# Patient Record
Sex: FEMALE | Race: WHITE | URBAN - METROPOLITAN AREA
[De-identification: names, ages, dates, MRNs, and addresses within clinical notes are randomized per-mention and may not be internally consistent; named-entity substitution may affect disease eponyms.]

---

## 2021-03-18 ENCOUNTER — INPATIENT (INPATIENT)
Facility: HOSPITAL | Age: 56
LOS: 14 days | Discharge: NURSING, REHAB OR LONG-TERM CARE | End: 2021-04-02
Attending: HOSPITALIST | Admitting: HOSPITALIST
Payer: COMMERCIAL

## 2021-03-18 VITALS
DIASTOLIC BLOOD PRESSURE: 50 MMHG | TEMPERATURE: 99 F | SYSTOLIC BLOOD PRESSURE: 100 MMHG | HEART RATE: 101 BPM | WEIGHT: 104.94 LBS | RESPIRATION RATE: 20 BRPM | HEIGHT: 58 IN | OXYGEN SATURATION: 99 %

## 2021-03-18 PROCEDURE — 99285 EMERGENCY DEPT VISIT HI MDM: CPT

## 2021-03-18 PROCEDURE — 99053 MED SERV 10PM-8AM 24 HR FAC: CPT

## 2021-03-18 NOTE — ED ADULT TRIAGE NOTE - CHIEF COMPLAINT QUOTE
BENNETT with complaints of hypotension, sent form nursing home. Pt non-verbal, currently on IV ABT BENNETT with complaints of hypotension, sent form nursing home. Pt non-verbal, currently on IV ABT for decubiti

## 2021-03-19 DIAGNOSIS — Z98.890 OTHER SPECIFIED POSTPROCEDURAL STATES: Chronic | ICD-10-CM

## 2021-03-19 LAB
ALBUMIN SERPL ELPH-MCNC: 2.1 G/DL — LOW (ref 3.5–5.2)
ALBUMIN SERPL ELPH-MCNC: 2.2 G/DL — LOW (ref 3.5–5.2)
ALP SERPL-CCNC: 79 U/L — SIGNIFICANT CHANGE UP (ref 30–115)
ALP SERPL-CCNC: 83 U/L — SIGNIFICANT CHANGE UP (ref 30–115)
ALT FLD-CCNC: 80 U/L — HIGH (ref 0–41)
ALT FLD-CCNC: 96 U/L — HIGH (ref 0–41)
ANION GAP SERPL CALC-SCNC: 7 MMOL/L — SIGNIFICANT CHANGE UP (ref 7–14)
ANION GAP SERPL CALC-SCNC: 9 MMOL/L — SIGNIFICANT CHANGE UP (ref 7–14)
AST SERPL-CCNC: 44 U/L — HIGH (ref 0–41)
AST SERPL-CCNC: 53 U/L — HIGH (ref 0–41)
BASOPHILS # BLD AUTO: 0.05 K/UL — SIGNIFICANT CHANGE UP (ref 0–0.2)
BASOPHILS # BLD AUTO: 0.05 K/UL — SIGNIFICANT CHANGE UP (ref 0–0.2)
BASOPHILS NFR BLD AUTO: 0.6 % — SIGNIFICANT CHANGE UP (ref 0–1)
BASOPHILS NFR BLD AUTO: 0.7 % — SIGNIFICANT CHANGE UP (ref 0–1)
BILIRUB SERPL-MCNC: <0.2 MG/DL — SIGNIFICANT CHANGE UP (ref 0.2–1.2)
BILIRUB SERPL-MCNC: <0.2 MG/DL — SIGNIFICANT CHANGE UP (ref 0.2–1.2)
BLD GP AB SCN SERPL QL: SIGNIFICANT CHANGE UP
BLD GP AB SCN SERPL QL: SIGNIFICANT CHANGE UP
BUN SERPL-MCNC: 15 MG/DL — SIGNIFICANT CHANGE UP (ref 10–20)
BUN SERPL-MCNC: 22 MG/DL — HIGH (ref 10–20)
CALCIUM SERPL-MCNC: 7.9 MG/DL — LOW (ref 8.5–10.1)
CALCIUM SERPL-MCNC: 8.2 MG/DL — LOW (ref 8.5–10.1)
CHLORIDE SERPL-SCNC: 101 MMOL/L — SIGNIFICANT CHANGE UP (ref 98–110)
CHLORIDE SERPL-SCNC: 102 MMOL/L — SIGNIFICANT CHANGE UP (ref 98–110)
CO2 SERPL-SCNC: 30 MMOL/L — SIGNIFICANT CHANGE UP (ref 17–32)
CO2 SERPL-SCNC: 32 MMOL/L — SIGNIFICANT CHANGE UP (ref 17–32)
CREAT SERPL-MCNC: 0.6 MG/DL — LOW (ref 0.7–1.5)
CREAT SERPL-MCNC: 0.7 MG/DL — SIGNIFICANT CHANGE UP (ref 0.7–1.5)
EOSINOPHIL # BLD AUTO: 0.4 K/UL — SIGNIFICANT CHANGE UP (ref 0–0.7)
EOSINOPHIL # BLD AUTO: 0.42 K/UL — SIGNIFICANT CHANGE UP (ref 0–0.7)
EOSINOPHIL NFR BLD AUTO: 4.8 % — SIGNIFICANT CHANGE UP (ref 0–8)
EOSINOPHIL NFR BLD AUTO: 5.8 % — SIGNIFICANT CHANGE UP (ref 0–8)
GLUCOSE SERPL-MCNC: 109 MG/DL — HIGH (ref 70–99)
GLUCOSE SERPL-MCNC: 92 MG/DL — SIGNIFICANT CHANGE UP (ref 70–99)
HCT VFR BLD CALC: 31 % — LOW (ref 37–47)
HCT VFR BLD CALC: 32.6 % — LOW (ref 37–47)
HGB BLD-MCNC: 10.2 G/DL — LOW (ref 12–16)
HGB BLD-MCNC: 9.7 G/DL — LOW (ref 12–16)
IMM GRANULOCYTES NFR BLD AUTO: 0.4 % — HIGH (ref 0.1–0.3)
IMM GRANULOCYTES NFR BLD AUTO: 0.5 % — HIGH (ref 0.1–0.3)
LACTATE SERPL-SCNC: 1.5 MMOL/L — SIGNIFICANT CHANGE UP (ref 0.7–2)
LYMPHOCYTES # BLD AUTO: 1.3 K/UL — SIGNIFICANT CHANGE UP (ref 1.2–3.4)
LYMPHOCYTES # BLD AUTO: 1.43 K/UL — SIGNIFICANT CHANGE UP (ref 1.2–3.4)
LYMPHOCYTES # BLD AUTO: 17.1 % — LOW (ref 20.5–51.1)
LYMPHOCYTES # BLD AUTO: 17.9 % — LOW (ref 20.5–51.1)
MAGNESIUM SERPL-MCNC: 2.2 MG/DL — SIGNIFICANT CHANGE UP (ref 1.8–2.4)
MCHC RBC-ENTMCNC: 27.1 PG — SIGNIFICANT CHANGE UP (ref 27–31)
MCHC RBC-ENTMCNC: 27.3 PG — SIGNIFICANT CHANGE UP (ref 27–31)
MCHC RBC-ENTMCNC: 31.3 G/DL — LOW (ref 32–37)
MCHC RBC-ENTMCNC: 31.3 G/DL — LOW (ref 32–37)
MCV RBC AUTO: 86.6 FL — SIGNIFICANT CHANGE UP (ref 81–99)
MCV RBC AUTO: 87.4 FL — SIGNIFICANT CHANGE UP (ref 81–99)
MONOCYTES # BLD AUTO: 0.43 K/UL — SIGNIFICANT CHANGE UP (ref 0.1–0.6)
MONOCYTES # BLD AUTO: 0.49 K/UL — SIGNIFICANT CHANGE UP (ref 0.1–0.6)
MONOCYTES NFR BLD AUTO: 5.8 % — SIGNIFICANT CHANGE UP (ref 1.7–9.3)
MONOCYTES NFR BLD AUTO: 5.9 % — SIGNIFICANT CHANGE UP (ref 1.7–9.3)
NEUTROPHILS # BLD AUTO: 5.04 K/UL — SIGNIFICANT CHANGE UP (ref 1.4–6.5)
NEUTROPHILS # BLD AUTO: 5.97 K/UL — SIGNIFICANT CHANGE UP (ref 1.4–6.5)
NEUTROPHILS NFR BLD AUTO: 69.3 % — SIGNIFICANT CHANGE UP (ref 42.2–75.2)
NEUTROPHILS NFR BLD AUTO: 71.2 % — SIGNIFICANT CHANGE UP (ref 42.2–75.2)
NRBC # BLD: 0 /100 WBCS — SIGNIFICANT CHANGE UP (ref 0–0)
NRBC # BLD: 0 /100 WBCS — SIGNIFICANT CHANGE UP (ref 0–0)
PLATELET # BLD AUTO: 452 K/UL — HIGH (ref 130–400)
PLATELET # BLD AUTO: 464 K/UL — HIGH (ref 130–400)
POTASSIUM SERPL-MCNC: 4.4 MMOL/L — SIGNIFICANT CHANGE UP (ref 3.5–5)
POTASSIUM SERPL-MCNC: 4.6 MMOL/L — SIGNIFICANT CHANGE UP (ref 3.5–5)
POTASSIUM SERPL-SCNC: 4.4 MMOL/L — SIGNIFICANT CHANGE UP (ref 3.5–5)
POTASSIUM SERPL-SCNC: 4.6 MMOL/L — SIGNIFICANT CHANGE UP (ref 3.5–5)
PROT SERPL-MCNC: 5.1 G/DL — LOW (ref 6–8)
PROT SERPL-MCNC: 5.1 G/DL — LOW (ref 6–8)
RBC # BLD: 3.58 M/UL — LOW (ref 4.2–5.4)
RBC # BLD: 3.73 M/UL — LOW (ref 4.2–5.4)
RBC # FLD: 15 % — HIGH (ref 11.5–14.5)
RBC # FLD: 15.1 % — HIGH (ref 11.5–14.5)
SARS-COV-2 RNA SPEC QL NAA+PROBE: SIGNIFICANT CHANGE UP
SODIUM SERPL-SCNC: 140 MMOL/L — SIGNIFICANT CHANGE UP (ref 135–146)
SODIUM SERPL-SCNC: 141 MMOL/L — SIGNIFICANT CHANGE UP (ref 135–146)
WBC # BLD: 7.27 K/UL — SIGNIFICANT CHANGE UP (ref 4.8–10.8)
WBC # BLD: 8.38 K/UL — SIGNIFICANT CHANGE UP (ref 4.8–10.8)
WBC # FLD AUTO: 7.27 K/UL — SIGNIFICANT CHANGE UP (ref 4.8–10.8)
WBC # FLD AUTO: 8.38 K/UL — SIGNIFICANT CHANGE UP (ref 4.8–10.8)

## 2021-03-19 PROCEDURE — 76705 ECHO EXAM OF ABDOMEN: CPT | Mod: 26

## 2021-03-19 PROCEDURE — 93010 ELECTROCARDIOGRAM REPORT: CPT

## 2021-03-19 PROCEDURE — 99233 SBSQ HOSP IP/OBS HIGH 50: CPT

## 2021-03-19 PROCEDURE — 99252 IP/OBS CONSLTJ NEW/EST SF 35: CPT

## 2021-03-19 PROCEDURE — 71045 X-RAY EXAM CHEST 1 VIEW: CPT | Mod: 26

## 2021-03-19 PROCEDURE — 73620 X-RAY EXAM OF FOOT: CPT | Mod: 26,RT

## 2021-03-19 RX ORDER — COLLAGENASE CLOSTRIDIUM HIST. 250 UNIT/G
1 OINTMENT (GRAM) TOPICAL
Refills: 0 | Status: DISCONTINUED | OUTPATIENT
Start: 2021-03-19 | End: 2021-03-26

## 2021-03-19 RX ORDER — CHOLECALCIFEROL (VITAMIN D3) 125 MCG
2000 CAPSULE ORAL DAILY
Refills: 0 | Status: DISCONTINUED | OUTPATIENT
Start: 2021-03-19 | End: 2021-03-26

## 2021-03-19 RX ORDER — SODIUM HYPOCHLORITE 0.125 %
1 SOLUTION, NON-ORAL MISCELLANEOUS
Refills: 0 | Status: DISCONTINUED | OUTPATIENT
Start: 2021-03-19 | End: 2021-03-26

## 2021-03-19 RX ORDER — ACETAMINOPHEN 500 MG
975 TABLET ORAL ONCE
Refills: 0 | Status: COMPLETED | OUTPATIENT
Start: 2021-03-19 | End: 2021-03-19

## 2021-03-19 RX ORDER — CHLORHEXIDINE GLUCONATE 213 G/1000ML
1 SOLUTION TOPICAL
Refills: 0 | Status: DISCONTINUED | OUTPATIENT
Start: 2021-03-19 | End: 2021-03-26

## 2021-03-19 RX ORDER — SODIUM CHLORIDE 9 MG/ML
1500 INJECTION, SOLUTION INTRAVENOUS ONCE
Refills: 0 | Status: COMPLETED | OUTPATIENT
Start: 2021-03-19 | End: 2021-03-19

## 2021-03-19 RX ORDER — ENOXAPARIN SODIUM 100 MG/ML
40 INJECTION SUBCUTANEOUS DAILY
Refills: 0 | Status: DISCONTINUED | OUTPATIENT
Start: 2021-03-19 | End: 2021-03-26

## 2021-03-19 RX ORDER — METRONIDAZOLE 500 MG
TABLET ORAL
Refills: 0 | Status: DISCONTINUED | OUTPATIENT
Start: 2021-03-19 | End: 2021-03-20

## 2021-03-19 RX ORDER — MEROPENEM 1 G/30ML
500 INJECTION INTRAVENOUS ONCE
Refills: 0 | Status: COMPLETED | OUTPATIENT
Start: 2021-03-19 | End: 2021-03-19

## 2021-03-19 RX ORDER — LANOLIN ALCOHOL/MO/W.PET/CERES
5 CREAM (GRAM) TOPICAL AT BEDTIME
Refills: 0 | Status: DISCONTINUED | OUTPATIENT
Start: 2021-03-19 | End: 2021-03-26

## 2021-03-19 RX ORDER — METRONIDAZOLE 500 MG
500 TABLET ORAL ONCE
Refills: 0 | Status: COMPLETED | OUTPATIENT
Start: 2021-03-19 | End: 2021-03-19

## 2021-03-19 RX ORDER — RALOXIFENE HYDROCHLORIDE 60 MG/1
60 TABLET, COATED ORAL DAILY
Refills: 0 | Status: DISCONTINUED | OUTPATIENT
Start: 2021-03-19 | End: 2021-03-26

## 2021-03-19 RX ORDER — GABAPENTIN 400 MG/1
600 CAPSULE ORAL DAILY
Refills: 0 | Status: DISCONTINUED | OUTPATIENT
Start: 2021-03-19 | End: 2021-03-26

## 2021-03-19 RX ORDER — VANCOMYCIN HCL 1 G
1000 VIAL (EA) INTRAVENOUS EVERY 12 HOURS
Refills: 0 | Status: DISCONTINUED | OUTPATIENT
Start: 2021-03-19 | End: 2021-03-20

## 2021-03-19 RX ORDER — METRONIDAZOLE 500 MG
500 TABLET ORAL EVERY 8 HOURS
Refills: 0 | Status: DISCONTINUED | OUTPATIENT
Start: 2021-03-19 | End: 2021-03-20

## 2021-03-19 RX ORDER — SODIUM CHLORIDE 9 MG/ML
1000 INJECTION, SOLUTION INTRAVENOUS ONCE
Refills: 0 | Status: COMPLETED | OUTPATIENT
Start: 2021-03-19 | End: 2021-03-19

## 2021-03-19 RX ORDER — MEROPENEM 1 G/30ML
1000 INJECTION INTRAVENOUS EVERY 24 HOURS
Refills: 0 | Status: COMPLETED | OUTPATIENT
Start: 2021-03-19 | End: 2021-03-25

## 2021-03-19 RX ADMIN — MEROPENEM 100 MILLIGRAM(S): 1 INJECTION INTRAVENOUS at 02:29

## 2021-03-19 RX ADMIN — Medication 250 MILLIGRAM(S): at 17:05

## 2021-03-19 RX ADMIN — Medication 100 MILLIGRAM(S): at 12:28

## 2021-03-19 RX ADMIN — RALOXIFENE HYDROCHLORIDE 60 MILLIGRAM(S): 60 TABLET, COATED ORAL at 12:30

## 2021-03-19 RX ADMIN — ENOXAPARIN SODIUM 40 MILLIGRAM(S): 100 INJECTION SUBCUTANEOUS at 12:29

## 2021-03-19 RX ADMIN — SODIUM CHLORIDE 1500 MILLILITER(S): 9 INJECTION, SOLUTION INTRAVENOUS at 02:26

## 2021-03-19 RX ADMIN — Medication 5 MILLIGRAM(S): at 23:55

## 2021-03-19 RX ADMIN — Medication 2000 UNIT(S): at 12:28

## 2021-03-19 RX ADMIN — MEROPENEM 100 MILLIGRAM(S): 1 INJECTION INTRAVENOUS at 16:01

## 2021-03-19 RX ADMIN — Medication 975 MILLIGRAM(S): at 02:31

## 2021-03-19 RX ADMIN — SODIUM CHLORIDE 3000 MILLILITER(S): 9 INJECTION, SOLUTION INTRAVENOUS at 15:19

## 2021-03-19 RX ADMIN — Medication 100 MILLIGRAM(S): at 23:55

## 2021-03-19 RX ADMIN — GABAPENTIN 600 MILLIGRAM(S): 400 CAPSULE ORAL at 12:29

## 2021-03-19 NOTE — CONSULT NOTE ADULT - ASSESSMENT
54 yo female hx of Down syndrome, osteoporosis, neuropathy, mild anemia, and recent decubitus ulcer to right hip s/p debridement on 3/2 at NH sent from [facility] for hypotension (66/44) and fever (unknown temperature). As per NH paper, patient was taking Vanco and danna via PICC supposed to finish Vanc and Danna on 3/22.   BURN consult requested for evaluation lof R hip and left foot wounds.       1. Right hip full thickness pressure wound  - please continue dressing twice daily: was wound with soap and water, apply santyl and hydrogel, pack with Dakins moist gauze, then cover with ABD and tape  - IV abx as per ID recommendations  - pain management  - off loading/position change  - may need debridement  - will fallow    2. Left heel partial thickness wound:  - not infected, no debridement needed  - continue wound care twice daily: wash wound with soap and water, apply xeroform, and wrap with kelrix     54 yo female hx of Down syndrome, osteoporosis, neuropathy, mild anemia, and recent decubitus ulcer to right hip s/p debridement on 3/2 at NH sent from [facility] for hypotension (66/44) and fever (unknown temperature). As per NH paper, patient was taking Vanco and danna via PICC supposed to finish Vanc and Danna on 3/22.   BURN consult requested for evaluation lof R hip and left foot wounds.       Rec:   1. Right hip full thickness pressure wound- Stage 3   - please continue dressing twice daily: was wound with soap and water, apply santyl and hydrogel, pack with Dakins moist gauze, then cover with ABD and tape  - IV abx as per ID recommendations  - pain management  - off loading/position change  - will discuss  debridement  - will fallow    2. Left heel partial thickness wound- Stage 2  - not infected, no debridement needed  - continue wound care twice daily: wash wound with soap and water, apply xeroform, and wrap with kelrix

## 2021-03-19 NOTE — SWALLOW BEDSIDE ASSESSMENT ADULT - SLP PERTINENT HISTORY OF CURRENT PROBLEM
4 yo female hx of Down syndrome, osteoporosis, neuropathy, mild anemia, and recent decubitus ulcer to right hip s/p debridement on 3/2 at NH sent from [facility] for hypotension (66/44) and fever. Patient baseline non-verbal and unable to give history. CXR 3/19/21 (right opacity, left retrocardiac opacity). 54 y/o F presented from [facility] for hypotension and fevers. CXR 3/19/21 (right opacity, left retrocardiac opacity). Down syndrome, osteoporosis, neuropathy, mild anemia, and recent decubitus ulcer to right hip s/p debridement on 3/2 at NH.

## 2021-03-19 NOTE — ED ADULT NURSE NOTE - NSIMPLEMENTINTERV_GEN_ALL_ED
Implemented All Universal Safety Interventions:  [place] to call system. Call bell, personal items and telephone within reach. Instruct patient to call for assistance. Room bathroom lighting operational. Non-slip footwear when patient is off stretcher. Physically safe environment: no spills, clutter or unnecessary equipment. Stretcher in lowest position, wheels locked, appropriate side rails in place. Implemented All Fall Risk Interventions:  [place] to call system. Call bell, personal items and telephone within reach. Instruct patient to call for assistance. Room bathroom lighting operational. Non-slip footwear when patient is off stretcher. Physically safe environment: no spills, clutter or unnecessary equipment. Stretcher in lowest position, wheels locked, appropriate side rails in place. Provide visual cue, wrist band, yellow gown, etc. Monitor gait and stability. Monitor for mental status changes and reorient to person, place, and time. Review medications for side effects contributing to fall risk. Reinforce activity limits and safety measures with patient and family.

## 2021-03-19 NOTE — ED PROVIDER NOTE - OBJECTIVE STATEMENT
54 yo female hx of Down syndrome/ recent decubitus ulcer to right hip s/p debridement on 3/2 from an outside hospital sent from NH for hypotension (66/44) and fever. as per NH paper, patient was taking Vanco for decubitus ulcer and recently developed more wounds to right foot and left foot/heel. patient baseline non-verbal and unable to give history.

## 2021-03-19 NOTE — H&P ADULT - NSHPPOADEEPVENOUSTHROMB_GEN_A_CORE
Called patient, he states the hyddroxyzine 25mg makes him extremely tired to were he can not function, she states he just wants to sleep and he is completely zoned out.  He is asking for a different dose or a different medication.  I let him know I will talk to Dr. Nguyen and call him back.  Per Dr. Nguyen; patient can try alprazolam 0.25mg TID PRN.   Notified patient and called in prescription to [facility] in [place].      no

## 2021-03-19 NOTE — SWALLOW BEDSIDE ASSESSMENT ADULT - COMMENTS
Pt received awake on NC LPM. +Nonverbal, +clear voice, +baseline cough of mild congestion and dry quality Desaturation to 79% SPO2. Baseline cough present throughout trials. Spoke w/ RN at [facility] ([place]), who reported that patient's baseline diet is puree w/ nectar thick liquids.

## 2021-03-19 NOTE — SWALLOW BEDSIDE ASSESSMENT ADULT - NS SPL SWALLOW CLINIC TRIAL FT
+toleration without overt s/s of penetration/aspiration. Spontaneous swallow post oral intake. Baseline cough present throughout trials. +toleration without overt s/s of penetration/aspiration. Spontaneous swallow post oral intake. Baseline cough present throughout trials, unchanged.

## 2021-03-19 NOTE — ED ADULT NURSE NOTE - CHIEF COMPLAINT QUOTE
BENNETT with complaints of hypotension, sent form nursing home. Pt non-verbal, currently on IV ABT for decubiti

## 2021-03-19 NOTE — CONSULT NOTE ADULT - ASSESSMENT
ASSESSMENT  56 yo female hx of Down syndrome, osteoporosis, neuropathy, mild anemia, and recent decubitus ulcer to right hip s/p debridement on 3/2 at NH sent from [facility] for hypotension (66/44) and fever (unknown temperature)    IMPRESSION  #Fevers/Hypotension  - s/p right hip debridement 3/2  - planned for Vancomycin + Meropenem via PICC line    #Decubitus ulcer  #Down Syndrome  #Abx allergy: NKDA    Creatinine, Serum: 0.7 mg/dL (03.19.21 @ 02:30)  Weight (kg): 47.6 (18 Mar 2021 23:50)    RECOMMENDATIONS  - continue vancomycin 1g q 12 hours -- please check trough prior to next dose, goal 10-15  - continue meropenem 1g q 8 hours  - follow-up fungitell  - will follow burn for debridement  - please check Nursing home for culture history from debridement 3/2  - follow-up blood cultures  - if with clinical decompensation, can add caspofungin 70 mg x 1    Please call or message on Microsoft Teams if with any questions.  Spectra [phone]

## 2021-03-19 NOTE — H&P ADULT - NSHPLABSRESULTS_GEN_ALL_CORE
LABS/RADIOLOGY RESULTS:                          10.2   7.27  )-----------( 452      ( 19 Mar 2021 02:30 )             32.6   03-19    141  |  102  |  22<H>  ----------------------------<  109<H>  4.6   |  30  |  0.7    Ca    7.9<L>      19 Mar 2021 02:30    TPro  5.1<L>  /  Alb  2.2<L>  /  TBili  <0.2  /  DBili  x   /  AST  53<H>  /  ALT  96<H>  /  AlkPhos  83  03-19  Blood Cultures

## 2021-03-19 NOTE — CONSULT NOTE ADULT - SUBJECTIVE AND OBJECTIVE BOX
TEA ECHAVARRIA  55y, Female  Allergy: No Known Allergies      CHIEF COMPLAINT: Hypotension and fever (19 Mar 2021 20:29)      LOS      HPI:  54 yo female hx of Down syndrome, osteoporosis, neuropathy, mild anemia, and recent decubitus ulcer to right hip s/p debridement on 3/2 at NH sent from [facility] for hypotension (66/44) and fever (unknown temperature). As per NH paper, patient was taking Vanco and danan for decubitus ulcer and recently developed more wounds to right foot and left foot/heel. Patient baseline non-verbal and unable to give history. Called NH and was told that patient had a PICC on R and was supposed to finish Vanc and Danna on 3/22. Last Vanc trough was 16.7 on 3/17.      In the ED T 98.6  /50 RR 20 O2% 99 on 3L NC. 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 Labs were significant for WBC 7.27, Hb 10.2 AST/ALT 53/96. EKG normal. Covid negative. Chest xray shows R base opacity concerning for infection. R foot xray shows fracture of the tuft of the R distal phalanx, diffuse swelling along the dorsum of the foot. Vanc and danna was given. (19 Mar 2021 09:40)      INFECTIOUS DISEASE HISTORY:  History as above.   PICC line removed -- noted to have pus when removing PICC line     PAST MEDICAL & SURGICAL HISTORY:  Neuropathy    Mild anemia    Osteoporosis    Down syndrome    S/P debridement  of R hip on 3/2/21        FAMILY HISTORY  No pertinent family history in first degree relatives        SOCIAL HISTORY  Social History:  Unable to assess (19 Mar 2021 09:40)        ROS  unable to obtain history secondary to patient's mental status     VITALS:  T(F): 97.7, Max: 99.7 (03-19-21 @ 01:23)  HR: 79  BP: 148/68  RR: 18Vital Signs Last 24 Hrs  T(C): 36.5 (19 Mar 2021 20:27), Max: 37.6 (19 Mar 2021 01:23)  T(F): 97.7 (19 Mar 2021 20:27), Max: 99.7 (19 Mar 2021 01:23)  HR: 79 (19 Mar 2021 20:27) (76 - 101)  BP: 148/68 (19 Mar 2021 20:27) (78/44 - 148/68)  BP(mean): 67 (19 Mar 2021 16:30) (67 - 67)  RR: 18 (19 Mar 2021 20:27) (16 - 20)  SpO2: 98% (19 Mar 2021 20:28) (97% - 99%)    PHYSICAL EXAM:  Gen:chronically ill appearing   HEENT: Normocephalic, atraumatic  Neck: supple, no lymphadenopathy  CV: Regular rate & regular rhythm  Lungs: decreased BS at bases, no fremitus  Abdomen: Soft, BS present  Ext: Warm, well perfused  Neuro: non focal, awake  Skin: left heel with open ulcer -- Sacral Ulcer with necrotic, black eschar   Lines: no phlebitis    TESTS & MEASUREMENTS:                        9.7    8.38  )-----------( 464      ( 19 Mar 2021 11:24 )             31.0     03-19    140  |  101  |  15  ----------------------------<  92  4.4   |  32  |  0.6<L>    Ca    8.2<L>      19 Mar 2021 11:24  Mg     2.2     03-19    TPro  5.1<L>  /  Alb  2.1<L>  /  TBili  <0.2  /  DBili  x   /  AST  44<H>  /  ALT  80<H>  /  AlkPhos  79  03-19    eGFR if Non African American: 103 mL/min/1.73M2 (03-19-21 @ 11:24)  eGFR if : 120 mL/min/1.73M2 (03-19-21 @ 11:24)  eGFR if Non African American: 98 mL/min/1.73M2 (03-19-21 @ 02:30)  eGFR if African American: 114 mL/min/1.73M2 (03-19-21 @ 02:30)    LIVER FUNCTIONS - ( 19 Mar 2021 11:24 )  Alb: 2.1 g/dL / Pro: 5.1 g/dL / ALK PHOS: 79 U/L / ALT: 80 U/L / AST: 44 U/L / GGT: x                 Lactate, Blood: 1.5 mmol/L (03-19-21 @ 02:30)      INFECTIOUS DISEASES TESTING  COVID-19 PCR: NotDetec (03-19-21 @ 02:30)      RADIOLOGY & ADDITIONAL TESTS:  I have personally reviewed the last Chest xray  CXR  Xray Chest 1 View- PORTABLE-Urgent:   EXAM:  XR CHEST PORTABLE URGENT 1V            PROCEDURE DATE:  03/19/2021            INTERPRETATION:  Clinical History / Reason for exam: Foot wound    Comparison : Chest radiograph None.    Technique/Positioning: Frontal, portable.    Findings:    Support devices: None.    Cardiac/mediastinum/hilum: Unremarkable.    Lung parenchyma/Pleura: There is a right base opacity and a left retrocardiac opacity    Skeleton/soft tissues: Degenerative change    Impression:    Right base opacity and a left retrocardiac opacity. Findings may be infectious in nature. Clinical correlation is recommended. Follow to resolution is necessary to exclude underlying process              TOÑITO MORENO MD; Attending Radiologist  This document has been electronically signed. Mar 19 2021  6:00AM (03-19-21 @ 01:53)      CT      CARDIOLOGY TESTING  12 Lead ECG:   Ventricular Rate 94 BPM    Atrial Rate 94 BPM    P-R Interval 140 ms    QRS Duration 60 ms    Q-T Interval 348 ms    QTC Calculation(Bazett) 435 ms    P Axis 51 degrees    R Axis 46 degrees    T Axis 54 degrees    Diagnosis Line Normal sinus rhythm  Normal ECG    Confirmed by ROSA RENEE MD ([id number]) on 3/19/2021 6:38:52 AM (03-19-21 @ 02:22)      MEDICATIONS  chlorhexidine 4% Liquid 1 Topical <User Schedule>  cholecalciferol 2000 Oral daily  collagenase Ointment 1 Topical two times a day  Dakins Solution - 1/2 Strength 1 Topical two times a day  enoxaparin Injectable 40 SubCutaneous daily  gabapentin 600 Oral daily  melatonin 5 Oral at bedtime  meropenem  IVPB 1000 IV Intermittent every 24 hours  metroNIDAZOLE  IVPB     metroNIDAZOLE  IVPB 500 IV Intermittent every 8 hours  raloxifene 60 Oral daily  vancomycin  IVPB 1000 IV Intermittent every 12 hours      Weight  Weight (kg): 47.6 (03-18-21 @ 23:50)    ANTIBIOTICS:  meropenem  IVPB 1000 milliGRAM(s) IV Intermittent every 24 hours  metroNIDAZOLE  IVPB      metroNIDAZOLE  IVPB 500 milliGRAM(s) IV Intermittent every 8 hours  vancomycin  IVPB 1000 milliGRAM(s) IV Intermittent every 12 hours      ALLERGIES:  No Known Allergies

## 2021-03-19 NOTE — H&P ADULT - NSHPPHYSICALEXAM_GEN_ALL_CORE
GENERAL: NAD, lying in bed comfortably  HEAD:  Atraumatic, Normocephalic  CHEST/LUNG: Clear to auscultation bilaterally; No rales, rhonchi, wheezing, or rubs. Unlabored respirations  HEART: Regular rate and rhythm; No murmurs, rubs, or gallops  ABDOMEN: Bowel sounds present; Soft, Nontender, Nondistended. No hepatomegally  EXTREMITIES:   No clubbing or cyanosis. 1+ pedal edema bilaterally  NERVOUS SYSTEM:  Non verbal  MSK: Unable to assess  SKIN: Gauze on R hip, Ulcers on bilateral feet

## 2021-03-19 NOTE — ED PROVIDER NOTE - PROGRESS NOTE DETAILS
Pt s/o to me by Dr. Amaya to follow up labs, reassess and dispo. Pt s/o to Dr. Amaya to follow up labs, reassess and dispo.

## 2021-03-19 NOTE — ED PROVIDER NOTE - ATTENDING CONTRIBUTION TO CARE
56 yo female with PMH Down's syndrome sent from NH for low BP and sespis due to multiple infected decubiti. Pt on merrem and vancomycin, however new wounds developed. Pt without any vomiting, difficulty breathing. Pt unable to give any history.    VS noted, agree with exam as above.  A/P: Decubitus ulcers; Cellulitis- IVF continue broad spectrum abx, admit for continued treatment

## 2021-03-19 NOTE — PATIENT PROFILE ADULT - VISION (WITH CORRECTIVE LENSES IF THE PATIENT USUALLY WEARS THEM):
unable to respond, non verbal unable to respond, non verbal/Normal vision: sees adequately in most situations; can see medication labels, newsprint

## 2021-03-19 NOTE — H&P ADULT - HISTORY OF PRESENT ILLNESS
54 yo female hx of Down syndrome/ recent decubitus ulcer to right hip s/p debridement on 3/2 from an outside hospital sent from NH for hypotension (66/44) and fever. As per NH paper, patient was taking Vanco for decubitus ulcer and recently developed more wounds to right foot and left foot/heel. Patient baseline non-verbal and unable to give history.    In the ED T 98.6  /50 RR 20 O2% 99 on 3L NC. Was found to have 4 ulcer: Stage IV on R hip (6x6x2), DTI on R foot (5x5x0), and two DTI on L foot (4x4x0 & 4x5x0). EKG normal. Vanc and danna was given. 54 yo female hx of Down syndrome, osteoporosis, neuropathy, mild anemia, and recent decubitus ulcer to right hip s/p debridement on 3/2 at NH sent from [facility] for hypotension (66/44) and fever (unknown temperature). As per NH paper, patient was taking Vanco and danna for decubitus ulcer and recently developed more wounds to right foot and left foot/heel. Patient baseline non-verbal and unable to give history. Called NH and was told that patient had a PICC on R and was supposed to finish Vanc and Danna on 3/22. Last Vanc trough was 16.7 on 3/17.      In the ED T 98.6  /50 RR 20 O2% 99 on 3L NC. 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 Labs were significant for WBC 7.27, Hb 10.2 AST/ALT 53/96. EKG normal. Covid negative. Chest xray shows R base opacity concerning for infection. R foot xray shows fracture of the tuft of the R distal phalanx, diffuse swelling along the dorsum of the foot. Vanc and danna was given.

## 2021-03-19 NOTE — SWALLOW BEDSIDE ASSESSMENT ADULT - SLP GENERAL OBSERVATIONS
Pt received awake on NC LPM. +Nonverbal, +clear voice, +baseline cough of mild congestion and dry quality Pt received awake on 3L o2 NC, +Nonverbal, +clear vocal quality during spontaneous vocalizations, +baseline upper airway cough.

## 2021-03-19 NOTE — ED PROVIDER NOTE - PHYSICAL EXAMINATION
CONSTITUTIONAL: + chronic ill female; in no apparent distress.   CARDIOVASCULAR: Normal S1, S2; no murmurs, rubs, or gallops.   RESPIRATORY: Normal chest excursion with respiration; breath sounds clear and equal bilaterally; no wheezes, rhonchi, or rales.  GI/: Normal bowel sounds; non-distended; non-tender; no palpable organomegaly.   MS: contracted b/l hips and knee. + pitting edema to left foot. no crepitus.   SKIN: 6 cm stage 4 decubitus ulcer with discharge and surrounding erythema and necrotic/eschar tissue within wound to right hip. deep tissue injury (+ softness and fluid filled blister, about 4-5 cm) with intact skin to the sole of right fore foot. + 2 more blister to lateral/medial left heel and left forefoot (toes).   NEURO/PSYCH: A and non-verbal; move upper extremities. CONSTITUTIONAL: + chronic ill female; in no apparent distress.   CARDIOVASCULAR: RRR + 3/6 holosystolic murmur throughout chest.   RESPIRATORY: Normal chest excursion with respiration; breath sounds clear and equal bilaterally; no wheezes, rhonchi, or rales.  GI/: Normal bowel sounds; non-distended; non-tender; no palpable organomegaly.   MS: contracted b/l hips and knee. + pitting edema to left foot. no crepitus.   SKIN: 6 cm stage 4 decubitus ulcer with discharge and surrounding erythema and necrotic/eschar tissue within wound to right hip. deep tissue injury (+ softness and fluid filled blister, about 4-5 cm) with intact skin to the sole of right fore foot. + 2 more blister to lateral/medial left heel and left forefoot (toes).   NEURO/PSYCH: A and non-verbal; move upper extremities.

## 2021-03-19 NOTE — CONSULT NOTE ADULT - SUBJECTIVE AND OBJECTIVE BOX
55y  Female  HPI:  56 yo female hx of Down syndrome, osteoporosis, neuropathy, mild anemia, and recent decubitus ulcer to right hip s/p debridement on 3/2 at NH sent from [facility] for hypotension (66/44) and fever (unknown temperature). As per NH paper, patient was taking Vanco and danna for decubitus ulcer and recently developed more wounds to right foot and left foot/heel. Patient baseline non-verbal and unable to give history. Called NH and was told that patient had a PICC on R and was supposed to finish Vanc and Danna on 3/22. Last Vanc trough was 16.7 on 3/17.      BURN consult requested for evaluation lof R hip and left foot wounds.     Allergies  No Known Allergies      PAST MEDICAL & SURGICAL HISTORY:  Neuropathy  Mild anemia  Osteoporosis  Down syndrome  S/P debridementof R hip on 3/2/21    Vital Signs Last 24 Hrs  T(C): 36.5 (19 Mar 2021 20:27), Max: 37.6 (19 Mar 2021 01:23)  T(F): 97.7 (19 Mar 2021 20:27), Max: 99.7 (19 Mar 2021 01:23)  HR: 79 (19 Mar 2021 20:27) (76 - 101)  BP: 148/68 (19 Mar 2021 20:27) (78/44 - 148/68)  BP(mean): 67 (19 Mar 2021 16:30) (67 - 67)  RR: 18 (19 Mar 2021 20:27) (16 - 20)  SpO2: 98% (19 Mar 2021 20:28) (97% - 99%)    LABS:  CBC Full  -  ( 19 Mar 2021 11:24 )  WBC Count : 8.38 K/uL  RBC Count : 3.58 M/uL  Hemoglobin : 9.7 g/dL  Hematocrit : 31.0 %  Platelet Count - Automated : 464 K/uL  Mean Cell Volume : 86.6 fL  Mean Cell Hemoglobin : 27.1 pg  Mean Cell Hemoglobin Concentration : 31.3 g/dL  Auto Neutrophil # : 5.97 K/uL  Auto Lymphocyte # : 1.43 K/uL  Auto Monocyte # : 0.49 K/uL  Auto Eosinophil # : 0.40 K/uL  Auto Neutrophil % : 71.2 %  Auto Lymphocyte % : 17.1 %  Auto Monocyte % : 5.8 %  Auto Eosinophil % : 4.8 %  Auto Basophil % : 0.6 %        PHYSICAL EXAM:  GENERAL: pt seen in ED, awake, non verbal, not in acute distress  Wounds:  Right hip full thickness wound about 5cm x 7cm and 2cm deep, with area of yellow devitalized and black necrotic tissue, serosanguineous discharge, no acute bleeding, no pus draining.   Left inner heel with 4cm x4cm partial thickness ulcer, pink/red, no edema, no erythema, no bleeding, no infection.      55y  Female  HPI:  54 yo female hx of Down syndrome, osteoporosis, neuropathy, mild anemia, and recent decubitus ulcer to right hip s/p debridement on 3/2 at NH sent from [facility] for hypotension (66/44) and fever (unknown temperature). As per NH paper, patient was taking Vanco and danna for decubitus ulcer and recently developed more wounds to right foot and left foot/heel. Patient baseline non-verbal and unable to give history. Called NH and was told that patient had a PICC on R and was supposed to finish Vanc and Danna on 3/22. Last Vanc trough was 16.7 on 3/17.      BURN consult requested for evaluation of R hip and left foot wounds.     Allergies  No Known Allergies      PAST MEDICAL & SURGICAL HISTORY:  Neuropathy  Mild anemia  Osteoporosis  Down syndrome  S/P debridementof R hip on 3/2/21    Vital Signs Last 24 Hrs  T(C): 36.5 (19 Mar 2021 20:27), Max: 37.6 (19 Mar 2021 01:23)  T(F): 97.7 (19 Mar 2021 20:27), Max: 99.7 (19 Mar 2021 01:23)  HR: 79 (19 Mar 2021 20:27) (76 - 101)  BP: 148/68 (19 Mar 2021 20:27) (78/44 - 148/68)  BP(mean): 67 (19 Mar 2021 16:30) (67 - 67)  RR: 18 (19 Mar 2021 20:27) (16 - 20)  SpO2: 98% (19 Mar 2021 20:28) (97% - 99%)    LABS:  CBC Full  -  ( 19 Mar 2021 11:24 )  WBC Count : 8.38 K/uL  RBC Count : 3.58 M/uL  Hemoglobin : 9.7 g/dL  Hematocrit : 31.0 %  Platelet Count - Automated : 464 K/uL    PHYSICAL EXAM:  GENERAL: pt seen in ED, awake, non verbal, not in acute distress  Wounds:  Right hip full thickness wound about 5cm x 7cm and 2cm deep, with  lateral pink granulation and medial devitalized and black necrotic tissue, serosanguineous discharge, no acute bleeding, no pus draining.   Left medial  heel with 4cm x4cm ruptured blister revealing pink ulcer ; devitalized skin   pink/red, no edema, no erythema, no bleeding, no infection.   small wounds - dessicated  ulcers bilateral great toes       dressings changed

## 2021-03-19 NOTE — H&P ADULT - ASSESSMENT
54 yo female hx of Down syndrome, osteoporosis, neuropathy, mild anemia, and recent decubitus ulcer to right hip s/p debridement on 3/2 at NH sent from [facility] for hypotension (66/44) and fever.    # Sepsis  # Multiple ulcers with R hip   Source: PNA vs ulcer vs UTI vs PICC line  BP at home 66/44, febrile at home, HR in  positive for SIRS  Chest xray shows R base opacity concerning for infection  On danna and vanc from 3/15 to be completed 3/22  Last vanc trough 16.7 on 3/17  WBC 7.27  Eschar stage IV on R hip  COVID negative  Saturating well on 3L NC  - continue with home vanc and danna for now  - Follow up vanc trough  - Add flagyl  - Burn consulted for possible debridement  - ID consulted  - Follow up esr, crp, procal  - Follow up UA and Bcx  - wean O2 as tolerated    # Osteoporosis  - continue with raloxifene  - continue with Vit D    # Neuropathy  - continue with gabapentin    # Mild anemia  Hb 10.2  As per NH Hb 10.7 on 3/15  - monitor CBC  - Keep active T&S  - Follow up iron studies     # Transaminitis  AST/ALT 53/96  - Follow up RUQ US    Diet: Dysphagia 1 until S&S  Activity: AAT  DVT Prophylaxis: Lovenox  GI Prophylaxis: Not indicated  CHG Order Yes  Code Status: Full  Disposition: From [facility]

## 2021-03-19 NOTE — ED ADULT NURSE REASSESSMENT NOTE - NS ED NURSE REASSESS COMMENT FT1
Pt cleaned and repositioned. Pressure ulcers cleaned and new dressing applied. Pt admitted to medicine, awaiting for clean bed upstairs. Nursing care continues.

## 2021-03-19 NOTE — CHART NOTE - NSCHARTNOTEFT_GEN_A_CORE
Patient examined, appears comfortable. BP on the low 90s and 80s systolic. 500cc of NS bolus given and then 1L of LR bolus given. PICC line removed, some pus seen along with the removal of the PICC line.    Plan:  - PICC line removed  - Will continue with antibiotic coverage  - Likely needs debridement for source control  - Will order Fungitell and cultures for AM  - Will sign out for any signs of hemodynamic compromise

## 2021-03-19 NOTE — ED ADULT NURSE NOTE - PAIN RATING/NUMBER SCALE (0-10): ACTIVITY
0 Interpolation Flap Text: A decision was made to reconstruct the defect utilizing an interpolation axial flap and a staged reconstruction.  A telfa template was made of the defect.  This telfa template was then used to outline the interpolation flap.  The donor area for the pedicle flap was then injected with anesthesia.  The flap was excised through the skin and subcutaneous tissue down to the layer of the underlying musculature.  The interpolation flap was carefully excised within this deep plane to maintain its blood supply.  The edges of the donor site were undermined.   The donor site was closed in a primary fashion.  The pedicle was then rotated into position and sutured.  Once the tube was sutured into place, adequate blood supply was confirmed with blanching and refill.  The pedicle was then wrapped with xeroform gauze and dressed appropriately with a telfa and gauze bandage to ensure continued blood supply and protect the attached pedicle.

## 2021-03-19 NOTE — SWALLOW BEDSIDE ASSESSMENT ADULT - SWALLOW EVAL: DIAGNOSIS
+toleration of puree and nectar-thick liquids without overt s/s of penetration/aspiration. +baseline cough of unchanged quality throughout trials. Desaturation to 79% SPO2 with honey-thick liquids. +toleration of puree, honey and nectar-thick liquids without overt s/s of penetration/aspiration. +baseline cough, unchanged quality throughout trials. Gagging noted post po trials across all consistencies, possibly behavioral.

## 2021-03-19 NOTE — ED ADULT NURSE NOTE - HIV OFFER
Previously Declined (within the last year) Unable to answer due to medical condition/unresponsive/etc...

## 2021-03-20 LAB
ALBUMIN SERPL ELPH-MCNC: 2.1 G/DL — LOW (ref 3.5–5.2)
ALP SERPL-CCNC: 71 U/L — SIGNIFICANT CHANGE UP (ref 30–115)
ALT FLD-CCNC: 59 U/L — HIGH (ref 0–41)
ANION GAP SERPL CALC-SCNC: 6 MMOL/L — LOW (ref 7–14)
AST SERPL-CCNC: 30 U/L — SIGNIFICANT CHANGE UP (ref 0–41)
BASOPHILS # BLD AUTO: 0.06 K/UL — SIGNIFICANT CHANGE UP (ref 0–0.2)
BASOPHILS NFR BLD AUTO: 1 % — SIGNIFICANT CHANGE UP (ref 0–1)
BILIRUB SERPL-MCNC: <0.2 MG/DL — SIGNIFICANT CHANGE UP (ref 0.2–1.2)
BUN SERPL-MCNC: 10 MG/DL — SIGNIFICANT CHANGE UP (ref 10–20)
CALCIUM SERPL-MCNC: 7.9 MG/DL — LOW (ref 8.5–10.1)
CHLORIDE SERPL-SCNC: 98 MMOL/L — SIGNIFICANT CHANGE UP (ref 98–110)
CO2 SERPL-SCNC: 32 MMOL/L — SIGNIFICANT CHANGE UP (ref 17–32)
CREAT SERPL-MCNC: 0.5 MG/DL — LOW (ref 0.7–1.5)
EOSINOPHIL # BLD AUTO: 0.37 K/UL — SIGNIFICANT CHANGE UP (ref 0–0.7)
EOSINOPHIL NFR BLD AUTO: 5.9 % — SIGNIFICANT CHANGE UP (ref 0–8)
GLUCOSE SERPL-MCNC: 98 MG/DL — SIGNIFICANT CHANGE UP (ref 70–99)
HCT VFR BLD CALC: 29.5 % — LOW (ref 37–47)
HGB BLD-MCNC: 9.3 G/DL — LOW (ref 12–16)
IMM GRANULOCYTES NFR BLD AUTO: 0.5 % — HIGH (ref 0.1–0.3)
LYMPHOCYTES # BLD AUTO: 1.39 K/UL — SIGNIFICANT CHANGE UP (ref 1.2–3.4)
LYMPHOCYTES # BLD AUTO: 22.2 % — SIGNIFICANT CHANGE UP (ref 20.5–51.1)
MAGNESIUM SERPL-MCNC: 2 MG/DL — SIGNIFICANT CHANGE UP (ref 1.8–2.4)
MCHC RBC-ENTMCNC: 26.9 PG — LOW (ref 27–31)
MCHC RBC-ENTMCNC: 31.5 G/DL — LOW (ref 32–37)
MCV RBC AUTO: 85.3 FL — SIGNIFICANT CHANGE UP (ref 81–99)
MONOCYTES # BLD AUTO: 0.4 K/UL — SIGNIFICANT CHANGE UP (ref 0.1–0.6)
MONOCYTES NFR BLD AUTO: 6.4 % — SIGNIFICANT CHANGE UP (ref 1.7–9.3)
NEUTROPHILS # BLD AUTO: 4.02 K/UL — SIGNIFICANT CHANGE UP (ref 1.4–6.5)
NEUTROPHILS NFR BLD AUTO: 64 % — SIGNIFICANT CHANGE UP (ref 42.2–75.2)
NRBC # BLD: 0 /100 WBCS — SIGNIFICANT CHANGE UP (ref 0–0)
PLATELET # BLD AUTO: 483 K/UL — HIGH (ref 130–400)
POTASSIUM SERPL-MCNC: 3.9 MMOL/L — SIGNIFICANT CHANGE UP (ref 3.5–5)
POTASSIUM SERPL-SCNC: 3.9 MMOL/L — SIGNIFICANT CHANGE UP (ref 3.5–5)
PROT SERPL-MCNC: 4.9 G/DL — LOW (ref 6–8)
RBC # BLD: 3.46 M/UL — LOW (ref 4.2–5.4)
RBC # FLD: 15.1 % — HIGH (ref 11.5–14.5)
SODIUM SERPL-SCNC: 136 MMOL/L — SIGNIFICANT CHANGE UP (ref 135–146)
VANCOMYCIN TROUGH SERPL-MCNC: 7.3 UG/ML — SIGNIFICANT CHANGE UP (ref 5–10)
WBC # BLD: 6.27 K/UL — SIGNIFICANT CHANGE UP (ref 4.8–10.8)
WBC # FLD AUTO: 6.27 K/UL — SIGNIFICANT CHANGE UP (ref 4.8–10.8)

## 2021-03-20 PROCEDURE — 99232 SBSQ HOSP IP/OBS MODERATE 35: CPT

## 2021-03-20 RX ORDER — SODIUM CHLORIDE 9 MG/ML
500 INJECTION INTRAMUSCULAR; INTRAVENOUS; SUBCUTANEOUS ONCE
Refills: 0 | Status: COMPLETED | OUTPATIENT
Start: 2021-03-20 | End: 2021-03-20

## 2021-03-20 RX ORDER — VANCOMYCIN HCL 1 G
1250 VIAL (EA) INTRAVENOUS EVERY 12 HOURS
Refills: 0 | Status: DISCONTINUED | OUTPATIENT
Start: 2021-03-21 | End: 2021-03-22

## 2021-03-20 RX ORDER — VANCOMYCIN HCL 1 G
VIAL (EA) INTRAVENOUS
Refills: 0 | Status: DISCONTINUED | OUTPATIENT
Start: 2021-03-20 | End: 2021-03-22

## 2021-03-20 RX ORDER — MIDODRINE HYDROCHLORIDE 2.5 MG/1
5 TABLET ORAL ONCE
Refills: 0 | Status: COMPLETED | OUTPATIENT
Start: 2021-03-20 | End: 2021-03-20

## 2021-03-20 RX ORDER — SODIUM CHLORIDE 9 MG/ML
1000 INJECTION INTRAMUSCULAR; INTRAVENOUS; SUBCUTANEOUS ONCE
Refills: 0 | Status: COMPLETED | OUTPATIENT
Start: 2021-03-20 | End: 2021-03-20

## 2021-03-20 RX ORDER — VANCOMYCIN HCL 1 G
1250 VIAL (EA) INTRAVENOUS ONCE
Refills: 0 | Status: COMPLETED | OUTPATIENT
Start: 2021-03-20 | End: 2021-03-20

## 2021-03-20 RX ADMIN — ENOXAPARIN SODIUM 40 MILLIGRAM(S): 100 INJECTION SUBCUTANEOUS at 11:58

## 2021-03-20 RX ADMIN — SODIUM CHLORIDE 1000 MILLILITER(S): 9 INJECTION INTRAMUSCULAR; INTRAVENOUS; SUBCUTANEOUS at 21:38

## 2021-03-20 RX ADMIN — MEROPENEM 100 MILLIGRAM(S): 1 INJECTION INTRAVENOUS at 16:34

## 2021-03-20 RX ADMIN — Medication 1 APPLICATION(S): at 05:44

## 2021-03-20 RX ADMIN — SODIUM CHLORIDE 1000 MILLILITER(S): 9 INJECTION INTRAMUSCULAR; INTRAVENOUS; SUBCUTANEOUS at 16:16

## 2021-03-20 RX ADMIN — RALOXIFENE HYDROCHLORIDE 60 MILLIGRAM(S): 60 TABLET, COATED ORAL at 14:47

## 2021-03-20 RX ADMIN — MIDODRINE HYDROCHLORIDE 5 MILLIGRAM(S): 2.5 TABLET ORAL at 21:38

## 2021-03-20 RX ADMIN — CHLORHEXIDINE GLUCONATE 1 APPLICATION(S): 213 SOLUTION TOPICAL at 05:44

## 2021-03-20 RX ADMIN — Medication 250 MILLIGRAM(S): at 05:44

## 2021-03-20 RX ADMIN — Medication 1 APPLICATION(S): at 05:45

## 2021-03-20 RX ADMIN — Medication 2000 UNIT(S): at 11:58

## 2021-03-20 RX ADMIN — Medication 1 APPLICATION(S): at 17:01

## 2021-03-20 RX ADMIN — Medication 166.67 MILLIGRAM(S): at 14:47

## 2021-03-20 RX ADMIN — Medication 5 MILLIGRAM(S): at 21:02

## 2021-03-20 RX ADMIN — GABAPENTIN 600 MILLIGRAM(S): 400 CAPSULE ORAL at 11:58

## 2021-03-20 RX ADMIN — Medication 100 MILLIGRAM(S): at 05:44

## 2021-03-20 NOTE — PROGRESS NOTE ADULT - SUBJECTIVE AND OBJECTIVE BOX
Patient is a 55y old  Female who presents with a chief complaint of Hypotension and fever (19 Mar 2021 20:55)      HPI:   54 yo f w/PMH of Down syndrome, osteoporosis, neuropathy, mild anemia, and recent decubitus ulcer to right hip s/p debridement on 3/2 at NH sent from [facility] for hypotension (66/44) and fever (unknown temperature). As per NH paper, patient was taking Vanco and danna for decubitus ulcer and recently developed more wounds to right foot and left foot/heel. Patient baseline non-verbal and unable to give history. Patient had a PICC on R and was scheduled to finish Vanc and Danna on 3/22. Last Vanc trough was 16.7 on 3/17.      OVERNIGHT EVENTS: Patient seen and examined at bedside. No acute overnight event. Patient does not appear to be in any acute distress, lying comfortably on bed.       REVIEW OF SYSTEMS:  Patient nonverbal Could not assess.    FAMILY HISTORY:  No pertinent family history in first degree relatives      T(C): 35.9 (03-20-21 @ 07:53), Max: 37.3 (03-19-21 @ 14:45)  HR: 77 (03-20-21 @ 07:53) (76 - 95)  BP: 91/52 (03-20-21 @ 07:53) (78/44 - 148/68)  RR: 18 (03-20-21 @ 07:53) (18 - 18)  SpO2: 98% (03-20-21 @ 00:03) (98% - 99%)  Wt(kg): --Vital Signs Last 24 Hrs  T(C): 35.9 (20 Mar 2021 07:53), Max: 37.3 (19 Mar 2021 14:45)  T(F): 96.7 (20 Mar 2021 07:53), Max: 99.1 (19 Mar 2021 14:45)  HR: 77 (20 Mar 2021 07:53) (76 - 95)  BP: 91/52 (20 Mar 2021 07:53) (78/44 - 148/68)  BP(mean): 67 (19 Mar 2021 16:30) (67 - 67)  RR: 18 (20 Mar 2021 07:53) (18 - 18)  SpO2: 98% (20 Mar 2021 00:03) (98% - 99%)    PHYSICAL EXAM:  GENERAL: NAD, nonverbal, not in acute distress, lying comfortably on bed  HEAD:  Atraumatic, Normocephalic  CHEST/LUNG: Clear to percussion bilaterally; No rales, rhonchi, wheezing, or rubs  HEART: RRR; No urs, rubs, or gallops  ABDOMEN: Soft, Nontender, Nondistended; Bowel sounds present  EXTREMITIES:   No clubbing, cyanosis, or edema  SKIN: Right hip full thickness wound about 5cm x 7cm and 2cm deep, with  lateral pink granulation and medial devitalized and black necrotic tissue, serosanguineous discharge, no acute bleeding, no pus draining.   Left medial  heel with 4cm x4cm ruptured blister revealing pink ulcer ; devitalized skin   pink/red, no edema, no erythema, no bleeding, no infection.   small wounds - dessicated  ulcers bilateral great toes     Consultant(s) Notes Reviewed:  [x ] YES  [ ] NO  Care Discussed with Consultants/Other Providers [ x] YES  [ ] NO      LABS:                          9.3    6.27  )-----------( 483      ( 20 Mar 2021 06:05 )             29.5     03-20    136  |  98  |  10  ----------------------------<  98  3.9   |  32  |  0.5<L>    Ca    7.9<L>      20 Mar 2021 06:05  Mg     2.0     03-20    TPro  4.9<L>  /  Alb  2.1<L>  /  TBili  <0.2  /  DBili  x   /  AST  30  /  ALT  59<H>  /  AlkPhos  71  03-20      RADIOLOGY & ADDITIONAL TESTS:      < from: Xray Chest 1 View- PORTABLE-Urgent (Xray Chest 1 View- PORTABLE-Urgent .) (03.19.21 @ 01:53) >  Impression:    Right base opacity and a left retrocardiac opacity. Findings may be infectious in nature. Clinical correlation is recommended. Follow to resolution is necessary to exclude underlying process    < end of copied text >      < from: Xray Foot AP + Lateral, Right (03.19.21 @ 02:59) >    Impression    Likely fracture of the tuft of the distal second phalanx.    Diffuse soft tissue swelling, especially along the dorsum of the foot      < end of copied text >        Imaging Personally Reviewed:  [ ] YES  [ ] NO  chlorhexidine 4% Liquid 1 Application(s) Topical <User Schedule>  cholecalciferol 2000 Unit(s) Oral daily  collagenase Ointment 1 Application(s) Topical two times a day  Dakins Solution - 1/2 Strength 1 Application(s) Topical two times a day  enoxaparin Injectable 40 milliGRAM(s) SubCutaneous daily  gabapentin 600 milliGRAM(s) Oral daily  melatonin 5 milliGRAM(s) Oral at bedtime  meropenem  IVPB 1000 milliGRAM(s) IV Intermittent every 24 hours  raloxifene 60 milliGRAM(s) Oral daily  vancomycin  IVPB 1000 milliGRAM(s) IV Intermittent every 12 hours    Assessment/Plan:    54 yo female hx of Down syndrome, osteoporosis, neuropathy, mild anemia, and recent decubitus ulcer to right hip s/p debridement on 3/2 at NH sent from [facility] for hypotension (66/44) and fever.    # Sepsis w/ Multiple ulcers R hip  PNA vs ulcer vs UTI vs PICC line  BP at home 66/44, febrile at home, HR in  positive for SIRS  Chest xray shows R base opacity concerning for infection  On danna and vanc from 3/15 to be completed 3/22  Last vanc trough 16.7 on 3/17  WBC 7.27  Eschar stage IV on R hip  COVID negative  Saturating well on 3L NC  - continue with home vanc and danna for now  - Follow up vanc trough  - Add flagyl  - Burn consulted for possible debridement  - ID consulted  - Follow up esr, crp, procal  - Follow up UA and Bcx  - wean O2 as tolerated    # Osteoporosis  - continue with raloxifene  - continue with Vit D    # Neuropathy  - continue with gabapentin    # Mild anemia  Hb 10.2  As per NH Hb 10.7 on 3/15  - monitor CBC  - Keep active T&S  - Follow up iron studies     # Transaminitis  AST/ALT 53/96  - Follow up RUQ US    Diet: Dysphagia 1 until S&S  Activity: AAT  DVT Prophylaxis: Lovenox  GI Prophylaxis: Not indicated  CHG Order Yes  Code Status: Full  Disposition: From [facility]       Patient is a 55y old  Female who presents with a chief complaint of Hypotension and fever (19 Mar 2021 20:55)      HPI:   54 yo f w/PMH of Down syndrome, osteoporosis, neuropathy, mild anemia, and recent decubitus ulcer to right hip s/p debridement on 3/2 at NH sent from [facility] for hypotension (66/44) and fever (unknown temperature). As per NH paper, patient was taking Vanco and danna for decubitus ulcer and recently developed more wounds to right foot and left foot/heel. Patient baseline non-verbal and unable to give history. Patient had a PICC on R and was scheduled to finish Vanc and Danna on 3/22. Last Vanc trough was 16.7 on 3/17.      OVERNIGHT EVENTS: Patient seen and examined at bedside. No acute overnight event. Patient does not appear to be in any acute distress, lying comfortably on bed.       REVIEW OF SYSTEMS:  Patient nonverbal Could not assess.    FAMILY HISTORY:  No pertinent family history in first degree relatives      T(C): 35.9 (03-20-21 @ 07:53), Max: 37.3 (03-19-21 @ 14:45)  HR: 77 (03-20-21 @ 07:53) (76 - 95)  BP: 91/52 (03-20-21 @ 07:53) (78/44 - 148/68)  RR: 18 (03-20-21 @ 07:53) (18 - 18)  SpO2: 98% (03-20-21 @ 00:03) (98% - 99%)  Wt(kg): --Vital Signs Last 24 Hrs  T(C): 35.9 (20 Mar 2021 07:53), Max: 37.3 (19 Mar 2021 14:45)  T(F): 96.7 (20 Mar 2021 07:53), Max: 99.1 (19 Mar 2021 14:45)  HR: 77 (20 Mar 2021 07:53) (76 - 95)  BP: 91/52 (20 Mar 2021 07:53) (78/44 - 148/68)  BP(mean): 67 (19 Mar 2021 16:30) (67 - 67)  RR: 18 (20 Mar 2021 07:53) (18 - 18)  SpO2: 98% (20 Mar 2021 00:03) (98% - 99%)    PHYSICAL EXAM:  GENERAL: NAD, nonverbal, not in acute distress, lying comfortably on bed  HEAD:  Atraumatic, Normocephalic  CHEST/LUNG: Clear to percussion bilaterally; No rales, rhonchi, wheezing, or rubs  HEART: RRR; No urs, rubs, or gallops  ABDOMEN: Soft, Nontender, Nondistended; Bowel sounds present  EXTREMITIES:   No clubbing, cyanosis, or edema  SKIN: Right hip full thickness wound about 5cm x 7cm and 2cm deep, with  lateral pink granulation and medial devitalized and black necrotic tissue, serosanguineous discharge, no acute bleeding, no pus draining.   Left medial  heel with 4cm x4cm ruptured blister revealing pink ulcer ; devitalized skin   pink/red, no edema, no erythema, no bleeding, no infection.   small wounds - dessicated  ulcers bilateral great toes     Consultant(s) Notes Reviewed:  [x ] YES  [ ] NO  Care Discussed with Consultants/Other Providers [ x] YES  [ ] NO      LABS:                          9.3    6.27  )-----------( 483      ( 20 Mar 2021 06:05 )             29.5     03-20    136  |  98  |  10  ----------------------------<  98  3.9   |  32  |  0.5<L>    Ca    7.9<L>      20 Mar 2021 06:05  Mg     2.0     03-20    TPro  4.9<L>  /  Alb  2.1<L>  /  TBili  <0.2  /  DBili  x   /  AST  30  /  ALT  59<H>  /  AlkPhos  71  03-20      RADIOLOGY & ADDITIONAL TESTS:      < from: Xray Chest 1 View- PORTABLE-Urgent (Xray Chest 1 View- PORTABLE-Urgent .) (03.19.21 @ 01:53) >  Impression:    Right base opacity and a left retrocardiac opacity. Findings may be infectious in nature. Clinical correlation is recommended. Follow to resolution is necessary to exclude underlying process    < end of copied text >      < from: Xray Foot AP + Lateral, Right (03.19.21 @ 02:59) >    Impression    Likely fracture of the tuft of the distal second phalanx.    Diffuse soft tissue swelling, especially along the dorsum of the foot      < end of copied text >        Imaging Personally Reviewed:  [ ] YES  [ ] NO  chlorhexidine 4% Liquid 1 Application(s) Topical <User Schedule>  cholecalciferol 2000 Unit(s) Oral daily  collagenase Ointment 1 Application(s) Topical two times a day  Dakins Solution - 1/2 Strength 1 Application(s) Topical two times a day  enoxaparin Injectable 40 milliGRAM(s) SubCutaneous daily  gabapentin 600 milliGRAM(s) Oral daily  melatonin 5 milliGRAM(s) Oral at bedtime  meropenem  IVPB 1000 milliGRAM(s) IV Intermittent every 24 hours  raloxifene 60 milliGRAM(s) Oral daily  vancomycin  IVPB 1000 milliGRAM(s) IV Intermittent every 12 hours    Assessment/Plan:    54 yo female hx of Down syndrome, osteoporosis, neuropathy, mild anemia, and recent decubitus ulcer to right hip s/p debridement on 3/2 at NH sent from [facility] for hypotension (66/44) and fever.    # Septic on admission w/ Multiple ulcers R hip  PNA vs ulcer vs UTI vs PICC line  -Chest xray shows R base opacity concerning for infection  -On danna and vanc from 3/15 to be completed 3/22  -Last vanc trough 16.7 on 3/17  -WBC 6.27  -Stage 3  on R hip  - Burn on board    - ID on board  - c/w wound care as per Burn  - c/w meropenem  IVPB 1000 milliGRAM(s) IV Intermittent every 24 hours  - c/w vancomycin  IVPB 1000 milliGRAM(s) IV Intermittent every 12 hours  - F/u vanc trough  - F/u  esr, crp, procal  - F/u UA and Bcx    # Osteoporosis  - continue with raloxifene  - continue with Vit D    # Neuropathy  - continue with gabapentin    # Mild anemia  Hb 10.2  As per NH Hb 10.7 on 3/15  - monitor CBC  - Keep active T&S  - Follow up iron studies     # Transaminitis  AST/ALT 53/96  - Follow up RUQ US    Diet: Dysphagia 1 until S&S  Activity: AAT  DVT ppx: Lovenox  GI ppx: Not indicated  Code Status: Full  Disposition: From [facility]       Patient is a 55y old  Female who presents with a chief complaint of Hypotension and fever (19 Mar 2021 20:55)      HPI:   56 yo f w/PMH of Down syndrome, osteoporosis, neuropathy, mild anemia, and recent decubitus ulcer to right hip s/p debridement on 3/2 at NH sent from [facility] for hypotension (66/44) and fever (unknown temperature). As per NH paper, patient was taking Vanco and danna for decubitus ulcer and recently developed more wounds to right foot and left foot/heel. Patient baseline non-verbal and unable to give history. Patient had a PICC on R and was scheduled to finish Vanc and Danna on 3/22. Last Vanc trough was 16.7 on 3/17.      OVERNIGHT EVENTS: Patient seen and examined at bedside. No acute overnight event. Patient does not appear to be in any acute distress, lying comfortably on bed.       REVIEW OF SYSTEMS:  Patient nonverbal Could not assess.    FAMILY HISTORY:  No pertinent family history in first degree relatives      T(C): 35.9 (03-20-21 @ 07:53), Max: 37.3 (03-19-21 @ 14:45)  HR: 77 (03-20-21 @ 07:53) (76 - 95)  BP: 91/52 (03-20-21 @ 07:53) (78/44 - 148/68)  RR: 18 (03-20-21 @ 07:53) (18 - 18)  SpO2: 98% (03-20-21 @ 00:03) (98% - 99%)  Wt(kg): --Vital Signs Last 24 Hrs  T(C): 35.9 (20 Mar 2021 07:53), Max: 37.3 (19 Mar 2021 14:45)  T(F): 96.7 (20 Mar 2021 07:53), Max: 99.1 (19 Mar 2021 14:45)  HR: 77 (20 Mar 2021 07:53) (76 - 95)  BP: 91/52 (20 Mar 2021 07:53) (78/44 - 148/68)  BP(mean): 67 (19 Mar 2021 16:30) (67 - 67)  RR: 18 (20 Mar 2021 07:53) (18 - 18)  SpO2: 98% (20 Mar 2021 00:03) (98% - 99%)    PHYSICAL EXAM:  GENERAL: NAD, nonverbal, not in acute distress, lying comfortably on bed  HEAD:  Atraumatic, Normocephalic  CHEST/LUNG: Clear to percussion bilaterally; No rales, rhonchi, wheezing, or rubs  HEART: RRR; No urs, rubs, or gallops  ABDOMEN: Soft, Nontender, Nondistended; Bowel sounds present  EXTREMITIES:   No clubbing, cyanosis, or edema  SKIN: Right hip full thickness wound about 5cm x 7cm and 2cm deep, with  lateral pink granulation and medial devitalized and black necrotic tissue, serosanguineous discharge, no acute bleeding, no pus draining.   Left medial  heel with 4cm x4cm ruptured blister revealing pink ulcer ; devitalized skin   pink/red, no edema, no erythema, no bleeding, no infection.   small wounds - dessicated  ulcers bilateral great toes     Consultant(s) Notes Reviewed:  [x ] YES  [ ] NO  Care Discussed with Consultants/Other Providers [ x] YES  [ ] NO      LABS:                          9.3    6.27  )-----------( 483      ( 20 Mar 2021 06:05 )             29.5     03-20    136  |  98  |  10  ----------------------------<  98  3.9   |  32  |  0.5<L>    Ca    7.9<L>      20 Mar 2021 06:05  Mg     2.0     03-20    TPro  4.9<L>  /  Alb  2.1<L>  /  TBili  <0.2  /  DBili  x   /  AST  30  /  ALT  59<H>  /  AlkPhos  71  03-20      RADIOLOGY & ADDITIONAL TESTS:      < from: Xray Chest 1 View- PORTABLE-Urgent (Xray Chest 1 View- PORTABLE-Urgent .) (03.19.21 @ 01:53) >  Impression:    Right base opacity and a left retrocardiac opacity. Findings may be infectious in nature. Clinical correlation is recommended. Follow to resolution is necessary to exclude underlying process    < end of copied text >      < from: Xray Foot AP + Lateral, Right (03.19.21 @ 02:59) >    Impression    Likely fracture of the tuft of the distal second phalanx.    Diffuse soft tissue swelling, especially along the dorsum of the foot      < end of copied text >        Imaging Personally Reviewed:  [ ] YES  [ ] NO  chlorhexidine 4% Liquid 1 Application(s) Topical <User Schedule>  cholecalciferol 2000 Unit(s) Oral daily  collagenase Ointment 1 Application(s) Topical two times a day  Dakins Solution - 1/2 Strength 1 Application(s) Topical two times a day  enoxaparin Injectable 40 milliGRAM(s) SubCutaneous daily  gabapentin 600 milliGRAM(s) Oral daily  melatonin 5 milliGRAM(s) Oral at bedtime  meropenem  IVPB 1000 milliGRAM(s) IV Intermittent every 24 hours  raloxifene 60 milliGRAM(s) Oral daily  vancomycin  IVPB 1000 milliGRAM(s) IV Intermittent every 12 hours    Assessment/Plan:    56 yo female hx of Down syndrome, osteoporosis, neuropathy, mild anemia, and recent decubitus ulcer to right hip s/p debridement on 3/2 at NH sent from [facility] for hypotension (66/44) and fever.    # Septic on admission w/ Multiple ulcers R hip  PNA vs ulcer vs UTI vs PICC line  -Chest xray shows R base opacity concerning for infection  -On danna and vanc from 3/15 to be completed 3/22  -Last vanc trough 16.7 on 3/17  -WBC 6.27  -Stage 3 DU  on R hip  - Burn on board    - ID on board  - c/w wound care as per Burn  - c/w meropenem  IVPB 1000 milliGRAM(s) IV Intermittent every 24 hours  - c/w vancomycin  IVPB 1000 milliGRAM(s) IV Intermittent every 12 hours  - F/u vanc trough  - F/u  esr, crp, procal  - F/u UA and Bcx    # Osteoporosis  - continue with raloxifene  - continue with Vit D    # Neuropathy  - continue with gabapentin    # Mild anemia  Hb 10.2  As per NH Hb 10.7 on 3/15  - monitor CBC  - Keep active T&S  - Follow up iron studies     # Transaminitis  AST/ALT 53/96  - Follow up RUQ US    Diet: Dysphagia 1 until S&S  Activity: AAT  DVT ppx: Lovenox  GI ppx: Not indicated  Code Status: Full  Disposition: From [facility]       Patient is a 55y old  Female who presents with a chief complaint of Hypotension and fever (19 Mar 2021 20:55)      HPI:   54 yo f w/PMH of Down syndrome, osteoporosis, neuropathy, mild anemia, and recent decubitus ulcer to right hip s/p debridement on 3/2 at NH sent from [facility] for hypotension (66/44) and fever (unknown temperature). As per NH paper, patient was taking Vanco and danna for decubitus ulcer and recently developed more wounds to right foot and left foot/heel. Patient baseline non-verbal and unable to give history. Patient had a PICC on R and was scheduled to finish Vanc and Danna on 3/22. Last Vanc trough was 16.7 on 3/17.      OVERNIGHT EVENTS: Patient seen and examined at bedside. No acute overnight event. Patient does not appear to be in any acute distress, lying comfortably on bed.       REVIEW OF SYSTEMS:  Patient nonverbal Could not assess.    FAMILY HISTORY:  No pertinent family history in first degree relatives      T(C): 35.9 (03-20-21 @ 07:53), Max: 37.3 (03-19-21 @ 14:45)  HR: 77 (03-20-21 @ 07:53) (76 - 95)  BP: 91/52 (03-20-21 @ 07:53) (78/44 - 148/68)  RR: 18 (03-20-21 @ 07:53) (18 - 18)  SpO2: 98% (03-20-21 @ 00:03) (98% - 99%)  Wt(kg): --Vital Signs Last 24 Hrs  T(C): 35.9 (20 Mar 2021 07:53), Max: 37.3 (19 Mar 2021 14:45)  T(F): 96.7 (20 Mar 2021 07:53), Max: 99.1 (19 Mar 2021 14:45)  HR: 77 (20 Mar 2021 07:53) (76 - 95)  BP: 91/52 (20 Mar 2021 07:53) (78/44 - 148/68)  BP(mean): 67 (19 Mar 2021 16:30) (67 - 67)  RR: 18 (20 Mar 2021 07:53) (18 - 18)  SpO2: 98% (20 Mar 2021 00:03) (98% - 99%)    PHYSICAL EXAM:  GENERAL: NAD, nonverbal, not in acute distress, lying comfortably on bed  HEAD:  Atraumatic, Normocephalic  CHEST/LUNG: Clear to percussion bilaterally; No rales, rhonchi, wheezing, or rubs  HEART: RRR; No urs, rubs, or gallops  ABDOMEN: Soft, Nontender, Nondistended; Bowel sounds present  EXTREMITIES:   No clubbing, cyanosis, or edema  SKIN: Right hip full thickness wound about 5cm x 7cm and 2cm deep, with  lateral pink granulation and medial devitalized and black necrotic tissue, serosanguineous discharge, no acute bleeding, no pus draining.   Left medial  heel with 4cm x4cm ruptured blister revealing pink ulcer ; devitalized skin   pink/red, no edema, no erythema, no bleeding, no infection.   small wounds - dessicated  ulcers bilateral great toes     Consultant(s) Notes Reviewed:  [x ] YES  [ ] NO  Care Discussed with Consultants/Other Providers [ x] YES  [ ] NO      LABS:                          9.3    6.27  )-----------( 483      ( 20 Mar 2021 06:05 )             29.5     03-20    136  |  98  |  10  ----------------------------<  98  3.9   |  32  |  0.5<L>    Ca    7.9<L>      20 Mar 2021 06:05  Mg     2.0     03-20    TPro  4.9<L>  /  Alb  2.1<L>  /  TBili  <0.2  /  DBili  x   /  AST  30  /  ALT  59<H>  /  AlkPhos  71  03-20      RADIOLOGY & ADDITIONAL TESTS:      < from: Xray Chest 1 View- PORTABLE-Urgent (Xray Chest 1 View- PORTABLE-Urgent .) (03.19.21 @ 01:53) >  Impression:    Right base opacity and a left retrocardiac opacity. Findings may be infectious in nature. Clinical correlation is recommended. Follow to resolution is necessary to exclude underlying process    < end of copied text >      < from: Xray Foot AP + Lateral, Right (03.19.21 @ 02:59) >    Impression    Likely fracture of the tuft of the distal second phalanx.    Diffuse soft tissue swelling, especially along the dorsum of the foot      < end of copied text >        Imaging Personally Reviewed:  [ ] YES  [ ] NO  chlorhexidine 4% Liquid 1 Application(s) Topical <User Schedule>  cholecalciferol 2000 Unit(s) Oral daily  collagenase Ointment 1 Application(s) Topical two times a day  Dakins Solution - 1/2 Strength 1 Application(s) Topical two times a day  enoxaparin Injectable 40 milliGRAM(s) SubCutaneous daily  gabapentin 600 milliGRAM(s) Oral daily  melatonin 5 milliGRAM(s) Oral at bedtime  meropenem  IVPB 1000 milliGRAM(s) IV Intermittent every 24 hours  raloxifene 60 milliGRAM(s) Oral daily  vancomycin  IVPB 1000 milliGRAM(s) IV Intermittent every 12 hours    Assessment/Plan:    54 yo female hx of Down syndrome, osteoporosis, neuropathy, mild anemia, and recent decubitus ulcer to right hip s/p debridement on 3/2 at NH sent from [facility] for hypotension (66/44) and fever.    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  - Burn on board    - ID on board  - c/w wound care as per Burn  - c/w meropenem  IVPB 1000 milliGRAM(s) IV Intermittent every 24 hours  -vanc trough 7.3; needs to be 10-15 as per ID   - increase vancomycin  IVPB 1250 milliGRAM(s) IV Intermittent every 12 hours  - F/u  esr, crp, procal  - F/u UA and Bcx  - F/u fungitell     # Osteoporosis  - continue with raloxifene  - continue with Vit D    # Neuropathy  - continue with gabapentin    # Mild anemia  Hb 10.2  As per NH Hb 10.7 on 3/15  - monitor CBC  - Keep active T&S  - Follow up iron studies     # Transaminitis  AST/ALT 53/96  - Follow up RUQ US    Diet: Dysphagia 1 until S&S  Activity: AAT  DVT ppx: Lovenox  GI ppx: Not indicated  Code Status: Full  [facility]       Patient is a 55y old  Female who presents with a chief complaint of Hypotension and fever (19 Mar 2021 20:55)      HPI:   56 yo f w/PMH of Down syndrome, osteoporosis, neuropathy, mild anemia, and recent decubitus ulcer to right hip s/p debridement on 3/2 at NH sent from [facility] for hypotension (66/44) and fever (unknown temperature). As per NH paper, patient was taking Vanco and danna for decubitus ulcer and recently developed more wounds to right foot and left foot/heel. Patient baseline non-verbal and unable to give history. Patient had a PICC on R and was scheduled to finish Vanc and Danna on 3/22. Last Vanc trough was 16.7 on 3/17.      OVERNIGHT EVENTS: Patient seen and examined at bedside. No acute overnight event. Patient does not appear to be in any acute distress, lying comfortably on bed.       REVIEW OF SYSTEMS:  Patient nonverbal. Could not assess.    FAMILY HISTORY:  No pertinent family history       T(C): 35.9 (03-20-21 @ 07:53), Max: 37.3 (03-19-21 @ 14:45)  HR: 77 (03-20-21 @ 07:53) (76 - 95)  BP: 91/52 (03-20-21 @ 07:53) (78/44 - 148/68)  RR: 18 (03-20-21 @ 07:53) (18 - 18)  SpO2: 98% (03-20-21 @ 00:03) (98% - 99%)  Wt(kg): --Vital Signs Last 24 Hrs  T(C): 35.9 (20 Mar 2021 07:53), Max: 37.3 (19 Mar 2021 14:45)  T(F): 96.7 (20 Mar 2021 07:53), Max: 99.1 (19 Mar 2021 14:45)  HR: 77 (20 Mar 2021 07:53) (76 - 95)  BP: 91/52 (20 Mar 2021 07:53) (78/44 - 148/68)  BP(mean): 67 (19 Mar 2021 16:30) (67 - 67)  RR: 18 (20 Mar 2021 07:53) (18 - 18)  SpO2: 98% (20 Mar 2021 00:03) (98% - 99%)    PHYSICAL EXAM:  GENERAL: NAD, nonverbal, not in acute distress, lying comfortably on bed  HEAD:  Atraumatic, Normocephalic  CHEST/LUNG: Clear to percussion bilaterally; No rales, rhonchi, wheezing, or rubs  HEART: RRR; No urs, rubs, or gallops  ABDOMEN: Soft, Nontender, Nondistended; Bowel sounds present  EXTREMITIES:   No clubbing, cyanosis, or edema  SKIN: Right hip full thickness wound about 5cm x 7cm and 2cm deep, with  lateral pink granulation and medial devitalized and black necrotic tissue, serosanguineous discharge, no acute bleeding, no pus draining.   Left medial  heel with 4cm x4cm ruptured blister revealing pink ulcer ; devitalized skin   pink/red, no edema, no erythema, no bleeding, no infection.   small wounds - dessicated  ulcers bilateral great toes     Consultant(s) Notes Reviewed:  [x ] YES  [ ] NO  Care Discussed with Consultants/Other Providers [ x] YES  [ ] NO      LABS:                          9.3    6.27  )-----------( 483      ( 20 Mar 2021 06:05 )             29.5     03-20    136  |  98  |  10  ----------------------------<  98  3.9   |  32  |  0.5<L>    Ca    7.9<L>      20 Mar 2021 06:05  Mg     2.0     03-20    TPro  4.9<L>  /  Alb  2.1<L>  /  TBili  <0.2  /  DBili  x   /  AST  30  /  ALT  59<H>  /  AlkPhos  71  03-20      RADIOLOGY & ADDITIONAL TESTS:      < from: Xray Chest 1 View- PORTABLE-Urgent (Xray Chest 1 View- PORTABLE-Urgent .) (03.19.21 @ 01:53) >  Impression:    Right base opacity and a left retrocardiac opacity. Findings may be infectious in nature. Clinical correlation is recommended. Follow to resolution is necessary to exclude underlying process    < end of copied text >      < from: Xray Foot AP + Lateral, Right (03.19.21 @ 02:59) >    Impression    Likely fracture of the tuft of the distal second phalanx.    Diffuse soft tissue swelling, especially along the dorsum of the foot      < end of copied text >        Imaging Personally Reviewed:  [ ] YES  [ ] NO  chlorhexidine 4% Liquid 1 Application(s) Topical <User Schedule>  cholecalciferol 2000 Unit(s) Oral daily  collagenase Ointment 1 Application(s) Topical two times a day  Dakins Solution - 1/2 Strength 1 Application(s) Topical two times a day  enoxaparin Injectable 40 milliGRAM(s) SubCutaneous daily  gabapentin 600 milliGRAM(s) Oral daily  melatonin 5 milliGRAM(s) Oral at bedtime  meropenem  IVPB 1000 milliGRAM(s) IV Intermittent every 24 hours  raloxifene 60 milliGRAM(s) Oral daily  vancomycin  IVPB 1000 milliGRAM(s) IV Intermittent every 12 hours    Assessment/Plan:    56 yo female hx of Down syndrome, osteoporosis, neuropathy, mild anemia, and recent decubitus ulcer to right hip s/p debridement on 3/2 at NH sent from [facility] for hypotension (66/44) and fever.    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  - Burn on board    - ID on board  - c/w wound care as per Burn  - c/w meropenem  IVPB 1000 milliGRAM(s) IV Intermittent every 24 hours  -vanc trough 7.3; needs to be 10-15 as per ID   - increase vancomycin  IVPB 1250 milliGRAM(s) IV Intermittent every 12 hours  - F/u  esr, crp, procal  - F/u UA and Bcx  - F/u fungitell     # Osteoporosis  - continue with raloxifene  - continue with Vit D    # Neuropathy  - continue with gabapentin    # Mild anemia  Hb 10.2  As per NH Hb 10.7 on 3/15  - monitor CBC  - Keep active T&S  - Follow up iron studies     # Transaminitis  AST/ALT 53/96  - Follow up RUQ US    Diet: Dysphagia 1 until S&S  Activity: AAT  DVT ppx: Lovenox  GI ppx: Not indicated  Code Status: Full  Disposition: From [facility]

## 2021-03-20 NOTE — PROGRESS NOTE ADULT - SUBJECTIVE AND OBJECTIVE BOX
Chart Summary:  "54 yo f w/PMH of Down syndrome, osteoporosis, neuropathy, mild anemia, and recent decubitus ulcer to right hip s/p debridement on 3/2 at NH sent from [facility] for hypotension (66/44) and fever (unknown temperature). As per NH paper, patient was taking Vanco and danna for decubitus ulcer and recently developed more wounds to right foot and left foot/heel. Patient baseline non-verbal and unable to give history. Patient had a PICC on R and was scheduled to finish Vanc and Danna on 3/22. Last Vanc trough was 16.7 on 3/17. "    OVERNIGHT EVENTS:  No overnight events noted.  Pt is awake, non-verbal which is her baseline.  Looks comfortable.      PAST MEDICAL & SURGICAL HISTORY:  Neuropathy    Mild anemia    Osteoporosis    Down syndrome    S/P debridement  of R hip on 3/2/21    VITALS:  T(F): 96.7 (03-20-21 @ 07:53), Max: 99.1 (03-19-21 @ 14:45)  HR: 77 (03-20-21 @ 07:53)  BP: 91/52 (03-20-21 @ 07:53) (78/44 - 148/68)  RR: 18 (03-20-21 @ 07:53)  SpO2: 98% (03-20-21 @ 00:03)    + Facial features c/w Down syndrome  Chest CTA anteriorly.  Cor Reg S1S2.  Abd soft and NT.  Dressing noted R hip and also R heel and foot.                  9.3    6.27  )-----------( 483      ( 20 Mar 2021 06:05 )             29.5       03-20    136  |  98  |  10  ----------------------------<  98  3.9   |  32  |  0.5<L>    Ca    7.9<L>      20 Mar 2021 06:05  Mg     2.0     03-20    TPro  4.9<L>  /  Alb  2.1<L>  /  TBili  <0.2  /  DBili  x   /  AST  30  /  ALT  59<H>  /  AlkPhos  71  03-20    LIVER FUNCTIONS - ( 20 Mar 2021 06:05 )  Alb: 2.1 g/dL / Pro: 4.9 g/dL / ALK PHOS: 71 U/L / ALT: 59 U/L / AST: 30 U/L / GGT: x           Culture - Other (collected 03-19-21 @ 02:30)  Source: .Other right hip wound  Preliminary Report (03-20-21 @ 11:12):    Culture yields growth of greater than 3 colony types of    bacteria,  which may indicate contamination and normal daniele    Call client services within 7 days if further workup is clinically    indicated.    MEDICATIONS:  MEDICATIONS  (STANDING):  chlorhexidine 4% Liquid 1 Application(s) Topical <User Schedule>  cholecalciferol 2000 Unit(s) Oral daily  collagenase Ointment 1 Application(s) Topical two times a day  Dakins Solution - 1/2 Strength 1 Application(s) Topical two times a day  enoxaparin Injectable 40 milliGRAM(s) SubCutaneous daily  gabapentin 600 milliGRAM(s) Oral daily  melatonin 5 milliGRAM(s) Oral at bedtime  meropenem  IVPB 1000 milliGRAM(s) IV Intermittent every 24 hours  raloxifene 60 milliGRAM(s) Oral daily  vancomycin  IVPB      vancomycin  IVPB 1250 milliGRAM(s) IV Intermittent once

## 2021-03-20 NOTE — PROGRESS NOTE ADULT - ASSESSMENT
56 yo f w/PMH of Down syndrome, osteoporosis, neuropathy, mild anemia, and recent decubitus ulcer to right hip s/p debridement on 3/2 at NH sent from [facility] for hypotension (66/44) and fever (unknown temperature).     Infected DU R hip and new DU left foot and heel (not infected)-  f/up burn service and ID recommendations.      Continue local wound care.  Nutrition to f/up.  DU / PU prevention protocol per hosp's policy.    Continue IV Vanco and Meropen for now as pt's hemodynamic improved, no fever and no leukocytosis.    Re-culture if spike fever and per ID, to consider adding anti-fungal agent.    DVT prophylaxis.

## 2021-03-21 LAB
ALBUMIN SERPL ELPH-MCNC: 2.1 G/DL — LOW (ref 3.5–5.2)
ALP SERPL-CCNC: 66 U/L — SIGNIFICANT CHANGE UP (ref 30–115)
ALT FLD-CCNC: 37 U/L — SIGNIFICANT CHANGE UP (ref 0–41)
ANION GAP SERPL CALC-SCNC: 7 MMOL/L — SIGNIFICANT CHANGE UP (ref 7–14)
AST SERPL-CCNC: 20 U/L — SIGNIFICANT CHANGE UP (ref 0–41)
BILIRUB SERPL-MCNC: <0.2 MG/DL — SIGNIFICANT CHANGE UP (ref 0.2–1.2)
BUN SERPL-MCNC: 7 MG/DL — LOW (ref 10–20)
CALCIUM SERPL-MCNC: 7.7 MG/DL — LOW (ref 8.5–10.1)
CHLORIDE SERPL-SCNC: 102 MMOL/L — SIGNIFICANT CHANGE UP (ref 98–110)
CO2 SERPL-SCNC: 31 MMOL/L — SIGNIFICANT CHANGE UP (ref 17–32)
CREAT SERPL-MCNC: 0.5 MG/DL — LOW (ref 0.7–1.5)
GLUCOSE SERPL-MCNC: 133 MG/DL — HIGH (ref 70–99)
HCT VFR BLD CALC: 30 % — LOW (ref 37–47)
HGB BLD-MCNC: 9.4 G/DL — LOW (ref 12–16)
MCHC RBC-ENTMCNC: 26.7 PG — LOW (ref 27–31)
MCHC RBC-ENTMCNC: 31.3 G/DL — LOW (ref 32–37)
MCV RBC AUTO: 85.2 FL — SIGNIFICANT CHANGE UP (ref 81–99)
NRBC # BLD: 0 /100 WBCS — SIGNIFICANT CHANGE UP (ref 0–0)
PLATELET # BLD AUTO: 588 K/UL — HIGH (ref 130–400)
POTASSIUM SERPL-MCNC: 4.3 MMOL/L — SIGNIFICANT CHANGE UP (ref 3.5–5)
POTASSIUM SERPL-SCNC: 4.3 MMOL/L — SIGNIFICANT CHANGE UP (ref 3.5–5)
PROT SERPL-MCNC: 4.6 G/DL — LOW (ref 6–8)
RBC # BLD: 3.52 M/UL — LOW (ref 4.2–5.4)
RBC # FLD: 15.2 % — HIGH (ref 11.5–14.5)
SODIUM SERPL-SCNC: 140 MMOL/L — SIGNIFICANT CHANGE UP (ref 135–146)
WBC # BLD: 6.19 K/UL — SIGNIFICANT CHANGE UP (ref 4.8–10.8)
WBC # FLD AUTO: 6.19 K/UL — SIGNIFICANT CHANGE UP (ref 4.8–10.8)

## 2021-03-21 PROCEDURE — 99232 SBSQ HOSP IP/OBS MODERATE 35: CPT

## 2021-03-21 RX ORDER — MIDODRINE HYDROCHLORIDE 2.5 MG/1
10 TABLET ORAL THREE TIMES A DAY
Refills: 0 | Status: DISCONTINUED | OUTPATIENT
Start: 2021-03-21 | End: 2021-03-21

## 2021-03-21 RX ORDER — SODIUM CHLORIDE 9 MG/ML
500 INJECTION INTRAMUSCULAR; INTRAVENOUS; SUBCUTANEOUS ONCE
Refills: 0 | Status: COMPLETED | OUTPATIENT
Start: 2021-03-21 | End: 2021-03-21

## 2021-03-21 RX ORDER — MIDODRINE HYDROCHLORIDE 2.5 MG/1
10 TABLET ORAL THREE TIMES A DAY
Refills: 0 | Status: DISCONTINUED | OUTPATIENT
Start: 2021-03-21 | End: 2021-03-26

## 2021-03-21 RX ADMIN — MIDODRINE HYDROCHLORIDE 10 MILLIGRAM(S): 2.5 TABLET ORAL at 12:33

## 2021-03-21 RX ADMIN — Medication 166.67 MILLIGRAM(S): at 17:28

## 2021-03-21 RX ADMIN — MIDODRINE HYDROCHLORIDE 10 MILLIGRAM(S): 2.5 TABLET ORAL at 17:28

## 2021-03-21 RX ADMIN — Medication 2000 UNIT(S): at 12:29

## 2021-03-21 RX ADMIN — MEROPENEM 100 MILLIGRAM(S): 1 INJECTION INTRAVENOUS at 17:28

## 2021-03-21 RX ADMIN — SODIUM CHLORIDE 500 MILLILITER(S): 9 INJECTION INTRAMUSCULAR; INTRAVENOUS; SUBCUTANEOUS at 08:45

## 2021-03-21 RX ADMIN — Medication 1 APPLICATION(S): at 17:28

## 2021-03-21 RX ADMIN — Medication 1 APPLICATION(S): at 05:00

## 2021-03-21 RX ADMIN — Medication 166.67 MILLIGRAM(S): at 05:00

## 2021-03-21 RX ADMIN — RALOXIFENE HYDROCHLORIDE 60 MILLIGRAM(S): 60 TABLET, COATED ORAL at 12:29

## 2021-03-21 RX ADMIN — CHLORHEXIDINE GLUCONATE 1 APPLICATION(S): 213 SOLUTION TOPICAL at 05:00

## 2021-03-21 RX ADMIN — Medication 5 MILLIGRAM(S): at 21:02

## 2021-03-21 RX ADMIN — GABAPENTIN 600 MILLIGRAM(S): 400 CAPSULE ORAL at 12:29

## 2021-03-21 RX ADMIN — ENOXAPARIN SODIUM 40 MILLIGRAM(S): 100 INJECTION SUBCUTANEOUS at 12:30

## 2021-03-21 NOTE — PROGRESS NOTE ADULT - SUBJECTIVE AND OBJECTIVE BOX
Chart Summary:  "54 yo f w/PMH of Down syndrome, osteoporosis, neuropathy, mild anemia, and recent decubitus ulcer to right hip s/p debridement on 3/2 at NH sent from [facility] for hypotension (66/44) and fever (unknown temperature). As per NH paper, patient was taking Vanco and danna for decubitus ulcer and recently developed more wounds to right foot and left foot/heel. Patient baseline non-verbal and unable to give history. Patient had a PICC on R and was scheduled to finish Vanc and Danna on 3/22. Last Vanc trough was 16.7 on 3/17. "    Since hospitalization, consulted by ID and Burn for infected ulcer R hip.  To continue IV vanco and Meropenem as recommended.        OVERNIGHT EVENTS:  No overnight events noted.  But was noted to be hypotensive this AM.  s/p IVF bolus but SBP remained low.  Started on po Midodrine with repeat vitals to be closely monitored.    PAST MEDICAL & SURGICAL HISTORY:  Neuropathy  Mild anemia  Osteoporosis  Down syndrome  S/P debridement  of R hip on 3/2/21      VITALS:  T(F): 96.1 (03-21-21 @ 07:46), Max: 96.7 (03-21-21 @ 00:00)  HR: 76 (03-21-21 @ 12:11)  BP: 76/43 (03-21-21 @ 12:11) (76/43 - 97/62)  RR: 18 (03-21-21 @ 07:46)    Awake.  Chest CTA b/l.  Cor S1S2, not tachy.  Abd soft and NT.  R hip, R and L foot - dressings in place.    Per staff evaluation of skin:  R hip full thickness ulcer with necrotic / escar   R forefoot blister   L heel and forefoot ruptured blister noted      TESTS & MEASUREMENTS:      03-20-21 @ 07:01  -  03-21-21 @ 07:00  --------------------------------------------------------  IN: 1120 mL / OUT: 0 mL / NET: 1120 mL    03-21-21 @ 07:01  -  03-21-21 @ 13:55  --------------------------------------------------------  IN: 320 mL / OUT: 0 mL / NET: 320 mL    Vanco trough 7.3 (3/20)                        9.3    6.27  )-----------( 483      ( 20 Mar 2021 06:05 )             29.5       03-20    136  |  98  |  10  ----------------------------<  98  3.9   |  32  |  0.5<L>    Ca    7.9<L>      20 Mar 2021 06:05  Mg     2.0     03-20    TPro  4.9<L>  /  Alb  2.1<L>  /  TBili  <0.2  /  DBili  x   /  AST  30  /  ALT  59<H>  /  AlkPhos  71  03-20    LIVER FUNCTIONS - ( 20 Mar 2021 06:05 )  Alb: 2.1 g/dL / Pro: 4.9 g/dL / ALK PHOS: 71 U/L / ALT: 59 U/L / AST: 30 U/L / GGT: x                 Culture - Other (collected 03-19-21 @ 02:30)  Source: .Other right hip wound  Preliminary Report (03-20-21 @ 11:12):    Culture yields growth of greater than 3 colony types of    bacteria,  which may indicate contamination and normal daniele    Call client services within 7 days if further workup is clinically    indicated.    Culture - Blood (collected 03-19-21 @ 02:30)  Source: .Blood Blood  Preliminary Report (03-20-21 @ 14:01):    No growth to date.    Culture - Blood (collected 03-19-21 @ 02:30)  Source: .Blood  Preliminary Report (03-20-21 @ 14:01):    No growth to date.    RADIOLOGY & ADDITIONAL TESTS:    MEDICATIONS:  MEDICATIONS  (STANDING):  chlorhexidine 4% Liquid 1 Application(s) Topical <User Schedule>  cholecalciferol 2000 Unit(s) Oral daily  collagenase Ointment 1 Application(s) Topical two times a day  Dakins Solution - 1/2 Strength 1 Application(s) Topical two times a day  enoxaparin Injectable 40 milliGRAM(s) SubCutaneous daily  gabapentin 600 milliGRAM(s) Oral daily  melatonin 5 milliGRAM(s) Oral at bedtime  meropenem  IVPB 1000 milliGRAM(s) IV Intermittent every 24 hours  midodrine. 10 milliGRAM(s) Oral three times a day  raloxifene 60 milliGRAM(s) Oral daily  vancomycin  IVPB      vancomycin  IVPB 1250 milliGRAM(s) IV Intermittent every 12 hours

## 2021-03-21 NOTE — PROGRESS NOTE ADULT - ASSESSMENT
Infected DU R hip and new DU left foot and heel (not infected)-  hypotensive this AM.  S/p IVF bolus.  Repeat labs today.  Agree with adding Midodrine 10 mg po TID.  Vanco trough level is low, dose of Vanco increased to 1.25 gm q 12 H.    Per ID, may add Caspofungin 70 mg x 1 if unstable.  Bl CS negative.  Fungitell result pending.       Infected DU R hip and new DU left foot and heel (not infected)-  hypotensive this AM.  S/p IVF bolus.  Repeat labs today.  Agree with adding Midodrine 10 mg po TID.  Vanco trough level is low, dose of Vanco increased to 1.25 gm q 12 H.    Per ID, may add Caspofungin 70 mg x 1 if unstable.  Bl CS negative.  Fungitell result pending.    Addendum:  bp improved to 105/55 - to continue close monitoring.

## 2021-03-21 NOTE — DIETITIAN INITIAL EVALUATION ADULT. - ADD RECOMMEND
Continue current diet order as tolerated. Add prosourc gelatin plus BID to optimize protein intake. If not contraindicated, order Multivitamin 500mg q24hrs and vit C 500mg q24hrs x 14 days

## 2021-03-21 NOTE — DIETITIAN INITIAL EVALUATION ADULT. - OTHER CALCULATIONS
wt used: 47.6kg CBW kcal: 1164-1261kcal (MSJ x 1.2-1.3AF) protein: 47-57g (1-1.2g/kg CBW) fluids: 1ml/kcal or per LIP

## 2021-03-21 NOTE — DIETITIAN INITIAL EVALUATION ADULT. - OTHER INFO
54 yo F with PMH down syndrome, mild anemia, and recent decubitus ulcer to right hip s/p debridement on 3/2 at NH sent from [facility] for hypotension and fever. Admitted for infected DU R hip and new DU left foot and heel. s/s eval (3/19) recommended dysphagia 1 (puree) and nectar-thick liquids. (Was on this diet in NH)    Nutrition hx obtained from emergency contact (private care RN) as pt confused with down syndrome. Reports pt has been in NH since 3/9, typically lives in foster home with a foster mother. Reports pt has good appetite, eats mostly soft food. Denies use of oral nutrition supplements, takes vit D. Confirms NKFA. No Voodoo or cultural food preferences.     In house appetite and po intake are variable, % per RN flow sheets. LBM 3/19, no c/o N+V, constipation or diarrhea.     -110lbs per home care RN. Unable to comment if pt lost wt in the past 2 weeks since being in NH.    Ht:   58"  Wt:   105lbs  IBW: 95lbs  +/-10%    BMI: 21.9  kg/m2     Skin: stage 3 R hip pressure injury, Left heel partial thickness wound- Stage 2 per BURN

## 2021-03-21 NOTE — DIETITIAN INITIAL EVALUATION ADULT. - REASON INDICATOR FOR ASSESSMENT
stage 2 or greater pressure injury consult  For the future, contact pt's foster mother Ana Massey @ [phone]

## 2021-03-21 NOTE — DIETITIAN INITIAL EVALUATION ADULT. - NAME AND PHONE
Nutrition Interventions: meals and snacks, medical food supplements, vitamin/mineral supplementation RD to monitor diet order, energy intake, body composition, NFPF

## 2021-03-22 LAB
ALBUMIN SERPL ELPH-MCNC: 2 G/DL — LOW (ref 3.5–5.2)
ALP SERPL-CCNC: 62 U/L — SIGNIFICANT CHANGE UP (ref 30–115)
ALT FLD-CCNC: 31 U/L — SIGNIFICANT CHANGE UP (ref 0–41)
ANION GAP SERPL CALC-SCNC: 7 MMOL/L — SIGNIFICANT CHANGE UP (ref 7–14)
AST SERPL-CCNC: 17 U/L — SIGNIFICANT CHANGE UP (ref 0–41)
BASOPHILS # BLD AUTO: 0.07 K/UL — SIGNIFICANT CHANGE UP (ref 0–0.2)
BASOPHILS NFR BLD AUTO: 1.1 % — HIGH (ref 0–1)
BILIRUB SERPL-MCNC: <0.2 MG/DL — SIGNIFICANT CHANGE UP (ref 0.2–1.2)
BUN SERPL-MCNC: 7 MG/DL — LOW (ref 10–20)
CALCIUM SERPL-MCNC: 7.9 MG/DL — LOW (ref 8.5–10.1)
CHLORIDE SERPL-SCNC: 101 MMOL/L — SIGNIFICANT CHANGE UP (ref 98–110)
CO2 SERPL-SCNC: 32 MMOL/L — SIGNIFICANT CHANGE UP (ref 17–32)
CREAT SERPL-MCNC: 0.5 MG/DL — LOW (ref 0.7–1.5)
EOSINOPHIL # BLD AUTO: 0.25 K/UL — SIGNIFICANT CHANGE UP (ref 0–0.7)
EOSINOPHIL NFR BLD AUTO: 3.9 % — SIGNIFICANT CHANGE UP (ref 0–8)
GLUCOSE SERPL-MCNC: 141 MG/DL — HIGH (ref 70–99)
HCT VFR BLD CALC: 28.4 % — LOW (ref 37–47)
HGB BLD-MCNC: 8.7 G/DL — LOW (ref 12–16)
IMM GRANULOCYTES NFR BLD AUTO: 0.3 % — SIGNIFICANT CHANGE UP (ref 0.1–0.3)
LACTATE SERPL-SCNC: 2.2 MMOL/L — HIGH (ref 0.7–2)
LYMPHOCYTES # BLD AUTO: 1.15 K/UL — LOW (ref 1.2–3.4)
LYMPHOCYTES # BLD AUTO: 18 % — LOW (ref 20.5–51.1)
MAGNESIUM SERPL-MCNC: 2.1 MG/DL — SIGNIFICANT CHANGE UP (ref 1.8–2.4)
MCHC RBC-ENTMCNC: 26.8 PG — LOW (ref 27–31)
MCHC RBC-ENTMCNC: 30.6 G/DL — LOW (ref 32–37)
MCV RBC AUTO: 87.4 FL — SIGNIFICANT CHANGE UP (ref 81–99)
MONOCYTES # BLD AUTO: 0.48 K/UL — SIGNIFICANT CHANGE UP (ref 0.1–0.6)
MONOCYTES NFR BLD AUTO: 7.5 % — SIGNIFICANT CHANGE UP (ref 1.7–9.3)
MRSA PCR RESULT.: NEGATIVE — SIGNIFICANT CHANGE UP
NEUTROPHILS # BLD AUTO: 4.41 K/UL — SIGNIFICANT CHANGE UP (ref 1.4–6.5)
NEUTROPHILS NFR BLD AUTO: 69.2 % — SIGNIFICANT CHANGE UP (ref 42.2–75.2)
NRBC # BLD: 0 /100 WBCS — SIGNIFICANT CHANGE UP (ref 0–0)
PLATELET # BLD AUTO: 555 K/UL — HIGH (ref 130–400)
POTASSIUM SERPL-MCNC: 4.3 MMOL/L — SIGNIFICANT CHANGE UP (ref 3.5–5)
POTASSIUM SERPL-SCNC: 4.3 MMOL/L — SIGNIFICANT CHANGE UP (ref 3.5–5)
PROT SERPL-MCNC: 4.6 G/DL — LOW (ref 6–8)
RBC # BLD: 3.25 M/UL — LOW (ref 4.2–5.4)
RBC # FLD: 15.3 % — HIGH (ref 11.5–14.5)
SODIUM SERPL-SCNC: 140 MMOL/L — SIGNIFICANT CHANGE UP (ref 135–146)
VANCOMYCIN TROUGH SERPL-MCNC: 25.5 UG/ML — HIGH (ref 5–10)
WBC # BLD: 6.38 K/UL — SIGNIFICANT CHANGE UP (ref 4.8–10.8)
WBC # FLD AUTO: 6.38 K/UL — SIGNIFICANT CHANGE UP (ref 4.8–10.8)

## 2021-03-22 PROCEDURE — 99232 SBSQ HOSP IP/OBS MODERATE 35: CPT

## 2021-03-22 RX ORDER — VANCOMYCIN HCL 1 G
750 VIAL (EA) INTRAVENOUS EVERY 12 HOURS
Refills: 0 | Status: DISCONTINUED | OUTPATIENT
Start: 2021-03-22 | End: 2021-03-25

## 2021-03-22 RX ORDER — LIDOCAINE HYDROCHLORIDE AND EPINEPHRINE 10; 10 MG/ML; UG/ML
40 INJECTION, SOLUTION INFILTRATION; PERINEURAL ONCE
Refills: 0 | Status: DISCONTINUED | OUTPATIENT
Start: 2021-03-22 | End: 2021-03-26

## 2021-03-22 RX ADMIN — Medication 1 APPLICATION(S): at 05:41

## 2021-03-22 RX ADMIN — MEROPENEM 100 MILLIGRAM(S): 1 INJECTION INTRAVENOUS at 16:50

## 2021-03-22 RX ADMIN — Medication 1 APPLICATION(S): at 17:00

## 2021-03-22 RX ADMIN — Medication 5 MILLIGRAM(S): at 21:14

## 2021-03-22 RX ADMIN — MIDODRINE HYDROCHLORIDE 10 MILLIGRAM(S): 2.5 TABLET ORAL at 11:04

## 2021-03-22 RX ADMIN — MIDODRINE HYDROCHLORIDE 10 MILLIGRAM(S): 2.5 TABLET ORAL at 16:51

## 2021-03-22 RX ADMIN — Medication 250 MILLIGRAM(S): at 21:40

## 2021-03-22 RX ADMIN — Medication 166.67 MILLIGRAM(S): at 05:40

## 2021-03-22 RX ADMIN — CHLORHEXIDINE GLUCONATE 1 APPLICATION(S): 213 SOLUTION TOPICAL at 05:50

## 2021-03-22 RX ADMIN — ENOXAPARIN SODIUM 40 MILLIGRAM(S): 100 INJECTION SUBCUTANEOUS at 11:04

## 2021-03-22 RX ADMIN — Medication 1 APPLICATION(S): at 17:01

## 2021-03-22 RX ADMIN — GABAPENTIN 600 MILLIGRAM(S): 400 CAPSULE ORAL at 11:04

## 2021-03-22 RX ADMIN — Medication 1 APPLICATION(S): at 05:40

## 2021-03-22 RX ADMIN — MIDODRINE HYDROCHLORIDE 10 MILLIGRAM(S): 2.5 TABLET ORAL at 05:39

## 2021-03-22 RX ADMIN — Medication 2000 UNIT(S): at 11:04

## 2021-03-22 RX ADMIN — RALOXIFENE HYDROCHLORIDE 60 MILLIGRAM(S): 60 TABLET, COATED ORAL at 11:04

## 2021-03-22 NOTE — PROGRESS NOTE ADULT - SUBJECTIVE AND OBJECTIVE BOX
Chart Summary:  "54 yo f w/PMH of Down syndrome, osteoporosis, neuropathy, mild anemia, and recent decubitus ulcer to right hip s/p debridement on 3/2 at NH sent from [facility] for hypotension (66/44) and fever (unknown temperature). As per NH paper, patient was taking Vanco and danna for decubitus ulcer and recently developed more wounds to right foot and left foot/heel. Patient baseline non-verbal and unable to give history. Patient had a PICC on R and was scheduled to finish Vanc and Danna on 3/22. Last Vanc trough was 16.7 on 3/17. "    Since hospitalization, episodes of hypotension noted but clinically asymptomatic.  No fever though temp tends to be on lower end.  Not tachy.  Mentation is at baseline.  Pt does not appear to be in distress.  consulted by ID and Burn for infected ulcer R hip.  To continue IV vanco and Meropenem as recommended.      Vital Signs Last 24 Hrs  T(C): 36 (22 Mar 2021 08:00), Max: 36 (22 Mar 2021 08:00)  T(F): 96.8 (22 Mar 2021 08:00), Max: 96.8 (22 Mar 2021 08:00)  HR: 69 (22 Mar 2021 09:00) (59 - 87)  BP: 80/45 (22 Mar 2021 09:00) (80/45 - 105/55)  RR: 16 (22 Mar 2021 08:00) (16 - 18)    Chest CTA b/l.  Cor Reg S1S2.  Abd + BS, soft and NT.  R hip DU and L heel DU noted.                          8.7    6.38  )-----------( 555      ( 22 Mar 2021 07:15 )             28.4   03-22    140  |  101  |  7<L>  ----------------------------<  141<H>  4.3   |  32  |  0.5<L>    Ca    7.9<L>      22 Mar 2021 07:15  Mg     2.1     03-22    TPro  4.6<L>  /  Alb  2.0<L>  /  TBili  <0.2  /  DBili  x   /  AST  17  /  ALT  31  /  AlkPhos  62  03-22    Per Sub-I, wound cs from NH reported growth of predominately GNR in wound.

## 2021-03-22 NOTE — SWALLOW BEDSIDE ASSESSMENT ADULT - COMMENTS
Spoke w/ Janae SLP at [facility], who reported no known history of instrumental swallow assessment. Pt was at [facility] for short term rehab, was initially on ground w/ nectar thick liquids at baseline, however was then downgraded to puree 2' change clinical status.

## 2021-03-22 NOTE — SWALLOW BEDSIDE ASSESSMENT ADULT - ASR SWALLOW ASPIRATION MONITOR
change of breathing pattern/gurgly voice/pneumonia/throat clearing/upper respiratory infection
change of breathing pattern/gurgly voice/pneumonia/throat clearing/upper respiratory infection

## 2021-03-22 NOTE — SWALLOW BEDSIDE ASSESSMENT ADULT - SWALLOW EVAL: DIAGNOSIS
mild - mod oral dysphagia for puree and nectar thick liquids, multiple swallows noted; no overt s/s penetration/aspiration.

## 2021-03-22 NOTE — PROGRESS NOTE ADULT - ASSESSMENT
Infected DU R hip and new DU left foot and heel (not infected)-  Still with hypotensive episodes.  ID and burn service to f/up.  On Midodrine - to continue and observe.  Repeat labs - stable.  Will f/up LA level.  To Continue IV Meropenem and Vanco per ID.  Dose of Vanco was increased yesterday - to repeat trough level.    Per ID, no need to add Caspofungin 70 mg x 1.  Bl CS negative.  Fungitell result still pending.

## 2021-03-22 NOTE — PHYSICAL THERAPY INITIAL EVALUATION ADULT - SPECIFY REASON(S)
Unable to see pt as there is no activity order. Called resident #[id number] several times no answer. Will f/u as appropriate.

## 2021-03-22 NOTE — SWALLOW BEDSIDE ASSESSMENT ADULT - DIET PRIOR TO ADMI
pureiesha w/ nectar thick liquids, per RN at [facility]
pureiesha w/ nectar thick liquids, per RN at [facility]

## 2021-03-22 NOTE — SWALLOW BEDSIDE ASSESSMENT ADULT - SLP PERTINENT HISTORY OF CURRENT PROBLEM
56 y/o F presented from [facility] for hypotension and fevers. CXR 3/19/21 (right opacity, left retrocardiac opacity). Down syndrome, osteoporosis, neuropathy, mild anemia, and recent decubitus ulcer to right hip s/p debridement on 3/2 at NH. pt continues w/ hypotension, asymptomatic.

## 2021-03-22 NOTE — PROGRESS NOTE ADULT - SUBJECTIVE AND OBJECTIVE BOX
Patient is a 55y old  Female who presents with a chief complaint of Hypotension and fever (19 Mar 2021 20:55)      HPI:   56 yo f w/PMH of Down syndrome, osteoporosis, neuropathy, mild anemia, and recent decubitus ulcer to right hip s/p debridement on 3/2 at NH sent from [facility] for hypotension (66/44) and fever (unknown temperature). As per NH paper, patient was taking Vanco and danna for decubitus ulcer and recently developed more wounds to right foot and left foot/heel. Patient baseline non-verbal and unable to give history. Patient had a PICC on R and was scheduled to finish Vanc and Danna on 3/22. Last Vanc trough was 16.7 on 3/17.      OVERNIGHT EVENTS: Patient seen and examined at bedside. No acute overnight event. Patient does not appear to be in any acute distress, lying comfortably on bed.       REVIEW OF SYSTEMS:  Patient nonverbal. Could not assess.    FAMILY HISTORY:  No pertinent family history       Vital Signs Last 24 Hrs  T(C): 36 (22 Mar 2021 08:00), Max: 36 (22 Mar 2021 08:00)  T(F): 96.8 (22 Mar 2021 08:00), Max: 96.8 (22 Mar 2021 08:00)  HR: 69 (22 Mar 2021 09:00) (59 - 87)  BP: 80/45 (22 Mar 2021 09:00) (76/43 - 105/55)  RR: 16 (22 Mar 2021 08:00) (16 - 18)  SpO2: --      PHYSICAL EXAM:  GENERAL: NAD, nonverbal, not in acute distress, lying comfortably on bed  HEAD:  Atraumatic, Normocephalic  CHEST/LUNG: Clear to percussion bilaterally; No rales, rhonchi, wheezing, or rubs  HEART: RRR; No murmurs, rubs, or gallops  ABDOMEN: Soft, Nontender, Nondistended; Bowel sounds present  EXTREMITIES:   No clubbing, cyanosis, or edema  SKIN: Right hip full thickness wound about 5cm x 7cm and 2cm deep, with  lateral pink granulation and medial devitalized and black necrotic tissue, serosanguineous discharge, no acute bleeding, no pus draining.   Left medial  heel with 4cm x4cm ruptured blister revealing pink ulcer ; devitalized skin   pink/red, no edema, no erythema, no bleeding, no infection.   small wounds - dessicated  ulcers bilateral great toes     Consultant(s) Notes Reviewed:  [x ] YES  [ ] NO  Care Discussed with Consultants/Other Providers [ x] YES  [ ] NO        LABS:               8.7    6.38  )-----------( 555      ( 22 Mar 2021 07:15 )             28.4     03-22    140  |  101  |  7<L>  ----------------------------<  141<H>  4.3   |  32  |  0.5<L>    Ca    7.9<L>      22 Mar 2021 07:15  Mg     2.1     03-22    TPro  4.6<L>  /  Alb  2.0<L>  /  TBili  <0.2  /  DBili  x   /  AST  17  /  ALT  31  /  AlkPhos  62  03-22          RADIOLOGY & ADDITIONAL TESTS:      < from: Xray Chest 1 View- PORTABLE-Urgent (Xray Chest 1 View- PORTABLE-Urgent .) (03.19.21 @ 01:53) >  Impression:    Right base opacity and a left retrocardiac opacity. Findings may be infectious in nature. Clinical correlation is recommended. Follow to resolution is necessary to exclude underlying process    < end of copied text >      < from: Xray Foot AP + Lateral, Right (03.19.21 @ 02:59) >    Impression    Likely fracture of the tuft of the distal second phalanx.    Diffuse soft tissue swelling, especially along the dorsum of the foot      < end of copied text >        Imaging Personally Reviewed:  [ ] YES  [ ] NO  chlorhexidine 4% Liquid 1 Application(s) Topical <User Schedule>  cholecalciferol 2000 Unit(s) Oral daily  collagenase Ointment 1 Application(s) Topical two times a day  Dakins Solution - 1/2 Strength 1 Application(s) Topical two times a day  enoxaparin Injectable 40 milliGRAM(s) SubCutaneous daily  gabapentin 600 milliGRAM(s) Oral daily  melatonin 5 milliGRAM(s) Oral at bedtime  meropenem  IVPB 1000 milliGRAM(s) IV Intermittent every 24 hours  raloxifene 60 milliGRAM(s) Oral daily  vancomycin  IVPB 1000 milliGRAM(s) IV Intermittent every 12 hours    Assessment/Plan:    56 yo female hx of Down syndrome, osteoporosis, neuropathy, mild anemia, and recent decubitus ulcer to right hip s/p debridement on 3/2 at NH sent from [facility] for hypotension (66/44) and fever.    # Fevers/Hypotension- Multiple ulcers R hip  PNA vs ulcer vs UTI vs PICC line  -Chest xray shows R base opacity concerning for infection  -was on danna and vanc from 3/15 to be completed 3/22 via PICC line  -Last vanc trough 16.7 on 3/17  -PICC line removed  -WBC 6.38  -Stage 3 DU  on R hip  - Burn on board - possible bedside debridement, pending Burn recommendation today    - ID on board  - c/w wound care as per Burn  - c/w meropenem  IVPB 1000 milliGRAM(s) IV Intermittent every 24 hours  - rpt vanc trough before evening vanc dose; needs to be 10-15 as per ID   - c/w  IVPB 1250 milliGRAM(s) IV Intermittent every 12 hours  - Caspofungin 70 mg x 1 if unstable; not needed at this time   - F/u  esr, crp, procal  - F/u UA and Bcx  - F/u fungitell   - monitor BP, current 80/45   - c/w Midodrine 10 mg po TID.    # Anemia  -Hb downtrending 8.7  -As per NH Hb 10.7 on 3/15  -monitor CBC  -Keep active T&S  -F/u iron studies     # Osteoporosis  - continue with raloxifene  - continue with Vit D    # Neuropathy  - continue with gabapentin    # Transaminitis  AST/ALT 53/96  - Follow up RUQ US    Diet: Dysphagia 1 until S&S  Activity: AAT  DVT ppx: Lovenox  GI ppx: Not indicated  Code Status: Full  Disposition: From [facility]     Patient is a 55y old  Female who presents with a chief complaint of Hypotension and fever (19 Mar 2021 20:55)      HPI:   56 yo f w/PMH of Down syndrome, osteoporosis, neuropathy, mild anemia, and recent decubitus ulcer to right hip s/p debridement on 3/2 at NH sent from [facility] for hypotension (66/44) and fever (unknown temperature). As per NH paper, patient was taking Vanco and danna for decubitus ulcer and recently developed more wounds to right foot and left foot/heel. Patient baseline non-verbal and unable to give history. Patient had a PICC on R and was scheduled to finish Vanc and Danna on 3/22. Last Vanc trough was 16.7 on 3/17.      OVERNIGHT EVENTS: Hospital day 3. Patient seen and examined at bedside. No acute overnight event. Patient does not appear to be in any acute distress, lying comfortably on bed.       REVIEW OF SYSTEMS:  Patient nonverbal. Could not assess.    FAMILY HISTORY:  No pertinent family history       Vital Signs Last 24 Hrs  T(C): 36 (22 Mar 2021 08:00), Max: 36 (22 Mar 2021 08:00)  T(F): 96.8 (22 Mar 2021 08:00), Max: 96.8 (22 Mar 2021 08:00)  HR: 69 (22 Mar 2021 09:00) (59 - 87)  BP: 80/45 (22 Mar 2021 09:00) (76/43 - 105/55)  RR: 16 (22 Mar 2021 08:00) (16 - 18)  SpO2: --      PHYSICAL EXAM:  GENERAL: NAD, nonverbal, not in acute distress, lying comfortably on bed  HEAD:  Atraumatic, Normocephalic  CHEST/LUNG: Clear to percussion bilaterally; No rales, rhonchi, wheezing, or rubs  HEART: RRR; No murmurs, rubs, or gallops  ABDOMEN: Soft, Nontender, Nondistended; Bowel sounds present  EXTREMITIES:   No clubbing, cyanosis, or edema  SKIN: Right hip full thickness wound about 5cm x 7cm and 2cm deep, with  lateral pink granulation and medial devitalized and black necrotic tissue, serosanguineous discharge, no acute bleeding, no pus draining.   Left medial  heel with 4cm x4cm ruptured blister revealing pink ulcer ; devitalized skin   pink/red, no edema, no erythema, no bleeding, no infection.   small wounds - dessicated  ulcers bilateral great toes     Consultant(s) Notes Reviewed:  [x ] YES  [ ] NO  Care Discussed with Consultants/Other Providers [ x] YES  [ ] NO        LABS:               8.7    6.38  )-----------( 555      ( 22 Mar 2021 07:15 )             28.4     03-22    140  |  101  |  7<L>  ----------------------------<  141<H>  4.3   |  32  |  0.5<L>    Ca    7.9<L>      22 Mar 2021 07:15  Mg     2.1     03-22    TPro  4.6<L>  /  Alb  2.0<L>  /  TBili  <0.2  /  DBili  x   /  AST  17  /  ALT  31  /  AlkPhos  62  03-22          RADIOLOGY & ADDITIONAL TESTS:      < from: Xray Chest 1 View- PORTABLE-Urgent (Xray Chest 1 View- PORTABLE-Urgent .) (03.19.21 @ 01:53) >  Impression:    Right base opacity and a left retrocardiac opacity. Findings may be infectious in nature. Clinical correlation is recommended. Follow to resolution is necessary to exclude underlying process    < end of copied text >      < from: Xray Foot AP + Lateral, Right (03.19.21 @ 02:59) >    Impression    Likely fracture of the tuft of the distal second phalanx.    Diffuse soft tissue swelling, especially along the dorsum of the foot      < end of copied text >        Imaging Personally Reviewed:  [ ] YES  [ ] NO  chlorhexidine 4% Liquid 1 Application(s) Topical <User Schedule>  cholecalciferol 2000 Unit(s) Oral daily  collagenase Ointment 1 Application(s) Topical two times a day  Dakins Solution - 1/2 Strength 1 Application(s) Topical two times a day  enoxaparin Injectable 40 milliGRAM(s) SubCutaneous daily  gabapentin 600 milliGRAM(s) Oral daily  melatonin 5 milliGRAM(s) Oral at bedtime  meropenem  IVPB 1000 milliGRAM(s) IV Intermittent every 24 hours  raloxifene 60 milliGRAM(s) Oral daily  vancomycin  IVPB 1000 milliGRAM(s) IV Intermittent every 12 hours    Assessment/Plan:    56 yo female hx of Down syndrome, osteoporosis, neuropathy, mild anemia, and recent decubitus ulcer to right hip s/p debridement on 3/2 at NH sent from [facility] for hypotension (66/44) and fever.    # Fevers/Hypotension- Multiple ulcers R hip  PNA vs ulcer vs UTI vs PICC line  -Chest xray shows R base opacity concerning for infection  -was on danna and vanc from 3/15 to be completed 3/22 via PICC line  -Last vanc trough 16.7 on 3/17  -PICC line removed  -WBC 6.38  -Stage 3 DU  on R hip  - Burn on board - possible bedside debridement, pending Burn recommendation today    - ID on board  - c/w wound care as per Burn  - c/w meropenem  IVPB 1000 milliGRAM(s) IV Intermittent every 24 hours  - rpt vanc trough before evening vanc dose; needs to be 10-15 as per ID   - c/w  IVPB 1250 milliGRAM(s) IV Intermittent every 12 hours  - Caspofungin 70 mg x 1 if unstable;   - F/u  esr, crp, procal  -F/u wound culture  - F/u UA and Bcx  - F/u fungitell   - monitor BP, current 80/45   - c/w Midodrine 10 mg po TID.    # Anemia  -Hb downtrending 8.7  -As per NH Hb 10.7 on 3/15  -monitor CBC  -Keep active T&S  -F/u iron studies     # Osteoporosis  - continue with raloxifene  - continue with Vit D    # Neuropathy  - continue with gabapentin    # Transaminitis  AST/ALT 53/96  - Follow up RUQ US    Diet: Dysphagia 1 until S&S  Activity: AAT  DVT ppx: Lovenox  GI ppx: Not indicated  Code Status: Full  Disposition: From [facility]     Patient is a 55y old  Female who presents with a chief complaint of Hypotension and fever (19 Mar 2021 20:55)      HPI:   56 yo f w/PMH of Down syndrome, osteoporosis, neuropathy, mild anemia, and recent decubitus ulcer to right hip s/p debridement on 3/2 at NH sent from [facility] for hypotension (66/44) and fever (unknown temperature). As per NH paper, patient was taking Vanco and danna for decubitus ulcer and recently developed more wounds to right foot and left foot/heel. Patient baseline non-verbal and unable to give history. Patient had a PICC on R and was scheduled to finish Vanc and Danna on 3/22. Last Vanc trough was 16.7 on 3/17.      OVERNIGHT EVENTS: Hospital day 3. Patient seen and examined at bedside. No acute overnight event. Patient does not appear to be in any acute distress, lying comfortably on bed.       REVIEW OF SYSTEMS:  Patient nonverbal. Could not assess.    FAMILY HISTORY:  No pertinent family history       Vital Signs Last 24 Hrs  T(C): 36 (22 Mar 2021 08:00), Max: 36 (22 Mar 2021 08:00)  T(F): 96.8 (22 Mar 2021 08:00), Max: 96.8 (22 Mar 2021 08:00)  HR: 69 (22 Mar 2021 09:00) (59 - 87)  BP: 80/45 (22 Mar 2021 09:00) (76/43 - 105/55)  RR: 16 (22 Mar 2021 08:00) (16 - 18)  SpO2: --      PHYSICAL EXAM:  GENERAL: NAD, nonverbal, not in acute distress, lying comfortably on bed  HEAD:  Atraumatic, Normocephalic  CHEST/LUNG: Clear to percussion bilaterally; No rales, rhonchi, wheezing, or rubs  HEART: RRR; No murmurs, rubs, or gallops  ABDOMEN: Soft, Nontender, Nondistended; Bowel sounds present  EXTREMITIES:   No clubbing, cyanosis, or edema  SKIN: Right hip full thickness wound about 5cm x 7cm and 2cm deep, with  lateral pink granulation and medial devitalized and black necrotic tissue, serosanguineous discharge, no acute bleeding, no pus draining.   Left medial  heel with 4cm x4cm ruptured blister revealing pink ulcer ; devitalized skin   pink/red, no edema, no erythema, no bleeding, no infection.   small wounds - dessicated  ulcers bilateral great toes     Consultant(s) Notes Reviewed:  [x ] YES  [ ] NO  Care Discussed with Consultants/Other Providers [ x] YES  [ ] NO        LABS:               8.7    6.38  )-----------( 555      ( 22 Mar 2021 07:15 )             28.4     03-22    140  |  101  |  7<L>  ----------------------------<  141<H>  4.3   |  32  |  0.5<L>    Ca    7.9<L>      22 Mar 2021 07:15  Mg     2.1     03-22    TPro  4.6<L>  /  Alb  2.0<L>  /  TBili  <0.2  /  DBili  x   /  AST  17  /  ALT  31  /  AlkPhos  62  03-22          RADIOLOGY & ADDITIONAL TESTS:      < from: Xray Chest 1 View- PORTABLE-Urgent (Xray Chest 1 View- PORTABLE-Urgent .) (03.19.21 @ 01:53) >  Impression:    Right base opacity and a left retrocardiac opacity. Findings may be infectious in nature. Clinical correlation is recommended. Follow to resolution is necessary to exclude underlying process    < end of copied text >      < from: Xray Foot AP + Lateral, Right (03.19.21 @ 02:59) >    Impression    Likely fracture of the tuft of the distal second phalanx.    Diffuse soft tissue swelling, especially along the dorsum of the foot      < end of copied text >        Imaging Personally Reviewed:  [ ] YES  [ ] NO  chlorhexidine 4% Liquid 1 Application(s) Topical <User Schedule>  cholecalciferol 2000 Unit(s) Oral daily  collagenase Ointment 1 Application(s) Topical two times a day  Dakins Solution - 1/2 Strength 1 Application(s) Topical two times a day  enoxaparin Injectable 40 milliGRAM(s) SubCutaneous daily  gabapentin 600 milliGRAM(s) Oral daily  melatonin 5 milliGRAM(s) Oral at bedtime  meropenem  IVPB 1000 milliGRAM(s) IV Intermittent every 24 hours  raloxifene 60 milliGRAM(s) Oral daily  vancomycin  IVPB 1000 milliGRAM(s) IV Intermittent every 12 hours    Assessment/Plan:    56 yo female hx of Down syndrome, osteoporosis, neuropathy, mild anemia, and recent decubitus ulcer to right hip s/p debridement on 3/2 at NH sent from [facility] for hypotension (66/44) and fever.    # Fevers/Hypotension- Multiple ulcers R hip  PNA vs ulcer vs UTI vs PICC line  -Chest xray shows R base opacity concerning for infection  -was on danna and vanc from 3/15 to be completed 3/22 via PICC line  -Last vanc trough 16.7 on 3/17  -PICC line removed  -WBC 6.38  -Stage 3 DU  on R hip  - Burn on board - bedside debridement; tentatively scheduled today, pending consent.  - f/u ID   - c/w wound care as per Burn  - c/w meropenem  IVPB 1000 milliGRAM(s) IV Intermittent every 24 hours  - rpt vanc trough before evening vanc dose; needs to be 10-15 as per ID   - c/w  IVPB 1250 milliGRAM(s) IV Intermittent every 12 hours  - Caspofungin 70 mg x 1 if unstable;   - F/u  esr, crp, procal  -F/u wound culture  - F/u UA and Bcx  - F/u fungitell   - monitor BP, current 80/45   - c/w Midodrine 10 mg po TID.    # Anemia  -Hb downtrending 8.7  -As per NH Hb 10.7 on 3/15  -monitor CBC  -Keep active T&S  -F/u iron studies     # Osteoporosis  - continue with raloxifene  - continue with Vit D    # Neuropathy  - continue with gabapentin    # Transaminitis  AST/ALT 53/96  - Follow up RUQ US    Diet: Dysphagia 1 until S&S  Activity: AAT  DVT ppx: Lovenox  GI ppx: Not indicated  Code Status: Full  [facility]     Patient is a 55y old  Female who presents with a chief complaint of Hypotension and fever (19 Mar 2021 20:55)      HPI:   56 yo f w/PMH of Down syndrome, osteoporosis, neuropathy, mild anemia, and recent decubitus ulcer to right hip s/p debridement on 3/2 at NH sent from [facility] for hypotension (66/44) and fever (unknown temperature). As per NH paper, patient was taking Vanco and danna for decubitus ulcer and recently developed more wounds to right foot and left foot/heel. Patient baseline non-verbal and unable to give history. Patient had a PICC on R and was scheduled to finish Vanc and Danna on 3/22. Last Vanc trough was 16.7 on 3/17.      OVERNIGHT EVENTS: Hospital day 3. Patient seen and examined at bedside. No acute overnight event. Patient does not appear to be in any acute distress, lying comfortably on bed.       REVIEW OF SYSTEMS:  Patient nonverbal. Could not assess.    FAMILY HISTORY:  No pertinent family history       Vital Signs Last 24 Hrs  T(C): 36 (22 Mar 2021 08:00), Max: 36 (22 Mar 2021 08:00)  T(F): 96.8 (22 Mar 2021 08:00), Max: 96.8 (22 Mar 2021 08:00)  HR: 69 (22 Mar 2021 09:00) (59 - 87)  BP: 80/45 (22 Mar 2021 09:00) (76/43 - 105/55)  RR: 16 (22 Mar 2021 08:00) (16 - 18)  SpO2: --      PHYSICAL EXAM:  GENERAL: NAD, nonverbal, not in acute distress, lying comfortably on bed  HEAD:  Atraumatic, Normocephalic  CHEST/LUNG: Clear to percussion bilaterally; No rales, rhonchi, wheezing, or rubs  HEART: RRR; No murmurs, rubs, or gallops  ABDOMEN: Soft, Nontender, Nondistended; Bowel sounds present  EXTREMITIES:   No clubbing, cyanosis, or edema  SKIN: Right hip full thickness wound about 5cm x 7cm and 2cm deep, with  lateral pink granulation and medial devitalized and black necrotic tissue, serosanguineous discharge, no acute bleeding, no pus draining.   Left medial  heel with 4cm x4cm ruptured blister revealing pink ulcer ; devitalized skin   pink/red, no edema, no erythema, no bleeding, no infection.   small wounds - dessicated  ulcers bilateral great toes     Consultant(s) Notes Reviewed:  [x ] YES  [ ] NO  Care Discussed with Consultants/Other Providers [ x] YES  [ ] NO        LABS:               8.7    6.38  )-----------( 555      ( 22 Mar 2021 07:15 )             28.4     03-22    140  |  101  |  7<L>  ----------------------------<  141<H>  4.3   |  32  |  0.5<L>    Ca    7.9<L>      22 Mar 2021 07:15  Mg     2.1     03-22    TPro  4.6<L>  /  Alb  2.0<L>  /  TBili  <0.2  /  DBili  x   /  AST  17  /  ALT  31  /  AlkPhos  62  03-22          RADIOLOGY & ADDITIONAL TESTS:      < from: Xray Chest 1 View- PORTABLE-Urgent (Xray Chest 1 View- PORTABLE-Urgent .) (03.19.21 @ 01:53) >  Impression:    Right base opacity and a left retrocardiac opacity. Findings may be infectious in nature. Clinical correlation is recommended. Follow to resolution is necessary to exclude underlying process    < end of copied text >      < from: Xray Foot AP + Lateral, Right (03.19.21 @ 02:59) >    Impression    Likely fracture of the tuft of the distal second phalanx.    Diffuse soft tissue swelling, especially along the dorsum of the foot      < end of copied text >        Imaging Personally Reviewed:  [ ] YES  [ ] NO  chlorhexidine 4% Liquid 1 Application(s) Topical <User Schedule>  cholecalciferol 2000 Unit(s) Oral daily  collagenase Ointment 1 Application(s) Topical two times a day  Dakins Solution - 1/2 Strength 1 Application(s) Topical two times a day  enoxaparin Injectable 40 milliGRAM(s) SubCutaneous daily  gabapentin 600 milliGRAM(s) Oral daily  melatonin 5 milliGRAM(s) Oral at bedtime  meropenem  IVPB 1000 milliGRAM(s) IV Intermittent every 24 hours  raloxifene 60 milliGRAM(s) Oral daily  vancomycin  IVPB 1000 milliGRAM(s) IV Intermittent every 12 hours    Assessment/Plan:    56 yo female hx of Down syndrome, osteoporosis, neuropathy, mild anemia, and recent decubitus ulcer to right hip s/p debridement on 3/2 at NH sent from [facility] for hypotension (66/44) and fever.    # Fevers/Hypotension- Multiple ulcers R hip  PNA vs ulcer vs UTI vs PICC line  -Chest xray shows R base opacity concerning for infection  -was on danna and vanc from 3/15 to be completed 3/22 via PICC line  -Last vanc trough 16.7 on 3/17  -PICC line removed  -WBC 6.38  -Stage 3 DU  on R hip  - Burn on board - bedside debridement; tentatively scheduled today, pending consent.  - c/w wound care as per Burn  - c/w meropenem  IVPB 1000 milliGRAM(s) IV Intermittent every 24 hours  - rpt vanc trough before evening vanc dose; needs to be 10-15 as per ID   - c/w  IVPB 1250 milliGRAM(s) IV Intermittent every 12 hours   - F/u  esr, crp, procal  -F/u wound culture  - F/u UA and Bcx  - F/u fungitell   - f/u ID- rpt lactate, trend temp; Caspofungin 70 mg x 1 if unstable   - monitor BP, current 80/45   - c/w Midodrine 10 mg po TID.    # Anemia  -Hb downtrending 8.7  -As per NH Hb 10.7 on 3/15  -monitor CBC  -Keep active T&S  -F/u iron studies     # Osteoporosis  - continue with raloxifene  - continue with Vit D    # Neuropathy  - continue with gabapentin    # Transaminitis  AST/ALT 53/96  - Follow up RUQ US    Diet: Dysphagia 1 until S&S  Activity: AAT  DVT ppx: Lovenox  GI ppx: Not indicated  Code Status: Full  Disposition: From [facility]

## 2021-03-22 NOTE — SWALLOW BEDSIDE ASSESSMENT ADULT - SLP GENERAL OBSERVATIONS
Pt received asleep, arousable to verbal/tactile stim, able to sustain arousal for PO trials. +2L o2 NC, no verbal responses. Intermittently following commands.

## 2021-03-23 LAB — FUNGITELL: <31 PG/ML — SIGNIFICANT CHANGE UP

## 2021-03-23 PROCEDURE — 99232 SBSQ HOSP IP/OBS MODERATE 35: CPT

## 2021-03-23 PROCEDURE — 99222 1ST HOSP IP/OBS MODERATE 55: CPT

## 2021-03-23 PROCEDURE — 11043 DBRDMT MUSC&/FSCA 1ST 20/<: CPT

## 2021-03-23 PROCEDURE — 11046 DBRDMT MUSC&/FSCA EA ADDL: CPT

## 2021-03-23 RX ORDER — MORPHINE SULFATE 50 MG/1
4 CAPSULE, EXTENDED RELEASE ORAL ONCE
Refills: 0 | Status: DISCONTINUED | OUTPATIENT
Start: 2021-03-23 | End: 2021-03-23

## 2021-03-23 RX ORDER — SODIUM CHLORIDE 9 MG/ML
500 INJECTION INTRAMUSCULAR; INTRAVENOUS; SUBCUTANEOUS ONCE
Refills: 0 | Status: COMPLETED | OUTPATIENT
Start: 2021-03-23 | End: 2021-03-23

## 2021-03-23 RX ADMIN — SODIUM CHLORIDE 2000 MILLILITER(S): 9 INJECTION INTRAMUSCULAR; INTRAVENOUS; SUBCUTANEOUS at 20:34

## 2021-03-23 RX ADMIN — MIDODRINE HYDROCHLORIDE 10 MILLIGRAM(S): 2.5 TABLET ORAL at 17:02

## 2021-03-23 RX ADMIN — MORPHINE SULFATE 4 MILLIGRAM(S): 50 CAPSULE, EXTENDED RELEASE ORAL at 14:26

## 2021-03-23 RX ADMIN — GABAPENTIN 600 MILLIGRAM(S): 400 CAPSULE ORAL at 11:17

## 2021-03-23 RX ADMIN — Medication 1 APPLICATION(S): at 17:02

## 2021-03-23 RX ADMIN — MIDODRINE HYDROCHLORIDE 10 MILLIGRAM(S): 2.5 TABLET ORAL at 11:17

## 2021-03-23 RX ADMIN — Medication 1 APPLICATION(S): at 17:01

## 2021-03-23 RX ADMIN — MIDODRINE HYDROCHLORIDE 10 MILLIGRAM(S): 2.5 TABLET ORAL at 05:06

## 2021-03-23 RX ADMIN — MEROPENEM 100 MILLIGRAM(S): 1 INJECTION INTRAVENOUS at 16:24

## 2021-03-23 RX ADMIN — Medication 1 APPLICATION(S): at 05:06

## 2021-03-23 RX ADMIN — Medication 2000 UNIT(S): at 11:17

## 2021-03-23 RX ADMIN — RALOXIFENE HYDROCHLORIDE 60 MILLIGRAM(S): 60 TABLET, COATED ORAL at 11:17

## 2021-03-23 RX ADMIN — ENOXAPARIN SODIUM 40 MILLIGRAM(S): 100 INJECTION SUBCUTANEOUS at 11:17

## 2021-03-23 RX ADMIN — Medication 250 MILLIGRAM(S): at 05:06

## 2021-03-23 RX ADMIN — Medication 5 MILLIGRAM(S): at 21:03

## 2021-03-23 RX ADMIN — CHLORHEXIDINE GLUCONATE 1 APPLICATION(S): 213 SOLUTION TOPICAL at 06:57

## 2021-03-23 RX ADMIN — MORPHINE SULFATE 4 MILLIGRAM(S): 50 CAPSULE, EXTENDED RELEASE ORAL at 15:04

## 2021-03-23 NOTE — PHYSICAL THERAPY INITIAL EVALUATION ADULT - PERTINENT HX OF CURRENT PROBLEM, REHAB EVAL
56 yo female hx of Down syndrome, osteoporosis, neuropathy, mild anemia, and recent decubitus ulcer to right hip s/p debridement on 3/2 at NH sent from [facility] for hypotension (66/44) and fever. As per NH paper, patient was taking Vanco and danna for decubitus ulcer and recently developed more wounds to right foot and left foot/heel.

## 2021-03-23 NOTE — CONSULT NOTE ADULT - SUBJECTIVE AND OBJECTIVE BOX
PODIATRY CONSULT   TEA ECHAVARRIA is a 55y Female Patient is a 55y old  Female who presents with a chief complaint of Hypotension and fever (23 Mar 2021 11:20)    HPI:  54 yo female hx of Down syndrome, osteoporosis, neuropathy, mild anemia, and recent decubitus ulcer to right hip s/p debridement on 3/2 at NH sent from [facility] for hypotension (66/44) and fever (unknown temperature). As per NH paper, patient was taking Vanco and danna for decubitus ulcer and recently developed more wounds to right foot and left foot/heel. Patient baseline non-verbal and unable to give history. Called NH and was told that patient had a PICC on R and was supposed to finish Vanc and Danna on 3/22. Last Vanc trough was 16.7 on 3/17.      In the ED T 98.6  /50 RR 20 O2% 99 on 3L NC. 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 Labs were significant for WBC 7.27, Hb 10.2 AST/ALT 53/96. EKG normal. Covid negative. Chest xray shows R base opacity concerning for infection. R foot xray shows fracture of the tuft of the R distal phalanx, diffuse swelling along the dorsum of the foot. Vanc and danna was given. (19 Mar 2021 09:40)      DECUBITUS ULCERS;RIGHT FOOT ULCER    Neuropathy    Mild anemia    Osteoporosis    Down syndrome        PMH: DECUBITUS ULCERS;RIGHT FOOT ULCER    Neuropathy    Mild anemia    Osteoporosis    Down syndrome      PSH: S/P debridement    No significant past surgical history      Medication meropenem  IVPB 1000 milliGRAM(s) IV Intermittent every 24 hours  vancomycin  IVPB 750 milliGRAM(s) IV Intermittent every 12 hours    Allergy: No Known Allergies        Labs:                        8.7    6.38  )-----------( 555      ( 22 Mar 2021 07:15 )             28.4       03-22    140  |  101  |  7<L>  ----------------------------<  141<H>  4.3   |  32  |  0.5<L>    Ca    7.9<L>      22 Mar 2021 07:15  Mg     2.1     03-22    TPro  4.6<L>  /  Alb  2.0<L>  /  TBili  <0.2  /  DBili  x   /  AST  17  /  ALT  31  /  AlkPhos  62  03-22            ROS:  [ ]A ten point review of system was otherwise negative except as noted    Physical Exam - Right Lower Extremity Focused:   Derm:   Serous blister noted on the plantar aspect of the foot.   -Mild serous Drainage noted upon expression  Vascular:   DP and PT pulses are palpable with a 2/4  Cap re-fill time < than 3 sec to the digits. Skin temperature is warm to cool from proximal to distal.   Neuro:  - Protective sensation mildly  intact  MSK:   Manual Muscle strength +5/5 in all muscle compartments. B/L LE contracted    < from: Xray Foot AP + Lateral, Right (03.19.21 @ 02:59) >  EXAM:  XR FOOT 2 VIEWS RT            PROCEDURE DATE:  03/19/2021            INTERPRETATION:  Clinical History / Reason for exam: Status post wound    2 views of the right foot are provided for review. No prior studies are available for comparison.    There is diffuse soft tissue swelling involving the right foot, especially along the dorsum of the foot. There is likely a fracture of the tuft of the distal second phalanx. There are degenerative changes. There is hallux valgus deformity of the first digit.    Impression    Likely fracture of the tuft of the distal second phalanx.    Diffuse soft tissue swelling, especially along the dorsum of the foot                TOÑITO MORENO MD; Attending Radiologist  This document has been electronically signed. Mar 19 2021  5:52AM    < end of copied text >          Assessment:   Serous Blister, R. foot    Plan:  Chart reviewed and Patient evaluated  Discussed diagnosis and treatment with patient  Blister lanced with a #15 blade;serous drainage noted upon expression  Applied xeroform / DSD / Kerlix / Light Ace Bandage wrap  Cont. w/ LWC: As stated above,  Wound Care Orders: As stated above, Q24h  Offloading to bilateral LE w/ Pillows  X-rays reviewed : Stated above  Pt. Can WBAT w/  DARCO shoe for ambulation  Podiatry will follow while in house.  Discussed plan w/ Attending, Dr. Everett    Spectra: [phone]

## 2021-03-23 NOTE — PHYSICAL THERAPY INITIAL EVALUATION ADULT - TRANSFER TRAINING, PT EVAL
The patient will improve transfers to using  Min A rolling walker by discharge  to decrease burden of care.

## 2021-03-23 NOTE — PHYSICAL THERAPY INITIAL EVALUATION ADULT - GAIT TRAINING, PT EVAL
The patient will improve gait using  rolling walker with  Min A for 20 feet by discharge to decrease burden of care.

## 2021-03-23 NOTE — PROGRESS NOTE ADULT - SUBJECTIVE AND OBJECTIVE BOX
Chart Summary:  "56 yo f w/PMH of Down syndrome, osteoporosis, neuropathy, mild anemia, and recent decubitus ulcer to right hip s/p debridement on 3/2 at NH sent from [facility] for hypotension (66/44) and fever (unknown temperature). As per NH paper, patient was taking Vanco and danna for decubitus ulcer and recently developed more wounds to right foot and left foot/heel. Patient baseline non-verbal and unable to give history. Patient had a PICC on R and was scheduled to finish Vanc and Danna on 3/22. Last Vanc trough was 16.7 on 3/17. "    BP better today.  Pt alert, very awake and does not appear to be in distress.    Vital Signs Last 24 Hrs  T(C): 36 (23 Mar 2021 09:03), Max: 36 (23 Mar 2021 09:03)  T(F): 96.8 (23 Mar 2021 09:03), Max: 96.8 (23 Mar 2021 09:03)  HR: 88 (23 Mar 2021 09:03) (65 - 88)  BP: 115/54 (23 Mar 2021 09:03) (82/47 - 115/54)  RR: 18 (23 Mar 2021 09:03) (18 - 18)  SpO2: 98% (23 Mar 2021 09:03) (96% - 98%)    Chest CTA b/l.  Cor Reg S1S2.  Abd soft and NT.  S/p debridement of DUs per surgery today.

## 2021-03-23 NOTE — PHYSICAL THERAPY INITIAL EVALUATION ADULT - STRENGTHENING, PT EVAL
The patient will improve muscle strength to 1/2 grade to improve mobility.
0 = understands/communicates without difficulty

## 2021-03-23 NOTE — PROGRESS NOTE ADULT - ASSESSMENT
Stage 3 DU on R hip, unstagable right foot blister, stage 2 left foot ulcer, stage 3 left foot blister   - Burn on board - bedside debridement; consent received, debridement scheduled today 3/23/21  - WCx: >3 colony types, perdominantly gram negative rods  - c/w wound care as per Burn  - c/w meropenem  IVPB 1000 milliGRAM(s) IV Intermittent every 24 hours  - rpt vanc trough 25.5; needs to be 10-15 as per ID   -  vacomycin IVPB 750 milliGRAM(s) IV Intermittent every 12 hours    - Podiatry on board- rt. foot blister lanced and drained  -- F/u esr, crp, procal  - F/u UA and Bcx  - F/u fungitell   - ID- lactate 2.2, trend temp; Caspofungin 70 mg x 1 if unstable   - monitor BP, current 115/54  - c/w Midodrine 10 mg po TID  -encourage fluid intake PO

## 2021-03-23 NOTE — PHYSICAL THERAPY INITIAL EVALUATION ADULT - GENERAL OBSERVATIONS, REHAB EVAL
PT IE completed. [id number]. With PT Nurys, Patient encountered semifowler in bed, in NAD, +IV lock, O2 2L nc, +wound dressing on R hip, L foot, +blisters on whole plantar aspect of forefoot (no dresssing). Spoke to MD Babak toussaint MD requested for podiatry consult. REcommending NWB R LE until podiatry consult. Pt is non verbal, unable to follow commands but shows facial grimacing when in pain.

## 2021-03-23 NOTE — PROCEDURE NOTE - NSICDXPROCEDURE_GEN_ALL_CORE_FT
PROCEDURES:  Irrigation and debridement of right hip 23-Mar-2021 14:51:17  Vania Moses   PROCEDURES:  Excisional debridement of ulcer of left foot 23-Mar-2021 17:15:48 4 x 3 cm including dermis Long, Gabby  Irrigation and debridement of right hip 23-Mar-2021 14:51:17 stage 4 pressure ulcer 7x 5 cm Vania Moses

## 2021-03-23 NOTE — PROCEDURE NOTE - ADDITIONAL PROCEDURE DETAILS
right hip wound 7 x 5 cm lateral pink granulation tissue - medial aspect black necrotic fat and deeper brown fat necrosis with liquefaction , sl foul odor ; no gross purulence    left medial heel - open wound with blistered devitalized skin and yellow eschar

## 2021-03-23 NOTE — PROGRESS NOTE ADULT - SUBJECTIVE AND OBJECTIVE BOX
Patient is a 55y old  Female who presents with a chief complaint of Hypotension and fever (19 Mar 2021 20:55)      OVERNIGHT EVENTS: Hospital day 4. Patient seen and examined at bedside. No acute overnight event. Patient does not appear to be in any acute distress, lying comfortably on bed.       REVIEW OF SYSTEMS:  Patient nonverbal. Could not assess.    FAMILY HISTORY:  No pertinent family history       Vital Signs Last 24 Hrs  T(C): 36 (23 Mar 2021 09:03), Max: 36 (23 Mar 2021 09:03)  T(F): 96.8 (23 Mar 2021 09:03), Max: 96.8 (23 Mar 2021 09:03)  HR: 88 (23 Mar 2021 09:03) (65 - 88)  BP: 115/54 (23 Mar 2021 09:03) (82/47 - 115/54)  BP(mean): --  RR: 18 (23 Mar 2021 09:03) (18 - 18)  SpO2: 98% (23 Mar 2021 09:03) (96% - 98%)      PHYSICAL EXAM:  GENERAL: NAD, nonverbal, not in acute distress, lying comfortably on bed  HEAD:  Atraumatic, Normocephalic  CHEST/LUNG: Clear to percussion bilaterally; No rales, rhonchi, wheezing, or rubs  HEART: RRR; No murmurs, rubs, or gallops  ABDOMEN: Soft, Nontender, Nondistended; Bowel sounds present  EXTREMITIES:   No clubbing, cyanosis, or edema  SKIN: Right hip full thickness wound about 5cm x 7cm and 2cm deep, with  lateral pink granulation and medial devitalized and black necrotic tissue, serosanguineous discharge, no acute bleeding, no pus draining.   Left medial heel with 4cm x4cm ruptured blister revealing pink ulcer ; devitalized skin   pink/red, no edema, no erythema, no bleeding, no infection.   small wounds - desiccated  ulcers bilateral great toes     Consultant(s) Notes Reviewed:  [x ] YES  [ ] NO  Care Discussed with Consultants/Other Providers [ x] YES  [ ] NO    LABS:                        8.7    6.38  )-----------( 555      ( 22 Mar 2021 07:15 )             28.4     03-22    140  |  101  |  7<L>  ----------------------------<  141<H>  4.3   |  32  |  0.5<L>    Ca    7.9<L>      22 Mar 2021 07:15  Mg     2.1         TPro  4.6<L>  /  Alb  2.0<L>  /  TBili  <0.2  /  DBili  x   /  AST  17  /  ALT  31  /  AlkPhos  62                RADIOLOGY & ADDITIONAL TESTS:      < from: Xray Chest 1 View- PORTABLE-Urgent (Xray Chest 1 View- PORTABLE-Urgent .) (21 @ 01:53) >  Impression:    Right base opacity and a left retrocardiac opacity. Findings may be infectious in nature. Clinical correlation is recommended. Follow to resolution is necessary to exclude underlying process    < end of copied text >      < from: Xray Foot AP + Lateral, Right (21 @ 02:59) >    Impression    Likely fracture of the tuft of the distal second phalanx.    Diffuse soft tissue swelling, especially along the dorsum of the foot      < end of copied text >        Imaging Personally Reviewed:  [ ] YES  [ ] NO  chlorhexidine 4% Liquid 1 Application(s) Topical <User Schedule>  cholecalciferol 2000 Unit(s) Oral daily  collagenase Ointment 1 Application(s) Topical two times a day  Dakins Solution - 1/2 Strength 1 Application(s) Topical two times a day  enoxaparin Injectable 40 milliGRAM(s) SubCutaneous daily  gabapentin 600 milliGRAM(s) Oral daily  melatonin 5 milliGRAM(s) Oral at bedtime  meropenem  IVPB 1000 milliGRAM(s) IV Intermittent every 24 hours  raloxifene 60 milliGRAM(s) Oral daily  vancomycin  IVPB 1000 milliGRAM(s) IV Intermittent every 12 hours    Assessment/Plan:    54 yo female hx of Down syndrome, osteoporosis, neuropathy, mild anemia, and recent decubitus ulcer to right hip s/p debridement on 3/2 at NH sent from [facility] for hypotension (66/44) and fever.    # Fevers/Hypotension- Multiple ulcers R hip  PNA vs ulcer vs UTI vs PICC line  -Chest xray shows R base opacity concerning for infection  -was on danna and vanc from 3/15 to be completed 3/22 via PICC line  -Last vanc trough 16.7 on 3/17  -PICC line removed  -WBC 6.38  -Stage 3 DU on R hip, unstagable right foot blister, stage 2 left foot ulcer, stage 3 left foot blister   - Burn on board - bedside debridement; consent received, debridement scheduled today 3/23/21  - WCx: >3 colony types, perdominantly gram negative rods  - c/w wound care as per Burn  - c/w meropenem  IVPB 1000 milliGRAM(s) IV Intermittent every 24 hours  - rpt vanc trough 25.5; needs to be 10-15 as per ID   -  vacomycin IVPB 750 milliGRAM(s) IV Intermittent every 12 hours    - f/u Podiatry consult- rt. foot blister    - F/u esr, crp, procal  - F/u UA and Bcx  - F/u fungitell   - ID- lactate 2.2, trend temp; Caspofungin 70 mg x 1 if unstable   - monitor BP, current 115/54  - c/w Midodrine 10 mg po TID  -encourage fluid intake PO    # Anemia  -Hb downtrending 8.7  -As per NH Hb 10.7 on 3/15  -monitor CBC  -Keep active T&S  -F/u iron studies     # Osteoporosis  - continue with raloxifene  - continue with Vit D    # Neuropathy  - continue with gabapentin    # Transaminitis  AST/ALT 53/96  - Follow up RUQ US    Diet: Dysphagia 1 until S&S  Activity: AAT  DVT ppx: Lovenox  GI ppx: Not indicated  Code Status: Full  Disposition: From [facility]         Patient is a 55y old  Female who presents with a chief complaint of Hypotension and fever (19 Mar 2021 20:55)      OVERNIGHT EVENTS: Hospital day 4. Patient seen and examined at bedside. No acute overnight event. Patient does not appear to be in any acute distress, lying comfortably on bed.       REVIEW OF SYSTEMS:  Patient nonverbal. Could not assess.    FAMILY HISTORY:  No pertinent family history       Vital Signs Last 24 Hrs  T(C): 36 (23 Mar 2021 09:03), Max: 36 (23 Mar 2021 09:03)  T(F): 96.8 (23 Mar 2021 09:03), Max: 96.8 (23 Mar 2021 09:03)  HR: 88 (23 Mar 2021 09:03) (65 - 88)  BP: 115/54 (23 Mar 2021 09:03) (82/47 - 115/54)  BP(mean): --  RR: 18 (23 Mar 2021 09:03) (18 - 18)  SpO2: 98% (23 Mar 2021 09:03) (96% - 98%)      PHYSICAL EXAM:  GENERAL: NAD, nonverbal, not in acute distress, lying comfortably on bed  HEAD:  Atraumatic, Normocephalic  CHEST/LUNG: Clear to percussion bilaterally; No rales, rhonchi, wheezing, or rubs  HEART: RRR; No murmurs, rubs, or gallops  ABDOMEN: Soft, Nontender, Nondistended; Bowel sounds present  EXTREMITIES:   No clubbing, cyanosis, or edema  SKIN: Right hip full thickness wound about 5cm x 7cm and 2cm deep, with  lateral pink granulation and medial devitalized and black necrotic tissue, serosanguineous discharge, no acute bleeding, no pus draining.   Left medial heel with 4cm x4cm ruptured blister revealing pink ulcer ; devitalized skin   pink/red, no edema, no erythema, no bleeding, no infection.   small wounds - desiccated  ulcers bilateral great toes     Consultant(s) Notes Reviewed:  [x ] YES  [ ] NO  Care Discussed with Consultants/Other Providers [ x] YES  [ ] NO    LABS:                        8.7    6.38  )-----------( 555      ( 22 Mar 2021 07:15 )             28.4     03-22    140  |  101  |  7<L>  ----------------------------<  141<H>  4.3   |  32  |  0.5<L>    Ca    7.9<L>      22 Mar 2021 07:15  Mg     2.1         TPro  4.6<L>  /  Alb  2.0<L>  /  TBili  <0.2  /  DBili  x   /  AST  17  /  ALT  31  /  AlkPhos  62                RADIOLOGY & ADDITIONAL TESTS:      < from: Xray Chest 1 View- PORTABLE-Urgent (Xray Chest 1 View- PORTABLE-Urgent .) (21 @ 01:53) >  Impression:    Right base opacity and a left retrocardiac opacity. Findings may be infectious in nature. Clinical correlation is recommended. Follow to resolution is necessary to exclude underlying process    < end of copied text >      < from: Xray Foot AP + Lateral, Right (21 @ 02:59) >    Impression    Likely fracture of the tuft of the distal second phalanx.    Diffuse soft tissue swelling, especially along the dorsum of the foot      < end of copied text >        Imaging Personally Reviewed:  [ ] YES  [ ] NO  chlorhexidine 4% Liquid 1 Application(s) Topical <User Schedule>  cholecalciferol 2000 Unit(s) Oral daily  collagenase Ointment 1 Application(s) Topical two times a day  Dakins Solution - 1/2 Strength 1 Application(s) Topical two times a day  enoxaparin Injectable 40 milliGRAM(s) SubCutaneous daily  gabapentin 600 milliGRAM(s) Oral daily  melatonin 5 milliGRAM(s) Oral at bedtime  meropenem  IVPB 1000 milliGRAM(s) IV Intermittent every 24 hours  raloxifene 60 milliGRAM(s) Oral daily  vancomycin  IVPB 1000 milliGRAM(s) IV Intermittent every 12 hours    Assessment/Plan:    54 yo female hx of Down syndrome, osteoporosis, neuropathy, mild anemia, and recent decubitus ulcer to right hip s/p debridement on 3/2 at NH sent from [facility] for hypotension (66/44) and fever.    # Fevers/Hypotension- Multiple ulcers R hip  PNA vs ulcer vs UTI vs PICC line  -Chest xray shows R base opacity concerning for infection  -was on danna and vanc from 3/15 to be completed 3/22 via PICC line  -Last vanc trough 16.7 on 3/17  -PICC line removed  -WBC 6.38  -Stage 3 DU on R hip, unstagable right foot blister, stage 2 left foot ulcer, stage 3 left foot blister   - Burn on board - bedside debridement; consent received, debridement scheduled today 3/23/21  - WCx: >3 colony types, perdominantly gram negative rods  - c/w wound care as per Burn  - c/w meropenem  IVPB 1000 milliGRAM(s) IV Intermittent every 24 hours  - rpt vanc trough 25.5; needs to be 10-15 as per ID   -  vacomycin IVPB 750 milliGRAM(s) IV Intermittent every 12 hours    - Podiatry on board- rt. foot blister lanced and drained  - c/w wound care as per podiatry   - F/u esr, crp, procal  - F/u UA and Bcx  - F/u fungitell   - ID- lactate 2.2, trend temp; Caspofungin 70 mg x 1 if unstable   - monitor BP, current 115/54  - c/w Midodrine 10 mg po TID  -encourage fluid intake PO    # Anemia  -Hb downtrending 8.7  -As per NH Hb 10.7 on 3/15  -monitor CBC  -Keep active T&S  -F/u iron studies     # Osteoporosis  - continue with raloxifene  - continue with Vit D    # Neuropathy  - continue with gabapentin    # Transaminitis  AST/ALT 53/96  - Follow up RUQ US    Diet: Dysphagia 1 until S&S  Activity: WBAT w/ DARCO shoe   DVT ppx: Lovenox  GI ppx: Not indicated  Code Status: Full  Disposition: From [facility]

## 2021-03-24 LAB
-  AMIKACIN: SIGNIFICANT CHANGE UP
-  AZTREONAM: SIGNIFICANT CHANGE UP
-  CEFEPIME: SIGNIFICANT CHANGE UP
-  CEFTAZIDIME: SIGNIFICANT CHANGE UP
-  CIPROFLOXACIN: SIGNIFICANT CHANGE UP
-  GENTAMICIN: SIGNIFICANT CHANGE UP
-  IMIPENEM: SIGNIFICANT CHANGE UP
-  LEVOFLOXACIN: SIGNIFICANT CHANGE UP
-  MEROPENEM: SIGNIFICANT CHANGE UP
-  PIPERACILLIN/TAZOBACTAM: SIGNIFICANT CHANGE UP
-  TOBRAMYCIN: SIGNIFICANT CHANGE UP
ALBUMIN SERPL ELPH-MCNC: 2.1 G/DL — LOW (ref 3.5–5.2)
ALP SERPL-CCNC: 76 U/L — SIGNIFICANT CHANGE UP (ref 30–115)
ALT FLD-CCNC: 26 U/L — SIGNIFICANT CHANGE UP (ref 0–41)
ANION GAP SERPL CALC-SCNC: 9 MMOL/L — SIGNIFICANT CHANGE UP (ref 7–14)
AST SERPL-CCNC: 23 U/L — SIGNIFICANT CHANGE UP (ref 0–41)
BASOPHILS # BLD AUTO: 0.06 K/UL — SIGNIFICANT CHANGE UP (ref 0–0.2)
BASOPHILS NFR BLD AUTO: 1.1 % — HIGH (ref 0–1)
BILIRUB SERPL-MCNC: <0.2 MG/DL — SIGNIFICANT CHANGE UP (ref 0.2–1.2)
BUN SERPL-MCNC: 8 MG/DL — LOW (ref 10–20)
CALCIUM SERPL-MCNC: 8 MG/DL — LOW (ref 8.5–10.1)
CHLORIDE SERPL-SCNC: 101 MMOL/L — SIGNIFICANT CHANGE UP (ref 98–110)
CO2 SERPL-SCNC: 30 MMOL/L — SIGNIFICANT CHANGE UP (ref 17–32)
CREAT SERPL-MCNC: 0.5 MG/DL — LOW (ref 0.7–1.5)
CULTURE RESULTS: SIGNIFICANT CHANGE UP
EOSINOPHIL # BLD AUTO: 0.15 K/UL — SIGNIFICANT CHANGE UP (ref 0–0.7)
EOSINOPHIL NFR BLD AUTO: 2.7 % — SIGNIFICANT CHANGE UP (ref 0–8)
GLUCOSE SERPL-MCNC: 100 MG/DL — HIGH (ref 70–99)
HCT VFR BLD CALC: 31.8 % — LOW (ref 37–47)
HGB BLD-MCNC: 9.8 G/DL — LOW (ref 12–16)
IMM GRANULOCYTES NFR BLD AUTO: 0.4 % — HIGH (ref 0.1–0.3)
LYMPHOCYTES # BLD AUTO: 1.21 K/UL — SIGNIFICANT CHANGE UP (ref 1.2–3.4)
LYMPHOCYTES # BLD AUTO: 22.1 % — SIGNIFICANT CHANGE UP (ref 20.5–51.1)
MAGNESIUM SERPL-MCNC: 2.2 MG/DL — SIGNIFICANT CHANGE UP (ref 1.8–2.4)
MCHC RBC-ENTMCNC: 26.8 PG — LOW (ref 27–31)
MCHC RBC-ENTMCNC: 30.8 G/DL — LOW (ref 32–37)
MCV RBC AUTO: 86.9 FL — SIGNIFICANT CHANGE UP (ref 81–99)
METHOD TYPE: SIGNIFICANT CHANGE UP
MONOCYTES # BLD AUTO: 0.5 K/UL — SIGNIFICANT CHANGE UP (ref 0.1–0.6)
MONOCYTES NFR BLD AUTO: 9.1 % — SIGNIFICANT CHANGE UP (ref 1.7–9.3)
NEUTROPHILS # BLD AUTO: 3.53 K/UL — SIGNIFICANT CHANGE UP (ref 1.4–6.5)
NEUTROPHILS NFR BLD AUTO: 64.6 % — SIGNIFICANT CHANGE UP (ref 42.2–75.2)
NRBC # BLD: 0 /100 WBCS — SIGNIFICANT CHANGE UP (ref 0–0)
ORGANISM # SPEC MICROSCOPIC CNT: SIGNIFICANT CHANGE UP
ORGANISM # SPEC MICROSCOPIC CNT: SIGNIFICANT CHANGE UP
PLATELET # BLD AUTO: 601 K/UL — HIGH (ref 130–400)
POTASSIUM SERPL-MCNC: 4.4 MMOL/L — SIGNIFICANT CHANGE UP (ref 3.5–5)
POTASSIUM SERPL-SCNC: 4.4 MMOL/L — SIGNIFICANT CHANGE UP (ref 3.5–5)
PROT SERPL-MCNC: 4.9 G/DL — LOW (ref 6–8)
RBC # BLD: 3.66 M/UL — LOW (ref 4.2–5.4)
RBC # FLD: 15.7 % — HIGH (ref 11.5–14.5)
SODIUM SERPL-SCNC: 140 MMOL/L — SIGNIFICANT CHANGE UP (ref 135–146)
SPECIMEN SOURCE: SIGNIFICANT CHANGE UP
VANCOMYCIN TROUGH SERPL-MCNC: 13 UG/ML — HIGH (ref 5–10)
WBC # BLD: 5.47 K/UL — SIGNIFICANT CHANGE UP (ref 4.8–10.8)
WBC # FLD AUTO: 5.47 K/UL — SIGNIFICANT CHANGE UP (ref 4.8–10.8)

## 2021-03-24 PROCEDURE — 99231 SBSQ HOSP IP/OBS SF/LOW 25: CPT

## 2021-03-24 PROCEDURE — 99232 SBSQ HOSP IP/OBS MODERATE 35: CPT

## 2021-03-24 RX ORDER — ASCORBIC ACID 60 MG
500 TABLET,CHEWABLE ORAL DAILY
Refills: 0 | Status: DISCONTINUED | OUTPATIENT
Start: 2021-03-24 | End: 2021-03-26

## 2021-03-24 RX ORDER — MIDODRINE HYDROCHLORIDE 2.5 MG/1
5 TABLET ORAL ONCE
Refills: 0 | Status: COMPLETED | OUTPATIENT
Start: 2021-03-24 | End: 2021-03-24

## 2021-03-24 RX ADMIN — MEROPENEM 100 MILLIGRAM(S): 1 INJECTION INTRAVENOUS at 16:47

## 2021-03-24 RX ADMIN — Medication 2000 UNIT(S): at 12:32

## 2021-03-24 RX ADMIN — MIDODRINE HYDROCHLORIDE 10 MILLIGRAM(S): 2.5 TABLET ORAL at 12:32

## 2021-03-24 RX ADMIN — ENOXAPARIN SODIUM 40 MILLIGRAM(S): 100 INJECTION SUBCUTANEOUS at 12:32

## 2021-03-24 RX ADMIN — MIDODRINE HYDROCHLORIDE 5 MILLIGRAM(S): 2.5 TABLET ORAL at 00:53

## 2021-03-24 RX ADMIN — Medication 250 MILLIGRAM(S): at 05:06

## 2021-03-24 RX ADMIN — Medication 250 MILLIGRAM(S): at 19:00

## 2021-03-24 RX ADMIN — RALOXIFENE HYDROCHLORIDE 60 MILLIGRAM(S): 60 TABLET, COATED ORAL at 12:32

## 2021-03-24 RX ADMIN — Medication 5 MILLIGRAM(S): at 21:01

## 2021-03-24 RX ADMIN — GABAPENTIN 600 MILLIGRAM(S): 400 CAPSULE ORAL at 12:32

## 2021-03-24 RX ADMIN — MIDODRINE HYDROCHLORIDE 10 MILLIGRAM(S): 2.5 TABLET ORAL at 17:41

## 2021-03-24 RX ADMIN — CHLORHEXIDINE GLUCONATE 1 APPLICATION(S): 213 SOLUTION TOPICAL at 05:06

## 2021-03-24 RX ADMIN — MIDODRINE HYDROCHLORIDE 10 MILLIGRAM(S): 2.5 TABLET ORAL at 05:06

## 2021-03-24 NOTE — PROGRESS NOTE ADULT - SUBJECTIVE AND OBJECTIVE BOX
Chart Summary:  "54 yo f w/PMH of Down syndrome, osteoporosis, neuropathy, mild anemia, and recent decubitus ulcer to right hip s/p debridement on 3/2 at NH sent from [facility] for hypotension (66/44) and fever (unknown temperature). As per NH paper, patient was taking Vanco and danna for decubitus ulcer and recently developed more wounds to right foot and left foot/heel. Patient baseline non-verbal and unable to give history. Patient had a PICC on R and was scheduled to finish Vanc and Danna on 3/22. Last Vanc trough was 16.7 on 3/17. "    Pt alert, very awake and does not appear to be in distress.  Not in pain.    Vital Signs Last 24 Hrs  T(C): 36.6 (24 Mar 2021 07:40), Max: 37.2 (23 Mar 2021 15:44)  T(F): 97.9 (24 Mar 2021 07:40), Max: 99 (23 Mar 2021 15:44)  HR: 65 (24 Mar 2021 07:40) (65 - 83)  BP: 98/46 (24 Mar 2021 07:40) (74/37 - 112/49)  RR: 18 (24 Mar 2021 07:40) (18 - 19)      Chest CTA b/l.  Cor Reg S1S2.  Abd soft and NT.  S/p debridement of DUs per surgery yesterday.  Wound examination by burn service: "~[id number] wound to right hip.   Wound bed mostly pink and moist with ~[id number] area of adherent yellow necrosis at center of wound.   No purulent drainage or foul odor noted."                                     9.8    5.47  )-----------( 601      ( 24 Mar 2021 08:08 )             31.8    03-24    140  |  101  |  8<L>  ----------------------------<  100<H>  4.4   |  30  |  0.5<L>    Ca    8.0<L>      24 Mar 2021 08:08  Mg     2.2     03-24    TPro  4.9<L>  /  Alb  2.1<L>  /  TBili  <0.2  /  DBili  x   /  AST  23  /  ALT  26  /  AlkPhos  76  03-24

## 2021-03-24 NOTE — CHART NOTE - NSCHARTNOTEFT_GEN_A_CORE
Registered Dietitian Follow-Up     Patient Profile Reviewed                           Yes [x]   No []     Nutrition History Previously Obtained        Yes [x]  No []       Pertinent Subjective Information: Pt total feed, fair po. Eating >50% of her meals. Previous RD recommendations not implemented. Will continue to recommend to optimize po intake and optimize wound healing.      Pertinent Medical Interventions:  Fevers/Hypotension- Multiple ulcers R hip PNA vs ulcer vs UTI vs PICC line  PICC line removed   s/p debridement 3/23      Diet order: dysphagia 1 purred, honey      Anthropometrics:  - Ht. 58"  - Wt.  105lbs (3/18); dosing   - %wt change  - BMI 21.9 -- based on dosing   - IBW 95lbs     Pertinent Lab Data: (3/24) H/H 9.8/31.8, BUN 7, Cr 0.5,      Pertinent Meds: lovenox, abx, vit D     Physical Findings:  - Appearance: non verbal. No edema noted per RN flow sheets  - GI function: LBM 3/22  - Tubes: N/A  - Oral/Mouth cavity: s/s bedside eval (3/22) recommended dysphagia 1 (puree) w/ nectar thick liquids  - Skin: stage 3 R hip pressure injury, Left heel partial thickness wound- Stage 2 per BURN     Nutrition Requirements  Weight Used: 47.6kg CBW -- continued from initial RD assessment (3/21)      kcal: 1164-1261kcal (MSJ x 1.2-1.3AF)   protein: 47-57g (1-1.2g/kg CBW)   fluids: 1ml/kcal or per LIP     Nutrient Intake: >50%, pt total feed         [x] Previous Nutrition Diagnosis: Increased Nutrient Needs            [x] Ongoing          [] Resolved       Nutrition Intervention: meals and snacks, medical food supplements, vitamin/mineral supplementation      Goal/Expected Outcome: Pt to meet greater than 75% of estimated needs within 3 days      Indicator/Monitoring: energy intake, body composition, NFPF    Recommendations:  -Continue current diet order as tolerated. Defer diet consistency to s/s and medical team  -Add prosourc gelatin plus BID to optimize protein intake.   -If not contraindicated, order Multivitamin 500mg q24hrs and vit C 500mg q24hrs x 14   Discussed with sub-intern [phone]

## 2021-03-24 NOTE — PROGRESS NOTE ADULT - SUBJECTIVE AND OBJECTIVE BOX
Patient is a 55y old  Female who presents with a chief complaint of Hypotension and fever (19 Mar 2021 20:55)      OVERNIGHT EVENTS: Hospital day 5. Patient seen and examined at bedside. No acute overnight event. Patient does not appear to be in any acute distress, lying comfortably on bed.       REVIEW OF SYSTEMS:  Patient nonverbal. Could not assess.    FAMILY HISTORY:  No pertinent family history       Vital Signs Last 24 Hrs  T(C): 36.1 (24 Mar 2021 00:23), Max: 37.2 (23 Mar 2021 15:44)  T(F): 96.9 (24 Mar 2021 00:23), Max: 99 (23 Mar 2021 15:44)  HR: 67 (24 Mar 2021 04:51) (65 - 88)  BP: 112/49 (24 Mar 2021 04:51) (74/37 - 115/54)  BP(mean): --  RR: 19 (24 Mar 2021 00:23) (18 - 19)  SpO2: 98% (23 Mar 2021 09:03) (98% - 98%)      PHYSICAL EXAM:  GENERAL: NAD, nonverbal, not in acute distress, lying comfortably on bed  HEAD:  Atraumatic, Normocephalic  CHEST/LUNG: Clear to percussion bilaterally; No rales, rhonchi, wheezing, or rubs  HEART: RRR; No murmurs, rubs, or gallops  ABDOMEN: Soft, Nontender, Nondistended; Bowel sounds present  EXTREMITIES:  No clubbing, cyanosis, or edema  SKIN: Right hip full thickness wound about 5cm x 7cm and 2cm deep, with  lateral pink granulation and medial devitalized and black necrotic tissue, serosanguineous discharge, no acute bleeding, no pus draining.   Left medial heel with 4cm x4cm ruptured blister revealing pink ulcer ; devitalized skin   pink/red, no edema, no erythema, no bleeding, no infection.   small wounds - desiccated  ulcers bilateral great toes     Consultant(s) Notes Reviewed:  [x ] YES  [ ] NO  Care Discussed with Consultants/Other Providers [ x] YES  [ ] NO        LABS:                  RADIOLOGY & ADDITIONAL TESTS:      < from: Xray Chest 1 View- PORTABLE-Urgent (Xray Chest 1 View- PORTABLE-Urgent .) (21 @ 01:53) >  Impression:    Right base opacity and a left retrocardiac opacity. Findings may be infectious in nature. Clinical correlation is recommended. Follow to resolution is necessary to exclude underlying process    < end of copied text >      < from: Xray Foot AP + Lateral, Right (21 @ 02:59) >    Impression    Likely fracture of the tuft of the distal second phalanx.    Diffuse soft tissue swelling, especially along the dorsum of the foot      < end of copied text >        Imaging Personally Reviewed:  [ ] YES  [ ] NO  chlorhexidine 4% Liquid 1 Application(s) Topical <User Schedule>  cholecalciferol 2000 Unit(s) Oral daily  collagenase Ointment 1 Application(s) Topical two times a day  Dakins Solution - 1/2 Strength 1 Application(s) Topical two times a day  enoxaparin Injectable 40 milliGRAM(s) SubCutaneous daily  gabapentin 600 milliGRAM(s) Oral daily  melatonin 5 milliGRAM(s) Oral at bedtime  meropenem  IVPB 1000 milliGRAM(s) IV Intermittent every 24 hours  raloxifene 60 milliGRAM(s) Oral daily  vancomycin  IVPB 1000 milliGRAM(s) IV Intermittent every 12 hours    Assessment/Plan:    54 yo female hx of Down syndrome, osteoporosis, neuropathy, mild anemia, and recent decubitus ulcer to right hip s/p debridement on 3/2 at NH sent from [facility] for hypotension (66/44) and fever.    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 foot blister lanced and drained  - c/w wound care as per podiatry   - F/u esr, crp, procal  - F/u UA and Bcx  - fungitell negative  - ID- lactate 2.2, trend temp; Caspofungin 70 mg x 1 if unstable   - monitor BP, current 112/49  - c/w Midodrine 10 mg po TID  -encourage fluid intake PO    # Anemia  -Hb downtrending   -As per NH Hb 10.7 on 3/15  -monitor CBC  -Keep active T&S  -F/u iron studies     # Osteoporosis  - continue with raloxifene  - continue with Vit D    # Neuropathy  - continue with gabapentin    # Transaminitis (resolved)  AST/ALT       Diet: Dysphagia 1 until S&S  Activity: WBAT w/ DARCO shoe   DVT ppx: Lovenox  GI ppx: Not indicated  Code Status: Full  Disposition: From [facility]         Patient is a 55y old  Female who presents with a chief complaint of Hypotension and fever (19 Mar 2021 20:55)      OVERNIGHT EVENTS: Hospital day 5. Patient seen and examined at bedside. No acute overnight event. Patient does not appear to be in any acute distress, lying comfortably on bed.       REVIEW OF SYSTEMS:  Patient nonverbal. Could not assess.    FAMILY HISTORY:  No pertinent family history       Vital Signs Last 24 Hrs  T(C): 36.1 (24 Mar 2021 00:23), Max: 37.2 (23 Mar 2021 15:44)  T(F): 96.9 (24 Mar 2021 00:23), Max: 99 (23 Mar 2021 15:44)  HR: 67 (24 Mar 2021 04:51) (65 - 88)  BP: 112/49 (24 Mar 2021 04:51) (74/37 - 115/54)  BP(mean): --  RR: 19 (24 Mar 2021 00:23) (18 - 19)  SpO2: 98% (23 Mar 2021 09:03) (98% - 98%)      PHYSICAL EXAM:  GENERAL: NAD, nonverbal, not in acute distress, lying comfortably on bed  HEAD:  Atraumatic, Normocephalic  CHEST/LUNG: Clear to percussion bilaterally; No rales, rhonchi, wheezing, or rubs  HEART: RRR; No murmurs, rubs, or gallops  ABDOMEN: Soft, Nontender, Nondistended; Bowel sounds present  EXTREMITIES:  No clubbing, cyanosis, or edema  SKIN: Right hip full thickness wound about 5cm x 7cm and 2cm deep, with  lateral pink granulation and medial devitalized and black necrotic tissue, serosanguineous discharge, no acute bleeding, no pus draining.   Left medial heel with 4cm x4cm ruptured blister revealing pink ulcer ; devitalized skin   pink/red, no edema, no erythema, no bleeding, no infection.   small wounds - desiccated  ulcers bilateral great toes     Consultant(s) Notes Reviewed:  [x ] YES  [ ] NO  Care Discussed with Consultants/Other Providers [ x] YES  [ ] NO      LABS:                          9.8    5.47  )-----------( 601      ( 24 Mar 2021 08:08 )             31.8           RADIOLOGY & ADDITIONAL TESTS:      < from: Xray Chest 1 View- PORTABLE-Urgent (Xray Chest 1 View- PORTABLE-Urgent .) (03.19.21 @ 01:53) >  Impression:    Right base opacity and a left retrocardiac opacity. Findings may be infectious in nature. Clinical correlation is recommended. Follow to resolution is necessary to exclude underlying process    < end of copied text >      < from: Xray Foot AP + Lateral, Right (03.19.21 @ 02:59) >    Impression    Likely fracture of the tuft of the distal second phalanx.    Diffuse soft tissue swelling, especially along the dorsum of the foot      < end of copied text >        Imaging Personally Reviewed:  [ ] YES  [ ] NO  chlorhexidine 4% Liquid 1 Application(s) Topical <User Schedule>  cholecalciferol 2000 Unit(s) Oral daily  collagenase Ointment 1 Application(s) Topical two times a day  Dakins Solution - 1/2 Strength 1 Application(s) Topical two times a day  enoxaparin Injectable 40 milliGRAM(s) SubCutaneous daily  gabapentin 600 milliGRAM(s) Oral daily  melatonin 5 milliGRAM(s) Oral at bedtime  meropenem  IVPB 1000 milliGRAM(s) IV Intermittent every 24 hours  raloxifene 60 milliGRAM(s) Oral daily  vancomycin  IVPB 1000 milliGRAM(s) IV Intermittent every 12 hours    Assessment/Plan:    54 yo female hx of Down syndrome, osteoporosis, neuropathy, mild anemia, and recent decubitus ulcer to right hip s/p debridement on 3/2 at NH sent from [facility] for hypotension (66/44) and fever.    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 foot blister lanced and drained  - c/w wound care as per podiatry   - F/u esr, crp, procal  - F/u UA and Bcx  - fungitell negative  - ID- lactate 2.2, trend temp; Caspofungin 70 mg x 1 if unstable   - monitor BP, current 112/49  - c/w Midodrine 10 mg po TID  -encourage fluid intake PO    # Anemia  -Hb uptrending 9.8  -As per NH Hb 10.7 on 3/15  -monitor CBC  -Keep active T&S  -F/u iron studies     # Osteoporosis  - continue with raloxifene  - continue with Vit D    # Neuropathy  - continue with gabapentin    # Transaminitis (resolved)  AST/ALT 17/31      Diet: Dysphagia 1 until S&S  Activity: WBAT w/ DARCO shoe   DVT ppx: Lovenox  GI ppx: Not indicated  Code Status: Full  Disposition: From [facility]         Patient is a 55y old  Female who presents with a chief complaint of Hypotension and fever (19 Mar 2021 20:55)      OVERNIGHT EVENTS: Hospital day 5. Patient seen and examined at bedside. No acute overnight event. Patient does not appear to be in any acute distress, lying comfortably on bed.       REVIEW OF SYSTEMS:  Patient nonverbal. Could not assess.    FAMILY HISTORY:  No pertinent family history       Vital Signs Last 24 Hrs  T(C): 36.1 (24 Mar 2021 00:23), Max: 37.2 (23 Mar 2021 15:44)  T(F): 96.9 (24 Mar 2021 00:23), Max: 99 (23 Mar 2021 15:44)  HR: 67 (24 Mar 2021 04:51) (65 - 88)  BP: 112/49 (24 Mar 2021 04:51) (74/37 - 115/54)  BP(mean): --  RR: 19 (24 Mar 2021 00:23) (18 - 19)  SpO2: 98% (23 Mar 2021 09:03) (98% - 98%)      PHYSICAL EXAM:  GENERAL: NAD, nonverbal, not in acute distress, lying comfortably on bed  HEAD:  Atraumatic, Normocephalic  CHEST/LUNG: Clear to percussion bilaterally; No rales, rhonchi, wheezing, or rubs  HEART: RRR; No murmurs, rubs, or gallops  ABDOMEN: Soft, Nontender, Nondistended; Bowel sounds present  EXTREMITIES:  No clubbing, cyanosis, or edema  SKIN: Right hip full thickness wound about 5cm x 7cm and 2cm deep, with  lateral pink granulation and medial devitalized and black necrotic tissue, serosanguineous discharge, no acute bleeding, no pus draining.   Left medial heel with 4cm x4cm ruptured blister revealing pink ulcer ; devitalized skin   pink/red, no edema, no erythema, no bleeding, no infection.   small wounds - desiccated  ulcers bilateral great toes     Consultant(s) Notes Reviewed:  [x ] YES  [ ] NO  Care Discussed with Consultants/Other Providers [ x] YES  [ ] NO        LABS:                          9.8    5.47  )-----------( 601      ( 24 Mar 2021 08:08 )             31.8     03-24    140  |  101  |  8<L>  ----------------------------<  100<H>  4.4   |  30  |  0.5<L>    Ca    8.0<L>      24 Mar 2021 08:08  Mg     2.2     03-24    TPro  4.9<L>  /  Alb  2.1<L>  /  TBili  <0.2  /  DBili  x   /  AST  23  /  ALT  26  /  AlkPhos  76  03-24                RADIOLOGY & ADDITIONAL TESTS:      < from: Xray Chest 1 View- PORTABLE-Urgent (Xray Chest 1 View- PORTABLE-Urgent .) (03.19.21 @ 01:53) >  Impression:    Right base opacity and a left retrocardiac opacity. Findings may be infectious in nature. Clinical correlation is recommended. Follow to resolution is necessary to exclude underlying process    < end of copied text >      < from: Xray Foot AP + Lateral, Right (03.19.21 @ 02:59) >    Impression    Likely fracture of the tuft of the distal second phalanx.    Diffuse soft tissue swelling, especially along the dorsum of the foot      < end of copied text >        Imaging Personally Reviewed:  [ ] YES  [ ] NO    MEDICATIONS  (STANDING):  chlorhexidine 4% Liquid 1 Application(s) Topical <User Schedule>  cholecalciferol 2000 Unit(s) Oral daily  collagenase Ointment 1 Application(s) Topical two times a day  Dakins Solution - 1/2 Strength 1 Application(s) Topical two times a day  enoxaparin Injectable 40 milliGRAM(s) SubCutaneous daily  gabapentin 600 milliGRAM(s) Oral daily  lidocaine 1%/epinephrine 1:100,000 Inj 40 milliLiter(s) Local Injection once  melatonin 5 milliGRAM(s) Oral at bedtime  meropenem  IVPB 1000 milliGRAM(s) IV Intermittent every 24 hours  midodrine. 10 milliGRAM(s) Oral three times a day  raloxifene 60 milliGRAM(s) Oral daily  vancomycin  IVPB 750 milliGRAM(s) IV Intermittent every 12 hours      Assessment/Plan:    54 yo female hx of Down syndrome, osteoporosis, neuropathy, mild anemia, and recent decubitus ulcer to right hip s/p debridement on 3/2 at NH sent from [facility] for hypotension (66/44) and fever.    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 foot blister lanced and drained  - c/w wound care as per podiatry   - F/u esr, crp, procal  - F/u UA and Bcx  - fungitell negative  - ID- lactate 2.2, trend temp; Caspofungin 70 mg x 1 if unstable   - monitor BP, current 112/49  - c/w Midodrine 10 mg po TID  -encourage fluid intake PO    # Anemia  -Hb uptrending 9.8  -As per NH Hb 10.7 on 3/15  -monitor CBC  -Keep active T&S  -F/u iron studies     # Osteoporosis  - continue with raloxifene  - continue with Vit D    # Neuropathy  - continue with gabapentin    # Transaminitis (resolved)  AST/ALT 17/31      Diet: Dysphagia 1 until S&S  Activity: WBAT w/ DAR[employer] shoe   DVT ppx: Lovenox  GI ppx: Not indicated  Code Status: Full  Disposition: From [facility]         Patient is a 55y old  Female who presents with a chief complaint of Hypotension and fever (19 Mar 2021 20:55)      OVERNIGHT EVENTS: Hospital day 5. Patient seen and examined at bedside. No acute overnight event. Patient does not appear to be in any acute distress, lying comfortably on bed.       REVIEW OF SYSTEMS:  Patient nonverbal. Could not assess.    FAMILY HISTORY:  No pertinent family history       Vital Signs Last 24 Hrs  T(C): 36.1 (24 Mar 2021 00:23), Max: 37.2 (23 Mar 2021 15:44)  T(F): 96.9 (24 Mar 2021 00:23), Max: 99 (23 Mar 2021 15:44)  HR: 67 (24 Mar 2021 04:51) (65 - 88)  BP: 112/49 (24 Mar 2021 04:51) (74/37 - 115/54)  BP(mean): --  RR: 19 (24 Mar 2021 00:23) (18 - 19)  SpO2: 98% (23 Mar 2021 09:03) (98% - 98%)      PHYSICAL EXAM:  GENERAL: NAD, nonverbal, not in acute distress, lying comfortably on bed  HEAD:  Atraumatic, Normocephalic  CHEST/LUNG: Clear to percussion bilaterally; No rales, rhonchi, wheezing, or rubs  HEART: RRR; No murmurs, rubs, or gallops  ABDOMEN: Soft, Nontender, Nondistended; Bowel sounds present  EXTREMITIES:  No clubbing, cyanosis, or edema  SKIN: Right hip full thickness wound about 5cm x 7cm and 2cm deep, with  lateral pink granulation and medial devitalized and black necrotic tissue, serosanguineous discharge, no acute bleeding, no pus draining.   Left medial heel with 4cm x4cm ruptured blister revealing pink ulcer ; devitalized skin   pink/red, no edema, no erythema, no bleeding, no infection.   small wounds - desiccated  ulcers bilateral great toes     Consultant(s) Notes Reviewed:  [x ] YES  [ ] NO  Care Discussed with Consultants/Other Providers [ x] YES  [ ] NO        LABS:                          9.8    5.47  )-----------( 601      ( 24 Mar 2021 08:08 )             31.8     03-24    140  |  101  |  8<L>  ----------------------------<  100<H>  4.4   |  30  |  0.5<L>    Ca    8.0<L>      24 Mar 2021 08:08  Mg     2.2     03-24    TPro  4.9<L>  /  Alb  2.1<L>  /  TBili  <0.2  /  DBili  x   /  AST  23  /  ALT  26  /  AlkPhos  76  03-24                RADIOLOGY & ADDITIONAL TESTS:      < from: Xray Chest 1 View- PORTABLE-Urgent (Xray Chest 1 View- PORTABLE-Urgent .) (03.19.21 @ 01:53) >  Impression:    Right base opacity and a left retrocardiac opacity. Findings may be infectious in nature. Clinical correlation is recommended. Follow to resolution is necessary to exclude underlying process    < end of copied text >      < from: Xray Foot AP + Lateral, Right (03.19.21 @ 02:59) >    Impression    Likely fracture of the tuft of the distal second phalanx.    Diffuse soft tissue swelling, especially along the dorsum of the foot      < end of copied text >        Imaging Personally Reviewed:  [ ] YES  [ ] NO    MEDICATIONS  (STANDING):  chlorhexidine 4% Liquid 1 Application(s) Topical <User Schedule>  cholecalciferol 2000 Unit(s) Oral daily  collagenase Ointment 1 Application(s) Topical two times a day  Dakins Solution - 1/2 Strength 1 Application(s) Topical two times a day  enoxaparin Injectable 40 milliGRAM(s) SubCutaneous daily  gabapentin 600 milliGRAM(s) Oral daily  lidocaine 1%/epinephrine 1:100,000 Inj 40 milliLiter(s) Local Injection once  melatonin 5 milliGRAM(s) Oral at bedtime  meropenem  IVPB 1000 milliGRAM(s) IV Intermittent every 24 hours  midodrine. 10 milliGRAM(s) Oral three times a day  raloxifene 60 milliGRAM(s) Oral daily  vancomycin  IVPB 750 milliGRAM(s) IV Intermittent every 12 hours      Assessment/Plan:    54 yo female hx of Down syndrome, osteoporosis, neuropathy, mild anemia, and recent decubitus ulcer to right hip s/p debridement on 3/2 at NH sent from [facility] for hypotension (66/44) and fever.    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 foot blister lanced and drained  - c/w wound care as per podiatry   - F/u esr, crp, procal  - F/u UA and Bcx  - fungitell negative  - ID- lactate 2.2, trend temp; Caspofungin 70 mg x 1 if unstable   - monitor BP, current 112/49  - c/w Midodrine 10 mg po TID  -encourage fluid intake PO    # Anemia  -Hb uptrending 9.8  -As per NH Hb 10.7 on 3/15  -monitor CBC  -Keep active T&S  -F/u iron studies     # Osteoporosis  - continue with raloxifene  - continue with Vit D    # Neuropathy  - continue with gabapentin    # Transaminitis (resolved)  AST/ALT 17/31      Diet: Dysphagia 1 until S&S  Activity: WBAT w/ [employer] shoe   DVT ppx: Lovenox  GI ppx: Not indicated  Code Status: Full  Disposition: From [facility]

## 2021-03-24 NOTE — PROGRESS NOTE ADULT - SUBJECTIVE AND OBJECTIVE BOX
Patient is a 55y old  Female who presents with a chief complaint of Hypotension and fever. pt is POD #1 s/p debridement of R hip. pt evaluated at bedside.     INTERVAL HPI/OVERNIGHT EVENTS:  ICU Vital Signs Last 24 Hrs  T(C): 36.6 (24 Mar 2021 07:40), Max: 37.2 (23 Mar 2021 15:44)  T(F): 97.9 (24 Mar 2021 07:40), Max: 99 (23 Mar 2021 15:44)  HR: 65 (24 Mar 2021 07:40) (65 - 83)  BP: 98/46 (24 Mar 2021 07:40) (74/37 - 112/49)  RR: 18 (24 Mar 2021 07:40) (18 - 19)    MEDICATIONS  (STANDING):  chlorhexidine 4% Liquid 1 Application(s) Topical <User Schedule>  cholecalciferol 2000 Unit(s) Oral daily  collagenase Ointment 1 Application(s) Topical two times a day  Dakins Solution - 1/2 Strength 1 Application(s) Topical two times a day  enoxaparin Injectable 40 milliGRAM(s) SubCutaneous daily  gabapentin 600 milliGRAM(s) Oral daily  lidocaine 1%/epinephrine 1:100,000 Inj 40 milliLiter(s) Local Injection once  melatonin 5 milliGRAM(s) Oral at bedtime  meropenem  IVPB 1000 milliGRAM(s) IV Intermittent every 24 hours  midodrine. 10 milliGRAM(s) Oral three times a day  raloxifene 60 milliGRAM(s) Oral daily  vancomycin  IVPB 750 milliGRAM(s) IV Intermittent every 12 hours      PHYSICAL EXAM:  GENERAL: well built, well nourished  HEAD:  Atraumatic, Normocephalic  Wound: ~[id number] wound to right hip. Wound bed mostly pink and moist with ~[id number] area of adherent yellow necrosis at center of wound. no purulent drainage or foul odor noted.

## 2021-03-24 NOTE — PROGRESS NOTE ADULT - ASSESSMENT
56 y/o female who presents with R hip ulcer   -POD #1 s/p debridement of right hip   - Plan for OR Friday for more debridement   - LWC: santyl/ Dakins WTD.

## 2021-03-24 NOTE — PROGRESS NOTE ADULT - ASSESSMENT
Unstagable DU on R hip, unstagable right foot blister, stage 2 left foot ulcer, stage 3 left foot blister   appreciate Burn f/up - scheduled for further debridement in OR this Friday.     - c/w meropenem  IVPB 1000 milliGRAM(s) IV Intermittent every 24 hours  - rpt vanc trough 25.5; needs to be 10-15 as per ID   -  vacomycin IVPB 750 milliGRAM(s) IV Intermittent every 12 hours    - Podiatry on board- rt. foot blister lanced and drained  - F/u fungitell     Nutrition  Recommendations:  "-Continue current diet order as tolerated. Defer diet consistency to s/s and medical team  -Add prosourc gelatin plus BID to optimize protein intake.   -If not contraindicated, order Multivitamin 500mg q24hrs and vit C 500mg q24hrs x 14"    Unstagable DU on R hip, unstagable right foot blister, stage 2 left foot ulcer, stage 3 left foot blister   Appreciate Burn f/up - scheduled for further debridement in OR this Friday.     - c/w meropenem  IVPB 1000 milliGRAM(s) IV Intermittent every 24 hours  - rpt vanc trough 25.5; needs to be 10-15 as per ID   -  vacomycin IVPB 750 milliGRAM(s) IV Intermittent every 12 hours    - Podiatry on board- rt. foot blister lanced and drained  - F/u fungitell     Nutrition  Recommendations:  "-Continue current diet order as tolerated. Defer diet consistency to s/s and medical team  -Add prosourc gelatin plus BID to optimize protein intake.   -If not contraindicated, order Multivitamin 500mg q24hrs and vit C 500mg q24hrs x 14"

## 2021-03-25 LAB
ALBUMIN SERPL ELPH-MCNC: 2.3 G/DL — LOW (ref 3.5–5.2)
ALP SERPL-CCNC: 78 U/L — SIGNIFICANT CHANGE UP (ref 30–115)
ALT FLD-CCNC: 26 U/L — SIGNIFICANT CHANGE UP (ref 0–41)
ANION GAP SERPL CALC-SCNC: 11 MMOL/L — SIGNIFICANT CHANGE UP (ref 7–14)
AST SERPL-CCNC: 25 U/L — SIGNIFICANT CHANGE UP (ref 0–41)
BASOPHILS # BLD AUTO: 0.08 K/UL — SIGNIFICANT CHANGE UP (ref 0–0.2)
BASOPHILS NFR BLD AUTO: 1.2 % — HIGH (ref 0–1)
BILIRUB SERPL-MCNC: <0.2 MG/DL — SIGNIFICANT CHANGE UP (ref 0.2–1.2)
BLD GP AB SCN SERPL QL: SIGNIFICANT CHANGE UP
BLD GP AB SCN SERPL QL: SIGNIFICANT CHANGE UP
BUN SERPL-MCNC: 8 MG/DL — LOW (ref 10–20)
CALCIUM SERPL-MCNC: 8.3 MG/DL — LOW (ref 8.5–10.1)
CHLORIDE SERPL-SCNC: 101 MMOL/L — SIGNIFICANT CHANGE UP (ref 98–110)
CO2 SERPL-SCNC: 27 MMOL/L — SIGNIFICANT CHANGE UP (ref 17–32)
CREAT SERPL-MCNC: 0.7 MG/DL — SIGNIFICANT CHANGE UP (ref 0.7–1.5)
CULTURE RESULTS: SIGNIFICANT CHANGE UP
EOSINOPHIL # BLD AUTO: 0.13 K/UL — SIGNIFICANT CHANGE UP (ref 0–0.7)
EOSINOPHIL NFR BLD AUTO: 2 % — SIGNIFICANT CHANGE UP (ref 0–8)
GLUCOSE SERPL-MCNC: 118 MG/DL — HIGH (ref 70–99)
HCT VFR BLD CALC: 34 % — LOW (ref 37–47)
HGB BLD-MCNC: 10.7 G/DL — LOW (ref 12–16)
IMM GRANULOCYTES NFR BLD AUTO: 0.3 % — SIGNIFICANT CHANGE UP (ref 0.1–0.3)
LYMPHOCYTES # BLD AUTO: 1.32 K/UL — SIGNIFICANT CHANGE UP (ref 1.2–3.4)
LYMPHOCYTES # BLD AUTO: 20.4 % — LOW (ref 20.5–51.1)
MAGNESIUM SERPL-MCNC: 2.2 MG/DL — SIGNIFICANT CHANGE UP (ref 1.8–2.4)
MCHC RBC-ENTMCNC: 27.2 PG — SIGNIFICANT CHANGE UP (ref 27–31)
MCHC RBC-ENTMCNC: 31.5 G/DL — LOW (ref 32–37)
MCV RBC AUTO: 86.5 FL — SIGNIFICANT CHANGE UP (ref 81–99)
MONOCYTES # BLD AUTO: 0.5 K/UL — SIGNIFICANT CHANGE UP (ref 0.1–0.6)
MONOCYTES NFR BLD AUTO: 7.7 % — SIGNIFICANT CHANGE UP (ref 1.7–9.3)
NEUTROPHILS # BLD AUTO: 4.42 K/UL — SIGNIFICANT CHANGE UP (ref 1.4–6.5)
NEUTROPHILS NFR BLD AUTO: 68.4 % — SIGNIFICANT CHANGE UP (ref 42.2–75.2)
NRBC # BLD: 0 /100 WBCS — SIGNIFICANT CHANGE UP (ref 0–0)
PLATELET # BLD AUTO: 553 K/UL — HIGH (ref 130–400)
POTASSIUM SERPL-MCNC: 4.3 MMOL/L — SIGNIFICANT CHANGE UP (ref 3.5–5)
POTASSIUM SERPL-SCNC: 4.3 MMOL/L — SIGNIFICANT CHANGE UP (ref 3.5–5)
PROT SERPL-MCNC: 5.6 G/DL — LOW (ref 6–8)
RBC # BLD: 3.93 M/UL — LOW (ref 4.2–5.4)
RBC # FLD: 15.9 % — HIGH (ref 11.5–14.5)
SODIUM SERPL-SCNC: 139 MMOL/L — SIGNIFICANT CHANGE UP (ref 135–146)
SPECIMEN SOURCE: SIGNIFICANT CHANGE UP
WBC # BLD: 6.47 K/UL — SIGNIFICANT CHANGE UP (ref 4.8–10.8)
WBC # FLD AUTO: 6.47 K/UL — SIGNIFICANT CHANGE UP (ref 4.8–10.8)

## 2021-03-25 PROCEDURE — 99232 SBSQ HOSP IP/OBS MODERATE 35: CPT

## 2021-03-25 RX ORDER — CEFEPIME 1 G/1
1000 INJECTION, POWDER, FOR SOLUTION INTRAMUSCULAR; INTRAVENOUS ONCE
Refills: 0 | Status: COMPLETED | OUTPATIENT
Start: 2021-03-25 | End: 2021-03-25

## 2021-03-25 RX ORDER — ACETAMINOPHEN 500 MG
650 TABLET ORAL ONCE
Refills: 0 | Status: COMPLETED | OUTPATIENT
Start: 2021-03-25 | End: 2021-03-25

## 2021-03-25 RX ORDER — NYSTATIN CREAM 100000 [USP'U]/G
1 CREAM TOPICAL
Refills: 0 | Status: DISCONTINUED | OUTPATIENT
Start: 2021-03-25 | End: 2021-03-26

## 2021-03-25 RX ORDER — METRONIDAZOLE 500 MG
500 TABLET ORAL EVERY 8 HOURS
Refills: 0 | Status: DISCONTINUED | OUTPATIENT
Start: 2021-03-25 | End: 2021-03-26

## 2021-03-25 RX ORDER — CEFEPIME 1 G/1
INJECTION, POWDER, FOR SOLUTION INTRAMUSCULAR; INTRAVENOUS
Refills: 0 | Status: DISCONTINUED | OUTPATIENT
Start: 2021-03-25 | End: 2021-03-26

## 2021-03-25 RX ORDER — CEFEPIME 1 G/1
1000 INJECTION, POWDER, FOR SOLUTION INTRAMUSCULAR; INTRAVENOUS EVERY 8 HOURS
Refills: 0 | Status: DISCONTINUED | OUTPATIENT
Start: 2021-03-25 | End: 2021-03-26

## 2021-03-25 RX ADMIN — MIDODRINE HYDROCHLORIDE 10 MILLIGRAM(S): 2.5 TABLET ORAL at 17:16

## 2021-03-25 RX ADMIN — CEFEPIME 100 MILLIGRAM(S): 1 INJECTION, POWDER, FOR SOLUTION INTRAMUSCULAR; INTRAVENOUS at 21:43

## 2021-03-25 RX ADMIN — CHLORHEXIDINE GLUCONATE 1 APPLICATION(S): 213 SOLUTION TOPICAL at 05:19

## 2021-03-25 RX ADMIN — NYSTATIN CREAM 1 APPLICATION(S): 100000 CREAM TOPICAL at 17:15

## 2021-03-25 RX ADMIN — MEROPENEM 100 MILLIGRAM(S): 1 INJECTION INTRAVENOUS at 16:11

## 2021-03-25 RX ADMIN — Medication 500 MILLIGRAM(S): at 21:44

## 2021-03-25 RX ADMIN — Medication 1 APPLICATION(S): at 05:12

## 2021-03-25 RX ADMIN — ENOXAPARIN SODIUM 40 MILLIGRAM(S): 100 INJECTION SUBCUTANEOUS at 11:50

## 2021-03-25 RX ADMIN — Medication 650 MILLIGRAM(S): at 16:50

## 2021-03-25 RX ADMIN — GABAPENTIN 600 MILLIGRAM(S): 400 CAPSULE ORAL at 11:50

## 2021-03-25 RX ADMIN — MIDODRINE HYDROCHLORIDE 10 MILLIGRAM(S): 2.5 TABLET ORAL at 05:12

## 2021-03-25 RX ADMIN — Medication 1 APPLICATION(S): at 17:15

## 2021-03-25 RX ADMIN — NYSTATIN CREAM 1 APPLICATION(S): 100000 CREAM TOPICAL at 05:12

## 2021-03-25 RX ADMIN — Medication 2000 UNIT(S): at 11:50

## 2021-03-25 RX ADMIN — Medication 5 MILLIGRAM(S): at 21:45

## 2021-03-25 RX ADMIN — CEFEPIME 100 MILLIGRAM(S): 1 INJECTION, POWDER, FOR SOLUTION INTRAMUSCULAR; INTRAVENOUS at 17:28

## 2021-03-25 RX ADMIN — MIDODRINE HYDROCHLORIDE 10 MILLIGRAM(S): 2.5 TABLET ORAL at 11:50

## 2021-03-25 RX ADMIN — RALOXIFENE HYDROCHLORIDE 60 MILLIGRAM(S): 60 TABLET, COATED ORAL at 11:50

## 2021-03-25 RX ADMIN — Medication 500 MILLIGRAM(S): at 11:50

## 2021-03-25 RX ADMIN — Medication 1 TABLET(S): at 11:50

## 2021-03-25 RX ADMIN — Medication 250 MILLIGRAM(S): at 05:11

## 2021-03-25 NOTE — PROGRESS NOTE ADULT - SUBJECTIVE AND OBJECTIVE BOX
JERICHO TEA  55y, Female  Allergy: No Known Allergies      LOS  6d    CHIEF COMPLAINT: Hypotension and fever (25 Mar 2021 08:38)      INTERVAL EVENTS/HPI  - No acute events overnight  - T(F): , Max: 98.1 (03-24-21 @ 16:00)  - Denies any worsening symptoms  - Tolerating medication  - WBC Count: 6.47 (03-25-21 @ 11:37)  WBC Count: 5.47 (03-24-21 @ 08:08)     - Creatinine, Serum: 0.7 (03-25-21 @ 11:37)  Creatinine, Serum: 0.5 (03-24-21 @ 08:08)       ROS  unable to obtain history secondary to patient's mental status    VITALS:  T(F): 96.9, Max: 98.1 (03-24-21 @ 16:00)  HR: 97  BP: 113/81  RR: 18Vital Signs Last 24 Hrs  T(C): 36.1 (25 Mar 2021 07:45), Max: 36.7 (24 Mar 2021 16:00)  T(F): 96.9 (25 Mar 2021 07:45), Max: 98.1 (24 Mar 2021 16:00)  HR: 97 (25 Mar 2021 07:45) (72 - 97)  BP: 113/81 (25 Mar 2021 07:45) (90/52 - 113/81)  BP(mean): --  RR: 18 (25 Mar 2021 07:45) (18 - 18)  SpO2: 98% (25 Mar 2021 07:45) (97% - 98%)    PHYSICAL EXAM:  Gen: NAD, resting in bed  HEENT: Normocephalic, atraumatic  Neck: supple, no lymphadenopathy  CV: Regular rate & regular rhythm  Lungs: decreased BS at bases, no fremitus  Abdomen: Soft, BS present  Ext: Warm, well perfused  Neuro: non focal, awake  Skin: right hip wpund dressed  Lines: no phlebitis    FH: Non-contributory  Social Hx: Non-contributory    TESTS & MEASUREMENTS:                        10.7   6.47  )-----------( 553      ( 25 Mar 2021 11:37 )             34.0     03-25    139  |  101  |  8<L>  ----------------------------<  118<H>  4.3   |  27  |  0.7    Ca    8.3<L>      25 Mar 2021 11:37  Mg     2.2     03-25    TPro  5.6<L>  /  Alb  2.3<L>  /  TBili  <0.2  /  DBili  x   /  AST  25  /  ALT  26  /  AlkPhos  78  03-25    eGFR if Non African American: 98 mL/min/1.73M2 (03-25-21 @ 11:37)  eGFR if : 114 mL/min/1.73M2 (03-25-21 @ 11:37)    LIVER FUNCTIONS - ( 25 Mar 2021 11:37 )  Alb: 2.3 g/dL / Pro: 5.6 g/dL / ALK PHOS: 78 U/L / ALT: 26 U/L / AST: 25 U/L / GGT: x               Culture - Blood (collected 03-20-21 @ 06:05)  Source: .Blood None  Preliminary Report (03-21-21 @ 18:01):    No growth to date.    Culture - Other (collected 03-19-21 @ 02:30)  Source: .Other right hip wound  Final Report (03-24-21 @ 16:56):    Culture yields growth of greater than 3 colony types of    bacteria,  which may indicate contamination and normal daniele    Call client services within 7 days if further workup is clinically    indicated. Culture includes    Moderate Pseudomonas aeruginosa (Carbapenem Resistant)  Organism: Pseudomonas aeruginosa (Carbapenem Resistant) (03-24-21 @ 16:56)  Organism: Pseudomonas aeruginosa (Carbapenem Resistant) (03-24-21 @ 16:56)      -  Amikacin: S <=16      -  Aztreonam: S <=4      -  Cefepime: S <=2      -  Ceftazidime: S <=1      -  Ciprofloxacin: S <=0.25      -  Gentamicin: S <=2      -  Imipenem: R >8      -  Levofloxacin: S <=0.5      -  Meropenem: R 8      -  Piperacillin/Tazobactam: S <=8      -  Tobramycin: S <=2      Method Type: ZANE    Culture - Blood (collected 03-19-21 @ 02:30)  Source: .Blood Blood  Final Report (03-24-21 @ 14:00):    No Growth Final    Culture - Blood (collected 03-19-21 @ 02:30)  Source: .Blood Blood  Final Report (03-24-21 @ 14:00):    No Growth Final        Lactate, Blood: 2.2 mmol/L (03-22-21 @ 17:35)      INFECTIOUS DISEASES TESTING  Fungitell: <31 (03-20-21 @ 06:05)  MRSA PCR Result.: Negative (03-19-21 @ 14:00)  COVID-19 PCR: NotDetec (03-19-21 @ 02:30)      INFLAMMATORY MARKERS      RADIOLOGY & ADDITIONAL TESTS:  I have personally reviewed the last available Chest xray  CXR      CT      CARDIOLOGY TESTING  12 Lead ECG:   Ventricular Rate 94 BPM    Atrial Rate 94 BPM    P-R Interval 140 ms    QRS Duration 60 ms    Q-T Interval 348 ms    QTC Calculation(Bazett) 435 ms    P Axis 51 degrees    R Axis 46 degrees    T Axis 54 degrees    Diagnosis Line Normal sinus rhythm  Normal ECG    Confirmed by ROSA RENEE MD ([id number]) on 3/19/2021 6:38:52 AM (03-19-21 @ 02:22)      MEDICATIONS  ascorbic acid 500 Oral daily  chlorhexidine 4% Liquid 1 Topical <User Schedule>  cholecalciferol 2000 Oral daily  collagenase Ointment 1 Topical two times a day  Dakins Solution - 1/2 Strength 1 Topical two times a day  enoxaparin Injectable 40 SubCutaneous daily  gabapentin 600 Oral daily  lidocaine 1%/epinephrine 1:100,000 Inj 40 Local Injection once  melatonin 5 Oral at bedtime  meropenem  IVPB 1000 IV Intermittent every 24 hours  midodrine. 10 Oral three times a day  multivitamin 1 Oral daily  nystatin Cream 1 Topical two times a day  raloxifene 60 Oral daily      WEIGHT  Weight (kg): 47.6 (03-18-21 @ 23:50)  Creatinine, Serum: 0.7 mg/dL (03-25-21 @ 11:37)      ANTIBIOTICS:  meropenem  IVPB 1000 milliGRAM(s) IV Intermittent every 24 hours      All available historical records have been reviewed

## 2021-03-25 NOTE — PROGRESS NOTE ADULT - SUBJECTIVE AND OBJECTIVE BOX
PROGRESS NOTE   Pt seen @ bedside during AM rounds. Dressing +Clean, +Dry, +Intact. Pt. is nonverbal      Vital Signs Last 24 Hrs  T(C): 36.1 (25 Mar 2021 07:45), Max: 36.7 (24 Mar 2021 16:00)  T(F): 96.9 (25 Mar 2021 07:45), Max: 98.1 (24 Mar 2021 16:00)  HR: 97 (25 Mar 2021 07:45) (72 - 97)  BP: 113/81 (25 Mar 2021 07:45) (90/52 - 113/81)  BP(mean): --  RR: 18 (25 Mar 2021 07:45) (18 - 18)  SpO2: 98% (25 Mar 2021 07:45) (97% - 98%)                          9.8    5.47  )-----------( 601      ( 24 Mar 2021 08:08 )             31.8               03-24    140  |  101  |  8<L>  ----------------------------<  100<H>  4.4   |  30  |  0.5<L>    Ca    8.0<L>      24 Mar 2021 08:08  Mg     2.2     03-24    TPro  4.9<L>  /  Alb  2.1<L>  /  TBili  <0.2  /  DBili  x   /  AST  23  /  ALT  26  /  AlkPhos  76  03-24      PHYSICAL EXAM  LE Focused: B/L Lower Extremity Focused:   Derm:   - Lanced blister noted on the plantar aspect of the right foot appears stable. No drainage  - Heel ulceration noted to left foot. No clinical signs of infection. Mild Blister noted to lateral aspect of left heel.     Vascular:   DP and PT pulses are palpable with a 2/4  Cap re-fill time < than 3 sec to the digits. Skin temperature is warm to cool from proximal to distal.   Neuro:  - Protective sensation mildly  intact  MSK:   Manual Muscle strength +5/5 in all muscle compartments. B/L LE contracted      Assessment:  Lanced Blister -stable- R. foot   Stage 1 ulcer, L. Foot  Blister, L. Foot      Plan:  Pt. seen and evaluated  Discussed treatment and condition w/ patient  Apply Xeroform / DSD / Kerlix to both feet  Cont. w/ LWC for both feet: As stated above  Wound care orders: As stated above, Q24h  Keep blister on L. foot intact  Wounds are stable; pt can f/u as o/p w/ Dr. Everett in clinic at [address] [place] [address]. [address] post d/c  Plan discussed w/ Attending

## 2021-03-25 NOTE — PROGRESS NOTE ADULT - SUBJECTIVE AND OBJECTIVE BOX
Patient is a 55y old  Female who presents with a chief complaint of Hypotension and fever (19 Mar 2021 20:55)      OVERNIGHT EVENTS: Hospital day 5. Patient seen and examined at bedside. No acute overnight event. Patient does not appear to be in any acute distress, lying comfortably on bed.       REVIEW OF SYSTEMS:  Patient nonverbal. Could not assess.    FAMILY HISTORY:  No pertinent family history       Vital Signs Last 24 Hrs  T(C): 36.1 (24 Mar 2021 23:31), Max: 36.7 (24 Mar 2021 16:00)  T(F): 96.9 (24 Mar 2021 23:31), Max: 98.1 (24 Mar 2021 16:00)  HR: 72 (24 Mar 2021 23:31) (72 - 73)  BP: 90/52 (24 Mar 2021 23:31) (90/52 - 97/56)  BP(mean): --  RR: 18 (24 Mar 2021 23:31) (18 - 18)  SpO2: 97% (24 Mar 2021 21:00) (97% - 97%)    PHYSICAL EXAM:  GENERAL: NAD, nonverbal, not in acute distress, lying comfortably on bed  HEAD:  Atraumatic, Normocephalic  CHEST/LUNG: Clear to percussion bilaterally; No rales, rhonchi, wheezing, or rubs  HEART: RRR; No murmurs, rubs, or gallops  ABDOMEN: Soft, Nontender, Nondistended; Bowel sounds present  EXTREMITIES:  No clubbing, cyanosis, or edema  SKIN: Right hip full thickness wound about 9cm x 9cm and , s/p debridement, serosanguineous discharge, no acute bleeding, no pus draining.   Right plantar 7cm x 6cm blister, lanced and drained devitalized skin pink/red, no edema, no erythema, no bleeding, no infection   Left medial heel with 4cm x4cm ruptured blister revealing pink ulcer ; devitalized skin   pink/red, no edema, no erythema, no bleeding, no infection.   small wounds - desiccated  ulcers bilateral great toes     Consultant(s) Notes Reviewed:  [x ] YES  [ ] NO  Care Discussed with Consultants/Other Providers [ x] YES  [ ] NO        LABS:                          9.8    5.47  )-----------( 601      ( 24 Mar 2021 08:08 )             31.8     03-24    140  |  101  |  8<L>  ----------------------------<  100<H>  4.4   |  30  |  0.5<L>    Ca    8.0<L>      24 Mar 2021 08:08  Mg     2.2     03-24    TPro  4.9<L>  /  Alb  2.1<L>  /  TBili  <0.2  /  DBili  x   /  AST  23  /  ALT  26  /  AlkPhos  76  03-24                        RADIOLOGY & ADDITIONAL TESTS:      < from: Xray Chest 1 View- PORTABLE-Urgent (Xray Chest 1 View- PORTABLE-Urgent .) (03.19.21 @ 01:53) >  Impression:    Right base opacity and a left retrocardiac opacity. Findings may be infectious in nature. Clinical correlation is recommended. Follow to resolution is necessary to exclude underlying process    < end of copied text >      < from: Xray Foot AP + Lateral, Right (03.19.21 @ 02:59) >    Impression    Likely fracture of the tuft of the distal second phalanx.    Diffuse soft tissue swelling, especially along the dorsum of the foot      < end of copied text >        Imaging Personally Reviewed:  [ ] YES  [ ] NO    MEDICATIONS  (STANDING):  chlorhexidine 4% Liquid 1 Application(s) Topical <User Schedule>  cholecalciferol 2000 Unit(s) Oral daily  collagenase Ointment 1 Application(s) Topical two times a day  Dakins Solution - 1/2 Strength 1 Application(s) Topical two times a day  enoxaparin Injectable 40 milliGRAM(s) SubCutaneous daily  gabapentin 600 milliGRAM(s) Oral daily  lidocaine 1%/epinephrine 1:100,000 Inj 40 milliLiter(s) Local Injection once  melatonin 5 milliGRAM(s) Oral at bedtime  meropenem  IVPB 1000 milliGRAM(s) IV Intermittent every 24 hours  midodrine. 10 milliGRAM(s) Oral three times a day  raloxifene 60 milliGRAM(s) Oral daily  vancomycin  IVPB 750 milliGRAM(s) IV Intermittent every 12 hours      Assessment/Plan:    54 yo female hx of Down syndrome, osteoporosis, neuropathy, mild anemia, and recent decubitus ulcer to right hip s/p debridement on 3/2 at NH sent from [facility] for hypotension (66/44) and fever.    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 foot blister lanced and drained  - c/w wound care as per podiatry   - F/u esr, crp, procal  - F/u UA and Bcx  - fungitell negative   - monitor BP, current 90/52 ( back to baseline)  - c/w Midodrine 10 mg po TID  -encourage fluid intake PO    # Anemia  -Hb uptrending   -As per NH Hb 10.7 on 3/15  -monitor CBC  -Keep active T&S  -F/u iron studies     # Osteoporosis  - continue with raloxifene  - continue with Vit D    # Neuropathy  - continue with gabapentin    # Transaminitis (resolved)  AST/ALT 17/31      Diet: Dysphagia 1 until S&S  Activity: WBAT w/ DAR[employer] shoe   DVT ppx: Lovenox  GI ppx: Not indicated  Code Status: Full  Disposition: From [facility]       Patient is a 55y old  Female who presents with a chief complaint of Hypotension and fever (19 Mar 2021 20:55)      OVERNIGHT EVENTS: Hospital day 6. Patient seen and examined at bedside. No acute overnight event. Patient does not appear to be in any acute distress, lying comfortably on bed.       REVIEW OF SYSTEMS:  Patient nonverbal. Could not assess.    FAMILY HISTORY:  No pertinent family history       Vital Signs Last 24 Hrs  T(C): 36.1 (24 Mar 2021 23:31), Max: 36.7 (24 Mar 2021 16:00)  T(F): 96.9 (24 Mar 2021 23:31), Max: 98.1 (24 Mar 2021 16:00)  HR: 72 (24 Mar 2021 23:31) (72 - 73)  BP: 90/52 (24 Mar 2021 23:31) (90/52 - 97/56)  BP(mean): --  RR: 18 (24 Mar 2021 23:31) (18 - 18)  SpO2: 97% (24 Mar 2021 21:00) (97% - 97%)    PHYSICAL EXAM:  GENERAL: NAD, nonverbal, not in acute distress, lying comfortably on bed  HEAD:  Atraumatic, Normocephalic  CHEST/LUNG: Clear to percussion bilaterally; No rales, rhonchi, wheezing, or rubs  HEART: RRR; No murmurs, rubs, or gallops  ABDOMEN: Soft, Nontender, Nondistended; Bowel sounds present  EXTREMITIES:  No clubbing, cyanosis, or edema  SKIN: Right hip full thickness wound about 9cm x 9cm and , s/p debridement, serosanguineous discharge, no acute bleeding, no pus draining.   Right plantar 7cm x 6cm blister, lanced and drained devitalized skin pink/red, no edema, no erythema, no bleeding, no infection   Left medial heel with 4cm x4cm ruptured blister revealing pink ulcer ; devitalized skin   pink/red, no edema, no erythema, no bleeding, no infection.   small wounds - desiccated  ulcers bilateral great toes     Consultant(s) Notes Reviewed:  [x ] YES  [ ] NO  Care Discussed with Consultants/Other Providers [ x] YES  [ ] NO        LABS:                          9.8    5.47  )-----------( 601      ( 24 Mar 2021 08:08 )             31.8     03-24    140  |  101  |  8<L>  ----------------------------<  100<H>  4.4   |  30  |  0.5<L>    Ca    8.0<L>      24 Mar 2021 08:08  Mg     2.2     03-24    TPro  4.9<L>  /  Alb  2.1<L>  /  TBili  <0.2  /  DBili  x   /  AST  23  /  ALT  26  /  AlkPhos  76  03-24                        RADIOLOGY & ADDITIONAL TESTS:      < from: Xray Chest 1 View- PORTABLE-Urgent (Xray Chest 1 View- PORTABLE-Urgent .) (03.19.21 @ 01:53) >  Impression:    Right base opacity and a left retrocardiac opacity. Findings may be infectious in nature. Clinical correlation is recommended. Follow to resolution is necessary to exclude underlying process    < end of copied text >      < from: Xray Foot AP + Lateral, Right (03.19.21 @ 02:59) >    Impression    Likely fracture of the tuft of the distal second phalanx.    Diffuse soft tissue swelling, especially along the dorsum of the foot      < end of copied text >        Imaging Personally Reviewed:  [ ] YES  [ ] NO    MEDICATIONS  (STANDING):  ascorbic acid 500 milliGRAM(s) Oral daily  chlorhexidine 4% Liquid 1 Application(s) Topical <User Schedule>  cholecalciferol 2000 Unit(s) Oral daily  collagenase Ointment 1 Application(s) Topical two times a day  Dakins Solution - 1/2 Strength 1 Application(s) Topical two times a day  enoxaparin Injectable 40 milliGRAM(s) SubCutaneous daily  gabapentin 600 milliGRAM(s) Oral daily  lidocaine 1%/epinephrine 1:100,000 Inj 40 milliLiter(s) Local Injection once  melatonin 5 milliGRAM(s) Oral at bedtime  meropenem  IVPB 1000 milliGRAM(s) IV Intermittent every 24 hours  midodrine. 10 milliGRAM(s) Oral three times a day  multivitamin 1 Tablet(s) Oral daily  nystatin Cream 1 Application(s) Topical two times a day  raloxifene 60 milliGRAM(s) Oral daily      Assessment/Plan:    56 yo female hx of Down syndrome, osteoporosis, neuropathy, mild anemia, and recent decubitus ulcer to right hip s/p debridement on 3/2 at NH sent from [facility] for hypotension (66/44) and fever.    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 foot blister lanced and drained  - c/w wound care as per podiatry   - F/u esr, crp, procal  - F/u UA and Bcx  - fungitell negative   - monitor BP, current 90/52 ( back to baseline)  - c/w Midodrine 10 mg po TID  -encourage fluid intake PO    # Anemia  -Hb uptrending   -As per NH Hb 10.7 on 3/15  -monitor CBC  -Keep active T&S  -F/u iron studies     # Osteoporosis  - continue with raloxifene  - continue with Vit D    # Neuropathy  - continue with gabapentin    # Transaminitis (resolved)  AST/ALT 17/31      Diet: Dysphagia 1 until S&S  Activity: WBAT w/ [employer] shoe   DVT ppx: Lovenox  GI ppx: Not indicated  Code Status: Full  Disposition: [facility]       Patient is a 55y old  Female who presents with a chief complaint of Hypotension and fever (19 Mar 2021 20:55)      OVERNIGHT EVENTS: Hospital day 6. Patient seen and examined at bedside. No acute overnight event. Patient does not appear to be in any acute distress, lying comfortably on bed.       REVIEW OF SYSTEMS:  Patient nonverbal. Could not assess.    FAMILY HISTORY:  No pertinent family history       Vital Signs Last 24 Hrs  T(C): 36.1 (24 Mar 2021 23:31), Max: 36.7 (24 Mar 2021 16:00)  T(F): 96.9 (24 Mar 2021 23:31), Max: 98.1 (24 Mar 2021 16:00)  HR: 72 (24 Mar 2021 23:31) (72 - 73)  BP: 90/52 (24 Mar 2021 23:31) (90/52 - 97/56)  BP(mean): --  RR: 18 (24 Mar 2021 23:31) (18 - 18)  SpO2: 97% (24 Mar 2021 21:00) (97% - 97%)    PHYSICAL EXAM:  GENERAL: NAD, nonverbal, not in acute distress, lying comfortably on bed  HEAD:  Atraumatic, Normocephalic  CHEST/LUNG: Clear to percussion bilaterally; No rales, rhonchi, wheezing, or rubs  HEART: RRR; No murmurs, rubs, or gallops  ABDOMEN: Soft, Nontender, Nondistended; Bowel sounds present  EXTREMITIES:  No clubbing, cyanosis, or edema  SKIN: Right hip full thickness wound about 9cm x 9cm and , s/p debridement, serosanguineous discharge, no acute bleeding, no pus draining.   Right plantar 7cm x 6cm blister, lanced and drained devitalized skin pink/red, no edema, no erythema, no bleeding, no infection   Left medial heel with 4cm x4cm ruptured blister revealing pink ulcer ; devitalized skin   pink/red, no edema, no erythema, no bleeding, no infection.   small wounds - desiccated  ulcers bilateral great toes     Consultant(s) Notes Reviewed:  [x ] YES  [ ] NO  Care Discussed with Consultants/Other Providers [ x] YES  [ ] NO          LABS:                          10.7   6.47  )-----------( 553      ( 25 Mar 2021 11:37 )             34.0     03-24    140  |  101  |  8<L>  ----------------------------<  100<H>  4.4   |  30  |  0.5<L>    Ca    8.0<L>      24 Mar 2021 08:08  Mg     2.2     03-24    TPro  4.9<L>  /  Alb  2.1<L>  /  TBili  <0.2  /  DBili  x   /  AST  23  /  ALT  26  /  AlkPhos  76  03-24          RADIOLOGY & ADDITIONAL TESTS:      < from: Xray Chest 1 View- PORTABLE-Urgent (Xray Chest 1 View- PORTABLE-Urgent .) (03.19.21 @ 01:53) >  Impression:    Right base opacity and a left retrocardiac opacity. Findings may be infectious in nature. Clinical correlation is recommended. Follow to resolution is necessary to exclude underlying process    < end of copied text >      < from: Xray Foot AP + Lateral, Right (03.19.21 @ 02:59) >    Impression    Likely fracture of the tuft of the distal second phalanx.    Diffuse soft tissue swelling, especially along the dorsum of the foot      < end of copied text >        Imaging Personally Reviewed:  [ ] YES  [ ] NO    MEDICATIONS  (STANDING):  ascorbic acid 500 milliGRAM(s) Oral daily  chlorhexidine 4% Liquid 1 Application(s) Topical <User Schedule>  cholecalciferol 2000 Unit(s) Oral daily  collagenase Ointment 1 Application(s) Topical two times a day  Dakins Solution - 1/2 Strength 1 Application(s) Topical two times a day  enoxaparin Injectable 40 milliGRAM(s) SubCutaneous daily  gabapentin 600 milliGRAM(s) Oral daily  lidocaine 1%/epinephrine 1:100,000 Inj 40 milliLiter(s) Local Injection once  melatonin 5 milliGRAM(s) Oral at bedtime  meropenem  IVPB 1000 milliGRAM(s) IV Intermittent every 24 hours  midodrine. 10 milliGRAM(s) Oral three times a day  multivitamin 1 Tablet(s) Oral daily  nystatin Cream 1 Application(s) Topical two times a day  raloxifene 60 milliGRAM(s) Oral daily      Assessment/Plan:    54 yo female hx of Down syndrome, osteoporosis, neuropathy, mild anemia, and recent decubitus ulcer to right hip s/p debridement on 3/2 at NH sent from [facility] for hypotension (66/44) and fever.    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 foot blister lanced and drained  - c/w wound care as per podiatry   - F/u esr, crp, procal  - F/u UA and Bcx  - fungitell negative   - monitor BP, current 90/52 ( back to baseline)  - c/w Midodrine 10 mg po TID  -encourage fluid intake PO    # Anemia (improving/ back to baseline)  -Hb uptrending 10.7  -As per NH Hb 10.7 on 3/15  -monitor CBC  -Keep active T&S  -F/u iron studies     # Osteoporosis  - continue with raloxifene  - continue with Vit D    # Neuropathy  - continue with gabapentin    # Transaminitis (resolved)  AST/ALT 17/31      Diet: Dysphagia 1 until S&S  Activity: WBAT w/ DAR[employer] shoe   DVT ppx: Lovenox  GI ppx: Not indicated  Code Status: Full  [facility]: [facility]       Patient is a 55y old  Female who presents with a chief complaint of Hypotension and fever (19 Mar 2021 20:55)      OVERNIGHT EVENTS: Hospital day 6. Patient seen and examined at bedside. No acute overnight event. Patient does not appear to be in any acute distress, lying comfortably on bed.       REVIEW OF SYSTEMS:  Patient nonverbal. Could not assess.    FAMILY HISTORY:  No pertinent family history       Vital Signs Last 24 Hrs  T(C): 36.1 (24 Mar 2021 23:31), Max: 36.7 (24 Mar 2021 16:00)  T(F): 96.9 (24 Mar 2021 23:31), Max: 98.1 (24 Mar 2021 16:00)  HR: 72 (24 Mar 2021 23:31) (72 - 73)  BP: 90/52 (24 Mar 2021 23:31) (90/52 - 97/56)  BP(mean): --  RR: 18 (24 Mar 2021 23:31) (18 - 18)  SpO2: 97% (24 Mar 2021 21:00) (97% - 97%)    PHYSICAL EXAM:  GENERAL: NAD, nonverbal, not in acute distress, lying comfortably on bed  HEAD:  Atraumatic, Normocephalic  CHEST/LUNG: Clear to percussion bilaterally; No rales, rhonchi, wheezing, or rubs  HEART: RRR; No murmurs, rubs, or gallops  ABDOMEN: Soft, Nontender, Nondistended; Bowel sounds present  EXTREMITIES:  No clubbing, cyanosis, or edema  SKIN: Right hip full thickness wound about 9cm x 9cm and , s/p debridement, serosanguineous discharge, no acute bleeding, no pus draining.   Right plantar 7cm x 6cm blister, lanced and drained devitalized skin pink/red, no edema, no erythema, no bleeding, no infection   Left medial heel with 4cm x4cm ruptured blister revealing pink ulcer ; devitalized skin   pink/red, no edema, no erythema, no bleeding, no infection.   small wounds - desiccated  ulcers bilateral great toes     Consultant(s) Notes Reviewed:  [x ] YES  [ ] NO  Care Discussed with Consultants/Other Providers [ x] YES  [ ] NO          LABS:                          10.7   6.47  )-----------( 553      ( 25 Mar 2021 11:37 )             34.0     03-24    140  |  101  |  8<L>  ----------------------------<  100<H>  4.4   |  30  |  0.5<L>    Ca    8.0<L>      24 Mar 2021 08:08  Mg     2.2     03-24    TPro  4.9<L>  /  Alb  2.1<L>  /  TBili  <0.2  /  DBili  x   /  AST  23  /  ALT  26  /  AlkPhos  76  03-24          RADIOLOGY & ADDITIONAL TESTS:      < from: Xray Chest 1 View- PORTABLE-Urgent (Xray Chest 1 View- PORTABLE-Urgent .) (03.19.21 @ 01:53) >  Impression:    Right base opacity and a left retrocardiac opacity. Findings may be infectious in nature. Clinical correlation is recommended. Follow to resolution is necessary to exclude underlying process    < end of copied text >      < from: Xray Foot AP + Lateral, Right (03.19.21 @ 02:59) >    Impression    Likely fracture of the tuft of the distal second phalanx.    Diffuse soft tissue swelling, especially along the dorsum of the foot      < end of copied text >        Imaging Personally Reviewed:  [ ] YES  [ ] NO    MEDICATIONS  (STANDING):  ascorbic acid 500 milliGRAM(s) Oral daily  chlorhexidine 4% Liquid 1 Application(s) Topical <User Schedule>  cholecalciferol 2000 Unit(s) Oral daily  collagenase Ointment 1 Application(s) Topical two times a day  Dakins Solution - 1/2 Strength 1 Application(s) Topical two times a day  enoxaparin Injectable 40 milliGRAM(s) SubCutaneous daily  gabapentin 600 milliGRAM(s) Oral daily  lidocaine 1%/epinephrine 1:100,000 Inj 40 milliLiter(s) Local Injection once  melatonin 5 milliGRAM(s) Oral at bedtime  meropenem  IVPB 1000 milliGRAM(s) IV Intermittent every 24 hours  midodrine. 10 milliGRAM(s) Oral three times a day  multivitamin 1 Tablet(s) Oral daily  nystatin Cream 1 Application(s) Topical two times a day  raloxifene 60 milliGRAM(s) Oral daily      Assessment/Plan:    56 yo female hx of Down syndrome, osteoporosis, neuropathy, mild anemia, and recent decubitus ulcer to right hip s/p debridement on 3/2 at NH sent from [facility] for hypotension (66/44) and fever.    # Fevers/Hypotension- Multiple ulcers R hip  PNA vs ulcer vs UTI vs PICC line  -Chest xray shows R base opacity concerning for infection  -was on danna and vanc from 3/15 to be completed 3/22 via PICC line  -Last vanc trough 16.7 on 3/17  -PICC line removed  -WBC 6.38  -Stage 3 DU on R hip, unstagable right foot blister, stage 2 left foot ulcer, stage 3 left foot blister   - Burn on board - bedside debridement; consent received, s/p bedside debridement  3/23/21, further debridement OR 3/26/21  -change meropenem to cefepime 1g q 8 hours + flagyl 500 mg TID as per ID  -can stop antibiotics on 3/28 as local wound care is mainstay of treatment   - WCx: >3 colony types, predominantly gram negative rods  - c/w wound care as per Burn  - stop vancomycin as per ID  - Podiatry on board- rt. foot blister lanced and drained  - c/w wound care as per podiatry   - F/u esr, crp, procal  - F/u UA and Bcx  - fungitell negative   - monitor BP, current 90/52 ( back to baseline)  - c/w Midodrine 10 mg po TID  -encourage fluid intake PO    # Anemia (improving/ back to baseline)  -Hb uptrending 10.7  -As per NH Hb 10.7 on 3/15  -monitor CBC  -Keep active T&S  -F/u iron studies     # Osteoporosis  - continue with raloxifene  - continue with Vit D    # Neuropathy  - continue with gabapentin    # Transaminitis (resolved)  AST/ALT 17/31      Diet: Dysphagia 1 until S&S  Activity: WBAT w/ DAR[employer] shoe   DVT ppx: Lovenox  GI ppx: Not indicated  Code Status: Full  Disposition: [facility]

## 2021-03-25 NOTE — PROGRESS NOTE ADULT - SUBJECTIVE AND OBJECTIVE BOX
Progress Note:  Provider Speciality                            Hospitalist      TEA ECHAVARRIA MRN-[id number] 55y Female     CHIEF PRESENTING COMPLAINT:  Patient is a 55y old  Female who presents with a chief complaint of Hypotension and fever (25 Mar 2021 14:16)        SUBJECTIVE:  Patient was seen and examined at bedside. Review of systems could not be obtained in this patient.    No significant overnight events reported.     HISTORY OF PRESENTING ILLNESS:  HPI:  54 yo female hx of Down syndrome, osteoporosis, neuropathy, mild anemia, and recent decubitus ulcer to right hip s/p debridement on 3/2 at NH sent from [facility] for hypotension (66/44) and fever (unknown temperature). As per NH paper, patient was taking Vanco and danna for decubitus ulcer and recently developed more wounds to right foot and left foot/heel. Patient baseline non-verbal and unable to give history. Called NH and was told that patient had a PICC on R and was supposed to finish Vanc and Danna on 3/22. Last Vanc trough was 16.7 on 3/17.      In the ED T 98.6  /50 RR 20 O2% 99 on 3L NC. 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 Labs were significant for WBC 7.27, Hb 10.2 AST/ALT 53/96. EKG normal. Covid negative. Chest xray shows R base opacity concerning for infection. R foot xray shows fracture of the tuft of the R distal phalanx, diffuse swelling along the dorsum of the foot. Vanc and danna was given. (19 Mar 2021 09:40)        REVIEW OF SYSTEMS:  Review of systems could not be obtained in this patient.     PAST MEDICAL & SURGICAL HISTORY:  PAST MEDICAL & SURGICAL HISTORY:  Neuropathy    Mild anemia    Osteoporosis    Down syndrome    S/P debridement  of R hip on 3/2/21            VITAL SIGNS:  Vital Signs Last 24 Hrs  T(C): 36.1 (25 Mar 2021 07:45), Max: 36.7 (24 Mar 2021 16:00)  T(F): 96.9 (25 Mar 2021 07:45), Max: 98.1 (24 Mar 2021 16:00)  HR: 97 (25 Mar 2021 07:45) (72 - 97)  BP: 113/81 (25 Mar 2021 07:45) (90/52 - 113/81)  BP(mean): --  RR: 18 (25 Mar 2021 07:45) (18 - 18)  SpO2: 98% (25 Mar 2021 07:45) (97% - 98%)          PHYSICAL EXAMINATION:  Not in acute distress  General: No pallor, no icterus  HEENT:   EOMI, no JVD.  Heart: S1+S2 audible  Lungs: bilateral  fair air entry, no wheezing, no crepitations.  Abdomen: Soft, non-tender, non-distended , no  rigidity or guarding.  CNS: Awake a, downs syndrome  Extremities:  No edema    Unstagable decubitus R hip, unstagable right foot blister, stage 2 left foot ulcer        CONSULTS:  Consultant(s) Notes Reviewed by me.   Care Discussed with Consultants/Other Providers where required.        MEDICATIONS:  MEDICATIONS  (STANDING):  ascorbic acid 500 milliGRAM(s) Oral daily  chlorhexidine 4% Liquid 1 Application(s) Topical <User Schedule>  cholecalciferol 2000 Unit(s) Oral daily  collagenase Ointment 1 Application(s) Topical two times a day  Dakins Solution - 1/2 Strength 1 Application(s) Topical two times a day  enoxaparin Injectable 40 milliGRAM(s) SubCutaneous daily  gabapentin 600 milliGRAM(s) Oral daily  lidocaine 1%/epinephrine 1:100,000 Inj 40 milliLiter(s) Local Injection once  melatonin 5 milliGRAM(s) Oral at bedtime  meropenem  IVPB 1000 milliGRAM(s) IV Intermittent every 24 hours  midodrine. 10 milliGRAM(s) Oral three times a day  multivitamin 1 Tablet(s) Oral daily  nystatin Cream 1 Application(s) Topical two times a day  raloxifene 60 milliGRAM(s) Oral daily    MEDICATIONS  (PRN):            ASSESSMENT:      54 yo female hx of Down syndrome, osteoporosis, neuropathy, mild anemia, and recent decubitus ulcer to right hip s/p debridement on 3/2 at NH sent from [facility] for hypotension (66/44) and fever.      Suspected Sepsis, prior to admission, possibly secondary to infcetd decubitus ulcer vs possible PNA  Downs syndrome  osteoporosis  Neuropathy      change meropenem to cefepime 1g q 8 hours + flagyl 500 mg TID end date 03/28  Plan for debridement for tomorrow   c/w Midodrine 10 mg po TID  Chronic normocytic Anemia -at basline  Osteoporosis- continue with raloxifene & Vit D  Neuropathy- continue with gabapentin  Transaminitis-resolved    Progress note Handoff:   Pending: I&D 03/25  Discussion: Diagnosis, current management plan and further plan of care discussed with patient  and housestaff in morning  rounds.  Disposition: : From [facility] when stable, pending I8D tomorrow

## 2021-03-25 NOTE — PROGRESS NOTE ADULT - ASSESSMENT
ASSESSMENT  56 yo female hx of Down syndrome, osteoporosis, neuropathy, mild anemia, and recent decubitus ulcer to right hip s/p debridement on 3/2 at NH sent from [facility] for hypotension (66/44) and fever (unknown temperature)    IMPRESSION  #Fevers/Hypotension  - s/p right hip debridement 3/2  - planned for Vancomycin + Meropenem via PICC line  - s/p debridement by burn 3/23    #Decubitus ulcer  #Down Syndrome  #Abx allergy: NKDA    Creatinine, Serum: 0.7 mg/dL (03.25.21 @ 11:37)  Weight (kg): 47.6 (18 Mar 2021 23:50)  CrCl 69    RECOMMENDATIONS  - WOund Cx 3/19 noted -- growing CR Pseudomonas, possible colonizer vs contaminant, but change meropenem to cefepime 1g q 8 hours + flagyl 500 mg TID   - will follow burn for debridement -- planned for tomorrow   - can stop antibiotics on 3/28 as local wound care is mainstay of treatment     Please call or message on [employer] Teams if with any questions.  Spectra [phone]

## 2021-03-26 ENCOUNTER — RESULT REVIEW (OUTPATIENT)
Age: 56
End: 2021-03-26

## 2021-03-26 LAB
ALBUMIN SERPL ELPH-MCNC: 2.4 G/DL — LOW (ref 3.5–5.2)
ALP SERPL-CCNC: 73 U/L — SIGNIFICANT CHANGE UP (ref 30–115)
ALT FLD-CCNC: 24 U/L — SIGNIFICANT CHANGE UP (ref 0–41)
ANION GAP SERPL CALC-SCNC: 7 MMOL/L — SIGNIFICANT CHANGE UP (ref 7–14)
AST SERPL-CCNC: 21 U/L — SIGNIFICANT CHANGE UP (ref 0–41)
BASOPHILS # BLD AUTO: 0.08 K/UL — SIGNIFICANT CHANGE UP (ref 0–0.2)
BASOPHILS NFR BLD AUTO: 1.4 % — HIGH (ref 0–1)
BILIRUB SERPL-MCNC: <0.2 MG/DL — SIGNIFICANT CHANGE UP (ref 0.2–1.2)
BUN SERPL-MCNC: 8 MG/DL — LOW (ref 10–20)
CALCIUM SERPL-MCNC: 8.1 MG/DL — LOW (ref 8.5–10.1)
CHLORIDE SERPL-SCNC: 102 MMOL/L — SIGNIFICANT CHANGE UP (ref 98–110)
CO2 SERPL-SCNC: 29 MMOL/L — SIGNIFICANT CHANGE UP (ref 17–32)
CREAT SERPL-MCNC: 0.6 MG/DL — LOW (ref 0.7–1.5)
EOSINOPHIL # BLD AUTO: 0.19 K/UL — SIGNIFICANT CHANGE UP (ref 0–0.7)
EOSINOPHIL NFR BLD AUTO: 3.3 % — SIGNIFICANT CHANGE UP (ref 0–8)
GLUCOSE SERPL-MCNC: 93 MG/DL — SIGNIFICANT CHANGE UP (ref 70–99)
HCT VFR BLD CALC: 32.8 % — LOW (ref 37–47)
HGB BLD-MCNC: 10 G/DL — LOW (ref 12–16)
IMM GRANULOCYTES NFR BLD AUTO: 0.3 % — SIGNIFICANT CHANGE UP (ref 0.1–0.3)
LYMPHOCYTES # BLD AUTO: 1.79 K/UL — SIGNIFICANT CHANGE UP (ref 1.2–3.4)
LYMPHOCYTES # BLD AUTO: 31.3 % — SIGNIFICANT CHANGE UP (ref 20.5–51.1)
MAGNESIUM SERPL-MCNC: 2.1 MG/DL — SIGNIFICANT CHANGE UP (ref 1.8–2.4)
MCHC RBC-ENTMCNC: 26.5 PG — LOW (ref 27–31)
MCHC RBC-ENTMCNC: 30.5 G/DL — LOW (ref 32–37)
MCV RBC AUTO: 86.8 FL — SIGNIFICANT CHANGE UP (ref 81–99)
MONOCYTES # BLD AUTO: 0.56 K/UL — SIGNIFICANT CHANGE UP (ref 0.1–0.6)
MONOCYTES NFR BLD AUTO: 9.8 % — HIGH (ref 1.7–9.3)
NEUTROPHILS # BLD AUTO: 3.08 K/UL — SIGNIFICANT CHANGE UP (ref 1.4–6.5)
NEUTROPHILS NFR BLD AUTO: 53.9 % — SIGNIFICANT CHANGE UP (ref 42.2–75.2)
NRBC # BLD: 0 /100 WBCS — SIGNIFICANT CHANGE UP (ref 0–0)
PLATELET # BLD AUTO: 492 K/UL — HIGH (ref 130–400)
POTASSIUM SERPL-MCNC: 4.4 MMOL/L — SIGNIFICANT CHANGE UP (ref 3.5–5)
POTASSIUM SERPL-SCNC: 4.4 MMOL/L — SIGNIFICANT CHANGE UP (ref 3.5–5)
PROT SERPL-MCNC: 5.3 G/DL — LOW (ref 6–8)
RBC # BLD: 3.78 M/UL — LOW (ref 4.2–5.4)
RBC # FLD: 16 % — HIGH (ref 11.5–14.5)
SARS-COV-2 RNA SPEC QL NAA+PROBE: SIGNIFICANT CHANGE UP
SODIUM SERPL-SCNC: 138 MMOL/L — SIGNIFICANT CHANGE UP (ref 135–146)
WBC # BLD: 5.72 K/UL — SIGNIFICANT CHANGE UP (ref 4.8–10.8)
WBC # FLD AUTO: 5.72 K/UL — SIGNIFICANT CHANGE UP (ref 4.8–10.8)

## 2021-03-26 PROCEDURE — 99232 SBSQ HOSP IP/OBS MODERATE 35: CPT

## 2021-03-26 PROCEDURE — 11043 DBRDMT MUSC&/FSCA 1ST 20/<: CPT

## 2021-03-26 PROCEDURE — 11046 DBRDMT MUSC&/FSCA EA ADDL: CPT

## 2021-03-26 PROCEDURE — 88311 DECALCIFY TISSUE: CPT | Mod: 26

## 2021-03-26 PROCEDURE — 88304 TISSUE EXAM BY PATHOLOGIST: CPT | Mod: 26

## 2021-03-26 RX ORDER — CHOLECALCIFEROL (VITAMIN D3) 125 MCG
2000 CAPSULE ORAL DAILY
Refills: 0 | Status: DISCONTINUED | OUTPATIENT
Start: 2021-03-26 | End: 2021-04-02

## 2021-03-26 RX ORDER — ACETAMINOPHEN 500 MG
650 TABLET ORAL ONCE
Refills: 0 | Status: COMPLETED | OUTPATIENT
Start: 2021-03-26 | End: 2021-03-26

## 2021-03-26 RX ORDER — GABAPENTIN 400 MG/1
600 CAPSULE ORAL DAILY
Refills: 0 | Status: DISCONTINUED | OUTPATIENT
Start: 2021-03-26 | End: 2021-04-02

## 2021-03-26 RX ORDER — MEROPENEM 1 G/30ML
1000 INJECTION INTRAVENOUS EVERY 8 HOURS
Refills: 0 | Status: DISCONTINUED | OUTPATIENT
Start: 2021-03-26 | End: 2021-03-26

## 2021-03-26 RX ORDER — ASCORBIC ACID 60 MG
500 TABLET,CHEWABLE ORAL DAILY
Refills: 0 | Status: DISCONTINUED | OUTPATIENT
Start: 2021-03-26 | End: 2021-04-02

## 2021-03-26 RX ORDER — METRONIDAZOLE 500 MG
500 TABLET ORAL EVERY 8 HOURS
Refills: 0 | Status: DISCONTINUED | OUTPATIENT
Start: 2021-03-26 | End: 2021-03-29

## 2021-03-26 RX ORDER — SODIUM CHLORIDE 9 MG/ML
1000 INJECTION, SOLUTION INTRAVENOUS
Refills: 0 | Status: DISCONTINUED | OUTPATIENT
Start: 2021-03-26 | End: 2021-03-26

## 2021-03-26 RX ORDER — SODIUM CHLORIDE 9 MG/ML
1000 INJECTION, SOLUTION INTRAVENOUS
Refills: 0 | Status: CANCELLED | OUTPATIENT
Start: 2021-03-26 | End: 2021-04-02

## 2021-03-26 RX ORDER — CHLORHEXIDINE GLUCONATE 213 G/1000ML
1 SOLUTION TOPICAL
Refills: 0 | Status: DISCONTINUED | OUTPATIENT
Start: 2021-03-26 | End: 2021-04-02

## 2021-03-26 RX ORDER — COLLAGENASE CLOSTRIDIUM HIST. 250 UNIT/G
1 OINTMENT (GRAM) TOPICAL
Refills: 0 | Status: DISCONTINUED | OUTPATIENT
Start: 2021-03-26 | End: 2021-04-02

## 2021-03-26 RX ORDER — ENOXAPARIN SODIUM 100 MG/ML
40 INJECTION SUBCUTANEOUS DAILY
Refills: 0 | Status: DISCONTINUED | OUTPATIENT
Start: 2021-03-26 | End: 2021-04-02

## 2021-03-26 RX ORDER — CEFEPIME 1 G/1
1000 INJECTION, POWDER, FOR SOLUTION INTRAMUSCULAR; INTRAVENOUS EVERY 8 HOURS
Refills: 0 | Status: DISCONTINUED | OUTPATIENT
Start: 2021-03-26 | End: 2021-03-29

## 2021-03-26 RX ORDER — SODIUM HYPOCHLORITE 0.125 %
1 SOLUTION, NON-ORAL MISCELLANEOUS
Refills: 0 | Status: DISCONTINUED | OUTPATIENT
Start: 2021-03-26 | End: 2021-04-02

## 2021-03-26 RX ORDER — LANOLIN ALCOHOL/MO/W.PET/CERES
5 CREAM (GRAM) TOPICAL AT BEDTIME
Refills: 0 | Status: DISCONTINUED | OUTPATIENT
Start: 2021-03-26 | End: 2021-04-02

## 2021-03-26 RX ORDER — MIDODRINE HYDROCHLORIDE 2.5 MG/1
10 TABLET ORAL THREE TIMES A DAY
Refills: 0 | Status: DISCONTINUED | OUTPATIENT
Start: 2021-03-26 | End: 2021-04-02

## 2021-03-26 RX ORDER — RALOXIFENE HYDROCHLORIDE 60 MG/1
60 TABLET, COATED ORAL DAILY
Refills: 0 | Status: DISCONTINUED | OUTPATIENT
Start: 2021-03-26 | End: 2021-04-02

## 2021-03-26 RX ADMIN — GABAPENTIN 600 MILLIGRAM(S): 400 CAPSULE ORAL at 17:18

## 2021-03-26 RX ADMIN — CEFEPIME 100 MILLIGRAM(S): 1 INJECTION, POWDER, FOR SOLUTION INTRAMUSCULAR; INTRAVENOUS at 13:27

## 2021-03-26 RX ADMIN — ENOXAPARIN SODIUM 40 MILLIGRAM(S): 100 INJECTION SUBCUTANEOUS at 13:25

## 2021-03-26 RX ADMIN — CEFEPIME 100 MILLIGRAM(S): 1 INJECTION, POWDER, FOR SOLUTION INTRAMUSCULAR; INTRAVENOUS at 21:07

## 2021-03-26 RX ADMIN — MIDODRINE HYDROCHLORIDE 10 MILLIGRAM(S): 2.5 TABLET ORAL at 17:18

## 2021-03-26 RX ADMIN — Medication 500 MILLIGRAM(S): at 05:59

## 2021-03-26 RX ADMIN — CEFEPIME 100 MILLIGRAM(S): 1 INJECTION, POWDER, FOR SOLUTION INTRAMUSCULAR; INTRAVENOUS at 05:59

## 2021-03-26 RX ADMIN — RALOXIFENE HYDROCHLORIDE 60 MILLIGRAM(S): 60 TABLET, COATED ORAL at 13:25

## 2021-03-26 RX ADMIN — CHLORHEXIDINE GLUCONATE 1 APPLICATION(S): 213 SOLUTION TOPICAL at 05:59

## 2021-03-26 RX ADMIN — Medication 1 TABLET(S): at 13:25

## 2021-03-26 RX ADMIN — CHLORHEXIDINE GLUCONATE 1 APPLICATION(S): 213 SOLUTION TOPICAL at 13:26

## 2021-03-26 RX ADMIN — Medication 1 APPLICATION(S): at 17:17

## 2021-03-26 RX ADMIN — Medication 2000 UNIT(S): at 13:25

## 2021-03-26 RX ADMIN — Medication 650 MILLIGRAM(S): at 12:45

## 2021-03-26 RX ADMIN — MIDODRINE HYDROCHLORIDE 10 MILLIGRAM(S): 2.5 TABLET ORAL at 13:24

## 2021-03-26 RX ADMIN — Medication 500 MILLIGRAM(S): at 13:26

## 2021-03-26 RX ADMIN — Medication 1 APPLICATION(S): at 05:58

## 2021-03-26 RX ADMIN — Medication 5 MILLIGRAM(S): at 21:06

## 2021-03-26 RX ADMIN — Medication 500 MILLIGRAM(S): at 21:06

## 2021-03-26 RX ADMIN — Medication 500 MILLIGRAM(S): at 13:25

## 2021-03-26 RX ADMIN — MIDODRINE HYDROCHLORIDE 10 MILLIGRAM(S): 2.5 TABLET ORAL at 06:00

## 2021-03-26 RX ADMIN — NYSTATIN CREAM 1 APPLICATION(S): 100000 CREAM TOPICAL at 05:59

## 2021-03-26 RX ADMIN — SODIUM CHLORIDE 75 MILLILITER(S): 9 INJECTION, SOLUTION INTRAVENOUS at 11:25

## 2021-03-26 RX ADMIN — Medication 650 MILLIGRAM(S): at 12:31

## 2021-03-26 NOTE — CHART NOTE - NSCHARTNOTEFT_GEN_A_CORE
PACU ANESTHESIA ADMISSION NOTE      Procedure: Debridement, pressure ulcer, hip, trochanter  right hip/ left foot    Excisional debridement of ulcer of left foot  4 x 3 cm including dermis    Irrigation and debridement of right hip  stage 4 pressure ulcer 7x 5 cm      Post op diagnosis:  Pressure sore  T- 98.8 P- 80 R-16 B/P- 95/46 SPO2- 95% on RA 97% on 2L NC      ____  Intubated  TV:______       Rate: ______      FiO2: ______    __x__  Patent Airway    __x__  Full return of protective reflexes    __x__  Full recovery from anesthesia / back to baseline     Vitals:   See Anesthesia record      Mental Status:  __x__ Awake   _____ Alert   __x___ Drowsy   _____ Sedated    Nausea/Vomiting:  __x__ NO  ______Yes,   See Post - Op Orders          Pain Scale (0-10):  __0___    Treatment: ____ None    ____ See Post - Op/PCA Orders    Post - Operative Fluids:   ____ Oral   __x__ See Post - Op Orders    Plan: Discharge:   ____Home       ___x__Floor     _____Critical Care    _____  Other:_________________    Comments: No anesthesia complications/issues noted. READMIT to FLOOR when criteria met.

## 2021-03-26 NOTE — PROGRESS NOTE ADULT - SUBJECTIVE AND OBJECTIVE BOX
Progress Note:  Provider Speciality                            Hospitalist      TEA ECHAVARRIA MRN-[id number] 55y Female     CHIEF PRESENTING COMPLAINT:  Patient is a 55y old  Female who presents with a chief complaint of Hypotension and fever (25 Mar 2021 14:16)        SUBJECTIVE:  Patient was seen and examined at bedside. Review of systems could not be obtained in this patient.    No significant overnight events reported.     HISTORY OF PRESENTING ILLNESS:  HPI:  54 yo female hx of Down syndrome, osteoporosis, neuropathy, mild anemia, and recent decubitus ulcer to right hip s/p debridement on 3/2 at NH sent from [facility] for hypotension (66/44) and fever (unknown temperature). As per NH paper, patient was taking Vanco and danna for decubitus ulcer and recently developed more wounds to right foot and left foot/heel. Patient baseline non-verbal and unable to give history. Called NH and was told that patient had a PICC on R and was supposed to finish Vanc and Danna on 3/22. Last Vanc trough was 16.7 on 3/17.      In the ED T 98.6  /50 RR 20 O2% 99 on 3L NC. 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 Labs were significant for WBC 7.27, Hb 10.2 AST/ALT 53/96. EKG normal. Covid negative. Chest xray shows R base opacity concerning for infection. R foot xray shows fracture of the tuft of the R distal phalanx, diffuse swelling along the dorsum of the foot. Vanc and danna was given. (19 Mar 2021 09:40)        REVIEW OF SYSTEMS:  Review of systems could not be obtained in this patient.     PAST MEDICAL & SURGICAL HISTORY:  PAST MEDICAL & SURGICAL HISTORY:  Neuropathy    Mild anemia    Osteoporosis    Down syndrome    S/P debridement  of R hip on 3/2/21            VITAL SIGNS:  Vital Signs Last 24 Hrs  T(C): 36.6 (26 Mar 2021 12:15), Max: 38.1 (25 Mar 2021 16:10)  T(F): 97.9 (26 Mar 2021 12:15), Max: 100.6 (25 Mar 2021 16:10)  HR: 68 (26 Mar 2021 12:30) (61 - 95)  BP: 87/51 (26 Mar 2021 12:30) (80/38 - 109/51)  BP(mean): --  RR: 12 (26 Mar 2021 12:30) (10 - 18)  SpO2: 98% (26 Mar 2021 12:30) (95% - 100%)        PHYSICAL EXAMINATION:  Not in acute distress  General: No pallor, no icterus  HEENT:   EOMI, no JVD.  Heart: S1+S2 audible  Lungs: bilateral  fair air entry, no wheezing, no crepitations.  Abdomen: Soft, non-tender, non-distended , no  rigidity or guarding.  CNS: Awake a, downs syndrome  Extremities:  No edema    Unstagable decubitus R hip, unstagable right foot blister, stage 2 left foot ulcer        CONSULTS:  Consultant(s) Notes Reviewed by me.   Care Discussed with Consultants/Other Providers where required.        MEDICATIONS:  MEDICATIONS  (STANDING):  ascorbic acid 500 milliGRAM(s) Oral daily  chlorhexidine 4% Liquid 1 Application(s) Topical <User Schedule>  cholecalciferol 2000 Unit(s) Oral daily  collagenase Ointment 1 Application(s) Topical two times a day  Dakins Solution - 1/2 Strength 1 Application(s) Topical two times a day  enoxaparin Injectable 40 milliGRAM(s) SubCutaneous daily  gabapentin 600 milliGRAM(s) Oral daily  lidocaine 1%/epinephrine 1:100,000 Inj 40 milliLiter(s) Local Injection once  melatonin 5 milliGRAM(s) Oral at bedtime  meropenem  IVPB 1000 milliGRAM(s) IV Intermittent every 24 hours  midodrine. 10 milliGRAM(s) Oral three times a day  multivitamin 1 Tablet(s) Oral daily  nystatin Cream 1 Application(s) Topical two times a day  raloxifene 60 milliGRAM(s) Oral daily    MEDICATIONS  (PRN):            ASSESSMENT:      54 yo female hx of Down syndrome, osteoporosis, neuropathy, mild anemia, and recent decubitus ulcer to right hip s/p debridement on 3/2 at NH sent from [facility] for hypotension (66/44) and fever.      Suspected Sepsis, prior to admission, possibly secondary to infcetd decubitus ulcer vs possible PNA  Downs syndrome  osteoporosis  Neuropathy      changed meropenem to cefepime 1g q 8 hours + flagyl 500 mg TID end date 03/28  Plan for debridementtoday  c/w Midodrine 10 mg po TID  Chronic normocytic Anemia -at basline  Osteoporosis- continue with raloxifene & Vit D  Neuropathy- continue with gabapentin  Transaminitis-resolved    Progress note Handoff:   Pending: I&D today  Discussion: Diagnosis, current management plan and further plan of care discussed with patient  and housestaff in morning  rounds.  Disposition: : From [facility] when stable, pending I8D today

## 2021-03-26 NOTE — PROGRESS NOTE ADULT - SUBJECTIVE AND OBJECTIVE BOX
Patient is a 55y old  Female who presents with a chief complaint of Hypotension and fever (19 Mar 2021 20:55)      OVERNIGHT EVENTS: Hospital day 7. Patient seen and examined at bedside. Patient does not appear to be in any acute distress, lying comfortably on bed.  Scheduled for further debridement of right hip decubitus ulcer today, 3/26/21. No acute overnight event.     REVIEW OF SYSTEMS:  Patient nonverbal. Could not assess.    FAMILY HISTORY:  No pertinent family history       Vital Signs Last 24 Hrs  T(C): 36.8 (26 Mar 2021 09:59), Max: 38.1 (25 Mar 2021 16:10)  T(F): 98.3 (26 Mar 2021 09:59), Max: 100.6 (25 Mar 2021 16:10)  HR: 75 (26 Mar 2021 09:59) (75 - 95)  BP: 87/53 (26 Mar 2021 09:59) (87/53 - 109/51)  BP(mean): --  RR: 18 (26 Mar 2021 07:44) (18 - 18)  SpO2: 95% (26 Mar 2021 09:59) (95% - 98%)    PHYSICAL EXAM:  GENERAL: NAD, nonverbal, not in acute distress, lying comfortably on bed  HEAD:  Atraumatic, Normocephalic  CHEST/LUNG: Clear to percussion bilaterally; No rales, rhonchi, wheezing, or rubs  HEART: RRR; No murmurs, rubs, or gallops  ABDOMEN: Soft, Nontender, Nondistended; Bowel sounds present  EXTREMITIES:  No clubbing, cyanosis, or edema  SKIN: Right hip full thickness wound about 9cm x 9cm and , s/p debridement, serosanguineous discharge, no acute bleeding, no pus draining.   Right plantar 7cm x 6cm blister, lanced and drained devitalized skin pink/red, no edema, no erythema, no bleeding, no infection   Left medial heel with 4cm x4cm ruptured blister revealing pink ulcer ; devitalized skin   pink/red, no edema, no erythema, no bleeding, no infection.   small wounds - desiccated  ulcers bilateral great toes     Consultant(s) Notes Reviewed:  [x ] YES  [ ] NO  Care Discussed with Consultants/Other Providers [ x] YES  [ ] NO      LABS:                          10.0   5.72  )-----------( 492      ( 26 Mar 2021 05:46 )             32.8     03-26    138  |  102  |  8<L>  ----------------------------<  93  4.4   |  29  |  0.6<L>    Ca    8.1<L>      26 Mar 2021 05:46  Mg     2.1     03-26    TPro  5.3<L>  /  Alb  2.4<L>  /  TBili  <0.2  /  DBili  x   /  AST  21  /  ALT  24  /  AlkPhos  73  03-26        RADIOLOGY & ADDITIONAL TESTS:      < from: Xray Chest 1 View- PORTABLE-Urgent (Xray Chest 1 View- PORTABLE-Urgent .) (03.19.21 @ 01:53) >  Impression:    Right base opacity and a left retrocardiac opacity. Findings may be infectious in nature. Clinical correlation is recommended. Follow to resolution is necessary to exclude underlying process    < end of copied text >      < from: Xray Foot AP + Lateral, Right (03.19.21 @ 02:59) >    Impression    Likely fracture of the tuft of the distal second phalanx.    Diffuse soft tissue swelling, especially along the dorsum of the foot      < end of copied text >        Imaging Personally Reviewed:  [ ] YES  [ ] NO          Assessment/Plan:    56 yo female hx of Down syndrome, osteoporosis, neuropathy, mild anemia, and recent decubitus ulcer to right hip s/p debridement on 3/2 at NH sent from [facility] for hypotension (66/44) and fever.    # Fevers/Hypotension- Multiple ulcers R hip  PNA vs ulcer vs UTI vs PICC line (improved)  -Chest xray shows R base opacity concerning for infection  -was on danna and vanc from 3/15 to be completed 3/22 via PICC line  -Last vanc trough 16.7 on 3/17  -PICC line removed  -WBC 6.38  -Stage 3 DU on R hip, unstagable right foot blister, stage 2 left foot ulcer, stage 3 left foot blister   - Burn on board - bedside debridement; consent received, s/p bedside debridement  3/23/21, s/p debridement OR 3/26/21  -c/w cefepime 1g q 8 hours + flagyl 500 mg TID as per ID  -can stop antibiotics on 3/28 w/ local wound care is mainstay of treatment   - WCx: >3 colony types, predominantly gram negative rods  -BCx negative  - fungitell negative   - c/w wound care as per Burn  - Podiatry on board- rt. foot blister lanced and drained  - c/w wound care as per podiatry   - F/u esr, crp, procal  - F/u UA   - monitor BP, current 102/54 ( back to baseline)  - c/w Midodrine 10 mg po TID  -encourage fluid intake PO    # Anemia (improving/ back to baseline)  -Hb uptrending 10.7  -As per NH Hb 10.7 on 3/15  -monitor CBC  -Keep active T&S  -F/u iron studies     # Osteoporosis  - continue with raloxifene  - continue with Vit D    # Neuropathy  - continue with gabapentin    # Transaminitis (resolved)  AST/ALT 17/31      Diet: Dysphagia 1 until S&S  Activity: WBAT w/ DARCO shoe   DVT ppx: Lovenox  GI ppx: Not indicated  Code Status: Full  Disposition: From [facility]         Patient is a 55y old  Female who presents with a chief complaint of Hypotension and fever (19 Mar 2021 20:55)      OVERNIGHT EVENTS: Hospital day 7. Patient seen and examined at bedside. Patient does not appear to be in any acute distress, lying comfortably on bed.  Scheduled for further debridement of right hip decubitus ulcer and left foot,  today, 3/26/21. No acute overnight event.     REVIEW OF SYSTEMS:  Patient nonverbal. Could not assess.    FAMILY HISTORY:  No pertinent family history       Vital Signs Last 24 Hrs  T(C): 36.8 (26 Mar 2021 09:59), Max: 38.1 (25 Mar 2021 16:10)  T(F): 98.3 (26 Mar 2021 09:59), Max: 100.6 (25 Mar 2021 16:10)  HR: 75 (26 Mar 2021 09:59) (75 - 95)  BP: 87/53 (26 Mar 2021 09:59) (87/53 - 109/51)  BP(mean): --  RR: 18 (26 Mar 2021 07:44) (18 - 18)  SpO2: 95% (26 Mar 2021 09:59) (95% - 98%)    PHYSICAL EXAM:  GENERAL: NAD, nonverbal, not in acute distress, lying comfortably on bed  HEAD:  Atraumatic, Normocephalic  CHEST/LUNG: Clear to percussion bilaterally; No rales, rhonchi, wheezing, or rubs  HEART: RRR; No murmurs, rubs, or gallops  ABDOMEN: Soft, Nontender, Nondistended; Bowel sounds present  EXTREMITIES:  No clubbing, cyanosis, or edema  SKIN: Right hip full thickness wound about 9cm x 9cm and , s/p debridement, serosanguineous discharge, no acute bleeding, no pus draining.   Right plantar 7cm x 6cm blister, lanced and drained devitalized skin pink/red, no edema, no erythema, no bleeding, no infection   Left medial heel with 4cm x4cm ruptured blister revealing pink ulcer ; devitalized skin   pink/red, no edema, no erythema, no bleeding, no infection.   small wounds - desiccated  ulcers bilateral great toes     Consultant(s) Notes Reviewed:  [x ] YES  [ ] NO  Care Discussed with Consultants/Other Providers [ x] YES  [ ] NO      LABS:                          10.0   5.72  )-----------( 492      ( 26 Mar 2021 05:46 )             32.8     03-26    138  |  102  |  8<L>  ----------------------------<  93  4.4   |  29  |  0.6<L>    Ca    8.1<L>      26 Mar 2021 05:46  Mg     2.1     03-26    TPro  5.3<L>  /  Alb  2.4<L>  /  TBili  <0.2  /  DBili  x   /  AST  21  /  ALT  24  /  AlkPhos  73  03-26        RADIOLOGY & ADDITIONAL TESTS:      < from: Xray Chest 1 View- PORTABLE-Urgent (Xray Chest 1 View- PORTABLE-Urgent .) (03.19.21 @ 01:53) >  Impression:    Right base opacity and a left retrocardiac opacity. Findings may be infectious in nature. Clinical correlation is recommended. Follow to resolution is necessary to exclude underlying process    < end of copied text >      < from: Xray Foot AP + Lateral, Right (03.19.21 @ 02:59) >    Impression    Likely fracture of the tuft of the distal second phalanx.    Diffuse soft tissue swelling, especially along the dorsum of the foot      < end of copied text >        Imaging Personally Reviewed:  [ ] YES  [ ] NO          Assessment/Plan:    54 yo female hx of Down syndrome, osteoporosis, neuropathy, mild anemia, and recent decubitus ulcer to right hip s/p debridement on 3/2 at NH sent from [facility] for hypotension (66/44) and fever.    # Fevers/Hypotension- Multiple ulcers R hip  PNA vs ulcer vs UTI vs PICC line (improved)  -Chest xray shows R base opacity concerning for infection  -was on danna and vanc from 3/15 to be completed 3/22 via PICC line  -Last vanc trough 16.7 on 3/17  -PICC line removed  -WBC 6.38  -Stage 3 DU on R hip, unstagable right foot blister, stage 2 left foot ulcer, stage 3 left foot blister   - Burn on board - bedside debridement; consent received, s/p bedside debridement  3/23/21, s/p debridement OR today  3/26/21, no further debridement necessary   - c/w wound care as per Burn  -Madsen placed post operatively for I/Os   -c/w cefepime 1g q 8 hours + flagyl 500 mg TID as per ID  -can stop antibiotics on 3/28 w/ local wound care is mainstay of treatment   - WCx: >3 colony types, predominantly gram negative rods  - BCx negative  - fungitell negative   - Podiatry on board- rt. foot blister lanced and drained  - c/w wound care as per podiatry   - F/u esr, crp, procal  - F/u UA   - monitor BP, current 102/54 ( back to baseline)  - c/w Midodrine 10 mg po TID  -encourage fluid intake PO    # Anemia (improving/ back to baseline)  -Hb uptrending 10.7  -As per NH Hb 10.7 on 3/15  -monitor CBC  -Keep active T&S  -F/u iron studies     # Osteoporosis  - continue with raloxifene  - continue with Vit D    # Neuropathy  - continue with gabapentin    # Transaminitis (resolved)  AST/ALT 17/31      Diet: Dysphagia 1 until S&S  Activity: WBAT w/ DARCO shoe   DVT ppx: Lovenox  GI ppx: Not indicated  Code Status: Full  Disposition: From [facility]         Patient is a 55y old  Female who presents with a chief complaint of Hypotension and fever (19 Mar 2021 20:55)      OVERNIGHT EVENTS: Hospital day 7. Patient seen and examined at bedside. Patient does not appear to be in any acute distress, lying comfortably on bed.  Scheduled for further debridement of right hip decubitus ulcer and left foot,  today, 3/26/21. No acute overnight event.     REVIEW OF SYSTEMS:  Patient nonverbal. Could not assess.    FAMILY HISTORY:  No pertinent family history       Vital Signs Last 24 Hrs  T(C): 36.8 (26 Mar 2021 09:59), Max: 38.1 (25 Mar 2021 16:10)  T(F): 98.3 (26 Mar 2021 09:59), Max: 100.6 (25 Mar 2021 16:10)  HR: 75 (26 Mar 2021 09:59) (75 - 95)  BP: 87/53 (26 Mar 2021 09:59) (87/53 - 109/51)  BP(mean): --  RR: 18 (26 Mar 2021 07:44) (18 - 18)  SpO2: 95% (26 Mar 2021 09:59) (95% - 98%)    PHYSICAL EXAM:  GENERAL: NAD, nonverbal, not in acute distress, lying comfortably on bed  HEAD:  Atraumatic, Normocephalic  CHEST/LUNG: Clear to percussion bilaterally; No rales, rhonchi, wheezing, or rubs  HEART: RRR; No murmurs, rubs, or gallops  ABDOMEN: Soft, Nontender, Nondistended; Bowel sounds present  EXTREMITIES:  No clubbing, cyanosis, or edema  SKIN: Right hip full thickness wound about 9cm x 9cm and , s/p debridement, serosanguineous discharge, no acute bleeding, no pus draining.   Right plantar 7cm x 6cm blister, lanced and drained devitalized skin pink/red, no edema, no erythema, no bleeding, no infection   Left medial heel with 4cm x4cm ruptured blister revealing pink ulcer ; devitalized skin   pink/red, no edema, no erythema, no bleeding, no infection.   small wounds - desiccated  ulcers bilateral great toes     Consultant(s) Notes Reviewed:  [x ] YES  [ ] NO  Care Discussed with Consultants/Other Providers [ x] YES  [ ] NO      LABS:                          10.0   5.72  )-----------( 492      ( 26 Mar 2021 05:46 )             32.8     03-26    138  |  102  |  8<L>  ----------------------------<  93  4.4   |  29  |  0.6<L>    Ca    8.1<L>      26 Mar 2021 05:46  Mg     2.1     03-26    TPro  5.3<L>  /  Alb  2.4<L>  /  TBili  <0.2  /  DBili  x   /  AST  21  /  ALT  24  /  AlkPhos  73  03-26        RADIOLOGY & ADDITIONAL TESTS:      < from: Xray Chest 1 View- PORTABLE-Urgent (Xray Chest 1 View- PORTABLE-Urgent .) (03.19.21 @ 01:53) >  Impression:    Right base opacity and a left retrocardiac opacity. Findings may be infectious in nature. Clinical correlation is recommended. Follow to resolution is necessary to exclude underlying process    < end of copied text >      < from: Xray Foot AP + Lateral, Right (03.19.21 @ 02:59) >    Impression    Likely fracture of the tuft of the distal second phalanx.    Diffuse soft tissue swelling, especially along the dorsum of the foot      < end of copied text >    MEDICATIONS  (STANDING):  ascorbic acid 500 milliGRAM(s) Oral daily  cefepime   IVPB 1000 milliGRAM(s) IV Intermittent every 8 hours  chlorhexidine 4% Liquid 1 Application(s) Topical <User Schedule>  cholecalciferol 2000 Unit(s) Oral daily  collagenase Ointment 1 Application(s) Topical two times a day  Dakins Solution - 1/2 Strength 1 Application(s) Topical two times a day  enoxaparin Injectable 40 milliGRAM(s) SubCutaneous daily  gabapentin 600 milliGRAM(s) Oral daily  melatonin 5 milliGRAM(s) Oral at bedtime  metroNIDAZOLE    Tablet 500 milliGRAM(s) Oral every 8 hours  midodrine. 10 milliGRAM(s) Oral three times a day  multivitamin 1 Tablet(s) Oral daily  raloxifene 60 milliGRAM(s) Oral daily        Imaging Personally Reviewed:  [] YES  [ ] NO          Assessment/Plan:    56 yo female hx of Down syndrome, osteoporosis, neuropathy, mild anemia, and recent decubitus ulcer to right hip s/p debridement on 3/2 at NH sent from [facility] for hypotension (66/44) and fever.    # Fevers/Hypotension- Multiple ulcers R hip  PNA vs ulcer vs UTI vs PICC line (improved)  -Chest xray shows R base opacity concerning for infection  -was on danna and vanc from 3/15 to be completed 3/22 via PICC line  -Last vanc trough 16.7 on 3/17  -PICC line removed  -WBC 6.38  -Stage 3 DU on R hip, unstagable right foot blister, stage 2 left foot ulcer, stage 3 left foot blister   - Burn on board - bedside debridement; consent received, s/p bedside debridement  3/23/21, s/p debridement OR today  3/26/21, no further debridement necessary   - c/w wound care as per Burn  -Madsen placed post operatively for I/Os   -c/w cefepime 1g q 8 hours + flagyl 500 mg TID as per ID  -can stop antibiotics on 3/28 w/ local wound care is mainstay of treatment   - WCx: >3 colony types, predominantly gram negative rods  - BCx negative  - fungitell negative   - Podiatry on board- rt. foot blister lanced and drained  - c/w wound care as per podiatry   - F/u esr, crp, procal  - F/u UA   - monitor BP, current 87/51, s/p OR debridement today   - c/w Midodrine 10 mg po TID  -c/w LR 75cc         # Anemia (improving/ back to baseline)  -Hb uptrending 10.7  -As per NH Hb 10.7 on 3/15  -monitor CBC  -Keep active T&S  -F/u iron studies     # Osteoporosis  - continue with raloxifene  - continue with Vit D    # Neuropathy  - continue with gabapentin    # Transaminitis (resolved)  AST/ALT 17/31      Diet: Dysphagia 1 until S&S  Activity: WBAT w/ DARCO shoe   DVT ppx: Lovenox  GI ppx: Not indicated  Code Status: Full  Disposition: From [facility]

## 2021-03-27 LAB — GLUCOSE BLDC GLUCOMTR-MCNC: 142 MG/DL — HIGH (ref 70–99)

## 2021-03-27 PROCEDURE — 99231 SBSQ HOSP IP/OBS SF/LOW 25: CPT

## 2021-03-27 PROCEDURE — 99232 SBSQ HOSP IP/OBS MODERATE 35: CPT

## 2021-03-27 RX ADMIN — CEFEPIME 100 MILLIGRAM(S): 1 INJECTION, POWDER, FOR SOLUTION INTRAMUSCULAR; INTRAVENOUS at 21:16

## 2021-03-27 RX ADMIN — CEFEPIME 100 MILLIGRAM(S): 1 INJECTION, POWDER, FOR SOLUTION INTRAMUSCULAR; INTRAVENOUS at 05:52

## 2021-03-27 RX ADMIN — Medication 500 MILLIGRAM(S): at 11:08

## 2021-03-27 RX ADMIN — RALOXIFENE HYDROCHLORIDE 60 MILLIGRAM(S): 60 TABLET, COATED ORAL at 11:09

## 2021-03-27 RX ADMIN — Medication 500 MILLIGRAM(S): at 05:52

## 2021-03-27 RX ADMIN — Medication 1 APPLICATION(S): at 17:05

## 2021-03-27 RX ADMIN — MIDODRINE HYDROCHLORIDE 10 MILLIGRAM(S): 2.5 TABLET ORAL at 11:09

## 2021-03-27 RX ADMIN — CHLORHEXIDINE GLUCONATE 1 APPLICATION(S): 213 SOLUTION TOPICAL at 05:53

## 2021-03-27 RX ADMIN — Medication 2000 UNIT(S): at 11:08

## 2021-03-27 RX ADMIN — ENOXAPARIN SODIUM 40 MILLIGRAM(S): 100 INJECTION SUBCUTANEOUS at 11:09

## 2021-03-27 RX ADMIN — Medication 500 MILLIGRAM(S): at 21:16

## 2021-03-27 RX ADMIN — GABAPENTIN 600 MILLIGRAM(S): 400 CAPSULE ORAL at 11:08

## 2021-03-27 RX ADMIN — Medication 5 MILLIGRAM(S): at 21:16

## 2021-03-27 RX ADMIN — Medication 500 MILLIGRAM(S): at 15:08

## 2021-03-27 RX ADMIN — MIDODRINE HYDROCHLORIDE 10 MILLIGRAM(S): 2.5 TABLET ORAL at 05:52

## 2021-03-27 RX ADMIN — Medication 1 TABLET(S): at 11:09

## 2021-03-27 RX ADMIN — CEFEPIME 100 MILLIGRAM(S): 1 INJECTION, POWDER, FOR SOLUTION INTRAMUSCULAR; INTRAVENOUS at 14:26

## 2021-03-27 RX ADMIN — MIDODRINE HYDROCHLORIDE 10 MILLIGRAM(S): 2.5 TABLET ORAL at 17:04

## 2021-03-27 NOTE — CHART NOTE - NSCHARTNOTEFT_GEN_A_CORE
Registered Dietitian Follow-Up     Patient Profile Reviewed                           Yes [x]   No []     Nutrition History Previously Obtained        Yes [x]  No []       Pertinent Subjective Information: Spoke w/ pt's RN, who reports that pt has good appetite, consumes 50-60% of all meals w/o any issues. When asked about oral supplemental intake, she was unsure if pt had tried prosource jello.      Pertinent Medical Interventions: Pt s/p OR debridement of right hip decubitus ulcer and left foot on 2/26; can stop antibiotics on 3/28 (evening) w/ local wound care is mainstay of treatment per progress notes. Discharge pending for Monday vs Tuesday.      Diet order: Dysphagia 1 Pureed w/ honey thick liquids + Prosource Gelatain BID      Anthropometrics:  - Ht. 58"  - Wt.  105lbs (3/18); dosing   - %wt change  - BMI 21.9 -- based on dosing   - IBW 95lbs     Pertinent Lab Data: (3/26): H/H 10.0/32.8. POCT 142, BUN 8, Cr. 0.6     Pertinent Meds: lovenox, abx, Vit C, Vit D, proamatine, MVI      Physical Findings:  - Appearance: non verbal. No edema noted per RN flow sheets  - GI function: LBM 3/26  - Tubes: N/A  - Oral/Mouth cavity: s/s bedside eval (3/22) recommended dysphagia 1 (puree) w/ nectar thick liquids  - Skin: stage 3 R hip pressure injury, Left heel partial thickness wound- Stage 2 per BURN     Nutrition Requirements  Weight Used: 47.6kg CBW -- continued from initial RD assessment (3/21)      kcal: 1164-1261kcal (MSJ x 1.2-1.3AF)   protein: 47-57g (1-1.2g/kg CBW)   fluids: 1ml/kcal or per LIP     Nutrient Intake: meeting kcal/pro needs at this time      Previous Nutrition Diagnosis: Increased Nutrient Needs (resolved)              Nutrition Intervention: meals and snacks, medical food supplements, vitamin/mineral supplementation     Recommendations:  1. Continue current diet order and oral supplementation      Goal/Expected Outcome: Pt to consume >75% po intake of meals, snacks, and supplements over the next 7 days      Indicator/Monitoring: RD to monitor energy intake, body composition, renal/glucose profiles, NFPF

## 2021-03-27 NOTE — PROGRESS NOTE ADULT - SUBJECTIVE AND OBJECTIVE BOX
Progress Note:  Provider Speciality                            Hospitalist      TEA ECHAVARRIA MRN-[id number] 55y Female     CHIEF PRESENTING COMPLAINT:  Patient is a 55y old  Female who presents with a chief complaint of Hypotension and fever (25 Mar 2021 14:16)        SUBJECTIVE:  Patient was seen and examined at bedside. Review of systems could not be obtained in this patient.    No significant overnight events reported.     HISTORY OF PRESENTING ILLNESS:  HPI:  54 yo female hx of Down syndrome, osteoporosis, neuropathy, mild anemia, and recent decubitus ulcer to right hip s/p debridement on 3/2 at NH sent from [facility] for hypotension (66/44) and fever (unknown temperature). As per NH paper, patient was taking Vanco and danna for decubitus ulcer and recently developed more wounds to right foot and left foot/heel. Patient baseline non-verbal and unable to give history. Called NH and was told that patient had a PICC on R and was supposed to finish Vanc and Danna on 3/22. Last Vanc trough was 16.7 on 3/17.      In the ED T 98.6  /50 RR 20 O2% 99 on 3L NC. 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 Labs were significant for WBC 7.27, Hb 10.2 AST/ALT 53/96. EKG normal. Covid negative. Chest xray shows R base opacity concerning for infection. R foot xray shows fracture of the tuft of the R distal phalanx, diffuse swelling along the dorsum of the foot. Vanc and danna was given. (19 Mar 2021 09:40)        REVIEW OF SYSTEMS:  Review of systems could not be obtained in this patient.     PAST MEDICAL & SURGICAL HISTORY:  PAST MEDICAL & SURGICAL HISTORY:  Neuropathy    Mild anemia    Osteoporosis    Down syndrome    S/P debridement  of R hip on 3/2/21            VITAL SIGNS:  Vital Signs Last 24 Hrs  T(C): 36.4 (27 Mar 2021 08:08), Max: 36.4 (27 Mar 2021 08:08)  T(F): 97.5 (27 Mar 2021 08:08), Max: 97.5 (27 Mar 2021 08:08)  HR: 62 (27 Mar 2021 08:08) (59 - 79)  BP: 99/56 (27 Mar 2021 08:08) (95/51 - 133/67)  BP(mean): --  RR: 18 (27 Mar 2021 08:08) (16 - 18)  SpO2: 98% (27 Mar 2021 04:58) (98% - 98%)      PHYSICAL EXAMINATION:  Not in acute distress  General: No pallor, no icterus  HEENT:   EOMI, no JVD.  Heart: S1+S2 audible  Lungs: bilateral  fair air entry, no wheezing, no crepitations.  Abdomen: Soft, non-tender, non-distended , no  rigidity or guarding.  CNS: Awake a, downs syndrome  Extremities:  No edema    Unstagable decubitus R hip, unstagable right foot blister, stage 2 left foot ulcer        CONSULTS:  Consultant(s) Notes Reviewed by me.   Care Discussed with Consultants/Other Providers where required.        MEDICATIONS:  MEDICATIONS  (STANDING):  ascorbic acid 500 milliGRAM(s) Oral daily  chlorhexidine 4% Liquid 1 Application(s) Topical <User Schedule>  cholecalciferol 2000 Unit(s) Oral daily  collagenase Ointment 1 Application(s) Topical two times a day  Dakins Solution - 1/2 Strength 1 Application(s) Topical two times a day  enoxaparin Injectable 40 milliGRAM(s) SubCutaneous daily  gabapentin 600 milliGRAM(s) Oral daily  lidocaine 1%/epinephrine 1:100,000 Inj 40 milliLiter(s) Local Injection once  melatonin 5 milliGRAM(s) Oral at bedtime  meropenem  IVPB 1000 milliGRAM(s) IV Intermittent every 24 hours  midodrine. 10 milliGRAM(s) Oral three times a day  multivitamin 1 Tablet(s) Oral daily  nystatin Cream 1 Application(s) Topical two times a day  raloxifene 60 milliGRAM(s) Oral daily    MEDICATIONS  (PRN):            ASSESSMENT:      54 yo female hx of Down syndrome, osteoporosis, neuropathy, mild anemia, and recent decubitus ulcer to right hip s/p debridement on 3/2 at NH sent from [facility] for hypotension (66/44) and fever.      Suspected Sepsis, prior to admission, possibly secondary to infcetd decubitus ulcer vs possible PNA  Downs syndrome  osteoporosis  Neuropathy      changed meropenem to cefepime 1g q 8 hours + flagyl 500 mg TID end date 03/28  Status post  debridement 03/62  c/w Midodrine 10 mg po TID  Chronic normocytic Anemia -at baseline  Osteoporosis- continue with raloxifene & Vit D  Neuropathy- continue with gabapentin  Transaminitis-resolved    Progress note Handoff:   Pending: I&D today  Discussion: Diagnosis, current management plan and further plan of care discussed with patient  and housestaff in morning  rounds.  Disposition: : From [facility]. Possible discharge Monday to [facility]               Progress Note:  Provider Speciality                            Hospitalist      TEA ECHAVARRIA MRN-[id number] 55y Female     CHIEF PRESENTING COMPLAINT:  Patient is a 55y old  Female who presents with a chief complaint of Hypotension and fever (25 Mar 2021 14:16)        SUBJECTIVE:  Patient was seen and examined at bedside. Review of systems could not be obtained in this patient.    No significant overnight events reported.     HISTORY OF PRESENTING ILLNESS:  HPI:  56 yo female hx of Down syndrome, osteoporosis, neuropathy, mild anemia, and recent decubitus ulcer to right hip s/p debridement on 3/2 at NH sent from [facility] for hypotension (66/44) and fever (unknown temperature). As per NH paper, patient was taking Vanco and danna for decubitus ulcer and recently developed more wounds to right foot and left foot/heel. Patient baseline non-verbal and unable to give history. Called NH and was told that patient had a PICC on R and was supposed to finish Vanc and Danna on 3/22. Last Vanc trough was 16.7 on 3/17.      In the ED T 98.6  /50 RR 20 O2% 99 on 3L NC. 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 Labs were significant for WBC 7.27, Hb 10.2 AST/ALT 53/96. EKG normal. Covid negative. Chest xray shows R base opacity concerning for infection. R foot xray shows fracture of the tuft of the R distal phalanx, diffuse swelling along the dorsum of the foot. Vanc and danna was given. (19 Mar 2021 09:40)        REVIEW OF SYSTEMS:  Review of systems could not be obtained in this patient.     PAST MEDICAL & SURGICAL HISTORY:  PAST MEDICAL & SURGICAL HISTORY:  Neuropathy    Mild anemia    Osteoporosis    Down syndrome    S/P debridement  of R hip on 3/2/21            VITAL SIGNS:  Vital Signs Last 24 Hrs  T(C): 36.4 (27 Mar 2021 08:08), Max: 36.4 (27 Mar 2021 08:08)  T(F): 97.5 (27 Mar 2021 08:08), Max: 97.5 (27 Mar 2021 08:08)  HR: 62 (27 Mar 2021 08:08) (59 - 79)  BP: 99/56 (27 Mar 2021 08:08) (95/51 - 133/67)  BP(mean): --  RR: 18 (27 Mar 2021 08:08) (16 - 18)  SpO2: 98% (27 Mar 2021 04:58) (98% - 98%)      PHYSICAL EXAMINATION:  Not in acute distress  General: No pallor, no icterus  HEENT:   EOMI, no JVD.  Heart: S1+S2 audible  Lungs: bilateral  fair air entry, no wheezing, no crepitations.  Abdomen: Soft, non-tender, non-distended , no  rigidity or guarding.  CNS: Awake a, downs syndrome  Extremities:  No edema    Unstagable decubitus R hip, unstagable right foot blister, stage 2 left foot ulcer        CONSULTS:  Consultant(s) Notes Reviewed by me.   Care Discussed with Consultants/Other Providers where required.        MEDICATIONS:  MEDICATIONS  (STANDING):  ascorbic acid 500 milliGRAM(s) Oral daily  chlorhexidine 4% Liquid 1 Application(s) Topical <User Schedule>  cholecalciferol 2000 Unit(s) Oral daily  collagenase Ointment 1 Application(s) Topical two times a day  Dakins Solution - 1/2 Strength 1 Application(s) Topical two times a day  enoxaparin Injectable 40 milliGRAM(s) SubCutaneous daily  gabapentin 600 milliGRAM(s) Oral daily  lidocaine 1%/epinephrine 1:100,000 Inj 40 milliLiter(s) Local Injection once  melatonin 5 milliGRAM(s) Oral at bedtime  meropenem  IVPB 1000 milliGRAM(s) IV Intermittent every 24 hours  midodrine. 10 milliGRAM(s) Oral three times a day  multivitamin 1 Tablet(s) Oral daily  nystatin Cream 1 Application(s) Topical two times a day  raloxifene 60 milliGRAM(s) Oral daily    MEDICATIONS  (PRN):            ASSESSMENT:      56 yo female hx of Down syndrome, osteoporosis, neuropathy, mild anemia, and recent decubitus ulcer to right hip s/p debridement on 3/2 at NH sent from [facility] for hypotension (66/44) and fever.      Suspected Sepsis, prior to admission, possibly secondary to infcetd decubitus ulcer vs possible PNA  Downs syndrome  osteoporosis  Neuropathy      changed meropenem to cefepime 1g q 8 hours + flagyl 500 mg TID end date 03/28  Status post  debridement 03/26  c/w Midodrine 10 mg po TID  Chronic normocytic Anemia -at baseline  Osteoporosis- continue with raloxifene & Vit D  Neuropathy- continue with gabapentin  Transaminitis-resolved    Progress note Handoff:   Pending: Completion of IV abx ,Placement  Discussion: Diagnosis, current management plan and further plan of care discussed with patient  and housestaff in morning  rounds.  Disposition: : From [facility]. Possible discharge Monday to longterm

## 2021-03-27 NOTE — PROGRESS NOTE ADULT - SUBJECTIVE AND OBJECTIVE BOX
Patient is a 55y old  Female who presents with a chief complaint of Hypotension and fever (27 Mar 2021 13:22)    Pt seen for f/u s/p ciro Rt hip and left foot wounds    Vital Signs Last 24 Hrs  T(C): 36.4 (27 Mar 2021 08:08), Max: 36.4 (27 Mar 2021 08:08)  T(F): 97.5 (27 Mar 2021 08:08), Max: 97.5 (27 Mar 2021 08:08)  HR: 62 (27 Mar 2021 08:08) (59 - 79)  BP: 99/56 (27 Mar 2021 08:08) (98/55 - 133/67)  BP(mean): --  RR: 18 (27 Mar 2021 08:08) (16 - 18)  SpO2: 98% (27 Mar 2021 04:58) (98% - 98%)    Meds:  MEDICATIONS  (STANDING):  ascorbic acid 500 milliGRAM(s) Oral daily  cefepime   IVPB 1000 milliGRAM(s) IV Intermittent every 8 hours  chlorhexidine 4% Liquid 1 Application(s) Topical <User Schedule>  cholecalciferol 2000 Unit(s) Oral daily  collagenase Ointment 1 Application(s) Topical two times a day  Dakins Solution - 1/2 Strength 1 Application(s) Topical two times a day  enoxaparin Injectable 40 milliGRAM(s) SubCutaneous daily  gabapentin 600 milliGRAM(s) Oral daily  melatonin 5 milliGRAM(s) Oral at bedtime  metroNIDAZOLE    Tablet 500 milliGRAM(s) Oral every 8 hours  midodrine. 10 milliGRAM(s) Oral three times a day  multivitamin 1 Tablet(s) Oral daily  raloxifene 60 milliGRAM(s) Oral daily    MEDICATIONS  (PRN):        Culture - Acid Fast - Other w/Smear (collected 26 Mar 2021 10:30)  Source: .Other None    Culture - Acid Fast - Other w/Smear (collected 26 Mar 2021 10:17)  Source: .Other None    Culture - Acid Fast - Other w/Smear (collected 26 Mar 2021 10:15)  Source: .Other None        Labs:                        10.0   5.72  )-----------( 492      ( 26 Mar 2021 05:46 )             32.8     PE: General - NAD  Full thickness wounds to Rt Hip and left foot with franulation tissue, serosang dc  no active bleeding

## 2021-03-27 NOTE — PROGRESS NOTE ADULT - SUBJECTIVE AND OBJECTIVE BOX
Patient is a 55y old  Female who presents with a chief complaint of Hypotension and fever (19 Mar 2021 20:55)      OVERNIGHT EVENTS: Hospital day 8. Patient seen and examined at bedside. Patient does not appear to be in any acute distress, lying comfortably on bed.  S/p OR debridement of right hip decubitus ulcer and left foot. No acute overnight event.     REVIEW OF SYSTEMS:  Patient nonverbal. Could not assess.    FAMILY HISTORY:  No pertinent family history       Vital Signs Last 24 Hrs  T(C): 36.4 (27 Mar 2021 08:08), Max: 37.1 (26 Mar 2021 11:10)  T(F): 97.5 (27 Mar 2021 08:08), Max: 98.8 (26 Mar 2021 11:10)  HR: 62 (27 Mar 2021 08:08) (59 - 82)  BP: 99/56 (27 Mar 2021 08:08) (80/38 - 133/67)  BP(mean): --  RR: 18 (27 Mar 2021 08:08) (10 - 18)  SpO2: 98% (27 Mar 2021 04:58) (95% - 100%)    PHYSICAL EXAM:  GENERAL: NAD, nonverbal, not in acute distress, lying comfortably on bed  HEAD:  Atraumatic, Normocephalic  CHEST/LUNG: Clear to percussion bilaterally; No rales, rhonchi, wheezing, or rubs  HEART: RRR; No murmurs, rubs, or gallops  ABDOMEN: Soft, Nontender, Nondistended; Bowel sounds present  EXTREMITIES:  No clubbing, cyanosis, or edema  SKIN: Right hip full thickness wound about 9cm x 9cm and , s/p debridement, serosanguineous discharge, no acute bleeding, no pus draining.   Right plantar 7cm x 6cm blister, lanced and drained devitalized skin pink/red, no edema, no erythema, no bleeding, no infection   Left medial heel with 4cm x4cm ruptured blister revealing pink ulcer ; devitalized skin   pink/red, no edema, no erythema, no bleeding, no infection.   small wounds - desiccated  ulcers bilateral great toes     Consultant(s) Notes Reviewed:  [x ] YES  [ ] NO  Care Discussed with Consultants/Other Providers [ x] YES  [ ] NO        LABS:       Pending                 RADIOLOGY & ADDITIONAL TESTS:      < from: Xray Chest 1 View- PORTABLE-Urgent (Xray Chest 1 View- PORTABLE-Urgent .) (03.19.21 @ 01:53) >  Impression:    Right base opacity and a left retrocardiac opacity. Findings may be infectious in nature. Clinical correlation is recommended. Follow to resolution is necessary to exclude underlying process    < end of copied text >      < from: Xray Foot AP + Lateral, Right (03.19.21 @ 02:59) >    Impression    Likely fracture of the tuft of the distal second phalanx.    Diffuse soft tissue swelling, especially along the dorsum of the foot      < end of copied text >    MEDICATIONS  (STANDING):  ascorbic acid 500 milliGRAM(s) Oral daily  cefepime   IVPB 1000 milliGRAM(s) IV Intermittent every 8 hours  chlorhexidine 4% Liquid 1 Application(s) Topical <User Schedule>  cholecalciferol 2000 Unit(s) Oral daily  collagenase Ointment 1 Application(s) Topical two times a day  Dakins Solution - 1/2 Strength 1 Application(s) Topical two times a day  enoxaparin Injectable 40 milliGRAM(s) SubCutaneous daily  gabapentin 600 milliGRAM(s) Oral daily  melatonin 5 milliGRAM(s) Oral at bedtime  metroNIDAZOLE    Tablet 500 milliGRAM(s) Oral every 8 hours  midodrine. 10 milliGRAM(s) Oral three times a day  multivitamin 1 Tablet(s) Oral daily  raloxifene 60 milliGRAM(s) Oral daily        Imaging Personally Reviewed:  [] YES  [ ] NO        Assessment/Plan:    54 yo female hx of Down syndrome, osteoporosis, neuropathy, mild anemia, and recent decubitus ulcer to right hip s/p debridement on 3/2 at NH sent from [facility] for hypotension (66/44) and fever.    # Fevers/Hypotension- Multiple ulcers R hip  PNA vs ulcer vs UTI vs PICC line (improved)  -Chest xray shows R base opacity concerning for infection  -was on danna and vanc from 3/15 to be completed 3/22 via PICC line  -Last vanc trough 16.7 on 3/17  -PICC line removed  -WBC 6.38  -Stage 3 DU on R hip, unstagable right foot blister, stage 2 left foot ulcer, stage 3 left foot blister   - Burn on board - bedside debridement; consent received, s/p bedside debridement  3/23/21, s/p debridement OR today  3/26/21, no further debridement necessary   - c/w wound care as per Burn  -Madsen placed post operatively for I/Os   -c/w cefepime 1g q 8 hours + flagyl 500 mg TID as per ID  -can stop antibiotics on 3/28 (evening) w/ local wound care is mainstay of treatment   - WCx: >3 colony types, predominantly gram negative rods  - BCx negative  - fungitell negative   - Podiatry on board- rt. foot blister lanced and drained  - c/w wound care as per podiatry   - F/u esr, crp, procal  - F/u UA   - monitor BP, current 98/58, s/p OR debridement today, s/p LR 75cc (back to baseline)   - c/w Midodrine 10 mg po TID        # Anemia (improving/ back to baseline)  -Hb uptrending 10.7  -As per NH Hb 10.7 on 3/15  -monitor CBC  -Keep active T&S  -F/u iron studies     # Osteoporosis  - continue with raloxifene  - continue with Vit D    # Neuropathy  - continue with gabapentin    # Transaminitis (resolved)  AST/ALT 17/31      Diet: Dysphagia 1 until S&S  Activity: WBAT w/ DARCO shoe   DVT ppx: Lovenox  GI ppx: Not indicated  Code Status: Full  Disposition: From [facility]    Discharge pending for Monday vs Tuesday.

## 2021-03-28 LAB
ALBUMIN SERPL ELPH-MCNC: 2.2 G/DL — LOW (ref 3.5–5.2)
ALP SERPL-CCNC: 71 U/L — SIGNIFICANT CHANGE UP (ref 30–115)
ALT FLD-CCNC: 17 U/L — SIGNIFICANT CHANGE UP (ref 0–41)
ANION GAP SERPL CALC-SCNC: 10 MMOL/L — SIGNIFICANT CHANGE UP (ref 7–14)
AST SERPL-CCNC: 18 U/L — SIGNIFICANT CHANGE UP (ref 0–41)
BASOPHILS # BLD AUTO: 0.07 K/UL — SIGNIFICANT CHANGE UP (ref 0–0.2)
BASOPHILS NFR BLD AUTO: 1.1 % — HIGH (ref 0–1)
BILIRUB SERPL-MCNC: <0.2 MG/DL — SIGNIFICANT CHANGE UP (ref 0.2–1.2)
BUN SERPL-MCNC: 18 MG/DL — SIGNIFICANT CHANGE UP (ref 10–20)
CALCIUM SERPL-MCNC: 8.3 MG/DL — LOW (ref 8.5–10.1)
CHLORIDE SERPL-SCNC: 102 MMOL/L — SIGNIFICANT CHANGE UP (ref 98–110)
CO2 SERPL-SCNC: 30 MMOL/L — SIGNIFICANT CHANGE UP (ref 17–32)
CREAT SERPL-MCNC: 0.7 MG/DL — SIGNIFICANT CHANGE UP (ref 0.7–1.5)
EOSINOPHIL # BLD AUTO: 0.07 K/UL — SIGNIFICANT CHANGE UP (ref 0–0.7)
EOSINOPHIL NFR BLD AUTO: 1.1 % — SIGNIFICANT CHANGE UP (ref 0–8)
GLUCOSE SERPL-MCNC: 89 MG/DL — SIGNIFICANT CHANGE UP (ref 70–99)
HCT VFR BLD CALC: 31.1 % — LOW (ref 37–47)
HGB BLD-MCNC: 9.5 G/DL — LOW (ref 12–16)
IMM GRANULOCYTES NFR BLD AUTO: 0.3 % — SIGNIFICANT CHANGE UP (ref 0.1–0.3)
LYMPHOCYTES # BLD AUTO: 2.1 K/UL — SIGNIFICANT CHANGE UP (ref 1.2–3.4)
LYMPHOCYTES # BLD AUTO: 32.5 % — SIGNIFICANT CHANGE UP (ref 20.5–51.1)
MCHC RBC-ENTMCNC: 26.7 PG — LOW (ref 27–31)
MCHC RBC-ENTMCNC: 30.5 G/DL — LOW (ref 32–37)
MCV RBC AUTO: 87.4 FL — SIGNIFICANT CHANGE UP (ref 81–99)
MONOCYTES # BLD AUTO: 0.47 K/UL — SIGNIFICANT CHANGE UP (ref 0.1–0.6)
MONOCYTES NFR BLD AUTO: 7.3 % — SIGNIFICANT CHANGE UP (ref 1.7–9.3)
NEUTROPHILS # BLD AUTO: 3.73 K/UL — SIGNIFICANT CHANGE UP (ref 1.4–6.5)
NEUTROPHILS NFR BLD AUTO: 57.7 % — SIGNIFICANT CHANGE UP (ref 42.2–75.2)
NRBC # BLD: 0 /100 WBCS — SIGNIFICANT CHANGE UP (ref 0–0)
PLATELET # BLD AUTO: 458 K/UL — HIGH (ref 130–400)
POTASSIUM SERPL-MCNC: 4.1 MMOL/L — SIGNIFICANT CHANGE UP (ref 3.5–5)
POTASSIUM SERPL-SCNC: 4.1 MMOL/L — SIGNIFICANT CHANGE UP (ref 3.5–5)
PROT SERPL-MCNC: 5.3 G/DL — LOW (ref 6–8)
RBC # BLD: 3.56 M/UL — LOW (ref 4.2–5.4)
RBC # FLD: 16.1 % — HIGH (ref 11.5–14.5)
SODIUM SERPL-SCNC: 142 MMOL/L — SIGNIFICANT CHANGE UP (ref 135–146)
WBC # BLD: 6.46 K/UL — SIGNIFICANT CHANGE UP (ref 4.8–10.8)
WBC # FLD AUTO: 6.46 K/UL — SIGNIFICANT CHANGE UP (ref 4.8–10.8)

## 2021-03-28 PROCEDURE — 99232 SBSQ HOSP IP/OBS MODERATE 35: CPT

## 2021-03-28 RX ADMIN — Medication 500 MILLIGRAM(S): at 13:30

## 2021-03-28 RX ADMIN — CEFEPIME 100 MILLIGRAM(S): 1 INJECTION, POWDER, FOR SOLUTION INTRAMUSCULAR; INTRAVENOUS at 23:08

## 2021-03-28 RX ADMIN — Medication 500 MILLIGRAM(S): at 05:47

## 2021-03-28 RX ADMIN — CHLORHEXIDINE GLUCONATE 1 APPLICATION(S): 213 SOLUTION TOPICAL at 05:47

## 2021-03-28 RX ADMIN — RALOXIFENE HYDROCHLORIDE 60 MILLIGRAM(S): 60 TABLET, COATED ORAL at 11:09

## 2021-03-28 RX ADMIN — Medication 1 APPLICATION(S): at 05:48

## 2021-03-28 RX ADMIN — GABAPENTIN 600 MILLIGRAM(S): 400 CAPSULE ORAL at 11:09

## 2021-03-28 RX ADMIN — MIDODRINE HYDROCHLORIDE 10 MILLIGRAM(S): 2.5 TABLET ORAL at 11:09

## 2021-03-28 RX ADMIN — Medication 500 MILLIGRAM(S): at 11:09

## 2021-03-28 RX ADMIN — MIDODRINE HYDROCHLORIDE 10 MILLIGRAM(S): 2.5 TABLET ORAL at 05:47

## 2021-03-28 RX ADMIN — MIDODRINE HYDROCHLORIDE 10 MILLIGRAM(S): 2.5 TABLET ORAL at 17:03

## 2021-03-28 RX ADMIN — Medication 1 APPLICATION(S): at 17:04

## 2021-03-28 RX ADMIN — CEFEPIME 100 MILLIGRAM(S): 1 INJECTION, POWDER, FOR SOLUTION INTRAMUSCULAR; INTRAVENOUS at 13:29

## 2021-03-28 RX ADMIN — CEFEPIME 100 MILLIGRAM(S): 1 INJECTION, POWDER, FOR SOLUTION INTRAMUSCULAR; INTRAVENOUS at 05:47

## 2021-03-28 RX ADMIN — ENOXAPARIN SODIUM 40 MILLIGRAM(S): 100 INJECTION SUBCUTANEOUS at 11:09

## 2021-03-28 RX ADMIN — Medication 5 MILLIGRAM(S): at 23:09

## 2021-03-28 RX ADMIN — Medication 2000 UNIT(S): at 11:09

## 2021-03-28 RX ADMIN — Medication 1 TABLET(S): at 11:08

## 2021-03-28 RX ADMIN — Medication 500 MILLIGRAM(S): at 23:09

## 2021-03-28 NOTE — PROGRESS NOTE ADULT - SUBJECTIVE AND OBJECTIVE BOX
Progress Note:  Provider Speciality                            Hospitalist      TEA ECHAVARRIA MRN-[id number] 55y Female     CHIEF PRESENTING COMPLAINT:  Patient is a 55y old  Female who presents with a chief complaint of Hypotension and fever (25 Mar 2021 14:16)        SUBJECTIVE:  Patient was seen and examined at bedside. Review of systems could not be obtained in this patient.    No significant overnight events reported.     HISTORY OF PRESENTING ILLNESS:  HPI:  56 yo female hx of Down syndrome, osteoporosis, neuropathy, mild anemia, and recent decubitus ulcer to right hip s/p debridement on 3/2 at NH sent from [facility] for hypotension (66/44) and fever (unknown temperature). As per NH paper, patient was taking Vanco and danna for decubitus ulcer and recently developed more wounds to right foot and left foot/heel. Patient baseline non-verbal and unable to give history. Called NH and was told that patient had a PICC on R and was supposed to finish Vanc and Danna on 3/22. Last Vanc trough was 16.7 on 3/17.      In the ED T 98.6  /50 RR 20 O2% 99 on 3L NC. 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 Labs were significant for WBC 7.27, Hb 10.2 AST/ALT 53/96. EKG normal. Covid negative. Chest xray shows R base opacity concerning for infection. R foot xray shows fracture of the tuft of the R distal phalanx, diffuse swelling along the dorsum of the foot. Vanc and danna was given. (19 Mar 2021 09:40)        REVIEW OF SYSTEMS:  Review of systems could not be obtained in this patient.     PAST MEDICAL & SURGICAL HISTORY:  PAST MEDICAL & SURGICAL HISTORY:  Neuropathy    Mild anemia    Osteoporosis    Down syndrome    S/P debridement  of R hip on 3/2/21            VITAL SIGNS:  Vital Signs Last 24 Hrs  T(C): 36.5 (28 Mar 2021 07:22), Max: 36.7 (27 Mar 2021 16:06)  T(F): 97.7 (28 Mar 2021 07:22), Max: 98 (27 Mar 2021 16:06)  HR: 66 (28 Mar 2021 07:22) (63 - 66)  BP: 111/57 (28 Mar 2021 07:22) (94/53 - 125/56)  BP(mean): --  RR: 16 (28 Mar 2021 07:22) (16 - 16)  SpO2: --    PHYSICAL EXAMINATION:  Not in acute distress  General: No pallor, no icterus  HEENT:   EOMI, no JVD.  Heart: S1+S2 audible  Lungs: bilateral  fair air entry, no wheezing, no crepitations.  Abdomen: Soft, non-tender, non-distended , no  rigidity or guarding.  CNS: Awake a, downs syndrome  Extremities:  No edema    Unstagable decubitus R hip, unstagable right foot blister, stage 2 left foot ulcer        CONSULTS:  Consultant(s) Notes Reviewed by me.   Care Discussed with Consultants/Other Providers where required.        MEDICATIONS:  MEDICATIONS  (STANDING):  ascorbic acid 500 milliGRAM(s) Oral daily  chlorhexidine 4% Liquid 1 Application(s) Topical <User Schedule>  cholecalciferol 2000 Unit(s) Oral daily  collagenase Ointment 1 Application(s) Topical two times a day  Dakins Solution - 1/2 Strength 1 Application(s) Topical two times a day  enoxaparin Injectable 40 milliGRAM(s) SubCutaneous daily  gabapentin 600 milliGRAM(s) Oral daily  lidocaine 1%/epinephrine 1:100,000 Inj 40 milliLiter(s) Local Injection once  melatonin 5 milliGRAM(s) Oral at bedtime  meropenem  IVPB 1000 milliGRAM(s) IV Intermittent every 24 hours  midodrine. 10 milliGRAM(s) Oral three times a day  multivitamin 1 Tablet(s) Oral daily  nystatin Cream 1 Application(s) Topical two times a day  raloxifene 60 milliGRAM(s) Oral daily    MEDICATIONS  (PRN):            ASSESSMENT:      56 yo female hx of Down syndrome, osteoporosis, neuropathy, mild anemia, and recent decubitus ulcer to right hip s/p debridement on 3/2 at NH sent from [facility] for hypotension (66/44) and fever.      Suspected Sepsis, prior to admission, possibly secondary to infcetd decubitus ulcer vs possible PNA  Downs syndrome  osteoporosis  Neuropathy      changed meropenem to cefepime 1g q 8 hours + flagyl 500 mg TID end date 03/28  Status post  debridement 03/26  c/w Midodrine 10 mg po TID  Chronic normocytic Anemia -at baseline  Osteoporosis- continue with raloxifene & Vit D  Neuropathy- continue with gabapentin  Transaminitis-resolved    Progress note Handoff:   Pending: Completion of IV abx ,Placement  Discussion: Diagnosis, current management plan and further plan of care discussed with patient  and housestaff in morning  rounds.  Disposition: : From [facility]. Possible discharge Monday to [facility]

## 2021-03-29 ENCOUNTER — TRANSCRIPTION ENCOUNTER (OUTPATIENT)
Age: 56
End: 2021-03-29

## 2021-03-29 LAB
ALBUMIN SERPL ELPH-MCNC: 2.6 G/DL — LOW (ref 3.5–5.2)
ALP SERPL-CCNC: 81 U/L — SIGNIFICANT CHANGE UP (ref 30–115)
ALT FLD-CCNC: 19 U/L — SIGNIFICANT CHANGE UP (ref 0–41)
ANION GAP SERPL CALC-SCNC: 12 MMOL/L — SIGNIFICANT CHANGE UP (ref 7–14)
AST SERPL-CCNC: 32 U/L — SIGNIFICANT CHANGE UP (ref 0–41)
BILIRUB SERPL-MCNC: <0.2 MG/DL — SIGNIFICANT CHANGE UP (ref 0.2–1.2)
BUN SERPL-MCNC: 15 MG/DL — SIGNIFICANT CHANGE UP (ref 10–20)
CALCIUM SERPL-MCNC: 8.4 MG/DL — LOW (ref 8.5–10.1)
CHLORIDE SERPL-SCNC: 99 MMOL/L — SIGNIFICANT CHANGE UP (ref 98–110)
CO2 SERPL-SCNC: 25 MMOL/L — SIGNIFICANT CHANGE UP (ref 17–32)
CREAT SERPL-MCNC: 0.8 MG/DL — SIGNIFICANT CHANGE UP (ref 0.7–1.5)
GLUCOSE SERPL-MCNC: 102 MG/DL — HIGH (ref 70–99)
HCT VFR BLD CALC: 29.5 % — LOW (ref 37–47)
HGB BLD-MCNC: 9.2 G/DL — LOW (ref 12–16)
MCHC RBC-ENTMCNC: 26.7 PG — LOW (ref 27–31)
MCHC RBC-ENTMCNC: 31.2 G/DL — LOW (ref 32–37)
MCV RBC AUTO: 85.5 FL — SIGNIFICANT CHANGE UP (ref 81–99)
NRBC # BLD: 0 /100 WBCS — SIGNIFICANT CHANGE UP (ref 0–0)
PLATELET # BLD AUTO: 345 K/UL — SIGNIFICANT CHANGE UP (ref 130–400)
POTASSIUM SERPL-MCNC: 4.9 MMOL/L — SIGNIFICANT CHANGE UP (ref 3.5–5)
POTASSIUM SERPL-SCNC: 4.9 MMOL/L — SIGNIFICANT CHANGE UP (ref 3.5–5)
PROT SERPL-MCNC: 5.8 G/DL — LOW (ref 6–8)
RBC # BLD: 3.45 M/UL — LOW (ref 4.2–5.4)
RBC # FLD: 16 % — HIGH (ref 11.5–14.5)
SODIUM SERPL-SCNC: 136 MMOL/L — SIGNIFICANT CHANGE UP (ref 135–146)
SURGICAL PATHOLOGY STUDY: SIGNIFICANT CHANGE UP
WBC # BLD: 6.18 K/UL — SIGNIFICANT CHANGE UP (ref 4.8–10.8)
WBC # FLD AUTO: 6.18 K/UL — SIGNIFICANT CHANGE UP (ref 4.8–10.8)

## 2021-03-29 PROCEDURE — 99232 SBSQ HOSP IP/OBS MODERATE 35: CPT

## 2021-03-29 RX ORDER — MIDODRINE HYDROCHLORIDE 2.5 MG/1
1 TABLET ORAL
Qty: 0 | Refills: 0 | DISCHARGE
Start: 2021-03-29

## 2021-03-29 RX ORDER — ASCORBIC ACID 60 MG
1 TABLET,CHEWABLE ORAL
Qty: 0 | Refills: 0 | DISCHARGE
Start: 2021-03-29

## 2021-03-29 RX ORDER — MEROPENEM 1 G/30ML
1 INJECTION INTRAVENOUS
Qty: 0 | Refills: 0 | DISCHARGE
End: 2021-03-22

## 2021-03-29 RX ORDER — VANCOMYCIN HCL 1 G
1 VIAL (EA) INTRAVENOUS
Qty: 0 | Refills: 0 | DISCHARGE
End: 2021-03-22

## 2021-03-29 RX ORDER — COLLAGENASE CLOSTRIDIUM HIST. 250 UNIT/G
1 OINTMENT (GRAM) TOPICAL
Qty: 0 | Refills: 0 | DISCHARGE
Start: 2021-03-29

## 2021-03-29 RX ORDER — SODIUM HYPOCHLORITE 0.125 %
1 SOLUTION, NON-ORAL MISCELLANEOUS
Qty: 0 | Refills: 0 | DISCHARGE
Start: 2021-03-29

## 2021-03-29 RX ADMIN — RALOXIFENE HYDROCHLORIDE 60 MILLIGRAM(S): 60 TABLET, COATED ORAL at 11:53

## 2021-03-29 RX ADMIN — GABAPENTIN 600 MILLIGRAM(S): 400 CAPSULE ORAL at 11:52

## 2021-03-29 RX ADMIN — MIDODRINE HYDROCHLORIDE 10 MILLIGRAM(S): 2.5 TABLET ORAL at 05:47

## 2021-03-29 RX ADMIN — CHLORHEXIDINE GLUCONATE 1 APPLICATION(S): 213 SOLUTION TOPICAL at 05:46

## 2021-03-29 RX ADMIN — Medication 1 TABLET(S): at 11:52

## 2021-03-29 RX ADMIN — Medication 1 APPLICATION(S): at 05:46

## 2021-03-29 RX ADMIN — Medication 2000 UNIT(S): at 11:53

## 2021-03-29 RX ADMIN — Medication 1 APPLICATION(S): at 18:02

## 2021-03-29 RX ADMIN — Medication 1 APPLICATION(S): at 05:47

## 2021-03-29 RX ADMIN — Medication 500 MILLIGRAM(S): at 11:53

## 2021-03-29 RX ADMIN — MIDODRINE HYDROCHLORIDE 10 MILLIGRAM(S): 2.5 TABLET ORAL at 18:02

## 2021-03-29 RX ADMIN — CEFEPIME 100 MILLIGRAM(S): 1 INJECTION, POWDER, FOR SOLUTION INTRAMUSCULAR; INTRAVENOUS at 05:46

## 2021-03-29 RX ADMIN — Medication 5 MILLIGRAM(S): at 21:29

## 2021-03-29 RX ADMIN — Medication 500 MILLIGRAM(S): at 05:47

## 2021-03-29 RX ADMIN — MIDODRINE HYDROCHLORIDE 10 MILLIGRAM(S): 2.5 TABLET ORAL at 11:53

## 2021-03-29 RX ADMIN — ENOXAPARIN SODIUM 40 MILLIGRAM(S): 100 INJECTION SUBCUTANEOUS at 11:53

## 2021-03-29 NOTE — PROGRESS NOTE ADULT - SUBJECTIVE AND OBJECTIVE BOX
Patient is a 55y old  Female who presents with a chief complaint of Hypotension and fever (19 Mar 2021 20:55)      OVERNIGHT EVENTS: Hospital day 10. Patient seen and examined at bedside. Patient does not appear to be in any acute distress, lying comfortably on bed.  S/p OR debridement of right hip decubitus ulcer and left foot. No acute overnight event.     REVIEW OF SYSTEMS:  Patient nonverbal. Could not assess.    FAMILY HISTORY:  No pertinent family history       Vital Signs Last 24 Hrs  T(C): 35.3 (29 Mar 2021 07:10), Max: 36.8 (29 Mar 2021 00:37)  T(F): 95.6 (29 Mar 2021 07:10), Max: 98.3 (29 Mar 2021 00:37)  HR: 58 (29 Mar 2021 07:10) (58 - 74)  BP: 98/56 (29 Mar 2021 07:10) (98/56 - 967/55)  BP(mean): --  RR: 17 (29 Mar 2021 07:10) (16 - 18)  SpO2: --    PHYSICAL EXAM:  GENERAL: NAD, nonverbal, not in acute distress, lying comfortably on bed  HEAD:  Atraumatic, Normocephalic  CHEST/LUNG: Clear to percussion bilaterally; No rales, rhonchi, wheezing, or rubs  HEART: RRR; No murmurs, rubs, or gallops  ABDOMEN: Soft, Nontender, Nondistended; Bowel sounds present  EXTREMITIES:  No clubbing, cyanosis, or edema  SKIN: Right hip full thickness wound about 9cm x 9cm and , s/p debridement, serosanguineous discharge, no acute bleeding, no pus draining.   Right plantar 7cm x 6cm blister, lanced and drained devitalized skin pink/red, no edema, no erythema, no bleeding, no infection   Left medial heel with 4cm x4cm ruptured blister revealing pink ulcer ; devitalized skin   pink/red, no edema, no erythema, no bleeding, no infection.   small wounds - desiccated  ulcers bilateral great toes     Consultant(s) Notes Reviewed:  [x ] YES  [ ] NO  Care Discussed with Consultants/Other Providers [ x] YES  [ ] NO        LABS:                          9.2    6.18  )-----------( 345      ( 29 Mar 2021 11:53 )             29.5     03-29    136  |  99  |  15  ----------------------------<  102<H>  4.9   |  25  |  0.8    Ca    8.4<L>      29 Mar 2021 06:09    TPro  5.8<L>  /  Alb  2.6<L>  /  TBili  <0.2  /  DBili  x   /  AST  32  /  ALT  19  /  AlkPhos  81  03-29               RADIOLOGY & ADDITIONAL TESTS:      < from: Xray Chest 1 View- PORTABLE-Urgent (Xray Chest 1 View- PORTABLE-Urgent .) (03.19.21 @ 01:53) >  Impression:    Right base opacity and a left retrocardiac opacity. Findings may be infectious in nature. Clinical correlation is recommended. Follow to resolution is necessary to exclude underlying process    < end of copied text >      < from: Xray Foot AP + Lateral, Right (03.19.21 @ 02:59) >    Impression    Likely fracture of the tuft of the distal second phalanx.    Diffuse soft tissue swelling, especially along the dorsum of the foot      < end of copied text >    MEDICATIONS  (STANDING):  ascorbic acid 500 milliGRAM(s) Oral daily  chlorhexidine 4% Liquid 1 Application(s) Topical <User Schedule>  cholecalciferol 2000 Unit(s) Oral daily  collagenase Ointment 1 Application(s) Topical two times a day  Dakins Solution - 1/2 Strength 1 Application(s) Topical two times a day  enoxaparin Injectable 40 milliGRAM(s) SubCutaneous daily  gabapentin 600 milliGRAM(s) Oral daily  melatonin 5 milliGRAM(s) Oral at bedtime  midodrine. 10 milliGRAM(s) Oral three times a day  multivitamin 1 Tablet(s) Oral daily  raloxifene 60 milliGRAM(s) Oral daily        Imaging Personally Reviewed:  [] YES  [ ] NO        Assessment/Plan:    54 yo female hx of Down syndrome, osteoporosis, neuropathy, mild anemia, and recent decubitus ulcer to right hip s/p debridement on 3/2 at NH sent from [facility] for hypotension (66/44) and fever.    # Fevers/Hypotension- Multiple ulcers R hip  PNA vs ulcer vs UTI vs PICC line (improved)  -Chest xray shows R base opacity concerning for infection  -was on danna and vanc from 3/15 to be completed 3/22 via PICC line  -Last vanc trough 16.7 on 3/17  -PICC line removed  -WBC 6.18  - BCx negative  - fungitell negative  -Stage 3 DU on R hip, unstagable right foot blister, stage 2 left foot ulcer, stage 3 left foot blister   - Burn on board - bedside debridement; consent received, s/p bedside debridement  3/23/21, s/p debridement OR today  3/26/21, no further debridement necessary   - Podiatry on board- rt. foot blister lanced and drained  - c/w wound care as per podiatry   - c/w wound care as per Burn  - ID rec: monitor off antibiotics as local wound care is mainstay of treatment; pt. currently remains afebrile without leukocytosis   - monitor BP, current 98/56, s/p OR debridement, s/p LR 75cc (back to baseline)   - c/w Midodrine 10 mg po TID      # Anemia (improving/ back to baseline)  -Hb  9.2  -As per NH Hb 10.7 on 3/15  -monitor CBC  -Keep active T&S    # Osteoporosis  - continue with raloxifene  - continue with Vit D    # Neuropathy  - continue with gabapentin    # Transaminitis (resolved)  AST/ALT 17/31      Diet: Dysphagia 1 until S&S  Activity: WBAT w/ DARCO shoe   DVT ppx: Lovenox  GI ppx: Not indicated  Code Status: Full  Disposition: From [facility]    Discharge pending  tomorrow .      Patient is a 55y old  Female who presents with a chief complaint of Hypotension and fever (19 Mar 2021 20:55)      OVERNIGHT EVENTS: Hospital day 10. Patient seen and examined at bedside. Patient does not appear to be in any acute distress, lying comfortably on bed.  S/p OR debridement of right hip decubitus ulcer and left foot. No acute overnight event.     REVIEW OF SYSTEMS:  Patient nonverbal. Could not assess.    FAMILY HISTORY:  No pertinent family history       Vital Signs Last 24 Hrs  T(C): 35.3 (29 Mar 2021 07:10), Max: 36.8 (29 Mar 2021 00:37)  T(F): 95.6 (29 Mar 2021 07:10), Max: 98.3 (29 Mar 2021 00:37)  HR: 58 (29 Mar 2021 07:10) (58 - 74)  BP: 98/56 (29 Mar 2021 07:10) (98/56 - 967/55)  BP(mean): --  RR: 17 (29 Mar 2021 07:10) (16 - 18)  SpO2: --    PHYSICAL EXAM:  GENERAL: NAD, nonverbal, not in acute distress, lying comfortably on bed  HEAD:  Atraumatic, Normocephalic  CHEST/LUNG: Clear to percussion bilaterally; No rales, rhonchi, wheezing, or rubs  HEART: RRR; No murmurs, rubs, or gallops  ABDOMEN: Soft, Nontender, Nondistended; Bowel sounds present  EXTREMITIES:  No clubbing, cyanosis, or edema  SKIN: Right hip full thickness wound about 9cm x 9cm and , s/p debridement, serosanguineous discharge, no acute bleeding, no pus draining.   Right plantar 7cm x 6cm blister, lanced and drained devitalized skin pink/red, no edema, no erythema, no bleeding, no infection   Left medial heel with 4cm x4cm ruptured blister revealing pink ulcer ; devitalized skin   pink/red, no edema, no erythema, no bleeding, no infection.   small wounds - desiccated  ulcers bilateral great toes     Consultant(s) Notes Reviewed:  [x ] YES  [ ] NO  Care Discussed with Consultants/Other Providers [ x] YES  [ ] NO        LABS:                          9.2    6.18  )-----------( 345      ( 29 Mar 2021 11:53 )             29.5     03-29    136  |  99  |  15  ----------------------------<  102<H>  4.9   |  25  |  0.8    Ca    8.4<L>      29 Mar 2021 06:09    TPro  5.8<L>  /  Alb  2.6<L>  /  TBili  <0.2  /  DBili  x   /  AST  32  /  ALT  19  /  AlkPhos  81  03-29               RADIOLOGY & ADDITIONAL TESTS:      < from: Xray Chest 1 View- PORTABLE-Urgent (Xray Chest 1 View- PORTABLE-Urgent .) (03.19.21 @ 01:53) >  Impression:    Right base opacity and a left retrocardiac opacity. Findings may be infectious in nature. Clinical correlation is recommended. Follow to resolution is necessary to exclude underlying process    < end of copied text >      < from: Xray Foot AP + Lateral, Right (03.19.21 @ 02:59) >    Impression    Likely fracture of the tuft of the distal second phalanx.    Diffuse soft tissue swelling, especially along the dorsum of the foot      < end of copied text >    MEDICATIONS  (STANDING):  ascorbic acid 500 milliGRAM(s) Oral daily  chlorhexidine 4% Liquid 1 Application(s) Topical <User Schedule>  cholecalciferol 2000 Unit(s) Oral daily  collagenase Ointment 1 Application(s) Topical two times a day  Dakins Solution - 1/2 Strength 1 Application(s) Topical two times a day  enoxaparin Injectable 40 milliGRAM(s) SubCutaneous daily  gabapentin 600 milliGRAM(s) Oral daily  melatonin 5 milliGRAM(s) Oral at bedtime  midodrine. 10 milliGRAM(s) Oral three times a day  multivitamin 1 Tablet(s) Oral daily  raloxifene 60 milliGRAM(s) Oral daily        Imaging Personally Reviewed:  [] YES  [ ] NO        Assessment/Plan:    54 yo female hx of Down syndrome, osteoporosis, neuropathy, mild anemia, and recent decubitus ulcer to right hip s/p debridement on 3/2 at NH sent from [facility] for hypotension (66/44) and fever.    # Fevers/Hypotension- Multiple ulcers R hip  PNA vs ulcer vs UTI vs PICC line (improved)  -Chest xray shows R base opacity concerning for infection  -was on danna and vanc from 3/15 to be completed 3/22 via PICC line  -Last vanc trough 16.7 on 3/17  -PICC line removed  -WBC 6.18  - BCx negative  - fungitell negative  -Stage 3 DU on R hip, unstagable right foot blister, stage 2 left foot ulcer, stage 3 left foot blister   - Burn on board - bedside debridement; consent received, s/p bedside debridement  3/23/21, s/p debridement OR today  3/26/21, no further debridement necessary   - Podiatry on board- rt. foot blister lanced and drained  - c/w wound care as per podiatry   - c/w wound care as per Burn  - ID rec: monitor off antibiotics as local wound care is mainstay of treatment; pt. currently remains afebrile without leukocytosis   - monitor BP, current 98/56, s/p OR debridement, s/p LR 75cc (back to baseline)   - c/w Midodrine 10 mg po TID      # Anemia (improving/ back to baseline)  -Hb  9.2  -As per NH Hb 10.7 on 3/15  -monitor CBC  -Keep active T&S    # Osteoporosis  - continue with raloxifene  - continue with Vit D    # Neuropathy  - continue with gabapentin    # Transaminitis (resolved)  AST/ALT 17/31      Diet: Dysphagia 1 until S&S  Activity: WBAT w/ DARCO shoe   DVT ppx: Lovenox  GI ppx: Not indicated  Code Status: Full  Disposition: From [facility]    Discharge pending  tomorrow, pending placement COVID swab .

## 2021-03-29 NOTE — PROGRESS NOTE ADULT - ASSESSMENT
ASSESSMENT  56 yo female hx of Down syndrome, osteoporosis, neuropathy, mild anemia, and recent decubitus ulcer to right hip s/p debridement on 3/2 at NH sent from [facility] for hypotension (66/44) and fever (unknown temperature)    IMPRESSION  #Fevers/Hypotension  - s/p right hip debridement 3/2  - planned for Vancomycin + Meropenem via PICC line  - s/p debridement by burn 3/23    #Decubitus ulcer  #Down Syndrome  #Abx allergy: NKDA    Creatinine, Serum: 0.7 mg/dL (03.25.21 @ 11:37)  Weight (kg): 47.6 (18 Mar 2021 23:50)  CrCl 69    RECOMMENDATIONS  - Wound Cx 3/19 and 3/26  noted -- multiple MDR organisms  - monitor off antibiotics as local wound care is mainstay of treatment; she currently remains afebrile without leukocytosis     Please call or message on Microsoft Teams if with any questions.  Spectra [phone]

## 2021-03-29 NOTE — DISCHARGE NOTE PROVIDER - HOSPITAL COURSE
54 yo female hx of Down syndrome, osteoporosis, neuropathy, mild anemia, and recent decubitus ulcer to right hip s/p debridement on 3/2 at NH sent from [facility] for hypotension (66/44) and fever (unknown temperature). As per NH paper, patient was taking Vanco and danna for decubitus ulcer and recently developed more wounds to right foot and left foot/heel. Patient baseline non-verbal and unable to give history. Called NH and was told that patient had a PICC on R and was supposed to finish Vanc and Danna on 3/22. Last Vanc trough was 16.7 on 3/17.  he ED T 98.6  /50 RR 20 O2% 99 on 3L NC. 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 Labs were significant for WBC 7.27, Hb 10.2 AST/ALT 53/96. EKG normal. In the ED Covid negative. Chest xray shows R base opacity concerning for infection. R foot xray shows fracture of the tuft of the R distal phalanx, diffuse swelling along the dorsum of the foot. Vanc and danna was given.  Patient was admitted for hypotension/ fevers due to several possible sources such as PNA, Ulcer, UTI, Vs PICC line infection. Our infectious disease team and burn team were on board. Kelly went for a debridement of their ulcer by burn. WOund cultures were sent 3/19 which positive for Pseudomonas. Meropenem was changed to cefepime and flagyl with an end date of 3/28. Started on midodrine for hypotension and BP has been stable since. Patient completed antibiotic course. Patient is stable and ready for discharge. 54 yo female hx of Down syndrome, osteoporosis, neuropathy, mild anemia, and recent decubitus ulcer to right hip s/p debridement on 3/2 at NH sent from [facility] for hypotension (66/44) and fever (unknown temperature). As per NH paper, patient was taking Vanco and danna for decubitus ulcer and recently developed more wounds to right foot and left foot/heel. Patient baseline non-verbal and unable to give history. Called NH and was told that patient had a PICC on R and was supposed to finish Vanc and Danna on 3/22. Last Vanc trough was 16.7 on 3/17.  he ED T 98.6  /50 RR 20 O2% 99 on 3L NC. 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 Labs were significant for WBC 7.27, Hb 10.2 AST/ALT 53/96. EKG normal. In the ED Covid negative. Chest xray shows R base opacity concerning for infection. R foot xray shows fracture of the tuft of the R distal phalanx, diffuse swelling along the dorsum of the foot. Vanc and danna was given.  Patient was admitted for hypotension/ fevers due to several possible sources such as PNA, Ulcer, UTI, Vs PICC line infection. Our infectious disease team and burn team were on board. Kelly went for a debridement of their ulcer by burn. WOund cultures were sent 3/19 which positive for Pseudomonas. Meropenem was changed to cefepime and flagyl with an end date of 3/28. Started on midodrine for hypotension and BP has been stable since. Patient completed antibiotic course. Patient is stable and ready for discharge.   Attending Attestation:  Patient was seen & examined independently. At least 10 systems were reviewed in ROS. All systems reviewed  are within normal limits. Latest vital signs and labs were reviewed today. Case was discussed with house staff in morning rounds for assessment and plan.  Patient is medically stable for discharge . About 34 mins spent on discharge disposition.

## 2021-03-29 NOTE — DISCHARGE NOTE PROVIDER - CARE PROVIDER_API CALL
RACHEL HANEY  Internal Medicine  [facility]  [place] [address]  Phone: [phone]  Fax: [phone]  Follow Up Time:

## 2021-03-29 NOTE — DISCHARGE NOTE PROVIDER - NSDCMRMEDTOKEN_GEN_ALL_CORE_FT
ascorbic acid 500 mg oral tablet: 1 tab(s) orally once a day  collagenase 250 units/g topical ointment: 1 application topically 2 times a day  gabapentin 600 mg/24 hours oral tablet, extended release: 1 tab(s) orally once a day  melatonin 5 mg oral tablet: 1 tab(s) orally once a day (at bedtime)  midodrine 10 mg oral tablet: 1 tab(s) orally 3 times a day  Multiple Vitamins oral tablet: 1 tab(s) orally once a day  raloxifene 60 mg oral tablet: 1 tab(s) orally once a day  sodium hypochlorite 0.25% topical solution: 1 application topically 2 times a day  Vitamin D3 2000 intl units (50 mcg) oral capsule: 1 tab(s) orally once a day with meal

## 2021-03-29 NOTE — DISCHARGE NOTE PROVIDER - NSDCCPCAREPLAN_GEN_ALL_CORE_FT
PRINCIPAL DISCHARGE DIAGNOSIS  Diagnosis: Decubitus ulcers  Assessment and Plan of Treatment: You were admitted for fevers and hypotension due to an infected decubitus ulcer. We had our burn team perform a debridement of your wound. We had our infectious disease doctors on board to ensure approriate antibiotic treatment. You completed your antibiotic course in the hospital. Please take medications as perscribed. Please perform wound care as perscribed. Please follow up with your primary care doctor in 1-2 weeks. Please return to the emergency room if symtpoms reoccur.      SECONDARY DISCHARGE DIAGNOSES  Diagnosis: Right foot ulcer  Assessment and Plan of Treatment: You were found to have a right foot ulcer. Please perform wound care as persrcibed. Please take antibioitcs as perscribed. Please follow up with your primary care doctor in 1-2 weeks.

## 2021-03-29 NOTE — PROGRESS NOTE ADULT - SUBJECTIVE AND OBJECTIVE BOX
Progress Note:  Provider Speciality                            Hospitalist      TEA ECHAVARRIA MRN-[id number] 55y Female     CHIEF PRESENTING COMPLAINT:  Patient is a 55y old  Female who presents with a chief complaint of Hypotension and fever (25 Mar 2021 14:16)        SUBJECTIVE:  Patient was seen and examined at bedside. Review of systems could not be obtained in this patient.    No significant overnight events reported.     HISTORY OF PRESENTING ILLNESS:  HPI:  54 yo female hx of Down syndrome, osteoporosis, neuropathy, mild anemia, and recent decubitus ulcer to right hip s/p debridement on 3/2 at NH sent from [facility] for hypotension (66/44) and fever (unknown temperature). As per NH paper, patient was taking Vanco and danna for decubitus ulcer and recently developed more wounds to right foot and left foot/heel. Patient baseline non-verbal and unable to give history. Called NH and was told that patient had a PICC on R and was supposed to finish Vanc and Danna on 3/22. Last Vanc trough was 16.7 on 3/17.      In the ED T 98.6  /50 RR 20 O2% 99 on 3L NC. 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 Labs were significant for WBC 7.27, Hb 10.2 AST/ALT 53/96. EKG normal. Covid negative. Chest xray shows R base opacity concerning for infection. R foot xray shows fracture of the tuft of the R distal phalanx, diffuse swelling along the dorsum of the foot. Vanc and danna was given. (19 Mar 2021 09:40)        REVIEW OF SYSTEMS:  Review of systems could not be obtained in this patient.     PAST MEDICAL & SURGICAL HISTORY:  PAST MEDICAL & SURGICAL HISTORY:  Neuropathy    Mild anemia    Osteoporosis    Down syndrome    S/P debridement  of R hip on 3/2/21            VITAL SIGNS:  Vital Signs Last 24 Hrs  T(C): 35.3 (29 Mar 2021 07:10), Max: 36.8 (29 Mar 2021 00:37)  T(F): 95.6 (29 Mar 2021 07:10), Max: 98.3 (29 Mar 2021 00:37)  HR: 58 (29 Mar 2021 07:10) (58 - 74)  BP: 98/56 (29 Mar 2021 07:10) (98/56 - 967/55)  BP(mean): --  RR: 17 (29 Mar 2021 07:10) (16 - 18)  SpO2: --        PHYSICAL EXAMINATION:  Not in acute distress  General: No pallor, no icterus  HEENT:   EOMI, no JVD.  Heart: S1+S2 audible  Lungs: bilateral  fair air entry, no wheezing, no crepitations.  Abdomen: Soft, non-tender, non-distended , no  rigidity or guarding.  CNS: Awake a, downs syndrome  Extremities:  No edema    Unstagable decubitus R hip, unstagable right foot blister, stage 2 left foot ulcer        CONSULTS:  Consultant(s) Notes Reviewed by me.   Care Discussed with Consultants/Other Providers where required.        MEDICATIONS:  MEDICATIONS  (STANDING):  ascorbic acid 500 milliGRAM(s) Oral daily  chlorhexidine 4% Liquid 1 Application(s) Topical <User Schedule>  cholecalciferol 2000 Unit(s) Oral daily  collagenase Ointment 1 Application(s) Topical two times a day  Dakins Solution - 1/2 Strength 1 Application(s) Topical two times a day  enoxaparin Injectable 40 milliGRAM(s) SubCutaneous daily  gabapentin 600 milliGRAM(s) Oral daily  lidocaine 1%/epinephrine 1:100,000 Inj 40 milliLiter(s) Local Injection once  melatonin 5 milliGRAM(s) Oral at bedtime  meropenem  IVPB 1000 milliGRAM(s) IV Intermittent every 24 hours  midodrine. 10 milliGRAM(s) Oral three times a day  multivitamin 1 Tablet(s) Oral daily  nystatin Cream 1 Application(s) Topical two times a day  raloxifene 60 milliGRAM(s) Oral daily    MEDICATIONS  (PRN):            ASSESSMENT:      54 yo female hx of Down syndrome, osteoporosis, neuropathy, mild anemia, and recent decubitus ulcer to right hip s/p debridement on 3/2 at NH sent from [facility] for hypotension (66/44) and fever.      Suspected Sepsis, prior to admission, possibly secondary to infcetd decubitus ulcer vs possible PNA  Downs syndrome  osteoporosis  Neuropathy      Completed abx on 03/28. Watch off abx  Status post  debridement 03/26  c/w Midodrine 10 mg po TID  Chronic normocytic Anemia -at baseline  Osteoporosis- continue with raloxifene & Vit D  Neuropathy- continue with gabapentin  Transaminitis-resolved    Progress note Handoff:   Pending: Completion of IV abx ,Placement  Discussion: Diagnosis, current management plan and further plan of care discussed with patient  and housestaff in morning  rounds.  Disposition: : Stable for discharge. Possible discharge Monday/Tuesday to group home

## 2021-03-29 NOTE — PROGRESS NOTE ADULT - SUBJECTIVE AND OBJECTIVE BOX
TEA ECHAVARRIA  55y, Female  Allergy: No Known Allergies      LOS  10d    CHIEF COMPLAINT: Hypotension and fever (29 Mar 2021 09:44)      INTERVAL EVENTS/HPI  - No acute events overnight  - T(F): , Max: 98.3 (03-29-21 @ 00:37)  - Tolerating medication  - WBC Count: 6.46 (03-28-21 @ 09:19)     - Creatinine, Serum: 0.8 (03-29-21 @ 06:09)  Creatinine, Serum: 0.7 (03-28-21 @ 09:19)       ROS  unable to obtain history secondary to patient's mental status    VITALS:  T(F): 95.6, Max: 98.3 (03-29-21 @ 00:37)  HR: 58  BP: 98/56  RR: 17Vital Signs Last 24 Hrs  T(C): 35.3 (29 Mar 2021 07:10), Max: 36.8 (29 Mar 2021 00:37)  T(F): 95.6 (29 Mar 2021 07:10), Max: 98.3 (29 Mar 2021 00:37)  HR: 58 (29 Mar 2021 07:10) (58 - 74)  BP: 98/56 (29 Mar 2021 07:10) (98/56 - 967/55)  BP(mean): --  RR: 17 (29 Mar 2021 07:10) (16 - 18)  SpO2: --    PHYSICAL EXAM:  Gen: NAD, resting in bed  HEENT: Normocephalic, atraumatic  Neck: supple, no lymphadenopathy  CV: Regular rate & regular rhythm  Lungs: decreased BS at bases, no fremitus  Abdomen: Soft, BS present  Ext: Warm, well perfused  Neuro: non focal, awake  Skin: no rash, no erythema; right hip with full thickness wound, no surrounding erythema; left foot without surrounding erythema   Lines: no phlebitis    FH: Non-contributory  Social Hx: Non-contributory    TESTS & MEASUREMENTS:                        9.5    6.46  )-----------( 458      ( 28 Mar 2021 09:19 )             31.1     03-29    136  |  99  |  15  ----------------------------<  102<H>  4.9   |  25  |  0.8    Ca    8.4<L>      29 Mar 2021 06:09    TPro  5.8<L>  /  Alb  2.6<L>  /  TBili  <0.2  /  DBili  x   /  AST  32  /  ALT  19  /  AlkPhos  81  03-29    eGFR if Non African American: 84 mL/min/1.73M2 (03-29-21 @ 06:09)  eGFR if African American: 97 mL/min/1.73M2 (03-29-21 @ 06:09)    LIVER FUNCTIONS - ( 29 Mar 2021 06:09 )  Alb: 2.6 g/dL / Pro: 5.8 g/dL / ALK PHOS: 81 U/L / ALT: 19 U/L / AST: 32 U/L / GGT: x               Culture - Acid Fast - Other w/Smear (collected 03-26-21 @ 10:30)  Source: .Other None    Culture - Surgical Swab (collected 03-26-21 @ 10:30)  Source: .Surgical Swab None  Gram Stain (03-27-21 @ 20:11):    Not routinely performed on swabs.  Final Report (03-28-21 @ 20:09):    Moderate Klebsiella pneumoniae (Carbapenem Resistant)    Few Enterococcus faecium (vancomycin resistant)    See previous culture [id number]    Culture - Acid Fast - Other w/Smear (collected 03-26-21 @ 10:17)  Source: .Other None    Culture - Surgical Swab (collected 03-26-21 @ 10:17)  Source: .Surgical Swab None  Gram Stain (03-27-21 @ 20:22):    Not routinely performed on swabs.  Preliminary Report (03-28-21 @ 19:27):    Moderate Acinetobacter baumannii/nosocom group (Carbapenem Resistant)    Few Proteus mirabilis    Moderate Enterococcus faecium (vancomycin resistant)  Organism: Acinetobacter baumannii/nosocom group (Carbapenem Resistant)  Acinetobacter baumannii/nosocom group (Carbapenem Resistant)  Proteus mirabilis  Enterococcus faecium (vancomycin resistant) (03-28-21 @ 19:25)  Organism: Acinetobacter baumannii/nosocom group (Carbapenem Resistant) (03-28-21 @ 19:25)      -  Imipenem: R      -  Piperacillin/Tazobactam: R      Method Type: KB  Organism: Acinetobacter baumannii/nosocom group (Carbapenem Resistant) (03-28-21 @ 19:25)      -  Amikacin: S <=16      -  Ampicillin/Sulbactam: R >16/8      -  Cefepime: R >16      -  Ceftazidime: S 4      -  Ciprofloxacin: R >2      -  Gentamicin: R >8      -  Levofloxacin: R >4      -  Meropenem: R >8      -  Tobramycin: S 4      -  Trimethoprim/Sulfamethoxazole: R >2/38      Method Type: ZANE  Organism: Enterococcus faecium (vancomycin resistant) (03-28-21 @ 19:25)      -  Ampicillin: R >8 Predicts results to ampicillin/sulbactam, amoxacillin-clavulanate and  piperacillin-tazobactam.      -  Daptomycin: SDD 4 The breakpoint for SDD (sensitive dose dependent)is based on a dosage regimen of 8-12 mg/kg administered every 24 h in adults and is intended for serious infections due to E. faecium. Consultation with an infectious diseases specialist is recommended.      -  Levofloxacin: R >4      -  Linezolid: S 1      -  Tetra/Doxy: R >8      -  Vancomycin: R >16      Method Type: ZANE  Organism: Proteus mirabilis (03-28-21 @ 19:25)      -  Amikacin: S <=16      -  Amoxicillin/Clavulanic Acid: S <=8/4      -  Ampicillin: S <=8 These ampicillin results predict results for amoxicillin      -  Ampicillin/Sulbactam: S <=4/2 Enterobacter, Citrobacter, and Serratia may develop resistance during prolonged therapy (3-4 days)      -  Aztreonam: S <=4      -  Cefazolin: I 4 Enterobacter, Citrobacter, and Serratia may develop resistance during prolonged therapy (3-4 days)      -  Cefepime: S <=2      -  Cefoxitin: S <=8      -  Ceftriaxone: S <=1 Enterobacter, Citrobacter, and Serratia may develop resistance during prolonged therapy      -  Ciprofloxacin: S <=0.25      -  Ertapenem: S <=0.5      -  Gentamicin: S <=2      -  Levofloxacin: S <=0.5      -  Meropenem: S <=1      -  Piperacillin/Tazobactam: S <=8      -  Tobramycin: S <=2      -  Trimethoprim/Sulfamethoxazole: S <=0.5/9.5      Method Type: ZANE    Culture - Acid Fast - Other w/Smear (collected 03-26-21 @ 10:15)  Source: .Other None    Culture - Surgical Swab (collected 03-26-21 @ 10:15)  Source: .Surgical Swab None  Gram Stain (03-27-21 @ 20:06):    Not routinely performed on swabs.  Final Report (03-28-21 @ 20:46):    After additional incubation time, Culture yields >4 types of aerobic    and/or anaerobic bacteria    Call client services within 7 days if further workup is clinically    indicated. Culture includes    Moderate Klebsiella pneumoniae (Carbapenem Resistant)    Few Acinetobacter baumannii/nosocom group (Carbapenem Resistant)    Few Enterococcus faecium (vancomycin resistant)    Rare Escherichia coli "Susceptibilities not performed"    Moderate Proteus mirabilis "Susceptibilities not performed"    Rare Staphylococcus epidermidis "Susceptibilities not performed"  Organism: Klepne MDRO  Acinetobacter baumannii/nosocom group (Carbapenem Resistant)  Acinetobacter baumannii/nosocom group (Carbapenem Resistant)  Enterococcus faecium (vancomycin resistant) (03-28-21 @ 20:46)  Organism: Enterococcus faecium (vancomycin resistant) (03-28-21 @ 20:46)      -  Ampicillin: R >8 Predicts results to ampicillin/sulbactam, amoxacillin-clavulanate and  piperacillin-tazobactam.      -  Daptomycin: SDD 4 The breakpoint for SDD (sensitive dose dependent)is based on a dosage regimen of 8-12 mg/kg administered every 24 h in adults and is intended for serious infections due to E. faecium. Consultation with an infectious diseases specialist is recommended.      -  Levofloxacin: R >4      -  Linezolid: S 2      -  Tetra/Doxy: R >8      -  Vancomycin: R >16      Method Type: ZANE  Organism: Acinetobacter baumannii/nosocom group (Carbapenem Resistant) (03-28-21 @ 20:46)      -  Imipenem: R      -  Piperacillin/Tazobactam: R      Method Type: KB  Organism: Acinetobacter baumannii/nosocom group (Carbapenem Resistant) (03-28-21 @ 20:46)      -  Amikacin: S <=16      -  Ampicillin/Sulbactam: I 16/8      -  Cefepime: R >16      -  Ceftazidime: S 4      -  Ciprofloxacin: R >2      -  Gentamicin: R >8      -  Levofloxacin: R >4      -  Meropenem: R >8      -  Tobramycin: I 8      -  Trimethoprim/Sulfamethoxazole: R >2/38      Method Type: ZANE  Organism: Klepne MDRO (03-28-21 @ 20:46)      -  Amikacin: R >32      -  Amoxicillin/Clavulanic Acid: R >16/8      -  Ampicillin: R >16 These ampicillin results predict results for amoxicillin      -  Ampicillin/Sulbactam: R >16/8 Enterobacter, Citrobacter, and Serratia may develop resistance during prolonged therapy (3-4 days)      -  Aztreonam: R >16      -  Cefazolin: R >16 Enterobacter, Citrobacter, and Serratia may develop resistance during prolonged therapy (3-4 days)      -  Cefepime: R >16      -  Cefoxitin: R >16      -  Ceftazidime/Avibactam: S <=4      -  Ceftolozane/tazobactam: R >8      -  Ceftriaxone: R >32 Enterobacter, Citrobacter, and Serratia may develop resistance during prolonged therapy      -  Ciprofloxacin: R >2      -  Ertapenem: R >1      -  Gentamicin: R >8      -  Imipenem: R 4      -  Levofloxacin: R >4      -  Meropenem: R 8      -  Piperacillin/Tazobactam: R >64      -  Tobramycin: R >8      -  Trimethoprim/Sulfamethoxazole: R >2/38      Method Type: ZANE    Culture - Blood (collected 03-20-21 @ 06:05)  Source: .Blood None  Final Report (03-25-21 @ 18:00):    No Growth Final    Culture - Other (collected 03-19-21 @ 02:30)  Source: .Other right hip wound  Final Report (03-24-21 @ 16:56):    Culture yields growth of greater than 3 colony types of    bacteria,  which may indicate contamination and normal daniele    Call client services within 7 days if further workup is clinically    indicated. Culture includes    Moderate Pseudomonas aeruginosa (Carbapenem Resistant)  Organism: Pseudomonas aeruginosa (Carbapenem Resistant) (03-24-21 @ 16:56)  Organism: Pseudomonas aeruginosa (Carbapenem Resistant) (03-24-21 @ 16:56)      -  Amikacin: S <=16      -  Aztreonam: S <=4      -  Cefepime: S <=2      -  Ceftazidime: S <=1      -  Ciprofloxacin: S <=0.25      -  Gentamicin: S <=2      -  Imipenem: R >8      -  Levofloxacin: S <=0.5      -  Meropenem: R 8      -  Piperacillin/Tazobactam: S <=8      -  Tobramycin: S <=2      Method Type: ZANE    Culture - Blood (collected 03-19-21 @ 02:30)  Source: .Blood Blood  Final Report (03-24-21 @ 14:00):    No Growth Final    Culture - Blood (collected 03-19-21 @ 02:30)  Source: .Blood Blood  Final Report (03-24-21 @ 14:00):    No Growth Final            INFECTIOUS DISEASES TESTING  COVID-19 PCR: NotDetec (03-25-21 @ 11:20)  Fungitell: <31 (03-20-21 @ 06:05)  MRSA PCR Result.: Negative (03-19-21 @ 14:00)  COVID-19 PCR: NotDetec (03-19-21 @ 02:30)      INFLAMMATORY MARKERS      RADIOLOGY & ADDITIONAL TESTS:  I have personally reviewed the last available Chest xray  CXR      CT      CARDIOLOGY TESTING  12 Lead ECG:   Ventricular Rate 94 BPM    Atrial Rate 94 BPM    P-R Interval 140 ms    QRS Duration 60 ms    Q-T Interval 348 ms    QTC Calculation(Bazett) 435 ms    P Axis 51 degrees    R Axis 46 degrees    T Axis 54 degrees    Diagnosis Line Normal sinus rhythm  Normal ECG    Confirmed by ROSA RENEE MD ([id number]) on 3/19/2021 6:38:52 AM (03-19-21 @ 02:22)      MEDICATIONS  ascorbic acid 500 Oral daily  chlorhexidine 4% Liquid 1 Topical <User Schedule>  cholecalciferol 2000 Oral daily  collagenase Ointment 1 Topical two times a day  Dakins Solution - 1/2 Strength 1 Topical two times a day  enoxaparin Injectable 40 SubCutaneous daily  gabapentin 600 Oral daily  melatonin 5 Oral at bedtime  midodrine. 10 Oral three times a day  multivitamin 1 Oral daily  raloxifene 60 Oral daily      WEIGHT  Weight (kg): 47.6 (03-26-21 @ 09:38)  Creatinine, Serum: 0.8 mg/dL (03-29-21 @ 06:09)      ANTIBIOTICS:      All available historical records have been reviewed

## 2021-03-30 LAB
ALBUMIN SERPL ELPH-MCNC: 2.6 G/DL — LOW (ref 3.5–5.2)
ALP SERPL-CCNC: 81 U/L — SIGNIFICANT CHANGE UP (ref 30–115)
ALT FLD-CCNC: 18 U/L — SIGNIFICANT CHANGE UP (ref 0–41)
ANION GAP SERPL CALC-SCNC: 14 MMOL/L — SIGNIFICANT CHANGE UP (ref 7–14)
AST SERPL-CCNC: 26 U/L — SIGNIFICANT CHANGE UP (ref 0–41)
BASOPHILS # BLD AUTO: 0.09 K/UL — SIGNIFICANT CHANGE UP (ref 0–0.2)
BASOPHILS NFR BLD AUTO: 1.2 % — HIGH (ref 0–1)
BILIRUB SERPL-MCNC: <0.2 MG/DL — SIGNIFICANT CHANGE UP (ref 0.2–1.2)
BUN SERPL-MCNC: 11 MG/DL — SIGNIFICANT CHANGE UP (ref 10–20)
CALCIUM SERPL-MCNC: 8.6 MG/DL — SIGNIFICANT CHANGE UP (ref 8.5–10.1)
CHLORIDE SERPL-SCNC: 100 MMOL/L — SIGNIFICANT CHANGE UP (ref 98–110)
CO2 SERPL-SCNC: 23 MMOL/L — SIGNIFICANT CHANGE UP (ref 17–32)
CREAT SERPL-MCNC: 0.6 MG/DL — LOW (ref 0.7–1.5)
EOSINOPHIL # BLD AUTO: 0.13 K/UL — SIGNIFICANT CHANGE UP (ref 0–0.7)
EOSINOPHIL NFR BLD AUTO: 1.7 % — SIGNIFICANT CHANGE UP (ref 0–8)
GLUCOSE SERPL-MCNC: 104 MG/DL — HIGH (ref 70–99)
HCT VFR BLD CALC: 37.1 % — SIGNIFICANT CHANGE UP (ref 37–47)
HGB BLD-MCNC: 11.5 G/DL — LOW (ref 12–16)
IMM GRANULOCYTES NFR BLD AUTO: 0.3 % — SIGNIFICANT CHANGE UP (ref 0.1–0.3)
LYMPHOCYTES # BLD AUTO: 2.01 K/UL — SIGNIFICANT CHANGE UP (ref 1.2–3.4)
LYMPHOCYTES # BLD AUTO: 26.4 % — SIGNIFICANT CHANGE UP (ref 20.5–51.1)
MAGNESIUM SERPL-MCNC: 2.1 MG/DL — SIGNIFICANT CHANGE UP (ref 1.8–2.4)
MCHC RBC-ENTMCNC: 26.9 PG — LOW (ref 27–31)
MCHC RBC-ENTMCNC: 31 G/DL — LOW (ref 32–37)
MCV RBC AUTO: 86.9 FL — SIGNIFICANT CHANGE UP (ref 81–99)
MONOCYTES # BLD AUTO: 0.79 K/UL — HIGH (ref 0.1–0.6)
MONOCYTES NFR BLD AUTO: 10.4 % — HIGH (ref 1.7–9.3)
NEUTROPHILS # BLD AUTO: 4.57 K/UL — SIGNIFICANT CHANGE UP (ref 1.4–6.5)
NEUTROPHILS NFR BLD AUTO: 60 % — SIGNIFICANT CHANGE UP (ref 42.2–75.2)
NRBC # BLD: 0 /100 WBCS — SIGNIFICANT CHANGE UP (ref 0–0)
PLATELET # BLD AUTO: 299 K/UL — SIGNIFICANT CHANGE UP (ref 130–400)
POTASSIUM SERPL-MCNC: 5 MMOL/L — SIGNIFICANT CHANGE UP (ref 3.5–5)
POTASSIUM SERPL-SCNC: 5 MMOL/L — SIGNIFICANT CHANGE UP (ref 3.5–5)
PROT SERPL-MCNC: 5.7 G/DL — LOW (ref 6–8)
RBC # BLD: 4.27 M/UL — SIGNIFICANT CHANGE UP (ref 4.2–5.4)
RBC # FLD: 16.6 % — HIGH (ref 11.5–14.5)
SARS-COV-2 RNA SPEC QL NAA+PROBE: SIGNIFICANT CHANGE UP
SODIUM SERPL-SCNC: 137 MMOL/L — SIGNIFICANT CHANGE UP (ref 135–146)
WBC # BLD: 7.61 K/UL — SIGNIFICANT CHANGE UP (ref 4.8–10.8)
WBC # FLD AUTO: 7.61 K/UL — SIGNIFICANT CHANGE UP (ref 4.8–10.8)

## 2021-03-30 PROCEDURE — 99232 SBSQ HOSP IP/OBS MODERATE 35: CPT

## 2021-03-30 PROCEDURE — 99231 SBSQ HOSP IP/OBS SF/LOW 25: CPT

## 2021-03-30 RX ORDER — HYDROMORPHONE HYDROCHLORIDE 2 MG/ML
0.25 INJECTION INTRAMUSCULAR; INTRAVENOUS; SUBCUTANEOUS ONCE
Refills: 0 | Status: DISCONTINUED | OUTPATIENT
Start: 2021-03-30 | End: 2021-03-30

## 2021-03-30 RX ADMIN — Medication 1 APPLICATION(S): at 17:08

## 2021-03-30 RX ADMIN — Medication 1 APPLICATION(S): at 05:41

## 2021-03-30 RX ADMIN — MIDODRINE HYDROCHLORIDE 10 MILLIGRAM(S): 2.5 TABLET ORAL at 11:20

## 2021-03-30 RX ADMIN — HYDROMORPHONE HYDROCHLORIDE 0.25 MILLIGRAM(S): 2 INJECTION INTRAMUSCULAR; INTRAVENOUS; SUBCUTANEOUS at 09:14

## 2021-03-30 RX ADMIN — Medication 5 MILLIGRAM(S): at 22:13

## 2021-03-30 RX ADMIN — RALOXIFENE HYDROCHLORIDE 60 MILLIGRAM(S): 60 TABLET, COATED ORAL at 11:20

## 2021-03-30 RX ADMIN — CHLORHEXIDINE GLUCONATE 1 APPLICATION(S): 213 SOLUTION TOPICAL at 05:40

## 2021-03-30 RX ADMIN — HYDROMORPHONE HYDROCHLORIDE 0.25 MILLIGRAM(S): 2 INJECTION INTRAMUSCULAR; INTRAVENOUS; SUBCUTANEOUS at 09:15

## 2021-03-30 RX ADMIN — GABAPENTIN 600 MILLIGRAM(S): 400 CAPSULE ORAL at 11:20

## 2021-03-30 RX ADMIN — MIDODRINE HYDROCHLORIDE 10 MILLIGRAM(S): 2.5 TABLET ORAL at 17:08

## 2021-03-30 RX ADMIN — ENOXAPARIN SODIUM 40 MILLIGRAM(S): 100 INJECTION SUBCUTANEOUS at 11:20

## 2021-03-30 RX ADMIN — Medication 2000 UNIT(S): at 11:20

## 2021-03-30 RX ADMIN — MIDODRINE HYDROCHLORIDE 10 MILLIGRAM(S): 2.5 TABLET ORAL at 05:42

## 2021-03-30 RX ADMIN — Medication 500 MILLIGRAM(S): at 11:20

## 2021-03-30 RX ADMIN — Medication 1 TABLET(S): at 11:20

## 2021-03-30 NOTE — BRIEF OPERATIVE NOTE - NSICDXBRIEFPREOP_GEN_ALL_CORE_FT
PRE-OP DIAGNOSIS:  Pressure sore 26-Mar-2021 11:13:05  Jose Linton  
PRE-OP DIAGNOSIS:  Pressure sore 26-Mar-2021 11:13:05  Jose Linton

## 2021-03-30 NOTE — PROGRESS NOTE ADULT - ATTENDING COMMENTS
Agree with note    Blister should not be removed     follow up in clinic once discharged
dressing change--> viable tissue feet and hip pressor sore--> nonviable tissue foot debrided--. > local wound care
right hip pressure with residual necrotic tissue post debridement --> local wound care, further debridement this week

## 2021-03-30 NOTE — BRIEF OPERATIVE NOTE - NSICDXBRIEFPROCEDURE_GEN_ALL_CORE_FT
PROCEDURES:  Debridement, pressure ulcer, hip, trochanter 26-Mar-2021 11:12:43 right hip/ left foot Jose Linton  Selective debridement of wound 30-Mar-2021 13:10:48 bedside right plantar foot Jose Linton  
PROCEDURES:  Debridement, pressure ulcer, hip, trochanter 26-Mar-2021 11:12:43 right hip/ left foot Jose Linton

## 2021-03-30 NOTE — PROGRESS NOTE ADULT - SUBJECTIVE AND OBJECTIVE BOX
Progress Note:  Provider Speciality                            Hospitalist      TEA ECHAVARRIA MRN-[id number] 55y Female     CHIEF PRESENTING COMPLAINT:  Patient is a 55y old  Female who presents with a chief complaint of Hypotension and fever (25 Mar 2021 14:16)        SUBJECTIVE:  Patient was seen and examined at bedside. Review of systems could not be obtained in this patient.    No significant overnight events reported.     HISTORY OF PRESENTING ILLNESS:  HPI:  54 yo female hx of Down syndrome, osteoporosis, neuropathy, mild anemia, and recent decubitus ulcer to right hip s/p debridement on 3/2 at NH sent from [facility] for hypotension (66/44) and fever (unknown temperature). As per NH paper, patient was taking Vanco and danna for decubitus ulcer and recently developed more wounds to right foot and left foot/heel. Patient baseline non-verbal and unable to give history. Called NH and was told that patient had a PICC on R and was supposed to finish Vanc and Danna on 3/22. Last Vanc trough was 16.7 on 3/17.      In the ED T 98.6  /50 RR 20 O2% 99 on 3L NC. 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 Labs were significant for WBC 7.27, Hb 10.2 AST/ALT 53/96. EKG normal. Covid negative. Chest xray shows R base opacity concerning for infection. R foot xray shows fracture of the tuft of the R distal phalanx, diffuse swelling along the dorsum of the foot. Vanc and danna was given. (19 Mar 2021 09:40)        REVIEW OF SYSTEMS:  Review of systems could not be obtained in this patient.     PAST MEDICAL & SURGICAL HISTORY:  PAST MEDICAL & SURGICAL HISTORY:  Neuropathy    Mild anemia    Osteoporosis    Down syndrome    S/P debridement  of R hip on 3/2/21            VITAL SIGNS:  Vital Signs Last 24 Hrs  T(C): 36.8 (30 Mar 2021 07:56), Max: 36.8 (30 Mar 2021 07:56)  T(F): 98.2 (30 Mar 2021 07:56), Max: 98.2 (30 Mar 2021 07:56)  HR: 75 (30 Mar 2021 07:56) (64 - 78)  BP: 97/56 (30 Mar 2021 07:56) (89/50 - 103/56)  BP(mean): --  RR: 18 (30 Mar 2021 07:56) (18 - 18)  SpO2: --      PHYSICAL EXAMINATION:  Not in acute distress  General: No pallor, no icterus  HEENT:   EOMI, no JVD.  Heart: S1+S2 audible  Lungs: bilateral  fair air entry, no wheezing, no crepitations.  Abdomen: Soft, non-tender, non-distended , no  rigidity or guarding.  CNS: Awake a, downs syndrome  Extremities:  No edema    Unstagable decubitus R hip, unstagable right foot blister, stage 2 left foot ulcer        CONSULTS:  Consultant(s) Notes Reviewed by me.   Care Discussed with Consultants/Other Providers where required.        MEDICATIONS:  MEDICATIONS  (STANDING):  ascorbic acid 500 milliGRAM(s) Oral daily  chlorhexidine 4% Liquid 1 Application(s) Topical <User Schedule>  cholecalciferol 2000 Unit(s) Oral daily  collagenase Ointment 1 Application(s) Topical two times a day  Dakins Solution - 1/2 Strength 1 Application(s) Topical two times a day  enoxaparin Injectable 40 milliGRAM(s) SubCutaneous daily  gabapentin 600 milliGRAM(s) Oral daily  lidocaine 1%/epinephrine 1:100,000 Inj 40 milliLiter(s) Local Injection once  melatonin 5 milliGRAM(s) Oral at bedtime  meropenem  IVPB 1000 milliGRAM(s) IV Intermittent every 24 hours  midodrine. 10 milliGRAM(s) Oral three times a day  multivitamin 1 Tablet(s) Oral daily  nystatin Cream 1 Application(s) Topical two times a day  raloxifene 60 milliGRAM(s) Oral daily    MEDICATIONS  (PRN):            ASSESSMENT:      54 yo female hx of Down syndrome, osteoporosis, neuropathy, mild anemia, and recent decubitus ulcer to right hip s/p debridement on 3/2 at NH sent from [facility] for hypotension (66/44) and fever.      Suspected Sepsis, prior to admission, possibly secondary to infcetd decubitus ulcer vs possible PNA  Downs syndrome  osteoporosis  Neuropathy      Completed abx on 03/28. Watch off abx  Status post  debridement 03/26 & today  c/w Midodrine 10 mg po TID  Chronic normocytic Anemia -at baseline  Osteoporosis- continue with raloxifene & Vit D  Neuropathy- continue with gabapentin  Transaminitis-resolved    Progress note Handoff:   Pending: Completion of IV abx ,Placement  Discussion: Diagnosis, current management plan and further plan of care discussed with patient  and housestaff in morning  rounds.  Disposition: : Stable for discharge to [facility], pending Auth

## 2021-03-30 NOTE — BRIEF OPERATIVE NOTE - NSICDXBRIEFPOSTOP_GEN_ALL_CORE_FT
POST-OP DIAGNOSIS:  Pressure sore 26-Mar-2021 11:13:17  Jose Linton  
POST-OP DIAGNOSIS:  Pressure sore 26-Mar-2021 11:13:17  Jsoe Linton

## 2021-03-30 NOTE — PROGRESS NOTE ADULT - SUBJECTIVE AND OBJECTIVE BOX
Patient is a 55y old  Female who presented hypotension and fever. Burn team saw pt at the bedside for wound evaluation. POD# 4 s/p debridement of right hip and left foot.       INTERVAL HPI/OVERNIGHT EVENTS:  ICU Vital Signs Last 24 Hrs  T(C): 36.5 (30 Mar 2021 17:07), Max: 36.8 (30 Mar 2021 07:56)  T(F): 97.7 (30 Mar 2021 17:07), Max: 98.2 (30 Mar 2021 07:56)  HR: 85 (30 Mar 2021 17:07) (64 - 85)  BP: 104/54 (30 Mar 2021 17:07) (89/50 - 104/54)  RR: 18 (30 Mar 2021 17:07) (18 - 18)              LABS:                        11.5   7.61  )-----------( 299      ( 30 Mar 2021 08:10 )             37.1         PE:    Full thickness wounds of right hip clean, pink, with serous discharge and granulation tissue. Left foot clean with yellow exudates, pink tissue and serous discharge present as well. No signs of infection, no bleeding.

## 2021-03-30 NOTE — PROGRESS NOTE ADULT - ASSESSMENT
Pt is a 54 yo M with full thickness wounds of right hip and left foot  -Continue current wound care: santyl/dakins to hip wound, gentamicin/xeroform/kerlix to left foot  -Frequent turning, positioning, off loading  -Prompt attention to incontinence

## 2021-03-30 NOTE — PROGRESS NOTE ADULT - SUBJECTIVE AND OBJECTIVE BOX
Patient is a 55y old  Female who presents with a chief complaint of Hypotension and fever (19 Mar 2021 20:55)      OVERNIGHT EVENTS: Hospital day 11. Patient seen and examined at bedside. Patient does not appear to be in any acute distress, lying comfortably on bed. Patient is medically optimized. No acute overnight event.     REVIEW OF SYSTEMS:  Patient nonverbal. Could not assess.    FAMILY HISTORY:  No pertinent family history       Vital Signs Last 24 Hrs  T(C): 36.8 (30 Mar 2021 07:56), Max: 36.9 (29 Mar 2021 15:22)  T(F): 98.2 (30 Mar 2021 07:56), Max: 98.4 (29 Mar 2021 15:22)  HR: 75 (30 Mar 2021 07:56) (64 - 78)  BP: 97/56 (30 Mar 2021 07:56) (89/50 - 109/55)  BP(mean): --  RR: 18 (30 Mar 2021 07:56) (16 - 18)  SpO2: --    PHYSICAL EXAM:  GENERAL: NAD, nonverbal, not in acute distress, lying comfortably on bed  HEAD:  Atraumatic, Normocephalic  CHEST/LUNG: Clear to percussion bilaterally; No rales, rhonchi, wheezing, or rubs  HEART: RRR; No murmurs, rubs, or gallops  ABDOMEN: Soft, Nontender, Nondistended; Bowel sounds present  EXTREMITIES:  No clubbing, cyanosis, or edema  SKIN: Right hip full thickness wound about 9cm x 9cm and , s/p debridement, serosanguineous discharge, no acute bleeding, no pus draining.   Right plantar 7cm x 6cm blister, lanced and drained devitalized skin pink/red, no edema, no erythema, no bleeding, no infection   Left medial heel with 4cm x4cm ruptured blister revealing pink ulcer ; devitalized skin   pink/red, no edema, no erythema, no bleeding, no infection.   small wounds - desiccated  ulcers bilateral great toes     Consultant(s) Notes Reviewed:  [x ] YES  [ ] NO  Care Discussed with Consultants/Other Providers [ x] YES  [ ] NO        LABS:                          11.5   7.61  )-----------( 299      ( 30 Mar 2021 08:10 )             37.1     03-29    136  |  99  |  15  ----------------------------<  102<H>  4.9   |  25  |  0.8    Ca    8.4<L>      29 Mar 2021 06:09    TPro  5.8<L>  /  Alb  2.6<L>  /  TBili  <0.2  /  DBili  x   /  AST  32  /  ALT  19  /  AlkPhos  81  03-29             RADIOLOGY & ADDITIONAL TESTS:      < from: Xray Chest 1 View- PORTABLE-Urgent (Xray Chest 1 View- PORTABLE-Urgent .) (03.19.21 @ 01:53) >  Impression:    Right base opacity and a left retrocardiac opacity. Findings may be infectious in nature. Clinical correlation is recommended. Follow to resolution is necessary to exclude underlying process    < end of copied text >      < from: Xray Foot AP + Lateral, Right (03.19.21 @ 02:59) >    Impression    Likely fracture of the tuft of the distal second phalanx.    Diffuse soft tissue swelling, especially along the dorsum of the foot      < end of copied text >    MEDICATIONS  (STANDING):  ascorbic acid 500 milliGRAM(s) Oral daily  chlorhexidine 4% Liquid 1 Application(s) Topical <User Schedule>  cholecalciferol 2000 Unit(s) Oral daily  collagenase Ointment 1 Application(s) Topical two times a day  Dakins Solution - 1/2 Strength 1 Application(s) Topical two times a day  enoxaparin Injectable 40 milliGRAM(s) SubCutaneous daily  gabapentin 600 milliGRAM(s) Oral daily  melatonin 5 milliGRAM(s) Oral at bedtime  midodrine. 10 milliGRAM(s) Oral three times a day  multivitamin 1 Tablet(s) Oral daily  raloxifene 60 milliGRAM(s) Oral daily        Imaging Personally Reviewed:  [] YES  [ ] NO        Assessment/Plan:    56 yo female hx of Down syndrome, osteoporosis, neuropathy, mild anemia, and recent decubitus ulcer to right hip s/p debridement on 3/2 at NH sent from [facility] for hypotension (66/44) and fever.    # Fevers/Hypotension- Multiple ulcers R hip  PNA vs ulcer vs UTI vs PICC line (improved)  -Chest xray shows R base opacity concerning for infection  -was on danna and vanc from 3/15 to be completed 3/22 via PICC line  -Last vanc trough 16.7 on 3/17  -PICC line removed  -WBC 6.18  - BCx negative  - fungitell negative  -Stage 3 DU on R hip, unstagable right foot blister, stage 2 left foot ulcer, stage 3 left foot blister   - Burn on board - bedside debridement; consent received, s/p bedside debridement  3/23/21, s/p debridement OR today  3/26/21, no further debridement necessary   - Podiatry on board- rt. foot blister lanced and drained  - c/w wound care as per podiatry   - c/w wound care as per Burn  - ID rec: monitor off antibiotics as local wound care is mainstay of treatment; pt. currently remains afebrile without leukocytosis   - monitor BP, current 98/56, s/p OR debridement, s/p LR 75cc (back to baseline)   - c/w Midodrine 10 mg po TID      # Anemia (improved/ back to baseline)  -Hb  11.5  -As per NH Hb 10.7 on 3/15    # Osteoporosis  - continue with raloxifene  - continue with Vit D    # Neuropathy  - continue with gabapentin    # Transaminitis (resolved)  AST/ALT 17/31      Diet: Dysphagia 1 until S&S  Activity: WBAT w/ DARCO shoe   DVT ppx: Lovenox  GI ppx: Not indicated  Code Status: Full  Disposition: From [facility]    Discharge pending today, pending placement; COVID swab negative .

## 2021-03-31 PROCEDURE — 99232 SBSQ HOSP IP/OBS MODERATE 35: CPT

## 2021-03-31 RX ADMIN — Medication 2000 UNIT(S): at 11:16

## 2021-03-31 RX ADMIN — Medication 1 APPLICATION(S): at 17:25

## 2021-03-31 RX ADMIN — Medication 1 APPLICATION(S): at 08:00

## 2021-03-31 RX ADMIN — Medication 1 APPLICATION(S): at 17:26

## 2021-03-31 RX ADMIN — RALOXIFENE HYDROCHLORIDE 60 MILLIGRAM(S): 60 TABLET, COATED ORAL at 11:17

## 2021-03-31 RX ADMIN — Medication 1 TABLET(S): at 11:16

## 2021-03-31 RX ADMIN — GABAPENTIN 600 MILLIGRAM(S): 400 CAPSULE ORAL at 11:17

## 2021-03-31 RX ADMIN — Medication 5 MILLIGRAM(S): at 21:46

## 2021-03-31 RX ADMIN — MIDODRINE HYDROCHLORIDE 10 MILLIGRAM(S): 2.5 TABLET ORAL at 17:25

## 2021-03-31 RX ADMIN — MIDODRINE HYDROCHLORIDE 10 MILLIGRAM(S): 2.5 TABLET ORAL at 11:17

## 2021-03-31 RX ADMIN — MIDODRINE HYDROCHLORIDE 10 MILLIGRAM(S): 2.5 TABLET ORAL at 06:23

## 2021-03-31 RX ADMIN — Medication 500 MILLIGRAM(S): at 11:16

## 2021-03-31 RX ADMIN — ENOXAPARIN SODIUM 40 MILLIGRAM(S): 100 INJECTION SUBCUTANEOUS at 11:17

## 2021-03-31 RX ADMIN — CHLORHEXIDINE GLUCONATE 1 APPLICATION(S): 213 SOLUTION TOPICAL at 06:23

## 2021-03-31 NOTE — PROGRESS NOTE ADULT - ASSESSMENT
54 yo female hx of Down syndrome, osteoporosis, neuropathy, mild anemia, and recent decubitus ulcer to right hip s/p debridement on 3/2 at NH sent from [facility] for hypotension (66/44) and fever.    # Suspected sepsis POA likely due to infected decubitus ulcers - sepssi resolved  - s/p debridement at bedside 3/30 and in OR 3/26 by burn   - Wound Cx from 3/26 multiple MDRO: Proteus mirabilis, Acinetobacter baumannii/nosocom group (Carbapenem Resistant), Enterococcus faecium (vancomycin resistant)   - pt was receiving Vancomycin and Meropenem  - as per ID no need for ABx, cont wound care  - pt remains afebrile, no leucocytosis   - frequent turns, offloading of pressure points    # Hypotension - cont Midodrine 10 mg TID, monitor BP    # Chronic normocytic Anemia - Hb at baseline  # Down syndrome  # Osteoporosis- cont Raloxifene and Vit D  # Neuropathy - cont Gabapentin    Pt is clinically stable for discharge to SNF, awaiting auth.

## 2021-03-31 NOTE — PROGRESS NOTE ADULT - TIME BILLING
coordination of care, direct pt care
Discussed with primary team
Discussed with primary team
Wound eval and treat
dressing change , wound eval and treat
wound eval and treat

## 2021-03-31 NOTE — PROGRESS NOTE ADULT - SUBJECTIVE AND OBJECTIVE BOX
TEA ECHAVARRIA    Patient is a 55y old  Female who presents with a chief complaint of Hypotension and fever (31 Mar 2021 11:38)    INTERVAL HPI/OVERNIGHT EVENTS: no events overnight reported. Pt remains afebrile, hemodynamically stable.     ROS: pt is nonverbal, unable to obtain ROS    PHYSICAL EXAM:  T(C): 36.7, Max: 36.7 (03-31-21 @ 08:00)  HR: 80 (72 - 85)  BP: 110/60 (104/54 - 110/60)  RR: 18 (18 - 18)  SpO2: 96% (96% - 96%)    GENERAL: NAD, Down syndrome features, laying mostly on left side  PULMONARY/CHEST: No rales, rhonchi, or wheezing  CARDIOVASC: Regular rate and rhythm;   GI/ABDOMEN: Soft, Nontender, Nondistended; Bowel sounds present  EXTREMITIES: contracted, no leg edema   SKIN: right hip wound, left foot wound, no discharge  NERVOUS SYSTEM: awake, does not follow commands, traces with eyes, opens eyes on voice     LABS:                        11.5   7.61  )-----------( 299      ( 30 Mar 2021 08:10 )             37.1     03-30    137  |  100  |  11  ----------------------------<  104<H>  5.0   |  23  |  0.6<L>    Ca    8.6      30 Mar 2021 08:10  Mg     2.1     03-30    TPro  5.7<L>  /  Alb  2.6<L>  /  TBili  <0.2  /  DBili  x   /  AST  26  /  ALT  18  /  AlkPhos  81  03-30        MEDICATIONS  (STANDING):  ascorbic acid 500 milliGRAM(s) Oral daily  chlorhexidine 4% Liquid 1 Application(s) Topical <User Schedule>  cholecalciferol 2000 Unit(s) Oral daily  collagenase Ointment 1 Application(s) Topical two times a day  Dakins Solution - 1/2 Strength 1 Application(s) Topical two times a day  enoxaparin Injectable 40 milliGRAM(s) SubCutaneous daily  gabapentin 600 milliGRAM(s) Oral daily  melatonin 5 milliGRAM(s) Oral at bedtime  midodrine. 10 milliGRAM(s) Oral three times a day  multivitamin 1 Tablet(s) Oral daily  raloxifene 60 milliGRAM(s) Oral daily    MEDICATIONS  (PRN):

## 2021-03-31 NOTE — PROGRESS NOTE ADULT - SUBJECTIVE AND OBJECTIVE BOX
Patient is a 55y old  Female who presents with a chief complaint of Hypotension and fever (19 Mar 2021 20:55)      OVERNIGHT EVENTS: Hospital day 12. Patient seen and examined at bedside. Patient does not appear to be in any acute distress, lying comfortably on bed. Patient is medically optimized. No acute overnight event.     REVIEW OF SYSTEMS:  Patient nonverbal. Could not assess.    FAMILY HISTORY:  No pertinent family history       Vital Signs Last 24 Hrs  T(C): 36.7 (31 Mar 2021 08:00), Max: 36.7 (31 Mar 2021 08:00)  T(F): 98 (31 Mar 2021 08:00), Max: 98 (31 Mar 2021 08:00)  HR: 80 (31 Mar 2021 08:00) (72 - 85)  BP: 110/60 (31 Mar 2021 08:00) (104/54 - 110/60)  BP(mean): --  RR: 18 (31 Mar 2021 08:00) (18 - 18)  SpO2: 96% (31 Mar 2021 08:00) (96% - 96%)    PHYSICAL EXAM:  GENERAL: NAD, nonverbal, not in acute distress, lying comfortably on bed  HEAD:  Atraumatic, Normocephalic  CHEST/LUNG: Clear to percussion bilaterally; No rales, rhonchi, wheezing, or rubs  HEART: RRR; No murmurs, rubs, or gallops  ABDOMEN: Soft, Nontender, Nondistended; Bowel sounds present  EXTREMITIES:  No clubbing, cyanosis, or edema  SKIN: Right hip full thickness wound about 9cm x 9cm and , s/p debridement, serosanguineous discharge, no acute bleeding, no pus draining.   Right plantar 7cm x 6cm blister, lanced and drained devitalized skin pink/red, no edema, no erythema, no bleeding, no infection   Left medial heel with 4cm x4cm ruptured blister revealing pink ulcer ; devitalized skin   pink/red, no edema, no erythema, no bleeding, no infection.   small wounds - desiccated  ulcers bilateral great toes     Consultant(s) Notes Reviewed:  [x ] YES  [ ] NO  Care Discussed with Consultants/Other Providers [ x] YES  [ ] NO        LABS:                          11.5   7.61  )-----------( 299      ( 30 Mar 2021 08:10 )             37.1     03-29    136  |  99  |  15  ----------------------------<  102<H>  4.9   |  25  |  0.8    Ca    8.4<L>      29 Mar 2021 06:09    TPro  5.8<L>  /  Alb  2.6<L>  /  TBili  <0.2  /  DBili  x   /  AST  32  /  ALT  19  /  AlkPhos  81  03-29             RADIOLOGY & ADDITIONAL TESTS:      < from: Xray Chest 1 View- PORTABLE-Urgent (Xray Chest 1 View- PORTABLE-Urgent .) (03.19.21 @ 01:53) >  Impression:    Right base opacity and a left retrocardiac opacity. Findings may be infectious in nature. Clinical correlation is recommended. Follow to resolution is necessary to exclude underlying process    < end of copied text >      < from: Xray Foot AP + Lateral, Right (03.19.21 @ 02:59) >    Impression    Likely fracture of the tuft of the distal second phalanx.    Diffuse soft tissue swelling, especially along the dorsum of the foot      < end of copied text >    MEDICATIONS  (STANDING):  ascorbic acid 500 milliGRAM(s) Oral daily  chlorhexidine 4% Liquid 1 Application(s) Topical <User Schedule>  cholecalciferol 2000 Unit(s) Oral daily  collagenase Ointment 1 Application(s) Topical two times a day  Dakins Solution - 1/2 Strength 1 Application(s) Topical two times a day  enoxaparin Injectable 40 milliGRAM(s) SubCutaneous daily  gabapentin 600 milliGRAM(s) Oral daily  melatonin 5 milliGRAM(s) Oral at bedtime  midodrine. 10 milliGRAM(s) Oral three times a day  multivitamin 1 Tablet(s) Oral daily  raloxifene 60 milliGRAM(s) Oral daily      Imaging Personally Reviewed:  [] YES  [ ] NO        Assessment/Plan:    56 yo female hx of Down syndrome, osteoporosis, neuropathy, mild anemia, and recent decubitus ulcer to right hip s/p debridement on 3/2 at NH sent from [facility] for hypotension (66/44) and fever.    # Fevers/Hypotension- Multiple ulcers R hip  PNA vs ulcer vs UTI vs PICC line (resolved)  -Chest xray shows R base opacity concerning for infection  -was on danna and vanc from 3/15 to be completed 3/22 via PICC line  -Last vanc trough 16.7 on 3/17  -PICC line removed  -WBC 6.18  - BCx negative  - fungitell negative  -Stage 3 DU on R hip, unstagable right foot blister, stage 2 left foot ulcer, stage 3 left foot blister   - Burn on board - bedside debridement; consent received, s/p bedside debridement  3/23/21, s/p debridement OR today  3/26/21, no further debridement necessary   - Podiatry on board- rt. foot blister lanced and drained  - c/w wound care as per podiatry   - c/w wound care as per Burn  - ID rec: monitor off antibiotics as local wound care is mainstay of treatment; pt. currently remains afebrile without leukocytosis   - /60, (back to baseline)   - c/w Midodrine 10 mg po TID      # Anemia (back to baseline)  -Hb  11.5  -As per NH Hb 10.7 on 3/15    # Osteoporosis  - continue with raloxifene  - continue with Vit D    # Neuropathy  - continue with gabapentin    # Transaminitis (resolved)  AST/ALT 17/31      Diet: Dysphagia 1 until S&S  Activity: WBAT w/ DARCO shoe   DVT ppx: Lovenox  GI ppx: Not indicated  Code Status: Full  Disposition: From [facility]    Discharge pending today, pending authorization; COVID swab negative .

## 2021-03-31 NOTE — PROVIDER CONTACT NOTE (MEDICATION) - ACTION/TREATMENT ORDERED:
MD Palafox notified and is aware. MD Palafox stated "Do not do dressing change until pharmacy has Dakins 0.25 in stock"

## 2021-04-01 LAB — SARS-COV-2 RNA SPEC QL NAA+PROBE: SIGNIFICANT CHANGE UP

## 2021-04-01 PROCEDURE — 99232 SBSQ HOSP IP/OBS MODERATE 35: CPT

## 2021-04-01 RX ADMIN — Medication 2000 UNIT(S): at 11:01

## 2021-04-01 RX ADMIN — Medication 1 APPLICATION(S): at 17:04

## 2021-04-01 RX ADMIN — RALOXIFENE HYDROCHLORIDE 60 MILLIGRAM(S): 60 TABLET, COATED ORAL at 11:01

## 2021-04-01 RX ADMIN — MIDODRINE HYDROCHLORIDE 10 MILLIGRAM(S): 2.5 TABLET ORAL at 05:31

## 2021-04-01 RX ADMIN — CHLORHEXIDINE GLUCONATE 1 APPLICATION(S): 213 SOLUTION TOPICAL at 05:30

## 2021-04-01 RX ADMIN — MIDODRINE HYDROCHLORIDE 10 MILLIGRAM(S): 2.5 TABLET ORAL at 17:04

## 2021-04-01 RX ADMIN — Medication 1 APPLICATION(S): at 05:30

## 2021-04-01 RX ADMIN — Medication 1 APPLICATION(S): at 17:00

## 2021-04-01 RX ADMIN — Medication 1 APPLICATION(S): at 05:31

## 2021-04-01 RX ADMIN — ENOXAPARIN SODIUM 40 MILLIGRAM(S): 100 INJECTION SUBCUTANEOUS at 11:01

## 2021-04-01 RX ADMIN — MIDODRINE HYDROCHLORIDE 10 MILLIGRAM(S): 2.5 TABLET ORAL at 11:01

## 2021-04-01 RX ADMIN — Medication 500 MILLIGRAM(S): at 11:01

## 2021-04-01 RX ADMIN — Medication 1 TABLET(S): at 11:01

## 2021-04-01 RX ADMIN — GABAPENTIN 600 MILLIGRAM(S): 400 CAPSULE ORAL at 11:01

## 2021-04-01 RX ADMIN — Medication 5 MILLIGRAM(S): at 21:10

## 2021-04-01 NOTE — PROVIDER CONTACT NOTE (OTHER) - SITUATION
Pt. assessed. BP is 85/50, HR 74. 500 ml bolus of NS ordered and given. BP rechecked= 76/43, HR 76. MD Garcia aware. MD orders 10 mg midodrine for pt. Will give and reassess.
PT BP taken at 4 am, 133/67. Retaken now 98/55. Pt due for 10 mg midrodrine with morning medications/
Pt has debridement 3/26. Is it OK for RN to do wound care or does burn team want to do initial dressing? Also, Dressing order for right hip does not state to use Santyl, however santyl is ordered.
PT /56 HR 72
Pt /65 HR 84. Pt due for midodrine.
Pt dressing orders contraindicating. Should dressing for R hip have Santyl?
pt BP 89/50 HR 64
ptd BP 91/54 P: 60.
Upon changing pt dressing, R hip dressing noted to have xeroform. Are current orders incorrect? Clarifying again, is it OK for RN to do dressing change?

## 2021-04-01 NOTE — PROVIDER CONTACT NOTE (OTHER) - NAME OF MD/NP/PA/DO NOTIFIED:
MD Bull
MD CARROLL [id number]
Burn Gail
Burn Gail [phone]
MD Blul
MD Christiansen
Provider Gail Burn [phone]
MD Bull
MD Abraham

## 2021-04-01 NOTE — PROVIDER CONTACT NOTE (OTHER) - ACTION/TREATMENT ORDERED:
As per MD, give midodrine as ordered.
As per MD Abraham, give midrodrine with morning medications.
As per MD, no further intervention needed. Will continue to monitor.
As per MD, repeat BP in 30 minutes
As per Provider, use santyl with dressing to Right hip.
As per provider, cleanse wound, pack with xeroform, cover with abd pad and tape. Joelle Wolff per provider, it is ok for RN to change dressing now.
MD Christiansen made aware, came to assess pt. Will continue to monitor.
As per Provider Gail, RN can do first dressing changes. Additionally, provider states she will clarify order.

## 2021-04-01 NOTE — PROGRESS NOTE ADULT - SUBJECTIVE AND OBJECTIVE BOX
Patient is a 55y old  Female who presents with a chief complaint of Hypotension and fever (19 Mar 2021 20:55)      OVERNIGHT EVENTS: Hospital day 13. Patient seen and examined at bedside. Patient does not appear to be in any acute distress, lying comfortably on bed. Patient is medically optimized. No acute overnight event.     REVIEW OF SYSTEMS:  Patient nonverbal. Could not assess.    FAMILY HISTORY:  No pertinent family history       Vital Signs Last 24 Hrs  T(C): 35.9 (01 Apr 2021 07:27), Max: 36.8 (31 Mar 2021 16:00)  T(F): 96.6 (01 Apr 2021 07:27), Max: 98.3 (31 Mar 2021 16:00)  HR: 80 (01 Apr 2021 07:27) (80 - 93)  BP: 100/58 (01 Apr 2021 07:27) (100/58 - 123/72)  BP(mean): --  RR: 18 (01 Apr 2021 07:27) (18 - 18)  SpO2: 96% (31 Mar 2021 16:00) (96% - 96%)    PHYSICAL EXAM:  GENERAL: NAD, nonverbal, not in acute distress, lying comfortably on bed  HEAD:  Atraumatic, Normocephalic  CHEST/LUNG: Clear to percussion bilaterally; No rales, rhonchi, wheezing, or rubs  HEART: RRR; No murmurs, rubs, or gallops  ABDOMEN: Soft, Nontender, Nondistended; Bowel sounds present  EXTREMITIES:  No clubbing, cyanosis, or edema  SKIN: Right hip full thickness wound about 9cm x 9cm and , s/p debridement, serosanguineous discharge, no acute bleeding, no pus draining.   Right plantar 7cm x 6cm blister, lanced and drained devitalized skin pink/red, no edema, no erythema, no bleeding, no infection   Left medial heel with 4cm x4cm ruptured blister revealing pink ulcer ; devitalized skin   pink/red, no edema, no erythema, no bleeding, no infection.   small wounds - desiccated  ulcers bilateral great toes     Consultant(s) Notes Reviewed:  [x ] YES  [ ] NO  Care Discussed with Consultants/Other Providers [ x] YES  [ ] NO         LABS:                          11.2   6.58  )-----------( 327      ( 01 Apr 2021 08:03 )             35.6     04-01    141  |  101  |  7<L>  ----------------------------<  118<H>  4.7   |  30  |  0.7    Ca    8.6      01 Apr 2021 08:03    TPro  6.1  /  Alb  2.8<L>  /  TBili  <0.2  /  DBili  x   /  AST  18  /  ALT  14  /  AlkPhos  93  04-01           RADIOLOGY & ADDITIONAL TESTS:      < from: Xray Chest 1 View- PORTABLE-Urgent (Xray Chest 1 View- PORTABLE-Urgent .) (03.19.21 @ 01:53) >  Impression:    Right base opacity and a left retrocardiac opacity. Findings may be infectious in nature. Clinical correlation is recommended. Follow to resolution is necessary to exclude underlying process    < end of copied text >      < from: Xray Foot AP + Lateral, Right (03.19.21 @ 02:59) >    Impression    Likely fracture of the tuft of the distal second phalanx.    Diffuse soft tissue swelling, especially along the dorsum of the foot      < end of copied text >    MEDICATIONS  (STANDING):  ascorbic acid 500 milliGRAM(s) Oral daily  chlorhexidine 4% Liquid 1 Application(s) Topical <User Schedule>  cholecalciferol 2000 Unit(s) Oral daily  collagenase Ointment 1 Application(s) Topical two times a day  Dakins Solution - 1/2 Strength 1 Application(s) Topical two times a day  enoxaparin Injectable 40 milliGRAM(s) SubCutaneous daily  gabapentin 600 milliGRAM(s) Oral daily  melatonin 5 milliGRAM(s) Oral at bedtime  midodrine. 10 milliGRAM(s) Oral three times a day  multivitamin 1 Tablet(s) Oral daily  raloxifene 60 milliGRAM(s) Oral daily        Imaging Personally Reviewed:  [] YES  [ ] NO        Assessment/Plan:    54 yo female hx of Down syndrome, osteoporosis, neuropathy, mild anemia, and recent decubitus ulcer to right hip s/p debridement on 3/2 at NH sent from [facility] for hypotension (66/44) and fever.    # Fevers/Hypotension- Multiple ulcers R hip  PNA vs ulcer vs UTI vs PICC line (resolved)  -Chest xray shows R base opacity concerning for infection  -was on danna and vanc from 3/15 to be completed 3/22 via PICC line  -Last vanc trough 16.7 on 3/17  -PICC line removed  -WBC 6.18  - BCx negative  - fungitell negative  -Stage 3 DU on R hip, unstagable right foot blister, stage 2 left foot ulcer, stage 3 left foot blister   - Burn - s/p bedside debridement  3/23/21, s/p debridement OR 3/26/21, no further debridement necessary   - Podiatry - rt. foot blister lanced and drained  - c/w wound care as per podiatry   - c/w wound care as per Burn  - ID rec: monitor off antibiotics as local wound care is mainstay of treatment; pt. currently remains afebrile without leukocytosis   - /60, (back to baseline)   - c/w Midodrine 10 mg po TID      # Anemia (back to baseline)  -Hb  11.2  -As per NH Hb 10.7 on 3/15    # Osteoporosis  - continue with raloxifene  - continue with Vit D    # Neuropathy  - continue with gabapentin    # Transaminitis (resolved)  AST/ALT 17/31      Diet: Dysphagia 1 until S&S  Activity: WBAT w/ DARCO shoe   DVT ppx: Lovenox  GI ppx: Not indicated  Code Status: Full  Disposition: From [facility]    Discharge pending today, pending authorization; COVID pneding results .

## 2021-04-01 NOTE — PROGRESS NOTE ADULT - SUBJECTIVE AND OBJECTIVE BOX
TEA ECHAVARRIA    Patient is a 55y old  Female who presents with a chief complaint of Hypotension and fever (01 Apr 2021 08:48)    INTERVAL HPI/OVERNIGHT EVENTS: no events overnight.     ROS: unable to obtain, pt is non verbal    PHYSICAL EXAM:  T(C): 36.8, Max: 36.8 (04-01-21 @ 15:36)  HR: 91 (80 - 91)  BP: 106/50 (100/58 - 107/65)  RR: 18 (18 - 18)  SpO2: --    GENERAL: NAD, Down syndrome features, laying mostly on left side  PULMONARY/CHEST: No rales, rhonchi, or wheezing  CARDIOVASC: Regular rate and rhythm;   GI/ABDOMEN: Soft, Nontender, Nondistended; Bowel sounds present  EXTREMITIES: contracted, no leg edema   SKIN: right hip wound, left foot wound, no discharge  NERVOUS SYSTEM: awake, does not follow commands, traces with eyes, opens eyes on voice       LABS:                        11.2   6.58  )-----------( 327      ( 01 Apr 2021 08:03 )             35.6     04-01    141  |  101  |  7<L>  ----------------------------<  118<H>  4.7   |  30  |  0.7    Ca    8.6      01 Apr 2021 08:03    TPro  6.1  /  Alb  2.8<L>  /  TBili  <0.2  /  DBili  x   /  AST  18  /  ALT  14  /  AlkPhos  93  04-01        MEDICATIONS  (STANDING):  ascorbic acid 500 milliGRAM(s) Oral daily  chlorhexidine 4% Liquid 1 Application(s) Topical <User Schedule>  cholecalciferol 2000 Unit(s) Oral daily  collagenase Ointment 1 Application(s) Topical two times a day  Dakins Solution - 1/2 Strength 1 Application(s) Topical two times a day  enoxaparin Injectable 40 milliGRAM(s) SubCutaneous daily  gabapentin 600 milliGRAM(s) Oral daily  melatonin 5 milliGRAM(s) Oral at bedtime  midodrine. 10 milliGRAM(s) Oral three times a day  multivitamin 1 Tablet(s) Oral daily  raloxifene 60 milliGRAM(s) Oral daily    MEDICATIONS  (PRN):

## 2021-04-01 NOTE — PROVIDER CONTACT NOTE (OTHER) - DATE AND TIME:
27-Mar-2021 03:12
27-Mar-2021 04:50
27-Mar-2021 04:59
29-Mar-2021 23:52
01-Apr-2021 04:36
21-Mar-2021 01:09
27-Mar-2021 06:52
30-Mar-2021 00:34

## 2021-04-01 NOTE — PROGRESS NOTE ADULT - ASSESSMENT
54 yo female hx of Down syndrome, osteoporosis, neuropathy, mild anemia, and recent decubitus ulcer to right hip s/p debridement on 3/2 at NH sent from [facility] for hypotension (66/44) and fever.    # Suspected sepsis POA likely due to infected decubitus ulcers - sepssi resolved  - s/p debridement at bedside 3/30 and in OR 3/26 by burn   - Wound Cx from 3/26 multiple MDRO: Proteus mirabilis, Acinetobacter baumannii/nosocom group (Carbapenem Resistant), Enterococcus faecium (vancomycin resistant)   - pt was receiving Vancomycin and Meropenem  - as per ID no need for ABx, cont wound care  - pt remains afebrile, no leucocytosis   - frequent turns, offloading of pressure points    # Hypotension - cont Midodrine 10 mg TID, monitor BP    # Chronic normocytic Anemia - Hb at baseline  # Down syndrome  # Osteoporosis - cont Raloxifene and Vit D  # Neuropathy - cont Gabapentin    Pt is clinically stable for discharge to SNF, awaiting auth.

## 2021-04-02 ENCOUNTER — TRANSCRIPTION ENCOUNTER (OUTPATIENT)
Age: 56
End: 2021-04-02

## 2021-04-02 VITALS
HEART RATE: 113 BPM | DIASTOLIC BLOOD PRESSURE: 54 MMHG | SYSTOLIC BLOOD PRESSURE: 130 MMHG | TEMPERATURE: 97 F | RESPIRATION RATE: 18 BRPM

## 2021-04-02 PROCEDURE — 99239 HOSP IP/OBS DSCHRG MGMT >30: CPT

## 2021-04-02 RX ADMIN — Medication 1 TABLET(S): at 11:09

## 2021-04-02 RX ADMIN — Medication 500 MILLIGRAM(S): at 11:09

## 2021-04-02 RX ADMIN — MIDODRINE HYDROCHLORIDE 10 MILLIGRAM(S): 2.5 TABLET ORAL at 05:48

## 2021-04-02 RX ADMIN — GABAPENTIN 600 MILLIGRAM(S): 400 CAPSULE ORAL at 11:09

## 2021-04-02 RX ADMIN — Medication 1 APPLICATION(S): at 05:48

## 2021-04-02 RX ADMIN — ENOXAPARIN SODIUM 40 MILLIGRAM(S): 100 INJECTION SUBCUTANEOUS at 11:09

## 2021-04-02 RX ADMIN — Medication 2000 UNIT(S): at 11:09

## 2021-04-02 RX ADMIN — MIDODRINE HYDROCHLORIDE 10 MILLIGRAM(S): 2.5 TABLET ORAL at 11:09

## 2021-04-02 RX ADMIN — CHLORHEXIDINE GLUCONATE 1 APPLICATION(S): 213 SOLUTION TOPICAL at 05:48

## 2021-04-02 RX ADMIN — RALOXIFENE HYDROCHLORIDE 60 MILLIGRAM(S): 60 TABLET, COATED ORAL at 11:09

## 2021-04-02 NOTE — PROGRESS NOTE ADULT - ASSESSMENT
54 yo female hx of Down syndrome, osteoporosis, neuropathy, mild anemia, and recent decubitus ulcer to right hip s/p debridement on 3/2 at NH sent from [facility] for hypotension (66/44) and fever.    # Sepsis POA likely due to infected decubitus ulcers - sepsis resolved  - s/p debridement at bedside 3/30 and in OR 3/26 by burn   - Wound Cx from 3/26 multiple MDRO: Proteus mirabilis, Acinetobacter baumannii/nosocom group (Carbapenem Resistant), Enterococcus faecium (vancomycin resistant)   - pt was receiving Vancomycin and Meropenem  - as per ID no need for ABx, cont wound care  - pt remains afebrile, no leucocytosis   - frequent turns, offloading of pressure points    # Hypotension - cont Midodrine 10 mg TID, monitor BP  # Chronic normocytic Anemia - Hb at baseline  # Down syndrome  # Osteoporosis - cont Raloxifene and Vit D  # Neuropathy - cont Gabapentin    Pt is clinically stable for discharge to SNF.

## 2021-04-02 NOTE — DISCHARGE NOTE NURSING/CASE MANAGEMENT/SOCIAL WORK - PATIENT PORTAL LINK FT
You can access the FollowMyHealth Patient Portal offered by [facility] by registering at the following website: [email or web address]. By joining [facility]’s FollowMyHealth portal, you will also be able to view your health information using other applications (apps) compatible with our system.

## 2021-04-02 NOTE — PROGRESS NOTE ADULT - SUBJECTIVE AND OBJECTIVE BOX
JERICHO TEA    Patient is a 55y old  Female who presents with a chief complaint of Hypotension and fever (02 Apr 2021 09:41)    INTERVAL HPI/OVERNIGHT EVENTS: no events overnight    ROS: unable to obtain, pt is nonverbal    PHYSICAL EXAM:  T(C): 35.9, Max: 36.8 (04-02-21 @ 07:19)  HR: 113 (83 - 113)  BP: 130/54 (101/56 - 130/54)  RR: 18 (18 - 18)  SpO2: --    GENERAL: NAD, Down syndrome features, laying mostly on left side  PULMONARY/CHEST: No rales, rhonchi, or wheezing  CARDIOVASC: Regular rate and rhythm;   GI/ABDOMEN: Soft, Nontender, Nondistended; Bowel sounds present  EXTREMITIES: contracted, no leg edema   SKIN: right hip wound, left foot wound, no discharge  NERVOUS SYSTEM: awake, does not follow commands, traces with eyes, opens eyes on voice       LABS: no new labs                         MEDICATIONS  (STANDING):  ascorbic acid 500 milliGRAM(s) Oral daily  chlorhexidine 4% Liquid 1 Application(s) Topical <User Schedule>  cholecalciferol 2000 Unit(s) Oral daily  collagenase Ointment 1 Application(s) Topical two times a day  Dakins Solution - 1/2 Strength 1 Application(s) Topical two times a day  enoxaparin Injectable 40 milliGRAM(s) SubCutaneous daily  gabapentin 600 milliGRAM(s) Oral daily  melatonin 5 milliGRAM(s) Oral at bedtime  midodrine. 10 milliGRAM(s) Oral three times a day  multivitamin 1 Tablet(s) Oral daily  raloxifene 60 milliGRAM(s) Oral daily    MEDICATIONS  (PRN):

## 2021-04-02 NOTE — PROGRESS NOTE ADULT - PROVIDER SPECIALTY LIST ADULT
Hospitalist
Infectious Disease
Internal Medicine
Internal Medicine
Hospitalist
Infectious Disease
Internal Medicine
Burn
Hospitalist
Internal Medicine
Internal Medicine
Podiatry
Hospitalist

## 2021-04-02 NOTE — PROGRESS NOTE ADULT - SUBJECTIVE AND OBJECTIVE BOX
Patient is a 55y old  Female who presents with a chief complaint of Hypotension and fever (19 Mar 2021 20:55)      OVERNIGHT EVENTS: Hospital day 14. Patient seen and examined at bedside. Patient does not appear to be in any acute distress, lying comfortably on bed. Patient is medically optimized. No acute overnight event.     REVIEW OF SYSTEMS:  Patient nonverbal. Could not assess.    FAMILY HISTORY:  No pertinent family history     Vital Signs Last 24 Hrs  T(C): 36.8 (02 Apr 2021 07:19), Max: 36.8 (01 Apr 2021 15:36)  T(F): 98.3 (02 Apr 2021 07:19), Max: 98.3 (02 Apr 2021 07:19)  HR: 83 (02 Apr 2021 07:19) (83 - 98)  BP: 110/57 (02 Apr 2021 07:19) (101/56 - 110/57)  BP(mean): --  RR: 18 (02 Apr 2021 07:19) (18 - 18)  SpO2: --    PHYSICAL EXAM:  GENERAL: NAD, nonverbal, not in acute distress, lying comfortably on bed  HEAD:  Atraumatic, Normocephalic  CHEST/LUNG: Clear to percussion bilaterally; No rales, rhonchi, wheezing, or rubs  HEART: RRR; No murmurs, rubs, or gallops  ABDOMEN: Soft, Nontender, Nondistended; Bowel sounds present  EXTREMITIES:  No clubbing, cyanosis, or edema  SKIN: Right hip full thickness wound about 9cm x 9cm and , s/p debridement, serosanguineous discharge, no acute bleeding, no pus draining.   Right plantar 7cm x 6cm blister, lanced and drained devitalized skin pink/red, no edema, no erythema, no bleeding, no infection   Left medial heel with 4cm x4cm ruptured blister revealing pink ulcer ; devitalized skin   pink/red, no edema, no erythema, no bleeding, no infection.   small wounds - desiccated  ulcers bilateral great toes     Consultant(s) Notes Reviewed:  [x ] YES  [ ] NO  Care Discussed with Consultants/Other Providers [ x] YES  [ ] NO         LABS:                          11.2   6.58  )-----------( 327      ( 01 Apr 2021 08:03 )             35.6     04-01    141  |  101  |  7<L>  ----------------------------<  118<H>  4.7   |  30  |  0.7    Ca    8.6      01 Apr 2021 08:03    TPro  6.1  /  Alb  2.8<L>  /  TBili  <0.2  /  DBili  x   /  AST  18  /  ALT  14  /  AlkPhos  93  04-01           RADIOLOGY & ADDITIONAL TESTS:      < from: Xray Chest 1 View- PORTABLE-Urgent (Xray Chest 1 View- PORTABLE-Urgent .) (03.19.21 @ 01:53) >  Impression:    Right base opacity and a left retrocardiac opacity. Findings may be infectious in nature. Clinical correlation is recommended. Follow to resolution is necessary to exclude underlying process    < end of copied text >      < from: Xray Foot AP + Lateral, Right (03.19.21 @ 02:59) >    Impression    Likely fracture of the tuft of the distal second phalanx.    Diffuse soft tissue swelling, especially along the dorsum of the foot      < end of copied text >    MEDICATIONS  (STANDING):  ascorbic acid 500 milliGRAM(s) Oral daily  chlorhexidine 4% Liquid 1 Application(s) Topical <User Schedule>  cholecalciferol 2000 Unit(s) Oral daily  collagenase Ointment 1 Application(s) Topical two times a day  Dakins Solution - 1/2 Strength 1 Application(s) Topical two times a day  enoxaparin Injectable 40 milliGRAM(s) SubCutaneous daily  gabapentin 600 milliGRAM(s) Oral daily  melatonin 5 milliGRAM(s) Oral at bedtime  midodrine. 10 milliGRAM(s) Oral three times a day  multivitamin 1 Tablet(s) Oral daily  raloxifene 60 milliGRAM(s) Oral daily        Imaging Personally Reviewed:  [] YES  [ ] NO        Assessment/Plan:    54 yo female hx of Down syndrome, osteoporosis, neuropathy, mild anemia, and recent decubitus ulcer to right hip s/p debridement on 3/2 at NH sent from [facility] for hypotension (66/44) and fever.    # Fevers/Hypotension likely 2/2 infected decubitus ulcer (sepsis resolved)  -was on danna and vanc from 3/15 to be completed 3/22 via PICC line PICC line removed during admission  - s/p debridement at bedside 3/23 and in OR 3/26 by burn   - Wound Cx from 3/26 multiple MDRO: Proteus mirabilis, Acinetobacter baumannii/nosocom group (Carbapenem Resistant), Enterococcus faecium (vancomycin resistant)   - pt was receiving Vancomycin and Meropenem switched to cefepime and metronidazole  - ID rec d/c ABx 3/28  - c/w wound care as per burn and podiatry  - pt remains afebrile, no leucocytosis   - c/w frequent turns, offloading of pressure points      # Chronic normocytic Anemia (back to baseline)    # Osteoporosis  - continue with raloxifene  - continue with Vit D    # Neuropathy  - continue with gabapentin    # Transaminitis (resolved)        Diet: Dysphagia 1 until S&S  Activity: WBAT w/ DARCO shoe   DVT ppx: Lovenox  GI ppx: Not indicated  Code Status: Full  Disposition: From [facility]    Discharge pending today, pending authorization; COVID negative .

## 2021-04-02 NOTE — PROGRESS NOTE ADULT - REASON FOR ADMISSION
Hypotension and fever

## 2021-04-06 DIAGNOSIS — F03.90 UNSPECIFIED DEMENTIA WITHOUT BEHAVIORAL DISTURBANCE: ICD-10-CM

## 2021-04-06 DIAGNOSIS — M81.0 AGE-RELATED OSTEOPOROSIS WITHOUT CURRENT PATHOLOGICAL FRACTURE: ICD-10-CM

## 2021-04-06 DIAGNOSIS — J18.9 PNEUMONIA, UNSPECIFIED ORGANISM: ICD-10-CM

## 2021-04-06 DIAGNOSIS — R74.01 ELEVATION OF LEVELS OF LIVER TRANSAMINASE LEVELS: ICD-10-CM

## 2021-04-06 DIAGNOSIS — D64.9 ANEMIA, UNSPECIFIED: ICD-10-CM
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
## 2021-04-06 DIAGNOSIS — B96.5 PSEUDOMONAS (AERUGINOSA) (MALLEI) (PSEUDOMALLEI) AS THE CAUSE OF DISEASES CLASSIFIED ELSEWHERE: ICD-10-CM

## 2021-04-06 DIAGNOSIS — I95.9 HYPOTENSION, UNSPECIFIED: ICD-10-CM

## 2021-04-06 DIAGNOSIS — L89.214 PRESSURE ULCER OF RIGHT HIP, STAGE 4: ICD-10-CM

## 2021-04-06 DIAGNOSIS — Q90.9 DOWN SYNDROME, UNSPECIFIED: ICD-10-CM

## 2021-04-06 DIAGNOSIS — A41.9 SEPSIS, UNSPECIFIED ORGANISM: ICD-10-CM

## 2021-04-06 DIAGNOSIS — L89.622 PRESSURE ULCER OF LEFT HEEL, STAGE 2: ICD-10-CM

## 2021-04-06 DIAGNOSIS — L89.610 PRESSURE ULCER OF RIGHT HEEL, UNSTAGEABLE: ICD-10-CM

## 2021-04-06 DIAGNOSIS — G62.9 POLYNEUROPATHY, UNSPECIFIED: ICD-10-CM

## 2021-04-06 DIAGNOSIS — S92.531A DISPLACED FRACTURE OF DISTAL PHALANX OF RIGHT LESSER TOE(S), INITIAL ENCOUNTER FOR CLOSED FRACTURE: ICD-10-CM
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
## 2021-08-26 PROBLEM — Z00.00 ENCOUNTER FOR PREVENTIVE HEALTH EXAMINATION: Status: ACTIVE | Noted: 2021-08-26

## 2021-08-26 PROBLEM — M81.0 AGE-RELATED OSTEOPOROSIS WITHOUT CURRENT PATHOLOGICAL FRACTURE: Chronic | Status: ACTIVE | Noted: 2021-03-19

## 2021-08-26 PROBLEM — G62.9 POLYNEUROPATHY, UNSPECIFIED: Chronic | Status: ACTIVE | Noted: 2021-03-19

## 2021-08-26 PROBLEM — D64.9 ANEMIA, UNSPECIFIED: Chronic | Status: ACTIVE | Noted: 2021-03-19

## 2021-08-26 PROBLEM — Q90.9 DOWN SYNDROME, UNSPECIFIED: Chronic | Status: ACTIVE | Noted: 2021-03-19

## 2021-09-07 ENCOUNTER — APPOINTMENT (OUTPATIENT)
Dept: PODIATRY | Facility: CLINIC | Age: 56
End: 2021-09-07
Payer: COMMERCIAL

## 2021-09-07 VITALS — HEART RATE: 59 BPM | TEMPERATURE: 97.4 F

## 2021-09-07 PROCEDURE — 99212 OFFICE O/P EST SF 10 MIN: CPT | Mod: 25

## 2021-09-07 PROCEDURE — 11721 DEBRIDE NAIL 6 OR MORE: CPT

## 2021-09-07 PROCEDURE — 99072 ADDL SUPL MATRL&STAF TM PHE: CPT

## 2021-09-08 NOTE — PHYSICAL EXAM
[Ankle Swelling (On Exam)] : not present [Varicose Veins Of Lower Extremities] : not present [2+] : left foot dorsalis pedis 2+ [No Joint Swelling] : no joint swelling [] : normal strength/tone [Normal Foot/Ankle] : Both lower extremities were exposed and visualized. Standing exam demonstrates normal foot posture and alignment. Hindfoot exam shows no hindfoot valgus or varus [FreeTextEntry1] : right foot wound  [Sensation] : the sensory exam was normal to light touch and pinprick [No Focal Deficits] : no focal deficits [Deep Tendon Reflexes (DTR)] : deep tendon reflexes were 2+ and symmetric [Motor Exam] : the motor exam was normal [Oriented To Time, Place, And Person] : oriented to person, place, and time [Impaired Insight] : insight and judgment were intact [Affect] : the affect was normal

## 2021-09-08 NOTE — PROCEDURE
[FreeTextEntry1] : Patient examined, history and chart reviewed\par right foot wound \par nails debrided\par follow up in 1 month

## 2021-09-08 NOTE — HISTORY OF PRESENT ILLNESS
[FreeTextEntry1] : Patient is no verbal wheelchair bound with right foot wound \par \par PAtient has elongated thickened nails that cause ulceration

## 2021-09-17 ENCOUNTER — OUTPATIENT (OUTPATIENT)
Dept: OUTPATIENT SERVICES | Facility: HOSPITAL | Age: 56
LOS: 1 days | Discharge: HOME | End: 2021-09-17

## 2021-09-17 DIAGNOSIS — Z98.890 OTHER SPECIFIED POSTPROCEDURAL STATES: Chronic | ICD-10-CM

## 2021-09-17 DIAGNOSIS — F73 PROFOUND INTELLECTUAL DISABILITIES: ICD-10-CM

## 2021-10-05 ENCOUNTER — APPOINTMENT (OUTPATIENT)
Dept: PODIATRY | Facility: CLINIC | Age: 56
End: 2021-10-05
Payer: COMMERCIAL

## 2021-10-05 VITALS — TEMPERATURE: 97.3 F

## 2021-10-05 PROCEDURE — 11721 DEBRIDE NAIL 6 OR MORE: CPT

## 2021-10-05 PROCEDURE — 99072 ADDL SUPL MATRL&STAF TM PHE: CPT

## 2021-12-14 NOTE — ED ADULT TRIAGE NOTE - NS ED NURSE DIRECT TO ROOM YN
input(s): COLORU, PHUR, LABCAST, WBCUA, RBCUA, MUCUS, TRICHOMONAS, YEAST, BACTERIA, CLARITYU, SPECGRAV, LEUKOCYTESUR, UROBILINOGEN, BILIRUBINUR, BLOODU in the last 72 hours. Invalid input(s): NITRATE, GLUCOSEUKETONESUAMORPHOUS    Additional Lab/culture results:    Physical Exam: Patient extubated yesterday has excellent diuresis catheter in place bladder decompressed    Interval Imaging Findings:    Impression:    Patient Active Problem List   Diagnosis    Intestinal adhesions    Obesity (BMI 30-39. 9)    Urinary incontinence    Constipation    Ventral incisional hernia    Smoker    Stricture of sigmoid colon (HCC)       Plan: Maintain indwelling Rose monitor renal function in catheter associated UTIs    Agata Akers MD  7:12 AM 12/14/2021 No

## 2021-12-21 ENCOUNTER — OUTPATIENT (OUTPATIENT)
Dept: OUTPATIENT SERVICES | Facility: HOSPITAL | Age: 56
LOS: 1 days | Discharge: HOME | End: 2021-12-21

## 2021-12-21 DIAGNOSIS — F73 PROFOUND INTELLECTUAL DISABILITIES: ICD-10-CM

## 2021-12-21 DIAGNOSIS — Z98.890 OTHER SPECIFIED POSTPROCEDURAL STATES: Chronic | ICD-10-CM

## 2022-01-14 NOTE — ED ADULT NURSE NOTE - CAS TRG GENERAL AIRWAY, MLM
Patient called in stating she is having a lot of pain in her knees and her back for several weeks  She called yesterday for this and when I called her back to discuss it with her she had no idea what I was talking about  She said she does not recall that conversation  She said she was on Oxycodone and Morphine in the past and it was taken away from her and she needs something to replace it  I did make her aware that we will not be restarting her on narcotics  She said she needs something to replace it  She said the Tylenol, Voltaren and Lyrica are not working  Is there something besides narcotics that you will prescribe? Patent

## 2022-01-25 ENCOUNTER — APPOINTMENT (OUTPATIENT)
Dept: PODIATRY | Facility: CLINIC | Age: 57
End: 2022-01-25

## 2022-03-24 ENCOUNTER — INPATIENT (INPATIENT)
Facility: HOSPITAL | Age: 57
LOS: 17 days | Discharge: HOME | End: 2022-04-11
Attending: INTERNAL MEDICINE | Admitting: INTERNAL MEDICINE
Payer: COMMERCIAL

## 2022-03-24 VITALS
OXYGEN SATURATION: 91 % | RESPIRATION RATE: 18 BRPM | SYSTOLIC BLOOD PRESSURE: 104 MMHG | HEIGHT: 58 IN | DIASTOLIC BLOOD PRESSURE: 68 MMHG | HEART RATE: 115 BPM | TEMPERATURE: 101 F | WEIGHT: 154.32 LBS

## 2022-03-24 DIAGNOSIS — Z98.890 OTHER SPECIFIED POSTPROCEDURAL STATES: Chronic | ICD-10-CM

## 2022-03-24 LAB
ALBUMIN SERPL ELPH-MCNC: 3.6 G/DL — SIGNIFICANT CHANGE UP (ref 3.5–5.2)
ALP SERPL-CCNC: 124 U/L — HIGH (ref 30–115)
ALT FLD-CCNC: 18 U/L — SIGNIFICANT CHANGE UP (ref 0–41)
ANION GAP SERPL CALC-SCNC: 13 MMOL/L — SIGNIFICANT CHANGE UP (ref 7–14)
APPEARANCE UR: CLEAR — SIGNIFICANT CHANGE UP
APTT BLD: 22.3 SEC — CRITICAL LOW (ref 27–39.2)
AST SERPL-CCNC: 22 U/L — SIGNIFICANT CHANGE UP (ref 0–41)
BACTERIA # UR AUTO: NEGATIVE — SIGNIFICANT CHANGE UP
BASOPHILS # BLD AUTO: 0.1 K/UL — SIGNIFICANT CHANGE UP (ref 0–0.2)
BASOPHILS NFR BLD AUTO: 0.9 % — SIGNIFICANT CHANGE UP (ref 0–1)
BILIRUB SERPL-MCNC: 0.3 MG/DL — SIGNIFICANT CHANGE UP (ref 0.2–1.2)
BILIRUB UR-MCNC: NEGATIVE — SIGNIFICANT CHANGE UP
BUN SERPL-MCNC: 20 MG/DL — SIGNIFICANT CHANGE UP (ref 10–20)
CALCIUM SERPL-MCNC: 9.3 MG/DL — SIGNIFICANT CHANGE UP (ref 8.5–10.1)
CHLORIDE SERPL-SCNC: 106 MMOL/L — SIGNIFICANT CHANGE UP (ref 98–110)
CO2 SERPL-SCNC: 27 MMOL/L — SIGNIFICANT CHANGE UP (ref 17–32)
COLOR SPEC: SIGNIFICANT CHANGE UP
CREAT SERPL-MCNC: 0.9 MG/DL — SIGNIFICANT CHANGE UP (ref 0.7–1.5)
DIFF PNL FLD: ABNORMAL
EGFR: 76 ML/MIN/1.73M2 — SIGNIFICANT CHANGE UP
EOSINOPHIL # BLD AUTO: 0.09 K/UL — SIGNIFICANT CHANGE UP (ref 0–0.7)
EOSINOPHIL NFR BLD AUTO: 0.8 % — SIGNIFICANT CHANGE UP (ref 0–8)
EPI CELLS # UR: 0 /HPF — SIGNIFICANT CHANGE UP (ref 0–5)
FLUAV AG NPH QL: SIGNIFICANT CHANGE UP
FLUBV AG NPH QL: SIGNIFICANT CHANGE UP
GIANT PLATELETS BLD QL SMEAR: PRESENT — SIGNIFICANT CHANGE UP
GLUCOSE SERPL-MCNC: 132 MG/DL — HIGH (ref 70–99)
GLUCOSE UR QL: NEGATIVE — SIGNIFICANT CHANGE UP
HCG SERPL QL: NEGATIVE — SIGNIFICANT CHANGE UP
HCT VFR BLD CALC: 43.1 % — SIGNIFICANT CHANGE UP (ref 37–47)
HGB BLD-MCNC: 14.6 G/DL — SIGNIFICANT CHANGE UP (ref 12–16)
HYALINE CASTS # UR AUTO: 2 /LPF — SIGNIFICANT CHANGE UP (ref 0–7)
INR BLD: 1.1 RATIO — SIGNIFICANT CHANGE UP (ref 0.65–1.3)
KETONES UR-MCNC: NEGATIVE — SIGNIFICANT CHANGE UP
LACTATE SERPL-SCNC: 1.8 MMOL/L — SIGNIFICANT CHANGE UP (ref 0.7–2)
LACTATE SERPL-SCNC: 2.3 MMOL/L — HIGH (ref 0.7–2)
LEUKOCYTE ESTERASE UR-ACNC: ABNORMAL
LYMPHOCYTES # BLD AUTO: 0.46 K/UL — LOW (ref 1.2–3.4)
LYMPHOCYTES # BLD AUTO: 4.3 % — LOW (ref 20.5–51.1)
MANUAL SMEAR VERIFICATION: SIGNIFICANT CHANGE UP
MCHC RBC-ENTMCNC: 32.7 PG — HIGH (ref 27–31)
MCHC RBC-ENTMCNC: 33.9 G/DL — SIGNIFICANT CHANGE UP (ref 32–37)
MCV RBC AUTO: 96.6 FL — SIGNIFICANT CHANGE UP (ref 81–99)
MONOCYTES # BLD AUTO: 0.46 K/UL — SIGNIFICANT CHANGE UP (ref 0.1–0.6)
MONOCYTES NFR BLD AUTO: 4.3 % — SIGNIFICANT CHANGE UP (ref 1.7–9.3)
NEUTROPHILS # BLD AUTO: 9.44 K/UL — HIGH (ref 1.4–6.5)
NEUTROPHILS NFR BLD AUTO: 87.1 % — HIGH (ref 42.2–75.2)
NEUTS BAND # BLD: 1.7 % — SIGNIFICANT CHANGE UP (ref 0–6)
NITRITE UR-MCNC: NEGATIVE — SIGNIFICANT CHANGE UP
PH UR: 7 — SIGNIFICANT CHANGE UP (ref 5–8)
PLAT MORPH BLD: NORMAL — SIGNIFICANT CHANGE UP
PLATELET # BLD AUTO: 212 K/UL — SIGNIFICANT CHANGE UP (ref 130–400)
POTASSIUM SERPL-MCNC: 4.4 MMOL/L — SIGNIFICANT CHANGE UP (ref 3.5–5)
POTASSIUM SERPL-SCNC: 4.4 MMOL/L — SIGNIFICANT CHANGE UP (ref 3.5–5)
PROT SERPL-MCNC: 6.3 G/DL — SIGNIFICANT CHANGE UP (ref 6–8)
PROT UR-MCNC: NEGATIVE — SIGNIFICANT CHANGE UP
PROTHROM AB SERPL-ACNC: 12.6 SEC — SIGNIFICANT CHANGE UP (ref 9.95–12.87)
RBC # BLD: 4.46 M/UL — SIGNIFICANT CHANGE UP (ref 4.2–5.4)
RBC # FLD: 14.6 % — HIGH (ref 11.5–14.5)
RBC BLD AUTO: NORMAL — SIGNIFICANT CHANGE UP
RBC CASTS # UR COMP ASSIST: 2 /HPF — SIGNIFICANT CHANGE UP (ref 0–4)
RSV RNA NPH QL NAA+NON-PROBE: SIGNIFICANT CHANGE UP
SARS-COV-2 RNA SPEC QL NAA+PROBE: SIGNIFICANT CHANGE UP
SODIUM SERPL-SCNC: 146 MMOL/L — SIGNIFICANT CHANGE UP (ref 135–146)
SP GR SPEC: 1.01 — SIGNIFICANT CHANGE UP (ref 1.01–1.03)
UROBILINOGEN FLD QL: SIGNIFICANT CHANGE UP
VARIANT LYMPHS # BLD: 0.9 % — SIGNIFICANT CHANGE UP (ref 0–5)
WBC # BLD: 10.63 K/UL — SIGNIFICANT CHANGE UP (ref 4.8–10.8)
WBC # FLD AUTO: 10.63 K/UL — SIGNIFICANT CHANGE UP (ref 4.8–10.8)
WBC UR QL: 126 /HPF — HIGH (ref 0–5)

## 2022-03-24 PROCEDURE — 71045 X-RAY EXAM CHEST 1 VIEW: CPT | Mod: 26

## 2022-03-24 PROCEDURE — 93010 ELECTROCARDIOGRAM REPORT: CPT

## 2022-03-24 PROCEDURE — 99291 CRITICAL CARE FIRST HOUR: CPT | Mod: 25

## 2022-03-24 PROCEDURE — 71045 X-RAY EXAM CHEST 1 VIEW: CPT | Mod: 26,77

## 2022-03-24 PROCEDURE — 36556 INSERT NON-TUNNEL CV CATH: CPT

## 2022-03-24 PROCEDURE — 74177 CT ABD & PELVIS W/CONTRAST: CPT | Mod: 26,MG

## 2022-03-24 PROCEDURE — G1004: CPT

## 2022-03-24 RX ORDER — RALOXIFENE HYDROCHLORIDE 60 MG/1
60 TABLET, COATED ORAL DAILY
Refills: 0 | Status: DISCONTINUED | OUTPATIENT
Start: 2022-03-24 | End: 2022-04-01

## 2022-03-24 RX ORDER — GABAPENTIN 400 MG/1
300 CAPSULE ORAL EVERY 12 HOURS
Refills: 0 | Status: DISCONTINUED | OUTPATIENT
Start: 2022-03-24 | End: 2022-04-01

## 2022-03-24 RX ORDER — SODIUM CHLORIDE 9 MG/ML
2500 INJECTION, SOLUTION INTRAVENOUS ONCE
Refills: 0 | Status: COMPLETED | OUTPATIENT
Start: 2022-03-24 | End: 2022-03-24

## 2022-03-24 RX ORDER — GABAPENTIN 400 MG/1
1 CAPSULE ORAL
Qty: 0 | Refills: 0 | DISCHARGE

## 2022-03-24 RX ORDER — SODIUM CHLORIDE 9 MG/ML
500 INJECTION, SOLUTION INTRAVENOUS ONCE
Refills: 0 | Status: COMPLETED | OUTPATIENT
Start: 2022-03-24 | End: 2022-03-24

## 2022-03-24 RX ORDER — CEFEPIME 1 G/1
2000 INJECTION, POWDER, FOR SOLUTION INTRAMUSCULAR; INTRAVENOUS EVERY 8 HOURS
Refills: 0 | Status: DISCONTINUED | OUTPATIENT
Start: 2022-03-24 | End: 2022-03-31

## 2022-03-24 RX ORDER — ACETAMINOPHEN 500 MG
975 TABLET ORAL ONCE
Refills: 0 | Status: COMPLETED | OUTPATIENT
Start: 2022-03-24 | End: 2022-03-24

## 2022-03-24 RX ORDER — COLLAGENASE CLOSTRIDIUM HIST. 250 UNIT/G
1 OINTMENT (GRAM) TOPICAL
Refills: 0 | Status: DISCONTINUED | OUTPATIENT
Start: 2022-03-24 | End: 2022-04-01

## 2022-03-24 RX ORDER — NOREPINEPHRINE BITARTRATE/D5W 8 MG/250ML
0.05 PLASTIC BAG, INJECTION (ML) INTRAVENOUS
Qty: 8 | Refills: 0 | Status: DISCONTINUED | OUTPATIENT
Start: 2022-03-24 | End: 2022-03-30

## 2022-03-24 RX ORDER — AZITHROMYCIN 500 MG/1
500 TABLET, FILM COATED ORAL ONCE
Refills: 0 | Status: COMPLETED | OUTPATIENT
Start: 2022-03-24 | End: 2022-03-24

## 2022-03-24 RX ORDER — METRONIDAZOLE 500 MG
500 TABLET ORAL EVERY 8 HOURS
Refills: 0 | Status: DISCONTINUED | OUTPATIENT
Start: 2022-03-24 | End: 2022-03-28

## 2022-03-24 RX ORDER — CHLORHEXIDINE GLUCONATE 213 G/1000ML
1 SOLUTION TOPICAL
Refills: 0 | Status: DISCONTINUED | OUTPATIENT
Start: 2022-03-24 | End: 2022-04-01

## 2022-03-24 RX ORDER — VANCOMYCIN HCL 1 G
1000 VIAL (EA) INTRAVENOUS EVERY 12 HOURS
Refills: 0 | Status: DISCONTINUED | OUTPATIENT
Start: 2022-03-24 | End: 2022-03-31

## 2022-03-24 RX ORDER — ENOXAPARIN SODIUM 100 MG/ML
40 INJECTION SUBCUTANEOUS EVERY 24 HOURS
Refills: 0 | Status: DISCONTINUED | OUTPATIENT
Start: 2022-03-24 | End: 2022-04-01

## 2022-03-24 RX ORDER — MIDODRINE HYDROCHLORIDE 2.5 MG/1
10 TABLET ORAL THREE TIMES A DAY
Refills: 0 | Status: DISCONTINUED | OUTPATIENT
Start: 2022-03-24 | End: 2022-03-27

## 2022-03-24 RX ORDER — LANOLIN ALCOHOL/MO/W.PET/CERES
1 CREAM (GRAM) TOPICAL
Qty: 0 | Refills: 0 | DISCHARGE

## 2022-03-24 RX ORDER — CEFTRIAXONE 500 MG/1
1000 INJECTION, POWDER, FOR SOLUTION INTRAMUSCULAR; INTRAVENOUS ONCE
Refills: 0 | Status: COMPLETED | OUTPATIENT
Start: 2022-03-24 | End: 2022-03-24

## 2022-03-24 RX ORDER — LANOLIN ALCOHOL/MO/W.PET/CERES
5 CREAM (GRAM) TOPICAL AT BEDTIME
Refills: 0 | Status: DISCONTINUED | OUTPATIENT
Start: 2022-03-24 | End: 2022-04-01

## 2022-03-24 RX ADMIN — CEFEPIME 100 MILLIGRAM(S): 1 INJECTION, POWDER, FOR SOLUTION INTRAMUSCULAR; INTRAVENOUS at 23:13

## 2022-03-24 RX ADMIN — Medication 6.56 MICROGRAM(S)/KG/MIN: at 20:02

## 2022-03-24 RX ADMIN — AZITHROMYCIN 255 MILLIGRAM(S): 500 TABLET, FILM COATED ORAL at 17:26

## 2022-03-24 RX ADMIN — SODIUM CHLORIDE 2500 MILLILITER(S): 9 INJECTION, SOLUTION INTRAVENOUS at 13:02

## 2022-03-24 RX ADMIN — SODIUM CHLORIDE 1000 MILLILITER(S): 9 INJECTION, SOLUTION INTRAVENOUS at 23:00

## 2022-03-24 RX ADMIN — Medication 100 MILLIGRAM(S): at 23:25

## 2022-03-24 RX ADMIN — CEFTRIAXONE 100 MILLIGRAM(S): 500 INJECTION, POWDER, FOR SOLUTION INTRAMUSCULAR; INTRAVENOUS at 13:01

## 2022-03-24 RX ADMIN — Medication 975 MILLIGRAM(S): at 13:02

## 2022-03-24 NOTE — H&P ADULT - ATTENDING COMMENTS
Events noted, sepsis present on admssion, aspiration pneumonia, cystitis, down syndrome, IV ABX, swab for MRSA, FUP, speen and swallow, taper pressors, pocus, MICU, echo, GOC

## 2022-03-24 NOTE — PROCEDURAL SAFETY CHECKLIST WITH OR WITHOUT SEDATION - NSPRESURGSED_GEN_ALL_CORE
n/a Complex Repair And Transposition Flap Text: The defect edges were debeveled with a #15 scalpel blade.  The primary defect was closed partially with a complex linear closure.  Given the location of the remaining defect, shape of the defect and the proximity to free margins a transposition flap was deemed most appropriate for complete closure of the defect.  Using a sterile surgical marker, an appropriate advancement flap was drawn incorporating the defect and placing the expected incisions within the relaxed skin tension lines where possible.    The area thus outlined was incised deep to adipose tissue with a #15 scalpel blade.  The skin margins were undermined to an appropriate distance in all directions utilizing iris scissors.

## 2022-03-24 NOTE — H&P ADULT - NSHPLABSRESULTS_GEN_ALL_CORE
LABS:  cret                        14.6   10.63 )-----------( 212      ( 24 Mar 2022 12:36 )             43.1     03-24    146  |  106  |  20  ----------------------------<  132<H>  4.4   |  27  |  0.9    Ca    9.3      24 Mar 2022 12:36    TPro  6.3  /  Alb  3.6  /  TBili  0.3  /  DBili  x   /  AST  22  /  ALT  18  /  AlkPhos  124<H>  03-24    PT/INR - ( 24 Mar 2022 12:36 )   PT: 12.60 sec;   INR: 1.10 ratio         PTT - ( 24 Mar 2022 12:36 )  PTT:22.3 sec        < from: CT Abdomen and Pelvis w/ IV Cont (03.24.22 @ 17:00) >    IMPRESSION:    Bilateral interstitial pulmonary thickening, right greater than right.   They may reflect chronic aspirationpneumonitis in the appropriate   clinical context.    There appears to be bladder wall thickening. Rule out cystitis    Degenerative changes of the spine.    Hepatic steatosis.    < end of copied text >

## 2022-03-24 NOTE — ED ADULT NURSE REASSESSMENT NOTE - NS ED NURSE REASSESS COMMENT FT1
pt moved from main ED to Critical care area, found hypotensive and tachy placed  on levo in main ED , awaiting central line placement

## 2022-03-24 NOTE — H&P ADULT - ASSESSMENT
IMPRESSION:  Septic shock on pressors  Acute hypoxic respiratory failure  Aspiration pneumonia/pneumonitis  Acute cystitis  Intellectual disability  H/o osteoporosis    PLAN:    CNS: Monitor off sedation    HEENT: Oral care    PULMONARY:  HOB @ 45 degrees. Titrate O2 to keep saturation >91%    CARDIOVASCULAR: Continue levophed. Bolus 500cc LR. Obtain Echo    GI: NPO until swallow eval    RENAL:  Follow up lytes.  Correct as needed    INFECTIOUS DISEASE: Vancomycin, cefepime, flagyl. Obtain procalcitonin and sputum cultures. Obtain strep and legionella antigen. Follow up blood and urine cultures    HEMATOLOGICAL:  DVT prophylaxis: Lovenox    ENDOCRINE: No history of DM. Not on thyroid medication    MUSCULOSKELETAL: bed rest         IMPRESSION:  Septic shock on pressors  Acute hypoxic respiratory failure  Aspiration pneumonia/pneumonitis  Acute cystitis  Aortic stenosis?  Intellectual disability  H/o osteoporosis    PLAN:    CNS: Monitor off sedation    HEENT: Oral care    PULMONARY:  HOB @ 45 degrees. Titrate O2 to keep saturation >91%    CARDIOVASCULAR: Continue levophed. Bolus 500cc LR. Obtain Echo    GI: NPO until swallow eval    RENAL:  Follow up lytes.  Correct as needed    INFECTIOUS DISEASE: Vancomycin, cefepime, flagyl. Obtain procalcitonin and sputum cultures. Obtain strep and legionella antigen. Follow up blood and urine cultures    HEMATOLOGICAL:  DVT prophylaxis: Lovenox    ENDOCRINE: No history of DM. Not on thyroid medication    MUSCULOSKELETAL: bed rest         IMPRESSION:    Septic shock on pressors POA  Acute hypoxic respiratory failure  Aspiration pneumonia/pneumonitis  Acute cystitis  Aortic stenosis?  Intellectual disability  H/o osteoporosis    PLAN:    CNS: Monitor off sedation    HEENT: Oral care    PULMONARY:  HOB @ 45 degrees. Titrate O2 to keep saturation 88 TO 94%    CARDIOVASCULAR: Continue levophed.  Obtain Echo    GI: NPO until swallow eval    RENAL:  Follow up lytes.  Correct as needed    INFECTIOUS DISEASE: Vancomycin, cefepime, flagyl. Obtain procalcitonin and sputum cultures. Obtain strep and legionella antigen. Follow up blood and urine cultures  HEMATOLOGICAL:  DVT prophylaxis: Lovenox    ENDOCRINE: No history of DM. Not on thyroid medication    MUSCULOSKELETAL: bed rest    MICU

## 2022-03-24 NOTE — H&P ADULT - NSHPPHYSICALEXAM_GEN_ALL_CORE
Vital Signs Last 24 Hrs  T(C): 37.6 (24 Mar 2022 15:31), Max: 38.3 (24 Mar 2022 12:06)  T(F): 99.7 (24 Mar 2022 15:31), Max: 101 (24 Mar 2022 12:06)  HR: 92 (24 Mar 2022 20:12) (92 - 115)  BP: 128/58 (24 Mar 2022 20:12) (71/36 - 128/58)  RR: 20 (24 Mar 2022 20:12) (18 - 20)  SpO2: 100% (24 Mar 2022 20:12) (91% - 100%)        GENERAL: NAD, lying in bed comfortably  HEAD: Atraumatic, Normocephalic  EYES: EOMI, PERRLA, conjunctiva and sclera clear  ENT: Dry mucous membranes  NECK: Supple, No JVD  CHEST/LUNG: Clear to auscultation bilaterally; No rales, rhonchi, wheezing, or rubs. Unlabored respirations  HEART: Regular rate and rhythm; No murmurs, rubs, or gallops  ABDOMEN: Bowel sounds present; Nontender  EXTREMITIES: Sluggish capillary refill. No clubbing, cyanosis, or edema  NERVOUS SYSTEM: Nonverbal, does not follow commands Vital Signs Last 24 Hrs  T(C): 37.6 (24 Mar 2022 15:31), Max: 38.3 (24 Mar 2022 12:06)  T(F): 99.7 (24 Mar 2022 15:31), Max: 101 (24 Mar 2022 12:06)  HR: 92 (24 Mar 2022 20:12) (92 - 115)  BP: 128/58 (24 Mar 2022 20:12) (71/36 - 128/58)  RR: 20 (24 Mar 2022 20:12) (18 - 20)  SpO2: 100% (24 Mar 2022 20:12) (91% - 100%)        GENERAL: NAD, lying in bed comfortably  HEAD: Atraumatic, Normocephalic  EYES: EOMI, PERRLA, conjunctiva and sclera clear  ENT: Dry mucous membranes  NECK: Supple, No JVD  CHEST/LUNG: Clear to auscultation bilaterally; No rales, rhonchi, wheezing, or rubs. Unlabored respirations  HEART: Regular rate and rhythm; Systolic murmur heard best over aortic area  ABDOMEN: Bowel sounds present; Nontender  EXTREMITIES: Sluggish capillary refill. No clubbing, cyanosis, or edema  NERVOUS SYSTEM: Nonverbal, does not follow commands

## 2022-03-24 NOTE — PATIENT PROFILE ADULT - FALL HARM RISK - HARM RISK INTERVENTIONS

## 2022-03-24 NOTE — ED PROVIDER NOTE - CARE PLAN
Principal Discharge DX:	Septic shock  Secondary Diagnosis:	Pneumonitis  Secondary Diagnosis:	Acute UTI   1

## 2022-03-24 NOTE — ED ADULT NURSE NOTE - OBJECTIVE STATEMENT
pt is a 57 yo female biba from [facility] for nausea and vomiting with hypotension and hypoxia on RA per EMS. pt tachy, hypoxic, at 83% on RA; placed on 3 l nc sat 96%. febrile. pt I base line nonverbal contracted. noted pressure wound to hip and ankle. clean dressings intact.

## 2022-03-24 NOTE — ED PROVIDER NOTE - CLINICAL SUMMARY MEDICAL DECISION MAKING FREE TEXT BOX
.    57 y/o F pmh down syndrome, non-verbal, hypothyroidism, osteoporosis sent from [facility] vomiting x1d. + fever and hypoxia noted today. Aid notes pt is less active than usual but close to baseline.    PE: tired, ill appearing: NAD; HEAD: NCAT; ENT: Dry MM; NECK: supple; CARDIO: Reg tachycardia; PULM: No resp distress; ABD: s/nt; MSK: extrem non ttp; SKIN: + healing ulcer to R hip, clean edges no discharge, + ulcer to R plantar foot no discharge, clean edges; NEURO: moves all extrem. .    55 y/o F pmh down syndrome, non-verbal, hypothyroidism, osteoporosis sent from [facility] vomiting x1d. + fever and hypoxia noted today. Aid notes pt is less active than usual but close to baseline.    PE: tired, ill appearing: NAD; HEAD: NCAT; ENT: Dry MM; NECK: supple; CARDIO: Reg tachycardia; PULM: No resp distress; ABD: s/nt; MSK: extrem non ttp; SKIN: + healing ulcer to R hip, clean edges no discharge, + ulcer to R plantar foot no discharge, clean edges; NEURO: moves all extrem.    UA +. CT abd c/w cystitis; possible b/l aspirationpneumonitis. + elevated lactate. Pt given broad spect abx and about 2.5L IVF. At time of admission, pt became hypotensive, SBP 70's despite fluid resus. Central line placed with plan to start pressor. Pt's mental status remained unchanged.     Case discussed w/ pulm crit care. Pt admitted to ICU for septic shock, UTI for cont pressor support, IVF, IV abx, specialist consult, cardiac and intensive monitoring     .

## 2022-03-24 NOTE — ED PROVIDER NOTE - PROGRESS NOTE DETAILS
on reassessment pt found to be hypotensive despite appropriate fluid resuscitation, pt approve dto ICU by Dr. Shahid, Dr. Serrano updated with pts change in status, central line being place, pressors ordered  discussed case with ICU resident

## 2022-03-24 NOTE — CHART NOTE - NSCHARTNOTEFT_GEN_A_CORE
well known to me pt, sent to er from group home for fever, chills, shaking  covid neg, wbc nl, cxr- b/lat infiltrates  lactate elevated  check ua  will follow

## 2022-03-24 NOTE — H&P ADULT - HISTORY OF PRESENT ILLNESS
55y/o female with a pmhx of down syndrome, non-verbal, hypothyroidism, osteoporosis coming from [facility] here for eval multiple episodes of vomiting that began today. Pt was also found to be hypoxic and febrile. History was obtained from aid at bedside. This morning patient "vomited a lot", NBNB, then was found to be hypotensive and hypoxic to 86% on RA. Patient had been acting her usual self until then with no indication of distress. At baseline she sits in wheelchair, is nonverbal, and does not communicate in any way.    ED course: T 101, , BP min 71/36, 94% on 4L. UA positive. Initial lactate 2.2. CT showed bilateral interstitial pulmonary thickening, which may reflect aspiration pneumonitis, and bladder wall thickening.  She received azithromycin, ceftriaxone and 2.5L LR. Lactate improved to 1.8 but BP did not, so she was started on levophed.      Family: (foster mother): Ana Sterling, [phone]  Consumer advisory board: Amy Rich, [phone]

## 2022-03-24 NOTE — ED PROVIDER NOTE - OBJECTIVE STATEMENT
Pt is a 55y/o female with a pmhx of down syndrome, non-verbal, hypothyroidism, osteoporosis coming from [facility] here for eval multiple episodes of vomiting that began today. Pt was also found to be hypoxic and febrile. Pt unable to provide hx given pre-existing hx

## 2022-03-25 LAB
ALBUMIN SERPL ELPH-MCNC: 3.2 G/DL — LOW (ref 3.5–5.2)
ALP SERPL-CCNC: 106 U/L — SIGNIFICANT CHANGE UP (ref 30–115)
ALT FLD-CCNC: 14 U/L — SIGNIFICANT CHANGE UP (ref 0–41)
ANION GAP SERPL CALC-SCNC: 11 MMOL/L — SIGNIFICANT CHANGE UP (ref 7–14)
AST SERPL-CCNC: 22 U/L — SIGNIFICANT CHANGE UP (ref 0–41)
BASOPHILS # BLD AUTO: 0.05 K/UL — SIGNIFICANT CHANGE UP (ref 0–0.2)
BASOPHILS NFR BLD AUTO: 0.2 % — SIGNIFICANT CHANGE UP (ref 0–1)
BILIRUB SERPL-MCNC: 0.5 MG/DL — SIGNIFICANT CHANGE UP (ref 0.2–1.2)
BUN SERPL-MCNC: 10 MG/DL — SIGNIFICANT CHANGE UP (ref 10–20)
CALCIUM SERPL-MCNC: 8.3 MG/DL — LOW (ref 8.5–10.1)
CHLORIDE SERPL-SCNC: 104 MMOL/L — SIGNIFICANT CHANGE UP (ref 98–110)
CO2 SERPL-SCNC: 26 MMOL/L — SIGNIFICANT CHANGE UP (ref 17–32)
CREAT SERPL-MCNC: 0.7 MG/DL — SIGNIFICANT CHANGE UP (ref 0.7–1.5)
EGFR: 102 ML/MIN/1.73M2 — SIGNIFICANT CHANGE UP
EOSINOPHIL # BLD AUTO: 0.04 K/UL — SIGNIFICANT CHANGE UP (ref 0–0.7)
EOSINOPHIL NFR BLD AUTO: 0.2 % — SIGNIFICANT CHANGE UP (ref 0–8)
GLUCOSE SERPL-MCNC: 115 MG/DL — HIGH (ref 70–99)
HCT VFR BLD CALC: 38.2 % — SIGNIFICANT CHANGE UP (ref 37–47)
HGB BLD-MCNC: 12.5 G/DL — SIGNIFICANT CHANGE UP (ref 12–16)
IMM GRANULOCYTES NFR BLD AUTO: 0.6 % — HIGH (ref 0.1–0.3)
LEGIONELLA AG UR QL: NEGATIVE — SIGNIFICANT CHANGE UP
LYMPHOCYTES # BLD AUTO: 1.8 K/UL — SIGNIFICANT CHANGE UP (ref 1.2–3.4)
LYMPHOCYTES # BLD AUTO: 9 % — LOW (ref 20.5–51.1)
MCHC RBC-ENTMCNC: 31.8 PG — HIGH (ref 27–31)
MCHC RBC-ENTMCNC: 32.7 G/DL — SIGNIFICANT CHANGE UP (ref 32–37)
MCV RBC AUTO: 97.2 FL — SIGNIFICANT CHANGE UP (ref 81–99)
MONOCYTES # BLD AUTO: 0.69 K/UL — HIGH (ref 0.1–0.6)
MONOCYTES NFR BLD AUTO: 3.4 % — SIGNIFICANT CHANGE UP (ref 1.7–9.3)
MRSA PCR RESULT.: NEGATIVE — SIGNIFICANT CHANGE UP
MRSA PCR RESULT.: POSITIVE
NEUTROPHILS # BLD AUTO: 17.4 K/UL — HIGH (ref 1.4–6.5)
NEUTROPHILS NFR BLD AUTO: 86.6 % — HIGH (ref 42.2–75.2)
NRBC # BLD: 0 /100 WBCS — SIGNIFICANT CHANGE UP (ref 0–0)
PLATELET # BLD AUTO: 195 K/UL — SIGNIFICANT CHANGE UP (ref 130–400)
POTASSIUM SERPL-MCNC: 3.5 MMOL/L — SIGNIFICANT CHANGE UP (ref 3.5–5)
POTASSIUM SERPL-SCNC: 3.5 MMOL/L — SIGNIFICANT CHANGE UP (ref 3.5–5)
PROT SERPL-MCNC: 5.7 G/DL — LOW (ref 6–8)
RBC # BLD: 3.93 M/UL — LOW (ref 4.2–5.4)
RBC # FLD: 14.5 % — SIGNIFICANT CHANGE UP (ref 11.5–14.5)
SODIUM SERPL-SCNC: 141 MMOL/L — SIGNIFICANT CHANGE UP (ref 135–146)
WBC # BLD: 20.1 K/UL — HIGH (ref 4.8–10.8)
WBC # FLD AUTO: 20.1 K/UL — HIGH (ref 4.8–10.8)

## 2022-03-25 PROCEDURE — 71045 X-RAY EXAM CHEST 1 VIEW: CPT | Mod: 26

## 2022-03-25 PROCEDURE — 99223 1ST HOSP IP/OBS HIGH 75: CPT

## 2022-03-25 PROCEDURE — 93306 TTE W/DOPPLER COMPLETE: CPT | Mod: 26

## 2022-03-25 RX ORDER — SODIUM CHLORIDE 9 MG/ML
500 INJECTION, SOLUTION INTRAVENOUS ONCE
Refills: 0 | Status: COMPLETED | OUTPATIENT
Start: 2022-03-25 | End: 2022-03-25

## 2022-03-25 RX ORDER — PANTOPRAZOLE SODIUM 20 MG/1
40 TABLET, DELAYED RELEASE ORAL DAILY
Refills: 0 | Status: DISCONTINUED | OUTPATIENT
Start: 2022-03-25 | End: 2022-03-26

## 2022-03-25 RX ADMIN — MIDODRINE HYDROCHLORIDE 10 MILLIGRAM(S): 2.5 TABLET ORAL at 13:43

## 2022-03-25 RX ADMIN — GABAPENTIN 300 MILLIGRAM(S): 400 CAPSULE ORAL at 18:30

## 2022-03-25 RX ADMIN — Medication 1 APPLICATION(S): at 18:30

## 2022-03-25 RX ADMIN — Medication 100 MILLIGRAM(S): at 17:34

## 2022-03-25 RX ADMIN — RALOXIFENE HYDROCHLORIDE 60 MILLIGRAM(S): 60 TABLET, COATED ORAL at 13:43

## 2022-03-25 RX ADMIN — SODIUM CHLORIDE 1000 MILLILITER(S): 9 INJECTION, SOLUTION INTRAVENOUS at 08:31

## 2022-03-25 RX ADMIN — Medication 1 APPLICATION(S): at 05:03

## 2022-03-25 RX ADMIN — Medication 250 MILLIGRAM(S): at 06:40

## 2022-03-25 RX ADMIN — Medication 100 MILLIGRAM(S): at 05:03

## 2022-03-25 RX ADMIN — Medication 1 TABLET(S): at 13:43

## 2022-03-25 RX ADMIN — CHLORHEXIDINE GLUCONATE 1 APPLICATION(S): 213 SOLUTION TOPICAL at 05:02

## 2022-03-25 RX ADMIN — CEFEPIME 100 MILLIGRAM(S): 1 INJECTION, POWDER, FOR SOLUTION INTRAMUSCULAR; INTRAVENOUS at 22:01

## 2022-03-25 RX ADMIN — ENOXAPARIN SODIUM 40 MILLIGRAM(S): 100 INJECTION SUBCUTANEOUS at 00:08

## 2022-03-25 RX ADMIN — Medication 100 MILLIGRAM(S): at 22:00

## 2022-03-25 RX ADMIN — Medication 250 MILLIGRAM(S): at 18:30

## 2022-03-25 RX ADMIN — PANTOPRAZOLE SODIUM 40 MILLIGRAM(S): 20 TABLET, DELAYED RELEASE ORAL at 13:43

## 2022-03-25 RX ADMIN — Medication 5 MILLIGRAM(S): at 22:00

## 2022-03-25 RX ADMIN — MIDODRINE HYDROCHLORIDE 10 MILLIGRAM(S): 2.5 TABLET ORAL at 18:30

## 2022-03-25 RX ADMIN — CEFEPIME 100 MILLIGRAM(S): 1 INJECTION, POWDER, FOR SOLUTION INTRAMUSCULAR; INTRAVENOUS at 17:33

## 2022-03-25 RX ADMIN — CEFEPIME 100 MILLIGRAM(S): 1 INJECTION, POWDER, FOR SOLUTION INTRAMUSCULAR; INTRAVENOUS at 06:07

## 2022-03-25 NOTE — DIETITIAN INITIAL EVALUATION ADULT. - ENTER TO (CAL/KG)
Last office visit  12/19/2016  Next Appointment  None scheduled  Last Refill    estradiol (ESTRACE) 1 MG tablet 90 tablet 1 7/19/2016       Sig: TAKE ONE TABLET BY MOUTH ONE TIME DAILY        25

## 2022-03-25 NOTE — PROGRESS NOTE ADULT - SUBJECTIVE AND OBJECTIVE BOX
TEA ECHAVARRIA 56y Female  MRN#: [id number]   CODE STATUS:________    Hospital Day: 1d    Pt is currently admitted with the primary diagnosis of septic shock 2/2 possible aspiration PNA    SUBJECTIVE  Hospital Course:   55y/o female with a pmhx of down syndrome, non-verbal, hypothyroidism, osteoporosis coming from [facility] here for eval multiple episodes of vomiting that began today. Pt was also found to be hypoxic and febrile. History was obtained from aid at bedside. This morning patient "vomited a lot", NBNB, then was found to be hypotensive and hypoxic to 86% on RA. Patient had been acting her usual self until then with no indication of distress. At baseline she sits in wheelchair, is nonverbal, and does not communicate in any way.    ED course: T 101, , BP min 71/36, 94% on 4L. UA positive. Initial lactate 2.2. CT showed bilateral interstitial pulmonary thickening, which may reflect aspiration pneumonitis, and bladder wall thickening.  She received azithromycin, ceftriaxone and 2.5L LR. Lactate improved to 1.8 but BP did not, so she was started on levophed.      Overnight events: No overnight events   ----------------------------------------------------------  OBJECTIVE  PAST MEDICAL & SURGICAL HISTORY  Down syndrome    Osteoporosis    Mild anemia    Neuropathy    S/P debridement  of R hip on 3/2/21                                              -----------------------------------------------------------  ALLERGIES:  No Known Allergies                                            ------------------------------------------------------------    HOME MEDICATIONS  Home Medications:  ascorbic acid 500 mg oral tablet: 1 tab(s) orally once a day (29 Mar 2021 09:58)  collagenase 250 units/g topical ointment: 1 application topically 2 times a day (29 Mar 2021 09:58)  Ferrex-150 oral capsule: 1 cap(s) orally once a day (24 Mar 2022 20:45)  gabapentin 300 mg oral capsule: 1 cap(s) orally 2 times a day (24 Mar 2022 20:43)  melatonin 5 mg oral tablet: 1 tab(s) orally once a day (at bedtime) (19 Mar 2021 10:22)  midodrine 10 mg oral tablet: 1 tab(s) orally 3 times a day (29 Mar 2021 09:58)  Multiple Vitamins oral tablet: 1 tab(s) orally once a day (29 Mar 2021 09:58)  raloxifene 60 mg oral tablet: 1 tab(s) orally once a day (19 Mar 2021 10:22)  sodium hypochlorite 0.25% topical solution: 1 application topically 2 times a day (29 Mar 2021 09:58)  tiZANidine 2 mg oral tablet: 1 tab(s) orally 2 times a day (24 Mar 2022 20:42)  Vitamin D3 2000 intl units (50 mcg) oral capsule: 1 tab(s) orally once a day with meal (19 Mar 2021 10:22)                           MEDICATIONS:  STANDING MEDICATIONS  cefepime   IVPB 2000 milliGRAM(s) IV Intermittent every 8 hours  chlorhexidine 4% Liquid 1 Application(s) Topical <User Schedule>  collagenase Ointment 1 Application(s) Topical two times a day  enoxaparin Injectable 40 milliGRAM(s) SubCutaneous every 24 hours  gabapentin Solution 300 milliGRAM(s) Oral every 12 hours  melatonin 5 milliGRAM(s) Oral at bedtime  metroNIDAZOLE  IVPB 500 milliGRAM(s) IV Intermittent every 8 hours  midodrine. 10 milliGRAM(s) Oral three times a day  multivitamin 1 Tablet(s) Oral daily  norepinephrine Infusion 0.05 MICROgram(s)/kG/Min IV Continuous <Continuous>  pantoprazole  Injectable 40 milliGRAM(s) IV Push daily  raloxifene 60 milliGRAM(s) Oral daily  vancomycin  IVPB 1000 milliGRAM(s) IV Intermittent every 12 hours    PRN MEDICATIONS                                            ------------------------------------------------------------  VITAL SIGNS: Last 24 Hours  T(C): 37.9 (25 Mar 2022 04:00), Max: 38.3 (24 Mar 2022 12:06)  T(F): 100.2 (25 Mar 2022 04:00), Max: 101 (24 Mar 2022 12:06)  HR: 80 (25 Mar 2022 07:) (74 - 115)  BP: 96/68 (25 Mar 2022 07:) (71/36 - 164/71)  BP(mean): 84 (25 Mar 2022 07:) (55 - 112)  RR: 18 (25 Mar 2022 07:) (6 - 63)  SpO2: 96% (25 Mar 2022 07:) (88% - 100%)      22 @ 07:01  -  22 @ 07:00  --------------------------------------------------------  IN: 1123.7 mL / OUT:  mL / NET: -901.3 mL    22 @ 07:01  -  22 @ 08:37  --------------------------------------------------------  IN: 0 mL / OUT: 300 mL / NET: -300 mL                                             --------------------------------------------------------------  LABS:                        12.5   20.10 )-----------( 195      ( 25 Mar 2022 04:20 )             38.2         141  |  104  |  10  ----------------------------<  115<H>  3.5   |  26  |  0.7    Ca    8.3<L>      25 Mar 2022 04:20    TPro  5.7<L>  /  Alb  3.2<L>  /  TBili  0.5  /  DBili  x   /  AST  22  /  ALT  14  /  AlkPhos  106      PT/INR - ( 24 Mar 2022 12:36 )   PT: 12.60 sec;   INR: 1.10 ratio         PTT - ( 24 Mar 2022 12:36 )  PTT:22.3 sec  Urinalysis Basic - ( 24 Mar 2022 12:36 )    Color: Light Yellow / Appearance: Clear / S.014 / pH: x  Gluc: x / Ketone: Negative  / Bili: Negative / Urobili: <2 mg/dL   Blood: x / Protein: Negative / Nitrite: Negative   Leuk Esterase: Large / RBC: 2 /HPF /  /HPF   Sq Epi: x / Non Sq Epi: 0 /HPF / Bacteria: Negative        Lactate, Blood: 1.8 mmol/L (22 @ 15:33)  Lactate, Blood: 2.3 mmol/L *H* (22 @ 12:36)                                                      -------------------------------------------------------------  RADIOLOGY:                                            --------------------------------------------------------------    PHYSICAL EXAM:  General:   HEENT:  LUNGS:  HEART:  ABDOMEN:  EXT:  NEURO:  SKIN:                                           --------------------------------------------------------------    ASSESSMENT & PLAN    Past medical history and hospital course     #Septic shock on pressors POA  #Acute hypoxic respiratory failure  #Aspiration pneumonia/pneumonitis  #Acute cystitis  - Continue Vancomycin, Cefepmime, Flgyl  - Send MRSA, if (-) can stop Vanco  - F/u BCx and Urinary Cx  - F/u procal, sputum culutre, Strep/Legionella Ag  - Taper Pressors as tolerated  - Bed side speech/swallow assesment        #Systolic Murmur 2/2 possible AS  - Obtain ECHO                                                                                                        ----------------------------------------------------  # DVT prophylaxis     # GI prophylaxis     # Diet     # Activity Score (AM-PAC)    # Code status     # Disposition                                                                              --------------------------------------------------------    # Handoff      TEA ECHAVARRIA 56y Female  MRN#: [id number]   CODE STATUS:________    Hospital Day: 1d    Pt is currently admitted with the primary diagnosis of septic shock 2/2 possible aspiration PNA    SUBJECTIVE  Hospital Course:   55y/o female with a pmhx of down syndrome, non-verbal, hypothyroidism, osteoporosis coming from [facility] here for eval multiple episodes of vomiting that began today. Pt was also found to be hypoxic and febrile. History was obtained from aid at bedside. This morning patient "vomited a lot", NBNB, then was found to be hypotensive and hypoxic to 86% on RA. Patient had been acting her usual self until then with no indication of distress. At baseline she sits in wheelchair, is nonverbal, and does not communicate in any way.    ED course: T 101, , BP min 71/36, 94% on 4L. UA positive. Initial lactate 2.2. CT showed bilateral interstitial pulmonary thickening, which may reflect aspiration pneumonitis, and bladder wall thickening.  She received azithromycin, ceftriaxone and 2.5L LR. Lactate improved to 1.8 but BP did not, so she was started on levophed.      Overnight events: No overnight events   ----------------------------------------------------------  OBJECTIVE  PAST MEDICAL & SURGICAL HISTORY  Down syndrome    Osteoporosis    Mild anemia    Neuropathy    S/P debridement  of R hip on 3/2/21                                              -----------------------------------------------------------  ALLERGIES:  No Known Allergies                                            ------------------------------------------------------------    HOME MEDICATIONS  Home Medications:  ascorbic acid 500 mg oral tablet: 1 tab(s) orally once a day (29 Mar 2021 09:58)  collagenase 250 units/g topical ointment: 1 application topically 2 times a day (29 Mar 2021 09:58)  Ferrex-150 oral capsule: 1 cap(s) orally once a day (24 Mar 2022 20:45)  gabapentin 300 mg oral capsule: 1 cap(s) orally 2 times a day (24 Mar 2022 20:43)  melatonin 5 mg oral tablet: 1 tab(s) orally once a day (at bedtime) (19 Mar 2021 10:22)  midodrine 10 mg oral tablet: 1 tab(s) orally 3 times a day (29 Mar 2021 09:58)  Multiple Vitamins oral tablet: 1 tab(s) orally once a day (29 Mar 2021 09:58)  raloxifene 60 mg oral tablet: 1 tab(s) orally once a day (19 Mar 2021 10:22)  sodium hypochlorite 0.25% topical solution: 1 application topically 2 times a day (29 Mar 2021 09:58)  tiZANidine 2 mg oral tablet: 1 tab(s) orally 2 times a day (24 Mar 2022 20:42)  Vitamin D3 2000 intl units (50 mcg) oral capsule: 1 tab(s) orally once a day with meal (19 Mar 2021 10:22)                           MEDICATIONS:  STANDING MEDICATIONS  cefepime   IVPB 2000 milliGRAM(s) IV Intermittent every 8 hours  chlorhexidine 4% Liquid 1 Application(s) Topical <User Schedule>  collagenase Ointment 1 Application(s) Topical two times a day  enoxaparin Injectable 40 milliGRAM(s) SubCutaneous every 24 hours  gabapentin Solution 300 milliGRAM(s) Oral every 12 hours  melatonin 5 milliGRAM(s) Oral at bedtime  metroNIDAZOLE  IVPB 500 milliGRAM(s) IV Intermittent every 8 hours  midodrine. 10 milliGRAM(s) Oral three times a day  multivitamin 1 Tablet(s) Oral daily  norepinephrine Infusion 0.05 MICROgram(s)/kG/Min IV Continuous <Continuous>  pantoprazole  Injectable 40 milliGRAM(s) IV Push daily  raloxifene 60 milliGRAM(s) Oral daily  vancomycin  IVPB 1000 milliGRAM(s) IV Intermittent every 12 hours    PRN MEDICATIONS                                            ------------------------------------------------------------  VITAL SIGNS: Last 24 Hours  T(C): 37.9 (25 Mar 2022 04:00), Max: 38.3 (24 Mar 2022 12:06)  T(F): 100.2 (25 Mar 2022 04:00), Max: 101 (24 Mar 2022 12:06)  HR: 80 (25 Mar 2022 07:) (74 - 115)  BP: 96/68 (25 Mar 2022 07:) (71/36 - 164/71)  BP(mean): 84 (25 Mar 2022 07:) (55 - 112)  RR: 18 (25 Mar 2022 07:) (6 - 63)  SpO2: 96% (25 Mar 2022 07:) (88% - 100%)      22 @ 07:01  -  22 @ 07:00  --------------------------------------------------------  IN: 1123.7 mL / OUT:  mL / NET: -901.3 mL    22 @ 07:01  -  22 @ 08:37  --------------------------------------------------------  IN: 0 mL / OUT: 300 mL / NET: -300 mL                                             --------------------------------------------------------------  LABS:                        12.5   20.10 )-----------( 195      ( 25 Mar 2022 04:20 )             38.2         141  |  104  |  10  ----------------------------<  115<H>  3.5   |  26  |  0.7    Ca    8.3<L>      25 Mar 2022 04:20    TPro  5.7<L>  /  Alb  3.2<L>  /  TBili  0.5  /  DBili  x   /  AST  22  /  ALT  14  /  AlkPhos  106      PT/INR - ( 24 Mar 2022 12:36 )   PT: 12.60 sec;   INR: 1.10 ratio         PTT - ( 24 Mar 2022 12:36 )  PTT:22.3 sec  Urinalysis Basic - ( 24 Mar 2022 12:36 )    Color: Light Yellow / Appearance: Clear / S.014 / pH: x  Gluc: x / Ketone: Negative  / Bili: Negative / Urobili: <2 mg/dL   Blood: x / Protein: Negative / Nitrite: Negative   Leuk Esterase: Large / RBC: 2 /HPF /  /HPF   Sq Epi: x / Non Sq Epi: 0 /HPF / Bacteria: Negative        Lactate, Blood: 1.8 mmol/L (22 @ 15:33)  Lactate, Blood: 2.3 mmol/L *H* (22 @ 12:36)                                                      -------------------------------------------------------------  RADIOLOGY:  CT Abd/Pelvis w/ IV contrast ():   Bilateral interstitial pulmonary thickening, right greater than right.   They may reflect chronic aspirationpneumonitis in the appropriate   clinical context.  There appears to be bladder wall thickening. Rule out cystitis  Degenerative changes of the spine.  Hepatic steatosis.                                            --------------------------------------------------------------    PHYSICAL EXAM:  GENERAL: NAD, lying in bed comfortably  HEAD: Atraumatic, Normocephalic  EYES: EOMI, PERRLA, conjunctiva and sclera clear  ENT: Dry mucous membranes  NECK: Supple, No JVD  CHEST/LUNG: Clear to auscultation bilaterally; No rales, rhonchi, wheezing, or rubs. Unlabored respirations  HEART: Regular rate and rhythm; Systolic murmur heard best over aortic area  ABDOMEN: Bowel sounds present; Nontender  EXTREMITIES: Sluggish capillary refill. No clubbing, cyanosis, or edema  NERVOUS SYSTEM: Nonverbal, does not follow commands                                           --------------------------------------------------------------    ASSESSMENT & PLAN    55y/o female with a pmhx of down syndrome, non-verbal, hypothyroidism, osteoporosis presents after multiple episodes of vomiting admitted for septic shock.    #Septic shock on pressors POA  #Acute hypoxic respiratory failure  #Aspiration pneumonia/pneumonitis  #Acute cystitis  - Continue Vancomycin, Cefepime Flagyl  - Send MRSA, if (-) can stop Vanco  - F/u BCx and Urinary Cx  - F/u procal, sputum culture Strep/Legionella Ag  - Taper Pressors as tolerated  - Bed side speech/swallow assessment by nurse, if not complaint send consult      #Systolic Murmur 2/2 possible AS  - Obtain ECHO                                                                                  ----------------------------------------------------  # DVT prophylaxis: On lovenox    # GI prophylaxis     # Diet     # Activity Score (AM-PAC)    # Code status     # Disposition                                                                              --------------------------------------------------------    # Handoff      TEA ECHAVARRIA 56y Female  MRN#: [id number]   CODE STATUS:________    Hospital Day: 1d    Pt is currently admitted with the primary diagnosis of septic shock 2/2 possible aspiration PNA    SUBJECTIVE  Hospital Course:   57y/o female with a pmhx of down syndrome, non-verbal, hypothyroidism, osteoporosis coming from [facility] here for eval multiple episodes of vomiting that began today. Pt was also found to be hypoxic and febrile. History was obtained from aid at bedside. This morning patient "vomited a lot", NBNB, then was found to be hypotensive and hypoxic to 86% on RA. Patient had been acting her usual self until then with no indication of distress. At baseline she sits in wheelchair, is nonverbal, and does not communicate in any way.    ED course: T 101, , BP min 71/36, 94% on 4L. UA positive. Initial lactate 2.2. CT showed bilateral interstitial pulmonary thickening, which may reflect aspiration pneumonitis, and bladder wall thickening.  She received azithromycin, ceftriaxone and 2.5L LR. Lactate improved to 1.8 but BP did not, so she was started on levophed.      Overnight events: No overnight events   ----------------------------------------------------------  OBJECTIVE  PAST MEDICAL & SURGICAL HISTORY  Down syndrome    Osteoporosis    Mild anemia    Neuropathy    S/P debridement  of R hip on 3/2/21                                              -----------------------------------------------------------  ALLERGIES:  No Known Allergies                                            ------------------------------------------------------------    HOME MEDICATIONS  Home Medications:  ascorbic acid 500 mg oral tablet: 1 tab(s) orally once a day (29 Mar 2021 09:58)  collagenase 250 units/g topical ointment: 1 application topically 2 times a day (29 Mar 2021 09:58)  Ferrex-150 oral capsule: 1 cap(s) orally once a day (24 Mar 2022 20:45)  gabapentin 300 mg oral capsule: 1 cap(s) orally 2 times a day (24 Mar 2022 20:43)  melatonin 5 mg oral tablet: 1 tab(s) orally once a day (at bedtime) (19 Mar 2021 10:22)  midodrine 10 mg oral tablet: 1 tab(s) orally 3 times a day (29 Mar 2021 09:58)  Multiple Vitamins oral tablet: 1 tab(s) orally once a day (29 Mar 2021 09:58)  raloxifene 60 mg oral tablet: 1 tab(s) orally once a day (19 Mar 2021 10:22)  sodium hypochlorite 0.25% topical solution: 1 application topically 2 times a day (29 Mar 2021 09:58)  tiZANidine 2 mg oral tablet: 1 tab(s) orally 2 times a day (24 Mar 2022 20:42)  Vitamin D3 2000 intl units (50 mcg) oral capsule: 1 tab(s) orally once a day with meal (19 Mar 2021 10:22)                           MEDICATIONS:  STANDING MEDICATIONS  cefepime   IVPB 2000 milliGRAM(s) IV Intermittent every 8 hours  chlorhexidine 4% Liquid 1 Application(s) Topical <User Schedule>  collagenase Ointment 1 Application(s) Topical two times a day  enoxaparin Injectable 40 milliGRAM(s) SubCutaneous every 24 hours  gabapentin Solution 300 milliGRAM(s) Oral every 12 hours  melatonin 5 milliGRAM(s) Oral at bedtime  metroNIDAZOLE  IVPB 500 milliGRAM(s) IV Intermittent every 8 hours  midodrine. 10 milliGRAM(s) Oral three times a day  multivitamin 1 Tablet(s) Oral daily  norepinephrine Infusion 0.05 MICROgram(s)/kG/Min IV Continuous <Continuous>  pantoprazole  Injectable 40 milliGRAM(s) IV Push daily  raloxifene 60 milliGRAM(s) Oral daily  vancomycin  IVPB 1000 milliGRAM(s) IV Intermittent every 12 hours    PRN MEDICATIONS                                            ------------------------------------------------------------  VITAL SIGNS: Last 24 Hours  T(C): 37.9 (25 Mar 2022 04:00), Max: 38.3 (24 Mar 2022 12:06)  T(F): 100.2 (25 Mar 2022 04:00), Max: 101 (24 Mar 2022 12:06)  HR: 80 (25 Mar 2022 07:) (74 - 115)  BP: 96/68 (25 Mar 2022 07:) (71/36 - 164/71)  BP(mean): 84 (25 Mar 2022 07:) (55 - 112)  RR: 18 (25 Mar 2022 07:) (6 - 63)  SpO2: 96% (25 Mar 2022 07:) (88% - 100%)      22 @ 07:01  -  22 @ 07:00  --------------------------------------------------------  IN: 1123.7 mL / OUT:  mL / NET: -901.3 mL    22 @ 07:01  -  22 @ 08:37  --------------------------------------------------------  IN: 0 mL / OUT: 300 mL / NET: -300 mL                                             --------------------------------------------------------------  LABS:                        12.5   20.10 )-----------( 195      ( 25 Mar 2022 04:20 )             38.2         141  |  104  |  10  ----------------------------<  115<H>  3.5   |  26  |  0.7    Ca    8.3<L>      25 Mar 2022 04:20    TPro  5.7<L>  /  Alb  3.2<L>  /  TBili  0.5  /  DBili  x   /  AST  22  /  ALT  14  /  AlkPhos  106      PT/INR - ( 24 Mar 2022 12:36 )   PT: 12.60 sec;   INR: 1.10 ratio         PTT - ( 24 Mar 2022 12:36 )  PTT:22.3 sec  Urinalysis Basic - ( 24 Mar 2022 12:36 )    Color: Light Yellow / Appearance: Clear / S.014 / pH: x  Gluc: x / Ketone: Negative  / Bili: Negative / Urobili: <2 mg/dL   Blood: x / Protein: Negative / Nitrite: Negative   Leuk Esterase: Large / RBC: 2 /HPF /  /HPF   Sq Epi: x / Non Sq Epi: 0 /HPF / Bacteria: Negative        Lactate, Blood: 1.8 mmol/L (22 @ 15:33)  Lactate, Blood: 2.3 mmol/L *H* (22 @ 12:36)                                                      -------------------------------------------------------------  RADIOLOGY:  CT Abd/Pelvis w/ IV contrast ():   Bilateral interstitial pulmonary thickening, right greater than right.   They may reflect chronic aspirationpneumonitis in the appropriate   clinical context.  There appears to be bladder wall thickening. Rule out cystitis  Degenerative changes of the spine.  Hepatic steatosis.                                            --------------------------------------------------------------    PHYSICAL EXAM:  GENERAL: NAD, lying in bed comfortably  HEAD: Atraumatic, Normocephalic  EYES: EOMI, PERRLA, conjunctiva and sclera clear  ENT: Dry mucous membranes  NECK: Supple, No JVD  CHEST/LUNG: Clear to auscultation bilaterally; No rales, rhonchi, wheezing, or rubs. Unlabored respirations  HEART: Regular rate and rhythm; Systolic murmur heard best over aortic area  ABDOMEN: Bowel sounds present; Nontender  EXTREMITIES: Sluggish capillary refill. No clubbing, cyanosis, or edema  NERVOUS SYSTEM: Nonverbal, does not follow commands                                           --------------------------------------------------------------    ASSESSMENT & PLAN    57y/o female with a pmhx of down syndrome, non-verbal, hypothyroidism, osteoporosis presents after multiple episodes of vomiting admitted for septic shock.    #Septic shock on pressors POA  #Acute hypoxic respiratory failure  #Aspiration pneumonia/pneumonitis  #Acute cystitis  - Continue Vancomycin, Cefepime Flagyl  - Send MRSA, if (-) can stop Vanco  - F/u BCx and Urinary Cx  - F/u procal, sputum culture Strep/Legionella Ag  - Taper Pressors as tolerated  - Bed side speech/swallow assessment by nurse, if not complaint send consult      #Systolic Murmur 2/2 possible AS  - Obtain ECHO    #Osteoporosis  -Continue Raloxifene    #Neuropathy  - Continue Gabapentin    #Downs Syndrome  - Non verbal, Wheelchair bound                                                                                ----------------------------------------------------  # DVT prophylaxis: On lovenox    # GI prophylaxis     # Diet     # Activity Score (AM-PAC)    # Code status     # Disposition                                                                              --------------------------------------------------------    # Handoff      TEA ECHAVARRIA 56y Female  MRN#: [id number]   CODE STATUS: Full code    Hospital Day: 1d    Pt is currently admitted with the primary diagnosis of septic shock 2/2 possible aspiration PNA    SUBJECTIVE  Hospital Course:   57y/o female with a pmhx of down syndrome, non-verbal, hypothyroidism, osteoporosis coming from [facility] here for eval multiple episodes of vomiting that began today. Pt was also found to be hypoxic and febrile. History was obtained from aid at bedside. This morning patient "vomited a lot", NBNB, then was found to be hypotensive and hypoxic to 86% on RA. Patient had been acting her usual self until then with no indication of distress. At baseline she sits in wheelchair, is nonverbal, and does not communicate in any way.    ED course: T 101, , BP min 71/36, 94% on 4L. UA positive. Initial lactate 2.2. CT showed bilateral interstitial pulmonary thickening, which may reflect aspiration pneumonitis, and bladder wall thickening.  She received azithromycin, ceftriaxone and 2.5L LR. Lactate improved to 1.8 but BP did not, so she was started on levophed.      Overnight events: No overnight events   ----------------------------------------------------------  OBJECTIVE  PAST MEDICAL & SURGICAL HISTORY  Down syndrome    Osteoporosis    Mild anemia    Neuropathy    S/P debridement  of R hip on 3/2/21                                              -----------------------------------------------------------  ALLERGIES:  No Known Allergies                                            ------------------------------------------------------------    HOME MEDICATIONS  Home Medications:  ascorbic acid 500 mg oral tablet: 1 tab(s) orally once a day (29 Mar 2021 09:58)  collagenase 250 units/g topical ointment: 1 application topically 2 times a day (29 Mar 2021 09:58)  Ferrex-150 oral capsule: 1 cap(s) orally once a day (24 Mar 2022 20:45)  gabapentin 300 mg oral capsule: 1 cap(s) orally 2 times a day (24 Mar 2022 20:43)  melatonin 5 mg oral tablet: 1 tab(s) orally once a day (at bedtime) (19 Mar 2021 10:22)  midodrine 10 mg oral tablet: 1 tab(s) orally 3 times a day (29 Mar 2021 09:58)  Multiple Vitamins oral tablet: 1 tab(s) orally once a day (29 Mar 2021 09:58)  raloxifene 60 mg oral tablet: 1 tab(s) orally once a day (19 Mar 2021 10:22)  sodium hypochlorite 0.25% topical solution: 1 application topically 2 times a day (29 Mar 2021 09:58)  tiZANidine 2 mg oral tablet: 1 tab(s) orally 2 times a day (24 Mar 2022 20:42)  Vitamin D3 2000 intl units (50 mcg) oral capsule: 1 tab(s) orally once a day with meal (19 Mar 2021 10:22)                           MEDICATIONS:  STANDING MEDICATIONS  cefepime   IVPB 2000 milliGRAM(s) IV Intermittent every 8 hours  chlorhexidine 4% Liquid 1 Application(s) Topical <User Schedule>  collagenase Ointment 1 Application(s) Topical two times a day  enoxaparin Injectable 40 milliGRAM(s) SubCutaneous every 24 hours  gabapentin Solution 300 milliGRAM(s) Oral every 12 hours  melatonin 5 milliGRAM(s) Oral at bedtime  metroNIDAZOLE  IVPB 500 milliGRAM(s) IV Intermittent every 8 hours  midodrine. 10 milliGRAM(s) Oral three times a day  multivitamin 1 Tablet(s) Oral daily  norepinephrine Infusion 0.05 MICROgram(s)/kG/Min IV Continuous <Continuous>  pantoprazole  Injectable 40 milliGRAM(s) IV Push daily  raloxifene 60 milliGRAM(s) Oral daily  vancomycin  IVPB 1000 milliGRAM(s) IV Intermittent every 12 hours    PRN MEDICATIONS                                            ------------------------------------------------------------  VITAL SIGNS: Last 24 Hours  T(C): 37.9 (25 Mar 2022 04:00), Max: 38.3 (24 Mar 2022 12:06)  T(F): 100.2 (25 Mar 2022 04:00), Max: 101 (24 Mar 2022 12:06)  HR: 80 (25 Mar 2022 07:00) (74 - 115)  BP: 96/68 (25 Mar 2022 07:00) (71/36 - 164/71)  BP(mean): 84 (25 Mar 2022 07:) (55 - 112)  RR: 18 (25 Mar 2022 07:) (6 - 63)  SpO2: 96% (25 Mar 2022 07:) (88% - 100%)      22 @ 07:01  -  22 @ 07:00  --------------------------------------------------------  IN: 1123.7 mL / OUT:  mL / NET: -901.3 mL    22 @ 07:01  -  22 @ 08:37  --------------------------------------------------------  IN: 0 mL / OUT: 300 mL / NET: -300 mL                                             --------------------------------------------------------------  LABS:                        12.5   20.10 )-----------( 195      ( 25 Mar 2022 04:20 )             38.2     03    141  |  104  |  10  ----------------------------<  115<H>  3.5   |  26  |  0.7    Ca    8.3<L>      25 Mar 2022 04:20    TPro  5.7<L>  /  Alb  3.2<L>  /  TBili  0.5  /  DBili  x   /  AST  22  /  ALT  14  /  AlkPhos  106      PT/INR - ( 24 Mar 2022 12:36 )   PT: 12.60 sec;   INR: 1.10 ratio         PTT - ( 24 Mar 2022 12:36 )  PTT:22.3 sec  Urinalysis Basic - ( 24 Mar 2022 12:36 )    Color: Light Yellow / Appearance: Clear / S.014 / pH: x  Gluc: x / Ketone: Negative  / Bili: Negative / Urobili: <2 mg/dL   Blood: x / Protein: Negative / Nitrite: Negative   Leuk Esterase: Large / RBC: 2 /HPF /  /HPF   Sq Epi: x / Non Sq Epi: 0 /HPF / Bacteria: Negative        Lactate, Blood: 1.8 mmol/L (22 @ 15:33)  Lactate, Blood: 2.3 mmol/L *H* (22 @ 12:36)                                                      -------------------------------------------------------------  RADIOLOGY:  CT Abd/Pelvis w/ IV contrast ():   Bilateral interstitial pulmonary thickening, right greater than right.   They may reflect chronic aspiration pneumonitis in the appropriate   clinical context.  There appears to be bladder wall thickening. Rule out cystitis  Degenerative changes of the spine.  Hepatic steatosis.                                            --------------------------------------------------------------    PHYSICAL EXAM:  GENERAL: NAD, lying in bed comfortably  HEAD: Atraumatic, Normocephalic  EYES: EOMI, PERRLA, conjunctiva and sclera clear  ENT: Dry mucous membranes  NECK: Supple, No JVD  CHEST/LUNG: Clear to auscultation bilaterally; No rales, rhonchi, wheezing, or rubs. Unlabored respirations  HEART: Regular rate and rhythm; Systolic murmur heard best over aortic area  ABDOMEN: Bowel sounds present; Nontender  EXTREMITIES: Sluggish capillary refill. No clubbing, cyanosis, or edema  NERVOUS SYSTEM: Nonverbal, does not follow commands                                           --------------------------------------------------------------    ASSESSMENT & PLAN    57y/o female with a pmhx of down syndrome, non-verbal, hypothyroidism, osteoporosis presents after multiple episodes of vomiting admitted for septic shock.    #Septic shock on pressors POA  #Acute hypoxic respiratory failure  #Aspiration pneumonia/pneumonitis  #Acute cystitis  - Continue Vancomycin, Cefepime, Flagyl  - Send MRSA, if (-) can stop Vanco  - F/u BCx and Urinary Cx  - F/u procal, sputum culture Strep/Legionella Ag  - Taper Pressors as tolerated  - Bedside speech/swallow assessment by nurse, if patient fails will do speech and swallow consult    #Systolic Murmur 2/2 possible AS  - Obtain ECHO    #Osteoporosis  -Continue Raloxifene    #Neuropathy  - Continue Gabapentin    #Downs Syndrome  - Non verbal, Wheelchair bound                                                                                ----------------------------------------------------  # DVT prophylaxis: On lovenox  # GI prophylaxis: Protonix   # Diet: NPO for now   # Code status: Full code   # Disposition: MICU, likely downgrade tomorrow

## 2022-03-25 NOTE — DIETITIAN INITIAL EVALUATION ADULT. - ORAL INTAKE PTA/DIET HISTORY
Pt non-verbal. RD was able to reach pt's previous nurse via [phone], who cared for pt in the past when pt was ambulatory. She states pt used to eat pureed food. No food allergy/intolerance. No dietary restrictions. JARVIS unable to reach pt's current nurse via [phone] to obtain updated info.   Per chart from previous conversation with homecare RN on 3/21/2021, reported -110lbs.

## 2022-03-25 NOTE — DIETITIAN INITIAL EVALUATION ADULT. - OTHER INFO
Patient is a 57y/o female with a pmhx of down syndrome, non-verbal, hypothyroidism, osteoporosis presents after multiple episodes of vomiting admitted for septic shock.    ICU Vital Signs Last 24 Hrs  T(C): 37.7 (25 Mar 2022 08:00), Max: 38.1 (24 Mar 2022 21:00)  T(F): 99.9 (25 Mar 2022 08:00), Max: 100.5 (24 Mar 2022 21:00)  HR: 76 (25 Mar 2022 10:00) (74 - 114)  BP: 97/60 (25 Mar 2022 10:00) (71/36 - 164/71)  BP(mean): 84 (25 Mar 2022 10:00) (55 - 112)  ABP: --  ABP(mean): --  RR: 22 (25 Mar 2022 10:00) (6 - 63)  SpO2: 97% (25 Mar 2022 10:00) (88% - 100%)    I&O's Detail  24 Mar 2022 07:01  -  25 Mar 2022 07:00  --------------------------------------------------------  IN:    IV PiggyBack: 550 mL    Lactated Ringers Bolus: 500 mL    Norepinephrine: 73.7 mL  Total IN: 1123.7 mL    OUT:    Indwelling Catheter - Urethral (mL): 2025 mL  Total OUT: 2025 mL    Total NET: -901.3 mL     S+S 3/25  · Recommended Consistencies	Puree diet, NO liquids  · Recommended Diagnostics	VFSS/MBS; Consider MBS assessment if s/s aspiration at bedside do not resolve  · Recommended Feeding/Eating Techniques	allow for swallow between intakes; maintain upright posture during/after eating for 30 mins; position upright (90 degrees)

## 2022-03-25 NOTE — ADVANCED PRACTICE NURSE CONSULT - ASSESSMENT
Patient is a 55y/o female with a pmhx of down syndrome, non-verbal, hypothyroidism, osteoporosis coming from [facility] here for eval multiple episodes of vomiting that began today. Pt was also found to be hypoxic and febrile. History was obtained from aid at bedside. This morning patient "vomited a lot", NBNB, then was found to be hypotensive and hypoxic to 86% on RA. Patient had been acting her usual self until then with no indication of distress. At baseline she sits in wheelchair, is nonverbal, and does not communicate in any way.ED course: T 101, , BP min 71/36, 94% on 4L. UA positive. Initial lactate 2.2. CT showed bilateral interstitial pulmonary thickening, which may reflect aspiration pneumonitis, and bladder wall thickening.  She received azithromycin, ceftriaxone and 2.5L LR. Lactate improved to 1.8 but BP did not, so she was started on levophed.  Family: (foster mother): Ana Watersar, [phone]  Consumer advisory board: Amy Rich, [phone]      PAST MEDICAL & SURGICAL HISTORY:  Down syndrome  Osteoporosis  Mild anemia  Neuropathy  S/P debridement  of R hip on 3/2/21    Assessment:  Patient received in bed, awake but confused. Primary rn present t bed side at time of assessment                     Skin assessed-   Patient frail and   contracted     Wound #1  Location:  R posterior big toe  Type: Full thickness ulceration   Size: 3x2x0.1  Tissue Description : Pink with maceration noted  on the outer border     Wound Exudate : None    Wound Edge:  Intact   Periwound Condition : Macerated      Other Etiology:  [ ] Aterial  [ ] Venous   [ ] Surgical Incision  [x ] Other:  R hip healed  full thickness wound  tunneled  with scare tissue and epithelium.                   L heel blanchable redness

## 2022-03-25 NOTE — DIETITIAN INITIAL EVALUATION ADULT. - PHYSCIAL ASSESSMENT
Cognition: alert, nonverbal  Skin: WOCN 3/25: Full thickness ulceration to R posterior big toe; R hip healed full thickness wound  tunneled with scar tissue and epithelium; L heel blanchable redness   GI: abdomen no distension noted; last BM 24-Mar-2022

## 2022-03-25 NOTE — DIETITIAN INITIAL EVALUATION ADULT. - PERTINENT MEDS FT
:  cefepime   IVPB 2000 milliGRAM(s) IV Intermittent every 8 hours  melatonin 5 milliGRAM(s) Oral at bedtime  multivitamin 1 Tablet(s) Oral daily  norepinephrine Infusion 0.05 MICROgram(s)/kG/Min (6.56 mL/Hr) IV Continuous <Continuous>  pantoprazole  Injectable 40 milliGRAM(s) IV Push daily  vancomycin  IVPB 1000 milliGRAM(s) IV Intermittent every 12 hours

## 2022-03-25 NOTE — SWALLOW BEDSIDE ASSESSMENT ADULT - ADDITIONAL RECOMMENDATIONS
SLP to f/u to determine safest and least restrictive diet. Consider MBS if overt s/s aspiration do not resolve at bedside.

## 2022-03-25 NOTE — PROGRESS NOTE ADULT - SUBJECTIVE AND OBJECTIVE BOX
TEA ECHAVARRIA  56y  Female  above events noted  in icu, sitting up, comfortable  bp stable on pressors  tolerates antibiotics  sputum sent for mrsa    Patient is a 56y old  Female who presents with a chief complaint of Aspiration pneumonia (25 Mar 2022 08:36)    HPI:  57y/o female with a pmhx of down syndrome, non-verbal, hypothyroidism, osteoporosis coming from [facility] here for eval multiple episodes of vomiting that began today. Pt was also found to be hypoxic and febrile. History was obtained from aid at bedside. This morning patient "vomited a lot", NBNB, then was found to be hypotensive and hypoxic to 86% on RA. Patient had been acting her usual self until then with no indication of distress. At baseline she sits in wheelchair, is nonverbal, and does not communicate in any way.    ED course: T 101, , BP min 71/36, 94% on 4L. UA positive. Initial lactate 2.2. CT showed bilateral interstitial pulmonary thickening, which may reflect aspiration pneumonitis, and bladder wall thickening.  She received azithromycin, ceftriaxone and 2.5L LR. Lactate improved to 1.8 but BP did not, so she was started on levophed.      Family: (foster mother): Ana Katz, [phone]  Consumer advisory board: Amy Rich, [phone]     (24 Mar 2022 20:31)      PAST MEDICAL & SURGICAL HISTORY:  Down syndrome    Osteoporosis    Mild anemia    Neuropathy    S/P debridement  of R hip on 3/2/21      FAMILY HISTORY:  No pertinent family history in first degree relatives      Social:    Home Medications:  ascorbic acid 500 mg oral tablet: 1 tab(s) orally once a day (29 Mar 2021 09:58)  collagenase 250 units/g topical ointment: 1 application topically 2 times a day (29 Mar 2021 09:58)  Ferrex-150 oral capsule: 1 cap(s) orally once a day (24 Mar 2022 20:45)  gabapentin 300 mg oral capsule: 1 cap(s) orally 2 times a day (24 Mar 2022 20:43)  melatonin 5 mg oral tablet: 1 tab(s) orally once a day (at bedtime) (19 Mar 2021 10:22)  midodrine 10 mg oral tablet: 1 tab(s) orally 3 times a day (29 Mar 2021 09:58)  Multiple Vitamins oral tablet: 1 tab(s) orally once a day (29 Mar 2021 09:58)  raloxifene 60 mg oral tablet: 1 tab(s) orally once a day (19 Mar 2021 10:22)  sodium hypochlorite 0.25% topical solution: 1 application topically 2 times a day (29 Mar 2021 09:58)  tiZANidine 2 mg oral tablet: 1 tab(s) orally 2 times a day (24 Mar 2022 20:42)  Vitamin D3 2000 intl units (50 mcg) oral capsule: 1 tab(s) orally once a day with meal (19 Mar 2021 10:22)      MEDICATIONS  (STANDING):  cefepime   IVPB 2000 milliGRAM(s) IV Intermittent every 8 hours  chlorhexidine 4% Liquid 1 Application(s) Topical <User Schedule>  collagenase Ointment 1 Application(s) Topical two times a day  enoxaparin Injectable 40 milliGRAM(s) SubCutaneous every 24 hours  gabapentin Solution 300 milliGRAM(s) Oral every 12 hours  melatonin 5 milliGRAM(s) Oral at bedtime  metroNIDAZOLE  IVPB 500 milliGRAM(s) IV Intermittent every 8 hours  midodrine. 10 milliGRAM(s) Oral three times a day  multivitamin 1 Tablet(s) Oral daily  norepinephrine Infusion 0.05 MICROgram(s)/kG/Min (6.56 mL/Hr) IV Continuous <Continuous>  pantoprazole  Injectable 40 milliGRAM(s) IV Push daily  raloxifene 60 milliGRAM(s) Oral daily  vancomycin  IVPB 1000 milliGRAM(s) IV Intermittent every 12 hours    MEDICATIONS  (PRN):      No Known Allergies      INTERVAL HPI/OVERNIGHT EVENTS:        REVIEW OF SYSTEMS:  CONSTITUTIONAL: No fever, weight loss, or fatigue  EYES: No eye pain, visual disturbances, or discharge  ENMT:  No difficulty hearing, tinnitus, vertigo; No sinus or throat pain  NECK: No pain or stiffness  BREASTS: No pain, masses, or nipple discharge  RESPIRATORY: No cough, wheezing, chills or hemoptysis; No shortness of breath  CARDIOVASCULAR: No chest pain, palpitations, dizziness, or leg swelling  GASTROINTESTINAL: No abdominal or epigastric pain. No nausea, vomiting, or hematemesis; No diarrhea or constipation. No melena or hematochezia.  GENITOURINARY: No dysuria, frequency, hematuria, or incontinence  NEUROLOGICAL: No headaches, memory loss, loss of strength, numbness, or tremors  SKIN: No itching, burning, rashes, or lesions   LYMPH NODES: No enlarged glands  ENDOCRINE: No heat or cold intolerance; No hair loss  MUSCULOSKELETAL: No joint pain or swelling; No muscle, back, or extremity pain  PSYCHIATRIC: No depression, anxiety, mood swings, or difficulty sleeping  HEME/LYMPH: No easy bruising, or bleeding gums  ALLERY AND IMMUNOLOGIC: No hives or eczema    T(C): 37.9 (22 @ 04:00), Max: 38.3 (22 @ 12:06)  HR: 80 (22 @ 07:00) (74 - 115)  BP: 96/68 (22 @ 07:00) ( - 164)  RR: 18 (22 @ 07:00) (6 - 63)  SpO2: 96% (22 @ 07:00) (88% - 100%)  Wt(kg): --Vital Signs Last 24 Hrs  T(C): 37.9 (25 Mar 2022 04:00), Max: 38.3 (24 Mar 2022 12:06)  T(F): 100.2 (25 Mar 2022 04:00), Max: 101 (24 Mar 2022 12:06)  HR: 80 (25 Mar 2022 07:00) (74 - 115)  BP: 96/68 (25 Mar 2022 07:00) (7136 - 164/)  BP(mean): 84 (25 Mar 2022 07:00) (55 - 112)  RR: 18 (25 Mar 2022 07:00) (6 - 63)  SpO2: 96% (25 Mar 2022 07:00) (88% - 100%)    PHYSICAL EXAM:  GENERAL: NAD, well-groomed, well-developed  HEAD:  Atraumatic, Normocephalic  EYES: EOMI, PERRLA, conjunctiva and sclera clear  ENMT: No tonsillar erythema, exudates, or enlargement; Moist mucous membranes, Good dentition, No lesions  NECK: Supple, No JVD, Normal thyroid  NERVOUS SYSTEM:  Alert & Oriented X3, Good concentration; Motor Strength 5/5 B/L upper and lower extremities; DTRs 2+ intact and symmetric  CHEST/LUNG: scattered creps  HEART: Regular rate and rhythm; No murmurs, rubs, or gallops  ABDOMEN: Soft, Nontender, Nondistended; Bowel sounds present  EXTREMITIES:  2+ Peripheral Pulses, No clubbing, cyanosis, or edema  LYMPH: No lymphadenopathy noted  SKIN: decubital ulcer- needs local care    Consultant(s) Notes Reviewed:  [x ] YES  [ ] NO  Care Discussed with Consultants/Other Providers [ x] YES  [ ] NO    LABS:                        12.5   20.10 )-----------( 195      ( 25 Mar 2022 04:20 )             38.2         141  |  104  |  10  ----------------------------<  115<H>  3.5   |  26  |  0.7    Ca    8.3<L>      25 Mar 2022 04:20    TPro  5.7<L>  /  Alb  3.2<L>  /  TBili  0.5  /  DBili  x   /  AST  22  /  ALT  14  /  AlkPhos  106        Urinalysis Basic - ( 24 Mar 2022 12:36 )    Color: Light Yellow / Appearance: Clear / S.014 / pH: x  Gluc: x / Ketone: Negative  / Bili: Negative / Urobili: <2 mg/dL   Blood: x / Protein: Negative / Nitrite: Negative   Leuk Esterase: Large / RBC: 2 /HPF /  /HPF   Sq Epi: x / Non Sq Epi: 0 /HPF / Bacteria: Negative      RADIOLOGY & ADDITIONAL TESTS:    Imaging Personally Reviewed:  [ ] YES  [ ] NO    HEALTH ISSUES - PROBLEM Dx:  uti/cystitis, aspiration pneumonitis  sepsis- improving  wean off pressors   cont antibiotics  add k  will follow       TEA ECHAVARRIA  56y  Female  above events noted  in icu, sitting up, comfortable  bp stable on pressors  tolerates antibiotics  sputum sent for mrsa    Patient is a 56y old  Female who presents with a chief complaint of Aspiration pneumonia (25 Mar 2022 08:36)    HPI:  55y/o female with a pmhx of down syndrome, non-verbal, hypothyroidism, osteoporosis coming from [facility] here for eval multiple episodes of vomiting that began today. Pt was also found to be hypoxic and febrile. History was obtained from aid at bedside. This morning patient "vomited a lot", NBNB, then was found to be hypotensive and hypoxic to 86% on RA. Patient had been acting her usual self until then with no indication of distress. At baseline she sits in wheelchair, is nonverbal, and does not communicate in any way.    ED course: T 101, , BP min 71/36, 94% on 4L. UA positive. Initial lactate 2.2. CT showed bilateral interstitial pulmonary thickening, which may reflect aspiration pneumonitis, and bladder wall thickening.  She received azithromycin, ceftriaxone and 2.5L LR. Lactate improved to 1.8 but BP did not, so she was started on levophed.      Family: (foster mother): Ana Katz, [phone]  Consumer advisory board: Amy Rich, [phone]     (24 Mar 2022 20:31)      PAST MEDICAL & SURGICAL HISTORY:  Down syndrome    Osteoporosis    Mild anemia    Neuropathy    S/P debridement  of R hip on 3/2/21      FAMILY HISTORY:  No pertinent family history in first degree relatives      Social:    Home Medications:  ascorbic acid 500 mg oral tablet: 1 tab(s) orally once a day (29 Mar 2021 09:58)  collagenase 250 units/g topical ointment: 1 application topically 2 times a day (29 Mar 2021 09:58)  Ferrex-150 oral capsule: 1 cap(s) orally once a day (24 Mar 2022 20:45)  gabapentin 300 mg oral capsule: 1 cap(s) orally 2 times a day (24 Mar 2022 20:43)  melatonin 5 mg oral tablet: 1 tab(s) orally once a day (at bedtime) (19 Mar 2021 10:22)  midodrine 10 mg oral tablet: 1 tab(s) orally 3 times a day (29 Mar 2021 09:58)  Multiple Vitamins oral tablet: 1 tab(s) orally once a day (29 Mar 2021 09:58)  raloxifene 60 mg oral tablet: 1 tab(s) orally once a day (19 Mar 2021 10:22)  sodium hypochlorite 0.25% topical solution: 1 application topically 2 times a day (29 Mar 2021 09:58)  tiZANidine 2 mg oral tablet: 1 tab(s) orally 2 times a day (24 Mar 2022 20:42)  Vitamin D3 2000 intl units (50 mcg) oral capsule: 1 tab(s) orally once a day with meal (19 Mar 2021 10:22)      MEDICATIONS  (STANDING):  cefepime   IVPB 2000 milliGRAM(s) IV Intermittent every 8 hours  chlorhexidine 4% Liquid 1 Application(s) Topical <User Schedule>  collagenase Ointment 1 Application(s) Topical two times a day  enoxaparin Injectable 40 milliGRAM(s) SubCutaneous every 24 hours  gabapentin Solution 300 milliGRAM(s) Oral every 12 hours  melatonin 5 milliGRAM(s) Oral at bedtime  metroNIDAZOLE  IVPB 500 milliGRAM(s) IV Intermittent every 8 hours  midodrine. 10 milliGRAM(s) Oral three times a day  multivitamin 1 Tablet(s) Oral daily  norepinephrine Infusion 0.05 MICROgram(s)/kG/Min (6.56 mL/Hr) IV Continuous <Continuous>  pantoprazole  Injectable 40 milliGRAM(s) IV Push daily  raloxifene 60 milliGRAM(s) Oral daily  vancomycin  IVPB 1000 milliGRAM(s) IV Intermittent every 12 hours    MEDICATIONS  (PRN):      No Known Allergies      INTERVAL HPI/OVERNIGHT EVENTS:        REVIEW OF SYSTEMS:  CONSTITUTIONAL: No fever, weight loss, or fatigue  EYES: No eye pain, visual disturbances, or discharge  ENMT:  No difficulty hearing, tinnitus, vertigo; No sinus or throat pain  NECK: No pain or stiffness  BREASTS: No pain, masses, or nipple discharge  RESPIRATORY: No cough, wheezing, chills or hemoptysis; No shortness of breath  CARDIOVASCULAR: No chest pain, palpitations, dizziness, or leg swelling  GASTROINTESTINAL: No abdominal or epigastric pain. No nausea, vomiting, or hematemesis; No diarrhea or constipation. No melena or hematochezia.  GENITOURINARY: No dysuria, frequency, hematuria, or incontinence  NEUROLOGICAL: No headaches, memory loss, loss of strength, numbness, or tremors  SKIN: No itching, burning, rashes, or lesions   LYMPH NODES: No enlarged glands  ENDOCRINE: No heat or cold intolerance; No hair loss  MUSCULOSKELETAL: No joint pain or swelling; No muscle, back, or extremity pain  PSYCHIATRIC: No depression, anxiety, mood swings, or difficulty sleeping  HEME/LYMPH: No easy bruising, or bleeding gums  ALLERY AND IMMUNOLOGIC: No hives or eczema    T(C): 37.9 (22 @ 04:00), Max: 38.3 (22 @ 12:06)  HR: 80 (22 @ 07:00) (74 - 115)  BP: 96/68 (22 @ 07:00) ( - 164)  RR: 18 (22 @ 07:00) (6 - 63)  SpO2: 96% (22 @ 07:00) (88% - 100%)  Wt(kg): --Vital Signs Last 24 Hrs  T(C): 37.9 (25 Mar 2022 04:00), Max: 38.3 (24 Mar 2022 12:06)  T(F): 100.2 (25 Mar 2022 04:00), Max: 101 (24 Mar 2022 12:06)  HR: 80 (25 Mar 2022 07:00) (74 - 115)  BP: 96/68 (25 Mar 2022 07:00) (7136 - 164/)  BP(mean): 84 (25 Mar 2022 07:00) (55 - 112)  RR: 18 (25 Mar 2022 07:00) (6 - 63)  SpO2: 96% (25 Mar 2022 07:00) (88% - 100%)    PHYSICAL EXAM:  GENERAL: NAD, well-groomed, well-developed  HEAD:  Atraumatic, Normocephalic  EYES: EOMI, PERRLA, conjunctiva and sclera clear  ENMT: No tonsillar erythema, exudates, or enlargement; Moist mucous membranes, Good dentition, No lesions  NECK: Supple, No JVD, Normal thyroid  NERVOUS SYSTEM:  Alert & Oriented X3, Good concentration; Motor Strength 5/5 B/L upper and lower extremities; DTRs 2+ intact and symmetric  CHEST/LUNG: scattered creps  HEART: Regular rate and rhythm loud harsh systolic murmur, rubs, or gallops  ABDOMEN: Soft, Nontender, Nondistended; Bowel sounds present  EXTREMITIES:  2+ Peripheral Pulses, No clubbing, cyanosis, or edema  LYMPH: No lymphadenopathy noted  SKIN: decubital ulcer- needs local care    Consultant(s) Notes Reviewed:  [x ] YES  [ ] NO  Care Discussed with Consultants/Other Providers [ x] YES  [ ] NO    LABS:                        12.5   20.10 )-----------( 195      ( 25 Mar 2022 04:20 )             38.2         141  |  104  |  10  ----------------------------<  115<H>  3.5   |  26  |  0.7    Ca    8.3<L>      25 Mar 2022 04:20    TPro  5.7<L>  /  Alb  3.2<L>  /  TBili  0.5  /  DBili  x   /  AST  22  /  ALT  14  /  AlkPhos  106        Urinalysis Basic - ( 24 Mar 2022 12:36 )    Color: Light Yellow / Appearance: Clear / S.014 / pH: x  Gluc: x / Ketone: Negative  / Bili: Negative / Urobili: <2 mg/dL   Blood: x / Protein: Negative / Nitrite: Negative   Leuk Esterase: Large / RBC: 2 /HPF /  /HPF   Sq Epi: x / Non Sq Epi: 0 /HPF / Bacteria: Negative      RADIOLOGY & ADDITIONAL TESTS:    Imaging Personally Reviewed:  [ ] YES  [ ] NO    HEALTH ISSUES - PROBLEM Dx:  uti/cystitis, aspiration pneumonitis  sepsis- improving  wean off pressors   cont antibiotics  add k  will follow

## 2022-03-25 NOTE — DIETITIAN INITIAL EVALUATION ADULT. - PERTINENT LABORATORY DATA
:                        12.5   20.10 )-----------( 195      ( 25 Mar 2022 04:20 )             38.2     03-25    141  |  104  |  10  ----------------------------<  115<H>  3.5   |  26  |  0.7    Ca    8.3<L>      25 Mar 2022 04:20    TPro  5.7<L>  /  Alb  3.2<L>  /  TBili  0.5  /  DBili  x   /  AST  22  /  ALT  14  /  AlkPhos  106  03-25

## 2022-03-25 NOTE — SWALLOW BEDSIDE ASSESSMENT ADULT - COMMENTS
ED course: T 101, , BP min 71/36, 94% on 4L. UA positive. Initial lactate 2.2. CT showed bilateral interstitial pulmonary thickening, which may reflect aspiration pneumonitis, and bladder wall thickening.    Pt is known to SLP services and was seen for bedside assessment 3/2021. Found to have mild - mod oral dysphagia for puree and nectar thick liquids, multiple swallows noted; no overt s/s penetration/aspiration. Recommended puree diet and mildly think liquids.

## 2022-03-25 NOTE — ADVANCED PRACTICE NURSE CONSULT - RECOMMEDATIONS
Plan: Clean R big toe wound with saline, pat dry then apply triad with Allevyn foam dressing   Pressure  injury  preventive  measures  skin care   Asses wound and inform primary provider of any changes   Case discussed with primary Rn/ Md   Wound/ ostomy specialist  to f/u as needed     Offloading: [x ] Frequent position changes [ ] Devices/Equipment  Cleansing: [x ] Saline [ ] Soap/Water [ ] Other: ______  Topicals: [ x] Barrier Cream [ ] Antimicrobial [ ] Enzymatic Wound Debridement  Dressings: [ ] Dry, sterile [ ] Foam [ ] Absorbant Pads [ ] Collagenase

## 2022-03-26 LAB
ALBUMIN SERPL ELPH-MCNC: 3.2 G/DL — LOW (ref 3.5–5.2)
ALP SERPL-CCNC: 113 U/L — SIGNIFICANT CHANGE UP (ref 30–115)
ALT FLD-CCNC: 12 U/L — SIGNIFICANT CHANGE UP (ref 0–41)
ANION GAP SERPL CALC-SCNC: 12 MMOL/L — SIGNIFICANT CHANGE UP (ref 7–14)
AST SERPL-CCNC: 17 U/L — SIGNIFICANT CHANGE UP (ref 0–41)
BASOPHILS # BLD AUTO: 0.04 K/UL — SIGNIFICANT CHANGE UP (ref 0–0.2)
BASOPHILS NFR BLD AUTO: 0.3 % — SIGNIFICANT CHANGE UP (ref 0–1)
BILIRUB SERPL-MCNC: 0.4 MG/DL — SIGNIFICANT CHANGE UP (ref 0.2–1.2)
BUN SERPL-MCNC: 8 MG/DL — LOW (ref 10–20)
CALCIUM SERPL-MCNC: 8.3 MG/DL — LOW (ref 8.5–10.1)
CHLORIDE SERPL-SCNC: 101 MMOL/L — SIGNIFICANT CHANGE UP (ref 98–110)
CO2 SERPL-SCNC: 26 MMOL/L — SIGNIFICANT CHANGE UP (ref 17–32)
CREAT SERPL-MCNC: 0.7 MG/DL — SIGNIFICANT CHANGE UP (ref 0.7–1.5)
CULTURE RESULTS: SIGNIFICANT CHANGE UP
EGFR: 102 ML/MIN/1.73M2 — SIGNIFICANT CHANGE UP
EOSINOPHIL # BLD AUTO: 0.23 K/UL — SIGNIFICANT CHANGE UP (ref 0–0.7)
EOSINOPHIL NFR BLD AUTO: 1.6 % — SIGNIFICANT CHANGE UP (ref 0–8)
GLUCOSE SERPL-MCNC: 124 MG/DL — HIGH (ref 70–99)
HCT VFR BLD CALC: 38.1 % — SIGNIFICANT CHANGE UP (ref 37–47)
HGB BLD-MCNC: 13 G/DL — SIGNIFICANT CHANGE UP (ref 12–16)
IMM GRANULOCYTES NFR BLD AUTO: 0.3 % — SIGNIFICANT CHANGE UP (ref 0.1–0.3)
LYMPHOCYTES # BLD AUTO: 1.24 K/UL — SIGNIFICANT CHANGE UP (ref 1.2–3.4)
LYMPHOCYTES # BLD AUTO: 8.6 % — LOW (ref 20.5–51.1)
MAGNESIUM SERPL-MCNC: 1.8 MG/DL — SIGNIFICANT CHANGE UP (ref 1.8–2.4)
MCHC RBC-ENTMCNC: 33.1 PG — HIGH (ref 27–31)
MCHC RBC-ENTMCNC: 34.1 G/DL — SIGNIFICANT CHANGE UP (ref 32–37)
MCV RBC AUTO: 96.9 FL — SIGNIFICANT CHANGE UP (ref 81–99)
MONOCYTES # BLD AUTO: 0.55 K/UL — SIGNIFICANT CHANGE UP (ref 0.1–0.6)
MONOCYTES NFR BLD AUTO: 3.8 % — SIGNIFICANT CHANGE UP (ref 1.7–9.3)
NEUTROPHILS # BLD AUTO: 12.29 K/UL — HIGH (ref 1.4–6.5)
NEUTROPHILS NFR BLD AUTO: 85.4 % — HIGH (ref 42.2–75.2)
NRBC # BLD: 0 /100 WBCS — SIGNIFICANT CHANGE UP (ref 0–0)
PLATELET # BLD AUTO: 201 K/UL — SIGNIFICANT CHANGE UP (ref 130–400)
POTASSIUM SERPL-MCNC: 3.4 MMOL/L — LOW (ref 3.5–5)
POTASSIUM SERPL-SCNC: 3.4 MMOL/L — LOW (ref 3.5–5)
PROT SERPL-MCNC: 5.9 G/DL — LOW (ref 6–8)
RBC # BLD: 3.93 M/UL — LOW (ref 4.2–5.4)
RBC # FLD: 14.2 % — SIGNIFICANT CHANGE UP (ref 11.5–14.5)
S PNEUM AG UR QL: NEGATIVE — SIGNIFICANT CHANGE UP
SODIUM SERPL-SCNC: 139 MMOL/L — SIGNIFICANT CHANGE UP (ref 135–146)
SPECIMEN SOURCE: SIGNIFICANT CHANGE UP
T4 AB SER-ACNC: 8.5 UG/DL — SIGNIFICANT CHANGE UP (ref 4.6–12)
TSH SERPL-MCNC: 4.7 UIU/ML — HIGH (ref 0.27–4.2)
VANCOMYCIN TROUGH SERPL-MCNC: 15.4 UG/ML — HIGH (ref 5–10)
WBC # BLD: 14.4 K/UL — HIGH (ref 4.8–10.8)
WBC # FLD AUTO: 14.4 K/UL — HIGH (ref 4.8–10.8)

## 2022-03-26 PROCEDURE — 99291 CRITICAL CARE FIRST HOUR: CPT

## 2022-03-26 PROCEDURE — 71045 X-RAY EXAM CHEST 1 VIEW: CPT | Mod: 26

## 2022-03-26 RX ORDER — MUPIROCIN 20 MG/G
1 OINTMENT TOPICAL
Refills: 0 | Status: DISCONTINUED | OUTPATIENT
Start: 2022-03-26 | End: 2022-04-01

## 2022-03-26 RX ORDER — POTASSIUM CHLORIDE 20 MEQ
20 PACKET (EA) ORAL ONCE
Refills: 0 | Status: COMPLETED | OUTPATIENT
Start: 2022-03-26 | End: 2022-03-26

## 2022-03-26 RX ORDER — SODIUM CHLORIDE 9 MG/ML
500 INJECTION INTRAMUSCULAR; INTRAVENOUS; SUBCUTANEOUS ONCE
Refills: 0 | Status: COMPLETED | OUTPATIENT
Start: 2022-03-26 | End: 2022-03-26

## 2022-03-26 RX ORDER — SODIUM CHLORIDE 9 MG/ML
1000 INJECTION INTRAMUSCULAR; INTRAVENOUS; SUBCUTANEOUS
Refills: 0 | Status: DISCONTINUED | OUTPATIENT
Start: 2022-03-26 | End: 2022-03-27

## 2022-03-26 RX ORDER — PANTOPRAZOLE SODIUM 20 MG/1
40 TABLET, DELAYED RELEASE ORAL DAILY
Refills: 0 | Status: DISCONTINUED | OUTPATIENT
Start: 2022-03-26 | End: 2022-04-01

## 2022-03-26 RX ORDER — HYDROCORTISONE 20 MG
50 TABLET ORAL EVERY 8 HOURS
Refills: 0 | Status: DISCONTINUED | OUTPATIENT
Start: 2022-03-26 | End: 2022-04-01

## 2022-03-26 RX ADMIN — MUPIROCIN 1 APPLICATION(S): 20 OINTMENT TOPICAL at 06:07

## 2022-03-26 RX ADMIN — MIDODRINE HYDROCHLORIDE 10 MILLIGRAM(S): 2.5 TABLET ORAL at 17:06

## 2022-03-26 RX ADMIN — RALOXIFENE HYDROCHLORIDE 60 MILLIGRAM(S): 60 TABLET, COATED ORAL at 12:28

## 2022-03-26 RX ADMIN — GABAPENTIN 300 MILLIGRAM(S): 400 CAPSULE ORAL at 17:06

## 2022-03-26 RX ADMIN — Medication 50 MILLIEQUIVALENT(S): at 10:04

## 2022-03-26 RX ADMIN — MUPIROCIN 1 APPLICATION(S): 20 OINTMENT TOPICAL at 17:07

## 2022-03-26 RX ADMIN — Medication 1 APPLICATION(S): at 17:07

## 2022-03-26 RX ADMIN — Medication 250 MILLIGRAM(S): at 07:46

## 2022-03-26 RX ADMIN — Medication 1 TABLET(S): at 12:28

## 2022-03-26 RX ADMIN — CEFEPIME 100 MILLIGRAM(S): 1 INJECTION, POWDER, FOR SOLUTION INTRAMUSCULAR; INTRAVENOUS at 07:45

## 2022-03-26 RX ADMIN — Medication 100 MILLIGRAM(S): at 08:16

## 2022-03-26 RX ADMIN — Medication 100 MILLIGRAM(S): at 13:10

## 2022-03-26 RX ADMIN — ENOXAPARIN SODIUM 40 MILLIGRAM(S): 100 INJECTION SUBCUTANEOUS at 00:00

## 2022-03-26 RX ADMIN — Medication 1 APPLICATION(S): at 06:07

## 2022-03-26 RX ADMIN — GABAPENTIN 300 MILLIGRAM(S): 400 CAPSULE ORAL at 07:45

## 2022-03-26 RX ADMIN — CEFEPIME 100 MILLIGRAM(S): 1 INJECTION, POWDER, FOR SOLUTION INTRAMUSCULAR; INTRAVENOUS at 21:04

## 2022-03-26 RX ADMIN — Medication 250 MILLIGRAM(S): at 17:05

## 2022-03-26 RX ADMIN — SODIUM CHLORIDE 500 MILLILITER(S): 9 INJECTION INTRAMUSCULAR; INTRAVENOUS; SUBCUTANEOUS at 09:31

## 2022-03-26 RX ADMIN — CHLORHEXIDINE GLUCONATE 1 APPLICATION(S): 213 SOLUTION TOPICAL at 06:07

## 2022-03-26 RX ADMIN — CEFEPIME 100 MILLIGRAM(S): 1 INJECTION, POWDER, FOR SOLUTION INTRAMUSCULAR; INTRAVENOUS at 14:42

## 2022-03-26 RX ADMIN — MIDODRINE HYDROCHLORIDE 10 MILLIGRAM(S): 2.5 TABLET ORAL at 07:45

## 2022-03-26 RX ADMIN — SODIUM CHLORIDE 125 MILLILITER(S): 9 INJECTION INTRAMUSCULAR; INTRAVENOUS; SUBCUTANEOUS at 09:32

## 2022-03-26 RX ADMIN — Medication 50 MILLIGRAM(S): at 14:00

## 2022-03-26 RX ADMIN — Medication 5 MILLIGRAM(S): at 21:05

## 2022-03-26 RX ADMIN — ENOXAPARIN SODIUM 40 MILLIGRAM(S): 100 INJECTION SUBCUTANEOUS at 23:08

## 2022-03-26 RX ADMIN — PANTOPRAZOLE SODIUM 40 MILLIGRAM(S): 20 TABLET, DELAYED RELEASE ORAL at 12:28

## 2022-03-26 RX ADMIN — MIDODRINE HYDROCHLORIDE 10 MILLIGRAM(S): 2.5 TABLET ORAL at 12:28

## 2022-03-26 RX ADMIN — Medication 50 MILLIGRAM(S): at 21:05

## 2022-03-26 RX ADMIN — Medication 100 MILLIGRAM(S): at 21:04

## 2022-03-26 NOTE — PROGRESS NOTE ADULT - ASSESSMENT
ASSESSMENT & PLAN    57y/o female with a pmhx of down syndrome, non-verbal, hypothyroidism, osteoporosis presents after multiple episodes of vomiting admitted for septic shock.    #Septic shock on pressors POA  #Acute hypoxic respiratory failure  #Aspiration pneumonia/pneumonitis  #Acute cystitis  - Continue Vancomycin, Cefepime, Flagyl  - MRSA positive  - F/u BCx and Urinary Cx. urine negative, no growth to date in blood  - F/u procal, sputum culture. Strep/Legionella Ag both negative  - Taper Pressors as tolerated, patient on midodrine 10mg TID at home  - patient on pureed diet with no liquids    #Systolic Murmur 2/2 possible AS  - TTE EF 65%    #Osteoporosis  -Continue Raloxifene    #Neuropathy  - Continue Gabapentin    #Downs Syndrome  - Non verbal, Wheelchair bound    # ? history of hypothyroidism  - not on synthroid at home, F/U TSH, T4                                                                              ----------------------------------------------------  # DVT prophylaxis: On lovenox  # GI prophylaxis: Protonix   # Diet: pureed, no liquids  # Code status: Full code   # Disposition: MICU, likely downgrade tomorrow

## 2022-03-26 NOTE — PROGRESS NOTE ADULT - SUBJECTIVE AND OBJECTIVE BOX
TEA ECHAVARRIA 56y Female  MRN#: [id number]   CODE STATUS: Full code    Hospital Day: 2d    Pt is currently admitted with the primary diagnosis of septic shock 2/2 possible aspiration PNA    SUBJECTIVE  Hospital Course:   57y/o female with a pmhx of down syndrome, non-verbal, hypothyroidism, osteoporosis coming from [facility] here for eval multiple episodes of vomiting that began today. Pt was also found to be hypoxic and febrile. History was obtained from aid at bedside. This morning patient "vomited a lot", NBNB, then was found to be hypotensive and hypoxic to 86% on RA. Patient had been acting her usual self until then with no indication of distress. At baseline she sits in wheelchair, is nonverbal, and does not communicate in any way.    ED course: T 101, , BP min 71/36, 94% on 4L. UA positive. Initial lactate 2.2. CT showed bilateral interstitial pulmonary thickening, which may reflect aspiration pneumonitis, and bladder wall thickening.  She received azithromycin, ceftriaxone and 2.5L LR. Lactate improved to 1.8 but BP did not, so she was started on levophed.    3/26: Patient continues to require levophed, will give fluids today as she is positive 2 L since admission, possible increase dose of midodrine if unable to wean off pressors, patient has low blood pressure at baseline per Dr. Serrano    Overnight events: No overnight events   ----------------------------------------------------------  OBJECTIVE  PAST MEDICAL & SURGICAL HISTORY  Down syndrome    Osteoporosis    Mild anemia    Neuropathy    S/P debridement  of R hip on 3/2/21                                              -----------------------------------------------------------  ALLERGIES:  No Known Allergies                                            ------------------------------------------------------------    HOME MEDICATIONS  Home Medications:  ascorbic acid 500 mg oral tablet: 1 tab(s) orally once a day (29 Mar 2021 09:58)  collagenase 250 units/g topical ointment: 1 application topically 2 times a day (29 Mar 2021 09:58)  Ferrex-150 oral capsule: 1 cap(s) orally once a day (24 Mar 2022 20:45)  gabapentin 300 mg oral capsule: 1 cap(s) orally 2 times a day (24 Mar 2022 20:43)  melatonin 5 mg oral tablet: 1 tab(s) orally once a day (at bedtime) (19 Mar 2021 10:22)  midodrine 10 mg oral tablet: 1 tab(s) orally 3 times a day (29 Mar 2021 09:58)  Multiple Vitamins oral tablet: 1 tab(s) orally once a day (29 Mar 2021 09:58)  raloxifene 60 mg oral tablet: 1 tab(s) orally once a day (19 Mar 2021 10:22)  sodium hypochlorite 0.25% topical solution: 1 application topically 2 times a day (29 Mar 2021 09:58)  tiZANidine 2 mg oral tablet: 1 tab(s) orally 2 times a day (24 Mar 2022 20:42)  Vitamin D3 2000 intl units (50 mcg) oral capsule: 1 tab(s) orally once a day with meal (19 Mar 2021 10:22)                           MEDICATIONS:  STANDING MEDICATIONS  cefepime   IVPB 2000 milliGRAM(s) IV Intermittent every 8 hours  chlorhexidine 4% Liquid 1 Application(s) Topical <User Schedule>  collagenase Ointment 1 Application(s) Topical two times a day  enoxaparin Injectable 40 milliGRAM(s) SubCutaneous every 24 hours  gabapentin Solution 300 milliGRAM(s) Oral every 12 hours  melatonin 5 milliGRAM(s) Oral at bedtime  metroNIDAZOLE  IVPB 500 milliGRAM(s) IV Intermittent every 8 hours  midodrine. 10 milliGRAM(s) Oral three times a day  multivitamin 1 Tablet(s) Oral daily  norepinephrine Infusion 0.05 MICROgram(s)/kG/Min IV Continuous <Continuous>  pantoprazole  Injectable 40 milliGRAM(s) IV Push daily  raloxifene 60 milliGRAM(s) Oral daily  vancomycin  IVPB 1000 milliGRAM(s) IV Intermittent every 12 hours    PRN MEDICATIONS                                            ------------------------------------------------------------  VITAL SIGNS: Last 24 Hours  ICU Vital Signs Last 24 Hrs  T(C): 37.2 (26 Mar 2022 08:00), Max: 37.7 (25 Mar 2022 16:00)  T(F): 99 (26 Mar 2022 08:00), Max: 99.9 (25 Mar 2022 16:00)  HR: 74 (26 Mar 2022 10:30) (54 - 112)  BP: 90/51 (26 Mar 2022 10:15) (75/49 - 127/80)  BP(mean): 64 (26 Mar 2022 10:15) (52 - 102)  ABP: --  ABP(mean): --  RR: 12 (26 Mar 2022 10:30) (11 - 31)  SpO2: 96% (26 Mar 2022 10:30) (92% - 99%)                                             --------------------------------------------------------------  LABS:                            13.0   14.40 )-----------( 201      ( 26 Mar 2022 06:15 )             38.1     03-26    139  |  101  |  8<L>  ----------------------------<  124<H>  3.4<L>   |  26  |  0.7    Ca    8.3<L>      26 Mar 2022 06:15  Mg     1.8     03-26    TPro  5.9<L>  /  Alb  3.2<L>  /  TBili  0.4  /  DBili  x   /  AST  17  /  ALT  12  /  AlkPhos  113  03-26    PT/INR - ( 24 Mar 2022 12:36 )   PT: 12.60 sec;   INR: 1.10 ratio         PTT - ( 24 Mar 2022 12:36 )  PTT:22.3 sec                                                -------------------------------------------------------------  RADIOLOGY:  CT Abd/Pelvis w/ IV contrast (03/24):   Bilateral interstitial pulmonary thickening, right greater than right.   They may reflect chronic aspiration pneumonitis in the appropriate   clinical context.  There appears to be bladder wall thickening. Rule out cystitis  Degenerative changes of the spine.  Hepatic steatosis.                                            --------------------------------------------------------------    PHYSICAL EXAM:  GENERAL: NAD, lying in bed comfortably  HEAD: Atraumatic, Normocephalic  EYES: EOMI, PERRLA, conjunctiva and sclera clear  ENT: Dry mucous membranes  NECK: Supple, No JVD  CHEST/LUNG: Clear to auscultation bilaterally; No rales, rhonchi, wheezing, or rubs. Unlabored respirations  HEART: Regular rate and rhythm; Systolic murmur heard best over aortic area  ABDOMEN: Bowel sounds present; Nontender  EXTREMITIES: Sluggish capillary refill. No clubbing, cyanosis, or edema  NERVOUS SYSTEM: Nonverbal, does not follow commands                                           --------------------------------------------------------------    ASSESSMENT & PLAN    57y/o female with a pmhx of down syndrome, non-verbal, hypothyroidism, osteoporosis presents after multiple episodes of vomiting admitted for septic shock.    #Septic shock on pressors POA  #Acute hypoxic respiratory failure  #Aspiration pneumonia/pneumonitis  #Acute cystitis  - Continue Vancomycin, Cefepime, Flagyl  - MRSA positive  - F/u BCx and Urinary Cx. urine negative, no growth to date in blood  - F/u procal, sputum culture. Strep/Legionella Ag both negative  - Taper Pressors as tolerated, patient on midodrine 10mg TID at home  - patient on pureed diet with no liquids    #Systolic Murmur 2/2 possible AS  - TTE EF 65%    #Osteoporosis  -Continue Raloxifene    #Neuropathy  - Continue Gabapentin    #Downs Syndrome  - Non verbal, Wheelchair bound    # ? history of hypothyroidism  - not on synthroid at home, F/U TSH, T4                                                                              ----------------------------------------------------  # DVT prophylaxis: On lovenox  # GI prophylaxis: Protonix   # Diet: pureed, no liquids  # Code status: Full code   # Disposition: MICU, likely downgrade tomorrow

## 2022-03-26 NOTE — PROGRESS NOTE ADULT - SUBJECTIVE AND OBJECTIVE BOX
CTU Attending Progress Daily Note     26 Mar 2022 08:35  POD# -   He has history of Down syndrome    Osteoporosis    Mild anemia    Neuropathy      Interval event for past 24 hr:  TEA ECHAVARRIA  56y had no event.   Current Complains:  TEA ECHAVARRIA has no new complains  HPI:  57y/o female with a pmhx of down syndrome, non-verbal, hypothyroidism, osteoporosis coming from [facility] here for eval multiple episodes of vomiting that began today. Pt was also found to be hypoxic and febrile. History was obtained from aid at bedside. This morning patient "vomited a lot", NBNB, then was found to be hypotensive and hypoxic to 86% on RA. Patient had been acting her usual self until then with no indication of distress. At baseline she sits in wheelchair, is nonverbal, and does not communicate in any way.    ED course: T 101, , BP min 71/36, 94% on 4L. UA positive. Initial lactate 2.2. CT showed bilateral interstitial pulmonary thickening, which may reflect aspiration pneumonitis, and bladder wall thickening.  She received azithromycin, ceftriaxone and 2.5L LR. Lactate improved to 1.8 but BP did not, so she was started on levophed.      Family: (foster mother): Ana Katz, [phone]  Consumer advisory board: Amy Rich, [phone]     (24 Mar 2022 20:31)    OBJECTIVE:  ICU Vital Signs Last 24 Hrs  T(C): 37.2 (26 Mar 2022 08:00), Max: 37.7 (25 Mar 2022 16:00)  T(F): 99 (26 Mar 2022 08:00), Max: 99.9 (25 Mar 2022 16:00)  HR: 66 (26 Mar 2022 08:00) (64 - 112)  BP: 116/67 (26 Mar 2022 08:00) (75/49 - 127/80)  BP(mean): 94 (26 Mar 2022 08:00) (56 - 102)  ABP: --  ABP(mean): --  RR: 17 (26 Mar 2022 08:00) (11 - 31)  SpO2: 97% (26 Mar 2022 08:25) (92% - 99%)    I&O's Summary    25 Mar 2022 07:01  -  26 Mar 2022 07:00  --------------------------------------------------------  IN: 1131 mL / OUT: 2950 mL / NET: -1819 mL    26 Mar 2022 07:01  -  26 Mar 2022 08:35  --------------------------------------------------------  IN: 103.5 mL / OUT: 175 mL / NET: -71.5 mL      I&O's Detail    25 Mar 2022 07:  -  26 Mar 2022 07:00  --------------------------------------------------------  IN:    IV PiggyBack: 550 mL    Lactated Ringers Bolus: 500 mL    Norepinephrine: 81 mL  Total IN: 1131 mL    OUT:    Indwelling Catheter - Urethral (mL): 2950 mL  Total OUT: 2950 mL    Total NET: -1819 mL      26 Mar 2022 07:  -  26 Mar 2022 08:35  --------------------------------------------------------  IN:    IV PiggyBack: 100 mL    Norepinephrine: 3.5 mL  Total IN: 103.5 mL    OUT:    Indwelling Catheter - Urethral (mL): 175 mL  Total OUT: 175 mL    Total NET: -71.5 mL        Adult Advanced Hemodynamics Last 24 Hrs  CVP(mm Hg): --  CVP(cm H2O): --  CO: --  CI: --  PA: --  PA(mean): --  PCWP: --  SVR: --  SVRI: --  PVR: --  PVRI: --    CAPILLARY BLOOD GLUCOSE        LABS:                          13.0   14.40 )-----------( 201      ( 26 Mar 2022 06:15 )             38.1         139  |  101  |  8<L>  ----------------------------<  124<H>  3.4<L>   |  26  |  0.7    Ca    8.3<L>      26 Mar 2022 06:15  Mg     1.8         TPro  5.9<L>  /  Alb  3.2<L>  /  TBili  0.4  /  DBili  x   /  AST  17  /  ALT  12  /  AlkPhos  113      PT/INR - ( 24 Mar 2022 12:36 )   PT: 12.60 sec;   INR: 1.10 ratio         PTT - ( 24 Mar 2022 12:36 )  PTT:22.3 sec  Urinalysis Basic - ( 24 Mar 2022 12:36 )    Color: Light Yellow / Appearance: Clear / S.014 / pH: x  Gluc: x / Ketone: Negative  / Bili: Negative / Urobili: <2 mg/dL   Blood: x / Protein: Negative / Nitrite: Negative   Leuk Esterase: Large / RBC: 2 /HPF /  /HPF   Sq Epi: x / Non Sq Epi: 0 /HPF / Bacteria: Negative        Culture - Urine (collected 24 Mar 2022 12:36)  Source: Clean Catch Clean Catch (Midstream)  Final Report (26 Mar 2022 00:37):    <10,000 CFU/mL Normal Urogenital Mili    Culture - Blood (collected 24 Mar 2022 12:36)  Source: .Blood Blood-Peripheral  Preliminary Report (25 Mar 2022 22:01):    No growth to date.    Culture - Blood (collected 24 Mar 2022 12:36)  Source: .Blood Blood-Peripheral  Preliminary Report (25 Mar 2022 22:01):    No growth to date.      Home Medications:  ascorbic acid 500 mg oral tablet: 1 tab(s) orally once a day (29 Mar 2021 09:58)  collagenase 250 units/g topical ointment: 1 application topically 2 times a day (29 Mar 2021 09:58)  Ferrex-150 oral capsule: 1 cap(s) orally once a day (24 Mar 2022 20:45)  gabapentin 300 mg oral capsule: 1 cap(s) orally 2 times a day (24 Mar 2022 20:43)  melatonin 5 mg oral tablet: 1 tab(s) orally once a day (at bedtime) (19 Mar 2021 10:22)  midodrine 10 mg oral tablet: 1 tab(s) orally 3 times a day (29 Mar 2021 09:58)  Multiple Vitamins oral tablet: 1 tab(s) orally once a day (29 Mar 2021 09:58)  raloxifene 60 mg oral tablet: 1 tab(s) orally once a day (19 Mar 2021 10:22)  sodium hypochlorite 0.25% topical solution: 1 application topically 2 times a day (29 Mar 2021 09:58)  tiZANidine 2 mg oral tablet: 1 tab(s) orally 2 times a day (24 Mar 2022 20:42)  Vitamin D3 2000 intl units (50 mcg) oral capsule: 1 tab(s) orally once a day with meal (19 Mar 2021 10:22)    HOSPITAL MEDICATIONS:  MEDICATIONS  (STANDING):  cefepime   IVPB 2000 milliGRAM(s) IV Intermittent every 8 hours  chlorhexidine 4% Liquid 1 Application(s) Topical <User Schedule>  collagenase Ointment 1 Application(s) Topical two times a day  enoxaparin Injectable 40 milliGRAM(s) SubCutaneous every 24 hours  gabapentin Solution 300 milliGRAM(s) Oral every 12 hours  melatonin 5 milliGRAM(s) Oral at bedtime  metroNIDAZOLE  IVPB 500 milliGRAM(s) IV Intermittent every 8 hours  midodrine. 10 milliGRAM(s) Oral three times a day  multivitamin 1 Tablet(s) Oral daily  mupirocin 2% Ointment 1 Application(s) Topical two times a day  norepinephrine Infusion 0.05 MICROgram(s)/kG/Min (6.56 mL/Hr) IV Continuous <Continuous>  pantoprazole  Injectable 40 milliGRAM(s) IV Push daily  potassium chloride  20 mEq/100 mL IVPB 20 milliEquivalent(s) IV Intermittent once  raloxifene 60 milliGRAM(s) Oral daily  vancomycin  IVPB 1000 milliGRAM(s) IV Intermittent every 12 hours    MEDICATIONS  (PRN):      REVIEW OF SYSTEMS:  CONSTITUTIONAL: [X] all negative; [ ] weakness, [ ] fevers, [ ] chills  EYES/ENT: [X] all negative; [ ] visual changes, [ ] vertigo, [ ] throat pain   NECK: [X] all negative; [ ] pain, [ ] stiffness  RESPIRATORY: [] all negative, [ ] cough, [ ] wheezing, [ ] hemoptysis, [ ] shortness of breath  CARDIOVASCULAR: [] all negative; [ ] chest pain, [ ] palpitations, [ ] orthopnea  GASTROINTESTINAL: [X] all negative; [ ]abdominal pain, [ ] nausea, [ ] vomiting, [ ] hematemesis, [ ] diarrhea, [ ] constipation, [ ] melena, [ ] hematochezia.  GENITOURINARY: [X] all negative; [ ] dysuria, [ ] frequency, [ ] hematuria  NEUROLOGICAL: [X] all negative; [ ] numbness, [ ] weakness  SKIN: [X] all negative; [ ] itching, [ ] burning, [ ] rashes, [ ] lesions   All other review of systems is negative unless indicated above.    [  ] Unable to assess ROS because     PHYSICAL EXAM:          CONSTITUTIONAL: Well-developed; well-nourished; in no acute distress.   	SKIN: warm, dry  	HEAD: Normocephalic; atraumatic.  	EYES: PERRL, EOM, no conj injection, sclera clear  	ENT: No nasal discharge; airway clear.  	NECK: Supple; non tender.  No midline ttp ctls  	CARD: S1, S2 normal; no murmurs, gallops, or rubs. Regular rate and rhythm. 2+ RPs and DPs bilat, no carotid bruits, no pedal   edema, no calf pain b/l  	RESP: CTA  bilat good air movement No wheezes, rales or rhonchi.  	ABD: Soft, not tender, not distended, no CVA ttp no rebound or guarding, bowel sounds present  	EXT: Normal ROM.  No clubbing, cyanosis or edema.   	  	NEURO: Alert, awake, motor 5/5 R, 5/5 L        RADIOLOGY:  xray    I spent 45 minutes of critical care time ; more than 50% of visit was spent counseling and/or examining patient, reviewing vitals, labs, medications, imaging and discussing with the team goals of care to prevent life-threatening in this patient who is at high risk for deterioration or death due to:   ICU Attending Progress Daily Note     26 Mar 2022 08:35    more awake still on pressors and midodrin  He has history of Down syndrome    Osteoporosis    Mild anemia    Neuropathy      Interval event for past 24 hr:  TEA ECHAVARRIA  56y had no event.   Current Complains:  TEA ECHAVARRIA has no new complains  HPI:  55y/o female with a pmhx of down syndrome, non-verbal, hypothyroidism, osteoporosis coming from [facility] here for eval multiple episodes of vomiting that began today. Pt was also found to be hypoxic and febrile. History was obtained from aid at bedside. This morning patient "vomited a lot", NBNB, then was found to be hypotensive and hypoxic to 86% on RA. Patient had been acting her usual self until then with no indication of distress. At baseline she sits in wheelchair, is nonverbal, and does not communicate in any way.    ED course: T 101, , BP min 71/36, 94% on 4L. UA positive. Initial lactate 2.2. CT showed bilateral interstitial pulmonary thickening, which may reflect aspiration pneumonitis, and bladder wall thickening.  She received azithromycin, ceftriaxone and 2.5L LR. Lactate improved to 1.8 but BP did not, so she was started on levophed.      Family: (foster mother): Ana Katz, [phone]  Consumer advisory board: Amy Rich, [phone]     (24 Mar 2022 20:31)    OBJECTIVE:  ICU Vital Signs Last 24 Hrs  T(C): 37.2 (26 Mar 2022 08:00), Max: 37.7 (25 Mar 2022 16:00)  T(F): 99 (26 Mar 2022 08:00), Max: 99.9 (25 Mar 2022 16:00)  HR: 66 (26 Mar 2022 08:00) (64 - 112)  BP: 116/67 (26 Mar 2022 08:00) (75/49 - 127/80)  BP(mean): 94 (26 Mar 2022 08:00) (56 - 102)  ABP: --  ABP(mean): --  RR: 17 (26 Mar 2022 08:00) (11 - 31)  SpO2: 97% (26 Mar 2022 08:25) (92% - 99%)    I&O's Summary    25 Mar 2022 07:01  -  26 Mar 2022 07:00  --------------------------------------------------------  IN: 1131 mL / OUT: 2950 mL / NET: -1819 mL    26 Mar 2022 07:  -  26 Mar 2022 08:35  --------------------------------------------------------  IN: 103.5 mL / OUT: 175 mL / NET: -71.5 mL      I&O's Detail    25 Mar 2022 07:  -  26 Mar 2022 07:00  --------------------------------------------------------  IN:    IV PiggyBack: 550 mL    Lactated Ringers Bolus: 500 mL    Norepinephrine: 81 mL  Total IN: 1131 mL    OUT:    Indwelling Catheter - Urethral (mL): 2950 mL  Total OUT: 2950 mL    Total NET: -1819 mL      26 Mar 2022 07:  -  26 Mar 2022 08:35  --------------------------------------------------------  IN:    IV PiggyBack: 100 mL    Norepinephrine: 3.5 mL  Total IN: 103.5 mL    OUT:    Indwelling Catheter - Urethral (mL): 175 mL  Total OUT: 175 mL    Total NET: -71.5 mL            CAPILLARY BLOOD GLUCOSE        LABS:                          13.0   14.40 )-----------( 201      ( 26 Mar 2022 06:15 )             38.1         139  |  101  |  8<L>  ----------------------------<  124<H>  3.4<L>   |  26  |  0.7    Ca    8.3<L>      26 Mar 2022 06:15  Mg     1.8         TPro  5.9<L>  /  Alb  3.2<L>  /  TBili  0.4  /  DBili  x   /  AST  17  /  ALT  12  /  AlkPhos  113      PT/INR - ( 24 Mar 2022 12:36 )   PT: 12.60 sec;   INR: 1.10 ratio         PTT - ( 24 Mar 2022 12:36 )  PTT:22.3 sec  Urinalysis Basic - ( 24 Mar 2022 12:36 )    Color: Light Yellow / Appearance: Clear / S.014 / pH: x  Gluc: x / Ketone: Negative  / Bili: Negative / Urobili: <2 mg/dL   Blood: x / Protein: Negative / Nitrite: Negative   Leuk Esterase: Large / RBC: 2 /HPF /  /HPF   Sq Epi: x / Non Sq Epi: 0 /HPF / Bacteria: Negative        Culture - Urine (collected 24 Mar 2022 12:36)  Source: Clean Catch Clean Catch (Midstream)  Final Report (26 Mar 2022 00:37):    <10,000 CFU/mL Normal Urogenital Mili    Culture - Blood (collected 24 Mar 2022 12:36)  Source: .Blood Blood-Peripheral  Preliminary Report (25 Mar 2022 22:01):    No growth to date.    Culture - Blood (collected 24 Mar 2022 12:36)  Source: .Blood Blood-Peripheral  Preliminary Report (25 Mar 2022 22:01):    No growth to date.      Home Medications:  ascorbic acid 500 mg oral tablet: 1 tab(s) orally once a day (29 Mar 2021 09:58)  collagenase 250 units/g topical ointment: 1 application topically 2 times a day (29 Mar 2021 09:58)  Ferrex-150 oral capsule: 1 cap(s) orally once a day (24 Mar 2022 20:45)  gabapentin 300 mg oral capsule: 1 cap(s) orally 2 times a day (24 Mar 2022 20:43)  melatonin 5 mg oral tablet: 1 tab(s) orally once a day (at bedtime) (19 Mar 2021 10:22)  midodrine 10 mg oral tablet: 1 tab(s) orally 3 times a day (29 Mar 2021 09:58)  Multiple Vitamins oral tablet: 1 tab(s) orally once a day (29 Mar 2021 09:58)  raloxifene 60 mg oral tablet: 1 tab(s) orally once a day (19 Mar 2021 10:22)  sodium hypochlorite 0.25% topical solution: 1 application topically 2 times a day (29 Mar 2021 09:58)  tiZANidine 2 mg oral tablet: 1 tab(s) orally 2 times a day (24 Mar 2022 20:42)  Vitamin D3 2000 intl units (50 mcg) oral capsule: 1 tab(s) orally once a day with meal (19 Mar 2021 10:22)    HOSPITAL MEDICATIONS:  MEDICATIONS  (STANDING):  cefepime   IVPB 2000 milliGRAM(s) IV Intermittent every 8 hours  chlorhexidine 4% Liquid 1 Application(s) Topical <User Schedule>  collagenase Ointment 1 Application(s) Topical two times a day  enoxaparin Injectable 40 milliGRAM(s) SubCutaneous every 24 hours  gabapentin Solution 300 milliGRAM(s) Oral every 12 hours  melatonin 5 milliGRAM(s) Oral at bedtime  metroNIDAZOLE  IVPB 500 milliGRAM(s) IV Intermittent every 8 hours  midodrine. 10 milliGRAM(s) Oral three times a day  multivitamin 1 Tablet(s) Oral daily  mupirocin 2% Ointment 1 Application(s) Topical two times a day  norepinephrine Infusion 0.05 MICROgram(s)/kG/Min (6.56 mL/Hr) IV Continuous <Continuous>  pantoprazole  Injectable 40 milliGRAM(s) IV Push daily  potassium chloride  20 mEq/100 mL IVPB 20 milliEquivalent(s) IV Intermittent once  raloxifene 60 milliGRAM(s) Oral daily  vancomycin  IVPB 1000 milliGRAM(s) IV Intermittent every 12 hours    MEDICATIONS  (PRN):      REVIEW OF SYSTEMS:  CONSTITUTIONAL: [X] all negative; [ ] weakness, [ ] fevers, [ ] chills  EYES/ENT: [X] all negative; [ ] visual changes, [ ] vertigo, [ ] throat pain   NECK: [X] all negative; [ ] pain, [ ] stiffness  RESPIRATORY: [] all negative, [ ] cough, [ ] wheezing, [ ] hemoptysis, [ ] shortness of breath  CARDIOVASCULAR: [] all negative; [ ] chest pain, [ ] palpitations, [ ] orthopnea  GASTROINTESTINAL: [X] all negative; [ ]abdominal pain, [ ] nausea, [ ] vomiting, [ ] hematemesis, [ ] diarrhea, [ ] constipation, [ ] melena, [ ] hematochezia.  GENITOURINARY: [X] all negative; [ ] dysuria, [ ] frequency, [ ] hematuria  NEUROLOGICAL: [X] all negative; [ ] numbness, [ ] weakness  SKIN: [X] all negative; [ ] itching, [ ] burning, [ ] rashes, [ ] lesions   All other review of systems is negative unless indicated above.    [  ] Unable to assess ROS because     PHYSICAL EXAM:          CONSTITUTIONAL: Well-developed; well-nourished; in no acute distress.   	SKIN: warm, dry  	HEAD: Normocephalic; atraumatic.  	EYES: PERRL, EOM, no conj injection, sclera clear  	ENT: No nasal discharge; airway clear.  	NECK: Supple; non tender.  No midline ttp ctls  	CARD: S1, S2 normal; no murmurs, gallops, or rubs. Regular rate and rhythm. 2+ RPs and DPs bilat, no carotid bruits, no pedal   edema, no calf pain b/l  	RESP: CTA  bilat good air movement No wheezes, rales or rhonchi.  	ABD: Soft, not tender, not distended, no CVA ttp no rebound or guarding, bowel sounds present  	EXT: Normal ROM.  No clubbing, cyanosis or edema.   	  	NEURO: Alert, awake, motor 5/5 R, 5/5 L        RADIOLOGY:  xray read by herminia jerome   I spent 45 minutes of critical care time ; more than 50% of visit was spent counseling and/or examining patient, reviewing vitals, labs, medications, imaging and discussing with the team goals of care to prevent life-threatening in this patient who is at high risk for deterioration or death due to:

## 2022-03-26 NOTE — PROGRESS NOTE ADULT - SUBJECTIVE AND OBJECTIVE BOX
JERICHO TEA  56y  Female    t max 99.9  vitals better  mrsa+  INTERVAL EVENTS:    T(C): 37.5 (22 @ 04:00), Max: 37.7 (22 @ 08:00)  HR: 68 (22 @ 07:00) (64 - 112)  BP: 99/53 (22 @ 07:00) (75/49 - 127/80)  RR: 17 (22 @ 07:00) (11 - 31)  SpO2: 97% (22 @ 07:00) (92% - 99%)  Wt(kg): --Vital Signs Last 24 Hrs  T(C): 37.5 (26 Mar 2022 04:00), Max: 37.7 (25 Mar 2022 08:00)  T(F): 99.5 (26 Mar 2022 04:00), Max: 99.9 (25 Mar 2022 08:00)  HR: 68 (26 Mar 2022 07:00) (64 - 112)  BP: 99/53 (26 Mar 2022 07:00) (75/49 - 127/80)  BP(mean): 76 (26 Mar 2022 07:00) (56 - 102)  RR: 17 (26 Mar 2022 07:00) (11 - )  SpO2: 97% (26 Mar 2022 07:00) (92% - 99%)    PHYSICAL EXAM:  GENERAL: resting comfortably  NECK: Supple, No JVD, Normal thyroid  CHEST/LUNG: scattered creps  HEART: S1, S2, Regular rate and rhythm;   ABDOMEN: Soft, Nontender, Nondistended; Bowel sounds present  EXTREMITIES: skin lesions- chronic  SKIN: chronic lesions    LABS:                          13.0   14.40 )-----------( 201      ( 26 Mar 2022 06:15 )             38.1         139  |  101  |  8<L>  ----------------------------<  124<H>  3.4<L>   |  26  |  0.7    Ca    8.3<L>      26 Mar 2022 06:15  Mg     1.8         TPro  5.9<L>  /  Alb  3.2<L>  /  TBili  0.4  /  DBili  x   /  AST  17  /  ALT  12  /  AlkPhos  113  03-26    PT/INR - ( 24 Mar 2022 12:36 )   PT: 12.60 sec;   INR: 1.10 ratio         PTT - ( 24 Mar 2022 12:36 )  PTT:22.3 sec  Urinalysis Basic - ( 24 Mar 2022 12:36 )    Color: Light Yellow / Appearance: Clear / S.014 / pH: x  Gluc: x / Ketone: Negative  / Bili: Negative / Urobili: <2 mg/dL   Blood: x / Protein: Negative / Nitrite: Negative   Leuk Esterase: Large / RBC: 2 /HPF /  /HPF   Sq Epi: x / Non Sq Epi: 0 /HPF / Bacteria: Negative        Culture - Urine (collected 24 Mar 2022 12:36)  Source: Clean Catch Clean Catch (Midstream)  Final Report (26 Mar 2022 00:37):    <10,000 CFU/mL Normal Urogenital Mili    Culture - Blood (collected 24 Mar 2022 12:36)  Source: .Blood Blood-Peripheral  Preliminary Report (25 Mar 2022 22:01):    No growth to date.    Culture - Blood (collected 24 Mar 2022 12:36)  Source: .Blood Blood-Peripheral  Preliminary Report (25 Mar 2022 22:01):    No growth to date.          cefepime   IVPB 2000 milliGRAM(s) IV Intermittent every 8 hours  chlorhexidine 4% Liquid 1 Application(s) Topical <User Schedule>  collagenase Ointment 1 Application(s) Topical two times a day  enoxaparin Injectable 40 milliGRAM(s) SubCutaneous every 24 hours  gabapentin Solution 300 milliGRAM(s) Oral every 12 hours  melatonin 5 milliGRAM(s) Oral at bedtime  metroNIDAZOLE  IVPB 500 milliGRAM(s) IV Intermittent every 8 hours  midodrine. 10 milliGRAM(s) Oral three times a day  multivitamin 1 Tablet(s) Oral daily  mupirocin 2% Ointment 1 Application(s) Topical two times a day  norepinephrine Infusion 0.05 MICROgram(s)/kG/Min IV Continuous <Continuous>  pantoprazole  Injectable 40 milliGRAM(s) IV Push daily  raloxifene 60 milliGRAM(s) Oral daily  vancomycin  IVPB 1000 milliGRAM(s) IV Intermittent every 12 hours      RADIOLOGY & ADDITIONAL TESTS:    case discussed with the resident  Available consult notes reviewed    Assessment and plan:     MRSA+  urine culture negative  clinically improving  diligent skin care  cont antibiotics   management as per icu team

## 2022-03-26 NOTE — PHARMACOTHERAPY INTERVENTION NOTE - COMMENTS
-recommended vanc level prior to 4th dose (3/26 1800)
recommended changing pantoprazole 40mg suspension daily
Per progress note pt has hypothyroidism but not listed as an active issue on profile.  No meds for hypothyroidism on profile nor med rec.  Spoke with MD to clarify.

## 2022-03-27 LAB
ALBUMIN SERPL ELPH-MCNC: 2.9 G/DL — LOW (ref 3.5–5.2)
ALP SERPL-CCNC: 106 U/L — SIGNIFICANT CHANGE UP (ref 30–115)
ALT FLD-CCNC: 11 U/L — SIGNIFICANT CHANGE UP (ref 0–41)
ANION GAP SERPL CALC-SCNC: 10 MMOL/L — SIGNIFICANT CHANGE UP (ref 7–14)
AST SERPL-CCNC: 13 U/L — SIGNIFICANT CHANGE UP (ref 0–41)
BASOPHILS # BLD AUTO: 0.05 K/UL — SIGNIFICANT CHANGE UP (ref 0–0.2)
BASOPHILS NFR BLD AUTO: 0.4 % — SIGNIFICANT CHANGE UP (ref 0–1)
BILIRUB SERPL-MCNC: 0.3 MG/DL — SIGNIFICANT CHANGE UP (ref 0.2–1.2)
BUN SERPL-MCNC: 7 MG/DL — LOW (ref 10–20)
CALCIUM SERPL-MCNC: 8 MG/DL — LOW (ref 8.5–10.1)
CHLORIDE SERPL-SCNC: 104 MMOL/L — SIGNIFICANT CHANGE UP (ref 98–110)
CO2 SERPL-SCNC: 25 MMOL/L — SIGNIFICANT CHANGE UP (ref 17–32)
CREAT SERPL-MCNC: 0.6 MG/DL — LOW (ref 0.7–1.5)
EGFR: 106 ML/MIN/1.73M2 — SIGNIFICANT CHANGE UP
EOSINOPHIL # BLD AUTO: 0.01 K/UL — SIGNIFICANT CHANGE UP (ref 0–0.7)
EOSINOPHIL NFR BLD AUTO: 0.1 % — SIGNIFICANT CHANGE UP (ref 0–8)
GLUCOSE SERPL-MCNC: 186 MG/DL — HIGH (ref 70–99)
HCT VFR BLD CALC: 40 % — SIGNIFICANT CHANGE UP (ref 37–47)
HGB BLD-MCNC: 13.3 G/DL — SIGNIFICANT CHANGE UP (ref 12–16)
IMM GRANULOCYTES NFR BLD AUTO: 0.5 % — HIGH (ref 0.1–0.3)
LYMPHOCYTES # BLD AUTO: 0.98 K/UL — LOW (ref 1.2–3.4)
LYMPHOCYTES # BLD AUTO: 7.3 % — LOW (ref 20.5–51.1)
MAGNESIUM SERPL-MCNC: 1.9 MG/DL — SIGNIFICANT CHANGE UP (ref 1.8–2.4)
MCHC RBC-ENTMCNC: 32.2 PG — HIGH (ref 27–31)
MCHC RBC-ENTMCNC: 33.3 G/DL — SIGNIFICANT CHANGE UP (ref 32–37)
MCV RBC AUTO: 96.9 FL — SIGNIFICANT CHANGE UP (ref 81–99)
MONOCYTES # BLD AUTO: 0.25 K/UL — SIGNIFICANT CHANGE UP (ref 0.1–0.6)
MONOCYTES NFR BLD AUTO: 1.9 % — SIGNIFICANT CHANGE UP (ref 1.7–9.3)
NEUTROPHILS # BLD AUTO: 12.06 K/UL — HIGH (ref 1.4–6.5)
NEUTROPHILS NFR BLD AUTO: 89.8 % — HIGH (ref 42.2–75.2)
NRBC # BLD: 0 /100 WBCS — SIGNIFICANT CHANGE UP (ref 0–0)
PLATELET # BLD AUTO: 243 K/UL — SIGNIFICANT CHANGE UP (ref 130–400)
POTASSIUM SERPL-MCNC: 3.6 MMOL/L — SIGNIFICANT CHANGE UP (ref 3.5–5)
POTASSIUM SERPL-SCNC: 3.6 MMOL/L — SIGNIFICANT CHANGE UP (ref 3.5–5)
PROT SERPL-MCNC: 5.8 G/DL — LOW (ref 6–8)
RBC # BLD: 4.13 M/UL — LOW (ref 4.2–5.4)
RBC # FLD: 13.8 % — SIGNIFICANT CHANGE UP (ref 11.5–14.5)
SODIUM SERPL-SCNC: 139 MMOL/L — SIGNIFICANT CHANGE UP (ref 135–146)
WBC # BLD: 13.42 K/UL — HIGH (ref 4.8–10.8)
WBC # FLD AUTO: 13.42 K/UL — HIGH (ref 4.8–10.8)

## 2022-03-27 PROCEDURE — 71045 X-RAY EXAM CHEST 1 VIEW: CPT | Mod: 26

## 2022-03-27 PROCEDURE — 99291 CRITICAL CARE FIRST HOUR: CPT

## 2022-03-27 RX ORDER — SODIUM CHLORIDE 9 MG/ML
250 INJECTION INTRAMUSCULAR; INTRAVENOUS; SUBCUTANEOUS ONCE
Refills: 0 | Status: COMPLETED | OUTPATIENT
Start: 2022-03-27 | End: 2022-03-27

## 2022-03-27 RX ORDER — POTASSIUM CHLORIDE 20 MEQ
20 PACKET (EA) ORAL ONCE
Refills: 0 | Status: COMPLETED | OUTPATIENT
Start: 2022-03-27 | End: 2022-03-27

## 2022-03-27 RX ORDER — MIDODRINE HYDROCHLORIDE 2.5 MG/1
10 TABLET ORAL EVERY 8 HOURS
Refills: 0 | Status: DISCONTINUED | OUTPATIENT
Start: 2022-03-27 | End: 2022-04-01

## 2022-03-27 RX ORDER — SODIUM CHLORIDE 9 MG/ML
1000 INJECTION INTRAMUSCULAR; INTRAVENOUS; SUBCUTANEOUS
Refills: 0 | Status: DISCONTINUED | OUTPATIENT
Start: 2022-03-27 | End: 2022-03-29

## 2022-03-27 RX ORDER — SODIUM CHLORIDE 9 MG/ML
500 INJECTION INTRAMUSCULAR; INTRAVENOUS; SUBCUTANEOUS ONCE
Refills: 0 | Status: COMPLETED | OUTPATIENT
Start: 2022-03-27 | End: 2022-03-27

## 2022-03-27 RX ADMIN — SODIUM CHLORIDE 125 MILLILITER(S): 9 INJECTION INTRAMUSCULAR; INTRAVENOUS; SUBCUTANEOUS at 10:00

## 2022-03-27 RX ADMIN — Medication 50 MILLIGRAM(S): at 14:59

## 2022-03-27 RX ADMIN — Medication 250 MILLIGRAM(S): at 05:04

## 2022-03-27 RX ADMIN — MIDODRINE HYDROCHLORIDE 10 MILLIGRAM(S): 2.5 TABLET ORAL at 14:59

## 2022-03-27 RX ADMIN — Medication 100 MILLIGRAM(S): at 05:04

## 2022-03-27 RX ADMIN — SODIUM CHLORIDE 125 MILLILITER(S): 9 INJECTION INTRAMUSCULAR; INTRAVENOUS; SUBCUTANEOUS at 17:25

## 2022-03-27 RX ADMIN — Medication 250 MILLIGRAM(S): at 17:26

## 2022-03-27 RX ADMIN — Medication 5 MILLIGRAM(S): at 21:25

## 2022-03-27 RX ADMIN — Medication 1 APPLICATION(S): at 05:18

## 2022-03-27 RX ADMIN — MUPIROCIN 1 APPLICATION(S): 20 OINTMENT TOPICAL at 05:18

## 2022-03-27 RX ADMIN — GABAPENTIN 300 MILLIGRAM(S): 400 CAPSULE ORAL at 17:28

## 2022-03-27 RX ADMIN — Medication 50 MILLIEQUIVALENT(S): at 11:23

## 2022-03-27 RX ADMIN — CEFEPIME 100 MILLIGRAM(S): 1 INJECTION, POWDER, FOR SOLUTION INTRAMUSCULAR; INTRAVENOUS at 05:04

## 2022-03-27 RX ADMIN — CEFEPIME 100 MILLIGRAM(S): 1 INJECTION, POWDER, FOR SOLUTION INTRAMUSCULAR; INTRAVENOUS at 14:55

## 2022-03-27 RX ADMIN — Medication 50 MILLIGRAM(S): at 05:03

## 2022-03-27 RX ADMIN — Medication 50 MILLIGRAM(S): at 21:26

## 2022-03-27 RX ADMIN — Medication 100 MILLIGRAM(S): at 13:12

## 2022-03-27 RX ADMIN — SODIUM CHLORIDE 500 MILLILITER(S): 9 INJECTION INTRAMUSCULAR; INTRAVENOUS; SUBCUTANEOUS at 03:14

## 2022-03-27 RX ADMIN — MIDODRINE HYDROCHLORIDE 10 MILLIGRAM(S): 2.5 TABLET ORAL at 21:25

## 2022-03-27 RX ADMIN — MIDODRINE HYDROCHLORIDE 10 MILLIGRAM(S): 2.5 TABLET ORAL at 05:03

## 2022-03-27 RX ADMIN — RALOXIFENE HYDROCHLORIDE 60 MILLIGRAM(S): 60 TABLET, COATED ORAL at 11:59

## 2022-03-27 RX ADMIN — CEFEPIME 100 MILLIGRAM(S): 1 INJECTION, POWDER, FOR SOLUTION INTRAMUSCULAR; INTRAVENOUS at 21:25

## 2022-03-27 RX ADMIN — Medication 100 MILLIGRAM(S): at 21:26

## 2022-03-27 RX ADMIN — Medication 1 TABLET(S): at 11:59

## 2022-03-27 RX ADMIN — SODIUM CHLORIDE 500 MILLILITER(S): 9 INJECTION INTRAMUSCULAR; INTRAVENOUS; SUBCUTANEOUS at 08:55

## 2022-03-27 RX ADMIN — CHLORHEXIDINE GLUCONATE 1 APPLICATION(S): 213 SOLUTION TOPICAL at 05:18

## 2022-03-27 RX ADMIN — MUPIROCIN 1 APPLICATION(S): 20 OINTMENT TOPICAL at 17:26

## 2022-03-27 RX ADMIN — Medication 1 APPLICATION(S): at 17:27

## 2022-03-27 RX ADMIN — GABAPENTIN 300 MILLIGRAM(S): 400 CAPSULE ORAL at 05:03

## 2022-03-27 RX ADMIN — PANTOPRAZOLE SODIUM 40 MILLIGRAM(S): 20 TABLET, DELAYED RELEASE ORAL at 11:59

## 2022-03-27 NOTE — PROGRESS NOTE ADULT - SUBJECTIVE AND OBJECTIVE BOX
HPI:  55y/o female with a pmhx of down syndrome, non-verbal, hypothyroidism, osteoporosis coming from [facility] here for eval multiple episodes of vomiting that began today. Pt was also found to be hypoxic and febrile. History was obtained from aid at bedside. This morning patient "vomited a lot", NBNB, then was found to be hypotensive and hypoxic to 86% on RA. Patient had been acting her usual self until then with no indication of distress. At baseline she sits in wheelchair, is nonverbal, and does not communicate in any way.    ED course: T 101, , BP min 71/36, 94% on 4L. UA positive. Initial lactate 2.2. CT showed bilateral interstitial pulmonary thickening, which may reflect aspiration pneumonitis, and bladder wall thickening.  She received azithromycin, ceftriaxone and 2.5L LR. Lactate improved to 1.8 but BP did not, so she was started on levophed.      Family: (foster mother): Ana Watersar, [phone]  Consumer advisory board: Amy Rich, [phone]     (24 Mar 2022 20:31)      INTERVAL HISTORY: No acute events overnight. On low dose levo 0.08 overnight.     PAST MEDICAL & SURGICAL HISTORY  Down syndrome    Osteoporosis    Mild anemia    Neuropathy    S/P debridement  of R hip on 3/2/21        ALLERGIES:  No Known Allergies      MEDICATIONS:  MEDICATIONS  (STANDING):  cefepime   IVPB 2000 milliGRAM(s) IV Intermittent every 8 hours  chlorhexidine 4% Liquid 1 Application(s) Topical <User Schedule>  collagenase Ointment 1 Application(s) Topical two times a day  enoxaparin Injectable 40 milliGRAM(s) SubCutaneous every 24 hours  gabapentin Solution 300 milliGRAM(s) Oral every 12 hours  hydrocortisone sodium succinate Injectable 50 milliGRAM(s) IV Push every 8 hours  melatonin 5 milliGRAM(s) Oral at bedtime  metroNIDAZOLE  IVPB 500 milliGRAM(s) IV Intermittent every 8 hours  midodrine. 10 milliGRAM(s) Oral every 8 hours  multivitamin 1 Tablet(s) Oral daily  mupirocin 2% Ointment 1 Application(s) Topical two times a day  norepinephrine Infusion 0.05 MICROgram(s)/kG/Min (6.56 mL/Hr) IV Continuous <Continuous>  pantoprazole   Suspension 40 milliGRAM(s) Oral daily  raloxifene 60 milliGRAM(s) Oral daily  sodium chloride 0.9%. 1000 milliLiter(s) (125 mL/Hr) IV Continuous <Continuous>  vancomycin  IVPB 1000 milliGRAM(s) IV Intermittent every 12 hours    MEDICATIONS  (PRN):      HOME MEDICATIONS:  Home Medications:  ascorbic acid 500 mg oral tablet: 1 tab(s) orally once a day (29 Mar 2021 09:58)  collagenase 250 units/g topical ointment: 1 application topically 2 times a day (29 Mar 2021 09:58)  Ferrex-150 oral capsule: 1 cap(s) orally once a day (24 Mar 2022 20:45)  gabapentin 300 mg oral capsule: 1 cap(s) orally 2 times a day (24 Mar 2022 20:43)  melatonin 5 mg oral tablet: 1 tab(s) orally once a day (at bedtime) (19 Mar 2021 10:22)  midodrine 10 mg oral tablet: 1 tab(s) orally 3 times a day (29 Mar 2021 09:58)  Multiple Vitamins oral tablet: 1 tab(s) orally once a day (29 Mar 2021 09:58)  raloxifene 60 mg oral tablet: 1 tab(s) orally once a day (19 Mar 2021 10:22)  sodium hypochlorite 0.25% topical solution: 1 application topically 2 times a day (29 Mar 2021 09:58)  tiZANidine 2 mg oral tablet: 1 tab(s) orally 2 times a day (24 Mar 2022 20:42)  Vitamin D3 2000 intl units (50 mcg) oral capsule: 1 tab(s) orally once a day with meal (19 Mar 2021 10:22)        OBJECTIVE:  ICU Vital Signs Last 24 Hrs  T(C): 36.4 (27 Mar 2022 12:00), Max: 37.2 (26 Mar 2022 16:00)  T(F): 97.5 (27 Mar 2022 12:00), Max: 98.9 (26 Mar 2022 16:00)  HR: 72 (27 Mar 2022 13:00) (52 - 112)  BP: 123/68 (27 Mar 2022 13:00) (80/43 - 153/82)  BP(mean): 82 (27 Mar 2022 13:00) (56 - 141)  ABP: --  ABP(mean): --  RR: 13 (27 Mar 2022 13:00) (10 - 29)  SpO2: 98% (27 Mar 2022 13:00) (92% - 100%)    26 Mar 2022 07:01  -  27 Mar 2022 07:00  --------------------------------------------------------  IN: 3732.8 mL / OUT: 2225 mL / NET: 1507.8 mL    27 Mar 2022 07:01  -  27 Mar 2022 13:47  --------------------------------------------------------  IN: 1114 mL / OUT: 545 mL / NET: 569 mL      Daily     Daily Weight in k.6 (27 Mar 2022 03:00)    PHYSICAL EXAM:  NEURO: patient is awake , alert  GEN: Not in acute distress  NECK: no thyroid enlargement, no JVD  LUNGS: bl coarse   CARDIOVASCULAR: S1/S2 present, RRR  ABD: Soft, non-tender, non-distended  EXT: No NANCY    LABS:                        13.3   13.42 )-----------( 243      ( 27 Mar 2022 04:30 )             40.0         139  |  104  |  7<L>  ----------------------------<  186<H>  3.6   |  25  |  0.6<L>    Ca    8.0<L>      27 Mar 2022 04:30  Mg     1.9         TPro  5.8<L>  /  Alb  2.9<L>  /  TBili  0.3  /  DBili  x   /  AST  13  /  ALT  11  /  AlkPhos  106          RADIOLOGY:  -CXR: 3/27 LLQ opacity visualized

## 2022-03-27 NOTE — PROGRESS NOTE ADULT - ASSESSMENT
55y/o female with a pmhx of down syndrome, non-verbal, hypothyroidism, osteoporosis presents after multiple episodes of vomiting admitted for septic shock.    #Septic shock on pressors POA  #Acute hypoxic respiratory failure  #Aspiration pneumonia/pneumonitis  #Acute cystitis  - Continue Vancomycin, Cefepime, Flagyl  - MRSA positive  - F/u BCx and Urinary Cx. urine negative, no growth to date in blood  - F/u procal, sputum culture. Strep/Legionella Ag both negative  - Taper Pressors as tolerated, patient on midodrine 10mg TID at home; continuos ivf   - patient on pureed diet with no liquids    #Systolic Murmur 2/2 possible AS  - TTE EF 65%    #Osteoporosis  -Continue Raloxifene    #Neuropathy  - Continue Gabapentin    #Downs Syndrome  - Non verbal, Wheelchair bound    # ? history of hypothyroidism  - not on synthroid at home, F/U TSH, T4                                                                              ----------------------------------------------------  # DVT prophylaxis: On lovenox  # GI prophylaxis: Protonix   # Diet: pureed, no liquids  # Code status: Full code   # Disposition: MICU

## 2022-03-27 NOTE — PROGRESS NOTE ADULT - ASSESSMENT
ASSESSMENT & PLAN    55y/o female with a pmhx of down syndrome, non-verbal, hypothyroidism, osteoporosis presents after multiple episodes of vomiting admitted for septic shock.    #Septic shock on pressors POA  #Acute hypoxic respiratory failure  #Aspiration pneumonia/pneumonitis  #Acute cystitis  - Continue Vancomycin, Cefepime, Flagyl  - MRSA positive  - F/u BCx and Urinary Cx. urine negative, no growth to date in blood  - F/u procal, sputum culture. Strep/Legionella Ag both negative  - Taper Pressors as tolerated, patient on midodrine 10mg TID at home  - patient on pureed diet with no liquids    #Systolic Murmur 2/2 possible AS  - TTE EF 65%    #Osteoporosis  -Continue Raloxifene    #Neuropathy  - Continue Gabapentin    #Downs Syndrome  - Non verbal, Wheelchair bound    # ? history of hypothyroidism  - not on synthroid at home, F/U TSH, T4                                                                              ----------------------------------------------------  # DVT prophylaxis: On lovenox  # GI prophylaxis: Protonix   # Diet: pureed, no liquids  # Code status: Full code   # Disposition: MICU, likely downgrade tomorrow     fluid 125ml/H

## 2022-03-27 NOTE — PROGRESS NOTE ADULT - SUBJECTIVE AND OBJECTIVE BOX
TEA ECHAVARRIA  56y  Female  afebrile  bp was lower again w decreased urine output-meds adjusted  alert. comfortable  no resp distress     INTERVAL EVENTS:    T(C): 36.6 (03-27-22 @ 08:00), Max: 37.2 (03-26-22 @ 16:00)  HR: 52 (03-27-22 @ 08:00) (52 - 112)  BP: 111/59 (03-27-22 @ 08:00) (57/43 - 142/69)  RR: 13 (03-27-22 @ 08:00) (9 - 29)  SpO2: 97% (03-27-22 @ 08:00) (92% - 99%)  Wt(kg): --Vital Signs Last 24 Hrs  T(C): 36.6 (27 Mar 2022 08:00), Max: 37.2 (26 Mar 2022 16:00)  T(F): 97.9 (27 Mar 2022 08:00), Max: 98.9 (26 Mar 2022 16:00)  HR: 52 (27 Mar 2022 08:00) (52 - 112)  BP: 111/59 (27 Mar 2022 08:00) (57/43 - 142/69)  BP(mean): 87 (27 Mar 2022 08:00) (48 - 103)  RR: 13 (27 Mar 2022 08:00) (9 - 29)  SpO2: 97% (27 Mar 2022 08:00) (92% - 99%)    PHYSICAL EXAM:  GENERAL: resting comfortably  NECK: Supple, No JVD, Normal thyroid  CHEST/LUNG: scattered creps  HEART: S1, S2, Regular rate and rhythm;   ABDOMEN: Soft, Nontender, Nondistended; Bowel sounds present  EXTREMITIES: No clubbing, cyanosis, or edema  SKIN: rt toe lesion- offload, local care    LABS:                          13.3   13.42 )-----------( 243      ( 27 Mar 2022 04:30 )             40.0     03-27    139  |  104  |  7<L>  ----------------------------<  186<H>  3.6   |  25  |  0.6<L>    Ca    8.0<L>      27 Mar 2022 04:30  Mg     1.9     03-27    TPro  5.8<L>  /  Alb  2.9<L>  /  TBili  0.3  /  DBili  x   /  AST  13  /  ALT  11  /  AlkPhos  106  03-27          Culture - Urine (collected 24 Mar 2022 12:36)  Source: Clean Catch Clean Catch (Midstream)  Final Report (26 Mar 2022 00:37):    <10,000 CFU/mL Normal Urogenital Mili    Culture - Blood (collected 24 Mar 2022 12:36)  Source: .Blood Blood-Peripheral  Preliminary Report (25 Mar 2022 22:01):    No growth to date.    Culture - Blood (collected 24 Mar 2022 12:36)  Source: .Blood Blood-Peripheral  Preliminary Report (25 Mar 2022 22:01):    No growth to date.          cefepime   IVPB 2000 milliGRAM(s) IV Intermittent every 8 hours  chlorhexidine 4% Liquid 1 Application(s) Topical <User Schedule>  collagenase Ointment 1 Application(s) Topical two times a day  enoxaparin Injectable 40 milliGRAM(s) SubCutaneous every 24 hours  gabapentin Solution 300 milliGRAM(s) Oral every 12 hours  hydrocortisone sodium succinate Injectable 50 milliGRAM(s) IV Push every 8 hours  melatonin 5 milliGRAM(s) Oral at bedtime  metroNIDAZOLE  IVPB 500 milliGRAM(s) IV Intermittent every 8 hours  midodrine. 10 milliGRAM(s) Oral every 8 hours  multivitamin 1 Tablet(s) Oral daily  mupirocin 2% Ointment 1 Application(s) Topical two times a day  norepinephrine Infusion 0.05 MICROgram(s)/kG/Min IV Continuous <Continuous>  pantoprazole   Suspension 40 milliGRAM(s) Oral daily  potassium chloride  20 mEq/100 mL IVPB 20 milliEquivalent(s) IV Intermittent once  raloxifene 60 milliGRAM(s) Oral daily  sodium chloride 0.9%. 1000 milliLiter(s) IV Continuous <Continuous>  vancomycin  IVPB 1000 milliGRAM(s) IV Intermittent every 12 hours      RADIOLOGY & ADDITIONAL TESTS:    case discussed with the resident  Available consult notes reviewed    Assessment and plan:       cont antibiotics, fluids, icu care  monitor bp  skin care  dvt prophylaxis  monitor urine output, renal function

## 2022-03-27 NOTE — PROGRESS NOTE ADULT - SUBJECTIVE AND OBJECTIVE BOX
CTU Attending Progress Daily Note     27 Mar 2022 08:38  POD# -   He has history of Down syndrome    Osteoporosis    Mild anemia    Neuropathy      Interval event for past 24 hr:  TEA ECHAVARRIA  56y had no event.   Current Complains:  TEA ECHAVARRIA has no new complains  HPI:  55y/o female with a pmhx of down syndrome, non-verbal, hypothyroidism, osteoporosis coming from [facility] here for eval multiple episodes of vomiting that began today. Pt was also found to be hypoxic and febrile. History was obtained from aid at bedside. This morning patient "vomited a lot", NBNB, then was found to be hypotensive and hypoxic to 86% on RA. Patient had been acting her usual self until then with no indication of distress. At baseline she sits in wheelchair, is nonverbal, and does not communicate in any way.    ED course: T 101, , BP min 71/36, 94% on 4L. UA positive. Initial lactate 2.2. CT showed bilateral interstitial pulmonary thickening, which may reflect aspiration pneumonitis, and bladder wall thickening.  She received azithromycin, ceftriaxone and 2.5L LR. Lactate improved to 1.8 but BP did not, so she was started on levophed.      Family: (foster mother): Ana Katz, [phone]  Consumer advisory board: Amy Rich, [phone]     (24 Mar 2022 20:31)    OBJECTIVE:  ICU Vital Signs Last 24 Hrs  T(C): 36.6 (27 Mar 2022 08:00), Max: 37.2 (26 Mar 2022 16:00)  T(F): 97.9 (27 Mar 2022 08:00), Max: 98.9 (26 Mar 2022 16:00)  HR: 52 (27 Mar 2022 08:00) (52 - 112)  BP: 111/59 (27 Mar 2022 08:00) (57/43 - 142/69)  BP(mean): 87 (27 Mar 2022 08:00) (48 - 103)  ABP: --  ABP(mean): --  RR: 13 (27 Mar 2022 08:00) (9 - 29)  SpO2: 97% (27 Mar 2022 08:00) (92% - 99%)    I&O's Summary    26 Mar 2022 07:01  -  27 Mar 2022 07:00  --------------------------------------------------------  IN: 3732.8 mL / OUT: 2225 mL / NET: 1507.8 mL    27 Mar 2022 07:01  -  27 Mar 2022 08:38  --------------------------------------------------------  IN: 7.1 mL / OUT: 175 mL / NET: -167.9 mL      I&O's Detail    26 Mar 2022 07:01  -  27 Mar 2022 07:00  --------------------------------------------------------  IN:    IV PiggyBack: 1200 mL    Norepinephrine: 157.8 mL    sodium chloride 0.9%: 1625 mL    Sodium Chloride 0.9% Bolus: 250 mL    Sodium Chloride 0.9% Bolus: 500 mL  Total IN: 3732.8 mL    OUT:    Indwelling Catheter - Urethral (mL): 2225 mL  Total OUT: 2225 mL    Total NET: 1507.8 mL      27 Mar 2022 07:01  -  27 Mar 2022 08:38  --------------------------------------------------------  IN:    Norepinephrine: 7.1 mL  Total IN: 7.1 mL    OUT:    Indwelling Catheter - Urethral (mL): 175 mL  Total OUT: 175 mL    Total NET: -167.9 mL        Adult Advanced Hemodynamics Last 24 Hrs  CVP(mm Hg): --  CVP(cm H2O): --  CO: --  CI: --  PA: --  PA(mean): --  PCWP: --  SVR: --  SVRI: --  PVR: --  PVRI: --    CAPILLARY BLOOD GLUCOSE        LABS:                          13.3   13.42 )-----------( 243      ( 27 Mar 2022 04:30 )             40.0     03-27    139  |  104  |  7<L>  ----------------------------<  186<H>  3.6   |  25  |  0.6<L>    Ca    8.0<L>      27 Mar 2022 04:30  Mg     1.9     03-27    TPro  5.8<L>  /  Alb  2.9<L>  /  TBili  0.3  /  DBili  x   /  AST  13  /  ALT  11  /  AlkPhos  106  03-27          Culture - Urine (collected 24 Mar 2022 12:36)  Source: Clean Catch Clean Catch (Midstream)  Final Report (26 Mar 2022 00:37):    <10,000 CFU/mL Normal Urogenital Mili    Culture - Blood (collected 24 Mar 2022 12:36)  Source: .Blood Blood-Peripheral  Preliminary Report (25 Mar 2022 22:01):    No growth to date.    Culture - Blood (collected 24 Mar 2022 12:36)  Source: .Blood Blood-Peripheral  Preliminary Report (25 Mar 2022 22:01):    No growth to date.      Home Medications:  ascorbic acid 500 mg oral tablet: 1 tab(s) orally once a day (29 Mar 2021 09:58)  collagenase 250 units/g topical ointment: 1 application topically 2 times a day (29 Mar 2021 09:58)  Ferrex-150 oral capsule: 1 cap(s) orally once a day (24 Mar 2022 20:45)  gabapentin 300 mg oral capsule: 1 cap(s) orally 2 times a day (24 Mar 2022 20:43)  melatonin 5 mg oral tablet: 1 tab(s) orally once a day (at bedtime) (19 Mar 2021 10:22)  midodrine 10 mg oral tablet: 1 tab(s) orally 3 times a day (29 Mar 2021 09:58)  Multiple Vitamins oral tablet: 1 tab(s) orally once a day (29 Mar 2021 09:58)  raloxifene 60 mg oral tablet: 1 tab(s) orally once a day (19 Mar 2021 10:22)  sodium hypochlorite 0.25% topical solution: 1 application topically 2 times a day (29 Mar 2021 09:58)  tiZANidine 2 mg oral tablet: 1 tab(s) orally 2 times a day (24 Mar 2022 20:42)  Vitamin D3 2000 intl units (50 mcg) oral capsule: 1 tab(s) orally once a day with meal (19 Mar 2021 10:22)    HOSPITAL MEDICATIONS:  MEDICATIONS  (STANDING):  cefepime   IVPB 2000 milliGRAM(s) IV Intermittent every 8 hours  chlorhexidine 4% Liquid 1 Application(s) Topical <User Schedule>  collagenase Ointment 1 Application(s) Topical two times a day  enoxaparin Injectable 40 milliGRAM(s) SubCutaneous every 24 hours  gabapentin Solution 300 milliGRAM(s) Oral every 12 hours  hydrocortisone sodium succinate Injectable 50 milliGRAM(s) IV Push every 8 hours  melatonin 5 milliGRAM(s) Oral at bedtime  metroNIDAZOLE  IVPB 500 milliGRAM(s) IV Intermittent every 8 hours  midodrine. 10 milliGRAM(s) Oral three times a day  multivitamin 1 Tablet(s) Oral daily  mupirocin 2% Ointment 1 Application(s) Topical two times a day  norepinephrine Infusion 0.05 MICROgram(s)/kG/Min (6.56 mL/Hr) IV Continuous <Continuous>  pantoprazole   Suspension 40 milliGRAM(s) Oral daily  raloxifene 60 milliGRAM(s) Oral daily  sodium chloride 0.9%. 1000 milliLiter(s) (125 mL/Hr) IV Continuous <Continuous>  vancomycin  IVPB 1000 milliGRAM(s) IV Intermittent every 12 hours    MEDICATIONS  (PRN):      REVIEW OF SYSTEMS:  CONSTITUTIONAL: [X] all negative; [ ] weakness, [ ] fevers, [ ] chills  EYES/ENT: [X] all negative; [ ] visual changes, [ ] vertigo, [ ] throat pain   NECK: [X] all negative; [ ] pain, [ ] stiffness  RESPIRATORY: [] all negative, [ ] cough, [ ] wheezing, [ ] hemoptysis, [ ] shortness of breath  CARDIOVASCULAR: [] all negative; [ ] chest pain, [ ] palpitations, [ ] orthopnea  GASTROINTESTINAL: [X] all negative; [ ]abdominal pain, [ ] nausea, [ ] vomiting, [ ] hematemesis, [ ] diarrhea, [ ] constipation, [ ] melena, [ ] hematochezia.  GENITOURINARY: [X] all negative; [ ] dysuria, [ ] frequency, [ ] hematuria  NEUROLOGICAL: [X] all negative; [ ] numbness, [ ] weakness  SKIN: [X] all negative; [ ] itching, [ ] burning, [ ] rashes, [ ] lesions   All other review of systems is negative unless indicated above.    [  ] Unable to assess ROS because     PHYSICAL EXAM:          CONSTITUTIONAL: Well-developed; well-nourished; in no acute distress.   	SKIN: warm, dry  	HEAD: Normocephalic; atraumatic.  	EYES: PERRL, EOM, no conj injection, sclera clear  	ENT: No nasal discharge; airway clear.  	NECK: Supple; non tender.  No midline ttp ctls  	CARD: S1, S2 normal; no murmurs, gallops, or rubs. Regular rate and rhythm. 2+ RPs and DPs bilat, no carotid bruits, no pedal   edema, no calf pain b/l  	RESP: CTA  bilat good air movement No wheezes, rales or rhonchi.  	ABD: Soft, not tender, not distended, no CVA ttp no rebound or guarding, bowel sounds present  	EXT: Normal ROM.  No clubbing, cyanosis or edema.   	  	NEURO: Alert, awake, motor 5/5 R, 5/5 L        RADIOLOGY:  xray    I spent 45 minutes of critical care time ; more than 50% of visit was spent counseling and/or examining patient, reviewing vitals, labs, medications, imaging and discussing with the team goals of care to prevent life-threatening in this patient who is at high risk for deterioration or death due to:   ICU Attending Progress Daily Note     27 Mar 2022 08:38  still on pressors  He has history of Down syndrome    Osteoporosis    Mild anemia    Neuropathy      Interval event for past 24 hr:  TEA ECHAVARRIA  56y had no event.   Current Complains:  TEA ECHAVARRIA has no new complains  HPI:  55y/o female with a pmhx of down syndrome, non-verbal, hypothyroidism, osteoporosis coming from [facility] here for eval multiple episodes of vomiting that began today. Pt was also found to be hypoxic and febrile. History was obtained from aid at bedside. This morning patient "vomited a lot", NBNB, then was found to be hypotensive and hypoxic to 86% on RA. Patient had been acting her usual self until then with no indication of distress. At baseline she sits in wheelchair, is nonverbal, and does not communicate in any way.    ED course: T 101, , BP min 71/36, 94% on 4L. UA positive. Initial lactate 2.2. CT showed bilateral interstitial pulmonary thickening, which may reflect aspiration pneumonitis, and bladder wall thickening.  She received azithromycin, ceftriaxone and 2.5L LR. Lactate improved to 1.8 but BP did not, so she was started on levophed.      Family: (foster mother): Ana Katz, [phone]  Consumer advisory board: Amy Rich, [phone]     (24 Mar 2022 20:31)    OBJECTIVE:  ICU Vital Signs Last 24 Hrs  T(C): 36.6 (27 Mar 2022 08:00), Max: 37.2 (26 Mar 2022 16:00)  T(F): 97.9 (27 Mar 2022 08:00), Max: 98.9 (26 Mar 2022 16:00)  HR: 52 (27 Mar 2022 08:00) (52 - 112)  BP: 111/59 (27 Mar 2022 08:00) (57/43 - 142/69)  BP(mean): 87 (27 Mar 2022 08:00) (48 - 103)  ABP: --  ABP(mean): --  RR: 13 (27 Mar 2022 08:00) (9 - 29)  SpO2: 97% (27 Mar 2022 08:00) (92% - 99%)    I&O's Summary    26 Mar 2022 07:01  -  27 Mar 2022 07:00  --------------------------------------------------------  IN: 3732.8 mL / OUT: 2225 mL / NET: 1507.8 mL    27 Mar 2022 07:01  -  27 Mar 2022 08:38  --------------------------------------------------------  IN: 7.1 mL / OUT: 175 mL / NET: -167.9 mL      I&O's Detail    26 Mar 2022 07:01  -  27 Mar 2022 07:00  --------------------------------------------------------  IN:    IV PiggyBack: 1200 mL    Norepinephrine: 157.8 mL    sodium chloride 0.9%: 1625 mL    Sodium Chloride 0.9% Bolus: 250 mL    Sodium Chloride 0.9% Bolus: 500 mL  Total IN: 3732.8 mL    OUT:    Indwelling Catheter - Urethral (mL): 2225 mL  Total OUT: 2225 mL    Total NET: 1507.8 mL      27 Mar 2022 07:01  -  27 Mar 2022 08:38  --------------------------------------------------------  IN:    Norepinephrine: 7.1 mL  Total IN: 7.1 mL    OUT:    Indwelling Catheter - Urethral (mL): 175 mL  Total OUT: 175 mL    Total NET: -167.9 mL        Adult Advanced Hemodynamics Last 24 Hrs  CVP(mm Hg): --  CVP(cm H2O): --  CO: --  CI: --  PA: --  PA(mean): --  PCWP: --  SVR: --  SVRI: --  PVR: --  PVRI: --    CAPILLARY BLOOD GLUCOSE        LABS:                          13.3   13.42 )-----------( 243      ( 27 Mar 2022 04:30 )             40.0     03-27    139  |  104  |  7<L>  ----------------------------<  186<H>  3.6   |  25  |  0.6<L>    Ca    8.0<L>      27 Mar 2022 04:30  Mg     1.9     03-27    TPro  5.8<L>  /  Alb  2.9<L>  /  TBili  0.3  /  DBili  x   /  AST  13  /  ALT  11  /  AlkPhos  106  03-27          Culture - Urine (collected 24 Mar 2022 12:36)  Source: Clean Catch Clean Catch (Midstream)  Final Report (26 Mar 2022 00:37):    <10,000 CFU/mL Normal Urogenital Mili    Culture - Blood (collected 24 Mar 2022 12:36)  Source: .Blood Blood-Peripheral  Preliminary Report (25 Mar 2022 22:01):    No growth to date.    Culture - Blood (collected 24 Mar 2022 12:36)  Source: .Blood Blood-Peripheral  Preliminary Report (25 Mar 2022 22:01):    No growth to date.      Home Medications:  ascorbic acid 500 mg oral tablet: 1 tab(s) orally once a day (29 Mar 2021 09:58)  collagenase 250 units/g topical ointment: 1 application topically 2 times a day (29 Mar 2021 09:58)  Ferrex-150 oral capsule: 1 cap(s) orally once a day (24 Mar 2022 20:45)  gabapentin 300 mg oral capsule: 1 cap(s) orally 2 times a day (24 Mar 2022 20:43)  melatonin 5 mg oral tablet: 1 tab(s) orally once a day (at bedtime) (19 Mar 2021 10:22)  midodrine 10 mg oral tablet: 1 tab(s) orally 3 times a day (29 Mar 2021 09:58)  Multiple Vitamins oral tablet: 1 tab(s) orally once a day (29 Mar 2021 09:58)  raloxifene 60 mg oral tablet: 1 tab(s) orally once a day (19 Mar 2021 10:22)  sodium hypochlorite 0.25% topical solution: 1 application topically 2 times a day (29 Mar 2021 09:58)  tiZANidine 2 mg oral tablet: 1 tab(s) orally 2 times a day (24 Mar 2022 20:42)  Vitamin D3 2000 intl units (50 mcg) oral capsule: 1 tab(s) orally once a day with meal (19 Mar 2021 10:22)    HOSPITAL MEDICATIONS:  MEDICATIONS  (STANDING):  cefepime   IVPB 2000 milliGRAM(s) IV Intermittent every 8 hours  chlorhexidine 4% Liquid 1 Application(s) Topical <User Schedule>  collagenase Ointment 1 Application(s) Topical two times a day  enoxaparin Injectable 40 milliGRAM(s) SubCutaneous every 24 hours  gabapentin Solution 300 milliGRAM(s) Oral every 12 hours  hydrocortisone sodium succinate Injectable 50 milliGRAM(s) IV Push every 8 hours  melatonin 5 milliGRAM(s) Oral at bedtime  metroNIDAZOLE  IVPB 500 milliGRAM(s) IV Intermittent every 8 hours  midodrine. 10 milliGRAM(s) Oral three times a day  multivitamin 1 Tablet(s) Oral daily  mupirocin 2% Ointment 1 Application(s) Topical two times a day  norepinephrine Infusion 0.05 MICROgram(s)/kG/Min (6.56 mL/Hr) IV Continuous <Continuous>  pantoprazole   Suspension 40 milliGRAM(s) Oral daily  raloxifene 60 milliGRAM(s) Oral daily  sodium chloride 0.9%. 1000 milliLiter(s) (125 mL/Hr) IV Continuous <Continuous>  vancomycin  IVPB 1000 milliGRAM(s) IV Intermittent every 12 hours    MEDICATIONS  (PRN):      REVIEW OF SYSTEMS:  CONSTITUTIONAL: [X] all negative; [ ] weakness, [ ] fevers, [ ] chills  EYES/ENT: [X] all negative; [ ] visual changes, [ ] vertigo, [ ] throat pain   NECK: [X] all negative; [ ] pain, [ ] stiffness  RESPIRATORY: [] all negative, [ ] cough, [ ] wheezing, [ ] hemoptysis, [ ] shortness of breath  CARDIOVASCULAR: [] all negative; [ ] chest pain, [ ] palpitations, [ ] orthopnea  GASTROINTESTINAL: [X] all negative; [ ]abdominal pain, [ ] nausea, [ ] vomiting, [ ] hematemesis, [ ] diarrhea, [ ] constipation, [ ] melena, [ ] hematochezia.  GENITOURINARY: [X] all negative; [ ] dysuria, [ ] frequency, [ ] hematuria  NEUROLOGICAL: [X] all negative; [ ] numbness, [ ] weakness  SKIN: [X] all negative; [ ] itching, [ ] burning, [ ] rashes, [ ] lesions   All other review of systems is negative unless indicated above.    [  ] Unable to assess ROS because     PHYSICAL EXAM:          CONSTITUTIONAL: Well-developed; well-nourished; in no acute distress.   	SKIN: warm, dry  	HEAD: Normocephalic; atraumatic.  	EYES: PERRL, EOM, no conj injection, sclera clear  	ENT: No nasal discharge; airway clear.  	NECK: Supple; non tender.  No midline ttp ctls  	CARD: S1, S2 normal; no murmurs, gallops, or rubs. Regular rate and rhythm. 2+ RPs and DPs bilat, no carotid bruits, no pedal   edema, no calf pain b/l  	RESP: CTA  bilat good air movement No wheezes, rales or rhonchi.  	ABD: Soft, not tender, not distended, no CVA ttp no rebound or guarding, bowel sounds present  	EXT: Normal ROM.  No clubbing, cyanosis or edema.   	  	NEURO: Alert, awake, motor 5/5 R, 5/5 L        RADIOLOGY:  xray  < from: Xray Chest 1 View- PORTABLE-Routine (Xray Chest 1 View- PORTABLE-Routine in AM.) (03.27.22 @ 05:16) >  Impression:    Low lung volume.    Left perihilar opacity.    Right IJ line.    --- End of Report ---    < end of copied text >    I spent 45 minutes of critical care time ; more than 50% of visit was spent counseling and/or examining patient, reviewing vitals, labs, medications, imaging and discussing with the team goals of care to prevent life-threatening in this patient who is at high risk for deterioration or death due to:

## 2022-03-28 LAB
ALBUMIN SERPL ELPH-MCNC: 2.6 G/DL — LOW (ref 3.5–5.2)
ALP SERPL-CCNC: 93 U/L — SIGNIFICANT CHANGE UP (ref 30–115)
ALT FLD-CCNC: 9 U/L — SIGNIFICANT CHANGE UP (ref 0–41)
ANION GAP SERPL CALC-SCNC: 11 MMOL/L — SIGNIFICANT CHANGE UP (ref 7–14)
ANION GAP SERPL CALC-SCNC: 16 MMOL/L — HIGH (ref 7–14)
AST SERPL-CCNC: 11 U/L — SIGNIFICANT CHANGE UP (ref 0–41)
BASOPHILS # BLD AUTO: 0.05 K/UL — SIGNIFICANT CHANGE UP (ref 0–0.2)
BASOPHILS NFR BLD AUTO: 0.6 % — SIGNIFICANT CHANGE UP (ref 0–1)
BILIRUB SERPL-MCNC: <0.2 MG/DL — SIGNIFICANT CHANGE UP (ref 0.2–1.2)
BUN SERPL-MCNC: 10 MG/DL — SIGNIFICANT CHANGE UP (ref 10–20)
BUN SERPL-MCNC: 6 MG/DL — LOW (ref 10–20)
CALCIUM SERPL-MCNC: 7.8 MG/DL — LOW (ref 8.5–10.1)
CALCIUM SERPL-MCNC: 8 MG/DL — LOW (ref 8.5–10.1)
CHLORIDE SERPL-SCNC: 105 MMOL/L — SIGNIFICANT CHANGE UP (ref 98–110)
CHLORIDE SERPL-SCNC: 106 MMOL/L — SIGNIFICANT CHANGE UP (ref 98–110)
CO2 SERPL-SCNC: 24 MMOL/L — SIGNIFICANT CHANGE UP (ref 17–32)
CO2 SERPL-SCNC: 26 MMOL/L — SIGNIFICANT CHANGE UP (ref 17–32)
CREAT SERPL-MCNC: 0.6 MG/DL — LOW (ref 0.7–1.5)
CREAT SERPL-MCNC: 0.7 MG/DL — SIGNIFICANT CHANGE UP (ref 0.7–1.5)
D DIMER BLD IA.RAPID-MCNC: 176 NG/ML DDU — SIGNIFICANT CHANGE UP (ref 0–230)
EGFR: 102 ML/MIN/1.73M2 — SIGNIFICANT CHANGE UP
EGFR: 106 ML/MIN/1.73M2 — SIGNIFICANT CHANGE UP
EOSINOPHIL # BLD AUTO: 0 K/UL — SIGNIFICANT CHANGE UP (ref 0–0.7)
EOSINOPHIL NFR BLD AUTO: 0 % — SIGNIFICANT CHANGE UP (ref 0–8)
GLUCOSE SERPL-MCNC: 163 MG/DL — HIGH (ref 70–99)
GLUCOSE SERPL-MCNC: 172 MG/DL — HIGH (ref 70–99)
HCT VFR BLD CALC: 37.8 % — SIGNIFICANT CHANGE UP (ref 37–47)
HGB BLD-MCNC: 12.7 G/DL — SIGNIFICANT CHANGE UP (ref 12–16)
IMM GRANULOCYTES NFR BLD AUTO: 0.4 % — HIGH (ref 0.1–0.3)
LYMPHOCYTES # BLD AUTO: 1.23 K/UL — SIGNIFICANT CHANGE UP (ref 1.2–3.4)
LYMPHOCYTES # BLD AUTO: 14.7 % — LOW (ref 20.5–51.1)
MAGNESIUM SERPL-MCNC: 1.8 MG/DL — SIGNIFICANT CHANGE UP (ref 1.8–2.4)
MCHC RBC-ENTMCNC: 32.5 PG — HIGH (ref 27–31)
MCHC RBC-ENTMCNC: 33.6 G/DL — SIGNIFICANT CHANGE UP (ref 32–37)
MCV RBC AUTO: 96.7 FL — SIGNIFICANT CHANGE UP (ref 81–99)
MONOCYTES # BLD AUTO: 0.21 K/UL — SIGNIFICANT CHANGE UP (ref 0.1–0.6)
MONOCYTES NFR BLD AUTO: 2.5 % — SIGNIFICANT CHANGE UP (ref 1.7–9.3)
NEUTROPHILS # BLD AUTO: 6.85 K/UL — HIGH (ref 1.4–6.5)
NEUTROPHILS NFR BLD AUTO: 81.8 % — HIGH (ref 42.2–75.2)
NRBC # BLD: 0 /100 WBCS — SIGNIFICANT CHANGE UP (ref 0–0)
PLATELET # BLD AUTO: 263 K/UL — SIGNIFICANT CHANGE UP (ref 130–400)
POTASSIUM SERPL-MCNC: 3.3 MMOL/L — LOW (ref 3.5–5)
POTASSIUM SERPL-MCNC: 3.5 MMOL/L — SIGNIFICANT CHANGE UP (ref 3.5–5)
POTASSIUM SERPL-SCNC: 3.3 MMOL/L — LOW (ref 3.5–5)
POTASSIUM SERPL-SCNC: 3.5 MMOL/L — SIGNIFICANT CHANGE UP (ref 3.5–5)
PROT SERPL-MCNC: 5.3 G/DL — LOW (ref 6–8)
RBC # BLD: 3.91 M/UL — LOW (ref 4.2–5.4)
RBC # FLD: 13.9 % — SIGNIFICANT CHANGE UP (ref 11.5–14.5)
SODIUM SERPL-SCNC: 141 MMOL/L — SIGNIFICANT CHANGE UP (ref 135–146)
SODIUM SERPL-SCNC: 147 MMOL/L — HIGH (ref 135–146)
WBC # BLD: 8.37 K/UL — SIGNIFICANT CHANGE UP (ref 4.8–10.8)
WBC # FLD AUTO: 8.37 K/UL — SIGNIFICANT CHANGE UP (ref 4.8–10.8)

## 2022-03-28 PROCEDURE — 99222 1ST HOSP IP/OBS MODERATE 55: CPT

## 2022-03-28 PROCEDURE — 93010 ELECTROCARDIOGRAM REPORT: CPT

## 2022-03-28 PROCEDURE — 99233 SBSQ HOSP IP/OBS HIGH 50: CPT

## 2022-03-28 PROCEDURE — 71045 X-RAY EXAM CHEST 1 VIEW: CPT | Mod: 26

## 2022-03-28 PROCEDURE — 73630 X-RAY EXAM OF FOOT: CPT | Mod: 26,RT

## 2022-03-28 RX ORDER — POTASSIUM CHLORIDE 20 MEQ
20 PACKET (EA) ORAL
Refills: 0 | Status: COMPLETED | OUTPATIENT
Start: 2022-03-28 | End: 2022-03-28

## 2022-03-28 RX ADMIN — RALOXIFENE HYDROCHLORIDE 60 MILLIGRAM(S): 60 TABLET, COATED ORAL at 13:07

## 2022-03-28 RX ADMIN — CEFEPIME 100 MILLIGRAM(S): 1 INJECTION, POWDER, FOR SOLUTION INTRAMUSCULAR; INTRAVENOUS at 21:55

## 2022-03-28 RX ADMIN — MIDODRINE HYDROCHLORIDE 10 MILLIGRAM(S): 2.5 TABLET ORAL at 21:55

## 2022-03-28 RX ADMIN — CEFEPIME 100 MILLIGRAM(S): 1 INJECTION, POWDER, FOR SOLUTION INTRAMUSCULAR; INTRAVENOUS at 05:11

## 2022-03-28 RX ADMIN — Medication 50 MILLIEQUIVALENT(S): at 11:26

## 2022-03-28 RX ADMIN — GABAPENTIN 300 MILLIGRAM(S): 400 CAPSULE ORAL at 05:12

## 2022-03-28 RX ADMIN — Medication 250 MILLIGRAM(S): at 05:15

## 2022-03-28 RX ADMIN — MIDODRINE HYDROCHLORIDE 10 MILLIGRAM(S): 2.5 TABLET ORAL at 05:12

## 2022-03-28 RX ADMIN — Medication 50 MILLIGRAM(S): at 13:07

## 2022-03-28 RX ADMIN — Medication 50 MILLIGRAM(S): at 05:12

## 2022-03-28 RX ADMIN — Medication 5 MILLIGRAM(S): at 21:55

## 2022-03-28 RX ADMIN — PANTOPRAZOLE SODIUM 40 MILLIGRAM(S): 20 TABLET, DELAYED RELEASE ORAL at 13:10

## 2022-03-28 RX ADMIN — ENOXAPARIN SODIUM 40 MILLIGRAM(S): 100 INJECTION SUBCUTANEOUS at 00:16

## 2022-03-28 RX ADMIN — Medication 1 TABLET(S): at 13:08

## 2022-03-28 RX ADMIN — Medication 50 MILLIGRAM(S): at 21:56

## 2022-03-28 RX ADMIN — Medication 250 MILLIGRAM(S): at 18:41

## 2022-03-28 RX ADMIN — MIDODRINE HYDROCHLORIDE 10 MILLIGRAM(S): 2.5 TABLET ORAL at 13:07

## 2022-03-28 RX ADMIN — Medication 1 APPLICATION(S): at 06:00

## 2022-03-28 RX ADMIN — CEFEPIME 100 MILLIGRAM(S): 1 INJECTION, POWDER, FOR SOLUTION INTRAMUSCULAR; INTRAVENOUS at 13:09

## 2022-03-28 RX ADMIN — MUPIROCIN 1 APPLICATION(S): 20 OINTMENT TOPICAL at 05:14

## 2022-03-28 RX ADMIN — GABAPENTIN 300 MILLIGRAM(S): 400 CAPSULE ORAL at 18:41

## 2022-03-28 RX ADMIN — Medication 100 MILLIGRAM(S): at 05:11

## 2022-03-28 RX ADMIN — Medication 1 APPLICATION(S): at 18:41

## 2022-03-28 RX ADMIN — MUPIROCIN 1 APPLICATION(S): 20 OINTMENT TOPICAL at 18:41

## 2022-03-28 RX ADMIN — Medication 50 MILLIEQUIVALENT(S): at 06:50

## 2022-03-28 RX ADMIN — CHLORHEXIDINE GLUCONATE 1 APPLICATION(S): 213 SOLUTION TOPICAL at 05:12

## 2022-03-28 NOTE — PROGRESS NOTE ADULT - ASSESSMENT
IMPRESSION:    Septic shock  CAP improving.  MRSA positive   HO Down syndrome  HO low BP on Midodrine at home     PLAN:    CNS:  Continue present management    HEENT: Oral care    PULMONARY:  HOB @ 45 degrees.  Aspiration precautions     CARDIOVASCULAR:  Wean Pressors.  DC IVF.  ECHO noted.  EKG.  Cards eval     GI: GI prophylaxis.  Feeding per speech.  Bowel regimen     RENAL:  Follow up lytes.  Correct as needed    INFECTIOUS DISEASE: Follow up cultures.  Finish ABX course.  FLAVIO Flagyl     HEMATOLOGICAL:  DVT prophylaxis.  Dimer     ENDOCRINE:  Follow up FS.  Insulin protocol if needed.    MUSCULOSKELETAL:  Podiatry eval    Downgrade to floor pm     FLAVIO Madsen

## 2022-03-28 NOTE — CONSULT NOTE ADULT - ATTENDING COMMENTS
agree with above note   will continue to follow   no surgical intervention at this point agree with above note   will continue to follow   no surgical intervention at this point will wait for x-ray report  If OM patient would benefit from debridement of ulceration and resection of Infected bone to control infection  Ultimately may require amputation of right foot Great toe and first Metatarsal

## 2022-03-28 NOTE — CHART NOTE - NSCHARTNOTEFT_GEN_A_CORE
Transfer From ICU to Telemetry     Hospital Course:   57y/o female with a pmhx of down syndrome, non-verbal, hypothyroidism, osteoporosis coming from [facility] here for eval multiple episodes of vomiting that began today. Pt was also found to be hypoxic and febrile. History was obtained from aid at bedside. This morning patient "vomited a lot", NBNB, then was found to be hypotensive and hypoxic to 86% on RA. Patient had been acting her usual self until then with no indication of distress. At baseline she sits in wheelchair, is nonverbal, and does not communicate in any way.    ED course: T 101, , BP min 71/36, 94% on 4L. UA positive. Initial lactate 2.2. CT showed bilateral interstitial pulmonary thickening, which may reflect aspiration pneumonitis, and bladder wall thickening.  She received azithromycin, ceftriaxone and 2.5L LR. Lactate improved to 1.8 but BP did not, so she was started on levophed.    MICU Course: Pts clinical status has been improving since her admission into the ICU. Pt was admitted for Septic Shock 2/2 possible aspiration PNA requiring pressors. Pt was previously started on Cefepime, Vancomycin, and Metronidazole. Metronidazole was D/c on 03/28. Pt has been weaning off pressors, currently on Norepinephrine, and NS. Pt was found to be MRSA+. BCx and Urine Cx have shown NGT. Strep/Legionella Ag Negative. Pt has had some episodes of Bradycardia, Cardio Consult was placed. Echo was also done while in ICU, Showing an EF of 65%. Podiatry consult placed due to worsening Lower Extremity Ulcer. Today, 03/28, Will discontinue NS and wean off pressors. F/u D-Dimer, feeding per speech, Cardio consult, Podiatry consult.     A&P:  57y/o female with a pmhx of down syndrome, non-verbal, hypothyroidism, osteoporosis presents after multiple episodes of vomiting admitted for septic shock.    #Septic shock on pressors POA  #Acute hypoxic respiratory failure  #Aspiration pneumonia/pneumonitis  #Acute cystitis  - Continue Vancomycin, Cefepime, Flagyl  - MRSA positive  - F/u BCx and Urinary Cx. urine negative, no growth to date in blood  - F/u procal, sputum culture. Strep/Legionella Ag both negative  - Taper Pressors as tolerated, patient on midodrine 10mg TID at home; continuos ivf   - patient on pureed diet with no liquids    #Systolic Murmur 2/2 possible AS  - TTE EF 65%    #Osteoporosis  -Continue Raloxifene    #Neuropathy  - Continue Gabapentin    #Downs Syndrome  - Non verbal, Wheelchair bound    # ? history of hypothyroidism  - not on synthroid at home, F/U TSH, T4 Transfer From ICU to Telemetry     Hospital Course:   57y/o female with a pmhx of down syndrome, non-verbal, hypothyroidism, osteoporosis coming from [facility] here for eval multiple episodes of vomiting that began today. Pt was also found to be hypoxic and febrile. History was obtained from aid at bedside. This morning patient "vomited a lot", NBNB, then was found to be hypotensive and hypoxic to 86% on RA. Patient had been acting her usual self until then with no indication of distress. At baseline she sits in wheelchair, is nonverbal, and does not communicate in any way.    ED course: T 101, , BP min 71/36, 94% on 4L. UA positive. Initial lactate 2.2. CT showed bilateral interstitial pulmonary thickening, which may reflect aspiration pneumonitis, and bladder wall thickening.  She received azithromycin, ceftriaxone and 2.5L LR. Lactate improved to 1.8 but BP did not, so she was started on levophed.    MICU Course: Pts clinical status has been improving since her admission into the ICU. Pt was admitted for Septic Shock 2/2 possible aspiration PNA requiring pressors. Pt was previously started on Cefepime, Vancomycin, and Metronidazole. Metronidazole was D/c on 03/28. Pt has been weaning off pressors, currently on Norepinephrine, and NS. Pt was found to be MRSA+. BCx and Urine Cx have shown NGT. Strep/Legionella Ag Negative. Pt has had some episodes of Bradycardia, Cardio Consult was placed. Echo was also done while in ICU, Showing an EF of 65%. Podiatry consult placed due to worsening Lower Extremity Ulcer. Today, 03/28, Will discontinue NS and wean off pressors. F/u D-Dimer, feeding per speech, Cardio consult, Podiatry consult.     A&P:  57y/o female with a pmhx of down syndrome, non-verbal, hypothyroidism, osteoporosis presents after multiple episodes of vomiting admitted for septic shock.    #Septic shock on pressors POA  #Acute hypoxic respiratory failure  #Aspiration pneumonia/pneumonitis  #Acute cystitis  - Continue Vancomycin, Cefepime, Flagyl  - MRSA positive  - F/u BCx and Urinary Cx. urine negative, no growth to date in blood  - F/u procal, sputum culture. Strep/Legionella Ag both negative  - Taper Pressors as tolerated, patient on midodrine 10mg TID at home  - patient on pureed diet with no liquids  -03/28- Will wean off pressors and fluids, obtain D-Dimer  -03/28- D/c Flagyl, Send Podiatry Consult      #Systolic Murmur 2/2 possible AS  - TTE EF 65%    #Osteoporosis  -Continue Raloxifene    #Neuropathy  - Continue Gabapentin    #Downs Syndrome  - Non verbal, Wheelchair bound    # ? history of hypothyroidism  - not on synthroid at home, F/U TSH, T4 Transfer From ICU to Telemetry     Hospital Course:   57y/o female with a pmhx of down syndrome, non-verbal, hypothyroidism, osteoporosis coming from [facility] here for eval multiple episodes of vomiting that began today. Pt was also found to be hypoxic and febrile. History was obtained from aid at bedside. This morning patient "vomited a lot", NBNB, then was found to be hypotensive and hypoxic to 86% on RA. Patient had been acting her usual self until then with no indication of distress. At baseline she sits in wheelchair, is nonverbal, and does not communicate in any way.    ED course: T 101, , BP min 71/36, 94% on 4L. UA positive. Initial lactate 2.2. CT showed bilateral interstitial pulmonary thickening, which may reflect aspiration pneumonitis, and bladder wall thickening.  She received azithromycin, ceftriaxone and 2.5L LR. Lactate improved to 1.8 but BP did not, so she was started on levophed.    MICU Course: Pts clinical status has been improving since her admission into the ICU. Pt was admitted for Septic Shock 2/2 possible aspiration PNA requiring pressors. Pt was previously started on Cefepime, Vancomycin, and Metronidazole. Metronidazole was D/c on 03/28. Pt has been weaning off pressors, currently on Norepinephrine, and NS. Pt was found to be MRSA+. BCx and Urine Cx have shown NGT. Strep/Legionella Ag Negative. Pt has had some episodes of Bradycardia, Cardio Consult was placed. Echo was also done while in ICU, Showing an EF of 65%. Podiatry consult placed due to worsening Lower Extremity Ulcer. Today, 03/28, Will discontinue NS and wean off pressors. F/u D-Dimer, feeding per speech, Cardio consult, Podiatry consult.     A&P:  57y/o female with a pmhx of down syndrome, non-verbal, hypothyroidism, osteoporosis presents after multiple episodes of vomiting admitted for septic shock.    #Septic shock on pressors POA  #Acute hypoxic respiratory failure  #Aspiration pneumonia/pneumonitis  #Acute cystitis  - Continue Vancomycin, Cefepime, Flagyl  - MRSA positive  - F/u BCx and Urinary Cx. urine negative, no growth to date in blood  - F/u procal, sputum culture. Strep/Legionella Ag both negative  - Taper Pressors as tolerated, patient on midodrine 10mg TID at home  - patient on pureed diet with no liquids  -03/28- Will wean off pressors and fluids, obtain D-Dimer  -03/28- D/c Flagyl, Send Podiatry Consult      #Systolic Murmur 2/2 possible AS  - TTE EF 65%    #Osteoporosis  -Continue Raloxifene    #Neuropathy  - Continue Gabapentin    #Downs Syndrome  - Non verbal, Wheelchair bound    # ? history of hypothyroidism  - not on synthroid at home, F/U TSH, T4    For Follow Up:  D-Dimer  Speech Consult  Cardio consult  Podiatry consult  Wean off pressors

## 2022-03-28 NOTE — CONSULT NOTE ADULT - SUBJECTIVE AND OBJECTIVE BOX
HPI:  57y/o female with a pmhx of down syndrome, non-verbal, hypothyroidism, osteoporosis coming from [facility] here for eval multiple episodes of vomiting that began today. Pt was also found to be hypoxic and febrile. History was obtained from aid at bedside. This morning patient "vomited a lot", NBNB, then was found to be hypotensive and hypoxic to 86% on RA. Patient had been acting her usual self until then with no indication of distress. At baseline she sits in wheelchair, is nonverbal, and does not communicate in any way.    ED course: T 101, , BP min 71/36, 94% on 4L. UA positive. Initial lactate 2.2. CT showed bilateral interstitial pulmonary thickening, which may reflect aspiration pneumonitis, and bladder wall thickening.  She received azithromycin, ceftriaxone and 2.5L LR. Lactate improved to 1.8 but BP did not, so she was started on levophed.      Family: (foster mother): Ana Watersar, [phone]  Consumer advisory board: Amy Rich, [phone]     (24 Mar 2022 20:31)      Cardiology consulted for bradycardia. Telemetry reviewed. Patient is non-verbal    PAST MEDICAL & SURGICAL HISTORY  Down syndrome    Osteoporosis    Mild anemia    Neuropathy    S/P debridement  of R hip on 3/2/21        FAMILY HISTORY:  FAMILY HISTORY:  No pertinent family history in first degree relatives        SOCIAL HISTORY:  []smoker  []Alcohol  []Drug    ALLERGIES:  No Known Allergies      MEDICATIONS:  MEDICATIONS  (STANDING):  cefepime   IVPB 2000 milliGRAM(s) IV Intermittent every 8 hours  chlorhexidine 4% Liquid 1 Application(s) Topical <User Schedule>  collagenase Ointment 1 Application(s) Topical two times a day  enoxaparin Injectable 40 milliGRAM(s) SubCutaneous every 24 hours  gabapentin Solution 300 milliGRAM(s) Oral every 12 hours  hydrocortisone sodium succinate Injectable 50 milliGRAM(s) IV Push every 8 hours  melatonin 5 milliGRAM(s) Oral at bedtime  midodrine. 10 milliGRAM(s) Oral every 8 hours  multivitamin 1 Tablet(s) Oral daily  mupirocin 2% Ointment 1 Application(s) Topical two times a day  norepinephrine Infusion 0.05 MICROgram(s)/kG/Min (6.56 mL/Hr) IV Continuous <Continuous>  pantoprazole   Suspension 40 milliGRAM(s) Oral daily  potassium chloride  20 mEq/100 mL IVPB 20 milliEquivalent(s) IV Intermittent every 2 hours  raloxifene 60 milliGRAM(s) Oral daily  sodium chloride 0.9%. 1000 milliLiter(s) (30 mL/Hr) IV Continuous <Continuous>  vancomycin  IVPB 1000 milliGRAM(s) IV Intermittent every 12 hours    MEDICATIONS  (PRN):      HOME MEDICATIONS:  Home Medications:  ascorbic acid 500 mg oral tablet: 1 tab(s) orally once a day (29 Mar 2021 09:58)  collagenase 250 units/g topical ointment: 1 application topically 2 times a day (29 Mar 2021 09:58)  Ferrex-150 oral capsule: 1 cap(s) orally once a day (24 Mar 2022 20:45)  gabapentin 300 mg oral capsule: 1 cap(s) orally 2 times a day (24 Mar 2022 20:43)  melatonin 5 mg oral tablet: 1 tab(s) orally once a day (at bedtime) (19 Mar 2021 10:22)  midodrine 10 mg oral tablet: 1 tab(s) orally 3 times a day (29 Mar 2021 09:58)  Multiple Vitamins oral tablet: 1 tab(s) orally once a day (29 Mar 2021 09:58)  raloxifene 60 mg oral tablet: 1 tab(s) orally once a day (19 Mar 2021 10:22)  sodium hypochlorite 0.25% topical solution: 1 application topically 2 times a day (29 Mar 2021 09:58)  tiZANidine 2 mg oral tablet: 1 tab(s) orally 2 times a day (24 Mar 2022 20:42)  Vitamin D3 2000 intl units (50 mcg) oral capsule: 1 tab(s) orally once a day with meal (19 Mar 2021 10:22)      VITALS:   T(F): 98.9 (03-28 @ 08:00), Max: 99.9 (03-25 @ 08:00)  HR: 52 (03-28 @ 08:00) (36 - 112)  BP: 105/53 (03-28 @ 08:00) (57/43 - 153/82)  BP(mean): 73 (03-28 @ 08:00) (48 - 141)  RR: 13 (03-28 @ 08:00) (8 - 40)  SpO2: 96% (03-28 @ 08:00) (92% - 100%)    I&O's Summary    27 Mar 2022 07:01  -  28 Mar 2022 07:00  --------------------------------------------------------  IN: 3627.2 mL / OUT: 2495 mL / NET: 1132.2 mL        REVIEW OF SYSTEMS:  CONSTITUTIONAL: No weakness, fevers or chills  EYES: No visual changes  ENT: No vertigo or throat pain   NECK: No pain or stiffness  RESPIRATORY: No cough, wheezing, hemoptysis; No shortness of breath  CARDIOVASCULAR: No chest pain or palpitations  GASTROINTESTINAL: vomiting as described in HPI  GENITOURINARY: No dysuria, frequency or hematuria  NEUROLOGICAL: No numbness or weakness  SKIN: No itching, no rashes  MSK: No pain    PHYSICAL EXAM:  NEURO: patient is awake  GEN: Not in acute distress  NECK: no thyroid enlargement, no JVD  LUNGS: Clear to auscultation bilaterally   CARDIOVASCULAR: S1/S2 present, RRR , systolic murmur  ABD: Soft, non-tender, non-distended  EXT: No NANCY  SKIN: Intact    LABS:                        12.7   8.37  )-----------( 263      ( 28 Mar 2022 05:09 )             37.8     03-28    147<H>  |  105  |  6<L>  ----------------------------<  163<H>  3.3<L>   |  26  |  0.6<L>    Ca    8.0<L>      28 Mar 2022 05:09  Mg     1.8     03-28    TPro  5.3<L>  /  Alb  2.6<L>  /  TBili  <0.2  /  DBili  x   /  AST  11  /  ALT  9   /  AlkPhos  93  03-28              Troponin trend:            RADIOLOGY:  -CXR:  < from: Xray Chest 1 View- PORTABLE-Routine (Xray Chest 1 View- PORTABLE-Routine in AM.) (03.28.22 @ 06:19) >  Impression:    Limited study due to patient rotation.    Persistent left-sided opacities.    Previously seen right IJ line, now appears over midline, presumably   related to patient rotation. Attention on follow-up.    < end of copied text >    -TTE:    < from: TTE Echo Complete w/o Contrast w/ Doppler (03.25.22 @ 09:54) >  Summary:   1. Normal global left ventricular systolic function.   2. LV Ejection Fraction by Clemens's Method with a biplane EF of 65 %.   3. Normal left ventricular internal cavity size.   4. Normal left atrial size.   5. Normal right atrial size.   6. Noevidence of mitral valve regurgitation.   7. Mild tricuspid regurgitation.   8. LA volume Index is 17.6 ml/m² ml/m2.   9. Peak transaortic gradient equals 6.2 mmHg, mean transaortic gradient   equals 3.2 mmHg, the calculated aortic valve area equals 2.09 cm² by the   continuity equation consistent with mild aortic stenosis.    < end of copied text >  -CCTA:  -STRESS TEST:  -CATHETERIZATION:    ECG: NSR    TELEMETRY EVENTS: sinus bradycardia

## 2022-03-28 NOTE — PROGRESS NOTE ADULT - SUBJECTIVE AND OBJECTIVE BOX
Patient is a 56y old  Female who presents with a chief complaint of Aspiration pneumonia (27 Mar 2022 13:46)        Over Night Events:  on Low dose levophed.  On 2 liters O2.          ROS:     All ROS are negative except HPI         PHYSICAL EXAM    ICU Vital Signs Last 24 Hrs  T(C): 37.2 (28 Mar 2022 08:00), Max: 37.2 (28 Mar 2022 08:00)  T(F): 98.9 (28 Mar 2022 08:00), Max: 98.9 (28 Mar 2022 08:00)  HR: 52 (28 Mar 2022 08:00) (36 - 106)  BP: 105/53 (28 Mar 2022 08:00) (75/64 - 153/82)  BP(mean): 73 (28 Mar 2022 08:00) (60 - 141)  ABP: --  ABP(mean): --  RR: 13 (28 Mar 2022 08:00) (8 - 32)  SpO2: 96% (28 Mar 2022 08:00) (92% - 100%)      CONSTITUTIONAL:  Ill appearing in  NAD    ENT:   Airway patent,   Mouth with normal mucosa.   No thrush    EYES:   Pupils equal,   Round and reactive to light.    CARDIAC:   Normal rate,   Regular rhythm.    No edema      Vascular:  Normal systolic impulse  No Carotid bruits    RESPIRATORY:   No wheezing  Bilateral BS  Normal chest expansion  Not tachypneic,  No use of accessory muscles    GASTROINTESTINAL:  Abdomen soft,   Non-tender,   No guarding,   + BS    MUSCULOSKELETAL:   Range of motion is  limited,  No clubbing, cyanosis    NEUROLOGICAL:   Awake   Does not follow commands     SKIN:   Skin normal color for race,   Warm and dry  No evidence of rash.    PSYCHIATRIC:   Normal mood and affect.   No apparent risk to self or others.    HEMATOLOGICAL:  No cervical  lymphadenopathy.  no inguinal lymphadenopathy      03-27-22 @ 07:01  -  03-28-22 @ 07:00  --------------------------------------------------------  IN:    IV PiggyBack: 400 mL    Norepinephrine: 102.2 mL    sodium chloride 0.9%: 2625 mL    Sodium Chloride 0.9% Bolus: 500 mL  Total IN: 3627.2 mL    OUT:    Indwelling Catheter - Urethral (mL): 2495 mL  Total OUT: 2495 mL    Total NET: 1132.2 mL          LABS:                            12.7   8.37  )-----------( 263      ( 28 Mar 2022 05:09 )             37.8                                               03-28    147<H>  |  105  |  6<L>  ----------------------------<  163<H>  3.3<L>   |  26  |  0.6<L>    Ca    8.0<L>      28 Mar 2022 05:09  Mg     1.8     03-28    TPro  5.3<L>  /  Alb  2.6<L>  /  TBili  <0.2  /  DBili  x   /  AST  11  /  ALT  9   /  AlkPhos  93  03-28                                                                                           LIVER FUNCTIONS - ( 28 Mar 2022 05:09 )  Alb: 2.6 g/dL / Pro: 5.3 g/dL / ALK PHOS: 93 U/L / ALT: 9 U/L / AST: 11 U/L / GGT: x                                                                                                                                       MEDICATIONS  (STANDING):  cefepime   IVPB 2000 milliGRAM(s) IV Intermittent every 8 hours  chlorhexidine 4% Liquid 1 Application(s) Topical <User Schedule>  collagenase Ointment 1 Application(s) Topical two times a day  enoxaparin Injectable 40 milliGRAM(s) SubCutaneous every 24 hours  gabapentin Solution 300 milliGRAM(s) Oral every 12 hours  hydrocortisone sodium succinate Injectable 50 milliGRAM(s) IV Push every 8 hours  melatonin 5 milliGRAM(s) Oral at bedtime  metroNIDAZOLE  IVPB 500 milliGRAM(s) IV Intermittent every 8 hours  midodrine. 10 milliGRAM(s) Oral every 8 hours  multivitamin 1 Tablet(s) Oral daily  mupirocin 2% Ointment 1 Application(s) Topical two times a day  norepinephrine Infusion 0.05 MICROgram(s)/kG/Min (6.56 mL/Hr) IV Continuous <Continuous>  pantoprazole   Suspension 40 milliGRAM(s) Oral daily  potassium chloride  20 mEq/100 mL IVPB 20 milliEquivalent(s) IV Intermittent every 2 hours  raloxifene 60 milliGRAM(s) Oral daily  sodium chloride 0.9%. 1000 milliLiter(s) (125 mL/Hr) IV Continuous <Continuous>  vancomycin  IVPB 1000 milliGRAM(s) IV Intermittent every 12 hours    MEDICATIONS  (PRN):      New X-rays reviewed:                                                                                  ECHO    CXR interpreted by me:  No infiltrates

## 2022-03-28 NOTE — CONSULT NOTE ADULT - SUBJECTIVE AND OBJECTIVE BOX
Podiatry Consult Note    Subjective:  TEA ECHAVARRIA is a pleasant well-nourished, well developed 56y Female in no acute distress, alert awake, and oriented to person, place and time.   Patient is a 56y old  Female who presents with a chief complaint of Aspiration pneumonia (28 Mar 2022 15:45). Pt seen & evaluated at bedside. Pt is non-verbal and contracted.     HPI:  57y/o female with a pmhx of down syndrome, non-verbal, hypothyroidism, osteoporosis coming from [facility] here for eval multiple episodes of vomiting that began today. Pt was also found to be hypoxic and febrile. History was obtained from aid at bedside. This morning patient "vomited a lot", NBNB, then was found to be hypotensive and hypoxic to 86% on RA. Patient had been acting her usual self until then with no indication of distress. At baseline she sits in wheelchair, is nonverbal, and does not communicate in any way.    ED course: T 101, , BP min 71/36, 94% on 4L. UA positive. Initial lactate 2.2. CT showed bilateral interstitial pulmonary thickening, which may reflect aspiration pneumonitis, and bladder wall thickening.  She received azithromycin, ceftriaxone and 2.5L LR. Lactate improved to 1.8 but BP did not, so she was started on levophed.      Family: (foster mother): Ana Katz, [phone]  Consumer advisory board: Amy Rich, [phone]     (24 Mar 2022 20:31)      Past Medical History and Surgical History  PAST MEDICAL & SURGICAL HISTORY:  Down syndrome    Osteoporosis    Mild anemia    Neuropathy    S/P debridement  of R hip on 3/2/21    Objective:  Vital Signs Last 24 Hrs  T(C): 36.7 (28 Mar 2022 12:00), Max: 37.2 (28 Mar 2022 08:00)  T(F): 98.1 (28 Mar 2022 12:00), Max: 98.9 (28 Mar 2022 08:00)  HR: 64 (28 Mar 2022 15:00) (36 - 106)  BP: 117/62 (28 Mar 2022 13:00) (79/51 - 137/62)  BP(mean): 81 (28 Mar 2022 13:00) (59 - 125)  RR: 11 (28 Mar 2022 15:00) (8 - 32)  SpO2: 87% (28 Mar 2022 13:00) (87% - 100%)                        12.7   8.37  )-----------( 263      ( 28 Mar 2022 05:09 )             37.8                 03-28    147<H>  |  105  |  6<L>  ----------------------------<  163<H>  3.3<L>   |  26  |  0.6<L>    Ca    8.0<L>      28 Mar 2022 05:09  Mg     1.8     03-28    TPro  5.3<L>  /  Alb  2.6<L>  /  TBili  <0.2  /  DBili  x   /  AST  11  /  ALT  9   /  AlkPhos  93  03-28        Physical Exam - Lower Extremity Focused:   #Right Lower Extremity  Derm:   Open Right Plantar Pressure Ulcer @ 1st Submetatarsal;  Wound Base Fibrogranular (50% Fibrous, 50% Granular); Probes to Bone w/ Minimal Serosanguinous Drainage;  Toes are warm, capillary refill is instant to the toes;  Mild Erythema and Skin Desquamation to Periwound;     Vascular: DP and PT Pulses Palpable; Foot is Warm to Warm to the touch   Neuro: Not assessable 2/2 Mental Status  MSK: Not assessable 2/2 Mental Status     Assessment:  Right Forefoot Plantar Ulcer (1st Submetatarsal head) - Probes to Bone    Plan:  Chart reviewed and Patient evaluated. All Questions and Concerns Addressed and Answered  Discussed diagnosis and treatment with patient  Wound Flushed w/ NS; Wound Dressed w/ Xeroform / DSD / Kerlix   Local Wound Care; As Stated Above   Wound Culture Obtained; Sent to Pathology; Pending Results   XR Imaging Right Foot; Pending Results  Recommend; Lower Extremity Arterial Duplex B/L; ESR/CRP;  Discussed Plan w/ Attending    Podiatry        Podiatry Consult Note    Subjective:  TEA ECHAVARRIA is a pleasant well-nourished, well developed 56y Female in no acute distress, alert awake, and not oriented to person, place and time.   Patient is a 56y old  Female who presents with a chief complaint of Aspiration pneumonia (28 Mar 2022 15:45). Pt seen & evaluated at bedside. Pt is non-verbal and contracted.     HPI:  55y/o female with a pmhx of down syndrome, non-verbal, hypothyroidism, osteoporosis coming from [facility] here for eval multiple episodes of vomiting that began today. Pt was also found to be hypoxic and febrile. History was obtained from aid at bedside. This morning patient "vomited a lot", NBNB, then was found to be hypotensive and hypoxic to 86% on RA. Patient had been acting her usual self until then with no indication of distress. At baseline she sits in wheelchair, is nonverbal, and does not communicate in any way.    ED course: T 101, , BP min 71/36, 94% on 4L. UA positive. Initial lactate 2.2. CT showed bilateral interstitial pulmonary thickening, which may reflect aspiration pneumonitis, and bladder wall thickening.  She received azithromycin, ceftriaxone and 2.5L LR. Lactate improved to 1.8 but BP did not, so she was started on levophed.      Family: (foster mother): Ana Katz, [phone]  Consumer advisory board: Amy Rich, [phone]     (24 Mar 2022 20:31)      Past Medical History and Surgical History  PAST MEDICAL & SURGICAL HISTORY:  Down syndrome    Osteoporosis    Mild anemia    Neuropathy    S/P debridement  of R hip on 3/2/21    Objective:  Vital Signs Last 24 Hrs  T(C): 36.7 (28 Mar 2022 12:00), Max: 37.2 (28 Mar 2022 08:00)  T(F): 98.1 (28 Mar 2022 12:00), Max: 98.9 (28 Mar 2022 08:00)  HR: 64 (28 Mar 2022 15:00) (36 - 106)  BP: 117/62 (28 Mar 2022 13:00) (79/51 - 137/62)  BP(mean): 81 (28 Mar 2022 13:00) (59 - 125)  RR: 11 (28 Mar 2022 15:00) (8 - 32)  SpO2: 87% (28 Mar 2022 13:00) (87% - 100%)                        12.7   8.37  )-----------( 263      ( 28 Mar 2022 05:09 )             37.8                 03-28    147<H>  |  105  |  6<L>  ----------------------------<  163<H>  3.3<L>   |  26  |  0.6<L>    Ca    8.0<L>      28 Mar 2022 05:09  Mg     1.8     03-28    TPro  5.3<L>  /  Alb  2.6<L>  /  TBili  <0.2  /  DBili  x   /  AST  11  /  ALT  9   /  AlkPhos  93  03-28        Physical Exam - Lower Extremity Focused:   #Right Lower Extremity  Derm:   Open Right Plantar Pressure Ulcer @ 1st Submetatarsal;  Wound Base Fibrogranular (50% Fibrous, 50% Granular); Probes to Bone w/ Minimal Serosanguinous Drainage;  Toes are warm, capillary refill is instant to the toes;  Mild Erythema and Skin Desquamation to Periwound;     Vascular: DP and PT Pulses Palpable; Foot is Warm to Warm to the touch   Neuro: Not assessable 2/2 Mental Status  MSK: Not assessable 2/2 Mental Status     Assessment:  Right Forefoot Plantar Ulcer (1st Submetatarsal head) - Probes to Bone    Plan:  Chart reviewed and Patient evaluated. All Questions and Concerns Addressed and Answered  Discussed diagnosis and treatment with patient  Wound Flushed w/ NS; Wound Dressed w/ Xeroform / DSD / Kerlix   Local Wound Care; As Stated Above   Wound Culture Obtained; Sent to Pathology; Pending Results   XR Imaging Right Foot; Pending Results  Recommend; Lower Extremity Arterial Duplex B/L; ESR/CRP;  Discussed Plan w/ Attending    Podiatry

## 2022-03-28 NOTE — CONSULT NOTE ADULT - ASSESSMENT
56 y.o female patient with PMH as above admitted to MICU for treatment of septic shock; cardiology consulted for bradycardia    # Bradycardia  - Sinus bradycardia, lowest HR in high 30s; occurred at night while patient is asleep  - At the time of my exam, HR in 40s while asleep and increased to the 60s when patient woke-up  - TSH 4.7, T4 8.5  - Continue telemetry and re-assess after resolution of sepsis  - Monitor electrolytes and correct as needed  - INGE 56 y.o female patient with PMH as above admitted to MICU for treatment of septic shock; cardiology consulted for bradycardia    # Bradycardia  - Sinus bradycardia, lowest HR in high 30s; occurred at night while patient is asleep  - At the time of my exam, HR in 40s while asleep and increased to the 60s when patient woke-up  - TSH 4.7, T4 8.5  - Continue telemetry and re-assess after resolution of sepsis  - Monitor electrolytes and correct as needed  - MCOT    # Septic shock  - Continue care as per pulm/CC team

## 2022-03-29 LAB
ALBUMIN SERPL ELPH-MCNC: 2.8 G/DL — LOW (ref 3.5–5.2)
ALP SERPL-CCNC: 84 U/L — SIGNIFICANT CHANGE UP (ref 30–115)
ALT FLD-CCNC: 9 U/L — SIGNIFICANT CHANGE UP (ref 0–41)
ANION GAP SERPL CALC-SCNC: 11 MMOL/L — SIGNIFICANT CHANGE UP (ref 7–14)
AST SERPL-CCNC: 13 U/L — SIGNIFICANT CHANGE UP (ref 0–41)
BASOPHILS # BLD AUTO: 0.06 K/UL — SIGNIFICANT CHANGE UP (ref 0–0.2)
BASOPHILS NFR BLD AUTO: 0.8 % — SIGNIFICANT CHANGE UP (ref 0–1)
BILIRUB SERPL-MCNC: <0.2 MG/DL — SIGNIFICANT CHANGE UP (ref 0.2–1.2)
BUN SERPL-MCNC: 9 MG/DL — LOW (ref 10–20)
CALCIUM SERPL-MCNC: 8.3 MG/DL — LOW (ref 8.5–10.1)
CHLORIDE SERPL-SCNC: 104 MMOL/L — SIGNIFICANT CHANGE UP (ref 98–110)
CO2 SERPL-SCNC: 27 MMOL/L — SIGNIFICANT CHANGE UP (ref 17–32)
CREAT SERPL-MCNC: 0.7 MG/DL — SIGNIFICANT CHANGE UP (ref 0.7–1.5)
CULTURE RESULTS: SIGNIFICANT CHANGE UP
CULTURE RESULTS: SIGNIFICANT CHANGE UP
D DIMER BLD IA.RAPID-MCNC: 270 NG/ML DDU — HIGH (ref 0–230)
EGFR: 102 ML/MIN/1.73M2 — SIGNIFICANT CHANGE UP
EOSINOPHIL # BLD AUTO: 0.16 K/UL — SIGNIFICANT CHANGE UP (ref 0–0.7)
EOSINOPHIL NFR BLD AUTO: 2.1 % — SIGNIFICANT CHANGE UP (ref 0–8)
ERYTHROCYTE [SEDIMENTATION RATE] IN BLOOD: 40 MM/HR — HIGH (ref 0–20)
GLUCOSE SERPL-MCNC: 146 MG/DL — HIGH (ref 70–99)
HCT VFR BLD CALC: 40.6 % — SIGNIFICANT CHANGE UP (ref 37–47)
HGB BLD-MCNC: 13.9 G/DL — SIGNIFICANT CHANGE UP (ref 12–16)
IMM GRANULOCYTES NFR BLD AUTO: 0.4 % — HIGH (ref 0.1–0.3)
LYMPHOCYTES # BLD AUTO: 1.31 K/UL — SIGNIFICANT CHANGE UP (ref 1.2–3.4)
LYMPHOCYTES # BLD AUTO: 17.1 % — LOW (ref 20.5–51.1)
MAGNESIUM SERPL-MCNC: 1.8 MG/DL — SIGNIFICANT CHANGE UP (ref 1.8–2.4)
MCHC RBC-ENTMCNC: 32.5 PG — HIGH (ref 27–31)
MCHC RBC-ENTMCNC: 34.2 G/DL — SIGNIFICANT CHANGE UP (ref 32–37)
MCV RBC AUTO: 94.9 FL — SIGNIFICANT CHANGE UP (ref 81–99)
MONOCYTES # BLD AUTO: 0.3 K/UL — SIGNIFICANT CHANGE UP (ref 0.1–0.6)
MONOCYTES NFR BLD AUTO: 3.9 % — SIGNIFICANT CHANGE UP (ref 1.7–9.3)
NEUTROPHILS # BLD AUTO: 5.78 K/UL — SIGNIFICANT CHANGE UP (ref 1.4–6.5)
NEUTROPHILS NFR BLD AUTO: 75.7 % — HIGH (ref 42.2–75.2)
NRBC # BLD: 0 /100 WBCS — SIGNIFICANT CHANGE UP (ref 0–0)
PLATELET # BLD AUTO: 237 K/UL — SIGNIFICANT CHANGE UP (ref 130–400)
POTASSIUM SERPL-MCNC: 3.8 MMOL/L — SIGNIFICANT CHANGE UP (ref 3.5–5)
POTASSIUM SERPL-SCNC: 3.8 MMOL/L — SIGNIFICANT CHANGE UP (ref 3.5–5)
PROT SERPL-MCNC: 5.4 G/DL — LOW (ref 6–8)
RBC # BLD: 4.28 M/UL — SIGNIFICANT CHANGE UP (ref 4.2–5.4)
RBC # FLD: 14 % — SIGNIFICANT CHANGE UP (ref 11.5–14.5)
SODIUM SERPL-SCNC: 142 MMOL/L — SIGNIFICANT CHANGE UP (ref 135–146)
SPECIMEN SOURCE: SIGNIFICANT CHANGE UP
SPECIMEN SOURCE: SIGNIFICANT CHANGE UP
WBC # BLD: 7.64 K/UL — SIGNIFICANT CHANGE UP (ref 4.8–10.8)
WBC # FLD AUTO: 7.64 K/UL — SIGNIFICANT CHANGE UP (ref 4.8–10.8)

## 2022-03-29 PROCEDURE — 99233 SBSQ HOSP IP/OBS HIGH 50: CPT

## 2022-03-29 PROCEDURE — 99232 SBSQ HOSP IP/OBS MODERATE 35: CPT

## 2022-03-29 PROCEDURE — 93925 LOWER EXTREMITY STUDY: CPT | Mod: 26

## 2022-03-29 RX ADMIN — MUPIROCIN 1 APPLICATION(S): 20 OINTMENT TOPICAL at 06:05

## 2022-03-29 RX ADMIN — Medication 50 MILLIGRAM(S): at 05:00

## 2022-03-29 RX ADMIN — CEFEPIME 100 MILLIGRAM(S): 1 INJECTION, POWDER, FOR SOLUTION INTRAMUSCULAR; INTRAVENOUS at 21:01

## 2022-03-29 RX ADMIN — GABAPENTIN 300 MILLIGRAM(S): 400 CAPSULE ORAL at 06:04

## 2022-03-29 RX ADMIN — Medication 250 MILLIGRAM(S): at 17:26

## 2022-03-29 RX ADMIN — MIDODRINE HYDROCHLORIDE 10 MILLIGRAM(S): 2.5 TABLET ORAL at 21:00

## 2022-03-29 RX ADMIN — MIDODRINE HYDROCHLORIDE 10 MILLIGRAM(S): 2.5 TABLET ORAL at 06:04

## 2022-03-29 RX ADMIN — Medication 1 APPLICATION(S): at 17:26

## 2022-03-29 RX ADMIN — Medication 250 MILLIGRAM(S): at 06:04

## 2022-03-29 RX ADMIN — Medication 1 APPLICATION(S): at 06:04

## 2022-03-29 RX ADMIN — ENOXAPARIN SODIUM 40 MILLIGRAM(S): 100 INJECTION SUBCUTANEOUS at 00:53

## 2022-03-29 RX ADMIN — MIDODRINE HYDROCHLORIDE 10 MILLIGRAM(S): 2.5 TABLET ORAL at 14:25

## 2022-03-29 RX ADMIN — Medication 1 TABLET(S): at 12:32

## 2022-03-29 RX ADMIN — Medication 50 MILLIGRAM(S): at 14:25

## 2022-03-29 RX ADMIN — RALOXIFENE HYDROCHLORIDE 60 MILLIGRAM(S): 60 TABLET, COATED ORAL at 12:33

## 2022-03-29 RX ADMIN — GABAPENTIN 300 MILLIGRAM(S): 400 CAPSULE ORAL at 17:26

## 2022-03-29 RX ADMIN — Medication 5 MILLIGRAM(S): at 21:00

## 2022-03-29 RX ADMIN — CEFEPIME 100 MILLIGRAM(S): 1 INJECTION, POWDER, FOR SOLUTION INTRAMUSCULAR; INTRAVENOUS at 14:02

## 2022-03-29 RX ADMIN — CHLORHEXIDINE GLUCONATE 1 APPLICATION(S): 213 SOLUTION TOPICAL at 05:00

## 2022-03-29 RX ADMIN — Medication 50 MILLIGRAM(S): at 21:00

## 2022-03-29 RX ADMIN — PANTOPRAZOLE SODIUM 40 MILLIGRAM(S): 20 TABLET, DELAYED RELEASE ORAL at 12:31

## 2022-03-29 RX ADMIN — CEFEPIME 100 MILLIGRAM(S): 1 INJECTION, POWDER, FOR SOLUTION INTRAMUSCULAR; INTRAVENOUS at 05:00

## 2022-03-29 RX ADMIN — MUPIROCIN 1 APPLICATION(S): 20 OINTMENT TOPICAL at 17:26

## 2022-03-29 NOTE — CHART NOTE - NSCHARTNOTEFT_GEN_A_CORE
Registered Dietitian Follow-Up     Patient Profile Reviewed                           Yes [X]   No []     Nutrition History Previously Obtained        Yes [X]  No []       Pertinent Subjective Information: Seen at bedside during breakfast time. Observed accepting well with solids and liquids fed by CNA.      Pertinent Medical Interventions: Patient is a 57y/o female with a pmhx of down syndrome, non-verbal, hypothyroidism, osteoporosis presents after multiple episodes of vomiting admitted for septic shock. Now OFF PRESSORS. On ABX to complete course. OFF FLUIDS. For downgrade to floor.     Diet order:   Diet, Pureed:   Mildly Thick Liquids (MILDTHICKLIQS) (22 @ 10:52) [Active]    Anthropometrics:  · Height for BMI (FEET)	5 Feet  · Height for BMI (INCHES)	0 Inch(s)  · Height for BMI (CENTIMETERS)	152.4 Centimeter(s)  · Weight for BMI (lbs)	104.1 lb  Weight (kg): 47.2 (22 @ 21:00)  IBW: 47.6 kg  Daily Weight in k.4 (-29), Weight in k.6 (-27), Weight in k.2 (-26), Weight in k.2 (-25)  Weight Change: insignificant over last 4 days    MEDICATIONS  (STANDING):  cefepime   IVPB 2000 milliGRAM(s) IV Intermittent every 8 hours  chlorhexidine 4% Liquid 1 Application(s) Topical <User Schedule>  collagenase Ointment 1 Application(s) Topical two times a day  enoxaparin Injectable 40 milliGRAM(s) SubCutaneous every 24 hours  gabapentin Solution 300 milliGRAM(s) Oral every 12 hours  hydrocortisone sodium succinate Injectable 50 milliGRAM(s) IV Push every 8 hours  melatonin 5 milliGRAM(s) Oral at bedtime  midodrine. 10 milliGRAM(s) Oral every 8 hours  multivitamin 1 Tablet(s) Oral daily  mupirocin 2% Ointment 1 Application(s) Topical two times a day  norepinephrine Infusion 0.05 MICROgram(s)/kG/Min (6.56 mL/Hr) IV Continuous <Continuous>  pantoprazole   Suspension 40 milliGRAM(s) Oral daily  raloxifene 60 milliGRAM(s) Oral daily  vancomycin  IVPB 1000 milliGRAM(s) IV Intermittent every 12 hours    Pertinent Labs:                         13.9   7.64  )-----------( 237      ( 29 Mar 2022 05:30 )             40.6      @ 05:30: Na 142, BUN 9<L>, Cr 0.7, <H>, K+ 3.8, Phos --, Mg 1.8, Alk Phos 84, ALT/SGPT 9, AST/SGOT 13, HbA1c --   @ 20:00: Na 141, BUN 10, Cr 0.7, <H>, K+ 3.5, Phos --, Mg --, Alk Phos --, ALT/SGPT --, AST/SGOT --, HbA1c --    Physical Findings:  - Appearance: alert, nonverbal  - GI function: abdomen no distension noted; last BM  - Tubes: no feeding tubes  - Oral/Mouth cavity: S+S 3/28: +toleration of puree, moderately thick and mildly thick liquids w/o overt s/s of penetration/aspiration -> puree diet w/ mildly thick liquids  - Skin:  WOCN 3/25: Full thickness ulceration to R posterior big toe; R hip healed full thickness wound tunneled with scar tissue and epithelium; L heel blanchable redness      Nutrition Requirements:  Weight Used: CBW 46kg     Estimated Energy Needs    Continue []  Adjust [X] now stable  Adjusted Energy Recommendations:  0255-0558 kcal/day MSJ*1.2-1.3        Estimated Protein Needs    Continue []  Adjust [X] updated feeding wt  Adjusted Protein Recommendations:  55-64 gm/day 1.2-1.4g/kg wound healing        Estimated Fluid Needs        Continue []  Adjust [X] updated feeding wt  Adjusted Fluid Recommendations:  1920 mL/day 30ml/kg     Nutrient Intake: % most trays per flowsheets     [] Previous Nutrition Diagnosis: Increased Nutrient Needs (protein-kcal)            [X] Ongoing          [] Resolved    Nutrition Intervention      Goal/Expected Outcome: pt to continue to meet % est needs in the remaining of stay     Indicator/Monitoring: diet order, kcal intake, body composition, NFPE, labs  (lytes, BG, renal indices)    Recommendation:  Cont with current diet order  Weekly weights    RD spectra [id number]. Registered Dietitian Follow-Up     Patient Profile Reviewed                           Yes [X]   No []     Nutrition History Previously Obtained        Yes [X]  No []       Pertinent Subjective Information: Seen at bedside during breakfast time. Observed accepting well with solids and liquids fed by CNA.      Pertinent Medical Interventions: Patient is a 57y/o female with a pmhx of down syndrome, non-verbal, hypothyroidism, osteoporosis presents after multiple episodes of vomiting admitted for septic shock. Now OFF PRESSORS. On ABX to complete course. OFF FLUIDS. For downgrade to floor.     Diet order:   Diet, Pureed:   Mildly Thick Liquids (MILDTHICKLIQS) (22 @ 10:52) [Active]    Anthropometrics:  · Height for BMI (FEET)	5 Feet  · Height for BMI (INCHES)	0 Inch(s)  · Height for BMI (CENTIMETERS)	152.4 Centimeter(s)  · Weight for BMI (lbs)	104.1 lb  Weight (kg): 47.2 (22 @ 21:00)  IBW: 47.6 kg  Daily Weight in k.4 (-29), Weight in k.6 (-27), Weight in k.2 (-26), Weight in k.2 (-25)  Weight Change: insignificant over last 4 days    MEDICATIONS  (STANDING):  cefepime   IVPB 2000 milliGRAM(s) IV Intermittent every 8 hours  chlorhexidine 4% Liquid 1 Application(s) Topical <User Schedule>  collagenase Ointment 1 Application(s) Topical two times a day  enoxaparin Injectable 40 milliGRAM(s) SubCutaneous every 24 hours  gabapentin Solution 300 milliGRAM(s) Oral every 12 hours  hydrocortisone sodium succinate Injectable 50 milliGRAM(s) IV Push every 8 hours  melatonin 5 milliGRAM(s) Oral at bedtime  midodrine. 10 milliGRAM(s) Oral every 8 hours  multivitamin 1 Tablet(s) Oral daily  mupirocin 2% Ointment 1 Application(s) Topical two times a day  norepinephrine Infusion 0.05 MICROgram(s)/kG/Min (6.56 mL/Hr) IV Continuous <Continuous>  pantoprazole   Suspension 40 milliGRAM(s) Oral daily  raloxifene 60 milliGRAM(s) Oral daily  vancomycin  IVPB 1000 milliGRAM(s) IV Intermittent every 12 hours    Pertinent Labs:                         13.9   7.64  )-----------( 237      ( 29 Mar 2022 05:30 )             40.6      @ 05:30: Na 142, BUN 9<L>, Cr 0.7, <H>, K+ 3.8, Phos --, Mg 1.8, Alk Phos 84, ALT/SGPT 9, AST/SGOT 13, HbA1c --   @ 20:00: Na 141, BUN 10, Cr 0.7, <H>, K+ 3.5, Phos --, Mg --, Alk Phos --, ALT/SGPT --, AST/SGOT --, HbA1c --    Physical Findings:  - Appearance: alert, nonverbal  - GI function: abdomen no distension noted; last BM  - Tubes: no feeding tubes  - Oral/Mouth cavity: S+S 3/28: +toleration of puree, moderately thick and mildly thick liquids w/o overt s/s of penetration/aspiration -> puree diet w/ mildly thick liquids  - Skin:  WOCN 3/25: Full thickness ulceration to R posterior big toe; R hip healed full thickness wound tunneled with scar tissue and epithelium; L heel blanchable redness      Nutrition Requirements:  Weight Used: CBW 46kg     Estimated Energy Needs    Continue []  Adjust [X] now stable  Adjusted Energy Recommendations:  7106-5477 kcal/day MSJ*1.2-1.3        Estimated Protein Needs    Continue []  Adjust [X] updated feeding wt  Adjusted Protein Recommendations:  55-64 gm/day 1.2-1.4g/kg wound healing        Estimated Fluid Needs        Continue []  Adjust [X] updated feeding wt  Adjusted Fluid Recommendations:  1920 mL/day 30ml/kg     Nutrient Intake: % most trays per flowsheets     [] Previous Nutrition Diagnosis: Increased Nutrient Needs (protein-kcal)            [X] Ongoing          [] Resolved    Nutrition Intervention      Goal/Expected Outcome: pt to continue to meet % est needs in the remaining of stay     Indicator/Monitoring: diet order, kcal intake, body composition, NFPE, labs  (lytes, BG, renal indices)    Recommendation:  Cont with current diet order  Weekly weights    Patient assessed at moderate nutrition risk.   RD spectra [id number].

## 2022-03-29 NOTE — PROGRESS NOTE ADULT - ASSESSMENT
57y/o female with a pmhx of down syndrome, non-verbal, hypothyroidism, osteoporosis presents after multiple episodes of vomiting admitted for septic shock.    #Septic shock on pressors POA (resolved)  #Acute hypoxic respiratory failure  #Aspiration pneumonia/pneumonitis  #Acute cystitis  - MRSA positive  - F/u BCx and Urinary Cx. urine negative, no growth to date in blood  - F/u procal, sputum culture. Strep/Legionella Ag both negative  - Taper Pressors as tolerated, patient on midodrine 10mg TID at home  - patient on pureed diet with no liquids  Continue Vancomycin, Cefepime (7 day course)    #Systolic Murmur 2/2 possible AS  - TTE EF 65%    #Osteoporosis  -Continue Raloxifene    #Neuropathy  - Continue Gabapentin    #Downs Syndrome  - Non verbal, Wheelchair bound    # ? history of hypothyroidism  - not on synthroid at home  - TSH 4.1    For Follow Up:  Cardio consult  Podiatry consult  DOWNGRADE TO FLOORS 55y/o female with a pmhx of down syndrome, non-verbal, hypothyroidism, osteoporosis presents after multiple episodes of vomiting admitted for septic shock.    #Septic shock on pressors POA (resolved)  #Acute hypoxic respiratory failure  #Aspiration pneumonia/pneumonitis  #Acute cystitis  - MRSA positive  - F/u BCx and Urinary Cx. urine negative, no growth to date in blood  - F/u sputum culture. Strep/Legionella Ag both negative  - pt off pressors, BP runs low, on midodrine 10mg q8 at home  Continue Vancomycin, Cefepime (7 day course) on day 5p    #R heel ulcer, worsening  -podiatry consult  -f/u esr/crp, doppler arterial LE bilateral, xrays    #bradycardia  #Systolic Murmur 2/2 possible AS  - TTE EF 65%  -episodes of kvng, sinus rhythm  -cardio consult, f/u recs  -pt to be off tele per ICU attending    #Osteoporosis  -Continue Raloxifene    #Neuropathy  - Continue Gabapentin    #Downs Syndrome  - Non verbal, Wheelchair bound    # ? history of hypothyroidism  - not on synthroid at home  - TSH 4.1    dvt ppx  gi ppx  diet: pureed with mildly thick liquids  activity: bed bound    For Follow Up:  Cardio consult  Podiatry consult  DOWNGRADE TO FLOORS (does not need TELE per attending)

## 2022-03-29 NOTE — PROGRESS NOTE ADULT - SUBJECTIVE AND OBJECTIVE BOX
TEA ECHAVARRIA  56y  Female  afebrile, comfortable  bp better off pressors  central line d/c-ed  bradycardia resolved  cardio noted  podiatry appreciated  needs surgery- need to discuss w CAB    INTERVAL EVENTS:    T(C): 37.2 (03-29-22 @ 12:00), Max: 37.2 (03-29-22 @ 12:00)  HR: 74 (03-29-22 @ 12:00) (50 - 122)  BP: 113/59 (03-29-22 @ 12:00) (86/51 - 143/68)  RR: 21 (03-29-22 @ 12:00) (9 - 24)  SpO2: 96% (03-29-22 @ 12:00) (93% - 99%)  Wt(kg): --Vital Signs Last 24 Hrs  T(C): 37.2 (29 Mar 2022 12:00), Max: 37.2 (29 Mar 2022 12:00)  T(F): 98.9 (29 Mar 2022 12:00), Max: 98.9 (29 Mar 2022 12:00)  HR: 74 (29 Mar 2022 12:00) (50 - 122)  BP: 113/59 (29 Mar 2022 12:00) (86/51 - 143/68)  BP(mean): 87 (29 Mar 2022 12:00) (57 - 98)  RR: 21 (29 Mar 2022 12:00) (9 - 24)  SpO2: 96% (29 Mar 2022 12:00) (93% - 99%)    PHYSICAL EXAM:  GENERAL: resting comfortably  NECK: Supple, No JVD, Normal thyroid  CHEST/LUNG: Clear; No crackles or wheezing  HEART: S1, S2, Regular rate and rhythm;   ABDOMEN: Soft, Nontender, Nondistended; Bowel sounds present  EXTREMITIES: rt foot ulcer  SKIN: No rashes or lesions    LABS:                          13.9   7.64  )-----------( 237      ( 29 Mar 2022 05:30 )             40.6     03-29    142  |  104  |  9<L>  ----------------------------<  146<H>  3.8   |  27  |  0.7    Ca    8.3<L>      29 Mar 2022 05:30  Mg     1.8     03-29    TPro  5.4<L>  /  Alb  2.8<L>  /  TBili  <0.2  /  DBili  x   /  AST  13  /  ALT  9   /  AlkPhos  84  03-29              cefepime   IVPB 2000 milliGRAM(s) IV Intermittent every 8 hours  chlorhexidine 4% Liquid 1 Application(s) Topical <User Schedule>  collagenase Ointment 1 Application(s) Topical two times a day  enoxaparin Injectable 40 milliGRAM(s) SubCutaneous every 24 hours  gabapentin Solution 300 milliGRAM(s) Oral every 12 hours  hydrocortisone sodium succinate Injectable 50 milliGRAM(s) IV Push every 8 hours  melatonin 5 milliGRAM(s) Oral at bedtime  midodrine. 10 milliGRAM(s) Oral every 8 hours  multivitamin 1 Tablet(s) Oral daily  mupirocin 2% Ointment 1 Application(s) Topical two times a day  norepinephrine Infusion 0.05 MICROgram(s)/kG/Min IV Continuous <Continuous>  pantoprazole   Suspension 40 milliGRAM(s) Oral daily  raloxifene 60 milliGRAM(s) Oral daily  vancomycin  IVPB 1000 milliGRAM(s) IV Intermittent every 12 hours      RADIOLOGY & ADDITIONAL TESTS:    case discussed with the resident  Available consult notes reviewed    Assessment and plan:     may transfer out of icu  needs ID eval for antibiotics  CAB for consent  will be very hard to manage post- op due to her preferred leg positioning  arterial doppler  cardio to clear for surgery             TEA ECHAVARRIA  56y  Female  afebrile, comfortable  bp better off pressors  central line d/c-ed  bradycardia resolved  cardio noted  podiatry appreciated  needs surgery- need to discuss w CAB    INTERVAL EVENTS:    T(C): 37.2 (03-29-22 @ 12:00), Max: 37.2 (03-29-22 @ 12:00)  HR: 74 (03-29-22 @ 12:00) (50 - 122)  BP: 113/59 (03-29-22 @ 12:00) (86/51 - 143/68)  RR: 21 (03-29-22 @ 12:00) (9 - 24)  SpO2: 96% (03-29-22 @ 12:00) (93% - 99%)  Wt(kg): --Vital Signs Last 24 Hrs  T(C): 37.2 (29 Mar 2022 12:00), Max: 37.2 (29 Mar 2022 12:00)  T(F): 98.9 (29 Mar 2022 12:00), Max: 98.9 (29 Mar 2022 12:00)  HR: 74 (29 Mar 2022 12:00) (50 - 122)  BP: 113/59 (29 Mar 2022 12:00) (86/51 - 143/68)  BP(mean): 87 (29 Mar 2022 12:00) (57 - 98)  RR: 21 (29 Mar 2022 12:00) (9 - 24)  SpO2: 96% (29 Mar 2022 12:00) (93% - 99%)    PHYSICAL EXAM:  GENERAL: resting comfortably  NECK: Supple, No JVD, Normal thyroid  CHEST/LUNG: Clear; No crackles or wheezing  HEART: S1, S2, Regular rate and rhythm;   ABDOMEN: Soft, Nontender, Nondistended; Bowel sounds present  EXTREMITIES: rt foot ulcer  SKIN: No rashes or lesions    LABS:                          13.9   7.64  )-----------( 237      ( 29 Mar 2022 05:30 )             40.6     03-29    142  |  104  |  9<L>  ----------------------------<  146<H>  3.8   |  27  |  0.7    Ca    8.3<L>      29 Mar 2022 05:30  Mg     1.8     03-29    TPro  5.4<L>  /  Alb  2.8<L>  /  TBili  <0.2  /  DBili  x   /  AST  13  /  ALT  9   /  AlkPhos  84  03-29              cefepime   IVPB 2000 milliGRAM(s) IV Intermittent every 8 hours  chlorhexidine 4% Liquid 1 Application(s) Topical <User Schedule>  collagenase Ointment 1 Application(s) Topical two times a day  enoxaparin Injectable 40 milliGRAM(s) SubCutaneous every 24 hours  gabapentin Solution 300 milliGRAM(s) Oral every 12 hours  hydrocortisone sodium succinate Injectable 50 milliGRAM(s) IV Push every 8 hours  melatonin 5 milliGRAM(s) Oral at bedtime  midodrine. 10 milliGRAM(s) Oral every 8 hours  multivitamin 1 Tablet(s) Oral daily  mupirocin 2% Ointment 1 Application(s) Topical two times a day  norepinephrine Infusion 0.05 MICROgram(s)/kG/Min IV Continuous <Continuous>  pantoprazole   Suspension 40 milliGRAM(s) Oral daily  raloxifene 60 milliGRAM(s) Oral daily  vancomycin  IVPB 1000 milliGRAM(s) IV Intermittent every 12 hours      RADIOLOGY & ADDITIONAL TESTS:    case discussed with the resident  Available consult notes reviewed    Assessment and plan:     may transfer out of icu  needs ID eval for antibiotics  CAB for consent  will be very hard to manage post- op due to her preferred leg positioning  arterial doppler- good flow  cardio to clear for surgery

## 2022-03-29 NOTE — PROGRESS NOTE ADULT - SUBJECTIVE AND OBJECTIVE BOX
Podiatry Progress Note    Subjective:   TEA ECHAVARRIA is a 56y old  Female who presents with a chief complaint of Aspiration pneumonia (29 Mar 2022 10:43)  Reviewed FXR-R; podiatry sx schedule updated;     PAST MEDICAL & SURGICAL HISTORY:  Down syndrome  Osteoporosis  Mild anemia  Neuropathy  S/P debridement  of R hip on 3/2/21    Objective:  Vital Signs Last 24 Hrs  T(C): 36.2 (29 Mar 2022 04:00), Max: 36.7 (28 Mar 2022 12:00)  T(F): 97.1 (29 Mar 2022 04:00), Max: 98.1 (28 Mar 2022 12:00)  HR: 70 (29 Mar 2022 07:00) (48 - 122)  BP: 143/68 (29 Mar 2022 07:00) (82/50 - 143/68)  BP(mean): 93 (29 Mar 2022 07:00) (51 - 98)  RR: 19 (29 Mar 2022 07:00) (9 - 30)  SpO2: 99% (29 Mar 2022 07:00) (87% - 99%)                        13.9   7.64  )-----------( 237      ( 29 Mar 2022 05:30 )             40.6                 03-29    142  |  104  |  9<L>  ----------------------------<  146<H>  3.8   |  27  |  0.7    Ca    8.3<L>      29 Mar 2022 05:30  Mg     1.8     03-29    TPro  5.4<L>  /  Alb  2.8<L>  /  TBili  <0.2  /  DBili  x   /  AST  13  /  ALT  9   /  AlkPhos  84  03-29  ---------------------------------------------------------------------  ACC: [id number] EXAM: XR FOOT COMP MIN 3 VIEWS RT    PROCEDURE DATE: 03/28/2022    INTERPRETATION: CLINICAL HISTORY: Right foot ulcer.    COMPARISON: 3/19/2021.    TECHNIQUE: 3 views of the right foot.    FINDINGS:    Subluxation/hyperextension of the first MTP joint, new from 3/19/2021. Apparent erosions/irregularity at the base of the first proximal phalanx with ulcer in this region. Constellation of findings most consistent with septic arthritis/osteomyelitis.    Degenerative change of the hindfoot and midfoot with associated subchondral cysts/erosions possibly due to neuropathic arthropathy. Shortened fourth metatarsal.    IMPRESSION:    Findings consistent with septic arthritis/osteomyelitis at the first MTP joint as above.    --- End of Report ---    MARK FUNEZ MD; Attending Radiologist  This document has been electronically signed. Mar 29 2022 8:57AM  -------------------------------------------------------------------------------  Assessment:  Right Forefoot Plantar Ulcer (1st Submetatarsal head) - Probes to Bone    Plan:  Chart reviewed and Patient evaluated. All Questions and Concerns Addressed and Answered  Discussed diagnosis and treatment with patient  Wound Flushed w/ NS; Wound Dressed w/ Xeroform / DSD / Kerlix; q24  Local Wound Care; As Stated Above   FXR-R reviewed; see above report;   A-Duplex Pending result; will follow up; if any abnormality was found, request vascular consult;   Request ID consult; FXR-R shows septic arthritis/osteomyelitis at the first MTP join of right foot; much appreciated for abx regiment recommendation;   Sx scheduled Thurs 3/31 @ 1:00PM; excisional debridement of soft tissue and bone with possible 1st metatarsal head resection R foot;  Request cardio clearance;  Request pre-lab optimization and OR stratification prior to sx Thurs 3/31;   NPO @ [place] Wed 3/30;   Weight bearing status; WBAT BL feet;   Discussed plan w/ Dr. Everett;     Podiatry

## 2022-03-29 NOTE — PROGRESS NOTE ADULT - SUBJECTIVE AND OBJECTIVE BOX
Patient is a 56y old  Female who presents with a chief complaint of Aspiration pneumonia (28 Mar 2022 15:45)        Over Night Events:  No events overnight.  Off pressors.          ROS:     All ROS are negative except HPI         PHYSICAL EXAM    ICU Vital Signs Last 24 Hrs  T(C): 36.2 (29 Mar 2022 04:00), Max: 36.7 (28 Mar 2022 12:00)  T(F): 97.1 (29 Mar 2022 04:00), Max: 98.1 (28 Mar 2022 12:00)  HR: 70 (29 Mar 2022 07:00) (44 - 122)  BP: 143/68 (29 Mar 2022 07:00) (82/50 - 143/68)  BP(mean): 93 (29 Mar 2022 07:00) (51 - 98)  ABP: --  ABP(mean): --  RR: 19 (29 Mar 2022 07:00) (9 - 30)  SpO2: 99% (29 Mar 2022 07:00) (87% - 99%)      CONSTITUTIONAL:  Ill appearing in NAD    ENT:   Airway patent,   Mouth with normal mucosa.   No thrush    EYES:   Pupils equal,   Round and reactive to light.    CARDIAC:   Normal rate,   Regular rhythm.     edema      Vascular:  Normal systolic impulse  No Carotid bruits    RESPIRATORY:   No wheezing  Bilateral BS  Normal chest expansion  Not tachypneic,  No use of accessory muscles    GASTROINTESTINAL:  Abdomen soft,   Non-tender,   No guarding,   + BS    MUSCULOSKELETAL:   Range of motion is  limited,  No clubbing, cyanosis    NEUROLOGICAL:   Alert  Does not follow commands .    SKIN:   Skin normal color for race,     PSYCHIATRIC:   No apparent risk to self or others.    HEMATOLOGICAL:  No cervical  lymphadenopathy.  no inguinal lymphadenopathy      03-28-22 @ 07:01  -  03-29-22 @ 07:00  --------------------------------------------------------  IN:    IV PiggyBack: 850 mL    Norepinephrine: 10 mL    sodium chloride 0.9%: 660 mL  Total IN: 1520 mL    OUT:    Indwelling Catheter - Urethral (mL): 2000 mL  Total OUT: 2000 mL    Total NET: -480 mL          LABS:                            13.9   7.64  )-----------( 237      ( 29 Mar 2022 05:30 )             40.6                                               03-29    142  |  104  |  9<L>  ----------------------------<  146<H>  3.8   |  27  |  0.7    Ca    8.3<L>      29 Mar 2022 05:30  Mg     1.8     03-29    TPro  5.4<L>  /  Alb  2.8<L>  /  TBili  <0.2  /  DBili  x   /  AST  13  /  ALT  9   /  AlkPhos  84  03-29                                                                                           LIVER FUNCTIONS - ( 29 Mar 2022 05:30 )  Alb: 2.8 g/dL / Pro: 5.4 g/dL / ALK PHOS: 84 U/L / ALT: 9 U/L / AST: 13 U/L / GGT: x                                                                                                                                       MEDICATIONS  (STANDING):  cefepime   IVPB 2000 milliGRAM(s) IV Intermittent every 8 hours  chlorhexidine 4% Liquid 1 Application(s) Topical <User Schedule>  collagenase Ointment 1 Application(s) Topical two times a day  enoxaparin Injectable 40 milliGRAM(s) SubCutaneous every 24 hours  gabapentin Solution 300 milliGRAM(s) Oral every 12 hours  hydrocortisone sodium succinate Injectable 50 milliGRAM(s) IV Push every 8 hours  melatonin 5 milliGRAM(s) Oral at bedtime  midodrine. 10 milliGRAM(s) Oral every 8 hours  multivitamin 1 Tablet(s) Oral daily  mupirocin 2% Ointment 1 Application(s) Topical two times a day  norepinephrine Infusion 0.05 MICROgram(s)/kG/Min (6.56 mL/Hr) IV Continuous <Continuous>  pantoprazole   Suspension 40 milliGRAM(s) Oral daily  raloxifene 60 milliGRAM(s) Oral daily  sodium chloride 0.9%. 1000 milliLiter(s) (30 mL/Hr) IV Continuous <Continuous>  vancomycin  IVPB 1000 milliGRAM(s) IV Intermittent every 12 hours    MEDICATIONS  (PRN):      New X-rays reviewed:                                                                                  ECHO    CXR interpreted by me:

## 2022-03-29 NOTE — PROGRESS NOTE ADULT - ASSESSMENT
IMPRESSION:    Septic shock  CAP improving.  MRSA positive   HO Down syndrome  HO low BP on Midodrine at home     PLAN:    CNS:  Continue present management    HEENT: Oral care    PULMONARY:  HOB @ 45 degrees.  Aspiration precautions     CARDIOVASCULAR: Avoid overload      GI: GI prophylaxis.  Feeding per speech.  Bowel regimen     RENAL:  Follow up lytes.  Correct as needed    INFECTIOUS DISEASE: Follow up cultures.  Finish ABX course 2 more days.     HEMATOLOGICAL:  DVT prophylaxis.  Dimer noted.      ENDOCRINE:  Follow up FS.  TSH noted.      MUSCULOSKELETAL:  Podiatry eval appreciated.  Arterial duplex    Downgrade to floor    FLAVIO Madsen

## 2022-03-29 NOTE — PROGRESS NOTE ADULT - SUBJECTIVE AND OBJECTIVE BOX
Patient is a 56y old  Female who presents with a chief complaint of Aspiration pneumonia (29 Mar 2022 08:48)      INTERVAL HPI/OVERNIGHT EVENTS:   No overnight events. OFF PRESSORS. on ABX to complete course. OFF FLUIDS. Tolerating pureed mildly thick liquids.       ICU Vital Signs Last 24 Hrs  T(C): 36.2 (29 Mar 2022 04:00), Max: 36.7 (28 Mar 2022 12:00)  T(F): 97.1 (29 Mar 2022 04:00), Max: 98.1 (28 Mar 2022 12:00)  HR: 70 (29 Mar 2022 07:00) (48 - 122)  BP: 143/68 (29 Mar 2022 07:00) (82/50 - 143/68)  BP(mean): 93 (29 Mar 2022 07:00) (51 - 98)  ABP: --  ABP(mean): --  RR: 19 (29 Mar 2022 07:00) (9 - 30)  SpO2: 99% (29 Mar 2022 07:00) (87% - 99%)    I&O's Summary    28 Mar 2022 07:01  -  29 Mar 2022 07:00  --------------------------------------------------------  IN: 1520 mL / OUT: 2000 mL / NET: -480 mL          LABS:                        13.9   7.64  )-----------( 237      ( 29 Mar 2022 05:30 )             40.6     03-29    142  |  104  |  9<L>  ----------------------------<  146<H>  3.8   |  27  |  0.7    Ca    8.3<L>      29 Mar 2022 05:30  Mg     1.8     03-29    TPro  5.4<L>  /  Alb  2.8<L>  /  TBili  <0.2  /  DBili  x   /  AST  13  /  ALT  9   /  AlkPhos  84  03-29        CAPILLARY BLOOD GLUCOSE            RADIOLOGY & ADDITIONAL TESTS:    Consultant(s) Notes Reviewed:  [x ] YES  [ ] NO    MEDICATIONS  (STANDING):  cefepime   IVPB 2000 milliGRAM(s) IV Intermittent every 8 hours  chlorhexidine 4% Liquid 1 Application(s) Topical <User Schedule>  collagenase Ointment 1 Application(s) Topical two times a day  enoxaparin Injectable 40 milliGRAM(s) SubCutaneous every 24 hours  gabapentin Solution 300 milliGRAM(s) Oral every 12 hours  hydrocortisone sodium succinate Injectable 50 milliGRAM(s) IV Push every 8 hours  melatonin 5 milliGRAM(s) Oral at bedtime  midodrine. 10 milliGRAM(s) Oral every 8 hours  multivitamin 1 Tablet(s) Oral daily  mupirocin 2% Ointment 1 Application(s) Topical two times a day  norepinephrine Infusion 0.05 MICROgram(s)/kG/Min (6.56 mL/Hr) IV Continuous <Continuous>  pantoprazole   Suspension 40 milliGRAM(s) Oral daily  raloxifene 60 milliGRAM(s) Oral daily  vancomycin  IVPB 1000 milliGRAM(s) IV Intermittent every 12 hours    MEDICATIONS  (PRN):      PHYSICAL EXAM:  GENERAL: sleeping, does not follow commands  HEAD:  Atraumatic, Normocephalic, dysmorphic  EYES: EOMI, PERRLA, conjunctiva and sclera clear  NECK: Supple, No JVD, Normal thyroid, no enlarged nodes  NERVOUS SYSTEM:  Alert & Awake.   CHEST/LUNG: B/L good air entry; No rales, rhonchi, or wheezing  HEART: S1S2 normal, no S3, Regular rate and rhythm; No murmurs  ABDOMEN: Soft, Nontender, Nondistended; Bowel sounds present  EXTREMITIES:  2+ Peripheral Pulses, No clubbing, cyanosis, or edema  LYMPH: No lymphadenopathy noted  SKIN: No rashes or lesions    Care Discussed with Consultants/Other Providers [ x] YES  [ ] NO

## 2022-03-30 LAB
-  AMPICILLIN/SULBACTAM: SIGNIFICANT CHANGE UP
-  AMPICILLIN/SULBACTAM: SIGNIFICANT CHANGE UP
-  CEFAZOLIN: SIGNIFICANT CHANGE UP
-  CEFAZOLIN: SIGNIFICANT CHANGE UP
-  CLINDAMYCIN: SIGNIFICANT CHANGE UP
-  CLINDAMYCIN: SIGNIFICANT CHANGE UP
-  ERYTHROMYCIN: SIGNIFICANT CHANGE UP
-  ERYTHROMYCIN: SIGNIFICANT CHANGE UP
-  GENTAMICIN: SIGNIFICANT CHANGE UP
-  GENTAMICIN: SIGNIFICANT CHANGE UP
-  OXACILLIN: SIGNIFICANT CHANGE UP
-  OXACILLIN: SIGNIFICANT CHANGE UP
-  PENICILLIN: SIGNIFICANT CHANGE UP
-  PENICILLIN: SIGNIFICANT CHANGE UP
-  RIFAMPIN: SIGNIFICANT CHANGE UP
-  RIFAMPIN: SIGNIFICANT CHANGE UP
-  TETRACYCLINE: SIGNIFICANT CHANGE UP
-  TETRACYCLINE: SIGNIFICANT CHANGE UP
-  TRIMETHOPRIM/SULFAMETHOXAZOLE: SIGNIFICANT CHANGE UP
-  TRIMETHOPRIM/SULFAMETHOXAZOLE: SIGNIFICANT CHANGE UP
-  VANCOMYCIN: SIGNIFICANT CHANGE UP
-  VANCOMYCIN: SIGNIFICANT CHANGE UP
ALBUMIN SERPL ELPH-MCNC: 3.1 G/DL — LOW (ref 3.5–5.2)
ALP SERPL-CCNC: 88 U/L — SIGNIFICANT CHANGE UP (ref 30–115)
ALT FLD-CCNC: 13 U/L — SIGNIFICANT CHANGE UP (ref 0–41)
ANION GAP SERPL CALC-SCNC: 10 MMOL/L — SIGNIFICANT CHANGE UP (ref 7–14)
APTT BLD: 27.4 SEC — SIGNIFICANT CHANGE UP (ref 27–39.2)
AST SERPL-CCNC: 18 U/L — SIGNIFICANT CHANGE UP (ref 0–41)
BASOPHILS # BLD AUTO: 0.03 K/UL — SIGNIFICANT CHANGE UP (ref 0–0.2)
BASOPHILS NFR BLD AUTO: 0.4 % — SIGNIFICANT CHANGE UP (ref 0–1)
BILIRUB SERPL-MCNC: <0.2 MG/DL — SIGNIFICANT CHANGE UP (ref 0.2–1.2)
BLD GP AB SCN SERPL QL: SIGNIFICANT CHANGE UP
BUN SERPL-MCNC: 10 MG/DL — SIGNIFICANT CHANGE UP (ref 10–20)
CALCIUM SERPL-MCNC: 8.6 MG/DL — SIGNIFICANT CHANGE UP (ref 8.5–10.1)
CHLORIDE SERPL-SCNC: 104 MMOL/L — SIGNIFICANT CHANGE UP (ref 98–110)
CO2 SERPL-SCNC: 30 MMOL/L — SIGNIFICANT CHANGE UP (ref 17–32)
CREAT SERPL-MCNC: 0.7 MG/DL — SIGNIFICANT CHANGE UP (ref 0.7–1.5)
CRP SERPL-MCNC: 16 MG/L — HIGH
EGFR: 102 ML/MIN/1.73M2 — SIGNIFICANT CHANGE UP
EOSINOPHIL # BLD AUTO: 0.02 K/UL — SIGNIFICANT CHANGE UP (ref 0–0.7)
EOSINOPHIL NFR BLD AUTO: 0.3 % — SIGNIFICANT CHANGE UP (ref 0–8)
GLUCOSE SERPL-MCNC: 164 MG/DL — HIGH (ref 70–99)
HCT VFR BLD CALC: 43 % — SIGNIFICANT CHANGE UP (ref 37–47)
HGB BLD-MCNC: 14.7 G/DL — SIGNIFICANT CHANGE UP (ref 12–16)
IMM GRANULOCYTES NFR BLD AUTO: 0.1 % — SIGNIFICANT CHANGE UP (ref 0.1–0.3)
INR BLD: 1.07 RATIO — SIGNIFICANT CHANGE UP (ref 0.65–1.3)
INR BLD: 1.07 RATIO — SIGNIFICANT CHANGE UP (ref 0.65–1.3)
LYMPHOCYTES # BLD AUTO: 1.27 K/UL — SIGNIFICANT CHANGE UP (ref 1.2–3.4)
LYMPHOCYTES # BLD AUTO: 18.2 % — LOW (ref 20.5–51.1)
MAGNESIUM SERPL-MCNC: 2.1 MG/DL — SIGNIFICANT CHANGE UP (ref 1.8–2.4)
MCHC RBC-ENTMCNC: 32.6 PG — HIGH (ref 27–31)
MCHC RBC-ENTMCNC: 34.2 G/DL — SIGNIFICANT CHANGE UP (ref 32–37)
MCV RBC AUTO: 95.3 FL — SIGNIFICANT CHANGE UP (ref 81–99)
METHOD TYPE: SIGNIFICANT CHANGE UP
METHOD TYPE: SIGNIFICANT CHANGE UP
MONOCYTES # BLD AUTO: 0.25 K/UL — SIGNIFICANT CHANGE UP (ref 0.1–0.6)
MONOCYTES NFR BLD AUTO: 3.6 % — SIGNIFICANT CHANGE UP (ref 1.7–9.3)
NEUTROPHILS # BLD AUTO: 5.38 K/UL — SIGNIFICANT CHANGE UP (ref 1.4–6.5)
NEUTROPHILS NFR BLD AUTO: 77.4 % — HIGH (ref 42.2–75.2)
NRBC # BLD: 0 /100 WBCS — SIGNIFICANT CHANGE UP (ref 0–0)
PLATELET # BLD AUTO: 273 K/UL — SIGNIFICANT CHANGE UP (ref 130–400)
POTASSIUM SERPL-MCNC: 4.3 MMOL/L — SIGNIFICANT CHANGE UP (ref 3.5–5)
POTASSIUM SERPL-SCNC: 4.3 MMOL/L — SIGNIFICANT CHANGE UP (ref 3.5–5)
PROT SERPL-MCNC: 6 G/DL — SIGNIFICANT CHANGE UP (ref 6–8)
PROTHROM AB SERPL-ACNC: 12.3 SEC — SIGNIFICANT CHANGE UP (ref 9.95–12.87)
PROTHROM AB SERPL-ACNC: 12.3 SEC — SIGNIFICANT CHANGE UP (ref 9.95–12.87)
RBC # BLD: 4.51 M/UL — SIGNIFICANT CHANGE UP (ref 4.2–5.4)
RBC # FLD: 13.9 % — SIGNIFICANT CHANGE UP (ref 11.5–14.5)
SODIUM SERPL-SCNC: 144 MMOL/L — SIGNIFICANT CHANGE UP (ref 135–146)
WBC # BLD: 6.96 K/UL — SIGNIFICANT CHANGE UP (ref 4.8–10.8)
WBC # FLD AUTO: 6.96 K/UL — SIGNIFICANT CHANGE UP (ref 4.8–10.8)

## 2022-03-30 PROCEDURE — 99232 SBSQ HOSP IP/OBS MODERATE 35: CPT

## 2022-03-30 PROCEDURE — 99233 SBSQ HOSP IP/OBS HIGH 50: CPT

## 2022-03-30 RX ADMIN — CEFEPIME 100 MILLIGRAM(S): 1 INJECTION, POWDER, FOR SOLUTION INTRAMUSCULAR; INTRAVENOUS at 13:00

## 2022-03-30 RX ADMIN — Medication 250 MILLIGRAM(S): at 17:38

## 2022-03-30 RX ADMIN — PANTOPRAZOLE SODIUM 40 MILLIGRAM(S): 20 TABLET, DELAYED RELEASE ORAL at 12:54

## 2022-03-30 RX ADMIN — Medication 1 APPLICATION(S): at 17:38

## 2022-03-30 RX ADMIN — CHLORHEXIDINE GLUCONATE 1 APPLICATION(S): 213 SOLUTION TOPICAL at 05:12

## 2022-03-30 RX ADMIN — CEFEPIME 100 MILLIGRAM(S): 1 INJECTION, POWDER, FOR SOLUTION INTRAMUSCULAR; INTRAVENOUS at 05:12

## 2022-03-30 RX ADMIN — CEFEPIME 100 MILLIGRAM(S): 1 INJECTION, POWDER, FOR SOLUTION INTRAMUSCULAR; INTRAVENOUS at 21:24

## 2022-03-30 RX ADMIN — MUPIROCIN 1 APPLICATION(S): 20 OINTMENT TOPICAL at 18:54

## 2022-03-30 RX ADMIN — RALOXIFENE HYDROCHLORIDE 60 MILLIGRAM(S): 60 TABLET, COATED ORAL at 12:53

## 2022-03-30 RX ADMIN — MIDODRINE HYDROCHLORIDE 10 MILLIGRAM(S): 2.5 TABLET ORAL at 13:00

## 2022-03-30 RX ADMIN — Medication 50 MILLIGRAM(S): at 05:10

## 2022-03-30 RX ADMIN — Medication 1 APPLICATION(S): at 06:50

## 2022-03-30 RX ADMIN — GABAPENTIN 300 MILLIGRAM(S): 400 CAPSULE ORAL at 17:39

## 2022-03-30 RX ADMIN — Medication 1 TABLET(S): at 12:54

## 2022-03-30 RX ADMIN — ENOXAPARIN SODIUM 40 MILLIGRAM(S): 100 INJECTION SUBCUTANEOUS at 23:24

## 2022-03-30 RX ADMIN — MIDODRINE HYDROCHLORIDE 10 MILLIGRAM(S): 2.5 TABLET ORAL at 21:23

## 2022-03-30 RX ADMIN — Medication 50 MILLIGRAM(S): at 13:01

## 2022-03-30 RX ADMIN — Medication 5 MILLIGRAM(S): at 21:23

## 2022-03-30 RX ADMIN — MIDODRINE HYDROCHLORIDE 10 MILLIGRAM(S): 2.5 TABLET ORAL at 05:10

## 2022-03-30 RX ADMIN — MUPIROCIN 1 APPLICATION(S): 20 OINTMENT TOPICAL at 05:13

## 2022-03-30 RX ADMIN — Medication 50 MILLIGRAM(S): at 21:23

## 2022-03-30 RX ADMIN — GABAPENTIN 300 MILLIGRAM(S): 400 CAPSULE ORAL at 07:01

## 2022-03-30 RX ADMIN — Medication 250 MILLIGRAM(S): at 05:12

## 2022-03-30 RX ADMIN — ENOXAPARIN SODIUM 40 MILLIGRAM(S): 100 INJECTION SUBCUTANEOUS at 00:57

## 2022-03-30 NOTE — PROGRESS NOTE ADULT - ASSESSMENT
* SUMMARY:    * Patient-based characteristics (Functional capacity)  Patient is able to achieve more than 4 MET (walk 4 blocks, climb 2 flights of stairs, etc...)          Y [] / N [X]    High-risk patient features:  - Recent (<30 days) or active MI          Y [] / N [X]  - Unstable or severe angina          Y [] / N [X]  - Decompensated heart failure, or worsening or new-onset heart failure          Y [] / N [X]  - Severe valvular disease          Y [] / N [X]  - Significant arrhythmia (Tachy- or Bradyarrhythmia)          Y [X] / N []    * Surgery/Procedure-based characteristics (Type of surgery)  - Low-risk procedure (outpatient procedure, elective, endoscopy, etc...)          Y [X] / N []  - Elevated or Moderate-risk procedure (Inpatient)          Y [] / N []  - High-risk procedure (urgent/emergent procedure, Intrathoracic, vascular, etc...)          Y [] / N []    * Revised Cardiac Risk Index (RCRI)  1- History of ischemic heart disease          Y [] / N [X]  2- History of congestive heart failure          Y [] / N [X]  3- History of stroke/TIA          Y [] / N [X]  4- History of insulin-dependent diabetes          Y [] / N [X]  5- Chronic kidney disease (Cr >2mg/dL)          Y [] / N [X]  6- Undergoing suprainguinal vascular, intraperitoneal, or intrathoracic surgery          Y [] / N [X]    Class I risk (Zero factors) --> 3.9% (30-day risk of death, MI, or cardiac arrest)    * IMPRESSION & RECOMMENDATIONS:  Moderate -risk patient for a Moderate -risk surgery/procedure  No further cardiac work-up is needed at the moment. There are no current cardiac contraindications to prevent from proceeding with the scheduled surgery/procedure.    - This consult serves only as a adama-operative cardiac risk stratification and evaluation to predict 30-days cardiac complications risk and mortality. The decision to proceed with the surgery/procedure is made by the performing physician and the patient -

## 2022-03-30 NOTE — PROGRESS NOTE ADULT - SUBJECTIVE AND OBJECTIVE BOX
TEA ECHAVARRIA  56y  Female    afebrile  heart rate and bp stable  no acute issues  INTERVAL EVENTS:    T(C): 36.4 (03-30-22 @ 04:00), Max: 37.2 (03-29-22 @ 12:00)  HR: 64 (03-30-22 @ 08:00) (56 - 96)  BP: 141/66 (03-30-22 @ 08:00) (91/45 - 141/66)  RR: 16 (03-30-22 @ 08:00) (11 - 25)  SpO2: 98% (03-30-22 @ 08:00) (92% - 99%)  Wt(kg): --Vital Signs Last 24 Hrs  T(C): 36.4 (30 Mar 2022 04:00), Max: 37.2 (29 Mar 2022 12:00)  T(F): 97.6 (30 Mar 2022 04:00), Max: 99 (29 Mar 2022 17:40)  HR: 64 (30 Mar 2022 08:00) (56 - 96)  BP: 141/66 (30 Mar 2022 08:00) (91/45 - 141/66)  BP(mean): 91 (30 Mar 2022 08:00) (64 - 98)  RR: 16 (30 Mar 2022 08:00) (11 - 25)  SpO2: 98% (30 Mar 2022 08:00) (92% - 99%)    PHYSICAL EXAM:  GENERAL: resting comfortably  NECK: Supple, No JVD, Normal thyroid  CHEST/LUNG: Clear; No crackles or wheezing  HEART: S1, S2, Regular rate and rhythm;   ABDOMEN: Soft, Nontender, Nondistended; Bowel sounds present  EXTREMITIES: chronic changes  SKIN: rt foot ulcer    LABS:                          14.7   6.96  )-----------( 273      ( 30 Mar 2022 05:00 )             43.0     03-30    144  |  104  |  10  ----------------------------<  164<H>  4.3   |  30  |  0.7    Ca    8.6      30 Mar 2022 05:00  Mg     2.1     03-30    TPro  6.0  /  Alb  3.1<L>  /  TBili  <0.2  /  DBili  x   /  AST  18  /  ALT  13  /  AlkPhos  88  03-30          Culture - Abscess with Gram Stain (collected 28 Mar 2022 17:23)  Source: .Abscess R foot wound  Preliminary Report (29 Mar 2022 20:11):    Rare Staphylococcus aureus    Few Staphylococcus lugdunensis          cefepime   IVPB 2000 milliGRAM(s) IV Intermittent every 8 hours  chlorhexidine 4% Liquid 1 Application(s) Topical <User Schedule>  collagenase Ointment 1 Application(s) Topical two times a day  enoxaparin Injectable 40 milliGRAM(s) SubCutaneous every 24 hours  gabapentin Solution 300 milliGRAM(s) Oral every 12 hours  hydrocortisone sodium succinate Injectable 50 milliGRAM(s) IV Push every 8 hours  melatonin 5 milliGRAM(s) Oral at bedtime  midodrine. 10 milliGRAM(s) Oral every 8 hours  multivitamin 1 Tablet(s) Oral daily  mupirocin 2% Ointment 1 Application(s) Topical two times a day  pantoprazole   Suspension 40 milliGRAM(s) Oral daily  raloxifene 60 milliGRAM(s) Oral daily  vancomycin  IVPB 1000 milliGRAM(s) IV Intermittent every 12 hours      RADIOLOGY & ADDITIONAL TESTS:    case discussed with the resident  Available consult notes reviewed    Assessment and plan:   discussed w podiatry Dr Everett and CAB rep Leander Rich  consent will be provided by [facility]- [phone]  cardio to comment on surgical risk  get pt, inr  npo after mn  may transfer out of icu

## 2022-03-30 NOTE — PROGRESS NOTE ADULT - SUBJECTIVE AND OBJECTIVE BOX
Patient is a 56y old  Female who presents with a chief complaint of Aspiration pneumonia (30 Mar 2022 09:09)      INTERVAL HPI/OVERNIGHT EVENTS:   No overnight events   Afebrile, hemodynamically stable off pressors off o2 no gtt stable for downgrade    ICU Vital Signs Last 24 Hrs  T(C): 36.4 (30 Mar 2022 12:00), Max: 37.2 (29 Mar 2022 17:40)  T(F): 97.5 (30 Mar 2022 12:00), Max: 99 (29 Mar 2022 17:40)  HR: 77 (30 Mar 2022 12:00) (52 - 84)  BP: 93/51 (30 Mar 2022 12:00) (91/45 - 141/66)  BP(mean): 75 (30 Mar 2022 10:00) (64 - 98)  ABP: --  ABP(mean): --  RR: 16 (30 Mar 2022 12:00) (10 - 25)  SpO2: 98% (30 Mar 2022 12:00) (92% - 99%)    I&O's Summary    29 Mar 2022 07:01  -  30 Mar 2022 07:00  --------------------------------------------------------  IN: 1080 mL / OUT: 1600 mL / NET: -520 mL    30 Mar 2022 07:01  -  30 Mar 2022 14:54  --------------------------------------------------------  IN: 240 mL / OUT: 500 mL / NET: -260 mL          LABS:                        14.7   6.96  )-----------( 273      ( 30 Mar 2022 05:00 )             43.0     03-30    144  |  104  |  10  ----------------------------<  164<H>  4.3   |  30  |  0.7    Ca    8.6      30 Mar 2022 05:00  Mg     2.1     03-30    TPro  6.0  /  Alb  3.1<L>  /  TBili  <0.2  /  DBili  x   /  AST  18  /  ALT  13  /  AlkPhos  88  03-30        CAPILLARY BLOOD GLUCOSE            RADIOLOGY & ADDITIONAL TESTS:    Consultant(s) Notes Reviewed:  [x ] YES  [ ] NO    MEDICATIONS  (STANDING):  cefepime   IVPB 2000 milliGRAM(s) IV Intermittent every 8 hours  chlorhexidine 4% Liquid 1 Application(s) Topical <User Schedule>  collagenase Ointment 1 Application(s) Topical two times a day  enoxaparin Injectable 40 milliGRAM(s) SubCutaneous every 24 hours  gabapentin Solution 300 milliGRAM(s) Oral every 12 hours  hydrocortisone sodium succinate Injectable 50 milliGRAM(s) IV Push every 8 hours  melatonin 5 milliGRAM(s) Oral at bedtime  midodrine. 10 milliGRAM(s) Oral every 8 hours  multivitamin 1 Tablet(s) Oral daily  mupirocin 2% Ointment 1 Application(s) Topical two times a day  pantoprazole   Suspension 40 milliGRAM(s) Oral daily  raloxifene 60 milliGRAM(s) Oral daily  vancomycin  IVPB 1000 milliGRAM(s) IV Intermittent every 12 hours    MEDICATIONS  (PRN):      PHYSICAL EXAM:  GENERAL: nad, nonverbal  HEAD:  Atraumatic, Normocephalic  EYES: EOMI, PERRLA, conjunctiva and sclera clear  NECK: Supple, No JVD, Normal thyroid, no enlarged nodes  NERVOUS SYSTEM:  Alert & Awake. follows simple commands  CHEST/LUNG: B/L good air entry; slight wheeze  HEART: S1S2 normal, no S3, Regular rate and rhythm; No murmurs  ABDOMEN: Soft, Nontender, Nondistended; Bowel sounds present  EXTREMITIES:  no edema  LYMPH: No lymphadenopathy noted  SKIN: mult ulcers, R LE, L LE

## 2022-03-30 NOTE — PROGRESS NOTE ADULT - SUBJECTIVE AND OBJECTIVE BOX
HPI:  55y/o female with a pmhx of down syndrome, non-verbal, hypothyroidism, osteoporosis coming from [facility] here for eval multiple episodes of vomiting that began today. Pt was also found to be hypoxic and febrile. History was obtained from aid at bedside. This morning patient "vomited a lot", NBNB, then was found to be hypotensive and hypoxic to 86% on RA. Patient had been acting her usual self until then with no indication of distress. At baseline she sits in wheelchair, is nonverbal, and does not communicate in any way.    ED course: T 101, , BP min 71/36, 94% on 4L. UA positive. Initial lactate 2.2. CT showed bilateral interstitial pulmonary thickening, which may reflect aspiration pneumonitis, and bladder wall thickening.  She received azithromycin, ceftriaxone and 2.5L LR. Lactate improved to 1.8 but BP did not, so she was started on levophed.      Family: (foster mother): Ana Watersar, [phone]  Consumer advisory board: Amy Rich, [phone]    SUBJECTIVE:  Pt seen and examined at bedside. Pt is non-verbal. Asked to see pt for pre-op clearance for debridement    PAST MEDICAL & SURGICAL HISTORY  Down syndrome    Osteoporosis    Mild anemia    Neuropathy    S/P debridement  of R hip on 3/2/21        ALLERGIES:  No Known Allergies      MEDICATIONS:  MEDICATIONS  (STANDING):  cefepime   IVPB 2000 milliGRAM(s) IV Intermittent every 8 hours  chlorhexidine 4% Liquid 1 Application(s) Topical <User Schedule>  collagenase Ointment 1 Application(s) Topical two times a day  enoxaparin Injectable 40 milliGRAM(s) SubCutaneous every 24 hours  gabapentin Solution 300 milliGRAM(s) Oral every 12 hours  hydrocortisone sodium succinate Injectable 50 milliGRAM(s) IV Push every 8 hours  melatonin 5 milliGRAM(s) Oral at bedtime  midodrine. 10 milliGRAM(s) Oral every 8 hours  multivitamin 1 Tablet(s) Oral daily  mupirocin 2% Ointment 1 Application(s) Topical two times a day  pantoprazole   Suspension 40 milliGRAM(s) Oral daily  raloxifene 60 milliGRAM(s) Oral daily  vancomycin  IVPB 1000 milliGRAM(s) IV Intermittent every 12 hours    MEDICATIONS  (PRN):      HOME MEDICATIONS:  Home Medications:  ascorbic acid 500 mg oral tablet: 1 tab(s) orally once a day (29 Mar 2021 09:58)  collagenase 250 units/g topical ointment: 1 application topically 2 times a day (29 Mar 2021 09:58)  Ferrex-150 oral capsule: 1 cap(s) orally once a day (24 Mar 2022 20:45)  gabapentin 300 mg oral capsule: 1 cap(s) orally 2 times a day (24 Mar 2022 20:43)  melatonin 5 mg oral tablet: 1 tab(s) orally once a day (at bedtime) (19 Mar 2021 10:22)  midodrine 10 mg oral tablet: 1 tab(s) orally 3 times a day (29 Mar 2021 09:58)  Multiple Vitamins oral tablet: 1 tab(s) orally once a day (29 Mar 2021 09:58)  raloxifene 60 mg oral tablet: 1 tab(s) orally once a day (19 Mar 2021 10:22)  sodium hypochlorite 0.25% topical solution: 1 application topically 2 times a day (29 Mar 2021 09:58)  tiZANidine 2 mg oral tablet: 1 tab(s) orally 2 times a day (24 Mar 2022 20:42)  Vitamin D3 2000 intl units (50 mcg) oral capsule: 1 tab(s) orally once a day with meal (19 Mar 2021 10:22)      VITALS:   T(F): 97.5 (03-30 @ 12:00), Max: 99 (03-29 @ 17:40)  HR: 77 (03-30 @ 12:00) (36 - 122)  BP: 93/51 (03-30 @ 12:00) (75/64 - 153/82)  BP(mean): 75 (03-30 @ 10:00) (51 - 141)  RR: 16 (03-30 @ 12:00) (8 - 32)  SpO2: 98% (03-30 @ 12:00) (87% - 100%)    I&O's Summary    29 Mar 2022 07:01  -  30 Mar 2022 07:00  --------------------------------------------------------  IN: 1080 mL / OUT: 1600 mL / NET: -520 mL    30 Mar 2022 07:01  -  30 Mar 2022 16:16  --------------------------------------------------------  IN: 240 mL / OUT: 500 mL / NET: -260 mL          PHYSICAL EXAM:  NEURO: patient is awake  GEN: Not in acute distress  NECK: no thyroid enlargement, no JVD  LUNGS: Clear to auscultation bilaterally   CARDIOVASCULAR: S1/S2 present, RRR , systolic murmur  ABD: Soft, non-tender, non-distended  EXT: No NANCY    LABS:                        14.7   6.96  )-----------( 273      ( 30 Mar 2022 05:00 )             43.0     03-30    144  |  104  |  10  ----------------------------<  164<H>  4.3   |  30  |  0.7    Ca    8.6      30 Mar 2022 05:00  Mg     2.1     03-30    TPro  6.0  /  Alb  3.1<L>  /  TBili  <0.2  /  DBili  x   /  AST  18  /  ALT  13  /  AlkPhos  88  03-30              Troponin trend:            RADIOLOGY:  -CXR:  < from: Xray Chest 1 View- PORTABLE-Routine (Xray Chest 1 View- PORTABLE-Routine in AM.) (03.28.22 @ 06:19) >  Limited study due to patient rotation.    Persistent left-sided opacities.    Previously seen right IJ line, now appears over midline, presumably   related to patient rotation. Attention on follow-up.    < end of copied text >    -TTE:  < from: TTE Echo Complete w/o Contrast w/ Doppler (03.25.22 @ 09:54) >   1. Normal global left ventricular systolic function.   2. LV Ejection Fraction by Clemens's Method with a biplane EF of 65 %.   3. Normal left ventricular internal cavity size.   4. Normal left atrial size.   5. Normal right atrial size.   6. Noevidence of mitral valve regurgitation.   7. Mild tricuspid regurgitation.   8. LA volume Index is 17.6 ml/m² ml/m2.   9. Peak transaortic gradient equals 6.2 mmHg, mean transaortic gradient   equals 3.2 mmHg, the calculated aortic valve area equals 2.09 cm² by the   continuity equation consistent with mild aortic stenosis.    < end of copied text >    -CCTA:  -STRESS TEST:  -CATHETERIZATION:    ECG:  sinus tachycardia     TELEMETRY EVENTS:

## 2022-03-30 NOTE — CHART NOTE - NSCHARTNOTEFT_GEN_A_CORE
Podiatry;    Podiatry sx schedule changed due to necessity of sending paperwork for sx approval;  Left voicemail to case management; much appreciated if pt's Labs and right foot xray report can be send to fax number [phone], ideally later today or early tomorrow for sx approval;   Sx was initially scheduled Thurs 3/31 @ 1:00PM; excisional debridement of soft tissue and bone with possible 1st metatarsal head resection of right foot;   Sx rescheduled to Fri 4/1 @ 1:00PM; excisional debridement of soft tissue and bone with possible 1st metatarsal head resection of right foot;   Will f/u w/ Cardio clearance;  Request pre-lab optimization and OR stratification prior to sx on Fri 4/1;   NPO @ MN Thurs 3/31;   Please discontinue NPO MN Wed 3/30;     Podiatry [phone]

## 2022-03-30 NOTE — PROGRESS NOTE ADULT - SUBJECTIVE AND OBJECTIVE BOX
Podiatry Progress Note    Subjective:   TEA ECHAVARRIA is a pleasant well-nourished, well developed 56y Female in no acute distress, alert awake, and not oriented to person, place and time.  Patient is a 56y old  Female who presents with a chief complaint of Aspiration pneumonia (29 Mar 2022 16:30). Pt non verbal at bedside. No new complaints per nursing.      PAST MEDICAL & SURGICAL HISTORY:  Down syndrome    Osteoporosis    Mild anemia    Neuropathy    S/P debridement  of R hip on 3/2/21         Objective:  Vital Signs Last 24 Hrs  T(C): 36.4 (30 Mar 2022 04:00), Max: 37.2 (29 Mar 2022 12:00)  T(F): 97.6 (30 Mar 2022 04:00), Max: 99 (29 Mar 2022 17:40)  HR: 84 (30 Mar 2022 04:00) (50 - 96)  BP: 119/62 (30 Mar 2022 04:00) (91/45 - 127/70)  BP(mean): 85 (30 Mar 2022 04:00) (64 - 98)  RR: 18 (30 Mar 2022 04:00) (10 - 25)  SpO2: 96% (30 Mar 2022 04:00) (92% - 97%)                        14.7   6.96  )-----------( 273      ( 30 Mar 2022 05:00 )             43.0                 03-30    144  |  104  |  10  ----------------------------<  164<H>  4.3   |  30  |  0.7    Ca    8.6      30 Mar 2022 05:00  Mg     2.1     03-30    TPro  6.0  /  Alb  3.1<L>  /  TBili  <0.2  /  DBili  x   /  AST  18  /  ALT  13  /  AlkPhos  88  03-30    --------------      ACC: [id number] EXAM: DUPLEX LOW ARTERIES COMP BILAT  PROCEDURE DATE: 03/29/2022  INTERPRETATION: Clinical History / Reason for exam: 56 years old female for evaluation of arterial circulation of the lower extremities.    Right:  Patent common femoral, SFA, popliteal, anterior tibial, posterior tibial arteries.    Left:  Patent common femoral, SFA, popliteal, posterior tibial, anterior tibial arteries.    Impression:  Normal arterial flow in the visualized arteries of bilateral lower extremities.  Bilateral peroneal arteries were not visualized.  Difficult study due to patient positioning.    --- End of Report ---    LIZABETH CONNELL MD; Attending Vascular Surgeon  This document has been electronically signed. Mar 29 2022 2:17PM    -------------    Physical Exam - Lower Extremity Focused:   #Right Lower Extremity  Derm:   Open Right Plantar Pressure Ulcer @ 1st Submetatarsal;  Wound Base Fibrogranular (50% Fibrous, 50% Granular); Probes to Bone w/ Minimal Serosanguinous Drainage;  Toes are warm, capillary refill is instant to the toes;  Mild Erythema and Skin Desquamation to Periwound;     Vascular: DP and PT Pulses Palpable; Foot is Warm to Warm to the touch   Neuro: Not assessable 2/2 Mental Status  MSK: Not assessable 2/2 Mental Status     --------------------------------------------------------------------------  Assessment:  Right Forefoot Plantar Ulcer (1st Submetatarsal head) - Probes to Bone    Plan:  Chart reviewed and Patient evaluated. All Questions and Concerns Addressed and Answered  Discussed diagnosis and treatment with patient  Wound Flushed w/ NS; Wound Dressed w/ Xeroform / DSD / Kerlix; q24  Local Wound Care; As Stated Above   A-Duplex B/L; See Report Above;  Request ID consult; FXR-R shows septic arthritis/osteomyelitis at the first MTP join of right foot; much appreciated for abx regiment recommendation;   Sx scheduled Thurs 3/31 @ 1:00PM; excisional debridement of soft tissue and bone with possible 1st metatarsal head resection R foot;  Request cardio clearance;  Request pre-lab optimization and OR stratification prior to sx Thurs 3/31;   NPO @ MN Wed 3/30;   Weight bearing status; WBAT BL feet;   Discussed plan w/ Dr. Everett;     Podiatry [phone]

## 2022-03-30 NOTE — PROGRESS NOTE ADULT - ASSESSMENT
57y/o female with a pmhx of down syndrome, non-verbal, hypothyroidism, osteoporosis presents after multiple episodes of vomiting admitted for septic shock.    #Septic shock on pressors POA (resolved)  #Acute hypoxic respiratory failure  #Aspiration pneumonia/pneumonitis  #Acute cystitis  - MRSA positive  - F/u BCx and Urinary Cx. urine negative, no growth to date in blood  - F/u sputum culture. Strep/Legionella Ag both negative  - pt off pressors, BP runs low, on midodrine 10mg q8 at home  Continue Vancomycin, Cefepime (7 day course) on day 6    #R heel ulcer, worsening  -podiatry consult  -f/u esr/crp, doppler arterial LE bilateral, xrays    #bradycardia  #Systolic Murmur 2/2 possible AS  - TTE EF 65%  -episodes of kvng, sinus rhythm  -cardio consult, f/u recs  -pt ok to be off tele per ICU attending    #Osteoporosis  -Continue Raloxifene    #Neuropathy  - Continue Gabapentin    #Downs Syndrome  - Non verbal, Wheelchair bound    # ? history of hypothyroidism  - not on synthroid at home  - TSH 4.1    dvt ppx  gi ppx  diet: pureed with mildly thick liquids  activity: bed bound    For Follow Up:  Cardio consult/ cardio clearance  Podiatry consult  debridement tomorrow

## 2022-03-30 NOTE — PROGRESS NOTE ADULT - ASSESSMENT
IMPRESSION:    Septic shock resolved   CAP improving.  MRSA positive   HO Down syndrome  HO low BP on Midodrine at home   Possible OM    PLAN:    CNS:  Continue present management    HEENT: Oral care    PULMONARY:  HOB @ 45 degrees.  Aspiration precautions     CARDIOVASCULAR: Avoid overload      GI: GI prophylaxis.  Feeding per speech.  Bowel regimen     RENAL:  Follow up lytes.  Correct as needed    INFECTIOUS DISEASE: Follow up cultures.  Finish ABX course. Might need prolonged ABX for OM     HEMATOLOGICAL:  DVT prophylaxis.     ENDOCRINE:  Follow up FS.  TSH noted.      MUSCULOSKELETAL:  Podiatry following.  Arterial duplex noted     Downgrade to floor

## 2022-03-30 NOTE — PROGRESS NOTE ADULT - SUBJECTIVE AND OBJECTIVE BOX
Patient is a 56y old  Female who presents with a chief complaint of Aspiration pneumonia (30 Mar 2022 07:17)        Over Night Events:  Off pressors.  On RA.          ROS:     All ROS are negative except HPI         PHYSICAL EXAM    ICU Vital Signs Last 24 Hrs  T(C): 36.4 (30 Mar 2022 04:00), Max: 37.2 (29 Mar 2022 12:00)  T(F): 97.6 (30 Mar 2022 04:00), Max: 99 (29 Mar 2022 17:40)  HR: 62 (30 Mar 2022 07:00) (56 - 96)  BP: 129/60 (30 Mar 2022 06:00) (91/45 - 129/60)  BP(mean): 85 (30 Mar 2022 06:00) (64 - 98)  ABP: --  ABP(mean): --  RR: 24 (30 Mar 2022 07:00) (10 - 25)  SpO2: 98% (30 Mar 2022 07:00) (92% - 99%)      CONSTITUTIONAL:  In NAD    ENT:   Airway patent,   Mouth with normal mucosa.   No thrush    EYES:   Pupils equal,   Round and reactive to light.    CARDIAC:   Normal rate,   Regular rhythm.    No edema      Vascular:  Normal systolic impulse  No Carotid bruits    RESPIRATORY:   No wheezing  Bilateral BS  Normal chest expansion  Not tachypneic,  No use of accessory muscles    GASTROINTESTINAL:  Abdomen soft,   Non-tender,   No guarding,   + BS    MUSCULOSKELETAL:   Range of motion is limited,  No clubbing, cyanosis    NEUROLOGICAL:   Alert   Does not following commands       SKIN:   Skin normal color for race,   No evidence of rash.    PSYCHIATRIC:   No apparent risk to self or others.    HEMATOLOGICAL:  No cervical  lymphadenopathy.  no inguinal lymphadenopathy      03-29-22 @ 07:01  -  03-30-22 @ 07:00  --------------------------------------------------------  IN:    IV PiggyBack: 500 mL    Oral Fluid: 580 mL  Total IN: 1080 mL    OUT:    Indwelling Catheter - Urethral (mL): 500 mL    Voided (mL): 1100 mL  Total OUT: 1600 mL    Total NET: -520 mL          LABS:                            14.7   6.96  )-----------( 273      ( 30 Mar 2022 05:00 )             43.0                                               03-30    144  |  104  |  10  ----------------------------<  164<H>  4.3   |  30  |  0.7    Ca    8.6      30 Mar 2022 05:00  Mg     2.1     03-30    TPro  6.0  /  Alb  3.1<L>  /  TBili  <0.2  /  DBili  x   /  AST  18  /  ALT  13  /  AlkPhos  88  03-30                                                                                           LIVER FUNCTIONS - ( 30 Mar 2022 05:00 )  Alb: 3.1 g/dL / Pro: 6.0 g/dL / ALK PHOS: 88 U/L / ALT: 13 U/L / AST: 18 U/L / GGT: x                                                  Culture - Abscess with Gram Stain (collected 28 Mar 2022 17:23)  Source: .Abscess R foot wound  Preliminary Report (29 Mar 2022 20:11):    Rare Staphylococcus aureus    Few Staphylococcus lugdunensis                                                                                           MEDICATIONS  (STANDING):  cefepime   IVPB 2000 milliGRAM(s) IV Intermittent every 8 hours  chlorhexidine 4% Liquid 1 Application(s) Topical <User Schedule>  collagenase Ointment 1 Application(s) Topical two times a day  enoxaparin Injectable 40 milliGRAM(s) SubCutaneous every 24 hours  gabapentin Solution 300 milliGRAM(s) Oral every 12 hours  hydrocortisone sodium succinate Injectable 50 milliGRAM(s) IV Push every 8 hours  melatonin 5 milliGRAM(s) Oral at bedtime  midodrine. 10 milliGRAM(s) Oral every 8 hours  multivitamin 1 Tablet(s) Oral daily  mupirocin 2% Ointment 1 Application(s) Topical two times a day  pantoprazole   Suspension 40 milliGRAM(s) Oral daily  raloxifene 60 milliGRAM(s) Oral daily  vancomycin  IVPB 1000 milliGRAM(s) IV Intermittent every 12 hours    MEDICATIONS  (PRN):      New X-rays reviewed:                                                                                  ECHO    CXR interpreted by me:

## 2022-03-31 LAB
ALBUMIN SERPL ELPH-MCNC: 2.9 G/DL — LOW (ref 3.5–5.2)
ALBUMIN SERPL ELPH-MCNC: 3 G/DL — LOW (ref 3.5–5.2)
ALP SERPL-CCNC: 87 U/L — SIGNIFICANT CHANGE UP (ref 30–115)
ALP SERPL-CCNC: 98 U/L — SIGNIFICANT CHANGE UP (ref 30–115)
ALT FLD-CCNC: 18 U/L — SIGNIFICANT CHANGE UP (ref 0–41)
ALT FLD-CCNC: 30 U/L — SIGNIFICANT CHANGE UP (ref 0–41)
ANION GAP SERPL CALC-SCNC: 11 MMOL/L — SIGNIFICANT CHANGE UP (ref 7–14)
ANION GAP SERPL CALC-SCNC: 11 MMOL/L — SIGNIFICANT CHANGE UP (ref 7–14)
APTT BLD: 28.1 SEC — SIGNIFICANT CHANGE UP (ref 27–39.2)
AST SERPL-CCNC: 21 U/L — SIGNIFICANT CHANGE UP (ref 0–41)
AST SERPL-CCNC: 27 U/L — SIGNIFICANT CHANGE UP (ref 0–41)
BASOPHILS # BLD AUTO: 0.08 K/UL — SIGNIFICANT CHANGE UP (ref 0–0.2)
BASOPHILS NFR BLD AUTO: 1.1 % — HIGH (ref 0–1)
BILIRUB SERPL-MCNC: <0.2 MG/DL — SIGNIFICANT CHANGE UP (ref 0.2–1.2)
BILIRUB SERPL-MCNC: <0.2 MG/DL — SIGNIFICANT CHANGE UP (ref 0.2–1.2)
BLD GP AB SCN SERPL QL: SIGNIFICANT CHANGE UP
BUN SERPL-MCNC: 10 MG/DL — SIGNIFICANT CHANGE UP (ref 10–20)
BUN SERPL-MCNC: 11 MG/DL — SIGNIFICANT CHANGE UP (ref 10–20)
CALCIUM SERPL-MCNC: 8.3 MG/DL — LOW (ref 8.5–10.1)
CALCIUM SERPL-MCNC: 8.3 MG/DL — LOW (ref 8.5–10.1)
CHLORIDE SERPL-SCNC: 102 MMOL/L — SIGNIFICANT CHANGE UP (ref 98–110)
CHLORIDE SERPL-SCNC: 104 MMOL/L — SIGNIFICANT CHANGE UP (ref 98–110)
CO2 SERPL-SCNC: 29 MMOL/L — SIGNIFICANT CHANGE UP (ref 17–32)
CO2 SERPL-SCNC: 30 MMOL/L — SIGNIFICANT CHANGE UP (ref 17–32)
CREAT SERPL-MCNC: 0.6 MG/DL — LOW (ref 0.7–1.5)
CREAT SERPL-MCNC: 0.7 MG/DL — SIGNIFICANT CHANGE UP (ref 0.7–1.5)
EGFR: 102 ML/MIN/1.73M2 — SIGNIFICANT CHANGE UP
EGFR: 106 ML/MIN/1.73M2 — SIGNIFICANT CHANGE UP
EOSINOPHIL # BLD AUTO: 0.18 K/UL — SIGNIFICANT CHANGE UP (ref 0–0.7)
EOSINOPHIL NFR BLD AUTO: 2.5 % — SIGNIFICANT CHANGE UP (ref 0–8)
GLUCOSE SERPL-MCNC: 104 MG/DL — HIGH (ref 70–99)
GLUCOSE SERPL-MCNC: 208 MG/DL — HIGH (ref 70–99)
HCT VFR BLD CALC: 39.8 % — SIGNIFICANT CHANGE UP (ref 37–47)
HCT VFR BLD CALC: 41.8 % — SIGNIFICANT CHANGE UP (ref 37–47)
HGB BLD-MCNC: 13.2 G/DL — SIGNIFICANT CHANGE UP (ref 12–16)
HGB BLD-MCNC: 14.4 G/DL — SIGNIFICANT CHANGE UP (ref 12–16)
IMM GRANULOCYTES NFR BLD AUTO: 0.3 % — SIGNIFICANT CHANGE UP (ref 0.1–0.3)
INR BLD: 1 RATIO — SIGNIFICANT CHANGE UP (ref 0.65–1.3)
INR BLD: 1.03 RATIO — SIGNIFICANT CHANGE UP (ref 0.65–1.3)
LYMPHOCYTES # BLD AUTO: 2.39 K/UL — SIGNIFICANT CHANGE UP (ref 1.2–3.4)
LYMPHOCYTES # BLD AUTO: 33.1 % — SIGNIFICANT CHANGE UP (ref 20.5–51.1)
MAGNESIUM SERPL-MCNC: 2.2 MG/DL — SIGNIFICANT CHANGE UP (ref 1.8–2.4)
MAGNESIUM SERPL-MCNC: 2.2 MG/DL — SIGNIFICANT CHANGE UP (ref 1.8–2.4)
MCHC RBC-ENTMCNC: 31.4 PG — HIGH (ref 27–31)
MCHC RBC-ENTMCNC: 32.5 PG — HIGH (ref 27–31)
MCHC RBC-ENTMCNC: 33.2 G/DL — SIGNIFICANT CHANGE UP (ref 32–37)
MCHC RBC-ENTMCNC: 34.4 G/DL — SIGNIFICANT CHANGE UP (ref 32–37)
MCV RBC AUTO: 94.4 FL — SIGNIFICANT CHANGE UP (ref 81–99)
MCV RBC AUTO: 94.5 FL — SIGNIFICANT CHANGE UP (ref 81–99)
MONOCYTES # BLD AUTO: 0.6 K/UL — SIGNIFICANT CHANGE UP (ref 0.1–0.6)
MONOCYTES NFR BLD AUTO: 8.3 % — SIGNIFICANT CHANGE UP (ref 1.7–9.3)
NEUTROPHILS # BLD AUTO: 3.96 K/UL — SIGNIFICANT CHANGE UP (ref 1.4–6.5)
NEUTROPHILS NFR BLD AUTO: 54.7 % — SIGNIFICANT CHANGE UP (ref 42.2–75.2)
NRBC # BLD: 0 /100 WBCS — SIGNIFICANT CHANGE UP (ref 0–0)
NRBC # BLD: 0 /100 WBCS — SIGNIFICANT CHANGE UP (ref 0–0)
PLATELET # BLD AUTO: 276 K/UL — SIGNIFICANT CHANGE UP (ref 130–400)
PLATELET # BLD AUTO: 285 K/UL — SIGNIFICANT CHANGE UP (ref 130–400)
POTASSIUM SERPL-MCNC: 3.3 MMOL/L — LOW (ref 3.5–5)
POTASSIUM SERPL-MCNC: 3.8 MMOL/L — SIGNIFICANT CHANGE UP (ref 3.5–5)
POTASSIUM SERPL-SCNC: 3.3 MMOL/L — LOW (ref 3.5–5)
POTASSIUM SERPL-SCNC: 3.8 MMOL/L — SIGNIFICANT CHANGE UP (ref 3.5–5)
PROT SERPL-MCNC: 5.8 G/DL — LOW (ref 6–8)
PROT SERPL-MCNC: 5.8 G/DL — LOW (ref 6–8)
PROTHROM AB SERPL-ACNC: 11.5 SEC — SIGNIFICANT CHANGE UP (ref 9.95–12.87)
PROTHROM AB SERPL-ACNC: 11.8 SEC — SIGNIFICANT CHANGE UP (ref 9.95–12.87)
RBC # BLD: 4.21 M/UL — SIGNIFICANT CHANGE UP (ref 4.2–5.4)
RBC # BLD: 4.43 M/UL — SIGNIFICANT CHANGE UP (ref 4.2–5.4)
RBC # FLD: 13.7 % — SIGNIFICANT CHANGE UP (ref 11.5–14.5)
RBC # FLD: 14.1 % — SIGNIFICANT CHANGE UP (ref 11.5–14.5)
SODIUM SERPL-SCNC: 143 MMOL/L — SIGNIFICANT CHANGE UP (ref 135–146)
SODIUM SERPL-SCNC: 144 MMOL/L — SIGNIFICANT CHANGE UP (ref 135–146)
WBC # BLD: 6.87 K/UL — SIGNIFICANT CHANGE UP (ref 4.8–10.8)
WBC # BLD: 7.23 K/UL — SIGNIFICANT CHANGE UP (ref 4.8–10.8)
WBC # FLD AUTO: 6.87 K/UL — SIGNIFICANT CHANGE UP (ref 4.8–10.8)
WBC # FLD AUTO: 7.23 K/UL — SIGNIFICANT CHANGE UP (ref 4.8–10.8)

## 2022-03-31 PROCEDURE — 99232 SBSQ HOSP IP/OBS MODERATE 35: CPT

## 2022-03-31 RX ORDER — CEFAZOLIN SODIUM 1 G
2000 VIAL (EA) INJECTION EVERY 8 HOURS
Refills: 0 | Status: DISCONTINUED | OUTPATIENT
Start: 2022-03-31 | End: 2022-04-01

## 2022-03-31 RX ORDER — POTASSIUM CHLORIDE 20 MEQ
20 PACKET (EA) ORAL ONCE
Refills: 0 | Status: COMPLETED | OUTPATIENT
Start: 2022-03-31 | End: 2022-03-31

## 2022-03-31 RX ADMIN — MIDODRINE HYDROCHLORIDE 10 MILLIGRAM(S): 2.5 TABLET ORAL at 15:02

## 2022-03-31 RX ADMIN — CEFEPIME 100 MILLIGRAM(S): 1 INJECTION, POWDER, FOR SOLUTION INTRAMUSCULAR; INTRAVENOUS at 05:28

## 2022-03-31 RX ADMIN — Medication 5 MILLIGRAM(S): at 21:05

## 2022-03-31 RX ADMIN — Medication 50 MILLIGRAM(S): at 21:03

## 2022-03-31 RX ADMIN — Medication 1 APPLICATION(S): at 05:29

## 2022-03-31 RX ADMIN — PANTOPRAZOLE SODIUM 40 MILLIGRAM(S): 20 TABLET, DELAYED RELEASE ORAL at 12:39

## 2022-03-31 RX ADMIN — CHLORHEXIDINE GLUCONATE 1 APPLICATION(S): 213 SOLUTION TOPICAL at 05:30

## 2022-03-31 RX ADMIN — Medication 50 MILLIGRAM(S): at 15:51

## 2022-03-31 RX ADMIN — MIDODRINE HYDROCHLORIDE 10 MILLIGRAM(S): 2.5 TABLET ORAL at 21:05

## 2022-03-31 RX ADMIN — Medication 50 MILLIGRAM(S): at 05:30

## 2022-03-31 RX ADMIN — Medication 1 TABLET(S): at 12:39

## 2022-03-31 RX ADMIN — MUPIROCIN 1 APPLICATION(S): 20 OINTMENT TOPICAL at 05:30

## 2022-03-31 RX ADMIN — RALOXIFENE HYDROCHLORIDE 60 MILLIGRAM(S): 60 TABLET, COATED ORAL at 12:39

## 2022-03-31 RX ADMIN — GABAPENTIN 300 MILLIGRAM(S): 400 CAPSULE ORAL at 17:54

## 2022-03-31 RX ADMIN — Medication 100 MILLIGRAM(S): at 21:05

## 2022-03-31 RX ADMIN — GABAPENTIN 300 MILLIGRAM(S): 400 CAPSULE ORAL at 05:29

## 2022-03-31 RX ADMIN — Medication 1 APPLICATION(S): at 17:56

## 2022-03-31 RX ADMIN — Medication 250 MILLIGRAM(S): at 05:36

## 2022-03-31 RX ADMIN — Medication 20 MILLIEQUIVALENT(S): at 17:52

## 2022-03-31 RX ADMIN — MUPIROCIN 1 APPLICATION(S): 20 OINTMENT TOPICAL at 18:47

## 2022-03-31 RX ADMIN — MIDODRINE HYDROCHLORIDE 10 MILLIGRAM(S): 2.5 TABLET ORAL at 05:29

## 2022-03-31 NOTE — PROGRESS NOTE ADULT - SUBJECTIVE AND OBJECTIVE BOX
Podiatry Progress Note    Subjective:   TEA ECHAVARRIA is a well-nourished, 56y Female in no acute distress, alert awake, and not oriented to person, place and time.  Patient is a 56y old  Female who presents with a chief complaint of Aspiration pneumonia (30 Mar 2022 16:12). Pt seen & evaluated at bedside. PT is nonverbal at bedside.       PAST MEDICAL & SURGICAL HISTORY:  Down syndrome    Osteoporosis    Mild anemia    Neuropathy    S/P debridement  of R hip on 3/2/21         Objective:  Vital Signs Last 24 Hrs  T(C): 36.5 (31 Mar 2022 04:35), Max: 36.7 (30 Mar 2022 20:59)  T(F): 97.7 (31 Mar 2022 04:35), Max: 98 (30 Mar 2022 20:59)  HR: 71 (31 Mar 2022 04:35) (52 - 78)  BP: 95/50 (31 Mar 2022 04:35) (93/51 - 151/76)  BP(mean): 75 (30 Mar 2022 10:00) (75 - 91)  RR: 17 (31 Mar 2022 04:35) (10 - 18)  SpO2: 95% (30 Mar 2022 20:59) (94% - 98%)                        14.7   6.96  )-----------( 273      ( 30 Mar 2022 05:00 )             43.0                 03-30    144  |  104  |  10  ----------------------------<  164<H>  4.3   |  30  |  0.7    Ca    8.6      30 Mar 2022 05:00  Mg     2.1     03-30    TPro  6.0  /  Alb  3.1<L>  /  TBili  <0.2  /  DBili  x   /  AST  18  /  ALT  13  /  AlkPhos  88  03-30      ------------------------------    Physical Exam - Lower Extremity Focused:   #Right Lower Extremity  Derm:   Open Right Plantar Pressure Ulcer @ 1st Submetatarsal;  Wound Base Fibrogranular (50% Fibrous, 50% Granular); Probes to Bone w/ Minimal Serosanguinous Drainage;  Toes are warm, capillary refill is instant to the toes;  Mild Erythema and Skin Desquamation to Periwound;     Vascular: DP and PT Pulses Palpable; Foot is Warm to Warm to the touch   Neuro: Not assessable 2/2 Mental Status  MSK: Not assessable 2/2 Mental Status       Assessment:  Right Forefoot Plantar Ulcer (1st Submetatarsal head) - Probes to Bone    Plan:  Chart reviewed and Patient evaluated.   Wound Flushed w/ NS; Wound Dressed w/ Xeroform / DSD / Kerlix; q24  Local Wound Care; As Stated Above   A-Duplex B/L (3/29/22);   -Normal arterial flow in the visualized arteries of bilateral lower extremities.  -Bilateral peroneal arteries were not visualized.  -Difficult study due to patient positioning.  ID consult active; FXR-R shows septic arthritis/osteomyelitis at the first MTP join of right foot; will f/u recs;  Sx scheduled Thurs 3/31 @ 1:00PM; excisional debridement of soft tissue and bone with possible 1st metatarsal head resection R foot;  Cardio Clearance appreciated; Moderate risk for MACE;  Request pre-lab optimization and OR stratification prior to sx Thurs 3/31;   NPO @ MN Wed 3/30;   Weight bearing status; WBAT BL feet;   Discussed plan w/ Dr. Everett;     Podiatry      Podiatry Progress Note    Subjective:   TEA ECHAVARRIA is a well-nourished, 56y Female in no acute distress, alert awake, and not oriented to person, place and time.  Patient is a 56y old  Female who presents with a chief complaint of Aspiration pneumonia (30 Mar 2022 16:12). Pt seen & evaluated at bedside. PT is nonverbal at bedside.       PAST MEDICAL & SURGICAL HISTORY:  Down syndrome    Osteoporosis    Mild anemia    Neuropathy    S/P debridement  of R hip on 3/2/21         Objective:  Vital Signs Last 24 Hrs  T(C): 36.5 (31 Mar 2022 04:35), Max: 36.7 (30 Mar 2022 20:59)  T(F): 97.7 (31 Mar 2022 04:35), Max: 98 (30 Mar 2022 20:59)  HR: 71 (31 Mar 2022 04:35) (52 - 78)  BP: 95/50 (31 Mar 2022 04:35) (93/51 - 151/76)  BP(mean): 75 (30 Mar 2022 10:00) (75 - 91)  RR: 17 (31 Mar 2022 04:35) (10 - 18)  SpO2: 95% (30 Mar 2022 20:59) (94% - 98%)                        14.7   6.96  )-----------( 273      ( 30 Mar 2022 05:00 )             43.0                 03-30    144  |  104  |  10  ----------------------------<  164<H>  4.3   |  30  |  0.7    Ca    8.6      30 Mar 2022 05:00  Mg     2.1     03-30    TPro  6.0  /  Alb  3.1<L>  /  TBili  <0.2  /  DBili  x   /  AST  18  /  ALT  13  /  AlkPhos  88  03-30      ------------------------------    Physical Exam - Lower Extremity Focused:   #Right Lower Extremity  Derm:   Open Right Plantar Pressure Ulcer @ 1st Submetatarsal;  Wound Base Fibrogranular (50% Fibrous, 50% Granular); Probes to Bone w/ Minimal Serosanguinous Drainage;  Toes are warm, capillary refill is instant to the toes;  Mild Erythema and Skin Desquamation to Periwound;     Vascular: DP and PT Pulses Palpable; Foot is Warm to Warm to the touch   Neuro: Not assessable 2/2 Mental Status  MSK: Contracted; Further examination not assessable 2/2 Mental Status       Assessment:  Right Forefoot Plantar Ulcer (1st Submetatarsal head) - Probes to Bone    Plan:  Chart reviewed and Patient evaluated.   Wound Flushed w/ NS; Wound Dressed w/ Xeroform / DSD / Kerlix; q24  Local Wound Care; As Stated Above   A-Duplex B/L (3/29/22);   -Normal arterial flow in the visualized arteries of bilateral lower extremities.  -Bilateral peroneal arteries were not visualized.  -Difficult study due to patient positioning.  ID consult active; FXR-R shows septic arthritis/osteomyelitis at the first MTP join of right foot; will f/u recs;  Sx scheduled Thurs 3/31 @ 1:00PM; excisional debridement of soft tissue and bone with possible 1st metatarsal head resection R foot;  Cardio Clearance appreciated; Moderate risk for MACE;  Request pre-lab optimization and OR stratification prior to sx Thurs 3/31;   NPO @ MN Wed 3/30;   Weight bearing status; WBAT BL feet;   Discussed plan w/ Dr. Everett;     Podiatry      Podiatry Progress Note    Subjective:   TEA ECHAVARRIA is a well-nourished, 56y Female in no acute distress, alert awake, and not oriented to person, place and time.  Patient is a 56y old  Female who presents with a chief complaint of Aspiration pneumonia (30 Mar 2022 16:12). Pt seen & evaluated at bedside. PT is nonverbal at bedside.       PAST MEDICAL & SURGICAL HISTORY:  Down syndrome    Osteoporosis    Mild anemia    Neuropathy    S/P debridement  of R hip on 3/2/21         Objective:  Vital Signs Last 24 Hrs  T(C): 36.5 (31 Mar 2022 04:35), Max: 36.7 (30 Mar 2022 20:59)  T(F): 97.7 (31 Mar 2022 04:35), Max: 98 (30 Mar 2022 20:59)  HR: 71 (31 Mar 2022 04:35) (52 - 78)  BP: 95/50 (31 Mar 2022 04:35) (93/51 - 151/76)  BP(mean): 75 (30 Mar 2022 10:00) (75 - 91)  RR: 17 (31 Mar 2022 04:35) (10 - 18)  SpO2: 95% (30 Mar 2022 20:59) (94% - 98%)                        14.7   6.96  )-----------( 273      ( 30 Mar 2022 05:00 )             43.0                 03-30    144  |  104  |  10  ----------------------------<  164<H>  4.3   |  30  |  0.7    Ca    8.6      30 Mar 2022 05:00  Mg     2.1     03-30    TPro  6.0  /  Alb  3.1<L>  /  TBili  <0.2  /  DBili  x   /  AST  18  /  ALT  13  /  AlkPhos  88  03-30      ------------------------------    Physical Exam - Lower Extremity Focused:   #Right Lower Extremity  Derm:   Open Right Plantar Pressure Ulcer @ 1st Submetatarsal;  Wound Base Fibrogranular (50% Fibrous, 50% Granular); Probes to Bone w/ Minimal Serosanguinous Drainage;  Toes are warm, capillary refill is instant to the toes;  Mild Erythema and Skin Desquamation to Periwound;     Vascular: DP and PT Pulses Palpable; Foot is Warm to Warm to the touch   Neuro: Not assessable 2/2 Mental Status  MSK: Contracted; Further examination not assessable 2/2 Mental Status       Assessment:  Right Forefoot Plantar Ulcer (1st Submetatarsal head) - Probes to Bone    Plan:  Chart reviewed and Patient evaluated.   Wound Flushed w/ NS; Wound Dressed w/ Xeroform / DSD / Kerlix; q24  Local Wound Care; As Stated Above   A-Duplex B/L (3/29/22);   -Normal arterial flow in the visualized arteries of bilateral lower extremities.  -Bilateral peroneal arteries were not visualized.  -Difficult study due to patient positioning.  ID consult active; FXR-R shows septic arthritis/osteomyelitis at the first MTP join of right foot; will f/u recs;  Sx scheduled Fri 4/1 @ 1:00PM; excisional debridement of soft tissue and bone with possible 1st metatarsal head resection R foot;  Cardio Clearance appreciated; Moderate risk for MACE;  Request pre-lab optimization and OR stratification prior to sx Fri 4/1;   NPO @ MN Th 3/31;   Weight bearing status; WBAT BL feet;   Discussed plan w/ Dr. Everett;     Podiatry

## 2022-03-31 NOTE — CONSULT NOTE ADULT - SUBJECTIVE AND OBJECTIVE BOX
TEA ECHAVARRIA  56y, Female  Allergy: No Known Allergies      All historical available data reviewed.    HPI:  57y/o female with a pmhx of down syndrome, non-verbal, hypothyroidism, osteoporosis coming from [facility] here for eval multiple episodes of vomiting that began today. Pt was also found to be hypoxic and febrile. History was obtained from aid at bedside. This morning patient "vomited a lot", NBNB, then was found to be hypotensive and hypoxic to 86% on RA. Patient had been acting her usual self until then with no indication of distress. At baseline she sits in wheelchair, is nonverbal, and does not communicate in any way.    ED course: T 101, , BP min 71/36, 94% on 4L. UA positive. Initial lactate 2.2. CT showed bilateral interstitial pulmonary thickening, which may reflect aspiration pneumonitis, and bladder wall thickening.  She received azithromycin, ceftriaxone and 2.5L LR. Lactate improved to 1.8 but BP did not, so she was started on levophed.      Family: (foster mother): Ana Katz, [phone]  Consumer advisory board: Amy Rich, [phone]     (24 Mar 2022 20:31)    FAMILY HISTORY:  No pertinent family history in first degree relatives      PAST MEDICAL & SURGICAL HISTORY:  Down syndrome    Osteoporosis    Mild anemia    Neuropathy    S/P debridement  of R hip on 3/2/21          VITALS:  T(F): 98.4, Max: 98.4 (03-31-22 @ 13:26)  HR: 85  BP: 100/60  RR: 16Vital Signs Last 24 Hrs  T(C): 36.9 (31 Mar 2022 13:26), Max: 36.9 (31 Mar 2022 13:26)  T(F): 98.4 (31 Mar 2022 13:26), Max: 98.4 (31 Mar 2022 13:26)  HR: 85 (31 Mar 2022 13:26) (71 - 85)  BP: 100/60 (31 Mar 2022 13:26) (95/50 - 151/76)  BP(mean): --  RR: 16 (31 Mar 2022 13:26) (16 - 18)  SpO2: 95% (30 Mar 2022 20:59) (95% - 95%)    TESTS & MEASUREMENTS:                        13.2   7.23  )-----------( 276      ( 31 Mar 2022 04:30 )             39.8     03-31    143  |  102  |  10  ----------------------------<  104<H>  3.3<L>   |  30  |  0.6<L>    Ca    8.3<L>      31 Mar 2022 04:30  Mg     2.2     03-31    TPro  5.8<L>  /  Alb  2.9<L>  /  TBili  <0.2  /  DBili  x   /  AST  21  /  ALT  18  /  AlkPhos  87  03-31    LIVER FUNCTIONS - ( 31 Mar 2022 04:30 )  Alb: 2.9 g/dL / Pro: 5.8 g/dL / ALK PHOS: 87 U/L / ALT: 18 U/L / AST: 21 U/L / GGT: x             Culture - Abscess with Gram Stain (collected 03-28-22 @ 17:23)  Source: .Abscess R foot wound  Preliminary Report (03-29-22 @ 20:11):    Rare Staphylococcus aureus    Few Staphylococcus lugdunensis  Organism: Staphylococcus aureus  Staphylococcus lugdunensis (03-30-22 @ 21:38)  Organism: Staphylococcus lugdunensis (03-30-22 @ 21:38)      -  Ampicillin/Sulbactam: R <=8/4      -  Cefazolin: R <=4      -  Clindamycin: S <=0.25      -  Erythromycin: R >4      -  Gentamicin: S <=1 Should not be used as monotherapy      -  Oxacillin: R >2      -  Penicillin: R >8      -  Rifampin: S <=1 Should not be used as monotherapy      -  Tetra/Doxy: R >8      -  Trimethoprim/Sulfamethoxazole: R >2/38      -  Vancomycin: S 1      Method Type: ZANE  Organism: Staphylococcus aureus (03-30-22 @ 21:37)      -  Ampicillin/Sulbactam: S <=8/4      -  Cefazolin: S <=4      -  Clindamycin: R <=0.25 This isolate is presumed to be clindamycin resistant based on detection of inducible resistance. Clindamycin may still be effective in some patients.      -  Erythromycin: R >4      -  Gentamicin: R >8 Should not be used as monotherapy      -  Oxacillin: S <=0.25      -  Penicillin: R 8      -  Rifampin: S <=1 Should not be used as monotherapy      -  Tetra/Doxy: S <=1      -  Trimethoprim/Sulfamethoxazole: S <=0.5/9.5      -  Vancomycin: S 1      Method Type: ZANE            RADIOLOGY & ADDITIONAL TESTS:  Personal review of radiological diagnostics performed  Echo and EKG results noted when applicable.     MEDICATIONS:  cefepime   IVPB 2000 milliGRAM(s) IV Intermittent every 8 hours  chlorhexidine 4% Liquid 1 Application(s) Topical <User Schedule>  collagenase Ointment 1 Application(s) Topical two times a day  enoxaparin Injectable 40 milliGRAM(s) SubCutaneous every 24 hours  gabapentin Solution 300 milliGRAM(s) Oral every 12 hours  hydrocortisone sodium succinate Injectable 50 milliGRAM(s) IV Push every 8 hours  melatonin 5 milliGRAM(s) Oral at bedtime  midodrine. 10 milliGRAM(s) Oral every 8 hours  multivitamin 1 Tablet(s) Oral daily  mupirocin 2% Ointment 1 Application(s) Topical two times a day  pantoprazole   Suspension 40 milliGRAM(s) Oral daily  potassium chloride   Powder 20 milliEquivalent(s) Oral once  raloxifene 60 milliGRAM(s) Oral daily  vancomycin  IVPB 1000 milliGRAM(s) IV Intermittent every 12 hours      ANTIBIOTICS:  cefepime   IVPB 2000 milliGRAM(s) IV Intermittent every 8 hours  vancomycin  IVPB 1000 milliGRAM(s) IV Intermittent every 12 hours

## 2022-03-31 NOTE — CHART NOTE - NSCHARTNOTEFT_GEN_A_CORE
Podiatry;    Podiatry scheduled for sx Fri 4/1; excisional debridement of soft tissue and bone with possible 1st metatarsal head resection R foot;   COVID status pending result; much appreciated to renew COVID status prior to sx tomorrow;    Podiatry [phone];

## 2022-03-31 NOTE — PROGRESS NOTE ADULT - SUBJECTIVE AND OBJECTIVE BOX
TEA ECHAVARRIA 56y Female  MRN#: [id number]   CODE STATUS: full       SUBJECTIVE  Patient is a 56y old Female who presents with a chief complaint of Aspiration pneumonia (31 Mar 2022 07:34)  Currently admitted to medicine with the primary diagnosis of Septic shock    Today is hospital day 7d, and this morning she is resting in bed.   No overnight events.       OBJECTIVE  PAST MEDICAL & SURGICAL HISTORY  Down syndrome    Osteoporosis    Mild anemia    Neuropathy    S/P debridement  of R hip on 3/2/21      ALLERGIES:  No Known Allergies    MEDICATIONS:  STANDING MEDICATIONS  cefepime   IVPB 2000 milliGRAM(s) IV Intermittent every 8 hours  chlorhexidine 4% Liquid 1 Application(s) Topical <User Schedule>  collagenase Ointment 1 Application(s) Topical two times a day  enoxaparin Injectable 40 milliGRAM(s) SubCutaneous every 24 hours  gabapentin Solution 300 milliGRAM(s) Oral every 12 hours  hydrocortisone sodium succinate Injectable 50 milliGRAM(s) IV Push every 8 hours  melatonin 5 milliGRAM(s) Oral at bedtime  midodrine. 10 milliGRAM(s) Oral every 8 hours  multivitamin 1 Tablet(s) Oral daily  mupirocin 2% Ointment 1 Application(s) Topical two times a day  pantoprazole   Suspension 40 milliGRAM(s) Oral daily  raloxifene 60 milliGRAM(s) Oral daily  vancomycin  IVPB 1000 milliGRAM(s) IV Intermittent every 12 hours    PRN MEDICATIONS      VITAL SIGNS: Last 24 Hours  T(C): 36.5 (31 Mar 2022 04:35), Max: 36.7 (30 Mar 2022 20:59)  T(F): 97.7 (31 Mar 2022 04:35), Max: 98 (30 Mar 2022 20:59)  HR: 71 (31 Mar 2022 04:35) (71 - 78)  BP: 95/50 (31 Mar 2022 04:35) (93/51 - 151/76)  BP(mean): --  RR: 17 (31 Mar 2022 04:35) (16 - 18)  SpO2: 95% (30 Mar 2022 20:59) (95% - 98%)    LABS:                        13.2   7.23  )-----------( 276      ( 31 Mar 2022 04:30 )             39.8     03-31    143  |  102  |  10  ----------------------------<  104<H>  3.3<L>   |  30  |  0.6<L>    Ca    8.3<L>      31 Mar 2022 04:30  Mg     2.2     03-31    TPro  5.8<L>  /  Alb  2.9<L>  /  TBili  <0.2  /  DBili  x   /  AST  21  /  ALT  18  /  AlkPhos  87  03-31    PT/INR - ( 30 Mar 2022 20:00 )   PT: 12.30 sec;   INR: 1.07 ratio    PTT - ( 30 Mar 2022 16:00 )  PTT:27.4 sec    Culture - Abscess with Gram Stain (collected 28 Mar 2022 17:23)  Source: .Abscess R foot wound  Preliminary Report (29 Mar 2022 20:11):    Rare Staphylococcus aureus    Few Staphylococcus lugdunensis  Organism: Staphylococcus aureus  Staphylococcus lugdunensis (30 Mar 2022 21:38)  Organism: Staphylococcus lugdunensis (30 Mar 2022 21:38)  Organism: Staphylococcus aureus (30 Mar 2022 21:37)        RADIOLOGY:  < from: VA Duplex Lower Extrem Arterial, Bilat (03.29.22 @ 11:48) >  Impression:  Normal arterial flow in the visualized arteries of bilateral lower   extremities.  Bilateral peroneal arteries were not visualized.  Difficult study due to patient positioning.  < end of copied text >    < from: Xray Foot AP + Lateral + Oblique, Right (03.28.22 @ 17:05) >  IMPRESSION:  Findings consistent with septic arthritis/osteomyelitis at the first MTP   joint as above.  --- End of Report ---  < end of copied text >      < from: Xray Chest 1 View- PORTABLE-Routine (Xray Chest 1 View- PORTABLE-Routine in AM.) (03.28.22 @ 06:19) >  Impression:  Limited study due to patient rotation.  Persistent left-sided opacities.  Previously seen right IJ line, now appears over midline, presumably   related to patient rotation. Attention on follow-up.  --- End of Report ---  < end of copied text >      PHYSICAL EXAM:  GENERAL: NAD, well-developed, non verbal   HEENT:  Atraumatic, Normocephalic. conjunctiva and sclera clear, No JVD  PULMONARY: decreased breath sounds bilaterally; No wheeze  CARDIOVASCULAR: Regular rate and rhythm; No murmurs, rubs, or gallops  GASTROINTESTINAL: Soft, Nontender, Nondistended; Bowel sounds present  MUSCULOSKELETAL: LE contracted, No clubbing, cyanosis, or edema  NEUROLOGY: non-focal  SKIN: right foot wrapped in Kerlix         ASSESSMENT & PLAN  57 y/o female with a pmhx of down syndrome, non-verbal, hypothyroidism, osteoporosis presents after multiple episodes of vomiting admitted for septic shock.    #Septic shock s/p pressors POA (resolved)  #Acute hypoxic respiratory failure 2/2 Aspiration pneumonia/pneumonitis  #Acute cystitis  - MRSA positive  - CXR: Persistent left-sided opacities.  - F/u BCx and Urinary Cx. urine negative, no growth to date in blood  - F/u sputum culture. Strep/Legionella Ag both negative  - cont midodrine 10mg q8  - s/p 7 days Vancomycin and Cefepime     #R heel ulcer, worsening  - Right foot xray: Findings consistent with septic arthritis/osteomyelitis at the first MTP joint  - Arterial duplex: Normal arterial flow in the visualized arteries of bilateral lower extremities. Bilateral peroneal arteries were not visualized.  - ESR 40, CRP 16   - podiatry recs: debridement tomorrow   - pt cleared by cardio for surgery     #Sinus Bradycardia   - TTE EF 65%. mild TR, mild AS   - TSH 4.1, T4 8.4   -Avoid sinus/AV dayanara blocking agents .    #Osteoporosis-Continue Raloxifene  #Neuropathy- Continue Gabapentin  #Downs Syndrome- Non verbal, Wheelchair bound    dvt ppx: lovenox   gi ppx: PPI   diet: pureed with mildly thick liquids, NPO MN   activity: bed bound

## 2022-03-31 NOTE — CONSULT NOTE ADULT - ASSESSMENT
55y/o female with a pmhx of down syndrome, non-verbal, hypothyroidism, osteoporosis coming from [facility] here for eval multiple episodes of vomiting that began today. Pt was also found to be hypoxic and febrile. History was obtained from aid at bedside. This morning patient "vomited a lot", NBNB, then was found to be hypotensive and hypoxic to 86% on RA. Patient had been acting her usual self until then with no indication of distress. At baseline she sits in wheelchair, is nonverbal, and does not communicate in any way.    ED course: T 101, , BP min 71/36, 94% on 4L. UA positive. Initial lactate 2.2.   CT showed bilateral interstitial pulmonary thickening, which may reflect aspiration pneumonitis, and bladder wall thickening.      IMPRESSION;  Resolved sepsis secondary to possible LLL bacterial PNA secondary to aspiration   R foot with septic arthritis 1st met head area  3/28 R foot : ZACHARY, S lugdunensis  legeionella NG  Blood & Urine cxs NGTD 3/24    RECOMMENDATIONS;  D/c vanco/cefepime  Ancef 2 gm iv q8h  Most important therapeutic intervention would be off loading of the right foot off the bed at all times  On discharge change to po Augmentin 875 mg q12h for 4 more weeks  Please do not hesitate to recall ID if any questions arise either through [place] or through [employer]

## 2022-03-31 NOTE — CDI QUERY NOTE - NSCDIOTHERTXTBX_GEN_ALL_CORE_HH
CLINICAL INDICATORS	  3/30 Progress Note Adult-Internal Medicine Attending: Reason for admission-aspiration pneumonia … EXTREMITIES: chronic changes    3/31 Progress Note Adult-Internal Medicine Resident: MUSCULOSKELETAL: LE contracted … 57 y/o female with a pmhx of down syndrome, non-verbal, hypothyroidism, osteoporosis presents after multiple episodes of vomiting admitted for septic shock … Non verbal, Wheelchair bound … activity: bed bound    3/31 Nursing Functional Assessment: Basic Mobility-Total assistance; Daily Activity-Total Assistance    3/31 Nursing [place] Assessment: Activity-bedfast, Mobility-very limited    Based on your professional judgment and the clinical indicators, please clarify if the documentation of bedbound can be further specified as:     • Functional quadriplegia  • Complete immobility due to severe physical disability  • Complete immobility due to severe frailty  • Other (please specify):  • Clinically unable to further specify bedbound    Thank you,  Kaela Scott RN [facility]  [phone]

## 2022-03-31 NOTE — PROGRESS NOTE ADULT - SUBJECTIVE AND OBJECTIVE BOX
JERICHO TEA  56y  Female  afebrile  bp low at times, but mostly acceptable  no bradycardia  cardio- cleared for surgery, moderate risk  ID appreciated  pt has functional quadriplegia, present on admission  admitted w sepsis sec to aspiration pna, also has a chronic ulcer on rt foot that deteriorated and has septic arthritis- going for surgery  had bradycardia- resolved    INTERVAL EVENTS:    T(C): 36.9 (03-31-22 @ 13:26), Max: 36.9 (03-31-22 @ 13:26)  HR: 85 (03-31-22 @ 13:26) (71 - 85)  BP: 100/60 (03-31-22 @ 13:26) (95/50 - 151/76)  RR: 16 (03-31-22 @ 13:26) (16 - 18)  SpO2: 95% (03-30-22 @ 20:59) (95% - 95%)  Wt(kg): --Vital Signs Last 24 Hrs  T(C): 36.9 (31 Mar 2022 13:26), Max: 36.9 (31 Mar 2022 13:26)  T(F): 98.4 (31 Mar 2022 13:26), Max: 98.4 (31 Mar 2022 13:26)  HR: 85 (31 Mar 2022 13:26) (71 - 85)  BP: 100/60 (31 Mar 2022 13:26) (95/50 - 151/76)  BP(mean): --  RR: 16 (31 Mar 2022 13:26) (16 - 18)  SpO2: 95% (30 Mar 2022 20:59) (95% - 95%)    PHYSICAL EXAM:  GENERAL: resting comfortably  NECK: Supple, No JVD, Normal thyroid  CHEST/LUNG: Clear; No crackles or wheezing  HEART: S1, S2, Regular rate and rhythm;   ABDOMEN: Soft, Nontender, Nondistended; Bowel sounds present  EXTREMITIES: chronic changes, rt foot ulcer  SKIN: No rashes or lesions    LABS:                          13.2   7.23  )-----------( 276      ( 31 Mar 2022 04:30 )             39.8     03-31    143  |  102  |  10  ----------------------------<  104<H>  3.3<L>   |  30  |  0.6<L>    Ca    8.3<L>      31 Mar 2022 04:30  Mg     2.2     03-31    TPro  5.8<L>  /  Alb  2.9<L>  /  TBili  <0.2  /  DBili  x   /  AST  21  /  ALT  18  /  AlkPhos  87  03-31    PT/INR - ( 30 Mar 2022 20:00 )   PT: 12.30 sec;   INR: 1.07 ratio         PTT - ( 30 Mar 2022 16:00 )  PTT:27.4 sec      Culture - Abscess with Gram Stain (collected 28 Mar 2022 17:23)  Source: .Abscess R foot wound  Preliminary Report (29 Mar 2022 20:11):    Rare Staphylococcus aureus    Few Staphylococcus lugdunensis  Organism: Staphylococcus aureus  Staphylococcus lugdunensis (30 Mar 2022 21:38)  Organism: Staphylococcus lugdunensis (30 Mar 2022 21:38)  Organism: Staphylococcus aureus (30 Mar 2022 21:37)          ceFAZolin   IVPB 2000 milliGRAM(s) IV Intermittent every 8 hours  chlorhexidine 4% Liquid 1 Application(s) Topical <User Schedule>  collagenase Ointment 1 Application(s) Topical two times a day  enoxaparin Injectable 40 milliGRAM(s) SubCutaneous every 24 hours  gabapentin Solution 300 milliGRAM(s) Oral every 12 hours  hydrocortisone sodium succinate Injectable 50 milliGRAM(s) IV Push every 8 hours  melatonin 5 milliGRAM(s) Oral at bedtime  midodrine. 10 milliGRAM(s) Oral every 8 hours  multivitamin 1 Tablet(s) Oral daily  mupirocin 2% Ointment 1 Application(s) Topical two times a day  pantoprazole   Suspension 40 milliGRAM(s) Oral daily  potassium chloride   Powder 20 milliEquivalent(s) Oral once  raloxifene 60 milliGRAM(s) Oral daily      RADIOLOGY & ADDITIONAL TESTS:    case discussed with the resident  Available consult notes reviewed    Assessment and plan:     ID appreciated- follow w ab choice  surgery tomorrow  eating well  low k, - replace  low albumin- nutritional assesment elisabeth after surgery to promoteo healing  has fair circulation  dvt prophylaxis

## 2022-04-01 ENCOUNTER — RESULT REVIEW (OUTPATIENT)
Age: 57
End: 2022-04-01

## 2022-04-01 LAB
ALBUMIN SERPL ELPH-MCNC: 3 G/DL — LOW (ref 3.5–5.2)
ALP SERPL-CCNC: 96 U/L — SIGNIFICANT CHANGE UP (ref 30–115)
ALT FLD-CCNC: 25 U/L — SIGNIFICANT CHANGE UP (ref 0–41)
ANION GAP SERPL CALC-SCNC: 8 MMOL/L — SIGNIFICANT CHANGE UP (ref 7–14)
AST SERPL-CCNC: 22 U/L — SIGNIFICANT CHANGE UP (ref 0–41)
BASOPHILS # BLD AUTO: 0.06 K/UL — SIGNIFICANT CHANGE UP (ref 0–0.2)
BASOPHILS NFR BLD AUTO: 0.9 % — SIGNIFICANT CHANGE UP (ref 0–1)
BILIRUB SERPL-MCNC: <0.2 MG/DL — SIGNIFICANT CHANGE UP (ref 0.2–1.2)
BUN SERPL-MCNC: 13 MG/DL — SIGNIFICANT CHANGE UP (ref 10–20)
CALCIUM SERPL-MCNC: 8.5 MG/DL — SIGNIFICANT CHANGE UP (ref 8.5–10.1)
CHLORIDE SERPL-SCNC: 103 MMOL/L — SIGNIFICANT CHANGE UP (ref 98–110)
CO2 SERPL-SCNC: 33 MMOL/L — HIGH (ref 17–32)
CREAT SERPL-MCNC: 0.8 MG/DL — SIGNIFICANT CHANGE UP (ref 0.7–1.5)
EGFR: 87 ML/MIN/1.73M2 — SIGNIFICANT CHANGE UP
EOSINOPHIL # BLD AUTO: 0.11 K/UL — SIGNIFICANT CHANGE UP (ref 0–0.7)
EOSINOPHIL NFR BLD AUTO: 1.7 % — SIGNIFICANT CHANGE UP (ref 0–8)
GLUCOSE SERPL-MCNC: 103 MG/DL — HIGH (ref 70–99)
HCG SERPL QL: NEGATIVE — SIGNIFICANT CHANGE UP
HCT VFR BLD CALC: 43.7 % — SIGNIFICANT CHANGE UP (ref 37–47)
HGB BLD-MCNC: 14.6 G/DL — SIGNIFICANT CHANGE UP (ref 12–16)
IMM GRANULOCYTES NFR BLD AUTO: 0.5 % — HIGH (ref 0.1–0.3)
LYMPHOCYTES # BLD AUTO: 2.12 K/UL — SIGNIFICANT CHANGE UP (ref 1.2–3.4)
LYMPHOCYTES # BLD AUTO: 31.9 % — SIGNIFICANT CHANGE UP (ref 20.5–51.1)
MAGNESIUM SERPL-MCNC: 2.4 MG/DL — SIGNIFICANT CHANGE UP (ref 1.8–2.4)
MCHC RBC-ENTMCNC: 31.9 PG — HIGH (ref 27–31)
MCHC RBC-ENTMCNC: 33.4 G/DL — SIGNIFICANT CHANGE UP (ref 32–37)
MCV RBC AUTO: 95.4 FL — SIGNIFICANT CHANGE UP (ref 81–99)
MONOCYTES # BLD AUTO: 0.58 K/UL — SIGNIFICANT CHANGE UP (ref 0.1–0.6)
MONOCYTES NFR BLD AUTO: 8.7 % — SIGNIFICANT CHANGE UP (ref 1.7–9.3)
NEUTROPHILS # BLD AUTO: 3.74 K/UL — SIGNIFICANT CHANGE UP (ref 1.4–6.5)
NEUTROPHILS NFR BLD AUTO: 56.3 % — SIGNIFICANT CHANGE UP (ref 42.2–75.2)
NRBC # BLD: 0 /100 WBCS — SIGNIFICANT CHANGE UP (ref 0–0)
PLATELET # BLD AUTO: 308 K/UL — SIGNIFICANT CHANGE UP (ref 130–400)
POTASSIUM SERPL-MCNC: 3.5 MMOL/L — SIGNIFICANT CHANGE UP (ref 3.5–5)
POTASSIUM SERPL-SCNC: 3.5 MMOL/L — SIGNIFICANT CHANGE UP (ref 3.5–5)
PROT SERPL-MCNC: 5.9 G/DL — LOW (ref 6–8)
RBC # BLD: 4.58 M/UL — SIGNIFICANT CHANGE UP (ref 4.2–5.4)
RBC # FLD: 13.9 % — SIGNIFICANT CHANGE UP (ref 11.5–14.5)
SARS-COV-2 RNA SPEC QL NAA+PROBE: SIGNIFICANT CHANGE UP
SODIUM SERPL-SCNC: 144 MMOL/L — SIGNIFICANT CHANGE UP (ref 135–146)
WBC # BLD: 6.64 K/UL — SIGNIFICANT CHANGE UP (ref 4.8–10.8)
WBC # FLD AUTO: 6.64 K/UL — SIGNIFICANT CHANGE UP (ref 4.8–10.8)

## 2022-04-01 PROCEDURE — 88304 TISSUE EXAM BY PATHOLOGIST: CPT | Mod: 26

## 2022-04-01 PROCEDURE — 28122 PARTIAL REMOVAL OF FOOT BONE: CPT | Mod: 58,59

## 2022-04-01 PROCEDURE — 88311 DECALCIFY TISSUE: CPT | Mod: 26

## 2022-04-01 PROCEDURE — 73630 X-RAY EXAM OF FOOT: CPT | Mod: 26,RT

## 2022-04-01 PROCEDURE — 20240 BONE BIOPSY OPEN SUPERFICIAL: CPT | Mod: 58,59

## 2022-04-01 PROCEDURE — 97597 DBRDMT OPN WND 1ST 20 CM/<: CPT | Mod: 58,59

## 2022-04-01 RX ORDER — COLLAGENASE CLOSTRIDIUM HIST. 250 UNIT/G
1 OINTMENT (GRAM) TOPICAL
Refills: 0 | Status: DISCONTINUED | OUTPATIENT
Start: 2022-04-01 | End: 2022-04-08

## 2022-04-01 RX ORDER — HYDROCORTISONE 20 MG
50 TABLET ORAL EVERY 8 HOURS
Refills: 0 | Status: DISCONTINUED | OUTPATIENT
Start: 2022-04-01 | End: 2022-04-04

## 2022-04-01 RX ORDER — MUPIROCIN 20 MG/G
1 OINTMENT TOPICAL
Refills: 0 | Status: DISCONTINUED | OUTPATIENT
Start: 2022-04-01 | End: 2022-04-08

## 2022-04-01 RX ORDER — SODIUM CHLORIDE 9 MG/ML
1000 INJECTION, SOLUTION INTRAVENOUS
Refills: 0 | Status: DISCONTINUED | OUTPATIENT
Start: 2022-04-01 | End: 2022-04-02

## 2022-04-01 RX ORDER — GABAPENTIN 400 MG/1
300 CAPSULE ORAL EVERY 12 HOURS
Refills: 0 | Status: DISCONTINUED | OUTPATIENT
Start: 2022-04-01 | End: 2022-04-08

## 2022-04-01 RX ORDER — CEFAZOLIN SODIUM 1 G
2000 VIAL (EA) INJECTION EVERY 8 HOURS
Refills: 0 | Status: DISCONTINUED | OUTPATIENT
Start: 2022-04-01 | End: 2022-04-08

## 2022-04-01 RX ORDER — OXYCODONE AND ACETAMINOPHEN 5; 325 MG/1; MG/1
1 TABLET ORAL ONCE
Refills: 0 | Status: DISCONTINUED | OUTPATIENT
Start: 2022-04-01 | End: 2022-04-01

## 2022-04-01 RX ORDER — SODIUM CHLORIDE 9 MG/ML
1000 INJECTION, SOLUTION INTRAVENOUS
Refills: 0 | Status: DISCONTINUED | OUTPATIENT
Start: 2022-04-01 | End: 2022-04-01

## 2022-04-01 RX ORDER — LANOLIN ALCOHOL/MO/W.PET/CERES
5 CREAM (GRAM) TOPICAL AT BEDTIME
Refills: 0 | Status: DISCONTINUED | OUTPATIENT
Start: 2022-04-01 | End: 2022-04-08

## 2022-04-01 RX ORDER — MIDODRINE HYDROCHLORIDE 2.5 MG/1
10 TABLET ORAL EVERY 8 HOURS
Refills: 0 | Status: DISCONTINUED | OUTPATIENT
Start: 2022-04-01 | End: 2022-04-08

## 2022-04-01 RX ORDER — PANTOPRAZOLE SODIUM 20 MG/1
40 TABLET, DELAYED RELEASE ORAL DAILY
Refills: 0 | Status: DISCONTINUED | OUTPATIENT
Start: 2022-04-01 | End: 2022-04-08

## 2022-04-01 RX ORDER — ENOXAPARIN SODIUM 100 MG/ML
40 INJECTION SUBCUTANEOUS EVERY 24 HOURS
Refills: 0 | Status: DISCONTINUED | OUTPATIENT
Start: 2022-04-01 | End: 2022-04-08

## 2022-04-01 RX ORDER — RALOXIFENE HYDROCHLORIDE 60 MG/1
60 TABLET, COATED ORAL DAILY
Refills: 0 | Status: DISCONTINUED | OUTPATIENT
Start: 2022-04-01 | End: 2022-04-08

## 2022-04-01 RX ADMIN — Medication 1 APPLICATION(S): at 05:35

## 2022-04-01 RX ADMIN — Medication 50 MILLIGRAM(S): at 21:36

## 2022-04-01 RX ADMIN — Medication 5 MILLIGRAM(S): at 21:36

## 2022-04-01 RX ADMIN — Medication 1 TABLET(S): at 12:07

## 2022-04-01 RX ADMIN — MIDODRINE HYDROCHLORIDE 10 MILLIGRAM(S): 2.5 TABLET ORAL at 05:34

## 2022-04-01 RX ADMIN — Medication 50 MILLIGRAM(S): at 05:34

## 2022-04-01 RX ADMIN — CHLORHEXIDINE GLUCONATE 1 APPLICATION(S): 213 SOLUTION TOPICAL at 05:34

## 2022-04-01 RX ADMIN — Medication 100 MILLIGRAM(S): at 05:33

## 2022-04-01 RX ADMIN — GABAPENTIN 300 MILLIGRAM(S): 400 CAPSULE ORAL at 05:34

## 2022-04-01 RX ADMIN — Medication 1 APPLICATION(S): at 20:09

## 2022-04-01 RX ADMIN — RALOXIFENE HYDROCHLORIDE 60 MILLIGRAM(S): 60 TABLET, COATED ORAL at 12:07

## 2022-04-01 RX ADMIN — MUPIROCIN 1 APPLICATION(S): 20 OINTMENT TOPICAL at 05:35

## 2022-04-01 RX ADMIN — MIDODRINE HYDROCHLORIDE 10 MILLIGRAM(S): 2.5 TABLET ORAL at 21:36

## 2022-04-01 RX ADMIN — Medication 100 MILLIGRAM(S): at 21:45

## 2022-04-01 RX ADMIN — MUPIROCIN 1 APPLICATION(S): 20 OINTMENT TOPICAL at 21:36

## 2022-04-01 RX ADMIN — PANTOPRAZOLE SODIUM 40 MILLIGRAM(S): 20 TABLET, DELAYED RELEASE ORAL at 12:07

## 2022-04-01 RX ADMIN — GABAPENTIN 300 MILLIGRAM(S): 400 CAPSULE ORAL at 21:35

## 2022-04-01 NOTE — CHART NOTE - NSCHARTNOTEFT_GEN_A_CORE
Podiatry;    Pt s/p excisional debridement of soft tissue and bone with 1st metatarsal head resection, R foot;  In OR, Clinical findings need further sx intervention, possible hallux amputation of right foot is not ruled out;  Dr. Everett will discuss w/ health proxy for possible hallux amputation;    Sx scheduled Tues 4/5; excisional debridement of soft tissue and bone with possible hallux amputation of right foot;  Request pre-lab optimization and OR stratification prior to sx Tues 4/5;  NPO @ MN Mon 4/4;     Podiatry [phone];

## 2022-04-01 NOTE — PROGRESS NOTE ADULT - SUBJECTIVE AND OBJECTIVE BOX
JERICHO TEA  56y  Female  afebrile, comfortable  going for surgery today    INTERVAL EVENTS:    T(C): 36.8 (04-01-22 @ 04:33), Max: 36.9 (03-31-22 @ 13:26)  HR: 75 (04-01-22 @ 04:33) (64 - 85)  BP: 115/63 (04-01-22 @ 04:33) (87/52 - 115/63)  RR: 16 (04-01-22 @ 04:33) (16 - 17)  SpO2: 94% (04-01-22 @ 04:33) (94% - 94%)  Wt(kg): --Vital Signs Last 24 Hrs  T(C): 36.8 (01 Apr 2022 04:33), Max: 36.9 (31 Mar 2022 13:26)  T(F): 98.2 (01 Apr 2022 04:33), Max: 98.4 (31 Mar 2022 13:26)  HR: 75 (01 Apr 2022 04:33) (64 - 85)  BP: 115/63 (01 Apr 2022 04:33) (87/52 - 115/63)  BP(mean): --  RR: 16 (01 Apr 2022 04:33) (16 - 17)  SpO2: 94% (01 Apr 2022 04:33) (94% - 94%)    PHYSICAL EXAM:  GENERAL: resting comfortably  NECK: Supple, No JVD, Normal thyroid  CHEST/LUNG: Clear; No crackles or wheezing  HEART: S1, S2, Regular rate and rhythm;   ABDOMEN: Soft, Nontender, Nondistended; Bowel sounds present  EXTREMITIES:chronic changes  SKIN: No rashes or lesions except foot and hip    LABS:                          14.6   6.64  )-----------( 308      ( 01 Apr 2022 07:20 )             43.7     04-01    144  |  103  |  13  ----------------------------<  103<H>  3.5   |  33<H>  |  0.8    Ca    8.5      01 Apr 2022 07:20  Mg     2.4     04-01    TPro  5.9<L>  /  Alb  3.0<L>  /  TBili  <0.2  /  DBili  x   /  AST  22  /  ALT  25  /  AlkPhos  96  04-01    PT/INR - ( 31 Mar 2022 20:00 )   PT: 11.80 sec;   INR: 1.03 ratio         PTT - ( 31 Mar 2022 16:00 )  PTT:28.1 sec          ceFAZolin   IVPB 2000 milliGRAM(s) IV Intermittent every 8 hours  chlorhexidine 4% Liquid 1 Application(s) Topical <User Schedule>  collagenase Ointment 1 Application(s) Topical two times a day  enoxaparin Injectable 40 milliGRAM(s) SubCutaneous every 24 hours  gabapentin Solution 300 milliGRAM(s) Oral every 12 hours  hydrocortisone sodium succinate Injectable 50 milliGRAM(s) IV Push every 8 hours  melatonin 5 milliGRAM(s) Oral at bedtime  midodrine. 10 milliGRAM(s) Oral every 8 hours  multivitamin 1 Tablet(s) Oral daily  mupirocin 2% Ointment 1 Application(s) Topical two times a day  pantoprazole   Suspension 40 milliGRAM(s) Oral daily  raloxifene 60 milliGRAM(s) Oral daily      RADIOLOGY & ADDITIONAL TESTS:    case discussed with the resident  Available consult notes reviewed    Assessment and plan:     cleared for surgery  nutritional consult

## 2022-04-01 NOTE — PROGRESS NOTE ADULT - SUBJECTIVE AND OBJECTIVE BOX
TEA ECHAVARRIA 56y Female  MRN#: [id number]   CODE STATUS: Full       SUBJECTIVE  Patient is a 56y old Female who presents with a chief complaint of Aspiration pneumonia (01 Apr 2022 08:38)  Currently admitted to medicine with the primary diagnosis of Septic shock    Today is hospital day 8d, and this morning she is resting comfortably in bed.   No overnight events.         OBJECTIVE  PAST MEDICAL & SURGICAL HISTORY  Down syndrome    Osteoporosis    Mild anemia    Neuropathy    S/P debridement  of R hip on 3/2/21      ALLERGIES:  No Known Allergies    MEDICATIONS:  STANDING MEDICATIONS  ceFAZolin   IVPB 2000 milliGRAM(s) IV Intermittent every 8 hours  chlorhexidine 4% Liquid 1 Application(s) Topical <User Schedule>  collagenase Ointment 1 Application(s) Topical two times a day  enoxaparin Injectable 40 milliGRAM(s) SubCutaneous every 24 hours  gabapentin Solution 300 milliGRAM(s) Oral every 12 hours  hydrocortisone sodium succinate Injectable 50 milliGRAM(s) IV Push every 8 hours  melatonin 5 milliGRAM(s) Oral at bedtime  midodrine. 10 milliGRAM(s) Oral every 8 hours  multivitamin 1 Tablet(s) Oral daily  mupirocin 2% Ointment 1 Application(s) Topical two times a day  pantoprazole   Suspension 40 milliGRAM(s) Oral daily  raloxifene 60 milliGRAM(s) Oral daily    PRN MEDICATIONS      VITAL SIGNS: Last 24 Hours  T(C): 36.7 (01 Apr 2022 09:35), Max: 36.9 (31 Mar 2022 13:26)  T(F): 98.1 (01 Apr 2022 09:35), Max: 98.4 (31 Mar 2022 13:26)  HR: 68 (01 Apr 2022 09:35) (64 - 85)  BP: 117/63 (01 Apr 2022 09:35) (87/52 - 117/63)  BP(mean): --  RR: 17 (01 Apr 2022 09:35) (16 - 17)  SpO2: 93% (01 Apr 2022 09:35) (93% - 94%)    LABS:                        14.6   6.64  )-----------( 308      ( 01 Apr 2022 07:20 )             43.7     04-01    144  |  103  |  13  ----------------------------<  103<H>  3.5   |  33<H>  |  0.8    Ca    8.5      01 Apr 2022 07:20  Mg     2.4     04-01    TPro  5.9<L>  /  Alb  3.0<L>  /  TBili  <0.2  /  DBili  x   /  AST  22  /  ALT  25  /  AlkPhos  96  04-01    PT/INR - ( 31 Mar 2022 20:00 )   PT: 11.80 sec;   INR: 1.03 ratio         PTT - ( 31 Mar 2022 16:00 )  PTT:28.1 sec      PHYSICAL EXAM:  GENERAL: NAD, well-developed, non verbal   HEENT:  Atraumatic, Normocephalic. conjunctiva and sclera clear, No JVD  PULMONARY: decreased breath sounds bilaterally; No wheeze  CARDIOVASCULAR: Regular rate and rhythm; No murmurs, rubs, or gallops  GASTROINTESTINAL: Soft, Nontender, Nondistended; Bowel sounds present  MUSCULOSKELETAL: LE contracted, No clubbing, cyanosis, or edema  NEUROLOGY: non-focal  SKIN: right foot wrapped in Kerlix         ASSESSMENT & PLAN  57 y/o female with a pmhx of down syndrome, non-verbal, hypothyroidism, osteoporosis presents after multiple episodes of vomiting admitted for septic shock.    #Septic shock s/p pressors POA (resolved)  #Acute hypoxic respiratory failure 2/2 Aspiration pneumonia/pneumonitis  #Acute cystitis  - MRSA positive  - CXR: Persistent left-sided opacities.  - F/u BCx and Urinary Cx. urine negative, no growth to date in blood  - F/u sputum culture. Strep/Legionella Ag both negative  - cont midodrine 10mg q8  - s/p 7 days Vancomycin and Cefepime     #Septic arthritis of Right Foot 1st MTP joint   - Right foot xray: Findings consistent with septic arthritis/osteomyelitis at the first MTP joint  - Arterial duplex: Normal arterial flow in the visualized arteries of bilateral lower extremities. Bilateral peroneal arteries were not visualized.  - ESR 40, CRP 16   - 3/28 R foot : ZACHARY, S lugdunensfranc  - podiatry recs: debridement today   - pt cleared by cardio for surgery   - cont with ancef 2g IV q 8hrs  - On discharge change to po Augmentin 875 mg q12h for 4 more weeks as per ID recs     #Sinus Bradycardia   - TTE EF 65%. mild TR, mild AS   - TSH 4.1, T4 8.4   - Avoid sinus/AV dayanara blocking agents .    #Osteoporosis-Continue Raloxifene  #Neuropathy- Continue Gabapentin  #Downs Syndrome- Non verbal, Wheelchair bound  #Functional quadriplegia - wheelchair/ bed bound     dvt ppx: lovenox   gi ppx: PPI   diet: pureed with mildly thick liquids, NPO for surgery   activity: bedrest   full code

## 2022-04-02 LAB
ALBUMIN SERPL ELPH-MCNC: 3 G/DL — LOW (ref 3.5–5.2)
ALP SERPL-CCNC: 104 U/L — SIGNIFICANT CHANGE UP (ref 30–115)
ALT FLD-CCNC: 21 U/L — SIGNIFICANT CHANGE UP (ref 0–41)
ANION GAP SERPL CALC-SCNC: 14 MMOL/L — SIGNIFICANT CHANGE UP (ref 7–14)
AST SERPL-CCNC: 26 U/L — SIGNIFICANT CHANGE UP (ref 0–41)
BASOPHILS # BLD AUTO: 0.02 K/UL — SIGNIFICANT CHANGE UP (ref 0–0.2)
BASOPHILS NFR BLD AUTO: 0.3 % — SIGNIFICANT CHANGE UP (ref 0–1)
BILIRUB SERPL-MCNC: 0.2 MG/DL — SIGNIFICANT CHANGE UP (ref 0.2–1.2)
BUN SERPL-MCNC: 9 MG/DL — LOW (ref 10–20)
CALCIUM SERPL-MCNC: 8.3 MG/DL — LOW (ref 8.5–10.1)
CHLORIDE SERPL-SCNC: 107 MMOL/L — SIGNIFICANT CHANGE UP (ref 98–110)
CO2 SERPL-SCNC: 24 MMOL/L — SIGNIFICANT CHANGE UP (ref 17–32)
CREAT SERPL-MCNC: 0.6 MG/DL — LOW (ref 0.7–1.5)
CULTURE RESULTS: SIGNIFICANT CHANGE UP
EGFR: 106 ML/MIN/1.73M2 — SIGNIFICANT CHANGE UP
EOSINOPHIL # BLD AUTO: 0.02 K/UL — SIGNIFICANT CHANGE UP (ref 0–0.7)
EOSINOPHIL NFR BLD AUTO: 0.3 % — SIGNIFICANT CHANGE UP (ref 0–8)
GLUCOSE SERPL-MCNC: 112 MG/DL — HIGH (ref 70–99)
GRAM STN FLD: SIGNIFICANT CHANGE UP
HCT VFR BLD CALC: 42.5 % — SIGNIFICANT CHANGE UP (ref 37–47)
HGB BLD-MCNC: 14.6 G/DL — SIGNIFICANT CHANGE UP (ref 12–16)
IMM GRANULOCYTES NFR BLD AUTO: 0.3 % — SIGNIFICANT CHANGE UP (ref 0.1–0.3)
LYMPHOCYTES # BLD AUTO: 1.29 K/UL — SIGNIFICANT CHANGE UP (ref 1.2–3.4)
LYMPHOCYTES # BLD AUTO: 21.5 % — SIGNIFICANT CHANGE UP (ref 20.5–51.1)
MAGNESIUM SERPL-MCNC: 2.1 MG/DL — SIGNIFICANT CHANGE UP (ref 1.8–2.4)
MCHC RBC-ENTMCNC: 32.5 PG — HIGH (ref 27–31)
MCHC RBC-ENTMCNC: 34.4 G/DL — SIGNIFICANT CHANGE UP (ref 32–37)
MCV RBC AUTO: 94.7 FL — SIGNIFICANT CHANGE UP (ref 81–99)
MONOCYTES # BLD AUTO: 0.27 K/UL — SIGNIFICANT CHANGE UP (ref 0.1–0.6)
MONOCYTES NFR BLD AUTO: 4.5 % — SIGNIFICANT CHANGE UP (ref 1.7–9.3)
NEUTROPHILS # BLD AUTO: 4.39 K/UL — SIGNIFICANT CHANGE UP (ref 1.4–6.5)
NEUTROPHILS NFR BLD AUTO: 73.1 % — SIGNIFICANT CHANGE UP (ref 42.2–75.2)
NRBC # BLD: 0 /100 WBCS — SIGNIFICANT CHANGE UP (ref 0–0)
ORGANISM # SPEC MICROSCOPIC CNT: SIGNIFICANT CHANGE UP
PLATELET # BLD AUTO: 316 K/UL — SIGNIFICANT CHANGE UP (ref 130–400)
POTASSIUM SERPL-MCNC: 4.4 MMOL/L — SIGNIFICANT CHANGE UP (ref 3.5–5)
POTASSIUM SERPL-SCNC: 4.4 MMOL/L — SIGNIFICANT CHANGE UP (ref 3.5–5)
PROT SERPL-MCNC: 5.8 G/DL — LOW (ref 6–8)
RBC # BLD: 4.49 M/UL — SIGNIFICANT CHANGE UP (ref 4.2–5.4)
RBC # FLD: 13.9 % — SIGNIFICANT CHANGE UP (ref 11.5–14.5)
SODIUM SERPL-SCNC: 145 MMOL/L — SIGNIFICANT CHANGE UP (ref 135–146)
SPECIMEN SOURCE: SIGNIFICANT CHANGE UP
SPECIMEN SOURCE: SIGNIFICANT CHANGE UP
WBC # BLD: 6.01 K/UL — SIGNIFICANT CHANGE UP (ref 4.8–10.8)
WBC # FLD AUTO: 6.01 K/UL — SIGNIFICANT CHANGE UP (ref 4.8–10.8)

## 2022-04-02 RX ADMIN — Medication 100 MILLIGRAM(S): at 06:34

## 2022-04-02 RX ADMIN — MUPIROCIN 1 APPLICATION(S): 20 OINTMENT TOPICAL at 18:31

## 2022-04-02 RX ADMIN — GABAPENTIN 300 MILLIGRAM(S): 400 CAPSULE ORAL at 18:31

## 2022-04-02 RX ADMIN — GABAPENTIN 300 MILLIGRAM(S): 400 CAPSULE ORAL at 06:38

## 2022-04-02 RX ADMIN — ENOXAPARIN SODIUM 40 MILLIGRAM(S): 100 INJECTION SUBCUTANEOUS at 12:39

## 2022-04-02 RX ADMIN — RALOXIFENE HYDROCHLORIDE 60 MILLIGRAM(S): 60 TABLET, COATED ORAL at 12:40

## 2022-04-02 RX ADMIN — Medication 50 MILLIGRAM(S): at 14:04

## 2022-04-02 RX ADMIN — Medication 1 APPLICATION(S): at 06:38

## 2022-04-02 RX ADMIN — MUPIROCIN 1 APPLICATION(S): 20 OINTMENT TOPICAL at 06:35

## 2022-04-02 RX ADMIN — Medication 5 MILLIGRAM(S): at 21:58

## 2022-04-02 RX ADMIN — Medication 1 APPLICATION(S): at 18:08

## 2022-04-02 RX ADMIN — Medication 1 TABLET(S): at 12:40

## 2022-04-02 RX ADMIN — MIDODRINE HYDROCHLORIDE 10 MILLIGRAM(S): 2.5 TABLET ORAL at 21:59

## 2022-04-02 RX ADMIN — MIDODRINE HYDROCHLORIDE 10 MILLIGRAM(S): 2.5 TABLET ORAL at 06:35

## 2022-04-02 RX ADMIN — PANTOPRAZOLE SODIUM 40 MILLIGRAM(S): 20 TABLET, DELAYED RELEASE ORAL at 12:38

## 2022-04-02 RX ADMIN — Medication 50 MILLIGRAM(S): at 06:35

## 2022-04-02 RX ADMIN — MIDODRINE HYDROCHLORIDE 10 MILLIGRAM(S): 2.5 TABLET ORAL at 14:05

## 2022-04-02 RX ADMIN — Medication 100 MILLIGRAM(S): at 21:58

## 2022-04-02 RX ADMIN — Medication 100 MILLIGRAM(S): at 14:12

## 2022-04-02 RX ADMIN — Medication 50 MILLIGRAM(S): at 21:59

## 2022-04-02 NOTE — PROGRESS NOTE ADULT - SUBJECTIVE AND OBJECTIVE BOX
TEA ECHAVARRIA  56y  Female  tolerated surgery, but needs more extensive excisional debridement  afebrile  bp was low- now better  labs stable  eating well    INTERVAL EVENTS:    T(C): 36.1 (04-02-22 @ 05:48), Max: 36.9 (04-01-22 @ 17:50)  HR: 76 (04-02-22 @ 05:48) (58 - 76)  BP: 95/55 (04-02-22 @ 14:33) (80/42 - 120/76)  RR: 18 (04-02-22 @ 05:48) (12 - 19)  SpO2: 97% (04-01-22 @ 19:00) (96% - 99%)  Wt(kg): --Vital Signs Last 24 Hrs  T(C): 36.1 (02 Apr 2022 05:48), Max: 36.9 (01 Apr 2022 17:50)  T(F): 96.9 (02 Apr 2022 05:48), Max: 98.5 (01 Apr 2022 17:50)  HR: 76 (02 Apr 2022 05:48) (58 - 76)  BP: 95/55 (02 Apr 2022 14:33) (80/42 - 120/76)  BP(mean): --  RR: 18 (02 Apr 2022 05:48) (12 - 19)  SpO2: 97% (01 Apr 2022 19:00) (96% - 99%)    PHYSICAL EXAM:  GENERAL: resting comfortably  NECK: Supple, No JVD, Normal thyroid  CHEST/LUNG: Clear; No crackles or wheezing  HEART: S1, S2, Regular rate and rhythm;   ABDOMEN: Soft, Nontender, Nondistended; Bowel sounds present  EXTREMITIES: rt foot wrapped  SKIN: rt hip- healing    LABS:                          14.6   6.01  )-----------( 316      ( 02 Apr 2022 04:30 )             42.5     04-02    145  |  107  |  9<L>  ----------------------------<  112<H>  4.4   |  24  |  0.6<L>    Ca    8.3<L>      02 Apr 2022 04:30  Mg     2.1     04-02    TPro  5.8<L>  /  Alb  3.0<L>  /  TBili  0.2  /  DBili  x   /  AST  26  /  ALT  21  /  AlkPhos  104  04-02    PT/INR - ( 31 Mar 2022 20:00 )   PT: 11.80 sec;   INR: 1.03 ratio               Culture - Tissue with Gram Stain (collected 01 Apr 2022 17:23)  Source: .Tissue None  Gram Stain (02 Apr 2022 11:38):    No polymorphonuclear leukocytes seen per low power field    No organisms seen seen per oil power field          ceFAZolin   IVPB 2000 milliGRAM(s) IV Intermittent every 8 hours  collagenase Ointment 1 Application(s) Topical two times a day  enoxaparin Injectable 40 milliGRAM(s) SubCutaneous every 24 hours  gabapentin Solution 300 milliGRAM(s) Oral every 12 hours  hydrocortisone sodium succinate Injectable 50 milliGRAM(s) IV Push every 8 hours  melatonin 5 milliGRAM(s) Oral at bedtime  midodrine. 10 milliGRAM(s) Oral every 8 hours  multivitamin 1 Tablet(s) Oral daily  mupirocin 2% Ointment 1 Application(s) Topical two times a day  pantoprazole   Suspension 40 milliGRAM(s) Oral daily  raloxifene 60 milliGRAM(s) Oral daily      RADIOLOGY & ADDITIONAL TESTS:    case discussed with the resident  Available consult notes reviewed    Assessment and plan:     podiatry f/u appreciated  tolerates antibiotics  in view of need of prolonged antibiotic use would start acidophilus

## 2022-04-02 NOTE — PROGRESS NOTE ADULT - SUBJECTIVE AND OBJECTIVE BOX
Podiatry Progress Note    Subjective:  TEA ECHAVARRIA is a  56y Female.   Seen bedside.   Patient is a 56y old  Female who presents with a chief complaint of Aspiration pneumonia (02 Apr 2022 10:23)      Past Medical History and Surgical History  PAST MEDICAL & SURGICAL HISTORY:  Down syndrome    Osteoporosis    Mild anemia    Neuropathy    S/P debridement  of R hip on 3/2/21         Objective:  Vital Signs Last 24 Hrs  T(C): 36.1 (02 Apr 2022 05:48), Max: 36.9 (01 Apr 2022 17:50)  T(F): 96.9 (02 Apr 2022 05:48), Max: 98.5 (01 Apr 2022 17:50)  HR: 76 (02 Apr 2022 05:48) (58 - 76)  BP: 120/76 (02 Apr 2022 05:48) (80/42 - 120/76)  BP(mean): --  RR: 18 (02 Apr 2022 05:48) (12 - 19)  SpO2: 97% (01 Apr 2022 19:00) (96% - 99%)                        14.6   6.01  )-----------( x        ( 02 Apr 2022 04:30 )             42.5                 04-02    145  |  107  |  9<L>  ----------------------------<  112<H>  4.4   |  24  |  0.6<L>    Ca    8.3<L>      02 Apr 2022 04:30  Mg     2.1     04-02    TPro  5.8<L>  /  Alb  3.0<L>  /  TBili  0.2  /  DBili  x   /  AST  26  /  ALT  21  /  AlkPhos  104  04-02      Culture - Tissue with Gram Stain (collected 04-01-22 @ 17:23)  Source: .Tissue None  Gram Stain (04-02-22 @ 11:38):    No polymorphonuclear leukocytes seen per low power field    No organisms seen seen per oil power field         ------------------------------    Physical Exam - Lower Extremity Focused:   #Right Lower Extremity  Derm:   (Right foot 1st metatarsal head resection DOS 4/1/22)    Vascular: DP and PT Pulses Palpable; Foot is Warm to Warm to the touch   Neuro: Not assessable 2/2 Mental Status  MSK: Contracted; Further examination not assessable 2/2 Mental Status       Assessment:  Right Forefoot Plantar Ulcer (1st Submetatarsal head) - Probes to Bone    Plan:  Chart reviewed and Patient evaluated.   Wound Flushed w/ NS; Wound Dressed w/ Xeroform / DSD / Kerlix; q24  Local Wound Care; As Stated Above   A-Duplex B/L (3/29/22);   -Normal arterial flow in the visualized arteries of bilateral lower extremities.  -Bilateral peroneal arteries were not visualized.  -Difficult study due to patient positioning.  ID consult active; FXR-R shows septic arthritis/osteomyelitis at the first MTP join of right foot; will f/u recs;  Sx scheduled Fri 4/5 @ 4:30PM; excisional debridement of soft tissue and bone    R foot;  Cardio Clearance appreciated; Moderate risk for MACE;  NPO @ MN Monday 4/4  Please order T&S and COAG studies 4/4.   Please optimize pt lab values prior to OR.   Weight bearing status; WBAT BL feet;   Discussed plan w/ Dr. Everett;     Podiatry      Podiatry Progress Note    Subjective:  TEA ECHAVARRIA is a  56y Female.   Seen bedside.   Patient is a 56y old  Female who presents with a chief complaint of Aspiration pneumonia (02 Apr 2022 10:23)      Past Medical History and Surgical History  PAST MEDICAL & SURGICAL HISTORY:  Down syndrome    Osteoporosis    Mild anemia    Neuropathy    S/P debridement  of R hip on 3/2/21         Objective:  Vital Signs Last 24 Hrs  T(C): 36.1 (02 Apr 2022 05:48), Max: 36.9 (01 Apr 2022 17:50)  T(F): 96.9 (02 Apr 2022 05:48), Max: 98.5 (01 Apr 2022 17:50)  HR: 76 (02 Apr 2022 05:48) (58 - 76)  BP: 120/76 (02 Apr 2022 05:48) (80/42 - 120/76)  BP(mean): --  RR: 18 (02 Apr 2022 05:48) (12 - 19)  SpO2: 97% (01 Apr 2022 19:00) (96% - 99%)                        14.6   6.01  )-----------( x        ( 02 Apr 2022 04:30 )             42.5                 04-02    145  |  107  |  9<L>  ----------------------------<  112<H>  4.4   |  24  |  0.6<L>    Ca    8.3<L>      02 Apr 2022 04:30  Mg     2.1     04-02    TPro  5.8<L>  /  Alb  3.0<L>  /  TBili  0.2  /  DBili  x   /  AST  26  /  ALT  21  /  AlkPhos  104  04-02      Culture - Tissue with Gram Stain (collected 04-01-22 @ 17:23)  Source: .Tissue None  Gram Stain (04-02-22 @ 11:38):    No polymorphonuclear leukocytes seen per low power field    No organisms seen seen per oil power field         ------------------------------    Physical Exam - Lower Extremity Focused:   #Right Lower Extremity  Derm:   (Right foot 1st metatarsal head resection DOS 4/1/22)    Vascular: DP and PT Pulses Palpable; Foot is Warm to Warm to the touch   Neuro: Not assessable 2/2 Mental Status  MSK: Contracted; Further examination not assessable 2/2 Mental Status       Assessment:  Right Forefoot Plantar Ulcer (1st Submetatarsal head) - Probes to Bone    Plan:  Chart reviewed and Patient evaluated.   Wound Flushed w/ NS; Wound Dressed w/ Xeroform / DSD / Kerlix; q24  Local Wound Care; As Stated Above   A-Duplex B/L (3/29/22);   -Normal arterial flow in the visualized arteries of bilateral lower extremities.  -Bilateral peroneal arteries were not visualized.  -Difficult study due to patient positioning.  ID consult active; FXR-R shows septic arthritis/osteomyelitis at the first MTP join of right foot; will f/u recs;  Sx scheduled  Tuesday 4/5   excisional debridement of soft tissue and bone  w/ possible hallux amputation   R foot;  Cardio Clearance appreciated; Moderate risk for MACE;  NPO @ [facility] Monday 4/4  Please order T&S and COAG studies 4/4.   Please optimize pt lab values prior to OR.   Weight bearing status; WBAT BL feet;   Discussed plan w/ Dr. Everett;     Podiatry

## 2022-04-02 NOTE — PROGRESS NOTE ADULT - SUBJECTIVE AND OBJECTIVE BOX
TEA ECHAVARRIA 56y Female  MRN#: [id number]   CODE STATUS: full       SUBJECTIVE  Patient is a 56y old Female who presents with a chief complaint of Aspiration pneumonia (01 Apr 2022 09:42)  Currently admitted to medicine with the primary diagnosis of Septic shock    Today is hospital day 9d, and this morning she is resting comfortably in bed. Patient had debridement yesterday by podiatry. Plan for possible amputation on Tuesday.   No overnight events.         OBJECTIVE  PAST MEDICAL & SURGICAL HISTORY  Down syndrome    Osteoporosis    Mild anemia    Neuropathy    S/P debridement  of R hip on 3/2/21      ALLERGIES:  No Known Allergies    MEDICATIONS:  STANDING MEDICATIONS  ceFAZolin   IVPB 2000 milliGRAM(s) IV Intermittent every 8 hours  collagenase Ointment 1 Application(s) Topical two times a day  enoxaparin Injectable 40 milliGRAM(s) SubCutaneous every 24 hours  gabapentin Solution 300 milliGRAM(s) Oral every 12 hours  hydrocortisone sodium succinate Injectable 50 milliGRAM(s) IV Push every 8 hours  melatonin 5 milliGRAM(s) Oral at bedtime  midodrine. 10 milliGRAM(s) Oral every 8 hours  multivitamin 1 Tablet(s) Oral daily  mupirocin 2% Ointment 1 Application(s) Topical two times a day  pantoprazole   Suspension 40 milliGRAM(s) Oral daily  raloxifene 60 milliGRAM(s) Oral daily    PRN MEDICATIONS      VITAL SIGNS: Last 24 Hours  T(C): 36.1 (02 Apr 2022 05:48), Max: 36.9 (01 Apr 2022 17:50)  T(F): 96.9 (02 Apr 2022 05:48), Max: 98.5 (01 Apr 2022 17:50)  HR: 76 (02 Apr 2022 05:48) (58 - 79)  BP: 120/76 (02 Apr 2022 05:48) (80/42 - 120/76)  BP(mean): --  RR: 18 (02 Apr 2022 05:48) (12 - 19)  SpO2: 97% (01 Apr 2022 19:00) (96% - 99%)    LABS:                        14.6   6.01  )-----------( x        ( 02 Apr 2022 04:30 )             42.5     04-02    145  |  107  |  9<L>  ----------------------------<  112<H>  4.4   |  24  |  0.6<L>    Ca    8.3<L>      02 Apr 2022 04:30  Mg     2.1     04-02    TPro  5.8<L>  /  Alb  3.0<L>  /  TBili  0.2  /  DBili  x   /  AST  26  /  ALT  21  /  AlkPhos  104  04-02    PT/INR - ( 31 Mar 2022 20:00 )   PT: 11.80 sec;   INR: 1.03 ratio         PTT - ( 31 Mar 2022 16:00 )  PTT:28.1 sec      PHYSICAL EXAM:  GENERAL: NAD, well-developed, non verbal   HEENT:  Atraumatic, Normocephalic. conjunctiva and sclera clear, No JVD  PULMONARY: decreased breath sounds bilaterally; No wheeze  CARDIOVASCULAR: Regular rate and rhythm; No murmurs, rubs, or gallops  GASTROINTESTINAL: Soft, Nontender, Nondistended; Bowel sounds present  MUSCULOSKELETAL: LE contracted, No clubbing, cyanosis, or edema  NEUROLOGY: non-focal  SKIN: right foot wrapped in Kerlix       ASSESSMENT & PLAN  57 y/o female with a pmhx of down syndrome, non-verbal, hypothyroidism, osteoporosis presents after multiple episodes of vomiting admitted for septic shock.    #Septic shock s/p pressors POA (resolved)  #Acute hypoxic respiratory failure 2/2 Aspiration pneumonia/pneumonitis  #Acute cystitis  - MRSA positive  - CXR: Persistent left-sided opacities.  - F/u BCx and Urinary Cx. urine negative, no growth to date in blood  - F/u sputum culture. Strep/Legionella Ag both negative  - cont midodrine 10mg q8  - s/p 7 days Vancomycin and Cefepime     #Septic arthritis of Right Foot 1st MTP joint   - Right foot xray: Findings consistent with septic arthritis/osteomyelitis at the first MTP joint  - Arterial duplex: Normal arterial flow in the visualized arteries of bilateral lower extremities. Bilateral peroneal arteries were not visualized.  - ESR 40, CRP 16   - 3/28 R foot : ZACHARY, S lugdunensis  - podiatry following: s/p debridement 4/1, plan for possible amputation of 1st hallux Tuesday 4/5   - pt cleared by cardio for surgery   - cont with ancef 2g IV q 8hrs as per ID   - On discharge change to po Augmentin 875 mg q12h for 4 more weeks as per ID recs     #Sinus Bradycardia   - TTE EF 65%. mild TR, mild AS   - TSH 4.1, T4 8.4   - Avoid sinus/AV dayanara blocking agents .    #Osteoporosis-Continue Raloxifene  #Neuropathy- Continue Gabapentin  #Downs Syndrome- Non verbal, Wheelchair bound  #Functional quadriplegia - wheelchair/ bed bound     dvt ppx: lovenox   gi ppx: PPI   diet: pureed with mildly thick liquids  activity: bedrest   full code     Dispo: possible surgery tues 4/5 with podiatry

## 2022-04-03 LAB
ALBUMIN SERPL ELPH-MCNC: 2.9 G/DL — LOW (ref 3.5–5.2)
ALP SERPL-CCNC: 97 U/L — SIGNIFICANT CHANGE UP (ref 30–115)
ALT FLD-CCNC: 13 U/L — SIGNIFICANT CHANGE UP (ref 0–41)
ANION GAP SERPL CALC-SCNC: 10 MMOL/L — SIGNIFICANT CHANGE UP (ref 7–14)
AST SERPL-CCNC: 14 U/L — SIGNIFICANT CHANGE UP (ref 0–41)
BASOPHILS # BLD AUTO: 0.04 K/UL — SIGNIFICANT CHANGE UP (ref 0–0.2)
BASOPHILS NFR BLD AUTO: 0.7 % — SIGNIFICANT CHANGE UP (ref 0–1)
BILIRUB SERPL-MCNC: <0.2 MG/DL — SIGNIFICANT CHANGE UP (ref 0.2–1.2)
BUN SERPL-MCNC: 10 MG/DL — SIGNIFICANT CHANGE UP (ref 10–20)
CALCIUM SERPL-MCNC: 8.3 MG/DL — LOW (ref 8.5–10.1)
CHLORIDE SERPL-SCNC: 105 MMOL/L — SIGNIFICANT CHANGE UP (ref 98–110)
CO2 SERPL-SCNC: 29 MMOL/L — SIGNIFICANT CHANGE UP (ref 17–32)
CREAT SERPL-MCNC: 0.6 MG/DL — LOW (ref 0.7–1.5)
EGFR: 106 ML/MIN/1.73M2 — SIGNIFICANT CHANGE UP
EOSINOPHIL # BLD AUTO: 0 K/UL — SIGNIFICANT CHANGE UP (ref 0–0.7)
EOSINOPHIL NFR BLD AUTO: 0 % — SIGNIFICANT CHANGE UP (ref 0–8)
GLUCOSE SERPL-MCNC: 140 MG/DL — HIGH (ref 70–99)
HCT VFR BLD CALC: 40.4 % — SIGNIFICANT CHANGE UP (ref 37–47)
HGB BLD-MCNC: 13.6 G/DL — SIGNIFICANT CHANGE UP (ref 12–16)
IMM GRANULOCYTES NFR BLD AUTO: 0.2 % — SIGNIFICANT CHANGE UP (ref 0.1–0.3)
LYMPHOCYTES # BLD AUTO: 1.45 K/UL — SIGNIFICANT CHANGE UP (ref 1.2–3.4)
LYMPHOCYTES # BLD AUTO: 25.6 % — SIGNIFICANT CHANGE UP (ref 20.5–51.1)
MAGNESIUM SERPL-MCNC: 2.3 MG/DL — SIGNIFICANT CHANGE UP (ref 1.8–2.4)
MCHC RBC-ENTMCNC: 32 PG — HIGH (ref 27–31)
MCHC RBC-ENTMCNC: 33.7 G/DL — SIGNIFICANT CHANGE UP (ref 32–37)
MCV RBC AUTO: 95.1 FL — SIGNIFICANT CHANGE UP (ref 81–99)
MONOCYTES # BLD AUTO: 0.26 K/UL — SIGNIFICANT CHANGE UP (ref 0.1–0.6)
MONOCYTES NFR BLD AUTO: 4.6 % — SIGNIFICANT CHANGE UP (ref 1.7–9.3)
NEUTROPHILS # BLD AUTO: 3.91 K/UL — SIGNIFICANT CHANGE UP (ref 1.4–6.5)
NEUTROPHILS NFR BLD AUTO: 68.9 % — SIGNIFICANT CHANGE UP (ref 42.2–75.2)
NRBC # BLD: 0 /100 WBCS — SIGNIFICANT CHANGE UP (ref 0–0)
PLATELET # BLD AUTO: 333 K/UL — SIGNIFICANT CHANGE UP (ref 130–400)
POTASSIUM SERPL-MCNC: 3.6 MMOL/L — SIGNIFICANT CHANGE UP (ref 3.5–5)
POTASSIUM SERPL-SCNC: 3.6 MMOL/L — SIGNIFICANT CHANGE UP (ref 3.5–5)
PROT SERPL-MCNC: 5.8 G/DL — LOW (ref 6–8)
RBC # BLD: 4.25 M/UL — SIGNIFICANT CHANGE UP (ref 4.2–5.4)
RBC # FLD: 14 % — SIGNIFICANT CHANGE UP (ref 11.5–14.5)
SODIUM SERPL-SCNC: 144 MMOL/L — SIGNIFICANT CHANGE UP (ref 135–146)
WBC # BLD: 5.67 K/UL — SIGNIFICANT CHANGE UP (ref 4.8–10.8)
WBC # FLD AUTO: 5.67 K/UL — SIGNIFICANT CHANGE UP (ref 4.8–10.8)

## 2022-04-03 RX ADMIN — Medication 1 APPLICATION(S): at 06:04

## 2022-04-03 RX ADMIN — MIDODRINE HYDROCHLORIDE 10 MILLIGRAM(S): 2.5 TABLET ORAL at 06:05

## 2022-04-03 RX ADMIN — PANTOPRAZOLE SODIUM 40 MILLIGRAM(S): 20 TABLET, DELAYED RELEASE ORAL at 11:48

## 2022-04-03 RX ADMIN — MUPIROCIN 1 APPLICATION(S): 20 OINTMENT TOPICAL at 06:06

## 2022-04-03 RX ADMIN — GABAPENTIN 300 MILLIGRAM(S): 400 CAPSULE ORAL at 17:21

## 2022-04-03 RX ADMIN — RALOXIFENE HYDROCHLORIDE 60 MILLIGRAM(S): 60 TABLET, COATED ORAL at 11:48

## 2022-04-03 RX ADMIN — MIDODRINE HYDROCHLORIDE 10 MILLIGRAM(S): 2.5 TABLET ORAL at 21:54

## 2022-04-03 RX ADMIN — Medication 100 MILLIGRAM(S): at 06:03

## 2022-04-03 RX ADMIN — Medication 50 MILLIGRAM(S): at 13:02

## 2022-04-03 RX ADMIN — Medication 50 MILLIGRAM(S): at 06:05

## 2022-04-03 RX ADMIN — Medication 100 MILLIGRAM(S): at 21:54

## 2022-04-03 RX ADMIN — Medication 1 TABLET(S): at 11:48

## 2022-04-03 RX ADMIN — Medication 50 MILLIGRAM(S): at 21:55

## 2022-04-03 RX ADMIN — Medication 100 MILLIGRAM(S): at 13:02

## 2022-04-03 RX ADMIN — ENOXAPARIN SODIUM 40 MILLIGRAM(S): 100 INJECTION SUBCUTANEOUS at 11:48

## 2022-04-03 RX ADMIN — GABAPENTIN 300 MILLIGRAM(S): 400 CAPSULE ORAL at 06:05

## 2022-04-03 RX ADMIN — MUPIROCIN 1 APPLICATION(S): 20 OINTMENT TOPICAL at 17:21

## 2022-04-03 RX ADMIN — Medication 5 MILLIGRAM(S): at 21:54

## 2022-04-03 RX ADMIN — MIDODRINE HYDROCHLORIDE 10 MILLIGRAM(S): 2.5 TABLET ORAL at 13:02

## 2022-04-03 NOTE — PROGRESS NOTE ADULT - SUBJECTIVE AND OBJECTIVE BOX
TEA ECHAVARRIA  56y  Female    afebrile  comfortable  bp runs low- her usual  eating well  no diarrhea  INTERVAL EVENTS:    T(C): 36 (04-03-22 @ 04:49), Max: 36.7 (04-02-22 @ 20:21)  HR: 67 (04-03-22 @ 08:00) (59 - 70)  BP: 98/54 (04-03-22 @ 04:49) (95/55 - 115/69)  RR: 17 (04-03-22 @ 08:00) (17 - 18)  SpO2: 92% (04-03-22 @ 08:00) (92% - 92%)  Wt(kg): --Vital Signs Last 24 Hrs  T(C): 36 (03 Apr 2022 04:49), Max: 36.7 (02 Apr 2022 20:21)  T(F): 96.8 (03 Apr 2022 04:49), Max: 98 (02 Apr 2022 20:21)  HR: 67 (03 Apr 2022 08:00) (59 - 70)  BP: 98/54 (03 Apr 2022 04:49) (95/55 - 115/69)  BP(mean): 71 (03 Apr 2022 04:49) (71 - 71)  RR: 17 (03 Apr 2022 08:00) (17 - 18)  SpO2: 92% (03 Apr 2022 08:00) (92% - 92%)    PHYSICAL EXAM:  GENERAL: resting comfortably  NECK: Supple, No JVD, Normal thyroid  CHEST/LUNG: Clear; No crackles or wheezing  HEART: S1, S2, Regular rate and rhythm;   ABDOMEN: Soft, Nontender, Nondistended; Bowel sounds present  EXTREMITIES: rt foot lesions  SKIN: chronic lesions    LABS:                          13.6   5.67  )-----------( 333      ( 03 Apr 2022 07:30 )             40.4     04-03    144  |  105  |  10  ----------------------------<  140<H>  3.6   |  29  |  0.6<L>    Ca    8.3<L>      03 Apr 2022 07:30  Mg     2.3     04-03    TPro  5.8<L>  /  Alb  2.9<L>  /  TBili  <0.2  /  DBili  x   /  AST  14  /  ALT  13  /  AlkPhos  97  04-03          Culture - Tissue with Gram Stain (collected 01 Apr 2022 17:23)  Source: .Tissue None  Gram Stain (02 Apr 2022 11:38):    No polymorphonuclear leukocytes seen per low power field    No organisms seen seen per oil power field          ceFAZolin   IVPB 2000 milliGRAM(s) IV Intermittent every 8 hours  collagenase Ointment 1 Application(s) Topical two times a day  enoxaparin Injectable 40 milliGRAM(s) SubCutaneous every 24 hours  gabapentin Solution 300 milliGRAM(s) Oral every 12 hours  hydrocortisone sodium succinate Injectable 50 milliGRAM(s) IV Push every 8 hours  melatonin 5 milliGRAM(s) Oral at bedtime  midodrine. 10 milliGRAM(s) Oral every 8 hours  multivitamin 1 Tablet(s) Oral daily  mupirocin 2% Ointment 1 Application(s) Topical two times a day  pantoprazole   Suspension 40 milliGRAM(s) Oral daily  raloxifene 60 milliGRAM(s) Oral daily      RADIOLOGY & ADDITIONAL TESTS:    case discussed with the resident  Available consult notes reviewed    Assessment and plan:     needs more surgery- will discuss w cab for consent  medically stable for further surgical care  cont antibiotics  probiotics

## 2022-04-04 LAB
-  AMPICILLIN/SULBACTAM: SIGNIFICANT CHANGE UP
-  CEFAZOLIN: SIGNIFICANT CHANGE UP
-  CLINDAMYCIN: SIGNIFICANT CHANGE UP
-  ERYTHROMYCIN: SIGNIFICANT CHANGE UP
-  GENTAMICIN: SIGNIFICANT CHANGE UP
-  OXACILLIN: SIGNIFICANT CHANGE UP
-  RIFAMPIN: SIGNIFICANT CHANGE UP
-  TETRACYCLINE: SIGNIFICANT CHANGE UP
-  TRIMETHOPRIM/SULFAMETHOXAZOLE: SIGNIFICANT CHANGE UP
-  VANCOMYCIN: SIGNIFICANT CHANGE UP
ALBUMIN SERPL ELPH-MCNC: 3.1 G/DL — LOW (ref 3.5–5.2)
ALBUMIN SERPL ELPH-MCNC: 3.3 G/DL — LOW (ref 3.5–5.2)
ALP SERPL-CCNC: 111 U/L — SIGNIFICANT CHANGE UP (ref 30–115)
ALP SERPL-CCNC: 99 U/L — SIGNIFICANT CHANGE UP (ref 30–115)
ALT FLD-CCNC: 12 U/L — SIGNIFICANT CHANGE UP (ref 0–41)
ALT FLD-CCNC: 16 U/L — SIGNIFICANT CHANGE UP (ref 0–41)
ANION GAP SERPL CALC-SCNC: 12 MMOL/L — SIGNIFICANT CHANGE UP (ref 7–14)
ANION GAP SERPL CALC-SCNC: 15 MMOL/L — HIGH (ref 7–14)
APTT BLD: 28.8 SEC — SIGNIFICANT CHANGE UP (ref 27–39.2)
AST SERPL-CCNC: 15 U/L — SIGNIFICANT CHANGE UP (ref 0–41)
AST SERPL-CCNC: 18 U/L — SIGNIFICANT CHANGE UP (ref 0–41)
BASOPHILS # BLD AUTO: 0.04 K/UL — SIGNIFICANT CHANGE UP (ref 0–0.2)
BASOPHILS NFR BLD AUTO: 0.6 % — SIGNIFICANT CHANGE UP (ref 0–1)
BILIRUB SERPL-MCNC: <0.2 MG/DL — SIGNIFICANT CHANGE UP (ref 0.2–1.2)
BILIRUB SERPL-MCNC: <0.2 MG/DL — SIGNIFICANT CHANGE UP (ref 0.2–1.2)
BUN SERPL-MCNC: 10 MG/DL — SIGNIFICANT CHANGE UP (ref 10–20)
BUN SERPL-MCNC: 8 MG/DL — LOW (ref 10–20)
CALCIUM SERPL-MCNC: 8.3 MG/DL — LOW (ref 8.5–10.1)
CALCIUM SERPL-MCNC: 8.4 MG/DL — LOW (ref 8.5–10.1)
CHLORIDE SERPL-SCNC: 102 MMOL/L — SIGNIFICANT CHANGE UP (ref 98–110)
CHLORIDE SERPL-SCNC: 107 MMOL/L — SIGNIFICANT CHANGE UP (ref 98–110)
CO2 SERPL-SCNC: 26 MMOL/L — SIGNIFICANT CHANGE UP (ref 17–32)
CO2 SERPL-SCNC: 27 MMOL/L — SIGNIFICANT CHANGE UP (ref 17–32)
CREAT SERPL-MCNC: 0.6 MG/DL — LOW (ref 0.7–1.5)
CREAT SERPL-MCNC: 0.8 MG/DL — SIGNIFICANT CHANGE UP (ref 0.7–1.5)
EGFR: 106 ML/MIN/1.73M2 — SIGNIFICANT CHANGE UP
EGFR: 87 ML/MIN/1.73M2 — SIGNIFICANT CHANGE UP
EOSINOPHIL # BLD AUTO: 0.01 K/UL — SIGNIFICANT CHANGE UP (ref 0–0.7)
EOSINOPHIL NFR BLD AUTO: 0.2 % — SIGNIFICANT CHANGE UP (ref 0–8)
GLUCOSE SERPL-MCNC: 151 MG/DL — HIGH (ref 70–99)
GLUCOSE SERPL-MCNC: 216 MG/DL — HIGH (ref 70–99)
HCT VFR BLD CALC: 39.7 % — SIGNIFICANT CHANGE UP (ref 37–47)
HCT VFR BLD CALC: 41.2 % — SIGNIFICANT CHANGE UP (ref 37–47)
HGB BLD-MCNC: 13.7 G/DL — SIGNIFICANT CHANGE UP (ref 12–16)
HGB BLD-MCNC: 14.3 G/DL — SIGNIFICANT CHANGE UP (ref 12–16)
IMM GRANULOCYTES NFR BLD AUTO: 0.2 % — SIGNIFICANT CHANGE UP (ref 0.1–0.3)
INR BLD: 1.01 RATIO — SIGNIFICANT CHANGE UP (ref 0.65–1.3)
LYMPHOCYTES # BLD AUTO: 1.86 K/UL — SIGNIFICANT CHANGE UP (ref 1.2–3.4)
LYMPHOCYTES # BLD AUTO: 29.9 % — SIGNIFICANT CHANGE UP (ref 20.5–51.1)
MAGNESIUM SERPL-MCNC: 2.1 MG/DL — SIGNIFICANT CHANGE UP (ref 1.8–2.4)
MAGNESIUM SERPL-MCNC: 2.2 MG/DL — SIGNIFICANT CHANGE UP (ref 1.8–2.4)
MCHC RBC-ENTMCNC: 32.6 PG — HIGH (ref 27–31)
MCHC RBC-ENTMCNC: 32.7 PG — HIGH (ref 27–31)
MCHC RBC-ENTMCNC: 34.5 G/DL — SIGNIFICANT CHANGE UP (ref 32–37)
MCHC RBC-ENTMCNC: 34.7 G/DL — SIGNIFICANT CHANGE UP (ref 32–37)
MCV RBC AUTO: 93.8 FL — SIGNIFICANT CHANGE UP (ref 81–99)
MCV RBC AUTO: 94.7 FL — SIGNIFICANT CHANGE UP (ref 81–99)
METHOD TYPE: SIGNIFICANT CHANGE UP
MONOCYTES # BLD AUTO: 0.28 K/UL — SIGNIFICANT CHANGE UP (ref 0.1–0.6)
MONOCYTES NFR BLD AUTO: 4.5 % — SIGNIFICANT CHANGE UP (ref 1.7–9.3)
NEUTROPHILS # BLD AUTO: 4.02 K/UL — SIGNIFICANT CHANGE UP (ref 1.4–6.5)
NEUTROPHILS NFR BLD AUTO: 64.6 % — SIGNIFICANT CHANGE UP (ref 42.2–75.2)
NRBC # BLD: 0 /100 WBCS — SIGNIFICANT CHANGE UP (ref 0–0)
NRBC # BLD: 0 /100 WBCS — SIGNIFICANT CHANGE UP (ref 0–0)
PLATELET # BLD AUTO: 316 K/UL — SIGNIFICANT CHANGE UP (ref 130–400)
PLATELET # BLD AUTO: 348 K/UL — SIGNIFICANT CHANGE UP (ref 130–400)
POTASSIUM SERPL-MCNC: 3.4 MMOL/L — LOW (ref 3.5–5)
POTASSIUM SERPL-MCNC: 3.6 MMOL/L — SIGNIFICANT CHANGE UP (ref 3.5–5)
POTASSIUM SERPL-SCNC: 3.4 MMOL/L — LOW (ref 3.5–5)
POTASSIUM SERPL-SCNC: 3.6 MMOL/L — SIGNIFICANT CHANGE UP (ref 3.5–5)
PROT SERPL-MCNC: 5.9 G/DL — LOW (ref 6–8)
PROT SERPL-MCNC: 6 G/DL — SIGNIFICANT CHANGE UP (ref 6–8)
PROTHROM AB SERPL-ACNC: 11.6 SEC — SIGNIFICANT CHANGE UP (ref 9.95–12.87)
RBC # BLD: 4.19 M/UL — LOW (ref 4.2–5.4)
RBC # BLD: 4.39 M/UL — SIGNIFICANT CHANGE UP (ref 4.2–5.4)
RBC # FLD: 13.9 % — SIGNIFICANT CHANGE UP (ref 11.5–14.5)
RBC # FLD: 14.2 % — SIGNIFICANT CHANGE UP (ref 11.5–14.5)
SODIUM SERPL-SCNC: 143 MMOL/L — SIGNIFICANT CHANGE UP (ref 135–146)
SODIUM SERPL-SCNC: 146 MMOL/L — SIGNIFICANT CHANGE UP (ref 135–146)
WBC # BLD: 6.22 K/UL — SIGNIFICANT CHANGE UP (ref 4.8–10.8)
WBC # BLD: 9.8 K/UL — SIGNIFICANT CHANGE UP (ref 4.8–10.8)
WBC # FLD AUTO: 6.22 K/UL — SIGNIFICANT CHANGE UP (ref 4.8–10.8)
WBC # FLD AUTO: 9.8 K/UL — SIGNIFICANT CHANGE UP (ref 4.8–10.8)

## 2022-04-04 RX ORDER — POTASSIUM CHLORIDE 20 MEQ
40 PACKET (EA) ORAL ONCE
Refills: 0 | Status: COMPLETED | OUTPATIENT
Start: 2022-04-04 | End: 2022-04-04

## 2022-04-04 RX ORDER — HYDROCORTISONE 20 MG
50 TABLET ORAL EVERY 12 HOURS
Refills: 0 | Status: DISCONTINUED | OUTPATIENT
Start: 2022-04-05 | End: 2022-04-07

## 2022-04-04 RX ORDER — SODIUM CHLORIDE 9 MG/ML
1000 INJECTION, SOLUTION INTRAVENOUS
Refills: 0 | Status: DISCONTINUED | OUTPATIENT
Start: 2022-04-05 | End: 2022-04-06

## 2022-04-04 RX ADMIN — Medication 100 MILLIGRAM(S): at 14:41

## 2022-04-04 RX ADMIN — MIDODRINE HYDROCHLORIDE 10 MILLIGRAM(S): 2.5 TABLET ORAL at 14:41

## 2022-04-04 RX ADMIN — MUPIROCIN 1 APPLICATION(S): 20 OINTMENT TOPICAL at 18:35

## 2022-04-04 RX ADMIN — PANTOPRAZOLE SODIUM 40 MILLIGRAM(S): 20 TABLET, DELAYED RELEASE ORAL at 12:34

## 2022-04-04 RX ADMIN — Medication 1 APPLICATION(S): at 06:24

## 2022-04-04 RX ADMIN — ENOXAPARIN SODIUM 40 MILLIGRAM(S): 100 INJECTION SUBCUTANEOUS at 12:34

## 2022-04-04 RX ADMIN — Medication 50 MILLIGRAM(S): at 06:25

## 2022-04-04 RX ADMIN — MUPIROCIN 1 APPLICATION(S): 20 OINTMENT TOPICAL at 06:24

## 2022-04-04 RX ADMIN — MIDODRINE HYDROCHLORIDE 10 MILLIGRAM(S): 2.5 TABLET ORAL at 21:43

## 2022-04-04 RX ADMIN — Medication 5 MILLIGRAM(S): at 21:42

## 2022-04-04 RX ADMIN — Medication 1 TABLET(S): at 12:34

## 2022-04-04 RX ADMIN — RALOXIFENE HYDROCHLORIDE 60 MILLIGRAM(S): 60 TABLET, COATED ORAL at 12:34

## 2022-04-04 RX ADMIN — Medication 40 MILLIEQUIVALENT(S): at 12:33

## 2022-04-04 RX ADMIN — GABAPENTIN 300 MILLIGRAM(S): 400 CAPSULE ORAL at 18:36

## 2022-04-04 RX ADMIN — Medication 1 APPLICATION(S): at 18:36

## 2022-04-04 RX ADMIN — GABAPENTIN 300 MILLIGRAM(S): 400 CAPSULE ORAL at 06:24

## 2022-04-04 RX ADMIN — Medication 50 MILLIGRAM(S): at 14:41

## 2022-04-04 RX ADMIN — MIDODRINE HYDROCHLORIDE 10 MILLIGRAM(S): 2.5 TABLET ORAL at 06:25

## 2022-04-04 RX ADMIN — Medication 100 MILLIGRAM(S): at 21:41

## 2022-04-04 RX ADMIN — Medication 100 MILLIGRAM(S): at 06:25

## 2022-04-04 NOTE — PROGRESS NOTE ADULT - SUBJECTIVE AND OBJECTIVE BOX
TEA ECHAVARRIA  56y  Female    afebrile  eating well  no acute problems  bp stable  INTERVAL EVENTS:    T(C): 36.6 (04-04-22 @ 04:57), Max: 37 (04-03-22 @ 20:18)  HR: 71 (04-04-22 @ 04:57) (71 - 89)  BP: 120/75 (04-04-22 @ 04:57) (104/65 - 120/75)  RR: 18 (04-04-22 @ 04:57) (18 - 20)  SpO2: --  Wt(kg): --Vital Signs Last 24 Hrs  T(C): 36.6 (04 Apr 2022 04:57), Max: 37 (03 Apr 2022 20:18)  T(F): 97.8 (04 Apr 2022 04:57), Max: 98.6 (03 Apr 2022 20:18)  HR: 71 (04 Apr 2022 04:57) (71 - 89)  BP: 120/75 (04 Apr 2022 04:57) (104/65 - 120/75)  BP(mean): 71 (04 Apr 2022 04:57) (71 - 78)  RR: 18 (04 Apr 2022 04:57) (18 - 20)  SpO2: --    PHYSICAL EXAM:  GENERAL: resting comfortably  NECK: Supple, No JVD, Normal thyroid  CHEST/LUNG: Clear; No crackles or wheezing  HEART: S1, S2, Regular rate and rhythm;   ABDOMEN: Soft, Nontender, Nondistended; Bowel sounds present  EXTREMITIES: No clubbing, cyanosis, or edema  SKIN: chronic lesions    LABS:                          13.7   6.22  )-----------( 316      ( 04 Apr 2022 07:28 )             39.7     04-04    146  |  107  |  8<L>  ----------------------------<  151<H>  3.4<L>   |  27  |  0.6<L>    Ca    8.3<L>      04 Apr 2022 07:28  Mg     2.1     04-04    TPro  6.0  /  Alb  3.3<L>  /  TBili  <0.2  /  DBili  x   /  AST  15  /  ALT  12  /  AlkPhos  99  04-04          Culture - Tissue with Gram Stain (collected 01 Apr 2022 17:23)  Source: .Tissue None  Gram Stain (02 Apr 2022 11:38):    No polymorphonuclear leukocytes seen per low power field    No organisms seen seen per oil power field  Preliminary Report (03 Apr 2022 14:10):    Moderate Staphylococcus lugdunensis          ceFAZolin   IVPB 2000 milliGRAM(s) IV Intermittent every 8 hours  collagenase Ointment 1 Application(s) Topical two times a day  enoxaparin Injectable 40 milliGRAM(s) SubCutaneous every 24 hours  gabapentin Solution 300 milliGRAM(s) Oral every 12 hours  hydrocortisone sodium succinate Injectable 50 milliGRAM(s) IV Push every 8 hours  melatonin 5 milliGRAM(s) Oral at bedtime  midodrine. 10 milliGRAM(s) Oral every 8 hours  multivitamin 1 Tablet(s) Oral daily  mupirocin 2% Ointment 1 Application(s) Topical two times a day  pantoprazole   Suspension 40 milliGRAM(s) Oral daily  raloxifene 60 milliGRAM(s) Oral daily      RADIOLOGY & ADDITIONAL TESTS:    case discussed with the resident  Available consult notes reviewed    Assessment and plan:     needs repeat  excisional debridement  will discuss w CAB- get consent [phone]

## 2022-04-04 NOTE — PROGRESS NOTE ADULT - SUBJECTIVE AND OBJECTIVE BOX
************************************************  Rodolfo Hutson MD (PGY-1)  Spectra: [phone]  ************************************************    SUBJECTIVE / OVERNIGHT EVENTS  Unable to interview due to patient's current mental status. No acute overnight events reported by clinical staff this AM. No reported fevers, chills, respiratory distress, vomiting, or diarrhea.     MEDICATIONS  ceFAZolin   IVPB 2000 milliGRAM(s) IV Intermittent every 8 hours  collagenase Ointment 1 Application(s) Topical two times a day  enoxaparin Injectable 40 milliGRAM(s) SubCutaneous every 24 hours  gabapentin Solution 300 milliGRAM(s) Oral every 12 hours  melatonin 5 milliGRAM(s) Oral at bedtime  midodrine. 10 milliGRAM(s) Oral every 8 hours  multivitamin 1 Tablet(s) Oral daily  mupirocin 2% Ointment 1 Application(s) Topical two times a day  pantoprazole   Suspension 40 milliGRAM(s) Oral daily  raloxifene 60 milliGRAM(s) Oral daily    VITALS /  EXAM    T(C): 36.3 (04-04-22 @ 14:59), Max: 37 (04-03-22 @ 20:18)  HR: 71 (04-04-22 @ 14:59) (71 - 80)  BP: 110/56 (04-04-22 @ 14:59) (109/60 - 120/75)  RR: 18 (04-04-22 @ 14:59) (18 - 18)  SpO2: 93% (04-04-22 @ 14:59) (93% - 93%)    GENERAL: NAD, non-verbal  CHEST/LUNG: Clear to auscultation bilaterally; No wheezes, rales or rhonchi  HEART: Regular rate and rhythm; No murmurs, rubs, or gallops  ABDOMEN: Soft, Nontender, Nondistended; Bowel sounds present, no masses.  EXTREMITIES:  2+ Peripheral Pulses, No clubbing, cyanosis, or edema    I's & O's     04-03-22 @ 07:01  -  04-04-22 @ 07:00  --------------------------------------------------------  IN:    IV PiggyBack: 50 mL  Total IN: 50 mL    OUT:  Total OUT: 0 mL    Total NET: 50 mL    LABS             13.7   6.22  )-----------( 316      ( 04-04-22 @ 07:28 )             39.7     146  |  107  |  8   -------------------------<  151   04-04-22 @ 07:28  3.4  |  27  |  0.6    Ca      8.3     04-04-22 @ 07:28  Mg     2.1     04-04-22 @ 07:28    TPro  6.0  /  Alb  3.3  /  TBili  <0.2  /  DBili  x   /  AST  15  /  ALT  12  /  AlkPhos  99  /  GGT  x     04-04-22 @ 07:28    MICRO / IMAGING / CARDIOLOGY    Culture - Tissue with Gram Stain (collected 04-01-22 @ 17:23)  Source: .Tissue None  Gram Stain:    No polymorphonuclear leukocytes seen per low power field    No organisms seen seen per oil power field  Preliminary Report:    Moderate Staphylococcus lugdunensis    Rare Staphylococcus aureus Susceptibility to follow.    Few Enterococcus faecalis Susceptibility to follow.  Organism: Staphylococcus lugdunensis  Organism: Staphylococcus lugdunensis      -  Ampicillin/Sulbactam: S <=8/4      -  Cefazolin: S <=4      -  Clindamycin: S <=0.25      -  Erythromycin: S <=0.25      -  Gentamicin: S <=1 Should not be used as monotherapy      -  Oxacillin: S 0.5      -  Rifampin: S <=1 Should not be used as monotherapy      -  Tetra/Doxy: S <=1      -  Trimethoprim/Sulfamethoxazole: S <=0.5/9.5      -  Vancomycin: S 1      Method Type: ZANE

## 2022-04-04 NOTE — PROGRESS NOTE ADULT - ASSESSMENT
57 y/o female with a pmhx of down syndrome, non-verbal, hypothyroidism, osteoporosis presents after multiple episodes of vomiting admitted for septic shock.    #Septic shock s/p pressors POA (resolved)  #Acute hypoxic respiratory failure 2/2 Aspiration pneumonia/pneumonitis  #Acute cystitis  - MRSA positive  - CXR: Persistent left-sided opacities.  - F/u BCx and Urinary Cx. urine negative, no growth to date in blood  - F/u sputum culture. Strep/Legionella Ag both negative  - cont midodrine 10mg q8  - s/p 7 days Vancomycin and Cefepime     #Septic arthritis of Right Foot 1st MTP joint   - Right foot xray: Findings consistent with septic arthritis/osteomyelitis at the first MTP joint  - Arterial duplex: Normal arterial flow in the visualized arteries of bilateral lower extremities. Bilateral peroneal arteries were not visualized.  - ESR 40, CRP 16   - 3/28 R foot : ZACHARY, S lugdunensis  - podiatry following: s/p debridement 4/1, plan for possible amputation of 1st hallux Tuesday 4/5   - pt cleared by cardio for surgery   - cont with ancef 2g IV q 8hrs as per ID   - On discharge change to po Augmentin 875 mg q12h for 4 more weeks as per ID recs     #Sinus Bradycardia   - TTE EF 65%. mild TR, mild AS   - TSH 4.1, T4 8.4   - Avoid sinus/AV dayanara blocking agents .    #Osteoporosis-Continue Raloxifene  #Neuropathy- Continue Gabapentin  #Downs Syndrome- Non verbal, Wheelchair bound  #Functional quadriplegia - wheelchair/ bed bound     DVT PPX: Lovenox 40mg SQ QD  GI PPX: pantoprazole 40mg PO QD  DIET: Pureed w/ Mildly Thick Liquids; NPO after MN  ACTIVITY: WBAT  CODE STATUS: Full Code  DISPOSITION: From Home    PENDING:     dvt ppx: lovenox   gi ppx: PPI   diet: pureed with mildly thick liquids  activity: bedrest   full code     Dispo: possible surgery tues 4/5 with podiatry  57 y/o female with a pmhx of down syndrome, non-verbal, hypothyroidism, osteoporosis presents after multiple episodes of vomiting admitted for septic shock.    #Right Forefoot Plantar Ulcer, 1st MTP Joint  #Septic Arthritis vs Osteomyelitis  On 3/28, pt noted to have right forefoot ulcer. XR R Foot demonstrated findings consistent with septic arthritis/osteomyelitis at the first MTP joint. Wound Cx 3/28 demonstrated ZACHARY (S lugdunensis). Arterial duplex demonstrated normal arterial flow in b/l LE. ESR 40 and CRP 16. S/P excisional debridement on 4/1 w/ Podiatry Team.  - podiatry following: s/p debridement 4/1, plan for possible amputation of 1st hallux Tuesday 4/5   - c/w cefazolin 2g IV Q8H    - Transition to Augmentin 875mg PO BID for 4 weeks on discharge  - OR tomorrow (4/5) for repeat debridement and possible 1st hallux amputation    #Septic shock s/p pressors POA (resolved)  #Acute hypoxic respiratory failure 2/2 Aspiration pneumonia/pneumonitis  #Acute cystitis  - MRSA positive  - CXR: Persistent left-sided opacities.  - F/u BCx and Urinary Cx. urine negative, no growth to date in blood  - F/u sputum culture. Strep/Legionella Ag both negative  - cont midodrine 10mg q8  - s/p 7 days Vancomycin and Cefepime     #Osteoporosis  - c/w raloxifene 60mg PO QD    #Neuropathy  - c/w gabapentin 400mg PO BID    #Down Syndrome  #Functional quadriplegia  Non-verbal at baseline. Wheelchair/bed bound.    DVT PPX: Lovenox 40mg SQ QD  GI PPX: pantoprazole 40mg PO QD  DIET: Pureed w/ Mildly Thick Liquids; NPO after MN  ACTIVITY: WBAT  CODE STATUS: Full Code  DISPOSITION: From Group Home    PENDING: OR w/ Podiatry tomorrow

## 2022-04-04 NOTE — PROGRESS NOTE ADULT - SUBJECTIVE AND OBJECTIVE BOX
Podiatry Progress Note    Subjective:   TEA ECHAVARRIA is a pleasant well-nourished, well developed 56y Female in no acute distress, alert awake, and not oriented to person, place and time.  Patient is a 56y old  Female who presents with a chief complaint of Aspiration pneumonia (04 Apr 2022 08:53). Pt seen & evaluated at bedside. Sitter bedside. No new pedal complaints per sitter.       PAST MEDICAL & SURGICAL HISTORY:  Down syndrome    Osteoporosis    Mild anemia    Neuropathy    S/P debridement  of R hip on 3/2/21         Objective:  Vital Signs Last 24 Hrs  T(C): 36.6 (04 Apr 2022 04:57), Max: 37 (03 Apr 2022 20:18)  T(F): 97.8 (04 Apr 2022 04:57), Max: 98.6 (03 Apr 2022 20:18)  HR: 71 (04 Apr 2022 04:57) (71 - 89)  BP: 120/75 (04 Apr 2022 04:57) (104/65 - 120/75)  BP(mean): 71 (04 Apr 2022 04:57) (71 - 78)  RR: 18 (04 Apr 2022 04:57) (18 - 20)  SpO2: --                        13.7   6.22  )-----------( 316      ( 04 Apr 2022 07:28 )             39.7                 04-04    146  |  107  |  8<L>  ----------------------------<  151<H>  3.4<L>   |  27  |  0.6<L>    Ca    8.3<L>      04 Apr 2022 07:28  Mg     2.1     04-04    TPro  6.0  /  Alb  3.3<L>  /  TBili  <0.2  /  DBili  x   /  AST  15  /  ALT  12  /  AlkPhos  99  04-04            Physical Exam - Lower Extremity Focused:   #Right Lower Extremity  Derm:   Right 1st MPJ wound; (+) PTB; Dried sanguinous exudate to periwound; No active drainage/bleeding; No malodor;     Vascular: DP and PT Pulses Palpable; Foot is Warm to Warm to the touch   Neuro: Not assessable 2/2 Mental Status  MSK: Contracted; Further examination not assessable 2/2 Mental Status     Assessment:  Right Forefoot Plantar Ulcer (1st Submetatarsal head) - Probes to Bone  s/p Right foot 1st metatarsal head resection (4/1/22)    Plan:  Chart reviewed and Patient evaluated.   Wound Flushed w/ NS; Wound Dressed w/ Xeroform / DSD / Kerlix; q24  Local Wound Care; As Stated Above   A-Duplex B/L (3/29/22);   -Normal arterial flow in the visualized arteries of bilateral lower extremities.  -Bilateral peroneal arteries were not visualized.  -Difficult study due to patient positioning.  ID abx recs appreciated;   Sx scheduled Tuesday 4/5; Excisional debridement of soft tissue and bone  w/ possible 1st metatarsal head resection, R foot;  Plan for Excisional debridement of soft tissue and bone w/hallux amputation, R foot;  Attending to speak with Health Care Proxy; Amy Rich; [phone], for consent;  Cardio Clearance appreciated; Moderate risk for MACE;  NPO @ [place] Monday 4/4;  Please order T&S and COAG studies 4/4;  Please optimize pt lab values prior to OR;   Weight bearing status; WBAT BL feet;   Discussed plan w/ Dr. Everett;     Podiatry        Podiatry Progress Note    Subjective:   TEA ECHAVARRIA is a pleasant well-nourished, well developed 56y Female in no acute distress, alert awake, and not oriented to person, place and time.  Patient is a 56y old  Female who presents with a chief complaint of Aspiration pneumonia (04 Apr 2022 08:53). Pt seen & evaluated at bedside. Sitter bedside. No new pedal complaints per sitter.       PAST MEDICAL & SURGICAL HISTORY:  Down syndrome    Osteoporosis    Mild anemia    Neuropathy    S/P debridement  of R hip on 3/2/21         Objective:  Vital Signs Last 24 Hrs  T(C): 36.6 (04 Apr 2022 04:57), Max: 37 (03 Apr 2022 20:18)  T(F): 97.8 (04 Apr 2022 04:57), Max: 98.6 (03 Apr 2022 20:18)  HR: 71 (04 Apr 2022 04:57) (71 - 89)  BP: 120/75 (04 Apr 2022 04:57) (104/65 - 120/75)  BP(mean): 71 (04 Apr 2022 04:57) (71 - 78)  RR: 18 (04 Apr 2022 04:57) (18 - 20)  SpO2: --                        13.7   6.22  )-----------( 316      ( 04 Apr 2022 07:28 )             39.7                 04-04    146  |  107  |  8<L>  ----------------------------<  151<H>  3.4<L>   |  27  |  0.6<L>    Ca    8.3<L>      04 Apr 2022 07:28  Mg     2.1     04-04    TPro  6.0  /  Alb  3.3<L>  /  TBili  <0.2  /  DBili  x   /  AST  15  /  ALT  12  /  AlkPhos  99  04-04            Physical Exam - Lower Extremity Focused:   #Right Lower Extremity  Derm:   Right 1st MPJ wound; (+) PTB; Dried sanguinous exudate to periwound; No active drainage/bleeding; No malodor;     Vascular: DP and PT Pulses Palpable; Foot is Warm to Warm to the touch   Neuro: Not assessable 2/2 Mental Status  MSK: Contracted; Further examination not assessable 2/2 Mental Status     Assessment:  Right Forefoot Plantar Ulcer (1st Submetatarsal head) - Probes to Bone  s/p Right foot 1st metatarsal head resection (4/1/22)    Plan:  Chart reviewed and Patient evaluated.   Wound Flushed w/ NS; Wound Dressed w/ Xeroform / DSD / Kerlix; q24  Local Wound Care; As Stated Above   A-Duplex B/L (3/29/22);   -Normal arterial flow in the visualized arteries of bilateral lower extremities.  -Bilateral peroneal arteries were not visualized.  -Difficult study due to patient positioning.  ID abx recs appreciated;   Need for Right Hallux amputation; Nonfunctional digit; osteomyelitis/septic arthritis at 1st MPTJ, with chronic overlying ulcer at 1st MPTJ.   Sx scheduled Tuesday 4/5; Excisional debridement of soft tissue and bone  w/ possible 1st metatarsal head resection, R foot;  Plan for Excisional debridement of soft tissue and bone w/HALLUX AMPUTATION, R foot;  Cardio Clearance appreciated; Moderate risk for MACE;  NPO @ [place] Monday 4/4;  Please order T&S and COAG studies 4/4;  Please optimize pt lab values prior to OR;   Weight bearing status; WBAT BL feet;   Discussed plan w/ Dr. Everett;     Podiatry        Podiatry Progress Note    Subjective:   TEA ECHAVARRIA is a pleasant well-nourished, well developed 56y Female in no acute distress, alert awake, and not oriented to person, place and time.  Patient is a 56y old  Female who presents with a chief complaint of Aspiration pneumonia (04 Apr 2022 08:53). Pt seen & evaluated at bedside. Sitter bedside. No new pedal complaints per sitter.       PAST MEDICAL & SURGICAL HISTORY:  Down syndrome    Osteoporosis    Mild anemia    Neuropathy    S/P debridement  of R hip on 3/2/21         Objective:  Vital Signs Last 24 Hrs  T(C): 36.6 (04 Apr 2022 04:57), Max: 37 (03 Apr 2022 20:18)  T(F): 97.8 (04 Apr 2022 04:57), Max: 98.6 (03 Apr 2022 20:18)  HR: 71 (04 Apr 2022 04:57) (71 - 89)  BP: 120/75 (04 Apr 2022 04:57) (104/65 - 120/75)  BP(mean): 71 (04 Apr 2022 04:57) (71 - 78)  RR: 18 (04 Apr 2022 04:57) (18 - 20)  SpO2: --                        13.7   6.22  )-----------( 316      ( 04 Apr 2022 07:28 )             39.7                 04-04    146  |  107  |  8<L>  ----------------------------<  151<H>  3.4<L>   |  27  |  0.6<L>    Ca    8.3<L>      04 Apr 2022 07:28  Mg     2.1     04-04    TPro  6.0  /  Alb  3.3<L>  /  TBili  <0.2  /  DBili  x   /  AST  15  /  ALT  12  /  AlkPhos  99  04-04            Physical Exam - Lower Extremity Focused:   #Right Lower Extremity  Derm:   Right 1st MPJ wound; (+) PTB; Dried sanguinous exudate to periwound; No active drainage/bleeding; No malodor;     Vascular: DP and PT Pulses Palpable; Foot is Warm to Warm to the touch   Neuro: Not assessable 2/2 Mental Status  MSK: Contracted; Further examination not assessable 2/2 Mental Status     Assessment:  Right Forefoot Plantar Ulcer (1st Submetatarsal head) - Probes to Bone  s/p Right foot 1st metatarsal head resection (4/1/22)    Plan:  Chart reviewed and Patient evaluated.   Wound Flushed w/ NS; Wound Dressed w/ Xeroform / DSD / Kerlix; q24  Local Wound Care; As Stated Above   A-Duplex B/L (3/29/22);   -Normal arterial flow in the visualized arteries of bilateral lower extremities.  -Bilateral peroneal arteries were not visualized.  -Difficult study due to patient positioning.  ID abx recs appreciated;   Need for Right Hallux amputation; Nonfunctional digit; osteomyelitis/septic arthritis at 1st MPTJ, with chronic nonhealing ulcer overlying 1st MPTJ.   Sx scheduled Tuesday 4/5; Excisional debridement of soft tissue and bone  w/ possible 1st metatarsal head resection, R foot;  Need to reschedule sx as Excisional debridement of soft tissue and bone w/HALLUX AMPUTATION, R foot;  Will attempt to call Health Care Proxy for Consent; [phone]  Cardio Clearance appreciated; Moderate risk for MACE;  NPO @ [place] Monday 4/4;  Please order T&S and COAG studies 4/4;  Please optimize pt lab values prior to OR;   Weight bearing status; WBAT BL feet;   Discussed plan w/ Dr. Everett;     Podiatry

## 2022-04-05 LAB
-  AMPICILLIN/SULBACTAM: SIGNIFICANT CHANGE UP
-  AMPICILLIN: SIGNIFICANT CHANGE UP
-  CEFAZOLIN: SIGNIFICANT CHANGE UP
-  CLINDAMYCIN: SIGNIFICANT CHANGE UP
-  ERYTHROMYCIN: SIGNIFICANT CHANGE UP
-  GENTAMICIN: SIGNIFICANT CHANGE UP
-  OXACILLIN: SIGNIFICANT CHANGE UP
-  PENICILLIN: SIGNIFICANT CHANGE UP
-  RIFAMPIN: SIGNIFICANT CHANGE UP
-  TETRACYCLINE: SIGNIFICANT CHANGE UP
-  TETRACYCLINE: SIGNIFICANT CHANGE UP
-  TRIMETHOPRIM/SULFAMETHOXAZOLE: SIGNIFICANT CHANGE UP
-  VANCOMYCIN: SIGNIFICANT CHANGE UP
-  VANCOMYCIN: SIGNIFICANT CHANGE UP
BLD GP AB SCN SERPL QL: SIGNIFICANT CHANGE UP
HCG UR QL: NEGATIVE — SIGNIFICANT CHANGE UP
METHOD TYPE: SIGNIFICANT CHANGE UP
METHOD TYPE: SIGNIFICANT CHANGE UP

## 2022-04-05 PROCEDURE — 99231 SBSQ HOSP IP/OBS SF/LOW 25: CPT | Mod: GC

## 2022-04-05 RX ADMIN — SODIUM CHLORIDE 75 MILLILITER(S): 9 INJECTION, SOLUTION INTRAVENOUS at 20:58

## 2022-04-05 RX ADMIN — Medication 5 MILLIGRAM(S): at 22:42

## 2022-04-05 RX ADMIN — MUPIROCIN 1 APPLICATION(S): 20 OINTMENT TOPICAL at 05:39

## 2022-04-05 RX ADMIN — Medication 100 MILLIGRAM(S): at 05:38

## 2022-04-05 RX ADMIN — SODIUM CHLORIDE 75 MILLILITER(S): 9 INJECTION, SOLUTION INTRAVENOUS at 05:40

## 2022-04-05 RX ADMIN — GABAPENTIN 300 MILLIGRAM(S): 400 CAPSULE ORAL at 17:59

## 2022-04-05 RX ADMIN — SODIUM CHLORIDE 75 MILLILITER(S): 9 INJECTION, SOLUTION INTRAVENOUS at 00:15

## 2022-04-05 RX ADMIN — Medication 100 MILLIGRAM(S): at 22:41

## 2022-04-05 RX ADMIN — GABAPENTIN 300 MILLIGRAM(S): 400 CAPSULE ORAL at 05:39

## 2022-04-05 RX ADMIN — Medication 1 TABLET(S): at 11:37

## 2022-04-05 RX ADMIN — MIDODRINE HYDROCHLORIDE 10 MILLIGRAM(S): 2.5 TABLET ORAL at 05:39

## 2022-04-05 RX ADMIN — Medication 50 MILLIGRAM(S): at 17:59

## 2022-04-05 RX ADMIN — MUPIROCIN 1 APPLICATION(S): 20 OINTMENT TOPICAL at 17:59

## 2022-04-05 RX ADMIN — Medication 1 APPLICATION(S): at 05:40

## 2022-04-05 RX ADMIN — PANTOPRAZOLE SODIUM 40 MILLIGRAM(S): 20 TABLET, DELAYED RELEASE ORAL at 11:38

## 2022-04-05 RX ADMIN — MIDODRINE HYDROCHLORIDE 10 MILLIGRAM(S): 2.5 TABLET ORAL at 16:27

## 2022-04-05 RX ADMIN — MIDODRINE HYDROCHLORIDE 10 MILLIGRAM(S): 2.5 TABLET ORAL at 22:42

## 2022-04-05 RX ADMIN — Medication 50 MILLIGRAM(S): at 05:38

## 2022-04-05 RX ADMIN — RALOXIFENE HYDROCHLORIDE 60 MILLIGRAM(S): 60 TABLET, COATED ORAL at 11:38

## 2022-04-05 RX ADMIN — Medication 100 MILLIGRAM(S): at 16:27

## 2022-04-05 RX ADMIN — SODIUM CHLORIDE 75 MILLILITER(S): 9 INJECTION, SOLUTION INTRAVENOUS at 22:40

## 2022-04-05 NOTE — PROGRESS NOTE ADULT - SUBJECTIVE AND OBJECTIVE BOX
Podiatry Progress Note    Subjective:   TEA ECHAVARRIA is a 56y old  Female who presents with a chief complaint of Aspiration pneumonia (05 Apr 2022 07:15). **Sx plan updated.      PAST MEDICAL & SURGICAL HISTORY:  Down syndrome    Osteoporosis    Mild anemia    Neuropathy    S/P debridement  of R hip on 3/2/21         Objective:  Vital Signs Last 24 Hrs  T(C): 36.3 (05 Apr 2022 06:08), Max: 36.3 (04 Apr 2022 14:59)  T(F): 97.4 (05 Apr 2022 06:08), Max: 97.4 (05 Apr 2022 06:08)  HR: 66 (05 Apr 2022 06:08) (66 - 71)  BP: 131/82 (05 Apr 2022 06:08) (110/56 - 131/82)  BP(mean): 77 (04 Apr 2022 14:59) (77 - 77)  RR: 18 (05 Apr 2022 06:08) (18 - 18)  SpO2: 93% (04 Apr 2022 14:59) (93% - 93%)                        14.3   9.80  )-----------( 348      ( 04 Apr 2022 20:00 )             41.2                 04-04    143  |  102  |  10  ----------------------------<  216<H>  3.6   |  26  |  0.8    Ca    8.4<L>      04 Apr 2022 20:00  Mg     2.2     04-04    TPro  5.9<L>  /  Alb  3.1<L>  /  TBili  <0.2  /  DBili  x   /  AST  18  /  ALT  16  /  AlkPhos  111  04-04      Assessment:  Right Forefoot Plantar Ulcer (1st Submetatarsal head) - Probes to Bone  s/p Right foot 1st metatarsal head resection (4/1/22)    Plan:  Chart reviewed and Patient evaluated.   Wound Flushed w/ NS; Wound Dressed w/ Xeroform / DSD / Kerlix; q24  Local Wound Care; As Stated Above   A-Duplex B/L (3/29/22);   -Normal arterial flow in the visualized arteries of bilateral lower extremities.  -Bilateral peroneal arteries were not visualized.  -Difficult study due to patient positioning.  ID abx recs appreciated;   Need for Right Hallux amputation; Nonfunctional digit; osteomyelitis/septic arthritis at 1st MPTJ, with chronic nonhealing ulcer overlying 1st MPTJ.   Sx scheduled Tuesday 4/5; Excisional debridement of soft tissue and bone  w/ possible 1st metatarsal head resection, R foot;  Need to reschedule sx as Excisional debridement of soft tissue and bone w/HALLUX AMPUTATION, R foot;  ****Spoke with La Nena, Health Care Proxy regarding need to amend sx Procedure scheduled for today, Tues 4/5.   -Will Fax EMR Record to [phone]; HCP will submit to Board for approval     Cardio Clearance appreciated; Moderate risk for MACE;  NPO @ [place] Monday 4/4;  Please order T&S and COAG studies 4/4;  Please optimize pt lab values prior to OR;   Weight bearing status; WBAT BL feet;   Discussed plan w/ Dr. Everett;     Podiatry

## 2022-04-05 NOTE — PROGRESS NOTE ADULT - SUBJECTIVE AND OBJECTIVE BOX
TEA ECHAVARRIA  56y  Female  npo for surgery   cab available for consent  bp stable  on weaning schedule w steroids- monitor bp, may need to be on midodrin if low    INTERVAL EVENTS:    T(C): 36.3 (04-05-22 @ 06:08), Max: 36.3 (04-04-22 @ 14:59)  HR: 66 (04-05-22 @ 06:08) (66 - 71)  BP: 131/82 (04-05-22 @ 06:08) (110/56 - 131/82)  RR: 18 (04-05-22 @ 06:08) (18 - 18)  SpO2: 93% (04-04-22 @ 14:59) (93% - 93%)  Wt(kg): --Vital Signs Last 24 Hrs  T(C): 36.3 (05 Apr 2022 06:08), Max: 36.3 (04 Apr 2022 14:59)  T(F): 97.4 (05 Apr 2022 06:08), Max: 97.4 (05 Apr 2022 06:08)  HR: 66 (05 Apr 2022 06:08) (66 - 71)  BP: 131/82 (05 Apr 2022 06:08) (110/56 - 131/82)  BP(mean): 77 (04 Apr 2022 14:59) (77 - 77)  RR: 18 (05 Apr 2022 06:08) (18 - 18)  SpO2: 93% (04 Apr 2022 14:59) (93% - 93%)    PHYSICAL EXAM:  GENERAL: resting comfortably  NECK: Supple, No JVD, Normal thyroid  CHEST/LUNG: Clear; No crackles or wheezing  HEART: S1, S2, Regular rate and rhythm;   ABDOMEN: Soft, Nontender, Nondistended; Bowel sounds present  EXTREMITIES: No clubbing, cyanosis, or edema  SKIN: chronic lesions    LABS:                          14.3   9.80  )-----------( 348      ( 04 Apr 2022 20:00 )             41.2     04-04    143  |  102  |  10  ----------------------------<  216<H>  3.6   |  26  |  0.8    Ca    8.4<L>      04 Apr 2022 20:00  Mg     2.2     04-04    TPro  5.9<L>  /  Alb  3.1<L>  /  TBili  <0.2  /  DBili  x   /  AST  18  /  ALT  16  /  AlkPhos  111  04-04    PT/INR - ( 04 Apr 2022 20:00 )   PT: 11.60 sec;   INR: 1.01 ratio         PTT - ( 04 Apr 2022 20:00 )  PTT:28.8 sec          ceFAZolin   IVPB 2000 milliGRAM(s) IV Intermittent every 8 hours  collagenase Ointment 1 Application(s) Topical two times a day  enoxaparin Injectable 40 milliGRAM(s) SubCutaneous every 24 hours  gabapentin Solution 300 milliGRAM(s) Oral every 12 hours  hydrocortisone sodium succinate Injectable 50 milliGRAM(s) IV Push every 12 hours  lactated ringers. 1000 milliLiter(s) IV Continuous <Continuous>  melatonin 5 milliGRAM(s) Oral at bedtime  midodrine. 10 milliGRAM(s) Oral every 8 hours  multivitamin 1 Tablet(s) Oral daily  mupirocin 2% Ointment 1 Application(s) Topical two times a day  pantoprazole   Suspension 40 milliGRAM(s) Oral daily  raloxifene 60 milliGRAM(s) Oral daily      RADIOLOGY & ADDITIONAL TESTS:    case discussed with the resident  Available consult notes reviewed    Assessment and plan:     medically optimized for surgical debridement  need plan of further care- ?? when could be discharged on oral augmentin  4 wks as recommended by ID

## 2022-04-05 NOTE — PROGRESS NOTE ADULT - SUBJECTIVE AND OBJECTIVE BOX
************************************************  Rodolfo Hutson MD (PGY-1)  Spectra: [phone]  ************************************************    SUBJECTIVE / OVERNIGHT EVENTS  Unable to interview due to patient's current mental status. No acute overnight events reported by clinical staff this AM. No reported fevers, chills, respiratory distress, vomiting, or diarrhea.     MEDICATIONS  ceFAZolin   IVPB 2000 milliGRAM(s) IV Intermittent every 8 hours  collagenase Ointment 1 Application(s) Topical two times a day  enoxaparin Injectable 40 milliGRAM(s) SubCutaneous every 24 hours  gabapentin Solution 300 milliGRAM(s) Oral every 12 hours  hydrocortisone sodium succinate Injectable 50 milliGRAM(s) IV Push every 12 hours  lactated ringers. 1000 milliLiter(s) IV Continuous <Continuous>  melatonin 5 milliGRAM(s) Oral at bedtime  midodrine. 10 milliGRAM(s) Oral every 8 hours  multivitamin 1 Tablet(s) Oral daily  mupirocin 2% Ointment 1 Application(s) Topical two times a day  pantoprazole   Suspension 40 milliGRAM(s) Oral daily  raloxifene 60 milliGRAM(s) Oral daily      VITALS /  EXAM    T(C): 36.4 (04-05-22 @ 14:33), Max: 36.4 (04-05-22 @ 14:33)  HR: 58 (04-05-22 @ 14:33) (58 - 66)  BP: 107/62 (04-05-22 @ 14:33) (107/62 - 131/82)  RR: 18 (04-05-22 @ 14:33) (18 - 18)  SpO2: --    GENERAL: NAD, non-verbal  CHEST/LUNG: Clear to auscultation bilaterally; No wheezes, rales or rhonchi  HEART: Regular rate and rhythm; No murmurs, rubs, or gallops  ABDOMEN: Soft, Nontender, Nondistended; Bowel sounds present, no masses.  EXTREMITIES:  2+ Peripheral Pulses, No clubbing, cyanosis, or edema    LABS             14.3   9.80  )-----------( 348      ( 04-04-22 @ 20:00 )             41.2     143  |  102  |  10  -------------------------<  216   04-04-22 @ 20:00  3.6  |  26  |  0.8    Ca      8.4     04-04-22 @ 20:00  Mg     2.2     04-04-22 @ 20:00    TPro  5.9  /  Alb  3.1  /  TBili  <0.2  /  DBili  x   /  AST  18  /  ALT  16  /  AlkPhos  111  /  GGT  x     04-04-22 @ 20:00    PT/INR - ( 04-04-22 @ 20:00 )   PT: 11.60 sec;   INR: 1.01 ratio  PTT - ( 04-04-22 @ 20:00 )  PTT:28.8 sec    MICRO / IMAGING / CARDIOLOGY    Culture - Tissue with Gram Stain (collected 04-01-22 @ 17:23)  Source: .Tissue None  Gram Stain:    No polymorphonuclear leukocytes seen per low power field    No organisms seen seen per oil power field  Preliminary Report:    Moderate Staphylococcus lugdunensis    Rare Staphylococcus aureus    Few Enterococcus faecalis  Organism: Staphylococcus lugdunensis  Staphylococcus aureus  Enterococcus faecalis  Organism: Enterococcus faecalis      -  Ampicillin: S <=2 Predicts results to ampicillin/sulbactam, amoxacillin-clavulanate and  piperacillin-tazobactam.      -  Tetra/Doxy: S <=1      -  Vancomycin: S 4      Method Type: ZANE  Organism: Staphylococcus aureus      -  Ampicillin/Sulbactam: S <=8/4      -  Cefazolin: S <=4      -  Clindamycin: R <=0.25 This isolate is presumed to be clindamycin resistant based on detection of inducible resistance. Clindamycin may still be effective in some patients.      -  Erythromycin: R >4      -  Gentamicin: R >8 Should not be used as monotherapy      -  Oxacillin: S <=0.25      -  Penicillin: R 4      -  Rifampin: S <=1 Should not be used as monotherapy      -  Tetra/Doxy: S <=1      -  Trimethoprim/Sulfamethoxazole: S <=0.5/9.5      -  Vancomycin: S 2      Method Type: ZANE  Organism: Staphylococcus lugdunensis      -  Ampicillin/Sulbactam: S <=8/4      -  Cefazolin: S <=4      -  Clindamycin: S <=0.25      -  Erythromycin: S <=0.25      -  Gentamicin: S <=1 Should not be used as monotherapy      -  Oxacillin: S 0.5      -  Rifampin: S <=1 Should not be used as monotherapy      -  Tetra/Doxy: S <=1      -  Trimethoprim/Sulfamethoxazole: S <=0.5/9.5      -  Vancomycin: S 1      Method Type: ZANE

## 2022-04-05 NOTE — CHART NOTE - NSCHARTNOTEFT_GEN_A_CORE
Registered Dietitian Follow-Up     Patient Profile Reviewed                           Yes [X]   No []     Nutrition History Previously Obtained        Yes [X]  No []       Pertinent Subjective Information: Per nurse, pt has a good appetite; consuming % of meals provided. Denies nausea, vomiting, constipation, or diarrhea.     Pertinent Medical Interventions:  #Right Forefoot Plantar Ulcer, 1st MTP Joint  #Septic Arthritis vs Osteomyelitis  #Septic shock s/p pressors POA (resolved)  #Acute hypoxic respiratory failure 2/2 Aspiration pneumonia/pneumonitis  #Acute cystitis  #Osteoporosis  #Neuropathy  #Down Syndrome  #Functional quadriplegia    Diet order:   Diet, NPO after Midnight:      NPO Start Date: 2022,   NPO Start Time: 23:59  Except Medications (22 @ 11:39) [Active]  Diet, Pureed:   Mildly Thick Liquids (MILDTHICKLIQS) (22 @ 18:10) [Active]    Anthropometrics:  · Height for BMI (FEET)	5 Feet  · Height for BMI (INCHES)	0 Inch(s)  · Height for BMI (CENTIMETERS)	152.4 Centimeter(s)  · Weight for BMI (lbs)	104.1 lb  Weight (kg): 47.2 (22 @ 21:00)  IBW: 47.6 kg  Daily Weight in k.2 () , 46.4 (-), Weight in k.6 (-), Weight in k.2 (-26), Weight in k.2 (-25)  Wt fluctuations likely d/t fluid shifts; pt has generalized edema 1+ per flowsheet.    MEDICATIONS  (STANDING):  MEDICATIONS  (STANDING):  ceFAZolin   IVPB 2000 milliGRAM(s) IV Intermittent every 8 hours  collagenase Ointment 1 Application(s) Topical two times a day  enoxaparin Injectable 40 milliGRAM(s) SubCutaneous every 24 hours  gabapentin Solution 300 milliGRAM(s) Oral every 12 hours  hydrocortisone sodium succinate Injectable 50 milliGRAM(s) IV Push every 12 hours  lactated ringers. 1000 milliLiter(s) (75 mL/Hr) IV Continuous <Continuous>  melatonin 5 milliGRAM(s) Oral at bedtime  midodrine. 10 milliGRAM(s) Oral every 8 hours  multivitamin 1 Tablet(s) Oral daily  mupirocin 2% Ointment 1 Application(s) Topical two times a day  pantoprazole   Suspension 40 milliGRAM(s) Oral daily  raloxifene 60 milliGRAM(s) Oral daily    Pertinent Labs:    @ 20:00:  Glu 216  Ca 8.4     @ 07:28:  RBC 4.19  K+ 3.4  BUN 8  Cr 0.6  Cr 151  Ca 8.3      Physical Findings:  - Appearance: generalized edema 1+ per flowsheet  - GI function: abdomen no distension noted; last BM  - Tubes: no feeding tubes  - Cognitive: alert, unable to speak  - Oral/Mouth cavity: Per SLP note 3/29: continue w/ pureed diet + thin liquids; pt is on mildly thick liquids as of ??  - Skin:  WOCN 3/25: Full thickness ulceration to R posterior big toe; R hip healed full thickness wound tunneled with scar tissue and epithelium; L heel blanchable redness      Nutrition Requirements:  Weight Used: Using ABW 46 KG per previous RD       Estimated Energy Needs    Continue [x]  Adjust []   Adjusted Energy Recommendations:  0156-4772 kcal/day MSJ*1.2-1.3        Estimated Protein Needs    Continue [x]  Adjust []  Adjusted Protein Recommendations:  55-64 gm/day 1.2-1.4g/kg wound healing        Estimated Fluid Needs        Continue [x]  Adjust []  Adjusted Fluid Recommendations:  1920 mL/day 30ml/kg       Nutrient Intake: Consuming % of meals; meeting >75% of estimated energy needs.       [] Previous Nutrition Diagnosis: Increased Nutrient Needs (protein-kcal)            [X] Ongoing          [] Resolved     Goal/Expected Outcome: Continue to meet >75% of estimated needs.    Indicator/Monitoring: Diet order, PO intake, weights, labs, NFPF, body composition, tolerance to medical food supplements.    Recommendation:  1. Cont with current diet order  2. Please take weekly weights  3. Patient assessed at moderate nutrition risk; will f/u in 4-6 days.

## 2022-04-05 NOTE — PROGRESS NOTE ADULT - ASSESSMENT
55 y/o female with a pmhx of down syndrome, non-verbal, hypothyroidism, osteoporosis presents after multiple episodes of vomiting admitted for septic shock.    #Right Forefoot Plantar Ulcer, 1st MTP Joint  #Septic Arthritis vs Osteomyelitis  On 3/28, pt noted to have right forefoot ulcer. XR R Foot demonstrated findings consistent with septic arthritis/osteomyelitis at the first MTP joint. Wound Cx 3/28 demonstrated ZACHARY (S lugdunensis). Arterial duplex demonstrated normal arterial flow in b/l LE. ESR 40 and CRP 16. S/P excisional debridement on 4/1 w/ Podiatry Team.  - c/w cefazolin 2g IV Q8H  (3/31-present)  - Transition to Augmentin 875mg PO BID for 4 weeks on discharge  - OR today (4/5) for repeat debridement and possible 1st hallux amputation    #Septic shock s/p pressors POA (resolved)  #Acute hypoxic respiratory failure 2/2 Aspiration pneumonia/pneumonitis  #Acute cystitis  - MRSA positive  - CXR: Persistent left-sided opacities.  - F/u BCx and Urinary Cx. urine negative, no growth to date in blood  - F/u sputum culture. Strep/Legionella Ag both negative  - cont midodrine 10mg q8  - s/p 7 days Vancomycin and Cefepime   - c/w hydrocortisone 50mg IV Q12H (Q8H 3/26-4/3; Q12H 4/4 - present); started in setting of refractory septic shock, now being titrated down    #Osteoporosis  - c/w raloxifene 60mg PO QD    #Neuropathy  - c/w gabapentin 400mg PO BID    #Down Syndrome  #Functional quadriplegia  Non-verbal at baseline. Wheelchair/bed bound. Followed by [facility].    DVT PPX: Lovenox 40mg SQ QD  GI PPX: pantoprazole 40mg PO QD  DIET: Pureed w/ Mildly Thick Liquids; NPO sinceMN  ACTIVITY: WBAT  CODE STATUS: Full Code  DISPOSITION: From detention (SIDDSO/OPWDD)    PENDING: OR w/ Podiatry today

## 2022-04-06 LAB
ALBUMIN SERPL ELPH-MCNC: 3.2 G/DL — LOW (ref 3.5–5.2)
ALP SERPL-CCNC: 96 U/L — SIGNIFICANT CHANGE UP (ref 30–115)
ALT FLD-CCNC: 11 U/L — SIGNIFICANT CHANGE UP (ref 0–41)
ANION GAP SERPL CALC-SCNC: 15 MMOL/L — HIGH (ref 7–14)
AST SERPL-CCNC: 21 U/L — SIGNIFICANT CHANGE UP (ref 0–41)
BASOPHILS # BLD AUTO: 0.03 K/UL — SIGNIFICANT CHANGE UP (ref 0–0.2)
BASOPHILS NFR BLD AUTO: 0.4 % — SIGNIFICANT CHANGE UP (ref 0–1)
BILIRUB SERPL-MCNC: 0.2 MG/DL — SIGNIFICANT CHANGE UP (ref 0.2–1.2)
BUN SERPL-MCNC: 12 MG/DL — SIGNIFICANT CHANGE UP (ref 10–20)
CALCIUM SERPL-MCNC: 8.4 MG/DL — LOW (ref 8.5–10.1)
CHLORIDE SERPL-SCNC: 99 MMOL/L — SIGNIFICANT CHANGE UP (ref 98–110)
CO2 SERPL-SCNC: 27 MMOL/L — SIGNIFICANT CHANGE UP (ref 17–32)
CREAT SERPL-MCNC: 0.7 MG/DL — SIGNIFICANT CHANGE UP (ref 0.7–1.5)
EGFR: 102 ML/MIN/1.73M2 — SIGNIFICANT CHANGE UP
EOSINOPHIL # BLD AUTO: 0.01 K/UL — SIGNIFICANT CHANGE UP (ref 0–0.7)
EOSINOPHIL NFR BLD AUTO: 0.1 % — SIGNIFICANT CHANGE UP (ref 0–8)
GLUCOSE SERPL-MCNC: 115 MG/DL — HIGH (ref 70–99)
HCT VFR BLD CALC: 41.4 % — SIGNIFICANT CHANGE UP (ref 37–47)
HGB BLD-MCNC: 14.2 G/DL — SIGNIFICANT CHANGE UP (ref 12–16)
IMM GRANULOCYTES NFR BLD AUTO: 0.3 % — SIGNIFICANT CHANGE UP (ref 0.1–0.3)
LYMPHOCYTES # BLD AUTO: 2.68 K/UL — SIGNIFICANT CHANGE UP (ref 1.2–3.4)
LYMPHOCYTES # BLD AUTO: 36.5 % — SIGNIFICANT CHANGE UP (ref 20.5–51.1)
MCHC RBC-ENTMCNC: 32.1 PG — HIGH (ref 27–31)
MCHC RBC-ENTMCNC: 34.3 G/DL — SIGNIFICANT CHANGE UP (ref 32–37)
MCV RBC AUTO: 93.5 FL — SIGNIFICANT CHANGE UP (ref 81–99)
MONOCYTES # BLD AUTO: 0.5 K/UL — SIGNIFICANT CHANGE UP (ref 0.1–0.6)
MONOCYTES NFR BLD AUTO: 6.8 % — SIGNIFICANT CHANGE UP (ref 1.7–9.3)
NEUTROPHILS # BLD AUTO: 4.1 K/UL — SIGNIFICANT CHANGE UP (ref 1.4–6.5)
NEUTROPHILS NFR BLD AUTO: 55.9 % — SIGNIFICANT CHANGE UP (ref 42.2–75.2)
NRBC # BLD: 0 /100 WBCS — SIGNIFICANT CHANGE UP (ref 0–0)
PLATELET # BLD AUTO: 361 K/UL — SIGNIFICANT CHANGE UP (ref 130–400)
POTASSIUM SERPL-MCNC: 3.2 MMOL/L — LOW (ref 3.5–5)
POTASSIUM SERPL-SCNC: 3.2 MMOL/L — LOW (ref 3.5–5)
PROT SERPL-MCNC: 5.9 G/DL — LOW (ref 6–8)
RBC # BLD: 4.43 M/UL — SIGNIFICANT CHANGE UP (ref 4.2–5.4)
RBC # FLD: 13.7 % — SIGNIFICANT CHANGE UP (ref 11.5–14.5)
SARS-COV-2 RNA SPEC QL NAA+PROBE: SIGNIFICANT CHANGE UP
SODIUM SERPL-SCNC: 141 MMOL/L — SIGNIFICANT CHANGE UP (ref 135–146)
WBC # BLD: 7.34 K/UL — SIGNIFICANT CHANGE UP (ref 4.8–10.8)
WBC # FLD AUTO: 7.34 K/UL — SIGNIFICANT CHANGE UP (ref 4.8–10.8)

## 2022-04-06 PROCEDURE — 99232 SBSQ HOSP IP/OBS MODERATE 35: CPT | Mod: GC

## 2022-04-06 RX ORDER — POTASSIUM CHLORIDE 20 MEQ
40 PACKET (EA) ORAL EVERY 4 HOURS
Refills: 0 | Status: COMPLETED | OUTPATIENT
Start: 2022-04-06 | End: 2022-04-06

## 2022-04-06 RX ADMIN — RALOXIFENE HYDROCHLORIDE 60 MILLIGRAM(S): 60 TABLET, COATED ORAL at 11:25

## 2022-04-06 RX ADMIN — Medication 1 APPLICATION(S): at 17:42

## 2022-04-06 RX ADMIN — MIDODRINE HYDROCHLORIDE 10 MILLIGRAM(S): 2.5 TABLET ORAL at 22:24

## 2022-04-06 RX ADMIN — Medication 50 MILLIGRAM(S): at 17:23

## 2022-04-06 RX ADMIN — MIDODRINE HYDROCHLORIDE 10 MILLIGRAM(S): 2.5 TABLET ORAL at 13:21

## 2022-04-06 RX ADMIN — Medication 40 MILLIEQUIVALENT(S): at 09:28

## 2022-04-06 RX ADMIN — ENOXAPARIN SODIUM 40 MILLIGRAM(S): 100 INJECTION SUBCUTANEOUS at 12:14

## 2022-04-06 RX ADMIN — Medication 100 MILLIGRAM(S): at 13:25

## 2022-04-06 RX ADMIN — GABAPENTIN 300 MILLIGRAM(S): 400 CAPSULE ORAL at 05:23

## 2022-04-06 RX ADMIN — Medication 100 MILLIGRAM(S): at 22:23

## 2022-04-06 RX ADMIN — MUPIROCIN 1 APPLICATION(S): 20 OINTMENT TOPICAL at 17:24

## 2022-04-06 RX ADMIN — MUPIROCIN 1 APPLICATION(S): 20 OINTMENT TOPICAL at 05:24

## 2022-04-06 RX ADMIN — GABAPENTIN 300 MILLIGRAM(S): 400 CAPSULE ORAL at 17:43

## 2022-04-06 RX ADMIN — Medication 50 MILLIGRAM(S): at 06:21

## 2022-04-06 RX ADMIN — Medication 40 MILLIEQUIVALENT(S): at 11:21

## 2022-04-06 RX ADMIN — Medication 100 MILLIGRAM(S): at 06:20

## 2022-04-06 RX ADMIN — PANTOPRAZOLE SODIUM 40 MILLIGRAM(S): 20 TABLET, DELAYED RELEASE ORAL at 11:25

## 2022-04-06 RX ADMIN — Medication 5 MILLIGRAM(S): at 22:23

## 2022-04-06 RX ADMIN — Medication 1 TABLET(S): at 11:23

## 2022-04-06 RX ADMIN — MIDODRINE HYDROCHLORIDE 10 MILLIGRAM(S): 2.5 TABLET ORAL at 05:24

## 2022-04-06 NOTE — SWALLOW BEDSIDE ASSESSMENT ADULT - NS SPL SWALLOW CLINIC TRIAL FT
+toleration of puree, moderately thick and mildly thick liquids w/o overt s/s of penetration/aspiration
Overt s/s aspiration across liquid trials. Immediate strong cough with the following trials: thin liquid trial, mildly thick via tsp 2/4 trials, mildly thick via cup sip 1/2 trials, and mildly thick via cup sip.   + toleration observed without overt symptoms of penetration/aspiration with Puree.
+observed toleration of lunch tray w/o overt s/s of penetration/aspiration
+toleration

## 2022-04-06 NOTE — PROGRESS NOTE ADULT - SUBJECTIVE AND OBJECTIVE BOX
************************************************  Rodolfo Hutson MD (PGY-1)  Spectra: [phone]  ************************************************    SUBJECTIVE / OVERNIGHT EVENTS  Pt scheduled for OR yesterday w/ Podiatry, but case cancelled and rescheduled for Friday 4/8. Patient does not report fevers, chills, CP, SOB, or n/v/d    MEDICATIONS  ceFAZolin   IVPB 2000 milliGRAM(s) IV Intermittent every 8 hours  collagenase Ointment 1 Application(s) Topical two times a day  enoxaparin Injectable 40 milliGRAM(s) SubCutaneous every 24 hours  gabapentin Solution 300 milliGRAM(s) Oral every 12 hours  hydrocortisone sodium succinate Injectable 50 milliGRAM(s) IV Push every 12 hours  lactated ringers. 1000 milliLiter(s) IV Continuous <Continuous>  melatonin 5 milliGRAM(s) Oral at bedtime  midodrine. 10 milliGRAM(s) Oral every 8 hours  multivitamin 1 Tablet(s) Oral daily  mupirocin 2% Ointment 1 Application(s) Topical two times a day  pantoprazole   Suspension 40 milliGRAM(s) Oral daily  raloxifene 60 milliGRAM(s) Oral daily      VITALS /  EXAM    T(C): 36.2 (04-06-22 @ 05:54), Max: 36.6 (04-05-22 @ 20:36)  HR: 52 (04-06-22 @ 05:54) (52 - 58)  BP: 110/60 (04-06-22 @ 05:54) (107/62 - 121/78)  RR: 18 (04-06-22 @ 05:54) (18 - 18)  SpO2: 98% (04-05-22 @ 20:36) (98% - 98%)    GENERAL: NAD, non-verbal  CHEST/LUNG: Clear to auscultation bilaterally; No wheezes, rales or rhonchi  HEART: Regular rate and rhythm; No murmurs, rubs, or gallops  ABDOMEN: Soft, Nontender, Nondistended; Bowel sounds present, no masses.  EXTREMITIES:  2+ Peripheral Pulses, No clubbing, cyanosis, or edema    LABS             14.2   7.34  )-----------( 361      ( 04-06-22 @ 06:45 )             41.4     143  |  102  |  10  -------------------------<  216   04-04-22 @ 20:00  3.6  |  26  |  0.8    Ca      8.4     04-04-22 @ 20:00  Mg     2.2     04-04-22 @ 20:00    TPro  5.9  /  Alb  3.1  /  TBili  <0.2  /  DBili  x   /  AST  18  /  ALT  16  /  AlkPhos  111  /  GGT  x     04-04-22 @ 20:00    PT/INR - ( 04-04-22 @ 20:00 )   PT: 11.60 sec;   INR: 1.01 ratio  PTT - ( 04-04-22 @ 20:00 )  PTT:28.8 sec    MICRO / IMAGING / CARDIOLOGY    Culture - Tissue with Gram Stain (collected 04-01-22 @ 17:23)  Source: .Tissue None  Gram Stain:    No polymorphonuclear leukocytes seen per low power field    No organisms seen seen per oil power field  Preliminary Report:    Moderate Staphylococcus lugdunensis    Rare Staphylococcus aureus    Few Enterococcus faecalis  Organism: Staphylococcus lugdunensis  Staphylococcus aureus  Enterococcus faecalis  Organism: Enterococcus faecalis      -  Ampicillin: S <=2 Predicts results to ampicillin/sulbactam, amoxacillin-clavulanate and  piperacillin-tazobactam.      -  Tetra/Doxy: S <=1      -  Vancomycin: S 4      Method Type: ZANE  Organism: Staphylococcus aureus      -  Ampicillin/Sulbactam: S <=8/4      -  Cefazolin: S <=4      -  Clindamycin: R <=0.25 This isolate is presumed to be clindamycin resistant based on detection of inducible resistance. Clindamycin may still be effective in some patients.      -  Erythromycin: R >4      -  Gentamicin: R >8 Should not be used as monotherapy      -  Oxacillin: S <=0.25      -  Penicillin: R 4      -  Rifampin: S <=1 Should not be used as monotherapy      -  Tetra/Doxy: S <=1      -  Trimethoprim/Sulfamethoxazole: S <=0.5/9.5      -  Vancomycin: S 2      Method Type: ZANE  Organism: Staphylococcus lugdunensis      -  Ampicillin/Sulbactam: S <=8/4      -  Cefazolin: S <=4      -  Clindamycin: S <=0.25      -  Erythromycin: S <=0.25      -  Gentamicin: S <=1 Should not be used as monotherapy      -  Oxacillin: S 0.5      -  Rifampin: S <=1 Should not be used as monotherapy      -  Tetra/Doxy: S <=1      -  Trimethoprim/Sulfamethoxazole: S <=0.5/9.5      -  Vancomycin: S 1      Method Type: ZANE

## 2022-04-06 NOTE — PROGRESS NOTE ADULT - SUBJECTIVE AND OBJECTIVE BOX
Podiatry Progress Note    Subjective:   TEA ECHAVARRIA is a pleasant well-nourished, well developed 56y Female in no acute distress, is alert, awake, and not oriented to person, place and time.  Patient is a 56y old  Female who presents with a chief complaint of Aspiration pneumonia (06 Apr 2022 08:09). Pt seen & evaluated at bedside. Sitter not at bedside.      PAST MEDICAL & SURGICAL HISTORY:  Down syndrome    Osteoporosis    Mild anemia    Neuropathy    S/P debridement  of R hip on 3/2/21         Objective:  Vital Signs Last 24 Hrs  T(C): 36.2 (06 Apr 2022 05:54), Max: 36.6 (05 Apr 2022 20:36)  T(F): 97.1 (06 Apr 2022 05:54), Max: 97.8 (05 Apr 2022 20:36)  HR: 52 (06 Apr 2022 05:54) (52 - 58)  BP: 110/60 (06 Apr 2022 05:54) (107/62 - 121/78)  BP(mean): 79 (05 Apr 2022 14:33) (79 - 79)  RR: 18 (06 Apr 2022 05:54) (18 - 18)  SpO2: 98% (05 Apr 2022 20:36) (98% - 98%)                        14.2   7.34  )-----------( 361      ( 06 Apr 2022 06:45 )             41.4                 04-06    141  |  99  |  12  ----------------------------<  115<H>  3.2<L>   |  27  |  0.7    Ca    8.4<L>      06 Apr 2022 06:45  Mg     2.2     04-04    TPro  5.9<L>  /  Alb  3.2<L>  /  TBili  0.2  /  DBili  x   /  AST  21  /  ALT  11  /  AlkPhos  96  04-06        Physical Exam - Lower Extremity Focused:   #Right Lower Extremity  Derm:   Right 1st MPJ wound; (+) PTB; Wound Base Granular; Dried sanguinous exudate to periwound; No active drainage/bleeding; No malodor;     Vascular: DP and PT Pulses Palpable; Foot is Warm to Warm to the touch   Neuro: Not assessable 2/2 Mental Status  MSK: Contracted; Further examination not assessable 2/2 Mental Status     Assessment:  Right Forefoot Plantar Ulcer (1st Submetatarsal head) - Probes to Bone  s/p Right foot 1st metatarsal head resection (4/1/22)    Plan:  Chart reviewed and Patient evaluated.   Wound Flushed w/ NS; Wound Dressed w/ Xeroform / DSD / Kerlix; q24  Local Wound Care; As Stated Above   A-Duplex B/L (3/29/22);   -Normal arterial flow in the visualized arteries of bilateral lower extremities.  -Bilateral peroneal arteries were not visualized.  -Difficult study due to patient positioning.  ID abx recs appreciated;   Need for Right Hallux amputation; Nonfunctional digit; osteomyelitis/septic arthritis at 1st MPTJ, with chronic nonhealing ulcer overlying 1st MPTJ.   Sx Tues 4/5 cancelled;  Will Reschedule to Fri 4/8/22 @ TBD; Will update when time/date finalized.  Excisional debridement of soft tissue and bone of the right foot w/hallux amputation, Right Foot  Please make pt NPO MN 4/7  Please order T&S and COAG 4/7  Please optimize pt lab values prior to OR.   Will contact Health Care Proxy for Consent; [phone]  Cardio Clearance appreciated; Moderate risk for MACE;  Weight bearing status; WBAT BL feet;   Discussed plan w/ Dr. Everett;     Podiatry

## 2022-04-06 NOTE — SWALLOW BEDSIDE ASSESSMENT ADULT - SPECIFY REASON(S)
dysphagia f/u
dysphagia f/u
dysphagia re-eval
dysphagia evaluation to determine safest and least restrictive diet

## 2022-04-06 NOTE — SWALLOW BEDSIDE ASSESSMENT ADULT - PHARYNGEAL PHASE
Within functional limits
Within functional limits
suspected decreased hyopharyngeal elevation via palpation/Decreased laryngeal elevation/Cough post oral intake/Throat clear post oral intake/Multiple swallows
Within functional limits

## 2022-04-06 NOTE — PROGRESS NOTE ADULT - ASSESSMENT
56F w/ h/o down syndrome, non-verbal, hypothyroidism, osteoporosis presents after multiple episodes of vomiting admitted for septic shock.    #Right Forefoot Plantar Ulcer, 1st MTP Joint  #Septic Arthritis vs Osteomyelitis  On 3/28, pt noted to have right forefoot ulcer. XR R Foot demonstrated findings consistent with septic arthritis/osteomyelitis at the first MTP joint. Wound Cx 3/28 demonstrated ZACHARY (S lugdunensis). Arterial duplex demonstrated normal arterial flow in b/l LE. ESR 40 and CRP 16. S/P excisional debridement on 4/1 w/ Podiatry Team.  - c/w cefazolin 2g IV Q8H  (3/31-present)  - Transition to Augmentin 875mg PO BID for 4 weeks on discharge  - OR today (4/8) for repeat debridement and possible 1st hallux amputation    #Septic shock s/p pressors POA (resolved)  #Acute hypoxic respiratory failure 2/2 Aspiration pneumonia/pneumonitis  #Acute cystitis  - MRSA positive  - CXR: Persistent left-sided opacities.  - F/u BCx and Urinary Cx. urine negative, no growth to date in blood  - F/u sputum culture. Strep/Legionella Ag both negative  - cont midodrine 10mg q8  - s/p 7 days Vancomycin and Cefepime   - c/w hydrocortisone 50mg IV Q12H (Q8H 3/26-4/3; Q12H 4/4 - present); started in setting of refractory septic shock, now being titrated down    #Osteoporosis  - c/w raloxifene 60mg PO QD    #Neuropathy  - c/w gabapentin 400mg PO BID    #Down Syndrome  #Functional quadriplegia  Non-verbal at baseline. Wheelchair/bed bound. Followed by [facility] [employer] [facility].    DVT PPX: Lovenox 40mg SQ QD  GI PPX: pantoprazole 40mg PO QD  DIET: Pureed w/ Mildly Thick Liquids  ACTIVITY: WBAT  CODE STATUS: Full Code  [facility] ([facility]    PENDING: OR w/ Podiatry on Friday (4/8)

## 2022-04-06 NOTE — PROGRESS NOTE ADULT - ATTENDING COMMENTS
Agree with above note   Septic arthritis of 1st MPJ   After debridement 1st toe right foot would benefit from amputation   Local wound care for now   will discuss with Proxy new plan
Agree with above note   will continue to follow up while in patient
Agree with note  patient pending surgery pending approval  DFU with OM   Discussed with    will follow with definitive plan for sx management
agree with above note   patient will benefit from amputation at this point   will discuss with proxy and obtain consent
Moderate risk for MACE  No further cardiac work-up is needed at the moment. There are no current cardiac contraindications to prevent from proceeding with the scheduled surgery/procedure.
agree with note  patient requires surgical intervention  Discussed with    awaiting approval

## 2022-04-06 NOTE — SWALLOW BEDSIDE ASSESSMENT ADULT - SWALLOW EVAL: CURRENT DIET
puree diet w/ mildly thick liquids
NPO pending speech/swallow eval
puree diet w/ thin liquids
puree only, no liquids

## 2022-04-06 NOTE — SWALLOW BEDSIDE ASSESSMENT ADULT - SWALLOW EVAL: RECOMMENDED FEEDING/EATING TECHNIQUES
oral hygiene/position upright (90 degrees)
allow for swallow between intakes/maintain upright posture during/after eating for 30 mins/position upright (90 degrees)
oral hygiene/position upright (90 degrees)
oral hygiene/position upright (90 degrees)

## 2022-04-06 NOTE — SWALLOW BEDSIDE ASSESSMENT ADULT - CONSISTENCIES ADMINISTERED
4oz/mildly thick/pureed
8oz/moderately thick/mildly thick/pureed
ice chip x1, 1/2 tsp thin x1, tsp mildly thick x4, cup sip mildly thick x2, straw sip mildly thick x1. puree via tsp 3oz/thin liquid/mildly thick/pureed
breakfast tray/mildly thick/pureed

## 2022-04-06 NOTE — SWALLOW BEDSIDE ASSESSMENT ADULT - SLP GENERAL OBSERVATIONS
Pt awake, nonverbal at baseline. Does not communicate wants/needs, does not follow commands. Occasional moaning. 98% spO2 via 2L NC. Low grade fever 99.9 this AM.
Pt received in bed asleep, woke to verbal stim, being fed by PCA
Pt received in bed being fed by RN/GH aide, Reported toleration of current po diet
Pt received in bed asleep, woke to verbal stim, reportedly tolerating puree diet, no liquids

## 2022-04-06 NOTE — SWALLOW BEDSIDE ASSESSMENT ADULT - SWALLOW EVAL: RECOMMENDED DIET
puree diet w/ mildly thick liquids
continue puree diet w/ thin liquids
puree diet w/ mildly thick liquids
Puree diet, NO liquids

## 2022-04-06 NOTE — SWALLOW BEDSIDE ASSESSMENT ADULT - SWALLOW EVAL: DIAGNOSIS
+observed toleration of lunch tray w/o overt s/s of penetration/aspiration
mild - mod oral dysphagia with s/s of pharyngeal dysphagia at bedside
+toleration of current diet of puree and mildly thick liquids w/o overt s/s of penetration/aspiration
+toleration of puree, moderately thick and mildly thick liquids w/o overt s/s of penetration/aspiration

## 2022-04-06 NOTE — SWALLOW BEDSIDE ASSESSMENT ADULT - ORAL PHASE
Delayed oral transit time
Decreased anterior-posterior movement of the bolus
Within functional limits/Delayed oral transit time
Within functional limits

## 2022-04-06 NOTE — SWALLOW BEDSIDE ASSESSMENT ADULT - ASR SWALLOW RECOMMEND DIAG
will plan for VFSS next week, pt planned for OR Friday w/ podiatry/VFSS/MBS
once pt medically optimized and transferred out of ICU will plan for VFSS prior to d/c
will plan for instrumental swallow study prior to d/c
Consider MBS assessment if s/s aspiration at bedside do not resolve/VFSS/MBS

## 2022-04-06 NOTE — PROGRESS NOTE ADULT - SUBJECTIVE AND OBJECTIVE BOX
Podiatry Progress Note    Subjective:   TEA ECHAVARRIA is a well-nourished, well developed 56y Female in no acute distress, alert awake, and not oriented to person, place and time.  Patient is a 56y old  Female who presents with a chief complaint of Aspiration pneumonia (06 Apr 2022 09:33). *Sx plan updated.*      PAST MEDICAL & SURGICAL HISTORY:  Down syndrome    Osteoporosis    Mild anemia    Neuropathy    S/P debridement  of R hip on 3/2/21         Objective:  Vital Signs Last 24 Hrs  T(C): 36.2 (06 Apr 2022 05:54), Max: 36.6 (05 Apr 2022 20:36)  T(F): 97.1 (06 Apr 2022 05:54), Max: 97.8 (05 Apr 2022 20:36)  HR: 52 (06 Apr 2022 05:54) (52 - 58)  BP: 110/60 (06 Apr 2022 05:54) (107/62 - 121/78)  BP(mean): 79 (05 Apr 2022 14:33) (79 - 79)  RR: 18 (06 Apr 2022 05:54) (18 - 18)  SpO2: 98% (05 Apr 2022 20:36) (98% - 98%)                        14.2   7.34  )-----------( 361      ( 06 Apr 2022 06:45 )             41.4                 04-06    141  |  99  |  12  ----------------------------<  115<H>  3.2<L>   |  27  |  0.7    Ca    8.4<L>      06 Apr; she  2022 06:45  Mg     2.2     04-04    TPro  5.9<L>  /  Alb  3.2<L>  /  TBili  0.2  /  DBili  x   /  AST  21  /  ALT  11  /  AlkPhos  96  04-06        Assessment:  Right Forefoot Plantar Ulcer (1st Submetatarsal head) - Probes to Bone  s/p Right foot 1st metatarsal head resection (4/1/22)    Plan:  Chart reviewed;  Need for Right Hallux amputation; Nonfunctional digit; osteomyelitis/septic arthritis at 1st MPTJ, with chronic nonhealing ulcer overlying 1st MPTJ.   Dr. Everett (Surgeon) & Dr. Lorenzana (witness) spoke with Health Care Proxy La Nena Mcfarland and obtained Consent; [phone], Cell: [phone]; Ms. Mcfarland spoke w/Board yesterday and they approved sx;  Sx Scheduled Fri 4/8/22 @ TBD; Will update when time finalized; Excisional debridement of soft tissue and bone of the right foot w/hallux amputation, Right Foot  Please make pt NPO MN 4/7  Please order T&S and COAG 4/7  Please optimize pt lab values prior to OR.   Cardio Clearance appreciated; Moderate risk for MACE;  Weight bearing status; WBAT BL feet;   Discussed plan w/ Dr. Everett;       Podiatry

## 2022-04-06 NOTE — SWALLOW BEDSIDE ASSESSMENT ADULT - SLP PERTINENT HISTORY OF CURRENT PROBLEM
57y/o female with a pmhx of down syndrome, non-verbal, hypothyroidism, osteoporosis coming from [facility] here for eval multiple episodes of vomiting that began today. Pt was also found to be hypoxic and febrile. History was obtained from aid at bedside. This morning patient "vomited a lot", NBNB, then was found to be hypotensive and hypoxic to 86% on RA. Patient had been acting her usual self until then with no indication of distress. At baseline she sits in wheelchair, is nonverbal, and does not communicate in any way.
55y/o female with a pmhx of down syndrome, non-verbal, hypothyroidism, osteoporosis coming from [facility] here for eval multiple episodes of vomiting that began today. Pt was also found to be hypoxic and febrile. History was obtained from aid at bedside. This morning patient "vomited a lot", NBNB, then was found to be hypotensive and hypoxic to 86% on RA. Patient had been acting her usual self until then with no indication of distress. At baseline she sits in wheelchair, is nonverbal, and does not communicate in any way.
55y/o female with a pmhx of down syndrome, non-verbal, hypothyroidism, osteoporosis coming from [facility] here for eval multiple episodes of vomiting that began today. Pt was also found to be hypoxic and febrile. History was obtained from aid at bedside. This morning patient "vomited a lot", NBNB, then was found to be hypotensive and hypoxic to 86% on RA. Patient had been acting her usual self until then with no indication of distress. At baseline she sits in wheelchair, is nonverbal, and does not communicate in any way.
55y/o female with a pmhx of down syndrome, non-verbal, hypothyroidism, osteoporosis coming from [facility] here for eval multiple episodes of vomiting that began today. Pt was also found to be hypoxic and febrile. History was obtained from aid at bedside. This morning patient "vomited a lot", NBNB, then was found to be hypotensive and hypoxic to 86% on RA. Patient had been acting her usual self until then with no indication of distress. At baseline she sits in wheelchair, is nonverbal, and does not communicate in any way.

## 2022-04-06 NOTE — CHART NOTE - NSCHARTNOTEFT_GEN_A_CORE
Event     Sx Cancelled Rescheduled for Friday, 4/8     Excisional debridement of soft tissue and bone of the right foot with possible hallux amputation.     Please make pt NPO MN 4/7  Please order T&S and COAG 4/7  Please optimize pt lab values prior to OR.       [phone]  Podiatry.

## 2022-04-06 NOTE — PROGRESS NOTE ADULT - SUBJECTIVE AND OBJECTIVE BOX
TEA ECHAVARRIA  56y  Female  surgery was rescheduled  stable vitals  low k- replace    INTERVAL EVENTS:    T(C): 36.2 (04-06-22 @ 05:54), Max: 36.6 (04-05-22 @ 20:36)  HR: 52 (04-06-22 @ 05:54) (52 - 58)  BP: 110/60 (04-06-22 @ 05:54) (107/62 - 121/78)  RR: 18 (04-06-22 @ 05:54) (18 - 18)  SpO2: 98% (04-05-22 @ 20:36) (98% - 98%)  Wt(kg): --Vital Signs Last 24 Hrs  T(C): 36.2 (06 Apr 2022 05:54), Max: 36.6 (05 Apr 2022 20:36)  T(F): 97.1 (06 Apr 2022 05:54), Max: 97.8 (05 Apr 2022 20:36)  HR: 52 (06 Apr 2022 05:54) (52 - 58)  BP: 110/60 (06 Apr 2022 05:54) (107/62 - 121/78)  BP(mean): 79 (05 Apr 2022 14:33) (79 - 79)  RR: 18 (06 Apr 2022 05:54) (18 - 18)  SpO2: 98% (05 Apr 2022 20:36) (98% - 98%)    PHYSICAL EXAM:  GENERAL: resting comfortably  NECK: Supple, No JVD, Normal thyroid  CHEST/LUNG: Clear; No crackles or wheezing  HEART: S1, S2, Regular rate and rhythm;   ABDOMEN: Soft, Nontender, Nondistended; Bowel sounds present  EXTREMITIES: No clubbing, cyanosis, or edema  SKIN: chronic lesions  LABS:                          14.2   7.34  )-----------( 361      ( 06 Apr 2022 06:45 )             41.4     04-06    141  |  99  |  12  ----------------------------<  115<H>  3.2<L>   |  27  |  0.7    Ca    8.4<L>      06 Apr 2022 06:45  Mg     2.2     04-04    TPro  5.9<L>  /  Alb  3.2<L>  /  TBili  0.2  /  DBili  x   /  AST  21  /  ALT  11  /  AlkPhos  96  04-06    PT/INR - ( 04 Apr 2022 20:00 )   PT: 11.60 sec;   INR: 1.01 ratio         PTT - ( 04 Apr 2022 20:00 )  PTT:28.8 sec          ceFAZolin   IVPB 2000 milliGRAM(s) IV Intermittent every 8 hours  collagenase Ointment 1 Application(s) Topical two times a day  enoxaparin Injectable 40 milliGRAM(s) SubCutaneous every 24 hours  gabapentin Solution 300 milliGRAM(s) Oral every 12 hours  hydrocortisone sodium succinate Injectable 50 milliGRAM(s) IV Push every 12 hours  melatonin 5 milliGRAM(s) Oral at bedtime  midodrine. 10 milliGRAM(s) Oral every 8 hours  multivitamin 1 Tablet(s) Oral daily  mupirocin 2% Ointment 1 Application(s) Topical two times a day  pantoprazole   Suspension 40 milliGRAM(s) Oral daily  potassium chloride   Powder 40 milliEquivalent(s) Oral every 4 hours  raloxifene 60 milliGRAM(s) Oral daily      RADIOLOGY & ADDITIONAL TESTS:    case discussed with the resident  Available consult notes reviewed    Assessment and plan:       continue antibiotics  continue weaning steroids  medically stable for surgery w moderate cv risk  replace k  i am on vacation from 4/7-4/12- hospitalist team following

## 2022-04-07 LAB
ALBUMIN SERPL ELPH-MCNC: 3.6 G/DL — SIGNIFICANT CHANGE UP (ref 3.5–5.2)
ALP SERPL-CCNC: 110 U/L — SIGNIFICANT CHANGE UP (ref 30–115)
ALT FLD-CCNC: 10 U/L — SIGNIFICANT CHANGE UP (ref 0–41)
ANION GAP SERPL CALC-SCNC: 9 MMOL/L — SIGNIFICANT CHANGE UP (ref 7–14)
APTT BLD: 28.5 SEC — SIGNIFICANT CHANGE UP (ref 27–39.2)
AST SERPL-CCNC: 17 U/L — SIGNIFICANT CHANGE UP (ref 0–41)
BASOPHILS # BLD AUTO: 0.04 K/UL — SIGNIFICANT CHANGE UP (ref 0–0.2)
BASOPHILS NFR BLD AUTO: 0.5 % — SIGNIFICANT CHANGE UP (ref 0–1)
BILIRUB SERPL-MCNC: <0.2 MG/DL — SIGNIFICANT CHANGE UP (ref 0.2–1.2)
BLD GP AB SCN SERPL QL: SIGNIFICANT CHANGE UP
BUN SERPL-MCNC: 15 MG/DL — SIGNIFICANT CHANGE UP (ref 10–20)
CALCIUM SERPL-MCNC: 8.9 MG/DL — SIGNIFICANT CHANGE UP (ref 8.5–10.1)
CHLORIDE SERPL-SCNC: 101 MMOL/L — SIGNIFICANT CHANGE UP (ref 98–110)
CO2 SERPL-SCNC: 29 MMOL/L — SIGNIFICANT CHANGE UP (ref 17–32)
CREAT SERPL-MCNC: 0.7 MG/DL — SIGNIFICANT CHANGE UP (ref 0.7–1.5)
CULTURE RESULTS: SIGNIFICANT CHANGE UP
EGFR: 102 ML/MIN/1.73M2 — SIGNIFICANT CHANGE UP
EOSINOPHIL # BLD AUTO: 0.06 K/UL — SIGNIFICANT CHANGE UP (ref 0–0.7)
EOSINOPHIL NFR BLD AUTO: 0.8 % — SIGNIFICANT CHANGE UP (ref 0–8)
GLUCOSE SERPL-MCNC: 113 MG/DL — HIGH (ref 70–99)
HCG SERPL QL: NEGATIVE — SIGNIFICANT CHANGE UP
HCT VFR BLD CALC: 44.2 % — SIGNIFICANT CHANGE UP (ref 37–47)
HGB BLD-MCNC: 14.9 G/DL — SIGNIFICANT CHANGE UP (ref 12–16)
IMM GRANULOCYTES NFR BLD AUTO: 0.4 % — HIGH (ref 0.1–0.3)
INR BLD: 0.99 RATIO — SIGNIFICANT CHANGE UP (ref 0.65–1.3)
LYMPHOCYTES # BLD AUTO: 2.11 K/UL — SIGNIFICANT CHANGE UP (ref 1.2–3.4)
LYMPHOCYTES # BLD AUTO: 28.9 % — SIGNIFICANT CHANGE UP (ref 20.5–51.1)
MAGNESIUM SERPL-MCNC: 2.3 MG/DL — SIGNIFICANT CHANGE UP (ref 1.8–2.4)
MCHC RBC-ENTMCNC: 32.1 PG — HIGH (ref 27–31)
MCHC RBC-ENTMCNC: 33.7 G/DL — SIGNIFICANT CHANGE UP (ref 32–37)
MCV RBC AUTO: 95.3 FL — SIGNIFICANT CHANGE UP (ref 81–99)
MONOCYTES # BLD AUTO: 0.45 K/UL — SIGNIFICANT CHANGE UP (ref 0.1–0.6)
MONOCYTES NFR BLD AUTO: 6.2 % — SIGNIFICANT CHANGE UP (ref 1.7–9.3)
NEUTROPHILS # BLD AUTO: 4.62 K/UL — SIGNIFICANT CHANGE UP (ref 1.4–6.5)
NEUTROPHILS NFR BLD AUTO: 63.2 % — SIGNIFICANT CHANGE UP (ref 42.2–75.2)
NRBC # BLD: 0 /100 WBCS — SIGNIFICANT CHANGE UP (ref 0–0)
ORGANISM # SPEC MICROSCOPIC CNT: SIGNIFICANT CHANGE UP
PLATELET # BLD AUTO: 351 K/UL — SIGNIFICANT CHANGE UP (ref 130–400)
POTASSIUM SERPL-MCNC: 4.3 MMOL/L — SIGNIFICANT CHANGE UP (ref 3.5–5)
POTASSIUM SERPL-SCNC: 4.3 MMOL/L — SIGNIFICANT CHANGE UP (ref 3.5–5)
PROT SERPL-MCNC: 6.3 G/DL — SIGNIFICANT CHANGE UP (ref 6–8)
PROTHROM AB SERPL-ACNC: 11.4 SEC — SIGNIFICANT CHANGE UP (ref 9.95–12.87)
RBC # BLD: 4.64 M/UL — SIGNIFICANT CHANGE UP (ref 4.2–5.4)
RBC # FLD: 14.5 % — SIGNIFICANT CHANGE UP (ref 11.5–14.5)
SODIUM SERPL-SCNC: 139 MMOL/L — SIGNIFICANT CHANGE UP (ref 135–146)
SPECIMEN SOURCE: SIGNIFICANT CHANGE UP
WBC # BLD: 7.31 K/UL — SIGNIFICANT CHANGE UP (ref 4.8–10.8)
WBC # FLD AUTO: 7.31 K/UL — SIGNIFICANT CHANGE UP (ref 4.8–10.8)

## 2022-04-07 PROCEDURE — 99233 SBSQ HOSP IP/OBS HIGH 50: CPT

## 2022-04-07 RX ORDER — HYDROCORTISONE 20 MG
50 TABLET ORAL EVERY 24 HOURS
Refills: 0 | Status: DISCONTINUED | OUTPATIENT
Start: 2022-04-08 | End: 2022-04-08

## 2022-04-07 RX ORDER — SODIUM CHLORIDE 9 MG/ML
1000 INJECTION, SOLUTION INTRAVENOUS
Refills: 0 | Status: DISCONTINUED | OUTPATIENT
Start: 2022-04-08 | End: 2022-04-08

## 2022-04-07 RX ADMIN — Medication 1 TABLET(S): at 11:44

## 2022-04-07 RX ADMIN — Medication 5 MILLIGRAM(S): at 21:23

## 2022-04-07 RX ADMIN — MIDODRINE HYDROCHLORIDE 10 MILLIGRAM(S): 2.5 TABLET ORAL at 21:23

## 2022-04-07 RX ADMIN — RALOXIFENE HYDROCHLORIDE 60 MILLIGRAM(S): 60 TABLET, COATED ORAL at 11:44

## 2022-04-07 RX ADMIN — MUPIROCIN 1 APPLICATION(S): 20 OINTMENT TOPICAL at 17:33

## 2022-04-07 RX ADMIN — MIDODRINE HYDROCHLORIDE 10 MILLIGRAM(S): 2.5 TABLET ORAL at 13:19

## 2022-04-07 RX ADMIN — GABAPENTIN 300 MILLIGRAM(S): 400 CAPSULE ORAL at 17:33

## 2022-04-07 RX ADMIN — Medication 100 MILLIGRAM(S): at 13:19

## 2022-04-07 RX ADMIN — Medication 100 MILLIGRAM(S): at 05:21

## 2022-04-07 RX ADMIN — PANTOPRAZOLE SODIUM 40 MILLIGRAM(S): 20 TABLET, DELAYED RELEASE ORAL at 11:44

## 2022-04-07 RX ADMIN — ENOXAPARIN SODIUM 40 MILLIGRAM(S): 100 INJECTION SUBCUTANEOUS at 11:44

## 2022-04-07 RX ADMIN — Medication 1 APPLICATION(S): at 07:23

## 2022-04-07 RX ADMIN — MUPIROCIN 1 APPLICATION(S): 20 OINTMENT TOPICAL at 05:21

## 2022-04-07 RX ADMIN — Medication 100 MILLIGRAM(S): at 21:23

## 2022-04-07 RX ADMIN — MIDODRINE HYDROCHLORIDE 10 MILLIGRAM(S): 2.5 TABLET ORAL at 05:21

## 2022-04-07 RX ADMIN — Medication 1 APPLICATION(S): at 17:32

## 2022-04-07 RX ADMIN — Medication 50 MILLIGRAM(S): at 05:22

## 2022-04-07 RX ADMIN — GABAPENTIN 300 MILLIGRAM(S): 400 CAPSULE ORAL at 07:24

## 2022-04-07 NOTE — PROGRESS NOTE ADULT - SUBJECTIVE AND OBJECTIVE BOX
************************************************  Rodolfo Hutson MD (PGY-1)  Spectra: [phone]  ************************************************    SUBJECTIVE / OVERNIGHT EVENTS  Unable to interview due to patient's current mental status. No acute overnight events reported by clinical staff this AM. Scheduled for OR right hallux debridement w/ possible amputation tomorrow w/ podiatry. No reported fevers, chills, respiratory distress, vomiting, or diarrhea.    MEDICATIONS  ceFAZolin   IVPB 2000 milliGRAM(s) IV Intermittent every 8 hours  collagenase Ointment 1 Application(s) Topical two times a day  enoxaparin Injectable 40 milliGRAM(s) SubCutaneous every 24 hours  gabapentin Solution 300 milliGRAM(s) Oral every 12 hours  hydrocortisone sodium succinate Injectable 50 milliGRAM(s) IV Push every 12 hours  melatonin 5 milliGRAM(s) Oral at bedtime  midodrine. 10 milliGRAM(s) Oral every 8 hours  multivitamin 1 Tablet(s) Oral daily  mupirocin 2% Ointment 1 Application(s) Topical two times a day  pantoprazole   Suspension 40 milliGRAM(s) Oral daily  raloxifene 60 milliGRAM(s) Oral daily    VITALS /  EXAM    T(C): 36.4 (04-07-22 @ 05:17), Max: 36.5 (04-06-22 @ 13:09)  HR: 50 (04-07-22 @ 05:17) (50 - 69)  BP: 128/58 (04-07-22 @ 05:17) (84/63 - 128/58)  RR: 18 (04-07-22 @ 05:17) (18 - 18)  SpO2: 95% (04-07-22 @ 05:17) (95% - 96%)    GENERAL: NAD, non-verbal  CHEST/LUNG: Clear to auscultation bilaterally; No wheezes, rales or rhonchi  HEART: Regular rate and rhythm; No murmurs, rubs, or gallops  ABDOMEN: Soft, Nontender, Nondistended; Bowel sounds present, no masses.  EXTREMITIES:  2+ Peripheral Pulses, No clubbing, cyanosis, or edema    LABS             14.9   7.31  )-----------( 351      ( 04-07-22 @ 08:00 )             44.2     139  |  101  |  15  -------------------------<  113   04-07-22 @ 08:00  4.3  |  29  |  0.7    Ca      8.9     04-07-22 @ 08:00  Mg     2.3     04-07-22 @ 08:00    TPro  6.3  /  Alb  3.6  /  TBili  <0.2  /  DBili  x   /  AST  17  /  ALT  10  /  AlkPhos  110  /  GGT  x     04-07-22 @ 08:00    PT/INR - ( 04-07-22 @ 08:00 )   PT: 11.40 sec;   INR: 0.99 ratio  PTT - ( 04-07-22 @ 08:00 )  PTT:28.5 sec

## 2022-04-07 NOTE — PROGRESS NOTE ADULT - ASSESSMENT
56F w/ h/o down syndrome, non-verbal, hypothyroidism, osteoporosis presents after multiple episodes of vomiting admitted for septic shock.    #Right Forefoot Plantar Ulcer, 1st MTP Joint  #Septic Arthritis vs Osteomyelitis  On 3/28, pt noted to have right forefoot ulcer. XR R Foot demonstrated findings consistent with septic arthritis/osteomyelitis at the first MTP joint. Wound Cx 3/28 demonstrated ZACHARY (S lugdunensis). Arterial duplex demonstrated normal arterial flow in b/l LE. ESR 40 and CRP 16. S/P excisional debridement on 4/1 w/ Podiatry Team.  - c/w cefazolin 2g IV Q8H  (3/31-present)  - Transition to Augmentin 875mg PO BID for 4 weeks on discharge  - OR tomorrow (4/8) for repeat debridement and possible 1st hallux amputation    #Septic shock s/p pressors POA (resolved)  #Acute hypoxic respiratory failure 2/2 Aspiration pneumonia/pneumonitis  #Acute cystitis  - MRSA positive  - CXR: Persistent left-sided opacities.  - F/u BCx and Urinary Cx. urine negative, no growth to date in blood  - F/u sputum culture. Strep/Legionella Ag both negative  - cont midodrine 10mg q8  - s/p 7 days Vancomycin and Cefepime   - decrease hydrocortisone 50mg IV from Q12H to Q24H (Q8H 3/26-4/3; Q12H 4/4 - 4/6; Q24H); started in setting of refractory septic shock, now being titrated down    #Osteoporosis  - c/w raloxifene 60mg PO QD    #Neuropathy  - c/w gabapentin 400mg PO BID    #Down Syndrome  #Functional quadriplegia  Non-verbal at baseline. Wheelchair/bed bound. Followed by Community Advisory Board.    DVT PPX: Lovenox 40mg SQ QD  GI PPX: pantoprazole 40mg PO QD  DIET: Pureed w/ Mildly Thick Liquids; NPO after MN  ACTIVITY: WBAT  CODE STATUS: Full Code  DISPOSITION: From [facility] ([facility]O/OPWDD)    PENDING: OR w/ Podiatry tomorrow (4/8)

## 2022-04-07 NOTE — PROGRESS NOTE ADULT - SUBJECTIVE AND OBJECTIVE BOX
Patient is a 56y old  Female who presents with a chief complaint of Aspiration pneumonia (07 Apr 2022 10:05)    INTERVAL HPI/OVERNIGHT EVENTS: Patient was examined and seen at bedside. This morning pt is resting comfortably in bed and staff report no new issues or overnight events.   ROS: unable to access due to baseline mental status    InitialHPI:  55y/o female with a pmhx of down syndrome, non-verbal, hypothyroidism, osteoporosis coming from [facility] here for eval multiple episodes of vomiting that began today. Pt was also found to be hypoxic and febrile. History was obtained from aid at bedside. This morning patient "vomited a lot", NBNB, then was found to be hypotensive and hypoxic to 86% on RA. Patient had been acting her usual self until then with no indication of distress. At baseline she sits in wheelchair, is nonverbal, and does not communicate in any way.    ED course: T 101, , BP min 71/36, 94% on 4L. UA positive. Initial lactate 2.2. CT showed bilateral interstitial pulmonary thickening, which may reflect aspiration pneumonitis, and bladder wall thickening.  She received azithromycin, ceftriaxone and 2.5L LR. Lactate improved to 1.8 but BP did not, so she was started on levophed.      Family: (foster mother): Ana Katz, [phone]  Consumer advisory board: Amy Rich, [phone]     (24 Mar 2022 20:31)    PAST MEDICAL & SURGICAL HISTORY:  Down syndrome    Osteoporosis    Mild anemia    Neuropathy    S/P debridement  of R hip on 3/2/21        General: NAD, alert not following commands  HEENT:  EOMI, no LAD  CV: S1 S2  Resp: decreased breath sounds at bases  GI: NT/ND/S +BS  MS: no clubbing/cyanosis/edema, + pulses b/l;  RLE c/d/i dsg  Neuro: unable to access    MEDICATIONS  (STANDING):  ceFAZolin   IVPB 2000 milliGRAM(s) IV Intermittent every 8 hours  collagenase Ointment 1 Application(s) Topical two times a day  enoxaparin Injectable 40 milliGRAM(s) SubCutaneous every 24 hours  gabapentin Solution 300 milliGRAM(s) Oral every 12 hours  melatonin 5 milliGRAM(s) Oral at bedtime  midodrine. 10 milliGRAM(s) Oral every 8 hours  multivitamin 1 Tablet(s) Oral daily  mupirocin 2% Ointment 1 Application(s) Topical two times a day  pantoprazole   Suspension 40 milliGRAM(s) Oral daily  raloxifene 60 milliGRAM(s) Oral daily    MEDICATIONS  (PRN):    Vital Signs Last 24 Hrs  T(C): 36.3 (07 Apr 2022 13:17), Max: 36.4 (07 Apr 2022 05:17)  T(F): 97.4 (07 Apr 2022 13:17), Max: 97.6 (07 Apr 2022 05:17)  HR: 61 (07 Apr 2022 13:17) (50 - 64)  BP: 86/48 (07 Apr 2022 13:17) (86/48 - 128/58)  BP(mean): 61 (07 Apr 2022 13:17) (61 - 76)  RR: 18 (07 Apr 2022 13:17) (18 - 18)  SpO2: 95% (07 Apr 2022 14:20) (88% - 96%)  CAPILLARY BLOOD GLUCOSE                              14.9   7.31  )-----------( 351      ( 07 Apr 2022 08:00 )             44.2     04-07    139  |  101  |  15  ----------------------------<  113<H>  4.3   |  29  |  0.7    Ca    8.9      07 Apr 2022 08:00  Mg     2.3     04-07    TPro  6.3  /  Alb  3.6  /  TBili  <0.2  /  DBili  x   /  AST  17  /  ALT  10  /  AlkPhos  110  04-07    LIVER FUNCTIONS - ( 07 Apr 2022 08:00 )  Alb: 3.6 g/dL / Pro: 6.3 g/dL / ALK PHOS: 110 U/L / ALT: 10 U/L / AST: 17 U/L / GGT: x               PT/INR - ( 07 Apr 2022 08:00 )   PT: 11.40 sec;   INR: 0.99 ratio         PTT - ( 07 Apr 2022 08:00 )  PTT:28.5 sec            Chart, Consultant(s) Notes Reviewed:  [x ] YES  [ ] NO  Care Discussed with Consultants/Other Providers/ Housestaff [ x] YES  [ ] NO  Radiology, labs, old available records personally reviewed.

## 2022-04-07 NOTE — PROGRESS NOTE ADULT - ASSESSMENT
56F w/ h/o down syndrome, non-verbal, hypothyroidism, osteoporosis presents after multiple episodes of vomiting admitted for septic shock.    #Right Forefoot Plantar Ulcer, 1st MTP Joint  #Septic Arthritis vs Osteomyelitis  On 3/28, pt noted to have right forefoot ulcer. XR R Foot demonstrated findings consistent with septic arthritis/osteomyelitis at the first MTP joint. Wound Cx 3/28 demonstrated ZACHARY (S lugdunensis). Arterial duplex demonstrated normal arterial flow in b/l LE. ESR 40 and CRP 16. S/P excisional debridement on 4/1 w/ Podiatry Team.  - c/w cefazolin 2g IV Q8H  (3/31-present)  - Transition to Augmentin 875mg PO BID for 4 weeks on discharge  - OR tomorrow (4/8) for repeat debridement and possible 1st hallux amputation    #Septic shock s/p pressors POA (resolved)  #Acute hypoxic respiratory failure 2/2 Aspiration pneumonia/pneumonitis  #Acute cystitis  - MRSA positive  - CXR: Persistent left-sided opacities.  - F/u BCx and Urinary Cx. urine negative, no growth to date in blood  - F/u sputum culture. Strep/Legionella Ag both negative  - cont midodrine 10mg q8  - s/p 7 days Vancomycin and Cefepime   - c/w hydrocortisone 50mg IV Q12H (Q8H 3/26-4/3; Q12H 4/4 - 4/6; Q24H); started in setting of refractory septic shock, now being titrated down    #Osteoporosis  - c/w raloxifene 60mg PO QD    #Neuropathy  - c/w gabapentin 400mg PO BID    #Down Syndrome  #Functional quadriplegia  Non-verbal at baseline. Wheelchair/bed bound. Followed by [employer].    DVT PPX: Lovenox 40mg SQ QD  GI PPX: pantoprazole 40mg PO QD  DIET: Pureed w/ Mildly Thick Liquids; NPO after MN  ACTIVITY: WBAT  CODE STATUS: Full Code  DISPOSITION: From [facility] ([facility])    PENDING: OR w/ Podiatry tomorrow (4/8)   56F w/ h/o down syndrome, non-verbal, hypothyroidism, osteoporosis presents after multiple episodes of vomiting admitted for septic shock.    #Right Forefoot Plantar Ulcer, 1st MTP Joint  #Septic Arthritis vs Osteomyelitis  On 3/28, pt noted to have right forefoot ulcer. XR R Foot demonstrated findings consistent with septic arthritis/osteomyelitis at the first MTP joint. Wound Cx 3/28 demonstrated ZACHARY (S lugdunensis). Arterial duplex demonstrated normal arterial flow in b/l LE. ESR 40 and CRP 16. S/P excisional debridement on 4/1 w/ Podiatry Team.  - c/w cefazolin 2g IV Q8H  (3/31-present)  - Transition to Augmentin 875mg PO BID for 4 weeks on discharge  - OR tomorrow (4/8) for repeat debridement and possible 1st hallux amputation    #Septic shock s/p pressors POA (resolved)  #Acute hypoxic respiratory failure 2/2 Aspiration pneumonia/pneumonitis  #Acute cystitis  - MRSA positive  - CXR: Persistent left-sided opacities.  - F/u BCx and Urinary Cx. urine negative, no growth to date in blood  - F/u sputum culture. Strep/Legionella Ag both negative  - cont midodrine 10mg q8  - s/p 7 days Vancomycin and Cefepime   - decrease hydrocortisone 50mg IV from Q12H to Q24H (Q8H 3/26-4/3; Q12H 4/4 - 4/6; Q24H); started in setting of refractory septic shock, now being titrated down    #Osteoporosis  - c/w raloxifene 60mg PO QD    #Neuropathy  - c/w gabapentin 400mg PO BID    #Down Syndrome  #Functional quadriplegia  Non-verbal at baseline. Wheelchair/bed bound. Followed by [employer].    DVT PPX: Lovenox 40mg SQ QD  GI PPX: pantoprazole 40mg PO QD  DIET: Pureed w/ Mildly Thick Liquids; NPO after MN  ACTIVITY: WBAT  CODE STATUS: Full Code  DISPOSITION: From senior living ([facility])    PENDING: OR w/ Podiatry tomorrow (4/8)

## 2022-04-08 ENCOUNTER — RESULT REVIEW (OUTPATIENT)
Age: 57
End: 2022-04-08

## 2022-04-08 ENCOUNTER — TRANSCRIPTION ENCOUNTER (OUTPATIENT)
Age: 57
End: 2022-04-08

## 2022-04-08 PROCEDURE — 28810 AMPUTATION TOE & METATARSAL: CPT | Mod: 59,58

## 2022-04-08 PROCEDURE — 20240 BONE BIOPSY OPEN SUPERFICIAL: CPT | Mod: 59,58

## 2022-04-08 PROCEDURE — 88304 TISSUE EXAM BY PATHOLOGIST: CPT | Mod: 26

## 2022-04-08 PROCEDURE — 88311 DECALCIFY TISSUE: CPT | Mod: 26

## 2022-04-08 PROCEDURE — 14350 FILLETED FINGER/TOE FLAP: CPT | Mod: 59,58

## 2022-04-08 PROCEDURE — 99233 SBSQ HOSP IP/OBS HIGH 50: CPT

## 2022-04-08 PROCEDURE — 73630 X-RAY EXAM OF FOOT: CPT | Mod: 26,RT

## 2022-04-08 RX ORDER — SODIUM CHLORIDE 9 MG/ML
1000 INJECTION, SOLUTION INTRAVENOUS
Refills: 0 | Status: DISCONTINUED | OUTPATIENT
Start: 2022-04-08 | End: 2022-04-08

## 2022-04-08 RX ORDER — RALOXIFENE HYDROCHLORIDE 60 MG/1
60 TABLET, COATED ORAL DAILY
Refills: 0 | Status: DISCONTINUED | OUTPATIENT
Start: 2022-04-08 | End: 2022-04-11

## 2022-04-08 RX ORDER — ENOXAPARIN SODIUM 100 MG/ML
40 INJECTION SUBCUTANEOUS EVERY 24 HOURS
Refills: 0 | Status: DISCONTINUED | OUTPATIENT
Start: 2022-04-08 | End: 2022-04-11

## 2022-04-08 RX ORDER — ACETAMINOPHEN 500 MG
650 TABLET ORAL EVERY 6 HOURS
Refills: 0 | Status: DISCONTINUED | OUTPATIENT
Start: 2022-04-08 | End: 2022-04-11

## 2022-04-08 RX ORDER — HYDROCORTISONE 20 MG
50 TABLET ORAL EVERY 24 HOURS
Refills: 0 | Status: DISCONTINUED | OUTPATIENT
Start: 2022-04-08 | End: 2022-04-08

## 2022-04-08 RX ORDER — HYDROCORTISONE 20 MG
50 TABLET ORAL ONCE
Refills: 0 | Status: COMPLETED | OUTPATIENT
Start: 2022-04-09 | End: 2022-04-09

## 2022-04-08 RX ORDER — MUPIROCIN 20 MG/G
1 OINTMENT TOPICAL
Refills: 0 | Status: DISCONTINUED | OUTPATIENT
Start: 2022-04-08 | End: 2022-04-11

## 2022-04-08 RX ORDER — PANTOPRAZOLE SODIUM 20 MG/1
40 TABLET, DELAYED RELEASE ORAL DAILY
Refills: 0 | Status: DISCONTINUED | OUTPATIENT
Start: 2022-04-08 | End: 2022-04-11

## 2022-04-08 RX ORDER — SODIUM CHLORIDE 9 MG/ML
1000 INJECTION, SOLUTION INTRAVENOUS
Refills: 0 | Status: DISCONTINUED | OUTPATIENT
Start: 2022-04-08 | End: 2022-04-10

## 2022-04-08 RX ORDER — SODIUM CHLORIDE 9 MG/ML
500 INJECTION, SOLUTION INTRAVENOUS
Refills: 0 | Status: DISCONTINUED | OUTPATIENT
Start: 2022-04-08 | End: 2022-04-08

## 2022-04-08 RX ORDER — COLLAGENASE CLOSTRIDIUM HIST. 250 UNIT/G
1 OINTMENT (GRAM) TOPICAL
Refills: 0 | Status: DISCONTINUED | OUTPATIENT
Start: 2022-04-08 | End: 2022-04-11

## 2022-04-08 RX ORDER — CEFAZOLIN SODIUM 1 G
2000 VIAL (EA) INJECTION EVERY 8 HOURS
Refills: 0 | Status: DISCONTINUED | OUTPATIENT
Start: 2022-04-08 | End: 2022-04-11

## 2022-04-08 RX ORDER — LANOLIN ALCOHOL/MO/W.PET/CERES
5 CREAM (GRAM) TOPICAL AT BEDTIME
Refills: 0 | Status: DISCONTINUED | OUTPATIENT
Start: 2022-04-08 | End: 2022-04-11

## 2022-04-08 RX ORDER — GABAPENTIN 400 MG/1
300 CAPSULE ORAL EVERY 12 HOURS
Refills: 0 | Status: DISCONTINUED | OUTPATIENT
Start: 2022-04-08 | End: 2022-04-11

## 2022-04-08 RX ORDER — MIDODRINE HYDROCHLORIDE 2.5 MG/1
10 TABLET ORAL EVERY 8 HOURS
Refills: 0 | Status: DISCONTINUED | OUTPATIENT
Start: 2022-04-08 | End: 2022-04-11

## 2022-04-08 RX ADMIN — Medication 100 MILLIGRAM(S): at 05:37

## 2022-04-08 RX ADMIN — SODIUM CHLORIDE 75 MILLILITER(S): 9 INJECTION, SOLUTION INTRAVENOUS at 00:38

## 2022-04-08 RX ADMIN — ENOXAPARIN SODIUM 40 MILLIGRAM(S): 100 INJECTION SUBCUTANEOUS at 13:54

## 2022-04-08 RX ADMIN — Medication 1 TABLET(S): at 13:54

## 2022-04-08 RX ADMIN — GABAPENTIN 300 MILLIGRAM(S): 400 CAPSULE ORAL at 17:34

## 2022-04-08 RX ADMIN — MIDODRINE HYDROCHLORIDE 10 MILLIGRAM(S): 2.5 TABLET ORAL at 21:45

## 2022-04-08 RX ADMIN — Medication 650 MILLIGRAM(S): at 15:13

## 2022-04-08 RX ADMIN — MUPIROCIN 1 APPLICATION(S): 20 OINTMENT TOPICAL at 05:37

## 2022-04-08 RX ADMIN — Medication 100 MILLIGRAM(S): at 13:56

## 2022-04-08 RX ADMIN — Medication 50 MILLIGRAM(S): at 05:36

## 2022-04-08 RX ADMIN — RALOXIFENE HYDROCHLORIDE 60 MILLIGRAM(S): 60 TABLET, COATED ORAL at 13:54

## 2022-04-08 RX ADMIN — PANTOPRAZOLE SODIUM 40 MILLIGRAM(S): 20 TABLET, DELAYED RELEASE ORAL at 13:54

## 2022-04-08 RX ADMIN — Medication 100 MILLIGRAM(S): at 21:45

## 2022-04-08 RX ADMIN — MIDODRINE HYDROCHLORIDE 10 MILLIGRAM(S): 2.5 TABLET ORAL at 05:38

## 2022-04-08 RX ADMIN — SODIUM CHLORIDE 75 MILLILITER(S): 9 INJECTION, SOLUTION INTRAVENOUS at 15:04

## 2022-04-08 RX ADMIN — GABAPENTIN 300 MILLIGRAM(S): 400 CAPSULE ORAL at 05:37

## 2022-04-08 RX ADMIN — MIDODRINE HYDROCHLORIDE 10 MILLIGRAM(S): 2.5 TABLET ORAL at 13:54

## 2022-04-08 RX ADMIN — Medication 5 MILLIGRAM(S): at 21:45

## 2022-04-08 RX ADMIN — MUPIROCIN 1 APPLICATION(S): 20 OINTMENT TOPICAL at 17:34

## 2022-04-08 NOTE — BRIEF OPERATIVE NOTE - COMMENTS
Likely need for hallux amputation; not enough skin for closure. Will attempt to contact [facility] [employer]
Fully closed R foot wound; will eval Sat 4/9;

## 2022-04-08 NOTE — CHART NOTE - NSCHARTNOTESELECT_GEN_ALL_CORE
Event Note
Nutrition/Event Note
Event Note
Follow-up/Event Note
Transfer Note
anesthesia pacu admission note

## 2022-04-08 NOTE — CHART NOTE - NSCHARTNOTEFT_GEN_A_CORE
PACU ANESTHESIA ADMISSION NOTE      Procedure: Excisional debridement of ulcer of right foot  R foot septic arthritis and osteomyelitis      Post op diagnosis:  Foot osteomyelitis        ____  Intubated  TV:______       Rate: ______      FiO2: ______    __x__  Patent Airway    __x__  Full return of protective reflexes    __x__  Full recovery from anesthesia / back to baseline     Vitals:   T:    98.0       R:  18                BP:   129/64               Sat:   95                P: 90      Mental Status:  __x__ Awake   __x___ Alert   _____ Drowsy   _____ Sedated    Nausea/Vomiting:  __x__ NO  ______Yes,   See Post - Op Orders          Pain Scale (0-10):  __0___    Treatment: ____ None    ___x_ See Post - Op/PCA Orders    Post - Operative Fluids:   ____ Oral   __x__ See Post - Op Orders    Plan: Discharge:   ____Home       __x___Floor     _____Critical Care    _____  Other:_________________    Comments:  pt tolerated procedure well, pt transferred to PACU and report was given to PACU RN, vital signs are stable and pt shows no signs of distress. no anesthesia complications, transfer to floor when criteria met.

## 2022-04-08 NOTE — BRIEF OPERATIVE NOTE - NSICDXBRIEFPREOP_GEN_ALL_CORE_FT
PRE-OP DIAGNOSIS:  Foot osteomyelitis 01-Apr-2022 17:56:35 R foot septic arthritis and osteomyelitis Tamara Lorenzana  
PRE-OP DIAGNOSIS:  Foot osteomyelitis 01-Apr-2022 17:56:35 Right Tamara Lorenzana

## 2022-04-08 NOTE — PROGRESS NOTE ADULT - ASSESSMENT
56F w/ h/o down syndrome, non-verbal, hypothyroidism, osteoporosis presents after multiple episodes of vomiting admitted for septic shock.    #Right Forefoot Plantar Ulcer, 1st MTP Joint  #Septic Arthritis vs Osteomyelitis  On 3/28, pt noted to have right forefoot ulcer. XR R Foot demonstrated findings consistent with septic arthritis/osteomyelitis at the first MTP joint. Wound Cx 3/28 demonstrated ZACHARY (S lugdunensis). Arterial duplex demonstrated normal arterial flow in b/l LE. ESR 40 and CRP 16. S/P excisional debridement on 4/1 w/ Podiatry Team.  - c/w cefazolin 2g IV Q8H  (3/31-present)  - Transition to Augmentin 875mg PO BID for 4 weeks on discharge  - OR today (4/8) for repeat debridement and possible 1st hallux amputation    #Septic shock s/p pressors POA (resolved)  #Acute hypoxic respiratory failure 2/2 Aspiration pneumonia/pneumonitis  #Acute cystitis  - MRSA positive  - CXR: Persistent left-sided opacities.  - F/u BCx and Urinary Cx. urine negative, no growth to date in blood  - F/u sputum culture. Strep/Legionella Ag both negative  - cont midodrine 10mg q8  - s/p 7 days Vancomycin and Cefepime   - c/w hydrocortisone 50mg IV Q24H (Q8H 3/26-4/3; Q12H 4/4-4/6; Q24H 4/7 - ); started in setting of refractory septic shock, now being titrated down    #Osteoporosis  - c/w raloxifene 60mg PO QD    #Neuropathy  - c/w gabapentin 400mg PO BID    #Down Syndrome  #Functional quadriplegia  Non-verbal at baseline. Wheelchair/bed bound. Followed by [employer].    DVT PPX: Lovenox 40mg SQ QD  GI PPX: pantoprazole 40mg PO QD  DIET: Pureed w/ Mildly Thick Liquids; NPO since MN  ACTIVITY: WBAT  CODE STATUS: Full Code  [facility])    PENDING: OR w/ Podiatry today (4/8)

## 2022-04-08 NOTE — BRIEF OPERATIVE NOTE - SPECIMENS
Surgical bone culture of 1st metatarsal R foot, Sx deep wound culture, Sx soft tissue and bone pathology
Bone Path #1 (proximal 1st Metatarsal); Bone Path #2 (Anomalous yellow bony specimen)

## 2022-04-08 NOTE — BRIEF OPERATIVE NOTE - NSICDXBRIEFPROCEDURE_GEN_ALL_CORE_FT
PROCEDURES:  Excisional debridement of ulcer of right foot 01-Apr-2022 17:56:04  Tamara Lorenzana  
PROCEDURES:  Excisional debridement of ulcer of right foot 01-Apr-2022 17:56:04 R foot septic arthritis and osteomyelitis Tamara Lorenzana

## 2022-04-08 NOTE — PROGRESS NOTE ADULT - SUBJECTIVE AND OBJECTIVE BOX
************************************************  Rodolfo Hutson MD (PGY-1)  Spectra: [phone]  ************************************************    SUBJECTIVE / OVERNIGHT EVENTS  Unable to interview due to patient's current mental status. No acute overnight events reported by clinical staff this AM. No reported fevers, chills, respiratory distress, vomiting, or diarrhea.     MEDICATIONS  ceFAZolin   IVPB 2000 milliGRAM(s) IV Intermittent every 8 hours  collagenase Ointment 1 Application(s) Topical two times a day  enoxaparin Injectable 40 milliGRAM(s) SubCutaneous every 24 hours  gabapentin Solution 300 milliGRAM(s) Oral every 12 hours  hydrocortisone sodium succinate Injectable 50 milliGRAM(s) IV Push every 24 hours  melatonin 5 milliGRAM(s) Oral at bedtime  midodrine. 10 milliGRAM(s) Oral every 8 hours  multivitamin 1 Tablet(s) Oral daily  mupirocin 2% Ointment 1 Application(s) Topical two times a day  pantoprazole   Suspension 40 milliGRAM(s) Oral daily  raloxifene 60 milliGRAM(s) Oral daily    VITALS /  EXAM    T(C): 36.8 (04-08-22 @ 10:06), Max: 36.8 (04-08-22 @ 08:40)  HR: 58 (04-08-22 @ 10:06) (58 - 62)  BP: 111/65 (04-08-22 @ 10:06) (86/48 - 114/59)  RR: 18 (04-08-22 @ 05:31) (18 - 18)  SpO2: 94% (04-08-22 @ 10:06) (88% - 95%)    GENERAL: NAD, non-verbal  CHEST/LUNG: Clear to auscultation bilaterally; No wheezes, rales or rhonchi  HEART: Regular rate and rhythm; No murmurs, rubs, or gallops  ABDOMEN: Soft, Nontender, Nondistended; Bowel sounds present, no masses.  EXTREMITIES:  2+ Peripheral Pulses    I's & O's     04-07-22 @ 07:01  -  04-08-22 @ 07:00  --------------------------------------------------------  IN:    Lactated Ringers: 450 mL  Total IN: 450 mL    OUT:  Total OUT: 0 mL    Total NET: 450 mL    LABS             14.9   7.31  )-----------( 351      ( 04-07-22 @ 08:00 )             44.2     139  |  101  |  15  -------------------------<  113   04-07-22 @ 08:00  4.3  |  29  |  0.7    Ca      8.9     04-07-22 @ 08:00  Mg     2.3     04-07-22 @ 08:00    TPro  6.3  /  Alb  3.6  /  TBili  <0.2  /  DBili  x   /  AST  17  /  ALT  10  /  AlkPhos  110  /  GGT  x     04-07-22 @ 08:00    PT/INR - ( 04-07-22 @ 08:00 )   PT: 11.40 sec;   INR: 0.99 ratio  PTT - ( 04-07-22 @ 08:00 )  PTT:28.5 sec

## 2022-04-08 NOTE — PROGRESS NOTE ADULT - SUBJECTIVE AND OBJECTIVE BOX
Patient is a 56y old  Female who presents with a chief complaint of Aspiration pneumonia (07 Apr 2022 10:05)    INTERVAL HPI/OVERNIGHT EVENTS: Patient was examined and seen at bedside. This morning pt is resting comfortably in bed and staff report no new issues or overnight events.   ROS: unable to access due to baseline mental status    InitialHPI:  57y/o female with a pmhx of down syndrome, non-verbal, hypothyroidism, osteoporosis coming from [facility] here for eval multiple episodes of vomiting that began today. Pt was also found to be hypoxic and febrile. History was obtained from aid at bedside. This morning patient "vomited a lot", NBNB, then was found to be hypotensive and hypoxic to 86% on RA. Patient had been acting her usual self until then with no indication of distress. At baseline she sits in wheelchair, is nonverbal, and does not communicate in any way.    ED course: T 101, , BP min 71/36, 94% on 4L. UA positive. Initial lactate 2.2. CT showed bilateral interstitial pulmonary thickening, which may reflect aspiration pneumonitis, and bladder wall thickening.  She received azithromycin, ceftriaxone and 2.5L LR. Lactate improved to 1.8 but BP did not, so she was started on levophed.      Family: (foster mother): Ana Katz, [phone]  Consumer advisory board: Amy Rich, [phone]     (24 Mar 2022 20:31)    PAST MEDICAL & SURGICAL HISTORY:  Down syndrome    Osteoporosis    Mild anemia    Neuropathy    S/P debridement  of R hip on 3/2/21        General: NAD, alert not following commands  HEENT:  EOMI, no LAD  CV: S1 S2  Resp: decreased breath sounds at bases  GI: NT/ND/S +BS  MS: no clubbing/cyanosis/edema, + pulses b/l;  RLE c/d/i dsg  Neuro: unable to access            MEDICATIONS  (STANDING):  ceFAZolin   IVPB 2000 milliGRAM(s) IV Intermittent every 8 hours  collagenase Ointment 1 Application(s) Topical two times a day  enoxaparin Injectable 40 milliGRAM(s) SubCutaneous every 24 hours  gabapentin Solution 300 milliGRAM(s) Oral every 12 hours  lactated ringers. 1000 milliLiter(s) (75 mL/Hr) IV Continuous <Continuous>  melatonin 5 milliGRAM(s) Oral at bedtime  midodrine. 10 milliGRAM(s) Oral every 8 hours  multivitamin 1 Tablet(s) Oral daily  mupirocin 2% Ointment 1 Application(s) Topical two times a day  pantoprazole   Suspension 40 milliGRAM(s) Oral daily  raloxifene 60 milliGRAM(s) Oral daily    MEDICATIONS  (PRN):  acetaminophen     Tablet .. 650 milliGRAM(s) Oral every 6 hours PRN Temp greater or equal to 38C (100.4F), Moderate Pain (4 - 6), Severe Pain (7 - 10)    Home Medications:  ascorbic acid 500 mg oral tablet: 1 tab(s) orally once a day (29 Mar 2021 09:58)  collagenase 250 units/g topical ointment: 1 application topically 2 times a day (29 Mar 2021 09:58)  Ferrex-150 oral capsule: 1 cap(s) orally once a day (24 Mar 2022 20:45)  gabapentin 300 mg oral capsule: 1 cap(s) orally 2 times a day (24 Mar 2022 20:43)  melatonin 5 mg oral tablet: 1 tab(s) orally once a day (at bedtime) (19 Mar 2021 10:22)  midodrine 10 mg oral tablet: 1 tab(s) orally 3 times a day (29 Mar 2021 09:58)  Multiple Vitamins oral tablet: 1 tab(s) orally once a day (29 Mar 2021 09:58)  raloxifene 60 mg oral tablet: 1 tab(s) orally once a day (19 Mar 2021 10:22)  sodium hypochlorite 0.25% topical solution: 1 application topically 2 times a day (29 Mar 2021 09:58)  tiZANidine 2 mg oral tablet: 1 tab(s) orally 2 times a day (24 Mar 2022 20:42)  Vitamin D3 2000 intl units (50 mcg) oral capsule: 1 tab(s) orally once a day with meal (19 Mar 2021 10:22)    Vital Signs Last 24 Hrs  T(C): 35.9 (08 Apr 2022 14:39), Max: 36.8 (08 Apr 2022 08:40)  T(F): 96.7 (08 Apr 2022 14:39), Max: 98.3 (08 Apr 2022 08:40)  HR: 67 (08 Apr 2022 14:39) (58 - 100)  BP: 109/66 (08 Apr 2022 14:39) (92/51 - 120/74)  BP(mean): 81 (08 Apr 2022 14:39) (66 - 81)  RR: 18 (08 Apr 2022 14:39) (18 - 20)  SpO2: 98% (08 Apr 2022 14:39) (90% - 98%)  CAPILLARY BLOOD GLUCOSE        LABS:                        14.9   7.31  )-----------( 351      ( 07 Apr 2022 08:00 )             44.2     04-07    139  |  101  |  15  ----------------------------<  113<H>  4.3   |  29  |  0.7    Ca    8.9      07 Apr 2022 08:00  Mg     2.3     04-07    TPro  6.3  /  Alb  3.6  /  TBili  <0.2  /  DBili  x   /  AST  17  /  ALT  10  /  AlkPhos  110  04-07    LIVER FUNCTIONS - ( 07 Apr 2022 08:00 )  Alb: 3.6 g/dL / Pro: 6.3 g/dL / ALK PHOS: 110 U/L / ALT: 10 U/L / AST: 17 U/L / GGT: x               PT/INR - ( 07 Apr 2022 08:00 )   PT: 11.40 sec;   INR: 0.99 ratio         PTT - ( 07 Apr 2022 08:00 )  PTT:28.5 sec            Consultant Notes Reviewed:  [x ] YES  [ ] NO  Care Discussed with Consultants/Other Providers/ Housestaff [ x] YES  [ ] NO  Radiology, labs, new studies personally reviewed.

## 2022-04-08 NOTE — BRIEF OPERATIVE NOTE - NSICDXBRIEFPOSTOP_GEN_ALL_CORE_FT
POST-OP DIAGNOSIS:  Foot osteomyelitis 01-Apr-2022 17:56:55 R foot septic arthritis and osteomyelitis Tamara Lorenzana  
POST-OP DIAGNOSIS:  Foot osteomyelitis 01-Apr-2022 17:56:55 Right Tamara Lorenzana

## 2022-04-08 NOTE — PROGRESS NOTE ADULT - ASSESSMENT
56F w/ h/o down syndrome, non-verbal, hypothyroidism, osteoporosis presents after multiple episodes of vomiting admitted for septic shock.    #Right Forefoot Plantar Ulcer, 1st MTP Joint  #Septic Arthritis vs Osteomyelitis  On 3/28, pt noted to have right forefoot ulcer. XR R Foot demonstrated findings consistent with septic arthritis/osteomyelitis at the first MTP joint. Wound Cx 3/28 demonstrated ZACHARY (S lugdunensis). Arterial duplex demonstrated normal arterial flow in b/l LE. ESR 40 and CRP 16. S/P excisional debridement on 4/1 w/ Podiatry Team.  - c/w cefazolin 2g IV Q8H  (3/31-present)  - Transition to Augmentin 875mg PO BID for 4 weeks on discharge  - s/p repeat debridement on 4/8. F/u podiatry, Cx    #Septic shock s/p pressors POA (resolved)  #Acute hypoxic respiratory failure 2/2 Aspiration pneumonia/pneumonitis  #Acute cystitis  - MRSA positive  - CXR: Persistent left-sided opacities.  - F/u BCx and Urinary Cx. urine negative, no growth to date in blood  - F/u sputum culture. Strep/Legionella Ag both negative  - cont midodrine 10mg q8  - s/p 7 days Vancomycin and Cefepime   - decrease hydrocortisone 50mg IV from Q12H to Q24H (Q8H 3/26-4/3; Q12H 4/4 - 4/6; Q24H); started in setting of refractory septic shock, now being titrated down    #Osteoporosis  - c/w raloxifene 60mg PO QD    #Neuropathy  - c/w gabapentin 400mg PO BID    #Down Syndrome  #Functional quadriplegia  Non-verbal at baseline. Wheelchair/bed bound. Followed by [employer].    DVT PPX: Lovenox 40mg SQ QD  GI PPX: pantoprazole 40mg PO QD  DIET: Pureed w/ Mildly Thick Liquids; NPO after MN  ACTIVITY: WBAT  CODE STATUS: Full Code  DISPOSITION: From care home ([facility])    #Progress Note Handoff  Pending: Consults____Clinical improvement and stability__x___Tests__Cx  Pt/Family discussion: Staff formed and agree with the current plan  Disposition: Group home    My note supersedes the residents note should a discrepancy arise.    Chart and notes personally reviewed.  Care Discussed with Consultants/Other Providers/ Housestaff [ x] YES [ ] NO   Radiology, labs, old records personally reviewed.    discussed w/ housestaff, nursing, case management, group home aide

## 2022-04-08 NOTE — PRE-OP CHECKLIST - SELECT TESTS ORDERED
BMP/CBC/CMP/Urinalysis/EKG/CXR/COVID-19
Results in MD note
CATY
negative 04/07/2022/HCG/Results in MD note/COVID-19

## 2022-04-08 NOTE — PRE-ANESTHESIA EVALUATION ADULT - NSANTHOSAYNRD_GEN_A_CORE
No. BETINA screening performed.  STOP BANG Legend: 0-2 = LOW Risk; 3-4 = INTERMEDIATE Risk; 5-8 = HIGH Risk
No. BETINA screening performed.  STOP BANG Legend: 0-2 = LOW Risk; 3-4 = INTERMEDIATE Risk; 5-8 = HIGH Risk

## 2022-04-09 LAB
GRAM STN FLD: SIGNIFICANT CHANGE UP
SPECIMEN SOURCE: SIGNIFICANT CHANGE UP
SURGICAL PATHOLOGY STUDY: SIGNIFICANT CHANGE UP

## 2022-04-09 PROCEDURE — 99233 SBSQ HOSP IP/OBS HIGH 50: CPT

## 2022-04-09 RX ADMIN — GABAPENTIN 300 MILLIGRAM(S): 400 CAPSULE ORAL at 05:37

## 2022-04-09 RX ADMIN — ENOXAPARIN SODIUM 40 MILLIGRAM(S): 100 INJECTION SUBCUTANEOUS at 13:14

## 2022-04-09 RX ADMIN — Medication 100 MILLIGRAM(S): at 21:51

## 2022-04-09 RX ADMIN — RALOXIFENE HYDROCHLORIDE 60 MILLIGRAM(S): 60 TABLET, COATED ORAL at 13:13

## 2022-04-09 RX ADMIN — Medication 650 MILLIGRAM(S): at 22:05

## 2022-04-09 RX ADMIN — MUPIROCIN 1 APPLICATION(S): 20 OINTMENT TOPICAL at 18:13

## 2022-04-09 RX ADMIN — MIDODRINE HYDROCHLORIDE 10 MILLIGRAM(S): 2.5 TABLET ORAL at 21:52

## 2022-04-09 RX ADMIN — PANTOPRAZOLE SODIUM 40 MILLIGRAM(S): 20 TABLET, DELAYED RELEASE ORAL at 13:12

## 2022-04-09 RX ADMIN — MUPIROCIN 1 APPLICATION(S): 20 OINTMENT TOPICAL at 05:38

## 2022-04-09 RX ADMIN — Medication 1 APPLICATION(S): at 18:09

## 2022-04-09 RX ADMIN — MIDODRINE HYDROCHLORIDE 10 MILLIGRAM(S): 2.5 TABLET ORAL at 13:15

## 2022-04-09 RX ADMIN — Medication 50 MILLIGRAM(S): at 13:13

## 2022-04-09 RX ADMIN — Medication 1 TABLET(S): at 13:13

## 2022-04-09 RX ADMIN — Medication 5 MILLIGRAM(S): at 21:52

## 2022-04-09 RX ADMIN — GABAPENTIN 300 MILLIGRAM(S): 400 CAPSULE ORAL at 18:11

## 2022-04-09 RX ADMIN — MIDODRINE HYDROCHLORIDE 10 MILLIGRAM(S): 2.5 TABLET ORAL at 05:37

## 2022-04-09 RX ADMIN — Medication 650 MILLIGRAM(S): at 21:51

## 2022-04-09 RX ADMIN — Medication 100 MILLIGRAM(S): at 13:28

## 2022-04-09 RX ADMIN — Medication 100 MILLIGRAM(S): at 05:38

## 2022-04-09 NOTE — PROGRESS NOTE ADULT - ASSESSMENT
56F w/ h/o down syndrome, non-verbal, hypothyroidism, osteoporosis presents after multiple episodes of vomiting admitted for septic shock.    #Right Forefoot Plantar Ulcer, 1st MTP Joint  #Septic Arthritis vs Osteomyelitis  On 3/28, pt noted to have right forefoot ulcer. XR R Foot demonstrated findings consistent with septic arthritis/osteomyelitis at the first MTP joint. Wound Cx 3/28 demonstrated ZACHARY (S lugdunensis). Arterial duplex demonstrated normal arterial flow in b/l LE. ESR 40 and CRP 16. S/P excisional debridement on 4/1 w/ Podiatry Team. S/P 1st hallux and partial 1st MT amputation on 4/8.  - c/w cefazolin 2g IV Q8H  (3/31-present)  - Transition to Augmentin 875mg PO BID for 4 weeks on discharge  - f/u Podiatry for new wound care recs    #Septic shock s/p pressors POA (resolved)  #Acute hypoxic respiratory failure 2/2 Aspiration pneumonia/pneumonitis  #Acute cystitis  - MRSA positive  - CXR: Persistent left-sided opacities.  - F/u BCx and Urinary Cx. urine negative, no growth to date in blood  - F/u sputum culture. Strep/Legionella Ag both negative  - cont midodrine 10mg q8  - s/p 7 days Vancomycin and Cefepime   - d/c hydrocortisone 50mg IV Q24H after today's dose (Q8H 3/26-4/3; Q12H 4/4-4/6; Q24H 4/7-4/9); started in setting of refractory septic shock, now successfully titrated off    #Osteoporosis  - c/w raloxifene 60mg PO QD    #Neuropathy  - c/w gabapentin 400mg PO BID    #Down Syndrome  #Functional quadriplegia  Non-verbal at baseline. Wheelchair/bed bound. Followed by [employer].    DVT PPX: Lovenox 40mg SQ QD  GI PPX: pantoprazole 40mg PO QD  DIET: Pureed w/ Mildly Thick Liquids; NPO since MN  ACTIVITY: WBAT  CODE STATUS: Full Code  DISPOSITION: From residential ([facility])    PENDING: f/u podiatry; anticipate for Monday   56F w/ h/o down syndrome, non-verbal, hypothyroidism, osteoporosis presents after multiple episodes of vomiting admitted for septic shock.    #Right Forefoot Plantar Ulcer, 1st MTP Joint  #Septic Arthritis vs Osteomyelitis  On 3/28, pt noted to have right forefoot ulcer. XR R Foot demonstrated findings consistent with septic arthritis/osteomyelitis at the first MTP joint. Wound Cx 3/28 demonstrated ZACHARY (S lugdunensis). Arterial duplex demonstrated normal arterial flow in b/l LE. ESR 40 and CRP 16. S/P excisional debridement on 4/1 w/ Podiatry Team. S/P 1st hallux and partial 1st MT amputation on 4/8.  - c/w cefazolin 2g IV Q8H  (3/31-present)  - Transition to Augmentin 875mg PO BID for 4 weeks on discharge  - f/u ID to see if further abx necessary given amputation  - f/u Podiatry for new wound care recs    #Septic shock s/p pressors POA (resolved)  #Acute hypoxic respiratory failure 2/2 Aspiration pneumonia/pneumonitis  #Acute cystitis  - MRSA positive  - CXR: Persistent left-sided opacities.  - F/u BCx and Urinary Cx. urine negative, no growth to date in blood  - F/u sputum culture. Strep/Legionella Ag both negative  - cont midodrine 10mg q8  - s/p 7 days Vancomycin and Cefepime   - d/c hydrocortisone 50mg IV Q24H after today's dose (Q8H 3/26-4/3; Q12H 4/4-4/6; Q24H 4/7-4/9); started in setting of refractory septic shock, now successfully titrated off    #Osteoporosis  - c/w raloxifene 60mg PO QD    #Neuropathy  - c/w gabapentin 400mg PO BID    #Down Syndrome  #Functional quadriplegia  Non-verbal at baseline. Wheelchair/bed bound. Followed by [employer].    DVT PPX: Lovenox 40mg SQ QD  GI PPX: pantoprazole 40mg PO QD  DIET: Pureed w/ Mildly Thick Liquids; NPO since MN  ACTIVITY: WBAT  CODE STATUS: Full Code  DISPOSITION: From longterm ([facility])    PENDING: f/u podiatry; f/u ID; anticipate for d/c on Monday (discussed hospital course w/ JENIFER Cervantes from group home; pt okay to return to group home)   56F w/ h/o down syndrome, non-verbal, hypothyroidism, osteoporosis presents after multiple episodes of vomiting admitted for septic shock.    #Right Forefoot Plantar Ulcer, 1st MTP Joint  #Septic Arthritis vs Osteomyelitis  On 3/28, pt noted to have right forefoot ulcer. XR R Foot demonstrated findings consistent with septic arthritis/osteomyelitis at the first MTP joint. Wound Cx 3/28 demonstrated ZACHARY (S lugdunensis). Arterial duplex demonstrated normal arterial flow in b/l LE. ESR 40 and CRP 16. S/P excisional debridement on 4/1 w/ Podiatry Team. S/P 1st hallux and partial 1st MT amputation on 4/8.  - c/w cefazolin 2g IV Q8H  (3/31-present)  - Transition to Augmentin 875mg PO BID for 4 weeks on discharge  - f/u ID to see if further abx necessary given amputation  - f/u Podiatry for new wound care recs    #Hypotension  Managed at home w/ midodrine 10mg TID  - c/w midodrine 10mg PO TID    #Septic Shock, resolved  #Aspiration Pneumonia, resolved  #Acute Hypoxic Respiratory Failure, resolved  Initially p/w hypoxia s/p vomitting. Sepsis present on admission (T 101F, , Lactate 2.2). Admission CXR demosntrated b/l interstitial prominence. MRSA PCR positive. BCx, UCx, Sputum Cx show NGTD. Strep/Legionella Ur Ag neg. S/P vancomycin and cefepime x7 days. Required pressors on admission, but titrated off since 3/28. Started on stress dose steroids in setting of refractory shock, but has since been titrated off.    #Osteoporosis  - c/w raloxifene 60mg PO QD    #Neuropathy  - c/w gabapentin 400mg PO BID    #Down Syndrome  #Functional quadriplegia  Non-verbal at baseline. Wheelchair/bed bound. Followed by [employer].    DVT PPX: Lovenox 40mg SQ QD  GI PPX: pantoprazole 40mg PO QD  DIET: Pureed w/ Mildly Thick Liquids; NPO since MN  ACTIVITY: WBAT  CODE STATUS: Full Code  DISPOSITION: From long-term ([facility])    PENDING: f/u podiatry; f/u ID; anticipate for d/c on Monday (discussed hospital course w/ JENIFER Cervantes from group home; pt okay to return to group home)

## 2022-04-09 NOTE — PROGRESS NOTE ADULT - ASSESSMENT
56F w/ h/o down syndrome, non-verbal, hypothyroidism, osteoporosis presents after multiple episodes of vomiting admitted for septic shock.    #Right Forefoot Plantar Ulcer, 1st MTP Joint  #Septic Arthritis vs Osteomyelitis  On 3/28, pt noted to have right forefoot ulcer. XR R Foot demonstrated findings consistent with septic arthritis/osteomyelitis at the first MTP joint. Wound Cx 3/28 demonstrated ZACHARY (S lugdunensis). Arterial duplex demonstrated normal arterial flow in b/l LE. ESR 40 and CRP 16. S/P excisional debridement on 4/1 w/ Podiatry Team.  - c/w cefazolin 2g IV Q8H  (3/31-present)  - Transition to Augmentin 875mg PO BID for 4 weeks on discharge  - s/p repeat debridement on 4/8. F/u podiatry, Cx  - PO ABx on d/c as per ID    #Septic shock s/p pressors POA (resolved)  #Acute hypoxic respiratory failure 2/2 Aspiration pneumonia/pneumonitis  #Acute cystitis  - MRSA positive  - CXR: Persistent left-sided opacities.  - F/u BCx and Urinary Cx. urine negative, no growth to date in blood  - F/u sputum culture. Strep/Legionella Ag both negative  - cont midodrine 10mg q8  - s/p 7 days Vancomycin and Cefepime   - decrease hydrocortisone 50mg IV from Q12H to Q24H (Q8H 3/26-4/3; Q12H 4/4 - 4/6; Q24H); started in setting of refractory septic shock, now being titrated down    #Osteoporosis  - c/w raloxifene 60mg PO QD    #Neuropathy  - c/w gabapentin 400mg PO BID    #Down Syndrome  #Functional quadriplegia  Non-verbal at baseline. Wheelchair/bed bound. Followed by [employer].    DVT PPX: Lovenox 40mg SQ QD  GI PPX: pantoprazole 40mg PO QD  DIET: Pureed w/ Mildly Thick Liquids; NPO after MN  ACTIVITY: WBAT  CODE STATUS: Full Code  DISPOSITION: From [facility] ([facility])    #Progress Note Handoff  Pending: Consults____Clinical improvement and stability__x___Tests__Cx; Podiatry and ID clearance  Pt/Family discussion: Staff formed and agree with the current plan  Disposition: Group home    My note supersedes the residents note should a discrepancy arise.    Chart and notes personally reviewed.  Care Discussed with Consultants/Other Providers/ Housestaff [ x] YES [ ] NO   Radiology, labs, old records personally reviewed.    discussed w/ housestaff, nursing, case management, group home aide

## 2022-04-09 NOTE — PROGRESS NOTE ADULT - SUBJECTIVE AND OBJECTIVE BOX
Patient is a 56y old  Female who presents with a chief complaint of Aspiration pneumonia (07 Apr 2022 10:05)    INTERVAL HPI/OVERNIGHT EVENTS: Patient was examined and seen at bedside. This morning pt is resting comfortably in bed and staff report no new issues or overnight events.   ROS: unable to access due to baseline mental status    InitialHPI:  57y/o female with a pmhx of down syndrome, non-verbal, hypothyroidism, osteoporosis coming from [facility] here for eval multiple episodes of vomiting that began today. Pt was also found to be hypoxic and febrile. History was obtained from aid at bedside. This morning patient "vomited a lot", NBNB, then was found to be hypotensive and hypoxic to 86% on RA. Patient had been acting her usual self until then with no indication of distress. At baseline she sits in wheelchair, is nonverbal, and does not communicate in any way.    ED course: T 101, , BP min 71/36, 94% on 4L. UA positive. Initial lactate 2.2. CT showed bilateral interstitial pulmonary thickening, which may reflect aspiration pneumonitis, and bladder wall thickening.  She received azithromycin, ceftriaxone and 2.5L LR. Lactate improved to 1.8 but BP did not, so she was started on levophed.      Family: (foster mother): Ana Katz, [phone]  Consumer advisory board: Amy Rich, [phone]     (24 Mar 2022 20:31)    PAST MEDICAL & SURGICAL HISTORY:  Down syndrome    Osteoporosis    Mild anemia    Neuropathy    S/P debridement  of R hip on 3/2/21        General: NAD, alert not following commands  HEENT:  EOMI, no LAD  CV: S1 S2  Resp: decreased breath sounds at bases  GI: NT/ND/S +BS  MS: no clubbing/cyanosis/edema, + pulses b/l;  RLE c/d/i dsg  Neuro: unable to access            MEDICATIONS  (STANDING):  ceFAZolin   IVPB 2000 milliGRAM(s) IV Intermittent every 8 hours  collagenase Ointment 1 Application(s) Topical two times a day  enoxaparin Injectable 40 milliGRAM(s) SubCutaneous every 24 hours  gabapentin Solution 300 milliGRAM(s) Oral every 12 hours  hydrocortisone sodium succinate Injectable 50 milliGRAM(s) IV Push once  lactated ringers. 1000 milliLiter(s) (75 mL/Hr) IV Continuous <Continuous>  melatonin 5 milliGRAM(s) Oral at bedtime  midodrine. 10 milliGRAM(s) Oral every 8 hours  multivitamin 1 Tablet(s) Oral daily  mupirocin 2% Ointment 1 Application(s) Topical two times a day  pantoprazole   Suspension 40 milliGRAM(s) Oral daily  raloxifene 60 milliGRAM(s) Oral daily    MEDICATIONS  (PRN):  acetaminophen     Tablet .. 650 milliGRAM(s) Oral every 6 hours PRN Temp greater or equal to 38C (100.4F), Moderate Pain (4 - 6), Severe Pain (7 - 10)    Home Medications:  ascorbic acid 500 mg oral tablet: 1 tab(s) orally once a day (29 Mar 2021 09:58)  collagenase 250 units/g topical ointment: 1 application topically 2 times a day (29 Mar 2021 09:58)  Ferrex-150 oral capsule: 1 cap(s) orally once a day (24 Mar 2022 20:45)  gabapentin 300 mg oral capsule: 1 cap(s) orally 2 times a day (24 Mar 2022 20:43)  melatonin 5 mg oral tablet: 1 tab(s) orally once a day (at bedtime) (19 Mar 2021 10:22)  midodrine 10 mg oral tablet: 1 tab(s) orally 3 times a day (29 Mar 2021 09:58)  Multiple Vitamins oral tablet: 1 tab(s) orally once a day (29 Mar 2021 09:58)  raloxifene 60 mg oral tablet: 1 tab(s) orally once a day (19 Mar 2021 10:22)  sodium hypochlorite 0.25% topical solution: 1 application topically 2 times a day (29 Mar 2021 09:58)  tiZANidine 2 mg oral tablet: 1 tab(s) orally 2 times a day (24 Mar 2022 20:42)  Vitamin D3 2000 intl units (50 mcg) oral capsule: 1 tab(s) orally once a day with meal (19 Mar 2021 10:22)    Vital Signs Last 24 Hrs  T(C): 36.1 (09 Apr 2022 05:01), Max: 36.7 (08 Apr 2022 21:11)  T(F): 96.9 (09 Apr 2022 05:01), Max: 98.1 (08 Apr 2022 21:11)  HR: 57 (09 Apr 2022 05:01) (57 - 67)  BP: 101/54 (09 Apr 2022 05:01) (101/53 - 109/66)  BP(mean): 81 (08 Apr 2022 14:39) (81 - 81)  RR: 18 (09 Apr 2022 05:01) (17 - 94)  SpO2: 97% (08 Apr 2022 20:53) (97% - 98%)  CAPILLARY BLOOD GLUCOSE        LABS:                            Culture - Tissue with Gram Stain (collected 08 Apr 2022 11:00)  Source: .Tissue None  Gram Stain (09 Apr 2022 04:58):    No polymorphonuclear leukocytes seen per low power field    No organisms seen per oil power field      Consultant Notes Reviewed:  [x ] YES  [ ] NO  Care Discussed with Consultants/Other Providers/ Housestaff [ x] YES  [ ] NO  Radiology, labs, new studies personally reviewed.

## 2022-04-09 NOTE — PROGRESS NOTE ADULT - SUBJECTIVE AND OBJECTIVE BOX
************************************************  Rodolfo Hutsno MD (PGY-1)  Spectra: [phone]  ************************************************    SUBJECTIVE / OVERNIGHT EVENTS  Underwent right first hallux and partial first MT amputation w/ Podiatry Team. Unable to interview due to patient's current mental status. No acute overnight events reported by clinical staff this AM. No reported fevers, chills, respiratory distress, vomiting, or diarrhea.     MEDICATIONS  ceFAZolin   IVPB 2000 milliGRAM(s) IV Intermittent every 8 hours  collagenase Ointment 1 Application(s) Topical two times a day  enoxaparin Injectable 40 milliGRAM(s) SubCutaneous every 24 hours  gabapentin Solution 300 milliGRAM(s) Oral every 12 hours  hydrocortisone sodium succinate Injectable 50 milliGRAM(s) IV Push once  lactated ringers. 1000 milliLiter(s) IV Continuous <Continuous>  melatonin 5 milliGRAM(s) Oral at bedtime  midodrine. 10 milliGRAM(s) Oral every 8 hours  multivitamin 1 Tablet(s) Oral daily  mupirocin 2% Ointment 1 Application(s) Topical two times a day  pantoprazole   Suspension 40 milliGRAM(s) Oral daily  raloxifene 60 milliGRAM(s) Oral daily    acetaminophen     Tablet .. 650 milliGRAM(s) Oral every 6 hours PRN Temp greater or equal to 38C (100.4F), Moderate Pain (4 - 6), Severe Pain (7 - 10)    VITALS /  EXAM    T(C): 36.1 (04-09-22 @ 05:01), Max: 36.8 (04-08-22 @ 08:40)  HR: 57 (04-09-22 @ 05:01) (57 - 100)  BP: 101/54 (04-09-22 @ 05:01) (101/53 - 120/74)  RR: 18 (04-09-22 @ 05:01) (17 - 94)  SpO2: 97% (04-08-22 @ 20:53) (94% - 98%)    GENERAL: NAD, well-developed  CHEST/LUNG: Clear to auscultation bilaterally; No wheezes, rales or rhonchi  HEART: Regular rate and rhythm; No murmurs, rubs, or gallops  ABDOMEN: Soft, Nontender, Nondistended; Bowel sounds present, no masses.  EXTREMITIES: Right medial foot wound dressing c/d/i; 2+ Peripheral Pulses    I's & O's     04-08-22 @ 07:01  -  04-09-22 @ 07:00  --------------------------------------------------------  IN:    IV PiggyBack: 100 mL    Lactated Ringers: 900 mL  Total IN: 1000 mL    OUT:  Total OUT: 0 mL    Total NET: 1000 mL        LABS             14.9   7.31  )-----------( 351      ( 04-07-22 @ 08:00 )             44.2     139  |  101  |  15  -------------------------<  113   04-07-22 @ 08:00  4.3  |  29  |  0.7    Ca      8.9     04-07-22 @ 08:00  Mg     2.3     04-07-22 @ 08:00    TPro  6.3  /  Alb  3.6  /  TBili  <0.2  /  DBili  x   /  AST  17  /  ALT  10  /  AlkPhos  110  /  GGT  x     04-07-22 @ 08:00    PT/INR - ( 04-07-22 @ 08:00 )   PT: 11.40 sec;   INR: 0.99 ratio  PTT - ( 04-07-22 @ 08:00 )  PTT:28.5 sec    MICRO / IMAGING / CARDIOLOGY    Culture - Tissue with Gram Stain (collected 04-08-22 @ 11:00)  Source: .Tissue None  Gram Stain:    No polymorphonuclear leukocytes seen per low power field    No organisms seen per oil power field    Xray Foot AP + Lateral + Oblique, Right (04.08.22 @ 13:25)  1. Post interval resection of the first toe and further resection of the first metatarsal shaft, preserving the sesamoids  2. Postsurgical changes in the soft tissues are noted  3. Diffuse degenerative changes of the hindfoot and midfoot joints consistent with neuropathic osteoarthropathy  4. Shortening of the fourth metatarsal is stable

## 2022-04-09 NOTE — PROGRESS NOTE ADULT - SUBJECTIVE AND OBJECTIVE BOX
PROGRESS NOTE   Pt seen @ bedside during AM rounds. S/p Hallux amp w/ skinflap closure, R. foot (DOS: 4/8/2022). Dressing +Clean, +Dry, +Intact.      Vital Signs Last 24 Hrs  T(C): 36.1 (09 Apr 2022 05:01), Max: 36.7 (08 Apr 2022 21:11)  T(F): 96.9 (09 Apr 2022 05:01), Max: 98.1 (08 Apr 2022 21:11)  HR: 57 (09 Apr 2022 05:01) (57 - 67)  BP: 101/54 (09 Apr 2022 05:01) (101/53 - 109/66)  BP(mean): 81 (08 Apr 2022 14:39) (81 - 81)  RR: 18 (09 Apr 2022 05:01) (17 - 94)  SpO2: 97% (08 Apr 2022 20:53) (97% - 98%)                        PHYSICAL EXAM  Right LE Focused:  DERM: Sutures appear well intact and coapted. No signs of wound dehiscence.  Mild erythema noted along the surgical site,     Assessment:  S/p Hallux amp w/ skinflap closure, R. foot (DOS: 4/8/2022)      Plan:  Pt. seen and evaluated  Discussed treatment and condition w/ patient  Surgical site cleansed w/ normal saline  Applied Xeroform / DSD / Kerlix  Cont. w/ LWC: As stated above  Wound care orders: As stated above, Q24h  F/u OR cultures  F/u w/ ID for Abx regimen upon d/c  Pt. gtg from podiatry standpoint;can f/u as o/p w/ Dr. Everett in clinic at [address] [place] [address]. 1 week post d/c  Discussed plan w/ Attending, Dr. Everett

## 2022-04-10 LAB
-  AMIKACIN: SIGNIFICANT CHANGE UP
-  AMOXICILLIN/CLAVULANIC ACID: SIGNIFICANT CHANGE UP
-  AMPICILLIN/SULBACTAM: SIGNIFICANT CHANGE UP
-  AMPICILLIN: SIGNIFICANT CHANGE UP
-  AZTREONAM: SIGNIFICANT CHANGE UP
-  CEFAZOLIN: SIGNIFICANT CHANGE UP
-  CEFEPIME: SIGNIFICANT CHANGE UP
-  CEFOXITIN: SIGNIFICANT CHANGE UP
-  CEFTRIAXONE: SIGNIFICANT CHANGE UP
-  CIPROFLOXACIN: SIGNIFICANT CHANGE UP
-  ERTAPENEM: SIGNIFICANT CHANGE UP
-  GENTAMICIN: SIGNIFICANT CHANGE UP
-  LEVOFLOXACIN: SIGNIFICANT CHANGE UP
-  MEROPENEM: SIGNIFICANT CHANGE UP
-  PIPERACILLIN/TAZOBACTAM: SIGNIFICANT CHANGE UP
-  TOBRAMYCIN: SIGNIFICANT CHANGE UP
-  TRIMETHOPRIM/SULFAMETHOXAZOLE: SIGNIFICANT CHANGE UP
ALBUMIN SERPL ELPH-MCNC: 3 G/DL — LOW (ref 3.5–5.2)
ALP SERPL-CCNC: 97 U/L — SIGNIFICANT CHANGE UP (ref 30–115)
ALT FLD-CCNC: <5 U/L — SIGNIFICANT CHANGE UP (ref 0–41)
ANION GAP SERPL CALC-SCNC: 11 MMOL/L — SIGNIFICANT CHANGE UP (ref 7–14)
AST SERPL-CCNC: 17 U/L — SIGNIFICANT CHANGE UP (ref 0–41)
BILIRUB SERPL-MCNC: <0.2 MG/DL — SIGNIFICANT CHANGE UP (ref 0.2–1.2)
BUN SERPL-MCNC: 6 MG/DL — LOW (ref 10–20)
CALCIUM SERPL-MCNC: 8 MG/DL — LOW (ref 8.5–10.1)
CHLORIDE SERPL-SCNC: 104 MMOL/L — SIGNIFICANT CHANGE UP (ref 98–110)
CO2 SERPL-SCNC: 28 MMOL/L — SIGNIFICANT CHANGE UP (ref 17–32)
CREAT SERPL-MCNC: 0.7 MG/DL — SIGNIFICANT CHANGE UP (ref 0.7–1.5)
EGFR: 102 ML/MIN/1.73M2 — SIGNIFICANT CHANGE UP
GLUCOSE SERPL-MCNC: 127 MG/DL — HIGH (ref 70–99)
HCT VFR BLD CALC: 38 % — SIGNIFICANT CHANGE UP (ref 37–47)
HGB BLD-MCNC: 13.1 G/DL — SIGNIFICANT CHANGE UP (ref 12–16)
MCHC RBC-ENTMCNC: 32.9 PG — HIGH (ref 27–31)
MCHC RBC-ENTMCNC: 34.5 G/DL — SIGNIFICANT CHANGE UP (ref 32–37)
MCV RBC AUTO: 95.5 FL — SIGNIFICANT CHANGE UP (ref 81–99)
METHOD TYPE: SIGNIFICANT CHANGE UP
NRBC # BLD: 0 /100 WBCS — SIGNIFICANT CHANGE UP (ref 0–0)
PLATELET # BLD AUTO: 229 K/UL — SIGNIFICANT CHANGE UP (ref 130–400)
POTASSIUM SERPL-MCNC: 3.7 MMOL/L — SIGNIFICANT CHANGE UP (ref 3.5–5)
POTASSIUM SERPL-SCNC: 3.7 MMOL/L — SIGNIFICANT CHANGE UP (ref 3.5–5)
PROT SERPL-MCNC: 5.3 G/DL — LOW (ref 6–8)
RBC # BLD: 3.98 M/UL — LOW (ref 4.2–5.4)
RBC # FLD: 14.6 % — HIGH (ref 11.5–14.5)
SODIUM SERPL-SCNC: 143 MMOL/L — SIGNIFICANT CHANGE UP (ref 135–146)
WBC # BLD: 8.29 K/UL — SIGNIFICANT CHANGE UP (ref 4.8–10.8)
WBC # FLD AUTO: 8.29 K/UL — SIGNIFICANT CHANGE UP (ref 4.8–10.8)

## 2022-04-10 PROCEDURE — 99233 SBSQ HOSP IP/OBS HIGH 50: CPT

## 2022-04-10 RX ORDER — SODIUM CHLORIDE 9 MG/ML
1000 INJECTION, SOLUTION INTRAVENOUS
Refills: 0 | Status: DISCONTINUED | OUTPATIENT
Start: 2022-04-10 | End: 2022-04-11

## 2022-04-10 RX ADMIN — Medication 100 MILLIGRAM(S): at 05:33

## 2022-04-10 RX ADMIN — Medication 100 MILLIGRAM(S): at 21:37

## 2022-04-10 RX ADMIN — PANTOPRAZOLE SODIUM 40 MILLIGRAM(S): 20 TABLET, DELAYED RELEASE ORAL at 11:45

## 2022-04-10 RX ADMIN — Medication 1 TABLET(S): at 11:45

## 2022-04-10 RX ADMIN — Medication 100 MILLIGRAM(S): at 13:49

## 2022-04-10 RX ADMIN — MUPIROCIN 1 APPLICATION(S): 20 OINTMENT TOPICAL at 05:33

## 2022-04-10 RX ADMIN — MIDODRINE HYDROCHLORIDE 10 MILLIGRAM(S): 2.5 TABLET ORAL at 13:48

## 2022-04-10 RX ADMIN — GABAPENTIN 300 MILLIGRAM(S): 400 CAPSULE ORAL at 06:02

## 2022-04-10 RX ADMIN — ENOXAPARIN SODIUM 40 MILLIGRAM(S): 100 INJECTION SUBCUTANEOUS at 13:49

## 2022-04-10 RX ADMIN — Medication 5 MILLIGRAM(S): at 21:37

## 2022-04-10 RX ADMIN — RALOXIFENE HYDROCHLORIDE 60 MILLIGRAM(S): 60 TABLET, COATED ORAL at 11:45

## 2022-04-10 RX ADMIN — SODIUM CHLORIDE 50 MILLILITER(S): 9 INJECTION, SOLUTION INTRAVENOUS at 15:42

## 2022-04-10 RX ADMIN — Medication 1 APPLICATION(S): at 05:32

## 2022-04-10 RX ADMIN — MIDODRINE HYDROCHLORIDE 10 MILLIGRAM(S): 2.5 TABLET ORAL at 21:36

## 2022-04-10 RX ADMIN — MUPIROCIN 1 APPLICATION(S): 20 OINTMENT TOPICAL at 18:22

## 2022-04-10 RX ADMIN — MIDODRINE HYDROCHLORIDE 10 MILLIGRAM(S): 2.5 TABLET ORAL at 05:33

## 2022-04-10 RX ADMIN — GABAPENTIN 300 MILLIGRAM(S): 400 CAPSULE ORAL at 18:22

## 2022-04-10 NOTE — PROGRESS NOTE ADULT - ASSESSMENT
56F w/ h/o down syndrome, non-verbal, hypothyroidism, osteoporosis presents after multiple episodes of vomiting admitted for septic shock.    #Right Forefoot Plantar Ulcer, 1st MTP Joint  #Septic Arthritis vs Osteomyelitis  On 3/28, pt noted to have right forefoot ulcer. XR R Foot demonstrated findings consistent with septic arthritis/osteomyelitis at the first MTP joint. Wound Cx 3/28 demonstrated ZACHARY (S lugdunensis). Arterial duplex demonstrated normal arterial flow in b/l LE. ESR 40 and CRP 16. S/P excisional debridement on 4/1 w/ Podiatry Team.  - c/w cefazolin 2g IV Q8H  (3/31-present)  - Transition to Augmentin 875mg PO BID for 4 weeks on discharge  - s/p repeat debridement on 4/8. F/u podiatry, Cx  - PO ABx on d/c as per ID    #Septic shock s/p pressors POA (resolved)  #Acute hypoxic respiratory failure 2/2 Aspiration pneumonia/pneumonitis  #Acute cystitis  - MRSA positive  - CXR: Persistent left-sided opacities.  - F/u BCx and Urinary Cx. urine negative, no growth to date in blood  - F/u sputum culture. Strep/Legionella Ag both negative  - cont midodrine 10mg q8  - s/p 7 days Vancomycin and Cefepime   - decrease hydrocortisone 50mg IV from Q12H to Q24H (Q8H 3/26-4/3; Q12H 4/4 - 4/6; Q24H); started in setting of refractory septic shock, now being titrated down  - taper off IVFs if BP tolerates    #Osteoporosis  - c/w raloxifene 60mg PO QD    #Neuropathy  - c/w gabapentin 400mg PO BID    #Down Syndrome  #Functional quadriplegia  Non-verbal at baseline. Wheelchair/bed bound. Followed by [employer].    DVT PPX: Lovenox 40mg SQ QD  GI PPX: pantoprazole 40mg PO QD  DIET: Pureed w/ Mildly Thick Liquids; NPO after MN  ACTIVITY: WBAT  CODE STATUS: Full Code  DISPOSITION: From retirement ([facility]DD)    #Progress Note Handoff  Pending: Consults____Clinical improvement and stability__x___Tests__Cx; Podiatry and ID clearance. BP stability  Pt/Family discussion: Staff informed and agree with the current plan  Disposition: Group home    My note supersedes the residents note should a discrepancy arise.    Chart and notes personally reviewed.  Care Discussed with Consultants/Other Providers/ Housestaff [ x] YES [ ] NO   Radiology, labs, old records personally reviewed.    discussed w/ housestaff, nursing, case management, group home aide

## 2022-04-10 NOTE — PROGRESS NOTE ADULT - SUBJECTIVE AND OBJECTIVE BOX
Patient is a 56y old  Female who presents with a chief complaint of Aspiration pneumonia (07 Apr 2022 10:05)    INTERVAL HPI/OVERNIGHT EVENTS: Patient was examined and seen at bedside. This morning pt is resting comfortably in bed and staff report no new issues or overnight events.   ROS: unable to access due to baseline mental status    InitialHPI:  57y/o female with a pmhx of down syndrome, non-verbal, hypothyroidism, osteoporosis coming from [facility] here for eval multiple episodes of vomiting that began today. Pt was also found to be hypoxic and febrile. History was obtained from aid at bedside. This morning patient "vomited a lot", NBNB, then was found to be hypotensive and hypoxic to 86% on RA. Patient had been acting her usual self until then with no indication of distress. At baseline she sits in wheelchair, is nonverbal, and does not communicate in any way.    ED course: T 101, , BP min 71/36, 94% on 4L. UA positive. Initial lactate 2.2. CT showed bilateral interstitial pulmonary thickening, which may reflect aspiration pneumonitis, and bladder wall thickening.  She received azithromycin, ceftriaxone and 2.5L LR. Lactate improved to 1.8 but BP did not, so she was started on levophed.      Family: (foster mother): Ana Katz, [phone]  Consumer advisory board: Amy Rich, [phone]     (24 Mar 2022 20:31)    PAST MEDICAL & SURGICAL HISTORY:  Down syndrome    Osteoporosis    Mild anemia    Neuropathy    S/P debridement  of R hip on 3/2/21        General: NAD, alert not following commands  HEENT:  EOMI, no LAD  CV: S1 S2  Resp: decreased breath sounds at bases  GI: NT/ND/S +BS  MS: no clubbing/cyanosis/edema, + pulses b/l;  RLE c/d/i dsg  Neuro: unable to access            MEDICATIONS  (STANDING):  ceFAZolin   IVPB 2000 milliGRAM(s) IV Intermittent every 8 hours  collagenase Ointment 1 Application(s) Topical two times a day  enoxaparin Injectable 40 milliGRAM(s) SubCutaneous every 24 hours  gabapentin Solution 300 milliGRAM(s) Oral every 12 hours  lactated ringers. 1000 milliLiter(s) (50 mL/Hr) IV Continuous <Continuous>  melatonin 5 milliGRAM(s) Oral at bedtime  midodrine. 10 milliGRAM(s) Oral every 8 hours  multivitamin 1 Tablet(s) Oral daily  mupirocin 2% Ointment 1 Application(s) Topical two times a day  pantoprazole   Suspension 40 milliGRAM(s) Oral daily  raloxifene 60 milliGRAM(s) Oral daily    MEDICATIONS  (PRN):  acetaminophen     Tablet .. 650 milliGRAM(s) Oral every 6 hours PRN Temp greater or equal to 38C (100.4F), Moderate Pain (4 - 6), Severe Pain (7 - 10)    Home Medications:  ascorbic acid 500 mg oral tablet: 1 tab(s) orally once a day (29 Mar 2021 09:58)  collagenase 250 units/g topical ointment: 1 application topically 2 times a day (29 Mar 2021 09:58)  Ferrex-150 oral capsule: 1 cap(s) orally once a day (24 Mar 2022 20:45)  gabapentin 300 mg oral capsule: 1 cap(s) orally 2 times a day (24 Mar 2022 20:43)  melatonin 5 mg oral tablet: 1 tab(s) orally once a day (at bedtime) (19 Mar 2021 10:22)  midodrine 10 mg oral tablet: 1 tab(s) orally 3 times a day (29 Mar 2021 09:58)  Multiple Vitamins oral tablet: 1 tab(s) orally once a day (29 Mar 2021 09:58)  raloxifene 60 mg oral tablet: 1 tab(s) orally once a day (19 Mar 2021 10:22)  sodium hypochlorite 0.25% topical solution: 1 application topically 2 times a day (29 Mar 2021 09:58)  tiZANidine 2 mg oral tablet: 1 tab(s) orally 2 times a day (24 Mar 2022 20:42)  Vitamin D3 2000 intl units (50 mcg) oral capsule: 1 tab(s) orally once a day with meal (19 Mar 2021 10:22)    Vital Signs Last 24 Hrs  T(C): 36.8 (10 Apr 2022 13:19), Max: 36.8 (10 Apr 2022 13:19)  T(F): 98.3 (10 Apr 2022 13:19), Max: 98.3 (10 Apr 2022 13:19)  HR: 56 (10 Apr 2022 13:19) (56 - 94)  BP: 108/54 (10 Apr 2022 13:19) (103/54 - 117/62)  BP(mean): 76 (10 Apr 2022 13:19) (76 - 80)  RR: 18 (10 Apr 2022 13:19) (18 - 18)  SpO2: 98% (10 Apr 2022 13:19) (98% - 98%)  CAPILLARY BLOOD GLUCOSE        LABS:                            Culture - Tissue with Gram Stain (collected 08 Apr 2022 11:00)  Source: .Tissue None  Gram Stain (09 Apr 2022 04:58):    No polymorphonuclear leukocytes seen per low power field    No organisms seen per oil power field  Preliminary Report (10 Apr 2022 10:23):    No growth to date.    Culture - Surgical Swab (collected 08 Apr 2022 11:00)  Source: .Surgical Swab None  Preliminary Report (09 Apr 2022 23:13):    Few Proteus mirabilis    Few Enterococcus faecalis      Consultant Notes Reviewed:  [x ] YES  [ ] NO  Care Discussed with Consultants/Other Providers/ Housestaff [ x] YES  [ ] NO  Radiology, labs, new studies personally reviewed.

## 2022-04-11 ENCOUNTER — TRANSCRIPTION ENCOUNTER (OUTPATIENT)
Age: 57
End: 2022-04-11

## 2022-04-11 VITALS
HEART RATE: 63 BPM | OXYGEN SATURATION: 98 % | RESPIRATION RATE: 18 BRPM | TEMPERATURE: 98 F | DIASTOLIC BLOOD PRESSURE: 66 MMHG | SYSTOLIC BLOOD PRESSURE: 112 MMHG

## 2022-04-11 LAB
-  AMPICILLIN/SULBACTAM: SIGNIFICANT CHANGE UP
-  AMPICILLIN: SIGNIFICANT CHANGE UP
-  CEFAZOLIN: SIGNIFICANT CHANGE UP
-  CLINDAMYCIN: SIGNIFICANT CHANGE UP
-  ERYTHROMYCIN: SIGNIFICANT CHANGE UP
-  GENTAMICIN: SIGNIFICANT CHANGE UP
-  OXACILLIN: SIGNIFICANT CHANGE UP
-  PENICILLIN: SIGNIFICANT CHANGE UP
-  RIFAMPIN: SIGNIFICANT CHANGE UP
-  TETRACYCLINE: SIGNIFICANT CHANGE UP
-  TETRACYCLINE: SIGNIFICANT CHANGE UP
-  TRIMETHOPRIM/SULFAMETHOXAZOLE: SIGNIFICANT CHANGE UP
-  VANCOMYCIN: SIGNIFICANT CHANGE UP
-  VANCOMYCIN: SIGNIFICANT CHANGE UP
METHOD TYPE: SIGNIFICANT CHANGE UP
METHOD TYPE: SIGNIFICANT CHANGE UP

## 2022-04-11 PROCEDURE — 99239 HOSP IP/OBS DSCHRG MGMT >30: CPT

## 2022-04-11 PROCEDURE — 74230 X-RAY XM SWLNG FUNCJ C+: CPT | Mod: 26

## 2022-04-11 RX ORDER — TIZANIDINE 4 MG/1
1 TABLET ORAL
Qty: 0 | Refills: 0 | DISCHARGE

## 2022-04-11 RX ADMIN — GABAPENTIN 300 MILLIGRAM(S): 400 CAPSULE ORAL at 19:21

## 2022-04-11 RX ADMIN — MUPIROCIN 1 APPLICATION(S): 20 OINTMENT TOPICAL at 05:18

## 2022-04-11 RX ADMIN — Medication 100 MILLIGRAM(S): at 05:19

## 2022-04-11 RX ADMIN — RALOXIFENE HYDROCHLORIDE 60 MILLIGRAM(S): 60 TABLET, COATED ORAL at 14:37

## 2022-04-11 RX ADMIN — Medication 1 TABLET(S): at 14:36

## 2022-04-11 RX ADMIN — MIDODRINE HYDROCHLORIDE 10 MILLIGRAM(S): 2.5 TABLET ORAL at 14:38

## 2022-04-11 RX ADMIN — MIDODRINE HYDROCHLORIDE 10 MILLIGRAM(S): 2.5 TABLET ORAL at 21:22

## 2022-04-11 RX ADMIN — Medication 100 MILLIGRAM(S): at 14:54

## 2022-04-11 RX ADMIN — Medication 100 MILLIGRAM(S): at 21:46

## 2022-04-11 RX ADMIN — Medication 5 MILLIGRAM(S): at 21:22

## 2022-04-11 RX ADMIN — PANTOPRAZOLE SODIUM 40 MILLIGRAM(S): 20 TABLET, DELAYED RELEASE ORAL at 14:37

## 2022-04-11 RX ADMIN — ENOXAPARIN SODIUM 40 MILLIGRAM(S): 100 INJECTION SUBCUTANEOUS at 14:38

## 2022-04-11 RX ADMIN — GABAPENTIN 300 MILLIGRAM(S): 400 CAPSULE ORAL at 05:19

## 2022-04-11 RX ADMIN — MIDODRINE HYDROCHLORIDE 10 MILLIGRAM(S): 2.5 TABLET ORAL at 05:18

## 2022-04-11 RX ADMIN — MUPIROCIN 1 APPLICATION(S): 20 OINTMENT TOPICAL at 18:27

## 2022-04-11 NOTE — DISCHARGE NOTE NURSING/CASE MANAGEMENT/SOCIAL WORK - PATIENT PORTAL LINK FT
You can access the FollowMyHealth Patient Portal offered by [facility] by registering at the following website: [email or web address]. By joining [facility]’s FollowMyHealth portal, you will also be able to view your health information using other applications (apps) compatible with our system.

## 2022-04-11 NOTE — PROGRESS NOTE ADULT - REASON FOR ADMISSION
Aspiration pneumonia

## 2022-04-11 NOTE — PROGRESS NOTE ADULT - TIME BILLING
time spent on review of labs, imaging studies, old records, obtaining history, personally examining patient, counselling and communicating with patient/ family, entering orders for medications/tests/etc, discussions with other health care providers, documentation in electronic health records, independent interpretation of labs, imaging/procedure results and care coordination.

## 2022-04-11 NOTE — DISCHARGE NOTE PROVIDER - PROVIDER TOKENS
PROVIDER:[TOKEN:[[id number]],FOLLOWUP:[1 week]] PROVIDER:[TOKEN:[[id number]],FOLLOWUP:[1 week]],PROVIDER:[TOKEN:[[id number]],FOLLOWUP:[1 week]],PROVIDER:[TOKEN:[[id number]],FOLLOWUP:[1-3 days]]

## 2022-04-11 NOTE — DISCHARGE NOTE PROVIDER - HOSPITAL COURSE
HPI:  57y/o female with a pmhx of down syndrome, non-verbal, hypothyroidism, osteoporosis coming from [facility] here for eval multiple episodes of vomiting that began today. Pt was also found to be hypoxic and febrile. History was obtained from aid at bedside. This morning patient "vomited a lot", NBNB, then was found to be hypotensive and hypoxic to 86% on RA. Patient had been acting her usual self until then with no indication of distress. At baseline she sits in wheelchair, is nonverbal, and does not communicate in any way.    ED course: T 101, , BP min 71/36, 94% on 4L. UA positive. Initial lactate 2.2. CT showed bilateral interstitial pulmonary thickening, which may reflect aspiration pneumonitis, and bladder wall thickening.  She received azithromycin, ceftriaxone and 2.5L LR. Lactate improved to 1.8 but BP did not, so she was started on levophed.      Family: (foster mother): Ana Watersar, [phone]; Consumer advisory board: Amy Rich, [phone]    Ms. Linton was managed in the hospitals for 18 days for Right Forefoot Plantar Ulcer, 1st MTP Joint. On 3/28, pt noted to have right forefoot ulcer. XR R Foot demonstrated findings consistent with septic arthritis/osteomyelitis at the first MTP joint. Wound Cx 3/28 demonstrated ZACHARY (S lugdunensis). Arterial duplex demonstrated normal arterial flow in b/l LE. ESR 40 and CRP 16. S/P excisional debridement on 4/1 w/ Podiatry Team. She was covered w/ Cefazolin IV. She relieved hallux and 1st MT partial amputation on 4/7 and she will be discharged w/ Augmentin Po back to the group home services. She also presented in Septic shock s/p pressors on admission 2/2 Acute hypoxic respiratory failure 2/2 Aspiration pneumonia/pneumonitis and Acute cystitis. MRSA was pos and CXR w/ LL opacities. BCx, UCx, strep/legionella however, NGTD. Her respiratory failure was tx w/ short course of VAnc /cefepime, IVF and steroid. All other comorbidities were addresses and managed appropriately.     Ms. Linton is a functional Quad, remains Non-verbal at baseline and Wheelchair/bed bound. She hemodynamically stable, afebrile and ready to be discharged back to the group home. All discussions and arrangements were made w/ the group home.

## 2022-04-11 NOTE — DISCHARGE NOTE NURSING/CASE MANAGEMENT/SOCIAL WORK - NSDCPEFALRISK_GEN_ALL_CORE
For information on Fall & Injury Prevention, visit: [email or web address] OR  [email or web address] OR  https://www.cdc.gov/steadi/patient.html

## 2022-04-11 NOTE — PROGRESS NOTE ADULT - NS ATTEST RISK PROBLEM GEN_ALL_CORE FT
asp-n PNA, resolving sepsis
asp-n PNA, resolving sepsis, hypotension
asp-n PNA, resolving sepsis
asp-n PNA, resolving sepsis, hypotension
asp-n PNA, resolving sepsis

## 2022-04-11 NOTE — SWALLOW VFSS/MBS ASSESSMENT ADULT - SLP PERTINENT HISTORY OF CURRENT PROBLEM
57y/o female with a pmhx of down syndrome, non-verbal, hypothyroidism, osteoporosis coming from [facility] here for eval multiple episodes of vomiting that began today. Pt was also found to be hypoxic and febrile. History was obtained from aid at bedside. This morning patient "vomited a lot", NBNB, then was found to be hypotensive and hypoxic to 86% on RA. Patient had been acting her usual self until then with no indication of distress. At baseline she sits in wheelchair, is nonverbal, and does not communicate in any way.

## 2022-04-11 NOTE — DISCHARGE NOTE PROVIDER - NSDCFUADDINST_GEN_ALL_CORE_FT
Wound care: Weight Bearing as tolerated however, Wheelchair/Bed bound. Surgical site can be cleansed with soap and water at home. Apply Xeroform / Dry sterile dressing / wrap with Kerlix Every 24 hours

## 2022-04-11 NOTE — PROGRESS NOTE ADULT - SUBJECTIVE AND OBJECTIVE BOX
Patient is a 56y old  Female who presents with a chief complaint of Aspiration pneumonia (07 Apr 2022 10:05)    INTERVAL HPI/OVERNIGHT EVENTS: Patient was examined and seen at bedside. This morning pt is resting comfortably in bed and staff report no new issues or overnight events.   ROS: unable to access due to baseline mental status    InitialHPI:  57y/o female with a pmhx of down syndrome, non-verbal, hypothyroidism, osteoporosis coming from [facility] here for eval multiple episodes of vomiting that began today. Pt was also found to be hypoxic and febrile. History was obtained from aid at bedside. This morning patient "vomited a lot", NBNB, then was found to be hypotensive and hypoxic to 86% on RA. Patient had been acting her usual self until then with no indication of distress. At baseline she sits in wheelchair, is nonverbal, and does not communicate in any way.    ED course: T 101, , BP min 71/36, 94% on 4L. UA positive. Initial lactate 2.2. CT showed bilateral interstitial pulmonary thickening, which may reflect aspiration pneumonitis, and bladder wall thickening.  She received azithromycin, ceftriaxone and 2.5L LR. Lactate improved to 1.8 but BP did not, so she was started on levophed.      Family: (foster mother): Ana Katz, [phone]  Consumer advisory board: Amy Rich, [phone]     (24 Mar 2022 20:31)    PAST MEDICAL & SURGICAL HISTORY:  Down syndrome    Osteoporosis    Mild anemia    Neuropathy    S/P debridement  of R hip on 3/2/21        General: NAD, alert not following commands  HEENT:  EOMI, no LAD  CV: S1 S2  Resp: decreased breath sounds at bases  GI: NT/ND/S +BS  MS: no clubbing/cyanosis/edema, + pulses b/l;  RLE c/d/i dsg  Neuro: unable to access            MEDICATIONS  (STANDING):  ceFAZolin   IVPB 2000 milliGRAM(s) IV Intermittent every 8 hours  collagenase Ointment 1 Application(s) Topical two times a day  enoxaparin Injectable 40 milliGRAM(s) SubCutaneous every 24 hours  gabapentin Solution 300 milliGRAM(s) Oral every 12 hours  lactated ringers. 1000 milliLiter(s) (50 mL/Hr) IV Continuous <Continuous>  melatonin 5 milliGRAM(s) Oral at bedtime  midodrine. 10 milliGRAM(s) Oral every 8 hours  multivitamin 1 Tablet(s) Oral daily  mupirocin 2% Ointment 1 Application(s) Topical two times a day  pantoprazole   Suspension 40 milliGRAM(s) Oral daily  raloxifene 60 milliGRAM(s) Oral daily    MEDICATIONS  (PRN):  acetaminophen     Tablet .. 650 milliGRAM(s) Oral every 6 hours PRN Temp greater or equal to 38C (100.4F), Moderate Pain (4 - 6), Severe Pain (7 - 10)    Home Medications:  ascorbic acid 500 mg oral tablet: 1 tab(s) orally once a day (11 Apr 2022 10:59)  collagenase 250 units/g topical ointment: 1 application topically 2 times a day (11 Apr 2022 10:59)  Ferrex-150 oral capsule: 1 cap(s) orally once a day (11 Apr 2022 10:59)  gabapentin 300 mg oral capsule: 1 cap(s) orally 2 times a day (11 Apr 2022 10:59)  melatonin 5 mg oral tablet: 1 tab(s) orally once a day (at bedtime) (19 Mar 2021 10:22)  midodrine 10 mg oral tablet: 1 tab(s) orally 3 times a day (29 Mar 2021 09:58)  Multiple Vitamins oral tablet: 1 tab(s) orally once a day (29 Mar 2021 09:58)  raloxifene 60 mg oral tablet: 1 tab(s) orally once a day (19 Mar 2021 10:22)  sodium hypochlorite 0.25% topical solution: 1 application topically 2 times a day (29 Mar 2021 09:58)  tiZANidine 2 mg oral tablet: 1 tab(s) orally 2 times a day, As Needed (11 Apr 2022 12:36)  Vitamin D3 2000 intl units (50 mcg) oral capsule: 1 tab(s) orally once a day with meal (19 Mar 2021 10:22)    Vital Signs Last 24 Hrs  T(C): 35.8 (11 Apr 2022 04:52), Max: 36.2 (10 Apr 2022 20:54)  T(F): 96.5 (11 Apr 2022 04:52), Max: 97.2 (10 Apr 2022 20:54)  HR: 73 (11 Apr 2022 04:52) (65 - 73)  BP: 114/70 (11 Apr 2022 04:52) (114/70 - 116/68)  BP(mean): 88 (11 Apr 2022 04:52) (85 - 88)  RR: 18 (10 Apr 2022 20:54) (18 - 18)  SpO2: --  CAPILLARY BLOOD GLUCOSE        LABS:                        13.1   8.29  )-----------( 229      ( 10 Apr 2022 18:11 )             38.0     04-10    143  |  104  |  6<L>  ----------------------------<  127<H>  3.7   |  28  |  0.7    Ca    8.0<L>      10 Apr 2022 18:11    TPro  5.3<L>  /  Alb  3.0<L>  /  TBili  <0.2  /  DBili  x   /  AST  17  /  ALT  <5  /  AlkPhos  97  04-10    LIVER FUNCTIONS - ( 10 Apr 2022 18:11 )  Alb: 3.0 g/dL / Pro: 5.3 g/dL / ALK PHOS: 97 U/L / ALT: <5 U/L / AST: 17 U/L / GGT: x                           Consultant Notes Reviewed:  [x ] YES  [ ] NO  Care Discussed with Consultants/Other Providers/ Housestaff [ x] YES  [ ] NO  Radiology, labs, new studies personally reviewed.

## 2022-04-11 NOTE — DISCHARGE NOTE PROVIDER - NSDCMRMEDTOKEN_GEN_ALL_CORE_FT
amoxicillin-clavulanate 875 mg-125 mg oral tablet: 1 tab(s) orally 2 times a day   ascorbic acid 500 mg oral tablet: 1 tab(s) orally once a day  collagenase 250 units/g topical ointment: 1 application topically 2 times a day  Ferrex-150 oral capsule: 1 cap(s) orally once a day  gabapentin 300 mg oral capsule: 1 cap(s) orally 2 times a day  melatonin 5 mg oral tablet: 1 tab(s) orally once a day (at bedtime)  midodrine 10 mg oral tablet: 1 tab(s) orally 3 times a day  Multiple Vitamins oral tablet: 1 tab(s) orally once a day  raloxifene 60 mg oral tablet: 1 tab(s) orally once a day  sodium hypochlorite 0.25% topical solution: 1 application topically 2 times a day  tiZANidine 2 mg oral tablet: 1 tab(s) orally 2 times a day  Vitamin D3 2000 intl units (50 mcg) oral capsule: 1 tab(s) orally once a day with meal   amoxicillin-clavulanate 875 mg-125 mg oral tablet: 1 tab(s) orally 2 times a day   ascorbic acid 500 mg oral tablet: 1 tab(s) orally once a day  collagenase 250 units/g topical ointment: 1 application topically 2 times a day  Ferrex-150 oral capsule: 1 cap(s) orally once a day  gabapentin 300 mg oral capsule: 1 cap(s) orally 2 times a day  melatonin 5 mg oral tablet: 1 tab(s) orally once a day (at bedtime)  midodrine 10 mg oral tablet: 1 tab(s) orally 3 times a day  Multiple Vitamins oral tablet: 1 tab(s) orally once a day  raloxifene 60 mg oral tablet: 1 tab(s) orally once a day  sodium hypochlorite 0.25% topical solution: 1 application topically 2 times a day  tiZANidine 2 mg oral tablet: 1 tab(s) orally 2 times a day, As Needed  Vitamin D3 2000 intl units (50 mcg) oral capsule: 1 tab(s) orally once a day with meal

## 2022-04-11 NOTE — DISCHARGE NOTE PROVIDER - CARE PROVIDER_API CALL
Kingsley Everett (DPM)  Podiatric Medicine and Surgery  [address], [address], [address]  [place] [address]  Phone: [phone]  Fax: [phone]  Follow Up Time: 1 week   Kingsley Everett (DPM)  Podiatric Medicine and Surgery  [address] [facility] [address] [facility], [address], [address]  [place] [address]  Phone: [phone]  Fax: [phone]  Follow Up Time: 1 week    Rahul Gambino)  Infectious Disease; Internal Medicine  [address] [place] [address]  Phone: [phone]  Fax: [phone]  Follow Up Time: 1 week    Maggie Crow)  Medicine  [address] [place] [address]  Phone: [phone]  Fax: [phone]  Follow Up Time: 1-3 days

## 2022-04-11 NOTE — DISCHARGE NOTE PROVIDER - NSDCCPCAREPLAN_GEN_ALL_CORE_FT
PRINCIPAL DISCHARGE DIAGNOSIS  Diagnosis: Septic shock  Assessment and Plan of Treatment: You had a sudden blood stream infection process that was asffecting your organ system causing them to fail. You recieved a course of antibiotics to rid your body of the infection. You will continue this antibiotics for a few more weeks until they are completed. Please adhere to this schedule and improtant therapy.   Please alert your group home care provider or return to the ER should your symptoms worsens with difficulty breathing, feverish, weakness, nausea and vomiting.      SECONDARY DISCHARGE DIAGNOSES  Diagnosis: Pneumonitis  Assessment and Plan of Treatment: You had a lung reaction thats most likely due to your poor swallow reflex. You recieved a course of antibiotics and steriod which help to resolved this concern. You were evaluated by the swallow  who recommended further studies to evealuate your ability to swallow.    Diagnosis: Acute UTI  Assessment and Plan of Treatment: You had a urinary tract infection that was resolved.    Diagnosis: Osteomyelitis of toe  Assessment and Plan of Treatment: XR of the R foot obsereved infectious osteomyelitis. You recieved 2 debridement as well as antibiotics. You also recieved R foot big toe and joint partial amputation.    Diagnosis: Septic arthritis of IP joint of toe, right  Assessment and Plan of Treatment: XR of the R foot obsereved infectious osteomyelitis. You recieved 2 debridement as well as antibiotics. You also recieved R foot big toe and joint partial amputation.     PRINCIPAL DISCHARGE DIAGNOSIS  Diagnosis: Septic shock  Assessment and Plan of Treatment: You had an infection process that was asffecting your organ system causing them to fail. You recieved a course of antibiotics to rid your body of the infection. You will continue this antibiotics for a few more weeks until they are completed (for 4wks). Please adhere to this schedule and important therapy. Aspriration precautions.  Please alert your group home care provider or return to the ER should your symptoms worsens with difficulty breathing, feverish, weakness, nausea and vomiting.      SECONDARY DISCHARGE DIAGNOSES  Diagnosis: Pneumonitis  Assessment and Plan of Treatment: You had a lung reaction thats most likely due to your poor swallow reflex. You recieved a course of antibiotics and steriod which help to resolved this concern. You were evaluated by the swallow  who recommended further studies to evealuate your ability to swallow.    Diagnosis: Osteomyelitis of toe  Assessment and Plan of Treatment: XR of the R foot obsereved infectious osteomyelitis. You recieved 2 debridement as well as antibiotics. You also recieved R foot big toe and joint partial amputation. Please finish the antibiotic course and follow up with infectious disease doctors and foot doctors. Offloading. Apply Xeroform / DSD / Kerlix      Diagnosis: Septic arthritis of IP joint of toe, right  Assessment and Plan of Treatment: XR of the R foot obsereved infectious osteomyelitis. You recieved 2 debridement as well as antibiotics. You also recieved R foot big toe and joint partial amputation.

## 2022-04-11 NOTE — DISCHARGE NOTE PROVIDER - NSDCFUADDAPPT_GEN_ALL_CORE_FT
Please follow up with your Group Home Care provider for post hospital management and continuity of care.

## 2022-04-11 NOTE — PROGRESS NOTE ADULT - ASSESSMENT
56F w/ h/o down syndrome, non-verbal, hypothyroidism, osteoporosis presents after multiple episodes of vomiting admitted for septic shock.    #Right Forefoot Plantar Ulcer, 1st MTP Joint  #Septic Arthritis vs Osteomyelitis  On 3/28, pt noted to have right forefoot ulcer. XR R Foot demonstrated findings consistent with septic arthritis/osteomyelitis at the first MTP joint. Wound Cx 3/28 demonstrated ZACHARY (S lugdunensis). Arterial duplex demonstrated normal arterial flow in b/l LE. ESR 40 and CRP 16. S/P excisional debridement on 4/1 w/ Podiatry Team.  - c/w cefazolin 2g IV Q8H  (3/31-present)  - Transition to Augmentin 875mg PO BID for 4 weeks on discharge  - s/p repeat debridement on 4/8. Cleared by podiatry and ID  - PO ABx on d/c as per ID    #Septic shock s/p pressors POA (resolved)  #Acute hypoxic respiratory failure 2/2 Aspiration pneumonia/pneumonitis  #Acute cystitis  - MRSA positive  - CXR: Persistent left-sided opacities.  - F/u BCx and Urinary Cx. urine negative, no growth to date in blood  - F/u sputum culture. Strep/Legionella Ag both negative  - cont midodrine 10mg q8  - s/p 7 days Vancomycin and Cefepime   - decrease hydrocortisone 50mg IV from Q12H to Q24H (Q8H 3/26-4/3; Q12H 4/4 - 4/6; Q24H); started in setting of refractory septic shock, now being titrated down  - off IVFs and hydrocortisone    #Osteoporosis  - c/w raloxifene 60mg PO QD    #Neuropathy  - c/w gabapentin 400mg PO BID    #Down Syndrome  #Functional quadriplegia  Non-verbal at baseline. Wheelchair/bed bound. Followed by [employer].    DVT PPX: Lovenox 40mg SQ QD  GI PPX: pantoprazole 40mg PO QD  DIET: Pureed w/ Mildly Thick Liquids; NPO after MN  ACTIVITY: WBAT  CODE STATUS: Full Code  DISPOSITION: From [facility] ([facility])    High risk pt.    Disposition: Group home    My note supersedes the residents note should a discrepancy arise.    Chart and notes personally reviewed.  Care Discussed with Consultants/Other Providers/ Housestaff [ x] YES [ ] NO   Radiology, labs, old records personally reviewed.    discussed w/ housestaff, nursing, case management

## 2022-04-11 NOTE — DISCHARGE NOTE PROVIDER - NSDCCPTREATMENT_GEN_ALL_CORE_FT
PRINCIPAL PROCEDURE  Procedure: Debridement, bone, foot  Findings and Treatment: Your right foot was cleaned up to removed dead and infectious tissues.      SECONDARY PROCEDURE  Procedure: Amputation, toe, partial  Findings and Treatment: part of the bone in your right big toe was removed to prevent any future infection as it was dead and infectious.     PRINCIPAL PROCEDURE  Procedure: Debridement, bone, foot  Findings and Treatment: Your right foot was cleaned up to removed dead and infectious tissues.      SECONDARY PROCEDURE  Procedure: Amputation, toe, partial  Findings and Treatment: part of the bone in your right big toe was removed to prevent any future infection as it was dead and infectious.    Procedure: Modified barium swallow  Findings and Treatment: Competed. your diet was modified to Pureed diet w/ thin liquids

## 2022-04-11 NOTE — PROGRESS NOTE ADULT - PROVIDER SPECIALTY LIST ADULT
Cardiology
Internal Medicine
Podiatry
Critical Care
Internal Medicine
Podiatry
Critical Care
Internal Medicine
Podiatry
Podiatry
Internal Medicine

## 2022-04-12 LAB
-  AMPICILLIN/SULBACTAM: SIGNIFICANT CHANGE UP
-  CEFAZOLIN: SIGNIFICANT CHANGE UP
-  CLINDAMYCIN: SIGNIFICANT CHANGE UP
-  ERYTHROMYCIN: SIGNIFICANT CHANGE UP
-  GENTAMICIN: SIGNIFICANT CHANGE UP
-  OXACILLIN: SIGNIFICANT CHANGE UP
-  PENICILLIN: SIGNIFICANT CHANGE UP
-  RIFAMPIN: SIGNIFICANT CHANGE UP
-  TETRACYCLINE: SIGNIFICANT CHANGE UP
-  TRIMETHOPRIM/SULFAMETHOXAZOLE: SIGNIFICANT CHANGE UP
-  VANCOMYCIN: SIGNIFICANT CHANGE UP
METHOD TYPE: SIGNIFICANT CHANGE UP

## 2022-04-13 LAB
-  AMPICILLIN/SULBACTAM: SIGNIFICANT CHANGE UP
-  CEFAZOLIN: SIGNIFICANT CHANGE UP
-  CLINDAMYCIN: SIGNIFICANT CHANGE UP
-  ERYTHROMYCIN: SIGNIFICANT CHANGE UP
-  GENTAMICIN: SIGNIFICANT CHANGE UP
-  OXACILLIN: SIGNIFICANT CHANGE UP
-  PENICILLIN: SIGNIFICANT CHANGE UP
-  RIFAMPIN: SIGNIFICANT CHANGE UP
-  TETRACYCLINE: SIGNIFICANT CHANGE UP
-  TRIMETHOPRIM/SULFAMETHOXAZOLE: SIGNIFICANT CHANGE UP
-  VANCOMYCIN: SIGNIFICANT CHANGE UP
CULTURE RESULTS: SIGNIFICANT CHANGE UP
METHOD TYPE: SIGNIFICANT CHANGE UP
ORGANISM # SPEC MICROSCOPIC CNT: SIGNIFICANT CHANGE UP
SPECIMEN SOURCE: SIGNIFICANT CHANGE UP
SURGICAL PATHOLOGY STUDY: SIGNIFICANT CHANGE UP

## 2022-04-14 ENCOUNTER — OUTPATIENT (OUTPATIENT)
Dept: OUTPATIENT SERVICES | Facility: HOSPITAL | Age: 57
LOS: 1 days | Discharge: HOME | End: 2022-04-14

## 2022-04-14 ENCOUNTER — APPOINTMENT (OUTPATIENT)
Dept: PODIATRY | Facility: CLINIC | Age: 57
End: 2022-04-14
Payer: COMMERCIAL

## 2022-04-14 DIAGNOSIS — Q90.9 DOWN SYNDROME, UNSPECIFIED: ICD-10-CM

## 2022-04-14 DIAGNOSIS — Z98.890 OTHER SPECIFIED POSTPROCEDURAL STATES: Chronic | ICD-10-CM

## 2022-04-14 DIAGNOSIS — M86.9 OSTEOMYELITIS, UNSPECIFIED: ICD-10-CM

## 2022-04-14 DIAGNOSIS — Z74.01 BED CONFINEMENT STATUS: ICD-10-CM

## 2022-04-14 DIAGNOSIS — A41.9 SEPSIS, UNSPECIFIED ORGANISM: ICD-10-CM

## 2022-04-14 DIAGNOSIS — R00.1 BRADYCARDIA, UNSPECIFIED: ICD-10-CM

## 2022-04-14 DIAGNOSIS — Z99.3 DEPENDENCE ON WHEELCHAIR: ICD-10-CM

## 2022-04-14 DIAGNOSIS — M81.0 AGE-RELATED OSTEOPOROSIS WITHOUT CURRENT PATHOLOGICAL FRACTURE: ICD-10-CM

## 2022-04-14 DIAGNOSIS — F79 UNSPECIFIED INTELLECTUAL DISABILITIES: ICD-10-CM

## 2022-04-14 DIAGNOSIS — R65.21 SEVERE SEPSIS WITH SEPTIC SHOCK: ICD-10-CM

## 2022-04-14 DIAGNOSIS — M00.071 STAPHYLOCOCCAL ARTHRITIS, RIGHT ANKLE AND FOOT: ICD-10-CM

## 2022-04-14 DIAGNOSIS — E87.6 HYPOKALEMIA: ICD-10-CM

## 2022-04-14 DIAGNOSIS — Z79.810 LONG TERM (CURRENT) USE OF SELECTIVE ESTROGEN RECEPTOR MODULATORS (SERMS): ICD-10-CM

## 2022-04-14 DIAGNOSIS — B95.62 METHICILLIN RESISTANT STAPHYLOCOCCUS AUREUS INFECTION AS THE CAUSE OF DISEASES CLASSIFIED ELSEWHERE: ICD-10-CM

## 2022-04-14 DIAGNOSIS — E88.09 OTHER DISORDERS OF PLASMA-PROTEIN METABOLISM, NOT ELSEWHERE CLASSIFIED: ICD-10-CM

## 2022-04-14 DIAGNOSIS — K76.0 FATTY (CHANGE OF) LIVER, NOT ELSEWHERE CLASSIFIED: ICD-10-CM

## 2022-04-14 DIAGNOSIS — B95.61 METHICILLIN SUSCEPTIBLE STAPHYLOCOCCUS AUREUS INFECTION AS THE CAUSE OF DISEASES CLASSIFIED ELSEWHERE: ICD-10-CM

## 2022-04-14 DIAGNOSIS — G62.9 POLYNEUROPATHY, UNSPECIFIED: ICD-10-CM

## 2022-04-14 DIAGNOSIS — J96.01 ACUTE RESPIRATORY FAILURE WITH HYPOXIA: ICD-10-CM

## 2022-04-14 DIAGNOSIS — I08.2 RHEUMATIC DISORDERS OF BOTH AORTIC AND TRICUSPID VALVES: ICD-10-CM

## 2022-04-14 DIAGNOSIS — N30.00 ACUTE CYSTITIS WITHOUT HEMATURIA: ICD-10-CM

## 2022-04-14 DIAGNOSIS — R11.10 VOMITING, UNSPECIFIED: ICD-10-CM

## 2022-04-14 DIAGNOSIS — J18.9 PNEUMONIA, UNSPECIFIED ORGANISM: ICD-10-CM

## 2022-04-14 DIAGNOSIS — D64.9 ANEMIA, UNSPECIFIED: ICD-10-CM

## 2022-04-14 DIAGNOSIS — J69.0 PNEUMONITIS DUE TO INHALATION OF FOOD AND VOMIT: ICD-10-CM

## 2022-04-14 DIAGNOSIS — R53.2 FUNCTIONAL QUADRIPLEGIA: ICD-10-CM

## 2022-04-14 DIAGNOSIS — L89.894 PRESSURE ULCER OF OTHER SITE, STAGE 4: ICD-10-CM

## 2022-04-14 LAB
CULTURE RESULTS: SIGNIFICANT CHANGE UP
ORGANISM # SPEC MICROSCOPIC CNT: SIGNIFICANT CHANGE UP
SPECIMEN SOURCE: SIGNIFICANT CHANGE UP

## 2022-04-14 PROCEDURE — 99213 OFFICE O/P EST LOW 20 MIN: CPT

## 2022-04-22 NOTE — PHYSICAL EXAM
[General Appearance - Alert] : alert [General Appearance - In No Acute Distress] : in no acute distress [General Appearance - Well Nourished] : well nourished [General Appearance - Well Developed] : well developed [Sensation] : the sensory exam was normal to light touch and pinprick [No Focal Deficits] : no focal deficits [de-identified] : SHAN mclean amputated [FreeTextEntry1] : R foot hallux amputation sx wound site sutures nicely intact without ischemic changes and no sign of infection; healing nicely as expected

## 2022-04-22 NOTE — ASSESSMENT
[FreeTextEntry1] : Assessment: s/p R hallux amputation post op f/u, sx was done a week ago\par Plan\par Pt seen and eval; \par R hallux sx site healing as expected; Irrigated and dressed w/ xeroform / DSD / Kerlix / ACE; q24\par Will eval in 2 weeks, plan for removing sutures; \par F/u in 2 weeks

## 2022-04-22 NOTE — END OF VISIT
[] : Resident [Resident] : Resident [FreeTextEntry3] : agree with above note.  \par Was present and instructing resident during visit. \par All Procedure performed under direct supervision.  \par TANK Everett DPM\par  [FreeTextEntry2] : agree with above note.  \par Was present and instructing resident during visit. \par All Procedure performed under direct supervision.  \par TANK Everett DPM\par

## 2022-04-22 NOTE — HISTORY OF PRESENT ILLNESS
[FreeTextEntry1] : CC: "I had sx a week ago on my R foot"\par HPI:\par Mrs. Linton had R hallux amputation sx a week ago w/ skinflap; today, first f/u for postop care, came in w/ caregiver; eval R foot;

## 2022-04-26 DIAGNOSIS — Z94.5 SKIN TRANSPLANT STATUS: ICD-10-CM

## 2022-04-26 DIAGNOSIS — S98.139A COMPLETE TRAUMATIC AMPUTATION OF ONE UNSPECIFIED LESSER TOE, INITIAL ENCOUNTER: ICD-10-CM

## 2022-04-29 ENCOUNTER — APPOINTMENT (OUTPATIENT)
Dept: PODIATRY | Facility: CLINIC | Age: 57
End: 2022-04-29
Payer: COMMERCIAL

## 2022-04-29 ENCOUNTER — OUTPATIENT (OUTPATIENT)
Dept: OUTPATIENT SERVICES | Facility: HOSPITAL | Age: 57
LOS: 1 days | Discharge: HOME | End: 2022-04-29

## 2022-04-29 DIAGNOSIS — R26.2 DIFFICULTY IN WALKING, NOT ELSEWHERE CLASSIFIED: ICD-10-CM

## 2022-04-29 DIAGNOSIS — M79.671 PAIN IN RIGHT FOOT: ICD-10-CM

## 2022-04-29 DIAGNOSIS — S91.301A UNSPECIFIED OPEN WOUND, RIGHT FOOT, INITIAL ENCOUNTER: ICD-10-CM

## 2022-04-29 DIAGNOSIS — Z98.890 OTHER SPECIFIED POSTPROCEDURAL STATES: Chronic | ICD-10-CM

## 2022-04-29 PROCEDURE — 99213 OFFICE O/P EST LOW 20 MIN: CPT

## 2022-04-29 NOTE — HISTORY OF PRESENT ILLNESS
[FreeTextEntry1] : CC: s/p R hallux amputation post op eval\par HPI:\par Mrs. Linton is a 56 yr old female pt who presents w/ s/p R hallux amputation 3 weeks ago; eval R foot

## 2022-04-29 NOTE — ASSESSMENT
[FreeTextEntry1] : Assessment: s/p R hallux amputation 3 weeks ago\par Plan: \par Pt seen and eval\par No signs of infection \par Removed R hallux amputation sx suture; stable wound\par F/u in 2 weeks

## 2022-04-29 NOTE — PHYSICAL EXAM
[General Appearance - Alert] : alert [General Appearance - In No Acute Distress] : in no acute distress [General Appearance - Well Nourished] : well nourished [General Appearance - Well Developed] : well developed [Sensation] : the sensory exam was normal to light touch and pinprick [No Focal Deficits] : no focal deficits [de-identified] : s/p  R hallux amputation [FreeTextEntry1] : R foot sx site healed nicely; stable wound, sutures intact

## 2022-05-12 ENCOUNTER — APPOINTMENT (OUTPATIENT)
Dept: PODIATRY | Facility: CLINIC | Age: 57
End: 2022-05-12

## 2022-05-22 NOTE — PROVIDER CONTACT NOTE (MEDICATION) - DATE AND TIME:
31-Mar-2021 04:42 I personally performed the service described in the documentation recorded by the scribe in my presence, and it accurately and completely records my words and actions.

## 2022-12-07 NOTE — PROVIDER CONTACT NOTE (OTHER) - REASON
Hypotension
PT /56 HR 72
PT wound care
Pt /65 HR 84
pt BP
Pt wound care orders
pt BP 89/50 HR 64
Pt dressing orders
moderate

## 2023-05-25 ENCOUNTER — OUTPATIENT (OUTPATIENT)
Dept: OUTPATIENT SERVICES | Facility: HOSPITAL | Age: 58
LOS: 1 days | End: 2023-05-25
Payer: COMMERCIAL

## 2023-05-25 DIAGNOSIS — Z12.31 ENCOUNTER FOR SCREENING MAMMOGRAM FOR MALIGNANT NEOPLASM OF BREAST: ICD-10-CM

## 2023-05-25 DIAGNOSIS — R92.2 INCONCLUSIVE MAMMOGRAM: ICD-10-CM

## 2023-05-25 DIAGNOSIS — Z98.890 OTHER SPECIFIED POSTPROCEDURAL STATES: Chronic | ICD-10-CM

## 2023-05-25 PROCEDURE — 76641 ULTRASOUND BREAST COMPLETE: CPT | Mod: 50

## 2023-05-25 PROCEDURE — 76641 ULTRASOUND BREAST COMPLETE: CPT | Mod: 26,50

## 2023-05-26 DIAGNOSIS — Z12.31 ENCOUNTER FOR SCREENING MAMMOGRAM FOR MALIGNANT NEOPLASM OF BREAST: ICD-10-CM

## 2023-05-26 DIAGNOSIS — R92.2 INCONCLUSIVE MAMMOGRAM: ICD-10-CM

## 2023-06-21 ENCOUNTER — APPOINTMENT (OUTPATIENT)
Dept: GASTROENTEROLOGY | Facility: CLINIC | Age: 58
End: 2023-06-21
Payer: COMMERCIAL

## 2023-06-21 ENCOUNTER — OUTPATIENT (OUTPATIENT)
Dept: OUTPATIENT SERVICES | Facility: HOSPITAL | Age: 58
LOS: 1 days | End: 2023-06-21
Payer: COMMERCIAL

## 2023-06-21 VITALS
WEIGHT: 118 LBS | HEIGHT: 55 IN | TEMPERATURE: 97 F | SYSTOLIC BLOOD PRESSURE: 93 MMHG | HEART RATE: 65 BPM | DIASTOLIC BLOOD PRESSURE: 64 MMHG | BODY MASS INDEX: 27.31 KG/M2

## 2023-06-21 DIAGNOSIS — Z00.00 ENCOUNTER FOR GENERAL ADULT MEDICAL EXAMINATION WITHOUT ABNORMAL FINDINGS: ICD-10-CM

## 2023-06-21 DIAGNOSIS — Z12.11 ENCOUNTER FOR SCREENING FOR MALIGNANT NEOPLASM OF COLON: ICD-10-CM

## 2023-06-21 DIAGNOSIS — Z98.890 OTHER SPECIFIED POSTPROCEDURAL STATES: Chronic | ICD-10-CM

## 2023-06-21 PROCEDURE — 99203 OFFICE O/P NEW LOW 30 MIN: CPT | Mod: GC

## 2023-06-21 PROCEDURE — 99203 OFFICE O/P NEW LOW 30 MIN: CPT

## 2023-06-21 NOTE — HISTORY OF PRESENT ILLNESS
[FreeTextEntry1] : 56y/o female with a pmhx of down syndrome, non-verbal, hypothyroidism, and osteoporosis. Here today for initial evaluation. Referred by her PCP for colorectal ca screening. Aid with pt. unsure of FH. Denies any blood thinners. \par \par Denies any weight loss, melena, diarrhea, constipation, vomiting.

## 2023-06-21 NOTE — ASSESSMENT
[FreeTextEntry1] : 56y/o female with a pmhx of down syndrome, non-verbal, hypothyroidism, and osteoporosis. Here today for initial evaluation. \par \par #Screening for colorectal cancer \par - Unsure of family history \par - No red flag symptoms \par - CBC 10/22 showed hgb 13.1, no anemia \par - not on blood thinners \par - Will do Colonoscopy\par - Bowel prep sent  \par - F/u after colonoscopy \par

## 2023-06-28 DIAGNOSIS — Z12.11 ENCOUNTER FOR SCREENING FOR MALIGNANT NEOPLASM OF COLON: ICD-10-CM

## 2023-08-03 ENCOUNTER — INPATIENT (INPATIENT)
Facility: HOSPITAL | Age: 58
LOS: 7 days | Discharge: HOME | DRG: 871 | End: 2023-08-11
Attending: INTERNAL MEDICINE | Admitting: INTERNAL MEDICINE
Payer: COMMERCIAL

## 2023-08-03 DIAGNOSIS — Z98.890 OTHER SPECIFIED POSTPROCEDURAL STATES: Chronic | ICD-10-CM

## 2023-08-03 PROCEDURE — 99291 CRITICAL CARE FIRST HOUR: CPT

## 2023-08-03 PROCEDURE — 99053 MED SERV 10PM-8AM 24 HR FAC: CPT

## 2023-08-04 VITALS
SYSTOLIC BLOOD PRESSURE: 114 MMHG | OXYGEN SATURATION: 70 % | RESPIRATION RATE: 17 BRPM | TEMPERATURE: 98 F | WEIGHT: 110.01 LBS | HEART RATE: 137 BPM | DIASTOLIC BLOOD PRESSURE: 78 MMHG

## 2023-08-04 DIAGNOSIS — A41.9 SEPSIS, UNSPECIFIED ORGANISM: ICD-10-CM

## 2023-08-04 LAB
ALBUMIN SERPL ELPH-MCNC: 3.6 G/DL — SIGNIFICANT CHANGE UP (ref 3.5–5.2)
ALP SERPL-CCNC: 90 U/L — SIGNIFICANT CHANGE UP (ref 30–115)
ALT FLD-CCNC: 19 U/L — SIGNIFICANT CHANGE UP (ref 0–41)
ANION GAP SERPL CALC-SCNC: 13 MMOL/L — SIGNIFICANT CHANGE UP (ref 7–14)
APPEARANCE UR: ABNORMAL
APTT BLD: 26.3 SEC — LOW (ref 27–39.2)
AST SERPL-CCNC: 53 U/L — HIGH (ref 0–41)
BASOPHILS # BLD AUTO: 0.07 K/UL — SIGNIFICANT CHANGE UP (ref 0–0.2)
BASOPHILS NFR BLD AUTO: 0.4 % — SIGNIFICANT CHANGE UP (ref 0–1)
BILIRUB SERPL-MCNC: <0.2 MG/DL — SIGNIFICANT CHANGE UP (ref 0.2–1.2)
BILIRUB UR-MCNC: NEGATIVE — SIGNIFICANT CHANGE UP
BUN SERPL-MCNC: 24 MG/DL — HIGH (ref 10–20)
CALCIUM SERPL-MCNC: 9.2 MG/DL — SIGNIFICANT CHANGE UP (ref 8.4–10.5)
CHLORIDE SERPL-SCNC: 100 MMOL/L — SIGNIFICANT CHANGE UP (ref 98–110)
CO2 SERPL-SCNC: 28 MMOL/L — SIGNIFICANT CHANGE UP (ref 17–32)
COLOR SPEC: YELLOW — SIGNIFICANT CHANGE UP
CREAT SERPL-MCNC: 1.1 MG/DL — SIGNIFICANT CHANGE UP (ref 0.7–1.5)
DIFF PNL FLD: ABNORMAL
EGFR: 59 ML/MIN/1.73M2 — LOW
EOSINOPHIL # BLD AUTO: 0.02 K/UL — SIGNIFICANT CHANGE UP (ref 0–0.7)
EOSINOPHIL NFR BLD AUTO: 0.1 % — SIGNIFICANT CHANGE UP (ref 0–8)
GAS PNL BLDV: SIGNIFICANT CHANGE UP
GLUCOSE SERPL-MCNC: 176 MG/DL — HIGH (ref 70–99)
GLUCOSE UR QL: NEGATIVE MG/DL — SIGNIFICANT CHANGE UP
HCT VFR BLD CALC: 37.7 % — SIGNIFICANT CHANGE UP (ref 37–47)
HGB BLD-MCNC: 11.6 G/DL — LOW (ref 12–16)
IMM GRANULOCYTES NFR BLD AUTO: 0.4 % — HIGH (ref 0.1–0.3)
INR BLD: 1.02 RATIO — SIGNIFICANT CHANGE UP (ref 0.65–1.3)
KETONES UR-MCNC: ABNORMAL MG/DL
LACTATE SERPL-SCNC: 1.1 MMOL/L — SIGNIFICANT CHANGE UP (ref 0.7–2)
LACTATE SERPL-SCNC: 2.7 MMOL/L — HIGH (ref 0.7–2)
LACTATE SERPL-SCNC: 2.9 MMOL/L — HIGH (ref 0.7–2)
LEUKOCYTE ESTERASE UR-ACNC: ABNORMAL
LYMPHOCYTES # BLD AUTO: 1.17 K/UL — LOW (ref 1.2–3.4)
LYMPHOCYTES # BLD AUTO: 6.9 % — LOW (ref 20.5–51.1)
MCHC RBC-ENTMCNC: 25.6 PG — LOW (ref 27–31)
MCHC RBC-ENTMCNC: 30.8 G/DL — LOW (ref 32–37)
MCV RBC AUTO: 83.2 FL — SIGNIFICANT CHANGE UP (ref 81–99)
MONOCYTES # BLD AUTO: 0.46 K/UL — SIGNIFICANT CHANGE UP (ref 0.1–0.6)
MONOCYTES NFR BLD AUTO: 2.7 % — SIGNIFICANT CHANGE UP (ref 1.7–9.3)
MRSA PCR RESULT.: NEGATIVE — SIGNIFICANT CHANGE UP
NEUTROPHILS # BLD AUTO: 15.2 K/UL — HIGH (ref 1.4–6.5)
NEUTROPHILS NFR BLD AUTO: 89.5 % — HIGH (ref 42.2–75.2)
NITRITE UR-MCNC: NEGATIVE — SIGNIFICANT CHANGE UP
NRBC # BLD: 0 /100 WBCS — SIGNIFICANT CHANGE UP (ref 0–0)
PH UR: 6 — SIGNIFICANT CHANGE UP (ref 5–8)
PLATELET # BLD AUTO: 391 K/UL — SIGNIFICANT CHANGE UP (ref 130–400)
PMV BLD: 10.3 FL — SIGNIFICANT CHANGE UP (ref 7.4–10.4)
POTASSIUM SERPL-MCNC: 5.2 MMOL/L — HIGH (ref 3.5–5)
POTASSIUM SERPL-SCNC: 5.2 MMOL/L — HIGH (ref 3.5–5)
PROT SERPL-MCNC: 6.7 G/DL — SIGNIFICANT CHANGE UP (ref 6–8)
PROT UR-MCNC: 100 MG/DL
PROTHROM AB SERPL-ACNC: 11.6 SEC — SIGNIFICANT CHANGE UP (ref 9.95–12.87)
RAPID RVP RESULT: SIGNIFICANT CHANGE UP
RBC # BLD: 4.53 M/UL — SIGNIFICANT CHANGE UP (ref 4.2–5.4)
RBC # FLD: 15.4 % — HIGH (ref 11.5–14.5)
SARS-COV-2 RNA SPEC QL NAA+PROBE: SIGNIFICANT CHANGE UP
SODIUM SERPL-SCNC: 141 MMOL/L — SIGNIFICANT CHANGE UP (ref 135–146)
SP GR SPEC: 1.02 — SIGNIFICANT CHANGE UP (ref 1–1.03)
TROPONIN T SERPL-MCNC: <0.01 NG/ML — SIGNIFICANT CHANGE UP
TROPONIN T SERPL-MCNC: <0.01 NG/ML — SIGNIFICANT CHANGE UP
UROBILINOGEN FLD QL: 0.2 MG/DL — SIGNIFICANT CHANGE UP (ref 0.2–1)
WBC # BLD: 16.98 K/UL — HIGH (ref 4.8–10.8)
WBC # FLD AUTO: 16.98 K/UL — HIGH (ref 4.8–10.8)

## 2023-08-04 PROCEDURE — 86900 BLOOD TYPING SEROLOGIC ABO: CPT

## 2023-08-04 PROCEDURE — 84100 ASSAY OF PHOSPHORUS: CPT

## 2023-08-04 PROCEDURE — 36415 COLL VENOUS BLD VENIPUNCTURE: CPT

## 2023-08-04 PROCEDURE — 71260 CT THORAX DX C+: CPT | Mod: 26,MA

## 2023-08-04 PROCEDURE — 85379 FIBRIN DEGRADATION QUANT: CPT

## 2023-08-04 PROCEDURE — 85045 AUTOMATED RETICULOCYTE COUNT: CPT

## 2023-08-04 PROCEDURE — 74177 CT ABD & PELVIS W/CONTRAST: CPT | Mod: 26,MA

## 2023-08-04 PROCEDURE — 83550 IRON BINDING TEST: CPT

## 2023-08-04 PROCEDURE — 83605 ASSAY OF LACTIC ACID: CPT

## 2023-08-04 PROCEDURE — 87641 MR-STAPH DNA AMP PROBE: CPT

## 2023-08-04 PROCEDURE — 93306 TTE W/DOPPLER COMPLETE: CPT

## 2023-08-04 PROCEDURE — 83735 ASSAY OF MAGNESIUM: CPT

## 2023-08-04 PROCEDURE — 82728 ASSAY OF FERRITIN: CPT

## 2023-08-04 PROCEDURE — 99291 CRITICAL CARE FIRST HOUR: CPT

## 2023-08-04 PROCEDURE — 76770 US EXAM ABDO BACK WALL COMP: CPT | Mod: 26

## 2023-08-04 PROCEDURE — 92526 ORAL FUNCTION THERAPY: CPT | Mod: GN

## 2023-08-04 PROCEDURE — 84145 PROCALCITONIN (PCT): CPT

## 2023-08-04 PROCEDURE — 92610 EVALUATE SWALLOWING FUNCTION: CPT | Mod: GN

## 2023-08-04 PROCEDURE — 85027 COMPLETE CBC AUTOMATED: CPT

## 2023-08-04 PROCEDURE — 83540 ASSAY OF IRON: CPT

## 2023-08-04 PROCEDURE — 84484 ASSAY OF TROPONIN QUANT: CPT

## 2023-08-04 PROCEDURE — 80053 COMPREHEN METABOLIC PANEL: CPT

## 2023-08-04 PROCEDURE — C9113: CPT

## 2023-08-04 PROCEDURE — 85025 COMPLETE CBC W/AUTO DIFF WBC: CPT

## 2023-08-04 PROCEDURE — 71045 X-RAY EXAM CHEST 1 VIEW: CPT | Mod: 26

## 2023-08-04 PROCEDURE — 76770 US EXAM ABDO BACK WALL COMP: CPT

## 2023-08-04 PROCEDURE — 84466 ASSAY OF TRANSFERRIN: CPT

## 2023-08-04 PROCEDURE — 86901 BLOOD TYPING SEROLOGIC RH(D): CPT

## 2023-08-04 PROCEDURE — 94760 N-INVAS EAR/PLS OXIMETRY 1: CPT

## 2023-08-04 PROCEDURE — 93005 ELECTROCARDIOGRAM TRACING: CPT

## 2023-08-04 PROCEDURE — 99222 1ST HOSP IP/OBS MODERATE 55: CPT

## 2023-08-04 PROCEDURE — 87640 STAPH A DNA AMP PROBE: CPT

## 2023-08-04 PROCEDURE — 84443 ASSAY THYROID STIM HORMONE: CPT

## 2023-08-04 PROCEDURE — 86850 RBC ANTIBODY SCREEN: CPT

## 2023-08-04 RX ORDER — MIDODRINE HYDROCHLORIDE 2.5 MG/1
10 TABLET ORAL EVERY 8 HOURS
Refills: 0 | Status: DISCONTINUED | OUTPATIENT
Start: 2023-08-04 | End: 2023-08-11

## 2023-08-04 RX ORDER — ACETAMINOPHEN 500 MG
975 TABLET ORAL ONCE
Refills: 0 | Status: COMPLETED | OUTPATIENT
Start: 2023-08-04 | End: 2023-08-04

## 2023-08-04 RX ORDER — SODIUM CHLORIDE 9 MG/ML
1000 INJECTION, SOLUTION INTRAVENOUS ONCE
Refills: 0 | Status: COMPLETED | OUTPATIENT
Start: 2023-08-04 | End: 2023-08-04

## 2023-08-04 RX ORDER — PANTOPRAZOLE SODIUM 20 MG/1
40 TABLET, DELAYED RELEASE ORAL EVERY 12 HOURS
Refills: 0 | Status: DISCONTINUED | OUTPATIENT
Start: 2023-08-04 | End: 2023-08-07

## 2023-08-04 RX ORDER — GABAPENTIN 400 MG/1
600 CAPSULE ORAL DAILY
Refills: 0 | Status: DISCONTINUED | OUTPATIENT
Start: 2023-08-04 | End: 2023-08-11

## 2023-08-04 RX ORDER — SODIUM CHLORIDE 9 MG/ML
500 INJECTION, SOLUTION INTRAVENOUS ONCE
Refills: 0 | Status: COMPLETED | OUTPATIENT
Start: 2023-08-04 | End: 2023-08-04

## 2023-08-04 RX ORDER — CHOLECALCIFEROL (VITAMIN D3) 125 MCG
1 CAPSULE ORAL
Qty: 0 | Refills: 0 | DISCHARGE

## 2023-08-04 RX ORDER — AZTREONAM 2 G
VIAL (EA) INJECTION
Refills: 0 | Status: DISCONTINUED | OUTPATIENT
Start: 2023-08-04 | End: 2023-08-04

## 2023-08-04 RX ORDER — CEFEPIME 1 G/1
2000 INJECTION, POWDER, FOR SOLUTION INTRAMUSCULAR; INTRAVENOUS EVERY 8 HOURS
Refills: 0 | Status: COMPLETED | OUTPATIENT
Start: 2023-08-04 | End: 2023-08-04

## 2023-08-04 RX ORDER — VANCOMYCIN HCL 1 G
750 VIAL (EA) INTRAVENOUS ONCE
Refills: 0 | Status: COMPLETED | OUTPATIENT
Start: 2023-08-04 | End: 2023-08-04

## 2023-08-04 RX ORDER — RALOXIFENE HYDROCHLORIDE 60 MG/1
60 TABLET, COATED ORAL DAILY
Refills: 0 | Status: DISCONTINUED | OUTPATIENT
Start: 2023-08-04 | End: 2023-08-11

## 2023-08-04 RX ORDER — MIDODRINE HYDROCHLORIDE 2.5 MG/1
10 TABLET ORAL EVERY 8 HOURS
Refills: 0 | Status: DISCONTINUED | OUTPATIENT
Start: 2023-08-04 | End: 2023-08-04

## 2023-08-04 RX ORDER — NOREPINEPHRINE BITARTRATE/D5W 8 MG/250ML
0.05 PLASTIC BAG, INJECTION (ML) INTRAVENOUS
Qty: 8 | Refills: 0 | Status: DISCONTINUED | OUTPATIENT
Start: 2023-08-04 | End: 2023-08-05

## 2023-08-04 RX ORDER — FAMOTIDINE 10 MG/ML
20 INJECTION INTRAVENOUS ONCE
Refills: 0 | Status: COMPLETED | OUTPATIENT
Start: 2023-08-04 | End: 2023-08-04

## 2023-08-04 RX ORDER — GABAPENTIN 400 MG/1
1 CAPSULE ORAL
Qty: 0 | Refills: 0 | DISCHARGE

## 2023-08-04 RX ORDER — VANCOMYCIN HCL 1 G
750 VIAL (EA) INTRAVENOUS EVERY 12 HOURS
Refills: 0 | Status: DISCONTINUED | OUTPATIENT
Start: 2023-08-04 | End: 2023-08-04

## 2023-08-04 RX ORDER — CEFEPIME 1 G/1
INJECTION, POWDER, FOR SOLUTION INTRAMUSCULAR; INTRAVENOUS
Refills: 0 | Status: COMPLETED | OUTPATIENT
Start: 2023-08-04 | End: 2023-08-04

## 2023-08-04 RX ORDER — POLYETHYLENE GLYCOL 3350 17 G/17G
17 POWDER, FOR SOLUTION ORAL DAILY
Refills: 0 | Status: DISCONTINUED | OUTPATIENT
Start: 2023-08-04 | End: 2023-08-11

## 2023-08-04 RX ORDER — SODIUM CHLORIDE 9 MG/ML
250 INJECTION INTRAMUSCULAR; INTRAVENOUS; SUBCUTANEOUS ONCE
Refills: 0 | Status: COMPLETED | OUTPATIENT
Start: 2023-08-04 | End: 2023-08-04

## 2023-08-04 RX ORDER — CEFEPIME 1 G/1
2000 INJECTION, POWDER, FOR SOLUTION INTRAMUSCULAR; INTRAVENOUS ONCE
Refills: 0 | Status: COMPLETED | OUTPATIENT
Start: 2023-08-04 | End: 2023-08-04

## 2023-08-04 RX ORDER — SENNA PLUS 8.6 MG/1
2 TABLET ORAL AT BEDTIME
Refills: 0 | Status: DISCONTINUED | OUTPATIENT
Start: 2023-08-04 | End: 2023-08-11

## 2023-08-04 RX ORDER — SODIUM CHLORIDE 9 MG/ML
250 INJECTION, SOLUTION INTRAVENOUS ONCE
Refills: 0 | Status: COMPLETED | OUTPATIENT
Start: 2023-08-04 | End: 2023-08-04

## 2023-08-04 RX ORDER — IRON POLYSACCHARIDE COMPLEX 150 MG
1 CAPSULE ORAL
Qty: 0 | Refills: 0 | DISCHARGE

## 2023-08-04 RX ORDER — VANCOMYCIN HCL 1 G
VIAL (EA) INTRAVENOUS
Refills: 0 | Status: DISCONTINUED | OUTPATIENT
Start: 2023-08-04 | End: 2023-08-04

## 2023-08-04 RX ORDER — TIZANIDINE 4 MG/1
1 TABLET ORAL
Qty: 0 | Refills: 0 | DISCHARGE

## 2023-08-04 RX ORDER — LANOLIN ALCOHOL/MO/W.PET/CERES
3 CREAM (GRAM) TOPICAL AT BEDTIME
Refills: 0 | Status: DISCONTINUED | OUTPATIENT
Start: 2023-08-04 | End: 2023-08-11

## 2023-08-04 RX ADMIN — Medication 4.68 MICROGRAM(S)/KG/MIN: at 05:00

## 2023-08-04 RX ADMIN — Medication 1 TABLET(S): at 11:45

## 2023-08-04 RX ADMIN — Medication 3 MILLIGRAM(S): at 21:01

## 2023-08-04 RX ADMIN — Medication 975 MILLIGRAM(S): at 00:52

## 2023-08-04 RX ADMIN — SODIUM CHLORIDE 250 MILLILITER(S): 9 INJECTION, SOLUTION INTRAVENOUS at 15:11

## 2023-08-04 RX ADMIN — CEFEPIME 100 MILLIGRAM(S): 1 INJECTION, POWDER, FOR SOLUTION INTRAMUSCULAR; INTRAVENOUS at 01:00

## 2023-08-04 RX ADMIN — MIDODRINE HYDROCHLORIDE 10 MILLIGRAM(S): 2.5 TABLET ORAL at 21:01

## 2023-08-04 RX ADMIN — SENNA PLUS 2 TABLET(S): 8.6 TABLET ORAL at 21:01

## 2023-08-04 RX ADMIN — POLYETHYLENE GLYCOL 3350 17 GRAM(S): 17 POWDER, FOR SOLUTION ORAL at 11:45

## 2023-08-04 RX ADMIN — MIDODRINE HYDROCHLORIDE 10 MILLIGRAM(S): 2.5 TABLET ORAL at 11:44

## 2023-08-04 RX ADMIN — SODIUM CHLORIDE 1 MILLILITER(S): 9 INJECTION INTRAMUSCULAR; INTRAVENOUS; SUBCUTANEOUS at 23:15

## 2023-08-04 RX ADMIN — SODIUM CHLORIDE 1000 MILLILITER(S): 9 INJECTION, SOLUTION INTRAVENOUS at 01:30

## 2023-08-04 RX ADMIN — PANTOPRAZOLE SODIUM 40 MILLIGRAM(S): 20 TABLET, DELAYED RELEASE ORAL at 21:01

## 2023-08-04 RX ADMIN — FAMOTIDINE 20 MILLIGRAM(S): 10 INJECTION INTRAVENOUS at 04:19

## 2023-08-04 RX ADMIN — SODIUM CHLORIDE 500 MILLILITER(S): 9 INJECTION, SOLUTION INTRAVENOUS at 23:43

## 2023-08-04 RX ADMIN — PANTOPRAZOLE SODIUM 40 MILLIGRAM(S): 20 TABLET, DELAYED RELEASE ORAL at 10:27

## 2023-08-04 RX ADMIN — CEFEPIME 100 MILLIGRAM(S): 1 INJECTION, POWDER, FOR SOLUTION INTRAMUSCULAR; INTRAVENOUS at 14:06

## 2023-08-04 RX ADMIN — SODIUM CHLORIDE 1000 MILLILITER(S): 9 INJECTION, SOLUTION INTRAVENOUS at 11:18

## 2023-08-04 RX ADMIN — RALOXIFENE HYDROCHLORIDE 60 MILLIGRAM(S): 60 TABLET, COATED ORAL at 11:44

## 2023-08-04 RX ADMIN — GABAPENTIN 600 MILLIGRAM(S): 400 CAPSULE ORAL at 11:45

## 2023-08-04 RX ADMIN — Medication 250 MILLIGRAM(S): at 02:00

## 2023-08-04 RX ADMIN — SODIUM CHLORIDE 1000 MILLILITER(S): 9 INJECTION, SOLUTION INTRAVENOUS at 10:07

## 2023-08-04 NOTE — H&P ADULT - NSHPLABSRESULTS_GEN_ALL_CORE
.  LABS:                         11.6   16.98 )-----------( 391      ( 04 Aug 2023 01:05 )             37.7         141  |  100  |  24<H>  ----------------------------<  176<H>  5.2<H>   |  28  |  1.1    Ca    9.2      04 Aug 2023 01:05    TPro  6.7  /  Alb  3.6  /  TBili  <0.2  /  DBili  x   /  AST  53<H>  /  ALT  19  /  AlkPhos  90      PT/INR - ( 04 Aug 2023 01:05 )   PT: 11.60 sec;   INR: 1.02 ratio         PTT - ( 04 Aug 2023 01:05 )  PTT:26.3 sec  Urinalysis Basic - ( 04 Aug 2023 02:08 )    Color: Yellow / Appearance: Turbid / S.023 / pH: x  Gluc: x / Ketone: Trace mg/dL  / Bili: Negative / Urobili: 0.2 mg/dL   Blood: x / Protein: 100 mg/dL / Nitrite: Negative   Leuk Esterase: Large / RBC: 12 /HPF / WBC >998 /HPF   Sq Epi: x / Non Sq Epi: 4 /HPF / Bacteria: Many /HPF        Lactate, Blood: 2.7 mmol/L ( @ 04:50)  Lactate, Blood: 2.9 mmol/L ( @ 01:05)      RADIOLOGY, EKG & ADDITIONAL TESTS: Reviewed.

## 2023-08-04 NOTE — ED PROVIDER NOTE - ATTENDING CONTRIBUTION TO CARE
58-year-old female with past medical history of Down syndrome, hypothyroidism, seizure disorder, cerebral palsy, congenital pulmonary stenosis presents to the emergency department from her group home for 1 episode of coffee-ground emesis.  EMS and patient to be hypoxic in the 70s and they placed her on supplemental oxygen.  According to the group home staff patient is at her baseline.    Const: NAD  Eyes: PERRL, no conjunctival injection  HENT:  Neck supple without meningismus   CV: RRR, Warm, well-perfused extremities  RESP: CTA B/L, no tachypnea   GI: soft, non-tender, non-distended  Skin: Warm, dry. No rashes  Neuro: Alert moving all extremities       Patient found to have rectal temp of 102 we will do full septic work-up with labs antibiotics UA chest x-ray

## 2023-08-04 NOTE — PATIENT PROFILE ADULT - NSPROGENSOURCEINFO_GEN_A_NUR
Unable to obtain information. Patient is nonverbal at baseline and has history of Down Syndrome./lacks capacity and has no surrogate decision maker

## 2023-08-04 NOTE — ED PROVIDER NOTE - CLINICAL SUMMARY MEDICAL DECISION MAKING FREE TEXT BOX
Patient endorsed to me by Dr. Velasco pending imaging.   58-year-old female past medical history of Down syndrome, nonverbal at baseline, hypothyroidism, seizure disorder, cerebral palsy, congenital pulmonary stenosis brought in by EMS from [facility] for 1 episode of coffee-ground emesis and hypoxia just prior to arrival- noted to be hypoxic and hypotensive during ED stay. labs and imaging ordered and reviewed- leukocytosis, concern for sepsis. iv abx initiated, ct imaging ordered and reviewed concerning for multilobar pneumonia, cystitis. appropriate medications given, icu and gi consulted. admitted to ICU- stable at the time of admission.

## 2023-08-04 NOTE — ED ADULT TRIAGE NOTE - CHIEF COMPLAINT QUOTE
Pt was brought from group home for vomiting coffee ground Pt was brought from group home for coffee ground vomiting x 1, SPO2 in triage - 70%

## 2023-08-04 NOTE — H&P ADULT - HISTORY OF PRESENT ILLNESS
59yo F w/a PMH of Down syndrome, nonverbal at baseline, hypothyroidism, seizure disorder, cerebral palsy, congenital pulmonary stenosis brought in by EMS from [facility] for 1 episode of coffee-ground emesis and hypoxia just prior to arrival.  Staff from [facility] report patient has had only 1 episode of vomiting but has otherwise been well.  On EMS arrival patient was noted to be satting in the 70%s  on room air but improved to 96% on 2 L nasal cannula.  No other reported recent cough, vomiting, fever, chills, rash.    UA grossly positive in the ED. Hb found to be stable. Hypotensive and started on norepinephrine.    In the ED,   Vitals - BP 75533, , RR 17, T 97.8, satting 70% on RA,    Labs -   Imaging    Admitted to the ICU for UGIB and Septic Shock 2/2 UTI  59yo F w/a PMH of Down syndrome, nonverbal at baseline, hypothyroidism, cerebral palsy, congenital pulmonary stenosis brought in by EMS from [facility] for 1 episode of coffee-ground emesis and hypoxia just prior to arrival.  Staff from [facility] report patient has had only 1 episode of vomiting but has otherwise been well.  On EMS arrival patient was noted to be satting in the 70%s  on room air but improved to 96% on 2 L nasal cannula.  No other reported recent cough, vomiting, fever, chills, rash.    In the ED,   Vitals - BP 67763, , RR 17, T 97.8, satting 70% on RA,    Labs - WBC 16.98, Hgb 11.6, , Na 141, K 5.2 (Hemolyzed), BUN 24, Cr 1.1, Trop <.01, UA +  Imaging -  CT Chest and CTAP   - Bilateral bronchial wall thickening with lower lobe airway narrowing and bilateral patchy airspace opacities with more consolidative change in the left hilar and lower lobe region. Findings may be infectious/inflammatory. Correlate for multifocal pneumonia.  - Mildly enlarged mediastinal lymph nodes, nonspecific, possibly reactive. Follow-up posttreatment is recommended to ensure resolution of pulmonary findings in lymphadenopathy as the possibility of underlying neoplasm is not excluded.  - Moderate size hiatal hernia and wall thickening of the mid to lower esophagus. Findings may reflect esophagitis. Nonemergent direct visualization can be obtained for further evaluation.  - Urinary bladder appears mildly wall thickening. Correlate with urinalysis to exclude cystitis.  - Hepatic steatosis.    UA grossly positive in the ED. Hb found to be stable. Hypotensive and started on norepinephrine.    Admitted to the ICU for UGIB and Septic Shock 2/2 UTI

## 2023-08-04 NOTE — PATIENT PROFILE ADULT - FALL HARM RISK - HARM RISK INTERVENTIONS

## 2023-08-04 NOTE — ED PROVIDER NOTE - PHYSICAL EXAMINATION
CONSTITUTIONAL: NAD, shivering  SKIN: Warm, dry  HEAD: NCAT  EYES: Clear conjunctiva   ENT: MMM, dried dark emesis at corners of mouth  NECK: Supple  CARD: RRR, S1, S2; no M/R/G  RESP: diminished breath sounds bilaterally  ABD: Soft, nontender. No rebound, rigidity, or guarding  EXT: Pulses palpable distally  NEURO: Grossly intact. Awake, alert, moving all extremities, no facial asymmetry.

## 2023-08-04 NOTE — ED PROVIDER NOTE - PROGRESS NOTE DETAILS
BP noted to be 70s/40s after 2L IVF, started on levo  Spoke with GI fellow, who agrees to follow pt after admission Dr. Knight: sign out to Dr. Martinez   pt pending CT reads and admission

## 2023-08-04 NOTE — PATIENT PROFILE ADULT - NSPRESCRALCFREQ_GEN_A_NUR
unable to assess. unable to obtain information. unable to assess. unable to obtain information./Never

## 2023-08-04 NOTE — CONSULT NOTE ADULT - SUBJECTIVE AND OBJECTIVE BOX
Gastroenterology Consultation:    Patient is a 58y old  Female who presents with a chief complaint of Coffee ground emesis and hypoxia (04 Aug 2023 12:34)        Admitted on: 08-04-23      HPI: 59yo F w/a PMH of Down syndrome, nonverbal at baseline, hypothyroidism, cerebral palsy, congenital pulmonary stenosis brought in by EMS from [facility] for 1 episode of coffee-ground emesis and hypoxia just prior to arrival.  Staff from [facility] report patient has had only 1 episode of vomiting but has otherwise been well.  On EMS arrival patient was noted to be satting in the 70%s  on room air but improved to 96% on 2 L nasal cannula.  No other reported recent cough, vomiting, fever, chills, rash.    In the ED,   V[place] - BP 76227, , RR 17, T 97.8, satting 70% on RA,    Labs - WBC 16.98, Hgb 11.6, , Na 141, K 5.2 (Hemolyzed), BUN 24, Cr 1.1, Trop <.01, UA +  UA grossly positive in the ED. Hb found to be stable. Hypotensive and started on norepinephrine.    GI was called for evaluation.         Prior EGD: none on chart     Prior Colonoscopy: none on chart       PAST MEDICAL & SURGICAL HISTORY:  Down syndrome      Osteoporosis      Mild anemia      Neuropathy      S/P debridement  of R hip on 3/2/21            FAMILY HISTORY:  No pertinent family history in first degree relatives        Social History:  Tobacco: reported negative   Alcohol: reported negative   Drugs: reported negative     Home Medications:  gabapentin 600 mg oral tablet: 1 orally once a day (04 Aug 2023 10:55)  melatonin 5 mg oral tablet: 1 tab(s) orally once a day (at bedtime) (04 Aug 2023 11:13)  midodrine 10 mg oral tablet: 1 tab(s) orally 3 times a day (04 Aug 2023 11:13)  Multiple Vitamins oral tablet: 1 tab(s) orally once a day (04 Aug 2023 11:13)  raloxifene 60 mg oral tablet: 1 tab(s) orally once a day (04 Aug 2023 11:13)        MEDICATIONS  (STANDING):  cefepime   IVPB      cefepime   IVPB 2000 milliGRAM(s) IV Intermittent every 8 hours  gabapentin 600 milliGRAM(s) Oral daily  melatonin 3 milliGRAM(s) Oral at bedtime  midodrine 10 milliGRAM(s) Oral every 8 hours  multivitamin 1 Tablet(s) Oral daily  norepinephrine Infusion 0.05 MICROgram(s)/kG/Min (4.68 mL/Hr) IV Continuous <Continuous>  pantoprazole  Injectable 40 milliGRAM(s) IV Push every 12 hours  polyethylene glycol 3350 17 Gram(s) Oral daily  raloxifene 60 milliGRAM(s) Oral daily  senna 2 Tablet(s) Oral at bedtime    MEDICATIONS  (PRN):      Allergies  No Known Allergies      Review of Systems:  limited as pt non verbal           Physical Examination:  T(C): 36.7 (08-04-23 @ 12:00), Max: 38.9 (08-04-23 @ 00:50)  HR: 114 (08-04-23 @ 12:30) (60 - 137)  BP: 116/58 (08-04-23 @ 12:30) (75/36 - 126/64)  RR: 31 (08-04-23 @ 12:30) (17 - 32)  SpO2: 96% (08-04-23 @ 12:30) (70% - 99%)  Height (cm): 142.2 (08-04-23 @ 09:15)  Weight (kg): 50.9 (08-04-23 @ 09:15)    08-04-23 @ 07:01  -  08-04-23 @ 14:02  --------------------------------------------------------  IN: 1021.9 mL / OUT: 0 mL / NET: 1021.9 mL          GENERAL: non verbal   HEAD:  Atraumatic, Normocephalic  EYES: conjunctiva and sclera clear  NECK: Supple, no JVD or thyromegaly  CHEST/LUNG: Clear to auscultation bilaterally; No wheeze, rhonchi, or rales  HEART: Regular rate and rhythm; normal S1, S2, No murmurs.  ABDOMEN: Soft, nontender, nondistended; Bowel sounds present  NEUROLOGY: No asterixis or tremor.   SKIN: Intact, no jaundice        Data:                        11.6   16.98 )-----------( 391      ( 04 Aug 2023 01:05 )             37.7     Hgb Trend:  11.6  08-04-23 @ 01:05        08-04    141  |  100  |  24<H>  ----------------------------<  176<H>  5.2<H>   |  28  |  1.1    Ca    9.2      04 Aug 2023 01:05    TPro  6.7  /  Alb  3.6  /  TBili  <0.2  /  DBili  x   /  AST  53<H>  /  ALT  19  /  AlkPhos  90  08-04    Liver panel trend:  TBili <0.2   /   AST 53   /   ALT 19   /   AlkP 90   /   Tptn 6.7   /   Alb 3.6    /   DBili --      08-04      PT/INR - ( 04 Aug 2023 01:05 )   PT: 11.60 sec;   INR: 1.02 ratio         PTT - ( 04 Aug 2023 01:05 )  PTT:26.3 sec        Radiology:  CT Abdomen and Pelvis w/ IV Cont:   ACC: [id number] EXAM:  CT ABDOMEN AND PELVIS IC   ORDERED BY: ISAURA GARCIA     ACC: [id number] EXAM:  CT CHEST IC   ORDERED BY: ISAURA GARCIA     PROCEDURE DATE:  08/04/2023          INTERPRETATION:  CLINICAL STATEMENT: Fever    TECHNIQUE: Contiguous CT images were obtained of the chest, abdomen and   pelvis.    Intravenous Contrast:  Intravenous contrast administered.  95 cc Omnipaque 350 intravenous contrast was administered. 5 cc discarded.  Oral contrast: was not administered.      COMPARISON: CT abdomen pelvis dated 3/24/2022    FINDINGS:    CHEST:    LUNGS, PLEURA AND AIRWAYS: There are bilateral patchy airspace opacities   with more consolidative change in the left hilar and lower lobe region.   There appears to be mild airway narrowing of the bilateral lower lobe   bronchi as well as diffuse bronchial wall thickening. No pleural effusion   or pneumothorax    HEART AND VESSELS: Heart size appears within normal limits. No   pericardial effusion is present. Normal caliber thoracic aorta. Normal   caliber main pulmonary artery.    THORACIC INLET/MEDIASTINUM/LYMPH NODES: The visualized thyroid gland   appears unremarkable. There are multiple mildly enlarged mediastinal   lymph nodes, for example a left paratracheal lymph node measuring 1.5 x   1.0 cm (6/82), and AP window lymph node measuring 1.7 x 1.0 cm (6/80) and   additional 1.5 x 0.9 cm lymph node (6/99). There is a moderate size   hiatal hernia and wall thickening of the mid to lower esophagus.    ABDOMEN/PELVIS:    Artifact from patient's bilateral arms adjacent to the body degraded   evaluation.    LIVER: Hepatic steatosis.    SPLEEN: Unremarkable.    PANCREAS: Unremarkable.    GALLBLADDER AND BILIARY TREE: Cholelithiasis versus gallbladder wall   calcifications.    ADRENALS: Unremarkable.    KIDNEYS: Symmetric pattern of renal enhancement. No hydronephrosis.    LYMPH NODES: There are no enlarged abdominal or pelvic lymph nodes.    VASCULATURE: The abdominal aorta is normal in caliber.    BOWEL: No evidence for bowel obstruction or bowel wall thickening.   Unremarkable appendix.    PERITONEUM/RETROPERITONEUM/MESENTERY: There is no ascites or   pneumoperitoneum.    PELVIC VISCERA: Urinary bladder appears mildly wall thickened.    BONES AND SOFT TISSUES: Degenerative changes of the spine. Grade 2   anterolisthesis of L5 on S1.      IMPRESSION:    Bilateral bronchial wall thickening with lower lobe airway narrowing and   bilateral patchy airspace opacities with more consolidative change in the   left hilar and lower lobe region. Findings may be   infectious/inflammatory. Correlate for multifocal pneumonia.    Mildly enlarged mediastinal lymph nodes, nonspecific, possibly reactive.   Follow-up posttreatment is recommended to ensure resolution of pulmonary   findings in lymphadenopathy as the possibility of underlying neoplasm is   not excluded.    Moderate size hiatal hernia and wall thickening of the mid to lower   esophagus. Findings may reflect esophagitis. Nonemergent direct   visualization can be obtained for further evaluation.    Urinary bladder appears mildly wall thickening. Correlate with urinalysis   to exclude cystitis.    Hepatic steatosis.    --- End of Report ---            DEYA LIU MD; Attending Radiologist  This document has been electronically signed. Aug  4 2023  6:37AM (08-04-23 @ 03:49)

## 2023-08-04 NOTE — PROGRESS NOTE ADULT - SUBJECTIVE AND OBJECTIVE BOX
TEA ECHAVARRIA 58y Female  MRN#: [id number]   Hospital Day:     HPI:  57yo F w/a PMH of Down syndrome, nonverbal at baseline, hypothyroidism, cerebral palsy, congenital pulmonary stenosis brought in by EMS from ([place] [facility] for 1 episode of coffee-ground emesis and hypoxia just prior to arrival.  Staff from [facility] report patient has had only 1 episode of vomiting but has otherwise been well.  On EMS arrival patient was noted to be satting in the 70%s  on room air but improved to 96% on 2 L nasal cannula.  No other reported recent cough, vomiting, fever, chills, rash.  was admitted for foot abscess excision and severe repeated vomiting in , decubitis ulcer in   In the ED,   V[place] - BP 87474, , RR 17, T 97.8, satting 70% on RA,    Labs - WBC 16.98, Hgb 11.6, , Na 141, K 5.2 (Hemolyzed), BUN 24, Cr 1.1, Trop <.01, UA +  Imaging -  CT Chest and CTAP   - Bilateral bronchial wall thickening with lower lobe airway narrowing and bilateral patchy airspace opacities with more consolidative change in the left hilar and lower lobe region. Findings may be infectious/inflammatory. Correlate for multifocal pneumonia.  - Mildly enlarged mediastinal lymph nodes, nonspecific, possibly reactive. Follow-up posttreatment is recommended to ensure resolution of pulmonary findings in lymphadenopathy as the possibility of underlying neoplasm is not excluded.  - Moderate size hiatal hernia and wall thickening of the mid to lower esophagus. Findings may reflect esophagitis. Nonemergent direct visualization can be obtained for further evaluation.  - Urinary bladder appears mildly wall thickening. Correlate with urinalysis to exclude cystitis.  - Hepatic steatosis.    UA grossly positive in the ED. Hb found to be stable. Hypotensive and started on norepinephrine.    Admitted to the ICU for UGIB and Septic Shock 2/2 UTI  (04 Aug 2023 10:16)        OBJECTIVE  PAST MEDICAL & SURGICAL HISTORY  Down syndrome    Osteoporosis    Mild anemia    Neuropathy    S/P debridement  of R hip on 3/2/21      ALLERGIES:  No Known Allergies    MEDICATIONS:  STANDING MEDICATIONS  cefepime   IVPB      cefepime   IVPB 2000 milliGRAM(s) IV Intermittent every 8 hours  gabapentin 600 milliGRAM(s) Oral daily  melatonin 3 milliGRAM(s) Oral at bedtime  midodrine 10 milliGRAM(s) Oral every 8 hours  multivitamin 1 Tablet(s) Oral daily  norepinephrine Infusion 0.05 MICROgram(s)/kG/Min IV Continuous <Continuous>  pantoprazole  Injectable 40 milliGRAM(s) IV Push every 12 hours  polyethylene glycol 3350 17 Gram(s) Oral daily  raloxifene 60 milliGRAM(s) Oral daily  senna 2 Tablet(s) Oral at bedtime    PRN MEDICATIONS      VITAL SIGNS: Last 24 Hours  T(C): 36.7 (04 Aug 2023 12:00), Max: 38.9 (04 Aug 2023 00:50)  T(F): 98 (04 Aug 2023 12:00), Max: 102 (04 Aug 2023 00:50)  HR: 113 (04 Aug 2023 11:45) (60 - 137)  BP: 105/51 (04 Aug 2023 11:45) (75/36 - 126/64)  BP(mean): 73 (04 Aug 2023 11:45) (65 - 89)  RR: 27 (04 Aug 2023 11:45) (17 - 27)  SpO2: 95% (04 Aug 2023 11:45) (70% - 99%)    LABS:                        11.6   16.98 )-----------( 391      ( 04 Aug 2023 01:05 )             37.7     08-04    141  |  100  |  24<H>  ----------------------------<  176<H>  5.2<H>   |  28  |  1.1    Ca    9.2      04 Aug 2023 01:05    TPro  6.7  /  Alb  3.6  /  TBili  <0.2  /  DBili  x   /  AST  53<H>  /  ALT  19  /  AlkPhos  90  08-04    PT/INR - ( 04 Aug 2023 01:05 )   PT: 11.60 sec;   INR: 1.02 ratio         PTT - ( 04 Aug 2023 01:05 )  PTT:26.3 sec  Urinalysis Basic - ( 04 Aug 2023 02:08 )    Color: Yellow / Appearance: Turbid / S.023 / pH: x  Gluc: x / Ketone: Trace mg/dL  / Bili: Negative / Urobili: 0.2 mg/dL   Blood: x / Protein: 100 mg/dL / Nitrite: Negative   Leuk Esterase: Large / RBC: 12 /HPF / WBC >998 /HPF   Sq Epi: x / Non Sq Epi: 4 /HPF / Bacteria: Many /HPF        Lactate, Blood: 2.7 mmol/L *H* (23 @ 04:50)  Lactate, Blood: 2.9 mmol/L *H* (23 @ 01:05)  Troponin T, Serum: <0.01 ng/mL (23 @ 01:05)      CARDIAC MARKERS ( 04 Aug 2023 01:05 )  x     / <0.01 ng/mL / x     / x     / x              PHYSICAL EXAM:  GENERAL: Down syndrome features, aphasic.  HEAD:  Atraumatic, hypocephalic.  EYES: conjunctiva and sclera clear  CHEST/LUNG: GBAE.  HEART: regular rate and rhythm; S1/S2 with sysoltic ejection murmur heard at base P2 non radiating  ABDOMEN: Soft, Nontender, Nondistended  EXTREMITIES: No edema. scar of excised abscess/ osteomyelitis  PSYCH: AAOx3.  NEUROLOGY: non-focal; moves all extremities

## 2023-08-04 NOTE — SWALLOW BEDSIDE ASSESSMENT ADULT - SLP GENERAL OBSERVATIONS
Pt received in bed awake alert in no apparent pain +GH aide at bedside +room air Pt received in bed awake alert in no apparent pain +GH aide at bedside +room air +non-verbal

## 2023-08-04 NOTE — SWALLOW BEDSIDE ASSESSMENT ADULT - COMMENTS
Pt known to SLP dept. from 4/2022 w/ MBSS recs for puree, thin liquids. Pt known to SLP dept. from 4/2022 w/ MBSS recs for puree, thin liquids. No laryngeal penetration/aspiration during study. Pt known to SLP dept. from 4/2022 w/ VFSS recs for puree, thin liquids. No laryngeal penetration/aspiration during study.

## 2023-08-04 NOTE — CONSULT NOTE ADULT - ASSESSMENT
57yo F w/a PMH of Down syndrome, nonverbal at baseline, hypothyroidism, cerebral palsy, congenital pulmonary stenosis brought in by EMS from retirement for 1 episode of coffee-ground emesis and hypoxia just prior to arrival. GI is called for evaluation.    # Reported coffee ground emesis:  - patient was on levo on admission, currently hemodynamically stable   - no vomiting in the hospital   - brown stool today   - H/H stable   - CT scan A/P IV cont: - Moderate size hiatal hernia and wall thickening of the mid to lower esophagus. Findings may reflect esophagitis.     recommendations:  - start clear liquid diet  - PPI BID  - monitor H/H with active type and screen   - Avoid NSAIDs    # Hepatic steatosis:  - elevated AST 53 on admission but hemolyzed sample  - can repeat LFTs  - follow up outpt  59yo F w/a PMH of Down syndrome, nonverbal at baseline, hypothyroidism, cerebral palsy, congenital pulmonary stenosis brought in by EMS from long-term for 1 episode of coffee-ground emesis and hypoxia just prior to arrival. GI is called for evaluation.    # Reported coffee ground emesis: DDx stress gastritis vs others:  - patient was on levo on admission, currently hemodynamically stable   - no vomiting in the hospital   - brown stool today   - H/H stable   - CT scan A/P IV cont: - Moderate size hiatal hernia and wall thickening of the mid to lower esophagus. Findings may reflect esophagitis.     recommendations:  - start clear liquid diet and advance slowly as tolerated   - PPI BID IV while on pressors, can switch to PO once off pressors  - monitor H/H with active type and screen   - Avoid NSAIDs  - will benefit from EGD that can be done as outpatient  - Follow up with our [facility] located at [address] [place] [address]. Phone Number: [phone]    - if any signs of GI bleeding or further drop of Hb recall GI     # Hepatic steatosis:  - elevated AST 53 on admission but hemolyzed sample  - can repeat LFTs  - follow up outpt    recall PRN

## 2023-08-04 NOTE — CONSULT NOTE ADULT - ASSESSMENT
IMPRESSION:    Septic shock  Sepsis POA  UTI  Upper GI bleed  HO Down syndrome, nonverbal at baseline  HO Hypothyroidism  HO seizure disorder, cerebral palsy  HO congenital pulmonary stenosis    PLAN:    CNS: Avoid sedatives. Resume home antiepiletics and obtain serum levels.    HEENT: Oral care    PULMONARY: HOB @ 45 degrees. Aspiration precautions    CARDIOVASCULAR: Goal directed fluid resuscitation. Wean pressors. Target MAP 60-65. Avoid volume overload. Cardiac enzymes x2. Echo.     GI: GI prophylaxis. NPO. Bowel regimen. Protonix IV BID.    RENAL: Follow up renal function and lytes. Correct as needed.    INFECTIOUS DISEASE: Monitor vital signs. Follow up cultures. MRSA nares. Procalcitonin.    HEMATOLOGICAL: SCDs for DVT prophylaxis. Monitor CBC and Coags. Transfuse to keep Hb >7. D-dimer    ENDOCRINE: Follow up fingersticks. Insulin protocol if needed. TSH level.    MUSCULOSKELETAL: Bedrest    DISPO: Patient requires MICU monitoring at this time  IMPRESSION:    Septic shock  Sepsis POA  UTI  Upper GI bleed, [place] 2  HO Down syndrome, nonverbal at baseline  HO Hypothyroidism  HO seizure disorder, not on AEDs  HO cerebral palsy  HO congenital pulmonary stenosis    PLAN:    CNS: Avoid sedatives.     HEENT: Oral care    PULMONARY: HOB @ 45 degrees. Aspiration precautions    CARDIOVASCULAR: Goal directed fluid resuscitation. Wean pressors. Target MAP 60-65. Avoid volume overload. Cardiac enzymes x2. Echo.     GI: GI prophylaxis. NPO. Bowel regimen. Protonix IV BID.    RENAL: Follow up renal function and lytes. Correct as needed.    INFECTIOUS DISEASE: Monitor vital signs. Follow up cultures. MRSA nares. Procalcitonin. Cefepime.    HEMATOLOGICAL: SCDs for DVT prophylaxis. Monitor CBC and Coags. Transfuse to keep Hb >7. D-dimer    ENDOCRINE: Follow up fingersticks. Insulin protocol if needed. TSH level.    MUSCULOSKELETAL: Bedrest    DISPO: Patient requires MICU monitoring at this time  IMPRESSION:    Septic shock  Sepsis POA  UTI  Possible aspiration   Suspected UGIB  HO Down syndrome, nonverbal at baseline  HO Hypothyroidism  HO seizure disorder, not on AEDs  HO cerebral palsy  HO congenital pulmonary stenosis    PLAN:    CNS: Avoid sedatives.     HEENT: Oral care.  SPeech and swallow     PULMONARY: HOB @ 45 degrees. Aspiration precautions.  Wean O2 as tolerated.      CARDIOVASCULAR: Goal directed fluid resuscitation. Wean pressors. Target MAP 60-65. Avoid volume overload. Cardiac enzymes x2. Echo.     GI: GI prophylaxis. NPO. Bowel regimen. Protonix IV BID.  GI evaluation     RENAL: Follow up renal function and lytes. Correct as needed.    INFECTIOUS DISEASE: Monitor vital signs. Follow up cultures. MRSA nares. Procalcitonin. Cefepime for now     HEMATOLOGICAL: SCDs for DVT prophylaxis. Monitor CBC and Coags. Transfuse to keep Hb >7. D-dimer    ENDOCRINE: Follow up fingersticks. Insulin protocol if needed. TSH level.    MUSCULOSKELETAL: Bedrest    DISPO: Patient requires MICU monitoring at this time

## 2023-08-04 NOTE — ED PROVIDER NOTE - NSCAREINITIATED _GEN_ER
November 27, 2019    Aurelia Worrell  [address] [place] [address]       [facility]                                                                                                                                                       [address] [facility].  [facility] [address]  Phone: [phone]  Fax: [phone] Dear Mrs. Worrell:    Dr Lau okay'd a refill today and noticed you are past  Due for your annual visit. Last seen august 2018.    Please schedule an annual appt to see him, don't be surprised  If next opening is january/february.    Sincerely,      Dr Brady Lau staff/lb      Over, Alisha(Resident)

## 2023-08-04 NOTE — H&P ADULT - VTE RISK ASSESSMENT
Patient is calling about her abscess. She was at the ER today. Notes from ER and from her PCP are on the chart from  3/25/20. Patient wants to be admitted into the clinic. Writer told her that this is an outpatient facility. She states that she wants to speak with the care team and have recommendation on where to go.     Please call patient a few times as her video phone does not always get calls coming in.     Please advise.   <<--- Click to launch

## 2023-08-04 NOTE — PROGRESS NOTE ADULT - ASSESSMENT
58 years old female referred for disability center with hematemsis and desaturation., known Down syn, hypothyroid, asphasic, recurrent infections/abscesses, osteoporosis, PS.     #Hematemsis (CTAP showed hiatal hernia, reactive LNs)  - Pantoprazole IV 40mg bid   -GI consulted    #Hypoxia (mostly due to aspiration pneumonia)  -nasal cannula, well sat-ed    #Infection (mostly due to aspiration pneumonia)  -Cefepime 2gm/8hrs  - Urine and blood cultures, MRSA and RVP sent and pending    #    #Down syndrome  #Congential P stenosis    #Osteoporosis  -Raloxifene     #Peripheral neuropathy  -Gapapentin    #Hypothyroidism  -Follow TSH 58 years old female referred for disability center with hematemsis and desaturation., known Down syn, hypothyroid, asphasic, recurrent infections/abscesses, osteoporosis, PS.     #Hematemsis (CTAP showed hiatal hernia, reactive LNs)  - Pantoprazole IV 40mg bid   -GI consulted    #Hypoxia (mostly due to aspiration pneumonia)  -nasal cannula, well sat-ed    #Infection (mostly due to UTI)  -Cefepime 2gm/8hrs  - Urine and blood cultures, MRSA and RVP sent and pending    #Down syndrome  #Congential P stenosis    #Osteoporosis  -Raloxifene     #Peripheral neuropathy  -Gapapentin    #Hypothyroidism  -Follow TSH

## 2023-08-04 NOTE — ED PROVIDER NOTE - OBJECTIVE STATEMENT
58-year-old female past medical history of Down syndrome, nonverbal at baseline, hypothyroidism, seizure disorder, cerebral palsy, congenital pulmonary stenosis brought in by EMS from [facility] for 1 episode of coffee-ground emesis and hypoxia just prior to arrival.  Staff from [facility] report patient has had only 1 episode of vomiting but has otherwise been well.  On EMS arrival patient was noted to be satting in the 70%s  on room air but improved to 96% on 2 L nasal cannula.  No other reported recent cough, vomiting, fever, chills, rash

## 2023-08-04 NOTE — H&P ADULT - ASSESSMENT
IMPRESSION:    Septic shock 2/2 UTI  Sepsis POA  Aspiration?   Suspected UGIB  HO Down syndrome, nonverbal at baseline  HO Hypothyroidism  HO cerebral palsy  HO congenital pulmonary stenosis    PLAN:    CNS: Avoid depressants.    HEENT: Oral care.  Speech eval     PULMONARY: HOB @ 45 degrees. Aspiration precautions.  Wean O2 as tolerated.      CARDIOVASCULAR: Goal directed fluid resuscitation. Off Levophed now. Midodrine 10q8. Target MAP 60-65. Avoid volume overload. Cardiac enzymes x2. Echo.     GI: GI prophylaxis. NPO until speech. Bowel regimen. Protonix IV BID.  GI evaluation     RENAL: Follow up renal function and lytes. Correct as needed.    INFECTIOUS DISEASE: Monitor vital signs. Follow up cultures. MRSA nares. Procalcitonin. Cefepime for now     HEMATOLOGICAL: SCDs for DVT prophylaxis. Monitor CBC and Coags. Transfuse to keep Hb >7. D-dimer    ENDOCRINE: Follow up fingersticks. Insulin protocol if needed. TSH level.    MUSCULOSKELETAL: Bedrest    MICU  IMPRESSION:    Septic shock 2/2 UTI  Sepsis POA  Aspiration?   Suspected UGIB  HO Down syndrome, nonverbal at baseline  HO Hypothyroidism  HO cerebral palsy  HO congenital pulmonary stenosis    PLAN:    CNS: Avoid depressants.    HEENT: Oral care.  Speech eval     PULMONARY: HOB @ 45 degrees. Aspiration precautions.  Wean O2 as tolerated.      CARDIOVASCULAR: Goal directed fluid resuscitation. Off Levophed now. Midodrine 10q8. Target MAP 60-65. Avoid volume overload. Cardiac enzymes x2. Echo.     GI: GI prophylaxis. NPO until speech. Bowel regimen. Protonix IV BID.  GI evaluation     RENAL: Follow up renal function and lytes. Correct as needed. Renal US     INFECTIOUS DISEASE: Monitor vital signs. Follow up cultures. MRSA nares. Procalcitonin. Cefepime for now     HEMATOLOGICAL: SCDs for DVT prophylaxis. Monitor CBC and Coags. Transfuse to keep Hb >7. D-dimer    ENDOCRINE: Follow up fingersticks. Insulin protocol if needed. TSH level.    MUSCULOSKELETAL: Bedrest    MICU

## 2023-08-04 NOTE — CONSULT NOTE ADULT - SUBJECTIVE AND OBJECTIVE BOX
Patient is a 58y old  Female who presents with a chief complaint of     HPI:      PAST MEDICAL & SURGICAL HISTORY:  Down syndrome      Osteoporosis      Mild anemia      Neuropathy      S/P debridement  of R hip on 3/2/21          Occupational hx:    Social hx:    FAMILY HISTORY:  No pertinent family history in first degree relatives    :  No known cardiovascular family history    ROS:  See HPI     Allergies    No Known Allergies    Intolerances          PHYSICAL EXAM    ICU Vital Signs Last 24 Hrs  T(C): 36.6 (04 Aug 2023 05:23), Max: 38.9 (04 Aug 2023 00:50)  T(F): 97.9 (04 Aug 2023 05:23), Max: 102 (04 Aug 2023 00:50)  HR: 60 (04 Aug 2023 07:08) (60 - 137)  BP: 87/43 (04 Aug 2023 07:08) (75/36 - 118/58)  BP(mean): 84 (04 Aug 2023 04:38) (84 - 84)  ABP: --  ABP(mean): --  RR: 18 (04 Aug 2023 04:38) (17 - 19)  SpO2: 98% (04 Aug 2023 04:38) (70% - 99%)    O2 Parameters below as of 04 Aug 2023 04:38  Patient On (Oxygen Delivery Method): nasal cannula  O2 Flow (L/min): 3      CONSTITUTIONAL:  Ill-appearing. Well nourished. In NAD    ENT:   Airway patent,     EYES:   Clear bilaterally,   pupils equal,   round and reactive to light.    CARDIAC:   Normal rate,   regular rhythm.      RESPIRATORY:   No wheezing  Normal chest expansion  Not tachypneic,  No use of accessory muscles    GASTROINTESTINAL:  Abdomen soft,   non-tender,   no guarding,   + BS    MUSCULOSKELETAL:   range of motion is not limited,  no clubbing, cyanosis    NEUROLOGICAL:   Non-verbal  Not following simple commands        LABS:                          11.6   16.98 )-----------( 391      ( 04 Aug 2023 01:05 )             37.7                                               08-04    141  |  100  |  24<H>  ----------------------------<  176<H>  5.2<H>   |  28  |  1.1    Ca    9.2      04 Aug 2023 01:05    TPro  6.7  /  Alb  3.6  /  TBili  <0.2  /  DBili  x   /  AST  53<H>  /  ALT  19  /  AlkPhos  90  08-04      PT/INR - ( 04 Aug 2023 01:05 )   PT: 11.60 sec;   INR: 1.02 ratio         PTT - ( 04 Aug 2023 01:05 )  PTT:26.3 sec                                       Urinalysis Basic - ( 04 Aug 2023 02:08 )    Color: Yellow / Appearance: Turbid / S.023 / pH: x  Gluc: x / Ketone: Trace mg/dL  / Bili: Negative / Urobili: 0.2 mg/dL   Blood: x / Protein: 100 mg/dL / Nitrite: Negative   Leuk Esterase: Large / RBC: 12 /HPF / WBC >998 /HPF   Sq Epi: x / Non Sq Epi: 4 /HPF / Bacteria: Many /HPF        CARDIAC MARKERS ( 04 Aug 2023 01:05 )  x     / <0.01 ng/mL / x     / x     / x                                                LIVER FUNCTIONS - ( 04 Aug 2023 01:05 )  Alb: 3.6 g/dL / Pro: 6.7 g/dL / ALK PHOS: 90 U/L / ALT: 19 U/L / AST: 53 U/L / GGT: x                                              CXR reviewed by myself: mild patchy infiltrates which appear chronic on 2023 when compared to 3/28/2022    MEDICATIONS  (STANDING):  cefepime   IVPB      cefepime   IVPB 2000 milliGRAM(s) IV Intermittent every 8 hours  norepinephrine Infusion 0.05 MICROgram(s)/kG/Min (4.68 mL/Hr) IV Continuous <Continuous>  vancomycin  IVPB 750 milliGRAM(s) IV Intermittent every 12 hours  vancomycin  IVPB        MEDICATIONS  (PRN):         Patient is a 58y old  Female who presents with a chief complaint of coffee-ground emesis.    HPI:  Patient is a 59yo female with PMH of Down syndrome, nonverbal at baseline, hypothyroidism, seizure disorder, cerebral palsy, congenital pulmonary stenosis brought in by EMS from [facility] for 1 episode of coffee-ground emesis and hypoxia just prior to arrival.  Staff from [facility] report patient has had only 1 episode of vomiting but has otherwise been well.  On EMS arrival patient was noted to be satting in the 70%s  on room air but improved to 96% on 2 L nasal cannula.  No other reported recent cough, vomiting, fever, chills, rash.    UA grossly positive in the ED. Hb found to be stable. Hypotensive and started on norepinephrine.      PAST MEDICAL & SURGICAL HISTORY:  Down syndrome  Osteoporosis  Mild anemia  Neuropathy      S/P debridement  of R hip on 3/2/21          Occupational hx:    Social hx: Unable to obtain    FAMILY HISTORY:  No pertinent family history in first degree relatives    :  No known cardiovascular family history    ROS:  See HPI     Allergies    No Known Allergies    Intolerances          PHYSICAL EXAM    ICU Vital Signs Last 24 Hrs  T(C): 36.6 (04 Aug 2023 05:23), Max: 38.9 (04 Aug 2023 00:50)  T(F): 97.9 (04 Aug 2023 05:23), Max: 102 (04 Aug 2023 00:50)  HR: 60 (04 Aug 2023 07:08) (60 - 137)  BP: 87/43 (04 Aug 2023 07:08) (75/36 - 118/58)  BP(mean): 84 (04 Aug 2023 04:38) (84 - 84)  RR: 18 (04 Aug 2023 04:38) (17 - 19)  SpO2: 98% (04 Aug 2023 04:38) (70% - 99%)    O2 Parameters below as of 04 Aug 2023 04:38  Patient On (Oxygen Delivery Method): nasal cannula  O2 Flow (L/min): 3      CONSTITUTIONAL:  Ill-appearing. Well nourished. In NAD    ENT:   Airway patent    EYES:   Clear bilaterally,   pupils equal,   round and reactive to light.    CARDIAC:   Tachycardic,   regular rhythm.      RESPIRATORY:   No wheezing  Normal chest expansion  Not tachypneic,  No use of accessory muscles    GASTROINTESTINAL:  Abdomen soft,   non-tender,   no guarding,   + BS    MUSCULOSKELETAL:   range of motion is not limited,  no clubbing, cyanosis    NEUROLOGICAL:   Non-verbal  Not following simple commands        LABS:                          11.6   16.98 )-----------( 391      ( 04 Aug 2023 01:05 )             37.7                                               08-    141  |  100  |  24<H>  ----------------------------<  176<H>  5.2<H>   |  28  |  1.1    Ca    9.2      04 Aug 2023 01:05    TPro  6.7  /  Alb  3.6  /  TBili  <0.2  /  DBili  x   /  AST  53<H>  /  ALT  19  /  AlkPhos  90  08-04      PT/INR - ( 04 Aug 2023 01:05 )   PT: 11.60 sec;   INR: 1.02 ratio         PTT - ( 04 Aug 2023 01:05 )  PTT:26.3 sec                                       Urinalysis Basic - ( 04 Aug 2023 02:08 )    Color: Yellow / Appearance: Turbid / S.023 / pH: x  Gluc: x / Ketone: Trace mg/dL  / Bili: Negative / Urobili: 0.2 mg/dL   Blood: x / Protein: 100 mg/dL / Nitrite: Negative   Leuk Esterase: Large / RBC: 12 /HPF / WBC >998 /HPF   Sq Epi: x / Non Sq Epi: 4 /HPF / Bacteria: Many /HPF        CARDIAC MARKERS ( 04 Aug 2023 01:05 )  x     / <0.01 ng/mL / x     / x     / x                                                LIVER FUNCTIONS - ( 04 Aug 2023 01:05 )  Alb: 3.6 g/dL / Pro: 6.7 g/dL / ALK PHOS: 90 U/L / ALT: 19 U/L / AST: 53 U/L / GGT: x                                              CXR reviewed by myself: mild patchy infiltrates which appear chronic on 2023 when compared to 3/28/2022    MEDICATIONS  (STANDING):  cefepime   IVPB      cefepime   IVPB 2000 milliGRAM(s) IV Intermittent every 8 hours  norepinephrine Infusion 0.05 MICROgram(s)/kG/Min (4.68 mL/Hr) IV Continuous <Continuous>  vancomycin  IVPB 750 milliGRAM(s) IV Intermittent every 12 hours  vancomycin  IVPB        MEDICATIONS  (PRN):         Patient is a 58y old  Female who presents with a chief complaint of coffee-ground emesis.    HPI:  Patient is a 57yo female with PMH of Down syndrome, nonverbal at baseline, hypothyroidism, seizure disorder, cerebral palsy, congenital pulmonary stenosis brought in by EMS from [facility] for 1 episode of coffee-ground emesis and hypoxia just prior to arrival.  Staff from [facility] report patient has had only 1 episode of vomiting but has otherwise been well.  On EMS arrival patient was noted to be satting in the 70%s  on room air but improved to 96% on 2 L nasal cannula.  No other reported recent cough, vomiting, fever, chills, rash.    UA grossly positive in the ED. Hb found to be stable. Hypotensive and started on norepinephrine.      PAST MEDICAL & SURGICAL HISTORY:  Down syndrome  Osteoporosis  Mild anemia  Neuropathy      S/P debridement  of R hip on 3/2/21          Occupational hx:    Social hx: Unable to obtain    FAMILY HISTORY:  No pertinent family history in first degree relatives    :  No known cardiovascular family history    ROS:  See HPI     Allergies    No Known Allergies    Intolerances          PHYSICAL EXAM    ICU Vital Signs Last 24 Hrs  T(C): 36.6 (04 Aug 2023 05:23), Max: 38.9 (04 Aug 2023 00:50)  T(F): 97.9 (04 Aug 2023 05:23), Max: 102 (04 Aug 2023 00:50)  HR: 60 (04 Aug 2023 07:08) (60 - 137)  BP: 87/43 (04 Aug 2023 07:08) (75/36 - 118/58)  BP(mean): 84 (04 Aug 2023 04:38) (84 - 84)  RR: 18 (04 Aug 2023 04:38) (17 - 19)  SpO2: 98% (04 Aug 2023 04:38) (70% - 99%)    O2 Parameters below as of 04 Aug 2023 04:38  Patient On (Oxygen Delivery Method): nasal cannula  O2 Flow (L/min): 3      CONSTITUTIONAL:  Ill-appearing. Well nourished. In NAD    ENT:   Airway patent    EYES:   Clear bilaterally,   pupils equal,   round and reactive to light.    CARDIAC:   Tachycardic,   regular rhythm.      RESPIRATORY:   No wheezing  Normal chest expansion  Not tachypneic,  No use of accessory muscles    GASTROINTESTINAL:  Abdomen soft,   non-tender,   no guarding,   + BS    MUSCULOSKELETAL:   Contracted  no clubbing, cyanosis    NEUROLOGICAL:   Non-verbal  Not following simple commands        LABS:                          11.6   16.98 )-----------( 391      ( 04 Aug 2023 01:05 )             37.7                                               08-04    141  |  100  |  24<H>  ----------------------------<  176<H>  5.2<H>   |  28  |  1.1    Ca    9.2      04 Aug 2023 01:05    TPro  6.7  /  Alb  3.6  /  TBili  <0.2  /  DBili  x   /  AST  53<H>  /  ALT  19  /  AlkPhos  90  08-04      PT/INR - ( 04 Aug 2023 01:05 )   PT: 11.60 sec;   INR: 1.02 ratio         PTT - ( 04 Aug 2023 01:05 )  PTT:26.3 sec                                       Urinalysis Basic - ( 04 Aug 2023 02:08 )    Color: Yellow / Appearance: Turbid / S.023 / pH: x  Gluc: x / Ketone: Trace mg/dL  / Bili: Negative / Urobili: 0.2 mg/dL   Blood: x / Protein: 100 mg/dL / Nitrite: Negative   Leuk Esterase: Large / RBC: 12 /HPF / WBC >998 /HPF   Sq Epi: x / Non Sq Epi: 4 /HPF / Bacteria: Many /HPF        CARDIAC MARKERS ( 04 Aug 2023 01:05 )  x     / <0.01 ng/mL / x     / x     / x                                                LIVER FUNCTIONS - ( 04 Aug 2023 01:05 )  Alb: 3.6 g/dL / Pro: 6.7 g/dL / ALK PHOS: 90 U/L / ALT: 19 U/L / AST: 53 U/L / GGT: x                                              CXR reviewed by myself: mild patchy infiltrates which appear chronic on 2023 when compared to 3/28/2022    MEDICATIONS  (STANDING):  cefepime   IVPB      cefepime   IVPB 2000 milliGRAM(s) IV Intermittent every 8 hours  norepinephrine Infusion 0.05 MICROgram(s)/kG/Min (4.68 mL/Hr) IV Continuous <Continuous>  vancomycin  IVPB 750 milliGRAM(s) IV Intermittent every 12 hours  vancomycin  IVPB        MEDICATIONS  (PRN):

## 2023-08-04 NOTE — H&P ADULT - NSHPPHYSICALEXAM_GEN_ALL_CORE
PHYSICAL EXAM:  GENERAL: NAD, no signs of respiratory distress  HEAD:  Atraumatic, Normocephalic  EYES:   NECK: Supple, No JVD  CHEST/LUNG: Clear to auscultation bilaterally; No wheeze; No crackles; No accessory muscles used  HEART: Regular rate and rhythm; No murmurs;   ABDOMEN: Soft, Nontender, Nondistended; Bowel sounds present; No guarding  EXTREMITIES:  2+ Peripheral Pulses, No cyanosis or edema  PSYCH: Non verbal   NEUROLOGY: non-focal  SKIN: No rashes or lesions

## 2023-08-05 LAB
ALBUMIN SERPL ELPH-MCNC: 2.7 G/DL — LOW (ref 3.5–5.2)
ALP SERPL-CCNC: 70 U/L — SIGNIFICANT CHANGE UP (ref 30–115)
ALT FLD-CCNC: 11 U/L — SIGNIFICANT CHANGE UP (ref 0–41)
ANION GAP SERPL CALC-SCNC: 7 MMOL/L — SIGNIFICANT CHANGE UP (ref 7–14)
AST SERPL-CCNC: 17 U/L — SIGNIFICANT CHANGE UP (ref 0–41)
BASOPHILS # BLD AUTO: 0.04 K/UL — SIGNIFICANT CHANGE UP (ref 0–0.2)
BASOPHILS NFR BLD AUTO: 0.3 % — SIGNIFICANT CHANGE UP (ref 0–1)
BILIRUB SERPL-MCNC: 0.2 MG/DL — SIGNIFICANT CHANGE UP (ref 0.2–1.2)
BLD GP AB SCN SERPL QL: SIGNIFICANT CHANGE UP
BUN SERPL-MCNC: 9 MG/DL — LOW (ref 10–20)
CALCIUM SERPL-MCNC: 8.3 MG/DL — LOW (ref 8.4–10.4)
CHLORIDE SERPL-SCNC: 107 MMOL/L — SIGNIFICANT CHANGE UP (ref 98–110)
CO2 SERPL-SCNC: 29 MMOL/L — SIGNIFICANT CHANGE UP (ref 17–32)
CREAT SERPL-MCNC: 0.8 MG/DL — SIGNIFICANT CHANGE UP (ref 0.7–1.5)
D DIMER BLD IA.RAPID-MCNC: 325 NG/ML DDU — HIGH
EGFR: 86 ML/MIN/1.73M2 — SIGNIFICANT CHANGE UP
EOSINOPHIL # BLD AUTO: 0.19 K/UL — SIGNIFICANT CHANGE UP (ref 0–0.7)
EOSINOPHIL NFR BLD AUTO: 1.6 % — SIGNIFICANT CHANGE UP (ref 0–8)
GLUCOSE SERPL-MCNC: 101 MG/DL — HIGH (ref 70–99)
HCT VFR BLD CALC: 25.9 % — LOW (ref 37–47)
HCT VFR BLD CALC: 28.7 % — LOW (ref 37–47)
HGB BLD-MCNC: 8 G/DL — LOW (ref 12–16)
HGB BLD-MCNC: 8.8 G/DL — LOW (ref 12–16)
IMM GRANULOCYTES NFR BLD AUTO: 0.3 % — SIGNIFICANT CHANGE UP (ref 0.1–0.3)
LACTATE SERPL-SCNC: 1.1 MMOL/L — SIGNIFICANT CHANGE UP (ref 0.7–2)
LYMPHOCYTES # BLD AUTO: 1.23 K/UL — SIGNIFICANT CHANGE UP (ref 1.2–3.4)
LYMPHOCYTES # BLD AUTO: 10.2 % — LOW (ref 20.5–51.1)
MAGNESIUM SERPL-MCNC: 1.7 MG/DL — LOW (ref 1.8–2.4)
MCHC RBC-ENTMCNC: 25.6 PG — LOW (ref 27–31)
MCHC RBC-ENTMCNC: 26 PG — LOW (ref 27–31)
MCHC RBC-ENTMCNC: 30.7 G/DL — LOW (ref 32–37)
MCHC RBC-ENTMCNC: 30.9 G/DL — LOW (ref 32–37)
MCV RBC AUTO: 82.7 FL — SIGNIFICANT CHANGE UP (ref 81–99)
MCV RBC AUTO: 84.9 FL — SIGNIFICANT CHANGE UP (ref 81–99)
MONOCYTES # BLD AUTO: 0.39 K/UL — SIGNIFICANT CHANGE UP (ref 0.1–0.6)
MONOCYTES NFR BLD AUTO: 3.2 % — SIGNIFICANT CHANGE UP (ref 1.7–9.3)
NEUTROPHILS # BLD AUTO: 10.2 K/UL — HIGH (ref 1.4–6.5)
NEUTROPHILS NFR BLD AUTO: 84.4 % — HIGH (ref 42.2–75.2)
NRBC # BLD: 0 /100 WBCS — SIGNIFICANT CHANGE UP (ref 0–0)
NRBC # BLD: 0 /100 WBCS — SIGNIFICANT CHANGE UP (ref 0–0)
PLATELET # BLD AUTO: 269 K/UL — SIGNIFICANT CHANGE UP (ref 130–400)
PLATELET # BLD AUTO: 269 K/UL — SIGNIFICANT CHANGE UP (ref 130–400)
PMV BLD: 10 FL — SIGNIFICANT CHANGE UP (ref 7.4–10.4)
PMV BLD: 9.8 FL — SIGNIFICANT CHANGE UP (ref 7.4–10.4)
POTASSIUM SERPL-MCNC: 3.8 MMOL/L — SIGNIFICANT CHANGE UP (ref 3.5–5)
POTASSIUM SERPL-SCNC: 3.8 MMOL/L — SIGNIFICANT CHANGE UP (ref 3.5–5)
PROCALCITONIN SERPL-MCNC: 7.82 NG/ML — HIGH (ref 0.02–0.1)
PROT SERPL-MCNC: 5.3 G/DL — LOW (ref 6–8)
RBC # BLD: 3.13 M/UL — LOW (ref 4.2–5.4)
RBC # BLD: 3.38 M/UL — LOW (ref 4.2–5.4)
RBC # FLD: 15.6 % — HIGH (ref 11.5–14.5)
RBC # FLD: 15.6 % — HIGH (ref 11.5–14.5)
SODIUM SERPL-SCNC: 143 MMOL/L — SIGNIFICANT CHANGE UP (ref 135–146)
TSH SERPL-MCNC: 1.63 UIU/ML — SIGNIFICANT CHANGE UP (ref 0.27–4.2)
WBC # BLD: 12.09 K/UL — HIGH (ref 4.8–10.8)
WBC # BLD: 12.1 K/UL — HIGH (ref 4.8–10.8)
WBC # FLD AUTO: 12.09 K/UL — HIGH (ref 4.8–10.8)
WBC # FLD AUTO: 12.1 K/UL — HIGH (ref 4.8–10.8)

## 2023-08-05 PROCEDURE — 99223 1ST HOSP IP/OBS HIGH 75: CPT

## 2023-08-05 RX ORDER — AZITHROMYCIN 500 MG/1
500 TABLET, FILM COATED ORAL ONCE
Refills: 0 | Status: COMPLETED | OUTPATIENT
Start: 2023-08-05 | End: 2023-08-05

## 2023-08-05 RX ORDER — CEFTRIAXONE 500 MG/1
1000 INJECTION, POWDER, FOR SOLUTION INTRAMUSCULAR; INTRAVENOUS EVERY 24 HOURS
Refills: 0 | Status: DISCONTINUED | OUTPATIENT
Start: 2023-08-05 | End: 2023-08-08

## 2023-08-05 RX ORDER — CHLORHEXIDINE GLUCONATE 213 G/1000ML
1 SOLUTION TOPICAL DAILY
Refills: 0 | Status: DISCONTINUED | OUTPATIENT
Start: 2023-08-05 | End: 2023-08-11

## 2023-08-05 RX ORDER — AZITHROMYCIN 500 MG/1
500 TABLET, FILM COATED ORAL EVERY 24 HOURS
Refills: 0 | Status: DISCONTINUED | OUTPATIENT
Start: 2023-08-06 | End: 2023-08-09

## 2023-08-05 RX ORDER — AZITHROMYCIN 500 MG/1
TABLET, FILM COATED ORAL
Refills: 0 | Status: DISCONTINUED | OUTPATIENT
Start: 2023-08-05 | End: 2023-08-09

## 2023-08-05 RX ADMIN — CHLORHEXIDINE GLUCONATE 1 APPLICATION(S): 213 SOLUTION TOPICAL at 11:16

## 2023-08-05 RX ADMIN — POLYETHYLENE GLYCOL 3350 17 GRAM(S): 17 POWDER, FOR SOLUTION ORAL at 11:16

## 2023-08-05 RX ADMIN — RALOXIFENE HYDROCHLORIDE 60 MILLIGRAM(S): 60 TABLET, COATED ORAL at 11:16

## 2023-08-05 RX ADMIN — MIDODRINE HYDROCHLORIDE 10 MILLIGRAM(S): 2.5 TABLET ORAL at 21:07

## 2023-08-05 RX ADMIN — Medication 3 MILLIGRAM(S): at 21:08

## 2023-08-05 RX ADMIN — GABAPENTIN 600 MILLIGRAM(S): 400 CAPSULE ORAL at 11:15

## 2023-08-05 RX ADMIN — MIDODRINE HYDROCHLORIDE 10 MILLIGRAM(S): 2.5 TABLET ORAL at 05:13

## 2023-08-05 RX ADMIN — MIDODRINE HYDROCHLORIDE 10 MILLIGRAM(S): 2.5 TABLET ORAL at 14:21

## 2023-08-05 RX ADMIN — CEFTRIAXONE 100 MILLIGRAM(S): 500 INJECTION, POWDER, FOR SOLUTION INTRAMUSCULAR; INTRAVENOUS at 09:53

## 2023-08-05 RX ADMIN — PANTOPRAZOLE SODIUM 40 MILLIGRAM(S): 20 TABLET, DELAYED RELEASE ORAL at 17:03

## 2023-08-05 RX ADMIN — Medication 1 TABLET(S): at 11:16

## 2023-08-05 RX ADMIN — AZITHROMYCIN 255 MILLIGRAM(S): 500 TABLET, FILM COATED ORAL at 11:15

## 2023-08-05 RX ADMIN — PANTOPRAZOLE SODIUM 40 MILLIGRAM(S): 20 TABLET, DELAYED RELEASE ORAL at 05:13

## 2023-08-05 RX ADMIN — SENNA PLUS 2 TABLET(S): 8.6 TABLET ORAL at 21:08

## 2023-08-05 NOTE — SWALLOW BEDSIDE ASSESSMENT ADULT - COMMENTS
Pt known to SLP dept. from 4/2022 w/ MBSS recs for puree, thin liquids. No laryngeal penetration/aspiration during study. Pt known to SLP dept. from 4/2022 w/ VFSS recs for puree, thin liquids. No laryngeal penetration/aspiration during study.

## 2023-08-05 NOTE — CHART NOTE - NSCHARTNOTEFT_GEN_A_CORE
MICU Transfer Note    Transfer from: MICU  Transfer to:  Medical floor      HPI:  57yo F w/a PMH of Down syndrome, nonverbal at baseline, hypothyroidism, cerebral palsy, congenital pulmonary stenosis brought in by EMS from [facility] for 1 episode of coffee-ground emesis and hypoxia just prior to arrival.  Staff from [facility] report patient has had only 1 episode of vomiting but has otherwise been well.  On EMS arrival patient was noted to be satting in the 70%s  on room air but improved to 96% on 2 L nasal cannula.  No other reported recent cough, vomiting, fever, chills, rash.    In the ED,   Vitals - BP 42929, , RR 17, T 97.8, satting 70% on RA,    Labs - WBC 16.98, Hgb 11.6, , Na 141, K 5.2 (Hemolyzed), BUN 24, Cr 1.1, Trop <.01, UA +  Imaging -  CT Chest and CTAP   - Bilateral bronchial wall thickening with lower lobe airway narrowing and bilateral patchy airspace opacities with more consolidative change in the left hilar and lower lobe region. Findings may be infectious/inflammatory. Correlate for multifocal pneumonia.  - Mildly enlarged mediastinal lymph nodes, nonspecific, possibly reactive. Follow-up posttreatment is recommended to ensure resolution of pulmonary findings in lymphadenopathy as the possibility of underlying neoplasm is not excluded.  - Moderate size hiatal hernia and wall thickening of the mid to lower esophagus. Findings may reflect esophagitis. Nonemergent direct visualization can be obtained for further evaluation.  - Urinary bladder appears mildly wall thickening. Correlate with urinalysis to exclude cystitis.  - Hepatic steatosis.    UA grossly positive in the ED. Hb found to be stable. Hypotensive and started on norepinephrine.    Admitted to the ICU for UGIB and Septic Shock 2/2 UTI     MICU COURSE:  In the ICU patient was closely monitored. Patient was seen by GI, where they recommended starting patient on clear liquid diet and advance slowly as tolerated, PPI BID IV while on pressors, can switch to PO once off pressors, monitoring  H/H with active type and screen , Avoid NSAIDs and performing an EGD outpatient. If any signs of GI bleeding or further drop of Hb recall GI. Patient is currently stable w/ no signs of active bleeding.      ASSESSMENT & PLAN:     CNS: Avoid sedatives.     HEENT: Oral care.  Speech and swallow eval apprec.     PULMONARY: HOB @ 45 degrees. Aspiration precautions.  Wean O2 as tolerated.      CARDIOVASCULAR: Goal directed fluid resuscitation. Weaned off pressors. Target MAP 60-65. Avoid volume overload. Cardiac enzymes x2 neg.  FU Echo.     GI: GI prophylaxis. Feeding per Sp&Sw. Bowel regimen.  Protonix IV BID.  No EGD pre GI.    RENAL: Follow up renal function and lytes. Correct as needed.    INFECTIOUS DISEASE: Monitor vital signs. Follow up cultures. MRSA nares. Procalcitonin elevated. Rocephin/Azithro.    HEMATOLOGICAL: SCDs for DVT prophylaxis. Monitor CBC and Coags. Transfuse to keep Hb >7. D-dimer.  Repeat CBC, if stable start chemical DVT PPx.    ENDOCRINE: Follow up fingersticks. Insulin protocol if needed. TSH level.    MUSCULOSKELETAL: Bedrest    For Follow-Up:  - F/U with speech and swallow to advance diet  - Continue antibiotics coarse - >. Rocephin/Azithro.  - F/U repeat CBC -> if stable start chemical DVT PPx also monitor H/H

## 2023-08-05 NOTE — PROGRESS NOTE ADULT - SUBJECTIVE AND OBJECTIVE BOX
Patient is a 58y old  Female who presents with a chief complaint of Coffee ground emesis and hypoxia (04 Aug 2023 14:01)        Over Night Events:        ROS:     All ROS are negative except HPI         PHYSICAL EXAM    ICU Vital Signs Last 24 Hrs  T(C): 37.1 (05 Aug 2023 04:00), Max: 37.7 (04 Aug 2023 09:15)  T(F): 98.8 (05 Aug 2023 04:00), Max: 99.8 (04 Aug 2023 09:15)  HR: 61 (05 Aug 2023 07:00) (61 - 115)  BP: 96/48 (05 Aug 2023 07:00) (75/38 - 147/65)  BP(mean): 69 (05 Aug 2023 07:00) (50 - 94)  ABP: --  ABP(mean): --  RR: 21 (05 Aug 2023 07:00) (10 - 38)  SpO2: 98% (05 Aug 2023 07:00) (93% - 100%)    O2 Parameters below as of 05 Aug 2023 07:00  Patient On (Oxygen Delivery Method): nasal cannula  O2 Flow (L/min): 2          CONSTITUTIONAL:  Well nourished.  NAD    ENT:   Airway patent,   Mouth with normal mucosa.   No thrush    EYES:   Pupils equal,   Round and reactive to light.    CARDIAC:   Normal rate,   Regular rhythm.    No edema      Vascular:  Normal systolic impulse  No Carotid bruits    RESPIRATORY:   No wheezing  Bilateral BS  Normal chest expansion  Not tachypneic,  No use of accessory muscles    GASTROINTESTINAL:  Abdomen soft,   Non-tender,   No guarding,   + BS    MUSCULOSKELETAL:   Range of motion is not limited,  No clubbing, cyanosis    NEUROLOGICAL:   Alert and oriented   No motor  deficits.    SKIN:   Skin normal color for race,   Warm and dry and intact.   No evidence of rash.    PSYCHIATRIC:   Normal mood and affect.   No apparent risk to self or others.    HEMATOLOGICAL:  No cervical  lymphadenopathy.  no inguinal lymphadenopathy      08-04-23 @ 07:01  -  08-05-23 @ 07:00  --------------------------------------------------------  IN:    IV PiggyBack: 50 mL    Lactated Ringers Bolus: 1750 mL    Norepinephrine: 26.6 mL    Sodium Chloride 0.9% Bolus: 250 mL  Total IN: 2076.6 mL    OUT:    Voided (mL): 1450 mL  Total OUT: 1450 mL    Total NET: 626.6 mL          LABS:                            8.0    12.09 )-----------( 269      ( 05 Aug 2023 04:27 )             25.9                                               08-05    143  |  107  |  9<L>  ----------------------------<  101<H>  3.8   |  29  |  0.8    Ca    8.3<L>      05 Aug 2023 04:27  Mg     1.7     08-05    TPro  5.3<L>  /  Alb  2.7<L>  /  TBili  0.2  /  DBili  x   /  AST  17  /  ALT  11  /  AlkPhos  70  08-05      PT/INR - ( 04 Aug 2023 01:05 )   PT: 11.60 sec;   INR: 1.02 ratio         PTT - ( 04 Aug 2023 01:05 )  PTT:26.3 sec                                       Urinalysis Basic - ( 05 Aug 2023 04:27 )    Color: x / Appearance: x / SG: x / pH: x  Gluc: 101 mg/dL / Ketone: x  / Bili: x / Urobili: x   Blood: x / Protein: x / Nitrite: x   Leuk Esterase: x / RBC: x / WBC x   Sq Epi: x / Non Sq Epi: x / Bacteria: x        CARDIAC MARKERS ( 04 Aug 2023 16:13 )  x     / <0.01 ng/mL / x     / x     / x      CARDIAC MARKERS ( 04 Aug 2023 01:05 )  x     / <0.01 ng/mL / x     / x     / x                                                LIVER FUNCTIONS - ( 05 Aug 2023 04:27 )  Alb: 2.7 g/dL / Pro: 5.3 g/dL / ALK PHOS: 70 U/L / ALT: 11 U/L / AST: 17 U/L / GGT: x                                                                                                                                       MEDICATIONS  (STANDING):  chlorhexidine 2% Cloths 1 Application(s) Topical daily  gabapentin 600 milliGRAM(s) Oral daily  melatonin 3 milliGRAM(s) Oral at bedtime  midodrine 10 milliGRAM(s) Oral every 8 hours  multivitamin 1 Tablet(s) Oral daily  norepinephrine Infusion 0.05 MICROgram(s)/kG/Min (4.68 mL/Hr) IV Continuous <Continuous>  pantoprazole  Injectable 40 milliGRAM(s) IV Push every 12 hours  polyethylene glycol 3350 17 Gram(s) Oral daily  raloxifene 60 milliGRAM(s) Oral daily  senna 2 Tablet(s) Oral at bedtime    MEDICATIONS  (PRN):      New X-rays reviewed:                                                                                  ECHO    CXR interpreted by me:       Patient is a 58y old  Female who presents with a chief complaint of Coffee ground emesis and hypoxia (04 Aug 2023 14:01)        Over Night Events:  Dec Hb.  No overt bleeding. Off pressors.         ROS:     All ROS are negative except HPI         PHYSICAL EXAM    ICU Vital Signs Last 24 Hrs  T(C): 37.1 (05 Aug 2023 04:00), Max: 37.7 (04 Aug 2023 09:15)  T(F): 98.8 (05 Aug 2023 04:00), Max: 99.8 (04 Aug 2023 09:15)  HR: 61 (05 Aug 2023 07:00) (61 - 115)  BP: 96/48 (05 Aug 2023 07:00) (75/38 - 147/65)  BP(mean): 69 (05 Aug 2023 07:00) (50 - 94)  ABP: --  ABP(mean): --  RR: 21 (05 Aug 2023 07:00) (10 - 38)  SpO2: 98% (05 Aug 2023 07:00) (93% - 100%)    O2 Parameters below as of 05 Aug 2023 07:00  Patient On (Oxygen Delivery Method): nasal cannula  O2 Flow (L/min): 2          CONSTITUTIONAL:  NAD    ENT:   Airway patent,     EYES:   Pupils equal,     CARDIAC:   Normal rate,   Regular rhythm.    No edema    RESPIRATORY:   No wheezing  Bilateral BS  Normal chest expansion  Not tachypneic,  No use of accessory muscles    GASTROINTESTINAL:  Abdomen soft,   Non-tender,   No guarding,   + BS    MUSCULOSKELETAL:   Range of motion is not limited,  No clubbing, cyanosis    NEUROLOGICAL:   No motor  deficits.    SKIN:   Skin normal color for race,   Warm and dry and intact.   No evidence of rash.      08-04-23 @ 07:01  -  08-05-23 @ 07:00  --------------------------------------------------------  IN:    IV PiggyBack: 50 mL    Lactated Ringers Bolus: 1750 mL    Norepinephrine: 26.6 mL    Sodium Chloride 0.9% Bolus: 250 mL  Total IN: 2076.6 mL    OUT:    Voided (mL): 1450 mL  Total OUT: 1450 mL    Total NET: 626.6 mL          LABS:                            8.0    12.09 )-----------( 269      ( 05 Aug 2023 04:27 )             25.9                                               08-05    143  |  107  |  9<L>  ----------------------------<  101<H>  3.8   |  29  |  0.8    Ca    8.3<L>      05 Aug 2023 04:27  Mg     1.7     08-05    TPro  5.3<L>  /  Alb  2.7<L>  /  TBili  0.2  /  DBili  x   /  AST  17  /  ALT  11  /  AlkPhos  70  08-05      PT/INR - ( 04 Aug 2023 01:05 )   PT: 11.60 sec;   INR: 1.02 ratio         PTT - ( 04 Aug 2023 01:05 )  PTT:26.3 sec                                       Urinalysis Basic - ( 05 Aug 2023 04:27 )    Color: x / Appearance: x / SG: x / pH: x  Gluc: 101 mg/dL / Ketone: x  / Bili: x / Urobili: x   Blood: x / Protein: x / Nitrite: x   Leuk Esterase: x / RBC: x / WBC x   Sq Epi: x / Non Sq Epi: x / Bacteria: x        CARDIAC MARKERS ( 04 Aug 2023 16:13 )  x     / <0.01 ng/mL / x     / x     / x      CARDIAC MARKERS ( 04 Aug 2023 01:05 )  x     / <0.01 ng/mL / x     / x     / x                                                LIVER FUNCTIONS - ( 05 Aug 2023 04:27 )  Alb: 2.7 g/dL / Pro: 5.3 g/dL / ALK PHOS: 70 U/L / ALT: 11 U/L / AST: 17 U/L / GGT: x                                                                                                                                       MEDICATIONS  (STANDING):  chlorhexidine 2% Cloths 1 Application(s) Topical daily  gabapentin 600 milliGRAM(s) Oral daily  melatonin 3 milliGRAM(s) Oral at bedtime  midodrine 10 milliGRAM(s) Oral every 8 hours  multivitamin 1 Tablet(s) Oral daily  norepinephrine Infusion 0.05 MICROgram(s)/kG/Min (4.68 mL/Hr) IV Continuous <Continuous>  pantoprazole  Injectable 40 milliGRAM(s) IV Push every 12 hours  polyethylene glycol 3350 17 Gram(s) Oral daily  raloxifene 60 milliGRAM(s) Oral daily  senna 2 Tablet(s) Oral at bedtime    MEDICATIONS  (PRN):      New X-rays reviewed:                                                                                  ECHO    CXR interpreted by me:

## 2023-08-05 NOTE — PROGRESS NOTE ADULT - ASSESSMENT
IMPRESSION:    Septic shock  Sepsis POA  UTI  Possible aspiration   Suspected UGIB  HO Down syndrome, nonverbal at baseline  HO Hypothyroidism  HO seizure disorder, not on AEDs  HO cerebral palsy  HO congenital pulmonary stenosis    PLAN:    CNS: Avoid sedatives.     HEENT: Oral care.  SPeech and swallow     PULMONARY: HOB @ 45 degrees. Aspiration precautions.  Wean O2 as tolerated.      CARDIOVASCULAR: Goal directed fluid resuscitation. Wean pressors. Target MAP 60-65. Avoid volume overload. Cardiac enzymes x2. Echo.     GI: GI prophylaxis. NPO. Bowel regimen. Protonix IV BID.  GI evaluation     RENAL: Follow up renal function and lytes. Correct as needed.    INFECTIOUS DISEASE: Monitor vital signs. Follow up cultures. MRSA nares. Procalcitonin. Cefepime for now     HEMATOLOGICAL: SCDs for DVT prophylaxis. Monitor CBC and Coags. Transfuse to keep Hb >7. D-dimer    ENDOCRINE: Follow up fingersticks. Insulin protocol if needed. TSH level.    MUSCULOSKELETAL: Bedrest    DISPO: Patient requires MICU monitoring at this time  IMPRESSION:    Septic shock  Sepsis POA  UTI  Possible aspiration   Multifocal PNA  Suspected UGIB  HO Down syndrome, nonverbal at baseline  HO Hypothyroidism  HO seizure disorder, not on AEDs  HO cerebral palsy  HO congenital pulmonary stenosis    PLAN:    CNS: Avoid sedatives.     HEENT: Oral care.  Speech and swallow eval apprec.     PULMONARY: HOB @ 45 degrees. Aspiration precautions.  Wean O2 as tolerated.      CARDIOVASCULAR: Goal directed fluid resuscitation. Weaned off pressors. Target MAP 60-65. Avoid volume overload. Cardiac enzymes x2 neg.  FU Echo.     GI: GI prophylaxis. Feeding per Sp&Sw. Bowel regimen.  Protonix IV BID.  No EGD pre GI.    RENAL: Follow up renal function and lytes. Correct as needed.    INFECTIOUS DISEASE: Monitor vital signs. Follow up cultures. MRSA nares. Procalcitonin elaveted. Rocephin/Azithro.    HEMATOLOGICAL: SCDs for DVT prophylaxis. Monitor CBC and Coags. Transfuse to keep Hb >7. D-dimer.  Repeat CBC, if stable start chemical DVT PPx.    ENDOCRINE: Follow up fingersticks. Insulin protocol if needed. TSH level.    MUSCULOSKELETAL: Bedrest    DGTF  IMPRESSION:    Septic shock  Sepsis POA  UTI  Possible aspiration   Multifocal PNA  Suspected UGIB  Mediastinal Lymphadenopathy, possible reactive  HO Down syndrome, nonverbal at baseline  HO Hypothyroidism  HO seizure disorder, not on AEDs  HO cerebral palsy  HO congenital pulmonary stenosis    PLAN:    CNS: Avoid sedatives.     HEENT: Oral care.  Speech and swallow eval noted.     PULMONARY: HOB @ 45 degrees. Aspiration precautions.  Wean O2 as tolerated.  Repeat CT chest in 3 months.    CARDIOVASCULAR: Goal directed fluid resuscitation. Weaned off pressors. Target MAP 60-65. Avoid volume overload. Cardiac enzymes x2 neg.  FU Echo.     GI: GI prophylaxis. Feeding per Sp&Sw. Bowel regimen.  Protonix IV BID.  No EGD pre GI.  Repeat CBC, recall GI if low.  Trend H&H.    RENAL: Follow up renal function and lytes. Correct as needed.    INFECTIOUS DISEASE: Monitor vital signs. Follow up cultures. MRSA nares. Procalcitonin elevated.  Rocephin/Azithro.    HEMATOLOGICAL: SCDs for DVT prophylaxis. Monitor CBC and Coags. Transfuse to keep Hb >7. D-dimer.  Repeat CBC.    ENDOCRINE: Follow up fingersticks. Insulin protocol if needed. TSH level.    MUSCULOSKELETAL: Bedrest    MICU monitoring

## 2023-08-06 LAB
ALBUMIN SERPL ELPH-MCNC: 3 G/DL — LOW (ref 3.5–5.2)
ALP SERPL-CCNC: 72 U/L — SIGNIFICANT CHANGE UP (ref 30–115)
ALT FLD-CCNC: 12 U/L — SIGNIFICANT CHANGE UP (ref 0–41)
ANION GAP SERPL CALC-SCNC: 8 MMOL/L — SIGNIFICANT CHANGE UP (ref 7–14)
AST SERPL-CCNC: 19 U/L — SIGNIFICANT CHANGE UP (ref 0–41)
BASOPHILS # BLD AUTO: 0.06 K/UL — SIGNIFICANT CHANGE UP (ref 0–0.2)
BASOPHILS NFR BLD AUTO: 0.8 % — SIGNIFICANT CHANGE UP (ref 0–1)
BILIRUB SERPL-MCNC: <0.2 MG/DL — SIGNIFICANT CHANGE UP (ref 0.2–1.2)
BUN SERPL-MCNC: 8 MG/DL — LOW (ref 10–20)
CALCIUM SERPL-MCNC: 8.2 MG/DL — LOW (ref 8.4–10.5)
CHLORIDE SERPL-SCNC: 105 MMOL/L — SIGNIFICANT CHANGE UP (ref 98–110)
CO2 SERPL-SCNC: 28 MMOL/L — SIGNIFICANT CHANGE UP (ref 17–32)
CREAT SERPL-MCNC: 0.8 MG/DL — SIGNIFICANT CHANGE UP (ref 0.7–1.5)
EGFR: 86 ML/MIN/1.73M2 — SIGNIFICANT CHANGE UP
EOSINOPHIL # BLD AUTO: 0.26 K/UL — SIGNIFICANT CHANGE UP (ref 0–0.7)
EOSINOPHIL NFR BLD AUTO: 3.5 % — SIGNIFICANT CHANGE UP (ref 0–8)
GLUCOSE SERPL-MCNC: 102 MG/DL — HIGH (ref 70–99)
HCT VFR BLD CALC: 27.9 % — LOW (ref 37–47)
HGB BLD-MCNC: 8.6 G/DL — LOW (ref 12–16)
IMM GRANULOCYTES NFR BLD AUTO: 0.4 % — HIGH (ref 0.1–0.3)
LYMPHOCYTES # BLD AUTO: 1.6 K/UL — SIGNIFICANT CHANGE UP (ref 1.2–3.4)
LYMPHOCYTES # BLD AUTO: 21.5 % — SIGNIFICANT CHANGE UP (ref 20.5–51.1)
MAGNESIUM SERPL-MCNC: 2 MG/DL — SIGNIFICANT CHANGE UP (ref 1.8–2.4)
MCHC RBC-ENTMCNC: 25.9 PG — LOW (ref 27–31)
MCHC RBC-ENTMCNC: 30.8 G/DL — LOW (ref 32–37)
MCV RBC AUTO: 84 FL — SIGNIFICANT CHANGE UP (ref 81–99)
MONOCYTES # BLD AUTO: 0.42 K/UL — SIGNIFICANT CHANGE UP (ref 0.1–0.6)
MONOCYTES NFR BLD AUTO: 5.6 % — SIGNIFICANT CHANGE UP (ref 1.7–9.3)
NEUTROPHILS # BLD AUTO: 5.07 K/UL — SIGNIFICANT CHANGE UP (ref 1.4–6.5)
NEUTROPHILS NFR BLD AUTO: 68.2 % — SIGNIFICANT CHANGE UP (ref 42.2–75.2)
NRBC # BLD: 0 /100 WBCS — SIGNIFICANT CHANGE UP (ref 0–0)
PHOSPHATE SERPL-MCNC: 2.9 MG/DL — SIGNIFICANT CHANGE UP (ref 2.1–4.9)
PLATELET # BLD AUTO: 297 K/UL — SIGNIFICANT CHANGE UP (ref 130–400)
PMV BLD: 9.9 FL — SIGNIFICANT CHANGE UP (ref 7.4–10.4)
POTASSIUM SERPL-MCNC: 4 MMOL/L — SIGNIFICANT CHANGE UP (ref 3.5–5)
POTASSIUM SERPL-SCNC: 4 MMOL/L — SIGNIFICANT CHANGE UP (ref 3.5–5)
PROT SERPL-MCNC: 5.4 G/DL — LOW (ref 6–8)
RBC # BLD: 3.32 M/UL — LOW (ref 4.2–5.4)
RBC # FLD: 15.5 % — HIGH (ref 11.5–14.5)
SODIUM SERPL-SCNC: 141 MMOL/L — SIGNIFICANT CHANGE UP (ref 135–146)
WBC # BLD: 7.44 K/UL — SIGNIFICANT CHANGE UP (ref 4.8–10.8)
WBC # FLD AUTO: 7.44 K/UL — SIGNIFICANT CHANGE UP (ref 4.8–10.8)

## 2023-08-06 PROCEDURE — 99233 SBSQ HOSP IP/OBS HIGH 50: CPT

## 2023-08-06 RX ORDER — SODIUM CHLORIDE 9 MG/ML
500 INJECTION, SOLUTION INTRAVENOUS ONCE
Refills: 0 | Status: COMPLETED | OUTPATIENT
Start: 2023-08-06 | End: 2023-08-06

## 2023-08-06 RX ADMIN — PANTOPRAZOLE SODIUM 40 MILLIGRAM(S): 20 TABLET, DELAYED RELEASE ORAL at 05:01

## 2023-08-06 RX ADMIN — MIDODRINE HYDROCHLORIDE 10 MILLIGRAM(S): 2.5 TABLET ORAL at 05:01

## 2023-08-06 RX ADMIN — SODIUM CHLORIDE 1000 MILLILITER(S): 9 INJECTION, SOLUTION INTRAVENOUS at 11:15

## 2023-08-06 RX ADMIN — CEFTRIAXONE 100 MILLIGRAM(S): 500 INJECTION, POWDER, FOR SOLUTION INTRAMUSCULAR; INTRAVENOUS at 08:22

## 2023-08-06 RX ADMIN — Medication 1 TABLET(S): at 11:15

## 2023-08-06 RX ADMIN — RALOXIFENE HYDROCHLORIDE 60 MILLIGRAM(S): 60 TABLET, COATED ORAL at 11:15

## 2023-08-06 RX ADMIN — AZITHROMYCIN 255 MILLIGRAM(S): 500 TABLET, FILM COATED ORAL at 08:21

## 2023-08-06 RX ADMIN — Medication 3 MILLIGRAM(S): at 22:59

## 2023-08-06 RX ADMIN — MIDODRINE HYDROCHLORIDE 10 MILLIGRAM(S): 2.5 TABLET ORAL at 22:59

## 2023-08-06 RX ADMIN — POLYETHYLENE GLYCOL 3350 17 GRAM(S): 17 POWDER, FOR SOLUTION ORAL at 11:15

## 2023-08-06 RX ADMIN — CHLORHEXIDINE GLUCONATE 1 APPLICATION(S): 213 SOLUTION TOPICAL at 11:14

## 2023-08-06 RX ADMIN — GABAPENTIN 600 MILLIGRAM(S): 400 CAPSULE ORAL at 11:15

## 2023-08-06 RX ADMIN — PANTOPRAZOLE SODIUM 40 MILLIGRAM(S): 20 TABLET, DELAYED RELEASE ORAL at 18:41

## 2023-08-06 RX ADMIN — SENNA PLUS 2 TABLET(S): 8.6 TABLET ORAL at 22:59

## 2023-08-06 RX ADMIN — MIDODRINE HYDROCHLORIDE 10 MILLIGRAM(S): 2.5 TABLET ORAL at 13:19

## 2023-08-06 NOTE — PROGRESS NOTE ADULT - ASSESSMENT
57yo F w/a PMH of Down syndrome, nonverbal at baseline, hypothyroidism, cerebral palsy, congenital pulmonary stenosis brought in by EMS from [facility] for 1 episode of coffee-ground emesis and hypoxia just prior to arrival.  Staff from [facility] report patient has had only 1 episode of vomiting but has otherwise been well.  On EMS arrival patient was noted to be satting in the 70%s  on room air but improved to 96% on 2 L nasal cannula.  No other reported recent cough, vomiting, fever, chills, rash.    In the ED,   Vitals - BP 24278, , RR 17, T 97.8, satting 70% on RA,    Labs - WBC 16.98, Hgb 11.6, , Na 141, K 5.2 (Hemolyzed), BUN 24, Cr 1.1, Trop <.01, UA +  Imaging -  CT Chest and CTAP   - Bilateral bronchial wall thickening with lower lobe airway narrowing and bilateral patchy airspace opacities with more consolidative change in the left hilar and lower lobe region. Findings may be infectious/inflammatory. Correlate for multifocal pneumonia.  - Mildly enlarged mediastinal lymph nodes, nonspecific, possibly reactive. Follow-up posttreatment is recommended to ensure resolution of pulmonary findings in lymphadenopathy as the possibility of underlying neoplasm is not excluded.  - Moderate size hiatal hernia and wall thickening of the mid to lower esophagus. Findings may reflect esophagitis. Nonemergent direct visualization can be obtained for further evaluation.  - Urinary bladder appears mildly wall thickening. Correlate with urinalysis to exclude cystitis.  - Hepatic steatosis.    UA grossly positive in the ED. Hb found to be stable. Hypotensive and started on norepinephrine.    Admitted to the ICU for UGIB and Septic Shock 2/2 UTI     MICU COURSE:  In the ICU patient was closely monitored. Patient was seen by GI, where they recommended starting patient on clear liquid diet and advance slowly as tolerated, PPI BID IV while on pressors, can switch to PO once off pressors, monitoring  H/H with active type and screen , Avoid NSAIDs and performing an EGD outpatient. If any signs of GI bleeding or further drop of Hb recall GI. Patient is currently stable w/ no signs of active bleeding.      ASSESSMENT & PLAN:     CNS: Avoid sedatives.     HEENT: Oral care.  Speech and swallow eval apprec.     PULMONARY: HOB @ 45 degrees. Aspiration precautions.  Wean O2 as tolerated.      CARDIOVASCULAR: Goal directed fluid resuscitation. Weaned off pressors. Target MAP 60-65. Avoid volume overload. Cardiac enzymes x2 neg.  FU Echo.     GI: GI prophylaxis. Feeding per Sp&Sw. Bowel regimen.  Protonix IV BID.  No EGD pre GI.    RENAL: Follow up renal function and lytes. Correct as needed.    INFECTIOUS DISEASE: Monitor vital signs. Follow up cultures. MRSA nares. Procalcitonin elevated. Rocephin/Azithro.    HEMATOLOGICAL: SCDs for DVT prophylaxis. Monitor CBC and Coags. Transfuse to keep Hb >7. D-dimer.  Repeat CBC, if stable start chemical DVT PPx.    ENDOCRINE: Follow up fingersticks. Insulin protocol if needed. TSH level.    MUSCULOSKELETAL: Bedrest

## 2023-08-06 NOTE — PROGRESS NOTE ADULT - SUBJECTIVE AND OBJECTIVE BOX
24H events:    Patient is a 58y old Female who presents with a chief complaint of Coffee ground emesis and hypoxia (05 Aug 2023 07:27)    Primary diagnosis of Sepsis due to urinary tract infection      Day 1:  Day 2:  Day 3:     Today is hospital day 2d. This morning patient was seen and examined at bedside, resting comfortably in bed.    No acute or major events overnight.    Code Status:    Family communication:  Contact date:  Name of person contacted:  Relationship to patient:  Communication details:  What matters most:    PAST MEDICAL & SURGICAL HISTORY  Down syndrome    Osteoporosis    Mild anemia    Neuropathy    S/P debridement  of R hip on 3/2/21      SOCIAL HISTORY:  Social History:      ALLERGIES:  No Known Allergies    MEDICATIONS:  STANDING MEDICATIONS  azithromycin  IVPB      azithromycin  IVPB 500 milliGRAM(s) IV Intermittent every 24 hours  cefTRIAXone   IVPB 1000 milliGRAM(s) IV Intermittent every 24 hours  chlorhexidine 2% Cloths 1 Application(s) Topical daily  gabapentin 600 milliGRAM(s) Oral daily  melatonin 3 milliGRAM(s) Oral at bedtime  midodrine 10 milliGRAM(s) Oral every 8 hours  multivitamin 1 Tablet(s) Oral daily  pantoprazole  Injectable 40 milliGRAM(s) IV Push every 12 hours  polyethylene glycol 3350 17 Gram(s) Oral daily  raloxifene 60 milliGRAM(s) Oral daily  senna 2 Tablet(s) Oral at bedtime    PRN MEDICATIONS    VITALS:   T(F): 98.1  HR: 97  BP: 100/57  RR: 22  SpO2: 97%    PHYSICAL EXAM:  GENERAL:   (  ) NAD, lying in bed comfortably     (  ) obtunded     (  ) lethargic     (  ) somnolent    HEAD:   (  ) Atraumatic     (  ) hematoma     (  ) laceration (specify location:       )     NECK:  (  ) Supple     (  ) neck stiffness     (  ) nuchal rigidity     (  )  no JVD     (  ) JVD present ( -- cm)    HEART:  Rate -->     (  ) normal rate     (  ) bradycardic     (  ) tachycardic  Rhythm -->     (  ) regular     (  ) regularly irregular     (  ) irregularly irregular  Murmurs -->     (  ) normal s1s2     (  ) systolic murmur     (  ) diastolic murmur     (  ) continuous murmur      (  ) S3 present     (  ) S4 present    LUNGS:   (  )Unlabored respirations     (  ) tachypnea  (  ) B/L air entry     (  ) decreased breath sounds in:  (location     )    (  ) no adventitious sound     (  ) crackles     (  ) wheezing      (  ) rhonchi      (specify location:       )  (  ) chest wall tenderness (specify location:       )    ABDOMEN:   (  ) Soft     (  ) tense   |   (  ) nondistended     (  ) distended   |   (  ) +BS     (  ) hypoactive bowel sounds     (  ) hyperactive bowel sounds  (  ) nontender     (  ) RUQ tenderness     (  ) RLQ tenderness     (  ) LLQ tenderness     (  ) epigastric tenderness     (  ) diffuse tenderness  (  ) Splenomegaly      (  ) Hepatomegaly      (  ) Jaundice     (  ) ecchymosis     EXTREMITIES:  (  ) Normal     (  ) Rash     (  ) ecchymosis     (  ) varicose veins      (  ) pitting edema     (  ) non-pitting edema   (  ) ulceration     (  ) gangrene:     (location:     )    NERVOUS SYSTEM:    (  ) A&Ox3     (  ) confused     (  ) lethargic  CN II-XII:     (  ) Intact     (  ) deficits found     (Specify:     )   Upper extremities:     (  ) no sensorimotor deficits     (  ) weakness     (  ) loss of proprioception/vibration     (  ) loss of touch/temperature (specify:    )  Lower extremities:     (  ) no sensorimotor deficits     (  ) weakness     (  ) loss of proprioception/vibration     (  ) loss of touch/temperature (specify:    )    SKIN:   (  ) No rashes or lesions     (  ) maculopapular rash     (  ) pustules     (  ) vesicles     (  ) ulcer     (  ) ecchymosis     (specify location:     )    AMPAC score:    (  ) Indwelling Madsen Catheter:   Date insterted:    Reason (  ) Critical illness     (  ) urinary retention    (  ) Accurate Ins/Outs Monitoring     (  ) CMO patient    (  ) Central Line:   Date inserted:  Location: (  ) Right IJ     (  ) Left IJ     (  ) Right Fem     (  ) Left Fem    (  ) SPC        (  ) pigtail       (  ) PEG tube       (  ) colostomy       (  ) jejunostomy  (  ) U-Dall    LABS:                        8.6    7.44  )-----------( 297      ( 06 Aug 2023 04:43 )             27.9     08-06    141  |  105  |  8<L>  ----------------------------<  102<H>  4.0   |  28  |  0.8    Ca    8.2<L>      06 Aug 2023 04:43  Phos  2.9     08-06  Mg     2.0     08-06    TPro  5.4<L>  /  Alb  3.0<L>  /  TBili  <0.2  /  DBili  x   /  AST  19  /  ALT  12  /  AlkPhos  72  08-06      Urinalysis Basic - ( 06 Aug 2023 04:43 )    Color: x / Appearance: x / SG: x / pH: x  Gluc: 102 mg/dL / Ketone: x  / Bili: x / Urobili: x   Blood: x / Protein: x / Nitrite: x   Leuk Esterase: x / RBC: x / WBC x   Sq Epi: x / Non Sq Epi: x / Bacteria: x            Culture - Urine (collected 04 Aug 2023 02:08)  Source: Clean Catch Clean Catch (Midstream)  Preliminary Report (06 Aug 2023 12:31):    >100,000 CFU/ml Escherichia coli    Culture - Blood (collected 04 Aug 2023 01:05)  Source: .Blood Blood-Peripheral  Preliminary Report (06 Aug 2023 09:01):    No growth at 48 Hours    Culture - Blood (collected 04 Aug 2023 01:05)  Source: .Blood Blood-Peripheral  Preliminary Report (06 Aug 2023 09:01):    No growth at 48 Hours      CARDIAC MARKERS ( 04 Aug 2023 16:13 )  x     / <0.01 ng/mL / x     / x     / x          RADIOLOGY:           24H events:    Patient is a 58y old Female who presents with a chief complaint of Coffee ground emesis and hypoxia (05 Aug 2023 07:27)  Primary diagnosis of Sepsis due to urinary tract infection  Today is hospital day 2d. This morning patient was seen and examined at bedside, resting comfortably in bed.        Code Status:    Family communication:  Contact date:  Name of person contacted:  Relationship to patient:  Communication details:  What matters most:    PAST MEDICAL & SURGICAL HISTORY  Down syndrome    Osteoporosis    Mild anemia    Neuropathy    S/P debridement  of R hip on 3/2/21      SOCIAL HISTORY:  Social History:      ALLERGIES:  No Known Allergies    MEDICATIONS:  STANDING MEDICATIONS  azithromycin  IVPB      azithromycin  IVPB 500 milliGRAM(s) IV Intermittent every 24 hours  cefTRIAXone   IVPB 1000 milliGRAM(s) IV Intermittent every 24 hours  chlorhexidine 2% Cloths 1 Application(s) Topical daily  gabapentin 600 milliGRAM(s) Oral daily  melatonin 3 milliGRAM(s) Oral at bedtime  midodrine 10 milliGRAM(s) Oral every 8 hours  multivitamin 1 Tablet(s) Oral daily  pantoprazole  Injectable 40 milliGRAM(s) IV Push every 12 hours  polyethylene glycol 3350 17 Gram(s) Oral daily  raloxifene 60 milliGRAM(s) Oral daily  senna 2 Tablet(s) Oral at bedtime    PRN MEDICATIONS    VITALS:   T(F): 98.1  HR: 97  BP: 100/57  RR: 22  SpO2: 97%    PHYSICAL EXAM:  GENERAL: NAD, no signs of respiratory distress  HEAD:  Atraumatic, Normocephalic  NECK: Supple, No JVD  CHEST/LUNG: Clear to auscultation bilaterally; No wheeze; No crackles; No accessory muscles used  HEART: Regular rate and rhythm; No murmurs;   ABDOMEN: Soft, Nontender, Nondistended; Bowel sounds present; No guarding  EXTREMITIES:  2+ Peripheral Pulses, No cyanosis or edema  PSYCH: Non verbal   NEUROLOGY: non-focal              AMPA score:    (  ) Indwelling Madsen Catheter:   Date insterted:    Reason (  ) Critical illness     (  ) urinary retention    (  ) Accurate Ins/Outs Monitoring     (  ) CMO patient    (  ) Central Line:   Date inserted:  Location: (  ) Right IJ     (  ) Left IJ     (  ) Right Fem     (  ) Left Fem    (  ) SPC        (  ) pigtail       (  ) PEG tube       (  ) colostomy       (  ) jejunostomy  (  ) U-Dall    LABS:                        8.6    7.44  )-----------( 297      ( 06 Aug 2023 04:43 )             27.9     08-06    141  |  105  |  8<L>  ----------------------------<  102<H>  4.0   |  28  |  0.8    Ca    8.2<L>      06 Aug 2023 04:43  Phos  2.9     08-06  Mg     2.0     08-06    TPro  5.4<L>  /  Alb  3.0<L>  /  TBili  <0.2  /  DBili  x   /  AST  19  /  ALT  12  /  AlkPhos  72  08-06      Urinalysis Basic - ( 06 Aug 2023 04:43 )    Color: x / Appearance: x / SG: x / pH: x  Gluc: 102 mg/dL / Ketone: x  / Bili: x / Urobili: x   Blood: x / Protein: x / Nitrite: x   Leuk Esterase: x / RBC: x / WBC x   Sq Epi: x / Non Sq Epi: x / Bacteria: x            Culture - Urine (collected 04 Aug 2023 02:08)  Source: Clean Catch Clean Catch (Midstream)  Preliminary Report (06 Aug 2023 12:31):    >100,000 CFU/ml Escherichia coli    Culture - Blood (collected 04 Aug 2023 01:05)  Source: .Blood Blood-Peripheral  Preliminary Report (06 Aug 2023 09:01):    No growth at 48 Hours    Culture - Blood (collected 04 Aug 2023 01:05)  Source: .Blood Blood-Peripheral  Preliminary Report (06 Aug 2023 09:01):    No growth at 48 Hours      CARDIAC MARKERS ( 04 Aug 2023 16:13 )  x     / <0.01 ng/mL / x     / x     / x          RADIOLOGY:

## 2023-08-07 LAB
-  AMIKACIN: SIGNIFICANT CHANGE UP
-  AMOXICILLIN/CLAVULANIC ACID: SIGNIFICANT CHANGE UP
-  AMPICILLIN/SULBACTAM: SIGNIFICANT CHANGE UP
-  AMPICILLIN: SIGNIFICANT CHANGE UP
-  AZTREONAM: SIGNIFICANT CHANGE UP
-  CEFAZOLIN: SIGNIFICANT CHANGE UP
-  CEFEPIME: SIGNIFICANT CHANGE UP
-  CEFTRIAXONE: SIGNIFICANT CHANGE UP
-  CEFUROXIME: SIGNIFICANT CHANGE UP
-  CIPROFLOXACIN: SIGNIFICANT CHANGE UP
-  ERTAPENEM: SIGNIFICANT CHANGE UP
-  GENTAMICIN: SIGNIFICANT CHANGE UP
-  IMIPENEM: SIGNIFICANT CHANGE UP
-  LEVOFLOXACIN: SIGNIFICANT CHANGE UP
-  MEROPENEM: SIGNIFICANT CHANGE UP
-  NITROFURANTOIN: SIGNIFICANT CHANGE UP
-  PIPERACILLIN/TAZOBACTAM: SIGNIFICANT CHANGE UP
-  TOBRAMYCIN: SIGNIFICANT CHANGE UP
-  TRIMETHOPRIM/SULFAMETHOXAZOLE: SIGNIFICANT CHANGE UP
ALBUMIN SERPL ELPH-MCNC: 3.2 G/DL — LOW (ref 3.5–5.2)
ALP SERPL-CCNC: 80 U/L — SIGNIFICANT CHANGE UP (ref 30–115)
ALT FLD-CCNC: 12 U/L — SIGNIFICANT CHANGE UP (ref 0–41)
ANION GAP SERPL CALC-SCNC: 10 MMOL/L — SIGNIFICANT CHANGE UP (ref 7–14)
AST SERPL-CCNC: 18 U/L — SIGNIFICANT CHANGE UP (ref 0–41)
BASOPHILS # BLD AUTO: 0.07 K/UL — SIGNIFICANT CHANGE UP (ref 0–0.2)
BASOPHILS NFR BLD AUTO: 1 % — SIGNIFICANT CHANGE UP (ref 0–1)
BILIRUB SERPL-MCNC: <0.2 MG/DL — SIGNIFICANT CHANGE UP (ref 0.2–1.2)
BUN SERPL-MCNC: 8 MG/DL — LOW (ref 10–20)
CALCIUM SERPL-MCNC: 8.6 MG/DL — SIGNIFICANT CHANGE UP (ref 8.4–10.5)
CHLORIDE SERPL-SCNC: 104 MMOL/L — SIGNIFICANT CHANGE UP (ref 98–110)
CO2 SERPL-SCNC: 28 MMOL/L — SIGNIFICANT CHANGE UP (ref 17–32)
CREAT SERPL-MCNC: 0.8 MG/DL — SIGNIFICANT CHANGE UP (ref 0.7–1.5)
CULTURE RESULTS: SIGNIFICANT CHANGE UP
EGFR: 86 ML/MIN/1.73M2 — SIGNIFICANT CHANGE UP
EOSINOPHIL # BLD AUTO: 0.25 K/UL — SIGNIFICANT CHANGE UP (ref 0–0.7)
EOSINOPHIL NFR BLD AUTO: 3.7 % — SIGNIFICANT CHANGE UP (ref 0–8)
GLUCOSE SERPL-MCNC: 99 MG/DL — SIGNIFICANT CHANGE UP (ref 70–99)
HCT VFR BLD CALC: 30.1 % — LOW (ref 37–47)
HGB BLD-MCNC: 9.3 G/DL — LOW (ref 12–16)
IMM GRANULOCYTES NFR BLD AUTO: 0.3 % — SIGNIFICANT CHANGE UP (ref 0.1–0.3)
LYMPHOCYTES # BLD AUTO: 1.59 K/UL — SIGNIFICANT CHANGE UP (ref 1.2–3.4)
LYMPHOCYTES # BLD AUTO: 23.7 % — SIGNIFICANT CHANGE UP (ref 20.5–51.1)
MAGNESIUM SERPL-MCNC: 2.1 MG/DL — SIGNIFICANT CHANGE UP (ref 1.8–2.4)
MCHC RBC-ENTMCNC: 25.5 PG — LOW (ref 27–31)
MCHC RBC-ENTMCNC: 30.9 G/DL — LOW (ref 32–37)
MCV RBC AUTO: 82.7 FL — SIGNIFICANT CHANGE UP (ref 81–99)
METHOD TYPE: SIGNIFICANT CHANGE UP
MONOCYTES # BLD AUTO: 0.43 K/UL — SIGNIFICANT CHANGE UP (ref 0.1–0.6)
MONOCYTES NFR BLD AUTO: 6.4 % — SIGNIFICANT CHANGE UP (ref 1.7–9.3)
NEUTROPHILS # BLD AUTO: 4.34 K/UL — SIGNIFICANT CHANGE UP (ref 1.4–6.5)
NEUTROPHILS NFR BLD AUTO: 64.9 % — SIGNIFICANT CHANGE UP (ref 42.2–75.2)
NRBC # BLD: 0 /100 WBCS — SIGNIFICANT CHANGE UP (ref 0–0)
ORGANISM # SPEC MICROSCOPIC CNT: SIGNIFICANT CHANGE UP
ORGANISM # SPEC MICROSCOPIC CNT: SIGNIFICANT CHANGE UP
PHOSPHATE SERPL-MCNC: 3.1 MG/DL — SIGNIFICANT CHANGE UP (ref 2.1–4.9)
PLATELET # BLD AUTO: 344 K/UL — SIGNIFICANT CHANGE UP (ref 130–400)
PMV BLD: 9.8 FL — SIGNIFICANT CHANGE UP (ref 7.4–10.4)
POTASSIUM SERPL-MCNC: 4.3 MMOL/L — SIGNIFICANT CHANGE UP (ref 3.5–5)
POTASSIUM SERPL-SCNC: 4.3 MMOL/L — SIGNIFICANT CHANGE UP (ref 3.5–5)
PROT SERPL-MCNC: 5.9 G/DL — LOW (ref 6–8)
RBC # BLD: 3.64 M/UL — LOW (ref 4.2–5.4)
RBC # FLD: 15.3 % — HIGH (ref 11.5–14.5)
SODIUM SERPL-SCNC: 142 MMOL/L — SIGNIFICANT CHANGE UP (ref 135–146)
SPECIMEN SOURCE: SIGNIFICANT CHANGE UP
WBC # BLD: 6.7 K/UL — SIGNIFICANT CHANGE UP (ref 4.8–10.8)
WBC # FLD AUTO: 6.7 K/UL — SIGNIFICANT CHANGE UP (ref 4.8–10.8)

## 2023-08-07 PROCEDURE — 93306 TTE W/DOPPLER COMPLETE: CPT | Mod: 26

## 2023-08-07 PROCEDURE — 99232 SBSQ HOSP IP/OBS MODERATE 35: CPT

## 2023-08-07 RX ORDER — PANTOPRAZOLE SODIUM 20 MG/1
40 TABLET, DELAYED RELEASE ORAL
Refills: 0 | Status: DISCONTINUED | OUTPATIENT
Start: 2023-08-07 | End: 2023-08-11

## 2023-08-07 RX ORDER — ENOXAPARIN SODIUM 100 MG/ML
40 INJECTION SUBCUTANEOUS EVERY 24 HOURS
Refills: 0 | Status: DISCONTINUED | OUTPATIENT
Start: 2023-08-07 | End: 2023-08-11

## 2023-08-07 RX ADMIN — Medication 3 MILLIGRAM(S): at 21:05

## 2023-08-07 RX ADMIN — POLYETHYLENE GLYCOL 3350 17 GRAM(S): 17 POWDER, FOR SOLUTION ORAL at 11:00

## 2023-08-07 RX ADMIN — RALOXIFENE HYDROCHLORIDE 60 MILLIGRAM(S): 60 TABLET, COATED ORAL at 11:00

## 2023-08-07 RX ADMIN — PANTOPRAZOLE SODIUM 40 MILLIGRAM(S): 20 TABLET, DELAYED RELEASE ORAL at 06:06

## 2023-08-07 RX ADMIN — CEFTRIAXONE 100 MILLIGRAM(S): 500 INJECTION, POWDER, FOR SOLUTION INTRAMUSCULAR; INTRAVENOUS at 11:01

## 2023-08-07 RX ADMIN — AZITHROMYCIN 255 MILLIGRAM(S): 500 TABLET, FILM COATED ORAL at 12:28

## 2023-08-07 RX ADMIN — MIDODRINE HYDROCHLORIDE 10 MILLIGRAM(S): 2.5 TABLET ORAL at 06:06

## 2023-08-07 RX ADMIN — CHLORHEXIDINE GLUCONATE 1 APPLICATION(S): 213 SOLUTION TOPICAL at 11:01

## 2023-08-07 RX ADMIN — MIDODRINE HYDROCHLORIDE 10 MILLIGRAM(S): 2.5 TABLET ORAL at 21:05

## 2023-08-07 RX ADMIN — PANTOPRAZOLE SODIUM 40 MILLIGRAM(S): 20 TABLET, DELAYED RELEASE ORAL at 17:27

## 2023-08-07 RX ADMIN — ENOXAPARIN SODIUM 40 MILLIGRAM(S): 100 INJECTION SUBCUTANEOUS at 17:27

## 2023-08-07 RX ADMIN — SENNA PLUS 2 TABLET(S): 8.6 TABLET ORAL at 21:05

## 2023-08-07 RX ADMIN — MIDODRINE HYDROCHLORIDE 10 MILLIGRAM(S): 2.5 TABLET ORAL at 14:21

## 2023-08-07 RX ADMIN — Medication 1 TABLET(S): at 11:00

## 2023-08-07 RX ADMIN — GABAPENTIN 600 MILLIGRAM(S): 400 CAPSULE ORAL at 11:00

## 2023-08-07 NOTE — SWALLOW BEDSIDE ASSESSMENT ADULT - SWALLOW EVAL: DIAGNOSIS
+toleration for puree w/o overt s/s aspiration/penetration; overt s/s aspiration/penetration across all liquid consistencies
+toleration for puree w/o overt s/s aspiration/penetration; +improved toleration for thin liquids w/o overt s/s aspiration/penetration
Pt presents w/ no overt clinical s/s of aspiration/penetration w/ puree and thin liquids.

## 2023-08-07 NOTE — PROGRESS NOTE ADULT - ASSESSMENT
59yo F w/a PMH of Down syndrome, nonverbal at baseline, hypothyroidism, cerebral palsy, congenital pulmonary stenosis brought in by EMS from [facility] for 1 episode of coffee-ground emesis and hypoxia just prior to arrival.  Staff from [facility] report patient has had only 1 episode of vomiting but has otherwise been well.  On EMS arrival patient was noted to be satting in the 70%s  on room air but improved to 96% on 2 L nasal cannula.  No other reported recent cough, vomiting, fever, chills, rash.    58 years old female referred for [facility] with hematemsis and desaturation., known Down syn, hypothyroid, asphasic, recurrent infections/abscesses, osteoporosis, PS.     #Hematemsis (CTAP showed hiatal hernia, reactive LNs)  Pantoprazole IV 40mg bid - switch to PO   GI consulted:   start clear liquid diet and advance slowly as tolerated   PPI BID IV while on pressors, can switch to PO once off pressors  monitor H/H with active type and screen   Avoid NSAIDs  will benefit from EGD that can be done as outpatient    #Hypoxia 2/2 multifocal pneumonia   MRSA and RVP negative  -nasal cannula, sat well   c/w Ceftriaxone and Azithromycin    #Urinary Tract infection  on Ceftriaxone   - Urine Cx = >100k E. Coli f/u sensitivities    and blood cultures - NGTD    #Down syndrome  #Congential P stenosis  TTE:  1. Suboptimal and very limited study.   2. Left ventricular ejection fraction, by visual estimation, is >55%.   3. Normal global left ventricular systolic function.   4. The left ventricular diastolic function could not be assessed in this   study.   5. Trivial pericardial effusion.      #Osteoporosis  -Raloxifene     #Peripheral neuropathy  -Gapapentin    #Hypothyroidism  TSH - 1.63    DVT PPx Lovenox

## 2023-08-07 NOTE — PROGRESS NOTE ADULT - SUBJECTIVE AND OBJECTIVE BOX
JERICHO TEA  58y, Female  Allergy: No Known Allergies    Hospital Day: 3d    Patient seen and examined earlier today. signs of bleeding.  resting comfortably.  No acute events overnight.      PMH/PSH:  PAST MEDICAL & SURGICAL HISTORY:  Down syndrome      Osteoporosis      Mild anemia      Neuropathy      S/P debridement  of R hip on 3/2/21          LAST 24-Hr EVENTS:    VITALS:  T(F): 98.9 (08-07-23 @ 13:55), Max: 98.9 (08-07-23 @ 13:55)  HR: 99 (08-07-23 @ 13:55)  BP: 111/60 (08-07-23 @ 13:55) (102/70 - 111/60)  RR: 18 (08-07-23 @ 13:55)  SpO2: --          TESTS & MEASUREMENTS:  Weight/BMI  50.9 (08-04-23 @ 09:15)  25.2 (08-04-23 @ 09:15)    08-05-23 @ 07:01  -  08-06-23 @ 07:00  --------------------------------------------------------  IN: 950 mL / OUT: 2250 mL / NET: -1300 mL    08-06-23 @ 07:01  -  08-07-23 @ 07:00  --------------------------------------------------------  IN: 800 mL / OUT: 0 mL / NET: 800 mL                            9.3    6.70  )-----------( 344      ( 07 Aug 2023 07:39 )             30.1       INR: 1.02 ratio (08-04-23 @ 01:05)    08-07    142  |  104  |  8<L>  ----------------------------<  99  4.3   |  28  |  0.8    Ca    8.6      07 Aug 2023 07:39  Phos  3.1     08-07  Mg     2.1     08-07    TPro  5.9<L>  /  Alb  3.2<L>  /  TBili  <0.2  /  DBili  x   /  AST  18  /  ALT  12  /  AlkPhos  80  08-07    LIVER FUNCTIONS - ( 07 Aug 2023 07:39 )  Alb: 3.2 g/dL / Pro: 5.9 g/dL / ALK PHOS: 80 U/L / ALT: 12 U/L / AST: 18 U/L / GGT: x                 Culture - Urine (collected 08-04-23 @ 02:08)  Source: Clean Catch Clean Catch (Midstream)  Preliminary Report (08-06-23 @ 12:31):    >100,000 CFU/ml Escherichia coli    Culture - Blood (collected 08-04-23 @ 01:05)  Source: .Blood Blood-Peripheral  Preliminary Report (08-07-23 @ 09:01):    No growth at 72 Hours    Culture - Blood (collected 08-04-23 @ 01:05)  Source: .Blood Blood-Peripheral  Preliminary Report (08-07-23 @ 09:01):    No growth at 72 Hours      Urinalysis Basic - ( 07 Aug 2023 07:39 )    Color: x / Appearance: x / SG: x / pH: x  Gluc: 99 mg/dL / Ketone: x  / Bili: x / Urobili: x   Blood: x / Protein: x / Nitrite: x   Leuk Esterase: x / RBC: x / WBC x   Sq Epi: x / Non Sq Epi: x / Bacteria: x      Procalcitonin, Serum: 7.82 ng/mL (08-04-23 @ 16:13)    D-Dimer Assay, Quantitative: 325 ng/mL DDU (08-05-23 @ 12:07)                      RADIOLOGY, ECG, & ADDITIONAL TESTS:      RECENT DIAGNOSTIC ORDERS:  Comprehensive Metabolic Panel: AM Sched. Collection: 08-Aug-2023 04:30 (08-07-23 @ 15:01)  Complete Blood Count + Automated Diff: AM Sched. Collection: 08-Aug-2023 04:30 (08-07-23 @ 15:01)      MEDICATIONS:  MEDICATIONS  (STANDING):  azithromycin  IVPB      azithromycin  IVPB 500 milliGRAM(s) IV Intermittent every 24 hours  cefTRIAXone   IVPB 1000 milliGRAM(s) IV Intermittent every 24 hours  chlorhexidine 2% Cloths 1 Application(s) Topical daily  gabapentin 600 milliGRAM(s) Oral daily  melatonin 3 milliGRAM(s) Oral at bedtime  midodrine 10 milliGRAM(s) Oral every 8 hours  multivitamin 1 Tablet(s) Oral daily  pantoprazole    Tablet 40 milliGRAM(s) Oral two times a day  polyethylene glycol 3350 17 Gram(s) Oral daily  raloxifene 60 milliGRAM(s) Oral daily  senna 2 Tablet(s) Oral at bedtime    MEDICATIONS  (PRN):      HOME MEDICATIONS:  gabapentin 600 mg oral tablet (08-04)  melatonin 5 mg oral tablet (08-04)  midodrine 10 mg oral tablet (08-04)  Multiple Vitamins oral tablet (08-04)  raloxifene 60 mg oral tablet (08-04)      PHYSICAL EXAM:  GENERAL: NAD  HEAD:  Atraumatic, Normocephalic  EYES: EOMI, conjunctiva and sclera clear  NECK: Supple  CHEST/LUNG: Non labored respirations  HEART: regular rate and rhythm, murmur+(functional?)  ABDOMEN: Soft, Nondistended   EXTREMITIES:  No clubbing, cyanosis, or edema  PSYCH: Nonverbal   SKIN: No rashes or lesions

## 2023-08-07 NOTE — SWALLOW BEDSIDE ASSESSMENT ADULT - SWALLOW EVAL: FEEDING ASSISTANCE
1:1 feed for assistance, supervision, pacing/frequent cues/help required
1:1 feed for assistance, supervision, pacing/frequent cues/help required
1:1 feed for assistance and supervision/frequent cues/help required

## 2023-08-07 NOTE — SWALLOW BEDSIDE ASSESSMENT ADULT - SLP PERTINENT HISTORY OF CURRENT PROBLEM
Pt is a 59 y/o F w/ PMHx: Down syndrome, nonverbal at baseline, hypothyroidism, cerebral palsy, congenital pulmonary stenosis BIBEMS from [facility] for 1 episode of coffee-ground emesis and hypoxia. Pt is being treated for hematemsis, hypoxia, infection (mostly d/t aspiration pna). Chest CT-> b/l bronchial wall thickening w/ lower lobe airway narrowing and b/l patchy airspace opacities w/ more consolidative change in the L hilar and lower lobe region. Findings may be infectious/inflammatory. Correlate for multifocal pna; Moderate size hiatal hernia and wall thickening of the mid to lower esophagus. Findings may reflect esophagitis.
Pt is a 59 y/o F w/ PMHx: Down syndrome, nonverbal at baseline, hypothyroidism, cerebral palsy, congenital pulmonary stenosis BIBEMS from [facility] for 1 episode of coffee-ground emesis and hypoxia. Pt is being treated for hematemsis, hypoxia, infection (mostly d/t aspiration pna). Chest CT-> b/l bronchial wall thickening w/ lower lobe airway narrowing and b/l patchy airspace opacities w/ more consolidative change in the L hilar and lower lobe region. Findings may be infectious/inflammatory. Correlate for multifocal pna; Moderate size hiatal hernia and wall thickening of the mid to lower esophagus. Findings may reflect esophagitis. Pt for downgrade to medical floor.
Pt is a 59 y/o F w/ PMHx: Down syndrome, nonverbal at baseline, hypothyroidism, cerebral palsy, congenital pulmonary stenosis BIBEMS from [facility] for 1 episode of coffee-ground emesis and hypoxia. Pt is being treated for hematemsis, hypoxia, infection (mostly d/t aspiration pna). Chest CT-> b/l bronchial wall thickening w/ lower lobe airway narrowing and b/l patchy airspace opacities w/ more consolidative change in the L hilar and lower lobe region. Findings may be infectious/inflammatory. Correlate for multifocal pna; Moderate size hiatal hernia and wall thickening of the mid to lower esophagus. Findings may reflect esophagitis. Pt for downgrade to medical floor.

## 2023-08-07 NOTE — SWALLOW BEDSIDE ASSESSMENT ADULT - COMMENTS
Pt known to SLP dept. from 4/2022 w/ [facility] recs for puree, thin liquids. No laryngeal penetration/aspiration during study.

## 2023-08-07 NOTE — SWALLOW BEDSIDE ASSESSMENT ADULT - DIET PRIOR TO ADMI
puree, thin liquids per [facility] aide at bedside

## 2023-08-08 LAB
ALBUMIN SERPL ELPH-MCNC: 3.1 G/DL — LOW (ref 3.5–5.2)
ALP SERPL-CCNC: 80 U/L — SIGNIFICANT CHANGE UP (ref 30–115)
ALT FLD-CCNC: 11 U/L — SIGNIFICANT CHANGE UP (ref 0–41)
ANION GAP SERPL CALC-SCNC: 10 MMOL/L — SIGNIFICANT CHANGE UP (ref 7–14)
AST SERPL-CCNC: 14 U/L — SIGNIFICANT CHANGE UP (ref 0–41)
BASOPHILS # BLD AUTO: 0.07 K/UL — SIGNIFICANT CHANGE UP (ref 0–0.2)
BASOPHILS NFR BLD AUTO: 1.1 % — HIGH (ref 0–1)
BILIRUB SERPL-MCNC: <0.2 MG/DL — SIGNIFICANT CHANGE UP (ref 0.2–1.2)
BUN SERPL-MCNC: 13 MG/DL — SIGNIFICANT CHANGE UP (ref 10–20)
CALCIUM SERPL-MCNC: 8.4 MG/DL — SIGNIFICANT CHANGE UP (ref 8.4–10.5)
CHLORIDE SERPL-SCNC: 101 MMOL/L — SIGNIFICANT CHANGE UP (ref 98–110)
CO2 SERPL-SCNC: 27 MMOL/L — SIGNIFICANT CHANGE UP (ref 17–32)
CREAT SERPL-MCNC: 0.8 MG/DL — SIGNIFICANT CHANGE UP (ref 0.7–1.5)
EGFR: 86 ML/MIN/1.73M2 — SIGNIFICANT CHANGE UP
EOSINOPHIL # BLD AUTO: 0.27 K/UL — SIGNIFICANT CHANGE UP (ref 0–0.7)
EOSINOPHIL NFR BLD AUTO: 4.3 % — SIGNIFICANT CHANGE UP (ref 0–8)
GLUCOSE SERPL-MCNC: 110 MG/DL — HIGH (ref 70–99)
HCT VFR BLD CALC: 29.5 % — LOW (ref 37–47)
HGB BLD-MCNC: 9.4 G/DL — LOW (ref 12–16)
IMM GRANULOCYTES NFR BLD AUTO: 0.5 % — HIGH (ref 0.1–0.3)
LYMPHOCYTES # BLD AUTO: 1.8 K/UL — SIGNIFICANT CHANGE UP (ref 1.2–3.4)
LYMPHOCYTES # BLD AUTO: 29 % — SIGNIFICANT CHANGE UP (ref 20.5–51.1)
MAGNESIUM SERPL-MCNC: 2.1 MG/DL — SIGNIFICANT CHANGE UP (ref 1.8–2.4)
MCHC RBC-ENTMCNC: 26 PG — LOW (ref 27–31)
MCHC RBC-ENTMCNC: 31.9 G/DL — LOW (ref 32–37)
MCV RBC AUTO: 81.5 FL — SIGNIFICANT CHANGE UP (ref 81–99)
MONOCYTES # BLD AUTO: 0.46 K/UL — SIGNIFICANT CHANGE UP (ref 0.1–0.6)
MONOCYTES NFR BLD AUTO: 7.4 % — SIGNIFICANT CHANGE UP (ref 1.7–9.3)
NEUTROPHILS # BLD AUTO: 3.58 K/UL — SIGNIFICANT CHANGE UP (ref 1.4–6.5)
NEUTROPHILS NFR BLD AUTO: 57.7 % — SIGNIFICANT CHANGE UP (ref 42.2–75.2)
NRBC # BLD: 0 /100 WBCS — SIGNIFICANT CHANGE UP (ref 0–0)
PHOSPHATE SERPL-MCNC: 3.6 MG/DL — SIGNIFICANT CHANGE UP (ref 2.1–4.9)
PLATELET # BLD AUTO: 352 K/UL — SIGNIFICANT CHANGE UP (ref 130–400)
PMV BLD: 9.7 FL — SIGNIFICANT CHANGE UP (ref 7.4–10.4)
POTASSIUM SERPL-MCNC: 4.2 MMOL/L — SIGNIFICANT CHANGE UP (ref 3.5–5)
POTASSIUM SERPL-SCNC: 4.2 MMOL/L — SIGNIFICANT CHANGE UP (ref 3.5–5)
PROT SERPL-MCNC: 5.9 G/DL — LOW (ref 6–8)
RBC # BLD: 3.62 M/UL — LOW (ref 4.2–5.4)
RBC # FLD: 15.4 % — HIGH (ref 11.5–14.5)
SODIUM SERPL-SCNC: 138 MMOL/L — SIGNIFICANT CHANGE UP (ref 135–146)
WBC # BLD: 6.21 K/UL — SIGNIFICANT CHANGE UP (ref 4.8–10.8)
WBC # FLD AUTO: 6.21 K/UL — SIGNIFICANT CHANGE UP (ref 4.8–10.8)

## 2023-08-08 PROCEDURE — 93010 ELECTROCARDIOGRAM REPORT: CPT

## 2023-08-08 PROCEDURE — 99232 SBSQ HOSP IP/OBS MODERATE 35: CPT

## 2023-08-08 RX ADMIN — AZITHROMYCIN 255 MILLIGRAM(S): 500 TABLET, FILM COATED ORAL at 09:31

## 2023-08-08 RX ADMIN — Medication 3 MILLIGRAM(S): at 21:56

## 2023-08-08 RX ADMIN — PANTOPRAZOLE SODIUM 40 MILLIGRAM(S): 20 TABLET, DELAYED RELEASE ORAL at 05:13

## 2023-08-08 RX ADMIN — ENOXAPARIN SODIUM 40 MILLIGRAM(S): 100 INJECTION SUBCUTANEOUS at 18:00

## 2023-08-08 RX ADMIN — SENNA PLUS 2 TABLET(S): 8.6 TABLET ORAL at 21:57

## 2023-08-08 RX ADMIN — MIDODRINE HYDROCHLORIDE 10 MILLIGRAM(S): 2.5 TABLET ORAL at 21:56

## 2023-08-08 RX ADMIN — MIDODRINE HYDROCHLORIDE 10 MILLIGRAM(S): 2.5 TABLET ORAL at 15:08

## 2023-08-08 RX ADMIN — POLYETHYLENE GLYCOL 3350 17 GRAM(S): 17 POWDER, FOR SOLUTION ORAL at 11:30

## 2023-08-08 RX ADMIN — RALOXIFENE HYDROCHLORIDE 60 MILLIGRAM(S): 60 TABLET, COATED ORAL at 11:29

## 2023-08-08 RX ADMIN — CHLORHEXIDINE GLUCONATE 1 APPLICATION(S): 213 SOLUTION TOPICAL at 11:22

## 2023-08-08 RX ADMIN — MIDODRINE HYDROCHLORIDE 10 MILLIGRAM(S): 2.5 TABLET ORAL at 05:14

## 2023-08-08 RX ADMIN — Medication 1 TABLET(S): at 11:30

## 2023-08-08 RX ADMIN — CEFTRIAXONE 100 MILLIGRAM(S): 500 INJECTION, POWDER, FOR SOLUTION INTRAMUSCULAR; INTRAVENOUS at 08:46

## 2023-08-08 RX ADMIN — GABAPENTIN 600 MILLIGRAM(S): 400 CAPSULE ORAL at 11:30

## 2023-08-08 NOTE — PROGRESS NOTE ADULT - SUBJECTIVE AND OBJECTIVE BOX
TEA ECHAVARRIA 58y Female  MRN#: [id number]   Hospital Day: 4d    SUBJECTIVE  57yo F w/a PMH of Down syndrome, nonverbal at baseline, hypothyroidism, cerebral palsy, congenital pulmonary stenosis brought in by EMS from [facility] for 1 episode of coffee-ground emesis and hypoxia just prior to arrival.  Staff from [facility] report patient has had only 1 episode of vomiting but has otherwise been well.  On EMS arrival patient was noted to be satting in the 70%s  on room air but improved to 96% on 2 L nasal cannula.  No other reported recent cough, vomiting, fever, chills, rash.    In the ED,   Vitals - BP 96173, , RR 17, T 97.8, satting 70% on RA,    Labs - WBC 16.98, Hgb 11.6, , Na 141, K 5.2 (Hemolyzed), BUN 24, Cr 1.1, Trop <.01, UA +  Imaging -  CT Chest and CTAP   - Bilateral bronchial wall thickening with lower lobe airway narrowing and bilateral patchy airspace opacities with more consolidative change in the left hilar and lower lobe region. Findings may be infectious/inflammatory. Correlate for multifocal pneumonia.  - Mildly enlarged mediastinal lymph nodes, nonspecific, possibly reactive. Follow-up posttreatment is recommended to ensure resolution of pulmonary findings in lymphadenopathy as the possibility of underlying neoplasm is not excluded.  - Moderate size hiatal hernia and wall thickening of the mid to lower esophagus. Findings may reflect esophagitis. Nonemergent direct visualization can be obtained for further evaluation.  - Urinary bladder appears mildly wall thickening. Correlate with urinalysis to exclude cystitis.  - Hepatic steatosis.    UA grossly positive in the ED. Hb found to be stable. Hypotensive and started on norepinephrine.    Admitted to the ICU for UGIB and Septic Shock 2/2 UTI     Patient is a 58y old Female who presents with a chief complaint of Coffee ground emesis and hypoxia (07 Aug 2023 15:11)  Currently admitted to medicine with the primary diagnosis of Sepsis due to urinary tract infection      INTERVAL HPI AND OVERNIGHT EVENTS:  Patient was examined and seen at bedside. This morning she is resting comfortably in bed and reports no issues or overnight events.    OBJECTIVE  PAST MEDICAL & SURGICAL HISTORY  Down syndrome    Osteoporosis    Mild anemia    Neuropathy    S/P debridement  of R hip on 3/2/21      ALLERGIES:  No Known Allergies    MEDICATIONS:  STANDING MEDICATIONS  azithromycin  IVPB      azithromycin  IVPB 500 milliGRAM(s) IV Intermittent every 24 hours  chlorhexidine 2% Cloths 1 Application(s) Topical daily  enoxaparin Injectable 40 milliGRAM(s) SubCutaneous every 24 hours  gabapentin 600 milliGRAM(s) Oral daily  melatonin 3 milliGRAM(s) Oral at bedtime  midodrine 10 milliGRAM(s) Oral every 8 hours  multivitamin 1 Tablet(s) Oral daily  pantoprazole    Tablet 40 milliGRAM(s) Oral two times a day  polyethylene glycol 3350 17 Gram(s) Oral daily  raloxifene 60 milliGRAM(s) Oral daily  senna 2 Tablet(s) Oral at bedtime    PRN MEDICATIONS      VITAL SIGNS: Last 24 Hours  T(C): 36.2 (08 Aug 2023 13:18), Max: 36.3 (07 Aug 2023 20:08)  T(F): 97.1 (08 Aug 2023 13:18), Max: 97.4 (07 Aug 2023 20:08)  HR: 108 (08 Aug 2023 13:18) (90 - 108)  BP: 104/62 (08 Aug 2023 13:18) (104/62 - 110/65)  BP(mean): --  RR: 18 (08 Aug 2023 13:18) (18 - 18)  SpO2: 99% (08 Aug 2023 05:22) (99% - 99%)    LABS:                        9.4    6.21  )-----------( 352      ( 08 Aug 2023 06:33 )             29.5     08-08    138  |  101  |  13  ----------------------------<  110<H>  4.2   |  27  |  0.8    Ca    8.4      08 Aug 2023 06:33  Phos  3.6     08-08  Mg     2.1     08-08    TPro  5.9<L>  /  Alb  3.1<L>  /  TBili  <0.2  /  DBili  x   /  AST  14  /  ALT  11  /  AlkPhos  80  08-08      Urinalysis Basic - ( 08 Aug 2023 06:33 )    Color: x / Appearance: x / SG: x / pH: x  Gluc: 110 mg/dL / Ketone: x  / Bili: x / Urobili: x   Blood: x / Protein: x / Nitrite: x   Leuk Esterase: x / RBC: x / WBC x   Sq Epi: x / Non Sq Epi: x / Bacteria: x                RADIOLOGY:      PHYSICAL EXAM:  CONSTITUTIONAL: No acute distress, AAOx3  HEAD: Atraumatic, normocephalic  EYES: EOM intact, PERRLA, conjunctiva and sclera clear  ENT: moist mucous membranes  PULMONARY: Clear to auscultation bilaterally  CARDIOVASCULAR: Regular rate and rhythm  GASTROINTESTINAL: Soft, non-tender, non-distended; bowel sounds present  MUSCULOSKELETAL: no edema  NEUROLOGY: non-focal  SKIN: warm and dry

## 2023-08-08 NOTE — PROGRESS NOTE ADULT - ASSESSMENT
58 [facility] with [facility]emsis and desaturation., known Down syn, hypothyroid, asphasic, recurrent infections/abscesses, osteoporosis, PS here for cofee ground emesis and hypoxia 2/2 pneumonia.    #Hematemsis (CTAP showed hiatal hernia, reactive LNs)  - Pantoprazole IV 40mg bid - switch to PO   - GI recs:  - start clear liquid diet and advance slowly as tolerated   - PPI BID IV while on pressors, can switch to PO once off pressors  - monitor H/H with active type and screen   - Avoid NSAIDs  - will benefit from EGD that can be done as outpatient    #Hypoxia 2/2 multifocal pneumonia   - MRSA and RVP negative  - nasal cannula, sat well   - c/w Ceftriaxone and Azithromycin    #Urinary Tract infection  - on Ceftriaxone   - Urine Cx = >100k E. Coli f/u sensitivities    and blood cultures - NGTD    #Down syndrome  #Congential P stenosis  TTE:  1. Suboptimal and very limited study.   2. Left ventricular ejection fraction, by visual estimation, is >55%.   3. Normal global left ventricular systolic function.   4. The left ventricular diastolic function could not be assessed in this   study.   5. Trivial pericardial effusion.    #Osteoporosis  - Raloxifene     #Peripheral neuropathy  - Gapapentin    #Hypothyroidism  - TSH - 1.63    Diet: pureed  DVT PPx Lovenox  Gi ppx:  Activity: IAT

## 2023-08-09 LAB
CULTURE RESULTS: SIGNIFICANT CHANGE UP
CULTURE RESULTS: SIGNIFICANT CHANGE UP
SPECIMEN SOURCE: SIGNIFICANT CHANGE UP
SPECIMEN SOURCE: SIGNIFICANT CHANGE UP

## 2023-08-09 PROCEDURE — 99232 SBSQ HOSP IP/OBS MODERATE 35: CPT

## 2023-08-09 RX ORDER — MEROPENEM 1 G/30ML
1000 INJECTION INTRAVENOUS ONCE
Refills: 0 | Status: COMPLETED | OUTPATIENT
Start: 2023-08-09 | End: 2023-08-09

## 2023-08-09 RX ORDER — MEROPENEM 1 G/30ML
1000 INJECTION INTRAVENOUS EVERY 8 HOURS
Refills: 0 | Status: DISCONTINUED | OUTPATIENT
Start: 2023-08-09 | End: 2023-08-09

## 2023-08-09 RX ORDER — NITROFURANTOIN MACROCRYSTAL 50 MG
100 CAPSULE ORAL
Refills: 0 | Status: DISCONTINUED | OUTPATIENT
Start: 2023-08-09 | End: 2023-08-09

## 2023-08-09 RX ORDER — MEROPENEM 1 G/30ML
1000 INJECTION INTRAVENOUS EVERY 8 HOURS
Refills: 0 | Status: DISCONTINUED | OUTPATIENT
Start: 2023-08-09 | End: 2023-08-11

## 2023-08-09 RX ORDER — MEROPENEM 1 G/30ML
INJECTION INTRAVENOUS
Refills: 0 | Status: DISCONTINUED | OUTPATIENT
Start: 2023-08-09 | End: 2023-08-11

## 2023-08-09 RX ADMIN — MEROPENEM 100 MILLIGRAM(S): 1 INJECTION INTRAVENOUS at 21:03

## 2023-08-09 RX ADMIN — Medication 1 TABLET(S): at 11:37

## 2023-08-09 RX ADMIN — POLYETHYLENE GLYCOL 3350 17 GRAM(S): 17 POWDER, FOR SOLUTION ORAL at 11:36

## 2023-08-09 RX ADMIN — ENOXAPARIN SODIUM 40 MILLIGRAM(S): 100 INJECTION SUBCUTANEOUS at 17:18

## 2023-08-09 RX ADMIN — MIDODRINE HYDROCHLORIDE 10 MILLIGRAM(S): 2.5 TABLET ORAL at 06:56

## 2023-08-09 RX ADMIN — CHLORHEXIDINE GLUCONATE 1 APPLICATION(S): 213 SOLUTION TOPICAL at 11:20

## 2023-08-09 RX ADMIN — PANTOPRAZOLE SODIUM 40 MILLIGRAM(S): 20 TABLET, DELAYED RELEASE ORAL at 17:18

## 2023-08-09 RX ADMIN — MIDODRINE HYDROCHLORIDE 10 MILLIGRAM(S): 2.5 TABLET ORAL at 13:44

## 2023-08-09 RX ADMIN — SENNA PLUS 2 TABLET(S): 8.6 TABLET ORAL at 21:01

## 2023-08-09 RX ADMIN — RALOXIFENE HYDROCHLORIDE 60 MILLIGRAM(S): 60 TABLET, COATED ORAL at 11:35

## 2023-08-09 RX ADMIN — MEROPENEM 100 MILLIGRAM(S): 1 INJECTION INTRAVENOUS at 16:13

## 2023-08-09 RX ADMIN — AZITHROMYCIN 255 MILLIGRAM(S): 500 TABLET, FILM COATED ORAL at 11:34

## 2023-08-09 RX ADMIN — MIDODRINE HYDROCHLORIDE 10 MILLIGRAM(S): 2.5 TABLET ORAL at 21:00

## 2023-08-09 RX ADMIN — GABAPENTIN 600 MILLIGRAM(S): 400 CAPSULE ORAL at 11:36

## 2023-08-09 RX ADMIN — Medication 3 MILLIGRAM(S): at 21:00

## 2023-08-09 NOTE — PROGRESS NOTE ADULT - SUBJECTIVE AND OBJECTIVE BOX
Patient is a 58y old  Female who presents with a chief complaint of Coffee ground emesis and hypoxia (09 Aug 2023 09:20)      Patient seen and examined at bedside.    ALLERGIES:  No Known Allergies    MEDICATIONS:  chlorhexidine 2% Cloths 1 Application(s) Topical daily  enoxaparin Injectable 40 milliGRAM(s) SubCutaneous every 24 hours  gabapentin 600 milliGRAM(s) Oral daily  melatonin 3 milliGRAM(s) Oral at bedtime  midodrine 10 milliGRAM(s) Oral every 8 hours  multivitamin 1 Tablet(s) Oral daily  pantoprazole    Tablet 40 milliGRAM(s) Oral two times a day  polyethylene glycol 3350 17 Gram(s) Oral daily  raloxifene 60 milliGRAM(s) Oral daily  senna 2 Tablet(s) Oral at bedtime    Vital Signs Last 24 Hrs  T(F): 97.9 (09 Aug 2023 12:11), Max: 98.6 (08 Aug 2023 21:03)  HR: 83 (09 Aug 2023 12:11) (77 - 112)  BP: 115/61 (09 Aug 2023 12:11) (107/63 - 115/61)  RR: 18 (09 Aug 2023 12:11) (18 - 20)  SpO2: 97% (09 Aug 2023 12:11) (97% - 97%)  I&O's Summary    08 Aug 2023 07:01  -  09 Aug 2023 07:00  --------------------------------------------------------  IN: 0 mL / OUT: 1075 mL / NET: -1075 mL        PHYSICAL EXAM:  General: NAD, Alert  ENT: MMM  Neck: Supple, No JVD  Lungs: Clear to auscultation bilaterally  Cardio: RRR, S1/S2, No murmurs  Abdomen: Soft, Nontender, Nondistended; Bowel sounds present  Extremities: No cyanosis, No edema, muscle atrophy    LABS:                        9.4    6.21  )-----------( 352      ( 08 Aug 2023 06:33 )             29.5     08-08    138  |  101  |  13  ----------------------------<  110  4.2   |  27  |  0.8    Ca    8.4      08 Aug 2023 06:33  Phos  3.6     08-08  Mg     2.1     08-08    TPro  5.9  /  Alb  3.1  /  TBili  <0.2  /  DBili  x   /  AST  14  /  ALT  11  /  AlkPhos  80  08-08                                Urinalysis Basic - ( 08 Aug 2023 06:33 )    Color: x / Appearance: x / SG: x / pH: x  Gluc: 110 mg/dL / Ketone: x  / Bili: x / Urobili: x   Blood: x / Protein: x / Nitrite: x   Leuk Esterase: x / RBC: x / WBC x   Sq Epi: x / Non Sq Epi: x / Bacteria: x        Culture - Urine (collected 04 Aug 2023 02:08)  Source: Clean Catch Clean Catch (Midstream)  Final Report (07 Aug 2023 18:10):    >100,000 CFU/ml Escherichia coli ESBL  Organism: Escherichia coli ESBL (07 Aug 2023 18:10)  Organism: Escherichia coli ESBL (07 Aug 2023 18:10)      Method Type: ZANE      -  Amikacin: S <=16      -  Amoxicillin/Clavulanic Acid: S <=8/4      -  Ampicillin: R >16 These ampicillin results predict results for amoxicillin      -  Ampicillin/Sulbactam: S <=4/2 Enterobacter, Klebsiella aerogenes, Citrobacter, and Serratia may develop resistance during prolonged therapy (3-4 days)      -  Aztreonam: R >16      -  Cefazolin: R >16 For uncomplicated UTI with K. pneumoniae, E. coli, or P. mirablis: ZANE <=16 is sensitive and ZANE >=32 is resistant. This also predicts results for oral agents cefaclor, cefdinir, cefpodoxime, cefprozil, cefuroxime axetil, cephalexin and locarbef for uncomplicated UTI. Note that some isolates may be susceptible to these agents while testing resistant to cefazolin.      -  Cefepime: R 4      -  Ceftriaxone: R >32 Enterobacter, Klebsiella aerogenes, Citrobacter, and Serratia may develop resistance during prolonged therapy      -  Cefuroxime: R >16      -  Ciprofloxacin: R >2      -  Ertapenem: S <=0.5      -  Gentamicin: S <=2      -  Imipenem: S <=1      -  Levofloxacin: R >4      -  Meropenem: S <=1      -  Nitrofurantoin: S <=32 Should not be used to treat pyelonephritis      -  Piperacillin/Tazobactam: S <=8      -  Tobramycin: S <=2      -  Trimethoprim/Sulfamethoxazole: R >2/38    Culture - Blood (collected 04 Aug 2023 01:05)  Source: .Blood Blood-Peripheral  Final Report (09 Aug 2023 09:01):    No growth at 5 days    Culture - Blood (collected 04 Aug 2023 01:05)  Source: .Blood Blood-Peripheral  Final Report (09 Aug 2023 09:01):    No growth at 5 days          RADIOLOGY & ADDITIONAL TESTS:    Care Discussed with Consultants/Other Providers:  Patient is a 58y old  Female who presents with a chief complaint of Coffee ground emesis and hypoxia (09 Aug 2023 09:20)      Patient seen and examined at bedside.    ALLERGIES:  No Known Allergies    MEDICATIONS:  chlorhexidine 2% Cloths 1 Application(s) Topical daily  enoxaparin Injectable 40 milliGRAM(s) SubCutaneous every 24 hours  gabapentin 600 milliGRAM(s) Oral daily  melatonin 3 milliGRAM(s) Oral at bedtime  midodrine 10 milliGRAM(s) Oral every 8 hours  multivitamin 1 Tablet(s) Oral daily  pantoprazole    Tablet 40 milliGRAM(s) Oral two times a day  polyethylene glycol 3350 17 Gram(s) Oral daily  raloxifene 60 milliGRAM(s) Oral daily  senna 2 Tablet(s) Oral at bedtime    Vital Signs Last 24 Hrs  T(F): 97.9 (09 Aug 2023 12:11), Max: 98.6 (08 Aug 2023 21:03)  HR: 83 (09 Aug 2023 12:11) (77 - 112)  BP: 115/61 (09 Aug 2023 12:11) (107/63 - 115/61)  RR: 18 (09 Aug 2023 12:11) (18 - 20)  SpO2: 97% (09 Aug 2023 12:11) (97% - 97%)  I&O's Summary    08 Aug 2023 07:01  -  09 Aug 2023 07:00  --------------------------------------------------------  IN: 0 mL / OUT: 1075 mL / NET: -1075 mL        PHYSICAL EXAM:  General: NAD, Alert  ENT: MMM  Neck: Supple, No JVD  Lungs: Clear to auscultation bilaterally  Cardio: RRR, S1/S2, 2/6 systolic murmur   Abdomen: Soft, Nontender, Nondistended; Bowel sounds present  Extremities: No cyanosis, No edema, muscle atrophy    LABS:                        9.4    6.21  )-----------( 352      ( 08 Aug 2023 06:33 )             29.5     08-08    138  |  101  |  13  ----------------------------<  110  4.2   |  27  |  0.8    Ca    8.4      08 Aug 2023 06:33  Phos  3.6     08-08  Mg     2.1     08-08    TPro  5.9  /  Alb  3.1  /  TBili  <0.2  /  DBili  x   /  AST  14  /  ALT  11  /  AlkPhos  80  08-08                                Urinalysis Basic - ( 08 Aug 2023 06:33 )    Color: x / Appearance: x / SG: x / pH: x  Gluc: 110 mg/dL / Ketone: x  / Bili: x / Urobili: x   Blood: x / Protein: x / Nitrite: x   Leuk Esterase: x / RBC: x / WBC x   Sq Epi: x / Non Sq Epi: x / Bacteria: x        Culture - Urine (collected 04 Aug 2023 02:08)  Source: Clean Catch Clean Catch (Midstream)  Final Report (07 Aug 2023 18:10):    >100,000 CFU/ml Escherichia coli ESBL  Organism: Escherichia coli ESBL (07 Aug 2023 18:10)  Organism: Escherichia coli ESBL (07 Aug 2023 18:10)      Method Type: ZANE      -  Amikacin: S <=16      -  Amoxicillin/Clavulanic Acid: S <=8/4      -  Ampicillin: R >16 These ampicillin results predict results for amoxicillin      -  Ampicillin/Sulbactam: S <=4/2 Enterobacter, Klebsiella aerogenes, Citrobacter, and Serratia may develop resistance during prolonged therapy (3-4 days)      -  Aztreonam: R >16      -  Cefazolin: R >16 For uncomplicated UTI with K. pneumoniae, E. coli, or P. mirablis: ZANE <=16 is sensitive and ZANE >=32 is resistant. This also predicts results for oral agents cefaclor, cefdinir, cefpodoxime, cefprozil, cefuroxime axetil, cephalexin and locarbef for uncomplicated UTI. Note that some isolates may be susceptible to these agents while testing resistant to cefazolin.      -  Cefepime: R 4      -  Ceftriaxone: R >32 Enterobacter, Klebsiella aerogenes, Citrobacter, and Serratia may develop resistance during prolonged therapy      -  Cefuroxime: R >16      -  Ciprofloxacin: R >2      -  Ertapenem: S <=0.5      -  Gentamicin: S <=2      -  Imipenem: S <=1      -  Levofloxacin: R >4      -  Meropenem: S <=1      -  Nitrofurantoin: S <=32 Should not be used to treat pyelonephritis      -  Piperacillin/Tazobactam: S <=8      -  Tobramycin: S <=2      -  Trimethoprim/Sulfamethoxazole: R >2/38    Culture - Blood (collected 04 Aug 2023 01:05)  Source: .Blood Blood-Peripheral  Final Report (09 Aug 2023 09:01):    No growth at 5 days    Culture - Blood (collected 04 Aug 2023 01:05)  Source: .Blood Blood-Peripheral  Final Report (09 Aug 2023 09:01):    No growth at 5 days          RADIOLOGY & ADDITIONAL TESTS:    Care Discussed with Consultants/Other Providers:

## 2023-08-09 NOTE — PROGRESS NOTE ADULT - ASSESSMENT
58 years old female referred for disability [facility] with hematemsis and desaturation., known Down syn, hypothyroid, asphasic, recurrent infections/abscesses, osteoporosis, PS here for cofee ground emesis and hypoxia 2/2 pneumonia.    #Hematemsis (CTAP showed hiatal hernia, reactive LNs)  - Pantoprazole IV 40mg bid - switch to PO   - GI recs:  - start clear liquid diet and advance slowly as tolerated   - PPI BID IV while on pressors, can switch to PO once off pressors  - monitor H/H with active type and screen   - Avoid NSAIDs  - will benefit from EGD that can be done as outpatient    #Hypoxia 2/2 multifocal pneumonia   - MRSA and RVP negative  - nasal cannula, sat well   - Procal 8/4 7.82  will trend   - s/p Ceftriaxone and Azithromycin--now on meropenum   - ID consult appreciated     #Urinary Tract infection - E. Coli ESBL  - was on  Ceftriaxone   - Urine Cx = >100k E. Coli f/u sensitivities   - 8/9 started danna 1g q8h on d/c macrobid 100mg bid to complete 7 days  - ID consult appreciated     #Down syndrome  #Congential P stenosis  TTE:  1. Suboptimal and very limited study.   2. Left ventricular ejection fraction, by visual estimation, is >55%.   3. Normal global left ventricular systolic function.   4. The left ventricular diastolic function could not be assessed in this   study.   5. Trivial pericardial effusion.    #Osteoporosis  - Raloxifene     #Peripheral neuropathy  - Gapapentin    #Hypothyroidism  - TSH - 1.63    Diet: pureed  DVT PPx Lovenox  Gi ppx:  Activity: IAT  Pending: trend h/h, procal

## 2023-08-09 NOTE — CONSULT NOTE ADULT - ATTENDING COMMENTS
I have personally seen and examined this patient.  I have fully participated in the care of this patient.  I have reviewed all pertinent clinical information, including history, physical exam, plan and note.  I have reviewed all pertinent clinical information and reviewed all relevant imaging and diagnostic studies personally.  My addendum includes the final recommendations and supersedes the resident's note.    Complicated hospital course, Severe sepsis on admission T>101 P>90 WBC 16--> 6 lactic acidosis, Shock requiring pressors  Initially had hypoxemic resp failure in the setting of coffee-ground emesis, CT with some opacities , could be aspiration PNA    procalcitonin 7/92 8/4 BCX NGTD     8/4 UCX ESBL ecoli S macrobid  < from: CT Abdomen and Pelvis w/ IV Cont (08.04.23 @ 03:49) >    Bilateral bronchial wall thickening with lower lobe airway narrowing and   bilateral patchy airspace opacities with more consolidative change in the   left hilar and lower lobe region. Findings may be   infectious/inflammatory. Correlate for multifocal pneumonia.    Mildly enlarged mediastinal lymph nodes, nonspecific, possibly reactive.   Follow-up posttreatment is recommended to ensure resolution of pulmonary   findings in lymphadenopathy as the possibility of underlying neoplasm is   not excluded.  Moderate size hiatal hernia and wall thickening of the mid to lower   esophagus. Findings may reflect esophagitis. Nonemergent direct   visualization can be obtained for further evaluation.  Urinary bladder appears mildly wall thickening. Correlate with urinalysis   to exclude cystitis.  Hepatic steatosis.    - Alicia 1g q8h IV, on d/c po macrobid 100mg BID to complete 7 days  - clinically improved on cefepime--> ceftriaxone
I edited the note.
IMPRESSION:    Septic shock  Sepsis POA  UTI  Possible aspiration   Suspected UGIB  HO Down syndrome, nonverbal at baseline  HO Hypothyroidism  HO seizure disorder, not on AEDs  HO cerebral palsy  HO congenital pulmonary stenosis    Plan as outlined above

## 2023-08-09 NOTE — CONSULT NOTE ADULT - SUBJECTIVE AND OBJECTIVE BOX
JERICHOTEA  58y, Female  Allergy: No Known Allergies    CHIEF COMPLAINT: Coffee ground emesis and hypoxia (08 Aug 2023 16:47)    HPI:  HPI:  59yo F w/a PMH of Down syndrome, nonverbal at baseline, hypothyroidism, cerebral palsy, congenital pulmonary stenosis brought in by EMS from [facility] for 1 episode of coffee-ground emesis and hypoxia just prior to arrival.  Staff from [facility] report patient has had only 1 episode of vomiting but has otherwise been well.  On EMS arrival patient was noted to be satting in the 70%s  on room air but improved to 96% on 2 L nasal cannula.  No other reported recent cough, vomiting, fever, chills, rash.    In the ED,   Vitals - BP 08678, , RR 17, T 97.8, satting 70% on RA,    Labs - WBC 16.98, Hgb 11.6, , Na 141, K 5.2 (Hemolyzed), BUN 24, Cr 1.1, Trop <.01, UA +  Imaging -  CT Chest and CTAP   - Bilateral bronchial wall thickening with lower lobe airway narrowing and bilateral patchy airspace opacities with more consolidative change in the left hilar and lower lobe region. Findings may be infectious/inflammatory. Correlate for multifocal pneumonia.  - Mildly enlarged mediastinal lymph nodes, nonspecific, possibly reactive. Follow-up posttreatment is recommended to ensure resolution of pulmonary findings in lymphadenopathy as the possibility of underlying neoplasm is not excluded.  - Moderate size hiatal hernia and wall thickening of the mid to lower esophagus. Findings may reflect esophagitis. Nonemergent direct visualization can be obtained for further evaluation.  - Urinary bladder appears mildly wall thickening. Correlate with urinalysis to exclude cystitis.  - Hepatic steatosis.    UA grossly positive in the ED. Hb found to be stable. Hypotensive and started on norepinephrine.    Admitted to the ICU for UGIB and Septic Shock 2/2 UTI  (04 Aug 2023 10:16)      Infectious Diseases History:  Old Micro Data/Cultures:     FAMILY HISTORY:  No pertinent family history in first degree relatives      PAST MEDICAL & SURGICAL HISTORY:  Down syndrome      Osteoporosis      Mild anemia      Neuropathy      S/P debridement  of R hip on 3/2/21          SOCIAL HISTORY  Social History:  Unable to assess (19 Mar 2021 09:40)      Recent Travel:  Other Exposures:     ROS  General: Denies rigors, nightsweats  HEENT: Denies headache, rhinorrhea, sore throat, eye pain  CV: Denies CP, palpitations  PULM: Denies wheezing, hemoptysis  GI: Denies hematemesis, hematochezia, melena  : Denies discharge, hematuria  MSK: Denies arthralgias, myalgias  SKIN: Denies rash, lesions  NEURO: Denies paresthesias, weakness  PSYCH: Denies depression, anxiety    VITALS:  T(F): 96.6, Max: 98.6 (08-08-23 @ 21:03)  HR: 77  BP: 107/63  RR: 20Vital Signs Last 24 Hrs  T(C): 35.9 (09 Aug 2023 05:56), Max: 37 (08 Aug 2023 21:03)  T(F): 96.6 (09 Aug 2023 05:56), Max: 98.6 (08 Aug 2023 21:03)  HR: 77 (09 Aug 2023 05:56) (77 - 112)  BP: 107/63 (09 Aug 2023 05:56) (104/62 - 110/61)  BP(mean): --  RR: 20 (09 Aug 2023 05:56) (18 - 20)  SpO2: --        PHYSICAL EXAM:  Gen: NAD, resting in bed  HEENT: Normocephalic, atraumatic  Neck: supple, no lymphadenopathy  CV: Regular rate & regular rhythm  Lungs: CTAB  Abdomen: Soft, BS present  Ext: Warm, well perfused  Neuro: non focal, awake  Skin: no rash, no lesions  Lines: no phlebitis    TESTS & MEASUREMENTS:                        9.4    6.21  )-----------( 352      ( 08 Aug 2023 06:33 )             29.5     08-08    138  |  101  |  13  ----------------------------<  110<H>  4.2   |  27  |  0.8    Ca    8.4      08 Aug 2023 06:33  Phos  3.6     08-08  Mg     2.1     08-08    TPro  5.9<L>  /  Alb  3.1<L>  /  TBili  <0.2  /  DBili  x   /  AST  14  /  ALT  11  /  AlkPhos  80  08-08      LIVER FUNCTIONS - ( 08 Aug 2023 06:33 )  Alb: 3.1 g/dL / Pro: 5.9 g/dL / ALK PHOS: 80 U/L / ALT: 11 U/L / AST: 14 U/L / GGT: x           Urinalysis Basic - ( 08 Aug 2023 06:33 )    Color: x / Appearance: x / SG: x / pH: x  Gluc: 110 mg/dL / Ketone: x  / Bili: x / Urobili: x   Blood: x / Protein: x / Nitrite: x   Leuk Esterase: x / RBC: x / WBC x   Sq Epi: x / Non Sq Epi: x / Bacteria: x        Culture - Urine (collected 08-04-23 @ 02:08)  Source: Clean Catch Clean Catch (Midstream)  Final Report (08-07-23 @ 18:10):    >100,000 CFU/ml Escherichia coli ESBL  Organism: Escherichia coli ESBL (08-07-23 @ 18:10)  Organism: Escherichia coli ESBL (08-07-23 @ 18:10)      Method Type: ZANE      -  Amikacin: S <=16      -  Amoxicillin/Clavulanic Acid: S <=8/4      -  Ampicillin: R >16 These ampicillin results predict results for amoxicillin      -  Ampicillin/Sulbactam: S <=4/2 Enterobacter, Klebsiella aerogenes, Citrobacter, and Serratia may develop resistance during prolonged therapy (3-4 days)      -  Aztreonam: R >16      -  Cefazolin: R >16 For uncomplicated UTI with K. pneumoniae, E. coli, or P. mirablis: ZANE <=16 is sensitive and ZANE >=32 is resistant. This also predicts results for oral agents cefaclor, cefdinir, cefpodoxime, cefprozil, cefuroxime axetil, cephalexin and locarbef for uncomplicated UTI. Note that some isolates may be susceptible to these agents while testing resistant to cefazolin.      -  Cefepime: R 4      -  Ceftriaxone: R >32 Enterobacter, Klebsiella aerogenes, Citrobacter, and Serratia may develop resistance during prolonged therapy      -  Cefuroxime: R >16      -  Ciprofloxacin: R >2      -  Ertapenem: S <=0.5      -  Gentamicin: S <=2      -  Imipenem: S <=1      -  Levofloxacin: R >4      -  Meropenem: S <=1      -  Nitrofurantoin: S <=32 Should not be used to treat pyelonephritis      -  Piperacillin/Tazobactam: S <=8      -  Tobramycin: S <=2      -  Trimethoprim/Sulfamethoxazole: R >2/38    Culture - Blood (collected 08-04-23 @ 01:05)  Source: .Blood Blood-Peripheral  Final Report (08-09-23 @ 09:01):    No growth at 5 days    Culture - Blood (collected 08-04-23 @ 01:05)  Source: .Blood Blood-Peripheral  Final Report (08-09-23 @ 09:01):    No growth at 5 days        Lactate, Blood: 1.1 mmol/L (08-05-23 @ 04:27)  Lactate, Blood: 1.1 mmol/L (08-04-23 @ 16:13)      INFECTIOUS DISEASES TESTING  MRSA PCR Result.: Negative (08-04-23 @ 14:52)      RADIOLOGY & ADDITIONAL TESTS:  I have personally reviewed the last available Chest xray  CXR      CT      CARDIOLOGY TESTING  12 Lead ECG:   Ventricular Rate 105 BPM    Atrial Rate 105 BPM    P-R Interval 132 ms    QRS Duration 64 ms    Q-T Interval 330 ms    QTC Calculation(Bazett) 436 ms    P Axis 46 degrees    R Axis 36 degrees    T Axis 34 degrees    Diagnosis Line Sinus tachycardia  Otherwise normal ECG    Confirmed by MARJAN MENDES MD ([id number]) on 8/9/2023 7:34:13 AM (08-08-23 @ 22:06)      All available historical records have been reviewed    MEDICATIONS  azithromycin  IVPB   azithromycin  IVPB 500  chlorhexidine 2% Cloths 1  enoxaparin Injectable 40  gabapentin 600  melatonin 3  midodrine 10  multivitamin 1  pantoprazole    Tablet 40  polyethylene glycol 3350 17  raloxifene 60  senna 2      ANTIBIOTICS:  azithromycin  IVPB      azithromycin  IVPB 500 milliGRAM(s) IV Intermittent every 24 hours      All available historical data has been reviewed     JERICHOTEA  58y, Female  Allergy: No Known Allergies    CHIEF COMPLAINT: Coffee ground emesis and hypoxia (08 Aug 2023 16:47)    HPI:  HPI:  59yo F w/a PMH of Down syndrome, nonverbal at baseline, hypothyroidism, cerebral palsy, congenital pulmonary stenosis brought in by EMS from [facility] for 1 episode of coffee-ground emesis and hypoxia just prior to arrival.  Staff from [facility] report patient has had only 1 episode of vomiting but has otherwise been well.  On EMS arrival patient was noted to be satting in the 70%s  on room air but improved to 96% on 2 L nasal cannula.  No other reported recent cough, vomiting, fever, chills, rash.    In the ED,   Vitals - BP 09359, , RR 17, T 97.8, satting 70% on RA,    Labs - WBC 16.98, Hgb 11.6, , Na 141, K 5.2 (Hemolyzed), BUN 24, Cr 1.1, Trop <.01, UA +  Imaging -  CT Chest and CTAP   - Bilateral bronchial wall thickening with lower lobe airway narrowing and bilateral patchy airspace opacities with more consolidative change in the left hilar and lower lobe region. Findings may be infectious/inflammatory. Correlate for multifocal pneumonia.  - Mildly enlarged mediastinal lymph nodes, nonspecific, possibly reactive. Follow-up posttreatment is recommended to ensure resolution of pulmonary findings in lymphadenopathy as the possibility of underlying neoplasm is not excluded.  - Moderate size hiatal hernia and wall thickening of the mid to lower esophagus. Findings may reflect esophagitis. Nonemergent direct visualization can be obtained for further evaluation.  - Urinary bladder appears mildly wall thickening. Correlate with urinalysis to exclude cystitis.  - Hepatic steatosis.    UA grossly positive in the ED. Hb found to be stable. Hypotensive and started on norepinephrine.    Admitted to the ICU for UGIB and Septic Shock 2/2 UTI  (04 Aug 2023 10:16)      Infectious Diseases History:  Old Micro Data/Cultures:     FAMILY HISTORY:  No pertinent family history in first degree relatives      PAST MEDICAL & SURGICAL HISTORY:  Down syndrome      Osteoporosis      Mild anemia      Neuropathy      S/P debridement  of R hip on 3/2/21          SOCIAL HISTORY  Social History:  Unable to assess (19 Mar 2021 09:40)      Recent Travel:  Other Exposures:     ROS  Unable to obtain due to pt's baseline mental status    VITALS:  T(F): 96.6, Max: 98.6 (08-08-23 @ 21:03)  HR: 77  BP: 107/63  RR: 20Vital Signs Last 24 Hrs  T(C): 35.9 (09 Aug 2023 05:56), Max: 37 (08 Aug 2023 21:03)  T(F): 96.6 (09 Aug 2023 05:56), Max: 98.6 (08 Aug 2023 21:03)  HR: 77 (09 Aug 2023 05:56) (77 - 112)  BP: 107/63 (09 Aug 2023 05:56) (104/62 - 110/61)  BP(mean): --  RR: 20 (09 Aug 2023 05:56) (18 - 20)  SpO2: --        PHYSICAL EXAM:  Gen: NAD, resting in bed  HEENT: Normocephalic, atraumatic  CV: RRR, decrescendo murmur in L 2nd ICS  Lungs: CTAB  Abdomen: Soft, NTND  Ext: Warm, well perfused  Neuro: nonverbal at baseline    TESTS & MEASUREMENTS:                        9.4    6.21  )-----------( 352      ( 08 Aug 2023 06:33 )             29.5     08-08    138  |  101  |  13  ----------------------------<  110<H>  4.2   |  27  |  0.8    Ca    8.4      08 Aug 2023 06:33  Phos  3.6     08-08  Mg     2.1     08-08    TPro  5.9<L>  /  Alb  3.1<L>  /  TBili  <0.2  /  DBili  x   /  AST  14  /  ALT  11  /  AlkPhos  80  08-08      LIVER FUNCTIONS - ( 08 Aug 2023 06:33 )  Alb: 3.1 g/dL / Pro: 5.9 g/dL / ALK PHOS: 80 U/L / ALT: 11 U/L / AST: 14 U/L / GGT: x           Urinalysis Basic - ( 08 Aug 2023 06:33 )    Color: x / Appearance: x / SG: x / pH: x  Gluc: 110 mg/dL / Ketone: x  / Bili: x / Urobili: x   Blood: x / Protein: x / Nitrite: x   Leuk Esterase: x / RBC: x / WBC x   Sq Epi: x / Non Sq Epi: x / Bacteria: x        Culture - Urine (collected 08-04-23 @ 02:08)  Source: Clean Catch Clean Catch (Midstream)  Final Report (08-07-23 @ 18:10):    >100,000 CFU/ml Escherichia coli ESBL  Organism: Escherichia coli ESBL (08-07-23 @ 18:10)  Organism: Escherichia coli ESBL (08-07-23 @ 18:10)      Method Type: ZANE      -  Amikacin: S <=16      -  Amoxicillin/Clavulanic Acid: S <=8/4      -  Ampicillin: R >16 These ampicillin results predict results for amoxicillin      -  Ampicillin/Sulbactam: S <=4/2 Enterobacter, Klebsiella aerogenes, Citrobacter, and Serratia may develop resistance during prolonged therapy (3-4 days)      -  Aztreonam: R >16      -  Cefazolin: R >16 For uncomplicated UTI with K. pneumoniae, E. coli, or P. mirablis: ZANE <=16 is sensitive and ZANE >=32 is resistant. This also predicts results for oral agents cefaclor, cefdinir, cefpodoxime, cefprozil, cefuroxime axetil, cephalexin and locarbef for uncomplicated UTI. Note that some isolates may be susceptible to these agents while testing resistant to cefazolin.      -  Cefepime: R 4      -  Ceftriaxone: R >32 Enterobacter, Klebsiella aerogenes, Citrobacter, and Serratia may develop resistance during prolonged therapy      -  Cefuroxime: R >16      -  Ciprofloxacin: R >2      -  Ertapenem: S <=0.5      -  Gentamicin: S <=2      -  Imipenem: S <=1      -  Levofloxacin: R >4      -  Meropenem: S <=1      -  Nitrofurantoin: S <=32 Should not be used to treat pyelonephritis      -  Piperacillin/Tazobactam: S <=8      -  Tobramycin: S <=2      -  Trimethoprim/Sulfamethoxazole: R >2/38    Culture - Blood (collected 08-04-23 @ 01:05)  Source: .Blood Blood-Peripheral  Final Report (08-09-23 @ 09:01):    No growth at 5 days    Culture - Blood (collected 08-04-23 @ 01:05)  Source: .Blood Blood-Peripheral  Final Report (08-09-23 @ 09:01):    No growth at 5 days        Lactate, Blood: 1.1 mmol/L (08-05-23 @ 04:27)  Lactate, Blood: 1.1 mmol/L (08-04-23 @ 16:13)      INFECTIOUS DISEASES TESTING  MRSA PCR Result.: Negative (08-04-23 @ 14:52)      RADIOLOGY & ADDITIONAL TESTS:  I have personally reviewed the last available Chest xray  CXR      CT      CARDIOLOGY TESTING  12 Lead ECG:   Ventricular Rate 105 BPM    Atrial Rate 105 BPM    P-R Interval 132 ms    QRS Duration 64 ms    Q-T Interval 330 ms    QTC Calculation(Bazett) 436 ms    P Axis 46 degrees    R Axis 36 degrees    T Axis 34 degrees    Diagnosis Line Sinus tachycardia  Otherwise normal ECG    Confirmed by MARJAN MENDES MD ([id number]) on 8/9/2023 7:34:13 AM (08-08-23 @ 22:06)      All available historical records have been reviewed    MEDICATIONS  azithromycin  IVPB   azithromycin  IVPB 500  chlorhexidine 2% Cloths 1  enoxaparin Injectable 40  gabapentin 600  melatonin 3  midodrine 10  multivitamin 1  pantoprazole    Tablet 40  polyethylene glycol 3350 17  raloxifene 60  senna 2      ANTIBIOTICS:  azithromycin  IVPB      azithromycin  IVPB 500 milliGRAM(s) IV Intermittent every 24 hours      All available historical data has been reviewed

## 2023-08-09 NOTE — CONSULT NOTE ADULT - TIME BILLING
Coordination of care
I have personally seen and examined this patient.  I have reviewed all pertinent clinical information and reviewed all relevant imaging and diagnostic studies personally.   If possible, I counseled the patient about diagnostic testing and treatment plan.   I discussed my recommendations with the primary team.

## 2023-08-09 NOTE — CONSULT NOTE ADULT - ASSESSMENT
ASSESSMENT  59yo F w/a PMH of Down syndrome, nonverbal at baseline, hypothyroidism, cerebral palsy, congenital pulmonary stenosis BIBEMS from group home for 1 episode of coffee-ground emesis and hypoxia. Elevated WBC and lactate on arrival. UA found to be positive and CT chest revealed evidence of multifocal pneumonia. Pt was hypotensive in ED and admitted to ICU on levophed which has since been dc'd and pt was downgraded to RMF.     IMPRESSION  #Acute Bacterial Cystitis, ESBL E. coli   #?Multifocal Pneumonia    RECOMMENDATIONS  - UA+, UC grew ESBL E. coli (see results for sensitivities)  - CT CAP revealed evidence of MF PNA and cystitis  - s/p vancomycin, cefepime, aztreonam and rocephin  - Lactate trended down to 1.1  - WBC trending down to 6 today   - currently on azithromycin for suspected MF PNA  - ***    This is a pended note. All final recommendations to follow pending discussion with ID Attending. ASSESSMENT  59yo F w/a PMH of Down syndrome, nonverbal at baseline, hypothyroidism, cerebral palsy, congenital pulmonary stenosis BIBEMS from group home for 1 episode of coffee-ground emesis and hypoxia. Elevated WBC and lactate on arrival. UA found to be positive and CT chest revealed evidence of multifocal pneumonia. Pt was hypotensive in ED and admitted to ICU on levophed which has since been dc'd and pt was downgraded to RMF.     IMPRESSION  #Acute Bacterial Cystitis, ESBL E. coli   #?Multifocal Pneumonia, unlikely w/o Sx of PNA    RECOMMENDATIONS  - UA+, UC grew ESBL E. coli (see results for sensitivities)  - CT CAP revealed evidence of MF PNA and cystitis  - s/p vancomycin, cefepime, aztreonam and rocephin  - Lactate trended down to 1.1  - WBC trending down to 6 today   - D/c azithromycin  - D/c meropenem  - Start Macrobid 100mg BID x 7 days    This is a pended note. All final recommendations to follow pending discussion with ID Attending. ASSESSMENT  59yo F w/a PMH of Down syndrome, nonverbal at baseline, hypothyroidism, cerebral palsy, congenital pulmonary stenosis BIBEMS from group home for 1 episode of coffee-ground emesis and hypoxia. Elevated WBC and lactate on arrival. UA found to be positive and CT chest revealed evidence of multifocal pneumonia. Pt was hypotensive in ED and admitted to ICU on levophed which has since been dc'd and pt was downgraded to RMF.     IMPRESSION  #Acute Bacterial Cystitis, ESBL E. coli   #?Multifocal Pneumonia, unlikely w/o Sx of PNA    RECOMMENDATIONS  - UA+, UC grew ESBL E. coli (see results for sensitivities)  - CT CAP revealed evidence of MF PNA and cystitis  - s/p vancomycin, cefepime, aztreonam and rocephin  - Lactate trended down to 1.1  - WBC trending down to 6 today   - D/c azithromycin  - Meropenem 1g Q8  - Macrobid 100mg BID x 7 days PO on discharge    This is a pended note. All final recommendations to follow pending discussion with ID Attending. ASSESSMENT  57yo F w/a PMH of Down syndrome, nonverbal at baseline, hypothyroidism, cerebral palsy, congenital pulmonary stenosis BIBEMS from group home for 1 episode of coffee-ground emesis and hypoxia. Elevated WBC and lactate on arrival. UA found to be positive and CT chest revealed evidence of multifocal pneumonia. Pt was hypotensive in ED and admitted to ICU on levophed which has since been dc'd and pt was downgraded to RMF.     IMPRESSION  #Acute Bacterial Cystitis, ESBL E. coli   #?Multifocal Pneumonia, unlikely w/o Sx of PNA    RECOMMENDATIONS  - UA+, UC grew ESBL E. coli (see results for sensitivities)  - CT CAP revealed evidence of MF PNA and cystitis  - s/p vancomycin, cefepime, aztreonam and rocephin  - Lactate trended down to 1.1  - WBC trending down to 6 today   - D/c azithromycin  - Meropenem 1g Q8  - Macrobid 100mg BID x to complete 7 days PO on discharge

## 2023-08-10 LAB
ALBUMIN SERPL ELPH-MCNC: 3.3 G/DL — LOW (ref 3.5–5.2)
ALP SERPL-CCNC: 86 U/L — SIGNIFICANT CHANGE UP (ref 30–115)
ALT FLD-CCNC: 13 U/L — SIGNIFICANT CHANGE UP (ref 0–41)
ANION GAP SERPL CALC-SCNC: 10 MMOL/L — SIGNIFICANT CHANGE UP (ref 7–14)
AST SERPL-CCNC: 16 U/L — SIGNIFICANT CHANGE UP (ref 0–41)
BASOPHILS # BLD AUTO: 0.08 K/UL — SIGNIFICANT CHANGE UP (ref 0–0.2)
BASOPHILS NFR BLD AUTO: 1.1 % — HIGH (ref 0–1)
BILIRUB SERPL-MCNC: <0.2 MG/DL — SIGNIFICANT CHANGE UP (ref 0.2–1.2)
BUN SERPL-MCNC: 16 MG/DL — SIGNIFICANT CHANGE UP (ref 10–20)
CALCIUM SERPL-MCNC: 8.4 MG/DL — SIGNIFICANT CHANGE UP (ref 8.4–10.5)
CHLORIDE SERPL-SCNC: 104 MMOL/L — SIGNIFICANT CHANGE UP (ref 98–110)
CO2 SERPL-SCNC: 26 MMOL/L — SIGNIFICANT CHANGE UP (ref 17–32)
CREAT SERPL-MCNC: 0.8 MG/DL — SIGNIFICANT CHANGE UP (ref 0.7–1.5)
EGFR: 86 ML/MIN/1.73M2 — SIGNIFICANT CHANGE UP
EOSINOPHIL # BLD AUTO: 0.22 K/UL — SIGNIFICANT CHANGE UP (ref 0–0.7)
EOSINOPHIL NFR BLD AUTO: 3.1 % — SIGNIFICANT CHANGE UP (ref 0–8)
GLUCOSE SERPL-MCNC: 107 MG/DL — HIGH (ref 70–99)
HCT VFR BLD CALC: 29.3 % — LOW (ref 37–47)
HGB BLD-MCNC: 9.3 G/DL — LOW (ref 12–16)
IMM GRANULOCYTES NFR BLD AUTO: 0.3 % — SIGNIFICANT CHANGE UP (ref 0.1–0.3)
IRON SATN MFR SERPL: 18 UG/DL — LOW (ref 35–150)
IRON SATN MFR SERPL: 4 % — LOW (ref 15–50)
LYMPHOCYTES # BLD AUTO: 1.97 K/UL — SIGNIFICANT CHANGE UP (ref 1.2–3.4)
LYMPHOCYTES # BLD AUTO: 27.5 % — SIGNIFICANT CHANGE UP (ref 20.5–51.1)
MAGNESIUM SERPL-MCNC: 2.2 MG/DL — SIGNIFICANT CHANGE UP (ref 1.8–2.4)
MCHC RBC-ENTMCNC: 25.5 PG — LOW (ref 27–31)
MCHC RBC-ENTMCNC: 31.7 G/DL — LOW (ref 32–37)
MCV RBC AUTO: 80.5 FL — LOW (ref 81–99)
MONOCYTES # BLD AUTO: 0.55 K/UL — SIGNIFICANT CHANGE UP (ref 0.1–0.6)
MONOCYTES NFR BLD AUTO: 7.7 % — SIGNIFICANT CHANGE UP (ref 1.7–9.3)
NEUTROPHILS # BLD AUTO: 4.33 K/UL — SIGNIFICANT CHANGE UP (ref 1.4–6.5)
NEUTROPHILS NFR BLD AUTO: 60.3 % — SIGNIFICANT CHANGE UP (ref 42.2–75.2)
NRBC # BLD: 0 /100 WBCS — SIGNIFICANT CHANGE UP (ref 0–0)
PLATELET # BLD AUTO: 400 K/UL — SIGNIFICANT CHANGE UP (ref 130–400)
PMV BLD: 9.7 FL — SIGNIFICANT CHANGE UP (ref 7.4–10.4)
POTASSIUM SERPL-MCNC: 4.3 MMOL/L — SIGNIFICANT CHANGE UP (ref 3.5–5)
POTASSIUM SERPL-SCNC: 4.3 MMOL/L — SIGNIFICANT CHANGE UP (ref 3.5–5)
PROT SERPL-MCNC: 6.3 G/DL — SIGNIFICANT CHANGE UP (ref 6–8)
RBC # BLD: 3.64 M/UL — LOW (ref 4.2–5.4)
RBC # BLD: 3.64 M/UL — LOW (ref 4.2–5.4)
RBC # FLD: 15.2 % — HIGH (ref 11.5–14.5)
RETICS #: 40.4 K/UL — SIGNIFICANT CHANGE UP (ref 25–125)
RETICS/RBC NFR: 1.1 % — SIGNIFICANT CHANGE UP (ref 0.5–1.5)
SODIUM SERPL-SCNC: 140 MMOL/L — SIGNIFICANT CHANGE UP (ref 135–146)
TIBC SERPL-MCNC: 404 UG/DL — SIGNIFICANT CHANGE UP (ref 220–430)
TRANSFERRIN SERPL-MCNC: 330 MG/DL — SIGNIFICANT CHANGE UP (ref 200–360)
UIBC SERPL-MCNC: 386 UG/DL — HIGH (ref 110–370)
WBC # BLD: 7.17 K/UL — SIGNIFICANT CHANGE UP (ref 4.8–10.8)
WBC # FLD AUTO: 7.17 K/UL — SIGNIFICANT CHANGE UP (ref 4.8–10.8)

## 2023-08-10 PROCEDURE — 99232 SBSQ HOSP IP/OBS MODERATE 35: CPT

## 2023-08-10 RX ADMIN — ENOXAPARIN SODIUM 40 MILLIGRAM(S): 100 INJECTION SUBCUTANEOUS at 17:03

## 2023-08-10 RX ADMIN — POLYETHYLENE GLYCOL 3350 17 GRAM(S): 17 POWDER, FOR SOLUTION ORAL at 11:40

## 2023-08-10 RX ADMIN — MIDODRINE HYDROCHLORIDE 10 MILLIGRAM(S): 2.5 TABLET ORAL at 21:28

## 2023-08-10 RX ADMIN — MEROPENEM 100 MILLIGRAM(S): 1 INJECTION INTRAVENOUS at 21:26

## 2023-08-10 RX ADMIN — Medication 3 MILLIGRAM(S): at 21:28

## 2023-08-10 RX ADMIN — CHLORHEXIDINE GLUCONATE 1 APPLICATION(S): 213 SOLUTION TOPICAL at 11:01

## 2023-08-10 RX ADMIN — MEROPENEM 100 MILLIGRAM(S): 1 INJECTION INTRAVENOUS at 13:29

## 2023-08-10 RX ADMIN — Medication 1 TABLET(S): at 11:22

## 2023-08-10 RX ADMIN — SENNA PLUS 2 TABLET(S): 8.6 TABLET ORAL at 21:28

## 2023-08-10 RX ADMIN — GABAPENTIN 600 MILLIGRAM(S): 400 CAPSULE ORAL at 11:22

## 2023-08-10 RX ADMIN — PANTOPRAZOLE SODIUM 40 MILLIGRAM(S): 20 TABLET, DELAYED RELEASE ORAL at 06:26

## 2023-08-10 RX ADMIN — MEROPENEM 100 MILLIGRAM(S): 1 INJECTION INTRAVENOUS at 06:25

## 2023-08-10 RX ADMIN — MIDODRINE HYDROCHLORIDE 10 MILLIGRAM(S): 2.5 TABLET ORAL at 06:26

## 2023-08-10 RX ADMIN — RALOXIFENE HYDROCHLORIDE 60 MILLIGRAM(S): 60 TABLET, COATED ORAL at 11:22

## 2023-08-10 RX ADMIN — MIDODRINE HYDROCHLORIDE 10 MILLIGRAM(S): 2.5 TABLET ORAL at 13:30

## 2023-08-10 NOTE — PROGRESS NOTE ADULT - ATTENDING COMMENTS
59yo F w/a PMH of Down syndrome, nonverbal at baseline, hypothyroidism, cerebral palsy, congenital pulmonary stenosis brought in by EMS from [facility] for 1 episode of coffee-ground emesis and hypoxia just prior to arrival.  Staff from [facility] report patient has had only 1 episode of vomiting but has otherwise been well.  On EMS arrival patient was noted to be satting in the 70%s  on room air but improved to 96% on 2 L nasal cannula.  No other reported recent cough, vomiting, fever, chills, rash.    58 years old female referred for [facility] with hematemsis and desaturation., known Down syn, hypothyroid, asphasic, recurrent infections/abscesses, osteoporosis, PS.     #Hematemsis (CTAP showed hiatal hernia, reactive LNs)  Pantoprazole IV 40mg bid - switch to PO   GI consulted:   start clear liquid diet and advance slowly as tolerated   monitor H/H with active type and screen - stable at 9.4  Avoid NSAIDs  will benefit from EGD that can be done as outpatient    #Hypoxia 2/2 multifocal pneumonia   MRSA and RVP negative  -nasal cannula, sat well   c/w Ceftriaxone and Azithromycin    #Urinary Tract infection  on Ceftriaxone   - Urine Cx = >100k E. Coli f/u sensitivities    and blood cultures - NGTD    #Down syndrome  #Congential P stenosis  TTE:  1. Suboptimal and very limited study.   2. Left ventricular ejection fraction, by visual estimation, is >55%.   3. Normal global left ventricular systolic function.   4. The left ventricular diastolic function could not be assessed in this   study.   5. Trivial pericardial effusion.      #Osteoporosis  -Raloxifene     #Peripheral neuropathy  -Gapapentin    #Hypothyroidism  TSH - 1.63    DVT PPx Lovenox    Progress Note Handoff:  Pending: dc planning
UTI  - on merrem  - no need for IVabx as per ID  - can go on oral    hematemesis- resolved  - outpt gi fu for egd
Attending Statement: I have personally performed a face to face diagnostic evaluation on this patient. The patient is suffering from:  Septic shock  Sepsis POA  UTI  Possible aspiration   Multifocal PNA  Suspected UGIB  HO Down syndrome,  I have made amendments to the documentation where necessary. I have personally seen and examined this patient.  I have fully participated in the care of this patient.  I have reviewed all pertinent clinical information, including history, physical exam, plan and note.

## 2023-08-10 NOTE — PROGRESS NOTE ADULT - REASON FOR ADMISSION
Coffee ground emesis and hypoxia

## 2023-08-10 NOTE — PROGRESS NOTE ADULT - SUBJECTIVE AND OBJECTIVE BOX
TEA ECHAVARRIA 58y Female  MRN#: [id number]   Hospital Day: 6d    SUBJECTIVE  57yo F w/a PMH of Down syndrome, nonverbal at baseline, hypothyroidism, cerebral palsy, congenital pulmonary stenosis brought in by EMS from [facility] for 1 episode of coffee-ground emesis and hypoxia just prior to arrival.  Staff from [facility] report patient has had only 1 episode of vomiting but has otherwise been well.  On EMS arrival patient was noted to be satting in the 70%s  on room air but improved to 96% on 2 L nasal cannula.  No other reported recent cough, vomiting, fever, chills, rash.    In the ED,   Vitals - BP 80923, , RR 17, T 97.8, satting 70% on RA,    Labs - WBC 16.98, Hgb 11.6, , Na 141, K 5.2 (Hemolyzed), BUN 24, Cr 1.1, Trop <.01, UA +  Imaging -  CT Chest and CTAP   - Bilateral bronchial wall thickening with lower lobe airway narrowing and bilateral patchy airspace opacities with more consolidative change in the left hilar and lower lobe region. Findings may be infectious/inflammatory. Correlate for multifocal pneumonia.  - Mildly enlarged mediastinal lymph nodes, nonspecific, possibly reactive. Follow-up posttreatment is recommended to ensure resolution of pulmonary findings in lymphadenopathy as the possibility of underlying neoplasm is not excluded.  - Moderate size hiatal hernia and wall thickening of the mid to lower esophagus. Findings may reflect esophagitis. Nonemergent direct visualization can be obtained for further evaluation.  - Urinary bladder appears mildly wall thickening. Correlate with urinalysis to exclude cystitis.  - Hepatic steatosis.    UA grossly positive in the ED. Hb found to be stable. Hypotensive and started on norepinephrine.    Admitted to the ICU for UGIB and Septic Shock 2/2 UTI     Patient is a 58y old Female who presents with a chief complaint of Coffee ground emesis and hypoxia (09 Aug 2023 13:21)  Currently admitted to medicine with the primary diagnosis of Sepsis due to urinary tract infection      INTERVAL HPI AND OVERNIGHT EVENTS:  Patient was examined and seen at bedside. This morning she is resting comfortably in bed and reports no issues or overnight events.    OBJECTIVE  PAST MEDICAL & SURGICAL HISTORY  Down syndrome    Osteoporosis    Mild anemia    Neuropathy    S/P debridement  of R hip on 3/2/21      ALLERGIES:  No Known Allergies    MEDICATIONS:  STANDING MEDICATIONS  chlorhexidine 2% Cloths 1 Application(s) Topical daily  enoxaparin Injectable 40 milliGRAM(s) SubCutaneous every 24 hours  gabapentin 600 milliGRAM(s) Oral daily  melatonin 3 milliGRAM(s) Oral at bedtime  meropenem  IVPB 1000 milliGRAM(s) IV Intermittent every 8 hours  meropenem  IVPB      midodrine 10 milliGRAM(s) Oral every 8 hours  multivitamin 1 Tablet(s) Oral daily  pantoprazole    Tablet 40 milliGRAM(s) Oral two times a day  polyethylene glycol 3350 17 Gram(s) Oral daily  raloxifene 60 milliGRAM(s) Oral daily  senna 2 Tablet(s) Oral at bedtime    PRN MEDICATIONS      VITAL SIGNS: Last 24 Hours  T(C): 36.9 (10 Aug 2023 11:54), Max: 36.9 (10 Aug 2023 11:54)  T(F): 98.5 (10 Aug 2023 11:54), Max: 98.5 (10 Aug 2023 11:54)  HR: 101 (10 Aug 2023 11:54) (84 - 101)  BP: 105/58 (10 Aug 2023 11:54) (100/54 - 105/62)  BP(mean): 73 (09 Aug 2023 19:45) (73 - 73)  RR: 18 (10 Aug 2023 11:54) (18 - 19)  SpO2: 98% (10 Aug 2023 05:00) (98% - 98%)    LABS:                        9.3    7.17  )-----------( 400      ( 10 Aug 2023 08:46 )             29.3     08-10    140  |  104  |  16  ----------------------------<  107<H>  4.3   |  26  |  0.8    Ca    8.4      10 Aug 2023 08:46  Mg     2.2     08-10    TPro  6.3  /  Alb  3.3<L>  /  TBili  <0.2  /  DBili  x   /  AST  16  /  ALT  13  /  AlkPhos  86  08-10      Urinalysis Basic - ( 10 Aug 2023 08:46 )    Color: x / Appearance: x / SG: x / pH: x  Gluc: 107 mg/dL / Ketone: x  / Bili: x / Urobili: x   Blood: x / Protein: x / Nitrite: x   Leuk Esterase: x / RBC: x / WBC x   Sq Epi: x / Non Sq Epi: x / Bacteria: x                RADIOLOGY:      PHYSICAL EXAM:  CONSTITUTIONAL: No acute distress  HEAD: Atraumatic, normocephalic  EYES: EOM intact, PERRLA, conjunctiva and sclera clear  ENT: moist mucous membranes  PULMONARY: Clear to auscultation bilaterally  CARDIOVASCULAR: Regular rate and rhythm  GASTROINTESTINAL: Soft, non-tender, non-distended; bowel sounds present  MUSCULOSKELETAL: no edema  NEUROLOGY: non-focal  SKIN: warm and dry

## 2023-08-10 NOTE — PROGRESS NOTE ADULT - ASSESSMENT
58 years old female referred for [facility] with hematemsis and desaturation., known Down syn, hypothyroid, asphasic, recurrent infections/abscesses, osteoporosis, PS here for coffee ground emesis and hypoxia 2/2 pneumonia.    #Hematemesis (CTAP showed hiatal hernia, reactive LNs)  - Pantoprazole IV 40mg bid - switch to PO   - GI recs:  - start clear liquid diet and advance slowly as tolerated   - PPI BID IV while on pressors, can switch to PO once off pressors  - monitor H/H with active type and screen   - Avoid NSAIDs  - will benefit from EGD that can be done as outpatient    #Hypoxia 2/2 multifocal pneumonia   - MRSA and RVP negative  - nasal cannula, sat well   - d/c Ceftriaxone and Azithromycin    #Urinary Tract infection  - on Ceftriaxone   - Urine Cx = >100k E. Coli sensitivity to meropenem   and blood cultures - NGTD  - UA+, UC grew ESBL E. coli (see results for sensitivities)  - CT CAP revealed evidence of MF PNA and cystitis  - s/p vancomycin, cefepime, aztreonam and rocephin  - Lactate trended down to 1.1  - WBC trending down to 6 today  - Meropenem 1g Q8  - Macrobid 100mg BID x to complete 7 days PO on discharge    #Down syndrome  #Congential P stenosis  TTE:  1. Suboptimal and very limited study.   2. Left ventricular ejection fraction, by visual estimation, is >55%.   3. Normal global left ventricular systolic function.   4. The left ventricular diastolic function could not be assessed in this   study.   5. Trivial pericardial effusion.    #Osteoporosis  - Raloxifene     #Peripheral neuropathy  - Gapapentin    #Hypothyroidism  - TSH - 1.63    Diet: pureed  DVT PPx Lovenox  Gi ppx:  Activity: IAT  Dispo: prep discharge

## 2023-08-11 ENCOUNTER — TRANSCRIPTION ENCOUNTER (OUTPATIENT)
Age: 58
End: 2023-08-11

## 2023-08-11 VITALS
SYSTOLIC BLOOD PRESSURE: 178 MMHG | HEART RATE: 64 BPM | DIASTOLIC BLOOD PRESSURE: 68 MMHG | WEIGHT: 164.02 LBS | TEMPERATURE: 98 F | RESPIRATION RATE: 18 BRPM

## 2023-08-11 LAB
FERRITIN SERPL-MCNC: 19 NG/ML — SIGNIFICANT CHANGE UP (ref 13–330)
PROCALCITONIN SERPL-MCNC: 0.63 NG/ML — HIGH (ref 0.02–0.1)

## 2023-08-11 PROCEDURE — 99239 HOSP IP/OBS DSCHRG MGMT >30: CPT

## 2023-08-11 RX ORDER — NITROFURANTOIN MACROCRYSTAL 50 MG
1 CAPSULE ORAL
Qty: 10 | Refills: 0
Start: 2023-08-11 | End: 2023-08-15

## 2023-08-11 RX ORDER — PANTOPRAZOLE SODIUM 20 MG/1
1 TABLET, DELAYED RELEASE ORAL
Qty: 30 | Refills: 0
Start: 2023-08-11 | End: 2023-09-09

## 2023-08-11 RX ADMIN — MIDODRINE HYDROCHLORIDE 10 MILLIGRAM(S): 2.5 TABLET ORAL at 05:12

## 2023-08-11 RX ADMIN — MIDODRINE HYDROCHLORIDE 10 MILLIGRAM(S): 2.5 TABLET ORAL at 13:38

## 2023-08-11 RX ADMIN — RALOXIFENE HYDROCHLORIDE 60 MILLIGRAM(S): 60 TABLET, COATED ORAL at 11:35

## 2023-08-11 RX ADMIN — PANTOPRAZOLE SODIUM 40 MILLIGRAM(S): 20 TABLET, DELAYED RELEASE ORAL at 05:12

## 2023-08-11 RX ADMIN — MEROPENEM 100 MILLIGRAM(S): 1 INJECTION INTRAVENOUS at 13:38

## 2023-08-11 RX ADMIN — POLYETHYLENE GLYCOL 3350 17 GRAM(S): 17 POWDER, FOR SOLUTION ORAL at 11:38

## 2023-08-11 RX ADMIN — GABAPENTIN 600 MILLIGRAM(S): 400 CAPSULE ORAL at 11:35

## 2023-08-11 RX ADMIN — Medication 1 TABLET(S): at 12:03

## 2023-08-11 RX ADMIN — MEROPENEM 100 MILLIGRAM(S): 1 INJECTION INTRAVENOUS at 05:11

## 2023-08-11 NOTE — DISCHARGE NOTE PROVIDER - NSDCFUSCHEDAPPT_GEN_ALL_CORE_FT
Behuria, Supreeti  [facility] PreAdmits  Scheduled Appointment: 10/17/2023    [facility] Physician [facility]  CARDIOLOGY  [place]   Scheduled Appointment: 10/17/2023

## 2023-08-11 NOTE — DISCHARGE NOTE PROVIDER - NSDCMRMEDTOKEN_GEN_ALL_CORE_FT
gabapentin 600 mg oral tablet: 1 orally once a day  melatonin 5 mg oral tablet: 1 tab(s) orally once a day (at bedtime)  midodrine 10 mg oral tablet: 1 tab(s) orally 3 times a day  Multiple Vitamins oral tablet: 1 tab(s) orally once a day  raloxifene 60 mg oral tablet: 1 tab(s) orally once a day   gabapentin 600 mg oral tablet: 1 orally once a day  Macrobid 100 mg oral capsule: 1 cap(s) orally 2 times a day  melatonin 5 mg oral tablet: 1 tab(s) orally once a day (at bedtime)  midodrine 10 mg oral tablet: 1 tab(s) orally 3 times a day  Multiple Vitamins oral tablet: 1 tab(s) orally once a day  raloxifene 60 mg oral tablet: 1 tab(s) orally once a day   gabapentin 600 mg oral tablet: 1 orally once a day  Macrobid 100 mg oral capsule: 1 cap(s) orally 2 times a day  melatonin 5 mg oral tablet: 1 tab(s) orally once a day (at bedtime)  midodrine 10 mg oral tablet: 1 tab(s) orally 3 times a day  Multiple Vitamins oral tablet: 1 tab(s) orally once a day  pantoprazole 40 mg oral delayed release tablet: 1 tab(s) orally once a day  raloxifene 60 mg oral tablet: 1 tab(s) orally once a day

## 2023-08-11 NOTE — DISCHARGE NOTE NURSING/CASE MANAGEMENT/SOCIAL WORK - PATIENT PORTAL LINK FT
You can access the FollowMyHealth Patient Portal offered by [facility] by registering at the following website: [email or web address]. By joining [facility]’s FollowMyHealth portal, you will also be able to view your health information using other applications (apps) compatible with our system.

## 2023-08-11 NOTE — DISCHARGE NOTE PROVIDER - PROVIDER TOKENS
PROVIDER:[TOKEN:[[id number]],FOLLOWUP:[2 weeks]],PROVIDER:[TOKEN:[[id number]],FOLLOWUP:[2 weeks]]

## 2023-08-11 NOTE — DISCHARGE NOTE NURSING/CASE MANAGEMENT/SOCIAL WORK - NSDCPEFALRISK_GEN_ALL_CORE
For information on Fall & Injury Prevention, visit: [email or web address] OR  [email or web address] OR  https://www.cdc.gov/steadi/patient.html

## 2023-08-11 NOTE — DISCHARGE NOTE PROVIDER - CARE PROVIDER_API CALL
Maggie Crow  Internal Medicine  [address] [place] [id number]  Phone: [phone]  Fax: [phone]  Follow Up Time: 2 weeks    Vince Rivera  Gastroenterology  [address] [place] [address]  Phone: [phone]  Fax: [phone]  Follow Up Time: 2 weeks

## 2023-08-11 NOTE — DISCHARGE NOTE PROVIDER - ATTENDING DISCHARGE PHYSICAL EXAMINATION:
T(F): , Max: 98.3 (08-11-23 @ 05:00)  HR: 108 (08-11-23 @ 13:08) (99 - 108)  BP: 118/71 (08-11-23 @ 13:08)  RR: 18 (08-11-23 @ 13:08)  SpO2: 97% (08-11-23 @ 05:05)  General: No apparent distress  Cardiovascular: S1, S2  Gastrointestinal: Soft, Non-tender, Non-distended  Respiratory: Good air entry bilaterally  Musculoskeletal: Moves all extremities  Lymphatic: No edema  Neurologic: baseline MR  Dermatologic: Skin dry                          9.3    7.17  )-----------( 400      ( 10 Aug 2023 08:46 )             29.3     08-10    140  |  104  |  16  ----------------------------<  107<H>  4.3   |  26  |  0.8    Ca    8.4      10 Aug 2023 08:46  Mg     2.2     08-10    TPro  6.3  /  Alb  3.3<L>  /  TBili  <0.2  /  DBili  x   /  AST  16  /  ALT  13  /  AlkPhos  86  08-10

## 2023-08-11 NOTE — DISCHARGE NOTE PROVIDER - CARE PROVIDERS DIRECT ADDRESSES
,DirectAddress_Unknown,[email or web address]

## 2023-08-11 NOTE — CHART NOTE - NSCHARTNOTEFT_GEN_A_CORE
Brief Nutrition Notes    Per H&P, pt is a 59 y/o female w/ PMHx of Down syndrome, nonverbal at baseline, hypothyroidism, cerebral palsy, congenital pulmonary stenosis brought in by EMS from [facility] for 1 episode of coffee-ground emesis and hypoxia just prior to arrival.     Dosing weight is 50.9 KG; BMI 25.2 (overweight range). Current diet order is pureed with thin liquids. Per staff, pt has good appetite; consuming >75% of meals provided in-house. Total feed required. Tolerating diet texture/consistency well. No nausea or vomiting reported. Stage I pressure injury to left/right buttocks and right heel -- no interventions warranted at this time as wounds are stage I and pt has good appetite. No edema noted. Last BM on 8/6 per flow sheet.     Pt is at low nutrition risk; will f/u in 7-10 days or prn.    RD to remain available: Audelia Centeno [phone] or TEAMS

## 2023-08-11 NOTE — DISCHARGE NOTE PROVIDER - NSDCCPCAREPLAN_GEN_ALL_CORE_FT
PRINCIPAL DISCHARGE DIAGNOSIS  Diagnosis: Sepsis due to urinary tract infection  Assessment and Plan of Treatment:   You were noted either on arrival or during this hospitalization to have a Urinary Tract Infection. You may have already been treated and completed the antibiotics, please refer to the list of medications present on your discharge paperwork. If you notice that there are antibiotics listed, these may be to treat your infection, be sure to complete taking the full course, whether you have symptoms or not, as prescribed.  While taking antibiotics, you may benefit from taking a probiotic such as florastore to help to try and prevent an infectious type of diarrhea known as C Diff. If you notice that you begin having severe watery diarrhea, more than 4-5 episodes a day, please see your Primary Care Doctor or come to the ER to have your stool tested for this infection.   It is not necessary to repeat a urine test to see if the infection is gone, it is assumed that after treatment it should have resolved. However, if you continue to have symptoms, please see your Primary Care doctor or return to the ER.

## 2023-08-11 NOTE — DISCHARGE NOTE PROVIDER - HOSPITAL COURSE
59yo F w/a PMH of Down syndrome, nonverbal at baseline, hypothyroidism, cerebral palsy, congenital pulmonary stenosis brought in by EMS from [facility] for 1 episode of coffee-ground emesis and hypoxia just prior to arrival.      Imaging -  CT Chest and CTAP   - Bilateral bronchial wall thickening with lower lobe airway narrowing and bilateral patchy airspace opacities with more consolidative change in the left hilar and lower lobe region. Findings may be infectious/inflammatory. Correlate for multifocal pneumonia.  - Mildly enlarged mediastinal lymph nodes, nonspecific, possibly reactive. Follow-up posttreatment is recommended to ensure resolution of pulmonary findings in lymphadenopathy as the possibility of underlying neoplasm is not excluded.  - Moderate size hiatal hernia and wall thickening of the mid to lower esophagus. Findings may reflect esophagitis. Nonemergent direct visualization can be obtained for further evaluation.  - Urinary bladder appears mildly wall thickening. Correlate with urinalysis to exclude cystitis.  - Hepatic steatosis.   57yo F w/a PMH of Down syndrome, nonverbal at baseline, hypothyroidism, cerebral palsy, congenital pulmonary stenosis brought in by EMS from assisted for 1 episode of coffee-ground emesis and hypoxia just prior to arrival.      Imaging -  CT Chest and CTAP showed multifocal pneumonia,  Moderate size hiatal hernia and hepatic steatosis    Pt was evaluated and treated for the following conditions:    #Hematemesis (CTAP showed hiatal hernia, reactive LNs)  -  will be dced on po protonix   - needs to fu w GI for EGD that can be done as outpatient    #Hypoxia 2/2 multifocal pneumonia   - MRSA and RVP negative  - sp IV Ceftriaxone, Azithromycin and meropenem  - willbe dced on 4d of macrobid     #Urinary Tract infection  - on Ceftriaxone   - Urine Cx = >100k E. Coli sensitivity to meropenem   and blood cultures - NGTD  - UA+, UC grew ESBL E. coli (see results for sensitivities)  - CT CAP revealed evidence of MF PNA and cystitis  - s/p vancomycin, cefepime, aztreonam and rocephin  - Lactate trended down to 1.1  - WBC trending down to 6 today  - Meropenem 1g Q8  - Macrobid 100mg BID x to complete 7 days PO on discharge    #Down syndrome  #Congential P stenosis  TTE:  1. Suboptimal and very limited study.   2. Left ventricular ejection fraction, by visual estimation, is >55%.   3. Normal global left ventricular systolic function.   4. The left ventricular diastolic function could not be assessed in this   study.   5. Trivial pericardial effusion.    #Osteoporosis  - Raloxifene     #Peripheral neuropathy  - Gapapentin    #Hypothyroidism  - TSH - 1.63     57yo F w/a PMH of Down syndrome, nonverbal at baseline, hypothyroidism, cerebral palsy, congenital pulmonary stenosis brought in by EMS from retirement for 1 episode of coffee-ground emesis and hypoxia just prior to arrival.      Imaging -  CT Chest and CTAP showed multifocal pneumonia,  Moderate size hiatal hernia and hepatic steatosis    Pt was evaluated and treated for the following conditions:    #Hematemesis (CTAP showed hiatal hernia, reactive LNs)  -  will be dced on po protonix   - needs to fu w GI for EGD that can be done as outpatient    #Hypoxia 2/2 multifocal pneumonia   - MRSA and RVP negative  - sp IV Ceftriaxone, Azithromycin     #Urinary Tract infection  - Urine Cx = >100k E. Coli sensitivity to meropenem   and blood cultures - NGTD  - sp Meropenem 1g Q8  - will be dced on Macrobid 100mg BID x to complete 7 days PO on discharge (5 more days)    #Down syndrome  #Congential P stenosis  TTE:  1. Suboptimal and very limited study.   2. Left ventricular ejection fraction, by visual estimation, is >55%.   3. Normal global left ventricular systolic function.   4. The left ventricular diastolic function could not be assessed in this   study.   5. Trivial pericardial effusion.    #Osteoporosis  - Raloxifene     #Peripheral neuropathy  - Gapapentin    #Hypothyroidism  - TSH - 1.63

## 2023-08-12 ENCOUNTER — INPATIENT (INPATIENT)
Facility: HOSPITAL | Age: 58
LOS: 5 days | Discharge: HOME | DRG: 871 | End: 2023-08-18
Attending: INTERNAL MEDICINE | Admitting: INTERNAL MEDICINE
Payer: COMMERCIAL

## 2023-08-12 VITALS — TEMPERATURE: 105 F

## 2023-08-12 DIAGNOSIS — A41.9 SEPSIS, UNSPECIFIED ORGANISM: ICD-10-CM

## 2023-08-12 DIAGNOSIS — Z98.890 OTHER SPECIFIED POSTPROCEDURAL STATES: Chronic | ICD-10-CM

## 2023-08-12 LAB
ALBUMIN SERPL ELPH-MCNC: 3.3 G/DL — LOW (ref 3.5–5.2)
ALBUMIN SERPL ELPH-MCNC: 3.4 G/DL — LOW (ref 3.5–5.2)
ALP SERPL-CCNC: 85 U/L — SIGNIFICANT CHANGE UP (ref 30–115)
ALP SERPL-CCNC: 88 U/L — SIGNIFICANT CHANGE UP (ref 30–115)
ALT FLD-CCNC: 17 U/L — SIGNIFICANT CHANGE UP (ref 0–41)
ALT FLD-CCNC: 19 U/L — SIGNIFICANT CHANGE UP (ref 0–41)
ANION GAP SERPL CALC-SCNC: 12 MMOL/L — SIGNIFICANT CHANGE UP (ref 7–14)
ANION GAP SERPL CALC-SCNC: 13 MMOL/L — SIGNIFICANT CHANGE UP (ref 7–14)
APPEARANCE UR: ABNORMAL
APTT BLD: 25.8 SEC — LOW (ref 27–39.2)
AST SERPL-CCNC: 27 U/L — SIGNIFICANT CHANGE UP (ref 0–41)
AST SERPL-CCNC: 33 U/L — SIGNIFICANT CHANGE UP (ref 0–41)
BACTERIA # UR AUTO: ABNORMAL /HPF
BASE EXCESS BLDV CALC-SCNC: 2.2 MMOL/L — SIGNIFICANT CHANGE UP (ref -2–3)
BASOPHILS # BLD AUTO: 0 K/UL — SIGNIFICANT CHANGE UP (ref 0–0.2)
BASOPHILS # BLD AUTO: 0.04 K/UL — SIGNIFICANT CHANGE UP (ref 0–0.2)
BASOPHILS # BLD AUTO: 0.07 K/UL — SIGNIFICANT CHANGE UP (ref 0–0.2)
BASOPHILS NFR BLD AUTO: 0 % — SIGNIFICANT CHANGE UP (ref 0–1)
BASOPHILS NFR BLD AUTO: 0.1 % — SIGNIFICANT CHANGE UP (ref 0–1)
BASOPHILS NFR BLD AUTO: 0.5 % — SIGNIFICANT CHANGE UP (ref 0–1)
BILIRUB SERPL-MCNC: 0.3 MG/DL — SIGNIFICANT CHANGE UP (ref 0.2–1.2)
BILIRUB SERPL-MCNC: <0.2 MG/DL — SIGNIFICANT CHANGE UP (ref 0.2–1.2)
BILIRUB UR-MCNC: NEGATIVE — SIGNIFICANT CHANGE UP
BUN SERPL-MCNC: 16 MG/DL — SIGNIFICANT CHANGE UP (ref 10–20)
BUN SERPL-MCNC: 19 MG/DL — SIGNIFICANT CHANGE UP (ref 10–20)
CA-I SERPL-SCNC: 1.13 MMOL/L — LOW (ref 1.15–1.33)
CALCIUM SERPL-MCNC: 8.5 MG/DL — SIGNIFICANT CHANGE UP (ref 8.4–10.4)
CALCIUM SERPL-MCNC: 8.9 MG/DL — SIGNIFICANT CHANGE UP (ref 8.4–10.5)
CAST: >63 /LPF — HIGH (ref 0–4)
CHLORIDE SERPL-SCNC: 100 MMOL/L — SIGNIFICANT CHANGE UP (ref 98–110)
CHLORIDE SERPL-SCNC: 102 MMOL/L — SIGNIFICANT CHANGE UP (ref 98–110)
CO2 SERPL-SCNC: 23 MMOL/L — SIGNIFICANT CHANGE UP (ref 17–32)
CO2 SERPL-SCNC: 25 MMOL/L — SIGNIFICANT CHANGE UP (ref 17–32)
COLOR SPEC: SIGNIFICANT CHANGE UP
CREAT SERPL-MCNC: 0.9 MG/DL — SIGNIFICANT CHANGE UP (ref 0.7–1.5)
CREAT SERPL-MCNC: 1 MG/DL — SIGNIFICANT CHANGE UP (ref 0.7–1.5)
DIFF PNL FLD: ABNORMAL
EGFR: 66 ML/MIN/1.73M2 — SIGNIFICANT CHANGE UP
EGFR: 75 ML/MIN/1.73M2 — SIGNIFICANT CHANGE UP
EOSINOPHIL # BLD AUTO: 0 K/UL — SIGNIFICANT CHANGE UP (ref 0–0.7)
EOSINOPHIL # BLD AUTO: 0 K/UL — SIGNIFICANT CHANGE UP (ref 0–0.7)
EOSINOPHIL # BLD AUTO: 0.07 K/UL — SIGNIFICANT CHANGE UP (ref 0–0.7)
EOSINOPHIL NFR BLD AUTO: 0 % — SIGNIFICANT CHANGE UP (ref 0–8)
EOSINOPHIL NFR BLD AUTO: 0 % — SIGNIFICANT CHANGE UP (ref 0–8)
EOSINOPHIL NFR BLD AUTO: 0.5 % — SIGNIFICANT CHANGE UP (ref 0–8)
GAS PNL BLDV: 134 MMOL/L — LOW (ref 136–145)
GAS PNL BLDV: SIGNIFICANT CHANGE UP
GAS PNL BLDV: SIGNIFICANT CHANGE UP
GIANT PLATELETS BLD QL SMEAR: PRESENT — SIGNIFICANT CHANGE UP
GLUCOSE SERPL-MCNC: 193 MG/DL — HIGH (ref 70–99)
GLUCOSE SERPL-MCNC: 226 MG/DL — HIGH (ref 70–99)
GLUCOSE UR QL: NEGATIVE MG/DL — SIGNIFICANT CHANGE UP
HCG SERPL QL: NEGATIVE — SIGNIFICANT CHANGE UP
HCO3 BLDV-SCNC: 26 MMOL/L — SIGNIFICANT CHANGE UP (ref 22–29)
HCT VFR BLD CALC: 26.7 % — LOW (ref 37–47)
HCT VFR BLD CALC: 29.2 % — LOW (ref 37–47)
HCT VFR BLD CALC: 31.6 % — LOW (ref 37–47)
HCT VFR BLDA CALC: 30 % — LOW (ref 39–51)
HGB BLD CALC-MCNC: 10.1 G/DL — LOW (ref 12.6–17.4)
HGB BLD-MCNC: 8.5 G/DL — LOW (ref 12–16)
HGB BLD-MCNC: 9.2 G/DL — LOW (ref 12–16)
HGB BLD-MCNC: 9.9 G/DL — LOW (ref 12–16)
HYALINE CASTS # UR AUTO: >63 /LPF — HIGH (ref 0–4)
IMM GRANULOCYTES NFR BLD AUTO: 0.4 % — HIGH (ref 0.1–0.3)
IMM GRANULOCYTES NFR BLD AUTO: 0.7 % — HIGH (ref 0.1–0.3)
INR BLD: 1.02 RATIO — SIGNIFICANT CHANGE UP (ref 0.65–1.3)
KETONES UR-MCNC: ABNORMAL MG/DL
LACTATE BLDV-MCNC: 2.9 MMOL/L — HIGH (ref 0.5–2)
LACTATE SERPL-SCNC: 1.4 MMOL/L — SIGNIFICANT CHANGE UP (ref 0.7–2)
LACTATE SERPL-SCNC: 2.8 MMOL/L — HIGH (ref 0.7–2)
LACTATE SERPL-SCNC: 4.4 MMOL/L — CRITICAL HIGH (ref 0.7–2)
LEUKOCYTE ESTERASE UR-ACNC: ABNORMAL
LYMPHOCYTES # BLD AUTO: 0 % — LOW (ref 20.5–51.1)
LYMPHOCYTES # BLD AUTO: 0 K/UL — LOW (ref 1.2–3.4)
LYMPHOCYTES # BLD AUTO: 1.04 K/UL — LOW (ref 1.2–3.4)
LYMPHOCYTES # BLD AUTO: 1.37 K/UL — SIGNIFICANT CHANGE UP (ref 1.2–3.4)
LYMPHOCYTES # BLD AUTO: 5.1 % — LOW (ref 20.5–51.1)
LYMPHOCYTES # BLD AUTO: 7.4 % — LOW (ref 20.5–51.1)
MAGNESIUM SERPL-MCNC: 2.1 MG/DL — SIGNIFICANT CHANGE UP (ref 1.8–2.4)
MANUAL SMEAR VERIFICATION: SIGNIFICANT CHANGE UP
MCHC RBC-ENTMCNC: 25.1 PG — LOW (ref 27–31)
MCHC RBC-ENTMCNC: 25.4 PG — LOW (ref 27–31)
MCHC RBC-ENTMCNC: 25.7 PG — LOW (ref 27–31)
MCHC RBC-ENTMCNC: 31.3 G/DL — LOW (ref 32–37)
MCHC RBC-ENTMCNC: 31.5 G/DL — LOW (ref 32–37)
MCHC RBC-ENTMCNC: 31.8 G/DL — LOW (ref 32–37)
MCV RBC AUTO: 79.9 FL — LOW (ref 81–99)
MCV RBC AUTO: 80 FL — LOW (ref 81–99)
MCV RBC AUTO: 81.6 FL — SIGNIFICANT CHANGE UP (ref 81–99)
MONOCYTES # BLD AUTO: 0.24 K/UL — SIGNIFICANT CHANGE UP (ref 0.1–0.6)
MONOCYTES # BLD AUTO: 0.31 K/UL — SIGNIFICANT CHANGE UP (ref 0.1–0.6)
MONOCYTES # BLD AUTO: 0.39 K/UL — SIGNIFICANT CHANGE UP (ref 0.1–0.6)
MONOCYTES NFR BLD AUTO: 0.9 % — LOW (ref 1.7–9.3)
MONOCYTES NFR BLD AUTO: 1.4 % — LOW (ref 1.7–9.3)
MONOCYTES NFR BLD AUTO: 1.7 % — SIGNIFICANT CHANGE UP (ref 1.7–9.3)
NEUTROPHILS # BLD AUTO: 12.64 K/UL — HIGH (ref 1.4–6.5)
NEUTROPHILS # BLD AUTO: 25.08 K/UL — HIGH (ref 1.4–6.5)
NEUTROPHILS # BLD AUTO: 34.35 K/UL — HIGH (ref 1.4–6.5)
NEUTROPHILS NFR BLD AUTO: 89.5 % — HIGH (ref 42.2–75.2)
NEUTROPHILS NFR BLD AUTO: 92.7 % — HIGH (ref 42.2–75.2)
NEUTROPHILS NFR BLD AUTO: 98.2 % — HIGH (ref 42.2–75.2)
NEUTS BAND # BLD: 0.9 % — SIGNIFICANT CHANGE UP (ref 0–6)
NITRITE UR-MCNC: NEGATIVE — SIGNIFICANT CHANGE UP
NRBC # BLD: 0 /100 WBCS — SIGNIFICANT CHANGE UP (ref 0–0)
NRBC # BLD: 0 /100 WBCS — SIGNIFICANT CHANGE UP (ref 0–0)
PCO2 BLDV: 39 MMHG — SIGNIFICANT CHANGE UP (ref 39–42)
PH BLDV: 7.44 — HIGH (ref 7.32–7.43)
PH UR: 5.5 — SIGNIFICANT CHANGE UP (ref 5–8)
PLAT MORPH BLD: NORMAL — SIGNIFICANT CHANGE UP
PLATELET # BLD AUTO: 348 K/UL — SIGNIFICANT CHANGE UP (ref 130–400)
PLATELET # BLD AUTO: 420 K/UL — HIGH (ref 130–400)
PLATELET # BLD AUTO: 454 K/UL — HIGH (ref 130–400)
PMV BLD: 9.6 FL — SIGNIFICANT CHANGE UP (ref 7.4–10.4)
PMV BLD: 9.6 FL — SIGNIFICANT CHANGE UP (ref 7.4–10.4)
PMV BLD: 9.8 FL — SIGNIFICANT CHANGE UP (ref 7.4–10.4)
PO2 BLDV: 51 MMHG — SIGNIFICANT CHANGE UP
POLYCHROMASIA BLD QL SMEAR: SLIGHT — SIGNIFICANT CHANGE UP
POTASSIUM BLDV-SCNC: 4.7 MMOL/L — SIGNIFICANT CHANGE UP (ref 3.5–5.1)
POTASSIUM SERPL-MCNC: 4.4 MMOL/L — SIGNIFICANT CHANGE UP (ref 3.5–5)
POTASSIUM SERPL-MCNC: 4.7 MMOL/L — SIGNIFICANT CHANGE UP (ref 3.5–5)
POTASSIUM SERPL-SCNC: 4.4 MMOL/L — SIGNIFICANT CHANGE UP (ref 3.5–5)
POTASSIUM SERPL-SCNC: 4.7 MMOL/L — SIGNIFICANT CHANGE UP (ref 3.5–5)
PROT SERPL-MCNC: 6.4 G/DL — SIGNIFICANT CHANGE UP (ref 6–8)
PROT SERPL-MCNC: 6.5 G/DL — SIGNIFICANT CHANGE UP (ref 6–8)
PROT UR-MCNC: 100 MG/DL
PROTHROM AB SERPL-ACNC: 11.6 SEC — SIGNIFICANT CHANGE UP (ref 9.95–12.87)
RAPID RVP RESULT: SIGNIFICANT CHANGE UP
RBC # BLD: 3.34 M/UL — LOW (ref 4.2–5.4)
RBC # BLD: 3.58 M/UL — LOW (ref 4.2–5.4)
RBC # BLD: 3.95 M/UL — LOW (ref 4.2–5.4)
RBC # FLD: 15.4 % — HIGH (ref 11.5–14.5)
RBC # FLD: 15.5 % — HIGH (ref 11.5–14.5)
RBC # FLD: 15.6 % — HIGH (ref 11.5–14.5)
RBC BLD AUTO: ABNORMAL
RBC CASTS # UR COMP ASSIST: 3 /HPF — SIGNIFICANT CHANGE UP (ref 0–4)
SAO2 % BLDV: 80.7 % — SIGNIFICANT CHANGE UP
SARS-COV-2 RNA SPEC QL NAA+PROBE: SIGNIFICANT CHANGE UP
SODIUM SERPL-SCNC: 137 MMOL/L — SIGNIFICANT CHANGE UP (ref 135–146)
SODIUM SERPL-SCNC: 138 MMOL/L — SIGNIFICANT CHANGE UP (ref 135–146)
SP GR SPEC: >1.03 — HIGH (ref 1–1.03)
SPHEROCYTES BLD QL SMEAR: SLIGHT — SIGNIFICANT CHANGE UP
SQUAMOUS # UR AUTO: 5 /HPF — SIGNIFICANT CHANGE UP (ref 0–5)
UROBILINOGEN FLD QL: 1 MG/DL — SIGNIFICANT CHANGE UP (ref 0.2–1)
WBC # BLD: 14.12 K/UL — HIGH (ref 4.8–10.8)
WBC # BLD: 27.08 K/UL — HIGH (ref 4.8–10.8)
WBC # BLD: 34.66 K/UL — HIGH (ref 4.8–10.8)
WBC # FLD AUTO: 14.12 K/UL — HIGH (ref 4.8–10.8)
WBC # FLD AUTO: 27.08 K/UL — HIGH (ref 4.8–10.8)
WBC # FLD AUTO: 34.66 K/UL — HIGH (ref 4.8–10.8)
WBC UR QL: >998 /HPF — HIGH (ref 0–5)

## 2023-08-12 PROCEDURE — 87641 MR-STAPH DNA AMP PROBE: CPT

## 2023-08-12 PROCEDURE — 71045 X-RAY EXAM CHEST 1 VIEW: CPT

## 2023-08-12 PROCEDURE — 80048 BASIC METABOLIC PNL TOTAL CA: CPT

## 2023-08-12 PROCEDURE — 71045 X-RAY EXAM CHEST 1 VIEW: CPT | Mod: 26

## 2023-08-12 PROCEDURE — 83605 ASSAY OF LACTIC ACID: CPT

## 2023-08-12 PROCEDURE — 83735 ASSAY OF MAGNESIUM: CPT

## 2023-08-12 PROCEDURE — 87640 STAPH A DNA AMP PROBE: CPT

## 2023-08-12 PROCEDURE — 99291 CRITICAL CARE FIRST HOUR: CPT

## 2023-08-12 PROCEDURE — 36415 COLL VENOUS BLD VENIPUNCTURE: CPT

## 2023-08-12 PROCEDURE — 93010 ELECTROCARDIOGRAM REPORT: CPT

## 2023-08-12 PROCEDURE — 84145 PROCALCITONIN (PCT): CPT

## 2023-08-12 PROCEDURE — 99053 MED SERV 10PM-8AM 24 HR FAC: CPT

## 2023-08-12 PROCEDURE — 71046 X-RAY EXAM CHEST 2 VIEWS: CPT

## 2023-08-12 PROCEDURE — 74177 CT ABD & PELVIS W/CONTRAST: CPT | Mod: 26,MA

## 2023-08-12 PROCEDURE — 74230 X-RAY XM SWLNG FUNCJ C+: CPT

## 2023-08-12 PROCEDURE — G0378: CPT

## 2023-08-12 PROCEDURE — 80053 COMPREHEN METABOLIC PANEL: CPT

## 2023-08-12 PROCEDURE — 85025 COMPLETE CBC W/AUTO DIFF WBC: CPT

## 2023-08-12 PROCEDURE — 92611 MOTION FLUOROSCOPY/SWALLOW: CPT | Mod: GN

## 2023-08-12 PROCEDURE — C9113: CPT

## 2023-08-12 PROCEDURE — 85027 COMPLETE CBC AUTOMATED: CPT

## 2023-08-12 PROCEDURE — 92526 ORAL FUNCTION THERAPY: CPT | Mod: GN

## 2023-08-12 PROCEDURE — 99223 1ST HOSP IP/OBS HIGH 75: CPT

## 2023-08-12 RX ORDER — PANTOPRAZOLE SODIUM 20 MG/1
40 TABLET, DELAYED RELEASE ORAL
Refills: 0 | Status: DISCONTINUED | OUTPATIENT
Start: 2023-08-12 | End: 2023-08-12

## 2023-08-12 RX ORDER — PANTOPRAZOLE SODIUM 20 MG/1
40 TABLET, DELAYED RELEASE ORAL DAILY
Refills: 0 | Status: DISCONTINUED | OUTPATIENT
Start: 2023-08-12 | End: 2023-08-18

## 2023-08-12 RX ORDER — SODIUM CHLORIDE 9 MG/ML
1750 INJECTION, SOLUTION INTRAVENOUS ONCE
Refills: 0 | Status: COMPLETED | OUTPATIENT
Start: 2023-08-12 | End: 2023-08-12

## 2023-08-12 RX ORDER — VANCOMYCIN HCL 1 G
1000 VIAL (EA) INTRAVENOUS EVERY 12 HOURS
Refills: 0 | Status: DISCONTINUED | OUTPATIENT
Start: 2023-08-12 | End: 2023-08-12

## 2023-08-12 RX ORDER — MEROPENEM 1 G/30ML
1000 INJECTION INTRAVENOUS EVERY 8 HOURS
Refills: 0 | Status: COMPLETED | OUTPATIENT
Start: 2023-08-12 | End: 2023-08-17

## 2023-08-12 RX ORDER — IPRATROPIUM/ALBUTEROL SULFATE 18-103MCG
3 AEROSOL WITH ADAPTER (GRAM) INHALATION
Refills: 0 | Status: COMPLETED | OUTPATIENT
Start: 2023-08-12 | End: 2023-08-12

## 2023-08-12 RX ORDER — RALOXIFENE HYDROCHLORIDE 60 MG/1
60 TABLET, COATED ORAL DAILY
Refills: 0 | Status: DISCONTINUED | OUTPATIENT
Start: 2023-08-12 | End: 2023-08-18

## 2023-08-12 RX ORDER — CEFEPIME 1 G/1
2000 INJECTION, POWDER, FOR SOLUTION INTRAMUSCULAR; INTRAVENOUS ONCE
Refills: 0 | Status: COMPLETED | OUTPATIENT
Start: 2023-08-12 | End: 2023-08-12

## 2023-08-12 RX ORDER — GABAPENTIN 400 MG/1
600 CAPSULE ORAL DAILY
Refills: 0 | Status: DISCONTINUED | OUTPATIENT
Start: 2023-08-12 | End: 2023-08-18

## 2023-08-12 RX ORDER — LANOLIN ALCOHOL/MO/W.PET/CERES
5 CREAM (GRAM) TOPICAL AT BEDTIME
Refills: 0 | Status: DISCONTINUED | OUTPATIENT
Start: 2023-08-12 | End: 2023-08-18

## 2023-08-12 RX ORDER — MIDODRINE HYDROCHLORIDE 2.5 MG/1
10 TABLET ORAL THREE TIMES A DAY
Refills: 0 | Status: DISCONTINUED | OUTPATIENT
Start: 2023-08-12 | End: 2023-08-12

## 2023-08-12 RX ORDER — NOREPINEPHRINE BITARTRATE/D5W 8 MG/250ML
0.05 PLASTIC BAG, INJECTION (ML) INTRAVENOUS
Qty: 8 | Refills: 0 | Status: DISCONTINUED | OUTPATIENT
Start: 2023-08-12 | End: 2023-08-15

## 2023-08-12 RX ORDER — ENOXAPARIN SODIUM 100 MG/ML
40 INJECTION SUBCUTANEOUS EVERY 24 HOURS
Refills: 0 | Status: DISCONTINUED | OUTPATIENT
Start: 2023-08-12 | End: 2023-08-18

## 2023-08-12 RX ORDER — MEROPENEM 1 G/30ML
1000 INJECTION INTRAVENOUS ONCE
Refills: 0 | Status: COMPLETED | OUTPATIENT
Start: 2023-08-12 | End: 2023-08-12

## 2023-08-12 RX ORDER — ACETAMINOPHEN 500 MG
650 TABLET ORAL EVERY 6 HOURS
Refills: 0 | Status: DISCONTINUED | OUTPATIENT
Start: 2023-08-12 | End: 2023-08-18

## 2023-08-12 RX ORDER — VANCOMYCIN HCL 1 G
500 VIAL (EA) INTRAVENOUS EVERY 12 HOURS
Refills: 0 | Status: DISCONTINUED | OUTPATIENT
Start: 2023-08-12 | End: 2023-08-13

## 2023-08-12 RX ADMIN — Medication 3 MILLILITER(S): at 02:06

## 2023-08-12 RX ADMIN — Medication 5.53 MICROGRAM(S)/KG/MIN: at 22:27

## 2023-08-12 RX ADMIN — PANTOPRAZOLE SODIUM 40 MILLIGRAM(S): 20 TABLET, DELAYED RELEASE ORAL at 12:01

## 2023-08-12 RX ADMIN — Medication 5.53 MICROGRAM(S)/KG/MIN: at 07:06

## 2023-08-12 RX ADMIN — Medication 125 MILLIGRAM(S): at 01:06

## 2023-08-12 RX ADMIN — SODIUM CHLORIDE 1750 MILLILITER(S): 9 INJECTION, SOLUTION INTRAVENOUS at 01:07

## 2023-08-12 RX ADMIN — Medication 100 MILLIGRAM(S): at 17:30

## 2023-08-12 RX ADMIN — ENOXAPARIN SODIUM 40 MILLIGRAM(S): 100 INJECTION SUBCUTANEOUS at 08:30

## 2023-08-12 RX ADMIN — MEROPENEM 100 MILLIGRAM(S): 1 INJECTION INTRAVENOUS at 02:05

## 2023-08-12 RX ADMIN — Medication 3 MILLILITER(S): at 01:20

## 2023-08-12 RX ADMIN — CEFEPIME 100 MILLIGRAM(S): 1 INJECTION, POWDER, FOR SOLUTION INTRAMUSCULAR; INTRAVENOUS at 01:07

## 2023-08-12 RX ADMIN — MEROPENEM 100 MILLIGRAM(S): 1 INJECTION INTRAVENOUS at 08:30

## 2023-08-12 RX ADMIN — Medication 3 MILLILITER(S): at 01:06

## 2023-08-12 RX ADMIN — MEROPENEM 100 MILLIGRAM(S): 1 INJECTION INTRAVENOUS at 21:24

## 2023-08-12 RX ADMIN — MEROPENEM 100 MILLIGRAM(S): 1 INJECTION INTRAVENOUS at 15:30

## 2023-08-12 RX ADMIN — Medication 250 MILLIGRAM(S): at 07:35

## 2023-08-12 NOTE — CONSULT NOTE ADULT - SUBJECTIVE AND OBJECTIVE BOX
TEA ECHAVARRIA  58y, Female  Allergy: No Known Allergies      CHIEF COMPLAINT:       LOS      HPI  HPI:  Ms. Echavarria is a 58 year old Female with a PMHx of Down syndrome, nonverbal at baseline, hypothyroidism, cerebral palsy, congenital pulmonary stenosis brought in by EMS for SOB. Pt was discharged yesterday for UTI/PNA sepsis w/ EBSL on meropenem. Pt brought from NH for worsening SOB. As per EMS pt was saturating low to the 80s. She was placed on on 6L NC and her saturation went up to 96%. History was obtained from aide.    Vital Signs Last 12 Hrs  T(F): 101.8 (08-12-23 @ 02:40), Max: 104.9 (08-12-23 @ 00:45)  HR: 85 (08-12-23 @ 02:40) (64 - 128)  BP: 107/52 (08-12-23 @ 02:40) (107/52 - 159/68)  RR: 18 (08-12-23 @ 02:40) (18 - 22)  SpO2: 95% (08-12-23 @ 02:40) (91% - 95%)    Labs in the ED were significant for WBC 14, Lactate 2.6, +UA    CT A/P in the ED showed Diffuse urinary bladder wall thickening, compatible with urinary bladder cystitis, infection. No hydronephrosis or renal calculus. Increased right lower lobe, decreased left lower lobe airspace opacities, compatible with pneumonia in the appropriate clinical setting. Pt admitted to SDU for Sepsis 2/2 PNA.   (12 Aug 2023 05:49)      INFECTIOUS DISEASE HISTORY:  ID consulted for recent UTI  Recent prolonged hospital course- she was admitted with Severe sepsis on admission T>101 P>90 WBC 16--> 6 lactic acidosis, Shock requiring pressors  Initially had hypoxemic resp failure in the setting of coffee-ground emesis, CT with some opacities      8/4 UCX ESBL ecoli S macrobid  procalcitonin 7/92  8/4 BCX NGTD     < from: CT Abdomen and Pelvis w/ IV Cont (08.04.23 @ 03:49) >  Bilateral bronchial wall thickening with lower lobe airway narrowing and bilateral patchy airspace opacities with more consolidative change in the   left hilar and lower lobe region. Findings may be infectious/inflammatory. Correlate for multifocal pneumonia.  Mildly enlarged mediastinal lymph nodes, nonspecific, possibly reactive.   Follow-up posttreatment is recommended to ensure resolution of pulmonary findings in lymphadenopathy as the possibility of underlying neoplasm is   not excluded.Moderate size hiatal hernia and wall thickening of the mid to lower esophagus. Findings may reflect esophagitis. Nonemergent direct   visualization can be obtained for further evaluation.Urinary bladder appears mildly wall thickening. Correlate with urinalysis   to exclude cystitis.  Hepatic steatosis.    At the time ID had seen the patient she clinically improved, was Afebrile, normal WBC. She was given a few doses of meropenem and D/C on PO macrobid. Now readmitted for respiratory failure.     Currently ordered for:  meropenem  IVPB 1000 milliGRAM(s) IV Intermittent every 8 hours  vancomycin  IVPB 1000 milliGRAM(s) IV Intermittent every 12 hours      PMH  PAST MEDICAL & SURGICAL HISTORY:  Down syndrome      Osteoporosis      Mild anemia      Neuropathy      S/P debridement  of R hip on 3/2/21          FAMILY HISTORY  No pertinent family history in first degree relatives        SOCIAL HISTORY  Social History:  Unable to assess (19 Mar 2021 09:40)        ROS  ***    VITALS:  T(F): 101.8, Max: 104.9 (08-12-23 @ 00:45)  HR: 85  BP: 114/59  RR: 18Vital Signs Last 24 Hrs  T(C): 38.8 (12 Aug 2023 02:40), Max: 40.5 (12 Aug 2023 00:45)  T(F): 101.8 (12 Aug 2023 02:40), Max: 104.9 (12 Aug 2023 00:45)  HR: 85 (12 Aug 2023 02:40) (64 - 128)  BP: 114/59 (12 Aug 2023 07:05) (81/57 - 159/68)  BP(mean): --  RR: 18 (12 Aug 2023 02:40) (18 - 22)  SpO2: 95% (12 Aug 2023 02:40) (91% - 95%)    Parameters below as of 12 Aug 2023 00:41  Patient On (Oxygen Delivery Method): mask, nonrebreather  O2 Flow (L/min): 10      PHYSICAL EXAM:  ***    TESTS & MEASUREMENTS:                        9.9    14.12 )-----------( 454      ( 12 Aug 2023 01:03 )             31.6     08-12    137  |  102  |  19  ----------------------------<  193<H>  4.7   |  23  |  1.0    Ca    8.5      12 Aug 2023 01:03    TPro  6.5  /  Alb  3.3<L>  /  TBili  <0.2  /  DBili  x   /  AST  33  /  ALT  17  /  AlkPhos  88  08-12      LIVER FUNCTIONS - ( 12 Aug 2023 01:03 )  Alb: 3.3 g/dL / Pro: 6.5 g/dL / ALK PHOS: 88 U/L / ALT: 17 U/L / AST: 33 U/L / GGT: x           Urinalysis Basic - ( 12 Aug 2023 02:54 )    Color: Dark Yellow / Appearance: Turbid / SG: >1.030 / pH: x  Gluc: x / Ketone: Trace mg/dL  / Bili: Negative / Urobili: 1.0 mg/dL   Blood: x / Protein: 100 mg/dL / Nitrite: Negative   Leuk Esterase: Large / RBC: 3 /HPF / WBC >998 /HPF   Sq Epi: x / Non Sq Epi: 5 /HPF / Bacteria: Many /HPF        Culture - Urine (collected 08-04-23 @ 02:08)  Source: Clean Catch Clean Catch (Midstream)  Final Report (08-07-23 @ 18:10):    >100,000 CFU/ml Escherichia coli ESBL  Organism: Escherichia coli ESBL (08-07-23 @ 18:10)  Organism: Escherichia coli ESBL (08-07-23 @ 18:10)      Method Type: ZANE      -  Amikacin: S <=16      -  Amoxicillin/Clavulanic Acid: S <=8/4      -  Ampicillin: R >16 These ampicillin results predict results for amoxicillin      -  Ampicillin/Sulbactam: S <=4/2 Enterobacter, Klebsiella aerogenes, Citrobacter, and Serratia may develop resistance during prolonged therapy (3-4 days)      -  Aztreonam: R >16      -  Cefazolin: R >16 For uncomplicated UTI with K. pneumoniae, E. coli, or P. mirablis: ZANE <=16 is sensitive and ZANE >=32 is resistant. This also predicts results for oral agents cefaclor, cefdinir, cefpodoxime, cefprozil, cefuroxime axetil, cephalexin and locarbef for uncomplicated UTI. Note that some isolates may be susceptible to these agents while testing resistant to cefazolin.      -  Cefepime: R 4      -  Ceftriaxone: R >32 Enterobacter, Klebsiella aerogenes, Citrobacter, and Serratia may develop resistance during prolonged therapy      -  Cefuroxime: R >16      -  Ciprofloxacin: R >2      -  Ertapenem: S <=0.5      -  Gentamicin: S <=2      -  Imipenem: S <=1      -  Levofloxacin: R >4      -  Meropenem: S <=1      -  Nitrofurantoin: S <=32 Should not be used to treat pyelonephritis      -  Piperacillin/Tazobactam: S <=8      -  Tobramycin: S <=2      -  Trimethoprim/Sulfamethoxazole: R >2/38    Culture - Blood (collected 08-04-23 @ 01:05)  Source: .Blood Blood-Peripheral  Final Report (08-09-23 @ 09:01):    No growth at 5 days    Culture - Blood (collected 08-04-23 @ 01:05)  Source: .Blood Blood-Peripheral  Final Report (08-09-23 @ 09:01):    No growth at 5 days        Lactate, Blood: 2.8 mmol/L (08-12-23 @ 02:19)  Blood Gas Venous - Lactate: 2.90 mmol/L (08-12-23 @ 00:56)      INFECTIOUS DISEASES TESTING  Procalcitonin, Serum: 0.63 ng/mL (08-10-23 @ 08:46)  Procalcitonin, Serum: 7.82 ng/mL (08-04-23 @ 16:13)  MRSA PCR Result.: Negative (08-04-23 @ 14:52)  Rapid RVP Result: NotDetec (08-04-23 @ 03:00)      INFLAMMATORY MARKERS      RADIOLOGY & ADDITIONAL TESTS:  I have personally reviewed the last Chest xray  CXR  Xray Chest 1 View- PORTABLE-Urgent:   ACC: [id number] EXAM:  XR CHEST PORTABLE URGENT 1V   ORDERED BY: SEVERINO MARTINEZ     PROCEDURE DATE:  08/12/2023          INTERPRETATION:  CLINICAL HISTORY / REASON FOR EXAM: Shortness of breath.    COMPARISON: Chest radiograph from August 4, 2023.    TECHNIQUE/POSITIONING: Low lung volumes. Single image, AP portable chest   radiograph.    FINDINGS:    SUPPORT DEVICES: None.    CARDIAC/MEDIASTINUM/HILUM: Unchanged cardiac silhouette.    LUNG PARENCHYMA/PLEURA: Left basilar opacity. No pneumothorax.    SKELETON/SOFT TISSUES: Unchanged.      IMPRESSION:    Left basilar opacity.    --- End of Report ---            SOTO MACEDO MD; Attending Radiologist  This document has been electronically signed. Aug 12 2023 10:23AM (08-12-23 @ 01:32)      CT  CT Abdomen and Pelvis w/ IV Cont:   ACC: [id number] EXAM:  CT ABDOMEN AND PELVIS IC   ORDERED BY: SEVERINO MARTINEZ     PROCEDURE DATE:  08/12/2023          INTERPRETATION:  CLINICAL STATEMENT: Renal stone.    TECHNIQUE: Contiguous axial CT images were obtained from the lower chest   to thepubic symphysis following administration of 95 cc Omnipaque 350   intravenous contrast.  Oral contrast was not administered.  Reformatted   images in the coronal and sagittal planes were acquired. 5 cc contrast   discarded    Comparison made with CT abdomen and pelvis 8/4/2023.    FINDINGS:    LOWER CHEST: Increased right lower lobe, decreased left lower lobe   airspace opacities, compatible with pneumonia in the appropriate clinical   setting.    HEPATOBILIARY: Cholelithiasis. Liver unremarkable. No biliary dilation.    SPLEEN: Unremarkable.    PANCREAS: Unremarkable.    ADRENAL GLANDS: Unremarkable.    KIDNEYS: No hydronephrosis or renal calculus.    ABDOMINOPELVIC NODES: Unremarkable.    PELVIC ORGANS: Diffuse urinary bladder wall thickening, compatible with   urinary bladder infection. Uterus atrophic.    PERITONEUM/MESENTERY/BOWEL: Moderate hiatal hernia. No bowel obstruction.    BONES/SOFT TISSUES: Degenerative change, lumbar spine. Unchanged   anterolisthesis of L5 and S1.      IMPRESSION:  Since 8/4/2023:    Diffuse urinary bladder wall thickening, compatible with urinary bladder   cystitis, infection.    No hydronephrosis or renal calculus.    Increased right lower lobe, decreased left lower lobe airspace opacities,   compatible with pneumonia in the appropriate clinical setting.    --- End of Report ---            SHYANN GREENE MD; Attending Radiologist  This document has been electronically signed. Aug 12 2023  3:49AM (08-12-23 @ 04:12)      CARDIOLOGY TESTING  12 Lead ECG:   Ventricular Rate 114 BPM    Atrial Rate 114 BPM    P-R Interval 116 ms    QRS Duration 64 ms    Q-T Interval 314 ms    QTC Calculation(Bazett) 432 ms    P Axis 36 degrees    R Axis 66 degrees    T Axis 42 degrees    Diagnosis Line Sinus tachycardia  Otherwise normal ECG    Confirmed by Justice Stockton ([id number]) on 8/12/2023 10:23:41 AM (08-12-23 @ 01:08)  12 Lead ECG:   Ventricular Rate 105 BPM    Atrial Rate 105 BPM    P-R Interval 132 ms    QRS Duration 64 ms    Q-T Interval 330 ms    QTC Calculation(Bazett) 436 ms    P Axis 46 degrees    R Axis 36 degrees    T Axis 34 degrees    Diagnosis Line Sinus tachycardia  Otherwise normal ECG    Confirmed by MARJAN MENDES MD ([id number]) on 8/9/2023 7:34:13 AM (08-08-23 @ 22:06)      MEDICATIONS  enoxaparin Injectable 40 SubCutaneous every 24 hours  gabapentin 600 Oral daily  melatonin 5 Oral at bedtime  meropenem  IVPB 1000 IV Intermittent every 8 hours  multivitamin 1 Oral daily  norepinephrine Infusion 0.05 IV Continuous <Continuous>  pantoprazole  Injectable 40 IV Push daily  raloxifene 60 Oral daily  vancomycin  IVPB 1000 IV Intermittent every 12 hours        ANTIBIOTICS:  meropenem  IVPB 1000 milliGRAM(s) IV Intermittent every 8 hours  vancomycin  IVPB 1000 milliGRAM(s) IV Intermittent every 12 hours      ALLERGIES:  No Known Allergies         TEA ECHAVARRIA  58y, Female  Allergy: No Known Allergies      CHIEF COMPLAINT:       LOS      HPI  HPI:  Ms. Echavarria is a 58 year old Female with a PMHx of Down syndrome, nonverbal at baseline, hypothyroidism, cerebral palsy, congenital pulmonary stenosis brought in by EMS for SOB. Pt was discharged yesterday for UTI/PNA sepsis w/ EBSL on meropenem. Pt brought from NH for worsening SOB. As per EMS pt was saturating low to the 80s. She was placed on on 6L NC and her saturation went up to 96%. History was obtained from aide.    Vital Signs Last 12 Hrs  T(F): 101.8 (08-12-23 @ 02:40), Max: 104.9 (08-12-23 @ 00:45)  HR: 85 (08-12-23 @ 02:40) (64 - 128)  BP: 107/52 (08-12-23 @ 02:40) (107/52 - 159/68)  RR: 18 (08-12-23 @ 02:40) (18 - 22)  SpO2: 95% (08-12-23 @ 02:40) (91% - 95%)    Labs in the ED were significant for WBC 14, Lactate 2.6, +UA    CT A/P in the ED showed Diffuse urinary bladder wall thickening, compatible with urinary bladder cystitis, infection. No hydronephrosis or renal calculus. Increased right lower lobe, decreased left lower lobe airspace opacities, compatible with pneumonia in the appropriate clinical setting. Pt admitted to SDU for Sepsis 2/2 PNA.   (12 Aug 2023 05:49)      INFECTIOUS DISEASE HISTORY:  ID consulted for recent UTI  Recent prolonged hospital course- she was admitted with Severe sepsis on admission T>101 P>90 WBC 16--> 6 lactic acidosis, Shock requiring pressors  Initially had hypoxemic resp failure in the setting of coffee-ground emesis, CT with some opacities      8/4 UCX ESBL ecoli S macrobid  procalcitonin 7/92  8/4 BCX NGTD     < from: CT Abdomen and Pelvis w/ IV Cont (08.04.23 @ 03:49) >  Bilateral bronchial wall thickening with lower lobe airway narrowing and bilateral patchy airspace opacities with more consolidative change in the   left hilar and lower lobe region. Findings may be infectious/inflammatory. Correlate for multifocal pneumonia.  Mildly enlarged mediastinal lymph nodes, nonspecific, possibly reactive.   Follow-up posttreatment is recommended to ensure resolution of pulmonary findings in lymphadenopathy as the possibility of underlying neoplasm is   not excluded.Moderate size hiatal hernia and wall thickening of the mid to lower esophagus. Findings may reflect esophagitis. Nonemergent direct   visualization can be obtained for further evaluation.Urinary bladder appears mildly wall thickening. Correlate with urinalysis   to exclude cystitis.  Hepatic steatosis.    At the time ID had seen the patient she clinically improved, was Afebrile, normal WBC. She was given a few doses of meropenem and D/C on PO macrobid. Now readmitted for respiratory failure.     cannot obtain further history from the patient secondary to altered mental status or sedation    Currently ordered for:  meropenem  IVPB 1000 milliGRAM(s) IV Intermittent every 8 hours  vancomycin  IVPB 1000 milliGRAM(s) IV Intermittent every 12 hours      PMH  PAST MEDICAL & SURGICAL HISTORY:  Down syndrome      Osteoporosis      Mild anemia      Neuropathy      S/P debridement  of R hip on 3/2/21          FAMILY HISTORY  No pertinent family history in first degree relatives        SOCIAL HISTORY  Social History:  Unable to assess (19 Mar 2021 09:40)        ROS  unable to obtain history secondary to patient's mental status and/or sedation     VITALS:  T(F): 101.8, Max: 104.9 (08-12-23 @ 00:45)  HR: 85  BP: 114/59  RR: 18Vital Signs Last 24 Hrs  T(C): 38.8 (12 Aug 2023 02:40), Max: 40.5 (12 Aug 2023 00:45)  T(F): 101.8 (12 Aug 2023 02:40), Max: 104.9 (12 Aug 2023 00:45)  HR: 85 (12 Aug 2023 02:40) (64 - 128)  BP: 114/59 (12 Aug 2023 07:05) (81/57 - 159/68)  BP(mean): --  RR: 18 (12 Aug 2023 02:40) (18 - 22)  SpO2: 95% (12 Aug 2023 02:40) (91% - 95%)    Parameters below as of 12 Aug 2023 00:41  Patient On (Oxygen Delivery Method): mask, nonrebreather  O2 Flow (L/min): 10      PHYSICAL EXAM:  Gen: chronically ill appearing , face mask , contracted  CV: RRR mumur  Lungs: Decreased BS at bases  Abd: Soft, no rebound/guarding   Neuro: does not follow commands  Skin: no rash   Lines clean, no phlebitis     TESTS & MEASUREMENTS:                        9.9    14.12 )-----------( 454      ( 12 Aug 2023 01:03 )             31.6     08-12    137  |  102  |  19  ----------------------------<  193<H>  4.7   |  23  |  1.0    Ca    8.5      12 Aug 2023 01:03    TPro  6.5  /  Alb  3.3<L>  /  TBili  <0.2  /  DBili  x   /  AST  33  /  ALT  17  /  AlkPhos  88  08-12      LIVER FUNCTIONS - ( 12 Aug 2023 01:03 )  Alb: 3.3 g/dL / Pro: 6.5 g/dL / ALK PHOS: 88 U/L / ALT: 17 U/L / AST: 33 U/L / GGT: x           Urinalysis Basic - ( 12 Aug 2023 02:54 )    Color: Dark Yellow / Appearance: Turbid / SG: >1.030 / pH: x  Gluc: x / Ketone: Trace mg/dL  / Bili: Negative / Urobili: 1.0 mg/dL   Blood: x / Protein: 100 mg/dL / Nitrite: Negative   Leuk Esterase: Large / RBC: 3 /HPF / WBC >998 /HPF   Sq Epi: x / Non Sq Epi: 5 /HPF / Bacteria: Many /HPF        Culture - Urine (collected 08-04-23 @ 02:08)  Source: Clean Catch Clean Catch (Midstream)  Final Report (08-07-23 @ 18:10):    >100,000 CFU/ml Escherichia coli ESBL  Organism: Escherichia coli ESBL (08-07-23 @ 18:10)  Organism: Escherichia coli ESBL (08-07-23 @ 18:10)      Method Type: ZANE      -  Amikacin: S <=16      -  Amoxicillin/Clavulanic Acid: S <=8/4      -  Ampicillin: R >16 These ampicillin results predict results for amoxicillin      -  Ampicillin/Sulbactam: S <=4/2 Enterobacter, Klebsiella aerogenes, Citrobacter, and Serratia may develop resistance during prolonged therapy (3-4 days)      -  Aztreonam: R >16      -  Cefazolin: R >16 For uncomplicated UTI with K. pneumoniae, E. coli, or P. mirablis: ZANE <=16 is sensitive and ZANE >=32 is resistant. This also predicts results for oral agents cefaclor, cefdinir, cefpodoxime, cefprozil, cefuroxime axetil, cephalexin and locarbef for uncomplicated UTI. Note that some isolates may be susceptible to these agents while testing resistant to cefazolin.      -  Cefepime: R 4      -  Ceftriaxone: R >32 Enterobacter, Klebsiella aerogenes, Citrobacter, and Serratia may develop resistance during prolonged therapy      -  Cefuroxime: R >16      -  Ciprofloxacin: R >2      -  Ertapenem: S <=0.5      -  Gentamicin: S <=2      -  Imipenem: S <=1      -  Levofloxacin: R >4      -  Meropenem: S <=1      -  Nitrofurantoin: S <=32 Should not be used to treat pyelonephritis      -  Piperacillin/Tazobactam: S <=8      -  Tobramycin: S <=2      -  Trimethoprim/Sulfamethoxazole: R >2/38    Culture - Blood (collected 08-04-23 @ 01:05)  Source: .Blood Blood-Peripheral  Final Report (08-09-23 @ 09:01):    No growth at 5 days    Culture - Blood (collected 08-04-23 @ 01:05)  Source: .Blood Blood-Peripheral  Final Report (08-09-23 @ 09:01):    No growth at 5 days        Lactate, Blood: 2.8 mmol/L (08-12-23 @ 02:19)  Blood Gas Venous - Lactate: 2.90 mmol/L (08-12-23 @ 00:56)      INFECTIOUS DISEASES TESTING  Procalcitonin, Serum: 0.63 ng/mL (08-10-23 @ 08:46)  Procalcitonin, Serum: 7.82 ng/mL (08-04-23 @ 16:13)  MRSA PCR Result.: Negative (08-04-23 @ 14:52)  Rapid RVP Result: NotDetec (08-04-23 @ 03:00)      INFLAMMATORY MARKERS      RADIOLOGY & ADDITIONAL TESTS:  I have personally reviewed the last Chest xray  CXR  Xray Chest 1 View- PORTABLE-Urgent:   ACC: [id number] EXAM:  XR CHEST PORTABLE URGENT 1V   ORDERED BY: SEVERINO MARTINEZ     PROCEDURE DATE:  08/12/2023          INTERPRETATION:  CLINICAL HISTORY / REASON FOR EXAM: Shortness of breath.    COMPARISON: Chest radiograph from August 4, 2023.    TECHNIQUE/POSITIONING: Low lung volumes. Single image, AP portable chest   radiograph.    FINDINGS:    SUPPORT DEVICES: None.    CARDIAC/MEDIASTINUM/HILUM: Unchanged cardiac silhouette.    LUNG PARENCHYMA/PLEURA: Left basilar opacity. No pneumothorax.    SKELETON/SOFT TISSUES: Unchanged.      IMPRESSION:    Left basilar opacity.    --- End of Report ---            SOTO MACEDO MD; Attending Radiologist  This document has been electronically signed. Aug 12 2023 10:23AM (08-12-23 @ 01:32)      CT  CT Abdomen and Pelvis w/ IV Cont:   ACC: [id number] EXAM:  CT ABDOMEN AND PELVIS IC   ORDERED BY: SEVERINO MARTINEZ     PROCEDURE DATE:  08/12/2023          INTERPRETATION:  CLINICAL STATEMENT: Renal stone.    TECHNIQUE: Contiguous axial CT images were obtained from the lower chest   to thepubic symphysis following administration of 95 cc Omnipaque 350   intravenous contrast.  Oral contrast was not administered.  Reformatted   images in the coronal and sagittal planes were acquired. 5 cc contrast   discarded    Comparison made with CT abdomen and pelvis 8/4/2023.    FINDINGS:    LOWER CHEST: Increased right lower lobe, decreased left lower lobe   airspace opacities, compatible with pneumonia in the appropriate clinical   setting.    HEPATOBILIARY: Cholelithiasis. Liver unremarkable. No biliary dilation.    SPLEEN: Unremarkable.    PANCREAS: Unremarkable.    ADRENAL GLANDS: Unremarkable.    KIDNEYS: No hydronephrosis or renal calculus.    ABDOMINOPELVIC NODES: Unremarkable.    PELVIC ORGANS: Diffuse urinary bladder wall thickening, compatible with   urinary bladder infection. Uterus atrophic.    PERITONEUM/MESENTERY/BOWEL: Moderate hiatal hernia. No bowel obstruction.    BONES/SOFT TISSUES: Degenerative change, lumbar spine. Unchanged   anterolisthesis of L5 and S1.      IMPRESSION:  Since 8/4/2023:    Diffuse urinary bladder wall thickening, compatible with urinary bladder   cystitis, infection.    No hydronephrosis or renal calculus.    Increased right lower lobe, decreased left lower lobe airspace opacities,   compatible with pneumonia in the appropriate clinical setting.    --- End of Report ---            SHYANN GREENE MD; Attending Radiologist  This document has been electronically signed. Aug 12 2023  3:49AM (08-12-23 @ 04:12)      CARDIOLOGY TESTING  12 Lead ECG:   Ventricular Rate 114 BPM    Atrial Rate 114 BPM    P-R Interval 116 ms    QRS Duration 64 ms    Q-T Interval 314 ms    QTC Calculation(Bazett) 432 ms    P Axis 36 degrees    R Axis 66 degrees    T Axis 42 degrees    Diagnosis Line Sinus tachycardia  Otherwise normal ECG    Confirmed by Justice Stockton ([id number]) on 8/12/2023 10:23:41 AM (08-12-23 @ 01:08)  12 Lead ECG:   Ventricular Rate 105 BPM    Atrial Rate 105 BPM    P-R Interval 132 ms    QRS Duration 64 ms    Q-T Interval 330 ms    QTC Calculation(Bazett) 436 ms    P Axis 46 degrees    R Axis 36 degrees    T Axis 34 degrees    Diagnosis Line Sinus tachycardia  Otherwise normal ECG    Confirmed by MARJAN MENDES MD ([id number]) on 8/9/2023 7:34:13 AM (08-08-23 @ 22:06)      MEDICATIONS  enoxaparin Injectable 40 SubCutaneous every 24 hours  gabapentin 600 Oral daily  melatonin 5 Oral at bedtime  meropenem  IVPB 1000 IV Intermittent every 8 hours  multivitamin 1 Oral daily  norepinephrine Infusion 0.05 IV Continuous <Continuous>  pantoprazole  Injectable 40 IV Push daily  raloxifene 60 Oral daily  vancomycin  IVPB 1000 IV Intermittent every 12 hours        ANTIBIOTICS:  meropenem  IVPB 1000 milliGRAM(s) IV Intermittent every 8 hours  vancomycin  IVPB 1000 milliGRAM(s) IV Intermittent every 12 hours      ALLERGIES:  No Known Allergies

## 2023-08-12 NOTE — H&P ADULT - ATTENDING COMMENTS
58 year old Female with a PMHx of Down syndrome, nonverbal at baseline, hypothyroidism, cerebral palsy, congenital pulmonary stenosis brought in by EMS for SOB. Pt was discharged one day prior to admission for UTI/PNA sepsis w/ EBSL on meropenem.    #Septic shock, POA  #Acute Cystitis  #Multifocal PNA  #Acute Hypoxic Respiratory Failure  CT A/P in the ED showed Diffuse urinary bladder wall thickening, compatible with urinary bladder cystitis, infection. No hydronephrosis or renal calculus. Increased right lower lobe, decreased left lower lobe airspace opacities, compatible with pneumonia in the appropriate clinical setting.   Currently on levophed 0.1mcg  on Ventimask 50% saturating 100%  Ucx 08/04: ESBL E.Coli  - IV antibiotics: c/w meropenem and vancomycin  - ID consult  - f/u blood Cx, UCx, RVP  - Wean off O2 as tolerated, use HFNC if needed  - Wean off pressors as tolerated    #Down syndrome  #Congential P stenosis  TTE:  1. Suboptimal and very limited study.   2. Left ventricular ejection fraction, by visual estimation, is >55%.   3. Normal global left ventricular systolic function.   4. The left ventricular diastolic function could not be assessed in this   study.    #Osteoporosis  - Raloxifene     #Peripheral Neuropathy  - Gabapentin    #Hypothyroidism  - TSH - 1.63    - DVT Prophylaxis: Lovenox    #Progress Note Handoff  Pending (specify): Cont IV antibiotics, ID f/u, wean off o2, wean off pressors  Family discussion: dw aid by bedside regarding tx for sepsis  Disposition: [facility]

## 2023-08-12 NOTE — ED PROVIDER NOTE - CARE PLAN
1 Principal Discharge DX:	Sepsis  Secondary Diagnosis:	SOB (shortness of breath)  Secondary Diagnosis:	Wheezing

## 2023-08-12 NOTE — CONSULT NOTE ADULT - ASSESSMENT
Impression:     UTI - sepsis   Possible aspiration pneumonia/pneumonitis  Acute hypoxemic respiratory failure  Down syndrome     Plan:       CNS: Non verbal at baseline. Avoid depressants.     HEENT: Oral care.    PULMONARY:  HOB @ 45 degrees. Keep spo2 more than 92%. VBG noted. No hypercapnia. May use HFNC if needed. No distress on exam.     CARDIOVASCULAR: Keep equal balance. Avoid overload. Normal EF on recent echo. Not on pressors.     GI: GI prophylaxis.  Feeding: speech and swallow eval. NPO for now.    RENAL:  Follow up lytes.  Correct as needed.     INFECTIOUS DISEASE: Follow up cultures: blood, urine, nasal MRSA. ID eval. On Meropenem and Vancomycin for now. Vanc trough level before the 4th dose.     HEMATOLOGICAL:  DVT prophylaxis: Lovenox daily.     ENDOCRINE:  Follow up FS.  Insulin protocol if needed.    MUSCULOSKELETAL: Limited status at baseline.     No obvious indications for ICU or SDU at the moment. Observe on medical floor. Recall PRN.              Impression:     UTI - sepsis   Possible aspiration pneumonia/pneumonitis  Acute hypoxemic respiratory failure  Down syndrome     Plan:       CNS: Non verbal at baseline. Avoid depressants.     HEENT: Oral care.    PULMONARY:  HOB @ 45 degrees. Keep spo2 more than 92%. VBG noted. No hypercapnia. May use HFNC if needed. No distress on exam.     CARDIOVASCULAR: Keep equal balance. Avoid overload. Normal EF on recent echo. Not on pressors. LR at 75cc/hr.     GI: GI prophylaxis.  Feeding: speech and swallow eval. NPO for now.    RENAL:  Follow up lytes.  Correct as needed.     INFECTIOUS DISEASE: Follow up cultures: blood, urine, nasal MRSA. ID eval. On Meropenem and Vancomycin for now. Vanc trough level before the 4th dose.     HEMATOLOGICAL:  DVT prophylaxis: Lovenox daily.     ENDOCRINE:  Follow up FS.  Insulin protocol if needed.    MUSCULOSKELETAL: Limited status at baseline.     Patient will be started on pressors Admit to SDU.

## 2023-08-12 NOTE — ED PROVIDER NOTE - CLINICAL SUMMARY MEDICAL DECISION MAKING FREE TEXT BOX
Patient presented for evaluation of difficulty breathing.  Required EKG, cardiac, neck, labs with cultures, imaging.  Was found to have persistent pneumonia and UTI.  Patient started on antibiotics and given IV fluids.  ICU consulted and patient to be admitted stepdown for further evaluation and management.

## 2023-08-12 NOTE — ED PROVIDER NOTE - OBJECTIVE STATEMENT
57yo F w/a PMH of Down syndrome, nonverbal at baseline, hypothyroidism, cerebral palsy, congenital pulmonary stenosis brought in by EMS for SOB. pt was discharged yesterday for UTI/PNA sepsis w/ EBSL on meropenem. pt brought from NH for worsening SOB. per EMS pt was satting low to the 80s. on 6L NC pt up to 96%. rest of history taken from aide who states that pt arrived to NH from hospital sob and dry; pt became tachypnaeic and ems was called.

## 2023-08-12 NOTE — PATIENT PROFILE ADULT - FUNCTIONAL ASSESSMENT - BASIC MOBILITY 2.
11/16/18 [address] [place]   Patient Information   Mental Status Alert   Primary Caregiver Other (Comment)   Accompanied by/Relationship Sig  Other   Support System Immediate family   Activities of Daily Living Prior to Admission   Functional Status Moderate assistance   Assistive Device Walker   Living Arrangement House   Ambulation Moderate assistance   Means of Transportation   Means of Transport to Appts: Family   DASH discussion completed  Discussed goals of making sure pt's needs are met upon discharge, pt's preferences are taken into account, pt understands her health condition, medications and symptoms to watch for after returning home and pt is aware of any follow up appointments recommended by hospital physician  CM spoke with the pt at the bedside  Pt lives with his /sign  Other and she is his care giver  He has a cane , walker, WC, rollator and Shower Chair in the home  Pt has had [employer] in the past but is no longer active with them, he is not sure he will need them back  Pt also has [employer] at home that comes 1x a month through the Insurance to make sure he has what he needs to stay out of the hospital   Pt has O2 and neb through [employer] [place] and they have Life Alert through [employer]  Pt does not drive, his family commutes him to and fro  Pt has a [facility]HA that they pay privately that comes in for a few hours daily  Pt PCP is Dr Tigre Jessica so [facility] may be an option to assist with decrease in frequent admits    Pt used the [employer] in [place], [place]  1 = Total assistance

## 2023-08-12 NOTE — H&P ADULT - VTE RISK ASSESSMENT
[Home] : at home, [unfilled] , at the time of the visit. [Medical Office: ([facility])___] : at the medical office located in  [Verbal consent obtained from patient] : the patient, [unfilled] <<--- Click to launch

## 2023-08-12 NOTE — ED ADULT NURSE NOTE - OBJECTIVE STATEMENT
Pt presents to [facility] from Pt presents to ED bibems from [facility], care givers stated that she is having trouble breathing. Pt was discharged from hospital today, upon assessment pt has fever and is wheezing.

## 2023-08-12 NOTE — PATIENT PROFILE ADULT - FALL HARM RISK - HARM RISK INTERVENTIONS

## 2023-08-12 NOTE — H&P ADULT - ASSESSMENT
Ms. Linton is a 58 year old Female with a PMHx of Down syndrome, nonverbal at baseline, hypothyroidism, cerebral palsy, congenital pulmonary stenosis brought in by EMS for SOB. Pt was discharged yesterday for UTI/PNA sepsis w/ EBSL on meropenem. Pt brought from NH for worsening SOB. As per EMS pt was saturating low to the 80s. She was placed on on 6L NC and her saturation went up to 96%.     #Sepsis 2/2 Pneumonia  #Acute Cystitis  - CT A/P in the ED showed Diffuse urinary bladder wall thickening, compatible with urinary bladder cystitis, infection. No hydronephrosis or renal calculus. Increased right lower lobe, decreased left lower lobe airspace opacities, compatible with pneumonia in the appropriate clinical setting. Pt admitted to SDU for Sepsis 2/2 PNA.  - WBC 14, Lactate 2.6; febrile  - RVP if indicated  - Legionella/strep urine antigen   - Nebs prn sob/wheezing  - Incentive spirometry  - NC O2 prn sob, hypoxia with goal >92%  - Cbc, cmp, coags, mg, phos   - Sputum culture/induction  - IV antibiotics: c/w Vancomycin and Cefepime  - ID consult  - f/u blood and sputum c/x  - Urine Cx = >ESBL E. Coli  - c/w meropenem    #Down syndrome  #Congential P stenosis  TTE:  1. Suboptimal and very limited study.   2. Left ventricular ejection fraction, by visual estimation, is >55%.   3. Normal global left ventricular systolic function.   4. The left ventricular diastolic function could not be assessed in this   study.    #Osteoporosis  - Raloxifene     #Peripheral Neuropathy  - Gabapentin    #Hypothyroidism  - TSH - 1.63    #Misc  - DVT Prophylaxis: Lovenox  - GI Prophylaxis: Pantoprazole  - Diet: NPO pending Sp/Sw eval  - Code Status: full  
36.5

## 2023-08-12 NOTE — ED PROVIDER NOTE - PHYSICAL EXAMINATION
CONSTITUTIONAL: nontoxic appearing, in no acute distress, chronically ill appearings  HEAD:  normocephalic, atraumatic  EYES:  no conjunctival injection, no eye discharge, tracking well  ENT: dry mm   NECK:  supple, no masses, no tender anterior/posterior cervical lymphadenopathy  CV:  regular rate and rhythm, cap refill < 2 seconds  RESP:  poor air movement;   ABD:  soft, nontender, nondistended, no masses, no organomegaly  LYMPH:  no significant lymphadenopathy  MSK/NEURO:  normal movement, normal tone  SKIN:  warm, dry, no rash

## 2023-08-12 NOTE — H&P ADULT - HISTORY OF PRESENT ILLNESS
Ms. Linton is a 58 year old Female with a PMHx of Down syndrome, nonverbal at baseline, hypothyroidism, cerebral palsy, congenital pulmonary stenosis brought in by EMS for SOB. Pt was discharged yesterday for UTI/PNA sepsis w/ EBSL on meropenem. Pt brought from NH for worsening SOB. As per EMS pt was saturating low to the 80s. She was placed on on 6L NC and her saturation went up to 96%. History was obtained from aide.    Vital Signs Last 12 Hrs  T(F): 101.8 (08-12-23 @ 02:40), Max: 104.9 (08-12-23 @ 00:45)  HR: 85 (08-12-23 @ 02:40) (64 - 128)  BP: 107/52 (08-12-23 @ 02:40) (107/52 - 159/68)  RR: 18 (08-12-23 @ 02:40) (18 - 22)  SpO2: 95% (08-12-23 @ 02:40) (91% - 95%)    Labs in the ED were significant for WBC 14, Lactate 2.6, +UA    CT A/P in the ED showed Diffuse urinary bladder wall thickening, compatible with urinary bladder cystitis, infection. No hydronephrosis or renal calculus. Increased right lower lobe, decreased left lower lobe airspace opacities, compatible with pneumonia in the appropriate clinical setting. Pt admitted to SDU for Sepsis 2/2 PNA.

## 2023-08-12 NOTE — SWALLOW BEDSIDE ASSESSMENT ADULT - SLP PERTINENT HISTORY OF CURRENT PROBLEM
Ms. Linton is a 58 year old Female with a PMHx of Down syndrome, nonverbal at baseline, hypothyroidism, cerebral palsy, congenital pulmonary stenosis brought in by EMS for SOB. Pt was discharged yesterday for UTI/PNA sepsis w/ EBSL on meropenem. Pt brought from NH for worsening SOB. As per EMS pt was saturating low to the 80s. She was placed on on 6L NC and her saturation went up to 96%. CXR shows L basilar opacity.

## 2023-08-12 NOTE — CONSULT NOTE ADULT - SUBJECTIVE AND OBJECTIVE BOX
Patient is a 58y old  Female who presents with a chief complaint of     HPI:  Ms. Linton is a 58 year old Female with a PMHx of Down syndrome, nonverbal at baseline, hypothyroidism, cerebral palsy, congenital pulmonary stenosis brought in by EMS for SOB. Pt was discharged yesterday for UTI/PNA sepsis w/ EBSL on meropenem. Pt brought from NH for worsening SOB. As per EMS pt was saturating low to the 80s. She was placed on on 6L NC and her saturation went up to 96%. History was obtained from aide.    Vital Signs Last 12 Hrs  T(F): 101.8 (08-12-23 @ 02:40), Max: 104.9 (08-12-23 @ 00:45)  HR: 85 (08-12-23 @ 02:40) (64 - 128)  BP: 107/52 (08-12-23 @ 02:40) (107/52 - 159/68)  RR: 18 (08-12-23 @ 02:40) (18 - 22)  SpO2: 95% (08-12-23 @ 02:40) (91% - 95%)    Labs in the ED were significant for WBC 14, Lactate 2.6, +UA    CT A/P in the ED showed Diffuse urinary bladder wall thickening, compatible with urinary bladder cystitis, infection. No hydronephrosis or renal calculus. Increased right lower lobe, decreased left lower lobe airspace opacities, compatible with pneumonia in the appropriate clinical setting. Pt admitted to SDU for Sepsis 2/2 PNA.   (12 Aug 2023 05:49)      PAST MEDICAL & SURGICAL HISTORY:  Down syndrome      Osteoporosis      Mild anemia      Neuropathy      S/P debridement  of R hip on 3/2/21            FAMILY HISTORY:  No pertinent family history in first degree relatives    :  No known cardiovacular family hisotry     ROS:  See HPI     Allergies    No Known Allergies    Intolerances          PHYSICAL EXAM    ICU Vital Signs Last 24 Hrs  T(C): 38.8 (12 Aug 2023 02:40), Max: 40.5 (12 Aug 2023 00:45)  T(F): 101.8 (12 Aug 2023 02:40), Max: 104.9 (12 Aug 2023 00:45)  HR: 85 (12 Aug 2023 02:40) (64 - 128)  BP: 107/52 (12 Aug 2023 02:40) (107/52 - 159/68)  BP(mean): --  ABP: --  ABP(mean): --  RR: 18 (12 Aug 2023 02:40) (18 - 22)  SpO2: 95% (12 Aug 2023 02:40) (91% - 95%)    O2 Parameters below as of 12 Aug 2023 00:41  Patient On (Oxygen Delivery Method): mask, nonrebreather  O2 Flow (L/min): 10          General: In NAD; MS at baseline ?    HEENT:  NUVIA              Lymphatic system: No cervical LN   Lungs: Bilateral BS; coarse  Cardiovascular: Regular  Gastrointestinal: Soft, Positive BS; NT   Musculoskeletal: No clubbing.  Moves all extremities.  Full range of motion   Skin: Warm.  Intact  Neurological: At baseline.          LABS:                          9.9    14.12 )-----------( 454      ( 12 Aug 2023 01:03 )             31.6                                               08-12    137  |  102  |  19  ----------------------------<  193<H>  4.7   |  23  |  1.0    Ca    8.5      12 Aug 2023 01:03  Mg     2.2     08-10    TPro  6.5  /  Alb  3.3<L>  /  TBili  <0.2  /  DBili  x   /  AST  33  /  ALT  17  /  AlkPhos  88  08-12      PT/INR - ( 12 Aug 2023 01:03 )   PT: 11.60 sec;   INR: 1.02 ratio         PTT - ( 12 Aug 2023 01:03 )  PTT:25.8 sec                                       Urinalysis Basic - ( 12 Aug 2023 02:54 )    Color: Dark Yellow / Appearance: Turbid / SG: >1.030 / pH: x  Gluc: x / Ketone: Trace mg/dL  / Bili: Negative / Urobili: 1.0 mg/dL   Blood: x / Protein: 100 mg/dL / Nitrite: Negative   Leuk Esterase: Large / RBC: 3 /HPF / WBC >998 /HPF   Sq Epi: x / Non Sq Epi: 5 /HPF / Bacteria: Many /HPF                                                  LIVER FUNCTIONS - ( 12 Aug 2023 01:03 )  Alb: 3.3 g/dL / Pro: 6.5 g/dL / ALK PHOS: 88 U/L / ALT: 17 U/L / AST: 33 U/L / GGT: x                                                                                                                                       X-Rays mild right infiltrates                                                                                     ECHO    MEDICATIONS  (STANDING):  enoxaparin Injectable 40 milliGRAM(s) SubCutaneous every 24 hours  gabapentin 600 milliGRAM(s) Oral daily  melatonin 5 milliGRAM(s) Oral at bedtime  meropenem  IVPB 1000 milliGRAM(s) IV Intermittent every 8 hours  midodrine. 10 milliGRAM(s) Oral three times a day  multivitamin 1 Tablet(s) Oral daily  pantoprazole    Tablet 40 milliGRAM(s) Oral before breakfast  raloxifene 60 milliGRAM(s) Oral daily  vancomycin  IVPB 1000 milliGRAM(s) IV Intermittent every 12 hours    MEDICATIONS  (PRN):  acetaminophen     Tablet .. 650 milliGRAM(s) Oral every 6 hours PRN Temp greater or equal to 38C (100.4F), Mild Pain (1 - 3)

## 2023-08-12 NOTE — ED PROVIDER NOTE - ATTENDING CONTRIBUTION TO CARE
I personally evaluated the patient. I reviewed the Resident’s or Physician Assistant’s note (as assigned above), and agree with the findings and plan except as documented in my note.        58-year-old female with past medical history of Down syndrome, cerebral palsy, nonverbal, congenital pulmonary stenosis, hypothyroidism, was brought in for evaluation of difficulty breathing, hypoxia and fever.  She was admitted for about a week for management of pneumonia and UTI and was discharged yesterday.  As per nursing staff upon arrival to the nursing home patient had difficulty breathing prompting her to be sent back to the emergency room.  ROS is unable to be obtained due to patient well being nonverbal.  VS reviewed and patient is febrile, tachycardic and hypoxic, non toxic appearing, NAD, Head NCAT, MMM, neck supple, normal ROM, normal s1s2, lungs with bibasilar crackles, increased work of breathing, abd s/nt/nd, no guarding or rebound, extremities wnl, AAO x 3, GCS 15, neuro grossly normal. No acute skin lesions. Plan is evaluation for sepsis, IV antibiotics, IVF and admit.

## 2023-08-12 NOTE — CONSULT NOTE ADULT - ASSESSMENT
ASSESSMENT  58 year old Female with a PMHx of Down syndrome, nonverbal at baseline, hypothyroidism, cerebral palsy, congenital pulmonary stenosis brought in by EMS for SOB. Pt was discharged yesterday for UTI/PNA sepsis w/ EBSL on meropenem. Pt brought from NH for worsening SOB. As per EMS pt was saturating low to the 80s. She was placed on on 6L NC and her saturation went up to 96%. History was obtained from aide.  Recent prolonged hospital course- she was admitted with Severe sepsis on admission T>101 P>90 WBC 16--> 6 lactic acidosis, Shock requiring pressorsInitially had hypoxemic resp failure in the setting of coffee-ground emesis, CT with some opacities      8/4 UCX ESBL ecoli S macrobid  procalcitonin 7/92 8/4 BCX NGTD   By the time ID was consulted in the hospital course, the patient she clinically improved (Despite being on cefepime--> ceftriaxone), was Afebrile, normal WBC. She was given a few doses of meropenem and D/C on PO macrobid. Now readmitted for respiratory failure.     IMPRESSION  #Severe sepsis on admission T>101 P>90 WBC > 12 lactic acidosis  CTAP Since 8/4/2023:  Diffuse urinary bladder wall thickening, compatible with urinary bladder cystitis, infection.  No hydronephrosis or renal calculus.  Increased right lower lobe, decreased left lower lobe airspace opacities, compatible with pneumonia in the appropriate clinical setting.    UA Blood: x / Protein: 100 mg/dL / Nitrite: Negative   Leuk Esterase: Large / RBC: 3 /HPF / WBC >998 /HPF   Sq Epi: x / Non Sq Epi: 5 /HPF / Bacteria: Many /HPF    8/4 UCX   >100,000 CFU/ml Escherichia coli ESBL  Amoxicillin/Clavulanic Acid: S <=8/4 Nitrofurantoin: S <=32 trimethoprim/Sulfamethoxazole: R >2/38    Procalcitonin, Serum: 0.63 ng/mL (08-10-23 @ 08:46)    Procalcitonin, Serum: 7.82 ng/mL (08-04-23 @ 16:13)  #CXR Left basilar opacity  #Abx allergy: No Known Allergies    Creatinine: 1.0 (08-12-23 @ 01:03)    Height (cm): 142.2 (08-12-23 @ 00:41)  Weight (kg): 59 (08-12-23 @ 00:48)    RECOMMENDATIONS  This is an incomplete consult note. All final recommendations to follow after interview and examination of the patient. Please follow recommendations noted below.    If any questions, please send a message or call on [employer] Teams  Please continue to update ID with any pertinent new laboratory or radiographic findings     ASSESSMENT  58 year old Female with a PMHx of Down syndrome, nonverbal at baseline, hypothyroidism, cerebral palsy, congenital pulmonary stenosis brought in by EMS for SOB. Pt was discharged yesterday for UTI/PNA sepsis w/ EBSL on meropenem. Pt brought from NH for worsening SOB. As per EMS pt was saturating low to the 80s. She was placed on on 6L NC and her saturation went up to 96%. History was obtained from aide.  Recent prolonged hospital course- she was admitted with Severe sepsis on admission T>101 P>90 WBC 16--> 6 lactic acidosis, Shock requiring pressorsInitially had hypoxemic resp failure in the setting of coffee-ground emesis, CT with some opacities      8/4 UCX ESBL ecoli S macrobid  procalcitonin 7/92 8/4 BCX NGTD   By the time ID was consulted in the hospital course, the patient she clinically improved (Despite being on cefepime--> ceftriaxone), was Afebrile, normal WBC. She was given a few doses of meropenem and D/C on PO macrobid. Now readmitted for respiratory failure.     IMPRESSION  #Severe sepsis on admission T>101 P>90 WBC > 12 lactic acidosis  Acute cystitis, possible aspiration/GNR PNA  CTAP Since 8/4/2023:  Diffuse urinary bladder wall thickening, compatible with urinary bladder cystitis, infection.  No hydronephrosis or renal calculus.  Increased right lower lobe, decreased left lower lobe airspace opacities, compatible with pneumonia in the appropriate clinical setting.    UA Blood: x / Protein: 100 mg/dL / Nitrite: Negative   Leuk Esterase: Large / RBC: 3 /HPF / WBC >998 /HPF   Sq Epi: x / Non Sq Epi: 5 /HPF / Bacteria: Many /HPF    8/4 UCX   >100,000 CFU/ml Escherichia coli ESBL  Amoxicillin/Clavulanic Acid: S <=8/4 Nitrofurantoin: S <=32 trimethoprim/Sulfamethoxazole: R >2/38    Procalcitonin, Serum: 0.63 ng/mL (08-10-23 @ 08:46)    Procalcitonin, Serum: 7.82 ng/mL (08-04-23 @ 16:13)  #CXR Left basilar opacity  #Abx allergy: No Known Allergies    Creatinine: 1.0 (08-12-23 @ 01:03)    Height (cm): 142.2 (08-12-23 @ 00:41)  Weight (kg): 59 (08-12-23 @ 00:48)    RECOMMENDATIONS  - BCX, UCX  - meropenem  IVPB 1000 milliGRAM(s) IV Intermittent every 8 hours  - Check MRSA nares  - If BCX & MRSA nares unremarkable , D/C VANCO  - Pt is 59 kg please decrease Vanco to 500mg q12h IV   - Poor prognosis, GOC    If any questions, please send a message or call on [employer] Teams  Please continue to update ID with any pertinent new laboratory or radiographic findings

## 2023-08-13 LAB
ANION GAP SERPL CALC-SCNC: 12 MMOL/L — SIGNIFICANT CHANGE UP (ref 7–14)
BUN SERPL-MCNC: 14 MG/DL — SIGNIFICANT CHANGE UP (ref 10–20)
CALCIUM SERPL-MCNC: 8.5 MG/DL — SIGNIFICANT CHANGE UP (ref 8.4–10.5)
CHLORIDE SERPL-SCNC: 100 MMOL/L — SIGNIFICANT CHANGE UP (ref 98–110)
CO2 SERPL-SCNC: 28 MMOL/L — SIGNIFICANT CHANGE UP (ref 17–32)
CREAT SERPL-MCNC: 0.7 MG/DL — SIGNIFICANT CHANGE UP (ref 0.7–1.5)
CULTURE RESULTS: NO GROWTH — SIGNIFICANT CHANGE UP
EGFR: 101 ML/MIN/1.73M2 — SIGNIFICANT CHANGE UP
GLUCOSE SERPL-MCNC: 136 MG/DL — HIGH (ref 70–99)
HCT VFR BLD CALC: 27 % — LOW (ref 37–47)
HGB BLD-MCNC: 8.4 G/DL — LOW (ref 12–16)
MAGNESIUM SERPL-MCNC: 2.1 MG/DL — SIGNIFICANT CHANGE UP (ref 1.8–2.4)
MCHC RBC-ENTMCNC: 24.7 PG — LOW (ref 27–31)
MCHC RBC-ENTMCNC: 31.1 G/DL — LOW (ref 32–37)
MCV RBC AUTO: 79.4 FL — LOW (ref 81–99)
NRBC # BLD: 0 /100 WBCS — SIGNIFICANT CHANGE UP (ref 0–0)
PLATELET # BLD AUTO: 394 K/UL — SIGNIFICANT CHANGE UP (ref 130–400)
PMV BLD: 9.6 FL — SIGNIFICANT CHANGE UP (ref 7.4–10.4)
POTASSIUM SERPL-MCNC: 3.5 MMOL/L — SIGNIFICANT CHANGE UP (ref 3.5–5)
POTASSIUM SERPL-SCNC: 3.5 MMOL/L — SIGNIFICANT CHANGE UP (ref 3.5–5)
PROCALCITONIN SERPL-MCNC: 9.78 NG/ML — HIGH (ref 0.02–0.1)
RBC # BLD: 3.4 M/UL — LOW (ref 4.2–5.4)
RBC # FLD: 15.7 % — HIGH (ref 11.5–14.5)
SODIUM SERPL-SCNC: 140 MMOL/L — SIGNIFICANT CHANGE UP (ref 135–146)
SPECIMEN SOURCE: SIGNIFICANT CHANGE UP
WBC # BLD: 23.58 K/UL — HIGH (ref 4.8–10.8)
WBC # FLD AUTO: 23.58 K/UL — HIGH (ref 4.8–10.8)

## 2023-08-13 PROCEDURE — 99233 SBSQ HOSP IP/OBS HIGH 50: CPT

## 2023-08-13 RX ORDER — MIDODRINE HYDROCHLORIDE 2.5 MG/1
5 TABLET ORAL EVERY 8 HOURS
Refills: 0 | Status: DISCONTINUED | OUTPATIENT
Start: 2023-08-13 | End: 2023-08-18

## 2023-08-13 RX ORDER — CHLORHEXIDINE GLUCONATE 213 G/1000ML
1 SOLUTION TOPICAL
Refills: 0 | Status: DISCONTINUED | OUTPATIENT
Start: 2023-08-13 | End: 2023-08-18

## 2023-08-13 RX ORDER — SODIUM CHLORIDE 9 MG/ML
1000 INJECTION, SOLUTION INTRAVENOUS
Refills: 0 | Status: DISCONTINUED | OUTPATIENT
Start: 2023-08-13 | End: 2023-08-14

## 2023-08-13 RX ADMIN — MIDODRINE HYDROCHLORIDE 5 MILLIGRAM(S): 2.5 TABLET ORAL at 22:01

## 2023-08-13 RX ADMIN — ENOXAPARIN SODIUM 40 MILLIGRAM(S): 100 INJECTION SUBCUTANEOUS at 12:55

## 2023-08-13 RX ADMIN — Medication 100 MILLIGRAM(S): at 06:30

## 2023-08-13 RX ADMIN — MEROPENEM 100 MILLIGRAM(S): 1 INJECTION INTRAVENOUS at 06:29

## 2023-08-13 RX ADMIN — MEROPENEM 100 MILLIGRAM(S): 1 INJECTION INTRAVENOUS at 22:01

## 2023-08-13 RX ADMIN — PANTOPRAZOLE SODIUM 40 MILLIGRAM(S): 20 TABLET, DELAYED RELEASE ORAL at 12:56

## 2023-08-13 RX ADMIN — GABAPENTIN 600 MILLIGRAM(S): 400 CAPSULE ORAL at 12:55

## 2023-08-13 RX ADMIN — Medication 1 TABLET(S): at 12:55

## 2023-08-13 RX ADMIN — MEROPENEM 100 MILLIGRAM(S): 1 INJECTION INTRAVENOUS at 14:51

## 2023-08-13 RX ADMIN — CHLORHEXIDINE GLUCONATE 1 APPLICATION(S): 213 SOLUTION TOPICAL at 18:51

## 2023-08-13 RX ADMIN — Medication 5 MILLIGRAM(S): at 22:01

## 2023-08-13 RX ADMIN — RALOXIFENE HYDROCHLORIDE 60 MILLIGRAM(S): 60 TABLET, COATED ORAL at 12:55

## 2023-08-13 NOTE — PROGRESS NOTE ADULT - ASSESSMENT
Impression:     UTI - sepsis   Possible aspiration pneumonia/pneumonitis  Acute hypoxemic respiratory failure  Down syndrome     Plan:       CNS: Non verbal at baseline. Avoid depressants.     HEENT: Oral care.    PULMONARY:  HOB @ 45 degrees. Keep spo2 more than 92%. VBG noted. No hypercapnia.     CARDIOVASCULAR: Keep equal balance. Avoid overload. Normal EF on recent echo.  LR at 75cc/hr. Midodrine 5mg TID. Wean off Levophed.     GI: GI prophylaxis.  Feeding: speech and swallow eval. NPO for now.    RENAL:  Follow up lytes.  Correct as needed.     INFECTIOUS DISEASE: Follow up cultures: blood, urine, nasal MRSA. ID eval. On Meropenem IV per ID.    HEMATOLOGICAL:  DVT prophylaxis: Lovenox daily. Keep hgb more than 7.     ENDOCRINE:  Follow up FS.  Insulin protocol if needed.    MUSCULOSKELETAL: Limited status at baseline.     If stable off pressors then can transfer to floor.

## 2023-08-13 NOTE — PROGRESS NOTE ADULT - SUBJECTIVE AND OBJECTIVE BOX
TEA ECHAVARRIA  58y Female    CHIEF COMPLAINT:    Patient is a 58y old  Female who presents with a chief complaint of SOB     INTERVAL HPI/OVERNIGHT EVENTS:    Patient seen and examined. No acute events overnight. Remains on low dose levophed     ROS: All other systems are negative.    Vital Signs:    T(F): 97 (23 @ 11:00), Max: 99.5 (23 @ 17:00)  HR: 86 (23 @ 11:00) (63 - 110)  BP: 137/58 (23 @ 11:00) (83/53 - 140/63)  RR: 18 (23 @ 11:00) (18 - 22)  SpO2: 95% (23 @ 11:00) (94% - 99%)    12 Aug 2023 07:01  -  13 Aug 2023 07:00  --------------------------------------------------------  IN: 616.5 mL / OUT: 1900 mL / NET: -1283.5 mL    Daily Weight in k.3 (13 Aug 2023 06:00)    PHYSICAL EXAM:    GENERAL:  NAD chronically ill appearing   SKIN: No rashes or lesions  HEENT: Atraumatic. Normocephalic.    NECK: Supple, No JVD.    PULMONARY: decreased breath sounds B/L. No wheezing.    CVS: Normal S1, S2. Rate and Rhythm are regular   ABDOMEN/GI: Soft, Nontender, Nondistended   MSK:  No clubbing or cyanosis   NEUROLOGIC: does not follow commands   PSYCH: lethargic, nonverbal     Consultant(s) Notes Reviewed:  [x ] YES  [ ] NO  Care Discussed with Consultants/Other Providers [ x] YES  [ ] NO    LABS:                        8.4    23.58 )-----------( 394      ( 13 Aug 2023 06:14 )             27.0     140  |  100  |  14  ----------------------------<  136<H>  3.5   |  28  |  0.7    Ca    8.5      13 Aug 2023 06:14  Mg     2.1     08-13    TPro  6.4  /  Alb  3.4<L>  /  TBili  0.3  /  DBili  x   /  AST  27  /  ALT  19  /  AlkPhos  85  08-12    PT/INR - ( 12 Aug 2023 01:03 )   PT: 11.60 sec;   INR: 1.02 ratio      PTT - ( 12 Aug 2023 01:03 )  PTT:25.8 sec    Culture - Urine (collected 12 Aug 2023 02:54)  Source: Clean Catch Clean Catch (Midstream)  Final Report (13 Aug 2023 06:44):    No growth    Culture - Blood (collected 12 Aug 2023 01:03)  Source: .Blood Blood-Peripheral  Preliminary Report (13 Aug 2023 09:02):    No growth at 24 hours    Culture - Blood (collected 12 Aug 2023 01:03)  Source: .Blood Blood-Peripheral  Preliminary Report (13 Aug 2023 09:02):    No growth at 24 hours    RADIOLOGY & ADDITIONAL TESTS:  Imaging or report Personally Reviewed:  [x] YES  [ ] NO  EKG reviewed: [x] YES  [ ] NO    Medications:  Standing  enoxaparin Injectable 40 milliGRAM(s) SubCutaneous every 24 hours  gabapentin 600 milliGRAM(s) Oral daily  lactated ringers. 1000 milliLiter(s) IV Continuous <Continuous>  melatonin 5 milliGRAM(s) Oral at bedtime  meropenem  IVPB 1000 milliGRAM(s) IV Intermittent every 8 hours  multivitamin 1 Tablet(s) Oral daily  norepinephrine Infusion 0.05 MICROgram(s)/kG/Min IV Continuous <Continuous>  pantoprazole  Injectable 40 milliGRAM(s) IV Push daily  raloxifene 60 milliGRAM(s) Oral daily  vancomycin  IVPB 500 milliGRAM(s) IV Intermittent every 12 hours    PRN Meds  acetaminophen     Tablet .. 650 milliGRAM(s) Oral every 6 hours PRN

## 2023-08-13 NOTE — PHARMACOTHERAPY INTERVENTION NOTE - COMMENTS
Consistent with ID note, recommended to discontinue vancomycin order since the recent nasal MRSA PCR was negative.    Tiffany Parikh, PharmD, BCIDP  Clinical Pharmacy Specialist, Infectious Diseases  Tele-Antimicrobial Stewardship Program (Tele-ASP)  Tele-ASP Phone: [phone]

## 2023-08-13 NOTE — PROGRESS NOTE ADULT - SUBJECTIVE AND OBJECTIVE BOX
JERICHO TEA  58y, Female  Allergy: No Known Allergies      LOS  1d    CHIEF COMPLAINT:     INTERVAL EVENTS/HPI  - No acute events overnight, Afebrile, Blood & Urine cxs NGTD   - T(F): , Max: 99.5 (08-12-23 @ 17:00)  - Tolerating medication  - WBC Count: 23.58 (08-13-23 @ 06:14)  WBC Count: 27.08 (08-12-23 @ 21:05)     - Creatinine: 0.7 (08-13-23 @ 06:14)  Creatinine: 0.9 (08-12-23 @ 11:25)       ROS  unable to obtain history secondary to patient's mental status and/or sedation     VITALS:  T(F): 97.3, Max: 99.5 (08-12-23 @ 17:00)  HR: 63  BP: 112/56  RR: 20Vital Signs Last 24 Hrs  T(C): 36.3 (13 Aug 2023 07:50), Max: 37.5 (12 Aug 2023 17:00)  T(F): 97.3 (13 Aug 2023 07:50), Max: 99.5 (12 Aug 2023 17:00)  HR: 63 (13 Aug 2023 07:50) (62 - 110)  BP: 112/56 (13 Aug 2023 07:50) (83/53 - 140/63)  BP(mean): 81 (13 Aug 2023 07:50) (58 - 91)  RR: 20 (13 Aug 2023 06:00) (15 - 22)  SpO2: 94% (13 Aug 2023 06:00) (94% - 99%)    Parameters below as of 12 Aug 2023 23:08  Patient On (Oxygen Delivery Method): nasal cannula        PHYSICAL EXAM:  ***    FH: Non-contributory  Social Hx: Non-contributory    TESTS & MEASUREMENTS:                        8.4    23.58 )-----------( 394      ( 13 Aug 2023 06:14 )             27.0     08-13    140  |  100  |  14  ----------------------------<  136<H>  3.5   |  28  |  0.7    Ca    8.5      13 Aug 2023 06:14  Mg     2.1     08-13    TPro  6.4  /  Alb  3.4<L>  /  TBili  0.3  /  DBili  x   /  AST  27  /  ALT  19  /  AlkPhos  85  08-12      LIVER FUNCTIONS - ( 12 Aug 2023 11:25 )  Alb: 3.4 g/dL / Pro: 6.4 g/dL / ALK PHOS: 85 U/L / ALT: 19 U/L / AST: 27 U/L / GGT: x           Urinalysis Basic - ( 13 Aug 2023 06:14 )    Color: x / Appearance: x / SG: x / pH: x  Gluc: 136 mg/dL / Ketone: x  / Bili: x / Urobili: x   Blood: x / Protein: x / Nitrite: x   Leuk Esterase: x / RBC: x / WBC x   Sq Epi: x / Non Sq Epi: x / Bacteria: x        Culture - Urine (collected 08-12-23 @ 02:54)  Source: Clean Catch Clean Catch (Midstream)  Final Report (08-13-23 @ 06:44):    No growth    Culture - Blood (collected 08-12-23 @ 01:03)  Source: .Blood Blood-Peripheral  Preliminary Report (08-13-23 @ 09:02):    No growth at 24 hours    Culture - Blood (collected 08-12-23 @ 01:03)  Source: .Blood Blood-Peripheral  Preliminary Report (08-13-23 @ 09:02):    No growth at 24 hours    Culture - Urine (collected 08-04-23 @ 02:08)  Source: Clean Catch Clean Catch (Midstream)  Final Report (08-07-23 @ 18:10):    >100,000 CFU/ml Escherichia coli ESBL  Organism: Escherichia coli ESBL (08-07-23 @ 18:10)  Organism: Escherichia coli ESBL (08-07-23 @ 18:10)      Method Type: ZANE      -  Amikacin: S <=16      -  Amoxicillin/Clavulanic Acid: S <=8/4      -  Ampicillin: R >16 These ampicillin results predict results for amoxicillin      -  Ampicillin/Sulbactam: S <=4/2 Enterobacter, Klebsiella aerogenes, Citrobacter, and Serratia may develop resistance during prolonged therapy (3-4 days)      -  Aztreonam: R >16      -  Cefazolin: R >16 For uncomplicated UTI with K. pneumoniae, E. coli, or P. mirablis: ZANE <=16 is sensitive and ZANE >=32 is resistant. This also predicts results for oral agents cefaclor, cefdinir, cefpodoxime, cefprozil, cefuroxime axetil, cephalexin and locarbef for uncomplicated UTI. Note that some isolates may be susceptible to these agents while testing resistant to cefazolin.      -  Cefepime: R 4      -  Ceftriaxone: R >32 Enterobacter, Klebsiella aerogenes, Citrobacter, and Serratia may develop resistance during prolonged therapy      -  Cefuroxime: R >16      -  Ciprofloxacin: R >2      -  Ertapenem: S <=0.5      -  Gentamicin: S <=2      -  Imipenem: S <=1      -  Levofloxacin: R >4      -  Meropenem: S <=1      -  Nitrofurantoin: S <=32 Should not be used to treat pyelonephritis      -  Piperacillin/Tazobactam: S <=8      -  Tobramycin: S <=2      -  Trimethoprim/Sulfamethoxazole: R >2/38    Culture - Blood (collected 08-04-23 @ 01:05)  Source: .Blood Blood-Peripheral  Final Report (08-09-23 @ 09:01):    No growth at 5 days    Culture - Blood (collected 08-04-23 @ 01:05)  Source: .Blood Blood-Peripheral  Final Report (08-09-23 @ 09:01):    No growth at 5 days        Lactate, Blood: 1.4 mmol/L (08-12-23 @ 21:05)  Lactate, Blood: 4.4 mmol/L (08-12-23 @ 11:25)  Lactate, Blood: 2.8 mmol/L (08-12-23 @ 02:19)  Blood Gas Venous - Lactate: 2.90 mmol/L (08-12-23 @ 00:56)      INFECTIOUS DISEASES TESTING  Procalcitonin, Serum: 9.78 (08-12-23 @ 11:25)  Rapid RVP Result: NotDetec (08-12-23 @ 01:33)  Procalcitonin, Serum: 0.63 (08-10-23 @ 08:46)  Procalcitonin, Serum: 7.82 (08-04-23 @ 16:13)  MRSA PCR Result.: Negative (08-04-23 @ 14:52)  Rapid RVP Result: NotDetec (08-04-23 @ 03:00)  strept    INFLAMMATORY MARKERS      RADIOLOGY & ADDITIONAL TESTS:  I have personally reviewed the last available Chest xray  CXR  Xray Chest 1 View- PORTABLE-Urgent:   ACC: [id number] EXAM:  XR CHEST PORTABLE URGENT 1V   ORDERED BY: SEVERINO MARTINEZ     PROCEDURE DATE:  08/12/2023          INTERPRETATION:  CLINICAL HISTORY / REASON FOR EXAM: Shortness of breath.    COMPARISON: Chest radiograph from August 4, 2023.    TECHNIQUE/POSITIONING: Low lung volumes. Single image, AP portable chest   radiograph.    FINDINGS:    SUPPORT DEVICES: None.    CARDIAC/MEDIASTINUM/HILUM: Unchanged cardiac silhouette.    LUNG PARENCHYMA/PLEURA: Left basilar opacity. No pneumothorax.    SKELETON/SOFT TISSUES: Unchanged.      IMPRESSION:    Left basilar opacity.    --- End of Report ---            SOTO MACEDO MD; Attending Radiologist  This document has been electronically signed. Aug 12 2023 10:23AM (08-12-23 @ 01:32)      CT  CT Abdomen and Pelvis w/ IV Cont:   ACC: [id number] EXAM:  CT ABDOMEN AND PELVIS IC   ORDERED BY: SEVERINO MARTINEZ     PROCEDURE DATE:  08/12/2023          INTERPRETATION:  CLINICAL STATEMENT: Renal stone.    TECHNIQUE: Contiguous axial CT images were obtained from the lower chest   to thepubic symphysis following administration of 95 cc Omnipaque 350   intravenous contrast.  Oral contrast was not administered.  Reformatted   images in the coronal and sagittal planes were acquired. 5 cc contrast   discarded    Comparison made with CT abdomen and pelvis 8/4/2023.    FINDINGS:    LOWER CHEST: Increased right lower lobe, decreased left lower lobe   airspace opacities, compatible with pneumonia in the appropriate clinical   setting.    HEPATOBILIARY: Cholelithiasis. Liver unremarkable. No biliary dilation.    SPLEEN: Unremarkable.    PANCREAS: Unremarkable.    ADRENAL GLANDS: Unremarkable.    KIDNEYS: No hydronephrosis or renal calculus.    ABDOMINOPELVIC NODES: Unremarkable.    PELVIC ORGANS: Diffuse urinary bladder wall thickening, compatible with   urinary bladder infection. Uterus atrophic.    PERITONEUM/MESENTERY/BOWEL: Moderate hiatal hernia. No bowel obstruction.    BONES/SOFT TISSUES: Degenerative change, lumbar spine. Unchanged   anterolisthesis of L5 and S1.      IMPRESSION:  Since 8/4/2023:    Diffuse urinary bladder wall thickening, compatible with urinary bladder   cystitis, infection.    No hydronephrosis or renal calculus.    Increased right lower lobe, decreased left lower lobe airspace opacities,   compatible with pneumonia in the appropriate clinical setting.    --- End of Report ---            SHYANN GREENE MD; Attending Radiologist  This document has been electronically signed. Aug 12 2023  3:49AM (08-12-23 @ 04:12)      CARDIOLOGY TESTING  12 Lead ECG:   Ventricular Rate 114 BPM    Atrial Rate 114 BPM    P-R Interval 116 ms    QRS Duration 64 ms    Q-T Interval 314 ms    QTC Calculation(Bazett) 432 ms    P Axis 36 degrees    R Axis 66 degrees    T Axis 42 degrees    Diagnosis Line Sinus tachycardia  Otherwise normal ECG    Confirmed by Justice Stockton ([id number]) on 8/12/2023 10:23:41 AM (08-12-23 @ 01:08)  12 Lead ECG:   Ventricular Rate 105 BPM    Atrial Rate 105 BPM    P-R Interval 132 ms    QRS Duration 64 ms    Q-T Interval 330 ms    QTC Calculation(Bazett) 436 ms    P Axis 46 degrees    R Axis 36 degrees    T Axis 34 degrees    Diagnosis Line Sinus tachycardia  Otherwise normal ECG    Confirmed by MARJAN MENDES MD ([id number]) on 8/9/2023 7:34:13 AM (08-08-23 @ 22:06)      MEDICATIONS  enoxaparin Injectable 40 SubCutaneous every 24 hours  gabapentin 600 Oral daily  lactated ringers. 1000 IV Continuous <Continuous>  melatonin 5 Oral at bedtime  meropenem  IVPB 1000 IV Intermittent every 8 hours  multivitamin 1 Oral daily  norepinephrine Infusion 0.05 IV Continuous <Continuous>  pantoprazole  Injectable 40 IV Push daily  raloxifene 60 Oral daily  vancomycin  IVPB 500 IV Intermittent every 12 hours      WEIGHT  Weight (kg): 52.3 (08-13-23 @ 05:16)  Creatinine: 0.7 mg/dL (08-13-23 @ 06:14)  Creatinine: 0.9 mg/dL (08-12-23 @ 11:25)      ANTIBIOTICS:  meropenem  IVPB 1000 milliGRAM(s) IV Intermittent every 8 hours  vancomycin  IVPB 500 milliGRAM(s) IV Intermittent every 12 hours      All available historical records have been reviewed       JERICHO TEA  58y, Female  Allergy: No Known Allergies      LOS  1d    CHIEF COMPLAINT:     INTERVAL EVENTS/HPI  - No acute events overnight, Afebrile, Blood & Urine cxs NGTD   - T(F): , Max: 99.5 (08-12-23 @ 17:00)  - Tolerating medication  - WBC Count: 23.58 (08-13-23 @ 06:14)  WBC Count: 27.08 (08-12-23 @ 21:05)     - Creatinine: 0.7 (08-13-23 @ 06:14)  Creatinine: 0.9 (08-12-23 @ 11:25)       ROS  unable to obtain history secondary to patient's mental status and/or sedation     VITALS:  T(F): 97.3, Max: 99.5 (08-12-23 @ 17:00)  HR: 63  BP: 112/56  RR: 20Vital Signs Last 24 Hrs  T(C): 36.3 (13 Aug 2023 07:50), Max: 37.5 (12 Aug 2023 17:00)  T(F): 97.3 (13 Aug 2023 07:50), Max: 99.5 (12 Aug 2023 17:00)  HR: 63 (13 Aug 2023 07:50) (62 - 110)  BP: 112/56 (13 Aug 2023 07:50) (83/53 - 140/63)  BP(mean): 81 (13 Aug 2023 07:50) (58 - 91)  RR: 20 (13 Aug 2023 06:00) (15 - 22)  SpO2: 94% (13 Aug 2023 06:00) (94% - 99%)    Parameters below as of 12 Aug 2023 23:08  Patient On (Oxygen Delivery Method): nasal cannula        PHYSICAL EXAM:  Gen: chronically ill appearing   HEENT: Normocephalic, atraumatic  Neck: supple, no lymphadenopathy  CV: Regular rate & regular rhythm  Lungs: decreased BS at bases, no fremitus  Abdomen: Soft, BS present  Ext: Warm, well perfused  Neuro: non focal, not following commands  Skin: no rash, no erythema  Lines: no phlebitis     FH: Non-contributory  Social Hx: Non-contributory    TESTS & MEASUREMENTS:                        8.4    23.58 )-----------( 394      ( 13 Aug 2023 06:14 )             27.0     08-13    140  |  100  |  14  ----------------------------<  136<H>  3.5   |  28  |  0.7    Ca    8.5      13 Aug 2023 06:14  Mg     2.1     08-13    TPro  6.4  /  Alb  3.4<L>  /  TBili  0.3  /  DBili  x   /  AST  27  /  ALT  19  /  AlkPhos  85  08-12      LIVER FUNCTIONS - ( 12 Aug 2023 11:25 )  Alb: 3.4 g/dL / Pro: 6.4 g/dL / ALK PHOS: 85 U/L / ALT: 19 U/L / AST: 27 U/L / GGT: x           Urinalysis Basic - ( 13 Aug 2023 06:14 )    Color: x / Appearance: x / SG: x / pH: x  Gluc: 136 mg/dL / Ketone: x  / Bili: x / Urobili: x   Blood: x / Protein: x / Nitrite: x   Leuk Esterase: x / RBC: x / WBC x   Sq Epi: x / Non Sq Epi: x / Bacteria: x        Culture - Urine (collected 08-12-23 @ 02:54)  Source: Clean Catch Clean Catch (Midstream)  Final Report (08-13-23 @ 06:44):    No growth    Culture - Blood (collected 08-12-23 @ 01:03)  Source: .Blood Blood-Peripheral  Preliminary Report (08-13-23 @ 09:02):    No growth at 24 hours    Culture - Blood (collected 08-12-23 @ 01:03)  Source: .Blood Blood-Peripheral  Preliminary Report (08-13-23 @ 09:02):    No growth at 24 hours    Culture - Urine (collected 08-04-23 @ 02:08)  Source: Clean Catch Clean Catch (Midstream)  Final Report (08-07-23 @ 18:10):    >100,000 CFU/ml Escherichia coli ESBL  Organism: Escherichia coli ESBL (08-07-23 @ 18:10)  Organism: Escherichia coli ESBL (08-07-23 @ 18:10)      Method Type: ZANE      -  Amikacin: S <=16      -  Amoxicillin/Clavulanic Acid: S <=8/4      -  Ampicillin: R >16 These ampicillin results predict results for amoxicillin      -  Ampicillin/Sulbactam: S <=4/2 Enterobacter, Klebsiella aerogenes, Citrobacter, and Serratia may develop resistance during prolonged therapy (3-4 days)      -  Aztreonam: R >16      -  Cefazolin: R >16 For uncomplicated UTI with K. pneumoniae, E. coli, or P. mirablis: ZANE <=16 is sensitive and ZANE >=32 is resistant. This also predicts results for oral agents cefaclor, cefdinir, cefpodoxime, cefprozil, cefuroxime axetil, cephalexin and locarbef for uncomplicated UTI. Note that some isolates may be susceptible to these agents while testing resistant to cefazolin.      -  Cefepime: R 4      -  Ceftriaxone: R >32 Enterobacter, Klebsiella aerogenes, Citrobacter, and Serratia may develop resistance during prolonged therapy      -  Cefuroxime: R >16      -  Ciprofloxacin: R >2      -  Ertapenem: S <=0.5      -  Gentamicin: S <=2      -  Imipenem: S <=1      -  Levofloxacin: R >4      -  Meropenem: S <=1      -  Nitrofurantoin: S <=32 Should not be used to treat pyelonephritis      -  Piperacillin/Tazobactam: S <=8      -  Tobramycin: S <=2      -  Trimethoprim/Sulfamethoxazole: R >2/38    Culture - Blood (collected 08-04-23 @ 01:05)  Source: .Blood Blood-Peripheral  Final Report (08-09-23 @ 09:01):    No growth at 5 days    Culture - Blood (collected 08-04-23 @ 01:05)  Source: .Blood Blood-Peripheral  Final Report (08-09-23 @ 09:01):    No growth at 5 days        Lactate, Blood: 1.4 mmol/L (08-12-23 @ 21:05)  Lactate, Blood: 4.4 mmol/L (08-12-23 @ 11:25)  Lactate, Blood: 2.8 mmol/L (08-12-23 @ 02:19)  Blood Gas Venous - Lactate: 2.90 mmol/L (08-12-23 @ 00:56)      INFECTIOUS DISEASES TESTING  Procalcitonin, Serum: 9.78 (08-12-23 @ 11:25)  Rapid RVP Result: NotDetec (08-12-23 @ 01:33)  Procalcitonin, Serum: 0.63 (08-10-23 @ 08:46)  Procalcitonin, Serum: 7.82 (08-04-23 @ 16:13)  MRSA PCR Result.: Negative (08-04-23 @ 14:52)  Rapid RVP Result: NotDetec (08-04-23 @ 03:00)  strept    INFLAMMATORY MARKERS      RADIOLOGY & ADDITIONAL TESTS:  I have personally reviewed the last available Chest xray  CXR  Xray Chest 1 View- PORTABLE-Urgent:   ACC: [id number] EXAM:  XR CHEST PORTABLE URGENT 1V   ORDERED BY: SEVERINO MARTINEZ     PROCEDURE DATE:  08/12/2023          INTERPRETATION:  CLINICAL HISTORY / REASON FOR EXAM: Shortness of breath.    COMPARISON: Chest radiograph from August 4, 2023.    TECHNIQUE/POSITIONING: Low lung volumes. Single image, AP portable chest   radiograph.    FINDINGS:    SUPPORT DEVICES: None.    CARDIAC/MEDIASTINUM/HILUM: Unchanged cardiac silhouette.    LUNG PARENCHYMA/PLEURA: Left basilar opacity. No pneumothorax.    SKELETON/SOFT TISSUES: Unchanged.      IMPRESSION:    Left basilar opacity.    --- End of Report ---            SOTO MACEDO MD; Attending Radiologist  This document has been electronically signed. Aug 12 2023 10:23AM (08-12-23 @ 01:32)      CT  CT Abdomen and Pelvis w/ IV Cont:   ACC: [id number] EXAM:  CT ABDOMEN AND PELVIS IC   ORDERED BY: SEVERINO MARTINEZ     PROCEDURE DATE:  08/12/2023          INTERPRETATION:  CLINICAL STATEMENT: Renal stone.    TECHNIQUE: Contiguous axial CT images were obtained from the lower chest   to thepubic symphysis following administration of 95 cc Omnipaque 350   intravenous contrast.  Oral contrast was not administered.  Reformatted   images in the coronal and sagittal planes were acquired. 5 cc contrast   discarded    Comparison made with CT abdomen and pelvis 8/4/2023.    FINDINGS:    LOWER CHEST: Increased right lower lobe, decreased left lower lobe   airspace opacities, compatible with pneumonia in the appropriate clinical   setting.    HEPATOBILIARY: Cholelithiasis. Liver unremarkable. No biliary dilation.    SPLEEN: Unremarkable.    PANCREAS: Unremarkable.    ADRENAL GLANDS: Unremarkable.    KIDNEYS: No hydronephrosis or renal calculus.    ABDOMINOPELVIC NODES: Unremarkable.    PELVIC ORGANS: Diffuse urinary bladder wall thickening, compatible with   urinary bladder infection. Uterus atrophic.    PERITONEUM/MESENTERY/BOWEL: Moderate hiatal hernia. No bowel obstruction.    BONES/SOFT TISSUES: Degenerative change, lumbar spine. Unchanged   anterolisthesis of L5 and S1.      IMPRESSION:  Since 8/4/2023:    Diffuse urinary bladder wall thickening, compatible with urinary bladder   cystitis, infection.    No hydronephrosis or renal calculus.    Increased right lower lobe, decreased left lower lobe airspace opacities,   compatible with pneumonia in the appropriate clinical setting.    --- End of Report ---            SHYANN GREENE MD; Attending Radiologist  This document has been electronically signed. Aug 12 2023  3:49AM (08-12-23 @ 04:12)      CARDIOLOGY TESTING  12 Lead ECG:   Ventricular Rate 114 BPM    Atrial Rate 114 BPM    P-R Interval 116 ms    QRS Duration 64 ms    Q-T Interval 314 ms    QTC Calculation(Bazett) 432 ms    P Axis 36 degrees    R Axis 66 degrees    T Axis 42 degrees    Diagnosis Line Sinus tachycardia  Otherwise normal ECG    Confirmed by Justice Stockton ([id number]) on 8/12/2023 10:23:41 AM (08-12-23 @ 01:08)  12 Lead ECG:   Ventricular Rate 105 BPM    Atrial Rate 105 BPM    P-R Interval 132 ms    QRS Duration 64 ms    Q-T Interval 330 ms    QTC Calculation(Bazett) 436 ms    P Axis 46 degrees    R Axis 36 degrees    T Axis 34 degrees    Diagnosis Line Sinus tachycardia  Otherwise normal ECG    Confirmed by MARJAN MENDES MD ([id number]) on 8/9/2023 7:34:13 AM (08-08-23 @ 22:06)      MEDICATIONS  enoxaparin Injectable 40 SubCutaneous every 24 hours  gabapentin 600 Oral daily  lactated ringers. 1000 IV Continuous <Continuous>  melatonin 5 Oral at bedtime  meropenem  IVPB 1000 IV Intermittent every 8 hours  multivitamin 1 Oral daily  norepinephrine Infusion 0.05 IV Continuous <Continuous>  pantoprazole  Injectable 40 IV Push daily  raloxifene 60 Oral daily  vancomycin  IVPB 500 IV Intermittent every 12 hours      WEIGHT  Weight (kg): 52.3 (08-13-23 @ 05:16)  Creatinine: 0.7 mg/dL (08-13-23 @ 06:14)  Creatinine: 0.9 mg/dL (08-12-23 @ 11:25)      ANTIBIOTICS:  meropenem  IVPB 1000 milliGRAM(s) IV Intermittent every 8 hours  vancomycin  IVPB 500 milliGRAM(s) IV Intermittent every 12 hours      All available historical records have been reviewed

## 2023-08-13 NOTE — PHARMACOTHERAPY INTERVENTION NOTE - COMMENTS
As per policy, discontinued the vancomycin trough order since vancomycin was discontinued.    Tiffany Parikh, PharmD, [facility]  Clinical Pharmacy Specialist, Infectious Diseases  Tele-Antimicrobial Stewardship Program (Tele-ASP)  Tele-ASP Phone: [phone]

## 2023-08-13 NOTE — PROGRESS NOTE ADULT - SUBJECTIVE AND OBJECTIVE BOX
Patient is a 58y old  Female who presents with a chief complaint of       Over Night Events:    Afebrile   On low dose Levophed         ROS:  See HPI    PHYSICAL EXAM    ICU Vital Signs Last 24 Hrs  T(C): 36.3 (13 Aug 2023 07:50), Max: 37.5 (12 Aug 2023 17:00)  T(F): 97.3 (13 Aug 2023 07:50), Max: 99.5 (12 Aug 2023 17:00)  HR: 63 (13 Aug 2023 07:50) (63 - 110)  BP: 112/56 (13 Aug 2023 07:50) (83/53 - 140/63)  BP(mean): 81 (13 Aug 2023 07:50) (58 - 91)  ABP: --  ABP(mean): --  RR: 20 (13 Aug 2023 06:00) (18 - 22)  SpO2: 94% (13 Aug 2023 06:00) (94% - 99%)    O2 Parameters below as of 12 Aug 2023 23:08  Patient On (Oxygen Delivery Method): nasal cannula            General: NAD   HEENT: NUVIA             Lymphatic system: No cervical LN   Lungs: Bilateral BS, coarse   Cardiovascular: Regular   Gastrointestinal: Soft, Positive BS  Extremities: No clubbing.  Moves extremities.  Full Range of motion. Contracted   Skin: Warm, intact  Neurological: No motor or sensory deficit; contracted, limited mobility       08-12-23 @ 07:01  -  08-13-23 @ 07:00  --------------------------------------------------------  IN:    IV PiggyBack: 200 mL    Norepinephrine: 16.5 mL    Oral Fluid: 400 mL  Total IN: 616.5 mL    OUT:    Voided (mL): 1900 mL  Total OUT: 1900 mL    Total NET: -1283.5 mL          LABS:                            8.4    23.58 )-----------( 394      ( 13 Aug 2023 06:14 )             27.0                                               08-13    140  |  100  |  14  ----------------------------<  136<H>  3.5   |  28  |  0.7    Ca    8.5      13 Aug 2023 06:14  Mg     2.1     08-13    TPro  6.4  /  Alb  3.4<L>  /  TBili  0.3  /  DBili  x   /  AST  27  /  ALT  19  /  AlkPhos  85  08-12      PT/INR - ( 12 Aug 2023 01:03 )   PT: 11.60 sec;   INR: 1.02 ratio         PTT - ( 12 Aug 2023 01:03 )  PTT:25.8 sec                                       Urinalysis Basic - ( 13 Aug 2023 06:14 )    Color: x / Appearance: x / SG: x / pH: x  Gluc: 136 mg/dL / Ketone: x  / Bili: x / Urobili: x   Blood: x / Protein: x / Nitrite: x   Leuk Esterase: x / RBC: x / WBC x   Sq Epi: x / Non Sq Epi: x / Bacteria: x                                                  LIVER FUNCTIONS - ( 12 Aug 2023 11:25 )  Alb: 3.4 g/dL / Pro: 6.4 g/dL / ALK PHOS: 85 U/L / ALT: 19 U/L / AST: 27 U/L / GGT: x                                                  Culture - Urine (collected 12 Aug 2023 02:54)  Source: Clean Catch Clean Catch (Midstream)  Final Report (13 Aug 2023 06:44):    No growth    Culture - Blood (collected 12 Aug 2023 01:03)  Source: .Blood Blood-Peripheral  Preliminary Report (13 Aug 2023 09:02):    No growth at 24 hours    Culture - Blood (collected 12 Aug 2023 01:03)  Source: .Blood Blood-Peripheral  Preliminary Report (13 Aug 2023 09:02):    No growth at 24 hours                                                                                           MEDICATIONS  (STANDING):  enoxaparin Injectable 40 milliGRAM(s) SubCutaneous every 24 hours  gabapentin 600 milliGRAM(s) Oral daily  lactated ringers. 1000 milliLiter(s) (75 mL/Hr) IV Continuous <Continuous>  melatonin 5 milliGRAM(s) Oral at bedtime  meropenem  IVPB 1000 milliGRAM(s) IV Intermittent every 8 hours  multivitamin 1 Tablet(s) Oral daily  norepinephrine Infusion 0.05 MICROgram(s)/kG/Min (5.53 mL/Hr) IV Continuous <Continuous>  pantoprazole  Injectable 40 milliGRAM(s) IV Push daily  raloxifene 60 milliGRAM(s) Oral daily  vancomycin  IVPB 500 milliGRAM(s) IV Intermittent every 12 hours    MEDICATIONS  (PRN):  acetaminophen     Tablet .. 650 milliGRAM(s) Oral every 6 hours PRN Temp greater or equal to 38C (100.4F), Mild Pain (1 - 3)

## 2023-08-13 NOTE — PROGRESS NOTE ADULT - ASSESSMENT
58 year old Female with a PMHx of Down syndrome, nonverbal at baseline, hypothyroidism, cerebral palsy, congenital pulmonary stenosis brought in by EMS for SOB. Pt was discharged one day prior to admission for UTI/PNA sepsis w/ EBSL on meropenem.    Septic shock, POA - now on levophed   Acute Cystitis  Multifocal PNA/Suspected Aspiration   Acute Hypoxic Respiratory Failure on venturi mask in the ED  Lactic Acidosis - resolved  CT A/P in the ED showed Diffuse urinary bladder wall thickening, compatible with urinary bladder cystitis, infection. No hydronephrosis or renal calculus. Increased right lower lobe, decreased left lower lobe airspace opacities, compatible with pneumonia in the appropriate clinical setting.   Currently on levophed 0.01mcg. DC and monitor. If hypotensive, trial of midodrine Q8H  - s/p venturi mask, now on NC   - Procal 10<--- 0.6 on discharge  Ucx 08/04: ESBL E.Coli, Blood cx 8/12 - NGTD, nasal MRSA neg   - dc vancomycin, c/w meropenem for now  - s/s eval     #Down syndrome  #Congential P stenosis  TTE:  1. Suboptimal and very limited study.   2. Left ventricular ejection fraction, by visual estimation, is >55%.   3. Normal global left ventricular systolic function.   4. The left ventricular diastolic function could not be assessed in this   study.    #Osteoporosis  - Raloxifene     #Peripheral Neuropathy  - Gabapentin    #Hypothyroidism  - TSH - 1.63    - DVT Prophylaxis: Lovenox    #Progress Note Handoff  Pending (specify): Cont IV antibiotics, ID f/u, wean off o2, wean off pressors  Family discussion: dw aid by bedside regarding tx for sepsis  Disposition: care home .   58 year old Female with a PMHx of Down syndrome, nonverbal at baseline, hypothyroidism, cerebral palsy, congenital pulmonary stenosis brought in by EMS for SOB. Pt was discharged one day prior to admission for UTI/PNA sepsis w/ EBSL on meropenem.    Septic shock, POA - now on levophed   Acute Cystitis  Multifocal PNA/Suspected Aspiration   Acute Hypoxic Respiratory Failure on venturi mask in the ED  Lactic Acidosis - resolved  CT A/P in the ED showed Diffuse urinary bladder wall thickening, compatible with urinary bladder cystitis, infection. No hydronephrosis or renal calculus. Increased right lower lobe, decreased left lower lobe airspace opacities, compatible with pneumonia in the appropriate clinical setting.   Currently on levophed 0.01mcg. DC and monitor. If hypotensive, trial of midodrine Q8H  - s/p venturi mask, now on NC   - Procal 10 on 8/12<--- 0.6 8/10  Ucx 08/04: ESBL E.Coli, Blood cx 8/12 - NGTD, nasal MRSA neg   - dc vancomycin, c/w meropenem for now, c/w gentle hydration LR 75cc/hr  - s/s eval   - repeat AM xray     Down syndrome  Congential P stenosis  TTE:  1. Suboptimal and very limited study.   2. Left ventricular ejection fraction, by visual estimation, is >55%.   3. Normal global left ventricular systolic function.   4. The left ventricular diastolic function could not be assessed in this   study.  - avoid fluid overload     Osteoporosis - Raloxifene     Peripheral Neuropathy - Gabapentin    - DVT Prophylaxis: Lovenox    OVerall prognosis is poor    Pending tapering pressors, cultures, clinical improvement

## 2023-08-13 NOTE — PROGRESS NOTE ADULT - ASSESSMENT
ASSESSMENT  58 year old Female with a PMHx of Down syndrome, nonverbal at baseline, hypothyroidism, cerebral palsy, congenital pulmonary stenosis brought in by EMS for SOB. Pt was discharged yesterday for UTI/PNA sepsis w/ EBSL on meropenem. Pt brought from NH for worsening SOB. As per EMS pt was saturating low to the 80s. She was placed on on 6L NC and her saturation went up to 96%. History was obtained from aide.  Recent prolonged hospital course- she was admitted with Severe sepsis on admission T>101 P>90 WBC 16--> 6 lactic acidosis, Shock requiring pressorsInitially had hypoxemic resp failure in the setting of coffee-ground emesis, CT with some opacities      8/4 UCX ESBL ecoli S macrobid  procalcitonin 7/92 8/4 BCX NGTD   By the time ID was consulted in the hospital course, the patient she clinically improved (Despite being on cefepime--> ceftriaxone), was Afebrile, normal WBC. She was given a few doses of meropenem and D/C on PO macrobid. Now readmitted for respiratory failure.     IMPRESSION  #Severe sepsis on admission T>101 P>90 WBC > 12 lactic acidosis    8/12 Blood & Urine cxs NGTD   Acute cystitis, possible aspiration/GNR PNA    Rapid RVP Result: NotDetec (08-12-23 @ 01:33)    MRSA PCR Result.: Negative (08-04-23 @ 14:52)  CTAP Since 8/4/2023:  Diffuse urinary bladder wall thickening, compatible with urinary bladder cystitis, infection.  No hydronephrosis or renal calculus.  Increased right lower lobe, decreased left lower lobe airspace opacities, compatible with pneumonia in the appropriate clinical setting.    UA Blood: x / Protein: 100 mg/dL / Nitrite: Negative   Leuk Esterase: Large / RBC: 3 /HPF / WBC >998 /HPF   Sq Epi: x / Non Sq Epi: 5 /HPF / Bacteria: Many /HPF    8/4 UCX   >100,000 CFU/ml Escherichia coli ESBL  Amoxicillin/Clavulanic Acid: S <=8/4 Nitrofurantoin: S <=32 trimethoprim/Sulfamethoxazole: R >2/38    Procalcitonin, Serum: 0.63 ng/mL (08-10-23 @ 08:46)    Procalcitonin, Serum: 7.82 ng/mL (08-04-23 @ 16:13)  #CXR Left basilar opacity  #Abx allergy: No Known Allergies    Creatinine: 1.0 (08-12-23 @ 01:03)    Height (cm): 142.2 (08-12-23 @ 00:41)  Weight (kg): 59 (08-12-23 @ 00:48)    RECOMMENDATIONS  - meropenem  IVPB 1000 milliGRAM(s) IV Intermittent every 8 hours empiric 7 days   - D/C Vanco, BCX NG, recent MRSA PCR negative   - Poor prognosis, GOC    If any questions, please send a message or call on [employer]  Please continue to update ID with any pertinent new laboratory or radiographic findings

## 2023-08-14 LAB
ALBUMIN SERPL ELPH-MCNC: 3.1 G/DL — LOW (ref 3.5–5.2)
ALP SERPL-CCNC: 72 U/L — SIGNIFICANT CHANGE UP (ref 30–115)
ALT FLD-CCNC: 20 U/L — SIGNIFICANT CHANGE UP (ref 0–41)
ANION GAP SERPL CALC-SCNC: 8 MMOL/L — SIGNIFICANT CHANGE UP (ref 7–14)
AST SERPL-CCNC: 24 U/L — SIGNIFICANT CHANGE UP (ref 0–41)
BILIRUB SERPL-MCNC: <0.2 MG/DL — SIGNIFICANT CHANGE UP (ref 0.2–1.2)
BUN SERPL-MCNC: 17 MG/DL — SIGNIFICANT CHANGE UP (ref 10–20)
CALCIUM SERPL-MCNC: 8.5 MG/DL — SIGNIFICANT CHANGE UP (ref 8.4–10.4)
CHLORIDE SERPL-SCNC: 101 MMOL/L — SIGNIFICANT CHANGE UP (ref 98–110)
CO2 SERPL-SCNC: 29 MMOL/L — SIGNIFICANT CHANGE UP (ref 17–32)
CREAT SERPL-MCNC: 0.8 MG/DL — SIGNIFICANT CHANGE UP (ref 0.7–1.5)
EGFR: 86 ML/MIN/1.73M2 — SIGNIFICANT CHANGE UP
GLUCOSE SERPL-MCNC: 100 MG/DL — HIGH (ref 70–99)
HCT VFR BLD CALC: 29 % — LOW (ref 37–47)
HGB BLD-MCNC: 9 G/DL — LOW (ref 12–16)
LACTATE SERPL-SCNC: 1.5 MMOL/L — SIGNIFICANT CHANGE UP (ref 0.7–2)
MAGNESIUM SERPL-MCNC: 2.1 MG/DL — SIGNIFICANT CHANGE UP (ref 1.8–2.4)
MCHC RBC-ENTMCNC: 24.5 PG — LOW (ref 27–31)
MCHC RBC-ENTMCNC: 31 G/DL — LOW (ref 32–37)
MCV RBC AUTO: 79 FL — LOW (ref 81–99)
MRSA PCR RESULT.: NEGATIVE — SIGNIFICANT CHANGE UP
NRBC # BLD: 0 /100 WBCS — SIGNIFICANT CHANGE UP (ref 0–0)
PLATELET # BLD AUTO: 421 K/UL — HIGH (ref 130–400)
PMV BLD: 9.9 FL — SIGNIFICANT CHANGE UP (ref 7.4–10.4)
POTASSIUM SERPL-MCNC: 3.8 MMOL/L — SIGNIFICANT CHANGE UP (ref 3.5–5)
POTASSIUM SERPL-SCNC: 3.8 MMOL/L — SIGNIFICANT CHANGE UP (ref 3.5–5)
PROT SERPL-MCNC: 6.1 G/DL — SIGNIFICANT CHANGE UP (ref 6–8)
RBC # BLD: 3.67 M/UL — LOW (ref 4.2–5.4)
RBC # FLD: 15.8 % — HIGH (ref 11.5–14.5)
SODIUM SERPL-SCNC: 138 MMOL/L — SIGNIFICANT CHANGE UP (ref 135–146)
WBC # BLD: 11.35 K/UL — HIGH (ref 4.8–10.8)
WBC # FLD AUTO: 11.35 K/UL — HIGH (ref 4.8–10.8)

## 2023-08-14 PROCEDURE — 99233 SBSQ HOSP IP/OBS HIGH 50: CPT

## 2023-08-14 PROCEDURE — 71045 X-RAY EXAM CHEST 1 VIEW: CPT | Mod: 26

## 2023-08-14 RX ADMIN — GABAPENTIN 600 MILLIGRAM(S): 400 CAPSULE ORAL at 12:47

## 2023-08-14 RX ADMIN — ENOXAPARIN SODIUM 40 MILLIGRAM(S): 100 INJECTION SUBCUTANEOUS at 09:00

## 2023-08-14 RX ADMIN — Medication 1 TABLET(S): at 12:48

## 2023-08-14 RX ADMIN — MEROPENEM 100 MILLIGRAM(S): 1 INJECTION INTRAVENOUS at 12:50

## 2023-08-14 RX ADMIN — MEROPENEM 100 MILLIGRAM(S): 1 INJECTION INTRAVENOUS at 21:36

## 2023-08-14 RX ADMIN — Medication 5 MILLIGRAM(S): at 21:35

## 2023-08-14 RX ADMIN — PANTOPRAZOLE SODIUM 40 MILLIGRAM(S): 20 TABLET, DELAYED RELEASE ORAL at 12:47

## 2023-08-14 RX ADMIN — MIDODRINE HYDROCHLORIDE 5 MILLIGRAM(S): 2.5 TABLET ORAL at 21:35

## 2023-08-14 RX ADMIN — MIDODRINE HYDROCHLORIDE 5 MILLIGRAM(S): 2.5 TABLET ORAL at 06:31

## 2023-08-14 RX ADMIN — MEROPENEM 100 MILLIGRAM(S): 1 INJECTION INTRAVENOUS at 06:32

## 2023-08-14 RX ADMIN — CHLORHEXIDINE GLUCONATE 1 APPLICATION(S): 213 SOLUTION TOPICAL at 06:32

## 2023-08-14 RX ADMIN — MIDODRINE HYDROCHLORIDE 5 MILLIGRAM(S): 2.5 TABLET ORAL at 12:48

## 2023-08-14 RX ADMIN — RALOXIFENE HYDROCHLORIDE 60 MILLIGRAM(S): 60 TABLET, COATED ORAL at 12:47

## 2023-08-14 NOTE — PROGRESS NOTE ADULT - ASSESSMENT
Impression:     UTI - sepsis POA  Possible aspiration pneumonia/pneumonitis  Acute hypoxemic respiratory failure  Down syndrome     Plan:       CNS: Non verbal at baseline. Avoid depressants.     HEENT: Oral care.    PULMONARY:  HOB @ 45 degrees. Keep spo2 92 to 96%, aspiration precaution    CARDIOVASCULAR: Keep equal balance. Avoid overload. Normal EF on recent echo.  LR at 75cc/hr. Midodrine 10 mg TID    GI: GI prophylaxis.  Feeding: speech and swallow eval.    RENAL:  Follow up lytes.  Correct as needed.     INFECTIOUS DISEASE: On Meropenem IV per ID.    HEMATOLOGICAL:  DVT prophylaxis: Lovenox daily. Keep hgb more than 7.     ENDOCRINE:  Follow up FS.  Insulin protocol if needed.    MUSCULOSKELETAL: Limited status at baseline.     floor if remains off pressors

## 2023-08-14 NOTE — PROGRESS NOTE ADULT - ASSESSMENT
58 year old Female with a PMHx of Down syndrome, nonverbal at baseline, hypothyroidism, cerebral palsy, congenital pulmonary stenosis brought in by EMS for SOB. Pt was discharged one day prior to admission for UTI/PNA sepsis w/ EBSL on meropenem.    Septic shock, POA - now on levophed   Acute Cystitis  Multifocal PNA/Suspected Aspiration   Acute Hypoxic Respiratory Failure on venturi mask in the ED  Lactic Acidosis - resolved  CT A/P in the ED showed Diffuse urinary bladder wall thickening, compatible with urinary bladder cystitis, infection. No hydronephrosis or renal calculus. Increased right lower lobe, decreased left lower lobe airspace opacities, compatible with pneumonia in the appropriate clinical setting.   Currently off levophed, on midodrine 5 q8h  - s/p venturi mask, now on room air   - Procal 10 on 8/12<--- 0.6 8/10  Ucx 08/04: ESBL E.Coli, Blood cx 8/12 - NGTD, nasal MRSA neg, Ucx 8/12 - NGTD   -  c/w meropenem for now, ID following   - tolerating PO diet, can dc IVF     Down syndrome  Congential P stenosis  TTE:  1. Suboptimal and very limited study.   2. Left ventricular ejection fraction, by visual estimation, is >55%.   3. Normal global left ventricular systolic function.   4. The left ventricular diastolic function could not be assessed in this   study.  - avoid fluid overload     Osteoporosis - Raloxifene     Peripheral Neuropathy - Gabapentin    - DVT Prophylaxis: Lovenox    OVerall prognosis is poor    Pending clinical improvement, ID fu, pending cultures

## 2023-08-14 NOTE — PROGRESS NOTE ADULT - SUBJECTIVE AND OBJECTIVE BOX
Over Night Events: events noted, off levophed on IVF, ID noted    PHYSICAL EXAM    ICU Vital Signs Last 24 Hrs  T(C): 36.6 (14 Aug 2023 04:45), Max: 36.7 (14 Aug 2023 00:42)  T(F): 97.9 (14 Aug 2023 04:45), Max: 98.1 (14 Aug 2023 00:42)  HR: 83 (14 Aug 2023 04:45) (63 - 86)  BP: 100/73 (14 Aug 2023 04:45) (97/57 - 137/58)  BP(mean): 82 (14 Aug 2023 04:45) (76 - 84)-  RR: 18 (14 Aug 2023 04:45) (18 - 18)  SpO2: 98% (14 Aug 2023 04:45) (95% - 99%)    O2 Parameters below as of 14 Aug 2023 04:45  Patient On (Oxygen Delivery Method): room air            General: ill looking  Lungs: Bilateral rhonchi  Cardiovascular: Regular   Abdomen: Soft, Positive BS  Extremities: No clubbing   Neurological: Non focal       08-13-23 @ 07:01  -  08-14-23 @ 07:00  --------------------------------------------------------  IN:  Total IN: 0 mL    OUT:    Voided (mL): 1000 mL  Total OUT: 1000 mL    Total NET: -1000 mL          LABS:                          9.0    11.35 )-----------( 421      ( 14 Aug 2023 05:22 )             29.0                                               08-14    138  |  101  |  17  ----------------------------<  100<H>  3.8   |  29  |  0.8    Ca    8.5      14 Aug 2023 05:22  Mg     2.1     08-14    TPro  6.1  /  Alb  3.1<L>  /  TBili  <0.2  /  DBili  x   /  AST  24  /  ALT  20  /  AlkPhos  72  08-14                                             Urinalysis Basic - ( 14 Aug 2023 05:22 )    Color: x / Appearance: x / SG: x / pH: x  Gluc: 100 mg/dL / Ketone: x  / Bili: x / Urobili: x   Blood: x / Protein: x / Nitrite: x   Leuk Esterase: x / RBC: x / WBC x   Sq Epi: x / Non Sq Epi: x / Bacteria: x                                                  LIVER FUNCTIONS - ( 14 Aug 2023 05:22 )  Alb: 3.1 g/dL / Pro: 6.1 g/dL / ALK PHOS: 72 U/L / ALT: 20 U/L / AST: 24 U/L / GGT: x                                                  Culture - Urine (collected 12 Aug 2023 02:54)  Source: Clean Catch Clean Catch (Midstream)  Final Report (13 Aug 2023 06:44):    No growth    Culture - Blood (collected 12 Aug 2023 01:03)  Source: .Blood Blood-Peripheral  Preliminary Report (13 Aug 2023 09:02):    No growth at 24 hours    Culture - Blood (collected 12 Aug 2023 01:03)  Source: .Blood Blood-Peripheral  Preliminary Report (13 Aug 2023 09:02):    No growth at 24 hours                                                                                           MEDICATIONS  (STANDING):  chlorhexidine 2% Cloths 1 Application(s) Topical <User Schedule>  enoxaparin Injectable 40 milliGRAM(s) SubCutaneous every 24 hours  gabapentin 600 milliGRAM(s) Oral daily  lactated ringers. 1000 milliLiter(s) (75 mL/Hr) IV Continuous <Continuous>  melatonin 5 milliGRAM(s) Oral at bedtime  meropenem  IVPB 1000 milliGRAM(s) IV Intermittent every 8 hours  midodrine 5 milliGRAM(s) Oral every 8 hours  multivitamin 1 Tablet(s) Oral daily  norepinephrine Infusion 0.05 MICROgram(s)/kG/Min (5.53 mL/Hr) IV Continuous <Continuous>  pantoprazole  Injectable 40 milliGRAM(s) IV Push daily  raloxifene 60 milliGRAM(s) Oral daily    MEDICATIONS  (PRN):  acetaminophen     Tablet .. 650 milliGRAM(s) Oral every 6 hours PRN Temp greater or equal to 38C (100.4F), Mild Pain (1 - 3)

## 2023-08-14 NOTE — CHART NOTE - NSCHARTNOTEFT_GEN_A_CORE
SDU Transfer Note    Transfer from: SDU    Transfer to: ( x ) Medicine        HPI:  Ms. Linton is a 58 year old Female with a PMHx of Down syndrome, nonverbal at baseline, hypothyroidism, cerebral palsy, congenital pulmonary stenosis brought in by EMS for SOB from NH. Pt was recently admitted requiring ICU care and discharged on  for UTI/PNA sepsis w/ EBSL on meropenem. As per EMS pt was saturating low to the 80s. In the ED pt was febrile, and mildly hypotensive. She was placed on on 6L NC and her saturation went up to 96%. Labs in the ED were significant for WBC 14, Lactate 2.6, +UA. CT A/P in the ED showed Diffuse urinary bladder wall thickening, compatible with urinary bladder cystitis, infection. No hydronephrosis or renal calculus. Increased right lower lobe, decreased left lower lobe airspace opacities, compatible with pneumonia in the appropriate clinical setting. The pt was started on vanc and meropenem and admitted to SDU for Sepsis 2/2 PNA.      CEU Course: Pt was hypotensive requiring levophed drip and gentle hydration. Pts previous urine cultures were positive for ESBL and blood cultures negative to date, vanc was discontinued. Pt was weaned off of levophed and given 5mg midodrine.          Follow-up:  -           Vital Signs Last 24 Hrs  T(C): 35.8 (14 Aug 2023 11:20), Max: 36.7 (14 Aug 2023 00:42)  T(F): 96.4 (14 Aug 2023 11:20), Max: 98.1 (14 Aug 2023 00:42)  HR: 65 (14 Aug 2023 11:44) (65 - 99)  BP: 104/52 (14 Aug 2023 11:44) (86/44 - 104/60)  BP(mean): 68 (14 Aug 2023 11:44) (68 - 82)  RR: 20 (14 Aug 2023 11:20) (18 - 20)  SpO2: 97% (14 Aug 2023 11:20) (95% - 99%)    Parameters below as of 14 Aug 2023 04:45  Patient On (Oxygen Delivery Method): room air        I&O's Summary    13 Aug 2023 07:01  -  14 Aug 2023 07:00  --------------------------------------------------------  IN: 0 mL / OUT: 1000 mL / NET: -1000 mL        Physical Exam:       LABS:                               9.0    11.35 )-----------( 421      ( 14 Aug 2023 05:22 )             29.0       08-14    138  |  101  |  17  ----------------------------<  100<H>  3.8   |  29  |  0.8    Ca    8.5      14 Aug 2023 05:22  Mg     2.1         TPro  6.1  /  Alb  3.1<L>  /  TBili  <0.2  /  DBili  x   /  AST  24  /  ALT  20  /  AlkPhos  72                  EC Lead ECG:   Ventricular Rate 114 BPM    Atrial Rate 114 BPM    P-R Interval 116 ms    QRS Duration 64 ms    Q-T Interval 314 ms    QTC Calculation(Bazett) 432 ms    P Axis 36 degrees    R Axis 66 degrees    T Axis 42 degrees    Diagnosis Line Sinus tachycardia  Otherwise normal ECG    Confirmed by Justice Stockton ([id number]) on 2023 10:23:41 AM (23 @ 01:08)    Telemetry:    Imaging:      ASSESSMENT & PLAN:   58 year old Female with a PMHx of Down syndrome, nonverbal at baseline, hypothyroidism, cerebral palsy, congenital pulmonary stenosis brought in by EMS for SOB. Pt was discharged one day prior to admission for UTI/PNA sepsis w/ EBSL on meropenem.    #Septic shock, POA  #Acute Cystitis  #Multifocal PNA  #Acute Hypoxic Respiratory Failure  CT A/P in the ED showed Diffuse urinary bladder wall thickening, compatible with urinary bladder cystitis, infection. No hydronephrosis or renal calculus. Increased right lower lobe, decreased left lower lobe airspace opacities, compatible with pneumonia in the appropriate clinical setting.   Currently on levophed 0.1mcg  on Ventimask 50% saturating 100%  Ucx : ESBL E.Coli  - IV antibiotics: c/w meropenem and vancomycin  - ID consult  - f/u blood Cx, UCx, RVP  - Wean off O2 as tolerated, use HFNC if needed  - Wean off pressors as tolerated    #Down syndrome  #Congential P stenosis  TTE:  1. Suboptimal and very limited study.   2. Left ventricular ejection fraction, by visual estimation, is >55%.   3. Normal global left ventricular systolic function.   4. The left ventricular diastolic function could not be assessed in this   study.    #Osteoporosis  - Raloxifene     #Peripheral Neuropathy  - Gabapentin    #Hypothyroidism  - TSH - 1.63    - DVT Prophylaxis: Lovenox    #Progress Note Handoff  Pending (specify): Cont IV antibiotics, ID f/u, wean off o2, wean off pressors  Family discussion: dw aid by bedside regarding tx for sepsis  Disposition: [facility] . SDU Transfer Note    Transfer from: SDU    Transfer to: ( x ) Medicine        HPI:  Ms. Linton is a 58 year old Female with a PMHx of Down syndrome, nonverbal at baseline, hypothyroidism, cerebral palsy, congenital pulmonary stenosis brought in by EMS for SOB from NH. Pt was recently admitted requiring ICU care and discharged on  for UTI/PNA sepsis w/ EBSL on meropenem. As per EMS pt was saturating low to the 80s. In the ED pt was febrile, and mildly hypotensive. She was placed on on 6L NC and her saturation went up to 96%. Labs in the ED were significant for WBC 14, Lactate 2.6, +UA. CT A/P in the ED showed Diffuse urinary bladder wall thickening, compatible with urinary bladder cystitis, infection. No hydronephrosis or renal calculus. Increased right lower lobe, decreased left lower lobe airspace opacities, compatible with pneumonia in the appropriate clinical setting. The pt was started on vanc and meropenem and admitted to SDU for Sepsis 2/2 PNA.      CEU Course: Pt was hypotensive requiring levophed drip and gentle hydration. Pts previous urine cultures were positive for ESBL and blood cultures negative to date, vanc was discontinued. Pt was weaned off of levophed and given 5mg midodrine. Pt was tolerated PO diet well and IVF were discontinued. Pt is stable for downgrade to floor.      Follow-up:  - continue abx, ID following  - final bcx results          Vital Signs Last 24 Hrs  T(C): 35.8 (14 Aug 2023 11:20), Max: 36.7 (14 Aug 2023 00:42)  T(F): 96.4 (14 Aug 2023 11:20), Max: 98.1 (14 Aug 2023 00:42)  HR: 65 (14 Aug 2023 11:44) (65 - 99)  BP: 104/52 (14 Aug 2023 11:44) (86/44 - 104/60)  BP(mean): 68 (14 Aug 2023 11:44) (68 - 82)  RR: 20 (14 Aug 2023 11:20) (18 - 20)  SpO2: 97% (14 Aug 2023 11:20) (95% - 99%)    Parameters below as of 14 Aug 2023 04:45  Patient On (Oxygen Delivery Method): room air        I&O's Summary    13 Aug 2023 07:01  -  14 Aug 2023 07:00  --------------------------------------------------------  IN: 0 mL / OUT: 1000 mL / NET: -1000 mL        Physical Exam:       LABS:                               9.0    11.35 )-----------( 421      ( 14 Aug 2023 05:22 )             29.0       08-14    138  |  101  |  17  ----------------------------<  100<H>  3.8   |  29  |  0.8    Ca    8.5      14 Aug 2023 05:22  Mg     2.1         TPro  6.1  /  Alb  3.1<L>  /  TBili  <0.2  /  DBili  x   /  AST  24  /  ALT  20  /  AlkPhos  72  -                EC Lead ECG:   Ventricular Rate 114 BPM    Atrial Rate 114 BPM    P-R Interval 116 ms    QRS Duration 64 ms    Q-T Interval 314 ms    QTC Calculation(Bazett) 432 ms    P Axis 36 degrees    R Axis 66 degrees    T Axis 42 degrees    Diagnosis Line Sinus tachycardia  Otherwise normal ECG    Confirmed by Justice Stockton ([id number]) on 2023 10:23:41 AM (23 @ 01:08)    Telemetry:    Imaging:      ASSESSMENT & PLAN:   58 year old Female with a PMHx of Down syndrome, nonverbal at baseline, hypothyroidism, cerebral palsy, congenital pulmonary stenosis brought in by EMS for SOB. Pt was discharged one day prior to admission for UTI/PNA sepsis w/ EBSL on meropenem.     #Septic shock, POA  #Acute Cystitis  #Multifocal PNA suspicious for aspiration PNA  #Acute Hypoxic Respiratory Failure  #Lactic acidosis-resolved  - CT A/P in the ED showed Diffuse urinary bladder wall thickening, compatible with urinary bladder cystitis, infection. No hydronephrosis or renal calculus. Increased right lower lobe, decreased left lower lobe airspace opacities, compatible with pneumonia in the appropriate clinical setting.   - weaned off pressors  - on RA satting >90%  - Ucx : + ESBL  - this admission bcx NGTD, ucx NGTD, RVP neg-c/w meropenem  - ID following      #Down syndrome  #Congential P stenosis  TTE:  1. Suboptimal and very limited study.   2. Left ventricular ejection fraction, by visual estimation, is >55%.   3. Normal global left ventricular systolic function.   4. The left ventricular diastolic function could not be assessed in this study.    #Osteoporosis - Raloxifene     #Peripheral Neuropathy  - Gabapentin    #Hypothyroidism - TSH - 1.63      #DVT ppx: Lovenox  #GI pp: protonix  #Diet: Pureed thick liquids  #Dispo: Floor SDU Transfer Note    Transfer from: SDU    Transfer to: ( x ) Medicine        HPI:  Ms. Linton is a 58 year old Female with a PMHx of Down syndrome, nonverbal at baseline, hypothyroidism, cerebral palsy, congenital pulmonary stenosis brought in by EMS for SOB from NH. Pt was recently admitted requiring ICU care and discharged on  for UTI/PNA sepsis w/ EBSL on meropenem. As per EMS pt was saturating low to the 80s. In the ED pt was febrile, and mildly hypotensive. She was placed on on 6L NC and her saturation went up to 96%. Labs in the ED were significant for WBC 14, Lactate 2.6, +UA. CT A/P in the ED showed Diffuse urinary bladder wall thickening, compatible with urinary bladder cystitis, infection. No hydronephrosis or renal calculus. Increased right lower lobe, decreased left lower lobe airspace opacities, compatible with pneumonia in the appropriate clinical setting. The pt was started on vanc and meropenem and admitted to SDU for Sepsis 2/2 PNA.      CEU Course: Pt was hypotensive requiring levophed drip and gentle hydration. Pts previous urine cultures were positive for ESBL and blood cultures negative to date, vanc was discontinued. Pt was weaned off of levophed and given 5mg midodrine. Pt was tolerated PO diet well and IVF were discontinued. Pt is stable for downgrade to floor.      Follow-up:  - continue abx, ID following  - final bcx results          Vital Signs Last 24 Hrs  T(C): 35.8 (14 Aug 2023 11:20), Max: 36.7 (14 Aug 2023 00:42)  T(F): 96.4 (14 Aug 2023 11:20), Max: 98.1 (14 Aug 2023 00:42)  HR: 65 (14 Aug 2023 11:44) (65 - 99)  BP: 104/52 (14 Aug 2023 11:44) (86/44 - 104/60)  BP(mean): 68 (14 Aug 2023 11:44) (68 - 82)  RR: 20 (14 Aug 2023 11:20) (18 - 20)  SpO2: 97% (14 Aug 2023 11:20) (95% - 99%)    Parameters below as of 14 Aug 2023 04:45  Patient On (Oxygen Delivery Method): room air        I&O's Summary    13 Aug 2023 07:01  -  14 Aug 2023 07:00  --------------------------------------------------------  IN: 0 mL / OUT: 1000 mL / NET: -1000 mL        Physical Exam:   General: NAD, lying in bed  Head:  Atraumatic, Normocephalic  Eyes: conjunctiva and sclera clear  ENT: Moist mucous membranes  Pulm: Clear to auscultation bilaterally; No rales, rhonchi, wheezing, or rubs. Unlabored respirations, pt is on RA  CVS: Regular rate and rhythm; No murmurs, rubs, or gallops  Abdomen: Soft, nontender, nondistended  Extremities:  No clubbing, cyanosis, or edema  Nervous System:  A&Ox3        LABS:                               9.0    11.35 )-----------( 421      ( 14 Aug 2023 05:22 )             29.0       08-    138  |  101  |  17  ----------------------------<  100<H>  3.8   |  29  |  0.8    Ca    8.5      14 Aug 2023 05:22  Mg     2.1         TPro  6.1  /  Alb  3.1<L>  /  TBili  <0.2  /  DBili  x   /  AST  24  /  ALT  20  /  AlkPhos  72  08-14                EC Lead ECG:   Ventricular Rate 114 BPM    Atrial Rate 114 BPM    P-R Interval 116 ms    QRS Duration 64 ms    Q-T Interval 314 ms    QTC Calculation(Bazett) 432 ms    P Axis 36 degrees    R Axis 66 degrees    T Axis 42 degrees    Diagnosis Line Sinus tachycardia  Otherwise normal ECG    Confirmed by Justice Stockton ([id number]) on 2023 10:23:41 AM (23 @ 01:08)    Telemetry:    Imaging:      ASSESSMENT & PLAN:   58 year old Female with a PMHx of Down syndrome, nonverbal at baseline, hypothyroidism, cerebral palsy, congenital pulmonary stenosis brought in by EMS for SOB. Pt was discharged one day prior to admission for UTI/PNA sepsis w/ EBSL on meropenem.     #Septic shock, POA  #Acute Cystitis  #Multifocal PNA suspicious for aspiration PNA  #Acute Hypoxic Respiratory Failure  #Lactic acidosis-resolved  - CT A/P in the ED showed Diffuse urinary bladder wall thickening, compatible with urinary bladder cystitis, infection. No hydronephrosis or renal calculus. Increased right lower lobe, decreased left lower lobe airspace opacities, compatible with pneumonia in the appropriate clinical setting.   - weaned off pressors  - on RA satting >90%  - Ucx : + ESBL  - this admission bcx NGTD, ucx NGTD, RVP neg-c/w meropenem  - ID following      #Down syndrome  #Congential P stenosis  TTE:  1. Suboptimal and very limited study.   2. Left ventricular ejection fraction, by visual estimation, is >55%.   3. Normal global left ventricular systolic function.   4. The left ventricular diastolic function could not be assessed in this study.    #Osteoporosis - Raloxifene     #Peripheral Neuropathy  - Gabapentin    #Hypothyroidism - TSH - 1.63      #DVT ppx: Lovenox  #GI pp: protonix  #Diet: Pureed thick liquids  #Dispo: Floor

## 2023-08-14 NOTE — PROGRESS NOTE ADULT - SUBJECTIVE AND OBJECTIVE BOX
TEA ECHAVARRIA  58y Female    CHIEF COMPLAINT:    Patient is a 58y old  Female who presents with a chief complaint of sepsis (14 Aug 2023 07:43)    INTERVAL HPI/OVERNIGHT EVENTS:    Patient seen and examined. No acute events overnight. off levophed this AM, afebrile     ROS: All other systems are negative.    Vital Signs:    T(F): 96.4 (23 @ 11:20), Max: 98.1 (23 @ 00:42)  HR: 65 (23 @ 11:44) (65 - 99)  BP: 104/52 (23 @ 11:44) (86/44 - 104/60)  RR: 20 (23 @ 11:20) (18 - 20)  SpO2: 97% (23 @ 11:20) (95% - 99%)    13 Aug 2023 07:01  -  14 Aug 2023 07:00  --------------------------------------------------------  IN: 0 mL / OUT: 1000 mL / NET: -1000 mL    Daily Weight in k (14 Aug 2023 00:42)    PHYSICAL EXAM:    GENERAL:  NAD  SKIN: No rashes or lesions  HEENT: Atraumatic. Normocephalic.    NECK: Supple, No JVD.    PULMONARY: decreased breath sounds  B/L. No wheezing  CVS: Normal S1, S2. Rate and Rhythm are regular  ABDOMEN/GI: Soft, Nontender, Nondistended  MSK:  No clubbing or cyanosis  NEUROLOGIC: does not follow commands  PSYCH: Awake nonverbal     Consultant(s) Notes Reviewed:  [x ] YES  [ ] NO  Care Discussed with Consultants/Other Providers [ x] YES  [ ] NO    LABS:                        9.0    11.35 )-----------( 421      ( 14 Aug 2023 05:22 )             29.0     138  |  101  |  17  ----------------------------<  100<H>  3.8   |  29  |  0.8    Ca    8.5      14 Aug 2023 05:22  Mg     2.1     08-14    TPro  6.1  /  Alb  3.1<L>  /  TBili  <0.2  /  DBili  x   /  AST  24  /  ALT  20  /  AlkPhos  72      Culture - Urine (collected 12 Aug 2023 02:54)  Source: Clean Catch Clean Catch (Midstream)  Final Report (13 Aug 2023 06:44):    No growth    Culture - Blood (collected 12 Aug 2023 01:03)  Source: .Blood Blood-Peripheral  Preliminary Report (14 Aug 2023 09:01):    No growth at 48 Hours    Culture - Blood (collected 12 Aug 2023 01:03)  Source: .Blood Blood-Peripheral  Preliminary Report (14 Aug 2023 09:01):    No growth at 48 Hours    RADIOLOGY & ADDITIONAL TESTS:  Imaging or report Personally Reviewed:  [x] YES  [ ] NO  EKG reviewed: [x] YES  [ ] NO    Medications:  Standing  chlorhexidine 2% Cloths 1 Application(s) Topical <User Schedule>  enoxaparin Injectable 40 milliGRAM(s) SubCutaneous every 24 hours  gabapentin 600 milliGRAM(s) Oral daily  melatonin 5 milliGRAM(s) Oral at bedtime  meropenem  IVPB 1000 milliGRAM(s) IV Intermittent every 8 hours  midodrine 5 milliGRAM(s) Oral every 8 hours  multivitamin 1 Tablet(s) Oral daily  norepinephrine Infusion 0.05 MICROgram(s)/kG/Min IV Continuous <Continuous>  pantoprazole  Injectable 40 milliGRAM(s) IV Push daily  raloxifene 60 milliGRAM(s) Oral daily    PRN Meds  acetaminophen     Tablet .. 650 milliGRAM(s) Oral every 6 hours PRN

## 2023-08-15 LAB
ANION GAP SERPL CALC-SCNC: 8 MMOL/L — SIGNIFICANT CHANGE UP (ref 7–14)
BASOPHILS # BLD AUTO: 0.05 K/UL — SIGNIFICANT CHANGE UP (ref 0–0.2)
BASOPHILS NFR BLD AUTO: 0.8 % — SIGNIFICANT CHANGE UP (ref 0–1)
BUN SERPL-MCNC: 15 MG/DL — SIGNIFICANT CHANGE UP (ref 10–20)
CALCIUM SERPL-MCNC: 8.4 MG/DL — SIGNIFICANT CHANGE UP (ref 8.4–10.4)
CHLORIDE SERPL-SCNC: 104 MMOL/L — SIGNIFICANT CHANGE UP (ref 98–110)
CO2 SERPL-SCNC: 28 MMOL/L — SIGNIFICANT CHANGE UP (ref 17–32)
CREAT SERPL-MCNC: 0.7 MG/DL — SIGNIFICANT CHANGE UP (ref 0.7–1.5)
EGFR: 101 ML/MIN/1.73M2 — SIGNIFICANT CHANGE UP
EOSINOPHIL # BLD AUTO: 0.22 K/UL — SIGNIFICANT CHANGE UP (ref 0–0.7)
EOSINOPHIL NFR BLD AUTO: 3.4 % — SIGNIFICANT CHANGE UP (ref 0–8)
GLUCOSE SERPL-MCNC: 128 MG/DL — HIGH (ref 70–99)
HCT VFR BLD CALC: 28.3 % — LOW (ref 37–47)
HGB BLD-MCNC: 9 G/DL — LOW (ref 12–16)
IMM GRANULOCYTES NFR BLD AUTO: 0.3 % — SIGNIFICANT CHANGE UP (ref 0.1–0.3)
LYMPHOCYTES # BLD AUTO: 1.48 K/UL — SIGNIFICANT CHANGE UP (ref 1.2–3.4)
LYMPHOCYTES # BLD AUTO: 23 % — SIGNIFICANT CHANGE UP (ref 20.5–51.1)
MANUAL DIF COMMENT BLD-IMP: SIGNIFICANT CHANGE UP
MCHC RBC-ENTMCNC: 25.1 PG — LOW (ref 27–31)
MCHC RBC-ENTMCNC: 31.8 G/DL — LOW (ref 32–37)
MCV RBC AUTO: 79.1 FL — LOW (ref 81–99)
MONOCYTES # BLD AUTO: 0.38 K/UL — SIGNIFICANT CHANGE UP (ref 0.1–0.6)
MONOCYTES NFR BLD AUTO: 5.9 % — SIGNIFICANT CHANGE UP (ref 1.7–9.3)
NEUTROPHILS # BLD AUTO: 4.28 K/UL — SIGNIFICANT CHANGE UP (ref 1.4–6.5)
NEUTROPHILS NFR BLD AUTO: 66.6 % — SIGNIFICANT CHANGE UP (ref 42.2–75.2)
NRBC # BLD: 0 /100 WBCS — SIGNIFICANT CHANGE UP (ref 0–0)
PLATELET # BLD AUTO: 428 K/UL — HIGH (ref 130–400)
PMV BLD: 9.7 FL — SIGNIFICANT CHANGE UP (ref 7.4–10.4)
POTASSIUM SERPL-MCNC: 4.1 MMOL/L — SIGNIFICANT CHANGE UP (ref 3.5–5)
POTASSIUM SERPL-SCNC: 4.1 MMOL/L — SIGNIFICANT CHANGE UP (ref 3.5–5)
RBC # BLD: 3.58 M/UL — LOW (ref 4.2–5.4)
RBC # FLD: 15.9 % — HIGH (ref 11.5–14.5)
SODIUM SERPL-SCNC: 140 MMOL/L — SIGNIFICANT CHANGE UP (ref 135–146)
WBC # BLD: 6.43 K/UL — SIGNIFICANT CHANGE UP (ref 4.8–10.8)
WBC # FLD AUTO: 6.43 K/UL — SIGNIFICANT CHANGE UP (ref 4.8–10.8)

## 2023-08-15 PROCEDURE — 99232 SBSQ HOSP IP/OBS MODERATE 35: CPT

## 2023-08-15 RX ADMIN — MEROPENEM 100 MILLIGRAM(S): 1 INJECTION INTRAVENOUS at 05:07

## 2023-08-15 RX ADMIN — Medication 5 MILLIGRAM(S): at 21:47

## 2023-08-15 RX ADMIN — RALOXIFENE HYDROCHLORIDE 60 MILLIGRAM(S): 60 TABLET, COATED ORAL at 12:37

## 2023-08-15 RX ADMIN — ENOXAPARIN SODIUM 40 MILLIGRAM(S): 100 INJECTION SUBCUTANEOUS at 09:30

## 2023-08-15 RX ADMIN — MIDODRINE HYDROCHLORIDE 5 MILLIGRAM(S): 2.5 TABLET ORAL at 05:07

## 2023-08-15 RX ADMIN — MEROPENEM 100 MILLIGRAM(S): 1 INJECTION INTRAVENOUS at 21:40

## 2023-08-15 RX ADMIN — PANTOPRAZOLE SODIUM 40 MILLIGRAM(S): 20 TABLET, DELAYED RELEASE ORAL at 12:37

## 2023-08-15 RX ADMIN — MIDODRINE HYDROCHLORIDE 5 MILLIGRAM(S): 2.5 TABLET ORAL at 13:43

## 2023-08-15 RX ADMIN — CHLORHEXIDINE GLUCONATE 1 APPLICATION(S): 213 SOLUTION TOPICAL at 05:09

## 2023-08-15 RX ADMIN — GABAPENTIN 600 MILLIGRAM(S): 400 CAPSULE ORAL at 12:37

## 2023-08-15 RX ADMIN — MEROPENEM 100 MILLIGRAM(S): 1 INJECTION INTRAVENOUS at 13:43

## 2023-08-15 RX ADMIN — Medication 1 TABLET(S): at 12:37

## 2023-08-15 RX ADMIN — MIDODRINE HYDROCHLORIDE 5 MILLIGRAM(S): 2.5 TABLET ORAL at 21:47

## 2023-08-15 NOTE — PROGRESS NOTE ADULT - ASSESSMENT
ASSESSMENT  58 year old Female with a PMHx of Down syndrome, nonverbal at baseline, hypothyroidism, cerebral palsy, congenital pulmonary stenosis brought in by EMS for SOB. Pt was discharged yesterday for UTI/PNA sepsis w/ EBSL on meropenem. Pt brought from NH for worsening SOB. As per EMS pt was saturating low to the 80s. She was placed on on 6L NC and her saturation went up to 96%. History was obtained from aide.  Recent prolonged hospital course- she was admitted with Severe sepsis on admission T>101 P>90 WBC 16--> 6 lactic acidosis, Shock requiring pressorsInitially had hypoxemic resp failure in the setting of coffee-ground emesis, CT with some opacities      8/4 UCX ESBL ecoli S macrobid  procalcitonin 7/92 8/4 BCX NGTD   By the time ID was consulted in the hospital course, the patient she clinically improved (Despite being on cefepime--> ceftriaxone), was Afebrile, normal WBC. She was given a few doses of meropenem and D/C on PO macrobid. Now readmitted for respiratory failure.     IMPRESSION  #Severe sepsis on admission T>101 P>90 WBC > 12 lactic acidosis    8/12 Blood & Urine cxs NGTD   Acute cystitis, possible aspiration/GNR PNA    Rapid RVP Result: NotDetec (08-12-23 @ 01:33)    MRSA PCR Result.: Negative (08-04-23 @ 14:52)  CTAP Since 8/4/2023:  Diffuse urinary bladder wall thickening, compatible with urinary bladder cystitis, infection.  No hydronephrosis or renal calculus.  Increased right lower lobe, decreased left lower lobe airspace opacities, compatible with pneumonia in the appropriate clinical setting.    UA Blood: x / Protein: 100 mg/dL / Nitrite: Negative   Leuk Esterase: Large / RBC: 3 /HPF / WBC >998 /HPF   Sq Epi: x / Non Sq Epi: 5 /HPF / Bacteria: Many /HPF    8/4 UCX   >100,000 CFU/ml Escherichia coli ESBL  Amoxicillin/Clavulanic Acid: S <=8/4 Nitrofurantoin: S <=32 trimethoprim/Sulfamethoxazole: R >2/38    Procalcitonin, Serum: 0.63 ng/mL (08-10-23 @ 08:46)    Procalcitonin, Serum: 7.82 ng/mL (08-04-23 @ 16:13)  #CXR Left basilar opacity  #Abx allergy: No Known Allergies    Creatinine: 1.0 (08-12-23 @ 01:03)    Height (cm): 142.2 (08-12-23 @ 00:41)  Weight (kg): 59 (08-12-23 @ 00:48)    RECOMMENDATIONS  - meropenem  IVPB 1000 milliGRAM(s) IV Intermittent every 8 hours empiric 7 days end 8/18, no alternatives (if d/c prior midline x iv ertapenem 1g q24h )  - Poor prognosis, GOC    If any questions, please send a message or call on [employer] Teams  Please continue to update ID with any pertinent new laboratory or radiographic findings

## 2023-08-15 NOTE — PROGRESS NOTE ADULT - ASSESSMENT
58 year old Female with a PMHx of Down syndrome, nonverbal at baseline, hypothyroidism, cerebral palsy, congenital pulmonary stenosis brought in by EMS for SOB. Pt was discharged one day prior to admission for UTI/PNA sepsis w/ EBSL on meropenem.    Septic shock, POA -resolved, off levophed   Acute Cystitis  Multifocal PNA/Suspected Aspiration   Acute Hypoxic Respiratory Failure on venturi mask in the ED  Lactic Acidosis - resolved  CT A/P in the ED showed Diffuse urinary bladder wall thickening, compatible with urinary bladder cystitis, infection. No hydronephrosis or renal calculus. Increased right lower lobe, decreased left lower lobe airspace opacities, compatible with pneumonia in the appropriate clinical setting.   off Levophed  If hypotensive, trial of midodrine Q8H  - s/p venturi mask, now on room air   - Procal 10 on 8/12<--- 0.6 8/10  Ucx 08/04: ESBL E.Coli, Blood cx 8/12 - NGTD, nasal MRSA neg   - c/w meropenem for now, fu id   - off IVF, avoid fluid overload  - tolerating PO diet per s/s  - repeat AM xray     Down syndrome  Congential P stenosis  TTE:  1. Suboptimal and very limited study.   2. Left ventricular ejection fraction, by visual estimation, is >55%.   3. Normal global left ventricular systolic function.   4. The left ventricular diastolic function could not be assessed in this   study.  - avoid fluid overload     Osteoporosis - Raloxifene     Peripheral Neuropathy - Gabapentin    - DVT Prophylaxis: Lovenox    OVerall prognosis is poor    Pending: labs, IV abx, ID fu   discussed with aid at bedside

## 2023-08-15 NOTE — PROGRESS NOTE ADULT - SUBJECTIVE AND OBJECTIVE BOX
JERICHOTEA  58y, Female  Allergy: No Known Allergies      LOS  3d    CHIEF COMPLAINT: sepsis (14 Aug 2023 07:43)      INTERVAL EVENTS/HPI  - T(F): , Max: 97.3 (08-15-23 @ 07:21)  - WBC Count: 11.35 (08-14-23 @ 05:22)  WBC Count: 23.58 (08-13-23 @ 06:14)     - Creatinine: 0.8 (08-14-23 @ 05:22)     -   -   -     ROS  cannot obtain secondary to patient's sedation and/or mental status    VITALS:  T(F): 97.3, Max: 97.3 (08-15-23 @ 07:21)  HR: 99  BP: 100/55  RR: 18Vital Signs Last 24 Hrs  T(C): 36.3 (15 Aug 2023 07:21), Max: 36.3 (15 Aug 2023 07:21)  T(F): 97.3 (15 Aug 2023 07:21), Max: 97.3 (15 Aug 2023 07:21)  HR: 99 (15 Aug 2023 07:21) (74 - 99)  BP: 100/55 (15 Aug 2023 07:21) (86/52 - 107/60)  BP(mean): 79 (15 Aug 2023 00:00) (72 - 79)  RR: 18 (15 Aug 2023 07:21) (18 - 19)  SpO2: 93% (15 Aug 2023 07:21) (93% - 96%)    Parameters below as of 15 Aug 2023 07:21  Patient On (Oxygen Delivery Method): room air        PHYSICAL EXAM:  Gen: chronically ill appearing   HEENT: Normocephalic, atraumatic  Neck: supple, no lymphadenopathy  CV: Regular rate & regular rhythm  Lungs: decreased BS at bases, no fremitus  Abdomen: Soft, BS present  Ext: Warm, well perfused  Neuro: non focal, not following commands  Skin: no rash, no erythema  Lines: no phlebitis   FH: Non-contributory  Social Hx: Non-contributory    TESTS & MEASUREMENTS:                        9.0    11.35 )-----------( 421      ( 14 Aug 2023 05:22 )             29.0     08-14    138  |  101  |  17  ----------------------------<  100<H>  3.8   |  29  |  0.8    Ca    8.5      14 Aug 2023 05:22  Mg     2.1     08-14    TPro  6.1  /  Alb  3.1<L>  /  TBili  <0.2  /  DBili  x   /  AST  24  /  ALT  20  /  AlkPhos  72  08-14      LIVER FUNCTIONS - ( 14 Aug 2023 05:22 )  Alb: 3.1 g/dL / Pro: 6.1 g/dL / ALK PHOS: 72 U/L / ALT: 20 U/L / AST: 24 U/L / GGT: x           Urinalysis Basic - ( 14 Aug 2023 05:22 )    Color: x / Appearance: x / SG: x / pH: x  Gluc: 100 mg/dL / Ketone: x  / Bili: x / Urobili: x   Blood: x / Protein: x / Nitrite: x   Leuk Esterase: x / RBC: x / WBC x   Sq Epi: x / Non Sq Epi: x / Bacteria: x        Culture - Urine (collected 08-12-23 @ 02:54)  Source: Clean Catch Clean Catch (Midstream)  Final Report (08-13-23 @ 06:44):    No growth    Culture - Blood (collected 08-12-23 @ 01:03)  Source: .Blood Blood-Peripheral  Preliminary Report (08-15-23 @ 09:01):    No growth at 72 Hours    Culture - Blood (collected 08-12-23 @ 01:03)  Source: .Blood Blood-Peripheral  Preliminary Report (08-15-23 @ 09:02):    No growth at 72 Hours    Culture - Urine (collected 08-04-23 @ 02:08)  Source: Clean Catch Clean Catch (Midstream)  Final Report (08-07-23 @ 18:10):    >100,000 CFU/ml Escherichia coli ESBL  Organism: Escherichia coli ESBL (08-07-23 @ 18:10)  Organism: Escherichia coli ESBL (08-07-23 @ 18:10)      Method Type: ZANE      -  Amikacin: S <=16      -  Amoxicillin/Clavulanic Acid: S <=8/4      -  Ampicillin: R >16 These ampicillin results predict results for amoxicillin      -  Ampicillin/Sulbactam: S <=4/2 Enterobacter, Klebsiella aerogenes, Citrobacter, and Serratia may develop resistance during prolonged therapy (3-4 days)      -  Aztreonam: R >16      -  Cefazolin: R >16 For uncomplicated UTI with K. pneumoniae, E. coli, or P. mirablis: ZANE <=16 is sensitive and ZANE >=32 is resistant. This also predicts results for oral agents cefaclor, cefdinir, cefpodoxime, cefprozil, cefuroxime axetil, cephalexin and locarbef for uncomplicated UTI. Note that some isolates may be susceptible to these agents while testing resistant to cefazolin.      -  Cefepime: R 4      -  Ceftriaxone: R >32 Enterobacter, Klebsiella aerogenes, Citrobacter, and Serratia may develop resistance during prolonged therapy      -  Cefuroxime: R >16      -  Ciprofloxacin: R >2      -  Ertapenem: S <=0.5      -  Gentamicin: S <=2      -  Imipenem: S <=1      -  Levofloxacin: R >4      -  Meropenem: S <=1      -  Nitrofurantoin: S <=32 Should not be used to treat pyelonephritis      -  Piperacillin/Tazobactam: S <=8      -  Tobramycin: S <=2      -  Trimethoprim/Sulfamethoxazole: R >2/38    Culture - Blood (collected 08-04-23 @ 01:05)  Source: .Blood Blood-Peripheral  Final Report (08-09-23 @ 09:01):    No growth at 5 days    Culture - Blood (collected 08-04-23 @ 01:05)  Source: .Blood Blood-Peripheral  Final Report (08-09-23 @ 09:01):    No growth at 5 days        Lactate, Blood: 1.5 mmol/L (08-14-23 @ 05:22)  Lactate, Blood: 1.4 mmol/L (08-12-23 @ 21:05)  Lactate, Blood: 4.4 mmol/L (08-12-23 @ 11:25)  Lactate, Blood: 2.8 mmol/L (08-12-23 @ 02:19)  Blood Gas Venous - Lactate: 2.90 mmol/L (08-12-23 @ 00:56)      INFECTIOUS DISEASES TESTING  Procalcitonin, Serum: 9.78 (08-12-23 @ 11:25)  MRSA PCR Result.: Negative (08-12-23 @ 06:10)  Rapid RVP Result: NotDetec (08-12-23 @ 01:33)  Procalcitonin, Serum: 0.63 (08-10-23 @ 08:46)  Procalcitonin, Serum: 7.82 (08-04-23 @ 16:13)  MRSA PCR Result.: Negative (08-04-23 @ 14:52)  Rapid RVP Result: NotDetec (08-04-23 @ 03:00)      INFLAMMATORY MARKERS      RADIOLOGY & ADDITIONAL TESTS:  I have personally reviewed the last available Chest xray  CXR  Xray Chest 1 View- PORTABLE-Urgent:   ACC: [id number] EXAM:  XR CHEST PORTABLE URGENT 1V   ORDERED BY: CEZAR WHITE     PROCEDURE DATE:  08/14/2023          INTERPRETATION:  Clinical History / Reason for exam: Infection    Comparison : Chest radiograph 2 days prior.    Technique/Positioning: AP frontal. Low lung volumes. Telemetry leads   obscuring right costophrenic angle.    Findings:    Support devices: Telemetry leads.    Cardiac/mediastinum/hilum: Unchanged.    Lung parenchyma/Pleura: Left opacity/effusion. No pneumothorax.    Skeleton/soft tissues: Unchanged.    Impression:    Left opacity/effusion.    --- End of Report ---          NADJA GUERRA MD; Resident Radiologist  This document has been electronically signed.  FAYE JOHNSON MD; Attending Interventional Radiologist  This document has been electronically signed. Aug 14 2023  3:20PM (08-14-23 @ 08:56)      CT      CARDIOLOGY TESTING  12 Lead ECG:   Ventricular Rate 114 BPM    Atrial Rate 114 BPM    P-R Interval 116 ms    QRS Duration 64 ms    Q-T Interval 314 ms    QTC Calculation(Bazett) 432 ms    P Axis 36 degrees    R Axis 66 degrees    T Axis 42 degrees    Diagnosis Line Sinus tachycardia  Otherwise normal ECG    Confirmed by Justice Stockton ([id number]) on 8/12/2023 10:23:41 AM (08-12-23 @ 01:08)  12 Lead ECG:   Ventricular Rate 105 BPM    Atrial Rate 105 BPM    P-R Interval 132 ms    QRS Duration 64 ms    Q-T Interval 330 ms    QTC Calculation(Bazett) 436 ms    P Axis 46 degrees    R Axis 36 degrees    T Axis 34 degrees    Diagnosis Line Sinus tachycardia  Otherwise normal ECG    Confirmed by MARJAN MENDES MD ([id number]) on 8/9/2023 7:34:13 AM (08-08-23 @ 22:06)      MEDICATIONS  chlorhexidine 2% Cloths 1  enoxaparin Injectable 40  gabapentin 600  melatonin 5  meropenem  IVPB 1000  midodrine 5  multivitamin 1  pantoprazole  Injectable 40  raloxifene 60      WEIGHT  Weight (kg): 52.1 (08-15-23 @ 00:00)      ANTIBIOTICS:  meropenem  IVPB 1000 milliGRAM(s) IV Intermittent every 8 hours      All available historical records have been reviewed

## 2023-08-15 NOTE — PROGRESS NOTE ADULT - SUBJECTIVE AND OBJECTIVE BOX
TEA ECHAVARRIA  58y Female    CHIEF COMPLAINT:    Patient is a 58y old  Female who presents with a chief complaint of sepsis (14 Aug 2023 07:43)    INTERVAL HPI/OVERNIGHT EVENTS:    Patient seen and examined. No acute events overnight. comfortable on room air     ROS: All other systems are negative.    Vital Signs:    T(F): 97.3 (08-15-23 @ 07:21), Max: 97.3 (08-15-23 @ 07:21)  HR: 99 (08-15-23 @ 07:21) (65 - 99)  BP: 100/55 (08-15-23 @ 07:21) (86/44 - 107/60)  RR: 18 (08-15-23 @ 07:21) (18 - 20)  SpO2: 93% (08-15-23 @ 07:21) (93% - 97%)    14 Aug 2023 07:01  -  15 Aug 2023 07:00  --------------------------------------------------------  IN: 0 mL / OUT: 900 mL / NET: -900 mL    PHYSICAL EXAM:    GENERAL:  NAD  SKIN: No rashes or lesions  HEENT: Atraumatic. Normocephalic.  .  NECK: Supple, No JVD.    PULMONARY: decreased breath sounds B/L. No wheezing.   CVS: Normal S1, S2. Rate and Rhythm are regular.   ABDOMEN/GI: Soft, Nontender, Nondistended   MSK:  No clubbing or cyanosis. Contracted LE  NEUROLOGIC: does not follow commands   PSYCH: Awake and nonverbal     Consultant(s) Notes Reviewed:  [x ] YES  [ ] NO  Care Discussed with Consultants/Other Providers [ x] YES  [ ] NO    LABS:                        9.0    11.35 )-----------( 421      ( 14 Aug 2023 05:22 )             29.0     138  |  101  |  17  ----------------------------<  100<H>  3.8   |  29  |  0.8    Ca    8.5      14 Aug 2023 05:22  Mg     2.1     08-14    TPro  6.1  /  Alb  3.1<L>  /  TBili  <0.2  /  DBili  x   /  AST  24  /  ALT  20  /  AlkPhos  72  08-14    RADIOLOGY & ADDITIONAL TESTS  Imaging or report Personally Reviewed:  [x] YES  [ ] NO  EKG reviewed: [x] YES  [ ] NO    Medications:  Standing  chlorhexidine 2% Cloths 1 Application(s) Topical <User Schedule>  enoxaparin Injectable 40 milliGRAM(s) SubCutaneous every 24 hours  gabapentin 600 milliGRAM(s) Oral daily  melatonin 5 milliGRAM(s) Oral at bedtime  meropenem  IVPB 1000 milliGRAM(s) IV Intermittent every 8 hours  midodrine 5 milliGRAM(s) Oral every 8 hours  multivitamin 1 Tablet(s) Oral daily  pantoprazole  Injectable 40 milliGRAM(s) IV Push daily  raloxifene 60 milliGRAM(s) Oral daily    PRN Meds  acetaminophen     Tablet .. 650 milliGRAM(s) Oral every 6 hours PRN

## 2023-08-15 NOTE — PROGRESS NOTE ADULT - ASSESSMENT
58 year old Female with a PMHx of Down syndrome (nonverbal at baseline) hypothyroidism, cerebral palsy, congenital pulmonary stenosis brought in by EMS for SOB. Pt was discharged one day prior to admission for UTI/PNA sepsis w/ EBSL on meropenem.     #Sepsis POA with septic shock, now off pressors  #Acute cystitis  #Acute hypoxic respiratory failure 2/2 multifocal PNA   #Lactic acidosis, resolved  -CT A/P showed urinary bladder wall thickening, compatible with urinary bladder cystitis. No hydronephrosis or renal calculus. Increased right lower lobe, decreased eft lower lobe airspace opacities, compatible with pneumonia in the appropriate clinical setting.   -UCx 8/4 (last admission) with ESBL E. Coli  -UCx 8/12 (this admission) NGTD  -BCx NGTD   -ID following - meropenem IVPB 1000mg q8h for 7 days end 8/18. If d/c prior midline x iv ertapenem 1g q24h   -s/p pressors, BPs are soft - if SBP consistently <90, midodrine 5 q8   -f/u am CXR     #Down syndrome  #Congenital pulmonary stenosis  -echo (8/7):    1. Suboptimal and very limited study.   2. Left ventricular ejection fraction, by visual estimation, is >55%.   3. Normal global left ventricular systolic function.   4. The left ventricular diastolic function could not be assessed in this study.    #Osteoporosis  -c/w raloxifene     #Peripheral Neuropathy   -c/w gabapentin    #Hypothyroidism  -TSH 1.63    #DVT ppx: Lovenox  #GI pp: protonix  #Diet: Pureed thick liquids    pending: IV abx, f/u CXR  58 year old Female with a PMHx of Down syndrome (nonverbal at baseline) hypothyroidism, cerebral palsy, congenital pulmonary stenosis brought in by EMS for SOB. Pt was discharged one day prior to admission for UTI/PNA sepsis w/ EBSL on meropenem.     #Sepsis POA with septic shock, now off pressors  #Acute cystitis  #Acute hypoxic respiratory failure 2/2 multifocal PNA   #Lactic acidosis, resolved  -CT A/P showed urinary bladder wall thickening, compatible with urinary bladder cystitis. No hydronephrosis or renal calculus. Increased right lower lobe, decreased eft lower lobe airspace opacities, compatible with pneumonia in the appropriate clinical setting.   -UCx 8/4 (last admission) with ESBL E. Coli  -UCx 8/12 (this admission) NGTD  -BCx NGTD   -ID following - meropenem IVPB 1000mg q8h for 7 days end 8/18. If d/c prior midline x iv ertapenem 1g q24h   -s/p pressors, now on midodrine 5 q8   -f/u am CXR     #Down syndrome  #Congenital pulmonary stenosis  -echo (8/7):    1. Suboptimal and very limited study.   2. Left ventricular ejection fraction, by visual estimation, is >55%.   3. Normal global left ventricular systolic function.   4. The left ventricular diastolic function could not be assessed in this study.    #Osteoporosis  -c/w raloxifene     #Peripheral Neuropathy   -c/w gabapentin    #Hypothyroidism  -TSH 1.63    #DVT ppx: Lovenox  #GI pp: protonix  #Diet: Pureed thick liquids    pending: IV abx, f/u CXR

## 2023-08-15 NOTE — PROGRESS NOTE ADULT - SUBJECTIVE AND OBJECTIVE BOX
24H events:    Patient is a 58y old Female who presents with a chief complaint of sepsis (14 Aug 2023 07:43)    Primary diagnosis of Sepsis      Day 1:  Day 2:  Day 3:     Today is hospital day 3d. This morning patient was seen and examined at bedside, resting comfortably in bed.  Pt is nonverbal at baseline, aide at bedside.   No acute or major events overnight.    Code Status:    Family communication:  Contact date:  Name of person contacted:  Relationship to patient:  Communication details:  What matters most:    PAST MEDICAL & SURGICAL HISTORY  Down syndrome    Osteoporosis    Mild anemia    Neuropathy    S/P debridement  of R hip on 3/2/21      SOCIAL HISTORY:  Social History:      ALLERGIES:  No Known Allergies    MEDICATIONS:  STANDING MEDICATIONS  chlorhexidine 2% Cloths 1 Application(s) Topical <User Schedule>  enoxaparin Injectable 40 milliGRAM(s) SubCutaneous every 24 hours  gabapentin 600 milliGRAM(s) Oral daily  melatonin 5 milliGRAM(s) Oral at bedtime  meropenem  IVPB 1000 milliGRAM(s) IV Intermittent every 8 hours  midodrine 5 milliGRAM(s) Oral every 8 hours  multivitamin 1 Tablet(s) Oral daily  pantoprazole  Injectable 40 milliGRAM(s) IV Push daily  raloxifene 60 milliGRAM(s) Oral daily    PRN MEDICATIONS  acetaminophen     Tablet .. 650 milliGRAM(s) Oral every 6 hours PRN    VITALS:   T(F): 97.3  HR: 99  BP: 100/55  RR: 18  SpO2: 93%    PHYSICAL EXAM:  GENERAL:   (x  ) NAD, lying in bed comfortably     (  ) obtunded     (  ) lethargic     (  ) somnolent    HEAD:   ( x ) Atraumatic     (  ) hematoma     (  ) laceration (specify location:       )     NECK:  (x  ) Supple     (  ) neck stiffness     (  ) nuchal rigidity     (  )  no JVD     (  ) JVD present ( -- cm)    HEART:  Rate -->     ( x ) normal rate     (  ) bradycardic     (  ) tachycardic  Rhythm -->     (x  ) regular     (  ) regularly irregular     (  ) irregularly irregular  Murmurs -->     ( x ) normal s1s2     (x  ) systolic murmur     (  ) diastolic murmur     (  ) continuous murmur      (  ) S3 present     (  ) S4 present    LUNGS:   ( x )Unlabored respirations     (  ) tachypnea  (  ) B/L air entry     ( x ) decreased breath sounds in throughout  (location     )    (x  ) no adventitious sound     (  ) crackles     (  ) wheezing      (  ) rhonchi      (specify location:       )  (  ) chest wall tenderness (specify location:       )    ABDOMEN:   (x  ) Soft     (  ) tense   |   ( x ) nondistended     (  ) distended   |   (  ) +BS     (  ) hypoactive bowel sounds     (  ) hyperactive bowel sounds  (x  ) nontender     (  ) RUQ tenderness     (  ) RLQ tenderness     (  ) LLQ tenderness     (  ) epigastric tenderness     (  ) diffuse tenderness  (  ) Splenomegaly      (  ) Hepatomegaly      (  ) Jaundice     (  ) ecchymosis     EXTREMITIES:  ( x ) Normal     (  ) Rash     (  ) ecchymosis     (  ) varicose veins      (  ) pitting edema     (  ) non-pitting edema   (  ) ulceration     (  ) gangrene:     (location:     )    NERVOUS SYSTEM:  Pt nonverbal at baseline, unable to follow commands, cannot assess orientation  (  ) A&Ox3     (  ) confused     (  ) lethargic  CN II-XII:     (  ) Intact     (  ) deficits found     (Specify:     )   Upper extremities:     (  ) no sensorimotor deficits     (  ) weakness     (  ) loss of proprioception/vibration     (  ) loss of touch/temperature (specify:    )  Lower extremities:     (  ) no sensorimotor deficits     (  ) weakness     (  ) loss of proprioception/vibration     (  ) loss of touch/temperature (specify:    )    SKIN:   (x  ) No rashes or lesions     (  ) maculopapular rash     (  ) pustules     (  ) vesicles     (  ) ulcer     (  ) ecchymosis     (specify location:     )    AMPAC score:    (  ) Indwelling Madsen Catheter:   Date insterted:    Reason (  ) Critical illness     (  ) urinary retention    (  ) Accurate Ins/Outs Monitoring     (  ) CMO patient    (  ) Central Line:   Date inserted:  Location: (  ) Right IJ     (  ) Left IJ     (  ) Right Fem     (  ) Left Fem    (  ) SPC        (  ) pigtail       (  ) PEG tube       (  ) colostomy       (  ) jejunostomy  (  ) U-Dall    LABS:                        9.0    6.43  )-----------( 428      ( 15 Aug 2023 12:15 )             28.3     08-15    140  |  104  |  15  ----------------------------<  128<H>  4.1   |  28  |  0.7    Ca    8.4      15 Aug 2023 12:15  Mg     2.1     08-14    TPro  6.1  /  Alb  3.1<L>  /  TBili  <0.2  /  DBili  x   /  AST  24  /  ALT  20  /  AlkPhos  72  08-14      Urinalysis Basic - ( 15 Aug 2023 12:15 )    Color: x / Appearance: x / SG: x / pH: x  Gluc: 128 mg/dL / Ketone: x  / Bili: x / Urobili: x   Blood: x / Protein: x / Nitrite: x   Leuk Esterase: x / RBC: x / WBC x   Sq Epi: x / Non Sq Epi: x / Bacteria: x                RADIOLOGY:

## 2023-08-16 DIAGNOSIS — Q90.9 DOWN SYNDROME, UNSPECIFIED: ICD-10-CM

## 2023-08-16 DIAGNOSIS — R65.21 SEVERE SEPSIS WITH SEPTIC SHOCK: ICD-10-CM

## 2023-08-16 DIAGNOSIS — E87.20 ACIDOSIS, UNSPECIFIED: ICD-10-CM

## 2023-08-16 DIAGNOSIS — K92.0 HEMATEMESIS: ICD-10-CM

## 2023-08-16 DIAGNOSIS — G80.9 CEREBRAL PALSY, UNSPECIFIED: ICD-10-CM

## 2023-08-16 DIAGNOSIS — J69.0 PNEUMONITIS DUE TO INHALATION OF FOOD AND VOMIT: ICD-10-CM

## 2023-08-16 DIAGNOSIS — G62.9 POLYNEUROPATHY, UNSPECIFIED: ICD-10-CM

## 2023-08-16 DIAGNOSIS — J18.9 PNEUMONIA, UNSPECIFIED ORGANISM: ICD-10-CM

## 2023-08-16 DIAGNOSIS — B96.20 UNSPECIFIED ESCHERICHIA COLI [E. COLI] AS THE CAUSE OF DISEASES CLASSIFIED ELSEWHERE: ICD-10-CM

## 2023-08-16 DIAGNOSIS — K76.0 FATTY (CHANGE OF) LIVER, NOT ELSEWHERE CLASSIFIED: ICD-10-CM

## 2023-08-16 DIAGNOSIS — A41.9 SEPSIS, UNSPECIFIED ORGANISM: ICD-10-CM

## 2023-08-16 DIAGNOSIS — E03.9 HYPOTHYROIDISM, UNSPECIFIED: ICD-10-CM

## 2023-08-16 DIAGNOSIS — Q22.1 CONGENITAL PULMONARY VALVE STENOSIS: ICD-10-CM

## 2023-08-16 DIAGNOSIS — N30.00 ACUTE CYSTITIS WITHOUT HEMATURIA: ICD-10-CM

## 2023-08-16 DIAGNOSIS — M81.0 AGE-RELATED OSTEOPOROSIS WITHOUT CURRENT PATHOLOGICAL FRACTURE: ICD-10-CM

## 2023-08-16 LAB
ANION GAP SERPL CALC-SCNC: 8 MMOL/L — SIGNIFICANT CHANGE UP (ref 7–14)
BASOPHILS # BLD AUTO: 0.05 K/UL — SIGNIFICANT CHANGE UP (ref 0–0.2)
BASOPHILS NFR BLD AUTO: 0.9 % — SIGNIFICANT CHANGE UP (ref 0–1)
BUN SERPL-MCNC: 16 MG/DL — SIGNIFICANT CHANGE UP (ref 10–20)
CALCIUM SERPL-MCNC: 8.6 MG/DL — SIGNIFICANT CHANGE UP (ref 8.4–10.4)
CHLORIDE SERPL-SCNC: 103 MMOL/L — SIGNIFICANT CHANGE UP (ref 98–110)
CO2 SERPL-SCNC: 29 MMOL/L — SIGNIFICANT CHANGE UP (ref 17–32)
CREAT SERPL-MCNC: 0.8 MG/DL — SIGNIFICANT CHANGE UP (ref 0.7–1.5)
EGFR: 86 ML/MIN/1.73M2 — SIGNIFICANT CHANGE UP
EOSINOPHIL # BLD AUTO: 0.24 K/UL — SIGNIFICANT CHANGE UP (ref 0–0.7)
EOSINOPHIL NFR BLD AUTO: 4.5 % — SIGNIFICANT CHANGE UP (ref 0–8)
GLUCOSE SERPL-MCNC: 97 MG/DL — SIGNIFICANT CHANGE UP (ref 70–99)
HCT VFR BLD CALC: 29.6 % — LOW (ref 37–47)
HGB BLD-MCNC: 9.1 G/DL — LOW (ref 12–16)
IMM GRANULOCYTES NFR BLD AUTO: 0.2 % — SIGNIFICANT CHANGE UP (ref 0.1–0.3)
LYMPHOCYTES # BLD AUTO: 1.61 K/UL — SIGNIFICANT CHANGE UP (ref 1.2–3.4)
LYMPHOCYTES # BLD AUTO: 30 % — SIGNIFICANT CHANGE UP (ref 20.5–51.1)
MAGNESIUM SERPL-MCNC: 2.3 MG/DL — SIGNIFICANT CHANGE UP (ref 1.8–2.4)
MCHC RBC-ENTMCNC: 24.3 PG — LOW (ref 27–31)
MCHC RBC-ENTMCNC: 30.7 G/DL — LOW (ref 32–37)
MCV RBC AUTO: 79.1 FL — LOW (ref 81–99)
MONOCYTES # BLD AUTO: 0.37 K/UL — SIGNIFICANT CHANGE UP (ref 0.1–0.6)
MONOCYTES NFR BLD AUTO: 6.9 % — SIGNIFICANT CHANGE UP (ref 1.7–9.3)
NEUTROPHILS # BLD AUTO: 3.08 K/UL — SIGNIFICANT CHANGE UP (ref 1.4–6.5)
NEUTROPHILS NFR BLD AUTO: 57.5 % — SIGNIFICANT CHANGE UP (ref 42.2–75.2)
NRBC # BLD: 0 /100 WBCS — SIGNIFICANT CHANGE UP (ref 0–0)
PLATELET # BLD AUTO: 451 K/UL — HIGH (ref 130–400)
PMV BLD: 9.6 FL — SIGNIFICANT CHANGE UP (ref 7.4–10.4)
POTASSIUM SERPL-MCNC: 4.5 MMOL/L — SIGNIFICANT CHANGE UP (ref 3.5–5)
POTASSIUM SERPL-SCNC: 4.5 MMOL/L — SIGNIFICANT CHANGE UP (ref 3.5–5)
RBC # BLD: 3.74 M/UL — LOW (ref 4.2–5.4)
RBC # FLD: 15.9 % — HIGH (ref 11.5–14.5)
SODIUM SERPL-SCNC: 140 MMOL/L — SIGNIFICANT CHANGE UP (ref 135–146)
WBC # BLD: 5.36 K/UL — SIGNIFICANT CHANGE UP (ref 4.8–10.8)
WBC # FLD AUTO: 5.36 K/UL — SIGNIFICANT CHANGE UP (ref 4.8–10.8)

## 2023-08-16 PROCEDURE — 71046 X-RAY EXAM CHEST 2 VIEWS: CPT | Mod: 26

## 2023-08-16 PROCEDURE — 99232 SBSQ HOSP IP/OBS MODERATE 35: CPT

## 2023-08-16 RX ADMIN — RALOXIFENE HYDROCHLORIDE 60 MILLIGRAM(S): 60 TABLET, COATED ORAL at 11:26

## 2023-08-16 RX ADMIN — MIDODRINE HYDROCHLORIDE 5 MILLIGRAM(S): 2.5 TABLET ORAL at 15:01

## 2023-08-16 RX ADMIN — CHLORHEXIDINE GLUCONATE 1 APPLICATION(S): 213 SOLUTION TOPICAL at 06:25

## 2023-08-16 RX ADMIN — MEROPENEM 100 MILLIGRAM(S): 1 INJECTION INTRAVENOUS at 15:01

## 2023-08-16 RX ADMIN — MEROPENEM 100 MILLIGRAM(S): 1 INJECTION INTRAVENOUS at 06:09

## 2023-08-16 RX ADMIN — Medication 1 TABLET(S): at 11:27

## 2023-08-16 RX ADMIN — Medication 5 MILLIGRAM(S): at 21:30

## 2023-08-16 RX ADMIN — MIDODRINE HYDROCHLORIDE 5 MILLIGRAM(S): 2.5 TABLET ORAL at 06:09

## 2023-08-16 RX ADMIN — MEROPENEM 100 MILLIGRAM(S): 1 INJECTION INTRAVENOUS at 21:30

## 2023-08-16 RX ADMIN — PANTOPRAZOLE SODIUM 40 MILLIGRAM(S): 20 TABLET, DELAYED RELEASE ORAL at 11:27

## 2023-08-16 RX ADMIN — GABAPENTIN 600 MILLIGRAM(S): 400 CAPSULE ORAL at 11:26

## 2023-08-16 RX ADMIN — ENOXAPARIN SODIUM 40 MILLIGRAM(S): 100 INJECTION SUBCUTANEOUS at 08:02

## 2023-08-16 RX ADMIN — MIDODRINE HYDROCHLORIDE 5 MILLIGRAM(S): 2.5 TABLET ORAL at 21:30

## 2023-08-16 NOTE — PROGRESS NOTE ADULT - ASSESSMENT
58 year old Female with a PMHx of Down syndrome (nonverbal at baseline) hypothyroidism, cerebral palsy, congenital pulmonary stenosis brought in by EMS for SOB. Pt was discharged one day prior to admission for UTI/PNA sepsis w/ EBSL treated with meropenem.     #Sepsis POA with septic shock, now off pressors  #Acute cystitis  #Acute hypoxic respiratory failure 2/2 multifocal PNA   #Lactic acidosis, resolved  -CT A/P showed urinary bladder wall thickening, compatible with urinary bladder cystitis. No hydronephrosis or renal calculus. Increased right lower lobe, decreased eft lower lobe airspace opacities, compatible with pneumonia in the appropriate clinical setting.   -8/4 (last admission) UCx with ESBL E. Coli, BCx negative  -8/12 (this admission) UCx and BCx NTD   -ID following - meropenem IVPB 1000mg q8h for 7 days end 8/18. If d/c prior midline x iv ertapenem 1g q24h   -s/p pressors, now on midodrine 5 q8   -repeat CXR: Left opacity/effusion.    #Down syndrome  #Congenital pulmonary stenosis  -echo (8/7):    1. Suboptimal and very limited study.   2. Left ventricular ejection fraction, by visual estimation, is >55%.   3. Normal global left ventricular systolic function.   4. The left ventricular diastolic function could not be assessed in this study.    #Osteoporosis  -c/w raloxifene     #Peripheral Neuropathy   -c/w gabapentin    #Hypothyroidism  -TSH 1.63    #DVT ppx: Lovenox  #GI pp: protonix  #Diet: Pureed thick liquids    pending: IV abx till 8/18

## 2023-08-16 NOTE — SWALLOW BEDSIDE ASSESSMENT ADULT - NS ASR SWALLOW FINDINGS DISCUS
Rosie Monson at bedside, DANIEL Lepe, Dr. Fofana via teams/Physician/Nursing/Care Coordinator
Physician/Nursing/Patient

## 2023-08-16 NOTE — PROGRESS NOTE ADULT - SUBJECTIVE AND OBJECTIVE BOX
24H events:    Patient is a 58y old Female who presents with a chief complaint of sepsis (14 Aug 2023 07:43)    Primary diagnosis of Sepsis      Day 1:  Day 2:  Day 3:     Today is hospital day 4d. This morning patient was seen and examined at bedside, resting comfortably in bed.  Pt is nonverbal at baseline.   No acute or major events overnight.    Code Status:    Family communication:  Contact date:  Name of person contacted:  Relationship to patient:  Communication details:  What matters most:    PAST MEDICAL & SURGICAL HISTORY  Down syndrome    Osteoporosis    Mild anemia    Neuropathy    S/P debridement  of R hip on 3/2/21      SOCIAL HISTORY:  Social History:      ALLERGIES:  No Known Allergies    MEDICATIONS:  STANDING MEDICATIONS  chlorhexidine 2% Cloths 1 Application(s) Topical <User Schedule>  enoxaparin Injectable 40 milliGRAM(s) SubCutaneous every 24 hours  gabapentin 600 milliGRAM(s) Oral daily  melatonin 5 milliGRAM(s) Oral at bedtime  meropenem  IVPB 1000 milliGRAM(s) IV Intermittent every 8 hours  midodrine 5 milliGRAM(s) Oral every 8 hours  multivitamin 1 Tablet(s) Oral daily  pantoprazole  Injectable 40 milliGRAM(s) IV Push daily  raloxifene 60 milliGRAM(s) Oral daily    PRN MEDICATIONS  acetaminophen     Tablet .. 650 milliGRAM(s) Oral every 6 hours PRN    VITALS:   T(F): 98  HR: 75  BP: 107/55  RR: 18  SpO2: 94%    PHYSICAL EXAM:  GENERAL:   ( x ) NAD, lying in bed comfortably     (  ) obtunded     (  ) lethargic     (  ) somnolent    HEAD:   ( x ) Atraumatic     (  ) hematoma     (  ) laceration (specify location:       )     NECK:  ( x ) Supple     (  ) neck stiffness     (  ) nuchal rigidity     (  )  no JVD     (  ) JVD present ( -- cm)    HEART:  Rate -->     ( x ) normal rate     (  ) bradycardic     (  ) tachycardic  Rhythm -->     ( x ) regular     (  ) regularly irregular     (  ) irregularly irregular  Murmurs -->     (  ) normal s1s2     (x  ) systolic murmur     (  ) diastolic murmur     (  ) continuous murmur      (  ) S3 present     (  ) S4 present    LUNGS:   (x  )Unlabored respirations     (  ) tachypnea  ( x ) B/L air entry     (  ) decreased breath sounds in:  (location     )    (x  ) no adventitious sound     (  ) crackles     (  ) wheezing      (  ) rhonchi      (specify location:       )  (  ) chest wall tenderness (specify location:       )    ABDOMEN:   ( x ) Soft     (  ) tense   |   ( x ) nondistended     (  ) distended   |   (  ) +BS     (  ) hypoactive bowel sounds     (  ) hyperactive bowel sounds  ( x ) nontender     (  ) RUQ tenderness     (  ) RLQ tenderness     (  ) LLQ tenderness     (  ) epigastric tenderness     (  ) diffuse tenderness  (  ) Splenomegaly      (  ) Hepatomegaly      (  ) Jaundice     (  ) ecchymosis     EXTREMITIES: contracted LE   ( x ) Normal     (  ) Rash     (  ) ecchymosis     (  ) varicose veins      (  ) pitting edema     (  ) non-pitting edema   (  ) ulceration     (  ) gangrene:     (location:     )    NERVOUS SYSTEM:  pt nonverbal at baseline, does not follow commmands  (  ) A&Ox3     (  ) confused     (  ) lethargic  CN II-XII:     ( ) Intact     (  ) deficits found     (Specify:     )   Upper extremities:     ( ) no sensorimotor deficits     (  ) weakness     (  ) loss of proprioception/vibration     (  ) loss of touch/temperature (specify:    )  Lower extremities:     (  ) no sensorimotor deficits     (  ) weakness     (  ) loss of proprioception/vibration     (  ) loss of touch/temperature (specify:    )    SKIN:   (  x) No rashes or lesions     (  ) maculopapular rash     (  ) pustules     (  ) vesicles     (  ) ulcer     (  ) ecchymosis     (specify location:     )    AMPAC score:    (  ) Indwelling Madsen Catheter:   Date insterted:    Reason (  ) Critical illness     (  ) urinary retention    (  ) Accurate Ins/Outs Monitoring     (  ) CMO patient    (  ) Central Line:   Date inserted:  Location: (  ) Right IJ     (  ) Left IJ     (  ) Right Fem     (  ) Left Fem    (  ) SPC        (  ) pigtail       (  ) PEG tube       (  ) colostomy       (  ) jejunostomy  (  ) U-Dall    LABS:                        9.1    5.36  )-----------( 451      ( 16 Aug 2023 08:09 )             29.6     08-16    140  |  103  |  16  ----------------------------<  97  4.5   |  29  |  0.8    Ca    8.6      16 Aug 2023 08:09  Mg     2.3     08-16        Urinalysis Basic - ( 16 Aug 2023 08:09 )    Color: x / Appearance: x / SG: x / pH: x  Gluc: 97 mg/dL / Ketone: x  / Bili: x / Urobili: x   Blood: x / Protein: x / Nitrite: x   Leuk Esterase: x / RBC: x / WBC x   Sq Epi: x / Non Sq Epi: x / Bacteria: x                RADIOLOGY:

## 2023-08-16 NOTE — ADVANCED PRACTICE NURSE CONSULT - ASSESSMENT
History of Present Illness:   Ms. Linton is a 58 year old Female with a PMHx of Down syndrome, nonverbal at baseline, hypothyroidism, cerebral palsy, congenital pulmonary stenosis brought in by EMS for SOB. Pt was discharged yesterday for UTI/PNA sepsis w/ EBSL on meropenem. Pt brought from NH for worsening SOB. As per EMS pt was saturating low to the 80s. She was placed on on 6L NC and her saturation went up to 96%. History was obtained from aide.     Allergies and Intolerances:        Allergies:  	No Known Allergies:     Home Medications:   * Patient Currently Takes Medications as of 11-Aug-2023 14:14 documented in Structured Notes  · 	pantoprazole 40 mg oral delayed release tablet: 1 tab(s) orally once a day  · 	Macrobid 100 mg oral capsule: 1 cap(s) orally 2 times a day  · 	Multiple Vitamins oral tablet: 1 tab(s) orally once a day  · 	midodrine 10 mg oral tablet: 1 tab(s) orally 3 times a day  · 	melatonin 5 mg oral tablet: 1 tab(s) orally once a day (at bedtime)  · 	gabapentin 600 mg oral tablet: 1 orally once a day  · 	raloxifene 60 mg oral tablet: 1 tab(s) orally once a day    Patient History:    Past Medical, Past Surgical, and Family History:  PAST MEDICAL HISTORY:  Down syndrome   Mild anemia   Neuropathy   Osteoporosis.   PAST SURGICAL HISTORY:  S/P debridement of R hip on 3/2/21.   FAMILY HISTORY:  No pertinent family history in first degree relatives. No pertinent family history of: irritable bowel syndrome.    Patient received lying in bed. Awake and alert. Limited mobility. Contracted. Incontinent of urine and stool. High risk for pressure injury.    Type of Wound: Multiple Deep Tissue Injuries  Location: Bilateral feet  Tunneling /Undermining: No  Wound bed: Blood filled blisters  Wound edges: Intact  Periwound: Intact  Wound exudate: None  Wound odor: No  Induration, erythema, warmth: No  Wound pain: No    Fissure noted to gluteal cleft. Pink. No exudate, no odor.

## 2023-08-16 NOTE — SWALLOW BEDSIDE ASSESSMENT ADULT - SWALLOW EVAL: DIAGNOSIS
Toleration of puree w/o overt s/s of penetration/aspiration. Overt s/s of penetration/aspiration for mildly thick and thin liquids. Throat clearing.
+immediate cough/throat-clear with thin liquids; improved tolerance of mildly thick via tsp, puree solids w/ o overt s/s of penetration/aspiration

## 2023-08-16 NOTE — SWALLOW BEDSIDE ASSESSMENT ADULT - COMMENTS
Pt known to SLP dept. from 4/2022 w/ VFSS recs for puree, thin liquids. No laryngeal penetration/aspiration during study.  Last SLP visit on 8/7/23: where patient tolerated a puree diet and thin liquids w/o overt s/s of penetration/aspiration
Pt known to [facility]LP dept. from 4/2022 w/ VFSS recs for puree, thin liquids. No laryngeal penetration/aspiration during study.  Last SLP visit on 8/7/23: where patient tolerated a puree diet and thin liquids w/o overt s/s of penetration/aspiration

## 2023-08-16 NOTE — PROGRESS NOTE ADULT - SUBJECTIVE AND OBJECTIVE BOX
SUBJECTIVE:    Patient is a 58y old Female who presents with a chief complaint of sepsis (14 Aug 2023 07:43)    Currently admitted to medicine with the primary diagnosis of Sepsis       Today is hospital day 4d.     PAST MEDICAL & SURGICAL HISTORY  Down syndrome    Osteoporosis    Mild anemia    Neuropathy    S/P debridement  of R hip on 3/2/21      ALLERGIES:  No Known Allergies    MEDICATIONS:  STANDING MEDICATIONS  chlorhexidine 2% Cloths 1 Application(s) Topical <User Schedule>  enoxaparin Injectable 40 milliGRAM(s) SubCutaneous every 24 hours  gabapentin 600 milliGRAM(s) Oral daily  melatonin 5 milliGRAM(s) Oral at bedtime  meropenem  IVPB 1000 milliGRAM(s) IV Intermittent every 8 hours  midodrine 5 milliGRAM(s) Oral every 8 hours  multivitamin 1 Tablet(s) Oral daily  pantoprazole  Injectable 40 milliGRAM(s) IV Push daily  raloxifene 60 milliGRAM(s) Oral daily    PRN MEDICATIONS  acetaminophen     Tablet .. 650 milliGRAM(s) Oral every 6 hours PRN    VITALS:   T(F): 96.6  HR: 106  BP: 141/58  RR: 18  SpO2: 94%    LABS:                        9.1    5.36  )-----------( 451      ( 16 Aug 2023 08:09 )             29.6     08-16    140  |  103  |  16  ----------------------------<  97  4.5   |  29  |  0.8    Ca    8.6      16 Aug 2023 08:09  Mg     2.3     08-16        Urinalysis Basic - ( 16 Aug 2023 08:09 )    Color: x / Appearance: x / SG: x / pH: x  Gluc: 97 mg/dL / Ketone: x  / Bili: x / Urobili: x   Blood: x / Protein: x / Nitrite: x   Leuk Esterase: x / RBC: x / WBC x   Sq Epi: x / Non Sq Epi: x / Bacteria: x                RADIOLOGY:    PHYSICAL EXAM:  GEN: No acute distress  LUNGS: Clear to auscultation bilaterally   HEART: S1/S2 present. RRR.   ABD/ GI: Soft, non-tender, non-distended. Bowel sounds present  EXT: contracted with wounds  NEURO: Awake and alert

## 2023-08-16 NOTE — ADVANCED PRACTICE NURSE CONSULT - RECOMMEDATIONS
Cleanse wounds to bilateral feet with soap and water  Pat dry, apply Cavilon, cover with Allevyn twice a day, prn for soiling    Cleanse fissure with soap and water  Pat dry, apply moisture barrier cream, leave open to air, twice a day, prn for soiling    Maintain pressure injury prevention.   Keep skin clean.   Maintain incontinence care.   Monitor wound for changes and notify provider   Case discussed with primary RN

## 2023-08-16 NOTE — PROGRESS NOTE ADULT - ASSESSMENT
58 year old Female with a PMHx of Down syndrome, nonverbal at baseline, hypothyroidism, cerebral palsy, congenital pulmonary stenosis brought in by EMS for SOB. Pt was discharged one day prior to admission for UTI/PNA sepsis w/ EBSL on meropenem.    Septic shock, POA -resolved, off levophed   Acute Cystitis on meropenem till 8/18 as per ID.  Multifocal PNA/Suspected Aspiration   Acute Hypoxic Respiratory Failure on venturi mask in the ED  Lactic Acidosis - resolved  CT A/P in the ED showed Diffuse urinary bladder wall thickening, compatible with urinary bladder cystitis, infection. No hydronephrosis or renal calculus. Increased right lower lobe, decreased left lower lobe airspace opacities, compatible with pneumonia in the appropriate clinical setting.   off Levophed  If hypotensive, trial of midodrine Q8H  - s/p venturi mask, now on room air   - Procal 10 on 8/12<--- 0.6 8/10  Ucx 08/04: ESBL E.Coli, Blood cx 8/12 - NGTD, nasal MRSA neg   - c/w meropenem for now, fu id   -- tolerating PO diet per s/s  -   Down syndrome  Congential P stenosis  TTE:  1. Suboptimal and very limited study.   2. Left ventricular ejection fraction, by visual estimation, is >55%.   3. Normal global left ventricular systolic function.   4. The left ventricular diastolic function could not be assessed in this   study.  - avoid fluid overload     Osteoporosis - Raloxifene     Peripheral Neuropathy - Gabapentin    - DVT Prophylaxis: Lovenox    OVerall prognosis is poor    Pending: labs, IV abx,  barium swallow in AM  discussed with aid at bedside

## 2023-08-17 LAB
ANION GAP SERPL CALC-SCNC: 8 MMOL/L — SIGNIFICANT CHANGE UP (ref 7–14)
BASOPHILS # BLD AUTO: 0.08 K/UL — SIGNIFICANT CHANGE UP (ref 0–0.2)
BASOPHILS NFR BLD AUTO: 1 % — SIGNIFICANT CHANGE UP (ref 0–1)
BUN SERPL-MCNC: 18 MG/DL — SIGNIFICANT CHANGE UP (ref 10–20)
CALCIUM SERPL-MCNC: 8.6 MG/DL — SIGNIFICANT CHANGE UP (ref 8.4–10.5)
CHLORIDE SERPL-SCNC: 101 MMOL/L — SIGNIFICANT CHANGE UP (ref 98–110)
CO2 SERPL-SCNC: 27 MMOL/L — SIGNIFICANT CHANGE UP (ref 17–32)
CREAT SERPL-MCNC: 0.7 MG/DL — SIGNIFICANT CHANGE UP (ref 0.7–1.5)
CULTURE RESULTS: SIGNIFICANT CHANGE UP
CULTURE RESULTS: SIGNIFICANT CHANGE UP
EGFR: 101 ML/MIN/1.73M2 — SIGNIFICANT CHANGE UP
EOSINOPHIL # BLD AUTO: 0.29 K/UL — SIGNIFICANT CHANGE UP (ref 0–0.7)
EOSINOPHIL NFR BLD AUTO: 3.7 % — SIGNIFICANT CHANGE UP (ref 0–8)
GLUCOSE SERPL-MCNC: 106 MG/DL — HIGH (ref 70–99)
HCT VFR BLD CALC: 31.4 % — LOW (ref 37–47)
HGB BLD-MCNC: 9.7 G/DL — LOW (ref 12–16)
IMM GRANULOCYTES NFR BLD AUTO: 0.4 % — HIGH (ref 0.1–0.3)
LYMPHOCYTES # BLD AUTO: 1.79 K/UL — SIGNIFICANT CHANGE UP (ref 1.2–3.4)
LYMPHOCYTES # BLD AUTO: 23 % — SIGNIFICANT CHANGE UP (ref 20.5–51.1)
MAGNESIUM SERPL-MCNC: 2.4 MG/DL — SIGNIFICANT CHANGE UP (ref 1.8–2.4)
MCHC RBC-ENTMCNC: 24.7 PG — LOW (ref 27–31)
MCHC RBC-ENTMCNC: 30.9 G/DL — LOW (ref 32–37)
MCV RBC AUTO: 79.9 FL — LOW (ref 81–99)
MONOCYTES # BLD AUTO: 0.54 K/UL — SIGNIFICANT CHANGE UP (ref 0.1–0.6)
MONOCYTES NFR BLD AUTO: 6.9 % — SIGNIFICANT CHANGE UP (ref 1.7–9.3)
NEUTROPHILS # BLD AUTO: 5.06 K/UL — SIGNIFICANT CHANGE UP (ref 1.4–6.5)
NEUTROPHILS NFR BLD AUTO: 65 % — SIGNIFICANT CHANGE UP (ref 42.2–75.2)
NRBC # BLD: 0 /100 WBCS — SIGNIFICANT CHANGE UP (ref 0–0)
PLATELET # BLD AUTO: 493 K/UL — HIGH (ref 130–400)
PMV BLD: 9.6 FL — SIGNIFICANT CHANGE UP (ref 7.4–10.4)
POTASSIUM SERPL-MCNC: 4.5 MMOL/L — SIGNIFICANT CHANGE UP (ref 3.5–5)
POTASSIUM SERPL-SCNC: 4.5 MMOL/L — SIGNIFICANT CHANGE UP (ref 3.5–5)
RBC # BLD: 3.93 M/UL — LOW (ref 4.2–5.4)
RBC # FLD: 15.8 % — HIGH (ref 11.5–14.5)
SODIUM SERPL-SCNC: 136 MMOL/L — SIGNIFICANT CHANGE UP (ref 135–146)
SPECIMEN SOURCE: SIGNIFICANT CHANGE UP
SPECIMEN SOURCE: SIGNIFICANT CHANGE UP
WBC # BLD: 7.79 K/UL — SIGNIFICANT CHANGE UP (ref 4.8–10.8)
WBC # FLD AUTO: 7.79 K/UL — SIGNIFICANT CHANGE UP (ref 4.8–10.8)

## 2023-08-17 PROCEDURE — 74230 X-RAY XM SWLNG FUNCJ C+: CPT | Mod: 26

## 2023-08-17 PROCEDURE — 99232 SBSQ HOSP IP/OBS MODERATE 35: CPT

## 2023-08-17 RX ORDER — MIDODRINE HYDROCHLORIDE 2.5 MG/1
1 TABLET ORAL
Qty: 30 | Refills: 0
Start: 2023-08-17 | End: 2023-08-26

## 2023-08-17 RX ORDER — ERTAPENEM SODIUM 1 G/1
1000 INJECTION, POWDER, LYOPHILIZED, FOR SOLUTION INTRAMUSCULAR; INTRAVENOUS ONCE
Refills: 0 | Status: COMPLETED | OUTPATIENT
Start: 2023-08-18 | End: 2023-08-18

## 2023-08-17 RX ADMIN — MIDODRINE HYDROCHLORIDE 5 MILLIGRAM(S): 2.5 TABLET ORAL at 20:59

## 2023-08-17 RX ADMIN — ENOXAPARIN SODIUM 40 MILLIGRAM(S): 100 INJECTION SUBCUTANEOUS at 11:54

## 2023-08-17 RX ADMIN — MEROPENEM 100 MILLIGRAM(S): 1 INJECTION INTRAVENOUS at 05:18

## 2023-08-17 RX ADMIN — PANTOPRAZOLE SODIUM 40 MILLIGRAM(S): 20 TABLET, DELAYED RELEASE ORAL at 11:53

## 2023-08-17 RX ADMIN — Medication 1 TABLET(S): at 11:53

## 2023-08-17 RX ADMIN — MIDODRINE HYDROCHLORIDE 5 MILLIGRAM(S): 2.5 TABLET ORAL at 14:29

## 2023-08-17 RX ADMIN — MEROPENEM 100 MILLIGRAM(S): 1 INJECTION INTRAVENOUS at 20:58

## 2023-08-17 RX ADMIN — MEROPENEM 100 MILLIGRAM(S): 1 INJECTION INTRAVENOUS at 14:16

## 2023-08-17 RX ADMIN — GABAPENTIN 600 MILLIGRAM(S): 400 CAPSULE ORAL at 11:53

## 2023-08-17 RX ADMIN — Medication 5 MILLIGRAM(S): at 21:02

## 2023-08-17 RX ADMIN — CHLORHEXIDINE GLUCONATE 1 APPLICATION(S): 213 SOLUTION TOPICAL at 05:18

## 2023-08-17 RX ADMIN — MIDODRINE HYDROCHLORIDE 5 MILLIGRAM(S): 2.5 TABLET ORAL at 05:18

## 2023-08-17 RX ADMIN — Medication 650 MILLIGRAM(S): at 21:03

## 2023-08-17 RX ADMIN — RALOXIFENE HYDROCHLORIDE 60 MILLIGRAM(S): 60 TABLET, COATED ORAL at 11:53

## 2023-08-17 NOTE — SWALLOW VFSS/MBS ASSESSMENT ADULT - ROSENBEK'S PENETRATION ASPIRATION SCALE
(2) w/ thin, (1) w/ mildly thick and puree/(2) contrast enters airway, remains above the vocal cords, no residue remains (penetration)

## 2023-08-17 NOTE — PROGRESS NOTE ADULT - ASSESSMENT
58 year old Female with a PMHx of Down syndrome, nonverbal at baseline, hypothyroidism, cerebral palsy, congenital pulmonary stenosis brought in by EMS for SOB. Pt was discharged one day prior to admission for UTI/PNA sepsis w/ EBSL on meropenem.    Septic shock, POA -resolved, off levophed   Acute Cystitis on meropenem till 8/18 as per ID.  Multifocal PNA/Suspected Aspiration   Acute Hypoxic Respiratory Failure on venturi mask in the ED  Lactic Acidosis - resolved  CT A/P in the ED showed Diffuse urinary bladder wall thickening, compatible with urinary bladder cystitis, infection. No hydronephrosis or renal calculus. Increased right lower lobe, decreased left lower lobe airspace opacities, compatible with pneumonia in the appropriate clinical setting.   off Levophed  If hypotensive, trial of midodrine Q8H  - s/p venturi mask, now on room air   - Procal 10 on 8/12<--- 0.6 8/10  Ucx 08/04: ESBL E.Coli, Blood cx 8/12 - NGTD, nasal MRSA neg   - c/w meropenem for now, fu id   -- tolerating PO diet per s/s  -   Down syndrome  Congential P stenosis  TTE:  1. Suboptimal and very limited study.   2. Left ventricular ejection fraction, by visual estimation, is >55%.   3. Normal global left ventricular systolic function.   4. The left ventricular diastolic function could not be assessed in this   study.  - avoid fluid overload     Osteoporosis - Raloxifene     Peripheral Neuropathy - Gabapentin    - DVT Prophylaxis: Lovenox    OVerall prognosis is poor     58 year old Female with a PMHx of Down syndrome, nonverbal at baseline, hypothyroidism, cerebral palsy, congenital pulmonary stenosis brought in by EMS for SOB. Pt was discharged one day prior to admission for UTI/PNA sepsis w/ EBSL on meropenem.    Septic shock, POA -resolved, off levophed   Acute Cystitis on meropenem till 8/18 as per ID.  Multifocal PNA/Suspected Aspiration   Acute Hypoxic Respiratory Failure on venturi mask in the ED  Lactic Acidosis - resolved  CT A/P in the ED showed Diffuse urinary bladder wall thickening, compatible with urinary bladder cystitis, infection. No hydronephrosis or renal calculus. Increased right lower lobe, decreased left lower lobe airspace opacities, compatible with pneumonia in the appropriate clinical setting.   off Levophed  If hypotensive, trial of midodrine Q8H  - s/p venturi mask, now on room air   - Procal 10 on 8/12<--- 0.6 8/10  Ucx 08/04: ESBL E.Coli, Blood cx 8/12 - NGTD, nasal MRSA neg   -  change meropenem to ertapenem  -- tolerating PO diet per s/s  -   Down syndrome  Congential P stenosis  TTE:  1. Suboptimal and very limited study.   2. Left ventricular ejection fraction, by visual estimation, is >55%.   3. Normal global left ventricular systolic function.   4. The left ventricular diastolic function could not be assessed in this   study.  - avoid fluid overload     Osteoporosis - Raloxifene     Peripheral Neuropathy - Gabapentin    - DVT Prophylaxis: Lovenox    OVerall prognosis is poor-- DC plan AM

## 2023-08-17 NOTE — PROGRESS NOTE ADULT - ASSESSMENT
58 year old Female with a PMHx of Down syndrome (nonverbal at baseline) hypothyroidism, cerebral palsy, congenital pulmonary stenosis brought in by EMS for SOB. Pt was discharged one day prior to admission for UTI/PNA sepsis w/ EBSL treated with meropenem.     #Sepsis POA with septic shock, now off pressors  #Acute cystitis  #Acute hypoxic respiratory failure 2/2 multifocal PNA   #Lactic acidosis, resolved  -CT A/P showed urinary bladder wall thickening, compatible with urinary bladder cystitis. No hydronephrosis or renal calculus. Increased right lower lobe, decreased eft lower lobe airspace opacities, compatible with pneumonia in the appropriate clinical setting.   -8/4 (last admission) UCx with ESBL E. Coli, BCx negative  -8/12 (this admission) UCx and BCx NTD   -ID following - meropenem IVPB 1000mg q8h for 7 days end 8/18. If d/c prior midline x iv ertapenem 1g q24h   -s/p pressors, now on midodrine 5 q8   -CXR 8/14 - left opacity/effusion  -repeat CXR  8/16 - No focal consolidation, pleural effusion, or pneumothorax.  -barium swallow today per S+S     #Down syndrome  #Congenital pulmonary stenosis  -echo (8/7):    1. Suboptimal and very limited study.   2. Left ventricular ejection fraction, by visual estimation, is >55%.   3. Normal global left ventricular systolic function.   4. The left ventricular diastolic function could not be assessed in this study.    #Osteoporosis  -c/w raloxifene     #Peripheral Neuropathy   -c/w gabapentin    #Hypothyroidism  -TSH 1.63    #DVT ppx: Lovenox  #GI pp: protonix  #Diet: Pureed thick liquids    pending: IV abx till 8/18, barium swallow today

## 2023-08-17 NOTE — PROGRESS NOTE ADULT - SUBJECTIVE AND OBJECTIVE BOX
24H events:    Patient is a 58y old Female who presents with a chief complaint of sepsis (14 Aug 2023 07:43)    Primary diagnosis of Sepsis      Day 1:  Day 2:  Day 3:     Today is hospital day 5d. This morning patient was seen and examined at bedside, resting comfortably in bed. Pt is nonverbal at baseline.   No acute or major events overnight.    Code Status:    Family communication:  Contact date:  Name of person contacted:  Relationship to patient:  Communication details:  What matters most:    PAST MEDICAL & SURGICAL HISTORY  Down syndrome    Osteoporosis    Mild anemia    Neuropathy    S/P debridement  of R hip on 3/2/21      SOCIAL HISTORY:  Social History:      ALLERGIES:  No Known Allergies    MEDICATIONS:  STANDING MEDICATIONS  chlorhexidine 2% Cloths 1 Application(s) Topical <User Schedule>  enoxaparin Injectable 40 milliGRAM(s) SubCutaneous every 24 hours  gabapentin 600 milliGRAM(s) Oral daily  melatonin 5 milliGRAM(s) Oral at bedtime  meropenem  IVPB 1000 milliGRAM(s) IV Intermittent every 8 hours  midodrine 5 milliGRAM(s) Oral every 8 hours  multivitamin 1 Tablet(s) Oral daily  pantoprazole  Injectable 40 milliGRAM(s) IV Push daily  raloxifene 60 milliGRAM(s) Oral daily    PRN MEDICATIONS  acetaminophen     Tablet .. 650 milliGRAM(s) Oral every 6 hours PRN    VITALS:   T(F): 97.5  HR: 71  BP: 107/55  RR: 18  SpO2: 94%    PHYSICAL EXAM:  GENERAL:   (x  ) NAD, lying in bed comfortably     (  ) obtunded     (  ) lethargic     (  ) somnolent    HEAD:   (x  ) Atraumatic     (  ) hematoma     (  ) laceration (specify location:       )     NECK:  ( x ) Supple     (  ) neck stiffness     (  ) nuchal rigidity     (  )  no JVD     (  ) JVD present ( -- cm)    HEART:  Rate -->     ( x ) normal rate     (  ) bradycardic     (  ) tachycardic  Rhythm -->     ( x ) regular     (  ) regularly irregular     (  ) irregularly irregular  Murmurs -->     (  ) normal s1s2     (x  ) systolic murmur     (  ) diastolic murmur     (  ) continuous murmur      (  ) S3 present     (  ) S4 present    LUNGS:   (x  )Unlabored respirations     (  ) tachypnea  ( x ) B/L air entry     (  ) decreased breath sounds in:  (location     )    (x  ) no adventitious sound     (  ) crackles     (  ) wheezing      (  ) rhonchi      (specify location:       )  (  ) chest wall tenderness (specify location:       )    ABDOMEN:   ( x ) Soft     (  ) tense   |   (x  ) nondistended     (  ) distended   |   (  ) +BS     (  ) hypoactive bowel sounds     (  ) hyperactive bowel sounds  (x  ) nontender     (  ) RUQ tenderness     (  ) RLQ tenderness     (  ) LLQ tenderness     (  ) epigastric tenderness     (  ) diffuse tenderness  (  ) Splenomegaly      (  ) Hepatomegaly      (  ) Jaundice     (  ) ecchymosis     EXTREMITIES: contracted LE   ( x ) Normal     (  ) Rash     (  ) ecchymosis     (  ) varicose veins      (  ) pitting edema     (  ) non-pitting edema   (  ) ulceration     (  ) gangrene:     (location:     )    NERVOUS SYSTEM:  pt nonverbal at baseline, does not follow commands.   (  ) A&Ox3     (  ) confused     (  ) lethargic  CN II-XII:     (  ) Intact     (  ) deficits found     (Specify:     )   Upper extremities:     (  ) no sensorimotor deficits     (  ) weakness     (  ) loss of proprioception/vibration     (  ) loss of touch/temperature (specify:    )  Lower extremities:     (  ) no sensorimotor deficits     (  ) weakness     (  ) loss of proprioception/vibration     (  ) loss of touch/temperature (specify:    )    SKIN: blisters on b/l feet  (  ) No rashes or lesions     (  ) maculopapular rash     (  ) pustules     (  ) vesicles     (  ) ulcer     (  ) ecchymosis     (specify location:     )    AMPAC score:    (  ) Indwelling Madsen Catheter:   Date insterted:    Reason (  ) Critical illness     (  ) urinary retention    (  ) Accurate Ins/Outs Monitoring     (  ) CMO patient    (  ) Central Line:   Date inserted:  Location: (  ) Right IJ     (  ) Left IJ     (  ) Right Fem     (  ) Left Fem    (  ) SPC        (  ) pigtail       (  ) PEG tube       (  ) colostomy       (  ) jejunostomy  (  ) U-Dall    LABS:                        9.7    7.79  )-----------( 493      ( 17 Aug 2023 07:18 )             31.4     08-17    136  |  101  |  18  ----------------------------<  106<H>  4.5   |  27  |  0.7    Ca    8.6      17 Aug 2023 07:18  Mg     2.4     08-17        Urinalysis Basic - ( 17 Aug 2023 07:18 )    Color: x / Appearance: x / SG: x / pH: x  Gluc: 106 mg/dL / Ketone: x  / Bili: x / Urobili: x   Blood: x / Protein: x / Nitrite: x   Leuk Esterase: x / RBC: x / WBC x   Sq Epi: x / Non Sq Epi: x / Bacteria: x                RADIOLOGY:

## 2023-08-17 NOTE — PROGRESS NOTE ADULT - SUBJECTIVE AND OBJECTIVE BOX
SUBJECTIVE:    Patient is a 58y old Female who presents with a chief complaint of sepsis (14 Aug 2023 07:43)    Currently admitted to medicine with the primary diagnosis of Sepsis       Today is hospital day 5d.     PAST MEDICAL & SURGICAL HISTORY  Down syndrome    Osteoporosis    Mild anemia    Neuropathy    S/P debridement  of R hip on 3/2/21      ALLERGIES:  No Known Allergies    MEDICATIONS:  STANDING MEDICATIONS  chlorhexidine 2% Cloths 1 Application(s) Topical <User Schedule>  enoxaparin Injectable 40 milliGRAM(s) SubCutaneous every 24 hours  gabapentin 600 milliGRAM(s) Oral daily  melatonin 5 milliGRAM(s) Oral at bedtime  meropenem  IVPB 1000 milliGRAM(s) IV Intermittent every 8 hours  midodrine 5 milliGRAM(s) Oral every 8 hours  multivitamin 1 Tablet(s) Oral daily  pantoprazole  Injectable 40 milliGRAM(s) IV Push daily  raloxifene 60 milliGRAM(s) Oral daily    PRN MEDICATIONS  acetaminophen     Tablet .. 650 milliGRAM(s) Oral every 6 hours PRN    VITALS:   T(F): 98.3  HR: 95  BP: 111/59  RR: 18  SpO2: 94%    LABS:                        9.7    7.79  )-----------( 493      ( 17 Aug 2023 07:18 )             31.4     08-17    136  |  101  |  18  ----------------------------<  106<H>  4.5   |  27  |  0.7    Ca    8.6      17 Aug 2023 07:18  Mg     2.4     08-17        Urinalysis Basic - ( 17 Aug 2023 07:18 )    Color: x / Appearance: x / SG: x / pH: x  Gluc: 106 mg/dL / Ketone: x  / Bili: x / Urobili: x   Blood: x / Protein: x / Nitrite: x   Leuk Esterase: x / RBC: x / WBC x   Sq Epi: x / Non Sq Epi: x / Bacteria: x                RADIOLOGY:    PHYSICAL EXAM:  GEN: No acute distress  LUNGS: Clear to auscultation bilaterally   HEART: S1/S2 present. RRR.   ABD/ GI: Soft, non-tender, non-distended. Bowel sounds present  EXT: NC/NC/NE/2+PP/COHEN  NEURO: AAOX3

## 2023-08-18 ENCOUNTER — TRANSCRIPTION ENCOUNTER (OUTPATIENT)
Age: 58
End: 2023-08-18

## 2023-08-18 VITALS
TEMPERATURE: 97 F | SYSTOLIC BLOOD PRESSURE: 103 MMHG | OXYGEN SATURATION: 96 % | DIASTOLIC BLOOD PRESSURE: 57 MMHG | RESPIRATION RATE: 18 BRPM | HEART RATE: 104 BPM

## 2023-08-18 PROCEDURE — 99239 HOSP IP/OBS DSCHRG MGMT >30: CPT

## 2023-08-18 RX ADMIN — ENOXAPARIN SODIUM 40 MILLIGRAM(S): 100 INJECTION SUBCUTANEOUS at 07:47

## 2023-08-18 RX ADMIN — PANTOPRAZOLE SODIUM 40 MILLIGRAM(S): 20 TABLET, DELAYED RELEASE ORAL at 14:21

## 2023-08-18 RX ADMIN — CHLORHEXIDINE GLUCONATE 1 APPLICATION(S): 213 SOLUTION TOPICAL at 05:15

## 2023-08-18 RX ADMIN — Medication 1 TABLET(S): at 14:21

## 2023-08-18 RX ADMIN — GABAPENTIN 600 MILLIGRAM(S): 400 CAPSULE ORAL at 14:21

## 2023-08-18 RX ADMIN — MIDODRINE HYDROCHLORIDE 5 MILLIGRAM(S): 2.5 TABLET ORAL at 05:12

## 2023-08-18 RX ADMIN — RALOXIFENE HYDROCHLORIDE 60 MILLIGRAM(S): 60 TABLET, COATED ORAL at 14:22

## 2023-08-18 RX ADMIN — ERTAPENEM SODIUM 120 MILLIGRAM(S): 1 INJECTION, POWDER, LYOPHILIZED, FOR SOLUTION INTRAMUSCULAR; INTRAVENOUS at 07:47

## 2023-08-18 RX ADMIN — MIDODRINE HYDROCHLORIDE 5 MILLIGRAM(S): 2.5 TABLET ORAL at 14:21

## 2023-08-18 NOTE — DISCHARGE NOTE PROVIDER - NSDCCPCAREPLAN_GEN_ALL_CORE_FT
PRINCIPAL DISCHARGE DIAGNOSIS  Diagnosis: Sepsis  Assessment and Plan of Treatment: Sepsis is a serious condition that occurs when the body overreacts to an infection. It is also called systemic inflammatory response syndrome (SIRS) with infection. An infection is usually caused by bacteria that attack the body. The body's defense system normally fights off infection within the affected body part. With sepsis, the body overreacts and causes symptoms to occur throughout the body. This leads to uncontrolled and widespread inflammation and clotting in small blood vessels. Blood flow to different body parts decreases and may lead to organ failure. Sepsis requires immediate treatment.  You were treated in the hospital with IV antibiotics, and your symptoms resolved.   Please seek immediate medical attention if you develop fever >100.4 F, feel dizzy, have nausea or vomiting, coughing blood, have sudden trouble breathing or chest pain, severe weakness, or your skin or lips are turning blue.        SECONDARY DISCHARGE DIAGNOSES  Diagnosis: SOB (shortness of breath)  Assessment and Plan of Treatment:       Diagnosis: Wheezing  Assessment and Plan of Treatment:

## 2023-08-18 NOTE — DISCHARGE NOTE PROVIDER - CARE PROVIDER_API CALL
Maggie Crow  Internal Medicine  [address] [place] [id number]  Phone: [phone]  Fax: [phone]  Follow Up Time: 1 week    Vince Rivera  Gastroenterology  [address] [place] [id number]  Phone: [phone]  Fax: [phone]  Follow Up Time: 2 weeks

## 2023-08-18 NOTE — PROGRESS NOTE ADULT - SUBJECTIVE AND OBJECTIVE BOX
SUBJECTIVE:    Patient is a 58y old Female who presents with a chief complaint of sepsis (14 Aug 2023 07:43)    Currently admitted to medicine with the primary diagnosis of Sepsis       Today is hospital day 6d.     PAST MEDICAL & SURGICAL HISTORY  Down syndrome    Osteoporosis    Mild anemia    Neuropathy    S/P debridement  of R hip on 3/2/21      ALLERGIES:  No Known Allergies    MEDICATIONS:  STANDING MEDICATIONS  chlorhexidine 2% Cloths 1 Application(s) Topical <User Schedule>  enoxaparin Injectable 40 milliGRAM(s) SubCutaneous every 24 hours  gabapentin 600 milliGRAM(s) Oral daily  melatonin 5 milliGRAM(s) Oral at bedtime  midodrine 5 milliGRAM(s) Oral every 8 hours  multivitamin 1 Tablet(s) Oral daily  pantoprazole  Injectable 40 milliGRAM(s) IV Push daily  raloxifene 60 milliGRAM(s) Oral daily    PRN MEDICATIONS  acetaminophen     Tablet .. 650 milliGRAM(s) Oral every 6 hours PRN    VITALS:   T(F): 97  HR: 74  BP: 105/61  RR: 18  SpO2: 96%    LABS:                        9.7    7.79  )-----------( 493      ( 17 Aug 2023 07:18 )             31.4     08-17    136  |  101  |  18  ----------------------------<  106<H>  4.5   |  27  |  0.7    Ca    8.6      17 Aug 2023 07:18  Mg     2.4     08-17        Urinalysis Basic - ( 17 Aug 2023 07:18 )    Color: x / Appearance: x / SG: x / pH: x  Gluc: 106 mg/dL / Ketone: x  / Bili: x / Urobili: x   Blood: x / Protein: x / Nitrite: x   Leuk Esterase: x / RBC: x / WBC x   Sq Epi: x / Non Sq Epi: x / Bacteria: x                RADIOLOGY:    PHYSICAL EXAM:  GEN: No acute distress  LUNGS: Clear to auscultation bilaterally   HEART: S1/S2 present. RRR.   ABD/ GI: Soft, non-tender, non-distended. Bowel sounds present  EXT: contracted ext with ulcers  NEURO: Awake and alert

## 2023-08-18 NOTE — PROGRESS NOTE ADULT - ASSESSMENT
58 year old Female with a PMHx of Down syndrome, nonverbal at baseline, hypothyroidism, cerebral palsy, congenital pulmonary stenosis brought in by EMS for SOB. Pt was discharged one day prior to admission for UTI/PNA sepsis w/ EBSL on meropenem.    Septic shock, POA -resolved, off levophed   Acute Cystitis on meropenem till 8/18 as per ID.  Multifocal PNA/Suspected Aspiration -- resolved  Acute Hypoxic Respiratory Failure on venturi mask in the ED now on RA  Lactic Acidosis - resolved  CT A/P in the ED showed Diffuse urinary bladder wall thickening, compatible with urinary bladder cystitis, infection. No hydronephrosis or renal calculus. Increased right lower lobe, decreased left lower lobe airspace opacities, compatible with pneumonia in the appropriate clinical setting.   off Levophed  If hypotensive, trial of midodrine Q8H  - Procal 10 on 8/12<--- 0.6 8/10  Ucx 08/04: ESBL E.Coli, Blood cx 8/12 - NGTD, nasal MRSA neg   -  change meropenem to ertapenem  -- tolerating PO diet per s/s  -   Down syndrome  Congential P stenosis  TTE:  1. Suboptimal and very limited study.   2. Left ventricular ejection fraction, by visual estimation, is >55%.   3. Normal global left ventricular systolic function.   4. The left ventricular diastolic function could not be assessed in this   study.  - avoid fluid overload     Osteoporosis - Raloxifene     Peripheral Neuropathy - Gabapentin    - DVT Prophylaxis: Lovenox  DC to group home today--spent more than 30mins   58 year old Female with a PMHx of Down syndrome, nonverbal at baseline, hypothyroidism, cerebral palsy, congenital pulmonary stenosis brought in by EMS for SOB. Pt was discharged one day prior to admission for UTI/PNA sepsis w/ EBSL on meropenem.    Septic shock, POA -resolved, off levophed   Acute Cystitis on meropenem till 8/18 as per ID.  Multifocal PNA/Suspected Aspiration -- resolved  Acute Hypoxic Respiratory Failure on venturi mask in the ED now on RA  Lactic Acidosis - resolved  CT A/P in the ED showed Diffuse urinary bladder wall thickening, compatible with urinary bladder cystitis, infection. No hydronephrosis or renal calculus. Increased right lower lobe, decreased left lower lobe airspace opacities, compatible with pneumonia in the appropriate clinical setting.   off Levophed  If hypotensive, trial of midodrine Q8H  - Procal 10 on 8/12<--- 0.6 8/10  Ucx 08/04: ESBL E.Coli, Blood cx 8/12 - NGTD, nasal MRSA neg   -  change meropenem to ertapenem-- completed a total of 7 days.  -- tolerating PO diet per s/s  -   Down syndrome  Congential P stenosis  TTE:  1. Suboptimal and very limited study.   2. Left ventricular ejection fraction, by visual estimation, is >55%.   3. Normal global left ventricular systolic function.   4. The left ventricular diastolic function could not be assessed in this   study.  - avoid fluid overload     Osteoporosis - Raloxifene     Peripheral Neuropathy - Gabapentin    - DVT Prophylaxis: Lovenox  DC to group home today--spent more than 30mins

## 2023-08-18 NOTE — DISCHARGE NOTE PROVIDER - CARE PROVIDERS DIRECT ADDRESSES
,DirectAddress_Unknown,[email or web address]

## 2023-08-18 NOTE — DISCHARGE NOTE PROVIDER - HOSPITAL COURSE
Ms. Linton is a 58 year old female with a PMHx of Down syndrome, nonverbal at baseline, hypothyroidism, cerebral palsy, congenital pulmonary stenosis brought in by EMS for SOB from NH. Pt was recently admitted (8/4-8/11) requiring ICU care for UTI/PNA sepsis w/ EBSL on meropenem. During that admission UCx As per EMS pt was saturating low to the 80s. In the ED pt was febrile, and mildly hypotensive. She was placed on on 6L NC and her saturation went up to 96%. Labs in the ED were significant for WBC 14, Lactate 2.6, +UA. CT A/P in the ED showed Diffuse urinary bladder wall thickening, compatible with urinary bladder cystitis, infection. No hydronephrosis or renal calculus. Increased right lower lobe, decreased left lower lobe airspace opacities, compatible with pneumonia in the appropriate clinical setting. The pt was started on vancomycin and meropenem and admitted to SDU for Sepsis 2/2 PNA.    CEU Course: Pt was hypotensive requiring levophed drip and gentle hydration. Pt's previous urine cultures were positive for ESBL and blood cultures negative to date, vanc was discontinued. Pt was weaned off of levophed and given 5mg midodrine. Pt was tolerated PO diet well and IVF were discontinued. Pt is stable for downgrade to floor.     Ms. Linton is a 58 year old female with a PMHx of Down syndrome, nonverbal at baseline, hypothyroidism, cerebral palsy, congenital pulmonary stenosis brought in by EMS for SOB from NH. Pt was recently admitted (8/4-8/11) requiring ICU care for UTI/PNA sepsis w/ EBSL on meropenem. As per EMS pt was saturating low to the 80s. In the ED pt was febrile, and mildly hypotensive. She was placed on on 6L NC and her saturation went up to 96%. Labs in the ED were significant for WBC 14, Lactate 2.6, +UA. CT A/P in the ED showed Diffuse urinary bladder wall thickening, compatible with urinary bladder cystitis, infection. No hydronephrosis or renal calculus. Increased right lower lobe, decreased left lower lobe airspace opacities, compatible with pneumonia in the appropriate clinical setting. The pt was started on vancomycin and meropenem and admitted to SDU for Sepsis 2/2 PNA.    CEU Course: Pt was hypotensive requiring levophed drip and gentle hydration. Pt's previous urine cultures were positive for ESBL; this admission UCx and BCx negative. ID was consulted, vancomycin was discontinued, meropenem continued. Pt was weaned off of levophed and given 5mg midodrine. Pt tolerated PO diet well and IVF were discontinued. Pt stable for downgrade to floor.    On the floor, pt remained hemodynamically stable and afebrile. UCx and BCx negative to date. CXR 8/14 showed left opacity/effusion, repeat CXR 8/16 showed no opacity/effusion. Patient continued to receive meropenem until day of discharge and will not require antibiotics after discharge. Patient was seen by wound care team for blood-filled blisters on bilateral feet and fissure to gluteal cleft. B/l feet wounds with intact edges, no tunneling/undermining, no exudate, odor, erythema, induration, warmth, or pain; recommended cleansing wounds with soap and water, pat dry, apply Cavilon, cover with Allevyn twice a day, PRN for soiling. Gluteal fissure  with no exudate, no odor; recommended cleansing with soap and water, pat dry, apply moisture barrier cream, leave open to air, twice a day, PRN for soiling. Patient was seen by speech and swallow team, was placed on pureed diet with mildly thick liquids for concerns of possible aspiration. A modified barium swallow study was performed, did not show aspiration but showed mild pharyngeal dysphagia for thin liquids, functional pharyngeal swallow for thin, mildly thick and pureed; recommended to put patient on pureed diet with thin liquids with 1:1 observation while eating and aspiration precautions.     Patient is medically stable for discharge to group home.        Ms. Linton is a 58 year old female with a PMHx of Down syndrome, nonverbal at baseline, hypothyroidism, cerebral palsy, congenital pulmonary stenosis brought in by EMS for SOB from NH. Pt was recently admitted (8/4-8/11) requiring ICU care for UTI/PNA sepsis w/ EBSL on meropenem. As per EMS pt was saturating low to the 80s. In the ED pt was febrile, and mildly hypotensive. She was placed on on 6L NC and her saturation went up to 96%. Labs in the ED were significant for WBC 14, Lactate 2.6, +UA. CT A/P in the ED showed Diffuse urinary bladder wall thickening, compatible with urinary bladder cystitis, infection. No hydronephrosis or renal calculus. Increased right lower lobe, decreased left lower lobe airspace opacities, compatible with pneumonia in the appropriate clinical setting. The pt was started on vancomycin and meropenem and admitted to SDU for Sepsis 2/2 PNA.    CEU Course: Pt was hypotensive requiring levophed drip and gentle hydration. Pt's previous urine cultures were positive for ESBL; this admission UCx and BCx negative. ID was consulted, vancomycin was discontinued, meropenem continued. Pt was weaned off of levophed and given 5mg midodrine. Pt tolerated PO diet well and IVF were discontinued. Pt stable for downgrade to floor.    On the floor, pt remained hemodynamically stable and afebrile. UCx and BCx negative to date. CXR 8/14 showed left opacity/effusion, repeat CXR 8/16 showed no opacity/effusion. Patient continued to receive meropenem, switched to ertapenem on day of discharge, and will not require antibiotics after discharge. Patient was seen by wound care team for blood-filled blisters on bilateral feet and fissure to gluteal cleft. B/l feet wounds with intact edges, no tunneling/undermining, no exudate, odor, erythema, induration, warmth, or pain; recommended cleansing wounds with soap and water, pat dry, apply Cavilon, cover with Allevyn twice a day, PRN for soiling. Gluteal fissure  with no exudate, no odor; recommended cleansing with soap and water, pat dry, apply moisture barrier cream, leave open to air, twice a day, PRN for soiling. Patient was seen by speech and swallow team, was placed on pureed diet with mildly thick liquids for concerns of possible aspiration. A modified barium swallow study was performed, did not show aspiration but showed mild pharyngeal dysphagia for thin liquids, functional pharyngeal swallow for thin, mildly thick and pureed; recommended to put patient on pureed diet with thin liquids with 1:1 observation while eating and aspiration precautions.     Patient is medically stable for discharge to group home.

## 2023-08-18 NOTE — DISCHARGE NOTE NURSING/CASE MANAGEMENT/SOCIAL WORK - PATIENT PORTAL LINK FT
You can access the FollowMyHealth Patient Portal offered by [facility] by registering at the following website: [email or web address]. By joining [employer]’s FollowMyHealth portal, you will also be able to view your health information using other applications (apps) compatible with our system.

## 2023-08-18 NOTE — DISCHARGE NOTE PROVIDER - NSDCFUADDINST_GEN_ALL_CORE_FT
Please follow up with your PMD in 1-2 weeks.   Please follow up with GI for an outpatient EGD for CT A/P findings of hiatal hernia, reactive LNs from last admission.     Wound care instructions:  B/l feet wounds: recommended cleansing wounds with soap and water, pat dry, apply Cavilon, cover with Allevyn twice a day, PRN for soiling.   Gluteal fissure: recommended cleansing with soap and water, pat dry, apply moisture barrier cream, leave open to air, twice a day, PRN for soiling.

## 2023-08-18 NOTE — DISCHARGE NOTE PROVIDER - PROVIDER TOKENS
PROVIDER:[TOKEN:[[id number]],FOLLOWUP:[1 week]],PROVIDER:[TOKEN:[[id number]],FOLLOWUP:[2 weeks]]

## 2023-08-18 NOTE — DISCHARGE NOTE PROVIDER - NSDCMRMEDTOKEN_GEN_ALL_CORE_FT
gabapentin 600 mg oral tablet: 1 orally once a day  melatonin 5 mg oral tablet: 1 tab(s) orally once a day (at bedtime)  Multiple Vitamins oral tablet: 1 tab(s) orally once a day  pantoprazole 40 mg oral delayed release tablet: 1 tab(s) orally once a day  raloxifene 60 mg oral tablet: 1 tab(s) orally once a day

## 2023-08-18 NOTE — DISCHARGE NOTE PROVIDER - NSDCFUSCHEDAPPT_GEN_ALL_CORE_FT
Behuria, Supreeti  [facility] PreAdmits  Scheduled Appointment: 10/17/2023    [facility] Physician [facility]  CARDIOLOGY  [place]   Scheduled Appointment: 10/17/2023

## 2023-08-18 NOTE — PROGRESS NOTE ADULT - PROVIDER SPECIALTY LIST ADULT
Infectious Disease
Internal Medicine
Hospitalist
Hospitalist
Internal Medicine
Critical Care
Critical Care
Hospitalist
Infectious Disease

## 2023-08-24 DIAGNOSIS — A41.9 SEPSIS, UNSPECIFIED ORGANISM: ICD-10-CM

## 2023-08-24 DIAGNOSIS — J96.01 ACUTE RESPIRATORY FAILURE WITH HYPOXIA: ICD-10-CM

## 2023-08-24 DIAGNOSIS — R65.21 SEVERE SEPSIS WITH SEPTIC SHOCK: ICD-10-CM

## 2023-08-24 DIAGNOSIS — G62.9 POLYNEUROPATHY, UNSPECIFIED: ICD-10-CM

## 2023-08-24 DIAGNOSIS — M81.0 AGE-RELATED OSTEOPOROSIS WITHOUT CURRENT PATHOLOGICAL FRACTURE: ICD-10-CM

## 2023-08-24 DIAGNOSIS — N30.00 ACUTE CYSTITIS WITHOUT HEMATURIA: ICD-10-CM

## 2023-08-24 DIAGNOSIS — Q22.1 CONGENITAL PULMONARY VALVE STENOSIS: ICD-10-CM

## 2023-08-24 DIAGNOSIS — J18.9 PNEUMONIA, UNSPECIFIED ORGANISM: ICD-10-CM

## 2023-08-24 DIAGNOSIS — Q90.9 DOWN SYNDROME, UNSPECIFIED: ICD-10-CM

## 2023-08-24 DIAGNOSIS — E87.20 ACIDOSIS, UNSPECIFIED: ICD-10-CM

## 2023-08-24 DIAGNOSIS — R13.13 DYSPHAGIA, PHARYNGEAL PHASE: ICD-10-CM

## 2023-08-24 DIAGNOSIS — Z79.01 LONG TERM (CURRENT) USE OF ANTICOAGULANTS: ICD-10-CM

## 2023-09-29 ENCOUNTER — INPATIENT (INPATIENT)
Facility: HOSPITAL | Age: 58
LOS: 13 days | Discharge: NURSING, REHAB OR LONG-TERM CARE | DRG: 870 | End: 2023-10-13
Attending: INTERNAL MEDICINE | Admitting: STUDENT IN AN ORGANIZED HEALTH CARE EDUCATION/TRAINING PROGRAM
Payer: COMMERCIAL

## 2023-09-29 VITALS — RESPIRATION RATE: 14 BRPM

## 2023-09-29 DIAGNOSIS — Z98.890 OTHER SPECIFIED POSTPROCEDURAL STATES: Chronic | ICD-10-CM

## 2023-09-29 DIAGNOSIS — K92.2 GASTROINTESTINAL HEMORRHAGE, UNSPECIFIED: ICD-10-CM

## 2023-09-29 LAB
ALBUMIN SERPL ELPH-MCNC: 3 G/DL — LOW (ref 3.5–5.2)
ALP SERPL-CCNC: 78 U/L — SIGNIFICANT CHANGE UP (ref 30–115)
ALT FLD-CCNC: 17 U/L — SIGNIFICANT CHANGE UP (ref 0–41)
ANION GAP SERPL CALC-SCNC: 13 MMOL/L — SIGNIFICANT CHANGE UP (ref 7–14)
ANION GAP SERPL CALC-SCNC: 15 MMOL/L — HIGH (ref 7–14)
ANION GAP SERPL CALC-SCNC: 17 MMOL/L — HIGH (ref 7–14)
APTT BLD: 27.6 SEC — SIGNIFICANT CHANGE UP (ref 27–39.2)
AST SERPL-CCNC: 67 U/L — HIGH (ref 0–41)
BASE EXCESS BLDA CALC-SCNC: -4.2 MMOL/L — LOW (ref -2–3)
BASE EXCESS BLDV CALC-SCNC: -4.4 MMOL/L — LOW (ref -2–3)
BASOPHILS # BLD AUTO: 0.05 K/UL — SIGNIFICANT CHANGE UP (ref 0–0.2)
BASOPHILS # BLD AUTO: 0.05 K/UL — SIGNIFICANT CHANGE UP (ref 0–0.2)
BASOPHILS NFR BLD AUTO: 0.3 % — SIGNIFICANT CHANGE UP (ref 0–1)
BASOPHILS NFR BLD AUTO: 0.4 % — SIGNIFICANT CHANGE UP (ref 0–1)
BILIRUB SERPL-MCNC: 0.4 MG/DL — SIGNIFICANT CHANGE UP (ref 0.2–1.2)
BLD GP AB SCN SERPL QL: SIGNIFICANT CHANGE UP
BUN SERPL-MCNC: 22 MG/DL — HIGH (ref 10–20)
BUN SERPL-MCNC: 22 MG/DL — HIGH (ref 10–20)
BUN SERPL-MCNC: 23 MG/DL — HIGH (ref 10–20)
CA-I SERPL-SCNC: 1.25 MMOL/L — SIGNIFICANT CHANGE UP (ref 1.15–1.33)
CALCIUM SERPL-MCNC: 8.2 MG/DL — LOW (ref 8.4–10.4)
CALCIUM SERPL-MCNC: 8.4 MG/DL — SIGNIFICANT CHANGE UP (ref 8.4–10.5)
CALCIUM SERPL-MCNC: 8.9 MG/DL — SIGNIFICANT CHANGE UP (ref 8.4–10.4)
CHLORIDE SERPL-SCNC: 103 MMOL/L — SIGNIFICANT CHANGE UP (ref 98–110)
CHLORIDE SERPL-SCNC: 104 MMOL/L — SIGNIFICANT CHANGE UP (ref 98–110)
CHLORIDE SERPL-SCNC: 108 MMOL/L — SIGNIFICANT CHANGE UP (ref 98–110)
CO2 SERPL-SCNC: 17 MMOL/L — SIGNIFICANT CHANGE UP (ref 17–32)
CO2 SERPL-SCNC: 18 MMOL/L — SIGNIFICANT CHANGE UP (ref 17–32)
CO2 SERPL-SCNC: 20 MMOL/L — SIGNIFICANT CHANGE UP (ref 17–32)
CREAT SERPL-MCNC: 1.4 MG/DL — SIGNIFICANT CHANGE UP (ref 0.7–1.5)
CREAT SERPL-MCNC: 1.4 MG/DL — SIGNIFICANT CHANGE UP (ref 0.7–1.5)
CREAT SERPL-MCNC: 1.6 MG/DL — HIGH (ref 0.7–1.5)
EGFR: 37 ML/MIN/1.73M2 — LOW
EGFR: 44 ML/MIN/1.73M2 — LOW
EGFR: 44 ML/MIN/1.73M2 — LOW
EOSINOPHIL # BLD AUTO: 0 K/UL — SIGNIFICANT CHANGE UP (ref 0–0.7)
EOSINOPHIL # BLD AUTO: 0.01 K/UL — SIGNIFICANT CHANGE UP (ref 0–0.7)
EOSINOPHIL NFR BLD AUTO: 0 % — SIGNIFICANT CHANGE UP (ref 0–8)
EOSINOPHIL NFR BLD AUTO: 0.1 % — SIGNIFICANT CHANGE UP (ref 0–8)
GAS PNL BLDA: SIGNIFICANT CHANGE UP
GAS PNL BLDV: 141 MMOL/L — SIGNIFICANT CHANGE UP (ref 136–145)
GAS PNL BLDV: SIGNIFICANT CHANGE UP
GLUCOSE SERPL-MCNC: 210 MG/DL — HIGH (ref 70–99)
GLUCOSE SERPL-MCNC: 213 MG/DL — HIGH (ref 70–99)
GLUCOSE SERPL-MCNC: 285 MG/DL — HIGH (ref 70–99)
HCO3 BLDA-SCNC: 20 MMOL/L — LOW (ref 21–28)
HCO3 BLDV-SCNC: 25 MMOL/L — SIGNIFICANT CHANGE UP (ref 22–29)
HCT VFR BLD CALC: 34.4 % — LOW (ref 37–47)
HCT VFR BLD CALC: 36.9 % — LOW (ref 37–47)
HCT VFR BLD CALC: 40.1 % — SIGNIFICANT CHANGE UP (ref 37–47)
HCT VFR BLD CALC: 48.2 % — HIGH (ref 37–47)
HCT VFR BLDA CALC: 30 % — LOW (ref 39–51)
HGB BLD CALC-MCNC: 10.1 G/DL — LOW (ref 12.6–17.4)
HGB BLD-MCNC: 11.8 G/DL — LOW (ref 12–16)
HGB BLD-MCNC: 12.5 G/DL — SIGNIFICANT CHANGE UP (ref 12–16)
HGB BLD-MCNC: 14.7 G/DL — SIGNIFICANT CHANGE UP (ref 12–16)
HGB BLD-MCNC: 9.9 G/DL — LOW (ref 12–16)
HOROWITZ INDEX BLDA+IHG-RTO: 50 — SIGNIFICANT CHANGE UP
IMM GRANULOCYTES NFR BLD AUTO: 0.4 % — HIGH (ref 0.1–0.3)
IMM GRANULOCYTES NFR BLD AUTO: 0.7 % — HIGH (ref 0.1–0.3)
INR BLD: 1.18 RATIO — SIGNIFICANT CHANGE UP (ref 0.65–1.3)
LACTATE BLDV-MCNC: 6.7 MMOL/L — CRITICAL HIGH (ref 0.5–2)
LACTATE SERPL-SCNC: 5.3 MMOL/L — CRITICAL HIGH (ref 0.7–2)
LYMPHOCYTES # BLD AUTO: 1.17 K/UL — LOW (ref 1.2–3.4)
LYMPHOCYTES # BLD AUTO: 1.63 K/UL — SIGNIFICANT CHANGE UP (ref 1.2–3.4)
LYMPHOCYTES # BLD AUTO: 10.3 % — LOW (ref 20.5–51.1)
LYMPHOCYTES # BLD AUTO: 10.7 % — LOW (ref 20.5–51.1)
MAGNESIUM SERPL-MCNC: 2.3 MG/DL — SIGNIFICANT CHANGE UP (ref 1.8–2.4)
MCHC RBC-ENTMCNC: 20.8 PG — LOW (ref 27–31)
MCHC RBC-ENTMCNC: 23 PG — LOW (ref 27–31)
MCHC RBC-ENTMCNC: 23.2 PG — LOW (ref 27–31)
MCHC RBC-ENTMCNC: 23.2 PG — LOW (ref 27–31)
MCHC RBC-ENTMCNC: 28.8 G/DL — LOW (ref 32–37)
MCHC RBC-ENTMCNC: 30.5 G/DL — LOW (ref 32–37)
MCHC RBC-ENTMCNC: 31.2 G/DL — LOW (ref 32–37)
MCHC RBC-ENTMCNC: 32 G/DL — SIGNIFICANT CHANGE UP (ref 32–37)
MCV RBC AUTO: 72.4 FL — LOW (ref 81–99)
MCV RBC AUTO: 72.6 FL — LOW (ref 81–99)
MCV RBC AUTO: 73.8 FL — LOW (ref 81–99)
MCV RBC AUTO: 76 FL — LOW (ref 81–99)
MONOCYTES # BLD AUTO: 0.37 K/UL — SIGNIFICANT CHANGE UP (ref 0.1–0.6)
MONOCYTES # BLD AUTO: 0.76 K/UL — HIGH (ref 0.1–0.6)
MONOCYTES NFR BLD AUTO: 3.2 % — SIGNIFICANT CHANGE UP (ref 1.7–9.3)
MONOCYTES NFR BLD AUTO: 5 % — SIGNIFICANT CHANGE UP (ref 1.7–9.3)
MRSA PCR RESULT.: NEGATIVE — SIGNIFICANT CHANGE UP
NEUTROPHILS # BLD AUTO: 12.66 K/UL — HIGH (ref 1.4–6.5)
NEUTROPHILS # BLD AUTO: 9.76 K/UL — HIGH (ref 1.4–6.5)
NEUTROPHILS NFR BLD AUTO: 83.2 % — HIGH (ref 42.2–75.2)
NEUTROPHILS NFR BLD AUTO: 85.7 % — HIGH (ref 42.2–75.2)
NRBC # BLD: 0 /100 WBCS — SIGNIFICANT CHANGE UP (ref 0–0)
NT-PROBNP SERPL-SCNC: 758 PG/ML — HIGH (ref 0–300)
PCO2 BLDA: 34 MMHG — SIGNIFICANT CHANGE UP (ref 25–48)
PCO2 BLDV: 66 MMHG — HIGH (ref 39–42)
PH BLDA: 7.38 — SIGNIFICANT CHANGE UP (ref 7.35–7.45)
PH BLDV: 7.18 — LOW (ref 7.32–7.43)
PHOSPHATE SERPL-MCNC: 5.4 MG/DL — HIGH (ref 2.1–4.9)
PLATELET # BLD AUTO: 244 K/UL — SIGNIFICANT CHANGE UP (ref 130–400)
PLATELET # BLD AUTO: 285 K/UL — SIGNIFICANT CHANGE UP (ref 130–400)
PLATELET # BLD AUTO: 300 K/UL — SIGNIFICANT CHANGE UP (ref 130–400)
PLATELET # BLD AUTO: 436 K/UL — HIGH (ref 130–400)
PMV BLD: 10 FL — SIGNIFICANT CHANGE UP (ref 7.4–10.4)
PMV BLD: 10.1 FL — SIGNIFICANT CHANGE UP (ref 7.4–10.4)
PMV BLD: 9.5 FL — SIGNIFICANT CHANGE UP (ref 7.4–10.4)
PMV BLD: 9.6 FL — SIGNIFICANT CHANGE UP (ref 7.4–10.4)
PO2 BLDA: 95 MMHG — SIGNIFICANT CHANGE UP (ref 83–108)
PO2 BLDV: 25 MMHG — SIGNIFICANT CHANGE UP
POTASSIUM BLDV-SCNC: 4.9 MMOL/L — SIGNIFICANT CHANGE UP (ref 3.5–5.1)
POTASSIUM SERPL-MCNC: 4.1 MMOL/L — SIGNIFICANT CHANGE UP (ref 3.5–5)
POTASSIUM SERPL-MCNC: 4.4 MMOL/L — SIGNIFICANT CHANGE UP (ref 3.5–5)
POTASSIUM SERPL-MCNC: 5.7 MMOL/L — HIGH (ref 3.5–5)
POTASSIUM SERPL-SCNC: 4.1 MMOL/L — SIGNIFICANT CHANGE UP (ref 3.5–5)
POTASSIUM SERPL-SCNC: 4.4 MMOL/L — SIGNIFICANT CHANGE UP (ref 3.5–5)
POTASSIUM SERPL-SCNC: 5.7 MMOL/L — HIGH (ref 3.5–5)
PROT SERPL-MCNC: 6.6 G/DL — SIGNIFICANT CHANGE UP (ref 6–8)
PROTHROM AB SERPL-ACNC: 13.5 SEC — HIGH (ref 9.95–12.87)
RBC # BLD: 4.75 M/UL — SIGNIFICANT CHANGE UP (ref 4.2–5.4)
RBC # BLD: 5.08 M/UL — SIGNIFICANT CHANGE UP (ref 4.2–5.4)
RBC # BLD: 5.43 M/UL — HIGH (ref 4.2–5.4)
RBC # BLD: 6.34 M/UL — HIGH (ref 4.2–5.4)
RBC # FLD: 16.9 % — HIGH (ref 11.5–14.5)
RBC # FLD: 17.3 % — HIGH (ref 11.5–14.5)
RBC # FLD: 18.4 % — HIGH (ref 11.5–14.5)
RBC # FLD: 18.5 % — HIGH (ref 11.5–14.5)
SAO2 % BLDA: 98.7 % — HIGH (ref 94–98)
SAO2 % BLDV: 21.2 % — SIGNIFICANT CHANGE UP
SODIUM SERPL-SCNC: 136 MMOL/L — SIGNIFICANT CHANGE UP (ref 135–146)
SODIUM SERPL-SCNC: 138 MMOL/L — SIGNIFICANT CHANGE UP (ref 135–146)
SODIUM SERPL-SCNC: 141 MMOL/L — SIGNIFICANT CHANGE UP (ref 135–146)
TROPONIN T SERPL-MCNC: <0.01 NG/ML — SIGNIFICANT CHANGE UP
WBC # BLD: 11.39 K/UL — HIGH (ref 4.8–10.8)
WBC # BLD: 14.55 K/UL — HIGH (ref 4.8–10.8)
WBC # BLD: 15.21 K/UL — HIGH (ref 4.8–10.8)
WBC # BLD: 20.01 K/UL — HIGH (ref 4.8–10.8)
WBC # FLD AUTO: 11.39 K/UL — HIGH (ref 4.8–10.8)
WBC # FLD AUTO: 14.55 K/UL — HIGH (ref 4.8–10.8)
WBC # FLD AUTO: 15.21 K/UL — HIGH (ref 4.8–10.8)
WBC # FLD AUTO: 20.01 K/UL — HIGH (ref 4.8–10.8)

## 2023-09-29 PROCEDURE — 84132 ASSAY OF SERUM POTASSIUM: CPT

## 2023-09-29 PROCEDURE — C9113: CPT

## 2023-09-29 PROCEDURE — 85014 HEMATOCRIT: CPT

## 2023-09-29 PROCEDURE — 99223 1ST HOSP IP/OBS HIGH 75: CPT

## 2023-09-29 PROCEDURE — 86901 BLOOD TYPING SEROLOGIC RH(D): CPT

## 2023-09-29 PROCEDURE — 74177 CT ABD & PELVIS W/CONTRAST: CPT

## 2023-09-29 PROCEDURE — 74018 RADEX ABDOMEN 1 VIEW: CPT

## 2023-09-29 PROCEDURE — 92612 ENDOSCOPY SWALLOW (FEES) VID: CPT | Mod: GN

## 2023-09-29 PROCEDURE — 93005 ELECTROCARDIOGRAM TRACING: CPT

## 2023-09-29 PROCEDURE — 71045 X-RAY EXAM CHEST 1 VIEW: CPT

## 2023-09-29 PROCEDURE — 87640 STAPH A DNA AMP PROBE: CPT

## 2023-09-29 PROCEDURE — 93010 ELECTROCARDIOGRAM REPORT: CPT

## 2023-09-29 PROCEDURE — 94003 VENT MGMT INPAT SUBQ DAY: CPT

## 2023-09-29 PROCEDURE — 84145 PROCALCITONIN (PCT): CPT

## 2023-09-29 PROCEDURE — 36556 INSERT NON-TUNNEL CV CATH: CPT | Mod: LT

## 2023-09-29 PROCEDURE — 86900 BLOOD TYPING SEROLOGIC ABO: CPT

## 2023-09-29 PROCEDURE — 84295 ASSAY OF SERUM SODIUM: CPT

## 2023-09-29 PROCEDURE — 87449 NOS EACH ORGANISM AG IA: CPT

## 2023-09-29 PROCEDURE — 87070 CULTURE OTHR SPECIMN AEROBIC: CPT

## 2023-09-29 PROCEDURE — 99254 IP/OBS CNSLTJ NEW/EST MOD 60: CPT

## 2023-09-29 PROCEDURE — 99053 MED SERV 10PM-8AM 24 HR FAC: CPT

## 2023-09-29 PROCEDURE — 80048 BASIC METABOLIC PNL TOTAL CA: CPT

## 2023-09-29 PROCEDURE — 71045 X-RAY EXAM CHEST 1 VIEW: CPT | Mod: 26

## 2023-09-29 PROCEDURE — 36415 COLL VENOUS BLD VENIPUNCTURE: CPT

## 2023-09-29 PROCEDURE — 93971 EXTREMITY STUDY: CPT | Mod: LT

## 2023-09-29 PROCEDURE — 87641 MR-STAPH DNA AMP PROBE: CPT

## 2023-09-29 PROCEDURE — 87635 SARS-COV-2 COVID-19 AMP PRB: CPT

## 2023-09-29 PROCEDURE — 99285 EMERGENCY DEPT VISIT HI MDM: CPT | Mod: 25

## 2023-09-29 PROCEDURE — 83605 ASSAY OF LACTIC ACID: CPT

## 2023-09-29 PROCEDURE — 85027 COMPLETE CBC AUTOMATED: CPT

## 2023-09-29 PROCEDURE — 84100 ASSAY OF PHOSPHORUS: CPT

## 2023-09-29 PROCEDURE — 71250 CT THORAX DX C-: CPT

## 2023-09-29 PROCEDURE — 85025 COMPLETE CBC W/AUTO DIFF WBC: CPT

## 2023-09-29 PROCEDURE — 94760 N-INVAS EAR/PLS OXIMETRY 1: CPT

## 2023-09-29 PROCEDURE — 80053 COMPREHEN METABOLIC PANEL: CPT

## 2023-09-29 PROCEDURE — 99291 CRITICAL CARE FIRST HOUR: CPT | Mod: GC

## 2023-09-29 PROCEDURE — 83735 ASSAY OF MAGNESIUM: CPT

## 2023-09-29 PROCEDURE — 85018 HEMOGLOBIN: CPT

## 2023-09-29 PROCEDURE — 74177 CT ABD & PELVIS W/CONTRAST: CPT | Mod: 26

## 2023-09-29 PROCEDURE — 84484 ASSAY OF TROPONIN QUANT: CPT

## 2023-09-29 PROCEDURE — 92610 EVALUATE SWALLOWING FUNCTION: CPT | Mod: GN

## 2023-09-29 PROCEDURE — 94660 CPAP INITIATION&MGMT: CPT

## 2023-09-29 PROCEDURE — 71045 X-RAY EXAM CHEST 1 VIEW: CPT | Mod: 26,77

## 2023-09-29 PROCEDURE — 87899 AGENT NOS ASSAY W/OPTIC: CPT

## 2023-09-29 PROCEDURE — 92526 ORAL FUNCTION THERAPY: CPT | Mod: GN

## 2023-09-29 PROCEDURE — 82803 BLOOD GASES ANY COMBINATION: CPT

## 2023-09-29 PROCEDURE — 93306 TTE W/DOPPLER COMPLETE: CPT

## 2023-09-29 PROCEDURE — 86850 RBC ANTIBODY SCREEN: CPT

## 2023-09-29 PROCEDURE — 82330 ASSAY OF CALCIUM: CPT

## 2023-09-29 RX ORDER — CHLORHEXIDINE GLUCONATE 213 G/1000ML
1 SOLUTION TOPICAL
Refills: 0 | Status: DISCONTINUED | OUTPATIENT
Start: 2023-09-29 | End: 2023-10-13

## 2023-09-29 RX ORDER — FENTANYL CITRATE 50 UG/ML
0.5 INJECTION INTRAVENOUS
Qty: 2500 | Refills: 0 | Status: DISCONTINUED | OUTPATIENT
Start: 2023-09-29 | End: 2023-09-30

## 2023-09-29 RX ORDER — CHLORHEXIDINE GLUCONATE 213 G/1000ML
15 SOLUTION TOPICAL EVERY 12 HOURS
Refills: 0 | Status: DISCONTINUED | OUTPATIENT
Start: 2023-09-29 | End: 2023-10-04

## 2023-09-29 RX ORDER — AZITHROMYCIN 500 MG/1
500 TABLET, FILM COATED ORAL ONCE
Refills: 0 | Status: COMPLETED | OUTPATIENT
Start: 2023-09-29 | End: 2023-09-29

## 2023-09-29 RX ORDER — PROPOFOL 10 MG/ML
10 INJECTION, EMULSION INTRAVENOUS
Qty: 1000 | Refills: 0 | Status: DISCONTINUED | OUTPATIENT
Start: 2023-09-29 | End: 2023-10-01

## 2023-09-29 RX ORDER — SODIUM CHLORIDE 9 MG/ML
1000 INJECTION, SOLUTION INTRAVENOUS
Refills: 0 | Status: DISCONTINUED | OUTPATIENT
Start: 2023-09-29 | End: 2023-10-03

## 2023-09-29 RX ORDER — PANTOPRAZOLE SODIUM 20 MG/1
8 TABLET, DELAYED RELEASE ORAL
Qty: 80 | Refills: 0 | Status: DISCONTINUED | OUTPATIENT
Start: 2023-09-29 | End: 2023-09-29

## 2023-09-29 RX ORDER — MIDAZOLAM HYDROCHLORIDE 1 MG/ML
0.02 INJECTION, SOLUTION INTRAMUSCULAR; INTRAVENOUS
Qty: 100 | Refills: 0 | Status: DISCONTINUED | OUTPATIENT
Start: 2023-09-29 | End: 2023-09-29

## 2023-09-29 RX ORDER — NOREPINEPHRINE BITARTRATE/D5W 8 MG/250ML
0.05 PLASTIC BAG, INJECTION (ML) INTRAVENOUS
Qty: 16 | Refills: 0 | Status: DISCONTINUED | OUTPATIENT
Start: 2023-09-29 | End: 2023-09-30

## 2023-09-29 RX ORDER — VASOPRESSIN 20 [USP'U]/ML
0.04 INJECTION INTRAVENOUS
Qty: 40 | Refills: 0 | Status: DISCONTINUED | OUTPATIENT
Start: 2023-09-29 | End: 2023-10-01

## 2023-09-29 RX ORDER — NOREPINEPHRINE BITARTRATE/D5W 8 MG/250ML
0.05 PLASTIC BAG, INJECTION (ML) INTRAVENOUS
Qty: 8 | Refills: 0 | Status: DISCONTINUED | OUTPATIENT
Start: 2023-09-29 | End: 2023-09-29

## 2023-09-29 RX ORDER — MEROPENEM 1 G/30ML
1000 INJECTION INTRAVENOUS EVERY 12 HOURS
Refills: 0 | Status: DISCONTINUED | OUTPATIENT
Start: 2023-09-30 | End: 2023-09-30

## 2023-09-29 RX ORDER — PANTOPRAZOLE SODIUM 20 MG/1
80 TABLET, DELAYED RELEASE ORAL ONCE
Refills: 0 | Status: COMPLETED | OUTPATIENT
Start: 2023-09-29 | End: 2023-09-29

## 2023-09-29 RX ORDER — ACETAMINOPHEN 500 MG
1000 TABLET ORAL ONCE
Refills: 0 | Status: COMPLETED | OUTPATIENT
Start: 2023-09-29 | End: 2023-09-29

## 2023-09-29 RX ORDER — SODIUM CHLORIDE 9 MG/ML
1000 INJECTION, SOLUTION INTRAVENOUS ONCE
Refills: 0 | Status: COMPLETED | OUTPATIENT
Start: 2023-09-29 | End: 2023-09-29

## 2023-09-29 RX ORDER — FENTANYL CITRATE 50 UG/ML
0.5 INJECTION INTRAVENOUS
Qty: 2500 | Refills: 0 | Status: DISCONTINUED | OUTPATIENT
Start: 2023-09-29 | End: 2023-09-29

## 2023-09-29 RX ORDER — SODIUM CHLORIDE 9 MG/ML
250 INJECTION, SOLUTION INTRAVENOUS
Refills: 0 | Status: DISCONTINUED | OUTPATIENT
Start: 2023-09-29 | End: 2023-09-29

## 2023-09-29 RX ORDER — GABAPENTIN 400 MG/1
600 CAPSULE ORAL DAILY
Refills: 0 | Status: DISCONTINUED | OUTPATIENT
Start: 2023-09-29 | End: 2023-10-06

## 2023-09-29 RX ORDER — MEROPENEM 1 G/30ML
1000 INJECTION INTRAVENOUS ONCE
Refills: 0 | Status: COMPLETED | OUTPATIENT
Start: 2023-09-29 | End: 2023-09-29

## 2023-09-29 RX ORDER — PANTOPRAZOLE SODIUM 20 MG/1
8 TABLET, DELAYED RELEASE ORAL
Qty: 80 | Refills: 0 | Status: DISCONTINUED | OUTPATIENT
Start: 2023-09-29 | End: 2023-09-30

## 2023-09-29 RX ADMIN — PANTOPRAZOLE SODIUM 10 MG/HR: 20 TABLET, DELAYED RELEASE ORAL at 07:40

## 2023-09-29 RX ADMIN — Medication 400 MILLIGRAM(S): at 17:00

## 2023-09-29 RX ADMIN — CHLORHEXIDINE GLUCONATE 1 APPLICATION(S): 213 SOLUTION TOPICAL at 17:01

## 2023-09-29 RX ADMIN — MEROPENEM 100 MILLIGRAM(S): 1 INJECTION INTRAVENOUS at 17:00

## 2023-09-29 RX ADMIN — Medication 1000 MILLIGRAM(S): at 17:42

## 2023-09-29 RX ADMIN — AZITHROMYCIN 255 MILLIGRAM(S): 500 TABLET, FILM COATED ORAL at 10:52

## 2023-09-29 RX ADMIN — PANTOPRAZOLE SODIUM 10 MG/HR: 20 TABLET, DELAYED RELEASE ORAL at 17:00

## 2023-09-29 RX ADMIN — PANTOPRAZOLE SODIUM 10 MG/HR: 20 TABLET, DELAYED RELEASE ORAL at 10:04

## 2023-09-29 RX ADMIN — PROPOFOL 3.6 MICROGRAM(S)/KG/MIN: 10 INJECTION, EMULSION INTRAVENOUS at 09:20

## 2023-09-29 RX ADMIN — FENTANYL CITRATE 3 MICROGRAM(S)/KG/HR: 50 INJECTION INTRAVENOUS at 07:39

## 2023-09-29 RX ADMIN — SODIUM CHLORIDE 1000 MILLILITER(S): 9 INJECTION, SOLUTION INTRAVENOUS at 10:53

## 2023-09-29 RX ADMIN — PANTOPRAZOLE SODIUM 80 MILLIGRAM(S): 20 TABLET, DELAYED RELEASE ORAL at 07:40

## 2023-09-29 RX ADMIN — CHLORHEXIDINE GLUCONATE 15 MILLILITER(S): 213 SOLUTION TOPICAL at 17:01

## 2023-09-29 RX ADMIN — Medication 5.63 MICROGRAM(S)/KG/MIN: at 10:52

## 2023-09-29 RX ADMIN — MIDAZOLAM HYDROCHLORIDE 1.2 MG/KG/HR: 1 INJECTION, SOLUTION INTRAMUSCULAR; INTRAVENOUS at 07:39

## 2023-09-29 RX ADMIN — FENTANYL CITRATE 3 MICROGRAM(S)/KG/HR: 50 INJECTION INTRAVENOUS at 10:52

## 2023-09-29 RX ADMIN — VASOPRESSIN 3 UNIT(S)/MIN: 20 INJECTION INTRAVENOUS at 11:15

## 2023-09-29 RX ADMIN — Medication 100 MILLIGRAM(S): at 10:05

## 2023-09-29 NOTE — ED ADULT NURSE NOTE - OBJECTIVE STATEMENT
BIBEMS from group home intubated, pt found unresponsive, hypotensive, vomiting coffee ground emesis.

## 2023-09-29 NOTE — CONSULT NOTE ADULT - SUBJECTIVE AND OBJECTIVE BOX
Patient is a 58y old  Female who presents with a chief complaint of     HPI: 58-year-old female with PMH CP, Down syndrome, seizure disorder, osteoporosis, anemia brought in by EMS from group home for coffee-ground emesis since this morning per group home staff, hypoxic and altered on EMS arrival so intubated in field.  On presentation in the ED patient clothing covered in coffee-ground emesis, with coffee-ground emesis in ET tube, and coming out of nose and mouth.      PAST MEDICAL & SURGICAL HISTORY:  Down syndrome      Osteoporosis      Mild anemia      Neuropathy      S/P debridement  of R hip on 3/2/21          SOCIAL HX:   Smoking  no    FAMILY HISTORY:  :  No known cardiovacular family hisotry     Review Of Systems:     All ROS are negative except per HPI       Allergies    No Known Allergies    Intolerances          PHYSICAL EXAM    ICU Vital Signs Last 24 Hrs  T(C): 37.8 (29 Sep 2023 10:00), Max: 37.8 (29 Sep 2023 09:01)  T(F): 100 (29 Sep 2023 10:00), Max: 100 (29 Sep 2023 09:01)  HR: 90 (29 Sep 2023 10:00) (90 - 128)  BP: 137/66 (29 Sep 2023 10:00) (69/37 - 137/66)  BP(mean): 95 (29 Sep 2023 10:00) (42 - 95)  RR: 14 (29 Sep 2023 10:00) (14 - 14)  SpO2: 100% (29 Sep 2023 10:00) (100% - 100%)    O2 Parameters below as of 29 Sep 2023 10:00  Patient On (Oxygen Delivery Method): ventilator            CONSTITUTIONAL:  NAD    ENT:   ET tube, OG tube    EYES:   pupils equal,   round and reactive to light.    CARDIAC:   Tachy   Regular rhythm.    Heart sounds S1, S2.   No edema    RESPIRATORY:   Double triggering  No wheezing   Normal chest expansion  No use of accessory muscles    GASTROINTESTINAL:  Abdomen soft   Non-tender,   No guarding,   + BS    MUSCULOSKELETAL:   No clubbing, cyanosis    NEUROLOGICAL:   Sedated    SKIN:   Warm and dry    PSYCHIATRIC:   No apparent risk to self or others.                LABS:                          9.9    11.39 )-----------( 436      ( 29 Sep 2023 07:15 )             34.4                                               09-29    138  |  104  |  22<H>  ----------------------------<  210<H>  5.7<H>   |  17  |  1.6<H>    Ca    8.9      29 Sep 2023 07:15  Phos  5.4     09-29  Mg     2.3     09-29    TPro  6.6  /  Alb  3.0<L>  /  TBili  0.4  /  DBili  x   /  AST  67<H>  /  ALT  17  /  AlkPhos  78  09-29      PT/INR - ( 29 Sep 2023 07:15 )   PT: 13.50 sec;   INR: 1.18 ratio         PTT - ( 29 Sep 2023 07:15 )  PTT:27.6 sec                                       Urinalysis Basic - ( 29 Sep 2023 07:15 )    Color: x / Appearance: x / SG: x / pH: x  Gluc: 210 mg/dL / Ketone: x  / Bili: x / Urobili: x   Blood: x / Protein: x / Nitrite: x   Leuk Esterase: x / RBC: x / WBC x   Sq Epi: x / Non Sq Epi: x / Bacteria: x                                                  LIVER FUNCTIONS - ( 29 Sep 2023 07:15 )  Alb: 3.0 g/dL / Pro: 6.6 g/dL / ALK PHOS: 78 U/L / ALT: 17 U/L / AST: 67 U/L / GGT: x                                                                                               Mode: AC/ CMV (Assist Control/ Continuous Mandatory Ventilation)  RR (machine): 14  TV (machine): 450  FiO2: 50  PEEP: 8  ITime: 1  MAP: 6  PIP: 21                                      ABG - ( 29 Sep 2023 09:06 )  pH, Arterial: 7.19  pH, Blood: x     /  pCO2: 55    /  pO2: 188   / HCO3: 21    / Base Excess: -7.5  /  SaO2: 100.0       X-Rays reviewed:                                                                                    ECHO    CXR interpreted by me:    MEDICATIONS  (STANDING):  chlorhexidine 0.12% Liquid 15 milliLiter(s) Oral Mucosa every 12 hours  fentaNYL   Infusion. 0.5 MICROgram(s)/kG/Hr (3 mL/Hr) IV Continuous <Continuous>  meropenem  IVPB 1000 milliGRAM(s) IV Intermittent once  norepinephrine Infusion 0.05 MICROgram(s)/kG/Min (5.63 mL/Hr) IV Continuous <Continuous>  pantoprazole Infusion 8 mG/Hr (10 mL/Hr) IV Continuous <Continuous>  propofol Infusion 10 MICROgram(s)/kG/Min (3.6 mL/Hr) IV Continuous <Continuous>  vasopressin Infusion 0.02 Unit(s)/Min (3 mL/Hr) IV Continuous <Continuous>    MEDICATIONS  (PRN):

## 2023-09-29 NOTE — ED PROVIDER NOTE - PHYSICAL EXAMINATION
CONSTITUTIONAL: Awake, moving extremities, and biting on ET tube. Coffee ground emesis on clothing and stretcher  SKIN: Warm  HEAD: Normocephalic; atraumatic  EYES: EOMI, normal sclera and conjunctiva   ENT: Coffee ground emesis coming from b/l nares and mouth. ET tube with coffee ground emesis inside  CARD:  Regular rate and rhythm. Normal S1, S2. 2+ peripheral pulses. Normal capillary refill.  RESP: Intubated and connected to vent. B/l rhonchi.   ABD: Soft, nontender, nondistended.  EXT: Right leg contracted. Moving extremities spontaneously.   NEURO: Awake

## 2023-09-29 NOTE — CONSULT NOTE ADULT - SUBJECTIVE AND OBJECTIVE BOX
Gastroenterology Consultation:    Patient is a 58y old  Female who presents with a chief complaint of coffee ground emesis      Admitted on: 09-29-23      HPI: 58 female with PMH of cerebral palsy, non verbal, seizure disorder, down syndrome, impulsive control disorder,, multiple pressure ulcers, orthostatic hypotension (on midodrine), constipation, congenital pulmonary stenosis,        Prior EGD:    Prior Colonoscopy:      PAST MEDICAL & SURGICAL HISTORY:  Down syndrome      Osteoporosis      Mild anemia      Neuropathy      S/P debridement  of R hip on 3/2/21      FAMILY HISTORY:      Social History:  Tobacco:  Alcohol:  Drugs:    Home Medications:  gabapentin 600 mg oral tablet: 1 orally once a day (04 Aug 2023 10:55)  melatonin 5 mg oral tablet: 1 tab(s) orally once a day (at bedtime) (04 Aug 2023 11:13)  Multiple Vitamins oral tablet: 1 tab(s) orally once a day (04 Aug 2023 11:13)  raloxifene 60 mg oral tablet: 1 tab(s) orally once a day (04 Aug 2023 11:13)        MEDICATIONS  (STANDING):  clindamycin IVPB 600 milliGRAM(s) IV Intermittent every 8 hours  fentaNYL   Infusion. 0.5 MICROgram(s)/kG/Hr (3 mL/Hr) IV Continuous <Continuous>  midazolam Infusion 0.02 mG/kG/Hr (1.2 mL/Hr) IV Continuous <Continuous>  pantoprazole Infusion 8 mG/Hr (10 mL/Hr) IV Continuous <Continuous>  propofol Infusion 10 MICROgram(s)/kG/Min (3.6 mL/Hr) IV Continuous <Continuous>    MEDICATIONS  (PRN):      Allergies  No Known Allergies      Review of Systems:   Constitutional:  No Fever, No Chills  ENT/Mouth:  No Hearing Changes,  No Difficulty Swallowing  Eyes:  No Eye Pain, No Vision Changes  Cardiovascular:  No Chest Pain, No Palpitations  Respiratory:  No Cough, No Dyspnea  Gastrointestinal:  As described in HPI          Physical Examination:  T(C): 35.6 (09-29-23 @ 09:01), Max: 35.6 (09-29-23 @ 09:01)  HR: 112 (09-29-23 @ 09:01) (112 - 112)  BP: 115/51 (09-29-23 @ 09:01) (115/51 - 115/51)  RR: --  SpO2: --  Height (cm): 142.2 (09-29-23 @ 08:54)  Weight (kg): 60 (09-29-23 @ 08:54)        GENERAL: AAOx3, no acute distress.  HEAD:  Atraumatic, Normocephalic  EYES: conjunctiva and sclera clear  CHEST/LUNG: Clear to auscultation bilaterally; No wheeze, rhonchi, or rales  HEART: Regular rate and rhythm; normal S1, S2, No murmurs.  ABDOMEN: Soft, nontender, nondistended; Bowel sounds present  SKIN: Intact, no jaundice        Data:                        9.9    11.39 )-----------( 436      ( 29 Sep 2023 07:15 )             34.4     Hgb Trend:  9.9  09-29-23 @ 07:15      09-29    138  |  104  |  22<H>  ----------------------------<  210<H>  5.7<H>   |  17  |  1.6<H>    Ca    8.9      29 Sep 2023 07:15  Phos  5.4     09-29  Mg     2.3     09-29    TPro  6.6  /  Alb  3.0<L>  /  TBili  0.4  /  DBili  x   /  AST  67<H>  /  ALT  17  /  AlkPhos  78  09-29    Liver panel trend:  TBili 0.4   /   AST 67   /   ALT 17   /   AlkP 78   /   Tptn 6.6   /   Alb 3.0    /   DBili --      09-29      PT/INR - ( 29 Sep 2023 07:15 )   PT: 13.50 sec;   INR: 1.18 ratio         PTT - ( 29 Sep 2023 07:15 )  PTT:27.6 sec        Radiology:       Gastroenterology Consultation:    Patient is a 58y old  Female who presents with a chief complaint of coffee ground emesis      Admitted on: 09-29-23      HPI: 58 female with PMH of cerebral palsy, non verbal, seizure disorder, down syndrome, impulsive control disorder,, multiple pressure ulcers, orthostatic hypotension (on midodrine), constipation, congenital pulmonary stenosis brought by EMS as she was found hypoxic , unresponsive , dark emesis on the field s/p intubation. In ER, patient was hypotensive, was started on pressors , s/p 2u prbc. GI consulted for dark emesis.        Prior EGD: no documentation    Prior Colonoscopy: no documentation      PAST MEDICAL & SURGICAL HISTORY:  Down syndrome      Osteoporosis      Mild anemia      Neuropathy      S/P debridement  of R hip on 3/2/21      FAMILY HISTORY: unknown      Social History: unknown  Tobacco:  Alcohol:  Drugs:    Home Medications:  gabapentin 600 mg oral tablet: 1 orally once a day (04 Aug 2023 10:55)  melatonin 5 mg oral tablet: 1 tab(s) orally once a day (at bedtime) (04 Aug 2023 11:13)  Multiple Vitamins oral tablet: 1 tab(s) orally once a day (04 Aug 2023 11:13)  raloxifene 60 mg oral tablet: 1 tab(s) orally once a day (04 Aug 2023 11:13)        MEDICATIONS  (STANDING):  clindamycin IVPB 600 milliGRAM(s) IV Intermittent every 8 hours  fentaNYL   Infusion. 0.5 MICROgram(s)/kG/Hr (3 mL/Hr) IV Continuous <Continuous>  midazolam Infusion 0.02 mG/kG/Hr (1.2 mL/Hr) IV Continuous <Continuous>  pantoprazole Infusion 8 mG/Hr (10 mL/Hr) IV Continuous <Continuous>  propofol Infusion 10 MICROgram(s)/kG/Min (3.6 mL/Hr) IV Continuous <Continuous>    MEDICATIONS  (PRN):      Allergies  No Known Allergies      Review of Systems:   Constitutional:  No Fever, No Chills  ENT/Mouth:  No Hearing Changes,  No Difficulty Swallowing  Eyes:  No Eye Pain, No Vision Changes  Cardiovascular:  No Chest Pain, No Palpitations  Respiratory:  No Cough, No Dyspnea  Gastrointestinal:  As described in HPI          Physical Examination:  T(C): 35.6 (09-29-23 @ 09:01), Max: 35.6 (09-29-23 @ 09:01)  HR: 112 (09-29-23 @ 09:01) (112 - 112)  BP: 115/51 (09-29-23 @ 09:01) (115/51 - 115/51)  Height (cm): 142.2 (09-29-23 @ 08:54)  Weight (kg): 60 (09-29-23 @ 08:54)        GENERAL: AAOx0, vented and sedated, no acute distress.  HEAD:  Atraumatic, Normocephalic  EYES: conjunctiva and sclera clear  CHEST/LUNG: Clear to auscultation bilaterally; No wheeze, rhonchi, or rales  HEART: Regular rate and rhythm; normal S1, S2, No murmurs.  ABDOMEN: Soft, nontender, nondistended; Bowel sounds present  SKIN:  no jaundice        Data:                        9.9    11.39 )-----------( 436      ( 29 Sep 2023 07:15 )             34.4     Hgb Trend:  9.9  09-29-23 @ 07:15      09-29    138  |  104  |  22<H>  ----------------------------<  210<H>  5.7<H>   |  17  |  1.6<H>    Ca    8.9      29 Sep 2023 07:15  Phos  5.4     09-29  Mg     2.3     09-29    TPro  6.6  /  Alb  3.0<L>  /  TBili  0.4  /  DBili  x   /  AST  67<H>  /  ALT  17  /  AlkPhos  78  09-29    Liver panel trend:  TBili 0.4   /   AST 67   /   ALT 17   /   AlkP 78   /   Tptn 6.6   /   Alb 3.0    /   DBili --      09-29      PT/INR - ( 29 Sep 2023 07:15 )   PT: 13.50 sec;   INR: 1.18 ratio         PTT - ( 29 Sep 2023 07:15 )  PTT:27.6 sec        Radiology:       Gastroenterology Consultation:    Patient is a 58y old  Female who presents with a chief complaint of coffee ground emesis      Admitted on: 09-29-23      HPI: 58 female with PMH of cerebral palsy, non verbal, seizure disorder, down syndrome, impulsive control disorder,, multiple pressure ulcers, orthostatic hypotension (on midodrine), constipation, congenital pulmonary stenosis brought by EMS as she was found hypoxic , unresponsive , dark emesis on the field s/p intubation. In ER, patient was hypotensive, was started on pressors , s/p 2u prbc. GI consulted for dark emesis.        Prior EGD: no documentation    Prior Colonoscopy: no documentation      PAST MEDICAL & SURGICAL HISTORY:  Down syndrome      Osteoporosis      Mild anemia      Neuropathy      S/P debridement  of R hip on 3/2/21      FAMILY HISTORY: unknown      Social History: unknown  Tobacco:  Alcohol:  Drugs:    Home Medications:  gabapentin 600 mg oral tablet: 1 orally once a day (04 Aug 2023 10:55)  melatonin 5 mg oral tablet: 1 tab(s) orally once a day (at bedtime) (04 Aug 2023 11:13)  Multiple Vitamins oral tablet: 1 tab(s) orally once a day (04 Aug 2023 11:13)  raloxifene 60 mg oral tablet: 1 tab(s) orally once a day (04 Aug 2023 11:13)        MEDICATIONS  (STANDING):  clindamycin IVPB 600 milliGRAM(s) IV Intermittent every 8 hours  fentaNYL   Infusion. 0.5 MICROgram(s)/kG/Hr (3 mL/Hr) IV Continuous <Continuous>  midazolam Infusion 0.02 mG/kG/Hr (1.2 mL/Hr) IV Continuous <Continuous>  pantoprazole Infusion 8 mG/Hr (10 mL/Hr) IV Continuous <Continuous>  propofol Infusion 10 MICROgram(s)/kG/Min (3.6 mL/Hr) IV Continuous <Continuous>    MEDICATIONS  (PRN):      Allergies  No Known Allergies      Review of Systems:   Constitutional:  No Fever, No Chills  ENT/Mouth:  No Hearing Changes,  No Difficulty Swallowing  Eyes:  No Eye Pain, No Vision Changes  Cardiovascular:  No Chest Pain, No Palpitations  Respiratory:  No Cough, No Dyspnea  Gastrointestinal:  As described in HPI          Physical Examination:  T(C): 35.6 (09-29-23 @ 09:01), Max: 35.6 (09-29-23 @ 09:01)  HR: 112 (09-29-23 @ 09:01) (112 - 112)  BP: 115/51 (09-29-23 @ 09:01) (115/51 - 115/51)  Height (cm): 142.2 (09-29-23 @ 08:54)  Weight (kg): 60 (09-29-23 @ 08:54)        GENERAL: AAOx0, vented and sedated, no acute distress.  HEAD:  Atraumatic, Normocephalic  EYES: conjunctiva and sclera clear  CHEST/LUNG: decreased breath sounds  HEART: Regular rate and rhythm; normal S1, S2, No murmurs.  ABDOMEN: Soft, nontender, nondistended; Bowel sounds present  SKIN:  no jaundice        Data:                        9.9    11.39 )-----------( 436      ( 29 Sep 2023 07:15 )             34.4     Hgb Trend:  9.9  09-29-23 @ 07:15      09-29    138  |  104  |  22<H>  ----------------------------<  210<H>  5.7<H>   |  17  |  1.6<H>    Ca    8.9      29 Sep 2023 07:15  Phos  5.4     09-29  Mg     2.3     09-29    TPro  6.6  /  Alb  3.0<L>  /  TBili  0.4  /  DBili  x   /  AST  67<H>  /  ALT  17  /  AlkPhos  78  09-29    Liver panel trend:  TBili 0.4   /   AST 67   /   ALT 17   /   AlkP 78   /   Tptn 6.6   /   Alb 3.0    /   DBili --      09-29      PT/INR - ( 29 Sep 2023 07:15 )   PT: 13.50 sec;   INR: 1.18 ratio         PTT - ( 29 Sep 2023 07:15 )  PTT:27.6 sec        Radiology:  < from: Xray Chest 1 View- PORTABLE-Urgent (Xray Chest 1 View- PORTABLE-Urgent .) (09.29.23 @ 10:46) >    Orogastric tube is coiled within the distal esophagus and stomach and   repositioning is advised.    Xray Chest 1 View-PORTABLE IMMEDIATE (Xray Chest 1 View-PORTABLE IMMEDIATE .) (09.29.23 @ 08:06) >  Bilateral lower lung zone opacities may be secondary to aspiration as   well as pneumonia.    Feeding tube as above.    < end of copied text >       Gastroenterology Consultation:    Patient is a 58y old  Female who presents with a chief complaint of coffee ground emesis      Admitted on: 09-29-23      HPI: 58 female with PMH of cerebral palsy, non verbal, seizure disorder, down syndrome, impulsive control disorder,, multiple pressure ulcers, orthostatic hypotension (on midodrine), constipation, congenital pulmonary stenosis brought by EMS as she was found hypoxic , unresponsive , dark emesis on the field s/p intubation. In ER, patient was hypotensive, was started on pressors , s/p 2u prbc. GI consulted for dark emesis.        Prior EGD: no documentation    Prior Colonoscopy: no documentation      PAST MEDICAL & SURGICAL HISTORY:  Down syndrome      Osteoporosis      Mild anemia      Neuropathy      S/P debridement  of R hip on 3/2/21      FAMILY HISTORY: unknown      Social History: unknown  Tobacco:  Alcohol:  Drugs:    Home Medications:  gabapentin 600 mg oral tablet: 1 orally once a day (04 Aug 2023 10:55)  melatonin 5 mg oral tablet: 1 tab(s) orally once a day (at bedtime) (04 Aug 2023 11:13)  Multiple Vitamins oral tablet: 1 tab(s) orally once a day (04 Aug 2023 11:13)  raloxifene 60 mg oral tablet: 1 tab(s) orally once a day (04 Aug 2023 11:13)        MEDICATIONS  (STANDING):  clindamycin IVPB 600 milliGRAM(s) IV Intermittent every 8 hours  fentaNYL   Infusion. 0.5 MICROgram(s)/kG/Hr (3 mL/Hr) IV Continuous <Continuous>  midazolam Infusion 0.02 mG/kG/Hr (1.2 mL/Hr) IV Continuous <Continuous>  pantoprazole Infusion 8 mG/Hr (10 mL/Hr) IV Continuous <Continuous>  propofol Infusion 10 MICROgram(s)/kG/Min (3.6 mL/Hr) IV Continuous <Continuous>    MEDICATIONS  (PRN):      Allergies  No Known Allergies      Review of Systems:   Not able to obtain        Physical Examination:  T(C): 35.6 (09-29-23 @ 09:01), Max: 35.6 (09-29-23 @ 09:01)  HR: 112 (09-29-23 @ 09:01) (112 - 112)  BP: 115/51 (09-29-23 @ 09:01) (115/51 - 115/51)  Height (cm): 142.2 (09-29-23 @ 08:54)  Weight (kg): 60 (09-29-23 @ 08:54)        GENERAL: AAOx0, vented and sedated, no acute distress.  HEAD:  Atraumatic, Normocephalic  EYES: conjunctiva and sclera clear  CHEST/LUNG: decreased breath sounds  HEART: Regular rate and rhythm; normal S1, S2, No murmurs.  ABDOMEN: Soft, no tenderness elicited, nondistended; Bowel sounds present  SKIN:  no jaundice        Data:                        9.9    11.39 )-----------( 436      ( 29 Sep 2023 07:15 )             34.4     Hgb Trend:  9.9  09-29-23 @ 07:15      09-29    138  |  104  |  22<H>  ----------------------------<  210<H>  5.7<H>   |  17  |  1.6<H>    Ca    8.9      29 Sep 2023 07:15  Phos  5.4     09-29  Mg     2.3     09-29    TPro  6.6  /  Alb  3.0<L>  /  TBili  0.4  /  DBili  x   /  AST  67<H>  /  ALT  17  /  AlkPhos  78  09-29    Liver panel trend:  TBili 0.4   /   AST 67   /   ALT 17   /   AlkP 78   /   Tptn 6.6   /   Alb 3.0    /   DBili --      09-29      PT/INR - ( 29 Sep 2023 07:15 )   PT: 13.50 sec;   INR: 1.18 ratio         PTT - ( 29 Sep 2023 07:15 )  PTT:27.6 sec        Radiology:  < from: Xray Chest 1 View- PORTABLE-Urgent (Xray Chest 1 View- PORTABLE-Urgent .) (09.29.23 @ 10:46) >    Orogastric tube is coiled within the distal esophagus and stomach and   repositioning is advised.    Xray Chest 1 View-PORTABLE IMMEDIATE (Xray Chest 1 View-PORTABLE IMMEDIATE .) (09.29.23 @ 08:06) >  Bilateral lower lung zone opacities may be secondary to aspiration as   well as pneumonia.    Feeding tube as above.    < end of copied text >

## 2023-09-29 NOTE — ED PROVIDER NOTE - CLINICAL SUMMARY MEDICAL DECISION MAKING FREE TEXT BOX
58-year-old woman, history of Down syndrome, cerebral palsy, severe developmental delay, nonverbal at baseline, seizure disorder, lives in a group home, brought in by EMS due to coffee-ground emesis this morning.  EMS found patient hypoxic and hypotensive, and intubated in the field.  On arrival, patient's clothing noted to be covered in emesis with emesis noted in the ET tube and mouth.  ET tube was confirmed by direct visualization.  OG tube placed to suction, about 500 cc coffee-ground, retrieved.  Patient was transfused 2 units uncrossed blood due to hypotension and started on pressors.  Central line placed.  Chest x-ray showed G-tube controlled and patient's hiatal hernia.  Lactate ~6, initial hemoglobin ~9, unclear if hypotension is due to sepsis or bleeding, treated presumptively with fluids and clindamycin as chest x-ray also revealed likely aspiration.  MICU consulted and accepted patient for admission.

## 2023-09-29 NOTE — H&P ADULT - NSHPPHYSICALEXAM_GEN_ALL_CORE
General: Sedated, mechanically ventilated  CV: RRR  Chest: Decreased breath sounds bibasilarly  Abdomen: Soft, no evidence of peritonitis  Extremities: no edema, cyanosis or clubbing  Sacrum: Stage II sacral ulcer

## 2023-09-29 NOTE — CONSULT NOTE ADULT - ASSESSMENT
58F with PMH of Down syndrome, cerebral palsy, severe intellectual disability, nonverbal at baseline, hypothyroidism, congenital pulmonary stenosis, chronic pressure ulcers, orthostatic hypotension, constipation, presents to the ED for coffee ground emesis and AMS. Patient was recently admitted to the hospital for management of pneumonia and resultant hypoxic respiratory failure.  Patient was noted of having hemoptysis when she was admitted previously, a CT of the abdomen showed esophagitis for which GI recommended OP upper endoscopy. On the day of presentation, the patient was less responsive with noted hematemesis When asking the group home personnel, patient was not exhibiting any signs of respiratory distress, abdominal pain, cough, shortness of breath, fevers/ sweating, or any changes in bowel habits. Patient given 2u pRBCs in the ED and intubated for airway protection.    Patient admitted to MICU for presumptive upper GI bleed. Surgery consulted for CT findings of duodenal perforation.    PLAN:  #Contained D3 Perforation   - No acute surgical intervention at this time   - OGT to LCWS   - continue ppi gtt   - Continue IV abx, consider fluconazole for fungal coverage   - Monitor Hgb, transfuse > 7 as needed   - Strict I/O   - F/u GI recs   - Surgery following    Discussed plan with attending surgeon on call, Dr. Covarrubias  SPECTRA [id number]

## 2023-09-29 NOTE — ED ADULT NURSE NOTE - NSFALLRISKINTERV_ED_ALL_ED
Assistance OOB with selected safe patient handling equipment if applicable/Communicate fall risk and risk factors to all staff, patient, and family/Monitor for mental status changes and reorient to person, place, and time, as needed/Move patient closer to nursing station/within visual sight of ED staff/Provide visual cue: yellow wristband, yellow gown, etc/Reinforce activity limits and safety measures with patient and family/Toileting schedule using arm’s reach rule for commode and bathroom/Use of alarms - bed, stretcher, chair and/or video monitoring/Call bell, personal items and telephone in reach/Instruct patient to call for assistance before getting out of bed/chair/stretcher/Non-slip footwear applied when patient is off stretcher/[place] to call system/Physically safe environment - no spills, clutter or unnecessary equipment/Purposeful Proactive Rounding/Room/bathroom lighting operational, light cord in reach

## 2023-09-29 NOTE — CONSULT NOTE ADULT - ASSESSMENT
58 female with PMH of cerebral palsy, non verbal, seizure disorder, down syndrome, impulsive control disorder,, multiple pressure ulcers, orthostatic hypotension (on midodrine), constipation, congenital pulmonary stenosis brought by EMS as she was found hypoxic , unresponsive , dark emesis on the field s/p intubation. In ER, patient was hypotensive, was started on pressors , s/p 2u prbc. GI consulted for dark emesis.    #Dark emesis with hypotension on pressors   #AHRF s/p intubation  - Hb on admission: 9.9 s/p 2u prbc in ER (last Hb in 8/23: 9.3)  - WBC: 11k, Lactate: 6.7>5.7   - INR: 1.18, BUN/Cr: 22/1.6  - not on AC  - on PPI drip  - NG tube to suction showing dark fluid with blood tinge  - CXR: aspiration pna?    PLAN:  - Keep NPO  - recommend CTAP with IV conc per MICU and sepsis work up  - Maintain active Type and screen  - Trend H&H BID  - Please place x2 18G IVs  - Please start IV fluids (SBP >90)   - c/w  pantoprazole 40 IV BID  - Please correct electrolytes (Target Na 135-145, Mg 1.7-2.2, K 3.5-5)  - Please correct INR to <1.5  - Please target Hb  >8  - Please avoid any NSAIDs  - If any unstable bleed, please call GI stat     58 female with PMH of cerebral palsy, non verbal, seizure disorder, down syndrome, impulsive control disorder,, multiple pressure ulcers, orthostatic hypotension (on midodrine), constipation, congenital pulmonary stenosis brought by EMS as she was found hypoxic , unresponsive , dark emesis on the field s/p intubation. In ER, patient was hypotensive, was started on pressors , s/p 2u prbc. GI consulted for dark emesis.    #Dark emesis with hypotension on pressors   #AHRF s/p intubation  - Hb on admission: 9.9 s/p 2u prbc in ER (last Hb in 8/23: 9.3)  - WBC: 11k, Lactate: 6.7>5.7   - INR: 1.18, BUN/Cr: 22/1.6  - not on AC  - on PPI drip  - NG tube to suction showing dark fluid with blood tinge  - CXR: aspiration pna?    PLAN:  - Keep NPO  - recommend CTAP with IV conc per MICU and sepsis work up  - Maintain active Type and screen  - Trend H&H BID  - Please place x2 18G IVs  - Please start IV fluids (SBP >90)   - c/w  protonix  - Please correct electrolytes (Target Na 135-145, Mg 1.7-2.2, K 3.5-5)  - Please correct INR to <1.5  - Please target Hb  >8  - Please avoid any NSAIDs  - If any unstable bleed, please call GI stat     58 female with PMH of cerebral palsy, non verbal, seizure disorder, down syndrome, impulsive control disorder,, multiple pressure ulcers, orthostatic hypotension (on midodrine), constipation, congenital pulmonary stenosis brought by EMS as she was found hypoxic , unresponsive , dark emesis on the field s/p intubation. In ER, patient was hypotensive, was started on pressors , s/p 2u prbc. GI consulted for dark emesis.    #Dark emesis with hypotension on pressors   #AHRF s/p intubation  - Hb on admission: 9.9 s/p 2u prbc in ER (last Hb in 8/23: 9.3)  - WBC: 11k, Lactate: 6.7>5.7   - INR: 1.18, BUN/Cr: 22/1.6  - not on AC  - on PPI drip  - NG tube to suction showing dark fluid with blood tinge  - CXR: aspiration pna?, OG tube coiled in distal esophagus, repositioning recommended  - AN: brown stool  - initially on 1.2 levo now on 0.07    PLAN:  - Keep NPO  - recommend CTAP with IV conc per MICU and sepsis work up, EKG, Troponin workup  - Maintain active Type and screen  - Trend H&H BID  - Please place x2 18G IVs  - Please start IV fluids (SBP >90)   - c/w  protonix  - Please correct electrolytes (Target Na 135-145, Mg 1.7-2.2, K 3.5-5)  - Please correct INR to <1.5  - Please target Hb  >8  - Please avoid any NSAIDs  - If any unstable bleed, please call GI stat

## 2023-09-29 NOTE — H&P ADULT - NSHPLABSRESULTS_GEN_ALL_CORE
LABS:                          9.9    11.39 )-----------( 436      ( 29 Sep 2023 07:15 )             34.4     09-29    141  |  108  |  23<H>  ----------------------------<  213<H>  4.4   |  20  |  1.4    Ca    8.4      29 Sep 2023 11:45  Phos  5.4     09-29  Mg     2.3     09-29    TPro  6.6  /  Alb  3.0<L>  /  TBili  0.4  /  DBili  x   /  AST  67<H>  /  ALT  17  /  AlkPhos  78  09-29    LIVER FUNCTIONS - ( 29 Sep 2023 07:15 )  Alb: 3.0 g/dL / Pro: 6.6 g/dL / ALK PHOS: 78 U/L / ALT: 17 U/L / AST: 67 U/L / GGT: x           PT/INR - ( 29 Sep 2023 07:15 )   PT: 13.50 sec;   INR: 1.18 ratio         PTT - ( 29 Sep 2023 07:15 )  PTT:27.6 sec  Urinalysis Basic - ( 29 Sep 2023 11:45 )    Color: x / Appearance: x / SG: x / pH: x  Gluc: 213 mg/dL / Ketone: x  / Bili: x / Urobili: x   Blood: x / Protein: x / Nitrite: x   Leuk Esterase: x / RBC: x / WBC x   Sq Epi: x / Non Sq Epi: x / Bacteria: x

## 2023-09-29 NOTE — ED PROVIDER NOTE - OBJECTIVE STATEMENT
58-year-old female with PMH CP, Down syndrome, seizure disorder, osteoporosis, anemia brought in by EMS from group [facility] for coffee-ground emesis since this morning per group [facility] staff, hypoxic and altered on EMS arrival so intubated in field.  On presentation in the ED patient clothing covered in coffee-ground emesis, with coffee-ground emesis in ET tube, and coming out of nose and mouth.

## 2023-09-29 NOTE — PATIENT PROFILE ADULT - FALL HARM RISK - PATIENT NEEDS ASSISTANCE
Patient seen in clinic today.
Patient's daughter called and rescheduled her for 12/30.
Standing/Walking/Toileting

## 2023-09-29 NOTE — H&P ADULT - HISTORY OF PRESENT ILLNESS
58 year old female with a PMHx of Down syndrome, nonverbal at baseline, hypothyroidism, cerebral palsy, congenital pulmonary stenosis presents to the ED for hemoptysis.    Patient was recently admitted to the hospital for management of pneumonia and resultant hypoxic respiratory failure.  Patient was noted of having hemoptysis when she was admitted previously, a CT of the abdomen showed esophagitis for which GI recommended OP upper endoscopy.    On the day of presentation, the patient was less responsive with noted hemoptysis.   When asking the group home personnel, patient was not exhibiting any signs of respiratory distress, abdominal pain, cough, shortness of breath, fevers/ sweating, or any changes in bowel habits.    In the ED, the patient was HD stable, afebrile.  Labs workup revealed Hb= 9.9, WBC= 11k, plt= 432, creatinine= 1.6.  pH= 7.19/ CO2= 55/ O2= 188/ 21.    Patient received 2 PRBCs, got intubated for airway protection where blood was noted in the trachea.  A CT of the abdomen showed foci of air in the lumen of the stomach, consistent with duodenal perforation.  The CT showed as well bibasilar infiltrates, consistent with pneumonia.    Surgery were consulted, as well as GI.  Patient was started on Protonix drip, and got admitted for management. 58 year old female with a PMHx of Down syndrome, nonverbal at baseline, hypothyroidism, cerebral palsy, congenital pulmonary stenosis presents to the ED for hemoptysis.    Patient was recently admitted to the hospital for management of pneumonia and resultant hypoxic respiratory failure.  Patient was noted of having hematemesis when she was admitted previously, a CT of the abdomen showed esophagitis for which GI recommended OP upper endoscopy.    On the day of presentation, the patient was less responsive with noted hemoptysis.   When asking the group home personnel, patient was not exhibiting any signs of respiratory distress, abdominal pain, cough, shortness of breath, fevers/ sweating, or any changes in bowel habits.    In the ED, the patient was HD stable, afebrile.  Labs workup revealed Hb= 9.9, WBC= 11k, plt= 432, creatinine= 1.6.  pH= 7.19/ CO2= 55/ O2= 188/ 21.    Patient received 2 PRBCs, got intubated for airway protection where blood was noted in the trachea.  A CT of the abdomen showed foci of air in the lumen of the stomach, consistent with duodenal perforation.  The CT showed as well bibasilar infiltrates, consistent with pneumonia.    Surgery were consulted, as well as GI.  Patient was started on Protonix drip, and got admitted for management.

## 2023-09-29 NOTE — CONSULT NOTE ADULT - ASSESSMENT
IMPRESSION:    Hypovolemic and septic shock  Acute upper GI bleed s/p 2 units pRBC  Severe community acquired pneumonia  Likely aspiration  NELA likely prerenal  H/o CP  H/o seizures      PLAN:    CNS: Sedate to ventilator synchrony. She is double triggering; add fentanyl to the propofol.    HEENT: Oral and ET tube care    PULMONARY: Xray shows b/l opacities. Set TV to 6 cc/kg. Keep PEEP 8 and FiO2 50%. Once she is adequately sedated increase RR to 20. Repeat ABG in 1 hour. HOB @ 45 degrees. Aspiration precautions     CARDIOVASCULAR: Goal directed fluid resuscitation. She is s/p 1 L. Bolus 500cc more. Wean levophed, goal MAP >65. Add vasopressin. Trend trops and lactate. Obtain echo.    GI: Keep NPO. Protonix drip. GI eval appreciated. Obtain CT angiogram of AP.    RENAL: LR at 50. Send UA, urine Cr, urine Na. Follow up lytes. Correct as needed    INFECTIOUS DISEASE: F/u blood cultures, sputum cultures (if able to obtain), expanded RVP, strep pneumo antigen, legionella antigen, MRSA nares, procalcitonin. Ceftriaxone and azithromycin.    HEMATOLOGICAL: Trend cbc q8h. 2 18' IV's. Transfuse if Hgb<7. Maintain active type and screen.  DVT prophylaxis: Seq.    ENDOCRINE:  Follow up FS.  Insulin protocol if needed.    MUSCULOSKELETAL: Bedrest    DISPO: ICU

## 2023-09-29 NOTE — PATIENT PROFILE ADULT - FALL HARM RISK - HARM RISK INTERVENTIONS
Assistance with ambulation/Assistance OOB with selected safe patient handling equipment/Communicate Risk of Fall with Harm to all staff/Discuss with provider need for PT consult/Monitor gait and stability/Reinforce activity limits and safety measures with patient and family/Tailored Fall Risk Interventions/Visual Cue: Yellow wristband and red socks/Bed in lowest position, wheels locked, appropriate side rails in place/Call bell, personal items and telephone in reach/Instruct patient to call for assistance before getting out of bed or chair/Non-slip footwear when patient is out of bed/[place] to call system/Physically safe environment - no spills, clutter or unnecessary equipment/Purposeful Proactive Rounding/Room/bathroom lighting operational, light cord in reach

## 2023-09-29 NOTE — ED PROVIDER NOTE - PROGRESS NOTE DETAILS
Delayed note due to patient care ICU consulted. GI consulted via teams, multiple calls made Delayed note due to patient care: GI at bedside, pt currently not stable for scope, will follow. Recommends sepsis workup Delayed note due to patient care: On patient arrival, patient intubated but with coffee-ground emesis inside ET tube.  Initial vital signs WNL.  ET tube placement confirmed by visualization and SpO2 >90%.  Sedation started with fentanyl and Versed.  OG tube placed with suctioning of coffee-ground emesis.  CXR showing OG tube to superior, repositioned.  After initial resuscitation patient's blood pressure dropped to ~60/40, 2 units uncrossed blood started during central line placement.  Levophed started via central line.

## 2023-09-29 NOTE — CONSULT NOTE ADULT - SUBJECTIVE AND OBJECTIVE BOX
GENERAL SURGERY CONSULT NOTE    Patient: TEA ECHAVARRIA , 58y ([birth year])Female   MRN: [id number]  Location: [address]  Visit: 09-29-23 Inpatient  Date: 09-29-23 @ 14:29    HPI:  58F with PMH of Down syndrome, cerebral palsy, severe intellectual disability, nonverbal at baseline, hypothyroidism, congenital pulmonary stenosis, chronic pressure ulcers, orthostatic hypotension, constipation, presents to the ED for coffee ground emesis and AMS. Patient was recently admitted to the hospital for management of pneumonia and resultant hypoxic respiratory failure.  Patient was noted of having hemoptysis when she was admitted previously, a CT of the abdomen showed esophagitis for which GI recommended OP upper endoscopy. On the day of presentation, the patient was less responsive with noted hematemesis When asking the group home personnel, patient was not exhibiting any signs of respiratory distress, abdominal pain, cough, shortness of breath, fevers/ sweating, or any changes in bowel habits. Patient given 2u pRBCs in the ED and intubated for airway protection.    Patient admitted to MICU for presumptive upper GI bleed. Surgery consulted for CT findings of duodenal perforation.    PAST MEDICAL & SURGICAL HISTORY:  Down syndrome  Osteoporosis  Mild anemia  Neuropathy  S/P debridement  of R hip on 3/2/21    Home Medications:  gabapentin 600 mg oral tablet: 1 orally once a day (29 Sep 2023 11:28)  melatonin 5 mg oral tablet: 1 tab(s) orally once a day (at bedtime) (29 Sep 2023 11:28)  Multiple Vitamins oral tablet: 1 tab(s) orally once a day (29 Sep 2023 11:28)  raloxifene 60 mg oral tablet: 1 tab(s) orally once a day (29 Sep 2023 11:28)    VITALS:  T(F): 99.7 (09-29-23 @ 12:00), Max: 100 (09-29-23 @ 09:01)  HR: 64 (09-29-23 @ 13:30) (63 - 128)  BP: 135/63 (09-29-23 @ 13:30) (69/37 - 142/89)  RR: 17 (09-29-23 @ 13:30) (14 - 27)  SpO2: 98% (09-29-23 @ 13:30) (95% - 100%)    PHYSICAL EXAM:  General: Sedated  HEENT: NCAT, NUVIA, EOMI, Trachea ML, Neck supple, ETT in place  Cardiac: RRR  Respiratory: CTAB, on ventilator  Abdomen: Soft, non-distended  Vascular: Pulses 2+ throughout, extremities well perfused    MEDICATIONS  (STANDING):  chlorhexidine 0.12% Liquid 15 milliLiter(s) Oral Mucosa every 12 hours  fentaNYL   Infusion. 0.5 MICROgram(s)/kG/Hr (3 mL/Hr) IV Continuous <Continuous>  gabapentin 600 milliGRAM(s) Oral daily  lactated ringers. 1000 milliLiter(s) (70 mL/Hr) IV Continuous <Continuous>  meropenem  IVPB 1000 milliGRAM(s) IV Intermittent once  norepinephrine Infusion 0.05 MICROgram(s)/kG/Min (5.63 mL/Hr) IV Continuous <Continuous>  pantoprazole Infusion 8 mG/Hr (10 mL/Hr) IV Continuous <Continuous>  propofol Infusion 10 MICROgram(s)/kG/Min (3.6 mL/Hr) IV Continuous <Continuous>  vasopressin Infusion 0.04 Unit(s)/Min (6 mL/Hr) IV Continuous <Continuous>    MEDICATIONS  (PRN):    LAB/STUDIES:                        9.9    11.39 )-----------( 436      ( 29 Sep 2023 07:15 )             34.4     09-29    141  |  108  |  23<H>  ----------------------------<  213<H>  4.4   |  20  |  1.4    Ca    8.4      29 Sep 2023 11:45  Phos  5.4     09-29  Mg     2.3     09-29    TPro  6.6  /  Alb  3.0<L>  /  TBili  0.4  /  DBili  x   /  AST  67<H>  /  ALT  17  /  AlkPhos  78  09-29    PT/INR - ( 29 Sep 2023 07:15 )   PT: 13.50 sec;   INR: 1.18 ratio        PTT - ( 29 Sep 2023 07:15 )  PTT:27.6 sec  LIVER FUNCTIONS - ( 29 Sep 2023 07:15 )  Alb: 3.0 g/dL / Pro: 6.6 g/dL / ALK PHOS: 78 U/L / ALT: 17 U/L / AST: 67 U/L / GGT: x           Urinalysis Basic - ( 29 Sep 2023 11:45 )    Color: x / Appearance: x / SG: x / pH: x  Gluc: 213 mg/dL / Ketone: x  / Bili: x / Urobili: x   Blood: x / Protein: x / Nitrite: x   Leuk Esterase: x / RBC: x / WBC x   Sq Epi: x / Non Sq Epi: x / Bacteria: x    ABG - ( 29 Sep 2023 14:15 )  pH, Arterial: 7.38  pH, Blood: x     /  pCO2: 34    /  pO2: 95    / HCO3: 20    / Base Excess: -4.2  /  SaO2: 98.7        IMAGING:  < from: CT Abdomen and Pelvis w/ IV Cont (09.29.23 @ 12:44) >  IMPRESSION:    Since August 12, 2023;    1. New foci of extraluminal air along posterior wall of third portion   duodenum, most consistent with perforated ulcer. No evidence for   intraluminal contrast or active gastrointestinal bleeding.    2.Diffuse circumferential wall thickening of the colon and rectum,   nonspecific. Correlate for proctocolitis.    3. Bilateral lower lobe and right middle lobe multifocal pneumonia.    < end of copied text >

## 2023-09-29 NOTE — CONSULT NOTE ADULT - ATTENDING COMMENTS
Ms. Linton was seen and examined today in the medical intensive care unit.  The patient was found unresponsive and covered in blood and was intubated prior to arrival at the hospital.  Patient appears to have had a GI bleed, with bloody contents aspirated from the stomach by NG tube.  Patient was also transiently having severe hypoxemia due to dyssynchrony with the ventilator which has now improved with improved sedation.  By my read of the CT abdomen/pelvis there is no active extravasation of contrast.  Upon alert from radiology, there is a duodenal perforation likely the etiology of her shock.  Emergency consult has been called to surgery, recommend further surgical care per them.  Patient has septic shock secondary to this perforation for which she is on vasopressin and Levophed and is being covered by broad-spectrum antibiotics with meropenem.  Continue all of these as support for possible surgical care moving forward.  I agree with the fellow note, with the exceptions listed in my attestation above.  The remainder of impression and plan per fellow note.
patient seen. intuabted and sedated. on levo and vaso- weaning. abdomen soft. ct shows pna R lung and a couple of dots of air behind third portion of duodenum. possible retroperitoneal duodenal injury. no peritonitis. no current indication for surgery. continue npo, iv fluids. abx.
59yo female h/o Downs syndrome presenting with hypoxia, hypotension and dark emesis requiring intubation. No melena. CT abd/pelvis revealing duodenal perforation. Abd soft, nondistended. Recommend npo, iv ppi, antibiotics and surgery consult.

## 2023-09-29 NOTE — H&P ADULT - ASSESSMENT
IMPRESSION:    Hypovolemic and septic shock  Acute upper GI bleed s/p 2 units pRBC  Pneumonia, in the setting of recent hospitalization  Likely aspiration  NELA likely prerenal  H/o CP  H/o seizures      PLAN:    CNS: Currently on propofol and fentanyl, keep sedated for now with a RASS between 0 and -2    HEENT: Oral and ET tube care    PULMONARY: Xray shows b/l opacities.TV to 6 cc/kg.  Increased rate to 18, ABG showed resolution of the respiratory acidosis, PEEP= 8, wean down FiO2 for a saturation above 92%, on meropenem currently    CARDIOVASCULAR: Goal directed fluid resuscitation. She is s/p 1 L, currently kept on LR at 70 cc/h, Added vasopressin.     GI: Keep NPO. Protonix drip,  CTA showed foci of air in the stomach, consistent with duodenal perforation, surgery and GI on board, recs appreciated    RENAL: LR at 70 cc/h.  Follow up lytes. Correct as needed    INFECTIOUS DISEASE: F/u blood cultures, sputum cultures (if able to obtain), expanded RVP, strep pneumo antigen, legionella antigen, MRSA nares, procalcitonin. On meropenem currently    HEMATOLOGICAL: Trend cbc q8h. 2 18' IV's. Transfuse if Hgb<7. Maintain active type and screen.  DVT prophylaxis: Seq.    ENDOCRINE:  Follow up FS.  Insulin protocol if needed.    MUSCULOSKELETAL: Bedrest    DISPO: ICU

## 2023-09-30 LAB
ALBUMIN SERPL ELPH-MCNC: 2.8 G/DL — LOW (ref 3.5–5.2)
ALP SERPL-CCNC: 80 U/L — SIGNIFICANT CHANGE UP (ref 30–115)
ALT FLD-CCNC: 49 U/L — HIGH (ref 0–41)
ANION GAP SERPL CALC-SCNC: 10 MMOL/L — SIGNIFICANT CHANGE UP (ref 7–14)
AST SERPL-CCNC: 78 U/L — HIGH (ref 0–41)
BASE EXCESS BLDA CALC-SCNC: 0.3 MMOL/L — SIGNIFICANT CHANGE UP (ref -2–3)
BASOPHILS # BLD AUTO: 0.07 K/UL — SIGNIFICANT CHANGE UP (ref 0–0.2)
BASOPHILS NFR BLD AUTO: 0.4 % — SIGNIFICANT CHANGE UP (ref 0–1)
BILIRUB SERPL-MCNC: 0.8 MG/DL — SIGNIFICANT CHANGE UP (ref 0.2–1.2)
BUN SERPL-MCNC: 20 MG/DL — SIGNIFICANT CHANGE UP (ref 10–20)
CALCIUM SERPL-MCNC: 8.2 MG/DL — LOW (ref 8.4–10.5)
CHLORIDE SERPL-SCNC: 106 MMOL/L — SIGNIFICANT CHANGE UP (ref 98–110)
CO2 SERPL-SCNC: 22 MMOL/L — SIGNIFICANT CHANGE UP (ref 17–32)
CREAT SERPL-MCNC: 1.1 MG/DL — SIGNIFICANT CHANGE UP (ref 0.7–1.5)
EGFR: 59 ML/MIN/1.73M2 — LOW
EOSINOPHIL # BLD AUTO: 0.03 K/UL — SIGNIFICANT CHANGE UP (ref 0–0.7)
EOSINOPHIL NFR BLD AUTO: 0.2 % — SIGNIFICANT CHANGE UP (ref 0–8)
GLUCOSE SERPL-MCNC: 195 MG/DL — HIGH (ref 70–99)
GRAM STN FLD: SIGNIFICANT CHANGE UP
HCO3 BLDA-SCNC: 23 MMOL/L — SIGNIFICANT CHANGE UP (ref 21–28)
HCT VFR BLD CALC: 38.2 % — SIGNIFICANT CHANGE UP (ref 37–47)
HGB BLD-MCNC: 12 G/DL — SIGNIFICANT CHANGE UP (ref 12–16)
IMM GRANULOCYTES NFR BLD AUTO: 0.5 % — HIGH (ref 0.1–0.3)
LACTATE SERPL-SCNC: 3.2 MMOL/L — HIGH (ref 0.7–2)
LYMPHOCYTES # BLD AUTO: 1.44 K/UL — SIGNIFICANT CHANGE UP (ref 1.2–3.4)
LYMPHOCYTES # BLD AUTO: 9 % — LOW (ref 20.5–51.1)
MAGNESIUM SERPL-MCNC: 1.8 MG/DL — SIGNIFICANT CHANGE UP (ref 1.8–2.4)
MCHC RBC-ENTMCNC: 22.9 PG — LOW (ref 27–31)
MCHC RBC-ENTMCNC: 31.4 G/DL — LOW (ref 32–37)
MCV RBC AUTO: 73 FL — LOW (ref 81–99)
MONOCYTES # BLD AUTO: 0.67 K/UL — HIGH (ref 0.1–0.6)
MONOCYTES NFR BLD AUTO: 4.2 % — SIGNIFICANT CHANGE UP (ref 1.7–9.3)
NEUTROPHILS # BLD AUTO: 13.63 K/UL — HIGH (ref 1.4–6.5)
NEUTROPHILS NFR BLD AUTO: 85.7 % — HIGH (ref 42.2–75.2)
NRBC # BLD: 0 /100 WBCS — SIGNIFICANT CHANGE UP (ref 0–0)
PCO2 BLDA: 32 MMHG — SIGNIFICANT CHANGE UP (ref 25–48)
PH BLDA: 7.47 — HIGH (ref 7.35–7.45)
PLATELET # BLD AUTO: 268 K/UL — SIGNIFICANT CHANGE UP (ref 130–400)
PMV BLD: 9.7 FL — SIGNIFICANT CHANGE UP (ref 7.4–10.4)
PO2 BLDA: 126 MMHG — HIGH (ref 83–108)
POTASSIUM SERPL-MCNC: 4.6 MMOL/L — SIGNIFICANT CHANGE UP (ref 3.5–5)
POTASSIUM SERPL-SCNC: 4.6 MMOL/L — SIGNIFICANT CHANGE UP (ref 3.5–5)
PROT SERPL-MCNC: 5.9 G/DL — LOW (ref 6–8)
RBC # BLD: 5.23 M/UL — SIGNIFICANT CHANGE UP (ref 4.2–5.4)
RBC # FLD: 17.1 % — HIGH (ref 11.5–14.5)
SAO2 % BLDA: 99.5 % — HIGH (ref 94–98)
SODIUM SERPL-SCNC: 138 MMOL/L — SIGNIFICANT CHANGE UP (ref 135–146)
TROPONIN T SERPL-MCNC: 0.34 NG/ML — CRITICAL HIGH
WBC # BLD: 15.92 K/UL — HIGH (ref 4.8–10.8)
WBC # FLD AUTO: 15.92 K/UL — HIGH (ref 4.8–10.8)

## 2023-09-30 PROCEDURE — 93306 TTE W/DOPPLER COMPLETE: CPT | Mod: 26

## 2023-09-30 PROCEDURE — 93010 ELECTROCARDIOGRAM REPORT: CPT

## 2023-09-30 PROCEDURE — 99291 CRITICAL CARE FIRST HOUR: CPT

## 2023-09-30 PROCEDURE — 71045 X-RAY EXAM CHEST 1 VIEW: CPT | Mod: 26

## 2023-09-30 PROCEDURE — 99232 SBSQ HOSP IP/OBS MODERATE 35: CPT

## 2023-09-30 RX ORDER — SODIUM CHLORIDE 9 MG/ML
250 INJECTION INTRAMUSCULAR; INTRAVENOUS; SUBCUTANEOUS ONCE
Refills: 0 | Status: COMPLETED | OUTPATIENT
Start: 2023-09-30 | End: 2023-09-30

## 2023-09-30 RX ORDER — ACETAMINOPHEN 500 MG
650 TABLET ORAL ONCE
Refills: 0 | Status: COMPLETED | OUTPATIENT
Start: 2023-09-30 | End: 2023-09-30

## 2023-09-30 RX ORDER — PIPERACILLIN AND TAZOBACTAM 4; .5 G/20ML; G/20ML
3.38 INJECTION, POWDER, LYOPHILIZED, FOR SOLUTION INTRAVENOUS EVERY 6 HOURS
Refills: 0 | Status: DISCONTINUED | OUTPATIENT
Start: 2023-09-30 | End: 2023-10-01

## 2023-09-30 RX ORDER — FENTANYL CITRATE 50 UG/ML
0.5 INJECTION INTRAVENOUS
Qty: 2500 | Refills: 0 | Status: DISCONTINUED | OUTPATIENT
Start: 2023-09-30 | End: 2023-10-02

## 2023-09-30 RX ORDER — PANTOPRAZOLE SODIUM 20 MG/1
40 TABLET, DELAYED RELEASE ORAL
Refills: 0 | Status: DISCONTINUED | OUTPATIENT
Start: 2023-09-30 | End: 2023-10-13

## 2023-09-30 RX ORDER — NOREPINEPHRINE BITARTRATE/D5W 8 MG/250ML
0.05 PLASTIC BAG, INJECTION (ML) INTRAVENOUS
Qty: 8 | Refills: 0 | Status: DISCONTINUED | OUTPATIENT
Start: 2023-09-30 | End: 2023-10-07

## 2023-09-30 RX ORDER — AZITHROMYCIN 500 MG/1
500 TABLET, FILM COATED ORAL EVERY 24 HOURS
Refills: 0 | Status: DISCONTINUED | OUTPATIENT
Start: 2023-09-30 | End: 2023-09-30

## 2023-09-30 RX ADMIN — Medication 650 MILLIGRAM(S): at 05:16

## 2023-09-30 RX ADMIN — Medication 650 MILLIGRAM(S): at 05:46

## 2023-09-30 RX ADMIN — MEROPENEM 100 MILLIGRAM(S): 1 INJECTION INTRAVENOUS at 05:08

## 2023-09-30 RX ADMIN — SODIUM CHLORIDE 1000 MILLILITER(S): 9 INJECTION INTRAMUSCULAR; INTRAVENOUS; SUBCUTANEOUS at 12:06

## 2023-09-30 RX ADMIN — CHLORHEXIDINE GLUCONATE 15 MILLILITER(S): 213 SOLUTION TOPICAL at 17:16

## 2023-09-30 RX ADMIN — PIPERACILLIN AND TAZOBACTAM 25 GRAM(S): 4; .5 INJECTION, POWDER, LYOPHILIZED, FOR SOLUTION INTRAVENOUS at 12:06

## 2023-09-30 RX ADMIN — CHLORHEXIDINE GLUCONATE 1 APPLICATION(S): 213 SOLUTION TOPICAL at 05:13

## 2023-09-30 RX ADMIN — PIPERACILLIN AND TAZOBACTAM 25 GRAM(S): 4; .5 INJECTION, POWDER, LYOPHILIZED, FOR SOLUTION INTRAVENOUS at 17:16

## 2023-09-30 RX ADMIN — PANTOPRAZOLE SODIUM 40 MILLIGRAM(S): 20 TABLET, DELAYED RELEASE ORAL at 17:16

## 2023-09-30 RX ADMIN — CHLORHEXIDINE GLUCONATE 15 MILLILITER(S): 213 SOLUTION TOPICAL at 05:12

## 2023-09-30 NOTE — DIETITIAN INITIAL EVALUATION ADULT - OTHER INFO
Pertinent Medical Information: Per H&P, patient is a 59 y/o female with PMHx of Down syndrome, nonverbal at baseline, hypothyroidism, cerebral palsy, congenital pulmonary stenosis presents to the ED for hemoptysis. Patient was recently admitted to the hospital for management of pneumonia and resultant hypoxic respiratory failure. Patient was noted of having hematemesis when she was admitted previously, a CT of the abdomen showed esophagitis for which GI recommended OP upper endoscopy. On the day of presentation, the patient was less responsive with noted hemoptysis.     Weight hx: UBW unknown. Current dosing weight is 55.3 KG. Previous admission weight was    Pertinent Medical Information: Per H&P, patient is a 59 y/o female with PMHx of Down syndrome, nonverbal at baseline, hypothyroidism, cerebral palsy, congenital pulmonary stenosis presents to the ED for hemoptysis. Patient was recently admitted to the hospital for management of pneumonia and resultant hypoxic respiratory failure. Patient was noted of having hematemesis when she was admitted previously, a CT of the abdomen showed esophagitis for which GI recommended OP upper endoscopy. On the day of presentation, the patient was less responsive with noted hemoptysis.     Weight hx: UBW unknown. Current dosing weight is 55.3 KG. Previous admission weight was 52.1 KG on 8/15/23; 5.7% unintentional weight loss in over 2 weeks compared to current dosing weight. Pt meets weight criteria for malnutrition at this time.

## 2023-09-30 NOTE — DIETITIAN INITIAL EVALUATION ADULT - NSFNSPHYEXAMSKINFT_GEN_A_CORE
Pressure Injury 1: Left:, buttock, Stage II  Pressure Injury 2: Right:, buttock, Stage II  Pressure Injury 3: Right:, foot 2nd digit toe, Unstageable  Pressure Injury 4: Right:, bunion, Unstageable  Pressure Injury 5: Right:, foot sole, Unstageable

## 2023-09-30 NOTE — PROGRESS NOTE ADULT - SUBJECTIVE AND OBJECTIVE BOX
GENERAL SURGERY PROGRESS NOTE     TEA ECHAVARRIA  [address] [place] [address] day :1d    POD:  Procedure:   Surgical Attending: Jean Pierre Jacinto  Overnight events: intubated and sedated on vent 50/8. lactate and WBC trending down, MRSA negative, troponin negative, HGB 11.8(12.5)    T(F): 99.9 (09-30-23 @ 00:00), Max: 100.2 (09-29-23 @ 16:00)  HR: 115 (09-30-23 @ 03:45) (45 - 128)  BP: 125/72 (09-30-23 @ 03:45) (69/37 - 142/89)  ABP: 83/76 (09-29-23 @ 15:30) (82/75 - 154/80)  ABP(mean): 79 (09-29-23 @ 15:30) (79 - 110)  RR: 21 (09-30-23 @ 03:45) (14 - 27)  SpO2: 78% (09-30-23 @ 03:45) (78% - 100%)    IN'S / OUT's:    09-29-23 @ 07:01  -  09-30-23 @ 04:17  --------------------------------------------------------  IN:    FentaNYL: 24 mL    IV PiggyBack: 400 mL    Lactated Ringers: 560 mL    Norepinephrine: 409.5 mL    Pantoprazole: 30 mL    Pantoprazole: 50 mL    Propofol: 86.4 mL    Vasopressin: 42 mL  Total IN: 1601.9 mL    OUT:    Voided (mL): 450 mL  Total OUT: 450 mL    Total NET: 1151.9 mL          PHYSICAL EXAM:  GENERAL: NAD   CHEST/LUNG: Clear to auscultation bilaterally. vent 50/8  HEART: Regular rate and rhythm  ABDOMEN: Soft, Nontender, Nondistended;   EXTREMITIES:  No clubbing, cyanosis, or edema      LABS  Labs:  CAPILLARY BLOOD GLUCOSE                              11.8   15.21 )-----------( 244      ( 29 Sep 2023 23:30 )             36.9       Auto Neutrophil %: 83.2 % (09-29-23 @ 23:30)  Auto Immature Granulocyte %: 0.7 % (09-29-23 @ 23:30)  Auto Neutrophil %: 85.7 % (09-29-23 @ 07:15)  Auto Immature Granulocyte %: 0.4 % (09-29-23 @ 07:15)    09-29    136  |  103  |  22<H>  ----------------------------<  285<H>  4.1   |  18  |  1.4      Calcium: 8.2 mg/dL (09-29-23 @ 20:02)      LFTs:             6.6  | 0.4  | 67       ------------------[78      ( 29 Sep 2023 07:15 )  3.0  | x    | 17          Lipase:x      Amylase:x         Lactate, Blood: 5.3 mmol/L (09-29-23 @ 20:42)  Blood Gas Arterial, Lactate: 4.20 mmol/L (09-29-23 @ 14:15)  Blood Gas Arterial, Lactate: 5.70 mmol/L (09-29-23 @ 09:06)  Blood Gas Venous - Lactate: 6.70 mmol/L (09-29-23 @ 07:27)    ABG - ( 29 Sep 2023 14:15 )  pH: 7.38  /  pCO2: 34    /  pO2: 95    / HCO3: 20    / Base Excess: -4.2  /  SaO2: 98.7            ABG - ( 29 Sep 2023 09:06 )  pH: 7.19  /  pCO2: 55    /  pO2: 188   / HCO3: 21    / Base Excess: -7.5  /  SaO2: 100.0             Coags:     13.50  ----< 1.18    ( 29 Sep 2023 07:15 )     27.6        CARDIAC MARKERS ( 29 Sep 2023 16:00 )  x     / <0.01 ng/mL / x     / x     / x              Urinalysis Basic - ( 29 Sep 2023 20:02 )    Color: x / Appearance: x / SG: x / pH: x  Gluc: 285 mg/dL / Ketone: x  / Bili: x / Urobili: x   Blood: x / Protein: x / Nitrite: x   Leuk Esterase: x / RBC: x / WBC x   Sq Epi: x / Non Sq Epi: x / Bacteria: x            RADIOLOGY & ADDITIONAL TESTS:      A/P:  TEA ECHAVARRIA is a 58F with PMH of Down syndrome, cerebral palsy, severe intellectual disability, nonverbal at baseline, hypothyroidism, congenital pulmonary stenosis, chronic pressure ulcers, orthostatic hypotension, constipation, presents to the ED for coffee ground emesis and AMS. Patient was recently admitted to the hospital for management of pneumonia and resultant hypoxic respiratory failure. Patient admitted to MICU for presumptive upper GI bleed. Surgery consulted for CT findings of duodenal perforation.    PLAN:  #Contained D3 Perforation  -per GI- recommend CTAP with IV conc per MICU and sepsis work up, EKG, Troponin workup  - Maintain active Type and screen  - Monitor Hgb, transfuse > 7 as needed  - Trend H&H BID  - intubated and sedated- vent 50/8    - No acute surgical intervention at this time   - Monitor OG tube outputs-   - continue ppi gtt   - Continue IV abx,- on meropenam    - Strict I/O   - Surgery following    Disposition:     Above plan discussed with Attending Surgeon Dr. Covarrubias  , patient, patient family, and Primary team      TAP (Trauma, Acute care, Pediatrics) Spectra [phone]

## 2023-09-30 NOTE — CONSULT NOTE ADULT - ASSESSMENT
58 year old female with a PMHx of Down syndrome, nonverbal at baseline, hypothyroidism, cerebral palsy, congenital pulmonary stenosis presents to the ED for hemoptysis.    ID is consulted for PNA  Recently admitted for UTI with ESBL E. coli and asp PNA, completed 7 days of meropenem  Febrile to 100.4 on admission, with leukocytosis  Lactic acidosis  NELA  BCx x 2 pending  UCx pending  CT showed new foci of extraluminal air along posterior wall of third portion   duodenum, most consistent with perforated ulcer; Bilateral lower lobe and right middle lobe multifocal pneumonia.    Antibiotics:  Meropenem 9/29 - 9/30  Azithromycin 9/29  Clindamycin 9/29      IMPRESSION:  Multifocal pneumonia, likely aspiration  Duodenal perforation  Transaminitis, hepatocellular  Lactic acidosis    RECOMMENDATIONS:      * THIS IS AN INCOMPLETE NOTE. FINAL RECOMMENDATION IS PENDING *   58 year old female with a PMHx of Down syndrome, nonverbal at baseline, hypothyroidism, cerebral palsy, congenital pulmonary stenosis presents to the ED for hemoptysis.    ID is consulted for PNA  Recently admitted for UTI with ESBL E. coli and asp PNA, completed 7 days of meropenem  Febrile to 100.4 on admission, with leukocytosis  Lactic acidosis  NELA  BCx x 2 pending  UCx pending  CT showed new foci of extraluminal air along posterior wall of third portion   duodenum, most consistent with perforated ulcer; Bilateral lower lobe and right middle lobe multifocal pneumonia.    Antibiotics:  Meropenem 9/29 - 9/30  Azithromycin 9/29  Clindamycin 9/29      IMPRESSION:  Multifocal pneumonia, likely aspiration  Duodenal perforation  Transaminitis, hepatocellular  Lactic acidosis  Leukocytosis    RECOMMENDATIONS:  - D/C Zosyn, start IV meropenem 1gm q12hrs (CrCl ~49)  - Reasonable to keep IV Levaquin for severe pneumonia, decrease to 750mg q48hrs for now  - Sputum Cx  - Follow up BCx  - Surgery follow up  - Trend WBC, fever curve, transaminases, creatinine daily  - Will continue to follow      Renee Ritchie D.O.  Attending Physician  Division of Infectious Diseases  [facility]  Please contact me via [employer] Teams

## 2023-09-30 NOTE — DIETITIAN INITIAL EVALUATION ADULT - OTHER CALCULATIONS
Using ABW: ENERGY: PSE 1218.17 (Ve 6.59, Tmax 37.2 C) vs. 5566-0161 kcal/day (20-25 kcal/kg); PROTEIN: 72-88 g/day (1.3-1.6 g/kg); FLUID: 1 mL/kcal -- with consideration for age, BMI, critical illness, multiple PIs, Propofol: 10.8 mL/hr - 285 kcal additional from fat

## 2023-09-30 NOTE — PROGRESS NOTE ADULT - SUBJECTIVE AND OBJECTIVE BOX
Gastroenterology progress note:     Patient is a 58y old  Female who presents with a chief complaint of Upper GI bleed (30 Sep 2023 10:58)       Admitted on: 09-29-23    We are following the patient for: coffee ground emesis       Interval History: minimal output today, still on levo 0.3, protonix drip, no BM yet per RN, on fentaly drip    No acute events overnight.         PAST MEDICAL & SURGICAL HISTORY:  Down syndrome      Osteoporosis      Mild anemia      Neuropathy      S/P debridement  of R hip on 3/2/21          MEDICATIONS  (STANDING):  chlorhexidine 0.12% Liquid 15 milliLiter(s) Oral Mucosa every 12 hours  chlorhexidine 2% Cloths 1 Application(s) Topical <User Schedule>  fentaNYL   Infusion. 0.5 MICROgram(s)/kG/Hr (2.77 mL/Hr) IV Continuous <Continuous>  gabapentin 600 milliGRAM(s) Oral daily  lactated ringers. 1000 milliLiter(s) (70 mL/Hr) IV Continuous <Continuous>  levoFLOXacin IVPB 750 milliGRAM(s) IV Intermittent every 24 hours  norepinephrine Infusion 0.05 MICROgram(s)/kG/Min (2.59 mL/Hr) IV Continuous <Continuous>  pantoprazole  Injectable 40 milliGRAM(s) IV Push two times a day  piperacillin/tazobactam IVPB.. 3.375 Gram(s) IV Intermittent every 6 hours  propofol Infusion 10 MICROgram(s)/kG/Min (3.6 mL/Hr) IV Continuous <Continuous>  sodium chloride 0.9% Bolus 250 milliLiter(s) IV Bolus once  vasopressin Infusion 0.04 Unit(s)/Min (6 mL/Hr) IV Continuous <Continuous>    MEDICATIONS  (PRN):      Allergies  No Known Allergies      Review of Systems:   Cardiovascular:  No Chest Pain, No Palpitations  Respiratory:  No Cough, No Dyspnea  Gastrointestinal:  As described in HPI  Skin:  No Skin Lesions, No Jaundice  Neuro:  No Syncope, No Dizziness    Physical Examination:  T(C): 37.2 (09-30-23 @ 08:00), Max: 38 (09-30-23 @ 04:00)  HR: 74 (09-30-23 @ 11:15) (45 - 145)  BP: 111/56 (09-30-23 @ 11:15) (89/42 - 142/89)  RR: 18 (09-30-23 @ 11:15) (14 - 27)  SpO2: 97% (09-30-23 @ 11:15) (95% - 100%)  Weight (kg): 55.3 (09-29-23 @ 12:00)    09-29-23 @ 07:01  -  09-30-23 @ 07:00  --------------------------------------------------------  IN: 2774.9 mL / OUT: 845 mL / NET: 1929.9 mL    09-30-23 @ 07:01  -  09-30-23 @ 11:23  --------------------------------------------------------  IN: 614.4 mL / OUT: 95 mL / NET: 519.4 mL        GENERAL: AAOx0, no acute distress., awake to painful stimuli  HEAD:  Atraumatic, Normocephalic  EYES: conjunctiva and sclera clear  NECK: Supple, no JVD or thyromegaly  CHEST/LUNG: Clear to auscultation bilaterally; No wheeze, rhonchi, or rales  HEART: Regular rate and rhythm; normal S1, S2, No murmurs.  ABDOMEN: Soft, nontender, nondistended; Bowel sounds present  NEUROLOGY: No asterixis or tremor.   SKIN: Intact, no jaundice     Data:                        12.0   15.92 )-----------( 268      ( 30 Sep 2023 04:40 )             38.2     Hgb trend:  12.0  09-30-23 @ 04:40  11.8  09-29-23 @ 23:30  12.5  09-29-23 @ 20:02  14.7  09-29-23 @ 16:00  9.9  09-29-23 @ 07:15        09-30    138  |  106  |  20  ----------------------------<  195<H>  4.6   |  22  |  1.1    Ca    8.2<L>      30 Sep 2023 04:40  Phos  5.4     09-29  Mg     1.8     09-30    TPro  5.9<L>  /  Alb  2.8<L>  /  TBili  0.8  /  DBili  x   /  AST  78<H>  /  ALT  49<H>  /  AlkPhos  80  09-30    Liver panel trend:  TBili 0.8   /   AST 78   /   ALT 49   /   AlkP 80   /   Tptn 5.9   /   Alb 2.8    /   DBili --      09-30  TBili 0.4   /   AST 67   /   ALT 17   /   AlkP 78   /   Tptn 6.6   /   Alb 3.0    /   DBili --      09-29      PT/INR - ( 29 Sep 2023 07:15 )   PT: 13.50 sec;   INR: 1.18 ratio         PTT - ( 29 Sep 2023 07:15 )  PTT:27.6 sec       Radiology:  CT Abdomen and Pelvis w/ IV Cont:   ACC: [id number] EXAM:  CT ABDOMEN AND PELVIS IC   ORDERED BY: CANDY PRAJAPATI     *** ADDENDUM # 1 ***    Dr. Booth from ICU was made aware of findings 9/29/23 at 1:08 pm    --- End of Report ---    *** END OF ADDENDUM # 1 ***      PROCEDURE DATE: 09/29/2023          INTERPRETATION:  CLINICAL STATEMENT: Upper gastrointestinal bleeding    TECHNIQUE: Contiguous axial CT images were obtained from the lower chest   to the pubic symphysis without and with intravenous contrast.  100 cc   administered of Omnipaque 350 (0 cc discarded).  Oral contrast was not   administered. Examination was performed utilizing gastrointestinal   bleeding protocol with arterial and delayed portal venous phase imaging   Reformatted images in the coronal and sagittal planes were acquired.    COMPARISON CT: August 12, 2023    OTHER STUDIES USED FOR CORRELATION: None.      FINDINGS:    LOWER CHEST: Bilateral lower lobe and right middle lobe airspace   consolidations with air bronchograms.    HEPATOBILIARY: Cholelithiasis with pericholecystic fluid/edema. No   biliary ductal dilatation. Nonspecific periportal edema. Patent portal   vein.    SPLEEN: Unremarkable.    PANCREAS: Unremarkable.    ADRENAL GLANDS: Unremarkable.    KIDNEYS: Symmetric renal enhancement without hydronephrosis bilaterally.   Foci of air within the right upper pole (series 6 image 289), new from   prior.    ABDOMINOPELVIC NODES: Unremarkable.    PELVIC ORGANS: Madsen catheter within urinary bladder. Air within urinary   bladder, nonspecific.    PERITONEUM/MESENTERY/BOWEL: Foci of extraluminal air along posterior wall   of third portion duodenum (series 6 image 305), new from prior. No bowel   obstruction. Diffuse circumferential wall thickening of the colon and   rectum, nonspecific. No intraluminal contrast. Feeding tube in stomach..    BONES/SOFT TISSUES: Degenerative changes of the spine. No acute osseous   abnormality..    OTHER: Left common femoral vein central venous catheter. Atherosclerosis   abdominal aorta and branches.      IMPRESSION:    Since August 12, 2023;    1. New foci of extraluminal air along posterior wall of third portion   duodenum, most consistent with perforated ulcer. No evidence for   intraluminal contrast or active gastrointestinal bleeding.    2.Diffuse circumferential wall thickening of the colon and rectum,   nonspecific. Correlate for proctocolitis.    3. Bilateral lower lobe and right middle lobe multifocal pneumonia.    --- End of Report ---    ***Please see the addendum at the top of this report. It may contain   additional important information or changes.****        ELLIOT LANDAU MD; Attending Radiologist  This document has been electronically signed. Sep 29 2023  1:01PM  1st Addendum: ELLIOT LANDAU MD; Attending Radiologist  Thefirst addendum was electronically signed on: Sep 29 2023  1:08PM. (09-29-23 @ 12:44)

## 2023-09-30 NOTE — DIETITIAN INITIAL EVALUATION ADULT - PERTINENT LABORATORY DATA
09-30    138  |  106  |  20  ----------------------------<  195<H>  4.6   |  22  |  1.1    Ca    8.2<L>      30 Sep 2023 04:40  Phos  5.4     09-29  Mg     1.8     09-30    TPro  5.9<L>  /  Alb  2.8<L>  /  TBili  0.8  /  DBili  x   /  AST  78<H>  /  ALT  49<H>  /  AlkPhos  80  09-30

## 2023-09-30 NOTE — PROGRESS NOTE ADULT - ASSESSMENT
58 year old female with a PMHx of Down syndrome, nonverbal at baseline, hypothyroidism, cerebral palsy, congenital pulmonary stenosis presents to the ED for hemoptysis.    Hypovolemic and septic shock  Acute upper GI bleed s/p 2 units pRBC  Pneumonia, in the setting of recent hospitalization  Likely aspiration  NELA likely prerenal  H/o CP  H/o seizures      PLAN:    CNS: Currently on propofol and fentanyl, keep sedated for now with a RASS between 0 and -2    HEENT: Oral and ET tube care    PULMONARY: Xray shows b/l opacities.TV to 6 cc/kg.  Increased rate to 18, ABG showed resolution of the respiratory acidosis, PEEP= 8, wean down FiO2 for a saturation above 92%, on meropenem currently    CARDIOVASCULAR: Goal directed fluid resuscitation. She is s/p 1 L, currently kept on LR at 70 cc/h, Added vasopressin.     GI: Keep NPO. Protonix drip,  CTA showed foci of air in the stomach, consistent with duodenal perforation, surgery and GI on board, recs appreciated    RENAL: LR at 70 cc/h.  Follow up lytes. Correct as needed    INFECTIOUS DISEASE: F/u blood cultures, sputum cultures (if able to obtain), expanded RVP, strep pneumo antigen, legionella antigen, MRSA nares, procalcitonin. On meropenem currently    HEMATOLOGICAL: Trend cbc q8h. 2 18' IV's. Transfuse if Hgb<7. Maintain active type and screen.  DVT prophylaxis: Seq.    ENDOCRINE:  Follow up FS.  Insulin protocol if needed.    - Activity: Bedrest       58 year old female with a PMHx of Down syndrome, nonverbal at baseline, hypothyroidism, cerebral palsy, congenital pulmonary stenosis presents to the ED for hemoptysis.    #Hypovolemic and septic shock  #Acute upper GI bleed s/p 2 units pRBC  #Possible perforation sp surgical evaluation  - CTA: foci of air in the stomach, consistent with duodenal perforation  - Hb 9.9, s/p 2u pRBC, Hb 14.7 > 12  - Surgery: no surgical intervention at this time  - wean levophed if possible  - LR @ 70  - Trend lactate  - Monitor CBC  - Echo f/u  - c/w protonix BID    #Pneumonia, in the setting of recent hospitalization  #Likely aspiration  #Acute hypoxemic respiratory failure   - CXR b/l opacities  - ABG respiratory acidosis  - MRSA negative  - f/u BCx  - wean down FiO2 for a saturation above 92%  - c/w propofol and fentanyl, keep sedated for now  - started Zosyn + Levaquin      #NELA likely prerenal, resolved  - Cr 1.6 > 1.2 > 1.1  - voiding trial      #NSTEMI likely type II  - EKG Sinus tachycardia, ST elevation, consider early repolarization  - Echo f/u  - Trend trop: 0.34      - DVT ppx: SCD  - Diet: NPO  - Activity: Bedrest

## 2023-09-30 NOTE — PROGRESS NOTE ADULT - SUBJECTIVE AND OBJECTIVE BOX
24H events:    Patient is a 58y old Female who presents with a chief complaint of Upper GI bleed (30 Sep 2023 11:23)    Primary diagnosis of GI bleed    Today is hospital day 1d. This morning patient was seen and examined at bedside, sedated and on vent.   No acute or major events overnight.    Code Status: Full    PAST MEDICAL & SURGICAL HISTORY  Down syndrome    Osteoporosis    Mild anemia    Neuropathy    S/P debridement  of R hip on 3/2/21    ALLERGIES:  No Known Allergies    MEDICATIONS:  STANDING MEDICATIONS  chlorhexidine 0.12% Liquid 15 milliLiter(s) Oral Mucosa every 12 hours  chlorhexidine 2% Cloths 1 Application(s) Topical <User Schedule>  fentaNYL   Infusion. 0.5 MICROgram(s)/kG/Hr IV Continuous <Continuous>  gabapentin 600 milliGRAM(s) Oral daily  lactated ringers. 1000 milliLiter(s) IV Continuous <Continuous>  levoFLOXacin IVPB 750 milliGRAM(s) IV Intermittent every 24 hours  norepinephrine Infusion 0.05 MICROgram(s)/kG/Min IV Continuous <Continuous>  pantoprazole  Injectable 40 milliGRAM(s) IV Push two times a day  piperacillin/tazobactam IVPB.. 3.375 Gram(s) IV Intermittent every 6 hours  propofol Infusion 10 MICROgram(s)/kG/Min IV Continuous <Continuous>  vasopressin Infusion 0.04 Unit(s)/Min IV Continuous <Continuous>    PRN MEDICATIONS    VITALS:   T(F): 98.9  HR: 58  BP: 108/55  RR: 16  SpO2: 99%    PHYSICAL EXAM:  GENERAL: sedated    HEAD:   ( x ) Atraumatic     (  ) hematoma     (  ) laceration (specify location:       )     NECK:  ( x ) Supple     (  ) neck stiffness     (  ) nuchal rigidity     (  )  no JVD     (  ) JVD present ( -- cm)    HEART:  Rate -->     ( x ) normal rate     (  ) bradycardic     (  ) tachycardic  Rhythm -->     ( x ) regular     (  ) regularly irregular     (  ) irregularly irregular  Murmurs -->     ( x ) normal s1s2     (  ) systolic murmur     (  ) diastolic murmur     (  ) continuous murmur      (  ) S3 present     (  ) S4 present    LUNGS: on vent  (  ) Unlabored respirations     (  ) tachypnea  ( x ) B/L air entry     (  ) decreased breath sounds in:  (location     )    (  ) no adventitious sound     (  ) crackles     (  ) wheezing      (  ) rhonchi      (specify location:       )  (  ) chest wall tenderness (specify location:       )    ABDOMEN:   ( x ) Soft     (  ) tense   |   ( x ) nondistended     (  ) distended   |   (  ) +BS     (  ) hypoactive bowel sounds     (  ) hyperactive bowel sounds  (  ) nontender     (  ) RUQ tenderness     (  ) RLQ tenderness     (  ) LLQ tenderness     (  ) epigastric tenderness     (  ) diffuse tenderness  (  ) Splenomegaly      (  ) Hepatomegaly      (  ) Jaundice     (  ) ecchymosis     NERVOUS SYSTEM: sedated        LABS:                        12.0   15.92 )-----------( 268      ( 30 Sep 2023 04:40 )             38.2     09-30    138  |  106  |  20  ----------------------------<  195<H>  4.6   |  22  |  1.1    Ca    8.2<L>      30 Sep 2023 04:40  Phos  5.4     09-29  Mg     1.8     09-30    TPro  5.9<L>  /  Alb  2.8<L>  /  TBili  0.8  /  DBili  x   /  AST  78<H>  /  ALT  49<H>  /  AlkPhos  80  09-30    PT/INR - ( 29 Sep 2023 07:15 )   PT: 13.50 sec;   INR: 1.18 ratio         PTT - ( 29 Sep 2023 07:15 )  PTT:27.6 sec  Urinalysis Basic - ( 30 Sep 2023 04:40 )    Color: x / Appearance: x / SG: x / pH: x  Gluc: 195 mg/dL / Ketone: x  / Bili: x / Urobili: x   Blood: x / Protein: x / Nitrite: x   Leuk Esterase: x / RBC: x / WBC x   Sq Epi: x / Non Sq Epi: x / Bacteria: x      ABG - ( 30 Sep 2023 12:53 )  pH, Arterial: 7.51  pH, Blood: x     /  pCO2: 32    /  pO2: 140   / HCO3: 26    / Base Excess: 3.0   /  SaO2: 99.1              Lactate, Blood: 3.2 mmol/L *H* (09-30-23 @ 04:40)  Troponin T, Serum: 0.34 ng/mL *HH* (09-30-23 @ 04:40)  Lactate, Blood: 5.3 mmol/L *HH* (09-29-23 @ 20:42)  Troponin T, Serum: <0.01 ng/mL (09-29-23 @ 16:00)      CARDIAC MARKERS ( 30 Sep 2023 04:40 )  x     / 0.34 ng/mL / x     / x     / x      CARDIAC MARKERS ( 29 Sep 2023 16:00 )  x     / <0.01 ng/mL / x     / x     / x

## 2023-09-30 NOTE — PROGRESS NOTE ADULT - ASSESSMENT
58 female with PMH of cerebral palsy, non verbal, seizure disorder, down syndrome, impulsive control disorder,, multiple pressure ulcers, orthostatic hypotension (on midodrine), constipation, congenital pulmonary stenosis brought by EMS as she was found hypoxic , unresponsive , dark emesis on the field s/p intubation. In ER, patient was hypotensive, was started on pressors , s/p 2u prbc. GI consulted for dark emesis, found to have duodenal perforation    #Dark emesis with hypotension on pressors - duodenal perforation  #AHRF with PNA s/p intubation  - Hb on admission: 9.9 s/p 2u prbc in ER (last Hb in 8/23: 9.3)>>12 (9/30)  - WBC: 11k, Lactate: 6.7>5.7   - INR: 1.18, BUN/Cr: 22/1.6  - not on AC  - on PPI drip  - NG tube to suction showing dark fluid with blood tinge, very minimal outpt today  - AN: brown stool  - initially on 1.2 levo now on 0.3 today  - CTAP with duodenal perforation, proctocolitis  - elevated tropnonins  - abdomen soft    PLAN:  - Keep NPO with NG tube suction, follow up with surgery   - Maintain active Type and screen  - c/w IV antibiotics  - c/w  protonix  - Please correct electrolytes (Target Na 135-145, Mg 1.7-2.2, K 3.5-5)  - Please correct INR to <1.5  - Please target Hb  >8  - Please avoid any NSAIDs  - If any unstable bleed, please call GI stat  - rest of the care per MICU, surgery team 58 female with PMH of cerebral palsy, non verbal, seizure disorder, down syndrome, impulsive control disorder,, multiple pressure ulcers, orthostatic hypotension (on midodrine), constipation, congenital pulmonary stenosis brought by EMS as she was found hypoxic , unresponsive , dark emesis on the field s/p intubation. In ER, patient was hypotensive, was started on pressors , s/p 2u prbc. GI consulted for dark emesis, found to have duodenal perforation    #Dark emesis with hypotension on pressors - duodenal perforation  #AHRF with PNA s/p intubation  - Hb on admission: 9.9 s/p 2u prbc in ER (last Hb in 8/23: 9.3)>>12 (9/30)  - WBC: 11k, Lactate: 6.7>5.7   - INR: 1.18, BUN/Cr: 22/1.6  - not on AC  - on PPI drip  - NG tube to suction showing dark fluid with blood tinge, very minimal outpt today  - AN: brown stool  - initially on 1.2 levo now on 0.3 today  - CTAP with duodenal perforation, proctocolitis  - elevated tropnonins  - abdomen soft    PLAN:  - can start feeds per primary team   - Maintain active Type and screen  - c/w IV antibiotics  - change to protonix IV BID  - Please correct electrolytes (Target Na 135-145, Mg 1.7-2.2, K 3.5-5)  - Please correct INR to <1.5  - Please target Hb  >8  - Please avoid any NSAIDs  - If any unstable bleed, please call GI stat  - rest of the care per MICU, surgery team  recall us PRN 58 female with PMH of cerebral palsy, non verbal, seizure disorder, down syndrome, impulsive control disorder,, multiple pressure ulcers, orthostatic hypotension (on midodrine), constipation, congenital pulmonary stenosis brought by EMS as she was found hypoxic , unresponsive , dark emesis on the field s/p intubation. In ER, patient was hypotensive, was started on pressors , s/p 2u prbc. GI consulted for dark emesis, found to have duodenal perforation    #Dark emesis with hypotension on pressors - duodenal perforation  #AHRF with PNA s/p intubation  - Hb on admission: 9.9 s/p 2u prbc in ER (last Hb in 8/23: 9.3)>>12 (9/30)  - WBC: 11k, Lactate: 6.7>5.7   - INR: 1.18, BUN/Cr: 22/1.6  - not on AC  - on PPI drip  - NG tube to suction showing dark fluid with blood tinge, very minimal outpt today  - AN: brown stool  - initially on 1.2 levo now on 0.3 today  - CTAP with duodenal perforation, proctocolitis  - elevated tropnonins  - abdomen soft    PLAN:  - NPO to suction per surgery  - Maintain active Type and screen  - c/w IV antibiotics  - change to protonix IV BID  - Please correct electrolytes (Target Na 135-145, Mg 1.7-2.2, K 3.5-5)  - Please correct INR to <1.5  - Please target Hb  >8  - Please avoid any NSAIDs  - If any unstable bleed, please call GI stat  - rest of the care per MICU, surgery team

## 2023-09-30 NOTE — DIETITIAN INITIAL EVALUATION ADULT - NAME AND PHONE
Audeila [phone] or TEAMS    Nutrition Interventions: TF regimen, coordination of care; Nutrition Monitoring: Diet order, weights, labs, NFPF, body composition, BM and tolerance to TF regimen

## 2023-09-30 NOTE — DIETITIAN INITIAL EVALUATION ADULT - ADD RECOMMEND
1. Recommend TF regimen: Peptamen AF (for blood glucose control), continuous  1. Recommend TF regimen if within GOC: Peptamen AF (for blood glucose control), continuous via NGT or OGT?  Total Volume: 960 mL  Start rate at 20 mL/hr; Goal rate at 40 mL/hr  Add no carb prosource once daily - provides 60 kcal 15g pro   TF regimen with no carb prosource provides 1437 kcal, 87.9g pro, 779.5 mL free water.   Additional water flushes: 50 mL q6hrs (total water with flushes: 979.5 mL) -- team to adjust prn  2. Supplement with Kendall 2x/DAILY to aid in wound healing -- provides 180 kcal, 5g pro total    Pt is at high nutrition risk; will f/u in 3-5 days or prn.

## 2023-09-30 NOTE — PROGRESS NOTE ADULT - ASSESSMENT
IMPRESSION:    Acute hypoxemic respiratory failure   Suspected upper GI bleed s/p 2 units pRBC  Possible perforation sp surgical evaluation   Severe community acquired pneumonia  Likely aspiration  NSTEMI likely type II   NELA likely prerenal improving   H/o CP  H/o seizures    PLAN:    CNS: SAT.  assess MS     HEENT: Oral and ET tube care    PULMONARY: HOB at 45 degrees.  Vent changes  .  FiO2 40.  DTA.  monitor PPL and DP     CARDIOVASCULAR: Cheetah and Wean levophed.  Continue gentle hydration.  ECHO.  Trend trops and lactate.  Obtain echo.    GI: Keep NPO. Protonix BID.  Surgery follow up.  GI follow up.  CT noted.      RENAL:  Voiding trial Bladder scans.  FU lytes.  Correct as needed     INFECTIOUS DISEASE:  Zosyn adn Levaquin for now.  Nasal MRSA negative.  Urine legionella and strep     HEMATOLOGICAL: DVT prophylaxis seq.  Monitor CBC.      ENDOCRINE:  Follow up FS.  Insulin protocol if needed.    MUSCULOSKELETAL: Bedrest.  off loading.      DC TLC     Prognosis guarded.      DISPO: ICU

## 2023-09-30 NOTE — PROGRESS NOTE ADULT - SUBJECTIVE AND OBJECTIVE BOX
Patient is a 58y old  Female who presents with a chief complaint of Upper GI bleed (30 Sep 2023 04:17)        Over Night Events:  Remains critically ill on MV.  Sedated.  on levophed.  No bleeding.          ROS:     All ROS are negative except HPI         PHYSICAL EXAM    ICU Vital Signs Last 24 Hrs  T(C): 37.2 (30 Sep 2023 08:00), Max: 38 (30 Sep 2023 04:00)  T(F): 99 (30 Sep 2023 08:00), Max: 100.4 (30 Sep 2023 04:00)  HR: 67 (30 Sep 2023 07:30) (45 - 145)  BP: 128/59 (30 Sep 2023 07:30) (89/42 - 142/89)  BP(mean): 84 (30 Sep 2023 07:30) (60 - 122)  ABP: 83/76 (29 Sep 2023 15:30) (82/75 - 154/80)  ABP(mean): 79 (29 Sep 2023 15:30) (79 - 110)  RR: 20 (30 Sep 2023 07:30) (14 - 27)  SpO2: 98% (30 Sep 2023 07:30) (95% - 100%)    O2 Parameters below as of 30 Sep 2023 08:00  Patient On (Oxygen Delivery Method): ventilator    O2 Concentration (%): 50        CONSTITUTIONAL:  In NAD    ENT:   Airway patent,   Mouth with normal mucosa.       EYES:   Pupils equal,   Round and reactive to light.    CARDIAC:   Normal rate,   Regular rhythm.        RESPIRATORY:   No wheezing  Bilateral BS  Normal chest expansion  Not tachypneic,  No use of accessory muscles    GASTROINTESTINAL:  Abdomen soft,   Non-tender,   No guarding,   + BS    MUSCULOSKELETAL:   Range of motion is not limited,  No clubbing, cyanosis    NEUROLOGICAL:   Sedated   No motor  deficits.    SKIN:   Skin normal color for race,    No evidence of rash.          09-29-23 @ 07:01  -  09-30-23 @ 07:00  --------------------------------------------------------  IN:    FentaNYL: 24 mL    FentaNYL: 10 mL    IV PiggyBack: 400 mL    Lactated Ringers: 1330 mL    Norepinephrine: 409.5 mL    Norepinephrine: 225 mL    Pantoprazole: 50 mL    Pantoprazole: 150 mL    Propofol: 86.4 mL    Vasopressin: 90 mL  Total IN: 2774.9 mL    OUT:    Voided (mL): 845 mL  Total OUT: 845 mL    Total NET: 1929.9 mL          LABS:                            12.0   15.92 )-----------( 268      ( 30 Sep 2023 04:40 )             38.2                                               09-30    138  |  106  |  20  ----------------------------<  195<H>  4.6   |  22  |  1.1    Ca    8.2<L>      30 Sep 2023 04:40  Phos  5.4     09-29  Mg     1.8     09-30    TPro  5.9<L>  /  Alb  2.8<L>  /  TBili  0.8  /  DBili  x   /  AST  78<H>  /  ALT  49<H>  /  AlkPhos  80  09-30      PT/INR - ( 29 Sep 2023 07:15 )   PT: 13.50 sec;   INR: 1.18 ratio         PTT - ( 29 Sep 2023 07:15 )  PTT:27.6 sec                                       Urinalysis Basic - ( 30 Sep 2023 04:40 )    Color: x / Appearance: x / SG: x / pH: x  Gluc: 195 mg/dL / Ketone: x  / Bili: x / Urobili: x   Blood: x / Protein: x / Nitrite: x   Leuk Esterase: x / RBC: x / WBC x   Sq Epi: x / Non Sq Epi: x / Bacteria: x        CARDIAC MARKERS ( 30 Sep 2023 04:40 )  x     / 0.34 ng/mL / x     / x     / x      CARDIAC MARKERS ( 29 Sep 2023 16:00 )  x     / <0.01 ng/mL / x     / x     / x                                                LIVER FUNCTIONS - ( 30 Sep 2023 04:40 )  Alb: 2.8 g/dL / Pro: 5.9 g/dL / ALK PHOS: 80 U/L / ALT: 49 U/L / AST: 78 U/L / GGT: x                                                                                               Mode: AC/ CMV (Assist Control/ Continuous Mandatory Ventilation)  RR (machine): 16  TV (machine): 450  FiO2: 50  PEEP: 8  ITime: 0.9  MAP: 13  PIP: 29                                      ABG - ( 30 Sep 2023 03:21 )  pH, Arterial: 7.47  pH, Blood: x     /  pCO2: 32    /  pO2: 126   / HCO3: 23    / Base Excess: 0.3   /  SaO2: 99.5                 MEDICATIONS  (STANDING):  chlorhexidine 0.12% Liquid 15 milliLiter(s) Oral Mucosa every 12 hours  chlorhexidine 2% Cloths 1 Application(s) Topical <User Schedule>  fentaNYL   Infusion. 0.5 MICROgram(s)/kG/Hr (2.77 mL/Hr) IV Continuous <Continuous>  gabapentin 600 milliGRAM(s) Oral daily  lactated ringers. 1000 milliLiter(s) (70 mL/Hr) IV Continuous <Continuous>  meropenem  IVPB 1000 milliGRAM(s) IV Intermittent every 12 hours  norepinephrine Infusion 0.05 MICROgram(s)/kG/Min (2.59 mL/Hr) IV Continuous <Continuous>  pantoprazole Infusion 8 mG/Hr (10 mL/Hr) IV Continuous <Continuous>  propofol Infusion 10 MICROgram(s)/kG/Min (3.6 mL/Hr) IV Continuous <Continuous>  vasopressin Infusion 0.04 Unit(s)/Min (6 mL/Hr) IV Continuous <Continuous>    MEDICATIONS  (PRN):      New X-rays reviewed:                                                                                  ECHO

## 2023-09-30 NOTE — DIETITIAN INITIAL EVALUATION ADULT - PERTINENT MEDS FT
MEDICATIONS  (STANDING):  chlorhexidine 0.12% Liquid 15 milliLiter(s) Oral Mucosa every 12 hours  chlorhexidine 2% Cloths 1 Application(s) Topical <User Schedule>  fentaNYL   Infusion. 0.5 MICROgram(s)/kG/Hr (2.77 mL/Hr) IV Continuous <Continuous>  gabapentin 600 milliGRAM(s) Oral daily  lactated ringers. 1000 milliLiter(s) (70 mL/Hr) IV Continuous <Continuous>  levoFLOXacin IVPB 750 milliGRAM(s) IV Intermittent every 24 hours  norepinephrine Infusion 0.05 MICROgram(s)/kG/Min (2.59 mL/Hr) IV Continuous <Continuous>  pantoprazole  Injectable 40 milliGRAM(s) IV Push two times a day  piperacillin/tazobactam IVPB.. 3.375 Gram(s) IV Intermittent every 6 hours  propofol Infusion 10 MICROgram(s)/kG/Min (3.6 mL/Hr) IV Continuous <Continuous>  vasopressin Infusion 0.04 Unit(s)/Min (6 mL/Hr) IV Continuous <Continuous>    MEDICATIONS  (PRN):

## 2023-09-30 NOTE — CONSULT NOTE ADULT - SUBJECTIVE AND OBJECTIVE BOX
INFECTIOUS DISEASE CONSULT NOTE    Patient is a 58y old  Female who presents with a chief complaint of Gastrointestinal hemorrhage     (30 Sep 2023 09:08)    HPI:  58 year old female with a PMHx of Down syndrome, nonverbal at baseline, hypothyroidism, cerebral palsy, congenital pulmonary stenosis presents to the ED for hemoptysis.    Patient was recently admitted to the hospital for management of pneumonia and resultant hypoxic respiratory failure.  Patient was noted of having hematemesis when she was admitted previously, a CT of the abdomen showed esophagitis for which GI recommended OP upper endoscopy.    On the day of presentation, the patient was less responsive with noted hemoptysis.   When asking the group home personnel, patient was not exhibiting any signs of respiratory distress, abdominal pain, cough, shortness of breath, fevers/ sweating, or any changes in bowel habits.    In the ED, the patient was HD stable, afebrile.  Labs workup revealed Hb= 9.9, WBC= 11k, plt= 432, creatinine= 1.6.  pH= 7.19/ CO2= 55/ O2= 188/ 21.    Patient received 2 PRBCs, got intubated for airway protection where blood was noted in the trachea.  A CT of the abdomen showed foci of air in the lumen of the stomach, consistent with duodenal perforation.  The CT showed as well bibasilar infiltrates, consistent with pneumonia.    Surgery were consulted, as well as GI.  Patient was started on Protonix drip, and got admitted for management. (29 Sep 2023 13:51)         Prior hospital charts reviewed [Yes]  Primary team notes reviewed [Yes]  Other consultant notes reviewed [Yes]    REVIEW OF SYSTEMS:  CONSTITUTIONAL: No fever or chills  HEAD: No lesion on scalp  EYES: No visual disturbance  ENT: No sore throat  RESPIRATORY: No cough, no shortness of breath  CARDIOVASCULAR: No chest pain or palpitations  GASTROINTESTINAL: No abdominal or epigastric pain  GENITOURINARY: No dysuria  NEUROLOGICAL: No headache/dizziness  MUSCULOSKELETAL: No joint pain, erythema, or swelling; no back pain  SKIN: No itching, rashes  All other ROS negative except noted above    PAST MEDICAL & SURGICAL HISTORY:  Down syndrome      Osteoporosis      Mild anemia      Neuropathy      S/P debridement  of R hip on 3/2/21          SOCIAL HISTORY:  - Born in [place], migrated [place] in 19XX  - Currently working as / Retired  - Lives with _____; no pets  - No recent travel  - Denies tobacco use  - Denies alcohol use  - Denies illicit drug use  - Currently sexually active, has one male/female sexual partner    FAMILY HISTORY:      Allergies:  No Known Allergies      ANTIMICROBIALS:  levoFLOXacin IVPB 750 every 24 hours  piperacillin/tazobactam IVPB.. 3.375 every 6 hours      ANTIMICROBIALS (past 90 days):  MEDICATIONS  (STANDING):    azithromycin  IVPB   255 mL/Hr IV Intermittent (09-29-23 @ 10:52)    clindamycin IVPB   100 mL/Hr IV Intermittent (09-29-23 @ 10:05)    meropenem  IVPB   100 mL/Hr IV Intermittent (09-29-23 @ 17:00)    meropenem  IVPB   100 mL/Hr IV Intermittent (09-30-23 @ 05:08)        OTHER MEDS:   MEDICATIONS  (STANDING):  fentaNYL   Infusion. 0.5 <Continuous>  gabapentin 600 daily  norepinephrine Infusion 0.05 <Continuous>  pantoprazole  Injectable 40 two times a day  propofol Infusion 10 <Continuous>  vasopressin Infusion 0.04 <Continuous>      VITALS:  Vital Signs Last 24 Hrs  T(F): 99 (09-30-23 @ 08:00), Max: 100.4 (09-30-23 @ 04:00)    Vital Signs Last 24 Hrs  HR: 54 (09-30-23 @ 10:00) (45 - 145)  BP: 138/66 (09-30-23 @ 10:00) (89/42 - 142/89)  RR: 16 (09-30-23 @ 10:00)  SpO2: 99% (09-30-23 @ 10:00) (95% - 100%)  Wt(kg): --    EXAM:  GENERAL: NAD, lying in bed  HEAD: No head lesions  EYES: Conjunctiva pink and cornea white  EAR, NOSE, MOUTH, THROAT: Normal external ears and nose, no discharges; moist mucous membranes  NECK: Supple, nontender to palpation; no JVD  RESPIRATORY: Clear to auscultation bilaterally  CARDIOVASCULAR: S1 S2  GASTROINTESTINAL: Soft, nontender, nondistended; normoactive bowel sounds  EXTREMITIES: No clubbing, cyanosis, or petal edema  NERVOUS SYSTEM: Alert and oriented to person, time, place and situation, speech clear. No focal deficits   MUSCULOSKELETAL: No joint erythema, swelling or pain  SKIN: No rashes or lesions, no superficial thrombophlebitis  PSYCH: Normal affect    Labs:                        12.0   15.92 )-----------( 268      ( 30 Sep 2023 04:40 )             38.2     09-30    138  |  106  |  20  ----------------------------<  195<H>  4.6   |  22  |  1.1    Ca    8.2<L>      30 Sep 2023 04:40  Phos  5.4     09-29  Mg     1.8     09-30    TPro  5.9<L>  /  Alb  2.8<L>  /  TBili  0.8  /  DBili  x   /  AST  78<H>  /  ALT  49<H>  /  AlkPhos  80  09-30      WBC Trend:  WBC Count: 15.92 (09-30-23 @ 04:40)  WBC Count: 15.21 (09-29-23 @ 23:30)  WBC Count: 20.01 (09-29-23 @ 20:02)  WBC Count: 14.55 (09-29-23 @ 16:00)      Auto Neutrophil #: 13.63 K/uL (09-30-23 @ 04:40)  Auto Neutrophil #: 12.66 K/uL (09-29-23 @ 23:30)  Auto Neutrophil #: 9.76 K/uL (09-29-23 @ 07:15)  Auto Neutrophil #: 5.06 K/uL (08-17-23 @ 07:18)  Auto Neutrophil #: 3.08 K/uL (08-16-23 @ 08:09)      Creatine Trend:  Creatinine: 1.1 (09-30)  Creatinine: 1.4 (09-29)  Creatinine: 1.4 (09-29)  Creatinine: 1.6 (09-29)      Liver Biochemical Testing Trend:  Alanine Aminotransferase (ALT/SGPT): 49 *H* (09-30)  Alanine Aminotransferase (ALT/SGPT): 17 (09-29)  Alanine Aminotransferase (ALT/SGPT): 20 (08-14)  Alanine Aminotransferase (ALT/SGPT): 19 (08-12)  Alanine Aminotransferase (ALT/SGPT): 17 (08-12)  Aspartate Aminotransferase (AST/SGOT): 78 (09-30-23 @ 04:40)  Aspartate Aminotransferase (AST/SGOT): 67 (09-29-23 @ 07:15)  Aspartate Aminotransferase (AST/SGOT): 24 (08-14-23 @ 05:22)  Aspartate Aminotransferase (AST/SGOT): 27 (08-12-23 @ 11:25)  Aspartate Aminotransferase (AST/SGOT): 33 (08-12-23 @ 01:03)  Bilirubin Total: 0.8 (09-30)  Bilirubin Total: 0.4 (09-29)  Bilirubin Total: <0.2 (08-14)  Bilirubin Total: 0.3 (08-12)  Bilirubin Total: <0.2 (08-12)    Trend LDH      Auto Eosinophil %: 0.2 % (09-30-23 @ 04:40)  Auto Eosinophil %: 0.1 % (09-29-23 @ 23:30)  Auto Eosinophil %: 0.0 % (09-29-23 @ 07:15)      Urinalysis Basic - ( 30 Sep 2023 04:40 )    Color: x / Appearance: x / SG: x / pH: x  Gluc: 195 mg/dL / Ketone: x  / Bili: x / Urobili: x   Blood: x / Protein: x / Nitrite: x   Leuk Esterase: x / RBC: x / WBC x   Sq Epi: x / Non Sq Epi: x / Bacteria: x        MICROBIOLOGY:    MRSA PCR Result.: Negative (09-29-23 @ 16:50)  MRSA PCR Result.: Negative (08-12-23 @ 06:10)  MRSA PCR Result.: Negative (08-04-23 @ 14:52)      Lactate, Blood: 3.2 (09-30 @ 04:40)  Blood Gas Arterial, Lactate: 2.40 (09-30 @ 03:21)  Lactate, Blood: 5.3 (09-29 @ 20:42)  Blood Gas Arterial, Lactate: 4.20 (09-29 @ 14:15)        RADIOLOGY:  imaging below personally reviewed    < from: CT Abdomen and Pelvis w/ IV Cont (09.29.23 @ 12:44) >  FINDINGS:    LOWER CHEST: Bilateral lower lobe and right middle lobe airspace   consolidations with air bronchograms.    HEPATOBILIARY: Cholelithiasis with pericholecystic fluid/edema. No   biliary ductal dilatation. Nonspecific periportal edema. Patent portal   vein.    SPLEEN: Unremarkable.    PANCREAS: Unremarkable.    ADRENAL GLANDS: Unremarkable.    KIDNEYS: Symmetric renal enhancement without hydronephrosis bilaterally.   Foci of air within the right upper pole (series 6 image 289), new from   prior.    ABDOMINOPELVIC NODES: Unremarkable.    PELVIC ORGANS: Madsen catheter within urinary bladder. Air within urinary   bladder, nonspecific.    PERITONEUM/MESENTERY/BOWEL: Foci of extraluminal air along posterior wall   of third portion duodenum (series 6 image 305), new from prior. No bowel   obstruction. Diffuse circumferential wall thickening of the colon and   rectum, nonspecific. No intraluminal contrast. Feeding tube in stomach..    BONES/SOFT TISSUES: Degenerative changes of the spine. No acute osseous   abnormality..    OTHER: Left common femoral vein central venous catheter. Atherosclerosis   abdominal aorta and branches.      IMPRESSION:    Since August 12, 2023;    1. New foci of extraluminal air along posterior wall of third portion   duodenum, most consistent with perforated ulcer. No evidence for   intraluminal contrast or active gastrointestinal bleeding.    2.Diffuse circumferential wall thickening of the colon and rectum,   nonspecific. Correlate for proctocolitis.    3. Bilateral lower lobe and right middle lobe multifocal pneumonia.    --- End of Report ---    < end of copied text > INFECTIOUS DISEASE CONSULT NOTE    Patient is a 58y old  Female who presents with a chief complaint of Gastrointestinal hemorrhage     (30 Sep 2023 09:08)    HPI:  58 year old female with a PMHx of Down syndrome, nonverbal at baseline, hypothyroidism, cerebral palsy, congenital pulmonary stenosis presents to the ED for hemoptysis.    Patient was recently admitted to the hospital for management of pneumonia and resultant hypoxic respiratory failure.  Patient was noted of having hematemesis when she was admitted previously, a CT of the abdomen showed esophagitis for which GI recommended OP upper endoscopy.    On the day of presentation, the patient was less responsive with noted hemoptysis.   When asking the group home personnel, patient was not exhibiting any signs of respiratory distress, abdominal pain, cough, shortness of breath, fevers/ sweating, or any changes in bowel habits.    In the ED, the patient was HD stable, afebrile.  Labs workup revealed Hb= 9.9, WBC= 11k, plt= 432, creatinine= 1.6.  pH= 7.19/ CO2= 55/ O2= 188/ 21.    Patient received 2 PRBCs, got intubated for airway protection where blood was noted in the trachea.  A CT of the abdomen showed foci of air in the lumen of the stomach, consistent with duodenal perforation.  The CT showed as well bibasilar infiltrates, consistent with pneumonia.    Surgery were consulted, as well as GI.  Patient was started on Protonix drip, and got admitted for management. (29 Sep 2023 13:51)         Prior hospital charts reviewed [Yes]  Primary team notes reviewed [Yes]  Other consultant notes reviewed [Yes]    REVIEW OF SYSTEMS:  Unable to obtain as patient is intubated    PAST MEDICAL & SURGICAL HISTORY:  Down syndrome      Osteoporosis      Mild anemia      Neuropathy      S/P debridement  of R hip on 3/2/21      SOCIAL HISTORY:  Unable to obtain as patient is intubated    FAMILY HISTORY:  Unable to obtain as patient is intubated    Allergies:  No Known Allergies      ANTIMICROBIALS:  levoFLOXacin IVPB 750 every 24 hours  piperacillin/tazobactam IVPB.. 3.375 every 6 hours      ANTIMICROBIALS (past 90 days):  MEDICATIONS  (STANDING):    azithromycin  IVPB   255 mL/Hr IV Intermittent (09-29-23 @ 10:52)    clindamycin IVPB   100 mL/Hr IV Intermittent (09-29-23 @ 10:05)    meropenem  IVPB   100 mL/Hr IV Intermittent (09-29-23 @ 17:00)    meropenem  IVPB   100 mL/Hr IV Intermittent (09-30-23 @ 05:08)        OTHER MEDS:   MEDICATIONS  (STANDING):  fentaNYL   Infusion. 0.5 <Continuous>  gabapentin 600 daily  norepinephrine Infusion 0.05 <Continuous>  pantoprazole  Injectable 40 two times a day  propofol Infusion 10 <Continuous>  vasopressin Infusion 0.04 <Continuous>      VITALS:  Vital Signs Last 24 Hrs  T(F): 99 (09-30-23 @ 08:00), Max: 100.4 (09-30-23 @ 04:00)    Vital Signs Last 24 Hrs  HR: 54 (09-30-23 @ 10:00) (45 - 145)  BP: 138/66 (09-30-23 @ 10:00) (89/42 - 142/89)  RR: 16 (09-30-23 @ 10:00)  SpO2: 99% (09-30-23 @ 10:00) (95% - 100%)  Wt(kg): --    EXAM:  GENERAL: intubated and sedated  HEAD: No head lesions  EYES: Conjunctiva pink and cornea white  EAR, NOSE, MOUTH, THROAT: Normal external ears and nose, no discharges; moist mucous membranes: + ET tube  NECK: Supple, nontender to palpation; no JVD  RESPIRATORY: + bilateral air entry  CARDIOVASCULAR: S1 S2  GASTROINTESTINAL: Soft, nontender, nondistended; normoactive bowel sounds  EXTREMITIES: No clubbing, cyanosis, or petal edema  NERVOUS SYSTEM:Sedated  MUSCULOSKELETAL: No joint erythema, swelling  SKIN: No rashes or lesions, no superficial thrombophlebitis  PSYCH: Sedated    Labs:                        12.0   15.92 )-----------( 268      ( 30 Sep 2023 04:40 )             38.2     09-30    138  |  106  |  20  ----------------------------<  195<H>  4.6   |  22  |  1.1    Ca    8.2<L>      30 Sep 2023 04:40  Phos  5.4     09-29  Mg     1.8     09-30    TPro  5.9<L>  /  Alb  2.8<L>  /  TBili  0.8  /  DBili  x   /  AST  78<H>  /  ALT  49<H>  /  AlkPhos  80  09-30      WBC Trend:  WBC Count: 15.92 (09-30-23 @ 04:40)  WBC Count: 15.21 (09-29-23 @ 23:30)  WBC Count: 20.01 (09-29-23 @ 20:02)  WBC Count: 14.55 (09-29-23 @ 16:00)      Auto Neutrophil #: 13.63 K/uL (09-30-23 @ 04:40)  Auto Neutrophil #: 12.66 K/uL (09-29-23 @ 23:30)  Auto Neutrophil #: 9.76 K/uL (09-29-23 @ 07:15)  Auto Neutrophil #: 5.06 K/uL (08-17-23 @ 07:18)  Auto Neutrophil #: 3.08 K/uL (08-16-23 @ 08:09)      Creatine Trend:  Creatinine: 1.1 (09-30)  Creatinine: 1.4 (09-29)  Creatinine: 1.4 (09-29)  Creatinine: 1.6 (09-29)      Liver Biochemical Testing Trend:  Alanine Aminotransferase (ALT/SGPT): 49 *H* (09-30)  Alanine Aminotransferase (ALT/SGPT): 17 (09-29)  Alanine Aminotransferase (ALT/SGPT): 20 (08-14)  Alanine Aminotransferase (ALT/SGPT): 19 (08-12)  Alanine Aminotransferase (ALT/SGPT): 17 (08-12)  Aspartate Aminotransferase (AST/SGOT): 78 (09-30-23 @ 04:40)  Aspartate Aminotransferase (AST/SGOT): 67 (09-29-23 @ 07:15)  Aspartate Aminotransferase (AST/SGOT): 24 (08-14-23 @ 05:22)  Aspartate Aminotransferase (AST/SGOT): 27 (08-12-23 @ 11:25)  Aspartate Aminotransferase (AST/SGOT): 33 (08-12-23 @ 01:03)  Bilirubin Total: 0.8 (09-30)  Bilirubin Total: 0.4 (09-29)  Bilirubin Total: <0.2 (08-14)  Bilirubin Total: 0.3 (08-12)  Bilirubin Total: <0.2 (08-12)    Trend LDH      Auto Eosinophil %: 0.2 % (09-30-23 @ 04:40)  Auto Eosinophil %: 0.1 % (09-29-23 @ 23:30)  Auto Eosinophil %: 0.0 % (09-29-23 @ 07:15)      Urinalysis Basic - ( 30 Sep 2023 04:40 )    Color: x / Appearance: x / SG: x / pH: x  Gluc: 195 mg/dL / Ketone: x  / Bili: x / Urobili: x   Blood: x / Protein: x / Nitrite: x   Leuk Esterase: x / RBC: x / WBC x   Sq Epi: x / Non Sq Epi: x / Bacteria: x        MICROBIOLOGY:    MRSA PCR Result.: Negative (09-29-23 @ 16:50)  MRSA PCR Result.: Negative (08-12-23 @ 06:10)  MRSA PCR Result.: Negative (08-04-23 @ 14:52)      Lactate, Blood: 3.2 (09-30 @ 04:40)  Blood Gas Arterial, Lactate: 2.40 (09-30 @ 03:21)  Lactate, Blood: 5.3 (09-29 @ 20:42)  Blood Gas Arterial, Lactate: 4.20 (09-29 @ 14:15)        RADIOLOGY:  imaging below personally reviewed    < from: CT Abdomen and Pelvis w/ IV Cont (09.29.23 @ 12:44) >  FINDINGS:    LOWER CHEST: Bilateral lower lobe and right middle lobe airspace   consolidations with air bronchograms.    HEPATOBILIARY: Cholelithiasis with pericholecystic fluid/edema. No   biliary ductal dilatation. Nonspecific periportal edema. Patent portal   vein.    SPLEEN: Unremarkable.    PANCREAS: Unremarkable.    ADRENAL GLANDS: Unremarkable.    KIDNEYS: Symmetric renal enhancement without hydronephrosis bilaterally.   Foci of air within the right upper pole (series 6 image 289), new from   prior.    ABDOMINOPELVIC NODES: Unremarkable.    PELVIC ORGANS: Madsen catheter within urinary bladder. Air within urinary   bladder, nonspecific.    PERITONEUM/MESENTERY/BOWEL: Foci of extraluminal air along posterior wall   of third portion duodenum (series 6 image 305), new from prior. No bowel   obstruction. Diffuse circumferential wall thickening of the colon and   rectum, nonspecific. No intraluminal contrast. Feeding tube in stomach..    BONES/SOFT TISSUES: Degenerative changes of the spine. No acute osseous   abnormality..    OTHER: Left common femoral vein central venous catheter. Atherosclerosis   abdominal aorta and branches.      IMPRESSION:    Since August 12, 2023;    1. New foci of extraluminal air along posterior wall of third portion   duodenum, most consistent with perforated ulcer. No evidence for   intraluminal contrast or active gastrointestinal bleeding.    2.Diffuse circumferential wall thickening of the colon and rectum,   nonspecific. Correlate for proctocolitis.    3. Bilateral lower lobe and right middle lobe multifocal pneumonia.    --- End of Report ---    < end of copied text >

## 2023-10-01 LAB
-  BLOOD PCR PANEL: SIGNIFICANT CHANGE UP
ALBUMIN SERPL ELPH-MCNC: 2.2 G/DL — LOW (ref 3.5–5.2)
ALP SERPL-CCNC: 73 U/L — SIGNIFICANT CHANGE UP (ref 30–115)
ALT FLD-CCNC: 29 U/L — SIGNIFICANT CHANGE UP (ref 0–41)
ANION GAP SERPL CALC-SCNC: 9 MMOL/L — SIGNIFICANT CHANGE UP (ref 7–14)
AST SERPL-CCNC: 31 U/L — SIGNIFICANT CHANGE UP (ref 0–41)
BASE EXCESS BLDA CALC-SCNC: 5.5 MMOL/L — HIGH (ref -2–3)
BASOPHILS # BLD AUTO: 0.06 K/UL — SIGNIFICANT CHANGE UP (ref 0–0.2)
BASOPHILS NFR BLD AUTO: 0.4 % — SIGNIFICANT CHANGE UP (ref 0–1)
BILIRUB SERPL-MCNC: 0.6 MG/DL — SIGNIFICANT CHANGE UP (ref 0.2–1.2)
BUN SERPL-MCNC: 11 MG/DL — SIGNIFICANT CHANGE UP (ref 10–20)
CALCIUM SERPL-MCNC: 8 MG/DL — LOW (ref 8.4–10.4)
CHLORIDE SERPL-SCNC: 103 MMOL/L — SIGNIFICANT CHANGE UP (ref 98–110)
CO2 SERPL-SCNC: 25 MMOL/L — SIGNIFICANT CHANGE UP (ref 17–32)
CREAT SERPL-MCNC: 0.8 MG/DL — SIGNIFICANT CHANGE UP (ref 0.7–1.5)
CULTURE RESULTS: SIGNIFICANT CHANGE UP
EGFR: 86 ML/MIN/1.73M2 — SIGNIFICANT CHANGE UP
EOSINOPHIL # BLD AUTO: 0.09 K/UL — SIGNIFICANT CHANGE UP (ref 0–0.7)
EOSINOPHIL NFR BLD AUTO: 0.7 % — SIGNIFICANT CHANGE UP (ref 0–8)
GAS PNL BLDA: SIGNIFICANT CHANGE UP
GLUCOSE SERPL-MCNC: 148 MG/DL — HIGH (ref 70–99)
GRAM STN FLD: SIGNIFICANT CHANGE UP
HCO3 BLDA-SCNC: 29 MMOL/L — HIGH (ref 21–28)
HCT VFR BLD CALC: 31.4 % — LOW (ref 37–47)
HGB BLD-MCNC: 10.2 G/DL — LOW (ref 12–16)
HOROWITZ INDEX BLDA+IHG-RTO: 40 — SIGNIFICANT CHANGE UP
IMM GRANULOCYTES NFR BLD AUTO: 0.4 % — HIGH (ref 0.1–0.3)
LYMPHOCYTES # BLD AUTO: 0.96 K/UL — LOW (ref 1.2–3.4)
LYMPHOCYTES # BLD AUTO: 7.1 % — LOW (ref 20.5–51.1)
MAGNESIUM SERPL-MCNC: 1.6 MG/DL — LOW (ref 1.8–2.4)
MCHC RBC-ENTMCNC: 23.4 PG — LOW (ref 27–31)
MCHC RBC-ENTMCNC: 32.5 G/DL — SIGNIFICANT CHANGE UP (ref 32–37)
MCV RBC AUTO: 72 FL — LOW (ref 81–99)
METHOD TYPE: SIGNIFICANT CHANGE UP
MONOCYTES # BLD AUTO: 0.35 K/UL — SIGNIFICANT CHANGE UP (ref 0.1–0.6)
MONOCYTES NFR BLD AUTO: 2.6 % — SIGNIFICANT CHANGE UP (ref 1.7–9.3)
NEUTROPHILS # BLD AUTO: 12.03 K/UL — HIGH (ref 1.4–6.5)
NEUTROPHILS NFR BLD AUTO: 88.8 % — HIGH (ref 42.2–75.2)
NRBC # BLD: 0 /100 WBCS — SIGNIFICANT CHANGE UP (ref 0–0)
PCO2 BLDA: 37 MMHG — SIGNIFICANT CHANGE UP (ref 25–48)
PH BLDA: 7.5 — HIGH (ref 7.35–7.45)
PLATELET # BLD AUTO: 171 K/UL — SIGNIFICANT CHANGE UP (ref 130–400)
PMV BLD: 10.4 FL — SIGNIFICANT CHANGE UP (ref 7.4–10.4)
PO2 BLDA: 116 MMHG — HIGH (ref 83–108)
POTASSIUM SERPL-MCNC: 3.4 MMOL/L — LOW (ref 3.5–5)
POTASSIUM SERPL-SCNC: 3.4 MMOL/L — LOW (ref 3.5–5)
PROT SERPL-MCNC: 5 G/DL — LOW (ref 6–8)
RBC # BLD: 4.36 M/UL — SIGNIFICANT CHANGE UP (ref 4.2–5.4)
RBC # FLD: 17.9 % — HIGH (ref 11.5–14.5)
S PNEUM AG UR QL: NEGATIVE — SIGNIFICANT CHANGE UP
SAO2 % BLDA: 99.5 % — HIGH (ref 94–98)
SODIUM SERPL-SCNC: 137 MMOL/L — SIGNIFICANT CHANGE UP (ref 135–146)
SPECIMEN SOURCE: SIGNIFICANT CHANGE UP
TROPONIN T SERPL-MCNC: 0.11 NG/ML — CRITICAL HIGH
WBC # BLD: 13.55 K/UL — HIGH (ref 4.8–10.8)
WBC # FLD AUTO: 13.55 K/UL — HIGH (ref 4.8–10.8)

## 2023-10-01 PROCEDURE — 99291 CRITICAL CARE FIRST HOUR: CPT

## 2023-10-01 PROCEDURE — 99232 SBSQ HOSP IP/OBS MODERATE 35: CPT | Mod: 24,25

## 2023-10-01 PROCEDURE — 71045 X-RAY EXAM CHEST 1 VIEW: CPT | Mod: 26

## 2023-10-01 PROCEDURE — 74177 CT ABD & PELVIS W/CONTRAST: CPT | Mod: 26

## 2023-10-01 PROCEDURE — 71250 CT THORAX DX C-: CPT | Mod: 26

## 2023-10-01 PROCEDURE — 93971 EXTREMITY STUDY: CPT | Mod: 26,LT

## 2023-10-01 RX ORDER — ACETAMINOPHEN 500 MG
650 TABLET ORAL EVERY 6 HOURS
Refills: 0 | Status: DISCONTINUED | OUTPATIENT
Start: 2023-10-01 | End: 2023-10-06

## 2023-10-01 RX ORDER — IOHEXOL 300 MG/ML
30 INJECTION, SOLUTION INTRAVENOUS ONCE
Refills: 0 | Status: DISCONTINUED | OUTPATIENT
Start: 2023-10-01 | End: 2023-10-01

## 2023-10-01 RX ORDER — DIATRIZOATE MEGLUMINE 180 MG/ML
30 INJECTION, SOLUTION INTRAVESICAL ONCE
Refills: 0 | Status: DISCONTINUED | OUTPATIENT
Start: 2023-10-01 | End: 2023-10-01

## 2023-10-01 RX ORDER — IOHEXOL 300 MG/ML
30 INJECTION, SOLUTION INTRAVENOUS ONCE
Refills: 0 | Status: COMPLETED | OUTPATIENT
Start: 2023-10-01 | End: 2023-10-01

## 2023-10-01 RX ORDER — POTASSIUM CHLORIDE 20 MEQ
40 PACKET (EA) ORAL ONCE
Refills: 0 | Status: COMPLETED | OUTPATIENT
Start: 2023-10-01 | End: 2023-10-01

## 2023-10-01 RX ORDER — MAGNESIUM SULFATE 500 MG/ML
2 VIAL (ML) INJECTION
Refills: 0 | Status: COMPLETED | OUTPATIENT
Start: 2023-10-01 | End: 2023-10-01

## 2023-10-01 RX ORDER — MEROPENEM 1 G/30ML
1000 INJECTION INTRAVENOUS EVERY 12 HOURS
Refills: 0 | Status: DISCONTINUED | OUTPATIENT
Start: 2023-10-01 | End: 2023-10-03

## 2023-10-01 RX ADMIN — IOHEXOL 30 MILLILITER(S): 300 INJECTION, SOLUTION INTRAVENOUS at 18:06

## 2023-10-01 RX ADMIN — Medication 650 MILLIGRAM(S): at 01:00

## 2023-10-01 RX ADMIN — CHLORHEXIDINE GLUCONATE 15 MILLILITER(S): 213 SOLUTION TOPICAL at 17:49

## 2023-10-01 RX ADMIN — Medication 25 GRAM(S): at 10:50

## 2023-10-01 RX ADMIN — MEROPENEM 100 MILLIGRAM(S): 1 INJECTION INTRAVENOUS at 17:49

## 2023-10-01 RX ADMIN — PANTOPRAZOLE SODIUM 40 MILLIGRAM(S): 20 TABLET, DELAYED RELEASE ORAL at 17:52

## 2023-10-01 RX ADMIN — PIPERACILLIN AND TAZOBACTAM 25 GRAM(S): 4; .5 INJECTION, POWDER, LYOPHILIZED, FOR SOLUTION INTRAVENOUS at 00:01

## 2023-10-01 RX ADMIN — PIPERACILLIN AND TAZOBACTAM 25 GRAM(S): 4; .5 INJECTION, POWDER, LYOPHILIZED, FOR SOLUTION INTRAVENOUS at 05:36

## 2023-10-01 RX ADMIN — CHLORHEXIDINE GLUCONATE 1 APPLICATION(S): 213 SOLUTION TOPICAL at 05:35

## 2023-10-01 RX ADMIN — Medication 650 MILLIGRAM(S): at 00:31

## 2023-10-01 RX ADMIN — PANTOPRAZOLE SODIUM 40 MILLIGRAM(S): 20 TABLET, DELAYED RELEASE ORAL at 05:35

## 2023-10-01 RX ADMIN — Medication 40 MILLIEQUIVALENT(S): at 06:20

## 2023-10-01 RX ADMIN — CHLORHEXIDINE GLUCONATE 15 MILLILITER(S): 213 SOLUTION TOPICAL at 05:35

## 2023-10-01 RX ADMIN — Medication 25 GRAM(S): at 12:17

## 2023-10-01 NOTE — PROGRESS NOTE ADULT - SUBJECTIVE AND OBJECTIVE BOX
GENERAL SURGERY PROGRESS NOTE     TEA ECHAVARRIA  58y  Female  Hospital day :2d    OVERNIGHT EVENTS: No acute events overnight. Patient remains intubated, on pressors. OGT w/ bilious output.     T(F): 100.7 (10-01-23 @ 00:00), Max: 100.7 (10-01-23 @ 00:00)  HR: 84 (10-01-23 @ 00:15) (45 - 145)  BP: 100/58 (10-01-23 @ 00:15) (85/51 - 141/82)  ABP: --  ABP(mean): --  RR: 16 (10-01-23 @ 00:15) (14 - 36)  SpO2: 95% (10-01-23 @ 00:15) (94% - 100%)    DIET/FLUIDS: lactated ringers. 1000 milliLiter(s) IV Continuous <Continuous>    NG:                                                                                DRAINS:     BM:     EMESIS:     URINE:    Indwelling Urethral Catheter:     Connect To:  Straight Drainage/Gravity    Indication:  Urine Output Monitoring in Critically Ill (09-30-23 @ 01:16)    GI proph:  pantoprazole  Injectable 40 milliGRAM(s) IV Push two times a day    AC/ proph:   ABx: levoFLOXacin IVPB 750 milliGRAM(s) IV Intermittent every 24 hours  piperacillin/tazobactam IVPB.. 3.375 Gram(s) IV Intermittent every 6 hours      PHYSICAL EXAM:  GENERAL: intubated, sedated.   CHEST/LUNG: Equal chest rise bilaterally  HEART: Regular rate   ABDOMEN: Soft,  Nondistended;   EXTREMITIES:  No clubbing, cyanosis,       LABS  Labs:  CAPILLARY BLOOD GLUCOSE                              12.0   15.92 )-----------( 268      ( 30 Sep 2023 04:40 )             38.2       Auto Neutrophil %: 85.7 % (09-30-23 @ 04:40)  Auto Immature Granulocyte %: 0.5 % (09-30-23 @ 04:40)    09-30    138  |  106  |  20  ----------------------------<  195<H>  4.6   |  22  |  1.1      Calcium: 8.2 mg/dL (09-30-23 @ 04:40)      LFTs:             5.9  | 0.8  | 78       ------------------[80      ( 30 Sep 2023 04:40 )  2.8  | x    | 49          Lipase:x      Amylase:x         Blood Gas Arterial, Lactate: 2.00 mmol/L (09-30-23 @ 12:53)  Lactate, Blood: 3.2 mmol/L (09-30-23 @ 04:40)  Blood Gas Arterial, Lactate: 2.40 mmol/L (09-30-23 @ 03:21)  Lactate, Blood: 5.3 mmol/L (09-29-23 @ 20:42)  Blood Gas Arterial, Lactate: 4.20 mmol/L (09-29-23 @ 14:15)  Blood Gas Arterial, Lactate: 5.70 mmol/L (09-29-23 @ 09:06)  Blood Gas Venous - Lactate: 6.70 mmol/L (09-29-23 @ 07:27)    ABG - ( 30 Sep 2023 12:53 )  pH: 7.51  /  pCO2: 32    /  pO2: 140   / HCO3: 26    / Base Excess: 3.0   /  SaO2: 99.1            ABG - ( 30 Sep 2023 03:21 )  pH: 7.47  /  pCO2: 32    /  pO2: 126   / HCO3: 23    / Base Excess: 0.3   /  SaO2: 99.5            ABG - ( 29 Sep 2023 14:15 )  pH: 7.38  /  pCO2: 34    /  pO2: 95    / HCO3: 20    / Base Excess: -4.2  /  SaO2: 98.7              Coags:     13.50  ----< 1.18    ( 29 Sep 2023 07:15 )     27.6        CARDIAC MARKERS ( 30 Sep 2023 04:40 )  x     / 0.34 ng/mL / x     / x     / x      CARDIAC MARKERS ( 29 Sep 2023 16:00 )  x     / <0.01 ng/mL / x     / x     / x              Urinalysis Basic - ( 30 Sep 2023 04:40 )    Color: x / Appearance: x / SG: x / pH: x  Gluc: 195 mg/dL / Ketone: x  / Bili: x / Urobili: x   Blood: x / Protein: x / Nitrite: x   Leuk Esterase: x / RBC: x / WBC x   Sq Epi: x / Non Sq Epi: x / Bacteria: x        Culture - Blood (collected 29 Sep 2023 09:47)  Source: .Blood Blood-Peripheral  Preliminary Report (30 Sep 2023 17:02):    No growth at 24 hours    Culture - Blood (collected 29 Sep 2023 09:47)  Source: .Blood Blood-Peripheral  Gram Stain (30 Sep 2023 23:27):    Growth in aerobic bottle: Gram Positive Rods and Gram positive cocci in    pairs  Preliminary Report (30 Sep 2023 23:27):    Growth in aerobic bottle: Gram Positive Rods and Gram positive cocci in    pairs    Direct identification is available within approximately 3-5    hours either by Blood Panel Multiplexed PCR or Direct    MALDI-TOF. Details: [email or web address]          RADIOLOGY & ADDITIONAL TESTS:  < from: Xray Chest 1 View- PORTABLE-Routine (Xray Chest 1 View- PORTABLE-Routine in AM.) (09.30.23 @ 04:37) >    Bilateral opacities unchanged. No pleural effusion or air leak    < end of copied text >

## 2023-10-01 NOTE — PROGRESS NOTE ADULT - ASSESSMENT
IMPRESSION:    Acute hypoxemic respiratory failure   Suspected upper GI bleed s/p 2 units pRBC  Possible perforation sp surgical evaluation   Severe community acquired pneumonia  polymicrobial bacteremia   Likely aspiration  NSTEMI likely type II   NELA likely prerenal improving   H/o CP  H/o seizures    PLAN:    CNS: SAT.  Assess MS     HEENT: Oral and ET tube care    PULMONARY: HOB at 45 degrees.  Vent changes  .  FiO2 40.  FU DTA.  monitor PPL and DP.  SBT     CARDIOVASCULAR: Cheetah negative.  Wean levophed.  Continue gentle hydration.  ECHO noted.      GI: Keep NPO. Protonix BID.  Surgery follow up.  GI follow up.     RENAL:  Voiding trial Bladder scans.  FU lytes.  Correct as needed.  replace K and Mg    INFECTIOUS DISEASE:  Meropenem and Levaquin for now.  Nasal MRSA negative.  Urine legionella and strep     HEMATOLOGICAL: DVT prophylaxis seq.  Monitor CBC.      ENDOCRINE:  Follow up FS.  Insulin protocol if needed.    MUSCULOSKELETAL: Bedrest.  Off loading.      Prognosis guarded.      DISPO: ICU

## 2023-10-01 NOTE — PROGRESS NOTE ADULT - SUBJECTIVE AND OBJECTIVE BOX
Patient is a 58y old  Female who presents with a chief complaint of Upper GI bleed (01 Oct 2023 00:32)        Over Night Events:  remains critically ill on MV.  Sedated.  on Levophed.          ROS:     All ROS are negative except HPI         PHYSICAL EXAM    ICU Vital Signs Last 24 Hrs  T(C): 37.6 (01 Oct 2023 04:00), Max: 38.2 (01 Oct 2023 00:00)  T(F): 99.7 (01 Oct 2023 04:00), Max: 100.7 (01 Oct 2023 00:00)  HR: 66 (01 Oct 2023 07:30) (51 - 123)  BP: 93/59 (01 Oct 2023 07:30) (85/51 - 138/66)  BP(mean): 71 (01 Oct 2023 07:30) (62 - 95)  ABP: --  ABP(mean): --  RR: 16 (01 Oct 2023 07:30) (14 - 36)  SpO2: 99% (01 Oct 2023 07:30) (94% - 100%)    O2 Parameters below as of 01 Oct 2023 08:00  Patient On (Oxygen Delivery Method): ventilator    O2 Concentration (%): 40        CONSTITUTIONAL:  In NAD    ENT:   Airway patent,   Mouth with normal mucosa.   ET     EYES:   Pupils equal,   Round and reactive to light.    CARDIAC:   Normal rate,   Regular rhythm.    No edema    RESPIRATORY:   No wheezing  Bilateral BS  Normal chest expansion  Not tachypneic,  No use of accessory muscles    GASTROINTESTINAL:  Abdomen soft,   Non-tender,   No guarding,   + BS    MUSCULOSKELETAL:   Range of motion is not limited,  No clubbing, cyanosis    NEUROLOGICAL:   Sedated  No motor  deficits.    SKIN:   Skin normal color for race,   No evidence of rash.          09-30-23 @ 07:01  -  10-01-23 @ 07:00  --------------------------------------------------------  IN:    FentaNYL: 77.7 mL    IV PiggyBack: 550 mL    Lactated Ringers: 1680 mL    Norepinephrine: 156.4 mL    Norepinephrine: 112.2 mL    Pantoprazole: 10 mL    Sodium Chloride 0.9% Bolus: 250 mL  Total IN: 2836.3 mL    OUT:    Indwelling Catheter - Urethral (mL): 1315 mL    Other (mL): 0 mL    Propofol: 0 mL    Vasopressin: 0 mL  Total OUT: 1315 mL    Total NET: 1521.3 mL          LABS:                            10.2   13.55 )-----------( 171      ( 01 Oct 2023 04:43 )             31.4                                               10-01    137  |  103  |  11  ----------------------------<  148<H>  3.4<L>   |  25  |  0.8    Ca    8.0<L>      01 Oct 2023 04:43  Mg     1.6     10-01    TPro  5.0<L>  /  Alb  2.2<L>  /  TBili  0.6  /  DBili  x   /  AST  31  /  ALT  29  /  AlkPhos  73  10-01                                             Urinalysis Basic - ( 01 Oct 2023 04:43 )    Color: x / Appearance: x / SG: x / pH: x  Gluc: 148 mg/dL / Ketone: x  / Bili: x / Urobili: x   Blood: x / Protein: x / Nitrite: x   Leuk Esterase: x / RBC: x / WBC x   Sq Epi: x / Non Sq Epi: x / Bacteria: x        CARDIAC MARKERS ( 01 Oct 2023 04:43 )  x     / 0.11 ng/mL / x     / x     / x      CARDIAC MARKERS ( 30 Sep 2023 04:40 )  x     / 0.34 ng/mL / x     / x     / x      CARDIAC MARKERS ( 29 Sep 2023 16:00 )  x     / <0.01 ng/mL / x     / x     / x                                                LIVER FUNCTIONS - ( 01 Oct 2023 04:43 )  Alb: 2.2 g/dL / Pro: 5.0 g/dL / ALK PHOS: 73 U/L / ALT: 29 U/L / AST: 31 U/L / GGT: x                                                  Culture - Urine (collected 29 Sep 2023 10:10)  Source: Clean Catch Clean Catch (Midstream)  Final Report (01 Oct 2023 01:13):    <10,000 CFU/mL Normal Urogenital Mili    Culture - Blood (collected 29 Sep 2023 09:47)  Source: .Blood Blood-Peripheral  Preliminary Report (30 Sep 2023 17:02):    No growth at 24 hours    Culture - Blood (collected 29 Sep 2023 09:47)  Source: .Blood Blood-Peripheral  Gram Stain (30 Sep 2023 23:27):    Growth in aerobic bottle: Gram Positive Rods and Gram positive cocci in    pairs  Preliminary Report (30 Sep 2023 23:27):    Growth in aerobic bottle: Gram Positive Rods and Gram positive cocci in    pairs    Direct identification is available within approximately 3-5    hours either by Blood Panel Multiplexed PCR or Direct    MALDI-TOF. Details: [email or web address]  Organism: Blood Culture PCR (01 Oct 2023 01:07)  Organism: Blood Culture PCR (01 Oct 2023 01:07)                                                   Mode: AC/ CMV (Assist Control/ Continuous Mandatory Ventilation)  RR (machine): 16  TV (machine): 400  FiO2: 40  PEEP: 8  ITime: 0.9  MAP: 14  PIP: 34                                      ABG - ( 01 Oct 2023 03:48 )  pH, Arterial: 7.50  pH, Blood: x     /  pCO2: 37    /  pO2: 116   / HCO3: 29    / Base Excess: 5.5   /  SaO2: 99.5   lac 1.5               MEDICATIONS  (STANDING):  chlorhexidine 0.12% Liquid 15 milliLiter(s) Oral Mucosa every 12 hours  chlorhexidine 2% Cloths 1 Application(s) Topical <User Schedule>  fentaNYL   Infusion. 0.5 MICROgram(s)/kG/Hr (2.77 mL/Hr) IV Continuous <Continuous>  gabapentin 600 milliGRAM(s) Oral daily  lactated ringers. 1000 milliLiter(s) (70 mL/Hr) IV Continuous <Continuous>  levoFLOXacin IVPB 750 milliGRAM(s) IV Intermittent every 24 hours  magnesium sulfate  IVPB 2 Gram(s) IV Intermittent every 2 hours  norepinephrine Infusion 0.05 MICROgram(s)/kG/Min (5.18 mL/Hr) IV Continuous <Continuous>  pantoprazole  Injectable 40 milliGRAM(s) IV Push two times a day  piperacillin/tazobactam IVPB.. 3.375 Gram(s) IV Intermittent every 6 hours    MEDICATIONS  (PRN):  acetaminophen   Oral Liquid .. 650 milliGRAM(s) Enteral Tube every 6 hours PRN Temp greater or equal to 38C (100.4F)      New X-rays reviewed:                                                                                  ECHO    CXR interpreted by me:

## 2023-10-01 NOTE — PROGRESS NOTE ADULT - ASSESSMENT
{\rtf1\dsuauj74799\ansi\ecdmggh9711\ftnbj\uc1\deff0  {\fonttbl{\f0 \fnil Segoe UI;}{\f1 \fnil \fcharset0 Segoe UI;}{\f2 \fnil Times New Justice;}}  {\colortbl ;\hau767\sxofi294\fzch360 ;\red0\green0\blue0 ;\red0\green0\dcph070 ;\red0\green0\blue0 ;}  {\stylesheet{\f0\fs20 Normal;}{\cs1 Default Paragraph Font;}{\cs2\f0\fs16 Line Number;}{\cs3\f2\fs24\ul\cf3 Hyperlink;}}  {\*\revtbl{Unknown;}}  \zfqozt40848\naqgbb78582\bpxqt9246\ekstu7302\taxoe0033\crvlr5207\swclrhu145\svnttae822\nogrowautofit\fbpgrn904\formshade\nofeaturethrottle1\dntblnsbdb\fet4\aendnotes\aftnnrlc\pgbrdrhead\pgbrdrfoot  \sectd\yggrak75727\xtwkjm51089\guttersxn0\mwtcvncm6510\hxwedegj4051\jsnbmceh2626\kolikzki7848\jhoojrn756\jijvdel560\sbkpage\pgncont\pgndec  \plain\plain\f0\fs24\ql\plain\f0\fs24\plain\f1\fs16\ltaq7837\hich\f1\dbch\f1\loch\f1\cf2\fs16 58 year old female with a PMHx of Down syndrome, nonverbal at baseline, hypothyroidism, cerebral palsy, congenital pulmonary stenosis presents to the ED for   hemoptysis.\par  \par  #Hypovolemic and septic shock\par  #Acute upper GI bleed s/p 2 units pRBC\par  #Possible perforation sp surgical evaluation\par  - CTA: foci of air in the stomach, consistent with duodenal perforation\par  - Hb 9.9, s/p 2u pRBC, Hb 14.7 > 12\par  - Surgery: no surgical intervention at this time\par  - wean levophed if possible\par  - LR @ 70\par  - Trend lactate\par  - Monitor CBC\par  - Echo f/u\par  - c/w protonix BID\par  \par  #Pneumonia, in the setting of recent hospitalization\par  #Likely aspiration\par  #Acute hypoxemic respiratory failure \par  - CXR b/l opacities\par  - ABG respiratory acidosis\par  - MRSA negative\par  - f/u BCx\par  - wean down FiO2 for a saturation above 92%\par  - c/w propofol and fentanyl, keep sedated for now\par  - started Zosyn + Levaquin\par  \par  \par  #NELA likely prerenal, resolved\par  - Cr 1.6 > 1.2 > 1.1\par  - voiding trial\par  \par  \par  #NSTEMI likely type II\par  - EKG Sinus tachycardia, ST elevation, consider early repolarization\par  - Echo f/u\par  - Trend trop: 0.34\par  \par  \par  - DVT ppx: SCD\par  - Diet: NPO\par  - Activity: Bedrest\par  \par  \par  \par  \par  \plain\f1\fs16\efqd5389\hich\f1\dbch\f1\loch\f1\cf2\fs16\strike\plain\f1\fs16\babc9492\hich\f1\dbch\f1\loch\f1\cf2\fs16\plain\f0\fs20\qqjq4297\hich\f0\dbch\f0\loch\f0\fs20\par  }

## 2023-10-01 NOTE — PROGRESS NOTE ADULT - ASSESSMENT
TEA ECHAVARRIA is a 58F with PMH of Down syndrome, cerebral palsy, severe intellectual disability, nonverbal at baseline, hypothyroidism, congenital pulmonary stenosis, chronic pressure ulcers, orthostatic hypotension, constipation, presents to the ED for coffee ground emesis and AMS. Patient was recently admitted to the hospital for management of pneumonia and resultant hypoxic respiratory failure. Patient admitted to MICU for presumptive upper GI bleed. Surgery consulted for CT findings of duodenal perforation.    PLAN:  #Contained D3 Perforation  -per GI- recommend CTAP with IV conc per MICU and sepsis work up, EKG, Troponin workup  - Maintain active Type and screen  - Monitor Hgb, transfuse > 7 as needed  - Trend H&H BID  - intubated and sedated- vent 50/8    - No acute surgical intervention at this time   - Monitor OG tube outputs-   - continue ppi gtt   - Continue IV abx,- on meropenam    - Strict I/O   - Surgery following

## 2023-10-02 DIAGNOSIS — K63.1 PERFORATION OF INTESTINE (NONTRAUMATIC): ICD-10-CM

## 2023-10-02 DIAGNOSIS — F79 UNSPECIFIED INTELLECTUAL DISABILITIES: ICD-10-CM

## 2023-10-02 DIAGNOSIS — Z51.5 ENCOUNTER FOR PALLIATIVE CARE: ICD-10-CM

## 2023-10-02 LAB
ALBUMIN SERPL ELPH-MCNC: 2.6 G/DL — LOW (ref 3.5–5.2)
ALP SERPL-CCNC: 107 U/L — SIGNIFICANT CHANGE UP (ref 30–115)
ALT FLD-CCNC: 23 U/L — SIGNIFICANT CHANGE UP (ref 0–41)
ANION GAP SERPL CALC-SCNC: 9 MMOL/L — SIGNIFICANT CHANGE UP (ref 7–14)
AST SERPL-CCNC: 24 U/L — SIGNIFICANT CHANGE UP (ref 0–41)
BASE EXCESS BLDA CALC-SCNC: 4 MMOL/L — HIGH (ref -2–3)
BASE EXCESS BLDA CALC-SCNC: 6.6 MMOL/L — HIGH (ref -2–3)
BASOPHILS # BLD AUTO: 0.04 K/UL — SIGNIFICANT CHANGE UP (ref 0–0.2)
BASOPHILS # BLD AUTO: 0.06 K/UL — SIGNIFICANT CHANGE UP (ref 0–0.2)
BASOPHILS NFR BLD AUTO: 0.4 % — SIGNIFICANT CHANGE UP (ref 0–1)
BASOPHILS NFR BLD AUTO: 0.5 % — SIGNIFICANT CHANGE UP (ref 0–1)
BILIRUB SERPL-MCNC: 0.4 MG/DL — SIGNIFICANT CHANGE UP (ref 0.2–1.2)
BLD GP AB SCN SERPL QL: SIGNIFICANT CHANGE UP
BUN SERPL-MCNC: 8 MG/DL — LOW (ref 10–20)
CALCIUM SERPL-MCNC: 7.8 MG/DL — LOW (ref 8.4–10.5)
CHLORIDE SERPL-SCNC: 102 MMOL/L — SIGNIFICANT CHANGE UP (ref 98–110)
CO2 SERPL-SCNC: 25 MMOL/L — SIGNIFICANT CHANGE UP (ref 17–32)
CREAT SERPL-MCNC: 0.6 MG/DL — LOW (ref 0.7–1.5)
EGFR: 105 ML/MIN/1.73M2 — SIGNIFICANT CHANGE UP
EOSINOPHIL # BLD AUTO: 0.24 K/UL — SIGNIFICANT CHANGE UP (ref 0–0.7)
EOSINOPHIL # BLD AUTO: 0.29 K/UL — SIGNIFICANT CHANGE UP (ref 0–0.7)
EOSINOPHIL NFR BLD AUTO: 2 % — SIGNIFICANT CHANGE UP (ref 0–8)
EOSINOPHIL NFR BLD AUTO: 2.7 % — SIGNIFICANT CHANGE UP (ref 0–8)
GLUCOSE SERPL-MCNC: 73 MG/DL — SIGNIFICANT CHANGE UP (ref 70–99)
HCO3 BLDA-SCNC: 29 MMOL/L — HIGH (ref 21–28)
HCO3 BLDA-SCNC: 29 MMOL/L — HIGH (ref 21–28)
HCT VFR BLD CALC: 29.7 % — LOW (ref 37–47)
HCT VFR BLD CALC: 31.8 % — LOW (ref 37–47)
HGB BLD-MCNC: 10.1 G/DL — LOW (ref 12–16)
HGB BLD-MCNC: 9.5 G/DL — LOW (ref 12–16)
HOROWITZ INDEX BLDA+IHG-RTO: 40 — SIGNIFICANT CHANGE UP
IMM GRANULOCYTES NFR BLD AUTO: 0.5 % — HIGH (ref 0.1–0.3)
IMM GRANULOCYTES NFR BLD AUTO: 0.5 % — HIGH (ref 0.1–0.3)
LEGIONELLA AG UR QL: NEGATIVE — SIGNIFICANT CHANGE UP
LYMPHOCYTES # BLD AUTO: 0.97 K/UL — LOW (ref 1.2–3.4)
LYMPHOCYTES # BLD AUTO: 1.12 K/UL — LOW (ref 1.2–3.4)
LYMPHOCYTES # BLD AUTO: 8.9 % — LOW (ref 20.5–51.1)
LYMPHOCYTES # BLD AUTO: 9.3 % — LOW (ref 20.5–51.1)
MAGNESIUM SERPL-MCNC: 2.1 MG/DL — SIGNIFICANT CHANGE UP (ref 1.8–2.4)
MCHC RBC-ENTMCNC: 22.6 PG — LOW (ref 27–31)
MCHC RBC-ENTMCNC: 23.2 PG — LOW (ref 27–31)
MCHC RBC-ENTMCNC: 31.8 G/DL — LOW (ref 32–37)
MCHC RBC-ENTMCNC: 32 G/DL — SIGNIFICANT CHANGE UP (ref 32–37)
MCV RBC AUTO: 71.1 FL — LOW (ref 81–99)
MCV RBC AUTO: 72.4 FL — LOW (ref 81–99)
MONOCYTES # BLD AUTO: 0.41 K/UL — SIGNIFICANT CHANGE UP (ref 0.1–0.6)
MONOCYTES # BLD AUTO: 0.45 K/UL — SIGNIFICANT CHANGE UP (ref 0.1–0.6)
MONOCYTES NFR BLD AUTO: 3.4 % — SIGNIFICANT CHANGE UP (ref 1.7–9.3)
MONOCYTES NFR BLD AUTO: 4.1 % — SIGNIFICANT CHANGE UP (ref 1.7–9.3)
NEUTROPHILS # BLD AUTO: 10.12 K/UL — HIGH (ref 1.4–6.5)
NEUTROPHILS # BLD AUTO: 9.12 K/UL — HIGH (ref 1.4–6.5)
NEUTROPHILS NFR BLD AUTO: 83.4 % — HIGH (ref 42.2–75.2)
NEUTROPHILS NFR BLD AUTO: 84.3 % — HIGH (ref 42.2–75.2)
NRBC # BLD: 0 /100 WBCS — SIGNIFICANT CHANGE UP (ref 0–0)
NRBC # BLD: 0 /100 WBCS — SIGNIFICANT CHANGE UP (ref 0–0)
PCO2 BLDA: 34 MMHG — SIGNIFICANT CHANGE UP (ref 25–48)
PCO2 BLDA: 45 MMHG — SIGNIFICANT CHANGE UP (ref 25–48)
PH BLDA: 7.42 — SIGNIFICANT CHANGE UP (ref 7.35–7.45)
PH BLDA: 7.54 — HIGH (ref 7.35–7.45)
PLATELET # BLD AUTO: 169 K/UL — SIGNIFICANT CHANGE UP (ref 130–400)
PLATELET # BLD AUTO: 193 K/UL — SIGNIFICANT CHANGE UP (ref 130–400)
PMV BLD: 10.8 FL — HIGH (ref 7.4–10.4)
PMV BLD: 9.8 FL — SIGNIFICANT CHANGE UP (ref 7.4–10.4)
PO2 BLDA: 106 MMHG — SIGNIFICANT CHANGE UP (ref 83–108)
PO2 BLDA: 89 MMHG — SIGNIFICANT CHANGE UP (ref 83–108)
POTASSIUM SERPL-MCNC: 3.8 MMOL/L — SIGNIFICANT CHANGE UP (ref 3.5–5)
POTASSIUM SERPL-SCNC: 3.8 MMOL/L — SIGNIFICANT CHANGE UP (ref 3.5–5)
PROT SERPL-MCNC: 5 G/DL — LOW (ref 6–8)
RBC # BLD: 4.1 M/UL — LOW (ref 4.2–5.4)
RBC # BLD: 4.47 M/UL — SIGNIFICANT CHANGE UP (ref 4.2–5.4)
RBC # FLD: 18.6 % — HIGH (ref 11.5–14.5)
RBC # FLD: 18.6 % — HIGH (ref 11.5–14.5)
SAO2 % BLDA: 97.6 % — SIGNIFICANT CHANGE UP (ref 94–98)
SAO2 % BLDA: 99 % — HIGH (ref 94–98)
SODIUM SERPL-SCNC: 136 MMOL/L — SIGNIFICANT CHANGE UP (ref 135–146)
WBC # BLD: 10.92 K/UL — HIGH (ref 4.8–10.8)
WBC # BLD: 12.01 K/UL — HIGH (ref 4.8–10.8)
WBC # FLD AUTO: 10.92 K/UL — HIGH (ref 4.8–10.8)
WBC # FLD AUTO: 12.01 K/UL — HIGH (ref 4.8–10.8)

## 2023-10-02 PROCEDURE — 99232 SBSQ HOSP IP/OBS MODERATE 35: CPT

## 2023-10-02 PROCEDURE — 97597 DBRDMT OPN WND 1ST 20 CM/<: CPT

## 2023-10-02 PROCEDURE — 99291 CRITICAL CARE FIRST HOUR: CPT

## 2023-10-02 PROCEDURE — 71045 X-RAY EXAM CHEST 1 VIEW: CPT | Mod: 26

## 2023-10-02 PROCEDURE — 99233 SBSQ HOSP IP/OBS HIGH 50: CPT

## 2023-10-02 PROCEDURE — 99223 1ST HOSP IP/OBS HIGH 75: CPT

## 2023-10-02 RX ORDER — SODIUM CHLORIDE 9 MG/ML
250 INJECTION INTRAMUSCULAR; INTRAVENOUS; SUBCUTANEOUS ONCE
Refills: 0 | Status: DISCONTINUED | OUTPATIENT
Start: 2023-10-02 | End: 2023-10-02

## 2023-10-02 RX ORDER — SODIUM CHLORIDE 9 MG/ML
250 INJECTION, SOLUTION INTRAVENOUS ONCE
Refills: 0 | Status: COMPLETED | OUTPATIENT
Start: 2023-10-02 | End: 2023-10-02

## 2023-10-02 RX ORDER — SODIUM CHLORIDE 9 MG/ML
500 INJECTION, SOLUTION INTRAVENOUS ONCE
Refills: 0 | Status: COMPLETED | OUTPATIENT
Start: 2023-10-02 | End: 2023-10-02

## 2023-10-02 RX ORDER — FENTANYL CITRATE 50 UG/ML
2 INJECTION INTRAVENOUS
Qty: 2500 | Refills: 0 | Status: DISCONTINUED | OUTPATIENT
Start: 2023-10-02 | End: 2023-10-04

## 2023-10-02 RX ORDER — FENTANYL CITRATE 50 UG/ML
0.5 INJECTION INTRAVENOUS
Qty: 2500 | Refills: 0 | Status: DISCONTINUED | OUTPATIENT
Start: 2023-10-02 | End: 2023-10-02

## 2023-10-02 RX ADMIN — SODIUM CHLORIDE 1000 MILLILITER(S): 9 INJECTION, SOLUTION INTRAVENOUS at 11:00

## 2023-10-02 RX ADMIN — PANTOPRAZOLE SODIUM 40 MILLIGRAM(S): 20 TABLET, DELAYED RELEASE ORAL at 05:50

## 2023-10-02 RX ADMIN — GABAPENTIN 600 MILLIGRAM(S): 400 CAPSULE ORAL at 12:47

## 2023-10-02 RX ADMIN — MEROPENEM 100 MILLIGRAM(S): 1 INJECTION INTRAVENOUS at 05:58

## 2023-10-02 RX ADMIN — PANTOPRAZOLE SODIUM 40 MILLIGRAM(S): 20 TABLET, DELAYED RELEASE ORAL at 17:37

## 2023-10-02 RX ADMIN — SODIUM CHLORIDE 1000 MILLILITER(S): 9 INJECTION, SOLUTION INTRAVENOUS at 15:00

## 2023-10-02 RX ADMIN — CHLORHEXIDINE GLUCONATE 1 APPLICATION(S): 213 SOLUTION TOPICAL at 05:57

## 2023-10-02 RX ADMIN — MEROPENEM 100 MILLIGRAM(S): 1 INJECTION INTRAVENOUS at 17:37

## 2023-10-02 RX ADMIN — SODIUM CHLORIDE 1000 MILLILITER(S): 9 INJECTION, SOLUTION INTRAVENOUS at 14:39

## 2023-10-02 RX ADMIN — SODIUM CHLORIDE 500 MILLILITER(S): 9 INJECTION, SOLUTION INTRAVENOUS at 12:47

## 2023-10-02 RX ADMIN — CHLORHEXIDINE GLUCONATE 15 MILLILITER(S): 213 SOLUTION TOPICAL at 17:37

## 2023-10-02 RX ADMIN — CHLORHEXIDINE GLUCONATE 15 MILLILITER(S): 213 SOLUTION TOPICAL at 05:50

## 2023-10-02 NOTE — CONSULT NOTE ADULT - PROBLEM SELECTOR RECOMMENDATION 2
-history of Down syndrome and cerebral palsy with severe intellectual disability  -Patient lives in a group home  -Reached out to [facility] about who possible decision makers may be.  Awaiting return phone call.   -Any decisions about withdrawing care must go through [facility].

## 2023-10-02 NOTE — CONSULT NOTE ADULT - NS ATTEST RISK PROBLEM GEN_ALL_CORE FT
1. Number and complexity of problems addressed for this patient:    1.1 Moderate (At least 1)  [ ] 1 or more chronic illnesses with exacerbation, progression, or side effects of treatment  [ ] 2 or more stable chronic illnesses  [ ] 1 undiagnosed new problem with uncertain prognosis  [ ] 1 acute illness with systemic symptoms  [ ] 1 acute complicated injury  1.2 High (At least 1)   [ ] 1 or more chronic illnesses with severe exacerbation, progression, or side effects of treatment  [x ] 1 acute or chronic illnesses or injuries that may pose a threat to life or bodily function    2. Amount and/or Complexity of Data that was Reviewed and Analyzed for this case:       Moderate (1 out of 3)       High (2 out of 3)  2.1. (Any combination of 3 of the following)   [ ] Prior External notes were reviewed  [ ] Each test result was reviewed (see "LABS" and "RADIOLOGY & ADDITIONAL STUDIES" above)  [ ] The following tests were ordered and/or reviewed (Only count 1 point for ordering or reviewing a unique test):  	[ ]CBC  	[ ] Chemistry   	[ ] Imaging   	[ ] Other:   [ ] Assessment requiring an independent historian   		Name of historian and relationship:   2.2  [x ] Personally review and interpretation of  image or testing   2.3  [ x] Discussion of management or test interpretation with external physician/other qualified health care professional\appropriate source (not separately reported)    3. Risk of Complications and/or Morbidity or Mortality of for this Patient’s Management:  3.1 Moderate risk of morbidity from additional diagnostic testing or treatment (At least 1):   [ ] Prescription drug management   [ ] Decision regarding minor surgery, treatment, or procedure with identified patient or procedure risk factors  [ ] Decision regarding elective major surgery, treatment, or procedure without identified patient or procedure risk factors   [ ] Diagnosis or treatment significantly limited by social determinants of health   [ ] Other:   3.2 High risk of morbidity from additional diagnostic testing or treatment (At least 1):   [ ] Drug therapy requiring intensive monitoring for toxicity   [ ] Decision regarding elective major surgery, treatment, or procedure with identified patient or procedure risk factors   [ ] Decision regarding emergency major surgery, treatment, or procedure   [ ] Decision regarding hospitalization or escalation of hospital-level of care  [ ] Decision not to resuscitate, not to intubate, or to de-escalate care because of poor prognosis   [ ] Decision to proceed or not with artificial nutrition   [ ] Parenteral controlled substance  [ ] Other:

## 2023-10-02 NOTE — CONSULT NOTE ADULT - ASSESSMENT
Ass/ Rec:  Left hand edema    Wound care - Please wash wound with soap and water, apply bacitracin, cover xeroform, wrap with cling twice a day.   No Surgical debridement needed at this time  Elevation   neurovascular checks     Remainder of care per primary team

## 2023-10-02 NOTE — CONSULT NOTE ADULT - SUBJECTIVE AND OBJECTIVE BOX
CC:   hemoptysis    HPI:  58 year old female with a PMHx of Down syndrome, nonverbal at baseline, hypothyroidism, cerebral palsy, congenital pulmonary stenosis presents to the ED for hemoptysis.    Patient was recently admitted to the hospital for management of pneumonia and resultant hypoxic respiratory failure.  Patient was noted of having hematemesis when she was admitted previously, a CT of the abdomen showed esophagitis for which GI recommended OP upper endoscopy.    On the day of presentation, the patient was less responsive with noted hemoptysis.   When asking the group home personnel, patient was not exhibiting any signs of respiratory distress, abdominal pain, cough, shortness of breath, fevers/ sweating, or any changes in bowel habits.    In the ED, the patient was HD stable, afebrile.  Labs workup revealed Hb= 9.9, WBC= 11k, plt= 432, creatinine= 1.6.  pH= 7.19/ CO2= 55/ O2= 188/ 21.    Patient received 2 PRBCs, got intubated for airway protection where blood was noted in the trachea.  A CT of the abdomen showed foci of air in the lumen of the stomach, consistent with duodenal perforation.  The CT showed as well bibasilar infiltrates, consistent with pneumonia.    Surgery were consulted, as well as GI.  Patient was started on Protonix drip, and got admitted for management. (29 Sep 2023 13:51)    PERTINENT PM/SXH:   Down syndrome    Osteoporosis    Mild anemia    Neuropathy      No significant past surgical history    S/P debridement      FAMILY HISTORY:    Unable to determine from patient was intubated    ITEMS NOT CHECKED ARE NOT PRESENT    SOCIAL HISTORY:   Significant other/partner[ ]  Children[ ]  Congregational/Spirituality:  Substance hx:  [ ]   Tobacco hx:  [ ]   Alcohol hx: [ ]   Living Situation: [ ]Home  [ ]Long term care  [ ]Rehab [x ]Other - group home  Home Services: [ ] HHA [ ] Desi RN [ ] Hospice  Occupation:  Home Opioid hx:  [ ] Y [ ] N [x ] I-Stop Reference No:  Reference #: [id number] - no meds listed     ADVANCE DIRECTIVES:     [x ] Full Code [ ] DNR  MOLST  [ ]  Living Will  [ ]   DECISION MAKER(s):  [ ] Health Care Proxy(s)  [ ] Surrogate(s)  [ ] Guardian           Name(s): Phone Number(s):      BASELINE (I)ADL(s) (prior to admission):    [place]: [ ]Total  [ ] Moderate [ ]Dependent  Palliative Performance Status Version 2:         %    [email or web address]    Allergies    No Known Allergies    Intolerances    MEDICATIONS  (STANDING):  chlorhexidine 0.12% Liquid 15 milliLiter(s) Oral Mucosa every 12 hours  chlorhexidine 2% Cloths 1 Application(s) Topical <User Schedule>  fentaNYL   Infusion. 0.501 MICROgram(s)/kG/Hr (2.77 mL/Hr) IV Continuous <Continuous>  gabapentin 600 milliGRAM(s) Oral daily  lactated ringers Bolus 500 milliLiter(s) IV Bolus once  lactated ringers. 1000 milliLiter(s) (50 mL/Hr) IV Continuous <Continuous>  levoFLOXacin IVPB 750 milliGRAM(s) IV Intermittent every 48 hours  meropenem  IVPB 1000 milliGRAM(s) IV Intermittent every 12 hours  norepinephrine Infusion 0.05 MICROgram(s)/kG/Min (5.18 mL/Hr) IV Continuous <Continuous>  pantoprazole  Injectable 40 milliGRAM(s) IV Push two times a day    MEDICATIONS  (PRN):  acetaminophen   Oral Liquid .. 650 milliGRAM(s) Enteral Tube every 6 hours PRN Temp greater or equal to 38C (100.4F)    PRESENT SYMPTOMS: [x ]Unable to obtain due to poor mentation   Source if other than patient:  [ ]Family   [ ]Team     Pain: [ ]yes [ ]no  QOL impact -   Location -                    Aggravating factors -  Quality -  Radiation -  Timing-  Severity (0-10 scale):  Minimal acceptable level (0-10 scale):     CPOT:  0  https://www.[facility].org/getattachment/epj93p06-9q3l-6f5p-1k0y-7945k2027g7p/Critical-Care-Pain-Observation-Tool-(CPOT)    PAIN AD Score:   http://geriatrictoolkit.[employer].edu/cog/painad.pdf (press ctrl +  left click to view)    Dyspnea:                           [ ]None[ ]Mild [ ]Moderate [ ]Severe     Respiratory Distress Observation Scale (RDOS):   A score of 0 to 2 signifies little or no respiratory distress, 3 signifies mild distress, scores 4 to 6 indicate moderate distress, and scores greater than 7 signify severe distress  [email or web address]/[email or web address]/[email or web address]-[email or web address]-kyexgyge-ndycpoefpxi-[email or web address].pdf    Anxiety:                             [ ]None[ ]Mild [ ]Moderate [ ]Severe   Fatigue:                             [ ]None[ ]Mild [ ]Moderate [ ]Severe   Nausea:                             [ ]None[ ]Mild [ ]Moderate [ ]Severe   Loss of appetite:              [ ]None[ ]Mild [ ]Moderate [ ]Severe   Constipation:                    [ ]None[ ]Mild [ ]Moderate [ ]Severe    Other Symptoms:  [ ]All other review of systems negative     Palliative Performance Status Version 2:         10%    [email or web address]    PHYSICAL EXAM:  Vital Signs Last 24 Hrs  T(C): 35.9 (02 Oct 2023 08:00), Max: 37.6 (02 Oct 2023 04:00)  T(F): 96.6 (02 Oct 2023 08:00), Max: 99.6 (02 Oct 2023 04:00)  HR: 55 (02 Oct 2023 14:40) (45 - 131)  BP: 99/58 (02 Oct 2023 13:30) (84/51 - 169/75)  BP(mean): 75 (02 Oct 2023 13:30) (64 - 108)  RR: 16 (02 Oct 2023 13:30) (13 - 31)  SpO2: 100% (02 Oct 2023 14:40) (70% - 100%)    Parameters below as of 02 Oct 2023 12:00  Patient On (Oxygen Delivery Method): ventilator    O2 Concentration (%): 40 I&O's Summary    01 Oct 2023 07:01  -  02 Oct 2023 07:00  --------------------------------------------------------  IN: 1811.6 mL / OUT: 1915 mL / NET: -103.4 mL    02 Oct 2023 07:01  -  02 Oct 2023 15:16  --------------------------------------------------------  IN: 1393.5 mL / OUT: 300 mL / NET: 1093.5 mL        GENERAL:  [ ] No acute distress [ ]Lethargic  [ ]Unarousable  [ ]Verbal  [ ]Non-Verbal [ ]Cachexia    BEHAVIORAL/PSYCH:  [ ]Alert and Oriented x  [ ] Anxiety [ ] Delirium [ ] Agitation [ ] Calm   EYES: [ ] No scleral icterus [ ] Scleral icterus [ ] Closed  ENMT:  [ ]Dry mouth  [ ]No external oral lesions [ ] No external ear or nose lesions  CARDIOVASCULAR:  [ ]Regular [ ]Irregular [ ]Tachy [ ]Not Tachy  [ ]Raheem [ ] Edema [ ] No edema  PULMONARY:  [ ]Tachypnea  [ ]Audible excessive secretions [ ] No labored breathing [ ] labored breathing  GASTROINTESTINAL: [ ]Soft  [ ]Distended  [ ]Not distended [ ]Non tender [ ]Tender  MUSCULOSKELETAL: [ ]No clubbing [ ] clubbing  [ ] No cyanosis [ ] cyanosis  NEUROLOGIC: [ ]No focal deficits  [ ]Follows commands  [ ]Does not follow commands  [ ]Cognitive impairment  [ ]Dysphagia  [ ]Dysarthria  [ ]Paresis   SKIN: [ ] Jaundiced [ ] Non-jaundiced [ ]Rash [ ]No Rash [ ] Warm [ ] Dry  MISC/LINES: [ ] ET tube [ ] Trach [ ]NGT/OGT [ ]PEG [ ]Madsen    LABS: reviewed by me                        9.5    10.92 )-----------( 193      ( 02 Oct 2023 04:38 )             29.7   10-02    136  |  102  |  8<L>  ----------------------------<  73  3.8   |  25  |  0.6<L>    Ca    7.8<L>      02 Oct 2023 04:38  Mg     2.1     10-02    TPro  5.0<L>  /  Alb  2.6<L>  /  TBili  0.4  /  DBili  x   /  AST  24  /  ALT  23  /  AlkPhos  107  10-02      Urinalysis Basic - ( 02 Oct 2023 04:38 )    Color: x / Appearance: x / SG: x / pH: x  Gluc: 73 mg/dL / Ketone: x  / Bili: x / Urobili: x   Blood: x / Protein: x / Nitrite: x   Leuk Esterase: x / RBC: x / WBC x   Sq Epi: x / Non Sq Epi: x / Bacteria: x      RADIOLOGY & ADDITIONAL STUDIES: reviewed by me    EKG: reviewed by me      PROTEIN CALORIE MALNUTRITION PRESENT: [ ]mild [ ]moderate [ ]severe [ ]underweight [ ]morbid obesity  https://www.andeal.org/vault/2440/web/files/ONC/Table_Clinical%20Characteristics%20to%20Document%20Malnutrition-White%20JV%20et%20al%202012.pdf    Height (cm): 142.2 (08-12-23 @ 00:41), 142.2 (08-04-23 @ 09:15)  Weight (kg): 52.1 (08-15-23 @ 00:00), 74.4 (08-11-23 @ 21:15)  BMI (kg/m2): 25.8 (08-15-23 @ 00:00), 36.8 (08-12-23 @ 00:41), 36.8 (08-11-23 @ 21:15)  [ ]PPSV2 < or = to 30% [ ]significant weight loss  [ ]poor nutritional intake  [ ]anasarca      [ ]Artificial Nutrition      Palliative Care Spiritual/Emotional Screening Tool Question  Severity (0-4):                    OR                    [ x] Unable to determine. Will assess at later time if appropriate.  Score of 2 or greater indicates recommendation of Chaplaincy and/or SW referral  Chaplaincy Referral: [ ] Yes [ ] Refused [ ] Following     Caregiver [place]:  [ ] Yes [ ] No    OR    [x ] Unable to determine. Will assess at later time if appropriate.  Social Work Referral [ ]  Patient and Family Centered Care Referral [ ]    Anticipatory Grief Present: [ ] Yes [ ] No    OR     [ x] Unable to determine. Will assess at later time if appropriate.  Social Work Referral [ ]  Patient and Family Centered Care Referral [ ]    Patient discussed with primary medical team MD  Palliative care education provided to patient and/or family   CC:   hemoptysis    HPI:  58 year old female with a PMHx of Down syndrome, nonverbal at baseline, hypothyroidism, cerebral palsy, congenital pulmonary stenosis presents to the ED for hemoptysis.    Patient was recently admitted to the hospital for management of pneumonia and resultant hypoxic respiratory failure.  Patient was noted of having hematemesis when she was admitted previously, a CT of the abdomen showed esophagitis for which GI recommended OP upper endoscopy.    On the day of presentation, the patient was less responsive with noted hemoptysis.   When asking the group home personnel, patient was not exhibiting any signs of respiratory distress, abdominal pain, cough, shortness of breath, fevers/ sweating, or any changes in bowel habits.    In the ED, the patient was HD stable, afebrile.  Labs workup revealed Hb= 9.9, WBC= 11k, plt= 432, creatinine= 1.6.  pH= 7.19/ CO2= 55/ O2= 188/ 21.    Patient received 2 PRBCs, got intubated for airway protection where blood was noted in the trachea.  A CT of the abdomen showed foci of air in the lumen of the stomach, consistent with duodenal perforation.  The CT showed as well bibasilar infiltrates, consistent with pneumonia.    Surgery were consulted, as well as GI.  Patient was started on Protonix drip, and got admitted for management. (29 Sep 2023 13:51)    PERTINENT PM/SXH:   Down syndrome    Osteoporosis    Mild anemia    Neuropathy      No significant past surgical history    S/P debridement      FAMILY HISTORY:    Unable to determine from patient was intubated    ITEMS NOT CHECKED ARE NOT PRESENT    SOCIAL HISTORY:   Significant other/partner[ ]  Children[ ]  Temple/Spirituality:  Substance hx:  [ ]   Tobacco hx:  [ ]   Alcohol hx: [ ]   Living Situation: [ ]Home  [ ]Long term care  [ ]Rehab [x ]Other - group home  Home Services: [ ] HHA [ ] Desi RN [ ] Hospice  Occupation:  Home Opioid hx:  [ ] Y [ ] N [x ] I-Stop Reference No:  Reference #: [id number] - no meds listed     ADVANCE DIRECTIVES:     [x ] Full Code [ ] DNR  MOLST  [ ]  Living Will  [ ]   DECISION MAKER(s):  [ ] Health Care Proxy(s)  [ ] Surrogate(s)  [ ] Guardian           Name(s): Phone Number(s):      BASELINE (I)ADL(s) (prior to admission):    [place]: [ ]Total  [ ] Moderate [ ]Dependent  Palliative Performance Status Version 2:         %    [email or web address]_[email or web address]    Allergies    No Known Allergies    Intolerances    MEDICATIONS  (STANDING):  chlorhexidine 0.12% Liquid 15 milliLiter(s) Oral Mucosa every 12 hours  chlorhexidine 2% Cloths 1 Application(s) Topical <User Schedule>  fentaNYL   Infusion. 0.501 MICROgram(s)/kG/Hr (2.77 mL/Hr) IV Continuous <Continuous>  gabapentin 600 milliGRAM(s) Oral daily  lactated ringers Bolus 500 milliLiter(s) IV Bolus once  lactated ringers. 1000 milliLiter(s) (50 mL/Hr) IV Continuous <Continuous>  levoFLOXacin IVPB 750 milliGRAM(s) IV Intermittent every 48 hours  meropenem  IVPB 1000 milliGRAM(s) IV Intermittent every 12 hours  norepinephrine Infusion 0.05 MICROgram(s)/kG/Min (5.18 mL/Hr) IV Continuous <Continuous>  pantoprazole  Injectable 40 milliGRAM(s) IV Push two times a day    MEDICATIONS  (PRN):  acetaminophen   Oral Liquid .. 650 milliGRAM(s) Enteral Tube every 6 hours PRN Temp greater or equal to 38C (100.4F)    PRESENT SYMPTOMS: [x ]Unable to obtain due to poor mentation   Source if other than patient:  [ ]Family   [ ]Team     Pain: [ ]yes [ ]no  QOL impact -   Location -                    Aggravating factors -  Quality -  Radiation -  Timing-  Severity (0-10 scale):  Minimal acceptable level (0-10 scale):     CPOT:  0  https://www.[facility].org/getattachment/hdh03m82-0i1k-6o0j-[email or web address]/Critical-Care-Pain-Observation-Tool-(CPOT)    PAIN AD Score:   http://geriatrictoolkit.[employer].edu/cog/painad.pdf (press ctrl +  left click to view)    Dyspnea:                           [ ]None[ ]Mild [ ]Moderate [ ]Severe     Respiratory Distress Observation Scale (RDOS):   A score of 0 to 2 signifies little or no respiratory distress, 3 signifies mild distress, scores 4 to 6 indicate moderate distress, and scores greater than 7 signify severe distress  [email or web address]/sites/default/[email or web address]-agphvyqdtaq-[email or web address].pdf    Anxiety:                             [ ]None[ ]Mild [ ]Moderate [ ]Severe   Fatigue:                             [ ]None[ ]Mild [ ]Moderate [ ]Severe   Nausea:                             [ ]None[ ]Mild [ ]Moderate [ ]Severe   Loss of appetite:              [ ]None[ ]Mild [ ]Moderate [ ]Severe   Constipation:                    [ ]None[ ]Mild [ ]Moderate [ ]Severe    Other Symptoms:  [ ]All other review of systems negative     Palliative Performance Status Version 2:         10%    [email or web address]_performance_[email or web address]    PHYSICAL EXAM:  Vital Signs Last 24 Hrs  T(C): 35.9 (02 Oct 2023 08:00), Max: 37.6 (02 Oct 2023 04:00)  T(F): 96.6 (02 Oct 2023 08:00), Max: 99.6 (02 Oct 2023 04:00)  HR: 55 (02 Oct 2023 14:40) (45 - 131)  BP: 99/58 (02 Oct 2023 13:30) (84/51 - 169/75)  BP(mean): 75 (02 Oct 2023 13:30) (64 - 108)  RR: 16 (02 Oct 2023 13:30) (13 - 31)  SpO2: 100% (02 Oct 2023 14:40) (70% - 100%)    Parameters below as of 02 Oct 2023 12:00  Patient On (Oxygen Delivery Method): ventilator    O2 Concentration (%): 40 I&O's Summary    01 Oct 2023 07:01  -  02 Oct 2023 07:00  --------------------------------------------------------  IN: 1811.6 mL / OUT: 1915 mL / NET: -103.4 mL    02 Oct 2023 07:01  -  02 Oct 2023 15:16  --------------------------------------------------------  IN: 1393.5 mL / OUT: 300 mL / NET: 1093.5 mL        GENERAL:  [ x] No acute distress [ ]Lethargic  [ ]Unarousable  [ ]Verbal  [x ]Non-Verbal [ ]Cachexia    BEHAVIORAL/PSYCH:  [ ]Alert and Oriented x  [ ] Anxiety [ ] Delirium [ ] Agitation [ x] Calm   EYES: [ ] No scleral icterus [ ] Scleral icterus [x ] Closed  ENMT:  [ ]Dry mouth  [ x]No external oral lesions [ ] No external ear or nose lesions  CARDIOVASCULAR:  [ ]Regular [ ]Irregular [x ]Tachy [ ]Not Tachy  [ ]Raheem [ ] Edema [ ] No edema  PULMONARY:  [ ]Tachypnea  [ ]Audible excessive secretions [x] No labored breathing [ ] labored breathing  GASTROINTESTINAL: [ ]Soft  [ ]Distended  [ ]Not distended [ ]Non tender [ ]Tender  MUSCULOSKELETAL: [ ]No clubbing [ ] clubbing  [x ] No cyanosis [ ] cyanosis  NEUROLOGIC: [ ]No focal deficits  [ ]Follows commands  [x ]Does not follow commands  [ ]Cognitive impairment  [ ]Dysphagia  [ ]Dysarthria  [ ]Paresis   SKIN: [ ] Jaundiced [ ] Non-jaundiced [ ]Rash [x]No Rash [ ] Warm [ ] Dry  MISC/LINES: [ x] ET tube [ ] Trach [ ]NGT/OGT [ ]PEG [ ]Madsen    LABS: reviewed by me                        9.5    10.92 )-----------( 193      ( 02 Oct 2023 04:38 )             29.7   10-02    136  |  102  |  8<L>  ----------------------------<  73  3.8   |  25  |  0.6<L>    Ca    7.8<L>      02 Oct 2023 04:38  Mg     2.1     10-02    TPro  5.0<L>  /  Alb  2.6<L>  /  TBili  0.4  /  DBili  x   /  AST  24  /  ALT  23  /  AlkPhos  107  10-02      Urinalysis Basic - ( 02 Oct 2023 04:38 )    Color: x / Appearance: x / SG: x / pH: x  Gluc: 73 mg/dL / Ketone: x  / Bili: x / Urobili: x   Blood: x / Protein: x / Nitrite: x   Leuk Esterase: x / RBC: x / WBC x   Sq Epi: x / Non Sq Epi: x / Bacteria: x      RADIOLOGY & ADDITIONAL STUDIES: reviewed by me    < from: CT Abdomen and Pelvis w/ Oral Cont and w/ IV Cont (10.01.23 @ 20:39) >  IMPRESSION:    1.  Extensive patchy bibasilar opacities/consolidations redemonstrated.   Bilateral pleural effusions partially imaged.  2.  Previous seen foci of retroperitoneal gas no longer identified.  3.  New cecal wall thickening, compatible with cecitis.  4.  Small ascites layering in the pelvis.  5.  Thickening/infiltrative changes of the soft tissues of the right   lateral thigh. Correlate with exam.    < end of copied text >      EKG: reviewed by me    < from: 12 Lead ECG (09.29.23 @ 10:17) >  Ventricular Rate 103 BPM    Atrial Rate 103 BPM    P-R Interval 128 ms    QRS Duration 66 ms    Q-T Interval 322 ms    QTC Calculation(Bazett) 421 ms    P Axis 69 degrees    R Axis 71 degrees    T Axis 73 degrees    Diagnosis Line Sinus tachycardia  ST elevation, consider early repolarization  Borderline ECG    < end of copied text >      PROTEIN CALORIE MALNUTRITION PRESENT: [ ]mild [ ]moderate [ ]severe [ ]underweight [ ]morbid obesity  https://www.[employer].org/vault/2440/web/files/ONC/Table_Clinical%20Characteristics%20to%20Document%20Malnutrition-White%20JV%20et%20al%481793.pdf    Height (cm): 142.2 (08-12-23 @ 00:41), 142.2 (08-04-23 @ 09:15)  Weight (kg): 52.1 (08-15-23 @ 00:00), 74.4 (08-11-23 @ 21:15)  BMI (kg/m2): 25.8 (08-15-23 @ 00:00), 36.8 (08-12-23 @ 00:41), 36.8 (08-11-23 @ 21:15)  [ ]PPSV2 < or = to 30% [ ]significant weight loss  [ ]poor nutritional intake  [ ]anasarca      [ ]Artificial Nutrition      Palliative Care Spiritual/Emotional Screening Tool Question  Severity (0-4):                    OR                    [ x] Unable to determine. Will assess at later time if appropriate.  Score of 2 or greater indicates recommendation of Chaplaincy and/or SW referral  Chaplaincy Referral: [ ] Yes [ ] Refused [ ] Following     Caregiver [place]:  [ ] Yes [ ] No    OR    [x ] Unable to determine. Will assess at later time if appropriate.  Social Work Referral [ ]  Patient and Family Centered Care Referral [ ]    Anticipatory Grief Present: [ ] Yes [ ] No    OR     [ x] Unable to determine. Will assess at later time if appropriate.  Social Work Referral [ ]  Patient and Family Centered Care Referral [ ]    Patient discussed with primary medical team MD  Palliative care education provided to patient and/or family

## 2023-10-02 NOTE — CONSULT NOTE ADULT - CONSULT REQUESTED DATE/TIME
29-Sep-2023 14:29
29-Sep-2023 09:30
29-Sep-2023 11:29
02-Oct-2023 15:15
02-Oct-2023 17:29
30-Sep-2023 10:59

## 2023-10-02 NOTE — PROGRESS NOTE ADULT - SUBJECTIVE AND OBJECTIVE BOX
Over Night Events:  events noted, remain critically ill, still intubated, ventilated, Lr 50, fentanyl, levophed 0.05    PHYSICAL EXAM    ICU Vital Signs Last 24 Hrs  T(C): 37.6 (02 Oct 2023 04:00), Max: 37.6 (01 Oct 2023 08:00)  T(F): 99.6 (02 Oct 2023 04:00), Max: 99.6 (01 Oct 2023 08:00)  HR: 58 (02 Oct 2023 07:28) (57 - 117)  BP: 101/58 (02 Oct 2023 07:00) (84/51 - 147/96)  BP(mean): 74 (02 Oct 2023 07:00) (61 - 111)  RR: 16 (02 Oct 2023 07:00) (13 - 26)  SpO2: 95% (02 Oct 2023 07:28) (70% - 100%)    O2 Parameters below as of 02 Oct 2023 00:00  Patient On (Oxygen Delivery Method): ventilator    O2 Concentration (%): 40        General: ill looking  ETT  Lungs: Bilateral rhonchi  Cardiovascular: HARPREET 2.6  Abdomen: Soft, Positive BS  Extremities: No clubbing   Neurological: Non focal   Contracted  blisters hand      10-01-23 @ 07:01  -  10-02-23 @ 07:00  --------------------------------------------------------  IN:    FentaNYL: 206.3 mL    IV PiggyBack: 200 mL    Lactated Ringers: 1220 mL    Norepinephrine: 185.3 mL  Total IN: 1811.6 mL    OUT:    Indwelling Catheter - Urethral (mL): 40 mL    Intermittent Catheterization - Urethral (mL): 700 mL    Voided (mL): 1175 mL  Total OUT: 1915 mL    Total NET: -103.4 mL          LABS:                          9.5    10.92 )-----------( 193      ( 02 Oct 2023 04:38 )             29.7                                               10-02    136  |  102  |  8<L>  ----------------------------<  73  3.8   |  25  |  0.6<L>    Ca    7.8<L>      02 Oct 2023 04:38  Mg     2.1     10-02    TPro  5.0<L>  /  Alb  2.6<L>  /  TBili  0.4  /  DBili  x   /  AST  24  /  ALT  23  /  AlkPhos  107  10-02                                             Urinalysis Basic - ( 02 Oct 2023 04:38 )    Color: x / Appearance: x / SG: x / pH: x  Gluc: 73 mg/dL / Ketone: x  / Bili: x / Urobili: x   Blood: x / Protein: x / Nitrite: x   Leuk Esterase: x / RBC: x / WBC x   Sq Epi: x / Non Sq Epi: x / Bacteria: x        CARDIAC MARKERS ( 01 Oct 2023 04:43 )  x     / 0.11 ng/mL / x     / x     / x                                                LIVER FUNCTIONS - ( 02 Oct 2023 04:38 )  Alb: 2.6 g/dL / Pro: 5.0 g/dL / ALK PHOS: 107 U/L / ALT: 23 U/L / AST: 24 U/L / GGT: x                                                  Culture - Urine (collected 29 Sep 2023 10:10)  Source: Clean Catch Clean Catch (Midstream)  Final Report (01 Oct 2023 01:13):    <10,000 CFU/mL Normal Urogenital Mili    Culture - Blood (collected 29 Sep 2023 09:47)  Source: .Blood Blood-Peripheral  Preliminary Report (01 Oct 2023 17:01):    No growth at 48 Hours    Culture - Blood (collected 29 Sep 2023 09:47)  Source: .Blood Blood-Peripheral  Gram Stain (01 Oct 2023 09:15):    Growth in aerobic bottle: Gram Positive Rods and Gram positive cocci in    pairs    Growth in anaerobic bottle: Gram positive cocci in pairs  Preliminary Report (01 Oct 2023 17:50):    Growth in aerobic bottle: Corynebacterium amycolatum "Susceptibilities    not performed"    Growth in aerobic bottle: Gram positive cocci in pairs    Growth in anaerobic bottle: Gram positive cocci in pairs    Direct identification is available within approximately 3-5    hours either by Blood Panel Multiplexed PCR or Direct    MALDI-TOF. Details: [email or web address]  Organism: Blood Culture PCR (01 Oct 2023 01:07)  Organism: Blood Culture PCR (01 Oct 2023 01:07)                                                   Mode: AC/ CMV (Assist Control/ Continuous Mandatory Ventilation)  RR (machine): 16  TV (machine): 350  FiO2: 40  PEEP: 8  ITime: 0.9  MAP: 12  PIP: 24                                      ABG - ( 02 Oct 2023 03:02 )  pH, Arterial: 7.54  pH, Blood: x     /  pCO2: 34    /  pO2: 106   / HCO3: 29    / Base Excess: 6.6   /  SaO2: 99.0                MEDICATIONS  (STANDING):  chlorhexidine 0.12% Liquid 15 milliLiter(s) Oral Mucosa every 12 hours  chlorhexidine 2% Cloths 1 Application(s) Topical <User Schedule>  fentaNYL   Infusion. 0.5 MICROgram(s)/kG/Hr (2.77 mL/Hr) IV Continuous <Continuous>  gabapentin 600 milliGRAM(s) Oral daily  lactated ringers. 1000 milliLiter(s) (50 mL/Hr) IV Continuous <Continuous>  levoFLOXacin IVPB 750 milliGRAM(s) IV Intermittent every 48 hours  meropenem  IVPB 1000 milliGRAM(s) IV Intermittent every 12 hours  norepinephrine Infusion 0.05 MICROgram(s)/kG/Min (5.18 mL/Hr) IV Continuous <Continuous>  pantoprazole  Injectable 40 milliGRAM(s) IV Push two times a day    MEDICATIONS  (PRN):  acetaminophen   Oral Liquid .. 650 milliGRAM(s) Enteral Tube every 6 hours PRN Temp greater or equal to 38C (100.4F)        CXR reviewed

## 2023-10-02 NOTE — PROGRESS NOTE ADULT - SUBJECTIVE AND OBJECTIVE BOX
Gastroenterology progress note:     Patient is a 58y old  Female who presents with a chief complaint of Upper GI bleed (02 Oct 2023 07:44)       Admitted on: 09-29-23    We are following the patient for:  duodenal perforation       Interval History: patient still NPO, surgery recommending CTAP Oral contrast    No acute events overnight.   - Diet - NPO      PAST MEDICAL & SURGICAL HISTORY:  Down syndrome      Osteoporosis      Mild anemia      Neuropathy      S/P debridement  of R hip on 3/2/21          MEDICATIONS  (STANDING):  chlorhexidine 0.12% Liquid 15 milliLiter(s) Oral Mucosa every 12 hours  chlorhexidine 2% Cloths 1 Application(s) Topical <User Schedule>  fentaNYL   Infusion. 0.501 MICROgram(s)/kG/Hr (2.77 mL/Hr) IV Continuous <Continuous>  gabapentin 600 milliGRAM(s) Oral daily  lactated ringers. 1000 milliLiter(s) (50 mL/Hr) IV Continuous <Continuous>  levoFLOXacin IVPB 750 milliGRAM(s) IV Intermittent every 48 hours  meropenem  IVPB 1000 milliGRAM(s) IV Intermittent every 12 hours  norepinephrine Infusion 0.05 MICROgram(s)/kG/Min (5.18 mL/Hr) IV Continuous <Continuous>  pantoprazole  Injectable 40 milliGRAM(s) IV Push two times a day    MEDICATIONS  (PRN):  acetaminophen   Oral Liquid .. 650 milliGRAM(s) Enteral Tube every 6 hours PRN Temp greater or equal to 38C (100.4F)      Allergies  No Known Allergies      Review of Systems:   Cardiovascular:  No Chest Pain, No Palpitations  Respiratory:  No Cough, No Dyspnea  Gastrointestinal:  As described in HPI  Skin:  No Skin Lesions, No Jaundice  Neuro:  No Syncope, No Dizziness    Physical Examination:  T(C): 37.6 (10-02-23 @ 04:00), Max: 37.6 (10-02-23 @ 04:00)  HR: 58 (10-02-23 @ 07:49) (57 - 117)  BP: 101/58 (10-02-23 @ 07:00) (84/51 - 147/96)  RR: 16 (10-02-23 @ 07:00) (13 - 26)  SpO2: 97% (10-02-23 @ 07:49) (70% - 100%)      10-01-23 @ 07:01  -  10-02-23 @ 07:00  --------------------------------------------------------  IN: 1811.6 mL / OUT: 1915 mL / NET: -103.4 mL        GENERAL:, no acute distress, vented and sedated  HEAD:  Atraumatic, Normocephalic  EYES: conjunctiva and sclera clear  NECK: Supple, no JVD or thyromegaly  CHEST/LUNG: Clear to auscultation bilaterally; No wheeze, rhonchi, or rales  HEART: Regular rate and rhythm; normal S1, S2, No murmurs.  ABDOMEN: Soft, nontender, nondistended; Bowel sounds present  NEUROLOGY: No asterixis or tremor.   SKIN: no jaundice     Data:                        9.5    10.92 )-----------( 193      ( 02 Oct 2023 04:38 )             29.7     Hgb trend:  9.5  10-02-23 @ 04:38  10.1  10-02-23 @ 00:10  10.2  10-01-23 @ 04:43  12.0  09-30-23 @ 04:40  11.8  09-29-23 @ 23:30  12.5  09-29-23 @ 20:02  14.7  09-29-23 @ 16:00        10-02    136  |  102  |  8<L>  ----------------------------<  73  3.8   |  25  |  0.6<L>    Ca    7.8<L>      02 Oct 2023 04:38  Mg     2.1     10-02    TPro  5.0<L>  /  Alb  2.6<L>  /  TBili  0.4  /  DBili  x   /  AST  24  /  ALT  23  /  AlkPhos  107  10-02    Liver panel trend:  TBili 0.4   /   AST 24   /   ALT 23   /   AlkP 107   /   Tptn 5.0   /   Alb 2.6    /   DBili --      10-02  TBili 0.6   /   AST 31   /   ALT 29   /   AlkP 73   /   Tptn 5.0   /   Alb 2.2    /   DBili --      10-01  TBili 0.8   /   AST 78   /   ALT 49   /   AlkP 80   /   Tptn 5.9   /   Alb 2.8    /   DBili --      09-30  TBili 0.4   /   AST 67   /   ALT 17   /   AlkP 78   /   Tptn 6.6   /   Alb 3.0    /   DBili --      09-29          Culture - Urine (collected 29 Sep 2023 10:10)  Source: Clean Catch Clean Catch (Midstream)  Final Report (01 Oct 2023 01:13):    <10,000 CFU/mL Normal Urogenital Mili    Culture - Blood (collected 29 Sep 2023 09:47)  Source: .Blood Blood-Peripheral  Preliminary Report (01 Oct 2023 17:01):    No growth at 48 Hours    Culture - Blood (collected 29 Sep 2023 09:47)  Source: .Blood Blood-Peripheral  Gram Stain (01 Oct 2023 09:15):    Growth in aerobic bottle: Gram Positive Rods and Gram positive cocci in    pairs    Growth in anaerobic bottle: Gram positive cocci in pairs  Preliminary Report (01 Oct 2023 17:50):    Growth in aerobic bottle: Corynebacterium amycolatum "Susceptibilities    not performed"    Growth in aerobic bottle: Gram positive cocci in pairs    Growth in anaerobic bottle: Gram positive cocci in pairs    Direct identification is available within approximately 3-5    hours either by Blood Panel Multiplexed PCR or Direct    MALDI-TOF. Details: [email or web address]  Organism: Blood Culture PCR (01 Oct 2023 01:07)  Organism: Blood Culture PCR (01 Oct 2023 01:07)

## 2023-10-02 NOTE — CONSULT NOTE ADULT - PROBLEM SELECTOR RECOMMENDATION 9
Contained D3 perforation.  Intubated and sedated  -continue PPI infusion  -continue meropenem  -monitor OG outputs  -monitor CBC, transfuse as needed  -vent management per ICU team

## 2023-10-02 NOTE — PROGRESS NOTE ADULT - SUBJECTIVE AND OBJECTIVE BOX
Patient is a 58y old  Female who presents with a chief complaint of Upper GI bleed (02 Oct 2023 08:33)    Today is day 3 of hospitalization.  Over Night Events: remain critically ill, still intubated, ventilated, Lr 50, fentanyl, levophed 0.05    HPI:  58 year old female with a PMHx of Down syndrome, nonverbal at baseline, hypothyroidism, cerebral palsy, congenital pulmonary stenosis presents to the ED for hemoptysis.    Patient was recently admitted to the hospital for management of pneumonia and resultant hypoxic respiratory failure.  Patient was noted of having hematemesis when she was admitted previously, a CT of the abdomen showed esophagitis for which GI recommended OP upper endoscopy.    On the day of presentation, the patient was less responsive with noted hemoptysis.   When asking the group home personnel, patient was not exhibiting any signs of respiratory distress, abdominal pain, cough, shortness of breath, fevers/ sweating, or any changes in bowel habits.    In the ED, the patient was HD stable, afebrile.  Labs workup revealed Hb= 9.9, WBC= 11k, plt= 432, creatinine= 1.6.  pH= 7.19/ CO2= 55/ O2= 188/ 21.    Patient received 2 PRBCs, got intubated for airway protection where blood was noted in the trachea.  A CT of the abdomen showed foci of air in the lumen of the stomach, consistent with duodenal perforation.  The CT showed as well bibasilar infiltrates, consistent with pneumonia.    Surgery were consulted, as well as GI.  Patient was started on Protonix drip, and got admitted for management. (29 Sep 2023 13:51)      PAST MEDICAL & SURGICAL HISTORY:  Down syndrome  Osteoporosis  Mild anemia  Neuropathy  S/P debridement  of R hip on 3/2/21      SOCIAL HX: none    FAMILY HISTORY:  .  No cardiovascular or pulmonary family history     REVIEW OF SYSTEMS:    All ROS are negative exept per HPI       Allergies    No Known Allergies    Intolerances          PHYSICAL EXAM  Vital Signs Last 24 Hrs  T(C): 35.9 (02 Oct 2023 08:00), Max: 37.6 (02 Oct 2023 04:00)  T(F): 96.6 (02 Oct 2023 08:00), Max: 99.6 (02 Oct 2023 04:00)  HR: 95 (02 Oct 2023 08:00) (57 - 117)  BP: 102/60 (02 Oct 2023 08:00) (84/51 - 147/96)  BP(mean): 76 (02 Oct 2023 08:00) (61 - 111)  RR: 16 (02 Oct 2023 08:00) (13 - 26)  SpO2: 96% (02 Oct 2023 08:00) (70% - 98%)    Parameters below as of 02 Oct 2023 08:00  Patient On (Oxygen Delivery Method): ventilator  O2 Flow (L/min): 40      CONSTITUTIONAL:  Ill appearing    ENT:   Airway patent,   intubated    EYES:   Clear bilaterally,     CARDIAC:   Normal rate,   regular rhythm.    no edema      RESPIRATORY:   Bilateral rhonchi    GASTROINTESTINAL:  Abdomen soft, non-tender,   No guarding,   Positive BS    MUSCULOSKELETAL:   Range of motion is not limited,  No clubbing, cyanosis    NEUROLOGICAL:   contracted      SKIN:   Skin normal color for race,   blisters on her hands      LABS:                          9.5    10.92 )-----------( 193      ( 02 Oct 2023 04:38 )             29.7                                               10-02    136  |  102  |  8<L>  ----------------------------<  73  3.8   |  25  |  0.6<L>    Ca    7.8<L>      02 Oct 2023 04:38  Mg     2.1     10-02    TPro  5.0<L>  /  Alb  2.6<L>  /  TBili  0.4  /  DBili  x   /  AST  24  /  ALT  23  /  AlkPhos  107  10-02                                             Urinalysis Basic - ( 02 Oct 2023 04:38 )    Color: x / Appearance: x / SG: x / pH: x  Gluc: 73 mg/dL / Ketone: x  / Bili: x / Urobili: x   Blood: x / Protein: x / Nitrite: x   Leuk Esterase: x / RBC: x / WBC x   Sq Epi: x / Non Sq Epi: x / Bacteria: x        CARDIAC MARKERS ( 01 Oct 2023 04:43 )  x     / 0.11 ng/mL / x     / x     / x                                                LIVER FUNCTIONS - ( 02 Oct 2023 04:38 )  Alb: 2.6 g/dL / Pro: 5.0 g/dL / ALK PHOS: 107 U/L / ALT: 23 U/L / AST: 24 U/L / GGT: x                                                  Culture - Urine (collected 29 Sep 2023 10:10)  Source: Clean Catch Clean Catch (Midstream)  Final Report (01 Oct 2023 01:13):    <10,000 CFU/mL Normal Urogenital Mili                                                ABG - ( 02 Oct 2023 03:02 )  pH, Arterial: 7.54  pH, Blood: x     /  pCO2: 34    /  pO2: 106   / HCO3: 29    / Base Excess: 6.6   /  SaO2: 99.0                MEDICATIONS  (STANDING):  chlorhexidine 0.12% Liquid 15 milliLiter(s) Oral Mucosa every 12 hours  chlorhexidine 2% Cloths 1 Application(s) Topical <User Schedule>  fentaNYL   Infusion. 0.501 MICROgram(s)/kG/Hr (2.77 mL/Hr) IV Continuous <Continuous>  gabapentin 600 milliGRAM(s) Oral daily  lactated ringers. 1000 milliLiter(s) (50 mL/Hr) IV Continuous <Continuous>  levoFLOXacin IVPB 750 milliGRAM(s) IV Intermittent every 48 hours  meropenem  IVPB 1000 milliGRAM(s) IV Intermittent every 12 hours  norepinephrine Infusion 0.05 MICROgram(s)/kG/Min (5.18 mL/Hr) IV Continuous <Continuous>  pantoprazole  Injectable 40 milliGRAM(s) IV Push two times a day  silver sulfADIAZINE 1% Cream 1 Application(s) Topical daily    MEDICATIONS  (PRN):  acetaminophen   Oral Liquid .. 650 milliGRAM(s) Enteral Tube every 6 hours PRN Temp greater or equal to 38C (100.4F)    Radiology    ACC: [id number] EXAM:  CT CHEST   ORDERED BY: CONCHA NOLASCO     PROCEDURE DATE:  10/01/2023      INTERPRETATION:  Reason for Exam: Aspiration.    Technique: CT of the chest was performed from the thoracic inlet to the   level of the adrenal glandswithout contrast injection. Coronal and   sagittal images have been submitted.    Comparison CT: August 4, 2023.    Findings:    Lungs, Pleura, and Airways: Endotracheal tube in place. New small   bilateral pleural effusions and increased patchy bilateral consolidated   opacities, predominantly involving the bilateral lower lobes and to a   lesser extent involving bilateral upper lobes and right middle lobe,   suspicious for pneumonia, possibly related to prior aspiration event.   Central tracheobronchial tree is grossly patent. No pneumothorax.    Mediastinum/Lymph Nodes:Enteric tube is noted within the esophagus, with   abundant contrast material noted within the mid to lower esophagus. No   enlarged thoracic lymph nodes.    Heart/Great Vessels: Heart size is within normal limits. Thoracic aorta   and pulmonary arteries are normal caliber. Trace pericardial fluid.    Bones and soft tissues: No acute osseous abnormality.    IMPRESSION:    1. Since August 4, 2023, increased patchy bilateral consolidated   opacities, predominantly involving bilateral lower lobes and to a lesser   extent involving bilateral upper and right middle lobes, suspicious for   pneumonia, possibly related to prior aspiration event; abundant contrast   material is noted within the mid to lower esophagus, likely reflecting   reflux subsequent to prior contrast administration.  2. New small bilateral pleural effusions.      Please see separately dictated report of concurrently performed   abdominopelvic CT forreport of intra-abdominal findings.    --- End of Report ---      STEVIE IRAHETA MD; Attending Radiologist  This document has been electronically signed. Oct  2 2023  8:50AM      ACC: [id number] EXAM:  DUPLEX EXT VEINS UPPER LT   ORDERED BY: GREGORY GUTIERRES     PROCEDURE DATE:  10/01/2023          INTERPRETATION:  CLINICAL INFORMATION: 58 years old female with left arm   swelling    TECHNIQUE: Duplex sonography of the LEFT UPPER extremity veins with color   and spectral Doppler, with and without compression.    FINDINGS:    The left internal jugular, subclavian, axillary and brachial veins are   patent and compressible where applicable.  Basilic vein appears thrombosed.  The cephalic vein (superficial vein) is patent and without thrombus.      IMPRESSION:  No evidence of left upper extremity deep venous thrombosis.    Left basilic vein appears thrombosed.    --- End of Report ---      LIZABETH CONNELL MD; Attending Vascular Surgeon  This document has been electronically signed. Oct  1 2023  8:03PM

## 2023-10-02 NOTE — PROGRESS NOTE ADULT - ASSESSMENT
58 female with PMH of cerebral palsy, non verbal, seizure disorder, down syndrome, impulsive control disorder,, multiple pressure ulcers, orthostatic hypotension (on midodrine), constipation, congenital pulmonary stenosis brought by EMS as she was found hypoxic , unresponsive , dark emesis on the field s/p intubation. In ER, patient was hypotensive, was started on pressors , s/p 2u prbc. GI consulted for dark emesis, found to have duodenal perforation    #Dark emesis with hypotension on pressors - duodenal perforation  #AHRF with PNA s/p intubation  - Hb on admission: 9.9 s/p 2u prbc in ER (last Hb in 8/23: 9.3)>>12 (9/30)> 9.5 (10/2)  - WBC: 11k, Lactate: 6.7>5.7   - INR: 1.18, BUN/Cr: 22/1.6  - not on AC  - on PPI drip  - NG tube to suction showing dark fluid with blood tinge, very minimal outpt today  - AN: brown stool  - initially on 1.2 levo now on 0.3 today  - CTAP with duodenal perforation, proctocolitis  - elevated tropnonins  - abdomen soft  - 10/2 still on pressors, Iv abx    PLAN:  - NPO to suction per surgery  - CTAP oral contrast , feeding per surgery  - Maintain active Type and screen  - no endoscopic intervention at this time  - c/w IV antibiotics  - c/w protonix IV  - Please correct electrolytes (Target Na 135-145, Mg 1.7-2.2, K 3.5-5)  - Please correct INR to <1.5  - Please target Hb  >8  - Please avoid any NSAIDs  - If any unstable bleed, please call GI stat  - rest of the care per MICU, surgery team 58 female with PMH of cerebral palsy, non verbal, seizure disorder, down syndrome, impulsive control disorder,, multiple pressure ulcers, orthostatic hypotension (on midodrine), constipation, congenital pulmonary stenosis brought by EMS as she was found hypoxic , unresponsive , dark emesis on the field s/p intubation. In ER, patient was hypotensive, was started on pressors , s/p 2u prbc. GI consulted for dark emesis, found to have duodenal perforation    #Dark emesis with hypotension on pressors - duodenal perforation  #AHRF with PNA s/p intubation  - Hb on admission: 9.9 s/p 2u prbc in ER (last Hb in 8/23: 9.3)>>12 (9/30)> 9.5 (10/2)  - WBC: 11k, Lactate: 6.7>5.7   - INR: 1.18, BUN/Cr: 22/1.6  - not on AC  - on PPI drip  - NG tube to suction showing dark fluid with blood tinge, very minimal outpt today  - AN: brown stool  - initially on 1.2 levo now on 0.3 today  - CTAP with duodenal perforation, proctocolitis  - elevated tropnonins  - abdomen soft  - 10/2 still on pressors, Iv abx  - CTAP with oral contrast: no free air extra luminal, cecitis    PLAN:  - NPO to suction per surgery  - Maintain active Type and screen  - no endoscopic intervention at this time  - recommend bowel regimen  - c/w IV antibiotics  - c/w protonix IV  - Please correct electrolytes (Target Na 135-145, Mg 1.7-2.2, K 3.5-5)  - Please correct INR to <1.5  - Please target Hb  >8  - Please avoid any NSAIDs  - If any unstable bleed, please call GI stat  - rest of the care per MICU, surgery team 58 female with PMH of cerebral palsy, non verbal, seizure disorder, down syndrome, impulsive control disorder,, multiple pressure ulcers, orthostatic hypotension (on midodrine), constipation, congenital pulmonary stenosis brought by EMS as she was found hypoxic , unresponsive , dark emesis on the field s/p intubation. In ER, patient was hypotensive, was started on pressors , s/p 2u prbc. GI consulted for dark emesis, found to have duodenal perforation    #Dark emesis with hypotension on pressors - duodenal perforation  #AHRF with PNA s/p intubation  - Hb on admission: 9.9 s/p 2u prbc in ER (last Hb in 8/23: 9.3)>>12 (9/30)> 9.5 (10/2)  - WBC: 11k, Lactate: 6.7>5.7   - INR: 1.18, BUN/Cr: 22/1.6  - not on AC  - on PPI drip  - NG tube to suction showing dark fluid with blood tinge, very minimal outpt today  - AN: brown stool  - initially on 1.2 levo now on 0.3 today  - CTAP with duodenal perforation, proctocolitis  - elevated troponin  - abdomen soft  - 10/2 still on pressors, Iv abx  - CTAP with oral contrast: no free air extra luminal, cecitis  - no BM so far, clinically patient does not have any picture of cecitis, physical exam benign    PLAN:  - NPO to suction per surgery  - Maintain active Type and screen  - no endoscopic intervention at this time  - recommend bowel regimen  - c/w IV antibiotics  - c/w protonix PO BID 40mg atleast for 8 weeks  - Please correct electrolytes (Target Na 135-145, Mg 1.7-2.2, K 3.5-5)  - Please correct INR to <1.5  - Please target Hb  >8  - Please avoid any NSAIDs  - If any unstable bleed, please call GI stat  - rest of the care per MICU, surgery team  - recommend outpatient follow up [facility]

## 2023-10-02 NOTE — CONSULT NOTE ADULT - SUBJECTIVE AND OBJECTIVE BOX
HPI:  58 year old female with a PMHx of Down syndrome, nonverbal at baseline, hypothyroidism, cerebral palsy, congenital pulmonary stenosis presents to the ED for hemoptysis.    Patient was recently admitted to the hospital for management of pneumonia and resultant hypoxic respiratory failure.  Patient was noted of having hematemesis when she was admitted previously, a CT of the abdomen showed esophagitis for which GI recommended OP upper endoscopy.    On the day of presentation, the patient was less responsive with noted hemoptysis.   When asking the group home personnel, patient was not exhibiting any signs of respiratory distress, abdominal pain, cough, shortness of breath, fevers/ sweating, or any changes in bowel habits.    In the ED, the patient was HD stable, afebrile.  Labs workup revealed Hb= 9.9, WBC= 11k, plt= 432, creatinine= 1.6.  pH= 7.19/ CO2= 55/ O2= 188/ 21.    Patient received 2 PRBCs, got intubated for airway protection where blood was noted in the trachea.  A CT of the abdomen showed foci of air in the lumen of the stomach, consistent with duodenal perforation.  The CT showed as well bibasilar infiltrates, consistent with pneumonia.    Surgery were consulted, as well as GI.  Patient was started on Protonix drip, and got admitted for management. (29 Sep 2023 13:51)    BURN consulted for IV extravasion of Left hand, now swelling, warm, with blisters    Allergies    No Known Allergies    Intolerances      PAST MEDICAL & SURGICAL HISTORY:  Down syndrome      Osteoporosis      Mild anemia      Neuropathy      S/P debridement  of R hip on 3/2/21                          9.5    10.92 )-----------( 193      ( 02 Oct 2023 04:38 )             29.7     ICU Vital Signs Last 24 Hrs  T(C): 35.9 (02 Oct 2023 08:00), Max: 37.6 (02 Oct 2023 04:00)  T(F): 96.6 (02 Oct 2023 08:00), Max: 99.6 (02 Oct 2023 04:00)  HR: 55 (02 Oct 2023 14:40) (45 - 131)  BP: 99/58 (02 Oct 2023 13:30) (84/51 - 169/75)  BP(mean): 75 (02 Oct 2023 13:30) (64 - 108)  RR: 16 (02 Oct 2023 13:30) (13 - 31)    SpO2: 100% (02 Oct 2023 14:40) (91% - 100%)    O2 Parameters below as of 02 Oct 2023 12:00  Patient On (Oxygen Delivery Method): ventilator    O2 Concentration (%): 40        PHYSICAL EXAM:  GENERAL: NAD,  CHEST/LUNG: Intubated on vent   HEART: In no acute cardiopulmomary distress   EXTREMITIES:  2+ Peripheral Pulses, No clubbing, cyanosis, or edema  SKIN: Left hand swelling with few blisters noted to dorsum and 4th digit which were excised revealing pink and moist wound base. +serous drainage. No purulent drainage, malodor, or active bleeding evident. palpable radial pulse.     Dressing applied. Patient tolerated well.          HPI:  58 year old female with a PMHx of Down syndrome, nonverbal at baseline, hypothyroidism, cerebral palsy, congenital pulmonary stenosis presents to the ED for hemoptysis.    Patient was recently admitted to the hospital for management of pneumonia and resultant hypoxic respiratory failure.  Patient was noted of having hematemesis when she was admitted previously, a CT of the abdomen showed esophagitis for which GI recommended OP upper endoscopy.    On the day of presentation, the patient was less responsive with noted hemoptysis.   When asking the group home personnel, patient was not exhibiting any signs of respiratory distress, abdominal pain, cough, shortness of breath, fevers/ sweating, or any changes in bowel habits.    In the ED, the patient was HD stable, afebrile.  Labs workup revealed Hb= 9.9, WBC= 11k, plt= 432, creatinine= 1.6.  pH= 7.19/ CO2= 55/ O2= 188/ 21.    Patient received 2 PRBCs, got intubated for airway protection where blood was noted in the trachea.  A CT of the abdomen showed foci of air in the lumen of the stomach, consistent with duodenal perforation.  The CT showed as well bibasilar infiltrates, consistent with pneumonia.    Surgery were consulted, as well as GI.  Patient was started on Protonix drip, and got admitted for management. (29 Sep 2023 13:51)    BURN consulted for IV extravasion of Left hand, now swelling, warm, with blisters    Allergies    No Known Allergies    Intolerances      PAST MEDICAL & SURGICAL HISTORY:  Down syndrome      Osteoporosis      Mild anemia      Neuropathy      S/P debridement  of R hip on 3/2/21                          9.5    10.92 )-----------( 193      ( 02 Oct 2023 04:38 )             29.7     ICU Vital Signs Last 24 Hrs  T(C): 35.9 (02 Oct 2023 08:00), Max: 37.6 (02 Oct 2023 04:00)  T(F): 96.6 (02 Oct 2023 08:00), Max: 99.6 (02 Oct 2023 04:00)  HR: 55 (02 Oct 2023 14:40) (45 - 131)  BP: 99/58 (02 Oct 2023 13:30) (84/51 - 169/75)  BP(mean): 75 (02 Oct 2023 13:30) (64 - 108)  RR: 16 (02 Oct 2023 13:30) (13 - 31)    SpO2: 100% (02 Oct 2023 14:40) (91% - 100%)    O2 Parameters below as of 02 Oct 2023 12:00  Patient On (Oxygen Delivery Method): ventilator    O2 Concentration (%): 40        PHYSICAL EXAM:  GENERAL: NAD,  CHEST/LUNG: Intubated on vent   HEART: In no acute cardiopulmomary distress   SKIN: Left hand swelling with few blisters noted to dorsum and 4th digit which were excised revealing pink and moist wound base. +serous drainage. No purulent drainage, malodor, or active bleeding evident. palpable radial pulse.     Dressing applied. Patient tolerated well.

## 2023-10-02 NOTE — CONSULT NOTE ADULT - ASSESSMENT
58yFemale with history of of cerebral palsy, down syndrome, severe intellectual disability, nonverbal at baseline, pulmonary stenosis presents with coffee ground emesis. Hospital course complicated by evidence of contained D3 perforation. Palliative care consulted for GOC.    Patient has history of developmental delay and lives in a group home.  Reached out to [facility] about who possible decision makers may be.  Awaiting return phone call. Any decisions about withdrawing care must go through [facility].  Will follow      MEDD (morphine equivalent daily dose):    Education about palliative care provided to patient/family.  See Recs below.    Please call [phone] with questions or concerns 24/7.   We will continue to follow.

## 2023-10-02 NOTE — PROGRESS NOTE ADULT - ASSESSMENT
58F with PMHx of Down syndrome, cerebral palsy, severe intellectual disability, nonverbal at baseline, hypothyroidism, congenital pulmonary stenosis, chronic pressure ulcers, orthostatic hypotension, constipation, who presented to the ED on 9/28 for coffee ground emesis and AMS. Patient was recently admitted to the hospital for management of pneumonia and resultant hypoxic respiratory failure. Patient admitted to MICU for presumptive upper GI bleed. Surgery consulted for CT findings of duodenal perforation.     #Contained D3 Perforation   - No acute surgical intervention indicated at this time   - Recommend repeating CT A/P with oral/IV contrast prior to TF    - Monitor Hgb, transfuse > 7 as needed   - F/u blood cx results    - Keep OGT to LCWS – monitor outputs    - Continue PPI gtt   - Continue IV Abx - on Meropenem & Levaquin    - Strict I/O   - Surgery following        [id number] 58F with PMHx of Down syndrome, cerebral palsy, severe intellectual disability, nonverbal at baseline, hypothyroidism, congenital pulmonary stenosis, chronic pressure ulcers, orthostatic hypotension, constipation, who presented to the ED on 9/28 for coffee ground emesis and AMS. Patient was recently admitted to the hospital for management of pneumonia and resultant hypoxic respiratory failure. Patient admitted to MICU for presumptive upper GI bleed. Surgery consulted for CT findings of duodenal perforation.     #Contained D3 Perforation - seems to have resolved on follow up CT scan   - No acute surgical intervention indicated   - Monitor Hgb, transfuse > 7 as needed   - F/u blood cx results    - Keep OGT to LCWS – monitor outputs    - Continue PPI gtt   - Continue IV Abx - on Meropenem & Levaquin    - Strict I/O   - Surgery following     [id number]      Addendum:   Repeat CT A/P with oral and IV contrast obtained. Personally reviewed w/ Dr. Germain and Dr. Covarrubias. No contrast extravasation or air outside of GI tract.   Surgical team will sign off. OK to feed patient as tolerated. No restrictions.

## 2023-10-02 NOTE — ADVANCED PRACTICE NURSE CONSULT - ASSESSMENT
Reason for Admission: Upper GI bleed  History of Present Illness:   58 year old female with a PMHx of Down syndrome, nonverbal at baseline, hypothyroidism, cerebral palsy, congenital pulmonary stenosis presents to the ED for hemoptysis.    Patient was recently admitted to the hospital for management of pneumonia and resultant hypoxic respiratory failure.  Patient was noted of having hematemesis when she was admitted previously, a CT of the abdomen showed esophagitis for which GI recommended OP upper endoscopy.    On the day of presentation, the patient was less responsive with noted hemoptysis.   When asking the group home personnel, patient was not exhibiting any signs of respiratory distress, abdominal pain, cough, shortness of breath, fevers/ sweating, or any changes in bowel habits.      A CT of the abdomen showed foci of air in the lumen of the stomach, consistent with duodenal perforation.  The CT showed as well bibasilar infiltrates, consistent with pneumonia.    Surgery were consulted, as well as GI.  Patient was started on Protonix drip, and got admitted for management.       Review of Systems:  Unable to obtain due to: Dementia      Allergies and Intolerances:        Allergies:  	No Known Allergies:     Home Medications:   * Patient Currently Takes Medications as of 29-Sep-2023 11:28 documented in Structured Notes  · 	pantoprazole 40 mg oral delayed release tablet: Last Dose Taken:  , 1 tab(s) orally once a day  · 	Multiple Vitamins oral tablet: Last Dose Taken:  , 1 tab(s) orally once a day  · 	melatonin 5 mg oral tablet: Last Dose Taken:  , 1 tab(s) orally once a day (at bedtime)  · 	gabapentin 600 mg oral tablet: Last Dose Taken:  , 1 orally once a day  · 	raloxifene 60 mg oral tablet: Last Dose Taken:  , 1 tab(s) orally once a day    Patient received in bed on ventilator, limited mobility, incontinent of bowel and bladder, high risk for pressure injury development or progression. B/L heels intact at time of assessment, patient does have vascular changes to toes.    Wound #1   Type & Location: Left hand serum filled blisters noted  Size: multiple  Tissue Description: blisters filled with fluid  Wound Exudate : none  Wound Edge: intact  Periwound Condition: intact    Wound #2  Type & Location: Right plantar foot Deep Tissue Injury with Progression (two ulcerations noted)  Tissue Description: purple with areas of eschar  Wound Exudate : scant brown  Wound Edge: irrgeular  Periwound Condition: intact      Wound #3  Type & Location: Bilateral Buttock Moisture Associated Skin Damage  Tissue Description: multiple areas of pink denuded skin  Wound Exudate : scant, serous  Wound Edge: irregular  Periwound Condition: Macerated

## 2023-10-02 NOTE — PROGRESS NOTE ADULT - SUBJECTIVE AND OBJECTIVE BOX
GENERAL SURGERY PROGRESS NOTE    Patient: TEA ECHAVARRIA , 58y ([birth year])Female   MRN: [id number]  Location: [address]  Visit: 09-29-23 Inpatient  Date: 10-02-23 @ 00:09    Hospital Day #: 4d    Post-Op Day #: -      PROCEDURE/DX/INJURIES:    - Contained D3 perforation      INTERVAL HX:    No acute events overnight. Patient remains intubated, on pressors. OGT w/ bilious output.      PAST MEDICAL & SURGICAL HISTORY:   ·	Down syndrome   ·	Cerebral Palsy    ·	Epilepsy   ·	Congenital pulmonary stenosis   ·	Chronic pressure ulcers   ·	Osteoporosis   ·	Mild anemia   ·	Neuropathy   ·	S/P debridement of R hip on 3/2/21       VITALS:   T(F): 98.9 (10-01-23 @ 20:30), Max: 99.7 (10-01-23 @ 01:00)  HR: 64 (10-01-23 @ 23:30)  BP: 111/58 (10-01-23 @ 23:30)  RR: 16 (10-01-23 @ 23:30)  SpO2: 95% (10-01-23 @ 23:30)  Mode: AC/ CMV (Assist Control/ Continuous Mandatory Ventilation), RR (machine): 16, TV (machine): 350, FiO2: 40, PEEP: 8, ITime: 0.9, MAP: 12, PIP: 23    DIET:   Diet, NPO:   Except Medications (10-01-23 @ 00:31) [Active]      FLUIDS: lactated ringers.: Solution, 1000 milliLiter(s) infuse at 50 mL/Hr (09-29-23 @ 11:34)      I & O's:  09-30-23 @ 07:01  -  10-01-23 @ 07:00  --------------------------------------------------------  IN:    FentaNYL: 77.7 mL    IV PiggyBack: 550 mL    Lactated Ringers: 1680 mL    Norepinephrine: 156.4 mL    Norepinephrine: 112.2 mL    Pantoprazole: 10 mL    Sodium Chloride 0.9% Bolus: 250 mL  Total IN: 2836.3 mL    OUT:    Indwelling Catheter - Urethral (mL): 1315 mL    Other (mL): 0 mL    Propofol: 0 mL    Vasopressin: 0 mL  Total OUT: 1315 mL    Total NET: 1521.3 mL      10-01-23 @ 07:01  -  10-02-23 @ 00:09  --------------------------------------------------------  IN:    FentaNYL: 128.6 mL    IV PiggyBack: 100 mL    Lactated Ringers: 870 mL    Norepinephrine: 148.9 mL  Total IN: 1247.5 mL    OUT:    Indwelling Catheter - Urethral (mL): 40 mL    Voided (mL): 800 mL  Total OUT: 840 mL    Total NET: 407.5 mL      PHYSICAL EXAM:  General: sedated   HEENT: EOMI, sclera non-icteric.  Heart: RRR   Lungs: intubated   Abdomen:  Soft, nontender, nondistended.   MSK/Extremities: Warm & well-perfused.   Skin: Warm, dry. No jaundice.     MEDICATIONS  (STANDING):  chlorhexidine 0.12% Liquid 15 milliLiter(s) Oral Mucosa every 12 hours  chlorhexidine 2% Cloths 1 Application(s) Topical <User Schedule>  fentaNYL   Infusion. 0.5 MICROgram(s)/kG/Hr (2.77 mL/Hr) IV Continuous <Continuous>  gabapentin 600 milliGRAM(s) Oral daily  lactated ringers. 1000 milliLiter(s) (50 mL/Hr) IV Continuous <Continuous>  levoFLOXacin IVPB 750 milliGRAM(s) IV Intermittent every 48 hours  meropenem  IVPB 1000 milliGRAM(s) IV Intermittent every 12 hours  norepinephrine Infusion 0.05 MICROgram(s)/kG/Min (5.18 mL/Hr) IV Continuous <Continuous>  pantoprazole  Injectable 40 milliGRAM(s) IV Push two times a day    MEDICATIONS  (PRN):  acetaminophen   Oral Liquid .. 650 milliGRAM(s) Enteral Tube every 6 hours PRN Temp greater or equal to 38C (100.4F)      GI PROPHYLAXIS: pantoprazole  Injectable 40 milliGRAM(s) IV Push two times a day    ANTIBIOTICS:  levoFLOXacin IVPB 750 milliGRAM(s) + meropenem  IVPB 1000 milliGRAM(s)      LAB/STUDIES:                        10.2   13.55 )-----------( 171      ( 01 Oct 2023 04:43 )             31.4       Auto Neutrophil %: 88.8 % (10-01-23 @ 04:43)  Auto Immature Granulocyte %: 0.4 % (10-01-23 @ 04:43)    10-01    137  |  103  |  11  ----------------------------<  148<H>  3.4<L>   |  25  |  0.8      Calcium: 8.0 mg/dL (10-01-23 @ 04:43)  Magnesium: 1.6 mg/dL  (10-01-23 @ 04:43)      LFTs:             5.0  | 0.6  | 31       ------------------[73      ( 01 Oct 2023 04:43 )  2.2  | x    | 29          Lipase:x      Amylase:x         Blood Gas Arterial, Lactate: 1.30 mmol/L (10-01-23 @ 09:18)  Blood Gas Arterial, Lactate: 1.50 mmol/L (10-01-23 @ 03:48)  Blood Gas Arterial, Lactate: 2.00 mmol/L (09-30-23 @ 12:53)  Lactate, Blood: 3.2 mmol/L (09-30-23 @ 04:40)  Blood Gas Arterial, Lactate: 2.40 mmol/L (09-30-23 @ 03:21)  Lactate, Blood: 5.3 mmol/L (09-29-23 @ 20:42)  Blood Gas Arterial, Lactate: 4.20 mmol/L (09-29-23 @ 14:15)  Blood Gas Arterial, Lactate: 5.70 mmol/L (09-29-23 @ 09:06)  Blood Gas Venous - Lactate: 6.70 mmol/L (09-29-23 @ 07:27)    ABG - ( 01 Oct 2023 09:18 )  pH: 7.47  /  pCO2: 41    /  pO2: 112   / HCO3: 30    / Base Excess: 5.6   /  SaO2: 99.6      ABG - ( 01 Oct 2023 03:48 )  pH: 7.50  /  pCO2: 37    /  pO2: 116   / HCO3: 29    / Base Excess: 5.5   /  SaO2: 99.5      ABG - ( 30 Sep 2023 12:53 )  pH: 7.51  /  pCO2: 32    /  pO2: 140   / HCO3: 26    / Base Excess: 3.0   /  SaO2: 99.1        CARDIAC MARKERS ( 01 Oct 2023 04:43 )  x     / 0.11 ng/mL / x     / x     / x      CARDIAC MARKERS ( 30 Sep 2023 04:40 )  x     / 0.34 ng/mL / x     / x     / x              Urinalysis Basic - ( 01 Oct 2023 04:43 )    Color: x / Appearance: x / SG: x / pH: x  Gluc: 148 mg/dL / Ketone: x  / Bili: x / Urobili: x   Blood: x / Protein: x / Nitrite: x   Leuk Esterase: x / RBC: x / WBC x   Sq Epi: x / Non Sq Epi: x / Bacteria: x        Culture - Urine (collected 29 Sep 2023 10:10)  Source: Clean Catch Clean Catch (Midstream)  Final Report (01 Oct 2023 01:13):    <10,000 CFU/mL Normal Urogenital Mili    Culture - Blood (collected 29 Sep 2023 09:47)  Source: .Blood Blood-Peripheral  Preliminary Report (01 Oct 2023 17:01):    No growth at 48 Hours    Culture - Blood (collected 29 Sep 2023 09:47)  Source: .Blood Blood-Peripheral  Gram Stain (01 Oct 2023 09:15):    Growth in aerobic bottle: Gram Positive Rods and Gram positive cocci in    pairs    Growth in anaerobic bottle: Gram positive cocci in pairs  Preliminary Report (01 Oct 2023 17:50):    Growth in aerobic bottle: Corynebacterium amycolatum "Susceptibilities    not performed"    Growth in aerobic bottle: Gram positive cocci in pairs    Growth in anaerobic bottle: Gram positive cocci in pairs    Direct identification is available within approximately 3-5    hours either by Blood Panel Multiplexed PCR or Direct    MALDI-TOF. Details: [email or web address]      IMAGING:  #VA Duplex Upper Ext Vein Scan, Left (10.01.23 @ 13:46)   IMPRESSION:  No evidence of left upper extremity deep venous thrombosis.    Left basilic vein appears thrombosed.    --- End of Report ---        ACCESS/ DEVICES:  [X] Peripheral IV  [ ] Central Venous Line	[ ] R	[ ] L	[ ] IJ	[ ] Fem	[ ] SC	Placed:   [ ] Arterial Line		[ ] R	[ ] L	[ ] Fem	[ ] Rad	[ ] Ax	Placed:   [ ] PICC:					[ ] Mediport  [X] Urinary Catheter,  Date Placed:   [ ] Chest tube: [ ] Right, [ ] Left  [ ] SAUL/Masoud Drains  [X] OGT

## 2023-10-02 NOTE — PROGRESS NOTE ADULT - ASSESSMENT
TEA ECHAVARRIA is a 58F with PMH of Down syndrome, cerebral palsy, severe intellectual disability, nonverbal at baseline, hypothyroidism, congenital pulmonary stenosis, chronic pressure ulcers, orthostatic hypotension, constipation, presents to the ED for coffee ground emesis and AMS. Patient was recently admitted to the hospital for management of pneumonia and resultant hypoxic respiratory failure. Patient admitted to MICU for presumptive upper GI bleed. Surgery consulted for CT findings of duodenal perforation.    PLAN:  #Contained D3 Perforation  -per GI- recommend CTAP with IV conc per MICU and sepsis work up, EKG, Troponin workup  - Maintain active Type and screen  - Monitor Hgb, transfuse > 7 as needed  - Trend H&H BID  - intubated and sedated- vent 50/8    - No acute surgical intervention at this time   - Monitor OG tube outputs-   - continue ppi gtt   - Continue IV abx,- on meropenam    - Strict I/O   - Surgery following    #Handoff:  - [place]  -Palliative  -Burn

## 2023-10-02 NOTE — PROGRESS NOTE ADULT - ASSESSMENT
58 year old female with a PMHx of Down syndrome, nonverbal at baseline, hypothyroidism, cerebral palsy, congenital pulmonary stenosis presents to the ED for hemoptysis.    #Hypovolemic and septic shock  #Acute upper GI bleed s/p 2 units pRBC  #Possible perforation sp surgical evaluation  - CTA: foci of air in the stomach, consistent with duodenal perforation  - Hb 9.9, s/p 2u pRBC, Hb 14.7 > 12 -> 10.1-> 9.5  - Surgery: no surgical intervention at this time  - F/u CTAP w/contrast, performed waiting to be read  - wean levophed if possible  - LR @ 70  - Lactate trend 2.4 -> 2.00 -> 1.5, 1.3 -> 1.0   - Monitor CBC  - Echo results 9/30 EF 55 to 60 %, Normal left systolic function Diastolic function could not be assessed.   - c/w protonix BID    #Pneumonia, in the setting of recent hospitalization  #Likely aspiration  #Acute hypoxemic respiratory failure   - CXR b/l opacities  - Possible bronchoscopy 10/3  - ABG respiratory acidosis resolved  - MRSA negative  - 1 out of 2 BCx growing Corynebacterium amycolatum preliminarily   - wean down FiO2 for a saturation above 92%  - c/w propofol and fentanyl, keep sedated for now  - started Zosyn + Levaquin      #NELA likely prerenal, resolved  - Cr 1.6 > 1.2 > 1.1  - voiding trial      #NSTEMI likely type II  - EKG Sinus tachycardia, ST elevation, consider early repolarization  - Echo results 9/30 EF 55 to 60 %, Normal left systolic function Diastolic function could not be assessed.   - Trend trop: 0.34 -> 0.11     #Blisters on hands  - Burn team consulted      - DVT ppx: SCD  - Diet: NPO  - Activity: Bedrest

## 2023-10-02 NOTE — ADVANCED PRACTICE NURSE CONSULT - RECOMMEDATIONS
Wound and Skin care recommendations:   Cleanse wound #1 with soap and water, pat dry  then apply moisturizing lotion and leave open to air daily.  Cleanse Wound #2 with soap and water, pat dry and apply medi-honey, cover with Allevyn twice daily.  Cleanse Wound #3 with soap and water, pat dry and apply moisture barrier cream twice daily and as needed for soiling   Skin and incontinence care.  Pressure Injury Prevention.  Assess wound and inform primary provider of any changes.   Case discussed with primary RN.  Wound/ ostomy specialist to f/u as needed.   Place Podiatry consult for right plantar foot for further recomemndations.  Other recommendations per Primary team.

## 2023-10-02 NOTE — PROGRESS NOTE ADULT - ASSESSMENT
· Assessment	  58 year old female with a PMHx of Down syndrome, nonverbal at baseline, hypothyroidism, cerebral palsy, congenital pulmonary stenosis presents to the ED for hemoptysis.    ID is consulted for PNA  Recently admitted for UTI with ESBL E. coli and asp PNA, completed 7 days of meropenem  Febrile to 100.4 on admission, with leukocytosis  Lactic acidosis  NELA  9/29 BCx corynebacterium, Peptoniphilus > not true pathogens  9/29 UCx NG  CT showed new foci of extraluminal air along posterior wall of third portion   duodenum, most consistent with perforated ulcer; Bilateral lower lobe and right middle lobe multifocal pneumonia.  10/2 CT bibasilar opacities. Retroperitoneal gas resolved    Antibiotics:  Meropenem 9/29 - 9/30  Azithromycin 9/29  Clindamycin 9/29      IMPRESSION:  Multifocal pneumonia, likely aspiration  Duodenal perforation  Transaminitis, hepatocellular  Lactic acidosis  Leukocytosis    RECOMMENDATIONS:  - IV meropenem 1gm q12hrs (CrCl ~49)  - IV Levaquin 750mg q48h  - Sputum Cx  - Surgery follow up  - Trend WBC, fever curve, transaminases, creatinine daily  - Will continue to follow

## 2023-10-02 NOTE — PROGRESS NOTE ADULT - SUBJECTIVE AND OBJECTIVE BOX
24H events:    Patient is a 58y old Female who presents with a chief complaint of Upper GI bleed (02 Oct 2023 08:33)    Primary diagnosis of GI bleed      Today is 3d of hospitalization. This morning patient was seen and examined at bedside, resting comfortably in bed.    No events over night.    Code Status:    Family communication:  Contact date:  Name of person contacted:  Relationship to patient:  Communication details:  What matters most:    PAST MEDICAL & SURGICAL HISTORY  Down syndrome    Osteoporosis    Mild anemia    Neuropathy    S/P debridement  of R hip on 3/2/21      SOCIAL HISTORY:  Social History:      ALLERGIES:  No Known Allergies    MEDICATIONS:  STANDING MEDICATIONS  chlorhexidine 0.12% Liquid 15 milliLiter(s) Oral Mucosa every 12 hours  chlorhexidine 2% Cloths 1 Application(s) Topical <User Schedule>  fentaNYL   Infusion. 0.501 MICROgram(s)/kG/Hr IV Continuous <Continuous>  gabapentin 600 milliGRAM(s) Oral daily  lactated ringers. 1000 milliLiter(s) IV Continuous <Continuous>  levoFLOXacin IVPB 750 milliGRAM(s) IV Intermittent every 48 hours  meropenem  IVPB 1000 milliGRAM(s) IV Intermittent every 12 hours  norepinephrine Infusion 0.05 MICROgram(s)/kG/Min IV Continuous <Continuous>  pantoprazole  Injectable 40 milliGRAM(s) IV Push two times a day  silver sulfADIAZINE 1% Cream 1 Application(s) Topical daily    PRN MEDICATIONS  acetaminophen   Oral Liquid .. 650 milliGRAM(s) Enteral Tube every 6 hours PRN    VITALS:   T(F): 96.6  HR: 95  BP: 102/60  RR: 16  SpO2: 96%    PHYSICAL EXAM:  GENERAL:   ( x) NAD, lying in bed comfortably     (  ) obtunded     (  ) lethargic     (  ) somnolent    HEAD:   ( x) Atraumatic     (  ) hematoma     (  ) laceration (specify location:       )     NECK:  (x) Supple     (  ) neck stiffness     (  ) nuchal rigidity     (  )  no JVD     (  ) JVD present ( -- cm)    HEART:  Rate -->     (x) normal rate     (  ) bradycardic     (  ) tachycardic  Rhythm -->     (x) regular     (  ) regularly irregular     (  ) irregularly irregular  Murmurs -->     (x) normal s1s2     (  ) systolic murmur     (  ) diastolic murmur     (  ) continuous murmur      (  ) S3 present     (  ) S4 present    LUNGS:    * intubated  ( )Unlabored respirations     (  ) tachypnea  ( x) B/L air entry     (  ) decreased breath sounds in:  (location     )    ( x) no adventitious sound     (  ) crackles     (  ) wheezing      (  ) rhonchi      (specify location:       )  (  ) chest wall tenderness (specify location:       )    ABDOMEN:   ( x) Soft     (  ) tense   |   (  ) nondistended     (x  )mildly  distended   |   (  ) +BS     (  ) hypoactive bowel sounds     (  ) hyperactive bowel sounds  ( x) nontender     (  ) RUQ tenderness     (  ) RLQ tenderness     (  ) LLQ tenderness     (  ) epigastric tenderness     (  ) diffuse tenderness  (  ) Splenomegaly      (  ) Hepatomegaly      (  ) Jaundice     (  ) ecchymosis     EXTREMITIES:  ( ) Normal     (  ) Rash     (  ) ecchymosis     (  ) varicose veins      (  ) pitting edema     (  ) non-pitting edema   (  ) ulceration     (  ) gangrene:     (location:     ) contracted    NERVOUS SYSTEM:    *sedated, non verbal. Opens eyes when awakened.  ( ) A&Ox3     (  ) confused     (  ) lethargic  CN II-XII:     (  ) Intact     (  ) deficits found     (Specify:     )   Upper extremities:     (  ) no sensorimotor deficits     (  ) weakness     (  ) loss of proprioception/vibration     (  ) loss of touch/temperature (specify:    )  Lower extremities:     (  ) no sensorimotor deficits     (  ) weakness     (  ) loss of proprioception/vibration     (  ) loss of touch/temperature (specify:    )    SKIN:   (  ) No rashes or lesions     (  ) maculopapular rash     (  ) pustules     (  ) vesicles     (  ) ulcer     (  ) ecchymosis     (specify location:     )  * L hand blistering     AMPAC score :    (  ) Indwelling Madsen Catheter:   Date inserted:    Reason (  ) Critical illness     (  ) urinary retention    (  ) Accurate Ins/Outs Monitoring     (  ) CMO patient    (  ) Central Line:   Date inserted:  Location: (  ) Right IJ     (  ) Left IJ     (  ) Right Fem     (  ) Left Fem    (  ) SPC        (  ) pigtail       (  ) PEG tube       (  ) colostomy       (  ) jejunostomy  (  ) U-Dall    LABS:                        9.5    10.92 )-----------( 193      ( 02 Oct 2023 04:38 )             29.7     10-02    136  |  102  |  8<L>  ----------------------------<  73  3.8   |  25  |  0.6<L>    Ca    7.8<L>      02 Oct 2023 04:38  Mg     2.1     10-02    TPro  5.0<L>  /  Alb  2.6<L>  /  TBili  0.4  /  DBili  x   /  AST  24  /  ALT  23  /  AlkPhos  107  10-02      Urinalysis Basic - ( 02 Oct 2023 04:38 )    Color: x / Appearance: x / SG: x / pH: x  Gluc: 73 mg/dL / Ketone: x  / Bili: x / Urobili: x   Blood: x / Protein: x / Nitrite: x   Leuk Esterase: x / RBC: x / WBC x   Sq Epi: x / Non Sq Epi: x / Bacteria: x      ABG - ( 02 Oct 2023 03:02 )  pH, Arterial: 7.54  pH, Blood: x     /  pCO2: 34    /  pO2: 106   / HCO3: 29    / Base Excess: 6.6   /  SaO2: 99.0                  Culture - Urine (collected 29 Sep 2023 10:10)  Source: Clean Catch Clean Catch (Midstream)  Final Report (01 Oct 2023 01:13):    <10,000 CFU/mL Normal Urogenital Mili      CARDIAC MARKERS ( 01 Oct 2023 04:43 )  x     / 0.11 ng/mL / x     / x     / x          RADIOLOGY:    < from: CT Chest No Cont (10.01.23 @ 20:17) >  INTERPRETATION:  Reason for Exam: Aspiration.    Technique: CT of the chest was performed from the thoracic inlet to the   level of the adrenal glandswithout contrast injection. Coronal and   sagittal images have been submitted.    Comparison CT: August 4, 2023.    Findings:    Lungs, Pleura, and Airways: Endotracheal tube in place. New small   bilateral pleural effusions and increased patchy bilateral consolidated   opacities, predominantly involving the bilateral lower lobes and to a   lesser extent involving bilateral upper lobes and right middle lobe,   suspicious for pneumonia, possibly related to prior aspiration event.   Central tracheobronchial tree is grossly patent. No pneumothorax.    Mediastinum/Lymph Nodes:Enteric tube is noted within the esophagus, with   abundant contrast material noted within the mid to lower esophagus. No   enlarged thoracic lymph nodes.    Heart/Great Vessels: Heart size is within normal limits. Thoracic aorta   and pulmonary arteries are normal caliber. Trace pericardial fluid.    Bones and soft tissues: No acute osseous abnormality.    IMPRESSION:    1. Since August 4, 2023, increased patchy bilateral consolidated   opacities, predominantly involving bilateral lower lobes and to a lesser   extent involving bilateral upper and right middle lobes, suspicious for   pneumonia, possibly related to prior aspiration event; abundant contrast   material is noted within the mid to lower esophagus, likely reflecting   reflux subsequent to prior contrast administration.  2. New small bilateral pleural effusions.      Please see separately dictated report of concurrently performed   abdominopelvic CT forreport of intra-abdominal findings.    --- End of Report ---            STEVIE IRAHETA MD; Attending Radiologist  This document has been electronically signed. Oct  2 2023  8:50AM    < end of copied text >  < from: [facility] Duplex Upper Ext Vein Scan, Left (10.01.23 @ 13:46) >  ACC: [id number] EXAM:  DUPLEX EXT VEINS UPPER LT   ORDERED BY: GREGORY GUTIERRES     PROCEDURE DATE:  10/01/2023          INTERPRETATION:  CLINICAL INFORMATION: 58 years old female with left arm   swelling    TECHNIQUE: Duplex sonography of the LEFT UPPER extremity veins with color   and spectral Doppler, with and without compression.    FINDINGS:    The left internal jugular, subclavian, axillary and brachial veins are   patent and compressible where applicable.  Basilic vein appears thrombosed.  The cephalic vein (superficial vein) is patent and without thrombus.      IMPRESSION:  No evidence of left upper extremity deep venous thrombosis.    Left basilic vein appears thrombosed.    --- End of Report ---            LIZABETH CONNELL MD; Attending Vascular Surgeon  This document has been electronically signed. Oct  1 2023  8:03PM    < end of copied text >

## 2023-10-02 NOTE — PROGRESS NOTE ADULT - ASSESSMENT
IMPRESSION:    Acute hypoxemic respiratory failure  on 40%  Suspected upper GI bleed s/p 2 units pRBC  Possible perforation sp surgical evaluation   aspiration pneumonia  polymicrobial bacteremia   NSTEMI likely type II   NELA likely prerenal resolved  H/o CP  H/o seizures    PLAN:    CNS: SAT.  Assess MS     HEENT: Oral and ET tube care    PULMONARY: HOB at 45 degrees.  Vent changes  .  FiO2 40.  FU DTA.  monitor PPL and DP.  bronch     CARDIOVASCULAR: Wean levophed.  Continue gentle hydration.  ECHO noted.      GI: Keep NPO. Protonix BID.  Surgery follow up.  GI follow up.     RENAL:  Voiding trial Bladder scans.  FU lytes.  Correct as needed.      INFECTIOUS DISEASE:  abs per ID    HEMATOLOGICAL: DVT prophylaxis seq.  Monitor CBC.      ENDOCRINE:  Follow up FS.  Insulin protocol if needed.    MUSCULOSKELETAL: Bedrest.  Off loading.      Prognosis guarded.      DISPO: ICU    multiple ulcers

## 2023-10-02 NOTE — CHART NOTE - NSCHARTNOTEFT_GEN_A_CORE
Brief surgery note     CT A/P with oral and IV contrast reviewed in table rounds with Dr. Covarrubias. No contrast extravasation, no air outside of GI tract.   OK to feed patient.   Surgery team will sign off. No plans for any surgical intervention.   please call with any questions or concerns at [phone]

## 2023-10-03 DIAGNOSIS — J96.01 ACUTE RESPIRATORY FAILURE WITH HYPOXIA: ICD-10-CM

## 2023-10-03 LAB
ALBUMIN SERPL ELPH-MCNC: 2 G/DL — LOW (ref 3.5–5.2)
ALP SERPL-CCNC: 122 U/L — HIGH (ref 30–115)
ALT FLD-CCNC: 22 U/L — SIGNIFICANT CHANGE UP (ref 0–41)
ANION GAP SERPL CALC-SCNC: 9 MMOL/L — SIGNIFICANT CHANGE UP (ref 7–14)
AST SERPL-CCNC: 26 U/L — SIGNIFICANT CHANGE UP (ref 0–41)
BASE EXCESS BLDA CALC-SCNC: 4 MMOL/L — HIGH (ref -2–3)
BASOPHILS # BLD AUTO: 0.03 K/UL — SIGNIFICANT CHANGE UP (ref 0–0.2)
BASOPHILS NFR BLD AUTO: 0.5 % — SIGNIFICANT CHANGE UP (ref 0–1)
BILIRUB SERPL-MCNC: 0.2 MG/DL — SIGNIFICANT CHANGE UP (ref 0.2–1.2)
BUN SERPL-MCNC: 7 MG/DL — LOW (ref 10–20)
CALCIUM SERPL-MCNC: 7.5 MG/DL — LOW (ref 8.4–10.4)
CHLORIDE SERPL-SCNC: 102 MMOL/L — SIGNIFICANT CHANGE UP (ref 98–110)
CO2 SERPL-SCNC: 26 MMOL/L — SIGNIFICANT CHANGE UP (ref 17–32)
CREAT SERPL-MCNC: 0.5 MG/DL — LOW (ref 0.7–1.5)
EGFR: 109 ML/MIN/1.73M2 — SIGNIFICANT CHANGE UP
EOSINOPHIL # BLD AUTO: 0.22 K/UL — SIGNIFICANT CHANGE UP (ref 0–0.7)
EOSINOPHIL NFR BLD AUTO: 3.9 % — SIGNIFICANT CHANGE UP (ref 0–8)
GLUCOSE SERPL-MCNC: 83 MG/DL — SIGNIFICANT CHANGE UP (ref 70–99)
GRAM STN FLD: SIGNIFICANT CHANGE UP
HCO3 BLDA-SCNC: 30 MMOL/L — HIGH (ref 21–28)
HCT VFR BLD CALC: 29.4 % — LOW (ref 37–47)
HGB BLD-MCNC: 9.2 G/DL — LOW (ref 12–16)
IMM GRANULOCYTES NFR BLD AUTO: 0.7 % — HIGH (ref 0.1–0.3)
LYMPHOCYTES # BLD AUTO: 0.98 K/UL — LOW (ref 1.2–3.4)
LYMPHOCYTES # BLD AUTO: 17.6 % — LOW (ref 20.5–51.1)
MAGNESIUM SERPL-MCNC: 1.8 MG/DL — SIGNIFICANT CHANGE UP (ref 1.8–2.4)
MCHC RBC-ENTMCNC: 23.2 PG — LOW (ref 27–31)
MCHC RBC-ENTMCNC: 31.3 G/DL — LOW (ref 32–37)
MCV RBC AUTO: 74.2 FL — LOW (ref 81–99)
MONOCYTES # BLD AUTO: 0.42 K/UL — SIGNIFICANT CHANGE UP (ref 0.1–0.6)
MONOCYTES NFR BLD AUTO: 7.5 % — SIGNIFICANT CHANGE UP (ref 1.7–9.3)
NEUTROPHILS # BLD AUTO: 3.89 K/UL — SIGNIFICANT CHANGE UP (ref 1.4–6.5)
NEUTROPHILS NFR BLD AUTO: 69.8 % — SIGNIFICANT CHANGE UP (ref 42.2–75.2)
NRBC # BLD: 0 /100 WBCS — SIGNIFICANT CHANGE UP (ref 0–0)
PCO2 BLDA: 48 MMHG — SIGNIFICANT CHANGE UP (ref 25–48)
PH BLDA: 7.4 — SIGNIFICANT CHANGE UP (ref 7.35–7.45)
PLATELET # BLD AUTO: 176 K/UL — SIGNIFICANT CHANGE UP (ref 130–400)
PMV BLD: 10.4 FL — SIGNIFICANT CHANGE UP (ref 7.4–10.4)
PO2 BLDA: 86 MMHG — SIGNIFICANT CHANGE UP (ref 83–108)
POTASSIUM SERPL-MCNC: 3.5 MMOL/L — SIGNIFICANT CHANGE UP (ref 3.5–5)
POTASSIUM SERPL-SCNC: 3.5 MMOL/L — SIGNIFICANT CHANGE UP (ref 3.5–5)
PROT SERPL-MCNC: 4.5 G/DL — LOW (ref 6–8)
RBC # BLD: 3.96 M/UL — LOW (ref 4.2–5.4)
RBC # FLD: 19.3 % — HIGH (ref 11.5–14.5)
SAO2 % BLDA: 98.2 % — HIGH (ref 94–98)
SODIUM SERPL-SCNC: 137 MMOL/L — SIGNIFICANT CHANGE UP (ref 135–146)
SPECIMEN SOURCE: SIGNIFICANT CHANGE UP
WBC # BLD: 5.58 K/UL — SIGNIFICANT CHANGE UP (ref 4.8–10.8)
WBC # FLD AUTO: 5.58 K/UL — SIGNIFICANT CHANGE UP (ref 4.8–10.8)

## 2023-10-03 PROCEDURE — 99291 CRITICAL CARE FIRST HOUR: CPT

## 2023-10-03 PROCEDURE — 99233 SBSQ HOSP IP/OBS HIGH 50: CPT

## 2023-10-03 PROCEDURE — 71045 X-RAY EXAM CHEST 1 VIEW: CPT | Mod: 26

## 2023-10-03 RX ORDER — MEROPENEM 1 G/30ML
1000 INJECTION INTRAVENOUS EVERY 8 HOURS
Refills: 0 | Status: COMPLETED | OUTPATIENT
Start: 2023-10-03 | End: 2023-10-10

## 2023-10-03 RX ORDER — POLYETHYLENE GLYCOL 3350 17 G/17G
17 POWDER, FOR SOLUTION ORAL
Refills: 0 | Status: DISCONTINUED | OUTPATIENT
Start: 2023-10-03 | End: 2023-10-06

## 2023-10-03 RX ORDER — HEPARIN SODIUM 5000 [USP'U]/ML
5000 INJECTION INTRAVENOUS; SUBCUTANEOUS EVERY 12 HOURS
Refills: 0 | Status: DISCONTINUED | OUTPATIENT
Start: 2023-10-03 | End: 2023-10-09

## 2023-10-03 RX ADMIN — HEPARIN SODIUM 5000 UNIT(S): 5000 INJECTION INTRAVENOUS; SUBCUTANEOUS at 17:37

## 2023-10-03 RX ADMIN — PANTOPRAZOLE SODIUM 40 MILLIGRAM(S): 20 TABLET, DELAYED RELEASE ORAL at 05:27

## 2023-10-03 RX ADMIN — CHLORHEXIDINE GLUCONATE 15 MILLILITER(S): 213 SOLUTION TOPICAL at 05:28

## 2023-10-03 RX ADMIN — PANTOPRAZOLE SODIUM 40 MILLIGRAM(S): 20 TABLET, DELAYED RELEASE ORAL at 17:37

## 2023-10-03 RX ADMIN — MEROPENEM 100 MILLIGRAM(S): 1 INJECTION INTRAVENOUS at 21:01

## 2023-10-03 RX ADMIN — GABAPENTIN 600 MILLIGRAM(S): 400 CAPSULE ORAL at 11:39

## 2023-10-03 RX ADMIN — MEROPENEM 100 MILLIGRAM(S): 1 INJECTION INTRAVENOUS at 13:40

## 2023-10-03 RX ADMIN — CHLORHEXIDINE GLUCONATE 1 APPLICATION(S): 213 SOLUTION TOPICAL at 05:28

## 2023-10-03 RX ADMIN — CHLORHEXIDINE GLUCONATE 15 MILLILITER(S): 213 SOLUTION TOPICAL at 17:37

## 2023-10-03 RX ADMIN — POLYETHYLENE GLYCOL 3350 17 GRAM(S): 17 POWDER, FOR SOLUTION ORAL at 17:37

## 2023-10-03 RX ADMIN — MEROPENEM 100 MILLIGRAM(S): 1 INJECTION INTRAVENOUS at 05:27

## 2023-10-03 NOTE — PROGRESS NOTE ADULT - SUBJECTIVE AND OBJECTIVE BOX
24H events:    Patient is a 58y old Female who presents with a chief complaint of Upper GI bleed (03 Oct 2023 06:04)    Primary diagnosis of GI bleed      Day 1:      Today is 4d of hospitalization. This morning patient was seen and examined at bedside, resting comfortably in bed.    No acute or major events overnight. Patient denies fevers, chills, headache, chest pain, dyspnea, cough, nausea, vomiting, abdominal pain or BM changes.     Code Status:    Family communication:  Contact date:  Name of person contacted:  Relationship to patient:  Communication details:  What matters most:    PAST MEDICAL & SURGICAL HISTORY  Down syndrome    Osteoporosis    Mild anemia    Neuropathy    S/P debridement  of R hip on 3/2/21      SOCIAL HISTORY:  Social History:      ALLERGIES:  No Known Allergies    MEDICATIONS:  STANDING MEDICATIONS  chlorhexidine 0.12% Liquid 15 milliLiter(s) Oral Mucosa every 12 hours  chlorhexidine 2% Cloths 1 Application(s) Topical <User Schedule>  fentaNYL   Infusion 2 MICROgram(s)/kG/Hr IV Continuous <Continuous>  gabapentin 600 milliGRAM(s) Oral daily  heparin   Injectable 5000 Unit(s) SubCutaneous every 12 hours  levoFLOXacin IVPB 750 milliGRAM(s) IV Intermittent every 48 hours  meropenem  IVPB 1000 milliGRAM(s) IV Intermittent every 12 hours  norepinephrine Infusion 0.05 MICROgram(s)/kG/Min IV Continuous <Continuous>  pantoprazole  Injectable 40 milliGRAM(s) IV Push two times a day    PRN MEDICATIONS  acetaminophen   Oral Liquid .. 650 milliGRAM(s) Enteral Tube every 6 hours PRN    VITALS:   T(F): 98.9  HR: 76  BP: 109/71  RR: 32  SpO2: 95%    PHYSICAL EXAM:  GENERAL:   ( x) NAD, lying in bed comfortably     (  ) obtunded     (  ) lethargic     (  ) somnolent    HEAD:   ( x) Atraumatic     (  ) hematoma     (  ) laceration (specify location:       )     NECK:  (x) Supple     (  ) neck stiffness     (  ) nuchal rigidity     (  )  no JVD     (  ) JVD present ( -- cm)    HEART:  Rate -->     (x) normal rate     (  ) bradycardic     (  ) tachycardic  Rhythm -->     (x) regular     (  ) regularly irregular     (  ) irregularly irregular  Murmurs -->     (x) normal s1s2     (  ) systolic murmur     (  ) diastolic murmur     (  ) continuous murmur      (  ) S3 present     (  ) S4 present    LUNGS:   ( x)Unlabored respirations     (  ) tachypnea  ( x) B/L air entry     (  ) decreased breath sounds in:  (location     )    ( x) no adventitious sound     (  ) crackles     (  ) wheezing      (  ) rhonchi      (specify location:       )  (  ) chest wall tenderness (specify location:       )    ABDOMEN:   ( x) Soft     (  ) tense   |   (X  ) nondistended     (  ) distended   |   ( X ) +BS     (  ) hypoactive bowel sounds     (  ) hyperactive bowel sounds  ( x) nontender     (  ) RUQ tenderness     (  ) RLQ tenderness     (  ) LLQ tenderness     (  ) epigastric tenderness     (  ) diffuse tenderness  (  ) Splenomegaly      (  ) Hepatomegaly      (  ) Jaundice     (  ) ecchymosis     EXTREMITIES:  ( x) Normal     (  ) Rash     (  ) ecchymosis     (  ) varicose veins      (  ) pitting edema     (  ) non-pitting edema   (  ) ulceration     (  ) gangrene:     (location:     )    NERVOUS SYSTEM:    ( x) A&Ox3     (  ) confused     (  ) lethargic  CN II-XII:     (  ) Intact     (  ) deficits found     (Specify:     )   Upper extremities:     (  ) no sensorimotor deficits     (  ) weakness     (  ) loss of proprioception/vibration     (  ) loss of touch/temperature (specify:    )  Lower extremities:     (  ) no sensorimotor deficits     (  ) weakness     (  ) loss of proprioception/vibration     (  ) loss of touch/temperature (specify:    )    SKIN:   ( X ) No rashes or lesions     (  ) maculopapular rash     (  ) pustules     (  ) vesicles     (  ) ulcer     (  ) ecchymosis     (specify location:     )    AMPAC score :    (  ) Indwelling Madsen Catheter:   Date inserted:    Reason (  ) Critical illness     (  ) urinary retention    (  ) Accurate Ins/Outs Monitoring     (  ) CMO patient    (  ) Central Line:   Date inserted:  Location: (  ) Right IJ     (  ) Left IJ     (  ) Right Fem     (  ) Left Fem    (  ) SPC        (  ) pigtail       (  ) PEG tube       (  ) colostomy       (  ) jejunostomy  (  ) U-Dall    LABS:                        9.2    5.58  )-----------( 176      ( 03 Oct 2023 04:16 )             29.4     10-03    137  |  102  |  7<L>  ----------------------------<  83  3.5   |  26  |  0.5<L>    Ca    7.5<L>      03 Oct 2023 04:16  Mg     1.8     10-03    TPro  4.5<L>  /  Alb  2.0<L>  /  TBili  0.2  /  DBili  x   /  AST  26  /  ALT  22  /  AlkPhos  122<H>  10-03      Urinalysis Basic - ( 03 Oct 2023 04:16 )    Color: x / Appearance: x / SG: x / pH: x  Gluc: 83 mg/dL / Ketone: x  / Bili: x / Urobili: x   Blood: x / Protein: x / Nitrite: x   Leuk Esterase: x / RBC: x / WBC x   Sq Epi: x / Non Sq Epi: x / Bacteria: x      ABG - ( 03 Oct 2023 02:49 )  pH, Arterial: 7.40  pH, Blood: x     /  pCO2: 48    /  pO2: 86    / HCO3: 30    / Base Excess: 4.0   /  SaO2: 98.2                  Culture - Sputum (collected 02 Oct 2023 11:10)  Source: Trach Asp Tracheal Aspirate  Gram Stain (03 Oct 2023 07:33):    Moderate polymorphonuclear leukocytes per low power field    Rare Squamous epithelial cells per low power field    Few Gram positive cocci in pairs seen per oil power field    Rare Gram Positive Rods seen per oil power field    Rare Yeast like cells seen per oil power field          RADIOLOGY:                 24H events:    Patient is a 58y old Female who presents with a chief complaint of Upper GI bleed (03 Oct 2023 06:04)    Primary diagnosis of GI bleed      Today is 4d of hospitalization. This morning patient was seen and examined at bedside. Intubated. On Fentanyl and Levophed drip. Straight Cath 2x overnight and no BM. No acute or major events overnight.    Code Status:     Family communication:  Contact date:  Name of person contacted:  Relationship to patient:  Communication details:  What matters most:    PAST MEDICAL & SURGICAL HISTORY  Down syndrome  Osteoporosis  Mild anemia  Neuropathy  S/P debridement  of R hip on 3/2/21      SOCIAL HISTORY:  Social History: unable to obtain      ALLERGIES:  No Known Allergies    MEDICATIONS:  STANDING MEDICATIONS  chlorhexidine 0.12% Liquid 15 milliLiter(s) Oral Mucosa every 12 hours  chlorhexidine 2% Cloths 1 Application(s) Topical <User Schedule>  fentaNYL   Infusion 2 MICROgram(s)/kG/Hr IV Continuous <Continuous>  gabapentin 600 milliGRAM(s) Oral daily  heparin   Injectable 5000 Unit(s) SubCutaneous every 12 hours  levoFLOXacin IVPB 750 milliGRAM(s) IV Intermittent every 48 hours  meropenem  IVPB 1000 milliGRAM(s) IV Intermittent every 12 hours  norepinephrine Infusion 0.05 MICROgram(s)/kG/Min IV Continuous <Continuous>  pantoprazole  Injectable 40 milliGRAM(s) IV Push two times a day    PRN MEDICATIONS  acetaminophen   Oral Liquid .. 650 milliGRAM(s) Enteral Tube every 6 hours PRN    VITALS:   T(F): 98.9  HR: 76  BP: 109/71  RR: 32  SpO2: 95%    PHYSICAL EXAM:  GENERAL:   ( x) NAD, lying in bed comfortably     (  ) obtunded     (  ) lethargic     (  ) somnolent    HEAD:   ( x) Atraumatic     (  ) hematoma     (  ) laceration (specify location:       )     NECK:  (x) Supple     (  ) neck stiffness     (  ) nuchal rigidity     (  )  no JVD     (  ) JVD present ( -- cm)    HEART:  Rate -->     (x) normal rate     (  ) bradycardic     (  ) tachycardic  Rhythm -->     (x) regular     (  ) regularly irregular     (  ) irregularly irregular  Murmurs -->     (x) normal s1s2     (  ) systolic murmur     (  ) diastolic murmur     (  ) continuous murmur      (  ) S3 present     (  ) S4 present    LUNGS:   ( x)Unlabored respirations     (  ) tachypnea  ( x) B/L air entry     (  ) decreased breath sounds in:  (location     )    ( ) no adventitious sound     (  ) crackles     (  ) wheezing      ( x ) rhonchi      (specify location: bilaterally      )  (  ) chest wall tenderness (specify location:       )    ABDOMEN:   ( x) Soft     (  ) tense   |   (X  ) nondistended     (  ) distended   |   ( X ) +BS     (  ) hypoactive bowel sounds     (  ) hyperactive bowel sounds  ( x) nontender     (  ) RUQ tenderness     (  ) RLQ tenderness     (  ) LLQ tenderness     (  ) epigastric tenderness     (  ) diffuse tenderness  (  ) Splenomegaly      (  ) Hepatomegaly      (  ) Jaundice     (  ) ecchymosis     EXTREMITIES:  ( ) Normal     (  ) Rash     (  ) ecchymosis     (  ) varicose veins      (  ) pitting edema     ( x ) non-pitting edema   (  ) ulceration     (  ) gangrene:     (location: ;left hand wound      )    NERVOUS SYSTEM:  sedated  ( ) A&Ox3     (  ) confused     (  ) lethargic  CN II-XII:     (  ) Intact     (  ) deficits found     (Specify:     )   Upper extremities:     (  ) no sensorimotor deficits     (  ) weakness     (  ) loss of proprioception/vibration     (  ) loss of touch/temperature (specify:    )  Lower extremities:     (  ) no sensorimotor deficits     (  ) weakness     (  ) loss of proprioception/vibration     (  ) loss of touch/temperature (specify:    )    SKIN:   (  ) No rashes or lesions     (  ) maculopapular rash     (  ) pustules     (  ) vesicles     (  ) ulcer     (  ) ecchymosis     (specify location: left hand wound, right plantar wound    )    AMPAC score :    (  ) Indwelling Madsen Catheter:   Date inserted:    Reason (  ) Critical illness     (  ) urinary retention    (  ) Accurate Ins/Outs Monitoring     (  ) CMO patient    (  ) Central Line:   Date inserted:  Location: (  ) Right IJ     (  ) Left IJ     (  ) Right Fem     (  ) Left Fem    (  ) SPC        (  ) pigtail       (  ) PEG tube       (  ) colostomy       (  ) jejunostomy  (  ) U-Dall    LABS:                        9.2    5.58  )-----------( 176      ( 03 Oct 2023 04:16 )             29.4     10-03    137  |  102  |  7<L>  ----------------------------<  83  3.5   |  26  |  0.5<L>    Ca    7.5<L>      03 Oct 2023 04:16  Mg     1.8     10-03    TPro  4.5<L>  /  Alb  2.0<L>  /  TBili  0.2  /  DBili  x   /  AST  26  /  ALT  22  /  AlkPhos  122<H>  10-03      Urinalysis Basic - ( 03 Oct 2023 04:16 )    Color: x / Appearance: x / SG: x / pH: x  Gluc: 83 mg/dL / Ketone: x  / Bili: x / Urobili: x   Blood: x / Protein: x / Nitrite: x   Leuk Esterase: x / RBC: x / WBC x   Sq Epi: x / Non Sq Epi: x / Bacteria: x      ABG - ( 03 Oct 2023 02:49 )  pH, Arterial: 7.40  pH, Blood: x     /  pCO2: 48    /  pO2: 86    / HCO3: 30    / Base Excess: 4.0   /  SaO2: 98.2          Culture - Sputum (collected 02 Oct 2023 11:10)  Source: Trach Asp Tracheal Aspirate  Gram Stain (03 Oct 2023 07:33):    Moderate polymorphonuclear leukocytes per low power field    Rare Squamous epithelial cells per low power field    Few Gram positive cocci in pairs seen per oil power field    Rare Gram Positive Rods seen per oil power field    Rare Yeast like cells seen per oil power field          RADIOLOGY:        ACC: [id number] EXAM:  CT ABDOMEN AND PELVIS OC IC   ORDERED BY: CONCHA NOLASCO     PROCEDURE DATE:  10/01/2023          INTERPRETATION:  CLINICAL HISTORY: History of GI bleeding. Question of   duodenal perforation..    TECHNIQUE: Contiguous axial CTimages were obtained from the lower chest   to the pubic symphysis following administration of 95 cc Omnipaque 350   intravenous contrast. Oral contrast was administered. Reformatted images   in the coronal and sagittal planes were acquired.    COMPARISON: CT dated 9/29/2023.      FINDINGS:    Study limited by streak artifact secondary to patient arm positioning.    LOWER CHEST: Extensive patchy bibasilar opacities/consolidations   redemonstrated. Bilateral pleural effusions partially imaged.    HEPATOBILIARY: Unchanged.    SPLEEN: Unchanged    PANCREAS: Unchanged    ADRENAL GLANDS: Unchanged    KIDNEYS: No hydronephrosis.    ABDOMINOPELVIC NODES: Unchanged    PELVIC ORGANS: Interval removal of Madsen catheter.    PERITONEUM/MESENTERY/BOWEL: Previous seen foci of retroperitoneal gas no   longer identified. New cecal wall thickening, compatible with cecitis.   Small ascites layering in the pelvis. No evidence of bowel obstruction.   Feeding tube tip in stomach.    BONES/SOFT TISSUES: Thickening/infiltrative changes of the soft tissues   of the right lateral thigh (4/70). Unchanged osseous structures.      IMPRESSION:    1.  Extensive patchy bibasilar opacities/consolidations redemonstrated.   Bilateral pleural effusions partially imaged.  2.  Previous seen foci of retroperitoneal gas no longer identified.  3.  New cecal wall thickening, compatible with cecitis.  4.  Small ascites layering in the pelvis.  5.  Thickening/infiltrative changes of the soft tissues of the right   lateral thigh. Correlate with exam.    --- End of Report ---    < from: CT Chest No Cont (10.01.23 @ 20:17) >  INTERPRETATION:  Reason for Exam: Aspiration.    Technique: CT of the chest was performed from the thoracic inlet to the   level of the adrenal glandswithout contrast injection. Coronal and   sagittal images have been submitted.    Comparison CT: August 4, 2023.    Findings:    Lungs, Pleura, and Airways: Endotracheal tube in place. New small   bilateral pleural effusions and increased patchy bilateral consolidated   opacities, predominantly involving the bilateral lower lobes and to a   lesser extent involving bilateral upper lobes and right middle lobe,   suspicious for pneumonia, possibly related to prior aspiration event.   Central tracheobronchial tree is grossly patent. No pneumothorax.    Mediastinum/Lymph Nodes:Enteric tube is noted within the esophagus, with   abundant contrast material noted within the mid to lower esophagus. No   enlarged thoracic lymph nodes.    Heart/Great Vessels: Heart size is within normal limits. Thoracic aorta   and pulmonary arteries are normal caliber. Trace pericardial fluid.    Bones and soft tissues: No acute osseous abnormality.    IMPRESSION:    1. Since August 4, 2023, increased patchy bilateral consolidated   opacities, predominantly involving bilateral lower lobes and to a lesser   extent involving bilateral upper and right middle lobes, suspicious for   pneumonia, possibly related to prior aspiration event; abundant contrast   material is noted within the mid to lower esophagus, likely reflecting   reflux subsequent to prior contrast administration.  2. New small bilateral pleural effusions.      Please see separately dictated report of concurrently performed   abdominopelvic CT forreport of intra-abdominal findings.    --- End of Report ---            STEVIE IRAHETA MD; Attending Radiologist  This document has been electronically signed. Oct  2 2023  8:50AM    < end of copied text >  < from: [facility] Duplex Upper Ext Vein Scan, Left (10.01.23 @ 13:46) >  ACC: [id number] EXAM:  DUPLEX EXT VEINS UPPER LT   ORDERED BY: GREGORY GUTIERRES     PROCEDURE DATE:  10/01/2023          INTERPRETATION:  CLINICAL INFORMATION: 58 years old female with left arm   swelling    TECHNIQUE: Duplex sonography of the LEFT UPPER extremity veins with color   and spectral Doppler, with and without compression.    FINDINGS:    The left internal jugular, subclavian, axillary and brachial veins are   patent and compressible where applicable.  Basilic vein appears thrombosed.  The cephalic vein (superficial vein) is patent and without thrombus.      IMPRESSION:  No evidence of left upper extremity deep venous thrombosis.    Left basilic vein appears thrombosed.    --- End of Report ---            LIZABETH CONNELL MD; Attending Vascular Surgeon  This document has been electronically signed. Oct  1 2023  8:03PM    < end of copied text >

## 2023-10-03 NOTE — PROGRESS NOTE ADULT - ASSESSMENT
IMPRESSION:    Acute hypoxemic respiratory failure  on 40%  Suspected upper GI bleed s/p 2 units pRBC  Possible duodenal perforation sp surgical evaluation   aspiration pneumonia  polymicrobial bacteremia   NSTEMI likely type II   NELA likely prerenal resolved  H/o CP  H/o seizures    PLAN:    CNS: SAT.  Assess MS     HEENT: Oral and ET tube care    PULMONARY: HOB at 45 degrees.  Vent changes  .  FiO2 40.  FU DTA.  monitor PPL and DP.  bronch today    CARDIOVASCULAR: Wean levophed.   ECHO noted.      GI: feeding Protonix BID.  Surgery follow up.  GI follow up.     RENAL:  Voiding trial Bladder scans.  FU lytes.  Correct as needed.      INFECTIOUS DISEASE:  abs per ID    HEMATOLOGICAL: DVT prophylaxis seq.  Monitor CBC.      ENDOCRINE:  Follow up FS.  Insulin protocol if needed.    MUSCULOSKELETAL: Bedrest.  Off loading.      Prognosis guarded.      DISPO: ICU    multiple ulcers

## 2023-10-03 NOTE — PROGRESS NOTE ADULT - ASSESSMENT
58 female with PMH of cerebral palsy, non verbal, seizure disorder, down syndrome, impulsive control disorder,, multiple pressure ulcers, orthostatic hypotension (on midodrine), constipation, congenital pulmonary stenosis brought by EMS as she was found hypoxic , unresponsive , dark emesis on the field s/p intubation. In ER, patient was hypotensive, was started on pressors , s/p 2u prbc. GI consulted for dark emesis, found to have duodenal perforation    #Dark emesis with hypotension on pressors - duodenal perforation  #AHRF with PNA s/p intubation  - Hb on admission: 9.9 s/p 2u prbc in ER (last Hb in 8/23: 9.3)>>12 (9/30)> 9.5 (10/2)  - WBC: 11k, Lactate: 6.7>5.7   - INR: 1.18, BUN/Cr: 22/1.6  - not on AC  - AN: brown stool  - initially on 1.2 levo now on 0.3   - CTAP with duodenal perforation, proctocolitis  - elevated troponin  - abdomen soft  - 10/2 still on pressors, Iv abx  - CTAP with oral contrast: no free air extra luminal, cecitis  - clinically patient does not have any picture of cecitis, physical exam benign, last BM 10/1/23 per RN    PLAN:  - recommend Miralax BID with senna to have atleast 1-2 BMs per day  - Maintain active Type and screen  - no endoscopic intervention at this time  - c/w IV antibiotics  - c/w protonix PO BID 40mg atleast for 8 weeks and then once daily  - Please correct electrolytes (Target Na 135-145, Mg 1.7-2.2, K 3.5-5)  - Please correct INR to <1.5  - Please target Hb  >8  - Please avoid any NSAIDs, opioids  - If any unstable bleed, please call GI stat  - rest of the care per MICU, surgery team  - Follow up with our [facility] located at [address] [place] [address]. Phone Number: [phone]      recall us PRN

## 2023-10-03 NOTE — PROGRESS NOTE ADULT - SUBJECTIVE AND OBJECTIVE BOX
JERICHOTEA  58y, Female    All available historical data reviewed    OVERNIGHT EVENTS:  no fevers  Fio2 40%  on levo  responds to tactile stimuli  BMs    ROS:  unable to obtain history secondary to patient's mental status and/or sedation     VITALS:  T(F): 98.1, Max: 99.6 (10-03-23 @ 04:00)  HR: 59  BP: 92/46  RR: 16Vital Signs Last 24 Hrs  T(C): 36.7 (03 Oct 2023 12:00), Max: 37.6 (03 Oct 2023 04:00)  T(F): 98.1 (03 Oct 2023 12:00), Max: 99.6 (03 Oct 2023 04:00)  HR: 59 (03 Oct 2023 13:58) (54 - 86)  BP: 92/46 (03 Oct 2023 13:00) (78/39 - 123/59)  BP(mean): 67 (03 Oct 2023 13:00) (53 - 85)  RR: 16 (03 Oct 2023 13:00) (15 - 40)  SpO2: 92% (03 Oct 2023 13:58) (92% - 100%)    Parameters below as of 03 Oct 2023 12:00  Patient On (Oxygen Delivery Method): ventilator    O2 Concentration (%): 40    TESTS & MEASUREMENTS:                        9.2    5.58  )-----------( 176      ( 03 Oct 2023 04:16 )             29.4     10-03    137  |  102  |  7<L>  ----------------------------<  83  3.5   |  26  |  0.5<L>    Ca    7.5<L>      03 Oct 2023 04:16  Mg     1.8     10-03    TPro  4.5<L>  /  Alb  2.0<L>  /  TBili  0.2  /  DBili  x   /  AST  26  /  ALT  22  /  AlkPhos  122<H>  10-03    LIVER FUNCTIONS - ( 03 Oct 2023 04:16 )  Alb: 2.0 g/dL / Pro: 4.5 g/dL / ALK PHOS: 122 U/L / ALT: 22 U/L / AST: 26 U/L / GGT: x             Culture - Sputum (collected 10-02-23 @ 11:10)  Source: Trach Asp Tracheal Aspirate  Gram Stain (10-03-23 @ 07:33):    Moderate polymorphonuclear leukocytes per low power field    Rare Squamous epithelial cells per low power field    Few Gram positive cocci in pairs seen per oil power field    Rare Gram Positive Rods seen per oil power field    Rare Yeast like cells seen per oil power field    Culture - Urine (collected 09-29-23 @ 10:10)  Source: Clean Catch Clean Catch (Midstream)  Final Report (10-01-23 @ 01:13):    <10,000 CFU/mL Normal Urogenital Mili    Culture - Blood (collected 09-29-23 @ 09:47)  Source: .Blood Blood-Peripheral  Preliminary Report (10-02-23 @ 17:01):    No growth at 72 Hours    Culture - Blood (collected 09-29-23 @ 09:47)  Source: .Blood Blood-Peripheral  Gram Stain (10-01-23 @ 09:15):    Growth in aerobic bottle: Gram Positive Rods and Gram positive cocci in    pairs    Growth in anaerobic bottle: Gram positive cocci in pairs  Preliminary Report (10-02-23 @ 11:37):    Growth in aerobic bottle: Corynebacterium amycolatum "Susceptibilities    not performed"    Growth in anaerobic bottle: Peptoniphilus harei group "Susceptibilities    not performed"    Growth in aerobic bottle: Gram positive cocci in pairs    Direct identification is available within approximately 3-5    hours either by Blood Panel Multiplexed PCR or Direct    MALDI-TOF. Details: [email or web address]  Organism: Blood Culture PCR (10-01-23 @ 01:07)  Organism: Blood Culture PCR (10-01-23 @ 01:07)      Method Type: PCR      -  Blood PCR Panel: NEG      Urinalysis Basic - ( 03 Oct 2023 04:16 )    Color: x / Appearance: x / SG: x / pH: x  Gluc: 83 mg/dL / Ketone: x  / Bili: x / Urobili: x   Blood: x / Protein: x / Nitrite: x   Leuk Esterase: x / RBC: x / WBC x   Sq Epi: x / Non Sq Epi: x / Bacteria: x          RADIOLOGY & ADDITIONAL TESTS:  Personal review of radiological diagnostics performed  Echo and EKG results noted when applicable.     MEDICATIONS:  acetaminophen   Oral Liquid .. 650 milliGRAM(s) Enteral Tube every 6 hours PRN  chlorhexidine 0.12% Liquid 15 milliLiter(s) Oral Mucosa every 12 hours  chlorhexidine 2% Cloths 1 Application(s) Topical <User Schedule>  fentaNYL   Infusion 2 MICROgram(s)/kG/Hr IV Continuous <Continuous>  gabapentin 600 milliGRAM(s) Oral daily  heparin   Injectable 5000 Unit(s) SubCutaneous every 12 hours  levoFLOXacin IVPB 750 milliGRAM(s) IV Intermittent every 24 hours  meropenem  IVPB 1000 milliGRAM(s) IV Intermittent every 8 hours  norepinephrine Infusion 0.05 MICROgram(s)/kG/Min IV Continuous <Continuous>  pantoprazole  Injectable 40 milliGRAM(s) IV Push two times a day  polyethylene glycol 3350 17 Gram(s) Oral two times a day      ANTIBIOTICS:  levoFLOXacin IVPB 750 milliGRAM(s) IV Intermittent every 24 hours  meropenem  IVPB 1000 milliGRAM(s) IV Intermittent every 8 hours

## 2023-10-03 NOTE — PROGRESS NOTE ADULT - SUBJECTIVE AND OBJECTIVE BOX
Gastroenterology progress note:     Patient is a 58y old  Female who presents with a chief complaint of Upper GI bleed (03 Oct 2023 14:11)       Admitted on: 09-29-23    We are following the patient for: perforated duodenal ulcer       Interval History: CTAP noted, patient on OG feeds now    No acute events overnight.   - Diet - OG feeds  - last BM - 10/1/23      PAST MEDICAL & SURGICAL HISTORY:  Down syndrome      Osteoporosis      Mild anemia      Neuropathy      S/P debridement  of R hip on 3/2/21          MEDICATIONS  (STANDING):  chlorhexidine 0.12% Liquid 15 milliLiter(s) Oral Mucosa every 12 hours  chlorhexidine 2% Cloths 1 Application(s) Topical <User Schedule>  fentaNYL   Infusion 2 MICROgram(s)/kG/Hr (11.1 mL/Hr) IV Continuous <Continuous>  gabapentin 600 milliGRAM(s) Oral daily  heparin   Injectable 5000 Unit(s) SubCutaneous every 12 hours  levoFLOXacin IVPB 750 milliGRAM(s) IV Intermittent every 24 hours  meropenem  IVPB 1000 milliGRAM(s) IV Intermittent every 8 hours  norepinephrine Infusion 0.05 MICROgram(s)/kG/Min (5.18 mL/Hr) IV Continuous <Continuous>  pantoprazole  Injectable 40 milliGRAM(s) IV Push two times a day  polyethylene glycol 3350 17 Gram(s) Oral two times a day    MEDICATIONS  (PRN):  acetaminophen   Oral Liquid .. 650 milliGRAM(s) Enteral Tube every 6 hours PRN Temp greater or equal to 38C (100.4F)      Allergies  No Known Allergies      Review of Systems:   Cardiovascular:  No Chest Pain, No Palpitations  Respiratory:  No Cough, No Dyspnea  Gastrointestinal:  As described in HPI  Skin:  No Skin Lesions, No Jaundice  Neuro:  No Syncope, No Dizziness    Physical Examination:  T(C): 36.7 (10-03-23 @ 12:00), Max: 37.6 (10-03-23 @ 04:00)  HR: 59 (10-03-23 @ 13:58) (54 - 86)  BP: 92/46 (10-03-23 @ 13:00) (78/39 - 123/59)  RR: 16 (10-03-23 @ 13:00) (15 - 40)  SpO2: 92% (10-03-23 @ 13:58) (92% - 100%)      10-02-23 @ 07:01  -  10-03-23 @ 07:00  --------------------------------------------------------  IN: 3279.2 mL / OUT: 1550 mL / NET: 1729.2 mL    10-03-23 @ 07:01  -  10-03-23 @ 14:18  --------------------------------------------------------  IN: 415.1 mL / OUT: 0 mL / NET: 415.1 mL        GENERAL: AAOx0, no acute distress, sedated and vented  HEAD:  Atraumatic, Normocephalic  EYES: conjunctiva and sclera clear  NECK: Supple, no JVD or thyromegaly  CHEST/LUNG: Clear to auscultation bilaterally; No wheeze, rhonchi, or rales  HEART: Regular rate and rhythm; normal S1, S2, No murmurs.  ABDOMEN: Soft, nontender, nondistended; Bowel sounds present  NEUROLOGY: No asterixis or tremor.   SKIN: Intact, no jaundice     Data:                        9.2    5.58  )-----------( 176      ( 03 Oct 2023 04:16 )             29.4     Hgb trend:  9.2  10-03-23 @ 04:16  9.5  10-02-23 @ 04:38  10.1  10-02-23 @ 00:10  10.2  10-01-23 @ 04:43        10-03    137  |  102  |  7<L>  ----------------------------<  83  3.5   |  26  |  0.5<L>    Ca    7.5<L>      03 Oct 2023 04:16  Mg     1.8     10-03    TPro  4.5<L>  /  Alb  2.0<L>  /  TBili  0.2  /  DBili  x   /  AST  26  /  ALT  22  /  AlkPhos  122<H>  10-03    Liver panel trend:  TBili 0.2   /   AST 26   /   ALT 22   /   AlkP 122   /   Tptn 4.5   /   Alb 2.0    /   DBili --      10-03  TBili 0.4   /   AST 24   /   ALT 23   /   AlkP 107   /   Tptn 5.0   /   Alb 2.6    /   DBili --      10-02  TBili 0.6   /   AST 31   /   ALT 29   /   AlkP 73   /   Tptn 5.0   /   Alb 2.2    /   DBili --      10-01  TBili 0.8   /   AST 78   /   ALT 49   /   AlkP 80   /   Tptn 5.9   /   Alb 2.8    /   DBili --      09-30  TBili 0.4   /   AST 67   /   ALT 17   /   AlkP 78   /   Tptn 6.6   /   Alb 3.0    /   DBili --      09-29          Culture - Sputum (collected 02 Oct 2023 11:10)  Source: Trach Asp Tracheal Aspirate  Gram Stain (03 Oct 2023 07:33):    Moderate polymorphonuclear leukocytes per low power field    Rare Squamous epithelial cells per low power field    Few Gram positive cocci in pairs seen per oil power field    Rare Gram Positive Rods seen per oil power field    Rare Yeast like cells seen per oil power field         Radiology:    < from: CT Abdomen and Pelvis w/ Oral Cont and w/ IV Cont (10.01.23 @ 20:39) >  1.  Extensive patchy bibasilar opacities/consolidations redemonstrated.   Bilateral pleural effusions partially imaged.  2.  Previous seen foci of retroperitoneal gas no longer identified.  3.  New cecal wall thickening, compatible with cecitis.  4.  Small ascites layering in the pelvis.  5.  Thickening/infiltrative changes of the soft tissues of the right   lateral thigh. Correlate with exam.

## 2023-10-03 NOTE — PROGRESS NOTE ADULT - ASSESSMENT
ASSESSMENT & PLAN    TEA ECHAVARRIA is a 58F with PMH of Down syndrome, cerebral palsy, severe intellectual disability, nonverbal at baseline, hypothyroidism, congenital pulmonary stenosis, chronic pressure ulcers, orthostatic hypotension, constipation, presents to the ED for coffee ground emesis and AMS. Patient was recently admitted to the hospital for management of pneumonia and resultant hypoxic respiratory failure. Patient admitted to MICU for presumptive upper GI bleed. Surgery consulted for CT findings of duodenal perforation.    #Hypovolemic and septic shock  #Acute upper GI bleed s/p 2 units pRBC  #Possible perforation sp surgical evaluation  - Hb 9.9, s/p 2u pRBC, Hb 14.7 > 12 -> 10.1-> 9.5 -> 9.2  - Surgery: no plans for any surgical intervention. Pt is tolerated feeds  - CTAP w/ oral and IV contrast: No contrast extravasation, Previous seen foci of retroperitoneal gas no longer identified.  - wean levophed if possible  - Lactate trend 2.4 -> 2.00 -> 1.5, 1.3 -> 1.0   - CBC downtrending to 5.5 k   - c/w protonix BID    #Pneumonia, in the setting of recent hospitalization  #Likely aspiration  #Acute hypoxemic respiratory failure   - CXR b/l opacities  - Possible bronchoscopy 10/3  - SBT today 10/3 after bronchoscopy   - ID recs appreciated   - On IV meropenem and IV Levaquin, F/u with ID about the dosing of ABx  - F/u Sputum Cx  - MRSA negative  - wean down FiO2 for a saturation above 92%  - c/w fentanyl, keep sedated for now    #NELA likely prerenal, resolved  - Cr 1.6 > 1.2 > 1.1  - voiding trial  - possible barlow placement today 10/3    #NSTEMI likely type II  - EKG Sinus tachycardia, ST elevation, consider early repolarization  - Echo results 9/30 EF 55 to 60 %, Normal left systolic function Diastolic function could not be assessed.   - Trend trop: 0.34 -> 0.11     #Left hand Edema   - Burn team wound care recs appreciated   - Wound care: Please wash wound with soap and water, apply bacitracin, cover xeroform, wrap with cling twice a day.         - DVT ppx: Heparin  - Diet: NPO with tube feed 10 mL per hour  - Activity: Bedrest      #Handoff:       ASSESSMENT & PLAN    TEA ECHAVARRIA is a 58F with PMH of Down syndrome, cerebral palsy, severe intellectual disability, nonverbal at baseline, hypothyroidism, congenital pulmonary stenosis, chronic pressure ulcers, orthostatic hypotension, constipation, presents to the ED for coffee ground emesis and AMS. Patient was recently admitted to the hospital for management of pneumonia and resultant hypoxic respiratory failure. Patient admitted to MICU for presumptive upper GI bleed. Surgery consulted for CT findings of duodenal perforation.    #Hypovolemic and septic shock  #Acute upper GI bleed s/p 2 units pRBC  #Possible perforation sp surgical evaluation  - Hb 9.9, s/p 2u pRBC, Hb 14.7 > 12 -> 10.1-> 9.5 -> 9.2  - Surgery: no plans for any surgical intervention. Pt is tolerated feeds  - CTAP w/ oral and IV contrast: No contrast extravasation, Previous seen foci of retroperitoneal gas no longer identified.  - wean levophed if possible  - Lactate trend 2.4 -> 2.00 -> 1.5, 1.3 -> 1.0   - CBC downtrending to 5.5 k   - c/w protonix BID    #Pneumonia, in the setting of recent hospitalization  #Likely aspiration  #Acute hypoxemic respiratory failure   - CXR b/l opacities  - Possible bronchoscopy 10/3  - SBT today 10/3 after bronchoscopy   - ID recs appreciated   - On IV meropenem and IV Levaquin, F/u with ID about the dosing of ABx  - F/u Sputum Cx  - MRSA negative  - wean down FiO2 for a saturation above 92%  - c/w fentanyl, keep sedated for now    #NELA likely prerenal, resolved  - Cr 1.6 > 1.2 > 1.1  - voiding trial  - possible barlow placement today 10/3    #NSTEMI likely type II  - EKG Sinus tachycardia, ST elevation, consider early repolarization  - Echo results 9/30 EF 55 to 60 %, Normal left systolic function Diastolic function could not be assessed.   - Trend trop: 0.34 -> 0.11     #Left hand Edema   - Burn team wound care recs appreciated   - Wound care: Please wash wound with soap and water, apply bacitracin, cover xeroform, wrap with cling twice a day.         - DVT ppx: Heparin  - Diet: NPO with tube feed 10 mL per hour  - Activity: Bedrest      #Handoff:    - Increased feeds  -Bronchoscopy today

## 2023-10-03 NOTE — PROGRESS NOTE ADULT - ASSESSMENT
· Assessment	  58 year old female with a PMHx of Down syndrome, nonverbal at baseline, hypothyroidism, cerebral palsy, congenital pulmonary stenosis presents to the ED for hemoptysis.    ID is consulted for PNA  Recently admitted for UTI with ESBL E. coli and asp PNA, completed 7 days of meropenem  Febrile to 100.4 on admission, with leukocytosis  Lactic acidosis  NELA  9/29 BCx corynebacterium, Peptoniphilus > not true pathogens  9/29 UCx NG  CT showed new foci of extraluminal air along posterior wall of third portion   duodenum, most consistent with perforated ulcer; Bilateral lower lobe and right middle lobe multifocal pneumonia.  10/2 CT bibasilar opacities. Retroperitoneal gas resolved    Antibiotics:  Meropenem 9/29 - 9/30  Azithromycin 9/29  Clindamycin 9/29      IMPRESSION:  Multifocal pneumonia, likely aspiration with CXR with worsening opacity RLL. No change in Fio2  10/2 ET : mixed daniele  Nares ORSA NG  Legionella NG    Duodenal perforation  Transaminitis, hepatocellular  Lactic acidosis  Leukocytosis    RECOMMENDATIONS:  -no change in ABx  -f/u ET cultures  - IV meropenem 1gm q12hrs (CrCl ~49)  - IV Levaquin 750mg q48h

## 2023-10-03 NOTE — PROGRESS NOTE ADULT - ASSESSMENT
58yFemale with history of of cerebral palsy, down syndrome, severe intellectual disability, nonverbal at baseline, pulmonary stenosis presents with coffee ground emesis. Hospital course complicated by evidence of contained D3 perforation. Palliative care consulted for GOC.    Patient has history of developmental delay and lives in a group home.  Reached out to [facility] about who possible decision makers may be.  Awaiting return phone call. Any decisions about withdrawing or de-escalating care must go through [facility].  Will follow      MEDD (morphine equivalent daily dose):    Education about palliative care provided to patient/family.  See Recs below.    Please call [phone] with questions or concerns 24/7.   We will continue to follow.

## 2023-10-03 NOTE — CHART NOTE - NSCHARTNOTEFT_GEN_A_CORE
Registered Dietitian Follow-Up     Patient Profile Reviewed                           Yes [x]   No []     Nutrition History Previously Obtained        Yes []  No [x]       Pertinent Subjective Information: Limited to chart review at this time as pt intubated and unable to provide hx.     Pertinent Medical Interventions: Pt continues to be managed for Acute hypoxemic respiratory failure on 40%, Suspected upper GI bleed s/p 2 units pRBC, Possible duodenal perforation sp surgical evaluation, aspiration pneumonia, polymicrobial bacteremia, NSTEMI likely type II. No contrast extravasation or air outside of GI tract on CT A/P -> can feed pt per Surgery 10/2.      Diet order: Diet, NPO with Tube Feed:   Tube Feeding Modality: Nasogastric  Jevity 1.2 Jvuentino  Total Volume for 24 Hours (mL): 480  Continuous  Starting Tube Feed Rate {mL per Hour}: 20  Until Goal Tube Feed Rate (mL per Hour): 20  Tube Feed Duration (in Hours): 24  Tube Feed Start Time: 12:00 (10-03-23 @ 11:39) [Active]    Anthropometrics:  Height (cm): 145 (10-02-23 @ 12:00)  Weight (kg): 55.3 (10-02-23 @ 12:00)  BMI (kg/m2): 26.3 (10-02-23 @ 12:00)  IBW: 34.1 kg  UBW: 49.9 kg from 23 per EMR    Daily Weight in k.6 (10-03), Weight in k.7 (10-02), Weight in k.2 (10-01), Weight in k.2 ()  Weight Change:  trending up likely reflective of fluid retention     MEDICATIONS  (STANDING):  chlorhexidine 0.12% Liquid 15 milliLiter(s) Oral Mucosa every 12 hours  chlorhexidine 2% Cloths 1 Application(s) Topical <User Schedule>  fentaNYL   Infusion 2 MICROgram(s)/kG/Hr (11.1 mL/Hr) IV Continuous <Continuous>  gabapentin 600 milliGRAM(s) Oral daily  heparin   Injectable 5000 Unit(s) SubCutaneous every 12 hours  levoFLOXacin IVPB 750 milliGRAM(s) IV Intermittent every 24 hours  meropenem  IVPB 1000 milliGRAM(s) IV Intermittent every 8 hours  norepinephrine Infusion 0.05 MICROgram(s)/kG/Min (5.18 mL/Hr) IV Continuous <Continuous>  pantoprazole  Injectable 40 milliGRAM(s) IV Push two times a day  polyethylene glycol 3350 17 Gram(s) Oral two times a day    MEDICATIONS  (PRN):  acetaminophen   Oral Liquid .. 650 milliGRAM(s) Enteral Tube every 6 hours PRN Temp greater or equal to 38C (100.4F)    Pertinent Labs:                         9.2    5.58  )-----------( 176      ( 03 Oct 2023 04:16 )             29.4   10-03 @ 04:16: Na 137, BUN 7<L>, Cr 0.5<L>, BG 83, K+ 3.5, Phos --, Mg 1.8, Alk Phos 122<H>, ALT/SGPT 22, AST/SGOT 26, HbA1c --    Physical Findings:  - Appearance: sedated  - GI function: abdomen WDL; last bowel movement 01-Oct-2023  - Tubes: OGT  - Oral/Mouth cavity: NPO  - Edema: 2+ left hand; right hand edema  - Skin: WOCN 10/2: Left hand serum filled blisters, Right plantar foot Deep Tissue Injury with Progression (two ulcerations noted), Bilateral Buttock Moisture Associated Skin Damage     Nutrition Requirements:  Weight Used: dosing weight 55.3 kg     Estimated Energy Needs    Continue []  Adjust [x]  Energy Recommendations: 1333 kcal/day - PSE Ve 4.3 Tm 37.6    Estimated Protein Needs    Continue [x]  Adjust []  Protein Recommendations: 72-88 g/day (1.3-1.6 g/kg)     Estimated Fluid Needs        Continue [x]  Adjust []  Fluid Recommendations: 2222-2369 mL/day - 25-30ml/kg     Nutrient Intake: current TF provides 576kcal, 26g protein, 389ml free water     [x] Previous Nutrition Diagnosis: Inadequate Energy Intake            [x] Ongoing          [] Resolved    Nutrition Education: N/A     Goal/Expected Outcome: Pt to meet >90% & <105% estimated needs via EN in 3-5 days     Indicator/Monitoring: Monitor diet order, kcal intake, body composition, NFPE, labs  (lytes, BG, renal indices)    Recommendation:  When pt can receive full feeds, switch to intermittent 18hr feeds, start at 40ml/hr (given pt tolerating 20ml/hr) and advance to goal 60ml/hr. Add No Carb Prosource (1pkg = 15gms Protein) 1pkt once a day. -- will provide 1356kcal, 74g protein, 875ml free water.    Patient assessed at high nutrition risk; RD to follow in 3-5 days.   RD spectra [phone], also available on Teams.

## 2023-10-03 NOTE — PROGRESS NOTE ADULT - SUBJECTIVE AND OBJECTIVE BOX
HPI:  58 year old female with a PMHx of Down syndrome, nonverbal at baseline, hypothyroidism, cerebral palsy, congenital pulmonary stenosis presents to the ED for hemoptysis.    Interval history  -Patient seen at bedside  -She remains intubated     ADVANCE DIRECTIVES:     [x ] Full Code [ ] DNR  MOLST  [ ]  Living Will  [ ]   DECISION MAKER(s):  [ ] Health Care Proxy(s)  [ ] Surrogate(s)  [ ] Guardian           Name(s): Phone Number(s):      BASELINE (I)ADL(s) (prior to admission):    [place]: [ ]Total  [ ] Moderate [ ]Dependent  Palliative Performance Status Version 2:         %    [email or web address]_performance_[email or web address]    Allergies    No Known Allergies    Intolerances    MEDICATIONS  (STANDING):  chlorhexidine 0.12% Liquid 15 milliLiter(s) Oral Mucosa every 12 hours  chlorhexidine 2% Cloths 1 Application(s) Topical <User Schedule>  fentaNYL   Infusion 2 MICROgram(s)/kG/Hr (11.1 mL/Hr) IV Continuous <Continuous>  gabapentin 600 milliGRAM(s) Oral daily  heparin   Injectable 5000 Unit(s) SubCutaneous every 12 hours  levoFLOXacin IVPB 750 milliGRAM(s) IV Intermittent every 24 hours  meropenem  IVPB 1000 milliGRAM(s) IV Intermittent every 8 hours  norepinephrine Infusion 0.05 MICROgram(s)/kG/Min (5.18 mL/Hr) IV Continuous <Continuous>  pantoprazole  Injectable 40 milliGRAM(s) IV Push two times a day  polyethylene glycol 3350 17 Gram(s) Oral two times a day    MEDICATIONS  (PRN):  acetaminophen   Oral Liquid .. 650 milliGRAM(s) Enteral Tube every 6 hours PRN Temp greater or equal to 38C (100.4F)      PRESENT SYMPTOMS: [x ]Unable to obtain due to poor mentation   Source if other than patient:  [ ]Family   [ ]Team     Pain: [ ]yes [ ]no  QOL impact -   Location -                    Aggravating factors -  Quality -  Radiation -  Timing-  Severity (0-10 scale):  Minimal acceptable level (0-10 scale):     CPOT:  0  https://www.[email or web address]/getattachment/[email or web address]/Critical-Care-Pain-Observation-Tool-(CPOT)    PAIN AD Score:   [email or web address]/painad.pdf (press ctrl +  left click to view)    Dyspnea:                           [ ]None[ ]Mild [ ]Moderate [ ]Severe     Respiratory Distress Observation Scale (RDOS):   A score of 0 to 2 signifies little or no respiratory distress, 3 signifies mild distress, scores 4 to 6 indicate moderate distress, and scores greater than 7 signify severe distress  [email or web address]/sites/default/files/[email or web address]-vlvajmpukqf-egjoxyyc-lnfuqeccsqp-kuonj.pdf    Anxiety:                             [ ]None[ ]Mild [ ]Moderate [ ]Severe   Fatigue:                             [ ]None[ ]Mild [ ]Moderate [ ]Severe   Nausea:                             [ ]None[ ]Mild [ ]Moderate [ ]Severe   Loss of appetite:              [ ]None[ ]Mild [ ]Moderate [ ]Severe   Constipation:                    [ ]None[ ]Mild [ ]Moderate [ ]Severe    Other Symptoms:  [ ]All other review of systems negative     Palliative Performance Status Version 2:         10%    [email or web address]://[email or web address]/[email or web address]_performance_scale_[email or web address]    PHYSICAL EXAM:  Vital Signs Last 24 Hrs  T(C): 36.1 (03 Oct 2023 16:00), Max: 37.6 (03 Oct 2023 04:00)  T(F): 96.9 (03 Oct 2023 16:00), Max: 99.6 (03 Oct 2023 04:00)  HR: 55 (03 Oct 2023 16:00) (54 - 88)  BP: 102/50 (03 Oct 2023 16:00) (78/39 - 123/59)  BP(mean): 72 (03 Oct 2023 16:00) (53 - 85)  RR: 17 (03 Oct 2023 16:00) (15 - 40)  SpO2: 94% (03 Oct 2023 16:00) (92% - 100%)    Parameters below as of 03 Oct 2023 16:00  Patient On (Oxygen Delivery Method): ventilator    O2 Concentration (%): 40    GENERAL:  [ x] No acute distress [ ]Lethargic  [ x]Unarousable  [ ]Verbal  [x ]Non-Verbal [ ]Cachexia    BEHAVIORAL/PSYCH:  [ ]Alert and Oriented x  [ ] Anxiety [ ] Delirium [ ] Agitation [ x] Calm   EYES: [ ] No scleral icterus [ ] Scleral icterus [x ] Closed  ENMT:  [ ]Dry mouth  [ x]No external oral lesions [ ] No external ear or nose lesions  CARDIOVASCULAR:  [ ]Regular [ ]Irregular [x ]Tachy [ ]Not Tachy  [ ]Raheem [ ] Edema [ ] No edema  PULMONARY:  [ ]Tachypnea  [ ]Audible excessive secretions [x] No labored breathing [ ] labored breathing  GASTROINTESTINAL: [ ]Soft  [ ]Distended  [ ]Not distended [ ]Non tender [ ]Tender  MUSCULOSKELETAL: [ ]No clubbing [ ] clubbing  [x ] No cyanosis [ ] cyanosis  NEUROLOGIC: [ ]No focal deficits  [ ]Follows commands  [x ]Does not follow commands  [ ]Cognitive impairment  [ ]Dysphagia  [ ]Dysarthria  [ ]Paresis   SKIN: [ ] Jaundiced [ ] Non-jaundiced [ ]Rash [x]No Rash [ ] Warm [ ] Dry  MISC/LINES: [ x] ET tube [ ] Trach [ ]NGT/OGT [ ]PEG [ ]Madsen    LABS: reviewed by me                          9.2    5.58  )-----------( 176      ( 03 Oct 2023 04:16 )             29.4       10-03    137  |  102  |  7<L>  ----------------------------<  83  3.5   |  26  |  0.5<L>    Ca    7.5<L>      03 Oct 2023 04:16  Mg     1.8     10-03    TPro  4.5<L>  /  Alb  2.0<L>  /  TBili  0.2  /  DBili  x   /  AST  26  /  ALT  22  /  AlkPhos  122<H>  10-03          ABG - ( 03 Oct 2023 02:49 )  pH, Arterial: 7.40  pH, Blood: x     /  pCO2: 48    /  pO2: 86    / HCO3: 30    / Base Excess: 4.0   /  SaO2: 98.2                Urinalysis Basic - ( 03 Oct 2023 04:16 )    Color: x / Appearance: x / SG: x / pH: x  Gluc: 83 mg/dL / Ketone: x  / Bili: x / Urobili: x   Blood: x / Protein: x / Nitrite: x   Leuk Esterase: x / RBC: x / WBC x   Sq Epi: x / Non Sq Epi: x / Bacteria: x                  CAPILLARY BLOOD GLUCOSE                  RADIOLOGY & ADDITIONAL STUDIES: reviewed by me    < from: Xray Chest 1 View- PORTABLE-Routine (10.03.23 @ 06:16) >  Impression:    Mild increase in bilateral opacities.    < end of copied text >    EKG: reviewed by me    < from: 12 Lead ECG (09.29.23 @ 10:17) >  Ventricular Rate 103 BPM    Atrial Rate 103 BPM    P-R Interval 128 ms    QRS Duration 66 ms    Q-T Interval 322 ms    QTC Calculation(Bazett) 421 ms    P Axis 69 degrees    R Axis 71 degrees    T Axis 73 degrees    Diagnosis Line Sinus tachycardia  ST elevation, consider early repolarization  Borderline ECG    < end of copied text >        PROTEIN CALORIE MALNUTRITION PRESENT: [ ]mild [ ]moderate [ ]severe [ ]underweight [ ]morbid obesity  https://www.[facility].org/vault/2440/web/files/ONC/Table_Clinical%20Characteristics%20to%20Document%20Malnutrition-White%20JV%20et%20al%518068.pdf    Height (cm): 142.2 (08-12-23 @ 00:41), 142.2 (08-04-23 @ 09:15)  Weight (kg): 52.1 (08-15-23 @ 00:00), 74.4 (08-11-23 @ 21:15)  BMI (kg/m2): 25.8 (08-15-23 @ 00:00), 36.8 (08-12-23 @ 00:41), 36.8 (08-11-23 @ 21:15)  [ ]PPSV2 < or = to 30% [ ]significant weight loss  [ ]poor nutritional intake  [ ]anasarca      [ ]Artificial Nutrition      Palliative Care Spiritual/Emotional Screening Tool Question  Severity (0-4):                    OR                    [ x] Unable to determine. Will assess at later time if appropriate.  Score of 2 or greater indicates recommendation of Chaplaincy and/or SW referral  Chaplaincy Referral: [ ] Yes [ ] Refused [ ] Following     Caregiver Ronald:  [ ] Yes [ ] No    OR    [x ] Unable to determine. Will assess at later time if appropriate.  Social Work Referral [ ]  Patient and Family Centered Care Referral [ ]    Anticipatory Grief Present: [ ] Yes [ ] No    OR     [ x] Unable to determine. Will assess at later time if appropriate.  Social Work Referral [ ]  Patient and Family Centered Care Referral [ ]    Patient discussed with primary medical team MD  Palliative care education provided to patient and/or family

## 2023-10-03 NOTE — PROGRESS NOTE ADULT - ATTENDING COMMENTS
no new changes. weaning off pressors. abdomen soft. will re image.
ACS Attending  Note Attestation    Patient is examined and evaluated at the bedside with the residents/PAs. Treatment plan discussed with the team, nurses, and consulting physicians and consulting teams. Medications, radiological studies and all other relevant studies reviewed.     TEA ECHAVARRIA Patient is a 58y old  Female who presents with a chief complaint of Upper GI bleed, ? retroperitoneal duodenal perforation      Vital Signs Last 24 Hrs  T(C): 37.2 (01 Oct 2023 20:30), Max: 37.6 (01 Oct 2023 01:00)  T(F): 98.9 (01 Oct 2023 20:30), Max: 99.7 (01 Oct 2023 01:00)  HR: 64 (01 Oct 2023 23:30) (60 - 117)  BP: 111/58 (01 Oct 2023 23:30) (84/51 - 147/96)  BP(mean): 79 (01 Oct 2023 23:30) (61 - 111)  RR: 16 (01 Oct 2023 23:30) (13 - 26)  SpO2: 95% (01 Oct 2023 23:30) (70% - 100%)    Parameters below as of 01 Oct 2023 20:30  Patient On (Oxygen Delivery Method): ventilator    O2 Concentration (%): 40                        10.2   13.55 )-----------( 171      ( 01 Oct 2023 04:43 )             31.4     10-01    137  |  103  |  11  ----------------------------<  148<H>  3.4<L>   |  25  |  0.8    Ca    8.0<L>      01 Oct 2023 04:43  Mg     1.6     10-01    TPro  5.0<L>  /  Alb  2.2<L>  /  TBili  0.6  /  DBili  x   /  AST  31  /  ALT  29  /  AlkPhos  73  10-01      Diagnosis: sepsis, GI bleed    Plan:	  - supportive care  - GI/DVT prophylaxis  - pain management  - CT scan with PO contrast to evaluate perforation of duodenum   - follow up consults  - repeat studies as needed  - replace electrolytes  - case management evaluation     Chela Germain MD, FACS  Trauma/ACS/Surgical Critical Care Attending
I edited the note
I edited the note
pressors coming down. abdomen soft. repeat CT shown no air outside the duodenum (that was present on the previous scan). no new recs. will sign off.

## 2023-10-03 NOTE — PROGRESS NOTE ADULT - SUBJECTIVE AND OBJECTIVE BOX
Over Night Events: events noted, reman critically ill, still intubated, ventilated, ID/ palliative/ sx noted    PHYSICAL EXAM    ICU Vital Signs Last 24 Hrs  T(C): 37.6 (03 Oct 2023 04:00), Max: 37.6 (03 Oct 2023 04:00)  T(F): 99.6 (03 Oct 2023 04:00), Max: 99.6 (03 Oct 2023 04:00)  HR: 65 (03 Oct 2023 04:15) (45 - 131)  BP: 117/58 (03 Oct 2023 04:15) (78/39 - 169/75)  BP(mean): 83 (03 Oct 2023 04:15) (53 - 108)  RR: 16 (03 Oct 2023 04:15) (15 - 40)  SpO2: 94% (03 Oct 2023 04:15) (91% - 100%)    O2 Parameters below as of 03 Oct 2023 04:00  Patient On (Oxygen Delivery Method): ventilator    O2 Concentration (%): 40        General: ill looking  ETT  Lungs: Bilateral rhonchi  Cardiovascular: Regular   Abdomen: Soft, Positive BS  Extremities: No clubbing   not following commands      10-01-23 @ 07:01  -  10-02-23 @ 07:00  --------------------------------------------------------  IN:    FentaNYL: 206.3 mL    IV PiggyBack: 200 mL    Lactated Ringers: 1220 mL    Norepinephrine: 185.3 mL  Total IN: 1811.6 mL    OUT:    Indwelling Catheter - Urethral (mL): 40 mL    Intermittent Catheterization - Urethral (mL): 700 mL    Voided (mL): 1175 mL  Total OUT: 1915 mL    Total NET: -103.4 mL      10-02-23 @ 07:01  -  10-03-23 @ 06:05  --------------------------------------------------------  IN:    FentaNYL: 99.9 mL    FentaNYL: 111 mL    IV PiggyBack: 100 mL    Jevity 1.2: 120 mL    Lactated Ringers: 1000 mL    Lactated Ringers Bolus: 1500 mL    Norepinephrine: 71.5 mL  Total IN: 3002.4 mL    OUT:    Intermittent Catheterization - Urethral (mL): 850 mL  Total OUT: 850 mL    Total NET: 2152.4 mL          LABS:                          9.2    5.58  )-----------( 176      ( 03 Oct 2023 04:16 )             29.4                                               10-02    136  |  102  |  8<L>  ----------------------------<  73  3.8   |  25  |  0.6<L>    Ca    7.8<L>      02 Oct 2023 04:38  Mg     2.1     10-02    TPro  5.0<L>  /  Alb  2.6<L>  /  TBili  0.4  /  DBili  x   /  AST  24  /  ALT  23  /  AlkPhos  107  10-02                                             Urinalysis Basic - ( 02 Oct 2023 04:38 )    Color: x / Appearance: x / SG: x / pH: x  Gluc: 73 mg/dL / Ketone: x  / Bili: x / Urobili: x   Blood: x / Protein: x / Nitrite: x   Leuk Esterase: x / RBC: x / WBC x   Sq Epi: x / Non Sq Epi: x / Bacteria: x                                                  LIVER FUNCTIONS - ( 02 Oct 2023 04:38 )  Alb: 2.6 g/dL / Pro: 5.0 g/dL / ALK PHOS: 107 U/L / ALT: 23 U/L / AST: 24 U/L / GGT: x                                                                                               Mode: AC/ CMV (Assist Control/ Continuous Mandatory Ventilation)  RR (machine): 16  TV (machine): 300  FiO2: 40  PEEP: 8  ITime: 0.9  MAP: 12  PIP: 25                                      ABG - ( 03 Oct 2023 02:49 )  pH, Arterial: 7.40  pH, Blood: x     /  pCO2: 48    /  pO2: 86    / HCO3: 30    / Base Excess: 4.0   /  SaO2: 98.2                MEDICATIONS  (STANDING):  chlorhexidine 0.12% Liquid 15 milliLiter(s) Oral Mucosa every 12 hours  chlorhexidine 2% Cloths 1 Application(s) Topical <User Schedule>  fentaNYL   Infusion 2 MICROgram(s)/kG/Hr (11.1 mL/Hr) IV Continuous <Continuous>  gabapentin 600 milliGRAM(s) Oral daily  lactated ringers. 1000 milliLiter(s) (50 mL/Hr) IV Continuous <Continuous>  levoFLOXacin IVPB 750 milliGRAM(s) IV Intermittent every 48 hours  meropenem  IVPB 1000 milliGRAM(s) IV Intermittent every 12 hours  norepinephrine Infusion 0.05 MICROgram(s)/kG/Min (5.18 mL/Hr) IV Continuous <Continuous>  pantoprazole  Injectable 40 milliGRAM(s) IV Push two times a day    MEDICATIONS  (PRN):  acetaminophen   Oral Liquid .. 650 milliGRAM(s) Enteral Tube every 6 hours PRN Temp greater or equal to 38C (100.4F)      cxr noted     Over Night Events: events noted, reman critically ill, still intubated, ventilated, ID/ palliative/ sx noted, fentanyl, levophed 0.05, CT AP    PHYSICAL EXAM    ICU Vital Signs Last 24 Hrs  T(C): 37.6 (03 Oct 2023 04:00), Max: 37.6 (03 Oct 2023 04:00)  T(F): 99.6 (03 Oct 2023 04:00), Max: 99.6 (03 Oct 2023 04:00)  HR: 65 (03 Oct 2023 04:15) (45 - 131)  BP: 117/58 (03 Oct 2023 04:15) (78/39 - 169/75)  BP(mean): 83 (03 Oct 2023 04:15) (53 - 108)  RR: 16 (03 Oct 2023 04:15) (15 - 40)  SpO2: 94% (03 Oct 2023 04:15) (91% - 100%)    O2 Parameters below as of 03 Oct 2023 04:00  Patient On (Oxygen Delivery Method): ventilator    O2 Concentration (%): 40        General: ill looking  ETT  Lungs: Bilateral rhonchi  Cardiovascular: Regular   Abdomen: Soft, Positive BS  Extremities: No clubbing   not following commands      10-01-23 @ 07:01  -  10-02-23 @ 07:00  --------------------------------------------------------  IN:    FentaNYL: 206.3 mL    IV PiggyBack: 200 mL    Lactated Ringers: 1220 mL    Norepinephrine: 185.3 mL  Total IN: 1811.6 mL    OUT:    Indwelling Catheter - Urethral (mL): 40 mL    Intermittent Catheterization - Urethral (mL): 700 mL    Voided (mL): 1175 mL  Total OUT: 1915 mL    Total NET: -103.4 mL      10-02-23 @ 07:01  -  10-03-23 @ 06:05  --------------------------------------------------------  IN:    FentaNYL: 99.9 mL    FentaNYL: 111 mL    IV PiggyBack: 100 mL    Jevity 1.2: 120 mL    Lactated Ringers: 1000 mL    Lactated Ringers Bolus: 1500 mL    Norepinephrine: 71.5 mL  Total IN: 3002.4 mL    OUT:    Intermittent Catheterization - Urethral (mL): 850 mL  Total OUT: 850 mL    Total NET: 2152.4 mL          LABS:                          9.2    5.58  )-----------( 176      ( 03 Oct 2023 04:16 )             29.4                                               10-02    136  |  102  |  8<L>  ----------------------------<  73  3.8   |  25  |  0.6<L>    Ca    7.8<L>      02 Oct 2023 04:38  Mg     2.1     10-02    TPro  5.0<L>  /  Alb  2.6<L>  /  TBili  0.4  /  DBili  x   /  AST  24  /  ALT  23  /  AlkPhos  107  10-02                                             Urinalysis Basic - ( 02 Oct 2023 04:38 )    Color: x / Appearance: x / SG: x / pH: x  Gluc: 73 mg/dL / Ketone: x  / Bili: x / Urobili: x   Blood: x / Protein: x / Nitrite: x   Leuk Esterase: x / RBC: x / WBC x   Sq Epi: x / Non Sq Epi: x / Bacteria: x                                                  LIVER FUNCTIONS - ( 02 Oct 2023 04:38 )  Alb: 2.6 g/dL / Pro: 5.0 g/dL / ALK PHOS: 107 U/L / ALT: 23 U/L / AST: 24 U/L / GGT: x                                                                                               Mode: AC/ CMV (Assist Control/ Continuous Mandatory Ventilation)  RR (machine): 16  TV (machine): 300  FiO2: 40  PEEP: 8  ITime: 0.9  MAP: 12  PIP: 25                                      ABG - ( 03 Oct 2023 02:49 )  pH, Arterial: 7.40  pH, Blood: x     /  pCO2: 48    /  pO2: 86    / HCO3: 30    / Base Excess: 4.0   /  SaO2: 98.2                MEDICATIONS  (STANDING):  chlorhexidine 0.12% Liquid 15 milliLiter(s) Oral Mucosa every 12 hours  chlorhexidine 2% Cloths 1 Application(s) Topical <User Schedule>  fentaNYL   Infusion 2 MICROgram(s)/kG/Hr (11.1 mL/Hr) IV Continuous <Continuous>  gabapentin 600 milliGRAM(s) Oral daily  lactated ringers. 1000 milliLiter(s) (50 mL/Hr) IV Continuous <Continuous>  levoFLOXacin IVPB 750 milliGRAM(s) IV Intermittent every 48 hours  meropenem  IVPB 1000 milliGRAM(s) IV Intermittent every 12 hours  norepinephrine Infusion 0.05 MICROgram(s)/kG/Min (5.18 mL/Hr) IV Continuous <Continuous>  pantoprazole  Injectable 40 milliGRAM(s) IV Push two times a day    MEDICATIONS  (PRN):  acetaminophen   Oral Liquid .. 650 milliGRAM(s) Enteral Tube every 6 hours PRN Temp greater or equal to 38C (100.4F)      cxr noted

## 2023-10-04 LAB
ALBUMIN SERPL ELPH-MCNC: 2.3 G/DL — LOW (ref 3.5–5.2)
ALP SERPL-CCNC: 144 U/L — HIGH (ref 30–115)
ALT FLD-CCNC: 27 U/L — SIGNIFICANT CHANGE UP (ref 0–41)
ANION GAP SERPL CALC-SCNC: 7 MMOL/L — SIGNIFICANT CHANGE UP (ref 7–14)
AST SERPL-CCNC: 25 U/L — SIGNIFICANT CHANGE UP (ref 0–41)
BASE EXCESS BLDA CALC-SCNC: 10.1 MMOL/L — HIGH (ref -2–3)
BASE EXCESS BLDA CALC-SCNC: 8 MMOL/L — HIGH (ref -2–3)
BASE EXCESS BLDA CALC-SCNC: 8.8 MMOL/L — HIGH (ref -2–3)
BASE EXCESS BLDA CALC-SCNC: 9.4 MMOL/L — HIGH (ref -2–3)
BASOPHILS # BLD AUTO: 0.04 K/UL — SIGNIFICANT CHANGE UP (ref 0–0.2)
BASOPHILS NFR BLD AUTO: 0.6 % — SIGNIFICANT CHANGE UP (ref 0–1)
BILIRUB SERPL-MCNC: 0.2 MG/DL — SIGNIFICANT CHANGE UP (ref 0.2–1.2)
BUN SERPL-MCNC: 6 MG/DL — LOW (ref 10–20)
CALCIUM SERPL-MCNC: 7.6 MG/DL — LOW (ref 8.4–10.4)
CHLORIDE SERPL-SCNC: 99 MMOL/L — SIGNIFICANT CHANGE UP (ref 98–110)
CO2 SERPL-SCNC: 30 MMOL/L — SIGNIFICANT CHANGE UP (ref 17–32)
CREAT SERPL-MCNC: 0.6 MG/DL — LOW (ref 0.7–1.5)
CULTURE RESULTS: SIGNIFICANT CHANGE UP
CULTURE RESULTS: SIGNIFICANT CHANGE UP
EGFR: 105 ML/MIN/1.73M2 — SIGNIFICANT CHANGE UP
EOSINOPHIL # BLD AUTO: 0.16 K/UL — SIGNIFICANT CHANGE UP (ref 0–0.7)
EOSINOPHIL NFR BLD AUTO: 2.3 % — SIGNIFICANT CHANGE UP (ref 0–8)
GLUCOSE SERPL-MCNC: 174 MG/DL — HIGH (ref 70–99)
GRAM STN FLD: SIGNIFICANT CHANGE UP
HCO3 BLDA-SCNC: 32 MMOL/L — HIGH (ref 21–28)
HCO3 BLDA-SCNC: 34 MMOL/L — HIGH (ref 21–28)
HCO3 BLDA-SCNC: 35 MMOL/L — HIGH (ref 21–28)
HCO3 BLDA-SCNC: 36 MMOL/L — HIGH (ref 21–28)
HCT VFR BLD CALC: 31.5 % — LOW (ref 37–47)
HGB BLD-MCNC: 10 G/DL — LOW (ref 12–16)
HOROWITZ INDEX BLDA+IHG-RTO: 40 — SIGNIFICANT CHANGE UP
HOROWITZ INDEX BLDA+IHG-RTO: 50 — SIGNIFICANT CHANGE UP
IMM GRANULOCYTES NFR BLD AUTO: 0.7 % — HIGH (ref 0.1–0.3)
LYMPHOCYTES # BLD AUTO: 1 K/UL — LOW (ref 1.2–3.4)
LYMPHOCYTES # BLD AUTO: 14.1 % — LOW (ref 20.5–51.1)
MAGNESIUM SERPL-MCNC: 1.9 MG/DL — SIGNIFICANT CHANGE UP (ref 1.8–2.4)
MCHC RBC-ENTMCNC: 23.3 PG — LOW (ref 27–31)
MCHC RBC-ENTMCNC: 31.7 G/DL — LOW (ref 32–37)
MCV RBC AUTO: 73.4 FL — LOW (ref 81–99)
MONOCYTES # BLD AUTO: 0.62 K/UL — HIGH (ref 0.1–0.6)
MONOCYTES NFR BLD AUTO: 8.8 % — SIGNIFICANT CHANGE UP (ref 1.7–9.3)
NEUTROPHILS # BLD AUTO: 5.2 K/UL — SIGNIFICANT CHANGE UP (ref 1.4–6.5)
NEUTROPHILS NFR BLD AUTO: 73.5 % — SIGNIFICANT CHANGE UP (ref 42.2–75.2)
NRBC # BLD: 0 /100 WBCS — SIGNIFICANT CHANGE UP (ref 0–0)
PCO2 BLDA: 41 MMHG — SIGNIFICANT CHANGE UP (ref 25–48)
PCO2 BLDA: 48 MMHG — SIGNIFICANT CHANGE UP (ref 25–48)
PCO2 BLDA: 49 MMHG — HIGH (ref 25–48)
PCO2 BLDA: 50 MMHG — HIGH (ref 25–48)
PH BLDA: 7.46 — HIGH (ref 7.35–7.45)
PH BLDA: 7.5 — HIGH (ref 7.35–7.45)
PLATELET # BLD AUTO: 207 K/UL — SIGNIFICANT CHANGE UP (ref 130–400)
PMV BLD: 9.8 FL — SIGNIFICANT CHANGE UP (ref 7.4–10.4)
PO2 BLDA: 103 MMHG — SIGNIFICANT CHANGE UP (ref 83–108)
PO2 BLDA: 114 MMHG — HIGH (ref 83–108)
PO2 BLDA: 86 MMHG — SIGNIFICANT CHANGE UP (ref 83–108)
PO2 BLDA: 87 MMHG — SIGNIFICANT CHANGE UP (ref 83–108)
POTASSIUM SERPL-MCNC: 3.6 MMOL/L — SIGNIFICANT CHANGE UP (ref 3.5–5)
POTASSIUM SERPL-SCNC: 3.6 MMOL/L — SIGNIFICANT CHANGE UP (ref 3.5–5)
PROT SERPL-MCNC: 5 G/DL — LOW (ref 6–8)
RBC # BLD: 4.29 M/UL — SIGNIFICANT CHANGE UP (ref 4.2–5.4)
RBC # FLD: 20.3 % — HIGH (ref 11.5–14.5)
SAO2 % BLDA: 97.5 % — SIGNIFICANT CHANGE UP (ref 94–98)
SAO2 % BLDA: 97.9 % — SIGNIFICANT CHANGE UP (ref 94–98)
SAO2 % BLDA: 99 % — HIGH (ref 94–98)
SAO2 % BLDA: 99.3 % — HIGH (ref 94–98)
SODIUM SERPL-SCNC: 136 MMOL/L — SIGNIFICANT CHANGE UP (ref 135–146)
SPECIMEN SOURCE: SIGNIFICANT CHANGE UP
WBC # BLD: 7.07 K/UL — SIGNIFICANT CHANGE UP (ref 4.8–10.8)
WBC # FLD AUTO: 7.07 K/UL — SIGNIFICANT CHANGE UP (ref 4.8–10.8)

## 2023-10-04 PROCEDURE — 31624 DX BRONCHOSCOPE/LAVAGE: CPT

## 2023-10-04 PROCEDURE — 71045 X-RAY EXAM CHEST 1 VIEW: CPT | Mod: 26

## 2023-10-04 PROCEDURE — 99233 SBSQ HOSP IP/OBS HIGH 50: CPT

## 2023-10-04 PROCEDURE — 74018 RADEX ABDOMEN 1 VIEW: CPT | Mod: 26

## 2023-10-04 PROCEDURE — 99291 CRITICAL CARE FIRST HOUR: CPT | Mod: 25

## 2023-10-04 RX ORDER — SENNA PLUS 8.6 MG/1
2 TABLET ORAL AT BEDTIME
Refills: 0 | Status: DISCONTINUED | OUTPATIENT
Start: 2023-10-04 | End: 2023-10-06

## 2023-10-04 RX ORDER — MIDODRINE HYDROCHLORIDE 2.5 MG/1
10 TABLET ORAL EVERY 8 HOURS
Refills: 0 | Status: DISCONTINUED | OUTPATIENT
Start: 2023-10-04 | End: 2023-10-06

## 2023-10-04 RX ORDER — POTASSIUM CHLORIDE 20 MEQ
20 PACKET (EA) ORAL ONCE
Refills: 0 | Status: COMPLETED | OUTPATIENT
Start: 2023-10-04 | End: 2023-10-04

## 2023-10-04 RX ADMIN — MEROPENEM 100 MILLIGRAM(S): 1 INJECTION INTRAVENOUS at 05:21

## 2023-10-04 RX ADMIN — HEPARIN SODIUM 5000 UNIT(S): 5000 INJECTION INTRAVENOUS; SUBCUTANEOUS at 18:02

## 2023-10-04 RX ADMIN — PANTOPRAZOLE SODIUM 40 MILLIGRAM(S): 20 TABLET, DELAYED RELEASE ORAL at 05:21

## 2023-10-04 RX ADMIN — HEPARIN SODIUM 5000 UNIT(S): 5000 INJECTION INTRAVENOUS; SUBCUTANEOUS at 05:20

## 2023-10-04 RX ADMIN — POLYETHYLENE GLYCOL 3350 17 GRAM(S): 17 POWDER, FOR SOLUTION ORAL at 05:21

## 2023-10-04 RX ADMIN — MEROPENEM 100 MILLIGRAM(S): 1 INJECTION INTRAVENOUS at 14:49

## 2023-10-04 RX ADMIN — Medication 20 MILLIEQUIVALENT(S): at 12:21

## 2023-10-04 RX ADMIN — MEROPENEM 100 MILLIGRAM(S): 1 INJECTION INTRAVENOUS at 22:00

## 2023-10-04 RX ADMIN — GABAPENTIN 600 MILLIGRAM(S): 400 CAPSULE ORAL at 12:20

## 2023-10-04 RX ADMIN — PANTOPRAZOLE SODIUM 40 MILLIGRAM(S): 20 TABLET, DELAYED RELEASE ORAL at 18:02

## 2023-10-04 RX ADMIN — CHLORHEXIDINE GLUCONATE 1 APPLICATION(S): 213 SOLUTION TOPICAL at 05:20

## 2023-10-04 RX ADMIN — CHLORHEXIDINE GLUCONATE 15 MILLILITER(S): 213 SOLUTION TOPICAL at 05:21

## 2023-10-04 NOTE — PROGRESS NOTE ADULT - SUBJECTIVE AND OBJECTIVE BOX
24H events:    Patient is a 58y old Female who presents with a chief complaint of Upper GI bleed (03 Oct 2023 06:04)    Primary diagnosis of GI bleed      Today is 5d of hospitalization. This morning patient was seen and examined at bedside. Intubated. On Fentanyl and weaning Levophed drip. Madsen output  cc/hr no BM. No acute or major events overnight.    Code Status: Full Code    Family communication:  Contact date:  Name of person contacted:  Relationship to patient:  Communication details:  What matters most:    PAST MEDICAL & SURGICAL HISTORY  Down syndrome  Osteoporosis  Mild anemia  Neuropathy  S/P debridement  of R hip on 3/2/21      SOCIAL HISTORY:  Social History: unable to obtain      ALLERGIES:  No Known Allergies    MEDICATIONS:  STANDING MEDICATIONS  chlorhexidine 0.12% Liquid 15 milliLiter(s) Oral Mucosa every 12 hours  chlorhexidine 2% Cloths 1 Application(s) Topical <User Schedule>  fentaNYL   Infusion 2 MICROgram(s)/kG/Hr IV Continuous <Continuous>  gabapentin 600 milliGRAM(s) Oral daily  heparin   Injectable 5000 Unit(s) SubCutaneous every 12 hours  levoFLOXacin IVPB 750 milliGRAM(s) IV Intermittent every 48 hours  meropenem  IVPB 1000 milliGRAM(s) IV Intermittent every 12 hours  norepinephrine Infusion 0.05 MICROgram(s)/kG/Min IV Continuous <Continuous>  pantoprazole  Injectable 40 milliGRAM(s) IV Push two times a day    PRN MEDICATIONS  acetaminophen   Oral Liquid .. 650 milliGRAM(s) Enteral Tube every 6 hours PRN    VITALS:   T(F): 98.9  HR: 76  BP: 109/71  RR: 32  SpO2: 95%    PHYSICAL EXAM:  GENERAL:   ( x) NAD, lying in bed comfortably     (  ) obtunded     (  ) lethargic     (  ) somnolent    HEAD:   ( x) Atraumatic     (  ) hematoma     (  ) laceration (specify location:       )     NECK:  (x) Supple     (  ) neck stiffness     (  ) nuchal rigidity     (  )  no JVD     (  ) JVD present ( -- cm)    HEART:  Rate -->     (x) normal rate     (  ) bradycardic     (  ) tachycardic  Rhythm -->     (x) regular     (  ) regularly irregular     (  ) irregularly irregular  Murmurs -->     (x) normal s1s2     (  ) systolic murmur     (  ) diastolic murmur     (  ) continuous murmur      (  ) S3 present     (  ) S4 present    LUNGS:   ( x)Unlabored respirations     (  ) tachypnea  ( x) B/L air entry     (  ) decreased breath sounds in:  (location     )    ( ) no adventitious sound     (  ) crackles     (  ) wheezing      ( x ) rhonchi      (specify location: bilaterally      )  (  ) chest wall tenderness (specify location:       )    ABDOMEN:   ( x) Soft     (  ) tense   |   (X  ) nondistended     (  ) distended   |   ( X ) +BS     (  ) hypoactive bowel sounds     (  ) hyperactive bowel sounds  ( x) nontender     (  ) RUQ tenderness     (  ) RLQ tenderness     (  ) LLQ tenderness     (  ) epigastric tenderness     (  ) diffuse tenderness  (  ) Splenomegaly      (  ) Hepatomegaly      (  ) Jaundice     (  ) ecchymosis     EXTREMITIES:  ( ) Normal     (  ) Rash     (  ) ecchymosis     (  ) varicose veins      (  ) pitting edema     ( x ) non-pitting edema   (  ) ulceration     (  ) gangrene:     (location: ;left hand wound      )    NERVOUS SYSTEM:  sedated  ( ) A&Ox3     (  ) confused     (  ) lethargic  CN II-XII:     (  ) Intact     (  ) deficits found     (Specify:     )   Upper extremities:     (  ) no sensorimotor deficits     (  ) weakness     (  ) loss of proprioception/vibration     (  ) loss of touch/temperature (specify:    )  Lower extremities:     (  ) no sensorimotor deficits     (  ) weakness     (  ) loss of proprioception/vibration     (  ) loss of touch/temperature (specify:    )    SKIN:   (  ) No rashes or lesions     (  ) maculopapular rash     (  ) pustules     (  ) vesicles     (  ) ulcer     (  ) ecchymosis     (specify location: left hand wound, right plantar wound    )    AMPAC score :    (  ) Indwelling Madsen Catheter:   Date inserted:    Reason (  ) Critical illness     (  ) urinary retention    (  ) Accurate Ins/Outs Monitoring     (  ) CMO patient    (  ) Central Line:   Date inserted:  Location: (  ) Right IJ     (  ) Left IJ     (  ) Right Fem     (  ) Left Fem    (  ) SPC        (  ) pigtail       (  ) PEG tube       (  ) colostomy       (  ) jejunostomy  (  ) U-Dall    LABS:                        9.2    5.58  )-----------( 176      ( 03 Oct 2023 04:16 )             29.4     10-03    137  |  102  |  7<L>  ----------------------------<  83  3.5   |  26  |  0.5<L>    Ca    7.5<L>      03 Oct 2023 04:16  Mg     1.8     10-03    TPro  4.5<L>  /  Alb  2.0<L>  /  TBili  0.2  /  DBili  x   /  AST  26  /  ALT  22  /  AlkPhos  122<H>  10-03      Urinalysis Basic - ( 03 Oct 2023 04:16 )    Color: x / Appearance: x / SG: x / pH: x  Gluc: 83 mg/dL / Ketone: x  / Bili: x / Urobili: x   Blood: x / Protein: x / Nitrite: x   Leuk Esterase: x / RBC: x / WBC x   Sq Epi: x / Non Sq Epi: x / Bacteria: x      ABG - ( 03 Oct 2023 02:49 )  pH, Arterial: 7.40  pH, Blood: x     /  pCO2: 48    /  pO2: 86    / HCO3: 30    / Base Excess: 4.0   /  SaO2: 98.2          Culture - Sputum (collected 02 Oct 2023 11:10)  Source: Trach Asp Tracheal Aspirate  Gram Stain (03 Oct 2023 07:33):    Moderate polymorphonuclear leukocytes per low power field    Rare Squamous epithelial cells per low power field    Few Gram positive cocci in pairs seen per oil power field    Rare Gram Positive Rods seen per oil power field    Rare Yeast like cells seen per oil power field      RADIOLOGY:      ACC: [id number] EXAM:  CT ABDOMEN AND PELVIS OC IC   ORDERED BY: CONCHA NOLASCO     PROCEDURE DATE:  10/01/2023          INTERPRETATION:  CLINICAL HISTORY: History of GI bleeding. Question of   duodenal perforation..    TECHNIQUE: Contiguous axial CTimages were obtained from the lower chest   to the pubic symphysis following administration of 95 cc Omnipaque 350   intravenous contrast. Oral contrast was administered. Reformatted images   in the coronal and sagittal planes were acquired.    COMPARISON: CT dated 9/29/2023.      FINDINGS:    Study limited by streak artifact secondary to patient arm positioning.    LOWER CHEST: Extensive patchy bibasilar opacities/consolidations   redemonstrated. Bilateral pleural effusions partially imaged.    HEPATOBILIARY: Unchanged.    SPLEEN: Unchanged    PANCREAS: Unchanged    ADRENAL GLANDS: Unchanged    KIDNEYS: No hydronephrosis.    ABDOMINOPELVIC NODES: Unchanged    PELVIC ORGANS: Interval removal of Madsen catheter.    PERITONEUM/MESENTERY/BOWEL: Previous seen foci of retroperitoneal gas no   longer identified. New cecal wall thickening, compatible with cecitis.   Small ascites layering in the pelvis. No evidence of bowel obstruction.   Feeding tube tip in stomach.    BONES/SOFT TISSUES: Thickening/infiltrative changes of the soft tissues   of the right lateral thigh (4/70). Unchanged osseous structures.      IMPRESSION:    1.  Extensive patchy bibasilar opacities/consolidations redemonstrated.   Bilateral pleural effusions partially imaged.  2.  Previous seen foci of retroperitoneal gas no longer identified.  3.  New cecal wall thickening, compatible with cecitis.  4.  Small ascites layering in the pelvis.  5.  Thickening/infiltrative changes of the soft tissues of the right   lateral thigh. Correlate with exam.    --- End of Report ---    < from: CT Chest No Cont (10.01.23 @ 20:17) >  INTERPRETATION:  Reason for Exam: Aspiration.    Technique: CT of the chest was performed from the thoracic inlet to the   level of the adrenal glandswithout contrast injection. Coronal and   sagittal images have been submitted.    Comparison CT: August 4, 2023.    Findings:    Lungs, Pleura, and Airways: Endotracheal tube in place. New small   bilateral pleural effusions and increased patchy bilateral consolidated   opacities, predominantly involving the bilateral lower lobes and to a   lesser extent involving bilateral upper lobes and right middle lobe,   suspicious for pneumonia, possibly related to prior aspiration event.   Central tracheobronchial tree is grossly patent. No pneumothorax.    Mediastinum/Lymph Nodes:Enteric tube is noted within the esophagus, with   abundant contrast material noted within the mid to lower esophagus. No   enlarged thoracic lymph nodes.    Heart/Great Vessels: Heart size is within normal limits. Thoracic aorta   and pulmonary arteries are normal caliber. Trace pericardial fluid.    Bones and soft tissues: No acute osseous abnormality.    IMPRESSION:    1. Since August 4, 2023, increased patchy bilateral consolidated   opacities, predominantly involving bilateral lower lobes and to a lesser   extent involving bilateral upper and right middle lobes, suspicious for   pneumonia, possibly related to prior aspiration event; abundant contrast   material is noted within the mid to lower esophagus, likely reflecting   reflux subsequent to prior contrast administration.  2. New small bilateral pleural effusions.      Please see separately dictated report of concurrently performed   abdominopelvic CT forreport of intra-abdominal findings.    --- End of Report ---            STEVIE IRAHETA MD; Attending Radiologist  This document has been electronically signed. Oct  2 2023  8:50AM    < end of copied text >  < from: [facility] Duplex Upper Ext Vein Scan, Left (10.01.23 @ 13:46) >  ACC: [id number] EXAM:  DUPLEX EXT VEINS UPPER LT   ORDERED BY: GREGORY GUTIERRES     PROCEDURE DATE:  10/01/2023          INTERPRETATION:  CLINICAL INFORMATION: 58 years old female with left arm   swelling    TECHNIQUE: Duplex sonography of the LEFT UPPER extremity veins with color   and spectral Doppler, with and without compression.    FINDINGS:    The left internal jugular, subclavian, axillary and brachial veins are   patent and compressible where applicable.  Basilic vein appears thrombosed.  The cephalic vein (superficial vein) is patent and without thrombus.      IMPRESSION:  No evidence of left upper extremity deep venous thrombosis.    Left basilic vein appears thrombosed.    --- End of Report ---            LIZABETH CONNELL MD; Attending Vascular Surgeon  This document has been electronically signed. Oct  1 2023  8:03PM    < end of copied text >

## 2023-10-04 NOTE — PROGRESS NOTE ADULT - ASSESSMENT
58yFemale with history of of cerebral palsy, down syndrome, severe intellectual disability, nonverbal at baseline, pulmonary stenosis presents with coffee ground emesis. Hospital course complicated by evidence of contained D3 perforation. Palliative care consulted for GOC.    Patient has history of developmental delay and lives in a group home.  Patient has a NOK listed in the chart, but primary team spoke with her, and she has no contact with the patient anymore.  All decisions regarding care goes through CAB at this time.     MEDD (morphine equivalent daily dose):    Education about palliative care provided to patient/family.  See Recs below.    Please call [phone] with questions or concerns 24/7.   We will continue to follow.

## 2023-10-04 NOTE — PROCEDURAL SAFETY CHECKLIST WITH OR WITHOUT SEDATION - NSINSTRUMENTCOUNTSD_GEN_ALL_CORE
done
vomited  x 1 yellow content after breakfast, no further vomiting, appears comfortable, pain free, abdomen nontender  during  palpation
done

## 2023-10-04 NOTE — PROGRESS NOTE ADULT - ASSESSMENT
IMPRESSION:    Acute hypoxemic respiratory failure  on 40%  Suspected upper GI bleed s/p 2 units pRBC  Possible duodenal perforation sp surgical/ GI eval no internention  aspiration pneumonia  polymicrobial bacteremia   NSTEMI likely type II   NELA likely prerenal resolved  H/o CP  H/o seizures    PLAN:    CNS: SAT.  Assess MS     HEENT: Oral and ET tube care    PULMONARY: HOB at 45 degrees.  Vent changes  .  FiO2 40.  FU DTA.  monitor PPL and DP.  bronch today, SBT    CARDIOVASCULAR: Wean levophed.   ECHO noted.      GI: feeding Protonix BID.  Surgery follow up.  GI follow up.     RENAL:  Voiding trial Bladder scans.  FU lytes.  Correct as needed.      INFECTIOUS DISEASE:  abs per ID    HEMATOLOGICAL: DVT prophylaxis seq.  Monitor CBC.      ENDOCRINE:  Follow up FS.  Insulin protocol if needed.    MUSCULOSKELETAL: Bedrest.  Off loading.      Prognosis guarded.      DISPO: ICU    multiple skin ulcers  palliative care fup IMPRESSION:    Acute hypoxemic respiratory failure  on 40%  Suspected upper GI bleed s/p 2 units pRBC  Possible duodenal perforation sp surgical/ GI eval no intervention  aspiration pneumonia  polymicrobial bacteremia   NSTEMI likely type II   NELA likely prerenal resolved  H/o CP  H/o seizures    PLAN:    CNS: SAT.  Assess MS     HEENT: Oral and ET tube care    PULMONARY: HOB at 45 degrees.  Vent changes  .  FiO2 40.  FU DTA.  monitor PPL and DP.  bronch today, SBT    CARDIOVASCULAR: Wean levophed.   ECHO noted.      GI: feeding Protonix BID.  Surgery follow up.  GI follow up.     RENAL:  Voiding trial Bladder scans.  FU lytes.  Correct as needed.      INFECTIOUS DISEASE:  abs per ID    HEMATOLOGICAL: DVT prophylaxis seq.  Monitor CBC.      ENDOCRINE:  Follow up FS.  Insulin protocol if needed.    MUSCULOSKELETAL: Bedrest.  Off loading.      Prognosis guarded.      DISPO: ICU  barlow for retention  multiple skin ulcers  palliative care fup

## 2023-10-04 NOTE — CHART NOTE - NSCHARTNOTEFT_GEN_A_CORE
called and left  for [facility] [phone] called and left  for CAB Rep Amy Rich [phone]. Per CAB amy santiago not in office today.

## 2023-10-04 NOTE — PROCEDURE NOTE - NSBRONCHPROCDETAILS_GEN_A_CORE_FT
PREOPERATIVE DIAGNOSIS: PNA    POSTOPERATIVE DIAGNOSES: PNA      PROCEDURE PERFORMED:  Bronchoscopy, and washing.    CONSENT: After explanining the risk and benefit the phone consent was obtained from Consumer advisory board  Amy and witnessed by resident Dr Booth    The patient had been previously intubated and was on mechanical ventilatory support. He was on sedation, which was continued and adjusted during the procedure. His FiO2 was increased to 100% during the procedure. The  fiberoptic bronchoscope was introduced through an endotracheal tube adaptor and the tip of the endotracheal tube was noted to be in good position above the jackie.   The Right tracheobronchial tree was inspected closely to the level of the subsegmental bronchi. All bronchi are patent with no endobronchial lesions and no mucosal lesions noted.   The Left tracheobronchial tree was also patent and intact with the mucosa normal   The procedure was completed and all samples were submitted for appropriate studies  After adequate clearing of secretions was accomplished, the bronchoscope was removed from the patient and the procedure was terminated.   The patient tolerated the procedure well and there were no complications.

## 2023-10-04 NOTE — PROGRESS NOTE ADULT - SUBJECTIVE AND OBJECTIVE BOX
HPI:  58 year old female with a PMHx of Down syndrome, nonverbal at baseline, hypothyroidism, cerebral palsy, congenital pulmonary stenosis presents to the ED for hemoptysis.    Interval history  -Patient seen at bedside  -She remains intubated and sedated     ADVANCE DIRECTIVES:     [x ] Full Code [ ] DNR  MOLST  [ ]  Living Will  [ ]   DECISION MAKER(s):  [ ] Health Care Proxy(s)  [x ] Surrogate(s)  [x ] Guardian           Name(s): Phone Number(s):  [facility]      BASELINE (I)ADL(s) (prior to admission):    [place]: [ ]Total  [ ] Moderate [ ]Dependent  Palliative Performance Status Version 2:         %    http://npcrc.org/files/news/palliative_performance_scale_ppsv2.pdf    Allergies    No Known Allergies    Intolerances    MEDICATIONS  (STANDING):  chlorhexidine 2% Cloths 1 Application(s) Topical <User Schedule>  fentaNYL   Infusion 2 MICROgram(s)/kG/Hr (11.1 mL/Hr) IV Continuous <Continuous>  gabapentin 600 milliGRAM(s) Oral daily  heparin   Injectable 5000 Unit(s) SubCutaneous every 12 hours  levoFLOXacin IVPB 750 milliGRAM(s) IV Intermittent every 24 hours  meropenem  IVPB 1000 milliGRAM(s) IV Intermittent every 8 hours  midodrine 10 milliGRAM(s) Oral every 8 hours  norepinephrine Infusion 0.05 MICROgram(s)/kG/Min (5.18 mL/Hr) IV Continuous <Continuous>  pantoprazole  Injectable 40 milliGRAM(s) IV Push two times a day  polyethylene glycol 3350 17 Gram(s) Oral two times a day  senna 2 Tablet(s) Oral at bedtime    MEDICATIONS  (PRN):  acetaminophen   Oral Liquid .. 650 milliGRAM(s) Enteral Tube every 6 hours PRN Temp greater or equal to 38C (100.4F)        PRESENT SYMPTOMS: [x ]Unable to obtain due to poor mentation   Source if other than patient:  [ ]Family   [ ]Team     Pain: [ ]yes [ ]no  QOL impact -   Location -                    Aggravating factors -  Quality -  Radiation -  Timing-  Severity (0-10 scale):  Minimal acceptable level (0-10 scale):     CPOT:  0  https://[email or web address]/[email or web address]/Critical-Care-Pain-Observation-Tool-(CPOT)    PAIN AD Score:   [email or web address]/painad.pdf (press ctrl +  left click to view)    Dyspnea:                           [ ]None[ ]Mild [ ]Moderate [ ]Severe     Respiratory Distress Observation Scale (RDOS):   A score of 0 to 2 signifies little or no respiratory distress, 3 signifies mild distress, scores 4 to 6 indicate moderate distress, and scores greater than 7 signify severe distress  [email or web address]/sites/default/files/[email or web address]-epsbhgqorod-byakdato-zqjihvyyymt-imczs.pdf    Anxiety:                             [ ]None[ ]Mild [ ]Moderate [ ]Severe   Fatigue:                             [ ]None[ ]Mild [ ]Moderate [ ]Severe   Nausea:                             [ ]None[ ]Mild [ ]Moderate [ ]Severe   Loss of appetite:              [ ]None[ ]Mild [ ]Moderate [ ]Severe   Constipation:                    [ ]None[ ]Mild [ ]Moderate [ ]Severe    Other Symptoms:  [ ]All other review of systems negative     Palliative Performance Status Version 2:         10%    http://[email or web address]/files/news/palliative_performance_scale_[email or web address].pdf    PHYSICAL EXAM:  Vital Signs Last 24 Hrs  T(C): 36.7 (04 Oct 2023 12:00), Max: 37.4 (04 Oct 2023 00:00)  T(F): 98 (04 Oct 2023 12:00), Max: 99.3 (04 Oct 2023 00:00)  HR: 102 (04 Oct 2023 13:30) (50 - 102)  BP: 111/58 (04 Oct 2023 12:15) (79/41 - 128/61)  BP(mean): 79 (04 Oct 2023 12:15) (57 - 89)  RR: 12 (04 Oct 2023 12:15) (12 - 25)  SpO2: 98% (04 Oct 2023 13:30) (91% - 98%)    Parameters below as of 04 Oct 2023 12:00  Patient On (Oxygen Delivery Method): ventilator    O2 Concentration (%): 40    GENERAL:  [ x] No acute distress [ ]Lethargic  [ x]Unarousable  [ ]Verbal  [x ]Non-Verbal [ ]Cachexia    BEHAVIORAL/PSYCH:  [ ]Alert and Oriented x  [ ] Anxiety [ ] Delirium [ ] Agitation [ x] Calm   EYES: [ ] No scleral icterus [ ] Scleral icterus [x ] Closed  ENMT:  [ ]Dry mouth  [ x]No external oral lesions [ ] No external ear or nose lesions  CARDIOVASCULAR:  [ ]Regular [ ]Irregular [x ]Tachy [ ]Not Tachy  [ ]Raheem [ ] Edema [ ] No edema  PULMONARY:  [ ]Tachypnea  [ ]Audible excessive secretions [x] No labored breathing [ ] labored breathing  GASTROINTESTINAL: [ ]Soft  [ ]Distended  [ ]Not distended [ ]Non tender [ ]Tender  MUSCULOSKELETAL: [ ]No clubbing [ ] clubbing  [x ] No cyanosis [ ] cyanosis  NEUROLOGIC: [ ]No focal deficits  [ ]Follows commands  [x ]Does not follow commands  [ ]Cognitive impairment  [ ]Dysphagia  [ ]Dysarthria  [ ]Paresis   SKIN: [ ] Jaundiced [ ] Non-jaundiced [ ]Rash [x]No Rash [ ] Warm [ ] Dry  MISC/LINES: [ x] ET tube [ ] Trach [ ]NGT/OGT [ ]PEG [ ]Madsen    LABS: reviewed by me                          10.0   7.07  )-----------( 207      ( 04 Oct 2023 04:41 )             31.5       10-04    136  |  99  |  6<L>  ----------------------------<  174<H>  3.6   |  30  |  0.6<L>    Ca    7.6<L>      04 Oct 2023 04:41  Mg     1.9     10-04    TPro  5.0<L>  /  Alb  2.3<L>  /  TBili  0.2  /  DBili  x   /  AST  25  /  ALT  27  /  AlkPhos  144<H>  10-04          ABG - ( 04 Oct 2023 12:35 )  pH, Arterial: 7.46  pH, Blood: x     /  pCO2: 50    /  pO2: 86    / HCO3: 36    / Base Excess: 10.1  /  SaO2: 97.9                Urinalysis Basic - ( 04 Oct 2023 04:41 )    Color: x / Appearance: x / SG: x / pH: x  Gluc: 174 mg/dL / Ketone: x  / Bili: x / Urobili: x   Blood: x / Protein: x / Nitrite: x   Leuk Esterase: x / RBC: x / WBC x   Sq Epi: x / Non Sq Epi: x / Bacteria: x                  CAPILLARY BLOOD GLUCOSE                  RADIOLOGY & ADDITIONAL STUDIES: reviewed by me    < from: Xray Chest 1 View- PORTABLE-Routine (10.04.23 @ 06:06) >  Impression:    Bilateral opacities and effusions similar to prior    < end of copied text >      EKG: reviewed by me    < from: 12 Lead ECG (09.29.23 @ 10:17) >  Ventricular Rate 103 BPM    Atrial Rate 103 BPM    P-R Interval 128 ms    QRS Duration 66 ms    Q-T Interval 322 ms    QTC Calculation(Bazett) 421 ms    P Axis 69 degrees    R Axis 71 degrees    T Axis 73 degrees    Diagnosis Line Sinus tachycardia  ST elevation, consider early repolarization  Borderline ECG    < end of copied text >      PROTEIN CALORIE MALNUTRITION PRESENT: [ ]mild [ ]moderate [ ]severe [ ]underweight [ ]morbid obesity  https://www.andeal.org/vault/2440/web/files/ONC/Table_Clinical%20Characteristics%20to%20Document%20Malnutrition-White%20JV%20et%20al%318796.pdf    Height (cm): 142.2 (08-12-23 @ 00:41), 142.2 (08-04-23 @ 09:15)  Weight (kg): 52.1 (08-15-23 @ 00:00), 74.4 (08-11-23 @ 21:15)  BMI (kg/m2): 25.8 (08-15-23 @ 00:00), 36.8 (08-12-23 @ 00:41), 36.8 (08-11-23 @ 21:15)  [ ]PPSV2 < or = to 30% [ ]significant weight loss  [ ]poor nutritional intake  [ ]anasarca      [ ]Artificial Nutrition      Palliative Care Spiritual/Emotional Screening Tool Question  Severity (0-4):                    OR                    [ x] Unable to determine. Will assess at later time if appropriate.  Score of 2 or greater indicates recommendation of Chaplaincy and/or SW referral  Chaplaincy Referral: [ ] Yes [ ] Refused [ ] Following     Caregiver [place]:  [ ] Yes [ ] No    OR    [x ] Unable to determine. Will assess at later time if appropriate.  Social Work Referral [ ]  Patient and Family Centered Care Referral [ ]    Anticipatory Grief Present: [ ] Yes [ ] No    OR     [ x] Unable to determine. Will assess at later time if appropriate.  Social Work Referral [ ]  Patient and Family Centered Care Referral [ ]    Patient discussed with primary medical team MD  Palliative care education provided to patient and/or family

## 2023-10-04 NOTE — PROGRESS NOTE ADULT - ASSESSMENT
ASSESSMENT & PLAN    TEA ECHAVARRIA is a 58F with PMH of Down syndrome, cerebral palsy, severe intellectual disability, nonverbal at baseline, hypothyroidism, congenital pulmonary stenosis, chronic pressure ulcers, orthostatic hypotension, constipation, presents to the ED for coffee ground emesis and AMS. Patient was recently admitted to the hospital for management of pneumonia and resultant hypoxic respiratory failure. Patient admitted to MICU for presumptive upper GI bleed. Surgery consulted for CT findings of duodenal perforation.    #Hypovolemic and septic shock  #Acute upper GI bleed s/p 2 units pRBC  #Possible perforation sp surgical evaluation  - Hb 9.9, s/p 2u pRBC, Hb 14.7 > 12 -> 10.1-> 9.5 -> 9.2  - Surgery: no plans for any surgical intervention. Pt is tolerated feeds  - CTAP w/ oral and IV contrast: No contrast extravasation, Previous seen foci of retroperitoneal gas no longer identified.  - Started Minodrine 10 mg q8 to try and wean levophed if possible  - Lactate trend 2.4 -> 2.00 -> 1.5, 1.3 -> 1.0   - CBC downtrending to 5.5 k   - c/w protonix BID for at least 8 weeks and then once daily   - Miralax BID with senna to have atleast 1-2 BMs per day    #Pneumonia, in the setting of recent hospitalization  #Likely aspiration  #Acute hypoxemic respiratory failure   - CXR b/l opacities  - Bronchoscopy today 10/4  - SBT after bronchoscopy   - ID recs appreciated   - On IV meropenem 1 g q12 and IV Levaquin 750 mg q48h  - F/u Sputum Cx, NG on 10/2  - MRSA negative  - wean down FiO2 for a saturation above 92%  - c/w fentanyl, keep sedated for now    #NELA likely prerenal, resolved  - Cr 1.6 > 1.2 > 1.1  - voiding trial  - Madsen placement today 10/3, output 60 -100 cc/hr    #NSTEMI likely type II  - EKG Sinus tachycardia, ST elevation, consider early repolarization  - Echo results 9/30 EF 55 to 60 %, Normal left systolic function Diastolic function could not be assessed.   - Trend trop: 0.34 -> 0.11     #Left hand Edema   - Burn team wound care recs appreciated   - Wound care: Please wash wound with soap and water, apply bacitracin, cover xeroform, wrap with cling twice a day.     - DVT ppx: Heparin  - Diet: NPO with tube feed 20 mL per hour  - Activity: Bedrest      #Handoff:  -Bronchoscopy today  - Monitor for BM   - KUB results

## 2023-10-04 NOTE — PROGRESS NOTE ADULT - SUBJECTIVE AND OBJECTIVE BOX
Over Night Events: events noted, remain critically ill, still intubated, ventilated, ID/ GI/ palliative noted, low grade fever    PHYSICAL EXAM    ICU Vital Signs Last 24 Hrs  T(C): 37.3 (04 Oct 2023 04:00), Max: 37.4 (04 Oct 2023 00:00)  T(F): 99.2 (04 Oct 2023 04:00), Max: 99.3 (04 Oct 2023 00:00)  HR: 54 (04 Oct 2023 05:00) (51 - 88)  BP: 108/55 (04 Oct 2023 05:00) (79/41 - 128/61)  BP(mean): 79 (04 Oct 2023 05:00) (57 - 89)  RR: 16 (04 Oct 2023 05:00) (15 - 32)  SpO2: 95% (04 Oct 2023 05:00) (91% - 98%)    O2 Parameters below as of 04 Oct 2023 05:00  Patient On (Oxygen Delivery Method): ventilator    O2 Concentration (%): 40        General: ill looking  ETT  Lungs: Bilateral Rhonchi  Cardiovascular: HARPREET 2.6  Abdomen: Soft, Positive BS  Extremities: No clubbing   Contracted       10-02-23 @ 07:01  -  10-03-23 @ 07:00  --------------------------------------------------------  IN:    FentaNYL: 99.9 mL    FentaNYL: 144.3 mL    IV PiggyBack: 150 mL    Jevity 1.2: 150 mL    Lactated Ringers: 1150 mL    Lactated Ringers Bolus: 1500 mL    Norepinephrine: 85 mL  Total IN: 3279.2 mL    OUT:    Intermittent Catheterization - Urethral (mL): 1550 mL  Total OUT: 1550 mL    Total NET: 1729.2 mL      10-03-23 @ 07:01  -  10-04-23 @ 06:13  --------------------------------------------------------  IN:    FentaNYL: 255.3 mL    IV PiggyBack: 250 mL    Jevity 1.2: 420 mL    Norepinephrine: 143.2 mL  Total IN: 1068.5 mL    OUT:    Indwelling Catheter - Urethral (mL): 1725 mL  Total OUT: 1725 mL    Total NET: -656.5 mL          LABS:                          10.0   7.07  )-----------( 207      ( 04 Oct 2023 04:41 )             31.5                                               10-04    136  |  99  |  6<L>  ----------------------------<  174<H>  3.6   |  30  |  0.6<L>    Ca    7.6<L>      04 Oct 2023 04:41  Mg     1.9     10-04    TPro  5.0<L>  /  Alb  2.3<L>  /  TBili  0.2  /  DBili  x   /  AST  25  /  ALT  27  /  AlkPhos  144<H>  10-04                                             Urinalysis Basic - ( 04 Oct 2023 04:41 )    Color: x / Appearance: x / SG: x / pH: x  Gluc: 174 mg/dL / Ketone: x  / Bili: x / Urobili: x   Blood: x / Protein: x / Nitrite: x   Leuk Esterase: x / RBC: x / WBC x   Sq Epi: x / Non Sq Epi: x / Bacteria: x                                                  LIVER FUNCTIONS - ( 04 Oct 2023 04:41 )  Alb: 2.3 g/dL / Pro: 5.0 g/dL / ALK PHOS: 144 U/L / ALT: 27 U/L / AST: 25 U/L / GGT: x                                                  Culture - Sputum (collected 02 Oct 2023 11:10)  Source: Trach Asp Tracheal Aspirate  Gram Stain (03 Oct 2023 07:33):    Moderate polymorphonuclear leukocytes per low power field    Rare Squamous epithelial cells per low power field    Few Gram positive cocci in pairs seen per oil power field    Rare Gram Positive Rods seen per oil power field    Rare Yeast like cells seen per oil power field  Preliminary Report (03 Oct 2023 18:44):    No growth to date.                                                   Mode: AC/ CMV (Assist Control/ Continuous Mandatory Ventilation)  RR (machine): 16  TV (machine): 300  FiO2: 40  PEEP: 8  ITime: 0.9  MAP: 12  PIP: 24                                      ABG - ( 04 Oct 2023 03:33 )  pH, Arterial: 7.50  pH, Blood: x     /  pCO2: 41    /  pO2: 103   / HCO3: 32    / Base Excess: 8.0   /  SaO2: 99.3                MEDICATIONS  (STANDING):  chlorhexidine 0.12% Liquid 15 milliLiter(s) Oral Mucosa every 12 hours  chlorhexidine 2% Cloths 1 Application(s) Topical <User Schedule>  fentaNYL   Infusion 2 MICROgram(s)/kG/Hr (11.1 mL/Hr) IV Continuous <Continuous>  gabapentin 600 milliGRAM(s) Oral daily  heparin   Injectable 5000 Unit(s) SubCutaneous every 12 hours  levoFLOXacin IVPB 750 milliGRAM(s) IV Intermittent every 24 hours  meropenem  IVPB 1000 milliGRAM(s) IV Intermittent every 8 hours  norepinephrine Infusion 0.05 MICROgram(s)/kG/Min (5.18 mL/Hr) IV Continuous <Continuous>  pantoprazole  Injectable 40 milliGRAM(s) IV Push two times a day  polyethylene glycol 3350 17 Gram(s) Oral two times a day    MEDICATIONS  (PRN):  acetaminophen   Oral Liquid .. 650 milliGRAM(s) Enteral Tube every 6 hours PRN Temp greater or equal to 38C (100.4F)      cxr noted   Over Night Events: events noted, remain critically ill, still intubated, ventilated, ID/ GI/ palliative noted, low grade fever, on levophed 0.09, fentanyl    PHYSICAL EXAM    ICU Vital Signs Last 24 Hrs  T(C): 37.3 (04 Oct 2023 04:00), Max: 37.4 (04 Oct 2023 00:00)  T(F): 99.2 (04 Oct 2023 04:00), Max: 99.3 (04 Oct 2023 00:00)  HR: 54 (04 Oct 2023 05:00) (51 - 88)  BP: 108/55 (04 Oct 2023 05:00) (79/41 - 128/61)  BP(mean): 79 (04 Oct 2023 05:00) (57 - 89)  RR: 16 (04 Oct 2023 05:00) (15 - 32)  SpO2: 95% (04 Oct 2023 05:00) (91% - 98%)    O2 Parameters below as of 04 Oct 2023 05:00  Patient On (Oxygen Delivery Method): ventilator    O2 Concentration (%): 40        General: ill looking  ETT  Lungs: Bilateral Rhonchi  Cardiovascular: HARPREET 2.6  Abdomen: Soft, Positive BS  Extremities: No clubbing   Contracted       10-02-23 @ 07:01  -  10-03-23 @ 07:00  --------------------------------------------------------  IN:    FentaNYL: 99.9 mL    FentaNYL: 144.3 mL    IV PiggyBack: 150 mL    Jevity 1.2: 150 mL    Lactated Ringers: 1150 mL    Lactated Ringers Bolus: 1500 mL    Norepinephrine: 85 mL  Total IN: 3279.2 mL    OUT:    Intermittent Catheterization - Urethral (mL): 1550 mL  Total OUT: 1550 mL    Total NET: 1729.2 mL      10-03-23 @ 07:01  -  10-04-23 @ 06:13  --------------------------------------------------------  IN:    FentaNYL: 255.3 mL    IV PiggyBack: 250 mL    Jevity 1.2: 420 mL    Norepinephrine: 143.2 mL  Total IN: 1068.5 mL    OUT:    Indwelling Catheter - Urethral (mL): 1725 mL  Total OUT: 1725 mL    Total NET: -656.5 mL          LABS:                          10.0   7.07  )-----------( 207      ( 04 Oct 2023 04:41 )             31.5                                               10-04    136  |  99  |  6<L>  ----------------------------<  174<H>  3.6   |  30  |  0.6<L>    Ca    7.6<L>      04 Oct 2023 04:41  Mg     1.9     10-04    TPro  5.0<L>  /  Alb  2.3<L>  /  TBili  0.2  /  DBili  x   /  AST  25  /  ALT  27  /  AlkPhos  144<H>  10-04                                             Urinalysis Basic - ( 04 Oct 2023 04:41 )    Color: x / Appearance: x / SG: x / pH: x  Gluc: 174 mg/dL / Ketone: x  / Bili: x / Urobili: x   Blood: x / Protein: x / Nitrite: x   Leuk Esterase: x / RBC: x / WBC x   Sq Epi: x / Non Sq Epi: x / Bacteria: x                                                  LIVER FUNCTIONS - ( 04 Oct 2023 04:41 )  Alb: 2.3 g/dL / Pro: 5.0 g/dL / ALK PHOS: 144 U/L / ALT: 27 U/L / AST: 25 U/L / GGT: x                                                  Culture - Sputum (collected 02 Oct 2023 11:10)  Source: Trach Asp Tracheal Aspirate  Gram Stain (03 Oct 2023 07:33):    Moderate polymorphonuclear leukocytes per low power field    Rare Squamous epithelial cells per low power field    Few Gram positive cocci in pairs seen per oil power field    Rare Gram Positive Rods seen per oil power field    Rare Yeast like cells seen per oil power field  Preliminary Report (03 Oct 2023 18:44):    No growth to date.                                                   Mode: AC/ CMV (Assist Control/ Continuous Mandatory Ventilation)  RR (machine): 16  TV (machine): 300  FiO2: 40  PEEP: 8  ITime: 0.9  MAP: 12  PIP: 24                                      ABG - ( 04 Oct 2023 03:33 )  pH, Arterial: 7.50  pH, Blood: x     /  pCO2: 41    /  pO2: 103   / HCO3: 32    / Base Excess: 8.0   /  SaO2: 99.3                MEDICATIONS  (STANDING):  chlorhexidine 0.12% Liquid 15 milliLiter(s) Oral Mucosa every 12 hours  chlorhexidine 2% Cloths 1 Application(s) Topical <User Schedule>  fentaNYL   Infusion 2 MICROgram(s)/kG/Hr (11.1 mL/Hr) IV Continuous <Continuous>  gabapentin 600 milliGRAM(s) Oral daily  heparin   Injectable 5000 Unit(s) SubCutaneous every 12 hours  levoFLOXacin IVPB 750 milliGRAM(s) IV Intermittent every 24 hours  meropenem  IVPB 1000 milliGRAM(s) IV Intermittent every 8 hours  norepinephrine Infusion 0.05 MICROgram(s)/kG/Min (5.18 mL/Hr) IV Continuous <Continuous>  pantoprazole  Injectable 40 milliGRAM(s) IV Push two times a day  polyethylene glycol 3350 17 Gram(s) Oral two times a day    MEDICATIONS  (PRN):  acetaminophen   Oral Liquid .. 650 milliGRAM(s) Enteral Tube every 6 hours PRN Temp greater or equal to 38C (100.4F)      cxr noted

## 2023-10-05 LAB
ALBUMIN SERPL ELPH-MCNC: 2.8 G/DL — LOW (ref 3.5–5.2)
ALP SERPL-CCNC: 126 U/L — HIGH (ref 30–115)
ALT FLD-CCNC: 27 U/L — SIGNIFICANT CHANGE UP (ref 0–41)
ANION GAP SERPL CALC-SCNC: 12 MMOL/L — SIGNIFICANT CHANGE UP (ref 7–14)
AST SERPL-CCNC: 32 U/L — SIGNIFICANT CHANGE UP (ref 0–41)
BILIRUB SERPL-MCNC: 0.4 MG/DL — SIGNIFICANT CHANGE UP (ref 0.2–1.2)
BUN SERPL-MCNC: 5 MG/DL — LOW (ref 10–20)
CALCIUM SERPL-MCNC: 8.2 MG/DL — LOW (ref 8.4–10.5)
CHLORIDE SERPL-SCNC: 98 MMOL/L — SIGNIFICANT CHANGE UP (ref 98–110)
CO2 SERPL-SCNC: 27 MMOL/L — SIGNIFICANT CHANGE UP (ref 17–32)
CREAT SERPL-MCNC: 0.6 MG/DL — LOW (ref 0.7–1.5)
CULTURE RESULTS: SIGNIFICANT CHANGE UP
EGFR: 105 ML/MIN/1.73M2 — SIGNIFICANT CHANGE UP
GLUCOSE SERPL-MCNC: 84 MG/DL — SIGNIFICANT CHANGE UP (ref 70–99)
HCT VFR BLD CALC: 34.3 % — LOW (ref 37–47)
HGB BLD-MCNC: 10.8 G/DL — LOW (ref 12–16)
MAGNESIUM SERPL-MCNC: 2.1 MG/DL — SIGNIFICANT CHANGE UP (ref 1.8–2.4)
MCHC RBC-ENTMCNC: 23.2 PG — LOW (ref 27–31)
MCHC RBC-ENTMCNC: 31.5 G/DL — LOW (ref 32–37)
MCV RBC AUTO: 73.6 FL — LOW (ref 81–99)
NRBC # BLD: 0 /100 WBCS — SIGNIFICANT CHANGE UP (ref 0–0)
ORGANISM # SPEC MICROSCOPIC CNT: SIGNIFICANT CHANGE UP
ORGANISM # SPEC MICROSCOPIC CNT: SIGNIFICANT CHANGE UP
PLATELET # BLD AUTO: 240 K/UL — SIGNIFICANT CHANGE UP (ref 130–400)
PMV BLD: 10.3 FL — SIGNIFICANT CHANGE UP (ref 7.4–10.4)
POTASSIUM SERPL-MCNC: 4.1 MMOL/L — SIGNIFICANT CHANGE UP (ref 3.5–5)
POTASSIUM SERPL-SCNC: 4.1 MMOL/L — SIGNIFICANT CHANGE UP (ref 3.5–5)
PROT SERPL-MCNC: 5.4 G/DL — LOW (ref 6–8)
RBC # BLD: 4.66 M/UL — SIGNIFICANT CHANGE UP (ref 4.2–5.4)
RBC # FLD: 21.4 % — HIGH (ref 11.5–14.5)
SODIUM SERPL-SCNC: 137 MMOL/L — SIGNIFICANT CHANGE UP (ref 135–146)
SPECIMEN SOURCE: SIGNIFICANT CHANGE UP
WBC # BLD: 6.02 K/UL — SIGNIFICANT CHANGE UP (ref 4.8–10.8)
WBC # FLD AUTO: 6.02 K/UL — SIGNIFICANT CHANGE UP (ref 4.8–10.8)

## 2023-10-05 PROCEDURE — 99291 CRITICAL CARE FIRST HOUR: CPT

## 2023-10-05 PROCEDURE — 99233 SBSQ HOSP IP/OBS HIGH 50: CPT

## 2023-10-05 PROCEDURE — 71045 X-RAY EXAM CHEST 1 VIEW: CPT | Mod: 26

## 2023-10-05 RX ADMIN — MIDODRINE HYDROCHLORIDE 10 MILLIGRAM(S): 2.5 TABLET ORAL at 13:07

## 2023-10-05 RX ADMIN — PANTOPRAZOLE SODIUM 40 MILLIGRAM(S): 20 TABLET, DELAYED RELEASE ORAL at 18:11

## 2023-10-05 RX ADMIN — CHLORHEXIDINE GLUCONATE 1 APPLICATION(S): 213 SOLUTION TOPICAL at 05:17

## 2023-10-05 RX ADMIN — HEPARIN SODIUM 5000 UNIT(S): 5000 INJECTION INTRAVENOUS; SUBCUTANEOUS at 18:11

## 2023-10-05 RX ADMIN — MEROPENEM 100 MILLIGRAM(S): 1 INJECTION INTRAVENOUS at 21:27

## 2023-10-05 RX ADMIN — MEROPENEM 100 MILLIGRAM(S): 1 INJECTION INTRAVENOUS at 05:16

## 2023-10-05 RX ADMIN — HEPARIN SODIUM 5000 UNIT(S): 5000 INJECTION INTRAVENOUS; SUBCUTANEOUS at 05:16

## 2023-10-05 RX ADMIN — MIDODRINE HYDROCHLORIDE 10 MILLIGRAM(S): 2.5 TABLET ORAL at 21:48

## 2023-10-05 RX ADMIN — PANTOPRAZOLE SODIUM 40 MILLIGRAM(S): 20 TABLET, DELAYED RELEASE ORAL at 05:16

## 2023-10-05 RX ADMIN — SENNA PLUS 2 TABLET(S): 8.6 TABLET ORAL at 21:48

## 2023-10-05 RX ADMIN — MEROPENEM 100 MILLIGRAM(S): 1 INJECTION INTRAVENOUS at 13:06

## 2023-10-05 NOTE — SWALLOW BEDSIDE ASSESSMENT ADULT - SLP GENERAL OBSERVATIONS
Pt received sitting upright at 90 degrees in bed, on HFNC 50L/50%. Nonverbal and does not follow commands at baseline.

## 2023-10-05 NOTE — PROGRESS NOTE ADULT - ASSESSMENT
IMPRESSION:    Acute hypoxemic respiratory failure  on 40%  Suspected upper GI bleed s/p 2 units pRBC  Possible duodenal perforation sp surgical/ GI eval no intervention  aspiration pneumonia  polymicrobial bacteremia   NSTEMI likely type II   NELA likely prerenal resolved  H/o CP  H/o seizures    PLAN:    CNS: Avoid CNS depressant    HEENT: Oral care    PULMONARY: HOB at 45 degrees.  aspiration precaution, keep SaO2 92 TO 96%    CARDIOVASCULAR: Wean levophed.   ECHO noted.      GI: feeding Protonix BID.  speech eval    RENAL:  Voiding trial Bladder scans.  FU lytes.  Correct as needed.      INFECTIOUS DISEASE:  abx per ID    HEMATOLOGICAL: DVT prophylaxis seq.  Monitor CBC.      ENDOCRINE:  Follow up FS.  Insulin protocol if needed.    MUSCULOSKELETAL: Bedrest.  Off loading.      Prognosis guarded.      DISPO: ICU  barlow for retention  multiple skin ulcers  palliative care fup

## 2023-10-05 NOTE — PROGRESS NOTE ADULT - SUBJECTIVE AND OBJECTIVE BOX
Over Night Events: events noted, remain critically ill, sp bronch low grade fever    PHYSICAL EXAM    ICU Vital Signs Last 24 Hrs  T(C): 37.3 (05 Oct 2023 04:00), Max: 37.3 (04 Oct 2023 20:00)  T(F): 99.2 (05 Oct 2023 04:00), Max: 99.2 (04 Oct 2023 20:00)  HR: 111 (05 Oct 2023 06:00) (50 - 111)  BP: 89/57 (05 Oct 2023 06:00) (84/45 - 152/64)  BP(mean): 69 (05 Oct 2023 06:00) (60 - 98)  RR: 25 (05 Oct 2023 06:00) (12 - 36)  SpO2: 97% (05 Oct 2023 06:00) (84% - 100%)    O2 Parameters below as of 05 Oct 2023 05:04  Patient On (Oxygen Delivery Method): nasal cannula, high flow  O2 Flow (L/min): 50  O2 Concentration (%): 50        General: ill looking  Lungs: Bilateral rhonchi  Cardiovascular: Regular   Abdomen: Soft, Positive BS  Extremities: No clubbing   contracted, not following commands      10-03-23 @ 07:01  -  10-04-23 @ 07:00  --------------------------------------------------------  IN:    Enteral Tube Flush: 30 mL    FentaNYL: 266.4 mL    IV PiggyBack: 300 mL    Jevity 1.2: 440 mL    Norepinephrine: 160.8 mL  Total IN: 1197.2 mL    OUT:    Indwelling Catheter - Urethral (mL): 1825 mL  Total OUT: 1825 mL    Total NET: -627.8 mL      10-04-23 @ 07:01  -  10-05-23 @ 06:29  --------------------------------------------------------  IN:    FentaNYL: 22.2 mL    IV PiggyBack: 150 mL    Jevity 1.2: 20 mL    Norepinephrine: 61.1 mL  Total IN: 253.3 mL    OUT:    Indwelling Catheter - Urethral (mL): 1690 mL  Total OUT: 1690 mL    Total NET: -1436.7 mL          LABS:                          10.0   7.07  )-----------( 207      ( 04 Oct 2023 04:41 )             31.5                                               10-04    136  |  99  |  6<L>  ----------------------------<  174<H>  3.6   |  30  |  0.6<L>    Ca    7.6<L>      04 Oct 2023 04:41  Mg     1.9     10-04    TPro  5.0<L>  /  Alb  2.3<L>  /  TBili  0.2  /  DBili  x   /  AST  25  /  ALT  27  /  AlkPhos  144<H>  10-04                                             Urinalysis Basic - ( 04 Oct 2023 04:41 )    Color: x / Appearance: x / SG: x / pH: x  Gluc: 174 mg/dL / Ketone: x  / Bili: x / Urobili: x   Blood: x / Protein: x / Nitrite: x   Leuk Esterase: x / RBC: x / WBC x   Sq Epi: x / Non Sq Epi: x / Bacteria: x                                                  LIVER FUNCTIONS - ( 04 Oct 2023 04:41 )  Alb: 2.3 g/dL / Pro: 5.0 g/dL / ALK PHOS: 144 U/L / ALT: 27 U/L / AST: 25 U/L / GGT: x                                                  Culture - Sputum (collected 04 Oct 2023 12:00)  Source: Trach Asp Tracheal Aspirate  Gram Stain (04 Oct 2023 22:58):    Few polymorphonuclear leukocytes per low power field    Rare Squamous epithelial cells per low power field    No organisms seen per oil power field    Culture - Sputum (collected 02 Oct 2023 11:10)  Source: Trach Asp Tracheal Aspirate  Gram Stain (03 Oct 2023 07:33):    Moderate polymorphonuclear leukocytes per low power field    Rare Squamous epithelial cells per low power field    Few Gram positive cocci in pairs seen per oil power field    Rare Gram Positive Rods seen per oil power field    Rare Yeast like cells seen per oil power field  Final Report (04 Oct 2023 17:12):    No growth at 48 hours                                                   Mode: AC/ CMV (Assist Control/ Continuous Mandatory Ventilation)  RR (machine): 14  TV (machine): 300  FiO2: 40  PEEP: 8  ITime: 0.9                                      ABG - ( 04 Oct 2023 13:52 )  pH, Arterial: 7.46  pH, Blood: x     /  pCO2: 49    /  pO2: 114   / HCO3: 35    / Base Excess: 9.4   /  SaO2: 99.0                MEDICATIONS  (STANDING):  chlorhexidine 2% Cloths 1 Application(s) Topical <User Schedule>  gabapentin 600 milliGRAM(s) Oral daily  heparin   Injectable 5000 Unit(s) SubCutaneous every 12 hours  levoFLOXacin IVPB 750 milliGRAM(s) IV Intermittent every 24 hours  meropenem  IVPB 1000 milliGRAM(s) IV Intermittent every 8 hours  midodrine 10 milliGRAM(s) Oral every 8 hours  norepinephrine Infusion 0.05 MICROgram(s)/kG/Min (5.18 mL/Hr) IV Continuous <Continuous>  pantoprazole  Injectable 40 milliGRAM(s) IV Push two times a day  polyethylene glycol 3350 17 Gram(s) Oral two times a day  senna 2 Tablet(s) Oral at bedtime    MEDICATIONS  (PRN):  acetaminophen   Oral Liquid .. 650 milliGRAM(s) Enteral Tube every 6 hours PRN Temp greater or equal to 38C (100.4F)    CXR P   Over Night Events: events noted, remain critically ill, sp bronch low grade fever    PHYSICAL EXAM    ICU Vital Signs Last 24 Hrs  T(C): 37.3 (05 Oct 2023 04:00), Max: 37.3 (04 Oct 2023 20:00)  T(F): 99.2 (05 Oct 2023 04:00), Max: 99.2 (04 Oct 2023 20:00)  HR: 111 (05 Oct 2023 06:00) (50 - 111)  BP: 89/57 (05 Oct 2023 06:00) (84/45 - 152/64)  BP(mean): 69 (05 Oct 2023 06:00) (60 - 98)  RR: 25 (05 Oct 2023 06:00) (12 - 36)  SpO2: 97% (05 Oct 2023 06:00) (84% - 100%)    O2 Parameters below as of 05 Oct 2023 05:04  Patient On (Oxygen Delivery Method): nasal cannula, high flow  O2 Flow (L/min): 50  O2 Concentration (%): 50        General: ill looking  Lungs: Bilateral rhonchi  Cardiovascular: Regular   Abdomen: Soft, Positive BS  Extremities: No clubbing   contracted, not following commands      10-03-23 @ 07:01  -  10-04-23 @ 07:00  --------------------------------------------------------  IN:    Enteral Tube Flush: 30 mL    FentaNYL: 266.4 mL    IV PiggyBack: 300 mL    Jevity 1.2: 440 mL    Norepinephrine: 160.8 mL  Total IN: 1197.2 mL    OUT:    Indwelling Catheter - Urethral (mL): 1825 mL  Total OUT: 1825 mL    Total NET: -627.8 mL      10-04-23 @ 07:01  -  10-05-23 @ 06:29  --------------------------------------------------------  IN:    FentaNYL: 22.2 mL    IV PiggyBack: 150 mL    Jevity 1.2: 20 mL    Norepinephrine: 61.1 mL  Total IN: 253.3 mL    OUT:    Indwelling Catheter - Urethral (mL): 1690 mL  Total OUT: 1690 mL    Total NET: -1436.7 mL          LABS:                          10.0   7.07  )-----------( 207      ( 04 Oct 2023 04:41 )             31.5                                               10-04    136  |  99  |  6<L>  ----------------------------<  174<H>  3.6   |  30  |  0.6<L>    Ca    7.6<L>      04 Oct 2023 04:41  Mg     1.9     10-04    TPro  5.0<L>  /  Alb  2.3<L>  /  TBili  0.2  /  DBili  x   /  AST  25  /  ALT  27  /  AlkPhos  144<H>  10-04                                             Urinalysis Basic - ( 04 Oct 2023 04:41 )    Color: x / Appearance: x / SG: x / pH: x  Gluc: 174 mg/dL / Ketone: x  / Bili: x / Urobili: x   Blood: x / Protein: x / Nitrite: x   Leuk Esterase: x / RBC: x / WBC x   Sq Epi: x / Non Sq Epi: x / Bacteria: x                                                  LIVER FUNCTIONS - ( 04 Oct 2023 04:41 )  Alb: 2.3 g/dL / Pro: 5.0 g/dL / ALK PHOS: 144 U/L / ALT: 27 U/L / AST: 25 U/L / GGT: x                                                  Culture - Sputum (collected 04 Oct 2023 12:00)  Source: Trach Asp Tracheal Aspirate  Gram Stain (04 Oct 2023 22:58):    Few polymorphonuclear leukocytes per low power field    Rare Squamous epithelial cells per low power field    No organisms seen per oil power field    Culture - Sputum (collected 02 Oct 2023 11:10)  Source: Trach Asp Tracheal Aspirate  Gram Stain (03 Oct 2023 07:33):    Moderate polymorphonuclear leukocytes per low power field    Rare Squamous epithelial cells per low power field    Few Gram positive cocci in pairs seen per oil power field    Rare Gram Positive Rods seen per oil power field    Rare Yeast like cells seen per oil power field  Final Report (04 Oct 2023 17:12):    No growth at 48 hours                                                   Mode: AC/ CMV (Assist Control/ Continuous Mandatory Ventilation)  RR (machine): 14  TV (machine): 300  FiO2: 40  PEEP: 8  ITime: 0.9                                      ABG - ( 04 Oct 2023 13:52 )  pH, Arterial: 7.46  pH, Blood: x     /  pCO2: 49    /  pO2: 114   / HCO3: 35    / Base Excess: 9.4   /  SaO2: 99.0                MEDICATIONS  (STANDING):  chlorhexidine 2% Cloths 1 Application(s) Topical <User Schedule>  gabapentin 600 milliGRAM(s) Oral daily  heparin   Injectable 5000 Unit(s) SubCutaneous every 12 hours  levoFLOXacin IVPB 750 milliGRAM(s) IV Intermittent every 24 hours  meropenem  IVPB 1000 milliGRAM(s) IV Intermittent every 8 hours  midodrine 10 milliGRAM(s) Oral every 8 hours  norepinephrine Infusion 0.05 MICROgram(s)/kG/Min (5.18 mL/Hr) IV Continuous <Continuous>  pantoprazole  Injectable 40 milliGRAM(s) IV Push two times a day  polyethylene glycol 3350 17 Gram(s) Oral two times a day  senna 2 Tablet(s) Oral at bedtime    MEDICATIONS  (PRN):  acetaminophen   Oral Liquid .. 650 milliGRAM(s) Enteral Tube every 6 hours PRN Temp greater or equal to 38C (100.4F)    CXR noted

## 2023-10-05 NOTE — PROGRESS NOTE ADULT - SUBJECTIVE AND OBJECTIVE BOX
HPI:  58 year old female with a PMHx of Down syndrome, nonverbal at baseline, hypothyroidism, cerebral palsy, congenital pulmonary stenosis presents to the ED for hemoptysis.    Interval history  -Patient seen at bedside  -She is now extubated and does not participate in exam     ADVANCE DIRECTIVES:     [x ] Full Code [ ] DNR  MOLST  [ ]  Living Will  [ ]   DECISION MAKER(s):  [ ] Health Care Proxy(s)  [x ] Surrogate(s)  [x ] Guardian           Name(s): Phone Number(s):  [facility]      BASELINE (I)ADL(s) (prior to admission):    Johnson: [ ]Total  [ ] Moderate [ ]Dependent  Palliative Performance Status Version 2:         %    http://npcrc.org/files/news/palliative_performance_scale_ppsv2.pdf    Allergies    No Known Allergies    Intolerances    MEDICATIONS  (STANDING):  chlorhexidine 2% Cloths 1 Application(s) Topical <User Schedule>  gabapentin 600 milliGRAM(s) Oral daily  heparin   Injectable 5000 Unit(s) SubCutaneous every 12 hours  levoFLOXacin IVPB 750 milliGRAM(s) IV Intermittent every 24 hours  meropenem  IVPB 1000 milliGRAM(s) IV Intermittent every 8 hours  midodrine 10 milliGRAM(s) Oral every 8 hours  norepinephrine Infusion 0.05 MICROgram(s)/kG/Min (5.18 mL/Hr) IV Continuous <Continuous>  pantoprazole  Injectable 40 milliGRAM(s) IV Push two times a day  polyethylene glycol 3350 17 Gram(s) Oral two times a day  senna 2 Tablet(s) Oral at bedtime    MEDICATIONS  (PRN):  acetaminophen   Oral Liquid .. 650 milliGRAM(s) Enteral Tube every 6 hours PRN Temp greater or equal to 38C (100.4F)          PRESENT SYMPTOMS: [x ]Unable to obtain due to poor mentation   Source if other than patient:  [ ]Family   [ ]Team     Pain: [ ]yes [ ]no  QOL impact -   Location -                    Aggravating factors -  Quality -  Radiation -  Timing-  Severity (0-10 scale):  Minimal acceptable level (0-10 scale):     CPOT:  0  https://www.sccm.org/getattachment/avq35s08-8r6f-6c7s-1y3n-6940q4314g2v/Critical-Care-Pain-Observation-Tool-(CPOT)    PAIN AD Score:   [email or web address] (press ctrl +  left click to view)    Dyspnea:                           [ ]None[ ]Mild [ ]Moderate [ ]Severe     Respiratory Distress Observation Scale (RDOS): 0  A score of 0 to 2 signifies little or no respiratory distress, 3 signifies mild distress, scores 4 to 6 indicate moderate distress, and scores greater than 7 signify severe distress  [email or web address]-gsovlbcujih-[email or web address]-[email or web address]    Anxiety:                             [ ]None[ ]Mild [ ]Moderate [ ]Severe   Fatigue:                             [ ]None[ ]Mild [ ]Moderate [ ]Severe   Nausea:                             [ ]None[ ]Mild [ ]Moderate [ ]Severe   Loss of appetite:              [ ]None[ ]Mild [ ]Moderate [ ]Severe   Constipation:                    [ ]None[ ]Mild [ ]Moderate [ ]Severe    Other Symptoms:  [ ]All other review of systems negative     Palliative Performance Status Version 2:         10%    [email or web address]/news/palliative_performance_scale_[email or web address].pdf    PHYSICAL EXAM:  Vital Signs Last 24 Hrs  T(C): 37.7 (05 Oct 2023 12:00), Max: 37.7 (05 Oct 2023 12:00)  T(F): 99.8 (05 Oct 2023 12:00), Max: 99.8 (05 Oct 2023 12:00)  HR: 104 (05 Oct 2023 12:00) (76 - 111)  BP: 101/66 (05 Oct 2023 12:00) (84/45 - 152/64)  BP(mean): 74 (05 Oct 2023 12:00) (60 - 98)  RR: 26 (05 Oct 2023 12:00) (13 - 36)  SpO2: 98% (05 Oct 2023 12:00) (84% - 100%)    Parameters below as of 05 Oct 2023 12:00  Patient On (Oxygen Delivery Method): nasal cannula, high flow  O2 Flow (L/min): 50  O2 Concentration (%): 50    GENERAL:  [ x] No acute distress [ ]Lethargic  [ ]Unarousable  [ ]Verbal  [x ]Non-Verbal [ ]Cachexia    BEHAVIORAL/PSYCH:  [ ]Alert and Oriented x  [ ] Anxiety [ ] Delirium [ ] Agitation [ x] Calm   EYES: [ ] No scleral icterus [ ] Scleral icterus [x ] Closed  ENMT:  [ ]Dry mouth  [ x]No external oral lesions [ ] No external ear or nose lesions  CARDIOVASCULAR:  [ ]Regular [ ]Irregular [x ]Tachy [ ]Not Tachy  [ ]Raheem [ ] Edema [ ] No edema  PULMONARY:  [ ]Tachypnea  [ ]Audible excessive secretions [x] No labored breathing [ ] labored breathing  GASTROINTESTINAL: [ ]Soft  [ ]Distended  [ ]Not distended [ ]Non tender [ ]Tender  MUSCULOSKELETAL: [ ]No clubbing [ ] clubbing  [x ] No cyanosis [ ] cyanosis  NEUROLOGIC: [ ]No focal deficits  [ ]Follows commands  [x ]Does not follow commands  [ ]Cognitive impairment  [ ]Dysphagia  [ ]Dysarthria  [ ]Paresis   SKIN: [ ] Jaundiced [ ] Non-jaundiced [ ]Rash [x]No Rash [ ] Warm [ ] Dry  MISC/LINES: [ x] ET tube [ ] Trach [ ]NGT/OGT [ ]PEG [ ]Madsen    LABS: reviewed by me                          10.8   6.02  )-----------( 240      ( 05 Oct 2023 09:02 )             34.3       10-05    137  |  98  |  5<L>  ----------------------------<  84  4.1   |  27  |  0.6<L>    Ca    8.2<L>      05 Oct 2023 09:02  Mg     2.1     10-05    TPro  5.4<L>  /  Alb  2.8<L>  /  TBili  0.4  /  DBili  x   /  AST  32  /  ALT  27  /  AlkPhos  126<H>  10-05          ABG - ( 04 Oct 2023 13:52 )  pH, Arterial: 7.46  pH, Blood: x     /  pCO2: 49    /  pO2: 114   / HCO3: 35    / Base Excess: 9.4   /  SaO2: 99.0                Urinalysis Basic - ( 05 Oct 2023 09:02 )    Color: x / Appearance: x / SG: x / pH: x  Gluc: 84 mg/dL / Ketone: x  / Bili: x / Urobili: x   Blood: x / Protein: x / Nitrite: x   Leuk Esterase: x / RBC: x / WBC x   Sq Epi: x / Non Sq Epi: x / Bacteria: x                  CAPILLARY BLOOD GLUCOSE                  RADIOLOGY & ADDITIONAL STUDIES: reviewed by me    < from: Xray Chest 1 View-PORTABLE IMMEDIATE (Xray Chest 1 View-PORTABLE IMMEDIATE .) (10.05.23 @ 07:48) >  FINDINGS/  IMPRESSION:    Interval removal. Lines and tubes.    Decreased bilateral effusions. Bilateral parenchymal opacities, overall   similar to prior exam. Short-term follow-up recommended.    Stable cardiomediastinal silhouette.    Unchanged osseous structures.    < end of copied text >        EKG: reviewed by me    < from: 12 Lead ECG (09.30.23 @ 06:50) >    Ventricular Rate 57 BPM    Atrial Rate 57 BPM    P-R Interval 120 ms    QRS Duration 64 ms    Q-T Interval 452 ms    QTC Calculation(Bazett) 439 ms    P Axis 3 degrees    R Axis 77 degrees    T Axis 69 degrees    Diagnosis Line Sinus bradycardia  Baseline artifact  Otherwise normal ECG    < end of copied text >        PROTEIN CALORIE MALNUTRITION PRESENT: [ ]mild [ ]moderate [ ]severe [ ]underweight [ ]morbid obesity  https://www.andeal.org/vault/2440/web/files/ONC/Table_Clinical%20Characteristics%20to%20Document%20Malnutrition-White%20JV%20et%20al%998437.pdf    Height (cm): 142.2 (08-12-23 @ 00:41), 142.2 (08-04-23 @ 09:15)  Weight (kg): 52.1 (08-15-23 @ 00:00), 74.4 (08-11-23 @ 21:15)  BMI (kg/m2): 25.8 (08-15-23 @ 00:00), 36.8 (08-12-23 @ 00:41), 36.8 (08-11-23 @ 21:15)  [ ]PPSV2 < or = to 30% [ ]significant weight loss  [ ]poor nutritional intake  [ ]anasarca      [ ]Artificial Nutrition      Palliative Care Spiritual/Emotional Screening Tool Question  Severity (0-4):                    OR                    [ x] Unable to determine. Will assess at later time if appropriate.  Score of 2 or greater indicates recommendation of Chaplaincy and/or SW referral  Chaplaincy Referral: [ ] Yes [ ] Refused [ ] Following     Caregiver [place]:  [ ] Yes [ ] No    OR    [x ] Unable to determine. Will assess at later time if appropriate.  Social Work Referral [ ]  Patient and Family Centered Care Referral [ ]    Anticipatory Grief Present: [ ] Yes [ ] No    OR     [ x] Unable to determine. Will assess at later time if appropriate.  Social Work Referral [ ]  Patient and Family Centered Care Referral [ ]    Patient discussed with primary medical team MD  Palliative care education provided to patient and/or family

## 2023-10-05 NOTE — SWALLOW FEES ASSESSMENT ADULT - SLP PERTINENT HISTORY OF CURRENT PROBLEM
58F with PMH of Down syndrome, cerebral palsy, severe intellectual disability, nonverbal at baseline, hypothyroidism, congenital pulmonary stenosis, chronic pressure ulcers, orthostatic hypotension, constipation, presents to the ED for coffee ground emesis and AMS. Patient was recently admitted to the hospital for management of pneumonia and resultant hypoxic respiratory failure. Patient admitted to MICU for presumptive upper GI bleed. Surgery consulted for CT [facility].

## 2023-10-05 NOTE — SWALLOW FEES ASSESSMENT ADULT - LARYNGEAL PENETRATION DURING SWALLOW - SILENT
to underside of epiglottic and occasionally to interarytenoid space. Some of this residue cleared with secondary swallows/Trace

## 2023-10-05 NOTE — SWALLOW BEDSIDE ASSESSMENT ADULT - SLP PERTINENT HISTORY OF CURRENT PROBLEM
58F with PMH of Down syndrome, cerebral palsy, severe intellectual disability, nonverbal at baseline, hypothyroidism, congenital pulmonary stenosis, chronic pressure ulcers, orthostatic hypotension, constipation, presents to the ED for coffee ground emesis and AMS. Patient was recently admitted to the hospital for management of pneumonia and resultant hypoxic respiratory failure. Patient admitted to MICU for presumptive upper GI bleed. Surgery consulted for CT findings [facility].

## 2023-10-05 NOTE — SWALLOW FEES ASSESSMENT ADULT - PHARYNGEAL PHASE COMMENTS
Mild pharyngeal dysphagia for mildly thick, moderately thick, and pureed solids with penetration w/o aspiration.

## 2023-10-05 NOTE — SWALLOW FEES ASSESSMENT ADULT - CONSISTENCIES ADMINISTERED
thins not attempted 2/2 respiratory status and anterior loss with thins at the bedside/moderately thick/mildly thick/pureed

## 2023-10-05 NOTE — SWALLOW BEDSIDE ASSESSMENT ADULT - COMMENTS
Pt is well known to SLP services with [facility] in August with recs for Puree/thins. + toleration observed without overt symptoms of penetration/aspiration

## 2023-10-05 NOTE — SWALLOW FEES ASSESSMENT ADULT - DIAGNOSTIC IMPRESSIONS
Moderate oral dysphagia with mild pharyngeal dysphagia for mildly thick, moderately thick, and pureed solids.

## 2023-10-05 NOTE — SWALLOW FEES ASSESSMENT ADULT - ADDITIONAL RECOMMENDATIONS
SLP services to consider upgrade at bedside as respiratory status improves. (recent MBS last month with recs for puree/thins).

## 2023-10-05 NOTE — PROGRESS NOTE ADULT - SUBJECTIVE AND OBJECTIVE BOX
24H events:    Patient is a 58y old Female who presents with a chief complaint of Upper GI bleed (03 Oct 2023 06:04)    Primary diagnosis of GI bleed    Today is 6d of hospitalization. This morning patient was seen and examined at bedside. Extubated. Off pressors. Large BM over night. Madsen patent. No acute or major events overnight.    Code Status: Full Code    Family communication:  Contact date:  Name of person contacted:  Relationship to patient:  Communication details:  What matters most:    PAST MEDICAL & SURGICAL HISTORY  Down syndrome  Osteoporosis  Mild anemia  Neuropathy  S/P debridement  of R hip on 3/2/21      SOCIAL HISTORY:  Social History: unable to obtain      ALLERGIES:  No Known Allergies    ROS: All negative except as listed above.    VITAL SIGNS:  ICU Vital Signs Last 24 Hrs  T(C): 36.8 (05 Oct 2023 08:00), Max: 37.3 (04 Oct 2023 20:00)  T(F): 98.3 (05 Oct 2023 08:00), Max: 99.2 (04 Oct 2023 20:00)  HR: 76 (05 Oct 2023 09:00) (50 - 111)  BP: 96/55 (05 Oct 2023 09:00) (84/45 - 152/64)  BP(mean): 72 (05 Oct 2023 09:00) (60 - 98)  ABP: --  ABP(mean): --  RR: 17 (05 Oct 2023 09:00) (12 - 36)  SpO2: 96% (05 Oct 2023 09:00) (84% - 100%)    O2 Parameters below as of 05 Oct 2023 09:00  Patient On (Oxygen Delivery Method): nasal cannula, high flow  O2 Flow (L/min): 50  O2 Concentration (%): 50      10-04 @ 07:01  -  10-05 @ 07:00  --------------------------------------------------------  IN: 253.3 mL / OUT: 1790 mL / NET: -1536.7 mL    10-05 @ 07:01  -  10-05 @ 10:03  --------------------------------------------------------  IN: 0 mL / OUT: 80 mL / NET: -80 mL    PHYSICAL EXAM:    GENERAL: NAD, lying in bed comfortably  HEAD:  Atraumatic, normocephalic  EYES: EOMI, PERRLA, conjunctiva and sclera clear  NECK: Supple, trachea midline, no JVD  HEART: Regular rate and rhythm, no murmurs, rubs, or gallops  LUNGS: Unlabored respirations.  Clear to auscultation bilaterally, no crackles, wheezing, or rhonchi  ABDOMEN: Soft, nontender, nondistended, +BS  EXTREMITIES: 2+ peripheral pulses bilaterally, cap refill<2 secs. No clubbing, cyanosis, or edema  NERVOUS SYSTEM:  A&Ox3, following commands, moving all extremities, no focal deficits   SKIN: No rashes or lesions    MEDICATIONS:  MEDICATIONS  (STANDING):  chlorhexidine 2% Cloths 1 Application(s) Topical <User Schedule>  gabapentin 600 milliGRAM(s) Oral daily  heparin   Injectable 5000 Unit(s) SubCutaneous every 12 hours  levoFLOXacin IVPB 750 milliGRAM(s) IV Intermittent every 24 hours  meropenem  IVPB 1000 milliGRAM(s) IV Intermittent every 8 hours  midodrine 10 milliGRAM(s) Oral every 8 hours  norepinephrine Infusion 0.05 MICROgram(s)/kG/Min (5.18 mL/Hr) IV Continuous <Continuous>  pantoprazole  Injectable 40 milliGRAM(s) IV Push two times a day  polyethylene glycol 3350 17 Gram(s) Oral two times a day  senna 2 Tablet(s) Oral at bedtime    MEDICATIONS  (PRN):  acetaminophen   Oral Liquid .. 650 milliGRAM(s) Enteral Tube every 6 hours PRN Temp greater or equal to 38C (100.4F)      ALLERGIES:  Allergies    No Known Allergies    Intolerances        LABS:                        10.8   6.02  )-----------( 240      ( 05 Oct 2023 09:02 )             34.3     10-05    137  |  98  |  5<L>  ----------------------------<  84  4.1   |  27  |  0.6<L>    Ca    8.2<L>      05 Oct 2023 09:02  Mg     2.1     10-05    TPro  5.4<L>  /  Alb  2.8<L>  /  TBili  0.4  /  DBili  x   /  AST  32  /  ALT  27  /  AlkPhos  126<H>  10-05      Urinalysis Basic - ( 05 Oct 2023 09:02 )    Color: x / Appearance: x / SG: x / pH: x  Gluc: 84 mg/dL / Ketone: x  / Bili: x / Urobili: x   Blood: x / Protein: x / Nitrite: x   Leuk Esterase: x / RBC: x / WBC x   Sq Epi: x / Non Sq Epi: x / Bacteria: x      ABG:  pH, Arterial: 7.46 (10-04-23 @ 13:52)  pCO2, Arterial: 49 mmHg (10-04-23 @ 13:52)  pO2, Arterial: 114 mmHg (10-04-23 @ 13:52)  pH, Arterial: 7.46 (10-04-23 @ 12:35)  pCO2, Arterial: 50 mmHg (10-04-23 @ 12:35)  pO2, Arterial: 86 mmHg (10-04-23 @ 12:35)  pH, Arterial: 7.46 (10-04-23 @ 10:04)  pCO2, Arterial: 48 mmHg (10-04-23 @ 10:04)  pO2, Arterial: 87 mmHg (10-04-23 @ 10:04)      Micro:    Culture - Blood (collected 09-29-23 @ 09:47)  Source: .Blood Blood-Peripheral  Final Report (10-04-23 @ 17:00):    No growth at 5 days    Culture - Blood (collected 09-29-23 @ 09:47)  Source: .Blood Blood-Peripheral  Gram Stain (10-01-23 @ 09:15):    Growth in aerobic bottle: Gram Positive Rods and Gram positive cocci in    pairs    Growth in anaerobic bottle: Gram positive cocci in pairs  Preliminary Report (10-02-23 @ 11:37):    Growth in aerobic bottle: Corynebacterium amycolatum "Susceptibilities    not performed"    Growth in anaerobic bottle: Peptoniphilus harei group "Susceptibilities    not performed"    Growth in aerobic bottle: Gram positive cocci in pairs    Direct identification is available within approximately 3-5    hours either by Blood Panel Multiplexed PCR or Direct    MALDI-TOF. Details: [email or web address]  Organism: Blood Culture PCR (10-01-23 @ 01:07)  Organism: Blood Culture PCR (10-01-23 @ 01:07)      Method Type: PCR      -  Blood PCR Panel: NEG        Culture - Sputum (collected 10-04-23 @ 12:00)  Source: Trach Asp Tracheal Aspirate  Gram Stain (10-04-23 @ 22:58):    Few polymorphonuclear leukocytes per low power field    Rare Squamous epithelial cells per low power field    No organisms seen per oil power field    Culture - Sputum (collected 10-02-23 @ 11:10)  Source: Trach Asp Tracheal Aspirate  Gram Stain (10-03-23 @ 07:33):    Moderate polymorphonuclear leukocytes per low power field    Rare Squamous epithelial cells per low power field    Few Gram positive cocci in pairs seen per oil power field    Rare Gram Positive Rods seen per oil power field    Rare Yeast like cells seen per oil power field  Final Report (10-04-23 @ 17:12):    No growth at 48 hours        RADIOLOGY & ADDITIONAL TESTS: Reviewed.     24H events:    Patient is a 58y old Female who presents with a chief complaint of Upper GI bleed (03 Oct 2023 06:04)    Primary diagnosis of GI bleed    Today is 6d of hospitalization. This morning patient was seen and examined at bedside. Extubated. Off pressors. Large BM over night. Madsen patent. No acute or major events overnight.    Code Status: Full Code    Family communication:  Contact date:  Name of person contacted:  Relationship to patient:  Communication details:  What matters most:    PAST MEDICAL & SURGICAL HISTORY  Down syndrome  Osteoporosis  Mild anemia  Neuropathy  S/P debridement  of R hip on 3/2/21      SOCIAL HISTORY:  Social History: unable to obtain      ALLERGIES:  No Known Allergies    ROS: All negative except as listed above.    VITAL SIGNS:  ICU Vital Signs Last 24 Hrs  T(C): 36.8 (05 Oct 2023 08:00), Max: 37.3 (04 Oct 2023 20:00)  T(F): 98.3 (05 Oct 2023 08:00), Max: 99.2 (04 Oct 2023 20:00)  HR: 76 (05 Oct 2023 09:00) (50 - 111)  BP: 96/55 (05 Oct 2023 09:00) (84/45 - 152/64)  BP(mean): 72 (05 Oct 2023 09:00) (60 - 98)  ABP: --  ABP(mean): --  RR: 17 (05 Oct 2023 09:00) (12 - 36)  SpO2: 96% (05 Oct 2023 09:00) (84% - 100%)    O2 Parameters below as of 05 Oct 2023 09:00  Patient On (Oxygen Delivery Method): nasal cannula, high flow  O2 Flow (L/min): 50  O2 Concentration (%): 50      10-04 @ 07:01  -  10-05 @ 07:00  --------------------------------------------------------  IN: 253.3 mL / OUT: 1790 mL / NET: -1536.7 mL    10-05 @ 07:01  -  10-05 @ 10:03  --------------------------------------------------------  IN: 0 mL / OUT: 80 mL / NET: -80 mL    PHYSICAL EXAM:    GENERAL: NAD, lying in bed comfortably  HEAD:  Atraumatic, normocephalic  EYES: EOMI, PERRLA, conjunctiva and sclera clear  NECK: Supple, trachea midline, no JVD  HEART: Regular rate and rhythm, no murmurs, rubs, or gallops  LUNGS: Unlabored respirations.  Clear to auscultation bilaterally, no crackles, wheezing, or rhonchi  ABDOMEN: Soft, nontender, nondistended, +BS  EXTREMITIES: 2+ peripheral pulses bilaterally, cap refill<2 secs. No clubbing, cyanosis, or edema  NERVOUS SYSTEM:  A&Ox3, following commands, moving all extremities, no focal deficits   SKIN: No rashes or lesions    MEDICATIONS:  MEDICATIONS  (STANDING):  chlorhexidine 2% Cloths 1 Application(s) Topical <User Schedule>  gabapentin 600 milliGRAM(s) Oral daily  heparin   Injectable 5000 Unit(s) SubCutaneous every 12 hours  levoFLOXacin IVPB 750 milliGRAM(s) IV Intermittent every 24 hours  meropenem  IVPB 1000 milliGRAM(s) IV Intermittent every 8 hours  midodrine 10 milliGRAM(s) Oral every 8 hours  norepinephrine Infusion 0.05 MICROgram(s)/kG/Min (5.18 mL/Hr) IV Continuous <Continuous>  pantoprazole  Injectable 40 milliGRAM(s) IV Push two times a day  polyethylene glycol 3350 17 Gram(s) Oral two times a day  senna 2 Tablet(s) Oral at bedtime    MEDICATIONS  (PRN):  acetaminophen   Oral Liquid .. 650 milliGRAM(s) Enteral Tube every 6 hours PRN Temp greater or equal to 38C (100.4F)      ALLERGIES:  Allergies    No Known Allergies    Intolerances        LABS:                        10.8   6.02  )-----------( 240      ( 05 Oct 2023 09:02 )             34.3     10-05    137  |  98  |  5<L>  ----------------------------<  84  4.1   |  27  |  0.6<L>    Ca    8.2<L>      05 Oct 2023 09:02  Mg     2.1     10-05    TPro  5.4<L>  /  Alb  2.8<L>  /  TBili  0.4  /  DBili  x   /  AST  32  /  ALT  27  /  AlkPhos  126<H>  10-05      Urinalysis Basic - ( 05 Oct 2023 09:02 )    Color: x / Appearance: x / SG: x / pH: x  Gluc: 84 mg/dL / Ketone: x  / Bili: x / Urobili: x   Blood: x / Protein: x / Nitrite: x   Leuk Esterase: x / RBC: x / WBC x   Sq Epi: x / Non Sq Epi: x / Bacteria: x      ABG:  pH, Arterial: 7.46 (10-04-23 @ 13:52)  pCO2, Arterial: 49 mmHg (10-04-23 @ 13:52)  pO2, Arterial: 114 mmHg (10-04-23 @ 13:52)  pH, Arterial: 7.46 (10-04-23 @ 12:35)  pCO2, Arterial: 50 mmHg (10-04-23 @ 12:35)  pO2, Arterial: 86 mmHg (10-04-23 @ 12:35)  pH, Arterial: 7.46 (10-04-23 @ 10:04)  pCO2, Arterial: 48 mmHg (10-04-23 @ 10:04)  pO2, Arterial: 87 mmHg (10-04-23 @ 10:04)      Micro:    Culture - Blood (collected 09-29-23 @ 09:47)  Source: .Blood Blood-Peripheral  Final Report (10-04-23 @ 17:00):    No growth at 5 days    Culture - Blood (collected 09-29-23 @ 09:47)  Source: .Blood Blood-Peripheral  Gram Stain (10-01-23 @ 09:15):    Growth in aerobic bottle: Gram Positive Rods and Gram positive cocci in    pairs    Growth in anaerobic bottle: Gram positive cocci in pairs  Preliminary Report (10-02-23 @ 11:37):    Growth in aerobic bottle: Corynebacterium amycolatum "Susceptibilities    not performed"    Growth in anaerobic bottle: Peptoniphilus harei group "Susceptibilities    not performed"    Growth in aerobic bottle: Gram positive cocci in pairs    Direct identification is available within approximately 3-5    hours either by Blood Panel Multiplexed PCR or Direct    MALDI-TOF. Details: [email or web address]  Organism: Blood Culture PCR (10-01-23 @ 01:07)  Organism: Blood Culture PCR (10-01-23 @ 01:07)      Method Type: PCR      -  Blood PCR Panel: NEG        Culture - Sputum (collected 10-04-23 @ 12:00)  Source: Trach Asp Tracheal Aspirate  Gram Stain (10-04-23 @ 22:58):    Few polymorphonuclear leukocytes per low power field    Rare Squamous epithelial cells per low power field    No organisms seen per oil power field    Culture - Sputum (collected 10-02-23 @ 11:10)  Source: Trach Asp Tracheal Aspirate  Gram Stain (10-03-23 @ 07:33):    Moderate polymorphonuclear leukocytes per low power field    Rare Squamous epithelial cells per low power field    Few Gram positive cocci in pairs seen per oil power field    Rare Gram Positive Rods seen per oil power field    Rare Yeast like cells seen per oil power field  Final Report (10-04-23 @ 17:12):    No growth at 48 hours        RADIOLOGY & ADDITIONAL TESTS: Reviewed.    < from: Xray Chest 1 View-PORTABLE IMMEDIATE (Xray Chest 1 View-PORTABLE IMMEDIATE .) (10.05.23 @ 07:48) >  PROCEDURE DATE:  10/05/2023          INTERPRETATION:  STUDY INDICATION: sob    TECHNIQUE:  Portable frontal view of the chest obtained.    COMPARISON: 10/4/2023    FINDINGS/  IMPRESSION:    Interval removal. Lines and tubes.    Decreased bilateral effusions. Bilateral parenchymal opacities, overall   similar to prior exam. Short-term follow-up recommended.    Stable cardiomediastinal silhouette.    Unchanged osseous structures.    --- End of Report ---      < end of copied text >

## 2023-10-05 NOTE — PROGRESS NOTE ADULT - ASSESSMENT
58yFemale with history of of cerebral palsy, down syndrome, severe intellectual disability, nonverbal at baseline, pulmonary stenosis presents with coffee ground emesis. Hospital course complicated by evidence of contained D3 perforation. Palliative care consulted for GOC.    Patient now extubated on HFNC. She is unable to participate in exam.    Patient has history of developmental delay and lives in a group home.  Patient has a NOK listed in the chart, but primary team spoke with her, and she has no contact with the patient anymore.  All decisions regarding care goes through CAB at this time.     MEDD (morphine equivalent daily dose):    Education about palliative care provided to patient/family.  See Recs below.    Please call [phone] with questions or concerns 24/7.   We will continue to follow.

## 2023-10-05 NOTE — PROGRESS NOTE ADULT - ASSESSMENT
TEA ECHAVARRIA is a 58F with PMH of Down syndrome, cerebral palsy, severe intellectual disability, nonverbal at baseline, hypothyroidism, congenital pulmonary stenosis, chronic pressure ulcers, orthostatic hypotension, constipation, presents to the ED for coffee ground emesis and AMS. Patient was recently admitted to the hospital for management of pneumonia and resultant hypoxic respiratory failure. Patient admitted to MICU for presumptive upper GI bleed. Surgery consulted for CT findings of duodenal perforation.    #Hypovolemic and septic shock  #Acute upper GI bleed s/p 2 units pRBC  #Possible perforation sp surgical evaluation  - Hb 9.9, s/p 2u pRBC, Hb 14.7 > 12 -> 10.1-> 9.5 -> 9.2 ---> 10.8, stable   - Surgery: no plans for any surgical intervention. Pt is tolerated feeds  - CTAP w/ oral and IV contrast: No contrast extravasation, Previous seen foci of retroperitoneal gas no longer identified.  - Started Minodrine 10 mg q8 10/4 and off levophed overnight   - Lactate trend 2.4 -> 2.00 -> 1.5, 1.3 -> 1.0   - CBC downtrending to 5.5 k   - c/w protonix BID for at least 8 weeks and then once daily   - Miralax BID with senna to have atleast 1-2 BMs per day    #Pneumonia, in the setting of recent hospitalization  #Likely aspiration  #Acute hypoxemic respiratory failure   - CXR b/l opacities  - Bronchoscopy today 10/4  - Passed SBT, extubated  - Off pressors since 2 am   - ID recs appreciated   - On IV meropenem 1 g q12 and IV Levaquin 750 mg q48h  - F/u Sputum Cx, NG on 10/2  - MRSA negative  - wean down FiO2 for a saturation above 92%    #NELA likely prerenal, resolved  - Cr 1.6 > 1.2 > 1.1  - voiding trial  - Madsen still patent, placed 10/3,     #NSTEMI likely type II  - EKG Sinus tachycardia, ST elevation, consider early repolarization  - Echo results 9/30 EF 55 to 60 %, Normal left systolic function Diastolic function could not be assessed.   - Trend trop: 0.34 -> 0.11     #Left hand Edema   - Burn team wound care recs appreciated   - Wound care: Please wash wound with soap and water, apply bacitracin, cover xeroform, wrap with cling twice a day.     - DVT ppx: Heparin  - Diet: NPO  - Activity: Bedrest      #Handoff:  - NPO till pass Speech and Swallow Eval

## 2023-10-06 LAB
ALBUMIN SERPL ELPH-MCNC: 2.8 G/DL — LOW (ref 3.5–5.2)
ALP SERPL-CCNC: 101 U/L — SIGNIFICANT CHANGE UP (ref 30–115)
ALT FLD-CCNC: 24 U/L — SIGNIFICANT CHANGE UP (ref 0–41)
ANION GAP SERPL CALC-SCNC: 8 MMOL/L — SIGNIFICANT CHANGE UP (ref 7–14)
AST SERPL-CCNC: 23 U/L — SIGNIFICANT CHANGE UP (ref 0–41)
BASOPHILS # BLD AUTO: 0.06 K/UL — SIGNIFICANT CHANGE UP (ref 0–0.2)
BASOPHILS NFR BLD AUTO: 1.3 % — HIGH (ref 0–1)
BILIRUB SERPL-MCNC: 0.3 MG/DL — SIGNIFICANT CHANGE UP (ref 0.2–1.2)
BUN SERPL-MCNC: 7 MG/DL — LOW (ref 10–20)
CALCIUM SERPL-MCNC: 8.5 MG/DL — SIGNIFICANT CHANGE UP (ref 8.4–10.5)
CHLORIDE SERPL-SCNC: 101 MMOL/L — SIGNIFICANT CHANGE UP (ref 98–110)
CO2 SERPL-SCNC: 30 MMOL/L — SIGNIFICANT CHANGE UP (ref 17–32)
CREAT SERPL-MCNC: 0.6 MG/DL — LOW (ref 0.7–1.5)
CULTURE RESULTS: NO GROWTH — SIGNIFICANT CHANGE UP
EGFR: 105 ML/MIN/1.73M2 — SIGNIFICANT CHANGE UP
EOSINOPHIL # BLD AUTO: 0.25 K/UL — SIGNIFICANT CHANGE UP (ref 0–0.7)
EOSINOPHIL NFR BLD AUTO: 5.5 % — SIGNIFICANT CHANGE UP (ref 0–8)
GLUCOSE SERPL-MCNC: 109 MG/DL — HIGH (ref 70–99)
HCT VFR BLD CALC: 32.5 % — LOW (ref 37–47)
HGB BLD-MCNC: 10.1 G/DL — LOW (ref 12–16)
IMM GRANULOCYTES NFR BLD AUTO: 0.7 % — HIGH (ref 0.1–0.3)
LYMPHOCYTES # BLD AUTO: 0.96 K/UL — LOW (ref 1.2–3.4)
LYMPHOCYTES # BLD AUTO: 21.2 % — SIGNIFICANT CHANGE UP (ref 20.5–51.1)
MAGNESIUM SERPL-MCNC: 2.1 MG/DL — SIGNIFICANT CHANGE UP (ref 1.8–2.4)
MCHC RBC-ENTMCNC: 23.2 PG — LOW (ref 27–31)
MCHC RBC-ENTMCNC: 31.1 G/DL — LOW (ref 32–37)
MCV RBC AUTO: 74.5 FL — LOW (ref 81–99)
MONOCYTES # BLD AUTO: 0.59 K/UL — SIGNIFICANT CHANGE UP (ref 0.1–0.6)
MONOCYTES NFR BLD AUTO: 13 % — HIGH (ref 1.7–9.3)
NEUTROPHILS # BLD AUTO: 2.64 K/UL — SIGNIFICANT CHANGE UP (ref 1.4–6.5)
NEUTROPHILS NFR BLD AUTO: 58.3 % — SIGNIFICANT CHANGE UP (ref 42.2–75.2)
NRBC # BLD: 0 /100 WBCS — SIGNIFICANT CHANGE UP (ref 0–0)
PHOSPHATE SERPL-MCNC: 3.1 MG/DL — SIGNIFICANT CHANGE UP (ref 2.1–4.9)
PLATELET # BLD AUTO: 282 K/UL — SIGNIFICANT CHANGE UP (ref 130–400)
PMV BLD: 10 FL — SIGNIFICANT CHANGE UP (ref 7.4–10.4)
POTASSIUM SERPL-MCNC: 4 MMOL/L — SIGNIFICANT CHANGE UP (ref 3.5–5)
POTASSIUM SERPL-SCNC: 4 MMOL/L — SIGNIFICANT CHANGE UP (ref 3.5–5)
PROT SERPL-MCNC: 5.4 G/DL — LOW (ref 6–8)
RBC # BLD: 4.36 M/UL — SIGNIFICANT CHANGE UP (ref 4.2–5.4)
RBC # FLD: 21.3 % — HIGH (ref 11.5–14.5)
SODIUM SERPL-SCNC: 139 MMOL/L — SIGNIFICANT CHANGE UP (ref 135–146)
SPECIMEN SOURCE: SIGNIFICANT CHANGE UP
WBC # BLD: 4.53 K/UL — LOW (ref 4.8–10.8)
WBC # FLD AUTO: 4.53 K/UL — LOW (ref 4.8–10.8)

## 2023-10-06 PROCEDURE — 71045 X-RAY EXAM CHEST 1 VIEW: CPT | Mod: 26

## 2023-10-06 PROCEDURE — 99291 CRITICAL CARE FIRST HOUR: CPT

## 2023-10-06 RX ORDER — MIDODRINE HYDROCHLORIDE 2.5 MG/1
10 TABLET ORAL EVERY 8 HOURS
Refills: 0 | Status: DISCONTINUED | OUTPATIENT
Start: 2023-10-06 | End: 2023-10-13

## 2023-10-06 RX ORDER — BACITRACIN ZINC 500 UNIT/G
1 OINTMENT IN PACKET (EA) TOPICAL
Refills: 0 | Status: DISCONTINUED | OUTPATIENT
Start: 2023-10-06 | End: 2023-10-13

## 2023-10-06 RX ORDER — POLYETHYLENE GLYCOL 3350 17 G/17G
17 POWDER, FOR SOLUTION ORAL
Refills: 0 | Status: DISCONTINUED | OUTPATIENT
Start: 2023-10-06 | End: 2023-10-13

## 2023-10-06 RX ORDER — SENNA PLUS 8.6 MG/1
2 TABLET ORAL AT BEDTIME
Refills: 0 | Status: DISCONTINUED | OUTPATIENT
Start: 2023-10-06 | End: 2023-10-13

## 2023-10-06 RX ORDER — GABAPENTIN 400 MG/1
600 CAPSULE ORAL DAILY
Refills: 0 | Status: DISCONTINUED | OUTPATIENT
Start: 2023-10-06 | End: 2023-10-13

## 2023-10-06 RX ORDER — ACETAMINOPHEN 500 MG
650 TABLET ORAL EVERY 6 HOURS
Refills: 0 | Status: DISCONTINUED | OUTPATIENT
Start: 2023-10-06 | End: 2023-10-13

## 2023-10-06 RX ADMIN — PANTOPRAZOLE SODIUM 40 MILLIGRAM(S): 20 TABLET, DELAYED RELEASE ORAL at 05:12

## 2023-10-06 RX ADMIN — PANTOPRAZOLE SODIUM 40 MILLIGRAM(S): 20 TABLET, DELAYED RELEASE ORAL at 18:12

## 2023-10-06 RX ADMIN — MIDODRINE HYDROCHLORIDE 10 MILLIGRAM(S): 2.5 TABLET ORAL at 05:11

## 2023-10-06 RX ADMIN — Medication 650 MILLIGRAM(S): at 23:17

## 2023-10-06 RX ADMIN — CHLORHEXIDINE GLUCONATE 1 APPLICATION(S): 213 SOLUTION TOPICAL at 07:04

## 2023-10-06 RX ADMIN — MIDODRINE HYDROCHLORIDE 10 MILLIGRAM(S): 2.5 TABLET ORAL at 22:27

## 2023-10-06 RX ADMIN — GABAPENTIN 600 MILLIGRAM(S): 400 CAPSULE ORAL at 13:28

## 2023-10-06 RX ADMIN — MEROPENEM 100 MILLIGRAM(S): 1 INJECTION INTRAVENOUS at 05:11

## 2023-10-06 RX ADMIN — Medication 650 MILLIGRAM(S): at 22:47

## 2023-10-06 RX ADMIN — MEROPENEM 100 MILLIGRAM(S): 1 INJECTION INTRAVENOUS at 22:27

## 2023-10-06 RX ADMIN — SENNA PLUS 2 TABLET(S): 8.6 TABLET ORAL at 22:27

## 2023-10-06 RX ADMIN — Medication 1 APPLICATION(S): at 18:11

## 2023-10-06 RX ADMIN — HEPARIN SODIUM 5000 UNIT(S): 5000 INJECTION INTRAVENOUS; SUBCUTANEOUS at 18:11

## 2023-10-06 RX ADMIN — POLYETHYLENE GLYCOL 3350 17 GRAM(S): 17 POWDER, FOR SOLUTION ORAL at 05:11

## 2023-10-06 RX ADMIN — HEPARIN SODIUM 5000 UNIT(S): 5000 INJECTION INTRAVENOUS; SUBCUTANEOUS at 05:11

## 2023-10-06 RX ADMIN — MIDODRINE HYDROCHLORIDE 10 MILLIGRAM(S): 2.5 TABLET ORAL at 13:28

## 2023-10-06 RX ADMIN — MEROPENEM 100 MILLIGRAM(S): 1 INJECTION INTRAVENOUS at 13:30

## 2023-10-06 NOTE — SWALLOW BEDSIDE ASSESSMENT ADULT - SLP PERTINENT HISTORY OF CURRENT PROBLEM
58F with PMH of Down syndrome, cerebral palsy, severe intellectual disability, nonverbal at baseline, hypothyroidism, congenital pulmonary stenosis, chronic pressure ulcers, orthostatic hypotension, constipation, presents to the ED for coffee ground emesis and AMS. Patient was recently admitted to the hospital for management of pneumonia and resultant hypoxic respiratory failure. Patient admitted to MICU for presumptive upper GI bleed. Surgery consulted for CT findings [facility]ation[facility] Bronchoscopy.

## 2023-10-06 NOTE — PROGRESS NOTE ADULT - SUBJECTIVE AND OBJECTIVE BOX
JERICHOTEA  58y, Female    All available historical data reviewed    OVERNIGHT EVENTS:    s/p extubation  NAD  HF  no pressors  has BMs  ROS:  unable to obtain history secondary to patient's mental status     VITALS:  T(F): 97.6, Max: 99.7 (10-06-23 @ 00:00)  HR: 125  BP: 128/65  RR: 29Vital Signs Last 24 Hrs  T(C): 36.4 (06 Oct 2023 12:00), Max: 37.6 (05 Oct 2023 20:00)  T(F): 97.6 (06 Oct 2023 12:00), Max: 99.7 (06 Oct 2023 00:00)  HR: 125 (06 Oct 2023 15:00) (62 - 125)  BP: 128/65 (06 Oct 2023 15:00) (93/53 - 135/81)  BP(mean): 90 (06 Oct 2023 15:00) (69 - 96)  RR: 29 (06 Oct 2023 15:00) (15 - 29)  SpO2: 97% (06 Oct 2023 15:00) (89% - 98%)    Parameters below as of 06 Oct 2023 15:00  Patient On (Oxygen Delivery Method): nasal cannula, high flow  O2 Flow (L/min): 50  O2 Concentration (%): 50    TESTS & MEASUREMENTS:                        10.1   4.53  )-----------( 282      ( 06 Oct 2023 04:15 )             32.5     10-06    139  |  101  |  7<L>  ----------------------------<  109<H>  4.0   |  30  |  0.6<L>    Ca    8.5      06 Oct 2023 04:15  Phos  3.1     10-06  Mg     2.1     10-06    TPro  5.4<L>  /  Alb  2.8<L>  /  TBili  0.3  /  DBili  x   /  AST  23  /  ALT  24  /  AlkPhos  101  10-06    LIVER FUNCTIONS - ( 06 Oct 2023 04:15 )  Alb: 2.8 g/dL / Pro: 5.4 g/dL / ALK PHOS: 101 U/L / ALT: 24 U/L / AST: 23 U/L / GGT: x             Culture - Sputum (collected 10-04-23 @ 12:00)  Source: Trach Asp Tracheal Aspirate  Gram Stain (10-04-23 @ 22:58):    Few polymorphonuclear leukocytes per low power field    Rare Squamous epithelial cells per low power field    No organisms seen per oil power field  Final Report (10-06-23 @ 10:31):    No growth    Culture - Sputum (collected 10-02-23 @ 11:10)  Source: Trach Asp Tracheal Aspirate  Gram Stain (10-03-23 @ 07:33):    Moderate polymorphonuclear leukocytes per low power field    Rare Squamous epithelial cells per low power field    Few Gram positive cocci in pairs seen per oil power field    Rare Gram Positive Rods seen per oil power field    Rare Yeast like cells seen per oil power field  Final Report (10-04-23 @ 17:12):    No growth at 48 hours      Urinalysis Basic - ( 06 Oct 2023 04:15 )    Color: x / Appearance: x / SG: x / pH: x  Gluc: 109 mg/dL / Ketone: x  / Bili: x / Urobili: x   Blood: x / Protein: x / Nitrite: x   Leuk Esterase: x / RBC: x / WBC x   Sq Epi: x / Non Sq Epi: x / Bacteria: x          RADIOLOGY & ADDITIONAL TESTS:  Personal review of radiological diagnostics performed  Echo and EKG results noted when applicable.     MEDICATIONS:  acetaminophen   Oral Liquid .. 650 milliGRAM(s) Enteral Tube every 6 hours PRN  bacitracin   Ointment 1 Application(s) Topical two times a day  chlorhexidine 2% Cloths 1 Application(s) Topical <User Schedule>  gabapentin 600 milliGRAM(s) Oral daily  heparin   Injectable 5000 Unit(s) SubCutaneous every 12 hours  levoFLOXacin IVPB 750 milliGRAM(s) IV Intermittent every 24 hours  meropenem  IVPB 1000 milliGRAM(s) IV Intermittent every 8 hours  midodrine 10 milliGRAM(s) Oral every 8 hours  norepinephrine Infusion 0.05 MICROgram(s)/kG/Min IV Continuous <Continuous>  pantoprazole  Injectable 40 milliGRAM(s) IV Push two times a day  polyethylene glycol 3350 17 Gram(s) Oral two times a day  senna 2 Tablet(s) Oral at bedtime      ANTIBIOTICS:  levoFLOXacin IVPB 750 milliGRAM(s) IV Intermittent every 24 hours  meropenem  IVPB 1000 milliGRAM(s) IV Intermittent every 8 hours

## 2023-10-06 NOTE — PROGRESS NOTE ADULT - SUBJECTIVE AND OBJECTIVE BOX
24H events:    Patient is a 58y old Female who presents with a chief complaint of Upper GI bleed (06 Oct 2023 06:07)    Primary diagnosis of GI bleed      Today is 7d of hospitalization. This morning patient was seen and examined at bedside, resting comfortably in bed.  Noted tachycardia over night, EKG performed showing sinus tachycardia.     Code Status:    Family communication:  Contact date:  Name of person contacted:  Relationship to patient:  Communication details:  What matters most:    PAST MEDICAL & SURGICAL HISTORY  Down syndrome    Osteoporosis    Mild anemia    Neuropathy    S/P debridement  of R hip on 3/2/21      SOCIAL HISTORY:  Social History:      ALLERGIES:  No Known Allergies    MEDICATIONS:  STANDING MEDICATIONS  bacitracin   Ointment 1 Application(s) Topical two times a day  chlorhexidine 2% Cloths 1 Application(s) Topical <User Schedule>  gabapentin 600 milliGRAM(s) Oral daily  heparin   Injectable 5000 Unit(s) SubCutaneous every 12 hours  levoFLOXacin IVPB 750 milliGRAM(s) IV Intermittent every 24 hours  meropenem  IVPB 1000 milliGRAM(s) IV Intermittent every 8 hours  midodrine 10 milliGRAM(s) Oral every 8 hours  norepinephrine Infusion 0.05 MICROgram(s)/kG/Min IV Continuous <Continuous>  pantoprazole  Injectable 40 milliGRAM(s) IV Push two times a day  polyethylene glycol 3350 17 Gram(s) Oral two times a day  senna 2 Tablet(s) Oral at bedtime    PRN MEDICATIONS  acetaminophen   Oral Liquid .. 650 milliGRAM(s) Enteral Tube every 6 hours PRN    VITALS:   T(F): 98  HR: 67  BP: 101/56  RR: 15  SpO2: 98%    PHYSICAL EXAM:  GENERAL:   ( x) NAD, lying in bed comfortably     (  ) obtunded     (  ) lethargic     (  ) somnolent    HEAD:   ( x) Atraumatic     (  ) hematoma     (  ) laceration (specify location:       )     NECK:  (x) Supple     (  ) neck stiffness     (  ) nuchal rigidity     (  )  no JVD     (  ) JVD present ( -- cm)    HEART:  Rate -->     (x) normal rate     (  ) bradycardic     (  ) tachycardic  Rhythm -->     (x) regular     (  ) regularly irregular     (  ) irregularly irregular  Murmurs -->     (x) normal s1s2     (  ) systolic murmur     (  ) diastolic murmur     (  ) continuous murmur      (  ) S3 present     (  ) S4 present    LUNGS:   ( x)Unlabored respirations     (  ) tachypnea  ( x) B/L air entry     (  ) decreased breath sounds in:  (location     )    ( x) no adventitious sound     (  ) crackles     (  ) wheezing      (  ) rhonchi      (specify location:       )  (  ) chest wall tenderness (specify location:       )    ABDOMEN:   ( x) Soft     (  ) tense   |   (X  ) nondistended     (  ) distended   |   ( X ) +BS     (  ) hypoactive bowel sounds     (  ) hyperactive bowel sounds  ( x) nontender     (  ) RUQ tenderness     (  ) RLQ tenderness     (  ) LLQ tenderness     (  ) epigastric tenderness     (  ) diffuse tenderness  (  ) Splenomegaly      (  ) Hepatomegaly      (  ) Jaundice     (  ) ecchymosis     EXTREMITIES:  ( x) Normal     (  ) Rash     (  ) ecchymosis     (  ) varicose veins      (  ) pitting edema     (  ) non-pitting edema   (  ) ulceration     (  ) gangrene:     (location:     )    NERVOUS SYSTEM:    ( )         A&Ox3     (  ) confused     (  ) lethargic   * at baseline   CN II-XII:     (  ) Intact     (  ) deficits found     (Specify:     )   Upper extremities:     (  ) no sensorimotor deficits     (  ) weakness     (  ) loss of proprioception/vibration     (  ) loss of touch/temperature (specify:    )  Lower extremities:     (  ) no sensorimotor deficits     (  ) weakness     (  ) loss of proprioception/vibration     (  ) loss of touch/temperature (specify:    )    SKIN:   ( X ) No rashes or lesions     (  ) maculopapular rash     (  ) pustules     (  ) vesicles     (  ) ulcer     (  ) ecchymosis     (specify location:     )    AMPAC score :    (  ) Indwelling Madsen Catheter:   Date inserted:    Reason (  ) Critical illness     (  ) urinary retention    (  ) Accurate Ins/Outs Monitoring     (  ) CMO patient    (  ) Central Line:   Date inserted:  Location: (  ) Right IJ     (  ) Left IJ     (  ) Right Fem     (  ) Left Fem    (  ) SPC        (  ) pigtail       (  ) PEG tube       (  ) colostomy       (  ) jejunostomy  (  ) U-Dall    LABS:                        10.1   4.53  )-----------( 282      ( 06 Oct 2023 04:15 )             32.5     10-06    139  |  101  |  7<L>  ----------------------------<  109<H>  4.0   |  30  |  0.6<L>    Ca    8.5      06 Oct 2023 04:15  Phos  3.1     10-06  Mg     2.1     10-06    TPro  5.4<L>  /  Alb  2.8<L>  /  TBili  0.3  /  DBili  x   /  AST  23  /  ALT  24  /  AlkPhos  101  10-06      Urinalysis Basic - ( 06 Oct 2023 04:15 )    Color: x / Appearance: x / SG: x / pH: x  Gluc: 109 mg/dL / Ketone: x  / Bili: x / Urobili: x   Blood: x / Protein: x / Nitrite: x   Leuk Esterase: x / RBC: x / WBC x   Sq Epi: x / Non Sq Epi: x / Bacteria: x      ABG - ( 04 Oct 2023 13:52 )  pH, Arterial: 7.46  pH, Blood: x     /  pCO2: 49    /  pO2: 114   / HCO3: 35    / Base Excess: 9.4   /  SaO2: 99.0                  Culture - Sputum (collected 04 Oct 2023 12:00)  Source: Trach Asp Tracheal Aspirate  Gram Stain (04 Oct 2023 22:58):    Few polymorphonuclear leukocytes per low power field    Rare Squamous epithelial cells per low power field    No organisms seen per oil power field  Preliminary Report (05 Oct 2023 17:49):    No growth                  RADIOLOGY:    < from: Xray Chest 1 View-PORTABLE IMMEDIATE (Xray Chest 1 View-PORTABLE IMMEDIATE .) (10.05.23 @ 07:48) >  ACC: [id number] EXAM:  XR CHEST PORTABLE IMMED 1V   ORDERED BY: JACOB MEJIA     PROCEDURE DATE:  10/05/2023          INTERPRETATION:  STUDY INDICATION: sob    TECHNIQUE:  Portable frontal view of the chest obtained.    COMPARISON: 10/4/2023    FINDINGS/  IMPRESSION:    Interval removal. Lines and tubes.    Decreased bilateral effusions. Bilateral parenchymal opacities, overall   similar to prior exam. Short-term follow-up recommended.    Stable cardiomediastinal silhouette.    Unchanged osseous structures.    --- End of Report ---            LYNN GUZMAN MD; Attending Radiologist  This document has been electronically signed. Oct  5 2023 10:15AM    < end of copied text >

## 2023-10-06 NOTE — PROGRESS NOTE ADULT - ASSESSMENT
TEA ECHAVARRIA is a 58F with PMH of Down syndrome, cerebral palsy, severe intellectual disability, nonverbal at baseline, hypothyroidism, congenital pulmonary stenosis, chronic pressure ulcers, orthostatic hypotension, constipation, presents to the ED for coffee ground emesis and AMS. Patient was recently admitted to the hospital for management of pneumonia and resultant hypoxic respiratory failure. Patient admitted to MICU for presumptive upper GI bleed. Surgery consulted for CT findings of duodenal perforation.    #Hypovolemic and septic shock  #Acute upper GI bleed s/p 2 units pRBC  #Possible perforation sp surgical evaluation  - Hb 9.9, s/p 2u pRBC, Hb 14.7 > 12 -> 10.1-> 9.5 -> 9.2 ---> 10.8, stable   - Surgery: no plans for any surgical intervention. Pt is tolerated feeds  - CTAP w/ oral and IV contrast: No contrast extravasation, Previous seen foci of retroperitoneal gas no longer identified.  - Started Minodrine 10 mg q8 10/4 and off levophed overnight   - Lactate trend 2.4 -> 2.00 -> 1.5, 1.3 -> 1.0   - CBC downtrending to 5.5 k   - c/w protonix BID for at least 8 weeks and then once daily   - Miralax BID with senna to have atleast 1-2 BMs per day    #Pneumonia, in the setting of recent hospitalization  #Likely aspiration  #Acute hypoxemic respiratory failure   - CXR b/l opacities  - Bronchoscopy today 10/4  - Passed SBT, extubated  - Off pressors since 2 am   - ID recs appreciated   - On IV meropenem 1 g q12 and IV Levaquin 750 mg q48h  - F/u Sputum Cx, NG on 10/2  - MRSA negative  - wean down FiO2 for a saturation above 92%    #NELA likely prerenal, resolved  - Cr 1.6 > 1.2 > 1.1  - voiding trial  - Madsen still patent, placed 10/3,     #NSTEMI likely type II  - EKG Sinus tachycardia, ST elevation, consider early repolarization  - Echo results 9/30 EF 55 to 60 %, Normal left systolic function Diastolic function could not be assessed.   - Trend trop: 0.34 -> 0.11     #Left hand Edema   - Continue with burn rec    - DVT ppx: Heparin  - Diet: NPO  - Activity: Bedrest      #Handoff:    - Soft feeds   - tachycardia resolved  - NPO till pass Speech and Swallow Eval

## 2023-10-06 NOTE — PROGRESS NOTE ADULT - SUBJECTIVE AND OBJECTIVE BOX
Over Night Events: events noted, remain critically ill, on HHFNC 50%, afebrile speech noted    PHYSICAL EXAM    ICU Vital Signs Last 24 Hrs  T(C): 36.8 (06 Oct 2023 04:00), Max: 37.7 (05 Oct 2023 12:00)  T(F): 98.3 (06 Oct 2023 04:00), Max: 99.8 (05 Oct 2023 12:00)  HR: 66 (06 Oct 2023 05:00) (65 - 125)  BP: 105/53 (06 Oct 2023 05:00) (93/53 - 132/63)  BP(mean): 77 (06 Oct 2023 05:00) (69 - 89)  RR: 15 (06 Oct 2023 05:00) (14 - 35)  SpO2: 95% (06 Oct 2023 05:00) (92% - 98%)    O2 Parameters below as of 06 Oct 2023 05:00  Patient On (Oxygen Delivery Method): nasal cannula, high flow  O2 Flow (L/min): 50  O2 Concentration (%): 50        General: ill looking  Lungs: Bilateral rhonchi  Cardiovascular: Regular   Abdomen: Soft, Positive BS  Extremities: No clubbing   Neurological: Non focal       10-04-23 @ 07:01  -  10-05-23 @ 07:00  --------------------------------------------------------  IN:    FentaNYL: 22.2 mL    IV PiggyBack: 150 mL    Jevity 1.2: 20 mL    Norepinephrine: 61.1 mL  Total IN: 253.3 mL    OUT:    Indwelling Catheter - Urethral (mL): 1790 mL  Total OUT: 1790 mL    Total NET: -1536.7 mL      10-05-23 @ 07:01  -  10-06-23 @ 06:07  --------------------------------------------------------  IN:    IV PiggyBack: 200 mL  Total IN: 200 mL    OUT:    Indwelling Catheter - Urethral (mL): 1640 mL    Norepinephrine: 0 mL  Total OUT: 1640 mL    Total NET: -1440 mL          LABS:                          10.1   4.53  )-----------( 282      ( 06 Oct 2023 04:15 )             32.5                                               10-05    137  |  98  |  5<L>  ----------------------------<  84  4.1   |  27  |  0.6<L>    Ca    8.2<L>      05 Oct 2023 09:02  Mg     2.1     10-05    TPro  5.4<L>  /  Alb  2.8<L>  /  TBili  0.4  /  DBili  x   /  AST  32  /  ALT  27  /  AlkPhos  126<H>  10-05                                             Urinalysis Basic - ( 05 Oct 2023 09:02 )    Color: x / Appearance: x / SG: x / pH: x  Gluc: 84 mg/dL / Ketone: x  / Bili: x / Urobili: x   Blood: x / Protein: x / Nitrite: x   Leuk Esterase: x / RBC: x / WBC x   Sq Epi: x / Non Sq Epi: x / Bacteria: x                                                  LIVER FUNCTIONS - ( 05 Oct 2023 09:02 )  Alb: 2.8 g/dL / Pro: 5.4 g/dL / ALK PHOS: 126 U/L / ALT: 27 U/L / AST: 32 U/L / GGT: x                                                  Culture - Sputum (collected 04 Oct 2023 12:00)  Source: Trach Asp Tracheal Aspirate  Gram Stain (04 Oct 2023 22:58):    Few polymorphonuclear leukocytes per low power field    Rare Squamous epithelial cells per low power field    No organisms seen per oil power field  Preliminary Report (05 Oct 2023 17:49):    No growth                                                                                       ABG - ( 04 Oct 2023 13:52 )  pH, Arterial: 7.46  pH, Blood: x     /  pCO2: 49    /  pO2: 114   / HCO3: 35    / Base Excess: 9.4   /  SaO2: 99.0                MEDICATIONS  (STANDING):  chlorhexidine 2% Cloths 1 Application(s) Topical <User Schedule>  gabapentin 600 milliGRAM(s) Oral daily  heparin   Injectable 5000 Unit(s) SubCutaneous every 12 hours  levoFLOXacin IVPB 750 milliGRAM(s) IV Intermittent every 24 hours  meropenem  IVPB 1000 milliGRAM(s) IV Intermittent every 8 hours  midodrine 10 milliGRAM(s) Oral every 8 hours  norepinephrine Infusion 0.05 MICROgram(s)/kG/Min (5.18 mL/Hr) IV Continuous <Continuous>  pantoprazole  Injectable 40 milliGRAM(s) IV Push two times a day  polyethylene glycol 3350 17 Gram(s) Oral two times a day  senna 2 Tablet(s) Oral at bedtime    MEDICATIONS  (PRN):  acetaminophen   Oral Liquid .. 650 milliGRAM(s) Enteral Tube every 6 hours PRN Temp greater or equal to 38C (100.4F)      cxr reviewed

## 2023-10-06 NOTE — SWALLOW BEDSIDE ASSESSMENT ADULT - NS SPL SWALLOW CLINIC TRIAL FT
+toleration of puree and mildly thick liquids w/o overt s/s of penetration/aspiration
concern for pharyngeal impairment

## 2023-10-06 NOTE — PROGRESS NOTE ADULT - ASSESSMENT
· Assessment	  58 year old female with a PMHx of Down syndrome, nonverbal at baseline, hypothyroidism, cerebral palsy, congenital pulmonary stenosis presents to the ED for hemoptysis.    ID is consulted for PNA  Recently admitted for UTI with ESBL E. coli and asp PNA, completed 7 days of meropenem  Febrile to 100.4 on admission, with leukocytosis  Lactic acidosis  NELA  9/29 BCx corynebacterium, Peptoniphilus > not true pathogens  9/29 UCx NG  CT showed new foci of extraluminal air along posterior wall of third portion   duodenum, most consistent with perforated ulcer; Bilateral lower lobe and right middle lobe multifocal pneumonia.  10/2 CT bibasilar opacities. Retroperitoneal gas resolved    Antibiotics:  Meropenem 9/29 - 9/30  Azithromycin 9/29  Clindamycin 9/29      IMPRESSION:  Multifocal pneumonia, likely aspiration with CXR with RLL bacterial PNA  10/4 Sputum NG  10/2 ET : mixed daniele  Nares ORSA NG  Legionella NG  WBC 4.5  CR improved    Duodenal perforation  Transaminitis, hepatocellular      RECOMMENDATIONS:  -no change in ABx  -f/u ET cultures  - Change IV meropenem 1gm q8hrs for total 8 days    -Off loading to prevent pressure sores and preventive measures to avoid aspiration   Please do not hesitate to recall ID if any questions arise either through [employer] [email or web address] or through [employer]

## 2023-10-06 NOTE — CHART NOTE - NSCHARTNOTEFT_GEN_A_CORE
Registered Dietitian Follow-Up     Patient Profile Reviewed                           Yes [x]   No []     Nutrition History Previously Obtained        Yes []  No [x]       Pertinent Subjective Information: observed 75% tray intake this AM.      Pertinent Medical Interventions:  Pt continues to be managed for Acute hypoxemic respiratory failure on 40%, Suspected upper GI bleed s/p 2 units pRBC, Possible duodenal perforation sp surgical evaluation, aspiration pneumonia, polymicrobial bacteremia, NSTEMI likely type II.     Diet order: Diet, Pureed:   Mildly Thick Liquids (MILDTHICKLIQS) (10-05-23 @ 15:32) [Active]    Anthropometrics:  Height (cm): 145 (10-02-23 @ 12:00)  Weight (kg): 55.3 (10-02-23 @ 12:00)  BMI (kg/m2): 26.3 (10-02-23 @ 12:00)  IBW: 34.1 kg  UBW: 49.9 kg from 23 per EMR    Daily Weight in k.4 (10-06), Weight in k.7 (10-05), Weight in k.2 (10-04), Weight in k.6 (10-03), Weight in k.7 (10-02), Weight in k.2 (10-01), Weight in k.2 ()  Weight Change: trending up likely reflective of fluid retention     MEDICATIONS  (STANDING):  bacitracin   Ointment 1 Application(s) Topical two times a day  chlorhexidine 2% Cloths 1 Application(s) Topical <User Schedule>  gabapentin 600 milliGRAM(s) Oral daily  heparin   Injectable 5000 Unit(s) SubCutaneous every 12 hours  levoFLOXacin IVPB 750 milliGRAM(s) IV Intermittent every 24 hours  meropenem  IVPB 1000 milliGRAM(s) IV Intermittent every 8 hours  midodrine 10 milliGRAM(s) Oral every 8 hours  norepinephrine Infusion 0.05 MICROgram(s)/kG/Min (5.18 mL/Hr) IV Continuous <Continuous>  pantoprazole  Injectable 40 milliGRAM(s) IV Push two times a day  polyethylene glycol 3350 17 Gram(s) Oral two times a day  senna 2 Tablet(s) Oral at bedtime    MEDICATIONS  (PRN):  acetaminophen   Oral Liquid .. 650 milliGRAM(s) Enteral Tube every 6 hours PRN Temp greater or equal to 38C (100.4F)    Pertinent Labs: 10- @ 04:15: Na 139, BUN 7<L>, Cr 0.6<L>, <H>, K+ 4.0, Phos 3.1, Mg 2.1, Alk Phos 101, ALT/SGPT 24, AST/SGOT 23, HbA1c --    Finger Sticks:    Physical Findings:  - Appearance:  - GI function:  - Tubes:  - Oral/Mouth cavity:  - Skin:  - Edema: WOCN 10/: Left hand serum filled blisters, Right plantar foot Deep Tissue Injury with Progression (two ulcerations noted), Bilateral Buttock Moisture Associated Skin Damage     Nutrition Requirements:  Weight Used: dosing weight 55.3 kg     Estimated Energy Needs    Continue []  Adjust [x]  Energy Recommendations: kcal/day -    Estimated Protein Needs    Continue [x]  Adjust []  Protein Recommendations: 72-88 g/day (1.3-1.6 g/kg)     Estimated Fluid Needs        Continue [x]  Adjust []  Fluid Recommendations: 9643-6223 mL/day - 25-30ml/kg     Nutrient Intake:    [x] Previous Nutrition Diagnosis: Inadequate Energy Intake            [x] Ongoing          [] Resolved     Nutrition Education: N/A     Goal/Expected Outcome: Pt to meet >90% & <105% estimated needs via EN in 3-5 days     Indicator/Monitoring: Monitor diet order, kcal intake, body composition, NFPE, labs  (lytes, BG, renal indices)    Recommendation: Registered Dietitian Follow-Up     Patient Profile Reviewed                           Yes [x]   No []     Nutrition History Previously Obtained        Yes []  No [x]       Pertinent Subjective Information: observed 75% tray intake this AM.      Pertinent Medical Interventions:  Pt continues to be managed for Acute hypoxemic respiratory failure on 40%, Suspected upper GI bleed s/p 2 units pRBC, Possible duodenal perforation sp surgical evaluation, aspiration pneumonia, polymicrobial bacteremia, NSTEMI likely type II.     Diet order: Diet, Pureed:   Mildly Thick Liquids (MILDTHICKLIQS) (10-05-23 @ 15:32) [Active]    Anthropometrics:  Height (cm): 145 (10-02-23 @ 12:00)  Weight (kg): 55.3 (10-02-23 @ 12:00)  BMI (kg/m2): 26.3 (10-02-23 @ 12:00)  IBW: 34.1 kg  UBW: 49.9 kg from 23 per EMR    Daily Weight in k.4 (10-06), Weight in k.7 (10-05), Weight in k.2 (10-04), Weight in k.6 (10-03), Weight in k.7 (10-02), Weight in k.2 (10-01), Weight in k.2 ()  Weight Change: trending up likely reflective of fluid retention     MEDICATIONS  (STANDING):  bacitracin   Ointment 1 Application(s) Topical two times a day  chlorhexidine 2% Cloths 1 Application(s) Topical <User Schedule>  gabapentin 600 milliGRAM(s) Oral daily  heparin   Injectable 5000 Unit(s) SubCutaneous every 12 hours  levoFLOXacin IVPB 750 milliGRAM(s) IV Intermittent every 24 hours  meropenem  IVPB 1000 milliGRAM(s) IV Intermittent every 8 hours  midodrine 10 milliGRAM(s) Oral every 8 hours  norepinephrine Infusion 0.05 MICROgram(s)/kG/Min (5.18 mL/Hr) IV Continuous <Continuous>  pantoprazole  Injectable 40 milliGRAM(s) IV Push two times a day  polyethylene glycol 3350 17 Gram(s) Oral two times a day  senna 2 Tablet(s) Oral at bedtime    MEDICATIONS  (PRN):  acetaminophen   Oral Liquid .. 650 milliGRAM(s) Enteral Tube every 6 hours PRN Temp greater or equal to 38C (100.4F)    Pertinent Labs:   10-06 @ 04:15: Na 139, BUN 7<L>, Cr 0.6<L>, <H>, K+ 4.0, Phos 3.1, Mg 2.1, Alk Phos 101, ALT/SGPT 24, AST/SGOT 23, HbA1c --    Finger Sticks:    Physical Findings:  - Appearance: alert, non-verbal at baseline  - GI function: abdomen WDL; last bowel movement 06-Oct-2023  - Tubes: no feeding tube  - Oral/Mouth cavity: S+S 10/5 FEES: Puree/mildly thick  - Skin: WOCN 10/2: Left hand serum filled blisters, Right plantar foot Deep Tissue Injury with Progression (two ulcerations noted), Bilateral Buttock Moisture Associated Skin Damage  - Edema: 1+ edema to left hand; right hand     Nutrition Requirements:  Weight Used: dosing weight 55.3 kg     Estimated Energy Needs    Continue []  Adjust [x]  Energy Recommendations: 0371-7602 kcal/day - MSJ 1013*SF 1.2-1.3    Estimated Protein Needs    Continue [x]  Adjust []  Protein Recommendations: 72-88 g/day (1.3-1.6 g/kg)     Estimated Fluid Needs        Continue [x]  Adjust []  Fluid Recommendations: 8030-6106 mL/day - 25-30ml/kg     Nutrient Intake: Current tray intake meeting >50-75% estimated needs    [x] Previous Nutrition Diagnosis: Inadequate Energy Intake            [x] Ongoing          [] Resolved     Nutrition Education: N/A     Goal/Expected Outcome: Pt to meet >90% & <105% estimated needs via EN in 3-5 days     Indicator/Monitoring: Monitor diet order, kcal intake, body composition, NFPE, labs  (lytes, BG, renal indices)    Recommendation:  Cont with regular diet, consistency/texture per SLP  If tray intake <75%, can add Magic cup (290kcal, 9g protein each)     Patient assessed at moderate nutrition risk; RD to follow in 5-7 days.   RD spectra [phone], also available on Teams.

## 2023-10-06 NOTE — PROGRESS NOTE ADULT - ASSESSMENT
IMPRESSION:    Acute hypoxemic respiratory failure  on HHFNC  Suspected upper GI bleed s/p 2 units pRBC  Possible duodenal perforation sp surgical/ GI eval no intervention  aspiration pneumonia  polymicrobial bacteremia   NSTEMI likely type II   NELA likely prerenal resolved  H/o CP  H/o seizures    PLAN:    CNS: Avoid CNS depressant    HEENT: Oral care    PULMONARY: HOB at 45 degrees.  aspiration precaution, keep SaO2 92 TO 96%, on HHFNC    CARDIOVASCULAR: ECHO noted.  Midodrine    GI: feeding Protonix BID.  speech eval    RENAL:  Voiding trial Bladder scans.  FU lytes.  Correct as needed.      INFECTIOUS DISEASE:  abx per ID    HEMATOLOGICAL: DVT prophylaxis seq.  Monitor CBC.      ENDOCRINE:  Follow up FS.  Insulin protocol if needed.    MUSCULOSKELETAL: Bedrest.  Off loading.      Prognosis guarded.      barlow for retention  multiple skin ulcers  palliative care fup

## 2023-10-07 LAB
ALBUMIN SERPL ELPH-MCNC: 2.8 G/DL — LOW (ref 3.5–5.2)
ALP SERPL-CCNC: 88 U/L — SIGNIFICANT CHANGE UP (ref 30–115)
ALT FLD-CCNC: 20 U/L — SIGNIFICANT CHANGE UP (ref 0–41)
ANION GAP SERPL CALC-SCNC: 10 MMOL/L — SIGNIFICANT CHANGE UP (ref 7–14)
AST SERPL-CCNC: 21 U/L — SIGNIFICANT CHANGE UP (ref 0–41)
BASOPHILS # BLD AUTO: 0.08 K/UL — SIGNIFICANT CHANGE UP (ref 0–0.2)
BASOPHILS NFR BLD AUTO: 2 % — HIGH (ref 0–1)
BILIRUB SERPL-MCNC: 0.3 MG/DL — SIGNIFICANT CHANGE UP (ref 0.2–1.2)
BUN SERPL-MCNC: 8 MG/DL — LOW (ref 10–20)
CALCIUM SERPL-MCNC: 8.2 MG/DL — LOW (ref 8.4–10.5)
CHLORIDE SERPL-SCNC: 102 MMOL/L — SIGNIFICANT CHANGE UP (ref 98–110)
CO2 SERPL-SCNC: 28 MMOL/L — SIGNIFICANT CHANGE UP (ref 17–32)
CREAT SERPL-MCNC: 0.7 MG/DL — SIGNIFICANT CHANGE UP (ref 0.7–1.5)
EGFR: 101 ML/MIN/1.73M2 — SIGNIFICANT CHANGE UP
EOSINOPHIL # BLD AUTO: 0.25 K/UL — SIGNIFICANT CHANGE UP (ref 0–0.7)
EOSINOPHIL NFR BLD AUTO: 6.2 % — SIGNIFICANT CHANGE UP (ref 0–8)
GLUCOSE SERPL-MCNC: 105 MG/DL — HIGH (ref 70–99)
HCT VFR BLD CALC: 33.5 % — LOW (ref 37–47)
HGB BLD-MCNC: 10.3 G/DL — LOW (ref 12–16)
IMM GRANULOCYTES NFR BLD AUTO: 0.5 % — HIGH (ref 0.1–0.3)
LYMPHOCYTES # BLD AUTO: 1.02 K/UL — LOW (ref 1.2–3.4)
LYMPHOCYTES # BLD AUTO: 25.2 % — SIGNIFICANT CHANGE UP (ref 20.5–51.1)
MAGNESIUM SERPL-MCNC: 2.2 MG/DL — SIGNIFICANT CHANGE UP (ref 1.8–2.4)
MCHC RBC-ENTMCNC: 22.7 PG — LOW (ref 27–31)
MCHC RBC-ENTMCNC: 30.7 G/DL — LOW (ref 32–37)
MCV RBC AUTO: 74 FL — LOW (ref 81–99)
MONOCYTES # BLD AUTO: 0.49 K/UL — SIGNIFICANT CHANGE UP (ref 0.1–0.6)
MONOCYTES NFR BLD AUTO: 12.1 % — HIGH (ref 1.7–9.3)
NEUTROPHILS # BLD AUTO: 2.19 K/UL — SIGNIFICANT CHANGE UP (ref 1.4–6.5)
NEUTROPHILS NFR BLD AUTO: 54 % — SIGNIFICANT CHANGE UP (ref 42.2–75.2)
NRBC # BLD: 0 /100 WBCS — SIGNIFICANT CHANGE UP (ref 0–0)
PHOSPHATE SERPL-MCNC: 3.3 MG/DL — SIGNIFICANT CHANGE UP (ref 2.1–4.9)
PLATELET # BLD AUTO: 341 K/UL — SIGNIFICANT CHANGE UP (ref 130–400)
PMV BLD: 10.2 FL — SIGNIFICANT CHANGE UP (ref 7.4–10.4)
POTASSIUM SERPL-MCNC: 3.9 MMOL/L — SIGNIFICANT CHANGE UP (ref 3.5–5)
POTASSIUM SERPL-SCNC: 3.9 MMOL/L — SIGNIFICANT CHANGE UP (ref 3.5–5)
PROT SERPL-MCNC: 5.6 G/DL — LOW (ref 6–8)
RBC # BLD: 4.53 M/UL — SIGNIFICANT CHANGE UP (ref 4.2–5.4)
RBC # FLD: 22.1 % — HIGH (ref 11.5–14.5)
SODIUM SERPL-SCNC: 140 MMOL/L — SIGNIFICANT CHANGE UP (ref 135–146)
WBC # BLD: 4.05 K/UL — LOW (ref 4.8–10.8)
WBC # FLD AUTO: 4.05 K/UL — LOW (ref 4.8–10.8)

## 2023-10-07 PROCEDURE — 99291 CRITICAL CARE FIRST HOUR: CPT

## 2023-10-07 RX ADMIN — MEROPENEM 100 MILLIGRAM(S): 1 INJECTION INTRAVENOUS at 21:15

## 2023-10-07 RX ADMIN — POLYETHYLENE GLYCOL 3350 17 GRAM(S): 17 POWDER, FOR SOLUTION ORAL at 05:44

## 2023-10-07 RX ADMIN — MIDODRINE HYDROCHLORIDE 10 MILLIGRAM(S): 2.5 TABLET ORAL at 05:44

## 2023-10-07 RX ADMIN — MIDODRINE HYDROCHLORIDE 10 MILLIGRAM(S): 2.5 TABLET ORAL at 14:08

## 2023-10-07 RX ADMIN — MEROPENEM 100 MILLIGRAM(S): 1 INJECTION INTRAVENOUS at 14:08

## 2023-10-07 RX ADMIN — SENNA PLUS 2 TABLET(S): 8.6 TABLET ORAL at 21:15

## 2023-10-07 RX ADMIN — Medication 1 APPLICATION(S): at 05:44

## 2023-10-07 RX ADMIN — GABAPENTIN 600 MILLIGRAM(S): 400 CAPSULE ORAL at 11:57

## 2023-10-07 RX ADMIN — MIDODRINE HYDROCHLORIDE 10 MILLIGRAM(S): 2.5 TABLET ORAL at 21:15

## 2023-10-07 RX ADMIN — PANTOPRAZOLE SODIUM 40 MILLIGRAM(S): 20 TABLET, DELAYED RELEASE ORAL at 05:45

## 2023-10-07 RX ADMIN — PANTOPRAZOLE SODIUM 40 MILLIGRAM(S): 20 TABLET, DELAYED RELEASE ORAL at 17:20

## 2023-10-07 RX ADMIN — HEPARIN SODIUM 5000 UNIT(S): 5000 INJECTION INTRAVENOUS; SUBCUTANEOUS at 17:20

## 2023-10-07 RX ADMIN — Medication 1 APPLICATION(S): at 17:35

## 2023-10-07 RX ADMIN — MEROPENEM 100 MILLIGRAM(S): 1 INJECTION INTRAVENOUS at 05:44

## 2023-10-07 RX ADMIN — CHLORHEXIDINE GLUCONATE 1 APPLICATION(S): 213 SOLUTION TOPICAL at 06:00

## 2023-10-07 RX ADMIN — HEPARIN SODIUM 5000 UNIT(S): 5000 INJECTION INTRAVENOUS; SUBCUTANEOUS at 05:44

## 2023-10-07 NOTE — PROGRESS NOTE ADULT - SUBJECTIVE AND OBJECTIVE BOX
24H events:    Patient is a 58y old Female who presents with a chief complaint of Upper GI bleed (06 Oct 2023 15:14)    Primary diagnosis of GI bleed      Today is hospital day 8d. This morning patient was seen and examined at bedside, resting comfortably in bed.    No acute or major events overnight.      PAST MEDICAL & SURGICAL HISTORY  Down syndrome    Osteoporosis    Mild anemia    Neuropathy    S/P debridement  of R hip on 3/2/21      SOCIAL HISTORY:  Social History:      ALLERGIES:  No Known Allergies    MEDICATIONS:  STANDING MEDICATIONS  bacitracin   Ointment 1 Application(s) Topical two times a day  chlorhexidine 2% Cloths 1 Application(s) Topical <User Schedule>  gabapentin 600 milliGRAM(s) Oral daily  heparin   Injectable 5000 Unit(s) SubCutaneous every 12 hours  levoFLOXacin IVPB 750 milliGRAM(s) IV Intermittent every 24 hours  meropenem  IVPB 1000 milliGRAM(s) IV Intermittent every 8 hours  midodrine 10 milliGRAM(s) Oral every 8 hours  norepinephrine Infusion 0.05 MICROgram(s)/kG/Min IV Continuous <Continuous>  pantoprazole  Injectable 40 milliGRAM(s) IV Push two times a day  polyethylene glycol 3350 17 Gram(s) Oral two times a day  senna 2 Tablet(s) Oral at bedtime    PRN MEDICATIONS  acetaminophen     Tablet .. 650 milliGRAM(s) Oral every 6 hours PRN    VITALS:   T(F): 99.4  HR: 68  BP: 104/54  RR: 13  SpO2: 98%    PHYSICAL EXAM:  GENERAL:   ( x ) NAD, lying in bed comfortably     (  ) obtunded     (  ) lethargic     (  ) somnolent    HEAD:   ( x ) Atraumatic     (  ) hematoma     (  ) laceration (specify location:       )     NECK:  ( x ) Supple     (  ) neck stiffness     (  ) nuchal rigidity     (  )  no JVD     (  ) JVD present ( -- cm)    HEART:  Rate -->     ( x ) normal rate     (  ) bradycardic     (  ) tachycardic  Rhythm -->     ( x ) regular     (  ) regularly irregular     (  ) irregularly irregular  Murmurs -->     (x  ) normal s1s2     (  ) systolic murmur     (  ) diastolic murmur     (  ) continuous murmur      (  ) S3 present     (  ) S4 present    LUNGS:   (x )Unlabored respirations     (  ) tachypnea  (x ) B/L air entry     (  ) decreased breath sounds in:  (location     )    (x  ) no adventitious sound     (  ) crackles     (  ) wheezing      (  ) rhonchi      (specify location:       )  (  ) chest wall tenderness (specify location:       )    ABDOMEN:   (x  ) Soft     (  ) tense   |   ( x ) nondistended     (  ) distended   |   (x  ) +BS     (  ) hypoactive bowel sounds     (  ) hyperactive bowel sounds  ( x) nontender     (  ) RUQ tenderness     (  ) RLQ tenderness     (  ) LLQ tenderness     (  ) epigastric tenderness     (  ) diffuse tenderness  (  ) Splenomegaly      (  ) Hepatomegaly      (  ) Jaundice     (  ) ecchymosis     EXTREMITIES:  (x  ) Normal     (  ) Rash     (  ) ecchymosis     (  ) varicose veins      (  ) pitting edema     (  ) non-pitting edema   (  ) ulceration     (  ) gangrene:     (location:     )    NERVOUS SYSTEM:    Patient has down syndrome further  neuro assessment could not be done   SKIN:   ( x ) No rashes or lesions     (  ) maculopapular rash     (  ) pustules     (  ) vesicles     (  ) ulcer     (  ) ecchymosis     (specify location:     )      LABS:                        10.1   4.53  )-----------( 282      ( 06 Oct 2023 04:15 )             32.5     10-06    139  |  101  |  7<L>  ----------------------------<  109<H>  4.0   |  30  |  0.6<L>    Ca    8.5      06 Oct 2023 04:15  Phos  3.1     10-06  Mg     2.1     10-06    TPro  5.4<L>  /  Alb  2.8<L>  /  TBili  0.3  /  DBili  x   /  AST  23  /  ALT  24  /  AlkPhos  101  10-06      Urinalysis Basic - ( 06 Oct 2023 04:15 )    Color: x / Appearance: x / SG: x / pH: x  Gluc: 109 mg/dL / Ketone: x  / Bili: x / Urobili: x   Blood: x / Protein: x / Nitrite: x   Leuk Esterase: x / RBC: x / WBC x   Sq Epi: x / Non Sq Epi: x / Bacteria: x            Culture - Sputum (collected 04 Oct 2023 12:00)  Source: Trach Asp Tracheal Aspirate  Gram Stain (04 Oct 2023 22:58):    Few polymorphonuclear leukocytes per low power field    Rare Squamous epithelial cells per low power field    No organisms seen per oil power field  Final Report (06 Oct 2023 10:31):    No growth          RADIOLOGY:  chest xray :    Stable bilateral opacities.

## 2023-10-07 NOTE — PROGRESS NOTE ADULT - ASSESSMENT
IMPRESSION:    Acute hypoxemic respiratory failure  on HHFNC  Suspected upper GI bleed s/p 2 units pRBC  Possible duodenal perforation sp surgical/ GI eval no intervention  aspiration pneumonia  polymicrobial bacteremia   NSTEMI likely type II   NELA likely prerenal resolved  H/o CP  H/o seizures    PLAN:    CNS: Avoid CNS depressant    HEENT: Oral care    PULMONARY: HOB at 45 degrees.  aspiration precaution, keep SaO2 92 TO 96%, on HHFNC    CARDIOVASCULAR: ECHO noted.  Midodrine    GI: feeding Protonix BID.  feeding per speech    RENAL:    FU lytes.  Correct as needed.      INFECTIOUS DISEASE:  abx per ID, procal, RVP    HEMATOLOGICAL: DVT prophylaxis seq.  Monitor CBC.      ENDOCRINE:  Follow up FS.  Insulin protocol if needed.    MUSCULOSKELETAL: Bedrest.  Off loading.      Prognosis guarded.      barlow for retention  multiple skin ulcers  palliative care fup

## 2023-10-07 NOTE — PHARMACOTHERAPY INTERVENTION NOTE - COMMENTS
SCr 0.5-recommended changing meropenem 1g IV q8h & levofloxacin 750mg IV daily
recommended adding bowel regimen, pt on fentanyl drip
-K 3.6-d/w team, KLor 20meq og [id number]  -on fentanyl drip, recommended adding senna 2 tabs og hs, pt on Miralax q12h no BM
pt off levophed 10/4-d/c levophed from med profile

## 2023-10-07 NOTE — PROGRESS NOTE ADULT - SUBJECTIVE AND OBJECTIVE BOX
Over Night Events: events noted, remain critically ill, on HHFNC 50/50, low grade fever    PHYSICAL EXAM    ICU Vital Signs Last 24 Hrs  T(C): 37.4 (07 Oct 2023 04:00), Max: 38 (06 Oct 2023 20:00)  T(F): 99.3 (07 Oct 2023 04:00), Max: 100.4 (06 Oct 2023 20:00)  HR: 61 (07 Oct 2023 07:00) (56 - 129)  BP: 89/45 (07 Oct 2023 07:00) (89/45 - 135/81)  BP(mean): 59 (07 Oct 2023 07:00) (59 - 91)  RR: 17 (07 Oct 2023 07:00) (8 - 36)  SpO2: 95% (07 Oct 2023 07:00) (92% - 99%)    O2 Parameters below as of 07 Oct 2023 07:00  Patient On (Oxygen Delivery Method): nasal cannula, high flow  O2 Flow (L/min): 50  O2 Concentration (%): 50        General: ill looking  Lungs: dec bs both bases  Cardiovascular: Regular   Abdomen: Soft, Positive BS  Extremities: No clubbing   not following commands      10-06-23 @ 07:01  -  10-07-23 @ 07:00  --------------------------------------------------------  IN:    IV PiggyBack: 100 mL    Oral Fluid: 410 mL  Total IN: 510 mL    OUT:    Indwelling Catheter - Urethral (mL): 465 mL    Voided (mL): 700 mL  Total OUT: 1165 mL    Total NET: -655 mL          LABS:                          10.3   4.05  )-----------( 341      ( 07 Oct 2023 04:33 )             33.5                                               10-07    140  |  102  |  8<L>  ----------------------------<  105<H>  3.9   |  28  |  0.7    Ca    8.2<L>      07 Oct 2023 04:33  Phos  3.3     10-07  Mg     2.2     10-07    TPro  5.6<L>  /  Alb  2.8<L>  /  TBili  0.3  /  DBili  x   /  AST  21  /  ALT  20  /  AlkPhos  88  10-07                                             Urinalysis Basic - ( 07 Oct 2023 04:33 )    Color: x / Appearance: x / SG: x / pH: x  Gluc: 105 mg/dL / Ketone: x  / Bili: x / Urobili: x   Blood: x / Protein: x / Nitrite: x   Leuk Esterase: x / RBC: x / WBC x   Sq Epi: x / Non Sq Epi: x / Bacteria: x                                                  LIVER FUNCTIONS - ( 07 Oct 2023 04:33 )  Alb: 2.8 g/dL / Pro: 5.6 g/dL / ALK PHOS: 88 U/L / ALT: 20 U/L / AST: 21 U/L / GGT: x                                                  Culture - Sputum (collected 04 Oct 2023 12:00)  Source: Trach Asp Tracheal Aspirate  Gram Stain (04 Oct 2023 22:58):    Few polymorphonuclear leukocytes per low power field    Rare Squamous epithelial cells per low power field    No organisms seen per oil power field  Final Report (06 Oct 2023 10:31):    No growth                                                                                           MEDICATIONS  (STANDING):  bacitracin   Ointment 1 Application(s) Topical two times a day  chlorhexidine 2% Cloths 1 Application(s) Topical <User Schedule>  gabapentin 600 milliGRAM(s) Oral daily  heparin   Injectable 5000 Unit(s) SubCutaneous every 12 hours  levoFLOXacin IVPB 750 milliGRAM(s) IV Intermittent every 24 hours  meropenem  IVPB 1000 milliGRAM(s) IV Intermittent every 8 hours  midodrine 10 milliGRAM(s) Oral every 8 hours  norepinephrine Infusion 0.05 MICROgram(s)/kG/Min (5.18 mL/Hr) IV Continuous <Continuous>  pantoprazole  Injectable 40 milliGRAM(s) IV Push two times a day  polyethylene glycol 3350 17 Gram(s) Oral two times a day  senna 2 Tablet(s) Oral at bedtime    MEDICATIONS  (PRN):  acetaminophen     Tablet .. 650 milliGRAM(s) Oral every 6 hours PRN Temp greater or equal to 38C (100.4F), Mild Pain (1 - 3)    cxr noted

## 2023-10-07 NOTE — PHARMACOTHERAPY INTERVENTION NOTE - INTERVENTION TYPE RECOOMEND
Therapy Recommended - Additional therapy
Therapy Recommended - Med Rec related
Therapy Recommended - Additional therapy

## 2023-10-07 NOTE — PROGRESS NOTE ADULT - ASSESSMENT
TEA ECHAVARRIA is a 58F with PMH of Down syndrome, cerebral palsy, severe intellectual disability, nonverbal at baseline, hypothyroidism, congenital pulmonary stenosis, chronic pressure ulcers, orthostatic hypotension, constipation, presents to the ED for coffee ground emesis and AMS. Patient was recently admitted to the hospital for management of pneumonia and resultant hypoxic respiratory failure. Patient admitted to MICU for presumptive upper GI bleed. Surgery consulted for CT findings of duodenal perforation.    #Hypovolemic and septic shock  #Acute upper GI bleed s/p 2 units pRBC  #Possible perforation sp surgical evaluation  - Hb 9.9, s/p 2u pRBC, Hb 14.7 > 12 -> 10.1-> 9.5 -> 9.2 ---> 10.8, stable   - Surgery: no plans for any surgical intervention. Pt is tolerated feeds  - CTAP w/ oral and IV contrast: No contrast extravasation, Previous seen foci of retroperitoneal gas no longer identified.  - Started Minodrine 10 mg q8 10/4 and off levophed overnight   - Lactate trend 2.4 -> 2.00 -> 1.5, 1.3 -> 1.0   - c/w protonix BID for at least 8 weeks and then once daily   - Miralax BID with senna to have atleast 1-2 BMs per day    #Pneumonia, in the setting of recent hospitalization  #Likely aspiration  #Acute hypoxemic respiratory failure   - CXR b/l opacities  - Bronchoscopy today 10/4  - Passed SBT, extubated  - Off pressors since 2 am   - ID recs appreciated   - On IV meropenem 1 g q12 and IV Levaquin 750 mg q48h  - F/u Sputum Cx, NG on 10/2  - MRSA negative  - wean down FiO2 for a saturation above 92%    #NELA likely prerenal, resolved  - Cr 1.6 > 1.2 > 1.1  - voiding trial  - Madsen still patent, placed 10/3,     #NSTEMI likely type II  - EKG Sinus tachycardia, ST elevation, consider early repolarization  - Echo results 9/30 EF 55 to 60 %, Normal left systolic function Diastolic function could not be assessed.   - Trend trop: 0.34 -> 0.11     #Left hand Edema   - Continue with burn rec    - DVT ppx: Heparin  - Diet: NPO  - Activity: Bedrest      #Handoff:    - Soft feeds   - tachycardia resolved  - NPO till pass Speech and Swallow Eval

## 2023-10-08 LAB
ALBUMIN SERPL ELPH-MCNC: 2.5 G/DL — LOW (ref 3.5–5.2)
ALP SERPL-CCNC: 77 U/L — SIGNIFICANT CHANGE UP (ref 30–115)
ALT FLD-CCNC: 20 U/L — SIGNIFICANT CHANGE UP (ref 0–41)
ANION GAP SERPL CALC-SCNC: 9 MMOL/L — SIGNIFICANT CHANGE UP (ref 7–14)
AST SERPL-CCNC: 23 U/L — SIGNIFICANT CHANGE UP (ref 0–41)
BASOPHILS # BLD AUTO: 0.1 K/UL — SIGNIFICANT CHANGE UP (ref 0–0.2)
BASOPHILS NFR BLD AUTO: 2 % — HIGH (ref 0–1)
BILIRUB SERPL-MCNC: 0.2 MG/DL — SIGNIFICANT CHANGE UP (ref 0.2–1.2)
BUN SERPL-MCNC: 9 MG/DL — LOW (ref 10–20)
CALCIUM SERPL-MCNC: 8.4 MG/DL — SIGNIFICANT CHANGE UP (ref 8.4–10.4)
CHLORIDE SERPL-SCNC: 103 MMOL/L — SIGNIFICANT CHANGE UP (ref 98–110)
CO2 SERPL-SCNC: 28 MMOL/L — SIGNIFICANT CHANGE UP (ref 17–32)
CREAT SERPL-MCNC: 0.6 MG/DL — LOW (ref 0.7–1.5)
EGFR: 105 ML/MIN/1.73M2 — SIGNIFICANT CHANGE UP
EOSINOPHIL # BLD AUTO: 0.29 K/UL — SIGNIFICANT CHANGE UP (ref 0–0.7)
EOSINOPHIL NFR BLD AUTO: 5.9 % — SIGNIFICANT CHANGE UP (ref 0–8)
GLUCOSE SERPL-MCNC: 92 MG/DL — SIGNIFICANT CHANGE UP (ref 70–99)
HCT VFR BLD CALC: 35.7 % — LOW (ref 37–47)
HGB BLD-MCNC: 11 G/DL — LOW (ref 12–16)
IMM GRANULOCYTES NFR BLD AUTO: 0.2 % — SIGNIFICANT CHANGE UP (ref 0.1–0.3)
LYMPHOCYTES # BLD AUTO: 1.34 K/UL — SIGNIFICANT CHANGE UP (ref 1.2–3.4)
LYMPHOCYTES # BLD AUTO: 27.1 % — SIGNIFICANT CHANGE UP (ref 20.5–51.1)
MAGNESIUM SERPL-MCNC: 2.2 MG/DL — SIGNIFICANT CHANGE UP (ref 1.8–2.4)
MCHC RBC-ENTMCNC: 23.6 PG — LOW (ref 27–31)
MCHC RBC-ENTMCNC: 30.8 G/DL — LOW (ref 32–37)
MCV RBC AUTO: 76.4 FL — LOW (ref 81–99)
MONOCYTES # BLD AUTO: 0.63 K/UL — HIGH (ref 0.1–0.6)
MONOCYTES NFR BLD AUTO: 12.8 % — HIGH (ref 1.7–9.3)
NEUTROPHILS # BLD AUTO: 2.57 K/UL — SIGNIFICANT CHANGE UP (ref 1.4–6.5)
NEUTROPHILS NFR BLD AUTO: 52 % — SIGNIFICANT CHANGE UP (ref 42.2–75.2)
NRBC # BLD: 0 /100 WBCS — SIGNIFICANT CHANGE UP (ref 0–0)
PHOSPHATE SERPL-MCNC: 3.4 MG/DL — SIGNIFICANT CHANGE UP (ref 2.1–4.9)
PLATELET # BLD AUTO: 358 K/UL — SIGNIFICANT CHANGE UP (ref 130–400)
PMV BLD: 10 FL — SIGNIFICANT CHANGE UP (ref 7.4–10.4)
POTASSIUM SERPL-MCNC: 4.2 MMOL/L — SIGNIFICANT CHANGE UP (ref 3.5–5)
POTASSIUM SERPL-SCNC: 4.2 MMOL/L — SIGNIFICANT CHANGE UP (ref 3.5–5)
PROCALCITONIN SERPL-MCNC: 0.4 NG/ML — HIGH (ref 0.02–0.1)
PROT SERPL-MCNC: 5.6 G/DL — LOW (ref 6–8)
RBC # BLD: 4.67 M/UL — SIGNIFICANT CHANGE UP (ref 4.2–5.4)
RBC # FLD: 22.7 % — HIGH (ref 11.5–14.5)
SODIUM SERPL-SCNC: 140 MMOL/L — SIGNIFICANT CHANGE UP (ref 135–146)
WBC # BLD: 4.94 K/UL — SIGNIFICANT CHANGE UP (ref 4.8–10.8)
WBC # FLD AUTO: 4.94 K/UL — SIGNIFICANT CHANGE UP (ref 4.8–10.8)

## 2023-10-08 PROCEDURE — 99291 CRITICAL CARE FIRST HOUR: CPT

## 2023-10-08 PROCEDURE — 71045 X-RAY EXAM CHEST 1 VIEW: CPT | Mod: 26

## 2023-10-08 RX ORDER — MIDODRINE HYDROCHLORIDE 2.5 MG/1
10 TABLET ORAL ONCE
Refills: 0 | Status: COMPLETED | OUTPATIENT
Start: 2023-10-08 | End: 2023-10-08

## 2023-10-08 RX ORDER — SODIUM CHLORIDE 9 MG/ML
250 INJECTION INTRAMUSCULAR; INTRAVENOUS; SUBCUTANEOUS ONCE
Refills: 0 | Status: COMPLETED | OUTPATIENT
Start: 2023-10-08 | End: 2023-10-08

## 2023-10-08 RX ORDER — MIDODRINE HYDROCHLORIDE 2.5 MG/1
10 TABLET ORAL EVERY 8 HOURS
Refills: 0 | Status: DISCONTINUED | OUTPATIENT
Start: 2023-10-08 | End: 2023-10-08

## 2023-10-08 RX ORDER — SODIUM CHLORIDE 9 MG/ML
500 INJECTION, SOLUTION INTRAVENOUS
Refills: 0 | Status: DISCONTINUED | OUTPATIENT
Start: 2023-10-08 | End: 2023-10-11

## 2023-10-08 RX ADMIN — POLYETHYLENE GLYCOL 3350 17 GRAM(S): 17 POWDER, FOR SOLUTION ORAL at 05:35

## 2023-10-08 RX ADMIN — SENNA PLUS 2 TABLET(S): 8.6 TABLET ORAL at 21:11

## 2023-10-08 RX ADMIN — POLYETHYLENE GLYCOL 3350 17 GRAM(S): 17 POWDER, FOR SOLUTION ORAL at 17:04

## 2023-10-08 RX ADMIN — HEPARIN SODIUM 5000 UNIT(S): 5000 INJECTION INTRAVENOUS; SUBCUTANEOUS at 17:04

## 2023-10-08 RX ADMIN — MIDODRINE HYDROCHLORIDE 10 MILLIGRAM(S): 2.5 TABLET ORAL at 06:48

## 2023-10-08 RX ADMIN — Medication 1 APPLICATION(S): at 05:19

## 2023-10-08 RX ADMIN — CHLORHEXIDINE GLUCONATE 1 APPLICATION(S): 213 SOLUTION TOPICAL at 05:35

## 2023-10-08 RX ADMIN — HEPARIN SODIUM 5000 UNIT(S): 5000 INJECTION INTRAVENOUS; SUBCUTANEOUS at 05:35

## 2023-10-08 RX ADMIN — MIDODRINE HYDROCHLORIDE 10 MILLIGRAM(S): 2.5 TABLET ORAL at 14:00

## 2023-10-08 RX ADMIN — SODIUM CHLORIDE 125 MILLILITER(S): 9 INJECTION INTRAMUSCULAR; INTRAVENOUS; SUBCUTANEOUS at 04:15

## 2023-10-08 RX ADMIN — PANTOPRAZOLE SODIUM 40 MILLIGRAM(S): 20 TABLET, DELAYED RELEASE ORAL at 17:04

## 2023-10-08 RX ADMIN — PANTOPRAZOLE SODIUM 40 MILLIGRAM(S): 20 TABLET, DELAYED RELEASE ORAL at 05:36

## 2023-10-08 RX ADMIN — MEROPENEM 100 MILLIGRAM(S): 1 INJECTION INTRAVENOUS at 21:11

## 2023-10-08 RX ADMIN — GABAPENTIN 600 MILLIGRAM(S): 400 CAPSULE ORAL at 14:37

## 2023-10-08 RX ADMIN — MEROPENEM 100 MILLIGRAM(S): 1 INJECTION INTRAVENOUS at 14:00

## 2023-10-08 RX ADMIN — MIDODRINE HYDROCHLORIDE 10 MILLIGRAM(S): 2.5 TABLET ORAL at 21:11

## 2023-10-08 RX ADMIN — MIDODRINE HYDROCHLORIDE 10 MILLIGRAM(S): 2.5 TABLET ORAL at 04:16

## 2023-10-08 RX ADMIN — MEROPENEM 100 MILLIGRAM(S): 1 INJECTION INTRAVENOUS at 05:35

## 2023-10-08 RX ADMIN — SODIUM CHLORIDE 500 MILLILITER(S): 9 INJECTION, SOLUTION INTRAVENOUS at 10:45

## 2023-10-08 RX ADMIN — Medication 1 APPLICATION(S): at 17:04

## 2023-10-08 NOTE — PROGRESS NOTE ADULT - ASSESSMENT
TEA ECHAVARRIA is a 58F with PMH of Down syndrome, cerebral palsy, severe intellectual disability, nonverbal at baseline, hypothyroidism, congenital pulmonary stenosis, chronic pressure ulcers, orthostatic hypotension, constipation, presents to the ED for coffee ground emesis and AMS. Patient was recently admitted to the hospital for management of pneumonia and resultant hypoxic respiratory failure. Patient admitted to MICU for presumptive upper GI bleed. Surgery consulted for CT findings of duodenal perforation.    #Hypovolemic and septic shock  #Acute upper GI bleed s/p 2 units pRBC  #Possible perforation sp surgical evaluation  - Hb 9.9, s/p 2u pRBC, Hb 14.7 > 12 -> 10.1-> 9.5 -> 9.2 ---> 10.8, stable   - Surgery: no plans for any surgical intervention. Pt is tolerated feeds  - CTAP w/ oral and IV contrast: No contrast extravasation, Previous seen foci of retroperitoneal gas no longer identified.  - Started Minodrine 10 mg q8 10/4 and off levophed overnight   - Lactate trend 2.4 -> 2.00 -> 1.5, 1.3 -> 1.0   - CBC downtrending to 5.5 k   - c/w protonix BID for at least 8 weeks and then once daily   - Miralax BID with senna to have atleast 1-2 BMs per day    #Pneumonia, in the setting of recent hospitalization  #Likely aspiration  #Acute hypoxemic respiratory failure   - CXR b/l opacities  - Bronchoscopy today 10/4  - Passed SBT, extubated  - Off pressors since 2 am   - ID recs appreciated   - On IV meropenem 1 g q12 and IV Levaquin 750 mg q48h  - F/u Sputum Cx, NG on 10/2  - MRSA negative  - wean down FiO2 for a saturation above 92%    #NELA likely prerenal, resolved  - Cr 1.6 > 1.2 > 1.1  - voiding trial  - Madsen still patent, placed 10/3,     #NSTEMI likely type II  - EKG Sinus tachycardia, ST elevation, consider early repolarization  - Echo results 9/30 EF 55 to 60 %, Normal left systolic function Diastolic function could not be assessed.   - Trend trop: 0.34 -> 0.11     #Left hand Edema   - Continue with burn rec  - Improving 10/8    - DVT ppx: Heparin  - Diet: Soft mildly thickened  - Activity: Bedrest      #Handoff:    - Soft feeds   - tachycardia resolved

## 2023-10-08 NOTE — PROGRESS NOTE ADULT - ASSESSMENT
IMPRESSION:    Acute hypoxemic respiratory failure  on HHFNC sp extubation slowly improving  Suspected upper GI bleed s/p 2 units pRBC  Possible duodenal perforation sp surgical/ GI eval no intervention  aspiration pneumonia  polymicrobial bacteremia   NSTEMI likely type II   NELA likely prerenal resolved  H/o CP  H/o seizures    PLAN:    CNS: Avoid CNS depressant    HEENT: Oral care    PULMONARY: HOB at 45 degrees.  aspiration precaution, keep SaO2 92 TO 96%, on HHFNC    CARDIOVASCULAR: ECHO noted.  Midodrine    GI: feeding Protonix BID.  feeding per speech    RENAL:    FU lytes.  Correct as needed.      INFECTIOUS DISEASE:  abx per ID, procal, RVP    HEMATOLOGICAL: DVT prophylaxis seq.  Monitor CBC.      ENDOCRINE:  Follow up FS.  Insulin protocol if needed.    MUSCULOSKELETAL: Bedrest.  Off loading.      Prognosis guarded.      dispo SDU  multiple skin ulcers  palliative care fup

## 2023-10-08 NOTE — PROVIDER CONTACT NOTE (CHANGE IN STATUS NOTIFICATION) - SITUATION
Dr. Ambrosio made aware of hypotension, patient's BP 78/51. NICOM fluid challenge ordered and performed, patient is fluid responsive (17.7%), Additional 250 cc NS bolus ordered and given.

## 2023-10-08 NOTE — PROGRESS NOTE ADULT - SUBJECTIVE AND OBJECTIVE BOX
Over Night Events: events noted, remain criitically ill, ? hypotensive sp IVF, no fever  PHYSICAL EXAM    ICU Vital Signs Last 24 Hrs  T(C): 36.4 (08 Oct 2023 04:00), Max: 37 (07 Oct 2023 12:00)  T(F): 97.5 (08 Oct 2023 04:00), Max: 98.6 (07 Oct 2023 12:00)  HR: 64 (08 Oct 2023 07:00) (49 - 124)  BP: 88/52 (08 Oct 2023 07:00) (71/42 - 111/59)  BP(mean): 65 (08 Oct 2023 07:00) (51 - 77)  RR: 19 (08 Oct 2023 07:00) (11 - 39)  SpO2: 98% (08 Oct 2023 07:00) (81% - 100%)    O2 Parameters below as of 08 Oct 2023 06:00  Patient On (Oxygen Delivery Method): nasal cannula, high flow  O2 Flow (L/min): 40  O2 Concentration (%): 40        General: ill looking  Lungs: Bilateral rhonci  Cardiovascular: Regular   Abdomen: Soft, Positive BS  Extremities: No clubbing   S  Neurological: Non focal       10-07-23 @ 07:01  -  10-08-23 @ 07:00  --------------------------------------------------------  IN:    IV PiggyBack: 50 mL    Sodium Chloride 0.9% Bolus: 500 mL  Total IN: 550 mL    OUT:    Voided (mL): 650 mL  Total OUT: 650 mL    Total NET: -100 mL          LABS:                          11.0   4.94  )-----------( 358      ( 08 Oct 2023 04:37 )             35.7                                               10-07    140  |  102  |  8<L>  ----------------------------<  105<H>  3.9   |  28  |  0.7    Ca    8.2<L>      07 Oct 2023 04:33  Phos  3.3     10-07  Mg     2.2     10-07    TPro  5.6<L>  /  Alb  2.8<L>  /  TBili  0.3  /  DBili  x   /  AST  21  /  ALT  20  /  AlkPhos  88  10-07                                             Urinalysis Basic - ( 07 Oct 2023 04:33 )    Color: x / Appearance: x / SG: x / pH: x  Gluc: 105 mg/dL / Ketone: x  / Bili: x / Urobili: x   Blood: x / Protein: x / Nitrite: x   Leuk Esterase: x / RBC: x / WBC x   Sq Epi: x / Non Sq Epi: x / Bacteria: x                                                  LIVER FUNCTIONS - ( 07 Oct 2023 04:33 )  Alb: 2.8 g/dL / Pro: 5.6 g/dL / ALK PHOS: 88 U/L / ALT: 20 U/L / AST: 21 U/L / GGT: x                                                                                                                                       MEDICATIONS  (STANDING):  bacitracin   Ointment 1 Application(s) Topical two times a day  chlorhexidine 2% Cloths 1 Application(s) Topical <User Schedule>  gabapentin 600 milliGRAM(s) Oral daily  heparin   Injectable 5000 Unit(s) SubCutaneous every 12 hours  meropenem  IVPB 1000 milliGRAM(s) IV Intermittent every 8 hours  midodrine 10 milliGRAM(s) Oral every 8 hours  pantoprazole  Injectable 40 milliGRAM(s) IV Push two times a day  polyethylene glycol 3350 17 Gram(s) Oral two times a day  senna 2 Tablet(s) Oral at bedtime    MEDICATIONS  (PRN):  acetaminophen     Tablet .. 650 milliGRAM(s) Oral every 6 hours PRN Temp greater or equal to 38C (100.4F), Mild Pain (1 - 3)    CXR noted

## 2023-10-08 NOTE — PROGRESS NOTE ADULT - SUBJECTIVE AND OBJECTIVE BOX
24H events:    Patient is a 58y old Female who presents with a chief complaint of Upper GI bleed (07 Oct 2023 07:48)    Primary diagnosis of GI bleed      Today is 9d of hospitalization. This morning patient was seen and examined at bedside, resting comfortably in bed.    No acute or major events overnight. Pt. more awake, tolerating PO.     Code Status:    Family communication:  Contact date:  Name of person contacted:  Relationship to patient:  Communication details:  What matters most:    PAST MEDICAL & SURGICAL HISTORY  Down syndrome    Osteoporosis    Mild anemia    Neuropathy    S/P debridement  of R hip on 3/2/21      SOCIAL HISTORY:  Social History:      ALLERGIES:  No Known Allergies    MEDICATIONS:  STANDING MEDICATIONS  bacitracin   Ointment 1 Application(s) Topical two times a day  chlorhexidine 2% Cloths 1 Application(s) Topical <User Schedule>  gabapentin 600 milliGRAM(s) Oral daily  heparin   Injectable 5000 Unit(s) SubCutaneous every 12 hours  meropenem  IVPB 1000 milliGRAM(s) IV Intermittent every 8 hours  midodrine 10 milliGRAM(s) Oral every 8 hours  pantoprazole  Injectable 40 milliGRAM(s) IV Push two times a day  polyethylene glycol 3350 17 Gram(s) Oral two times a day  senna 2 Tablet(s) Oral at bedtime    PRN MEDICATIONS  acetaminophen     Tablet .. 650 milliGRAM(s) Oral every 6 hours PRN    VITALS:   T(F): 96.8  HR: 71  BP: 106/46  RR: 28  SpO2: 94%    PHYSICAL EXAM:  GENERAL:   ( x) NAD, lying in bed comfortably     (  ) obtunded     (  ) lethargic     (  ) somnolent    HEAD:   ( x) Atraumatic     (  ) hematoma     (  ) laceration (specify location:       )     NECK:  (x) Supple     (  ) neck stiffness     (  ) nuchal rigidity     (  )  no JVD     (  ) JVD present ( -- cm)    HEART:  Rate -->     (x) normal rate     (  ) bradycardic     (  ) tachycardic  Rhythm -->     (x) regular     (  ) regularly irregular     (  ) irregularly irregular  Murmurs -->     (x) normal s1s2     (  ) systolic murmur     (  ) diastolic murmur     (  ) continuous murmur      (  ) S3 present     (  ) S4 present    LUNGS:   ( x)Unlabored respirations     (  ) tachypnea  ( x) B/L air entry     (  ) decreased breath sounds in:  (location     )    ( x) no adventitious sound     (  ) crackles     (  ) wheezing      (  ) rhonchi      (specify location:       )  (  ) chest wall tenderness (specify location:       )    ABDOMEN:   ( x) Soft     (  ) tense   |   (X  ) nondistended     (  ) distended   |   ( X ) +BS     (  ) hypoactive bowel sounds     (  ) hyperactive bowel sounds  ( x) nontender     (  ) RUQ tenderness     (  ) RLQ tenderness     (  ) LLQ tenderness     (  ) epigastric tenderness     (  ) diffuse tenderness  (  ) Splenomegaly      (  ) Hepatomegaly      (  ) Jaundice     (  ) ecchymosis     EXTREMITIES:  ( x) Normal     (  ) Rash     (  ) ecchymosis     (  ) varicose veins      (  ) pitting edema     (  ) non-pitting edema   (  ) ulceration     (  ) gangrene:     (location:     )    NERVOUS SYSTEM:    ( x) A&Ox3     (  ) confused     (  ) lethargic  CN II-XII:     (  ) Intact     (  ) deficits found     (Specify:     )   Upper extremities:     (  ) no sensorimotor deficits     (  ) weakness     (  ) loss of proprioception/vibration     (  ) loss of touch/temperature (specify:    )  Lower extremities:     (  ) no sensorimotor deficits     (  ) weakness     (  ) loss of proprioception/vibration     (  ) loss of touch/temperature (specify:    )    SKIN:   ( X ) No rashes or lesions     (  ) maculopapular rash     (  ) pustules     (  ) vesicles     (  ) ulcer     (  ) ecchymosis     (specify location:     )    AMPAC score :    (  ) Indwelling Madsen Catheter:   Date inserted:    Reason (  ) Critical illness     (  ) urinary retention    (  ) Accurate Ins/Outs Monitoring     (  ) CMO patient    (  ) Central Line:   Date inserted:  Location: (  ) Right IJ     (  ) Left IJ     (  ) Right Fem     (  ) Left Fem    (  ) SPC        (  ) pigtail       (  ) PEG tube       (  ) colostomy       (  ) jejunostomy  (  ) U-Dall    LABS:                        10.3   4.05  )-----------( 341      ( 07 Oct 2023 04:33 )             33.5     10-07    140  |  102  |  8<L>  ----------------------------<  105<H>  3.9   |  28  |  0.7    Ca    8.2<L>      07 Oct 2023 04:33  Phos  3.3     10-07  Mg     2.2     10-07    TPro  5.6<L>  /  Alb  2.8<L>  /  TBili  0.3  /  DBili  x   /  AST  21  /  ALT  20  /  AlkPhos  88  10-07      Urinalysis Basic - ( 07 Oct 2023 04:33 )    Color: x / Appearance: x / SG: x / pH: x  Gluc: 105 mg/dL / Ketone: x  / Bili: x / Urobili: x   Blood: x / Protein: x / Nitrite: x   Leuk Esterase: x / RBC: x / WBC x   Sq Epi: x / Non Sq Epi: x / Bacteria: x                    RADIOLOGY:                 24H events:    Patient is a 58y old Female who presents with a chief complaint of Upper GI bleed (07 Oct 2023 07:48)    Primary diagnosis of GI bleed      Today is 9d of hospitalization. This morning patient was seen and examined at bedside, resting comfortably in bed.    No acute or major events overnight. Pt. more awake, tolerating PO.     Code Status:    Family communication:  Contact date:  Name of person contacted:  Relationship to patient:  Communication details:  What matters most:    PAST MEDICAL & SURGICAL HISTORY  Down syndrome    Osteoporosis    Mild anemia    Neuropathy    S/P debridement  of R hip on 3/2/21      SOCIAL HISTORY:  Social History:      ALLERGIES:  No Known Allergies    MEDICATIONS:  STANDING MEDICATIONS  bacitracin   Ointment 1 Application(s) Topical two times a day  chlorhexidine 2% Cloths 1 Application(s) Topical <User Schedule>  gabapentin 600 milliGRAM(s) Oral daily  heparin   Injectable 5000 Unit(s) SubCutaneous every 12 hours  meropenem  IVPB 1000 milliGRAM(s) IV Intermittent every 8 hours  midodrine 10 milliGRAM(s) Oral every 8 hours  pantoprazole  Injectable 40 milliGRAM(s) IV Push two times a day  polyethylene glycol 3350 17 Gram(s) Oral two times a day  senna 2 Tablet(s) Oral at bedtime    PRN MEDICATIONS  acetaminophen     Tablet .. 650 milliGRAM(s) Oral every 6 hours PRN    VITALS:   T(F): 96.8  HR: 71  BP: 106/46  RR: 28  SpO2: 94%    PHYSICAL EXAM:  GENERAL:   ( x) NAD, lying in bed comfortably     (  ) obtunded     (  ) lethargic     (  ) somnolent    HEAD:   ( x) Atraumatic     (  ) hematoma     (  ) laceration (specify location:       )     NECK:  (x) Supple     (  ) neck stiffness     (  ) nuchal rigidity     (  )  no JVD     (  ) JVD present ( -- cm)    HEART:  Rate -->     (x) normal rate     (  ) bradycardic     (  ) tachycardic  Rhythm -->     (x) regular     (  ) regularly irregular     (  ) irregularly irregular  Murmurs -->     (x) normal s1s2     (  ) systolic murmur     (  ) diastolic murmur     (  ) continuous murmur      (  ) S3 present     (  ) S4 present    LUNGS:   ( x)Unlabored respirations     (  ) tachypnea  ( x) B/L air entry     (  ) decreased breath sounds in:  (location     )    ( x) no adventitious sound     (  ) crackles     (  ) wheezing      (  ) rhonchi      (specify location:       )  (  ) chest wall tenderness (specify location:       )    ABDOMEN:   ( x) Soft     (  ) tense   |   (X  ) nondistended     (  ) distended   |   (  ) +BS     (  ) hypoactive bowel sounds     (  ) hyperactive bowel sounds  ( x) nontender     (  ) RUQ tenderness     (  ) RLQ tenderness     (  ) LLQ tenderness     (  ) epigastric tenderness     (  ) diffuse tenderness  (  ) Splenomegaly      (  ) Hepatomegaly      (  ) Jaundice     (  ) ecchymosis     EXTREMITIES:  ( x) Normal     (  ) Rash     (  ) ecchymosis     (  ) varicose veins      (  ) pitting edema     (  ) non-pitting edema   (  ) ulceration     (  ) gangrene:     (location:     )    NERVOUS SYSTEM:    ( ) A&Ox3     (  ) confused     (  ) lethargic    * at baseline   CN II-XII:     (  ) Intact     (  ) deficits found     (Specify:     )   Upper extremities:     (  ) no sensorimotor deficits     (  ) weakness     (  ) loss of proprioception/vibration     (  ) loss of touch/temperature (specify:    )  Lower extremities:     (  ) no sensorimotor deficits     (  ) weakness     (  ) loss of proprioception/vibration     (  ) loss of touch/temperature (specify:    )    SKIN:   ( X ) No rashes or lesions     (  ) maculopapular rash     (  ) pustules     (  ) vesicles     (  ) ulcer     (  ) ecchymosis     (specify location:     )    AMPAC score :    (  ) Indwelling Madsen Catheter:   Date inserted:    Reason (  ) Critical illness     (  ) urinary retention    (  ) Accurate Ins/Outs Monitoring     (  ) CMO patient    (  ) Central Line:   Date inserted:  Location: (  ) Right IJ     (  ) Left IJ     (  ) Right Fem     (  ) Left Fem    (  ) SPC        (  ) pigtail       (  ) PEG tube       (  ) colostomy       (  ) jejunostomy  (  ) U-Dall    LABS:                        10.3   4.05  )-----------( 341      ( 07 Oct 2023 04:33 )             33.5     10-07    140  |  102  |  8<L>  ----------------------------<  105<H>  3.9   |  28  |  0.7    Ca    8.2<L>      07 Oct 2023 04:33  Phos  3.3     10-07  Mg     2.2     10-07    TPro  5.6<L>  /  Alb  2.8<L>  /  TBili  0.3  /  DBili  x   /  AST  21  /  ALT  20  /  AlkPhos  88  10-07      Urinalysis Basic - ( 07 Oct 2023 04:33 )    Color: x / Appearance: x / SG: x / pH: x  Gluc: 105 mg/dL / Ketone: x  / Bili: x / Urobili: x   Blood: x / Protein: x / Nitrite: x   Leuk Esterase: x / RBC: x / WBC x   Sq Epi: x / Non Sq Epi: x / Bacteria: x                    RADIOLOGY:

## 2023-10-09 LAB
ALBUMIN SERPL ELPH-MCNC: 3.1 G/DL — LOW (ref 3.5–5.2)
ALP SERPL-CCNC: 101 U/L — SIGNIFICANT CHANGE UP (ref 30–115)
ALT FLD-CCNC: 34 U/L — SIGNIFICANT CHANGE UP (ref 0–41)
ANION GAP SERPL CALC-SCNC: 9 MMOL/L — SIGNIFICANT CHANGE UP (ref 7–14)
AST SERPL-CCNC: 65 U/L — HIGH (ref 0–41)
BASOPHILS # BLD AUTO: 0.07 K/UL — SIGNIFICANT CHANGE UP (ref 0–0.2)
BASOPHILS NFR BLD AUTO: 1.6 % — HIGH (ref 0–1)
BILIRUB SERPL-MCNC: 0.2 MG/DL — SIGNIFICANT CHANGE UP (ref 0.2–1.2)
BUN SERPL-MCNC: 10 MG/DL — SIGNIFICANT CHANGE UP (ref 10–20)
CALCIUM SERPL-MCNC: 8.5 MG/DL — SIGNIFICANT CHANGE UP (ref 8.4–10.5)
CHLORIDE SERPL-SCNC: 100 MMOL/L — SIGNIFICANT CHANGE UP (ref 98–110)
CO2 SERPL-SCNC: 26 MMOL/L — SIGNIFICANT CHANGE UP (ref 17–32)
CREAT SERPL-MCNC: 0.8 MG/DL — SIGNIFICANT CHANGE UP (ref 0.7–1.5)
EGFR: 86 ML/MIN/1.73M2 — SIGNIFICANT CHANGE UP
EOSINOPHIL # BLD AUTO: 0.17 K/UL — SIGNIFICANT CHANGE UP (ref 0–0.7)
EOSINOPHIL NFR BLD AUTO: 3.8 % — SIGNIFICANT CHANGE UP (ref 0–8)
GLUCOSE SERPL-MCNC: 118 MG/DL — HIGH (ref 70–99)
HCT VFR BLD CALC: 40 % — SIGNIFICANT CHANGE UP (ref 37–47)
HGB BLD-MCNC: 12 G/DL — SIGNIFICANT CHANGE UP (ref 12–16)
IMM GRANULOCYTES NFR BLD AUTO: 0.5 % — HIGH (ref 0.1–0.3)
LYMPHOCYTES # BLD AUTO: 1.19 K/UL — LOW (ref 1.2–3.4)
LYMPHOCYTES # BLD AUTO: 26.8 % — SIGNIFICANT CHANGE UP (ref 20.5–51.1)
MAGNESIUM SERPL-MCNC: 2.3 MG/DL — SIGNIFICANT CHANGE UP (ref 1.8–2.4)
MCHC RBC-ENTMCNC: 22.6 PG — LOW (ref 27–31)
MCHC RBC-ENTMCNC: 30 G/DL — LOW (ref 32–37)
MCV RBC AUTO: 75.2 FL — LOW (ref 81–99)
MONOCYTES # BLD AUTO: 0.54 K/UL — SIGNIFICANT CHANGE UP (ref 0.1–0.6)
MONOCYTES NFR BLD AUTO: 12.2 % — HIGH (ref 1.7–9.3)
NEUTROPHILS # BLD AUTO: 2.45 K/UL — SIGNIFICANT CHANGE UP (ref 1.4–6.5)
NEUTROPHILS NFR BLD AUTO: 55.1 % — SIGNIFICANT CHANGE UP (ref 42.2–75.2)
NRBC # BLD: 0 /100 WBCS — SIGNIFICANT CHANGE UP (ref 0–0)
PLATELET # BLD AUTO: 360 K/UL — SIGNIFICANT CHANGE UP (ref 130–400)
PMV BLD: 9.6 FL — SIGNIFICANT CHANGE UP (ref 7.4–10.4)
POTASSIUM SERPL-MCNC: 5.5 MMOL/L — HIGH (ref 3.5–5)
POTASSIUM SERPL-SCNC: 5.5 MMOL/L — HIGH (ref 3.5–5)
PROT SERPL-MCNC: 6.8 G/DL — SIGNIFICANT CHANGE UP (ref 6–8)
RBC # BLD: 5.32 M/UL — SIGNIFICANT CHANGE UP (ref 4.2–5.4)
RBC # FLD: 23.5 % — HIGH (ref 11.5–14.5)
SODIUM SERPL-SCNC: 135 MMOL/L — SIGNIFICANT CHANGE UP (ref 135–146)
WBC # BLD: 4.44 K/UL — LOW (ref 4.8–10.8)
WBC # FLD AUTO: 4.44 K/UL — LOW (ref 4.8–10.8)

## 2023-10-09 PROCEDURE — 71045 X-RAY EXAM CHEST 1 VIEW: CPT | Mod: 26

## 2023-10-09 PROCEDURE — 99233 SBSQ HOSP IP/OBS HIGH 50: CPT

## 2023-10-09 RX ORDER — ENOXAPARIN SODIUM 100 MG/ML
40 INJECTION SUBCUTANEOUS EVERY 24 HOURS
Refills: 0 | Status: DISCONTINUED | OUTPATIENT
Start: 2023-10-09 | End: 2023-10-13

## 2023-10-09 RX ADMIN — POLYETHYLENE GLYCOL 3350 17 GRAM(S): 17 POWDER, FOR SOLUTION ORAL at 05:09

## 2023-10-09 RX ADMIN — PANTOPRAZOLE SODIUM 40 MILLIGRAM(S): 20 TABLET, DELAYED RELEASE ORAL at 05:09

## 2023-10-09 RX ADMIN — Medication 1 APPLICATION(S): at 17:11

## 2023-10-09 RX ADMIN — SENNA PLUS 2 TABLET(S): 8.6 TABLET ORAL at 22:00

## 2023-10-09 RX ADMIN — CHLORHEXIDINE GLUCONATE 1 APPLICATION(S): 213 SOLUTION TOPICAL at 05:13

## 2023-10-09 RX ADMIN — MEROPENEM 100 MILLIGRAM(S): 1 INJECTION INTRAVENOUS at 05:10

## 2023-10-09 RX ADMIN — HEPARIN SODIUM 5000 UNIT(S): 5000 INJECTION INTRAVENOUS; SUBCUTANEOUS at 05:09

## 2023-10-09 RX ADMIN — PANTOPRAZOLE SODIUM 40 MILLIGRAM(S): 20 TABLET, DELAYED RELEASE ORAL at 17:08

## 2023-10-09 RX ADMIN — MIDODRINE HYDROCHLORIDE 10 MILLIGRAM(S): 2.5 TABLET ORAL at 13:33

## 2023-10-09 RX ADMIN — ENOXAPARIN SODIUM 40 MILLIGRAM(S): 100 INJECTION SUBCUTANEOUS at 13:31

## 2023-10-09 RX ADMIN — MEROPENEM 100 MILLIGRAM(S): 1 INJECTION INTRAVENOUS at 13:30

## 2023-10-09 RX ADMIN — Medication 1 APPLICATION(S): at 05:10

## 2023-10-09 RX ADMIN — GABAPENTIN 600 MILLIGRAM(S): 400 CAPSULE ORAL at 11:38

## 2023-10-09 RX ADMIN — POLYETHYLENE GLYCOL 3350 17 GRAM(S): 17 POWDER, FOR SOLUTION ORAL at 17:09

## 2023-10-09 RX ADMIN — MEROPENEM 100 MILLIGRAM(S): 1 INJECTION INTRAVENOUS at 21:59

## 2023-10-09 RX ADMIN — MIDODRINE HYDROCHLORIDE 10 MILLIGRAM(S): 2.5 TABLET ORAL at 22:00

## 2023-10-09 RX ADMIN — MIDODRINE HYDROCHLORIDE 10 MILLIGRAM(S): 2.5 TABLET ORAL at 05:10

## 2023-10-09 NOTE — PROGRESS NOTE ADULT - ASSESSMENT
58F with PMH of Down syndrome, cerebral palsy, severe intellectual disability, nonverbal at baseline, hypothyroidism, congenital pulmonary stenosis, chronic pressure ulcers, orthostatic hypotension, constipation, presents to the ED for coffee ground emesis and AMS. Patient was recently admitted to the hospital for management of pneumonia and resultant hypoxic respiratory failure. Patient admitted to MICU for presumptive upper GI bleed. Surgery consulted for CT findings of duodenal perforation. No acute intervention was done and patient was downgraded to SDU     Hypovolemic and septic shock - resolved, off levophed  Suspected Acute upper GI bleed s/p 2 units pRBC  Possible duodenal perforation sp surgical evaluation  - CT A/P 9/29 - 1. New foci of extraluminal air along posterior wall of third portion duodenum, most consistent with perforated ulcer. 2. Diffuse circumferential wall thickening of the colon and rectum, nonspecific. Correlate for proctocolitis.3. Bilateral lower lobe and right middle lobe multifocal pneumonia.  - repeat CTAP w/ oral and IV contrast: No contrast extravasation, Previous seen foci of retroperitoneal gas no longer identified.  - s/p 2u PRBC since admission, Hb stable  - off levophed, on midodrine  - Surgery: no plans for any surgical intervention. Pt is tolerated feeds  - s/p GI eval: no intervention  - on PPI q12H, bowel regimen.   - CBC and active T&S    Acute hypoxemic respiratory failure s/p intubation, likely due to multifocal PNA/Possible Aspiration   - extubated 10/4, now on 2L NC   - infectious work up unremarkable  - trach aspirate cx x2- no growth  - on meropenem for total of 8 days per ID (last dose tomorrow)  - continue puree w/ mildly thick liquids diet per s/s    NELA likely prerenal, resolved  - Cr 1.6 > 1.2 > 1.1 > 0.6   - barlow removed, monitor I&Os    NSTEMI likely type II due to shock   - Echo results 9/30 EF 55 to 60 %, Normal left systolic function Diastolic function could not be assessed.   - Trend trop: 0.34 -> 0.11   - c/w telemetry monitoring     Overall prognosis is poor, continue monitoring in SDU, possible downgrade tomorrow

## 2023-10-09 NOTE — PROGRESS NOTE ADULT - ASSESSMENT
IMPRESSION:    Acute hypoxemic respiratory failure  on HHFNC sp extubation slowly improving on NC  Suspected upper GI bleed s/p 2 units pRBC  Possible duodenal perforation sp surgical/ GI eval no intervention  aspiration pneumonia  polymicrobial bacteremia   NSTEMI likely type II   NELA likely prerenal resolved  H/o CP  H/o seizures    PLAN:    CNS: Avoid CNS depressant    HEENT: Oral care    PULMONARY: HOB at 45 degrees.  aspiration precaution, keep SaO2 92 TO 96%, on NC    CARDIOVASCULAR: ECHO noted.  Midodrine    GI: feeding Protonix BID.  feeding per speech    RENAL:    FU lytes.  Correct as needed.      INFECTIOUS DISEASE:  abx per ID,    HEMATOLOGICAL: DVT prophylaxis seq.  Monitor CBC.      ENDOCRINE:  Follow up FS.  Insulin protocol if needed.    MUSCULOSKELETAL: Bedrest.  Off loading.      Prognosis guarded.      dispo SDU  multiple skin ulcers  palliative care fup

## 2023-10-09 NOTE — PROGRESS NOTE ADULT - SUBJECTIVE AND OBJECTIVE BOX
TEA ECHAVARRIA  58y Female    CHIEF COMPLAINT:    Patient is a 58y old  Female who presents with a chief complaint of Upper GI bleed (09 Oct 2023 07:03)    INTERVAL HPI/OVERNIGHT EVENTS:    Patient seen and examined. No acute events overnight. Downgraded from MICU     ROS: All other systems are negative.    Vital Signs:    T(F): 97.4 (10-09-23 @ 08:00), Max: 99.4 (10-08-23 @ 20:00)  HR: 78 (10-09-23 @ 08:00) (61 - 124)  BP: 97/54 (10-09-23 @ 08:00) (82/61 - 144/56)  RR: 17 (10-09-23 @ 08:00) (17 - 34)  SpO2: 98% (10-09-23 @ 08:00) (90% - 98%)    08 Oct 2023 07:01  -  09 Oct 2023 07:00  --------------------------------------------------------  IN: 550 mL / OUT: 2000 mL / NET: -1450 mL    09 Oct 2023 07:01  -  09 Oct 2023 10:12  --------------------------------------------------------  IN: 240 mL / OUT: 0 mL / NET: 240 mL    PHYSICAL EXAM:    GENERAL:  NAD  SKIN: No rashes or lesions  HEENT: Atraumatic. Normocephalic.    NECK: Supple, No JVD.   PULMONARY: decreased breath sounds B/L. No wheezing.   CVS: Normal S1, S2. Rate and Rhythm are regular.   ABDOMEN/GI: Soft, Nontender, mildly distended;   MSK:  No clubbing or cyanosis   NEUROLOGIC: moves all extremities   PSYCH: Awake, nonverbal     Consultant(s) Notes Reviewed:  [x ] YES  [ ] NO  Care Discussed with Consultants/Other Providers [ x] YES  [ ] NO    LABS:                        11.0   4.94  )-----------( 358      ( 08 Oct 2023 04:37 )             35.7     140  |  103  |  9<L>  ----------------------------<  92  4.2   |  28  |  0.6<L>    Ca    8.4      08 Oct 2023 04:37  Phos  3.4     10-08  Mg     2.2     10-08    TPro  5.6<L>  /  Alb  2.5<L>  /  TBili  0.2  /  DBili  x   /  AST  23  /  ALT  20  /  AlkPhos  77  10-08    RADIOLOGY & ADDITIONAL TESTS  Imaging or report Personally Reviewed:  [x] YES  [ ] NO  EKG reviewed: [x] YES  [ ] NO    Medications:  Standing  bacitracin   Ointment 1 Application(s) Topical two times a day  chlorhexidine 2% Cloths 1 Application(s) Topical <User Schedule>  gabapentin 600 milliGRAM(s) Oral daily  heparin   Injectable 5000 Unit(s) SubCutaneous every 12 hours  lactated ringers. 500 milliLiter(s) IV Continuous <Continuous>  meropenem  IVPB 1000 milliGRAM(s) IV Intermittent every 8 hours  midodrine 10 milliGRAM(s) Oral every 8 hours  pantoprazole  Injectable 40 milliGRAM(s) IV Push two times a day  polyethylene glycol 3350 17 Gram(s) Oral two times a day  senna 2 Tablet(s) Oral at bedtime    PRN Meds  acetaminophen     Tablet .. 650 milliGRAM(s) Oral every 6 hours PRN

## 2023-10-09 NOTE — PROGRESS NOTE ADULT - SUBJECTIVE AND OBJECTIVE BOX
24H events:    Patient is a 58y old Female who presents with a chief complaint of Upper GI bleed (09 Oct 2023 10:09)    Primary diagnosis of GI bleed      Today is hospital day 10d. This morning patient was seen and examined at bedside, did not allow me to examine her.    No acute or major events overnight.      PAST MEDICAL & SURGICAL HISTORY  Down syndrome    Osteoporosis    Mild anemia    Neuropathy    S/P debridement  of R hip on 3/2/21      SOCIAL HISTORY:  Social History:      ALLERGIES:  No Known Allergies    MEDICATIONS:  STANDING MEDICATIONS  bacitracin   Ointment 1 Application(s) Topical two times a day  chlorhexidine 2% Cloths 1 Application(s) Topical <User Schedule>  gabapentin 600 milliGRAM(s) Oral daily  heparin   Injectable 5000 Unit(s) SubCutaneous every 12 hours  lactated ringers. 500 milliLiter(s) IV Continuous <Continuous>  meropenem  IVPB 1000 milliGRAM(s) IV Intermittent every 8 hours  midodrine 10 milliGRAM(s) Oral every 8 hours  pantoprazole  Injectable 40 milliGRAM(s) IV Push two times a day  polyethylene glycol 3350 17 Gram(s) Oral two times a day  senna 2 Tablet(s) Oral at bedtime    PRN MEDICATIONS  acetaminophen     Tablet .. 650 milliGRAM(s) Oral every 6 hours PRN    VITALS:   T(F): 97.4  HR: 78  BP: 97/54  RR: 17  SpO2: 98%    PHYSICAL EXAM:  GENERAL:   ( X ) NAD, lying in bed comfortably     (  ) obtunded     (  ) lethargic     (  ) somnolent    HEAD:   ( X ) Atraumatic     (  ) hematoma     (  ) laceration (specify location:       )     NECK:  (  ) Supple     (  ) neck stiffness     (  ) nuchal rigidity     (  )  no JVD     (  ) JVD present ( -- cm)    HEART:  Rate -->     ( X ) normal rate     (  ) bradycardic     (  ) tachycardic  Rhythm -->     ( X ) regular     (  ) regularly irregular     (  ) irregularly irregular  Murmurs -->     ( X ) normal s1s2     (  ) systolic murmur     (  ) diastolic murmur     (  ) continuous murmur      (  ) S3 present     (  ) S4 present    LUNGS:   ( X )Unlabored respirations     (  ) tachypnea  ( X ) B/L air entry     (  ) decreased breath sounds in:  (location     )    ( X ) no adventitious sound     (  ) crackles     (  ) wheezing      (  ) rhonchi      (specify location:       )  (  ) chest wall tenderness (specify location:       )    ABDOMEN:   ( X ) Soft     (  ) tense   |   (  ) nondistended     (  ) distended   |   (  ) +BS     (  ) hypoactive bowel sounds     (  ) hyperactive bowel sounds  ( X ) nontender     (  ) RUQ tenderness     (  ) RLQ tenderness     (  ) LLQ tenderness     (  ) epigastric tenderness     (  ) diffuse tenderness  (  ) Splenomegaly      (  ) Hepatomegaly      (  ) Jaundice     (  ) ecchymosis     EXTREMITIES:  (  ) Normal     (  ) Rash     (  ) ecchymosis     (  ) varicose veins      (  ) pitting edema     (  ) non-pitting edema   (  ) ulceration     (  ) gangrene:     (location:     )    NERVOUS SYSTEM:    (  ) A&Ox3     ( X ) confused     (  ) lethargic  CN II-XII:     (  ) Intact     (  ) deficits found     (Specify:     )   Upper extremities:     (  ) no sensorimotor deficits     (  ) weakness     (  ) loss of proprioception/vibration     (  ) loss of touch/temperature (specify:    )  Lower extremities:     (  ) no sensorimotor deficits     (  ) weakness     (  ) loss of proprioception/vibration     (  ) loss of touch/temperature (specify:    )    SKIN:   (  ) No rashes or lesions     (  ) maculopapular rash     (  ) pustules     (  ) vesicles     (  ) ulcer     (  ) ecchymosis     (specify location:     )    (  ) Indwelling Madsen Catheter:   Date insterted:    Reason (  ) Critical illness     (  ) urinary retention    (  ) Accurate Ins/Outs Monitoring     (  ) CMO patient    (  ) Central Line:   Date inserted:  Location: (  ) Right IJ     (  ) Left IJ     (  ) Right Fem     (  ) Left Fem    (  ) SPC        (  ) pigtail       (  ) PEG tube       (  ) colostomy       (  ) jejunostomy  (  ) U-Dall    LABS:                        11.0   4.94  )-----------( 358      ( 08 Oct 2023 04:37 )             35.7     10-08    140  |  103  |  9<L>  ----------------------------<  92  4.2   |  28  |  0.6<L>    Ca    8.4      08 Oct 2023 04:37  Phos  3.4     10-08  Mg     2.2     10-08    TPro  5.6<L>  /  Alb  2.5<L>  /  TBili  0.2  /  DBili  x   /  AST  23  /  ALT  20  /  AlkPhos  77  10-08      Urinalysis Basic - ( 08 Oct 2023 04:37 )    Color: x / Appearance: x / SG: x / pH: x  Gluc: 92 mg/dL / Ketone: x  / Bili: x / Urobili: x   Blood: x / Protein: x / Nitrite: x   Leuk Esterase: x / RBC: x / WBC x   Sq Epi: x / Non Sq Epi: x / Bacteria: x

## 2023-10-09 NOTE — PROGRESS NOTE ADULT - ASSESSMENT
[id number] with PMH of Down syndrome, cerebral palsy, severe intellectual disability, nonverbal at baseline, hypothyroidism, congenital pulmonary stenosis, chronic pressure ulcers, orthostatic hypotension, constipation, presents to the ED for coffee ground emesis and AMS. Patient was recently admitted to the hospital for management of pneumonia and resultant hypoxic respiratory failure. Patient admitted to MICU for presumptive upper GI bleed. Surgery consulted for CT findings of duodenal perforation. No acute intervention was done and patient was downgraded to SDU     Hypovolemic and septic shock - resolved, off levophed  Suspected Acute upper GI bleed s/p 2 units pRBC  Possible duodenal perforation sp surgical evaluation  - CT A/P 9/29 - 1. New foci of extraluminal air along posterior wall of third portion duodenum, most consistent with perforated ulcer. 2. Diffuse circumferential wall thickening of the colon and rectum, nonspecific. Correlate for proctocolitis.3. Bilateral lower lobe and right middle lobe multifocal pneumonia.  - repeat CTAP w/ oral and IV contrast: No contrast extravasation, Previous seen foci of retroperitoneal gas no longer identified.  - s/p 2u PRBC since admission, Hb stable  - off levophed, on midodrine  - Surgery: no plans for any surgical intervention. Pt is tolerated feeds  - s/p GI eval: no intervention  - on PPI q12H, bowel regimen.   - CBC and active T&S    Acute hypoxemic respiratory failure s/p intubation, likely due to multifocal PNA/Possible Aspiration   - extubated 10/4, now on 2L NC   - infectious work up unremarkable  - trach aspirate cx x2- no growth  - on meropenem for total of 8 days per ID   - continue puree w/ mildly thick liquids diet per s/s    NELA likely prerenal, resolved  - Cr 1.6 > 1.2 > 1.1  - barlow removed, monitor I&Os    NSTEMI likely type II due to shock   - Echo results 9/30 EF 55 to 60 %, Normal left systolic function Diastolic function could not be assessed.   - Trend trop: 0.34 -> 0.11   - c/w telemetry monitoring     Overall prognosis is poor, continue monitoring in SDU

## 2023-10-09 NOTE — PROGRESS NOTE ADULT - SUBJECTIVE AND OBJECTIVE BOX
Over Night Events: events noted, on NC, transferred from MICU, afebrile    PHYSICAL EXAM    ICU Vital Signs Last 24 Hrs  T(C): 36.2 (09 Oct 2023 04:00), Max: 37.4 (08 Oct 2023 20:00)  T(F): 97.1 (09 Oct 2023 04:00), Max: 99.4 (08 Oct 2023 20:00)  HR: 80 (09 Oct 2023 04:00) (61 - 124)  BP: 110/55 (09 Oct 2023 04:00) (82/61 - 144/56)  BP(mean): 90 (08 Oct 2023 22:00) (57 - 96)  RR: 18 (09 Oct 2023 04:00) (17 - 34)  SpO2: 93% (09 Oct 2023 04:00) (90% - 97%)    O2 Parameters below as of 08 Oct 2023 22:30  Patient On (Oxygen Delivery Method): nasal cannula  O2 Flow (L/min): 2          General: ill looking  Lungs: dec bs both bases  Cardiovascular: HARPREET 2.6   Abdomen: Soft, Positive BS  Extremities: No clubbing   contracted      10-08-23 @ 07:01  -  10-09-23 @ 07:00  --------------------------------------------------------  IN:    IV PiggyBack: 50 mL    Lactated Ringers: 500 mL  Total IN: 550 mL    OUT:    Voided (mL): 2000 mL  Total OUT: 2000 mL    Total NET: -1450 mL          LABS:                          11.0   4.94  )-----------( 358      ( 08 Oct 2023 04:37 )             35.7                                               10-08    140  |  103  |  9<L>  ----------------------------<  92  4.2   |  28  |  0.6<L>    Ca    8.4      08 Oct 2023 04:37  Phos  3.4     10-08  Mg     2.2     10-08    TPro  5.6<L>  /  Alb  2.5<L>  /  TBili  0.2  /  DBili  x   /  AST  23  /  ALT  20  /  AlkPhos  77  10-08                                             Urinalysis Basic - ( 08 Oct 2023 04:37 )    Color: x / Appearance: x / SG: x / pH: x  Gluc: 92 mg/dL / Ketone: x  / Bili: x / Urobili: x   Blood: x / Protein: x / Nitrite: x   Leuk Esterase: x / RBC: x / WBC x   Sq Epi: x / Non Sq Epi: x / Bacteria: x                                                  LIVER FUNCTIONS - ( 08 Oct 2023 04:37 )  Alb: 2.5 g/dL / Pro: 5.6 g/dL / ALK PHOS: 77 U/L / ALT: 20 U/L / AST: 23 U/L / GGT: x                                                                                                                                       MEDICATIONS  (STANDING):  bacitracin   Ointment 1 Application(s) Topical two times a day  chlorhexidine 2% Cloths 1 Application(s) Topical <User Schedule>  gabapentin 600 milliGRAM(s) Oral daily  heparin   Injectable 5000 Unit(s) SubCutaneous every 12 hours  lactated ringers. 500 milliLiter(s) (500 mL/Hr) IV Continuous <Continuous>  meropenem  IVPB 1000 milliGRAM(s) IV Intermittent every 8 hours  midodrine 10 milliGRAM(s) Oral every 8 hours  pantoprazole  Injectable 40 milliGRAM(s) IV Push two times a day  polyethylene glycol 3350 17 Gram(s) Oral two times a day  senna 2 Tablet(s) Oral at bedtime    MEDICATIONS  (PRN):  acetaminophen     Tablet .. 650 milliGRAM(s) Oral every 6 hours PRN Temp greater or equal to 38C (100.4F), Mild Pain (1 - 3)

## 2023-10-10 LAB
ALBUMIN SERPL ELPH-MCNC: 3 G/DL — LOW (ref 3.5–5.2)
ALP SERPL-CCNC: 101 U/L — SIGNIFICANT CHANGE UP (ref 30–115)
ALT FLD-CCNC: 34 U/L — SIGNIFICANT CHANGE UP (ref 0–41)
ANION GAP SERPL CALC-SCNC: 11 MMOL/L — SIGNIFICANT CHANGE UP (ref 7–14)
AST SERPL-CCNC: 34 U/L — SIGNIFICANT CHANGE UP (ref 0–41)
BASOPHILS # BLD AUTO: 0.09 K/UL — SIGNIFICANT CHANGE UP (ref 0–0.2)
BASOPHILS NFR BLD AUTO: 1.8 % — HIGH (ref 0–1)
BILIRUB SERPL-MCNC: 0.2 MG/DL — SIGNIFICANT CHANGE UP (ref 0.2–1.2)
BUN SERPL-MCNC: 12 MG/DL — SIGNIFICANT CHANGE UP (ref 10–20)
CALCIUM SERPL-MCNC: 9 MG/DL — SIGNIFICANT CHANGE UP (ref 8.4–10.4)
CHLORIDE SERPL-SCNC: 101 MMOL/L — SIGNIFICANT CHANGE UP (ref 98–110)
CO2 SERPL-SCNC: 29 MMOL/L — SIGNIFICANT CHANGE UP (ref 17–32)
CREAT SERPL-MCNC: 0.8 MG/DL — SIGNIFICANT CHANGE UP (ref 0.7–1.5)
EGFR: 86 ML/MIN/1.73M2 — SIGNIFICANT CHANGE UP
EOSINOPHIL # BLD AUTO: 0.23 K/UL — SIGNIFICANT CHANGE UP (ref 0–0.7)
EOSINOPHIL NFR BLD AUTO: 4.7 % — SIGNIFICANT CHANGE UP (ref 0–8)
GLUCOSE SERPL-MCNC: 92 MG/DL — SIGNIFICANT CHANGE UP (ref 70–99)
HCT VFR BLD CALC: 39.2 % — SIGNIFICANT CHANGE UP (ref 37–47)
HGB BLD-MCNC: 11.9 G/DL — LOW (ref 12–16)
IMM GRANULOCYTES NFR BLD AUTO: 0.2 % — SIGNIFICANT CHANGE UP (ref 0.1–0.3)
LYMPHOCYTES # BLD AUTO: 1.81 K/UL — SIGNIFICANT CHANGE UP (ref 1.2–3.4)
LYMPHOCYTES # BLD AUTO: 36.8 % — SIGNIFICANT CHANGE UP (ref 20.5–51.1)
MAGNESIUM SERPL-MCNC: 2.4 MG/DL — SIGNIFICANT CHANGE UP (ref 1.8–2.4)
MCHC RBC-ENTMCNC: 22.8 PG — LOW (ref 27–31)
MCHC RBC-ENTMCNC: 30.4 G/DL — LOW (ref 32–37)
MCV RBC AUTO: 75.1 FL — LOW (ref 81–99)
MONOCYTES # BLD AUTO: 0.76 K/UL — HIGH (ref 0.1–0.6)
MONOCYTES NFR BLD AUTO: 15.4 % — HIGH (ref 1.7–9.3)
NEUTROPHILS # BLD AUTO: 2.02 K/UL — SIGNIFICANT CHANGE UP (ref 1.4–6.5)
NEUTROPHILS NFR BLD AUTO: 41.1 % — LOW (ref 42.2–75.2)
NRBC # BLD: 0 /100 WBCS — SIGNIFICANT CHANGE UP (ref 0–0)
PLATELET # BLD AUTO: 399 K/UL — SIGNIFICANT CHANGE UP (ref 130–400)
PMV BLD: 9.4 FL — SIGNIFICANT CHANGE UP (ref 7.4–10.4)
POTASSIUM SERPL-MCNC: 4.9 MMOL/L — SIGNIFICANT CHANGE UP (ref 3.5–5)
POTASSIUM SERPL-SCNC: 4.9 MMOL/L — SIGNIFICANT CHANGE UP (ref 3.5–5)
PROT SERPL-MCNC: 6.7 G/DL — SIGNIFICANT CHANGE UP (ref 6–8)
RBC # BLD: 5.22 M/UL — SIGNIFICANT CHANGE UP (ref 4.2–5.4)
RBC # FLD: 23.9 % — HIGH (ref 11.5–14.5)
SODIUM SERPL-SCNC: 141 MMOL/L — SIGNIFICANT CHANGE UP (ref 135–146)
WBC # BLD: 4.92 K/UL — SIGNIFICANT CHANGE UP (ref 4.8–10.8)
WBC # FLD AUTO: 4.92 K/UL — SIGNIFICANT CHANGE UP (ref 4.8–10.8)

## 2023-10-10 PROCEDURE — 99233 SBSQ HOSP IP/OBS HIGH 50: CPT

## 2023-10-10 PROCEDURE — 99232 SBSQ HOSP IP/OBS MODERATE 35: CPT

## 2023-10-10 RX ADMIN — MIDODRINE HYDROCHLORIDE 10 MILLIGRAM(S): 2.5 TABLET ORAL at 13:32

## 2023-10-10 RX ADMIN — POLYETHYLENE GLYCOL 3350 17 GRAM(S): 17 POWDER, FOR SOLUTION ORAL at 17:20

## 2023-10-10 RX ADMIN — Medication 1 APPLICATION(S): at 05:28

## 2023-10-10 RX ADMIN — GABAPENTIN 600 MILLIGRAM(S): 400 CAPSULE ORAL at 11:31

## 2023-10-10 RX ADMIN — MIDODRINE HYDROCHLORIDE 10 MILLIGRAM(S): 2.5 TABLET ORAL at 05:26

## 2023-10-10 RX ADMIN — Medication 1 APPLICATION(S): at 17:21

## 2023-10-10 RX ADMIN — PANTOPRAZOLE SODIUM 40 MILLIGRAM(S): 20 TABLET, DELAYED RELEASE ORAL at 17:20

## 2023-10-10 RX ADMIN — MEROPENEM 100 MILLIGRAM(S): 1 INJECTION INTRAVENOUS at 05:28

## 2023-10-10 RX ADMIN — POLYETHYLENE GLYCOL 3350 17 GRAM(S): 17 POWDER, FOR SOLUTION ORAL at 05:27

## 2023-10-10 RX ADMIN — SENNA PLUS 2 TABLET(S): 8.6 TABLET ORAL at 20:25

## 2023-10-10 RX ADMIN — CHLORHEXIDINE GLUCONATE 1 APPLICATION(S): 213 SOLUTION TOPICAL at 05:26

## 2023-10-10 RX ADMIN — ENOXAPARIN SODIUM 40 MILLIGRAM(S): 100 INJECTION SUBCUTANEOUS at 12:45

## 2023-10-10 RX ADMIN — PANTOPRAZOLE SODIUM 40 MILLIGRAM(S): 20 TABLET, DELAYED RELEASE ORAL at 05:27

## 2023-10-10 RX ADMIN — MIDODRINE HYDROCHLORIDE 10 MILLIGRAM(S): 2.5 TABLET ORAL at 20:25

## 2023-10-10 NOTE — SWALLOW BEDSIDE ASSESSMENT ADULT - SLP GENERAL OBSERVATIONS
pt received in bed awake alert w/o c/o pain. +2L NC +staff report pt tolerating current diet pt received in bed awake alert in no apparent pain. +2L NC +staff report pt tolerating current diet

## 2023-10-10 NOTE — CHART NOTE - NSCHARTNOTEFT_GEN_A_CORE
58 year old female with a PMHx of Down syndrome, nonverbal at baseline, hypothyroidism, cerebral palsy, congenital pulmonary stenosis presents to the ED for hemoptysis.    Patient was recently admitted to the hospital for management of pneumonia and resultant hypoxic respiratory failure.  Patient was noted of having hematemesis when she was admitted previously, a CT of the abdomen showed esophagitis for which GI recommended OP upper endoscopy.    On the day of presentation, the patient was less responsive with noted hemoptysis.   When asking the group home personnel, patient was not exhibiting any signs of respiratory distress, abdominal pain, cough, shortness of breath, fevers/ sweating, or any changes in bowel habits.    In the ED, the patient was HD stable, afebrile.  Labs workup revealed Hb= 9.9, WBC= 11k, plt= 432, creatinine= 1.6.  pH= 7.19/ CO2= 55/ O2= 188/ 21.    Patient received 2 PRBCs, got intubated for airway protection where blood was noted in the trachea.  A CT of the abdomen showed foci of air in the lumen of the stomach, consistent with duodenal perforation.  The CT showed as well bibasilar infiltrates, consistent with pneumonia.    Surgery were consulted, as well as GI.  Patient was started on Protonix drip, and got admitted for management.    Today is hospital day 11d. This morning patient was seen and examined at bedside, resting comfortably in bed.    No acute or major events overnight.      PAST MEDICAL & SURGICAL HISTORY  Down syndrome    Osteoporosis    Mild anemia    Neuropathy    S/P debridement  of R hip on 3/2/21      SOCIAL HISTORY:  Social History:      ALLERGIES:  No Known Allergies    MEDICATIONS:  STANDING MEDICATIONS  bacitracin   Ointment 1 Application(s) Topical two times a day  chlorhexidine 2% Cloths 1 Application(s) Topical <User Schedule>  enoxaparin Injectable 40 milliGRAM(s) SubCutaneous every 24 hours  gabapentin 600 milliGRAM(s) Oral daily  lactated ringers. 500 milliLiter(s) IV Continuous <Continuous>  midodrine 10 milliGRAM(s) Oral every 8 hours  pantoprazole  Injectable 40 milliGRAM(s) IV Push two times a day  polyethylene glycol 3350 17 Gram(s) Oral two times a day  senna 2 Tablet(s) Oral at bedtime    PRN MEDICATIONS  acetaminophen     Tablet .. 650 milliGRAM(s) Oral every 6 hours PRN    VITALS:   T(F): 97.4  HR: 82  BP: 123/61  RR: 20  SpO2: 95%    PHYSICAL EXAM:  GENERAL:   ( X ) NAD, lying in bed comfortably     (  ) obtunded     (  ) lethargic     (  ) somnolent    HEAD:   ( X ) Atraumatic     (  ) hematoma     (  ) laceration (specify location:       )     NECK:  ( X ) Supple     (  ) neck stiffness     (  ) nuchal rigidity     (  )  no JVD     (  ) JVD present ( -- cm)    HEART:  Rate -->     ( X ) normal rate     (  ) bradycardic     (  ) tachycardic  Rhythm -->     ( X ) regular     (  ) regularly irregular     (  ) irregularly irregular  Murmurs -->     ( X ) normal s1s2     (  ) systolic murmur     (  ) diastolic murmur     (  ) continuous murmur      (  ) S3 present     (  ) S4 present    LUNGS:   ( X )Unlabored respirations     (  ) tachypnea  ( X ) B/L air entry     (  ) decreased breath sounds in:  (location     )    ( X ) no adventitious sound     (  ) crackles     (  ) wheezing      (  ) rhonchi      (specify location:       )  (  ) chest wall tenderness (specify location:       )    ABDOMEN:   ( X ) Soft     (  ) tense   |   (  ) nondistended     (  ) distended   |   (  ) +BS     (  ) hypoactive bowel sounds     (  ) hyperactive bowel sounds  ( X ) nontender     (  ) RUQ tenderness     (  ) RLQ tenderness     (  ) LLQ tenderness     (  ) epigastric tenderness     (  ) diffuse tenderness  (  ) Splenomegaly      (  ) Hepatomegaly      (  ) Jaundice     (  ) ecchymosis     EXTREMITIES:  ( X ) Normal     (  ) Rash     (  ) ecchymosis     (  ) varicose veins      (  ) pitting edema     (  ) non-pitting edema   (  ) ulceration     (  ) gangrene:     (location:     )    NERVOUS SYSTEM:  Unable to assess, patient non-compliant  (  ) A&Ox3     (  ) confused     (  ) lethargic  CN II-XII:     (  ) Intact     (  ) deficits found     (Specify:     )   Upper extremities:     (  ) no sensorimotor deficits     (  ) weakness     (  ) loss of proprioception/vibration     (  ) loss of touch/temperature (specify:    )  Lower extremities:     (  ) no sensorimotor deficits     (  ) weakness     (  ) loss of proprioception/vibration     (  ) loss of touch/temperature (specify:    )    SKIN:   ( X ) No rashes or lesions     (  ) maculopapular rash     (  ) pustules     (  ) vesicles     (  ) ulcer     (  ) ecchymosis     (specify location:     )      (  ) Indwelling Barlow Catheter:   Date insterted:    Reason (  ) Critical illness     (  ) urinary retention    (  ) Accurate Ins/Outs Monitoring     (  ) CMO patient    (  ) Central Line:   Date inserted:  Location: (  ) Right IJ     (  ) Left IJ     (  ) Right Fem     (  ) Left Fem    (  ) SPC        (  ) pigtail       (  ) PEG tube       (  ) colostomy       (  ) jejunostomy  (  ) U-Dall    LABS:                        11.9   4.92  )-----------( 399      ( 10 Oct 2023 04:50 )             39.2     10-10    141  |  101  |  12  ----------------------------<  92  4.9   |  29  |  0.8    Ca    9.0      10 Oct 2023 04:50  Mg     2.4     10-10    TPro  6.7  /  Alb  3.0<L>  /  TBili  0.2  /  DBili  x   /  AST  34  /  ALT  34  /  AlkPhos  101  10-10      Urinalysis Basic - ( 10 Oct 2023 04:50 )    Color: x / Appearance: x / SG: x / pH: x  Gluc: 92 mg/dL / Ketone: x  / Bili: x / Urobili: x   Blood: x / Protein: x / Nitrite: x   Leuk Esterase: x / RBC: x / WBC x   Sq Epi: x / Non Sq Epi: x / Bacteria: x      Assessment and Plan:  Hypovolemic and septic shock - resolved, off levophed  Suspected Acute upper GI bleed s/p 2 units pRBC  Possible duodenal perforation sp surgical evaluation  - CT A/P 9/29 - 1. New foci of extraluminal air along posterior wall of third portion duodenum, most consistent with perforated ulcer. 2. Diffuse circumferential wall thickening of the colon and rectum, nonspecific. Correlate for proctocolitis.3. Bilateral lower lobe and right middle lobe multifocal pneumonia.  - repeat CTAP w/ oral and IV contrast: No contrast extravasation, Previous seen foci of retroperitoneal gas no longer identified.  - s/p 2u PRBC since admission, Hb stable  - off levophed, on midodrine  - Surgery: no plans for any surgical intervention. Pt is tolerated feeds  - s/p GI eval: no intervention  - on PPI q12H, bowel regimen.   - CBC and active T&S    Acute hypoxemic respiratory failure s/p intubation, likely due to multifocal PNA/Possible Aspiration   - extubated 10/4, now on 2L NC   - infectious work up unremarkable  - trach aspirate cx x2- no growth  - on meropenem for total of 8 days per ID (last dose tomorrow)  - continue puree w/ mildly thick liquids diet per s/s    NELA likely prerenal, resolved  - Cr 1.6 > 1.2 > 1.1 > 0.6   - barlow removed, monitor I&Os    NSTEMI likely type II due to shock   - Echo results 9/30 EF 55 to 60 %, Normal left systolic function Diastolic function could not be assessed.   - Trend trop: 0.34 -> 0.11   - c/w telemetry monitoring     Overall prognosis is poor, continue monitoring in SDU, possible downgrade tomorrow 58 year old female with a PMHx of Down syndrome, nonverbal at baseline, hypothyroidism, cerebral palsy, congenital pulmonary stenosis presents to the ED for hemoptysis.    Patient was recently admitted to the hospital for management of pneumonia and resultant hypoxic respiratory failure.  Patient was noted of having hematemesis when she was admitted previously, a CT of the abdomen showed esophagitis for which GI recommended OP upper endoscopy.    On the day of presentation, the patient was less responsive with noted hemoptysis.   When asking the group home personnel, patient was not exhibiting any signs of respiratory distress, abdominal pain, cough, shortness of breath, fevers/ sweating, or any changes in bowel habits.    In the ED, the patient was HD stable, afebrile.  Labs workup revealed Hb= 9.9, WBC= 11k, plt= 432, creatinine= 1.6.  pH= 7.19/ CO2= 55/ O2= 188/ 21.    Patient received 2 PRBCs, got intubated for airway protection where blood was noted in the trachea.  A CT of the abdomen showed foci of air in the lumen of the stomach, consistent with duodenal perforation.  The CT showed as well bibasilar infiltrates, consistent with pneumonia.    Surgery were consulted, as well as GI.  Patient was started on Protonix drip, and got admitted for management.    Today is hospital day 11d. This morning patient was seen and examined at bedside, resting comfortably in bed.    No acute or major events overnight.      PAST MEDICAL & SURGICAL HISTORY  Down syndrome    Osteoporosis    Mild anemia    Neuropathy    S/P debridement  of R hip on 3/2/21      SOCIAL HISTORY:  Social History:      ALLERGIES:  No Known Allergies    MEDICATIONS:  STANDING MEDICATIONS  bacitracin   Ointment 1 Application(s) Topical two times a day  chlorhexidine 2% Cloths 1 Application(s) Topical <User Schedule>  enoxaparin Injectable 40 milliGRAM(s) SubCutaneous every 24 hours  gabapentin 600 milliGRAM(s) Oral daily  lactated ringers. 500 milliLiter(s) IV Continuous <Continuous>  midodrine 10 milliGRAM(s) Oral every 8 hours  pantoprazole  Injectable 40 milliGRAM(s) IV Push two times a day  polyethylene glycol 3350 17 Gram(s) Oral two times a day  senna 2 Tablet(s) Oral at bedtime    PRN MEDICATIONS  acetaminophen     Tablet .. 650 milliGRAM(s) Oral every 6 hours PRN    VITALS:   T(F): 97.4  HR: 82  BP: 123/61  RR: 20  SpO2: 95%    PHYSICAL EXAM:  GENERAL:   ( X ) NAD, lying in bed comfortably     (  ) obtunded     (  ) lethargic     (  ) somnolent    HEAD:   ( X ) Atraumatic     (  ) hematoma     (  ) laceration (specify location:       )     NECK:  ( X ) Supple     (  ) neck stiffness     (  ) nuchal rigidity     (  )  no JVD     (  ) JVD present ( -- cm)    HEART:  Rate -->     ( X ) normal rate     (  ) bradycardic     (  ) tachycardic  Rhythm -->     ( X ) regular     (  ) regularly irregular     (  ) irregularly irregular  Murmurs -->     ( X ) normal s1s2     (  ) systolic murmur     (  ) diastolic murmur     (  ) continuous murmur      (  ) S3 present     (  ) S4 present    LUNGS:   ( X )Unlabored respirations     (  ) tachypnea  ( X ) B/L air entry     (  ) decreased breath sounds in:  (location     )    ( X ) no adventitious sound     (  ) crackles     (  ) wheezing      (  ) rhonchi      (specify location:       )  (  ) chest wall tenderness (specify location:       )    ABDOMEN:   ( X ) Soft     (  ) tense   |   (  ) nondistended     (  ) distended   |   (  ) +BS     (  ) hypoactive bowel sounds     (  ) hyperactive bowel sounds  ( X ) nontender     (  ) RUQ tenderness     (  ) RLQ tenderness     (  ) LLQ tenderness     (  ) epigastric tenderness     (  ) diffuse tenderness  (  ) Splenomegaly      (  ) Hepatomegaly      (  ) Jaundice     (  ) ecchymosis     EXTREMITIES:  ( X ) Normal     (  ) Rash     (  ) ecchymosis     (  ) varicose veins      (  ) pitting edema     (  ) non-pitting edema   (  ) ulceration     (  ) gangrene:     (location:     )    NERVOUS SYSTEM:  Unable to assess, patient non-compliant  (  ) A&Ox3     (  ) confused     (  ) lethargic  CN II-XII:     (  ) Intact     (  ) deficits found     (Specify:     )   Upper extremities:     (  ) no sensorimotor deficits     (  ) weakness     (  ) loss of proprioception/vibration     (  ) loss of touch/temperature (specify:    )  Lower extremities:     (  ) no sensorimotor deficits     (  ) weakness     (  ) loss of proprioception/vibration     (  ) loss of touch/temperature (specify:    )    SKIN:   ( X ) No rashes or lesions     (  ) maculopapular rash     (  ) pustules     (  ) vesicles     (  ) ulcer     (  ) ecchymosis     (specify location:     )      (  ) Indwelling Barlow Catheter:   Date insterted:    Reason (  ) Critical illness     (  ) urinary retention    (  ) Accurate Ins/Outs Monitoring     (  ) CMO patient    (  ) Central Line:   Date inserted:  Location: (  ) Right IJ     (  ) Left IJ     (  ) Right Fem     (  ) Left Fem    (  ) SPC        (  ) pigtail       (  ) PEG tube       (  ) colostomy       (  ) jejunostomy  (  ) U-Dall    LABS:                        11.9   4.92  )-----------( 399      ( 10 Oct 2023 04:50 )             39.2     10-10    141  |  101  |  12  ----------------------------<  92  4.9   |  29  |  0.8    Ca    9.0      10 Oct 2023 04:50  Mg     2.4     10-10    TPro  6.7  /  Alb  3.0<L>  /  TBili  0.2  /  DBili  x   /  AST  34  /  ALT  34  /  AlkPhos  101  10-10      Urinalysis Basic - ( 10 Oct 2023 04:50 )    Color: x / Appearance: x / SG: x / pH: x  Gluc: 92 mg/dL / Ketone: x  / Bili: x / Urobili: x   Blood: x / Protein: x / Nitrite: x   Leuk Esterase: x / RBC: x / WBC x   Sq Epi: x / Non Sq Epi: x / Bacteria: x      Assessment and Plan:  Hypovolemic and septic shock - resolved, off levophed  Suspected Acute upper GI bleed s/p 2 units pRBC  Possible duodenal perforation sp surgical evaluation  - CT A/P 9/29 - 1. New foci of extraluminal air along posterior wall of third portion duodenum, most consistent with perforated ulcer. 2. Diffuse circumferential wall thickening of the colon and rectum, nonspecific. Correlate for proctocolitis.3. Bilateral lower lobe and right middle lobe multifocal pneumonia.  - repeat CTAP w/ oral and IV contrast: No contrast extravasation, Previous seen foci of retroperitoneal gas no longer identified.  - s/p 2u PRBC since admission, Hb stable  - off levophed, on midodrine  - Surgery: no plans for any surgical intervention. Pt is tolerated feeds  - s/p GI eval: no intervention  - on PPI q12H, bowel regimen.   - CBC and active T&S    Acute hypoxemic respiratory failure s/p intubation, likely due to multifocal PNA/Possible Aspiration   - extubated 10/4, now off O2   - infectious work up unremarkable  - trach aspirate cx x2- no growth  - on meropenem for total of 8 days per ID (last dose tomorrow)  - continue puree w/ mildly thick liquids diet per s/s    NELA likely prerenal, resolved  - Cr 1.6 > 1.2 > 1.1 > 0.6   - barlow removed, monitor I&Os    NSTEMI likely type II due to shock   - Echo results 9/30 EF 55 to 60 %, Normal left systolic function Diastolic function could not be assessed.   - Trend trop: 0.34 -> 0.11     Dispo: Reg floor 58 year old female with a PMHx of Down syndrome, nonverbal at baseline, hypothyroidism, cerebral palsy, congenital pulmonary stenosis presents to the ED for hemoptysis.    Patient was recently admitted to the hospital for management of pneumonia and resultant hypoxic respiratory failure.  Patient was noted of having hematemesis when she was admitted previously, a CT of the abdomen showed esophagitis for which GI recommended OP upper endoscopy.    On the day of presentation, the patient was less responsive with noted hemoptysis.   When asking the group home personnel, patient was not exhibiting any signs of respiratory distress, abdominal pain, cough, shortness of breath, fevers/ sweating, or any changes in bowel habits.    In the ED, the patient was HD stable, afebrile.  Labs workup revealed Hb= 9.9, WBC= 11k, plt= 432, creatinine= 1.6.  pH= 7.19/ CO2= 55/ O2= 188/ 21.    Patient received 2 PRBCs, got intubated for airway protection where blood was noted in the trachea.  A CT of the abdomen showed foci of air in the lumen of the stomach, consistent with duodenal perforation.  The CT showed as well bibasilar infiltrates, consistent with pneumonia.    Surgery were consulted, as well as GI.  Patient was started on Protonix drip, and got admitted for management.    Today is hospital day 11d. This morning patient was seen and examined at bedside, resting comfortably in bed.    No acute or major events overnight.      PAST MEDICAL & SURGICAL HISTORY  Down syndrome    Osteoporosis    Mild anemia    Neuropathy    S/P debridement  of R hip on 3/2/21      SOCIAL HISTORY:  Social History:      ALLERGIES:  No Known Allergies    MEDICATIONS:  STANDING MEDICATIONS  bacitracin   Ointment 1 Application(s) Topical two times a day  chlorhexidine 2% Cloths 1 Application(s) Topical <User Schedule>  enoxaparin Injectable 40 milliGRAM(s) SubCutaneous every 24 hours  gabapentin 600 milliGRAM(s) Oral daily  lactated ringers. 500 milliLiter(s) IV Continuous <Continuous>  midodrine 10 milliGRAM(s) Oral every 8 hours  pantoprazole  Injectable 40 milliGRAM(s) IV Push two times a day  polyethylene glycol 3350 17 Gram(s) Oral two times a day  senna 2 Tablet(s) Oral at bedtime    PRN MEDICATIONS  acetaminophen     Tablet .. 650 milliGRAM(s) Oral every 6 hours PRN    VITALS:   T(F): 97.4  HR: 82  BP: 123/61  RR: 20  SpO2: 95%    PHYSICAL EXAM:  GENERAL:   ( X ) NAD, lying in bed comfortably     (  ) obtunded     (  ) lethargic     (  ) somnolent    HEAD:   ( X ) Atraumatic     (  ) hematoma     (  ) laceration (specify location:       )     NECK:  ( X ) Supple     (  ) neck stiffness     (  ) nuchal rigidity     (  )  no JVD     (  ) JVD present ( -- cm)    HEART:  Rate -->     ( X ) normal rate     (  ) bradycardic     (  ) tachycardic  Rhythm -->     ( X ) regular     (  ) regularly irregular     (  ) irregularly irregular  Murmurs -->     ( X ) normal s1s2     (  ) systolic murmur     (  ) diastolic murmur     (  ) continuous murmur      (  ) S3 present     (  ) S4 present    LUNGS:   ( X )Unlabored respirations     (  ) tachypnea  ( X ) B/L air entry     (  ) decreased breath sounds in:  (location     )    ( X ) no adventitious sound     (  ) crackles     (  ) wheezing      (  ) rhonchi      (specify location:       )  (  ) chest wall tenderness (specify location:       )    ABDOMEN:   ( X ) Soft     (  ) tense   |   (  ) nondistended     (  ) distended   |   (  ) +BS     (  ) hypoactive bowel sounds     (  ) hyperactive bowel sounds  ( X ) nontender     (  ) RUQ tenderness     (  ) RLQ tenderness     (  ) LLQ tenderness     (  ) epigastric tenderness     (  ) diffuse tenderness  (  ) Splenomegaly      (  ) Hepatomegaly      (  ) Jaundice     (  ) ecchymosis     EXTREMITIES:  ( X ) Normal     (  ) Rash     (  ) ecchymosis     (  ) varicose veins      (  ) pitting edema     (  ) non-pitting edema   (  ) ulceration     (  ) gangrene:     (location:     )    NERVOUS SYSTEM:  Unable to assess, patient non-compliant  (  ) A&Ox3     (  ) confused     (  ) lethargic  CN II-XII:     (  ) Intact     (  ) deficits found     (Specify:     )   Upper extremities:     (  ) no sensorimotor deficits     (  ) weakness     (  ) loss of proprioception/vibration     (  ) loss of touch/temperature (specify:    )  Lower extremities:     (  ) no sensorimotor deficits     (  ) weakness     (  ) loss of proprioception/vibration     (  ) loss of touch/temperature (specify:    )    SKIN:   ( X ) No rashes or lesions     (  ) maculopapular rash     (  ) pustules     (  ) vesicles     (  ) ulcer     (  ) ecchymosis     (specify location:     )      (  ) Indwelling Barlow Catheter:   Date insterted:    Reason (  ) Critical illness     (  ) urinary retention    (  ) Accurate Ins/Outs Monitoring     (  ) CMO patient    (  ) Central Line:   Date inserted:  Location: (  ) Right IJ     (  ) Left IJ     (  ) Right Fem     (  ) Left Fem    (  ) SPC        (  ) pigtail       (  ) PEG tube       (  ) colostomy       (  ) jejunostomy  (  ) U-Dall    LABS:                        11.9   4.92  )-----------( 399      ( 10 Oct 2023 04:50 )             39.2     10-10    141  |  101  |  12  ----------------------------<  92  4.9   |  29  |  0.8    Ca    9.0      10 Oct 2023 04:50  Mg     2.4     10-10    TPro  6.7  /  Alb  3.0<L>  /  TBili  0.2  /  DBili  x   /  AST  34  /  ALT  34  /  AlkPhos  101  10-10      Urinalysis Basic - ( 10 Oct 2023 04:50 )    Color: x / Appearance: x / SG: x / pH: x  Gluc: 92 mg/dL / Ketone: x  / Bili: x / Urobili: x   Blood: x / Protein: x / Nitrite: x   Leuk Esterase: x / RBC: x / WBC x   Sq Epi: x / Non Sq Epi: x / Bacteria: x      Assessment and Plan:  Hypovolemic and septic shock - resolved, off levophed  Suspected Acute upper GI bleed s/p 2 units pRBC  Possible duodenal perforation sp surgical evaluation  - CT A/P 9/29 - 1. New foci of extraluminal air along posterior wall of third portion duodenum, most consistent with perforated ulcer. 2. Diffuse circumferential wall thickening of the colon and rectum, nonspecific. Correlate for proctocolitis.3. Bilateral lower lobe and right middle lobe multifocal pneumonia.  - repeat CTAP w/ oral and IV contrast: No contrast extravasation, Previous seen foci of retroperitoneal gas no longer identified.  - s/p 2u PRBC since admission, Hb stable  - off levophed, on midodrine  - Surgery: no plans for any surgical intervention. Pt is tolerated feeds  - s/p GI eval: no intervention  - on PPI q12H, bowel regimen.   - CBC and active T&S    Acute hypoxemic respiratory failure s/p intubation, likely due to multifocal PNA/Possible Aspiration   - extubated 10/4, now off O2   - infectious work up unremarkable  - trach aspirate cx x2- no growth  - on meropenem for total of 8 days per ID (course ended)  - continue puree w/ mildly thick liquids diet per s/s    NELA likely prerenal, resolved  - Cr 1.6 > 1.2 > 1.1 > 0.6   - barlow removed, monitor I&Os    NSTEMI likely type II due to shock   - Echo results 9/30 EF 55 to 60 %, Normal left systolic function Diastolic function could not be assessed.   - Trend trop: 0.34 -> 0.11     Dispo: Reg floor 58 year old female with a PMHx of Down syndrome, nonverbal at baseline, hypothyroidism, cerebral palsy, congenital pulmonary stenosis presents to the ED for hemoptysis.    Patient was recently admitted to the hospital for management of pneumonia and resultant hypoxic respiratory failure.  Patient was noted of having hematemesis when she was admitted previously, a CT of the abdomen showed esophagitis for which GI recommended OP upper endoscopy.    On the day of presentation, the patient was less responsive with noted hemoptysis.   When asking the group home personnel, patient was not exhibiting any signs of respiratory distress, abdominal pain, cough, shortness of breath, fevers/ sweating, or any changes in bowel habits.    In the ED, the patient was HD stable, afebrile.  Labs workup revealed Hb= 9.9, WBC= 11k, plt= 432, creatinine= 1.6.  pH= 7.19/ CO2= 55/ O2= 188/ 21.    Patient received 2 PRBCs, got intubated for airway protection where blood was noted in the trachea.  A CT of the abdomen showed foci of air in the lumen of the stomach, consistent with duodenal perforation.  The CT showed as well bibasilar infiltrates, consistent with pneumonia.    Surgery were consulted, as well as GI.  Patient was started on Protonix drip, and got admitted for management.    Today is hospital day 11d. This morning patient was seen and examined at bedside, resting comfortably in bed.    No acute or major events overnight.      PAST MEDICAL & SURGICAL HISTORY  Down syndrome    Osteoporosis    Mild anemia    Neuropathy    S/P debridement  of R hip on 3/2/21      SOCIAL HISTORY:  Social History:      ALLERGIES:  No Known Allergies    MEDICATIONS:  STANDING MEDICATIONS  bacitracin   Ointment 1 Application(s) Topical two times a day  chlorhexidine 2% Cloths 1 Application(s) Topical <User Schedule>  enoxaparin Injectable 40 milliGRAM(s) SubCutaneous every 24 hours  gabapentin 600 milliGRAM(s) Oral daily  lactated ringers. 500 milliLiter(s) IV Continuous <Continuous>  midodrine 10 milliGRAM(s) Oral every 8 hours  pantoprazole  Injectable 40 milliGRAM(s) IV Push two times a day  polyethylene glycol 3350 17 Gram(s) Oral two times a day  senna 2 Tablet(s) Oral at bedtime    PRN MEDICATIONS  acetaminophen     Tablet .. 650 milliGRAM(s) Oral every 6 hours PRN    VITALS:   T(F): 97.4  HR: 82  BP: 123/61  RR: 20  SpO2: 95%    PHYSICAL EXAM:  GENERAL:   ( X ) NAD, lying in bed comfortably     (  ) obtunded     (  ) lethargic     (  ) somnolent    HEAD:   ( X ) Atraumatic     (  ) hematoma     (  ) laceration (specify location:       )     NECK:  ( X ) Supple     (  ) neck stiffness     (  ) nuchal rigidity     (  )  no JVD     (  ) JVD present ( -- cm)    HEART:  Rate -->     ( X ) normal rate     (  ) bradycardic     (  ) tachycardic  Rhythm -->     ( X ) regular     (  ) regularly irregular     (  ) irregularly irregular  Murmurs -->     ( X ) normal s1s2     (  ) systolic murmur     (  ) diastolic murmur     (  ) continuous murmur      (  ) S3 present     (  ) S4 present    LUNGS:   ( X )Unlabored respirations     (  ) tachypnea  ( X ) B/L air entry     (  ) decreased breath sounds in:  (location     )    ( X ) no adventitious sound     (  ) crackles     (  ) wheezing      (  ) rhonchi      (specify location:       )  (  ) chest wall tenderness (specify location:       )    ABDOMEN:   ( X ) Soft     (  ) tense   |   (  ) nondistended     (  ) distended   |   (  ) +BS     (  ) hypoactive bowel sounds     (  ) hyperactive bowel sounds  ( X ) nontender     (  ) RUQ tenderness     (  ) RLQ tenderness     (  ) LLQ tenderness     (  ) epigastric tenderness     (  ) diffuse tenderness  (  ) Splenomegaly      (  ) Hepatomegaly      (  ) Jaundice     (  ) ecchymosis     EXTREMITIES:  ( X ) Normal     (  ) Rash     (  ) ecchymosis     (  ) varicose veins      (  ) pitting edema     (  ) non-pitting edema   (  ) ulceration     (  ) gangrene:     (location:     )    NERVOUS SYSTEM:  Unable to assess, patient non-compliant  (  ) A&Ox3     (  ) confused     (  ) lethargic  CN II-XII:     (  ) Intact     (  ) deficits found     (Specify:     )   Upper extremities:     (  ) no sensorimotor deficits     (  ) weakness     (  ) loss of proprioception/vibration     (  ) loss of touch/temperature (specify:    )  Lower extremities:     (  ) no sensorimotor deficits     (  ) weakness     (  ) loss of proprioception/vibration     (  ) loss of touch/temperature (specify:    )    SKIN:   ( X ) No rashes or lesions     (  ) maculopapular rash     (  ) pustules     (  ) vesicles     (  ) ulcer     (  ) ecchymosis     (specify location:     )      (  ) Indwelling Barlow Catheter:   Date insterted:    Reason (  ) Critical illness     (  ) urinary retention    (  ) Accurate Ins/Outs Monitoring     (  ) CMO patient    (  ) Central Line:   Date inserted:  Location: (  ) Right IJ     (  ) Left IJ     (  ) Right Fem     (  ) Left Fem    (  ) SPC        (  ) pigtail       (  ) PEG tube       (  ) colostomy       (  ) jejunostomy  (  ) U-Dall    LABS:                        11.9   4.92  )-----------( 399      ( 10 Oct 2023 04:50 )             39.2     10-10    141  |  101  |  12  ----------------------------<  92  4.9   |  29  |  0.8    Ca    9.0      10 Oct 2023 04:50  Mg     2.4     10-10    TPro  6.7  /  Alb  3.0<L>  /  TBili  0.2  /  DBili  x   /  AST  34  /  ALT  34  /  AlkPhos  101  10-10      Urinalysis Basic - ( 10 Oct 2023 04:50 )    Color: x / Appearance: x / SG: x / pH: x  Gluc: 92 mg/dL / Ketone: x  / Bili: x / Urobili: x   Blood: x / Protein: x / Nitrite: x   Leuk Esterase: x / RBC: x / WBC x   Sq Epi: x / Non Sq Epi: x / Bacteria: x      Assessment and Plan:  Hypovolemic and septic shock - resolved, off levophed  Suspected Acute upper GI bleed s/p 2 units pRBC  Possible duodenal perforation sp surgical evaluation  - CT A/P 9/29 - 1. New foci of extraluminal air along posterior wall of third portion duodenum, most consistent with perforated ulcer. 2. Diffuse circumferential wall thickening of the colon and rectum, nonspecific. Correlate for proctocolitis.3. Bilateral lower lobe and right middle lobe multifocal pneumonia.  - repeat CTAP w/ oral and IV contrast: No contrast extravasation, Previous seen foci of retroperitoneal gas no longer identified.  - s/p 2u PRBC since admission, Hb stable  - off levophed, on midodrine  - Surgery: no plans for any surgical intervention. Pt is tolerated feeds  - s/p GI eval: no intervention  - on PPI q12H, bowel regimen.   - CBC and active T&S    Acute hypoxemic respiratory failure s/p intubation, likely due to multifocal PNA/Possible Aspiration   - extubated 10/4, now on 2L NC    - infectious work up unremarkable  - trach aspirate cx x2- no growth  - on meropenem for total of 8 days per ID (course ended)  - continue puree w/ mildly thick liquids diet per s/s    NELA likely prerenal, resolved  - Cr 1.6 > 1.2 > 1.1 > 0.6   - barlow removed, monitor I&Os    NSTEMI likely type II due to shock   - Echo results 9/30 EF 55 to 60 %, Normal left systolic function Diastolic function could not be assessed.   - Trend trop: 0.34 -> 0.11     Dispo: Reg floor

## 2023-10-10 NOTE — PROGRESS NOTE ADULT - PROBLEM SELECTOR PLAN 1
Contained D3 perforation.  Intubated and sedated  -continue PPI infusion  -continue meropenem  -monitor OG outputs  -monitor CBC, transfuse as needed  -vent management per ICU team.
Contained D3 perforation.  now extubated  -continue IV PPI  -continue IV antibiotics  -pressors management per ICU team  -monitor CBC, transfuse as needed
Contained D3 perforation.  Intubated and sedated  -continue IV PPI  -continue meropenem  -monitor OG outputs  -monitor CBC, transfuse as needed  -vent management per ICU team - possible medical extubation over next 24 hours
Contained D3 perforation.  now extubated  -continue IV PPI  -s/p pressors and IV antibiotics  -monitor CBC, transfuse as needed

## 2023-10-10 NOTE — PROGRESS NOTE ADULT - ASSESSMENT
IMPRESSION:    Acute hypoxemic respiratory failure  on HHFNC sp extubation slowly improving on NC  Suspected upper GI bleed s/p 2 units pRBC  Possible duodenal perforation sp surgical/ GI eval no intervention  aspiration pneumonia  polymicrobial bacteremia   NSTEMI likely type II   NELA likely prerenal resolved  H/o CP  H/o seizures    PLAN:    CNS: Avoid CNS depressant    HEENT: Oral care    PULMONARY: HOB at 45 degrees.  aspiration precaution, keep SaO2 92 TO 96%, on NC    CARDIOVASCULAR: ECHO noted.  Midodrine    GI: feeding Protonix BID.  feeding per speech    RENAL:    FU lytes.  Correct as needed.      INFECTIOUS DISEASE:  abx per ID,    HEMATOLOGICAL: DVT prophylaxis seq.  Monitor CBC.      ENDOCRINE:  Follow up FS.  Insulin protocol if needed.    MUSCULOSKELETAL: Bedrest.  Off loading.      Prognosis guarded.      floor  multiple skin ulcers  palliative care fup

## 2023-10-10 NOTE — PROGRESS NOTE ADULT - SUBJECTIVE AND OBJECTIVE BOX
Over Night Events: events noted, on NC, BP noted    PHYSICAL EXAM    ICU Vital Signs Last 24 Hrs  T(C): 36.2 (10 Oct 2023 04:00), Max: 37 (09 Oct 2023 20:00)  T(F): 97.1 (10 Oct 2023 04:00), Max: 98.6 (09 Oct 2023 20:00)  HR: 70 (10 Oct 2023 04:00) (61 - 91)  BP: 82/63 (10 Oct 2023 04:00) (82/63 - 118/72)  BP(mean): 68 (10 Oct 2023 04:00) (68 - 73)  RR: 20 (10 Oct 2023 04:00) (17 - 20)  SpO2: 95% (10 Oct 2023 04:00) (91% - 98%)    O2 Parameters below as of 09 Oct 2023 20:00  Patient On (Oxygen Delivery Method): room air            General: ill looking  Lungs: dec bs both bases  Cardiovascular: HARPREET 2.6  Abdomen: Soft, Positive BS  Contracted      10-09-23 @ 07:01  -  10-10-23 @ 07:00  --------------------------------------------------------  IN:    IV PiggyBack: 50 mL    Oral Fluid: 480 mL  Total IN: 530 mL    OUT:    Voided (mL): 1250 mL  Total OUT: 1250 mL    Total NET: -720 mL          LABS:                          11.9   4.92  )-----------( 399      ( 10 Oct 2023 04:50 )             39.2                                               10-10    141  |  101  |  12  ----------------------------<  92  4.9   |  29  |  0.8    Ca    9.0      10 Oct 2023 04:50  Mg     2.4     10-10    TPro  6.7  /  Alb  3.0<L>  /  TBili  0.2  /  DBili  x   /  AST  34  /  ALT  34  /  AlkPhos  101  10-10                                             Urinalysis Basic - ( 10 Oct 2023 04:50 )    Color: x / Appearance: x / SG: x / pH: x  Gluc: 92 mg/dL / Ketone: x  / Bili: x / Urobili: x   Blood: x / Protein: x / Nitrite: x   Leuk Esterase: x / RBC: x / WBC x   Sq Epi: x / Non Sq Epi: x / Bacteria: x                                                  LIVER FUNCTIONS - ( 10 Oct 2023 04:50 )  Alb: 3.0 g/dL / Pro: 6.7 g/dL / ALK PHOS: 101 U/L / ALT: 34 U/L / AST: 34 U/L / GGT: x                                                                                                                                       MEDICATIONS  (STANDING):  bacitracin   Ointment 1 Application(s) Topical two times a day  chlorhexidine 2% Cloths 1 Application(s) Topical <User Schedule>  enoxaparin Injectable 40 milliGRAM(s) SubCutaneous every 24 hours  gabapentin 600 milliGRAM(s) Oral daily  lactated ringers. 500 milliLiter(s) (500 mL/Hr) IV Continuous <Continuous>  midodrine 10 milliGRAM(s) Oral every 8 hours  pantoprazole  Injectable 40 milliGRAM(s) IV Push two times a day  polyethylene glycol 3350 17 Gram(s) Oral two times a day  senna 2 Tablet(s) Oral at bedtime    MEDICATIONS  (PRN):  acetaminophen     Tablet .. 650 milliGRAM(s) Oral every 6 hours PRN Temp greater or equal to 38C (100.4F), Mild Pain (1 - 3)

## 2023-10-10 NOTE — PROGRESS NOTE ADULT - NS ATTEST RISK PROBLEM GEN_ALL_CORE FT
1. Number and complexity of problems addressed for this patient:    1.1 Moderate (At least 1)  [ ] 1 or more chronic illnesses with exacerbation, progression, or side effects of treatment  [ ] 2 or more stable chronic illnesses  [ ] 1 undiagnosed new problem with uncertain prognosis  [ ] 1 acute illness with systemic symptoms  [ ] 1 acute complicated injury  1.2 High (At least 1)   [ ] 1 or more chronic illnesses with severe exacerbation, progression, or side effects of treatment  [x ] 1 acute or chronic illnesses or injuries that may pose a threat to life or bodily function    2. Amount and/or Complexity of Data that was Reviewed and Analyzed for this case:       Moderate (1 out of 3)       High (2 out of 3)  2.1. (Any combination of 3 of the following)   [ ] Prior External notes were reviewed  [ ] Each test result was reviewed (see "LABS" and "RADIOLOGY & ADDITIONAL STUDIES" above)  [ ] The following tests were ordered and/or reviewed (Only count 1 point for ordering or reviewing a unique test):  	[ ]CBC  	[ ] Chemistry   	[ ] Imaging   	[ ] Other:   [ ] Assessment requiring an independent historian   		Name of historian and relationship:   2.2  [x ] Personally review and interpretation of  image or testing   2.3  [ x] Discussion of management or test interpretation with external physician/other qualified health care professional\appropriate source (not separately reported)    3. Risk of Complications and/or Morbidity or Mortality of for this Patient’s Management:  3.1 Moderate risk of morbidity from additional diagnostic testing or treatment (At least 1):   [ ] Prescription drug management   [ ] Decision regarding minor surgery, treatment, or procedure with identified patient or procedure risk factors  [ ] Decision regarding elective major surgery, treatment, or procedure without identified patient or procedure risk factors   [ ] Diagnosis or treatment significantly limited by social determinants of health   [ ] Other:   3.2 High risk of morbidity from additional diagnostic testing or treatment (At least 1):   [ ] Drug therapy requiring intensive monitoring for toxicity   [ ] Decision regarding elective major surgery, treatment, or procedure with identified patient or procedure risk factors   [ ] Decision regarding emergency major surgery, treatment, or procedure   [ ] Decision regarding hospitalization or escalation of hospital-level of care  [ ] Decision not to resuscitate, not to intubate, or to de-escalate care because of poor prognosis   [ ] Decision to proceed or not with artificial nutrition   [ ] Parenteral controlled substance  [ ] Other:
1. Number and complexity of problems addressed for this patient:    1.1 Moderate (At least 1)  [ ] 1 or more chronic illnesses with exacerbation, progression, or side effects of treatment  [x] 2 or more stable chronic illnesses  [ ] 1 undiagnosed new problem with uncertain prognosis  [ ] 1 acute illness with systemic symptoms  [ ] 1 acute complicated injury  1.2 High (At least 1)   [ ] 1 or more chronic illnesses with severe exacerbation, progression, or side effects of treatment  [ ] 1 acute or chronic illnesses or injuries that may pose a threat to life or bodily function    2. Amount and/or Complexity of Data that was Reviewed and Analyzed for this case:       Moderate (1 out of 3)       High (2 out of 3)  2.1. (Any combination of 3 of the following)   [ ] Prior External notes were reviewed  [ ] Each test result was reviewed (see "LABS" and "RADIOLOGY & ADDITIONAL STUDIES" above)  [ ] The following tests were ordered and/or reviewed (Only count 1 point for ordering or reviewing a unique test):  	[ ]CBC  	[ ] Chemistry   	[ ] Imaging   	[ ] Other:   [ ] Assessment requiring an independent historian   		Name of historian and relationship:   2.2  [x ] Personally review and interpretation of  image or testing   2.3  [ x] Discussion of management or test interpretation with external physician/other qualified health care professional\appropriate source (not separately reported)    3. Risk of Complications and/or Morbidity or Mortality of for this Patient’s Management:  3.1 Moderate risk of morbidity from additional diagnostic testing or treatment (At least 1):   [ ] Prescription drug management   [ ] Decision regarding minor surgery, treatment, or procedure with identified patient or procedure risk factors  [ ] Decision regarding elective major surgery, treatment, or procedure without identified patient or procedure risk factors   [ ] Diagnosis or treatment significantly limited by social determinants of health   [ ] Other:   3.2 High risk of morbidity from additional diagnostic testing or treatment (At least 1):   [ ] Drug therapy requiring intensive monitoring for toxicity   [ ] Decision regarding elective major surgery, treatment, or procedure with identified patient or procedure risk factors   [ ] Decision regarding emergency major surgery, treatment, or procedure   [ ] Decision regarding hospitalization or escalation of hospital-level of care  [ ] Decision not to resuscitate, not to intubate, or to de-escalate care because of poor prognosis   [ ] Decision to proceed or not with artificial nutrition   [ ] Parenteral controlled substance  [ ] Other:
1. Number and complexity of problems addressed for this patient:    1.1 Moderate (At least 1)  [ ] 1 or more chronic illnesses with exacerbation, progression, or side effects of treatment  [ ] 2 or more stable chronic illnesses  [ ] 1 undiagnosed new problem with uncertain prognosis  [ ] 1 acute illness with systemic symptoms  [ ] 1 acute complicated injury  1.2 High (At least 1)   [ ] 1 or more chronic illnesses with severe exacerbation, progression, or side effects of treatment  [x ] 1 acute or chronic illnesses or injuries that may pose a threat to life or bodily function    2. Amount and/or Complexity of Data that was Reviewed and Analyzed for this case:       Moderate (1 out of 3)       High (2 out of 3)  2.1. (Any combination of 3 of the following)   [ ] Prior External notes were reviewed  [ ] Each test result was reviewed (see "LABS" and "RADIOLOGY & ADDITIONAL STUDIES" above)  [ ] The following tests were ordered and/or reviewed (Only count 1 point for ordering or reviewing a unique test):  	[ ]CBC  	[ ] Chemistry   	[ ] Imaging   	[ ] Other:   [ ] Assessment requiring an independent historian   		Name of historian and relationship:   2.2  [x ] Personally review and interpretation of  image or testing   2.3  [ x] Discussion of management or test interpretation with external physician/other qualified health care professional\appropriate source (not separately reported)    3. Risk of Complications and/or Morbidity or Mortality of for this Patient’s Management:  3.1 Moderate risk of morbidity from additional diagnostic testing or treatment (At least 1):   [ ] Prescription drug management   [ ] Decision regarding minor surgery, treatment, or procedure with identified patient or procedure risk factors  [ ] Decision regarding elective major surgery, treatment, or procedure without identified patient or procedure risk factors   [ ] Diagnosis or treatment significantly limited by social determinants of health   [ ] Other:   3.2 High risk of morbidity from additional diagnostic testing or treatment (At least 1):   [ ] Drug therapy requiring intensive monitoring for toxicity   [ ] Decision regarding elective major surgery, treatment, or procedure with identified patient or procedure risk factors   [ ] Decision regarding emergency major surgery, treatment, or procedure   [ ] Decision regarding hospitalization or escalation of hospital-level of care  [ ] Decision not to resuscitate, not to intubate, or to de-escalate care because of poor prognosis   [ ] Decision to proceed or not with artificial nutrition   [ ] Parenteral controlled substance  [ ] Other:
1. Number and complexity of problems addressed for this patient:    1.1 Moderate (At least 1)  [ ] 1 or more chronic illnesses with exacerbation, progression, or side effects of treatment  [ ] 2 or more stable chronic illnesses  [ ] 1 undiagnosed new problem with uncertain prognosis  [ ] 1 acute illness with systemic symptoms  [ ] 1 acute complicated injury  1.2 High (At least 1)   [ ] 1 or more chronic illnesses with severe exacerbation, progression, or side effects of treatment  [x ] 1 acute or chronic illnesses or injuries that may pose a threat to life or bodily function    2. Amount and/or Complexity of Data that was Reviewed and Analyzed for this case:       Moderate (1 out of 3)       High (2 out of 3)  2.1. (Any combination of 3 of the following)   [ ] Prior External notes were reviewed  [ ] Each test result was reviewed (see "LABS" and "RADIOLOGY & ADDITIONAL STUDIES" above)  [ ] The following tests were ordered and/or reviewed (Only count 1 point for ordering or reviewing a unique test):  	[ ]CBC  	[ ] Chemistry   	[ ] Imaging   	[ ] Other:   [ ] Assessment requiring an independent historian   		Name of historian and relationship:   2.2  [x ] Personally review and interpretation of  image or testing   2.3  [ x] Discussion of management or test interpretation with external physician/other qualified health care professional\appropriate source (not separately reported)    3. Risk of Complications and/or Morbidity or Mortality of for this Patient’s Management:  3.1 Moderate risk of morbidity from additional diagnostic testing or treatment (At least 1):   [ ] Prescription drug management   [ ] Decision regarding minor surgery, treatment, or procedure with identified patient or procedure risk factors  [ ] Decision regarding elective major surgery, treatment, or procedure without identified patient or procedure risk factors   [ ] Diagnosis or treatment significantly limited by social determinants of health   [ ] Other:   3.2 High risk of morbidity from additional diagnostic testing or treatment (At least 1):   [ ] Drug therapy requiring intensive monitoring for toxicity   [ ] Decision regarding elective major surgery, treatment, or procedure with identified patient or procedure risk factors   [ ] Decision regarding emergency major surgery, treatment, or procedure   [ ] Decision regarding hospitalization or escalation of hospital-level of care  [ ] Decision not to resuscitate, not to intubate, or to de-escalate care because of poor prognosis   [ ] Decision to proceed or not with artificial nutrition   [ ] Parenteral controlled substance  [ ] Other:

## 2023-10-10 NOTE — PROGRESS NOTE ADULT - ASSESSMENT
58yFemale with history of of cerebral palsy, down syndrome, severe intellectual disability, nonverbal at baseline, pulmonary stenosis presents with coffee ground emesis. Hospital course complicated by evidence of contained D3 perforation. Palliative care consulted for GOC.    Patient now on nasal cannula, being downgraded to floor.    Patient has history of developmental delay and lives in a group home.  Patient has a NOK listed in the chart, but primary team spoke with her, and she has no contact with the patient anymore.  All decisions regarding care goes through CAB at this time.     MEDD (morphine equivalent daily dose):    Education about palliative care provided to patient/family.  See Recs below.    Please call [phone] with questions or concerns 24/7.   We will sign off as patient is improving. Call back as needed.

## 2023-10-10 NOTE — PROGRESS NOTE ADULT - ASSESSMENT
KA58F with PMH of Down syndrome, cerebral palsy, severe intellectual disability, nonverbal at baseline, hypothyroidism, congenital pulmonary stenosis, chronic pressure ulcers, orthostatic hypotension, constipation, presents to the ED for coffee ground emesis and AMS. Patient was recently admitted to the hospital for management of pneumonia and resultant hypoxic respiratory failure. Patient admitted to MICU for presumptive upper GI bleed. Surgery consulted for CT findings of duodenal perforation. No acute intervention was done and patient was downgraded to SDU     Hypovolemic and septic shock - resolved, off levophed  Suspected Acute upper GI bleed s/p 2 units pRBC  Possible duodenal perforation sp surgical evaluation  - CT A/P 9/29 - 1. New foci of extraluminal air along posterior wall of third portion duodenum, most consistent with perforated ulcer. 2. Diffuse circumferential wall thickening of the colon and rectum, nonspecific. Correlate for proctocolitis.3. Bilateral lower lobe and right middle lobe multifocal pneumonia.  - repeat CTAP w/ oral and IV contrast: No contrast extravasation, Previous seen foci of retroperitoneal gas no longer identified.  - s/p 2u PRBC since admission, Hb stable  - off levophed, on midodrine  - Surgery: no plans for any surgical intervention. Pt is tolerated feeds  - s/p GI eval: no intervention  - on PPI q12H, bowel regimen. Monitor BM   - CBC and active T&S    Acute hypoxemic respiratory failure s/p intubation, likely due to multifocal PNA/Possible Aspiration   - extubated 10/4, now on room air, intermittently requires NC   - infectious work up unremarkable  - trach aspirate cx x2- no growth  - on meropenem for total of 8 days per ID   - continue puree w/ mildly thick liquids diet per s/s    NELA likely prerenal, resolved  - Cr 1.6 > 1.2 > 1.1  - barlow removed, monitor I&Os    NSTEMI likely type II due to shock   - Echo results 9/30 EF 55 to 60 %, Normal left systolic function Diastolic function could not be assessed.   - Trend trop: 0.34 -> 0.11   - c/w telemetry monitoring     Overall prognosis is poor    Pending: taper down supplemental 02, complete course of abx, monitor PO intake and BM     KA58F with PMH of Down syndrome, cerebral palsy, severe intellectual disability, nonverbal at baseline, hypothyroidism, congenital pulmonary stenosis, chronic pressure ulcers, orthostatic hypotension, constipation, presents to the ED for coffee ground emesis and AMS. Patient was recently admitted to the hospital for management of pneumonia and resultant hypoxic respiratory failure. Patient admitted to MICU for presumptive upper GI bleed. Surgery consulted for CT findings of duodenal perforation. No acute intervention was done and patient was downgraded to SDU     Hypovolemic and septic shock - resolved, off levophed  Suspected Acute upper GI bleed s/p 2 units pRBC  Possible duodenal perforation sp surgical evaluation  - CT A/P 9/29 - 1. New foci of extraluminal air along posterior wall of third portion duodenum, most consistent with perforated ulcer. 2. Diffuse circumferential wall thickening of the colon and rectum, nonspecific. Correlate for proctocolitis.3. Bilateral lower lobe and right middle lobe multifocal pneumonia.  - repeat CTAP w/ oral and IV contrast: No contrast extravasation, Previous seen foci of retroperitoneal gas no longer identified.  - s/p 2u PRBC since admission, Hb stable  - off levophed, on midodrine  - Surgery: no plans for any surgical intervention. Pt is tolerated feeds  - s/p GI eval: no intervention  - on PPI q12H, bowel regimen. Monitor BM   - CBC and active T&S    Acute hypoxemic respiratory failure s/p intubation, likely due to multifocal PNA/Possible Aspiration   - extubated 10/4, now on room air, intermittently requires NC   - infectious work up unremarkable  - trach aspirate cx x2- no growth  - on meropenem for total of 8 days per ID   - continue puree w/ mildly thick liquids diet per s/s    NELA likely prerenal, resolved  - Cr 1.6 > 1.2 > 1.1  - barlow removed, monitor I&Os    NSTEMI likely type II due to shock   - Echo results 9/30 EF 55 to 60 %, Normal left systolic function Diastolic function could not be assessed.   - Trend trop: 0.34 -> 0.11     Overall prognosis is poor    Pending: taper down supplemental 02, complete course of abx, monitor PO intake and BM

## 2023-10-10 NOTE — CHART NOTE - NSCHARTNOTESELECT_GEN_ALL_CORE
Follow-up/Nutrition Services
Event Note
Follow-up/Nutrition Services
Palliative Care - Social Work/Event Note
Transfer Note

## 2023-10-10 NOTE — PROGRESS NOTE ADULT - PROBLEM SELECTOR PLAN 2
-Patient remains intubated in setting of duodenal perf  -vent management and weaning per primary ICU team
-Patient remains intubated in setting of duodenal perf  -now extubated, requiring HFNC
-Patient remains intubated in setting of duodenal perf  -vent management and weaning per primary ICU team
-now extubated, requiring O2 via NC  -O2 management per primary team

## 2023-10-10 NOTE — PROGRESS NOTE ADULT - PROBLEM SELECTOR PLAN 4
Received fax from pharmacy requesting refill on pts medication(s). Pt was last seen in office on 3/24/2022  and has a follow up scheduled for Visit date not found. Will send request to  Winifred Patino  for patient.      Requested Prescriptions     Pending Prescriptions Disp Refills    venlafaxine (EFFEXOR XR) 150 MG extended release capsule [Pharmacy Med Name: venlafaxine  mg capsule,extended release 24 hr] 30 capsule 3     Sig: TAKE ONE CAPSULE BY MOUTH DAILY
-Full code  -ongoing medical management  -will follow.
-Full code  -ongoing medical management  -will sign off

## 2023-10-10 NOTE — PROGRESS NOTE ADULT - SUBJECTIVE AND OBJECTIVE BOX
HPI:  58 year old female with a PMHx of Down syndrome, nonverbal at baseline, hypothyroidism, cerebral palsy, congenital pulmonary stenosis presents to the ED for hemoptysis.    Interval history  -Patient seen at bedside  -She is now extubated and does not participate in exam     ADVANCE DIRECTIVES:     [x ] Full Code [ ] DNR  MOLST  [ ]  Living Will  [ ]   DECISION MAKER(s):  [ ] Health Care Proxy(s)  [x ] Surrogate(s)  [x ] Guardian           Name(s): Phone Number(s):  [facility]      BASELINE (I)ADL(s) (prior to admission):    [place]: [ ]Total  [ ] Moderate [ ]Dependent  Palliative Performance Status Version 2:         %    [email or web address]    Allergies    No Known Allergies    Intolerances    MEDICATIONS  (STANDING):  bacitracin   Ointment 1 Application(s) Topical two times a day  chlorhexidine 2% Cloths 1 Application(s) Topical <User Schedule>  enoxaparin Injectable 40 milliGRAM(s) SubCutaneous every 24 hours  gabapentin 600 milliGRAM(s) Oral daily  lactated ringers. 500 milliLiter(s) (500 mL/Hr) IV Continuous <Continuous>  midodrine 10 milliGRAM(s) Oral every 8 hours  pantoprazole  Injectable 40 milliGRAM(s) IV Push two times a day  polyethylene glycol 3350 17 Gram(s) Oral two times a day  senna 2 Tablet(s) Oral at bedtime    MEDICATIONS  (PRN):  acetaminophen     Tablet .. 650 milliGRAM(s) Oral every 6 hours PRN Temp greater or equal to 38C (100.4F), Mild Pain (1 - 3)        PRESENT SYMPTOMS: [x ]Unable to obtain due to poor mentation   Source if other than patient:  [ ]Family   [ ]Team     Pain: [ ]yes [ ]no  QOL impact -   Location -                    Aggravating factors -  Quality -  Radiation -  Timing-  Severity (0-10 scale):  Minimal acceptable level (0-10 scale):     CPOT:  0  https://www.[facility].org/getattachment/[email or web address]-7191o5940j8q/Critical-Care-Pain-Observation-Tool-(CPOT)    PAIN AD Score:   [email or web address]/painad.pdf (press ctrl +  left click to view)    Dyspnea:                           [ ]None[ ]Mild [ ]Moderate [ ]Severe     Respiratory Distress Observation Scale (RDOS): 0  A score of 0 to 2 signifies little or no respiratory distress, 3 signifies mild distress, scores 4 to 6 indicate moderate distress, and scores greater than 7 signify severe distress  [email or web address]-ejoxokoulsn-udwfgipn-[email or web address]    Anxiety:                             [ ]None[ ]Mild [ ]Moderate [ ]Severe   Fatigue:                             [ ]None[ ]Mild [ ]Moderate [ ]Severe   Nausea:                             [ ]None[ ]Mild [ ]Moderate [ ]Severe   Loss of appetite:              [ ]None[ ]Mild [ ]Moderate [ ]Severe   Constipation:                    [ ]None[ ]Mild [ ]Moderate [ ]Severe    Other Symptoms:  [ ]All other review of systems negative     Palliative Performance Status Version 2:         20%    [email or web address]_[email or web address]    PHYSICAL EXAM:  Vital Signs Last 24 Hrs  T(C): 36.2 (10 Oct 2023 11:30), Max: 37 (09 Oct 2023 20:00)  T(F): 97.2 (10 Oct 2023 11:30), Max: 98.6 (09 Oct 2023 20:00)  HR: 118 (10 Oct 2023 11:30) (61 - 118)  BP: 142/64 (10 Oct 2023 11:30) (82/63 - 142/64)  BP(mean): 92 (10 Oct 2023 11:30) (68 - 92)  RR: 20 (10 Oct 2023 11:30) (18 - 20)  SpO2: 96% (10 Oct 2023 11:30) (91% - 98%)    Parameters below as of 09 Oct 2023 20:00  Patient On (Oxygen Delivery Method): room air        GENERAL:  [ x] No acute distress [ ]Lethargic  [ ]Unarousable  [ ]Verbal  [x ]Non-Verbal [ ]Cachexia    BEHAVIORAL/PSYCH:  [ ]Alert and Oriented x  [ ] Anxiety [ ] Delirium [ ] Agitation [ x] Calm   EYES: [ ] No scleral icterus [ ] Scleral icterus [x ] Closed  ENMT:  [ ]Dry mouth  [ x]No external oral lesions [ ] No external ear or nose lesions  CARDIOVASCULAR:  [ ]Regular [ ]Irregular [x ]Tachy [ ]Not Tachy  [ ]Raheem [ ] Edema [ ] No edema  PULMONARY:  [ ]Tachypnea  [ ]Audible excessive secretions [x] No labored breathing [ ] labored breathing  GASTROINTESTINAL: [ ]Soft  [ ]Distended  [ ]Not distended [ ]Non tender [ ]Tender  MUSCULOSKELETAL: [ ]No clubbing [ ] clubbing  [x ] No cyanosis [ ] cyanosis  NEUROLOGIC: [ ]No focal deficits  [ ]Follows commands  [x ]Does not follow commands  [ ]Cognitive impairment  [ ]Dysphagia  [ ]Dysarthria  [ ]Paresis   SKIN: [ ] Jaundiced [ ] Non-jaundiced [ ]Rash [x]No Rash [ ] Warm [ ] Dry  MISC/LINES: [ x] ET tube [ ] Trach [ ]NGT/OGT [ ]PEG [ ]Madsen    LABS: reviewed by me                          11.9   4.92  )-----------( 399      ( 10 Oct 2023 04:50 )             39.2       10-10    141  |  101  |  12  ----------------------------<  92  4.9   |  29  |  0.8    Ca    9.0      10 Oct 2023 04:50  Mg     2.4     10-10    TPro  6.7  /  Alb  3.0<L>  /  TBili  0.2  /  DBili  x   /  AST  34  /  ALT  34  /  AlkPhos  101  10-10              Urinalysis Basic - ( 10 Oct 2023 04:50 )    Color: x / Appearance: x / SG: x / pH: x  Gluc: 92 mg/dL / Ketone: x  / Bili: x / Urobili: x   Blood: x / Protein: x / Nitrite: x   Leuk Esterase: x / RBC: x / WBC x   Sq Epi: x / Non Sq Epi: x / Bacteria: x                  CAPILLARY BLOOD GLUCOSE                  RADIOLOGY & ADDITIONAL STUDIES: reviewed by me    < from: Xray Chest 1 View- PORTABLE-Routine (Xray Chest 1 View- PORTABLE-Routine in AM.) (10.09.23 @ 05:52) >  Impression:    Bilateral opacities without significant change.      < end of copied text >          EKG: reviewed by me    < from: 12 Lead ECG (09.30.23 @ 06:50) >  Ventricular Rate 57 BPM    Atrial Rate 57 BPM    P-R Interval 120 ms    QRS Duration 64 ms    Q-T Interval 452 ms    QTC Calculation(Bazett) 439 ms    P Axis 3 degrees    R Axis 77 degrees    T Axis 69 degrees    Diagnosis Line Sinus bradycardia  Baseline artifact  Otherwise normal ECG    < end of copied text >          PROTEIN CALORIE MALNUTRITION PRESENT: [ ]mild [ ]moderate [ ]severe [ ]underweight [ ]morbid obesity  https://www.andeal.org/vault/2440/web/files/ONC/Table_Clinical%20Characteristics%20to%20Document%20Malnutrition-White%20JV%20et%20al%202012.pdf    Height (cm): 142.2 (08-12-23 @ 00:41), 142.2 (08-04-23 @ 09:15)  Weight (kg): 52.1 (08-15-23 @ 00:00), 74.4 (08-11-23 @ 21:15)  BMI (kg/m2): 25.8 (08-15-23 @ 00:00), 36.8 (08-12-23 @ 00:41), 36.8 (08-11-23 @ 21:15)  [ ]PPSV2 < or = to 30% [ ]significant weight loss  [ ]poor nutritional intake  [ ]anasarca      [ ]Artificial Nutrition      Palliative Care Spiritual/Emotional Screening Tool Question  Severity (0-4):                    OR                    [ x] Unable to determine. Will assess at later time if appropriate.  Score of 2 or greater indicates recommendation of Chaplaincy and/or SW referral  Chaplaincy Referral: [ ] Yes [ ] Refused [ ] Following     Caregiver [place]:  [ ] Yes [ ] No    OR    [x ] Unable to determine. Will assess at later time if appropriate.  Social Work Referral [ ]  Patient and Family Centered Care Referral [ ]    Anticipatory Grief Present: [ ] Yes [ ] No    OR     [ x] Unable to determine. Will assess at later time if appropriate.  Social Work Referral [ ]  Patient and Family Centered Care Referral [ ]    Patient discussed with primary medical team MD  Palliative care education provided to patient and/or family

## 2023-10-10 NOTE — PROGRESS NOTE ADULT - PROBLEM SELECTOR PLAN 3
-history of Down syndrome and cerebral palsy with severe intellectual disability  -Patient lives in a group home  -CAB makes decisions for patient at this time  -Any decisions about withdrawing care must go through OPWDD/MHLS/CAB
-history of Down syndrome and cerebral palsy with severe intellectual disability  -Patient lives in a group home  -Reached out to CAB about who possible decision makers may be.  Awaiting return phone call.   -Any decisions about withdrawing care must go through OPWDD/MHLS/CAB
-history of Down syndrome and cerebral palsy with severe intellectual disability  -Patient lives in a group home  -CAB makes decisions for patient at this time  -Any decisions about withdrawing care must go through OPWDD/MHLS/CAB
-history of Down syndrome and cerebral palsy with severe intellectual disability  -Patient lives in a group home  -CAB makes decisions for patient at this time  -Any decisions about withdrawing care must go through OPWDD/MHLS/CAB

## 2023-10-10 NOTE — SWALLOW BEDSIDE ASSESSMENT ADULT - SLP PERTINENT HISTORY OF CURRENT PROBLEM
Pt is a 58 year old female with a PMHx of Down syndrome, nonverbal at baseline, hypothyroidism, cerebral palsy, congenital pulmonary stenosis presents to the ED for hemoptysis. Pt recently admitted to the hospital for management of pneumonia and resultant hypoxic respiratory failure. Pt received 2 PRBCs, intubated for airway protection where blood was noted in the trachea. CT abdomen showed possible duodenal perforation (no intervention as per surgery) as well as bibasilar infiltrates, c/w PNA. Pt is being treated for hypovolemic and septic shock - resolved. Pt s/p extubation 10/4. Pt well known to SLP dept from current and previous admissions, FEES 10/5 while pt on HFNC, recs for puree, mildly thick liquids. Pt also w/ recent VFSS last month w/ recs for puree, thin liquids. Pt is a 58 year old female with a PMHx of Down syndrome, nonverbal at baseline, hypothyroidism, cerebral palsy, congenital pulmonary stenosis presents to the ED for hemoptysis. Pt recently admitted to the hospital for management of pneumonia and resultant hypoxic respiratory failure. Pt received 2 PRBCs, intubated for airway protection where blood was noted in the trachea. CT abdomen showed possible duodenal perforation (no intervention as per surgery) as well as bibasilar infiltrates, c/w PNA. Pt is being treated for hypovolemic and septic shock - resolved. Pt s/p extubation 10/4. Pt well known to SLP dept from current and previous admissions, FEES 10/5 while pt on HFNC, recs for puree, mildly thick liquids. Pt also w/ recent VFSS in August w/ recs for puree, thin liquids.

## 2023-10-10 NOTE — PROGRESS NOTE ADULT - SUBJECTIVE AND OBJECTIVE BOX
TEA ECHAVARRIA  58y Female    CHIEF COMPLAINT:    Patient is a 58y old  Female who presents with a chief complaint of Upper GI bleed (10 Oct 2023 07:20)    INTERVAL HPI/OVERNIGHT EVENTS:    Patient seen and examined. No acute events overnight. OVerall unchanged, on room air     ROS: All other systems are negative.    Vital Signs:    T(F): 97.2 (10-10-23 @ 11:30), Max: 98.6 (10-09-23 @ 20:00)  HR: 118 (10-10-23 @ 11:30) (61 - 118)  BP: 142/64 (10-10-23 @ 11:30) (82/63 - 142/64)  RR: 20 (10-10-23 @ 11:30) (18 - 20)  SpO2: 96% (10-10-23 @ 11:30) (91% - 98%)    09 Oct 2023 07:01  -  10 Oct 2023 07:00  --------------------------------------------------------  IN: 530 mL / OUT: 1250 mL / NET: -720 mL    Daily Weight in k.2 (10 Oct 2023 00:00)    PHYSICAL EXAM:    GENERAL:  NAD  SKIN: No rashes or lesions  HEENT: Atraumatic. Normocephalic.    NECK: Supple, No JVD.    PULMONARY: CTA B/L. No wheezing. No rales  CVS: Normal S1, S2. Rate and Rhythm are regular.   ABDOMEN/GI: Soft, Nontender, mildly distended; BS present  MSK:  No clubbing or cyanosis   NEUROLOGIC:  moves all extremities   PSYCH: Awake and nonverbal     Consultant(s) Notes Reviewed:  [x ] YES  [ ] NO  Care Discussed with Consultants/Other Providers [ x] YES  [ ] NO    LABS:                        11.9   4.92  )-----------( 399      ( 10 Oct 2023 04:50 )             39.2     141  |  101  |  12  ----------------------------<  92  4.9   |  29  |  0.8    Ca    9.0      10 Oct 2023 04:50  Mg     2.4     10-10    TPro  6.7  /  Alb  3.0<L>  /  TBili  0.2  /  DBili  x   /  AST  34  /  ALT  34  /  AlkPhos  101  10-1    RADIOLOGY & ADDITIONAL TESTS:  Imaging or report Personally Reviewed:  [x] YES  [ ] NO  EKG reviewed: [x] YES  [ ] NO    Medications:  Standing  bacitracin   Ointment 1 Application(s) Topical two times a day  chlorhexidine 2% Cloths 1 Application(s) Topical <User Schedule>  enoxaparin Injectable 40 milliGRAM(s) SubCutaneous every 24 hours  gabapentin 600 milliGRAM(s) Oral daily  lactated ringers. 500 milliLiter(s) IV Continuous <Continuous>  midodrine 10 milliGRAM(s) Oral every 8 hours  pantoprazole  Injectable 40 milliGRAM(s) IV Push two times a day  polyethylene glycol 3350 17 Gram(s) Oral two times a day  senna 2 Tablet(s) Oral at bedtime    PRN Meds  acetaminophen     Tablet .. 650 milliGRAM(s) Oral every 6 hours PRN

## 2023-10-10 NOTE — PROGRESS NOTE ADULT - TIME BILLING
Coordination of care
Patient seen at bedside time spent evaluating and treating the patient's acute illness and medical conditions as well as time spent reviewing labs, radiology, discussing with patient and/or patient's family, and discussing the case on multidisciplinary rounds.
Coordination of care

## 2023-10-11 LAB
ANION GAP SERPL CALC-SCNC: 9 MMOL/L — SIGNIFICANT CHANGE UP (ref 7–14)
BASOPHILS # BLD AUTO: 0.1 K/UL — SIGNIFICANT CHANGE UP (ref 0–0.2)
BASOPHILS NFR BLD AUTO: 2.2 % — HIGH (ref 0–1)
BUN SERPL-MCNC: 14 MG/DL — SIGNIFICANT CHANGE UP (ref 10–20)
CALCIUM SERPL-MCNC: 8.9 MG/DL — SIGNIFICANT CHANGE UP (ref 8.4–10.4)
CHLORIDE SERPL-SCNC: 101 MMOL/L — SIGNIFICANT CHANGE UP (ref 98–110)
CO2 SERPL-SCNC: 28 MMOL/L — SIGNIFICANT CHANGE UP (ref 17–32)
CREAT SERPL-MCNC: 0.8 MG/DL — SIGNIFICANT CHANGE UP (ref 0.7–1.5)
EGFR: 86 ML/MIN/1.73M2 — SIGNIFICANT CHANGE UP
EOSINOPHIL # BLD AUTO: 0.2 K/UL — SIGNIFICANT CHANGE UP (ref 0–0.7)
EOSINOPHIL NFR BLD AUTO: 4.5 % — SIGNIFICANT CHANGE UP (ref 0–8)
GLUCOSE SERPL-MCNC: 94 MG/DL — SIGNIFICANT CHANGE UP (ref 70–99)
HCT VFR BLD CALC: 37 % — SIGNIFICANT CHANGE UP (ref 37–47)
HGB BLD-MCNC: 11.4 G/DL — LOW (ref 12–16)
IMM GRANULOCYTES NFR BLD AUTO: 0.2 % — SIGNIFICANT CHANGE UP (ref 0.1–0.3)
LYMPHOCYTES # BLD AUTO: 1.5 K/UL — SIGNIFICANT CHANGE UP (ref 1.2–3.4)
LYMPHOCYTES # BLD AUTO: 33.4 % — SIGNIFICANT CHANGE UP (ref 20.5–51.1)
MAGNESIUM SERPL-MCNC: 2.3 MG/DL — SIGNIFICANT CHANGE UP (ref 1.8–2.4)
MCHC RBC-ENTMCNC: 22.7 PG — LOW (ref 27–31)
MCHC RBC-ENTMCNC: 30.8 G/DL — LOW (ref 32–37)
MCV RBC AUTO: 73.6 FL — LOW (ref 81–99)
MONOCYTES # BLD AUTO: 0.68 K/UL — HIGH (ref 0.1–0.6)
MONOCYTES NFR BLD AUTO: 15.1 % — HIGH (ref 1.7–9.3)
NEUTROPHILS # BLD AUTO: 2 K/UL — SIGNIFICANT CHANGE UP (ref 1.4–6.5)
NEUTROPHILS NFR BLD AUTO: 44.6 % — SIGNIFICANT CHANGE UP (ref 42.2–75.2)
NRBC # BLD: 0 /100 WBCS — SIGNIFICANT CHANGE UP (ref 0–0)
PLATELET # BLD AUTO: 423 K/UL — HIGH (ref 130–400)
PMV BLD: 10 FL — SIGNIFICANT CHANGE UP (ref 7.4–10.4)
POTASSIUM SERPL-MCNC: 4.7 MMOL/L — SIGNIFICANT CHANGE UP (ref 3.5–5)
POTASSIUM SERPL-SCNC: 4.7 MMOL/L — SIGNIFICANT CHANGE UP (ref 3.5–5)
RBC # BLD: 5.03 M/UL — SIGNIFICANT CHANGE UP (ref 4.2–5.4)
RBC # FLD: 24.1 % — HIGH (ref 11.5–14.5)
SODIUM SERPL-SCNC: 138 MMOL/L — SIGNIFICANT CHANGE UP (ref 135–146)
WBC # BLD: 4.49 K/UL — LOW (ref 4.8–10.8)
WBC # FLD AUTO: 4.49 K/UL — LOW (ref 4.8–10.8)

## 2023-10-11 PROCEDURE — 99232 SBSQ HOSP IP/OBS MODERATE 35: CPT

## 2023-10-11 RX ORDER — SODIUM CHLORIDE 9 MG/ML
1000 INJECTION, SOLUTION INTRAVENOUS
Refills: 0 | Status: DISCONTINUED | OUTPATIENT
Start: 2023-10-11 | End: 2023-10-13

## 2023-10-11 RX ADMIN — MIDODRINE HYDROCHLORIDE 10 MILLIGRAM(S): 2.5 TABLET ORAL at 13:20

## 2023-10-11 RX ADMIN — PANTOPRAZOLE SODIUM 40 MILLIGRAM(S): 20 TABLET, DELAYED RELEASE ORAL at 17:59

## 2023-10-11 RX ADMIN — MIDODRINE HYDROCHLORIDE 10 MILLIGRAM(S): 2.5 TABLET ORAL at 22:27

## 2023-10-11 RX ADMIN — ENOXAPARIN SODIUM 40 MILLIGRAM(S): 100 INJECTION SUBCUTANEOUS at 13:20

## 2023-10-11 RX ADMIN — POLYETHYLENE GLYCOL 3350 17 GRAM(S): 17 POWDER, FOR SOLUTION ORAL at 05:03

## 2023-10-11 RX ADMIN — Medication 1 APPLICATION(S): at 05:03

## 2023-10-11 RX ADMIN — GABAPENTIN 600 MILLIGRAM(S): 400 CAPSULE ORAL at 13:19

## 2023-10-11 RX ADMIN — Medication 1 APPLICATION(S): at 17:58

## 2023-10-11 RX ADMIN — CHLORHEXIDINE GLUCONATE 1 APPLICATION(S): 213 SOLUTION TOPICAL at 05:08

## 2023-10-11 RX ADMIN — SODIUM CHLORIDE 75 MILLILITER(S): 9 INJECTION, SOLUTION INTRAVENOUS at 09:40

## 2023-10-11 RX ADMIN — MIDODRINE HYDROCHLORIDE 10 MILLIGRAM(S): 2.5 TABLET ORAL at 05:02

## 2023-10-11 RX ADMIN — PANTOPRAZOLE SODIUM 40 MILLIGRAM(S): 20 TABLET, DELAYED RELEASE ORAL at 05:02

## 2023-10-11 NOTE — SWALLOW BEDSIDE ASSESSMENT ADULT - SLP PERTINENT HISTORY OF CURRENT PROBLEM
Pt is a 58 year old female with a PMHx of Down syndrome, nonverbal at baseline, hypothyroidism, cerebral palsy, congenital pulmonary stenosis presents to the ED for hemoptysis. Pt recently admitted to the hospital for management of pneumonia and resultant hypoxic respiratory failure. Pt received 2 PRBCs, intubated for airway protection where blood was noted in the trachea. CT abdomen showed possible duodenal perforation (no intervention as per surgery) as well as bibasilar infiltrates, c/w PNA. Pt is being treated for hypovolemic and septic shock - resolved. Pt s/p extubation 10/4. Pt well known to SLP dept from current and previous admissions, FEES 10/5 while pt on HFNC, recs for puree, mildly thick liquids. Pt also w/ recent VFSS in August w/ recs for puree, thin liquids. Detail Level: Detailed

## 2023-10-11 NOTE — SWALLOW BEDSIDE ASSESSMENT ADULT - SWALLOW EVAL: RECOMMENDED FEEDING/EATING TECHNIQUES
oral hygiene/position upright (90 degrees)/small sips/bites
via cup/tsp/oral hygiene/position upright (90 degrees)/small sips/bites
oral hygiene/position upright (90 degrees)/small sips/bites
allow for swallow between intakes/crush medication (when feasible)/maintain upright posture during/after eating for 30 mins/no straws/oral hygiene/position upright (90 degrees)/small sips/bites

## 2023-10-11 NOTE — PROGRESS NOTE ADULT - ASSESSMENT
Clara Linton is 58 y.o female with PMH of Down syndrome, Cerebral palsy, severe intellectual disability, nonverbal at baseline, hypothyroidism, congenital pulmonary stenosis, chronic pressure ulcers, orthostatic hypotension, constipation who presented to ED for coffee ground emesis and AMS. Patient has recent admission for management of pneumonia and hypoxic respiratory failure. She was downgraded from MICU after GI and Surgery were consulted and recommended no acute intervention.     #Septic/Hypovolemic Shock  #Suspected Acute Upper GIB s/p 2 units pRBC  - CT A/P 9/29 - 1. New foci of extraluminal air along posterior wall of third portion duodenum, most consistent with perforated ulcer. 2. Diffuse circumferential wall thickening of the colon and rectum, nonspecific. Correlate for proctocolitis.3. Bilateral lower lobe and right middle lobe multifocal pneumonia.  - repeat CTAP w/ oral and IV contrast: No contrast extravasation, Previous seen foci of retroperitoneal gas no longer identified.  - s/p 2 units pRBC, Hb stable  - off levophed and midodrine  - Surgery consulted no plans for intervention  - GI: no intervention  - on PPI q12h, bowel regimen, monitor Bms  - CBC  - Keep active T&S    #Acute Hypoxemic RF s/p Intubation likely 2/2 Multifocal PNA  - intubated 10/__  - extubated 10/4, now on RA  - no growth in trach aspirate cultures x2  - completed 8 day course of meropenem  - c/w puree diet with thin liquids    #NELA (resolved) likely Prerenal  - Cr 1.6 --> 1.2 --> 1.1    #NSTEMI (Type II demand ischemia)  - Echo 9/30: LVEF 55-60%, normal systolic function, diastolic function could not be assessed                                           --------------------------------------------------------------    # DVT prophylaxis: None  # GI prophylaxis: PPI IV 40mg BID  # Diet: Pureed, thin liquids  # Activity:   # Code status: Full Code  # Disposition:    Pending:

## 2023-10-11 NOTE — PROGRESS NOTE ADULT - ASSESSMENT
58F with PMH of Down syndrome, cerebral palsy, severe intellectual disability, nonverbal at baseline, hypothyroidism, congenital pulmonary stenosis, chronic pressure ulcers, orthostatic hypotension, constipation, presents to the ED for coffee ground emesis and AMS. Patient was recently admitted to the hospital for management of pneumonia and resultant hypoxic respiratory failure. Patient admitted to MICU for presumptive upper GI bleed. Surgery consulted for CT findings of duodenal perforation. No acute intervention was done and patient was downgraded to SDU     Hypovolemic and septic shock - resolved, off levophed  Suspected Acute upper GI bleed s/p 2 units pRBC  Possible duodenal perforation sp surgical evaluation  - CT A/P 9/29 - 1. New foci of extraluminal air along posterior wall of third portion duodenum, most consistent with perforated ulcer. 2. Diffuse circumferential wall thickening of the colon and rectum, nonspecific. Correlate for proctocolitis.3. Bilateral lower lobe and right middle lobe multifocal pneumonia.  - repeat CTAP w/ oral and IV contrast: No contrast extravasation, Previous seen foci of retroperitoneal gas no longer identified.  - s/p 2u PRBC since admission, Hb stable  - off levophed, on midodrine  - Surgery: no plans for any surgical intervention. Pt tolerated feeds  - s/p GI eval: no intervention  - on PPI q12H, bowel regimen. Monitor BM   - CBC and active T&S    Acute hypoxemic respiratory failure s/p intubation, likely due to multifocal PNA/Possible Aspiration   - extubated 10/4, now on room air, intermittently requires NC   - infectious work up unremarkable  - trach aspirate cx x2- no growth  - completed meropenem for total of 8 days per ID   - continue puree w/ mildly thick liquids diet per s/s    NELA likely prerenal, resolved  - Cr 1.6 --> 1.2-- > 1.1  - barlow removed,    NSTEMI likely type II due to shock   - Echo results 9/30 EF 55 to 60 %, Normal left systolic function Diastolic function could not be assessed.   - Trend trop: 0.34 -> 0.11      Down's syndrome with cerebral palsy-- non verbal  Overall prognosis is poor    DC plan based on bowel movements and po intake in next 24- 48 hrs

## 2023-10-11 NOTE — SWALLOW BEDSIDE ASSESSMENT ADULT - ORAL PHASE
Within functional limits
Delayed oral transit time

## 2023-10-11 NOTE — SWALLOW BEDSIDE ASSESSMENT ADULT - SWALLOW EVAL: RECOMMENDED DIET
continue puree, thin liquids
continue puree w/ mildly thick liquids
Puree, NO liquids.
puree, thin liquids

## 2023-10-11 NOTE — SWALLOW BEDSIDE ASSESSMENT ADULT - SWALLOW EVAL: DIAGNOSIS
+toleration of puree and mildly thick liquids w/o overt s/s of penetration/aspiration
+toleration for thin liquids, puree consistency w/o overt s/s aspiration/penetration
+toleration for thin liquids, puree consistency w/o overt s/s aspiration/penetration
Increased risk for aspiration given respiratory status (currently on HFNC 50/50) and recent extubation (intubated ~6days). Overt s/s for thins and mildly thick liquids. + toleration observed without overt symptoms of penetration/aspiration for Puree.

## 2023-10-11 NOTE — PROGRESS NOTE ADULT - SUBJECTIVE AND OBJECTIVE BOX
SUBJECTIVE:    Patient is a 58y old Female who presents with a chief complaint of Upper GI bleed (11 Oct 2023 11:38)    Currently admitted to medicine with the primary diagnosis of GI bleed       Today is hospital day 12d.     PAST MEDICAL & SURGICAL HISTORY  Down syndrome    Osteoporosis    Mild anemia    Neuropathy    S/P debridement  of R hip on 3/2/21      ALLERGIES:  No Known Allergies    MEDICATIONS:  STANDING MEDICATIONS  bacitracin   Ointment 1 Application(s) Topical two times a day  chlorhexidine 2% Cloths 1 Application(s) Topical <User Schedule>  enoxaparin Injectable 40 milliGRAM(s) SubCutaneous every 24 hours  gabapentin 600 milliGRAM(s) Oral daily  lactated ringers. 1000 milliLiter(s) IV Continuous <Continuous>  midodrine 10 milliGRAM(s) Oral every 8 hours  pantoprazole  Injectable 40 milliGRAM(s) IV Push two times a day  polyethylene glycol 3350 17 Gram(s) Oral two times a day  senna 2 Tablet(s) Oral at bedtime    PRN MEDICATIONS  acetaminophen     Tablet .. 650 milliGRAM(s) Oral every 6 hours PRN    VITALS:   T(F): 96.7  HR: 92  BP: 111/58  RR: 18  SpO2: 98%    LABS:                        11.4   4.49  )-----------( 423      ( 11 Oct 2023 07:41 )             37.0     10-11    138  |  101  |  14  ----------------------------<  94  4.7   |  28  |  0.8    Ca    8.9      11 Oct 2023 07:41  Mg     2.3     10-11    TPro  6.7  /  Alb  3.0<L>  /  TBili  0.2  /  DBili  x   /  AST  34  /  ALT  34  /  AlkPhos  101  10-10      Urinalysis Basic - ( 11 Oct 2023 07:41 )    Color: x / Appearance: x / SG: x / pH: x  Gluc: 94 mg/dL / Ketone: x  / Bili: x / Urobili: x   Blood: x / Protein: x / Nitrite: x   Leuk Esterase: x / RBC: x / WBC x   Sq Epi: x / Non Sq Epi: x / Bacteria: x                RADIOLOGY:    PHYSICAL EXAM:  GEN: No acute distress  LUNGS: Clear to auscultation bilaterally   HEART: S1/S2 present. RRR.   ABD/ GI: Soft, non-tender, non-distended. Bowel sounds present  EXT: contracted  NEURO: Awake and alert

## 2023-10-11 NOTE — PROGRESS NOTE ADULT - SUBJECTIVE AND OBJECTIVE BOX
24H events:    Patient is a 58y old Female who presents with a chief complaint of Upper GI bleed (10 Oct 2023 11:57)    Primary diagnosis of GI bleed    Today is hospital day 12d. This morning patient was seen and examined at bedside, resting comfortably in bed.    No acute or major events overnight.   Unable to elicit patient's complaints as she is non-verbal at baseline.     Code Status:    Family communication:  Contact date:  Name of person contacted:  Relationship to patient:  Communication details:  What matters most:    PAST MEDICAL & SURGICAL HISTORY  Down syndrome    Osteoporosis    Mild anemia    Neuropathy    S/P debridement  of R hip on 3/2/21      SOCIAL HISTORY:  Social History:      ALLERGIES:  No Known Allergies    MEDICATIONS:  STANDING MEDICATIONS  bacitracin   Ointment 1 Application(s) Topical two times a day  chlorhexidine 2% Cloths 1 Application(s) Topical <User Schedule>  enoxaparin Injectable 40 milliGRAM(s) SubCutaneous every 24 hours  gabapentin 600 milliGRAM(s) Oral daily  lactated ringers. 1000 milliLiter(s) IV Continuous <Continuous>  midodrine 10 milliGRAM(s) Oral every 8 hours  pantoprazole  Injectable 40 milliGRAM(s) IV Push two times a day  polyethylene glycol 3350 17 Gram(s) Oral two times a day  senna 2 Tablet(s) Oral at bedtime    PRN MEDICATIONS  acetaminophen     Tablet .. 650 milliGRAM(s) Oral every 6 hours PRN    VITALS:   T(F): 96.7  HR: 72  BP: 98/55  RR: 20  SpO2: 95%    PHYSICAL EXAM:  GENERAL:   (x  ) NAD, lying in bed comfortably     (  ) obtunded     (  ) lethargic     (  ) somnolent    HEAD:   ( x ) Atraumatic     (  ) hematoma     (  ) laceration (specify location:       )     NECK:  ( x ) Supple     (  ) neck stiffness     (  ) nuchal rigidity     (  )  no JVD     (  ) JVD present ( -- cm)    HEART:  Rate -->     (  ) normal rate     (  ) bradycardic     (  ) tachycardic  Rhythm -->     ( x ) regular     (  ) regularly irregular     (  ) irregularly irregular  Murmurs -->     (  ) normal s1s2     (  ) systolic murmur     (  ) diastolic murmur     (  ) continuous murmur      (  ) S3 present     (  ) S4 present    LUNGS:   (  )Unlabored respirations     (  ) tachypnea  ( x ) B/L air entry     (  ) decreased breath sounds in:  (location     )    (  ) no adventitious sound     (  ) crackles     (  ) wheezing      (  ) rhonchi      (specify location:       )  (  ) chest wall tenderness (specify location:       )    ABDOMEN:   (x  ) Soft     (  ) tense   |   (x  ) nondistended     (  ) distended   |   (  ) +BS     (  ) hypoactive bowel sounds     (  ) hyperactive bowel sounds  (x  ) nontender     (  ) RUQ tenderness     (  ) RLQ tenderness     (  ) LLQ tenderness     (  ) epigastric tenderness     (  ) diffuse tenderness  (  ) Splenomegaly      (  ) Hepatomegaly      (  ) Jaundice     (  ) ecchymosis     EXTREMITIES:  (  ) Normal     (  ) Rash     (  ) ecchymosis     (  ) varicose veins      (  ) pitting edema     (  ) non-pitting edema   (  ) ulceration     (  ) gangrene:     (location:     )    NERVOUS SYSTEM:    (  ) A&Ox3     (  ) confused     (  ) lethargic  CN II-XII:     (  ) Intact     (  ) deficits found     (Specify:     )   Upper extremities:     (  ) no sensorimotor deficits     (  ) weakness     (  ) loss of proprioception/vibration     (  ) loss of touch/temperature (specify:    )  Lower extremities:     (  ) no sensorimotor deficits     (  ) weakness     (  ) loss of proprioception/vibration     (  ) loss of touch/temperature (specify:    )    SKIN:   (  ) No rashes or lesions     (  ) maculopapular rash     (  ) pustules     (  ) vesicles     (  ) ulcer     (  ) ecchymosis     (specify location:     )    AMPAC score:    (  ) Indwelling Madsen Catheter:   Date insterted:    Reason (  ) Critical illness     (  ) urinary retention    (  ) Accurate Ins/Outs Monitoring     (  ) CMO patient    (  ) Central Line:   Date inserted:  Location: (  ) Right IJ     (  ) Left IJ     (  ) Right Fem     (  ) Left Fem    (  ) SPC        (  ) pigtail       (  ) PEG tube       (  ) colostomy       (  ) jejunostomy  (  ) U-Dall    LABS:                        11.4   4.49  )-----------( 423      ( 11 Oct 2023 07:41 )             37.0     10-11    138  |  101  |  14  ----------------------------<  94  4.7   |  28  |  0.8    Ca    8.9      11 Oct 2023 07:41  Mg     2.3     10-11    TPro  6.7  /  Alb  3.0<L>  /  TBili  0.2  /  DBili  x   /  AST  34  /  ALT  34  /  AlkPhos  101  10-10      Urinalysis Basic - ( 11 Oct 2023 07:41 )    Color: x / Appearance: x / SG: x / pH: x  Gluc: 94 mg/dL / Ketone: x  / Bili: x / Urobili: x   Blood: x / Protein: x / Nitrite: x   Leuk Esterase: x / RBC: x / WBC x   Sq Epi: x / Non Sq Epi: x / Bacteria: x                RADIOLOGY:           24H events:    Patient is a 58y old Female who presents with a chief complaint of Upper GI bleed (10 Oct 2023 11:57)    Primary diagnosis of GI bleed    Today is hospital day 12d. This morning patient was seen and examined at bedside, resting comfortably in bed.    No acute or major events overnight.   Unable to elicit patient's complaints as she is non-verbal at baseline.     Code Status:    Family communication:  Contact date:  Name of person contacted:  Relationship to patient:  Communication details:  What matters most:    PAST MEDICAL & SURGICAL HISTORY  Down syndrome    Osteoporosis    Mild anemia    Neuropathy    S/P debridement  of R hip on 3/2/21      SOCIAL HISTORY:  Social History:      ALLERGIES:  No Known Allergies    MEDICATIONS:  STANDING MEDICATIONS  bacitracin   Ointment 1 Application(s) Topical two times a day  chlorhexidine 2% Cloths 1 Application(s) Topical <User Schedule>  enoxaparin Injectable 40 milliGRAM(s) SubCutaneous every 24 hours  gabapentin 600 milliGRAM(s) Oral daily  lactated ringers. 1000 milliLiter(s) IV Continuous <Continuous>  midodrine 10 milliGRAM(s) Oral every 8 hours  pantoprazole  Injectable 40 milliGRAM(s) IV Push two times a day  polyethylene glycol 3350 17 Gram(s) Oral two times a day  senna 2 Tablet(s) Oral at bedtime    PRN MEDICATIONS  acetaminophen     Tablet .. 650 milliGRAM(s) Oral every 6 hours PRN    VITALS:   T(F): 96.7  HR: 72  BP: 98/55  RR: 20  SpO2: 95%    PHYSICAL EXAM:  GENERAL:   (x  ) NAD, lying in bed comfortably     (  ) obtunded     (  ) lethargic     (  ) somnolent    HEAD:   ( x ) Atraumatic     (  ) hematoma     (  ) laceration (specify location:       )     NECK:  ( x ) Supple     (  ) neck stiffness     (  ) nuchal rigidity     (  )  no JVD     (  ) JVD present ( -- cm)    HEART:  Rate -->     (  ) normal rate     (  ) bradycardic     (  ) tachycardic  Rhythm -->     ( x ) regular     (  ) regularly irregular     (  ) irregularly irregular  Murmurs -->     (  ) normal s1s2     (  ) systolic murmur     (  ) diastolic murmur     (  ) continuous murmur      (  ) S3 present     (  ) S4 present    LUNGS:   (  )Unlabored respirations     (  ) tachypnea  ( x ) B/L air entry     (  ) decreased breath sounds in:  (location     )    (  ) no adventitious sound     (  ) crackles     (  ) wheezing      (  ) rhonchi      (specify location:       )  (  ) chest wall tenderness (specify location:       )    ABDOMEN:   (x  ) Soft     (  ) tense   |   (x  ) nondistended     (  ) distended   |   (  ) +BS     (  ) hypoactive bowel sounds     (  ) hyperactive bowel sounds  (x  ) nontender     (  ) RUQ tenderness     (  ) RLQ tenderness     (  ) LLQ tenderness     (  ) epigastric tenderness     (  ) diffuse tenderness  (  ) Splenomegaly      (  ) Hepatomegaly      (  ) Jaundice     (  ) ecchymosis     EXTREMITIES:  (  ) Normal     (  ) Rash     (  ) ecchymosis     (  ) varicose veins      (  ) pitting edema     (  ) non-pitting edema   (  ) ulceration     (  ) gangrene:     (location:     )    NERVOUS SYSTEM:    (  ) A&Ox3     (  ) confused     (  ) lethargic  CN II-XII:     (  ) Intact     (  ) deficits found     (Specify:     )   Upper extremities:     (  ) no sensorimotor deficits     (  ) weakness     (  ) loss of proprioception/vibration     (  ) loss of touch/temperature (specify:    )  Lower extremities:     (  ) no sensorimotor deficits     (  ) weakness     (  ) loss of proprioception/vibration     (  ) loss of touch/temperature (specify:    )    SKIN:   (  ) No rashes or lesions     (  ) maculopapular rash     (  ) pustules     (  ) vesicles     (  ) ulcer     (  ) ecchymosis     (specify location:     )    AMPAC score:    (  ) Indwelling Madsen Catheter:   Date insterted:    Reason (  ) Critical illness     (  ) urinary retention    (  ) Accurate Ins/Outs Monitoring     (  ) CMO patient    (  ) Central Line:   Date inserted:  Location: (  ) Right IJ     (  ) Left IJ     (  ) Right Fem     (  ) Left Fem    (  ) SPC        (  ) pigtail       (  ) PEG tube       (  ) colostomy       (  ) jejunostomy  (  ) U-Dall    LABS:                        11.4   4.49  )-----------( 423      ( 11 Oct 2023 07:41 )             37.0     10-11    138  |  101  |  14  ----------------------------<  94  4.7   |  28  |  0.8    Ca    8.9      11 Oct 2023 07:41  Mg     2.3     10-11    TPro  6.7  /  Alb  3.0<L>  /  TBili  0.2  /  DBili  x   /  AST  34  /  ALT  34  /  AlkPhos  101  10-10      Urinalysis Basic - ( 11 Oct 2023 07:41 )    Color: x / Appearance: x / SG: x / pH: x  Gluc: 94 mg/dL / Ketone: x  / Bili: x / Urobili: x   Blood: x / Protein: x / Nitrite: x   Leuk Esterase: x / RBC: x / WBC x   Sq Epi: x / Non Sq Epi: x / Bacteria: x                RADIOLOGY:    < from: Xray Chest 1 View- PORTABLE-Routine (Xray Chest 1 View- PORTABLE-Routine in AM.) (10.09.23 @ 05:52) >    Impression:    Bilateral opacities without significant change.      < end of copied text >  < from: Xray Kidney Ureter Bladder (10.04.23 @ 10:09) >    IMPRESSION:    No evidence of a small bowel obstruction.    < end of copied text >    < from: CT Abdomen and Pelvis w/ Oral Cont and w/ IV Cont (10.01.23 @ 20:39) >    IMPRESSION:    1.  Extensive patchy bibasilar opacities/consolidations redemonstrated.   Bilateral pleural effusions partially imaged.  2.  Previous seen foci of retroperitoneal gas no longer identified.  3.  New cecal wall thickening, compatible with cecitis.  4.  Small ascites layering in the pelvis.  5.  Thickening/infiltrative changes of the soft tissues of the right   lateral thigh. Correlate with exam.    --- End of Report ---    < end of copied text >      < from: CT Chest No Cont (10.01.23 @ 20:17) >  IMPRESSION:    1. Since August 4, 2023, increased patchy bilateral consolidated   opacities, predominantly involving bilateral lower lobes and to a lesser   extent involving bilateral upper and right middle lobes, suspicious for   pneumonia, possibly related to prior aspiration event; abundant contrast   material is noted within the mid to lower esophagus, likely reflecting   reflux subsequent to prior contrast administration.  2. New small bilateral pleural effusions.      Please see separately dictated report of concurrently performed   abdominopelvic CT forreport of intra-abdominal findings.    < end of copied text >

## 2023-10-11 NOTE — SWALLOW BEDSIDE ASSESSMENT ADULT - SWALLOW EVAL: CURRENT DIET
puree diet w/ mildly thick liquids
puree, mildly thick liquids
puree, thin liquids
NPO pending speech/swallow

## 2023-10-12 ENCOUNTER — TRANSCRIPTION ENCOUNTER (OUTPATIENT)
Age: 58
End: 2023-10-12

## 2023-10-12 LAB
ALBUMIN SERPL ELPH-MCNC: 3.1 G/DL — LOW (ref 3.5–5.2)
ALP SERPL-CCNC: 96 U/L — SIGNIFICANT CHANGE UP (ref 30–115)
ALT FLD-CCNC: 30 U/L — SIGNIFICANT CHANGE UP (ref 0–41)
ANION GAP SERPL CALC-SCNC: 12 MMOL/L — SIGNIFICANT CHANGE UP (ref 7–14)
AST SERPL-CCNC: 32 U/L — SIGNIFICANT CHANGE UP (ref 0–41)
BASOPHILS # BLD AUTO: 0.1 K/UL — SIGNIFICANT CHANGE UP (ref 0–0.2)
BASOPHILS NFR BLD AUTO: 2.1 % — HIGH (ref 0–1)
BILIRUB SERPL-MCNC: 0.3 MG/DL — SIGNIFICANT CHANGE UP (ref 0.2–1.2)
BUN SERPL-MCNC: 15 MG/DL — SIGNIFICANT CHANGE UP (ref 10–20)
CALCIUM SERPL-MCNC: 8.7 MG/DL — SIGNIFICANT CHANGE UP (ref 8.4–10.5)
CHLORIDE SERPL-SCNC: 102 MMOL/L — SIGNIFICANT CHANGE UP (ref 98–110)
CO2 SERPL-SCNC: 26 MMOL/L — SIGNIFICANT CHANGE UP (ref 17–32)
CREAT SERPL-MCNC: 0.8 MG/DL — SIGNIFICANT CHANGE UP (ref 0.7–1.5)
EGFR: 86 ML/MIN/1.73M2 — SIGNIFICANT CHANGE UP
EOSINOPHIL # BLD AUTO: 0.15 K/UL — SIGNIFICANT CHANGE UP (ref 0–0.7)
EOSINOPHIL NFR BLD AUTO: 3.1 % — SIGNIFICANT CHANGE UP (ref 0–8)
GLUCOSE SERPL-MCNC: 111 MG/DL — HIGH (ref 70–99)
HCT VFR BLD CALC: 38.3 % — SIGNIFICANT CHANGE UP (ref 37–47)
HGB BLD-MCNC: 11.8 G/DL — LOW (ref 12–16)
IMM GRANULOCYTES NFR BLD AUTO: 0.2 % — SIGNIFICANT CHANGE UP (ref 0.1–0.3)
LYMPHOCYTES # BLD AUTO: 1.47 K/UL — SIGNIFICANT CHANGE UP (ref 1.2–3.4)
LYMPHOCYTES # BLD AUTO: 30.4 % — SIGNIFICANT CHANGE UP (ref 20.5–51.1)
MCHC RBC-ENTMCNC: 23.2 PG — LOW (ref 27–31)
MCHC RBC-ENTMCNC: 30.8 G/DL — LOW (ref 32–37)
MCV RBC AUTO: 75.2 FL — LOW (ref 81–99)
MONOCYTES # BLD AUTO: 0.6 K/UL — SIGNIFICANT CHANGE UP (ref 0.1–0.6)
MONOCYTES NFR BLD AUTO: 12.4 % — HIGH (ref 1.7–9.3)
NEUTROPHILS # BLD AUTO: 2.5 K/UL — SIGNIFICANT CHANGE UP (ref 1.4–6.5)
NEUTROPHILS NFR BLD AUTO: 51.8 % — SIGNIFICANT CHANGE UP (ref 42.2–75.2)
NRBC # BLD: 0 /100 WBCS — SIGNIFICANT CHANGE UP (ref 0–0)
PLATELET # BLD AUTO: 419 K/UL — HIGH (ref 130–400)
PMV BLD: 9.6 FL — SIGNIFICANT CHANGE UP (ref 7.4–10.4)
POTASSIUM SERPL-MCNC: 4.5 MMOL/L — SIGNIFICANT CHANGE UP (ref 3.5–5)
POTASSIUM SERPL-SCNC: 4.5 MMOL/L — SIGNIFICANT CHANGE UP (ref 3.5–5)
PROT SERPL-MCNC: 6.4 G/DL — SIGNIFICANT CHANGE UP (ref 6–8)
RBC # BLD: 5.09 M/UL — SIGNIFICANT CHANGE UP (ref 4.2–5.4)
RBC # FLD: 24.5 % — HIGH (ref 11.5–14.5)
SARS-COV-2 RNA SPEC QL NAA+PROBE: SIGNIFICANT CHANGE UP
SODIUM SERPL-SCNC: 140 MMOL/L — SIGNIFICANT CHANGE UP (ref 135–146)
WBC # BLD: 4.83 K/UL — SIGNIFICANT CHANGE UP (ref 4.8–10.8)
WBC # FLD AUTO: 4.83 K/UL — SIGNIFICANT CHANGE UP (ref 4.8–10.8)

## 2023-10-12 PROCEDURE — 99239 HOSP IP/OBS DSCHRG MGMT >30: CPT

## 2023-10-12 RX ORDER — LANOLIN ALCOHOL/MO/W.PET/CERES
3 CREAM (GRAM) TOPICAL AT BEDTIME
Refills: 0 | Status: DISCONTINUED | OUTPATIENT
Start: 2023-10-12 | End: 2023-10-13

## 2023-10-12 RX ORDER — MIDODRINE HYDROCHLORIDE 2.5 MG/1
1 TABLET ORAL
Qty: 90 | Refills: 0
Start: 2023-10-12 | End: 2023-11-10

## 2023-10-12 RX ORDER — PANTOPRAZOLE SODIUM 20 MG/1
1 TABLET, DELAYED RELEASE ORAL
Qty: 120 | Refills: 0
Start: 2023-10-12 | End: 2023-12-10

## 2023-10-12 RX ORDER — PANTOPRAZOLE SODIUM 20 MG/1
1 TABLET, DELAYED RELEASE ORAL
Qty: 120 | Refills: 0 | DISCHARGE
Start: 2023-10-12 | End: 2023-12-10

## 2023-10-12 RX ADMIN — MIDODRINE HYDROCHLORIDE 10 MILLIGRAM(S): 2.5 TABLET ORAL at 05:42

## 2023-10-12 RX ADMIN — Medication 1 APPLICATION(S): at 20:03

## 2023-10-12 RX ADMIN — Medication 3 MILLIGRAM(S): at 23:40

## 2023-10-12 RX ADMIN — CHLORHEXIDINE GLUCONATE 1 APPLICATION(S): 213 SOLUTION TOPICAL at 05:42

## 2023-10-12 RX ADMIN — MIDODRINE HYDROCHLORIDE 10 MILLIGRAM(S): 2.5 TABLET ORAL at 22:35

## 2023-10-12 RX ADMIN — PANTOPRAZOLE SODIUM 40 MILLIGRAM(S): 20 TABLET, DELAYED RELEASE ORAL at 05:42

## 2023-10-12 RX ADMIN — Medication 1 APPLICATION(S): at 05:43

## 2023-10-12 RX ADMIN — SENNA PLUS 2 TABLET(S): 8.6 TABLET ORAL at 22:35

## 2023-10-12 NOTE — PROGRESS NOTE ADULT - SUBJECTIVE AND OBJECTIVE BOX
SUBJECTIVE:    Patient is a 58y old Female who presents with a chief complaint of Upper GI bleed (12 Oct 2023 10:45)    Currently admitted to medicine with the primary diagnosis of GI bleed       Today is hospital day 13d.     PAST MEDICAL & SURGICAL HISTORY  Down syndrome    Osteoporosis    Mild anemia    Neuropathy    S/P debridement  of R hip on 3/2/21      ALLERGIES:  No Known Allergies    MEDICATIONS:  STANDING MEDICATIONS  bacitracin   Ointment 1 Application(s) Topical two times a day  chlorhexidine 2% Cloths 1 Application(s) Topical <User Schedule>  enoxaparin Injectable 40 milliGRAM(s) SubCutaneous every 24 hours  gabapentin 600 milliGRAM(s) Oral daily  lactated ringers. 1000 milliLiter(s) IV Continuous <Continuous>  midodrine 10 milliGRAM(s) Oral every 8 hours  pantoprazole  Injectable 40 milliGRAM(s) IV Push two times a day  polyethylene glycol 3350 17 Gram(s) Oral two times a day  senna 2 Tablet(s) Oral at bedtime    PRN MEDICATIONS  acetaminophen     Tablet .. 650 milliGRAM(s) Oral every 6 hours PRN    VITALS:   T(F): 96.8  HR: 70  BP: 118/56  RR: 18  SpO2: 98%    LABS:                        11.8   4.83  )-----------( 419      ( 12 Oct 2023 06:32 )             38.3     10-12    140  |  102  |  15  ----------------------------<  111<H>  4.5   |  26  |  0.8    Ca    8.7      12 Oct 2023 06:32  Mg     2.3     10-11    TPro  6.4  /  Alb  3.1<L>  /  TBili  0.3  /  DBili  x   /  AST  32  /  ALT  30  /  AlkPhos  96  10-12      Urinalysis Basic - ( 12 Oct 2023 06:32 )    Color: x / Appearance: x / SG: x / pH: x  Gluc: 111 mg/dL / Ketone: x  / Bili: x / Urobili: x   Blood: x / Protein: x / Nitrite: x   Leuk Esterase: x / RBC: x / WBC x   Sq Epi: x / Non Sq Epi: x / Bacteria: x                RADIOLOGY:    PHYSICAL EXAM:  GEN: No acute distress  LUNGS: Clear to auscultation bilaterally   HEART: S1/S2 present. RRR.   ABD/ GI: Soft, non-tender, non-distended. Bowel sounds present  EXT: NC/NC/NE/2+PP/COHEN  NEURO: AAOX3     SUBJECTIVE:    Patient is a 58y old Female who presents with a chief complaint of Upper GI bleed (12 Oct 2023 10:45)    Currently admitted to medicine with the primary diagnosis of GI bleed       Today is hospital day 13d.     PAST MEDICAL & SURGICAL HISTORY  Down syndrome    Osteoporosis    Mild anemia    Neuropathy    S/P debridement  of R hip on 3/2/21      ALLERGIES:  No Known Allergies    MEDICATIONS:  STANDING MEDICATIONS  bacitracin   Ointment 1 Application(s) Topical two times a day  chlorhexidine 2% Cloths 1 Application(s) Topical <User Schedule>  enoxaparin Injectable 40 milliGRAM(s) SubCutaneous every 24 hours  gabapentin 600 milliGRAM(s) Oral daily  lactated ringers. 1000 milliLiter(s) IV Continuous <Continuous>  midodrine 10 milliGRAM(s) Oral every 8 hours  pantoprazole  Injectable 40 milliGRAM(s) IV Push two times a day  polyethylene glycol 3350 17 Gram(s) Oral two times a day  senna 2 Tablet(s) Oral at bedtime    PRN MEDICATIONS  acetaminophen     Tablet .. 650 milliGRAM(s) Oral every 6 hours PRN    VITALS:   T(F): 96.8  HR: 70  BP: 118/56  RR: 18  SpO2: 98%    LABS:                        11.8   4.83  )-----------( 419      ( 12 Oct 2023 06:32 )             38.3     10-12    140  |  102  |  15  ----------------------------<  111<H>  4.5   |  26  |  0.8    Ca    8.7      12 Oct 2023 06:32  Mg     2.3     10-11    TPro  6.4  /  Alb  3.1<L>  /  TBili  0.3  /  DBili  x   /  AST  32  /  ALT  30  /  AlkPhos  96  10-12      Urinalysis Basic - ( 12 Oct 2023 06:32 )    Color: x / Appearance: x / SG: x / pH: x  Gluc: 111 mg/dL / Ketone: x  / Bili: x / Urobili: x   Blood: x / Protein: x / Nitrite: x   Leuk Esterase: x / RBC: x / WBC x   Sq Epi: x / Non Sq Epi: x / Bacteria: x                RADIOLOGY:    PHYSICAL EXAM:  GEN: No acute distress  LUNGS: Clear to auscultation bilaterally   HEART: S1/S2 present. RRR.   ABD/ GI: Soft, non-tender, non-distended. Bowel sounds present  EXT: NC/NC/NE/2+PP/COHEN  NEURO: AAOX0-- nonverbal

## 2023-10-12 NOTE — PROGRESS NOTE ADULT - ASSESSMENT
58F with PMH of Down syndrome, cerebral palsy, severe intellectual disability, nonverbal at baseline, hypothyroidism, congenital pulmonary stenosis, chronic pressure ulcers, orthostatic hypotension, constipation, presents to the ED for coffee ground emesis and AMS. Patient was recently admitted to the hospital for management of pneumonia and resultant hypoxic respiratory failure. Patient admitted to MICU for presumptive upper GI bleed. Surgery consulted for CT findings of duodenal perforation. No acute intervention was done and patient was downgraded to SDU     Hypovolemic and septic shock - resolved, off levophed  Suspected Acute upper GI bleed s/p 2 units pRBC  Possible duodenal perforation sp surgical evaluation  - CT A/P 9/29 - 1. New foci of extraluminal air along posterior wall of third portion duodenum, most consistent with perforated ulcer. 2. Diffuse circumferential wall thickening of the colon and rectum, nonspecific. Correlate for proctocolitis.3. Bilateral lower lobe and right middle lobe multifocal pneumonia.  - repeat CTAP w/ oral and IV contrast: No contrast extravasation, Previous seen foci of retroperitoneal gas no longer identified.  - s/p 2u PRBC since admission, Hb stable  - off levophed, on midodrine  - Surgery: no plans for any surgical intervention. Pt tolerated feeds  - s/p GI eval: no intervention  -  had 3 bowel movements yesterday    Acute hypoxemic respiratory failure s/p intubation, likely due to multifocal PNA/Possible Aspiration   - extubated 10/4, now on room air, intermittently requires NC   - infectious work up unremarkable  - trach aspirate cx x2- no growth  - completed meropenem for total of 8 days per ID   - continue puree w/ mildly thick liquids diet per s/s    NELA likely prerenal, resolved  - Cr 1.6 --> 1.2-- > 1.1  - barlow removed,    NSTEMI likely type II due to shock   - Echo results 9/30 EF 55 to 60 %, Normal left systolic function Diastolic function could not be assessed.   - Trend trop: 0.34 -> 0.11      Down's syndrome with cerebral palsy-- non verbal  Overall prognosis is poor    DC plan today--spent more than 30mins.

## 2023-10-12 NOTE — DISCHARGE NOTE PROVIDER - PROVIDER TOKENS
PROVIDER:[TOKEN:[[id number]],FOLLOWUP:[2 weeks]],PROVIDER:[TOKEN:[[id number]],FOLLOWUP:[2 weeks]]

## 2023-10-12 NOTE — DISCHARGE NOTE PROVIDER - CARE PROVIDER_API CALL
Lindy Gonzales  Gastroenterology  [address] [place] [address]  Phone: [phone]  Fax: [phone]  Follow Up Time: 2 weeks    Maggie Crow  Internal Medicine  [address] [place] [address]  Phone: [phone]  Fax: [phone]  Follow Up Time: 2 weeks

## 2023-10-12 NOTE — DISCHARGE NOTE PROVIDER - NSDCCPCAREPLAN_GEN_ALL_CORE_FT
PRINCIPAL DISCHARGE DIAGNOSIS  Diagnosis: GI bleed  Assessment and Plan of Treatment: You were evaluated for a possible GI bleedand perforation. You received blood products for possible bleeding. Your hemoglobin, a marker of the blood, remained stable during hospitalation. Please follow up with Gastroentereology outpatient: [facility] located at [address] [place] [address]. Phone Number: [phone]      SECONDARY DISCHARGE DIAGNOSES  Diagnosis: Acute respiratory failure with hypoxia  Assessment and Plan of Treatment: Seek immediate medical attention if you experience chest pain, trouble breathing, blue lips or fingernails, fever of 100.4°F  (38°C) or higher, yellow, green, bloody, or smelly sputum, more than normal mucus production, vomiting or diarrhea.

## 2023-10-12 NOTE — DISCHARGE NOTE PROVIDER - NSDCFUSCHEDAPPT_GEN_ALL_CORE_FT
Behuria, Supreeti  [facility] PreAdmits  Scheduled Appointment: 10/17/2023    [facility] Physician [facility]  CARDIOLOGY  [place]   Scheduled Appointment: 10/17/2023

## 2023-10-12 NOTE — DISCHARGE NOTE PROVIDER - NSDCMRMEDTOKEN_GEN_ALL_CORE_FT
gabapentin 600 mg oral tablet: 1 orally once a day  melatonin 5 mg oral tablet: 1 tab(s) orally once a day (at bedtime)  Multiple Vitamins oral tablet: 1 tab(s) orally once a day  pantoprazole 40 mg oral delayed release tablet: 1 tab(s) orally once a day  raloxifene 60 mg oral tablet: 1 tab(s) orally once a day   gabapentin 600 mg oral tablet: 1 orally once a day  melatonin 5 mg oral tablet: 1 tab(s) orally once a day (at bedtime)  Multiple Vitamins oral tablet: 1 tab(s) orally once a day  Protonix 40 mg oral delayed release tablet: 1 tab(s) orally 2 times a day Protonix 40 mg two times daily for 2 months then once daily thereafter  raloxifene 60 mg oral tablet: 1 tab(s) orally once a day   gabapentin 600 mg oral tablet: 1 orally once a day  melatonin 5 mg oral tablet: 1 tab(s) orally once a day (at bedtime)  midodrine 5 mg oral tablet: 1 tab(s) orally every 8 hours  Multiple Vitamins oral tablet: 1 tab(s) orally once a day  Protonix 40 mg oral delayed release tablet: 1 tab(s) orally 2 times a day Protonix 40 mg two times daily for 2 months then once daily thereafter  raloxifene 60 mg oral tablet: 1 tab(s) orally once a day

## 2023-10-12 NOTE — DISCHARGE NOTE PROVIDER - CARE PROVIDERS DIRECT ADDRESSES
[email or web address]

## 2023-10-12 NOTE — DISCHARGE NOTE PROVIDER - HOSPITAL COURSE
Patient is a 58 year old female with a PMHx of Down syndrome, nonverbal at baseline, hypothyroidism, cerebral palsy, congenital pulmonary stenosis presents to the ED for hemoptysis. Patient was recently admitted to the hospital for management of pneumonia and resultant hypoxic respiratory failure. Patient was noted of having hematemesis when she was admitted previously, a CT of the abdomen showed esophagitis for which GI recommended OP upper endoscopy. On the day of presentation, the patient was less responsive with noted hemoptysis. Per [facility] personnel, patient was not exhibiting any signs of respiratory distress, abdominal pain, cough, shortness of breath, fevers/ sweating, or any changes in bowel habits.    In the ED, the patient was HD stable, afebrile. Labs workup revealed Hb= 9.9, WBC= 11k, plt= 432, creatinine= 1.6. pH= 7.19/ CO2= 55/ O2= 188/ 21. Patient received 2 PRBCs, got intubated for airway protection where blood was noted in the trachea. A CT of the abdomen showed foci of air in the lumen of the stomach, consistent with duodenal perforation. The CT showed as well bibasilar infiltrates, consistent with pneumonia. Patient was started on Protonix drip, and admitted to ICU for further management.    Patient with hypovolemic and septic shock requiring temporary pressor support, which resolved. Patient with acute hypoxemic respiratory failure likely due to multifocal pneumonia. Patient was successfully extubated and completed a course of antibiotics per ID, further infectious workup was unremarkable. Repeat CTAP with contrast showed no contrast extravasation and previous seen foci of retroperitoneal gas no longer identified. The patient was evaluated by surgery and GI, and no acute intervention recommended. Patient with stable hemoglobin. Hospital course complicated by NELA likely prerenal which resolved and NSTEMI likely type II due to shock (Echo 9/30 EF 55 to 60%, Normal left systolic function Diastolic function could not be assessed) with subsequent downtrending trops (0.34 --> 0.11). Patient's hospital course ICU --> Stepdown --> General medical floor. Patient remains hemodynamically stable on general medical, tolerating pureed feeds, and has bowel movements. Patient stable for discharge back to group home.          Patient is a 58 year old female with a PMHx of Down syndrome, nonverbal at baseline, hypothyroidism, cerebral palsy, congenital pulmonary stenosis presents to the ED for hemoptysis. Patient was recently admitted to the hospital for management of pneumonia and resultant hypoxic respiratory failure. Patient was noted of having hematemesis when she was admitted previously, a CT of the abdomen showed esophagitis for which GI recommended OP upper endoscopy. On the day of presentation, the patient was less responsive with noted hemoptysis. Per [facility] personnel, patient was not exhibiting any signs of respiratory distress, abdominal pain, cough, shortness of breath, fevers/ sweating, or any changes in bowel habits.    In the ED, the patient was HD stable, afebrile. Labs workup revealed Hb= 9.9, WBC= 11k, plt= 432, creatinine= 1.6. pH= 7.19/ CO2= 55/ O2= 188/ 21. Patient received 2 PRBCs, got intubated for airway protection where blood was noted in the trachea. A CT of the abdomen showed foci of air in the lumen of the stomach, consistent with duodenal perforation. The CT showed as well bibasilar infiltrates, consistent with pneumonia. Patient was started on Protonix drip, and admitted to ICU for further management.    Patient with hypovolemic and septic shock requiring temporary pressor support, which resolved. Patient with acute hypoxemic respiratory failure likely due to multifocal pneumonia. Patient was successfully extubated and completed a course of antibiotics per ID, further infectious workup was unremarkable. Repeat CTAP with contrast showed no contrast extravasation and previous seen foci of retroperitoneal gas no longer identified. The patient was evaluated by surgery and GI, and no acute intervention recommended. Patient with stable hemoglobin. Hospital course complicated by NELA likely prerenal which resolved and NSTEMI likely type II due to shock (Echo 9/30 EF 55 to 60%, Normal left systolic function Diastolic function could not be assessed) with subsequent downtrending trops (0.34 --> 0.11). Patient's hospital course ICU --> Stepdown --> General medical floor. Patient remains hemodynamically stable on general medical, tolerating pureed feeds, and has bowel movements.     Patient stable for discharge back to group home. Please follow up with GI outpatient. Please continue Protonix PO 40mg BID for 8 weeks, then PO 40mg qd thereafter.

## 2023-10-13 ENCOUNTER — TRANSCRIPTION ENCOUNTER (OUTPATIENT)
Age: 58
End: 2023-10-13

## 2023-10-13 VITALS
SYSTOLIC BLOOD PRESSURE: 118 MMHG | DIASTOLIC BLOOD PRESSURE: 59 MMHG | HEART RATE: 68 BPM | TEMPERATURE: 97 F | RESPIRATION RATE: 18 BRPM

## 2023-10-13 LAB
ALBUMIN SERPL ELPH-MCNC: 3.4 G/DL — LOW (ref 3.5–5.2)
ALP SERPL-CCNC: 98 U/L — SIGNIFICANT CHANGE UP (ref 30–115)
ALT FLD-CCNC: 33 U/L — SIGNIFICANT CHANGE UP (ref 0–41)
ANION GAP SERPL CALC-SCNC: 11 MMOL/L — SIGNIFICANT CHANGE UP (ref 7–14)
AST SERPL-CCNC: 31 U/L — SIGNIFICANT CHANGE UP (ref 0–41)
BASOPHILS # BLD AUTO: 0.1 K/UL — SIGNIFICANT CHANGE UP (ref 0–0.2)
BASOPHILS NFR BLD AUTO: 2.5 % — HIGH (ref 0–1)
BILIRUB SERPL-MCNC: 0.3 MG/DL — SIGNIFICANT CHANGE UP (ref 0.2–1.2)
BUN SERPL-MCNC: 13 MG/DL — SIGNIFICANT CHANGE UP (ref 10–20)
CALCIUM SERPL-MCNC: 8.9 MG/DL — SIGNIFICANT CHANGE UP (ref 8.4–10.5)
CHLORIDE SERPL-SCNC: 103 MMOL/L — SIGNIFICANT CHANGE UP (ref 98–110)
CO2 SERPL-SCNC: 28 MMOL/L — SIGNIFICANT CHANGE UP (ref 17–32)
CREAT SERPL-MCNC: 0.7 MG/DL — SIGNIFICANT CHANGE UP (ref 0.7–1.5)
EGFR: 101 ML/MIN/1.73M2 — SIGNIFICANT CHANGE UP
EOSINOPHIL # BLD AUTO: 0.15 K/UL — SIGNIFICANT CHANGE UP (ref 0–0.7)
EOSINOPHIL NFR BLD AUTO: 3.8 % — SIGNIFICANT CHANGE UP (ref 0–8)
GLUCOSE SERPL-MCNC: 105 MG/DL — HIGH (ref 70–99)
HCT VFR BLD CALC: 38 % — SIGNIFICANT CHANGE UP (ref 37–47)
HGB BLD-MCNC: 11.9 G/DL — LOW (ref 12–16)
IMM GRANULOCYTES NFR BLD AUTO: 0 % — LOW (ref 0.1–0.3)
LYMPHOCYTES # BLD AUTO: 1.69 K/UL — SIGNIFICANT CHANGE UP (ref 1.2–3.4)
LYMPHOCYTES # BLD AUTO: 42.5 % — SIGNIFICANT CHANGE UP (ref 20.5–51.1)
MCHC RBC-ENTMCNC: 23.7 PG — LOW (ref 27–31)
MCHC RBC-ENTMCNC: 31.3 G/DL — LOW (ref 32–37)
MCV RBC AUTO: 75.5 FL — LOW (ref 81–99)
MONOCYTES # BLD AUTO: 0.48 K/UL — SIGNIFICANT CHANGE UP (ref 0.1–0.6)
MONOCYTES NFR BLD AUTO: 12.1 % — HIGH (ref 1.7–9.3)
NEUTROPHILS # BLD AUTO: 1.56 K/UL — SIGNIFICANT CHANGE UP (ref 1.4–6.5)
NEUTROPHILS NFR BLD AUTO: 39.1 % — LOW (ref 42.2–75.2)
NRBC # BLD: 0 /100 WBCS — SIGNIFICANT CHANGE UP (ref 0–0)
PLATELET # BLD AUTO: 441 K/UL — HIGH (ref 130–400)
PMV BLD: 9.7 FL — SIGNIFICANT CHANGE UP (ref 7.4–10.4)
POTASSIUM SERPL-MCNC: 4.6 MMOL/L — SIGNIFICANT CHANGE UP (ref 3.5–5)
POTASSIUM SERPL-SCNC: 4.6 MMOL/L — SIGNIFICANT CHANGE UP (ref 3.5–5)
PROT SERPL-MCNC: 6.6 G/DL — SIGNIFICANT CHANGE UP (ref 6–8)
RBC # BLD: 5.03 M/UL — SIGNIFICANT CHANGE UP (ref 4.2–5.4)
RBC # FLD: 24.7 % — HIGH (ref 11.5–14.5)
SODIUM SERPL-SCNC: 142 MMOL/L — SIGNIFICANT CHANGE UP (ref 135–146)
WBC # BLD: 3.98 K/UL — LOW (ref 4.8–10.8)
WBC # FLD AUTO: 3.98 K/UL — LOW (ref 4.8–10.8)

## 2023-10-13 PROCEDURE — 99239 HOSP IP/OBS DSCHRG MGMT >30: CPT

## 2023-10-13 RX ORDER — PANTOPRAZOLE SODIUM 20 MG/1
40 TABLET, DELAYED RELEASE ORAL
Refills: 0 | Status: DISCONTINUED | OUTPATIENT
Start: 2023-10-13 | End: 2023-10-13

## 2023-10-13 RX ADMIN — CHLORHEXIDINE GLUCONATE 1 APPLICATION(S): 213 SOLUTION TOPICAL at 05:40

## 2023-10-13 RX ADMIN — Medication 1 APPLICATION(S): at 05:42

## 2023-10-13 RX ADMIN — PANTOPRAZOLE SODIUM 40 MILLIGRAM(S): 20 TABLET, DELAYED RELEASE ORAL at 05:41

## 2023-10-13 RX ADMIN — GABAPENTIN 600 MILLIGRAM(S): 400 CAPSULE ORAL at 12:46

## 2023-10-13 RX ADMIN — POLYETHYLENE GLYCOL 3350 17 GRAM(S): 17 POWDER, FOR SOLUTION ORAL at 05:40

## 2023-10-13 RX ADMIN — ENOXAPARIN SODIUM 40 MILLIGRAM(S): 100 INJECTION SUBCUTANEOUS at 12:46

## 2023-10-13 RX ADMIN — MIDODRINE HYDROCHLORIDE 10 MILLIGRAM(S): 2.5 TABLET ORAL at 05:40

## 2023-10-13 NOTE — PROGRESS NOTE ADULT - REASON FOR ADMISSION
Upper GI bleed

## 2023-10-13 NOTE — PROGRESS NOTE ADULT - SUBJECTIVE AND OBJECTIVE BOX
SUBJECTIVE:    Patient is a 58y old Female who presents with a chief complaint of Upper GI bleed (12 Oct 2023 12:36)    Currently admitted to medicine with the primary diagnosis of GI bleed       Today is hospital day 14d.     PAST MEDICAL & SURGICAL HISTORY  Down syndrome    Osteoporosis    Mild anemia    Neuropathy    S/P debridement  of R hip on 3/2/21      ALLERGIES:  No Known Allergies    MEDICATIONS:  STANDING MEDICATIONS  bacitracin   Ointment 1 Application(s) Topical two times a day  chlorhexidine 2% Cloths 1 Application(s) Topical <User Schedule>  enoxaparin Injectable 40 milliGRAM(s) SubCutaneous every 24 hours  gabapentin 600 milliGRAM(s) Oral daily  lactated ringers. 1000 milliLiter(s) IV Continuous <Continuous>  midodrine 10 milliGRAM(s) Oral every 8 hours  pantoprazole    Tablet 40 milliGRAM(s) Oral two times a day  polyethylene glycol 3350 17 Gram(s) Oral two times a day  senna 2 Tablet(s) Oral at bedtime    PRN MEDICATIONS  acetaminophen     Tablet .. 650 milliGRAM(s) Oral every 6 hours PRN  melatonin 3 milliGRAM(s) Oral at bedtime PRN    VITALS:   T(F): 96.8  HR: 68  BP: 118/59  RR: 18  SpO2: 95%    LABS:                        11.9   3.98  )-----------( 441      ( 13 Oct 2023 05:04 )             38.0     10-13    142  |  103  |  13  ----------------------------<  105<H>  4.6   |  28  |  0.7    Ca    8.9      13 Oct 2023 05:04    TPro  6.6  /  Alb  3.4<L>  /  TBili  0.3  /  DBili  x   /  AST  31  /  ALT  33  /  AlkPhos  98  10-13      Urinalysis Basic - ( 13 Oct 2023 05:04 )    Color: x / Appearance: x / SG: x / pH: x  Gluc: 105 mg/dL / Ketone: x  / Bili: x / Urobili: x   Blood: x / Protein: x / Nitrite: x   Leuk Esterase: x / RBC: x / WBC x   Sq Epi: x / Non Sq Epi: x / Bacteria: x                RADIOLOGY:    PHYSICAL EXAM:  GEN: No acute distress  LUNGS: Clear to auscultation bilaterally   HEART: S1/S2 present. RRR.   ABD/ GI: Soft, non-tender, non-distended. Bowel sounds present  EXT: contracted ext  NEURO: AAOX0-- nonverbal

## 2023-10-13 NOTE — DISCHARGE NOTE NURSING/CASE MANAGEMENT/SOCIAL WORK - NSDCPEFALRISK_GEN_ALL_CORE
For information on Fall & Injury Prevention, visit: [email or web address] OR  [email or web address] OR  https://www.cdc.gov/steadi/patient.html

## 2023-10-13 NOTE — DISCHARGE NOTE NURSING/CASE MANAGEMENT/SOCIAL WORK - PATIENT PORTAL LINK FT
You can access the FollowMyHealth Patient Portal offered by [facility] by registering at the following website: [email or web address]. By joining [employer]’s FollowMyHealth portal, you will also be able to view your health information using other applications (apps) compatible with our system.

## 2023-10-13 NOTE — PROGRESS NOTE ADULT - ASSESSMENT
58F with PMH of Down syndrome, cerebral palsy, severe intellectual disability, nonverbal at baseline, hypothyroidism, congenital pulmonary stenosis, chronic pressure ulcers, orthostatic hypotension, constipation, presents to the ED for coffee ground emesis and AMS. Patient was recently admitted to the hospital for management of pneumonia and resultant hypoxic respiratory failure. Patient admitted to MICU for presumptive upper GI bleed. Surgery consulted for CT findings of duodenal perforation. No acute intervention was done and patient was downgraded to SDU     Hypovolemic and septic shock - resolved, off levophed  Suspected Acute upper GI bleed s/p 2 units pRBC  Possible duodenal perforation sp surgical evaluation  - CT A/P 9/29 - 1. New foci of extraluminal air along posterior wall of third portion duodenum, most consistent with perforated ulcer. 2. Diffuse circumferential wall thickening of the colon and rectum, nonspecific. Correlate for proctocolitis.3. Bilateral lower lobe and right middle lobe multifocal pneumonia.  - repeat CTAP w/ oral and IV contrast: No contrast extravasation, Previous seen foci of retroperitoneal gas no longer identified.  - s/p 2u PRBC since admission, Hb stable  - off levophed, on midodrine  - Surgery: no plans for any surgical intervention. Pt tolerated feeds  - s/p GI eval: no intervention  -  had bowel movements    Acute hypoxemic respiratory failure s/p intubation, likely due to multifocal PNA/Possible Aspiration   - extubated 10/4, now on room air, intermittently requires NC   - infectious work up unremarkable  - trach aspirate cx x2- no growth  - completed meropenem for total of 8 days per ID   - continue puree w/ mildly thick liquids diet per s/s    NELA likely prerenal, resolved  - Cr 1.6 --> 1.2-- > 1.1  - barlow removed,    NSTEMI likely type II due to shock   - Echo results 9/30 EF 55 to 60 %, Normal left systolic function Diastolic function could not be assessed.   - Trend trop: 0.34 -> 0.11      Down's syndrome with cerebral palsy-- non verbal  Overall prognosis is poor    DC plan today--spent more than 30mins.

## 2023-10-13 NOTE — PROGRESS NOTE ADULT - PROVIDER SPECIALTY LIST ADULT
Critical Care
Internal Medicine
MICU
Pulmonology
Critical Care
Gastroenterology
Hospitalist
Hospitalist
MICU
Critical Care
Gastroenterology
Infectious Disease
Infectious Disease
Internal Medicine
MICU
Trauma Surgery
Critical Care
Gastroenterology
Hospitalist
Internal Medicine
MICU
MICU
Pulmonology
Surgery
Palliative Care
Surgery
Palliative Care
Infectious Disease
Palliative Care
Palliative Care

## 2023-10-14 ENCOUNTER — INPATIENT (INPATIENT)
Facility: HOSPITAL | Age: 58
LOS: 36 days | Discharge: HOME | DRG: 474 | End: 2023-11-20
Attending: STUDENT IN AN ORGANIZED HEALTH CARE EDUCATION/TRAINING PROGRAM | Admitting: INTERNAL MEDICINE
Payer: COMMERCIAL

## 2023-10-14 VITALS
SYSTOLIC BLOOD PRESSURE: 86 MMHG | TEMPERATURE: 98 F | HEIGHT: 57.09 IN | HEART RATE: 77 BPM | OXYGEN SATURATION: 98 % | WEIGHT: 110.01 LBS | RESPIRATION RATE: 24 BRPM | DIASTOLIC BLOOD PRESSURE: 55 MMHG

## 2023-10-14 DIAGNOSIS — R55 SYNCOPE AND COLLAPSE: ICD-10-CM

## 2023-10-14 DIAGNOSIS — Z98.890 OTHER SPECIFIED POSTPROCEDURAL STATES: Chronic | ICD-10-CM

## 2023-10-14 LAB
ALBUMIN SERPL ELPH-MCNC: 3.8 G/DL — SIGNIFICANT CHANGE UP (ref 3.5–5.2)
ALP SERPL-CCNC: 125 U/L — HIGH (ref 30–115)
ALT FLD-CCNC: 41 U/L — SIGNIFICANT CHANGE UP (ref 0–41)
ANION GAP SERPL CALC-SCNC: 13 MMOL/L — SIGNIFICANT CHANGE UP (ref 7–14)
AST SERPL-CCNC: 37 U/L — SIGNIFICANT CHANGE UP (ref 0–41)
BASE EXCESS BLDV CALC-SCNC: 7.9 MMOL/L — HIGH (ref -2–3)
BASOPHILS # BLD AUTO: 0.07 K/UL — SIGNIFICANT CHANGE UP (ref 0–0.2)
BASOPHILS NFR BLD AUTO: 0.8 % — SIGNIFICANT CHANGE UP (ref 0–1)
BILIRUB SERPL-MCNC: 0.2 MG/DL — SIGNIFICANT CHANGE UP (ref 0.2–1.2)
BUN SERPL-MCNC: 17 MG/DL — SIGNIFICANT CHANGE UP (ref 10–20)
CA-I SERPL-SCNC: 1.14 MMOL/L — LOW (ref 1.15–1.33)
CALCIUM SERPL-MCNC: 9.1 MG/DL — SIGNIFICANT CHANGE UP (ref 8.4–10.5)
CHLORIDE SERPL-SCNC: 101 MMOL/L — SIGNIFICANT CHANGE UP (ref 98–110)
CO2 SERPL-SCNC: 28 MMOL/L — SIGNIFICANT CHANGE UP (ref 17–32)
CREAT SERPL-MCNC: 1.1 MG/DL — SIGNIFICANT CHANGE UP (ref 0.7–1.5)
EGFR: 59 ML/MIN/1.73M2 — LOW
EOSINOPHIL # BLD AUTO: 0.05 K/UL — SIGNIFICANT CHANGE UP (ref 0–0.7)
EOSINOPHIL NFR BLD AUTO: 0.6 % — SIGNIFICANT CHANGE UP (ref 0–8)
GAS PNL BLDV: 138 MMOL/L — SIGNIFICANT CHANGE UP (ref 136–145)
GAS PNL BLDV: SIGNIFICANT CHANGE UP
GAS PNL BLDV: SIGNIFICANT CHANGE UP
GLUCOSE SERPL-MCNC: 126 MG/DL — HIGH (ref 70–99)
HCO3 BLDV-SCNC: 33 MMOL/L — HIGH (ref 22–29)
HCT VFR BLD CALC: 41.4 % — SIGNIFICANT CHANGE UP (ref 37–47)
HCT VFR BLDA CALC: 36 % — LOW (ref 39–51)
HGB BLD CALC-MCNC: 11.9 G/DL — LOW (ref 12.6–17.4)
HGB BLD-MCNC: 12.7 G/DL — SIGNIFICANT CHANGE UP (ref 12–16)
IMM GRANULOCYTES NFR BLD AUTO: 0.2 % — SIGNIFICANT CHANGE UP (ref 0.1–0.3)
LACTATE BLDV-MCNC: 1.9 MMOL/L — SIGNIFICANT CHANGE UP (ref 0.5–2)
LYMPHOCYTES # BLD AUTO: 1.19 K/UL — LOW (ref 1.2–3.4)
LYMPHOCYTES # BLD AUTO: 14.3 % — LOW (ref 20.5–51.1)
MAGNESIUM SERPL-MCNC: 2.3 MG/DL — SIGNIFICANT CHANGE UP (ref 1.8–2.4)
MCHC RBC-ENTMCNC: 23.3 PG — LOW (ref 27–31)
MCHC RBC-ENTMCNC: 30.7 G/DL — LOW (ref 32–37)
MCV RBC AUTO: 76.1 FL — LOW (ref 81–99)
MONOCYTES # BLD AUTO: 0.46 K/UL — SIGNIFICANT CHANGE UP (ref 0.1–0.6)
MONOCYTES NFR BLD AUTO: 5.5 % — SIGNIFICANT CHANGE UP (ref 1.7–9.3)
NEUTROPHILS # BLD AUTO: 6.54 K/UL — HIGH (ref 1.4–6.5)
NEUTROPHILS NFR BLD AUTO: 78.6 % — HIGH (ref 42.2–75.2)
NRBC # BLD: 0 /100 WBCS — SIGNIFICANT CHANGE UP (ref 0–0)
NT-PROBNP SERPL-SCNC: 722 PG/ML — HIGH (ref 0–300)
PCO2 BLDV: 49 MMHG — HIGH (ref 39–42)
PH BLDV: 7.44 — HIGH (ref 7.32–7.43)
PLATELET # BLD AUTO: 425 K/UL — HIGH (ref 130–400)
PMV BLD: 9.2 FL — SIGNIFICANT CHANGE UP (ref 7.4–10.4)
PO2 BLDV: 26 MMHG — SIGNIFICANT CHANGE UP
POTASSIUM BLDV-SCNC: 4 MMOL/L — SIGNIFICANT CHANGE UP (ref 3.5–5.1)
POTASSIUM SERPL-MCNC: 4.5 MMOL/L — SIGNIFICANT CHANGE UP (ref 3.5–5)
POTASSIUM SERPL-SCNC: 4.5 MMOL/L — SIGNIFICANT CHANGE UP (ref 3.5–5)
PROT SERPL-MCNC: 7.7 G/DL — SIGNIFICANT CHANGE UP (ref 6–8)
RBC # BLD: 5.44 M/UL — HIGH (ref 4.2–5.4)
RBC # FLD: 25.2 % — HIGH (ref 11.5–14.5)
SAO2 % BLDV: 31 % — SIGNIFICANT CHANGE UP
SODIUM SERPL-SCNC: 142 MMOL/L — SIGNIFICANT CHANGE UP (ref 135–146)
TROPONIN T SERPL-MCNC: 0.02 NG/ML — HIGH
WBC # BLD: 8.33 K/UL — SIGNIFICANT CHANGE UP (ref 4.8–10.8)
WBC # FLD AUTO: 8.33 K/UL — SIGNIFICANT CHANGE UP (ref 4.8–10.8)

## 2023-10-14 PROCEDURE — 85027 COMPLETE CBC AUTOMATED: CPT

## 2023-10-14 PROCEDURE — 94003 VENT MGMT INPAT SUBQ DAY: CPT

## 2023-10-14 PROCEDURE — 88311 DECALCIFY TISSUE: CPT

## 2023-10-14 PROCEDURE — 85018 HEMOGLOBIN: CPT

## 2023-10-14 PROCEDURE — 86850 RBC ANTIBODY SCREEN: CPT

## 2023-10-14 PROCEDURE — 83605 ASSAY OF LACTIC ACID: CPT

## 2023-10-14 PROCEDURE — 99221 1ST HOSP IP/OBS SF/LOW 40: CPT | Mod: GC

## 2023-10-14 PROCEDURE — 87086 URINE CULTURE/COLONY COUNT: CPT

## 2023-10-14 PROCEDURE — 82436 ASSAY OF URINE CHLORIDE: CPT

## 2023-10-14 PROCEDURE — 92612 ENDOSCOPY SWALLOW (FEES) VID: CPT | Mod: GN

## 2023-10-14 PROCEDURE — 92610 EVALUATE SWALLOWING FUNCTION: CPT | Mod: GN

## 2023-10-14 PROCEDURE — 84132 ASSAY OF SERUM POTASSIUM: CPT

## 2023-10-14 PROCEDURE — 87640 STAPH A DNA AMP PROBE: CPT

## 2023-10-14 PROCEDURE — 87116 MYCOBACTERIA CULTURE: CPT

## 2023-10-14 PROCEDURE — 83735 ASSAY OF MAGNESIUM: CPT

## 2023-10-14 PROCEDURE — 84100 ASSAY OF PHOSPHORUS: CPT

## 2023-10-14 PROCEDURE — 80177 DRUG SCRN QUAN LEVETIRACETAM: CPT

## 2023-10-14 PROCEDURE — 93010 ELECTROCARDIOGRAM REPORT: CPT | Mod: 77

## 2023-10-14 PROCEDURE — 84484 ASSAY OF TROPONIN QUANT: CPT

## 2023-10-14 PROCEDURE — 82962 GLUCOSE BLOOD TEST: CPT

## 2023-10-14 PROCEDURE — 84145 PROCALCITONIN (PCT): CPT

## 2023-10-14 PROCEDURE — 71275 CT ANGIOGRAPHY CHEST: CPT

## 2023-10-14 PROCEDURE — 36415 COLL VENOUS BLD VENIPUNCTURE: CPT

## 2023-10-14 PROCEDURE — 87186 SC STD MICRODIL/AGAR DIL: CPT

## 2023-10-14 PROCEDURE — 85730 THROMBOPLASTIN TIME PARTIAL: CPT

## 2023-10-14 PROCEDURE — 86901 BLOOD TYPING SEROLOGIC RH(D): CPT

## 2023-10-14 PROCEDURE — 87206 SMEAR FLUORESCENT/ACID STAI: CPT

## 2023-10-14 PROCEDURE — 80053 COMPREHEN METABOLIC PANEL: CPT

## 2023-10-14 PROCEDURE — 95819 EEG AWAKE AND ASLEEP: CPT

## 2023-10-14 PROCEDURE — 85379 FIBRIN DEGRADATION QUANT: CPT

## 2023-10-14 PROCEDURE — 97162 PT EVAL MOD COMPLEX 30 MIN: CPT | Mod: GP

## 2023-10-14 PROCEDURE — 85014 HEMATOCRIT: CPT

## 2023-10-14 PROCEDURE — 94760 N-INVAS EAR/PLS OXIMETRY 1: CPT

## 2023-10-14 PROCEDURE — 87070 CULTURE OTHR SPECIMN AEROBIC: CPT

## 2023-10-14 PROCEDURE — 99285 EMERGENCY DEPT VISIT HI MDM: CPT

## 2023-10-14 PROCEDURE — 95714 VEEG EA 12-26 HR UNMNTR: CPT

## 2023-10-14 PROCEDURE — 93010 ELECTROCARDIOGRAM REPORT: CPT

## 2023-10-14 PROCEDURE — 87449 NOS EACH ORGANISM AG IA: CPT

## 2023-10-14 PROCEDURE — 99254 IP/OBS CNSLTJ NEW/EST MOD 60: CPT | Mod: GC

## 2023-10-14 PROCEDURE — 87641 MR-STAPH DNA AMP PROBE: CPT

## 2023-10-14 PROCEDURE — 95711 VEEG 2-12 HR UNMONITORED: CPT

## 2023-10-14 PROCEDURE — 82550 ASSAY OF CK (CPK): CPT

## 2023-10-14 PROCEDURE — 85610 PROTHROMBIN TIME: CPT

## 2023-10-14 PROCEDURE — 86900 BLOOD TYPING SEROLOGIC ABO: CPT

## 2023-10-14 PROCEDURE — 84295 ASSAY OF SERUM SODIUM: CPT

## 2023-10-14 PROCEDURE — 88304 TISSUE EXAM BY PATHOLOGIST: CPT

## 2023-10-14 PROCEDURE — 93970 EXTREMITY STUDY: CPT

## 2023-10-14 PROCEDURE — 87040 BLOOD CULTURE FOR BACTERIA: CPT

## 2023-10-14 PROCEDURE — 93005 ELECTROCARDIOGRAM TRACING: CPT

## 2023-10-14 PROCEDURE — 86140 C-REACTIVE PROTEIN: CPT

## 2023-10-14 PROCEDURE — 85025 COMPLETE CBC W/AUTO DIFF WBC: CPT

## 2023-10-14 PROCEDURE — 73630 X-RAY EXAM OF FOOT: CPT | Mod: RT

## 2023-10-14 PROCEDURE — 82330 ASSAY OF CALCIUM: CPT

## 2023-10-14 PROCEDURE — 85652 RBC SED RATE AUTOMATED: CPT

## 2023-10-14 PROCEDURE — 71045 X-RAY EXAM CHEST 1 VIEW: CPT | Mod: 26

## 2023-10-14 PROCEDURE — 84443 ASSAY THYROID STIM HORMONE: CPT

## 2023-10-14 PROCEDURE — 94002 VENT MGMT INPAT INIT DAY: CPT

## 2023-10-14 PROCEDURE — 73718 MRI LOWER EXTREMITY W/O DYE: CPT | Mod: RT

## 2023-10-14 PROCEDURE — 80048 BASIC METABOLIC PNL TOTAL CA: CPT

## 2023-10-14 PROCEDURE — 95700 EEG CONT REC W/VID EEG TECH: CPT

## 2023-10-14 PROCEDURE — 80202 ASSAY OF VANCOMYCIN: CPT

## 2023-10-14 PROCEDURE — 70450 CT HEAD/BRAIN W/O DYE: CPT

## 2023-10-14 PROCEDURE — 81003 URINALYSIS AUTO W/O SCOPE: CPT

## 2023-10-14 PROCEDURE — 87077 CULTURE AEROBIC IDENTIFY: CPT

## 2023-10-14 PROCEDURE — 92526 ORAL FUNCTION THERAPY: CPT | Mod: GN

## 2023-10-14 PROCEDURE — 71045 X-RAY EXAM CHEST 1 VIEW: CPT

## 2023-10-14 PROCEDURE — 82803 BLOOD GASES ANY COMBINATION: CPT

## 2023-10-14 RX ORDER — VANCOMYCIN HCL 1 G
750 VIAL (EA) INTRAVENOUS EVERY 24 HOURS
Refills: 0 | Status: DISCONTINUED | OUTPATIENT
Start: 2023-10-14 | End: 2023-10-16

## 2023-10-14 RX ORDER — LANOLIN ALCOHOL/MO/W.PET/CERES
5 CREAM (GRAM) TOPICAL AT BEDTIME
Refills: 0 | Status: DISCONTINUED | OUTPATIENT
Start: 2023-10-14 | End: 2023-10-18

## 2023-10-14 RX ORDER — CEFEPIME 1 G/1
2000 INJECTION, POWDER, FOR SOLUTION INTRAMUSCULAR; INTRAVENOUS EVERY 12 HOURS
Refills: 0 | Status: DISCONTINUED | OUTPATIENT
Start: 2023-10-14 | End: 2023-10-16

## 2023-10-14 RX ORDER — MIDODRINE HYDROCHLORIDE 2.5 MG/1
5 TABLET ORAL EVERY 8 HOURS
Refills: 0 | Status: DISCONTINUED | OUTPATIENT
Start: 2023-10-14 | End: 2023-10-19

## 2023-10-14 RX ORDER — HEPARIN SODIUM 5000 [USP'U]/ML
5000 INJECTION INTRAVENOUS; SUBCUTANEOUS EVERY 12 HOURS
Refills: 0 | Status: DISCONTINUED | OUTPATIENT
Start: 2023-10-14 | End: 2023-10-17

## 2023-10-14 RX ORDER — RALOXIFENE HYDROCHLORIDE 60 MG/1
60 TABLET, COATED ORAL DAILY
Refills: 0 | Status: DISCONTINUED | OUTPATIENT
Start: 2023-10-14 | End: 2023-11-06

## 2023-10-14 RX ORDER — PANTOPRAZOLE SODIUM 20 MG/1
40 TABLET, DELAYED RELEASE ORAL
Refills: 0 | Status: DISCONTINUED | OUTPATIENT
Start: 2023-10-14 | End: 2023-10-18

## 2023-10-14 RX ORDER — SODIUM CHLORIDE 9 MG/ML
1000 INJECTION INTRAMUSCULAR; INTRAVENOUS; SUBCUTANEOUS ONCE
Refills: 0 | Status: COMPLETED | OUTPATIENT
Start: 2023-10-14 | End: 2023-10-14

## 2023-10-14 RX ORDER — GABAPENTIN 400 MG/1
600 CAPSULE ORAL DAILY
Refills: 0 | Status: DISCONTINUED | OUTPATIENT
Start: 2023-10-14 | End: 2023-11-10

## 2023-10-14 RX ADMIN — SODIUM CHLORIDE 1000 MILLILITER(S): 9 INJECTION INTRAMUSCULAR; INTRAVENOUS; SUBCUTANEOUS at 18:17

## 2023-10-14 RX ADMIN — MIDODRINE HYDROCHLORIDE 5 MILLIGRAM(S): 2.5 TABLET ORAL at 23:11

## 2023-10-14 RX ADMIN — Medication 250 MILLIGRAM(S): at 23:11

## 2023-10-14 RX ADMIN — Medication 5 MILLIGRAM(S): at 23:11

## 2023-10-14 NOTE — H&P ADULT - NSHPLABSRESULTS_GEN_ALL_CORE
LABS:                          12.7   8.33  )-----------( 425      ( 14 Oct 2023 17:18 )             41.4     10-14    142  |  101  |  17  ----------------------------<  126<H>  4.5   |  28  |  1.1    Ca    9.1      14 Oct 2023 17:18  Mg     2.3     10-14    TPro  7.7  /  Alb  3.8  /  TBili  0.2  /  DBili  x   /  AST  37  /  ALT  41  /  AlkPhos  125<H>  10-14    LIVER FUNCTIONS - ( 14 Oct 2023 17:18 )  Alb: 3.8 g/dL / Pro: 7.7 g/dL / ALK PHOS: 125 U/L / ALT: 41 U/L / AST: 37 U/L / GGT: x             Urinalysis Basic - ( 14 Oct 2023 17:18 )    Color: x / Appearance: x / SG: x / pH: x  Gluc: 126 mg/dL / Ketone: x  / Bili: x / Urobili: x   Blood: x / Protein: x / Nitrite: x   Leuk Esterase: x / RBC: x / WBC x   Sq Epi: x / Non Sq Epi: x / Bacteria: x

## 2023-10-14 NOTE — ED PROVIDER NOTE - OBJECTIVE STATEMENT
57 yo female with a pmh of CP, down's syndrome, seizures presents from nursing facility for syncope. pt nonverbal at baseline. no fevers, chill, cough, vomiting, diarrhea, difficulty breathing. 57 yo female with a pmh of CP, down's syndrome, seizures presents from nursing facility for syncope. pt nonverbal at baseline. spoke with facility who states pt became hypoxic and bradycardic for about 10-15 minutes. no fevers, chill, cough, vomiting, diarrhea, difficulty breathing.

## 2023-10-14 NOTE — H&P ADULT - HISTORY OF PRESENT ILLNESS
Patient is a 58 year old female with a PMHx of Down syndrome, nonverbal at baseline, hypothyroidism, cerebral palsy, congenital pulmonary stenosis, Seizures, presents to the ED from nursing facility for syncope associated with tonic-clonic movement witnessed by aide. Initial vitals at nursing home showed bradycardia and hypoxia for about 10-15 minutes. No fevers, chill, cough, vomiting, diarrhea, difficulty breathing.     Hospital course 9/29/2023 -> 10/13/2023 Patient was admitted  for hemoptysis and duodenal perforation requiring intubation and ICU admission for hypovolemic and septic shock requiring temporary pressor support, which resolved. Patient also had acute hypoxemic respiratory failure likely due to multifocal pneumonia. Patient was successfully extubated and completed a course of antibiotics per ID, further infectious workup was unremarkable. Repeat CTAP with contrast showed no contrast extravasation and previous seen foci of retroperitoneal gas no longer identified. The patient was evaluated by surgery and GI, and no acute intervention recommended. Patient with stable hemoglobin. Hospital course complicated by NELA likely prerenal which resolved and NSTEMI likely type II due to shock (Echo 9/30 EF 55 to 60%, Normal left systolic function Diastolic function could not be assessed) with subsequent downtrending trops (0.34 --> 0.11). Patient's hospital course ICU --> Stepdown --> General medical floor. Patient remains hemodynamically stable on general medical, tolerating pureed feeds, and has bowel movements. Patient was discharged on 10/13/2023       In ED:  - Vital signs: BP: 86/55, HR: 77, RR: 24, SpO2: 98% on 4L NC  - Labs showed troponin: 0,02 ( was 0.34 and then 0,11 2 weeks ago), Creatinine 1.1 ( 0.7 on 10/13)

## 2023-10-14 NOTE — H&P ADULT - NSHPPHYSICALEXAM_GEN_ALL_CORE
Gen: NAD  Head: NCAT  HEENT: PERRL, oral mucosa moist, normal conjunctiva, oropharynx clear without exudate or erythema  Lung: CTAB, no respiratory distress, no wheezing, rales, rhonchi  CV: normal s1/s2, rrr, Normal perfusion, pulses 2+ throughout  Abd: soft, NTND, no CVA tenderness  Genitourinary: no pelvic tenderness  MSK: No edema, no visible deformities  Neuro: Cerebral palsy   Skin: No rash Gen: NAD  Head: NCAT  HEENT: PERRL, oral mucosa moist, normal conjunctiva, oropharynx clear without exudate or erythema  Lung: CTAB, no respiratory distress, no wheezing, rales, rhonchi  CV: normal s1/s2, rrr, Normal perfusion, pulses 2+ throughout  Abd: soft, NTND, no CVA tenderness  Genitourinary: no pelvic tenderness  MSK: No edema, no visible deformities  Neuro: Cerebral palsy   Skin: No rash, right foot infected decubitus ulcer, Buttock stage II decubitus ulcer

## 2023-10-14 NOTE — H&P ADULT - ASSESSMENT
Patient is a   58 year old female with a PMHx of Down syndrome, nonverbal at baseline, hypothyroidism, cerebral palsy, congenital pulmonary stenosis, Seizures, presents to the ED from nursing facility for pre-syncope associated with tonic-clonic movement witnessed by aide. Initial vitals at nursing home showed bradycardia and hypoxia for about 10-15 minutes. No fevers, chill, cough, vomiting, diarrhea, difficulty breathing.      In ED:  - Vital signs: BP: 86/55, HR: 77, RR: 24, SpO2: 98% on 4L NC  - Labs showed troponin: 0,02 ( was 0.34 and then 0,11 2 weeks ago), Creatinine 1.1 ( 0.7 on 10/13)     # Pre-syncope   - Ddx (neuro mediated vs orthostatic vs cardiovascular)  - Vital signs: BP: 86/55, HR: 77, RR: 24, SpO2: 98% on 4L NC  - Pre-syncope associated with tonic-clonic movement witnessed by aide   - Initial vitals at nursing home showed bradycardia and hypoxia for about 10-15 minutes.  - Admit tele   - Check monitor for arrhythmic events  - Check orthostatic vs  - Check eeg, tte  - EPS eval on discharge for [facility] placement given episode of bradycardia at nursing home    Patient is a   58 year old female with a PMHx of Down syndrome, nonverbal at baseline, hypothyroidism, cerebral palsy, congenital pulmonary stenosis, Seizures, presents to the ED from nursing facility for pre-syncope associated with tonic-clonic movement witnessed by aide. Initial vitals at nursing home showed bradycardia and hypoxia for about 10-15 minutes. No fevers, chill, cough, vomiting, diarrhea, difficulty breathing.      In ED:  - Vital signs: BP: 86/55, HR: 77, RR: 24, SpO2: 98% on 4L NC  - Labs showed troponin: 0,02 ( was 0.34 and then 0,11 2 weeks ago), Creatinine 1.1 ( 0.7 on 10/13)     # Pre-syncope   - Ddx (neuro mediated vs orthostatic vs cardiovascular)  - Vital signs: BP: 86/55, HR: 77, RR: 24, SpO2: 98% on 4L NC  - Pre-syncope associated with tonic-clonic movement witnessed by aide   - Initial vitals at nursing home showed bradycardia and hypoxia for about 10-15 minutes.  - Admit tele   - Check monitor for arrhythmic events  - Check orthostatic vs  - Check CT head, eeg, tte  - EPS eval on discharge for [facility] placement given episode of bradycardia at nursing home    Patient is a 58 year old female with a PMHx of Down syndrome, nonverbal at baseline, hypothyroidism, cerebral palsy, congenital pulmonary stenosis, Seizures, presents to the ED from nursing facility for syncope associated with tonic-clonic movement witnessed by aide. Initial vitals at nursing home showed bradycardia and hypoxia for about 10-15 minutes. No fevers, chill, cough, vomiting, diarrhea, difficulty breathing.      In ED:  - Vital signs: BP: 86/55, HR: 77, RR: 24, SpO2: 98% on 4L NC  - Labs showed troponin: 0,02 ( was 0.34 and then 0,11 2 weeks ago), Creatinine 1.1 ( 0.7 on 10/13)     # Syncope   - Ddx (neuro mediated vs orthostatic vs cardiovascular)  - Vital signs: BP: 86/55 ( improved to 107/59 after IV fluids), HR: 77, RR: 24, SpO2: 98% on 4L NC ( later 95% on RA )   - Pre-syncope associated with tonic-clonic movement witnessed by aide   - Initial vitals at nursing home showed bradycardia and hypoxia for about 10-15 minutes.  - Admit tele   - Check monitor for arrhythmic events  - Check orthostatic vs  - Check CT head, eeg  - Consider EPS eval on discharge for [facility] placement given episode of bradycardia at nursing home     # Hx of Acute Hypoxemic RF s/p Intubation likely 2/2 Multifocal PNA on previous admission 9/30/2023   - S/p intubation -> extubated 10/4 - no growth in trach aspirate cultures x2 - completed 8 day course of meropenem   - X-ray today pending read   - Hypotension and episode of hypoxia and seizure will start Atbx for pneumonia   - Check MRSA and procalcitonin       - c/w puree diet with thin liquids     Patient is a 58 year old female with a PMHx of Down syndrome, nonverbal at baseline, hypothyroidism, cerebral palsy, congenital pulmonary stenosis, Seizures, presents to the ED from nursing facility for syncope associated with tonic-clonic movement witnessed by aide. Initial vitals at nursing home showed bradycardia and hypoxia for about 10-15 minutes. No fevers, chill, cough, vomiting, diarrhea, difficulty breathing.      # Syncope   # Hx of seizure   - Ddx (neuro mediated vs orthostatic vs cardiovascular)  - Vital signs: BP: 86/55 ( improved to 107/59 after IV fluids), HR: 77, RR: 24, SpO2: 98% on 4L NC ( later 95% on RA )   - Pre-syncope associated with tonic-clonic movement witnessed by aide   - Initial vitals at nursing home showed bradycardia and hypoxia for about 10-15 minutes.  - Admit tele   - Check monitor for arrhythmic events  - Check orthostatic vs  - Check CT head, eeg  - Consider EPS eval on discharge for [facility] placement given episode of bradycardia at nursing home   - Neuro consult     # Hx of Acute Hypoxemic RF s/p Intubation likely 2/2 Multifocal PNA on previous admission 9/30/2023   - S/p intubation -> extubated 10/4 - no growth in trach aspirate cultures x2 - completed 8 day course of meropenem   - X-ray today pending read   - Hypotension and episode of hypoxia and seizure will start Atbx for pneumonia   - Check MRSA and procalcitonin     # Hx of NSTEMI type II due to shock   - Echo results 9/30 EF 55 to 60 %, Normal left systolic function Diastolic function could not be assessed.   - Trend trop: 0.34 -> 0.11 -> 0.02 (Today)      # Down syndrome   # Cerebral palsy  #Congential P stenosis  - Nonverbal at baseline   - Echo results 9/30 EF 55 to 60 %, Normal left systolic function Diastolic function could not be assessed.     #Osteoporosis  - C/w Raloxifene     #Peripheral Neuropathy  - Gabapentin 600mg daily     #Hypothyroidism  - TSH - 1.63 (8/2023 )  - Check TSH     #Misc  - DVT ppx: Lovenox   - GI PPX: Protonix 40mg BID   - Diet: puree diet with thin liquids  - Activity/PT eval  - Code status: Full CODE   - Dispo: MED              Patient is a 58 year old female with a PMHx of Down syndrome, nonverbal at baseline, hypothyroidism, cerebral palsy, congenital pulmonary stenosis, Seizures, presents to the ED from nursing facility for syncope associated with tonic-clonic movement witnessed by aide. Initial vitals at nursing home showed bradycardia and hypoxia for about 10-15 minutes. No fevers, chill, cough, vomiting, diarrhea, difficulty breathing.      # Syncope   # Hx of seizure   - Ddx (neuro mediated vs orthostatic vs cardiovascular)  - Vital signs: BP: 86/55 ( improved to 107/59 after IV fluids), HR: 77, RR: 24, SpO2: 98% on 4L NC ( later 95% on RA )   - Pre-syncope associated with tonic-clonic movement witnessed by aide   - Initial vitals at nursing home showed bradycardia and hypoxia for about 10-15 minutes.  - Admit tele   - Check monitor for arrhythmic events  - Check orthostatic vs  - Check CT head, eeg  - Consider EPS eval on discharge for [facility] placement given episode of bradycardia at nursing home   - Neuro consult   - Seizure precaution     # Hx of Acute Hypoxemic RF s/p Intubation likely 2/2 Multifocal PNA on previous admission 9/30/2023   # right foot infected decubitus ulcer with suspected OM  # Hypotension   - S/p intubation -> extubated 10/4 - no growth in trach aspirate cultures x2 - completed 8 day course of meropenem   - BP: 86/55  - Can be caused by right foot infected decubitus ulcer with suspected OM vs. PNA   - No Sepsis or SIRS on admission   - CXR: b/l patchy opacity to my read which is unchanged from previous (wet read) --> pending official report.   - will cover with Vanc and Cefepime for now.   - check BCx, UA, UCx and procalcitonin and MRSA swab  - check ESR/CRP   - check X-ray of right foot   - on midodrine   - Podiatry consult   - ID consult.     # Buttock stage II decubitus ulcer   - wound care consult     # Hx of NSTEMI type II due to shock   - Echo results 9/30 EF 55 to 60 %, Normal left systolic function Diastolic function could not be assessed.   - Trend trop: 0.34 -> 0.11 -> 0.02 (Today)      # Down syndrome   # Cerebral palsy  #Congential P stenosis  - Nonverbal at baseline   - Echo results 9/30 EF 55 to 60 %, Normal left systolic function Diastolic function could not be assessed.     #Osteoporosis  - C/w Raloxifene     #Peripheral Neuropathy  - Gabapentin 600mg daily     #Hypothyroidism  - TSH - 1.63 (8/2023 )  - Check TSH     #Misc  - DVT ppx: Heparin  SQ   - GI PPX: Protonix 40mg BID   - Diet: puree diet with thin liquids  - Activity/PT eval  - Code status: Full CODE   - Dispo: MED

## 2023-10-14 NOTE — ED ADULT TRIAGE NOTE - CCCP TRG CHIEF CMPLNT
Chief Concern  Total Skin Exam    History of Present Illness  New Problem: lesion  Location: left pretibial  Symptoms: hyperkeratotic lesion  Duration: months  Treatment: not medically treated    Past Medical History  Reviewed in EPIC    Non-melanoma skin cancer: Basal cell carcinoma- left pretibial, 9-2020. Squamous cell carcinoma- left superior medial back, 9-2020  Melanoma: No    Social History  Occupation: Retired    Family History  Melanoma: No    Review of Systems  Negative of systems negative for any cardiac or hematological abnormality, which prevents a biopsy or procedure.    Physical Exam  Areas examined today include the right and left lower extremities, buttocks, abdomen, chest, back, right and left upper extremities, face, neck, and scalp. Appears to be alert and orientated x 3, no abnormalities detected, pleasant affect, and well-groomed. Oral and ocular mucosa are clear.   1. Well healed scar(s) from previously treated skin cancer(s). No sign of recurrence.  2. Large benign cobbled brown lesions, up to 2 cm in size, erythematous papules and benign appearing nevi scattered across the body.  3. Left pretibial- 1.2 cm, hyperkeratotic lesion, adjacent to an old scar.  4. Erythematous scaly papule on the left hand x 1, right hand x 2, right ear x 1.      Assessment/Plan  1. History of Basal cell carcinoma and Squamous cell carcinoma skin cancer.   2. Benign seborrheic keratoses, hemangiomas and nevi. Reassurance.  3. Shave biopsy obtained from the left pretibial- Basal cell carcinoma vs Squamous cell carcinoma.  0.25% Bupivacaine with epinephrine was injected into the area.  Sent to [facility] for pathology.  Wound care instructions explained and provided to patient.  Photograph of location in chart.  4. Liquid nitrogen was applied to a total of 4 actinic keratoses for 10-12 seconds to the lesion(s) and the expected blistering or scabbing reaction explained.  Return if lesion(s) fail to fully  resolve.     Sun block, sun protection, sun avoidance all discussed with patient.  Encouraged once a month self skin exam.   Notify us of any changes or concerns.    Follow-up   Follow-up in 6 months for a complete skin exam.    On 2/21/2023, ICadence MA scribed the services personally performed by Dr Scott.    The documentation recorded by the scribe accurately and completely reflects the service(s) I personally performed and the decisions made by me.          syncope

## 2023-10-14 NOTE — CONSULT NOTE ADULT - SUBJECTIVE AND OBJECTIVE BOX
Neurology Consult    Patient is a 58y old  F with PMHx of PMHx of possible seizures? (not on any AED), Down syndrome, nonverbal at baseline, hypothyroidism, cerebral palsy, congenital pulmonary stenosis who presents from nursing facility for syncope associated with shaking of upper trunk and UE which was witnessed by aide. Initial vitals at nursing home showed bradycardia and hypoxia for about 10-15 minutes. Aide denies urinary/bowel incontinence, tongue bitting. As per aide at the bedside, shaking episode lasted for few min and then she passed out. Initial vitals at nursing home showed bradycardia and hypoxia for about 10-15 minutes. In the ED, she was hypotension and there was concern about sepsis. She was started on broad spec Abx. Neurology was consulted for seizures. Upon chart review, seizure hx is only mentioned in one of paperwork from NH, no seizure hx during last admission or in the o/p notes.    PAST MEDICAL & SURGICAL HISTORY:  Down syndrome      Osteoporosis      Mild anemia      Neuropathy      S/P debridement  of R hip on 3/2/21          FAMILY HISTORY:      Social History: (-) x 3    Allergies    No Known Allergies    Intolerances        MEDICATIONS  (STANDING):  cefepime   IVPB 2000 milliGRAM(s) IV Intermittent every 12 hours  gabapentin 600 milliGRAM(s) Oral daily  heparin   Injectable 5000 Unit(s) SubCutaneous every 12 hours  melatonin 5 milliGRAM(s) Oral at bedtime  midodrine 5 milliGRAM(s) Oral every 8 hours  multivitamin 1 Tablet(s) Oral daily  pantoprazole    Tablet 40 milliGRAM(s) Oral two times a day  raloxifene 60 milliGRAM(s) Oral daily  vancomycin  IVPB 750 milliGRAM(s) IV Intermittent every 24 hours    MEDICATIONS  (PRN):          Vital Signs Last 24 Hrs  T(C): 36.6 (14 Oct 2023 22:00), Max: 36.8 (14 Oct 2023 16:19)  T(F): 97.9 (14 Oct 2023 22:00), Max: 98.2 (14 Oct 2023 16:19)  HR: 74 (14 Oct 2023 22:00) (74 - 94)  BP: 99/55 (14 Oct 2023 22:00) (86/55 - 107/59)  BP(mean): --  RR: 18 (14 Oct 2023 22:00) (18 - 24)  SpO2: 90% (14 Oct 2023 22:19) (90% - 98%)    Parameters below as of 14 Oct 2023 22:19  Patient On (Oxygen Delivery Method): room air        Examination:  Cognitive/Language:  The patient is not oriented to person, place, time and date.  Aphasic.    Eyes: intact  EOMI w/o nystagmus.  PERRL.  No ptosis.    Face:  Facial sensation unable to assess no facial asymmetry.    Ears/Nose/Throat:  Hearing grossly intact b/l.  Palate elevates midline.  Tongue and uvula midline.   Motor examination:   moving all extremities  Reflexes:   2+ b/l biceps, triceps, brachioradialis, patella.    Sensory examination:   withdrew from pain all EXT  Cerebellum:   couldn assess  Gait: deferred    Respiratory:  no audible wheezing or inspiratory stridor.  no use of accessory muscles.   Cardiac: pulse palpable, no audible bruits  Abdomen: supple, no guarding, no TTP    Labs:   CBC Full  -  ( 14 Oct 2023 17:18 )  WBC Count : 8.33 K/uL  RBC Count : 5.44 M/uL  Hemoglobin : 12.7 g/dL  Hematocrit : 41.4 %  Platelet Count - Automated : 425 K/uL  Mean Cell Volume : 76.1 fL  Mean Cell Hemoglobin : 23.3 pg  Mean Cell Hemoglobin Concentration : 30.7 g/dL  Auto Neutrophil # : 6.54 K/uL  Auto Lymphocyte # : 1.19 K/uL  Auto Monocyte # : 0.46 K/uL  Auto Eosinophil # : 0.05 K/uL  Auto Basophil # : 0.07 K/uL  Auto Neutrophil % : 78.6 %  Auto Lymphocyte % : 14.3 %  Auto Monocyte % : 5.5 %  Auto Eosinophil % : 0.6 %  Auto Basophil % : 0.8 %    10-14    142  |  101  |  17  ----------------------------<  126<H>  4.5   |  28  |  1.1    Ca    9.1      14 Oct 2023 17:18  Mg     2.3     10-14    TPro  7.7  /  Alb  3.8  /  TBili  0.2  /  DBili  x   /  AST  37  /  ALT  41  /  AlkPhos  125<H>  10-14    LIVER FUNCTIONS - ( 14 Oct 2023 17:18 )  Alb: 3.8 g/dL / Pro: 7.7 g/dL / ALK PHOS: 125 U/L / ALT: 41 U/L / AST: 37 U/L / GGT: x             Urinalysis Basic - ( 14 Oct 2023 17:18 )    Color: x / Appearance: x / SG: x / pH: x  Gluc: 126 mg/dL / Ketone: x  / Bili: x / Urobili: x   Blood: x / Protein: x / Nitrite: x   Leuk Esterase: x / RBC: x / WBC x   Sq Epi: x / Non Sq Epi: x / Bacteria: x          [facility]  [facility]     10-14-23 @ 23:28       Neurology Consult    Patient is a 58y old  F with PMHx of PMHx of possible seizures? (not on any AED), Down syndrome, nonverbal at baseline, hypothyroidism, cerebral palsy, congenital pulmonary stenosis who presents from nursing facility for syncope associated with shaking of upper trunk and UE which was witnessed by aide. Initial vitals at nursing home showed bradycardia and hypoxia for about 10-15 minutes. Aide denies urinary/bowel incontinence, tongue bitting. As per aide at the bedside, shaking episode lasted for few min and then she passed out. Initial vitals at nursing home showed bradycardia and hypoxia for about 10-15 minutes. In the ED, she was hypotension and there was concern about sepsis. She was started on broad spec Abx. Neurology was consulted for seizures. Upon chart review, seizure hx is only mentioned in one of paperwork from NH, no seizure hx during last admission or in the o/p notes.    PAST MEDICAL & SURGICAL HISTORY:  Down syndrome      Osteoporosis      Mild anemia      Neuropathy      S/P debridement  of R hip on 3/2/21          FAMILY HISTORY:      Social History: (-) x 3    Allergies    No Known Allergies    Intolerances        MEDICATIONS  (STANDING):  cefepime   IVPB 2000 milliGRAM(s) IV Intermittent every 12 hours  gabapentin 600 milliGRAM(s) Oral daily  heparin   Injectable 5000 Unit(s) SubCutaneous every 12 hours  melatonin 5 milliGRAM(s) Oral at bedtime  midodrine 5 milliGRAM(s) Oral every 8 hours  multivitamin 1 Tablet(s) Oral daily  pantoprazole    Tablet 40 milliGRAM(s) Oral two times a day  raloxifene 60 milliGRAM(s) Oral daily  vancomycin  IVPB 750 milliGRAM(s) IV Intermittent every 24 hours    MEDICATIONS  (PRN):          Vital Signs Last 24 Hrs  T(C): 36.6 (14 Oct 2023 22:00), Max: 36.8 (14 Oct 2023 16:19)  T(F): 97.9 (14 Oct 2023 22:00), Max: 98.2 (14 Oct 2023 16:19)  HR: 74 (14 Oct 2023 22:00) (74 - 94)  BP: 99/55 (14 Oct 2023 22:00) (86/55 - 107/59)  BP(mean): --  RR: 18 (14 Oct 2023 22:00) (18 - 24)  SpO2: 90% (14 Oct 2023 22:19) (90% - 98%)    Parameters below as of 14 Oct 2023 22:19  Patient On (Oxygen Delivery Method): room air        Examination:  Cognitive/Language:  The patient is not oriented to person, place, time and date.  Aphasic.    Eyes: intact  EOMI w/o nystagmus.  PERRL.  No ptosis.    Face:  Facial sensation unable to assess no facial asymmetry.    Ears/Nose/Throat:  Hearing grossly intact b/l.  Palate elevates midline.  Tongue and uvula midline.   Motor examination:   moving all extremities  Reflexes:   2+ b/l biceps, triceps, brachioradialis, patella.    Sensory examination:   withdrew from pain all EXT  Cerebellum:   couldn assess  Gait: deferred        Labs:   CBC Full  -  ( 14 Oct 2023 17:18 )  WBC Count : 8.33 K/uL  RBC Count : 5.44 M/uL  Hemoglobin : 12.7 g/dL  Hematocrit : 41.4 %  Platelet Count - Automated : 425 K/uL  Mean Cell Volume : 76.1 fL  Mean Cell Hemoglobin : 23.3 pg  Mean Cell Hemoglobin Concentration : 30.7 g/dL  Auto Neutrophil # : 6.54 K/uL  Auto Lymphocyte # : 1.19 K/uL  Auto Monocyte # : 0.46 K/uL  Auto Eosinophil # : 0.05 K/uL  Auto Basophil # : 0.07 K/uL  Auto Neutrophil % : 78.6 %  Auto Lymphocyte % : 14.3 %  Auto Monocyte % : 5.5 %  Auto Eosinophil % : 0.6 %  Auto Basophil % : 0.8 %    10-14    142  |  101  |  17  ----------------------------<  126<H>  4.5   |  28  |  1.1    Ca    9.1      14 Oct 2023 17:18  Mg     2.3     10-14    TPro  7.7  /  Alb  3.8  /  TBili  0.2  /  DBili  x   /  AST  37  /  ALT  41  /  AlkPhos  125<H>  10-14    LIVER FUNCTIONS - ( 14 Oct 2023 17:18 )  Alb: 3.8 g/dL / Pro: 7.7 g/dL / ALK PHOS: 125 U/L / ALT: 41 U/L / AST: 37 U/L / GGT: x             Urinalysis Basic - ( 14 Oct 2023 17:18 )    Color: x / Appearance: x / SG: x / pH: x  Gluc: 126 mg/dL / Ketone: x  / Bili: x / Urobili: x   Blood: x / Protein: x / Nitrite: x   Leuk Esterase: x / RBC: x / WBC x   Sq Epi: x / Non Sq Epi: x / Bacteria: x          [facility]     10-14-23 @ 23:28

## 2023-10-14 NOTE — ED ADULT TRIAGE NOTE - BEFAST SPEECH PHRASE
Seeing neurology - had   No recurrent symptoms recently- 2 years after the intiial injury had collapsed in bathroom at  [address] [place] [address]- due to  hypoglycemia Yes

## 2023-10-14 NOTE — H&P ADULT - ATTENDING COMMENTS
Patient seen and examined at bedside independently of the residents. I read the resident's note and agree with the plan with the additions and corrections as noted below. My note supersedes the resident's note.     REVIEW OF SYSTEMS:  Negative except in HPI.     PMH:  cerebral palsy, Down syndrome, Seizure and Hypothyroidism    FHx: Reviewed. No fhx of asthma/copd, No fhx of liver and pulmonary disease. No fhx of hematological disorder.     Physical Exam:  GEN: No acute distress. Awake, Alert, but non verbal.   Head: Atraumatic, Normocephalic.   Eye: PEERLA. No sclera icterus.   ENT: Normal oropharynx, no thyromegaly, no mass, no lymphadenopathy.   LUNGS: Clear to auscultation bilaterally. No wheeze/rales/crackles.   HEART: Normal. S1/S2 present. RRR. No murmur/gallops.   ABD: Soft, non-tender, non-distended. Bowel sounds present.   EXT: No pitting edema. No erythema. No tenderness. Right foot has 2 deep decubitus ulcers with purulent drainage at plantar surface.   Integumentary: No rash, No sore, No petechia. + stage II decubitus ulcer at buttock  NEURO: Moving extremities but extremities are contracted.     Vital Signs Last 24 Hrs  T(C): 36.6 (14 Oct 2023 22:00), Max: 36.8 (14 Oct 2023 16:19)  T(F): 97.9 (14 Oct 2023 22:00), Max: 98.2 (14 Oct 2023 16:19)  HR: 74 (14 Oct 2023 22:00) (74 - 94)  BP: 99/55 (14 Oct 2023 22:00) (86/55 - 107/59)  BP(mean): --  RR: 18 (14 Oct 2023 22:00) (18 - 24)  SpO2: 90% (14 Oct 2023 22:19) (90% - 98%)    Parameters below as of 14 Oct 2023 22:19  Patient On (Oxygen Delivery Method): room air      Please see the above notes for Labs and radiology.     Assessment and Plan:     59 yo F with hx of cerebral palsy, Down syndrome, Seizure and Hypothyroidism presents from facility for syncope with tonic-clonic movement.     Seizure like activity   - will get CTH   - check REEG  - check serum Mg (keep serum Mg > 2)  - seizure precaution.   - monitor on Telemetry.   - neuro evaluation.     Hypotension   - Ddx: right foot infected decubitus ulcer with suspected OM vs. PNA   - No fever/WBC.   - CXR: b/l patchy opacity to my read which is unchanged from previous (wet read) --> pending official report.   - will cover with Vanc and Cefepime for now.   - check BCx, UA, UCx and procalcitonin and MRSA swab  - check ESR/CRP   - check X-ray of right foot   - on midodrine   - Podiatry consult   - ID consult.     Buttock stage II decubitus ulcer   - wound care consult.     GERD - PPI   Hypothyroidism - c/w home med  Cerebral palsy/Down syndrome - at baseline.     DVT ppx: Heparin SC  GI ppx: PPI  Diet: soft diet   Activity: as tolerated.     Date seen by the attending: 10/14/2023  Total time spent: 75 minutes.

## 2023-10-14 NOTE — ED ADULT NURSE NOTE - NSFALLASSISTNEEDED_ED_ALL_ED
Pasquale Salgado Standing/Walking/Toileting/Moving from bed to stretcher

## 2023-10-14 NOTE — ED PROVIDER NOTE - NS ED ATTENDING STATEMENT MOD
This was a shared visit with the LILA. I reviewed and verified the documentation and independently performed the documented:

## 2023-10-14 NOTE — ED ADULT NURSE NOTE - NSFALLRISKINTERV_ED_ALL_ED
Assistance OOB with selected safe patient handling equipment if applicable/Assistance with ambulation/Communicate fall risk and risk factors to all staff, patient, and family/Monitor gait and stability/Provide visual cue: yellow wristband, yellow gown, etc/Reinforce activity limits and safety measures with patient and family/Use of alarms - bed, stretcher, chair and/or video monitoring/Call bell, personal items and telephone in reach/Instruct patient to call for assistance before getting out of bed/chair/stretcher/Non-slip footwear applied when patient is off stretcher/[place] to call system/Physically safe environment - no spills, clutter or unnecessary equipment/Purposeful Proactive Rounding/Room/bathroom lighting operational, light cord in reach

## 2023-10-14 NOTE — ED PROVIDER NOTE - ATTENDING APP SHARED VISIT CONTRIBUTION OF CARE
58-year-old female with a history of developmental delay, nonverbal, bedbound, cerebral palsy presents to the ED for tonic-clonic movements witnessed by aide.  History of seizures.  Initial vitals noted to have borderline hypotension.  Prior history of orthostatic hypotension.  Remainder the physical exam was unremarkable.  On my evaluation patient is resting comfortably not in acute distress.  Concern for seizures versus sepsis so we obtained labs along with blood cultures.  Given fluids.

## 2023-10-14 NOTE — CONSULT NOTE ADULT - ASSESSMENT
cefepime 58y old  F with PMHx of PMHx of possible seizures? (not on any AED), Down syndrome, nonverbal at baseline, hypothyroidism, cerebral palsy, congenital pulmonary stenosis who presented for upper body shaking and syncope episode. In the setting of bradycardia, severe hypotension and  lack of urinary/bowel incontinence or tongue bitting makes seizures unlikely. Would be more concern about hypotension leading to low perfusion in the brain which may cause shaking. However cant r/o seizure at this time. Upon chart review, seizure hx is only mentioned in one of paperwork from NH, no seizure hx during last admission or in the o/p notes.     Recommendations:  - Obtain REEG   - Will recommend switching Cefepime to other Abx if possible, as Cefepime can lower seizure threshold  - Ativan 2mg IV for GTCs > 2 min only  - Neurological assessment Q8hrs  - Seizure & Fall precautions  - Maintain Mg> 2 mmol/l  - Gen Neuro team will follow    Pending attending attestation.    Michael Stahl MD  PGY2, Neurology

## 2023-10-14 NOTE — ED PROVIDER NOTE - CLINICAL SUMMARY MEDICAL DECISION MAKING FREE TEXT BOX
58-year-old female presents to the ED from [facility] nursing facility for possible syncopal event with "shaking" activity.  Additionally patient was noted to be hypoxic and bradycardic at the facility.  Triage vitals noted to be 86/55 with respiratory rate of 24.  Previous vitals noted in patient's chart with intermittent hypotension as the patient has a history of orthostatic hypotension and is on midodrine.  Patient was afebrile and my physical exam noted to individual that was contracted and nonverbal.  Not in acute distress.  Repeat blood pressure 107/59 and rectal temp was afebrile.  Concern for seizure activity versus sepsis so we obtained labs which noted to have mildly elevated troponin which was noted on previous admissions as well.  Elevated BNP.  Chest x-ray unchanged from previous admission.  Opacities noted.  EKG unremarkable.  Patient admitted for syncopal versus seizure and evaluation for occult sepsis.

## 2023-10-14 NOTE — ED ADULT NURSE NOTE - NSFALLRSKUNASSIST_ED_ALL_ED
To ED with c/o right head pain radiating down right neck and right arm that started yesterday with blurriness to right eye that started today. Reports general fatigue that started today  denies f/c  Denies h/o headaches/migraines    
no

## 2023-10-14 NOTE — CONSULT NOTE ADULT - ATTENDING COMMENTS
Patient seen and examined and agree with above except as noted.  Patients history, notes, labs, imaging, vitals and meds reviewed personally.  History of seizure from NH documents other then gabapentin no other medications which maybe considered ASM.  Episode suspicious for cardiac event leading to drop in BP and HR  Unclear if epileptic seizure    Plan as above

## 2023-10-15 LAB
ALBUMIN SERPL ELPH-MCNC: 3.4 G/DL — LOW (ref 3.5–5.2)
ALP SERPL-CCNC: 107 U/L — SIGNIFICANT CHANGE UP (ref 30–115)
ALT FLD-CCNC: 36 U/L — SIGNIFICANT CHANGE UP (ref 0–41)
ANION GAP SERPL CALC-SCNC: 29 MMOL/L — HIGH (ref 7–14)
AST SERPL-CCNC: 37 U/L — SIGNIFICANT CHANGE UP (ref 0–41)
BASOPHILS # BLD AUTO: 0.06 K/UL — SIGNIFICANT CHANGE UP (ref 0–0.2)
BASOPHILS NFR BLD AUTO: 1 % — SIGNIFICANT CHANGE UP (ref 0–1)
BILIRUB SERPL-MCNC: 0.4 MG/DL — SIGNIFICANT CHANGE UP (ref 0.2–1.2)
BUN SERPL-MCNC: 10 MG/DL — SIGNIFICANT CHANGE UP (ref 10–20)
CALCIUM SERPL-MCNC: 8.5 MG/DL — SIGNIFICANT CHANGE UP (ref 8.4–10.5)
CHLORIDE SERPL-SCNC: 91 MMOL/L — LOW (ref 98–110)
CO2 SERPL-SCNC: 23 MMOL/L — SIGNIFICANT CHANGE UP (ref 17–32)
CREAT SERPL-MCNC: 0.8 MG/DL — SIGNIFICANT CHANGE UP (ref 0.7–1.5)
CRP SERPL-MCNC: 16.1 MG/L — HIGH
EGFR: 86 ML/MIN/1.73M2 — SIGNIFICANT CHANGE UP
EOSINOPHIL # BLD AUTO: 0.18 K/UL — SIGNIFICANT CHANGE UP (ref 0–0.7)
EOSINOPHIL NFR BLD AUTO: 3 % — SIGNIFICANT CHANGE UP (ref 0–8)
ERYTHROCYTE [SEDIMENTATION RATE] IN BLOOD: 67 MM/HR — HIGH (ref 0–20)
GLUCOSE SERPL-MCNC: 94 MG/DL — SIGNIFICANT CHANGE UP (ref 70–99)
HCT VFR BLD CALC: 39.1 % — SIGNIFICANT CHANGE UP (ref 37–47)
HGB BLD-MCNC: 12.1 G/DL — SIGNIFICANT CHANGE UP (ref 12–16)
IMM GRANULOCYTES NFR BLD AUTO: 0.3 % — SIGNIFICANT CHANGE UP (ref 0.1–0.3)
LYMPHOCYTES # BLD AUTO: 1.67 K/UL — SIGNIFICANT CHANGE UP (ref 1.2–3.4)
LYMPHOCYTES # BLD AUTO: 28 % — SIGNIFICANT CHANGE UP (ref 20.5–51.1)
MAGNESIUM SERPL-MCNC: 2.2 MG/DL — SIGNIFICANT CHANGE UP (ref 1.8–2.4)
MCHC RBC-ENTMCNC: 23.7 PG — LOW (ref 27–31)
MCHC RBC-ENTMCNC: 30.9 G/DL — LOW (ref 32–37)
MCV RBC AUTO: 76.7 FL — LOW (ref 81–99)
MONOCYTES # BLD AUTO: 0.56 K/UL — SIGNIFICANT CHANGE UP (ref 0.1–0.6)
MONOCYTES NFR BLD AUTO: 9.4 % — HIGH (ref 1.7–9.3)
NEUTROPHILS # BLD AUTO: 3.47 K/UL — SIGNIFICANT CHANGE UP (ref 1.4–6.5)
NEUTROPHILS NFR BLD AUTO: 58.3 % — SIGNIFICANT CHANGE UP (ref 42.2–75.2)
NRBC # BLD: 0 /100 WBCS — SIGNIFICANT CHANGE UP (ref 0–0)
PLATELET # BLD AUTO: 368 K/UL — SIGNIFICANT CHANGE UP (ref 130–400)
PMV BLD: 10 FL — SIGNIFICANT CHANGE UP (ref 7.4–10.4)
POTASSIUM SERPL-MCNC: 3.8 MMOL/L — SIGNIFICANT CHANGE UP (ref 3.5–5)
POTASSIUM SERPL-SCNC: 3.8 MMOL/L — SIGNIFICANT CHANGE UP (ref 3.5–5)
PROT SERPL-MCNC: 6.7 G/DL — SIGNIFICANT CHANGE UP (ref 6–8)
RBC # BLD: 5.1 M/UL — SIGNIFICANT CHANGE UP (ref 4.2–5.4)
RBC # FLD: 25.4 % — HIGH (ref 11.5–14.5)
SODIUM SERPL-SCNC: 143 MMOL/L — SIGNIFICANT CHANGE UP (ref 135–146)
WBC # BLD: 5.96 K/UL — SIGNIFICANT CHANGE UP (ref 4.8–10.8)
WBC # FLD AUTO: 5.96 K/UL — SIGNIFICANT CHANGE UP (ref 4.8–10.8)

## 2023-10-15 PROCEDURE — 70450 CT HEAD/BRAIN W/O DYE: CPT | Mod: 26

## 2023-10-15 PROCEDURE — 95816 EEG AWAKE AND DROWSY: CPT | Mod: 26

## 2023-10-15 PROCEDURE — 99232 SBSQ HOSP IP/OBS MODERATE 35: CPT

## 2023-10-15 PROCEDURE — 73630 X-RAY EXAM OF FOOT: CPT | Mod: 26,RT

## 2023-10-15 RX ADMIN — CEFEPIME 100 MILLIGRAM(S): 1 INJECTION, POWDER, FOR SOLUTION INTRAMUSCULAR; INTRAVENOUS at 05:37

## 2023-10-15 RX ADMIN — HEPARIN SODIUM 5000 UNIT(S): 5000 INJECTION INTRAVENOUS; SUBCUTANEOUS at 17:01

## 2023-10-15 RX ADMIN — PANTOPRAZOLE SODIUM 40 MILLIGRAM(S): 20 TABLET, DELAYED RELEASE ORAL at 05:37

## 2023-10-15 RX ADMIN — Medication 250 MILLIGRAM(S): at 22:27

## 2023-10-15 RX ADMIN — MIDODRINE HYDROCHLORIDE 5 MILLIGRAM(S): 2.5 TABLET ORAL at 05:37

## 2023-10-15 RX ADMIN — RALOXIFENE HYDROCHLORIDE 60 MILLIGRAM(S): 60 TABLET, COATED ORAL at 11:21

## 2023-10-15 RX ADMIN — Medication 5 MILLIGRAM(S): at 22:27

## 2023-10-15 RX ADMIN — Medication 1 TABLET(S): at 11:22

## 2023-10-15 RX ADMIN — MIDODRINE HYDROCHLORIDE 5 MILLIGRAM(S): 2.5 TABLET ORAL at 22:27

## 2023-10-15 RX ADMIN — GABAPENTIN 600 MILLIGRAM(S): 400 CAPSULE ORAL at 11:21

## 2023-10-15 RX ADMIN — CEFEPIME 100 MILLIGRAM(S): 1 INJECTION, POWDER, FOR SOLUTION INTRAMUSCULAR; INTRAVENOUS at 17:01

## 2023-10-15 RX ADMIN — PANTOPRAZOLE SODIUM 40 MILLIGRAM(S): 20 TABLET, DELAYED RELEASE ORAL at 17:01

## 2023-10-15 RX ADMIN — MIDODRINE HYDROCHLORIDE 5 MILLIGRAM(S): 2.5 TABLET ORAL at 14:09

## 2023-10-15 RX ADMIN — HEPARIN SODIUM 5000 UNIT(S): 5000 INJECTION INTRAVENOUS; SUBCUTANEOUS at 05:37

## 2023-10-15 NOTE — CONSULT NOTE ADULT - SUBJECTIVE AND OBJECTIVE BOX
Podiatry Consult Note    Subjective:  TEA ECHAVARRIA  Seen Bedside 58y Female  .   Patient is a 58y old  Female who presents with a chief complaint of Pre-syncope (14 Oct 2023 22:56)    HPI:  Patient is a 58 year old female with a PMHx of Down syndrome, nonverbal at baseline, hypothyroidism, cerebral palsy, congenital pulmonary stenosis, Seizures, presents to the ED from nursing facility for syncope associated with tonic-clonic movement witnessed by aide. Initial vitals at nursing home showed bradycardia and hypoxia for about 10-15 minutes. No fevers, chill, cough, vomiting, diarrhea, difficulty breathing.     Hospital course 9/29/2023 -> 10/13/2023 Patient was admitted  for hemoptysis and duodenal perforation requiring intubation and ICU admission for hypovolemic and septic shock requiring temporary pressor support, which resolved. Patient also had acute hypoxemic respiratory failure likely due to multifocal pneumonia. Patient was successfully extubated and completed a course of antibiotics per ID, further infectious workup was unremarkable. Repeat CTAP with contrast showed no contrast extravasation and previous seen foci of retroperitoneal gas no longer identified. The patient was evaluated by surgery and GI, and no acute intervention recommended. Patient with stable hemoglobin. Hospital course complicated by NELA likely prerenal which resolved and NSTEMI likely type II due to shock (Echo 9/30 EF 55 to 60%, Normal left systolic function Diastolic function could not be assessed) with subsequent downtrending trops (0.34 --> 0.11). Patient's hospital course ICU --> Stepdown --> General medical floor. Patient remains hemodynamically stable on general medical, tolerating pureed feeds, and has bowel movements. Patient was discharged on 10/13/2023       In ED:  - Vital signs: BP: 86/55, HR: 77, RR: 24, SpO2: 98% on 4L NC  - Labs showed troponin: 0,02 ( was 0.34 and then 0,11 2 weeks ago), Creatinine 1.1 ( 0.7 on 10/13)    (14 Oct 2023 20:33)      Past Medical History and Surgical History  PAST MEDICAL & SURGICAL HISTORY:  Down syndrome      Osteoporosis      Mild anemia      Neuropathy      S/P debridement  of R hip on 3/2/21           Review of Systems:  [X] Ten point review of systems is otherwise negative except as noted     Objective:  Vital Signs Last 24 Hrs  T(C): 35.9 (15 Oct 2023 05:00), Max: 36.8 (14 Oct 2023 16:19)  T(F): 96.7 (15 Oct 2023 05:00), Max: 98.2 (14 Oct 2023 16:19)  HR: 70 (15 Oct 2023 05:00) (70 - 94)  BP: 100/59 (15 Oct 2023 05:00) (86/55 - 107/59)  BP(mean): --  RR: 18 (15 Oct 2023 05:00) (18 - 24)  SpO2: 95% (15 Oct 2023 00:00) (90% - 98%)    Parameters below as of 15 Oct 2023 00:00  Patient On (Oxygen Delivery Method): room air                            12.1   5.96  )-----------( 368      ( 15 Oct 2023 07:28 )             39.1                 10-15    143  |  91<L>  |  10  ----------------------------<  94  3.8   |  23  |  0.8    Ca    8.5      15 Oct 2023 07:28  Mg     2.2     10-15    TPro  6.7  /  Alb  3.4<L>  /  TBili  0.4  /  DBili  x   /  AST  37  /  ALT  36  /  AlkPhos  107  10-15        Physical Exam - Lower Extremity Focused:   Derm:   Two plantar ulcerations of R foot; full thickness; both measuring approximately 2.9cm x 2.5 x 0.4cm; probes to joint capsule/soft tissue; with heavy serous drainage. mild periwound erythema. No fluctuance. No malodors.  Stable scabs of distal ends of 2nd and 3rd toes of R foot;     Vascular: DP and PT Pulses Diminished; Foot is Warm to Warm to the touch; Capillary Refill Time < 3 Seconds;    Neuro: Protective Sensation intact to light touch    MSK: Mild Pain On Palpation at Wound Site    s/p R 1st ray amputation     Assessment:  Right Foot Ulcerations;     Plan:  Chart reviewed and Patient evaluated. All Questions and Concerns Addressed and Answered  XR Imaging R Foot; Pending Results; R/o OM  Local Wound Care; Wound Flushed w/ NS; Wound Packed w/ xeroform / DSD / Kerlix   Continue with local wound care q24h;   Weight Bearing Status; WBAT  Possible Surgical Debridement; Pending XR Imaging; R/o OM; If no OM found patient stable per Podiatry standpoint;   Discussed Plan w/ attending Dr. Everett     Podiatry

## 2023-10-15 NOTE — CONSULT NOTE ADULT - ASSESSMENT
ASSESSMENT   58 year old female with a PMHx of Down syndrome, nonverbal at baseline, hypothyroidism, cerebral palsy, congenital pulmonary stenosis, Seizures, presents to the ED from nursing facility for syncope associated with tonic-clonic movement witnessed by aide. Initial vitals at nursing home showed bradycardia and hypoxia for about 10-15 minutes. N    IMPRESSION  #Seizure like activity   #Right planter foot ulcers:     #Buttock decubitus ulcer     #Down syndrome/Crebral Palsy   #Obesity BMI (kg/m2): 23.7, 26.3  #Abx allergy: No Known Allergies    RECOMMENDATIONS  This is a preliminary incomplete pended note, all final recommendations to follow after interview and examination of the patient.    Please call or message on [employer] Teams if with any questions.  Spectra [phone]       ASSESSMENT   58 year old female with a PMHx of Down syndrome, nonverbal at baseline, hypothyroidism, cerebral palsy, congenital pulmonary stenosis, Seizures, presents to the ED from nursing facility for syncope associated with tonic-clonic movement witnessed by aide. Initial vitals at nursing home showed bradycardia and hypoxia for about 10-15 minutes. N    IMPRESSION  #Seizure like activity   #Right planter foot ulcers:     #Buttock decubitus ulcer     #Recently Treated Multifocal PNA    #Down syndrome/Crebral Palsy   #Obesity BMI (kg/m2): 23.7, 26.3  #Abx allergy: No Known Allergies    RECOMMENDATIONS  This is a preliminary incomplete pended note, all final recommendations to follow after interview and examination of the patient.    Please call or message on [employer] Teams if with any questions.  Spectra [phone]       ASSESSMENT   58 year old female with a PMHx of Down syndrome, nonverbal at baseline, hypothyroidism, cerebral palsy, congenital pulmonary stenosis, Seizures, presents to the ED from nursing facility for syncope associated with tonic-clonic movement witnessed by aide. Initial vitals at nursing home showed bradycardia and hypoxia for about 10-15 minutes. N    IMPRESSION  #Seizure like activity   #Right planter foot ulcers - two full thickness ulcers - serous drainage with mild erythema     #Buttock decubitus ulcer     #Recently Treated Multifocal PNA    #Down syndrome/Crebral Palsy   #Obesity BMI (kg/m2): 23.7, 26.3  #Abx allergy: No Known Allergies    RECOMMENDATIONS  - stop vancomycin/cefepime -- narrow to cefazolin 1g q 8 hours  - follow-up Foot X ray -- if negative, would obtain MRI     Please call or message on [employer] Teams if with any questions.  Spectra [phone]

## 2023-10-15 NOTE — PROGRESS NOTE ADULT - SUBJECTIVE AND OBJECTIVE BOX
TEA ECHAVARRIA  58y  Female      Patient is a 58y old  Female who presents with a chief complaint of Pre-syncope (15 Oct 2023 11:17)      INTERVAL HPI/OVERNIGHT EVENTS:  She is non verbal, her aid at beside, she looks stable, comfortable.   Vital Signs Last 24 Hrs  T(C): 35.9 (15 Oct 2023 05:00), Max: 36.8 (14 Oct 2023 16:19)  T(F): 96.7 (15 Oct 2023 05:00), Max: 98.2 (14 Oct 2023 16:19)  HR: 70 (15 Oct 2023 05:00) (70 - 94)  BP: 100/59 (15 Oct 2023 05:00) (86/55 - 107/59)  BP(mean): --  RR: 18 (15 Oct 2023 05:00) (18 - 24)  SpO2: 95% (15 Oct 2023 00:00) (90% - 98%)    Parameters below as of 15 Oct 2023 00:00  Patient On (Oxygen Delivery Method): room air          10-14-23 @ 07:01  -  10-15-23 @ 07:00  --------------------------------------------------------  IN: 240 mL / OUT: 800 mL / NET: -560 mL    10-15-23 @ 07:01  -  10-15-23 @ 12:37  --------------------------------------------------------  IN: 210 mL / OUT: 0 mL / NET: 210 mL            Consultant(s) Notes Reviewed:  [x ] YES  [ ] NO          MEDICATIONS  (STANDING):  cefepime   IVPB 2000 milliGRAM(s) IV Intermittent every 12 hours  gabapentin 600 milliGRAM(s) Oral daily  heparin   Injectable 5000 Unit(s) SubCutaneous every 12 hours  melatonin 5 milliGRAM(s) Oral at bedtime  midodrine 5 milliGRAM(s) Oral every 8 hours  multivitamin 1 Tablet(s) Oral daily  pantoprazole    Tablet 40 milliGRAM(s) Oral two times a day  raloxifene 60 milliGRAM(s) Oral daily  vancomycin  IVPB 750 milliGRAM(s) IV Intermittent every 24 hours    MEDICATIONS  (PRN):      LABS                          12.1   5.96  )-----------( 368      ( 15 Oct 2023 07:28 )             39.1     10-15    143  |  91<L>  |  10  ----------------------------<  94  3.8   |  23  |  0.8    Ca    8.5      15 Oct 2023 07:28  Mg     2.2     10-15    TPro  6.7  /  Alb  3.4<L>  /  TBili  0.4  /  DBili  x   /  AST  37  /  ALT  36  /  AlkPhos  107  10-15      Urinalysis Basic - ( 15 Oct 2023 07:28 )    Color: x / Appearance: x / SG: x / pH: x  Gluc: 94 mg/dL / Ketone: x  / Bili: x / Urobili: x   Blood: x / Protein: x / Nitrite: x   Leuk Esterase: x / RBC: x / WBC x   Sq Epi: x / Non Sq Epi: x / Bacteria: x        Lactate Trend    CARDIAC MARKERS ( 14 Oct 2023 17:18 )  x     / 0.02 ng/mL / x     / x     / x          CAPILLARY BLOOD GLUCOSE            RADIOLOGY & ADDITIONAL TESTS:    Imaging Personally Reviewed:  [ ] YES  [ ] NO    HEALTH ISSUES - PROBLEM Dx:          PHYSICAL EXAM:  GENERAL: down syndrome feature, stable, awake.   HEAD:  Atraumatic, Normocephalic.  EYES: EOMI, PERRLA, conjunctiva and sclera clear.  NECK: Supple, No JVD.  CHEST/LUNG: Clear to auscultation bilaterally; No wheeze.  HEART: Regular rate and rhythm; S1 S2.   ABDOMEN: Soft, Nontender, Nondistended; Bowel sounds present.  EXTREMITIES:  right plantar aspect ulcer with mild purulent discharge, multiple pressure ulcers on the toes tips.   PSYCH:  non-verbal.  NEUROLOGY: lower extremities are contracted,   SKIN: decubitus ulcer stage II

## 2023-10-15 NOTE — CONSULT NOTE ADULT - SUBJECTIVE AND OBJECTIVE BOX
JERICHO TEA  58y, Female  Allergy: No Known Allergies      CHIEF COMPLAINT: Pre-syncope (15 Oct 2023 12:19)      LOS  1d    HPI:  Patient is a 58 year old female with a PMHx of Down syndrome, nonverbal at baseline, hypothyroidism, cerebral palsy, congenital pulmonary stenosis, Seizures, presents to the ED from nursing facility for syncope associated with tonic-clonic movement witnessed by aide. Initial vitals at nursing home showed bradycardia and hypoxia for about 10-15 minutes. No fevers, chill, cough, vomiting, diarrhea, difficulty breathing.     Hospital course 9/29/2023 -> 10/13/2023 Patient was admitted  for hemoptysis and duodenal perforation requiring intubation and ICU admission for hypovolemic and septic shock requiring temporary pressor support, which resolved. Patient also had acute hypoxemic respiratory failure likely due to multifocal pneumonia. Patient was successfully extubated and completed a course of antibiotics per ID, further infectious workup was unremarkable. Repeat CTAP with contrast showed no contrast extravasation and previous seen foci of retroperitoneal gas no longer identified. The patient was evaluated by surgery and GI, and no acute intervention recommended. Patient with stable hemoglobin. Hospital course complicated by NELA likely prerenal which resolved and NSTEMI likely type II due to shock (Echo 9/30 EF 55 to 60%, Normal left systolic function Diastolic function could not be assessed) with subsequent downtrending trops (0.34 --> 0.11). Patient's hospital course ICU --> Stepdown --> General medical floor. Patient remains hemodynamically stable on general medical, tolerating pureed feeds, and has bowel movements. Patient was discharged on 10/13/2023       In ED:  - Vital signs: BP: 86/55, HR: 77, RR: 24, SpO2: 98% on 4L NC  - Labs showed troponin: 0,02 ( was 0.34 and then 0,11 2 weeks ago), Creatinine 1.1 ( 0.7 on 10/13)    (14 Oct 2023 20:33)      INFECTIOUS DISEASE HISTORY:  History as above.  ID consulted for right foot ulcer  Hypotensive on admission -   Placed on 4L on admission now on room air.   WBC Count: 8.33 K/uL (10.14.23 @ 17:18)      PAST MEDICAL & SURGICAL HISTORY:  Down syndrome      Osteoporosis      Mild anemia      Neuropathy      S/P debridement  of R hip on 3/2/21          FAMILY HISTORY  No pertinent family history in first degree relatives        SOCIAL HISTORY  Social History:  Lives at nursing home (14 Oct 2023 20:33)        ROS  unable to obtain history secondary to patient's mental status and/or sedation      VITALS:  T(F): 96.7, Max: 98.2 (10-14-23 @ 16:19)  HR: 78  BP: 102/62  RR: 18Vital Signs Last 24 Hrs  T(C): 35.9 (15 Oct 2023 12:52), Max: 36.8 (14 Oct 2023 16:19)  T(F): 96.7 (15 Oct 2023 12:52), Max: 98.2 (14 Oct 2023 16:19)  HR: 78 (15 Oct 2023 12:52) (70 - 94)  BP: 102/62 (15 Oct 2023 12:52) (86/55 - 107/59)  BP(mean): --  RR: 18 (15 Oct 2023 12:52) (18 - 24)  SpO2: 97% (15 Oct 2023 12:52) (90% - 98%)    Parameters below as of 15 Oct 2023 12:52  Patient On (Oxygen Delivery Method): room air        PHYSICAL EXAM:  Gen: NAD, resting in bed  HEENT: Normocephalic, atraumatic  Neck: supple, no lymphadenopathy  CV: Regular rate & regular rhythm  Lungs: decreased BS at bases, no fremitus  Abdomen: Soft, BS present  Ext: Warm, well perfused  Neuro: non focal, awake  Skin: no rash, no erythema  Lines: no phlebitis    TESTS & MEASUREMENTS:                        12.1   5.96  )-----------( 368      ( 15 Oct 2023 07:28 )             39.1     10-15    143  |  91<L>  |  10  ----------------------------<  94  3.8   |  23  |  0.8    Ca    8.5      15 Oct 2023 07:28  Mg     2.2     10-15    TPro  6.7  /  Alb  3.4<L>  /  TBili  0.4  /  DBili  x   /  AST  37  /  ALT  36  /  AlkPhos  107  10-15      LIVER FUNCTIONS - ( 15 Oct 2023 07:28 )  Alb: 3.4 g/dL / Pro: 6.7 g/dL / ALK PHOS: 107 U/L / ALT: 36 U/L / AST: 37 U/L / GGT: x           Urinalysis Basic - ( 15 Oct 2023 07:28 )    Color: x / Appearance: x / SG: x / pH: x  Gluc: 94 mg/dL / Ketone: x  / Bili: x / Urobili: x   Blood: x / Protein: x / Nitrite: x   Leuk Esterase: x / RBC: x / WBC x   Sq Epi: x / Non Sq Epi: x / Bacteria: x        Culture - Sputum (collected 10-04-23 @ 12:00)  Source: Trach Asp Tracheal Aspirate  Gram Stain (10-04-23 @ 22:58):    Few polymorphonuclear leukocytes per low power field    Rare Squamous epithelial cells per low power field    No organisms seen per oil power field  Final Report (10-06-23 @ 10:31):    No growth    Culture - Sputum (collected 10-02-23 @ 11:10)  Source: Trach Asp Tracheal Aspirate  Gram Stain (10-03-23 @ 07:33):    Moderate polymorphonuclear leukocytes per low power field    Rare Squamous epithelial cells per low power field    Few Gram positive cocci in pairs seen per oil power field    Rare Gram Positive Rods seen per oil power field    Rare Yeast like cells seen per oil power field  Final Report (10-04-23 @ 17:12):    No growth at 48 hours    Culture - Urine (collected 09-29-23 @ 10:10)  Source: Clean Catch Clean Catch (Midstream)  Final Report (10-01-23 @ 01:13):    <10,000 CFU/mL Normal Urogenital Mili    Culture - Blood (collected 09-29-23 @ 09:47)  Source: .Blood Blood-Peripheral  Final Report (10-04-23 @ 17:00):    No growth at 5 days    Culture - Blood (collected 09-29-23 @ 09:47)  Source: .Blood Blood-Peripheral  Gram Stain (10-01-23 @ 09:15):    Growth in aerobic bottle: Gram Positive Rods and Gram positive cocci in    pairs    Growth in anaerobic bottle: Gram positive cocci in pairs  Final Report (10-05-23 @ 22:52):    Growth in aerobic bottle: Corynebacterium amycolatum "Susceptibilities    not performed"    Growth in anaerobic bottle: Peptoniphilus harei group "Susceptibilities    not performed"    Growth in aerobic bottle: Gram positive cocci in pairs    Organism seen in Gram stain is non-viable after prolonged    incubation and repeated subculture.    Direct identification is available within approximately 3-5    hours either by Blood Panel Multiplexed PCR or Direct    MALDI-TOF. Details: [email or web address]  Organism: Blood Culture PCR (10-05-23 @ 22:52)  Organism: Blood Culture PCR (10-05-23 @ 22:52)      Method Type: PCR      -  Blood PCR Panel: NEG    Culture - Urine (collected 08-12-23 @ 02:54)  Source: Clean Catch Clean Catch (Midstream)  Final Report (08-13-23 @ 06:44):    No growth    Culture - Blood (collected 08-12-23 @ 01:03)  Source: .Blood Blood-Peripheral  Final Report (08-17-23 @ 09:00):    No growth at 5 days    Culture - Blood (collected 08-12-23 @ 01:03)  Source: .Blood Blood-Peripheral  Final Report (08-17-23 @ 09:00):    No growth at 5 days    Culture - Urine (collected 08-04-23 @ 02:08)  Source: Clean Catch Clean Catch (Midstream)  Final Report (08-07-23 @ 18:10):    >100,000 CFU/ml Escherichia coli ESBL  Organism: Escherichia coli ESBL (08-07-23 @ 18:10)  Organism: Escherichia coli ESBL (08-07-23 @ 18:10)      Method Type: ZANE      -  Amikacin: S <=16      -  Amoxicillin/Clavulanic Acid: S <=8/4      -  Ampicillin: R >16 These ampicillin results predict results for amoxicillin      -  Ampicillin/Sulbactam: S <=4/2 Enterobacter, Klebsiella aerogenes, Citrobacter, and Serratia may develop resistance during prolonged therapy (3-4 days)      -  Aztreonam: R >16      -  Cefazolin: R >16 For uncomplicated UTI with K. pneumoniae, E. coli, or P. mirablis: ZANE <=16 is sensitive and ZANE >=32 is resistant. This also predicts results for oral agents cefaclor, cefdinir, cefpodoxime, cefprozil, cefuroxime axetil, cephalexin and locarbef for uncomplicated UTI. Note that some isolates may be susceptible to these agents while testing resistant to cefazolin.      -  Cefepime: R 4      -  Ceftriaxone: R >32 Enterobacter, Klebsiella aerogenes, Citrobacter, and Serratia may develop resistance during prolonged therapy      -  Cefuroxime: R >16      -  Ciprofloxacin: R >2      -  Ertapenem: S <=0.5      -  Gentamicin: S <=2      -  Imipenem: S <=1      -  Levofloxacin: R >4      -  Meropenem: S <=1      -  Nitrofurantoin: S <=32 Should not be used to treat pyelonephritis      -  Piperacillin/Tazobactam: S <=8      -  Tobramycin: S <=2      -  Trimethoprim/Sulfamethoxazole: R >2/38    Culture - Blood (collected 08-04-23 @ 01:05)  Source: .Blood Blood-Peripheral  Final Report (08-09-23 @ 09:01):    No growth at 5 days    Culture - Blood (collected 08-04-23 @ 01:05)  Source: .Blood Blood-Peripheral  Final Report (08-09-23 @ 09:01):    No growth at 5 days        Blood Gas Venous - Lactate: 1.90 mmol/L (10-14-23 @ 18:26)      INFECTIOUS DISEASES TESTING  COVID-19 PCR: NotDetec (10-12-23 @ 09:14)  Procalcitonin, Serum: 0.40 ng/mL (10-07-23 @ 10:54)  Legionella Antigen, Urine: Negative (09-30-23 @ 14:36)  MRSA PCR Result.: Negative (09-29-23 @ 16:50)  Procalcitonin, Serum: 9.78 ng/mL (08-12-23 @ 11:25)  MRSA PCR Result.: Negative (08-12-23 @ 06:10)  Procalcitonin, Serum: 0.63 ng/mL (08-10-23 @ 08:46)  Procalcitonin, Serum: 7.82 ng/mL (08-04-23 @ 16:13)  MRSA PCR Result.: Negative (08-04-23 @ 14:52)      RADIOLOGY & ADDITIONAL TESTS:  I have personally reviewed the last Chest xray  CXR  Xray Chest 1 View- PORTABLE-Urgent:   ACC: [id number] EXAM:  XR CHEST PORTABLE URGENT 1V   ORDERED BY: LESVIA MELÉNDEZ     PROCEDURE DATE:  10/14/2023          INTERPRETATION:  CLINICAL HISTORY: syncope.    COMPARISON: 10/9/2023.    TECHNIQUE: Portable frontal chest radiograph. Adequatepositioning.    FINDINGS:    Support devices: None.    Cardiac/mediastinum/hilum: Unremarkable.    Lung parenchyma/Pleura: Stable lower lobe interstitial opacities   bilaterally. No pleural effusion or pneumothorax.    Skeleton/soft tissues: Stable.      IMPRESSION:    Stable lower lobe interstitial opacities bilaterally. No pleural effusion   or pneumothorax.    --- End of Report ---            DONY POWERS MD; Attending Radiologist  This document has been electronically signed. Oct 15 2023  8:47AM (10-14-23 @ 19:17)      CT      CARDIOLOGY TESTING  12 Lead ECG:   Ventricular Rate 152 BPM    Atrial Rate 107 BPM    P-R Interval 98 ms    QRS Duration 68 ms    Q-T Interval 320 ms    QTC Calculation(Bazett) 508 ms    P Axis 21 degrees    R Axis 86 degrees    T Axis 29 degrees    Diagnosis Line Normal sinus rhythm  Cannot rule out Anterior infarct , age undetermined  Abnormal ECG  *** Poor data quality, interpretation may be adversely affected  Confirmed by Amol Lopez ([id number]) on 10/15/2023 2:06:47 PM (10-14-23 @ 16:42)      MEDICATIONS  cefepime   IVPB 2000 IV Intermittent every 12 hours  gabapentin 600 Oral daily  heparin   Injectable 5000 SubCutaneous every 12 hours  melatonin 5 Oral at bedtime  midodrine 5 Oral every 8 hours  multivitamin 1 Oral daily  pantoprazole    Tablet 40 Oral two times a day  raloxifene 60 Oral daily  vancomycin  IVPB 750 IV Intermittent every 24 hours      Weight  Weight (kg): 49.9 (10-14-23 @ 16:19)    ANTIBIOTICS:  cefepime   IVPB 2000 milliGRAM(s) IV Intermittent every 12 hours  vancomycin  IVPB 750 milliGRAM(s) IV Intermittent every 24 hours      ALLERGIES:  No Known Allergies

## 2023-10-15 NOTE — PATIENT PROFILE ADULT - IS PATIENT POST-MENOPAUSAL?
Pt intubated, information obtained from group home aid Pt intubated, information obtained from group home aid/yes

## 2023-10-15 NOTE — PATIENT PROFILE ADULT - FALL HARM RISK - HARM RISK INTERVENTIONS
Assistance with ambulation/Assistance OOB with selected safe patient handling equipment/Communicate Risk of Fall with Harm to all staff/Discuss with provider need for PT consult/Monitor gait and stability/Reinforce activity limits and safety measures with patient and family/Tailored Fall Risk Interventions/Visual Cue: Yellow wristband and red socks/Bed in lowest position, wheels locked, appropriate side rails in place/Call bell, personal items and telephone in reach/Instruct patient to call for assistance before getting out of bed or chair/Non-slip footwear when patient is out of bed/[place] to call system/Physically safe environment - no spills, clutter or unnecessary equipment/Purposeful Proactive Rounding/Room/bathroom lighting operational, light cord in reach

## 2023-10-15 NOTE — PROGRESS NOTE ADULT - ASSESSMENT
Patient is a 58 year old female with a PMHx of Down syndrome, nonverbal at baseline, hypothyroidism, cerebral palsy, congenital pulmonary stenosis, Seizures, presents to the ED from nursing facility for syncope associated with tonic-clonic movement witnessed by aide. Initial vitals at nursing home showed bradycardia and hypoxia for about 10-15 minutes. No fevers, chill, cough, vomiting, diarrhea, difficulty breathing.      Seizure Like activity:   Episode of Tonic clonic movement and syncope:   Patient was hypoxic and bradycardic after the episode, in the ED BP 86/55  Neuro exam: patient with contracted LE, moves upper extremities.   Head CT   EEG is negative for seizure activity.   Neurology follow up. Continue Gabapentin.   Seizure precaution     Right planter foot ulcers:   Patient with multiple decubitus ulcers on her right foot, less on left foot.   ESR 67, CRP 16, pending foot x-ray  Seen by podiatry, may need wound debridement.   ID consult, continue Vancomycin, may switch Cefepime to Zosyn (for seizure precaution).     Buttock decubitus ulcer   wound care consult     Recent NSTEMI type II due to shock   Echo results 9/30 EF 55 to 60 %, Normal left systolic function Diastolic function could not be assessed.   Trend trop: 0.34 -> 0.11 -> 0.02 (Today)      Down syndrome   Cerebral palsy  Congential Pulmonary stenosis  - Nonverbal at baseline   - Echo results 9/30 EF 55 to 60 %, Normal left systolic function Diastolic function could not be assessed.     Osteoporosis  Raloxifene     DVT ppx: Heparin  SQ   GI PPX: Protonix 40mg BID   Diet: puree diet with thin liquids  Code status: Full CODE   pending: ID consult, podiatry follow up, monitor for seizure.   Disposition: from group home.

## 2023-10-16 LAB
ALBUMIN SERPL ELPH-MCNC: 3.3 G/DL — LOW (ref 3.5–5.2)
ALP SERPL-CCNC: 105 U/L — SIGNIFICANT CHANGE UP (ref 30–115)
ALT FLD-CCNC: 36 U/L — SIGNIFICANT CHANGE UP (ref 0–41)
ANION GAP SERPL CALC-SCNC: 11 MMOL/L — SIGNIFICANT CHANGE UP (ref 7–14)
AST SERPL-CCNC: 32 U/L — SIGNIFICANT CHANGE UP (ref 0–41)
BASOPHILS # BLD AUTO: 0.06 K/UL — SIGNIFICANT CHANGE UP (ref 0–0.2)
BASOPHILS NFR BLD AUTO: 1.2 % — HIGH (ref 0–1)
BILIRUB SERPL-MCNC: 0.2 MG/DL — SIGNIFICANT CHANGE UP (ref 0.2–1.2)
BUN SERPL-MCNC: 10 MG/DL — SIGNIFICANT CHANGE UP (ref 10–20)
CALCIUM SERPL-MCNC: 8.6 MG/DL — SIGNIFICANT CHANGE UP (ref 8.4–10.5)
CHLORIDE SERPL-SCNC: 105 MMOL/L — SIGNIFICANT CHANGE UP (ref 98–110)
CO2 SERPL-SCNC: 26 MMOL/L — SIGNIFICANT CHANGE UP (ref 17–32)
CREAT SERPL-MCNC: 0.8 MG/DL — SIGNIFICANT CHANGE UP (ref 0.7–1.5)
EGFR: 86 ML/MIN/1.73M2 — SIGNIFICANT CHANGE UP
EOSINOPHIL # BLD AUTO: 0.21 K/UL — SIGNIFICANT CHANGE UP (ref 0–0.7)
EOSINOPHIL NFR BLD AUTO: 4.3 % — SIGNIFICANT CHANGE UP (ref 0–8)
GLUCOSE SERPL-MCNC: 89 MG/DL — SIGNIFICANT CHANGE UP (ref 70–99)
HCT VFR BLD CALC: 37.6 % — SIGNIFICANT CHANGE UP (ref 37–47)
HGB BLD-MCNC: 11.3 G/DL — LOW (ref 12–16)
IMM GRANULOCYTES NFR BLD AUTO: 0.2 % — SIGNIFICANT CHANGE UP (ref 0.1–0.3)
LYMPHOCYTES # BLD AUTO: 1.51 K/UL — SIGNIFICANT CHANGE UP (ref 1.2–3.4)
LYMPHOCYTES # BLD AUTO: 31 % — SIGNIFICANT CHANGE UP (ref 20.5–51.1)
MAGNESIUM SERPL-MCNC: 2.3 MG/DL — SIGNIFICANT CHANGE UP (ref 1.8–2.4)
MCHC RBC-ENTMCNC: 23 PG — LOW (ref 27–31)
MCHC RBC-ENTMCNC: 30.1 G/DL — LOW (ref 32–37)
MCV RBC AUTO: 76.4 FL — LOW (ref 81–99)
MONOCYTES # BLD AUTO: 0.41 K/UL — SIGNIFICANT CHANGE UP (ref 0.1–0.6)
MONOCYTES NFR BLD AUTO: 8.4 % — SIGNIFICANT CHANGE UP (ref 1.7–9.3)
NEUTROPHILS # BLD AUTO: 2.67 K/UL — SIGNIFICANT CHANGE UP (ref 1.4–6.5)
NEUTROPHILS NFR BLD AUTO: 54.9 % — SIGNIFICANT CHANGE UP (ref 42.2–75.2)
NRBC # BLD: 0 /100 WBCS — SIGNIFICANT CHANGE UP (ref 0–0)
PLATELET # BLD AUTO: 363 K/UL — SIGNIFICANT CHANGE UP (ref 130–400)
PMV BLD: 9.9 FL — SIGNIFICANT CHANGE UP (ref 7.4–10.4)
POTASSIUM SERPL-MCNC: 4.1 MMOL/L — SIGNIFICANT CHANGE UP (ref 3.5–5)
POTASSIUM SERPL-SCNC: 4.1 MMOL/L — SIGNIFICANT CHANGE UP (ref 3.5–5)
PROCALCITONIN SERPL-MCNC: 0.07 NG/ML — SIGNIFICANT CHANGE UP (ref 0.02–0.1)
PROT SERPL-MCNC: 6.5 G/DL — SIGNIFICANT CHANGE UP (ref 6–8)
RBC # BLD: 4.92 M/UL — SIGNIFICANT CHANGE UP (ref 4.2–5.4)
RBC # FLD: 25.5 % — HIGH (ref 11.5–14.5)
SODIUM SERPL-SCNC: 142 MMOL/L — SIGNIFICANT CHANGE UP (ref 135–146)
TSH SERPL-MCNC: 2.89 UIU/ML — SIGNIFICANT CHANGE UP (ref 0.27–4.2)
WBC # BLD: 4.87 K/UL — SIGNIFICANT CHANGE UP (ref 4.8–10.8)
WBC # FLD AUTO: 4.87 K/UL — SIGNIFICANT CHANGE UP (ref 4.8–10.8)

## 2023-10-16 PROCEDURE — 99232 SBSQ HOSP IP/OBS MODERATE 35: CPT

## 2023-10-16 PROCEDURE — 73718 MRI LOWER EXTREMITY W/O DYE: CPT | Mod: 26,RT

## 2023-10-16 RX ORDER — CEFAZOLIN SODIUM 1 G
1000 VIAL (EA) INJECTION EVERY 8 HOURS
Refills: 0 | Status: DISCONTINUED | OUTPATIENT
Start: 2023-10-16 | End: 2023-10-16

## 2023-10-16 RX ORDER — CEFAZOLIN SODIUM 1 G
2000 VIAL (EA) INJECTION EVERY 8 HOURS
Refills: 0 | Status: DISCONTINUED | OUTPATIENT
Start: 2023-10-16 | End: 2023-10-17

## 2023-10-16 RX ORDER — CHLORHEXIDINE GLUCONATE 213 G/1000ML
1 SOLUTION TOPICAL
Refills: 0 | Status: DISCONTINUED | OUTPATIENT
Start: 2023-10-16 | End: 2023-11-10

## 2023-10-16 RX ADMIN — HEPARIN SODIUM 5000 UNIT(S): 5000 INJECTION INTRAVENOUS; SUBCUTANEOUS at 06:23

## 2023-10-16 RX ADMIN — Medication 2 MILLIGRAM(S): at 21:03

## 2023-10-16 RX ADMIN — Medication 100 MILLIGRAM(S): at 14:18

## 2023-10-16 RX ADMIN — MIDODRINE HYDROCHLORIDE 5 MILLIGRAM(S): 2.5 TABLET ORAL at 14:18

## 2023-10-16 RX ADMIN — Medication 100 MILLIGRAM(S): at 22:19

## 2023-10-16 RX ADMIN — PANTOPRAZOLE SODIUM 40 MILLIGRAM(S): 20 TABLET, DELAYED RELEASE ORAL at 06:23

## 2023-10-16 RX ADMIN — MIDODRINE HYDROCHLORIDE 5 MILLIGRAM(S): 2.5 TABLET ORAL at 06:22

## 2023-10-16 RX ADMIN — PANTOPRAZOLE SODIUM 40 MILLIGRAM(S): 20 TABLET, DELAYED RELEASE ORAL at 18:11

## 2023-10-16 RX ADMIN — Medication 1 TABLET(S): at 12:31

## 2023-10-16 RX ADMIN — CHLORHEXIDINE GLUCONATE 1 APPLICATION(S): 213 SOLUTION TOPICAL at 14:19

## 2023-10-16 RX ADMIN — Medication 5 MILLIGRAM(S): at 22:19

## 2023-10-16 RX ADMIN — MIDODRINE HYDROCHLORIDE 5 MILLIGRAM(S): 2.5 TABLET ORAL at 22:19

## 2023-10-16 RX ADMIN — Medication 100 MILLIGRAM(S): at 06:22

## 2023-10-16 RX ADMIN — RALOXIFENE HYDROCHLORIDE 60 MILLIGRAM(S): 60 TABLET, COATED ORAL at 12:31

## 2023-10-16 RX ADMIN — HEPARIN SODIUM 5000 UNIT(S): 5000 INJECTION INTRAVENOUS; SUBCUTANEOUS at 18:13

## 2023-10-16 RX ADMIN — GABAPENTIN 600 MILLIGRAM(S): 400 CAPSULE ORAL at 12:31

## 2023-10-16 NOTE — CHART NOTE - NSCHARTNOTEFT_GEN_A_CORE
rEEG 10/15: Abnormal due to moderate generalized slowing    Recommendations:  - Management by primary team.  - Substitute cefazolin for other antibiotic if possible to reduce neurotoxicity since it reduces seizure threshold   - Continue gabapentin 600 mg QD  - If changes in neurological status or any further question, please contact back Neurology    Case discussed with Neurology attending Dr. Diop

## 2023-10-16 NOTE — PROGRESS NOTE ADULT - ASSESSMENT
58 year old female with a PMHx of Down syndrome, nonverbal at baseline, hypothyroidism, cerebral palsy, congenital pulmonary stenosis, Seizures, presents to the ED from nursing facility for syncope associated with tonic-clonic movement witnessed by aide. Initial vitals at nursing home showed bradycardia and hypoxia for about 10-15 minutes. No fevers, chill, cough, vomiting, diarrhea, difficulty breathing.      Seizure Like activity:   Episode of Tonic clonic movement and syncope:   Patient was hypoxic and bradycardic after the episode, in the ED BP 86/55  Neuro exam: patient with contracted LE, moves upper extremities.   Head CT   EEG is negative for seizure activity.   Neurology follow up. Continue Gabapentin.   Seizure precaution     Right planter foot ulcers:   Patient with multiple decubitus ulcers on her right foot, less on left foot.   ESR 67, CRP 16,   Right Foot Xray showed post transmetatarsal amputation of the first ray with periosteal reaction at the amputation stump, suspicious for recurrent osteomyelitis.  There is second toe ulcer with osseous erosion/destruction at the second distal phalangeal tuft, consistent with osteomyelitis. There is dorsal   Seen by podiatry, may need wound debridement.   ID recommended cefazolin,     Buttock decubitus ulcer   wound care consult     Recent NSTEMI type II due to shock   Echo results 9/30 EF 55 to 60 %, Normal left systolic function Diastolic function could not be assessed.   Trend trop: 0.34 -> 0.11 -> 0.02 (Today)      Down syndrome   Cerebral palsy  Congential Pulmonary stenosis  Nonverbal at baseline     Osteoporosis  Raloxifene     DVT ppx: Heparin  SQ   GI PPX: Protonix 40mg BID   Diet: puree diet with thin liquids  Code status: Full CODE   pending: ID consult, podiatry follow up, monitor for seizure.   Disposition: from group home.

## 2023-10-16 NOTE — PROGRESS NOTE ADULT - SUBJECTIVE AND OBJECTIVE BOX
Brief Interval History  Patient is a 58y old Female who presents with a chief complaint of Pre-syncope (15 Oct 2023 15:31)    TEA ECHAVARRIA is admitted with a primary diagnosis of Pre-syncope    Today is Hospital Day 2. Patient required 2L NC due to hypoxia to high 80s last night, however she is now saturating 97% on RA.    Admission HPI  HPI:  Patient is a 58 year old female with a PMHx of Down syndrome, nonverbal at baseline, hypothyroidism, cerebral palsy, congenital pulmonary stenosis, Seizures, presents to the ED from nursing facility for syncope associated with tonic-clonic movement witnessed by aide. Initial vitals at nursing home showed bradycardia and hypoxia for about 10-15 minutes. No fevers, chill, cough, vomiting, diarrhea, difficulty breathing.     Hospital course 9/29/2023 -> 10/13/2023 Patient was admitted  for hemoptysis and duodenal perforation requiring intubation and ICU admission for hypovolemic and septic shock requiring temporary pressor support, which resolved. Patient also had acute hypoxemic respiratory failure likely due to multifocal pneumonia. Patient was successfully extubated and completed a course of antibiotics per ID, further infectious workup was unremarkable. Repeat CTAP with contrast showed no contrast extravasation and previous seen foci of retroperitoneal gas no longer identified. The patient was evaluated by surgery and GI, and no acute intervention recommended. Patient with stable hemoglobin. Hospital course complicated by NELA likely prerenal which resolved and NSTEMI likely type II due to shock (Echo 9/30 EF 55 to 60%, Normal left systolic function Diastolic function could not be assessed) with subsequent downtrending trops (0.34 --> 0.11). Patient's hospital course ICU --> Stepdown --> General medical floor. Patient remains hemodynamically stable on general medical, tolerating pureed feeds, and has bowel movements. Patient was discharged on 10/13/2023       In ED:  - Vital signs: BP: 86/55, HR: 77, RR: 24, SpO2: 98% on 4L NC  - Labs showed troponin: 0,02 ( was 0.34 and then 0,11 2 weeks ago), Creatinine 1.1 ( 0.7 on 10/13)    (14 Oct 2023 20:33)      Code Status  Full Code    Past Medical and Surgical History  Down syndrome    Osteoporosis    Mild anemia    Neuropathy    S/P debridement  of R hip on 3/2/21      Social History  Social History:  Lives at nursing home (14 Oct 2023 20:33)      Allergies  No Known Allergies    Medications  Standing Medications  ceFAZolin   IVPB 1000 milliGRAM(s) IV Intermittent every 8 hours  chlorhexidine 2% Cloths 1 Application(s) Topical <User Schedule>  gabapentin 600 milliGRAM(s) Oral daily  heparin   Injectable 5000 Unit(s) SubCutaneous every 12 hours  melatonin 5 milliGRAM(s) Oral at bedtime  midodrine 5 milliGRAM(s) Oral every 8 hours  multivitamin 1 Tablet(s) Oral daily  pantoprazole    Tablet 40 milliGRAM(s) Oral two times a day  raloxifene 60 milliGRAM(s) Oral daily    PRN Medications    Vitals  T(F): 96.6  HR: 64  BP: 96/56  RR: 18  SpO2: 96%    I&O's    10-15-23 @ 07:01  -  10-16-23 @ 07:00  --------------------------------------------------------  IN: 740 mL / OUT: 950 mL / NET: -210 mL    10-16-23 @ 07:01  -  10-16-23 @ 11:37  --------------------------------------------------------  IN: 120 mL / OUT: 0 mL / NET: 120 mL        Review of Systems  Unable to obtain as patient is nonverbal    Physical Examination  General: NAD, nonverbal  Head: NCAT  Neck: Supple, no JVD  Cardiac: Regular rate and rhthym. No murmurs, gallops, or rubs  Pulmonary: CTAB  Abdominal: Soft, nontender, and nondistended with positive bowel sounds  Extremities: right plantar aspect ulcer with mild purulent discharge, multiple pressure ulcers on the toes tips.   Neurologic: Contracted b/l LE  Skin: No rashes or lesions    Tubes, Lines, and Drains    Labs                        11.3   4.87  )-----------( 363      ( 16 Oct 2023 05:45 )             37.6     10-16    142  |  105  |  10  ----------------------------<  89  4.1   |  26  |  0.8    Ca    8.6      16 Oct 2023 05:45  Mg     2.3     10-16    TPro  6.5  /  Alb  3.3<L>  /  TBili  0.2  /  DBili  x   /  AST  32  /  ALT  36  /  AlkPhos  105  10-16      Urinalysis Basic - ( 16 Oct 2023 05:45 )    Color: x / Appearance: x / SG: x / pH: x  Gluc: 89 mg/dL / Ketone: x  / Bili: x / Urobili: x   Blood: x / Protein: x / Nitrite: x   Leuk Esterase: x / RBC: x / WBC x   Sq Epi: x / Non Sq Epi: x / Bacteria: x            CARDIAC MARKERS ( 14 Oct 2023 17:18 )  x     / 0.02 ng/mL / x     / x     / x            Radiology  CXR  Xray Chest 1 View- PORTABLE-Urgent:   ACC: [id number] EXAM:  XR CHEST PORTABLE URGENT 1V   ORDERED BY: LESVIA MELÉNDEZ     PROCEDURE DATE:  10/14/2023          INTERPRETATION:  CLINICAL HISTORY: syncope.    COMPARISON: 10/9/2023.    TECHNIQUE: Portable frontal chest radiograph. Adequatepositioning.    FINDINGS:    Support devices: None.    Cardiac/mediastinum/hilum: Unremarkable.    Lung parenchyma/Pleura: Stable lower lobe interstitial opacities   bilaterally. No pleural effusion or pneumothorax.    Skeleton/soft tissues: Stable.      IMPRESSION:    Stable lower lobe interstitial opacities bilaterally. No pleural effusion   or pneumothorax.    --- End of Report ---            DONY POWERS MD; Attending Radiologist  This document has been electronically signed. Oct 15 2023  8:47AM (10-14-23 @ 19:17)      CT

## 2023-10-16 NOTE — PROGRESS NOTE ADULT - ASSESSMENT
Patient is a 58 year old female with a PMHx of Down syndrome, nonverbal at baseline, hypothyroidism, cerebral palsy, congenital pulmonary stenosis, Seizures, presents to the ED from nursing facility for syncope associated with tonic-clonic movement witnessed by aide. Initial vitals at nursing home showed bradycardia and hypoxia for about 10-15 minutes. No fevers, chill, cough, vomiting, diarrhea, difficulty breathing.      #Seizure Like Activity  #Episode of Tonic Clonic Movement and Syncope  - Patient was hypoxic and bradycardic after the episode, in the ED BP 86/55  - Head CT negative for acute pathology  - rEEG shows generalized slowing, no seizure activity  -Neurology follow up. Continue Gabapentin.   Seizure precaution     Right planter foot ulcers:   Patient with multiple decubitus ulcers on her right foot, less on left foot.   ESR 67, CRP 16, pending foot x-ray  Seen by podiatry, may need wound debridement.   ID consult, continue Vancomycin, may switch Cefepime to Zosyn (for seizure precaution).     Buttock decubitus ulcer   wound care consult     Recent NSTEMI type II due to shock   Echo results 9/30 EF 55 to 60 %, Normal left systolic function Diastolic function could not be assessed.   Trend trop: 0.34 -> 0.11 -> 0.02 (Today)      Down syndrome   Cerebral palsy  Congential Pulmonary stenosis  - Nonverbal at baseline   - Echo results 9/30 EF 55 to 60 %, Normal left systolic function Diastolic function could not be assessed.     Osteoporosis  Raloxifene     DVT ppx: Heparin  SQ   GI PPX: Protonix 40mg BID   Diet: puree diet with thin liquids  Code status: Full CODE   pending: ID consult, podiatry follow up, monitor for seizure.   Disposition: from group home.    Patient is a 58 year old female with a PMHx of Down syndrome, nonverbal at baseline, hypothyroidism, cerebral palsy, congenital pulmonary stenosis, Seizures, presents to the ED from nursing facility for syncope associated with tonic-clonic movement witnessed by aide. Initial vitals at nursing home showed bradycardia and hypoxia for about 10-15 minutes. No fevers, chill, cough, vomiting, diarrhea, difficulty breathing.      #Seizure Like Activity  #Episode of Tonic Clonic Movement and Syncope  - Patient was hypoxic and bradycardic after the episode, in the ED BP 86/55  - Head CT negative for acute pathology  - rEEG shows generalized slowing, no seizure activity  - Now off tele  - Gabapentin 600mg PO QD  - F/u neurology    #Right Plantar Foot Ulcers  - Multiple decubitus ulcers on R foot  - ESR 67, CRP 16  - XR of R foot performed, awaiting read  - Podiatry f/u for possible debridement  - Cefazolin 1g q8 per ID    #Decubitus Ulcer on Buttocks   - Wound care consult     #Recent Type II NSTEMI due to shock   - Echo 9/30: EF 55 to 60%, Normal left systolic function, Diastolic function could not be assessed.   - Troponin: 0.34 -> 0.11 -> 0.02 10/14  - Now off tele    #Down Syndrome   #Cerebral Palsy  #Congential Pulmonary stenosis  - Nonverbal at baseline   - Echo results 9/30 EF 55 to 60 %, Normal left systolic function Diastolic function could not be assessed.     #Osteoporosis  - C/w Raloxifene     #Misc  #DVT prophylaxis: Heparin  SQ   #GI prophylaxis: Protonix 40mg BID   #Diet: Puree with thin liquids  #Code Status: Full   #Disposition: Group home.   #Pending: Foot XR read, Podiatry f/u, Neuro f/u

## 2023-10-16 NOTE — PROGRESS NOTE ADULT - SUBJECTIVE AND OBJECTIVE BOX
TEA ECHAVARRIA  58y  Female      Patient is a 58y old  Female who presents with a chief complaint of Pre-syncope (16 Oct 2023 11:37)      INTERVAL HPI/OVERNIGHT EVENTS:  She is non-verbal, no fever, looks comfortable.   Vital Signs Last 24 Hrs  T(C): 36.4 (16 Oct 2023 13:50), Max: 36.8 (15 Oct 2023 21:10)  T(F): 97.6 (16 Oct 2023 13:50), Max: 98.3 (15 Oct 2023 21:10)  HR: 78 (16 Oct 2023 13:50) (64 - 98)  BP: 93/55 (16 Oct 2023 13:50) (93/55 - 126/55)  BP(mean): --  RR: 18 (16 Oct 2023 13:50) (18 - 18)  SpO2: 96% (16 Oct 2023 09:33) (90% - 97%)    Parameters below as of 16 Oct 2023 09:33  Patient On (Oxygen Delivery Method): room air          10-15-23 @ 07:01  -  10-16-23 @ 07:00  --------------------------------------------------------  IN: 740 mL / OUT: 950 mL / NET: -210 mL    10-16-23 @ 07:01  -  10-16-23 @ 15:47  --------------------------------------------------------  IN: 240 mL / OUT: 680 mL / NET: -440 mL            Consultant(s) Notes Reviewed:  [x ] YES  [ ] NO          MEDICATIONS  (STANDING):  ceFAZolin   IVPB 2000 milliGRAM(s) IV Intermittent every 8 hours  chlorhexidine 2% Cloths 1 Application(s) Topical <User Schedule>  gabapentin 600 milliGRAM(s) Oral daily  heparin   Injectable 5000 Unit(s) SubCutaneous every 12 hours  melatonin 5 milliGRAM(s) Oral at bedtime  midodrine 5 milliGRAM(s) Oral every 8 hours  multivitamin 1 Tablet(s) Oral daily  pantoprazole    Tablet 40 milliGRAM(s) Oral two times a day  raloxifene 60 milliGRAM(s) Oral daily    MEDICATIONS  (PRN):  LORazepam   Injectable 2 milliGRAM(s) IV Push once PRN for MRI      LABS                          11.3   4.87  )-----------( 363      ( 16 Oct 2023 05:45 )             37.6     10-16    142  |  105  |  10  ----------------------------<  89  4.1   |  26  |  0.8    Ca    8.6      16 Oct 2023 05:45  Mg     2.3     10-16    TPro  6.5  /  Alb  3.3<L>  /  TBili  0.2  /  DBili  x   /  AST  32  /  ALT  36  /  AlkPhos  105  10-16      Urinalysis Basic - ( 16 Oct 2023 05:45 )    Color: x / Appearance: x / SG: x / pH: x  Gluc: 89 mg/dL / Ketone: x  / Bili: x / Urobili: x   Blood: x / Protein: x / Nitrite: x   Leuk Esterase: x / RBC: x / WBC x   Sq Epi: x / Non Sq Epi: x / Bacteria: x        Lactate Trend    CARDIAC MARKERS ( 14 Oct 2023 17:18 )  x     / 0.02 ng/mL / x     / x     / x          CAPILLARY BLOOD GLUCOSE            RADIOLOGY & ADDITIONAL TESTS:    Imaging Personally Reviewed:  [ ] YES  [ ] NO    HEALTH ISSUES - PROBLEM Dx:          PHYSICAL EXAM:  GENERAL: down syndrome feature, stable, awake.   HEAD:  Atraumatic, Normocephalic.  EYES: EOMI, PERRLA, conjunctiva and sclera clear.  NECK: Supple, No JVD.  CHEST/LUNG: Clear to auscultation bilaterally; No wheeze.  HEART: Regular rate and rhythm; S1 S2.   ABDOMEN: Soft, Nontender, Nondistended; Bowel sounds present.  EXTREMITIES:  right plantar aspect ulcer with mild purulent discharge, multiple pressure ulcers on the toes tips.   PSYCH:  non-verbal.  NEUROLOGY: lower extremities are contracted,   SKIN: decubitus ulcer stage II

## 2023-10-16 NOTE — CHART NOTE - NSCHARTNOTEFT_GEN_A_CORE
Appreciate neurology note stating that cefazolin should be avoided due to potential neurotoxicity, however, per ID, there is no alternative for cefazolin at this time.

## 2023-10-17 ENCOUNTER — APPOINTMENT (OUTPATIENT)
Dept: CARDIOLOGY | Facility: CLINIC | Age: 58
End: 2023-10-17

## 2023-10-17 LAB
ALBUMIN SERPL ELPH-MCNC: 3 G/DL — LOW (ref 3.5–5.2)
ALBUMIN SERPL ELPH-MCNC: 3 G/DL — LOW (ref 3.5–5.2)
ALBUMIN SERPL ELPH-MCNC: 3.3 G/DL — LOW (ref 3.5–5.2)
ALBUMIN SERPL ELPH-MCNC: 3.3 G/DL — LOW (ref 3.5–5.2)
ALP SERPL-CCNC: 108 U/L — SIGNIFICANT CHANGE UP (ref 30–115)
ALP SERPL-CCNC: 108 U/L — SIGNIFICANT CHANGE UP (ref 30–115)
ALP SERPL-CCNC: 118 U/L — HIGH (ref 30–115)
ALP SERPL-CCNC: 118 U/L — HIGH (ref 30–115)
ALT FLD-CCNC: 28 U/L — SIGNIFICANT CHANGE UP (ref 0–41)
ALT FLD-CCNC: 28 U/L — SIGNIFICANT CHANGE UP (ref 0–41)
ALT FLD-CCNC: 29 U/L — SIGNIFICANT CHANGE UP (ref 0–41)
ALT FLD-CCNC: 29 U/L — SIGNIFICANT CHANGE UP (ref 0–41)
ANION GAP SERPL CALC-SCNC: 13 MMOL/L — SIGNIFICANT CHANGE UP (ref 7–14)
ANION GAP SERPL CALC-SCNC: 8 MMOL/L — SIGNIFICANT CHANGE UP (ref 7–14)
ANION GAP SERPL CALC-SCNC: 8 MMOL/L — SIGNIFICANT CHANGE UP (ref 7–14)
APPEARANCE UR: CLEAR — SIGNIFICANT CHANGE UP
APPEARANCE UR: CLEAR — SIGNIFICANT CHANGE UP
APTT BLD: 22.9 SEC — CRITICAL LOW (ref 27–39.2)
APTT BLD: 22.9 SEC — CRITICAL LOW (ref 27–39.2)
AST SERPL-CCNC: 29 U/L — SIGNIFICANT CHANGE UP (ref 0–41)
AST SERPL-CCNC: 29 U/L — SIGNIFICANT CHANGE UP (ref 0–41)
AST SERPL-CCNC: 31 U/L — SIGNIFICANT CHANGE UP (ref 0–41)
AST SERPL-CCNC: 31 U/L — SIGNIFICANT CHANGE UP (ref 0–41)
BASOPHILS # BLD AUTO: 0.08 K/UL — SIGNIFICANT CHANGE UP (ref 0–0.2)
BASOPHILS # BLD AUTO: 0.08 K/UL — SIGNIFICANT CHANGE UP (ref 0–0.2)
BASOPHILS # BLD AUTO: 0.09 K/UL — SIGNIFICANT CHANGE UP (ref 0–0.2)
BASOPHILS # BLD AUTO: 0.09 K/UL — SIGNIFICANT CHANGE UP (ref 0–0.2)
BASOPHILS NFR BLD AUTO: 0.4 % — SIGNIFICANT CHANGE UP (ref 0–1)
BILIRUB SERPL-MCNC: 0.6 MG/DL — SIGNIFICANT CHANGE UP (ref 0.2–1.2)
BILIRUB SERPL-MCNC: 0.6 MG/DL — SIGNIFICANT CHANGE UP (ref 0.2–1.2)
BILIRUB SERPL-MCNC: 0.7 MG/DL — SIGNIFICANT CHANGE UP (ref 0.2–1.2)
BILIRUB SERPL-MCNC: 0.7 MG/DL — SIGNIFICANT CHANGE UP (ref 0.2–1.2)
BILIRUB UR-MCNC: NEGATIVE — SIGNIFICANT CHANGE UP
BILIRUB UR-MCNC: NEGATIVE — SIGNIFICANT CHANGE UP
BUN SERPL-MCNC: 16 MG/DL — SIGNIFICANT CHANGE UP (ref 10–20)
BUN SERPL-MCNC: 16 MG/DL — SIGNIFICANT CHANGE UP (ref 10–20)
BUN SERPL-MCNC: 20 MG/DL — SIGNIFICANT CHANGE UP (ref 10–20)
BUN SERPL-MCNC: 20 MG/DL — SIGNIFICANT CHANGE UP (ref 10–20)
BUN SERPL-MCNC: 21 MG/DL — HIGH (ref 10–20)
BUN SERPL-MCNC: 21 MG/DL — HIGH (ref 10–20)
CALCIUM SERPL-MCNC: 8.6 MG/DL — SIGNIFICANT CHANGE UP (ref 8.4–10.4)
CALCIUM SERPL-MCNC: 8.6 MG/DL — SIGNIFICANT CHANGE UP (ref 8.4–10.4)
CALCIUM SERPL-MCNC: 8.7 MG/DL — SIGNIFICANT CHANGE UP (ref 8.4–10.5)
CALCIUM SERPL-MCNC: 8.7 MG/DL — SIGNIFICANT CHANGE UP (ref 8.4–10.5)
CALCIUM SERPL-MCNC: 8.8 MG/DL — SIGNIFICANT CHANGE UP (ref 8.4–10.5)
CALCIUM SERPL-MCNC: 8.8 MG/DL — SIGNIFICANT CHANGE UP (ref 8.4–10.5)
CHLORIDE SERPL-SCNC: 102 MMOL/L — SIGNIFICANT CHANGE UP (ref 98–110)
CHLORIDE SERPL-SCNC: 102 MMOL/L — SIGNIFICANT CHANGE UP (ref 98–110)
CHLORIDE SERPL-SCNC: 105 MMOL/L — SIGNIFICANT CHANGE UP (ref 98–110)
CHLORIDE SERPL-SCNC: 105 MMOL/L — SIGNIFICANT CHANGE UP (ref 98–110)
CHLORIDE SERPL-SCNC: 108 MMOL/L — SIGNIFICANT CHANGE UP (ref 98–110)
CHLORIDE SERPL-SCNC: 108 MMOL/L — SIGNIFICANT CHANGE UP (ref 98–110)
CK SERPL-CCNC: 31 U/L — SIGNIFICANT CHANGE UP (ref 0–225)
CK SERPL-CCNC: 31 U/L — SIGNIFICANT CHANGE UP (ref 0–225)
CO2 SERPL-SCNC: 22 MMOL/L — SIGNIFICANT CHANGE UP (ref 17–32)
CO2 SERPL-SCNC: 22 MMOL/L — SIGNIFICANT CHANGE UP (ref 17–32)
CO2 SERPL-SCNC: 24 MMOL/L — SIGNIFICANT CHANGE UP (ref 17–32)
CO2 SERPL-SCNC: 24 MMOL/L — SIGNIFICANT CHANGE UP (ref 17–32)
CO2 SERPL-SCNC: 26 MMOL/L — SIGNIFICANT CHANGE UP (ref 17–32)
CO2 SERPL-SCNC: 26 MMOL/L — SIGNIFICANT CHANGE UP (ref 17–32)
COLOR SPEC: YELLOW — SIGNIFICANT CHANGE UP
COLOR SPEC: YELLOW — SIGNIFICANT CHANGE UP
CREAT SERPL-MCNC: 0.8 MG/DL — SIGNIFICANT CHANGE UP (ref 0.7–1.5)
CREAT SERPL-MCNC: 0.8 MG/DL — SIGNIFICANT CHANGE UP (ref 0.7–1.5)
CREAT SERPL-MCNC: 0.9 MG/DL — SIGNIFICANT CHANGE UP (ref 0.7–1.5)
CREAT SERPL-MCNC: 0.9 MG/DL — SIGNIFICANT CHANGE UP (ref 0.7–1.5)
CREAT SERPL-MCNC: 1 MG/DL — SIGNIFICANT CHANGE UP (ref 0.7–1.5)
CREAT SERPL-MCNC: 1 MG/DL — SIGNIFICANT CHANGE UP (ref 0.7–1.5)
D DIMER BLD IA.RAPID-MCNC: 1572 NG/ML DDU — HIGH
D DIMER BLD IA.RAPID-MCNC: 1572 NG/ML DDU — HIGH
DIFF PNL FLD: NEGATIVE — SIGNIFICANT CHANGE UP
DIFF PNL FLD: NEGATIVE — SIGNIFICANT CHANGE UP
EGFR: 66 ML/MIN/1.73M2 — SIGNIFICANT CHANGE UP
EGFR: 66 ML/MIN/1.73M2 — SIGNIFICANT CHANGE UP
EGFR: 75 ML/MIN/1.73M2 — SIGNIFICANT CHANGE UP
EGFR: 75 ML/MIN/1.73M2 — SIGNIFICANT CHANGE UP
EGFR: 86 ML/MIN/1.73M2 — SIGNIFICANT CHANGE UP
EGFR: 86 ML/MIN/1.73M2 — SIGNIFICANT CHANGE UP
EOSINOPHIL # BLD AUTO: 0.1 K/UL — SIGNIFICANT CHANGE UP (ref 0–0.7)
EOSINOPHIL # BLD AUTO: 0.1 K/UL — SIGNIFICANT CHANGE UP (ref 0–0.7)
EOSINOPHIL # BLD AUTO: 0.19 K/UL — SIGNIFICANT CHANGE UP (ref 0–0.7)
EOSINOPHIL # BLD AUTO: 0.19 K/UL — SIGNIFICANT CHANGE UP (ref 0–0.7)
EOSINOPHIL NFR BLD AUTO: 0.4 % — SIGNIFICANT CHANGE UP (ref 0–8)
EOSINOPHIL NFR BLD AUTO: 0.4 % — SIGNIFICANT CHANGE UP (ref 0–8)
EOSINOPHIL NFR BLD AUTO: 0.9 % — SIGNIFICANT CHANGE UP (ref 0–8)
EOSINOPHIL NFR BLD AUTO: 0.9 % — SIGNIFICANT CHANGE UP (ref 0–8)
GAS PNL BLDA: SIGNIFICANT CHANGE UP
GLUCOSE SERPL-MCNC: 157 MG/DL — HIGH (ref 70–99)
GLUCOSE SERPL-MCNC: 157 MG/DL — HIGH (ref 70–99)
GLUCOSE SERPL-MCNC: 166 MG/DL — HIGH (ref 70–99)
GLUCOSE SERPL-MCNC: 166 MG/DL — HIGH (ref 70–99)
GLUCOSE SERPL-MCNC: 167 MG/DL — HIGH (ref 70–99)
GLUCOSE SERPL-MCNC: 167 MG/DL — HIGH (ref 70–99)
GLUCOSE UR QL: NEGATIVE MG/DL — SIGNIFICANT CHANGE UP
GLUCOSE UR QL: NEGATIVE MG/DL — SIGNIFICANT CHANGE UP
HCT VFR BLD CALC: 36.7 % — LOW (ref 37–47)
HCT VFR BLD CALC: 36.7 % — LOW (ref 37–47)
HCT VFR BLD CALC: 38.1 % — SIGNIFICANT CHANGE UP (ref 37–47)
HCT VFR BLD CALC: 38.1 % — SIGNIFICANT CHANGE UP (ref 37–47)
HGB BLD-MCNC: 11.3 G/DL — LOW (ref 12–16)
HGB BLD-MCNC: 11.3 G/DL — LOW (ref 12–16)
HGB BLD-MCNC: 11.6 G/DL — LOW (ref 12–16)
HGB BLD-MCNC: 11.6 G/DL — LOW (ref 12–16)
IMM GRANULOCYTES NFR BLD AUTO: 0.6 % — HIGH (ref 0.1–0.3)
IMM GRANULOCYTES NFR BLD AUTO: 0.6 % — HIGH (ref 0.1–0.3)
IMM GRANULOCYTES NFR BLD AUTO: 0.8 % — HIGH (ref 0.1–0.3)
IMM GRANULOCYTES NFR BLD AUTO: 0.8 % — HIGH (ref 0.1–0.3)
INR BLD: 1.09 RATIO — SIGNIFICANT CHANGE UP (ref 0.65–1.3)
INR BLD: 1.09 RATIO — SIGNIFICANT CHANGE UP (ref 0.65–1.3)
KETONES UR-MCNC: ABNORMAL MG/DL
KETONES UR-MCNC: ABNORMAL MG/DL
LEUKOCYTE ESTERASE UR-ACNC: NEGATIVE — SIGNIFICANT CHANGE UP
LEUKOCYTE ESTERASE UR-ACNC: NEGATIVE — SIGNIFICANT CHANGE UP
LYMPHOCYTES # BLD AUTO: 1.75 K/UL — SIGNIFICANT CHANGE UP (ref 1.2–3.4)
LYMPHOCYTES # BLD AUTO: 1.75 K/UL — SIGNIFICANT CHANGE UP (ref 1.2–3.4)
LYMPHOCYTES # BLD AUTO: 2.48 K/UL — SIGNIFICANT CHANGE UP (ref 1.2–3.4)
LYMPHOCYTES # BLD AUTO: 2.48 K/UL — SIGNIFICANT CHANGE UP (ref 1.2–3.4)
LYMPHOCYTES # BLD AUTO: 7.8 % — LOW (ref 20.5–51.1)
LYMPHOCYTES # BLD AUTO: 7.8 % — LOW (ref 20.5–51.1)
LYMPHOCYTES # BLD AUTO: 9.8 % — LOW (ref 20.5–51.1)
LYMPHOCYTES # BLD AUTO: 9.8 % — LOW (ref 20.5–51.1)
MAGNESIUM SERPL-MCNC: 1.8 MG/DL — SIGNIFICANT CHANGE UP (ref 1.8–2.4)
MAGNESIUM SERPL-MCNC: 1.8 MG/DL — SIGNIFICANT CHANGE UP (ref 1.8–2.4)
MAGNESIUM SERPL-MCNC: 2 MG/DL — SIGNIFICANT CHANGE UP (ref 1.8–2.4)
MAGNESIUM SERPL-MCNC: 2 MG/DL — SIGNIFICANT CHANGE UP (ref 1.8–2.4)
MCHC RBC-ENTMCNC: 23.5 PG — LOW (ref 27–31)
MCHC RBC-ENTMCNC: 23.5 PG — LOW (ref 27–31)
MCHC RBC-ENTMCNC: 23.6 PG — LOW (ref 27–31)
MCHC RBC-ENTMCNC: 23.6 PG — LOW (ref 27–31)
MCHC RBC-ENTMCNC: 30.4 G/DL — LOW (ref 32–37)
MCHC RBC-ENTMCNC: 30.4 G/DL — LOW (ref 32–37)
MCHC RBC-ENTMCNC: 30.8 G/DL — LOW (ref 32–37)
MCHC RBC-ENTMCNC: 30.8 G/DL — LOW (ref 32–37)
MCV RBC AUTO: 76.8 FL — LOW (ref 81–99)
MCV RBC AUTO: 76.8 FL — LOW (ref 81–99)
MCV RBC AUTO: 77.3 FL — LOW (ref 81–99)
MCV RBC AUTO: 77.3 FL — LOW (ref 81–99)
MONOCYTES # BLD AUTO: 0.63 K/UL — HIGH (ref 0.1–0.6)
MONOCYTES # BLD AUTO: 0.63 K/UL — HIGH (ref 0.1–0.6)
MONOCYTES # BLD AUTO: 0.8 K/UL — HIGH (ref 0.1–0.6)
MONOCYTES # BLD AUTO: 0.8 K/UL — HIGH (ref 0.1–0.6)
MONOCYTES NFR BLD AUTO: 2.8 % — SIGNIFICANT CHANGE UP (ref 1.7–9.3)
MONOCYTES NFR BLD AUTO: 2.8 % — SIGNIFICANT CHANGE UP (ref 1.7–9.3)
MONOCYTES NFR BLD AUTO: 3.2 % — SIGNIFICANT CHANGE UP (ref 1.7–9.3)
MONOCYTES NFR BLD AUTO: 3.2 % — SIGNIFICANT CHANGE UP (ref 1.7–9.3)
MRSA PCR RESULT.: NEGATIVE — SIGNIFICANT CHANGE UP
MRSA PCR RESULT.: NEGATIVE — SIGNIFICANT CHANGE UP
NEUTROPHILS # BLD AUTO: 19.52 K/UL — HIGH (ref 1.4–6.5)
NEUTROPHILS # BLD AUTO: 19.52 K/UL — HIGH (ref 1.4–6.5)
NEUTROPHILS # BLD AUTO: 21.61 K/UL — HIGH (ref 1.4–6.5)
NEUTROPHILS # BLD AUTO: 21.61 K/UL — HIGH (ref 1.4–6.5)
NEUTROPHILS NFR BLD AUTO: 85.4 % — HIGH (ref 42.2–75.2)
NEUTROPHILS NFR BLD AUTO: 85.4 % — HIGH (ref 42.2–75.2)
NEUTROPHILS NFR BLD AUTO: 87.5 % — HIGH (ref 42.2–75.2)
NEUTROPHILS NFR BLD AUTO: 87.5 % — HIGH (ref 42.2–75.2)
NITRITE UR-MCNC: NEGATIVE — SIGNIFICANT CHANGE UP
NITRITE UR-MCNC: NEGATIVE — SIGNIFICANT CHANGE UP
NRBC # BLD: 0 /100 WBCS — SIGNIFICANT CHANGE UP (ref 0–0)
PH UR: 6 — SIGNIFICANT CHANGE UP (ref 5–8)
PH UR: 6 — SIGNIFICANT CHANGE UP (ref 5–8)
PHOSPHATE SERPL-MCNC: 3.1 MG/DL — SIGNIFICANT CHANGE UP (ref 2.1–4.9)
PHOSPHATE SERPL-MCNC: 3.1 MG/DL — SIGNIFICANT CHANGE UP (ref 2.1–4.9)
PLATELET # BLD AUTO: 356 K/UL — SIGNIFICANT CHANGE UP (ref 130–400)
PLATELET # BLD AUTO: 356 K/UL — SIGNIFICANT CHANGE UP (ref 130–400)
PLATELET # BLD AUTO: 362 K/UL — SIGNIFICANT CHANGE UP (ref 130–400)
PLATELET # BLD AUTO: 362 K/UL — SIGNIFICANT CHANGE UP (ref 130–400)
PMV BLD: 10.2 FL — SIGNIFICANT CHANGE UP (ref 7.4–10.4)
PMV BLD: 10.2 FL — SIGNIFICANT CHANGE UP (ref 7.4–10.4)
PMV BLD: 9.9 FL — SIGNIFICANT CHANGE UP (ref 7.4–10.4)
PMV BLD: 9.9 FL — SIGNIFICANT CHANGE UP (ref 7.4–10.4)
POTASSIUM SERPL-MCNC: 3.8 MMOL/L — SIGNIFICANT CHANGE UP (ref 3.5–5)
POTASSIUM SERPL-MCNC: 3.8 MMOL/L — SIGNIFICANT CHANGE UP (ref 3.5–5)
POTASSIUM SERPL-MCNC: 4 MMOL/L — SIGNIFICANT CHANGE UP (ref 3.5–5)
POTASSIUM SERPL-MCNC: 4 MMOL/L — SIGNIFICANT CHANGE UP (ref 3.5–5)
POTASSIUM SERPL-MCNC: 4.4 MMOL/L — SIGNIFICANT CHANGE UP (ref 3.5–5)
POTASSIUM SERPL-MCNC: 4.4 MMOL/L — SIGNIFICANT CHANGE UP (ref 3.5–5)
POTASSIUM SERPL-SCNC: 3.8 MMOL/L — SIGNIFICANT CHANGE UP (ref 3.5–5)
POTASSIUM SERPL-SCNC: 3.8 MMOL/L — SIGNIFICANT CHANGE UP (ref 3.5–5)
POTASSIUM SERPL-SCNC: 4 MMOL/L — SIGNIFICANT CHANGE UP (ref 3.5–5)
POTASSIUM SERPL-SCNC: 4 MMOL/L — SIGNIFICANT CHANGE UP (ref 3.5–5)
POTASSIUM SERPL-SCNC: 4.4 MMOL/L — SIGNIFICANT CHANGE UP (ref 3.5–5)
POTASSIUM SERPL-SCNC: 4.4 MMOL/L — SIGNIFICANT CHANGE UP (ref 3.5–5)
PROT SERPL-MCNC: 6.5 G/DL — SIGNIFICANT CHANGE UP (ref 6–8)
PROT UR-MCNC: SIGNIFICANT CHANGE UP MG/DL
PROT UR-MCNC: SIGNIFICANT CHANGE UP MG/DL
PROTHROM AB SERPL-ACNC: 12.5 SEC — SIGNIFICANT CHANGE UP (ref 9.95–12.87)
PROTHROM AB SERPL-ACNC: 12.5 SEC — SIGNIFICANT CHANGE UP (ref 9.95–12.87)
RBC # BLD: 4.78 M/UL — SIGNIFICANT CHANGE UP (ref 4.2–5.4)
RBC # BLD: 4.78 M/UL — SIGNIFICANT CHANGE UP (ref 4.2–5.4)
RBC # BLD: 4.93 M/UL — SIGNIFICANT CHANGE UP (ref 4.2–5.4)
RBC # BLD: 4.93 M/UL — SIGNIFICANT CHANGE UP (ref 4.2–5.4)
RBC # FLD: 25.9 % — HIGH (ref 11.5–14.5)
RBC # FLD: 25.9 % — HIGH (ref 11.5–14.5)
RBC # FLD: 26 % — HIGH (ref 11.5–14.5)
RBC # FLD: 26 % — HIGH (ref 11.5–14.5)
SODIUM SERPL-SCNC: 137 MMOL/L — SIGNIFICANT CHANGE UP (ref 135–146)
SODIUM SERPL-SCNC: 137 MMOL/L — SIGNIFICANT CHANGE UP (ref 135–146)
SODIUM SERPL-SCNC: 142 MMOL/L — SIGNIFICANT CHANGE UP (ref 135–146)
SP GR SPEC: 1.03 — SIGNIFICANT CHANGE UP (ref 1–1.03)
SP GR SPEC: 1.03 — SIGNIFICANT CHANGE UP (ref 1–1.03)
TROPONIN T SERPL-MCNC: 0.01 NG/ML — SIGNIFICANT CHANGE UP
TROPONIN T SERPL-MCNC: 0.01 NG/ML — SIGNIFICANT CHANGE UP
UROBILINOGEN FLD QL: 0.2 MG/DL — SIGNIFICANT CHANGE UP (ref 0.2–1)
UROBILINOGEN FLD QL: 0.2 MG/DL — SIGNIFICANT CHANGE UP (ref 0.2–1)
WBC # BLD: 22.3 K/UL — HIGH (ref 4.8–10.8)
WBC # BLD: 22.3 K/UL — HIGH (ref 4.8–10.8)
WBC # BLD: 25.27 K/UL — HIGH (ref 4.8–10.8)
WBC # BLD: 25.27 K/UL — HIGH (ref 4.8–10.8)
WBC # FLD AUTO: 22.3 K/UL — HIGH (ref 4.8–10.8)
WBC # FLD AUTO: 22.3 K/UL — HIGH (ref 4.8–10.8)
WBC # FLD AUTO: 25.27 K/UL — HIGH (ref 4.8–10.8)
WBC # FLD AUTO: 25.27 K/UL — HIGH (ref 4.8–10.8)

## 2023-10-17 PROCEDURE — 93970 EXTREMITY STUDY: CPT | Mod: 26

## 2023-10-17 PROCEDURE — 71045 X-RAY EXAM CHEST 1 VIEW: CPT | Mod: 26,77

## 2023-10-17 PROCEDURE — 99291 CRITICAL CARE FIRST HOUR: CPT

## 2023-10-17 PROCEDURE — 71045 X-RAY EXAM CHEST 1 VIEW: CPT | Mod: 26

## 2023-10-17 PROCEDURE — 99233 SBSQ HOSP IP/OBS HIGH 50: CPT

## 2023-10-17 RX ORDER — PIPERACILLIN AND TAZOBACTAM 4; .5 G/20ML; G/20ML
3.38 INJECTION, POWDER, LYOPHILIZED, FOR SOLUTION INTRAVENOUS EVERY 8 HOURS
Refills: 0 | Status: DISCONTINUED | OUTPATIENT
Start: 2023-10-17 | End: 2023-10-17

## 2023-10-17 RX ORDER — VANCOMYCIN HCL 1 G
750 VIAL (EA) INTRAVENOUS ONCE
Refills: 0 | Status: COMPLETED | OUTPATIENT
Start: 2023-10-17 | End: 2023-10-17

## 2023-10-17 RX ORDER — ENOXAPARIN SODIUM 100 MG/ML
50 INJECTION SUBCUTANEOUS EVERY 12 HOURS
Refills: 0 | Status: DISCONTINUED | OUTPATIENT
Start: 2023-10-17 | End: 2023-10-17

## 2023-10-17 RX ORDER — PROPOFOL 10 MG/ML
5 INJECTION, EMULSION INTRAVENOUS ONCE
Refills: 0 | Status: COMPLETED | OUTPATIENT
Start: 2023-10-17 | End: 2023-10-17

## 2023-10-17 RX ORDER — DEXMEDETOMIDINE HYDROCHLORIDE IN 0.9% SODIUM CHLORIDE 4 UG/ML
0.5 INJECTION INTRAVENOUS
Qty: 200 | Refills: 0 | Status: DISCONTINUED | OUTPATIENT
Start: 2023-10-17 | End: 2023-10-21

## 2023-10-17 RX ORDER — MEROPENEM 1 G/30ML
1000 INJECTION INTRAVENOUS EVERY 8 HOURS
Refills: 0 | Status: DISCONTINUED | OUTPATIENT
Start: 2023-10-17 | End: 2023-10-17

## 2023-10-17 RX ORDER — SODIUM CHLORIDE 9 MG/ML
1000 INJECTION INTRAMUSCULAR; INTRAVENOUS; SUBCUTANEOUS ONCE
Refills: 0 | Status: COMPLETED | OUTPATIENT
Start: 2023-10-17 | End: 2023-10-17

## 2023-10-17 RX ORDER — SODIUM CHLORIDE 9 MG/ML
500 INJECTION, SOLUTION INTRAVENOUS ONCE
Refills: 0 | Status: COMPLETED | OUTPATIENT
Start: 2023-10-17 | End: 2023-10-17

## 2023-10-17 RX ORDER — FENTANYL CITRATE 50 UG/ML
0.5 INJECTION INTRAVENOUS
Qty: 2500 | Refills: 0 | Status: DISCONTINUED | OUTPATIENT
Start: 2023-10-17 | End: 2023-10-21

## 2023-10-17 RX ORDER — PIPERACILLIN AND TAZOBACTAM 4; .5 G/20ML; G/20ML
3.38 INJECTION, POWDER, LYOPHILIZED, FOR SOLUTION INTRAVENOUS ONCE
Refills: 0 | Status: COMPLETED | OUTPATIENT
Start: 2023-10-17 | End: 2023-10-17

## 2023-10-17 RX ORDER — PROPOFOL 10 MG/ML
40 INJECTION, EMULSION INTRAVENOUS ONCE
Refills: 0 | Status: DISCONTINUED | OUTPATIENT
Start: 2023-10-17 | End: 2023-10-17

## 2023-10-17 RX ORDER — MEROPENEM 1 G/30ML
1000 INJECTION INTRAVENOUS EVERY 8 HOURS
Refills: 0 | Status: DISCONTINUED | OUTPATIENT
Start: 2023-10-17 | End: 2023-10-24

## 2023-10-17 RX ORDER — ACETAMINOPHEN 500 MG
750 TABLET ORAL ONCE
Refills: 0 | Status: COMPLETED | OUTPATIENT
Start: 2023-10-17 | End: 2023-10-17

## 2023-10-17 RX ORDER — ENOXAPARIN SODIUM 100 MG/ML
50 INJECTION SUBCUTANEOUS EVERY 12 HOURS
Refills: 0 | Status: DISCONTINUED | OUTPATIENT
Start: 2023-10-17 | End: 2023-10-21

## 2023-10-17 RX ORDER — CEFEPIME 1 G/1
INJECTION, POWDER, FOR SOLUTION INTRAMUSCULAR; INTRAVENOUS
Refills: 0 | Status: DISCONTINUED | OUTPATIENT
Start: 2023-10-17 | End: 2023-10-17

## 2023-10-17 RX ORDER — PIPERACILLIN AND TAZOBACTAM 4; .5 G/20ML; G/20ML
3.38 INJECTION, POWDER, LYOPHILIZED, FOR SOLUTION INTRAVENOUS ONCE
Refills: 0 | Status: DISCONTINUED | OUTPATIENT
Start: 2023-10-17 | End: 2023-10-17

## 2023-10-17 RX ORDER — CHLORHEXIDINE GLUCONATE 213 G/1000ML
15 SOLUTION TOPICAL EVERY 12 HOURS
Refills: 0 | Status: DISCONTINUED | OUTPATIENT
Start: 2023-10-17 | End: 2023-10-21

## 2023-10-17 RX ORDER — NOREPINEPHRINE BITARTRATE/D5W 8 MG/250ML
0.05 PLASTIC BAG, INJECTION (ML) INTRAVENOUS
Qty: 8 | Refills: 0 | Status: DISCONTINUED | OUTPATIENT
Start: 2023-10-17 | End: 2023-10-21

## 2023-10-17 RX ORDER — CEFEPIME 1 G/1
2000 INJECTION, POWDER, FOR SOLUTION INTRAMUSCULAR; INTRAVENOUS ONCE
Refills: 0 | Status: COMPLETED | OUTPATIENT
Start: 2023-10-17 | End: 2023-10-17

## 2023-10-17 RX ORDER — SODIUM CHLORIDE 9 MG/ML
250 INJECTION, SOLUTION INTRAVENOUS ONCE
Refills: 0 | Status: COMPLETED | OUTPATIENT
Start: 2023-10-17 | End: 2023-10-17

## 2023-10-17 RX ORDER — CEFEPIME 1 G/1
2000 INJECTION, POWDER, FOR SOLUTION INTRAMUSCULAR; INTRAVENOUS EVERY 8 HOURS
Refills: 0 | Status: DISCONTINUED | OUTPATIENT
Start: 2023-10-17 | End: 2023-10-17

## 2023-10-17 RX ORDER — ACETAMINOPHEN 500 MG
650 TABLET ORAL ONCE
Refills: 0 | Status: COMPLETED | OUTPATIENT
Start: 2023-10-17 | End: 2023-10-17

## 2023-10-17 RX ADMIN — SODIUM CHLORIDE 500 MILLILITER(S): 9 INJECTION, SOLUTION INTRAVENOUS at 18:23

## 2023-10-17 RX ADMIN — Medication 100 MILLIGRAM(S): at 06:14

## 2023-10-17 RX ADMIN — CHLORHEXIDINE GLUCONATE 1 APPLICATION(S): 213 SOLUTION TOPICAL at 06:25

## 2023-10-17 RX ADMIN — Medication 250 MILLIGRAM(S): at 18:21

## 2023-10-17 RX ADMIN — Medication 300 MILLIGRAM(S): at 10:07

## 2023-10-17 RX ADMIN — MEROPENEM 100 MILLIGRAM(S): 1 INJECTION INTRAVENOUS at 18:21

## 2023-10-17 RX ADMIN — SODIUM CHLORIDE 500 MILLILITER(S): 9 INJECTION, SOLUTION INTRAVENOUS at 09:18

## 2023-10-17 RX ADMIN — Medication 1200 MILLIGRAM(S): at 06:14

## 2023-10-17 RX ADMIN — CEFEPIME 100 MILLIGRAM(S): 1 INJECTION, POWDER, FOR SOLUTION INTRAMUSCULAR; INTRAVENOUS at 09:18

## 2023-10-17 RX ADMIN — MIDODRINE HYDROCHLORIDE 5 MILLIGRAM(S): 2.5 TABLET ORAL at 22:30

## 2023-10-17 RX ADMIN — PIPERACILLIN AND TAZOBACTAM 200 GRAM(S): 4; .5 INJECTION, POWDER, LYOPHILIZED, FOR SOLUTION INTRAVENOUS at 10:27

## 2023-10-17 RX ADMIN — PANTOPRAZOLE SODIUM 40 MILLIGRAM(S): 20 TABLET, DELAYED RELEASE ORAL at 18:21

## 2023-10-17 RX ADMIN — Medication 650 MILLIGRAM(S): at 18:22

## 2023-10-17 RX ADMIN — FENTANYL CITRATE 2.5 MICROGRAM(S)/KG/HR: 50 INJECTION INTRAVENOUS at 22:29

## 2023-10-17 RX ADMIN — HEPARIN SODIUM 5000 UNIT(S): 5000 INJECTION INTRAVENOUS; SUBCUTANEOUS at 06:14

## 2023-10-17 RX ADMIN — Medication 1200 MILLIGRAM(S): at 22:30

## 2023-10-17 RX ADMIN — CHLORHEXIDINE GLUCONATE 15 MILLILITER(S): 213 SOLUTION TOPICAL at 18:22

## 2023-10-17 RX ADMIN — SODIUM CHLORIDE 4000 MILLILITER(S): 9 INJECTION INTRAMUSCULAR; INTRAVENOUS; SUBCUTANEOUS at 11:52

## 2023-10-17 RX ADMIN — MIDODRINE HYDROCHLORIDE 5 MILLIGRAM(S): 2.5 TABLET ORAL at 06:14

## 2023-10-17 RX ADMIN — Medication 5 MILLIGRAM(S): at 22:29

## 2023-10-17 RX ADMIN — PROPOFOL 5 MILLIGRAM(S): 10 INJECTION, EMULSION INTRAVENOUS at 12:11

## 2023-10-17 RX ADMIN — DEXMEDETOMIDINE HYDROCHLORIDE IN 0.9% SODIUM CHLORIDE 6.24 MICROGRAM(S)/KG/HR: 4 INJECTION INTRAVENOUS at 12:12

## 2023-10-17 RX ADMIN — Medication 650 MILLIGRAM(S): at 19:35

## 2023-10-17 RX ADMIN — DEXMEDETOMIDINE HYDROCHLORIDE IN 0.9% SODIUM CHLORIDE 6.24 MICROGRAM(S)/KG/HR: 4 INJECTION INTRAVENOUS at 22:29

## 2023-10-17 RX ADMIN — FENTANYL CITRATE 2.5 MICROGRAM(S)/KG/HR: 50 INJECTION INTRAVENOUS at 14:13

## 2023-10-17 RX ADMIN — PANTOPRAZOLE SODIUM 40 MILLIGRAM(S): 20 TABLET, DELAYED RELEASE ORAL at 06:14

## 2023-10-17 RX ADMIN — HEPARIN SODIUM 5000 UNIT(S): 5000 INJECTION INTRAVENOUS; SUBCUTANEOUS at 18:22

## 2023-10-17 RX ADMIN — SODIUM CHLORIDE 500 MILLILITER(S): 9 INJECTION, SOLUTION INTRAVENOUS at 10:22

## 2023-10-17 RX ADMIN — Medication 4.68 MICROGRAM(S)/KG/MIN: at 22:29

## 2023-10-17 RX ADMIN — Medication 4.68 MICROGRAM(S)/KG/MIN: at 11:52

## 2023-10-17 NOTE — PROVIDER CONTACT NOTE (CHANGE IN STATUS NOTIFICATION) - BACKGROUND
Patient admitted for Pneumonia. Risk of aspiration. Transferred to unit on 2L/NC O2. No sign or symptom of respiratory distress when transferred.

## 2023-10-17 NOTE — DIETITIAN INITIAL EVALUATION ADULT - OTHER CALCULATIONS
Energy: 1154 - 1442 kcal/day (MSJ x 1.2 - 1.5 SF)   Protein: 59 - 75 gm/day (1.2 - 1.3 gm/kg)   Fluid: 1497 - 1746 mL/day (30 - 35 mL/kg)   estimated needs with consideration for age, acuity of illness, multiple Pressure Injuries

## 2023-10-17 NOTE — CHART NOTE - NSCHARTNOTEFT_GEN_A_CORE
Notified by vascular tech that patient has DVT in left common femoral vein. Started full AC with Lovenox, 50mg bid. Notified by vascular tech that duplex showed DVT in left common femoral vein. Started full AC with Lovenox, 50mg bid. Notified by vascular tech that duplex showed DVT in left common femoral vein. Notified by vascular tech that duplex showed DVT in left common femoral vein. Patient with recent admission (9/29 - 10/13) for hemoptysis and duodenal perforation requiring intubation and ICU admission for hypovolemic and septic shock requiring temporary pressor support, no GI intervention done at that time. Patient on GI prophylaxis since then. Hemoglobin has been stable. BP stable on levo. Will start Lovenox 50mg bid. Notified by vascular tech that duplex showed DVT in left common femoral vein. Patient with recent admission (9/29 - 10/13) for hemoptysis and duodenal perforation requiring intubation and ICU admission for hypovolemic and septic shock requiring temporary pressor support, no GI intervention done at that time. Patient on GI prophylaxis since then. Hemoglobin has been stable. BP stable on levo 0.1. Will start Lovenox 50mg bid.

## 2023-10-17 NOTE — SWALLOW BEDSIDE ASSESSMENT ADULT - SLP PERTINENT HISTORY OF CURRENT PROBLEM
58 year old female with a PMHx of Down syndrome, nonverbal at baseline, hypothyroidism, cerebral palsy, congenital pulmonary stenosis, Seizures, presents to the ED from nursing facility for syncope associated with tonic-clonic movement witnessed by aide. Initial vitals at nursing home showed bradycardia and hypoxia for about 10-15 minutes. No fevers, chill, cough, vomiting, diarrhea, difficulty breathing.

## 2023-10-17 NOTE — DIETITIAN INITIAL EVALUATION ADULT - NSFNSPHYEXAMSKINFT_GEN_A_CORE
Pressure Injury 1: Right Plantar, Unstageable  L Foot - Fifth Toe  - DTI  L Foot - Lateral - DTI  L Foot - Medial - DTI  L Buttock - Stage 2

## 2023-10-17 NOTE — CONSULT NOTE ADULT - SUBJECTIVE AND OBJECTIVE BOX
Patient is a 58y old  Female who presents with a chief complaint of Syncope and collapse     (17 Oct 2023 10:25)      HPI:  Patient is a 58 year old female with a PMHx of Down syndrome, nonverbal at baseline, hypothyroidism, cerebral palsy, congenital pulmonary stenosis, Seizures, presents to the ED from nursing facility for syncope associated with tonic-clonic movement witnessed by aide. Initial vitals at nursing home showed bradycardia and hypoxia for about 10-15 minutes. No fevers, chill, cough, vomiting, diarrhea, difficulty breathing.     Hospital course 9/29/2023 -> 10/13/2023 Patient was admitted  for hemoptysis and duodenal perforation requiring intubation and ICU admission for hypovolemic and septic shock requiring temporary pressor support, which resolved. Patient also had acute hypoxemic respiratory failure likely due to multifocal pneumonia. Patient was successfully extubated and completed a course of antibiotics per ID, further infectious workup was unremarkable. Repeat CTAP with contrast showed no contrast extravasation and previous seen foci of retroperitoneal gas no longer identified. The patient was evaluated by surgery and GI, and no acute intervention recommended. Patient with stable hemoglobin. Hospital course complicated by NELA likely prerenal which resolved and NSTEMI likely type II due to shock (Echo 9/30 EF 55 to 60%, Normal left systolic function Diastolic function could not be assessed) with subsequent downtrending trops (0.34 --> 0.11). Patient's hospital course ICU --> Stepdown --> General medical floor. Patient remains hemodynamically stable on general medical, tolerating pureed feeds, and has bowel movements. Patient was discharged on 10/13/2023       In ED:  - Vital signs: BP: 86/55, HR: 77, RR: 24, SpO2: 98% on 4L NC  - Labs showed troponin: 0,02 ( was 0.34 and then 0,11 2 weeks ago), Creatinine 1.1 ( 0.7 on 10/13)    (14 Oct 2023 20:33)      PAST MEDICAL & SURGICAL HISTORY:  Down syndrome      Osteoporosis      Mild anemia      Neuropathy      S/P debridement  of R hip on 3/2/21          SOCIAL HX:   Smoking          UTO                ETOH                            Other    FAMILY HISTORY:  :  No known cardiovacular family hisotry     Review Of Systems:     All ROS are negative except per HPI       Allergies    No Known Allergies    Intolerances          PHYSICAL EXAM    ICU Vital Signs Last 24 Hrs  T(C): 38.5 (17 Oct 2023 09:09), Max: 38.5 (17 Oct 2023 09:09)  T(F): 101.3 (17 Oct 2023 09:09), Max: 101.3 (17 Oct 2023 09:09)  HR: 87 (17 Oct 2023 11:35) (72 - 104)  BP: 88/52 (17 Oct 2023 10:06) (86/43 - 110/55)  BP(mean): 65 (17 Oct 2023 10:06) (65 - 65)  ABP: --  ABP(mean): --  RR: 29 (17 Oct 2023 10:06) (18 - 32)  SpO2: 100% (17 Oct 2023 11:35) (92% - 100%)    O2 Parameters below as of 17 Oct 2023 10:06  Patient On (Oxygen Delivery Method): mask, nonrebreather  O2 Flow (L/min): 15          CONSTITUTIONAL:  Ill appearing in NAD    ENT:   Airway patent,   Mouth with normal mucosa.     CARDIAC:   Normal rate,   Regular rhythm.      RESPIRATORY:   No wheezing  Bilateral BS   Not tachypneic,  No use of accessory muscles    GASTROINTESTINAL:  Abdomen soft,   Non-tender,   No guarding,   + BS      NEUROLOGICAL:   Sedated     SKIN:   Skin normal color for race,   No evidence of rash.            10-16-23 @ 07:01  -  10-17-23 @ 07:00  --------------------------------------------------------  IN:    Oral Fluid: 358 mL  Total IN: 358 mL    OUT:    Voided (mL): 1080 mL  Total OUT: 1080 mL    Total NET: -722 mL          LABS:                          11.3   4.87  )-----------( 363      ( 16 Oct 2023 05:45 )             37.6                                               10-17    142  |  105  |  21<H>  ----------------------------<  167<H>  4.4   |  24  |  1.0    Ca    8.7      17 Oct 2023 07:10  Mg     2.3     10-16    TPro  6.5  /  Alb  3.3<L>  /  TBili  0.2  /  DBili  x   /  AST  32  /  ALT  36  /  AlkPhos  105  10-16                                             Urinalysis Basic - ( 17 Oct 2023 07:10 )    Color: x / Appearance: x / SG: x / pH: x  Gluc: 167 mg/dL / Ketone: x  / Bili: x / Urobili: x   Blood: x / Protein: x / Nitrite: x   Leuk Esterase: x / RBC: x / WBC x   Sq Epi: x / Non Sq Epi: x / Bacteria: x                                                  LIVER FUNCTIONS - ( 16 Oct 2023 05:45 )  Alb: 3.3 g/dL / Pro: 6.5 g/dL / ALK PHOS: 105 U/L / ALT: 36 U/L / AST: 32 U/L / GGT: x                                                                                               Mode: AC/ CMV (Assist Control/ Continuous Mandatory Ventilation)  RR (machine): 14  TV (machine): 300  FiO2: 50  PEEP: 8  ITime: 1  MAP: 13  PIP: 21                                                  X-Rays reviewed                                                                                     ECHO        MEDICATIONS  (STANDING):  chlorhexidine 2% Cloths 1 Application(s) Topical <User Schedule>  dexMEDEtomidine Infusion 0.5 MICROgram(s)/kG/Hr (6.24 mL/Hr) IV Continuous <Continuous>  fentaNYL   Infusion. 0.5 MICROgram(s)/kG/Hr (2.5 mL/Hr) IV Continuous <Continuous>  gabapentin 600 milliGRAM(s) Oral daily  guaiFENesin ER 1200 milliGRAM(s) Oral every 12 hours  heparin   Injectable 5000 Unit(s) SubCutaneous every 12 hours  melatonin 5 milliGRAM(s) Oral at bedtime  midodrine 5 milliGRAM(s) Oral every 8 hours  multivitamin 1 Tablet(s) Oral daily  norepinephrine Infusion 0.05 MICROgram(s)/kG/Min (4.68 mL/Hr) IV Continuous <Continuous>  pantoprazole    Tablet 40 milliGRAM(s) Oral two times a day  piperacillin/tazobactam IVPB.- 3.375 Gram(s) IV Intermittent once  piperacillin/tazobactam IVPB.. 3.375 Gram(s) IV Intermittent every 8 hours  raloxifene 60 milliGRAM(s) Oral daily    MEDICATIONS  (PRN):

## 2023-10-17 NOTE — CONSULT NOTE ADULT - ASSESSMENT
IMPRESSION:    Acute hypoxemic respiratory failure  Suspected upper GI bleed s/p 2 units pRBC  HO recent duodenal perforation   Aspiration pneumonia  HO polymicrobial bacteremia   H/o CP  H/o seizures    PLAN:    CNS: PRN sedation.  CTH and EEG.      HEENT: Oral care.  ET care     PULMONARY: HOB at 45 degrees.  Aspiration precaution.  ARDS network MV settings.  SaO2 92 TO 96%.  DTA.  Monitor PPL and DP     CARDIOVASCULAR: Goal directed fluid resuscitation.  CE.        GI: Protonix BID.  OG feeding.  Bowel regimen     RENAL:    FU lytes.  Correct as needed.      INFECTIOUS DISEASE:  Pan cultures.  Meropenem, Vanc, and Levaquin.  ID eval.  Deescalate after cultures.  Procal.  Fungitel.     HEMATOLOGICAL: DVT prophylaxis seq.  Monitor CBC.  Dimer and Duplex.      ENDOCRINE:  Follow up FS.  Insulin protocol if needed.    MUSCULOSKELETAL: Bedrest.  Off loading.  Wound care.      Prognosis guarded.      MICU

## 2023-10-17 NOTE — DIETITIAN INITIAL EVALUATION ADULT - POUNDS LOST/GAINED
Assessment:   S/P OPEN REDUCTION W/ INTERNAL FIXATION (ORIF) ELBOW- left olecranon and all associated reconstructive procedure - Left on 8/24/2022    Plan:   Xrays discussed  Patient can begin therapy to work on ROM at this time  He should continue to avoid heavy lifting with this arm  Elevate/ice prn edema  He was shown the 4 directions to work on motion today  Can shower, but no scrubbing/soaking  Follow Up:  5  week(s)    To Do Next Visit:  X-rays of the  left  elbow      CHIEF COMPLAINT:  Chief Complaint   Patient presents with    Left Elbow - Post-op         SUBJECTIVE:  Mike Solorzano is a 41 y o  male who presents for follow up after OPEN REDUCTION W/ INTERNAL FIXATION (ORIF) ELBOW- left olecranon and all associated reconstructive procedure - Left on 8/24/2022  Today patient states he does not have much pain, just that the elbow doesn't feel normal  He denies n/t  PHYSICAL EXAMINATION:  Vital signs: There were no vitals taken for this visit  General: well developed and well nourished, alert, oriented times 3, and appears comfortable  Psychiatric: Normal    MUSCULOSKELETAL EXAMINATION:  Incision: Clean, dry, intact   Fracture Blisters are healing well, no signs of infection  Range of Motion: As expected and Limited due to stiffness  Neurovascular status: Neuro intact, good cap refill  Done today: Suture tails clipped      STUDIES REVIEWED:  Images were reviewed in PACS by Dr Birtha Siemens and demonstrate: Well-aligned olecranon fracture s/p ORIF  No evidence of hardware loosening or malfunction         PROCEDURES PERFORMED:  Procedures  No Procedures performed today    Scribe Attestation    I,:  Norma Galeas PA-C am acting as a scribe while in the presence of the attending physician :       I,:  Jermaine Moore personally performed the services described in this documentation    as scribed in my presence :
5

## 2023-10-17 NOTE — CHART NOTE - NSCHARTNOTEFT_GEN_A_CORE
Patient is a 58 year old female with a PMHx of Down syndrome, nonverbal at baseline, hypothyroidism, cerebral palsy, congenital pulmonary stenosis, Seizures, presents to the ED from nursing facility for syncope associated with tonic-clonic movement witnessed by aide. Initial vitals at nursing home showed bradycardia and hypoxia for about 10-15 minutes. No fevers, chill, cough, vomiting, diarrhea, difficulty breathing.     Hospital course 9/29/2023 -> 10/13/2023 Patient was admitted  for hemoptysis and duodenal perforation requiring intubation and ICU admission for hypovolemic and septic shock requiring temporary pressor support, which resolved. Patient also had acute hypoxemic respiratory failure likely due to multifocal pneumonia. Patient was successfully extubated and completed a course of antibiotics per ID, further infectious workup was unremarkable. Repeat CTAP with contrast showed no contrast extravasation and previous seen foci of retroperitoneal gas no longer identified. The patient was evaluated by surgery and GI, and no acute intervention recommended. Patient with stable hemoglobin. Hospital course complicated by NELA likely prerenal which resolved and NSTEMI likely type II due to shock (Echo 9/30 EF 55 to 60%, Normal left systolic function Diastolic function could not be assessed) with subsequent downtrending trops (0.34 --> 0.11). Patient's hospital course ICU --> Stepdown --> General medical floor. Patient remains hemodynamically stable on general medical, tolerating pureed feeds, and has bowel movements. Patient was discharged on 10/13/2023     In ED:  - Vital signs: BP: 86/55, HR: 77, RR: 24, SpO2: 98% on 4L NC  - Labs showed troponin: 0,02 ( was 0.34 and then 0,11 2 weeks ago), Creatinine 1.1 ( 0.7 on 10/13)     10/17/23: Patient had possible aspiration episode this morning around 6am (per night resident), and was desaturating to low 80s, Cxr showed worsening lower lobe pna > transitioned to nonrebreather > maxed out on nonrebreather satting at 91%, BP 88/52, using accessory muscles > transitioned to high flow > no improvement in SpO2, BP 80s/50s > RR called > anesthesia intubated > peripheral IV access acquired and started levophed, and precedex > BP 90s/50s > gave propofol during central line placement > levophed transitioned to central line. Patient is stable and will be transferred to ICU. Patient is a 58 year old female with a PMHx of Down syndrome, nonverbal at baseline, hypothyroidism, cerebral palsy, congenital pulmonary stenosis, Seizures, presents to the ED from nursing facility for syncope associated with tonic-clonic movement witnessed by aide. Initial vitals at nursing home showed bradycardia and hypoxia for about 10-15 minutes. No fevers, chill, cough, vomiting, diarrhea, difficulty breathing.     Hospital course 9/29/2023 -> 10/13/2023 Patient was admitted  for hemoptysis and duodenal perforation requiring intubation and ICU admission for hypovolemic and septic shock requiring temporary pressor support, which resolved. Patient also had acute hypoxemic respiratory failure likely due to multifocal pneumonia. Patient was successfully extubated and completed a course of antibiotics per ID, further infectious workup was unremarkable. Repeat CTAP with contrast showed no contrast extravasation and previous seen foci of retroperitoneal gas no longer identified. The patient was evaluated by surgery and GI, and no acute intervention recommended. Patient with stable hemoglobin. Hospital course complicated by NELA likely prerenal which resolved and NSTEMI likely type II due to shock (Echo 9/30 EF 55 to 60%, Normal left systolic function Diastolic function could not be assessed) with subsequent downtrending trops (0.34 --> 0.11). Patient's hospital course ICU --> Stepdown --> General medical floor. Patient remains hemodynamically stable on general medical, tolerating pureed feeds, and has bowel movements. Patient was discharged on 10/13/2023     In ED:  - Vital signs: BP: 86/55, HR: 77, RR: 24, SpO2: 98% on 4L NC  - Labs showed troponin: 0,02 ( was 0.34 and then 0,11 2 weeks ago), Creatinine 1.1 ( 0.7 on 10/13)     10/17/23: Patient had possible aspiration episode this morning around 6am (per night resident), and was desaturating to low 80s, Cxr showed worsening lower lobe pna > transitioned to nonrebreather > maxed out on nonrebreather satting at 91%, BP 88/52, using accessory muscles > transitioned to high flow > no improvement in SpO2, BP 80s/50s > RR called > anesthesia intubated > peripheral IV access acquired and started levophed, and precedex > BP 90s/50s > gave propofol during central line placement > levophed transitioned to central line. Patient is stable and will be transferred to ICU.     Follow up CThead, EEG, trach aspirate, fungitel, procal, CBC, CMP, lactate, d-dimer, abg, duplex, repeat Cxr tomorrow AM.

## 2023-10-17 NOTE — CHART NOTE - NSCHARTNOTEFT_GEN_A_CORE
Patient scheduled for OR debridement 10/20 afternoon  Please have NPO midnight prior and all clearances completed

## 2023-10-17 NOTE — DIETITIAN INITIAL EVALUATION ADULT - PERTINENT LABORATORY DATA
10-17    142  |  105  |  21<H>  ----------------------------<  167<H>  4.4   |  24  |  1.0    Ca    8.7      17 Oct 2023 07:10  Mg     2.3     10-16    TPro  6.5  /  Alb  3.3<L>  /  TBili  0.2  /  DBili  x   /  AST  32  /  ALT  36  /  AlkPhos  105  10-16

## 2023-10-17 NOTE — PROGRESS NOTE ADULT - SUBJECTIVE AND OBJECTIVE BOX
TEA ECHAVARRIA  58y, Female  Allergy: No Known Allergies      LOS  3d    CHIEF COMPLAINT: Pre-syncope (17 Oct 2023 12:31)      INTERVAL EVENTS/HPI  - T(F): , Max: 101.3 (10-17-23 @ 09:09)  - rapid response called for hypotension/hypoxemia    - WBC Count: 25.27 (10-17-23 @ 12:35)  WBC Count: 4.87 (10-16-23 @ 05:45)     - Creatinine: 0.9 (10-17-23 @ 12:35)  Creatinine: 1.0 (10-17-23 @ 07:10)       ROS  unable to obtain history secondary to patient's mental status and/or sedation      VITALS:  T(F): 97.4, Max: 101.3 (10-17-23 @ 09:09)  HR: 76  BP: 132/55  RR: 21Vital Signs Last 24 Hrs  T(C): 36.3 (17 Oct 2023 13:22), Max: 38.5 (17 Oct 2023 09:09)  T(F): 97.4 (17 Oct 2023 13:22), Max: 101.3 (17 Oct 2023 09:09)  HR: 76 (17 Oct 2023 13:22) (72 - 104)  BP: 132/55 (17 Oct 2023 13:22) (86/43 - 132/55)  BP(mean): 65 (17 Oct 2023 10:06) (65 - 65)  RR: 21 (17 Oct 2023 13:22) (21 - 32)  SpO2: 91% (17 Oct 2023 13:22) (91% - 100%)    Parameters below as of 17 Oct 2023 10:06  Patient On (Oxygen Delivery Method): mask, nonrebreather  O2 Flow (L/min): 15      PHYSICAL EXAM:  Gen: NAD, resting in bed  HEENT: Normocephalic, atraumatic  Neck: supple, no lymphadenopathy  CV: Regular rate & regular rhythm  Lungs: decreased BS at bases, no fremitus  Abdomen: Soft, BS present  Ext: Warm, well perfused  Neuro: non focal, awake  Skin: no rash, no erythema  Lines: no phlebitis    FH: Non-contributory  Social Hx: Non-contributory    TESTS & MEASUREMENTS:                        11.6   25.27 )-----------( 362      ( 17 Oct 2023 12:35 )             38.1     10-17    137  |  102  |  20  ----------------------------<  157<H>  4.0   |  22  |  0.9    Ca    8.8      17 Oct 2023 12:35  Phos  3.1     10-17  Mg     1.8     10-17    TPro  6.5  /  Alb  3.3<L>  /  TBili  0.6  /  DBili  x   /  AST  29  /  ALT  28  /  AlkPhos  108  10-17      LIVER FUNCTIONS - ( 17 Oct 2023 12:35 )  Alb: 3.3 g/dL / Pro: 6.5 g/dL / ALK PHOS: 108 U/L / ALT: 28 U/L / AST: 29 U/L / GGT: x           Urinalysis Basic - ( 17 Oct 2023 12:35 )    Color: x / Appearance: x / SG: x / pH: x  Gluc: 157 mg/dL / Ketone: x  / Bili: x / Urobili: x   Blood: x / Protein: x / Nitrite: x   Leuk Esterase: x / RBC: x / WBC x   Sq Epi: x / Non Sq Epi: x / Bacteria: x        Culture - Sputum (collected 10-04-23 @ 12:00)  Source: Trach Asp Tracheal Aspirate  Gram Stain (10-04-23 @ 22:58):    Few polymorphonuclear leukocytes per low power field    Rare Squamous epithelial cells per low power field    No organisms seen per oil power field  Final Report (10-06-23 @ 10:31):    No growth    Culture - Sputum (collected 10-02-23 @ 11:10)  Source: Trach Asp Tracheal Aspirate  Gram Stain (10-03-23 @ 07:33):    Moderate polymorphonuclear leukocytes per low power field    Rare Squamous epithelial cells per low power field    Few Gram positive cocci in pairs seen per oil power field    Rare Gram Positive Rods seen per oil power field    Rare Yeast like cells seen per oil power field  Final Report (10-04-23 @ 17:12):    No growth at 48 hours    Culture - Urine (collected 09-29-23 @ 10:10)  Source: Clean Catch Clean Catch (Midstream)  Final Report (10-01-23 @ 01:13):    <10,000 CFU/mL Normal Urogenital Mili    Culture - Blood (collected 09-29-23 @ 09:47)  Source: .Blood Blood-Peripheral  Final Report (10-04-23 @ 17:00):    No growth at 5 days    Culture - Blood (collected 09-29-23 @ 09:47)  Source: .Blood Blood-Peripheral  Gram Stain (10-01-23 @ 09:15):    Growth in aerobic bottle: Gram Positive Rods and Gram positive cocci in    pairs    Growth in anaerobic bottle: Gram positive cocci in pairs  Final Report (10-05-23 @ 22:52):    Growth in aerobic bottle: Corynebacterium amycolatum "Susceptibilities    not performed"    Growth in anaerobic bottle: Peptoniphilus harei group "Susceptibilities    not performed"    Growth in aerobic bottle: Gram positive cocci in pairs    Organism seen in Gram stain is non-viable after prolonged    incubation and repeated subculture.    Direct identification is available within approximately 3-5    hours either by Blood Panel Multiplexed PCR or Direct    MALDI-TOF. Details: [email or web address]  Organism: Blood Culture PCR (10-05-23 @ 22:52)  Organism: Blood Culture PCR (10-05-23 @ 22:52)      Method Type: PCR      -  Blood PCR Panel: NEG        Blood Gas Venous - Lactate: 1.90 mmol/L (10-14-23 @ 18:26)      INFECTIOUS DISEASES TESTING  Procalcitonin, Serum: 0.07 (10-15-23 @ 07:28)  COVID-19 PCR: NotDetec (10-12-23 @ 09:14)  Procalcitonin, Serum: 0.40 (10-07-23 @ 10:54)  Legionella Antigen, Urine: Negative (09-30-23 @ 14:36)  MRSA PCR Result.: Negative (09-29-23 @ 16:50)  Procalcitonin, Serum: 9.78 (08-12-23 @ 11:25)  MRSA PCR Result.: Negative (08-12-23 @ 06:10)  Rapid RVP Result: NotDetec (08-12-23 @ 01:33)  Procalcitonin, Serum: 0.63 (08-10-23 @ 08:46)  Procalcitonin, Serum: 7.82 (08-04-23 @ 16:13)  MRSA PCR Result.: Negative (08-04-23 @ 14:52)  Rapid RVP Result: NotDetec (08-04-23 @ 03:00)      INFLAMMATORY MARKERS  Sedimentation Rate, Erythrocyte: 67 mm/Hr (10-15-23 @ 07:28)  C-Reactive Protein, Serum: 16.1 mg/L (10-15-23 @ 07:28)      RADIOLOGY & ADDITIONAL TESTS:  I have personally reviewed the last available Chest xray  CXR  Xray Chest 1 View- PORTABLE-Urgent:   ACC: [id number] EXAM:  XR CHEST PORTABLE URGENT 1V   ORDERED BY: MOY HAAS     PROCEDURE DATE:  10/17/2023          INTERPRETATION:  Clinical History / Reason for exam: Pneumonia    Comparison : Chest radiograph October 14, 2023.    Technique/Positioning: Frontal portable.    Findings:    Support devices: None.    Cardiac/mediastinum/hilum: Unremarkable.    Lung parenchyma/Pleura: Left lower lobe pneumonia    Skeleton/soft tissues: Unremarkable.    Impression:    Worsening pneumonia, left lower lung.        --- End of Report ---            FAYE JOHNSON MD; Attending Interventional Radiologist  This document has been electronically signed. Oct 17 2023  7:15AM (10-17-23 @ 06:10)      CT      CARDIOLOGY TESTING  12 Lead ECG:   Ventricular Rate 115 BPM    Atrial Rate 115 BPM    P-R Interval 130 ms    QRS Duration 68 ms    Q-T Interval 348 ms    QTC Calculation(Bazett) 481 ms    P Axis 43 degrees    R Axis 99 degrees    T Axis 22 degrees    Diagnosis Line Sinus tachycardia  Rightward axis  Otherwise normal ECG    Confirmed by Gordo Mckee ([id number]) on 10/16/2023 12:41:36 PM (10-14-23 @ 16:43)  12 Lead ECG:   Ventricular Rate 152 BPM    Atrial Rate 107 BPM    P-R Interval 98 ms    QRS Duration 68 ms    Q-T Interval 320 ms    QTC Calculation(Bazett) 508 ms    P Axis 21 degrees    R Axis 86 degrees    T Axis 29 degrees    Diagnosis Line Normal sinus rhythm  Cannot rule out Anterior infarct , age undetermined  Abnormal ECG  *** Poor data quality, interpretation may be adversely affected  Confirmed by Amol Lopez ([id number]) on 10/15/2023 2:06:47 PM (10-14-23 @ 16:42)      MEDICATIONS  chlorhexidine 2% Cloths 1 Topical <User Schedule>  dexMEDEtomidine Infusion 0.5 IV Continuous <Continuous>  fentaNYL   Infusion. 0.5 IV Continuous <Continuous>  gabapentin 600 Oral daily  guaiFENesin ER 1200 Oral every 12 hours  heparin   Injectable 5000 SubCutaneous every 12 hours  melatonin 5 Oral at bedtime  midodrine 5 Oral every 8 hours  multivitamin 1 Oral daily  norepinephrine Infusion 0.05 IV Continuous <Continuous>  pantoprazole    Tablet 40 Oral two times a day  piperacillin/tazobactam IVPB.- 3.375 IV Intermittent once  piperacillin/tazobactam IVPB.. 3.375 IV Intermittent every 8 hours  raloxifene 60 Oral daily      WEIGHT  Weight (kg): 49.9 (10-14-23 @ 16:19)  Creatinine: 0.9 mg/dL (10-17-23 @ 12:35)  Creatinine: 1.0 mg/dL (10-17-23 @ 07:10)      ANTIBIOTICS:  piperacillin/tazobactam IVPB.- 3.375 Gram(s) IV Intermittent once  piperacillin/tazobactam IVPB.. 3.375 Gram(s) IV Intermittent every 8 hours      All available historical records have been reviewed

## 2023-10-17 NOTE — PROVIDER CONTACT NOTE (CHANGE IN STATUS NOTIFICATION) - ACTION/TREATMENT ORDERED:
Place patient on 15L Non-rebreather. Place patient on continuous pulse oximetry. Perform ABG. Complete X-Ray. Administer medications as ordered. Follow up with MD and orders.

## 2023-10-17 NOTE — PROGRESS NOTE ADULT - ASSESSMENT
58 year old female with a PMHx of Down syndrome, nonverbal at baseline, hypothyroidism, cerebral palsy, congenital pulmonary stenosis, Seizures, presents to the ED from nursing facility for syncope associated with tonic-clonic movement witnessed by aide. Initial vitals at nursing home showed bradycardia and hypoxia for about 10-15 minutes. No fevers, chill, cough, vomiting, diarrhea, difficulty breathing.      10/17: Refer to Rapid Response note for further details. Pt with fever, hypoxia, and new LLL consolidation on XR. Pt obtunded with worsening hypoxia in the AM, intubated for airway protection, transferred to ICU.     Sepsis, not present on admission, 2/2 Aspiration Pneumonia   Acute Hypoxemic Respiratory Failure   - IVF bolus , started on broad spectrum abx, now on Meropenem   - intubated on floor and transferred to ICU   - management per critical care   - ID following closely     Seizure Like activity:   Episode of Tonic clonic movement and syncope:   Patient was hypoxic and bradycardic after the episode, in the ED BP 86/55  Neuro exam: patient with contracted LE, moves upper extremities.   Head CT   EEG is negative for seizure activity.   Neurology follow up. Continue Gabapentin.   Seizure precaution     Right planter foot ulcers:   Patient with multiple decubitus ulcers on her right foot, less on left foot.   ESR 67, CRP 16,   Right Foot Xray showed post transmetatarsal amputation of the first ray with periosteal reaction at the amputation stump, suspicious for recurrent osteomyelitis.  There is second toe ulcer with osseous erosion/destruction at the second distal phalangeal tuft, consistent with osteomyelitis. There is dorsal   Seen by podiatry, may need wound debridement.     Buttock decubitus ulcer   wound care consult     Recent NSTEMI type II due to shock   Echo results 9/30 EF 55 to 60 %, Normal left systolic function Diastolic function could not be assessed.   Trend trop: 0.34 -> 0.11 -> 0.02 (Today)      Down syndrome   Cerebral palsy  Congential Pulmonary stenosis  Nonverbal at baseline     Osteoporosis  Raloxifene     Critically ill, poor prognosis     Code status: Full CODE   pending:  upgraded to ICU, pt is critically ill, intubated on floor   ICU fellow called and updated Dr. Serrano,  medical director     Pt is a [facility]en of the [facility], critical decisions can be made 2 physician consent. Noncritical decisions will need approval from community advisory board.         Total time spent to complete patient's bedside assessment, review medical chart, discuss medical plan of care with covering medical team was more than 50 minutes  with >50% of time spent face to face with patient, discussion with patient/family and/or coordination of care      Nicole Osborn DO

## 2023-10-17 NOTE — CHART NOTE - NSCHARTNOTEFT_GEN_A_CORE
Patient is a 58 year old female with a PMHx of Down syndrome, nonverbal at baseline, hypothyroidism, cerebral palsy, congenital pulmonary stenosis, Seizures, presents to the ED from nursing facility for syncope associated with tonic-clonic movement witnessed by aide. Initial vitals at nursing home showed bradycardia and hypoxia for about 10-15 minutes. No fevers, chill, cough, vomiting, diarrhea, difficulty breathing.     Hospital course 9/29/2023 -> 10/13/2023 Patient was admitted  for hemoptysis and duodenal perforation requiring intubation and ICU admission for hypovolemic and septic shock requiring temporary pressor support, which resolved. Patient also had acute hypoxemic respiratory failure likely due to multifocal pneumonia. Patient was successfully extubated and completed a course of antibiotics per ID, further infectious workup was unremarkable. Repeat CTAP with contrast showed no contrast extravasation and previous seen foci of retroperitoneal gas no longer identified. The patient was evaluated by surgery and GI, and no acute intervention recommended. Patient with stable hemoglobin. Hospital course complicated by NELA likely prerenal which resolved and NSTEMI likely type II due to shock (Echo 9/30 EF 55 to 60%, Normal left systolic function Diastolic function could not be assessed) with subsequent downtrending trops (0.34 --> 0.11). Patient's hospital course ICU --> Stepdown --> General medical floor. Patient remains hemodynamically stable on general medical, tolerating pureed feeds, and has bowel movements. Patient was discharged on 10/13/2023     Arrive to ED 10/14/2023 from nursing facility for syncope associated with tonic-clonic movement witnessed by aide. Initial vitals at nursing home showed bradycardia and hypoxia for about 10-15 minutes. No fevers, chill, cough, vomiting, diarrhea, difficulty breathing.      - Vital signs: BP: 86/55, HR: 77, RR: 24, SpO2: 98% on 4L NC  - Labs showed troponin: 0,02 ( was 0.34 and then 0,11 2 weeks ago), Creatinine 1.1 ( 0.7 on 10/13)   - Head CT negative for acute pathology  - rEEG: generalized slowing, no seizure activity  - Echo results 9/30 EF 55 to 60 %, Normal left systolic function Diastolic function could not be assessed.   - Now off tele (recent NSTEMI)  - Neuro recs: continue Gabapentin 600mg PO QD F/u outpatient  - zosyn started (10/17) for LLL PNA and infected foot ulcer (podiatry and ID following)    10/17/23: Patient had possible aspiration episode this morning around 6am (per night resident), and was desaturating to low 80s, Cxr showed worsening lower lobe pna > transitioned to nonrebreather > maxed out on nonrebreather satting at 91%, BP 88/52, using accessory muscles > transitioned to high flow > no improvement in SpO2, BP 80s/50s > RR called > anesthesia intubated > peripheral IV access acquired and started levophed, and precedex > BP 90s/50s > gave propofol during central line placement > levophed transitioned to central line. Patient is stable and will be transferred to ICU. Patient is a 58 year old female with a PMHx of Down syndrome, nonverbal at baseline, hypothyroidism, cerebral palsy, congenital pulmonary stenosis, Seizures, presents to the ED from nursing facility for syncope associated with tonic-clonic movement witnessed by aide. Initial vitals at nursing home showed bradycardia and hypoxia for about 10-15 minutes. No fevers, chill, cough, vomiting, diarrhea, difficulty breathing.     Hospital course 9/29/2023 -> 10/13/2023 Patient was admitted  for hemoptysis and duodenal perforation requiring intubation and ICU admission for hypovolemic and septic shock requiring temporary pressor support, which resolved. Patient also had acute hypoxemic respiratory failure likely due to multifocal pneumonia. Patient was successfully extubated and completed a course of antibiotics per ID, further infectious workup was unremarkable. Repeat CTAP with contrast showed no contrast extravasation and previous seen foci of retroperitoneal gas no longer identified. The patient was evaluated by surgery and GI, and no acute intervention recommended. Patient with stable hemoglobin. Hospital course complicated by NELA likely prerenal which resolved and NSTEMI likely type II due to shock (Echo 9/30 EF 55 to 60%, Normal left systolic function Diastolic function could not be assessed) with subsequent downtrending trops (0.34 --> 0.11). Patient's hospital course ICU --> Stepdown --> General medical floor. Patient remains hemodynamically stable on general medical, tolerating pureed feeds, and has bowel movements. Patient was discharged on 10/13/2023     Arrive to ED 10/14/2023 from nursing facility for syncope associated with tonic-clonic movement witnessed by aide. Initial vitals at nursing home showed bradycardia and hypoxia for about 10-15 minutes. No fevers, chill, cough, vomiting, diarrhea, difficulty breathing.      - Vital signs: BP: 86/55, HR: 77, RR: 24, SpO2: 98% on 4L NC  - Labs showed troponin: 0,02 ( was 0.34 and then 0,11 2 weeks ago), Creatinine 1.1 ( 0.7 on 10/13)   - Head CT negative for acute pathology  - rEEG: generalized slowing, no seizure activity  - Echo results 9/30 EF 55 to 60 %, Normal left systolic function Diastolic function could not be assessed.   - Now off tele (recent NSTEMI)  - Neuro recs: continue Gabapentin 600mg PO QD F/u outpatient  - zosyn started (10/17) for LLL PNA and infected foot ulcer (podiatry and ID following)    10/17/23: Patient had possible aspiration episode this morning around 6am (per night resident), and was desaturating to low 80s, Cxr showed worsening lower lobe pna > transitioned to nonrebreather > maxed out on nonrebreather satting at 91%, BP 88/52, using accessory muscles > transitioned to high flow > no improvement in SpO2, BP 80s/50s > RR called > anesthesia intubated > peripheral IV access acquired and started levophed, and precedex > BP 90s/50s > gave propofol during central line placement > levophed transitioned to central line. Patient is stable and will be transferred to ICU.    *Patient is scheduled for debridement with podiatry on Friday, cardio consult needs to be placed thursday for risk stratification and NPO @midnight.

## 2023-10-17 NOTE — PROGRESS NOTE ADULT - SUBJECTIVE AND OBJECTIVE BOX
TEA ECHAVARRIA  58y, Female  Allergy: No Known Allergies    Hospital Day: 3d    Patient seen and examined earlier this morning. Pt hypoxic overnight started on NRB and then HFNC. Pt also febrile and hypotensive, concerning for sepsis/ shock. CXR with new LLL opacity c/f aspiration. Critical care consulted, and due to poor mentation and worsening hypoxia pt intubated for airway protection. Transferred to ICU.     PMH/PSH:  PAST MEDICAL & SURGICAL HISTORY:  Down syndrome      Osteoporosis      Mild anemia      Neuropathy      S/P debridement  of R hip on 3/2/21          LAST 24-Hr EVENTS:    VITALS:  T(F): 97.4 (10-17-23 @ 13:22), Max: 101.3 (10-17-23 @ 09:09)  HR: 76 (10-17-23 @ 13:22)  BP: 132/55 (10-17-23 @ 13:22) (86/43 - 132/55)  RR: 21 (10-17-23 @ 13:22)  SpO2: 91% (10-17-23 @ 13:22)  FiO2: 50      TESTS & MEASUREMENTS:  Weight (Kg):   BMI (kg/m2): 23.8 (10-17)    10-15-23 @ 07:01  -  10-16-23 @ 07:00  --------------------------------------------------------  IN: 740 mL / OUT: 950 mL / NET: -210 mL    10-16-23 @ 07:01  -  10-17-23 @ 07:00  --------------------------------------------------------  IN: 358 mL / OUT: 1080 mL / NET: -722 mL                            11.3   22.30 )-----------( 356      ( 17 Oct 2023 17:20 )             36.7     PT/INR - ( 17 Oct 2023 12:35 )   PT: 12.50 sec;   INR: 1.09 ratio         PTT - ( 17 Oct 2023 12:35 )  PTT:22.9 sec  10-17    137  |  102  |  20  ----------------------------<  157<H>  4.0   |  22  |  0.9    Ca    8.8      17 Oct 2023 12:35  Phos  3.1     10-17  Mg     1.8     10-17    TPro  6.5  /  Alb  3.3<L>  /  TBili  0.6  /  DBili  x   /  AST  29  /  ALT  28  /  AlkPhos  108  10-17    LIVER FUNCTIONS - ( 17 Oct 2023 12:35 )  Alb: 3.3 g/dL / Pro: 6.5 g/dL / ALK PHOS: 108 U/L / ALT: 28 U/L / AST: 29 U/L / GGT: x           CARDIAC MARKERS ( 17 Oct 2023 12:35 )  x     / 0.01 ng/mL / 31 U/L / x     / x            Urinalysis Basic - ( 17 Oct 2023 12:35 )    Color: x / Appearance: x / SG: x / pH: x  Gluc: 157 mg/dL / Ketone: x  / Bili: x / Urobili: x   Blood: x / Protein: x / Nitrite: x   Leuk Esterase: x / RBC: x / WBC x   Sq Epi: x / Non Sq Epi: x / Bacteria: x      Procalcitonin, Serum: 0.07 ng/mL (10-15-23 @ 07:28)    D-Dimer Assay, Quantitative: 1572 ng/mL DDU (10-17-23 @ 12:35)        COVID-19 PCR: NotDetec (10-12-23 @ 09:14)      RADIOLOGY, ECG, & ADDITIONAL TESTS:      RECENT DIAGNOSTIC ORDERS:  Xray Chest 1 View- PORTABLE-Urgent: Urgent   Indication: OG tube position  Transport: Portable,  w/ Monitor,  w/ Ventilator,  w/ IV Pole  Exam Completed (10-17-23 @ 16:37)  EEG:   Reson for this test: per crit attending  Transport: Portable,  w/ Monitor,  w/ Ventilator,  w/ IV Pole  Hemorrhage:No  Brain Death: No  MI:No  Sickle Cell:No   Stimulants:No  Anti-Convulsants:No   Anti-Depressants:No  Contr Substances:No (10-17-23 @ 14:05)  Lactate, Blood: STAT  Addl Info: Draw repeat Lactate, STAT (10-17-23 @ 13:39)  VA Duplex Lower Ext Vein Scan, Bilat: 13:26  Exam in Progress (10-17-23 @ 13:28)  Culture - Sputum: Routine  Specimen Source: Tracheal Aspirate  Addl Info: Deep trach aspirate (10-17-23 @ 13:26)  Procalcitonin, Serum: 16:00 (10-17-23 @ 13:26)  Fungitell: 16:00 (10-17-23 @ 13:26)  Xray Chest 1 View- PORTABLE-Routine: AM   Indication: f/u  Transport: Portable (10-17-23 @ 12:57)  Magnesium: AM Sched. Collection: 18-Oct-2023 04:30 (10-17-23 @ 12:57)  Comprehensive Metabolic Panel: AM Sched. Collection: 18-Oct-2023 04:30 (10-17-23 @ 12:57)  Complete Blood Count + Automated Diff: AM Sched. Collection: 18-Oct-2023 04:30 (10-17-23 @ 12:57)  Culture - Urine: Routine  Specimen Source: Clean Catch (Midstream)  Addl Info: If indwelling urinary catheter > 14 days, obtain an order to remove and replace prior to c (10-17-23 @ 12:56)  Urinalysis: Routine (10-17-23 @ 12:56)  Culture - Blood: STAT  Specimen Source: Blood (10-17-23 @ 12:49)  Culture - Blood: STAT  Specimen Source: Blood (10-17-23 @ 12:35)  Procalcitonin, Serum: STAT (10-17-23 @ 11:34)  D-Dimer Assay, Quantitative:  Start Date:17-Oct-2023. STAT (10-17-23 @ 11:29)  MRSA/MSSA PCR: Routine (10-17-23 @ 11:17)  Blood Gas Profile - Arterial: STAT (10-17-23 @ 11:17)  Magnesium: 16:00 (10-17-23 @ 10:43)  Comprehensive Metabolic Panel: 16:00 (10-17-23 @ 10:43)  Diet, NPO (10-17-23 @ 06:55)      MEDICATIONS:  MEDICATIONS  (STANDING):  acetaminophen     Tablet .. 650 milliGRAM(s) Oral once  chlorhexidine 0.12% Liquid 15 milliLiter(s) Oral Mucosa every 12 hours  chlorhexidine 2% Cloths 1 Application(s) Topical <User Schedule>  dexMEDEtomidine Infusion 0.5 MICROgram(s)/kG/Hr (6.24 mL/Hr) IV Continuous <Continuous>  fentaNYL   Infusion. 0.5 MICROgram(s)/kG/Hr (2.5 mL/Hr) IV Continuous <Continuous>  gabapentin 600 milliGRAM(s) Oral daily  guaiFENesin ER 1200 milliGRAM(s) Oral every 12 hours  heparin   Injectable 5000 Unit(s) SubCutaneous every 12 hours  lactated ringers Bolus 250 milliLiter(s) IV Bolus once  melatonin 5 milliGRAM(s) Oral at bedtime  meropenem  IVPB 1000 milliGRAM(s) IV Intermittent every 8 hours  midodrine 5 milliGRAM(s) Oral every 8 hours  multivitamin 1 Tablet(s) Oral daily  norepinephrine Infusion 0.05 MICROgram(s)/kG/Min (4.68 mL/Hr) IV Continuous <Continuous>  pantoprazole    Tablet 40 milliGRAM(s) Oral two times a day  raloxifene 60 milliGRAM(s) Oral daily  vancomycin  IVPB 750 milliGRAM(s) IV Intermittent once    MEDICATIONS  (PRN):      HOME MEDICATIONS:  gabapentin 600 mg oral tablet (10-14)  melatonin 5 mg oral tablet (10-14)  Multiple Vitamins oral tablet (10-14)  raloxifene 60 mg oral tablet (10-14)      PHYSICAL EXAM:  GENERAL: obtunded this am   HNENT: PERR  NECK: No LAD/swelling  CHEST/LUNG:  no wheezes or ronchi anteriorally, unable to listen posteriorly due to pt positioning   HEART: regular rate no murmurs   ABDOMEN: Soft, NT  EXTREMITIES:  Warm well perfused, no edema

## 2023-10-17 NOTE — DIETITIAN INITIAL EVALUATION ADULT - OTHER INFO
58 year old female with a PMHx of Down syndrome, nonverbal at baseline, hypothyroidism, cerebral palsy, congenital pulmonary stenosis, Seizures, presents to the ED from nursing facility for syncope associated with tonic-clonic movement witnessed by aide. Initial vitals at nursing home showed bradycardia and hypoxia for about 10-15 minutes. No fevers, chill, cough, vomiting, diarrhea, difficulty breathing.    Seizure like activity - episode of tonic clonic movement and syncope. Patient was hypoxic and bradycardic after the episode, in the ED BP 86/55; Right planter foot ulcers - Patient with multiple decubitus ulcers on her right foots, less on left foot; Buttock decubitus ulcer; Recent NSTEMI type II due to shock; Down Syndrome; Cerebral Palsy; Congenital Pulmonary stenosis; Nonverbal at baseline; Osteoporosis.    SLP swallow evaluation 10/17: unable to assess swallow function at this time as pt is on NRB. Known to SLP dept with FEES earlier this month while on HFNC. On RA pt was upgraded to puree/thins prior to d/c.  NPO with alternate means of nutrition/hydration - SLP to f/u as respiratory status improves

## 2023-10-17 NOTE — DIETITIAN INITIAL EVALUATION ADULT - PERTINENT MEDS FT
MEDICATIONS  (STANDING):  chlorhexidine 2% Cloths 1 Application(s) Topical <User Schedule>  gabapentin 600 milliGRAM(s) Oral daily  guaiFENesin ER 1200 milliGRAM(s) Oral every 12 hours  heparin   Injectable 5000 Unit(s) SubCutaneous every 12 hours  melatonin 5 milliGRAM(s) Oral at bedtime  midodrine 5 milliGRAM(s) Oral every 8 hours  multivitamin 1 Tablet(s) Oral daily  pantoprazole    Tablet 40 milliGRAM(s) Oral two times a day  piperacillin/tazobactam IVPB. 3.375 Gram(s) IV Intermittent once  piperacillin/tazobactam IVPB.- 3.375 Gram(s) IV Intermittent once  piperacillin/tazobactam IVPB.. 3.375 Gram(s) IV Intermittent every 8 hours  raloxifene 60 milliGRAM(s) Oral daily    MEDICATIONS  (PRN):

## 2023-10-17 NOTE — DIETITIAN INITIAL EVALUATION ADULT - ENTERAL
If planned to have NGT placed, would recommend the following EN regimen: Jevity 1.2 bolus feeds 300 mL q6hrs (4x) - 1200 mL total volume, 1440 kcal, 67 gm Protein, 972 mL + (100 mL Flushes pre/post feeds) = 1772 mL total water    *Would start feeds at 120 mL on day 1 and advance 120 mL each day as tolerated until goal rate of 300 mL is reached

## 2023-10-17 NOTE — PROVIDER CONTACT NOTE (CHANGE IN STATUS NOTIFICATION) - ASSESSMENT
Patient's vitals were /55, , RR 32, O2 saturation 80% on 2L/ NC. Labored breathing. Use of accessory muscles. Lethargic. Will wake with sternal rub.

## 2023-10-17 NOTE — DIETITIAN INITIAL EVALUATION ADULT - ORAL INTAKE PTA/DIET HISTORY
Patient noted to be disoriented. Nutrition hx obtained from patient's aide from group home present at bedside. Patient was on a puree diet with thin liquids. She was able to feed herself and ate very well. Patient's weight hx at the group home: From January 2023 to Now weight fluctuated from 115 - 119 lbs. During last hospitalization she was 123 lbs (August 2023). NKFA, no food intolerances

## 2023-10-17 NOTE — PROGRESS NOTE ADULT - ASSESSMENT
ASSESSMENT   58 year old female with a PMHx of Down syndrome, nonverbal at baseline, hypothyroidism, cerebral palsy, congenital pulmonary stenosis, Seizures, presents to the ED from nursing facility for syncope associated with tonic-clonic movement witnessed by aide. Initial vitals at nursing home showed bradycardia and hypoxia for about 10-15 minutes. N    IMPRESSION  #Seizure like activity   #Right planter foot ulcers - two full thickness ulcers - serous drainage with mild erythema with OM  - MR Foot No Cont, Right (10.16.23 @ 21:51): IMPRESSION: 1.  Limited exam. 2.  Osteomyelitis of the first metatarsal stump. 3.  Osteomyelitis of the second toe distal phalanx.    #Rapid Response 10/17   #HAP   - Xray Chest 1 View- PORTABLE-Urgent (Xray Chest 1 View- PORTABLE-Urgent .) (10.17.23 @ 06:10): Worsening pneumonia, left lower lung.    #Buttock decubitus ulcer     #Recently Treated Multifocal PNA    #Down syndrome/Crebral Palsy   #Obesity BMI (kg/m2): 23.7, 26.3  #Abx allergy: No Known Allergies    RECOMMENDATIONS  - ok to continue meropenem 1g q 8 hours given previous colonization in setting of shock   - vancomycin 15 mg/kg x 1   - follow-up blood cx    Please call or message on [employer] Teams if with any questions.  Spectra [phone]

## 2023-10-17 NOTE — AIRWAY PLACEMENT NOTE ADULT - AIRWAY COMMENTS:
Called for intubation.  Patient currently on 100% non-rebreather with O2 sat in the 80's.  BP 80s/40s.  Patient given Phenylephrine 200 mcg and then given Etomidate 8mg and Succinylcholine 40mg.  Laryngoscopy with Glidescope S3, atraumatic intubation with 7.5ETT.  +ETCO2 color change x 6 breaths, bilateral breath sounds and tube secured at 23cm lip line.  Patient remains stable and care as per the managed medical team.

## 2023-10-17 NOTE — DIETITIAN INITIAL EVALUATION ADULT - COLLABORATION WITH OTHER PROVIDERS
Interventions: coordination of care  Monitoring/Evaluation: diet order, SLP f/u, GOC, weight, labs, skin status, NFPE

## 2023-10-17 NOTE — DIETITIAN INITIAL EVALUATION ADULT - NS FNS REASON FOR WEIGHT CHANG
Weight hx per EMR:     55.3 kg - 121.66 lbs (10/2/23)  52.1 kg - 114.62 lbs (8/15/23)  74.4 kg - 163.68 lbs (8/11/23)

## 2023-10-18 LAB
ALBUMIN SERPL ELPH-MCNC: 3.1 G/DL — LOW (ref 3.5–5.2)
ALBUMIN SERPL ELPH-MCNC: 3.1 G/DL — LOW (ref 3.5–5.2)
ALP SERPL-CCNC: 110 U/L — SIGNIFICANT CHANGE UP (ref 30–115)
ALP SERPL-CCNC: 110 U/L — SIGNIFICANT CHANGE UP (ref 30–115)
ALT FLD-CCNC: 21 U/L — SIGNIFICANT CHANGE UP (ref 0–41)
ALT FLD-CCNC: 21 U/L — SIGNIFICANT CHANGE UP (ref 0–41)
ANION GAP SERPL CALC-SCNC: 9 MMOL/L — SIGNIFICANT CHANGE UP (ref 7–14)
ANION GAP SERPL CALC-SCNC: 9 MMOL/L — SIGNIFICANT CHANGE UP (ref 7–14)
AST SERPL-CCNC: 23 U/L — SIGNIFICANT CHANGE UP (ref 0–41)
AST SERPL-CCNC: 23 U/L — SIGNIFICANT CHANGE UP (ref 0–41)
BASOPHILS # BLD AUTO: 0.07 K/UL — SIGNIFICANT CHANGE UP (ref 0–0.2)
BASOPHILS # BLD AUTO: 0.07 K/UL — SIGNIFICANT CHANGE UP (ref 0–0.2)
BASOPHILS NFR BLD AUTO: 0.4 % — SIGNIFICANT CHANGE UP (ref 0–1)
BASOPHILS NFR BLD AUTO: 0.4 % — SIGNIFICANT CHANGE UP (ref 0–1)
BILIRUB SERPL-MCNC: 0.5 MG/DL — SIGNIFICANT CHANGE UP (ref 0.2–1.2)
BILIRUB SERPL-MCNC: 0.5 MG/DL — SIGNIFICANT CHANGE UP (ref 0.2–1.2)
BUN SERPL-MCNC: 12 MG/DL — SIGNIFICANT CHANGE UP (ref 10–20)
BUN SERPL-MCNC: 12 MG/DL — SIGNIFICANT CHANGE UP (ref 10–20)
CALCIUM SERPL-MCNC: 8.4 MG/DL — SIGNIFICANT CHANGE UP (ref 8.4–10.5)
CALCIUM SERPL-MCNC: 8.4 MG/DL — SIGNIFICANT CHANGE UP (ref 8.4–10.5)
CHLORIDE SERPL-SCNC: 103 MMOL/L — SIGNIFICANT CHANGE UP (ref 98–110)
CHLORIDE SERPL-SCNC: 103 MMOL/L — SIGNIFICANT CHANGE UP (ref 98–110)
CO2 SERPL-SCNC: 28 MMOL/L — SIGNIFICANT CHANGE UP (ref 17–32)
CO2 SERPL-SCNC: 28 MMOL/L — SIGNIFICANT CHANGE UP (ref 17–32)
CREAT SERPL-MCNC: 0.7 MG/DL — SIGNIFICANT CHANGE UP (ref 0.7–1.5)
CREAT SERPL-MCNC: 0.7 MG/DL — SIGNIFICANT CHANGE UP (ref 0.7–1.5)
EGFR: 101 ML/MIN/1.73M2 — SIGNIFICANT CHANGE UP
EGFR: 101 ML/MIN/1.73M2 — SIGNIFICANT CHANGE UP
EOSINOPHIL # BLD AUTO: 0.41 K/UL — SIGNIFICANT CHANGE UP (ref 0–0.7)
EOSINOPHIL # BLD AUTO: 0.41 K/UL — SIGNIFICANT CHANGE UP (ref 0–0.7)
EOSINOPHIL NFR BLD AUTO: 2.3 % — SIGNIFICANT CHANGE UP (ref 0–8)
EOSINOPHIL NFR BLD AUTO: 2.3 % — SIGNIFICANT CHANGE UP (ref 0–8)
GAS PNL BLDA: SIGNIFICANT CHANGE UP
GAS PNL BLDA: SIGNIFICANT CHANGE UP
GLUCOSE SERPL-MCNC: 145 MG/DL — HIGH (ref 70–99)
GLUCOSE SERPL-MCNC: 145 MG/DL — HIGH (ref 70–99)
GRAM STN FLD: SIGNIFICANT CHANGE UP
GRAM STN FLD: SIGNIFICANT CHANGE UP
HCT VFR BLD CALC: 33.9 % — LOW (ref 37–47)
HCT VFR BLD CALC: 33.9 % — LOW (ref 37–47)
HGB BLD-MCNC: 10.3 G/DL — LOW (ref 12–16)
HGB BLD-MCNC: 10.3 G/DL — LOW (ref 12–16)
IMM GRANULOCYTES NFR BLD AUTO: 0.4 % — HIGH (ref 0.1–0.3)
IMM GRANULOCYTES NFR BLD AUTO: 0.4 % — HIGH (ref 0.1–0.3)
LYMPHOCYTES # BLD AUTO: 1.28 K/UL — SIGNIFICANT CHANGE UP (ref 1.2–3.4)
LYMPHOCYTES # BLD AUTO: 1.28 K/UL — SIGNIFICANT CHANGE UP (ref 1.2–3.4)
LYMPHOCYTES # BLD AUTO: 7.1 % — LOW (ref 20.5–51.1)
LYMPHOCYTES # BLD AUTO: 7.1 % — LOW (ref 20.5–51.1)
MAGNESIUM SERPL-MCNC: 1.7 MG/DL — LOW (ref 1.8–2.4)
MAGNESIUM SERPL-MCNC: 1.7 MG/DL — LOW (ref 1.8–2.4)
MAGNESIUM SERPL-MCNC: 3 MG/DL — HIGH (ref 1.8–2.4)
MAGNESIUM SERPL-MCNC: 3 MG/DL — HIGH (ref 1.8–2.4)
MCHC RBC-ENTMCNC: 23.9 PG — LOW (ref 27–31)
MCHC RBC-ENTMCNC: 23.9 PG — LOW (ref 27–31)
MCHC RBC-ENTMCNC: 30.4 G/DL — LOW (ref 32–37)
MCHC RBC-ENTMCNC: 30.4 G/DL — LOW (ref 32–37)
MCV RBC AUTO: 78.7 FL — LOW (ref 81–99)
MCV RBC AUTO: 78.7 FL — LOW (ref 81–99)
MONOCYTES # BLD AUTO: 0.47 K/UL — SIGNIFICANT CHANGE UP (ref 0.1–0.6)
MONOCYTES # BLD AUTO: 0.47 K/UL — SIGNIFICANT CHANGE UP (ref 0.1–0.6)
MONOCYTES NFR BLD AUTO: 2.6 % — SIGNIFICANT CHANGE UP (ref 1.7–9.3)
MONOCYTES NFR BLD AUTO: 2.6 % — SIGNIFICANT CHANGE UP (ref 1.7–9.3)
NEUTROPHILS # BLD AUTO: 15.77 K/UL — HIGH (ref 1.4–6.5)
NEUTROPHILS # BLD AUTO: 15.77 K/UL — HIGH (ref 1.4–6.5)
NEUTROPHILS NFR BLD AUTO: 87.2 % — HIGH (ref 42.2–75.2)
NEUTROPHILS NFR BLD AUTO: 87.2 % — HIGH (ref 42.2–75.2)
NRBC # BLD: 0 /100 WBCS — SIGNIFICANT CHANGE UP (ref 0–0)
NRBC # BLD: 0 /100 WBCS — SIGNIFICANT CHANGE UP (ref 0–0)
PLATELET # BLD AUTO: 297 K/UL — SIGNIFICANT CHANGE UP (ref 130–400)
PLATELET # BLD AUTO: 297 K/UL — SIGNIFICANT CHANGE UP (ref 130–400)
PMV BLD: 9.9 FL — SIGNIFICANT CHANGE UP (ref 7.4–10.4)
PMV BLD: 9.9 FL — SIGNIFICANT CHANGE UP (ref 7.4–10.4)
POTASSIUM SERPL-MCNC: 3.9 MMOL/L — SIGNIFICANT CHANGE UP (ref 3.5–5)
POTASSIUM SERPL-MCNC: 3.9 MMOL/L — SIGNIFICANT CHANGE UP (ref 3.5–5)
POTASSIUM SERPL-SCNC: 3.9 MMOL/L — SIGNIFICANT CHANGE UP (ref 3.5–5)
POTASSIUM SERPL-SCNC: 3.9 MMOL/L — SIGNIFICANT CHANGE UP (ref 3.5–5)
PROCALCITONIN SERPL-MCNC: 4.18 NG/ML — HIGH (ref 0.02–0.1)
PROCALCITONIN SERPL-MCNC: 4.18 NG/ML — HIGH (ref 0.02–0.1)
PROCALCITONIN SERPL-MCNC: 4.36 NG/ML — HIGH (ref 0.02–0.1)
PROCALCITONIN SERPL-MCNC: 4.36 NG/ML — HIGH (ref 0.02–0.1)
PROT SERPL-MCNC: 5.8 G/DL — LOW (ref 6–8)
PROT SERPL-MCNC: 5.8 G/DL — LOW (ref 6–8)
RBC # BLD: 4.31 M/UL — SIGNIFICANT CHANGE UP (ref 4.2–5.4)
RBC # BLD: 4.31 M/UL — SIGNIFICANT CHANGE UP (ref 4.2–5.4)
RBC # FLD: 25.4 % — HIGH (ref 11.5–14.5)
RBC # FLD: 25.4 % — HIGH (ref 11.5–14.5)
SODIUM SERPL-SCNC: 140 MMOL/L — SIGNIFICANT CHANGE UP (ref 135–146)
SODIUM SERPL-SCNC: 140 MMOL/L — SIGNIFICANT CHANGE UP (ref 135–146)
SPECIMEN SOURCE: SIGNIFICANT CHANGE UP
SPECIMEN SOURCE: SIGNIFICANT CHANGE UP
WBC # BLD: 18.07 K/UL — HIGH (ref 4.8–10.8)
WBC # BLD: 18.07 K/UL — HIGH (ref 4.8–10.8)
WBC # FLD AUTO: 18.07 K/UL — HIGH (ref 4.8–10.8)
WBC # FLD AUTO: 18.07 K/UL — HIGH (ref 4.8–10.8)

## 2023-10-18 PROCEDURE — 99291 CRITICAL CARE FIRST HOUR: CPT

## 2023-10-18 PROCEDURE — 71045 X-RAY EXAM CHEST 1 VIEW: CPT | Mod: 26

## 2023-10-18 PROCEDURE — 99252 IP/OBS CONSLTJ NEW/EST SF 35: CPT

## 2023-10-18 PROCEDURE — 99232 SBSQ HOSP IP/OBS MODERATE 35: CPT

## 2023-10-18 PROCEDURE — 71275 CT ANGIOGRAPHY CHEST: CPT | Mod: 26

## 2023-10-18 PROCEDURE — 95816 EEG AWAKE AND DROWSY: CPT | Mod: 26

## 2023-10-18 RX ORDER — PANTOPRAZOLE SODIUM 20 MG/1
40 TABLET, DELAYED RELEASE ORAL
Refills: 0 | Status: DISCONTINUED | OUTPATIENT
Start: 2023-10-18 | End: 2023-10-31

## 2023-10-18 RX ORDER — POLYETHYLENE GLYCOL 3350 17 G/17G
17 POWDER, FOR SOLUTION ORAL DAILY
Refills: 0 | Status: DISCONTINUED | OUTPATIENT
Start: 2023-10-18 | End: 2023-11-10

## 2023-10-18 RX ORDER — MAGNESIUM SULFATE 500 MG/ML
2 VIAL (ML) INJECTION ONCE
Refills: 0 | Status: COMPLETED | OUTPATIENT
Start: 2023-10-18 | End: 2023-10-18

## 2023-10-18 RX ORDER — MAGNESIUM SULFATE 500 MG/ML
1 VIAL (ML) INJECTION ONCE
Refills: 0 | Status: DISCONTINUED | OUTPATIENT
Start: 2023-10-18 | End: 2023-10-18

## 2023-10-18 RX ORDER — SENNA PLUS 8.6 MG/1
2 TABLET ORAL AT BEDTIME
Refills: 0 | Status: DISCONTINUED | OUTPATIENT
Start: 2023-10-18 | End: 2023-10-20

## 2023-10-18 RX ADMIN — Medication 1 TABLET(S): at 13:32

## 2023-10-18 RX ADMIN — CHLORHEXIDINE GLUCONATE 1 APPLICATION(S): 213 SOLUTION TOPICAL at 05:41

## 2023-10-18 RX ADMIN — CHLORHEXIDINE GLUCONATE 15 MILLILITER(S): 213 SOLUTION TOPICAL at 05:40

## 2023-10-18 RX ADMIN — MIDODRINE HYDROCHLORIDE 5 MILLIGRAM(S): 2.5 TABLET ORAL at 21:21

## 2023-10-18 RX ADMIN — FENTANYL CITRATE 2.5 MICROGRAM(S)/KG/HR: 50 INJECTION INTRAVENOUS at 21:32

## 2023-10-18 RX ADMIN — FENTANYL CITRATE 2.5 MICROGRAM(S)/KG/HR: 50 INJECTION INTRAVENOUS at 19:17

## 2023-10-18 RX ADMIN — ENOXAPARIN SODIUM 50 MILLIGRAM(S): 100 INJECTION SUBCUTANEOUS at 05:40

## 2023-10-18 RX ADMIN — Medication 2 MILLIGRAM(S): at 02:48

## 2023-10-18 RX ADMIN — RALOXIFENE HYDROCHLORIDE 60 MILLIGRAM(S): 60 TABLET, COATED ORAL at 13:32

## 2023-10-18 RX ADMIN — SENNA PLUS 2 TABLET(S): 8.6 TABLET ORAL at 21:21

## 2023-10-18 RX ADMIN — PANTOPRAZOLE SODIUM 40 MILLIGRAM(S): 20 TABLET, DELAYED RELEASE ORAL at 17:35

## 2023-10-18 RX ADMIN — POLYETHYLENE GLYCOL 3350 17 GRAM(S): 17 POWDER, FOR SOLUTION ORAL at 13:34

## 2023-10-18 RX ADMIN — MEROPENEM 100 MILLIGRAM(S): 1 INJECTION INTRAVENOUS at 01:58

## 2023-10-18 RX ADMIN — Medication 25 GRAM(S): at 19:16

## 2023-10-18 RX ADMIN — MEROPENEM 100 MILLIGRAM(S): 1 INJECTION INTRAVENOUS at 11:02

## 2023-10-18 RX ADMIN — Medication 25 GRAM(S): at 06:40

## 2023-10-18 RX ADMIN — MIDODRINE HYDROCHLORIDE 5 MILLIGRAM(S): 2.5 TABLET ORAL at 13:32

## 2023-10-18 RX ADMIN — Medication 4.68 MICROGRAM(S)/KG/MIN: at 19:17

## 2023-10-18 RX ADMIN — MIDODRINE HYDROCHLORIDE 5 MILLIGRAM(S): 2.5 TABLET ORAL at 05:40

## 2023-10-18 RX ADMIN — MEROPENEM 100 MILLIGRAM(S): 1 INJECTION INTRAVENOUS at 17:34

## 2023-10-18 RX ADMIN — Medication 1200 MILLIGRAM(S): at 05:40

## 2023-10-18 RX ADMIN — ENOXAPARIN SODIUM 50 MILLIGRAM(S): 100 INJECTION SUBCUTANEOUS at 17:34

## 2023-10-18 RX ADMIN — GABAPENTIN 600 MILLIGRAM(S): 400 CAPSULE ORAL at 13:32

## 2023-10-18 RX ADMIN — CHLORHEXIDINE GLUCONATE 15 MILLILITER(S): 213 SOLUTION TOPICAL at 17:35

## 2023-10-18 NOTE — PROGRESS NOTE ADULT - SUBJECTIVE AND OBJECTIVE BOX
Over Night Events: events noted, remain critically ill, still intubated, ventilated, low grade fever, on pressors, LE doppler noted    PHYSICAL EXAM    ICU Vital Signs Last 24 Hrs  T(C): 36.9 (18 Oct 2023 04:00), Max: 38.5 (17 Oct 2023 09:09)  T(F): 98.4 (18 Oct 2023 04:00), Max: 101.3 (17 Oct 2023 09:09)  HR: 55 (18 Oct 2023 06:30) (51 - 107)  BP: 96/51 (18 Oct 2023 06:30) (83/55 - 141/65)  BP(mean): 71 (18 Oct 2023 06:30) (64 - 94)  RR: 14 (18 Oct 2023 06:30) (14 - 29)  SpO2: 97% (18 Oct 2023 06:30) (90% - 100%)    O2 Parameters below as of 17 Oct 2023 20:00      O2 Concentration (%): 60        General: ill looking  ETT  Lungs: Bilateral rhonchi  Cardiovascular: HARPREET 2/6  Abdomen: Soft, Positive BS  Extremities: No clubbing   Not following commands  foot ulcer      10-16-23 @ 07:01  -  10-17-23 @ 07:00  --------------------------------------------------------  IN:    Oral Fluid: 358 mL  Total IN: 358 mL    OUT:    Voided (mL): 1080 mL  Total OUT: 1080 mL    Total NET: -722 mL      10-17-23 @ 07:01  -  10-18-23 @ 06:56  --------------------------------------------------------  IN:    Dexmedetomidine: 50.4 mL    Enteral Tube Flush: 100 mL    FentaNYL: 101 mL    IV PiggyBack: 300 mL    Lactated Ringers Bolus: 750 mL    Norepinephrine: 175.7 mL  Total IN: 1477.1 mL    OUT:    Indwelling Catheter - Urethral (mL): 850 mL  Total OUT: 850 mL    Total NET: 627.1 mL          LABS:                          10.3   18.07 )-----------( 297      ( 18 Oct 2023 05:10 )             33.9                                               10-18    140  |  103  |  12  ----------------------------<  145<H>  3.9   |  28  |  0.7    Ca    8.4      18 Oct 2023 05:10  Phos  3.1     10-17  Mg     1.7     10-18    TPro  5.8<L>  /  Alb  3.1<L>  /  TBili  0.5  /  DBili  x   /  AST  23  /  ALT  21  /  AlkPhos  110  10-18      PT/INR - ( 17 Oct 2023 12:35 )   PT: 12.50 sec;   INR: 1.09 ratio         PTT - ( 17 Oct 2023 12:35 )  PTT:22.9 sec                                       Urinalysis Basic - ( 18 Oct 2023 05:10 )    Color: x / Appearance: x / SG: x / pH: x  Gluc: 145 mg/dL / Ketone: x  / Bili: x / Urobili: x   Blood: x / Protein: x / Nitrite: x   Leuk Esterase: x / RBC: x / WBC x   Sq Epi: x / Non Sq Epi: x / Bacteria: x        CARDIAC MARKERS ( 17 Oct 2023 12:35 )  x     / 0.01 ng/mL / 31 U/L / x     / x                                                LIVER FUNCTIONS - ( 18 Oct 2023 05:10 )  Alb: 3.1 g/dL / Pro: 5.8 g/dL / ALK PHOS: 110 U/L / ALT: 21 U/L / AST: 23 U/L / GGT: x                                                  Culture - Sputum (collected 17 Oct 2023 19:37)  Source: Trach Asp Tracheal Aspirate  Gram Stain (18 Oct 2023 02:39):    Numerous polymorphonuclear leukocytes per low power field    No Squamous epithelial cells per low power field    Moderate Yeast like cells seen per oil power field                                                   Mode: AC/ CMV (Assist Control/ Continuous Mandatory Ventilation)  RR (machine): 14  TV (machine): 300  FiO2: 60  PEEP: 8  ITime: 1  MAP: 11  PIP: 24                                      ABG - ( 18 Oct 2023 03:55 )  pH, Arterial: 7.38  pH, Blood: x     /  pCO2: 48    /  pO2: 90    / HCO3: 28    / Base Excess: 2.7   /  SaO2: 97.9                MEDICATIONS  (STANDING):  chlorhexidine 0.12% Liquid 15 milliLiter(s) Oral Mucosa every 12 hours  chlorhexidine 2% Cloths 1 Application(s) Topical <User Schedule>  dexMEDEtomidine Infusion 0.5 MICROgram(s)/kG/Hr (6.24 mL/Hr) IV Continuous <Continuous>  enoxaparin Injectable 50 milliGRAM(s) SubCutaneous every 12 hours  fentaNYL   Infusion. 0.5 MICROgram(s)/kG/Hr (2.5 mL/Hr) IV Continuous <Continuous>  gabapentin 600 milliGRAM(s) Oral daily  guaiFENesin ER 1200 milliGRAM(s) Oral every 12 hours  melatonin 5 milliGRAM(s) Oral at bedtime  meropenem  IVPB 1000 milliGRAM(s) IV Intermittent every 8 hours  midodrine 5 milliGRAM(s) Oral every 8 hours  multivitamin 1 Tablet(s) Oral daily  norepinephrine Infusion 0.05 MICROgram(s)/kG/Min (4.68 mL/Hr) IV Continuous <Continuous>  pantoprazole    Tablet 40 milliGRAM(s) Oral two times a day  raloxifene 60 milliGRAM(s) Oral daily     Over Night Events: events noted, remain critically ill, still intubated, ventilated, low grade fever, on pressors, LE doppler noted, levophed a.1, fentanyl and precedex    PHYSICAL EXAM    ICU Vital Signs Last 24 Hrs  T(C): 36.9 (18 Oct 2023 04:00), Max: 38.5 (17 Oct 2023 09:09)  T(F): 98.4 (18 Oct 2023 04:00), Max: 101.3 (17 Oct 2023 09:09)  HR: 55 (18 Oct 2023 06:30) (51 - 107)  BP: 96/51 (18 Oct 2023 06:30) (83/55 - 141/65)  BP(mean): 71 (18 Oct 2023 06:30) (64 - 94)  RR: 14 (18 Oct 2023 06:30) (14 - 29)  SpO2: 97% (18 Oct 2023 06:30) (90% - 100%)    O2 Parameters below as of 17 Oct 2023 20:00      O2 Concentration (%): 60        General: ill looking  ETT  Lungs: Bilateral rhonchi  Cardiovascular: HARPREET 2/6  Abdomen: Soft, Positive BS  Extremities: No clubbing   Not following commands  foot ulcer      10-16-23 @ 07:01  -  10-17-23 @ 07:00  --------------------------------------------------------  IN:    Oral Fluid: 358 mL  Total IN: 358 mL    OUT:    Voided (mL): 1080 mL  Total OUT: 1080 mL    Total NET: -722 mL      10-17-23 @ 07:01  -  10-18-23 @ 06:56  --------------------------------------------------------  IN:    Dexmedetomidine: 50.4 mL    Enteral Tube Flush: 100 mL    FentaNYL: 101 mL    IV PiggyBack: 300 mL    Lactated Ringers Bolus: 750 mL    Norepinephrine: 175.7 mL  Total IN: 1477.1 mL    OUT:    Indwelling Catheter - Urethral (mL): 850 mL  Total OUT: 850 mL    Total NET: 627.1 mL          LABS:                          10.3   18.07 )-----------( 297      ( 18 Oct 2023 05:10 )             33.9                                               10-18    140  |  103  |  12  ----------------------------<  145<H>  3.9   |  28  |  0.7    Ca    8.4      18 Oct 2023 05:10  Phos  3.1     10-17  Mg     1.7     10-18    TPro  5.8<L>  /  Alb  3.1<L>  /  TBili  0.5  /  DBili  x   /  AST  23  /  ALT  21  /  AlkPhos  110  10-18      PT/INR - ( 17 Oct 2023 12:35 )   PT: 12.50 sec;   INR: 1.09 ratio         PTT - ( 17 Oct 2023 12:35 )  PTT:22.9 sec                                       Urinalysis Basic - ( 18 Oct 2023 05:10 )    Color: x / Appearance: x / SG: x / pH: x  Gluc: 145 mg/dL / Ketone: x  / Bili: x / Urobili: x   Blood: x / Protein: x / Nitrite: x   Leuk Esterase: x / RBC: x / WBC x   Sq Epi: x / Non Sq Epi: x / Bacteria: x        CARDIAC MARKERS ( 17 Oct 2023 12:35 )  x     / 0.01 ng/mL / 31 U/L / x     / x                                                LIVER FUNCTIONS - ( 18 Oct 2023 05:10 )  Alb: 3.1 g/dL / Pro: 5.8 g/dL / ALK PHOS: 110 U/L / ALT: 21 U/L / AST: 23 U/L / GGT: x                                                  Culture - Sputum (collected 17 Oct 2023 19:37)  Source: Trach Asp Tracheal Aspirate  Gram Stain (18 Oct 2023 02:39):    Numerous polymorphonuclear leukocytes per low power field    No Squamous epithelial cells per low power field    Moderate Yeast like cells seen per oil power field                                                   Mode: AC/ CMV (Assist Control/ Continuous Mandatory Ventilation)  RR (machine): 14  TV (machine): 300  FiO2: 60  PEEP: 8  ITime: 1  MAP: 11  PIP: 24                                      ABG - ( 18 Oct 2023 03:55 )  pH, Arterial: 7.38  pH, Blood: x     /  pCO2: 48    /  pO2: 90    / HCO3: 28    / Base Excess: 2.7   /  SaO2: 97.9                MEDICATIONS  (STANDING):  chlorhexidine 0.12% Liquid 15 milliLiter(s) Oral Mucosa every 12 hours  chlorhexidine 2% Cloths 1 Application(s) Topical <User Schedule>  dexMEDEtomidine Infusion 0.5 MICROgram(s)/kG/Hr (6.24 mL/Hr) IV Continuous <Continuous>  enoxaparin Injectable 50 milliGRAM(s) SubCutaneous every 12 hours  fentaNYL   Infusion. 0.5 MICROgram(s)/kG/Hr (2.5 mL/Hr) IV Continuous <Continuous>  gabapentin 600 milliGRAM(s) Oral daily  guaiFENesin ER 1200 milliGRAM(s) Oral every 12 hours  melatonin 5 milliGRAM(s) Oral at bedtime  meropenem  IVPB 1000 milliGRAM(s) IV Intermittent every 8 hours  midodrine 5 milliGRAM(s) Oral every 8 hours  multivitamin 1 Tablet(s) Oral daily  norepinephrine Infusion 0.05 MICROgram(s)/kG/Min (4.68 mL/Hr) IV Continuous <Continuous>  pantoprazole    Tablet 40 milliGRAM(s) Oral two times a day  raloxifene 60 milliGRAM(s) Oral daily

## 2023-10-18 NOTE — PROGRESS NOTE ADULT - ASSESSMENT
This 58y old  F with PMHx of PMHx of possible seizures? (not on any AED), Down syndrome, nonverbal at baseline, hypothyroidism, cerebral palsy, congenital pulmonary stenosis who presented for upper body shaking and syncope episode. In the setting of bradycardia, severe hypotension and  lack of urinary/bowel incontinence or tongue bitting makes seizures unlikely. Would be more concern about hypotension leading to low perfusion in the brain which may cause shaking. She had another episode overnight    Recommendations:   - vEEG   - No ASM for now, will wait for EEG results   - Ativan 2mg IV for GTCs > 2 min only  - Neurological assessment Q8hrs  - Seizure & Fall precautions  - Maintain Mg> 2 mmol/l

## 2023-10-18 NOTE — PROGRESS NOTE ADULT - SUBJECTIVE AND OBJECTIVE BOX
Neurology Progress Note    Interval History:  The patient was seen and examined at the bedside. There was an episode of seizure like activity overnight for about 30 seconds, with up rolling of the eyes as per medical staff/primary team.       PAST MEDICAL & SURGICAL HISTORY:  Down syndrome    Osteoporosis    Mild anemia    Neuropathy    S/P debridement  of R hip on 3/2/21            Medications:  chlorhexidine 0.12% Liquid 15 milliLiter(s) Oral Mucosa every 12 hours  chlorhexidine 2% Cloths 1 Application(s) Topical <User Schedule>  dexMEDEtomidine Infusion 0.5 MICROgram(s)/kG/Hr IV Continuous <Continuous>  enoxaparin Injectable 50 milliGRAM(s) SubCutaneous every 12 hours  fentaNYL   Infusion. 0.5 MICROgram(s)/kG/Hr IV Continuous <Continuous>  gabapentin 600 milliGRAM(s) Oral daily  magnesium sulfate  IVPB 2 Gram(s) IV Intermittent once  meropenem  IVPB 1000 milliGRAM(s) IV Intermittent every 8 hours  midodrine 5 milliGRAM(s) Oral every 8 hours  multivitamin 1 Tablet(s) Oral daily  norepinephrine Infusion 0.05 MICROgram(s)/kG/Min IV Continuous <Continuous>  pantoprazole   Suspension 40 milliGRAM(s) Oral two times a day  polyethylene glycol 3350 17 Gram(s) Oral daily  raloxifene 60 milliGRAM(s) Oral daily  senna 2 Tablet(s) Oral at bedtime      Vital Signs Last 24 Hrs  T(C): 37.4 (18 Oct 2023 16:00), Max: 37.5 (18 Oct 2023 08:00)  T(F): 99.4 (18 Oct 2023 16:00), Max: 99.5 (18 Oct 2023 08:00)  HR: 66 (18 Oct 2023 18:00) (51 - 107)  BP: 105/53 (18 Oct 2023 18:00) (83/55 - 120/57)  BP(mean): 73 (18 Oct 2023 18:00) (63 - 82)  RR: 19 (18 Oct 2023 18:00) (14 - 20)  SpO2: 95% (18 Oct 2023 18:00) (95% - 99%)    Parameters below as of 17 Oct 2023 20:00      O2 Concentration (%): 60    Neurological Exam:   Mental status: intubated, on sedation. She is not able to follow any commands, (nonverbal at baseline).   Cranial nerves: Pupils equally round and reactive to light, no nystagmus, medial deviation of the Rt eye (baseline as per medical staff), face symmetric.   Motor: she move all her limbs for noxious stimuli. No abnormal movements. Contractures in all 4 limbs.   Sensation: react to noxious stimuli all 4 limbs   Reflexes: 1+ in bilateral UE/LE      Labs:  CBC Full  -  ( 18 Oct 2023 05:10 )  WBC Count : 18.07 K/uL  RBC Count : 4.31 M/uL  Hemoglobin : 10.3 g/dL  Hematocrit : 33.9 %  Platelet Count - Automated : 297 K/uL  Mean Cell Volume : 78.7 fL  Mean Cell Hemoglobin : 23.9 pg  Mean Cell Hemoglobin Concentration : 30.4 g/dL  Auto Neutrophil # : 15.77 K/uL  Auto Lymphocyte # : 1.28 K/uL  Auto Monocyte # : 0.47 K/uL  Auto Eosinophil # : 0.41 K/uL  Auto Basophil # : 0.07 K/uL  Auto Neutrophil % : 87.2 %  Auto Lymphocyte % : 7.1 %  Auto Monocyte % : 2.6 %  Auto Eosinophil % : 2.3 %  Auto Basophil % : 0.4 %    10-18    140  |  103  |  12  ----------------------------<  145<H>  3.9   |  28  |  0.7    Ca    8.4      18 Oct 2023 05:10  Phos  3.1     10-17  Mg     1.7     10-18    TPro  5.8<L>  /  Alb  3.1<L>  /  TBili  0.5  /  DBili  x   /  AST  23  /  ALT  21  /  AlkPhos  110  10-18    LIVER FUNCTIONS - ( 18 Oct 2023 05:10 )  Alb: 3.1 g/dL / Pro: 5.8 g/dL / ALK PHOS: 110 U/L / ALT: 21 U/L / AST: 23 U/L / GGT: x           PT/INR - ( 17 Oct 2023 12:35 )   PT: 12.50 sec;   INR: 1.09 ratio         PTT - ( 17 Oct 2023 12:35 )  PTT:22.9 sec  Urinalysis Basic - ( 18 Oct 2023 05:10 )    Color: x / Appearance: x / SG: x / pH: x  Gluc: 145 mg/dL / Ketone: x  / Bili: x / Urobili: x   Blood: x / Protein: x / Nitrite: x   Leuk Esterase: x / RBC: x / WBC x   Sq Epi: x / Non Sq Epi: x / Bacteria: x

## 2023-10-18 NOTE — PROGRESS NOTE ADULT - SUBJECTIVE AND OBJECTIVE BOX
24H events:    Patient is a 58y old Female who presents with a chief complaint of Pre-syncope (18 Oct 2023 06:55)    Primary diagnosis of Pre-syncope      Day 1:  Day 2:  Day 3:     Today is hospital day 4d. This morning patient was seen and examined at bedside, resting comfortably in bed.    Overnight - pt noted to have DVT in left common fem, therapeutic Lovenox started.  Patient had episode of questionable seizure activity, 30 sec of rhythmic body shaking, left arm jerking, abnormal eye movements, 2mg ativan given.     Code Status:    Family communication:  Contact date:  Name of person contacted:  Relationship to patient:  Communication details:  What matters most:    PAST MEDICAL & SURGICAL HISTORY  Down syndrome    Osteoporosis    Mild anemia    Neuropathy    S/P debridement  of R hip on 3/2/21      SOCIAL HISTORY:  Social History:  Lives at nursing home (14 Oct 2023 20:33)      ALLERGIES:  No Known Allergies    MEDICATIONS:  STANDING MEDICATIONS  chlorhexidine 0.12% Liquid 15 milliLiter(s) Oral Mucosa every 12 hours  chlorhexidine 2% Cloths 1 Application(s) Topical <User Schedule>  dexMEDEtomidine Infusion 0.5 MICROgram(s)/kG/Hr IV Continuous <Continuous>  enoxaparin Injectable 50 milliGRAM(s) SubCutaneous every 12 hours  fentaNYL   Infusion. 0.5 MICROgram(s)/kG/Hr IV Continuous <Continuous>  gabapentin 600 milliGRAM(s) Oral daily  guaiFENesin ER 1200 milliGRAM(s) Oral every 12 hours  melatonin 5 milliGRAM(s) Oral at bedtime  meropenem  IVPB 1000 milliGRAM(s) IV Intermittent every 8 hours  midodrine 5 milliGRAM(s) Oral every 8 hours  multivitamin 1 Tablet(s) Oral daily  norepinephrine Infusion 0.05 MICROgram(s)/kG/Min IV Continuous <Continuous>  pantoprazole    Tablet 40 milliGRAM(s) Oral two times a day  raloxifene 60 milliGRAM(s) Oral daily    PRN MEDICATIONS    VITALS:   T(F): 98.4  HR: 51  BP: 91/51  RR: 14  SpO2: 98%    PHYSICAL EXAM:  GENERAL: intubated and sedated  ( x ) NAD, lying in bed comfortably     (  ) obtunded     (  ) lethargic     (  ) somnolent    HEAD:   ( x ) Atraumatic     (  ) hematoma     (  ) laceration (specify location:       )     NECK:  ( x ) Supple     (  ) neck stiffness     (  ) nuchal rigidity     (  )  no JVD     (  ) JVD present ( -- cm)    HEART:  Rate -->     ( x ) normal rate     (  ) bradycardic     (  ) tachycardic  Rhythm -->     (  x) regular     (  ) regularly irregular     (  ) irregularly irregular  Murmurs -->     (  ) normal s1s2     (  ) systolic murmur     (  ) diastolic murmur     (  ) continuous murmur      (  ) S3 present     (  ) S4 present    LUNGS:   (x  )Unlabored respirations     (  ) tachypnea  ( x ) B/L air entry     (  ) decreased breath sounds in:  (location     )    (  ) no adventitious sound     (  ) crackles     (  ) wheezing      (  ) rhonchi      (specify location:       )  (  ) chest wall tenderness (specify location:       )    ABDOMEN:   (x  ) Soft     (  ) tense   |   (x  ) nondistended     (  ) distended   |   (  ) +BS     (  ) hypoactive bowel sounds     (  ) hyperactive bowel sounds  ( x ) nontender     (  ) RUQ tenderness     (  ) RLQ tenderness     (  ) LLQ tenderness     (  ) epigastric tenderness     (  ) diffuse tenderness  (  ) Splenomegaly      (  ) Hepatomegaly      (  ) Jaundice     (  ) ecchymosis     EXTREMITIES: contracted   (  ) Normal     (  ) Rash     (  ) ecchymosis     (  ) varicose veins      (  ) pitting edema     (  ) non-pitting edema   (  ) ulceration     (  ) gangrene:     (location:     )    NERVOUS SYSTEM:  intubated and sedated  (  ) A&Ox3     (  ) confused     (  ) lethargic  CN II-XII:     (  ) Intact     (  ) deficits found     (Specify:     )   Upper extremities:     (  ) no sensorimotor deficits     (  ) weakness     (  ) loss of proprioception/vibration     (  ) loss of touch/temperature (specify:    )  Lower extremities:     (  ) no sensorimotor deficits     (  ) weakness     (  ) loss of proprioception/vibration     (  ) loss of touch/temperature (specify:    )    SKIN:   (  ) No rashes or lesions     (  ) maculopapular rash     (  ) pustules     (  ) vesicles     (  ) ulcer     (  ) ecchymosis     (specify location:     )    AMPAC score:    ( x ) Indwelling Madsen Catheter:   Date insterted:  10/17  Reason (  ) Critical illness     ( x ) urinary retention    (  ) Accurate Ins/Outs Monitoring     (  ) CMO patient    ( x ) Central Line:   Date inserted: 10/17  Location: (x  ) Right IJ     (  ) Left IJ     (  ) Right Fem     (  ) Left Fem    (  ) SPC        (  ) pigtail       (  ) PEG tube       (  ) colostomy       (  ) jejunostomy  (  ) U-Dall    LABS:                        10.3   18.07 )-----------( 297      ( 18 Oct 2023 05:10 )             33.9     10-18    140  |  103  |  12  ----------------------------<  145<H>  3.9   |  28  |  0.7    Ca    8.4      18 Oct 2023 05:10  Phos  3.1     10-17  Mg     1.7     10-18    TPro  5.8<L>  /  Alb  3.1<L>  /  TBili  0.5  /  DBili  x   /  AST  23  /  ALT  21  /  AlkPhos  110  10-18    PT/INR - ( 17 Oct 2023 12:35 )   PT: 12.50 sec;   INR: 1.09 ratio         PTT - ( 17 Oct 2023 12:35 )  PTT:22.9 sec  Urinalysis Basic - ( 18 Oct 2023 05:10 )    Color: x / Appearance: x / SG: x / pH: x  Gluc: 145 mg/dL / Ketone: x  / Bili: x / Urobili: x   Blood: x / Protein: x / Nitrite: x   Leuk Esterase: x / RBC: x / WBC x   Sq Epi: x / Non Sq Epi: x / Bacteria: x      ABG - ( 18 Oct 2023 03:55 )  pH, Arterial: 7.38  pH, Blood: x     /  pCO2: 48    /  pO2: 90    / HCO3: 28    / Base Excess: 2.7   /  SaO2: 97.9              Creatine Kinase, Serum: 31 U/L (10-17-23 @ 12:35)  Troponin T, Serum: 0.01 ng/mL (10-17-23 @ 12:35)      Culture - Sputum (collected 17 Oct 2023 19:37)  Source: Trach Asp Tracheal Aspirate  Gram Stain (18 Oct 2023 02:39):    Numerous polymorphonuclear leukocytes per low power field    No Squamous epithelial cells per low power field    Moderate Yeast like cells seen per oil power field      CARDIAC MARKERS ( 17 Oct 2023 12:35 )  x     / 0.01 ng/mL / 31 U/L / x     / x          RADIOLOGY:

## 2023-10-18 NOTE — ADVANCED PRACTICE NURSE CONSULT - RECOMMEDATIONS
Cleanse bilateral buttocks with soap and water.   Pat dry apply triad and Allevyn twice a day and prn for soiling.     Cleanse wounds to left foot with soap and water  Pat dry, apply Cavilon, cover with Allevyn twice a day, prn for soiling  Recommend follow up with Podiatry for further recommendations    Maintain pressure injury prevention.   Keep skin clean.   Maintain incontinence care.   Monitor wound for changes and notify provider   Case discussed with primary RN Cleanse bilateral buttocks with soap and water.   Pat dry apply triad and Allevyn twice a day and prn for soiling.     Cleanse wounds to left foot with soap and water  Pat dry, apply Cavilon, cover with Allevyn twice a day, prn for soiling  Recommend follow up with Podiatry for further recommendations    Maintain pressure injury prevention.   Keep skin clean.   Maintain incontinence care.   Monitor wound for changes and notify provider   Case discussed with primary RN   Patient sen during rounds agreeable with above

## 2023-10-18 NOTE — PROGRESS NOTE ADULT - ASSESSMENT
IMPRESSION:    Acute hypoxemic respiratory failure sp intubation  DVT RO PE  HO recent duodenal perforation   Aspiration pneumonia  foot OM/ MRI noted  HO polymicrobial bacteremia   H/o CP  H/o seizures    PLAN:    CNS: PRN sedation.  CTH and EEG.      HEENT: Oral care.  ET care     PULMONARY: HOB at 45 degrees.  Aspiration precaution. dec FIO2 keep SaO2 92 TO 96%  SaO2 92 TO 96%.  DTA.  Monitor PPL and DP  CT angio    CARDIOVASCULAR: Goal directed fluid resuscitation.      GI: Protonix BID.  OG feeding.  Bowel regimen     RENAL:    FU lytes.  Correct as needed.      INFECTIOUS DISEASE:  ABX PER ID    HEMATOLOGICAL: DVT prophylaxis seq.  Monitor CBC.  Dimer and Duplex.      ENDOCRINE:  Follow up FS.  Insulin protocol if needed.    MUSCULOSKELETAL: Bedrest.  Off loading.  Wound care.      Prognosis guarded.      MICU    IMPRESSION:    Acute hypoxemic respiratory failure sp intubation  DVT RO PE  HO recent duodenal perforation   Aspiration pneumonia  foot OM/ MRI noted  HO polymicrobial bacteremia   H/o CP  H/o seizures    PLAN:    CNS: PRN sedation.  CTH - and EEG.  DC Precedex, Neuro fup    HEENT: Oral care.  ET care     PULMONARY: HOB at 45 degrees.  Aspiration precaution.  SaO2 92 TO 96%.  DTA.  Monitor PPL and DP  CT angio    CARDIOVASCULAR: Goal directed fluid resuscitation.  taper pressors    GI: Protonix BID.  OG feeding.  Bowel regimen     RENAL:    FU lytes.  Correct as needed.      INFECTIOUS DISEASE:  ABX PER ID    HEMATOLOGICAL: DVT prophylaxis seq.  Monitor CBC. lovenox therapeutic    ENDOCRINE:  Follow up FS.  Insulin protocol if needed.    MUSCULOSKELETAL: Bedrest.  Off loading.  Wound care.      Prognosis guarded.      [facility] 10/17

## 2023-10-18 NOTE — ADVANCED PRACTICE NURSE CONSULT - ASSESSMENT
History of Present Illness:   Patient is a 58 year old female with a PMHx of Down syndrome, nonverbal at baseline, hypothyroidism, cerebral palsy, congenital pulmonary stenosis, Seizures, presents to the ED from nursing facility for syncope associated with tonic-clonic movement witnessed by aide. Initial vitals at nursing home showed bradycardia and hypoxia for about 10-15 minutes. No fevers, chill, cough, vomiting, diarrhea, difficulty breathing.     Hospital course 9/29/2023 -> 10/13/2023 Patient was admitted  for hemoptysis and duodenal perforation requiring intubation and ICU admission for hypovolemic and septic shock requiring temporary pressor support, which resolved. Patient also had acute hypoxemic respiratory failure likely due to multifocal pneumonia. Patient was successfully extubated and completed a course of antibiotics per ID, further infectious workup was unremarkable. Repeat CTAP with contrast showed no contrast extravasation and previous seen foci of retroperitoneal gas no longer identified. The patient was evaluated by surgery and GI, and no acute intervention recommended. Patient with stable hemoglobin. Hospital course complicated by NELA likely prerenal which resolved and NSTEMI likely type II due to shock (Echo 9/30 EF 55 to 60%, Normal left systolic function Diastolic function could not be assessed) with subsequent downtrending trops (0.34 --> 0.11). Patient's hospital course ICU --> Stepdown --> General medical floor. Patient remains hemodynamically stable on general medical, tolerating pureed feeds, and has bowel movements. Patient was discharged on 10/13/2023     Allergies and Intolerances:        Allergies:  	No Known Allergies:     Home Medications:   * Patient Currently Takes Medications as of 14-Oct-2023 21:04 documented in Structured Notes  · 	midodrine 5 mg oral tablet: Last Dose Taken:  , 1 tab(s) orally every 8 hours  · 	Protonix 40 mg oral delayed release tablet: Last Dose Taken:  , 1 tab(s) orally 2 times a day Protonix 40 mg two times daily for 2 months then once daily thereafter  · 	Multiple Vitamins oral tablet: Last Dose Taken:  , 1 tab(s) orally once a day  · 	melatonin 5 mg oral tablet: Last Dose Taken:  , 1 tab(s) orally once a day (at bedtime)  · 	gabapentin 600 mg oral tablet: Last Dose Taken:  , 1 orally once a day  · 	raloxifene 60 mg oral tablet: Last Dose Taken:  , 1 tab(s) orally once a day    Patient History:    Past Medical, Past Surgical, and Family History:  PAST MEDICAL HISTORY:  Down syndrome   Mild anemia   Neuropathy   Osteoporosis.    PAST SURGICAL HISTORY:  S/P debridement of R hip on 3/2/21.     Social History:  · Substance use	No  · Social History (marital status, living situation, occupation, and sexual history)	Lives at nursing home    Referred to picture in chart. Intubated and sedated. Limited mobility. Madsen in place. On pressors. High risk for pressure injury.    Wound #1  Type of Wound: Friction and Moisture Associated Skin Damage  Location: Bilateral buttocks  Tunneling /Undermining: No  Wound bed: Pink denuded skin to left buttock. Hyperpigmented skin to right buttock  Wound edges: Macerated  Periwound: Hyperpigmented    Wound #2  Multiple deep tissue injuries to left foot. Blood filled blisters.     Podiatry following for wound to right foot.

## 2023-10-19 LAB
1,3 BETA GLUCAN SER QL: POSITIVE
1,3 BETA GLUCAN SER QL: POSITIVE
ALBUMIN SERPL ELPH-MCNC: 2.7 G/DL — LOW (ref 3.5–5.2)
ALBUMIN SERPL ELPH-MCNC: 2.7 G/DL — LOW (ref 3.5–5.2)
ALP SERPL-CCNC: 117 U/L — HIGH (ref 30–115)
ALP SERPL-CCNC: 117 U/L — HIGH (ref 30–115)
ALT FLD-CCNC: 15 U/L — SIGNIFICANT CHANGE UP (ref 0–41)
ALT FLD-CCNC: 15 U/L — SIGNIFICANT CHANGE UP (ref 0–41)
ANION GAP SERPL CALC-SCNC: 7 MMOL/L — SIGNIFICANT CHANGE UP (ref 7–14)
ANION GAP SERPL CALC-SCNC: 7 MMOL/L — SIGNIFICANT CHANGE UP (ref 7–14)
AST SERPL-CCNC: 19 U/L — SIGNIFICANT CHANGE UP (ref 0–41)
AST SERPL-CCNC: 19 U/L — SIGNIFICANT CHANGE UP (ref 0–41)
BASOPHILS # BLD AUTO: 0.06 K/UL — SIGNIFICANT CHANGE UP (ref 0–0.2)
BASOPHILS NFR BLD AUTO: 0.4 % — SIGNIFICANT CHANGE UP (ref 0–1)
BASOPHILS NFR BLD AUTO: 0.4 % — SIGNIFICANT CHANGE UP (ref 0–1)
BASOPHILS NFR BLD AUTO: 0.5 % — SIGNIFICANT CHANGE UP (ref 0–1)
BASOPHILS NFR BLD AUTO: 0.5 % — SIGNIFICANT CHANGE UP (ref 0–1)
BILIRUB SERPL-MCNC: 0.3 MG/DL — SIGNIFICANT CHANGE UP (ref 0.2–1.2)
BILIRUB SERPL-MCNC: 0.3 MG/DL — SIGNIFICANT CHANGE UP (ref 0.2–1.2)
BUN SERPL-MCNC: 6 MG/DL — LOW (ref 10–20)
BUN SERPL-MCNC: 6 MG/DL — LOW (ref 10–20)
CALCIUM SERPL-MCNC: 7.7 MG/DL — LOW (ref 8.4–10.4)
CALCIUM SERPL-MCNC: 7.7 MG/DL — LOW (ref 8.4–10.4)
CHLORIDE SERPL-SCNC: 97 MMOL/L — LOW (ref 98–110)
CHLORIDE SERPL-SCNC: 97 MMOL/L — LOW (ref 98–110)
CO2 SERPL-SCNC: 30 MMOL/L — SIGNIFICANT CHANGE UP (ref 17–32)
CO2 SERPL-SCNC: 30 MMOL/L — SIGNIFICANT CHANGE UP (ref 17–32)
CREAT SERPL-MCNC: 0.5 MG/DL — LOW (ref 0.7–1.5)
CREAT SERPL-MCNC: 0.5 MG/DL — LOW (ref 0.7–1.5)
CULTURE RESULTS: NO GROWTH — SIGNIFICANT CHANGE UP
CULTURE RESULTS: NO GROWTH — SIGNIFICANT CHANGE UP
CULTURE RESULTS: SIGNIFICANT CHANGE UP
CULTURE RESULTS: SIGNIFICANT CHANGE UP
EGFR: 109 ML/MIN/1.73M2 — SIGNIFICANT CHANGE UP
EGFR: 109 ML/MIN/1.73M2 — SIGNIFICANT CHANGE UP
EOSINOPHIL # BLD AUTO: 0.4 K/UL — SIGNIFICANT CHANGE UP (ref 0–0.7)
EOSINOPHIL # BLD AUTO: 0.4 K/UL — SIGNIFICANT CHANGE UP (ref 0–0.7)
EOSINOPHIL # BLD AUTO: 0.41 K/UL — SIGNIFICANT CHANGE UP (ref 0–0.7)
EOSINOPHIL # BLD AUTO: 0.41 K/UL — SIGNIFICANT CHANGE UP (ref 0–0.7)
EOSINOPHIL NFR BLD AUTO: 3 % — SIGNIFICANT CHANGE UP (ref 0–8)
EOSINOPHIL NFR BLD AUTO: 3 % — SIGNIFICANT CHANGE UP (ref 0–8)
EOSINOPHIL NFR BLD AUTO: 3.1 % — SIGNIFICANT CHANGE UP (ref 0–8)
EOSINOPHIL NFR BLD AUTO: 3.1 % — SIGNIFICANT CHANGE UP (ref 0–8)
FUNGITELL: >500 PG/ML
FUNGITELL: >500 PG/ML
GAS PNL BLDA: SIGNIFICANT CHANGE UP
GAS PNL BLDA: SIGNIFICANT CHANGE UP
GLUCOSE SERPL-MCNC: 128 MG/DL — HIGH (ref 70–99)
GLUCOSE SERPL-MCNC: 128 MG/DL — HIGH (ref 70–99)
HCT VFR BLD CALC: 31.6 % — LOW (ref 37–47)
HCT VFR BLD CALC: 31.6 % — LOW (ref 37–47)
HCT VFR BLD CALC: 31.7 % — LOW (ref 37–47)
HCT VFR BLD CALC: 31.7 % — LOW (ref 37–47)
HGB BLD-MCNC: 9.7 G/DL — LOW (ref 12–16)
HGB BLD-MCNC: 9.7 G/DL — LOW (ref 12–16)
HGB BLD-MCNC: 9.8 G/DL — LOW (ref 12–16)
HGB BLD-MCNC: 9.8 G/DL — LOW (ref 12–16)
IMM GRANULOCYTES NFR BLD AUTO: 0.6 % — HIGH (ref 0.1–0.3)
IMM GRANULOCYTES NFR BLD AUTO: 0.6 % — HIGH (ref 0.1–0.3)
IMM GRANULOCYTES NFR BLD AUTO: 0.7 % — HIGH (ref 0.1–0.3)
IMM GRANULOCYTES NFR BLD AUTO: 0.7 % — HIGH (ref 0.1–0.3)
LYMPHOCYTES # BLD AUTO: 1.06 K/UL — LOW (ref 1.2–3.4)
LYMPHOCYTES # BLD AUTO: 1.06 K/UL — LOW (ref 1.2–3.4)
LYMPHOCYTES # BLD AUTO: 1.07 K/UL — LOW (ref 1.2–3.4)
LYMPHOCYTES # BLD AUTO: 1.07 K/UL — LOW (ref 1.2–3.4)
LYMPHOCYTES # BLD AUTO: 7.9 % — LOW (ref 20.5–51.1)
LYMPHOCYTES # BLD AUTO: 7.9 % — LOW (ref 20.5–51.1)
LYMPHOCYTES # BLD AUTO: 8 % — LOW (ref 20.5–51.1)
LYMPHOCYTES # BLD AUTO: 8 % — LOW (ref 20.5–51.1)
MAGNESIUM SERPL-MCNC: 2.2 MG/DL — SIGNIFICANT CHANGE UP (ref 1.8–2.4)
MAGNESIUM SERPL-MCNC: 2.2 MG/DL — SIGNIFICANT CHANGE UP (ref 1.8–2.4)
MCHC RBC-ENTMCNC: 23.5 PG — LOW (ref 27–31)
MCHC RBC-ENTMCNC: 23.5 PG — LOW (ref 27–31)
MCHC RBC-ENTMCNC: 23.7 PG — LOW (ref 27–31)
MCHC RBC-ENTMCNC: 23.7 PG — LOW (ref 27–31)
MCHC RBC-ENTMCNC: 30.6 G/DL — LOW (ref 32–37)
MCHC RBC-ENTMCNC: 30.6 G/DL — LOW (ref 32–37)
MCHC RBC-ENTMCNC: 31 G/DL — LOW (ref 32–37)
MCHC RBC-ENTMCNC: 31 G/DL — LOW (ref 32–37)
MCV RBC AUTO: 76.3 FL — LOW (ref 81–99)
MCV RBC AUTO: 76.3 FL — LOW (ref 81–99)
MCV RBC AUTO: 76.9 FL — LOW (ref 81–99)
MCV RBC AUTO: 76.9 FL — LOW (ref 81–99)
MONOCYTES # BLD AUTO: 0.57 K/UL — SIGNIFICANT CHANGE UP (ref 0.1–0.6)
MONOCYTES # BLD AUTO: 0.57 K/UL — SIGNIFICANT CHANGE UP (ref 0.1–0.6)
MONOCYTES # BLD AUTO: 0.66 K/UL — HIGH (ref 0.1–0.6)
MONOCYTES # BLD AUTO: 0.66 K/UL — HIGH (ref 0.1–0.6)
MONOCYTES NFR BLD AUTO: 4.2 % — SIGNIFICANT CHANGE UP (ref 1.7–9.3)
MONOCYTES NFR BLD AUTO: 4.2 % — SIGNIFICANT CHANGE UP (ref 1.7–9.3)
MONOCYTES NFR BLD AUTO: 5 % — SIGNIFICANT CHANGE UP (ref 1.7–9.3)
MONOCYTES NFR BLD AUTO: 5 % — SIGNIFICANT CHANGE UP (ref 1.7–9.3)
NEUTROPHILS # BLD AUTO: 10.98 K/UL — HIGH (ref 1.4–6.5)
NEUTROPHILS # BLD AUTO: 10.98 K/UL — HIGH (ref 1.4–6.5)
NEUTROPHILS # BLD AUTO: 11.31 K/UL — HIGH (ref 1.4–6.5)
NEUTROPHILS # BLD AUTO: 11.31 K/UL — HIGH (ref 1.4–6.5)
NEUTROPHILS NFR BLD AUTO: 82.8 % — HIGH (ref 42.2–75.2)
NEUTROPHILS NFR BLD AUTO: 82.8 % — HIGH (ref 42.2–75.2)
NEUTROPHILS NFR BLD AUTO: 83.8 % — HIGH (ref 42.2–75.2)
NEUTROPHILS NFR BLD AUTO: 83.8 % — HIGH (ref 42.2–75.2)
NRBC # BLD: 0 /100 WBCS — SIGNIFICANT CHANGE UP (ref 0–0)
PLATELET # BLD AUTO: 261 K/UL — SIGNIFICANT CHANGE UP (ref 130–400)
PLATELET # BLD AUTO: 261 K/UL — SIGNIFICANT CHANGE UP (ref 130–400)
PLATELET # BLD AUTO: 263 K/UL — SIGNIFICANT CHANGE UP (ref 130–400)
PLATELET # BLD AUTO: 263 K/UL — SIGNIFICANT CHANGE UP (ref 130–400)
PMV BLD: 10 FL — SIGNIFICANT CHANGE UP (ref 7.4–10.4)
PMV BLD: 10 FL — SIGNIFICANT CHANGE UP (ref 7.4–10.4)
PMV BLD: 10.1 FL — SIGNIFICANT CHANGE UP (ref 7.4–10.4)
PMV BLD: 10.1 FL — SIGNIFICANT CHANGE UP (ref 7.4–10.4)
POTASSIUM SERPL-MCNC: 3.4 MMOL/L — LOW (ref 3.5–5)
POTASSIUM SERPL-MCNC: 3.4 MMOL/L — LOW (ref 3.5–5)
POTASSIUM SERPL-SCNC: 3.4 MMOL/L — LOW (ref 3.5–5)
POTASSIUM SERPL-SCNC: 3.4 MMOL/L — LOW (ref 3.5–5)
PROT SERPL-MCNC: 5.7 G/DL — LOW (ref 6–8)
PROT SERPL-MCNC: 5.7 G/DL — LOW (ref 6–8)
RBC # BLD: 4.12 M/UL — LOW (ref 4.2–5.4)
RBC # BLD: 4.12 M/UL — LOW (ref 4.2–5.4)
RBC # BLD: 4.14 M/UL — LOW (ref 4.2–5.4)
RBC # BLD: 4.14 M/UL — LOW (ref 4.2–5.4)
RBC # FLD: 25.5 % — HIGH (ref 11.5–14.5)
RBC # FLD: 25.5 % — HIGH (ref 11.5–14.5)
RBC # FLD: 25.8 % — HIGH (ref 11.5–14.5)
RBC # FLD: 25.8 % — HIGH (ref 11.5–14.5)
SODIUM SERPL-SCNC: 134 MMOL/L — LOW (ref 135–146)
SODIUM SERPL-SCNC: 134 MMOL/L — LOW (ref 135–146)
SPECIMEN SOURCE: SIGNIFICANT CHANGE UP
WBC # BLD: 13.25 K/UL — HIGH (ref 4.8–10.8)
WBC # BLD: 13.25 K/UL — HIGH (ref 4.8–10.8)
WBC # BLD: 13.51 K/UL — HIGH (ref 4.8–10.8)
WBC # BLD: 13.51 K/UL — HIGH (ref 4.8–10.8)
WBC # FLD AUTO: 13.25 K/UL — HIGH (ref 4.8–10.8)
WBC # FLD AUTO: 13.25 K/UL — HIGH (ref 4.8–10.8)
WBC # FLD AUTO: 13.51 K/UL — HIGH (ref 4.8–10.8)
WBC # FLD AUTO: 13.51 K/UL — HIGH (ref 4.8–10.8)

## 2023-10-19 PROCEDURE — 71045 X-RAY EXAM CHEST 1 VIEW: CPT | Mod: 26

## 2023-10-19 PROCEDURE — 99291 CRITICAL CARE FIRST HOUR: CPT

## 2023-10-19 RX ORDER — POTASSIUM CHLORIDE 20 MEQ
20 PACKET (EA) ORAL ONCE
Refills: 0 | Status: COMPLETED | OUTPATIENT
Start: 2023-10-19 | End: 2023-10-19

## 2023-10-19 RX ORDER — ACETAMINOPHEN 500 MG
650 TABLET ORAL EVERY 6 HOURS
Refills: 0 | Status: DISCONTINUED | OUTPATIENT
Start: 2023-10-19 | End: 2023-11-02

## 2023-10-19 RX ORDER — MIDODRINE HYDROCHLORIDE 2.5 MG/1
10 TABLET ORAL EVERY 8 HOURS
Refills: 0 | Status: DISCONTINUED | OUTPATIENT
Start: 2023-10-19 | End: 2023-10-31

## 2023-10-19 RX ORDER — LACTULOSE 10 G/15ML
20 SOLUTION ORAL EVERY 6 HOURS
Refills: 0 | Status: DISCONTINUED | OUTPATIENT
Start: 2023-10-19 | End: 2023-10-21

## 2023-10-19 RX ADMIN — MEROPENEM 100 MILLIGRAM(S): 1 INJECTION INTRAVENOUS at 02:39

## 2023-10-19 RX ADMIN — ENOXAPARIN SODIUM 50 MILLIGRAM(S): 100 INJECTION SUBCUTANEOUS at 05:19

## 2023-10-19 RX ADMIN — Medication 650 MILLIGRAM(S): at 23:00

## 2023-10-19 RX ADMIN — Medication 4.68 MICROGRAM(S)/KG/MIN: at 20:42

## 2023-10-19 RX ADMIN — POLYETHYLENE GLYCOL 3350 17 GRAM(S): 17 POWDER, FOR SOLUTION ORAL at 13:02

## 2023-10-19 RX ADMIN — MEROPENEM 100 MILLIGRAM(S): 1 INJECTION INTRAVENOUS at 17:20

## 2023-10-19 RX ADMIN — MEROPENEM 100 MILLIGRAM(S): 1 INJECTION INTRAVENOUS at 09:28

## 2023-10-19 RX ADMIN — ENOXAPARIN SODIUM 50 MILLIGRAM(S): 100 INJECTION SUBCUTANEOUS at 17:19

## 2023-10-19 RX ADMIN — MIDODRINE HYDROCHLORIDE 10 MILLIGRAM(S): 2.5 TABLET ORAL at 13:03

## 2023-10-19 RX ADMIN — Medication 50 MILLIEQUIVALENT(S): at 05:56

## 2023-10-19 RX ADMIN — PANTOPRAZOLE SODIUM 40 MILLIGRAM(S): 20 TABLET, DELAYED RELEASE ORAL at 17:18

## 2023-10-19 RX ADMIN — LACTULOSE 20 GRAM(S): 10 SOLUTION ORAL at 23:19

## 2023-10-19 RX ADMIN — CHLORHEXIDINE GLUCONATE 15 MILLILITER(S): 213 SOLUTION TOPICAL at 17:18

## 2023-10-19 RX ADMIN — RALOXIFENE HYDROCHLORIDE 60 MILLIGRAM(S): 60 TABLET, COATED ORAL at 13:02

## 2023-10-19 RX ADMIN — LACTULOSE 20 GRAM(S): 10 SOLUTION ORAL at 17:19

## 2023-10-19 RX ADMIN — FENTANYL CITRATE 2.5 MICROGRAM(S)/KG/HR: 50 INJECTION INTRAVENOUS at 20:42

## 2023-10-19 RX ADMIN — PANTOPRAZOLE SODIUM 40 MILLIGRAM(S): 20 TABLET, DELAYED RELEASE ORAL at 05:19

## 2023-10-19 RX ADMIN — GABAPENTIN 600 MILLIGRAM(S): 400 CAPSULE ORAL at 13:02

## 2023-10-19 RX ADMIN — Medication 1 TABLET(S): at 13:02

## 2023-10-19 RX ADMIN — MIDODRINE HYDROCHLORIDE 10 MILLIGRAM(S): 2.5 TABLET ORAL at 21:56

## 2023-10-19 RX ADMIN — Medication 650 MILLIGRAM(S): at 22:30

## 2023-10-19 RX ADMIN — CHLORHEXIDINE GLUCONATE 1 APPLICATION(S): 213 SOLUTION TOPICAL at 05:18

## 2023-10-19 RX ADMIN — MIDODRINE HYDROCHLORIDE 5 MILLIGRAM(S): 2.5 TABLET ORAL at 05:19

## 2023-10-19 RX ADMIN — CHLORHEXIDINE GLUCONATE 15 MILLILITER(S): 213 SOLUTION TOPICAL at 05:19

## 2023-10-19 RX ADMIN — LACTULOSE 20 GRAM(S): 10 SOLUTION ORAL at 13:02

## 2023-10-19 RX ADMIN — SENNA PLUS 2 TABLET(S): 8.6 TABLET ORAL at 21:56

## 2023-10-19 NOTE — PROGRESS NOTE ADULT - ASSESSMENT
Patient is a 58 year old female with a PMHx of Down syndrome, nonverbal at baseline, hypothyroidism, cerebral palsy, congenital pulmonary stenosis, seizures, presents to the ED from nursing facility for syncope associated with tonic-clonic movement witnessed by aide, vitals showed hypoxia and bradycardia for about 10-15 minutes, found to have multilobar pneumonia. Pt with recent admission to ICU for UGIB, found to have duodenal perforation, also found to have PNA, admitted to ICU for septic and hypovolemic shock requiring intubation and pressors.     #Acute hypoxemic respiratory failure s/p intubation  #Aspiration pneumonia   #DVT RO PE  -Pt being treated for multilobar pna this admission. Possible aspiration event on 10/17 AM, RR called for desats, no improvement on nonrebreather>HFNC>anesthesia intubated patient on floor. R IJ placed, patient started on levo and precedex.   -Pt currently on levo 0.12, fentanyl 1.5.   -taper pressors   -midodrine 10 q8  -CXR - Bilateral opacities and left worse than right lower lobe atelectasis.  -ID following, on meropenem   -Duplex 10/17 shows DVT in Left common fem - pt on Lovenox 50mg bid  -CT angio 10/18 - No CT evidence of pulmonary embolism. Bilateral pneumonia.  -Hb 10.3 > 9.8  -f/u cbc   -lactulose added to bowel regimen, monitor for BM (pt on fentanyl drip)    #H/o recent duodenal perforation  -pt on GI ppx  -Hb stable  -Pt on therapeutic lovenox for DVT  -monitor Hb     #Osteomyelitis, right foot   -Patient with multiple decubitus ulcers on her right foot, less on left foot.   -Xray right foot showed post transmetatarsal amputation of the first ray with periosteal reaction at the amputation stump, suspicious for recurrent osteomyelitis. There is second toe ulcer with osseous erosion/destruction at the second distal phalangeal tuft, consistent with osteomyelitis. There is dorsal   -MR right foot - 1.  Limited exam. 2.  Osteomyelitis of the first metatarsal stump. 3.  Osteomyelitis of the second toe distal phalanx.  -podiatry following, OR for debridement - was scheduled for Friday 10/20 - cancelled and will reassess for surgery next week.    #H/o seizures, not on AED  -On day of admission pt had tonic-clonic movements witnessed by aide  -neuro consulted - Episode suspicious for cardiac event leading to drop in BP and HR. Unclear if epileptic seizure.  -EEG showed generalized slowing, no seizure activity   -OVN 10/17 RN noted another episode, lasted around 30sec with whole body rhythmic shaking, left arm jerking, abnormal eye movements. 2mg ativan given.   -neuro following  -f/u vEEG     #HO polymicrobial bacteremia   #H/o CP    Misc:  Diet - npo, tube feeds   Activity - bedrest  DVT ppx - therapeutic lovenox     Lines - R IJ, barlow  Drips - fentanyl 1.5, levo 0.12

## 2023-10-19 NOTE — CHART NOTE - NSCHARTNOTEFT_GEN_A_CORE
Consent for vEEG obtained from La Nena Mcfarland, CAB from  ([phone]). Consent for vEEG obtained from Amy Dumont, CAB representative from  ([phone]).

## 2023-10-19 NOTE — PROGRESS NOTE ADULT - SUBJECTIVE AND OBJECTIVE BOX
Over Night Events: events noted, remain critically ill, still intubated, ventilated, ct angio noted, low grade fever, sp Neuro/ EEG  PHYSICAL EXAM    ICU Vital Signs Last 24 Hrs  T(C): 37.3 (19 Oct 2023 04:00), Max: 37.5 (18 Oct 2023 08:00)  T(F): 99.2 (19 Oct 2023 04:00), Max: 99.5 (18 Oct 2023 08:00)  HR: 64 (19 Oct 2023 05:30) (51 - 107)  BP: 91/55 (19 Oct 2023 05:30) (86/45 - 133/62)  BP(mean): 72 (19 Oct 2023 05:30) (63 - 89)  RR: 14 (19 Oct 2023 05:30) (14 - 25)  SpO2: 97% (19 Oct 2023 05:30) (93% - 100%)        General: ILL Looking  ETT  Lungs: Bilateral Rhonchi  Cardiovascular: Regular   Abdomen: Soft, Positive BS  Extremities: No clubbing   contacted  not following commands      10-17-23 @ 07:01  -  10-18-23 @ 07:00  --------------------------------------------------------  IN:    Dexmedetomidine: 53.4 mL    Enteral Tube Flush: 100 mL    FentaNYL: 107 mL    IV PiggyBack: 300 mL    Lactated Ringers Bolus: 750 mL    Norepinephrine: 187 mL  Total IN: 1497.4 mL    OUT:    Indwelling Catheter - Urethral (mL): 880 mL  Total OUT: 880 mL    Total NET: 617.4 mL      10-18-23 @ 07:01  -  10-19-23 @ 06:48  --------------------------------------------------------  IN:    FentaNYL: 147 mL    IV PiggyBack: 200 mL    Norepinephrine: 273.9 mL  Total IN: 620.9 mL    OUT:    Dexmedetomidine: 0 mL    Indwelling Catheter - Urethral (mL): 695 mL  Total OUT: 695 mL    Total NET: -74.1 mL          LABS:                          9.8    13.51 )-----------( 263      ( 19 Oct 2023 05:00 )             31.6                                               10-19    134<L>  |  97<L>  |  6<L>  ----------------------------<  128<H>  3.4<L>   |  30  |  0.5<L>    Ca    7.7<L>      19 Oct 2023 05:00  Phos  3.1     10-17  Mg     2.2     10-19    TPro  5.7<L>  /  Alb  2.7<L>  /  TBili  0.3  /  DBili  x   /  AST  19  /  ALT  15  /  AlkPhos  117<H>  10-19      PT/INR - ( 17 Oct 2023 12:35 )   PT: 12.50 sec;   INR: 1.09 ratio         PTT - ( 17 Oct 2023 12:35 )  PTT:22.9 sec                                       Urinalysis Basic - ( 19 Oct 2023 05:00 )    Color: x / Appearance: x / SG: x / pH: x  Gluc: 128 mg/dL / Ketone: x  / Bili: x / Urobili: x   Blood: x / Protein: x / Nitrite: x   Leuk Esterase: x / RBC: x / WBC x   Sq Epi: x / Non Sq Epi: x / Bacteria: x        CARDIAC MARKERS ( 17 Oct 2023 12:35 )  x     / 0.01 ng/mL / 31 U/L / x     / x                                                LIVER FUNCTIONS - ( 19 Oct 2023 05:00 )  Alb: 2.7 g/dL / Pro: 5.7 g/dL / ALK PHOS: 117 U/L / ALT: 15 U/L / AST: 19 U/L / GGT: x                                                  Culture - Sputum (collected 17 Oct 2023 19:37)  Source: Trach Asp Tracheal Aspirate  Gram Stain (18 Oct 2023 02:39):    Numerous polymorphonuclear leukocytes per low power field    No Squamous epithelial cells per low power field    Moderate Yeast like cells seen per oil power field  Preliminary Report (18 Oct 2023 20:31):    Normal Respiratory Mili present    Culture - Urine (collected 17 Oct 2023 19:37)  Source: Clean Catch Clean Catch (Midstream)  Final Report (19 Oct 2023 00:23):    No growth    Culture - Blood (collected 17 Oct 2023 12:35)  Source: .Blood Blood  Preliminary Report (18 Oct 2023 22:01):    No growth at 24 hours                                                   Mode: AC/ CMV (Assist Control/ Continuous Mandatory Ventilation)  RR (machine): 14  TV (machine): 300  FiO2: 60  PEEP: 8  ITime: 1  MAP: 11  PIP: 27                                      ABG - ( 19 Oct 2023 03:27 )  pH, Arterial: 7.44  pH, Blood: x     /  pCO2: 46    /  pO2: 103   / HCO3: 31    / Base Excess: 6.2   /  SaO2: 98.3                MEDICATIONS  (STANDING):  chlorhexidine 0.12% Liquid 15 milliLiter(s) Oral Mucosa every 12 hours  chlorhexidine 2% Cloths 1 Application(s) Topical <User Schedule>  dexMEDEtomidine Infusion 0.5 MICROgram(s)/kG/Hr (6.24 mL/Hr) IV Continuous <Continuous>  enoxaparin Injectable 50 milliGRAM(s) SubCutaneous every 12 hours  fentaNYL   Infusion. 0.5 MICROgram(s)/kG/Hr (2.5 mL/Hr) IV Continuous <Continuous>  gabapentin 600 milliGRAM(s) Oral daily  meropenem  IVPB 1000 milliGRAM(s) IV Intermittent every 8 hours  midodrine 5 milliGRAM(s) Oral every 8 hours  multivitamin 1 Tablet(s) Oral daily  norepinephrine Infusion 0.05 MICROgram(s)/kG/Min (4.68 mL/Hr) IV Continuous <Continuous>  pantoprazole   Suspension 40 milliGRAM(s) Oral two times a day  polyethylene glycol 3350 17 Gram(s) Oral daily  raloxifene 60 milliGRAM(s) Oral daily  senna 2 Tablet(s) Oral at bedtime    chest ct noted   Over Night Events: events noted, remain critically ill, still intubated, ventilated, ct angio noted, low grade fever, sp Neuro/ EEG. levophed 0.12, fentanyl    PHYSICAL EXAM    ICU Vital Signs Last 24 Hrs  T(C): 37.3 (19 Oct 2023 04:00), Max: 37.5 (18 Oct 2023 08:00)  T(F): 99.2 (19 Oct 2023 04:00), Max: 99.5 (18 Oct 2023 08:00)  HR: 64 (19 Oct 2023 05:30) (51 - 107)  BP: 91/55 (19 Oct 2023 05:30) (86/45 - 133/62)  BP(mean): 72 (19 Oct 2023 05:30) (63 - 89)  RR: 14 (19 Oct 2023 05:30) (14 - 25)  SpO2: 97% (19 Oct 2023 05:30) (93% - 100%)        General: ILL Looking  ETT  Lungs: Bilateral Rhonchi  Cardiovascular: Regular   Abdomen: Soft, Positive BS  Extremities: No clubbing   contacted  not following commands      10-17-23 @ 07:01  -  10-18-23 @ 07:00  --------------------------------------------------------  IN:    Dexmedetomidine: 53.4 mL    Enteral Tube Flush: 100 mL    FentaNYL: 107 mL    IV PiggyBack: 300 mL    Lactated Ringers Bolus: 750 mL    Norepinephrine: 187 mL  Total IN: 1497.4 mL    OUT:    Indwelling Catheter - Urethral (mL): 880 mL  Total OUT: 880 mL    Total NET: 617.4 mL      10-18-23 @ 07:01  -  10-19-23 @ 06:48  --------------------------------------------------------  IN:    FentaNYL: 147 mL    IV PiggyBack: 200 mL    Norepinephrine: 273.9 mL  Total IN: 620.9 mL    OUT:    Dexmedetomidine: 0 mL    Indwelling Catheter - Urethral (mL): 695 mL  Total OUT: 695 mL    Total NET: -74.1 mL          LABS:                          9.8    13.51 )-----------( 263      ( 19 Oct 2023 05:00 )             31.6                                               10-19    134<L>  |  97<L>  |  6<L>  ----------------------------<  128<H>  3.4<L>   |  30  |  0.5<L>    Ca    7.7<L>      19 Oct 2023 05:00  Phos  3.1     10-17  Mg     2.2     10-19    TPro  5.7<L>  /  Alb  2.7<L>  /  TBili  0.3  /  DBili  x   /  AST  19  /  ALT  15  /  AlkPhos  117<H>  10-19      PT/INR - ( 17 Oct 2023 12:35 )   PT: 12.50 sec;   INR: 1.09 ratio         PTT - ( 17 Oct 2023 12:35 )  PTT:22.9 sec                                       Urinalysis Basic - ( 19 Oct 2023 05:00 )    Color: x / Appearance: x / SG: x / pH: x  Gluc: 128 mg/dL / Ketone: x  / Bili: x / Urobili: x   Blood: x / Protein: x / Nitrite: x   Leuk Esterase: x / RBC: x / WBC x   Sq Epi: x / Non Sq Epi: x / Bacteria: x        CARDIAC MARKERS ( 17 Oct 2023 12:35 )  x     / 0.01 ng/mL / 31 U/L / x     / x                                                LIVER FUNCTIONS - ( 19 Oct 2023 05:00 )  Alb: 2.7 g/dL / Pro: 5.7 g/dL / ALK PHOS: 117 U/L / ALT: 15 U/L / AST: 19 U/L / GGT: x                                                  Culture - Sputum (collected 17 Oct 2023 19:37)  Source: Trach Asp Tracheal Aspirate  Gram Stain (18 Oct 2023 02:39):    Numerous polymorphonuclear leukocytes per low power field    No Squamous epithelial cells per low power field    Moderate Yeast like cells seen per oil power field  Preliminary Report (18 Oct 2023 20:31):    Normal Respiratory Mili present    Culture - Urine (collected 17 Oct 2023 19:37)  Source: Clean Catch Clean Catch (Midstream)  Final Report (19 Oct 2023 00:23):    No growth    Culture - Blood (collected 17 Oct 2023 12:35)  Source: .Blood Blood  Preliminary Report (18 Oct 2023 22:01):    No growth at 24 hours                                                   Mode: AC/ CMV (Assist Control/ Continuous Mandatory Ventilation)  RR (machine): 14  TV (machine): 300  FiO2: 60  PEEP: 8  ITime: 1  MAP: 11  PIP: 27                                      ABG - ( 19 Oct 2023 03:27 )  pH, Arterial: 7.44  pH, Blood: x     /  pCO2: 46    /  pO2: 103   / HCO3: 31    / Base Excess: 6.2   /  SaO2: 98.3                MEDICATIONS  (STANDING):  chlorhexidine 0.12% Liquid 15 milliLiter(s) Oral Mucosa every 12 hours  chlorhexidine 2% Cloths 1 Application(s) Topical <User Schedule>  dexMEDEtomidine Infusion 0.5 MICROgram(s)/kG/Hr (6.24 mL/Hr) IV Continuous <Continuous>  enoxaparin Injectable 50 milliGRAM(s) SubCutaneous every 12 hours  fentaNYL   Infusion. 0.5 MICROgram(s)/kG/Hr (2.5 mL/Hr) IV Continuous <Continuous>  gabapentin 600 milliGRAM(s) Oral daily  meropenem  IVPB 1000 milliGRAM(s) IV Intermittent every 8 hours  midodrine 5 milliGRAM(s) Oral every 8 hours  multivitamin 1 Tablet(s) Oral daily  norepinephrine Infusion 0.05 MICROgram(s)/kG/Min (4.68 mL/Hr) IV Continuous <Continuous>  pantoprazole   Suspension 40 milliGRAM(s) Oral two times a day  polyethylene glycol 3350 17 Gram(s) Oral daily  raloxifene 60 milliGRAM(s) Oral daily  senna 2 Tablet(s) Oral at bedtime    chest ct noted

## 2023-10-19 NOTE — PROGRESS NOTE ADULT - ASSESSMENT
IMPRESSION:    Acute hypoxemic respiratory failure sp intubation  Aspiration pneumonia  DVT   HO recent duodenal perforation   foot OM/ MRI noted  HO polymicrobial bacteremia   H/o CP  H/o seizures    PLAN:    CNS: PRN sedation. V EEG. Neuro fup    HEENT: Oral care.  ET care     PULMONARY: HOB at 45 degrees.  Aspiration precaution.  SaO2 92 TO 96%.  DTA.  Monitor PPL and DP  CT angio noted, dec FIO2 50%    CARDIOVASCULAR: Goal directed fluid resuscitation.  taper pressors    GI: Protonix BID.  OG feeding.  Bowel regimen     RENAL:    FU lytes.  Correct as needed.      INFECTIOUS DISEASE:  ABX PER ID    HEMATOLOGICAL: DVT prophylaxis seq.  Monitor CBC. lovenox therapeutic    ENDOCRINE:  Follow up FS.  Insulin protocol if needed.    MUSCULOSKELETAL: Bedrest.  Off loading.  Wound care.      Prognosis guarded.      [facility] 10/17

## 2023-10-19 NOTE — PROGRESS NOTE ADULT - SUBJECTIVE AND OBJECTIVE BOX
24H events:    Patient is a 58y old Female who presents with a chief complaint of Pre-syncope (19 Oct 2023 06:48)    Primary diagnosis of Pre-syncope      Day 1:  Day 2:  Day 3:     Today is hospital day 5d. This morning patient was seen and examined at bedside, resting comfortably in bed.    No acute or major events overnight.    Code Status:    Family communication:  Contact date:  Name of person contacted:  Relationship to patient:  Communication details:  What matters most:    PAST MEDICAL & SURGICAL HISTORY  Down syndrome    Osteoporosis    Mild anemia    Neuropathy    S/P debridement  of R hip on 3/2/21      SOCIAL HISTORY:  Social History:  Lives at nursing home (14 Oct 2023 20:33)      ALLERGIES:  No Known Allergies    MEDICATIONS:  STANDING MEDICATIONS  chlorhexidine 0.12% Liquid 15 milliLiter(s) Oral Mucosa every 12 hours  chlorhexidine 2% Cloths 1 Application(s) Topical <User Schedule>  dexMEDEtomidine Infusion 0.5 MICROgram(s)/kG/Hr IV Continuous <Continuous>  enoxaparin Injectable 50 milliGRAM(s) SubCutaneous every 12 hours  fentaNYL   Infusion. 0.5 MICROgram(s)/kG/Hr IV Continuous <Continuous>  gabapentin 600 milliGRAM(s) Oral daily  lactulose Syrup 20 Gram(s) Oral every 6 hours  meropenem  IVPB 1000 milliGRAM(s) IV Intermittent every 8 hours  midodrine 10 milliGRAM(s) Oral every 8 hours  multivitamin 1 Tablet(s) Oral daily  norepinephrine Infusion 0.05 MICROgram(s)/kG/Min IV Continuous <Continuous>  pantoprazole   Suspension 40 milliGRAM(s) Oral two times a day  polyethylene glycol 3350 17 Gram(s) Oral daily  raloxifene 60 milliGRAM(s) Oral daily  senna 2 Tablet(s) Oral at bedtime    PRN MEDICATIONS    VITALS:   T(F): 99.1  HR: 60  BP: 95/54  RR: 14  SpO2: 96%    PHYSICAL EXAM:  GENERAL: intubated and sedated  (  ) NAD, lying in bed comfortably     (  ) obtunded     (  ) lethargic     (  ) somnolent    HEAD:   ( x) Atraumatic     (  ) hematoma     (  ) laceration (specify location:       )     NECK:  ( x ) Supple     (  ) neck stiffness     (  ) nuchal rigidity     (  )  no JVD     (  ) JVD present ( -- cm)    HEART:  Rate -->     (x  ) normal rate     (  ) bradycardic     (  ) tachycardic  Rhythm -->     ( x ) regular     (  ) regularly irregular     (  ) irregularly irregular  Murmurs -->     (  ) normal s1s2     (  ) systolic murmur     (  ) diastolic murmur     (  ) continuous murmur      (  ) S3 present     (  ) S4 present    LUNGS:   ( x )Unlabored respirations     (  ) tachypnea  ( x) B/L air entry     (  ) decreased breath sounds in:  (location     )    (  ) no adventitious sound     (  ) crackles     (  ) wheezing      (  ) rhonchi      (specify location:       )  (  ) chest wall tenderness (specify location:       )    ABDOMEN:   (x  ) Soft     (  ) tense   |   (x  ) nondistended     (  ) distended   |   (  ) +BS     (  ) hypoactive bowel sounds     (  ) hyperactive bowel sounds  (  ) nontender     (  ) RUQ tenderness     (  ) RLQ tenderness     (  ) LLQ tenderness     (  ) epigastric tenderness     (  ) diffuse tenderness  (  ) Splenomegaly      (  ) Hepatomegaly      (  ) Jaundice     (  ) ecchymosis     EXTREMITIES: contracted   (  ) Normal     (  ) Rash     (  ) ecchymosis     (  ) varicose veins      (  ) pitting edema     (  ) non-pitting edema   (  ) ulceration     (  ) gangrene:     (location:     )    NERVOUS SYSTEM:  pt is sedated   (  ) A&Ox3     (  ) confused     (  ) lethargic  CN II-XII:     (  ) Intact     (  ) deficits found     (Specify:     )   Upper extremities:     (  ) no sensorimotor deficits     (  ) weakness     (  ) loss of proprioception/vibration     (  ) loss of touch/temperature (specify:    )  Lower extremities:     (  ) no sensorimotor deficits     (  ) weakness     (  ) loss of proprioception/vibration     (  ) loss of touch/temperature (specify:    )    SKIN:   (  ) No rashes or lesions     (  ) maculopapular rash     (  ) pustules     (  ) vesicles     (  ) ulcer     (  ) ecchymosis     (specify location:     )    AMPAC score:    ( x ) Indwelling Madsen Catheter:   Date insterted: 10/17   Reason (  ) Critical illness     ( x ) urinary retention    (  ) Accurate Ins/Outs Monitoring     (  ) CMO patient    ( x ) Central Line:   Date inserted: 10/17  Location: ( x ) Right IJ     (  ) Left IJ     (  ) Right Fem     (  ) Left Fem    (  ) SPC        (  ) pigtail       (  ) PEG tube       (  ) colostomy       (  ) jejunostomy  (  ) U-Dall    LABS:                        9.8    13.51 )-----------( 263      ( 19 Oct 2023 05:00 )             31.6     10-19    134<L>  |  97<L>  |  6<L>  ----------------------------<  128<H>  3.4<L>   |  30  |  0.5<L>    Ca    7.7<L>      19 Oct 2023 05:00  Phos  3.1     10-17  Mg     2.2     10-19    TPro  5.7<L>  /  Alb  2.7<L>  /  TBili  0.3  /  DBili  x   /  AST  19  /  ALT  15  /  AlkPhos  117<H>  10-19    PT/INR - ( 17 Oct 2023 12:35 )   PT: 12.50 sec;   INR: 1.09 ratio         PTT - ( 17 Oct 2023 12:35 )  PTT:22.9 sec  Urinalysis Basic - ( 19 Oct 2023 05:00 )    Color: x / Appearance: x / SG: x / pH: x  Gluc: 128 mg/dL / Ketone: x  / Bili: x / Urobili: x   Blood: x / Protein: x / Nitrite: x   Leuk Esterase: x / RBC: x / WBC x   Sq Epi: x / Non Sq Epi: x / Bacteria: x      ABG - ( 19 Oct 2023 03:27 )  pH, Arterial: 7.44  pH, Blood: x     /  pCO2: 46    /  pO2: 103   / HCO3: 31    / Base Excess: 6.2   /  SaO2: 98.3                  Culture - Sputum (collected 17 Oct 2023 19:37)  Source: Trach Asp Tracheal Aspirate  Gram Stain (18 Oct 2023 02:39):    Numerous polymorphonuclear leukocytes per low power field    No Squamous epithelial cells per low power field    Moderate Yeast like cells seen per oil power field  Preliminary Report (18 Oct 2023 20:31):    Normal Respiratory Mili present    Culture - Urine (collected 17 Oct 2023 19:37)  Source: Clean Catch Clean Catch (Midstream)  Final Report (19 Oct 2023 00:23):    No growth    Culture - Blood (collected 17 Oct 2023 12:35)  Source: .Blood Blood  Preliminary Report (18 Oct 2023 22:01):    No growth at 24 hours      CARDIAC MARKERS ( 17 Oct 2023 12:35 )  x     / 0.01 ng/mL / 31 U/L / x     / x          RADIOLOGY:

## 2023-10-19 NOTE — CHART NOTE - NSCHARTNOTEFT_GEN_A_CORE
Registered Dietitian Follow-Up     Patient Profile Reviewed                           Yes [x]   No []     Nutrition History Previously Obtained        Yes [x]  No []        Pertinent Medical Interventions: Pt desaturated on 10/17 possibly secondary to aspiration episode -> RRT called and intubated. Pt being managed for Acute hypoxemic respiratory failure sp intubation, Aspiration pneumonia, DVT and foot OM.      Diet order: Diet, NPO (10-17-23 @ 06:55) [Active]    Anthropometrics:  Height (cm): 144.8 (10-17-23 @ 10:25)  Weight (kg): 60.6 (10-17-23 @ 19:00)  BMI (kg/m2): 28.9 (10-17-23 @ 19:00)  IBW: 38.6 kg  UBW: 52.1 kg    Daily Weight in k.7 (10-19), Weight in k.5 (10-18), Weight in k.6 (10-17), Weight in k.4 (10-16), Weight in k.9 (10-15)  Weight Change: decreased likely reflective of improved fluid retention     MEDICATIONS  (STANDING):  chlorhexidine 0.12% Liquid 15 milliLiter(s) Oral Mucosa every 12 hours  chlorhexidine 2% Cloths 1 Application(s) Topical <User Schedule>  dexMEDEtomidine Infusion 0.5 MICROgram(s)/kG/Hr (6.24 mL/Hr) IV Continuous <Continuous>  enoxaparin Injectable 50 milliGRAM(s) SubCutaneous every 12 hours  fentaNYL   Infusion. 0.5 MICROgram(s)/kG/Hr (2.5 mL/Hr) IV Continuous <Continuous>  gabapentin 600 milliGRAM(s) Oral daily  lactulose Syrup 20 Gram(s) Oral every 6 hours  meropenem  IVPB 1000 milliGRAM(s) IV Intermittent every 8 hours  midodrine 10 milliGRAM(s) Oral every 8 hours  multivitamin 1 Tablet(s) Oral daily  norepinephrine Infusion 0.05 MICROgram(s)/kG/Min (4.68 mL/Hr) IV Continuous <Continuous>  pantoprazole   Suspension 40 milliGRAM(s) Oral two times a day  polyethylene glycol 3350 17 Gram(s) Oral daily  raloxifene 60 milliGRAM(s) Oral daily  senna 2 Tablet(s) Oral at bedtime    Pertinent Labs:                         9.7    13.25 )-----------( 261      ( 19 Oct 2023 11:05 )             31.7     10-19 @ 05:00: Na 134<L>, BUN 6<L>, Cr 0.5<L>, <H>, K+ 3.4<L>, Phos --, Mg 2.2, Alk Phos 117<H>, ALT/SGPT 15, AST/SGOT 19, HbA1c --  10- @ 21:53: Na --, BUN --, Cr --, BG --, K+ --, Phos --, Mg 3.0<H>, Alk Phos --, ALT/SGPT --, AST/SGOT --, HbA1c --    Physical Findings:  - Appearance: sedated  - GI function: abdomen WDL; last bowel movement 16-Oct-2023  - Tubes: OG tube  - Oral/Mouth cavity: NPO   - Skin: WOCN 10/18: Friction and Moisture Associated Skin Damage to Bilateral buttocks  - Edema: 2+ right hand edema     Nutrition Requirements:  Weight Used: lowest weight recorded in this admission 53.5 kg     Estimated Energy Needs    Continue []  Adjust [x]  Energy Recommendations: 1129 kcal/day - Ve 4 Tm 37.5    Estimated Protein Needs    Continue []  Adjust [x]  Protein Recommendations: 64-75 gm/day - 1.2-1.4g/kg     Estimated Fluid Needs        Continue []  Adjust [x]  Fluid Recommendations: 1605 mL/day - 30ml/kg     Nutrient Intake:     [x] Previous Nutrition Diagnosis: Inadequate Oral Intake            [x] Ongoing          [] Resolved     Nutrition Education: N/A     Goal/Expected Outcome: Pt to meet >90% & <105% estimated needs via EN in 3-5 days     Indicator/Monitoring: Monitor diet order, kcal intake, body composition, NFPE, labs  (lytes, BG, renal indices)    Recommendation:  Replete formula, 18hour feeds from 12noon to 6AM, start at 20ml/hr and advance by 20ml Q4H to goal 60ml/hr. -- goal will provide 1080ml total volume, 1080kcal, 69g protein, 907ml free water.     Patient assessed at high nutrition risk; RD to follow in 3-5 days.   RD spectra [phone], also available on Teams.

## 2023-10-20 LAB
ALBUMIN SERPL ELPH-MCNC: 2.9 G/DL — LOW (ref 3.5–5.2)
ALBUMIN SERPL ELPH-MCNC: 2.9 G/DL — LOW (ref 3.5–5.2)
ALP SERPL-CCNC: 138 U/L — HIGH (ref 30–115)
ALP SERPL-CCNC: 138 U/L — HIGH (ref 30–115)
ALT FLD-CCNC: 18 U/L — SIGNIFICANT CHANGE UP (ref 0–41)
ALT FLD-CCNC: 18 U/L — SIGNIFICANT CHANGE UP (ref 0–41)
ANION GAP SERPL CALC-SCNC: 7 MMOL/L — SIGNIFICANT CHANGE UP (ref 7–14)
ANION GAP SERPL CALC-SCNC: 7 MMOL/L — SIGNIFICANT CHANGE UP (ref 7–14)
AST SERPL-CCNC: 28 U/L — SIGNIFICANT CHANGE UP (ref 0–41)
AST SERPL-CCNC: 28 U/L — SIGNIFICANT CHANGE UP (ref 0–41)
BASOPHILS # BLD AUTO: 0.07 K/UL — SIGNIFICANT CHANGE UP (ref 0–0.2)
BASOPHILS # BLD AUTO: 0.07 K/UL — SIGNIFICANT CHANGE UP (ref 0–0.2)
BASOPHILS NFR BLD AUTO: 0.7 % — SIGNIFICANT CHANGE UP (ref 0–1)
BASOPHILS NFR BLD AUTO: 0.7 % — SIGNIFICANT CHANGE UP (ref 0–1)
BILIRUB SERPL-MCNC: 0.3 MG/DL — SIGNIFICANT CHANGE UP (ref 0.2–1.2)
BILIRUB SERPL-MCNC: 0.3 MG/DL — SIGNIFICANT CHANGE UP (ref 0.2–1.2)
BUN SERPL-MCNC: 8 MG/DL — LOW (ref 10–20)
BUN SERPL-MCNC: 8 MG/DL — LOW (ref 10–20)
CALCIUM SERPL-MCNC: 8.1 MG/DL — LOW (ref 8.4–10.5)
CALCIUM SERPL-MCNC: 8.1 MG/DL — LOW (ref 8.4–10.5)
CHLORIDE SERPL-SCNC: 99 MMOL/L — SIGNIFICANT CHANGE UP (ref 98–110)
CHLORIDE SERPL-SCNC: 99 MMOL/L — SIGNIFICANT CHANGE UP (ref 98–110)
CO2 SERPL-SCNC: 32 MMOL/L — SIGNIFICANT CHANGE UP (ref 17–32)
CO2 SERPL-SCNC: 32 MMOL/L — SIGNIFICANT CHANGE UP (ref 17–32)
CREAT SERPL-MCNC: 0.6 MG/DL — LOW (ref 0.7–1.5)
CREAT SERPL-MCNC: 0.6 MG/DL — LOW (ref 0.7–1.5)
EGFR: 105 ML/MIN/1.73M2 — SIGNIFICANT CHANGE UP
EGFR: 105 ML/MIN/1.73M2 — SIGNIFICANT CHANGE UP
EOSINOPHIL # BLD AUTO: 0.29 K/UL — SIGNIFICANT CHANGE UP (ref 0–0.7)
EOSINOPHIL # BLD AUTO: 0.29 K/UL — SIGNIFICANT CHANGE UP (ref 0–0.7)
EOSINOPHIL NFR BLD AUTO: 2.9 % — SIGNIFICANT CHANGE UP (ref 0–8)
EOSINOPHIL NFR BLD AUTO: 2.9 % — SIGNIFICANT CHANGE UP (ref 0–8)
GAS PNL BLDA: SIGNIFICANT CHANGE UP
GAS PNL BLDA: SIGNIFICANT CHANGE UP
GLUCOSE SERPL-MCNC: 157 MG/DL — HIGH (ref 70–99)
GLUCOSE SERPL-MCNC: 157 MG/DL — HIGH (ref 70–99)
HCT VFR BLD CALC: 32.9 % — LOW (ref 37–47)
HCT VFR BLD CALC: 32.9 % — LOW (ref 37–47)
HGB BLD-MCNC: 10.1 G/DL — LOW (ref 12–16)
HGB BLD-MCNC: 10.1 G/DL — LOW (ref 12–16)
IMM GRANULOCYTES NFR BLD AUTO: 0.8 % — HIGH (ref 0.1–0.3)
IMM GRANULOCYTES NFR BLD AUTO: 0.8 % — HIGH (ref 0.1–0.3)
LYMPHOCYTES # BLD AUTO: 1.19 K/UL — LOW (ref 1.2–3.4)
LYMPHOCYTES # BLD AUTO: 1.19 K/UL — LOW (ref 1.2–3.4)
LYMPHOCYTES # BLD AUTO: 11.9 % — LOW (ref 20.5–51.1)
LYMPHOCYTES # BLD AUTO: 11.9 % — LOW (ref 20.5–51.1)
MAGNESIUM SERPL-MCNC: 2.1 MG/DL — SIGNIFICANT CHANGE UP (ref 1.8–2.4)
MAGNESIUM SERPL-MCNC: 2.1 MG/DL — SIGNIFICANT CHANGE UP (ref 1.8–2.4)
MCHC RBC-ENTMCNC: 23.5 PG — LOW (ref 27–31)
MCHC RBC-ENTMCNC: 23.5 PG — LOW (ref 27–31)
MCHC RBC-ENTMCNC: 30.7 G/DL — LOW (ref 32–37)
MCHC RBC-ENTMCNC: 30.7 G/DL — LOW (ref 32–37)
MCV RBC AUTO: 76.7 FL — LOW (ref 81–99)
MCV RBC AUTO: 76.7 FL — LOW (ref 81–99)
MONOCYTES # BLD AUTO: 0.73 K/UL — HIGH (ref 0.1–0.6)
MONOCYTES # BLD AUTO: 0.73 K/UL — HIGH (ref 0.1–0.6)
MONOCYTES NFR BLD AUTO: 7.3 % — SIGNIFICANT CHANGE UP (ref 1.7–9.3)
MONOCYTES NFR BLD AUTO: 7.3 % — SIGNIFICANT CHANGE UP (ref 1.7–9.3)
NEUTROPHILS # BLD AUTO: 7.61 K/UL — HIGH (ref 1.4–6.5)
NEUTROPHILS # BLD AUTO: 7.61 K/UL — HIGH (ref 1.4–6.5)
NEUTROPHILS NFR BLD AUTO: 76.4 % — HIGH (ref 42.2–75.2)
NEUTROPHILS NFR BLD AUTO: 76.4 % — HIGH (ref 42.2–75.2)
NRBC # BLD: 0 /100 WBCS — SIGNIFICANT CHANGE UP (ref 0–0)
NRBC # BLD: 0 /100 WBCS — SIGNIFICANT CHANGE UP (ref 0–0)
PLATELET # BLD AUTO: 291 K/UL — SIGNIFICANT CHANGE UP (ref 130–400)
PLATELET # BLD AUTO: 291 K/UL — SIGNIFICANT CHANGE UP (ref 130–400)
PMV BLD: 10.7 FL — HIGH (ref 7.4–10.4)
PMV BLD: 10.7 FL — HIGH (ref 7.4–10.4)
POTASSIUM SERPL-MCNC: 3.7 MMOL/L — SIGNIFICANT CHANGE UP (ref 3.5–5)
POTASSIUM SERPL-MCNC: 3.7 MMOL/L — SIGNIFICANT CHANGE UP (ref 3.5–5)
POTASSIUM SERPL-SCNC: 3.7 MMOL/L — SIGNIFICANT CHANGE UP (ref 3.5–5)
POTASSIUM SERPL-SCNC: 3.7 MMOL/L — SIGNIFICANT CHANGE UP (ref 3.5–5)
PROT SERPL-MCNC: 6 G/DL — SIGNIFICANT CHANGE UP (ref 6–8)
PROT SERPL-MCNC: 6 G/DL — SIGNIFICANT CHANGE UP (ref 6–8)
RBC # BLD: 4.29 M/UL — SIGNIFICANT CHANGE UP (ref 4.2–5.4)
RBC # BLD: 4.29 M/UL — SIGNIFICANT CHANGE UP (ref 4.2–5.4)
RBC # FLD: 25.5 % — HIGH (ref 11.5–14.5)
RBC # FLD: 25.5 % — HIGH (ref 11.5–14.5)
SODIUM SERPL-SCNC: 138 MMOL/L — SIGNIFICANT CHANGE UP (ref 135–146)
SODIUM SERPL-SCNC: 138 MMOL/L — SIGNIFICANT CHANGE UP (ref 135–146)
WBC # BLD: 9.97 K/UL — SIGNIFICANT CHANGE UP (ref 4.8–10.8)
WBC # BLD: 9.97 K/UL — SIGNIFICANT CHANGE UP (ref 4.8–10.8)
WBC # FLD AUTO: 9.97 K/UL — SIGNIFICANT CHANGE UP (ref 4.8–10.8)
WBC # FLD AUTO: 9.97 K/UL — SIGNIFICANT CHANGE UP (ref 4.8–10.8)

## 2023-10-20 PROCEDURE — 95720 EEG PHY/QHP EA INCR W/VEEG: CPT

## 2023-10-20 PROCEDURE — 71045 X-RAY EXAM CHEST 1 VIEW: CPT | Mod: 26,77,76

## 2023-10-20 PROCEDURE — 71045 X-RAY EXAM CHEST 1 VIEW: CPT | Mod: 26

## 2023-10-20 PROCEDURE — 99232 SBSQ HOSP IP/OBS MODERATE 35: CPT

## 2023-10-20 PROCEDURE — 99291 CRITICAL CARE FIRST HOUR: CPT

## 2023-10-20 RX ORDER — HYDROCORTISONE 20 MG
50 TABLET ORAL EVERY 6 HOURS
Refills: 0 | Status: DISCONTINUED | OUTPATIENT
Start: 2023-10-20 | End: 2023-10-21

## 2023-10-20 RX ORDER — LEVETIRACETAM 250 MG/1
500 TABLET, FILM COATED ORAL
Refills: 0 | Status: DISCONTINUED | OUTPATIENT
Start: 2023-10-20 | End: 2023-10-24

## 2023-10-20 RX ORDER — CASPOFUNGIN ACETATE 7 MG/ML
INJECTION, POWDER, LYOPHILIZED, FOR SOLUTION INTRAVENOUS
Refills: 0 | Status: COMPLETED | OUTPATIENT
Start: 2023-10-20 | End: 2023-10-26

## 2023-10-20 RX ORDER — CASPOFUNGIN ACETATE 7 MG/ML
50 INJECTION, POWDER, LYOPHILIZED, FOR SOLUTION INTRAVENOUS EVERY 24 HOURS
Refills: 0 | Status: COMPLETED | OUTPATIENT
Start: 2023-10-21 | End: 2023-10-26

## 2023-10-20 RX ORDER — CASPOFUNGIN ACETATE 7 MG/ML
70 INJECTION, POWDER, LYOPHILIZED, FOR SOLUTION INTRAVENOUS ONCE
Refills: 0 | Status: COMPLETED | OUTPATIENT
Start: 2023-10-20 | End: 2023-10-20

## 2023-10-20 RX ADMIN — Medication 50 MILLIGRAM(S): at 17:51

## 2023-10-20 RX ADMIN — GABAPENTIN 600 MILLIGRAM(S): 400 CAPSULE ORAL at 11:34

## 2023-10-20 RX ADMIN — ENOXAPARIN SODIUM 50 MILLIGRAM(S): 100 INJECTION SUBCUTANEOUS at 17:51

## 2023-10-20 RX ADMIN — CHLORHEXIDINE GLUCONATE 15 MILLILITER(S): 213 SOLUTION TOPICAL at 05:48

## 2023-10-20 RX ADMIN — Medication 1 TABLET(S): at 11:35

## 2023-10-20 RX ADMIN — LACTULOSE 20 GRAM(S): 10 SOLUTION ORAL at 11:36

## 2023-10-20 RX ADMIN — CHLORHEXIDINE GLUCONATE 15 MILLILITER(S): 213 SOLUTION TOPICAL at 17:52

## 2023-10-20 RX ADMIN — LACTULOSE 20 GRAM(S): 10 SOLUTION ORAL at 17:51

## 2023-10-20 RX ADMIN — Medication 50 MILLIGRAM(S): at 09:00

## 2023-10-20 RX ADMIN — MIDODRINE HYDROCHLORIDE 10 MILLIGRAM(S): 2.5 TABLET ORAL at 14:07

## 2023-10-20 RX ADMIN — MEROPENEM 100 MILLIGRAM(S): 1 INJECTION INTRAVENOUS at 17:53

## 2023-10-20 RX ADMIN — MIDODRINE HYDROCHLORIDE 10 MILLIGRAM(S): 2.5 TABLET ORAL at 05:50

## 2023-10-20 RX ADMIN — MEROPENEM 100 MILLIGRAM(S): 1 INJECTION INTRAVENOUS at 10:48

## 2023-10-20 RX ADMIN — MEROPENEM 100 MILLIGRAM(S): 1 INJECTION INTRAVENOUS at 02:12

## 2023-10-20 RX ADMIN — CHLORHEXIDINE GLUCONATE 1 APPLICATION(S): 213 SOLUTION TOPICAL at 05:48

## 2023-10-20 RX ADMIN — Medication 4.68 MICROGRAM(S)/KG/MIN: at 21:48

## 2023-10-20 RX ADMIN — PANTOPRAZOLE SODIUM 40 MILLIGRAM(S): 20 TABLET, DELAYED RELEASE ORAL at 17:52

## 2023-10-20 RX ADMIN — LEVETIRACETAM 500 MILLIGRAM(S): 250 TABLET, FILM COATED ORAL at 18:49

## 2023-10-20 RX ADMIN — MIDODRINE HYDROCHLORIDE 10 MILLIGRAM(S): 2.5 TABLET ORAL at 21:47

## 2023-10-20 RX ADMIN — Medication 50 MILLIGRAM(S): at 13:02

## 2023-10-20 RX ADMIN — POLYETHYLENE GLYCOL 3350 17 GRAM(S): 17 POWDER, FOR SOLUTION ORAL at 11:36

## 2023-10-20 RX ADMIN — PANTOPRAZOLE SODIUM 40 MILLIGRAM(S): 20 TABLET, DELAYED RELEASE ORAL at 05:48

## 2023-10-20 RX ADMIN — ENOXAPARIN SODIUM 50 MILLIGRAM(S): 100 INJECTION SUBCUTANEOUS at 05:48

## 2023-10-20 RX ADMIN — CASPOFUNGIN ACETATE 260 MILLIGRAM(S): 7 INJECTION, POWDER, LYOPHILIZED, FOR SOLUTION INTRAVENOUS at 09:51

## 2023-10-20 RX ADMIN — RALOXIFENE HYDROCHLORIDE 60 MILLIGRAM(S): 60 TABLET, COATED ORAL at 11:35

## 2023-10-20 NOTE — PROGRESS NOTE ADULT - SUBJECTIVE AND OBJECTIVE BOX
JERICHO TEA  58y, Female  Allergy: No Known Allergies      LOS  6d    CHIEF COMPLAINT: Pre-syncope (20 Oct 2023 06:26)      INTERVAL EVENTS/HPI  - No acute events overnight  - T(F): , Max: 100 (10-19-23 @ 20:00)  - fungitell elevated - started on caspo  - WBC Count: 9.97 (10-20-23 @ 05:05)  WBC Count: 13.25 (10-19-23 @ 11:05)     - Creatinine: 0.6 (10-20-23 @ 05:05)  Creatinine: 0.5 (10-19-23 @ 05:00)       ROS  unable to obtain history secondary to patient's mental status and/or sedation    VITALS:  T(F): 98.4, Max: 100 (10-19-23 @ 20:00)  HR: 52  BP: 118/56  RR: 14Vital Signs Last 24 Hrs  T(C): 36.9 (20 Oct 2023 08:00), Max: 37.8 (19 Oct 2023 20:00)  T(F): 98.4 (20 Oct 2023 08:00), Max: 100 (19 Oct 2023 20:00)  HR: 52 (20 Oct 2023 08:00) (50 - 83)  BP: 118/56 (20 Oct 2023 08:00) (84/47 - 136/63)  BP(mean): 80 (20 Oct 2023 08:00) (62 - 93)  RR: 14 (20 Oct 2023 08:00) (14 - 24)  SpO2: 96% (20 Oct 2023 08:00) (92% - 99%)    Parameters below as of 20 Oct 2023 07:00  Patient On (Oxygen Delivery Method): ventilator    O2 Concentration (%): 45    PHYSICAL EXAM:  Gen: intubated  HEENT: Normocephalic, atraumatic  Neck: supple, no lymphadenopathy  CV: Regular rate & regular rhythm  Lungs: decreased BS at bases, no fremitus  Abdomen: Soft, BS present  Ext: Warm, well perfused  Neuro: non focal, awake  Skin: no rash, no erythema  Lines: no phlebitis    FH: Non-contributory  Social Hx: Non-contributory    TESTS & MEASUREMENTS:                        10.1   9.97  )-----------( 291      ( 20 Oct 2023 05:05 )             32.9     10-20    138  |  99  |  8<L>  ----------------------------<  157<H>  3.7   |  32  |  0.6<L>    Ca    8.1<L>      20 Oct 2023 05:05  Mg     2.1     10-20    TPro  6.0  /  Alb  2.9<L>  /  TBili  0.3  /  DBili  x   /  AST  28  /  ALT  18  /  AlkPhos  138<H>  10-20      LIVER FUNCTIONS - ( 20 Oct 2023 05:05 )  Alb: 2.9 g/dL / Pro: 6.0 g/dL / ALK PHOS: 138 U/L / ALT: 18 U/L / AST: 28 U/L / GGT: x           Urinalysis Basic - ( 20 Oct 2023 05:05 )    Color: x / Appearance: x / SG: x / pH: x  Gluc: 157 mg/dL / Ketone: x  / Bili: x / Urobili: x   Blood: x / Protein: x / Nitrite: x   Leuk Esterase: x / RBC: x / WBC x   Sq Epi: x / Non Sq Epi: x / Bacteria: x        Culture - Urine (collected 10-17-23 @ 19:37)  Source: Clean Catch Clean Catch (Midstream)  Final Report (10-19-23 @ 00:23):    No growth    Culture - Sputum (collected 10-17-23 @ 19:37)  Source: Trach Asp Tracheal Aspirate  Gram Stain (10-18-23 @ 02:39):    Numerous polymorphonuclear leukocytes per low power field    No Squamous epithelial cells per low power field    Moderate Yeast like cells seen per oil power field  Final Report (10-19-23 @ 21:17):    Normal Respiratory Mili present    Culture - Blood (collected 10-17-23 @ 12:35)  Source: .Blood Blood  Preliminary Report (10-19-23 @ 22:02):    No growth at 48 Hours    Culture - Sputum (collected 10-04-23 @ 12:00)  Source: Trach Asp Tracheal Aspirate  Gram Stain (10-04-23 @ 22:58):    Few polymorphonuclear leukocytes per low power field    Rare Squamous epithelial cells per low power field    No organisms seen per oil power field  Final Report (10-06-23 @ 10:31):    No growth    Culture - Sputum (collected 10-02-23 @ 11:10)  Source: Trach Asp Tracheal Aspirate  Gram Stain (10-03-23 @ 07:33):    Moderate polymorphonuclear leukocytes per low power field    Rare Squamous epithelial cells per low power field    Few Gram positive cocci in pairs seen per oil power field    Rare Gram Positive Rods seen per oil power field    Rare Yeast like cells seen per oil power field  Final Report (10-04-23 @ 17:12):    No growth at 48 hours    Culture - Urine (collected 09-29-23 @ 10:10)  Source: Clean Catch Clean Catch (Midstream)  Final Report (10-01-23 @ 01:13):    <10,000 CFU/mL Normal Urogenital Mili    Culture - Blood (collected 09-29-23 @ 09:47)  Source: .Blood Blood-Peripheral  Final Report (10-04-23 @ 17:00):    No growth at 5 days    Culture - Blood (collected 09-29-23 @ 09:47)  Source: .Blood Blood-Peripheral  Gram Stain (10-01-23 @ 09:15):    Growth in aerobic bottle: Gram Positive Rods and Gram positive cocci in    pairs    Growth in anaerobic bottle: Gram positive cocci in pairs  Final Report (10-05-23 @ 22:52):    Growth in aerobic bottle: Corynebacterium amycolatum "Susceptibilities    not performed"    Growth in anaerobic bottle: Peptoniphilus harei group "Susceptibilities    not performed"    Growth in aerobic bottle: Gram positive cocci in pairs    Organism seen in Gram stain is non-viable after prolonged    incubation and repeated subculture.    Direct identification is available within approximately 3-5    hours either by Blood Panel Multiplexed PCR or Direct    MALDI-TOF. Details: [email or web address]  Organism: Blood Culture PCR (10-05-23 @ 22:52)  Organism: Blood Culture PCR (10-05-23 @ 22:52)      Method Type: PCR      -  Blood PCR Panel: NEG            INFECTIOUS DISEASES TESTING  MRSA PCR Result.: Negative (10-17-23 @ 17:20)  Fungitell: >500 (10-17-23 @ 17:20)  Procalcitonin, Serum: 4.36 (10-17-23 @ 17:20)  Procalcitonin, Serum: 4.18 (10-17-23 @ 12:35)  Procalcitonin, Serum: 0.07 (10-15-23 @ 07:28)  COVID-19 PCR: NotDetec (10-12-23 @ 09:14)  Procalcitonin, Serum: 0.40 (10-07-23 @ 10:54)  Legionella Antigen, Urine: Negative (09-30-23 @ 14:36)  MRSA PCR Result.: Negative (09-29-23 @ 16:50)  Procalcitonin, Serum: 9.78 (08-12-23 @ 11:25)  MRSA PCR Result.: Negative (08-12-23 @ 06:10)  Rapid RVP Result: NotDetec (08-12-23 @ 01:33)  Procalcitonin, Serum: 0.63 (08-10-23 @ 08:46)  Procalcitonin, Serum: 7.82 (08-04-23 @ 16:13)  MRSA PCR Result.: Negative (08-04-23 @ 14:52)  Rapid RVP Result: NotDetec (08-04-23 @ 03:00)      INFLAMMATORY MARKERS  Sedimentation Rate, Erythrocyte: 67 mm/Hr (10-15-23 @ 07:28)  C-Reactive Protein, Serum: 16.1 mg/L (10-15-23 @ 07:28)      RADIOLOGY & ADDITIONAL TESTS:  I have personally reviewed the last available Chest xray  CXR  Xray Chest 1 View- PORTABLE-Urgent:   ACC: [id number] EXAM:  XR CHEST PORTABLE URGENT 1V   ORDERED BY: JANIS PLATT     PROCEDURE DATE:  10/17/2023          INTERPRETATION:  Clinical History / Reason for exam: Intubated    Comparison : Chest radiograph 10/17/2023.    Technique/Positioning: Frontal chest radiograph.    Findings/  impression:    Support devices: Endotracheal tube in satisfactory position. Feeding tube   tip overlying the left upper quadrant. Stable right-sided central venous   catheter.    Cardiac/mediastinum/hilum:Unchanged.    Lung parenchyma/Pleura: Bilateral opacities and left effusion without   significant change. No pneumothorax.    Skeleton/soft tissues: Unchanged        --- End of Report ---            CHANA GIL MD; Attending Radiologist  This document has been electronically signed. Oct 18 2023  9:25AM (10-17-23 @ 18:49)      CT  CT Angio Chest PE Protocol w/ IV Cont:   ACC: [id number] EXAM:  CT ANGIO CHEST PULM ART WAWIC   ORDERED BY:   RIK TARIQ     PROCEDURE DATE:  10/18/2023          INTERPRETATION:  Possible pulmonary embolism.    Technique:  Approximately 80 cc of Optiray 320 was administered   intravenously, followed by approximate 50 cc saline flush.    Multiple transaxial images of the thorax was obtained, utilizing   pulmonary embolism protocol.  20 cc of contrast material was discarded.    Coronal and sagittal reformatted images were performed. 3-D MIP imaging   was performed.    Comparison: CT thorax October 1, 2023.    Findings:    Pulmonary circulation:    No pulmonary emboli are identified  Tubes/lines:Patient is intubated. Enteric catheter extends below the   hemidiaphragm. Right neck central catheter.    Central airways/ Lung parenchyma/ pleura :Basilar opacifications are seen   with complete atelectasis of the left lower lung field. Groundglass   opacifications and nodularity at both apices, right greater than left.    Chest wall/axilla: unremarkable    Mediastinum/hilum:No lymphadenopathy.    Great vessels/cardiac structures:Heart size is normal.    Upper abdomen:Within normal limits.    Osseous structures:Within normal limits.      .    Impression:    No CT evidence of pulmonary embolism.    Bilateral pneumonia.    Support devices as described.    --- End of Report ---            FAYE JOHNSON MD; Attending Interventional Radiologist  This document has been electronically signed. Oct 18 2023 10:53AM (10-18-23 @ 10:39)      CARDIOLOGY TESTING  12 Lead ECG:   Ventricular Rate 115 BPM    Atrial Rate 115 BPM    P-R Interval 130 ms    QRS Duration 68 ms    Q-T Interval 348 ms    QTC Calculation(Bazett) 481 ms    P Axis 43 degrees    R Axis 99 degrees    T Axis 22 degrees    Diagnosis Line Sinus tachycardia  Rightward axis  Otherwise normal ECG    Confirmed by Gordo Mckee ([id number]) on 10/16/2023 12:41:36 PM (10-14-23 @ 16:43)  12 Lead ECG:   Ventricular Rate 152 BPM    Atrial Rate 107 BPM    P-R Interval 98 ms    QRS Duration 68 ms    Q-T Interval 320 ms    QTC Calculation(Bazett) 508 ms    P Axis 21 degrees    R Axis 86 degrees    T Axis 29 degrees    Diagnosis Line Normal sinus rhythm  Cannot rule out Anterior infarct , age undetermined  Abnormal ECG  *** Poor data quality, interpretation may be adversely affected  Confirmed by Amol Lopez ([id number]) on 10/15/2023 2:06:47 PM (10-14-23 @ 16:42)      MEDICATIONS  caspofungin IVPB     caspofungin IVPB 70 IV Intermittent once  chlorhexidine 0.12% Liquid 15 Oral Mucosa every 12 hours  chlorhexidine 2% Cloths 1 Topical <User Schedule>  dexMEDEtomidine Infusion 0.5 IV Continuous <Continuous>  enoxaparin Injectable 50 SubCutaneous every 12 hours  fentaNYL   Infusion. 0.5 IV Continuous <Continuous>  gabapentin 600 Oral daily  hydrocortisone sodium succinate Injectable 50 IV Push every 6 hours  lactulose Syrup 20 Oral every 6 hours  meropenem  IVPB 1000 IV Intermittent every 8 hours  midodrine 10 Oral every 8 hours  multivitamin 1 Oral daily  norepinephrine Infusion 0.05 IV Continuous <Continuous>  pantoprazole   Suspension 40 Oral two times a day  polyethylene glycol 3350 17 Oral daily  raloxifene 60 Oral daily      WEIGHT  Weight (kg): 60.6 (10-17-23 @ 19:00)  Creatinine: 0.6 mg/dL (10-20-23 @ 05:05)      ANTIBIOTICS:  caspofungin IVPB 70 milliGRAM(s) IV Intermittent once  caspofungin IVPB      meropenem  IVPB 1000 milliGRAM(s) IV Intermittent every 8 hours      All available historical records have been reviewed

## 2023-10-20 NOTE — PROGRESS NOTE ADULT - SUBJECTIVE AND OBJECTIVE BOX
Over Night Events: events noted, remain critically ill, still intubated, ventilated, low grade fever    PHYSICAL EXAM    ICU Vital Signs Last 24 Hrs  T(C): 37.8 (19 Oct 2023 20:00), Max: 37.8 (19 Oct 2023 20:00)  T(F): 100 (19 Oct 2023 20:00), Max: 100 (19 Oct 2023 20:00)  HR: 54 (20 Oct 2023 06:00) (50 - 83)  BP: 127/60 (20 Oct 2023 06:00) (84/47 - 127/60)  BP(mean): 86 (20 Oct 2023 06:00) (62 - 86)  RR: 14 (20 Oct 2023 06:00) (14 - 24)  SpO2: 98% (20 Oct 2023 06:00) (92% - 99%)    O2 Parameters below as of 20 Oct 2023 06:00  Patient On (Oxygen Delivery Method): ventilator    O2 Concentration (%): 45        General: ill looking  ETT  Lungs: dec bs both bases  Cardiovascular: HARPREET 2.6  Abdomen: Soft, Positive BS  Extremities: No clubbing   Contracted  not following commands      10-18-23 @ 07:01  -  10-19-23 @ 07:00  --------------------------------------------------------  IN:    FentaNYL: 165 mL    IV PiggyBack: 300 mL    Norepinephrine: 301.1 mL  Total IN: 766.1 mL    OUT:    Dexmedetomidine: 0 mL    Indwelling Catheter - Urethral (mL): 755 mL  Total OUT: 755 mL    Total NET: 11.1 mL      10-19-23 @ 07:01  -  10-20-23 @ 06:26  --------------------------------------------------------  IN:    Enteral Tube Flush: 200 mL    FentaNYL: 217.2 mL    IV PiggyBack: 150 mL    Miscellaneous Tube Feedin mL    Norepinephrine: 375.2 mL  Total IN: 1482.4 mL    OUT:    Indwelling Catheter - Urethral (mL): 674 mL    Rectal Tube (mL): 300 mL  Total OUT: 974 mL    Total NET: 508.4 mL          LABS:                          10.1   9.97  )-----------( 291      ( 20 Oct 2023 05:05 )             32.9                                               10-20    138  |  99  |  8<L>  ----------------------------<  157<H>  3.7   |  32  |  0.6<L>    Ca    8.1<L>      20 Oct 2023 05:05  Mg     2.1     10-20    TPro  6.0  /  Alb  2.9<L>  /  TBili  0.3  /  DBili  x   /  AST  28  /  ALT  18  /  AlkPhos  138<H>  10-20                                             Urinalysis Basic - ( 20 Oct 2023 05:05 )    Color: x / Appearance: x / SG: x / pH: x  Gluc: 157 mg/dL / Ketone: x  / Bili: x / Urobili: x   Blood: x / Protein: x / Nitrite: x   Leuk Esterase: x / RBC: x / WBC x   Sq Epi: x / Non Sq Epi: x / Bacteria: x                                                  LIVER FUNCTIONS - ( 20 Oct 2023 05:05 )  Alb: 2.9 g/dL / Pro: 6.0 g/dL / ALK PHOS: 138 U/L / ALT: 18 U/L / AST: 28 U/L / GGT: x                                                  Culture - Urine (collected 17 Oct 2023 19:37)  Source: Clean Catch Clean Catch (Midstream)  Final Report (19 Oct 2023 00:23):    No growth    Culture - Sputum (collected 17 Oct 2023 19:37)  Source: Trach Asp Tracheal Aspirate  Gram Stain (18 Oct 2023 02:39):    Numerous polymorphonuclear leukocytes per low power field    No Squamous epithelial cells per low power field    Moderate Yeast like cells seen per oil power field  Final Report (19 Oct 2023 21:17):    Normal Respiratory Mili present    Culture - Blood (collected 17 Oct 2023 12:35)  Source: .Blood Blood  Preliminary Report (19 Oct 2023 22:02):    No growth at 48 Hours                                                   Mode: AC/ CMV (Assist Control/ Continuous Mandatory Ventilation)  RR (machine): 14  TV (machine): 300  FiO2: 45  PEEP: 8  ITime: 1  MAP: 11  PIP: 22                                      ABG - ( 20 Oct 2023 03:22 )  pH, Arterial: 7.46  pH, Blood: x     /  pCO2: 48    /  pO2: 107   / HCO3: 34    / Base Excess: 9.1   /  SaO2: 98.8                MEDICATIONS  (STANDING):  chlorhexidine 0.12% Liquid 15 milliLiter(s) Oral Mucosa every 12 hours  chlorhexidine 2% Cloths 1 Application(s) Topical <User Schedule>  dexMEDEtomidine Infusion 0.5 MICROgram(s)/kG/Hr (6.24 mL/Hr) IV Continuous <Continuous>  enoxaparin Injectable 50 milliGRAM(s) SubCutaneous every 12 hours  fentaNYL   Infusion. 0.5 MICROgram(s)/kG/Hr (2.5 mL/Hr) IV Continuous <Continuous>  gabapentin 600 milliGRAM(s) Oral daily  lactulose Syrup 20 Gram(s) Oral every 6 hours  meropenem  IVPB 1000 milliGRAM(s) IV Intermittent every 8 hours  midodrine 10 milliGRAM(s) Oral every 8 hours  multivitamin 1 Tablet(s) Oral daily  norepinephrine Infusion 0.05 MICROgram(s)/kG/Min (4.68 mL/Hr) IV Continuous <Continuous>  pantoprazole   Suspension 40 milliGRAM(s) Oral two times a day  polyethylene glycol 3350 17 Gram(s) Oral daily  raloxifene 60 milliGRAM(s) Oral daily    MEDICATIONS  (PRN):  acetaminophen     Tablet .. 650 milliGRAM(s) Oral every 6 hours PRN Temp greater or equal to 38C (100.4F), Mild Pain (1 - 3)    cxr noted   Over Night Events: events noted, remain critically ill, still intubated, ventilated, low grade fever, on fentanyl, levophed 0.14, fungitell noted    PHYSICAL EXAM    ICU Vital Signs Last 24 Hrs  T(C): 37.8 (19 Oct 2023 20:00), Max: 37.8 (19 Oct 2023 20:00)  T(F): 100 (19 Oct 2023 20:00), Max: 100 (19 Oct 2023 20:00)  HR: 54 (20 Oct 2023 06:00) (50 - 83)  BP: 127/60 (20 Oct 2023 06:00) (84/47 - 127/60)  BP(mean): 86 (20 Oct 2023 06:00) (62 - 86)  RR: 14 (20 Oct 2023 06:00) (14 - 24)  SpO2: 98% (20 Oct 2023 06:00) (92% - 99%)    O2 Parameters below as of 20 Oct 2023 06:00  Patient On (Oxygen Delivery Method): ventilator    O2 Concentration (%): 45        General: ill looking  ETT  Lungs: dec bs both bases  Cardiovascular: HARPREET 2.6  Abdomen: Soft, Positive BS  Extremities: No clubbing   Contracted  not following commands      10-18-23 @ 07:01  -  10-19-23 @ 07:00  --------------------------------------------------------  IN:    FentaNYL: 165 mL    IV PiggyBack: 300 mL    Norepinephrine: 301.1 mL  Total IN: 766.1 mL    OUT:    Dexmedetomidine: 0 mL    Indwelling Catheter - Urethral (mL): 755 mL  Total OUT: 755 mL    Total NET: 11.1 mL      10-19-23 @ 07:01  -  10-20-23 @ 06:26  --------------------------------------------------------  IN:    Enteral Tube Flush: 200 mL    FentaNYL: 217.2 mL    IV PiggyBack: 150 mL    Miscellaneous Tube Feedin mL    Norepinephrine: 375.2 mL  Total IN: 1482.4 mL    OUT:    Indwelling Catheter - Urethral (mL): 674 mL    Rectal Tube (mL): 300 mL  Total OUT: 974 mL    Total NET: 508.4 mL          LABS:                          10.1   9.97  )-----------( 291      ( 20 Oct 2023 05:05 )             32.9                                               10-20    138  |  99  |  8<L>  ----------------------------<  157<H>  3.7   |  32  |  0.6<L>    Ca    8.1<L>      20 Oct 2023 05:05  Mg     2.1     10-20    TPro  6.0  /  Alb  2.9<L>  /  TBili  0.3  /  DBili  x   /  AST  28  /  ALT  18  /  AlkPhos  138<H>  10-20                                             Urinalysis Basic - ( 20 Oct 2023 05:05 )    Color: x / Appearance: x / SG: x / pH: x  Gluc: 157 mg/dL / Ketone: x  / Bili: x / Urobili: x   Blood: x / Protein: x / Nitrite: x   Leuk Esterase: x / RBC: x / WBC x   Sq Epi: x / Non Sq Epi: x / Bacteria: x                                                  LIVER FUNCTIONS - ( 20 Oct 2023 05:05 )  Alb: 2.9 g/dL / Pro: 6.0 g/dL / ALK PHOS: 138 U/L / ALT: 18 U/L / AST: 28 U/L / GGT: x                                                  Culture - Urine (collected 17 Oct 2023 19:37)  Source: Clean Catch Clean Catch (Midstream)  Final Report (19 Oct 2023 00:23):    No growth    Culture - Sputum (collected 17 Oct 2023 19:37)  Source: Trach Asp Tracheal Aspirate  Gram Stain (18 Oct 2023 02:39):    Numerous polymorphonuclear leukocytes per low power field    No Squamous epithelial cells per low power field    Moderate Yeast like cells seen per oil power field  Final Report (19 Oct 2023 21:17):    Normal Respiratory Mili present    Culture - Blood (collected 17 Oct 2023 12:35)  Source: .Blood Blood  Preliminary Report (19 Oct 2023 22:02):    No growth at 48 Hours                                                   Mode: AC/ CMV (Assist Control/ Continuous Mandatory Ventilation)  RR (machine): 14  TV (machine): 300  FiO2: 45  PEEP: 8  ITime: 1  MAP: 11  PIP: 22                                      ABG - ( 20 Oct 2023 03:22 )  pH, Arterial: 7.46  pH, Blood: x     /  pCO2: 48    /  pO2: 107   / HCO3: 34    / Base Excess: 9.1   /  SaO2: 98.8                MEDICATIONS  (STANDING):  chlorhexidine 0.12% Liquid 15 milliLiter(s) Oral Mucosa every 12 hours  chlorhexidine 2% Cloths 1 Application(s) Topical <User Schedule>  dexMEDEtomidine Infusion 0.5 MICROgram(s)/kG/Hr (6.24 mL/Hr) IV Continuous <Continuous>  enoxaparin Injectable 50 milliGRAM(s) SubCutaneous every 12 hours  fentaNYL   Infusion. 0.5 MICROgram(s)/kG/Hr (2.5 mL/Hr) IV Continuous <Continuous>  gabapentin 600 milliGRAM(s) Oral daily  lactulose Syrup 20 Gram(s) Oral every 6 hours  meropenem  IVPB 1000 milliGRAM(s) IV Intermittent every 8 hours  midodrine 10 milliGRAM(s) Oral every 8 hours  multivitamin 1 Tablet(s) Oral daily  norepinephrine Infusion 0.05 MICROgram(s)/kG/Min (4.68 mL/Hr) IV Continuous <Continuous>  pantoprazole   Suspension 40 milliGRAM(s) Oral two times a day  polyethylene glycol 3350 17 Gram(s) Oral daily  raloxifene 60 milliGRAM(s) Oral daily    MEDICATIONS  (PRN):  acetaminophen     Tablet .. 650 milliGRAM(s) Oral every 6 hours PRN Temp greater or equal to 38C (100.4F), Mild Pain (1 - 3)    cxr noted

## 2023-10-20 NOTE — PROGRESS NOTE ADULT - SUBJECTIVE AND OBJECTIVE BOX
24H events:    Patient is a 58y old Female who presents with a chief complaint of Pre-syncope (20 Oct 2023 08:23)    Primary diagnosis of Pre-syncope      Day 1:  Day 2:  Day 3:     Today is hospital day 6d. This morning patient was seen and examined at bedside, resting comfortably in bed.    OVN, patient had several large watery nonbloody BM, senna held.     Code Status:    Family communication:  Contact date:  Name of person contacted:  Relationship to patient:  Communication details:  What matters most:    PAST MEDICAL & SURGICAL HISTORY  Down syndrome    Osteoporosis    Mild anemia    Neuropathy    S/P debridement  of R hip on 3/2/21      SOCIAL HISTORY:  Social History:  Lives at nursing home (14 Oct 2023 20:33)      ALLERGIES:  No Known Allergies    MEDICATIONS:  STANDING MEDICATIONS  caspofungin IVPB      caspofungin IVPB 70 milliGRAM(s) IV Intermittent once  chlorhexidine 0.12% Liquid 15 milliLiter(s) Oral Mucosa every 12 hours  chlorhexidine 2% Cloths 1 Application(s) Topical <User Schedule>  dexMEDEtomidine Infusion 0.5 MICROgram(s)/kG/Hr IV Continuous <Continuous>  enoxaparin Injectable 50 milliGRAM(s) SubCutaneous every 12 hours  fentaNYL   Infusion. 0.5 MICROgram(s)/kG/Hr IV Continuous <Continuous>  gabapentin 600 milliGRAM(s) Oral daily  hydrocortisone sodium succinate Injectable 50 milliGRAM(s) IV Push every 6 hours  lactulose Syrup 20 Gram(s) Oral every 6 hours  meropenem  IVPB 1000 milliGRAM(s) IV Intermittent every 8 hours  midodrine 10 milliGRAM(s) Oral every 8 hours  multivitamin 1 Tablet(s) Oral daily  norepinephrine Infusion 0.05 MICROgram(s)/kG/Min IV Continuous <Continuous>  pantoprazole   Suspension 40 milliGRAM(s) Oral two times a day  polyethylene glycol 3350 17 Gram(s) Oral daily  raloxifene 60 milliGRAM(s) Oral daily    PRN MEDICATIONS  acetaminophen     Tablet .. 650 milliGRAM(s) Oral every 6 hours PRN    VITALS:   T(F): 98.4  HR: 52  BP: 118/56  RR: 14  SpO2: 96%    PHYSICAL EXAM:  GENERAL: intubated and sedated  (x  ) NAD, lying in bed comfortably     (  ) obtunded     (  ) lethargic     (  ) somnolent    HEAD:   ( x ) Atraumatic     (  ) hematoma     (  ) laceration (specify location:       )     NECK:  ( x ) Supple     (  ) neck stiffness     (  ) nuchal rigidity     (  )  no JVD     (  ) JVD present ( -- cm)    HEART:  Rate -->     (x  ) normal rate     (  ) bradycardic     (  ) tachycardic  Rhythm -->     (x  ) regular     (  ) regularly irregular     (  ) irregularly irregular  Murmurs -->     (  ) normal s1s2     (  ) systolic murmur     (  ) diastolic murmur     (  ) continuous murmur      (  ) S3 present     (  ) S4 present    LUNGS:   ( x )Unlabored respirations     (  ) tachypnea  ( x ) B/L air entry     (  ) decreased breath sounds in:  (location     )    (  ) no adventitious sound     (  ) crackles     (  ) wheezing      (  ) rhonchi      (specify location:       )  (  ) chest wall tenderness (specify location:       )    ABDOMEN:   ( x ) Soft     (  ) tense   |   ( x ) nondistended     (  ) distended   |   (  ) +BS     (  ) hypoactive bowel sounds     (  ) hyperactive bowel sounds  (x  ) nontender     (  ) RUQ tenderness     (  ) RLQ tenderness     (  ) LLQ tenderness     (  ) epigastric tenderness     (  ) diffuse tenderness  (  ) Splenomegaly      (  ) Hepatomegaly      (  ) Jaundice     (  ) ecchymosis     EXTREMITIES: contracted  (  ) Normal     (  ) Rash     (  ) ecchymosis     (  ) varicose veins      (  ) pitting edema     (  ) non-pitting edema   (  ) ulceration     (  ) gangrene:     (location:     )    NERVOUS SYSTEM:  sedated  (  ) A&Ox3     (  ) confused     (  ) lethargic  CN II-XII:     (  ) Intact     (  ) deficits found     (Specify:     )   Upper extremities:     (  ) no sensorimotor deficits     (  ) weakness     (  ) loss of proprioception/vibration     (  ) loss of touch/temperature (specify:    )  Lower extremities:     (  ) no sensorimotor deficits     (  ) weakness     (  ) loss of proprioception/vibration     (  ) loss of touch/temperature (specify:    )    SKIN:   (  ) No rashes or lesions     (  ) maculopapular rash     (  ) pustules     (  ) vesicles     (  ) ulcer     (  ) ecchymosis     (specify location:     )    AMPAC score:    (  ) Indwelling Madsen Catheter:   Date insterted:  teja 10/17, dc today   Reason (  ) Critical illness     (  ) urinary retention    (  ) Accurate Ins/Outs Monitoring     (  ) CMO patient    ( x ) Central Line:   Date inserted: 10/17  Location: ( x ) Right IJ     (  ) Left IJ     (  ) Right Fem     (  ) Left Fem    (  ) SPC        (  ) pigtail       (  ) PEG tube       (  ) colostomy       (  ) jejunostomy  (  ) U-Dall    LABS:                        10.1   9.97  )-----------( 291      ( 20 Oct 2023 05:05 )             32.9     10-20    138  |  99  |  8<L>  ----------------------------<  157<H>  3.7   |  32  |  0.6<L>    Ca    8.1<L>      20 Oct 2023 05:05  Mg     2.1     10-20    TPro  6.0  /  Alb  2.9<L>  /  TBili  0.3  /  DBili  x   /  AST  28  /  ALT  18  /  AlkPhos  138<H>  10-20      Urinalysis Basic - ( 20 Oct 2023 05:05 )    Color: x / Appearance: x / SG: x / pH: x  Gluc: 157 mg/dL / Ketone: x  / Bili: x / Urobili: x   Blood: x / Protein: x / Nitrite: x   Leuk Esterase: x / RBC: x / WBC x   Sq Epi: x / Non Sq Epi: x / Bacteria: x      ABG - ( 20 Oct 2023 03:22 )  pH, Arterial: 7.46  pH, Blood: x     /  pCO2: 48    /  pO2: 107   / HCO3: 34    / Base Excess: 9.1   /  SaO2: 98.8                  Culture - Urine (collected 17 Oct 2023 19:37)  Source: Clean Catch Clean Catch (Midstream)  Final Report (19 Oct 2023 00:23):    No growth    Culture - Sputum (collected 17 Oct 2023 19:37)  Source: Trach Asp Tracheal Aspirate  Gram Stain (18 Oct 2023 02:39):    Numerous polymorphonuclear leukocytes per low power field    No Squamous epithelial cells per low power field    Moderate Yeast like cells seen per oil power field  Final Report (19 Oct 2023 21:17):    Normal Respiratory Mili present    Culture - Blood (collected 17 Oct 2023 12:35)  Source: .Blood Blood  Preliminary Report (19 Oct 2023 22:02):    No growth at 48 Hours          RADIOLOGY:

## 2023-10-20 NOTE — PROGRESS NOTE ADULT - SUBJECTIVE AND OBJECTIVE BOX
Pt needs referral for PFT, lilo will mail to her    NEUROLOGY CONSULT PROGRESS NOTE    INTERVAL HISTORY: No significant changes in neurological status and exam. Now on vEEG.    REVIEW OF SYSTEMS:  As per HPI, otherwise negative for Constitutional, Eyes, Ears/Nose/Mouth/Throat, Neck, Cardiovascular, Respiratory, Gastrointestinal, Genitourinary, Skin, Endocrine, Musculoskeletal, Psychiatric, and Hematologic/Lymphatic.    MEDICATIONS:  acetaminophen     Tablet .. 650 milliGRAM(s) Oral every 6 hours PRN  caspofungin IVPB      chlorhexidine 0.12% Liquid 15 milliLiter(s) Oral Mucosa every 12 hours  chlorhexidine 2% Cloths 1 Application(s) Topical <User Schedule>  dexMEDEtomidine Infusion 0.5 MICROgram(s)/kG/Hr IV Continuous <Continuous>  enoxaparin Injectable 50 milliGRAM(s) SubCutaneous every 12 hours  fentaNYL   Infusion. 0.5 MICROgram(s)/kG/Hr IV Continuous <Continuous>  gabapentin 600 milliGRAM(s) Oral daily  hydrocortisone sodium succinate Injectable 50 milliGRAM(s) IV Push every 6 hours  lactulose Syrup 20 Gram(s) Oral every 6 hours  meropenem  IVPB 1000 milliGRAM(s) IV Intermittent every 8 hours  midodrine 10 milliGRAM(s) Oral every 8 hours  multivitamin 1 Tablet(s) Oral daily  norepinephrine Infusion 0.05 MICROgram(s)/kG/Min IV Continuous <Continuous>  pantoprazole   Suspension 40 milliGRAM(s) Oral two times a day  polyethylene glycol 3350 17 Gram(s) Oral daily  raloxifene 60 milliGRAM(s) Oral daily    VITAL SIGNS:  Vital Signs Last 24 Hrs  T(C): 36.9 (20 Oct 2023 08:00), Max: 37.8 (19 Oct 2023 20:00)  T(F): 98.4 (20 Oct 2023 08:00), Max: 100 (19 Oct 2023 20:00)  HR: 52 (20 Oct 2023 11:00) (50 - 76)  BP: 131/63 (20 Oct 2023 11:00) (84/47 - 136/63)  BP(mean): 91 (20 Oct 2023 11:00) (62 - 93)  RR: 14 (20 Oct 2023 11:00) (14 - 24)  SpO2: 97% (20 Oct 2023 11:00) (92% - 99%)    Parameters below as of 20 Oct 2023 07:00  Patient On (Oxygen Delivery Method): ventilator    O2 Concentration (%): 45    PHYSICAL EXAMINATION:  Mental status: intubated, awake. She is not able to follow any commands, (nonverbal at baseline).   Cranial nerves: Pupils equally round and reactive to light, no nystagmus, medial deviation of the Rt eye (baseline as per medical staff), face symmetric.   Motor: she move all her limbs for noxious stimuli. No abnormal movements. Contractures in all 4 limbs.   Sensation: react to noxious stimuli all 4 limbs   Reflexes: 1+ in bilateral UE/LE    LABS:                          10.1   9.97  )-----------( 291      ( 20 Oct 2023 05:05 )             32.9     10-20    138  |  99  |  8<L>  ----------------------------<  157<H>  3.7   |  32  |  0.6<L>    Ca    8.1<L>      20 Oct 2023 05:05  Mg     2.1     10-20    TPro  6.0  /  Alb  2.9<L>  /  TBili  0.3  /  DBili  x   /  AST  28  /  ALT  18  /  AlkPhos  138<H>  10-20      Urinalysis Basic - ( 20 Oct 2023 05:05 )    Color: x / Appearance: x / SG: x / pH: x  Gluc: 157 mg/dL / Ketone: x  / Bili: x / Urobili: x   Blood: x / Protein: x / Nitrite: x   Leuk Esterase: x / RBC: x / WBC x   Sq Epi: x / Non Sq Epi: x / Bacteria: x    vEEG: Background- continuous, symmetrical, less than optimally organized, reaching frequencies in the range of 5-6 Hz. The recording shows periods of wakefulness.    Focal and generalized slowin. moderate generalized slowing  2. mild bilateral R>L focal slowing    Interictal activity:  1. diffusely expressed triphasic waves  2. right posterior quadrant sharp waves that are probably epileptiform and sharp transients  3. small number of stimulus-dependent posterior periodic dischargees

## 2023-10-20 NOTE — PROGRESS NOTE ADULT - ASSESSMENT
58y old  F with PMHx of PMHx of possible seizures? (not on any AED), Down syndrome, nonverbal at baseline, hypothyroidism, cerebral palsy, congenital pulmonary stenosis who presented for upper body shaking and syncope episode. In the setting of bradycardia, severe hypotension and  lack of urinary/bowel incontinence or tongue bitting makes seizures unlikely. Would be more concern about hypotension leading to low perfusion in the brain which may cause shaking. However cant r/o seizure at this time. Upon chart review, seizure hx is only mentioned in one of paperwork from NH, no seizure hx during last admission or in the o/p notes.     Recommendations:  - Continue video EEG  - Start Keppra 500 mg BID  - Order trough levels of levetiracetam  - Will recommend switching Cefepime to other Abx if possible, as Cefepime can lower seizure threshold  - Ativan 2mg IV for GTCs > 2 min only  - Neurological assessment Q8hrs  - Seizure & Fall precautions  - Maintain Mg> 2 mmol/l  - Gen Neuro team will follow    Pending attending attestation.    Michael Stahl MD  PGY2, Neurology 58y old  F with PMHx of PMHx of possible seizures? (not on any AED), Down syndrome, nonverbal at baseline, hypothyroidism, cerebral palsy, congenital pulmonary stenosis who presented for upper body shaking and syncope episode. In the setting of bradycardia, severe hypotension and  lack of urinary/bowel incontinence or tongue bitting makes seizures unlikely. Would be more concern about hypotension leading to low perfusion in the brain which may cause shaking. However cant r/o seizure at this time. Upon chart review, seizure hx is only mentioned in one of paperwork from NH, no seizure hx during last admission or in the o/p notes.     VEEG showed   diffusely expressed triphasic waves  2. right posterior quadrant sharp waves that are probably epileptiform and sharp transients  3. small number of stimulus-dependent posterior periodic dischargees    Recommendations:  -Stop VEEG   - Start Keppra 500 mg BID  - Order trough levels of levetiracetam  - Will recommend switching Cefepime to other Abx if possible, as Cefepime can lower seizure threshold  - Ativan 2mg IV for GTCs > 2 min only  - Neurological assessment Q8hrs  - Seizure & Fall precautions  - Maintain Mg> 2 mmol/l

## 2023-10-20 NOTE — PROGRESS NOTE ADULT - ASSESSMENT
ASSESSMENT   58 year old female with a PMHx of Down syndrome, nonverbal at baseline, hypothyroidism, cerebral palsy, congenital pulmonary stenosis, Seizures, presents to the ED from nursing facility for syncope associated with tonic-clonic movement witnessed by aide. Initial vitals at nursing home showed bradycardia and hypoxia for about 10-15 minutes. N    IMPRESSION  #Seizure like activity   #Right planter foot ulcers - two full thickness ulcers - serous drainage with mild erythema with OM  - MR Foot No Cont, Right (10.16.23 @ 21:51): IMPRESSION: 1.  Limited exam. 2.  Osteomyelitis of the first metatarsal stump. 3.  Osteomyelitis of the second toe distal phalanx.    #Rapid Response 10/17   #HAP   - Xray Chest 1 View- PORTABLE-Urgent (Xray Chest 1 View- PORTABLE-Urgent .) (10.17.23 @ 06:10): Worsening pneumonia, left lower lung.  - trach Cx 10/17 NG     #Elevated Fungitell   Fungitell: >500 pg/mL (10.17.23 @ 17:20)    #Buttock decubitus ulcer     #Recently Treated Multifocal PNA    #Down syndrome/Crebral Palsy   #Obesity BMI (kg/m2): 23.7, 26.3  #Abx allergy: No Known Allergies    RECOMMENDATIONS  - meropenem 1g q 8 hours (start date 10/17)   - elevated fungitell non-specific -- on caspofungin 50 mg daily   - wean pressors as tolerated    Please call or message on [employer] Teams if with any questions.  Spectra [id number]

## 2023-10-20 NOTE — PROGRESS NOTE ADULT - ASSESSMENT
Patient is a 58 year old female with a PMHx of Down syndrome, nonverbal at baseline, hypothyroidism, cerebral palsy, congenital pulmonary stenosis, seizures, presents to the ED from nursing facility for syncope associated with tonic-clonic movement witnessed by aide, vitals showed hypoxia and bradycardia for about 10-15 minutes, found to have multilobar pneumonia. Pt with recent admission to ICU for UGIB, found to have duodenal perforation, also found to have PNA, admitted to ICU for septic and hypovolemic shock requiring intubation and pressors.     #Acute hypoxemic respiratory failure s/p intubation  #Aspiration pneumonia   #DVT left common fem on full AC   -Pt being treated for multilobar pna this admission. Possible aspiration event on 10/17 AM, RR called for desats, no improvement on nonrebreather>HFNC>anesthesia intubated patient on floor. R IJ placed, patient started on levo and precedex.   -Pt currently on levo 0.14, fentanyl 1.5.   -taper pressors   -midodrine 10 q8  -CXR - Bilateral opacities and left worse than right lower lobe atelectasis.  -Duplex 10/17 shows DVT in Left common fem - pt on Lovenox 50mg bid  -CT angio 10/18 - No CT evidence of pulmonary embolism. Bilateral pneumonia.  -Hb stable  -bowel regimen, pt having BM (on fentanyl drip)  -Fungitell positive >500  -ID following, on meropenem and caspofungin   -SBT today     #H/o recent duodenal perforation  -pt on GI ppx  -Pt on therapeutic lovenox for DVT  -Hb stable     #Osteomyelitis, right foot   -Patient with multiple decubitus ulcers on her right foot, less on left foot.   -Xray right foot showed post transmetatarsal amputation of the first ray with periosteal reaction at the amputation stump, suspicious for recurrent osteomyelitis. There is second toe ulcer with osseous erosion/destruction at the second distal phalangeal tuft, consistent with osteomyelitis. There is dorsal   -MR right foot - 1.  Limited exam. 2.  Osteomyelitis of the first metatarsal stump. 3.  Osteomyelitis of the second toe distal phalanx.  -podiatry following, OR for debridement - was scheduled for Friday 10/20 - cancelled and will reassess for surgery next week.    #H/o seizures, not on AED  -On day of admission pt had tonic-clonic movements witnessed by aide  -neuro consulted - Episode suspicious for cardiac event leading to drop in BP and HR. Unclear if epileptic seizure.  -EEG showed generalized slowing, no seizure activity   -OVN 10/17 RN noted another episode, lasted around 30sec with whole body rhythmic shaking, left arm jerking, abnormal eye movements. 2mg ativan given.   -neuro following  -f/u vEEG     #HO polymicrobial bacteremia   #H/o CP    Misc:  Diet - npo, tube feeds   Activity - bedrest  DVT ppx - therapeutic lovenox     Lines - R IJ, dc barlow today   Drips - fentanyl 1.5, levo 0.14 Patient is a 58 year old female with a PMHx of Down syndrome, nonverbal at baseline, hypothyroidism, cerebral palsy, congenital pulmonary stenosis, seizures, presents to the ED from nursing facility for syncope associated with tonic-clonic movement witnessed by aide, vitals showed hypoxia and bradycardia for about 10-15 minutes, found to have multilobar pneumonia. Pt with recent admission to ICU for UGIB, found to have duodenal perforation, also found to have PNA, admitted to ICU for septic and hypovolemic shock requiring intubation and pressors.     #Acute hypoxemic respiratory failure s/p intubation  #Aspiration pneumonia   #DVT left common fem on full AC   -Pt being treated for multilobar pna this admission. Possible aspiration event on 10/17 AM, RR called for desats, no improvement on nonrebreather>HFNC>anesthesia intubated patient on floor. R IJ placed, patient started on levo and precedex.   -Pt currently on levo 0.14, fentanyl 1.5.   -taper pressors   -midodrine 10 q8  -CXR - Bilateral opacities and left worse than right lower lobe atelectasis.  -Duplex 10/17 shows DVT in Left common fem - pt on Lovenox 50mg bid  -CT angio 10/18 - No CT evidence of pulmonary embolism. Bilateral pneumonia.  -Hb stable  -bowel regimen, pt having BM (on fentanyl drip)  -Fungitell positive >500  -ID following, on meropenem and caspofungin   -SBT today     #H/o recent duodenal perforation  -pt on GI ppx  -Pt on therapeutic lovenox for DVT  -Hb stable     #Osteomyelitis, right foot   -Patient with multiple decubitus ulcers on her right foot, less on left foot.   -Xray right foot showed post transmetatarsal amputation of the first ray with periosteal reaction at the amputation stump, suspicious for recurrent osteomyelitis. There is second toe ulcer with osseous erosion/destruction at the second distal phalangeal tuft, consistent with osteomyelitis. There is dorsal   -MR right foot - 1.  Limited exam. 2.  Osteomyelitis of the first metatarsal stump. 3.  Osteomyelitis of the second toe distal phalanx.  -podiatry following, OR for debridement - was scheduled for Friday 10/20 - cancelled and will reassess for surgery next week.    #H/o seizures, not on AED  -On day of admission pt had tonic-clonic movements witnessed by aide  -neuro consulted - Episode suspicious for cardiac event leading to drop in BP and HR. Unclear if epileptic seizure.  -EEG showed generalized slowing, no seizure activity   -OVN 10/17 RN noted another episode, lasted around 30sec with whole body rhythmic shaking, left arm jerking, abnormal eye movements. 2mg ativan given.   -neuro following  -f/u vEEG     #HO polymicrobial bacteremia   #H/o CP    Misc:  Diet - npo, tube feeds   Activity - bedrest  DVT ppx - therapeutic lovenox     Lines - R IJ 10/17, dc barlow today   Drips - fentanyl 1.5, levo 0.14

## 2023-10-20 NOTE — EEG REPORT - NS EEG TEXT BOX
Epilepsy Attending Note:     TEA ECHAVARRIA    58y Female  MRN [id number]    Vital Signs Last 24 Hrs  T(C): 36.9 (20 Oct 2023 08:00), Max: 37.8 (19 Oct 2023 20:00)  T(F): 98.4 (20 Oct 2023 08:00), Max: 100 (19 Oct 2023 20:00)  HR: 52 (20 Oct 2023 08:00) (50 - 83)  BP: 118/56 (20 Oct 2023 08:00) (84/47 - 136/63)  BP(mean): 80 (20 Oct 2023 08:00) (62 - 93)  RR: 14 (20 Oct 2023 08:00) (14 - 24)  SpO2: 96% (20 Oct 2023 08:00) (92% - 99%)    Parameters below as of 20 Oct 2023 07:00  Patient On (Oxygen Delivery Method): ventilator    O2 Concentration (%): 45                          10.1   9.97  )-----------( 291      ( 20 Oct 2023 05:05 )             32.9       10-20    138  |  99  |  8<L>  ----------------------------<  157<H>  3.7   |  32  |  0.6<L>    Ca    8.1<L>      20 Oct 2023 05:05  Mg     2.1     10-20    TPro  6.0  /  Alb  2.9<L>  /  TBili  0.3  /  DBili  x   /  AST  28  /  ALT  18  /  AlkPhos  138<H>  10-20      MEDICATIONS  (STANDING):  caspofungin IVPB      chlorhexidine 0.12% Liquid 15 milliLiter(s) Oral Mucosa every 12 hours  chlorhexidine 2% Cloths 1 Application(s) Topical <User Schedule>  dexMEDEtomidine Infusion 0.5 MICROgram(s)/kG/Hr (6.24 mL/Hr) IV Continuous <Continuous>  enoxaparin Injectable 50 milliGRAM(s) SubCutaneous every 12 hours  fentaNYL   Infusion. 0.5 MICROgram(s)/kG/Hr (2.5 mL/Hr) IV Continuous <Continuous>  gabapentin 600 milliGRAM(s) Oral daily  hydrocortisone sodium succinate Injectable 50 milliGRAM(s) IV Push every 6 hours  lactulose Syrup 20 Gram(s) Oral every 6 hours  meropenem  IVPB 1000 milliGRAM(s) IV Intermittent every 8 hours  midodrine 10 milliGRAM(s) Oral every 8 hours  multivitamin 1 Tablet(s) Oral daily  norepinephrine Infusion 0.05 MICROgram(s)/kG/Min (4.68 mL/Hr) IV Continuous <Continuous>  pantoprazole   Suspension 40 milliGRAM(s) Oral two times a day  polyethylene glycol 3350 17 Gram(s) Oral daily  raloxifene 60 milliGRAM(s) Oral daily    MEDICATIONS  (PRN):  acetaminophen     Tablet .. 650 milliGRAM(s) Oral every 6 hours PRN Temp greater or equal to 38C (100.4F), Mild Pain (1 - 3)            VEEG in the last 24 hours:    Background- continuous, symmetrical, less than optimally organized, reaching frequencies in the range of 5-6 Hz. The recording shows periods of wakefulness.    Focal and generalized slowin. moderate generalized slowing  2. mild bilateral R>L focal slowing    Interictal activity:  1. diffusely expressed triphasic waves  2. right posterior quadrant sharp waves that are probably epileptiform and sharp transients  3. small number of stimulus-dependent posterior periodic dischargees    Events - none    Seizures - none    Impression: Abnormal VEEG as above    Plan - per neurology team    
[facility] Department of Neurology  Inpatient Routine-EEG Report      Patient Name:	TEA ECHAVARRIA    :	1965  MRN:	-  Study Date/Time:	10/17/2023, 7:42:42 PM  Referred by:	-    Brief Clinical History:  TEA ECHAVARRIA is a 58 year old Female; study performed to investigate for seizures or markers of epilepsy.   Diagnosis Code: R56.9 convulsions/seizure    Patient Medication:  PERIDEX    LEVOPHED    NEURONTIN    PROTONIX    -      Acquisition Details:  Electroencephalography was acquired using a minimum of 21 channels on an [employer] system v 9.3.1 with electrode placement according to the standard International 10-20 system following ACNS (American Clinical Neurophysiology Society) guidelines.  Anterior temporal T1 and T2 electrodes were utilized whenever possible.   The [employer] automated spike & seizure detections were all reviewed in detail, in addition to the entire raw EEG.    Findings:  Background:  continuous.   Voltage:  Normal (20uV)  Organization:  Rudimentary  Posterior Dominant Rhythm:  <7 Hz symmetric, well-organized, and well-modulated  Variability:  Yes	Reactivity:  Yes  Sleep:  Absent.  Focal abnormalities:  No persistent asymmetries of voltage or frequency.  Interictal Activity:  None  Focal Slowing:  None  Generalized Slowing:  Moderate  Events:  1)	No electrographic seizures or significant clinical events.  Provocations:  1)	Hyperventilation: was not performed.  2)	Photic stimulation: was not performed.  Impression:  Abnormal due to generalized slowing as above    Clinical Correlation:  Findings consistent with diffuse electrocerebral dysfunction secondary to nonspecific etiology    Obi Herr MD  Attending Neurologist, Division of Epilepsy  
[place] Department of Neurology  Inpatient Routine-EEG Report      Patient Name:	TEA ECHAVARRIA    :	1965  MRN:	-  Study Date/Time:	2023-10-15, 8:02:27 AM  Referred by:	-    Brief Clinical History:  TEA ECHAVARRIA is a 58 year old Female; study performed to investigate for seizures or markers of epilepsy.   Diagnosis Code: R40.4 Transient alteration of awareness    Patient Medication:  -    PROTONIC    MELATONIN    HEPARIN    NEURONTIN    MAXIPIME    VANCOMYCINI      Acquisition Details:  Electroencephalography was acquired using a minimum of 21 channels on an [employer] system v 9.3.1 with electrode placement according to the standard International 10-20 system following ACNS (American Clinical Neurophysiology Society) guidelines.  Anterior temporal T1 and T2 electrodes were utilized whenever possible.   The [employer] automated spike & seizure detections were all reviewed in detail, in addition to the entire raw EEG.    Findings:  Background:  continuous.   Voltage:  Normal (20uV)  Organization:  Appropriate anterior-posterior gradient  Posterior Dominant Rhythm:  <7 Hz Symmetric  Variability:  Yes	Reactivity:  Yes  Sleep:  Absent.  Focal abnormalities:  No persistent asymmetries of voltage or frequency.  Interictal Activity:  None  Focal Slowing:  None  Generalized Slowing:  Moderate  Events:  1)	No electrographic seizures or significant clinical events.  Provocations:  1)	Hyperventilation: was not performed.  2)	Photic stimulation: was not performed.  Impression:  Abnormal due to generalized slowing as above    Clinical Correlation:  Findings consistent with diffuse electrocerebral dysfunction secondary to nonspecific etiology    Obi Herr MD  Attending Neurologist, Division of Epilepsy

## 2023-10-21 LAB
ALBUMIN SERPL ELPH-MCNC: 2.7 G/DL — LOW (ref 3.5–5.2)
ALBUMIN SERPL ELPH-MCNC: 2.7 G/DL — LOW (ref 3.5–5.2)
ALP SERPL-CCNC: 123 U/L — HIGH (ref 30–115)
ALP SERPL-CCNC: 123 U/L — HIGH (ref 30–115)
ALT FLD-CCNC: 14 U/L — SIGNIFICANT CHANGE UP (ref 0–41)
ALT FLD-CCNC: 14 U/L — SIGNIFICANT CHANGE UP (ref 0–41)
ANION GAP SERPL CALC-SCNC: 8 MMOL/L — SIGNIFICANT CHANGE UP (ref 7–14)
ANION GAP SERPL CALC-SCNC: 8 MMOL/L — SIGNIFICANT CHANGE UP (ref 7–14)
AST SERPL-CCNC: 15 U/L — SIGNIFICANT CHANGE UP (ref 0–41)
AST SERPL-CCNC: 15 U/L — SIGNIFICANT CHANGE UP (ref 0–41)
BASE EXCESS BLDA CALC-SCNC: 13 MMOL/L — HIGH (ref -2–3)
BASE EXCESS BLDA CALC-SCNC: 13 MMOL/L — HIGH (ref -2–3)
BASE EXCESS BLDA CALC-SCNC: 14.6 MMOL/L — HIGH (ref -2–3)
BASE EXCESS BLDA CALC-SCNC: 14.6 MMOL/L — HIGH (ref -2–3)
BASOPHILS # BLD AUTO: 0.02 K/UL — SIGNIFICANT CHANGE UP (ref 0–0.2)
BASOPHILS # BLD AUTO: 0.02 K/UL — SIGNIFICANT CHANGE UP (ref 0–0.2)
BASOPHILS NFR BLD AUTO: 0.2 % — SIGNIFICANT CHANGE UP (ref 0–1)
BASOPHILS NFR BLD AUTO: 0.2 % — SIGNIFICANT CHANGE UP (ref 0–1)
BILIRUB SERPL-MCNC: <0.2 MG/DL — SIGNIFICANT CHANGE UP (ref 0.2–1.2)
BILIRUB SERPL-MCNC: <0.2 MG/DL — SIGNIFICANT CHANGE UP (ref 0.2–1.2)
BUN SERPL-MCNC: 10 MG/DL — SIGNIFICANT CHANGE UP (ref 10–20)
BUN SERPL-MCNC: 10 MG/DL — SIGNIFICANT CHANGE UP (ref 10–20)
CALCIUM SERPL-MCNC: 8.5 MG/DL — SIGNIFICANT CHANGE UP (ref 8.4–10.4)
CALCIUM SERPL-MCNC: 8.5 MG/DL — SIGNIFICANT CHANGE UP (ref 8.4–10.4)
CHLORIDE SERPL-SCNC: 98 MMOL/L — SIGNIFICANT CHANGE UP (ref 98–110)
CHLORIDE SERPL-SCNC: 98 MMOL/L — SIGNIFICANT CHANGE UP (ref 98–110)
CO2 SERPL-SCNC: 33 MMOL/L — HIGH (ref 17–32)
CO2 SERPL-SCNC: 33 MMOL/L — HIGH (ref 17–32)
CREAT SERPL-MCNC: 0.5 MG/DL — LOW (ref 0.7–1.5)
CREAT SERPL-MCNC: 0.5 MG/DL — LOW (ref 0.7–1.5)
EGFR: 109 ML/MIN/1.73M2 — SIGNIFICANT CHANGE UP
EGFR: 109 ML/MIN/1.73M2 — SIGNIFICANT CHANGE UP
EOSINOPHIL # BLD AUTO: 0 K/UL — SIGNIFICANT CHANGE UP (ref 0–0.7)
EOSINOPHIL # BLD AUTO: 0 K/UL — SIGNIFICANT CHANGE UP (ref 0–0.7)
EOSINOPHIL NFR BLD AUTO: 0 % — SIGNIFICANT CHANGE UP (ref 0–8)
EOSINOPHIL NFR BLD AUTO: 0 % — SIGNIFICANT CHANGE UP (ref 0–8)
GAS PNL BLDA: SIGNIFICANT CHANGE UP
GLUCOSE SERPL-MCNC: 221 MG/DL — HIGH (ref 70–99)
GLUCOSE SERPL-MCNC: 221 MG/DL — HIGH (ref 70–99)
HCO3 BLDA-SCNC: 38 MMOL/L — HIGH (ref 21–28)
HCO3 BLDA-SCNC: 38 MMOL/L — HIGH (ref 21–28)
HCO3 BLDA-SCNC: 39 MMOL/L — HIGH (ref 21–28)
HCO3 BLDA-SCNC: 39 MMOL/L — HIGH (ref 21–28)
HCT VFR BLD CALC: 34.4 % — LOW (ref 37–47)
HCT VFR BLD CALC: 34.4 % — LOW (ref 37–47)
HGB BLD-MCNC: 10.7 G/DL — LOW (ref 12–16)
HGB BLD-MCNC: 10.7 G/DL — LOW (ref 12–16)
HOROWITZ INDEX BLDA+IHG-RTO: 40 — SIGNIFICANT CHANGE UP
HOROWITZ INDEX BLDA+IHG-RTO: 40 — SIGNIFICANT CHANGE UP
IMM GRANULOCYTES NFR BLD AUTO: 1.1 % — HIGH (ref 0.1–0.3)
IMM GRANULOCYTES NFR BLD AUTO: 1.1 % — HIGH (ref 0.1–0.3)
LYMPHOCYTES # BLD AUTO: 0.68 K/UL — LOW (ref 1.2–3.4)
LYMPHOCYTES # BLD AUTO: 0.68 K/UL — LOW (ref 1.2–3.4)
LYMPHOCYTES # BLD AUTO: 6.9 % — LOW (ref 20.5–51.1)
LYMPHOCYTES # BLD AUTO: 6.9 % — LOW (ref 20.5–51.1)
MAGNESIUM SERPL-MCNC: 2 MG/DL — SIGNIFICANT CHANGE UP (ref 1.8–2.4)
MAGNESIUM SERPL-MCNC: 2 MG/DL — SIGNIFICANT CHANGE UP (ref 1.8–2.4)
MCHC RBC-ENTMCNC: 23.7 PG — LOW (ref 27–31)
MCHC RBC-ENTMCNC: 23.7 PG — LOW (ref 27–31)
MCHC RBC-ENTMCNC: 31.1 G/DL — LOW (ref 32–37)
MCHC RBC-ENTMCNC: 31.1 G/DL — LOW (ref 32–37)
MCV RBC AUTO: 76.1 FL — LOW (ref 81–99)
MCV RBC AUTO: 76.1 FL — LOW (ref 81–99)
MONOCYTES # BLD AUTO: 0.42 K/UL — SIGNIFICANT CHANGE UP (ref 0.1–0.6)
MONOCYTES # BLD AUTO: 0.42 K/UL — SIGNIFICANT CHANGE UP (ref 0.1–0.6)
MONOCYTES NFR BLD AUTO: 4.2 % — SIGNIFICANT CHANGE UP (ref 1.7–9.3)
MONOCYTES NFR BLD AUTO: 4.2 % — SIGNIFICANT CHANGE UP (ref 1.7–9.3)
NEUTROPHILS # BLD AUTO: 8.67 K/UL — HIGH (ref 1.4–6.5)
NEUTROPHILS # BLD AUTO: 8.67 K/UL — HIGH (ref 1.4–6.5)
NEUTROPHILS NFR BLD AUTO: 87.6 % — HIGH (ref 42.2–75.2)
NEUTROPHILS NFR BLD AUTO: 87.6 % — HIGH (ref 42.2–75.2)
NRBC # BLD: 0 /100 WBCS — SIGNIFICANT CHANGE UP (ref 0–0)
NRBC # BLD: 0 /100 WBCS — SIGNIFICANT CHANGE UP (ref 0–0)
PCO2 BLDA: 43 MMHG — SIGNIFICANT CHANGE UP (ref 25–48)
PCO2 BLDA: 43 MMHG — SIGNIFICANT CHANGE UP (ref 25–48)
PCO2 BLDA: 53 MMHG — HIGH (ref 25–48)
PCO2 BLDA: 53 MMHG — HIGH (ref 25–48)
PH BLDA: 7.47 — HIGH (ref 7.35–7.45)
PH BLDA: 7.47 — HIGH (ref 7.35–7.45)
PH BLDA: 7.56 — HIGH (ref 7.35–7.45)
PH BLDA: 7.56 — HIGH (ref 7.35–7.45)
PLATELET # BLD AUTO: 283 K/UL — SIGNIFICANT CHANGE UP (ref 130–400)
PLATELET # BLD AUTO: 283 K/UL — SIGNIFICANT CHANGE UP (ref 130–400)
PMV BLD: 9.9 FL — SIGNIFICANT CHANGE UP (ref 7.4–10.4)
PMV BLD: 9.9 FL — SIGNIFICANT CHANGE UP (ref 7.4–10.4)
PO2 BLDA: 122 MMHG — HIGH (ref 83–108)
PO2 BLDA: 122 MMHG — HIGH (ref 83–108)
PO2 BLDA: 88 MMHG — SIGNIFICANT CHANGE UP (ref 83–108)
PO2 BLDA: 88 MMHG — SIGNIFICANT CHANGE UP (ref 83–108)
POTASSIUM SERPL-MCNC: 3.6 MMOL/L — SIGNIFICANT CHANGE UP (ref 3.5–5)
POTASSIUM SERPL-MCNC: 3.6 MMOL/L — SIGNIFICANT CHANGE UP (ref 3.5–5)
POTASSIUM SERPL-SCNC: 3.6 MMOL/L — SIGNIFICANT CHANGE UP (ref 3.5–5)
POTASSIUM SERPL-SCNC: 3.6 MMOL/L — SIGNIFICANT CHANGE UP (ref 3.5–5)
PROT SERPL-MCNC: 6.1 G/DL — SIGNIFICANT CHANGE UP (ref 6–8)
PROT SERPL-MCNC: 6.1 G/DL — SIGNIFICANT CHANGE UP (ref 6–8)
RBC # BLD: 4.52 M/UL — SIGNIFICANT CHANGE UP (ref 4.2–5.4)
RBC # BLD: 4.52 M/UL — SIGNIFICANT CHANGE UP (ref 4.2–5.4)
RBC # FLD: 25.5 % — HIGH (ref 11.5–14.5)
RBC # FLD: 25.5 % — HIGH (ref 11.5–14.5)
SAO2 % BLDA: 98 % — SIGNIFICANT CHANGE UP (ref 94–98)
SAO2 % BLDA: 98 % — SIGNIFICANT CHANGE UP (ref 94–98)
SAO2 % BLDA: 99.8 % — HIGH (ref 94–98)
SAO2 % BLDA: 99.8 % — HIGH (ref 94–98)
SODIUM SERPL-SCNC: 139 MMOL/L — SIGNIFICANT CHANGE UP (ref 135–146)
SODIUM SERPL-SCNC: 139 MMOL/L — SIGNIFICANT CHANGE UP (ref 135–146)
WBC # BLD: 9.9 K/UL — SIGNIFICANT CHANGE UP (ref 4.8–10.8)
WBC # BLD: 9.9 K/UL — SIGNIFICANT CHANGE UP (ref 4.8–10.8)
WBC # FLD AUTO: 9.9 K/UL — SIGNIFICANT CHANGE UP (ref 4.8–10.8)
WBC # FLD AUTO: 9.9 K/UL — SIGNIFICANT CHANGE UP (ref 4.8–10.8)

## 2023-10-21 PROCEDURE — 99291 CRITICAL CARE FIRST HOUR: CPT

## 2023-10-21 PROCEDURE — 71045 X-RAY EXAM CHEST 1 VIEW: CPT | Mod: 26,77

## 2023-10-21 PROCEDURE — 71045 X-RAY EXAM CHEST 1 VIEW: CPT | Mod: 26

## 2023-10-21 RX ORDER — ENOXAPARIN SODIUM 100 MG/ML
50 INJECTION SUBCUTANEOUS EVERY 12 HOURS
Refills: 0 | Status: DISCONTINUED | OUTPATIENT
Start: 2023-10-21 | End: 2023-10-30

## 2023-10-21 RX ORDER — HYDROCORTISONE 20 MG
50 TABLET ORAL EVERY 8 HOURS
Refills: 0 | Status: DISCONTINUED | OUTPATIENT
Start: 2023-10-21 | End: 2023-10-22

## 2023-10-21 RX ORDER — FENTANYL CITRATE 50 UG/ML
0.41 INJECTION INTRAVENOUS
Qty: 2500 | Refills: 0 | Status: DISCONTINUED | OUTPATIENT
Start: 2023-10-21 | End: 2023-10-21

## 2023-10-21 RX ORDER — ENOXAPARIN SODIUM 100 MG/ML
60 INJECTION SUBCUTANEOUS EVERY 12 HOURS
Refills: 0 | Status: DISCONTINUED | OUTPATIENT
Start: 2023-10-21 | End: 2023-10-21

## 2023-10-21 RX ORDER — NOREPINEPHRINE BITARTRATE/D5W 8 MG/250ML
0.05 PLASTIC BAG, INJECTION (ML) INTRAVENOUS
Qty: 8 | Refills: 0 | Status: DISCONTINUED | OUTPATIENT
Start: 2023-10-21 | End: 2023-10-23

## 2023-10-21 RX ORDER — POTASSIUM CHLORIDE 20 MEQ
20 PACKET (EA) ORAL
Refills: 0 | Status: COMPLETED | OUTPATIENT
Start: 2023-10-21 | End: 2023-10-21

## 2023-10-21 RX ADMIN — Medication 5.68 MICROGRAM(S)/KG/MIN: at 21:41

## 2023-10-21 RX ADMIN — MEROPENEM 100 MILLIGRAM(S): 1 INJECTION INTRAVENOUS at 01:33

## 2023-10-21 RX ADMIN — ENOXAPARIN SODIUM 50 MILLIGRAM(S): 100 INJECTION SUBCUTANEOUS at 17:35

## 2023-10-21 RX ADMIN — MEROPENEM 100 MILLIGRAM(S): 1 INJECTION INTRAVENOUS at 09:30

## 2023-10-21 RX ADMIN — Medication 50 MILLIEQUIVALENT(S): at 07:49

## 2023-10-21 RX ADMIN — MEROPENEM 100 MILLIGRAM(S): 1 INJECTION INTRAVENOUS at 17:36

## 2023-10-21 RX ADMIN — ENOXAPARIN SODIUM 50 MILLIGRAM(S): 100 INJECTION SUBCUTANEOUS at 05:28

## 2023-10-21 RX ADMIN — Medication 5.68 MICROGRAM(S)/KG/MIN: at 17:34

## 2023-10-21 RX ADMIN — PANTOPRAZOLE SODIUM 40 MILLIGRAM(S): 20 TABLET, DELAYED RELEASE ORAL at 18:38

## 2023-10-21 RX ADMIN — MIDODRINE HYDROCHLORIDE 10 MILLIGRAM(S): 2.5 TABLET ORAL at 05:43

## 2023-10-21 RX ADMIN — CASPOFUNGIN ACETATE 260 MILLIGRAM(S): 7 INJECTION, POWDER, LYOPHILIZED, FOR SOLUTION INTRAVENOUS at 07:49

## 2023-10-21 RX ADMIN — Medication 50 MILLIEQUIVALENT(S): at 09:37

## 2023-10-21 RX ADMIN — Medication 50 MILLIGRAM(S): at 13:28

## 2023-10-21 RX ADMIN — LEVETIRACETAM 500 MILLIGRAM(S): 250 TABLET, FILM COATED ORAL at 18:38

## 2023-10-21 RX ADMIN — Medication 50 MILLIGRAM(S): at 21:41

## 2023-10-21 RX ADMIN — FENTANYL CITRATE 2.5 MICROGRAM(S)/KG/HR: 50 INJECTION INTRAVENOUS at 01:51

## 2023-10-21 RX ADMIN — CHLORHEXIDINE GLUCONATE 1 APPLICATION(S): 213 SOLUTION TOPICAL at 05:45

## 2023-10-21 RX ADMIN — LEVETIRACETAM 500 MILLIGRAM(S): 250 TABLET, FILM COATED ORAL at 05:28

## 2023-10-21 RX ADMIN — Medication 50 MILLIGRAM(S): at 00:55

## 2023-10-21 RX ADMIN — PANTOPRAZOLE SODIUM 40 MILLIGRAM(S): 20 TABLET, DELAYED RELEASE ORAL at 05:44

## 2023-10-21 RX ADMIN — CHLORHEXIDINE GLUCONATE 15 MILLILITER(S): 213 SOLUTION TOPICAL at 05:25

## 2023-10-21 RX ADMIN — Medication 50 MILLIGRAM(S): at 05:43

## 2023-10-21 RX ADMIN — MIDODRINE HYDROCHLORIDE 10 MILLIGRAM(S): 2.5 TABLET ORAL at 21:41

## 2023-10-21 NOTE — SWALLOW BEDSIDE ASSESSMENT ADULT - SLP PERTINENT HISTORY OF CURRENT PROBLEM
Pt w/ hx significant for Down syndrome, nonverbal at baseline, cerebral palsy, seizures, neuropathy, congenital pulmonary stenosis presented for upper body shaking and syncope episode w/ bradycardia and severe hypotension.  Per neurology note: "VEEG showed right posterior quadrant sharp waves that are probably epileptiform and sharp transients."  Pt is s/p rapid response on 10/17 w/ subsequent intubation 2' AHRF.  CXR 10/17: "Worsening pneumonia, left lower lung."  Pt was extubated this morning.

## 2023-10-21 NOTE — PROGRESS NOTE ADULT - SUBJECTIVE AND OBJECTIVE BOX
24H events:    Patient is a 58y old Female who presents with a chief complaint of Pre-syncope (21 Oct 2023 09:55)    Primary diagnosis of Pre-syncope      Day 1:  Day 2:  Day 3:     Today is hospital day 7d. This morning patient was seen and examined at bedside, resting comfortably in bed.    No acute or major events overnight.    Code Status:    Family communication:  Contact date:  Name of person contacted:  Relationship to patient:  Communication details:  What matters most:    PAST MEDICAL & SURGICAL HISTORY  Down syndrome    Osteoporosis    Mild anemia    Neuropathy    S/P debridement  of R hip on 3/2/21      SOCIAL HISTORY:  Social History:  Lives at nursing home (14 Oct 2023 20:33)      ALLERGIES:  No Known Allergies    MEDICATIONS:  STANDING MEDICATIONS  caspofungin IVPB 50 milliGRAM(s) IV Intermittent every 24 hours  caspofungin IVPB      chlorhexidine 2% Cloths 1 Application(s) Topical <User Schedule>  dexMEDEtomidine Infusion 0.5 MICROgram(s)/kG/Hr IV Continuous <Continuous>  enoxaparin Injectable 50 milliGRAM(s) SubCutaneous every 12 hours  fentaNYL   Infusion. 0.413 MICROgram(s)/kG/Hr IV Continuous <Continuous>  gabapentin 600 milliGRAM(s) Oral daily  hydrocortisone sodium succinate Injectable 50 milliGRAM(s) IV Push every 8 hours  lactulose Syrup 20 Gram(s) Oral every 6 hours  levETIRAcetam 500 milliGRAM(s) Oral two times a day  meropenem  IVPB 1000 milliGRAM(s) IV Intermittent every 8 hours  midodrine 10 milliGRAM(s) Oral every 8 hours  multivitamin 1 Tablet(s) Oral daily  norepinephrine Infusion 0.05 MICROgram(s)/kG/Min IV Continuous <Continuous>  pantoprazole   Suspension 40 milliGRAM(s) Oral two times a day  polyethylene glycol 3350 17 Gram(s) Oral daily  raloxifene 60 milliGRAM(s) Oral daily    PRN MEDICATIONS  acetaminophen     Tablet .. 650 milliGRAM(s) Oral every 6 hours PRN    VITALS:   T(F): 97.2  HR: 54  BP: 84/47  RR: 15  SpO2: 90%      PHYSICAL EXAM    ICU Vital Signs Last 24 Hrs  T(C): 36.2 (21 Oct 2023 08:00), Max: 37.2 (20 Oct 2023 16:00)  T(F): 97.2 (21 Oct 2023 08:00), Max: 99 (20 Oct 2023 16:00)  HR: 49 (21 Oct 2023 09:30) (43 - 127)  BP: 107/58 (21 Oct 2023 09:30) (86/47 - 155/70)  BP(mean): 79 (21 Oct 2023 09:30) (62 - 104)  ABP: --  ABP(mean): --  RR: 14 (21 Oct 2023 09:30) (13 - 37)  SpO2: 98% (21 Oct 2023 09:30) (94% - 100%)    O2 Parameters below as of 21 Oct 2023 08:00  Patient On (Oxygen Delivery Method): ventilator    O2 Concentration (%): 40        CONSTITUTIONAL:  Well nourished.  In NAD    ENT:   Airway patent,   Mouth with normal mucosa.   ET     EYES:   Pupils equal,   Round and reactive to light.    CARDIAC:   Normal rate,   Regular rhythm.        RESPIRATORY:   No wheezing  Bilateral BS  Normal chest expansion  Not tachypneic,  No use of accessory muscles    GASTROINTESTINAL:  Abdomen soft,   Non-tender,   No guarding,   + BS    MUSCULOSKELETAL:   Range of motion is  limited,  No clubbing, cyanosis    NEUROLOGICAL:   Alert  No motor  deficits.    SKIN:   Skin normal color for race,   No evidence of rash.        10-20-23 @ 07:01  -  10-21-23 @ 07:00  --------------------------------------------------------  IN:    Enteral Tube Flush: 200 mL    FentaNYL: 18.2 mL    FentaNYL: 30.5 mL    IV PiggyBack: 50 mL    Miscellaneous Tube Feedin mL    Norepinephrine: 227.5 mL  Total IN: 1246.2 mL    OUT:    Indwelling Catheter - Urethral (mL): 75 mL    Intermittent Catheterization - Urethral (mL): 1300 mL  Total OUT: 1375 mL    Total NET: -128.8 mL      10-21-23 @ 07:01  -  10-21-23 @ 09:57  --------------------------------------------------------  IN:    FentaNYL: 9.1 mL    IV PiggyBack: 400 mL    Norepinephrine: 6.8 mL  Total IN: 415.9 mL    OUT:  Total OUT: 0 mL    Total NET: 415.9 mL      LABS:                        10.7   9.90  )-----------( 283      ( 21 Oct 2023 04:30 )             34.4     10    139  |  98  |  10  ----------------------------<  221<H>  3.6   |  33<H>  |  0.5<L>    Ca    8.5      21 Oct 2023 04:30  Mg     2.0     10    TPro  6.1  /  Alb  2.7<L>  /  TBili  <0.2  /  DBili  x   /  AST  15  /  ALT  14  /  AlkPhos  123<H>  10-21      Urinalysis Basic - ( 21 Oct 2023 04:30 )    Color: x / Appearance: x / SG: x / pH: x  Gluc: 221 mg/dL / Ketone: x  / Bili: x / Urobili: x   Blood: x / Protein: x / Nitrite: x   Leuk Esterase: x / RBC: x / WBC x   Sq Epi: x / Non Sq Epi: x / Bacteria: x      ABG - ( 21 Oct 2023 10:23 )  pH, Arterial: 7.47  pH, Blood: x     /  pCO2: 53    /  pO2: 88    / HCO3: 39    / Base Excess: 13.0  /  SaO2: 98.0                      RADIOLOGY:

## 2023-10-21 NOTE — PROGRESS NOTE ADULT - ASSESSMENT
Assessment and Plan:   · Assessment    IMPRESSION:    Acute hypoxemic respiratory failure  Aspiration pneumonia  DVT   Elevated Fungitell   HO recent duodenal perforation   foot OM  HO polymicrobial bacteremia   H/O CP  H/o seizures    PLAN:    CNS: SAT.      HEENT: Oral care.  ET care     PULMONARY: HOB at 45 degrees.  Aspiration precaution.  SaO2 92 TO 96%. Monitor PPL and DP.  CT angio noted.  SBT    CARDIOVASCULAR: Goal directed fluid resuscitation.  Wean levophed.      GI: Protonix BID.  Hold OG feeding for SBT.  Bowel regimen     RENAL:    FU lytes.  Correct as needed.      INFECTIOUS DISEASE:  ABX PER ID, Continue caspo, FUNGITELL NOTED    HEMATOLOGICAL: DVT therapy.  Monitor CBC. Lovenox therapeutic    ENDOCRINE:  Follow up FS.  Insulin protocol if needed. HC q8    MUSCULOSKELETAL: Bedrest.  Off loading.  Wound care.      Prognosis guarded.      [facility] 10/17

## 2023-10-21 NOTE — PHARMACOTHERAPY INTERVENTION NOTE - INTERVENTION TYPE RECOOMEND
Therapy Discontinuation Recommended - No indication
Therapy Recommended - Additional therapy
Therapy Recommended - Alternative treatment

## 2023-10-21 NOTE — PHARMACOTHERAPY INTERVENTION NOTE - COMMENTS
As per policy, discontinued vancomycin trough order since vancomycin order was discontinued.    Alicia Abdi, PharmD   Clinical Pharmacy Specialist, Infectious Diseases  Tele-Antimicrobial Stewardship Program (Tele-ASP)  Tele-ASP Phone: [phone]  
Recommended to de-escalate from cefepime to cefazolin 1g q8h for empiric treatment for PNA and foot ulcer as per ID. Patient previously on vancomycin and cefepime narrow to cefazolin. Sputum cx 10/4 neg. eGFR 86.     Alicia Abdi, PharmD   Clinical Pharmacy Specialist, Infectious Diseases  Tele-Antimicrobial Stewardship Program (Tele-ASP)  Tele-ASP Phone: [phone]  
pt intubated, sedated, meds via og, recommended  -changing pantoprazole tab to suspension  -holding melatonin  -holding guaifenesin ER  
pt on fentanyl drip, recommended adding bowel regimen w/ senna & Miralax
Recommended de-escalating from vancomycin to cefazolin 1 g q8h for empiric PNA and foot ulcers treatment as per ID. MRSA nares negative. eGFR 86.     Alicia Abdi, PharmD   Clinical Pharmacy Specialist, Infectious Diseases  Tele-Antimicrobial Stewardship Program (Tele-ASP)  Tele-ASP Phone: [phone]   
pt extubated-recommended d/c fentanyl & Precedex

## 2023-10-21 NOTE — PROGRESS NOTE ADULT - ASSESSMENT
IMPRESSION:    Acute hypoxemic respiratory failure  Aspiration pneumonia  DVT   Elevated Fungitell   HO recent duodenal perforation   foot OM  HO polymicrobial bacteremia   H/O CP  H/o seizures    PLAN:    CNS: SAT.      HEENT: Oral care.  ET care     PULMONARY: HOB at 45 degrees.  Aspiration precaution.  SaO2 92 TO 96%. Monitor PPL and DP.  CT angio noted.  SBT    CARDIOVASCULAR: Goal directed fluid resuscitation.  Wean levophed.      GI: Protonix BID.  Hold OG feeding for SBT.  Bowel regimen     RENAL:    FU lytes.  Correct as needed.      INFECTIOUS DISEASE:  ABX PER ID, Continue caspo, FUNGITELL NOTED    HEMATOLOGICAL: DVT therapy.  Monitor CBC. Lovenox therapeutic    ENDOCRINE:  Follow up FS.  Insulin protocol if needed. HC q8    MUSCULOSKELETAL: Bedrest.  Off loading.  Wound care.      Prognosis guarded.      [facility]3] 10/17

## 2023-10-22 LAB
ALBUMIN SERPL ELPH-MCNC: 2.6 G/DL — LOW (ref 3.5–5.2)
ALBUMIN SERPL ELPH-MCNC: 2.6 G/DL — LOW (ref 3.5–5.2)
ALP SERPL-CCNC: 102 U/L — SIGNIFICANT CHANGE UP (ref 30–115)
ALP SERPL-CCNC: 102 U/L — SIGNIFICANT CHANGE UP (ref 30–115)
ALT FLD-CCNC: 14 U/L — SIGNIFICANT CHANGE UP (ref 0–41)
ALT FLD-CCNC: 14 U/L — SIGNIFICANT CHANGE UP (ref 0–41)
ANION GAP SERPL CALC-SCNC: 5 MMOL/L — LOW (ref 7–14)
ANION GAP SERPL CALC-SCNC: 5 MMOL/L — LOW (ref 7–14)
AST SERPL-CCNC: 17 U/L — SIGNIFICANT CHANGE UP (ref 0–41)
AST SERPL-CCNC: 17 U/L — SIGNIFICANT CHANGE UP (ref 0–41)
BASOPHILS # BLD AUTO: 0.04 K/UL — SIGNIFICANT CHANGE UP (ref 0–0.2)
BASOPHILS # BLD AUTO: 0.04 K/UL — SIGNIFICANT CHANGE UP (ref 0–0.2)
BASOPHILS NFR BLD AUTO: 0.5 % — SIGNIFICANT CHANGE UP (ref 0–1)
BASOPHILS NFR BLD AUTO: 0.5 % — SIGNIFICANT CHANGE UP (ref 0–1)
BILIRUB SERPL-MCNC: <0.2 MG/DL — SIGNIFICANT CHANGE UP (ref 0.2–1.2)
BILIRUB SERPL-MCNC: <0.2 MG/DL — SIGNIFICANT CHANGE UP (ref 0.2–1.2)
BUN SERPL-MCNC: 13 MG/DL — SIGNIFICANT CHANGE UP (ref 10–20)
BUN SERPL-MCNC: 13 MG/DL — SIGNIFICANT CHANGE UP (ref 10–20)
CALCIUM SERPL-MCNC: 8.6 MG/DL — SIGNIFICANT CHANGE UP (ref 8.4–10.4)
CALCIUM SERPL-MCNC: 8.6 MG/DL — SIGNIFICANT CHANGE UP (ref 8.4–10.4)
CHLORIDE SERPL-SCNC: 98 MMOL/L — SIGNIFICANT CHANGE UP (ref 98–110)
CHLORIDE SERPL-SCNC: 98 MMOL/L — SIGNIFICANT CHANGE UP (ref 98–110)
CO2 SERPL-SCNC: 37 MMOL/L — HIGH (ref 17–32)
CO2 SERPL-SCNC: 37 MMOL/L — HIGH (ref 17–32)
CREAT SERPL-MCNC: 0.5 MG/DL — LOW (ref 0.7–1.5)
CREAT SERPL-MCNC: 0.5 MG/DL — LOW (ref 0.7–1.5)
CULTURE RESULTS: SIGNIFICANT CHANGE UP
CULTURE RESULTS: SIGNIFICANT CHANGE UP
EGFR: 109 ML/MIN/1.73M2 — SIGNIFICANT CHANGE UP
EGFR: 109 ML/MIN/1.73M2 — SIGNIFICANT CHANGE UP
EOSINOPHIL # BLD AUTO: 0.01 K/UL — SIGNIFICANT CHANGE UP (ref 0–0.7)
EOSINOPHIL # BLD AUTO: 0.01 K/UL — SIGNIFICANT CHANGE UP (ref 0–0.7)
EOSINOPHIL NFR BLD AUTO: 0.1 % — SIGNIFICANT CHANGE UP (ref 0–8)
EOSINOPHIL NFR BLD AUTO: 0.1 % — SIGNIFICANT CHANGE UP (ref 0–8)
GAS PNL BLDA: SIGNIFICANT CHANGE UP
GAS PNL BLDA: SIGNIFICANT CHANGE UP
GLUCOSE SERPL-MCNC: 172 MG/DL — HIGH (ref 70–99)
GLUCOSE SERPL-MCNC: 172 MG/DL — HIGH (ref 70–99)
HCT VFR BLD CALC: 33.3 % — LOW (ref 37–47)
HCT VFR BLD CALC: 33.3 % — LOW (ref 37–47)
HGB BLD-MCNC: 10.2 G/DL — LOW (ref 12–16)
HGB BLD-MCNC: 10.2 G/DL — LOW (ref 12–16)
IMM GRANULOCYTES NFR BLD AUTO: 1.6 % — HIGH (ref 0.1–0.3)
IMM GRANULOCYTES NFR BLD AUTO: 1.6 % — HIGH (ref 0.1–0.3)
LYMPHOCYTES # BLD AUTO: 0.98 K/UL — LOW (ref 1.2–3.4)
LYMPHOCYTES # BLD AUTO: 0.98 K/UL — LOW (ref 1.2–3.4)
LYMPHOCYTES # BLD AUTO: 13.2 % — LOW (ref 20.5–51.1)
LYMPHOCYTES # BLD AUTO: 13.2 % — LOW (ref 20.5–51.1)
MAGNESIUM SERPL-MCNC: 2 MG/DL — SIGNIFICANT CHANGE UP (ref 1.8–2.4)
MAGNESIUM SERPL-MCNC: 2 MG/DL — SIGNIFICANT CHANGE UP (ref 1.8–2.4)
MCHC RBC-ENTMCNC: 23.8 PG — LOW (ref 27–31)
MCHC RBC-ENTMCNC: 23.8 PG — LOW (ref 27–31)
MCHC RBC-ENTMCNC: 30.6 G/DL — LOW (ref 32–37)
MCHC RBC-ENTMCNC: 30.6 G/DL — LOW (ref 32–37)
MCV RBC AUTO: 77.6 FL — LOW (ref 81–99)
MCV RBC AUTO: 77.6 FL — LOW (ref 81–99)
MONOCYTES # BLD AUTO: 0.52 K/UL — SIGNIFICANT CHANGE UP (ref 0.1–0.6)
MONOCYTES # BLD AUTO: 0.52 K/UL — SIGNIFICANT CHANGE UP (ref 0.1–0.6)
MONOCYTES NFR BLD AUTO: 7 % — SIGNIFICANT CHANGE UP (ref 1.7–9.3)
MONOCYTES NFR BLD AUTO: 7 % — SIGNIFICANT CHANGE UP (ref 1.7–9.3)
NEUTROPHILS # BLD AUTO: 5.76 K/UL — SIGNIFICANT CHANGE UP (ref 1.4–6.5)
NEUTROPHILS # BLD AUTO: 5.76 K/UL — SIGNIFICANT CHANGE UP (ref 1.4–6.5)
NEUTROPHILS NFR BLD AUTO: 77.6 % — HIGH (ref 42.2–75.2)
NEUTROPHILS NFR BLD AUTO: 77.6 % — HIGH (ref 42.2–75.2)
NRBC # BLD: 0 /100 WBCS — SIGNIFICANT CHANGE UP (ref 0–0)
NRBC # BLD: 0 /100 WBCS — SIGNIFICANT CHANGE UP (ref 0–0)
PHOSPHATE SERPL-MCNC: 2 MG/DL — LOW (ref 2.1–4.9)
PHOSPHATE SERPL-MCNC: 2 MG/DL — LOW (ref 2.1–4.9)
PLATELET # BLD AUTO: 317 K/UL — SIGNIFICANT CHANGE UP (ref 130–400)
PLATELET # BLD AUTO: 317 K/UL — SIGNIFICANT CHANGE UP (ref 130–400)
PMV BLD: 9.8 FL — SIGNIFICANT CHANGE UP (ref 7.4–10.4)
PMV BLD: 9.8 FL — SIGNIFICANT CHANGE UP (ref 7.4–10.4)
POTASSIUM SERPL-MCNC: 3.7 MMOL/L — SIGNIFICANT CHANGE UP (ref 3.5–5)
POTASSIUM SERPL-MCNC: 3.7 MMOL/L — SIGNIFICANT CHANGE UP (ref 3.5–5)
POTASSIUM SERPL-SCNC: 3.7 MMOL/L — SIGNIFICANT CHANGE UP (ref 3.5–5)
POTASSIUM SERPL-SCNC: 3.7 MMOL/L — SIGNIFICANT CHANGE UP (ref 3.5–5)
PROT SERPL-MCNC: 5.8 G/DL — LOW (ref 6–8)
PROT SERPL-MCNC: 5.8 G/DL — LOW (ref 6–8)
RBC # BLD: 4.29 M/UL — SIGNIFICANT CHANGE UP (ref 4.2–5.4)
RBC # BLD: 4.29 M/UL — SIGNIFICANT CHANGE UP (ref 4.2–5.4)
RBC # FLD: 25.8 % — HIGH (ref 11.5–14.5)
RBC # FLD: 25.8 % — HIGH (ref 11.5–14.5)
SODIUM SERPL-SCNC: 140 MMOL/L — SIGNIFICANT CHANGE UP (ref 135–146)
SODIUM SERPL-SCNC: 140 MMOL/L — SIGNIFICANT CHANGE UP (ref 135–146)
SPECIMEN SOURCE: SIGNIFICANT CHANGE UP
SPECIMEN SOURCE: SIGNIFICANT CHANGE UP
WBC # BLD: 7.43 K/UL — SIGNIFICANT CHANGE UP (ref 4.8–10.8)
WBC # BLD: 7.43 K/UL — SIGNIFICANT CHANGE UP (ref 4.8–10.8)
WBC # FLD AUTO: 7.43 K/UL — SIGNIFICANT CHANGE UP (ref 4.8–10.8)
WBC # FLD AUTO: 7.43 K/UL — SIGNIFICANT CHANGE UP (ref 4.8–10.8)

## 2023-10-22 PROCEDURE — 93010 ELECTROCARDIOGRAM REPORT: CPT

## 2023-10-22 PROCEDURE — 71045 X-RAY EXAM CHEST 1 VIEW: CPT | Mod: 26,76

## 2023-10-22 PROCEDURE — 99291 CRITICAL CARE FIRST HOUR: CPT

## 2023-10-22 RX ORDER — SODIUM CHLORIDE 9 MG/ML
1000 INJECTION, SOLUTION INTRAVENOUS ONCE
Refills: 0 | Status: COMPLETED | OUTPATIENT
Start: 2023-10-22 | End: 2023-10-22

## 2023-10-22 RX ORDER — HYDROCORTISONE 20 MG
50 TABLET ORAL EVERY 12 HOURS
Refills: 0 | Status: DISCONTINUED | OUTPATIENT
Start: 2023-10-22 | End: 2023-10-23

## 2023-10-22 RX ADMIN — CASPOFUNGIN ACETATE 260 MILLIGRAM(S): 7 INJECTION, POWDER, LYOPHILIZED, FOR SOLUTION INTRAVENOUS at 07:52

## 2023-10-22 RX ADMIN — POLYETHYLENE GLYCOL 3350 17 GRAM(S): 17 POWDER, FOR SOLUTION ORAL at 12:28

## 2023-10-22 RX ADMIN — MEROPENEM 100 MILLIGRAM(S): 1 INJECTION INTRAVENOUS at 01:00

## 2023-10-22 RX ADMIN — LEVETIRACETAM 500 MILLIGRAM(S): 250 TABLET, FILM COATED ORAL at 05:18

## 2023-10-22 RX ADMIN — SODIUM CHLORIDE 1000 MILLILITER(S): 9 INJECTION, SOLUTION INTRAVENOUS at 18:32

## 2023-10-22 RX ADMIN — CHLORHEXIDINE GLUCONATE 1 APPLICATION(S): 213 SOLUTION TOPICAL at 05:17

## 2023-10-22 RX ADMIN — MEROPENEM 100 MILLIGRAM(S): 1 INJECTION INTRAVENOUS at 17:28

## 2023-10-22 RX ADMIN — MIDODRINE HYDROCHLORIDE 10 MILLIGRAM(S): 2.5 TABLET ORAL at 13:29

## 2023-10-22 RX ADMIN — Medication 50 MILLIGRAM(S): at 17:25

## 2023-10-22 RX ADMIN — MIDODRINE HYDROCHLORIDE 10 MILLIGRAM(S): 2.5 TABLET ORAL at 05:19

## 2023-10-22 RX ADMIN — Medication 1 TABLET(S): at 12:27

## 2023-10-22 RX ADMIN — ENOXAPARIN SODIUM 50 MILLIGRAM(S): 100 INJECTION SUBCUTANEOUS at 05:18

## 2023-10-22 RX ADMIN — Medication 5.68 MICROGRAM(S)/KG/MIN: at 07:52

## 2023-10-22 RX ADMIN — ENOXAPARIN SODIUM 50 MILLIGRAM(S): 100 INJECTION SUBCUTANEOUS at 17:25

## 2023-10-22 RX ADMIN — RALOXIFENE HYDROCHLORIDE 60 MILLIGRAM(S): 60 TABLET, COATED ORAL at 12:27

## 2023-10-22 RX ADMIN — PANTOPRAZOLE SODIUM 40 MILLIGRAM(S): 20 TABLET, DELAYED RELEASE ORAL at 05:19

## 2023-10-22 RX ADMIN — LEVETIRACETAM 500 MILLIGRAM(S): 250 TABLET, FILM COATED ORAL at 17:25

## 2023-10-22 RX ADMIN — MIDODRINE HYDROCHLORIDE 10 MILLIGRAM(S): 2.5 TABLET ORAL at 21:27

## 2023-10-22 RX ADMIN — GABAPENTIN 600 MILLIGRAM(S): 400 CAPSULE ORAL at 12:27

## 2023-10-22 RX ADMIN — PANTOPRAZOLE SODIUM 40 MILLIGRAM(S): 20 TABLET, DELAYED RELEASE ORAL at 17:25

## 2023-10-22 RX ADMIN — MEROPENEM 100 MILLIGRAM(S): 1 INJECTION INTRAVENOUS at 09:54

## 2023-10-22 RX ADMIN — Medication 50 MILLIGRAM(S): at 05:18

## 2023-10-22 NOTE — PROGRESS NOTE ADULT - SUBJECTIVE AND OBJECTIVE BOX
24H events:    Patient is a 58y old Female who presents with a chief complaint of Pre-syncope (21 Oct 2023 11:48)    Primary diagnosis of Pre-syncope      Day 1:  Day 2:  Day 3:     Today is hospital day 8d. This morning patient was seen and examined at bedside, resting comfortably in bed.    No acute or major events overnight.    Code Status:    Family communication:  Contact date:  Name of person contacted:  Relationship to patient:  Communication details:  What matters most:    PAST MEDICAL & SURGICAL HISTORY  Down syndrome    Osteoporosis    Mild anemia    Neuropathy    S/P debridement  of R hip on 3/2/21      SOCIAL HISTORY:  Social History:  Lives at nursing home (14 Oct 2023 20:33)      ALLERGIES:  No Known Allergies    MEDICATIONS:  STANDING MEDICATIONS  caspofungin IVPB 50 milliGRAM(s) IV Intermittent every 24 hours  caspofungin IVPB      chlorhexidine 2% Cloths 1 Application(s) Topical <User Schedule>  enoxaparin Injectable 50 milliGRAM(s) SubCutaneous every 12 hours  gabapentin 600 milliGRAM(s) Oral daily  hydrocortisone sodium succinate Injectable 50 milliGRAM(s) IV Push every 8 hours  levETIRAcetam 500 milliGRAM(s) Oral two times a day  meropenem  IVPB 1000 milliGRAM(s) IV Intermittent every 8 hours  midodrine 10 milliGRAM(s) Oral every 8 hours  multivitamin 1 Tablet(s) Oral daily  norepinephrine Infusion 0.05 MICROgram(s)/kG/Min IV Continuous <Continuous>  pantoprazole   Suspension 40 milliGRAM(s) Oral two times a day  polyethylene glycol 3350 17 Gram(s) Oral daily  raloxifene 60 milliGRAM(s) Oral daily    PRN MEDICATIONS  acetaminophen     Tablet .. 650 milliGRAM(s) Oral every 6 hours PRN    VITALS:   T(F): 97.1  HR: 73  BP: 130/61  RR: 17  SpO2: 94%    PHYSICAL EXAM:  GENERAL:   (  ) NAD, lying in bed comfortably     (  ) obtunded     (  ) lethargic     (  ) somnolent    HEAD:   (  ) Atraumatic     (  ) hematoma     (  ) laceration (specify location:       )     NECK:  (  ) Supple     (  ) neck stiffness     (  ) nuchal rigidity     (  )  no JVD     (  ) JVD present ( -- cm)    HEART:  Rate -->     (  ) normal rate     (  ) bradycardic     (  ) tachycardic  Rhythm -->     (  ) regular     (  ) regularly irregular     (  ) irregularly irregular  Murmurs -->     (  ) normal s1s2     (  ) systolic murmur     (  ) diastolic murmur     (  ) continuous murmur      (  ) S3 present     (  ) S4 present    LUNGS:   (  )Unlabored respirations     (  ) tachypnea  (  ) B/L air entry     (  ) decreased breath sounds in:  (location     )    (  ) no adventitious sound     (  ) crackles     (  ) wheezing      (  ) rhonchi      (specify location:       )  (  ) chest wall tenderness (specify location:       )    ABDOMEN:   (  ) Soft     (  ) tense   |   (  ) nondistended     (  ) distended   |   (  ) +BS     (  ) hypoactive bowel sounds     (  ) hyperactive bowel sounds  (  ) nontender     (  ) RUQ tenderness     (  ) RLQ tenderness     (  ) LLQ tenderness     (  ) epigastric tenderness     (  ) diffuse tenderness  (  ) Splenomegaly      (  ) Hepatomegaly      (  ) Jaundice     (  ) ecchymosis     EXTREMITIES:  (  ) Normal     (  ) Rash     (  ) ecchymosis     (  ) varicose veins      (  ) pitting edema     (  ) non-pitting edema   (  ) ulceration     (  ) gangrene:     (location:     )    NERVOUS SYSTEM:    (  ) A&Ox3     (  ) confused     (  ) lethargic  CN II-XII:     (  ) Intact     (  ) deficits found     (Specify:     )   Upper extremities:     (  ) no sensorimotor deficits     (  ) weakness     (  ) loss of proprioception/vibration     (  ) loss of touch/temperature (specify:    )  Lower extremities:     (  ) no sensorimotor deficits     (  ) weakness     (  ) loss of proprioception/vibration     (  ) loss of touch/temperature (specify:    )    SKIN:   (  ) No rashes or lesions     (  ) maculopapular rash     (  ) pustules     (  ) vesicles     (  ) ulcer     (  ) ecchymosis     (specify location:     )    AMPAC score:    (  ) Indwelling Madsen Catheter:   Date insterted:    Reason (  ) Critical illness     (  ) urinary retention    (  ) Accurate Ins/Outs Monitoring     (  ) CMO patient    (  ) Central Line:   Date inserted:  Location: (  ) Right IJ     (  ) Left IJ     (  ) Right Fem     (  ) Left Fem    (  ) SPC        (  ) pigtail       (  ) PEG tube       (  ) colostomy       (  ) jejunostomy  (  ) U-Dall    LABS:                        10.2   7.43  )-----------( 317      ( 22 Oct 2023 04:30 )             33.3     10-22    140  |  98  |  13  ----------------------------<  172<H>  3.7   |  37<H>  |  0.5<L>    Ca    8.6      22 Oct 2023 04:30  Phos  2.0     10-22  Mg     2.0     10-22    TPro  5.8<L>  /  Alb  2.6<L>  /  TBili  <0.2  /  DBili  x   /  AST  17  /  ALT  14  /  AlkPhos  102  10-22      Urinalysis Basic - ( 22 Oct 2023 04:30 )    Color: x / Appearance: x / SG: x / pH: x  Gluc: 172 mg/dL / Ketone: x  / Bili: x / Urobili: x   Blood: x / Protein: x / Nitrite: x   Leuk Esterase: x / RBC: x / WBC x   Sq Epi: x / Non Sq Epi: x / Bacteria: x      ABG - ( 21 Oct 2023 12:20 )  pH, Arterial: 7.56  pH, Blood: x     /  pCO2: 43    /  pO2: 122   / HCO3: 38    / Base Excess: 14.6  /  SaO2: 99.8                      RADIOLOGY:

## 2023-10-22 NOTE — PROGRESS NOTE ADULT - SUBJECTIVE AND OBJECTIVE BOX
Patient is a 58y old  Female who presents with a chief complaint of Pre-syncope (22 Oct 2023 07:45)        Over Night Events:  SP extubation yesterday.  Off Levophed since 6 am.  on NC         ROS:     All ROS are negative except HPI         PHYSICAL EXAM    ICU Vital Signs Last 24 Hrs  T(C): 36.6 (22 Oct 2023 08:00), Max: 37.3 (22 Oct 2023 00:00)  T(F): 97.8 (22 Oct 2023 08:00), Max: 99.1 (22 Oct 2023 00:00)  HR: 69 (22 Oct 2023 08:15) (49 - 111)  BP: 146/81 (22 Oct 2023 08:15) (72/40 - 146/81)  BP(mean): 100 (22 Oct 2023 08:15) (50 - 100)  ABP: --  ABP(mean): --  RR: 17 (22 Oct 2023 08:15) (8 - 49)  SpO2: 100% (22 Oct 2023 08:15) (86% - 100%)    O2 Parameters below as of 22 Oct 2023 08:00  Patient On (Oxygen Delivery Method): nasal cannula  O2 Flow (L/min): 3          CONSTITUTIONAL:  IN  NAD    ENT:   Airway patent,   Mouth with normal mucosa.       EYES:   Pupils equal,   Round and reactive to light.    CARDIAC:   Normal rate,   Regular rhythm.      RESPIRATORY:   No wheezing  Bilateral BS  Normal chest expansion  Not tachypneic,  No use of accessory muscles    GASTROINTESTINAL:  Abdomen soft,   Non-tender,   No guarding,   + BS    MUSCULOSKELETAL:   Range of motion is not limited,  No clubbing, cyanosis    NEUROLOGICAL:   Alert     SKIN:   Skin normal color for race,   No evidence of rash.      10-21-23 @ 07:01  -  10-22-23 @ 07:00  --------------------------------------------------------  IN:    Enteral Tube Flush: 200 mL    FentaNYL: 9.1 mL    IV PiggyBack: 500 mL    Miscellaneous Tube Feedin mL    Norepinephrine: 10.1 mL    Norepinephrine: 52.3 mL  Total IN: 1371.4 mL    OUT:    Indwelling Catheter - Urethral (mL): 1975 mL  Total OUT: 1975 mL    Total NET: -603.6 mL      10-22-23 @ 07:01  -  10-22-23 @ 08:34  --------------------------------------------------------  IN:    IV PiggyBack: 250 mL  Total IN: 250 mL    OUT:    Indwelling Catheter - Urethral (mL): 30 mL    Norepinephrine: 0 mL  Total OUT: 30 mL    Total NET: 220 mL          LABS:                            10.2   7.43  )-----------( 317      ( 22 Oct 2023 04:30 )             33.3                                               10-22    140  |  98  |  13  ----------------------------<  172<H>  3.7   |  37<H>  |  0.5<L>    Ca    8.6      22 Oct 2023 04:30  Phos  2.0     10-22  Mg     2.0     10-22    TPro  5.8<L>  /  Alb  2.6<L>  /  TBili  <0.2  /  DBili  x   /  AST  17  /  ALT  14  /  AlkPhos  102  10-22                                             Urinalysis Basic - ( 22 Oct 2023 04:30 )    Color: x / Appearance: x / SG: x / pH: x  Gluc: 172 mg/dL / Ketone: x  / Bili: x / Urobili: x   Blood: x / Protein: x / Nitrite: x   Leuk Esterase: x / RBC: x / WBC x   Sq Epi: x / Non Sq Epi: x / Bacteria: x                                                  LIVER FUNCTIONS - ( 22 Oct 2023 04:30 )  Alb: 2.6 g/dL / Pro: 5.8 g/dL / ALK PHOS: 102 U/L / ALT: 14 U/L / AST: 17 U/L / GGT: x                                                                                               Mode: CPAP with PS  FiO2: 40  PEEP: 8  PS: 6                                      ABG - ( 21 Oct 2023 12:20 )  pH, Arterial: 7.56  pH, Blood: x     /  pCO2: 43    /  pO2: 122   / HCO3: 38    / Base Excess: 14.6  /  SaO2: 99.8                MEDICATIONS  (STANDING):  caspofungin IVPB 50 milliGRAM(s) IV Intermittent every 24 hours  caspofungin IVPB      chlorhexidine 2% Cloths 1 Application(s) Topical <User Schedule>  enoxaparin Injectable 50 milliGRAM(s) SubCutaneous every 12 hours  gabapentin 600 milliGRAM(s) Oral daily  hydrocortisone sodium succinate Injectable 50 milliGRAM(s) IV Push every 8 hours  levETIRAcetam 500 milliGRAM(s) Oral two times a day  meropenem  IVPB 1000 milliGRAM(s) IV Intermittent every 8 hours  midodrine 10 milliGRAM(s) Oral every 8 hours  multivitamin 1 Tablet(s) Oral daily  norepinephrine Infusion 0.05 MICROgram(s)/kG/Min (5.68 mL/Hr) IV Continuous <Continuous>  pantoprazole   Suspension 40 milliGRAM(s) Oral two times a day  polyethylene glycol 3350 17 Gram(s) Oral daily  raloxifene 60 milliGRAM(s) Oral daily    MEDICATIONS  (PRN):  acetaminophen     Tablet .. 650 milliGRAM(s) Oral every 6 hours PRN Temp greater or equal to 38C (100.4F), Mild Pain (1 - 3)      New X-rays reviewed:                                                                                  ECHO

## 2023-10-22 NOTE — CHART NOTE - NSCHARTNOTEFT_GEN_A_CORE
Transfer from: ICU  Transfer to: SDU    HPI:  Patient is a 58 year old female with a PMHx of Down syndrome, nonverbal at baseline, hypothyroidism, cerebral palsy, congenital pulmonary stenosis, Seizures, presents to the ED from nursing facility for syncope associated with tonic-clonic movement witnessed by aide. Initial vitals at nursing home showed bradycardia and hypoxia for about 10-15 minutes. No fevers, chill, cough, vomiting, diarrhea, difficulty breathing.     Hospital course 9/29/2023 -> 10/13/2023 Patient was admitted  for hemoptysis and duodenal perforation requiring intubation and ICU admission for hypovolemic and septic shock requiring temporary pressor support, which resolved. Patient also had acute hypoxemic respiratory failure likely due to multifocal pneumonia. Patient was successfully extubated and completed a course of antibiotics per ID, further infectious workup was unremarkable. Repeat CTAP with contrast showed no contrast extravasation and previous seen foci of retroperitoneal gas no longer identified. The patient was evaluated by surgery and GI, and no acute intervention recommended. Patient with stable hemoglobin. Hospital course complicated by NELA likely prerenal which resolved and NSTEMI likely type II due to shock (Echo 9/30 EF 55 to 60%, Normal left systolic function Diastolic function could not be assessed) with subsequent downtrending trops (0.34 --> 0.11). Patient's hospital course ICU --> Stepdown --> General medical floor. Patient remains hemodynamically stable on general medical, tolerating pureed feeds, and has bowel movements. Patient was discharged on 10/13/2023     Arrive to ED 10/14/2023 from nursing facility for syncope associated with tonic-clonic movement witnessed by aide. Initial vitals at nursing home showed bradycardia and hypoxia for about 10-15 minutes. No fevers, chill, cough, vomiting, diarrhea, difficulty breathing.      - Vital signs: BP: 86/55, HR: 77, RR: 24, SpO2: 98% on 4L NC  - Labs showed troponin: 0,02 ( was 0.34 and then 0,11 2 weeks ago), Creatinine 1.1 ( 0.7 on 10/13)   - Head CT negative for acute pathology  - rEEG: generalized slowing, no seizure activity  - Echo results 9/30 EF 55 to 60 %, Normal left systolic function Diastolic function could not be assessed.   - Now off tele (recent NSTEMI)  - Neuro recs: continue Gabapentin 600mg PO QD F/u outpatient  - zosyn started (10/17) for LLL PNA and infected foot ulcer (podiatry and ID following)    10/17/23: Patient had possible aspiration episode this morning around 6am (per night resident), and was desaturating to low 80s, Cxr showed worsening lower lobe pna > transitioned to nonrebreather > maxed out on nonrebreather satting at 91%, BP 88/52, using accessory muscles > transitioned to high flow > no improvement in SpO2, BP 80s/50s > RR called > anesthesia intubated > peripheral IV access acquired and started levophed, and precedex > BP 90s/50s > gave propofol during central line placement > levophed transitioned to central line. Patient is stable and will be transferred to ICU.    MICU course:  Patient arrived to ICU intubated and sedated. Patient on levophed, then weaned off pressors and started on midodrine. Patient initially on precedex, then fentanyl, then weaned off sedation. Serial CXR showing grossly stable b/l lung opacities. Patient successfully extubated on 10/21.   Patient found to have DVT in left common fem and was started on therapeutic Lovenox. Hemoglobin and BP have been stable while on full AC (patient has recent h/o duodenal perforation). CT angio done 10/18 negative for PE.   ID is following, patient on meropenem for PNA. Fungitell elevated, patient started on caspofungin.   On 10/17 patient noted to have episode of whole body rhythmic shaking, left arm jerking, abnormal eye movements. 2mg ativan given. vEEG showed generalized slowing, diffusely expressed triphasic waves, right posterior quadrant sharp waves that are probably epileptiform and sharp transients, small number of stimulus-dependent posterior periodic dischargees. Per neuro patient started on Keppra 500mg bid.   Patient has osteomyelitis of right foot. Xray right foot showed post transmetatarsal amputation of the first ray with periosteal reaction at the amputation stump, suspicious for recurrent osteomyelitis. There is second toe ulcer with osseous erosion/destruction at the second distal phalangeal tuft, consistent with osteomyelitis. MR right foot - 1.  Limited exam. 2.  Osteomyelitis of the first metatarsal stump. 3.  Osteomyelitis of the second toe distal phalanx. Podiatry is following, was initially scheduled for OR debridement on 10/20, which was cancelled d/t patient condition. Podiatry will reassess this week and reschedule surgery.   Speech and swallow following - patient not appropriate for po trials as this time, continue with NPO with tube feeds.     To do:  [ ] f/u ID - pt on meropenem and caspofungin   [ ] f/u podiatry for OR debridement for OM   [ ] f/u speech and swallow   [ ] f/u neuro regarding continuing keppra   [ ] monitor CBC while on full AC       LABS:                        10.2   7.43  )-----------( 317      ( 22 Oct 2023 04:30 )             33.3     10-22    140  |  98  |  13  ----------------------------<  172<H>  3.7   |  37<H>  |  0.5<L>    Ca    8.6      22 Oct 2023 04:30  Phos  2.0     10-22  Mg     2.0     10-22    TPro  5.8<L>  /  Alb  2.6<L>  /  TBili  <0.2  /  DBili  x   /  AST  17  /  ALT  14  /  AlkPhos  102  10-22    VITALS:   T(C): 36.6 (10-22-23 @ 08:00), Max: 37.3 (10-22-23 @ 00:00)  HR: 75 (10-22-23 @ 10:00) (47 - 111)  BP: 105/68 (10-22-23 @ 10:00) (72/40 - 146/81)  RR: 19 (10-22-23 @ 10:00) (11 - 49)  SpO2: 96% (10-22-23 @ 10:00) (86% - 100%)    GENERAL: NAD, lying in bed comfortably  HEAD:  Atraumatic, normocephalic  EYES: EOMI, PERRLA  ENT: Moist mucous membranes  NECK: Supple  HEART: Regular rate and rhythm  LUNGS: Unlabored respirations.  Clear to auscultation bilaterally  ABDOMEN: Soft, nontender, nondistended  EXTREMITIES: 2+ peripheral pulses bilaterally  NERVOUS SYSTEM:  Alert, not oriented. does not follow commands   SKIN: No rashes or lesions Transfer from: ICU  Transfer to: SDU    HPI:  Patient is a 58 year old female with a PMHx of Down syndrome, nonverbal at baseline, hypothyroidism, cerebral palsy, congenital pulmonary stenosis, Seizures, presents to the ED from nursing facility for syncope associated with tonic-clonic movement witnessed by aide. Initial vitals at nursing home showed bradycardia and hypoxia for about 10-15 minutes. No fevers, chill, cough, vomiting, diarrhea, difficulty breathing.     Hospital course 9/29/2023 -> 10/13/2023 Patient was admitted  for hemoptysis and duodenal perforation requiring intubation and ICU admission for hypovolemic and septic shock requiring temporary pressor support, which resolved. Patient also had acute hypoxemic respiratory failure likely due to multifocal pneumonia. Patient was successfully extubated and completed a course of antibiotics per ID, further infectious workup was unremarkable. Repeat CTAP with contrast showed no contrast extravasation and previous seen foci of retroperitoneal gas no longer identified. The patient was evaluated by surgery and GI, and no acute intervention recommended. Patient with stable hemoglobin. Hospital course complicated by NELA likely prerenal which resolved and NSTEMI likely type II due to shock (Echo 9/30 EF 55 to 60%, Normal left systolic function Diastolic function could not be assessed) with subsequent downtrending trops (0.34 --> 0.11). Patient's hospital course ICU --> Stepdown --> General medical floor. Patient remains hemodynamically stable on general medical, tolerating pureed feeds, and has bowel movements. Patient was discharged on 10/13/2023     Arrive to ED 10/14/2023 from nursing facility for syncope associated with tonic-clonic movement witnessed by aide. Initial vitals at nursing home showed bradycardia and hypoxia for about 10-15 minutes. No fevers, chill, cough, vomiting, diarrhea, difficulty breathing.      - Vital signs: BP: 86/55, HR: 77, RR: 24, SpO2: 98% on 4L NC  - Labs showed troponin: 0,02 ( was 0.34 and then 0,11 2 weeks ago), Creatinine 1.1 ( 0.7 on 10/13)   - Head CT negative for acute pathology  - rEEG: generalized slowing, no seizure activity  - Echo results 9/30 EF 55 to 60 %, Normal left systolic function Diastolic function could not be assessed.   - Now off tele (recent NSTEMI)  - Neuro recs: continue Gabapentin 600mg PO QD F/u outpatient  - zosyn started (10/17) for LLL PNA and infected foot ulcer (podiatry and ID following)    10/17/23: Patient had possible aspiration episode this morning around 6am (per night resident), and was desaturating to low 80s, Cxr showed worsening lower lobe pna > transitioned to nonrebreather > maxed out on nonrebreather satting at 91%, BP 88/52, using accessory muscles > transitioned to high flow > no improvement in SpO2, BP 80s/50s > RR called > anesthesia intubated > peripheral IV access acquired and started levophed, and precedex > BP 90s/50s > gave propofol during central line placement > levophed transitioned to central line. Patient is stable and will be transferred to ICU.    MICU course:  Patient arrived to ICU intubated and sedated. Patient on levophed, then weaned off pressors and started on midodrine. Patient initially on precedex, then fentanyl, then weaned off sedation. Serial CXR showing grossly stable b/l lung opacities. Patient successfully extubated on 10/21.   Patient found to have DVT in left common fem and was started on therapeutic Lovenox. Hemoglobin and BP have been stable while on full AC (patient has recent h/o duodenal perforation). CT angio done 10/18 negative for PE.   ID is following, patient on meropenem for PNA. Fungitell elevated, patient started on caspofungin.   On 10/17 patient noted to have episode of whole body rhythmic shaking, left arm jerking, abnormal eye movements. 2mg ativan given. vEEG showed generalized slowing, diffusely expressed triphasic waves, right posterior quadrant sharp waves that are probably epileptiform and sharp transients, small number of stimulus-dependent posterior periodic dischargees. Per neuro patient started on Keppra 500mg bid.   Patient has osteomyelitis of right foot. Xray right foot showed post transmetatarsal amputation of the first ray with periosteal reaction at the amputation stump, suspicious for recurrent osteomyelitis. There is second toe ulcer with osseous erosion/destruction at the second distal phalangeal tuft, consistent with osteomyelitis. MR right foot - 1.  Limited exam. 2.  Osteomyelitis of the first metatarsal stump. 3.  Osteomyelitis of the second toe distal phalanx. Podiatry is following, was initially scheduled for OR debridement on 10/20, which was cancelled d/t patient condition. Podiatry will reassess this week and reschedule surgery.   Speech and swallow following - patient not appropriate for po trials as this time, continue with NPO with tube feeds.   Patient is stable for transfer to SDU.     To do:  [ ] f/u ID - pt on meropenem and caspofungin   [ ] f/u podiatry for OR debridement for OM   [ ] f/u speech and swallow   [ ] f/u neuro regarding continuing keppra   [ ] monitor CBC while on full AC       LABS:                        10.2   7.43  )-----------( 317      ( 22 Oct 2023 04:30 )             33.3     10-22    140  |  98  |  13  ----------------------------<  172<H>  3.7   |  37<H>  |  0.5<L>    Ca    8.6      22 Oct 2023 04:30  Phos  2.0     10-22  Mg     2.0     10-22    TPro  5.8<L>  /  Alb  2.6<L>  /  TBili  <0.2  /  DBili  x   /  AST  17  /  ALT  14  /  AlkPhos  102  10-22    VITALS:   T(C): 36.6 (10-22-23 @ 08:00), Max: 37.3 (10-22-23 @ 00:00)  HR: 75 (10-22-23 @ 10:00) (47 - 111)  BP: 105/68 (10-22-23 @ 10:00) (72/40 - 146/81)  RR: 19 (10-22-23 @ 10:00) (11 - 49)  SpO2: 96% (10-22-23 @ 10:00) (86% - 100%)    GENERAL: NAD, lying in bed comfortably  HEAD:  Atraumatic, normocephalic  EYES: EOMI, PERRLA  ENT: Moist mucous membranes  NECK: Supple  HEART: Regular rate and rhythm  LUNGS: Unlabored respirations.  Clear to auscultation bilaterally  ABDOMEN: Soft, nontender, nondistended  EXTREMITIES: 2+ peripheral pulses bilaterally  NERVOUS SYSTEM:  Alert, not oriented. does not follow commands   SKIN: No rashes or lesions

## 2023-10-22 NOTE — SWALLOW BEDSIDE ASSESSMENT ADULT - SLP PERTINENT HISTORY OF CURRENT PROBLEM
Pt w/ hx significant for Down syndrome, nonverbal at baseline, cerebral palsy, seizures, neuropathy, congenital pulmonary stenosis presented for upper body shaking and syncope episode w/ bradycardia and severe hypotension.  Per neurology note: "VEEG showed right posterior quadrant sharp waves that are probably epileptiform and sharp transients."  Pt is s/p rapid response on 10/17 w/ subsequent intubation 2' AHRF.  CXR 10/17: "Worsening pneumonia, left lower lung." Pt w/ hx significant for Down syndrome, nonverbal at baseline, cerebral palsy, seizures, neuropathy, congenital pulmonary stenosis presented for upper body shaking and syncope episode w/ bradycardia and severe hypotension.  Per neurology note: "VEEG showed right posterior quadrant sharp waves that are probably epileptiform and sharp transients."  Pt is s/p rapid response on 10/17 w/ subsequent intubation 2' AHRF.  CXR 10/17: "Worsening pneumonia, left lower lung." Pt extubated 10/21

## 2023-10-22 NOTE — PROGRESS NOTE ADULT - ASSESSMENT
IMPRESSION:    Acute hypoxemic respiratory failure SP extubation   Aspiration pneumonia  DVT   Elevated Fungitell   HO recent duodenal perforation   foot OM  HO polymicrobial bacteremia   H/O CP  H/o seizures    PLAN:    CNS: Avoid CNS depressants     HEENT: Oral care.      PULMONARY: HOB at 45 degrees.  Aspiration precaution.  SaO2 92 TO 96%.  Pulmonary toilet.      CARDIOVASCULAR: Goal directed fluid resuscitation.  Continue Midodrine.        GI: Protonix BID.  NG feeding.  Bowel regimen     RENAL:  FU lytes.  Correct as needed.      INFECTIOUS DISEASE:  ABX PER ID, Continue caspo, FUNGITELL NOTED    HEMATOLOGICAL: DVT therapy.  Monitor CBC. Lovenox therapeutic    ENDOCRINE:  Follow up FS.  Insulin protocol if needed. HC q12    MUSCULOSKELETAL: Bedrest.  Off loading.  Wound care.      Prognosis guarded.      MICU     SDU     DC TLC

## 2023-10-23 LAB
ALBUMIN SERPL ELPH-MCNC: 2.8 G/DL — LOW (ref 3.5–5.2)
ALBUMIN SERPL ELPH-MCNC: 2.8 G/DL — LOW (ref 3.5–5.2)
ALP SERPL-CCNC: 91 U/L — SIGNIFICANT CHANGE UP (ref 30–115)
ALP SERPL-CCNC: 91 U/L — SIGNIFICANT CHANGE UP (ref 30–115)
ALT FLD-CCNC: 17 U/L — SIGNIFICANT CHANGE UP (ref 0–41)
ALT FLD-CCNC: 17 U/L — SIGNIFICANT CHANGE UP (ref 0–41)
ANION GAP SERPL CALC-SCNC: 9 MMOL/L — SIGNIFICANT CHANGE UP (ref 7–14)
ANION GAP SERPL CALC-SCNC: 9 MMOL/L — SIGNIFICANT CHANGE UP (ref 7–14)
AST SERPL-CCNC: 22 U/L — SIGNIFICANT CHANGE UP (ref 0–41)
AST SERPL-CCNC: 22 U/L — SIGNIFICANT CHANGE UP (ref 0–41)
BASOPHILS # BLD AUTO: 0 K/UL — SIGNIFICANT CHANGE UP (ref 0–0.2)
BASOPHILS # BLD AUTO: 0 K/UL — SIGNIFICANT CHANGE UP (ref 0–0.2)
BASOPHILS NFR BLD AUTO: 0 % — SIGNIFICANT CHANGE UP (ref 0–1)
BASOPHILS NFR BLD AUTO: 0 % — SIGNIFICANT CHANGE UP (ref 0–1)
BILIRUB SERPL-MCNC: <0.2 MG/DL — SIGNIFICANT CHANGE UP (ref 0.2–1.2)
BILIRUB SERPL-MCNC: <0.2 MG/DL — SIGNIFICANT CHANGE UP (ref 0.2–1.2)
BUN SERPL-MCNC: 14 MG/DL — SIGNIFICANT CHANGE UP (ref 10–20)
BUN SERPL-MCNC: 14 MG/DL — SIGNIFICANT CHANGE UP (ref 10–20)
CALCIUM SERPL-MCNC: 8.8 MG/DL — SIGNIFICANT CHANGE UP (ref 8.4–10.5)
CALCIUM SERPL-MCNC: 8.8 MG/DL — SIGNIFICANT CHANGE UP (ref 8.4–10.5)
CHLORIDE SERPL-SCNC: 100 MMOL/L — SIGNIFICANT CHANGE UP (ref 98–110)
CHLORIDE SERPL-SCNC: 100 MMOL/L — SIGNIFICANT CHANGE UP (ref 98–110)
CO2 SERPL-SCNC: 34 MMOL/L — HIGH (ref 17–32)
CO2 SERPL-SCNC: 34 MMOL/L — HIGH (ref 17–32)
CREAT SERPL-MCNC: 0.6 MG/DL — LOW (ref 0.7–1.5)
CREAT SERPL-MCNC: 0.6 MG/DL — LOW (ref 0.7–1.5)
EGFR: 105 ML/MIN/1.73M2 — SIGNIFICANT CHANGE UP
EGFR: 105 ML/MIN/1.73M2 — SIGNIFICANT CHANGE UP
EOSINOPHIL # BLD AUTO: 0 K/UL — SIGNIFICANT CHANGE UP (ref 0–0.7)
EOSINOPHIL # BLD AUTO: 0 K/UL — SIGNIFICANT CHANGE UP (ref 0–0.7)
EOSINOPHIL NFR BLD AUTO: 0 % — SIGNIFICANT CHANGE UP (ref 0–8)
EOSINOPHIL NFR BLD AUTO: 0 % — SIGNIFICANT CHANGE UP (ref 0–8)
GLUCOSE SERPL-MCNC: 184 MG/DL — HIGH (ref 70–99)
GLUCOSE SERPL-MCNC: 184 MG/DL — HIGH (ref 70–99)
HCT VFR BLD CALC: 36 % — LOW (ref 37–47)
HCT VFR BLD CALC: 36 % — LOW (ref 37–47)
HGB BLD-MCNC: 11 G/DL — LOW (ref 12–16)
HGB BLD-MCNC: 11 G/DL — LOW (ref 12–16)
LYMPHOCYTES # BLD AUTO: 1.29 K/UL — SIGNIFICANT CHANGE UP (ref 1.2–3.4)
LYMPHOCYTES # BLD AUTO: 1.29 K/UL — SIGNIFICANT CHANGE UP (ref 1.2–3.4)
LYMPHOCYTES # BLD AUTO: 23 % — SIGNIFICANT CHANGE UP (ref 20.5–51.1)
LYMPHOCYTES # BLD AUTO: 23 % — SIGNIFICANT CHANGE UP (ref 20.5–51.1)
MAGNESIUM SERPL-MCNC: 2 MG/DL — SIGNIFICANT CHANGE UP (ref 1.8–2.4)
MAGNESIUM SERPL-MCNC: 2 MG/DL — SIGNIFICANT CHANGE UP (ref 1.8–2.4)
MCHC RBC-ENTMCNC: 23.3 PG — LOW (ref 27–31)
MCHC RBC-ENTMCNC: 23.3 PG — LOW (ref 27–31)
MCHC RBC-ENTMCNC: 30.6 G/DL — LOW (ref 32–37)
MCHC RBC-ENTMCNC: 30.6 G/DL — LOW (ref 32–37)
MCV RBC AUTO: 76.1 FL — LOW (ref 81–99)
MCV RBC AUTO: 76.1 FL — LOW (ref 81–99)
MONOCYTES # BLD AUTO: 0.34 K/UL — SIGNIFICANT CHANGE UP (ref 0.1–0.6)
MONOCYTES # BLD AUTO: 0.34 K/UL — SIGNIFICANT CHANGE UP (ref 0.1–0.6)
MONOCYTES NFR BLD AUTO: 6 % — SIGNIFICANT CHANGE UP (ref 1.7–9.3)
MONOCYTES NFR BLD AUTO: 6 % — SIGNIFICANT CHANGE UP (ref 1.7–9.3)
NEUTROPHILS # BLD AUTO: 3.99 K/UL — SIGNIFICANT CHANGE UP (ref 1.4–6.5)
NEUTROPHILS # BLD AUTO: 3.99 K/UL — SIGNIFICANT CHANGE UP (ref 1.4–6.5)
NEUTROPHILS NFR BLD AUTO: 71 % — SIGNIFICANT CHANGE UP (ref 42.2–75.2)
NEUTROPHILS NFR BLD AUTO: 71 % — SIGNIFICANT CHANGE UP (ref 42.2–75.2)
NRBC # BLD: SIGNIFICANT CHANGE UP /100 WBCS (ref 0–0)
NRBC # BLD: SIGNIFICANT CHANGE UP /100 WBCS (ref 0–0)
PHOSPHATE SERPL-MCNC: 2.6 MG/DL — SIGNIFICANT CHANGE UP (ref 2.1–4.9)
PHOSPHATE SERPL-MCNC: 2.6 MG/DL — SIGNIFICANT CHANGE UP (ref 2.1–4.9)
PLATELET # BLD AUTO: 320 K/UL — SIGNIFICANT CHANGE UP (ref 130–400)
PLATELET # BLD AUTO: 320 K/UL — SIGNIFICANT CHANGE UP (ref 130–400)
PMV BLD: 10 FL — SIGNIFICANT CHANGE UP (ref 7.4–10.4)
PMV BLD: 10 FL — SIGNIFICANT CHANGE UP (ref 7.4–10.4)
POTASSIUM SERPL-MCNC: 3.4 MMOL/L — LOW (ref 3.5–5)
POTASSIUM SERPL-MCNC: 3.4 MMOL/L — LOW (ref 3.5–5)
POTASSIUM SERPL-SCNC: 3.4 MMOL/L — LOW (ref 3.5–5)
POTASSIUM SERPL-SCNC: 3.4 MMOL/L — LOW (ref 3.5–5)
PROT SERPL-MCNC: 5.9 G/DL — LOW (ref 6–8)
PROT SERPL-MCNC: 5.9 G/DL — LOW (ref 6–8)
RBC # BLD: 4.73 M/UL — SIGNIFICANT CHANGE UP (ref 4.2–5.4)
RBC # BLD: 4.73 M/UL — SIGNIFICANT CHANGE UP (ref 4.2–5.4)
RBC # FLD: 26.5 % — HIGH (ref 11.5–14.5)
RBC # FLD: 26.5 % — HIGH (ref 11.5–14.5)
SODIUM SERPL-SCNC: 143 MMOL/L — SIGNIFICANT CHANGE UP (ref 135–146)
SODIUM SERPL-SCNC: 143 MMOL/L — SIGNIFICANT CHANGE UP (ref 135–146)
WBC # BLD: 5.62 K/UL — SIGNIFICANT CHANGE UP (ref 4.8–10.8)
WBC # BLD: 5.62 K/UL — SIGNIFICANT CHANGE UP (ref 4.8–10.8)
WBC # FLD AUTO: 5.62 K/UL — SIGNIFICANT CHANGE UP (ref 4.8–10.8)
WBC # FLD AUTO: 5.62 K/UL — SIGNIFICANT CHANGE UP (ref 4.8–10.8)

## 2023-10-23 PROCEDURE — 99291 CRITICAL CARE FIRST HOUR: CPT

## 2023-10-23 PROCEDURE — 71045 X-RAY EXAM CHEST 1 VIEW: CPT | Mod: 26

## 2023-10-23 RX ORDER — POTASSIUM CHLORIDE 20 MEQ
20 PACKET (EA) ORAL ONCE
Refills: 0 | Status: COMPLETED | OUTPATIENT
Start: 2023-10-23 | End: 2023-10-23

## 2023-10-23 RX ORDER — POTASSIUM CHLORIDE 20 MEQ
20 PACKET (EA) ORAL ONCE
Refills: 0 | Status: DISCONTINUED | OUTPATIENT
Start: 2023-10-23 | End: 2023-10-23

## 2023-10-23 RX ORDER — SODIUM CHLORIDE 9 MG/ML
250 INJECTION INTRAMUSCULAR; INTRAVENOUS; SUBCUTANEOUS ONCE
Refills: 0 | Status: COMPLETED | OUTPATIENT
Start: 2023-10-23 | End: 2023-10-23

## 2023-10-23 RX ORDER — HYDROCORTISONE 20 MG
50 TABLET ORAL DAILY
Refills: 0 | Status: DISCONTINUED | OUTPATIENT
Start: 2023-10-23 | End: 2023-10-24

## 2023-10-23 RX ADMIN — LEVETIRACETAM 500 MILLIGRAM(S): 250 TABLET, FILM COATED ORAL at 05:06

## 2023-10-23 RX ADMIN — CHLORHEXIDINE GLUCONATE 1 APPLICATION(S): 213 SOLUTION TOPICAL at 05:05

## 2023-10-23 RX ADMIN — MEROPENEM 100 MILLIGRAM(S): 1 INJECTION INTRAVENOUS at 09:49

## 2023-10-23 RX ADMIN — Medication 50 MILLIEQUIVALENT(S): at 06:49

## 2023-10-23 RX ADMIN — MEROPENEM 100 MILLIGRAM(S): 1 INJECTION INTRAVENOUS at 02:16

## 2023-10-23 RX ADMIN — MEROPENEM 100 MILLIGRAM(S): 1 INJECTION INTRAVENOUS at 17:26

## 2023-10-23 RX ADMIN — ENOXAPARIN SODIUM 50 MILLIGRAM(S): 100 INJECTION SUBCUTANEOUS at 05:06

## 2023-10-23 RX ADMIN — Medication 1 TABLET(S): at 12:40

## 2023-10-23 RX ADMIN — Medication 50 MILLIGRAM(S): at 05:06

## 2023-10-23 RX ADMIN — MIDODRINE HYDROCHLORIDE 10 MILLIGRAM(S): 2.5 TABLET ORAL at 13:03

## 2023-10-23 RX ADMIN — PANTOPRAZOLE SODIUM 40 MILLIGRAM(S): 20 TABLET, DELAYED RELEASE ORAL at 17:26

## 2023-10-23 RX ADMIN — MIDODRINE HYDROCHLORIDE 10 MILLIGRAM(S): 2.5 TABLET ORAL at 21:14

## 2023-10-23 RX ADMIN — RALOXIFENE HYDROCHLORIDE 60 MILLIGRAM(S): 60 TABLET, COATED ORAL at 12:40

## 2023-10-23 RX ADMIN — PANTOPRAZOLE SODIUM 40 MILLIGRAM(S): 20 TABLET, DELAYED RELEASE ORAL at 05:06

## 2023-10-23 RX ADMIN — MIDODRINE HYDROCHLORIDE 10 MILLIGRAM(S): 2.5 TABLET ORAL at 05:06

## 2023-10-23 RX ADMIN — ENOXAPARIN SODIUM 50 MILLIGRAM(S): 100 INJECTION SUBCUTANEOUS at 17:26

## 2023-10-23 RX ADMIN — CASPOFUNGIN ACETATE 260 MILLIGRAM(S): 7 INJECTION, POWDER, LYOPHILIZED, FOR SOLUTION INTRAVENOUS at 09:47

## 2023-10-23 RX ADMIN — Medication 20 MILLIEQUIVALENT(S): at 09:55

## 2023-10-23 RX ADMIN — GABAPENTIN 600 MILLIGRAM(S): 400 CAPSULE ORAL at 12:40

## 2023-10-23 RX ADMIN — Medication 50 MILLIGRAM(S): at 09:49

## 2023-10-23 RX ADMIN — LEVETIRACETAM 500 MILLIGRAM(S): 250 TABLET, FILM COATED ORAL at 17:26

## 2023-10-23 NOTE — PROGRESS NOTE ADULT - ASSESSMENT
ASSESSMENT   58 year old female with a PMHx of Down syndrome, nonverbal at baseline, hypothyroidism, cerebral palsy, congenital pulmonary stenosis, Seizures, presents to the ED from nursing facility for syncope associated with tonic-clonic movement witnessed by aide. Initial vitals at nursing home showed bradycardia and hypoxia for about 10-15 minutes. N    IMPRESSION  #Seizure like activity   #Right planter foot ulcers - two full thickness ulcers - serous drainage with mild erythema with OM  - MR Foot No Cont, Right (10.16.23 @ 21:51): IMPRESSION: 1.  Limited exam. 2.  Osteomyelitis of the first metatarsal stump. 3.  Osteomyelitis of the second toe distal phalanx.    #Rapid Response 10/17   #HAP   - Xray Chest 1 View- PORTABLE-Urgent (Xray Chest 1 View- PORTABLE-Urgent .) (10.17.23 @ 06:10): Worsening pneumonia, left lower lung.  - trach Cx 10/17 NG   - Procalcitonin, Serum: 4.36(10.17.23 @ 17:20)      #Elevated Fungitell   Fungitell: >500 pg/mL (10.17.23 @ 17:20)    #Buttock decubitus ulcer     #Recently Treated Multifocal PNA    #Down syndrome/Crebral Palsy   #Obesity BMI (kg/m2): 23.7, 26.3  #Abx allergy: No Known Allergies    RECOMMENDATIONS  - meropenem 1g q 8 hours (start date 10/17)   - repeat procalcitonin to help guide course  - elevated fungitell non-specific -- on caspofungin 50 mg daily for 7 days    Please call or message on [employer] Teams if with any questions.  Spectra [phone]

## 2023-10-23 NOTE — PROGRESS NOTE ADULT - ASSESSMENT
IMPRESSION:    Acute hypoxemic respiratory failure SP extubation   Aspiration pneumonia  DVT   Elevated Fungitell   HO recent duodenal perforation   foot OM  HO polymicrobial bacteremia   H/O CP  H/o seizures    PLAN:    CNS: Avoid CNS depressants     HEENT: Oral care.      PULMONARY: HOB at 45 degrees.  Aspiration precaution.  Wean o2 as tolerated.  Keep SaO2 92 TO 96%.  Pulmonary toilet.  Low threshold for intubation.      CARDIOVASCULAR: Goal directed fluid resuscitation.  Continue Midodrine.        GI: Protonix BID.  NG feeding.  Bowel regimen     RENAL:  FU lytes.  Correct as needed.      INFECTIOUS DISEASE:  Continue ABX and Anti fungal per ID    HEMATOLOGICAL: DVT therapy.  Monitor CBC. Lovenox therapeutic    ENDOCRINE:  Follow up FS.  Insulin protocol if needed. HC q24    MUSCULOSKELETAL: Bedrest.  Off loading.  Wound care.      Prognosis guarded.      DC TLC     Madsen for retention

## 2023-10-23 NOTE — PROGRESS NOTE ADULT - SUBJECTIVE AND OBJECTIVE BOX
24H events:    Patient is a 58y old Female who presents with a chief complaint of Pre-syncope (23 Oct 2023 09:03)    Primary diagnosis of Pre-syncope      Day 1:  Day 2:  Day 3:     Today is hospital day 9d. This morning patient was seen and examined at bedside, resting comfortably in bed.    OVN, patient desatting and placed on HFNC.    Code Status:    Family communication:  Contact date:  Name of person contacted:  Relationship to patient:  Communication details:  What matters most:    PAST MEDICAL & SURGICAL HISTORY  Down syndrome    Osteoporosis    Mild anemia    Neuropathy    S/P debridement  of R hip on 3/2/21      SOCIAL HISTORY:  Social History:  Lives at nursing home (14 Oct 2023 20:33)      ALLERGIES:  No Known Allergies    MEDICATIONS:  STANDING MEDICATIONS  caspofungin IVPB 50 milliGRAM(s) IV Intermittent every 24 hours  caspofungin IVPB      chlorhexidine 2% Cloths 1 Application(s) Topical <User Schedule>  enoxaparin Injectable 50 milliGRAM(s) SubCutaneous every 12 hours  gabapentin 600 milliGRAM(s) Oral daily  hydrocortisone sodium succinate Injectable 50 milliGRAM(s) IV Push daily  levETIRAcetam 500 milliGRAM(s) Oral two times a day  meropenem  IVPB 1000 milliGRAM(s) IV Intermittent every 8 hours  midodrine 10 milliGRAM(s) Oral every 8 hours  multivitamin 1 Tablet(s) Oral daily  pantoprazole   Suspension 40 milliGRAM(s) Oral two times a day  polyethylene glycol 3350 17 Gram(s) Oral daily  potassium chloride   Powder 20 milliEquivalent(s) Oral once  raloxifene 60 milliGRAM(s) Oral daily    PRN MEDICATIONS  acetaminophen     Tablet .. 650 milliGRAM(s) Oral every 6 hours PRN    VITALS:   T(F): 98.4  HR: 61  BP: 119/57  RR: 19  SpO2: 97%    PHYSICAL EXAM:  GENERAL:   ( x ) NAD, lying in bed comfortably     (  ) obtunded     (  ) lethargic     (  ) somnolent    HEAD:   ( x ) Atraumatic     (  ) hematoma     (  ) laceration (specify location:       )     NECK:  ( x ) Supple     (  ) neck stiffness     (  ) nuchal rigidity     (  )  no JVD     (  ) JVD present ( -- cm)    HEART:  Rate -->     ( x ) normal rate     (  ) bradycardic     (  ) tachycardic  Rhythm -->     ( x ) regular     (  ) regularly irregular     (  ) irregularly irregular  Murmurs -->     (  ) normal s1s2     (  ) systolic murmur     (  ) diastolic murmur     (  ) continuous murmur      (  ) S3 present     (  ) S4 present    LUNGS:   (x )Unlabored respirations     (  ) tachypnea  (x  ) B/L air entry     (  ) decreased breath sounds in:  (location     )    (  ) no adventitious sound     (  ) crackles     (  ) wheezing      (  ) rhonchi      (specify location:       )  (  ) chest wall tenderness (specify location:       )    ABDOMEN:   ( x ) Soft     (  ) tense   |   ( x ) nondistended     (  ) distended   |   (  ) +BS     (  ) hypoactive bowel sounds     (  ) hyperactive bowel sounds  ( x ) nontender     (  ) RUQ tenderness     (  ) RLQ tenderness     (  ) LLQ tenderness     (  ) epigastric tenderness     (  ) diffuse tenderness  (  ) Splenomegaly      (  ) Hepatomegaly      (  ) Jaundice     (  ) ecchymosis     EXTREMITIES:  ( x ) Normal     (  ) Rash     (  ) ecchymosis     (  ) varicose veins      (  ) pitting edema     (  ) non-pitting edema   (  ) ulceration     (  ) gangrene:     (location:     )    NERVOUS SYSTEM:  awake, lethargic, does not follow commands   (  ) A&Ox3     (  ) confused     (  ) lethargic  CN II-XII:     (  ) Intact     (  ) deficits found     (Specify:     )   Upper extremities:     (  ) no sensorimotor deficits     (  ) weakness     (  ) loss of proprioception/vibration     (  ) loss of touch/temperature (specify:    )  Lower extremities:     (  ) no sensorimotor deficits     (  ) weakness     (  ) loss of proprioception/vibration     (  ) loss of touch/temperature (specify:    )    SKIN:   (  ) No rashes or lesions     (  ) maculopapular rash     (  ) pustules     (  ) vesicles     ( x ) ulcer  - right foot   (  ) ecchymosis     (specify location:     )    AMPAC score:    ( x ) Indwelling Madsen Catheter:   Date insterted:    Reason (  ) Critical illness     (x  ) urinary retention    (  ) Accurate Ins/Outs Monitoring     (  ) CMO patient    ( x ) Central Line:   Date inserted:  Location: (x  ) Right IJ  - dc today    (  ) Left IJ     (  ) Right Fem     (  ) Left Fem    (  ) SPC        (  ) pigtail       (  ) PEG tube       (  ) colostomy       (  ) jejunostomy  (  ) U-Dall    LABS:                        11.0   5.62  )-----------( 320      ( 23 Oct 2023 05:20 )             36.0     10-23    143  |  100  |  14  ----------------------------<  184<H>  3.4<L>   |  34<H>  |  0.6<L>    Ca    8.8      23 Oct 2023 05:20  Phos  2.6     10-23  Mg     2.0     10-23    TPro  5.9<L>  /  Alb  2.8<L>  /  TBili  <0.2  /  DBili  x   /  AST  22  /  ALT  17  /  AlkPhos  91  10-23      Urinalysis Basic - ( 23 Oct 2023 05:20 )    Color: x / Appearance: x / SG: x / pH: x  Gluc: 184 mg/dL / Ketone: x  / Bili: x / Urobili: x   Blood: x / Protein: x / Nitrite: x   Leuk Esterase: x / RBC: x / WBC x   Sq Epi: x / Non Sq Epi: x / Bacteria: x      ABG - ( 22 Oct 2023 13:26 )  pH, Arterial: 7.57  pH, Blood: x     /  pCO2: 48    /  pO2: 61    / HCO3: 44    / Base Excess: 20.0  /  SaO2: 91.0                      RADIOLOGY:

## 2023-10-23 NOTE — CHART NOTE - NSCHARTNOTEFT_GEN_A_CORE
Consult received with request for recs for bolus feeds.  Current order is appropriate, but would suggest to provide day time bolus to mimic natural meal pattern- @8AM, 2PM, 8PM.

## 2023-10-23 NOTE — PROGRESS NOTE ADULT - SUBJECTIVE AND OBJECTIVE BOX
JERICHO TEA  58y, Female  Allergy: No Known Allergies      LOS  9d    CHIEF COMPLAINT: Pre-syncope (23 Oct 2023 09:54)      INTERVAL EVENTS/HPI  - No acute events overnight  - T(F): , Max: 98.5 (10-23-23 @ 12:00)  - Denies any worsening symptoms  - Tolerating medication  - WBC Count: 5.62 (10-23-23 @ 05:20)  WBC Count: 7.43 (10-22-23 @ 04:30)     - Creatinine: 0.6 (10-23-23 @ 05:20)  Creatinine: 0.5 (10-22-23 @ 04:30)       ROS  General: Denies rigors, nightsweats  HEENT: Denies headache, rhinorrhea, sore throat, eye pain  CV: Denies CP, palpitations  PULM: Denies wheezing, hemoptysis  GI: Denies hematemesis, hematochezia, melena  : Denies discharge, hematuria  MSK: Denies arthralgias, myalgias  SKIN: Denies rash, lesions  NEURO: Denies paresthesias, weakness  PSYCH: Denies depression, anxiety    VITALS:  T(F): 98.5, Max: 98.5 (10-23-23 @ 12:00)  HR: 105  BP: 105/59  RR: 18Vital Signs Last 24 Hrs  T(C): 36.9 (23 Oct 2023 12:00), Max: 36.9 (23 Oct 2023 08:00)  T(F): 98.5 (23 Oct 2023 12:00), Max: 98.5 (23 Oct 2023 12:00)  HR: 105 (23 Oct 2023 13:00) (45 - 118)  BP: 105/59 (23 Oct 2023 13:00) (77/41 - 137/89)  BP(mean): 76 (23 Oct 2023 13:00) (55 - 108)  RR: 18 (23 Oct 2023 13:00) (13 - 31)  SpO2: 99% (23 Oct 2023 13:00) (85% - 100%)    Parameters below as of 23 Oct 2023 16:00  Patient On (Oxygen Delivery Method): nasal cannula, high flow  O2 Flow (L/min): 60  O2 Concentration (%): 100    PHYSICAL EXAM:  Gen: NAD, resting in bed  HEENT: Normocephalic, atraumatic  Neck: supple, no lymphadenopathy  CV: Regular rate & regular rhythm  Lungs: decreased BS at bases, no fremitus  Abdomen: Soft, BS present  Ext: Warm, well perfused  Neuro: non focal, awake  Skin: no rash, no erythema  Lines: no phlebitis    FH: Non-contributory  Social Hx: Non-contributory    TESTS & MEASUREMENTS:                        11.0   5.62  )-----------( 320      ( 23 Oct 2023 05:20 )             36.0     10-23    143  |  100  |  14  ----------------------------<  184<H>  3.4<L>   |  34<H>  |  0.6<L>    Ca    8.8      23 Oct 2023 05:20  Phos  2.6     10-23  Mg     2.0     10-23    TPro  5.9<L>  /  Alb  2.8<L>  /  TBili  <0.2  /  DBili  x   /  AST  22  /  ALT  17  /  AlkPhos  91  10-23      LIVER FUNCTIONS - ( 23 Oct 2023 05:20 )  Alb: 2.8 g/dL / Pro: 5.9 g/dL / ALK PHOS: 91 U/L / ALT: 17 U/L / AST: 22 U/L / GGT: x           Urinalysis Basic - ( 23 Oct 2023 05:20 )    Color: x / Appearance: x / SG: x / pH: x  Gluc: 184 mg/dL / Ketone: x  / Bili: x / Urobili: x   Blood: x / Protein: x / Nitrite: x   Leuk Esterase: x / RBC: x / WBC x   Sq Epi: x / Non Sq Epi: x / Bacteria: x        Culture - Sputum (collected 10-17-23 @ 19:37)  Source: Trach Asp Tracheal Aspirate  Gram Stain (10-18-23 @ 02:39):    Numerous polymorphonuclear leukocytes per low power field    No Squamous epithelial cells per low power field    Moderate Yeast like cells seen per oil power field  Final Report (10-19-23 @ 21:17):    Normal Respiratory Mili present    Culture - Urine (collected 10-17-23 @ 19:37)  Source: Clean Catch Clean Catch (Midstream)  Final Report (10-19-23 @ 00:23):    No growth    Culture - Blood (collected 10-17-23 @ 12:35)  Source: .Blood Blood  Final Report (10-22-23 @ 22:00):    No growth at 5 days    Culture - Sputum (collected 10-04-23 @ 12:00)  Source: Trach Asp Tracheal Aspirate  Gram Stain (10-04-23 @ 22:58):    Few polymorphonuclear leukocytes per low power field    Rare Squamous epithelial cells per low power field    No organisms seen per oil power field  Final Report (10-06-23 @ 10:31):    No growth    Culture - Sputum (collected 10-02-23 @ 11:10)  Source: Trach Asp Tracheal Aspirate  Gram Stain (10-03-23 @ 07:33):    Moderate polymorphonuclear leukocytes per low power field    Rare Squamous epithelial cells per low power field    Few Gram positive cocci in pairs seen per oil power field    Rare Gram Positive Rods seen per oil power field    Rare Yeast like cells seen per oil power field  Final Report (10-04-23 @ 17:12):    No growth at 48 hours    Culture - Urine (collected 09-29-23 @ 10:10)  Source: Clean Catch Clean Catch (Midstream)  Final Report (10-01-23 @ 01:13):    <10,000 CFU/mL Normal Urogenital Mili    Culture - Blood (collected 09-29-23 @ 09:47)  Source: .Blood Blood-Peripheral  Final Report (10-04-23 @ 17:00):    No growth at 5 days    Culture - Blood (collected 09-29-23 @ 09:47)  Source: .Blood Blood-Peripheral  Gram Stain (10-01-23 @ 09:15):    Growth in aerobic bottle: Gram Positive Rods and Gram positive cocci in    pairs    Growth in anaerobic bottle: Gram positive cocci in pairs  Final Report (10-05-23 @ 22:52):    Growth in aerobic bottle: Corynebacterium amycolatum "Susceptibilities    not performed"    Growth in anaerobic bottle: Peptoniphilus harei group "Susceptibilities    not performed"    Growth in aerobic bottle: Gram positive cocci in pairs    Organism seen in Gram stain is non-viable after prolonged    incubation and repeated subculture.    Direct identification is available within approximately 3-5    hours either by Blood Panel Multiplexed PCR or Direct    MALDI-TOF. Details: [email or web address]  Organism: Blood Culture PCR (10-05-23 @ 22:52)  Organism: Blood Culture PCR (10-05-23 @ 22:52)      Method Type: PCR      -  Blood PCR Panel: NEG            INFECTIOUS DISEASES TESTING  MRSA PCR Result.: Negative (10-17-23 @ 17:20)  Fungitell: >500 (10-17-23 @ 17:20)  Procalcitonin, Serum: 4.36 (10-17-23 @ 17:20)  Procalcitonin, Serum: 4.18 (10-17-23 @ 12:35)  Procalcitonin, Serum: 0.07 (10-15-23 @ 07:28)  COVID-19 PCR: NotDetec (10-12-23 @ 09:14)  Procalcitonin, Serum: 0.40 (10-07-23 @ 10:54)  Legionella Antigen, Urine: Negative (09-30-23 @ 14:36)  MRSA PCR Result.: Negative (09-29-23 @ 16:50)  Procalcitonin, Serum: 9.78 (08-12-23 @ 11:25)  MRSA PCR Result.: Negative (08-12-23 @ 06:10)  Rapid RVP Result: NotDetec (08-12-23 @ 01:33)  Procalcitonin, Serum: 0.63 (08-10-23 @ 08:46)  Procalcitonin, Serum: 7.82 (08-04-23 @ 16:13)  MRSA PCR Result.: Negative (08-04-23 @ 14:52)  Rapid RVP Result: NotDetec (08-04-23 @ 03:00)      INFLAMMATORY MARKERS  Sedimentation Rate, Erythrocyte: 67 mm/Hr (10-15-23 @ 07:28)  C-Reactive Protein, Serum: 16.1 mg/L (10-15-23 @ 07:28)      RADIOLOGY & ADDITIONAL TESTS:  I have personally reviewed the last available Chest xray  CXR  Xray Chest 1 View- PORTABLE-Urgent:   ACC: [id number] EXAM:  XR CHEST PORTABLE URGENT 1V   ORDERED BY: CASIMIRO VELÁSQUEZ     PROCEDURE DATE:  10/21/2023          INTERPRETATION:  Clinical History / Reason for exam: Nasogastric tube   placement    Comparison : Chest radiograph earlier the day.    Technique/Positioning: Frontal, portable.    Findings:    Support devices: Right IJ central line is stable. Nasogastric tube is   seen with the tip overlying left upper quadrant. Telemetry leads and   tubing overlie patient..    Cardiac/mediastinum/hilum: Stable    Lung parenchyma/Pleura: Increasing bilateral lung opacities..    Skeleton/soft tissues: Stable..    Impression:    Increasing bilateral lung opacities        --- End of Report ---            TOÑITO MORENO MD; Attending Radiologist  This document has been electronically signed. Oct 22 2023  9:17AM (10-21-23 @ 18:28)      CT      CARDIOLOGY TESTING  12 Lead ECG:   Ventricular Rate 115 BPM    Atrial Rate 115 BPM    P-R Interval 130 ms    QRS Duration 68 ms    Q-T Interval 348 ms    QTC Calculation(Bazett) 481 ms    P Axis 43 degrees    R Axis 99 degrees    T Axis 22 degrees    Diagnosis Line Sinus tachycardia  Rightward axis  Otherwise normal ECG    Confirmed by Gordo Mckee ([id number]) on 10/16/2023 12:41:36 PM (10-14-23 @ 16:43)  12 Lead ECG:   Ventricular Rate 152 BPM    Atrial Rate 107 BPM    P-R Interval 98 ms    QRS Duration 68 ms    Q-T Interval 320 ms    QTC Calculation(Bazett) 508 ms    P Axis 21 degrees    R Axis 86 degrees    T Axis 29 degrees    Diagnosis Line Normal sinus rhythm  Cannot rule out Anterior infarct , age undetermined  Abnormal ECG  *** Poor data quality, interpretation may be adversely affected  Confirmed by Amol Lopez ([id number]) on 10/15/2023 2:06:47 PM (10-14-23 @ 16:42)      MEDICATIONS  caspofungin IVPB 50 IV Intermittent every 24 hours  caspofungin IVPB     chlorhexidine 2% Cloths 1 Topical <User Schedule>  enoxaparin Injectable 50 SubCutaneous every 12 hours  gabapentin 600 Oral daily  hydrocortisone sodium succinate Injectable 50 IV Push daily  levETIRAcetam 500 Oral two times a day  meropenem  IVPB 1000 IV Intermittent every 8 hours  midodrine 10 Oral every 8 hours  multivitamin 1 Oral daily  pantoprazole   Suspension 40 Oral two times a day  polyethylene glycol 3350 17 Oral daily  raloxifene 60 Oral daily      WEIGHT  Weight (kg): 60.6 (10-17-23 @ 19:00)  Creatinine: 0.6 mg/dL (10-23-23 @ 05:20)      ANTIBIOTICS:  caspofungin IVPB 50 milliGRAM(s) IV Intermittent every 24 hours  caspofungin IVPB      meropenem  IVPB 1000 milliGRAM(s) IV Intermittent every 8 hours      All available historical records have been reviewed

## 2023-10-23 NOTE — SWALLOW BEDSIDE ASSESSMENT ADULT - SLP PERTINENT HISTORY OF CURRENT PROBLEM
Pt w/ hx significant for Down syndrome, nonverbal at baseline, cerebral palsy, seizures, neuropathy, congenital pulmonary stenosis presented for upper body shaking and syncope episode w/ bradycardia and severe hypotension.  Per neurology note: "VEEG showed right posterior quadrant sharp waves that are probably epileptiform and sharp transients."  Pt is s/p rapid response on 10/17 w/ subsequent intubation 2' AHRF.  CXR 10/17: "Worsening pneumonia, left lower lung." Pt extubated 10/21

## 2023-10-23 NOTE — SWALLOW BEDSIDE ASSESSMENT ADULT - SWALLOW EVAL: FUNCTIONAL LEVEL AT TIME OF EVAL
Awake, nonverbal
Awake, nonverbal
awake, nonverbal, making eye contact at times, intermittent wet cough indicating impaired secretion management, pulse ox readings in low 90s dropping at times to high 80s

## 2023-10-23 NOTE — PROGRESS NOTE ADULT - ASSESSMENT
Patient is a 58 year old female with a PMHx of Down syndrome, nonverbal at baseline, hypothyroidism, cerebral palsy, congenital pulmonary stenosis, seizures, presents to the ED from nursing facility for syncope associated with tonic-clonic movement witnessed by aide, vitals showed hypoxia and bradycardia for about 10-15 minutes, found to have multilobar pneumonia. Pt with recent admission to ICU for UGIB, found to have duodenal perforation, also found to have PNA, admitted to ICU for septic and hypovolemic shock requiring intubation and pressors.     #Acute hypoxemic respiratory failure s/p intubation  #Aspiration pneumonia   #DVT left common fem on full AC   -Pt being treated for multilobar pna this admission. Possible aspiration event on 10/17 AM, RR called for desats, no improvement on nonrebreather>HFNC>anesthesia intubated patient on floor. R IJ placed, patient started on levo and precedex.   -Pt now of pressors   -midodrine 10 q8  -CXR - Bilateral opacities, improving on left.  -Duplex 10/17 shows DVT in Left common fem - pt on Lovenox 50mg bid  -CT angio 10/18 - No CT evidence of pulmonary embolism. Bilateral pneumonia.  -Hb stable  -bowel regimen, pt having BM   -Fungitell positive >500  -ID following, on meropenem and caspofungin   -OVN 10/22 patient desatting, placed on HFNC now satting well  -wean O2  -decrease hydrocortisone to 50mg IV qd     #H/o recent duodenal perforation  -pt on GI ppx  -Pt on therapeutic lovenox for DVT  -Hb stable     #Osteomyelitis, right foot   -Patient with multiple decubitus ulcers on her right foot, less on left foot.   -Xray right foot showed post transmetatarsal amputation of the first ray with periosteal reaction at the amputation stump, suspicious for recurrent osteomyelitis. There is second toe ulcer with osseous erosion/destruction at the second distal phalangeal tuft, consistent with osteomyelitis. There is dorsal   -MR right foot - 1.  Limited exam. 2.  Osteomyelitis of the first metatarsal stump. 3.  Osteomyelitis of the second toe distal phalanx.  -podiatry following, OR for debridement - was scheduled for Friday 10/20 - cancelled and will reassess for surgery next week.    #H/o seizures, not on AED  -On day of admission pt had tonic-clonic movements witnessed by aide  -neuro consulted - Episode suspicious for cardiac event leading to drop in BP and HR. Unclear if epileptic seizure.  -EEG showed generalized slowing, no seizure activity   -OVN 10/17 RN noted another episode, lasted around 30sec with whole body rhythmic shaking, left arm jerking, abnormal eye movements. 2mg ativan given.   -vEEG showed generalized slowing, diffusely expressed triphasic waves, right posterior quadrant sharp waves that are probably epileptiform and sharp transients, small number of stimulus-dependent posterior periodic dischargees.   -Per neuro patient started on Keppra 500mg bid.     #HO polymicrobial bacteremia   #H/o CP    Misc:  Diet - npo, tube feeds   Activity - bedrest  DVT ppx - therapeutic lovenox   Dispo - MICU for now     Lines - R IJ 10/17 - dc today, Tena Cheng - none      Patient is a 58 year old female with a PMHx of Down syndrome, nonverbal at baseline, hypothyroidism, cerebral palsy, congenital pulmonary stenosis, seizures, presents to the ED from nursing facility for syncope associated with tonic-clonic movement witnessed by aide, vitals showed hypoxia and bradycardia for about 10-15 minutes, found to have multilobar pneumonia. Pt with recent admission to ICU for UGIB, found to have duodenal perforation, also found to have PNA, admitted to ICU for septic and hypovolemic shock requiring intubation and pressors.     #Acute hypoxemic respiratory failure s/p intubation  #Aspiration pneumonia   #DVT left common fem on full AC   -Pt being treated for multilobar pna this admission. Possible aspiration event on 10/17 AM, RR called for desats, no improvement on nonrebreather>HFNC>anesthesia intubated patient on floor. R IJ placed, patient started on levo and precedex.   -Pt now of pressors   -midodrine 10 q8  -CXR - Bilateral opacities, improving on left.  -Duplex 10/17 shows DVT in Left common fem - pt on Lovenox 50mg bid  -CT angio 10/18 - No CT evidence of pulmonary embolism. Bilateral pneumonia.  -Hb stable  -bowel regimen, pt having BM   -Fungitell positive >500  -ID following, on meropenem and caspofungin   -OVN 10/22 patient desatting, placed on HFNC now satting well  -wean O2  -decrease hydrocortisone to 50mg IV qd     #H/o recent duodenal perforation  -pt on GI ppx  -Pt on therapeutic lovenox for DVT  -Hb stable     #Osteomyelitis, right foot   -Patient with multiple decubitus ulcers on her right foot, less on left foot.   -Xray right foot showed post transmetatarsal amputation of the first ray with periosteal reaction at the amputation stump, suspicious for recurrent osteomyelitis. There is second toe ulcer with osseous erosion/destruction at the second distal phalangeal tuft, consistent with osteomyelitis. There is dorsal   -MR right foot - 1.  Limited exam. 2.  Osteomyelitis of the first metatarsal stump. 3.  Osteomyelitis of the second toe distal phalanx.  -podiatry following, OR for debridement - was scheduled for Friday 10/20 - cancelled and will reassess for surgery next week.    #H/o seizures, not on AED  -On day of admission pt had tonic-clonic movements witnessed by aide  -neuro consulted - Episode suspicious for cardiac event leading to drop in BP and HR. Unclear if epileptic seizure.  -EEG showed generalized slowing, no seizure activity   -OVN 10/17 RN noted another episode, lasted around 30sec with whole body rhythmic shaking, left arm jerking, abnormal eye movements. 2mg ativan given.   -vEEG showed generalized slowing, diffusely expressed triphasic waves, right posterior quadrant sharp waves that are probably epileptiform and sharp transients, small number of stimulus-dependent posterior periodic dischargees.   -Per neuro patient started on Keppra 500mg bid.     #HO polymicrobial bacteremia   #H/o CP    Misc:  Diet - npo, tube feeds   Activity - bedrest  DVT ppx - therapeutic lovenox   Dispo - MICU for now     Lines - R IJ 10/17 - dc today, peripheral IVs, Madsen   Drips - none

## 2023-10-23 NOTE — PROGRESS NOTE ADULT - SUBJECTIVE AND OBJECTIVE BOX
Patient is a 58y old  Female who presents with a chief complaint of Pre-syncope (22 Oct 2023 08:33)        Over Night Events:  Now on HFNCO2.  Off pressors.          ROS:     All ROS are negative except HPI         PHYSICAL EXAM    ICU Vital Signs Last 24 Hrs  T(C): 36.9 (23 Oct 2023 08:00), Max: 36.9 (23 Oct 2023 08:00)  T(F): 98.4 (23 Oct 2023 08:00), Max: 98.4 (23 Oct 2023 08:00)  HR: 61 (23 Oct 2023 08:00) (45 - 118)  BP: 119/57 (23 Oct 2023 08:00) (77/41 - 137/89)  BP(mean): 82 (23 Oct 2023 08:00) (55 - 108)  ABP: --  ABP(mean): --  RR: 19 (23 Oct 2023 08:00) (12 - 31)  SpO2: 97% (23 Oct 2023 08:00) (85% - 100%)    O2 Parameters below as of 23 Oct 2023 08:00  Patient On (Oxygen Delivery Method): nasal cannula, high flow  O2 Flow (L/min): 50  O2 Concentration (%): 50        CONSTITUTIONAL:  In NAD    ENT:   Airway patent,   Mouth with normal mucosa.     EYES:   Pupils equal,   Round and reactive to light.    CARDIAC:   Normal rate,   Regular rhythm.      RESPIRATORY:   No wheezing  Bilateral crackles   Normal chest expansion  Not tachypneic,  No use of accessory muscles    GASTROINTESTINAL:  Abdomen soft,   Non-tender,   No guarding,   + BS    MUSCULOSKELETAL:   Range of motion is limited,  No clubbing, cyanosis    NEUROLOGICAL:   Does not follow commands     SKIN:   Skin normal color for race,   No evidence of rash.            10-22-23 @ 07:01  -  10-23-23 @ 07:00  --------------------------------------------------------  IN:    Enteral Tube Flush: 150 mL    IV PiggyBack: 450 mL    Lactated Ringers Bolus: 1000 mL    Miscellaneous Tube Feedin mL  Total IN: 2320 mL    OUT:    Indwelling Catheter - Urethral (mL): 1460 mL    Norepinephrine: 0 mL  Total OUT: 1460 mL    Total NET: 860 mL          LABS:                            11.0   5.62  )-----------( 320      ( 23 Oct 2023 05:20 )             36.0                                               10-23    143  |  100  |  14  ----------------------------<  184<H>  3.4<L>   |  34<H>  |  0.6<L>    Ca    8.8      23 Oct 2023 05:20  Phos  2.6     10-23  Mg     2.0     10-23    TPro  5.9<L>  /  Alb  2.8<L>  /  TBili  <0.2  /  DBili  x   /  AST  22  /  ALT  17  /  AlkPhos  91  10-23                                             Urinalysis Basic - ( 23 Oct 2023 05:20 )    Color: x / Appearance: x / SG: x / pH: x  Gluc: 184 mg/dL / Ketone: x  / Bili: x / Urobili: x   Blood: x / Protein: x / Nitrite: x   Leuk Esterase: x / RBC: x / WBC x   Sq Epi: x / Non Sq Epi: x / Bacteria: x                                                  LIVER FUNCTIONS - ( 23 Oct 2023 05:20 )  Alb: 2.8 g/dL / Pro: 5.9 g/dL / ALK PHOS: 91 U/L / ALT: 17 U/L / AST: 22 U/L / GGT: x                                                                                                                                   ABG - ( 22 Oct 2023 13:26 )  pH, Arterial: 7.57  pH, Blood: x     /  pCO2: 48    /  pO2: 61    / HCO3: 44    / Base Excess: 20.0  /  SaO2: 91.0                MEDICATIONS  (STANDING):  caspofungin IVPB 50 milliGRAM(s) IV Intermittent every 24 hours  caspofungin IVPB      chlorhexidine 2% Cloths 1 Application(s) Topical <User Schedule>  enoxaparin Injectable 50 milliGRAM(s) SubCutaneous every 12 hours  gabapentin 600 milliGRAM(s) Oral daily  hydrocortisone sodium succinate Injectable 50 milliGRAM(s) IV Push every 12 hours  levETIRAcetam 500 milliGRAM(s) Oral two times a day  meropenem  IVPB 1000 milliGRAM(s) IV Intermittent every 8 hours  midodrine 10 milliGRAM(s) Oral every 8 hours  multivitamin 1 Tablet(s) Oral daily  norepinephrine Infusion 0.05 MICROgram(s)/kG/Min (5.68 mL/Hr) IV Continuous <Continuous>  pantoprazole   Suspension 40 milliGRAM(s) Oral two times a day  polyethylene glycol 3350 17 Gram(s) Oral daily  raloxifene 60 milliGRAM(s) Oral daily    MEDICATIONS  (PRN):  acetaminophen     Tablet .. 650 milliGRAM(s) Oral every 6 hours PRN Temp greater or equal to 38C (100.4F), Mild Pain (1 - 3)      New X-rays reviewed:                                                                                  ECHO

## 2023-10-24 LAB
ALBUMIN SERPL ELPH-MCNC: 2.9 G/DL — LOW (ref 3.5–5.2)
ALBUMIN SERPL ELPH-MCNC: 2.9 G/DL — LOW (ref 3.5–5.2)
ALP SERPL-CCNC: 88 U/L — SIGNIFICANT CHANGE UP (ref 30–115)
ALP SERPL-CCNC: 88 U/L — SIGNIFICANT CHANGE UP (ref 30–115)
ALT FLD-CCNC: 21 U/L — SIGNIFICANT CHANGE UP (ref 0–41)
ALT FLD-CCNC: 21 U/L — SIGNIFICANT CHANGE UP (ref 0–41)
ANION GAP SERPL CALC-SCNC: 7 MMOL/L — SIGNIFICANT CHANGE UP (ref 7–14)
ANION GAP SERPL CALC-SCNC: 7 MMOL/L — SIGNIFICANT CHANGE UP (ref 7–14)
AST SERPL-CCNC: 28 U/L — SIGNIFICANT CHANGE UP (ref 0–41)
AST SERPL-CCNC: 28 U/L — SIGNIFICANT CHANGE UP (ref 0–41)
BASOPHILS # BLD AUTO: 0.04 K/UL — SIGNIFICANT CHANGE UP (ref 0–0.2)
BASOPHILS # BLD AUTO: 0.04 K/UL — SIGNIFICANT CHANGE UP (ref 0–0.2)
BASOPHILS NFR BLD AUTO: 0.7 % — SIGNIFICANT CHANGE UP (ref 0–1)
BASOPHILS NFR BLD AUTO: 0.7 % — SIGNIFICANT CHANGE UP (ref 0–1)
BILIRUB SERPL-MCNC: <0.2 MG/DL — SIGNIFICANT CHANGE UP (ref 0.2–1.2)
BILIRUB SERPL-MCNC: <0.2 MG/DL — SIGNIFICANT CHANGE UP (ref 0.2–1.2)
BUN SERPL-MCNC: 16 MG/DL — SIGNIFICANT CHANGE UP (ref 10–20)
BUN SERPL-MCNC: 16 MG/DL — SIGNIFICANT CHANGE UP (ref 10–20)
CALCIUM SERPL-MCNC: 8.6 MG/DL — SIGNIFICANT CHANGE UP (ref 8.4–10.5)
CALCIUM SERPL-MCNC: 8.6 MG/DL — SIGNIFICANT CHANGE UP (ref 8.4–10.5)
CHLORIDE SERPL-SCNC: 99 MMOL/L — SIGNIFICANT CHANGE UP (ref 98–110)
CHLORIDE SERPL-SCNC: 99 MMOL/L — SIGNIFICANT CHANGE UP (ref 98–110)
CHLORIDE UR-SCNC: 68 — SIGNIFICANT CHANGE UP
CHLORIDE UR-SCNC: 68 — SIGNIFICANT CHANGE UP
CO2 SERPL-SCNC: 35 MMOL/L — HIGH (ref 17–32)
CO2 SERPL-SCNC: 35 MMOL/L — HIGH (ref 17–32)
CREAT SERPL-MCNC: 0.6 MG/DL — LOW (ref 0.7–1.5)
CREAT SERPL-MCNC: 0.6 MG/DL — LOW (ref 0.7–1.5)
EGFR: 105 ML/MIN/1.73M2 — SIGNIFICANT CHANGE UP
EGFR: 105 ML/MIN/1.73M2 — SIGNIFICANT CHANGE UP
EOSINOPHIL # BLD AUTO: 0.16 K/UL — SIGNIFICANT CHANGE UP (ref 0–0.7)
EOSINOPHIL # BLD AUTO: 0.16 K/UL — SIGNIFICANT CHANGE UP (ref 0–0.7)
EOSINOPHIL NFR BLD AUTO: 2.9 % — SIGNIFICANT CHANGE UP (ref 0–8)
EOSINOPHIL NFR BLD AUTO: 2.9 % — SIGNIFICANT CHANGE UP (ref 0–8)
GAS PNL BLDA: SIGNIFICANT CHANGE UP
GAS PNL BLDA: SIGNIFICANT CHANGE UP
GLUCOSE SERPL-MCNC: 145 MG/DL — HIGH (ref 70–99)
GLUCOSE SERPL-MCNC: 145 MG/DL — HIGH (ref 70–99)
HCT VFR BLD CALC: 37.6 % — SIGNIFICANT CHANGE UP (ref 37–47)
HCT VFR BLD CALC: 37.6 % — SIGNIFICANT CHANGE UP (ref 37–47)
HGB BLD-MCNC: 11.5 G/DL — LOW (ref 12–16)
HGB BLD-MCNC: 11.5 G/DL — LOW (ref 12–16)
IMM GRANULOCYTES NFR BLD AUTO: 1.6 % — HIGH (ref 0.1–0.3)
IMM GRANULOCYTES NFR BLD AUTO: 1.6 % — HIGH (ref 0.1–0.3)
LYMPHOCYTES # BLD AUTO: 1.86 K/UL — SIGNIFICANT CHANGE UP (ref 1.2–3.4)
LYMPHOCYTES # BLD AUTO: 1.86 K/UL — SIGNIFICANT CHANGE UP (ref 1.2–3.4)
LYMPHOCYTES # BLD AUTO: 33.6 % — SIGNIFICANT CHANGE UP (ref 20.5–51.1)
LYMPHOCYTES # BLD AUTO: 33.6 % — SIGNIFICANT CHANGE UP (ref 20.5–51.1)
MAGNESIUM SERPL-MCNC: 2.1 MG/DL — SIGNIFICANT CHANGE UP (ref 1.8–2.4)
MAGNESIUM SERPL-MCNC: 2.1 MG/DL — SIGNIFICANT CHANGE UP (ref 1.8–2.4)
MCHC RBC-ENTMCNC: 24 PG — LOW (ref 27–31)
MCHC RBC-ENTMCNC: 24 PG — LOW (ref 27–31)
MCHC RBC-ENTMCNC: 30.6 G/DL — LOW (ref 32–37)
MCHC RBC-ENTMCNC: 30.6 G/DL — LOW (ref 32–37)
MCV RBC AUTO: 78.3 FL — LOW (ref 81–99)
MCV RBC AUTO: 78.3 FL — LOW (ref 81–99)
MONOCYTES # BLD AUTO: 0.4 K/UL — SIGNIFICANT CHANGE UP (ref 0.1–0.6)
MONOCYTES # BLD AUTO: 0.4 K/UL — SIGNIFICANT CHANGE UP (ref 0.1–0.6)
MONOCYTES NFR BLD AUTO: 7.2 % — SIGNIFICANT CHANGE UP (ref 1.7–9.3)
MONOCYTES NFR BLD AUTO: 7.2 % — SIGNIFICANT CHANGE UP (ref 1.7–9.3)
NEUTROPHILS # BLD AUTO: 2.99 K/UL — SIGNIFICANT CHANGE UP (ref 1.4–6.5)
NEUTROPHILS # BLD AUTO: 2.99 K/UL — SIGNIFICANT CHANGE UP (ref 1.4–6.5)
NEUTROPHILS NFR BLD AUTO: 54 % — SIGNIFICANT CHANGE UP (ref 42.2–75.2)
NEUTROPHILS NFR BLD AUTO: 54 % — SIGNIFICANT CHANGE UP (ref 42.2–75.2)
NRBC # BLD: 0 /100 WBCS — SIGNIFICANT CHANGE UP (ref 0–0)
NRBC # BLD: 0 /100 WBCS — SIGNIFICANT CHANGE UP (ref 0–0)
PHOSPHATE SERPL-MCNC: 2.3 MG/DL — SIGNIFICANT CHANGE UP (ref 2.1–4.9)
PHOSPHATE SERPL-MCNC: 2.3 MG/DL — SIGNIFICANT CHANGE UP (ref 2.1–4.9)
PLATELET # BLD AUTO: 334 K/UL — SIGNIFICANT CHANGE UP (ref 130–400)
PLATELET # BLD AUTO: 334 K/UL — SIGNIFICANT CHANGE UP (ref 130–400)
PMV BLD: 10.1 FL — SIGNIFICANT CHANGE UP (ref 7.4–10.4)
PMV BLD: 10.1 FL — SIGNIFICANT CHANGE UP (ref 7.4–10.4)
POTASSIUM SERPL-MCNC: 3.4 MMOL/L — LOW (ref 3.5–5)
POTASSIUM SERPL-MCNC: 3.4 MMOL/L — LOW (ref 3.5–5)
POTASSIUM SERPL-SCNC: 3.4 MMOL/L — LOW (ref 3.5–5)
POTASSIUM SERPL-SCNC: 3.4 MMOL/L — LOW (ref 3.5–5)
PROT SERPL-MCNC: 6 G/DL — SIGNIFICANT CHANGE UP (ref 6–8)
PROT SERPL-MCNC: 6 G/DL — SIGNIFICANT CHANGE UP (ref 6–8)
RBC # BLD: 4.8 M/UL — SIGNIFICANT CHANGE UP (ref 4.2–5.4)
RBC # BLD: 4.8 M/UL — SIGNIFICANT CHANGE UP (ref 4.2–5.4)
RBC # FLD: 26.6 % — HIGH (ref 11.5–14.5)
RBC # FLD: 26.6 % — HIGH (ref 11.5–14.5)
SODIUM SERPL-SCNC: 141 MMOL/L — SIGNIFICANT CHANGE UP (ref 135–146)
SODIUM SERPL-SCNC: 141 MMOL/L — SIGNIFICANT CHANGE UP (ref 135–146)
WBC # BLD: 5.54 K/UL — SIGNIFICANT CHANGE UP (ref 4.8–10.8)
WBC # BLD: 5.54 K/UL — SIGNIFICANT CHANGE UP (ref 4.8–10.8)
WBC # FLD AUTO: 5.54 K/UL — SIGNIFICANT CHANGE UP (ref 4.8–10.8)
WBC # FLD AUTO: 5.54 K/UL — SIGNIFICANT CHANGE UP (ref 4.8–10.8)

## 2023-10-24 PROCEDURE — 71045 X-RAY EXAM CHEST 1 VIEW: CPT | Mod: 26

## 2023-10-24 PROCEDURE — 99291 CRITICAL CARE FIRST HOUR: CPT

## 2023-10-24 RX ORDER — FUROSEMIDE 40 MG
20 TABLET ORAL ONCE
Refills: 0 | Status: COMPLETED | OUTPATIENT
Start: 2023-10-24 | End: 2023-10-24

## 2023-10-24 RX ORDER — LEVETIRACETAM 250 MG/1
500 TABLET, FILM COATED ORAL
Refills: 0 | Status: DISCONTINUED | OUTPATIENT
Start: 2023-10-24 | End: 2023-10-25

## 2023-10-24 RX ORDER — SODIUM CHLORIDE 9 MG/ML
1000 INJECTION INTRAMUSCULAR; INTRAVENOUS; SUBCUTANEOUS
Refills: 0 | Status: DISCONTINUED | OUTPATIENT
Start: 2023-10-24 | End: 2023-10-25

## 2023-10-24 RX ORDER — POTASSIUM CHLORIDE 20 MEQ
20 PACKET (EA) ORAL
Refills: 0 | Status: COMPLETED | OUTPATIENT
Start: 2023-10-24 | End: 2023-10-24

## 2023-10-24 RX ORDER — SODIUM CHLORIDE 9 MG/ML
250 INJECTION INTRAMUSCULAR; INTRAVENOUS; SUBCUTANEOUS ONCE
Refills: 0 | Status: COMPLETED | OUTPATIENT
Start: 2023-10-24 | End: 2023-10-24

## 2023-10-24 RX ORDER — POTASSIUM CHLORIDE 20 MEQ
20 PACKET (EA) ORAL ONCE
Refills: 0 | Status: DISCONTINUED | OUTPATIENT
Start: 2023-10-24 | End: 2023-10-24

## 2023-10-24 RX ADMIN — Medication 50 MILLIGRAM(S): at 05:23

## 2023-10-24 RX ADMIN — LEVETIRACETAM 500 MILLIGRAM(S): 250 TABLET, FILM COATED ORAL at 17:04

## 2023-10-24 RX ADMIN — MIDODRINE HYDROCHLORIDE 10 MILLIGRAM(S): 2.5 TABLET ORAL at 05:05

## 2023-10-24 RX ADMIN — SODIUM CHLORIDE 50 MILLILITER(S): 9 INJECTION INTRAMUSCULAR; INTRAVENOUS; SUBCUTANEOUS at 08:59

## 2023-10-24 RX ADMIN — MEROPENEM 100 MILLIGRAM(S): 1 INJECTION INTRAVENOUS at 01:30

## 2023-10-24 RX ADMIN — RALOXIFENE HYDROCHLORIDE 60 MILLIGRAM(S): 60 TABLET, COATED ORAL at 11:40

## 2023-10-24 RX ADMIN — Medication 1 TABLET(S): at 11:39

## 2023-10-24 RX ADMIN — SODIUM CHLORIDE 250 MILLILITER(S): 9 INJECTION INTRAMUSCULAR; INTRAVENOUS; SUBCUTANEOUS at 02:00

## 2023-10-24 RX ADMIN — LEVETIRACETAM 500 MILLIGRAM(S): 250 TABLET, FILM COATED ORAL at 05:05

## 2023-10-24 RX ADMIN — SODIUM CHLORIDE 1500 MILLILITER(S): 9 INJECTION INTRAMUSCULAR; INTRAVENOUS; SUBCUTANEOUS at 06:29

## 2023-10-24 RX ADMIN — ENOXAPARIN SODIUM 50 MILLIGRAM(S): 100 INJECTION SUBCUTANEOUS at 05:04

## 2023-10-24 RX ADMIN — SODIUM CHLORIDE 500 MILLILITER(S): 9 INJECTION INTRAMUSCULAR; INTRAVENOUS; SUBCUTANEOUS at 08:59

## 2023-10-24 RX ADMIN — MIDODRINE HYDROCHLORIDE 10 MILLIGRAM(S): 2.5 TABLET ORAL at 21:12

## 2023-10-24 RX ADMIN — MIDODRINE HYDROCHLORIDE 10 MILLIGRAM(S): 2.5 TABLET ORAL at 14:40

## 2023-10-24 RX ADMIN — GABAPENTIN 600 MILLIGRAM(S): 400 CAPSULE ORAL at 11:39

## 2023-10-24 RX ADMIN — CHLORHEXIDINE GLUCONATE 1 APPLICATION(S): 213 SOLUTION TOPICAL at 05:05

## 2023-10-24 RX ADMIN — Medication 20 MILLIGRAM(S): at 04:13

## 2023-10-24 RX ADMIN — PANTOPRAZOLE SODIUM 40 MILLIGRAM(S): 20 TABLET, DELAYED RELEASE ORAL at 05:04

## 2023-10-24 RX ADMIN — PANTOPRAZOLE SODIUM 40 MILLIGRAM(S): 20 TABLET, DELAYED RELEASE ORAL at 17:05

## 2023-10-24 RX ADMIN — SODIUM CHLORIDE 250 MILLILITER(S): 9 INJECTION INTRAMUSCULAR; INTRAVENOUS; SUBCUTANEOUS at 01:30

## 2023-10-24 RX ADMIN — Medication 50 MILLIEQUIVALENT(S): at 10:29

## 2023-10-24 RX ADMIN — ENOXAPARIN SODIUM 50 MILLIGRAM(S): 100 INJECTION SUBCUTANEOUS at 17:05

## 2023-10-24 RX ADMIN — Medication 50 MILLIEQUIVALENT(S): at 08:14

## 2023-10-24 RX ADMIN — CASPOFUNGIN ACETATE 260 MILLIGRAM(S): 7 INJECTION, POWDER, LYOPHILIZED, FOR SOLUTION INTRAVENOUS at 10:52

## 2023-10-24 NOTE — SWALLOW FEES ASSESSMENT ADULT - PRELIMINARY ENDOSCOPIC EXAMINATIONS
Interarytenoid/post-commissure edema/Interarytenoid/Arytenoid edema/Interarytenoid/Arytenoid erythema/Baseline penetration of secretions

## 2023-10-24 NOTE — CHART NOTE - NSCHARTNOTEFT_GEN_A_CORE
Transfer from: ICU  Transfer to: SDU    HPI:  Patient is a 58 year old female with a PMHx of Down syndrome, nonverbal at baseline, hypothyroidism, cerebral palsy, congenital pulmonary stenosis, Seizures, presents to the ED from nursing facility for syncope associated with tonic-clonic movement witnessed by aide. Initial vitals at nursing home showed bradycardia and hypoxia for about 10-15 minutes. No fevers, chill, cough, vomiting, diarrhea, difficulty breathing.     Hospital course 9/29/2023 -> 10/13/2023 Patient was admitted  for hemoptysis and duodenal perforation requiring intubation and ICU admission for hypovolemic and septic shock requiring temporary pressor support, which resolved. Patient also had acute hypoxemic respiratory failure likely due to multifocal pneumonia. Patient was successfully extubated and completed a course of antibiotics per ID, further infectious workup was unremarkable. Repeat CTAP with contrast showed no contrast extravasation and previous seen foci of retroperitoneal gas no longer identified. The patient was evaluated by surgery and GI, and no acute intervention recommended. Patient with stable hemoglobin. Hospital course complicated by NELA likely prerenal which resolved and NSTEMI likely type II due to shock (Echo 9/30 EF 55 to 60%, Normal left systolic function Diastolic function could not be assessed) with subsequent downtrending trops (0.34 --> 0.11). Patient's hospital course ICU --> Stepdown --> General medical floor. Patient remains hemodynamically stable on general medical, tolerating pureed feeds, and has bowel movements. Patient was discharged on 10/13/2023     Arrive to ED 10/14/2023 from nursing facility for syncope associated with tonic-clonic movement witnessed by aide. Initial vitals at nursing home showed bradycardia and hypoxia for about 10-15 minutes. No fevers, chill, cough, vomiting, diarrhea, difficulty breathing.      - Vital signs: BP: 86/55, HR: 77, RR: 24, SpO2: 98% on 4L NC  - Labs showed troponin: 0,02 ( was 0.34 and then 0,11 2 weeks ago), Creatinine 1.1 ( 0.7 on 10/13)   - Head CT negative for acute pathology  - rEEG: generalized slowing, no seizure activity  - Echo results 9/30 EF 55 to 60 %, Normal left systolic function Diastolic function could not be assessed.   - Now off tele (recent NSTEMI)  - Neuro recs: continue Gabapentin 600mg PO QD F/u outpatient  - zosyn started (10/17) for LLL PNA and infected foot ulcer (podiatry and ID following)    10/17/23: Patient had possible aspiration episode this morning around 6am (per night resident), and was desaturating to low 80s, Cxr showed worsening lower lobe pna > transitioned to nonrebreather > maxed out on nonrebreather satting at 91%, BP 88/52, using accessory muscles > transitioned to high flow > no improvement in SpO2, BP 80s/50s > RR called > anesthesia intubated > peripheral IV access acquired and started levophed, and precedex > BP 90s/50s > gave propofol during central line placement > levophed transitioned to central line. Patient is stable and will be transferred to ICU.    MICU course:  Patient arrived to ICU intubated and sedated. Patient on levophed, then weaned off pressors and started on midodrine. Patient initially on precedex, then fentanyl, then weaned off sedation. Serial CXR showing grossly stable b/l lung opacities. Patient successfully extubated on 10/21, transitioned to nasal cannula. Patient desatting on NC, transitioned to HFNC, satting well.   Patient found to have DVT in left common fem and was started on therapeutic Lovenox. Hemoglobin and BP have been stable while on full AC (patient has recent h/o duodenal perforation). CT angio done 10/18 negative for PE.   ID is following, patient on meropenem for PNA. Fungitell elevated, patient started on caspofungin. Meropenem discontinued on 10/24.   On 10/17 patient noted to have episode of whole body rhythmic shaking, left arm jerking, abnormal eye movements. 2mg ativan given. vEEG showed generalized slowing, diffusely expressed triphasic waves, right posterior quadrant sharp waves that are probably epileptiform and sharp transients, small number of stimulus-dependent posterior periodic dischargees. Per neuro patient started on Keppra 500mg bid.   Patient has osteomyelitis of right foot. Xray right foot showed post transmetatarsal amputation of the first ray with periosteal reaction at the amputation stump, suspicious for recurrent osteomyelitis. There is second toe ulcer with osseous erosion/destruction at the second distal phalangeal tuft, consistent with osteomyelitis. MR right foot - 1.  Limited exam. 2.  Osteomyelitis of the first metatarsal stump. 3.  Osteomyelitis of the second toe distal phalanx. Podiatry is following, was initially scheduled for OR debridement on 10/20, which was cancelled d/t patient condition. Podiatry will reassess this week and reschedule surgery.   Speech and swallow evaluated patient - initially patient not appropriate for po trials, continue with NPO with tube feeds. Following speech and swallow re-eval, patient started on pureed with mildly thick liquids.   Labs notable for alkalosis, thought to be due to contraction alkalosis. Patient given NS bolus and started on NS @50 cc/hr. Urine chloride sent.   Patient is stable for transfer to SDU.     To do:  [ ] wean O2  [ ] NIV during sleep   [ ] f/u urine chloride   [ ] f/u ID - pt on caspofungin   [ ] f/u podiatry for OR debridement for OM   [ ] f/u neuro regarding continuing keppra   [ ] monitor CBC while on full AC       LABS:                        11.5   5.54  )-----------( 334      ( 24 Oct 2023 05:38 )             37.6     10-24    141  |  99  |  16  ----------------------------<  145<H>  3.4<L>   |  35<H>  |  0.6<L>    Ca    8.6      24 Oct 2023 05:38  Phos  2.3     10-24  Mg     2.1     10-24    TPro  6.0  /  Alb  2.9<L>  /  TBili  <0.2  /  DBili  x   /  AST  28  /  ALT  21  /  AlkPhos  88  10-24    VITALS:   T(C): 37.2 (10-24-23 @ 12:00), Max: 37.4 (10-23-23 @ 16:00)  HR: 70 (10-24-23 @ 14:00) (56 - 115)  BP: 88/52 (10-24-23 @ 14:00) (76/43 - 153/67)  RR: 20 (10-24-23 @ 14:00) (14 - 26)  SpO2: 93% (10-24-23 @ 14:00) (88% - 99%)    GENERAL: NAD, lying in bed comfortably. on HFNC. Awake, alert. does not follow commands.   HEAD:  Atraumatic, normocephalic  EYES: EOMI, PERRLA  ENT: Moist mucous membranes  NECK: Supple  HEART: Regular rate and rhythm  LUNGS: Unlabored respirations.  Clear to auscultation bilaterally  ABDOMEN: Soft, nontender, nondistended  EXTREMITIES: 2+ peripheral pulses bilaterally.   NERVOUS SYSTEM:  no focal deficits. unable to assess orientation  SKIN: No rashes or lesions

## 2023-10-24 NOTE — PROGRESS NOTE ADULT - ASSESSMENT
Patient is a 58 year old female with a PMHx of Down syndrome, nonverbal at baseline, hypothyroidism, cerebral palsy, congenital pulmonary stenosis, seizures, presents to the ED from nursing facility for syncope associated with tonic-clonic movement witnessed by aide, vitals showed hypoxia and bradycardia for about 10-15 minutes, found to have multilobar pneumonia. Pt with recent admission to ICU for UGIB, found to have duodenal perforation, also found to have PNA, admitted to ICU for septic and hypovolemic shock requiring intubation and pressors.     #Acute hypoxemic respiratory failure s/p intubation  #Aspiration pneumonia   #DVT left common fem on full AC   -Pt being treated for multilobar pna this admission. Possible aspiration event on 10/17 AM, RR called for desats, no improvement on nonrebreather>HFNC>anesthesia intubated patient on floor. R IJ placed, patient started on levo and precedex.   -Pt now off pressors   -midodrine 10 q8  -CXR - Stable bilateral lung opacities  -Duplex 10/17 shows DVT in Left common fem - pt on Lovenox 50mg bid  -CT angio 10/18 - No CT evidence of pulmonary embolism. Bilateral pneumonia.  -Hb stable  -bowel regimen, pt having BM   -Fungitell positive >500  -ID following, on caspofungin. Dc meropenem today (received for 7 days)  -Patient on HFNC   -wean O2  -NIV during sleep   -dc hydrocortisone   -f/u S+S   -f/u urine chloride   - bolus then @50cc/hr     #H/o recent duodenal perforation  -pt on GI ppx  -Pt on therapeutic lovenox for DVT  -Hb stable     #Osteomyelitis, right foot   -Patient with multiple decubitus ulcers on her right foot, less on left foot.   -Xray right foot showed post transmetatarsal amputation of the first ray with periosteal reaction at the amputation stump, suspicious for recurrent osteomyelitis. There is second toe ulcer with osseous erosion/destruction at the second distal phalangeal tuft, consistent with osteomyelitis. There is dorsal   -MR right foot - 1.  Limited exam. 2.  Osteomyelitis of the first metatarsal stump. 3.  Osteomyelitis of the second toe distal phalanx.  -podiatry following, OR for debridement - was scheduled for Friday 10/20 - cancelled and will reassess for surgery next week.    #H/o seizures, not on AED  -On day of admission pt had tonic-clonic movements witnessed by aide  -neuro consulted - Episode suspicious for cardiac event leading to drop in BP and HR. Unclear if epileptic seizure.  -EEG showed generalized slowing, no seizure activity   -OVN 10/17 RN noted another episode, lasted around 30sec with whole body rhythmic shaking, left arm jerking, abnormal eye movements. 2mg ativan given.   -vEEG showed generalized slowing, diffusely expressed triphasic waves, right posterior quadrant sharp waves that are probably epileptiform and sharp transients, small number of stimulus-dependent posterior periodic dischargees.   -Per neuro patient started on Keppra 500mg bid.     #HO polymicrobial bacteremia   #H/o CP    Misc:  Diet - npo with tube feeds for now, f/u S+S  Activity - bedrest  DVT ppx - therapeutic lovenox   Dispo - MICU for now     Lines - peripheral IVs, yen   Drips -NS @50

## 2023-10-24 NOTE — SWALLOW FEES ASSESSMENT ADULT - SLP GENERAL OBSERVATIONS
Pt found laying in bed eyes open and turning to voice. pt not following commands at this time however much more alert. Pt is on o2 50/50 o2 via HFNC

## 2023-10-24 NOTE — CHART NOTE - NSCHARTNOTEFT_GEN_A_CORE
Registered Dietitian Follow-Up     Patient Profile Reviewed                           Yes [x]   No []     Nutrition History Previously Obtained        Yes [x]  No []       Pertinent Medical Interventions: Pt continues to be managed for the following conditions. Passed S+S this AM and was started on PO diet.  Acute hypoxemic respiratory failure   Aspiration pneumonia  DVT   Elevated Fungitell   foot OM     Diet order: Diet, Pureed:   Mildly Thick Liquids (MILDTHICKLIQS) (10-24-23 @ 09:28) [Active]    Anthropometrics:  Height (cm): 144.8 (10-17-23 @ 10:25)  Weight (kg): 60.6 (10-17-23 @ 19:00)  IBW: 38.6 kg  UBW: 52.1 kg    Daily Weight in k.1 (10-24), Weight in k.5 (10-23), Weight in k.6 (10-), Weight in k.6 (10-), Weight in k.5 (10-20), Weight in k.7 (10-19), Weight in k.5 (10-18)  Weight Change: no significant weight change in the past few days    MEDICATIONS  (STANDING):  caspofungin IVPB 50 milliGRAM(s) IV Intermittent every 24 hours     chlorhexidine 2% Cloths 1 Application(s) Topical <User Schedule>  enoxaparin Injectable 50 milliGRAM(s) SubCutaneous every 12 hours  gabapentin 600 milliGRAM(s) Oral daily  levETIRAcetam 500 milliGRAM(s) Oral two times a day  midodrine 10 milliGRAM(s) Oral every 8 hours  multivitamin 1 Tablet(s) Oral daily  pantoprazole   Suspension 40 milliGRAM(s) Oral two times a day  polyethylene glycol 3350 17 Gram(s) Oral daily  raloxifene 60 milliGRAM(s) Oral daily  sodium chloride 0.9%. 1000 milliLiter(s) (50 mL/Hr) IV Continuous <Continuous>    MEDICATIONS  (PRN):  acetaminophen     Tablet .. 650 milliGRAM(s) Oral every 6 hours PRN Temp greater or equal to 38C (100.4F), Mild Pain (1 - 3)    Pertinent Labs: 10-24 @ 05:38: Na 141, BUN 16, Cr 0.6<L>, <H>, K+ 3.4<L>, Phos 2.3, Mg 2.1, Alk Phos 88, ALT/SGPT 21, AST/SGOT 28, HbA1c --    Physical Findings:  - Appearance: alert  - GI function: abdomen rounded; last bowel movement 23-Oct-2023  - Tubes: no feeding tube  - Oral/Mouth cavity: SLP note from S+S this AM pending  - Skin: WOCN 10/18: Friction and Moisture Associated Skin Damage to Bilateral buttocks  - Edema: 1+ generalized edema      Nutrition Requirements:  Weight Used: lowest weight recorded in this admission 53.5 kg     Estimated Energy Needs    Continue []  Adjust [x]  Energy Recommendations: 0208-3552 kcal/day - MSJ 1065*SF 1.2-1.3    Estimated Protein Needs    Continue []  Adjust [x]  Protein Recommendations: 64-75 gm/day - 1.2-1.4g/kg     Estimated Fluid Needs        Continue []  Adjust [x]  Fluid Recommendations: 1605 mL/day - 30ml/kg     Nutrient Intake: Pt was on TF until PO diet started this morning     [x] Previous Nutrition Diagnosis: Inadequate Oral Intake            [x] Ongoing          [] Resolved     Nutrition Education: N/A     Goal/Expected Outcome: Pt will meet >50% estimated needs in 3-5 days     Indicator/Monitoring: Monitor diet order, kcal intake, body composition, NFPE, labs  (lytes, BG, renal indices)    Recommendation:  Diet consistency per SLP  Add Magic cup (290kcal, 9g protein) 1can 2x/day, Prosource gelatein 1can once a day     Patient assessed at high nutrition risk; RD to follow in 3-5 days.   RD spectra [id number], also available on Teams. Registered Dietitian Follow-Up     Patient Profile Reviewed                           Yes [x]   No []     Nutrition History Previously Obtained        Yes [x]  No []       Pertinent Medical Interventions: Pt continues to be managed for the following conditions. Passed S+S this AM and was started on PO diet.  Acute hypoxemic respiratory failure   Aspiration pneumonia  DVT   Elevated Fungitell   foot OM     Diet order: Diet, Pureed:   Mildly Thick Liquids (MILDTHICKLIQS) (10-24-23 @ 09:28) [Active]    Anthropometrics:  Height (cm): 144.8 (10-17-23 @ 10:25)  Weight (kg): 60.6 (10-17-23 @ 19:00)  IBW: 38.6 kg  UBW: 52.1 kg    Daily Weight in k.1 (10-24), Weight in k.5 (10-23), Weight in k.6 (10-), Weight in k.6 (10-), Weight in k.5 (10-20), Weight in k.7 (10-19), Weight in k.5 (10-18)  Weight Change: no significant weight change in the past few days    MEDICATIONS  (STANDING):  caspofungin IVPB 50 milliGRAM(s) IV Intermittent every 24 hours     chlorhexidine 2% Cloths 1 Application(s) Topical <User Schedule>  enoxaparin Injectable 50 milliGRAM(s) SubCutaneous every 12 hours  gabapentin 600 milliGRAM(s) Oral daily  levETIRAcetam 500 milliGRAM(s) Oral two times a day  midodrine 10 milliGRAM(s) Oral every 8 hours  multivitamin 1 Tablet(s) Oral daily  pantoprazole   Suspension 40 milliGRAM(s) Oral two times a day  polyethylene glycol 3350 17 Gram(s) Oral daily  raloxifene 60 milliGRAM(s) Oral daily  sodium chloride 0.9%. 1000 milliLiter(s) (50 mL/Hr) IV Continuous <Continuous>    MEDICATIONS  (PRN):  acetaminophen     Tablet .. 650 milliGRAM(s) Oral every 6 hours PRN Temp greater or equal to 38C (100.4F), Mild Pain (1 - 3)    Pertinent Labs: 10-24 @ 05:38: Na 141, BUN 16, Cr 0.6<L>, <H>, K+ 3.4<L>, Phos 2.3, Mg 2.1, Alk Phos 88, ALT/SGPT 21, AST/SGOT 28, HbA1c --    Physical Findings:  - Appearance: alert  - GI function: abdomen rounded; last bowel movement 23-Oct-2023  - Tubes: no feeding tube  - Oral/Mouth cavity: SLP note from S+S this AM pending  - Skin: WOCN 10/18: Friction and Moisture Associated Skin Damage to Bilateral buttocks  - Edema: 1+ generalized edema      Nutrition Requirements:  Weight Used: lowest weight recorded in this admission 53.5 kg     Estimated Energy Needs    Continue []  Adjust [x]  Energy Recommendations: 7627-2957 kcal/day - MSJ 1065*SF 1.2-1.3    Estimated Protein Needs    Continue []  Adjust [x]  Protein Recommendations: 64-75 gm/day - 1.2-1.4g/kg     Estimated Fluid Needs        Continue []  Adjust [x]  Fluid Recommendations: 1605 mL/day - 30ml/kg     Nutrient Intake: Pt was on TF until PO diet started this morning     [x] Previous Nutrition Diagnosis: Inadequate Oral Intake            [x] Ongoing          [] Resolved     Nutrition Education: N/A     Goal/Expected Outcome: Pt will meet >50% estimated needs in 3-5 days     Indicator/Monitoring: Monitor diet order, kcal intake, body composition, NFPE, labs  (lytes, BG, renal indices)    Recommendation:  Diet consistency per SLP  Add Magic cup (290kcal, 9g protein) 1can 2x/day, Prosource gelatein 1can once a day  Document %tray intake     Patient assessed at high nutrition risk; RD to follow in 3-5 days.   RD spectra [phone], also available on Teams.

## 2023-10-24 NOTE — PROGRESS NOTE ADULT - ASSESSMENT
IMPRESSION:    Acute hypoxemic respiratory failure   Aspiration pneumonia  DVT   Elevated Fungitell   HO recent duodenal perforation   foot OM  HO polymicrobial bacteremia   H/O CP  H/o seizures    PLAN:    CNS: Avoid CNS depressants     HEENT: Oral care.      PULMONARY: HOB at 45 degrees.  Aspiration precaution.  Wean o2 as tolerated.  Keep SaO2 92 TO 96%.  NIV during sleep.  Pulmonary toilet.  Low threshold for intubation.      CARDIOVASCULAR: Goal directed fluid resuscitation.  Continue Midodrine.   NS bolus 250 and 50 cc per hour    GI: Protonix BID.  NG feeding.  Bowel regimen.  Speech follow up     RENAL:  FU lytes.  Correct as needed.      INFECTIOUS DISEASE:  DC meropenem.  Finish Caspo course.      HEMATOLOGICAL: DVT therapy.  Monitor CBC. Lovenox therapeutic    ENDOCRINE:  Follow up FS.  Insulin protocol if needed. DC HC     MUSCULOSKELETAL: Bedrest.  Off loading.  Wound care.      Prognosis guarded.      Madsen for retention

## 2023-10-24 NOTE — PROGRESS NOTE ADULT - SUBJECTIVE AND OBJECTIVE BOX
Patient is a 58y old  Female who presents with a chief complaint of Pre-syncope (23 Oct 2023 16:41)        Over Night Events:  Remains critically ill on HFNCo2.  off pressors.          ROS:     All ROS are negative except HPI         PHYSICAL EXAM    ICU Vital Signs Last 24 Hrs  T(C): 37.1 (24 Oct 2023 04:00), Max: 37.4 (23 Oct 2023 16:00)  T(F): 98.8 (24 Oct 2023 04:00), Max: 99.4 (23 Oct 2023 16:00)  HR: 58 (24 Oct 2023 07:00) (48 - 115)  BP: 87/51 (24 Oct 2023 07:00) (76/43 - 153/67)  BP(mean): 64 (24 Oct 2023 07:00) (55 - 97)  ABP: --  ABP(mean): --  RR: 18 (24 Oct 2023 07:00) (14 - 35)  SpO2: 99% (24 Oct 2023 07:00) (88% - 99%)    O2 Parameters below as of 24 Oct 2023 06:00  Patient On (Oxygen Delivery Method): nasal cannula  O2 Flow (L/min): 60  O2 Concentration (%): 100        CONSTITUTIONAL:  Ill NAD    ENT:   Airway patent,   Mouth with normal mucosa.     EYES:   Pupils equal,   Round and reactive to light.    CARDIAC:   Normal rate,   Regular rhythm.      RESPIRATORY:   No wheezing  Bilateral BS  Normal chest expansion  Not tachypneic,  No use of accessory muscles    GASTROINTESTINAL:  Abdomen soft,   Non-tender,   No guarding,   + BS    MUSCULOSKELETAL:   Range of motion is not limited,  No clubbing, cyanosis    NEUROLOGICAL:   Alert  NO motor  deficits.    SKIN:   Skin normal color for race,   Warm and dry   No evidence of rash.        10-23-23 @ 07:01  -  10-24-23 @ 07:00  --------------------------------------------------------  IN:    Enteral Tube Flush: 180 mL    Miscellaneous Tube Feedin mL  Total IN: 1260 mL    OUT:    Indwelling Catheter - Urethral (mL): 2315 mL  Total OUT: 2315 mL    Total NET: -1055 mL          LABS:                            11.5   5.54  )-----------( 334      ( 24 Oct 2023 05:38 )             37.6                                               10-    141  |  99  |  16  ----------------------------<  145<H>  3.4<L>   |  35<H>  |  0.6<L>    Ca    8.6      24 Oct 2023 05:38  Phos  2.3     10-24  Mg     2.1     10-24    TPro  6.0  /  Alb  2.9<L>  /  TBili  <0.2  /  DBili  x   /  AST  28  /  ALT  21  /  AlkPhos  88  10                                             Urinalysis Basic - ( 24 Oct 2023 05:38 )    Color: x / Appearance: x / SG: x / pH: x  Gluc: 145 mg/dL / Ketone: x  / Bili: x / Urobili: x   Blood: x / Protein: x / Nitrite: x   Leuk Esterase: x / RBC: x / WBC x   Sq Epi: x / Non Sq Epi: x / Bacteria: x                                                  LIVER FUNCTIONS - ( 24 Oct 2023 05:38 )  Alb: 2.9 g/dL / Pro: 6.0 g/dL / ALK PHOS: 88 U/L / ALT: 21 U/L / AST: 28 U/L / GGT: x                                                                                                                                   ABG - ( 24 Oct 2023 05:30 )  pH, Arterial: 7.53  pH, Blood: x     /  pCO2: 51    /  pO2: 99    / HCO3: 43    / Base Excess: 17.5  /  SaO2: 98.5                MEDICATIONS  (STANDING):  caspofungin IVPB 50 milliGRAM(s) IV Intermittent every 24 hours  caspofungin IVPB      chlorhexidine 2% Cloths 1 Application(s) Topical <User Schedule>  enoxaparin Injectable 50 milliGRAM(s) SubCutaneous every 12 hours  gabapentin 600 milliGRAM(s) Oral daily  hydrocortisone sodium succinate Injectable 50 milliGRAM(s) IV Push daily  levETIRAcetam 500 milliGRAM(s) Oral two times a day  meropenem  IVPB 1000 milliGRAM(s) IV Intermittent every 8 hours  midodrine 10 milliGRAM(s) Oral every 8 hours  multivitamin 1 Tablet(s) Oral daily  pantoprazole   Suspension 40 milliGRAM(s) Oral two times a day  polyethylene glycol 3350 17 Gram(s) Oral daily  potassium chloride  20 mEq/100 mL IVPB 20 milliEquivalent(s) IV Intermittent every 2 hours  raloxifene 60 milliGRAM(s) Oral daily    MEDICATIONS  (PRN):  acetaminophen     Tablet .. 650 milliGRAM(s) Oral every 6 hours PRN Temp greater or equal to 38C (100.4F), Mild Pain (1 - 3)      New X-rays reviewed:                                                                                  ECHO

## 2023-10-24 NOTE — PROGRESS NOTE ADULT - SUBJECTIVE AND OBJECTIVE BOX
24H events:    Patient is a 58y old Female who presents with a chief complaint of Pre-syncope (24 Oct 2023 08:41)    Primary diagnosis of Pre-syncope      Day 1:  Day 2:  Day 3:     Today is hospital day 10d. This morning patient was seen and examined at bedside, resting comfortably in bed.    OVN patient was hypotensive with MAP high 50s, given 250cc bolus LR x3. Patient also desatting on  HFNC, FiO2 increased.     Code Status:    Family communication:  Contact date:  Name of person contacted:  Relationship to patient:  Communication details:  What matters most:    PAST MEDICAL & SURGICAL HISTORY  Down syndrome    Osteoporosis    Mild anemia    Neuropathy    S/P debridement  of R hip on 3/2/21      SOCIAL HISTORY:  Social History:  Lives at nursing home (14 Oct 2023 20:33)      ALLERGIES:  No Known Allergies    MEDICATIONS:  STANDING MEDICATIONS  caspofungin IVPB 50 milliGRAM(s) IV Intermittent every 24 hours  caspofungin IVPB      chlorhexidine 2% Cloths 1 Application(s) Topical <User Schedule>  enoxaparin Injectable 50 milliGRAM(s) SubCutaneous every 12 hours  gabapentin 600 milliGRAM(s) Oral daily  hydrocortisone sodium succinate Injectable 50 milliGRAM(s) IV Push daily  levETIRAcetam 500 milliGRAM(s) Oral two times a day  midodrine 10 milliGRAM(s) Oral every 8 hours  multivitamin 1 Tablet(s) Oral daily  pantoprazole   Suspension 40 milliGRAM(s) Oral two times a day  polyethylene glycol 3350 17 Gram(s) Oral daily  raloxifene 60 milliGRAM(s) Oral daily  sodium chloride 0.9%. 1000 milliLiter(s) IV Continuous <Continuous>    PRN MEDICATIONS  acetaminophen     Tablet .. 650 milliGRAM(s) Oral every 6 hours PRN    VITALS:   T(F): 98.3  HR: 67  BP: 111/51  RR: 20  SpO2: 96%    PHYSICAL EXAM:  GENERAL: awake, more alert than yesterday   ( x ) NAD, lying in bed comfortably     (  ) obtunded     (  ) lethargic     (  ) somnolent    HEAD:   ( x ) Atraumatic     (  ) hematoma     (  ) laceration (specify location:       )     NECK:  (x  ) Supple     (  ) neck stiffness     (  ) nuchal rigidity     (  )  no JVD     (  ) JVD present ( -- cm)    HEART:  Rate -->     (x ) normal rate     (  ) bradycardic     (  ) tachycardic  Rhythm -->     ( x ) regular     (  ) regularly irregular     (  ) irregularly irregular  Murmurs -->     (  ) normal s1s2     (  ) systolic murmur     (  ) diastolic murmur     (  ) continuous murmur      (  ) S3 present     (  ) S4 present    LUNGS:   ( x )Unlabored respirations     (  ) tachypnea  ( x ) B/L air entry     (  ) decreased breath sounds in:  (location     )    (  ) no adventitious sound     (  ) crackles     (  ) wheezing      (  ) rhonchi      (specify location:       )  (  ) chest wall tenderness (specify location:       )    ABDOMEN:   ( x ) Soft     (  ) tense   |   ( x ) nondistended     (  ) distended   |   (  ) +BS     (  ) hypoactive bowel sounds     (  ) hyperactive bowel sounds  ( x ) nontender     (  ) RUQ tenderness     (  ) RLQ tenderness     (  ) LLQ tenderness     (  ) epigastric tenderness     (  ) diffuse tenderness  (  ) Splenomegaly      (  ) Hepatomegaly      (  ) Jaundice     (  ) ecchymosis     EXTREMITIES:  (x ) Normal     (  ) Rash     (  ) ecchymosis     (  ) varicose veins      (  ) pitting edema     (  ) non-pitting edema   (  ) ulceration     (  ) gangrene:     (location:     )    NERVOUS SYSTEM:  awake, alert, does not follow commands  (  ) A&Ox3     (  ) confused     (  ) lethargic  CN II-XII:     (  ) Intact     (  ) deficits found     (Specify:     )   Upper extremities:     (  ) no sensorimotor deficits     (  ) weakness     (  ) loss of proprioception/vibration     (  ) loss of touch/temperature (specify:    )  Lower extremities:     (  ) no sensorimotor deficits     (  ) weakness     (  ) loss of proprioception/vibration     (  ) loss of touch/temperature (specify:    )    SKIN:   ( x ) No rashes or lesions     (  ) maculopapular rash     (  ) pustules     (  ) vesicles     (  ) ulcer     (  ) ecchymosis     (specify location:     )    AMPAC score:    (  ) Indwelling Amdsen Catheter:   Date insterted:    Reason (  ) Critical illness     (  ) urinary retention    (  ) Accurate Ins/Outs Monitoring     (  ) CMO patient    (  ) Central Line:   Date inserted:  Location: (  ) Right IJ     (  ) Left IJ     (  ) Right Fem     (  ) Left Fem    (  ) SPC        (  ) pigtail       (  ) PEG tube       (  ) colostomy       (  ) jejunostomy  (  ) U-Dall    LABS:                        11.5   5.54  )-----------( 334      ( 24 Oct 2023 05:38 )             37.6     10-24    141  |  99  |  16  ----------------------------<  145<H>  3.4<L>   |  35<H>  |  0.6<L>    Ca    8.6      24 Oct 2023 05:38  Phos  2.3     10-24  Mg     2.1     10-24    TPro  6.0  /  Alb  2.9<L>  /  TBili  <0.2  /  DBili  x   /  AST  28  /  ALT  21  /  AlkPhos  88  10-24      Urinalysis Basic - ( 24 Oct 2023 05:38 )    Color: x / Appearance: x / SG: x / pH: x  Gluc: 145 mg/dL / Ketone: x  / Bili: x / Urobili: x   Blood: x / Protein: x / Nitrite: x   Leuk Esterase: x / RBC: x / WBC x   Sq Epi: x / Non Sq Epi: x / Bacteria: x      ABG - ( 24 Oct 2023 05:30 )  pH, Arterial: 7.53  pH, Blood: x     /  pCO2: 51    /  pO2: 99    / HCO3: 43    / Base Excess: 17.5  /  SaO2: 98.5                      RADIOLOGY:

## 2023-10-25 LAB
ALBUMIN SERPL ELPH-MCNC: 2.9 G/DL — LOW (ref 3.5–5.2)
ALBUMIN SERPL ELPH-MCNC: 2.9 G/DL — LOW (ref 3.5–5.2)
ALP SERPL-CCNC: 73 U/L — SIGNIFICANT CHANGE UP (ref 30–115)
ALP SERPL-CCNC: 73 U/L — SIGNIFICANT CHANGE UP (ref 30–115)
ALT FLD-CCNC: 28 U/L — SIGNIFICANT CHANGE UP (ref 0–41)
ALT FLD-CCNC: 28 U/L — SIGNIFICANT CHANGE UP (ref 0–41)
ANION GAP SERPL CALC-SCNC: 12 MMOL/L — SIGNIFICANT CHANGE UP (ref 7–14)
ANION GAP SERPL CALC-SCNC: 12 MMOL/L — SIGNIFICANT CHANGE UP (ref 7–14)
AST SERPL-CCNC: 31 U/L — SIGNIFICANT CHANGE UP (ref 0–41)
AST SERPL-CCNC: 31 U/L — SIGNIFICANT CHANGE UP (ref 0–41)
BASOPHILS # BLD AUTO: 0.05 K/UL — SIGNIFICANT CHANGE UP (ref 0–0.2)
BASOPHILS # BLD AUTO: 0.05 K/UL — SIGNIFICANT CHANGE UP (ref 0–0.2)
BASOPHILS NFR BLD AUTO: 0.9 % — SIGNIFICANT CHANGE UP (ref 0–1)
BASOPHILS NFR BLD AUTO: 0.9 % — SIGNIFICANT CHANGE UP (ref 0–1)
BILIRUB SERPL-MCNC: 1.3 MG/DL — HIGH (ref 0.2–1.2)
BILIRUB SERPL-MCNC: 1.3 MG/DL — HIGH (ref 0.2–1.2)
BUN SERPL-MCNC: 10 MG/DL — SIGNIFICANT CHANGE UP (ref 10–20)
BUN SERPL-MCNC: 10 MG/DL — SIGNIFICANT CHANGE UP (ref 10–20)
CALCIUM SERPL-MCNC: 8.5 MG/DL — SIGNIFICANT CHANGE UP (ref 8.4–10.5)
CALCIUM SERPL-MCNC: 8.5 MG/DL — SIGNIFICANT CHANGE UP (ref 8.4–10.5)
CHLORIDE SERPL-SCNC: 99 MMOL/L — SIGNIFICANT CHANGE UP (ref 98–110)
CHLORIDE SERPL-SCNC: 99 MMOL/L — SIGNIFICANT CHANGE UP (ref 98–110)
CO2 SERPL-SCNC: 29 MMOL/L — SIGNIFICANT CHANGE UP (ref 17–32)
CO2 SERPL-SCNC: 29 MMOL/L — SIGNIFICANT CHANGE UP (ref 17–32)
CREAT SERPL-MCNC: 0.5 MG/DL — LOW (ref 0.7–1.5)
CREAT SERPL-MCNC: 0.5 MG/DL — LOW (ref 0.7–1.5)
EGFR: 109 ML/MIN/1.73M2 — SIGNIFICANT CHANGE UP
EGFR: 109 ML/MIN/1.73M2 — SIGNIFICANT CHANGE UP
EOSINOPHIL # BLD AUTO: 0.4 K/UL — SIGNIFICANT CHANGE UP (ref 0–0.7)
EOSINOPHIL # BLD AUTO: 0.4 K/UL — SIGNIFICANT CHANGE UP (ref 0–0.7)
EOSINOPHIL NFR BLD AUTO: 7.5 % — SIGNIFICANT CHANGE UP (ref 0–8)
EOSINOPHIL NFR BLD AUTO: 7.5 % — SIGNIFICANT CHANGE UP (ref 0–8)
GLUCOSE SERPL-MCNC: 81 MG/DL — SIGNIFICANT CHANGE UP (ref 70–99)
GLUCOSE SERPL-MCNC: 81 MG/DL — SIGNIFICANT CHANGE UP (ref 70–99)
HCT VFR BLD CALC: 34.5 % — LOW (ref 37–47)
HCT VFR BLD CALC: 34.5 % — LOW (ref 37–47)
HGB BLD-MCNC: 10.5 G/DL — LOW (ref 12–16)
HGB BLD-MCNC: 10.5 G/DL — LOW (ref 12–16)
IMM GRANULOCYTES NFR BLD AUTO: 0.9 % — HIGH (ref 0.1–0.3)
IMM GRANULOCYTES NFR BLD AUTO: 0.9 % — HIGH (ref 0.1–0.3)
LYMPHOCYTES # BLD AUTO: 2.15 K/UL — SIGNIFICANT CHANGE UP (ref 1.2–3.4)
LYMPHOCYTES # BLD AUTO: 2.15 K/UL — SIGNIFICANT CHANGE UP (ref 1.2–3.4)
LYMPHOCYTES # BLD AUTO: 40.1 % — SIGNIFICANT CHANGE UP (ref 20.5–51.1)
LYMPHOCYTES # BLD AUTO: 40.1 % — SIGNIFICANT CHANGE UP (ref 20.5–51.1)
MAGNESIUM SERPL-MCNC: 1.9 MG/DL — SIGNIFICANT CHANGE UP (ref 1.8–2.4)
MAGNESIUM SERPL-MCNC: 1.9 MG/DL — SIGNIFICANT CHANGE UP (ref 1.8–2.4)
MCHC RBC-ENTMCNC: 23.6 PG — LOW (ref 27–31)
MCHC RBC-ENTMCNC: 23.6 PG — LOW (ref 27–31)
MCHC RBC-ENTMCNC: 30.4 G/DL — LOW (ref 32–37)
MCHC RBC-ENTMCNC: 30.4 G/DL — LOW (ref 32–37)
MCV RBC AUTO: 77.7 FL — LOW (ref 81–99)
MCV RBC AUTO: 77.7 FL — LOW (ref 81–99)
MONOCYTES # BLD AUTO: 0.56 K/UL — SIGNIFICANT CHANGE UP (ref 0.1–0.6)
MONOCYTES # BLD AUTO: 0.56 K/UL — SIGNIFICANT CHANGE UP (ref 0.1–0.6)
MONOCYTES NFR BLD AUTO: 10.4 % — HIGH (ref 1.7–9.3)
MONOCYTES NFR BLD AUTO: 10.4 % — HIGH (ref 1.7–9.3)
NEUTROPHILS # BLD AUTO: 2.15 K/UL — SIGNIFICANT CHANGE UP (ref 1.4–6.5)
NEUTROPHILS # BLD AUTO: 2.15 K/UL — SIGNIFICANT CHANGE UP (ref 1.4–6.5)
NEUTROPHILS NFR BLD AUTO: 40.2 % — LOW (ref 42.2–75.2)
NEUTROPHILS NFR BLD AUTO: 40.2 % — LOW (ref 42.2–75.2)
NRBC # BLD: 0 /100 WBCS — SIGNIFICANT CHANGE UP (ref 0–0)
NRBC # BLD: 0 /100 WBCS — SIGNIFICANT CHANGE UP (ref 0–0)
PHOSPHATE SERPL-MCNC: 2.9 MG/DL — SIGNIFICANT CHANGE UP (ref 2.1–4.9)
PHOSPHATE SERPL-MCNC: 2.9 MG/DL — SIGNIFICANT CHANGE UP (ref 2.1–4.9)
PLATELET # BLD AUTO: 304 K/UL — SIGNIFICANT CHANGE UP (ref 130–400)
PLATELET # BLD AUTO: 304 K/UL — SIGNIFICANT CHANGE UP (ref 130–400)
PMV BLD: 9.6 FL — SIGNIFICANT CHANGE UP (ref 7.4–10.4)
PMV BLD: 9.6 FL — SIGNIFICANT CHANGE UP (ref 7.4–10.4)
POTASSIUM SERPL-MCNC: 3.4 MMOL/L — LOW (ref 3.5–5)
POTASSIUM SERPL-MCNC: 3.4 MMOL/L — LOW (ref 3.5–5)
POTASSIUM SERPL-SCNC: 3.4 MMOL/L — LOW (ref 3.5–5)
POTASSIUM SERPL-SCNC: 3.4 MMOL/L — LOW (ref 3.5–5)
PROCALCITONIN SERPL-MCNC: 0.26 NG/ML — HIGH (ref 0.02–0.1)
PROCALCITONIN SERPL-MCNC: 0.26 NG/ML — HIGH (ref 0.02–0.1)
PROT SERPL-MCNC: 5.6 G/DL — LOW (ref 6–8)
PROT SERPL-MCNC: 5.6 G/DL — LOW (ref 6–8)
RBC # BLD: 4.44 M/UL — SIGNIFICANT CHANGE UP (ref 4.2–5.4)
RBC # BLD: 4.44 M/UL — SIGNIFICANT CHANGE UP (ref 4.2–5.4)
RBC # FLD: 26.7 % — HIGH (ref 11.5–14.5)
RBC # FLD: 26.7 % — HIGH (ref 11.5–14.5)
SODIUM SERPL-SCNC: 140 MMOL/L — SIGNIFICANT CHANGE UP (ref 135–146)
SODIUM SERPL-SCNC: 140 MMOL/L — SIGNIFICANT CHANGE UP (ref 135–146)
WBC # BLD: 5.36 K/UL — SIGNIFICANT CHANGE UP (ref 4.8–10.8)
WBC # BLD: 5.36 K/UL — SIGNIFICANT CHANGE UP (ref 4.8–10.8)
WBC # FLD AUTO: 5.36 K/UL — SIGNIFICANT CHANGE UP (ref 4.8–10.8)
WBC # FLD AUTO: 5.36 K/UL — SIGNIFICANT CHANGE UP (ref 4.8–10.8)

## 2023-10-25 PROCEDURE — 99291 CRITICAL CARE FIRST HOUR: CPT

## 2023-10-25 RX ORDER — POTASSIUM CHLORIDE 20 MEQ
20 PACKET (EA) ORAL ONCE
Refills: 0 | Status: COMPLETED | OUTPATIENT
Start: 2023-10-25 | End: 2023-10-25

## 2023-10-25 RX ORDER — LEVETIRACETAM 250 MG/1
500 TABLET, FILM COATED ORAL
Refills: 0 | Status: DISCONTINUED | OUTPATIENT
Start: 2023-10-25 | End: 2023-11-10

## 2023-10-25 RX ADMIN — RALOXIFENE HYDROCHLORIDE 60 MILLIGRAM(S): 60 TABLET, COATED ORAL at 12:04

## 2023-10-25 RX ADMIN — MIDODRINE HYDROCHLORIDE 10 MILLIGRAM(S): 2.5 TABLET ORAL at 05:54

## 2023-10-25 RX ADMIN — LEVETIRACETAM 500 MILLIGRAM(S): 250 TABLET, FILM COATED ORAL at 17:52

## 2023-10-25 RX ADMIN — Medication 1 TABLET(S): at 12:04

## 2023-10-25 RX ADMIN — MIDODRINE HYDROCHLORIDE 10 MILLIGRAM(S): 2.5 TABLET ORAL at 13:44

## 2023-10-25 RX ADMIN — ENOXAPARIN SODIUM 50 MILLIGRAM(S): 100 INJECTION SUBCUTANEOUS at 17:04

## 2023-10-25 RX ADMIN — ENOXAPARIN SODIUM 50 MILLIGRAM(S): 100 INJECTION SUBCUTANEOUS at 05:54

## 2023-10-25 RX ADMIN — PANTOPRAZOLE SODIUM 40 MILLIGRAM(S): 20 TABLET, DELAYED RELEASE ORAL at 17:04

## 2023-10-25 RX ADMIN — MIDODRINE HYDROCHLORIDE 10 MILLIGRAM(S): 2.5 TABLET ORAL at 21:37

## 2023-10-25 RX ADMIN — CASPOFUNGIN ACETATE 260 MILLIGRAM(S): 7 INJECTION, POWDER, LYOPHILIZED, FOR SOLUTION INTRAVENOUS at 12:04

## 2023-10-25 RX ADMIN — PANTOPRAZOLE SODIUM 40 MILLIGRAM(S): 20 TABLET, DELAYED RELEASE ORAL at 05:55

## 2023-10-25 RX ADMIN — Medication 50 MILLIEQUIVALENT(S): at 07:04

## 2023-10-25 RX ADMIN — LEVETIRACETAM 500 MILLIGRAM(S): 250 TABLET, FILM COATED ORAL at 05:52

## 2023-10-25 RX ADMIN — POLYETHYLENE GLYCOL 3350 17 GRAM(S): 17 POWDER, FOR SOLUTION ORAL at 12:04

## 2023-10-25 RX ADMIN — CHLORHEXIDINE GLUCONATE 1 APPLICATION(S): 213 SOLUTION TOPICAL at 05:53

## 2023-10-25 RX ADMIN — GABAPENTIN 600 MILLIGRAM(S): 400 CAPSULE ORAL at 12:04

## 2023-10-25 NOTE — CHART NOTE - NSCHARTNOTEFT_GEN_A_CORE
Transfer from: ICU   Transfer to: SDU     HPI:  Patient is a 58 year old female with a PMHx of Down syndrome, nonverbal at baseline, hypothyroidism, cerebral palsy, congenital pulmonary stenosis, Seizures, presents to the ED from nursing facility for syncope associated with tonic-clonic movement witnessed by aide. Initial vitals at nursing home showed bradycardia and hypoxia for about 10-15 minutes. No fevers, chill, cough, vomiting, diarrhea, difficulty breathing.     Hospital course 9/29/2023 -> 10/13/2023 Patient was admitted  for hemoptysis and duodenal perforation requiring intubation and ICU admission for hypovolemic and septic shock requiring temporary pressor support, which resolved. Patient also had acute hypoxemic respiratory failure likely due to multifocal pneumonia. Patient was successfully extubated and completed a course of antibiotics per ID, further infectious workup was unremarkable. Repeat CTAP with contrast showed no contrast extravasation and previous seen foci of retroperitoneal gas no longer identified. The patient was evaluated by surgery and GI, and no acute intervention recommended. Patient with stable hemoglobin. Hospital course complicated by NELA likely prerenal which resolved and NSTEMI likely type II due to shock (Echo 9/30 EF 55 to 60%, Normal left systolic function Diastolic function could not be assessed) with subsequent downtrending trops (0.34 --> 0.11). Patient's hospital course ICU --> Stepdown --> General medical floor. Patient remains hemodynamically stable on general medical, tolerating pureed feeds, and has bowel movements. Patient was discharged on 10/13/2023     Arrive to ED 10/14/2023 from nursing facility for syncope associated with tonic-clonic movement witnessed by aide. Initial vitals at nursing home showed bradycardia and hypoxia for about 10-15 minutes. No fevers, chill, cough, vomiting, diarrhea, difficulty breathing.      - Vital signs: BP: 86/55, HR: 77, RR: 24, SpO2: 98% on 4L NC  - Labs showed troponin: 0,02 ( was 0.34 and then 0,11 2 weeks ago), Creatinine 1.1 ( 0.7 on 10/13)   - Head CT negative for acute pathology  - rEEG: generalized slowing, no seizure activity  - Echo results 9/30 EF 55 to 60 %, Normal left systolic function Diastolic function could not be assessed.   - Now off tele (recent NSTEMI)  - Neuro recs: continue Gabapentin 600mg PO QD F/u outpatient  - zosyn started (10/17) for LLL PNA and infected foot ulcer (podiatry and ID following)    10/17/23: Patient had possible aspiration episode this morning around 6am (per night resident), and was desaturating to low 80s, Cxr showed worsening lower lobe pna > transitioned to nonrebreather > maxed out on nonrebreather satting at 91%, BP 88/52, using accessory muscles > transitioned to high flow > no improvement in SpO2, BP 80s/50s > RR called > anesthesia intubated > peripheral IV access acquired and started levophed, and precedex > BP 90s/50s > gave propofol during central line placement > levophed transitioned to central line. Patient is stable and will be transferred to ICU.    MICU course:  Patient arrived to ICU intubated and sedated. Patient on levophed, then weaned off pressors and started on midodrine. Patient initially on precedex, then fentanyl, then weaned off sedation. Serial CXR showing grossly stable b/l lung opacities. Patient successfully extubated on 10/21, transitioned to nasal cannula. Patient desatting on NC, transitioned to HFNC, satting well.   Patient found to have DVT in left common fem and was started on therapeutic Lovenox. Hemoglobin and BP have been stable while on full AC (patient has recent h/o duodenal perforation). CT angio done 10/18 negative for PE.   ID is following, patient on meropenem for PNA. Fungitell elevated, patient started on caspofungin. Meropenem discontinued on 10/24.   On 10/17 patient noted to have episode of whole body rhythmic shaking, left arm jerking, abnormal eye movements. 2mg ativan given. vEEG showed generalized slowing, diffusely expressed triphasic waves, right posterior quadrant sharp waves that are probably epileptiform and sharp transients, small number of stimulus-dependent posterior periodic dischargees. Per neuro patient started on Keppra 500mg bid.   Patient has osteomyelitis of right foot. Xray right foot showed post transmetatarsal amputation of the first ray with periosteal reaction at the amputation stump, suspicious for recurrent osteomyelitis. There is second toe ulcer with osseous erosion/destruction at the second distal phalangeal tuft, consistent with osteomyelitis. MR right foot - 1.  Limited exam. 2.  Osteomyelitis of the first metatarsal stump. 3.  Osteomyelitis of the second toe distal phalanx. Podiatry is following, was initially scheduled for OR debridement on 10/20, which was cancelled d/t patient condition. Podiatry will reassess this week and reschedule surgery.   Speech and swallow evaluated patient - initially patient not appropriate for po trials, continue with NPO with tube feeds. Following speech and swallow re-eval, patient started on pureed with mildly thick liquids.   Patient is stable for transfer to SDU.     To do:  [ ] wean O2  [ ] NIV during sleep   [ ] f/u ID - pt on caspofungin (total 7 days)  [ ] f/u podiatry for OR debridement for OM   [ ] f/u neuro regarding continuing keppra   [ ] monitor CBC while on full AC       LABS:                        10.5   5.36  )-----------( 304      ( 25 Oct 2023 05:13 )             34.5     10-25    140  |  99  |  10  ----------------------------<  81  3.4<L>   |  29  |  0.5<L>    Ca    8.5      25 Oct 2023 05:13  Phos  2.9     10-25  Mg     1.9     10-25    TPro  5.6<L>  /  Alb  2.9<L>  /  TBili  1.3<H>  /  DBili  x   /  AST  31  /  ALT  28  /  AlkPhos  73  10-25        VITALS:   T(C): 36.9 (10-25-23 @ 13:30), Max: 37.1 (10-24-23 @ 16:00)  HR: 80 (10-25-23 @ 13:00) (50 - 105)  BP: 97/54 (10-25-23 @ 13:30) (84/46 - 130/84)  RR: 20 (10-25-23 @ 13:30) (14 - 28)  SpO2: 100% (10-25-23 @ 13:30) (88% - 100%)    GENERAL: NAD, lying in bed comfortably. on HFNC.  HEAD:  Atraumatic, normocephalic  EYES: EOMI, PERRLA   ENT: Moist mucous membranes  NECK: Supple  HEART: Regular rate and rhythm, no murmurs  LUNGS: Unlabored respirations.  Clear to auscultation bilaterally  ABDOMEN: Soft, nontender, nondistended  EXTREMITIES: 2+ peripheral pulses bilaterally  NERVOUS SYSTEM:  awake, alert, does not follow commands  SKIN: No rashes or lesions

## 2023-10-25 NOTE — CHART NOTE - NSCHARTNOTEFT_GEN_A_CORE
According to plan, the patient was kept on BIPAP , however she removed the NG tube immediately after 1 min. She was again put on High flow due to patient discomfort on BIPAP.

## 2023-10-25 NOTE — PROGRESS NOTE ADULT - SUBJECTIVE AND OBJECTIVE BOX
Patient is a 58y old  Female who presents with a chief complaint of Pre-syncope (24 Oct 2023 10:46)        Over Night Events:  remains on HFNCo2.  Off pressors.         ROS:     All ROS are negative except HPI         PHYSICAL EXAM    ICU Vital Signs Last 24 Hrs  T(C): 36.2 (25 Oct 2023 08:00), Max: 37.2 (24 Oct 2023 12:00)  T(F): 97.2 (25 Oct 2023 08:00), Max: 99 (24 Oct 2023 12:00)  HR: 66 (25 Oct 2023 09:00) (50 - 105)  BP: 100/55 (25 Oct 2023 09:00) (84/46 - 139/64)  BP(mean): 73 (25 Oct 2023 09:00) (62 - 93)  ABP: --  ABP(mean): --  RR: 20 (25 Oct 2023 09:00) (14 - 24)  SpO2: 97% (25 Oct 2023 09:00) (88% - 97%)    O2 Parameters below as of 25 Oct 2023 06:00  Patient On (Oxygen Delivery Method): nasal cannula, high flow            CONSTITUTIONAL:  Ill appearing in  NAD    ENT:   Airway patent,   Mouth with normal mucosa.   NGT     EYES:   Pupils equal,   Round and reactive to light.    CARDIAC:   Normal rate,   Regular rhythm.        RESPIRATORY:   No wheezing  Bilateral BS  Normal chest expansion  Not tachypneic,  No use of accessory muscles    GASTROINTESTINAL:  Abdomen soft,   Non-tender,   No guarding,   + BS    MUSCULOSKELETAL:   Range of motion is limited,  No clubbing, cyanosis    NEUROLOGICAL:   Alert  Does not follow commands     SKIN:   Skin normal color for race,   No evidence of rash.          10-24-23 @ 07:01  -  10-25-23 @ 07:00  --------------------------------------------------------  IN:    IV PiggyBack: 450 mL    Oral Fluid: 300 mL    sodium chloride 0.9%: 1150 mL    Sodium Chloride 0.9% Bolus: 250 mL  Total IN: 2150 mL    OUT:    Indwelling Catheter - Urethral (mL): 2275 mL  Total OUT: 2275 mL    Total NET: -125 mL      10-25-23 @ 07:01  -  10-25-23 @ 09:20  --------------------------------------------------------  IN:    sodium chloride 0.9%: 100 mL  Total IN: 100 mL    OUT:    Indwelling Catheter - Urethral (mL): 425 mL  Total OUT: 425 mL    Total NET: -325 mL          LABS:                            10.5   5.36  )-----------( 304      ( 25 Oct 2023 05:13 )             34.5                                               10-25    140  |  99  |  10  ----------------------------<  81  3.4<L>   |  29  |  0.5<L>    Ca    8.5      25 Oct 2023 05:13  Phos  2.9     10-25  Mg     1.9     10-25    TPro  5.6<L>  /  Alb  2.9<L>  /  TBili  1.3<H>  /  DBili  x   /  AST  31  /  ALT  28  /  AlkPhos  73  10-25                                             Urinalysis Basic - ( 25 Oct 2023 05:13 )    Color: x / Appearance: x / SG: x / pH: x  Gluc: 81 mg/dL / Ketone: x  / Bili: x / Urobili: x   Blood: x / Protein: x / Nitrite: x   Leuk Esterase: x / RBC: x / WBC x   Sq Epi: x / Non Sq Epi: x / Bacteria: x                                                  LIVER FUNCTIONS - ( 25 Oct 2023 05:13 )  Alb: 2.9 g/dL / Pro: 5.6 g/dL / ALK PHOS: 73 U/L / ALT: 28 U/L / AST: 31 U/L / GGT: x                                                                                                                                   ABG - ( 24 Oct 2023 05:30 )  pH, Arterial: 7.53  pH, Blood: x     /  pCO2: 51    /  pO2: 99    / HCO3: 43    / Base Excess: 17.5  /  SaO2: 98.5                MEDICATIONS  (STANDING):  caspofungin IVPB 50 milliGRAM(s) IV Intermittent every 24 hours  caspofungin IVPB      chlorhexidine 2% Cloths 1 Application(s) Topical <User Schedule>  enoxaparin Injectable 50 milliGRAM(s) SubCutaneous every 12 hours  gabapentin 600 milliGRAM(s) Oral daily  levETIRAcetam  Solution 500 milliGRAM(s) Oral two times a day  midodrine 10 milliGRAM(s) Oral every 8 hours  multivitamin 1 Tablet(s) Oral daily  pantoprazole   Suspension 40 milliGRAM(s) Oral two times a day  polyethylene glycol 3350 17 Gram(s) Oral daily  raloxifene 60 milliGRAM(s) Oral daily  sodium chloride 0.9%. 1000 milliLiter(s) (50 mL/Hr) IV Continuous <Continuous>    MEDICATIONS  (PRN):  acetaminophen     Tablet .. 650 milliGRAM(s) Oral every 6 hours PRN Temp greater or equal to 38C (100.4F), Mild Pain (1 - 3)      New X-rays reviewed:                                                                                  ECHO

## 2023-10-25 NOTE — PROGRESS NOTE ADULT - SUBJECTIVE AND OBJECTIVE BOX
JERICHO TEA  58y, Female  Allergy: No Known Allergies      LOS  11d    CHIEF COMPLAINT: Pre-syncope (25 Oct 2023 09:20)      INTERVAL EVENTS/HPI  - No acute events overnight  - T(F): , Max: 98.8 (10-24-23 @ 16:00)  - on HFNC  - WBC Count: 5.36 (10-25-23 @ 05:13)  WBC Count: 5.54 (10-24-23 @ 05:38)     - Creatinine: 0.5 (10-25-23 @ 05:13)  Creatinine: 0.6 (10-24-23 @ 05:38)       ROS  General: Denies rigors, nightsweats  HEENT: Denies headache, rhinorrhea, sore throat, eye pain  CV: Denies CP, palpitations  PULM: Denies wheezing, hemoptysis  GI: Denies hematemesis, hematochezia, melena  : Denies discharge, hematuria  MSK: Denies arthralgias, myalgias  SKIN: Denies rash, lesions  NEURO: Denies paresthesias, weakness  PSYCH: Denies depression, anxiety    VITALS:  T(F): 98.5, Max: 98.8 (10-24-23 @ 16:00)  HR: 80  BP: 97/54  RR: 20Vital Signs Last 24 Hrs  T(C): 36.9 (25 Oct 2023 13:30), Max: 37.1 (24 Oct 2023 16:00)  T(F): 98.5 (25 Oct 2023 13:30), Max: 98.8 (24 Oct 2023 16:00)  HR: 80 (25 Oct 2023 13:00) (50 - 105)  BP: 97/54 (25 Oct 2023 13:30) (84/46 - 130/84)  BP(mean): 71 (25 Oct 2023 13:30) (62 - 99)  RR: 20 (25 Oct 2023 13:30) (14 - 28)  SpO2: 100% (25 Oct 2023 13:30) (88% - 100%)    Parameters below as of 25 Oct 2023 06:00  Patient On (Oxygen Delivery Method): nasal cannula, high flow        PHYSICAL EXAM:  Gen: NAD, resting in bed  HEENT: Normocephalic, atraumatic  Neck: supple, no lymphadenopathy  CV: Regular rate & regular rhythm  Lungs: decreased BS at bases, no fremitus  Abdomen: Soft, BS present  Ext: Warm, well perfused  Neuro: non focal, awake  Skin: no rash, no erythema  Lines: no phlebitis    FH: Non-contributory  Social Hx: Non-contributory    TESTS & MEASUREMENTS:                        10.5   5.36  )-----------( 304      ( 25 Oct 2023 05:13 )             34.5     10-25    140  |  99  |  10  ----------------------------<  81  3.4<L>   |  29  |  0.5<L>    Ca    8.5      25 Oct 2023 05:13  Phos  2.9     10-25  Mg     1.9     10-25    TPro  5.6<L>  /  Alb  2.9<L>  /  TBili  1.3<H>  /  DBili  x   /  AST  31  /  ALT  28  /  AlkPhos  73  10-25      LIVER FUNCTIONS - ( 25 Oct 2023 05:13 )  Alb: 2.9 g/dL / Pro: 5.6 g/dL / ALK PHOS: 73 U/L / ALT: 28 U/L / AST: 31 U/L / GGT: x           Urinalysis Basic - ( 25 Oct 2023 05:13 )    Color: x / Appearance: x / SG: x / pH: x  Gluc: 81 mg/dL / Ketone: x  / Bili: x / Urobili: x   Blood: x / Protein: x / Nitrite: x   Leuk Esterase: x / RBC: x / WBC x   Sq Epi: x / Non Sq Epi: x / Bacteria: x        Culture - Urine (collected 10-17-23 @ 19:37)  Source: Clean Catch Clean Catch (Midstream)  Final Report (10-19-23 @ 00:23):    No growth    Culture - Sputum (collected 10-17-23 @ 19:37)  Source: Trach Asp Tracheal Aspirate  Gram Stain (10-18-23 @ 02:39):    Numerous polymorphonuclear leukocytes per low power field    No Squamous epithelial cells per low power field    Moderate Yeast like cells seen per oil power field  Final Report (10-19-23 @ 21:17):    Normal Respiratory Mili present    Culture - Blood (collected 10-17-23 @ 12:35)  Source: .Blood Blood  Final Report (10-22-23 @ 22:00):    No growth at 5 days    Culture - Sputum (collected 10-04-23 @ 12:00)  Source: Trach Asp Tracheal Aspirate  Gram Stain (10-04-23 @ 22:58):    Few polymorphonuclear leukocytes per low power field    Rare Squamous epithelial cells per low power field    No organisms seen per oil power field  Final Report (10-06-23 @ 10:31):    No growth    Culture - Sputum (collected 10-02-23 @ 11:10)  Source: Trach Asp Tracheal Aspirate  Gram Stain (10-03-23 @ 07:33):    Moderate polymorphonuclear leukocytes per low power field    Rare Squamous epithelial cells per low power field    Few Gram positive cocci in pairs seen per oil power field    Rare Gram Positive Rods seen per oil power field    Rare Yeast like cells seen per oil power field  Final Report (10-04-23 @ 17:12):    No growth at 48 hours    Culture - Urine (collected 09-29-23 @ 10:10)  Source: Clean Catch Clean Catch (Midstream)  Final Report (10-01-23 @ 01:13):    <10,000 CFU/mL Normal Urogenital Mili    Culture - Blood (collected 09-29-23 @ 09:47)  Source: .Blood Blood-Peripheral  Final Report (10-04-23 @ 17:00):    No growth at 5 days    Culture - Blood (collected 09-29-23 @ 09:47)  Source: .Blood Blood-Peripheral  Gram Stain (10-01-23 @ 09:15):    Growth in aerobic bottle: Gram Positive Rods and Gram positive cocci in    pairs    Growth in anaerobic bottle: Gram positive cocci in pairs  Final Report (10-05-23 @ 22:52):    Growth in aerobic bottle: Corynebacterium amycolatum "Susceptibilities    not performed"    Growth in anaerobic bottle: Peptoniphilus harei group "Susceptibilities    not performed"    Growth in aerobic bottle: Gram positive cocci in pairs    Organism seen in Gram stain is non-viable after prolonged    incubation and repeated subculture.    Direct identification is available within approximately 3-5    hours either by Blood Panel Multiplexed PCR or Direct    MALDI-TOF. Details: [email or web address]  Organism: Blood Culture PCR (10-05-23 @ 22:52)  Organism: Blood Culture PCR (10-05-23 @ 22:52)      Method Type: PCR      -  Blood PCR Panel: NEG            INFECTIOUS DISEASES TESTING  Procalcitonin, Serum: 0.26 (10-24-23 @ 11:00)  MRSA PCR Result.: Negative (10-17-23 @ 17:20)  Fungitell: >500 (10-17-23 @ 17:20)  Procalcitonin, Serum: 4.36 (10-17-23 @ 17:20)  Procalcitonin, Serum: 4.18 (10-17-23 @ 12:35)  Procalcitonin, Serum: 0.07 (10-15-23 @ 07:28)  COVID-19 PCR: NotDetec (10-12-23 @ 09:14)  Procalcitonin, Serum: 0.40 (10-07-23 @ 10:54)  Legionella Antigen, Urine: Negative (09-30-23 @ 14:36)  MRSA PCR Result.: Negative (09-29-23 @ 16:50)  Procalcitonin, Serum: 9.78 (08-12-23 @ 11:25)  MRSA PCR Result.: Negative (08-12-23 @ 06:10)  Rapid RVP Result: NotDetec (08-12-23 @ 01:33)  Procalcitonin, Serum: 0.63 (08-10-23 @ 08:46)  Procalcitonin, Serum: 7.82 (08-04-23 @ 16:13)  MRSA PCR Result.: Negative (08-04-23 @ 14:52)  Rapid RVP Result: NotDetec (08-04-23 @ 03:00)      INFLAMMATORY MARKERS  Sedimentation Rate, Erythrocyte: 67 mm/Hr (10-15-23 @ 07:28)  C-Reactive Protein, Serum: 16.1 mg/L (10-15-23 @ 07:28)      RADIOLOGY & ADDITIONAL TESTS:  I have personally reviewed the last available Chest xray  CXR      CT      CARDIOLOGY TESTING  12 Lead ECG:   Ventricular Rate 115 BPM    Atrial Rate 115 BPM    P-R Interval 130 ms    QRS Duration 68 ms    Q-T Interval 348 ms    QTC Calculation(Bazett) 481 ms    P Axis 43 degrees    R Axis 99 degrees    T Axis 22 degrees    Diagnosis Line Sinus tachycardia  Rightward axis  Otherwise normal ECG    Confirmed by Gordo Mckee ([id number]) on 10/16/2023 12:41:36 PM (10-14-23 @ 16:43)  12 Lead ECG:   Ventricular Rate 152 BPM    Atrial Rate 107 BPM    P-R Interval 98 ms    QRS Duration 68 ms    Q-T Interval 320 ms    QTC Calculation(Bazett) 508 ms    P Axis 21 degrees    R Axis 86 degrees    T Axis 29 degrees    Diagnosis Line Normal sinus rhythm  Cannot rule out Anterior infarct , age undetermined  Abnormal ECG  *** Poor data quality, interpretation may be adversely affected  Confirmed by Amol Lopez ([id number]) on 10/15/2023 2:06:47 PM (10-14-23 @ 16:42)      MEDICATIONS  caspofungin IVPB 50 IV Intermittent every 24 hours  caspofungin IVPB     chlorhexidine 2% Cloths 1 Topical <User Schedule>  enoxaparin Injectable 50 SubCutaneous every 12 hours  gabapentin 600 Oral daily  levETIRAcetam  Solution 500 Oral two times a day  midodrine 10 Oral every 8 hours  multivitamin 1 Oral daily  pantoprazole   Suspension 40 Oral two times a day  polyethylene glycol 3350 17 Oral daily  raloxifene 60 Oral daily      WEIGHT  Weight (kg): 60.6 (10-17-23 @ 19:00)  Creatinine: 0.5 mg/dL (10-25-23 @ 05:13)      ANTIBIOTICS:  caspofungin IVPB 50 milliGRAM(s) IV Intermittent every 24 hours  caspofungin IVPB          All available historical records have been reviewed

## 2023-10-25 NOTE — PROGRESS NOTE ADULT - ASSESSMENT
ASSESSMENT   58 year old female with a PMHx of Down syndrome, nonverbal at baseline, hypothyroidism, cerebral palsy, congenital pulmonary stenosis, Seizures, presents to the ED from nursing facility for syncope associated with tonic-clonic movement witnessed by aide. Initial vitals at nursing home showed bradycardia and hypoxia for about 10-15 minutes. N    IMPRESSION  #Seizure like activity   #Right planter foot ulcers - two full thickness ulcers - serous drainage with mild erythema with OM  - MR Foot No Cont, Right (10.16.23 @ 21:51): IMPRESSION: 1.  Limited exam. 2.  Osteomyelitis of the first metatarsal stump. 3.  Osteomyelitis of the second toe distal phalanx.    #Rapid Response 10/17   #HAP   - Xray Chest 1 View- PORTABLE-Urgent (Xray Chest 1 View- PORTABLE-Urgent .) (10.17.23 @ 06:10): Worsening pneumonia, left lower lung.  - trach Cx 10/17 NG   - Procalcitonin, Serum: 4.36(10.17.23 @ 17:20)      #Elevated Fungitell   Fungitell: >500 pg/mL (10.17.23 @ 17:20)    #Buttock decubitus ulcer     #Recently Treated Multifocal PNA    #Down syndrome/Crebral Palsy   #Obesity BMI (kg/m2): 23.7, 26.3  #Abx allergy: No Known Allergies    RECOMMENDATIONS  - completed course of meropenem   - continue caspo until 10/26   - podiatry follow-up for OM     Please call or message on Microsoft Teams if with any questions.  Spectra [phone]

## 2023-10-25 NOTE — PROGRESS NOTE ADULT - ASSESSMENT
IMPRESSION:    Acute hypoxemic respiratory failure   Aspiration pneumonia  DVT   Elevated Fungitell   HO recent duodenal perforation   foot OM  HO polymicrobial bacteremia   H/O CP  H/o seizures    PLAN:    CNS: Avoid CNS depressants     HEENT: Oral care.      PULMONARY: HOB at 45 degrees.  Aspiration precaution.  Wean O2 as tolerated.  Keep SaO2 92 TO 96%.  NIV during sleep.  Pulmonary toilet.      CARDIOVASCULAR: Goal directed fluid resuscitation.  Continue Midodrine.  DC NS     GI: Protonix BID.  Feeding per speech 1:1.  Bowel regimen.       RENAL:  FU lytes.  Correct as needed.      INFECTIOUS DISEASE:  DC meropenem.  Finish Caspo course.      HEMATOLOGICAL: DVT therapy.  Monitor CBC. Lovenox therapeutic    ENDOCRINE:  Follow up FS.  Insulin protocol if needed.    MUSCULOSKELETAL: Bedrest.  Off loading.  Wound care.      Prognosis guarded.      Madsen for retention     SDU

## 2023-10-25 NOTE — SWALLOW BEDSIDE ASSESSMENT ADULT - SLP PERTINENT HISTORY OF CURRENT PROBLEM
Pt is a 59 y/o F w/ PMHx: Down syndrome, nonverbal at baseline, hypothyroidism, cerebral palsy, congenital pulmonary stenosis, seizures, presents to the ED from nursing facility for syncope a/w tonic-clonic movement witnessed by aide. Pt w/ recent admission to ICU for UGIB, found to have duodenal perforation, PNA. Pt currently being treated for AHRF, aspiration PNA. Pt intubated 10/17, s/p extubation 10/21. Pt well known to acute SLP service from current and previous admissions. Pt w/ hx of multiple instrumental swallow studies. Pt's baseline diet is puree, thin liquids.

## 2023-10-26 DIAGNOSIS — K62.89 OTHER SPECIFIED DISEASES OF ANUS AND RECTUM: ICD-10-CM

## 2023-10-26 DIAGNOSIS — G80.9 CEREBRAL PALSY, UNSPECIFIED: ICD-10-CM

## 2023-10-26 DIAGNOSIS — Z78.1 PHYSICAL RESTRAINT STATUS: ICD-10-CM

## 2023-10-26 DIAGNOSIS — Q90.9 DOWN SYNDROME, UNSPECIFIED: ICD-10-CM

## 2023-10-26 DIAGNOSIS — R65.21 SEVERE SEPSIS WITH SEPTIC SHOCK: ICD-10-CM

## 2023-10-26 DIAGNOSIS — E03.9 HYPOTHYROIDISM, UNSPECIFIED: ICD-10-CM

## 2023-10-26 DIAGNOSIS — I95.1 ORTHOSTATIC HYPOTENSION: ICD-10-CM

## 2023-10-26 DIAGNOSIS — A41.9 SEPSIS, UNSPECIFIED ORGANISM: ICD-10-CM

## 2023-10-26 DIAGNOSIS — I21.A1 MYOCARDIAL INFARCTION TYPE 2: ICD-10-CM

## 2023-10-26 DIAGNOSIS — K26.6 CHRONIC OR UNSPECIFIED DUODENAL ULCER WITH BOTH HEMORRHAGE AND PERFORATION: ICD-10-CM

## 2023-10-26 DIAGNOSIS — R04.2 HEMOPTYSIS: ICD-10-CM

## 2023-10-26 DIAGNOSIS — J96.01 ACUTE RESPIRATORY FAILURE WITH HYPOXIA: ICD-10-CM

## 2023-10-26 DIAGNOSIS — R57.1 HYPOVOLEMIC SHOCK: ICD-10-CM

## 2023-10-26 DIAGNOSIS — Z11.52 ENCOUNTER FOR SCREENING FOR COVID-19: ICD-10-CM

## 2023-10-26 DIAGNOSIS — G62.9 POLYNEUROPATHY, UNSPECIFIED: ICD-10-CM

## 2023-10-26 DIAGNOSIS — J69.0 PNEUMONITIS DUE TO INHALATION OF FOOD AND VOMIT: ICD-10-CM

## 2023-10-26 DIAGNOSIS — L89.152 PRESSURE ULCER OF SACRAL REGION, STAGE 2: ICD-10-CM

## 2023-10-26 DIAGNOSIS — F72 SEVERE INTELLECTUAL DISABILITIES: ICD-10-CM

## 2023-10-26 DIAGNOSIS — E87.20 ACIDOSIS, UNSPECIFIED: ICD-10-CM

## 2023-10-26 DIAGNOSIS — N17.9 ACUTE KIDNEY FAILURE, UNSPECIFIED: ICD-10-CM

## 2023-10-26 LAB
ALBUMIN SERPL ELPH-MCNC: 3.3 G/DL — LOW (ref 3.5–5.2)
ALBUMIN SERPL ELPH-MCNC: 3.3 G/DL — LOW (ref 3.5–5.2)
ALP SERPL-CCNC: 87 U/L — SIGNIFICANT CHANGE UP (ref 30–115)
ALP SERPL-CCNC: 87 U/L — SIGNIFICANT CHANGE UP (ref 30–115)
ALT FLD-CCNC: 34 U/L — SIGNIFICANT CHANGE UP (ref 0–41)
ALT FLD-CCNC: 34 U/L — SIGNIFICANT CHANGE UP (ref 0–41)
ANION GAP SERPL CALC-SCNC: 11 MMOL/L — SIGNIFICANT CHANGE UP (ref 7–14)
ANION GAP SERPL CALC-SCNC: 11 MMOL/L — SIGNIFICANT CHANGE UP (ref 7–14)
AST SERPL-CCNC: 29 U/L — SIGNIFICANT CHANGE UP (ref 0–41)
AST SERPL-CCNC: 29 U/L — SIGNIFICANT CHANGE UP (ref 0–41)
BASOPHILS # BLD AUTO: 0.04 K/UL — SIGNIFICANT CHANGE UP (ref 0–0.2)
BASOPHILS # BLD AUTO: 0.04 K/UL — SIGNIFICANT CHANGE UP (ref 0–0.2)
BASOPHILS NFR BLD AUTO: 0.7 % — SIGNIFICANT CHANGE UP (ref 0–1)
BASOPHILS NFR BLD AUTO: 0.7 % — SIGNIFICANT CHANGE UP (ref 0–1)
BILIRUB SERPL-MCNC: 0.3 MG/DL — SIGNIFICANT CHANGE UP (ref 0.2–1.2)
BILIRUB SERPL-MCNC: 0.3 MG/DL — SIGNIFICANT CHANGE UP (ref 0.2–1.2)
BUN SERPL-MCNC: 12 MG/DL — SIGNIFICANT CHANGE UP (ref 10–20)
BUN SERPL-MCNC: 12 MG/DL — SIGNIFICANT CHANGE UP (ref 10–20)
CALCIUM SERPL-MCNC: 8.8 MG/DL — SIGNIFICANT CHANGE UP (ref 8.4–10.5)
CALCIUM SERPL-MCNC: 8.8 MG/DL — SIGNIFICANT CHANGE UP (ref 8.4–10.5)
CHLORIDE SERPL-SCNC: 101 MMOL/L — SIGNIFICANT CHANGE UP (ref 98–110)
CHLORIDE SERPL-SCNC: 101 MMOL/L — SIGNIFICANT CHANGE UP (ref 98–110)
CO2 SERPL-SCNC: 30 MMOL/L — SIGNIFICANT CHANGE UP (ref 17–32)
CO2 SERPL-SCNC: 30 MMOL/L — SIGNIFICANT CHANGE UP (ref 17–32)
CREAT SERPL-MCNC: 0.6 MG/DL — LOW (ref 0.7–1.5)
CREAT SERPL-MCNC: 0.6 MG/DL — LOW (ref 0.7–1.5)
EGFR: 105 ML/MIN/1.73M2 — SIGNIFICANT CHANGE UP
EGFR: 105 ML/MIN/1.73M2 — SIGNIFICANT CHANGE UP
EOSINOPHIL # BLD AUTO: 0.39 K/UL — SIGNIFICANT CHANGE UP (ref 0–0.7)
EOSINOPHIL # BLD AUTO: 0.39 K/UL — SIGNIFICANT CHANGE UP (ref 0–0.7)
EOSINOPHIL NFR BLD AUTO: 6.5 % — SIGNIFICANT CHANGE UP (ref 0–8)
EOSINOPHIL NFR BLD AUTO: 6.5 % — SIGNIFICANT CHANGE UP (ref 0–8)
GLUCOSE SERPL-MCNC: 104 MG/DL — HIGH (ref 70–99)
GLUCOSE SERPL-MCNC: 104 MG/DL — HIGH (ref 70–99)
HCT VFR BLD CALC: 37.7 % — SIGNIFICANT CHANGE UP (ref 37–47)
HCT VFR BLD CALC: 37.7 % — SIGNIFICANT CHANGE UP (ref 37–47)
HGB BLD-MCNC: 11.7 G/DL — LOW (ref 12–16)
HGB BLD-MCNC: 11.7 G/DL — LOW (ref 12–16)
IMM GRANULOCYTES NFR BLD AUTO: 1 % — HIGH (ref 0.1–0.3)
IMM GRANULOCYTES NFR BLD AUTO: 1 % — HIGH (ref 0.1–0.3)
LYMPHOCYTES # BLD AUTO: 1.91 K/UL — SIGNIFICANT CHANGE UP (ref 1.2–3.4)
LYMPHOCYTES # BLD AUTO: 1.91 K/UL — SIGNIFICANT CHANGE UP (ref 1.2–3.4)
LYMPHOCYTES # BLD AUTO: 32 % — SIGNIFICANT CHANGE UP (ref 20.5–51.1)
LYMPHOCYTES # BLD AUTO: 32 % — SIGNIFICANT CHANGE UP (ref 20.5–51.1)
MAGNESIUM SERPL-MCNC: 2.2 MG/DL — SIGNIFICANT CHANGE UP (ref 1.8–2.4)
MAGNESIUM SERPL-MCNC: 2.2 MG/DL — SIGNIFICANT CHANGE UP (ref 1.8–2.4)
MCHC RBC-ENTMCNC: 24.1 PG — LOW (ref 27–31)
MCHC RBC-ENTMCNC: 24.1 PG — LOW (ref 27–31)
MCHC RBC-ENTMCNC: 31 G/DL — LOW (ref 32–37)
MCHC RBC-ENTMCNC: 31 G/DL — LOW (ref 32–37)
MCV RBC AUTO: 77.7 FL — LOW (ref 81–99)
MCV RBC AUTO: 77.7 FL — LOW (ref 81–99)
MONOCYTES # BLD AUTO: 0.47 K/UL — SIGNIFICANT CHANGE UP (ref 0.1–0.6)
MONOCYTES # BLD AUTO: 0.47 K/UL — SIGNIFICANT CHANGE UP (ref 0.1–0.6)
MONOCYTES NFR BLD AUTO: 7.9 % — SIGNIFICANT CHANGE UP (ref 1.7–9.3)
MONOCYTES NFR BLD AUTO: 7.9 % — SIGNIFICANT CHANGE UP (ref 1.7–9.3)
NEUTROPHILS # BLD AUTO: 3.09 K/UL — SIGNIFICANT CHANGE UP (ref 1.4–6.5)
NEUTROPHILS # BLD AUTO: 3.09 K/UL — SIGNIFICANT CHANGE UP (ref 1.4–6.5)
NEUTROPHILS NFR BLD AUTO: 51.9 % — SIGNIFICANT CHANGE UP (ref 42.2–75.2)
NEUTROPHILS NFR BLD AUTO: 51.9 % — SIGNIFICANT CHANGE UP (ref 42.2–75.2)
NRBC # BLD: 0 /100 WBCS — SIGNIFICANT CHANGE UP (ref 0–0)
NRBC # BLD: 0 /100 WBCS — SIGNIFICANT CHANGE UP (ref 0–0)
PHOSPHATE SERPL-MCNC: 3.9 MG/DL — SIGNIFICANT CHANGE UP (ref 2.1–4.9)
PHOSPHATE SERPL-MCNC: 3.9 MG/DL — SIGNIFICANT CHANGE UP (ref 2.1–4.9)
PLATELET # BLD AUTO: 402 K/UL — HIGH (ref 130–400)
PLATELET # BLD AUTO: 402 K/UL — HIGH (ref 130–400)
PMV BLD: 10.2 FL — SIGNIFICANT CHANGE UP (ref 7.4–10.4)
PMV BLD: 10.2 FL — SIGNIFICANT CHANGE UP (ref 7.4–10.4)
POTASSIUM SERPL-MCNC: 4 MMOL/L — SIGNIFICANT CHANGE UP (ref 3.5–5)
POTASSIUM SERPL-MCNC: 4 MMOL/L — SIGNIFICANT CHANGE UP (ref 3.5–5)
POTASSIUM SERPL-SCNC: 4 MMOL/L — SIGNIFICANT CHANGE UP (ref 3.5–5)
POTASSIUM SERPL-SCNC: 4 MMOL/L — SIGNIFICANT CHANGE UP (ref 3.5–5)
PROT SERPL-MCNC: 6.4 G/DL — SIGNIFICANT CHANGE UP (ref 6–8)
PROT SERPL-MCNC: 6.4 G/DL — SIGNIFICANT CHANGE UP (ref 6–8)
RBC # BLD: 4.85 M/UL — SIGNIFICANT CHANGE UP (ref 4.2–5.4)
RBC # BLD: 4.85 M/UL — SIGNIFICANT CHANGE UP (ref 4.2–5.4)
RBC # FLD: 27.5 % — HIGH (ref 11.5–14.5)
RBC # FLD: 27.5 % — HIGH (ref 11.5–14.5)
SODIUM SERPL-SCNC: 142 MMOL/L — SIGNIFICANT CHANGE UP (ref 135–146)
SODIUM SERPL-SCNC: 142 MMOL/L — SIGNIFICANT CHANGE UP (ref 135–146)
WBC # BLD: 5.96 K/UL — SIGNIFICANT CHANGE UP (ref 4.8–10.8)
WBC # BLD: 5.96 K/UL — SIGNIFICANT CHANGE UP (ref 4.8–10.8)
WBC # FLD AUTO: 5.96 K/UL — SIGNIFICANT CHANGE UP (ref 4.8–10.8)
WBC # FLD AUTO: 5.96 K/UL — SIGNIFICANT CHANGE UP (ref 4.8–10.8)

## 2023-10-26 PROCEDURE — 71045 X-RAY EXAM CHEST 1 VIEW: CPT | Mod: 26

## 2023-10-26 PROCEDURE — 99233 SBSQ HOSP IP/OBS HIGH 50: CPT

## 2023-10-26 PROCEDURE — 99232 SBSQ HOSP IP/OBS MODERATE 35: CPT

## 2023-10-26 RX ADMIN — ENOXAPARIN SODIUM 50 MILLIGRAM(S): 100 INJECTION SUBCUTANEOUS at 06:38

## 2023-10-26 RX ADMIN — LEVETIRACETAM 500 MILLIGRAM(S): 250 TABLET, FILM COATED ORAL at 06:42

## 2023-10-26 RX ADMIN — CASPOFUNGIN ACETATE 260 MILLIGRAM(S): 7 INJECTION, POWDER, LYOPHILIZED, FOR SOLUTION INTRAVENOUS at 08:21

## 2023-10-26 RX ADMIN — GABAPENTIN 600 MILLIGRAM(S): 400 CAPSULE ORAL at 11:16

## 2023-10-26 RX ADMIN — LEVETIRACETAM 500 MILLIGRAM(S): 250 TABLET, FILM COATED ORAL at 17:17

## 2023-10-26 RX ADMIN — POLYETHYLENE GLYCOL 3350 17 GRAM(S): 17 POWDER, FOR SOLUTION ORAL at 11:16

## 2023-10-26 RX ADMIN — MIDODRINE HYDROCHLORIDE 10 MILLIGRAM(S): 2.5 TABLET ORAL at 06:37

## 2023-10-26 RX ADMIN — MIDODRINE HYDROCHLORIDE 10 MILLIGRAM(S): 2.5 TABLET ORAL at 13:16

## 2023-10-26 RX ADMIN — CHLORHEXIDINE GLUCONATE 1 APPLICATION(S): 213 SOLUTION TOPICAL at 06:38

## 2023-10-26 RX ADMIN — ENOXAPARIN SODIUM 50 MILLIGRAM(S): 100 INJECTION SUBCUTANEOUS at 17:18

## 2023-10-26 RX ADMIN — PANTOPRAZOLE SODIUM 40 MILLIGRAM(S): 20 TABLET, DELAYED RELEASE ORAL at 17:18

## 2023-10-26 RX ADMIN — PANTOPRAZOLE SODIUM 40 MILLIGRAM(S): 20 TABLET, DELAYED RELEASE ORAL at 06:38

## 2023-10-26 RX ADMIN — MIDODRINE HYDROCHLORIDE 10 MILLIGRAM(S): 2.5 TABLET ORAL at 21:08

## 2023-10-26 RX ADMIN — Medication 1 TABLET(S): at 11:15

## 2023-10-26 RX ADMIN — RALOXIFENE HYDROCHLORIDE 60 MILLIGRAM(S): 60 TABLET, COATED ORAL at 11:15

## 2023-10-26 NOTE — PROGRESS NOTE ADULT - ASSESSMENT
Patient is a 58 year old female with a PMHx of Down syndrome, nonverbal at baseline, hypothyroidism, cerebral palsy, congenital pulmonary stenosis, Seizures, presents to the ED from nursing facility for syncope associated with tonic-clonic movement witnessed by aide. Initial vitals at nursing home showed bradycardia and hypoxia for about 10-15 minutes. No fevers, chill, cough, vomiting, diarrhea, difficulty breathing.     Hospital course 9/29/2023 -> 10/13/2023 Patient was admitted  for hemoptysis and duodenal perforation requiring intubation and ICU admission for hypovolemic and septic shock requiring temporary pressor support, which resolved. Patient also had acute hypoxemic respiratory failure likely due to multifocal pneumonia. Patient was successfully extubated and completed a course of antibiotics per ID, further infectious workup was unremarkable. Repeat CTAP with contrast showed no contrast extravasation and previous seen foci of retroperitoneal gas no longer identified. The patient was evaluated by surgery and GI, and no acute intervention recommended. Patient with stable hemoglobin. Hospital course complicated by NELA likely prerenal which resolved and NSTEMI likely type II due to shock (Echo 9/30 EF 55 to 60%, Normal left systolic function Diastolic function could not be assessed) with subsequent downtrending trops (0.34 --> 0.11). Patient's hospital course ICU --> Stepdown --> General medical floor. Patient remains hemodynamically stable on general medical, tolerating pureed feeds, and has bowel movements. Patient was discharged on 10/13/2023     Arrive to ED 10/14/2023 from nursing facility for syncope associated with tonic-clonic movement witnessed by aide. Initial vitals at nursing home showed bradycardia and hypoxia for about 10-15 minutes. No fevers, chill, cough, vomiting, diarrhea, difficulty breathing.      10/17/23: Patient had possible aspiration episode this morning around 6am (per night resident), and was desaturating to low 80s, Cxr showed worsening lower lobe pna > transitioned to nonrebreather > maxed out on nonrebreather satting at 91%, BP 88/52, using accessory muscles > transitioned to high flow > no improvement in SpO2, BP 80s/50s > RR called > anesthesia intubated > peripheral IV access acquired and started levophed, and precedex > BP 90s/50s > gave propofol during central line placement > levophed transitioned to central line. Patient is stable and will be transferred to ICU.    MICU course:  Patient arrived to ICU intubated and sedated. Patient on levophed, then weaned off pressors and started on midodrine. Patient initially on precedex, then fentanyl, then weaned off sedation. Serial CXR showing grossly stable b/l lung opacities. Patient successfully extubated on 10/21, transitioned to nasal cannula. Patient desatting on NC, transitioned to HFNC, satting well.   Patient found to have DVT in left common fem and was started on therapeutic Lovenox. Hemoglobin and BP have been stable while on full AC (patient has recent h/o duodenal perforation). CT angio done 10/18 negative for PE.   ID is following, patient on meropenem for PNA. Fungitell elevated, patient started on caspofungin. Meropenem discontinued on 10/24.   On 10/17 patient noted to have episode of whole body rhythmic shaking, left arm jerking, abnormal eye movements. 2mg ativan given. vEEG showed generalized slowing, diffusely expressed triphasic waves, right posterior quadrant sharp waves that are probably epileptiform and sharp transients, small number of stimulus-dependent posterior periodic dischargees. Per neuro patient started on Keppra 500mg bid.   Patient has osteomyelitis of right foot. Xray right foot showed post transmetatarsal amputation of the first ray with periosteal reaction at the amputation stump, suspicious for recurrent osteomyelitis. There is second toe ulcer with osseous erosion/destruction at the second distal phalangeal tuft, consistent with osteomyelitis. MR right foot - 1.  Limited exam. 2.  Osteomyelitis of the first metatarsal stump. 3.  Osteomyelitis of the second toe distal phalanx. Podiatry is following, was initially scheduled for OR debridement on 10/20, which was cancelled d/t patient condition. Podiatry will reassess this week and reschedule surgery.   Speech and swallow evaluated patient - initially patient not appropriate for po trials, continue with NPO with tube feeds. Following speech and swallow re-eval, patient started on pureed with mildly thick liquids.     #Acute hypoxemic respiratory failure s/p intubation  #Aspiration pneumonia   #DVT left common fem on full AC   -Pt being treated for multilobar pna this admission. Possible aspiration event on 10/17 AM, RR called for desats, no improvement on nonrebreather>HFNC>anesthesia intubated patient on floor. R IJ placed, patient started on levo and precedex.   -Pt now off pressors   -midodrine 10 q8  -CXR - Stable bilateral lung opacities  -Duplex 10/17 shows DVT in Left common fem - pt on Lovenox 50mg bid  -CT angio 10/18 - No CT evidence of pulmonary embolism. Bilateral pneumonia.  -Hb stable  -bowel regimen, pt having BM   -Fungitell positive >500  -ID following, on caspofungin. Dc meropenem (received for 7 days).  -Patient on HFNC..  -wean O2  -NIV during sleep   -dc hydrocortisone   -f/u S+S :       #H/o recent duodenal perforation  -pt on GI ppx  -Pt on therapeutic lovenox for DVT  -Hb stable     #Osteomyelitis, right foot   -Patient with multiple decubitus ulcers on her right foot, less on left foot.   -Xray right foot showed post transmetatarsal amputation of the first ray with periosteal reaction at the amputation stump, suspicious for recurrent osteomyelitis. There is second toe ulcer with osseous erosion/destruction at the second distal phalangeal tuft, consistent with osteomyelitis. There is dorsal   -MR right foot - 1.  Limited exam. 2.  Osteomyelitis of the first metatarsal stump. 3.  Osteomyelitis of the second toe distal phalanx.  -podiatry following, OR for debridement - was scheduled for Friday 10/20 - cancelled and will reassess for surgery next week.    #H/o seizures, not on AED  -On day of admission pt had tonic-clonic movements witnessed by aide  -neuro consulted - Episode suspicious for cardiac event leading to drop in BP and HR. Unclear if epileptic seizure.  -EEG showed generalized slowing, no seizure activity   -OVN 10/17 RN noted another episode, lasted around 30sec with whole body rhythmic shaking, left arm jerking, abnormal eye movements. 2mg ativan given.   -vEEG showed generalized slowing, diffusely expressed triphasic waves, right posterior quadrant sharp waves that are probably epileptiform and sharp transients, small number of stimulus-dependent posterior periodic dischargees.   -Per neuro patient started on Keppra 500mg bid.         Misc:  Diet -  Pureed diet   Activity - bedrest  DVT ppx - therapeutic lovenox   Dispo - sdu  code- full

## 2023-10-26 NOTE — CHART NOTE - NSCHARTNOTEFT_GEN_A_CORE
Podiatry still following patient-waiting for patient to stabilize before planning for any OR debridement

## 2023-10-26 NOTE — PROGRESS NOTE ADULT - SUBJECTIVE AND OBJECTIVE BOX
24H events:    Patient is a 58y old Female who presents with a chief complaint of Pre-syncope (26 Oct 2023 07:32)  Primary diagnosis of Pre-syncop  Today is hospital day 12d. This morning patient was seen and examined at bedside, resting comfortably in bed.    No acute or major events overnight.    Code Status:    Family communication:  Contact date:  Name of person contacted:  Relationship to patient:  Communication details:  What matters most:    PAST MEDICAL & SURGICAL HISTORY  Down syndrome    Osteoporosis    Mild anemia    Neuropathy    S/P debridement  of R hip on 3/2/21      SOCIAL HISTORY:  Social History:  Lives at nursing home (14 Oct 2023 20:33)      ALLERGIES:  No Known Allergies    MEDICATIONS:  STANDING MEDICATIONS  caspofungin IVPB 50 milliGRAM(s) IV Intermittent every 24 hours  caspofungin IVPB      chlorhexidine 2% Cloths 1 Application(s) Topical <User Schedule>  enoxaparin Injectable 50 milliGRAM(s) SubCutaneous every 12 hours  gabapentin 600 milliGRAM(s) Oral daily  levETIRAcetam 500 milliGRAM(s) Oral two times a day  midodrine 10 milliGRAM(s) Oral every 8 hours  multivitamin 1 Tablet(s) Oral daily  pantoprazole   Suspension 40 milliGRAM(s) Oral two times a day  polyethylene glycol 3350 17 Gram(s) Oral daily  raloxifene 60 milliGRAM(s) Oral daily    PRN MEDICATIONS  acetaminophen     Tablet .. 650 milliGRAM(s) Oral every 6 hours PRN    VITALS:   T(F): 96.7  HR: 94  BP: 122/60  RR: 20  SpO2: 97%    PHYSICAL EXAM:      GENERAL: NAD, lying in bed comfortably. on HFNC. nor verbal   HEAD:  Atraumatic, normocephalic  EYES: EOMI, PERRLA   ENT: Moist mucous membranes  NECK: Supple  HEART: Regular rate and rhythm, no murmurs  LUNGS: Unlabored respirations.  Clear to auscultation bilaterally  ABDOMEN: Soft, nontender, nondistended  EXTREMITIES: 2+ peripheral pulses bilaterally  NERVOUS SYSTEM:  awake, alert, does not follow commands            AMPAC score:    (  ) Indwelling Madsen Catheter:   Date insterted:    Reason (  ) Critical illness     (  ) urinary retention    (  ) Accurate Ins/Outs Monitoring     (  ) CMO patient    (  ) Central Line:   Date inserted:  Location: (  ) Right IJ     (  ) Left IJ     (  ) Right Fem     (  ) Left Fem    (  ) SPC        (  ) pigtail       (  ) PEG tube       (  ) colostomy       (  ) jejunostomy  (  ) U-Dall    LABS:                        11.7   5.96  )-----------( 402      ( 26 Oct 2023 05:53 )             37.7     10-26    142  |  101  |  12  ----------------------------<  104<H>  4.0   |  30  |  0.6<L>    Ca    8.8      26 Oct 2023 05:53  Phos  3.9     10-26  Mg     2.2     10-26    TPro  6.4  /  Alb  3.3<L>  /  TBili  0.3  /  DBili  x   /  AST  29  /  ALT  34  /  AlkPhos  87  10-26      Urinalysis Basic - ( 26 Oct 2023 05:53 )    Color: x / Appearance: x / SG: x / pH: x  Gluc: 104 mg/dL / Ketone: x  / Bili: x / Urobili: x   Blood: x / Protein: x / Nitrite: x   Leuk Esterase: x / RBC: x / WBC x   Sq Epi: x / Non Sq Epi: x / Bacteria: x                RADIOLOGY:

## 2023-10-26 NOTE — PROGRESS NOTE ADULT - SUBJECTIVE AND OBJECTIVE BOX
Over Night Events: events noted, transferred from MICU, on HHFNC    PHYSICAL EXAM    ICU Vital Signs Last 24 Hrs  T(C): 35.7 (26 Oct 2023 04:00), Max: 36.9 (25 Oct 2023 13:30)  T(F): 96.2 (26 Oct 2023 04:00), Max: 98.5 (25 Oct 2023 13:30)  HR: 86 (26 Oct 2023 04:00) (60 - 98)  BP: 108/55 (26 Oct 2023 04:00) (92/52 - 130/84)  BP(mean): 81 (26 Oct 2023 00:00) (66 - 99)  RR: 18 (26 Oct 2023 04:00) (16 - 28)  SpO2: 91% (26 Oct 2023 04:00) (91% - 100%)    O2 Parameters below as of 26 Oct 2023 00:00  Patient On (Oxygen Delivery Method): nasal cannula, high flow            General: ill looking  Lungs: dec bs both bases  Cardiovascular: HARPREET 2/6  Abdomen: Soft, Positive BS  Extremities: No clubbing   Neurological: Non focal       10-25-23 @ 07:01  -  10-26-23 @ 07:00  --------------------------------------------------------  IN:    sodium chloride 0.9%: 100 mL  Total IN: 100 mL    OUT:    Indwelling Catheter - Urethral (mL): 1050 mL    Intermittent Catheterization - Urethral (mL): 1100 mL  Total OUT: 2150 mL    Total NET: -2050 mL          LABS:                          10.5   5.36  )-----------( 304      ( 25 Oct 2023 05:13 )             34.5                                               10-25    140  |  99  |  10  ----------------------------<  81  3.4<L>   |  29  |  0.5<L>    Ca    8.5      25 Oct 2023 05:13  Phos  2.9     10-25  Mg     1.9     10-25    TPro  5.6<L>  /  Alb  2.9<L>  /  TBili  1.3<H>  /  DBili  x   /  AST  31  /  ALT  28  /  AlkPhos  73  10-25                                             Urinalysis Basic - ( 25 Oct 2023 05:13 )    Color: x / Appearance: x / SG: x / pH: x  Gluc: 81 mg/dL / Ketone: x  / Bili: x / Urobili: x   Blood: x / Protein: x / Nitrite: x   Leuk Esterase: x / RBC: x / WBC x   Sq Epi: x / Non Sq Epi: x / Bacteria: x                                                  LIVER FUNCTIONS - ( 25 Oct 2023 05:13 )  Alb: 2.9 g/dL / Pro: 5.6 g/dL / ALK PHOS: 73 U/L / ALT: 28 U/L / AST: 31 U/L / GGT: x                                                                                                                                       MEDICATIONS  (STANDING):  caspofungin IVPB 50 milliGRAM(s) IV Intermittent every 24 hours  caspofungin IVPB      chlorhexidine 2% Cloths 1 Application(s) Topical <User Schedule>  enoxaparin Injectable 50 milliGRAM(s) SubCutaneous every 12 hours  gabapentin 600 milliGRAM(s) Oral daily  levETIRAcetam 500 milliGRAM(s) Oral two times a day  midodrine 10 milliGRAM(s) Oral every 8 hours  multivitamin 1 Tablet(s) Oral daily  pantoprazole   Suspension 40 milliGRAM(s) Oral two times a day  polyethylene glycol 3350 17 Gram(s) Oral daily  raloxifene 60 milliGRAM(s) Oral daily    MEDICATIONS  (PRN):  acetaminophen     Tablet .. 650 milliGRAM(s) Oral every 6 hours PRN Temp greater or equal to 38C (100.4F), Mild Pain (1 - 3)      CXR noted

## 2023-10-26 NOTE — PROGRESS NOTE ADULT - ASSESSMENT
IMPRESSION:    Acute hypoxemic respiratory failure sp extubation on HHFNC  Aspiration pneumonia  DVT   Elevated Fungitell   HO recent duodenal perforation   foot OM  HO polymicrobial bacteremia   H/O CP  H/o seizures    PLAN:    CNS: Avoid CNS depressants     HEENT: Oral care.      PULMONARY: HOB at 45 degrees.  Aspiration precaution.  Wean O2 as tolerated.  Keep SaO2 92 TO 96%.  NIV during sleep.  Pulmonary toilet.      CARDIOVASCULAR: Goal directed fluid resuscitation.  Continue Midodrine.     GI: Protonix BID.  Feeding per speech 1:1.  Bowel regimen.       RENAL:  FU lytes.  Correct as needed.      INFECTIOUS DISEASE:  DC meropenem.  Finish Caspo course.      HEMATOLOGICAL: DVT therapy.  Monitor CBC. Lovenox therapeutic    ENDOCRINE:  Follow up FS.  Insulin protocol if needed.    MUSCULOSKELETAL: Bedrest.  Off loading.  Wound care.      Prognosis guarded.      Madsen for retention     SDU

## 2023-10-27 LAB
ALBUMIN SERPL ELPH-MCNC: 3 G/DL — LOW (ref 3.5–5.2)
ALBUMIN SERPL ELPH-MCNC: 3 G/DL — LOW (ref 3.5–5.2)
ALP SERPL-CCNC: 87 U/L — SIGNIFICANT CHANGE UP (ref 30–115)
ALP SERPL-CCNC: 87 U/L — SIGNIFICANT CHANGE UP (ref 30–115)
ALT FLD-CCNC: 33 U/L — SIGNIFICANT CHANGE UP (ref 0–41)
ALT FLD-CCNC: 33 U/L — SIGNIFICANT CHANGE UP (ref 0–41)
ANION GAP SERPL CALC-SCNC: 9 MMOL/L — SIGNIFICANT CHANGE UP (ref 7–14)
ANION GAP SERPL CALC-SCNC: 9 MMOL/L — SIGNIFICANT CHANGE UP (ref 7–14)
AST SERPL-CCNC: 27 U/L — SIGNIFICANT CHANGE UP (ref 0–41)
AST SERPL-CCNC: 27 U/L — SIGNIFICANT CHANGE UP (ref 0–41)
BASOPHILS # BLD AUTO: 0.06 K/UL — SIGNIFICANT CHANGE UP (ref 0–0.2)
BASOPHILS # BLD AUTO: 0.06 K/UL — SIGNIFICANT CHANGE UP (ref 0–0.2)
BASOPHILS NFR BLD AUTO: 0.9 % — SIGNIFICANT CHANGE UP (ref 0–1)
BASOPHILS NFR BLD AUTO: 0.9 % — SIGNIFICANT CHANGE UP (ref 0–1)
BILIRUB SERPL-MCNC: 0.3 MG/DL — SIGNIFICANT CHANGE UP (ref 0.2–1.2)
BILIRUB SERPL-MCNC: 0.3 MG/DL — SIGNIFICANT CHANGE UP (ref 0.2–1.2)
BUN SERPL-MCNC: 13 MG/DL — SIGNIFICANT CHANGE UP (ref 10–20)
BUN SERPL-MCNC: 13 MG/DL — SIGNIFICANT CHANGE UP (ref 10–20)
CALCIUM SERPL-MCNC: 8.6 MG/DL — SIGNIFICANT CHANGE UP (ref 8.4–10.5)
CALCIUM SERPL-MCNC: 8.6 MG/DL — SIGNIFICANT CHANGE UP (ref 8.4–10.5)
CHLORIDE SERPL-SCNC: 104 MMOL/L — SIGNIFICANT CHANGE UP (ref 98–110)
CHLORIDE SERPL-SCNC: 104 MMOL/L — SIGNIFICANT CHANGE UP (ref 98–110)
CO2 SERPL-SCNC: 30 MMOL/L — SIGNIFICANT CHANGE UP (ref 17–32)
CO2 SERPL-SCNC: 30 MMOL/L — SIGNIFICANT CHANGE UP (ref 17–32)
CREAT SERPL-MCNC: 0.7 MG/DL — SIGNIFICANT CHANGE UP (ref 0.7–1.5)
CREAT SERPL-MCNC: 0.7 MG/DL — SIGNIFICANT CHANGE UP (ref 0.7–1.5)
EGFR: 101 ML/MIN/1.73M2 — SIGNIFICANT CHANGE UP
EGFR: 101 ML/MIN/1.73M2 — SIGNIFICANT CHANGE UP
EOSINOPHIL # BLD AUTO: 0.47 K/UL — SIGNIFICANT CHANGE UP (ref 0–0.7)
EOSINOPHIL # BLD AUTO: 0.47 K/UL — SIGNIFICANT CHANGE UP (ref 0–0.7)
EOSINOPHIL NFR BLD AUTO: 7.3 % — SIGNIFICANT CHANGE UP (ref 0–8)
EOSINOPHIL NFR BLD AUTO: 7.3 % — SIGNIFICANT CHANGE UP (ref 0–8)
GLUCOSE SERPL-MCNC: 121 MG/DL — HIGH (ref 70–99)
GLUCOSE SERPL-MCNC: 121 MG/DL — HIGH (ref 70–99)
HCT VFR BLD CALC: 36.9 % — LOW (ref 37–47)
HCT VFR BLD CALC: 36.9 % — LOW (ref 37–47)
HGB BLD-MCNC: 11.3 G/DL — LOW (ref 12–16)
HGB BLD-MCNC: 11.3 G/DL — LOW (ref 12–16)
IMM GRANULOCYTES NFR BLD AUTO: 0.3 % — SIGNIFICANT CHANGE UP (ref 0.1–0.3)
IMM GRANULOCYTES NFR BLD AUTO: 0.3 % — SIGNIFICANT CHANGE UP (ref 0.1–0.3)
LYMPHOCYTES # BLD AUTO: 1.91 K/UL — SIGNIFICANT CHANGE UP (ref 1.2–3.4)
LYMPHOCYTES # BLD AUTO: 1.91 K/UL — SIGNIFICANT CHANGE UP (ref 1.2–3.4)
LYMPHOCYTES # BLD AUTO: 29.5 % — SIGNIFICANT CHANGE UP (ref 20.5–51.1)
LYMPHOCYTES # BLD AUTO: 29.5 % — SIGNIFICANT CHANGE UP (ref 20.5–51.1)
MAGNESIUM SERPL-MCNC: 2.1 MG/DL — SIGNIFICANT CHANGE UP (ref 1.8–2.4)
MAGNESIUM SERPL-MCNC: 2.1 MG/DL — SIGNIFICANT CHANGE UP (ref 1.8–2.4)
MCHC RBC-ENTMCNC: 23.6 PG — LOW (ref 27–31)
MCHC RBC-ENTMCNC: 23.6 PG — LOW (ref 27–31)
MCHC RBC-ENTMCNC: 30.6 G/DL — LOW (ref 32–37)
MCHC RBC-ENTMCNC: 30.6 G/DL — LOW (ref 32–37)
MCV RBC AUTO: 77 FL — LOW (ref 81–99)
MCV RBC AUTO: 77 FL — LOW (ref 81–99)
MONOCYTES # BLD AUTO: 0.52 K/UL — SIGNIFICANT CHANGE UP (ref 0.1–0.6)
MONOCYTES # BLD AUTO: 0.52 K/UL — SIGNIFICANT CHANGE UP (ref 0.1–0.6)
MONOCYTES NFR BLD AUTO: 8 % — SIGNIFICANT CHANGE UP (ref 1.7–9.3)
MONOCYTES NFR BLD AUTO: 8 % — SIGNIFICANT CHANGE UP (ref 1.7–9.3)
NEUTROPHILS # BLD AUTO: 3.5 K/UL — SIGNIFICANT CHANGE UP (ref 1.4–6.5)
NEUTROPHILS # BLD AUTO: 3.5 K/UL — SIGNIFICANT CHANGE UP (ref 1.4–6.5)
NEUTROPHILS NFR BLD AUTO: 54 % — SIGNIFICANT CHANGE UP (ref 42.2–75.2)
NEUTROPHILS NFR BLD AUTO: 54 % — SIGNIFICANT CHANGE UP (ref 42.2–75.2)
NRBC # BLD: 0 /100 WBCS — SIGNIFICANT CHANGE UP (ref 0–0)
NRBC # BLD: 0 /100 WBCS — SIGNIFICANT CHANGE UP (ref 0–0)
PHOSPHATE SERPL-MCNC: 3.9 MG/DL — SIGNIFICANT CHANGE UP (ref 2.1–4.9)
PHOSPHATE SERPL-MCNC: 3.9 MG/DL — SIGNIFICANT CHANGE UP (ref 2.1–4.9)
PLATELET # BLD AUTO: 417 K/UL — HIGH (ref 130–400)
PLATELET # BLD AUTO: 417 K/UL — HIGH (ref 130–400)
PMV BLD: 9.7 FL — SIGNIFICANT CHANGE UP (ref 7.4–10.4)
PMV BLD: 9.7 FL — SIGNIFICANT CHANGE UP (ref 7.4–10.4)
POTASSIUM SERPL-MCNC: 4.1 MMOL/L — SIGNIFICANT CHANGE UP (ref 3.5–5)
POTASSIUM SERPL-MCNC: 4.1 MMOL/L — SIGNIFICANT CHANGE UP (ref 3.5–5)
POTASSIUM SERPL-SCNC: 4.1 MMOL/L — SIGNIFICANT CHANGE UP (ref 3.5–5)
POTASSIUM SERPL-SCNC: 4.1 MMOL/L — SIGNIFICANT CHANGE UP (ref 3.5–5)
PROT SERPL-MCNC: 6.3 G/DL — SIGNIFICANT CHANGE UP (ref 6–8)
PROT SERPL-MCNC: 6.3 G/DL — SIGNIFICANT CHANGE UP (ref 6–8)
RBC # BLD: 4.79 M/UL — SIGNIFICANT CHANGE UP (ref 4.2–5.4)
RBC # BLD: 4.79 M/UL — SIGNIFICANT CHANGE UP (ref 4.2–5.4)
RBC # FLD: 27.6 % — HIGH (ref 11.5–14.5)
RBC # FLD: 27.6 % — HIGH (ref 11.5–14.5)
SODIUM SERPL-SCNC: 143 MMOL/L — SIGNIFICANT CHANGE UP (ref 135–146)
SODIUM SERPL-SCNC: 143 MMOL/L — SIGNIFICANT CHANGE UP (ref 135–146)
WBC # BLD: 6.48 K/UL — SIGNIFICANT CHANGE UP (ref 4.8–10.8)
WBC # BLD: 6.48 K/UL — SIGNIFICANT CHANGE UP (ref 4.8–10.8)
WBC # FLD AUTO: 6.48 K/UL — SIGNIFICANT CHANGE UP (ref 4.8–10.8)
WBC # FLD AUTO: 6.48 K/UL — SIGNIFICANT CHANGE UP (ref 4.8–10.8)

## 2023-10-27 PROCEDURE — 71045 X-RAY EXAM CHEST 1 VIEW: CPT | Mod: 26

## 2023-10-27 PROCEDURE — 99233 SBSQ HOSP IP/OBS HIGH 50: CPT

## 2023-10-27 PROCEDURE — 99232 SBSQ HOSP IP/OBS MODERATE 35: CPT

## 2023-10-27 RX ORDER — FUROSEMIDE 40 MG
20 TABLET ORAL ONCE
Refills: 0 | Status: COMPLETED | OUTPATIENT
Start: 2023-10-27 | End: 2023-10-27

## 2023-10-27 RX ADMIN — Medication 20 MILLIGRAM(S): at 16:16

## 2023-10-27 RX ADMIN — CHLORHEXIDINE GLUCONATE 1 APPLICATION(S): 213 SOLUTION TOPICAL at 05:03

## 2023-10-27 RX ADMIN — MIDODRINE HYDROCHLORIDE 10 MILLIGRAM(S): 2.5 TABLET ORAL at 21:58

## 2023-10-27 RX ADMIN — PANTOPRAZOLE SODIUM 40 MILLIGRAM(S): 20 TABLET, DELAYED RELEASE ORAL at 17:21

## 2023-10-27 RX ADMIN — RALOXIFENE HYDROCHLORIDE 60 MILLIGRAM(S): 60 TABLET, COATED ORAL at 11:37

## 2023-10-27 RX ADMIN — ENOXAPARIN SODIUM 50 MILLIGRAM(S): 100 INJECTION SUBCUTANEOUS at 17:22

## 2023-10-27 RX ADMIN — PANTOPRAZOLE SODIUM 40 MILLIGRAM(S): 20 TABLET, DELAYED RELEASE ORAL at 05:03

## 2023-10-27 RX ADMIN — MIDODRINE HYDROCHLORIDE 10 MILLIGRAM(S): 2.5 TABLET ORAL at 05:02

## 2023-10-27 RX ADMIN — MIDODRINE HYDROCHLORIDE 10 MILLIGRAM(S): 2.5 TABLET ORAL at 13:31

## 2023-10-27 RX ADMIN — Medication 1 TABLET(S): at 11:37

## 2023-10-27 RX ADMIN — LEVETIRACETAM 500 MILLIGRAM(S): 250 TABLET, FILM COATED ORAL at 17:21

## 2023-10-27 RX ADMIN — ENOXAPARIN SODIUM 50 MILLIGRAM(S): 100 INJECTION SUBCUTANEOUS at 05:03

## 2023-10-27 RX ADMIN — LEVETIRACETAM 500 MILLIGRAM(S): 250 TABLET, FILM COATED ORAL at 05:03

## 2023-10-27 RX ADMIN — GABAPENTIN 600 MILLIGRAM(S): 400 CAPSULE ORAL at 11:37

## 2023-10-27 NOTE — PROGRESS NOTE ADULT - SUBJECTIVE AND OBJECTIVE BOX
T H I S   I S    N O  T   A    F I N A L I Z E D   N O T TEA WILSON  58y, Female  Allergy: No Known Allergies    Hospital Day: 13d    Patient seen and examined earlier today.     PMH/PSH:  PAST MEDICAL & SURGICAL HISTORY:  Down syndrome      Osteoporosis      Mild anemia      Neuropathy      S/P debridement  of R hip on 3/2/21          LAST 24-Hr EVENTS:    VITALS:  T(F): 97 (10-27-23 @ 11:35), Max: 98.4 (10-26-23 @ 16:00)  HR: 145 (10-27-23 @ 11:35)  BP: 114/55 (10-27-23 @ 11:35) (97/53 - 114/55)  RR: 20 (10-27-23 @ 11:35)  SpO2: 94% (10-27-23 @ 11:35)          TESTS & MEASUREMENTS:  Weight/BMI      10-25-23 @ 07:01  -  10-26-23 @ 07:00  --------------------------------------------------------  IN: 100 mL / OUT: 2150 mL / NET: -2050 mL    10-26-23 @ 07:01  -  10-27-23 @ 07:00  --------------------------------------------------------  IN: 0 mL / OUT: 1100 mL / NET: -1100 mL                            11.3   6.48  )-----------( 417      ( 27 Oct 2023 06:03 )             36.9         10-27    143  |  104  |  13  ----------------------------<  121<H>  4.1   |  30  |  0.7    Ca    8.6      27 Oct 2023 06:03  Phos  3.9     10-27  Mg     2.1     10-27    TPro  6.3  /  Alb  3.0<L>  /  TBili  0.3  /  DBili  x   /  AST  27  /  ALT  33  /  AlkPhos  87  10-27    LIVER FUNCTIONS - ( 27 Oct 2023 06:03 )  Alb: 3.0 g/dL / Pro: 6.3 g/dL / ALK PHOS: 87 U/L / ALT: 33 U/L / AST: 27 U/L / GGT: x                 Urinalysis Basic - ( 27 Oct 2023 06:03 )    Color: x / Appearance: x / SG: x / pH: x  Gluc: 121 mg/dL / Ketone: x  / Bili: x / Urobili: x   Blood: x / Protein: x / Nitrite: x   Leuk Esterase: x / RBC: x / WBC x   Sq Epi: x / Non Sq Epi: x / Bacteria: x      Procalcitonin, Serum: 0.26 ng/mL (10-24-23 @ 11:00)  Procalcitonin, Serum: 4.36 ng/mL (10-17-23 @ 17:20)                    Indwelling Urethral Catheter:     Connect To:  Straight Drainage/[place]    Indication:  Urinary Retention / Obstruction (10-27-23 @ 11:45) (not performed)  Indwelling Urethral Catheter:     Connect To:  Straight Drainage/[place]    Indication:  Urinary Retention / Obstruction (10-26-23 @ 10:32) (not performed)      RADIOLOGY, ECG, & ADDITIONAL TESTS:  12 Lead ECG:   Ventricular Rate 123 BPM    Atrial Rate 123 BPM    P-R Interval 124 ms    QRS Duration 74 ms    Q-T Interval 310 ms    QTC Calculation(Bazett) 443 ms    P Axis 33 degrees    R Axis 27 degrees    T Axis 15 degrees    Diagnosis Line Sinus tachycardia  Nonspecific ST abnormality  Abnormal ECG    Confirmed by caleb roberts ([id number]) on 10/25/2023 2:09:24 PM (10-22-23 @ 18:46)    CT Angio Chest PE Protocol w/ IV Cont:   ACC: [id number] EXAM:  CT ANGIO CHEST PULM ART [facility]   ORDERED BY:   RIK TARIQ     PROCEDURE DATE:  10/18/2023          INTERPRETATION:  Possible pulmonary embolism.    Technique:  Approximately 80 cc of Optiray 320 was administered   intravenously, followed by approximate 50 cc saline flush.    Multiple transaxial images of the thorax was obtained, utilizing   pulmonary embolism protocol.  20 cc of contrast material was discarded.    Coronal and sagittal reformatted images were performed. 3-D MIP imaging   was performed.    Comparison: CT thorax October 1, 2023.    Findings:    Pulmonary circulation:    No pulmonary emboli are identified  Tubes/lines:Patient is intubated. Enteric catheter extends below the   hemidiaphragm. Right neck central catheter.    Central airways/ Lung parenchyma/ pleura :Basilar opacifications are seen   with complete atelectasis of the left lower lung field. Groundglass   opacifications and nodularity at both apices, right greater than left.    Chest wall/axilla: unremarkable    Mediastinum/hilum:No lymphadenopathy.    Great vessels/cardiac structures:Heart size is normal.    Upper abdomen:Within normal limits.    Osseous structures:Within normal limits.      .    Impression:    No CT evidence of pulmonary embolism.    Bilateral pneumonia.    Support devices as described.    --- End of Report ---            FAYE JOHNSON MD; Attending Interventional Radiologist  This document has been electronically signed. Oct 18 2023 10:53AM (10-18-23 @ 10:39)    RECENT DIAGNOSTIC ORDERS:  Xray Chest 1 View- PORTABLE-Routine: Routine   Indication: hfnc  Transport: Portable,  w/ Monitor,  w/ Ventilator,  w/ IV Pole  Exam Completed (10-27-23 @ 09:47)      MEDICATIONS:  MEDICATIONS  (STANDING):  chlorhexidine 2% Cloths 1 Application(s) Topical <User Schedule>  enoxaparin Injectable 50 milliGRAM(s) SubCutaneous every 12 hours  gabapentin 600 milliGRAM(s) Oral daily  levETIRAcetam 500 milliGRAM(s) Oral two times a day  midodrine 10 milliGRAM(s) Oral every 8 hours  multivitamin 1 Tablet(s) Oral daily  pantoprazole   Suspension 40 milliGRAM(s) Oral two times a day  polyethylene glycol 3350 17 Gram(s) Oral daily  raloxifene 60 milliGRAM(s) Oral daily    MEDICATIONS  (PRN):  acetaminophen     Tablet .. 650 milliGRAM(s) Oral every 6 hours PRN Temp greater or equal to 38C (100.4F), Mild Pain (1 - 3)      HOME MEDICATIONS:  gabapentin 600 mg oral tablet (10-14)  melatonin 5 mg oral tablet (10-14)  midodrine 5 mg oral tablet (10-14)  Multiple Vitamins oral tablet (10-14)  Protonix 40 mg oral delayed release tablet (10-14)  raloxifene 60 mg oral tablet (10-14)      PHYSICAL EXAM:  GENERAL:   CHEST/LUNG:   HEART:   ABDOMEN:   EXTREMITIES:               JERICHO TEA  58y, Female  Allergy: No Known Allergies    Hospital Day: 13d    Patient seen and examined earlier today. no events reported     PMH/PSH:  PAST MEDICAL & SURGICAL HISTORY:  Down syndrome      Osteoporosis      Mild anemia      Neuropathy      S/P debridement  of R hip on 3/2/21          LAST 24-Hr EVENTS:    VITALS:  T(F): 97 (10-27-23 @ 11:35), Max: 98.4 (10-26-23 @ 16:00)  HR: 145 (10-27-23 @ 11:35) liklky artifacts   BP: 114/55 (10-27-23 @ 11:35) (97/53 - 114/55)  RR: 20 (10-27-23 @ 11:35)  SpO2: 94% (10-27-23 @ 11:35)          TESTS & MEASUREMENTS:  Weight/BMI      10-25-23 @ 07:01  -  10-26-23 @ 07:00  --------------------------------------------------------  IN: 100 mL / OUT: 2150 mL / NET: -2050 mL    10-26-23 @ 07:01  -  10-27-23 @ 07:00  --------------------------------------------------------  IN: 0 mL / OUT: 1100 mL / NET: -1100 mL                            11.3   6.48  )-----------( 417      ( 27 Oct 2023 06:03 )             36.9         10-27    143  |  104  |  13  ----------------------------<  121<H>  4.1   |  30  |  0.7    Ca    8.6      27 Oct 2023 06:03  Phos  3.9     10-27  Mg     2.1     10-27    TPro  6.3  /  Alb  3.0<L>  /  TBili  0.3  /  DBili  x   /  AST  27  /  ALT  33  /  AlkPhos  87  10-27    LIVER FUNCTIONS - ( 27 Oct 2023 06:03 )  Alb: 3.0 g/dL / Pro: 6.3 g/dL / ALK PHOS: 87 U/L / ALT: 33 U/L / AST: 27 U/L / GGT: x                 Urinalysis Basic - ( 27 Oct 2023 06:03 )    Color: x / Appearance: x / SG: x / pH: x  Gluc: 121 mg/dL / Ketone: x  / Bili: x / Urobili: x   Blood: x / Protein: x / Nitrite: x   Leuk Esterase: x / RBC: x / WBC x   Sq Epi: x / Non Sq Epi: x / Bacteria: x      Procalcitonin, Serum: 0.26 ng/mL (10-24-23 @ 11:00)  Procalcitonin, Serum: 4.36 ng/mL (10-17-23 @ 17:20)                    Indwelling Urethral Catheter:     Connect To:  Straight Drainage/[place]    Indication:  Urinary Retention / Obstruction (10-27-23 @ 11:45) (not performed)  Indwelling Urethral Catheter:     Connect To:  Straight Drainage/[place]    Indication:  Urinary Retention / Obstruction (10-26-23 @ 10:32) (not performed)      RADIOLOGY, ECG, & ADDITIONAL TESTS:  12 Lead ECG:   Ventricular Rate 123 BPM    Atrial Rate 123 BPM    P-R Interval 124 ms    QRS Duration 74 ms    Q-T Interval 310 ms    QTC Calculation(Bazett) 443 ms    P Axis 33 degrees    R Axis 27 degrees    T Axis 15 degrees    Diagnosis Line Sinus tachycardia  Nonspecific ST abnormality  Abnormal ECG    Confirmed by caleb roberts ([id number]) on 10/25/2023 2:09:24 PM (10-22-23 @ 18:46)    CT Angio Chest PE Protocol w/ IV Cont:   ACC: [id number] EXAM:  CT ANGIO CHEST PULM ART [facility]   ORDERED BY:   RIK ATRIQ     PROCEDURE DATE:  10/18/2023          INTERPRETATION:  Possible pulmonary embolism.    Technique:  Approximately 80 cc of Optiray 320 was administered   intravenously, followed by approximate 50 cc saline flush.    Multiple transaxial images of the thorax was obtained, utilizing   pulmonary embolism protocol.  20 cc of contrast material was discarded.    Coronal and sagittal reformatted images were performed. 3-D MIP imaging   was performed.    Comparison: CT thorax October 1, 2023.    Findings:    Pulmonary circulation:    No pulmonary emboli are identified  Tubes/lines:Patient is intubated. Enteric catheter extends below the   hemidiaphragm. Right neck central catheter.    Central airways/ Lung parenchyma/ pleura :Basilar opacifications are seen   with complete atelectasis of the left lower lung field. Groundglass   opacifications and nodularity at both apices, right greater than left.    Chest wall/axilla: unremarkable    Mediastinum/hilum:No lymphadenopathy.    Great vessels/cardiac structures:Heart size is normal.    Upper abdomen:Within normal limits.    Osseous structures:Within normal limits.      .    Impression:    No CT evidence of pulmonary embolism.    Bilateral pneumonia.    Support devices as described.    --- End of Report ---            FAYE JOHNSON MD; Attending Interventional Radiologist  This document has been electronically signed. Oct 18 2023 10:53AM (10-18-23 @ 10:39)    RECENT DIAGNOSTIC ORDERS:  Xray Chest 1 View- PORTABLE-Routine: Routine   Indication: hfnc  Transport: Portable,  w/ Monitor,  w/ Ventilator,  w/ IV Pole  Exam Completed (10-27-23 @ 09:47)      MEDICATIONS:  MEDICATIONS  (STANDING):  chlorhexidine 2% Cloths 1 Application(s) Topical <User Schedule>  enoxaparin Injectable 50 milliGRAM(s) SubCutaneous every 12 hours  gabapentin 600 milliGRAM(s) Oral daily  levETIRAcetam 500 milliGRAM(s) Oral two times a day  midodrine 10 milliGRAM(s) Oral every 8 hours  multivitamin 1 Tablet(s) Oral daily  pantoprazole   Suspension 40 milliGRAM(s) Oral two times a day  polyethylene glycol 3350 17 Gram(s) Oral daily  raloxifene 60 milliGRAM(s) Oral daily    MEDICATIONS  (PRN):  acetaminophen     Tablet .. 650 milliGRAM(s) Oral every 6 hours PRN Temp greater or equal to 38C (100.4F), Mild Pain (1 - 3)      HOME MEDICATIONS:  gabapentin 600 mg oral tablet (10-14)  melatonin 5 mg oral tablet (10-14)  midodrine 5 mg oral tablet (10-14)  Multiple Vitamins oral tablet (10-14)  Protonix 40 mg oral delayed release tablet (10-14)  raloxifene 60 mg oral tablet (10-14)      PHYSICAL EXAM:  On exam  General: awake, non verbal, NAD, chronic ill appearance  Lungs:  crackles b/l, normal resp effort on NC   Heart: regular ryhthm   Abdomen: soft, non tender non distended  Ext: contracted LE , feet dressings

## 2023-10-27 NOTE — PROGRESS NOTE ADULT - SUBJECTIVE AND OBJECTIVE BOX
Over Night Events: events noted, on HHFNC 50%, Afebrile    PHYSICAL EXAM    ICU Vital Signs Last 24 Hrs  T(C): 35.9 (27 Oct 2023 04:00), Max: 36.9 (26 Oct 2023 16:00)  T(F): 96.6 (27 Oct 2023 04:00), Max: 98.4 (26 Oct 2023 16:00)  HR: 64 (27 Oct 2023 04:00) (58 - 94)  BP: 97/53 (27 Oct 2023 04:00) (97/53 - 122/60)  BP(mean): 72 (26 Oct 2023 20:00) (72 - 87)  RR: 18 (27 Oct 2023 04:00) (18 - 20)  SpO2: 94% (26 Oct 2023 22:27) (93% - 98%)    O2 Parameters below as of 26 Oct 2023 22:27  Patient On (Oxygen Delivery Method): nasal cannula, high flow, Heated /w humidification  O2 Flow (L/min): 50  O2 Concentration (%): 50        General: ill looking  Lungs: Bilateral rhonchi  Cardiovascular: Regular   Abdomen: Soft, Positive BS  Neurological: Non focal       10-26-23 @ 07:01  -  10-27-23 @ 07:00  --------------------------------------------------------  IN:  Total IN: 0 mL    OUT:    Intermittent Catheterization - Urethral (mL): 700 mL  Total OUT: 700 mL    Total NET: -700 mL          LABS:                          11.3   6.48  )-----------( 417      ( 27 Oct 2023 06:03 )             36.9                                               10-27    143  |  104  |  13  ----------------------------<  121<H>  4.1   |  30  |  0.7    Ca    8.6      27 Oct 2023 06:03  Phos  3.9     10-27  Mg     2.1     10-27    TPro  6.3  /  Alb  3.0<L>  /  TBili  0.3  /  DBili  x   /  AST  27  /  ALT  33  /  AlkPhos  87  10-27                                             Urinalysis Basic - ( 27 Oct 2023 06:03 )    Color: x / Appearance: x / SG: x / pH: x  Gluc: 121 mg/dL / Ketone: x  / Bili: x / Urobili: x   Blood: x / Protein: x / Nitrite: x   Leuk Esterase: x / RBC: x / WBC x   Sq Epi: x / Non Sq Epi: x / Bacteria: x                                                  LIVER FUNCTIONS - ( 27 Oct 2023 06:03 )  Alb: 3.0 g/dL / Pro: 6.3 g/dL / ALK PHOS: 87 U/L / ALT: 33 U/L / AST: 27 U/L / GGT: x                                                                                                                                       MEDICATIONS  (STANDING):  chlorhexidine 2% Cloths 1 Application(s) Topical <User Schedule>  enoxaparin Injectable 50 milliGRAM(s) SubCutaneous every 12 hours  gabapentin 600 milliGRAM(s) Oral daily  levETIRAcetam 500 milliGRAM(s) Oral two times a day  midodrine 10 milliGRAM(s) Oral every 8 hours  multivitamin 1 Tablet(s) Oral daily  pantoprazole   Suspension 40 milliGRAM(s) Oral two times a day  polyethylene glycol 3350 17 Gram(s) Oral daily  raloxifene 60 milliGRAM(s) Oral daily    MEDICATIONS  (PRN):  acetaminophen     Tablet .. 650 milliGRAM(s) Oral every 6 hours PRN Temp greater or equal to 38C (100.4F), Mild Pain (1 - 3)    cxr reviewed

## 2023-10-27 NOTE — PROGRESS NOTE ADULT - ASSESSMENT
Patient is a 58 year old female with a PMHx of Down syndrome, nonverbal at baseline, hypothyroidism, cerebral palsy, congenital pulmonary stenosis, Seizures, presents to the ED from nursing facility for syncope the patient was hospitalized from  9/29/2023 -> 10/13/2023 for hemoptysis and duodenal perforation requiring intubation and ICU admission for hypovolemic and septic shock requiring temporary pressor support and treated for multifocal Pna  Patient's hospital course ICU --> Stepdown --> General medical floor. Patient remains hemodynamically stable on general medical, tolerating pureed feeds, and has bowel movements. Patient was discharged on 10/13/2023 , then she came to the  ED 10/14/2023 from nursing facility for syncope associated with tonic-clonic movement witnessed by aide. Initial vitals at nursing home showed bradycardia and hypoxia for about 10-15 minutes. No fevers, chill, cough, vomiting, diarrhea, difficulty breathing.    Pt initialy was admitted to medical floor then on 10/17 was upgraded to ICU fit rest distress and hypotension    Pt downgraded to CEU ,    Acute hypoxemic respiratory failure -  extubated on 10/21 Now on HFNC   Aspiration pneumonia    DVT   Elevated Fungitell   HO recent duodenal perforation   foot OM R  HO polymicrobial bacteremia   H/O CP  H/o seizure  Skin wounds     MR right foot - 1.  Limited exam. 2.  Osteomyelitis of the first metatarsal stump. 3.  Osteomyelitis of the second toe distal phalanx. Podiatry is following, was initially scheduled for OR debridement on 10/20, which was cancelled d/t patient condition.   Podiatry will  reschedule surgery when more stable   ID on board completed course of meropenem and caspo finished   SLP input appreciated , c/w purred diet   chronic barlow, barlow cath care   Bilateral buttocks, Friction and Moisture Associated Skin Damage  Multiple deep tissue injuries to left foot. Blood filled blisters.   wound care input appreciated   c/w wound care as per reccs frequent turning, off loading,and positioning and skin care as per protocol, Maintain pressure injury prevention, Keep skin clean, Offload heels, Monitor wound for changes and notify provider if any   Podiatry following for wound to right foot.    Neurology input appreciated  Started on  Keppra 500 mg BID for abnormal VEEG , monitor level    Wean O2 as tolerated.  Keep SaO2 92 TO 96%.  NIV during sleep  CXR noted lasix 20 iv given       overall guarded prognosis .

## 2023-10-27 NOTE — PROGRESS NOTE ADULT - ASSESSMENT
IMPRESSION:    Acute hypoxemic respiratory failure sp extubation on HHFNC  Aspiration pneumonia  DVT   Elevated Fungitell   HO recent duodenal perforation   foot OM  HO polymicrobial bacteremia   H/O CP  H/o seizures    PLAN:    CNS: Avoid CNS depressants     HEENT: Oral care.      PULMONARY: HOB at 45 degrees.  Aspiration precaution.  Wean O2 as tolerated.  Keep SaO2 92 TO 96%.  NIV during sleep.  Pulmonary toilet.  repeat CXR    CARDIOVASCULAR: Goal directed fluid resuscitation.  Continue Midodrine.     GI: Protonix BID.  Feeding per speech 1:1.  Bowel regimen.       RENAL:  FU lytes.  Correct as needed.      INFECTIOUS DISEASE:  ABX PER ID    HEMATOLOGICAL: DVT therapy.  Monitor CBC. Lovenox therapeutic    ENDOCRINE:  Follow up FS.  Insulin protocol if needed.    MUSCULOSKELETAL: Bedrest.  Off loading.  Wound care.      Prognosis guarded.      Madsen for retention     SDU

## 2023-10-28 LAB
ALBUMIN SERPL ELPH-MCNC: 3.5 G/DL — SIGNIFICANT CHANGE UP (ref 3.5–5.2)
ALBUMIN SERPL ELPH-MCNC: 3.5 G/DL — SIGNIFICANT CHANGE UP (ref 3.5–5.2)
ALP SERPL-CCNC: 100 U/L — SIGNIFICANT CHANGE UP (ref 30–115)
ALP SERPL-CCNC: 100 U/L — SIGNIFICANT CHANGE UP (ref 30–115)
ALT FLD-CCNC: 39 U/L — SIGNIFICANT CHANGE UP (ref 0–41)
ALT FLD-CCNC: 39 U/L — SIGNIFICANT CHANGE UP (ref 0–41)
ANION GAP SERPL CALC-SCNC: 11 MMOL/L — SIGNIFICANT CHANGE UP (ref 7–14)
ANION GAP SERPL CALC-SCNC: 11 MMOL/L — SIGNIFICANT CHANGE UP (ref 7–14)
AST SERPL-CCNC: 31 U/L — SIGNIFICANT CHANGE UP (ref 0–41)
AST SERPL-CCNC: 31 U/L — SIGNIFICANT CHANGE UP (ref 0–41)
BASOPHILS # BLD AUTO: 0.07 K/UL — SIGNIFICANT CHANGE UP (ref 0–0.2)
BASOPHILS # BLD AUTO: 0.07 K/UL — SIGNIFICANT CHANGE UP (ref 0–0.2)
BASOPHILS NFR BLD AUTO: 1 % — SIGNIFICANT CHANGE UP (ref 0–1)
BASOPHILS NFR BLD AUTO: 1 % — SIGNIFICANT CHANGE UP (ref 0–1)
BILIRUB SERPL-MCNC: 0.4 MG/DL — SIGNIFICANT CHANGE UP (ref 0.2–1.2)
BILIRUB SERPL-MCNC: 0.4 MG/DL — SIGNIFICANT CHANGE UP (ref 0.2–1.2)
BUN SERPL-MCNC: 15 MG/DL — SIGNIFICANT CHANGE UP (ref 10–20)
BUN SERPL-MCNC: 15 MG/DL — SIGNIFICANT CHANGE UP (ref 10–20)
CALCIUM SERPL-MCNC: 9.3 MG/DL — SIGNIFICANT CHANGE UP (ref 8.4–10.4)
CALCIUM SERPL-MCNC: 9.3 MG/DL — SIGNIFICANT CHANGE UP (ref 8.4–10.4)
CHLORIDE SERPL-SCNC: 103 MMOL/L — SIGNIFICANT CHANGE UP (ref 98–110)
CHLORIDE SERPL-SCNC: 103 MMOL/L — SIGNIFICANT CHANGE UP (ref 98–110)
CO2 SERPL-SCNC: 30 MMOL/L — SIGNIFICANT CHANGE UP (ref 17–32)
CO2 SERPL-SCNC: 30 MMOL/L — SIGNIFICANT CHANGE UP (ref 17–32)
CREAT SERPL-MCNC: 0.8 MG/DL — SIGNIFICANT CHANGE UP (ref 0.7–1.5)
CREAT SERPL-MCNC: 0.8 MG/DL — SIGNIFICANT CHANGE UP (ref 0.7–1.5)
EGFR: 86 ML/MIN/1.73M2 — SIGNIFICANT CHANGE UP
EGFR: 86 ML/MIN/1.73M2 — SIGNIFICANT CHANGE UP
EOSINOPHIL # BLD AUTO: 0.33 K/UL — SIGNIFICANT CHANGE UP (ref 0–0.7)
EOSINOPHIL # BLD AUTO: 0.33 K/UL — SIGNIFICANT CHANGE UP (ref 0–0.7)
EOSINOPHIL NFR BLD AUTO: 4.5 % — SIGNIFICANT CHANGE UP (ref 0–8)
EOSINOPHIL NFR BLD AUTO: 4.5 % — SIGNIFICANT CHANGE UP (ref 0–8)
GLUCOSE SERPL-MCNC: 140 MG/DL — HIGH (ref 70–99)
GLUCOSE SERPL-MCNC: 140 MG/DL — HIGH (ref 70–99)
HCT VFR BLD CALC: 37.5 % — SIGNIFICANT CHANGE UP (ref 37–47)
HCT VFR BLD CALC: 37.5 % — SIGNIFICANT CHANGE UP (ref 37–47)
HGB BLD-MCNC: 11.6 G/DL — LOW (ref 12–16)
HGB BLD-MCNC: 11.6 G/DL — LOW (ref 12–16)
IMM GRANULOCYTES NFR BLD AUTO: 0.3 % — SIGNIFICANT CHANGE UP (ref 0.1–0.3)
IMM GRANULOCYTES NFR BLD AUTO: 0.3 % — SIGNIFICANT CHANGE UP (ref 0.1–0.3)
LYMPHOCYTES # BLD AUTO: 1.63 K/UL — SIGNIFICANT CHANGE UP (ref 1.2–3.4)
LYMPHOCYTES # BLD AUTO: 1.63 K/UL — SIGNIFICANT CHANGE UP (ref 1.2–3.4)
LYMPHOCYTES # BLD AUTO: 22.2 % — SIGNIFICANT CHANGE UP (ref 20.5–51.1)
LYMPHOCYTES # BLD AUTO: 22.2 % — SIGNIFICANT CHANGE UP (ref 20.5–51.1)
MAGNESIUM SERPL-MCNC: 2.3 MG/DL — SIGNIFICANT CHANGE UP (ref 1.8–2.4)
MAGNESIUM SERPL-MCNC: 2.3 MG/DL — SIGNIFICANT CHANGE UP (ref 1.8–2.4)
MCHC RBC-ENTMCNC: 24 PG — LOW (ref 27–31)
MCHC RBC-ENTMCNC: 24 PG — LOW (ref 27–31)
MCHC RBC-ENTMCNC: 30.9 G/DL — LOW (ref 32–37)
MCHC RBC-ENTMCNC: 30.9 G/DL — LOW (ref 32–37)
MCV RBC AUTO: 77.6 FL — LOW (ref 81–99)
MCV RBC AUTO: 77.6 FL — LOW (ref 81–99)
MONOCYTES # BLD AUTO: 0.51 K/UL — SIGNIFICANT CHANGE UP (ref 0.1–0.6)
MONOCYTES # BLD AUTO: 0.51 K/UL — SIGNIFICANT CHANGE UP (ref 0.1–0.6)
MONOCYTES NFR BLD AUTO: 6.9 % — SIGNIFICANT CHANGE UP (ref 1.7–9.3)
MONOCYTES NFR BLD AUTO: 6.9 % — SIGNIFICANT CHANGE UP (ref 1.7–9.3)
NEUTROPHILS # BLD AUTO: 4.78 K/UL — SIGNIFICANT CHANGE UP (ref 1.4–6.5)
NEUTROPHILS # BLD AUTO: 4.78 K/UL — SIGNIFICANT CHANGE UP (ref 1.4–6.5)
NEUTROPHILS NFR BLD AUTO: 65.1 % — SIGNIFICANT CHANGE UP (ref 42.2–75.2)
NEUTROPHILS NFR BLD AUTO: 65.1 % — SIGNIFICANT CHANGE UP (ref 42.2–75.2)
NRBC # BLD: 0 /100 WBCS — SIGNIFICANT CHANGE UP (ref 0–0)
NRBC # BLD: 0 /100 WBCS — SIGNIFICANT CHANGE UP (ref 0–0)
PLATELET # BLD AUTO: 463 K/UL — HIGH (ref 130–400)
PLATELET # BLD AUTO: 463 K/UL — HIGH (ref 130–400)
PMV BLD: 10.3 FL — SIGNIFICANT CHANGE UP (ref 7.4–10.4)
PMV BLD: 10.3 FL — SIGNIFICANT CHANGE UP (ref 7.4–10.4)
POTASSIUM SERPL-MCNC: 3.9 MMOL/L — SIGNIFICANT CHANGE UP (ref 3.5–5)
POTASSIUM SERPL-MCNC: 3.9 MMOL/L — SIGNIFICANT CHANGE UP (ref 3.5–5)
POTASSIUM SERPL-SCNC: 3.9 MMOL/L — SIGNIFICANT CHANGE UP (ref 3.5–5)
POTASSIUM SERPL-SCNC: 3.9 MMOL/L — SIGNIFICANT CHANGE UP (ref 3.5–5)
PROT SERPL-MCNC: 6.7 G/DL — SIGNIFICANT CHANGE UP (ref 6–8)
PROT SERPL-MCNC: 6.7 G/DL — SIGNIFICANT CHANGE UP (ref 6–8)
RBC # BLD: 4.83 M/UL — SIGNIFICANT CHANGE UP (ref 4.2–5.4)
RBC # BLD: 4.83 M/UL — SIGNIFICANT CHANGE UP (ref 4.2–5.4)
RBC # FLD: 27.9 % — HIGH (ref 11.5–14.5)
RBC # FLD: 27.9 % — HIGH (ref 11.5–14.5)
SODIUM SERPL-SCNC: 144 MMOL/L — SIGNIFICANT CHANGE UP (ref 135–146)
SODIUM SERPL-SCNC: 144 MMOL/L — SIGNIFICANT CHANGE UP (ref 135–146)
WBC # BLD: 7.34 K/UL — SIGNIFICANT CHANGE UP (ref 4.8–10.8)
WBC # BLD: 7.34 K/UL — SIGNIFICANT CHANGE UP (ref 4.8–10.8)
WBC # FLD AUTO: 7.34 K/UL — SIGNIFICANT CHANGE UP (ref 4.8–10.8)
WBC # FLD AUTO: 7.34 K/UL — SIGNIFICANT CHANGE UP (ref 4.8–10.8)

## 2023-10-28 PROCEDURE — 99233 SBSQ HOSP IP/OBS HIGH 50: CPT

## 2023-10-28 PROCEDURE — 99232 SBSQ HOSP IP/OBS MODERATE 35: CPT

## 2023-10-28 RX ORDER — LANOLIN ALCOHOL/MO/W.PET/CERES
5 CREAM (GRAM) TOPICAL AT BEDTIME
Refills: 0 | Status: DISCONTINUED | OUTPATIENT
Start: 2023-10-28 | End: 2023-11-10

## 2023-10-28 RX ADMIN — LEVETIRACETAM 500 MILLIGRAM(S): 250 TABLET, FILM COATED ORAL at 05:01

## 2023-10-28 RX ADMIN — GABAPENTIN 600 MILLIGRAM(S): 400 CAPSULE ORAL at 12:22

## 2023-10-28 RX ADMIN — LEVETIRACETAM 500 MILLIGRAM(S): 250 TABLET, FILM COATED ORAL at 18:37

## 2023-10-28 RX ADMIN — PANTOPRAZOLE SODIUM 40 MILLIGRAM(S): 20 TABLET, DELAYED RELEASE ORAL at 05:01

## 2023-10-28 RX ADMIN — PANTOPRAZOLE SODIUM 40 MILLIGRAM(S): 20 TABLET, DELAYED RELEASE ORAL at 18:36

## 2023-10-28 RX ADMIN — CHLORHEXIDINE GLUCONATE 1 APPLICATION(S): 213 SOLUTION TOPICAL at 05:01

## 2023-10-28 RX ADMIN — ENOXAPARIN SODIUM 50 MILLIGRAM(S): 100 INJECTION SUBCUTANEOUS at 18:36

## 2023-10-28 RX ADMIN — POLYETHYLENE GLYCOL 3350 17 GRAM(S): 17 POWDER, FOR SOLUTION ORAL at 12:23

## 2023-10-28 RX ADMIN — Medication 1 TABLET(S): at 12:21

## 2023-10-28 RX ADMIN — MIDODRINE HYDROCHLORIDE 10 MILLIGRAM(S): 2.5 TABLET ORAL at 05:01

## 2023-10-28 RX ADMIN — RALOXIFENE HYDROCHLORIDE 60 MILLIGRAM(S): 60 TABLET, COATED ORAL at 12:23

## 2023-10-28 RX ADMIN — MIDODRINE HYDROCHLORIDE 10 MILLIGRAM(S): 2.5 TABLET ORAL at 21:41

## 2023-10-28 RX ADMIN — ENOXAPARIN SODIUM 50 MILLIGRAM(S): 100 INJECTION SUBCUTANEOUS at 05:01

## 2023-10-28 NOTE — PROGRESS NOTE ADULT - ASSESSMENT
IMPRESSION:    Acute hypoxemic respiratory failure sp extubation on HHFNC  Aspiration pneumonia  DVT   Elevated Fungitell   HO recent duodenal perforation   foot OM  HO polymicrobial bacteremia   H/O CP  H/o seizures    PLAN:    CNS: Avoid CNS depressants     HEENT: Oral care.      PULMONARY: HOB at 45 degrees.  Aspiration precaution.  Wean O2 as tolerated.  Keep SaO2 92 TO 96%.  NIV during sleep.  Pulmonary toilet.  repeat CXR noted, low flow trial    CARDIOVASCULAR: Goal directed fluid resuscitation.  Continue Midodrine.     GI: Protonix BID.  Feeding per speech 1:1.  Bowel regimen.       RENAL:  FU lytes.  Correct as needed.      INFECTIOUS DISEASE:  ABX PER ID    HEMATOLOGICAL: DVT therapy.  Monitor CBC. Lovenox therapeutic    ENDOCRINE:  Follow up FS.  Insulin protocol if needed.    MUSCULOSKELETAL: Bedrest.  Off loading.  Wound care.      Prognosis guarded.          SDU

## 2023-10-28 NOTE — PROGRESS NOTE ADULT - ASSESSMENT
Patient is a 58 year old female with a PMHx of Down syndrome, nonverbal at baseline, hypothyroidism, cerebral palsy, congenital pulmonary stenosis, Seizures, presents to the ED from nursing facility for syncope the patient was hospitalized from  9/29/2023 -> 10/13/2023 for hemoptysis and duodenal perforation requiring intubation and ICU admission for hypovolemic and septic shock requiring temporary pressor support and treated for multifocal Pna  Patient's hospital course ICU --> Stepdown --> General medical floor. Patient remains hemodynamically stable on general medical, tolerating pureed feeds, and has bowel movements. Patient was discharged on 10/13/2023 , then she came to the  ED 10/14/2023 from nursing facility for syncope associated with tonic-clonic movement witnessed by aide. Initial vitals at nursing home showed bradycardia and hypoxia for about 10-15 minutes. No fevers, chill, cough, vomiting, diarrhea, difficulty breathing.    Pt initialy was admitted to medical floor then on 10/17 was upgraded to ICU fit rest distress and hypotension    Pt downgraded to CEU ,    Acute hypoxemic respiratory failure -  extubated on 10/21 Now on HFNC   Aspiration pneumonia    DVT   Elevated Fungitell   HO recent duodenal perforation   foot OM R  HO polymicrobial bacteremia   H/O CP  H/o seizure  Skin wounds     MR right foot - 1.  Limited exam. 2.  Osteomyelitis of the first metatarsal stump. 3.  Osteomyelitis of the second toe distal phalanx. Podiatry is following, was initially scheduled for OR debridement on 10/20, which was cancelled d/t patient condition.   Podiatry will  reschedule surgery when more stable   ID on board completed course of meropenem and caspo finished   SLP input appreciated , c/w purred diet   chronic barlow, barlow cath care   Bilateral buttocks, Friction and Moisture Associated Skin Damage  Multiple deep tissue injuries to left foot. Blood filled blisters.   wound care input appreciated   c/w wound care as per reccs frequent turning, off loading,and positioning and skin care as per protocol, Maintain pressure injury prevention, Keep skin clean, Offload heels, Monitor wound for changes and notify provider if any   Podiatry following for wound to right foot.    Neurology input appreciated  Started on  Keppra 500 mg BID for abnormal VEEG , monitor level    Wean O2 as tolerated.  Keep SaO2 92 TO 96%.  NIV during sleep  CXR noted lasix 20 iv given 10/27      overall guarded prognosis .

## 2023-10-28 NOTE — PROGRESS NOTE ADULT - SUBJECTIVE AND OBJECTIVE BOX
Over Night Events: events noted, on FNC 50, CXR noted    PHYSICAL EXAM    ICU Vital Signs Last 24 Hrs  T(C): 36.1 (28 Oct 2023 07:33), Max: 36.9 (27 Oct 2023 16:00)  T(F): 97 (28 Oct 2023 07:33), Max: 98.5 (27 Oct 2023 16:00)  HR: 72 (28 Oct 2023 07:33) (61 - 145)  BP: 135/68 (28 Oct 2023 07:33) (101/67 - 162/72)  BP(mean): 76 (27 Oct 2023 11:35) (76 - 80)  RR: 20 (28 Oct 2023 07:33) (18 - 20)  SpO2: 96% (28 Oct 2023 07:33) (94% - 99%)    O2 Parameters below as of 28 Oct 2023 04:29  Patient On (Oxygen Delivery Method): nasal cannula, high flow  O2 Flow (L/min): 50  O2 Concentration (%): 50        General: ill looking  Lungs: dec bs both bases  Cardiovascular: Regular   Abdomen: Soft, Positive BS  Extremities: No clubbing   contracted  not following commands    10-27-23 @ 07:01  -  10-28-23 @ 07:00  --------------------------------------------------------  IN:  Total IN: 0 mL    OUT:    Indwelling Catheter - Urethral (mL): 1600 mL    Intermittent Catheterization - Urethral (mL): 200 mL  Total OUT: 1800 mL    Total NET: -1800 mL          LABS:                          11.6   7.34  )-----------( 463      ( 28 Oct 2023 06:09 )             37.5                                               10-28    144  |  103  |  15  ----------------------------<  140<H>  3.9   |  30  |  0.8    Ca    9.3      28 Oct 2023 06:09  Phos  3.9     10-27  Mg     2.3     10-28    TPro  6.7  /  Alb  3.5  /  TBili  0.4  /  DBili  x   /  AST  31  /  ALT  39  /  AlkPhos  100  10-28                                             Urinalysis Basic - ( 28 Oct 2023 06:09 )    Color: x / Appearance: x / SG: x / pH: x  Gluc: 140 mg/dL / Ketone: x  / Bili: x / Urobili: x   Blood: x / Protein: x / Nitrite: x   Leuk Esterase: x / RBC: x / WBC x   Sq Epi: x / Non Sq Epi: x / Bacteria: x                                                  LIVER FUNCTIONS - ( 28 Oct 2023 06:09 )  Alb: 3.5 g/dL / Pro: 6.7 g/dL / ALK PHOS: 100 U/L / ALT: 39 U/L / AST: 31 U/L / GGT: x                                                                                                                                       MEDICATIONS  (STANDING):  chlorhexidine 2% Cloths 1 Application(s) Topical <User Schedule>  enoxaparin Injectable 50 milliGRAM(s) SubCutaneous every 12 hours  gabapentin 600 milliGRAM(s) Oral daily  levETIRAcetam 500 milliGRAM(s) Oral two times a day  midodrine 10 milliGRAM(s) Oral every 8 hours  multivitamin 1 Tablet(s) Oral daily  pantoprazole   Suspension 40 milliGRAM(s) Oral two times a day  polyethylene glycol 3350 17 Gram(s) Oral daily  raloxifene 60 milliGRAM(s) Oral daily    MEDICATIONS  (PRN):  acetaminophen     Tablet .. 650 milliGRAM(s) Oral every 6 hours PRN Temp greater or equal to 38C (100.4F), Mild Pain (1 - 3)

## 2023-10-28 NOTE — PROGRESS NOTE ADULT - SUBJECTIVE AND OBJECTIVE BOX
T H I S   I S    N O  T   A    F I N A L I Z E D   N O T TEA WILSON  58y, Female  Allergy: No Known Allergies    Hospital Day: 14d    Patient seen and examined earlier today.     PMH/PSH:  PAST MEDICAL & SURGICAL HISTORY:  Down syndrome      Osteoporosis      Mild anemia      Neuropathy      S/P debridement  of R hip on 3/2/21          LAST 24-Hr EVENTS:    VITALS:  T(F): 97.9 (10-28-23 @ 12:18), Max: 98.5 (10-27-23 @ 16:00)  HR: 68 (10-28-23 @ 12:18)  BP: 145/68 (10-28-23 @ 12:18) (102/65 - 162/72)  RR: 20 (10-28-23 @ 12:18)  SpO2: 97% (10-28-23 @ 12:18)          TESTS & MEASUREMENTS:  Weight/BMI      10-26-23 @ 07:01  -  10-27-23 @ 07:00  --------------------------------------------------------  IN: 0 mL / OUT: 1100 mL / NET: -1100 mL    10-27-23 @ 07:01  -  10-28-23 @ 07:00  --------------------------------------------------------  IN: 0 mL / OUT: 1800 mL / NET: -1800 mL                            11.6   7.34  )-----------( 463      ( 28 Oct 2023 06:09 )             37.5         10-28    144  |  103  |  15  ----------------------------<  140<H>  3.9   |  30  |  0.8    Ca    9.3      28 Oct 2023 06:09  Phos  3.9     10-27  Mg     2.3     10-28    TPro  6.7  /  Alb  3.5  /  TBili  0.4  /  DBili  x   /  AST  31  /  ALT  39  /  AlkPhos  100  10-28    LIVER FUNCTIONS - ( 28 Oct 2023 06:09 )  Alb: 3.5 g/dL / Pro: 6.7 g/dL / ALK PHOS: 100 U/L / ALT: 39 U/L / AST: 31 U/L / GGT: x                 Urinalysis Basic - ( 28 Oct 2023 06:09 )    Color: x / Appearance: x / SG: x / pH: x  Gluc: 140 mg/dL / Ketone: x  / Bili: x / Urobili: x   Blood: x / Protein: x / Nitrite: x   Leuk Esterase: x / RBC: x / WBC x   Sq Epi: x / Non Sq Epi: x / Bacteria: x      Procalcitonin, Serum: 0.26 ng/mL (10-24-23 @ 11:00)                    Indwelling Urethral Catheter:     Connect To:  Straight Drainage/[place]    Indication:  Urinary Retention / Obstruction (10-28-23 @ 06:23) (not performed)  Indwelling Urethral Catheter:     Connect To:  Straight Drainage/[place]    Indication:  Urinary Retention / Obstruction (10-27-23 @ 11:45) (not performed)      RADIOLOGY, ECG, & ADDITIONAL TESTS:  12 Lead ECG:   Ventricular Rate 123 BPM    Atrial Rate 123 BPM    P-R Interval 124 ms    QRS Duration 74 ms    Q-T Interval 310 ms    QTC Calculation(Bazett) 443 ms    P Axis 33 degrees    R Axis 27 degrees    T Axis 15 degrees    Diagnosis Line Sinus tachycardia  Nonspecific ST abnormality  Abnormal ECG    Confirmed by caleb roberts ([id number]) on 10/25/2023 2:09:24 PM (10-22-23 @ 18:46)      RECENT DIAGNOSTIC ORDERS:      MEDICATIONS:  MEDICATIONS  (STANDING):  chlorhexidine 2% Cloths 1 Application(s) Topical <User Schedule>  enoxaparin Injectable 50 milliGRAM(s) SubCutaneous every 12 hours  gabapentin 600 milliGRAM(s) Oral daily  levETIRAcetam 500 milliGRAM(s) Oral two times a day  midodrine 10 milliGRAM(s) Oral every 8 hours  multivitamin 1 Tablet(s) Oral daily  pantoprazole   Suspension 40 milliGRAM(s) Oral two times a day  polyethylene glycol 3350 17 Gram(s) Oral daily  raloxifene 60 milliGRAM(s) Oral daily    MEDICATIONS  (PRN):  acetaminophen     Tablet .. 650 milliGRAM(s) Oral every 6 hours PRN Temp greater or equal to 38C (100.4F), Mild Pain (1 - 3)      HOME MEDICATIONS:  gabapentin 600 mg oral tablet (10-14)  melatonin 5 mg oral tablet (10-14)  midodrine 5 mg oral tablet (10-14)  Multiple Vitamins oral tablet (10-14)  Protonix 40 mg oral delayed release tablet (10-14)  raloxifene 60 mg oral tablet (10-14)      PHYSICAL EXAM:  GENERAL:   CHEST/LUNG:   HEART:   ABDOMEN:   EXTREMITIES:               JERICHO TEA  58y, Female  Allergy: No Known Allergies    Hospital Day: 14d    Patient seen and examined earlier today. no events reproted     PMH/PSH:  PAST MEDICAL & SURGICAL HISTORY:  Down syndrome      Osteoporosis      Mild anemia      Neuropathy      S/P debridement  of R hip on 3/2/21          LAST 24-Hr EVENTS:    VITALS:  T(F): 97.9 (10-28-23 @ 12:18), Max: 98.5 (10-27-23 @ 16:00)  HR: 68 (10-28-23 @ 12:18)  BP: 145/68 (10-28-23 @ 12:18) (102/65 - 162/72)  RR: 20 (10-28-23 @ 12:18)  SpO2: 97% (10-28-23 @ 12:18)          TESTS & MEASUREMENTS:  Weight/BMI      10-26-23 @ 07:01  -  10-27-23 @ 07:00  --------------------------------------------------------  IN: 0 mL / OUT: 1100 mL / NET: -1100 mL    10-27-23 @ 07:01  -  10-28-23 @ 07:00  --------------------------------------------------------  IN: 0 mL / OUT: 1800 mL / NET: -1800 mL                            11.6   7.34  )-----------( 463      ( 28 Oct 2023 06:09 )             37.5         10-28    144  |  103  |  15  ----------------------------<  140<H>  3.9   |  30  |  0.8    Ca    9.3      28 Oct 2023 06:09  Phos  3.9     10-27  Mg     2.3     10-28    TPro  6.7  /  Alb  3.5  /  TBili  0.4  /  DBili  x   /  AST  31  /  ALT  39  /  AlkPhos  100  10-28    LIVER FUNCTIONS - ( 28 Oct 2023 06:09 )  Alb: 3.5 g/dL / Pro: 6.7 g/dL / ALK PHOS: 100 U/L / ALT: 39 U/L / AST: 31 U/L / GGT: x                 Urinalysis Basic - ( 28 Oct 2023 06:09 )    Color: x / Appearance: x / SG: x / pH: x  Gluc: 140 mg/dL / Ketone: x  / Bili: x / Urobili: x   Blood: x / Protein: x / Nitrite: x   Leuk Esterase: x / RBC: x / WBC x   Sq Epi: x / Non Sq Epi: x / Bacteria: x      Procalcitonin, Serum: 0.26 ng/mL (10-24-23 @ 11:00)                    Indwelling Urethral Catheter:     Connect To:  Straight Drainage/[place]    Indication:  Urinary Retention / Obstruction (10-28-23 @ 06:23) (not performed)  Indwelling Urethral Catheter:     Connect To:  Straight Drainage/[place]    Indication:  Urinary Retention / Obstruction (10-27-23 @ 11:45) (not performed)      RADIOLOGY, ECG, & ADDITIONAL TESTS:  12 Lead ECG:   Ventricular Rate 123 BPM    Atrial Rate 123 BPM    P-R Interval 124 ms    QRS Duration 74 ms    Q-T Interval 310 ms    QTC Calculation(Bazett) 443 ms    P Axis 33 degrees    R Axis 27 degrees    T Axis 15 degrees    Diagnosis Line Sinus tachycardia  Nonspecific ST abnormality  Abnormal ECG    Confirmed by caleb roberts ([id number]) on 10/25/2023 2:09:24 PM (10-22-23 @ 18:46)      RECENT DIAGNOSTIC ORDERS:      MEDICATIONS:  MEDICATIONS  (STANDING):  chlorhexidine 2% Cloths 1 Application(s) Topical <User Schedule>  enoxaparin Injectable 50 milliGRAM(s) SubCutaneous every 12 hours  gabapentin 600 milliGRAM(s) Oral daily  levETIRAcetam 500 milliGRAM(s) Oral two times a day  midodrine 10 milliGRAM(s) Oral every 8 hours  multivitamin 1 Tablet(s) Oral daily  pantoprazole   Suspension 40 milliGRAM(s) Oral two times a day  polyethylene glycol 3350 17 Gram(s) Oral daily  raloxifene 60 milliGRAM(s) Oral daily    MEDICATIONS  (PRN):  acetaminophen     Tablet .. 650 milliGRAM(s) Oral every 6 hours PRN Temp greater or equal to 38C (100.4F), Mild Pain (1 - 3)      HOME MEDICATIONS:  gabapentin 600 mg oral tablet (10-14)  melatonin 5 mg oral tablet (10-14)  midodrine 5 mg oral tablet (10-14)  Multiple Vitamins oral tablet (10-14)  Protonix 40 mg oral delayed release tablet (10-14)  raloxifene 60 mg oral tablet (10-14)      PHYSICAL EXAM:  On exam  General: awake, non verbal, NAD, chronic ill appearance  Lungs:  crackles b/l, normal resp effort on NC   Heart: regular ryhthm   Abdomen: soft, non tender non distended  Ext: contracted LE , feet dressings

## 2023-10-29 LAB
ALBUMIN SERPL ELPH-MCNC: 3.4 G/DL — LOW (ref 3.5–5.2)
ALBUMIN SERPL ELPH-MCNC: 3.4 G/DL — LOW (ref 3.5–5.2)
ALP SERPL-CCNC: 96 U/L — SIGNIFICANT CHANGE UP (ref 30–115)
ALP SERPL-CCNC: 96 U/L — SIGNIFICANT CHANGE UP (ref 30–115)
ALT FLD-CCNC: 46 U/L — HIGH (ref 0–41)
ALT FLD-CCNC: 46 U/L — HIGH (ref 0–41)
ANION GAP SERPL CALC-SCNC: 12 MMOL/L — SIGNIFICANT CHANGE UP (ref 7–14)
ANION GAP SERPL CALC-SCNC: 12 MMOL/L — SIGNIFICANT CHANGE UP (ref 7–14)
AST SERPL-CCNC: 37 U/L — SIGNIFICANT CHANGE UP (ref 0–41)
AST SERPL-CCNC: 37 U/L — SIGNIFICANT CHANGE UP (ref 0–41)
BASOPHILS # BLD AUTO: 0.07 K/UL — SIGNIFICANT CHANGE UP (ref 0–0.2)
BASOPHILS # BLD AUTO: 0.07 K/UL — SIGNIFICANT CHANGE UP (ref 0–0.2)
BASOPHILS NFR BLD AUTO: 1 % — SIGNIFICANT CHANGE UP (ref 0–1)
BASOPHILS NFR BLD AUTO: 1 % — SIGNIFICANT CHANGE UP (ref 0–1)
BILIRUB SERPL-MCNC: 0.3 MG/DL — SIGNIFICANT CHANGE UP (ref 0.2–1.2)
BILIRUB SERPL-MCNC: 0.3 MG/DL — SIGNIFICANT CHANGE UP (ref 0.2–1.2)
BUN SERPL-MCNC: 20 MG/DL — SIGNIFICANT CHANGE UP (ref 10–20)
BUN SERPL-MCNC: 20 MG/DL — SIGNIFICANT CHANGE UP (ref 10–20)
CALCIUM SERPL-MCNC: 9.2 MG/DL — SIGNIFICANT CHANGE UP (ref 8.4–10.5)
CALCIUM SERPL-MCNC: 9.2 MG/DL — SIGNIFICANT CHANGE UP (ref 8.4–10.5)
CHLORIDE SERPL-SCNC: 102 MMOL/L — SIGNIFICANT CHANGE UP (ref 98–110)
CHLORIDE SERPL-SCNC: 102 MMOL/L — SIGNIFICANT CHANGE UP (ref 98–110)
CO2 SERPL-SCNC: 29 MMOL/L — SIGNIFICANT CHANGE UP (ref 17–32)
CO2 SERPL-SCNC: 29 MMOL/L — SIGNIFICANT CHANGE UP (ref 17–32)
CREAT SERPL-MCNC: 0.8 MG/DL — SIGNIFICANT CHANGE UP (ref 0.7–1.5)
CREAT SERPL-MCNC: 0.8 MG/DL — SIGNIFICANT CHANGE UP (ref 0.7–1.5)
EGFR: 86 ML/MIN/1.73M2 — SIGNIFICANT CHANGE UP
EGFR: 86 ML/MIN/1.73M2 — SIGNIFICANT CHANGE UP
EOSINOPHIL # BLD AUTO: 0.21 K/UL — SIGNIFICANT CHANGE UP (ref 0–0.7)
EOSINOPHIL # BLD AUTO: 0.21 K/UL — SIGNIFICANT CHANGE UP (ref 0–0.7)
EOSINOPHIL NFR BLD AUTO: 3 % — SIGNIFICANT CHANGE UP (ref 0–8)
EOSINOPHIL NFR BLD AUTO: 3 % — SIGNIFICANT CHANGE UP (ref 0–8)
GLUCOSE SERPL-MCNC: 163 MG/DL — HIGH (ref 70–99)
GLUCOSE SERPL-MCNC: 163 MG/DL — HIGH (ref 70–99)
HCT VFR BLD CALC: 36.4 % — LOW (ref 37–47)
HCT VFR BLD CALC: 36.4 % — LOW (ref 37–47)
HGB BLD-MCNC: 11.2 G/DL — LOW (ref 12–16)
HGB BLD-MCNC: 11.2 G/DL — LOW (ref 12–16)
IMM GRANULOCYTES NFR BLD AUTO: 0.3 % — SIGNIFICANT CHANGE UP (ref 0.1–0.3)
IMM GRANULOCYTES NFR BLD AUTO: 0.3 % — SIGNIFICANT CHANGE UP (ref 0.1–0.3)
LYMPHOCYTES # BLD AUTO: 1.63 K/UL — SIGNIFICANT CHANGE UP (ref 1.2–3.4)
LYMPHOCYTES # BLD AUTO: 1.63 K/UL — SIGNIFICANT CHANGE UP (ref 1.2–3.4)
LYMPHOCYTES # BLD AUTO: 23.2 % — SIGNIFICANT CHANGE UP (ref 20.5–51.1)
LYMPHOCYTES # BLD AUTO: 23.2 % — SIGNIFICANT CHANGE UP (ref 20.5–51.1)
MAGNESIUM SERPL-MCNC: 2.4 MG/DL — SIGNIFICANT CHANGE UP (ref 1.8–2.4)
MAGNESIUM SERPL-MCNC: 2.4 MG/DL — SIGNIFICANT CHANGE UP (ref 1.8–2.4)
MCHC RBC-ENTMCNC: 24.2 PG — LOW (ref 27–31)
MCHC RBC-ENTMCNC: 24.2 PG — LOW (ref 27–31)
MCHC RBC-ENTMCNC: 30.8 G/DL — LOW (ref 32–37)
MCHC RBC-ENTMCNC: 30.8 G/DL — LOW (ref 32–37)
MCV RBC AUTO: 78.8 FL — LOW (ref 81–99)
MCV RBC AUTO: 78.8 FL — LOW (ref 81–99)
MONOCYTES # BLD AUTO: 0.56 K/UL — SIGNIFICANT CHANGE UP (ref 0.1–0.6)
MONOCYTES # BLD AUTO: 0.56 K/UL — SIGNIFICANT CHANGE UP (ref 0.1–0.6)
MONOCYTES NFR BLD AUTO: 8 % — SIGNIFICANT CHANGE UP (ref 1.7–9.3)
MONOCYTES NFR BLD AUTO: 8 % — SIGNIFICANT CHANGE UP (ref 1.7–9.3)
NEUTROPHILS # BLD AUTO: 4.53 K/UL — SIGNIFICANT CHANGE UP (ref 1.4–6.5)
NEUTROPHILS # BLD AUTO: 4.53 K/UL — SIGNIFICANT CHANGE UP (ref 1.4–6.5)
NEUTROPHILS NFR BLD AUTO: 64.5 % — SIGNIFICANT CHANGE UP (ref 42.2–75.2)
NEUTROPHILS NFR BLD AUTO: 64.5 % — SIGNIFICANT CHANGE UP (ref 42.2–75.2)
NRBC # BLD: 0 /100 WBCS — SIGNIFICANT CHANGE UP (ref 0–0)
NRBC # BLD: 0 /100 WBCS — SIGNIFICANT CHANGE UP (ref 0–0)
PLATELET # BLD AUTO: 451 K/UL — HIGH (ref 130–400)
PLATELET # BLD AUTO: 451 K/UL — HIGH (ref 130–400)
PMV BLD: 10.1 FL — SIGNIFICANT CHANGE UP (ref 7.4–10.4)
PMV BLD: 10.1 FL — SIGNIFICANT CHANGE UP (ref 7.4–10.4)
POTASSIUM SERPL-MCNC: 4.3 MMOL/L — SIGNIFICANT CHANGE UP (ref 3.5–5)
POTASSIUM SERPL-MCNC: 4.3 MMOL/L — SIGNIFICANT CHANGE UP (ref 3.5–5)
POTASSIUM SERPL-SCNC: 4.3 MMOL/L — SIGNIFICANT CHANGE UP (ref 3.5–5)
POTASSIUM SERPL-SCNC: 4.3 MMOL/L — SIGNIFICANT CHANGE UP (ref 3.5–5)
PROT SERPL-MCNC: 6.7 G/DL — SIGNIFICANT CHANGE UP (ref 6–8)
PROT SERPL-MCNC: 6.7 G/DL — SIGNIFICANT CHANGE UP (ref 6–8)
RBC # BLD: 4.62 M/UL — SIGNIFICANT CHANGE UP (ref 4.2–5.4)
RBC # BLD: 4.62 M/UL — SIGNIFICANT CHANGE UP (ref 4.2–5.4)
RBC # FLD: 28.3 % — HIGH (ref 11.5–14.5)
RBC # FLD: 28.3 % — HIGH (ref 11.5–14.5)
SODIUM SERPL-SCNC: 143 MMOL/L — SIGNIFICANT CHANGE UP (ref 135–146)
SODIUM SERPL-SCNC: 143 MMOL/L — SIGNIFICANT CHANGE UP (ref 135–146)
WBC # BLD: 7.02 K/UL — SIGNIFICANT CHANGE UP (ref 4.8–10.8)
WBC # BLD: 7.02 K/UL — SIGNIFICANT CHANGE UP (ref 4.8–10.8)
WBC # FLD AUTO: 7.02 K/UL — SIGNIFICANT CHANGE UP (ref 4.8–10.8)
WBC # FLD AUTO: 7.02 K/UL — SIGNIFICANT CHANGE UP (ref 4.8–10.8)

## 2023-10-29 PROCEDURE — 99233 SBSQ HOSP IP/OBS HIGH 50: CPT

## 2023-10-29 PROCEDURE — 99232 SBSQ HOSP IP/OBS MODERATE 35: CPT

## 2023-10-29 RX ADMIN — MIDODRINE HYDROCHLORIDE 10 MILLIGRAM(S): 2.5 TABLET ORAL at 16:18

## 2023-10-29 RX ADMIN — MIDODRINE HYDROCHLORIDE 10 MILLIGRAM(S): 2.5 TABLET ORAL at 05:09

## 2023-10-29 RX ADMIN — Medication 1 TABLET(S): at 16:18

## 2023-10-29 RX ADMIN — LEVETIRACETAM 500 MILLIGRAM(S): 250 TABLET, FILM COATED ORAL at 05:09

## 2023-10-29 RX ADMIN — PANTOPRAZOLE SODIUM 40 MILLIGRAM(S): 20 TABLET, DELAYED RELEASE ORAL at 05:09

## 2023-10-29 RX ADMIN — GABAPENTIN 600 MILLIGRAM(S): 400 CAPSULE ORAL at 16:18

## 2023-10-29 RX ADMIN — RALOXIFENE HYDROCHLORIDE 60 MILLIGRAM(S): 60 TABLET, COATED ORAL at 16:19

## 2023-10-29 RX ADMIN — ENOXAPARIN SODIUM 50 MILLIGRAM(S): 100 INJECTION SUBCUTANEOUS at 19:06

## 2023-10-29 RX ADMIN — LEVETIRACETAM 500 MILLIGRAM(S): 250 TABLET, FILM COATED ORAL at 19:06

## 2023-10-29 RX ADMIN — MIDODRINE HYDROCHLORIDE 10 MILLIGRAM(S): 2.5 TABLET ORAL at 21:54

## 2023-10-29 RX ADMIN — CHLORHEXIDINE GLUCONATE 1 APPLICATION(S): 213 SOLUTION TOPICAL at 05:08

## 2023-10-29 RX ADMIN — ENOXAPARIN SODIUM 50 MILLIGRAM(S): 100 INJECTION SUBCUTANEOUS at 05:08

## 2023-10-29 RX ADMIN — PANTOPRAZOLE SODIUM 40 MILLIGRAM(S): 20 TABLET, DELAYED RELEASE ORAL at 19:06

## 2023-10-29 RX ADMIN — POLYETHYLENE GLYCOL 3350 17 GRAM(S): 17 POWDER, FOR SOLUTION ORAL at 16:19

## 2023-10-29 NOTE — PROGRESS NOTE ADULT - ASSESSMENT
IMPRESSION:    Acute hypoxemic respiratory failure sp extubation on HHFNC  Aspiration pneumonia  DVT   Elevated Fungitell   HO recent duodenal perforation   foot OM  HO polymicrobial bacteremia   H/O CP  H/o seizures    PLAN:    CNS: Avoid CNS depressants     HEENT: Oral care.      PULMONARY: HOB at 45 degrees.  Aspiration precaution.  Wean O2 as tolerated.  Keep SaO2 92 TO 96%.  NIV during sleep.  Pulmonary toilet.  repeat CXR noted, low flow trial    CARDIOVASCULAR: Goal directed fluid resuscitation.  Continue Midodrine.     GI: Protonix BID.  Feeding per speech 1:1.  Bowel regimen.       RENAL:  FU lytes.  Correct as needed.      INFECTIOUS DISEASE:  ABX PER ID    HEMATOLOGICAL: DVT therapy.  Monitor CBC. Lovenox therapeutic    ENDOCRINE:  Follow up FS.  Insulin protocol if needed.    MUSCULOSKELETAL: Bedrest.  Off loading.  Wound care.      Prognosis guarded.      floor

## 2023-10-29 NOTE — PROGRESS NOTE ADULT - ASSESSMENT
Patient is a 58 year old female with a PMHx of Down syndrome, nonverbal at baseline, hypothyroidism, cerebral palsy, congenital pulmonary stenosis, Seizures, presents to the ED from nursing facility for syncope the patient was hospitalized from  9/29/2023 -> 10/13/2023 for hemoptysis and duodenal perforation requiring intubation and ICU admission for hypovolemic and septic shock requiring temporary pressor support and treated for multifocal Pna  Patient's hospital course ICU --> Stepdown --> General medical floor. Patient remains hemodynamically stable on general medical, tolerating pureed feeds, and has bowel movements. Patient was discharged on 10/13/2023 , then she came to the  ED 10/14/2023 from nursing facility for syncope associated with tonic-clonic movement witnessed by aide. Initial vitals at nursing home showed bradycardia and hypoxia for about 10-15 minutes. No fevers, chill, cough, vomiting, diarrhea, difficulty breathing.    Pt initialy was admitted to medical floor then on 10/17 was upgraded to ICU fit rest distress and hypotension    Pt downgraded to CEU ,    Acute hypoxemic respiratory failure -  extubated on 10/21 Now on HFNC   Aspiration pneumonia    DVT   Elevated Fungitell   HO recent duodenal perforation   foot OM R  HO polymicrobial bacteremia   H/O CP  H/o seizure  Skin wounds     MR right foot - 1.  Limited exam. 2.  Osteomyelitis of the first metatarsal stump. 3.  Osteomyelitis of the second toe distal phalanx. Podiatry is following, was initially scheduled for OR debridement on 10/20, which was cancelled d/t patient condition.   Podiatry will  reschedule surgery when more stable   ID on board completed course of meropenem and caspo finished   SLP input appreciated ,1:1 feeding   chronic barlow, barlow cath care   Bilateral buttocks, Friction and Moisture Associated Skin Damage  Multiple deep tissue injuries to left foot. Blood filled blisters.   wound care input appreciated   c/w wound care as per reccs frequent turning, off loading,and positioning and skin care as per protocol, Maintain pressure injury prevention, Keep skin clean, Offload heels, Monitor wound for changes and notify provider if any   Podiatry following for wound to right foot.    Neurology input appreciated  Started on  Keppra 500 mg BID for abnormal VEEG , monitor level    Wean O2 as tolerated.  Keep SaO2 92 TO 96%.  NIV during sleep  STILL ON hfnc 50/50   CXR noted lasix 20 iv given 10/27       overall guarded prognosis .

## 2023-10-29 NOTE — PROGRESS NOTE ADULT - SUBJECTIVE AND OBJECTIVE BOX
Over Night Events: events noted, on HHFNC, afebrile    PHYSICAL EXAM    ICU Vital Signs Last 24 Hrs  T(C): 37.1 (29 Oct 2023 04:00), Max: 37.2 (29 Oct 2023 00:00)  T(F): 98.7 (29 Oct 2023 04:00), Max: 98.9 (29 Oct 2023 00:00)  HR: 71 (29 Oct 2023 04:00) (67 - 99)  BP: 134/61 (29 Oct 2023 04:00) (94/51 - 145/75)  BP(mean): 84 (29 Oct 2023 04:00) (67 - 84)  RR: 18 (29 Oct 2023 04:00) (18 - 20)  SpO2: 96% (29 Oct 2023 04:00) (94% - 97%)    O2 Parameters below as of 29 Oct 2023 04:00  Patient On (Oxygen Delivery Method): nasal cannula, high flow            General: ill looking  Lungs: dec bs  both bases  Cardiovascular: Regular   Abdomen: Soft, Positive BS  Extremities: No clubbing   not following commands  contracted      10-28-23 @ 07:01  -  10-29-23 @ 07:00  --------------------------------------------------------  IN:  Total IN: 0 mL    OUT:    Indwelling Catheter - Urethral (mL): 600 mL    Voided (mL): 400 mL  Total OUT: 1000 mL    Total NET: -1000 mL          LABS:                          11.2   7.02  )-----------( 451      ( 29 Oct 2023 06:55 )             36.4                                               10-28    144  |  103  |  15  ----------------------------<  140<H>  3.9   |  30  |  0.8    Ca    9.3      28 Oct 2023 06:09  Mg     2.3     10-28    TPro  6.7  /  Alb  3.5  /  TBili  0.4  /  DBili  x   /  AST  31  /  ALT  39  /  AlkPhos  100  10-28                                             Urinalysis Basic - ( 28 Oct 2023 06:09 )    Color: x / Appearance: x / SG: x / pH: x  Gluc: 140 mg/dL / Ketone: x  / Bili: x / Urobili: x   Blood: x / Protein: x / Nitrite: x   Leuk Esterase: x / RBC: x / WBC x   Sq Epi: x / Non Sq Epi: x / Bacteria: x                                                  LIVER FUNCTIONS - ( 28 Oct 2023 06:09 )  Alb: 3.5 g/dL / Pro: 6.7 g/dL / ALK PHOS: 100 U/L / ALT: 39 U/L / AST: 31 U/L / GGT: x                                                                                                                                       MEDICATIONS  (STANDING):  chlorhexidine 2% Cloths 1 Application(s) Topical <User Schedule>  enoxaparin Injectable 50 milliGRAM(s) SubCutaneous every 12 hours  gabapentin 600 milliGRAM(s) Oral daily  levETIRAcetam 500 milliGRAM(s) Oral two times a day  midodrine 10 milliGRAM(s) Oral every 8 hours  multivitamin 1 Tablet(s) Oral daily  pantoprazole   Suspension 40 milliGRAM(s) Oral two times a day  polyethylene glycol 3350 17 Gram(s) Oral daily  raloxifene 60 milliGRAM(s) Oral daily    MEDICATIONS  (PRN):  acetaminophen     Tablet .. 650 milliGRAM(s) Oral every 6 hours PRN Temp greater or equal to 38C (100.4F), Mild Pain (1 - 3)  melatonin 5 milliGRAM(s) Oral at bedtime PRN Insomnia      Xrays:                                                                                     ECHO

## 2023-10-29 NOTE — PROGRESS NOTE ADULT - SUBJECTIVE AND OBJECTIVE BOX
T H I S   I S    N O  T   A    F I N A L I Z E D   N O T TEA WILSON  58y, Female  Allergy: No Known Allergies    Hospital Day: 15d    Patient seen and examined earlier today.     PMH/PSH:  PAST MEDICAL & SURGICAL HISTORY:  Down syndrome      Osteoporosis      Mild anemia      Neuropathy      S/P debridement  of R hip on 3/2/21          LAST 24-Hr EVENTS:    VITALS:  T(F): 97.4 (10-29-23 @ 08:00), Max: 98.9 (10-29-23 @ 00:00)  HR: 97 (10-29-23 @ 08:00)  BP: 122/58 (10-29-23 @ 08:00) (94/51 - 145/75)  RR: 18 (10-29-23 @ 08:00)  SpO2: 95% (10-29-23 @ 09:30)          TESTS & MEASUREMENTS:  Weight/BMI      10-27-23 @ 07:01  -  10-28-23 @ 07:00  --------------------------------------------------------  IN: 0 mL / OUT: 1800 mL / NET: -1800 mL    10-28-23 @ 07:01  -  10-29-23 @ 07:00  --------------------------------------------------------  IN: 0 mL / OUT: 1000 mL / NET: -1000 mL                            11.2   7.02  )-----------( 451      ( 29 Oct 2023 06:55 )             36.4         10-29    143  |  102  |  20  ----------------------------<  163<H>  4.3   |  29  |  0.8    Ca    9.2      29 Oct 2023 06:55  Mg     2.4     10-29    TPro  6.7  /  Alb  3.4<L>  /  TBili  0.3  /  DBili  x   /  AST  37  /  ALT  46<H>  /  AlkPhos  96  10-29    LIVER FUNCTIONS - ( 29 Oct 2023 06:55 )  Alb: 3.4 g/dL / Pro: 6.7 g/dL / ALK PHOS: 96 U/L / ALT: 46 U/L / AST: 37 U/L / GGT: x                 Urinalysis Basic - ( 29 Oct 2023 06:55 )    Color: x / Appearance: x / SG: x / pH: x  Gluc: 163 mg/dL / Ketone: x  / Bili: x / Urobili: x   Blood: x / Protein: x / Nitrite: x   Leuk Esterase: x / RBC: x / WBC x   Sq Epi: x / Non Sq Epi: x / Bacteria: x      Procalcitonin, Serum: 0.26 ng/mL (10-24-23 @ 11:00)                    Indwelling Urethral Catheter:     Connect To:  Straight Drainage/[place]    Indication:  Urinary Retention / Obstruction (10-29-23 @ 00:02) (not performed)  Indwelling Urethral Catheter:     Connect To:  Straight Drainage/[place]    Indication:  Urinary Retention / Obstruction (10-28-23 @ 06:23) (not performed)      RADIOLOGY, ECG, & ADDITIONAL TESTS:  12 Lead ECG:   Ventricular Rate 123 BPM    Atrial Rate 123 BPM    P-R Interval 124 ms    QRS Duration 74 ms    Q-T Interval 310 ms    QTC Calculation(Bazett) 443 ms    P Axis 33 degrees    R Axis 27 degrees    T Axis 15 degrees    Diagnosis Line Sinus tachycardia  Nonspecific ST abnormality  Abnormal ECG    Confirmed by caleb roberts ([id number]) on 10/25/2023 2:09:24 PM (10-22-23 @ 18:46)      RECENT DIAGNOSTIC ORDERS:      MEDICATIONS:  MEDICATIONS  (STANDING):  chlorhexidine 2% Cloths 1 Application(s) Topical <User Schedule>  enoxaparin Injectable 50 milliGRAM(s) SubCutaneous every 12 hours  gabapentin 600 milliGRAM(s) Oral daily  levETIRAcetam 500 milliGRAM(s) Oral two times a day  midodrine 10 milliGRAM(s) Oral every 8 hours  multivitamin 1 Tablet(s) Oral daily  pantoprazole   Suspension 40 milliGRAM(s) Oral two times a day  polyethylene glycol 3350 17 Gram(s) Oral daily  raloxifene 60 milliGRAM(s) Oral daily    MEDICATIONS  (PRN):  acetaminophen     Tablet .. 650 milliGRAM(s) Oral every 6 hours PRN Temp greater or equal to 38C (100.4F), Mild Pain (1 - 3)  melatonin 5 milliGRAM(s) Oral at bedtime PRN Insomnia      HOME MEDICATIONS:  gabapentin 600 mg oral tablet (10-14)  melatonin 5 mg oral tablet (10-14)  midodrine 5 mg oral tablet (10-14)  Multiple Vitamins oral tablet (10-14)  Protonix 40 mg oral delayed release tablet (10-14)  raloxifene 60 mg oral tablet (10-14)      PHYSICAL EXAM:  GENERAL:   CHEST/LUNG:   HEART:   ABDOMEN:   EXTREMITIES:               TEA ECHAVARRIA  58y, Female  Allergy: No Known Allergies    Hospital Day: 15d    Patient seen and examined earlier today. still on HFNC     PMH/PSH:  PAST MEDICAL & SURGICAL HISTORY:  Down syndrome      Osteoporosis      Mild anemia      Neuropathy      S/P debridement  of R hip on 3/2/21          LAST 24-Hr EVENTS:    VITALS:  T(F): 97.4 (10-29-23 @ 08:00), Max: 98.9 (10-29-23 @ 00:00)  HR: 97 (10-29-23 @ 08:00)  BP: 122/58 (10-29-23 @ 08:00) (94/51 - 145/75)  RR: 18 (10-29-23 @ 08:00)  SpO2: 95% (10-29-23 @ 09:30)          TESTS & MEASUREMENTS:  Weight/BMI      10-27-23 @ 07:01  -  10-28-23 @ 07:00  --------------------------------------------------------  IN: 0 mL / OUT: 1800 mL / NET: -1800 mL    10-28-23 @ 07:01  -  10-29-23 @ 07:00  --------------------------------------------------------  IN: 0 mL / OUT: 1000 mL / NET: -1000 mL                            11.2   7.02  )-----------( 451      ( 29 Oct 2023 06:55 )             36.4         10-29    143  |  102  |  20  ----------------------------<  163<H>  4.3   |  29  |  0.8    Ca    9.2      29 Oct 2023 06:55  Mg     2.4     10-29    TPro  6.7  /  Alb  3.4<L>  /  TBili  0.3  /  DBili  x   /  AST  37  /  ALT  46<H>  /  AlkPhos  96  10-29    LIVER FUNCTIONS - ( 29 Oct 2023 06:55 )  Alb: 3.4 g/dL / Pro: 6.7 g/dL / ALK PHOS: 96 U/L / ALT: 46 U/L / AST: 37 U/L / GGT: x                 Urinalysis Basic - ( 29 Oct 2023 06:55 )    Color: x / Appearance: x / SG: x / pH: x  Gluc: 163 mg/dL / Ketone: x  / Bili: x / Urobili: x   Blood: x / Protein: x / Nitrite: x   Leuk Esterase: x / RBC: x / WBC x   Sq Epi: x / Non Sq Epi: x / Bacteria: x      Procalcitonin, Serum: 0.26 ng/mL (10-24-23 @ 11:00)                    Indwelling Urethral Catheter:     Connect To:  Straight Drainage/[place]    Indication:  Urinary Retention / Obstruction (10-29-23 @ 00:02) (not performed)  Indwelling Urethral Catheter:     Connect To:  Straight Drainage/[place]    Indication:  Urinary Retention / Obstruction (10-28-23 @ 06:23) (not performed)      RADIOLOGY, ECG, & ADDITIONAL TESTS:  12 Lead ECG:   Ventricular Rate 123 BPM    Atrial Rate 123 BPM    P-R Interval 124 ms    QRS Duration 74 ms    Q-T Interval 310 ms    QTC Calculation(Bazett) 443 ms    P Axis 33 degrees    R Axis 27 degrees    T Axis 15 degrees    Diagnosis Line Sinus tachycardia  Nonspecific ST abnormality  Abnormal ECG    Confirmed by caleb roberts ([id number]) on 10/25/2023 2:09:24 PM (10-22-23 @ 18:46)      RECENT DIAGNOSTIC ORDERS:      MEDICATIONS:  MEDICATIONS  (STANDING):  chlorhexidine 2% Cloths 1 Application(s) Topical <User Schedule>  enoxaparin Injectable 50 milliGRAM(s) SubCutaneous every 12 hours  gabapentin 600 milliGRAM(s) Oral daily  levETIRAcetam 500 milliGRAM(s) Oral two times a day  midodrine 10 milliGRAM(s) Oral every 8 hours  multivitamin 1 Tablet(s) Oral daily  pantoprazole   Suspension 40 milliGRAM(s) Oral two times a day  polyethylene glycol 3350 17 Gram(s) Oral daily  raloxifene 60 milliGRAM(s) Oral daily    MEDICATIONS  (PRN):  acetaminophen     Tablet .. 650 milliGRAM(s) Oral every 6 hours PRN Temp greater or equal to 38C (100.4F), Mild Pain (1 - 3)  melatonin 5 milliGRAM(s) Oral at bedtime PRN Insomnia      HOME MEDICATIONS:  gabapentin 600 mg oral tablet (10-14)  melatonin 5 mg oral tablet (10-14)  midodrine 5 mg oral tablet (10-14)  Multiple Vitamins oral tablet (10-14)  Protonix 40 mg oral delayed release tablet (10-14)  raloxifene 60 mg oral tablet (10-14)      PHYSICAL EXAM:  General: awake, non verbal, NAD, chronic ill appearance  Lungs:  crackles b/l, normal resp effort on NC   Heart: regular ryhthm   Abdomen: soft, non tender non distended  Ext: contracted LE , feet dressings

## 2023-10-29 NOTE — PROGRESS NOTE ADULT - SUBJECTIVE AND OBJECTIVE BOX
24H events:    Patient is a 58y old Female who presents with a chief complaint of Pre-syncope (29 Oct 2023 11:17)    Primary diagnosis of Pre-syncope  Today is hospital day 15d. This morning patient was seen and examined at bedside, resting comfortably in bed.    No acute or major events overnight.    Code Status:    Family communication:  Contact date:  Name of person contacted:  Relationship to patient:  Communication details:  What matters most:    PAST MEDICAL & SURGICAL HISTORY  Down syndrome    Osteoporosis    Mild anemia    Neuropathy    S/P debridement  of R hip on 3/2/21      SOCIAL HISTORY:  Social History:  Lives at nursing home (14 Oct 2023 20:33)      ALLERGIES:  No Known Allergies    MEDICATIONS:  STANDING MEDICATIONS  chlorhexidine 2% Cloths 1 Application(s) Topical <User Schedule>  enoxaparin Injectable 50 milliGRAM(s) SubCutaneous every 12 hours  gabapentin 600 milliGRAM(s) Oral daily  levETIRAcetam 500 milliGRAM(s) Oral two times a day  midodrine 10 milliGRAM(s) Oral every 8 hours  multivitamin 1 Tablet(s) Oral daily  pantoprazole   Suspension 40 milliGRAM(s) Oral two times a day  polyethylene glycol 3350 17 Gram(s) Oral daily  raloxifene 60 milliGRAM(s) Oral daily    PRN MEDICATIONS  acetaminophen     Tablet .. 650 milliGRAM(s) Oral every 6 hours PRN  melatonin 5 milliGRAM(s) Oral at bedtime PRN    VITALS:   T(F): 97.8  HR: 80  BP: 110/68  RR: 20  SpO2: 98%    PHYSICAL EXAM:      GENERAL: NAD, lying in bed comfortably. on HFNC. nor verbal   HEAD:  Atraumatic, normocephalic  EYES: EOMI, PERRLA   ENT: Moist mucous membranes  NECK: Supple  HEART: Regular rate and rhythm, no murmurs  LUNGS: Unlabored respirations.  Clear to auscultation bilaterally  ABDOMEN: Soft, nontender, nondistended  EXTREMITIES: 2+ peripheral pulses bilaterally  NERVOUS SYSTEM:  awake, alert, does not follow commands          (  ) Indwelling Madsen Catheter:   Date insterted:    Reason (  ) Critical illness     (  ) urinary retention    (  ) Accurate Ins/Outs Monitoring     (  ) CMO patient    (  ) Central Line:   Date inserted:  Location: (  ) Right IJ     (  ) Left IJ     (  ) Right Fem     (  ) Left Fem    (  ) SPC        (  ) pigtail       (  ) PEG tube       (  ) colostomy       (  ) jejunostomy  (  ) U-Dall    LABS:                        11.2   7.02  )-----------( 451      ( 29 Oct 2023 06:55 )             36.4     10-29    143  |  102  |  20  ----------------------------<  163<H>  4.3   |  29  |  0.8    Ca    9.2      29 Oct 2023 06:55  Mg     2.4     10-29    TPro  6.7  /  Alb  3.4<L>  /  TBili  0.3  /  DBili  x   /  AST  37  /  ALT  46<H>  /  AlkPhos  96  10-29      Urinalysis Basic - ( 29 Oct 2023 06:55 )    Color: x / Appearance: x / SG: x / pH: x  Gluc: 163 mg/dL / Ketone: x  / Bili: x / Urobili: x   Blood: x / Protein: x / Nitrite: x   Leuk Esterase: x / RBC: x / WBC x   Sq Epi: x / Non Sq Epi: x / Bacteria: x                RADIOLOGY:

## 2023-10-30 LAB
ALBUMIN SERPL ELPH-MCNC: 3.3 G/DL — LOW (ref 3.5–5.2)
ALBUMIN SERPL ELPH-MCNC: 3.3 G/DL — LOW (ref 3.5–5.2)
ALP SERPL-CCNC: 94 U/L — SIGNIFICANT CHANGE UP (ref 30–115)
ALP SERPL-CCNC: 94 U/L — SIGNIFICANT CHANGE UP (ref 30–115)
ALT FLD-CCNC: 43 U/L — HIGH (ref 0–41)
ALT FLD-CCNC: 43 U/L — HIGH (ref 0–41)
ANION GAP SERPL CALC-SCNC: 10 MMOL/L — SIGNIFICANT CHANGE UP (ref 7–14)
ANION GAP SERPL CALC-SCNC: 10 MMOL/L — SIGNIFICANT CHANGE UP (ref 7–14)
AST SERPL-CCNC: 29 U/L — SIGNIFICANT CHANGE UP (ref 0–41)
AST SERPL-CCNC: 29 U/L — SIGNIFICANT CHANGE UP (ref 0–41)
BASOPHILS # BLD AUTO: 0.09 K/UL — SIGNIFICANT CHANGE UP (ref 0–0.2)
BASOPHILS # BLD AUTO: 0.09 K/UL — SIGNIFICANT CHANGE UP (ref 0–0.2)
BASOPHILS NFR BLD AUTO: 1.2 % — HIGH (ref 0–1)
BASOPHILS NFR BLD AUTO: 1.2 % — HIGH (ref 0–1)
BILIRUB SERPL-MCNC: 0.3 MG/DL — SIGNIFICANT CHANGE UP (ref 0.2–1.2)
BILIRUB SERPL-MCNC: 0.3 MG/DL — SIGNIFICANT CHANGE UP (ref 0.2–1.2)
BUN SERPL-MCNC: 21 MG/DL — HIGH (ref 10–20)
BUN SERPL-MCNC: 21 MG/DL — HIGH (ref 10–20)
CALCIUM SERPL-MCNC: 9.1 MG/DL — SIGNIFICANT CHANGE UP (ref 8.4–10.5)
CALCIUM SERPL-MCNC: 9.1 MG/DL — SIGNIFICANT CHANGE UP (ref 8.4–10.5)
CHLORIDE SERPL-SCNC: 102 MMOL/L — SIGNIFICANT CHANGE UP (ref 98–110)
CHLORIDE SERPL-SCNC: 102 MMOL/L — SIGNIFICANT CHANGE UP (ref 98–110)
CO2 SERPL-SCNC: 28 MMOL/L — SIGNIFICANT CHANGE UP (ref 17–32)
CO2 SERPL-SCNC: 28 MMOL/L — SIGNIFICANT CHANGE UP (ref 17–32)
CREAT SERPL-MCNC: 0.8 MG/DL — SIGNIFICANT CHANGE UP (ref 0.7–1.5)
CREAT SERPL-MCNC: 0.8 MG/DL — SIGNIFICANT CHANGE UP (ref 0.7–1.5)
EGFR: 86 ML/MIN/1.73M2 — SIGNIFICANT CHANGE UP
EGFR: 86 ML/MIN/1.73M2 — SIGNIFICANT CHANGE UP
EOSINOPHIL # BLD AUTO: 0.33 K/UL — SIGNIFICANT CHANGE UP (ref 0–0.7)
EOSINOPHIL # BLD AUTO: 0.33 K/UL — SIGNIFICANT CHANGE UP (ref 0–0.7)
EOSINOPHIL NFR BLD AUTO: 4.3 % — SIGNIFICANT CHANGE UP (ref 0–8)
EOSINOPHIL NFR BLD AUTO: 4.3 % — SIGNIFICANT CHANGE UP (ref 0–8)
GLUCOSE SERPL-MCNC: 145 MG/DL — HIGH (ref 70–99)
GLUCOSE SERPL-MCNC: 145 MG/DL — HIGH (ref 70–99)
HCT VFR BLD CALC: 36.8 % — LOW (ref 37–47)
HCT VFR BLD CALC: 36.8 % — LOW (ref 37–47)
HGB BLD-MCNC: 11.2 G/DL — LOW (ref 12–16)
HGB BLD-MCNC: 11.2 G/DL — LOW (ref 12–16)
IMM GRANULOCYTES NFR BLD AUTO: 0.4 % — HIGH (ref 0.1–0.3)
IMM GRANULOCYTES NFR BLD AUTO: 0.4 % — HIGH (ref 0.1–0.3)
LEVETIRACETAM SERPL-MCNC: 20.1 UG/ML — SIGNIFICANT CHANGE UP (ref 10–40)
LEVETIRACETAM SERPL-MCNC: 20.1 UG/ML — SIGNIFICANT CHANGE UP (ref 10–40)
LYMPHOCYTES # BLD AUTO: 1.44 K/UL — SIGNIFICANT CHANGE UP (ref 1.2–3.4)
LYMPHOCYTES # BLD AUTO: 1.44 K/UL — SIGNIFICANT CHANGE UP (ref 1.2–3.4)
LYMPHOCYTES # BLD AUTO: 18.9 % — LOW (ref 20.5–51.1)
LYMPHOCYTES # BLD AUTO: 18.9 % — LOW (ref 20.5–51.1)
MAGNESIUM SERPL-MCNC: 2.2 MG/DL — SIGNIFICANT CHANGE UP (ref 1.8–2.4)
MAGNESIUM SERPL-MCNC: 2.2 MG/DL — SIGNIFICANT CHANGE UP (ref 1.8–2.4)
MCHC RBC-ENTMCNC: 24.1 PG — LOW (ref 27–31)
MCHC RBC-ENTMCNC: 24.1 PG — LOW (ref 27–31)
MCHC RBC-ENTMCNC: 30.4 G/DL — LOW (ref 32–37)
MCHC RBC-ENTMCNC: 30.4 G/DL — LOW (ref 32–37)
MCV RBC AUTO: 79.3 FL — LOW (ref 81–99)
MCV RBC AUTO: 79.3 FL — LOW (ref 81–99)
MONOCYTES # BLD AUTO: 0.66 K/UL — HIGH (ref 0.1–0.6)
MONOCYTES # BLD AUTO: 0.66 K/UL — HIGH (ref 0.1–0.6)
MONOCYTES NFR BLD AUTO: 8.7 % — SIGNIFICANT CHANGE UP (ref 1.7–9.3)
MONOCYTES NFR BLD AUTO: 8.7 % — SIGNIFICANT CHANGE UP (ref 1.7–9.3)
NEUTROPHILS # BLD AUTO: 5.05 K/UL — SIGNIFICANT CHANGE UP (ref 1.4–6.5)
NEUTROPHILS # BLD AUTO: 5.05 K/UL — SIGNIFICANT CHANGE UP (ref 1.4–6.5)
NEUTROPHILS NFR BLD AUTO: 66.5 % — SIGNIFICANT CHANGE UP (ref 42.2–75.2)
NEUTROPHILS NFR BLD AUTO: 66.5 % — SIGNIFICANT CHANGE UP (ref 42.2–75.2)
NRBC # BLD: 0 /100 WBCS — SIGNIFICANT CHANGE UP (ref 0–0)
NRBC # BLD: 0 /100 WBCS — SIGNIFICANT CHANGE UP (ref 0–0)
PLATELET # BLD AUTO: 433 K/UL — HIGH (ref 130–400)
PLATELET # BLD AUTO: 433 K/UL — HIGH (ref 130–400)
PMV BLD: 10 FL — SIGNIFICANT CHANGE UP (ref 7.4–10.4)
PMV BLD: 10 FL — SIGNIFICANT CHANGE UP (ref 7.4–10.4)
POTASSIUM SERPL-MCNC: 4.1 MMOL/L — SIGNIFICANT CHANGE UP (ref 3.5–5)
POTASSIUM SERPL-MCNC: 4.1 MMOL/L — SIGNIFICANT CHANGE UP (ref 3.5–5)
POTASSIUM SERPL-SCNC: 4.1 MMOL/L — SIGNIFICANT CHANGE UP (ref 3.5–5)
POTASSIUM SERPL-SCNC: 4.1 MMOL/L — SIGNIFICANT CHANGE UP (ref 3.5–5)
PROT SERPL-MCNC: 6.8 G/DL — SIGNIFICANT CHANGE UP (ref 6–8)
PROT SERPL-MCNC: 6.8 G/DL — SIGNIFICANT CHANGE UP (ref 6–8)
RBC # BLD: 4.64 M/UL — SIGNIFICANT CHANGE UP (ref 4.2–5.4)
RBC # BLD: 4.64 M/UL — SIGNIFICANT CHANGE UP (ref 4.2–5.4)
RBC # FLD: SIGNIFICANT CHANGE UP % (ref 11.5–14.5)
RBC # FLD: SIGNIFICANT CHANGE UP % (ref 11.5–14.5)
SODIUM SERPL-SCNC: 140 MMOL/L — SIGNIFICANT CHANGE UP (ref 135–146)
SODIUM SERPL-SCNC: 140 MMOL/L — SIGNIFICANT CHANGE UP (ref 135–146)
WBC # BLD: 7.6 K/UL — SIGNIFICANT CHANGE UP (ref 4.8–10.8)
WBC # BLD: 7.6 K/UL — SIGNIFICANT CHANGE UP (ref 4.8–10.8)
WBC # FLD AUTO: 7.6 K/UL — SIGNIFICANT CHANGE UP (ref 4.8–10.8)
WBC # FLD AUTO: 7.6 K/UL — SIGNIFICANT CHANGE UP (ref 4.8–10.8)

## 2023-10-30 PROCEDURE — 99232 SBSQ HOSP IP/OBS MODERATE 35: CPT

## 2023-10-30 PROCEDURE — 99233 SBSQ HOSP IP/OBS HIGH 50: CPT

## 2023-10-30 PROCEDURE — 71045 X-RAY EXAM CHEST 1 VIEW: CPT | Mod: 26

## 2023-10-30 RX ORDER — ENOXAPARIN SODIUM 100 MG/ML
55 INJECTION SUBCUTANEOUS EVERY 12 HOURS
Refills: 0 | Status: DISCONTINUED | OUTPATIENT
Start: 2023-10-30 | End: 2023-11-08

## 2023-10-30 RX ADMIN — PANTOPRAZOLE SODIUM 40 MILLIGRAM(S): 20 TABLET, DELAYED RELEASE ORAL at 05:16

## 2023-10-30 RX ADMIN — POLYETHYLENE GLYCOL 3350 17 GRAM(S): 17 POWDER, FOR SOLUTION ORAL at 11:33

## 2023-10-30 RX ADMIN — MIDODRINE HYDROCHLORIDE 10 MILLIGRAM(S): 2.5 TABLET ORAL at 05:16

## 2023-10-30 RX ADMIN — LEVETIRACETAM 500 MILLIGRAM(S): 250 TABLET, FILM COATED ORAL at 17:51

## 2023-10-30 RX ADMIN — CHLORHEXIDINE GLUCONATE 1 APPLICATION(S): 213 SOLUTION TOPICAL at 05:15

## 2023-10-30 RX ADMIN — GABAPENTIN 600 MILLIGRAM(S): 400 CAPSULE ORAL at 11:33

## 2023-10-30 RX ADMIN — ENOXAPARIN SODIUM 50 MILLIGRAM(S): 100 INJECTION SUBCUTANEOUS at 05:16

## 2023-10-30 RX ADMIN — RALOXIFENE HYDROCHLORIDE 60 MILLIGRAM(S): 60 TABLET, COATED ORAL at 11:30

## 2023-10-30 RX ADMIN — ENOXAPARIN SODIUM 55 MILLIGRAM(S): 100 INJECTION SUBCUTANEOUS at 17:50

## 2023-10-30 RX ADMIN — PANTOPRAZOLE SODIUM 40 MILLIGRAM(S): 20 TABLET, DELAYED RELEASE ORAL at 17:51

## 2023-10-30 RX ADMIN — MIDODRINE HYDROCHLORIDE 10 MILLIGRAM(S): 2.5 TABLET ORAL at 14:54

## 2023-10-30 RX ADMIN — LEVETIRACETAM 500 MILLIGRAM(S): 250 TABLET, FILM COATED ORAL at 05:16

## 2023-10-30 RX ADMIN — Medication 1 TABLET(S): at 11:31

## 2023-10-30 NOTE — PROGRESS NOTE ADULT - SUBJECTIVE AND OBJECTIVE BOX
T H I S   I S    N O  T   A    F I N A L I Z E D   N O T TEA WILSON  58y, Female  Allergy: No Known Allergies    Hospital Day: 16d    Patient seen and examined earlier today.     PMH/PSH:  PAST MEDICAL & SURGICAL HISTORY:  Down syndrome      Osteoporosis      Mild anemia      Neuropathy      S/P debridement  of R hip on 3/2/21          LAST 24-Hr EVENTS:    VITALS:  T(F): 97.2 (10-30-23 @ 11:15), Max: 97.9 (10-29-23 @ 20:50)  HR: 63 (10-30-23 @ 11:15)  BP: 98/51 (10-30-23 @ 11:15) (91/52 - 106/58)  RR: 20 (10-30-23 @ 11:15)  SpO2: 91% (10-30-23 @ 11:15)          TESTS & MEASUREMENTS:  Weight/BMI      10-28-23 @ 07:01  -  10-29-23 @ 07:00  --------------------------------------------------------  IN: 0 mL / OUT: 1000 mL / NET: -1000 mL    10-29-23 @ 07:01  -  10-30-23 @ 07:00  --------------------------------------------------------  IN: 0 mL / OUT: 245 mL / NET: -245 mL    10-30-23 @ 07:01  -  10-30-23 @ 13:53  --------------------------------------------------------  IN: 330 mL / OUT: 0 mL / NET: 330 mL                            11.2   7.60  )-----------( 433      ( 30 Oct 2023 05:57 )             36.8         10-30    140  |  102  |  21<H>  ----------------------------<  145<H>  4.1   |  28  |  0.8    Ca    9.1      30 Oct 2023 05:57  Mg     2.2     10-30    TPro  6.8  /  Alb  3.3<L>  /  TBili  0.3  /  DBili  x   /  AST  29  /  ALT  43<H>  /  AlkPhos  94  10-30    LIVER FUNCTIONS - ( 30 Oct 2023 05:57 )  Alb: 3.3 g/dL / Pro: 6.8 g/dL / ALK PHOS: 94 U/L / ALT: 43 U/L / AST: 29 U/L / GGT: x                 Urinalysis Basic - ( 30 Oct 2023 05:57 )    Color: x / Appearance: x / SG: x / pH: x  Gluc: 145 mg/dL / Ketone: x  / Bili: x / Urobili: x   Blood: x / Protein: x / Nitrite: x   Leuk Esterase: x / RBC: x / WBC x   Sq Epi: x / Non Sq Epi: x / Bacteria: x      Procalcitonin, Serum: 0.26 ng/mL (10-24-23 @ 11:00)                    Indwelling Urethral Catheter:     Connect To:  Straight Drainage/[place]    Indication:  Urinary Retention / Obstruction (10-30-23 @ 13:17) (not performed)  Indwelling Urethral Catheter:     Connect To:  Straight Drainage/[place]    Indication:  Urinary Retention / Obstruction (10-29-23 @ 00:02) (not performed)      RADIOLOGY, ECG, & ADDITIONAL TESTS:  12 Lead ECG:   Ventricular Rate 123 BPM    Atrial Rate 123 BPM    P-R Interval 124 ms    QRS Duration 74 ms    Q-T Interval 310 ms    QTC Calculation(Bazett) 443 ms    P Axis 33 degrees    R Axis 27 degrees    T Axis 15 degrees    Diagnosis Line Sinus tachycardia  Nonspecific ST abnormality  Abnormal ECG    Confirmed by caleb roberts ([id number]) on 10/25/2023 2:09:24 PM (10-22-23 @ 18:46)      RECENT DIAGNOSTIC ORDERS:  Magnesium: AM Sched. Collection: 31-Oct-2023 04:30 (10-30-23 @ 11:41)  Comprehensive Metabolic Panel: AM Sched. Collection: 31-Oct-2023 04:30 (10-30-23 @ 11:41)  Complete Blood Count + Automated Diff: AM Sched. Collection: 31-Oct-2023 04:30 (10-30-23 @ 11:41)      MEDICATIONS:  MEDICATIONS  (STANDING):  chlorhexidine 2% Cloths 1 Application(s) Topical <User Schedule>  enoxaparin Injectable 50 milliGRAM(s) SubCutaneous every 12 hours  gabapentin 600 milliGRAM(s) Oral daily  levETIRAcetam 500 milliGRAM(s) Oral two times a day  midodrine 10 milliGRAM(s) Oral every 8 hours  multivitamin 1 Tablet(s) Oral daily  pantoprazole   Suspension 40 milliGRAM(s) Oral two times a day  polyethylene glycol 3350 17 Gram(s) Oral daily  raloxifene 60 milliGRAM(s) Oral daily    MEDICATIONS  (PRN):  acetaminophen     Tablet .. 650 milliGRAM(s) Oral every 6 hours PRN Temp greater or equal to 38C (100.4F), Mild Pain (1 - 3)  melatonin 5 milliGRAM(s) Oral at bedtime PRN Insomnia      HOME MEDICATIONS:  gabapentin 600 mg oral tablet (10-14)  melatonin 5 mg oral tablet (10-14)  midodrine 5 mg oral tablet (10-14)  Multiple Vitamins oral tablet (10-14)  Protonix 40 mg oral delayed release tablet (10-14)  raloxifene 60 mg oral tablet (10-14)      PHYSICAL EXAM:  GENERAL:   CHEST/LUNG:   HEART:   ABDOMEN:   EXTREMITIES:               TEA ECHAVARRIA  58y, Female  Allergy: No Known Allergies    Hospital Day: 16d    Patient seen and examined earlier today.     PMH/PSH:  PAST MEDICAL & SURGICAL HISTORY:  Down syndrome      Osteoporosis      Mild anemia      Neuropathy      S/P debridement  of R hip on 3/2/21          LAST 24-Hr EVENTS:    VITALS:  T(F): 97.2 (10-30-23 @ 11:15), Max: 97.9 (10-29-23 @ 20:50)  HR: 63 (10-30-23 @ 11:15)  BP: 98/51 (10-30-23 @ 11:15) (91/52 - 106/58)  RR: 20 (10-30-23 @ 11:15)  SpO2: 91% (10-30-23 @ 11:15)          TESTS & MEASUREMENTS:  Weight/BMI      10-28-23 @ 07:01  -  10-29-23 @ 07:00  --------------------------------------------------------  IN: 0 mL / OUT: 1000 mL / NET: -1000 mL    10-29-23 @ 07:01  -  10-30-23 @ 07:00  --------------------------------------------------------  IN: 0 mL / OUT: 245 mL / NET: -245 mL    10-30-23 @ 07:01  -  10-30-23 @ 13:53  --------------------------------------------------------  IN: 330 mL / OUT: 0 mL / NET: 330 mL                            11.2   7.60  )-----------( 433      ( 30 Oct 2023 05:57 )             36.8         10-30    140  |  102  |  21<H>  ----------------------------<  145<H>  4.1   |  28  |  0.8    Ca    9.1      30 Oct 2023 05:57  Mg     2.2     10-30    TPro  6.8  /  Alb  3.3<L>  /  TBili  0.3  /  DBili  x   /  AST  29  /  ALT  43<H>  /  AlkPhos  94  10-30    LIVER FUNCTIONS - ( 30 Oct 2023 05:57 )  Alb: 3.3 g/dL / Pro: 6.8 g/dL / ALK PHOS: 94 U/L / ALT: 43 U/L / AST: 29 U/L / GGT: x                 Urinalysis Basic - ( 30 Oct 2023 05:57 )    Color: x / Appearance: x / SG: x / pH: x  Gluc: 145 mg/dL / Ketone: x  / Bili: x / Urobili: x   Blood: x / Protein: x / Nitrite: x   Leuk Esterase: x / RBC: x / WBC x   Sq Epi: x / Non Sq Epi: x / Bacteria: x      Procalcitonin, Serum: 0.26 ng/mL (10-24-23 @ 11:00)                    Indwelling Urethral Catheter:     Connect To:  Straight Drainage/[place]    Indication:  Urinary Retention / Obstruction (10-30-23 @ 13:17) (not performed)  Indwelling Urethral Catheter:     Connect To:  Straight Drainage/[place]    Indication:  Urinary Retention / Obstruction (10-29-23 @ 00:02) (not performed)      RADIOLOGY, ECG, & ADDITIONAL TESTS:  12 Lead ECG:   Ventricular Rate 123 BPM    Atrial Rate 123 BPM    P-R Interval 124 ms    QRS Duration 74 ms    Q-T Interval 310 ms    QTC Calculation(Bazett) 443 ms    P Axis 33 degrees    R Axis 27 degrees    T Axis 15 degrees    Diagnosis Line Sinus tachycardia  Nonspecific ST abnormality  Abnormal ECG    Confirmed by caleb roberts ([id number]) on 10/25/2023 2:09:24 PM (10-22-23 @ 18:46)      RECENT DIAGNOSTIC ORDERS:  Magnesium: AM Sched. Collection: 31-Oct-2023 04:30 (10-30-23 @ 11:41)  Comprehensive Metabolic Panel: AM Sched. Collection: 31-Oct-2023 04:30 (10-30-23 @ 11:41)  Complete Blood Count + Automated Diff: AM Sched. Collection: 31-Oct-2023 04:30 (10-30-23 @ 11:41)      MEDICATIONS:  MEDICATIONS  (STANDING):  chlorhexidine 2% Cloths 1 Application(s) Topical <User Schedule>  enoxaparin Injectable 50 milliGRAM(s) SubCutaneous every 12 hours  gabapentin 600 milliGRAM(s) Oral daily  levETIRAcetam 500 milliGRAM(s) Oral two times a day  midodrine 10 milliGRAM(s) Oral every 8 hours  multivitamin 1 Tablet(s) Oral daily  pantoprazole   Suspension 40 milliGRAM(s) Oral two times a day  polyethylene glycol 3350 17 Gram(s) Oral daily  raloxifene 60 milliGRAM(s) Oral daily    MEDICATIONS  (PRN):  acetaminophen     Tablet .. 650 milliGRAM(s) Oral every 6 hours PRN Temp greater or equal to 38C (100.4F), Mild Pain (1 - 3)  melatonin 5 milliGRAM(s) Oral at bedtime PRN Insomnia      HOME MEDICATIONS:  gabapentin 600 mg oral tablet (10-14)  melatonin 5 mg oral tablet (10-14)  midodrine 5 mg oral tablet (10-14)  Multiple Vitamins oral tablet (10-14)  Protonix 40 mg oral delayed release tablet (10-14)  raloxifene 60 mg oral tablet (10-14)      PHYSICAL EXAM:  GENERAL:   CHEST/LUNG:   HEART:   ABDOMEN:   EXTREMITIES:

## 2023-10-30 NOTE — PROGRESS NOTE ADULT - SUBJECTIVE AND OBJECTIVE BOX
24H events:    Patient is a 58y old Female who presents with a chief complaint of Pre-syncope (30 Oct 2023 14:41)    Primary diagnosis of Pre-syncope  Today is hospital day 16d. This morning patient was seen and examined at bedside, resting comfortably in bed.    No acute or major events overnight.    Code Status:    Family communication:  Contact date:  Name of person contacted:  Relationship to patient:  Communication details:  What matters most:    PAST MEDICAL & SURGICAL HISTORY  Down syndrome    Osteoporosis    Mild anemia    Neuropathy    S/P debridement  of R hip on 3/2/21      SOCIAL HISTORY:  Social History:  Lives at nursing home (14 Oct 2023 20:33)      ALLERGIES:  No Known Allergies    MEDICATIONS:  STANDING MEDICATIONS  chlorhexidine 2% Cloths 1 Application(s) Topical <User Schedule>  enoxaparin Injectable 55 milliGRAM(s) SubCutaneous every 12 hours  gabapentin 600 milliGRAM(s) Oral daily  levETIRAcetam 500 milliGRAM(s) Oral two times a day  midodrine 10 milliGRAM(s) Oral every 8 hours  multivitamin 1 Tablet(s) Oral daily  pantoprazole   Suspension 40 milliGRAM(s) Oral two times a day  polyethylene glycol 3350 17 Gram(s) Oral daily  raloxifene 60 milliGRAM(s) Oral daily    PRN MEDICATIONS  acetaminophen     Tablet .. 650 milliGRAM(s) Oral every 6 hours PRN  melatonin 5 milliGRAM(s) Oral at bedtime PRN    VITALS:   T(F): 99.4  HR: 65  BP: 91/50  RR: 20  SpO2: 95%    PHYSICAL EXAM:      GENERAL: NAD, lying in bed comfortably. on HFNC. nor verbal   HEAD:  Atraumatic, normocephalic  EYES: EOMI, PERRLA   ENT: Moist mucous membranes  NECK: Supple  HEART: Regular rate and rhythm, no murmurs  LUNGS: Unlabored respirations.  Clear to auscultation bilaterally  ABDOMEN: Soft, nontender, nondistended  EXTREMITIES: 2+ peripheral pulses bilaterally  NERVOUS SYSTEM:  awake, alert, does not follow commands              (  ) Central Line:   Date inserted:  Location: (  ) Right IJ     (  ) Left IJ     (  ) Right Fem     (  ) Left Fem    (  ) SPC        (  ) pigtail       (  ) PEG tube       (  ) colostomy       (  ) jejunostomy  (  ) U-Dall    LABS:                        11.2   7.60  )-----------( 433      ( 30 Oct 2023 05:57 )             36.8     10-30    140  |  102  |  21<H>  ----------------------------<  145<H>  4.1   |  28  |  0.8    Ca    9.1      30 Oct 2023 05:57  Mg     2.2     10-30    TPro  6.8  /  Alb  3.3<L>  /  TBili  0.3  /  DBili  x   /  AST  29  /  ALT  43<H>  /  AlkPhos  94  10-30      Urinalysis Basic - ( 30 Oct 2023 05:57 )    Color: x / Appearance: x / SG: x / pH: x  Gluc: 145 mg/dL / Ketone: x  / Bili: x / Urobili: x   Blood: x / Protein: x / Nitrite: x   Leuk Esterase: x / RBC: x / WBC x   Sq Epi: x / Non Sq Epi: x / Bacteria: x                RADIOLOGY:

## 2023-10-30 NOTE — PROGRESS NOTE ADULT - SUBJECTIVE AND OBJECTIVE BOX
JERICHO TEA  58y, Female  Allergy: No Known Allergies      LOS  16d    CHIEF COMPLAINT: Pre-syncope (30 Oct 2023 13:53)      INTERVAL EVENTS/HPI  - No acute events overnight  - T(F): , Max: 97.9 (10-29-23 @ 20:50)  - Denies any worsening symptoms  - Tolerating medication  - WBC Count: 7.60 (10-30-23 @ 05:57)  WBC Count: 7.02 (10-29-23 @ 06:55)     - Creatinine: 0.8 (10-30-23 @ 05:57)  Creatinine: 0.8 (10-29-23 @ 06:55)       ROS  General: Denies rigors, nightsweats  HEENT: Denies headache, rhinorrhea, sore throat, eye pain  CV: Denies CP, palpitations  PULM: Denies wheezing, hemoptysis  GI: Denies hematemesis, hematochezia, melena  : Denies discharge, hematuria  MSK: Denies arthralgias, myalgias  SKIN: Denies rash, lesions  NEURO: Denies paresthesias, weakness  PSYCH: Denies depression, anxiety    VITALS:  T(F): 97.2, Max: 97.9 (10-29-23 @ 20:50)  HR: 63  BP: 98/51  RR: 20Vital Signs Last 24 Hrs  T(C): 36.2 (30 Oct 2023 11:15), Max: 36.6 (29 Oct 2023 20:50)  T(F): 97.2 (30 Oct 2023 11:15), Max: 97.9 (29 Oct 2023 20:50)  HR: 63 (30 Oct 2023 11:15) (62 - 85)  BP: 98/51 (30 Oct 2023 11:15) (91/52 - 106/58)  BP(mean): 70 (30 Oct 2023 11:15) (66 - 78)  RR: 20 (30 Oct 2023 11:15) (16 - 20)  SpO2: 91% (30 Oct 2023 11:15) (91% - 98%)    Parameters below as of 30 Oct 2023 11:15  Patient On (Oxygen Delivery Method): nasal cannula, high flow        PHYSICAL EXAM:  Gen: NAD, resting in bed  HEENT: Normocephalic, atraumatic  Neck: supple, no lymphadenopathy  CV: Regular rate & regular rhythm  Lungs: decreased BS at bases, no fremitus  Abdomen: Soft, BS present  Ext: Warm, well perfused  Neuro: non focal, awake  Skin: right plantar ulcer with scran drainage - minimal erythema   Lines: no phlebitis    FH: Non-contributory  Social Hx: Non-contributory    TESTS & MEASUREMENTS:                        11.2   7.60  )-----------( 433      ( 30 Oct 2023 05:57 )             36.8     10-30    140  |  102  |  21<H>  ----------------------------<  145<H>  4.1   |  28  |  0.8    Ca    9.1      30 Oct 2023 05:57  Mg     2.2     10-30    TPro  6.8  /  Alb  3.3<L>  /  TBili  0.3  /  DBili  x   /  AST  29  /  ALT  43<H>  /  AlkPhos  94  10-30      LIVER FUNCTIONS - ( 30 Oct 2023 05:57 )  Alb: 3.3 g/dL / Pro: 6.8 g/dL / ALK PHOS: 94 U/L / ALT: 43 U/L / AST: 29 U/L / GGT: x           Urinalysis Basic - ( 30 Oct 2023 05:57 )    Color: x / Appearance: x / SG: x / pH: x  Gluc: 145 mg/dL / Ketone: x  / Bili: x / Urobili: x   Blood: x / Protein: x / Nitrite: x   Leuk Esterase: x / RBC: x / WBC x   Sq Epi: x / Non Sq Epi: x / Bacteria: x        Culture - Sputum (collected 10-17-23 @ 19:37)  Source: Trach Asp Tracheal Aspirate  Gram Stain (10-18-23 @ 02:39):    Numerous polymorphonuclear leukocytes per low power field    No Squamous epithelial cells per low power field    Moderate Yeast like cells seen per oil power field  Final Report (10-19-23 @ 21:17):    Normal Respiratory Mili present    Culture - Urine (collected 10-17-23 @ 19:37)  Source: Clean Catch Clean Catch (Midstream)  Final Report (10-19-23 @ 00:23):    No growth    Culture - Blood (collected 10-17-23 @ 12:35)  Source: .Blood Blood  Final Report (10-22-23 @ 22:00):    No growth at 5 days    Culture - Sputum (collected 10-04-23 @ 12:00)  Source: Trach Asp Tracheal Aspirate  Gram Stain (10-04-23 @ 22:58):    Few polymorphonuclear leukocytes per low power field    Rare Squamous epithelial cells per low power field    No organisms seen per oil power field  Final Report (10-06-23 @ 10:31):    No growth    Culture - Sputum (collected 10-02-23 @ 11:10)  Source: Trach Asp Tracheal Aspirate  Gram Stain (10-03-23 @ 07:33):    Moderate polymorphonuclear leukocytes per low power field    Rare Squamous epithelial cells per low power field    Few Gram positive cocci in pairs seen per oil power field    Rare Gram Positive Rods seen per oil power field    Rare Yeast like cells seen per oil power field  Final Report (10-04-23 @ 17:12):    No growth at 48 hours            INFECTIOUS DISEASES TESTING  Procalcitonin, Serum: 0.26 (10-24-23 @ 11:00)  MRSA PCR Result.: Negative (10-17-23 @ 17:20)  Fungitell: >500 (10-17-23 @ 17:20)  Procalcitonin, Serum: 4.36 (10-17-23 @ 17:20)  Procalcitonin, Serum: 4.18 (10-17-23 @ 12:35)  Procalcitonin, Serum: 0.07 (10-15-23 @ 07:28)  COVID-19 PCR: NotDetec (10-12-23 @ 09:14)  Procalcitonin, Serum: 0.40 (10-07-23 @ 10:54)  Legionella Antigen, Urine: Negative (09-30-23 @ 14:36)  MRSA PCR Result.: Negative (09-29-23 @ 16:50)  Procalcitonin, Serum: 9.78 (08-12-23 @ 11:25)  MRSA PCR Result.: Negative (08-12-23 @ 06:10)  Rapid RVP Result: NotDetec (08-12-23 @ 01:33)  Procalcitonin, Serum: 0.63 (08-10-23 @ 08:46)  Procalcitonin, Serum: 7.82 (08-04-23 @ 16:13)  MRSA PCR Result.: Negative (08-04-23 @ 14:52)  Rapid RVP Result: NotDetec (08-04-23 @ 03:00)      INFLAMMATORY MARKERS  Sedimentation Rate, Erythrocyte: 67 mm/Hr (10-15-23 @ 07:28)  C-Reactive Protein, Serum: 16.1 mg/L (10-15-23 @ 07:28)      RADIOLOGY & ADDITIONAL TESTS:  I have personally reviewed the last available Chest xray  CXR  Xray Chest 1 View- PORTABLE-Urgent:   ACC: [id number] EXAM:  XR CHEST PORTABLE URGENT 1V   ORDERED BY: SNOW KIM     PROCEDURE DATE:  10/30/2023          INTERPRETATION:  Clinical History/Reason for Exam:  f/u    Technique:  Frontal view the chest      Comparison: Chest x-ray10/27/2023    Findings/  impression:        Support devices: None    Cardiac/ Mediastinum: unremarkable    Lungs/ Pleura: Stable opacities most pronounced left lower lobe.. No   pneumothorax    Skeletal/ soft tissues: Stable              --- End of Report ---            HAWA OCHOA MD; Attending Radiologist  This document has been electronically signed. Oct 30 2023 12:11PM (10-30-23 @ 11:06)      CT      CARDIOLOGY TESTING  12 Lead ECG:   Ventricular Rate 123 BPM    Atrial Rate 123 BPM    P-R Interval 124 ms    QRS Duration 74 ms    Q-T Interval 310 ms    QTC Calculation(Bazett) 443 ms    P Axis 33 degrees    R Axis 27 degrees    T Axis 15 degrees    Diagnosis Line Sinus tachycardia  Nonspecific ST abnormality  Abnormal ECG    Confirmed by caleb roberts ([id number]) on 10/25/2023 2:09:24 PM (10-22-23 @ 18:46)      MEDICATIONS  chlorhexidine 2% Cloths 1 Topical <User Schedule>  enoxaparin Injectable 50 SubCutaneous every 12 hours  gabapentin 600 Oral daily  levETIRAcetam 500 Oral two times a day  midodrine 10 Oral every 8 hours  multivitamin 1 Oral daily  pantoprazole   Suspension 40 Oral two times a day  polyethylene glycol 3350 17 Oral daily  raloxifene 60 Oral daily      WEIGHT  Weight (kg): 60.6 (10-17-23 @ 19:00)  Creatinine: 0.8 mg/dL (10-30-23 @ 05:57)      ANTIBIOTICS:      All available historical records have been reviewed

## 2023-10-30 NOTE — PROGRESS NOTE ADULT - SUBJECTIVE AND OBJECTIVE BOX
Patient is a 58y old  Female who presents with a chief complaint of Pre-syncope (29 Oct 2023 15:02)        Over Night Events:  remains on HFNCo2.  Off pressors.          ROS:     All ROS are negative except HPI         PHYSICAL EXAM    ICU Vital Signs Last 24 Hrs  T(C): 36.3 (30 Oct 2023 08:00), Max: 36.6 (29 Oct 2023 11:00)  T(F): 97.4 (30 Oct 2023 08:00), Max: 97.9 (29 Oct 2023 20:50)  HR: 85 (30 Oct 2023 08:00) (62 - 85)  BP: 98/56 (30 Oct 2023 08:00) (91/52 - 110/68)  BP(mean): 72 (30 Oct 2023 08:00) (66 - 84)  ABP: --  ABP(mean): --  RR: 18 (30 Oct 2023 08:00) (16 - 20)  SpO2: 96% (30 Oct 2023 08:00) (94% - 98%)    O2 Parameters below as of 30 Oct 2023 08:00  Patient On (Oxygen Delivery Method): nasal cannula, high flow            CONSTITUTIONAL:  IN  NAD    ENT:   Airway patent,   Mouth with normal mucosa.     EYES:   Pupils equal,   Round and reactive to light.    CARDIAC:   Normal rate,   Regular rhythm.      RESPIRATORY:   No wheezing  Bilateral BS  Normal chest expansion  Not tachypneic,  No use of accessory muscles    GASTROINTESTINAL:  Abdomen soft,   Non-tender,   No guarding,   + BS    MUSCULOSKELETAL:   No clubbing, cyanosis    NEUROLOGICAL:   Alert     SKIN:   Skin normal color for race,   No evidence of rash.      10-29-23 @ 07:01  -  10-30-23 @ 07:00  --------------------------------------------------------  IN:  Total IN: 0 mL    OUT:    Indwelling Catheter - Urethral (mL): 245 mL  Total OUT: 245 mL    Total NET: -245 mL          LABS:                            11.2   7.60  )-----------( 433      ( 30 Oct 2023 05:57 )             36.8                                               10-30    140  |  102  |  21<H>  ----------------------------<  145<H>  4.1   |  28  |  0.8    Ca    9.1      30 Oct 2023 05:57  Mg     2.2     10-30    TPro  6.8  /  Alb  3.3<L>  /  TBili  0.3  /  DBili  x   /  AST  29  /  ALT  43<H>  /  AlkPhos  94  10-30                                             Urinalysis Basic - ( 30 Oct 2023 05:57 )    Color: x / Appearance: x / SG: x / pH: x  Gluc: 145 mg/dL / Ketone: x  / Bili: x / Urobili: x   Blood: x / Protein: x / Nitrite: x   Leuk Esterase: x / RBC: x / WBC x   Sq Epi: x / Non Sq Epi: x / Bacteria: x                                                  LIVER FUNCTIONS - ( 30 Oct 2023 05:57 )  Alb: 3.3 g/dL / Pro: 6.8 g/dL / ALK PHOS: 94 U/L / ALT: 43 U/L / AST: 29 U/L / GGT: x                                                                                                                                       MEDICATIONS  (STANDING):  chlorhexidine 2% Cloths 1 Application(s) Topical <User Schedule>  enoxaparin Injectable 50 milliGRAM(s) SubCutaneous every 12 hours  gabapentin 600 milliGRAM(s) Oral daily  levETIRAcetam 500 milliGRAM(s) Oral two times a day  midodrine 10 milliGRAM(s) Oral every 8 hours  multivitamin 1 Tablet(s) Oral daily  pantoprazole   Suspension 40 milliGRAM(s) Oral two times a day  polyethylene glycol 3350 17 Gram(s) Oral daily  raloxifene 60 milliGRAM(s) Oral daily    MEDICATIONS  (PRN):  acetaminophen     Tablet .. 650 milliGRAM(s) Oral every 6 hours PRN Temp greater or equal to 38C (100.4F), Mild Pain (1 - 3)  melatonin 5 milliGRAM(s) Oral at bedtime PRN Insomnia      New X-rays reviewed:                                                                                  ECHO

## 2023-10-30 NOTE — SWALLOW BEDSIDE ASSESSMENT ADULT - SLP PERTINENT HISTORY OF CURRENT PROBLEM
Pt is a 57 y/o F w/ PMHx: Down syndrome, nonverbal at baseline, hypothyroidism, cerebral palsy, congenital pulmonary stenosis, seizures, presents to the ED from nursing facility for syncope a/w tonic-clonic movement witnessed by aide. Pt w/ recent admission to ICU for UGIB, found to have duodenal perforation, PNA. Pt currently being treated for AHRF, aspiration PNA. Pt intubated 10/17, s/p extubation 10/21. Pt well known to acute SLP service from current and previous admissions. Pt w/ hx of multiple instrumental swallow studies. Pt's baseline diet is puree, thin liquids.

## 2023-10-30 NOTE — PROGRESS NOTE ADULT - ASSESSMENT
Patient is a 58 year old female with a PMHx of Down syndrome, nonverbal at baseline, hypothyroidism, cerebral palsy, congenital pulmonary stenosis, Seizures, presents to the ED from nursing facility for syncope the patient was hospitalized from  9/29/2023 -> 10/13/2023 for hemoptysis and duodenal perforation requiring intubation and ICU admission for hypovolemic and septic shock requiring temporary pressor support and treated for multifocal Pna  Patient's hospital course ICU --> Stepdown --> General medical floor. Patient remains hemodynamically stable on general medical, tolerating pureed feeds, and has bowel movements. Patient was discharged on 10/13/2023 , then she came to the  ED 10/14/2023 from nursing facility for syncope associated with tonic-clonic movement witnessed by aide. Initial vitals at nursing home showed bradycardia and hypoxia for about 10-15 minutes. No fevers, chill, cough, vomiting, diarrhea, difficulty breathing.    Pt initialy was admitted to medical floor then on 10/17 was upgraded to ICU fit rest distress and hypotension    Pt downgraded to CEU ,    Acute hypoxemic respiratory failure -  extubated on 10/21 Now on HFNC   Aspiration pneumonia    DVT -Nonocclusive deep venous thrombosis of the left common femoral vein. 10/17 /23   Elevated Fungitell   HO recent duodenal perforation   foot OM R  HO polymicrobial bacteremia   H/O CP  H/o seizure  Skin wounds     MR right foot - 1.  Limited exam. 2.  Osteomyelitis of the first metatarsal stump. 3.  Osteomyelitis of the second toe distal phalanx. Podiatry is following, was initially scheduled for OR debridement on 10/20, which was cancelled d/t patient condition.   Podiatry will  reschedule surgery when more stable   ID on board completed course of meropenem and caspo finished   SLP input appreciated ,1:1 feeding   chronic barlow, barlow cath care   Bilateral buttocks, Friction and Moisture Associated Skin Damage  Multiple deep tissue injuries to left foot. Blood filled blisters.   wound care input appreciated   c/w wound care as per reccs frequent turning, off loading,and positioning and skin care as per protocol, Maintain pressure injury prevention, Keep skin clean, Offload heels, Monitor wound for changes and notify provider if any   Podiatry following for wound to right foot. -  eventually podiatry evaluation when respiratory status improved   Neurology input appreciated  Started on  Keppra 500 mg BID for abnormal VEEG , monitor level    Wean O2 as tolerated.  Keep SaO2 92 TO 96%.  NIV during sleep  today on HFNC 40/40   lasix 20 iv given 10/27   CXR 10/30 noted   c/w lovenox therapeutic  - Pt weight 56.6 K today at bedside so dose adjusted       overall guarded prognosis .   Staff from group home at bedside later

## 2023-10-30 NOTE — PROGRESS NOTE ADULT - ASSESSMENT
ASSESSMENT   58 year old female with a PMHx of Down syndrome, nonverbal at baseline, hypothyroidism, cerebral palsy, congenital pulmonary stenosis, Seizures, presents to the ED from nursing facility for syncope associated with tonic-clonic movement witnessed by aide. Initial vitals at nursing home showed bradycardia and hypoxia for about 10-15 minutes. N    IMPRESSION  #Seizure like activity   #Right planter foot ulcers - two full thickness ulcers - serous drainage with mild erythema with OM  - MR Foot No Cont, Right (10.16.23 @ 21:51): IMPRESSION: 1.  Limited exam. 2.  Osteomyelitis of the first metatarsal stump. 3.  Osteomyelitis of the second toe distal phalanx.    #Rapid Response 10/17   #HAP   - Xray Chest 1 View- PORTABLE-Urgent (Xray Chest 1 View- PORTABLE-Urgent .) (10.17.23 @ 06:10): Worsening pneumonia, left lower lung.  - trach Cx 10/17 NG   - Procalcitonin, Serum: 4.36(10.17.23 @ 17:20)      #Elevated Fungitell   Fungitell: >500 pg/mL (10.17.23 @ 17:20)    #Buttock decubitus ulcer     #Recently Treated Multifocal PNA    #Down syndrome/Crebral Palsy   #Obesity BMI (kg/m2): 23.7, 26.3  #Abx allergy: No Known Allergies    RECOMMENDATIONS  - monitor off antibiotics   - follow-up serial CXR   - on HFNC - wean as tolerated   - eventually podiatry evaluation when respiratory status improved   - trend WBC     Please call or message on Microsoft Teams if with any questions.  Spectra [phone]

## 2023-10-30 NOTE — PROGRESS NOTE ADULT - ASSESSMENT
IMPRESSION:    Acute hypoxemic respiratory failure sp extubation on HHFNC  Aspiration pneumonia  DVT   Elevated Fungitell   HO recent duodenal perforation   foot OM  HO polymicrobial bacteremia   H/O CP  H/o seizures    PLAN:    CNS: Avoid CNS depressants     HEENT: Oral care.      PULMONARY: HOB at 45 degrees.  Aspiration precaution.  Wean O2 as tolerated.  Keep SaO2 92 TO 96%.  NIV during sleep.  Pulmonary toilet.  Repeat CXR.      CARDIOVASCULAR: Goal directed fluid resuscitation.      GI: Protonix BID.  Feeding per speech 1:1.  Bowel regimen.       RENAL:  FU lytes.  Correct as needed.      INFECTIOUS DISEASE:  Monitor VS>      HEMATOLOGICAL: DVT therapy.  Monitor CBC. Lovenox therapeutic    ENDOCRINE:  Follow up FS.  Insulin protocol if needed.    MUSCULOSKELETAL: Bedrest.  Off loading.  Wound care.      Prognosis guarded.      floor

## 2023-10-31 LAB
ALBUMIN SERPL ELPH-MCNC: 3.5 G/DL — SIGNIFICANT CHANGE UP (ref 3.5–5.2)
ALBUMIN SERPL ELPH-MCNC: 3.5 G/DL — SIGNIFICANT CHANGE UP (ref 3.5–5.2)
ALP SERPL-CCNC: 99 U/L — SIGNIFICANT CHANGE UP (ref 30–115)
ALP SERPL-CCNC: 99 U/L — SIGNIFICANT CHANGE UP (ref 30–115)
ALT FLD-CCNC: 40 U/L — SIGNIFICANT CHANGE UP (ref 0–41)
ALT FLD-CCNC: 40 U/L — SIGNIFICANT CHANGE UP (ref 0–41)
ANION GAP SERPL CALC-SCNC: 13 MMOL/L — SIGNIFICANT CHANGE UP (ref 7–14)
ANION GAP SERPL CALC-SCNC: 13 MMOL/L — SIGNIFICANT CHANGE UP (ref 7–14)
AST SERPL-CCNC: 25 U/L — SIGNIFICANT CHANGE UP (ref 0–41)
AST SERPL-CCNC: 25 U/L — SIGNIFICANT CHANGE UP (ref 0–41)
BASOPHILS # BLD AUTO: 0.11 K/UL — SIGNIFICANT CHANGE UP (ref 0–0.2)
BASOPHILS # BLD AUTO: 0.11 K/UL — SIGNIFICANT CHANGE UP (ref 0–0.2)
BASOPHILS NFR BLD AUTO: 1.3 % — HIGH (ref 0–1)
BASOPHILS NFR BLD AUTO: 1.3 % — HIGH (ref 0–1)
BILIRUB SERPL-MCNC: 0.3 MG/DL — SIGNIFICANT CHANGE UP (ref 0.2–1.2)
BILIRUB SERPL-MCNC: 0.3 MG/DL — SIGNIFICANT CHANGE UP (ref 0.2–1.2)
BUN SERPL-MCNC: 19 MG/DL — SIGNIFICANT CHANGE UP (ref 10–20)
BUN SERPL-MCNC: 19 MG/DL — SIGNIFICANT CHANGE UP (ref 10–20)
CALCIUM SERPL-MCNC: 8.8 MG/DL — SIGNIFICANT CHANGE UP (ref 8.4–10.5)
CALCIUM SERPL-MCNC: 8.8 MG/DL — SIGNIFICANT CHANGE UP (ref 8.4–10.5)
CHLORIDE SERPL-SCNC: 104 MMOL/L — SIGNIFICANT CHANGE UP (ref 98–110)
CHLORIDE SERPL-SCNC: 104 MMOL/L — SIGNIFICANT CHANGE UP (ref 98–110)
CO2 SERPL-SCNC: 27 MMOL/L — SIGNIFICANT CHANGE UP (ref 17–32)
CO2 SERPL-SCNC: 27 MMOL/L — SIGNIFICANT CHANGE UP (ref 17–32)
CREAT SERPL-MCNC: 0.8 MG/DL — SIGNIFICANT CHANGE UP (ref 0.7–1.5)
CREAT SERPL-MCNC: 0.8 MG/DL — SIGNIFICANT CHANGE UP (ref 0.7–1.5)
EGFR: 86 ML/MIN/1.73M2 — SIGNIFICANT CHANGE UP
EGFR: 86 ML/MIN/1.73M2 — SIGNIFICANT CHANGE UP
EOSINOPHIL # BLD AUTO: 0.23 K/UL — SIGNIFICANT CHANGE UP (ref 0–0.7)
EOSINOPHIL # BLD AUTO: 0.23 K/UL — SIGNIFICANT CHANGE UP (ref 0–0.7)
EOSINOPHIL NFR BLD AUTO: 2.8 % — SIGNIFICANT CHANGE UP (ref 0–8)
EOSINOPHIL NFR BLD AUTO: 2.8 % — SIGNIFICANT CHANGE UP (ref 0–8)
GLUCOSE SERPL-MCNC: 135 MG/DL — HIGH (ref 70–99)
GLUCOSE SERPL-MCNC: 135 MG/DL — HIGH (ref 70–99)
HCT VFR BLD CALC: 36.8 % — LOW (ref 37–47)
HCT VFR BLD CALC: 36.8 % — LOW (ref 37–47)
HGB BLD-MCNC: 11.3 G/DL — LOW (ref 12–16)
HGB BLD-MCNC: 11.3 G/DL — LOW (ref 12–16)
IMM GRANULOCYTES NFR BLD AUTO: 0.4 % — HIGH (ref 0.1–0.3)
IMM GRANULOCYTES NFR BLD AUTO: 0.4 % — HIGH (ref 0.1–0.3)
LYMPHOCYTES # BLD AUTO: 1.44 K/UL — SIGNIFICANT CHANGE UP (ref 1.2–3.4)
LYMPHOCYTES # BLD AUTO: 1.44 K/UL — SIGNIFICANT CHANGE UP (ref 1.2–3.4)
LYMPHOCYTES # BLD AUTO: 17.3 % — LOW (ref 20.5–51.1)
LYMPHOCYTES # BLD AUTO: 17.3 % — LOW (ref 20.5–51.1)
MAGNESIUM SERPL-MCNC: 2.4 MG/DL — SIGNIFICANT CHANGE UP (ref 1.8–2.4)
MAGNESIUM SERPL-MCNC: 2.4 MG/DL — SIGNIFICANT CHANGE UP (ref 1.8–2.4)
MCHC RBC-ENTMCNC: 24.1 PG — LOW (ref 27–31)
MCHC RBC-ENTMCNC: 24.1 PG — LOW (ref 27–31)
MCHC RBC-ENTMCNC: 30.7 G/DL — LOW (ref 32–37)
MCHC RBC-ENTMCNC: 30.7 G/DL — LOW (ref 32–37)
MCV RBC AUTO: 78.6 FL — LOW (ref 81–99)
MCV RBC AUTO: 78.6 FL — LOW (ref 81–99)
MONOCYTES # BLD AUTO: 0.69 K/UL — HIGH (ref 0.1–0.6)
MONOCYTES # BLD AUTO: 0.69 K/UL — HIGH (ref 0.1–0.6)
MONOCYTES NFR BLD AUTO: 8.3 % — SIGNIFICANT CHANGE UP (ref 1.7–9.3)
MONOCYTES NFR BLD AUTO: 8.3 % — SIGNIFICANT CHANGE UP (ref 1.7–9.3)
NEUTROPHILS # BLD AUTO: 5.8 K/UL — SIGNIFICANT CHANGE UP (ref 1.4–6.5)
NEUTROPHILS # BLD AUTO: 5.8 K/UL — SIGNIFICANT CHANGE UP (ref 1.4–6.5)
NEUTROPHILS NFR BLD AUTO: 69.9 % — SIGNIFICANT CHANGE UP (ref 42.2–75.2)
NEUTROPHILS NFR BLD AUTO: 69.9 % — SIGNIFICANT CHANGE UP (ref 42.2–75.2)
NRBC # BLD: 0 /100 WBCS — SIGNIFICANT CHANGE UP (ref 0–0)
NRBC # BLD: 0 /100 WBCS — SIGNIFICANT CHANGE UP (ref 0–0)
PLATELET # BLD AUTO: 486 K/UL — HIGH (ref 130–400)
PLATELET # BLD AUTO: 486 K/UL — HIGH (ref 130–400)
PMV BLD: 9.9 FL — SIGNIFICANT CHANGE UP (ref 7.4–10.4)
PMV BLD: 9.9 FL — SIGNIFICANT CHANGE UP (ref 7.4–10.4)
POTASSIUM SERPL-MCNC: 4 MMOL/L — SIGNIFICANT CHANGE UP (ref 3.5–5)
POTASSIUM SERPL-MCNC: 4 MMOL/L — SIGNIFICANT CHANGE UP (ref 3.5–5)
POTASSIUM SERPL-SCNC: 4 MMOL/L — SIGNIFICANT CHANGE UP (ref 3.5–5)
POTASSIUM SERPL-SCNC: 4 MMOL/L — SIGNIFICANT CHANGE UP (ref 3.5–5)
PROT SERPL-MCNC: 6.8 G/DL — SIGNIFICANT CHANGE UP (ref 6–8)
PROT SERPL-MCNC: 6.8 G/DL — SIGNIFICANT CHANGE UP (ref 6–8)
RBC # BLD: 4.68 M/UL — SIGNIFICANT CHANGE UP (ref 4.2–5.4)
RBC # BLD: 4.68 M/UL — SIGNIFICANT CHANGE UP (ref 4.2–5.4)
RBC # FLD: SIGNIFICANT CHANGE UP % (ref 11.5–14.5)
RBC # FLD: SIGNIFICANT CHANGE UP % (ref 11.5–14.5)
SODIUM SERPL-SCNC: 144 MMOL/L — SIGNIFICANT CHANGE UP (ref 135–146)
SODIUM SERPL-SCNC: 144 MMOL/L — SIGNIFICANT CHANGE UP (ref 135–146)
WBC # BLD: 8.3 K/UL — SIGNIFICANT CHANGE UP (ref 4.8–10.8)
WBC # BLD: 8.3 K/UL — SIGNIFICANT CHANGE UP (ref 4.8–10.8)
WBC # FLD AUTO: 8.3 K/UL — SIGNIFICANT CHANGE UP (ref 4.8–10.8)
WBC # FLD AUTO: 8.3 K/UL — SIGNIFICANT CHANGE UP (ref 4.8–10.8)

## 2023-10-31 PROCEDURE — 99232 SBSQ HOSP IP/OBS MODERATE 35: CPT

## 2023-10-31 PROCEDURE — 71045 X-RAY EXAM CHEST 1 VIEW: CPT | Mod: 26

## 2023-10-31 PROCEDURE — 99233 SBSQ HOSP IP/OBS HIGH 50: CPT

## 2023-10-31 RX ORDER — MIDODRINE HYDROCHLORIDE 2.5 MG/1
10 TABLET ORAL EVERY 8 HOURS
Refills: 0 | Status: DISCONTINUED | OUTPATIENT
Start: 2023-10-31 | End: 2023-11-10

## 2023-10-31 RX ORDER — PANTOPRAZOLE SODIUM 20 MG/1
40 TABLET, DELAYED RELEASE ORAL
Refills: 0 | Status: DISCONTINUED | OUTPATIENT
Start: 2023-10-31 | End: 2023-11-07

## 2023-10-31 RX ADMIN — ENOXAPARIN SODIUM 55 MILLIGRAM(S): 100 INJECTION SUBCUTANEOUS at 18:15

## 2023-10-31 RX ADMIN — MIDODRINE HYDROCHLORIDE 10 MILLIGRAM(S): 2.5 TABLET ORAL at 21:25

## 2023-10-31 RX ADMIN — Medication 1 TABLET(S): at 13:37

## 2023-10-31 RX ADMIN — PANTOPRAZOLE SODIUM 40 MILLIGRAM(S): 20 TABLET, DELAYED RELEASE ORAL at 08:25

## 2023-10-31 RX ADMIN — LEVETIRACETAM 500 MILLIGRAM(S): 250 TABLET, FILM COATED ORAL at 06:16

## 2023-10-31 RX ADMIN — MIDODRINE HYDROCHLORIDE 10 MILLIGRAM(S): 2.5 TABLET ORAL at 14:34

## 2023-10-31 RX ADMIN — ENOXAPARIN SODIUM 55 MILLIGRAM(S): 100 INJECTION SUBCUTANEOUS at 06:16

## 2023-10-31 RX ADMIN — GABAPENTIN 600 MILLIGRAM(S): 400 CAPSULE ORAL at 13:36

## 2023-10-31 RX ADMIN — LEVETIRACETAM 500 MILLIGRAM(S): 250 TABLET, FILM COATED ORAL at 18:16

## 2023-10-31 RX ADMIN — CHLORHEXIDINE GLUCONATE 1 APPLICATION(S): 213 SOLUTION TOPICAL at 06:15

## 2023-10-31 RX ADMIN — RALOXIFENE HYDROCHLORIDE 60 MILLIGRAM(S): 60 TABLET, COATED ORAL at 13:37

## 2023-10-31 NOTE — PROGRESS NOTE ADULT - ASSESSMENT
IMPRESSION:    Acute hypoxemic respiratory failure sp extubation on HHFNC  Aspiration pneumonia  DVT   Elevated Fungitell   HO recent duodenal perforation   foot OM  HO polymicrobial bacteremia   H/O CP  H/o seizures    PLAN:    CNS: Avoid CNS depressants     HEENT: Oral care.      PULMONARY: HOB at 45 degrees.  Aspiration precaution.  Wean O2 as tolerated.  Keep SaO2 92 TO 96%.  NIV during sleep.  Pulmonary toilet.  Repeat CXR noted      CARDIOVASCULAR: Goal directed fluid resuscitation.      GI: Protonix BID.  Feeding per speech 1:1.  Bowel regimen.       RENAL:  FU lytes.  Correct as needed.      INFECTIOUS DISEASE:  Monitor VS>      HEMATOLOGICAL: DVT therapy.  Monitor CBC. Lovenox therapeutic    ENDOCRINE:  Follow up FS.  Insulin protocol if needed.    MUSCULOSKELETAL: Bedrest.  Off loading.  Wound care.      Prognosis guarded.      SDU

## 2023-10-31 NOTE — PROGRESS NOTE ADULT - SUBJECTIVE AND OBJECTIVE BOX
24H events:    Patient is a 58y old Female who presents with a chief complaint of Pre-syncope (31 Oct 2023 09:18)  Primary diagnosis of Pre-syncope  Today is hospital day 17d. This morning patient was seen and examined at bedside, resting comfortably in bed.    No acute or major events overnight.    Code Status:    Family communication:  Contact date:  Name of person contacted:  Relationship to patient:  Communication details:  What matters most:    PAST MEDICAL & SURGICAL HISTORY  Down syndrome    Osteoporosis    Mild anemia    Neuropathy    S/P debridement  of R hip on 3/2/21      SOCIAL HISTORY:  Social History:  Lives at nursing home (14 Oct 2023 20:33)      ALLERGIES:  No Known Allergies    MEDICATIONS:  STANDING MEDICATIONS  chlorhexidine 2% Cloths 1 Application(s) Topical <User Schedule>  enoxaparin Injectable 55 milliGRAM(s) SubCutaneous every 12 hours  gabapentin 600 milliGRAM(s) Oral daily  levETIRAcetam 500 milliGRAM(s) Oral two times a day  midodrine 10 milliGRAM(s) Oral every 8 hours  multivitamin 1 Tablet(s) Oral daily  pantoprazole   Suspension 40 milliGRAM(s) Oral two times a day  polyethylene glycol 3350 17 Gram(s) Oral daily  raloxifene 60 milliGRAM(s) Oral daily    PRN MEDICATIONS  acetaminophen     Tablet .. 650 milliGRAM(s) Oral every 6 hours PRN  melatonin 5 milliGRAM(s) Oral at bedtime PRN    VITALS:   T(F): 97.8  HR: 101  BP: 108/67  RR: 20  SpO2: 92%    PHYSICAL EXAM:          GENERAL: NAD, lying in bed comfortably. on HFNC. nor verbal   HEAD:  Atraumatic, normocephalic  EYES: EOMI, PERRLA   ENT: Moist mucous membranes  NECK: Supple  HEART: Regular rate and rhythm, no murmurs  LUNGS: Unlabored respirations.  Clear to auscultation bilaterally  ABDOMEN: Soft, nontender, nondistended  EXTREMITIES: 2+ peripheral pulses bilaterally  NERVOUS SYSTEM:  awake, alert, does not follow commands            AMPAC score:    (  ) Indwelling Madsen Catheter:   Date insterted:    Reason (  ) Critical illness     (  ) urinary retention    (  ) Accurate Ins/Outs Monitoring     (  ) CMO patient    (  ) Central Line:   Date inserted:  Location: (  ) Right IJ     (  ) Left IJ     (  ) Right Fem     (  ) Left Fem    (  ) SPC        (  ) pigtail       (  ) PEG tube       (  ) colostomy       (  ) jejunostomy  (  ) U-Dall    LABS:                        11.3   8.30  )-----------( 486      ( 31 Oct 2023 04:49 )             36.8     10-31    144  |  104  |  19  ----------------------------<  135<H>  4.0   |  27  |  0.8    Ca    8.8      31 Oct 2023 04:49  Mg     2.4     10-31    TPro  6.8  /  Alb  3.5  /  TBili  0.3  /  DBili  x   /  AST  25  /  ALT  40  /  AlkPhos  99  10-31      Urinalysis Basic - ( 31 Oct 2023 04:49 )    Color: x / Appearance: x / SG: x / pH: x  Gluc: 135 mg/dL / Ketone: x  / Bili: x / Urobili: x   Blood: x / Protein: x / Nitrite: x   Leuk Esterase: x / RBC: x / WBC x   Sq Epi: x / Non Sq Epi: x / Bacteria: x                RADIOLOGY:

## 2023-10-31 NOTE — PROGRESS NOTE ADULT - ASSESSMENT
Patient is a 58 year old female with a PMHx of Down syndrome, nonverbal at baseline, hypothyroidism, cerebral palsy, congenital pulmonary stenosis, Seizures, presents to the ED from nursing facility for syncope the patient was hospitalized from  9/29/2023 -> 10/13/2023 for hemoptysis and duodenal perforation requiring intubation and ICU admission for hypovolemic and septic shock requiring temporary pressor support and treated for multifocal Pna  Patient's hospital course ICU --> Stepdown --> General medical floor. Patient remains hemodynamically stable on general medical, tolerating pureed feeds, and has bowel movements. Patient was discharged on 10/13/2023 , then she came to the  ED 10/14/2023 from nursing facility for syncope associated with tonic-clonic movement witnessed by aide. Initial vitals at nursing home showed bradycardia and hypoxia for about 10-15 minutes. No fevers, chill, cough, vomiting, diarrhea, difficulty breathing.    Pt initialy was admitted to medical floor then on 10/17 was upgraded to ICU fit rest distress and hypotension    Pt downgraded to CEU ,    Acute hypoxemic respiratory failure -  extubated on 10/21 Now on HFNC   Aspiration pneumonia    DVT -Nonocclusive deep venous thrombosis of the left common femoral vein. 10/17 /23   Elevated Fungitell   HO recent duodenal perforation   foot OM R  HO polymicrobial bacteremia   H/O CP  H/o seizure  Skin wounds     MR right foot - 1.  Limited exam. 2.  Osteomyelitis of the first metatarsal stump. 3.  Osteomyelitis of the second toe distal phalanx. Podiatry is following, was initially scheduled for OR debridement on 10/20, which was cancelled d/t patient condition.   Podiatry will  reschedule surgery when more stable   ID on board completed course of meropenem and caspo finished   SLP input appreciated ,1:1 feeding   chronic barlow, barlow cath care   Bilateral buttocks, Friction and Moisture Associated Skin Damage  Multiple deep tissue injuries to left foot. Blood filled blisters.   wound care input appreciated   c/w wound care as per reccs frequent turning, off loading,and positioning and skin care as per protocol, Maintain pressure injury prevention, Keep skin clean, Offload heels, Monitor wound for changes and notify provider if any   Podiatry following for wound to right foot. -  eventually podiatry evaluation when respiratory status improved   Neurology input appreciated  Started on  Keppra 500 mg BID for abnormal VEEG , monitor level    Wean O2 as tolerated.  Keep SaO2 92 TO 96%.  NIV during sleep  today on HFNC 40/40   lasix 20 iv given 10/27   CXR 10/30 noted   c/w Lovenox therapeutic , repeat LE duplex        overall guarded prognosis .   Staff from group home at bedside     pending: on HFNC 40/40 , LE duplex , Eventually podiatry evaluation when respiratory status improved

## 2023-10-31 NOTE — PROGRESS NOTE ADULT - SUBJECTIVE AND OBJECTIVE BOX
Patient is a 58y old  Female who presents with a chief complaint of Pre-syncope (30 Oct 2023 16:37)        Over Night Events:  on HFNCO2.  Off pressors.          ROS:     All ROS are negative except HPI         PHYSICAL EXAM    ICU Vital Signs Last 24 Hrs  T(C): 36.6 (31 Oct 2023 08:00), Max: 37.4 (30 Oct 2023 15:55)  T(F): 97.9 (31 Oct 2023 08:00), Max: 99.4 (30 Oct 2023 15:55)  HR: 74 (31 Oct 2023 08:00) (63 - 105)  BP: 96/51 (31 Oct 2023 08:00) (91/50 - 144/80)  BP(mean): 70 (31 Oct 2023 08:00) (70 - 70)  ABP: --  ABP(mean): --  RR: 20 (31 Oct 2023 08:00) (20 - 20)  SpO2: 92% (31 Oct 2023 08:00) (91% - 96%)    O2 Parameters below as of 31 Oct 2023 08:00  Patient On (Oxygen Delivery Method): nasal cannula, high flow            CONSTITUTIONAL:  In  NAD    ENT:   Airway patent,   Mouth with normal mucosa.       EYES:   Pupils equal,   Round and reactive to light.    CARDIAC:   Tachycardic   Regular rhythm.        RESPIRATORY:   No wheezing  Bilateral BS  Normal chest expansion  Not tachypneic,  No use of accessory muscles    GASTROINTESTINAL:  Abdomen soft,   Non-tender,   No guarding,   + BS    MUSCULOSKELETAL:   Contracted  No clubbing, cyanosis    NEUROLOGICAL:   Alert       SKIN:   Skin normal color for race,   Warm and dry  No evidence of rash.        10-30-23 @ 07:01  -  10-31-23 @ 07:00  --------------------------------------------------------  IN:    Oral Fluid: 330 mL  Total IN: 330 mL    OUT:    Indwelling Catheter - Urethral (mL): 300 mL  Total OUT: 300 mL    Total NET: 30 mL          LABS:                            11.3   8.30  )-----------( 486      ( 31 Oct 2023 04:49 )             36.8                                               10-31    144  |  104  |  19  ----------------------------<  135<H>  4.0   |  27  |  0.8    Ca    8.8      31 Oct 2023 04:49  Mg     2.4     10-31    TPro  6.8  /  Alb  3.5  /  TBili  0.3  /  DBili  x   /  AST  25  /  ALT  40  /  AlkPhos  99  10-31                                             Urinalysis Basic - ( 31 Oct 2023 04:49 )    Color: x / Appearance: x / SG: x / pH: x  Gluc: 135 mg/dL / Ketone: x  / Bili: x / Urobili: x   Blood: x / Protein: x / Nitrite: x   Leuk Esterase: x / RBC: x / WBC x   Sq Epi: x / Non Sq Epi: x / Bacteria: x                                                  LIVER FUNCTIONS - ( 31 Oct 2023 04:49 )  Alb: 3.5 g/dL / Pro: 6.8 g/dL / ALK PHOS: 99 U/L / ALT: 40 U/L / AST: 25 U/L / GGT: x                                                                                                                                       MEDICATIONS  (STANDING):  chlorhexidine 2% Cloths 1 Application(s) Topical <User Schedule>  enoxaparin Injectable 55 milliGRAM(s) SubCutaneous every 12 hours  gabapentin 600 milliGRAM(s) Oral daily  levETIRAcetam 500 milliGRAM(s) Oral two times a day  midodrine 10 milliGRAM(s) Oral every 8 hours  multivitamin 1 Tablet(s) Oral daily  pantoprazole   Suspension 40 milliGRAM(s) Oral two times a day  polyethylene glycol 3350 17 Gram(s) Oral daily  raloxifene 60 milliGRAM(s) Oral daily    MEDICATIONS  (PRN):  acetaminophen     Tablet .. 650 milliGRAM(s) Oral every 6 hours PRN Temp greater or equal to 38C (100.4F), Mild Pain (1 - 3)  melatonin 5 milliGRAM(s) Oral at bedtime PRN Insomnia      New X-rays reviewed:                                                                                  ECHO

## 2023-10-31 NOTE — PROGRESS NOTE ADULT - SUBJECTIVE AND OBJECTIVE BOX
JERICHOTEA  58y, Female  Allergy: No Known Allergies    Hospital Day: 17d    Patient seen and examined earlier today. she still o [facility],  Staff from  at bedside later     PMH/PSH:  PAST MEDICAL & SURGICAL HISTORY:  Down syndrome      Osteoporosis      Mild anemia      Neuropathy      S/P debridement  of R hip on 3/2/21          LAST 24-Hr EVENTS:    VITALS:  T(F): 97.8 (10-31-23 @ 16:00), Max: 99.1 (10-30-23 @ 20:35)  HR: 92 (10-31-23 @ 16:00)  BP: 104/57 (10-31-23 @ 16:00) (96/51 - 144/80)  RR: 18 (10-31-23 @ 16:00)  SpO2: 94% (10-31-23 @ 16:00)          TESTS & MEASUREMENTS:  Weight/BMI  56.1 (10-31-23 @ 08:30)    10-29-23 @ 07:01  -  10-30-23 @ 07:00  --------------------------------------------------------  IN: 0 mL / OUT: 245 mL / NET: -245 mL    10-30-23 @ 07:01  -  10-31-23 @ 07:00  --------------------------------------------------------  IN: 330 mL / OUT: 300 mL / NET: 30 mL                            11.3   8.30  )-----------( 486      ( 31 Oct 2023 04:49 )             36.8         10-31    144  |  104  |  19  ----------------------------<  135<H>  4.0   |  27  |  0.8    Ca    8.8      31 Oct 2023 04:49  Mg     2.4     10-31    TPro  6.8  /  Alb  3.5  /  TBili  0.3  /  DBili  x   /  AST  25  /  ALT  40  /  AlkPhos  99  10-31    LIVER FUNCTIONS - ( 31 Oct 2023 04:49 )  Alb: 3.5 g/dL / Pro: 6.8 g/dL / ALK PHOS: 99 U/L / ALT: 40 U/L / AST: 25 U/L / GGT: x                 Urinalysis Basic - ( 31 Oct 2023 04:49 )    Color: x / Appearance: x / SG: x / pH: x  Gluc: 135 mg/dL / Ketone: x  / Bili: x / Urobili: x   Blood: x / Protein: x / Nitrite: x   Leuk Esterase: x / RBC: x / WBC x   Sq Epi: x / Non Sq Epi: x / Bacteria: x      Procalcitonin, Serum: 0.26 ng/mL (10-24-23 @ 11:00)                    Indwelling Urethral Catheter:     Connect To:  Straight Drainage/[place]    Indication:  Urinary Retention / Obstruction (10-31-23 @ 09:19) (not performed)  Indwelling Urethral Catheter:     Connect To:  Straight Drainage/[place]    Indication:  Urinary Retention / Obstruction (10-30-23 @ 13:17) (not performed)      RADIOLOGY, ECG, & ADDITIONAL TESTS:  12 Lead ECG:   Ventricular Rate 123 BPM    Atrial Rate 123 BPM    P-R Interval 124 ms    QRS Duration 74 ms    Q-T Interval 310 ms    QTC Calculation(Bazett) 443 ms    P Axis 33 degrees    R Axis 27 degrees    T Axis 15 degrees    Diagnosis Line Sinus tachycardia  Nonspecific ST abnormality  Abnormal ECG    Confirmed by caleb roberts ([id number]) on 10/25/2023 2:09:24 PM (10-22-23 @ 18:46)      RECENT DIAGNOSTIC ORDERS:  VA Duplex Lower Ext Vein Scan, Bilat: Routine   Indication: r/o worsening dvt  Transport: Stretcher-Crib,  w/ Monitor,  w/ Ventilator,  w/ IV Pole (10-31-23 @ 15:36)  Xray Chest 1 View- PORTABLE-Routine: AM   Indication: FOLLOW UP .  Transport: Portable,  w/ Monitor,  w/ Ventilator,  w/ IV Pole (10-31-23 @ 11:55)  Magnesium: AM Sched. Collection: 01-Nov-2023 04:30 (10-31-23 @ 09:56)  Comprehensive Metabolic Panel: AM Sched. Collection: 01-Nov-2023 04:30 (10-31-23 @ 09:56)  Complete Blood Count + Automated Diff: AM Sched. Collection: 01-Nov-2023 04:30 (10-31-23 @ 09:56)      MEDICATIONS:  MEDICATIONS  (STANDING):  chlorhexidine 2% Cloths 1 Application(s) Topical <User Schedule>  enoxaparin Injectable 55 milliGRAM(s) SubCutaneous every 12 hours  gabapentin 600 milliGRAM(s) Oral daily  levETIRAcetam 500 milliGRAM(s) Oral two times a day  midodrine 10 milliGRAM(s) Oral every 8 hours  multivitamin 1 Tablet(s) Oral daily  pantoprazole   Suspension 40 milliGRAM(s) Oral two times a day  polyethylene glycol 3350 17 Gram(s) Oral daily  raloxifene 60 milliGRAM(s) Oral daily    MEDICATIONS  (PRN):  acetaminophen     Tablet .. 650 milliGRAM(s) Oral every 6 hours PRN Temp greater or equal to 38C (100.4F), Mild Pain (1 - 3)  melatonin 5 milliGRAM(s) Oral at bedtime PRN Insomnia      HOME MEDICATIONS:  gabapentin 600 mg oral tablet (10-14)  melatonin 5 mg oral tablet (10-14)  midodrine 5 mg oral tablet (10-14)  Multiple Vitamins oral tablet (10-14)  Protonix 40 mg oral delayed release tablet (10-14)  raloxifene 60 mg oral tablet (10-14)      PHYSICAL EXAM:  General: awake, non verbal, NAD, chronic ill appearance  Lungs:  crackles b/l, normal resp effort on NC   Heart: regular ryhthm   Abdomen: soft, non tender non distended  Ext: contracted LE , feet dressings

## 2023-11-01 LAB
ALBUMIN SERPL ELPH-MCNC: 3.2 G/DL — LOW (ref 3.5–5.2)
ALBUMIN SERPL ELPH-MCNC: 3.2 G/DL — LOW (ref 3.5–5.2)
ALP SERPL-CCNC: 99 U/L — SIGNIFICANT CHANGE UP (ref 30–115)
ALP SERPL-CCNC: 99 U/L — SIGNIFICANT CHANGE UP (ref 30–115)
ALT FLD-CCNC: 39 U/L — SIGNIFICANT CHANGE UP (ref 0–41)
ALT FLD-CCNC: 39 U/L — SIGNIFICANT CHANGE UP (ref 0–41)
ANION GAP SERPL CALC-SCNC: 11 MMOL/L — SIGNIFICANT CHANGE UP (ref 7–14)
ANION GAP SERPL CALC-SCNC: 11 MMOL/L — SIGNIFICANT CHANGE UP (ref 7–14)
AST SERPL-CCNC: 30 U/L — SIGNIFICANT CHANGE UP (ref 0–41)
AST SERPL-CCNC: 30 U/L — SIGNIFICANT CHANGE UP (ref 0–41)
BASOPHILS # BLD AUTO: 0.1 K/UL — SIGNIFICANT CHANGE UP (ref 0–0.2)
BASOPHILS # BLD AUTO: 0.1 K/UL — SIGNIFICANT CHANGE UP (ref 0–0.2)
BASOPHILS NFR BLD AUTO: 1.6 % — HIGH (ref 0–1)
BASOPHILS NFR BLD AUTO: 1.6 % — HIGH (ref 0–1)
BILIRUB SERPL-MCNC: 0.3 MG/DL — SIGNIFICANT CHANGE UP (ref 0.2–1.2)
BILIRUB SERPL-MCNC: 0.3 MG/DL — SIGNIFICANT CHANGE UP (ref 0.2–1.2)
BUN SERPL-MCNC: 20 MG/DL — SIGNIFICANT CHANGE UP (ref 10–20)
BUN SERPL-MCNC: 20 MG/DL — SIGNIFICANT CHANGE UP (ref 10–20)
CALCIUM SERPL-MCNC: 9 MG/DL — SIGNIFICANT CHANGE UP (ref 8.4–10.5)
CALCIUM SERPL-MCNC: 9 MG/DL — SIGNIFICANT CHANGE UP (ref 8.4–10.5)
CHLORIDE SERPL-SCNC: 105 MMOL/L — SIGNIFICANT CHANGE UP (ref 98–110)
CHLORIDE SERPL-SCNC: 105 MMOL/L — SIGNIFICANT CHANGE UP (ref 98–110)
CO2 SERPL-SCNC: 28 MMOL/L — SIGNIFICANT CHANGE UP (ref 17–32)
CO2 SERPL-SCNC: 28 MMOL/L — SIGNIFICANT CHANGE UP (ref 17–32)
CREAT SERPL-MCNC: 0.8 MG/DL — SIGNIFICANT CHANGE UP (ref 0.7–1.5)
CREAT SERPL-MCNC: 0.8 MG/DL — SIGNIFICANT CHANGE UP (ref 0.7–1.5)
EGFR: 86 ML/MIN/1.73M2 — SIGNIFICANT CHANGE UP
EGFR: 86 ML/MIN/1.73M2 — SIGNIFICANT CHANGE UP
EOSINOPHIL # BLD AUTO: 0.23 K/UL — SIGNIFICANT CHANGE UP (ref 0–0.7)
EOSINOPHIL # BLD AUTO: 0.23 K/UL — SIGNIFICANT CHANGE UP (ref 0–0.7)
EOSINOPHIL NFR BLD AUTO: 3.6 % — SIGNIFICANT CHANGE UP (ref 0–8)
EOSINOPHIL NFR BLD AUTO: 3.6 % — SIGNIFICANT CHANGE UP (ref 0–8)
GLUCOSE SERPL-MCNC: 129 MG/DL — HIGH (ref 70–99)
GLUCOSE SERPL-MCNC: 129 MG/DL — HIGH (ref 70–99)
HCT VFR BLD CALC: 35.9 % — LOW (ref 37–47)
HCT VFR BLD CALC: 35.9 % — LOW (ref 37–47)
HGB BLD-MCNC: 10.9 G/DL — LOW (ref 12–16)
HGB BLD-MCNC: 10.9 G/DL — LOW (ref 12–16)
IMM GRANULOCYTES NFR BLD AUTO: 0.3 % — SIGNIFICANT CHANGE UP (ref 0.1–0.3)
IMM GRANULOCYTES NFR BLD AUTO: 0.3 % — SIGNIFICANT CHANGE UP (ref 0.1–0.3)
LYMPHOCYTES # BLD AUTO: 1.53 K/UL — SIGNIFICANT CHANGE UP (ref 1.2–3.4)
LYMPHOCYTES # BLD AUTO: 1.53 K/UL — SIGNIFICANT CHANGE UP (ref 1.2–3.4)
LYMPHOCYTES # BLD AUTO: 23.8 % — SIGNIFICANT CHANGE UP (ref 20.5–51.1)
LYMPHOCYTES # BLD AUTO: 23.8 % — SIGNIFICANT CHANGE UP (ref 20.5–51.1)
MAGNESIUM SERPL-MCNC: 2.3 MG/DL — SIGNIFICANT CHANGE UP (ref 1.8–2.4)
MAGNESIUM SERPL-MCNC: 2.3 MG/DL — SIGNIFICANT CHANGE UP (ref 1.8–2.4)
MCHC RBC-ENTMCNC: 24.4 PG — LOW (ref 27–31)
MCHC RBC-ENTMCNC: 24.4 PG — LOW (ref 27–31)
MCHC RBC-ENTMCNC: 30.4 G/DL — LOW (ref 32–37)
MCHC RBC-ENTMCNC: 30.4 G/DL — LOW (ref 32–37)
MCV RBC AUTO: 80.5 FL — LOW (ref 81–99)
MCV RBC AUTO: 80.5 FL — LOW (ref 81–99)
MONOCYTES # BLD AUTO: 0.56 K/UL — SIGNIFICANT CHANGE UP (ref 0.1–0.6)
MONOCYTES # BLD AUTO: 0.56 K/UL — SIGNIFICANT CHANGE UP (ref 0.1–0.6)
MONOCYTES NFR BLD AUTO: 8.7 % — SIGNIFICANT CHANGE UP (ref 1.7–9.3)
MONOCYTES NFR BLD AUTO: 8.7 % — SIGNIFICANT CHANGE UP (ref 1.7–9.3)
NEUTROPHILS # BLD AUTO: 4 K/UL — SIGNIFICANT CHANGE UP (ref 1.4–6.5)
NEUTROPHILS # BLD AUTO: 4 K/UL — SIGNIFICANT CHANGE UP (ref 1.4–6.5)
NEUTROPHILS NFR BLD AUTO: 62 % — SIGNIFICANT CHANGE UP (ref 42.2–75.2)
NEUTROPHILS NFR BLD AUTO: 62 % — SIGNIFICANT CHANGE UP (ref 42.2–75.2)
NRBC # BLD: 0 /100 WBCS — SIGNIFICANT CHANGE UP (ref 0–0)
NRBC # BLD: 0 /100 WBCS — SIGNIFICANT CHANGE UP (ref 0–0)
PLATELET # BLD AUTO: 449 K/UL — HIGH (ref 130–400)
PLATELET # BLD AUTO: 449 K/UL — HIGH (ref 130–400)
PMV BLD: 10.2 FL — SIGNIFICANT CHANGE UP (ref 7.4–10.4)
PMV BLD: 10.2 FL — SIGNIFICANT CHANGE UP (ref 7.4–10.4)
POTASSIUM SERPL-MCNC: 3.9 MMOL/L — SIGNIFICANT CHANGE UP (ref 3.5–5)
POTASSIUM SERPL-MCNC: 3.9 MMOL/L — SIGNIFICANT CHANGE UP (ref 3.5–5)
POTASSIUM SERPL-SCNC: 3.9 MMOL/L — SIGNIFICANT CHANGE UP (ref 3.5–5)
POTASSIUM SERPL-SCNC: 3.9 MMOL/L — SIGNIFICANT CHANGE UP (ref 3.5–5)
PROT SERPL-MCNC: 6.5 G/DL — SIGNIFICANT CHANGE UP (ref 6–8)
PROT SERPL-MCNC: 6.5 G/DL — SIGNIFICANT CHANGE UP (ref 6–8)
RBC # BLD: 4.46 M/UL — SIGNIFICANT CHANGE UP (ref 4.2–5.4)
RBC # BLD: 4.46 M/UL — SIGNIFICANT CHANGE UP (ref 4.2–5.4)
RBC # FLD: SIGNIFICANT CHANGE UP % (ref 11.5–14.5)
RBC # FLD: SIGNIFICANT CHANGE UP % (ref 11.5–14.5)
SODIUM SERPL-SCNC: 144 MMOL/L — SIGNIFICANT CHANGE UP (ref 135–146)
SODIUM SERPL-SCNC: 144 MMOL/L — SIGNIFICANT CHANGE UP (ref 135–146)
WBC # BLD: 6.44 K/UL — SIGNIFICANT CHANGE UP (ref 4.8–10.8)
WBC # BLD: 6.44 K/UL — SIGNIFICANT CHANGE UP (ref 4.8–10.8)
WBC # FLD AUTO: 6.44 K/UL — SIGNIFICANT CHANGE UP (ref 4.8–10.8)
WBC # FLD AUTO: 6.44 K/UL — SIGNIFICANT CHANGE UP (ref 4.8–10.8)

## 2023-11-01 PROCEDURE — 93970 EXTREMITY STUDY: CPT | Mod: 26

## 2023-11-01 PROCEDURE — 99233 SBSQ HOSP IP/OBS HIGH 50: CPT

## 2023-11-01 PROCEDURE — 71045 X-RAY EXAM CHEST 1 VIEW: CPT | Mod: 26

## 2023-11-01 RX ADMIN — POLYETHYLENE GLYCOL 3350 17 GRAM(S): 17 POWDER, FOR SOLUTION ORAL at 12:33

## 2023-11-01 RX ADMIN — MIDODRINE HYDROCHLORIDE 10 MILLIGRAM(S): 2.5 TABLET ORAL at 05:20

## 2023-11-01 RX ADMIN — MIDODRINE HYDROCHLORIDE 10 MILLIGRAM(S): 2.5 TABLET ORAL at 21:34

## 2023-11-01 RX ADMIN — RALOXIFENE HYDROCHLORIDE 60 MILLIGRAM(S): 60 TABLET, COATED ORAL at 12:34

## 2023-11-01 RX ADMIN — Medication 1 TABLET(S): at 12:34

## 2023-11-01 RX ADMIN — PANTOPRAZOLE SODIUM 40 MILLIGRAM(S): 20 TABLET, DELAYED RELEASE ORAL at 05:22

## 2023-11-01 RX ADMIN — LEVETIRACETAM 500 MILLIGRAM(S): 250 TABLET, FILM COATED ORAL at 17:17

## 2023-11-01 RX ADMIN — CHLORHEXIDINE GLUCONATE 1 APPLICATION(S): 213 SOLUTION TOPICAL at 05:11

## 2023-11-01 RX ADMIN — GABAPENTIN 600 MILLIGRAM(S): 400 CAPSULE ORAL at 12:34

## 2023-11-01 RX ADMIN — ENOXAPARIN SODIUM 55 MILLIGRAM(S): 100 INJECTION SUBCUTANEOUS at 17:20

## 2023-11-01 RX ADMIN — MIDODRINE HYDROCHLORIDE 10 MILLIGRAM(S): 2.5 TABLET ORAL at 14:53

## 2023-11-01 RX ADMIN — ENOXAPARIN SODIUM 55 MILLIGRAM(S): 100 INJECTION SUBCUTANEOUS at 05:11

## 2023-11-01 RX ADMIN — PANTOPRAZOLE SODIUM 40 MILLIGRAM(S): 20 TABLET, DELAYED RELEASE ORAL at 17:17

## 2023-11-01 RX ADMIN — LEVETIRACETAM 500 MILLIGRAM(S): 250 TABLET, FILM COATED ORAL at 05:11

## 2023-11-01 NOTE — PROGRESS NOTE ADULT - SUBJECTIVE AND OBJECTIVE BOX
Podiatry Progress Note    Subjective:  TEA ECHAVARRIA is a  58y Female.   Seen bedside.   Patient is a 58y old  Female who presents with a chief complaint of Pre-syncope (01 Nov 2023 08:58)      Past Medical History and Surgical History  PAST MEDICAL & SURGICAL HISTORY:  Down syndrome      Osteoporosis      Mild anemia      Neuropathy      S/P debridement  of R hip on 3/2/21           Objective:  Vital Signs Last 24 Hrs  T(C): 37.3 (01 Nov 2023 04:00), Max: 37.3 (01 Nov 2023 04:00)  T(F): 99.1 (01 Nov 2023 04:00), Max: 99.1 (01 Nov 2023 04:00)  HR: 77 (01 Nov 2023 07:00) (63 - 101)  BP: 94/52 (01 Nov 2023 07:00) (92/53 - 108/67)  BP(mean): 71 (01 Nov 2023 07:00) (71 - 83)  RR: 20 (01 Nov 2023 09:30) (18 - 20)  SpO2: 96% (01 Nov 2023 09:30) (90% - 96%)    Parameters below as of 01 Nov 2023 09:30  Patient On (Oxygen Delivery Method): nasal cannula  O2 Flow (L/min): 4                          10.9   6.44  )-----------( 449      ( 01 Nov 2023 06:15 )             35.9                 11-01    144  |  105  |  20  ----------------------------<  129<H>  3.9   |  28  |  0.8    Ca    9.0      01 Nov 2023 06:15  Mg     2.3     11-01    TPro  6.5  /  Alb  3.2<L>  /  TBili  0.3  /  DBili  x   /  AST  30  /  ALT  39  /  AlkPhos  99  11-01      Physical Exam - Lower Extremity Focused:   Derm:   Two plantar ulcerations of R foot; full thickness; both measuring approximately 2.9cm x 2.5 x 0.4cm; probes to joint capsule/soft tissue; with heavy serous drainage. mild periwound erythema. No fluctuance. No malodors.  Stable scabs of distal ends of 2nd and 3rd toes of R foot;     Vascular: DP and PT Pulses Diminished; Foot is Warm to Warm to the touch; Capillary Refill Time < 3 Seconds;    Neuro: Protective Sensation intact to light touch    MSK: Mild Pain On Palpation at Wound Site    s/p R 1st ray amputation     Radiology:  MRI_RightFoot 10/16/23  IMPRESSION:  1. Limited exam.  2. Osteomyelitis of the first metatarsal stump.  3. Osteomyelitis of the second toe distal phalanx.    XR_RightFoot 10/15/23  Again seen is patient post transmetatarsal amputation of the first ray with periosteal reaction at the amputation stump, suspicious for recurrent osteomyelitis.  There is second toe ulcer with osseous erosion/destruction at the second distal phalangeal tuft, consistent with osteomyelitis. There is dorsal foot soft tissue swelling. Skin abnormality at the plantar aspect of the heel. Correlate for ulcer      Assessment:  Right Foot Ulcerations; Probes to bone    Plan:  Chart reviewed and Patient evaluated. All Questions and Concerns Addressed and Answered  XR/MR imaging reviewed; Osteomyelitis R 1st met stump, OM of R 2nd toe distal phalanx  Local Wound Care; Wound Flushed w/ NS; Wound Packed w/ xeroform / DSD / Kerlix   Continue with local wound care q24h;   Weight Bearing Status; WBAT  Surgical debridement indicated; Will plan for OR once patient has stabilized.   Recall Podiatry as needed;  Discussed Plan w/ attending Dr. Everett     Podiatry

## 2023-11-01 NOTE — PROGRESS NOTE ADULT - SUBJECTIVE AND OBJECTIVE BOX
Patient is a 58y old  Female who presents with a chief complaint of Pre-syncope (31 Oct 2023 17:20)        Over Night Events:  On HFNCO2.  Off pressors.          ROS:     All ROS are negative except HPI         PHYSICAL EXAM    ICU Vital Signs Last 24 Hrs  T(C): 37.3 (01 Nov 2023 04:00), Max: 37.3 (01 Nov 2023 04:00)  T(F): 99.1 (01 Nov 2023 04:00), Max: 99.1 (01 Nov 2023 04:00)  HR: 77 (01 Nov 2023 07:00) (63 - 101)  BP: 94/52 (01 Nov 2023 07:00) (92/53 - 108/67)  BP(mean): 71 (01 Nov 2023 07:00) (71 - 83)  ABP: --  ABP(mean): --  RR: 20 (01 Nov 2023 07:00) (18 - 20)  SpO2: 90% (01 Nov 2023 07:00) (90% - 94%)    O2 Parameters below as of 01 Nov 2023 07:00  Patient On (Oxygen Delivery Method): nasal cannula, high flow            CONSTITUTIONAL:  In NAD    ENT:   Airway patent,   Mouth with normal mucosa.       EYES:   Pupils equal,   Round and reactive to light.    CARDIAC:   Normal rate,   Regular rhythm.    No edema      RESPIRATORY:   No wheezing  Bilateral BS  Normal chest expansion  Not tachypneic,  No use of accessory muscles    GASTROINTESTINAL:  Abdomen soft,   Non-tender,   No guarding,   + BS    MUSCULOSKELETAL:   No clubbing, cyanosis    NEUROLOGICAL:   Alert    SKIN:   Skin normal color for race,   No evidence of rash.          LABS:                            10.9   6.44  )-----------( 449      ( 01 Nov 2023 06:15 )             35.9                                               11-01    144  |  105  |  20  ----------------------------<  129<H>  3.9   |  28  |  0.8    Ca    9.0      01 Nov 2023 06:15  Mg     2.3     11-01    TPro  6.5  /  Alb  3.2<L>  /  TBili  0.3  /  DBili  x   /  AST  30  /  ALT  39  /  AlkPhos  99  11-01                                             Urinalysis Basic - ( 01 Nov 2023 06:15 )    Color: x / Appearance: x / SG: x / pH: x  Gluc: 129 mg/dL / Ketone: x  / Bili: x / Urobili: x   Blood: x / Protein: x / Nitrite: x   Leuk Esterase: x / RBC: x / WBC x   Sq Epi: x / Non Sq Epi: x / Bacteria: x                                                  LIVER FUNCTIONS - ( 01 Nov 2023 06:15 )  Alb: 3.2 g/dL / Pro: 6.5 g/dL / ALK PHOS: 99 U/L / ALT: 39 U/L / AST: 30 U/L / GGT: x                                                                                                                                       MEDICATIONS  (STANDING):  chlorhexidine 2% Cloths 1 Application(s) Topical <User Schedule>  enoxaparin Injectable 55 milliGRAM(s) SubCutaneous every 12 hours  gabapentin 600 milliGRAM(s) Oral daily  levETIRAcetam 500 milliGRAM(s) Oral two times a day  midodrine 10 milliGRAM(s) Oral every 8 hours  multivitamin 1 Tablet(s) Oral daily  pantoprazole    Tablet 40 milliGRAM(s) Oral two times a day  polyethylene glycol 3350 17 Gram(s) Oral daily  raloxifene 60 milliGRAM(s) Oral daily    MEDICATIONS  (PRN):  acetaminophen     Tablet .. 650 milliGRAM(s) Oral every 6 hours PRN Temp greater or equal to 38C (100.4F), Mild Pain (1 - 3)  melatonin 5 milliGRAM(s) Oral at bedtime PRN Insomnia      New X-rays reviewed:                                                                                  ECHO    CXR interpreted by me:

## 2023-11-01 NOTE — SWALLOW BEDSIDE ASSESSMENT ADULT - ADDITIONAL RECOMMENDATIONS
frequent oral care
SLP services to consider upgrade at bedside as respiratory status improves. (recent MBS in August '23 w/ recs for puree/thins).
SLP services to consider upgrade at bedside as respiratory status improves. (recent MBS in August '23 w/ recs for puree/thins).
SLP to f/u as respiratory status improves.
frequent oral care
SLP services to consider upgrade at bedside as respiratory status improves. (recent MBS in August '23 w/ recs for puree/thins).
SLP services to consider upgrade at bedside as respiratory status improves. (recent MBS in August '23 w/ recs for puree/thins).
frequent oral care
SLP services to consider upgrade at bedside as respiratory status improves. (recent MBS in August '23 w/ recs for puree/thins).

## 2023-11-01 NOTE — PROGRESS NOTE ADULT - SUBJECTIVE AND OBJECTIVE BOX
24H events:    Patient is a 58y old Female who presents with a chief complaint of Pre-syncope (01 Nov 2023 10:18)  Primary diagnosis of Pre-syncope  Today is hospital day 18d. This morning patient was seen and examined at bedside, resting comfortably in bed.    No acute or major events overnight.    Code Status:    Family communication:  Contact date:  Name of person contacted:  Relationship to patient:  Communication details:  What matters most:    PAST MEDICAL & SURGICAL HISTORY  Down syndrome    Osteoporosis    Mild anemia    Neuropathy    S/P debridement  of R hip on 3/2/21      SOCIAL HISTORY:  Social History:  Lives at nursing home (14 Oct 2023 20:33)      ALLERGIES:  No Known Allergies    MEDICATIONS:  STANDING MEDICATIONS  chlorhexidine 2% Cloths 1 Application(s) Topical <User Schedule>  enoxaparin Injectable 55 milliGRAM(s) SubCutaneous every 12 hours  gabapentin 600 milliGRAM(s) Oral daily  levETIRAcetam 500 milliGRAM(s) Oral two times a day  midodrine 10 milliGRAM(s) Oral every 8 hours  multivitamin 1 Tablet(s) Oral daily  pantoprazole    Tablet 40 milliGRAM(s) Oral two times a day  polyethylene glycol 3350 17 Gram(s) Oral daily  raloxifene 60 milliGRAM(s) Oral daily    PRN MEDICATIONS  acetaminophen     Tablet .. 650 milliGRAM(s) Oral every 6 hours PRN  melatonin 5 milliGRAM(s) Oral at bedtime PRN    VITALS:   T(F): 99.1  HR: 77  BP: 94/52  RR: 20  SpO2: 96%    PHYSICAL EXAM:        GENERAL: NAD, lying in bed comfortably. on HFNC. nor verbal   HEAD:  Atraumatic, normocephalic  EYES: EOMI, PERRLA   ENT: Moist mucous membranes  NECK: Supple  HEART: Regular rate and rhythm, no murmurs  LUNGS: Unlabored respirations.  Clear to auscultation bilaterally  ABDOMEN: Soft, nontender, nondistended  EXTREMITIES: 2+ peripheral pulses bilaterally  NERVOUS SYSTEM:  awake, alert, does not follow commands            AMPAC score:    (  ) Indwelling Madsen Catheter:   Date insterted:    Reason (  ) Critical illness     (  ) urinary retention    (  ) Accurate Ins/Outs Monitoring     (  ) CMO patient    (  ) Central Line:   Date inserted:  Location: (  ) Right IJ     (  ) Left IJ     (  ) Right Fem     (  ) Left Fem    (  ) SPC        (  ) pigtail       (  ) PEG tube       (  ) colostomy       (  ) jejunostomy  (  ) U-Dall    LABS:                        10.9   6.44  )-----------( 449      ( 01 Nov 2023 06:15 )             35.9     11-01    144  |  105  |  20  ----------------------------<  129<H>  3.9   |  28  |  0.8    Ca    9.0      01 Nov 2023 06:15  Mg     2.3     11-01    TPro  6.5  /  Alb  3.2<L>  /  TBili  0.3  /  DBili  x   /  AST  30  /  ALT  39  /  AlkPhos  99  11-01      Urinalysis Basic - ( 01 Nov 2023 06:15 )    Color: x / Appearance: x / SG: x / pH: x  Gluc: 129 mg/dL / Ketone: x  / Bili: x / Urobili: x   Blood: x / Protein: x / Nitrite: x   Leuk Esterase: x / RBC: x / WBC x   Sq Epi: x / Non Sq Epi: x / Bacteria: x                RADIOLOGY:

## 2023-11-01 NOTE — PROGRESS NOTE ADULT - ASSESSMENT
a/p:  58 year old female with a PMHx of Down syndrome, nonverbal at baseline, hypothyroidism, cerebral palsy, congenital pulmonary stenosis, Seizures, presents to the ED from nursing facility for syncope associated with tonic-clonic movement witnessed by aide. Initial vitals at nursing home showed bradycardia and hypoxia for about 10-15 minutes. No fevers, chill, cough, vomiting, diarrhea, difficulty breathing.    Pt initialy was admitted to medical floor then on 10/17 was upgraded to ICU for respiratory distress and hypotension and now downgraded to CEU.    #Acute hypoxemic respiratory failure-2/2 Aspiration pneumonia  #DVT-nonocclusive of L common femoral vein  -continue management in the SDU  -pulmonary/critical care following  -cont o2 suppl---try to wean off HFNC if toelrates- NIV prn - goal o2 sat >90%  -daily cxr  -aggressive pulm suctioning and chest PT  -therapeutic lovenox 55 mg sq bid  -completed abx course- merrem and caspo    #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 and mutliple L foot deep tissue injuries  -podiatry follow up for eventual surgical intervention  -pain control  -s/p completed course of abx (meropenem and caspo)  -frequent turning/off loading and position change as per protocol with local wound/skin care    #Chronic barlow for urinary retention     #Seizure- abnormal VEEG  -monitor neurochecks  -neurology following  -continue with keppra and f/u level    DVT/GI ppx  guarded prognosis    FULL CODE    Total time spent to complete patient's bedside assessment, review medical chart, discuss medical plan of care with covering medical team was more than 50 minutes  with >50% of time spendt face to face with patient, discussion with patient/family and/or coordination of care

## 2023-11-01 NOTE — PROGRESS NOTE ADULT - ASSESSMENT
IMPRESSION:    Acute hypoxemic respiratory failure sp extubation on HHFNC  Aspiration pneumonia  DVT   Elevated Fungitell   HO recent duodenal perforation   foot OM  HO polymicrobial bacteremia   H/O CP  H/o seizures    PLAN:    CNS: Avoid CNS depressants     HEENT: Oral care.      PULMONARY: HOB at 45 degrees.  Aspiration precaution.  Wean O2 as tolerated.  Keep SaO2 92 TO 96%.  Encourage NIV during sleep.  Pulmonary toilet.  Repeat CXR noted      CARDIOVASCULAR: Goal directed fluid resuscitation.      GI: Protonix BID.  Feeding per speech 1:1.  Bowel regimen.       RENAL:  FU lytes.  Correct as needed.      INFECTIOUS DISEASE:  Monitor VS>      HEMATOLOGICAL: DVT therapy.  Monitor CBC. Lovenox therapeutic    ENDOCRINE:  Follow up FS.  Insulin protocol if needed.    MUSCULOSKELETAL: Bedrest.  Off loading.  Wound care.  Podiatry following     Prognosis guarded.      SDU

## 2023-11-01 NOTE — CHART NOTE - NSCHARTNOTEFT_GEN_A_CORE
Registered Dietitian Follow-Up     Patient Profile Reviewed                           Yes [x]   No []    Pertinent Subjective Information:  Spoke with PCA concerning patient's PO intake - Patient is tolerating diet well and consuming >75% of meals.     Pertinent Medical Interventions:  Acute hypoxemic respiratory failure s/p intubation; Aspiration PNA; h/o recent duodenal perforation; Osteomyelitis, right foot; h/o seizures, no on AED;     SLP swallow evaluation : continue puree, mildly thick liquids; 1:1 feed     Diet order:   Diet, Pureed:   Mildly Thick Liquids (MILDTHICKLIQS) (10-24-23 @ 09:28) [Active]    Anthropometrics:  Height: 144.8 cm   Weight (kg): 56.1 (10-31-23 @ 08:30)  BMI: 26.8  IBW: 38.6 kg     Daily Weight in k.4 (10-30), Weight in k.2 (10-30), Weight in k.2 (10-29)  % Weight Change    MEDICATIONS  (STANDING):  chlorhexidine 2% Cloths 1 Application(s) Topical <User Schedule>  enoxaparin Injectable 55 milliGRAM(s) SubCutaneous every 12 hours  gabapentin 600 milliGRAM(s) Oral daily  levETIRAcetam 500 milliGRAM(s) Oral two times a day  midodrine 10 milliGRAM(s) Oral every 8 hours  multivitamin 1 Tablet(s) Oral daily  pantoprazole    Tablet 40 milliGRAM(s) Oral two times a day  polyethylene glycol 3350 17 Gram(s) Oral daily  raloxifene 60 milliGRAM(s) Oral daily    MEDICATIONS  (PRN):  acetaminophen     Tablet .. 650 milliGRAM(s) Oral every 6 hours PRN Temp greater or equal to 38C (100.4F), Mild Pain (1 - 3)  melatonin 5 milliGRAM(s) Oral at bedtime PRN Insomnia    Pertinent Labs:  @ 06:15: Na 144, BUN 20, Cr 0.8, <H>, K+ 3.9, Phos --, Mg 2.3, Alk Phos 99, ALT/SGPT 39, AST/SGOT 30, HbA1c --    Finger Sticks:    Physical Findings:  - Appearance: AAOx4  - GI function: last BM documented 10/31   - Tubes: n/a - no feeding tubes   - Oral/Mouth cavity: pureed, mildly thick   - Skin: per WOCN note 10/18 - multiple DTI to left foot  - Edema: generalized 1+ edema     Nutrition Requirements:  Weight Used: lowest weight recorded in this admission 53.5 kg     Estimated Energy Needs    Continue [x]  Adjust []  Energy Recommendations: 5323-6702 kcal/day - MSJ 1065*SF 1.2-1.3    Estimated Protein Needs    Continue [x]  Adjust []  Protein Recommendations: 64-75 gm/day - 1.2-1.4g/kg     Estimated Fluid Needs        Continue [x]  Adjust []  Fluid Recommendations: 1605 mL/day - 30ml/kg    Nutrient Intake:  Good PO intake >75% of meal s     [x] Previous Nutrition Diagnosis:  Inadequate Oral Intake             [] Ongoing          [x] Resolved     Nutrition Intervention:  meals and snacks, coordination of care     Goal/Expected Outcome:   PO intake >75% in 5-7 days      Indicator/Monitoring:   energy intake, weight, labs, skin status, NFPE      Recommendation:  1) Continue current diet order  3) Continue 1:1 feeding assistance with all meals, snacks, supplement   4) Monitor electrolytes, renal profile, BG    Patient is at high nutrition risk, RD to f/u in 5-7 days or PRN    RD to remain available: Tania Mclean RD [phone] or via Teams

## 2023-11-01 NOTE — PROGRESS NOTE ADULT - SUBJECTIVE AND OBJECTIVE BOX
Patient is a 58y old  Female who presents with a chief complaint of Pre-syncope (01 Nov 2023 11:10)    HPI:  Patient is a 58 year old female with a PMHx of Down syndrome, nonverbal at baseline, hypothyroidism, cerebral palsy, congenital pulmonary stenosis, Seizures, presents to the ED from nursing facility for syncope associated with tonic-clonic movement witnessed by aide. Initial vitals at nursing home showed bradycardia and hypoxia for about 10-15 minutes. No fevers, chill, cough, vomiting, diarrhea, difficulty breathing.     Hospital course 9/29/2023 -> 10/13/2023 Patient was admitted  for hemoptysis and duodenal perforation requiring intubation and ICU admission for hypovolemic and septic shock requiring temporary pressor support, which resolved. Patient also had acute hypoxemic respiratory failure likely due to multifocal pneumonia. Patient was successfully extubated and completed a course of antibiotics per ID, further infectious workup was unremarkable. Repeat CTAP with contrast showed no contrast extravasation and previous seen foci of retroperitoneal gas no longer identified. The patient was evaluated by surgery and GI, and no acute intervention recommended. Patient with stable hemoglobin. Hospital course complicated by NELA likely prerenal which resolved and NSTEMI likely type II due to shock (Echo 9/30 EF 55 to 60%, Normal left systolic function Diastolic function could not be assessed) with subsequent downtrending trops (0.34 --> 0.11). Patient's hospital course ICU --> Stepdown --> General medical floor. Patient remains hemodynamically stable on general medical, tolerating pureed feeds, and has bowel movements. Patient was discharged on 10/13/2023     (14 Oct 2023 20:33)    PAST MEDICAL & SURGICAL HISTORY:  Down syndrome  Osteoporosis  Mild anemia  Neuropathy  S/P debridement  of R hip on 3/2/21    patient seen and examined independently on morning rounds for the first time today, chart reviewed and discussed with the medicine resident and the SDU team on interdisciplinary rounds.    Hospital Day #18    remains in SDU on HFNC---with o2 sat 96-98%---no overnight events-     Vital Signs Last 24 Hrs  T(C): 36.7 (01 Nov 2023 17:30), Max: 37.3 (01 Nov 2023 04:00)  T(F): 98 (01 Nov 2023 17:30), Max: 99.1 (01 Nov 2023 04:00)  HR: 59 (01 Nov 2023 17:30) (59 - 77)  BP: 119/59 (01 Nov 2023 17:52) (88/51 - 119/59)  BP(mean): 63 (01 Nov 2023 17:30) (63 - 78)  RR: 20 (01 Nov 2023 17:30) (18 - 20)  SpO2: 100% (01 Nov 2023 17:30) (90% - 100%)    Parameters below as of 01 Nov 2023 17:30  Patient On (Oxygen Delivery Method): nasal cannula, high flow    PE:  GEN-NAD, alert and awake--nonverbal- appears comfortable  PULM- fair air entry, decreased bs bilateral bases  CVS- +s1/s2 rrr  GI- soft NT ND +bs  EXT- no edema                          10.9   6.44  )-----------( 449      ( 01 Nov 2023 06:15 )             35.9     11-01    144  |  105  |  20  ----------------------------<  129<H>  3.9   |  28  |  0.8    Ca    9.0      01 Nov 2023 06:15  Mg     2.3     11-01    TPro  6.5  /  Alb  3.2<L>  /  TBili  0.3  /  DBili  x   /  AST  30  /  ALT  39  /  AlkPhos  99  11-01        Urinalysis Basic - ( 01 Nov 2023 06:15 )    Color: x / Appearance: x / SG: x / pH: x  Gluc: 129 mg/dL / Ketone: x  / Bili: x / Urobili: x   Blood: x / Protein: x / Nitrite: x   Leuk Esterase: x / RBC: x / WBC x   Sq Epi: x / Non Sq Epi: x / Bacteria: x          MEDICATIONS  (STANDING):  chlorhexidine 2% Cloths 1 Application(s) Topical <User Schedule>  enoxaparin Injectable 55 milliGRAM(s) SubCutaneous every 12 hours  gabapentin 600 milliGRAM(s) Oral daily  levETIRAcetam 500 milliGRAM(s) Oral two times a day  midodrine 10 milliGRAM(s) Oral every 8 hours  multivitamin 1 Tablet(s) Oral daily  pantoprazole    Tablet 40 milliGRAM(s) Oral two times a day  polyethylene glycol 3350 17 Gram(s) Oral daily  raloxifene 60 milliGRAM(s) Oral daily

## 2023-11-01 NOTE — PROGRESS NOTE ADULT - ASSESSMENT
Patient is a 58 year old female with a PMHx of Down syndrome, nonverbal at baseline, hypothyroidism, cerebral palsy, congenital pulmonary stenosis, Seizures, presents to the ED from nursing facility for syncope associated with tonic-clonic movement witnessed by aide. Initial vitals at nursing home showed bradycardia and hypoxia for about 10-15 minutes. No fevers, chill, cough, vomiting, diarrhea, difficulty breathing.     Hospital course 9/29/2023 -> 10/13/2023 Patient was admitted  for hemoptysis and duodenal perforation requiring intubation and ICU admission for hypovolemic and septic shock requiring temporary pressor support, which resolved. Patient also had acute hypoxemic respiratory failure likely due to multifocal pneumonia. Patient was successfully extubated and completed a course of antibiotics per ID, further infectious workup was unremarkable. Repeat CTAP with contrast showed no contrast extravasation and previous seen foci of retroperitoneal gas no longer identified. The patient was evaluated by surgery and GI, and no acute intervention recommended. Patient with stable hemoglobin. Hospital course complicated by NELA likely prerenal which resolved and NSTEMI likely type II due to shock (Echo 9/30 EF 55 to 60%, Normal left systolic function Diastolic function could not be assessed) with subsequent downtrending trops (0.34 --> 0.11). Patient's hospital course ICU --> Stepdown --> General medical floor. Patient remains hemodynamically stable on general medical, tolerating pureed feeds, and has bowel movements. Patient was discharged on 10/13/2023     Arrive to ED 10/14/2023 from nursing facility for syncope associated with tonic-clonic movement witnessed by aide. Initial vitals at nursing home showed bradycardia and hypoxia for about 10-15 minutes. No fevers, chill, cough, vomiting, diarrhea, difficulty breathing.      10/17/23: Patient had possible aspiration episode this morning around 6am (per night resident), and was desaturating to low 80s, Cxr showed worsening lower lobe pna > transitioned to nonrebreather > maxed out on nonrebreather satting at 91%, BP 88/52, using accessory muscles > transitioned to high flow > no improvement in SpO2, BP 80s/50s > RR called > anesthesia intubated > peripheral IV access acquired and started levophed, and precedex > BP 90s/50s > gave propofol during central line placement > levophed transitioned to central line. Patient is stable and will be transferred to ICU.    MICU course:  Patient arrived to ICU intubated and sedated. Patient on levophed, then weaned off pressors and started on midodrine. Patient initially on precedex, then fentanyl, then weaned off sedation. Serial CXR showing grossly stable b/l lung opacities. Patient successfully extubated on 10/21, transitioned to nasal cannula. Patient desatting on NC, transitioned to HFNC, satting well.   Patient found to have DVT in left common fem and was started on therapeutic Lovenox. Hemoglobin and BP have been stable while on full AC (patient has recent h/o duodenal perforation). CT angio done 10/18 negative for PE.   ID is following, patient on meropenem for PNA. Fungitell elevated, patient started on caspofungin. Meropenem discontinued on 10/24.   On 10/17 patient noted to have episode of whole body rhythmic shaking, left arm jerking, abnormal eye movements. 2mg ativan given. vEEG showed generalized slowing, diffusely expressed triphasic waves, right posterior quadrant sharp waves that are probably epileptiform and sharp transients, small number of stimulus-dependent posterior periodic dischargees. Per neuro patient started on Keppra 500mg bid.   Patient has osteomyelitis of right foot. Xray right foot showed post transmetatarsal amputation of the first ray with periosteal reaction at the amputation stump, suspicious for recurrent osteomyelitis. There is second toe ulcer with osseous erosion/destruction at the second distal phalangeal tuft, consistent with osteomyelitis. MR right foot - 1.  Limited exam. 2.  Osteomyelitis of the first metatarsal stump. 3.  Osteomyelitis of the second toe distal phalanx. Podiatry is following, was initially scheduled for OR debridement on 10/20, which was cancelled d/t patient condition. Podiatry will reassess this week and reschedule surgery.   Speech and swallow evaluated patient - initially patient not appropriate for po trials, continue with NPO with tube feeds. Following speech and swallow re-eval, patient started on pureed with mildly thick liquids.     #Acute hypoxemic respiratory failure s/p intubation  #Aspiration pneumonia   #DVT left common fem on full AC   -Pt being treated for multilobar pna this admission. Possible aspiration event on 10/17 AM, RR called for desats, no improvement on nonrebreather>HFNC>anesthesia intubated patient on floor. R IJ placed, patient started on levo and precedex.   -Pt now off pressors   -midodrine 10 q8  -CXR - Stable bilateral lung opacities  -Duplex 10/17 shows DVT in Left common fem - pt on Lovenox 50mg bid  -CT angio 10/18 - No CT evidence of pulmonary embolism. Bilateral pneumonia.  -Hb stable  -bowel regimen, pt having BM   -Fungitell positive >500  -ID following, on caspofungin. Dc meropenem (received for 7 days).  -Patient on HFNC..  -wean O2  -NIV during sleep   -dc hydrocortisone   -f/u S+S :       #H/o recent duodenal perforation  -pt on GI ppx  -Pt on therapeutic lovenox for DVT  -Hb stable     #Osteomyelitis, right foot   -Patient with multiple decubitus ulcers on her right foot, less on left foot.   -Xray right foot showed post transmetatarsal amputation of the first ray with periosteal reaction at the amputation stump, suspicious for recurrent osteomyelitis. There is second toe ulcer with osseous erosion/destruction at the second distal phalangeal tuft, consistent with osteomyelitis. There is dorsal   -MR right foot - 1.  Limited exam. 2.  Osteomyelitis of the first metatarsal stump. 3.  Osteomyelitis of the second toe distal phalanx.  -podiatry following, OR for debridement - was scheduled for Friday 10/20 - cancelled and will reassess. Surgical debridement indicated; Will plan for OR once patient has stabilized and off oxygen.     #H/o seizures, not on AED  -On day of admission pt had tonic-clonic movements witnessed by aide  -neuro consulted - Episode suspicious for cardiac event leading to drop in BP and HR. Unclear if epileptic seizure.  -EEG showed generalized slowing, no seizure activity   -OVN 10/17 RN noted another episode, lasted around 30sec with whole body rhythmic shaking, left arm jerking, abnormal eye movements. 2mg ativan given.   -vEEG showed generalized slowing, diffusely expressed triphasic waves, right posterior quadrant sharp waves that are probably epileptiform and sharp transients, small number of stimulus-dependent posterior periodic dischargees.   -Per neuro patient started on Keppra 500mg bid.         Misc:  Diet -  Pureed diet   Activity - bedrest  DVT ppx - therapeutic lovenox   Dispo - sdu  code- full

## 2023-11-02 LAB
ALBUMIN SERPL ELPH-MCNC: 3.1 G/DL — LOW (ref 3.5–5.2)
ALBUMIN SERPL ELPH-MCNC: 3.1 G/DL — LOW (ref 3.5–5.2)
ALP SERPL-CCNC: 96 U/L — SIGNIFICANT CHANGE UP (ref 30–115)
ALP SERPL-CCNC: 96 U/L — SIGNIFICANT CHANGE UP (ref 30–115)
ALT FLD-CCNC: 33 U/L — SIGNIFICANT CHANGE UP (ref 0–41)
ALT FLD-CCNC: 33 U/L — SIGNIFICANT CHANGE UP (ref 0–41)
ANION GAP SERPL CALC-SCNC: 12 MMOL/L — SIGNIFICANT CHANGE UP (ref 7–14)
ANION GAP SERPL CALC-SCNC: 12 MMOL/L — SIGNIFICANT CHANGE UP (ref 7–14)
AST SERPL-CCNC: 26 U/L — SIGNIFICANT CHANGE UP (ref 0–41)
AST SERPL-CCNC: 26 U/L — SIGNIFICANT CHANGE UP (ref 0–41)
BASOPHILS # BLD AUTO: 0.09 K/UL — SIGNIFICANT CHANGE UP (ref 0–0.2)
BASOPHILS # BLD AUTO: 0.09 K/UL — SIGNIFICANT CHANGE UP (ref 0–0.2)
BASOPHILS NFR BLD AUTO: 1.6 % — HIGH (ref 0–1)
BASOPHILS NFR BLD AUTO: 1.6 % — HIGH (ref 0–1)
BILIRUB SERPL-MCNC: 0.2 MG/DL — SIGNIFICANT CHANGE UP (ref 0.2–1.2)
BILIRUB SERPL-MCNC: 0.2 MG/DL — SIGNIFICANT CHANGE UP (ref 0.2–1.2)
BUN SERPL-MCNC: 18 MG/DL — SIGNIFICANT CHANGE UP (ref 10–20)
BUN SERPL-MCNC: 18 MG/DL — SIGNIFICANT CHANGE UP (ref 10–20)
CALCIUM SERPL-MCNC: 9 MG/DL — SIGNIFICANT CHANGE UP (ref 8.4–10.5)
CALCIUM SERPL-MCNC: 9 MG/DL — SIGNIFICANT CHANGE UP (ref 8.4–10.5)
CHLORIDE SERPL-SCNC: 105 MMOL/L — SIGNIFICANT CHANGE UP (ref 98–110)
CHLORIDE SERPL-SCNC: 105 MMOL/L — SIGNIFICANT CHANGE UP (ref 98–110)
CO2 SERPL-SCNC: 27 MMOL/L — SIGNIFICANT CHANGE UP (ref 17–32)
CO2 SERPL-SCNC: 27 MMOL/L — SIGNIFICANT CHANGE UP (ref 17–32)
CREAT SERPL-MCNC: 0.8 MG/DL — SIGNIFICANT CHANGE UP (ref 0.7–1.5)
CREAT SERPL-MCNC: 0.8 MG/DL — SIGNIFICANT CHANGE UP (ref 0.7–1.5)
EGFR: 86 ML/MIN/1.73M2 — SIGNIFICANT CHANGE UP
EGFR: 86 ML/MIN/1.73M2 — SIGNIFICANT CHANGE UP
EOSINOPHIL # BLD AUTO: 0.26 K/UL — SIGNIFICANT CHANGE UP (ref 0–0.7)
EOSINOPHIL # BLD AUTO: 0.26 K/UL — SIGNIFICANT CHANGE UP (ref 0–0.7)
EOSINOPHIL NFR BLD AUTO: 4.5 % — SIGNIFICANT CHANGE UP (ref 0–8)
EOSINOPHIL NFR BLD AUTO: 4.5 % — SIGNIFICANT CHANGE UP (ref 0–8)
GLUCOSE SERPL-MCNC: 129 MG/DL — HIGH (ref 70–99)
GLUCOSE SERPL-MCNC: 129 MG/DL — HIGH (ref 70–99)
HCT VFR BLD CALC: 37.1 % — SIGNIFICANT CHANGE UP (ref 37–47)
HCT VFR BLD CALC: 37.1 % — SIGNIFICANT CHANGE UP (ref 37–47)
HGB BLD-MCNC: 11.3 G/DL — LOW (ref 12–16)
HGB BLD-MCNC: 11.3 G/DL — LOW (ref 12–16)
IMM GRANULOCYTES NFR BLD AUTO: 0.2 % — SIGNIFICANT CHANGE UP (ref 0.1–0.3)
IMM GRANULOCYTES NFR BLD AUTO: 0.2 % — SIGNIFICANT CHANGE UP (ref 0.1–0.3)
LYMPHOCYTES # BLD AUTO: 1.58 K/UL — SIGNIFICANT CHANGE UP (ref 1.2–3.4)
LYMPHOCYTES # BLD AUTO: 1.58 K/UL — SIGNIFICANT CHANGE UP (ref 1.2–3.4)
LYMPHOCYTES # BLD AUTO: 27.6 % — SIGNIFICANT CHANGE UP (ref 20.5–51.1)
LYMPHOCYTES # BLD AUTO: 27.6 % — SIGNIFICANT CHANGE UP (ref 20.5–51.1)
MAGNESIUM SERPL-MCNC: 2.2 MG/DL — SIGNIFICANT CHANGE UP (ref 1.8–2.4)
MAGNESIUM SERPL-MCNC: 2.2 MG/DL — SIGNIFICANT CHANGE UP (ref 1.8–2.4)
MCHC RBC-ENTMCNC: 24.5 PG — LOW (ref 27–31)
MCHC RBC-ENTMCNC: 24.5 PG — LOW (ref 27–31)
MCHC RBC-ENTMCNC: 30.5 G/DL — LOW (ref 32–37)
MCHC RBC-ENTMCNC: 30.5 G/DL — LOW (ref 32–37)
MCV RBC AUTO: 80.5 FL — LOW (ref 81–99)
MCV RBC AUTO: 80.5 FL — LOW (ref 81–99)
MONOCYTES # BLD AUTO: 0.48 K/UL — SIGNIFICANT CHANGE UP (ref 0.1–0.6)
MONOCYTES # BLD AUTO: 0.48 K/UL — SIGNIFICANT CHANGE UP (ref 0.1–0.6)
MONOCYTES NFR BLD AUTO: 8.4 % — SIGNIFICANT CHANGE UP (ref 1.7–9.3)
MONOCYTES NFR BLD AUTO: 8.4 % — SIGNIFICANT CHANGE UP (ref 1.7–9.3)
NEUTROPHILS # BLD AUTO: 3.3 K/UL — SIGNIFICANT CHANGE UP (ref 1.4–6.5)
NEUTROPHILS # BLD AUTO: 3.3 K/UL — SIGNIFICANT CHANGE UP (ref 1.4–6.5)
NEUTROPHILS NFR BLD AUTO: 57.7 % — SIGNIFICANT CHANGE UP (ref 42.2–75.2)
NEUTROPHILS NFR BLD AUTO: 57.7 % — SIGNIFICANT CHANGE UP (ref 42.2–75.2)
NRBC # BLD: 0 /100 WBCS — SIGNIFICANT CHANGE UP (ref 0–0)
NRBC # BLD: 0 /100 WBCS — SIGNIFICANT CHANGE UP (ref 0–0)
PLATELET # BLD AUTO: 399 K/UL — SIGNIFICANT CHANGE UP (ref 130–400)
PLATELET # BLD AUTO: 399 K/UL — SIGNIFICANT CHANGE UP (ref 130–400)
PMV BLD: 9.8 FL — SIGNIFICANT CHANGE UP (ref 7.4–10.4)
PMV BLD: 9.8 FL — SIGNIFICANT CHANGE UP (ref 7.4–10.4)
POTASSIUM SERPL-MCNC: 4.1 MMOL/L — SIGNIFICANT CHANGE UP (ref 3.5–5)
POTASSIUM SERPL-MCNC: 4.1 MMOL/L — SIGNIFICANT CHANGE UP (ref 3.5–5)
POTASSIUM SERPL-SCNC: 4.1 MMOL/L — SIGNIFICANT CHANGE UP (ref 3.5–5)
POTASSIUM SERPL-SCNC: 4.1 MMOL/L — SIGNIFICANT CHANGE UP (ref 3.5–5)
PROT SERPL-MCNC: 6.4 G/DL — SIGNIFICANT CHANGE UP (ref 6–8)
PROT SERPL-MCNC: 6.4 G/DL — SIGNIFICANT CHANGE UP (ref 6–8)
RBC # BLD: 4.61 M/UL — SIGNIFICANT CHANGE UP (ref 4.2–5.4)
RBC # BLD: 4.61 M/UL — SIGNIFICANT CHANGE UP (ref 4.2–5.4)
RBC # FLD: SIGNIFICANT CHANGE UP % (ref 11.5–14.5)
RBC # FLD: SIGNIFICANT CHANGE UP % (ref 11.5–14.5)
SODIUM SERPL-SCNC: 144 MMOL/L — SIGNIFICANT CHANGE UP (ref 135–146)
SODIUM SERPL-SCNC: 144 MMOL/L — SIGNIFICANT CHANGE UP (ref 135–146)
WBC # BLD: 5.72 K/UL — SIGNIFICANT CHANGE UP (ref 4.8–10.8)
WBC # BLD: 5.72 K/UL — SIGNIFICANT CHANGE UP (ref 4.8–10.8)
WBC # FLD AUTO: 5.72 K/UL — SIGNIFICANT CHANGE UP (ref 4.8–10.8)
WBC # FLD AUTO: 5.72 K/UL — SIGNIFICANT CHANGE UP (ref 4.8–10.8)

## 2023-11-02 PROCEDURE — 93010 ELECTROCARDIOGRAM REPORT: CPT

## 2023-11-02 PROCEDURE — 99233 SBSQ HOSP IP/OBS HIGH 50: CPT

## 2023-11-02 PROCEDURE — 71045 X-RAY EXAM CHEST 1 VIEW: CPT | Mod: 26

## 2023-11-02 RX ORDER — SODIUM CHLORIDE 9 MG/ML
1000 INJECTION, SOLUTION INTRAVENOUS
Refills: 0 | Status: DISCONTINUED | OUTPATIENT
Start: 2023-11-02 | End: 2023-11-06

## 2023-11-02 RX ORDER — SODIUM CHLORIDE 9 MG/ML
500 INJECTION INTRAMUSCULAR; INTRAVENOUS; SUBCUTANEOUS ONCE
Refills: 0 | Status: COMPLETED | OUTPATIENT
Start: 2023-11-02 | End: 2023-11-02

## 2023-11-02 RX ORDER — ACETAMINOPHEN 500 MG
650 TABLET ORAL EVERY 12 HOURS
Refills: 0 | Status: DISCONTINUED | OUTPATIENT
Start: 2023-11-02 | End: 2023-11-10

## 2023-11-02 RX ADMIN — RALOXIFENE HYDROCHLORIDE 60 MILLIGRAM(S): 60 TABLET, COATED ORAL at 12:03

## 2023-11-02 RX ADMIN — SODIUM CHLORIDE 75 MILLILITER(S): 9 INJECTION, SOLUTION INTRAVENOUS at 12:05

## 2023-11-02 RX ADMIN — MIDODRINE HYDROCHLORIDE 10 MILLIGRAM(S): 2.5 TABLET ORAL at 13:01

## 2023-11-02 RX ADMIN — Medication 1 TABLET(S): at 12:03

## 2023-11-02 RX ADMIN — PANTOPRAZOLE SODIUM 40 MILLIGRAM(S): 20 TABLET, DELAYED RELEASE ORAL at 05:30

## 2023-11-02 RX ADMIN — ENOXAPARIN SODIUM 55 MILLIGRAM(S): 100 INJECTION SUBCUTANEOUS at 17:14

## 2023-11-02 RX ADMIN — SODIUM CHLORIDE 500 MILLILITER(S): 9 INJECTION INTRAMUSCULAR; INTRAVENOUS; SUBCUTANEOUS at 17:10

## 2023-11-02 RX ADMIN — Medication 650 MILLIGRAM(S): at 17:15

## 2023-11-02 RX ADMIN — ENOXAPARIN SODIUM 55 MILLIGRAM(S): 100 INJECTION SUBCUTANEOUS at 05:28

## 2023-11-02 RX ADMIN — MIDODRINE HYDROCHLORIDE 10 MILLIGRAM(S): 2.5 TABLET ORAL at 05:28

## 2023-11-02 RX ADMIN — GABAPENTIN 600 MILLIGRAM(S): 400 CAPSULE ORAL at 12:03

## 2023-11-02 RX ADMIN — CHLORHEXIDINE GLUCONATE 1 APPLICATION(S): 213 SOLUTION TOPICAL at 05:28

## 2023-11-02 RX ADMIN — Medication 650 MILLIGRAM(S): at 17:45

## 2023-11-02 RX ADMIN — LEVETIRACETAM 500 MILLIGRAM(S): 250 TABLET, FILM COATED ORAL at 05:29

## 2023-11-02 RX ADMIN — MIDODRINE HYDROCHLORIDE 10 MILLIGRAM(S): 2.5 TABLET ORAL at 21:57

## 2023-11-02 RX ADMIN — LEVETIRACETAM 500 MILLIGRAM(S): 250 TABLET, FILM COATED ORAL at 17:14

## 2023-11-02 RX ADMIN — POLYETHYLENE GLYCOL 3350 17 GRAM(S): 17 POWDER, FOR SOLUTION ORAL at 12:04

## 2023-11-02 RX ADMIN — PANTOPRAZOLE SODIUM 40 MILLIGRAM(S): 20 TABLET, DELAYED RELEASE ORAL at 17:14

## 2023-11-02 NOTE — PROGRESS NOTE ADULT - SUBJECTIVE AND OBJECTIVE BOX
Patient is a 58y old  Female who presents with a chief complaint of Pre-syncope (01 Nov 2023 18:07)        SUBJECTIVE: On NC 4L. Off pressors.      REVIEW OF SYSTEMS:    All Pertinent ROS are negative except per HPI       PHYSICAL EXAM  Vital Signs Last 24 Hrs  T(C): 36.7 (02 Nov 2023 07:54), Max: 36.9 (02 Nov 2023 04:00)  T(F): 98.1 (02 Nov 2023 07:54), Max: 98.5 (02 Nov 2023 04:00)  HR: 73 (02 Nov 2023 07:54) (59 - 91)  BP: 120/58 (02 Nov 2023 07:54) (88/51 - 134/71)  BP(mean): 83 (02 Nov 2023 07:54) (63 - 83)  RR: 20 (02 Nov 2023 07:54) (18 - 20)  SpO2: 99% (02 Nov 2023 07:54) (96% - 100%)    Parameters below as of 02 Nov 2023 07:54  Patient On (Oxygen Delivery Method): nasal cannula        CONSTITUTIONAL:  NAD    ENT:   Airway patent,     CARDIAC:   Tachy  regular rhythm.      RESPIRATORY:   NO Wheezing  Normal chest expansion  Not tachypneic,  No use of accessory muscles    GASTROINTESTINAL:  Abdomen soft,   non-tender,   no guarding,   + BS    MUSCULOSKELETAL:   range of motion is not limited,  no clubbing, cyanosis    NEUROLOGICAL:   Alert     SKIN:   Skin normal color for race,   warm, dry         11-01-23 @ 07:01  -  11-02-23 @ 07:00  --------------------------------------------------------  IN:    Oral Fluid: 210 mL  Total IN: 210 mL    OUT:    Indwelling Catheter - Urethral (mL): 750 mL  Total OUT: 750 mL    Total NET: -540 mL          LABS:                          11.3   5.72  )-----------( 399      ( 02 Nov 2023 06:08 )             37.1                                               11-02    144  |  105  |  18  ----------------------------<  129<H>  4.1   |  27  |  0.8    Ca    9.0      02 Nov 2023 06:08  Mg     2.2     11-02    TPro  6.4  /  Alb  3.1<L>  /  TBili  0.2  /  DBili  x   /  AST  26  /  ALT  33  /  AlkPhos  96  11-02                                             Urinalysis Basic - ( 02 Nov 2023 06:08 )    Color: x / Appearance: x / SG: x / pH: x  Gluc: 129 mg/dL / Ketone: x  / Bili: x / Urobili: x   Blood: x / Protein: x / Nitrite: x   Leuk Esterase: x / RBC: x / WBC x   Sq Epi: x / Non Sq Epi: x / Bacteria: x                                                  LIVER FUNCTIONS - ( 02 Nov 2023 06:08 )  Alb: 3.1 g/dL / Pro: 6.4 g/dL / ALK PHOS: 96 U/L / ALT: 33 U/L / AST: 26 U/L / GGT: x                                                                                                MEDICATIONS  (STANDING):  chlorhexidine 2% Cloths 1 Application(s) Topical <User Schedule>  enoxaparin Injectable 55 milliGRAM(s) SubCutaneous every 12 hours  gabapentin 600 milliGRAM(s) Oral daily  levETIRAcetam 500 milliGRAM(s) Oral two times a day  midodrine 10 milliGRAM(s) Oral every 8 hours  multivitamin 1 Tablet(s) Oral daily  pantoprazole    Tablet 40 milliGRAM(s) Oral two times a day  polyethylene glycol 3350 17 Gram(s) Oral daily  raloxifene 60 milliGRAM(s) Oral daily    MEDICATIONS  (PRN):  acetaminophen     Tablet .. 650 milliGRAM(s) Oral every 6 hours PRN Temp greater or equal to 38C (100.4F), Mild Pain (1 - 3)  melatonin 5 milliGRAM(s) Oral at bedtime PRN Insomnia      X-Rays reviewed    CXR interpreted by me: Patient is a 58y old  Female who presents with a chief complaint of Pre-syncope (01 Nov 2023 18:07)        SUBJECTIVE: On NC 4L. Off pressors.      REVIEW OF SYSTEMS:    All Pertinent ROS are negative except per HPI       PHYSICAL EXAM  Vital Signs Last 24 Hrs  T(C): 36.7 (02 Nov 2023 07:54), Max: 36.9 (02 Nov 2023 04:00)  T(F): 98.1 (02 Nov 2023 07:54), Max: 98.5 (02 Nov 2023 04:00)  HR: 73 (02 Nov 2023 07:54) (59 - 91)  BP: 120/58 (02 Nov 2023 07:54) (88/51 - 134/71)  BP(mean): 83 (02 Nov 2023 07:54) (63 - 83)  RR: 20 (02 Nov 2023 07:54) (18 - 20)  SpO2: 99% (02 Nov 2023 07:54) (96% - 100%)    Parameters below as of 02 Nov 2023 07:54  Patient On (Oxygen Delivery Method): nasal cannula        CONSTITUTIONAL:  NAD    ENT:   Airway patent,     CARDIAC:   Tachy  regular rhythm.      RESPIRATORY:   NO Wheezing  Normal chest expansion  Not tachypneic,  No use of accessory muscles    GASTROINTESTINAL:  Abdomen soft,   non-tender,   no guarding,   + BS    MUSCULOSKELETAL:   range of motion is not limited,  no clubbing, cyanosis    NEUROLOGICAL:   Alert     SKIN:   Skin normal color for race,   warm, dry         11-01-23 @ 07:01  -  11-02-23 @ 07:00  --------------------------------------------------------  IN:    Oral Fluid: 210 mL  Total IN: 210 mL    OUT:    Indwelling Catheter - Urethral (mL): 750 mL  Total OUT: 750 mL    Total NET: -540 mL          LABS:                          11.3   5.72  )-----------( 399      ( 02 Nov 2023 06:08 )             37.1                                               11-02    144  |  105  |  18  ----------------------------<  129<H>  4.1   |  27  |  0.8    Ca    9.0      02 Nov 2023 06:08  Mg     2.2     11-02    TPro  6.4  /  Alb  3.1<L>  /  TBili  0.2  /  DBili  x   /  AST  26  /  ALT  33  /  AlkPhos  96  11-02                                             Urinalysis Basic - ( 02 Nov 2023 06:08 )    Color: x / Appearance: x / SG: x / pH: x  Gluc: 129 mg/dL / Ketone: x  / Bili: x / Urobili: x   Blood: x / Protein: x / Nitrite: x   Leuk Esterase: x / RBC: x / WBC x   Sq Epi: x / Non Sq Epi: x / Bacteria: x                                                  LIVER FUNCTIONS - ( 02 Nov 2023 06:08 )  Alb: 3.1 g/dL / Pro: 6.4 g/dL / ALK PHOS: 96 U/L / ALT: 33 U/L / AST: 26 U/L / GGT: x                                                                                                MEDICATIONS  (STANDING):  chlorhexidine 2% Cloths 1 Application(s) Topical <User Schedule>  enoxaparin Injectable 55 milliGRAM(s) SubCutaneous every 12 hours  gabapentin 600 milliGRAM(s) Oral daily  levETIRAcetam 500 milliGRAM(s) Oral two times a day  midodrine 10 milliGRAM(s) Oral every 8 hours  multivitamin 1 Tablet(s) Oral daily  pantoprazole    Tablet 40 milliGRAM(s) Oral two times a day  polyethylene glycol 3350 17 Gram(s) Oral daily  raloxifene 60 milliGRAM(s) Oral daily    MEDICATIONS  (PRN):  acetaminophen     Tablet .. 650 milliGRAM(s) Oral every 6 hours PRN Temp greater or equal to 38C (100.4F), Mild Pain (1 - 3)  melatonin 5 milliGRAM(s) Oral at bedtime PRN Insomnia      X-Rays reviewed

## 2023-11-02 NOTE — PROGRESS NOTE ADULT - SUBJECTIVE AND OBJECTIVE BOX
Patient is a 58y old  Female who presents with a chief complaint of Pre-syncope (01 Nov 2023 11:10)    HPI:  Patient is a 58 year old female with a PMHx of Down syndrome, nonverbal at baseline, hypothyroidism, cerebral palsy, congenital pulmonary stenosis, Seizures, presents to the ED from nursing facility for syncope associated with tonic-clonic movement witnessed by aide. Initial vitals at nursing home showed bradycardia and hypoxia for about 10-15 minutes. No fevers, chill, cough, vomiting, diarrhea, difficulty breathing.     Hospital course 9/29/2023 -> 10/13/2023 Patient was admitted  for hemoptysis and duodenal perforation requiring intubation and ICU admission for hypovolemic and septic shock requiring temporary pressor support, which resolved. Patient also had acute hypoxemic respiratory failure likely due to multifocal pneumonia. Patient was successfully extubated and completed a course of antibiotics per ID, further infectious workup was unremarkable. Repeat CTAP with contrast showed no contrast extravasation and previous seen foci of retroperitoneal gas no longer identified. The patient was evaluated by surgery and GI, and no acute intervention recommended. Patient with stable hemoglobin. Hospital course complicated by NELA likely prerenal which resolved and NSTEMI likely type II due to shock (Echo 9/30 EF 55 to 60%, Normal left systolic function Diastolic function could not be assessed) with subsequent downtrending trops (0.34 --> 0.11). Patient's hospital course ICU --> Stepdown --> General medical floor. Patient remains hemodynamically stable on general medical, tolerating pureed feeds, and has bowel movements. Patient was discharged on 10/13/2023     (14 Oct 2023 20:33)    PAST MEDICAL & SURGICAL HISTORY:  Down syndrome  Osteoporosis  Mild anemia  Neuropathy  S/P debridement  of R hip on 3/2/21    patient seen and examined independently on morning rounds in SDU, chart reviewed and discussed with the medicine resident and on interdisciplinary rounds.    Hospital Day #19    remains in SDU ---respiratoary status improving (tolerating 4L NC today)- overnight and this morning tachycardic---bp stable- afebrile      PE:  GEN-NAD, alert and awake-eyes open-nonverbal-tremulous and appears uncomfortable  PULM- fair air entry, decreased bs bilateral bases  CVS- +s1/s2 rrr  GI- soft NT ND +bs  EXT- contracted lower extremities bilat- Right foot dressing c/d/i             Labs:                        11.3   5.72  )-----------( 399      ( 02 Nov 2023 06:08 )             37.1     CBC Full  -  ( 02 Nov 2023 06:08 )  WBC Count : 5.72 K/uL  RBC Count : 4.61 M/uL  Hemoglobin : 11.3 g/dL  Hematocrit : 37.1 %  Platelet Count - Automated : 399 K/uL  Mean Cell Volume : 80.5 fL  Mean Cell Hemoglobin : 24.5 pg  Mean Cell Hemoglobin Concentration : 30.5 g/dL  Auto Neutrophil # : 3.30 K/uL  Auto Lymphocyte # : 1.58 K/uL  Auto Monocyte # : 0.48 K/uL  Auto Eosinophil # : 0.26 K/uL  Auto Basophil # : 0.09 K/uL  Auto Neutrophil % : 57.7 %  Auto Lymphocyte % : 27.6 %  Auto Monocyte % : 8.4 %  Auto Eosinophil % : 4.5 %  Auto Basophil % : 1.6 %      11-02    144  |  105  |  18  ----------------------------<  129<H>  4.1   |  27  |  0.8    Ca    9.0      02 Nov 2023 06:08  Mg     2.2     11-02    TPro  6.4  /  Alb  3.1<L>  /  TBili  0.2  /  DBili  x   /  AST  26  /  ALT  33  /  AlkPhos  96  11-02      Microbiology:      Vital Signs Last 24 Hrs  T(C): 36.6 (02 Nov 2023 17:39), Max: 36.9 (02 Nov 2023 04:00)  T(F): 97.8 (02 Nov 2023 17:39), Max: 98.5 (02 Nov 2023 04:00)  HR: 80 (02 Nov 2023 17:39) (67 - 91)  BP: 118/61 (02 Nov 2023 17:39) (93/54 - 134/71)  BP(mean): 75 (02 Nov 2023 11:29) (71 - 83)  RR: 18 (02 Nov 2023 17:39) (18 - 20)  SpO2: 94% (02 Nov 2023 17:39) (94% - 99%)    Parameters below as of 02 Nov 2023 17:39  Patient On (Oxygen Delivery Method): nasal cannula  O2 Flow (L/min): 4      I&O's Summary    01 Nov 2023 07:01  -  02 Nov 2023 07:00  --------------------------------------------------------  IN: 210 mL / OUT: 750 mL / NET: -540 mL    02 Nov 2023 07:01  -  02 Nov 2023 18:48  --------------------------------------------------------  IN: 225 mL / OUT: 150 mL / NET: 75 mL        MEDICATIONS  (STANDING):  chlorhexidine 2% Cloths 1 Application(s) Topical <User Schedule>  enoxaparin Injectable 55 milliGRAM(s) SubCutaneous every 12 hours  gabapentin 600 milliGRAM(s) Oral daily  levETIRAcetam 500 milliGRAM(s) Oral two times a day  midodrine 10 milliGRAM(s) Oral every 8 hours  multivitamin 1 Tablet(s) Oral daily  pantoprazole    Tablet 40 milliGRAM(s) Oral two times a day  polyethylene glycol 3350 17 Gram(s) Oral daily  raloxifene 60 milliGRAM(s) Oral daily

## 2023-11-02 NOTE — PROGRESS NOTE ADULT - SUBJECTIVE AND OBJECTIVE BOX
24H events:    Patient is a 58y old Female who presents with a chief complaint of Pre-syncope (02 Nov 2023 09:21)  Primary diagnosis of Pre-syncope  Today is hospital day 19d. This morning patient was seen and examined at bedside, resting comfortably in bed.    No acute or major events overnight.    Code Status:    Family communication:  Contact date:  Name of person contacted:  Relationship to patient:  Communication details:  What matters most:    PAST MEDICAL & SURGICAL HISTORY  Down syndrome    Osteoporosis    Mild anemia    Neuropathy    S/P debridement  of R hip on 3/2/21      SOCIAL HISTORY:  Social History:  Lives at nursing home (14 Oct 2023 20:33)      ALLERGIES:  No Known Allergies    MEDICATIONS:  STANDING MEDICATIONS  chlorhexidine 2% Cloths 1 Application(s) Topical <User Schedule>  enoxaparin Injectable 55 milliGRAM(s) SubCutaneous every 12 hours  gabapentin 600 milliGRAM(s) Oral daily  lactated ringers. 1000 milliLiter(s) IV Continuous <Continuous>  levETIRAcetam 500 milliGRAM(s) Oral two times a day  midodrine 10 milliGRAM(s) Oral every 8 hours  multivitamin 1 Tablet(s) Oral daily  pantoprazole    Tablet 40 milliGRAM(s) Oral two times a day  polyethylene glycol 3350 17 Gram(s) Oral daily  raloxifene 60 milliGRAM(s) Oral daily    PRN MEDICATIONS  acetaminophen     Tablet .. 650 milliGRAM(s) Oral every 6 hours PRN  melatonin 5 milliGRAM(s) Oral at bedtime PRN    VITALS:   T(F): 98  HR: 83  BP: 105/59  RR: 20  SpO2: 96%    PHYSICAL EXAM:    GENERAL: NAD, lying in bed comfortably. on HFNC. nor verbal   HEAD:  Atraumatic, normocephalic  EYES: EOMI, PERRLA   ENT: Moist mucous membranes  NECK: Supple  HEART: Regular rate and rhythm, no murmurs  LUNGS: Unlabored respirations.  Clear to auscultation bilaterally  ABDOMEN: Soft, nontender, nondistended  EXTREMITIES: 2+ peripheral pulses bilaterally  NERVOUS SYSTEM:  awake, alert, does not follow commands    AMPAC score:    (  ) Indwelling Madsen Catheter:   Date insterted:    Reason (  ) Critical illness     (  ) urinary retention    (  ) Accurate Ins/Outs Monitoring     (  ) CMO patient    (  ) Central Line:   Date inserted:  Location: (  ) Right IJ     (  ) Left IJ     (  ) Right Fem     (  ) Left Fem    (  ) SPC        (  ) pigtail       (  ) PEG tube       (  ) colostomy       (  ) jejunostomy  (  ) U-Dall    LABS:                        11.3   5.72  )-----------( 399      ( 02 Nov 2023 06:08 )             37.1     11-02    144  |  105  |  18  ----------------------------<  129<H>  4.1   |  27  |  0.8    Ca    9.0      02 Nov 2023 06:08  Mg     2.2     11-02    TPro  6.4  /  Alb  3.1<L>  /  TBili  0.2  /  DBili  x   /  AST  26  /  ALT  33  /  AlkPhos  96  11-02      Urinalysis Basic - ( 02 Nov 2023 06:08 )    Color: x / Appearance: x / SG: x / pH: x  Gluc: 129 mg/dL / Ketone: x  / Bili: x / Urobili: x   Blood: x / Protein: x / Nitrite: x   Leuk Esterase: x / RBC: x / WBC x   Sq Epi: x / Non Sq Epi: x / Bacteria: x                RADIOLOGY:

## 2023-11-02 NOTE — PROGRESS NOTE ADULT - SUBJECTIVE AND OBJECTIVE BOX
JERICHO TEA  58y, Female  Allergy: No Known Allergies      LOS  19d    CHIEF COMPLAINT: Pre-syncope (02 Nov 2023 18:45)      INTERVAL EVENTS/HPI  - No acute events overnight  - T(F): , Max: 98.5 (11-02-23 @ 04:00)  - tachycardic yesterday - given fluids   - on NC  - WBC Count: 5.72 (11-02-23 @ 06:08)  WBC Count: 6.44 (11-01-23 @ 06:15)     - Creatinine: 0.8 (11-02-23 @ 06:08)  Creatinine: 0.8 (11-01-23 @ 06:15)       ROS  unable to obtain history secondary to patient's mental status and/or sedation      VITALS:  T(F): 97, Max: 98.5 (11-02-23 @ 04:00)  HR: 74  BP: 101/54  RR: 20Vital Signs Last 24 Hrs  T(C): 36.1 (02 Nov 2023 20:00), Max: 36.9 (02 Nov 2023 04:00)  T(F): 97 (02 Nov 2023 20:00), Max: 98.5 (02 Nov 2023 04:00)  HR: 74 (02 Nov 2023 20:00) (67 - 91)  BP: 101/54 (02 Nov 2023 20:00) (93/54 - 134/71)  BP(mean): 75 (02 Nov 2023 20:00) (71 - 83)  RR: 20 (02 Nov 2023 20:00) (18 - 20)  SpO2: 96% (02 Nov 2023 20:00) (94% - 99%)    Parameters below as of 02 Nov 2023 17:39  Patient On (Oxygen Delivery Method): nasal cannula  O2 Flow (L/min): 4      PHYSICAL EXAM:  Gen: NAD, resting in bed  HEENT: Normocephalic, atraumatic  Neck: supple, no lymphadenopathy  CV: Regular rate & regular rhythm  Lungs: decreased BS at bases, no fremitus  Abdomen: Soft, BS present  Ext: Warm, well perfused  Neuro: non focal, awake  Skin: right foot plantar ulcer - probes deep to bone  Lines: no phlebitis    FH: Non-contributory  Social Hx: Non-contributory    TESTS & MEASUREMENTS:                        11.3   5.72  )-----------( 399      ( 02 Nov 2023 06:08 )             37.1     11-02    144  |  105  |  18  ----------------------------<  129<H>  4.1   |  27  |  0.8    Ca    9.0      02 Nov 2023 06:08  Mg     2.2     11-02    TPro  6.4  /  Alb  3.1<L>  /  TBili  0.2  /  DBili  x   /  AST  26  /  ALT  33  /  AlkPhos  96  11-02      LIVER FUNCTIONS - ( 02 Nov 2023 06:08 )  Alb: 3.1 g/dL / Pro: 6.4 g/dL / ALK PHOS: 96 U/L / ALT: 33 U/L / AST: 26 U/L / GGT: x           Urinalysis Basic - ( 02 Nov 2023 06:08 )    Color: x / Appearance: x / SG: x / pH: x  Gluc: 129 mg/dL / Ketone: x  / Bili: x / Urobili: x   Blood: x / Protein: x / Nitrite: x   Leuk Esterase: x / RBC: x / WBC x   Sq Epi: x / Non Sq Epi: x / Bacteria: x        Culture - Urine (collected 10-17-23 @ 19:37)  Source: Clean Catch Clean Catch (Midstream)  Final Report (10-19-23 @ 00:23):    No growth    Culture - Sputum (collected 10-17-23 @ 19:37)  Source: Trach Asp Tracheal Aspirate  Gram Stain (10-18-23 @ 02:39):    Numerous polymorphonuclear leukocytes per low power field    No Squamous epithelial cells per low power field    Moderate Yeast like cells seen per oil power field  Final Report (10-19-23 @ 21:17):    Normal Respiratory Mili present    Culture - Blood (collected 10-17-23 @ 12:35)  Source: .Blood Blood  Final Report (10-22-23 @ 22:00):    No growth at 5 days    Culture - Sputum (collected 10-04-23 @ 12:00)  Source: Trach Asp Tracheal Aspirate  Gram Stain (10-04-23 @ 22:58):    Few polymorphonuclear leukocytes per low power field    Rare Squamous epithelial cells per low power field    No organisms seen per oil power field  Final Report (10-06-23 @ 10:31):    No growth            INFECTIOUS DISEASES TESTING  Procalcitonin, Serum: 0.26 (10-24-23 @ 11:00)  MRSA PCR Result.: Negative (10-17-23 @ 17:20)  Fungitell: >500 (10-17-23 @ 17:20)  Procalcitonin, Serum: 4.36 (10-17-23 @ 17:20)  Procalcitonin, Serum: 4.18 (10-17-23 @ 12:35)  Procalcitonin, Serum: 0.07 (10-15-23 @ 07:28)  COVID-19 PCR: NotDetec (10-12-23 @ 09:14)  Procalcitonin, Serum: 0.40 (10-07-23 @ 10:54)  Legionella Antigen, Urine: Negative (09-30-23 @ 14:36)  MRSA PCR Result.: Negative (09-29-23 @ 16:50)  Procalcitonin, Serum: 9.78 (08-12-23 @ 11:25)  MRSA PCR Result.: Negative (08-12-23 @ 06:10)  Rapid RVP Result: NotDetec (08-12-23 @ 01:33)  Procalcitonin, Serum: 0.63 (08-10-23 @ 08:46)  Procalcitonin, Serum: 7.82 (08-04-23 @ 16:13)  MRSA PCR Result.: Negative (08-04-23 @ 14:52)  Rapid RVP Result: NotDetec (08-04-23 @ 03:00)      INFLAMMATORY MARKERS  Sedimentation Rate, Erythrocyte: 67 mm/Hr (10-15-23 @ 07:28)  C-Reactive Protein, Serum: 16.1 mg/L (10-15-23 @ 07:28)      RADIOLOGY & ADDITIONAL TESTS:  I have personally reviewed the last available Chest xray  CXR      CT      CARDIOLOGY TESTING  12 Lead ECG:   Ventricular Rate 113 BPM    Atrial Rate 113 BPM    P-R Interval 118 ms    QRS Duration 62 ms    Q-T Interval 330 ms    QTC Calculation(Bazett) 452 ms    P Axis 55 degrees    R Axis 95 degrees    T Axis 33 degrees    Diagnosis Line Sinus tachycardiawith Premature supraventricular complexes and with  occasional Premature ventricular complexes with junctional escape complexes  Rightward axis  Nonspecific ST abnormality  Abnormal ECG    Confirmed by MARAJN MENDES MD ([id number]) on 11/2/2023 2:16:59 PM  (11-02-23 @ 10:42)  12 Lead ECG:   Ventricular Rate 123 BPM    Atrial Rate 123 BPM    P-R Interval 124 ms    QRS Duration 74 ms    Q-T Interval 310 ms    QTC Calculation(Bazett) 443 ms    P Axis 33 degrees    R Axis 27 degrees    T Axis 15 degrees    Diagnosis Line Sinus tachycardia  Nonspecific ST abnormality  Abnormal ECG    Confirmed by caleb roberts ([id number]) on 10/25/2023 2:09:24 PM (10-22-23 @ 18:46)      MEDICATIONS  acetaminophen     Tablet .. 650 Oral every 12 hours  chlorhexidine 2% Cloths 1 Topical <User Schedule>  enoxaparin Injectable 55 SubCutaneous every 12 hours  gabapentin 600 Oral daily  lactated ringers. 1000 IV Continuous <Continuous>  levETIRAcetam 500 Oral two times a day  midodrine 10 Oral every 8 hours  multivitamin 1 Oral daily  pantoprazole    Tablet 40 Oral two times a day  polyethylene glycol 3350 17 Oral daily  raloxifene 60 Oral daily      WEIGHT  Weight (kg): 56.7 (11-02-23 @ 04:00)  Creatinine: 0.8 mg/dL (11-02-23 @ 06:08)      ANTIBIOTICS:      All available historical records have been reviewed

## 2023-11-02 NOTE — PROGRESS NOTE ADULT - ASSESSMENT
a/p:  58 year old female with a PMHx of Down syndrome, nonverbal at baseline, hypothyroidism, cerebral palsy, congenital pulmonary stenosis, Seizures, presents to the ED from nursing facility for syncope associated with tonic-clonic movement witnessed by aide. Initial vitals at nursing home showed bradycardia and hypoxia for about 10-15 minutes. No fevers, chill, cough, vomiting, diarrhea, difficulty breathing.    Pt initialy was admitted to medical floor then on 10/17 was upgraded to ICU for respiratory distress and hypotension and now downgraded to CEU.    #Acute hypoxemic respiratory failure-2/2 Aspiration pneumonia  #DVT-nonocclusive of L common femoral vein  -continue management in the SDU  -pulmonary/critical care following  -cont o2 suppl---off HFNC and now tolerating 4L NC (goal o2 sat >90%) - resapiratory status improving---now need to address podiatry and R foot OM  -daily cxr  -aggressive pulm suctioning and chest PT--aspiration precautions including hob elevation (feed with assistance)  -therapeutic lovenox 55 mg sq bid  -tachycardic today (new)---could have been 2/2 pain but need to monitor closely---repeat EKG and f/u   -completed 7 day caspo (for prior +fungitell)  -awaiting podiatry eval for further intervention for Right foot/metatarsal OM --currently holding abx to obtain better yield culture/bone biopsy however if becomes febrile would repeat blood cx and urine cx and start abx     #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 and mutliple L foot deep tissue injuries  -podiatry follow up for surgical intervention--would benefit from obtaining deep tissue/bone biopsy/culture while off abx (both MRI and XRAY with findings c/w OM)  -pain control--patient is not able to ask for prn medications---will give tylenol standing q12 hr today 2/2 tachycardia and suspected pain   -daily wound care change   -ID following---will repeat procal, fungitell  -start IVF (LF @75cc/hr)  -frequent turning/off loading and position change as per protocol with local wound/skin care      #Chronic barlow for urinary retention  -monitor urine output     #Seizure- abnormal VEEG  -monitor neurochecks  -neurology following  -continue with keppra and f/u level    DVT/GI ppx  guarded prognosis    FULL CODE    Total time spent to complete patient's bedside assessment, review medical chart, discuss medical plan of care with covering medical team was more than 50 minutes  with >50% of time spendt face to face with patient, discussion with patient/family and/or coordination of care     continue monitoring in SDU

## 2023-11-02 NOTE — PROGRESS NOTE ADULT - ASSESSMENT
Patient is a 58 year old female with a PMHx of Down syndrome, nonverbal at baseline, hypothyroidism, cerebral palsy, congenital pulmonary stenosis, Seizures, presents to the ED from nursing facility for syncope associated with tonic-clonic movement witnessed by aide. Initial vitals at nursing home showed bradycardia and hypoxia for about 10-15 minutes. No fevers, chill, cough, vomiting, diarrhea, difficulty breathing.     Hospital course 9/29/2023 -> 10/13/2023 Patient was admitted  for hemoptysis and duodenal perforation requiring intubation and ICU admission for hypovolemic and septic shock requiring temporary pressor support, which resolved. Patient also had acute hypoxemic respiratory failure likely due to multifocal pneumonia. Patient was successfully extubated and completed a course of antibiotics per ID, further infectious workup was unremarkable. Repeat CTAP with contrast showed no contrast extravasation and previous seen foci of retroperitoneal gas no longer identified. The patient was evaluated by surgery and GI, and no acute intervention recommended. Patient with stable hemoglobin. Hospital course complicated by NELA likely prerenal which resolved and NSTEMI likely type II due to shock (Echo 9/30 EF 55 to 60%, Normal left systolic function Diastolic function could not be assessed) with subsequent downtrending trops (0.34 --> 0.11). Patient's hospital course ICU --> Stepdown --> General medical floor. Patient remains hemodynamically stable on general medical, tolerating pureed feeds, and has bowel movements. Patient was discharged on 10/13/2023     Arrive to ED 10/14/2023 from nursing facility for syncope associated with tonic-clonic movement witnessed by aide. Initial vitals at nursing home showed bradycardia and hypoxia for about 10-15 minutes. No fevers, chill, cough, vomiting, diarrhea, difficulty breathing.      10/17/23: Patient had possible aspiration episode this morning around 6am (per night resident), and was desaturating to low 80s, Cxr showed worsening lower lobe pna > transitioned to nonrebreather > maxed out on nonrebreather satting at 91%, BP 88/52, using accessory muscles > transitioned to high flow > no improvement in SpO2, BP 80s/50s > RR called > anesthesia intubated > peripheral IV access acquired and started levophed, and precedex > BP 90s/50s > gave propofol during central line placement > levophed transitioned to central line. Patient is stable and will be transferred to ICU.    MICU course:  Patient arrived to ICU intubated and sedated. Patient on levophed, then weaned off pressors and started on midodrine. Patient initially on precedex, then fentanyl, then weaned off sedation. Serial CXR showing grossly stable b/l lung opacities. Patient successfully extubated on 10/21, transitioned to nasal cannula. Patient desatting on NC, transitioned to HFNC, satting well.   Patient found to have DVT in left common fem and was started on therapeutic Lovenox. Hemoglobin and BP have been stable while on full AC (patient has recent h/o duodenal perforation). CT angio done 10/18 negative for PE.   ID is following, patient on meropenem for PNA. Fungitell elevated, patient started on caspofungin. Meropenem discontinued on 10/24.   On 10/17 patient noted to have episode of whole body rhythmic shaking, left arm jerking, abnormal eye movements. 2mg ativan given. vEEG showed generalized slowing, diffusely expressed triphasic waves, right posterior quadrant sharp waves that are probably epileptiform and sharp transients, small number of stimulus-dependent posterior periodic dischargees. Per neuro patient started on Keppra 500mg bid.   Patient has osteomyelitis of right foot. Xray right foot showed post transmetatarsal amputation of the first ray with periosteal reaction at the amputation stump, suspicious for recurrent osteomyelitis. There is second toe ulcer with osseous erosion/destruction at the second distal phalangeal tuft, consistent with osteomyelitis. MR right foot - 1.  Limited exam. 2.  Osteomyelitis of the first metatarsal stump. 3.  Osteomyelitis of the second toe distal phalanx. Podiatry is following, was initially scheduled for OR debridement on 10/20, which was cancelled d/t patient condition. Podiatry will reassess this week and reschedule surgery.   Speech and swallow evaluated patient - initially patient not appropriate for po trials, continue with NPO with tube feeds. Following speech and swallow re-eval, patient started on pureed with mildly thick liquids.     #Acute hypoxemic respiratory failure s/p intubation  #Aspiration pneumonia   #DVT left common fem on full AC   -Pt being treated for multilobar pna this admission. Possible aspiration event on 10/17 AM, RR called for desats, no improvement on nonrebreather>HFNC>anesthesia intubated patient on floor. R IJ placed, patient started on levo and precedex.   -Pt now off pressors   -midodrine 10 q8  -CXR - Stable bilateral lung opacities  -Duplex 10/17 shows DVT in Left common fem - pt on Lovenox 50mg bid  -CT angio 10/18 - No CT evidence of pulmonary embolism. Bilateral pneumonia.  -Hb stable  -bowel regimen, pt having BM   -Fungitell positive >500  -ID following, on caspofungin. Dc meropenem (received for 7 days).  -Patient on HFNC..  -wean O2  -NIV during sleep   -dc hydrocortisone   -f/u S+S :       #H/o recent duodenal perforation  -pt on GI ppx  -Pt on therapeutic lovenox for DVT  -Hb stable     #Osteomyelitis, right foot   -Patient with multiple decubitus ulcers on her right foot, less on left foot.   -Xray right foot showed post transmetatarsal amputation of the first ray with periosteal reaction at the amputation stump, suspicious for recurrent osteomyelitis. There is second toe ulcer with osseous erosion/destruction at the second distal phalangeal tuft, consistent with osteomyelitis. There is dorsal   -MR right foot - 1.  Limited exam. 2.  Osteomyelitis of the first metatarsal stump. 3.  Osteomyelitis of the second toe distal phalanx.  -podiatry following, OR for debridement - was scheduled for Friday 10/20 - cancelled and will reassess. Surgical debridement indicated; Will plan for OR once patient has stabilized and off oxygen.   - As per ID, no more antibiotics     #H/o seizures, not on AED  -On day of admission pt had tonic-clonic movements witnessed by aide  -neuro consulted - Episode suspicious for cardiac event leading to drop in BP and HR. Unclear if epileptic seizure.  -EEG showed generalized slowing, no seizure activity   -OVN 10/17 RN noted another episode, lasted around 30sec with whole body rhythmic shaking, left arm jerking, abnormal eye movements. 2mg ativan given.   -vEEG showed generalized slowing, diffusely expressed triphasic waves, right posterior quadrant sharp waves that are probably epileptiform and sharp transients, small number of stimulus-dependent posterior periodic dischargees.   -Per neuro patient started on Keppra 500mg bid.     #Tachycardia  - noted on cardiac monitor  - afebrile  - ekg: sinus tachycardia   - Tylenol given. Tachycardia improved   - started on iv fluids  - repeat fungitel and procalcitonin     Misc:  Diet -  Pureed diet   Activity - bedrest  DVT ppx - therapeutic Lovenox   Dispo - sdu  code- full

## 2023-11-03 LAB
ALBUMIN SERPL ELPH-MCNC: 3.1 G/DL — LOW (ref 3.5–5.2)
ALBUMIN SERPL ELPH-MCNC: 3.1 G/DL — LOW (ref 3.5–5.2)
ALP SERPL-CCNC: 90 U/L — SIGNIFICANT CHANGE UP (ref 30–115)
ALP SERPL-CCNC: 90 U/L — SIGNIFICANT CHANGE UP (ref 30–115)
ALT FLD-CCNC: 24 U/L — SIGNIFICANT CHANGE UP (ref 0–41)
ALT FLD-CCNC: 24 U/L — SIGNIFICANT CHANGE UP (ref 0–41)
ANION GAP SERPL CALC-SCNC: 10 MMOL/L — SIGNIFICANT CHANGE UP (ref 7–14)
ANION GAP SERPL CALC-SCNC: 10 MMOL/L — SIGNIFICANT CHANGE UP (ref 7–14)
AST SERPL-CCNC: 21 U/L — SIGNIFICANT CHANGE UP (ref 0–41)
AST SERPL-CCNC: 21 U/L — SIGNIFICANT CHANGE UP (ref 0–41)
BASOPHILS # BLD AUTO: 0.07 K/UL — SIGNIFICANT CHANGE UP (ref 0–0.2)
BASOPHILS # BLD AUTO: 0.07 K/UL — SIGNIFICANT CHANGE UP (ref 0–0.2)
BASOPHILS NFR BLD AUTO: 1.1 % — HIGH (ref 0–1)
BASOPHILS NFR BLD AUTO: 1.1 % — HIGH (ref 0–1)
BILIRUB SERPL-MCNC: <0.2 MG/DL — SIGNIFICANT CHANGE UP (ref 0.2–1.2)
BILIRUB SERPL-MCNC: <0.2 MG/DL — SIGNIFICANT CHANGE UP (ref 0.2–1.2)
BUN SERPL-MCNC: 16 MG/DL — SIGNIFICANT CHANGE UP (ref 10–20)
BUN SERPL-MCNC: 16 MG/DL — SIGNIFICANT CHANGE UP (ref 10–20)
CALCIUM SERPL-MCNC: 8.7 MG/DL — SIGNIFICANT CHANGE UP (ref 8.4–10.5)
CALCIUM SERPL-MCNC: 8.7 MG/DL — SIGNIFICANT CHANGE UP (ref 8.4–10.5)
CHLORIDE SERPL-SCNC: 106 MMOL/L — SIGNIFICANT CHANGE UP (ref 98–110)
CHLORIDE SERPL-SCNC: 106 MMOL/L — SIGNIFICANT CHANGE UP (ref 98–110)
CO2 SERPL-SCNC: 27 MMOL/L — SIGNIFICANT CHANGE UP (ref 17–32)
CO2 SERPL-SCNC: 27 MMOL/L — SIGNIFICANT CHANGE UP (ref 17–32)
CREAT SERPL-MCNC: 0.8 MG/DL — SIGNIFICANT CHANGE UP (ref 0.7–1.5)
CREAT SERPL-MCNC: 0.8 MG/DL — SIGNIFICANT CHANGE UP (ref 0.7–1.5)
EGFR: 86 ML/MIN/1.73M2 — SIGNIFICANT CHANGE UP
EGFR: 86 ML/MIN/1.73M2 — SIGNIFICANT CHANGE UP
EOSINOPHIL # BLD AUTO: 0.31 K/UL — SIGNIFICANT CHANGE UP (ref 0–0.7)
EOSINOPHIL # BLD AUTO: 0.31 K/UL — SIGNIFICANT CHANGE UP (ref 0–0.7)
EOSINOPHIL NFR BLD AUTO: 4.7 % — SIGNIFICANT CHANGE UP (ref 0–8)
EOSINOPHIL NFR BLD AUTO: 4.7 % — SIGNIFICANT CHANGE UP (ref 0–8)
GLUCOSE SERPL-MCNC: 92 MG/DL — SIGNIFICANT CHANGE UP (ref 70–99)
GLUCOSE SERPL-MCNC: 92 MG/DL — SIGNIFICANT CHANGE UP (ref 70–99)
HCT VFR BLD CALC: 37.7 % — SIGNIFICANT CHANGE UP (ref 37–47)
HCT VFR BLD CALC: 37.7 % — SIGNIFICANT CHANGE UP (ref 37–47)
HGB BLD-MCNC: 11.4 G/DL — LOW (ref 12–16)
HGB BLD-MCNC: 11.4 G/DL — LOW (ref 12–16)
IMM GRANULOCYTES NFR BLD AUTO: 0.3 % — SIGNIFICANT CHANGE UP (ref 0.1–0.3)
IMM GRANULOCYTES NFR BLD AUTO: 0.3 % — SIGNIFICANT CHANGE UP (ref 0.1–0.3)
LYMPHOCYTES # BLD AUTO: 1.84 K/UL — SIGNIFICANT CHANGE UP (ref 1.2–3.4)
LYMPHOCYTES # BLD AUTO: 1.84 K/UL — SIGNIFICANT CHANGE UP (ref 1.2–3.4)
LYMPHOCYTES # BLD AUTO: 27.8 % — SIGNIFICANT CHANGE UP (ref 20.5–51.1)
LYMPHOCYTES # BLD AUTO: 27.8 % — SIGNIFICANT CHANGE UP (ref 20.5–51.1)
MAGNESIUM SERPL-MCNC: 2.2 MG/DL — SIGNIFICANT CHANGE UP (ref 1.8–2.4)
MAGNESIUM SERPL-MCNC: 2.2 MG/DL — SIGNIFICANT CHANGE UP (ref 1.8–2.4)
MCHC RBC-ENTMCNC: 24.4 PG — LOW (ref 27–31)
MCHC RBC-ENTMCNC: 24.4 PG — LOW (ref 27–31)
MCHC RBC-ENTMCNC: 30.2 G/DL — LOW (ref 32–37)
MCHC RBC-ENTMCNC: 30.2 G/DL — LOW (ref 32–37)
MCV RBC AUTO: 80.7 FL — LOW (ref 81–99)
MCV RBC AUTO: 80.7 FL — LOW (ref 81–99)
MONOCYTES # BLD AUTO: 0.83 K/UL — HIGH (ref 0.1–0.6)
MONOCYTES # BLD AUTO: 0.83 K/UL — HIGH (ref 0.1–0.6)
MONOCYTES NFR BLD AUTO: 12.5 % — HIGH (ref 1.7–9.3)
MONOCYTES NFR BLD AUTO: 12.5 % — HIGH (ref 1.7–9.3)
NEUTROPHILS # BLD AUTO: 3.56 K/UL — SIGNIFICANT CHANGE UP (ref 1.4–6.5)
NEUTROPHILS # BLD AUTO: 3.56 K/UL — SIGNIFICANT CHANGE UP (ref 1.4–6.5)
NEUTROPHILS NFR BLD AUTO: 53.6 % — SIGNIFICANT CHANGE UP (ref 42.2–75.2)
NEUTROPHILS NFR BLD AUTO: 53.6 % — SIGNIFICANT CHANGE UP (ref 42.2–75.2)
NRBC # BLD: 0 /100 WBCS — SIGNIFICANT CHANGE UP (ref 0–0)
NRBC # BLD: 0 /100 WBCS — SIGNIFICANT CHANGE UP (ref 0–0)
PLATELET # BLD AUTO: 339 K/UL — SIGNIFICANT CHANGE UP (ref 130–400)
PLATELET # BLD AUTO: 339 K/UL — SIGNIFICANT CHANGE UP (ref 130–400)
PMV BLD: 10 FL — SIGNIFICANT CHANGE UP (ref 7.4–10.4)
PMV BLD: 10 FL — SIGNIFICANT CHANGE UP (ref 7.4–10.4)
POTASSIUM SERPL-MCNC: 4.3 MMOL/L — SIGNIFICANT CHANGE UP (ref 3.5–5)
POTASSIUM SERPL-MCNC: 4.3 MMOL/L — SIGNIFICANT CHANGE UP (ref 3.5–5)
POTASSIUM SERPL-SCNC: 4.3 MMOL/L — SIGNIFICANT CHANGE UP (ref 3.5–5)
POTASSIUM SERPL-SCNC: 4.3 MMOL/L — SIGNIFICANT CHANGE UP (ref 3.5–5)
PROCALCITONIN SERPL-MCNC: 0.04 NG/ML — SIGNIFICANT CHANGE UP (ref 0.02–0.1)
PROCALCITONIN SERPL-MCNC: 0.04 NG/ML — SIGNIFICANT CHANGE UP (ref 0.02–0.1)
PROT SERPL-MCNC: 6 G/DL — SIGNIFICANT CHANGE UP (ref 6–8)
PROT SERPL-MCNC: 6 G/DL — SIGNIFICANT CHANGE UP (ref 6–8)
RBC # BLD: 4.67 M/UL — SIGNIFICANT CHANGE UP (ref 4.2–5.4)
RBC # BLD: 4.67 M/UL — SIGNIFICANT CHANGE UP (ref 4.2–5.4)
RBC # FLD: SIGNIFICANT CHANGE UP % (ref 11.5–14.5)
RBC # FLD: SIGNIFICANT CHANGE UP % (ref 11.5–14.5)
SODIUM SERPL-SCNC: 143 MMOL/L — SIGNIFICANT CHANGE UP (ref 135–146)
SODIUM SERPL-SCNC: 143 MMOL/L — SIGNIFICANT CHANGE UP (ref 135–146)
WBC # BLD: 6.63 K/UL — SIGNIFICANT CHANGE UP (ref 4.8–10.8)
WBC # BLD: 6.63 K/UL — SIGNIFICANT CHANGE UP (ref 4.8–10.8)
WBC # FLD AUTO: 6.63 K/UL — SIGNIFICANT CHANGE UP (ref 4.8–10.8)
WBC # FLD AUTO: 6.63 K/UL — SIGNIFICANT CHANGE UP (ref 4.8–10.8)

## 2023-11-03 PROCEDURE — 99233 SBSQ HOSP IP/OBS HIGH 50: CPT

## 2023-11-03 RX ORDER — SODIUM CHLORIDE 9 MG/ML
500 INJECTION, SOLUTION INTRAVENOUS ONCE
Refills: 0 | Status: DISCONTINUED | OUTPATIENT
Start: 2023-11-03 | End: 2023-11-06

## 2023-11-03 RX ADMIN — ENOXAPARIN SODIUM 55 MILLIGRAM(S): 100 INJECTION SUBCUTANEOUS at 05:56

## 2023-11-03 RX ADMIN — MIDODRINE HYDROCHLORIDE 10 MILLIGRAM(S): 2.5 TABLET ORAL at 13:15

## 2023-11-03 RX ADMIN — MIDODRINE HYDROCHLORIDE 10 MILLIGRAM(S): 2.5 TABLET ORAL at 21:40

## 2023-11-03 RX ADMIN — GABAPENTIN 600 MILLIGRAM(S): 400 CAPSULE ORAL at 11:24

## 2023-11-03 RX ADMIN — LEVETIRACETAM 500 MILLIGRAM(S): 250 TABLET, FILM COATED ORAL at 05:51

## 2023-11-03 RX ADMIN — CHLORHEXIDINE GLUCONATE 1 APPLICATION(S): 213 SOLUTION TOPICAL at 05:52

## 2023-11-03 RX ADMIN — LEVETIRACETAM 500 MILLIGRAM(S): 250 TABLET, FILM COATED ORAL at 17:44

## 2023-11-03 RX ADMIN — MIDODRINE HYDROCHLORIDE 10 MILLIGRAM(S): 2.5 TABLET ORAL at 05:51

## 2023-11-03 RX ADMIN — PANTOPRAZOLE SODIUM 40 MILLIGRAM(S): 20 TABLET, DELAYED RELEASE ORAL at 05:58

## 2023-11-03 RX ADMIN — RALOXIFENE HYDROCHLORIDE 60 MILLIGRAM(S): 60 TABLET, COATED ORAL at 11:24

## 2023-11-03 RX ADMIN — Medication 650 MILLIGRAM(S): at 05:55

## 2023-11-03 RX ADMIN — ENOXAPARIN SODIUM 55 MILLIGRAM(S): 100 INJECTION SUBCUTANEOUS at 17:44

## 2023-11-03 RX ADMIN — Medication 1 TABLET(S): at 11:24

## 2023-11-03 NOTE — SWALLOW BEDSIDE ASSESSMENT ADULT - SWALLOW EVAL: RECOMMENDED FEEDING/EATING TECHNIQUES
oral hygiene/position upright (90 degrees)/small sips/bites

## 2023-11-03 NOTE — SWALLOW BEDSIDE ASSESSMENT ADULT - SWALLOW EVAL: CURRENT DIET
puree, mildly thick liquids
NPO pending speech/swallow
puree, mildly thick liquids
NPO
puree, mildly thick liquids

## 2023-11-03 NOTE — SWALLOW BEDSIDE ASSESSMENT ADULT - NS SPL SWALLOW CLINIC TRIAL FT
+spontaneous noises/vocalizations t/o PO trials, appear to be c/w pt's behavior.
deficits in secretion management, suspect pharyngeal dysphagia

## 2023-11-03 NOTE — SWALLOW BEDSIDE ASSESSMENT ADULT - COMMENTS
Pt is known to SLP services from earlier this month s/p extubation in ICU. FEES completed while on HFNC, revealing "Moderate oral dysphagia with mild pharyngeal dysphagia for mildly thick, moderately thick, and pureed solids." Recs for Puree/mildly thick, 1:1 feeds. Pt was later upgraded at the bedside while on RA to puree/thins prior to d/c back to group home.
Hypoxic and bradycardic after seizure episode. Now on NRB.    Pt is known to SLP services from earlier this month s/p extubation in ICU. FEES completed while on HFNC, revealing "Moderate oral dysphagia with mild pharyngeal dysphagia for mildly thick, moderately thick, and pureed solids." Recs for Puree/mildly thick, 1:1 feeds. Pt was later upgraded at the bedside while on RA to puree/thins prior to d/c back to group home.
Pt is known to SLP services from earlier this month s/p extubation in ICU. FEES completed while on HFNC, revealing "Moderate oral dysphagia with mild pharyngeal dysphagia for mildly thick, moderately thick, and pureed solids." Recs for Puree/mildly thick, 1:1 feeds. Pt was later upgraded at the bedside while on RA to puree/thins prior to d/c back to group home.
Pt is known to SLP services from earlier this month s/p extubation in ICU. FEES completed while on HFNC, revealing "Moderate oral dysphagia with mild pharyngeal dysphagia for mildly thick, moderately thick, and pureed solids." Recs for Puree/mildly thick, 1:1 feeds. Pt was later upgraded at the bedside while on RA to puree/thins prior to d/c back to group home.
S/P most recent instrumental swallow study yesterday 10/24, recs for puree, mildly thick liquids.
Repeat CXR 11/2-> slightly decreased left basal opacity.
pt downgraded to SDU.

## 2023-11-03 NOTE — SWALLOW BEDSIDE ASSESSMENT ADULT - DATE
27-Oct-2023
17-Oct-2023
21-Oct-2023
01-Nov-2023
02-Nov-2023
03-Nov-2023
23-Oct-2023
22-Oct-2023
25-Oct-2023
26-Oct-2023
30-Oct-2023

## 2023-11-03 NOTE — PROGRESS NOTE ADULT - ASSESSMENT
ASSESSMENT   58 year old female with a PMHx of Down syndrome, nonverbal at baseline, hypothyroidism, cerebral palsy, congenital pulmonary stenosis, Seizures, presents to the ED from nursing facility for syncope associated with tonic-clonic movement witnessed by aide. Initial vitals at nursing home showed bradycardia and hypoxia for about 10-15 minutes. N    IMPRESSION  #Seizure like activity   #Right planter foot ulcers - two full thickness ulcers - serous drainage with mild erythema with OM  - MR Foot No Cont, Right (10.16.23 @ 21:51): IMPRESSION: 1.  Limited exam. 2.  Osteomyelitis of the first metatarsal stump. 3.  Osteomyelitis of the second toe distal phalanx.    #Rapid Response 10/17   #HAP   - Xray Chest 1 View- PORTABLE-Urgent (Xray Chest 1 View- PORTABLE-Urgent .) (10.17.23 @ 06:10): Worsening pneumonia, left lower lung.  - trach Cx 10/17 NG   - Procalcitonin, Serum: 4.36(10.17.23 @ 17:20)      #Elevated Fungitell   Fungitell: >500 pg/mL (10.17.23 @ 17:20)    #Buttock decubitus ulcer     #Recently Treated Multifocal PNA    #Down syndrome/Crebral Palsy   #Obesity BMI (kg/m2): 23.7, 26.3  #Abx allergy: No Known Allergies    RECOMMENDATIONS  - planned for OR eventually   - if OR date is not possible in near future, deep wound cx would be helpful in guiding antibiotic choice  - for now, would monitor off antibiotics   - wean O2 as tolerated    I will be away from 11/6/2023 - 11/10/2023   Please call Dr. Dela Cruz or on-call ID  if with further questions during those days.   Spectra [phone]    Please call or message on [employer] Teams if with any questions.  [id number]

## 2023-11-03 NOTE — SWALLOW BEDSIDE ASSESSMENT ADULT - SPECIFY REASON(S)
dysphagia f/u
dysphagia f/u s/p FEES
dysphagia evaluation
dysphagia f/u
swallow f/u
dysphagia f/u

## 2023-11-03 NOTE — SWALLOW BEDSIDE ASSESSMENT ADULT - SWALLOW EVAL: DIAGNOSIS
+toleration for puree, mildly thick liquids w/o overt s/s aspiration/penetration; mild oral dysphagia
+toleration for puree, mildly thick liquids w/o overt s/s aspiration/penetration; mild oral dysphagia
not appropriate for PO diet at this time
unable to assess swallow function at this time as pt is on NRB. Known to SLP dept with FEES earlier this month while on HFNC. On RA pt was upgraded to puree/thins prior to d/c.
not appropriate for PO diet at this time
+toleration for puree, thin liquids w/o overt s/s aspiration/penetration; mild oral dysphagia; pt w/ spontaneous noises/vocalizations, staff report this is part of pt's baseline behavior
+toleration for puree, mildly thick liquids w/o overt s/s aspiration/penetration; mild oral dysphagia
+toleration for puree, mildly thick liquids, and thin liquids w/o overt s/s aspiration/penetration; mild oral dysphagia; pt w/ spontaneous noises/vocalizations, staff report this is part of pt's baseline behavior
+toleration for puree, mildly thick liquids w/o overt s/s aspiration/penetration; mild oral dysphagia
pt warrants FEES when she is consistently maintaining arousal
+toleration for puree, mildly thick liquids w/o overt s/s aspiration/penetration; mild oral dysphagia; suspected pharyngeal dysphagia for thin liquids

## 2023-11-03 NOTE — PROGRESS NOTE ADULT - ASSESSMENT
a/p:  58 year old female with a PMHx of Down syndrome, nonverbal at baseline, hypothyroidism, cerebral palsy, congenital pulmonary stenosis, Seizures, presents to the ED from nursing facility for syncope associated with tonic-clonic movement witnessed by aide. Initial vitals at nursing home showed bradycardia and hypoxia for about 10-15 minutes. No fevers, chill, cough, vomiting, diarrhea, difficulty breathing.    Pt initialy was admitted to medical floor then on 10/17 was upgraded to ICU for respiratory distress and hypotension and now downgraded to CEU.    #Acute hypoxemic respiratory failure-2/2 Aspiration pneumonia  #DVT-nonocclusive of L common femoral vein  -respiratory status improving and remains stable on 4L o2 via nc  -plan for downgrade to medical floor today  -pulmonary/critical care following  -cont o2 suppl--tolerating 4L NC (goal o2 sat >90%)   -daily cxr  -aggressive pulm suctioning and chest PT--aspiration precautions including hob elevation (feed with assistance)  -therapeutic lovenox 55 mg sq bid  -completed 7 day caspo (for prior +fungitell)  -as respiratory status improving plan for podiatry f/u for further intervention for Right foot/metatarsal OM --currently holding abx to obtain better yield culture/bone biopsy however if becomes febrile would repeat blood cx and urine cx and start abx     #Right foot (1st and 2nd distal phalanx) OM and mutliple L foot deep tissue injuries (seen on xray and MRI)  -podiatry follow up for surgical intervention--would benefit from obtaining deep tissue/bone biopsy/culture while off abx (both MRI and XRAY with findings c/w OM)  -pain control--patient is not able to ask for prn medications---will continue tylenol standing q12 hr   -daily wound care/dressing change   -ID following---f/u repeat procal, fungitell  -continue IVF (LF @75cc/hr)  -frequent turning/off loading and position change as per protocol with local wound/skin care      #Chronic barlow for urinary retention  -monitor urine output     #Seizure- abnormal VEEG  -monitor neurochecks  -neurology following  -continue with keppra and f/u level    DVT/GI ppx  guarded prognosis    FULL CODE    Total time spent to complete patient's bedside assessment, review medical chart, discuss medical plan of care with covering medical team was more than 50 minutes  with >50% of time spendt face to face with patient, discussion with patient/family and/or coordination of care     downgrade to regular medical floor today---f/u with podiatry plan for Right foot/metatarsal OM and need for bx/culture

## 2023-11-03 NOTE — PROGRESS NOTE ADULT - SUBJECTIVE AND OBJECTIVE BOX
Patient is a 58y old  Female who presents with a chief complaint of Pre-syncope (02 Nov 2023 20:39)        Over Night Events:  remains on NC.  off pressors.          ROS:     All ROS are negative except HPI         PHYSICAL EXAM    ICU Vital Signs Last 24 Hrs  T(C): 36.2 (03 Nov 2023 04:45), Max: 36.7 (02 Nov 2023 07:54)  T(F): 97.2 (03 Nov 2023 04:45), Max: 98.1 (02 Nov 2023 07:54)  HR: 81 (03 Nov 2023 04:45) (73 - 83)  BP: 105/73 (03 Nov 2023 04:45) (101/54 - 120/58)  BP(mean): 55 (03 Nov 2023 04:45) (55 - 83)  ABP: --  ABP(mean): --  RR: 18 (03 Nov 2023 04:45) (18 - 20)  SpO2: 99% (03 Nov 2023 04:45) (94% - 99%)    O2 Parameters below as of 02 Nov 2023 17:39  Patient On (Oxygen Delivery Method): nasal cannula  O2 Flow (L/min): 4          CONSTITUTIONAL:  NAD    ENT:   Airway patent,   Mouth with normal mucosa.       EYES:   Pupils equal,   Round and reactive to light.    CARDIAC:   Normal rate,   Regular rhythm.    No edema    RESPIRATORY:   No wheezing  Bilateral BS  Normal chest expansion  Not tachypneic,  No use of accessory muscles    GASTROINTESTINAL:  Abdomen soft,   Non-tender,   No guarding,   + BS    MUSCULOSKELETAL:   No clubbing, cyanosis    NEUROLOGICAL:   Alert     SKIN:   Skin normal color for race,   No evidence of rash.        11-02-23 @ 07:01  -  11-03-23 @ 07:00  --------------------------------------------------------  IN:    Lactated Ringers: 225 mL  Total IN: 225 mL    OUT:    Indwelling Catheter - Urethral (mL): 550 mL  Total OUT: 550 mL    Total NET: -325 mL          LABS:                            11.4   6.63  )-----------( 339      ( 03 Nov 2023 06:02 )             37.7                                               11-02    144  |  105  |  18  ----------------------------<  129<H>  4.1   |  27  |  0.8    Ca    9.0      02 Nov 2023 06:08  Mg     2.2     11-02    TPro  6.4  /  Alb  3.1<L>  /  TBili  0.2  /  DBili  x   /  AST  26  /  ALT  33  /  AlkPhos  96  11-02                                             Urinalysis Basic - ( 02 Nov 2023 06:08 )    Color: x / Appearance: x / SG: x / pH: x  Gluc: 129 mg/dL / Ketone: x  / Bili: x / Urobili: x   Blood: x / Protein: x / Nitrite: x   Leuk Esterase: x / RBC: x / WBC x   Sq Epi: x / Non Sq Epi: x / Bacteria: x                                                  LIVER FUNCTIONS - ( 02 Nov 2023 06:08 )  Alb: 3.1 g/dL / Pro: 6.4 g/dL / ALK PHOS: 96 U/L / ALT: 33 U/L / AST: 26 U/L / GGT: x                                                                                                                                       MEDICATIONS  (STANDING):  acetaminophen     Tablet .. 650 milliGRAM(s) Oral every 12 hours  chlorhexidine 2% Cloths 1 Application(s) Topical <User Schedule>  enoxaparin Injectable 55 milliGRAM(s) SubCutaneous every 12 hours  gabapentin 600 milliGRAM(s) Oral daily  lactated ringers Bolus 500 milliLiter(s) IV Bolus once  lactated ringers. 1000 milliLiter(s) (75 mL/Hr) IV Continuous <Continuous>  levETIRAcetam 500 milliGRAM(s) Oral two times a day  midodrine 10 milliGRAM(s) Oral every 8 hours  multivitamin 1 Tablet(s) Oral daily  pantoprazole    Tablet 40 milliGRAM(s) Oral two times a day  polyethylene glycol 3350 17 Gram(s) Oral daily  raloxifene 60 milliGRAM(s) Oral daily    MEDICATIONS  (PRN):  melatonin 5 milliGRAM(s) Oral at bedtime PRN Insomnia      New X-rays reviewed:                                                                                  ECHO

## 2023-11-03 NOTE — PROGRESS NOTE ADULT - SUBJECTIVE AND OBJECTIVE BOX
JERICHO TEA  58y, Female  Allergy: No Known Allergies      LOS  20d    CHIEF COMPLAINT: Pre-syncope (03 Nov 2023 16:28)      INTERVAL EVENTS/HPI  - No acute events overnight  - T(F): , Max: 98.1 (11-03-23 @ 16:07)    - WBC Count: 6.63 (11-03-23 @ 06:02)  WBC Count: 5.72 (11-02-23 @ 06:08)     - Creatinine: 0.8 (11-03-23 @ 06:02)  Creatinine: 0.8 (11-02-23 @ 06:08)       ROS  General: Denies rigors, nightsweats  HEENT: Denies headache, rhinorrhea, sore throat, eye pain  CV: Denies CP, palpitations  PULM: Denies wheezing, hemoptysis  GI: Denies hematemesis, hematochezia, melena  : Denies discharge, hematuria  MSK: Denies arthralgias, myalgias  SKIN: Denies rash, lesions  NEURO: Denies paresthesias, weakness  PSYCH: Denies depression, anxiety    VITALS:  T(F): 98.1, Max: 98.1 (11-03-23 @ 16:07)  HR: 84  BP: 122/58  RR: 18Vital Signs Last 24 Hrs  T(C): 36.7 (03 Nov 2023 16:07), Max: 36.7 (03 Nov 2023 16:07)  T(F): 98.1 (03 Nov 2023 16:07), Max: 98.1 (03 Nov 2023 16:07)  HR: 84 (03 Nov 2023 16:07) (74 - 99)  BP: 122/58 (03 Nov 2023 16:07) (101/54 - 122/58)  BP(mean): 74 (03 Nov 2023 12:11) (55 - 97)  RR: 18 (03 Nov 2023 16:07) (18 - 20)  SpO2: 93% (03 Nov 2023 16:07) (93% - 99%)    Parameters below as of 03 Nov 2023 12:11  Patient On (Oxygen Delivery Method): nasal cannula        PHYSICAL EXAM:  Gen: NAD, resting in bed  HEENT: Normocephalic, atraumatic  Neck: supple, no lymphadenopathy  CV: Regular rate & regular rhythm  Lungs: decreased BS at bases, no fremitus  Abdomen: Soft, BS present  Ext: Warm, well perfused  Neuro: non focal, awake  Skin: no rash, no erythema  Lines: no phlebitis    FH: Non-contributory  Social Hx: Non-contributory    TESTS & MEASUREMENTS:                        11.4   6.63  )-----------( 339      ( 03 Nov 2023 06:02 )             37.7     11-03    143  |  106  |  16  ----------------------------<  92  4.3   |  27  |  0.8    Ca    8.7      03 Nov 2023 06:02  Mg     2.2     11-03    TPro  6.0  /  Alb  3.1<L>  /  TBili  <0.2  /  DBili  x   /  AST  21  /  ALT  24  /  AlkPhos  90  11-03      LIVER FUNCTIONS - ( 03 Nov 2023 06:02 )  Alb: 3.1 g/dL / Pro: 6.0 g/dL / ALK PHOS: 90 U/L / ALT: 24 U/L / AST: 21 U/L / GGT: x           Urinalysis Basic - ( 03 Nov 2023 06:02 )    Color: x / Appearance: x / SG: x / pH: x  Gluc: 92 mg/dL / Ketone: x  / Bili: x / Urobili: x   Blood: x / Protein: x / Nitrite: x   Leuk Esterase: x / RBC: x / WBC x   Sq Epi: x / Non Sq Epi: x / Bacteria: x        Culture - Sputum (collected 10-17-23 @ 19:37)  Source: Trach Asp Tracheal Aspirate  Gram Stain (10-18-23 @ 02:39):    Numerous polymorphonuclear leukocytes per low power field    No Squamous epithelial cells per low power field    Moderate Yeast like cells seen per oil power field  Final Report (10-19-23 @ 21:17):    Normal Respiratory Mili present    Culture - Urine (collected 10-17-23 @ 19:37)  Source: Clean Catch Clean Catch (Midstream)  Final Report (10-19-23 @ 00:23):    No growth    Culture - Blood (collected 10-17-23 @ 12:35)  Source: .Blood Blood  Final Report (10-22-23 @ 22:00):    No growth at 5 days            INFECTIOUS DISEASES TESTING  Procalcitonin, Serum: 0.04 (11-02-23 @ 11:40)  Procalcitonin, Serum: 0.26 (10-24-23 @ 11:00)  MRSA PCR Result.: Negative (10-17-23 @ 17:20)  Fungitell: >500 (10-17-23 @ 17:20)  Procalcitonin, Serum: 4.36 (10-17-23 @ 17:20)  Procalcitonin, Serum: 4.18 (10-17-23 @ 12:35)  Procalcitonin, Serum: 0.07 (10-15-23 @ 07:28)  COVID-19 PCR: NotDetec (10-12-23 @ 09:14)  Procalcitonin, Serum: 0.40 (10-07-23 @ 10:54)  Legionella Antigen, Urine: Negative (09-30-23 @ 14:36)  MRSA PCR Result.: Negative (09-29-23 @ 16:50)  Procalcitonin, Serum: 9.78 (08-12-23 @ 11:25)  MRSA PCR Result.: Negative (08-12-23 @ 06:10)  Rapid RVP Result: NotDetec (08-12-23 @ 01:33)  Procalcitonin, Serum: 0.63 (08-10-23 @ 08:46)  Procalcitonin, Serum: 7.82 (08-04-23 @ 16:13)  MRSA PCR Result.: Negative (08-04-23 @ 14:52)  Rapid RVP Result: NotDetec (08-04-23 @ 03:00)      INFLAMMATORY MARKERS  Sedimentation Rate, Erythrocyte: 67 mm/Hr (10-15-23 @ 07:28)  C-Reactive Protein, Serum: 16.1 mg/L (10-15-23 @ 07:28)      RADIOLOGY & ADDITIONAL TESTS:  I have personally reviewed the last available Chest xray  CXR      CT      CARDIOLOGY TESTING  12 Lead ECG:   Ventricular Rate 67 BPM    Atrial Rate 67 BPM    P-R Interval 126 ms    QRS Duration 74 ms    Q-T Interval 428 ms    QTC Calculation(Bazett) 452 ms    P Axis 50 degrees    R Axis 38 degrees    T Axis 45 degrees    Diagnosis Line Normal sinus rhythm  Normal ECG    Confirmed by MARJAN MENDES MD ([id number]) on 11/3/2023 8:02:06 AM (11-02-23 @ 19:57)  12 Lead ECG:   Ventricular Rate 113 BPM    Atrial Rate 113 BPM    P-R Interval 118 ms    QRS Duration 62 ms    Q-T Interval 330 ms    QTC Calculation(Bazett) 452 ms    P Axis 55 degrees    R Axis 95 degrees    T Axis 33 degrees    Diagnosis Line Sinus tachycardiawith Premature supraventricular complexes and with  occasional Premature ventricular complexes with junctional escape complexes  Rightward axis  Nonspecific ST abnormality  Abnormal ECG    Confirmed by MARJAN MENDES MD ([id number]) on 11/2/2023 2:16:59 PM  (11-02-23 @ 10:42)      MEDICATIONS  acetaminophen     Tablet .. 650 Oral every 12 hours  chlorhexidine 2% Cloths 1 Topical <User Schedule>  enoxaparin Injectable 55 SubCutaneous every 12 hours  gabapentin 600 Oral daily  lactated ringers Bolus 500 IV Bolus once  lactated ringers. 1000 IV Continuous <Continuous>  levETIRAcetam 500 Oral two times a day  midodrine 10 Oral every 8 hours  multivitamin 1 Oral daily  pantoprazole    Tablet 40 Oral two times a day  polyethylene glycol 3350 17 Oral daily  raloxifene 60 Oral daily      WEIGHT  Weight (kg): 56.7 (11-02-23 @ 04:00)  Creatinine: 0.8 mg/dL (11-03-23 @ 06:02)      ANTIBIOTICS:      All available historical records have been reviewed

## 2023-11-03 NOTE — PROGRESS NOTE ADULT - SUBJECTIVE AND OBJECTIVE BOX
24H events:    Patient is a 58y old Female who presents with a chief complaint of Pre-syncope (03 Nov 2023 07:35)  Primary diagnosis of Pre-syncope  Today is hospital day 20d. This morning patient was seen and examined at bedside, resting comfortably in bed.    No acute or major events overnight.    Code Status:    Family communication:  Contact date:  Name of person contacted:  Relationship to patient:  Communication details:  What matters most:    PAST MEDICAL & SURGICAL HISTORY  Down syndrome    Osteoporosis    Mild anemia    Neuropathy    S/P debridement  of R hip on 3/2/21      SOCIAL HISTORY:  Social History:  Lives at nursing home (14 Oct 2023 20:33)      ALLERGIES:  No Known Allergies    MEDICATIONS:  STANDING MEDICATIONS  acetaminophen     Tablet .. 650 milliGRAM(s) Oral every 12 hours  chlorhexidine 2% Cloths 1 Application(s) Topical <User Schedule>  enoxaparin Injectable 55 milliGRAM(s) SubCutaneous every 12 hours  gabapentin 600 milliGRAM(s) Oral daily  lactated ringers Bolus 500 milliLiter(s) IV Bolus once  lactated ringers. 1000 milliLiter(s) IV Continuous <Continuous>  levETIRAcetam 500 milliGRAM(s) Oral two times a day  midodrine 10 milliGRAM(s) Oral every 8 hours  multivitamin 1 Tablet(s) Oral daily  pantoprazole    Tablet 40 milliGRAM(s) Oral two times a day  polyethylene glycol 3350 17 Gram(s) Oral daily  raloxifene 60 milliGRAM(s) Oral daily    PRN MEDICATIONS  melatonin 5 milliGRAM(s) Oral at bedtime PRN    VITALS:   T(F): 96.5  HR: 86  BP: 108/76  RR: 18  SpO2: 97%    PHYSICAL EXAM:    GENERAL: NAD, lying in bed comfortably. on HFNC. nor verbal   HEAD:  Atraumatic, normocephalic  EYES: EOMI, PERRLA   ENT: Moist mucous membranes  NECK: Supple  HEART: Regular rate and rhythm, no murmurs  LUNGS: Unlabored respirations.  Clear to auscultation bilaterally  ABDOMEN: Soft, nontender, nondistended  EXTREMITIES: 2+ peripheral pulses bilaterally  NERVOUS SYSTEM:  awake, alert, does not follow commands        AMPAC score:    (  ) Indwelling Madsen Catheter:   Date insterted:    Reason (  ) Critical illness     (  ) urinary retention    (  ) Accurate Ins/Outs Monitoring     (  ) CMO patient    (  ) Central Line:   Date inserted:  Location: (  ) Right IJ     (  ) Left IJ     (  ) Right Fem     (  ) Left Fem    (  ) SPC        (  ) pigtail       (  ) PEG tube       (  ) colostomy       (  ) jejunostomy  (  ) U-Dall    LABS:                        11.4   6.63  )-----------( 339      ( 03 Nov 2023 06:02 )             37.7     11-03    143  |  106  |  16  ----------------------------<  92  4.3   |  27  |  0.8    Ca    8.7      03 Nov 2023 06:02  Mg     2.2     11-03    TPro  6.0  /  Alb  3.1<L>  /  TBili  <0.2  /  DBili  x   /  AST  21  /  ALT  24  /  AlkPhos  90  11-03      Urinalysis Basic - ( 03 Nov 2023 06:02 )    Color: x / Appearance: x / SG: x / pH: x  Gluc: 92 mg/dL / Ketone: x  / Bili: x / Urobili: x   Blood: x / Protein: x / Nitrite: x   Leuk Esterase: x / RBC: x / WBC x   Sq Epi: x / Non Sq Epi: x / Bacteria: x                RADIOLOGY:

## 2023-11-03 NOTE — SWALLOW BEDSIDE ASSESSMENT ADULT - SWALLOW EVAL: FEEDING ASSISTANCE
1:1 feed/dependent

## 2023-11-03 NOTE — SWALLOW BEDSIDE ASSESSMENT ADULT - ORAL PHASE
Delayed oral transit time

## 2023-11-03 NOTE — SWALLOW BEDSIDE ASSESSMENT ADULT - SLP GENERAL OBSERVATIONS
Pt found laying in bed w/ significant lethargy. Pt unable to follow simple commands provided by SLP. Pt unable to keep eyes open for 1 minute at a time.  SLP used an empty spoon to put in the pt's mouth. Pt did not open her mouth when the spoon touched her lips. Pt is not a candidate for PO trials at this time.
pt received in bed awake alert in no apparent pain. +HFNC 40L/min, 43% O2; staff reports pt tolerating current diet
pt received in bed awake alert in no apparent pain. +4L NC; +[facility] aide at bedside, reports pt tolerating current diet
pt received in bed awake alert in no apparent pain. +HFNC 50L/min, 53% O2 +B/L wrist restraints; staff reports pt tolerating current diet
Pt found laying in bed eyes open and turning to voice. pt not following commands at this time however much more alert. Pt is on o2 50/50 o2 via HFNC
pt received in bed awake alert in no apparent pain. +2L NC; +[facility] aide at bedside, reports pt tolerating current diet
pt received in bed awake alert in no apparent pain. +HFNC 50L/min, 49% O2 +B/L wrist restraints; staff reports pt tolerating current diet
pt received in bed awake alert in no apparent pain. +HFNC 50L/min, 50% O2 +B/L wrist restraints; staff reports pt tolerating current diet
pt received in bed awake alert in no apparent pain. +4L NC; +[facility] aide at bedside, reports pt tolerating current diet

## 2023-11-03 NOTE — SWALLOW BEDSIDE ASSESSMENT ADULT - ASR SWALLOW ASPIRATION MONITOR
change of breathing pattern/oral hygiene/position upright (90Y)/cough/gurgly voice/fever/pneumonia/throat clearing/upper respiratory infection

## 2023-11-03 NOTE — SWALLOW BEDSIDE ASSESSMENT ADULT - ORAL PREPARATORY PHASE
Reduced oral grading
delayed spoon stripping
Within functional limits
Reduced oral grading

## 2023-11-03 NOTE — PROGRESS NOTE ADULT - SUBJECTIVE AND OBJECTIVE BOX
Patient is a 58y old  Female who presents with a chief complaint of Pre-syncope (01 Nov 2023 11:10)    HPI:  Patient is a 58 year old female with a PMHx of Down syndrome, nonverbal at baseline, hypothyroidism, cerebral palsy, congenital pulmonary stenosis, Seizures, presents to the ED from nursing facility for syncope associated with tonic-clonic movement witnessed by aide. Initial vitals at nursing home showed bradycardia and hypoxia for about 10-15 minutes. No fevers, chill, cough, vomiting, diarrhea, difficulty breathing.     Hospital course 9/29/2023 -> 10/13/2023 Patient was admitted  for hemoptysis and duodenal perforation requiring intubation and ICU admission for hypovolemic and septic shock requiring temporary pressor support, which resolved. Patient also had acute hypoxemic respiratory failure likely due to multifocal pneumonia. Patient was successfully extubated and completed a course of antibiotics per ID, further infectious workup was unremarkable. Repeat CTAP with contrast showed no contrast extravasation and previous seen foci of retroperitoneal gas no longer identified. The patient was evaluated by surgery and GI, and no acute intervention recommended. Patient with stable hemoglobin. Hospital course complicated by NELA likely prerenal which resolved and NSTEMI likely type II due to shock (Echo 9/30 EF 55 to 60%, Normal left systolic function Diastolic function could not be assessed) with subsequent downtrending trops (0.34 --> 0.11). Patient's hospital course ICU --> Stepdown --> General medical floor. Patient remains hemodynamically stable on general medical, tolerating pureed feeds, and has bowel movements. Patient was discharged on 10/13/2023     (14 Oct 2023 20:33)    PAST MEDICAL & SURGICAL HISTORY:  Down syndrome  Osteoporosis  Mild anemia  Neuropathy  S/P debridement  of R hip on 3/2/21    patient seen and examined independently on morning rounds in SDU, chart reviewed and discussed with the medicine resident and on interdisciplinary rounds.    Hospital Day #20    remains in SDU ---respiratory status better today- tolerating 4L O2 via NC  planning for downgrade to regular medical floor today      PE:  GEN-NAD, alert and awake-eyes open-nonverbal-appears comfortable   PULM- fair air entry, decreased bs bilateral bases  CVS- +s1/s2 rrr  GI- soft NT ND +bs  EXT- contracted lower extremities bilat- Right foot dressing c/d/i        Labs:                        11.4   6.63  )-----------( 339      ( 03 Nov 2023 06:02 )             37.7     CBC Full  -  ( 03 Nov 2023 06:02 )  WBC Count : 6.63 K/uL  RBC Count : 4.67 M/uL  Hemoglobin : 11.4 g/dL  Hematocrit : 37.7 %  Platelet Count - Automated : 339 K/uL  Mean Cell Volume : 80.7 fL  Mean Cell Hemoglobin : 24.4 pg  Mean Cell Hemoglobin Concentration : 30.2 g/dL  Auto Neutrophil # : 3.56 K/uL  Auto Lymphocyte # : 1.84 K/uL  Auto Monocyte # : 0.83 K/uL  Auto Eosinophil # : 0.31 K/uL  Auto Basophil # : 0.07 K/uL  Auto Neutrophil % : 53.6 %  Auto Lymphocyte % : 27.8 %  Auto Monocyte % : 12.5 %  Auto Eosinophil % : 4.7 %  Auto Basophil % : 1.1 %      11-03    143  |  106  |  16  ----------------------------<  92  4.3   |  27  |  0.8    Ca    8.7      03 Nov 2023 06:02  Mg     2.2     11-03    TPro  6.0  /  Alb  3.1<L>  /  TBili  <0.2  /  DBili  x   /  AST  21  /  ALT  24  /  AlkPhos  90  11-03      Microbiology:      Vital Signs Last 24 Hrs  T(C): 36.7 (03 Nov 2023 16:07), Max: 36.7 (03 Nov 2023 16:07)  T(F): 98.1 (03 Nov 2023 16:07), Max: 98.1 (03 Nov 2023 16:07)  HR: 84 (03 Nov 2023 16:07) (74 - 99)  BP: 122/58 (03 Nov 2023 16:07) (101/54 - 122/58)  BP(mean): 74 (03 Nov 2023 12:11) (55 - 97)  RR: 18 (03 Nov 2023 16:07) (18 - 20)  SpO2: 93% (03 Nov 2023 16:07) (93% - 99%)    Parameters below as of 03 Nov 2023 12:11  Patient On (Oxygen Delivery Method): nasal cannula        I&O's Summary    02 Nov 2023 07:01  -  03 Nov 2023 07:00  --------------------------------------------------------  IN: 225 mL / OUT: 550 mL / NET: -325 mL    03 Nov 2023 07:01  -  03 Nov 2023 16:32  --------------------------------------------------------  IN: 0 mL / OUT: 400 mL / NET: -400 mL        MEDICATIONS  (STANDING):  chlorhexidine 2% Cloths 1 Application(s) Topical <User Schedule>  enoxaparin Injectable 55 milliGRAM(s) SubCutaneous every 12 hours  gabapentin 600 milliGRAM(s) Oral daily  levETIRAcetam 500 milliGRAM(s) Oral two times a day  midodrine 10 milliGRAM(s) Oral every 8 hours  multivitamin 1 Tablet(s) Oral daily  pantoprazole    Tablet 40 milliGRAM(s) Oral two times a day  polyethylene glycol 3350 17 Gram(s) Oral daily  raloxifene 60 milliGRAM(s) Oral daily

## 2023-11-03 NOTE — SWALLOW BEDSIDE ASSESSMENT ADULT - POSITIONING
upright (90 degrees)

## 2023-11-03 NOTE — PROGRESS NOTE ADULT - ASSESSMENT
IMPRESSION:    Acute hypoxemic respiratory failure improving   Aspiration pneumonia treated   DVT   Elevated Fungitell SP anti fungal course   HO recent duodenal perforation   foot OM  HO polymicrobial bacteremia   H/O CP  H/o seizures    PLAN:    CNS: Avoid CNS depressants     HEENT: Oral care.      PULMONARY: HOB at 45 degrees. Aspiration precaution.  Wean O2 as tolerated.  Keep SaO2 92 TO 96%. Encourage NIV during sleep. Pulmonary toilet.      CARDIOVASCULAR: Goal directed fluid resuscitation. Avoid overload.     GI: Protonix BID.  Feeding per speech 1:1.  Bowel regimen.       RENAL:  FU lytes.  Correct as needed.      INFECTIOUS DISEASE:  Monitor VS>      HEMATOLOGICAL: DVT therapy.  Monitor CBC. Lovenox therapeutic    ENDOCRINE:  Follow up FS.  Insulin protocol if needed.    MUSCULOSKELETAL: Bedrest.  Off loading.  Wound care.  Podiatry following     Prognosis guarded.      DGTF

## 2023-11-04 LAB
GLUCOSE BLDC GLUCOMTR-MCNC: 110 MG/DL — HIGH (ref 70–99)
GLUCOSE BLDC GLUCOMTR-MCNC: 110 MG/DL — HIGH (ref 70–99)

## 2023-11-04 RX ADMIN — Medication 650 MILLIGRAM(S): at 05:33

## 2023-11-04 RX ADMIN — MIDODRINE HYDROCHLORIDE 10 MILLIGRAM(S): 2.5 TABLET ORAL at 05:34

## 2023-11-04 RX ADMIN — ENOXAPARIN SODIUM 55 MILLIGRAM(S): 100 INJECTION SUBCUTANEOUS at 18:35

## 2023-11-04 RX ADMIN — Medication 650 MILLIGRAM(S): at 06:10

## 2023-11-04 RX ADMIN — PANTOPRAZOLE SODIUM 40 MILLIGRAM(S): 20 TABLET, DELAYED RELEASE ORAL at 18:36

## 2023-11-04 RX ADMIN — Medication 650 MILLIGRAM(S): at 18:35

## 2023-11-04 RX ADMIN — CHLORHEXIDINE GLUCONATE 1 APPLICATION(S): 213 SOLUTION TOPICAL at 05:36

## 2023-11-04 RX ADMIN — LEVETIRACETAM 500 MILLIGRAM(S): 250 TABLET, FILM COATED ORAL at 05:34

## 2023-11-04 RX ADMIN — ENOXAPARIN SODIUM 55 MILLIGRAM(S): 100 INJECTION SUBCUTANEOUS at 05:35

## 2023-11-04 RX ADMIN — LEVETIRACETAM 500 MILLIGRAM(S): 250 TABLET, FILM COATED ORAL at 18:36

## 2023-11-04 RX ADMIN — MIDODRINE HYDROCHLORIDE 10 MILLIGRAM(S): 2.5 TABLET ORAL at 21:09

## 2023-11-04 RX ADMIN — MIDODRINE HYDROCHLORIDE 10 MILLIGRAM(S): 2.5 TABLET ORAL at 18:34

## 2023-11-04 RX ADMIN — GABAPENTIN 600 MILLIGRAM(S): 400 CAPSULE ORAL at 12:56

## 2023-11-04 RX ADMIN — PANTOPRAZOLE SODIUM 40 MILLIGRAM(S): 20 TABLET, DELAYED RELEASE ORAL at 05:34

## 2023-11-04 RX ADMIN — Medication 1 TABLET(S): at 12:56

## 2023-11-04 NOTE — PROGRESS NOTE ADULT - ASSESSMENT
58 year old female with a PMHx of Down syndrome, nonverbal at baseline, hypothyroidism, cerebral palsy, congenital pulmonary stenosis, Seizures, presents to the ED from nursing facility for syncope associated with tonic-clonic movement witnessed by aide. Initial vitals at nursing home showed bradycardia and hypoxia for about 10-15 minutes. No fevers, chill, cough, vomiting, diarrhea, difficulty breathing.    Pt initialy was admitted to medical floor then on 10/17 was upgraded to ICU for respiratory distress and hypotension and now downgraded to CEU.    #Acute hypoxemic respiratory failure-2/2 Aspiration pneumonia  #DVT-nonocclusive of L common femoral vein  -respiratory status improving and remains stable on 4L o2 via nc  -plan for downgrade to medical floor today  -pulmonary/critical care following  -cont o2 suppl--tolerating 4L NC (goal o2 sat >90%)   -daily cxr  -aggressive pulm suctioning and chest PT--aspiration precautions including hob elevation (feed with assistance)  -therapeutic lovenox 55 mg sq bid  -completed 7 day caspo (for prior +fungitell)  -as respiratory status improving plan for podiatry f/u for further intervention for Right foot/metatarsal OM --currently holding abx to obtain better yield culture/bone biopsy however if becomes febrile would repeat blood cx and urine cx and start abx     #Right foot (1st and 2nd distal phalanx) OM and mutliple L foot deep tissue injuries (seen on xray and MRI)  -podiatry follow up for surgical intervention--would benefit from obtaining deep tissue/bone biopsy/culture while off abx (both MRI and XRAY with findings c/w OM)  -pain control--patient is not able to ask for prn medications---will continue tylenol standing q12 hr   -daily wound care/dressing change   -ID following---f/u repeat procal, fungitell  -continue IVF (LF @75cc/hr)  -frequent turning/off loading and position change as per protocol with local wound/skin care    #Chronic barlow for urinary retention  -monitor urine output     #Seizure- abnormal VEEG  -monitor neurochecks  -neurology following  -continue with keppra and f/u level    DVT/GI ppx  guarded prognosis    FULL CODE    pending: f/u with podiatry plan for Right foot/metatarsal OM and need for bx/culture

## 2023-11-04 NOTE — PROGRESS NOTE ADULT - SUBJECTIVE AND OBJECTIVE BOX
*IM ATTENDING NOTE*      TEA ECHAVARRIA  58y  Female      Patient is a 58y old  Female who presents with a chief complaint of Pre-syncope (03 Nov 2023 16:40)      INTERVAL HPI/OVERNIGHT EVENTS: Pt is nonverbal at baseline. She is awake, and did not look in any distress. Per the aide at bedside, no new issues reported.       Vital Signs Last 24 Hrs  T(C): 36.6 (04 Nov 2023 13:31), Max: 37.1 (03 Nov 2023 20:00)  T(F): 97.8 (04 Nov 2023 13:31), Max: 98.8 (03 Nov 2023 20:00)  HR: 65 (04 Nov 2023 13:31) (59 - 84)  BP: 92/53 (04 Nov 2023 13:31) (92/53 - 130/70)  RR: 18 (04 Nov 2023 13:31) (18 - 18)  SpO2: 95% (04 Nov 2023 05:07) (91% - 99%)    Parameters below as of 04 Nov 2023 00:17  Patient On (Oxygen Delivery Method): room air      11-03-23 @ 07:01  -  11-04-23 @ 07:00  --------------------------------------------------------  IN: 0 mL / OUT: 1000 mL / NET: -1000 mL    11-04-23 @ 08:01  -  11-04-23 @ 13:35  --------------------------------------------------------  IN: 0 mL / OUT: 700 mL / NET: -700 mL      PHYSICAL EXAM:  GEN-NAD, alert and awake-eyes open-nonverbal-appears comfortable   PULM- fair air entry, decreased bs bilateral bases  CVS- +s1/s2 rrr  GI- soft NT ND +bs  EXT- contracted lower extremities bilat- Right foot dressing c/d/i    LABS                          11.4   6.63  )-----------( 339      ( 03 Nov 2023 06:02 )             37.7     11-03    143  |  106  |  16  ----------------------------<  92  4.3   |  27  |  0.8    Ca    8.7      03 Nov 2023 06:02  Mg     2.2     11-03    TPro  6.0  /  Alb  3.1<L>  /  TBili  <0.2  /  DBili  x   /  AST  21  /  ALT  24  /  AlkPhos  90  11-03

## 2023-11-05 LAB
1,3 BETA GLUCAN SER QL: POSITIVE
1,3 BETA GLUCAN SER QL: POSITIVE
ALBUMIN SERPL ELPH-MCNC: 3.4 G/DL — LOW (ref 3.5–5.2)
ALBUMIN SERPL ELPH-MCNC: 3.4 G/DL — LOW (ref 3.5–5.2)
ALP SERPL-CCNC: 102 U/L — SIGNIFICANT CHANGE UP (ref 30–115)
ALP SERPL-CCNC: 102 U/L — SIGNIFICANT CHANGE UP (ref 30–115)
ALT FLD-CCNC: 39 U/L — SIGNIFICANT CHANGE UP (ref 0–41)
ALT FLD-CCNC: 39 U/L — SIGNIFICANT CHANGE UP (ref 0–41)
ANION GAP SERPL CALC-SCNC: 12 MMOL/L — SIGNIFICANT CHANGE UP (ref 7–14)
ANION GAP SERPL CALC-SCNC: 12 MMOL/L — SIGNIFICANT CHANGE UP (ref 7–14)
AST SERPL-CCNC: 41 U/L — SIGNIFICANT CHANGE UP (ref 0–41)
AST SERPL-CCNC: 41 U/L — SIGNIFICANT CHANGE UP (ref 0–41)
BASOPHILS # BLD AUTO: 0.06 K/UL — SIGNIFICANT CHANGE UP (ref 0–0.2)
BASOPHILS # BLD AUTO: 0.06 K/UL — SIGNIFICANT CHANGE UP (ref 0–0.2)
BASOPHILS NFR BLD AUTO: 1.1 % — HIGH (ref 0–1)
BASOPHILS NFR BLD AUTO: 1.1 % — HIGH (ref 0–1)
BILIRUB SERPL-MCNC: 0.2 MG/DL — SIGNIFICANT CHANGE UP (ref 0.2–1.2)
BILIRUB SERPL-MCNC: 0.2 MG/DL — SIGNIFICANT CHANGE UP (ref 0.2–1.2)
BLD GP AB SCN SERPL QL: SIGNIFICANT CHANGE UP
BLD GP AB SCN SERPL QL: SIGNIFICANT CHANGE UP
BUN SERPL-MCNC: 12 MG/DL — SIGNIFICANT CHANGE UP (ref 10–20)
BUN SERPL-MCNC: 12 MG/DL — SIGNIFICANT CHANGE UP (ref 10–20)
CALCIUM SERPL-MCNC: 8.9 MG/DL — SIGNIFICANT CHANGE UP (ref 8.4–10.5)
CALCIUM SERPL-MCNC: 8.9 MG/DL — SIGNIFICANT CHANGE UP (ref 8.4–10.5)
CHLORIDE SERPL-SCNC: 102 MMOL/L — SIGNIFICANT CHANGE UP (ref 98–110)
CHLORIDE SERPL-SCNC: 102 MMOL/L — SIGNIFICANT CHANGE UP (ref 98–110)
CO2 SERPL-SCNC: 26 MMOL/L — SIGNIFICANT CHANGE UP (ref 17–32)
CO2 SERPL-SCNC: 26 MMOL/L — SIGNIFICANT CHANGE UP (ref 17–32)
CREAT SERPL-MCNC: 0.8 MG/DL — SIGNIFICANT CHANGE UP (ref 0.7–1.5)
CREAT SERPL-MCNC: 0.8 MG/DL — SIGNIFICANT CHANGE UP (ref 0.7–1.5)
EGFR: 86 ML/MIN/1.73M2 — SIGNIFICANT CHANGE UP
EGFR: 86 ML/MIN/1.73M2 — SIGNIFICANT CHANGE UP
EOSINOPHIL # BLD AUTO: 0.3 K/UL — SIGNIFICANT CHANGE UP (ref 0–0.7)
EOSINOPHIL # BLD AUTO: 0.3 K/UL — SIGNIFICANT CHANGE UP (ref 0–0.7)
EOSINOPHIL NFR BLD AUTO: 5.5 % — SIGNIFICANT CHANGE UP (ref 0–8)
EOSINOPHIL NFR BLD AUTO: 5.5 % — SIGNIFICANT CHANGE UP (ref 0–8)
FUNGITELL: 115 PG/ML
FUNGITELL: 115 PG/ML
GLUCOSE SERPL-MCNC: 88 MG/DL — SIGNIFICANT CHANGE UP (ref 70–99)
GLUCOSE SERPL-MCNC: 88 MG/DL — SIGNIFICANT CHANGE UP (ref 70–99)
HCT VFR BLD CALC: 40.3 % — SIGNIFICANT CHANGE UP (ref 37–47)
HCT VFR BLD CALC: 40.3 % — SIGNIFICANT CHANGE UP (ref 37–47)
HGB BLD-MCNC: 12.4 G/DL — SIGNIFICANT CHANGE UP (ref 12–16)
HGB BLD-MCNC: 12.4 G/DL — SIGNIFICANT CHANGE UP (ref 12–16)
IMM GRANULOCYTES NFR BLD AUTO: 0.2 % — SIGNIFICANT CHANGE UP (ref 0.1–0.3)
IMM GRANULOCYTES NFR BLD AUTO: 0.2 % — SIGNIFICANT CHANGE UP (ref 0.1–0.3)
LYMPHOCYTES # BLD AUTO: 1.83 K/UL — SIGNIFICANT CHANGE UP (ref 1.2–3.4)
LYMPHOCYTES # BLD AUTO: 1.83 K/UL — SIGNIFICANT CHANGE UP (ref 1.2–3.4)
LYMPHOCYTES # BLD AUTO: 33.6 % — SIGNIFICANT CHANGE UP (ref 20.5–51.1)
LYMPHOCYTES # BLD AUTO: 33.6 % — SIGNIFICANT CHANGE UP (ref 20.5–51.1)
MAGNESIUM SERPL-MCNC: 2.4 MG/DL — SIGNIFICANT CHANGE UP (ref 1.8–2.4)
MAGNESIUM SERPL-MCNC: 2.4 MG/DL — SIGNIFICANT CHANGE UP (ref 1.8–2.4)
MCHC RBC-ENTMCNC: 24.7 PG — LOW (ref 27–31)
MCHC RBC-ENTMCNC: 24.7 PG — LOW (ref 27–31)
MCHC RBC-ENTMCNC: 30.8 G/DL — LOW (ref 32–37)
MCHC RBC-ENTMCNC: 30.8 G/DL — LOW (ref 32–37)
MCV RBC AUTO: 80.1 FL — LOW (ref 81–99)
MCV RBC AUTO: 80.1 FL — LOW (ref 81–99)
MONOCYTES # BLD AUTO: 0.55 K/UL — SIGNIFICANT CHANGE UP (ref 0.1–0.6)
MONOCYTES # BLD AUTO: 0.55 K/UL — SIGNIFICANT CHANGE UP (ref 0.1–0.6)
MONOCYTES NFR BLD AUTO: 10.1 % — HIGH (ref 1.7–9.3)
MONOCYTES NFR BLD AUTO: 10.1 % — HIGH (ref 1.7–9.3)
NEUTROPHILS # BLD AUTO: 2.69 K/UL — SIGNIFICANT CHANGE UP (ref 1.4–6.5)
NEUTROPHILS # BLD AUTO: 2.69 K/UL — SIGNIFICANT CHANGE UP (ref 1.4–6.5)
NEUTROPHILS NFR BLD AUTO: 49.5 % — SIGNIFICANT CHANGE UP (ref 42.2–75.2)
NEUTROPHILS NFR BLD AUTO: 49.5 % — SIGNIFICANT CHANGE UP (ref 42.2–75.2)
NRBC # BLD: 0 /100 WBCS — SIGNIFICANT CHANGE UP (ref 0–0)
NRBC # BLD: 0 /100 WBCS — SIGNIFICANT CHANGE UP (ref 0–0)
PLATELET # BLD AUTO: 310 K/UL — SIGNIFICANT CHANGE UP (ref 130–400)
PLATELET # BLD AUTO: 310 K/UL — SIGNIFICANT CHANGE UP (ref 130–400)
PMV BLD: 10.4 FL — SIGNIFICANT CHANGE UP (ref 7.4–10.4)
PMV BLD: 10.4 FL — SIGNIFICANT CHANGE UP (ref 7.4–10.4)
POTASSIUM SERPL-MCNC: 5.2 MMOL/L — HIGH (ref 3.5–5)
POTASSIUM SERPL-MCNC: 5.2 MMOL/L — HIGH (ref 3.5–5)
POTASSIUM SERPL-SCNC: 5.2 MMOL/L — HIGH (ref 3.5–5)
POTASSIUM SERPL-SCNC: 5.2 MMOL/L — HIGH (ref 3.5–5)
PROT SERPL-MCNC: 6.6 G/DL — SIGNIFICANT CHANGE UP (ref 6–8)
PROT SERPL-MCNC: 6.6 G/DL — SIGNIFICANT CHANGE UP (ref 6–8)
RBC # BLD: 5.03 M/UL — SIGNIFICANT CHANGE UP (ref 4.2–5.4)
RBC # BLD: 5.03 M/UL — SIGNIFICANT CHANGE UP (ref 4.2–5.4)
RBC # FLD: 28.7 % — HIGH (ref 11.5–14.5)
RBC # FLD: 28.7 % — HIGH (ref 11.5–14.5)
SODIUM SERPL-SCNC: 140 MMOL/L — SIGNIFICANT CHANGE UP (ref 135–146)
SODIUM SERPL-SCNC: 140 MMOL/L — SIGNIFICANT CHANGE UP (ref 135–146)
WBC # BLD: 5.44 K/UL — SIGNIFICANT CHANGE UP (ref 4.8–10.8)
WBC # BLD: 5.44 K/UL — SIGNIFICANT CHANGE UP (ref 4.8–10.8)
WBC # FLD AUTO: 5.44 K/UL — SIGNIFICANT CHANGE UP (ref 4.8–10.8)
WBC # FLD AUTO: 5.44 K/UL — SIGNIFICANT CHANGE UP (ref 4.8–10.8)

## 2023-11-05 PROCEDURE — 99233 SBSQ HOSP IP/OBS HIGH 50: CPT

## 2023-11-05 RX ADMIN — LEVETIRACETAM 500 MILLIGRAM(S): 250 TABLET, FILM COATED ORAL at 05:27

## 2023-11-05 RX ADMIN — GABAPENTIN 600 MILLIGRAM(S): 400 CAPSULE ORAL at 13:22

## 2023-11-05 RX ADMIN — Medication 650 MILLIGRAM(S): at 05:59

## 2023-11-05 RX ADMIN — MIDODRINE HYDROCHLORIDE 10 MILLIGRAM(S): 2.5 TABLET ORAL at 05:27

## 2023-11-05 RX ADMIN — ENOXAPARIN SODIUM 55 MILLIGRAM(S): 100 INJECTION SUBCUTANEOUS at 17:42

## 2023-11-05 RX ADMIN — Medication 650 MILLIGRAM(S): at 05:28

## 2023-11-05 RX ADMIN — PANTOPRAZOLE SODIUM 40 MILLIGRAM(S): 20 TABLET, DELAYED RELEASE ORAL at 05:27

## 2023-11-05 RX ADMIN — MIDODRINE HYDROCHLORIDE 10 MILLIGRAM(S): 2.5 TABLET ORAL at 21:06

## 2023-11-05 RX ADMIN — Medication 650 MILLIGRAM(S): at 17:41

## 2023-11-05 RX ADMIN — PANTOPRAZOLE SODIUM 40 MILLIGRAM(S): 20 TABLET, DELAYED RELEASE ORAL at 17:41

## 2023-11-05 RX ADMIN — MIDODRINE HYDROCHLORIDE 10 MILLIGRAM(S): 2.5 TABLET ORAL at 13:22

## 2023-11-05 RX ADMIN — LEVETIRACETAM 500 MILLIGRAM(S): 250 TABLET, FILM COATED ORAL at 17:41

## 2023-11-05 RX ADMIN — CHLORHEXIDINE GLUCONATE 1 APPLICATION(S): 213 SOLUTION TOPICAL at 05:30

## 2023-11-05 RX ADMIN — ENOXAPARIN SODIUM 55 MILLIGRAM(S): 100 INJECTION SUBCUTANEOUS at 05:26

## 2023-11-05 RX ADMIN — Medication 1 TABLET(S): at 13:22

## 2023-11-05 NOTE — PROGRESS NOTE ADULT - ASSESSMENT
#Acute hypoxemic respiratory failure-2/2 Aspiration pneumonia  #DVT-nonocclusive of L common femoral vein  #Right foot (1st and 2nd distal phalanx) OM and multiple L foot deep tissue injuries (seen on xray and MRI)  #Chronic barlow for urinary retention  #Seizure- abnormal VEEG    a 58 year old female with a PMHx of Down syndrome, nonverbal at baseline, hypothyroidism, cerebral palsy, congenital pulmonary stenosis, Seizures, presents to the ED from nursing facility for syncope associated with tonic-clonic movement witnessed by aide. Initial vitals at nursing home showed bradycardia and hypoxia for about 10-15 minutes. No fevers, chill, cough, vomiting, diarrhea, difficulty breathing.    Pt initial was admitted to medical floor then on 10/17 was upgraded to ICU for respiratory distress and hypotension and now downgraded to CEU.  -respiratory status improving and remains stable on 4L o2 via nc  -plan for downgrade to medical floor today  -pulmonary/critical care following  -as respiratory status improving plan for podiatry f/u for further intervention for Right foot/metatarsal OM   -currently holding abx to obtain better yield culture/bone biopsy however if becomes febrile would repeat blood cx and urine cx and start abx   -podiatry follow up for surgical intervention--would benefit from obtaining deep tissue/bone biopsy/culture while off abx (both MRI and XRAY with findings c/w OM)  -ID following    plan:   -daily cxr  -f/u repeat procal,   -fungitell + 115  -cont o2 suppl-tolerating 4L NC (goal o2 sat >90%)   -therapeutic Lovenox 55 mg sq bid  -completed 7 day caspo (for prior +fungitell)  -podiatry, neurology , ID following  -daily wound care/dressing change   -ID following-f/u repeat procal, fungitell  -continue IVF (LF @75cc/hr) as needed  -aggressive pulm suctioning and chest PT--aspiration precautions including hob elevation (feed with assistance)  -frequent turning/off loading and position change as per protocol with local wound/skin care  -monitor urine output   -monitor neuro checks  -continue with Keppra and f/u level    DVT/ GI ppx  guarded prognosis  FULL CODE    pending: f/u with podiatry plan for Right foot/metatarsal OM and need for bx/culture

## 2023-11-05 NOTE — CHART NOTE - NSCHARTNOTEFT_GEN_A_CORE
Patient is scheduled for surgical debridement of soft tissue and bone, Right foot  Tomorrow, 11/6/23  Please obtain medica clearance, optimize lab values prior to OR    Thank You  Podiatry#[id number]

## 2023-11-05 NOTE — PROGRESS NOTE ADULT - ASSESSMENT
58 year old female with a PMHx of Down syndrome, nonverbal at baseline, hypothyroidism, cerebral palsy, congenital pulmonary stenosis, Seizures, presents to the ED from nursing facility for syncope associated with tonic-clonic movement witnessed by aide. Initial vitals at nursing home showed bradycardia and hypoxia for about 10-15 minutes. No fevers, chill, cough, vomiting, diarrhea, difficulty breathing.    Pt initial was admitted to medical floor then on 10/17 was upgraded to ICU for respiratory distress and hypotension and now downgraded to CEU.    #Acute hypoxemic respiratory failure-2/2 Aspiration pneumonia  #DVT-nonocclusive of L common femoral vein  #Right foot (1st and 2nd distal phalanx) OM and multiple L foot deep tissue injuries (seen on xray and MRI)  #Chronic barlow for urinary retention  #Seizure- abnormal VEEG  -respiratory status improving and remains stable on 4L o2 via nc  -pulmonary/critical care following  -currently holding abx to obtain better yield culture/bone biopsy however if becomes febrile would repeat blood cx and urine cx and start abx   -podiatry follow up for surgical intervention--would benefit from obtaining deep tissue/bone biopsy/culture while off abx (both MRI and XRAY with findings c/w OM)  -ID following  - Patient is scheduled for surgical debridement of soft tissue and bone, Right foot, Tomorrow, 11/6/23      plan:   -daily cxr  -f/u repeat procal,   -fungitell + 115  -cont o2 suppl-tolerating 4L NC (goal o2 sat >90%)   -therapeutic Lovenox 55 mg sq bid  -completed 7 day caspo (for prior +fungitell)  -podiatry, neurology , ID following  -daily wound care/dressing change   -ID following-f/u repeat procal, fungitell  -continue IVF (LF @75cc/hr) as needed  -aggressive pulm suctioning and chest PT--aspiration precautions including hob elevation (feed with assistance)  -frequent turning/off loading and position change as per protocol with local wound/skin care  -monitor urine output   -monitor neuro checks  -continue with Keppra and f/u level    DVT/ GI ppx  guarded prognosis  FULL CODE    pending: f/u with podiatry plan for Right foot/metatarsal OM and need for bx/culture

## 2023-11-05 NOTE — PROGRESS NOTE ADULT - SUBJECTIVE AND OBJECTIVE BOX
TEA ECHAVARRIA 58y Female  MRN#: [id number]   Hospital Day: 22d    SUBJECTIVE  Patient is a 58y old Female who presents with a chief complaint of Pre-syncope (04 Nov 2023 13:34)  Currently admitted to medicine with the primary diagnosis of Pre-syncope      INTERVAL HPI AND OVERNIGHT EVENTS:  Patient was examined and seen at bedside. This morning she is resting comfortably in bed and reports no issues or overnight events.  clam, no issues as per nursing     REVIEW OF SYMPTOMS:  deferred     OBJECTIVE  PAST MEDICAL & SURGICAL HISTORY  Down syndrome    Osteoporosis    Mild anemia    Neuropathy    S/P debridement  of R hip on 3/2/21      ALLERGIES:  No Known Allergies    MEDICATIONS:  STANDING MEDICATIONS  acetaminophen     Tablet .. 650 milliGRAM(s) Oral every 12 hours  chlorhexidine 2% Cloths 1 Application(s) Topical <User Schedule>  enoxaparin Injectable 55 milliGRAM(s) SubCutaneous every 12 hours  gabapentin 600 milliGRAM(s) Oral daily  lactated ringers Bolus 500 milliLiter(s) IV Bolus once  lactated ringers. 1000 milliLiter(s) IV Continuous <Continuous>  levETIRAcetam 500 milliGRAM(s) Oral two times a day  midodrine 10 milliGRAM(s) Oral every 8 hours  multivitamin 1 Tablet(s) Oral daily  pantoprazole    Tablet 40 milliGRAM(s) Oral two times a day  polyethylene glycol 3350 17 Gram(s) Oral daily  raloxifene 60 milliGRAM(s) Oral daily    PRN MEDICATIONS  melatonin 5 milliGRAM(s) Oral at bedtime PRN      VITAL SIGNS: Last 24 Hours  T(C): 36.2 (05 Nov 2023 04:30), Max: 36.4 (04 Nov 2023 20:46)  T(F): 97.2 (05 Nov 2023 04:30), Max: 97.6 (04 Nov 2023 20:46)  HR: 79 (05 Nov 2023 14:11) (54 - 79)  BP: 115/68 (05 Nov 2023 14:11) (98/50 - 115/68)  BP(mean): --  RR: 18 (05 Nov 2023 14:11) (18 - 20)  SpO2: --    PHYSICAL EXAM:  GENERAL: NAD,   HEENT: NC/AT,  CHEST/LUNG: Clear to auscultation bilaterally; No rales, rhonchi, wheezing  HEART: Regular rate and rhythm;   ABDOMEN: Soft, Nontender, Nondistended; Bowel sounds present  EXTREMITIES: no edema, b/l foot dressed   NEURO:  intellectual disability, bedbound   SKIN: No rashes or lesions    LABS:                        12.4   5.44  )-----------( 310      ( 05 Nov 2023 07:41 )             40.3     11-05    140  |  102  |  12  ----------------------------<  88  5.2<H>   |  26  |  0.8    Ca    8.9      05 Nov 2023 07:41  Mg     2.4     11-05    TPro  6.6  /  Alb  3.4<L>  /  TBili  0.2  /  DBili  x   /  AST  41  /  ALT  39  /  AlkPhos  102  11-05      Urinalysis Basic - ( 05 Nov 2023 07:41 )    Color: x / Appearance: x / SG: x / pH: x  Gluc: 88 mg/dL / Ketone: x  / Bili: x / Urobili: x   Blood: x / Protein: x / Nitrite: x   Leuk Esterase: x / RBC: x / WBC x   Sq Epi: x / Non Sq Epi: x / Bacteria: x    RADIOLOGY:  Reviewed

## 2023-11-05 NOTE — PROGRESS NOTE ADULT - SUBJECTIVE AND OBJECTIVE BOX
24H events:    Patient is a 58y old Female who presents with a chief complaint of Pre-syncope (05 Nov 2023 14:34)    Primary diagnosis of Pre-syncope    Today is hospital day 22d. This morning patient was seen and examined at bedside, resting comfortably in bed.    No acute or major events overnight.      PAST MEDICAL & SURGICAL HISTORY  Down syndrome    Osteoporosis    Mild anemia    Neuropathy    S/P debridement  of R hip on 3/2/21      SOCIAL HISTORY:  Social History:  Lives at nursing home (14 Oct 2023 20:33)      ALLERGIES:  No Known Allergies    MEDICATIONS:  STANDING MEDICATIONS  acetaminophen     Tablet .. 650 milliGRAM(s) Oral every 12 hours  chlorhexidine 2% Cloths 1 Application(s) Topical <User Schedule>  enoxaparin Injectable 55 milliGRAM(s) SubCutaneous every 12 hours  gabapentin 600 milliGRAM(s) Oral daily  lactated ringers Bolus 500 milliLiter(s) IV Bolus once  lactated ringers. 1000 milliLiter(s) IV Continuous <Continuous>  levETIRAcetam 500 milliGRAM(s) Oral two times a day  midodrine 10 milliGRAM(s) Oral every 8 hours  multivitamin 1 Tablet(s) Oral daily  pantoprazole    Tablet 40 milliGRAM(s) Oral two times a day  polyethylene glycol 3350 17 Gram(s) Oral daily  raloxifene 60 milliGRAM(s) Oral daily    PRN MEDICATIONS  melatonin 5 milliGRAM(s) Oral at bedtime PRN    VITALS:   T(F): 98.6  HR: 65  BP: 101/54  RR: 18  SpO2: --    PHYSICAL EXAM:  GENERAL:   ( X ) NAD, lying in bed comfortably     (  ) obtunded     (  ) lethargic     (  ) somnolent    HEAD:   (X  ) Atraumatic     (  ) hematoma     (  ) laceration (specify location:       )     NECK:  (X  ) Supple     (  ) neck stiffness     (  ) nuchal rigidity     (  )  no JVD     (  ) JVD present ( -- cm)    HEART:  Rate -->     ( X ) normal rate     (  ) bradycardic     (  ) tachycardic  Rhythm -->     ( X ) regular     (  ) regularly irregular     (  ) irregularly irregular  Murmurs -->     (  ) normal s1s2     (  ) systolic murmur     (  ) diastolic murmur     (  ) continuous murmur      (  ) S3 present     (  ) S4 present    LUNGS:   (X  )Unlabored respirations     (  ) tachypnea  (  ) B/L air entry     (  ) decreased breath sounds in:  (location     )    (  ) no adventitious sound     (  ) crackles     (  ) wheezing      (  ) rhonchi      (specify location:       )  (  ) chest wall tenderness (specify location:       )    ABDOMEN:   ( X ) Soft     (  ) tense   |   (  ) nondistended     (  ) distended   |   (  ) +BS     (  ) hypoactive bowel sounds     (  ) hyperactive bowel sounds  (  ) nontender     (  ) RUQ tenderness     (  ) RLQ tenderness     (  ) LLQ tenderness     (  ) epigastric tenderness     (  ) diffuse tenderness  (  ) Splenomegaly      (  ) Hepatomegaly      (  ) Jaundice     (  ) ecchymosis     EXTREMITIES:  (  ) Normal     (  ) Rash     (  ) ecchymosis     (  ) varicose veins      (  ) pitting edema     (  ) non-pitting edema   (  ) ulceration     (  ) gangrene:     (location:     )    NERVOUS SYSTEM:    (X  ) A&Ox3     (  ) confused     (  ) lethargic  CN II-XII:     (  ) Intact     (  ) deficits found     (Specify:     )   Upper extremities:     (  ) no sensorimotor deficits     (  ) weakness     (  ) loss of proprioception/vibration     (  ) loss of touch/temperature (specify:    )  Lower extremities:     (  ) no sensorimotor deficits     (  ) weakness     (  ) loss of proprioception/vibration     (  ) loss of touch/temperature (specify:    )    SKIN:   (  ) No rashes or lesions     (  ) maculopapular rash     (  ) pustules     (  ) vesicles     (  ) ulcer     (  ) ecchymosis     (specify location:     )        LABS:                        12.4   5.44  )-----------( 310      ( 05 Nov 2023 07:41 )             40.3     11-05    140  |  102  |  12  ----------------------------<  88  5.2<H>   |  26  |  0.8    Ca    8.9      05 Nov 2023 07:41  Mg     2.4     11-05    TPro  6.6  /  Alb  3.4<L>  /  TBili  0.2  /  DBili  x   /  AST  41  /  ALT  39  /  AlkPhos  102  11-05      Urinalysis Basic - ( 05 Nov 2023 07:41 )    Color: x / Appearance: x / SG: x / pH: x  Gluc: 88 mg/dL / Ketone: x  / Bili: x / Urobili: x   Blood: x / Protein: x / Nitrite: x   Leuk Esterase: x / RBC: x / WBC x   Sq Epi: x / Non Sq Epi: x / Bacteria: x

## 2023-11-06 LAB
ALBUMIN SERPL ELPH-MCNC: 3.1 G/DL — LOW (ref 3.5–5.2)
ALP SERPL-CCNC: 106 U/L — SIGNIFICANT CHANGE UP (ref 30–115)
ALP SERPL-CCNC: 106 U/L — SIGNIFICANT CHANGE UP (ref 30–115)
ALP SERPL-CCNC: 111 U/L — SIGNIFICANT CHANGE UP (ref 30–115)
ALP SERPL-CCNC: 111 U/L — SIGNIFICANT CHANGE UP (ref 30–115)
ALT FLD-CCNC: 37 U/L — SIGNIFICANT CHANGE UP (ref 0–41)
ALT FLD-CCNC: 37 U/L — SIGNIFICANT CHANGE UP (ref 0–41)
ALT FLD-CCNC: 41 U/L — SIGNIFICANT CHANGE UP (ref 0–41)
ALT FLD-CCNC: 41 U/L — SIGNIFICANT CHANGE UP (ref 0–41)
ANION GAP SERPL CALC-SCNC: 13 MMOL/L — SIGNIFICANT CHANGE UP (ref 7–14)
ANION GAP SERPL CALC-SCNC: 13 MMOL/L — SIGNIFICANT CHANGE UP (ref 7–14)
ANION GAP SERPL CALC-SCNC: 9 MMOL/L — SIGNIFICANT CHANGE UP (ref 7–14)
ANION GAP SERPL CALC-SCNC: 9 MMOL/L — SIGNIFICANT CHANGE UP (ref 7–14)
APTT BLD: 33.1 SEC — SIGNIFICANT CHANGE UP (ref 27–39.2)
APTT BLD: 33.1 SEC — SIGNIFICANT CHANGE UP (ref 27–39.2)
AST SERPL-CCNC: 30 U/L — SIGNIFICANT CHANGE UP (ref 0–41)
AST SERPL-CCNC: 30 U/L — SIGNIFICANT CHANGE UP (ref 0–41)
AST SERPL-CCNC: 31 U/L — SIGNIFICANT CHANGE UP (ref 0–41)
AST SERPL-CCNC: 31 U/L — SIGNIFICANT CHANGE UP (ref 0–41)
BASOPHILS # BLD AUTO: 0.06 K/UL — SIGNIFICANT CHANGE UP (ref 0–0.2)
BASOPHILS # BLD AUTO: 0.06 K/UL — SIGNIFICANT CHANGE UP (ref 0–0.2)
BASOPHILS NFR BLD AUTO: 0.9 % — SIGNIFICANT CHANGE UP (ref 0–1)
BASOPHILS NFR BLD AUTO: 0.9 % — SIGNIFICANT CHANGE UP (ref 0–1)
BILIRUB SERPL-MCNC: 0.2 MG/DL — SIGNIFICANT CHANGE UP (ref 0.2–1.2)
BILIRUB SERPL-MCNC: 0.2 MG/DL — SIGNIFICANT CHANGE UP (ref 0.2–1.2)
BILIRUB SERPL-MCNC: <0.2 MG/DL — SIGNIFICANT CHANGE UP (ref 0.2–1.2)
BILIRUB SERPL-MCNC: <0.2 MG/DL — SIGNIFICANT CHANGE UP (ref 0.2–1.2)
BUN SERPL-MCNC: 12 MG/DL — SIGNIFICANT CHANGE UP (ref 10–20)
CALCIUM SERPL-MCNC: 8.7 MG/DL — SIGNIFICANT CHANGE UP (ref 8.4–10.5)
CALCIUM SERPL-MCNC: 8.7 MG/DL — SIGNIFICANT CHANGE UP (ref 8.4–10.5)
CALCIUM SERPL-MCNC: 8.8 MG/DL — SIGNIFICANT CHANGE UP (ref 8.4–10.5)
CALCIUM SERPL-MCNC: 8.8 MG/DL — SIGNIFICANT CHANGE UP (ref 8.4–10.5)
CHLORIDE SERPL-SCNC: 101 MMOL/L — SIGNIFICANT CHANGE UP (ref 98–110)
CHLORIDE SERPL-SCNC: 101 MMOL/L — SIGNIFICANT CHANGE UP (ref 98–110)
CHLORIDE SERPL-SCNC: 103 MMOL/L — SIGNIFICANT CHANGE UP (ref 98–110)
CHLORIDE SERPL-SCNC: 103 MMOL/L — SIGNIFICANT CHANGE UP (ref 98–110)
CO2 SERPL-SCNC: 26 MMOL/L — SIGNIFICANT CHANGE UP (ref 17–32)
CO2 SERPL-SCNC: 26 MMOL/L — SIGNIFICANT CHANGE UP (ref 17–32)
CO2 SERPL-SCNC: 28 MMOL/L — SIGNIFICANT CHANGE UP (ref 17–32)
CO2 SERPL-SCNC: 28 MMOL/L — SIGNIFICANT CHANGE UP (ref 17–32)
CREAT SERPL-MCNC: 0.7 MG/DL — SIGNIFICANT CHANGE UP (ref 0.7–1.5)
CREAT SERPL-MCNC: 0.7 MG/DL — SIGNIFICANT CHANGE UP (ref 0.7–1.5)
CREAT SERPL-MCNC: 0.8 MG/DL — SIGNIFICANT CHANGE UP (ref 0.7–1.5)
CREAT SERPL-MCNC: 0.8 MG/DL — SIGNIFICANT CHANGE UP (ref 0.7–1.5)
EGFR: 101 ML/MIN/1.73M2 — SIGNIFICANT CHANGE UP
EGFR: 101 ML/MIN/1.73M2 — SIGNIFICANT CHANGE UP
EGFR: 86 ML/MIN/1.73M2 — SIGNIFICANT CHANGE UP
EGFR: 86 ML/MIN/1.73M2 — SIGNIFICANT CHANGE UP
EOSINOPHIL # BLD AUTO: 0.32 K/UL — SIGNIFICANT CHANGE UP (ref 0–0.7)
EOSINOPHIL # BLD AUTO: 0.32 K/UL — SIGNIFICANT CHANGE UP (ref 0–0.7)
EOSINOPHIL NFR BLD AUTO: 5 % — SIGNIFICANT CHANGE UP (ref 0–8)
EOSINOPHIL NFR BLD AUTO: 5 % — SIGNIFICANT CHANGE UP (ref 0–8)
GLUCOSE SERPL-MCNC: 107 MG/DL — HIGH (ref 70–99)
GLUCOSE SERPL-MCNC: 107 MG/DL — HIGH (ref 70–99)
GLUCOSE SERPL-MCNC: 116 MG/DL — HIGH (ref 70–99)
GLUCOSE SERPL-MCNC: 116 MG/DL — HIGH (ref 70–99)
HCT VFR BLD CALC: 36.6 % — LOW (ref 37–47)
HCT VFR BLD CALC: 36.6 % — LOW (ref 37–47)
HCT VFR BLD CALC: 38.1 % — SIGNIFICANT CHANGE UP (ref 37–47)
HCT VFR BLD CALC: 38.1 % — SIGNIFICANT CHANGE UP (ref 37–47)
HGB BLD-MCNC: 11.4 G/DL — LOW (ref 12–16)
HGB BLD-MCNC: 11.4 G/DL — LOW (ref 12–16)
HGB BLD-MCNC: 11.8 G/DL — LOW (ref 12–16)
HGB BLD-MCNC: 11.8 G/DL — LOW (ref 12–16)
IMM GRANULOCYTES NFR BLD AUTO: 0.3 % — SIGNIFICANT CHANGE UP (ref 0.1–0.3)
IMM GRANULOCYTES NFR BLD AUTO: 0.3 % — SIGNIFICANT CHANGE UP (ref 0.1–0.3)
INR BLD: 1.03 RATIO — SIGNIFICANT CHANGE UP (ref 0.65–1.3)
INR BLD: 1.03 RATIO — SIGNIFICANT CHANGE UP (ref 0.65–1.3)
LYMPHOCYTES # BLD AUTO: 1.63 K/UL — SIGNIFICANT CHANGE UP (ref 1.2–3.4)
LYMPHOCYTES # BLD AUTO: 1.63 K/UL — SIGNIFICANT CHANGE UP (ref 1.2–3.4)
LYMPHOCYTES # BLD AUTO: 25.4 % — SIGNIFICANT CHANGE UP (ref 20.5–51.1)
LYMPHOCYTES # BLD AUTO: 25.4 % — SIGNIFICANT CHANGE UP (ref 20.5–51.1)
MAGNESIUM SERPL-MCNC: 2.1 MG/DL — SIGNIFICANT CHANGE UP (ref 1.8–2.4)
MAGNESIUM SERPL-MCNC: 2.1 MG/DL — SIGNIFICANT CHANGE UP (ref 1.8–2.4)
MAGNESIUM SERPL-MCNC: 2.2 MG/DL — SIGNIFICANT CHANGE UP (ref 1.8–2.4)
MAGNESIUM SERPL-MCNC: 2.2 MG/DL — SIGNIFICANT CHANGE UP (ref 1.8–2.4)
MCHC RBC-ENTMCNC: 24.7 PG — LOW (ref 27–31)
MCHC RBC-ENTMCNC: 24.7 PG — LOW (ref 27–31)
MCHC RBC-ENTMCNC: 25.1 PG — LOW (ref 27–31)
MCHC RBC-ENTMCNC: 25.1 PG — LOW (ref 27–31)
MCHC RBC-ENTMCNC: 31 G/DL — LOW (ref 32–37)
MCHC RBC-ENTMCNC: 31 G/DL — LOW (ref 32–37)
MCHC RBC-ENTMCNC: 31.1 G/DL — LOW (ref 32–37)
MCHC RBC-ENTMCNC: 31.1 G/DL — LOW (ref 32–37)
MCV RBC AUTO: 79.4 FL — LOW (ref 81–99)
MCV RBC AUTO: 79.4 FL — LOW (ref 81–99)
MCV RBC AUTO: 80.9 FL — LOW (ref 81–99)
MCV RBC AUTO: 80.9 FL — LOW (ref 81–99)
MONOCYTES # BLD AUTO: 0.44 K/UL — SIGNIFICANT CHANGE UP (ref 0.1–0.6)
MONOCYTES # BLD AUTO: 0.44 K/UL — SIGNIFICANT CHANGE UP (ref 0.1–0.6)
MONOCYTES NFR BLD AUTO: 6.9 % — SIGNIFICANT CHANGE UP (ref 1.7–9.3)
MONOCYTES NFR BLD AUTO: 6.9 % — SIGNIFICANT CHANGE UP (ref 1.7–9.3)
NEUTROPHILS # BLD AUTO: 3.94 K/UL — SIGNIFICANT CHANGE UP (ref 1.4–6.5)
NEUTROPHILS # BLD AUTO: 3.94 K/UL — SIGNIFICANT CHANGE UP (ref 1.4–6.5)
NEUTROPHILS NFR BLD AUTO: 61.5 % — SIGNIFICANT CHANGE UP (ref 42.2–75.2)
NEUTROPHILS NFR BLD AUTO: 61.5 % — SIGNIFICANT CHANGE UP (ref 42.2–75.2)
NRBC # BLD: 0 /100 WBCS — SIGNIFICANT CHANGE UP (ref 0–0)
PLATELET # BLD AUTO: 296 K/UL — SIGNIFICANT CHANGE UP (ref 130–400)
PLATELET # BLD AUTO: 296 K/UL — SIGNIFICANT CHANGE UP (ref 130–400)
PLATELET # BLD AUTO: 309 K/UL — SIGNIFICANT CHANGE UP (ref 130–400)
PLATELET # BLD AUTO: 309 K/UL — SIGNIFICANT CHANGE UP (ref 130–400)
PMV BLD: 10.2 FL — SIGNIFICANT CHANGE UP (ref 7.4–10.4)
POTASSIUM SERPL-MCNC: 4.1 MMOL/L — SIGNIFICANT CHANGE UP (ref 3.5–5)
POTASSIUM SERPL-MCNC: 4.1 MMOL/L — SIGNIFICANT CHANGE UP (ref 3.5–5)
POTASSIUM SERPL-MCNC: 4.4 MMOL/L — SIGNIFICANT CHANGE UP (ref 3.5–5)
POTASSIUM SERPL-MCNC: 4.4 MMOL/L — SIGNIFICANT CHANGE UP (ref 3.5–5)
POTASSIUM SERPL-SCNC: 4.1 MMOL/L — SIGNIFICANT CHANGE UP (ref 3.5–5)
POTASSIUM SERPL-SCNC: 4.1 MMOL/L — SIGNIFICANT CHANGE UP (ref 3.5–5)
POTASSIUM SERPL-SCNC: 4.4 MMOL/L — SIGNIFICANT CHANGE UP (ref 3.5–5)
POTASSIUM SERPL-SCNC: 4.4 MMOL/L — SIGNIFICANT CHANGE UP (ref 3.5–5)
PROT SERPL-MCNC: 6.2 G/DL — SIGNIFICANT CHANGE UP (ref 6–8)
PROT SERPL-MCNC: 6.2 G/DL — SIGNIFICANT CHANGE UP (ref 6–8)
PROT SERPL-MCNC: 6.7 G/DL — SIGNIFICANT CHANGE UP (ref 6–8)
PROT SERPL-MCNC: 6.7 G/DL — SIGNIFICANT CHANGE UP (ref 6–8)
PROTHROM AB SERPL-ACNC: 11.7 SEC — SIGNIFICANT CHANGE UP (ref 9.95–12.87)
PROTHROM AB SERPL-ACNC: 11.7 SEC — SIGNIFICANT CHANGE UP (ref 9.95–12.87)
RBC # BLD: 4.61 M/UL — SIGNIFICANT CHANGE UP (ref 4.2–5.4)
RBC # BLD: 4.61 M/UL — SIGNIFICANT CHANGE UP (ref 4.2–5.4)
RBC # BLD: 4.71 M/UL — SIGNIFICANT CHANGE UP (ref 4.2–5.4)
RBC # BLD: 4.71 M/UL — SIGNIFICANT CHANGE UP (ref 4.2–5.4)
RBC # FLD: 28.2 % — HIGH (ref 11.5–14.5)
RBC # FLD: 28.2 % — HIGH (ref 11.5–14.5)
RBC # FLD: SIGNIFICANT CHANGE UP % (ref 11.5–14.5)
RBC # FLD: SIGNIFICANT CHANGE UP % (ref 11.5–14.5)
SODIUM SERPL-SCNC: 140 MMOL/L — SIGNIFICANT CHANGE UP (ref 135–146)
WBC # BLD: 5.81 K/UL — SIGNIFICANT CHANGE UP (ref 4.8–10.8)
WBC # BLD: 5.81 K/UL — SIGNIFICANT CHANGE UP (ref 4.8–10.8)
WBC # BLD: 6.41 K/UL — SIGNIFICANT CHANGE UP (ref 4.8–10.8)
WBC # BLD: 6.41 K/UL — SIGNIFICANT CHANGE UP (ref 4.8–10.8)
WBC # FLD AUTO: 5.81 K/UL — SIGNIFICANT CHANGE UP (ref 4.8–10.8)
WBC # FLD AUTO: 5.81 K/UL — SIGNIFICANT CHANGE UP (ref 4.8–10.8)
WBC # FLD AUTO: 6.41 K/UL — SIGNIFICANT CHANGE UP (ref 4.8–10.8)
WBC # FLD AUTO: 6.41 K/UL — SIGNIFICANT CHANGE UP (ref 4.8–10.8)

## 2023-11-06 PROCEDURE — 99232 SBSQ HOSP IP/OBS MODERATE 35: CPT

## 2023-11-06 RX ADMIN — LEVETIRACETAM 500 MILLIGRAM(S): 250 TABLET, FILM COATED ORAL at 17:51

## 2023-11-06 RX ADMIN — Medication 650 MILLIGRAM(S): at 05:53

## 2023-11-06 RX ADMIN — LEVETIRACETAM 500 MILLIGRAM(S): 250 TABLET, FILM COATED ORAL at 05:21

## 2023-11-06 RX ADMIN — Medication 650 MILLIGRAM(S): at 17:52

## 2023-11-06 RX ADMIN — Medication 650 MILLIGRAM(S): at 05:20

## 2023-11-06 RX ADMIN — PANTOPRAZOLE SODIUM 40 MILLIGRAM(S): 20 TABLET, DELAYED RELEASE ORAL at 17:52

## 2023-11-06 RX ADMIN — ENOXAPARIN SODIUM 55 MILLIGRAM(S): 100 INJECTION SUBCUTANEOUS at 17:51

## 2023-11-06 RX ADMIN — MIDODRINE HYDROCHLORIDE 10 MILLIGRAM(S): 2.5 TABLET ORAL at 21:54

## 2023-11-06 RX ADMIN — PANTOPRAZOLE SODIUM 40 MILLIGRAM(S): 20 TABLET, DELAYED RELEASE ORAL at 05:20

## 2023-11-06 RX ADMIN — CHLORHEXIDINE GLUCONATE 1 APPLICATION(S): 213 SOLUTION TOPICAL at 05:21

## 2023-11-06 RX ADMIN — ENOXAPARIN SODIUM 55 MILLIGRAM(S): 100 INJECTION SUBCUTANEOUS at 05:21

## 2023-11-06 NOTE — PROGRESS NOTE ADULT - SUBJECTIVE AND OBJECTIVE BOX
JERICHO TEA  58y  Female  ***My note supersedes ALL resident notes that I sign.  My corrections for their notes are in my note.***    I can be reached directly on [employer] [id number]. My office number is [phone]. My personal cell number is [phone].    Hospital Day 23 - my 1st day w/ pt    INTERVAL EVENTS: Here for f/u of rt foot infection. Sx planned for Th 11/9. Pt is non-verbal. Seems wide awake and comfortable. Was cooperative w/ exam. I spoke w/ bedside aide from .    T(F): 96.5 (11-06-23 @ 12:30), Max: 98.6 (11-05-23 @ 20:23)  HR: 68 (11-06-23 @ 12:50) (65 - 68)  BP: 101/54 (11-06-23 @ 12:50) (101/54 - 146/66)  RR: 18 (11-06-23 @ 12:50) (18 - 18)  SpO2: 95% (11-06-23 @ 12:50) (95% - 95%)    11-05-23 @ 07:01  -  11-06-23 @ 07:00  --------------------------------------------------------  IN: 0 mL / OUT: 250 mL / NET: -250 mL    Gen: NAD; + Down's facies; + NC O2  HEENT: PERRL, nose clr  Neck: no nodes, no JVD, thyroid nl  lungs: clr  hrt: s1 s2 rrr no murmur  abd: soft, NT/ND, no HS megaly  ext: no edema, no c/c  rt foot: s/p 1st toe amp  neuro: aa ox3, cn intact, can move all 4 ext    LABS:                      11.8    (    80.9   6.41  )-----------( ---------      296      ( 06 Nov 2023 07:35 )             38.1    (    28.2     140   (   103   (   107      11-06-23 @ 07:35  ----------------------               4.4   (   28   (   12                             -----                        0.8  Ca  8.7   Mg  2.1    P   --     140   (   101   (   116      11-06-23 @ 01:05  ----------------------               4.1   (   26   (   12                             -----                        0.7  Ca  8.8   Mg  2.2    P   --     LFT  6.7  (  0.2  (  31       11-06-23 @ 07:35  -------------------------  3.1  (  111  (  41    Alb 3.1  T chichi 0.2     AST 31  ALT 41  11-06-23 @ 07:35  Alb 3.1  T chichi <0.2     AST 30  ALT 37  11-06-23 @ 01:05  Alb 3.4  T chichi 0.2     AST 41  ALT 39  11-05-23 @ 07:41  Alb 3.1  T chichi <0.2   AP 90  AST 21  ALT 24  11-03-23 @ 06:02  Alb 3.1  T chichi 0.2   AP 96  AST 26  ALT 33  11-02-23 @ 06:08    PT/INR - ( 06 Nov 2023 01:05 )   PT: 11.70 sec;   INR: 1.03 ratio    PTT - ( 06 Nov 2023 01:05 )  PTT: 33.1 sec    Urinalysis Basic - ( 06 Nov 2023 07:35 )    Color: x / Appearance: x / SG: x / pH: x  Gluc: 107 mg/dL / Ketone: x  / Bili: x / Urobili: x   Blood: x / Protein: x / Nitrite: x   Leuk Esterase: x / RBC: x / WBC x   Sq Epi: x / Non Sq Epi: x / Bacteria: x    CAPILLARY BLOOD GLUCOSE  POCT Blood Glucose.: 110 (11-04-23 @ 23:34)    RADIOLOGY & ADDITIONAL TESTS:  < from: Xray Chest 1 View- PORTABLE-Routine (Xray Chest 1 View- PORTABLE-Routine in AM.) (11.02.23 @ 06:21) >  IMPRESSION:    Slightly decreased left basal opacity. No pneumothorax.    Stable cardiomediastinal silhouette.    Unchanged osseous structures.    < end of copied text >    < from: [facility] Duplex Lower Ext Vein Scan, Bilat (11.01.23 @ 16:59) >  IMPRESSION:  DVT seen in the left common femoral vein.    Right lower extremity negative for DVT.    < end of copied text >    < from: CT Angio Chest PE Protocol w/ IV Cont (10.18.23 @ 10:39) >  Impression:    No CT evidence of pulmonary embolism.    Bilateral pneumonia.    Support devices as described.    < end of copied text >    < from: MR Foot No Cont, Right (10.16.23 @ 21:51) >  IMPRESSION:  1.  Limited exam.  2.  Osteomyelitis of the first metatarsal stump.  3.  Osteomyelitis of the second toe distal phalanx.    < end of copied text >    < from: Xray Foot AP + Lateral + Oblique, Right (10.15.23 @ 06:45) >  impression:    Again seen is patient post transmetatarsal amputation of the first ray   with periosteal reaction at the amputation stump, suspicious for   recurrent osteomyelitis.    There is second toe ulcer with osseous erosion/destruction at the second   distal phalangeal tuft, consistent with osteomyelitis. There is dorsal   foot soft tissue swelling. Skin abnormality at the plantar aspect of the   heel. Correlate for ulcer..    < end of copied text >    < from: CT Head No Cont (10.15.23 @ 00:08) >  IMPRESSION:  No acute intracranial pathology.    Diffuse cortical volume loss with more prominent atrophy of the anterior   frontal and temporal lobes with associated ventriculomegaly which is out  of proportion to the patient's stated age.    < end of copied text >    MEDICATIONS:    acetaminophen     Tablet .. 650 milliGRAM(s) Oral every 12 hours  chlorhexidine 2% Cloths 1 Application(s) Topical <User Schedule>  enoxaparin Injectable 55 milliGRAM(s) SubCutaneous every 12 hours  gabapentin 600 milliGRAM(s) Oral daily  levETIRAcetam 500 milliGRAM(s) Oral two times a day  melatonin 5 milliGRAM(s) Oral at bedtime PRN  midodrine 10 milliGRAM(s) Oral every 8 hours  multivitamin 1 Tablet(s) Oral daily  pantoprazole    Tablet 40 milliGRAM(s) Oral two times a day  polyethylene glycol 3350 17 Gram(s) Oral daily

## 2023-11-06 NOTE — PROGRESS NOTE ADULT - ASSESSMENT
58 year old female with a PMHx of Down syndrome, nonverbal at baseline, hypothyroidism, cerebral palsy, congenital pulmonary stenosis, Seizures, presents to the ED from nursing facility for syncope associated with tonic-clonic movement witnessed by aide. Initial vitals at nursing home showed bradycardia and hypoxia for about 10-15 minutes. No fevers, chill, cough, vomiting, diarrhea, difficulty breathing.    Pt initial was admitted to medical floor then on 10/17 was upgraded to ICU for respiratory distress and hypotension and now downgraded to CEU.    #Acute hypoxemic respiratory failure-2/2 Aspiration pneumonia  - Id has been following   - Daily CXR   -   -aggressive pulm suctioning and chest PT--aspiration precautions including hob elevation (feed with assistance)  -frequent turning/off loading and position change as per protocol with local wound/skin care    #Right foot (1st and 2nd distal phalanx) OM and multiple L foot deep tissue injuries (seen on xray and MRI)  - Patient was planned for surgical debridement of the soft tissue and bone ( Right Foot) today.   - Rescheduled on Thursday   - Wound care following   -       #DVT-nonocclusive of L common femoral vein  #Chronic barlow for urinary retention  #Seizure- abnormal VEEG  -respiratory status improving and remains stable on 4L o2 via nc  -pulmonary/critical care following  -currently holding abx to obtain better yield culture/bone biopsy however if becomes febrile would repeat blood cx and urine cx and start abx   -podiatry follow up for surgical intervention--would benefit from obtaining deep tissue/bone biopsy/culture while off abx (both MRI and XRAY with findings c/w OM)  -ID following  - Patient is scheduled for surgical debridement of soft tissue and bone, Right foot, Tomorrow, 11/6/23      plan:   -daily cxr  -f/u repeat procal,   -fungitell + 115  -cont o2 suppl-tolerating 4L NC (goal o2 sat >90%)   -therapeutic Lovenox 55 mg sq bid  -completed 7 day caspo (for prior +fungitell)  -podiatry, neurology , ID following  -daily wound care/dressing change   -ID following-f/u repeat procal, fungitell  -continue IVF (LF @75cc/hr) as needed  -aggressive pulm suctioning and chest PT--aspiration precautions including hob elevation (feed with assistance)  -frequent turning/off loading and position change as per protocol with local wound/skin care  -monitor urine output   -monitor neuro checks  -continue with Keppra and f/u level    DVT/ GI ppx  guarded prognosis  FULL CODE    pending: f/u with podiatry plan for Right foot/metatarsal OM and need for bx/culture     58 year old female with a PMHx of Down syndrome, nonverbal at baseline, hypothyroidism, cerebral palsy, congenital pulmonary stenosis, Seizures, presents to the ED from nursing facility for syncope associated with tonic-clonic movement witnessed by aide. Initial vitals at nursing home showed bradycardia and hypoxia for about 10-15 minutes. No fevers, chill, cough, vomiting, diarrhea, difficulty breathing.    Pt initial was admitted to medical floor then on 10/17 was upgraded to ICU for respiratory distress and hypotension and now downgraded to CEU.    #Acute hypoxemic respiratory failure-2/2 Aspiration pneumonia  - Id has been following   - Daily CXR   -aggressive pulm suctioning and chest PT--aspiration precautions including hob elevation (feed with assistance)  -frequent turning/off loading and position change as per protocol with local wound/skin care  -respiratory status improving and remains stable on 4L o2 via nc  -pulmonary/critical care following    #Right foot (1st toe stump and 2nd distal phalanx) OM and multiple L foot deep tissue injuries (seen on xray and MRI)  - Patient was planned for surgical debridement of the soft tissue and bone ( Right Foot) today.   - Rescheduled on Thursday   - Wound care following   - Currently off antibiotics for better yield in bone biopsy ; if patient starts having signs/ symptoms of infection ; to restart antibiotics    #DVT-nonocclusive of L common femoral vein  - on enoxaparin 55 mg oral daily     #Seizure- abnormal VEEG  - on keprra 500 mg BID   -monitor neuro checks    #Chronic barlow for urinary retention    CHG ordered  DVT: heparin   guarded prognosis  FULL CODE    pending: f/u with podiatry plan for Right foot/metatarsal OM and need for bx/culture on thursday      58 year old female with a PMHx of Down syndrome, nonverbal at baseline, hypothyroidism, cerebral palsy, congenital pulmonary stenosis, Seizures, presents to the ED from nursing facility for syncope associated with tonic-clonic movement witnessed by aide. Initial vitals at nursing home showed bradycardia and hypoxia for about 10-15 minutes. No fevers, chill, cough, vomiting, diarrhea, difficulty breathing.    Pt initial was admitted to medical floor then on 10/17 was upgraded to ICU for respiratory distress and hypotension and now downgraded to CEU.    #Acute hypoxemic respiratory failure-2/2 Aspiration pneumonia  - Id has been following   - Daily CXR   -aggressive pulm suctioning and chest PT--aspiration precautions including hob elevation (feed with assistance)  -frequent turning/off loading and position change as per protocol with local wound/skin care  -respiratory status improving and remains stable on 4L o2 via nc  -pulmonary/critical care following    #Right foot (1st toe stump and 2nd distal phalanx) OM and multiple L foot deep tissue injuries (seen on xray and MRI)  - Patient was planned for surgical debridement of the soft tissue and bone ( Right Foot) today.   - Rescheduled on Thursday   - Wound care following   - Currently off antibiotics for better yield in bone biopsy ; if patient starts having signs/ symptoms of infection ; to restart antibiotics    #DVT-nonocclusive of L common femoral vein  - on enoxaparin 55 mg subcut q12 hours   #Seizure- abnormal VEEG  - on keprra 500 mg BID   -monitor neuro checks    #Chronic barlow for urinary retention    CHG ordered  DVT: heparin   guarded prognosis  FULL CODE    pending: f/u with podiatry plan for Right foot/metatarsal OM and need for bx/culture on thursday

## 2023-11-06 NOTE — ADVANCED PRACTICE NURSE CONSULT - ASSESSMENT
Assessment:  • Assessment	  History of Present Illness:   Patient is a 58 year old female with a PMHx of Down syndrome, nonverbal at baseline, hypothyroidism, cerebral palsy, congenital pulmonary stenosis, Seizures, presents to the ED from nursing facility for syncope associated with tonic-clonic movement witnessed by aide. Initial vitals at nursing home showed bradycardia and hypoxia for about 10-15 minutes. No fevers, chill, cough, vomiting, diarrhea, difficulty breathing.     Hospital course 9/29/2023 -> 10/13/2023 Patient was admitted  for hemoptysis and duodenal perforation requiring intubation and ICU admission for hypovolemic and septic shock requiring temporary pressor support, which resolved. Patient also had acute hypoxemic respiratory failure likely due to multifocal pneumonia. Patient was successfully extubated and completed a course of antibiotics per ID, further infectious workup was unremarkable. Repeat CTAP with contrast showed no contrast extravasation and previous seen foci of retroperitoneal gas no longer identified. The patient was evaluated by surgery and GI, and no acute intervention recommended. Patient with stable hemoglobin. Hospital course complicated by NELA likely prerenal which resolved and NSTEMI likely type II due to shock (Echo 9/30 EF 55 to 60%, Normal left systolic function Diastolic function could not be assessed) with subsequent downtrending trops (0.34 --> 0.11). Patient's hospital course ICU --> Stepdown --> General medical floor. Patient remains hemodynamically stable on general medical, tolerating pureed feeds, and has bowel movements. Patient was discharged on 10/13/2023     Allergies and Intolerances:        Allergies:  	No Known Allergies:     Home Medications:   * Patient Currently Takes Medications as of 14-Oct-2023 21:04 documented in Structured Notes  · 	midodrine 5 mg oral tablet: Last Dose Taken:  , 1 tab(s) orally every 8 hours  · 	Protonix 40 mg oral delayed release tablet: Last Dose Taken:  , 1 tab(s) orally 2 times a day Protonix 40 mg two times daily for 2 months then once daily thereafter  · 	Multiple Vitamins oral tablet: Last Dose Taken:  , 1 tab(s) orally once a day  · 	melatonin 5 mg oral tablet: Last Dose Taken:  , 1 tab(s) orally once a day (at bedtime)  · 	gabapentin 600 mg oral tablet: Last Dose Taken:  , 1 orally once a day  · 	raloxifene 60 mg oral tablet: Last Dose Taken:  , 1 tab(s) orally once a day    Patient History:    Past Medical, Past Surgical, and Family History:  PAST MEDICAL HISTORY:  Down syndrome   Mild anemia   Neuropathy   Osteoporosis.    PAST SURGICAL HISTORY:  S/P debridement of R hip on 3/2/21.     Social History:  · Substance use	No  · Social History (marital status, living situation, occupation, and sexual history)	Lives at nursing home    Referred to picture in chart. Intubated and sedated. Limited mobility. Madsen in place. On pressors. High risk for pressure injury.    Wound #1  Type of Wound: Friction and Moisture Associated Skin Damage   Location: Bilateral buttocks    Follow up for Wound #1 on 11/6/23 – This wound is now healed and skin is intact.    Wound #2  Multiple deep tissue injuries to left foot. Blood filled blisters.     Podiatry following for wound to right foot.

## 2023-11-06 NOTE — PROGRESS NOTE ADULT - SUBJECTIVE AND OBJECTIVE BOX
24H events:    Patient is a 58y old Female who presents with a chief complaint of Pre-syncope (05 Nov 2023 21:00)    Primary diagnosis of Pre-syncope    Today is hospital day 23d. This morning patient was seen and examined at bedside, resting comfortably in bed.    No acute or major events overnight.    Code Status: FULL        PAST MEDICAL & SURGICAL HISTORY  Down syndrome    Osteoporosis    Mild anemia    Neuropathy    S/P debridement  of R hip on 3/2/21      SOCIAL HISTORY:  Social History:  Lives at nursing home (14 Oct 2023 20:33)      ALLERGIES:  No Known Allergies    MEDICATIONS:  STANDING MEDICATIONS  acetaminophen     Tablet .. 650 milliGRAM(s) Oral every 12 hours  chlorhexidine 2% Cloths 1 Application(s) Topical <User Schedule>  enoxaparin Injectable 55 milliGRAM(s) SubCutaneous every 12 hours  gabapentin 600 milliGRAM(s) Oral daily  levETIRAcetam 500 milliGRAM(s) Oral two times a day  midodrine 10 milliGRAM(s) Oral every 8 hours  multivitamin 1 Tablet(s) Oral daily  pantoprazole    Tablet 40 milliGRAM(s) Oral two times a day  polyethylene glycol 3350 17 Gram(s) Oral daily    PRN MEDICATIONS  melatonin 5 milliGRAM(s) Oral at bedtime PRN    VITALS:   T(F): 96.5  HR: 68  BP: 101/54  RR: 18  SpO2: 95%    PHYSICAL EXAM:  GENERAL:   ( x ) Lying in bed , non verbal  (  ) obtunded     (  ) lethargic     (  ) somnolent    HEAD:   ( x ) Atraumatic     (  ) hematoma     (  ) laceration (specify location:       )     NECK:  ( x ) Supple     (  ) neck stiffness     (  ) nuchal rigidity     (  )  no JVD     (  ) JVD present ( -- cm)    HEART:  Rate -->     ( x ) normal rate     (  ) bradycardic     (  ) tachycardic  Rhythm -->     (x  ) regular     (  ) regularly irregular     (  ) irregularly irregular  Murmurs -->     ( x ) normal s1s2     (  ) systolic murmur     (  ) diastolic murmur     (  ) continuous murmur      (  ) S3 present     (  ) S4 present    LUNGS: On 2l of NC, saturating well   ( x )Unlabored respirations     (  ) tachypnea  (x  ) B/L air entry     (  ) decreased breath sounds in:  (location     )    (  ) no adventitious sound     (  ) crackles     ( x ) wheezing      (  ) rhonchi      (specify location:       )  (  ) chest wall tenderness (specify location:       )    ABDOMEN:   ( x ) Soft     (  ) tense   |   ( x ) nondistended     (  ) distended   |   ( x ) +BS     (  ) hypoactive bowel sounds     (  ) hyperactive bowel sounds  (  ) nontender     (  ) RUQ tenderness     (  ) RLQ tenderness     (  ) LLQ tenderness     (  ) epigastric tenderness     (  ) diffuse tenderness  (  ) Splenomegaly      (  ) Hepatomegaly      (  ) Jaundice     (  ) ecchymosis     EXTREMITIES:  (  ) Normal     (  ) Rash     (  ) ecchymosis     (  ) varicose veins      (  ) pitting edema     (  ) non-pitting edema   ( x ) ulceration     (  ) gangrene:     (location: Bilateral foot; could not be examined as the ulcers were covered with bandages     )    NERVOUS SYSTEM:    ( x ) Non verbal (  ) confused     (  ) lethargic  CN II-XII:     (  ) Intact     (  ) deficits found     (Specify:     )   Upper extremities:     (  ) no sensorimotor deficits     (  ) weakness     (  ) loss of proprioception/vibration     (  ) loss of touch/temperature (specify:    )  Lower extremities:     (  ) no sensorimotor deficits     (  ) weakness     (  ) loss of proprioception/vibration     (  ) loss of touch/temperature (specify:    )        ( x ) Indwelling Madsen Catheter:  Chronic Madsen's   Reason ( x ) Critical illness     (  ) urinary retention    (  ) Accurate Ins/Outs Monitoring     (  ) CMO patient      LABS:                        11.8   6.41  )-----------( 296      ( 06 Nov 2023 07:35 )             38.1     11-06    140  |  103  |  12  ----------------------------<  107<H>  4.4   |  28  |  0.8    Ca    8.7      06 Nov 2023 07:35  Mg     2.1     11-06    TPro  6.7  /  Alb  3.1<L>  /  TBili  0.2  /  DBili  x   /  AST  31  /  ALT  41  /  AlkPhos  111  11-06    PT/INR - ( 06 Nov 2023 01:05 )   PT: 11.70 sec;   INR: 1.03 ratio         PTT - ( 06 Nov 2023 01:05 )  PTT:33.1 sec  Urinalysis Basic - ( 06 Nov 2023 07:35 )    Color: x / Appearance: x / SG: x / pH: x  Gluc: 107 mg/dL / Ketone: x  / Bili: x / Urobili: x   Blood: x / Protein: x / Nitrite: x   Leuk Esterase: x / RBC: x / WBC x   Sq Epi: x / Non Sq Epi: x / Bacteria: x        RADIOLOGY:  < from: Xray Chest 1 View- PORTABLE-Routine (Xray Chest 1 View- PORTABLE-Routine in AM.) (11.02.23 @ 06:21) >    FINDINGS/  IMPRESSION:    Slightly decreased left basal opacity. No pneumothorax.    Stable cardiomediastinal silhouette.    Unchanged osseous structures.    < end of copied text >  < from: [facility] Duplex Lower Ext Vein Scan, Bilat (11.01.23 @ 16:59) >  FINDINGS:    RIGHT:  Normal compressibility of the RIGHT common femoral, femoral and popliteal   veins.  Doppler examination shows normal spontaneous and phasic flow.  No RIGHT calf vein thrombosis is detected. Right peroneal vein not   visualized.    LEFT:  Nonocclusive DVT seen in the left common femoral vein.  Normal compressibility of the LEFT femoral and popliteal veins.  Doppler examination shows normal spontaneous and phasic flow.  No LEFT calf vein thrombosis is detected.    IMPRESSION:  DVT seen in the left common femoral vein.    Right lower extremity negative for DVT.        --- End of Report ---    < end of copied text >  < from: Xray Chest 1 View- PORTABLE-Routine (Xray Chest 1 View- PORTABLE-Routine in AM.) (11.01.23 @ 06:03) >  Impression:    Bilateral opacifications, stable.    < end of copied text >  < from: CT Angio Chest PE Protocol w/ IV Cont (10.18.23 @ 10:39) >    Impression:    No CT evidence of pulmonary embolism.    Bilateral pneumonia.    Support devices as described.    --- End of Report ---    < end of copied text >

## 2023-11-06 NOTE — PROGRESS NOTE ADULT - ASSESSMENT
58 year old woman with a PMHx of Down syndrome, nonverbal, WC bound and gerardo lift at baseline, hypothyroidism, cerebral palsy, congenital pulmonary stenosis, Seizures, presents to the ED from nursing facility for syncope associated with tonic-clonic movement witnessed by aide. Initial vitals at nursing home showed bradycardia and hypoxia for about 10-15 minutes. No fevers, chill, cough, vomiting, diarrhea, difficulty breathing.      Pt initial was admitted to medical floor, then on 10/17 was upgraded to ICU for respiratory distress and hypotension and now downgraded to CEU.    # Acute hypoxemic respiratory failure-2/2 Aspiration pneumonia  ID noted  abx completed  fungitell + -> completed tx w/ caspofungin   Daily CXR NOT NEEDED NOW  DAILY LABS NOT NEEDED NOW (labs are nl)  aspiration precautions including hob elevation (feed with assistance)  c/w NC O2 to keep sat > 92%  f/u pulm  c/w midodrine 10 q8 - can lower as BP improves (hold mido dose if BP > 140/90)  c/w MVI q24  bowel reg: miralax    # Right foot OM (1st toe amp stump and 2nd toe distal phalanx); hx multiple Lt foot deep tissue injuries (seen on xray and MRI)  planned for surgical debridement of the soft tissue and bone for Thurs 11/9  f/u Pod  Wound care per Pod  f/u ID  Currently off antibiotics for better yield in bone biopsy  if patient starts having signs/ symptoms of infection -> restart broad spectrum antibiotics  ESR 10/15/23: 67  CRP 10/15/23: 16.1    # Pre-Op eval  LRCRI: 0, but sicker than that  Clinical Risks: Down's pt w/ adv dementia and CP - non-verbal; asp risk; recent PNA w/ AHRF req intubation - now extubated; active rt foot bone infections; recent HoTN on midodrine; rolando pulm stenosis; hypothyroid; WC bound; non-mobile; hx Sz; acute DVT on a/c  Surgical Risk: intermed to low  Functional Status: poor (basically bedbound)    ECG: NSR; no isch change  Echo: (9/2023): nl EF; nl RV; mild MR  CXR: slight decr lt basal opac    Overall, the risk for this pt is intermed. May proceed to OR without any further cardiac work up.     NPO p MN day before OR. IVFs in am of OR. Hold DVT ppx in am of procedure.     # acute DVT Lt common femoral vein  on enoxaparin 55 mg sc q12    # Seizure  abnormal VEEG  on keprra 500 mg BID   c/w neurontin 600mg q24  outpt neuro f/u    # Chronic barlow for urinary retention    # DVT ppx: on therap LMWH - hold night dose on Wed 11/8    # GI ppx: ppi po q12    # FULL CODE    Dispo: f/u with pod re sx Th; hold abx - f/u ID; NPO p MN Wed; IVFs AM TH; HOLD a/c Wed night; limit CXR and labs; c/w NC O2; c/w barlow  eventually, pt will go back to [facility] - f/u CM - still acute

## 2023-11-06 NOTE — ADVANCED PRACTICE NURSE CONSULT - RECOMMEDATIONS
• Recommendations	  Cleanse bilateral buttocks with soap and water.   Pat dry apply moisture barrier cream and cover with Allevyn twice a day and prn for soiling for prevention.    Cleanse wounds to left foot with soap and water  Pat dry, apply Cavilon and cover with Allevyn twice a day, prn for soiling  Recommend follow up with Podiatry for further recommendations    Maintain pressure injury prevention.   Keep skin clean.   Maintain incontinence care.   Monitor wound for changes and notify provider   Case discussed with primary RN

## 2023-11-06 NOTE — CHART NOTE - NSCHARTNOTEFT_GEN_A_CORE
Surgery cancelled today: No medical clearance available in patient chart  Rescheduled Thursday, 11/9/23  Please obtain medical clearance, optimize lab values prior to OR    Thank You  Podiatry#[id number]

## 2023-11-07 PROCEDURE — 99232 SBSQ HOSP IP/OBS MODERATE 35: CPT

## 2023-11-07 RX ORDER — SODIUM CHLORIDE 9 MG/ML
1000 INJECTION INTRAMUSCULAR; INTRAVENOUS; SUBCUTANEOUS
Refills: 0 | Status: DISCONTINUED | OUTPATIENT
Start: 2023-11-07 | End: 2023-11-08

## 2023-11-07 RX ORDER — PANTOPRAZOLE SODIUM 20 MG/1
40 TABLET, DELAYED RELEASE ORAL
Refills: 0 | Status: DISCONTINUED | OUTPATIENT
Start: 2023-11-07 | End: 2023-11-10

## 2023-11-07 RX ADMIN — Medication 650 MILLIGRAM(S): at 17:29

## 2023-11-07 RX ADMIN — GABAPENTIN 600 MILLIGRAM(S): 400 CAPSULE ORAL at 11:35

## 2023-11-07 RX ADMIN — Medication 650 MILLIGRAM(S): at 18:00

## 2023-11-07 RX ADMIN — PANTOPRAZOLE SODIUM 40 MILLIGRAM(S): 20 TABLET, DELAYED RELEASE ORAL at 17:41

## 2023-11-07 RX ADMIN — CHLORHEXIDINE GLUCONATE 1 APPLICATION(S): 213 SOLUTION TOPICAL at 06:20

## 2023-11-07 RX ADMIN — ENOXAPARIN SODIUM 55 MILLIGRAM(S): 100 INJECTION SUBCUTANEOUS at 17:29

## 2023-11-07 RX ADMIN — PANTOPRAZOLE SODIUM 40 MILLIGRAM(S): 20 TABLET, DELAYED RELEASE ORAL at 06:20

## 2023-11-07 RX ADMIN — MIDODRINE HYDROCHLORIDE 10 MILLIGRAM(S): 2.5 TABLET ORAL at 19:04

## 2023-11-07 RX ADMIN — MIDODRINE HYDROCHLORIDE 10 MILLIGRAM(S): 2.5 TABLET ORAL at 23:43

## 2023-11-07 RX ADMIN — Medication 650 MILLIGRAM(S): at 06:50

## 2023-11-07 RX ADMIN — ENOXAPARIN SODIUM 55 MILLIGRAM(S): 100 INJECTION SUBCUTANEOUS at 06:20

## 2023-11-07 RX ADMIN — POLYETHYLENE GLYCOL 3350 17 GRAM(S): 17 POWDER, FOR SOLUTION ORAL at 11:35

## 2023-11-07 RX ADMIN — LEVETIRACETAM 500 MILLIGRAM(S): 250 TABLET, FILM COATED ORAL at 17:29

## 2023-11-07 RX ADMIN — SODIUM CHLORIDE 200 MILLILITER(S): 9 INJECTION INTRAMUSCULAR; INTRAVENOUS; SUBCUTANEOUS at 20:00

## 2023-11-07 RX ADMIN — Medication 1 TABLET(S): at 11:35

## 2023-11-07 RX ADMIN — MIDODRINE HYDROCHLORIDE 10 MILLIGRAM(S): 2.5 TABLET ORAL at 06:20

## 2023-11-07 RX ADMIN — Medication 650 MILLIGRAM(S): at 06:19

## 2023-11-07 RX ADMIN — LEVETIRACETAM 500 MILLIGRAM(S): 250 TABLET, FILM COATED ORAL at 06:19

## 2023-11-07 NOTE — PROGRESS NOTE ADULT - ASSESSMENT
58 year old woman with a PMHx of Down syndrome, nonverbal, WC bound and gerardo lift at baseline, hypothyroidism, cerebral palsy, congenital pulmonary stenosis, Seizures, presents to the ED from nursing facility for syncope associated with tonic-clonic movement witnessed by aide. Initial vitals at nursing home showed bradycardia and hypoxia for about 10-15 minutes. No fevers, chill, cough, vomiting, diarrhea, difficulty breathing.      Pt initial was admitted to medical floor, then on 10/17 was upgraded to ICU for respiratory distress and hypotension and now downgraded to CEU.    # Limit Labs    # sacral ulcer has healed  cont to turn/position; off load  sacral care per RN team    # Acute hypoxemic respiratory failure-2/2 Aspiration pneumonia  ID noted  abx completed  fungitell + -> completed tx w/ caspofungin   Daily CXR NOT NEEDED  DAILY LABS NOT NEEDED (labs are nl)  aspiration precautions including hob elevation (feed with assistance)  tapering O2 (hopefully off soon)  f/u pulm  c/w midodrine 10 q8 - can lower as BP improves (hold mido dose if BP > 140/90)  c/w MVI q24  bowel reg: miralax    # Right foot OM (1st toe amp stump and 2nd toe distal phalanx); hx multiple Lt foot deep tissue injuries (seen on xray and MRI)  planned for surgical debridement of the soft tissue and bone for Thurs 11/9  f/u Pod  Wound care per Pod  f/u ID  Currently off antibiotics for better yield in bone biopsy  if patient starts having signs/ symptoms of infection -> restart broad spectrum antibiotics  ESR 10/15/23: 67  CRP 10/15/23: 16.1    # Pre-Op eval  LRCRI: 0, but sicker than that  Clinical Risks: Down's pt w/ adv dementia and CP - non-verbal; asp risk; recent PNA w/ AHRF req intubation - now extubated; active rt foot bone infections; recent HoTN on midodrine; rolando pulm stenosis; hypothyroid; WC bound; non-mobile; hx Sz; acute DVT on a/c  Surgical Risk: intermed to low  Functional Status: poor (basically bedbound)    ECG: NSR; no isch change  Echo: (9/2023): nl EF; nl RV; mild MR  CXR: slight decr lt basal opac    Overall, the risk for this pt is intermed. May proceed to OR without any further cardiac work up.     NPO p MN day before OR. IVFs in am of OR. Hold DVT ppx in am of procedure.     # acute DVT Lt common femoral vein  on enoxaparin 55 mg sc q12    # Seizure  abnormal VEEG  on keprra 500 mg BID   c/w neurontin 600mg q24  outpt neuro f/u    # Chronic barlow for urinary retention    # DVT ppx: on therap LMWH - hold night dose on Wed 11/8    # GI ppx: ppi po q12    # FULL CODE    Dispo: f/u with pod re sx Th; hold abx - f/u ID; NPO p MN Wed; IVFs AM TH; HOLD a/c Wed night; limit CXR and labs; c/w NC O2; c/w barlow  eventually, pt will go back to [facility] - f/u CM - still acute

## 2023-11-07 NOTE — PROGRESS NOTE ADULT - ASSESSMENT
58 year old female with a PMHx of Down syndrome, nonverbal at baseline, hypothyroidism, cerebral palsy, Seizures, presents to the ED from nursing facility for syncope associated with tonic-clonic movement witnessed by aide. Initial vitals at nursing home showed bradycardia and hypoxia for about 10-15 minutes. No fevers, chill, cough, vomiting, diarrhea, difficulty breathing.    Pt initial was admitted to medical floor then on 10/17 was upgraded to ICU for respiratory distress and hypotension and now downgraded to CEU.    #Acute hypoxemic respiratory failure-2/2 Aspiration pneumonia  - Id has been following   - Daily CXR   -aggressive pulm suctioning and chest PT--aspiration precautions including hob elevation (feed with assistance)  -frequent turning/off loading and position change as per protocol with local wound/skin care  -respiratory status improving and remains stable on 4L o2 via nc  -pulmonary/critical care following    #Right foot (1st toe stump and 2nd distal phalanx) OM and multiple L foot deep tissue injuries (seen on xray and MRI)  - Patient was planned for surgical debridement of the soft tissue and bone ( Right Foot) today.   - Rescheduled on Thursday   - Wound care following   - Currently off antibiotics for better yield in bone biopsy ; if patient starts having signs/ symptoms of infection ; to restart antibiotics    #DVT-nonocclusive of L common femoral vein  - on enoxaparin 55 mg subcut q12 hours   #Seizure- abnormal VEEG  - on keprra 500 mg BID   -monitor neuro checks    #Chronic barlow for urinary retention    CHG ordered  DVT: heparin   guarded prognosis  FULL CODE    pending: f/u with podiatry plan for Right foot/metatarsal OM and need for bx/culture on thursday  58 year old female with a PMHx of Down syndrome, nonverbal at baseline, hypothyroidism, cerebral palsy, Seizures, presents to the ED from nursing facility for syncope associated with tonic-clonic movement witnessed by aide. Initial vitals at nursing home showed bradycardia and hypoxia for about 10-15 minutes. No fevers, chill, cough, vomiting, diarrhea, difficulty breathing.    Pt initial was admitted to medical floor then on 10/17 was upgraded to ICU for respiratory distress and hypotension and now downgraded to CEU.    #Acute hypoxemic respiratory failure-2/2 Aspiration pneumonia  -aggressive pulm suctioning and chest PT--aspiration precautions including hob elevation (feed with assistance)  -frequent turning/off loading and position change as per protocol with local wound/skin care  -respiratory status improving  - tried on weaning off on the oxygen ; patient has been stable   - S/p meropenem and caspofungin     #Right foot (1st toe stump and 2nd distal phalanx) OM and multiple L foot deep tissue injuries (seen on xray and MRI)  - Patient was planned for surgical debridement of the soft tissue and bone ( Right Foot) today.   - Rescheduled on Thursday   - Wound care following   - Currently off antibiotics for better yield in bone biopsy ; if patient starts having signs/ symptoms of infection ; to restart antibiotics    #DVT-nonocclusive of L common femoral vein  - on enoxaparin 55 mg subcut q12 hours     #Seizure- abnormal VEEG  - on keprra 500 mg BID   -monitor neuro checks    #Chronic barlow for urinary retention;  - Barlow changed in the AM due to leak   - change of barlow date 11/07    CHG ordered  DVT: lovenox   guarded prognosis  FULL CODE    pending: f/u with podiatry plan for Right foot/metatarsal OM and need for bx/culture on Thursday    hold lovenox from wednesday night for the surgery     Dispo: Group Home ([facility]) eventually

## 2023-11-07 NOTE — PROGRESS NOTE ADULT - SUBJECTIVE AND OBJECTIVE BOX
JERICHO TEA  58y  Female  ***My note supersedes ALL resident notes that I sign.  My corrections for their notes are in my note.***    I can be reached directly on [employer] [id number]. My office number is [phone]. My personal cell number is [phone].    INTERVAL EVENTS: Here for f/u of rt foot OM. Pt stable. Non-verbal. Seems comfortable. Eats w/ aide asst.    T(F): 98.6 (11-07-23 @ 13:30), Max: 98.6 (11-07-23 @ 13:30)  HR: 120 (11-07-23 @ 13:30) (63 - 120)  BP: 149/69 (11-07-23 @ 13:30) (100/65 - 149/69)  RR: 18 (11-07-23 @ 13:30) (18 - 18)  SpO2: 98% (11-07-23 @ 08:30) (98% - 98%)    11-06-23 @ 07:01  -  11-07-23 @ 07:00  --------------------------------------------------------  IN: 0 mL / OUT: 200 mL / NET: -200 mL    Gen: NAD; + Down's facies; + NC O2  HEENT: PERRL, nose clr  Neck: no nodes, no JVD, thyroid nl  lungs: clr  hrt: s1 s2 rrr no murmur  abd: soft, NT/ND, no HS megaly  : + barlow w/ yel urine  ext: no edema, no c/c  rt foot: s/p 1st toe amp  neuro: aa ox3, cn intact, can move all 4 ext    LABS:                      11.8    (    80.9   6.41  )-----------( ---------      296      ( 06 Nov 2023 07:35 )             38.1    (    28.2     PT/INR - ( 06 Nov 2023 01:05 )   PT: 11.70 sec;   INR: 1.03 ratio    PTT - ( 06 Nov 2023 01:05 )  PTT: 33.1 sec    Urinalysis Basic - ( 06 Nov 2023 07:35 )    Color: x / Appearance: x / SG: x / pH: x  Gluc: 107 mg/dL / Ketone: x  / Bili: x / Urobili: x   Blood: x / Protein: x / Nitrite: x   Leuk Esterase: x / RBC: x / WBC x   Sq Epi: x / Non Sq Epi: x / Bacteria: x    CAPILLARY BLOOD GLUCOSE  POCT Blood Glucose.: 110 (11-04-23 @ 23:34)    RADIOLOGY & ADDITIONAL TESTS:    MEDICATIONS:    acetaminophen     Tablet .. 650 milliGRAM(s) Oral every 12 hours  chlorhexidine 2% Cloths 1 Application(s) Topical <User Schedule>  enoxaparin Injectable 55 milliGRAM(s) SubCutaneous every 12 hours  gabapentin 600 milliGRAM(s) Oral daily  levETIRAcetam 500 milliGRAM(s) Oral two times a day  melatonin 5 milliGRAM(s) Oral at bedtime PRN  midodrine 10 milliGRAM(s) Oral every 8 hours  multivitamin 1 Tablet(s) Oral daily  pantoprazole   Suspension 40 milliGRAM(s) Oral two times a day  polyethylene glycol 3350 17 Gram(s) Oral daily

## 2023-11-07 NOTE — PROGRESS NOTE ADULT - SUBJECTIVE AND OBJECTIVE BOX
24H events:    Patient is a 58y old Female who presents with a chief complaint of Pre-syncope (06 Nov 2023 19:53)    Primary diagnosis of Pre-syncope    Today is hospital day 24d. This morning patient was seen and examined at bedside, resting comfortably in bed.    No acute or major events overnight.    Code Status: full         PAST MEDICAL & SURGICAL HISTORY  Down syndrome    Osteoporosis    Mild anemia    Neuropathy    S/P debridement  of R hip on 3/2/21      SOCIAL HISTORY:  Social History:  Lives at nursing home (14 Oct 2023 20:33)      ALLERGIES:  No Known Allergies    MEDICATIONS:  STANDING MEDICATIONS  acetaminophen     Tablet .. 650 milliGRAM(s) Oral every 12 hours  chlorhexidine 2% Cloths 1 Application(s) Topical <User Schedule>  enoxaparin Injectable 55 milliGRAM(s) SubCutaneous every 12 hours  gabapentin 600 milliGRAM(s) Oral daily  levETIRAcetam 500 milliGRAM(s) Oral two times a day  midodrine 10 milliGRAM(s) Oral every 8 hours  multivitamin 1 Tablet(s) Oral daily  pantoprazole    Tablet 40 milliGRAM(s) Oral two times a day  polyethylene glycol 3350 17 Gram(s) Oral daily    PRN MEDICATIONS  melatonin 5 milliGRAM(s) Oral at bedtime PRN    VITALS:   T(F): 98.6  HR: 120  BP: 149/69  RR: 18  SpO2: 98%    PHYSICAL EXAM:  GENERAL:   ( x ) Lying in bed , non verbal  (  ) obtunded     (  ) lethargic     (  ) somnolent    HEAD:   ( x ) Atraumatic     (  ) hematoma     (  ) laceration (specify location:       )     NECK:  ( x ) Supple     (  ) neck stiffness     (  ) nuchal rigidity     (  )  no JVD     (  ) JVD present ( -- cm)    HEART:  Rate -->     ( x ) normal rate     (  ) bradycardic     (  ) tachycardic  Rhythm -->     (x  ) regular     (  ) regularly irregular     (  ) irregularly irregular  Murmurs -->     ( x ) normal s1s2     (  ) systolic murmur     (  ) diastolic murmur     (  ) continuous murmur      (  ) S3 present     (  ) S4 present    LUNGS: On 2l of NC, saturating well ; trying to wean her off oxygen   ( x )Unlabored respirations     (  ) tachypnea  (x  ) B/L air entry     (  ) decreased breath sounds in:  (location     )    (  ) no adventitious sound     (  ) crackles     ( x ) wheezing      (  ) rhonchi      (specify location:       )  (  ) chest wall tenderness (specify location:       )    ABDOMEN:   ( x ) Soft     (  ) tense   |   ( x ) nondistended     (  ) distended   |   ( x ) +BS     (  ) hypoactive bowel sounds     (  ) hyperactive bowel sounds  (  ) nontender     (  ) RUQ tenderness     (  ) RLQ tenderness     (  ) LLQ tenderness     (  ) epigastric tenderness     (  ) diffuse tenderness  (  ) Splenomegaly      (  ) Hepatomegaly      (  ) Jaundice     (  ) ecchymosis     EXTREMITIES:  (  ) Normal     (  ) Rash     (  ) ecchymosis     (  ) varicose veins      (  ) pitting edema     (  ) non-pitting edema   ( x ) ulceration     (  ) gangrene:     (location: Bilateral foot; could not be examined as the ulcers were covered with bandages but patient has 1st toe amp stump osteo and 2nd toe distal phalanx     )    NERVOUS SYSTEM:    ( x ) Non verbal (  ) confused     (  ) lethargic  CN II-XII:     (  ) Intact     (  ) deficits found     (Specify:     )   Upper extremities:     (  ) no sensorimotor deficits     (  ) weakness     (  ) loss of proprioception/vibration     (  ) loss of touch/temperature (specify:    )  Lower extremities:     (  ) no sensorimotor deficits     (  ) weakness     (  ) loss of proprioception/vibration     (  ) loss of touch/temperature (specify:    )        ( x ) Indwelling Madsen Catheter:  Chronic Madsen's changed on 11/07/2023  Reason ( x ) Critical illness     ( x) urinary retention    (  ) Accurate Ins/Outs Monitoring     (  ) CMO patient    LABS:                        11.8   6.41  )-----------( 296      ( 06 Nov 2023 07:35 )             38.1     11-06    140  |  103  |  12  ----------------------------<  107<H>  4.4   |  28  |  0.8    Ca    8.7      06 Nov 2023 07:35  Mg     2.1     11-06    TPro  6.7  /  Alb  3.1<L>  /  TBili  0.2  /  DBili  x   /  AST  31  /  ALT  41  /  AlkPhos  111  11-06    PT/INR - ( 06 Nov 2023 01:05 )   PT: 11.70 sec;   INR: 1.03 ratio         PTT - ( 06 Nov 2023 01:05 )  PTT:33.1 sec  Urinalysis Basic - ( 06 Nov 2023 07:35 )    Color: x / Appearance: x / SG: x / pH: x  Gluc: 107 mg/dL / Ketone: x  / Bili: x / Urobili: x   Blood: x / Protein: x / Nitrite: x   Leuk Esterase: x / RBC: x / WBC x   Sq Epi: x / Non Sq Epi: x / Bacteria: x                RADIOLOGY:           24H events:    Patient is a 58y old Female who presents with a chief complaint of Pre-syncope (06 Nov 2023 19:53)    Primary diagnosis of Pre-syncope    Today is hospital day 24d. This morning patient was seen and examined at bedside, resting comfortably in bed.    No acute or major events overnight.    Code Status: full         PAST MEDICAL & SURGICAL HISTORY  Down syndrome    Osteoporosis    Mild anemia    Neuropathy    S/P debridement  of R hip on 3/2/21      SOCIAL HISTORY:  Social History:  Lives at nursing home (14 Oct 2023 20:33)      ALLERGIES:  No Known Allergies    MEDICATIONS:  STANDING MEDICATIONS  acetaminophen     Tablet .. 650 milliGRAM(s) Oral every 12 hours  chlorhexidine 2% Cloths 1 Application(s) Topical <User Schedule>  enoxaparin Injectable 55 milliGRAM(s) SubCutaneous every 12 hours  gabapentin 600 milliGRAM(s) Oral daily  levETIRAcetam 500 milliGRAM(s) Oral two times a day  midodrine 10 milliGRAM(s) Oral every 8 hours  multivitamin 1 Tablet(s) Oral daily  pantoprazole    Tablet 40 milliGRAM(s) Oral two times a day  polyethylene glycol 3350 17 Gram(s) Oral daily    PRN MEDICATIONS  melatonin 5 milliGRAM(s) Oral at bedtime PRN    VITALS:   T(F): 98.6  HR: 120  BP: 149/69  RR: 18  SpO2: 98%    PHYSICAL EXAM:  GENERAL:   ( x ) Lying in bed , non verbal  (  ) obtunded     (  ) lethargic     (  ) somnolent    HEAD:   ( x ) Atraumatic     (  ) hematoma     (  ) laceration (specify location:       )     NECK:  ( x ) Supple     (  ) neck stiffness     (  ) nuchal rigidity     (  )  no JVD     (  ) JVD present ( -- cm)    HEART:  Rate -->     ( x ) normal rate     (  ) bradycardic     (  ) tachycardic  Rhythm -->     (x  ) regular     (  ) regularly irregular     (  ) irregularly irregular  Murmurs -->     ( x ) normal s1s2     (  ) systolic murmur     (  ) diastolic murmur     (  ) continuous murmur      (  ) S3 present     (  ) S4 present    LUNGS: On 2l of NC, saturating well ; trying to wean her off oxygen   ( x )Unlabored respirations     (  ) tachypnea  (x  ) B/L air entry     (  ) decreased breath sounds in:  (location     )    (  ) no adventitious sound     (  ) crackles     ( x ) wheezing      (  ) rhonchi      (specify location:       )  (  ) chest wall tenderness (specify location:       )    ABDOMEN:   ( x ) Soft     (  ) tense   |   ( x ) nondistended     (  ) distended   |   ( x ) +BS     (  ) hypoactive bowel sounds     (  ) hyperactive bowel sounds  (  ) nontender     (  ) RUQ tenderness     (  ) RLQ tenderness     (  ) LLQ tenderness     (  ) epigastric tenderness     (  ) diffuse tenderness  (  ) Splenomegaly      (  ) Hepatomegaly      (  ) Jaundice     (  ) ecchymosis     EXTREMITIES:  (  ) Normal     (  ) Rash     (  ) ecchymosis     (  ) varicose veins      (  ) pitting edema     (  ) non-pitting edema   ( x ) ulceration     (  ) gangrene:     (location: Bilateral foot; could not be examined as the ulcers were covered with bandages but patient has 1st toe amp stump osteo and 2nd toe distal phalanx     )    NERVOUS SYSTEM:    ( x ) Non verbal (  ) confused     (  ) lethargic  CN II-XII:     (  ) Intact     (  ) deficits found     (Specify:     )   Upper extremities:     (  ) no sensorimotor deficits     (  ) weakness     (  ) loss of proprioception/vibration     (  ) loss of touch/temperature (specify:    )  Lower extremities:     (  ) no sensorimotor deficits     (  ) weakness     (  ) loss of proprioception/vibration     (  ) loss of touch/temperature (specify:    )        ( x ) Indwelling Madsen Catheter:  Chronic Madsen's changed on 11/07/2023  Reason ( x ) Critical illness     ( x) urinary retention    (  ) Accurate Ins/Outs Monitoring     (  ) CMO patient    LABS:                        11.8   6.41  )-----------( 296      ( 06 Nov 2023 07:35 )             38.1     11-06    140  |  103  |  12  ----------------------------<  107<H>  4.4   |  28  |  0.8    Ca    8.7      06 Nov 2023 07:35  Mg     2.1     11-06    TPro  6.7  /  Alb  3.1<L>  /  TBili  0.2  /  DBili  x   /  AST  31  /  ALT  41  /  AlkPhos  111  11-06    PT/INR - ( 06 Nov 2023 01:05 )   PT: 11.70 sec;   INR: 1.03 ratio         PTT - ( 06 Nov 2023 01:05 )  PTT:33.1 sec  Urinalysis Basic - ( 06 Nov 2023 07:35 )    Color: x / Appearance: x / SG: x / pH: x  Gluc: 107 mg/dL / Ketone: x  / Bili: x / Urobili: x   Blood: x / Protein: x / Nitrite: x   Leuk Esterase: x / RBC: x / WBC x   Sq Epi: x / Non Sq Epi: x / Bacteria: x                RADIOLOGY:   from: Xray Chest 1 View- PORTABLE-Routine (Xray Chest 1 View- PORTABLE-Routine in AM.) (11.02.23 @ 06:21) >    FINDINGS/  IMPRESSION:    Slightly decreased left basal opacity. No pneumothorax.    Stable cardiomediastinal silhouette.    Unchanged osseous structures.    < end of copied text >  < from: [facility] Duplex Lower Ext Vein Scan, Bilat (11.01.23 @ 16:59) >  FINDINGS:    RIGHT:  Normal compressibility of the RIGHT common femoral, femoral and popliteal   veins.  Doppler examination shows normal spontaneous and phasic flow.  No RIGHT calf vein thrombosis is detected. Right peroneal vein not   visualized.    LEFT:  Nonocclusive DVT seen in the left common femoral vein.  Normal compressibility of the LEFT femoral and popliteal veins.  Doppler examination shows normal spontaneous and phasic flow.  No LEFT calf vein thrombosis is detected.    IMPRESSION:  DVT seen in the left common femoral vein.    Right lower extremity negative for DVT.        --- End of Report ---    < end of copied text >  < from: Xray Chest 1 View- PORTABLE-Routine (Xray Chest 1 View- PORTABLE-Routine in AM.) (11.01.23 @ 06:03) >  Impression:    Bilateral opacifications, stable.    < end of copied text >  < from: CT Angio Chest PE Protocol w/ IV Cont (10.18.23 @ 10:39) >    Impression:    No CT evidence of pulmonary embolism.    Bilateral pneumonia.    Support devices as described.    --- End of Report ---    < end of copied text >

## 2023-11-08 ENCOUNTER — APPOINTMENT (OUTPATIENT)
Dept: GASTROENTEROLOGY | Facility: CLINIC | Age: 58
End: 2023-11-08

## 2023-11-08 LAB
ALBUMIN SERPL ELPH-MCNC: 2.9 G/DL — LOW (ref 3.5–5.2)
ALBUMIN SERPL ELPH-MCNC: 2.9 G/DL — LOW (ref 3.5–5.2)
ALP SERPL-CCNC: 114 U/L — SIGNIFICANT CHANGE UP (ref 30–115)
ALP SERPL-CCNC: 114 U/L — SIGNIFICANT CHANGE UP (ref 30–115)
ALT FLD-CCNC: 36 U/L — SIGNIFICANT CHANGE UP (ref 0–41)
ALT FLD-CCNC: 36 U/L — SIGNIFICANT CHANGE UP (ref 0–41)
ANION GAP SERPL CALC-SCNC: 11 MMOL/L — SIGNIFICANT CHANGE UP (ref 7–14)
ANION GAP SERPL CALC-SCNC: 11 MMOL/L — SIGNIFICANT CHANGE UP (ref 7–14)
APTT BLD: 30.1 SEC — SIGNIFICANT CHANGE UP (ref 27–39.2)
APTT BLD: 30.1 SEC — SIGNIFICANT CHANGE UP (ref 27–39.2)
AST SERPL-CCNC: 29 U/L — SIGNIFICANT CHANGE UP (ref 0–41)
AST SERPL-CCNC: 29 U/L — SIGNIFICANT CHANGE UP (ref 0–41)
BASOPHILS # BLD AUTO: 0.05 K/UL — SIGNIFICANT CHANGE UP (ref 0–0.2)
BASOPHILS # BLD AUTO: 0.05 K/UL — SIGNIFICANT CHANGE UP (ref 0–0.2)
BASOPHILS NFR BLD AUTO: 0.9 % — SIGNIFICANT CHANGE UP (ref 0–1)
BASOPHILS NFR BLD AUTO: 0.9 % — SIGNIFICANT CHANGE UP (ref 0–1)
BILIRUB SERPL-MCNC: <0.2 MG/DL — SIGNIFICANT CHANGE UP (ref 0.2–1.2)
BILIRUB SERPL-MCNC: <0.2 MG/DL — SIGNIFICANT CHANGE UP (ref 0.2–1.2)
BUN SERPL-MCNC: 14 MG/DL — SIGNIFICANT CHANGE UP (ref 10–20)
BUN SERPL-MCNC: 14 MG/DL — SIGNIFICANT CHANGE UP (ref 10–20)
CALCIUM SERPL-MCNC: 8.2 MG/DL — LOW (ref 8.4–10.5)
CALCIUM SERPL-MCNC: 8.2 MG/DL — LOW (ref 8.4–10.5)
CHLORIDE SERPL-SCNC: 105 MMOL/L — SIGNIFICANT CHANGE UP (ref 98–110)
CHLORIDE SERPL-SCNC: 105 MMOL/L — SIGNIFICANT CHANGE UP (ref 98–110)
CO2 SERPL-SCNC: 24 MMOL/L — SIGNIFICANT CHANGE UP (ref 17–32)
CO2 SERPL-SCNC: 24 MMOL/L — SIGNIFICANT CHANGE UP (ref 17–32)
CREAT SERPL-MCNC: 0.9 MG/DL — SIGNIFICANT CHANGE UP (ref 0.7–1.5)
CREAT SERPL-MCNC: 0.9 MG/DL — SIGNIFICANT CHANGE UP (ref 0.7–1.5)
EGFR: 75 ML/MIN/1.73M2 — SIGNIFICANT CHANGE UP
EGFR: 75 ML/MIN/1.73M2 — SIGNIFICANT CHANGE UP
EOSINOPHIL # BLD AUTO: 0.34 K/UL — SIGNIFICANT CHANGE UP (ref 0–0.7)
EOSINOPHIL # BLD AUTO: 0.34 K/UL — SIGNIFICANT CHANGE UP (ref 0–0.7)
EOSINOPHIL NFR BLD AUTO: 6.1 % — SIGNIFICANT CHANGE UP (ref 0–8)
EOSINOPHIL NFR BLD AUTO: 6.1 % — SIGNIFICANT CHANGE UP (ref 0–8)
GLUCOSE SERPL-MCNC: 119 MG/DL — HIGH (ref 70–99)
GLUCOSE SERPL-MCNC: 119 MG/DL — HIGH (ref 70–99)
HCT VFR BLD CALC: 34.5 % — LOW (ref 37–47)
HCT VFR BLD CALC: 34.5 % — LOW (ref 37–47)
HGB BLD-MCNC: 10.7 G/DL — LOW (ref 12–16)
HGB BLD-MCNC: 10.7 G/DL — LOW (ref 12–16)
IMM GRANULOCYTES NFR BLD AUTO: 0.4 % — HIGH (ref 0.1–0.3)
IMM GRANULOCYTES NFR BLD AUTO: 0.4 % — HIGH (ref 0.1–0.3)
INR BLD: 1.03 RATIO — SIGNIFICANT CHANGE UP (ref 0.65–1.3)
INR BLD: 1.03 RATIO — SIGNIFICANT CHANGE UP (ref 0.65–1.3)
LYMPHOCYTES # BLD AUTO: 1.81 K/UL — SIGNIFICANT CHANGE UP (ref 1.2–3.4)
LYMPHOCYTES # BLD AUTO: 1.81 K/UL — SIGNIFICANT CHANGE UP (ref 1.2–3.4)
LYMPHOCYTES # BLD AUTO: 32.3 % — SIGNIFICANT CHANGE UP (ref 20.5–51.1)
LYMPHOCYTES # BLD AUTO: 32.3 % — SIGNIFICANT CHANGE UP (ref 20.5–51.1)
MCHC RBC-ENTMCNC: 24.7 PG — LOW (ref 27–31)
MCHC RBC-ENTMCNC: 24.7 PG — LOW (ref 27–31)
MCHC RBC-ENTMCNC: 31 G/DL — LOW (ref 32–37)
MCHC RBC-ENTMCNC: 31 G/DL — LOW (ref 32–37)
MCV RBC AUTO: 79.5 FL — LOW (ref 81–99)
MCV RBC AUTO: 79.5 FL — LOW (ref 81–99)
MONOCYTES # BLD AUTO: 0.4 K/UL — SIGNIFICANT CHANGE UP (ref 0.1–0.6)
MONOCYTES # BLD AUTO: 0.4 K/UL — SIGNIFICANT CHANGE UP (ref 0.1–0.6)
MONOCYTES NFR BLD AUTO: 7.1 % — SIGNIFICANT CHANGE UP (ref 1.7–9.3)
MONOCYTES NFR BLD AUTO: 7.1 % — SIGNIFICANT CHANGE UP (ref 1.7–9.3)
NEUTROPHILS # BLD AUTO: 2.98 K/UL — SIGNIFICANT CHANGE UP (ref 1.4–6.5)
NEUTROPHILS # BLD AUTO: 2.98 K/UL — SIGNIFICANT CHANGE UP (ref 1.4–6.5)
NEUTROPHILS NFR BLD AUTO: 53.2 % — SIGNIFICANT CHANGE UP (ref 42.2–75.2)
NEUTROPHILS NFR BLD AUTO: 53.2 % — SIGNIFICANT CHANGE UP (ref 42.2–75.2)
NRBC # BLD: 0 /100 WBCS — SIGNIFICANT CHANGE UP (ref 0–0)
NRBC # BLD: 0 /100 WBCS — SIGNIFICANT CHANGE UP (ref 0–0)
PLATELET # BLD AUTO: 238 K/UL — SIGNIFICANT CHANGE UP (ref 130–400)
PLATELET # BLD AUTO: 238 K/UL — SIGNIFICANT CHANGE UP (ref 130–400)
PMV BLD: 10.5 FL — HIGH (ref 7.4–10.4)
PMV BLD: 10.5 FL — HIGH (ref 7.4–10.4)
POTASSIUM SERPL-MCNC: 4.1 MMOL/L — SIGNIFICANT CHANGE UP (ref 3.5–5)
POTASSIUM SERPL-MCNC: 4.1 MMOL/L — SIGNIFICANT CHANGE UP (ref 3.5–5)
POTASSIUM SERPL-SCNC: 4.1 MMOL/L — SIGNIFICANT CHANGE UP (ref 3.5–5)
POTASSIUM SERPL-SCNC: 4.1 MMOL/L — SIGNIFICANT CHANGE UP (ref 3.5–5)
PROT SERPL-MCNC: 6.2 G/DL — SIGNIFICANT CHANGE UP (ref 6–8)
PROT SERPL-MCNC: 6.2 G/DL — SIGNIFICANT CHANGE UP (ref 6–8)
PROTHROM AB SERPL-ACNC: 11.8 SEC — SIGNIFICANT CHANGE UP (ref 9.95–12.87)
PROTHROM AB SERPL-ACNC: 11.8 SEC — SIGNIFICANT CHANGE UP (ref 9.95–12.87)
RBC # BLD: 4.34 M/UL — SIGNIFICANT CHANGE UP (ref 4.2–5.4)
RBC # BLD: 4.34 M/UL — SIGNIFICANT CHANGE UP (ref 4.2–5.4)
RBC # FLD: 28.3 % — HIGH (ref 11.5–14.5)
RBC # FLD: 28.3 % — HIGH (ref 11.5–14.5)
SODIUM SERPL-SCNC: 140 MMOL/L — SIGNIFICANT CHANGE UP (ref 135–146)
SODIUM SERPL-SCNC: 140 MMOL/L — SIGNIFICANT CHANGE UP (ref 135–146)
WBC # BLD: 5.6 K/UL — SIGNIFICANT CHANGE UP (ref 4.8–10.8)
WBC # BLD: 5.6 K/UL — SIGNIFICANT CHANGE UP (ref 4.8–10.8)
WBC # FLD AUTO: 5.6 K/UL — SIGNIFICANT CHANGE UP (ref 4.8–10.8)
WBC # FLD AUTO: 5.6 K/UL — SIGNIFICANT CHANGE UP (ref 4.8–10.8)

## 2023-11-08 PROCEDURE — 99232 SBSQ HOSP IP/OBS MODERATE 35: CPT

## 2023-11-08 RX ORDER — SODIUM CHLORIDE 9 MG/ML
1000 INJECTION, SOLUTION INTRAVENOUS
Refills: 0 | Status: DISCONTINUED | OUTPATIENT
Start: 2023-11-08 | End: 2023-11-08

## 2023-11-08 RX ORDER — SODIUM CHLORIDE 9 MG/ML
1000 INJECTION, SOLUTION INTRAVENOUS
Refills: 0 | Status: DISCONTINUED | OUTPATIENT
Start: 2023-11-08 | End: 2023-11-11

## 2023-11-08 RX ADMIN — Medication 650 MILLIGRAM(S): at 06:08

## 2023-11-08 RX ADMIN — ENOXAPARIN SODIUM 55 MILLIGRAM(S): 100 INJECTION SUBCUTANEOUS at 05:39

## 2023-11-08 RX ADMIN — Medication 650 MILLIGRAM(S): at 05:38

## 2023-11-08 RX ADMIN — SODIUM CHLORIDE 75 MILLILITER(S): 9 INJECTION, SOLUTION INTRAVENOUS at 16:03

## 2023-11-08 RX ADMIN — PANTOPRAZOLE SODIUM 40 MILLIGRAM(S): 20 TABLET, DELAYED RELEASE ORAL at 05:39

## 2023-11-08 RX ADMIN — LEVETIRACETAM 500 MILLIGRAM(S): 250 TABLET, FILM COATED ORAL at 17:21

## 2023-11-08 RX ADMIN — LEVETIRACETAM 500 MILLIGRAM(S): 250 TABLET, FILM COATED ORAL at 05:39

## 2023-11-08 RX ADMIN — MIDODRINE HYDROCHLORIDE 10 MILLIGRAM(S): 2.5 TABLET ORAL at 14:51

## 2023-11-08 RX ADMIN — PANTOPRAZOLE SODIUM 40 MILLIGRAM(S): 20 TABLET, DELAYED RELEASE ORAL at 17:22

## 2023-11-08 RX ADMIN — POLYETHYLENE GLYCOL 3350 17 GRAM(S): 17 POWDER, FOR SOLUTION ORAL at 11:11

## 2023-11-08 RX ADMIN — MIDODRINE HYDROCHLORIDE 10 MILLIGRAM(S): 2.5 TABLET ORAL at 21:07

## 2023-11-08 RX ADMIN — GABAPENTIN 600 MILLIGRAM(S): 400 CAPSULE ORAL at 11:11

## 2023-11-08 RX ADMIN — Medication 650 MILLIGRAM(S): at 17:22

## 2023-11-08 RX ADMIN — Medication 1 TABLET(S): at 11:11

## 2023-11-08 RX ADMIN — CHLORHEXIDINE GLUCONATE 1 APPLICATION(S): 213 SOLUTION TOPICAL at 05:39

## 2023-11-08 RX ADMIN — MIDODRINE HYDROCHLORIDE 10 MILLIGRAM(S): 2.5 TABLET ORAL at 05:38

## 2023-11-08 NOTE — PROGRESS NOTE ADULT - SUBJECTIVE AND OBJECTIVE BOX
24H events:    Patient is a 58y old Female who presents with a chief complaint of Pre-syncope (07 Nov 2023 18:23)    Primary diagnosis of Pre-syncope    Today is hospital day 25d. This morning patient was seen and examined at bedside, resting comfortably in bed.    No acute or major events overnight.      PAST MEDICAL & SURGICAL HISTORY  Down syndrome    Osteoporosis    Mild anemia    Neuropathy    S/P debridement  of R hip on 3/2/21      SOCIAL HISTORY:  Social History:  Lives at nursing home (14 Oct 2023 20:33)      ALLERGIES:  No Known Allergies    MEDICATIONS:  STANDING MEDICATIONS  acetaminophen     Tablet .. 650 milliGRAM(s) Oral every 12 hours  chlorhexidine 2% Cloths 1 Application(s) Topical <User Schedule>  gabapentin 600 milliGRAM(s) Oral daily  lactated ringers. 1000 milliLiter(s) IV Continuous <Continuous>  levETIRAcetam 500 milliGRAM(s) Oral two times a day  midodrine 10 milliGRAM(s) Oral every 8 hours  multivitamin 1 Tablet(s) Oral daily  pantoprazole   Suspension 40 milliGRAM(s) Oral two times a day  polyethylene glycol 3350 17 Gram(s) Oral daily    PRN MEDICATIONS  melatonin 5 milliGRAM(s) Oral at bedtime PRN    VITALS:   T(F): 97.8  HR: 113  BP: 133/63  RR: 18  SpO2: 94%    PHYSICAL EXAM:  GENERAL:   ( X ) NAD, lying in bed comfortably     (  ) obtunded     (  ) lethargic     (  ) somnolent    HEAD:   ( X ) Atraumatic     (  ) hematoma     (  ) laceration (specify location:       )     NECK:  ( X ) Supple     (  ) neck stiffness     (  ) nuchal rigidity     (  )  no JVD     (  ) JVD present ( -- cm)    HEART:  Rate -->     ( X ) normal rate     (  ) bradycardic     (  ) tachycardic  Rhythm -->     ( X ) regular     (  ) regularly irregular     (  ) irregularly irregular  Murmurs -->     ( X ) normal s1s2     (  ) systolic murmur     (  ) diastolic murmur     (  ) continuous murmur      (  ) S3 present     (  ) S4 present    LUNGS:   ( X )Unlabored respirations     (  ) tachypnea  ( X ) B/L air entry     (  ) decreased breath sounds in:  (location     )    ( X ) no adventitious sound     (  ) crackles     (  ) wheezing      (  ) rhonchi      (specify location:       )  (  ) chest wall tenderness (specify location:       )    ABDOMEN:   ( X ) Soft     (  ) tense   |   (  ) nondistended     (  ) distended   |   (  ) +BS     (  ) hypoactive bowel sounds     (  ) hyperactive bowel sounds  ( X ) nontender     (  ) RUQ tenderness     (  ) RLQ tenderness     (  ) LLQ tenderness     (  ) epigastric tenderness     (  ) diffuse tenderness  (  ) Splenomegaly      (  ) Hepatomegaly      (  ) Jaundice     (  ) ecchymosis     NERVOUS SYSTEM:    Non-verbal

## 2023-11-08 NOTE — PROGRESS NOTE ADULT - SUBJECTIVE AND OBJECTIVE BOX
JERICHO TEA  58y  Female  ***My note supersedes ALL resident notes that I sign.  My corrections for their notes are in my note.***    I can be reached directly on [email or web address]. My office number is [phone]. My personal cell number is [phone].    INTERVAL EVENTS: Here for f/u of DFU. For sx tanja. Pt had low BP and incr HR yesterday - did well w/ IVFs. Pt not drinking too much.    T(F): 97.8 (11-08-23 @ 13:30), Max: 100.1 (11-07-23 @ 19:19)  HR: 113 (11-08-23 @ 13:30) (76 - 127)  BP: 133/63 (11-08-23 @ 13:30) (94/60 - 133/63)  RR: 18 (11-08-23 @ 13:30) (18 - 18)  SpO2: 94% (11-08-23 @ 05:15) (94% - 98%)    11-07-23 @ 07:01  -  11-08-23 @ 07:00  --------------------------------------------------------  IN: 0 mL / OUT: 500 mL / NET: -500 mL    Gen: NAD; + Down's facies; + NC O2  HEENT: PERRL, nose clr  Neck: no nodes, no JVD, thyroid nl  lungs: clr  hrt: s1 s2 rrr no murmur  abd: soft, NT/ND, no HS megaly   (changed 11/7): + barlow w/ yel urine w/ fairly coarse ppt; no blood  ext: no edema, no c/c  rt foot: s/p 1st toe amp; in dressing  neuro: awake, NV and not interactive; can move arms decently; can move legs a little (they are mostly contracted)    LABS:    RADIOLOGY & ADDITIONAL TESTS:    MEDICATIONS:    acetaminophen     Tablet .. 650 milliGRAM(s) Oral every 12 hours  chlorhexidine 2% Cloths 1 Application(s) Topical <User Schedule>  gabapentin 600 milliGRAM(s) Oral daily  lactated ringers. 1000 milliLiter(s) IV Continuous <Continuous>  levETIRAcetam 500 milliGRAM(s) Oral two times a day  melatonin 5 milliGRAM(s) Oral at bedtime PRN  midodrine 10 milliGRAM(s) Oral every 8 hours  multivitamin 1 Tablet(s) Oral daily  pantoprazole   Suspension 40 milliGRAM(s) Oral two times a day  polyethylene glycol 3350 17 Gram(s) Oral daily

## 2023-11-08 NOTE — PROGRESS NOTE ADULT - ASSESSMENT
58 year old female with a PMHx of Down syndrome, nonverbal at baseline, hypothyroidism, cerebral palsy, Seizures, presents to the ED from nursing facility for syncope associated with tonic-clonic movement witnessed by aide. Initial vitals at nursing home showed bradycardia and hypoxia for about 10-15 minutes. No fevers, chill, cough, vomiting, diarrhea, difficulty breathing.    Pt initial was admitted to medical floor then on 10/17 was upgraded to ICU for respiratory distress and hypotension and now downgraded to CEU.    #Acute hypoxemic respiratory failure-2/2 Aspiration pneumonia  -aggressive pulm suctioning and chest PT--aspiration precautions including hob elevation (feed with assistance)  -frequent turning/off loading and position change as per protocol with local wound/skin care  -respiratory status improving  - tried on weaning off on the oxygen ; patient has been stable   - S/p meropenem and caspofungin     #Right foot (1st toe stump and 2nd distal phalanx) OM and multiple L foot deep tissue injuries (seen on xray and MRI)  - Patient planned for surgical debridement of the soft tissue and bone ( Right Foot) tomorrow. NPO after MN, IVF, Lovenox held  - Wound care following   - Currently off antibiotics for better yield in bone biopsy ; if patient starts having signs/ symptoms of infection ; to restart antibiotics    #DVT-nonocclusive of L common femoral vein  - on enoxaparin 55 mg subcut q12 hours     #Seizure- abnormal VEEG  - on keprra 500 mg BID   -monitor neuro checks    #Chronic barlow for urinary retention;  - Barlow changed on 11/07 due to leak       DVT: lovenox   guarded prognosis  FULL CODE    pending: Debridement tomorrow     Dispo: Group Home ([facility]) eventually        58 year old female with a PMHx of Down syndrome, nonverbal at baseline, hypothyroidism, cerebral palsy, Seizures, presents to the ED from nursing facility for syncope associated with tonic-clonic movement witnessed by aide. Initial vitals at nursing home showed bradycardia and hypoxia for about 10-15 minutes. No fevers, chill, cough, vomiting, diarrhea, difficulty breathing.    Pt initial was admitted to medical floor then on 10/17 was upgraded to ICU for respiratory distress and hypotension and now downgraded to CEU.    #Acute hypoxemic respiratory failure-2/2 Aspiration pneumonia  -aggressive pulm suctioning and chest PT--aspiration precautions including hob elevation (feed with assistance)  -frequent turning/off loading and position change as per protocol with local wound/skin care  -respiratory status improving  - tried on weaning off on the oxygen ; patient has been stable   - S/p meropenem and caspofungin     #Right foot (1st toe stump and 2nd distal phalanx) OM and multiple L foot deep tissue injuries (seen on xray and MRI)  - Patient planned for surgical debridement of the soft tissue and bone ( Right Foot) tomorrow. NPO after MN, IVF, Lovenox held  - Wound care following   - Currently off antibiotics for better yield in bone biopsy ; will need reassessment after debridement tomorrow    #DVT-nonocclusive of L common femoral vein  - on enoxaparin 55 mg subcut q12 hours     #Seizure- abnormal VEEG  - on keprra 500 mg BID   -monitor neuro checks    #Chronic barlow for urinary retention;  - Barlow changed on 11/07 due to leak   - Precipitate seen in barlow, added IVFs for better hydration: LR 75/hr      DVT: lovenox   guarded prognosis  FULL CODE    pending: Debridement tomorrow     [facility]

## 2023-11-08 NOTE — PROGRESS NOTE ADULT - ASSESSMENT
58 year old woman with a PMHx of Down syndrome, nonverbal, WC bound and gerardo lift at baseline, hypothyroidism, cerebral palsy, congenital pulmonary stenosis, Seizures, presents to the ED from nursing facility for syncope associated with tonic-clonic movement witnessed by aide. Initial vitals at nursing home showed bradycardia and hypoxia for about 10-15 minutes. No fevers, chill, cough, vomiting, diarrhea, difficulty breathing.      Pt initial was admitted to medical floor, then on 10/17 was upgraded to ICU for respiratory distress and hypotension and now downgraded to CEU.    # Limit Labs    # sacral ulcer has healed  cont to turn/position; off load  sacral care per RN team    # Acute hypoxemic respiratory failure-2/2 Aspiration pneumonia  ID noted  abx completed  fungitell + -> completed tx w/ caspofungin   Daily CXR NOT NEEDED  DAILY LABS NOT NEEDED (labs are nl)  aspiration precautions including hob elevation (feed with assistance)  now off O2  f/u pulm  c/w midodrine 10 q8 - (hold mido dose if BP > 140/90)  c/w MVI q24  bowel reg: miralax    # Chronic barlow for urinary retention  barlow changed 11/7  + ppt in barlow line  seems asymp, so no need for U/A or UCx now  will add IVFs for better hydration: LR 75/hr    # Right foot OM (1st toe amp stump and 2nd toe distal phalanx); hx multiple Lt foot deep tissue injuries (seen on xray and MRI)  planned for surgical debridement of the soft tissue and bone for Thurs 11/9 - NPO p MN; IVFs  f/u Pod  Wound care per Pod  f/u ID  Currently off antibiotics for better yield in bone biopsy - will need abx plan from ID post-op  if patient starts having signs/ symptoms of infection -> start broad spectrum antibiotics (also consider U/A and UCx as poss source)  ESR 10/15/23: 67  CRP 10/15/23: 16.1    # Pre-Op eval  LRCRI: 0, but sicker than that  Clinical Risks: Down's pt w/ adv dementia and CP - non-verbal; asp risk; recent PNA w/ AHRF req intubation -> now extubated and off O2; active rt foot bone infections; recent HoTN on midodrine; rolando pulm stenosis?; hypothyroid; WC bound; non-mobile; hx Sz; acute DVT on a/c  Surgical Risk: intermed to low  Functional Status: poor (basically bedbound)    ECG: NSR; no isch change  Echo: (9/2023): nl EF; nl RV; mild MR  CXR: slight decr lt basal opac    Overall, the risk for this pt is intermed. May proceed to OR without any further cardiac work up.     NPO p MN day before OR. IVFs in am of OR. Hold DVT ppx in am of procedure.     # acute DVT Lt common femoral vein  on enoxaparin 55 mg sc q12 - HOLD PM dose for OR    # Seizure  abnormal VEEG  on keprra 500 mg BID   c/w neurontin 600mg q24  outpt neuro f/u    # Down Synd; CP  functional quadriplegia - pt is totally dependent on all ADLs, even eating    # DVT ppx: on therap LMWH - hold night dose on Wed 11/8    # GI ppx: ppi po q12    # FULL CODE    Dispo: f/u with pod re sx Th; hold abx - f/u ID; NPO p MN Wed; IVFs; HOLD a/c Wed night; limit labs; c/w barlow  eventually, pt will likely go to SNF for IV abx (vs back to [facility] on oral abx) - f/u CM - still acute

## 2023-11-09 LAB
1,3 BETA GLUCAN SER QL: POSITIVE
1,3 BETA GLUCAN SER QL: POSITIVE
APTT BLD: 27.3 SEC — SIGNIFICANT CHANGE UP (ref 27–39.2)
APTT BLD: 27.3 SEC — SIGNIFICANT CHANGE UP (ref 27–39.2)
FUNGITELL: 138 PG/ML
FUNGITELL: 138 PG/ML
HCT VFR BLD CALC: 38.1 % — SIGNIFICANT CHANGE UP (ref 37–47)
HCT VFR BLD CALC: 38.1 % — SIGNIFICANT CHANGE UP (ref 37–47)
HGB BLD-MCNC: 11.8 G/DL — LOW (ref 12–16)
HGB BLD-MCNC: 11.8 G/DL — LOW (ref 12–16)
INR BLD: 1.04 RATIO — SIGNIFICANT CHANGE UP (ref 0.65–1.3)
INR BLD: 1.04 RATIO — SIGNIFICANT CHANGE UP (ref 0.65–1.3)
MCHC RBC-ENTMCNC: 24.8 PG — LOW (ref 27–31)
MCHC RBC-ENTMCNC: 24.8 PG — LOW (ref 27–31)
MCHC RBC-ENTMCNC: 31 G/DL — LOW (ref 32–37)
MCHC RBC-ENTMCNC: 31 G/DL — LOW (ref 32–37)
MCV RBC AUTO: 80 FL — LOW (ref 81–99)
MCV RBC AUTO: 80 FL — LOW (ref 81–99)
NRBC # BLD: 0 /100 WBCS — SIGNIFICANT CHANGE UP (ref 0–0)
NRBC # BLD: 0 /100 WBCS — SIGNIFICANT CHANGE UP (ref 0–0)
PLATELET # BLD AUTO: 268 K/UL — SIGNIFICANT CHANGE UP (ref 130–400)
PLATELET # BLD AUTO: 268 K/UL — SIGNIFICANT CHANGE UP (ref 130–400)
PMV BLD: 10.3 FL — SIGNIFICANT CHANGE UP (ref 7.4–10.4)
PMV BLD: 10.3 FL — SIGNIFICANT CHANGE UP (ref 7.4–10.4)
PROTHROM AB SERPL-ACNC: 11.9 SEC — SIGNIFICANT CHANGE UP (ref 9.95–12.87)
PROTHROM AB SERPL-ACNC: 11.9 SEC — SIGNIFICANT CHANGE UP (ref 9.95–12.87)
RBC # BLD: 4.76 M/UL — SIGNIFICANT CHANGE UP (ref 4.2–5.4)
RBC # BLD: 4.76 M/UL — SIGNIFICANT CHANGE UP (ref 4.2–5.4)
RBC # FLD: SIGNIFICANT CHANGE UP % (ref 11.5–14.5)
RBC # FLD: SIGNIFICANT CHANGE UP % (ref 11.5–14.5)
WBC # BLD: 6.88 K/UL — SIGNIFICANT CHANGE UP (ref 4.8–10.8)
WBC # BLD: 6.88 K/UL — SIGNIFICANT CHANGE UP (ref 4.8–10.8)
WBC # FLD AUTO: 6.88 K/UL — SIGNIFICANT CHANGE UP (ref 4.8–10.8)
WBC # FLD AUTO: 6.88 K/UL — SIGNIFICANT CHANGE UP (ref 4.8–10.8)

## 2023-11-09 PROCEDURE — 99232 SBSQ HOSP IP/OBS MODERATE 35: CPT

## 2023-11-09 RX ORDER — ENOXAPARIN SODIUM 100 MG/ML
55 INJECTION SUBCUTANEOUS EVERY 12 HOURS
Refills: 0 | Status: DISCONTINUED | OUTPATIENT
Start: 2023-11-09 | End: 2023-11-09

## 2023-11-09 RX ADMIN — POLYETHYLENE GLYCOL 3350 17 GRAM(S): 17 POWDER, FOR SOLUTION ORAL at 17:12

## 2023-11-09 RX ADMIN — Medication 1 TABLET(S): at 17:07

## 2023-11-09 RX ADMIN — MIDODRINE HYDROCHLORIDE 10 MILLIGRAM(S): 2.5 TABLET ORAL at 17:07

## 2023-11-09 RX ADMIN — Medication 650 MILLIGRAM(S): at 17:50

## 2023-11-09 RX ADMIN — MIDODRINE HYDROCHLORIDE 10 MILLIGRAM(S): 2.5 TABLET ORAL at 21:25

## 2023-11-09 RX ADMIN — Medication 650 MILLIGRAM(S): at 17:06

## 2023-11-09 RX ADMIN — PANTOPRAZOLE SODIUM 40 MILLIGRAM(S): 20 TABLET, DELAYED RELEASE ORAL at 17:06

## 2023-11-09 RX ADMIN — MIDODRINE HYDROCHLORIDE 10 MILLIGRAM(S): 2.5 TABLET ORAL at 05:49

## 2023-11-09 RX ADMIN — SODIUM CHLORIDE 75 MILLILITER(S): 9 INJECTION, SOLUTION INTRAVENOUS at 05:49

## 2023-11-09 RX ADMIN — CHLORHEXIDINE GLUCONATE 1 APPLICATION(S): 213 SOLUTION TOPICAL at 05:49

## 2023-11-09 RX ADMIN — Medication 650 MILLIGRAM(S): at 05:49

## 2023-11-09 RX ADMIN — LEVETIRACETAM 500 MILLIGRAM(S): 250 TABLET, FILM COATED ORAL at 05:49

## 2023-11-09 RX ADMIN — PANTOPRAZOLE SODIUM 40 MILLIGRAM(S): 20 TABLET, DELAYED RELEASE ORAL at 05:49

## 2023-11-09 RX ADMIN — LEVETIRACETAM 500 MILLIGRAM(S): 250 TABLET, FILM COATED ORAL at 17:07

## 2023-11-09 RX ADMIN — GABAPENTIN 600 MILLIGRAM(S): 400 CAPSULE ORAL at 17:06

## 2023-11-09 NOTE — PROGRESS NOTE ADULT - SUBJECTIVE AND OBJECTIVE BOX
JERICHO TEA  58y  Female  ***My note supersedes ALL resident notes that I sign.  My corrections for their notes are in my note.***    I can be reached directly on [employer] [id number]. My office number is [phone]. My personal cell number is [phone].    INTERVAL EVENTS: Here for f/u of DFU. Stable. No issues. OR cancelled today and on for tanja.    T(F): 98.6 (11-09-23 @ 14:36), Max: 99 (11-08-23 @ 19:47)  HR: 70 (11-09-23 @ 14:36) (70 - 82)  BP: 126/64 (11-09-23 @ 14:36) (106/58 - 126/64)  RR: 18 (11-09-23 @ 14:36) (18 - 18)  SpO2: 94% (11-09-23 @ 08:00) (94% - 95%)    11-08-23 @ 07:01  -  11-09-23 @ 07:00  --------------------------------------------------------  IN: 0 mL / OUT: 900 mL / NET: -900 mL    11-09-23 @ 07:01  -  11-09-23 @ 19:12  --------------------------------------------------------  IN: 0 mL / OUT: 400 mL / NET: -400 mL    Gen: NAD; + Down's facies; + NC O2  HEENT: PERRL, nose clr  Neck: no nodes, no JVD, thyroid nl  lungs: clr  hrt: s1 s2 rrr no murmur  abd: soft, NT/ND, no HS megaly   (changed 11/7): + barlow w/ pale yel urine w/ much less coarse ppt; no blood  ext: no edema, no c/c  rt foot: s/p 1st toe amp; in dressing  neuro: awake, NV and not interactive; can move arms decently; can move legs a little (they are mostly contracted)    LABS:                      10.7    (    79.5   5.60  )-----------( ---------      238      ( 08 Nov 2023 20:46 )             34.5    (    28.3     140   (   105   (   119      11-08-23 @ 20:46  ----------------------               4.1   (   24   (   14                             -----                        0.9  Ca  8.2   Mg  --    P   --     LFT  6.2  (  <0.2  (  29       11-08-23 @ 20:46  -------------------------  2.9  (  114  (  36    PT/INR - ( 08 Nov 2023 20:46 )   PT: 11.80 sec;   INR: 1.03 ratio    PTT - ( 08 Nov 2023 20:46 )  PTT: 30.1 sec    Urinalysis Basic - ( 08 Nov 2023 20:46 )    Color: x / Appearance: x / SG: x / pH: x  Gluc: 119 mg/dL / Ketone: x  / Bili: x / Urobili: x   Blood: x / Protein: x / Nitrite: x   Leuk Esterase: x / RBC: x / WBC x   Sq Epi: x / Non Sq Epi: x / Bacteria: x    RADIOLOGY & ADDITIONAL TESTS:    MEDICATIONS:    acetaminophen     Tablet .. 650 milliGRAM(s) Oral every 12 hours  chlorhexidine 2% Cloths 1 Application(s) Topical <User Schedule>  gabapentin 600 milliGRAM(s) Oral daily  lactated ringers. 1000 milliLiter(s) IV Continuous <Continuous>  levETIRAcetam 500 milliGRAM(s) Oral two times a day  melatonin 5 milliGRAM(s) Oral at bedtime PRN  midodrine 10 milliGRAM(s) Oral every 8 hours  multivitamin 1 Tablet(s) Oral daily  pantoprazole   Suspension 40 milliGRAM(s) Oral two times a day  polyethylene glycol 3350 17 Gram(s) Oral daily

## 2023-11-09 NOTE — CHART NOTE - NSCHARTNOTEFT_GEN_A_CORE
Surgery is cancelled today: surgeon not available after experiencing delays in the OR   Rescheduled tomorrow @9:00am  Please make the pt NPO by MN  Optimize lab values prior to OR    Thank You   Podiatry#[id number]

## 2023-11-09 NOTE — PROGRESS NOTE ADULT - SUBJECTIVE AND OBJECTIVE BOX
24H events:    Patient is a 58y old Female who presents with a chief complaint of Pre-syncope (08 Nov 2023 17:00)    Primary diagnosis of Pre-syncope    Today is hospital day 26d. This morning patient was seen and examined at bedside, resting comfortably in bed.    No acute or major events overnight.    PAST MEDICAL & SURGICAL HISTORY  Down syndrome    Osteoporosis    Mild anemia    Neuropathy    S/P debridement  of R hip on 3/2/21      SOCIAL HISTORY:  Social History:  Lives at nursing home (14 Oct 2023 20:33)      ALLERGIES:  No Known Allergies    MEDICATIONS:  STANDING MEDICATIONS  acetaminophen     Tablet .. 650 milliGRAM(s) Oral every 12 hours  chlorhexidine 2% Cloths 1 Application(s) Topical <User Schedule>  enoxaparin Injectable 55 milliGRAM(s) SubCutaneous every 12 hours  gabapentin 600 milliGRAM(s) Oral daily  lactated ringers. 1000 milliLiter(s) IV Continuous <Continuous>  levETIRAcetam 500 milliGRAM(s) Oral two times a day  midodrine 10 milliGRAM(s) Oral every 8 hours  multivitamin 1 Tablet(s) Oral daily  pantoprazole   Suspension 40 milliGRAM(s) Oral two times a day  polyethylene glycol 3350 17 Gram(s) Oral daily    PRN MEDICATIONS  melatonin 5 milliGRAM(s) Oral at bedtime PRN    VITALS:   T(F): 98.6  HR: 70  BP: 126/64  RR: 18  SpO2: 94%    PHYSICAL EXAM:  GENERAL:   ( X ) NAD, lying in bed comfortably     (  ) obtunded     (  ) lethargic     (  ) somnolent    HEAD:   ( X ) Atraumatic     (  ) hematoma     (  ) laceration (specify location:       )     NECK:  ( X ) Supple     (  ) neck stiffness     (  ) nuchal rigidity     (  )  no JVD     (  ) JVD present ( -- cm)    HEART:  Rate -->     ( X ) normal rate     (  ) bradycardic     (  ) tachycardic  Rhythm -->     ( X ) regular     (  ) regularly irregular     (  ) irregularly irregular  Murmurs -->     ( X ) normal s1s2     (  ) systolic murmur     (  ) diastolic murmur     (  ) continuous murmur      (  ) S3 present     (  ) S4 present    LUNGS:   ( X )Unlabored respirations     (  ) tachypnea  ( X ) B/L air entry     (  ) decreased breath sounds in:  (location     )    ( X ) no adventitious sound     (  ) crackles     (  ) wheezing      (  ) rhonchi      (specify location:       )  (  ) chest wall tenderness (specify location:       )    ABDOMEN:   ( X ) Soft     (  ) tense   |   (  ) nondistended     (  ) distended   |   (  ) +BS     (  ) hypoactive bowel sounds     (  ) hyperactive bowel sounds  ( X ) nontender     (  ) RUQ tenderness     (  ) RLQ tenderness     (  ) LLQ tenderness     (  ) epigastric tenderness     (  ) diffuse tenderness  (  ) Splenomegaly      (  ) Hepatomegaly      (  ) Jaundice     (  ) ecchymosis     EXTREMITIES:  ( X ) Normal     (  ) Rash     (  ) ecchymosis     (  ) varicose veins      (  ) pitting edema     (  ) non-pitting edema   (  ) ulceration     (  ) gangrene:     (location:     )    NERVOUS SYSTEM:    Non-talkative patient    LABS:                        10.7   5.60  )-----------( 238      ( 08 Nov 2023 20:46 )             34.5     11-08    140  |  105  |  14  ----------------------------<  119<H>  4.1   |  24  |  0.9    Ca    8.2<L>      08 Nov 2023 20:46    TPro  6.2  /  Alb  2.9<L>  /  TBili  <0.2  /  DBili  x   /  AST  29  /  ALT  36  /  AlkPhos  114  11-08    PT/INR - ( 08 Nov 2023 20:46 )   PT: 11.80 sec;   INR: 1.03 ratio         PTT - ( 08 Nov 2023 20:46 )  PTT:30.1 sec  Urinalysis Basic - ( 08 Nov 2023 20:46 )    Color: x / Appearance: x / SG: x / pH: x  Gluc: 119 mg/dL / Ketone: x  / Bili: x / Urobili: x   Blood: x / Protein: x / Nitrite: x   Leuk Esterase: x / RBC: x / WBC x   Sq Epi: x / Non Sq Epi: x / Bacteria: x

## 2023-11-09 NOTE — PROGRESS NOTE ADULT - ASSESSMENT
58 year old female with a PMHx of Down syndrome, nonverbal at baseline, hypothyroidism, cerebral palsy, Seizures, presents to the ED from nursing facility for syncope associated with tonic-clonic movement witnessed by aide. Initial vitals at nursing home showed bradycardia and hypoxia for about 10-15 minutes. No fevers, chill, cough, vomiting, diarrhea, difficulty breathing.    Pt initial was admitted to medical floor then on 10/17 was upgraded to ICU for respiratory distress and hypotension and now downgraded to CEU.    #Acute hypoxemic respiratory failure-2/2 Aspiration pneumonia  -aggressive pulm suctioning and chest PT--aspiration precautions including hob elevation (feed with assistance)  -frequent turning/off loading and position change as per protocol with local wound/skin care  -respiratory status improving  - tried on weaning off on the oxygen ; patient has been stable   - S/p meropenem and caspofungin     #Right foot (1st toe stump and 2nd distal phalanx) OM and multiple L foot deep tissue injuries (seen on xray and MRI)  - Patient was planned for surgical debridement of the soft tissue and bone ( Right Foot) today. Postponed till tomorrow NPO after MN, IVF, Lovenox held  - Wound care following   - Currently off antibiotics for better yield in bone biopsy ; will need reassessment after debridement tomorrow    #DVT-nonocclusive of L common femoral vein  - on enoxaparin 55 mg subcut q12 hours     #Seizure- abnormal VEEG  - on keprra 500 mg BID   -monitor neuro checks    #Chronic barlow for urinary retention;  - Barlow changed on 11/07 due to leak   - Precipitate seen in barlow, added IVFs for better hydration: LR 75/hr      DVT: lovenox   guarded prognosis  FULL CODE    pending: Debridement tomorrow     Dispo: Group Home ([facility]) eventually

## 2023-11-09 NOTE — PROGRESS NOTE ADULT - ASSESSMENT
58 year old woman with a PMHx of Down syndrome, nonverbal, WC bound and gerardo lift at baseline, hypothyroidism, cerebral palsy, congenital pulmonary stenosis, Seizures, presents to the ED from nursing facility for syncope associated with tonic-clonic movement witnessed by aide. Initial vitals at nursing home showed bradycardia and hypoxia for about 10-15 minutes. No fevers, chill, cough, vomiting, diarrhea, difficulty breathing.      Pt initial was admitted to medical floor, then on 10/17 was upgraded to ICU for respiratory distress and hypotension and now downgraded to CEU.    # Limit Labs    # sacral ulcer has healed  cont to turn/position; off load  sacral care per RN team    # Acute hypoxemic respiratory failure-2/2 aspiration pneumonia  ID noted  abx completed  fungitell + -> completed tx w/ caspofungin   Daily CXR NOT NEEDED  DAILY LABS NOT NEEDED (labs are nl)  aspiration precautions including hob elevation (feed with assistance)  now off O2  f/u pulm  c/w midodrine 10 q8 - (hold mido dose if BP > 140/90)  c/w MVI q24  bowel reg: miralax    # Chronic barlow for urinary retention  barlow changed 11/7  + ppt in barlow line, but much better w/ IVFs  seems asymp, so no need for U/A or UCx now  IVFs for better hydration: LR 75/hr - helping    # Right foot OM (1st toe amp stump and 2nd toe distal phalanx); hx multiple Lt foot deep tissue injuries (seen on xray and MRI)  planned for surgical debridement of the soft tissue and bone for 11/10 -> NPO p MN  f/u Pod  Wound care per Pod  f/u ID  Currently off antibiotics for better yield in bone biopsy - will need abx plan from ID post-op  if patient starts having signs/ symptoms of infection -> start broad spectrum antibiotics (also consider U/A and UCx as poss source)  ESR 10/15/23: 67  CRP 10/15/23: 16.1    # Pre-Op eval  LRCRI: 0, but sicker than that  Clinical Risks: Down's pt w/ adv dementia and CP - non-verbal; asp risk; recent PNA w/ AHRF req intubation -> now extubated and off O2; active rt foot bone infections; recent HoTN on midodrine; rolando pulm stenosis?; hypothyroid; WC bound; non-mobile; hx Sz; acute DVT on a/c  Surgical Risk: intermed to low  Functional Status: poor (basically bedbound)    ECG: NSR; no isch change  Echo: (9/2023): nl EF; nl RV; mild MR  CXR: slight decr lt basal opac    Overall, the risk for this pt is intermed. May proceed to OR without any further cardiac work up.     NPO p MN day before OR. IVFs in am of OR. Hold DVT ppx in am of procedure.     # acute DVT Lt common femoral vein  on enoxaparin 55 mg sc q12 - HOLD PM dose for OR    # Seizure  abnormal VEEG  on keprra 500 mg BID   c/w neurontin 600mg q24  outpt neuro f/u    # Down Synd; CP  functional quadriplegia - pt is totally dependent on all ADLs, even eating    # DVT ppx: on therap LMWH - hold starting w/ tonight's dose    # GI ppx: ppi po q12    # FULL CODE    Dispo: f/u with pod re sx Fri; hold abx - f/u ID; NPO p MN; IVFs; HOLD a/c tonight; limit labs; c/w barlow  eventually, pt will likely go to SNF for IV abx (vs back to [facility] on oral abx) - f/u CM - still acute

## 2023-11-10 ENCOUNTER — RESULT REVIEW (OUTPATIENT)
Age: 58
End: 2023-11-10

## 2023-11-10 LAB
ALBUMIN SERPL ELPH-MCNC: 3.1 G/DL — LOW (ref 3.5–5.2)
ALBUMIN SERPL ELPH-MCNC: 3.1 G/DL — LOW (ref 3.5–5.2)
ALP SERPL-CCNC: 113 U/L — SIGNIFICANT CHANGE UP (ref 30–115)
ALP SERPL-CCNC: 113 U/L — SIGNIFICANT CHANGE UP (ref 30–115)
ALT FLD-CCNC: 38 U/L — SIGNIFICANT CHANGE UP (ref 0–41)
ALT FLD-CCNC: 38 U/L — SIGNIFICANT CHANGE UP (ref 0–41)
ANION GAP SERPL CALC-SCNC: 7 MMOL/L — SIGNIFICANT CHANGE UP (ref 7–14)
ANION GAP SERPL CALC-SCNC: 7 MMOL/L — SIGNIFICANT CHANGE UP (ref 7–14)
AST SERPL-CCNC: 29 U/L — SIGNIFICANT CHANGE UP (ref 0–41)
AST SERPL-CCNC: 29 U/L — SIGNIFICANT CHANGE UP (ref 0–41)
BILIRUB SERPL-MCNC: <0.2 MG/DL — SIGNIFICANT CHANGE UP (ref 0.2–1.2)
BILIRUB SERPL-MCNC: <0.2 MG/DL — SIGNIFICANT CHANGE UP (ref 0.2–1.2)
BUN SERPL-MCNC: 11 MG/DL — SIGNIFICANT CHANGE UP (ref 10–20)
BUN SERPL-MCNC: 11 MG/DL — SIGNIFICANT CHANGE UP (ref 10–20)
CALCIUM SERPL-MCNC: 8.9 MG/DL — SIGNIFICANT CHANGE UP (ref 8.4–10.5)
CALCIUM SERPL-MCNC: 8.9 MG/DL — SIGNIFICANT CHANGE UP (ref 8.4–10.5)
CHLORIDE SERPL-SCNC: 100 MMOL/L — SIGNIFICANT CHANGE UP (ref 98–110)
CHLORIDE SERPL-SCNC: 100 MMOL/L — SIGNIFICANT CHANGE UP (ref 98–110)
CO2 SERPL-SCNC: 27 MMOL/L — SIGNIFICANT CHANGE UP (ref 17–32)
CO2 SERPL-SCNC: 27 MMOL/L — SIGNIFICANT CHANGE UP (ref 17–32)
CREAT SERPL-MCNC: 0.9 MG/DL — SIGNIFICANT CHANGE UP (ref 0.7–1.5)
CREAT SERPL-MCNC: 0.9 MG/DL — SIGNIFICANT CHANGE UP (ref 0.7–1.5)
EGFR: 75 ML/MIN/1.73M2 — SIGNIFICANT CHANGE UP
EGFR: 75 ML/MIN/1.73M2 — SIGNIFICANT CHANGE UP
GLUCOSE SERPL-MCNC: 106 MG/DL — HIGH (ref 70–99)
GLUCOSE SERPL-MCNC: 106 MG/DL — HIGH (ref 70–99)
POTASSIUM SERPL-MCNC: 4.1 MMOL/L — SIGNIFICANT CHANGE UP (ref 3.5–5)
POTASSIUM SERPL-MCNC: 4.1 MMOL/L — SIGNIFICANT CHANGE UP (ref 3.5–5)
POTASSIUM SERPL-SCNC: 4.1 MMOL/L — SIGNIFICANT CHANGE UP (ref 3.5–5)
POTASSIUM SERPL-SCNC: 4.1 MMOL/L — SIGNIFICANT CHANGE UP (ref 3.5–5)
PROT SERPL-MCNC: 6.5 G/DL — SIGNIFICANT CHANGE UP (ref 6–8)
PROT SERPL-MCNC: 6.5 G/DL — SIGNIFICANT CHANGE UP (ref 6–8)
SODIUM SERPL-SCNC: 134 MMOL/L — LOW (ref 135–146)
SODIUM SERPL-SCNC: 134 MMOL/L — LOW (ref 135–146)

## 2023-11-10 PROCEDURE — 88311 DECALCIFY TISSUE: CPT | Mod: 26

## 2023-11-10 PROCEDURE — 28122 PARTIAL REMOVAL OF FOOT BONE: CPT | Mod: 59

## 2023-11-10 PROCEDURE — 20240 BONE BIOPSY OPEN SUPERFICIAL: CPT | Mod: 59

## 2023-11-10 PROCEDURE — 99232 SBSQ HOSP IP/OBS MODERATE 35: CPT

## 2023-11-10 PROCEDURE — 14040 TIS TRNFR F/C/C/M/N/A/G/H/F: CPT

## 2023-11-10 PROCEDURE — 28124 PARTIAL REMOVAL OF TOE: CPT | Mod: T6

## 2023-11-10 PROCEDURE — 88304 TISSUE EXAM BY PATHOLOGIST: CPT | Mod: 26

## 2023-11-10 RX ORDER — LANOLIN ALCOHOL/MO/W.PET/CERES
5 CREAM (GRAM) TOPICAL AT BEDTIME
Refills: 0 | Status: DISCONTINUED | OUTPATIENT
Start: 2023-11-10 | End: 2023-11-20

## 2023-11-10 RX ORDER — METRONIDAZOLE 500 MG
500 TABLET ORAL ONCE
Refills: 0 | Status: COMPLETED | OUTPATIENT
Start: 2023-11-10 | End: 2023-11-10

## 2023-11-10 RX ORDER — VANCOMYCIN HCL 1 G
750 VIAL (EA) INTRAVENOUS EVERY 24 HOURS
Refills: 0 | Status: DISCONTINUED | OUTPATIENT
Start: 2023-11-10 | End: 2023-11-13

## 2023-11-10 RX ORDER — ONDANSETRON 8 MG/1
4 TABLET, FILM COATED ORAL ONCE
Refills: 0 | Status: DISCONTINUED | OUTPATIENT
Start: 2023-11-10 | End: 2023-11-10

## 2023-11-10 RX ORDER — SODIUM CHLORIDE 9 MG/ML
1000 INJECTION, SOLUTION INTRAVENOUS
Refills: 0 | Status: DISCONTINUED | OUTPATIENT
Start: 2023-11-10 | End: 2023-11-10

## 2023-11-10 RX ORDER — ENOXAPARIN SODIUM 100 MG/ML
55 INJECTION SUBCUTANEOUS EVERY 12 HOURS
Refills: 0 | Status: DISCONTINUED | OUTPATIENT
Start: 2023-11-10 | End: 2023-11-16

## 2023-11-10 RX ORDER — CEFEPIME 1 G/1
1000 INJECTION, POWDER, FOR SOLUTION INTRAMUSCULAR; INTRAVENOUS ONCE
Refills: 0 | Status: COMPLETED | OUTPATIENT
Start: 2023-11-10 | End: 2023-11-10

## 2023-11-10 RX ORDER — METRONIDAZOLE 500 MG
TABLET ORAL
Refills: 0 | Status: DISCONTINUED | OUTPATIENT
Start: 2023-11-10 | End: 2023-11-12

## 2023-11-10 RX ORDER — METRONIDAZOLE 500 MG
500 TABLET ORAL EVERY 12 HOURS
Refills: 0 | Status: DISCONTINUED | OUTPATIENT
Start: 2023-11-11 | End: 2023-11-12

## 2023-11-10 RX ORDER — GABAPENTIN 400 MG/1
600 CAPSULE ORAL DAILY
Refills: 0 | Status: DISCONTINUED | OUTPATIENT
Start: 2023-11-10 | End: 2023-11-20

## 2023-11-10 RX ORDER — MIDODRINE HYDROCHLORIDE 2.5 MG/1
10 TABLET ORAL EVERY 8 HOURS
Refills: 0 | Status: DISCONTINUED | OUTPATIENT
Start: 2023-11-10 | End: 2023-11-20

## 2023-11-10 RX ORDER — CEFEPIME 1 G/1
1000 INJECTION, POWDER, FOR SOLUTION INTRAMUSCULAR; INTRAVENOUS EVERY 12 HOURS
Refills: 0 | Status: DISCONTINUED | OUTPATIENT
Start: 2023-11-11 | End: 2023-11-13

## 2023-11-10 RX ORDER — CEFEPIME 1 G/1
INJECTION, POWDER, FOR SOLUTION INTRAMUSCULAR; INTRAVENOUS
Refills: 0 | Status: DISCONTINUED | OUTPATIENT
Start: 2023-11-10 | End: 2023-11-13

## 2023-11-10 RX ORDER — ACETAMINOPHEN 500 MG
650 TABLET ORAL EVERY 12 HOURS
Refills: 0 | Status: DISCONTINUED | OUTPATIENT
Start: 2023-11-10 | End: 2023-11-20

## 2023-11-10 RX ORDER — POLYETHYLENE GLYCOL 3350 17 G/17G
17 POWDER, FOR SOLUTION ORAL DAILY
Refills: 0 | Status: DISCONTINUED | OUTPATIENT
Start: 2023-11-10 | End: 2023-11-11

## 2023-11-10 RX ORDER — LEVETIRACETAM 250 MG/1
500 TABLET, FILM COATED ORAL
Refills: 0 | Status: DISCONTINUED | OUTPATIENT
Start: 2023-11-10 | End: 2023-11-20

## 2023-11-10 RX ORDER — HYDROMORPHONE HYDROCHLORIDE 2 MG/ML
0.5 INJECTION INTRAMUSCULAR; INTRAVENOUS; SUBCUTANEOUS
Refills: 0 | Status: DISCONTINUED | OUTPATIENT
Start: 2023-11-10 | End: 2023-11-10

## 2023-11-10 RX ADMIN — POLYETHYLENE GLYCOL 3350 17 GRAM(S): 17 POWDER, FOR SOLUTION ORAL at 17:18

## 2023-11-10 RX ADMIN — GABAPENTIN 600 MILLIGRAM(S): 400 CAPSULE ORAL at 17:18

## 2023-11-10 RX ADMIN — Medication 650 MILLIGRAM(S): at 17:17

## 2023-11-10 RX ADMIN — CEFEPIME 100 MILLIGRAM(S): 1 INJECTION, POWDER, FOR SOLUTION INTRAMUSCULAR; INTRAVENOUS at 18:53

## 2023-11-10 RX ADMIN — Medication 1 TABLET(S): at 17:17

## 2023-11-10 RX ADMIN — MIDODRINE HYDROCHLORIDE 10 MILLIGRAM(S): 2.5 TABLET ORAL at 05:48

## 2023-11-10 RX ADMIN — CHLORHEXIDINE GLUCONATE 1 APPLICATION(S): 213 SOLUTION TOPICAL at 05:48

## 2023-11-10 RX ADMIN — Medication 100 MILLIGRAM(S): at 18:54

## 2023-11-10 RX ADMIN — Medication 250 MILLIGRAM(S): at 18:54

## 2023-11-10 RX ADMIN — ENOXAPARIN SODIUM 55 MILLIGRAM(S): 100 INJECTION SUBCUTANEOUS at 20:21

## 2023-11-10 RX ADMIN — LEVETIRACETAM 500 MILLIGRAM(S): 250 TABLET, FILM COATED ORAL at 05:48

## 2023-11-10 RX ADMIN — LEVETIRACETAM 500 MILLIGRAM(S): 250 TABLET, FILM COATED ORAL at 17:17

## 2023-11-10 RX ADMIN — MIDODRINE HYDROCHLORIDE 10 MILLIGRAM(S): 2.5 TABLET ORAL at 21:56

## 2023-11-10 RX ADMIN — PANTOPRAZOLE SODIUM 40 MILLIGRAM(S): 20 TABLET, DELAYED RELEASE ORAL at 05:51

## 2023-11-10 RX ADMIN — Medication 650 MILLIGRAM(S): at 07:00

## 2023-11-10 RX ADMIN — Medication 650 MILLIGRAM(S): at 05:48

## 2023-11-10 RX ADMIN — Medication 650 MILLIGRAM(S): at 18:53

## 2023-11-10 NOTE — PROGRESS NOTE ADULT - ASSESSMENT
58 year old woman with a PMHx of Down syndrome, nonverbal, WC bound and gerardo lift at baseline, hypothyroidism, cerebral palsy, congenital pulmonary stenosis, Seizures, presents to the ED from nursing facility for syncope associated with tonic-clonic movement witnessed by aide. Initial vitals at nursing home showed bradycardia and hypoxia for about 10-15 minutes. No fevers, chill, cough, vomiting, diarrhea, difficulty breathing.      Pt initial was admitted to medical floor, then on 10/17 was upgraded to ICU for respiratory distress and hypotension and now downgraded to CEU.    # Limit Labs    # sacral ulcer has healed  cont to turn/position; off load  sacral care per RN team    # Acute hypoxemic respiratory failure-2/2 aspiration pneumonia  ID noted  abx completed  fungitell + -> completed tx w/ caspofungin   Daily CXR NOT NEEDED  DAILY LABS NOT NEEDED (labs are nl)  aspiration precautions including hob elevation (feed with assistance)  now off O2  f/u pulm  c/w midodrine 10 q8 - (hold mido dose if BP > 140/90)  c/w MVI q24  bowel reg: miralax    # Chronic barlow for urinary retention  barlow changed 11/7  + ppt in barlow line, but much better w/ IVFs  seems asymp, so no need for U/A or UCx now  IVFs for better hydration: LR 75/hr - helping    # fungitell + x3  f/u w/ ID    # Right foot OM (1st toe amp stump and 2nd toe distal phalanx); hx multiple Lt foot deep tissue injuries (seen on xray and MRI)  f/u Pod: s/p amp thru 1st MT head and partial 2nd digit amp -> this should eradicate infection and OM  Wound care per Pod  f/u ID  Currently off antibiotics for better yield in bone biopsy - will need abx plan from ID post-op (placed call to service; formal f/u requested in [place])  if patient starts having signs/ symptoms of infection -> start broad spectrum antibiotics (also consider U/A and UCx as poss source)  ESR 10/15/23: 67  CRP 10/15/23: 16.1    # acute DVT Lt common femoral vein  on enoxaparin 55 mg sc q12 - resume when OK w/ POD    # Seizure  abnormal VEEG  on keprra 500 mg BID   c/w neurontin 600mg q24  outpt neuro f/u    # Down Synd; CP  functional quadriplegia - pt is totally dependent on all ADLs, even eating    # DVT ppx: on therap LMWH - resume when OK w/ Pod    # GI ppx: ppi po q12    # FULL CODE    Dispo: f/u with pod; hold abx - f/u ID; IVFs; HOLD a/c tonight; limit labs; c/w yen  eventually, pt will likely go back to  w. wound care (vs SNF) - f/u CM - still acute 58 year old woman with a PMHx of Down syndrome, nonverbal, WC bound and gerardo lift at baseline, hypothyroidism, cerebral palsy, congenital pulmonary stenosis, Seizures, presents to the ED from nursing facility for syncope associated with tonic-clonic movement witnessed by aide. Initial vitals at nursing home showed bradycardia and hypoxia for about 10-15 minutes. No fevers, chill, cough, vomiting, diarrhea, difficulty breathing.      Pt initial was admitted to medical floor, then on 10/17 was upgraded to ICU for respiratory distress and hypotension and now downgraded to CEU.    # Limit Labs    # sacral ulcer has healed  cont to turn/position; off load  sacral care per RN team    # Acute hypoxemic respiratory failure-2/2 aspiration pneumonia  ID noted  abx completed  fungitell + -> completed tx w/ caspofungin   Daily CXR NOT NEEDED  DAILY LABS NOT NEEDED (labs are nl)  aspiration precautions including hob elevation (feed with assistance)  now off O2  f/u pulm  c/w midodrine 10 q8 - (hold mido dose if BP > 140/90)  c/w MVI q24  bowel reg: miralax    # Chronic barlow for urinary retention  barlow changed 11/7  + ppt in barlow line, but much better w/ IVFs  seems asymp, so no need for U/A or UCx now  IVFs for better hydration: LR 75/hr - helping    # fungitell + x3  spoke w/ ID: this was previously covered in ICU w/ caspofungin  no further tx    # Right foot OM (1st toe amp stump and 2nd toe distal phalanx); hx multiple Lt foot deep tissue injuries (seen on xray and MRI)  f/u Pod: s/p amp thru 1st MT head and partial 2nd digit amp -> this should eradicate infection and OM  Wound care per Pod  f/u ID  was off antibiotics for better yield in bone biopsy  spoke w/ ID: abx are needed until intra-op cx and path are back  abx: vanco 750mg iv q24 + cefepime 1gm iv q12 + flagyl 500mg iv q12  formal ID f/u requested in [place]  ESR 10/15/23: 67  CRP 10/15/23: 16.1    # acute DVT Lt common femoral vein  on enoxaparin 55 mg sc q12 - resume when OK w/ POD    # Seizure  abnormal VEEG  on keprra 500 mg BID   c/w neurontin 600mg q24  outpt neuro f/u    # Down Synd; CP  functional quadriplegia - pt is totally dependent on all ADLs, even eating    # DVT ppx: on therap LMWH - resume when OK w/ Pod    # GI ppx: ppi po q12    # FULL CODE    Dispo: f/u with pod; iv abx - f/u ID; IVFs; HOLD a/c tonight; limit labs; c/w barlow  eventually, pt will likely go back to  w. wound care (vs SNF) - f/u CM - still acute

## 2023-11-10 NOTE — PRE-OP CHECKLIST - DNR CLARIFICATION FORM COMPLETED
General Surgery/Colorectal Surgery Note    Patient Name:  Chasity Krishnan  YOB: 1994  [id number]    Referring Provider: No ref. provider found      Patient Care Team:  Robert Rios MD as PCP - General (Internal Medicine)    Chief complaint rectal bleeding    Subjective .     History of present illness: She comes in for 1 week history of painless hematochezia with clots in her stool.  No history of the same.  No pain with bowel movements.  She has some generalized cramping abdominal pain 5 out of 10 in severity.  50 pound weight loss since November 2020.  At that time she had diarrhea with some clots in her stool.  No previous colonoscopy.  Family history of grandfather x2 with colorectal cancer.  No fiber use.  No tissue prolapse.  History of constipation.  No history of ulcer disease.  No NSAID abuse.  No tobacco use.  She also has significant reflux having to sleep with her head propped up.  Substernal burning.  No current treatment.    Recent JASIEL testing positive.  Hemoglobin 13.0 6/15/2021.      History:  History reviewed. No pertinent past medical history.    No past surgical history on file.    History reviewed. No pertinent family history.    Social History     Tobacco Use   • Smoking status: Never Smoker   • Smokeless tobacco: Never Used   Substance Use Topics   • Alcohol use: Not on file   • Drug use: Not on file       Review of Systems  All systems were reviewed and negative except for:   Review of Systems   Constitutional: Negative for chills, fever and unexpected weight loss.   HENT: Negative for congestion, nosebleeds and voice change.    Eyes: Negative for blurred vision, double vision and discharge.   Respiratory: Negative for apnea, chest tightness and shortness of breath.    Cardiovascular: Negative for chest pain and leg swelling.   Gastrointestinal:        See HPI   Endocrine: Negative for cold intolerance and heat intolerance.   Genitourinary: Negative for dysuria,  hematuria and urgency.   Musculoskeletal: Negative for back pain, joint swelling and neck pain.   Skin: Negative for color change and dry skin.   Neurological: Negative for dizziness and confusion.   Hematological: Negative for adenopathy.   Psychiatric/Behavioral: Negative for agitation and behavioral problems.     MEDS:  Prior to Admission medications    Medication Sig Start Date End Date Taking? Authorizing Provider   ergocalciferol (ERGOCALCIFEROL) 1.25 MG (23978 UT) capsule Vitamin D2 1,250 mcg (50,000 unit) oral capsule take 1 capsule by oral route daily   Active   Yes Karolina Raines MD   ferrous sulfate 325 (65 FE) MG tablet Take 325 mg by mouth Daily. 3/29/21  Yes Karolina Raines MD   clonazePAM (KlonoPIN) 1 MG tablet     Karolina Raines MD   sertraline (ZOLOFT) 50 MG tablet Take 25 mg by mouth Daily. 4/12/21   Karolina Raines MD   sodium-potassium-magnesium sulfates (SUPREP) 17.5-3.13-1.6 GM/177ML solution oral solution Take as directed 6/17/21   Rigo Lopez MD        Allergies:  Alpha-gal, Sumatriptan, Tramadol, Hydroxyzine, and Simvastatin    Objective     Vital Signs   Resp:  [16] 16    Physical Exam:     General Appearance:    Alert, cooperative, in no acute distress   Head:    Normocephalic, without obvious abnormality, atraumatic   Eyes:          Conjunctivae and sclerae normal, no icterus,     Ears:    Ears appear intact with no abnormalities noted   Throat:   No oral lesions, no thrush, oral mucosa moist   Neck:   No adenopathy, supple, trachea midline, no thyromegaly   Back:     No kyphosis present, no scoliosis present, no skin lesions,      erythema or scars, no tenderness to percussion or                   palpation,   range of motion normal   Lungs:     Clear to auscultation,respirations regular, even and                  unlabored    Heart:    Regular rhythm and normal rate, normal S1 and S2, no            murmur, no gallop, no rub, no click   Chest Wall:     "No abnormalities observed   Abdomen:     Normal bowel sounds, no masses, no organomegaly, soft        non-tender, non-distended, no guarding, no rebound                tenderness   Rectal:        Extremities:   Moves all extremities well, no edema, no cyanosis, no             redness   Pulses:   Pulses palpable and equal bilaterally   Skin:   No bleeding, bruising or rash   Lymph nodes:   No palpable adenopathy   Neurologic:   A/o x 4 with no deficits       Results Review:   {Results Review:[id number]::\"I reviewed the patient's new clinical results.\"    LABS/IMAGING:  No results found for this or any previous visit.     Result Review :     Assessment/Plan     Hematochezia with clots in her stool, 50 pound weight loss, GERD    I had a discussion with the patient.  I reviewed her labs with the results mentioned above.  I recommended EGD, colonoscopy, rubber band ligation of internal hemorrhoids.  Benefits and alternatives discussed.  Risk of procedure including bleeding, perforation, pain, infection discussed.  All questions answered.  She agrees with the plan.  Thank you for the consult.          Rigo Lopez MD  06/17/21 15:57 EDT  " n/a

## 2023-11-10 NOTE — PROGRESS NOTE ADULT - SUBJECTIVE AND OBJECTIVE BOX
24H events:    Patient is a 58y old Female who presents with a chief complaint of Pre-syncope (09 Nov 2023 19:12)    Primary diagnosis of Pre-syncope      Today is hospital day 27d. This morning patient was seen and examined at bedside, resting comfortably in bed.      Patient underwent surgery today   Urination better     Code Status: FULL       PAST MEDICAL & SURGICAL HISTORY  Down syndrome    Osteoporosis    Mild anemia    Neuropathy    S/P debridement  of R hip on 3/2/21      SOCIAL HISTORY:  Social History:  Lives at nursing home (14 Oct 2023 20:33)      ALLERGIES:  No Known Allergies    MEDICATIONS:  STANDING MEDICATIONS  acetaminophen     Tablet .. 650 milliGRAM(s) Oral every 12 hours  gabapentin 600 milliGRAM(s) Oral daily  lactated ringers. 1000 milliLiter(s) IV Continuous <Continuous>  lactated ringers. 1000 milliLiter(s) IV Continuous <Continuous>  levETIRAcetam 500 milliGRAM(s) Oral two times a day  midodrine 10 milliGRAM(s) Oral every 8 hours  multivitamin 1 Tablet(s) Oral daily  polyethylene glycol 3350 17 Gram(s) Oral daily    PRN MEDICATIONS  HYDROmorphone  Injectable 0.5 milliGRAM(s) IV Push every 10 minutes PRN  melatonin 5 milliGRAM(s) Oral at bedtime PRN  ondansetron Injectable 4 milliGRAM(s) IV Push once PRN    VITALS:   T(F): 98.2  HR: 100  BP: 128/69  RR: 17  SpO2: 95%    PHYSICAL EXAM:    GENERAL:   ( X ) NAD, lying in bed comfortably     (  ) obtunded     (  ) lethargic     (  ) somnolent    HEAD:   ( X ) Atraumatic     (  ) hematoma     (  ) laceration (specify location:       )     NECK:  ( X ) Supple     (  ) neck stiffness     (  ) nuchal rigidity     (  )  no JVD     (  ) JVD present ( -- cm)    HEART:  Rate -->     ( X ) normal rate     (  ) bradycardic     (  ) tachycardic  Rhythm -->     ( X ) regular     (  ) regularly irregular     (  ) irregularly irregular  Murmurs -->     ( X ) normal s1s2     (  ) systolic murmur     (  ) diastolic murmur     (  ) continuous murmur      (  ) S3 present     (  ) S4 present    LUNGS:   ( X )Unlabored respirations     (  ) tachypnea  ( X ) B/L air entry     (  ) decreased breath sounds in:  (location     )    ( X ) no adventitious sound     (  ) crackles     (  ) wheezing      (  ) rhonchi      (specify location:       )  (  ) chest wall tenderness (specify location:       )    ABDOMEN:   ( X ) Soft     (  ) tense   |   (  ) nondistended     (  ) distended   |   (  ) +BS     (  ) hypoactive bowel sounds     (  ) hyperactive bowel sounds  ( X ) nontender     (  ) RUQ tenderness     (  ) RLQ tenderness     (  ) LLQ tenderness     (  ) epigastric tenderness     (  ) diffuse tenderness  (  ) Splenomegaly      (  ) Hepatomegaly      (  ) Jaundice     (  ) ecchymosis     EXTREMITIES:  ( X ) Normal     (  ) Rash     (  ) ecchymosis     (  ) varicose veins      (  ) pitting edema     (  ) non-pitting edema   (  ) ulceration     (  ) gangrene:     (location:     )    NERVOUS SYSTEM:    Non-talkative patient      LABS:                        11.8   6.88  )-----------( 268      ( 09 Nov 2023 21:29 )             38.1     11-10    134<L>  |  100  |  11  ----------------------------<  106<H>  4.1   |  27  |  0.9    Ca    8.9      10 Nov 2023 01:56    TPro  6.5  /  Alb  3.1<L>  /  TBili  <0.2  /  DBili  x   /  AST  29  /  ALT  38  /  AlkPhos  113  11-10    PT/INR - ( 09 Nov 2023 21:29 )   PT: 11.90 sec;   INR: 1.04 ratio         PTT - ( 09 Nov 2023 21:29 )  PTT:27.3 sec  Urinalysis Basic - ( 10 Nov 2023 01:56 )    Color: x / Appearance: x / SG: x / pH: x  Gluc: 106 mg/dL / Ketone: x  / Bili: x / Urobili: x   Blood: x / Protein: x / Nitrite: x   Leuk Esterase: x / RBC: x / WBC x   Sq Epi: x / Non Sq Epi: x / Bacteria: x

## 2023-11-10 NOTE — CHART NOTE - NSCHARTNOTEFT_GEN_A_CORE
PACU ANESTHESIA PACU ADMISSION NOTE      Procedure:  Post op diagnosis    ____ Intubated  TV:______       Rate: ______      FiO2: ______    __x__ Patent Airway    ____ Full return of protective reflexes    ____ Full recovery from anesthesia / sedation to baseline status    Viitals:  see anesthesia record          Mental Status:  ____ Awake   __x___ Alert   _____ Drowsy   _____ Sedated    Nausea/Vomiting: ____ Yes, See Post - Op Orders      __x__ No    Pain Scale (0-10): _____    Treatment: ____ None    __x__ See Post - Op/PCA Orders    Post - Operative Fluids:   ____ Oral   ___x_ See Post - Op Orders    Plan:         Discharge:   ____Home       __x___Floor         _____Critical Care    _____Other:_________________    Comments: uneventful perioperative course; no s/s of anesthesia complications noted; D/C floor when criteria met

## 2023-11-10 NOTE — PROGRESS NOTE ADULT - ASSESSMENT
58 year old female with a PMHx of Down syndrome, nonverbal at baseline, hypothyroidism, cerebral palsy, Seizures, presents to the ED from nursing facility for syncope associated with tonic-clonic movement witnessed by aide. Initial vitals at nursing home showed bradycardia and hypoxia for about 10-15 minutes. No fevers, chill, cough, vomiting, diarrhea, difficulty breathing.    Pt initial was admitted to medical floor then on 10/17 was upgraded to ICU for respiratory distress and hypotension and now downgraded to CEU.    #Right foot (1st toe stump and 2nd distal phalanx) OM and multiple L foot deep tissue injuries (seen on xray and MRI)  - Patient underwent excisional debridement of ulcer of right foot (including 1st metatarsal) and partial amputation 2nd toe   - follow up with ID for decision of antibiotics   - Wound care following       #Acute hypoxemic respiratory failure-2/2 Aspiration pneumonia  -aggressive pulm suctioning and chest PT--aspiration precautions including hob elevation (feed with assistance)  -frequent turning/off loading and position change as per protocol with local wound/skin care  -respiratory status improving  - tried on weaning off on the oxygen ; patient has been stable   - S/p meropenem and caspofungin     #DVT-nonocclusive of L common femoral vein  - On hold for surgery   - on enoxaparin 55 mg subcut q12 hours     #Seizure- abnormal VEEG  - on keprra 500 mg BID   -monitor neuro checks    #Chronic barlow for urinary retention;  - Barlow changed on 11/07 due to leak   - Precipitate seen in barlow, added IVFs for better hydration: LR 75/hr  - The urine looks better on 11/10      DVT: On hold   guarded prognosis  FULL CODE    pending: Debridement tomorrow     Dispo: Group Home ([facility]) eventually    58 year old female with a PMHx of Down syndrome, nonverbal at baseline, hypothyroidism, cerebral palsy, Seizures, presents to the ED from nursing facility for syncope associated with tonic-clonic movement witnessed by aide. Initial vitals at nursing home showed bradycardia and hypoxia for about 10-15 minutes. No fevers, chill, cough, vomiting, diarrhea, difficulty breathing.    Pt initial was admitted to medical floor then on 10/17 was upgraded to ICU for respiratory distress and hypotension and now downgraded to CEU.    #Right foot (1st toe stump and 2nd distal phalanx) OM and multiple L foot deep tissue injuries (seen on xray and MRI)  - Patient underwent excisional debridement of ulcer of right foot (including 1st metatarsal) and partial amputation 2nd toe   - follow up with ID for decision of antibiotics   - Wound care following       #Acute hypoxemic respiratory failure-2/2 Aspiration pneumonia  -aggressive pulm suctioning and chest PT--aspiration precautions including hob elevation (feed with assistance)  -frequent turning/off loading and position change as per protocol with local wound/skin care  -respiratory status improving  - tried on weaning off on the oxygen ; patient has been stable   - S/p meropenem and caspofungin     #DVT-nonocclusive of L common femoral vein  - on enoxaparin 55 mg subcut q12 hours starting for 8 pm ( 11/10) as per surgery     #Seizure- abnormal VEEG  - on keprra 500 mg BID   -monitor neuro checks    #Chronic barlow for urinary retention;  - Barlow changed on 11/07 due to leak   - Precipitate seen in barlow, added IVFs for better hydration: LR 75/hr  - The urine looks better on 11/10      DVT: starting on lovenox 55 mg sq q 12 hours since 8 pm ; cleared by surgery   guarded prognosis  FULL CODE    Dispo: Group Home ([facility]) eventually

## 2023-11-10 NOTE — BRIEF OPERATIVE NOTE - NSICDXBRIEFPROCEDURE_GEN_ALL_CORE_FT
PROCEDURES:  Excisional debridement of ulcer of right foot 10-Nov-2023 14:08:47  Gurinder Mcadams  Partial amputation of second toe 10-Nov-2023 14:10:20  Gurinder Mcadams

## 2023-11-10 NOTE — BRIEF OPERATIVE NOTE - COMMENTS
patient had clean margins resected from second digit and sent to pathology. 1st metatarsal head resected for offloading of ulceration

## 2023-11-10 NOTE — PROGRESS NOTE ADULT - SUBJECTIVE AND OBJECTIVE BOX
JERICHO TEA  58y  Female  ***My note supersedes ALL resident notes that I sign.  My corrections for their notes are in my note.***    I can be reached directly on [id number]. My office number is [phone]. My personal cell number is [phone].    INTERVAL EVENTS: Here for f/u of foot ulcer. Pt s/p debridement. Non-verbal. Seems comfortable and not in distress.    T(F): 98.2 (11-10-23 @ 13:56), Max: 98.2 (11-10-23 @ 13:56)  HR: 100 (11-10-23 @ 15:10) (64 - 100)  BP: 128/69 (11-10-23 @ 15:10) (96/51 - 128/69)  RR: 17 (11-10-23 @ 15:10) (16 - 85)  SpO2: 95% (11-10-23 @ 15:00) (92% - 99%)    11-09-23 @ 07:01  -  11-10-23 @ 07:00  --------------------------------------------------------  IN: 0 mL / OUT: 950 mL / NET: -950 mL    Gen: NAD; + Down's facies;  HEENT: PERRL, nose clr  Neck: no nodes, no JVD, thyroid nl  lungs: clr  hrt: s1 s2 rrr no murmur  abd: soft, NT/ND, no HS megaly   (changed 11/7): + barlow w/ pale yel urine w/ much less coarse ppt; no blood  ext: no edema, no c/c  rt foot: in dressing  neuro: awake, NV and not interactive; can move arms somewhat; can't move legs much at all (they are very contracted)    LABS:                      11.8    (    80.0   6.88  )-----------( ---------      268      ( 09 Nov 2023 21:29 )             38.1    (    #SN      134   (   100   (   106      11-10-23 @ 01:56  ----------------------               4.1   (   27   (   11                             -----                        0.9  Ca  8.9   Mg  --    P   --     LFT  6.5  (  <0.2  (  29       11-10-23 @ 01:56  -------------------------  3.1  (  113  (  38    PT/INR - ( 09 Nov 2023 21:29 )   PT: 11.90 sec;   INR: 1.04 ratio    PTT - ( 09 Nov 2023 21:29 )  PTT: 27.3 sec    Urinalysis Basic - ( 10 Nov 2023 01:56 )    Color: x / Appearance: x / SG: x / pH: x  Gluc: 106 mg/dL / Ketone: x  / Bili: x / Urobili: x   Blood: x / Protein: x / Nitrite: x   Leuk Esterase: x / RBC: x / WBC x   Sq Epi: x / Non Sq Epi: x / Bacteria: x    RADIOLOGY & ADDITIONAL TESTS:    MEDICATIONS:    acetaminophen     Tablet .. 650 milliGRAM(s) Oral every 12 hours  enoxaparin Injectable 55 milliGRAM(s) SubCutaneous every 12 hours  gabapentin 600 milliGRAM(s) Oral daily  lactated ringers. 1000 milliLiter(s) IV Continuous <Continuous>  levETIRAcetam 500 milliGRAM(s) Oral two times a day  melatonin 5 milliGRAM(s) Oral at bedtime PRN  midodrine 10 milliGRAM(s) Oral every 8 hours  multivitamin 1 Tablet(s) Oral daily  polyethylene glycol 3350 17 Gram(s) Oral daily

## 2023-11-11 LAB
NIGHT BLUE STAIN TISS: SIGNIFICANT CHANGE UP
SPECIMEN SOURCE: SIGNIFICANT CHANGE UP

## 2023-11-11 PROCEDURE — 99232 SBSQ HOSP IP/OBS MODERATE 35: CPT

## 2023-11-11 RX ADMIN — Medication 250 MILLIGRAM(S): at 17:28

## 2023-11-11 RX ADMIN — CEFEPIME 100 MILLIGRAM(S): 1 INJECTION, POWDER, FOR SOLUTION INTRAMUSCULAR; INTRAVENOUS at 17:28

## 2023-11-11 RX ADMIN — POLYETHYLENE GLYCOL 3350 17 GRAM(S): 17 POWDER, FOR SOLUTION ORAL at 11:20

## 2023-11-11 RX ADMIN — MIDODRINE HYDROCHLORIDE 10 MILLIGRAM(S): 2.5 TABLET ORAL at 14:31

## 2023-11-11 RX ADMIN — Medication 650 MILLIGRAM(S): at 17:26

## 2023-11-11 RX ADMIN — MIDODRINE HYDROCHLORIDE 10 MILLIGRAM(S): 2.5 TABLET ORAL at 05:31

## 2023-11-11 RX ADMIN — LEVETIRACETAM 500 MILLIGRAM(S): 250 TABLET, FILM COATED ORAL at 05:31

## 2023-11-11 RX ADMIN — ENOXAPARIN SODIUM 55 MILLIGRAM(S): 100 INJECTION SUBCUTANEOUS at 11:20

## 2023-11-11 RX ADMIN — CEFEPIME 100 MILLIGRAM(S): 1 INJECTION, POWDER, FOR SOLUTION INTRAMUSCULAR; INTRAVENOUS at 05:30

## 2023-11-11 RX ADMIN — LEVETIRACETAM 500 MILLIGRAM(S): 250 TABLET, FILM COATED ORAL at 17:26

## 2023-11-11 RX ADMIN — Medication 650 MILLIGRAM(S): at 05:31

## 2023-11-11 RX ADMIN — ENOXAPARIN SODIUM 55 MILLIGRAM(S): 100 INJECTION SUBCUTANEOUS at 21:41

## 2023-11-11 RX ADMIN — MIDODRINE HYDROCHLORIDE 10 MILLIGRAM(S): 2.5 TABLET ORAL at 21:40

## 2023-11-11 RX ADMIN — Medication 650 MILLIGRAM(S): at 06:27

## 2023-11-11 RX ADMIN — Medication 100 MILLIGRAM(S): at 17:28

## 2023-11-11 RX ADMIN — Medication 1 TABLET(S): at 11:20

## 2023-11-11 RX ADMIN — Medication 100 MILLIGRAM(S): at 05:30

## 2023-11-11 RX ADMIN — GABAPENTIN 600 MILLIGRAM(S): 400 CAPSULE ORAL at 11:20

## 2023-11-11 NOTE — PROGRESS NOTE ADULT - SUBJECTIVE AND OBJECTIVE BOX
TEA ECHAVARRIA  58y, Female  Allergy: No Known Allergies      LOS  28d    CHIEF COMPLAINT: Pre-syncope (10 Nov 2023 17:35)      INTERVAL EVENTS/HPI  - No acute events overnight  - T(F): , Max: 98.4 (11-10-23 @ 19:29)  - Denies any worsening symptoms  - Tolerating medication  - WBC Count: 6.88 (11-09-23 @ 21:29)  WBC Count: 5.60 (11-08-23 @ 20:46)     - Creatinine: 0.9 (11-10-23 @ 01:56)       ROS  unable to obtain history secondary to patient's mental status and/or sedation      VITALS:  T(F): 96.8, Max: 98.4 (11-10-23 @ 19:29)  HR: 90  BP: 110/59  RR: 18Vital Signs Last 24 Hrs  T(C): 36 (11 Nov 2023 05:30), Max: 36.9 (10 Nov 2023 19:29)  T(F): 96.8 (11 Nov 2023 05:30), Max: 98.4 (10 Nov 2023 19:29)  HR: 90 (11 Nov 2023 05:30) (75 - 121)  BP: 110/59 (11 Nov 2023 05:30) (99/51 - 128/69)  BP(mean): 92 (10 Nov 2023 10:53) (92 - 92)  RR: 18 (11 Nov 2023 05:30) (16 - 18)  SpO2: 95% (10 Nov 2023 15:00) (95% - 99%)    Parameters below as of 10 Nov 2023 15:10  Patient On (Oxygen Delivery Method): nasal cannula  O2 Flow (L/min): 2      PHYSICAL EXAM:  Gen: NAD, resting in bed  HEENT: Normocephalic, atraumatic  Neck: supple, no lymphadenopathy  CV: Regular rate & regular rhythm  Lungs: decreased BS at bases, no fremitus  Abdomen: Soft, BS present  Ext: Warm, well perfused  Neuro: non focal, awake  Skin: right foot dressed  Lines: no phlebitis    FH: Non-contributory  Social Hx: Non-contributory    TESTS & MEASUREMENTS:                        11.8   6.88  )-----------( 268      ( 09 Nov 2023 21:29 )             38.1     11-10    134<L>  |  100  |  11  ----------------------------<  106<H>  4.1   |  27  |  0.9    Ca    8.9      10 Nov 2023 01:56    TPro  6.5  /  Alb  3.1<L>  /  TBili  <0.2  /  DBili  x   /  AST  29  /  ALT  38  /  AlkPhos  113  11-10      LIVER FUNCTIONS - ( 10 Nov 2023 01:56 )  Alb: 3.1 g/dL / Pro: 6.5 g/dL / ALK PHOS: 113 U/L / ALT: 38 U/L / AST: 29 U/L / GGT: x           Urinalysis Basic - ( 10 Nov 2023 01:56 )    Color: x / Appearance: x / SG: x / pH: x  Gluc: 106 mg/dL / Ketone: x  / Bili: x / Urobili: x   Blood: x / Protein: x / Nitrite: x   Leuk Esterase: x / RBC: x / WBC x   Sq Epi: x / Non Sq Epi: x / Bacteria: x        Culture - Sputum (collected 10-17-23 @ 19:37)  Source: Trach Asp Tracheal Aspirate  Gram Stain (10-18-23 @ 02:39):    Numerous polymorphonuclear leukocytes per low power field    No Squamous epithelial cells per low power field    Moderate Yeast like cells seen per oil power field  Final Report (10-19-23 @ 21:17):    Normal Respiratory Mili present    Culture - Urine (collected 10-17-23 @ 19:37)  Source: Clean Catch Clean Catch (Midstream)  Final Report (10-19-23 @ 00:23):    No growth    Culture - Blood (collected 10-17-23 @ 12:35)  Source: .Blood Blood  Final Report (10-22-23 @ 22:00):    No growth at 5 days            INFECTIOUS DISEASES TESTING  Fungitell: 138 (11-07-23 @ 08:08)  Fungitell: 115 (11-02-23 @ 11:40)  Procalcitonin, Serum: 0.04 (11-02-23 @ 11:40)  Procalcitonin, Serum: 0.26 (10-24-23 @ 11:00)  MRSA PCR Result.: Negative (10-17-23 @ 17:20)  Fungitell: >500 (10-17-23 @ 17:20)  Procalcitonin, Serum: 4.36 (10-17-23 @ 17:20)  Procalcitonin, Serum: 4.18 (10-17-23 @ 12:35)  Procalcitonin, Serum: 0.07 (10-15-23 @ 07:28)  COVID-19 PCR: NotDetec (10-12-23 @ 09:14)  Procalcitonin, Serum: 0.40 (10-07-23 @ 10:54)  Legionella Antigen, Urine: Negative (09-30-23 @ 14:36)  MRSA PCR Result.: Negative (09-29-23 @ 16:50)  Procalcitonin, Serum: 9.78 (08-12-23 @ 11:25)  MRSA PCR Result.: Negative (08-12-23 @ 06:10)  Rapid RVP Result: NotDetec (08-12-23 @ 01:33)  Procalcitonin, Serum: 0.63 (08-10-23 @ 08:46)  Procalcitonin, Serum: 7.82 (08-04-23 @ 16:13)  MRSA PCR Result.: Negative (08-04-23 @ 14:52)  Rapid RVP Result: NotDetec (08-04-23 @ 03:00)      INFLAMMATORY MARKERS  Sedimentation Rate, Erythrocyte: 67 mm/Hr (10-15-23 @ 07:28)  C-Reactive Protein, Serum: 16.1 mg/L (10-15-23 @ 07:28)      RADIOLOGY & ADDITIONAL TESTS:  I have personally reviewed the last available Chest xray  CXR      CT      CARDIOLOGY TESTING  12 Lead ECG:   Ventricular Rate 67 BPM    Atrial Rate 67 BPM    P-R Interval 126 ms    QRS Duration 74 ms    Q-T Interval 428 ms    QTC Calculation(Bazett) 452 ms    P Axis 50 degrees    R Axis 38 degrees    T Axis 45 degrees    Diagnosis Line Normal sinus rhythm  Normal ECG    Confirmed by MARJAN MENDES MD ([id number]) on 11/3/2023 8:02:06 AM (11-02-23 @ 19:57)  12 Lead ECG:   Ventricular Rate 113 BPM    Atrial Rate 113 BPM    P-R Interval 118 ms    QRS Duration 62 ms    Q-T Interval 330 ms    QTC Calculation(Bazett) 452 ms    P Axis 55 degrees    R Axis 95 degrees    T Axis 33 degrees    Diagnosis Line Sinus tachycardiawith Premature supraventricular complexes and with  occasional Premature ventricular complexes with junctional escape complexes  Rightward axis  Nonspecific ST abnormality  Abnormal ECG    Confirmed by MARJAN MENDES MD ([id number]) on 11/2/2023 2:16:59 PM  (11-02-23 @ 10:42)      MEDICATIONS  acetaminophen     Tablet .. 650 Oral every 12 hours  cefepime   IVPB     cefepime   IVPB 1000 IV Intermittent every 12 hours  enoxaparin Injectable 55 SubCutaneous every 12 hours  gabapentin 600 Oral daily  lactated ringers. 1000 IV Continuous <Continuous>  levETIRAcetam 500 Oral two times a day  metroNIDAZOLE  IVPB 500 IV Intermittent every 12 hours  metroNIDAZOLE  IVPB     midodrine 10 Oral every 8 hours  multivitamin 1 Oral daily  polyethylene glycol 3350 17 Oral daily  vancomycin  IVPB 750 IV Intermittent every 24 hours      WEIGHT  Weight (kg): 55.3 (10-02-23 @ 12:00)      ANTIBIOTICS:  cefepime   IVPB 1000 milliGRAM(s) IV Intermittent every 12 hours  cefepime   IVPB      metroNIDAZOLE  IVPB 500 milliGRAM(s) IV Intermittent every 12 hours  metroNIDAZOLE  IVPB      vancomycin  IVPB 750 milliGRAM(s) IV Intermittent every 24 hours      All available historical records have been reviewed

## 2023-11-11 NOTE — PROGRESS NOTE ADULT - ASSESSMENT
ASSESSMENT   58 year old female with a PMHx of Down syndrome, nonverbal at baseline, hypothyroidism, cerebral palsy, congenital pulmonary stenosis, Seizures, presents to the ED from nursing facility for syncope associated with tonic-clonic movement witnessed by aide. Initial vitals at nursing home showed bradycardia and hypoxia for about 10-15 minutes. N    IMPRESSION  #Seizure like activity   #Right planter foot ulcers - two full thickness ulcers - serous drainage with mild erythema with OM  - MR Foot No Cont, Right (10.16.23 @ 21:51): IMPRESSION: 1.  Limited exam. 2.  Osteomyelitis of the first metatarsal stump. 3.  Osteomyelitis of the second toe distal phalanx.  - s/p excidional debridement to and including bone 1st metatarsal with partial 2nd digit amputation - 1st metatarsal head resected     #Rapid Response 10/17   #HAP   - Xray Chest 1 View- PORTABLE-Urgent (Xray Chest 1 View- PORTABLE-Urgent .) (10.17.23 @ 06:10): Worsening pneumonia, left lower lung.  - trach Cx 10/17 NG   - Procalcitonin, Serum: 4.36(10.17.23 @ 17:20)      #Elevated Fungitell   Fungitell: >500 pg/mL (10.17.23 @ 17:20)    #Buttock decubitus ulcer     #Recently Treated Multifocal PNA    #Down syndrome/Crebral Palsy   #Obesity BMI (kg/m2): 23.7, 26.3  #Abx allergy: No Known Allergies    RECOMMENDATIONS  - s/p 2nd digit amputation with grossly clean margins -- 1st metatarsal head also resected   - as area of OM seem to be resected, will plan for oral antibiotics pending Cx data from 11/10  - continue vancomycin and cefepime   - can stop flagyl after today's dose   - noted elevated fungitell which can represent colonization or false positive -- currently stable off antifungals, and would continue to monitor off antifungals    Please call or message on [employer] Teams if with any questions.  Spectra [phone]

## 2023-11-11 NOTE — PROGRESS NOTE ADULT - SUBJECTIVE AND OBJECTIVE BOX
Podiatry Progress Note    Subjective:  TEA ECHAVARRIA is a  58y Female who was seen bedside for post operative evaluation  (11 Nov 2023 13:19).   Patient is S/P Excisional Debridement of Soft Tissue and Bone (DOS: 11/10/23).  Caregiver present. States patient is doing well post operatively.   Dressing intact with no strikethrough.      PAST MEDICAL & SURGICAL HISTORY:  Down syndrome  Osteoporosis  Mild anemia  Neuropathy  S/P debridement  of R hip on 3/2/21        Objective:  Vital Signs Last 24 Hrs  T(C): 36.6 (12 Nov 2023 14:46), Max: 36.6 (12 Nov 2023 14:46)  T(F): 97.9 (12 Nov 2023 14:46), Max: 97.9 (12 Nov 2023 14:46)  HR: 97 (12 Nov 2023 14:46) (82 - 97)  BP: 115/69 (12 Nov 2023 14:46) (115/69 - 125/58)  BP(mean): --  RR: 19 (12 Nov 2023 14:46) (18 - 19)  SpO2: --                     Physical Exam - Lower Extremity Focused: R  Derm:   -Surgical incisions along the distal stump and plantar aspect appear well coapted with no signs of infection, gapping, active drainage. Mild adama erythema and edema.   Vascular: DP and PT Pulses Diminished; Foot is Warm to Warm to the touch; Capillary Refill Time < 3 Seconds;    Neuro: Protective Sensation Diminished / Moderately Neuropathic   MSK: No pain On Palpation at Wound Site     Assessment:  S/P Excisional Debridement of Soft Tissue and Bone (DOS: 11/10/23).    Plan:  Chart reviewed and Patient evaluated. All Questions and Concerns Addressed and Answered  Local Wound Care; Wound Flushed w/ NS; Wound Packed w/ Xeroform / DSD / Kerlix and Ace bandage  Weight Bearing Status; NWB;   Continue w/ Local Wound Care; Q24 Dressing Changes;  No Further Sx Debridement;   Patient Stable Per Podiatry Standpoint; Follow Up as an Outpatient w/ Dr. Everett  Discussed Plan w/ Attending;     Podiatry

## 2023-11-11 NOTE — PROGRESS NOTE ADULT - ASSESSMENT
58 year old woman with a PMHx of Down syndrome, nonverbal, WC bound and gerardo lift at baseline, hypothyroidism, cerebral palsy, congenital pulmonary stenosis, Seizures, presents to the ED from nursing facility for syncope associated with tonic-clonic movement witnessed by aide. Initial vitals at nursing home showed bradycardia and hypoxia for about 10-15 minutes. No fevers, chill, cough, vomiting, diarrhea, difficulty breathing.      Pt initial was admitted to medical floor, then on 10/17 was upgraded to ICU for respiratory distress and hypotension and now downgraded to CEU.    # Limit Labs    # sacral ulcer has healed  cont to turn/position; off load  sacral care per RN team    # Acute hypoxemic respiratory failure-2/2 aspiration pneumonia  ID noted  abx completed  fungitell + x3 -> spoke w/ ID: this was previously covered in ICU w/ caspofungin -> no further tx  aspiration precautions including hob elevation (feed with assistance)  now off O2  f/u pulm  c/w midodrine 10 q8 - (hold mido dose if BP > 140/90)  c/w MVI q24  bowel reg: miralax    # Chronic barlow for urinary retention  barlow changed 11/7  + ppt in barlow line, was better w/ IVFs  seems asymp, so no need for U/A or UCx now    # Right foot OM (1st toe amp stump and 2nd toe distal phalanx); hx multiple Lt foot deep tissue injuries (seen on xray and MRI)  f/u Pod: s/p amp thru 1st MT head and partial 2nd digit amp -> this should eradicate infection and OM  Wound care per Pod  f/u ID  was off antibiotics for better yield in bone biopsy  per ID: abx are needed until intra-op cx and path are back  abx: vanco 750mg iv q24 + cefepime 1gm iv q12 + flagyl 500mg iv q12 (stop flagyl tanja) - ID f/u noted: likely oral to complete course  ESR 10/15/23: 67  CRP 10/15/23: 16.1    # acute DVT Lt common femoral vein  resume enoxaparin 55 mg sc q12     # Seizure  abnormal VEEG  on keprra 500 mg BID   c/w neurontin 600mg q24  outpt neuro f/u    # Down Synd; CP  functional quadriplegia - pt is totally dependent on all ADLs, even eating    # DVT ppx: on therap LMWH     # GI ppx: ppi po q12    # FULL CODE    Dispo: f/u with pod; iv abx - f/u ID; resume a/c; limit labs; c/w barlow  eventually, pt will likely go back to  w/ wound care and oral abx (vs SNF) - f/u CM - still acute 58 year old woman with a PMHx of Down syndrome, nonverbal, WC bound and gerardo lift at baseline, hypothyroidism, cerebral palsy, congenital pulmonary stenosis, Seizures, presents to the ED from nursing facility for syncope associated with tonic-clonic movement witnessed by aide. Initial vitals at nursing home showed bradycardia and hypoxia for about 10-15 minutes. No fevers, chill, cough, vomiting, diarrhea, difficulty breathing.      Pt initial was admitted to medical floor, then on 10/17 was upgraded to ICU for respiratory distress and hypotension and now downgraded to CEU.    # Limit Labs    # sacral ulcer has healed  cont to turn/position; off load  sacral care per RN team    # Multiple stools, some loose  d/c miralax   f/u w/ RN staff  no need to test for c diff until off laxatives for 3 days    # Acute hypoxemic respiratory failure-2/2 aspiration pneumonia  ID noted  abx completed  fungitell + x3 -> spoke w/ ID: this was previously covered in ICU w/ caspofungin -> no further tx  aspiration precautions including hob elevation (feed with assistance)  now off O2  f/u pulm  c/w midodrine 10 q8 - (hold mido dose if BP > 140/90)  c/w MVI q24  bowel reg: miralax    # Chronic barlow for urinary retention  barlow changed 11/7  + ppt in barlow line, was better w/ IVFs  seems asymp, so no need for U/A or UCx now    # Right foot OM (1st toe amp stump and 2nd toe distal phalanx); hx multiple Lt foot deep tissue injuries (seen on xray and MRI)  f/u Pod: s/p amp thru 1st MT head and partial 2nd digit amp -> this should eradicate infection and OM  Wound care per Pod  f/u ID  was off antibiotics for better yield in bone biopsy  per ID: abx are needed until intra-op cx and path are back  abx: vanco 750mg iv q24 + cefepime 1gm iv q12 + flagyl 500mg iv q12 (stop flagyl tanja) - ID f/u noted: likely oral to complete course  ESR 10/15/23: 67  CRP 10/15/23: 16.1    # acute DVT Lt common femoral vein  resume enoxaparin 55 mg sc q12     # Seizure  abnormal VEEG  on keprra 500 mg BID   c/w neurontin 600mg q24  outpt neuro f/u    # Down Synd; CP  functional quadriplegia - pt is totally dependent on all ADLs, even eating    # DVT ppx: on therap LMWH     # GI ppx: ppi po q12    # FULL CODE    Dispo: f/u with pod; iv abx - f/u ID; resume a/c; limit labs; c/w barlow; hold bowel reg  eventually, pt will likely go back to  w/ wound care and oral abx (vs SNF) - f/u CM - still acute

## 2023-11-11 NOTE — PROGRESS NOTE ADULT - SUBJECTIVE AND OBJECTIVE BOX
JERICHO TEA  58y  Female  ***My note supersedes ALL resident notes that I sign.  My corrections for their notes are in my note.***    I can be reached directly on [employer] [phone]. My office number is [phone]. My personal cell number is [phone].    INTERVAL EVENTS: Here for f/u of foot ulcer. Pt doing OK. Does not seem to be in pain. Pt does eat/drink.    T(F): 97.8 (11-11-23 @ 14:48), Max: 98.4 (11-10-23 @ 19:29)  HR: 95 (11-11-23 @ 14:48) (90 - 121)  BP: 134/65 (11-11-23 @ 14:48) (102/59 - 134/65)  RR: 18 (11-11-23 @ 14:48) (18 - 18)  SpO2: --    Gen: NAD; + Down's facies;  HEENT: PERRL, nose clr  Neck: no nodes, no JVD, thyroid nl  lungs: clr  hrt: s1 s2 rrr no murmur  abd: soft, NT/ND, no HS megaly   (changed 11/7): + barlow w/ pale yel urine w/ much less coarse ppt; no blood  ext: no edema, no c/c  rt foot: in dressing  neuro: awake, NV and not interactive; can move arms somewhat; can't move legs much at all (they are very contracted)    LABS:                      11.8    (    80.0   6.88  )-----------( ---------      268      ( 09 Nov 2023 21:29 )             38.1    (    #SN      PT/INR - ( 09 Nov 2023 21:29 )   PT: 11.90 sec;   INR: 1.04 ratio    PTT - ( 09 Nov 2023 21:29 )  PTT: 27.3 sec    Urinalysis Basic - ( 10 Nov 2023 01:56 )    Color: x / Appearance: x / SG: x / pH: x  Gluc: 106 mg/dL / Ketone: x  / Bili: x / Urobili: x   Blood: x / Protein: x / Nitrite: x   Leuk Esterase: x / RBC: x / WBC x   Sq Epi: x / Non Sq Epi: x / Bacteria: x    RADIOLOGY & ADDITIONAL TESTS:      MEDICATIONS:  cefepime   IVPB      cefepime   IVPB 1000 milliGRAM(s) IV Intermittent every 12 hours  metroNIDAZOLE  IVPB 500 milliGRAM(s) IV Intermittent every 12 hours  metroNIDAZOLE  IVPB      vancomycin  IVPB 750 milliGRAM(s) IV Intermittent every 24 hours    acetaminophen     Tablet .. 650 milliGRAM(s) Oral every 12 hours  enoxaparin Injectable 55 milliGRAM(s) SubCutaneous every 12 hours  gabapentin 600 milliGRAM(s) Oral daily  lactated ringers. 1000 milliLiter(s) IV Continuous <Continuous>  levETIRAcetam 500 milliGRAM(s) Oral two times a day  melatonin 5 milliGRAM(s) Oral at bedtime PRN  midodrine 10 milliGRAM(s) Oral every 8 hours  multivitamin 1 Tablet(s) Oral daily  polyethylene glycol 3350 17 Gram(s) Oral daily     JERICHO TEA  58y  Female  ***My note supersedes ALL resident notes that I sign.  My corrections for their notes are in my note.***    I can be reached directly on [employer] [phone]. My office number is [phone]. My personal cell number is [phone].    INTERVAL EVENTS: Here for f/u of foot ulcer. Pt doing OK. Does not seem to be in pain. Pt does eat/drink. PCA reports multiple BMs, some loose.    T(F): 97.8 (11-11-23 @ 14:48), Max: 98.4 (11-10-23 @ 19:29)  HR: 95 (11-11-23 @ 14:48) (90 - 121)  BP: 134/65 (11-11-23 @ 14:48) (102/59 - 134/65)  RR: 18 (11-11-23 @ 14:48) (18 - 18)  SpO2: --    Gen: NAD; + Down's facies;  HEENT: PERRL, nose clr  Neck: no nodes, no JVD, thyroid nl  lungs: clr  hrt: s1 s2 rrr 1/6 sys murmur  abd: soft, NT/ND, no HS megaly   (changed 11/7): + barlow w/ pale yel urine w/ much less coarse ppt; no blood  ext: no edema, no c/c  rt foot: in dressing  neuro: awake, NV and not interactive; can move arms somewhat; can't move legs much at all (they are very contracted)    LABS:                      11.8    (    80.0   6.88  )-----------( ---------      268      ( 09 Nov 2023 21:29 )             38.1    (    #SN      PT/INR - ( 09 Nov 2023 21:29 )   PT: 11.90 sec;   INR: 1.04 ratio    PTT - ( 09 Nov 2023 21:29 )  PTT: 27.3 sec    Urinalysis Basic - ( 10 Nov 2023 01:56 )    Color: x / Appearance: x / SG: x / pH: x  Gluc: 106 mg/dL / Ketone: x  / Bili: x / Urobili: x   Blood: x / Protein: x / Nitrite: x   Leuk Esterase: x / RBC: x / WBC x   Sq Epi: x / Non Sq Epi: x / Bacteria: x    RADIOLOGY & ADDITIONAL TESTS:      MEDICATIONS:  cefepime   IVPB      cefepime   IVPB 1000 milliGRAM(s) IV Intermittent every 12 hours  metroNIDAZOLE  IVPB 500 milliGRAM(s) IV Intermittent every 12 hours  metroNIDAZOLE  IVPB      vancomycin  IVPB 750 milliGRAM(s) IV Intermittent every 24 hours    acetaminophen     Tablet .. 650 milliGRAM(s) Oral every 12 hours  enoxaparin Injectable 55 milliGRAM(s) SubCutaneous every 12 hours  gabapentin 600 milliGRAM(s) Oral daily  lactated ringers. 1000 milliLiter(s) IV Continuous <Continuous>  levETIRAcetam 500 milliGRAM(s) Oral two times a day  melatonin 5 milliGRAM(s) Oral at bedtime PRN  midodrine 10 milliGRAM(s) Oral every 8 hours  multivitamin 1 Tablet(s) Oral daily  polyethylene glycol 3350 17 Gram(s) Oral daily

## 2023-11-12 LAB
-  AMOXICILLIN/CLAVULANIC ACID: SIGNIFICANT CHANGE UP
-  AMPICILLIN/SULBACTAM: SIGNIFICANT CHANGE UP
-  AMPICILLIN: SIGNIFICANT CHANGE UP
-  AZTREONAM: SIGNIFICANT CHANGE UP
-  CEFAZOLIN: SIGNIFICANT CHANGE UP
-  CEFEPIME: SIGNIFICANT CHANGE UP
-  CEFTRIAXONE: SIGNIFICANT CHANGE UP
-  CIPROFLOXACIN: SIGNIFICANT CHANGE UP
-  ERTAPENEM: SIGNIFICANT CHANGE UP
-  GENTAMICIN: SIGNIFICANT CHANGE UP
-  LEVOFLOXACIN: SIGNIFICANT CHANGE UP
-  MEROPENEM: SIGNIFICANT CHANGE UP
-  PIPERACILLIN/TAZOBACTAM: SIGNIFICANT CHANGE UP
-  TOBRAMYCIN: SIGNIFICANT CHANGE UP
-  TRIMETHOPRIM/SULFAMETHOXAZOLE: SIGNIFICANT CHANGE UP
CULTURE RESULTS: ABNORMAL
METHOD TYPE: SIGNIFICANT CHANGE UP
ORGANISM # SPEC MICROSCOPIC CNT: ABNORMAL
ORGANISM # SPEC MICROSCOPIC CNT: SIGNIFICANT CHANGE UP
SPECIMEN SOURCE: SIGNIFICANT CHANGE UP
VANCOMYCIN TROUGH SERPL-MCNC: 5.9 UG/ML — SIGNIFICANT CHANGE UP (ref 5–10)
VANCOMYCIN TROUGH SERPL-MCNC: 5.9 UG/ML — SIGNIFICANT CHANGE UP (ref 5–10)

## 2023-11-12 PROCEDURE — 99232 SBSQ HOSP IP/OBS MODERATE 35: CPT

## 2023-11-12 RX ADMIN — ENOXAPARIN SODIUM 55 MILLIGRAM(S): 100 INJECTION SUBCUTANEOUS at 21:45

## 2023-11-12 RX ADMIN — ENOXAPARIN SODIUM 55 MILLIGRAM(S): 100 INJECTION SUBCUTANEOUS at 11:42

## 2023-11-12 RX ADMIN — LEVETIRACETAM 500 MILLIGRAM(S): 250 TABLET, FILM COATED ORAL at 05:44

## 2023-11-12 RX ADMIN — LEVETIRACETAM 500 MILLIGRAM(S): 250 TABLET, FILM COATED ORAL at 17:27

## 2023-11-12 RX ADMIN — CEFEPIME 100 MILLIGRAM(S): 1 INJECTION, POWDER, FOR SOLUTION INTRAMUSCULAR; INTRAVENOUS at 17:28

## 2023-11-12 RX ADMIN — MIDODRINE HYDROCHLORIDE 10 MILLIGRAM(S): 2.5 TABLET ORAL at 05:44

## 2023-11-12 RX ADMIN — GABAPENTIN 600 MILLIGRAM(S): 400 CAPSULE ORAL at 11:43

## 2023-11-12 RX ADMIN — Medication 650 MILLIGRAM(S): at 17:27

## 2023-11-12 RX ADMIN — Medication 1 TABLET(S): at 11:43

## 2023-11-12 RX ADMIN — CEFEPIME 100 MILLIGRAM(S): 1 INJECTION, POWDER, FOR SOLUTION INTRAMUSCULAR; INTRAVENOUS at 05:45

## 2023-11-12 RX ADMIN — Medication 250 MILLIGRAM(S): at 17:28

## 2023-11-12 RX ADMIN — MIDODRINE HYDROCHLORIDE 10 MILLIGRAM(S): 2.5 TABLET ORAL at 22:11

## 2023-11-12 RX ADMIN — MIDODRINE HYDROCHLORIDE 10 MILLIGRAM(S): 2.5 TABLET ORAL at 14:29

## 2023-11-12 RX ADMIN — Medication 100 MILLIGRAM(S): at 05:58

## 2023-11-12 RX ADMIN — Medication 650 MILLIGRAM(S): at 05:44

## 2023-11-12 NOTE — PROGRESS NOTE ADULT - SUBJECTIVE AND OBJECTIVE BOX
JERICHOTEA  58y  Female  ***My note supersedes ALL resident notes that I sign.  My corrections for their notes are in my note.***    I can be reached directly on [email or web address]. My office number is [phone]. My personal cell number is [phone].    INTERVAL EVENTS: Here for f/u of foot wound. Pt is OK, except having multiple BMs, but not watery. Pt eats well.    T(F): 97.6 (11-12-23 @ 05:04), Max: 99 (11-11-23 @ 19:10)  HR: 82 (11-12-23 @ 05:04) (82 - 100)  BP: 125/58 (11-12-23 @ 05:04) (106/59 - 134/65)  RR: 18 (11-12-23 @ 05:04) (18 - 18)  SpO2: --    Gen: NAD; + Down's facies;  HEENT: PERRL, nose clr  Neck: no nodes, no JVD, thyroid nl  lungs: clr  hrt: s1 s2 rrr 1/6 sys murmur  abd: soft, NT/ND, no HS megaly   (changed 11/7): + barlow w/ pale yel urine w/ much less coarse ppt; no blood  ext: no edema, no c/c  rt foot: in dressing  neuro: awake, NV and not interactive; can move arms somewhat; can't move legs much at all (they are very contracted)    LABS:    RADIOLOGY & ADDITIONAL TESTS:    MEDICATIONS:  cefepime   IVPB      cefepime   IVPB 1000 milliGRAM(s) IV Intermittent every 12 hours  vancomycin  IVPB 750 milliGRAM(s) IV Intermittent every 24 hours    acetaminophen     Tablet .. 650 milliGRAM(s) Oral every 12 hours  enoxaparin Injectable 55 milliGRAM(s) SubCutaneous every 12 hours  gabapentin 600 milliGRAM(s) Oral daily  levETIRAcetam 500 milliGRAM(s) Oral two times a day  melatonin 5 milliGRAM(s) Oral at bedtime PRN  midodrine 10 milliGRAM(s) Oral every 8 hours  multivitamin 1 Tablet(s) Oral daily

## 2023-11-12 NOTE — PROGRESS NOTE ADULT - ASSESSMENT
58 year old woman with a PMHx of Down syndrome, nonverbal, WC bound and gerardo lift at baseline, hypothyroidism, cerebral palsy, congenital pulmonary stenosis, Seizures, presents to the ED from nursing facility for syncope associated with tonic-clonic movement witnessed by aide. Initial vitals at nursing home showed bradycardia and hypoxia for about 10-15 minutes. No fevers, chill, cough, vomiting, diarrhea, difficulty breathing.      Pt initial was admitted to medical floor, then on 10/17 was upgraded to ICU for respiratory distress and hypotension and now downgraded to CEU.    # Limit Labs - check cbc, cmp tanja    # sacral ulcer has healed  cont to turn/position; off load  sacral care per RN team    # Multiple stools, some loose  d/c miralax   f/u w/ RN staff  no need to test for c diff until off laxatives for 3 days    # Acute hypoxemic respiratory failure-2/2 aspiration pneumonia  ID noted  abx completed  fungitell + x3 -> spoke w/ ID: this was previously covered in ICU w/ caspofungin -> no further tx  aspiration precautions including hob elevation (feed with assistance)  now off O2  f/u pulm  c/w midodrine 10 q8 - (hold mido dose if BP > 140/90)  c/w MVI q24    # Chronic barlow for urinary retention  barlow changed 11/7  + ppt in barlow line, was better w/ IVFs  seems asymp, so no need for U/A or UCx now    # Right foot OM (1st toe amp stump and 2nd toe distal phalanx); hx multiple Lt foot deep tissue injuries (seen on xray and MRI)  f/u Pod: s/p amp thru 1st MT head and partial 2nd digit amp -> this should eradicate infection and OM  Wound care per Pod  f/u ID  was off antibiotics for better yield in bone biopsy  per ID: abx are needed until intra-op cx and path are back  abx: d/c flagyl per ID; vanco 750mg iv q24 + cefepime 1gm iv q12 - ID f/u noted: likely oral to complete course  ESR 10/15/23: 67  CRP 10/15/23: 16.1  f/u vanco tr  f/u OR Cx and path    # acute DVT Lt common femoral vein  resume enoxaparin 55 mg sc q12   can use DOAC upon d/c    # Seizure  abnormal VEEG  on keprra 500 mg BID   c/w neurontin 600mg q24  outpt neuro f/u    # Down Synd; CP  functional quadriplegia - pt is totally dependent on all ADLs, even eating    # DVT ppx: on therap LMWH     # GI ppx: ppi po q12    # FULL CODE    Dispo: f/u with pod; f/u OR cx/path; iv abx - f/u ID; a/c; labs tanja; c/w barlow; hold bowel reg  eventually, pt will likely go back to  w/ wound care and oral abx (vs SNF) - f/u CM - still acute

## 2023-11-13 LAB
ALBUMIN SERPL ELPH-MCNC: 3.4 G/DL — LOW (ref 3.5–5.2)
ALBUMIN SERPL ELPH-MCNC: 3.4 G/DL — LOW (ref 3.5–5.2)
ALP SERPL-CCNC: 102 U/L — SIGNIFICANT CHANGE UP (ref 30–115)
ALP SERPL-CCNC: 102 U/L — SIGNIFICANT CHANGE UP (ref 30–115)
ALT FLD-CCNC: 18 U/L — SIGNIFICANT CHANGE UP (ref 0–41)
ALT FLD-CCNC: 18 U/L — SIGNIFICANT CHANGE UP (ref 0–41)
ANION GAP SERPL CALC-SCNC: 10 MMOL/L — SIGNIFICANT CHANGE UP (ref 7–14)
ANION GAP SERPL CALC-SCNC: 10 MMOL/L — SIGNIFICANT CHANGE UP (ref 7–14)
AST SERPL-CCNC: 17 U/L — SIGNIFICANT CHANGE UP (ref 0–41)
AST SERPL-CCNC: 17 U/L — SIGNIFICANT CHANGE UP (ref 0–41)
BASOPHILS # BLD AUTO: 0.05 K/UL — SIGNIFICANT CHANGE UP (ref 0–0.2)
BASOPHILS # BLD AUTO: 0.05 K/UL — SIGNIFICANT CHANGE UP (ref 0–0.2)
BASOPHILS NFR BLD AUTO: 0.4 % — SIGNIFICANT CHANGE UP (ref 0–1)
BASOPHILS NFR BLD AUTO: 0.4 % — SIGNIFICANT CHANGE UP (ref 0–1)
BILIRUB SERPL-MCNC: <0.2 MG/DL — SIGNIFICANT CHANGE UP (ref 0.2–1.2)
BILIRUB SERPL-MCNC: <0.2 MG/DL — SIGNIFICANT CHANGE UP (ref 0.2–1.2)
BUN SERPL-MCNC: 11 MG/DL — SIGNIFICANT CHANGE UP (ref 10–20)
BUN SERPL-MCNC: 11 MG/DL — SIGNIFICANT CHANGE UP (ref 10–20)
CALCIUM SERPL-MCNC: 8.7 MG/DL — SIGNIFICANT CHANGE UP (ref 8.4–10.4)
CALCIUM SERPL-MCNC: 8.7 MG/DL — SIGNIFICANT CHANGE UP (ref 8.4–10.4)
CHLORIDE SERPL-SCNC: 101 MMOL/L — SIGNIFICANT CHANGE UP (ref 98–110)
CHLORIDE SERPL-SCNC: 101 MMOL/L — SIGNIFICANT CHANGE UP (ref 98–110)
CO2 SERPL-SCNC: 28 MMOL/L — SIGNIFICANT CHANGE UP (ref 17–32)
CO2 SERPL-SCNC: 28 MMOL/L — SIGNIFICANT CHANGE UP (ref 17–32)
CREAT SERPL-MCNC: 0.8 MG/DL — SIGNIFICANT CHANGE UP (ref 0.7–1.5)
CREAT SERPL-MCNC: 0.8 MG/DL — SIGNIFICANT CHANGE UP (ref 0.7–1.5)
EGFR: 86 ML/MIN/1.73M2 — SIGNIFICANT CHANGE UP
EGFR: 86 ML/MIN/1.73M2 — SIGNIFICANT CHANGE UP
EOSINOPHIL # BLD AUTO: 0.4 K/UL — SIGNIFICANT CHANGE UP (ref 0–0.7)
EOSINOPHIL # BLD AUTO: 0.4 K/UL — SIGNIFICANT CHANGE UP (ref 0–0.7)
EOSINOPHIL NFR BLD AUTO: 3.6 % — SIGNIFICANT CHANGE UP (ref 0–8)
EOSINOPHIL NFR BLD AUTO: 3.6 % — SIGNIFICANT CHANGE UP (ref 0–8)
GLUCOSE SERPL-MCNC: 146 MG/DL — HIGH (ref 70–99)
GLUCOSE SERPL-MCNC: 146 MG/DL — HIGH (ref 70–99)
HCT VFR BLD CALC: 36.6 % — LOW (ref 37–47)
HCT VFR BLD CALC: 36.6 % — LOW (ref 37–47)
HGB BLD-MCNC: 11.4 G/DL — LOW (ref 12–16)
HGB BLD-MCNC: 11.4 G/DL — LOW (ref 12–16)
IMM GRANULOCYTES NFR BLD AUTO: 0.4 % — HIGH (ref 0.1–0.3)
IMM GRANULOCYTES NFR BLD AUTO: 0.4 % — HIGH (ref 0.1–0.3)
LYMPHOCYTES # BLD AUTO: 1.38 K/UL — SIGNIFICANT CHANGE UP (ref 1.2–3.4)
LYMPHOCYTES # BLD AUTO: 1.38 K/UL — SIGNIFICANT CHANGE UP (ref 1.2–3.4)
LYMPHOCYTES # BLD AUTO: 12.3 % — LOW (ref 20.5–51.1)
LYMPHOCYTES # BLD AUTO: 12.3 % — LOW (ref 20.5–51.1)
MCHC RBC-ENTMCNC: 25.6 PG — LOW (ref 27–31)
MCHC RBC-ENTMCNC: 25.6 PG — LOW (ref 27–31)
MCHC RBC-ENTMCNC: 31.1 G/DL — LOW (ref 32–37)
MCHC RBC-ENTMCNC: 31.1 G/DL — LOW (ref 32–37)
MCV RBC AUTO: 82.1 FL — SIGNIFICANT CHANGE UP (ref 81–99)
MCV RBC AUTO: 82.1 FL — SIGNIFICANT CHANGE UP (ref 81–99)
MONOCYTES # BLD AUTO: 0.4 K/UL — SIGNIFICANT CHANGE UP (ref 0.1–0.6)
MONOCYTES # BLD AUTO: 0.4 K/UL — SIGNIFICANT CHANGE UP (ref 0.1–0.6)
MONOCYTES NFR BLD AUTO: 3.6 % — SIGNIFICANT CHANGE UP (ref 1.7–9.3)
MONOCYTES NFR BLD AUTO: 3.6 % — SIGNIFICANT CHANGE UP (ref 1.7–9.3)
NEUTROPHILS # BLD AUTO: 8.92 K/UL — HIGH (ref 1.4–6.5)
NEUTROPHILS # BLD AUTO: 8.92 K/UL — HIGH (ref 1.4–6.5)
NEUTROPHILS NFR BLD AUTO: 79.7 % — HIGH (ref 42.2–75.2)
NEUTROPHILS NFR BLD AUTO: 79.7 % — HIGH (ref 42.2–75.2)
NRBC # BLD: 0 /100 WBCS — SIGNIFICANT CHANGE UP (ref 0–0)
NRBC # BLD: 0 /100 WBCS — SIGNIFICANT CHANGE UP (ref 0–0)
PLATELET # BLD AUTO: 331 K/UL — SIGNIFICANT CHANGE UP (ref 130–400)
PLATELET # BLD AUTO: 331 K/UL — SIGNIFICANT CHANGE UP (ref 130–400)
PMV BLD: 9.6 FL — SIGNIFICANT CHANGE UP (ref 7.4–10.4)
PMV BLD: 9.6 FL — SIGNIFICANT CHANGE UP (ref 7.4–10.4)
POTASSIUM SERPL-MCNC: 4.6 MMOL/L — SIGNIFICANT CHANGE UP (ref 3.5–5)
POTASSIUM SERPL-MCNC: 4.6 MMOL/L — SIGNIFICANT CHANGE UP (ref 3.5–5)
POTASSIUM SERPL-SCNC: 4.6 MMOL/L — SIGNIFICANT CHANGE UP (ref 3.5–5)
POTASSIUM SERPL-SCNC: 4.6 MMOL/L — SIGNIFICANT CHANGE UP (ref 3.5–5)
PROT SERPL-MCNC: 6.3 G/DL — SIGNIFICANT CHANGE UP (ref 6–8)
PROT SERPL-MCNC: 6.3 G/DL — SIGNIFICANT CHANGE UP (ref 6–8)
RBC # BLD: 4.46 M/UL — SIGNIFICANT CHANGE UP (ref 4.2–5.4)
RBC # BLD: 4.46 M/UL — SIGNIFICANT CHANGE UP (ref 4.2–5.4)
RBC # FLD: 28.8 % — HIGH (ref 11.5–14.5)
RBC # FLD: 28.8 % — HIGH (ref 11.5–14.5)
SODIUM SERPL-SCNC: 139 MMOL/L — SIGNIFICANT CHANGE UP (ref 135–146)
SODIUM SERPL-SCNC: 139 MMOL/L — SIGNIFICANT CHANGE UP (ref 135–146)
WBC # BLD: 11.2 K/UL — HIGH (ref 4.8–10.8)
WBC # BLD: 11.2 K/UL — HIGH (ref 4.8–10.8)
WBC # FLD AUTO: 11.2 K/UL — HIGH (ref 4.8–10.8)
WBC # FLD AUTO: 11.2 K/UL — HIGH (ref 4.8–10.8)

## 2023-11-13 PROCEDURE — 99232 SBSQ HOSP IP/OBS MODERATE 35: CPT

## 2023-11-13 RX ORDER — MEROPENEM 1 G/30ML
500 INJECTION INTRAVENOUS ONCE
Refills: 0 | Status: COMPLETED | OUTPATIENT
Start: 2023-11-13 | End: 2023-11-13

## 2023-11-13 RX ORDER — MEROPENEM 1 G/30ML
1000 INJECTION INTRAVENOUS EVERY 8 HOURS
Refills: 0 | Status: DISCONTINUED | OUTPATIENT
Start: 2023-11-13 | End: 2023-11-20

## 2023-11-13 RX ADMIN — MIDODRINE HYDROCHLORIDE 10 MILLIGRAM(S): 2.5 TABLET ORAL at 14:29

## 2023-11-13 RX ADMIN — MEROPENEM 100 MILLIGRAM(S): 1 INJECTION INTRAVENOUS at 11:17

## 2023-11-13 RX ADMIN — LEVETIRACETAM 500 MILLIGRAM(S): 250 TABLET, FILM COATED ORAL at 06:01

## 2023-11-13 RX ADMIN — Medication 650 MILLIGRAM(S): at 07:00

## 2023-11-13 RX ADMIN — CEFEPIME 100 MILLIGRAM(S): 1 INJECTION, POWDER, FOR SOLUTION INTRAMUSCULAR; INTRAVENOUS at 06:02

## 2023-11-13 RX ADMIN — MIDODRINE HYDROCHLORIDE 10 MILLIGRAM(S): 2.5 TABLET ORAL at 22:14

## 2023-11-13 RX ADMIN — MIDODRINE HYDROCHLORIDE 10 MILLIGRAM(S): 2.5 TABLET ORAL at 06:01

## 2023-11-13 RX ADMIN — ENOXAPARIN SODIUM 55 MILLIGRAM(S): 100 INJECTION SUBCUTANEOUS at 22:13

## 2023-11-13 RX ADMIN — ENOXAPARIN SODIUM 55 MILLIGRAM(S): 100 INJECTION SUBCUTANEOUS at 11:52

## 2023-11-13 RX ADMIN — LEVETIRACETAM 500 MILLIGRAM(S): 250 TABLET, FILM COATED ORAL at 17:21

## 2023-11-13 RX ADMIN — MEROPENEM 100 MILLIGRAM(S): 1 INJECTION INTRAVENOUS at 14:22

## 2023-11-13 RX ADMIN — Medication 650 MILLIGRAM(S): at 17:24

## 2023-11-13 RX ADMIN — MEROPENEM 100 MILLIGRAM(S): 1 INJECTION INTRAVENOUS at 05:58

## 2023-11-13 RX ADMIN — Medication 1 TABLET(S): at 11:54

## 2023-11-13 RX ADMIN — GABAPENTIN 600 MILLIGRAM(S): 400 CAPSULE ORAL at 11:53

## 2023-11-13 RX ADMIN — MEROPENEM 100 MILLIGRAM(S): 1 INJECTION INTRAVENOUS at 22:14

## 2023-11-13 RX ADMIN — Medication 650 MILLIGRAM(S): at 06:01

## 2023-11-13 RX ADMIN — Medication 650 MILLIGRAM(S): at 17:21

## 2023-11-13 NOTE — CHART NOTE - NSCHARTNOTESELECT_GEN_ALL_CORE
Event Note
Follow-up/Nutrition Services
Neurology/Event Note
PACU ANESTHESIA PACU ADMISSION NOTE
Transfer Note
Event Note
Follow-up/Nutrition Services
Nutrition - RD Follow Up/Event Note
Rapid Response
TF recs
Transfer Note

## 2023-11-13 NOTE — PROGRESS NOTE ADULT - ASSESSMENT
58 year old female with a PMHx of Down syndrome, nonverbal at baseline, hypothyroidism, cerebral palsy, Seizures, presents to the ED from nursing facility for syncope associated with tonic-clonic movement witnessed by aide. Initial vitals at nursing home showed bradycardia and hypoxia for about 10-15 minutes. No fevers, chill, cough, vomiting, diarrhea, difficulty breathing.    Pt initial was admitted to medical floor then on 10/17 was upgraded to ICU for respiratory distress and hypotension and now downgraded to CEU.    #Right foot (1st toe stump and 2nd distal phalanx) OM and multiple L foot deep tissue injuries (seen on xray and MRI)  - Patient underwent excisional debridement of ulcer of right foot (including 1st metatarsal) and partial amputation 2nd toe   - Cx grew Proteus ESBL and started on Meropenem 1g q8h IV, will insert midline today to prepare for discharge  - Wound care following     #Acute hypoxemic respiratory failure-2/2 Aspiration pneumonia  -aggressive pulm suctioning and chest PT--aspiration precautions including hob elevation (feed with assistance)  -frequent turning/off loading and position change as per protocol with local wound/skin care  -respiratory status improving  - tried on weaning off on the oxygen ; patient has been stable   - S/p meropenem and caspofungin   -fungitell + x3 -> spoke w/ ID: this was previously covered in ICU w/ caspofungin -> no further tx    #DVT-nonocclusive of L common femoral vein  - on enoxaparin 55 mg subcut q12 hours starting for 8 pm ( 11/10) as per surgery     #Seizure- abnormal VEEG  - on keprra 500 mg BID   -monitor neuro checks    #Chronic barlow for urinary retention;  - Barlow changed on 11/07 due to leak   - Precipitate seen in barlow, added IVFs for better hydration: LR 75/hr  - The urine looks better on 11/10    DVT: starting on lovenox 55 mg sq q 12 hours since 8 pm ; cleared by surgery   guarded prognosis  FULL CODE    Dispo: Group Home ([facility]) eventually

## 2023-11-13 NOTE — CHART NOTE - NSCHARTNOTEFT_GEN_A_CORE
Chart reviewed.     Wound Cx growing ESBL proteus mirabilis.     Stop vanco/cefepime --> narrow to meropenem 1g q 8 hours.     Awaiting path, but as gross margins obtained, would plan 7 days of carbapenem therapy -- ertapenem 1g daily if outpatient course needed (11/13-11/19)    Please call or message on [employer] if with any questions.  Spectra [phone]

## 2023-11-13 NOTE — PROGRESS NOTE ADULT - SUBJECTIVE AND OBJECTIVE BOX
24H events:    Patient is a 58y old Female who presents with a chief complaint of Pre-syncope (12 Nov 2023 13:19)    Primary diagnosis of Pre-syncope    Today is hospital day 30d. This morning patient was seen and examined at bedside, resting comfortably in bed.    No acute or major events overnight.      PAST MEDICAL & SURGICAL HISTORY  Down syndrome    Osteoporosis    Mild anemia    Neuropathy    S/P debridement  of R hip on 3/2/21      SOCIAL HISTORY:  Social History:  Lives at nursing home (14 Oct 2023 20:33)      ALLERGIES:  No Known Allergies    MEDICATIONS:  STANDING MEDICATIONS  acetaminophen     Tablet .. 650 milliGRAM(s) Oral every 12 hours  enoxaparin Injectable 55 milliGRAM(s) SubCutaneous every 12 hours  gabapentin 600 milliGRAM(s) Oral daily  levETIRAcetam 500 milliGRAM(s) Oral two times a day  meropenem  IVPB 1000 milliGRAM(s) IV Intermittent every 8 hours  midodrine 10 milliGRAM(s) Oral every 8 hours  multivitamin 1 Tablet(s) Oral daily    PRN MEDICATIONS  melatonin 5 milliGRAM(s) Oral at bedtime PRN    VITALS:   T(F): 98.5  HR: 95  BP: 92/45  RR: 18  SpO2: --    PHYSICAL EXAM:  GENERAL:   ( X ) NAD, lying in bed comfortably     (  ) obtunded     (  ) lethargic     (  ) somnolent    HEAD:   ( X ) Atraumatic     (  ) hematoma     (  ) laceration (specify location:       )     NECK:  ( X ) Supple     (  ) neck stiffness     (  ) nuchal rigidity     (  )  no JVD     (  ) JVD present ( -- cm)    HEART:  Rate -->     ( X ) normal rate     (  ) bradycardic     (  ) tachycardic  Rhythm -->     ( X ) regular     (  ) regularly irregular     (  ) irregularly irregular  Murmurs -->     ( X ) normal s1s2     (  ) systolic murmur     (  ) diastolic murmur     (  ) continuous murmur      (  ) S3 present     (  ) S4 present    LUNGS:   (  )Unlabored respirations     (  ) tachypnea  (  ) B/L air entry     (  ) decreased breath sounds in:  (location     )    (  ) no adventitious sound     (  ) crackles     (  ) wheezing      (  ) rhonchi      (specify location:       )  (  ) chest wall tenderness (specify location:       )    ABDOMEN:   ( X ) Soft     (  ) tense   |   (  ) nondistended     (  ) distended   |   (  ) +BS     (  ) hypoactive bowel sounds     (  ) hyperactive bowel sounds  ( X ) nontender     (  ) RUQ tenderness     (  ) RLQ tenderness     (  ) LLQ tenderness     (  ) epigastric tenderness     (  ) diffuse tenderness  (  ) Splenomegaly      (  ) Hepatomegaly      (  ) Jaundice     (  ) ecchymosis     EXTREMITIES:  ( X ) Normal     (  ) Rash     (  ) ecchymosis     (  ) varicose veins      (  ) pitting edema     (  ) non-pitting edema   (  ) ulceration     (  ) gangrene:     (location:     ) S/P Debridement    NERVOUS SYSTEM:    Non-verbal    LABS:                        11.4   11.20 )-----------( 331      ( 13 Nov 2023 07:42 )             36.6     11-13    139  |  101  |  11  ----------------------------<  146<H>  4.6   |  28  |  0.8    Ca    8.7      13 Nov 2023 07:42    TPro  6.3  /  Alb  3.4<L>  /  TBili  <0.2  /  DBili  x   /  AST  17  /  ALT  18  /  AlkPhos  102  11-13      Urinalysis Basic - ( 13 Nov 2023 07:42 )    Color: x / Appearance: x / SG: x / pH: x  Gluc: 146 mg/dL / Ketone: x  / Bili: x / Urobili: x   Blood: x / Protein: x / Nitrite: x   Leuk Esterase: x / RBC: x / WBC x   Sq Epi: x / Non Sq Epi: x / Bacteria: x

## 2023-11-14 LAB
ANION GAP SERPL CALC-SCNC: 12 MMOL/L — SIGNIFICANT CHANGE UP (ref 7–14)
ANION GAP SERPL CALC-SCNC: 12 MMOL/L — SIGNIFICANT CHANGE UP (ref 7–14)
BUN SERPL-MCNC: 12 MG/DL — SIGNIFICANT CHANGE UP (ref 10–20)
BUN SERPL-MCNC: 12 MG/DL — SIGNIFICANT CHANGE UP (ref 10–20)
CALCIUM SERPL-MCNC: 8.5 MG/DL — SIGNIFICANT CHANGE UP (ref 8.4–10.5)
CALCIUM SERPL-MCNC: 8.5 MG/DL — SIGNIFICANT CHANGE UP (ref 8.4–10.5)
CHLORIDE SERPL-SCNC: 102 MMOL/L — SIGNIFICANT CHANGE UP (ref 98–110)
CHLORIDE SERPL-SCNC: 102 MMOL/L — SIGNIFICANT CHANGE UP (ref 98–110)
CO2 SERPL-SCNC: 26 MMOL/L — SIGNIFICANT CHANGE UP (ref 17–32)
CO2 SERPL-SCNC: 26 MMOL/L — SIGNIFICANT CHANGE UP (ref 17–32)
CREAT SERPL-MCNC: 0.7 MG/DL — SIGNIFICANT CHANGE UP (ref 0.7–1.5)
CREAT SERPL-MCNC: 0.7 MG/DL — SIGNIFICANT CHANGE UP (ref 0.7–1.5)
EGFR: 101 ML/MIN/1.73M2 — SIGNIFICANT CHANGE UP
EGFR: 101 ML/MIN/1.73M2 — SIGNIFICANT CHANGE UP
GLUCOSE SERPL-MCNC: 120 MG/DL — HIGH (ref 70–99)
GLUCOSE SERPL-MCNC: 120 MG/DL — HIGH (ref 70–99)
HCT VFR BLD CALC: 36.2 % — LOW (ref 37–47)
HCT VFR BLD CALC: 36.2 % — LOW (ref 37–47)
HGB BLD-MCNC: 11.1 G/DL — LOW (ref 12–16)
HGB BLD-MCNC: 11.1 G/DL — LOW (ref 12–16)
MAGNESIUM SERPL-MCNC: 1.9 MG/DL — SIGNIFICANT CHANGE UP (ref 1.8–2.4)
MAGNESIUM SERPL-MCNC: 1.9 MG/DL — SIGNIFICANT CHANGE UP (ref 1.8–2.4)
MCHC RBC-ENTMCNC: 25.3 PG — LOW (ref 27–31)
MCHC RBC-ENTMCNC: 25.3 PG — LOW (ref 27–31)
MCHC RBC-ENTMCNC: 30.7 G/DL — LOW (ref 32–37)
MCHC RBC-ENTMCNC: 30.7 G/DL — LOW (ref 32–37)
MCV RBC AUTO: 82.5 FL — SIGNIFICANT CHANGE UP (ref 81–99)
MCV RBC AUTO: 82.5 FL — SIGNIFICANT CHANGE UP (ref 81–99)
NRBC # BLD: 0 /100 WBCS — SIGNIFICANT CHANGE UP (ref 0–0)
NRBC # BLD: 0 /100 WBCS — SIGNIFICANT CHANGE UP (ref 0–0)
PLATELET # BLD AUTO: 371 K/UL — SIGNIFICANT CHANGE UP (ref 130–400)
PLATELET # BLD AUTO: 371 K/UL — SIGNIFICANT CHANGE UP (ref 130–400)
PMV BLD: 9.5 FL — SIGNIFICANT CHANGE UP (ref 7.4–10.4)
PMV BLD: 9.5 FL — SIGNIFICANT CHANGE UP (ref 7.4–10.4)
POTASSIUM SERPL-MCNC: 4.1 MMOL/L — SIGNIFICANT CHANGE UP (ref 3.5–5)
POTASSIUM SERPL-MCNC: 4.1 MMOL/L — SIGNIFICANT CHANGE UP (ref 3.5–5)
POTASSIUM SERPL-SCNC: 4.1 MMOL/L — SIGNIFICANT CHANGE UP (ref 3.5–5)
POTASSIUM SERPL-SCNC: 4.1 MMOL/L — SIGNIFICANT CHANGE UP (ref 3.5–5)
RBC # BLD: 4.39 M/UL — SIGNIFICANT CHANGE UP (ref 4.2–5.4)
RBC # BLD: 4.39 M/UL — SIGNIFICANT CHANGE UP (ref 4.2–5.4)
RBC # FLD: 28.9 % — HIGH (ref 11.5–14.5)
RBC # FLD: 28.9 % — HIGH (ref 11.5–14.5)
SODIUM SERPL-SCNC: 140 MMOL/L — SIGNIFICANT CHANGE UP (ref 135–146)
SODIUM SERPL-SCNC: 140 MMOL/L — SIGNIFICANT CHANGE UP (ref 135–146)
WBC # BLD: 9.16 K/UL — SIGNIFICANT CHANGE UP (ref 4.8–10.8)
WBC # BLD: 9.16 K/UL — SIGNIFICANT CHANGE UP (ref 4.8–10.8)
WBC # FLD AUTO: 9.16 K/UL — SIGNIFICANT CHANGE UP (ref 4.8–10.8)
WBC # FLD AUTO: 9.16 K/UL — SIGNIFICANT CHANGE UP (ref 4.8–10.8)

## 2023-11-14 PROCEDURE — 99232 SBSQ HOSP IP/OBS MODERATE 35: CPT

## 2023-11-14 RX ADMIN — MIDODRINE HYDROCHLORIDE 10 MILLIGRAM(S): 2.5 TABLET ORAL at 05:50

## 2023-11-14 RX ADMIN — MEROPENEM 100 MILLIGRAM(S): 1 INJECTION INTRAVENOUS at 21:34

## 2023-11-14 RX ADMIN — MIDODRINE HYDROCHLORIDE 10 MILLIGRAM(S): 2.5 TABLET ORAL at 21:33

## 2023-11-14 RX ADMIN — MEROPENEM 100 MILLIGRAM(S): 1 INJECTION INTRAVENOUS at 11:20

## 2023-11-14 RX ADMIN — LEVETIRACETAM 500 MILLIGRAM(S): 250 TABLET, FILM COATED ORAL at 05:50

## 2023-11-14 RX ADMIN — Medication 650 MILLIGRAM(S): at 06:45

## 2023-11-14 RX ADMIN — ENOXAPARIN SODIUM 55 MILLIGRAM(S): 100 INJECTION SUBCUTANEOUS at 11:19

## 2023-11-14 RX ADMIN — Medication 1 TABLET(S): at 11:20

## 2023-11-14 RX ADMIN — Medication 650 MILLIGRAM(S): at 05:51

## 2023-11-14 RX ADMIN — GABAPENTIN 600 MILLIGRAM(S): 400 CAPSULE ORAL at 11:20

## 2023-11-14 RX ADMIN — Medication 650 MILLIGRAM(S): at 17:31

## 2023-11-14 RX ADMIN — ENOXAPARIN SODIUM 55 MILLIGRAM(S): 100 INJECTION SUBCUTANEOUS at 21:33

## 2023-11-14 RX ADMIN — Medication 650 MILLIGRAM(S): at 17:28

## 2023-11-14 RX ADMIN — MEROPENEM 100 MILLIGRAM(S): 1 INJECTION INTRAVENOUS at 05:49

## 2023-11-14 RX ADMIN — LEVETIRACETAM 500 MILLIGRAM(S): 250 TABLET, FILM COATED ORAL at 17:28

## 2023-11-14 RX ADMIN — MIDODRINE HYDROCHLORIDE 10 MILLIGRAM(S): 2.5 TABLET ORAL at 11:19

## 2023-11-14 NOTE — PROGRESS NOTE ADULT - TIME BILLING
I have personally seen and examined this patient.    I have reviewed all pertinent clinical information and reviewed all relevant imaging and diagnostic studies personally.   I counseled the patient about diagnostic testing and treatment plan. All questions were answered.   I discussed recommendations with the primary team.
time spent on review of labs, imaging studies, old records, obtaining history, personally examining patient, entering orders for medications/tests/etc, discussions with other health care providers, documentation in electronic health records, independent interpretation of labs, imaging/procedure results and care coordination.
I have personally seen and examined this patient.    I have reviewed all pertinent clinical information and reviewed all relevant imaging and diagnostic studies personally.   I counseled the patient about diagnostic testing and treatment plan. All questions were answered.   I discussed recommendations with the primary team.
I have personally seen and examined this patient.    I have reviewed all pertinent clinical information and reviewed all relevant imaging and diagnostic studies personally.   I counseled the patient about diagnostic testing and treatment plan. All questions were answered.   I discussed recommendations with the primary team.
time spent on review of labs, imaging studies, old records, obtaining history, personally examining patient, entering orders for medications/tests/etc, discussions with other health care providers, documentation in electronic health records, independent interpretation of labs, imaging/procedure results and care coordination.
I have personally seen and examined this patient.    I have reviewed all pertinent clinical information and reviewed all relevant imaging and diagnostic studies personally.   I counseled the patient about diagnostic testing and treatment plan. All questions were answered.   I discussed recommendations with the primary team.
time spent on review of labs, imaging studies, old records, obtaining history, personally examining patient, entering orders for medications/tests/etc, discussions with other health care providers, documentation in electronic health records, independent interpretation of labs, imaging/procedure results and care coordination.
I have personally seen and examined this patient.    I have reviewed all pertinent clinical information and reviewed all relevant imaging and diagnostic studies personally.   I counseled the patient about diagnostic testing and treatment plan. All questions were answered.   I discussed recommendations with the primary team.
time spent on review of labs, imaging studies, old records, obtaining history, personally examining patient, entering orders for medications/tests/etc, discussions with other health care providers, documentation in electronic health records, independent interpretation of labs, imaging/procedure results and care coordination.

## 2023-11-14 NOTE — PROCEDURE NOTE - NSINDICATIONS_GEN_A_CORE
critical illness/emergency venous access/venous access
antibiotic therapy
critical patient/monitoring purposes

## 2023-11-14 NOTE — PROGRESS NOTE ADULT - SUBJECTIVE AND OBJECTIVE BOX
JERICHOTEA  58y, Female  Allergy: No Known Allergies      LOS  31d    CHIEF COMPLAINT: Pre-syncope (14 Nov 2023 16:07)      INTERVAL EVENTS/HPI  - No acute events overnight  - T(F): , Max: 98.9 (11-14-23 @ 20:41)  - Denies any worsening symptoms  - Tolerating medication  - WBC Count: 9.16 (11-14-23 @ 06:37)  WBC Count: 11.20 (11-13-23 @ 07:42)     - Creatinine: 0.7 (11-14-23 @ 06:37)  Creatinine: 0.8 (11-13-23 @ 07:42)       ROS  General: Denies rigors, nightsweats  HEENT: Denies headache, rhinorrhea, sore throat, eye pain  CV: Denies CP, palpitations  PULM: Denies wheezing, hemoptysis  GI: Denies hematemesis, hematochezia, melena  : Denies discharge, hematuria  MSK: Denies arthralgias, myalgias  SKIN: Denies rash, lesions  NEURO: Denies paresthesias, weakness  PSYCH: Denies depression, anxiety    VITALS:  T(F): 98.9, Max: 98.9 (11-14-23 @ 20:41)  HR: 84  BP: 102/53  RR: 18Vital Signs Last 24 Hrs  T(C): 37.2 (14 Nov 2023 20:41), Max: 37.2 (14 Nov 2023 20:41)  T(F): 98.9 (14 Nov 2023 20:41), Max: 98.9 (14 Nov 2023 20:41)  HR: 84 (14 Nov 2023 20:41) (84 - 94)  BP: 102/53 (14 Nov 2023 20:41) (102/53 - 118/56)  BP(mean): 77 (14 Nov 2023 12:00) (77 - 77)  RR: 18 (14 Nov 2023 20:41) (18 - 18)  SpO2: --        PHYSICAL EXAM:  Gen: NAD, resting in bed  HEENT: Normocephalic, atraumatic  Neck: supple, no lymphadenopathy  CV: Regular rate & regular rhythm  Lungs: decreased BS at bases, no fremitus  Abdomen: Soft, BS present  Ext: Warm, well perfused  Neuro: non focal, awake  Skin: no rash, no erythema  Lines: no phlebitis    FH: Non-contributory  Social Hx: Non-contributory    TESTS & MEASUREMENTS:                        11.1   9.16  )-----------( 371      ( 14 Nov 2023 06:37 )             36.2     11-14    140  |  102  |  12  ----------------------------<  120<H>  4.1   |  26  |  0.7    Ca    8.5      14 Nov 2023 06:37  Mg     1.9     11-14    TPro  6.3  /  Alb  3.4<L>  /  TBili  <0.2  /  DBili  x   /  AST  17  /  ALT  18  /  AlkPhos  102  11-13      LIVER FUNCTIONS - ( 13 Nov 2023 07:42 )  Alb: 3.4 g/dL / Pro: 6.3 g/dL / ALK PHOS: 102 U/L / ALT: 18 U/L / AST: 17 U/L / GGT: x           Urinalysis Basic - ( 14 Nov 2023 06:37 )    Color: x / Appearance: x / SG: x / pH: x  Gluc: 120 mg/dL / Ketone: x  / Bili: x / Urobili: x   Blood: x / Protein: x / Nitrite: x   Leuk Esterase: x / RBC: x / WBC x   Sq Epi: x / Non Sq Epi: x / Bacteria: x        Culture - Acid Fast - Other w/Smear (collected 11-10-23 @ 12:48)  Source: .Other None    Culture - Other (collected 11-10-23 @ 12:48)  Source: .Other None  Final Report (11-12-23 @ 18:42):    Few Proteus mirabilis ESBL  Organism: Proteus mirabilis ESBL (11-12-23 @ 18:42)  Organism: Proteus mirabilis ESBL (11-12-23 @ 18:42)      Method Type: ZANE      -  Amoxicillin/Clavulanic Acid: S <=8/4      -  Ampicillin: R >16 These ampicillin results predict results for amoxicillin      -  Ampicillin/Sulbactam: R <=4/2      -  Aztreonam: R <=4      -  Cefazolin: R >16      -  Cefepime: R >16      -  Ceftriaxone: R >32      -  Ciprofloxacin: R >2      -  Ertapenem: S <=0.5      -  Gentamicin: S <=2      -  Levofloxacin: R >4      -  Meropenem: S <=1      -  Piperacillin/Tazobactam: R <=8      -  Tobramycin: S <=2      -  Trimethoprim/Sulfamethoxazole: R >2/38    Culture - Acid Fast - Other w/Smear (collected 11-10-23 @ 12:40)  Source: .Other None    Culture - Other (collected 11-10-23 @ 12:40)  Source: .Other None  Final Report (11-12-23 @ 18:41):    Few Proteus mirabilis ESBL  Organism: Proteus mirabilis ESBL (11-12-23 @ 18:41)  Organism: Proteus mirabilis ESBL (11-12-23 @ 18:41)      Method Type: ZANE      -  Amoxicillin/Clavulanic Acid: S <=8/4      -  Ampicillin: R >16 These ampicillin results predict results for amoxicillin      -  Ampicillin/Sulbactam: R <=4/2      -  Aztreonam: R <=4      -  Cefazolin: R >16      -  Cefepime: R 4      -  Ceftriaxone: R >32      -  Ciprofloxacin: R >2      -  Ertapenem: S <=0.5      -  Gentamicin: S <=2      -  Levofloxacin: R >4      -  Meropenem: S <=1      -  Piperacillin/Tazobactam: R <=8      -  Tobramycin: S <=2      -  Trimethoprim/Sulfamethoxazole: R >2/38    Culture - Sputum (collected 10-17-23 @ 19:37)  Source: Trach Asp Tracheal Aspirate  Gram Stain (10-18-23 @ 02:39):    Numerous polymorphonuclear leukocytes per low power field    No Squamous epithelial cells per low power field    Moderate Yeast like cells seen per oil power field  Final Report (10-19-23 @ 21:17):    Normal Respiratory Mili present    Culture - Urine (collected 10-17-23 @ 19:37)  Source: Clean Catch Clean Catch (Midstream)  Final Report (10-19-23 @ 00:23):    No growth    Culture - Blood (collected 10-17-23 @ 12:35)  Source: .Blood Blood  Final Report (10-22-23 @ 22:00):    No growth at 5 days            INFECTIOUS DISEASES TESTING  Fungitell: 138 (11-07-23 @ 08:08)  Fungitell: 115 (11-02-23 @ 11:40)  Procalcitonin, Serum: 0.04 (11-02-23 @ 11:40)  Procalcitonin, Serum: 0.26 (10-24-23 @ 11:00)  MRSA PCR Result.: Negative (10-17-23 @ 17:20)  Fungitell: >500 (10-17-23 @ 17:20)  Procalcitonin, Serum: 4.36 (10-17-23 @ 17:20)  Procalcitonin, Serum: 4.18 (10-17-23 @ 12:35)  Procalcitonin, Serum: 0.07 (10-15-23 @ 07:28)  COVID-19 PCR: NotDetec (10-12-23 @ 09:14)  Procalcitonin, Serum: 0.40 (10-07-23 @ 10:54)  Legionella Antigen, Urine: Negative (09-30-23 @ 14:36)  MRSA PCR Result.: Negative (09-29-23 @ 16:50)  Procalcitonin, Serum: 9.78 (08-12-23 @ 11:25)  MRSA PCR Result.: Negative (08-12-23 @ 06:10)  Rapid RVP Result: NotDetec (08-12-23 @ 01:33)  Procalcitonin, Serum: 0.63 (08-10-23 @ 08:46)  Procalcitonin, Serum: 7.82 (08-04-23 @ 16:13)  MRSA PCR Result.: Negative (08-04-23 @ 14:52)  Rapid RVP Result: NotDetec (08-04-23 @ 03:00)      INFLAMMATORY MARKERS  Sedimentation Rate, Erythrocyte: 67 mm/Hr (10-15-23 @ 07:28)  C-Reactive Protein, Serum: 16.1 mg/L (10-15-23 @ 07:28)      RADIOLOGY & ADDITIONAL TESTS:  I have personally reviewed the last available Chest xray  CXR      CT      CARDIOLOGY TESTING  12 Lead ECG:   Ventricular Rate 67 BPM    Atrial Rate 67 BPM    P-R Interval 126 ms    QRS Duration 74 ms    Q-T Interval 428 ms    QTC Calculation(Bazett) 452 ms    P Axis 50 degrees    R Axis 38 degrees    T Axis 45 degrees    Diagnosis Line Normal sinus rhythm  Normal ECG    Confirmed by MARJAN MENDES MD ([id number]) on 11/3/2023 8:02:06 AM (11-02-23 @ 19:57)  12 Lead ECG:   Ventricular Rate 113 BPM    Atrial Rate 113 BPM    P-R Interval 118 ms    QRS Duration 62 ms    Q-T Interval 330 ms    QTC Calculation(Bazett) 452 ms    P Axis 55 degrees    R Axis 95 degrees    T Axis 33 degrees    Diagnosis Line Sinus tachycardiawith Premature supraventricular complexes and with  occasional Premature ventricular complexes with junctional escape complexes  Rightward axis  Nonspecific ST abnormality  Abnormal ECG    Confirmed by MARJAN MENDES MD ([id number]) on 11/2/2023 2:16:59 PM  (11-02-23 @ 10:42)      MEDICATIONS  acetaminophen     Tablet .. 650 Oral every 12 hours  enoxaparin Injectable 55 SubCutaneous every 12 hours  gabapentin 600 Oral daily  levETIRAcetam 500 Oral two times a day  meropenem  IVPB 1000 IV Intermittent every 8 hours  midodrine 10 Oral every 8 hours  multivitamin 1 Oral daily      WEIGHT  Weight (kg): 55.3 (10-02-23 @ 12:00)  Creatinine: 0.7 mg/dL (11-14-23 @ 06:37)      ANTIBIOTICS:  meropenem  IVPB 1000 milliGRAM(s) IV Intermittent every 8 hours      All available historical records have been reviewed

## 2023-11-14 NOTE — PROGRESS NOTE ADULT - ASSESSMENT
ASSESSMENT   58 year old female with a PMHx of Down syndrome, nonverbal at baseline, hypothyroidism, cerebral palsy, congenital pulmonary stenosis, Seizures, presents to the ED from nursing facility for syncope associated with tonic-clonic movement witnessed by aide. Initial vitals at nursing home showed bradycardia and hypoxia for about 10-15 minutes. N    IMPRESSION  #Seizure like activity   #Right planter foot ulcers - two full thickness ulcers - serous drainage with mild erythema with OM  - MR Foot No Cont, Right (10.16.23 @ 21:51): IMPRESSION: 1.  Limited exam. 2.  Osteomyelitis of the first metatarsal stump. 3.  Osteomyelitis of the second toe distal phalanx.  - s/p excidional debridement to and including bone 1st metatarsal with partial 2nd digit amputation - 1st metatarsal head resected - Wound Cx Proteus ESBL       #Rapid Response 10/17   #HAP   - Xray Chest 1 View- PORTABLE-Urgent (Xray Chest 1 View- PORTABLE-Urgent .) (10.17.23 @ 06:10): Worsening pneumonia, left lower lung.  - trach Cx 10/17 NG   - Procalcitonin, Serum: 4.36(10.17.23 @ 17:20)      #Elevated Fungitell   Fungitell: >500 pg/mL (10.17.23 @ 17:20)    #Buttock decubitus ulcer     #Recently Treated Multifocal PNA    #Down syndrome/Crebral Palsy   #Obesity BMI (kg/m2): 23.7, 26.3  #Abx allergy: No Known Allergies    RECOMMENDATIONS  - s/p 2nd digit amputation with grossly clean margins -- 1st metatarsal head also resected   - ertapenem 1g daily until 11/19  - follow-up path    Please call or message on [employer] Teams if with any questions.  Spectra [id number]

## 2023-11-14 NOTE — PROGRESS NOTE ADULT - SUBJECTIVE AND OBJECTIVE BOX
24H events:    Patient is a 58y old Female who presents with a chief complaint of Pre-syncope (13 Nov 2023 15:34)    Primary diagnosis of Pre-syncope    Today is hospital day 31d. This morning patient was seen and examined at bedside, resting comfortably in bed.    No acute or major events overnight.    PAST MEDICAL & SURGICAL HISTORY  Down syndrome    Osteoporosis    Mild anemia    Neuropathy    S/P debridement  of R hip on 3/2/21      SOCIAL HISTORY:  Social History:  Lives at nursing home (14 Oct 2023 20:33)      ALLERGIES:  No Known Allergies    MEDICATIONS:  STANDING MEDICATIONS  acetaminophen     Tablet .. 650 milliGRAM(s) Oral every 12 hours  enoxaparin Injectable 55 milliGRAM(s) SubCutaneous every 12 hours  gabapentin 600 milliGRAM(s) Oral daily  levETIRAcetam 500 milliGRAM(s) Oral two times a day  meropenem  IVPB 1000 milliGRAM(s) IV Intermittent every 8 hours  midodrine 10 milliGRAM(s) Oral every 8 hours  multivitamin 1 Tablet(s) Oral daily    PRN MEDICATIONS  melatonin 5 milliGRAM(s) Oral at bedtime PRN    VITALS:   T(F): 98.1  HR: 94  BP: 104/64  RR: 18  SpO2: 94%    PHYSICAL EXAM:  GENERAL:   ( X ) NAD, lying in bed comfortably     (  ) obtunded     (  ) lethargic     (  ) somnolent    HEAD:   ( X ) Atraumatic     (  ) hematoma     (  ) laceration (specify location:       )     NECK:  ( X ) Supple     (  ) neck stiffness     (  ) nuchal rigidity     (  )  no JVD     (  ) JVD present ( -- cm)    HEART:  Rate -->     ( X ) normal rate     (  ) bradycardic     (  ) tachycardic  Rhythm -->     ( X ) regular     (  ) regularly irregular     (  ) irregularly irregular  Murmurs -->     ( X ) normal s1s2     (  ) systolic murmur     (  ) diastolic murmur     (  ) continuous murmur      (  ) S3 present     (  ) S4 present    LUNGS:   ( X )Unlabored respirations     (  ) tachypnea  ( X ) B/L air entry     (  ) decreased breath sounds in:  (location     )    ( X ) no adventitious sound     (  ) crackles     (  ) wheezing      (  ) rhonchi      (specify location:       )  (  ) chest wall tenderness (specify location:       )    ABDOMEN:   ( X ) Soft     (  ) tense   |   (  ) nondistended     (  ) distended   |   (  ) +BS     (  ) hypoactive bowel sounds     (  ) hyperactive bowel sounds  ( X ) nontender     (  ) RUQ tenderness     (  ) RLQ tenderness     (  ) LLQ tenderness     (  ) epigastric tenderness     (  ) diffuse tenderness  (  ) Splenomegaly      (  ) Hepatomegaly      (  ) Jaundice     (  ) ecchymosis     EXTREMITIES:  ( X ) Normal     (  ) Rash     (  ) ecchymosis     (  ) varicose veins      (  ) pitting edema     (  ) non-pitting edema   (  ) ulceration     (  ) gangrene:     (location: Clean wounds  )    NERVOUS SYSTEM:    Non-verbal       LABS:                        11.1   9.16  )-----------( 371      ( 14 Nov 2023 06:37 )             36.2     11-14    140  |  102  |  12  ----------------------------<  120<H>  4.1   |  26  |  0.7    Ca    8.5      14 Nov 2023 06:37  Mg     1.9     11-14    TPro  6.3  /  Alb  3.4<L>  /  TBili  <0.2  /  DBili  x   /  AST  17  /  ALT  18  /  AlkPhos  102  11-13      Urinalysis Basic - ( 14 Nov 2023 06:37 )    Color: x / Appearance: x / SG: x / pH: x  Gluc: 120 mg/dL / Ketone: x  / Bili: x / Urobili: x   Blood: x / Protein: x / Nitrite: x   Leuk Esterase: x / RBC: x / WBC x   Sq Epi: x / Non Sq Epi: x / Bacteria: x

## 2023-11-14 NOTE — PROGRESS NOTE ADULT - ASSESSMENT
58 year old female with a PMHx of Down syndrome, nonverbal at baseline, hypothyroidism, cerebral palsy, Seizures, presents to the ED from nursing facility for syncope associated with tonic-clonic movement witnessed by aide. Initial vitals at nursing home showed bradycardia and hypoxia for about 10-15 minutes. No fevers, chill, cough, vomiting, diarrhea, difficulty breathing.    Pt initial was admitted to medical floor then on 10/17 was upgraded to ICU for respiratory distress and hypotension and now downgraded to CEU.    #Right foot (1st toe stump and 2nd distal phalanx) OM and multiple L foot deep tissue injuries (seen on xray and MRI)  - Patient underwent excisional debridement of ulcer of right foot (including 1st metatarsal) and partial amputation 2nd toe   - Cx grew Proteus ESBL and started on Meropenem 1g q8h IV, midline inserted to prepare for discharge (will most likely complete course before leaving)  - Wound care following  - No further debridement as per podiatry, fu as op    #Acute hypoxemic respiratory failure-2/2 Aspiration pneumonia  -aggressive pulm suctioning and chest PT--aspiration precautions including hob elevation (feed with assistance)  -frequent turning/off loading and position change as per protocol with local wound/skin care  -respiratory status improving  - tried on weaning off on the oxygen ; patient has been stable   - S/p meropenem and caspofungin   -fungitell + x3 -> spoke w/ ID: this was previously covered in ICU w/ caspofungin -> no further tx    #DVT-nonocclusive of L common femoral vein  - on enoxaparin 55 mg subcut q12 hours starting for 8 pm ( 11/10) as per surgery     #Seizure- abnormal VEEG  - on keprra 500 mg BID   -monitor neuro checks    #Chronic barlow for urinary retention, removed  - Patient does not have barlow at group home  - TOV successful on 11/14    DVT: starting on lovenox 55 mg sq q 12 hours since 8 pm ; cleared by surgery   guarded prognosis  FULL CODE    Dispo: Group Home ([facility]) eventually

## 2023-11-15 LAB
SURGICAL PATHOLOGY STUDY: SIGNIFICANT CHANGE UP
SURGICAL PATHOLOGY STUDY: SIGNIFICANT CHANGE UP

## 2023-11-15 PROCEDURE — 99232 SBSQ HOSP IP/OBS MODERATE 35: CPT

## 2023-11-15 RX ORDER — CHLORHEXIDINE GLUCONATE 213 G/1000ML
1 SOLUTION TOPICAL
Refills: 0 | Status: DISCONTINUED | OUTPATIENT
Start: 2023-11-15 | End: 2023-11-20

## 2023-11-15 RX ADMIN — ENOXAPARIN SODIUM 55 MILLIGRAM(S): 100 INJECTION SUBCUTANEOUS at 21:56

## 2023-11-15 RX ADMIN — MIDODRINE HYDROCHLORIDE 10 MILLIGRAM(S): 2.5 TABLET ORAL at 05:12

## 2023-11-15 RX ADMIN — Medication 650 MILLIGRAM(S): at 05:12

## 2023-11-15 RX ADMIN — Medication 1 TABLET(S): at 11:56

## 2023-11-15 RX ADMIN — MIDODRINE HYDROCHLORIDE 10 MILLIGRAM(S): 2.5 TABLET ORAL at 13:13

## 2023-11-15 RX ADMIN — Medication 650 MILLIGRAM(S): at 17:06

## 2023-11-15 RX ADMIN — MEROPENEM 100 MILLIGRAM(S): 1 INJECTION INTRAVENOUS at 05:11

## 2023-11-15 RX ADMIN — LEVETIRACETAM 500 MILLIGRAM(S): 250 TABLET, FILM COATED ORAL at 05:12

## 2023-11-15 RX ADMIN — LEVETIRACETAM 500 MILLIGRAM(S): 250 TABLET, FILM COATED ORAL at 17:07

## 2023-11-15 RX ADMIN — Medication 650 MILLIGRAM(S): at 06:16

## 2023-11-15 RX ADMIN — GABAPENTIN 600 MILLIGRAM(S): 400 CAPSULE ORAL at 11:56

## 2023-11-15 RX ADMIN — CHLORHEXIDINE GLUCONATE 1 APPLICATION(S): 213 SOLUTION TOPICAL at 11:57

## 2023-11-15 RX ADMIN — MEROPENEM 100 MILLIGRAM(S): 1 INJECTION INTRAVENOUS at 13:12

## 2023-11-15 RX ADMIN — MEROPENEM 100 MILLIGRAM(S): 1 INJECTION INTRAVENOUS at 21:58

## 2023-11-15 RX ADMIN — MIDODRINE HYDROCHLORIDE 10 MILLIGRAM(S): 2.5 TABLET ORAL at 21:55

## 2023-11-15 RX ADMIN — Medication 1 DROP(S): at 21:57

## 2023-11-15 RX ADMIN — ENOXAPARIN SODIUM 55 MILLIGRAM(S): 100 INJECTION SUBCUTANEOUS at 10:25

## 2023-11-15 NOTE — PROGRESS NOTE ADULT - ASSESSMENT
58 year old woman with a PMHx of Down syndrome, nonverbal, WC bound and gerardo lift at baseline, hypothyroidism, cerebral palsy, congenital pulmonary stenosis, Seizures, presents to the ED from nursing facility for syncope associated with tonic-clonic movement witnessed by aide. Initial vitals at nursing home showed bradycardia and hypoxia for about 10-15 minutes. No fevers, chill, cough, vomiting, diarrhea, difficulty breathing.      Pt initial was admitted to medical floor, then on 10/17 was upgraded to ICU for respiratory distress and hypotension and now downgraded to CEU.    # Limit Labs     # sacral ulcer has healed  cont to turn/position; off load  sacral care per RN team    # Multiple stools, some loose  d/c miralax - getting better  f/u w/ RN staff  no need to test for c diff until off laxatives for 3 days    # Acute hypoxemic respiratory failure-2/2 aspiration pneumonia  abx completed  fungitell + x3 -> spoke w/ ID: this was previously covered in ICU w/ caspofungin -> no further tx  aspiration precautions including hob elevation (feed with assistance)  now off O2  c/w midodrine 10 q8 - (hold mido dose if BP > 140/90)  c/w MVI q24    # urinary retention  barlow changed 11/7  barlow now removed - passing TOV    # Right foot OM (1st toe amp stump and 2nd toe distal phalanx); hx multiple Lt foot deep tissue injuries (seen on xray and MRI)  f/u Pod: s/p amp thru 1st MT head and partial 2nd digit amp -> this should eradicate infection and OM  Wound care per Pod  f/u ID  abx: danna 1gm iv q8 til 11/19 - needs to stay in hospital (cannot get to NH w/out long screening process for [facility] pt w/ Down's - will be easier to complete abx here)  ESR 10/15/23: 67  CRP 10/15/23: 16.1  OR Cx: ESBL Prot Mirab  OR path: clean margins w/out OM    # acute DVT Lt common femoral vein  resume enoxaparin 55 mg sc q12   can use DOAC upon d/c    # Seizure  abnormal VEEG  on keprra 500 mg BID   c/w neurontin 600mg q24  outpt neuro f/u    # Down Synd; CP  functional quadriplegia - pt is totally dependent on all ADLs, even eating (but eats well)    # DVT ppx: on therap LMWH     # GI ppx: ppi po q12    # FULL CODE    Dispo: f/u with pod; iv abx thru SUN - f/u ID; a/c;    mtg Fri at 1pm  eventually, pt will likely go back to  w/ wound care on MON - f/u CM - still acute

## 2023-11-15 NOTE — PROGRESS NOTE ADULT - SUBJECTIVE AND OBJECTIVE BOX
JERICHO TEA  58y  Female  ***My note supersedes ALL resident notes that I sign.  My corrections for their notes are in my note.***    I can be reached directly on [id number]. My office number is [phone]. My personal cell number is [phone].    INTERVAL EVENTS: Here for f/u of foot OM. Pt doing OK. Non-verbal. Needs iv abx in hosp til SUN. Pt passing TOV.    T(F): 97.1 (11-15-23 @ 05:01), Max: 98.9 (11-14-23 @ 20:41)  HR: 110 (11-15-23 @ 13:30) (78 - 110)  BP: 107/57 (11-15-23 @ 13:30) (102/53 - 107/57)  RR: 18 (11-15-23 @ 13:30) (18 - 18)  SpO2: 94% (11-14-23 @ 19:26) (94% - 94%)    Gen: NAD; + Down's facies;  HEENT: PERRL, nose clr  Neck: no nodes, no JVD, thyroid nl  lungs: clr  hrt: s1 s2 rrr 1/6 sys murmur  abd: soft, NT/ND, no HS megaly  : barlow out  ext: no edema, no c/c  rt foot: in dressing  neuro: awake, NV and not interactive; can move arms somewhat; can't move legs much at all (they are very contracted)    LABS:                      11.1    (    82.5   9.16  )-----------( ---------      371      ( 14 Nov 2023 06:37 )             36.2    (    28.9     Urinalysis Basic - ( 14 Nov 2023 06:37 )    Color: x / Appearance: x / SG: x / pH: x  Gluc: 120 mg/dL / Ketone: x  / Bili: x / Urobili: x   Blood: x / Protein: x / Nitrite: x   Leuk Esterase: x / RBC: x / WBC x   Sq Epi: x / Non Sq Epi: x / Bacteria: x    Culture - Other (collected 11-10-23 @ 12:48)  Source: .Other None  Final Report (11-12-23 @ 18:42):    Few Proteus mirabilis ESBL  Organism: Proteus mirabilis ESBL (11-12-23 @ 18:42)  Organism: Proteus mirabilis ESBL (11-12-23 @ 18:42)      -  Levofloxacin: R >4      -  Tobramycin: S <=2      -  Aztreonam: R <=4      -  Gentamicin: S <=2      -  Cefazolin: R >16      -  Cefepime: R >16      -  Piperacillin/Tazobactam: R <=8      -  Ciprofloxacin: R >2      -  Ceftriaxone: R >32      -  Ampicillin: R >16 These ampicillin results predict results for amoxicillin      Method Type: ZANE      -  Meropenem: S <=1      -  Ampicillin/Sulbactam: R <=4/2      -  Amoxicillin/Clavulanic Acid: S <=8/4      -  Trimethoprim/Sulfamethoxazole: R >2/38      -  Ertapenem: S <=0.5    Culture - Other (collected 11-10-23 @ 12:40)  Source: .Other None  Final Report (11-12-23 @ 18:41):    Few Proteus mirabilis ESBL  Organism: Proteus mirabilis ESBL (11-12-23 @ 18:41)  Organism: Proteus mirabilis ESBL (11-12-23 @ 18:41)      -  Levofloxacin: R >4      -  Tobramycin: S <=2      -  Aztreonam: R <=4      -  Gentamicin: S <=2      -  Cefazolin: R >16      -  Cefepime: R 4      -  Piperacillin/Tazobactam: R <=8      -  Ciprofloxacin: R >2      -  Ceftriaxone: R >32      -  Ampicillin: R >16 These ampicillin results predict results for amoxicillin      Method Type: ZANE      -  Meropenem: S <=1      -  Ampicillin/Sulbactam: R <=4/2      -  Amoxicillin/Clavulanic Acid: S <=8/4      -  Trimethoprim/Sulfamethoxazole: R >2/38      -  Ertapenem: S <=0.5    RADIOLOGY & ADDITIONAL TESTS:    MEDICATIONS:  meropenem  IVPB 1000 milliGRAM(s) IV Intermittent every 8 hours    acetaminophen     Tablet .. 650 milliGRAM(s) Oral every 12 hours  chlorhexidine 2% Cloths 1 Application(s) Topical <User Schedule>  enoxaparin Injectable 55 milliGRAM(s) SubCutaneous every 12 hours  gabapentin 600 milliGRAM(s) Oral daily  levETIRAcetam 500 milliGRAM(s) Oral two times a day  melatonin 5 milliGRAM(s) Oral at bedtime PRN  midodrine 10 milliGRAM(s) Oral every 8 hours  multivitamin 1 Tablet(s) Oral daily

## 2023-11-15 NOTE — PROGRESS NOTE ADULT - SUBJECTIVE AND OBJECTIVE BOX
24H events:    Patient is a 58y old Female who presents with a chief complaint of Pre-syncope (14 Nov 2023 20:51)    Primary diagnosis of Pre-syncope    Today is hospital day 32d. This morning patient was seen and examined at bedside, resting comfortably in bed.    No acute or major events overnight.      PAST MEDICAL & SURGICAL HISTORY  Down syndrome    Osteoporosis    Mild anemia    Neuropathy    S/P debridement  of R hip on 3/2/21      SOCIAL HISTORY:  Social History:  Lives at nursing home (14 Oct 2023 20:33)      ALLERGIES:  No Known Allergies    MEDICATIONS:  STANDING MEDICATIONS  acetaminophen     Tablet .. 650 milliGRAM(s) Oral every 12 hours  chlorhexidine 2% Cloths 1 Application(s) Topical <User Schedule>  enoxaparin Injectable 55 milliGRAM(s) SubCutaneous every 12 hours  gabapentin 600 milliGRAM(s) Oral daily  levETIRAcetam 500 milliGRAM(s) Oral two times a day  meropenem  IVPB 1000 milliGRAM(s) IV Intermittent every 8 hours  midodrine 10 milliGRAM(s) Oral every 8 hours  multivitamin 1 Tablet(s) Oral daily    PRN MEDICATIONS  melatonin 5 milliGRAM(s) Oral at bedtime PRN    VITALS:   T(F): 97.1  HR: 110  BP: 107/57  RR: 18  SpO2: 94%    PHYSICAL EXAM:  GENERAL:   ( X ) NAD, lying in bed comfortably     (  ) obtunded     (  ) lethargic     (  ) somnolent    HEAD:   ( X ) Atraumatic     (  ) hematoma     (  ) laceration (specify location:       )     NECK:  ( X ) Supple     (  ) neck stiffness     (  ) nuchal rigidity     (  )  no JVD     (  ) JVD present ( -- cm)    HEART:  Rate -->     ( X ) normal rate     (  ) bradycardic     (  ) tachycardic  Rhythm -->     ( X ) regular     (  ) regularly irregular     (  ) irregularly irregular  Murmurs -->     ( X ) normal s1s2     (  ) systolic murmur     (  ) diastolic murmur     (  ) continuous murmur      (  ) S3 present     (  ) S4 present    LUNGS:   ( X )Unlabored respirations     (  ) tachypnea  ( X ) B/L air entry     (  ) decreased breath sounds in:  (location     )    ( X ) no adventitious sound     (  ) crackles     (  ) wheezing      (  ) rhonchi      (specify location:       )  (  ) chest wall tenderness (specify location:       )    ABDOMEN:   ( X ) Soft     (  ) tense   |   (  ) nondistended     (  ) distended   |   (  ) +BS     (  ) hypoactive bowel sounds     (  ) hyperactive bowel sounds  ( X ) nontender     (  ) RUQ tenderness     (  ) RLQ tenderness     (  ) LLQ tenderness     (  ) epigastric tenderness     (  ) diffuse tenderness  (  ) Splenomegaly      (  ) Hepatomegaly      (  ) Jaundice     (  ) ecchymosis     EXTREMITIES:  ( X ) Normal     (  ) Rash     (  ) ecchymosis     (  ) varicose veins      (  ) pitting edema     (  ) non-pitting edema   (  ) ulceration     (  ) gangrene:     (location:     ) S/P debridement     NERVOUS SYSTEM:    Non-verbal    LABS:                        11.1   9.16  )-----------( 371      ( 14 Nov 2023 06:37 )             36.2     11-14    140  |  102  |  12  ----------------------------<  120<H>  4.1   |  26  |  0.7    Ca    8.5      14 Nov 2023 06:37  Mg     1.9     11-14        Urinalysis Basic - ( 14 Nov 2023 06:37 )    Color: x / Appearance: x / SG: x / pH: x  Gluc: 120 mg/dL / Ketone: x  / Bili: x / Urobili: x   Blood: x / Protein: x / Nitrite: x   Leuk Esterase: x / RBC: x / WBC x   Sq Epi: x / Non Sq Epi: x / Bacteria: x

## 2023-11-16 PROCEDURE — 99232 SBSQ HOSP IP/OBS MODERATE 35: CPT

## 2023-11-16 RX ORDER — APIXABAN 2.5 MG/1
5 TABLET, FILM COATED ORAL EVERY 12 HOURS
Refills: 0 | Status: DISCONTINUED | OUTPATIENT
Start: 2023-11-16 | End: 2023-11-20

## 2023-11-16 RX ADMIN — CHLORHEXIDINE GLUCONATE 1 APPLICATION(S): 213 SOLUTION TOPICAL at 05:24

## 2023-11-16 RX ADMIN — Medication 650 MILLIGRAM(S): at 05:17

## 2023-11-16 RX ADMIN — Medication 1 DROP(S): at 05:25

## 2023-11-16 RX ADMIN — Medication 1 DROP(S): at 18:07

## 2023-11-16 RX ADMIN — MEROPENEM 100 MILLIGRAM(S): 1 INJECTION INTRAVENOUS at 05:23

## 2023-11-16 RX ADMIN — MEROPENEM 100 MILLIGRAM(S): 1 INJECTION INTRAVENOUS at 13:09

## 2023-11-16 RX ADMIN — MIDODRINE HYDROCHLORIDE 10 MILLIGRAM(S): 2.5 TABLET ORAL at 21:16

## 2023-11-16 RX ADMIN — Medication 650 MILLIGRAM(S): at 18:08

## 2023-11-16 RX ADMIN — APIXABAN 5 MILLIGRAM(S): 2.5 TABLET, FILM COATED ORAL at 18:11

## 2023-11-16 RX ADMIN — MIDODRINE HYDROCHLORIDE 10 MILLIGRAM(S): 2.5 TABLET ORAL at 05:17

## 2023-11-16 RX ADMIN — LEVETIRACETAM 500 MILLIGRAM(S): 250 TABLET, FILM COATED ORAL at 05:17

## 2023-11-16 RX ADMIN — LEVETIRACETAM 500 MILLIGRAM(S): 250 TABLET, FILM COATED ORAL at 18:08

## 2023-11-16 RX ADMIN — ENOXAPARIN SODIUM 55 MILLIGRAM(S): 100 INJECTION SUBCUTANEOUS at 08:42

## 2023-11-16 RX ADMIN — MIDODRINE HYDROCHLORIDE 10 MILLIGRAM(S): 2.5 TABLET ORAL at 13:10

## 2023-11-16 RX ADMIN — MEROPENEM 100 MILLIGRAM(S): 1 INJECTION INTRAVENOUS at 21:15

## 2023-11-16 NOTE — PROGRESS NOTE ADULT - SUBJECTIVE AND OBJECTIVE BOX
JERICHO TEA  58y  Female  ***My note supersedes ALL resident notes that I sign.  My corrections for their notes are in my note.***    I can be reached directly on [email or web address]. My office number is [phone]. My personal cell number is [phone].    INTERVAL EVENTS: Here for f/u of foot OM. Pt the same, no change. ON abx til SUN.    T(F): 97.2 (11-16-23 @ 12:55), Max: 98.9 (11-15-23 @ 19:50)  HR: 107 (11-16-23 @ 12:55) (74 - 107)  BP: 106/59 (11-16-23 @ 12:55) (91/54 - 111/58)  RR: 18 (11-16-23 @ 12:55) (18 - 18)  SpO2: --    Gen: NAD; + Down's facies;  HEENT: PERRL, nose clr  Neck: no nodes, no JVD, thyroid nl  lungs: clr  hrt: s1 s2 rrr 1/6 sys murmur  abd: soft, NT/ND, no HS megaly  : barlow out  ext: no edema, no c/c  rt foot: in dressing  neuro: awake, NV and not interactive; can move arms very limitedly; can't move legs much at all (they are very contracted)    LABS:  Culture - Other (collected 11-10-23 @ 12:48)  Source: .Other None  Final Report (11-12-23 @ 18:42):    Few Proteus mirabilis ESBL  Organism: Proteus mirabilis ESBL (11-12-23 @ 18:42)  Organism: Proteus mirabilis ESBL (11-12-23 @ 18:42)      -  Levofloxacin: R >4      -  Tobramycin: S <=2      -  Aztreonam: R <=4      -  Gentamicin: S <=2      -  Cefazolin: R >16      -  Cefepime: R >16      -  Piperacillin/Tazobactam: R <=8      -  Ciprofloxacin: R >2      -  Ceftriaxone: R >32      -  Ampicillin: R >16 These ampicillin results predict results for amoxicillin      Method Type: ZANE      -  Meropenem: S <=1      -  Ampicillin/Sulbactam: R <=4/2      -  Amoxicillin/Clavulanic Acid: S <=8/4      -  Trimethoprim/Sulfamethoxazole: R >2/38      -  Ertapenem: S <=0.5    Culture - Other (collected 11-10-23 @ 12:40)  Source: .Other None  Final Report (11-12-23 @ 18:41):    Few Proteus mirabilis ESBL  Organism: Proteus mirabilis ESBL (11-12-23 @ 18:41)  Organism: Proteus mirabilis ESBL (11-12-23 @ 18:41)      -  Levofloxacin: R >4      -  Tobramycin: S <=2      -  Aztreonam: R <=4      -  Gentamicin: S <=2      -  Cefazolin: R >16      -  Cefepime: R 4      -  Piperacillin/Tazobactam: R <=8      -  Ciprofloxacin: R >2      -  Ceftriaxone: R >32      -  Ampicillin: R >16 These ampicillin results predict results for amoxicillin      Method Type: ZANE      -  Meropenem: S <=1      -  Ampicillin/Sulbactam: R <=4/2      -  Amoxicillin/Clavulanic Acid: S <=8/4      -  Trimethoprim/Sulfamethoxazole: R >2/38      -  Ertapenem: S <=0.5    RADIOLOGY & ADDITIONAL TESTS:    MEDICATIONS:  meropenem  IVPB 1000 milliGRAM(s) IV Intermittent every 8 hours    acetaminophen     Tablet .. 650 milliGRAM(s) Oral every 12 hours  apixaban 5 milliGRAM(s) Oral every 12 hours  artificial  tears Solution 1 Drop(s) Both EYES two times a day  chlorhexidine 2% Cloths 1 Application(s) Topical <User Schedule>  gabapentin 600 milliGRAM(s) Oral daily  levETIRAcetam 500 milliGRAM(s) Oral two times a day  melatonin 5 milliGRAM(s) Oral at bedtime PRN  midodrine 10 milliGRAM(s) Oral every 8 hours  multivitamin 1 Tablet(s) Oral daily

## 2023-11-16 NOTE — PROGRESS NOTE ADULT - ASSESSMENT
58 year old female with a PMHx of Down syndrome, nonverbal at baseline, hypothyroidism, cerebral palsy, Seizures, presents to the ED from nursing facility for syncope associated with tonic-clonic movement witnessed by aide. Initial vitals at nursing home showed bradycardia and hypoxia for about 10-15 minutes. No fevers, chill, cough, vomiting, diarrhea, difficulty breathing.    Pt initial was admitted to medical floor then on 10/17 was upgraded to ICU for respiratory distress and hypotension and now downgraded to CEU.    #Right foot (1st toe stump and 2nd distal phalanx) OM and multiple L foot deep tissue injuries (seen on xray and MRI)  - Patient underwent excisional debridement of ulcer of right foot (including 1st metatarsal) and partial amputation 2nd toe   - Cx grew Proteus ESBL and started on Meropenem 1g q8h IV, until 11/19, will complete course before leaving.   - Wound care following  - No further debridement as per podiatry, fu as op    #Acute hypoxemic respiratory failure-2/2 Aspiration pneumonia  -aggressive pulm suctioning and chest PT--aspiration precautions including hob elevation (feed with assistance)  -frequent turning/off loading and position change as per protocol with local wound/skin care  -respiratory status improving  - tried on weaning off on the oxygen ; patient has been stable   - S/p meropenem and caspofungin   -fungitell + x3 -> spoke w/ ID: this was previously covered in ICU w/ caspofungin -> no further tx    #DVT-nonocclusive of L common femoral vein  - on enoxaparin 55 mg subcut q12 hours starting for 8 pm ( 11/10) as per surgery     #Seizure- abnormal VEEG  - on keprra 500 mg BID   - monitor neuro checks    #Chronic barlow for urinary retention, removed  - Patient does not have barlow at group home  - TOV successful on 11/14    DVT: started on lovenox 55 mg sq q 12 hours, switched to Eliquis 5 mg PO BID on 11/16   guarded prognosis  FULL CODE    Dispo: Group Home ([facility]) eventually

## 2023-11-16 NOTE — PROGRESS NOTE ADULT - SUBJECTIVE AND OBJECTIVE BOX
24H events:    Patient is a 58y old Female who presents with a chief complaint of Pre-syncope (16 Nov 2023 14:11)    Primary diagnosis of Pre-syncope    Today is hospital day 33d. This morning patient was seen and examined at bedside, resting comfortably in bed.    No acute or major events overnight.      PAST MEDICAL & SURGICAL HISTORY  Down syndrome    Osteoporosis    Mild anemia    Neuropathy    S/P debridement  of R hip on 3/2/21      SOCIAL HISTORY:  Social History:  Lives at nursing home (14 Oct 2023 20:33)      ALLERGIES:  No Known Allergies    MEDICATIONS:  STANDING MEDICATIONS  acetaminophen     Tablet .. 650 milliGRAM(s) Oral every 12 hours  apixaban 5 milliGRAM(s) Oral every 12 hours  artificial  tears Solution 1 Drop(s) Both EYES two times a day  chlorhexidine 2% Cloths 1 Application(s) Topical <User Schedule>  gabapentin 600 milliGRAM(s) Oral daily  levETIRAcetam 500 milliGRAM(s) Oral two times a day  meropenem  IVPB 1000 milliGRAM(s) IV Intermittent every 8 hours  midodrine 10 milliGRAM(s) Oral every 8 hours  multivitamin 1 Tablet(s) Oral daily    PRN MEDICATIONS  melatonin 5 milliGRAM(s) Oral at bedtime PRN    VITALS:   T(F): 97.2  HR: 107  BP: 106/59  RR: 18  SpO2: --    PHYSICAL EXAM:  GENERAL:   ( X ) NAD, lying in bed comfortably     (  ) obtunded     (  ) lethargic     (  ) somnolent    HEAD:   ( X ) Atraumatic     (  ) hematoma     (  ) laceration (specify location:       )     NECK:  ( X ) Supple     (  ) neck stiffness     (  ) nuchal rigidity     (  )  no JVD     (  ) JVD present ( -- cm)    HEART:  Rate -->     ( X ) normal rate     (  ) bradycardic     (  ) tachycardic  Rhythm -->     ( X ) regular     (  ) regularly irregular     (  ) irregularly irregular  Murmurs -->     ( X ) normal s1s2     (  ) systolic murmur     (  ) diastolic murmur     (  ) continuous murmur      (  ) S3 present     (  ) S4 present    LUNGS:   ( X )Unlabored respirations     (  ) tachypnea  ( X ) B/L air entry     (  ) decreased breath sounds in:  (location     )    ( X ) no adventitious sound     (  ) crackles     (  ) wheezing      (  ) rhonchi      (specify location:       )  (  ) chest wall tenderness (specify location:       )    ABDOMEN:   ( X ) Soft     (  ) tense   |   (  ) nondistended     (  ) distended   |   (  ) +BS     (  ) hypoactive bowel sounds     (  ) hyperactive bowel sounds  ( X ) nontender     (  ) RUQ tenderness     (  ) RLQ tenderness     (  ) LLQ tenderness     (  ) epigastric tenderness     (  ) diffuse tenderness  (  ) Splenomegaly      (  ) Hepatomegaly      (  ) Jaundice     (  ) ecchymosis     EXTREMITIES:  ( X ) Normal     (  ) Rash     (  ) ecchymosis     (  ) varicose veins      (  ) pitting edema     (  ) non-pitting edema   (  ) ulceration     (  ) gangrene:     (location: S/P Debridement   )    NERVOUS SYSTEM:    Non-Verbal     LABS:

## 2023-11-16 NOTE — PROGRESS NOTE ADULT - ASSESSMENT
58 year old woman with a PMHx of Down syndrome, nonverbal, WC bound and gerardo lift at baseline, hypothyroidism, cerebral palsy, congenital pulmonary stenosis, Seizures, presents to the ED from nursing facility for syncope associated with tonic-clonic movement witnessed by aide. Initial vitals at nursing home showed bradycardia and hypoxia for about 10-15 minutes. No fevers, chill, cough, vomiting, diarrhea, difficulty breathing.      Pt initial was admitted to medical floor, then on 10/17 was upgraded to ICU for respiratory distress and hypotension and now downgraded to CEU.    # Limit Labs     # sacral ulcer has healed  cont to turn/position; off load  sacral care per RN team    # prior loose BMs; resolved  d/c miralax - getting better    # Acute hypoxemic respiratory failure-2/2 aspiration pneumonia  abx completed  fungitell + x3 -> spoke w/ ID: this was previously covered in ICU w/ caspofungin -> no further tx  aspiration precautions including hob elevation (feed with assistance)  now off O2  c/w midodrine 10 q8 - (hold mido dose if BP > 140/90)  c/w MVI q24    # urinary retention  barlow changed 11/7  barlow now removed - passing TOV    # Right foot OM (1st toe amp stump and 2nd toe distal phalanx); hx multiple Lt foot deep tissue injuries (seen on xray and MRI)  f/u Pod: s/p amp thru 1st MT head and partial 2nd digit amp -> this should eradicate infection and OM  Wound care per Pod  f/u ID  abx: danna 1gm iv q8 til 11/19 - needs to stay in hospital (cannot get to NH w/out long screening process for [facility] pt w/ Down's - will be easier to complete abx here)  ESR 10/15/23: 67  CRP 10/15/23: 16.1  OR Cx: ESBL Prot Mirab  OR path: clean margins w/out OM    # acute DVT Lt common femoral vein  d/c enoxaparin 55 mg sc q12   resume Eliquis 5mg po q12    # Seizure  abnormal VEEG  on keprra 500 mg BID   c/w neurontin 600mg q24  outpt neuro f/u    # Down Synd; CP  functional quadriplegia - pt is totally dependent on all ADLs, even eating (but eats well)    # DVT ppx: on DOAC     # GI ppx: ppi po q12    # FULL CODE    Dispo: f/u with pod; iv abx thru SUN - f/u ID; a/c;    mt Fri at 1pm  eventually, pt will likely go back to  w/ wound care on MON (11/20) - f/u CM - still acute

## 2023-11-17 PROCEDURE — 99232 SBSQ HOSP IP/OBS MODERATE 35: CPT

## 2023-11-17 RX ADMIN — CHLORHEXIDINE GLUCONATE 1 APPLICATION(S): 213 SOLUTION TOPICAL at 05:53

## 2023-11-17 RX ADMIN — MEROPENEM 100 MILLIGRAM(S): 1 INJECTION INTRAVENOUS at 05:38

## 2023-11-17 RX ADMIN — APIXABAN 5 MILLIGRAM(S): 2.5 TABLET, FILM COATED ORAL at 05:52

## 2023-11-17 RX ADMIN — MIDODRINE HYDROCHLORIDE 10 MILLIGRAM(S): 2.5 TABLET ORAL at 13:32

## 2023-11-17 RX ADMIN — LEVETIRACETAM 500 MILLIGRAM(S): 250 TABLET, FILM COATED ORAL at 05:41

## 2023-11-17 RX ADMIN — Medication 1 TABLET(S): at 11:44

## 2023-11-17 RX ADMIN — APIXABAN 5 MILLIGRAM(S): 2.5 TABLET, FILM COATED ORAL at 17:07

## 2023-11-17 RX ADMIN — MIDODRINE HYDROCHLORIDE 10 MILLIGRAM(S): 2.5 TABLET ORAL at 21:12

## 2023-11-17 RX ADMIN — MEROPENEM 100 MILLIGRAM(S): 1 INJECTION INTRAVENOUS at 21:14

## 2023-11-17 RX ADMIN — GABAPENTIN 600 MILLIGRAM(S): 400 CAPSULE ORAL at 11:44

## 2023-11-17 RX ADMIN — MEROPENEM 100 MILLIGRAM(S): 1 INJECTION INTRAVENOUS at 13:31

## 2023-11-17 RX ADMIN — Medication 650 MILLIGRAM(S): at 05:41

## 2023-11-17 RX ADMIN — Medication 1 DROP(S): at 17:08

## 2023-11-17 RX ADMIN — Medication 650 MILLIGRAM(S): at 17:07

## 2023-11-17 RX ADMIN — LEVETIRACETAM 500 MILLIGRAM(S): 250 TABLET, FILM COATED ORAL at 17:08

## 2023-11-17 RX ADMIN — Medication 1 DROP(S): at 05:53

## 2023-11-17 RX ADMIN — MIDODRINE HYDROCHLORIDE 10 MILLIGRAM(S): 2.5 TABLET ORAL at 05:41

## 2023-11-17 NOTE — PROGRESS NOTE ADULT - SUBJECTIVE AND OBJECTIVE BOX
JERICHOTEA  58y  Female  ***My note supersedes ALL resident notes that I sign.  My corrections for their notes are in my note.***    I can be reached directly on [id number]. My office number is [phone]. My personal cell number is [phone].    INTERVAL EVENTS: Here for f/u of foot infection. non-verbal. no events. eats/drinks great. no issues w/ diarrhea.    T(F): 96.9 (11-17-23 @ 05:18), Max: 97.5 (11-16-23 @ 21:20)  HR: 101 (11-17-23 @ 14:00) (79 - 101)  BP: 97/54 (11-17-23 @ 14:00) (86/48 - 119/68)  RR: 18 (11-17-23 @ 14:00) (18 - 18)  SpO2: --    Gen: NAD; + Down's facies;  HEENT: PERRL, nose clr  Neck: no nodes, no JVD, thyroid nl  lungs: clr  hrt: s1 s2 rrr 1/6 sys murmur  abd: soft, NT/ND, no HS megaly  : barlow out  ext: no edema, no c/c  rt foot: in dressing  neuro: awake, NV and not interactive; can move arms very limitedly; can't move legs much at all (they are very contracted)    LABS:    RADIOLOGY & ADDITIONAL TESTS:    MEDICATIONS:  meropenem  IVPB 1000 milliGRAM(s) IV Intermittent every 8 hours    acetaminophen     Tablet .. 650 milliGRAM(s) Oral every 12 hours  apixaban 5 milliGRAM(s) Oral every 12 hours  artificial  tears Solution 1 Drop(s) Both EYES two times a day  chlorhexidine 2% Cloths 1 Application(s) Topical <User Schedule>  gabapentin 600 milliGRAM(s) Oral daily  levETIRAcetam 500 milliGRAM(s) Oral two times a day  melatonin 5 milliGRAM(s) Oral at bedtime PRN  midodrine 10 milliGRAM(s) Oral every 8 hours  multivitamin 1 Tablet(s) Oral daily

## 2023-11-17 NOTE — PROGRESS NOTE ADULT - SUBJECTIVE AND OBJECTIVE BOX
24H events:    Patient is a 58y old Female who presents with a chief complaint of Pre-syncope (17 Nov 2023 15:41)    Primary diagnosis of Pre-syncope    Today is hospital day 34d. This morning patient was seen and examined at bedside, resting comfortably in bed.    No acute or major events overnight.      PAST MEDICAL & SURGICAL HISTORY  Down syndrome    Osteoporosis    Mild anemia    Neuropathy    S/P debridement  of R hip on 3/2/21      SOCIAL HISTORY:  Social History:  Lives at nursing home (14 Oct 2023 20:33)      ALLERGIES:  No Known Allergies    MEDICATIONS:  STANDING MEDICATIONS  acetaminophen     Tablet .. 650 milliGRAM(s) Oral every 12 hours  apixaban 5 milliGRAM(s) Oral every 12 hours  artificial  tears Solution 1 Drop(s) Both EYES two times a day  chlorhexidine 2% Cloths 1 Application(s) Topical <User Schedule>  gabapentin 600 milliGRAM(s) Oral daily  levETIRAcetam 500 milliGRAM(s) Oral two times a day  meropenem  IVPB 1000 milliGRAM(s) IV Intermittent every 8 hours  midodrine 10 milliGRAM(s) Oral every 8 hours  multivitamin 1 Tablet(s) Oral daily    PRN MEDICATIONS  melatonin 5 milliGRAM(s) Oral at bedtime PRN    VITALS:   T(F): 96.9  HR: 101  BP: 97/54  RR: 18  SpO2: --    PHYSICAL EXAM:  GENERAL:   ( X ) NAD, lying in bed comfortably     (  ) obtunded     (  ) lethargic     (  ) somnolent    HEAD:   ( X ) Atraumatic     (  ) hematoma     (  ) laceration (specify location:       )     NECK:  ( X ) Supple     (  ) neck stiffness     (  ) nuchal rigidity     (  )  no JVD     (  ) JVD present ( -- cm)    HEART:  Rate -->     ( X ) normal rate     (  ) bradycardic     (  ) tachycardic  Rhythm -->     ( X ) regular     (  ) regularly irregular     (  ) irregularly irregular  Murmurs -->     ( X ) normal s1s2     (  ) systolic murmur     (  ) diastolic murmur     (  ) continuous murmur      (  ) S3 present     (  ) S4 present    LUNGS:   ( X )Unlabored respirations     (  ) tachypnea  ( X ) B/L air entry     (  ) decreased breath sounds in:  (location     )    ( X ) no adventitious sound     (  ) crackles     (  ) wheezing      (  ) rhonchi      (specify location:       )  (  ) chest wall tenderness (specify location:       )    ABDOMEN:   ( X ) Soft     (  ) tense   |   (  ) nondistended     (  ) distended   |   (  ) +BS     (  ) hypoactive bowel sounds     (  ) hyperactive bowel sounds  ( X ) nontender     (  ) RUQ tenderness     (  ) RLQ tenderness     (  ) LLQ tenderness     (  ) epigastric tenderness     (  ) diffuse tenderness  (  ) Splenomegaly      (  ) Hepatomegaly      (  ) Jaundice     (  ) ecchymosis     EXTREMITIES:  (  ) Normal     (  ) Rash     (  ) ecchymosis     (  ) varicose veins      (  ) pitting edema     (  ) non-pitting edema   (  ) ulceration     (  ) gangrene:     (location: B/L lower limbs dressing s/p debridement   )    NERVOUS SYSTEM:    Non-verbal

## 2023-11-17 NOTE — PROGRESS NOTE ADULT - ASSESSMENT
58 year old female with a PMHx of Down syndrome, nonverbal at baseline, hypothyroidism, cerebral palsy, Seizures, presents to the ED from nursing facility for syncope associated with tonic-clonic movement witnessed by aide. Initial vitals at nursing home showed bradycardia and hypoxia for about 10-15 minutes. No fevers, chill, cough, vomiting, diarrhea, difficulty breathing.    Pt initial was admitted to medical floor then on 10/17 was upgraded to ICU for respiratory distress and hypotension and now downgraded to CEU.    #Right foot (1st toe stump and 2nd distal phalanx) OM and multiple L foot deep tissue injuries (seen on xray and MRI)  - Patient underwent excisional debridement of ulcer of right foot (including 1st metatarsal) and partial amputation 2nd toe   - Cx grew Proteus ESBL and started on Meropenem 1g q8h IV, until 11/19, will complete course before leaving.   - Wound care following  - No further debridement as per podiatry, fu as op    #Acute hypoxemic respiratory failure-2/2 Aspiration pneumonia  -aggressive pulm suctioning and chest PT--aspiration precautions including hob elevation (feed with assistance)  -frequent turning/off loading and position change as per protocol with local wound/skin care  -respiratory status improving  - tried on weaning off on the oxygen ; patient has been stable   - S/p meropenem and caspofungin   -fungitell + x3 -> spoke w/ ID: this was previously covered in ICU w/ caspofungin -> no further tx    #DVT-nonocclusive of L common femoral vein  - on enoxaparin 55 mg subcut q12 hours,  switched to Eliquis 5 mg PO BID on 11/16     #Seizure- abnormal VEEG  - on keprra 500 mg BID   - monitor neuro checks    #Chronic barlow for urinary retention, removed  - Patient does not have barlow at group home  - TOV successful on 11/14    DVT: started on lovenox 55 mg sq q 12 hours, switched to Eliquis 5 mg PO BID on 11/16   guarded prognosis  FULL CODE    Dispo: Group Home ([facility]) eventually

## 2023-11-17 NOTE — PROGRESS NOTE ADULT - ASSESSMENT
58 year old woman with a PMHx of Down syndrome, nonverbal, WC bound and gerardo lift at baseline, hypothyroidism, cerebral palsy, congenital pulmonary stenosis, Seizures, presents to the ED from nursing facility for syncope associated with tonic-clonic movement witnessed by aide. Initial vitals at nursing home showed bradycardia and hypoxia for about 10-15 minutes. No fevers, chill, cough, vomiting, diarrhea, difficulty breathing.      Pt, initially, was admitted to medical floor, then on 10/17 was upgraded to ICU for respiratory distress and hypotension and then downgraded to CEU and now the med floor.    # Limit Labs     # per  mtg today (11/17):   personnel want PT eval (though pt is non-verbal, non-interactive and contracted at legs)   asked for updated body wt    # sacral ulcer has healed  cont to turn/position; off load  sacral care per RN team    # prior loose BMs; resolved  d/c miralax - getting better    # Acute hypoxemic respiratory failure-2/2 aspiration pneumonia  abx completed  fungitell + x3 -> spoke w/ ID: this was previously covered in ICU w/ caspofungin -> no further tx  aspiration precautions including hob elevation (feed with assistance)  now off O2  c/w midodrine 10 q8 - (hold mido dose if BP > 140/90)  c/w MVI q24    # urinary retention  barlow changed 11/7  barlow now removed -> passed TOV    # Right foot OM (1st toe amp stump and 2nd toe distal phalanx); hx multiple Lt foot deep tissue injuries (seen on xray and MRI)  f/u Pod: s/p amp thru 1st MT head and partial 2nd digit amp -> this should eradicate infection and OM  Wound care per Pod  f/u ID  abx: danna 1gm iv q8 til 11/19 - needs to stay in hospital (cannot get to NH w/out long screening process for  pt w/ Down's - will be easier to complete abx here)  ESR 10/15/23: 67  CRP 10/15/23: 16.1  OR Cx: ESBL Prot Mirab  OR path: clean margins w/out OM    # acute DVT Lt common femoral vein  d/c LMWH  Eliquis 5mg po q12 - re-eval in 6mo the need for continued a/c (mid April 2024)    # Seizure  abnormal VEEG  on keprra 500 mg BID   c/w neurontin 600mg q24  outpt neuro f/u    # Down Synd; CP  functional quadriplegia - pt is totally dependent on all ADLs, even eating (but eats well)    # DVT ppx: on DOAC     # GI ppx: ppi po q12    # FULL CODE    Dispo: f/u with pod; iv abx thru [facility] - f/u ID; a/c; PT eval; body wt  eventually, pt will likely go back to [facility] w/ wound care on MON (11/20) - f/u CM

## 2023-11-18 PROCEDURE — 99232 SBSQ HOSP IP/OBS MODERATE 35: CPT

## 2023-11-18 RX ADMIN — MEROPENEM 100 MILLIGRAM(S): 1 INJECTION INTRAVENOUS at 13:10

## 2023-11-18 RX ADMIN — CHLORHEXIDINE GLUCONATE 1 APPLICATION(S): 213 SOLUTION TOPICAL at 05:05

## 2023-11-18 RX ADMIN — LEVETIRACETAM 500 MILLIGRAM(S): 250 TABLET, FILM COATED ORAL at 05:28

## 2023-11-18 RX ADMIN — Medication 1 DROP(S): at 05:05

## 2023-11-18 RX ADMIN — Medication 1 DROP(S): at 17:25

## 2023-11-18 RX ADMIN — MIDODRINE HYDROCHLORIDE 10 MILLIGRAM(S): 2.5 TABLET ORAL at 21:42

## 2023-11-18 RX ADMIN — Medication 1 TABLET(S): at 11:28

## 2023-11-18 RX ADMIN — Medication 650 MILLIGRAM(S): at 05:08

## 2023-11-18 RX ADMIN — MIDODRINE HYDROCHLORIDE 10 MILLIGRAM(S): 2.5 TABLET ORAL at 13:14

## 2023-11-18 RX ADMIN — GABAPENTIN 600 MILLIGRAM(S): 400 CAPSULE ORAL at 11:28

## 2023-11-18 RX ADMIN — MEROPENEM 100 MILLIGRAM(S): 1 INJECTION INTRAVENOUS at 21:41

## 2023-11-18 RX ADMIN — APIXABAN 5 MILLIGRAM(S): 2.5 TABLET, FILM COATED ORAL at 05:08

## 2023-11-18 RX ADMIN — MEROPENEM 100 MILLIGRAM(S): 1 INJECTION INTRAVENOUS at 05:07

## 2023-11-18 RX ADMIN — APIXABAN 5 MILLIGRAM(S): 2.5 TABLET, FILM COATED ORAL at 17:24

## 2023-11-18 RX ADMIN — LEVETIRACETAM 500 MILLIGRAM(S): 250 TABLET, FILM COATED ORAL at 17:24

## 2023-11-18 RX ADMIN — Medication 650 MILLIGRAM(S): at 17:24

## 2023-11-18 RX ADMIN — MIDODRINE HYDROCHLORIDE 10 MILLIGRAM(S): 2.5 TABLET ORAL at 05:08

## 2023-11-18 NOTE — PROGRESS NOTE ADULT - SUBJECTIVE AND OBJECTIVE BOX
24H events:    Patient is a 58y old Female who presents with a chief complaint of Pre-syncope (17 Nov 2023 16:35)    Primary diagnosis of Pre-syncope      Today is hospital day 35d. This morning patient was seen and examined at bedside, resting comfortably in bed.    No acute or major events overnight.      PAST MEDICAL & SURGICAL HISTORY  Down syndrome    Osteoporosis    Mild anemia    Neuropathy    S/P debridement  of R hip on 3/2/21      SOCIAL HISTORY:  Social History:  Lives at nursing home (14 Oct 2023 20:33)      ALLERGIES:  No Known Allergies    MEDICATIONS:  STANDING MEDICATIONS  acetaminophen     Tablet .. 650 milliGRAM(s) Oral every 12 hours  apixaban 5 milliGRAM(s) Oral every 12 hours  artificial  tears Solution 1 Drop(s) Both EYES two times a day  chlorhexidine 2% Cloths 1 Application(s) Topical <User Schedule>  gabapentin 600 milliGRAM(s) Oral daily  levETIRAcetam 500 milliGRAM(s) Oral two times a day  meropenem  IVPB 1000 milliGRAM(s) IV Intermittent every 8 hours  midodrine 10 milliGRAM(s) Oral every 8 hours  multivitamin 1 Tablet(s) Oral daily    PRN MEDICATIONS  melatonin 5 milliGRAM(s) Oral at bedtime PRN    VITALS:   T(F): 97.1  HR: 82  BP: 124/72  RR: 18  SpO2: --    PHYSICAL EXAM:  GENERAL:   ( X ) NAD, lying in bed comfortably     (  ) obtunded     (  ) lethargic     (  ) somnolent    HEAD:   ( X ) Atraumatic     (  ) hematoma     (  ) laceration (specify location:       )     NECK:  ( X ) Supple     (  ) neck stiffness     (  ) nuchal rigidity     (  )  no JVD     (  ) JVD present ( -- cm)    HEART:  Rate -->     ( X ) normal rate     (  ) bradycardic     (  ) tachycardic  Rhythm -->     ( X ) regular     (  ) regularly irregular     (  ) irregularly irregular  Murmurs -->     ( X ) normal s1s2     (  ) systolic murmur     (  ) diastolic murmur     (  ) continuous murmur      (  ) S3 present     (  ) S4 present    LUNGS:   ( X )Unlabored respirations     (  ) tachypnea  ( X ) B/L air entry     (  ) decreased breath sounds in:  (location     )    ( X ) no adventitious sound     (  ) crackles     (  ) wheezing      (  ) rhonchi      (specify location:       )  (  ) chest wall tenderness (specify location:       )    ABDOMEN:   ( X ) Soft     (  ) tense   |   (  ) nondistended     (  ) distended   |   (  ) +BS     (  ) hypoactive bowel sounds     (  ) hyperactive bowel sounds  ( X ) nontender     (  ) RUQ tenderness     (  ) RLQ tenderness     (  ) LLQ tenderness     (  ) epigastric tenderness     (  ) diffuse tenderness  (  ) Splenomegaly      (  ) Hepatomegaly      (  ) Jaundice     (  ) ecchymosis     EXTREMITIES:  (  ) Normal     (  ) Rash     (  ) ecchymosis     (  ) varicose veins      (  ) pitting edema     (  ) non-pitting edema   (  ) ulceration     (  ) gangrene:     (location: S/P debridement  )    NERVOUS SYSTEM:    Non verbal    SKIN:   ( X ) No rashes or lesions     (  ) maculopapular rash     (  ) pustules     (  ) vesicles     (  ) ulcer     (  ) ecchymosis     (specify location:     )

## 2023-11-18 NOTE — PROGRESS NOTE ADULT - ATTENDING COMMENTS
58 year old female with a PMHx of Down syndrome, nonverbal at baseline, hypothyroidism, cerebral palsy, Seizures, presents to the ED from nursing facility for syncope associated with tonic-clonic movement witnessed by aide. Initial vitals at nursing home showed bradycardia and hypoxia for about 10-15 minutes.  Pt initial was admitted to medical floor then on 10/17 was upgraded to ICU for respiratory distress and hypotension ,now downgraded to floor.    #Right foot (1st toe stump and 2nd distal phalanx) OM and multiple L foot deep tissue injuries (seen on xray and MRI)  - Patient underwent excisional debridement of ulcer of right foot (including 1st metatarsal) and partial amputation 2nd toe   - No further surgical dx per podiatry, OP follow up   - Cx grew Proteus ESBL and started on Meropenem 1g q8h IV, will insert midline today to prepare for discharge with ertapenem per ID reccs   - Wound care following     #Acute hypoxemic respiratory failure-2/2 Aspiration pneumonia  -aggressive pulm suctioning and chest PT--aspiration precautions including hob elevation (feed with assistance)  -frequent turning/off loading and position change as per protocol with local wound/skin care  -respiratory status improving, on RA   - S/p meropenem and caspofungin for positive fungitell     #DVT-nonocclusive of L common femoral vein  - on enoxaparin 55 mg subcut q12    #Seizure- abnormal VEEG  - on Keppra 500 mg BID   -monitor neuro checks    #? chronic barlow   - aid at bedside states that patient is usually incontinent at group home and used diapers, but has never had an indwelling barlow   - will do TOV for patient, and can do primafit here if she is urinating well     DVT: therapeutic lovenox   guarded prognosis  FULL CODE    Dispo: Group Home ([facility]) vs STR for abx on dc     Total time spent to complete patient's bedside assessment, review medical chart, discuss medical plan of care with covering medical team was more than 35 minutes  with >50% of time spent face to face with patient, discussion with patient/family and/or coordination of care    Nicole Osborn, DO
My note supersedes all residents notes that I sign, My correction for their notes are in my notes   On exam  General: awake, non verbal, NAD, chronic ill appearance  Lungs:  crackles b/l, normal resp effort on NC   Heart: regular ryhthm   Abdomen: soft, non tender non distended  Ext: contracted LE , feet dressings     Patient is a 58 year old female with a PMHx of Down syndrome, nonverbal at baseline, hypothyroidism, cerebral palsy, congenital pulmonary stenosis, Seizures, presents to the ED from nursing facility for syncope the patient was hospitalized from  9/29/2023 -> 10/13/2023 for hemoptysis and duodenal perforation requiring intubation and ICU admission for hypovolemic and septic shock requiring temporary pressor support and treated for multifocal Pna  Patient's hospital course ICU --> Stepdown --> General medical floor. Patient remains hemodynamically stable on general medical, tolerating pureed feeds, and has bowel movements. Patient was discharged on 10/13/2023 , then she came to the  ED 10/14/2023 from nursing facility for syncope associated with tonic-clonic movement witnessed by aide. Initial vitals at nursing home showed bradycardia and hypoxia for about 10-15 minutes. No fevers, chill, cough, vomiting, diarrhea, difficulty breathing.    Pt initialy was admitted to medical floor then on 10/17 was upgraded to ICU fit rest distress and hypotension    Pt downgraded to CEU ,    Acute hypoxemic respiratory failure -  extubated on 10/21 Now on HFNC   Aspiration pneumonia    DVT   Elevated Fungitell   HO recent duodenal perforation   foot OM R  HO polymicrobial bacteremia   H/O CP  H/o seizure  Skin wounds     MR right foot - 1.  Limited exam. 2.  Osteomyelitis of the first metatarsal stump. 3.  Osteomyelitis of the second toe distal phalanx. Podiatry is following, was initially scheduled for OR debridement on 10/20, which was cancelled d/t patient condition.   Podiatry will  reschedule surgery when more stable   ID on board completed course of meropenem and caspo finished today   SLP input appreciated , c/w purred diet   chronic barlow, barlow cath care   Bilateral buttocks, Friction and Moisture Associated Skin Damage  Multiple deep tissue injuries to left foot. Blood filled blisters.   wound care input appreciated   c/w wound care as per reccs frequent turning, off loading,and positioning and skin care as per protocol, Maintain pressure injury prevention, Keep skin clean, Offload heels, Monitor wound for changes and notify provider if any   Podiatry following for wound to right foot.    Neurology input appreciated  Started on  Keppra 500 mg BID for abnormal VEEG , monitor level    Wean O2 as tolerated.  Keep SaO2 92 TO 96%.  NIV during sleep      overall guarded prognosis
Seen and examined the patient. Second seizure like activity since admission. Will do VEEG.
VEEG showed right posterior quadrant sharp waves that are probably epileptiform and sharp transients. Start Keppra 500 mg BID. Stop VEEG. Will follow as needed
58 year old female with a PMHx of Down syndrome, nonverbal at baseline, hypothyroidism, cerebral palsy, Seizures, presents to the ED from nursing facility for syncope associated with tonic-clonic movement witnessed by aide. Initial vitals at nursing home showed bradycardia and hypoxia for about 10-15 minutes.  Pt initial was admitted to medical floor then on 10/17 was upgraded to ICU for respiratory distress and hypotension ,now downgraded to floor.    #Right foot (1st toe stump and 2nd distal phalanx) OM and multiple L foot deep tissue injuries (seen on xray and MRI)  - Patient underwent excisional debridement of ulcer of right foot (including 1st metatarsal) and partial amputation 2nd toe   - No further surgical dx per podiatry, OP follow up   - Cx grew Proteus ESBL and started on Meropenem 1g q8h IV, will insert midline today to prepare for discharge with ertapenem per ID reccs   - Wound care following     #Acute hypoxemic respiratory failure-2/2 Aspiration pneumonia  -aggressive pulm suctioning and chest PT--aspiration precautions including hob elevation (feed with assistance)  -frequent turning/off loading and position change as per protocol with local wound/skin care  -respiratory status improving, on RA   - S/p meropenem and caspofungin for positive fungitell     #DVT-nonocclusive of L common femoral vein  - on enoxaparin 55 mg subcut q12    #Seizure- abnormal VEEG  - on Keppra 500 mg BID   -monitor neuro checks    #Passed TOV- barlow removed, Aid at bedside states that patient is usually incontinent at group home and used diapers, but has never had an indwelling barlow     DVT: therapeutic lovenox   guarded prognosis  FULL CODE    Dispo: Group Home ([facility]) , will complete antibiotic course here and dc patient on sunday. Plan for meeting with Group Home on Friday, likely around 1pm, SW to coordinate. On Sunday prior to discharge, will give 1x dose of Ertapenem to finish abx course so as not to delay discharge.     Total time spent to complete patient's bedside assessment, review medical chart, discuss medical plan of care with covering medical team was more than 35 minutes  with >50% of time spent face to face with patient, discussion with patient/family and/or coordination of care    Nicole Osborn DO .
58 year old female with a PMHx of Down syndrome, nonverbal at baseline, hypothyroidism, cerebral palsy, Seizures, presents to the ED from nursing facility for syncope associated with tonic-clonic movement witnessed by aide. Initial vitals at nursing home showed bradycardia and hypoxia for about 10-15 minutes. No fevers, chill, cough, vomiting, diarrhea, difficulty breathing.    Pt initial was admitted to medical floor then on 10/17 was upgraded to ICU for respiratory distress and hypotension and now downgraded to floor.    #Right foot (1st toe stump and 2nd distal phalanx) OM and multiple L foot deep tissue injuries (seen on xray and MRI)  - Patient underwent excisional debridement of ulcer of right foot (including 1st metatarsal) and partial amputation 2nd toe   - Cx grew Proteus ESBL and started on Meropenem 1g q8h IV, until 11/19, will complete course before leaving.   - No further debridement as per podiatry, fu as op    #Acute hypoxemic respiratory failure-2/2 Aspiration pneumonia  - continue meropenem, will be completed 11/19  -fungitell + x3 -> spoke w/ ID: this was previously covered in ICU w/ caspofungin -> no further tx    #DVT-nonocclusive of L common femoral vein  - c/w Eliquis 5 mg PO BID on 11/16     #Seizure- abnormal VEEG  - on keprra 500 mg BID     #Chronic balrow for urinary retention, removed  - Patient does not have barlow at group home  - TOV successful on 11/14    DVT: eliquis   guarded prognosis  FULL CODE    Dispo: Group Home ([facility]) on monday     Total time spent to complete patient's bedside assessment, review medical chart, discuss medical plan of care with covering medical team was more than 35 minutes  with >50% of time spent face to face with patient, discussion with patient/family and/or coordination of care      Nicole Osborn, 
IMPRESSION:    Acute hypoxemic respiratory failure improving   Aspiration pneumonia treated   DVT   Elevated Fungitell SP anti fungal course   HO recent duodenal perforation   foot OM  HO polymicrobial bacteremia   H/O CP  H/o seizures    Plan as outlined above

## 2023-11-19 PROCEDURE — 99232 SBSQ HOSP IP/OBS MODERATE 35: CPT

## 2023-11-19 RX ADMIN — LEVETIRACETAM 500 MILLIGRAM(S): 250 TABLET, FILM COATED ORAL at 18:11

## 2023-11-19 RX ADMIN — APIXABAN 5 MILLIGRAM(S): 2.5 TABLET, FILM COATED ORAL at 05:32

## 2023-11-19 RX ADMIN — Medication 1 DROP(S): at 05:33

## 2023-11-19 RX ADMIN — CHLORHEXIDINE GLUCONATE 1 APPLICATION(S): 213 SOLUTION TOPICAL at 05:33

## 2023-11-19 RX ADMIN — Medication 650 MILLIGRAM(S): at 06:28

## 2023-11-19 RX ADMIN — Medication 650 MILLIGRAM(S): at 18:11

## 2023-11-19 RX ADMIN — MEROPENEM 100 MILLIGRAM(S): 1 INJECTION INTRAVENOUS at 05:33

## 2023-11-19 RX ADMIN — Medication 650 MILLIGRAM(S): at 05:32

## 2023-11-19 RX ADMIN — MEROPENEM 100 MILLIGRAM(S): 1 INJECTION INTRAVENOUS at 21:46

## 2023-11-19 RX ADMIN — MEROPENEM 100 MILLIGRAM(S): 1 INJECTION INTRAVENOUS at 14:13

## 2023-11-19 RX ADMIN — GABAPENTIN 600 MILLIGRAM(S): 400 CAPSULE ORAL at 11:18

## 2023-11-19 RX ADMIN — LEVETIRACETAM 500 MILLIGRAM(S): 250 TABLET, FILM COATED ORAL at 05:31

## 2023-11-19 RX ADMIN — Medication 1 TABLET(S): at 11:18

## 2023-11-19 RX ADMIN — MIDODRINE HYDROCHLORIDE 10 MILLIGRAM(S): 2.5 TABLET ORAL at 05:31

## 2023-11-19 RX ADMIN — APIXABAN 5 MILLIGRAM(S): 2.5 TABLET, FILM COATED ORAL at 18:11

## 2023-11-19 RX ADMIN — Medication 1 DROP(S): at 18:11

## 2023-11-19 RX ADMIN — MIDODRINE HYDROCHLORIDE 10 MILLIGRAM(S): 2.5 TABLET ORAL at 21:46

## 2023-11-19 NOTE — PROGRESS NOTE ADULT - SUBJECTIVE AND OBJECTIVE BOX
TEA ECHAVARRIA  58y, Female  Allergy: No Known Allergies    Hospital Day: 36d    Patient seen and examined earlier today. No events overnight, planned for dc tomorrow back to .     PMH/PSH:  PAST MEDICAL & SURGICAL HISTORY:  Down syndrome      Osteoporosis      Mild anemia      Neuropathy      S/P debridement  of R hip on 3/2/21          LAST 24-Hr EVENTS:    VITALS:  T(F): 98.9 (11-19-23 @ 12:26), Max: 99 (11-18-23 @ 20:30)  HR: 101 (11-19-23 @ 14:01)  BP: 128/71 (11-19-23 @ 14:01) (117/67 - 128/71)  RR: 18 (11-19-23 @ 14:01)  SpO2: 96% (11-19-23 @ 14:01)        TESTS & MEASUREMENTS:  Weight (Kg):   BMI (kg/m2): 25 (11-17)    11-17-23 @ 07:01  -  11-18-23 @ 07:00  --------------------------------------------------------  IN: 0 mL / OUT: 450 mL / NET: -450 mL                                      RADIOLOGY, ECG, & ADDITIONAL TESTS:      RECENT DIAGNOSTIC ORDERS:      MEDICATIONS:  MEDICATIONS  (STANDING):  acetaminophen     Tablet .. 650 milliGRAM(s) Oral every 12 hours  apixaban 5 milliGRAM(s) Oral every 12 hours  artificial  tears Solution 1 Drop(s) Both EYES two times a day  chlorhexidine 2% Cloths 1 Application(s) Topical <User Schedule>  gabapentin 600 milliGRAM(s) Oral daily  levETIRAcetam 500 milliGRAM(s) Oral two times a day  meropenem  IVPB 1000 milliGRAM(s) IV Intermittent every 8 hours  midodrine 10 milliGRAM(s) Oral every 8 hours  multivitamin 1 Tablet(s) Oral daily    MEDICATIONS  (PRN):  melatonin 5 milliGRAM(s) Oral at bedtime PRN Insomnia      HOME MEDICATIONS:  gabapentin 600 mg oral tablet (10-14)  melatonin 5 mg oral tablet (10-14)  Multiple Vitamins oral tablet (10-14)  raloxifene 60 mg oral tablet (10-14)      PHYSICAL EXAM:  GENERAL: awake, nonverbal   HNENT: EOMI, PERRLA    NECK: No LAD/swelling  CHEST/LUNG:  CTAB no wheezes/rales/ronchi  HEART: RRR, No murmurs  ABDOMEN: Soft, NT, ND,  Bowel sounds present  EXTREMITIES:  Warm well perfused, contracted , small body habitus

## 2023-11-19 NOTE — PHYSICAL THERAPY INITIAL EVALUATION ADULT - PERTINENT HX OF CURRENT PROBLEM, REHAB EVAL
Patient is a 58 year old female with a PMHx of Down syndrome, nonverbal at baseline, hypothyroidism, cerebral palsy, congenital pulmonary stenosis, Seizures, presents to the ED from nursing facility for syncope associated with tonic-clonic movement witnessed by aide.

## 2023-11-19 NOTE — PHYSICAL THERAPY INITIAL EVALUATION ADULT - MARITAL STATUS
Subjective   Patient ID: Kalpana is a 17 month old female.    Chief Complaint   Patient presents with   • Cough     runny nose,      17-month-old child here with complaint of cold symptoms for close to 2 weeks now.  There was no fever.  She does have some congested cough and the runny nose.  The mucus is clearish to white.  There is no change in her behavior.  There is no change in her appetite.  There is normal bowel movements.  She does not appear to be short of breath.  She does not appear to be in any distress.  Her older sister was sick.  She brought it from .  She is back to normal now   mom has been giving her fluids.        URI   This is a new problem. The current episode started 1 to 4 weeks ago. The problem occurs intermittently. The problem has been unchanged. Associated symptoms include coughing. Nothing aggravates the symptoms. She has tried drinking for the symptoms. The treatment provided no relief.       Patient's medications, allergies, past medical, surgical, social and family histories were reviewed and updated as appropriate.    Review of Systems   Constitutional: Negative.    HENT: Positive for rhinorrhea.    Eyes: Negative.    Respiratory: Positive for cough. Negative for choking, wheezing and stridor.    Cardiovascular: Negative.    Gastrointestinal: Negative.    Skin: Negative.    Allergic/Immunologic: Negative.    Neurological: Negative.    Psychiatric/Behavioral: Negative.    All other systems reviewed and are negative.      Objective   Physical Exam   Constitutional: She appears well-developed and well-nourished. She is active.   HENT:   Right Ear: Tympanic membrane normal.   Left Ear: Tympanic membrane normal.   Nose: Nose normal.   Mouth/Throat: Mucous membranes are moist. Oropharynx is clear.   There is clear nasal discharge   Eyes: Conjunctivae are normal. Right eye exhibits no discharge. Left eye exhibits no discharge.   Neck: Neck supple.   Cardiovascular: Normal rate, regular  rhythm, S1 normal and S2 normal.   Pulmonary/Chest: Effort normal and breath sounds normal.   There is congested cough   Abdominal: Soft. Bowel sounds are normal.   Lymphadenopathy:     She has no cervical adenopathy.   Neurological: She is alert.   Skin: Skin is warm.   Nursing note and vitals reviewed.      Patient Active Problem List   Diagnosis   • Normal  (single liveborn)       Assessment   Problem List Items Addressed This Visit     None      Visit Diagnoses     Upper respiratory tract infection, unspecified type    -  Primary          Schedule follow up: as needed   Single

## 2023-11-19 NOTE — PHYSICAL THERAPY INITIAL EVALUATION ADULT - GENERAL OBSERVATIONS, REHAB EVAL
PT IE completed. pt encountered side lying in bed with HHA at b/s. pt minimally responds to verbal stimuli.

## 2023-11-19 NOTE — PROGRESS NOTE ADULT - ASSESSMENT
58 year old female with a PMHx of Down syndrome, nonverbal at baseline, hypothyroidism, cerebral palsy, Seizures, presents to the ED from nursing facility for syncope associated with tonic-clonic movement witnessed by aide. Initial vitals at nursing home showed bradycardia and hypoxia for about 10-15 minutes. No fevers, chill, cough, vomiting, diarrhea, difficulty breathing.    Pt initial was admitted to medical floor then on 10/17 was upgraded to ICU for respiratory distress and hypotension and now downgraded to floor.    #Right foot (1st toe stump and 2nd distal phalanx) OM and multiple L foot deep tissue injuries (seen on xray and MRI)  - Patient underwent excisional debridement of ulcer of right foot (including 1st metatarsal) and partial amputation 2nd toe   - Cx grew Proteus ESBL and started on Meropenem 1g q8h IV, until 11/19, will complete course before leaving.   - No further debridement as per podiatry, fu as op    #Acute hypoxemic respiratory failure-2/2 Aspiration pneumonia  - continue meropenem, will be completed 11/19  -fungitell + x3 -> spoke w/ ID: this was previously covered in ICU w/ caspofungin -> no further tx    #DVT-nonocclusive of L common femoral vein  - c/w Eliquis 5 mg PO BID on 11/16     #Seizure- abnormal VEEG  - on keprra 500 mg BID     #Chronic barlow for urinary retention, removed  - Patient does not have barlow at group home  - TOV successful on 11/14    DVT: eliquis   guarded prognosis  FULL CODE    Dispo: Group Home ([facility]) on monday     Total time spent to complete patient's bedside assessment, review medical chart, discuss medical plan of care with covering medical team was more than 35 minutes  with >50% of time spent face to face with patient, discussion with patient/family and/or coordination of care      Nicole Osborn DO .

## 2023-11-20 ENCOUNTER — TRANSCRIPTION ENCOUNTER (OUTPATIENT)
Age: 58
End: 2023-11-20

## 2023-11-20 VITALS — DIASTOLIC BLOOD PRESSURE: 58 MMHG | HEART RATE: 97 BPM | SYSTOLIC BLOOD PRESSURE: 104 MMHG

## 2023-11-20 PROCEDURE — 99239 HOSP IP/OBS DSCHRG MGMT >30: CPT

## 2023-11-20 RX ORDER — RALOXIFENE HYDROCHLORIDE 60 MG/1
1 TABLET, COATED ORAL
Qty: 0 | Refills: 0 | DISCHARGE

## 2023-11-20 RX ORDER — MIDODRINE HYDROCHLORIDE 2.5 MG/1
1 TABLET ORAL
Qty: 90 | Refills: 0
Start: 2023-11-20 | End: 2023-12-19

## 2023-11-20 RX ORDER — LEVETIRACETAM 250 MG/1
1 TABLET, FILM COATED ORAL
Qty: 60 | Refills: 0
Start: 2023-11-20 | End: 2023-12-19

## 2023-11-20 RX ORDER — GABAPENTIN 400 MG/1
1 CAPSULE ORAL
Refills: 0 | DISCHARGE

## 2023-11-20 RX ORDER — APIXABAN 2.5 MG/1
1 TABLET, FILM COATED ORAL
Qty: 60 | Refills: 0
Start: 2023-11-20 | End: 2023-12-19

## 2023-11-20 RX ORDER — LANOLIN ALCOHOL/MO/W.PET/CERES
1 CREAM (GRAM) TOPICAL
Qty: 0 | Refills: 0 | DISCHARGE

## 2023-11-20 RX ORDER — GABAPENTIN 400 MG/1
1 CAPSULE ORAL
Qty: 30 | Refills: 0
Start: 2023-11-20 | End: 2023-12-19

## 2023-11-20 RX ADMIN — MIDODRINE HYDROCHLORIDE 10 MILLIGRAM(S): 2.5 TABLET ORAL at 12:06

## 2023-11-20 RX ADMIN — MIDODRINE HYDROCHLORIDE 10 MILLIGRAM(S): 2.5 TABLET ORAL at 05:48

## 2023-11-20 RX ADMIN — MEROPENEM 100 MILLIGRAM(S): 1 INJECTION INTRAVENOUS at 05:52

## 2023-11-20 RX ADMIN — CHLORHEXIDINE GLUCONATE 1 APPLICATION(S): 213 SOLUTION TOPICAL at 05:52

## 2023-11-20 RX ADMIN — Medication 1 DROP(S): at 05:53

## 2023-11-20 RX ADMIN — APIXABAN 5 MILLIGRAM(S): 2.5 TABLET, FILM COATED ORAL at 05:50

## 2023-11-20 RX ADMIN — LEVETIRACETAM 500 MILLIGRAM(S): 250 TABLET, FILM COATED ORAL at 05:49

## 2023-11-20 RX ADMIN — GABAPENTIN 600 MILLIGRAM(S): 400 CAPSULE ORAL at 12:06

## 2023-11-20 RX ADMIN — Medication 650 MILLIGRAM(S): at 05:51

## 2023-11-20 RX ADMIN — Medication 1 TABLET(S): at 12:06

## 2023-11-20 NOTE — DISCHARGE NOTE NURSING/CASE MANAGEMENT/SOCIAL WORK - PATIENT PORTAL LINK FT
You can access the FollowMyHealth Patient Portal offered by [facility] by registering at the following website: [email or web address]. By joining [facility]’s FollowMyHealth portal, you will also be able to view your health information using other applications (apps) compatible with our system.

## 2023-11-20 NOTE — DISCHARGE NOTE PROVIDER - NSDCCPCAREPLAN_GEN_ALL_CORE_FT
PRINCIPAL DISCHARGE DIAGNOSIS  Diagnosis: Acute hypoxemic respiratory failure  Assessment and Plan of Treatment: Acute hypoxemic respiratory failure-2/2 Aspiration pneumonia  - completed meropenem and caspofungin      SECONDARY DISCHARGE DIAGNOSES  Diagnosis: Foot osteomyelitis, right  Assessment and Plan of Treatment: Right foot (1st toe stump and 2nd distal phalanx) OM and multiple L foot deep tissue injuries (seen on xray and MRI)  - Patient underwent excisional debridement of ulcer of right foot (including 1st metatarsal) and partial amputation 2nd toe   - Cx grew Proteus ESBL and started on Meropenem 1g q8h IV, completed abx course inpatient   - No further debridement as per podiatry, fu as op    Diagnosis: DVT, lower extremity  Assessment and Plan of Treatment: - c/w Eliquis 5 mg PO BID on 11/16       Diagnosis: Seizure  Assessment and Plan of Treatment: - on keprra 500 mg BID

## 2023-11-20 NOTE — PROGRESS NOTE ADULT - SUBJECTIVE AND OBJECTIVE BOX
24H events:    Patient is a 58y old Female who presents with a chief complaint of Pre-syncope (20 Nov 2023 11:56)    Primary diagnosis of Acute hypoxemic respiratory failure  Today is hospital day 37d. This morning patient was seen and examined at bedside, resting comfortably in bed.    No acute or major events overnight. Non verbal.  The 1:1 sit denies any issues overnight. Last BM today    Code Status: Full    PAST MEDICAL & SURGICAL HISTORY  Down syndrome    Osteoporosis    Mild anemia    Neuropathy    S/P debridement  of R hip on 3/2/21      SOCIAL HISTORY:  Social History:  Lives at nursing home (14 Oct 2023 20:33)      ALLERGIES:  No Known Allergies    MEDICATIONS:  STANDING MEDICATIONS  acetaminophen     Tablet .. 650 milliGRAM(s) Oral every 12 hours  apixaban 5 milliGRAM(s) Oral every 12 hours  artificial  tears Solution 1 Drop(s) Both EYES two times a day  chlorhexidine 2% Cloths 1 Application(s) Topical <User Schedule>  gabapentin 600 milliGRAM(s) Oral daily  levETIRAcetam 500 milliGRAM(s) Oral two times a day  midodrine 10 milliGRAM(s) Oral every 8 hours  multivitamin 1 Tablet(s) Oral daily    PRN MEDICATIONS  melatonin 5 milliGRAM(s) Oral at bedtime PRN    VITALS:   T(F): 99.1  HR: 97  BP: 104/58  RR: 18  SpO2: 96%    PHYSICAL EXAM:  GENERAL:   ( X ) NAD, lying in bed comfortably     (  ) obtunded     (  ) lethargic     (  ) somnolent    HEAD:   ( X ) Atraumatic     (  ) hematoma     (  ) laceration (specify location:       )     NECK:  ( X ) Supple     (  ) neck stiffness     (  ) nuchal rigidity     (  )  no JVD     (  ) JVD present ( -- cm)    HEART:  Rate -->     ( X ) normal rate     (  ) bradycardic     (  ) tachycardic  Rhythm -->     ( X ) regular     (  ) regularly irregular     (  ) irregularly irregular  Murmurs -->     ( X ) normal s1s2     (  ) systolic murmur     (  ) diastolic murmur     (  ) continuous murmur      (  ) S3 present     (  ) S4 present    LUNGS:   ( X )Unlabored respirations     (  ) tachypnea  ( X ) B/L air entry     (  ) decreased breath sounds in:  (location     )    ( X ) no adventitious sound     (  ) crackles     (  ) wheezing      (  ) rhonchi      (specify location:       )  (  ) chest wall tenderness (specify location:       )    ABDOMEN:   ( X ) Soft     (  ) tense   |   (  ) nondistended     (  ) distended   |   (  ) +BS     (  ) hypoactive bowel sounds     (  ) hyperactive bowel sounds  ( X ) nontender     (  ) RUQ tenderness     (  ) RLQ tenderness     (  ) LLQ tenderness     (  ) epigastric tenderness     (  ) diffuse tenderness  (  ) Splenomegaly      (  ) Hepatomegaly      (  ) Jaundice     (  ) ecchymosis     EXTREMITIES:  (  ) Normal     (  ) Rash     (  ) ecchymosis     (  ) varicose veins      (  ) pitting edema     (  ) non-pitting edema   (  ) ulceration     (  ) gangrene:     (location: S/P debridement  )    NERVOUS SYSTEM:    Non verbal    SKIN:   ( X ) No rashes or lesions     (  ) maculopapular rash     (  ) pustules     (  ) vesicles     (  ) ulcer     (  ) ecchymosis     (specify location:     )

## 2023-11-20 NOTE — PROGRESS NOTE ADULT - ASSESSMENT
58 year old female with a PMHx of Down syndrome, nonverbal at baseline, hypothyroidism, cerebral palsy, Seizures, presents to the ED from nursing facility for syncope associated with tonic-clonic movement witnessed by aide. Initial vitals at nursing home showed bradycardia and hypoxia for about 10-15 minutes. No fevers, chill, cough, vomiting, diarrhea, difficulty breathing.    Pt initial was admitted to medical floor then on 10/17 was upgraded to ICU for respiratory distress and hypotension and now downgraded to CEU.    #Right foot (1st toe stump and 2nd distal phalanx) OM and multiple L foot deep tissue injuries (seen on xray and MRI)  - Patient underwent excisional debridement of ulcer of right foot (including 1st metatarsal) and partial amputation 2nd toe   - Cx grew Proteus ESBL and finished Meropenem course  - Wound care following  - No further debridement as per podiatry, fu as op    #Acute hypoxemic respiratory failure-2/2 Aspiration pneumonia  -aggressive pulm suctioning and chest PT--aspiration precautions including hob elevation (feed with assistance)  -frequent turning/off loading and position change as per protocol with local wound/skin care  -respiratory status improving  - tried on weaning off on the oxygen ; patient has been stable   - S/p meropenem and caspofungin   -fungitell + x3 -> spoke w/ ID: this was previously covered in ICU w/ caspofungin -> no further tx    #DVT-nonocclusive of L common femoral vein  - on enoxaparin 55 mg subcut q12 hours,  switched to Eliquis 5 mg PO BID on 11/16     #Seizure- abnormal VEEG  - on keprra 500 mg BID   - monitor neuro checks    #Chronic barlow for urinary retention, removed  - Patient does not have barlow at group home  - TOV successful on 11/14    DVT: on Eliquis 5 mg PO BID   guarded prognosis  FULL CODE  DC today  Dispo: Group Home ([facility])

## 2023-11-20 NOTE — DISCHARGE NOTE PROVIDER - NSDCMRMEDTOKEN_GEN_ALL_CORE_FT
apixaban 5 mg oral tablet: 1 tab(s) orally every 12 hours  docusate sodium 100 mg oral capsule: 1 cap(s) orally once a day  gabapentin 600 mg oral tablet: 1 tab(s) orally once a day  levETIRAcetam 500 mg oral tablet: 1 tab(s) orally 2 times a day  Melatonin 3 mg oral tablet: 1 tab(s) orally once a day (at bedtime)  midodrine 10 mg oral tablet: 1 tab(s) orally 3 times a day Please discontinue 5mg TID dose, and start 10mg TID dose  Multiple Vitamins oral tablet: 1 tab(s) orally once a day  ocular lubricant ophthalmic solution: 1 drop(s) to each affected eye 2 times a day  Protonix 40 mg oral delayed release tablet: 1 tab(s) orally 2 times a day Protonix 40 mg two times daily for 2 months then once daily thereafter

## 2023-11-20 NOTE — DISCHARGE NOTE NURSING/CASE MANAGEMENT/SOCIAL WORK - NSDCPEFALRISK_GEN_ALL_CORE
For information on Fall & Injury Prevention, visit: [email or web address] OR  [email or web address] OR  https://www.cdc.gov/steadi/patient.html

## 2023-11-20 NOTE — DISCHARGE NOTE PROVIDER - HOSPITAL COURSE
58 year old female with a PMHx of Down syndrome, nonverbal at baseline, hypothyroidism, cerebral palsy, Seizures, presents to the ED from nursing facility for syncope associated with tonic-clonic movement witnessed by aide. Initial vitals at nursing home showed bradycardia and hypoxia for about 10-15 minutes. No fevers, chill, cough, vomiting, diarrhea, difficulty breathing.    Pt initial was admitted to medical floor then on 10/17 was upgraded to ICU for respiratory distress and hypotension and now downgraded to floor.    #Right foot (1st toe stump and 2nd distal phalanx) OM and multiple L foot deep tissue injuries (seen on xray and MRI)  - Patient underwent excisional debridement of ulcer of right foot (including 1st metatarsal) and partial amputation 2nd toe   - Cx grew Proteus ESBL and started on Meropenem 1g q8h IV, completed abx course inpatient   - No further debridement as per podiatry, fu as op    #Acute hypoxemic respiratory failure-2/2 Aspiration pneumonia  - completed meropenem and caspofungin   -fungitell + x3 -> spoke w/ ID: this was previously covered in ICU w/ caspofungin -> no further tx    #DVT-nonocclusive of L common femoral vein  - c/w Eliquis 5 mg PO BID on 11/16     #Seizure- abnormal VEEG  - on keprra 500 mg BID     #Chronic barlow for urinary retention, removed  - Patient does not have barlow at group home  - TOV successful on 11/14

## 2023-11-20 NOTE — PROGRESS NOTE ADULT - PROVIDER SPECIALTY LIST ADULT
Critical Care
Hospitalist
Hospitalist
Infectious Disease
Internal Medicine
MICU
Neurology
Pulmonology
Critical Care
Hospitalist
Internal Medicine
MICU
MICU
Podiatry
Critical Care
Critical Care
Hospitalist
Infectious Disease
Internal Medicine
Neurology
Podiatry
Pulmonology
Critical Care
Hospitalist
Hospitalist
Infectious Disease
Internal Medicine
MICU
MICU
Pulmonology
Critical Care
Infectious Disease

## 2023-11-20 NOTE — DISCHARGE NOTE PROVIDER - CARE PROVIDER_API CALL
Maggie Crow J  Internal Medicine  [address] [place] [address]  Phone: [phone]  Fax: [phone]  Follow Up Time: 2 weeks    Magdaleno Diop  Neurology  [address] [place] [address]  Phone: [phone]  Fax: [phone]  Follow Up Time: 2 weeks

## 2023-11-20 NOTE — PROGRESS NOTE ADULT - REASON FOR ADMISSION
Pre-syncope

## 2023-11-20 NOTE — DISCHARGE NOTE PROVIDER - ATTENDING DISCHARGE PHYSICAL EXAMINATION:
GENERAL: awake, nonverbal   HNENT: EOMI, PERRLA    NECK: No LAD/swelling  CHEST/LUNG:  CTAB no wheezes/rales/ronchi  HEART: RRR, No murmurs  ABDOMEN: Soft, NT, ND,  Bowel sounds present  EXTREMITIES:  Warm well perfused, contracted , small body habitus

## 2023-11-20 NOTE — DISCHARGE NOTE PROVIDER - PROVIDER TOKENS
PROVIDER:[TOKEN:[[id number]],FOLLOWUP:[2 weeks]],PROVIDER:[TOKEN:[[id number]],FOLLOWUP:[2 weeks]]

## 2023-11-27 DIAGNOSIS — J69.0 PNEUMONITIS DUE TO INHALATION OF FOOD AND VOMIT: ICD-10-CM

## 2023-11-27 DIAGNOSIS — J96.01 ACUTE RESPIRATORY FAILURE WITH HYPOXIA: ICD-10-CM

## 2023-11-27 DIAGNOSIS — R94.01 ABNORMAL ELECTROENCEPHALOGRAM [EEG]: ICD-10-CM

## 2023-11-27 DIAGNOSIS — R56.9 UNSPECIFIED CONVULSIONS: ICD-10-CM

## 2023-11-27 DIAGNOSIS — I82.409 ACUTE EMBOLISM AND THROMBOSIS OF UNSPECIFIED DEEP VEINS OF UNSPECIFIED LOWER EXTREMITY: ICD-10-CM

## 2023-11-27 DIAGNOSIS — G80.9 CEREBRAL PALSY, UNSPECIFIED: ICD-10-CM

## 2023-11-27 DIAGNOSIS — Z79.01 LONG TERM (CURRENT) USE OF ANTICOAGULANTS: ICD-10-CM

## 2023-11-27 DIAGNOSIS — Q25.6 STENOSIS OF PULMONARY ARTERY: ICD-10-CM

## 2023-11-27 DIAGNOSIS — E03.9 HYPOTHYROIDISM, UNSPECIFIED: ICD-10-CM

## 2023-11-27 DIAGNOSIS — M86.171 OTHER ACUTE OSTEOMYELITIS, RIGHT ANKLE AND FOOT: ICD-10-CM

## 2023-11-27 DIAGNOSIS — Z78.1 PHYSICAL RESTRAINT STATUS: ICD-10-CM

## 2023-11-27 DIAGNOSIS — R33.8 OTHER RETENTION OF URINE: ICD-10-CM

## 2023-11-29 DIAGNOSIS — I82.412 ACUTE EMBOLISM AND THROMBOSIS OF LEFT FEMORAL VEIN: ICD-10-CM

## 2023-12-01 ENCOUNTER — OUTPATIENT (OUTPATIENT)
Dept: OUTPATIENT SERVICES | Facility: HOSPITAL | Age: 58
LOS: 1 days | End: 2023-12-01
Payer: MEDICARE

## 2023-12-01 ENCOUNTER — APPOINTMENT (OUTPATIENT)
Dept: PODIATRY | Facility: CLINIC | Age: 58
End: 2023-12-01
Payer: COMMERCIAL

## 2023-12-01 DIAGNOSIS — L97.412 NON-PRESSURE CHRONIC ULCER OF RIGHT HEEL AND MIDFOOT WITH FAT LAYER EXPOSED: ICD-10-CM
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
## 2023-12-01 DIAGNOSIS — M14.671 CHARCOT'S JOINT, RIGHT ANKLE AND FOOT: ICD-10-CM

## 2023-12-01 DIAGNOSIS — Z00.00 ENCOUNTER FOR GENERAL ADULT MEDICAL EXAMINATION WITHOUT ABNORMAL FINDINGS: ICD-10-CM
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
## 2023-12-01 PROCEDURE — 99213 OFFICE O/P EST LOW 20 MIN: CPT | Mod: 24

## 2023-12-01 PROCEDURE — 99213 OFFICE O/P EST LOW 20 MIN: CPT

## 2023-12-18 ENCOUNTER — NON-APPOINTMENT (OUTPATIENT)
Age: 58
End: 2023-12-18

## 2023-12-18 ENCOUNTER — APPOINTMENT (OUTPATIENT)
Dept: NEUROLOGY | Facility: CLINIC | Age: 58
End: 2023-12-18
Payer: COMMERCIAL

## 2023-12-18 VITALS
BODY MASS INDEX: 27.31 KG/M2 | HEART RATE: 67 BPM | SYSTOLIC BLOOD PRESSURE: 96 MMHG | OXYGEN SATURATION: 98 % | WEIGHT: 118 LBS | HEIGHT: 55 IN | TEMPERATURE: 96.5 F | DIASTOLIC BLOOD PRESSURE: 70 MMHG

## 2023-12-18 DIAGNOSIS — Q90.9 DOWN SYNDROME, UNSPECIFIED: ICD-10-CM

## 2023-12-18 DIAGNOSIS — R56.9 UNSPECIFIED CONVULSIONS: ICD-10-CM

## 2023-12-18 PROCEDURE — 99204 OFFICE O/P NEW MOD 45 MIN: CPT

## 2023-12-18 RX ORDER — POLYETHYLENE GLYCOL 3350 AND ELECTROLYTES WITH LEMON FLAVOR 236; 22.74; 6.74; 5.86; 2.97 G/4L; G/4L; G/4L; G/4L; G/4L
236 POWDER, FOR SOLUTION ORAL
Qty: 1 | Refills: 0 | Status: DISCONTINUED | COMMUNITY
Start: 2023-06-21 | End: 2023-12-18

## 2023-12-18 RX ORDER — ACETAMINOPHEN 325 MG/1
325 TABLET ORAL
Refills: 0 | Status: ACTIVE | COMMUNITY

## 2023-12-18 RX ORDER — PANTOPRAZOLE 40 MG/1
40 TABLET, DELAYED RELEASE ORAL
Refills: 0 | Status: ACTIVE | COMMUNITY

## 2023-12-18 RX ORDER — LEVETIRACETAM 500 MG/1
500 TABLET, FILM COATED ORAL TWICE DAILY
Qty: 120 | Refills: 4 | Status: ACTIVE | COMMUNITY
Start: 2023-12-18 | End: 1900-01-01

## 2023-12-18 RX ORDER — RALOXIFENE HYDROCHLORIDE 60 MG/1
60 TABLET, FILM COATED ORAL
Refills: 0 | Status: ACTIVE | COMMUNITY

## 2023-12-18 RX ORDER — GLUCOSAMINE HCL/CHONDROITIN SU 500-400 MG
3 CAPSULE ORAL
Refills: 0 | Status: ACTIVE | COMMUNITY

## 2023-12-18 RX ORDER — APIXABAN 5 MG/1
5 TABLET, FILM COATED ORAL
Refills: 0 | Status: ACTIVE | COMMUNITY

## 2023-12-18 RX ORDER — GABAPENTIN 300 MG
300 TABLET ORAL
Refills: 0 | Status: ACTIVE | COMMUNITY

## 2023-12-18 RX ORDER — CALCIUM CARBONATE/VITAMIN D3 500MG-5MCG
500-200 TABLET ORAL
Refills: 0 | Status: ACTIVE | COMMUNITY

## 2023-12-18 RX ORDER — MIDODRINE HYDROCHLORIDE 10 MG/1
10 TABLET ORAL
Refills: 0 | Status: ACTIVE | COMMUNITY

## 2023-12-18 RX ORDER — CHLORHEXIDINE GLUCONATE 4 %
LIQUID (ML) TOPICAL
Refills: 0 | Status: ACTIVE | COMMUNITY

## 2023-12-18 RX ORDER — LEVETIRACETAM 500 MG/1
500 TABLET, FILM COATED ORAL TWICE DAILY
Refills: 0 | Status: ACTIVE | COMMUNITY

## 2023-12-19 PROBLEM — Q90.9 DOWN SYNDROME: Status: ACTIVE | Noted: 2023-12-19

## 2023-12-19 NOTE — REVIEW OF SYSTEMS
[As Noted in HPI] : as noted in HPI [Confused or Disoriented] : confusion [Memory Lapses or Loss] : memory loss [Decr. Concentrating Ability] : decreased concentrating ability [Difficulty with Language] : ~M difficulty with language [Inability to Walk] : inability to walk [Ataxia] : ataxia [Fever] : no fever [Chills] : no chills [Seizures] : no convulsions [Dizziness] : no dizziness [Fainting] : no fainting [Frequent Falls] : not falling

## 2023-12-19 NOTE — PHYSICAL EXAM
[FreeTextEntry1] :  NEURO:    MENTAL STATUS: AAOx0 , non verbal, occasional track and follows with her eyes.   LANG/SPEECH: Nonverbal    MOTOR: Moves both UEs, with minimal LE movements, has boots. Has ataxic upper ext movements.    REFLEXES: 2/4 b/l UE and b/l patellar.    Gait: unable, wheelchair bound.

## 2023-12-19 NOTE — PHYSICAL EXAM
[Ankle Swelling (On Exam)] : not present [Varicose Veins Of Lower Extremities] : not present [] : not present [FreeTextEntry1] : s/p exc dbx st/b RF; skin edges well coapted with sutures in place holding tension with no signs of dehiscence. No drainge. No erythema.

## 2023-12-19 NOTE — DATA REVIEWED
[de-identified] : vEEG which resulted no seizures, with generalized slowing, triphasic waves, with R post quadrant sharp waves that are probably epileptiform and sharp transient. [de-identified] : CT head: Diffuse cortical volume loss with more prominent atrophy of the anterior frontal and temporal lobes with associated ventriculomegaly which is out of proportion to the patient's stated age.

## 2023-12-19 NOTE — ASSESSMENT
[FreeTextEntry1] : 58 RHF with down syndrome down syndrome, Non-verbal, hypothyroidism, DM and osteoporosis presented for initial visit as post hospital evaluation. Had an episode of LOC, during the hospital stay had vEEG which resulted sharp waves and probable epileptiform activities of R post quadrant. She was started on Keppra 500mg BID.  Neurological evaluation is stable.   Recommendations:  Continue with Keppra 500mg BID RTC 6 months.

## 2023-12-19 NOTE — ASSESSMENT
[FreeTextEntry1] : Post-operative f/u R foot  All sutures removed; WOI Continue local wound care betadine, dsd, kerlix, ace q24h  RTC 2 months

## 2023-12-19 NOTE — HISTORY OF PRESENT ILLNESS
[FreeTextEntry1] : 58 RHF with PMHx of down syndrome, Non-verbal, hypothyroidism, DM and osteoporosis presented for initial visit as post hospital evaluation.  He was admitted to hospital couple of weeks ago (10/23) from nursing facility for syncope associated with shaking of upper trunk and UE which was witnessed by aide. Initial vitals at nursing home showed bradycardia and hypoxia for about 10-15 minutes. Aide denies urinary/bowel incontinence, tongue bitting. As per aide at the bedside, shaking episode lasted for few min and then she passed out. Initial vitals at nursing home showed bradycardia and hypoxia for about 10-15 minutes. In the ED, she was hypotension and there was concern about sepsis. found to have R foot osteomyelitis, acute hypoxemic respiratory failure due to aspiration pneumonia.  Was discharged with Keppra 500mg BID and Gabapentin 600mg qd.  He had vEEG which resulted no seizures, with generalized slowing, triphasic waves, with R post quadrant sharp waves that are probably epileptiform and sharp transient.   CT head: Diffuse cortical volume loss with more prominent atrophy of the anterior frontal and temporal lobes with associated ventriculomegaly which is out of proportion to the patient's stated age. Labs 12/23:  B12 476 Keppra 21.6 Gabapentin 3.9 EEG 10/23: Generalized slowing w/o seizure.   Per Aide she is not making sounds like before since discharged from the hospital. Denies any episode of LOC, or seizure-like activity.  Currently on Keppra 500mg BID, Gabapentine 300mg BID, Eliquis 5mg BID for recent DVT.

## 2023-12-19 NOTE — HISTORY OF PRESENT ILLNESS
[FreeTextEntry1] : 58F nonverbal DM patient presents 1 month post-op s/p exc dbx st/b RF; Has sutures in place. Getting daily dressing changes at nursing home. No signs of infection. Denies f/c/n/v/sob.

## 2023-12-27 ENCOUNTER — APPOINTMENT (OUTPATIENT)
Dept: GASTROENTEROLOGY | Facility: CLINIC | Age: 58
End: 2023-12-27
Payer: COMMERCIAL

## 2023-12-27 ENCOUNTER — OUTPATIENT (OUTPATIENT)
Dept: OUTPATIENT SERVICES | Facility: HOSPITAL | Age: 58
LOS: 1 days | End: 2023-12-27
Payer: COMMERCIAL

## 2023-12-27 VITALS — OXYGEN SATURATION: 94 % | HEART RATE: 68 BPM | TEMPERATURE: 98.2 F

## 2023-12-27 DIAGNOSIS — D50.9 IRON DEFICIENCY ANEMIA, UNSPECIFIED: ICD-10-CM

## 2023-12-27 DIAGNOSIS — Z00.00 ENCOUNTER FOR GENERAL ADULT MEDICAL EXAMINATION WITHOUT ABNORMAL FINDINGS: ICD-10-CM

## 2023-12-27 DIAGNOSIS — Z78.9 OTHER SPECIFIED HEALTH STATUS: ICD-10-CM

## 2023-12-27 DIAGNOSIS — Z98.890 OTHER SPECIFIED POSTPROCEDURAL STATES: Chronic | ICD-10-CM

## 2023-12-27 LAB
CULTURE RESULTS: SIGNIFICANT CHANGE UP
SPECIMEN SOURCE: SIGNIFICANT CHANGE UP

## 2023-12-27 PROCEDURE — 99214 OFFICE O/P EST MOD 30 MIN: CPT

## 2023-12-27 NOTE — HISTORY OF PRESENT ILLNESS
[FreeTextEntry1] : 59 y/o female with a pmhx of down syndrome, non-verbal at baseline, hypothyroidism, osteoporosis, Recent DVT (11/16) on Left leg on Eliquis, seizure d/o, T2DM, presents for follow up. Hx was obtained from aide. Pt was admitted for cellulitis in 11/2023 and was incidentally diagnosed with left LEG DVT and was started on eliquis. Per aide, pt has no active GI issues and has regular bms and is tolerating po diet. Pt was scheduled for screening colonoscopy previously but was cancelled due to her hospitalization.

## 2023-12-27 NOTE — END OF VISIT
[] : Fellow [FreeTextEntry3] : Initially evaluated for screening, failed to obtain the endoscopy since she developed DVT. Presenting for TIFFANI. Given non elective indication would consider endoscopic work up off AC (may consider bridging to short acting) to be coordinated after hematology follow  up. Refer to hematology and return afterwards for next step. Hgb relatively stable at the moment.  [Time Spent: ___ minutes] : I have spent [unfilled] minutes of time on the encounter.

## 2023-12-27 NOTE — PHYSICAL EXAM
[Alert] : alert [Normal Voice/Communication] : normal voice/communication [Healthy Appearing] : healthy appearing [No Acute Distress] : no acute distress [Sclera] : the sclera and conjunctiva were normal [Hearing Threshold Finger Rub Not Miner] : hearing was normal [Normal Lips/Gums] : the lips and gums were normal [Oropharynx] : the oropharynx was normal [Normal Appearance] : the appearance of the neck was normal [No Neck Mass] : no neck mass was observed [No Respiratory Distress] : no respiratory distress [No Acc Muscle Use] : no accessory muscle use [Respiration, Rhythm And Depth] : normal respiratory rhythm and effort [Auscultation Breath Sounds / Voice Sounds] : lungs were clear to auscultation bilaterally [Heart Rate And Rhythm] : heart rate was normal and rhythm regular [Normal S1, S2] : normal S1 and S2 [Murmurs] : no murmurs [Bowel Sounds] : normal bowel sounds [Abdomen Tenderness] : non-tender [No Masses] : no abdominal mass palpated [Abdomen Soft] : soft [] : no hepatosplenomegaly [Oriented To Time, Place, And Person] : oriented to person, place, and time

## 2023-12-27 NOTE — ASSESSMENT
[FreeTextEntry1] : 57 y/o female with a pmhx of down syndrome, non-verbal at baseline, hypothyroidism, osteoporosis, Recent DVT (11/16) on Left leg on Eliquis, seizure d/o, T2DM, presents for follow up. Hx was obtained from aide. Pt was admitted for cellulitis in 11/2023 and was incidentally diagnosed with left LEG DVT and was started on eliquis. Per aide, pt has no active GI issues and has regular bms and is tolerating po diet. Pt was scheduled for screening colonoscopy previously but was cancelled due to her hospitalization.  #Iron Deficiency anemia - no overt GI Bleed #Average risk for colon cancer - Hb 10.5. Ferritin 17 and %sat 5 - Pt actively on eliquis for DVT since 11/16  Plan - pt will need egd/colonoscopy - however, this will be elective for patient as per aide pt has no active gi symptoms. As such, will give referral to hematology for possible guidance of bridging Anticoagulation or defer until 6 month course of eliquis is complete - if any overt evidence of bleeding per rectum or mouth - aide advised to bring patient to ED and can consider endoscopic intervention with bridging AC

## 2024-01-01 ENCOUNTER — INPATIENT (INPATIENT)
Facility: HOSPITAL | Age: 59
LOS: 2 days | DRG: 871 | End: 2024-08-06
Attending: STUDENT IN AN ORGANIZED HEALTH CARE EDUCATION/TRAINING PROGRAM | Admitting: STUDENT IN AN ORGANIZED HEALTH CARE EDUCATION/TRAINING PROGRAM
Payer: COMMERCIAL

## 2024-01-01 VITALS — HEART RATE: 23 BPM | OXYGEN SATURATION: 82 %

## 2024-01-01 VITALS — HEART RATE: 167 BPM | OXYGEN SATURATION: 79 %

## 2024-01-01 DIAGNOSIS — Z51.5 ENCOUNTER FOR PALLIATIVE CARE: ICD-10-CM

## 2024-01-01 DIAGNOSIS — A41.9 SEPSIS, UNSPECIFIED ORGANISM: ICD-10-CM

## 2024-01-01 DIAGNOSIS — J18.9 PNEUMONIA, UNSPECIFIED ORGANISM: ICD-10-CM

## 2024-01-01 DIAGNOSIS — Z71.89 OTHER SPECIFIED COUNSELING: ICD-10-CM

## 2024-01-01 LAB
ALBUMIN SERPL ELPH-MCNC: 2.2 G/DL — LOW (ref 3.5–5.2)
ALBUMIN SERPL ELPH-MCNC: 2.3 G/DL — LOW (ref 3.5–5.2)
ALBUMIN SERPL ELPH-MCNC: 2.4 G/DL — LOW (ref 3.5–5.2)
ALBUMIN SERPL ELPH-MCNC: 2.5 G/DL — LOW (ref 3.5–5.2)
ALBUMIN SERPL ELPH-MCNC: 2.6 G/DL — LOW (ref 3.5–5.2)
ALBUMIN SERPL ELPH-MCNC: 3.4 G/DL — LOW (ref 3.5–5.2)
ALP SERPL-CCNC: 136 U/L — HIGH (ref 30–115)
ALP SERPL-CCNC: 49 U/L — SIGNIFICANT CHANGE UP (ref 30–115)
ALP SERPL-CCNC: 63 U/L — SIGNIFICANT CHANGE UP (ref 30–115)
ALP SERPL-CCNC: 70 U/L — SIGNIFICANT CHANGE UP (ref 30–115)
ALP SERPL-CCNC: 80 U/L — SIGNIFICANT CHANGE UP (ref 30–115)
ALP SERPL-CCNC: 98 U/L — SIGNIFICANT CHANGE UP (ref 30–115)
ALT FLD-CCNC: 18 U/L — SIGNIFICANT CHANGE UP (ref 0–41)
ALT FLD-CCNC: 189 U/L — HIGH (ref 0–41)
ALT FLD-CCNC: 23 U/L — SIGNIFICANT CHANGE UP (ref 0–41)
ALT FLD-CCNC: 29 U/L — SIGNIFICANT CHANGE UP (ref 0–41)
ALT FLD-CCNC: 319 U/L — HIGH (ref 0–41)
ALT FLD-CCNC: 76 U/L — HIGH (ref 0–41)
ANION GAP SERPL CALC-SCNC: 13 MMOL/L — SIGNIFICANT CHANGE UP (ref 7–14)
ANION GAP SERPL CALC-SCNC: 15 MMOL/L — HIGH (ref 7–14)
ANION GAP SERPL CALC-SCNC: 16 MMOL/L — HIGH (ref 7–14)
ANION GAP SERPL CALC-SCNC: 18 MMOL/L — HIGH (ref 7–14)
ANION GAP SERPL CALC-SCNC: 21 MMOL/L — HIGH (ref 7–14)
ANION GAP SERPL CALC-SCNC: 23 MMOL/L — HIGH (ref 7–14)
APPEARANCE UR: ABNORMAL
APTT BLD: 26.8 SEC — LOW (ref 27–39.2)
AST SERPL-CCNC: 138 U/L — HIGH (ref 0–41)
AST SERPL-CCNC: 202 U/L — HIGH (ref 0–41)
AST SERPL-CCNC: 227 U/L — HIGH (ref 0–41)
AST SERPL-CCNC: 24 U/L — SIGNIFICANT CHANGE UP (ref 0–41)
AST SERPL-CCNC: 37 U/L — SIGNIFICANT CHANGE UP (ref 0–41)
AST SERPL-CCNC: 82 U/L — HIGH (ref 0–41)
BASE EXCESS BLDA CALC-SCNC: -8 MMOL/L — LOW (ref -2–3)
BASE EXCESS BLDV CALC-SCNC: -0.2 MMOL/L — SIGNIFICANT CHANGE UP (ref -2–3)
BASE EXCESS BLDV CALC-SCNC: -2.5 MMOL/L — LOW (ref -2–3)
BASOPHILS # BLD AUTO: 0 K/UL — SIGNIFICANT CHANGE UP (ref 0–0.2)
BASOPHILS # BLD AUTO: 0 K/UL — SIGNIFICANT CHANGE UP (ref 0–0.2)
BASOPHILS # BLD AUTO: 0.12 K/UL — SIGNIFICANT CHANGE UP (ref 0–0.2)
BASOPHILS # BLD AUTO: 0.13 K/UL — SIGNIFICANT CHANGE UP (ref 0–0.2)
BASOPHILS # BLD AUTO: 0.14 K/UL — SIGNIFICANT CHANGE UP (ref 0–0.2)
BASOPHILS NFR BLD AUTO: 0 % — SIGNIFICANT CHANGE UP (ref 0–1)
BASOPHILS NFR BLD AUTO: 0 % — SIGNIFICANT CHANGE UP (ref 0–1)
BASOPHILS NFR BLD AUTO: 0.5 % — SIGNIFICANT CHANGE UP (ref 0–1)
BASOPHILS NFR BLD AUTO: 0.6 % — SIGNIFICANT CHANGE UP (ref 0–1)
BASOPHILS NFR BLD AUTO: 0.6 % — SIGNIFICANT CHANGE UP (ref 0–1)
BILIRUB SERPL-MCNC: 0.3 MG/DL — SIGNIFICANT CHANGE UP (ref 0.2–1.2)
BILIRUB SERPL-MCNC: 0.6 MG/DL — SIGNIFICANT CHANGE UP (ref 0.2–1.2)
BILIRUB SERPL-MCNC: 0.7 MG/DL — SIGNIFICANT CHANGE UP (ref 0.2–1.2)
BILIRUB SERPL-MCNC: 0.8 MG/DL — SIGNIFICANT CHANGE UP (ref 0.2–1.2)
BILIRUB SERPL-MCNC: 1 MG/DL — SIGNIFICANT CHANGE UP (ref 0.2–1.2)
BILIRUB SERPL-MCNC: 1.2 MG/DL — SIGNIFICANT CHANGE UP (ref 0.2–1.2)
BILIRUB UR-MCNC: ABNORMAL
BUN SERPL-MCNC: 17 MG/DL — SIGNIFICANT CHANGE UP (ref 10–20)
BUN SERPL-MCNC: 19 MG/DL — SIGNIFICANT CHANGE UP (ref 10–20)
BUN SERPL-MCNC: 28 MG/DL — HIGH (ref 10–20)
BUN SERPL-MCNC: 29 MG/DL — HIGH (ref 10–20)
BUN SERPL-MCNC: 32 MG/DL — HIGH (ref 10–20)
BUN SERPL-MCNC: 33 MG/DL — HIGH (ref 10–20)
CA-I SERPL-SCNC: 1.21 MMOL/L — SIGNIFICANT CHANGE UP (ref 1.15–1.33)
CA-I SERPL-SCNC: 1.21 MMOL/L — SIGNIFICANT CHANGE UP (ref 1.15–1.33)
CALCIUM SERPL-MCNC: 7.7 MG/DL — LOW (ref 8.4–10.4)
CALCIUM SERPL-MCNC: 7.8 MG/DL — LOW (ref 8.4–10.5)
CALCIUM SERPL-MCNC: 8.1 MG/DL — LOW (ref 8.4–10.5)
CALCIUM SERPL-MCNC: 8.1 MG/DL — LOW (ref 8.4–10.5)
CALCIUM SERPL-MCNC: 8.2 MG/DL — LOW (ref 8.4–10.5)
CALCIUM SERPL-MCNC: 8.9 MG/DL — SIGNIFICANT CHANGE UP (ref 8.4–10.5)
CHLORIDE SERPL-SCNC: 102 MMOL/L — SIGNIFICANT CHANGE UP (ref 98–110)
CHLORIDE SERPL-SCNC: 105 MMOL/L — SIGNIFICANT CHANGE UP (ref 98–110)
CHLORIDE SERPL-SCNC: 105 MMOL/L — SIGNIFICANT CHANGE UP (ref 98–110)
CHLORIDE SERPL-SCNC: 93 MMOL/L — LOW (ref 98–110)
CHLORIDE SERPL-SCNC: 98 MMOL/L — SIGNIFICANT CHANGE UP (ref 98–110)
CHLORIDE SERPL-SCNC: 99 MMOL/L — SIGNIFICANT CHANGE UP (ref 98–110)
CO2 SERPL-SCNC: 19 MMOL/L — SIGNIFICANT CHANGE UP (ref 17–32)
CO2 SERPL-SCNC: 24 MMOL/L — SIGNIFICANT CHANGE UP (ref 17–32)
CO2 SERPL-SCNC: 25 MMOL/L — SIGNIFICANT CHANGE UP (ref 17–32)
CO2 SERPL-SCNC: 30 MMOL/L — SIGNIFICANT CHANGE UP (ref 17–32)
COLOR SPEC: SIGNIFICANT CHANGE UP
CREAT SERPL-MCNC: 0.5 MG/DL — LOW (ref 0.7–1.5)
CREAT SERPL-MCNC: 0.7 MG/DL — SIGNIFICANT CHANGE UP (ref 0.7–1.5)
CREAT SERPL-MCNC: 0.7 MG/DL — SIGNIFICANT CHANGE UP (ref 0.7–1.5)
CREAT SERPL-MCNC: 1 MG/DL — SIGNIFICANT CHANGE UP (ref 0.7–1.5)
CREAT SERPL-MCNC: 1.2 MG/DL — SIGNIFICANT CHANGE UP (ref 0.7–1.5)
CREAT SERPL-MCNC: 1.2 MG/DL — SIGNIFICANT CHANGE UP (ref 0.7–1.5)
DIFF PNL FLD: ABNORMAL
EGFR: 100 ML/MIN/1.73M2 — SIGNIFICANT CHANGE UP
EGFR: 100 ML/MIN/1.73M2 — SIGNIFICANT CHANGE UP
EGFR: 109 ML/MIN/1.73M2 — SIGNIFICANT CHANGE UP
EGFR: 52 ML/MIN/1.73M2 — LOW
EGFR: 52 ML/MIN/1.73M2 — LOW
EGFR: 65 ML/MIN/1.73M2 — SIGNIFICANT CHANGE UP
EOSINOPHIL # BLD AUTO: 0 K/UL — SIGNIFICANT CHANGE UP (ref 0–0.7)
EOSINOPHIL # BLD AUTO: 0.01 K/UL — SIGNIFICANT CHANGE UP (ref 0–0.7)
EOSINOPHIL NFR BLD AUTO: 0 % — SIGNIFICANT CHANGE UP (ref 0–8)
GAS PNL BLDA: SIGNIFICANT CHANGE UP
GAS PNL BLDV: 135 MMOL/L — LOW (ref 136–145)
GAS PNL BLDV: 137 MMOL/L — SIGNIFICANT CHANGE UP (ref 136–145)
GAS PNL BLDV: SIGNIFICANT CHANGE UP
GLUCOSE BLDC GLUCOMTR-MCNC: 121 MG/DL — HIGH (ref 70–99)
GLUCOSE BLDC GLUCOMTR-MCNC: 151 MG/DL — HIGH (ref 70–99)
GLUCOSE BLDC GLUCOMTR-MCNC: 156 MG/DL — HIGH (ref 70–99)
GLUCOSE BLDC GLUCOMTR-MCNC: 182 MG/DL — HIGH (ref 70–99)
GLUCOSE BLDC GLUCOMTR-MCNC: 199 MG/DL — HIGH (ref 70–99)
GLUCOSE BLDC GLUCOMTR-MCNC: 269 MG/DL — HIGH (ref 70–99)
GLUCOSE BLDC GLUCOMTR-MCNC: 80 MG/DL — SIGNIFICANT CHANGE UP (ref 70–99)
GLUCOSE SERPL-MCNC: 134 MG/DL — HIGH (ref 70–99)
GLUCOSE SERPL-MCNC: 142 MG/DL — HIGH (ref 70–99)
GLUCOSE SERPL-MCNC: 150 MG/DL — HIGH (ref 70–99)
GLUCOSE SERPL-MCNC: 198 MG/DL — HIGH (ref 70–99)
GLUCOSE SERPL-MCNC: 243 MG/DL — HIGH (ref 70–99)
GLUCOSE SERPL-MCNC: 52 MG/DL — CRITICAL LOW (ref 70–99)
GLUCOSE UR QL: NEGATIVE MG/DL — SIGNIFICANT CHANGE UP
HCO3 BLDA-SCNC: 20 MMOL/L — LOW (ref 21–28)
HCO3 BLDV-SCNC: 27 MMOL/L — SIGNIFICANT CHANGE UP (ref 22–29)
HCO3 BLDV-SCNC: 28 MMOL/L — SIGNIFICANT CHANGE UP (ref 22–29)
HCT VFR BLD CALC: 31.2 % — LOW (ref 37–47)
HCT VFR BLD CALC: 31.6 % — LOW (ref 37–47)
HCT VFR BLD CALC: 34 % — LOW (ref 37–47)
HCT VFR BLD CALC: 34 % — LOW (ref 37–47)
HCT VFR BLD CALC: 40.6 % — SIGNIFICANT CHANGE UP (ref 37–47)
HCT VFR BLD CALC: 42.6 % — SIGNIFICANT CHANGE UP (ref 37–47)
HCT VFR BLDA CALC: 37 % — SIGNIFICANT CHANGE UP (ref 34.5–46.5)
HCT VFR BLDA CALC: 40 % — SIGNIFICANT CHANGE UP (ref 34.5–46.5)
HGB BLD CALC-MCNC: 12.3 G/DL — SIGNIFICANT CHANGE UP (ref 11.7–16.1)
HGB BLD CALC-MCNC: 13.2 G/DL — SIGNIFICANT CHANGE UP (ref 11.7–16.1)
HGB BLD-MCNC: 10 G/DL — LOW (ref 12–16)
HGB BLD-MCNC: 10.1 G/DL — LOW (ref 12–16)
HGB BLD-MCNC: 10.1 G/DL — LOW (ref 12–16)
HGB BLD-MCNC: 12.5 G/DL — SIGNIFICANT CHANGE UP (ref 12–16)
HGB BLD-MCNC: 12.9 G/DL — SIGNIFICANT CHANGE UP (ref 12–16)
HGB BLD-MCNC: 9.7 G/DL — LOW (ref 12–16)
IMM GRANULOCYTES NFR BLD AUTO: 0.5 % — HIGH (ref 0.1–0.3)
IMM GRANULOCYTES NFR BLD AUTO: 4.2 % — HIGH (ref 0.1–0.3)
IMM GRANULOCYTES NFR BLD AUTO: 9.6 % — HIGH (ref 0.1–0.3)
INR BLD: 1.1 RATIO — SIGNIFICANT CHANGE UP (ref 0.65–1.3)
KETONES UR-MCNC: ABNORMAL MG/DL
LACTATE BLDV-MCNC: 3.9 MMOL/L — HIGH (ref 0.5–2)
LACTATE BLDV-MCNC: 5.1 MMOL/L — CRITICAL HIGH (ref 0.5–2)
LACTATE SERPL-SCNC: 3 MMOL/L — HIGH (ref 0.7–2)
LACTATE SERPL-SCNC: 7.9 MMOL/L — CRITICAL HIGH (ref 0.7–2)
LACTATE SERPL-SCNC: 8.4 MMOL/L — CRITICAL HIGH (ref 0.7–2)
LEGIONELLA AG UR QL: NEGATIVE — SIGNIFICANT CHANGE UP
LEUKOCYTE ESTERASE UR-ACNC: ABNORMAL
LYMPHOCYTES # BLD AUTO: 0.56 K/UL — LOW (ref 1.2–3.4)
LYMPHOCYTES # BLD AUTO: 0.56 K/UL — LOW (ref 1.2–3.4)
LYMPHOCYTES # BLD AUTO: 0.65 K/UL — LOW (ref 1.2–3.4)
LYMPHOCYTES # BLD AUTO: 0.68 K/UL — LOW (ref 1.2–3.4)
LYMPHOCYTES # BLD AUTO: 11.3 % — LOW (ref 20.5–51.1)
LYMPHOCYTES # BLD AUTO: 2.2 % — LOW (ref 20.5–51.1)
LYMPHOCYTES # BLD AUTO: 2.9 % — LOW (ref 20.5–51.1)
LYMPHOCYTES # BLD AUTO: 3.37 K/UL — SIGNIFICANT CHANGE UP (ref 1.2–3.4)
LYMPHOCYTES # BLD AUTO: 3.5 % — LOW (ref 20.5–51.1)
LYMPHOCYTES # BLD AUTO: 5.2 % — LOW (ref 20.5–51.1)
MAGNESIUM SERPL-MCNC: 1.6 MG/DL — LOW (ref 1.8–2.4)
MAGNESIUM SERPL-MCNC: 1.6 MG/DL — LOW (ref 1.8–2.4)
MAGNESIUM SERPL-MCNC: 2 MG/DL — SIGNIFICANT CHANGE UP (ref 1.8–2.4)
MAGNESIUM SERPL-MCNC: 2 MG/DL — SIGNIFICANT CHANGE UP (ref 1.8–2.4)
MCHC RBC-ENTMCNC: 28.5 G/DL — LOW (ref 32–37)
MCHC RBC-ENTMCNC: 29.1 PG — SIGNIFICANT CHANGE UP (ref 27–31)
MCHC RBC-ENTMCNC: 29.4 PG — SIGNIFICANT CHANGE UP (ref 27–31)
MCHC RBC-ENTMCNC: 29.7 G/DL — LOW (ref 32–37)
MCHC RBC-ENTMCNC: 29.8 PG — SIGNIFICANT CHANGE UP (ref 27–31)
MCHC RBC-ENTMCNC: 29.9 PG — SIGNIFICANT CHANGE UP (ref 27–31)
MCHC RBC-ENTMCNC: 30.1 PG — SIGNIFICANT CHANGE UP (ref 27–31)
MCHC RBC-ENTMCNC: 30.3 G/DL — LOW (ref 32–37)
MCHC RBC-ENTMCNC: 30.6 PG — SIGNIFICANT CHANGE UP (ref 27–31)
MCHC RBC-ENTMCNC: 30.8 G/DL — LOW (ref 32–37)
MCHC RBC-ENTMCNC: 32 G/DL — SIGNIFICANT CHANGE UP (ref 32–37)
MCHC RBC-ENTMCNC: 32.1 G/DL — SIGNIFICANT CHANGE UP (ref 32–37)
MCV RBC AUTO: 100.3 FL — HIGH (ref 81–99)
MCV RBC AUTO: 102.1 FL — HIGH (ref 81–99)
MCV RBC AUTO: 93.4 FL — SIGNIFICANT CHANGE UP (ref 81–99)
MCV RBC AUTO: 95.8 FL — SIGNIFICANT CHANGE UP (ref 81–99)
MCV RBC AUTO: 97 FL — SIGNIFICANT CHANGE UP (ref 81–99)
MCV RBC AUTO: 97.8 FL — SIGNIFICANT CHANGE UP (ref 81–99)
MONOCYTES # BLD AUTO: 0.15 K/UL — SIGNIFICANT CHANGE UP (ref 0.1–0.6)
MONOCYTES # BLD AUTO: 0.38 K/UL — SIGNIFICANT CHANGE UP (ref 0.1–0.6)
MONOCYTES # BLD AUTO: 0.5 K/UL — SIGNIFICANT CHANGE UP (ref 0.1–0.6)
MONOCYTES # BLD AUTO: 0.51 K/UL — SIGNIFICANT CHANGE UP (ref 0.1–0.6)
MONOCYTES # BLD AUTO: 0.76 K/UL — HIGH (ref 0.1–0.6)
MONOCYTES NFR BLD AUTO: 0.7 % — LOW (ref 1.7–9.3)
MONOCYTES NFR BLD AUTO: 1.7 % — SIGNIFICANT CHANGE UP (ref 1.7–9.3)
MONOCYTES NFR BLD AUTO: 2.6 % — SIGNIFICANT CHANGE UP (ref 1.7–9.3)
MONOCYTES NFR BLD AUTO: 2.9 % — SIGNIFICANT CHANGE UP (ref 1.7–9.3)
MONOCYTES NFR BLD AUTO: 3.5 % — SIGNIFICANT CHANGE UP (ref 1.7–9.3)
MRSA PCR RESULT.: NEGATIVE — SIGNIFICANT CHANGE UP
NEUTROPHILS # BLD AUTO: 17.87 K/UL — HIGH (ref 1.4–6.5)
NEUTROPHILS # BLD AUTO: 19.08 K/UL — HIGH (ref 1.4–6.5)
NEUTROPHILS # BLD AUTO: 23.32 K/UL — HIGH (ref 1.4–6.5)
NEUTROPHILS # BLD AUTO: 25.93 K/UL — HIGH (ref 1.4–6.5)
NEUTROPHILS # BLD AUTO: 6.89 K/UL — HIGH (ref 1.4–6.5)
NEUTROPHILS NFR BLD AUTO: 53.4 % — SIGNIFICANT CHANGE UP (ref 42.2–75.2)
NEUTROPHILS NFR BLD AUTO: 86.2 % — HIGH (ref 42.2–75.2)
NEUTROPHILS NFR BLD AUTO: 87 % — HIGH (ref 42.2–75.2)
NEUTROPHILS NFR BLD AUTO: 90.2 % — HIGH (ref 42.2–75.2)
NEUTROPHILS NFR BLD AUTO: 92.8 % — HIGH (ref 42.2–75.2)
NITRITE UR-MCNC: NEGATIVE — SIGNIFICANT CHANGE UP
NRBC # BLD: 0 /100 WBCS — SIGNIFICANT CHANGE UP (ref 0–0)
NRBC # BLD: 1 /100 WBCS — HIGH (ref 0–0)
NRBC # BLD: SIGNIFICANT CHANGE UP /100 WBCS (ref 0–0)
PCO2 BLDA: 52 MMHG — HIGH (ref 32–45)
PCO2 BLDV: 61 MMHG — HIGH (ref 39–42)
PCO2 BLDV: 71 MMHG — CRITICAL HIGH (ref 39–42)
PH BLDA: 7.2 — CRITICAL LOW (ref 7.35–7.45)
PH BLDV: 7.19 — CRITICAL LOW (ref 7.32–7.43)
PH BLDV: 7.27 — LOW (ref 7.32–7.43)
PH UR: 5.5 — SIGNIFICANT CHANGE UP (ref 5–8)
PLATELET # BLD AUTO: 103 K/UL — LOW (ref 130–400)
PLATELET # BLD AUTO: 166 K/UL — SIGNIFICANT CHANGE UP (ref 130–400)
PLATELET # BLD AUTO: 227 K/UL — SIGNIFICANT CHANGE UP (ref 130–400)
PLATELET # BLD AUTO: 284 K/UL — SIGNIFICANT CHANGE UP (ref 130–400)
PLATELET # BLD AUTO: 417 K/UL — HIGH (ref 130–400)
PLATELET # BLD AUTO: 64 K/UL — LOW (ref 130–400)
PMV BLD: 10.5 FL — HIGH (ref 7.4–10.4)
PMV BLD: 11.2 FL — HIGH (ref 7.4–10.4)
PMV BLD: 11.3 FL — HIGH (ref 7.4–10.4)
PMV BLD: 11.5 FL — HIGH (ref 7.4–10.4)
PMV BLD: 11.9 FL — HIGH (ref 7.4–10.4)
PMV BLD: 13 FL — HIGH (ref 7.4–10.4)
PO2 BLDA: 66 MMHG — LOW (ref 83–108)
PO2 BLDV: 39 MMHG — SIGNIFICANT CHANGE UP (ref 25–45)
PO2 BLDV: 51 MMHG — HIGH (ref 25–45)
POTASSIUM BLDV-SCNC: 4.3 MMOL/L — SIGNIFICANT CHANGE UP (ref 3.5–5.1)
POTASSIUM BLDV-SCNC: 4.6 MMOL/L — SIGNIFICANT CHANGE UP (ref 3.5–5.1)
POTASSIUM SERPL-MCNC: 2.9 MMOL/L — LOW (ref 3.5–5)
POTASSIUM SERPL-MCNC: 3.7 MMOL/L — SIGNIFICANT CHANGE UP (ref 3.5–5)
POTASSIUM SERPL-MCNC: 3.8 MMOL/L — SIGNIFICANT CHANGE UP (ref 3.5–5)
POTASSIUM SERPL-MCNC: 4.8 MMOL/L — SIGNIFICANT CHANGE UP (ref 3.5–5)
POTASSIUM SERPL-MCNC: 4.9 MMOL/L — SIGNIFICANT CHANGE UP (ref 3.5–5)
POTASSIUM SERPL-MCNC: 5 MMOL/L — SIGNIFICANT CHANGE UP (ref 3.5–5)
POTASSIUM SERPL-MCNC: 5.6 MMOL/L — HIGH (ref 3.5–5)
POTASSIUM SERPL-SCNC: 2.9 MMOL/L — LOW (ref 3.5–5)
POTASSIUM SERPL-SCNC: 3.7 MMOL/L — SIGNIFICANT CHANGE UP (ref 3.5–5)
POTASSIUM SERPL-SCNC: 3.8 MMOL/L — SIGNIFICANT CHANGE UP (ref 3.5–5)
POTASSIUM SERPL-SCNC: 4.8 MMOL/L — SIGNIFICANT CHANGE UP (ref 3.5–5)
POTASSIUM SERPL-SCNC: 4.9 MMOL/L — SIGNIFICANT CHANGE UP (ref 3.5–5)
POTASSIUM SERPL-SCNC: 5 MMOL/L — SIGNIFICANT CHANGE UP (ref 3.5–5)
POTASSIUM SERPL-SCNC: 5.6 MMOL/L — HIGH (ref 3.5–5)
PROCALCITONIN SERPL-MCNC: 99.5 NG/ML — HIGH (ref 0.02–0.1)
PROT SERPL-MCNC: 4.9 G/DL — LOW (ref 6–8)
PROT SERPL-MCNC: 4.9 G/DL — LOW (ref 6–8)
PROT SERPL-MCNC: 5 G/DL — LOW (ref 6–8)
PROT SERPL-MCNC: 5.4 G/DL — LOW (ref 6–8)
PROT SERPL-MCNC: 5.6 G/DL — LOW (ref 6–8)
PROT SERPL-MCNC: 7.4 G/DL — SIGNIFICANT CHANGE UP (ref 6–8)
PROT UR-MCNC: 300 MG/DL
PROTHROM AB SERPL-ACNC: 12.5 SEC — SIGNIFICANT CHANGE UP (ref 9.95–12.87)
RBC # BLD: 3.3 M/UL — LOW (ref 4.2–5.4)
RBC # BLD: 3.33 M/UL — LOW (ref 4.2–5.4)
RBC # BLD: 3.34 M/UL — LOW (ref 4.2–5.4)
RBC # BLD: 3.39 M/UL — LOW (ref 4.2–5.4)
RBC # BLD: 4.15 M/UL — LOW (ref 4.2–5.4)
RBC # BLD: 4.39 M/UL — SIGNIFICANT CHANGE UP (ref 4.2–5.4)
RBC # FLD: 17 % — HIGH (ref 11.5–14.5)
RBC # FLD: 17.2 % — HIGH (ref 11.5–14.5)
RBC # FLD: 17.6 % — HIGH (ref 11.5–14.5)
RBC # FLD: 17.8 % — HIGH (ref 11.5–14.5)
RBC # FLD: 17.8 % — HIGH (ref 11.5–14.5)
RBC # FLD: 17.9 % — HIGH (ref 11.5–14.5)
S PNEUM AG UR QL: NEGATIVE — SIGNIFICANT CHANGE UP
SAO2 % BLDA: 91.6 % — LOW (ref 94–98)
SAO2 % BLDV: 52.4 % — LOW (ref 67–88)
SAO2 % BLDV: 71.9 % — SIGNIFICANT CHANGE UP (ref 67–88)
SODIUM SERPL-SCNC: 136 MMOL/L — SIGNIFICANT CHANGE UP (ref 135–146)
SODIUM SERPL-SCNC: 140 MMOL/L — SIGNIFICANT CHANGE UP (ref 135–146)
SODIUM SERPL-SCNC: 141 MMOL/L — SIGNIFICANT CHANGE UP (ref 135–146)
SODIUM SERPL-SCNC: 142 MMOL/L — SIGNIFICANT CHANGE UP (ref 135–146)
SODIUM SERPL-SCNC: 142 MMOL/L — SIGNIFICANT CHANGE UP (ref 135–146)
SODIUM SERPL-SCNC: 143 MMOL/L — SIGNIFICANT CHANGE UP (ref 135–146)
SP GR SPEC: >1.03 — HIGH (ref 1–1.03)
UROBILINOGEN FLD QL: 0.2 MG/DL — SIGNIFICANT CHANGE UP (ref 0.2–1)
WBC # BLD: 10.81 K/UL — HIGH (ref 4.8–10.8)
WBC # BLD: 16.4 K/UL — HIGH (ref 4.8–10.8)
WBC # BLD: 19.26 K/UL — HIGH (ref 4.8–10.8)
WBC # BLD: 22.14 K/UL — HIGH (ref 4.8–10.8)
WBC # BLD: 25.86 K/UL — HIGH (ref 4.8–10.8)
WBC # BLD: 29.81 K/UL — HIGH (ref 4.8–10.8)
WBC # FLD AUTO: 10.81 K/UL — HIGH (ref 4.8–10.8)
WBC # FLD AUTO: 16.4 K/UL — HIGH (ref 4.8–10.8)
WBC # FLD AUTO: 19.26 K/UL — HIGH (ref 4.8–10.8)
WBC # FLD AUTO: 22.14 K/UL — HIGH (ref 4.8–10.8)
WBC # FLD AUTO: 25.86 K/UL — HIGH (ref 4.8–10.8)
WBC # FLD AUTO: 29.81 K/UL — HIGH (ref 4.8–10.8)

## 2024-01-01 PROCEDURE — 99291 CRITICAL CARE FIRST HOUR: CPT

## 2024-01-01 PROCEDURE — P9016: CPT

## 2024-01-01 PROCEDURE — 94640 AIRWAY INHALATION TREATMENT: CPT

## 2024-01-01 PROCEDURE — 87640 STAPH A DNA AMP PROBE: CPT

## 2024-01-01 PROCEDURE — 80053 COMPREHEN METABOLIC PANEL: CPT

## 2024-01-01 PROCEDURE — 94660 CPAP INITIATION&MGMT: CPT

## 2024-01-01 PROCEDURE — 71045 X-RAY EXAM CHEST 1 VIEW: CPT | Mod: 26

## 2024-01-01 PROCEDURE — 71045 X-RAY EXAM CHEST 1 VIEW: CPT

## 2024-01-01 PROCEDURE — 71045 X-RAY EXAM CHEST 1 VIEW: CPT | Mod: 26,77

## 2024-01-01 PROCEDURE — 87086 URINE CULTURE/COLONY COUNT: CPT

## 2024-01-01 PROCEDURE — 84132 ASSAY OF SERUM POTASSIUM: CPT

## 2024-01-01 PROCEDURE — 86901 BLOOD TYPING SEROLOGIC RH(D): CPT

## 2024-01-01 PROCEDURE — 93010 ELECTROCARDIOGRAM REPORT: CPT

## 2024-01-01 PROCEDURE — 99292 CRITICAL CARE ADDL 30 MIN: CPT

## 2024-01-01 PROCEDURE — 99223 1ST HOSP IP/OBS HIGH 75: CPT

## 2024-01-01 PROCEDURE — 99497 ADVNCD CARE PLAN 30 MIN: CPT

## 2024-01-01 PROCEDURE — 99233 SBSQ HOSP IP/OBS HIGH 50: CPT

## 2024-01-01 PROCEDURE — 86900 BLOOD TYPING SEROLOGIC ABO: CPT

## 2024-01-01 PROCEDURE — 84295 ASSAY OF SERUM SODIUM: CPT

## 2024-01-01 PROCEDURE — 83605 ASSAY OF LACTIC ACID: CPT

## 2024-01-01 PROCEDURE — 82330 ASSAY OF CALCIUM: CPT

## 2024-01-01 PROCEDURE — 85018 HEMOGLOBIN: CPT

## 2024-01-01 PROCEDURE — 36415 COLL VENOUS BLD VENIPUNCTURE: CPT

## 2024-01-01 PROCEDURE — 87449 NOS EACH ORGANISM AG IA: CPT

## 2024-01-01 PROCEDURE — 74018 RADEX ABDOMEN 1 VIEW: CPT | Mod: 26

## 2024-01-01 PROCEDURE — 36430 TRANSFUSION BLD/BLD COMPNT: CPT

## 2024-01-01 PROCEDURE — 94760 N-INVAS EAR/PLS OXIMETRY 1: CPT

## 2024-01-01 PROCEDURE — 86850 RBC ANTIBODY SCREEN: CPT

## 2024-01-01 PROCEDURE — 85014 HEMATOCRIT: CPT

## 2024-01-01 PROCEDURE — 81001 URINALYSIS AUTO W/SCOPE: CPT

## 2024-01-01 PROCEDURE — 87641 MR-STAPH DNA AMP PROBE: CPT

## 2024-01-01 PROCEDURE — 99223 1ST HOSP IP/OBS HIGH 75: CPT | Mod: 25

## 2024-01-01 PROCEDURE — 85027 COMPLETE CBC AUTOMATED: CPT

## 2024-01-01 PROCEDURE — 99253 IP/OBS CNSLTJ NEW/EST LOW 45: CPT

## 2024-01-01 PROCEDURE — 71045 X-RAY EXAM CHEST 1 VIEW: CPT | Mod: 26,76

## 2024-01-01 PROCEDURE — 83735 ASSAY OF MAGNESIUM: CPT

## 2024-01-01 PROCEDURE — 85025 COMPLETE CBC W/AUTO DIFF WBC: CPT

## 2024-01-01 PROCEDURE — 87899 AGENT NOS ASSAY W/OPTIC: CPT

## 2024-01-01 PROCEDURE — 86923 COMPATIBILITY TEST ELECTRIC: CPT

## 2024-01-01 PROCEDURE — 82803 BLOOD GASES ANY COMBINATION: CPT

## 2024-01-01 PROCEDURE — 84145 PROCALCITONIN (PCT): CPT

## 2024-01-01 PROCEDURE — 93005 ELECTROCARDIOGRAM TRACING: CPT

## 2024-01-01 PROCEDURE — 87186 SC STD MICRODIL/AGAR DIL: CPT

## 2024-01-01 PROCEDURE — 82962 GLUCOSE BLOOD TEST: CPT

## 2024-01-01 PROCEDURE — 87077 CULTURE AEROBIC IDENTIFY: CPT

## 2024-01-01 PROCEDURE — 99497 ADVNCD CARE PLAN 30 MIN: CPT | Mod: 25

## 2024-01-01 PROCEDURE — 74018 RADEX ABDOMEN 1 VIEW: CPT

## 2024-01-01 RX ORDER — ACETAMINOPHEN 500 MG
700 TABLET ORAL ONCE
Refills: 0 | Status: COMPLETED | OUTPATIENT
Start: 2024-01-01 | End: 2024-01-01

## 2024-01-01 RX ORDER — DEXTROSE MONOHYDRATE, SODIUM CHLORIDE, SODIUM LACTATE, CALCIUM CHLORIDE, MAGNESIUM CHLORIDE 1.5; 538; 448; 18.4; 5.08 G/100ML; MG/100ML; MG/100ML; MG/100ML; MG/100ML
1000 SOLUTION INTRAPERITONEAL
Refills: 0 | Status: DISCONTINUED | OUTPATIENT
Start: 2024-01-01 | End: 2024-01-01

## 2024-01-01 RX ORDER — VASOPRESSIN 40 [USP'U]/100ML
0.04 INJECTION INTRAVENOUS
Qty: 40 | Refills: 0 | Status: DISCONTINUED | OUTPATIENT
Start: 2024-01-01 | End: 2024-01-01

## 2024-01-01 RX ORDER — IPRATROPIUM BROMIDE AND ALBUTEROL SULFATE 2.5; .5 MG/3ML; MG/3ML
3 SOLUTION RESPIRATORY (INHALATION) ONCE
Refills: 0 | Status: COMPLETED | OUTPATIENT
Start: 2024-01-01 | End: 2024-01-01

## 2024-01-01 RX ORDER — DEXTROSE MONOHYDRATE, SODIUM CHLORIDE, SODIUM LACTATE, CALCIUM CHLORIDE, MAGNESIUM CHLORIDE 1.5; 538; 448; 18.4; 5.08 G/100ML; MG/100ML; MG/100ML; MG/100ML; MG/100ML
1000 SOLUTION INTRAPERITONEAL ONCE
Refills: 0 | Status: COMPLETED | OUTPATIENT
Start: 2024-01-01 | End: 2024-01-01

## 2024-01-01 RX ORDER — MIDODRINE HYDROCHLORIDE 2.5 MG/1
10 TABLET ORAL ONCE
Refills: 0 | Status: COMPLETED | OUTPATIENT
Start: 2024-01-01 | End: 2024-01-01

## 2024-01-01 RX ORDER — RALOXIFENE HYDROCHLORIDE 60 MG/1
60 TABLET, FILM COATED ORAL DAILY
Refills: 0 | Status: DISCONTINUED | OUTPATIENT
Start: 2024-01-01 | End: 2024-01-01

## 2024-01-01 RX ORDER — ACETAMINOPHEN 500 MG
975 TABLET ORAL ONCE
Refills: 0 | Status: COMPLETED | OUTPATIENT
Start: 2024-01-01 | End: 2024-01-01

## 2024-01-01 RX ORDER — MIDODRINE HYDROCHLORIDE 2.5 MG/1
1 TABLET ORAL
Refills: 0 | DISCHARGE

## 2024-01-01 RX ORDER — FAMOTIDINE 40 MG/1
1 TABLET, FILM COATED ORAL
Refills: 0 | DISCHARGE

## 2024-01-01 RX ORDER — ACETAMINOPHEN 500 MG
650 TABLET ORAL ONCE
Refills: 0 | Status: COMPLETED | OUTPATIENT
Start: 2024-01-01 | End: 2024-01-01

## 2024-01-01 RX ORDER — DEXTROSE 4 G
12.5 TABLET,CHEWABLE ORAL ONCE
Refills: 0 | Status: COMPLETED | OUTPATIENT
Start: 2024-01-01 | End: 2024-01-01

## 2024-01-01 RX ORDER — SODIUM BICARBONATE 0.9MEQ/ML
100 SYRINGE (ML) INTRAVENOUS ONCE
Refills: 0 | Status: COMPLETED | OUTPATIENT
Start: 2024-01-01 | End: 2024-01-01

## 2024-01-01 RX ORDER — MELATONIN 3 MG
1 TABLET ORAL
Refills: 0 | DISCHARGE

## 2024-01-01 RX ORDER — PANTOPRAZOLE SODIUM 20 MG/1
1 TABLET, DELAYED RELEASE ORAL
Refills: 0 | DISCHARGE

## 2024-01-01 RX ORDER — ACETAMINOPHEN 500 MG
1000 TABLET ORAL ONCE
Refills: 0 | Status: COMPLETED | OUTPATIENT
Start: 2024-01-01 | End: 2024-01-01

## 2024-01-01 RX ORDER — MEROPENEM 1 G/20ML
1000 INJECTION, POWDER, FOR SOLUTION INTRAVENOUS EVERY 8 HOURS
Refills: 0 | Status: DISCONTINUED | OUTPATIENT
Start: 2024-01-01 | End: 2024-01-01

## 2024-01-01 RX ORDER — PANTOPRAZOLE SODIUM 20 MG/1
80 TABLET, DELAYED RELEASE ORAL ONCE
Refills: 0 | Status: COMPLETED | OUTPATIENT
Start: 2024-01-01 | End: 2024-01-01

## 2024-01-01 RX ORDER — SODIUM BICARBONATE 0.9MEQ/ML
0.32 SYRINGE (ML) INTRAVENOUS
Qty: 150 | Refills: 0 | Status: DISCONTINUED | OUTPATIENT
Start: 2024-01-01 | End: 2024-01-01

## 2024-01-01 RX ORDER — GABAPENTIN 400 MG/1
300 CAPSULE ORAL
Refills: 0 | Status: DISCONTINUED | OUTPATIENT
Start: 2024-01-01 | End: 2024-01-01

## 2024-01-01 RX ORDER — SODIUM BICARBONATE 0.9MEQ/ML
50 SYRINGE (ML) INTRAVENOUS ONCE
Refills: 0 | Status: COMPLETED | OUTPATIENT
Start: 2024-01-01 | End: 2024-01-01

## 2024-01-01 RX ORDER — GABAPENTIN 400 MG/1
6 CAPSULE ORAL
Refills: 0 | DISCHARGE

## 2024-01-01 RX ORDER — POTASSIUM CHLORIDE 1500 MG/1
40 TABLET, EXTENDED RELEASE ORAL
Refills: 0 | Status: COMPLETED | OUTPATIENT
Start: 2024-01-01 | End: 2024-01-01

## 2024-01-01 RX ORDER — DOCUSATE SODIUM AND SENNOSIDES 8.6; 5 MG/1; MG/1
2 TABLET ORAL
Refills: 0 | DISCHARGE

## 2024-01-01 RX ORDER — PANTOPRAZOLE SODIUM 20 MG/1
40 TABLET, DELAYED RELEASE ORAL
Refills: 0 | Status: DISCONTINUED | OUTPATIENT
Start: 2024-01-01 | End: 2024-01-01

## 2024-01-01 RX ORDER — PHENYLEPHRINE HYDROCHLORIDE 10 MG/1
0.1 TABLET, FILM COATED ORAL
Qty: 40 | Refills: 0 | Status: DISCONTINUED | OUTPATIENT
Start: 2024-01-01 | End: 2024-01-01

## 2024-01-01 RX ORDER — LINEZOLID 600 MG/300ML
600 INJECTION, SOLUTION INTRAVENOUS EVERY 12 HOURS
Refills: 0 | Status: DISCONTINUED | OUTPATIENT
Start: 2024-01-01 | End: 2024-01-01

## 2024-01-01 RX ORDER — MAGNESIUM SULFATE 500 MG/ML
2 VIAL (ML) INJECTION ONCE
Refills: 0 | Status: COMPLETED | OUTPATIENT
Start: 2024-01-01 | End: 2024-01-01

## 2024-01-01 RX ORDER — DEXTROSE MONOHYDRATE, SODIUM CHLORIDE, SODIUM LACTATE, CALCIUM CHLORIDE, MAGNESIUM CHLORIDE 1.5; 538; 448; 18.4; 5.08 G/100ML; MG/100ML; MG/100ML; MG/100ML; MG/100ML
500 SOLUTION INTRAPERITONEAL ONCE
Refills: 0 | Status: COMPLETED | OUTPATIENT
Start: 2024-01-01 | End: 2024-01-01

## 2024-01-01 RX ORDER — VANCOMYCIN HYDROCHLORIDE 5 G/100ML
1000 INJECTION, POWDER, LYOPHILIZED, FOR SOLUTION INTRAVENOUS ONCE
Refills: 0 | Status: COMPLETED | OUTPATIENT
Start: 2024-01-01 | End: 2024-01-01

## 2024-01-01 RX ORDER — MEROPENEM 1 G/20ML
1000 INJECTION, POWDER, FOR SOLUTION INTRAVENOUS ONCE
Refills: 0 | Status: COMPLETED | OUTPATIENT
Start: 2024-01-01 | End: 2024-01-01

## 2024-01-01 RX ORDER — RALOXIFENE HYDROCHLORIDE 60 MG/1
1 TABLET, FILM COATED ORAL
Refills: 0 | DISCHARGE

## 2024-01-01 RX ORDER — IPRATROPIUM BROMIDE AND ALBUTEROL SULFATE 2.5; .5 MG/3ML; MG/3ML
3 SOLUTION RESPIRATORY (INHALATION)
Refills: 0 | DISCHARGE

## 2024-01-01 RX ORDER — LEVETIRACETAM 1000 MG/1
750 TABLET, FILM COATED ORAL
Refills: 0 | Status: DISCONTINUED | OUTPATIENT
Start: 2024-01-01 | End: 2024-01-01

## 2024-01-01 RX ORDER — VANCOMYCIN HYDROCHLORIDE 5 G/100ML
1000 INJECTION, POWDER, LYOPHILIZED, FOR SOLUTION INTRAVENOUS EVERY 12 HOURS
Refills: 0 | Status: DISCONTINUED | OUTPATIENT
Start: 2024-01-01 | End: 2024-01-01

## 2024-01-01 RX ORDER — HYDROCORTISONE ACETATE
100 POWDER (GRAM) MISCELLANEOUS EVERY 8 HOURS
Refills: 0 | Status: DISCONTINUED | OUTPATIENT
Start: 2024-01-01 | End: 2024-01-01

## 2024-01-01 RX ORDER — CHLORHEXIDINE GLUCONATE 500 MG/1
1 CLOTH TOPICAL
Refills: 0 | Status: DISCONTINUED | OUTPATIENT
Start: 2024-01-01 | End: 2024-01-01

## 2024-01-01 RX ORDER — BUPIVACAINE HCL/0.9 % NACL/PF 0.25 %
0.05 PLASTIC BAG, INJECTION (ML) EPIDURAL
Qty: 16 | Refills: 0 | Status: DISCONTINUED | OUTPATIENT
Start: 2024-01-01 | End: 2024-01-01

## 2024-01-01 RX ORDER — ONDANSETRON HCL/PF 4 MG/2 ML
4 VIAL (ML) INJECTION ONCE
Refills: 0 | Status: COMPLETED | OUTPATIENT
Start: 2024-01-01 | End: 2024-01-01

## 2024-01-01 RX ORDER — PHENYLEPHRINE HYDROCHLORIDE 10 MG/1
5 TABLET, FILM COATED ORAL
Qty: 160 | Refills: 0 | Status: DISCONTINUED | OUTPATIENT
Start: 2024-01-01 | End: 2024-01-01

## 2024-01-01 RX ORDER — PANTOPRAZOLE SODIUM 20 MG/1
40 TABLET, DELAYED RELEASE ORAL EVERY 12 HOURS
Refills: 0 | Status: DISCONTINUED | OUTPATIENT
Start: 2024-01-01 | End: 2024-01-01

## 2024-01-01 RX ORDER — CEFEPIME HYDROCHLORIDE 1 G/1
2000 INJECTION, POWDER, FOR SOLUTION INTRAMUSCULAR; INTRAVENOUS ONCE
Refills: 0 | Status: COMPLETED | OUTPATIENT
Start: 2024-01-01 | End: 2024-01-01

## 2024-01-01 RX ORDER — HYDROMORPHONE HCL IN 0.9% NACL 0.2 MG/ML
0.5 PLASTIC BAG, INJECTION (ML) INTRAVENOUS ONCE
Refills: 0 | Status: DISCONTINUED | OUTPATIENT
Start: 2024-01-01 | End: 2024-01-01

## 2024-01-01 RX ORDER — LEVETIRACETAM 1000 MG/1
1000 TABLET, FILM COATED ORAL
Refills: 0 | DISCHARGE

## 2024-01-01 RX ORDER — MIDODRINE HYDROCHLORIDE 2.5 MG/1
10 TABLET ORAL THREE TIMES A DAY
Refills: 0 | Status: DISCONTINUED | OUTPATIENT
Start: 2024-01-01 | End: 2024-01-01

## 2024-01-01 RX ORDER — ENOXAPARIN SODIUM 120 MG/.8ML
40 INJECTION SUBCUTANEOUS EVERY 24 HOURS
Refills: 0 | Status: DISCONTINUED | OUTPATIENT
Start: 2024-01-01 | End: 2024-01-01

## 2024-01-01 RX ORDER — SODIUM ZIRCONIUM CYCLOSILICATE 10 G/10G
10 POWDER, FOR SUSPENSION ORAL ONCE
Refills: 0 | Status: COMPLETED | OUTPATIENT
Start: 2024-01-01 | End: 2024-01-01

## 2024-01-01 RX ORDER — INSULIN LISPRO 100/ML
4 VIAL (ML) SUBCUTANEOUS ONCE
Refills: 0 | Status: COMPLETED | OUTPATIENT
Start: 2024-01-01 | End: 2024-01-01

## 2024-01-01 RX ORDER — CALCIUM CARBONATE/VITAMIN D2 600-3.125
1 TABLET ORAL
Refills: 0 | DISCHARGE

## 2024-01-01 RX ORDER — ACETAMINOPHEN 500 MG
650 TABLET ORAL EVERY 6 HOURS
Refills: 0 | Status: COMPLETED | OUTPATIENT
Start: 2024-01-01 | End: 2024-01-01

## 2024-01-01 RX ORDER — ESMOLOL HCL 100MG/10ML
50 VIAL (ML) INTRAVENOUS
Qty: 2500 | Refills: 0 | Status: DISCONTINUED | OUTPATIENT
Start: 2024-01-01 | End: 2024-01-01

## 2024-01-01 RX ORDER — DEXMEDETOMIDINE HYDROCHLORIDE 4 UG/ML
0.3 INJECTION, SOLUTION INTRAVENOUS
Qty: 200 | Refills: 0 | Status: DISCONTINUED | OUTPATIENT
Start: 2024-01-01 | End: 2024-01-01

## 2024-01-01 RX ORDER — SODIUM BICARBONATE 0.9MEQ/ML
0.48 SYRINGE (ML) INTRAVENOUS
Qty: 150 | Refills: 0 | Status: DISCONTINUED | OUTPATIENT
Start: 2024-01-01 | End: 2024-01-01

## 2024-01-01 RX ADMIN — Medication 975 MILLIGRAM(S): at 01:28

## 2024-01-01 RX ADMIN — Medication 100 MILLIGRAM(S): at 05:36

## 2024-01-01 RX ADMIN — DEXTROSE MONOHYDRATE, SODIUM CHLORIDE, SODIUM LACTATE, CALCIUM CHLORIDE, MAGNESIUM CHLORIDE 50 MILLILITER(S): 1.5; 538; 448; 18.4; 5.08 SOLUTION INTRAPERITONEAL at 20:28

## 2024-01-01 RX ADMIN — Medication 100 MILLIGRAM(S): at 13:13

## 2024-01-01 RX ADMIN — Medication 650 MILLIGRAM(S): at 18:00

## 2024-01-01 RX ADMIN — Medication 1000 MILLIGRAM(S): at 23:49

## 2024-01-01 RX ADMIN — VASOPRESSIN 6 UNIT(S)/MIN: 40 INJECTION INTRAVENOUS at 05:50

## 2024-01-01 RX ADMIN — Medication 100 MILLIGRAM(S): at 09:42

## 2024-01-01 RX ADMIN — Medication 25 GRAM(S): at 07:45

## 2024-01-01 RX ADMIN — DEXMEDETOMIDINE HYDROCHLORIDE 3.53 MICROGRAM(S)/KG/HR: 4 INJECTION, SOLUTION INTRAVENOUS at 14:46

## 2024-01-01 RX ADMIN — GABAPENTIN 300 MILLIGRAM(S): 400 CAPSULE ORAL at 05:26

## 2024-01-01 RX ADMIN — Medication 100 MILLIGRAM(S): at 21:44

## 2024-01-01 RX ADMIN — LEVETIRACETAM 400 MILLIGRAM(S): 1000 TABLET, FILM COATED ORAL at 05:35

## 2024-01-01 RX ADMIN — MIDODRINE HYDROCHLORIDE 10 MILLIGRAM(S): 2.5 TABLET ORAL at 11:54

## 2024-01-01 RX ADMIN — Medication 650 MILLIGRAM(S): at 16:53

## 2024-01-01 RX ADMIN — Medication 400 MILLIGRAM(S): at 23:19

## 2024-01-01 RX ADMIN — GABAPENTIN 300 MILLIGRAM(S): 400 CAPSULE ORAL at 17:25

## 2024-01-01 RX ADMIN — Medication 650 MILLIGRAM(S): at 23:44

## 2024-01-01 RX ADMIN — Medication 650 MILLIGRAM(S): at 12:00

## 2024-01-01 RX ADMIN — MIDODRINE HYDROCHLORIDE 10 MILLIGRAM(S): 2.5 TABLET ORAL at 05:08

## 2024-01-01 RX ADMIN — PANTOPRAZOLE SODIUM 40 MILLIGRAM(S): 20 TABLET, DELAYED RELEASE ORAL at 17:57

## 2024-01-01 RX ADMIN — DEXTROSE MONOHYDRATE, SODIUM CHLORIDE, SODIUM LACTATE, CALCIUM CHLORIDE, MAGNESIUM CHLORIDE 1000 MILLILITER(S): 1.5; 538; 448; 18.4; 5.08 SOLUTION INTRAPERITONEAL at 01:28

## 2024-01-01 RX ADMIN — LINEZOLID 300 MILLIGRAM(S): 600 INJECTION, SOLUTION INTRAVENOUS at 05:25

## 2024-01-01 RX ADMIN — PANTOPRAZOLE SODIUM 40 MILLIGRAM(S): 20 TABLET, DELAYED RELEASE ORAL at 05:07

## 2024-01-01 RX ADMIN — DEXTROSE MONOHYDRATE, SODIUM CHLORIDE, SODIUM LACTATE, CALCIUM CHLORIDE, MAGNESIUM CHLORIDE 1000 MILLILITER(S): 1.5; 538; 448; 18.4; 5.08 SOLUTION INTRAPERITONEAL at 02:10

## 2024-01-01 RX ADMIN — Medication 14.1 MICROGRAM(S)/KG/MIN: at 03:18

## 2024-01-01 RX ADMIN — DEXTROSE MONOHYDRATE, SODIUM CHLORIDE, SODIUM LACTATE, CALCIUM CHLORIDE, MAGNESIUM CHLORIDE 1000 MILLILITER(S): 1.5; 538; 448; 18.4; 5.08 SOLUTION INTRAPERITONEAL at 08:41

## 2024-01-01 RX ADMIN — Medication 150 MEQ/KG/HR: at 13:00

## 2024-01-01 RX ADMIN — Medication 700 MILLIGRAM(S): at 00:24

## 2024-01-01 RX ADMIN — Medication 650 MILLIGRAM(S): at 01:00

## 2024-01-01 RX ADMIN — Medication 100 MILLIEQUIVALENT(S): at 09:37

## 2024-01-01 RX ADMIN — Medication 650 MILLIGRAM(S): at 17:00

## 2024-01-01 RX ADMIN — DEXTROSE MONOHYDRATE, SODIUM CHLORIDE, SODIUM LACTATE, CALCIUM CHLORIDE, MAGNESIUM CHLORIDE 100 MILLILITER(S): 1.5; 538; 448; 18.4; 5.08 SOLUTION INTRAPERITONEAL at 07:40

## 2024-01-01 RX ADMIN — MEROPENEM 100 MILLIGRAM(S): 1 INJECTION, POWDER, FOR SOLUTION INTRAVENOUS at 20:32

## 2024-01-01 RX ADMIN — Medication 2.2 MICROGRAM(S)/KG/MIN: at 21:56

## 2024-01-01 RX ADMIN — MIDODRINE HYDROCHLORIDE 10 MILLIGRAM(S): 2.5 TABLET ORAL at 16:17

## 2024-01-01 RX ADMIN — LEVETIRACETAM 400 MILLIGRAM(S): 1000 TABLET, FILM COATED ORAL at 18:00

## 2024-01-01 RX ADMIN — MIDODRINE HYDROCHLORIDE 10 MILLIGRAM(S): 2.5 TABLET ORAL at 17:56

## 2024-01-01 RX ADMIN — Medication 650 MILLIGRAM(S): at 16:00

## 2024-01-01 RX ADMIN — DEXMEDETOMIDINE HYDROCHLORIDE 3.53 MICROGRAM(S)/KG/HR: 4 INJECTION, SOLUTION INTRAVENOUS at 21:56

## 2024-01-01 RX ADMIN — CEFEPIME HYDROCHLORIDE 100 MILLIGRAM(S): 1 INJECTION, POWDER, FOR SOLUTION INTRAMUSCULAR; INTRAVENOUS at 01:28

## 2024-01-01 RX ADMIN — PANTOPRAZOLE SODIUM 80 MILLIGRAM(S): 20 TABLET, DELAYED RELEASE ORAL at 09:42

## 2024-01-01 RX ADMIN — CHLORHEXIDINE GLUCONATE 1 APPLICATION(S): 500 CLOTH TOPICAL at 05:51

## 2024-01-01 RX ADMIN — MEROPENEM 100 MILLIGRAM(S): 1 INJECTION, POWDER, FOR SOLUTION INTRAVENOUS at 05:37

## 2024-01-01 RX ADMIN — Medication 4 MILLIGRAM(S): at 04:33

## 2024-01-01 RX ADMIN — Medication 650 MILLIGRAM(S): at 11:00

## 2024-01-01 RX ADMIN — VASOPRESSIN 6 UNIT(S)/MIN: 40 INJECTION INTRAVENOUS at 21:11

## 2024-01-01 RX ADMIN — Medication 975 MILLIGRAM(S): at 01:58

## 2024-01-01 RX ADMIN — Medication 650 MILLIGRAM(S): at 19:00

## 2024-01-01 RX ADMIN — PHENYLEPHRINE HYDROCHLORIDE 44 MICROGRAM(S)/KG/MIN: 10 TABLET, FILM COATED ORAL at 03:17

## 2024-01-01 RX ADMIN — CHLORHEXIDINE GLUCONATE 1 APPLICATION(S): 500 CLOTH TOPICAL at 05:08

## 2024-01-01 RX ADMIN — PHENYLEPHRINE HYDROCHLORIDE 1.76 MICROGRAM(S)/KG/MIN: 10 TABLET, FILM COATED ORAL at 04:29

## 2024-01-01 RX ADMIN — LEVETIRACETAM 400 MILLIGRAM(S): 1000 TABLET, FILM COATED ORAL at 07:38

## 2024-01-01 RX ADMIN — Medication 280 MILLIGRAM(S): at 23:59

## 2024-01-01 RX ADMIN — LEVETIRACETAM 400 MILLIGRAM(S): 1000 TABLET, FILM COATED ORAL at 05:27

## 2024-01-01 RX ADMIN — VASOPRESSIN 6 UNIT(S)/MIN: 40 INJECTION INTRAVENOUS at 06:00

## 2024-01-01 RX ADMIN — PHENYLEPHRINE HYDROCHLORIDE 1.76 MICROGRAM(S)/KG/MIN: 10 TABLET, FILM COATED ORAL at 07:40

## 2024-01-01 RX ADMIN — LEVETIRACETAM 400 MILLIGRAM(S): 1000 TABLET, FILM COATED ORAL at 05:07

## 2024-01-01 RX ADMIN — LINEZOLID 300 MILLIGRAM(S): 600 INJECTION, SOLUTION INTRAVENOUS at 17:24

## 2024-01-01 RX ADMIN — GABAPENTIN 300 MILLIGRAM(S): 400 CAPSULE ORAL at 05:36

## 2024-01-01 RX ADMIN — MEROPENEM 100 MILLIGRAM(S): 1 INJECTION, POWDER, FOR SOLUTION INTRAVENOUS at 05:25

## 2024-01-01 RX ADMIN — Medication 650 MILLIGRAM(S): at 10:00

## 2024-01-01 RX ADMIN — MIDODRINE HYDROCHLORIDE 10 MILLIGRAM(S): 2.5 TABLET ORAL at 05:36

## 2024-01-01 RX ADMIN — Medication 0.5 MILLIGRAM(S): at 11:20

## 2024-01-01 RX ADMIN — MEROPENEM 100 MILLIGRAM(S): 1 INJECTION, POWDER, FOR SOLUTION INTRAVENOUS at 21:44

## 2024-01-01 RX ADMIN — Medication 100 MILLIGRAM(S): at 22:34

## 2024-01-01 RX ADMIN — Medication 650 MILLIGRAM(S): at 16:23

## 2024-01-01 RX ADMIN — LEVETIRACETAM 400 MILLIGRAM(S): 1000 TABLET, FILM COATED ORAL at 17:36

## 2024-01-01 RX ADMIN — PHENYLEPHRINE HYDROCHLORIDE 44 MICROGRAM(S)/KG/MIN: 10 TABLET, FILM COATED ORAL at 21:47

## 2024-01-01 RX ADMIN — CHLORHEXIDINE GLUCONATE 1 APPLICATION(S): 500 CLOTH TOPICAL at 05:26

## 2024-01-01 RX ADMIN — Medication 2.2 MICROGRAM(S)/KG/MIN: at 09:42

## 2024-01-01 RX ADMIN — DEXTROSE MONOHYDRATE, SODIUM CHLORIDE, SODIUM LACTATE, CALCIUM CHLORIDE, MAGNESIUM CHLORIDE 1000 MILLILITER(S): 1.5; 538; 448; 18.4; 5.08 SOLUTION INTRAPERITONEAL at 06:57

## 2024-01-01 RX ADMIN — Medication 100 MILLIGRAM(S): at 17:29

## 2024-01-01 RX ADMIN — GABAPENTIN 300 MILLIGRAM(S): 400 CAPSULE ORAL at 17:55

## 2024-01-01 RX ADMIN — MIDODRINE HYDROCHLORIDE 10 MILLIGRAM(S): 2.5 TABLET ORAL at 05:27

## 2024-01-01 RX ADMIN — VANCOMYCIN HYDROCHLORIDE 250 MILLIGRAM(S): 5 INJECTION, POWDER, LYOPHILIZED, FOR SOLUTION INTRAVENOUS at 03:48

## 2024-01-01 RX ADMIN — GABAPENTIN 300 MILLIGRAM(S): 400 CAPSULE ORAL at 05:08

## 2024-01-01 RX ADMIN — MIDODRINE HYDROCHLORIDE 10 MILLIGRAM(S): 2.5 TABLET ORAL at 17:37

## 2024-01-01 RX ADMIN — Medication 2.2 MICROGRAM(S)/KG/MIN: at 04:33

## 2024-01-01 RX ADMIN — IPRATROPIUM BROMIDE AND ALBUTEROL SULFATE 3 MILLILITER(S): 2.5; .5 SOLUTION RESPIRATORY (INHALATION) at 14:58

## 2024-01-01 RX ADMIN — DEXTROSE MONOHYDRATE, SODIUM CHLORIDE, SODIUM LACTATE, CALCIUM CHLORIDE, MAGNESIUM CHLORIDE 1000 MILLILITER(S): 1.5; 538; 448; 18.4; 5.08 SOLUTION INTRAPERITONEAL at 04:05

## 2024-01-01 RX ADMIN — PANTOPRAZOLE SODIUM 40 MILLIGRAM(S): 20 TABLET, DELAYED RELEASE ORAL at 17:24

## 2024-01-01 RX ADMIN — Medication 50 MILLIEQUIVALENT(S): at 13:20

## 2024-01-01 RX ADMIN — PHENYLEPHRINE HYDROCHLORIDE 44 MICROGRAM(S)/KG/MIN: 10 TABLET, FILM COATED ORAL at 13:00

## 2024-01-01 RX ADMIN — MEROPENEM 100 MILLIGRAM(S): 1 INJECTION, POWDER, FOR SOLUTION INTRAVENOUS at 21:11

## 2024-01-01 RX ADMIN — MEROPENEM 100 MILLIGRAM(S): 1 INJECTION, POWDER, FOR SOLUTION INTRAVENOUS at 13:13

## 2024-01-01 RX ADMIN — LINEZOLID 300 MILLIGRAM(S): 600 INJECTION, SOLUTION INTRAVENOUS at 05:36

## 2024-01-01 RX ADMIN — Medication 150 MEQ/KG/HR: at 21:00

## 2024-01-01 RX ADMIN — Medication 100 MILLIGRAM(S): at 13:43

## 2024-01-01 RX ADMIN — Medication 2.2 MICROGRAM(S)/KG/MIN: at 20:33

## 2024-01-01 RX ADMIN — LEVETIRACETAM 400 MILLIGRAM(S): 1000 TABLET, FILM COATED ORAL at 17:24

## 2024-01-01 RX ADMIN — POTASSIUM CHLORIDE 40 MILLIEQUIVALENT(S): 1500 TABLET, EXTENDED RELEASE ORAL at 08:51

## 2024-01-01 RX ADMIN — PHENYLEPHRINE HYDROCHLORIDE 44 MICROGRAM(S)/KG/MIN: 10 TABLET, FILM COATED ORAL at 20:17

## 2024-01-01 RX ADMIN — LINEZOLID 300 MILLIGRAM(S): 600 INJECTION, SOLUTION INTRAVENOUS at 17:36

## 2024-01-01 RX ADMIN — PANTOPRAZOLE SODIUM 40 MILLIGRAM(S): 20 TABLET, DELAYED RELEASE ORAL at 05:26

## 2024-01-01 RX ADMIN — Medication 100 MILLIGRAM(S): at 05:35

## 2024-01-01 RX ADMIN — PANTOPRAZOLE SODIUM 40 MILLIGRAM(S): 20 TABLET, DELAYED RELEASE ORAL at 17:37

## 2024-01-01 RX ADMIN — PANTOPRAZOLE SODIUM 40 MILLIGRAM(S): 20 TABLET, DELAYED RELEASE ORAL at 05:36

## 2024-01-01 RX ADMIN — Medication 4 MILLIGRAM(S): at 01:29

## 2024-01-01 RX ADMIN — Medication 12.5 GRAM(S): at 07:51

## 2024-01-01 RX ADMIN — DEXTROSE MONOHYDRATE, SODIUM CHLORIDE, SODIUM LACTATE, CALCIUM CHLORIDE, MAGNESIUM CHLORIDE 1000 MILLILITER(S): 1.5; 538; 448; 18.4; 5.08 SOLUTION INTRAPERITONEAL at 13:51

## 2024-01-01 RX ADMIN — PHENYLEPHRINE HYDROCHLORIDE 1.76 MICROGRAM(S)/KG/MIN: 10 TABLET, FILM COATED ORAL at 04:02

## 2024-01-01 RX ADMIN — LINEZOLID 300 MILLIGRAM(S): 600 INJECTION, SOLUTION INTRAVENOUS at 05:04

## 2024-01-01 RX ADMIN — MIDODRINE HYDROCHLORIDE 10 MILLIGRAM(S): 2.5 TABLET ORAL at 11:36

## 2024-01-01 RX ADMIN — MEROPENEM 100 MILLIGRAM(S): 1 INJECTION, POWDER, FOR SOLUTION INTRAVENOUS at 05:04

## 2024-01-01 RX ADMIN — POTASSIUM CHLORIDE 40 MILLIEQUIVALENT(S): 1500 TABLET, EXTENDED RELEASE ORAL at 05:42

## 2024-01-01 RX ADMIN — MIDODRINE HYDROCHLORIDE 10 MILLIGRAM(S): 2.5 TABLET ORAL at 13:22

## 2024-01-01 RX ADMIN — Medication 100 MILLIGRAM(S): at 05:07

## 2024-01-01 RX ADMIN — Medication 14.1 MICROGRAM(S)/KG/MIN: at 20:28

## 2024-01-01 RX ADMIN — DEXMEDETOMIDINE HYDROCHLORIDE 3.53 MICROGRAM(S)/KG/HR: 4 INJECTION, SOLUTION INTRAVENOUS at 05:51

## 2024-01-01 RX ADMIN — GABAPENTIN 300 MILLIGRAM(S): 400 CAPSULE ORAL at 17:37

## 2024-01-01 RX ADMIN — Medication 2.2 MICROGRAM(S)/KG/MIN: at 01:14

## 2024-01-01 RX ADMIN — VASOPRESSIN 6 UNIT(S)/MIN: 40 INJECTION INTRAVENOUS at 09:41

## 2024-01-01 RX ADMIN — PHENYLEPHRINE HYDROCHLORIDE 44 MICROGRAM(S)/KG/MIN: 10 TABLET, FILM COATED ORAL at 20:28

## 2024-01-01 RX ADMIN — SODIUM ZIRCONIUM CYCLOSILICATE 10 GRAM(S): 10 POWDER, FOR SUSPENSION ORAL at 17:55

## 2024-01-01 RX ADMIN — LINEZOLID 300 MILLIGRAM(S): 600 INJECTION, SOLUTION INTRAVENOUS at 18:36

## 2024-01-01 RX ADMIN — Medication 100 MILLIGRAM(S): at 21:11

## 2024-01-01 RX ADMIN — Medication 4 UNIT(S): at 00:28

## 2024-01-01 RX ADMIN — MEROPENEM 100 MILLIGRAM(S): 1 INJECTION, POWDER, FOR SOLUTION INTRAVENOUS at 13:43

## 2024-01-01 RX ADMIN — MEROPENEM 100 MILLIGRAM(S): 1 INJECTION, POWDER, FOR SOLUTION INTRAVENOUS at 06:55

## 2024-01-01 RX ADMIN — Medication 2.2 MICROGRAM(S)/KG/MIN: at 05:35

## 2024-01-01 RX ADMIN — Medication 650 MILLIGRAM(S): at 11:37

## 2024-01-08 DIAGNOSIS — Z78.9 OTHER SPECIFIED HEALTH STATUS: ICD-10-CM

## 2024-01-08 DIAGNOSIS — D50.9 IRON DEFICIENCY ANEMIA, UNSPECIFIED: ICD-10-CM

## 2024-01-12 ENCOUNTER — OUTPATIENT (OUTPATIENT)
Dept: OUTPATIENT SERVICES | Facility: HOSPITAL | Age: 59
LOS: 1 days | End: 2024-01-12
Payer: COMMERCIAL

## 2024-01-12 ENCOUNTER — APPOINTMENT (OUTPATIENT)
Dept: PODIATRY | Facility: CLINIC | Age: 59
End: 2024-01-12
Payer: COMMERCIAL
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
## 2024-01-12 DIAGNOSIS — L97.412 NON-PRESSURE CHRONIC ULCER OF RIGHT HEEL AND MIDFOOT WITH FAT LAYER EXPOSED: ICD-10-CM

## 2024-01-12 DIAGNOSIS — Z98.890 OTHER SPECIFIED POSTPROCEDURAL STATES: Chronic | ICD-10-CM

## 2024-01-12 DIAGNOSIS — Z00.00 ENCOUNTER FOR GENERAL ADULT MEDICAL EXAMINATION WITHOUT ABNORMAL FINDINGS: ICD-10-CM
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
## 2024-01-12 PROCEDURE — 99213 OFFICE O/P EST LOW 20 MIN: CPT

## 2024-01-12 PROCEDURE — 11721 DEBRIDE NAIL 6 OR MORE: CPT | Mod: 79

## 2024-01-12 PROCEDURE — 99213 OFFICE O/P EST LOW 20 MIN: CPT | Mod: 24,25

## 2024-01-12 NOTE — ASSESSMENT
[FreeTextEntry1] :  Patient examined, history and chart reviewed Debridement of all diseased nails x 10 Patient tolerated procedure well Continue local wound care betadine right heel dsd, kerlix, ace q24h  heel cups to be worn while in chair or in the bed Follow up in 3 months or PRN   RTC 2 months  [Caregiver] : caregiver

## 2024-01-12 NOTE — HISTORY OF PRESENT ILLNESS
[Post-op Sandals] : post-op sandals [Wheelchair] : a wheelchair [FreeTextEntry1] : 58F nonverbal  patient wheelchair bound follow up for nail care and right foot pressure ulcer

## 2024-01-12 NOTE — PHYSICAL EXAM
[Ankle Swelling (On Exam)] : not present [Varicose Veins Of Lower Extremities] : not present [] : not present [2+] : left foot dorsalis pedis 2+ [FreeTextEntry1] : right foot necrotic patch on heel

## 2024-01-20 ENCOUNTER — INPATIENT (INPATIENT)
Facility: HOSPITAL | Age: 59
LOS: 78 days | Discharge: HOME | DRG: 871 | End: 2024-04-08
Attending: INTERNAL MEDICINE | Admitting: STUDENT IN AN ORGANIZED HEALTH CARE EDUCATION/TRAINING PROGRAM
Payer: COMMERCIAL

## 2024-01-20 VITALS — HEART RATE: 110 BPM | OXYGEN SATURATION: 98 %

## 2024-01-20 DIAGNOSIS — Z98.890 OTHER SPECIFIED POSTPROCEDURAL STATES: Chronic | ICD-10-CM

## 2024-01-20 DIAGNOSIS — A41.9 SEPSIS, UNSPECIFIED ORGANISM: ICD-10-CM

## 2024-01-20 LAB
ALBUMIN SERPL ELPH-MCNC: 3.2 G/DL — LOW (ref 3.5–5.2)
ALP SERPL-CCNC: 103 U/L — SIGNIFICANT CHANGE UP (ref 30–115)
ALT FLD-CCNC: 25 U/L — SIGNIFICANT CHANGE UP (ref 0–41)
ANION GAP SERPL CALC-SCNC: 9 MMOL/L — SIGNIFICANT CHANGE UP (ref 7–14)
APTT BLD: 38.6 SEC — SIGNIFICANT CHANGE UP (ref 27–39.2)
AST SERPL-CCNC: 25 U/L — SIGNIFICANT CHANGE UP (ref 0–41)
BASE EXCESS BLDV CALC-SCNC: 3.3 MMOL/L — HIGH (ref -2–3)
BASOPHILS # BLD AUTO: 0.05 K/UL — SIGNIFICANT CHANGE UP (ref 0–0.2)
BASOPHILS NFR BLD AUTO: 0.6 % — SIGNIFICANT CHANGE UP (ref 0–1)
BILIRUB SERPL-MCNC: <0.2 MG/DL — SIGNIFICANT CHANGE UP (ref 0.2–1.2)
BLD GP AB SCN SERPL QL: SIGNIFICANT CHANGE UP
BUN SERPL-MCNC: 15 MG/DL — SIGNIFICANT CHANGE UP (ref 10–20)
CA-I SERPL-SCNC: 1.11 MMOL/L — LOW (ref 1.15–1.33)
CALCIUM SERPL-MCNC: 7.7 MG/DL — LOW (ref 8.4–10.5)
CHLORIDE SERPL-SCNC: 103 MMOL/L — SIGNIFICANT CHANGE UP (ref 98–110)
CO2 SERPL-SCNC: 29 MMOL/L — SIGNIFICANT CHANGE UP (ref 17–32)
CREAT SERPL-MCNC: 0.9 MG/DL — SIGNIFICANT CHANGE UP (ref 0.7–1.5)
D DIMER BLD IA.RAPID-MCNC: 605 NG/ML DDU — HIGH
EGFR: 75 ML/MIN/1.73M2 — SIGNIFICANT CHANGE UP
EOSINOPHIL # BLD AUTO: 0.09 K/UL — SIGNIFICANT CHANGE UP (ref 0–0.7)
EOSINOPHIL NFR BLD AUTO: 1 % — SIGNIFICANT CHANGE UP (ref 0–8)
GAS PNL BLDV: 134 MMOL/L — LOW (ref 136–145)
GAS PNL BLDV: SIGNIFICANT CHANGE UP
GLUCOSE SERPL-MCNC: 136 MG/DL — HIGH (ref 70–99)
HCO3 BLDV-SCNC: 31 MMOL/L — HIGH (ref 22–29)
HCT VFR BLD CALC: 30.8 % — LOW (ref 37–47)
HCT VFR BLDA CALC: 46 % — SIGNIFICANT CHANGE UP (ref 34.5–46.5)
HGB BLD CALC-MCNC: 15.2 G/DL — SIGNIFICANT CHANGE UP (ref 11.7–16.1)
HGB BLD-MCNC: 9.5 G/DL — LOW (ref 12–16)
IMM GRANULOCYTES NFR BLD AUTO: 0.8 % — HIGH (ref 0.1–0.3)
INR BLD: 1.43 RATIO — HIGH (ref 0.65–1.3)
LACTATE BLDV-MCNC: 2.1 MMOL/L — HIGH (ref 0.5–2)
LACTATE SERPL-SCNC: 1.6 MMOL/L — SIGNIFICANT CHANGE UP (ref 0.7–2)
LYMPHOCYTES # BLD AUTO: 0.74 K/UL — LOW (ref 1.2–3.4)
LYMPHOCYTES # BLD AUTO: 8.6 % — LOW (ref 20.5–51.1)
MCHC RBC-ENTMCNC: 25 PG — LOW (ref 27–31)
MCHC RBC-ENTMCNC: 30.8 G/DL — LOW (ref 32–37)
MCV RBC AUTO: 81.1 FL — SIGNIFICANT CHANGE UP (ref 81–99)
MONOCYTES # BLD AUTO: 0.41 K/UL — SIGNIFICANT CHANGE UP (ref 0.1–0.6)
MONOCYTES NFR BLD AUTO: 4.8 % — SIGNIFICANT CHANGE UP (ref 1.7–9.3)
NEUTROPHILS # BLD AUTO: 7.25 K/UL — HIGH (ref 1.4–6.5)
NEUTROPHILS NFR BLD AUTO: 84.2 % — HIGH (ref 42.2–75.2)
NRBC # BLD: 0 /100 WBCS — SIGNIFICANT CHANGE UP (ref 0–0)
PCO2 BLDV: 61 MMHG — HIGH (ref 39–42)
PH BLDV: 7.32 — SIGNIFICANT CHANGE UP (ref 7.32–7.43)
PLATELET # BLD AUTO: 422 K/UL — HIGH (ref 130–400)
PMV BLD: 9.4 FL — SIGNIFICANT CHANGE UP (ref 7.4–10.4)
PO2 BLDV: 33 MMHG — SIGNIFICANT CHANGE UP (ref 25–45)
POTASSIUM BLDV-SCNC: 3.7 MMOL/L — SIGNIFICANT CHANGE UP (ref 3.5–5.1)
POTASSIUM SERPL-MCNC: 4.5 MMOL/L — SIGNIFICANT CHANGE UP (ref 3.5–5)
POTASSIUM SERPL-SCNC: 4.5 MMOL/L — SIGNIFICANT CHANGE UP (ref 3.5–5)
PROT SERPL-MCNC: 6.5 G/DL — SIGNIFICANT CHANGE UP (ref 6–8)
PROTHROM AB SERPL-ACNC: 16.4 SEC — HIGH (ref 9.95–12.87)
RBC # BLD: 3.8 M/UL — LOW (ref 4.2–5.4)
RBC # FLD: 18.3 % — HIGH (ref 11.5–14.5)
SAO2 % BLDV: 46.3 % — LOW (ref 67–88)
SODIUM SERPL-SCNC: 141 MMOL/L — SIGNIFICANT CHANGE UP (ref 135–146)
TROPONIN T, HIGH SENSITIVITY RESULT: 25 NG/L — HIGH (ref 6–13)
TROPONIN T, HIGH SENSITIVITY RESULT: 37 NG/L — HIGH (ref 6–13)
WBC # BLD: 8.61 K/UL — SIGNIFICANT CHANGE UP (ref 4.8–10.8)
WBC # FLD AUTO: 8.61 K/UL — SIGNIFICANT CHANGE UP (ref 4.8–10.8)

## 2024-01-20 PROCEDURE — 82330 ASSAY OF CALCIUM: CPT

## 2024-01-20 PROCEDURE — 84295 ASSAY OF SERUM SODIUM: CPT

## 2024-01-20 PROCEDURE — 85730 THROMBOPLASTIN TIME PARTIAL: CPT

## 2024-01-20 PROCEDURE — 83516 IMMUNOASSAY NONANTIBODY: CPT

## 2024-01-20 PROCEDURE — 80053 COMPREHEN METABOLIC PANEL: CPT

## 2024-01-20 PROCEDURE — 95816 EEG AWAKE AND DROWSY: CPT

## 2024-01-20 PROCEDURE — 81003 URINALYSIS AUTO W/O SCOPE: CPT

## 2024-01-20 PROCEDURE — 85379 FIBRIN DEGRADATION QUANT: CPT

## 2024-01-20 PROCEDURE — 82607 VITAMIN B-12: CPT

## 2024-01-20 PROCEDURE — 85025 COMPLETE CBC W/AUTO DIFF WBC: CPT

## 2024-01-20 PROCEDURE — 81001 URINALYSIS AUTO W/SCOPE: CPT

## 2024-01-20 PROCEDURE — 85014 HEMATOCRIT: CPT

## 2024-01-20 PROCEDURE — 80048 BASIC METABOLIC PNL TOTAL CA: CPT

## 2024-01-20 PROCEDURE — 87305 ASPERGILLUS AG IA: CPT

## 2024-01-20 PROCEDURE — 84145 PROCALCITONIN (PCT): CPT

## 2024-01-20 PROCEDURE — 74018 RADEX ABDOMEN 1 VIEW: CPT

## 2024-01-20 PROCEDURE — 84466 ASSAY OF TRANSFERRIN: CPT

## 2024-01-20 PROCEDURE — 82803 BLOOD GASES ANY COMBINATION: CPT

## 2024-01-20 PROCEDURE — 93307 TTE W/O DOPPLER COMPLETE: CPT

## 2024-01-20 PROCEDURE — 71045 X-RAY EXAM CHEST 1 VIEW: CPT

## 2024-01-20 PROCEDURE — C9113: CPT

## 2024-01-20 PROCEDURE — 85610 PROTHROMBIN TIME: CPT

## 2024-01-20 PROCEDURE — 87899 AGENT NOS ASSAY W/OPTIC: CPT

## 2024-01-20 PROCEDURE — 86900 BLOOD TYPING SEROLOGIC ABO: CPT

## 2024-01-20 PROCEDURE — 97162 PT EVAL MOD COMPLEX 30 MIN: CPT | Mod: GP

## 2024-01-20 PROCEDURE — 94760 N-INVAS EAR/PLS OXIMETRY 1: CPT

## 2024-01-20 PROCEDURE — 80202 ASSAY OF VANCOMYCIN: CPT

## 2024-01-20 PROCEDURE — 86698 HISTOPLASMA ANTIBODY: CPT

## 2024-01-20 PROCEDURE — 87385 HISTOPLASMA CAPSUL AG IA: CPT

## 2024-01-20 PROCEDURE — 84443 ASSAY THYROID STIM HORMONE: CPT

## 2024-01-20 PROCEDURE — 82728 ASSAY OF FERRITIN: CPT

## 2024-01-20 PROCEDURE — 83540 ASSAY OF IRON: CPT

## 2024-01-20 PROCEDURE — 86480 TB TEST CELL IMMUN MEASURE: CPT

## 2024-01-20 PROCEDURE — 85027 COMPLETE CBC AUTOMATED: CPT

## 2024-01-20 PROCEDURE — 93010 ELECTROCARDIOGRAM REPORT: CPT

## 2024-01-20 PROCEDURE — 93005 ELECTROCARDIOGRAM TRACING: CPT

## 2024-01-20 PROCEDURE — 87086 URINE CULTURE/COLONY COUNT: CPT

## 2024-01-20 PROCEDURE — 85652 RBC SED RATE AUTOMATED: CPT

## 2024-01-20 PROCEDURE — 82746 ASSAY OF FOLIC ACID SERUM: CPT

## 2024-01-20 PROCEDURE — 0225U NFCT DS DNA&RNA 21 SARSCOV2: CPT

## 2024-01-20 PROCEDURE — 71260 CT THORAX DX C+: CPT | Mod: MC

## 2024-01-20 PROCEDURE — 87077 CULTURE AEROBIC IDENTIFY: CPT

## 2024-01-20 PROCEDURE — 74177 CT ABD & PELVIS W/CONTRAST: CPT

## 2024-01-20 PROCEDURE — 97110 THERAPEUTIC EXERCISES: CPT | Mod: GP

## 2024-01-20 PROCEDURE — 87640 STAPH A DNA AMP PROBE: CPT

## 2024-01-20 PROCEDURE — 84100 ASSAY OF PHOSPHORUS: CPT

## 2024-01-20 PROCEDURE — 71045 X-RAY EXAM CHEST 1 VIEW: CPT | Mod: 26

## 2024-01-20 PROCEDURE — 84439 ASSAY OF FREE THYROXINE: CPT

## 2024-01-20 PROCEDURE — 31720 CLEARANCE OF AIRWAYS: CPT

## 2024-01-20 PROCEDURE — 86901 BLOOD TYPING SEROLOGIC RH(D): CPT

## 2024-01-20 PROCEDURE — 99291 CRITICAL CARE FIRST HOUR: CPT | Mod: GC

## 2024-01-20 PROCEDURE — 94660 CPAP INITIATION&MGMT: CPT

## 2024-01-20 PROCEDURE — 93970 EXTREMITY STUDY: CPT

## 2024-01-20 PROCEDURE — 83735 ASSAY OF MAGNESIUM: CPT

## 2024-01-20 PROCEDURE — 86850 RBC ANTIBODY SCREEN: CPT

## 2024-01-20 PROCEDURE — 84132 ASSAY OF SERUM POTASSIUM: CPT

## 2024-01-20 PROCEDURE — 94640 AIRWAY INHALATION TREATMENT: CPT

## 2024-01-20 PROCEDURE — 92612 ENDOSCOPY SWALLOW (FEES) VID: CPT | Mod: GN

## 2024-01-20 PROCEDURE — 87449 NOS EACH ORGANISM AG IA: CPT

## 2024-01-20 PROCEDURE — 83550 IRON BINDING TEST: CPT

## 2024-01-20 PROCEDURE — 83605 ASSAY OF LACTIC ACID: CPT

## 2024-01-20 PROCEDURE — 36415 COLL VENOUS BLD VENIPUNCTURE: CPT

## 2024-01-20 PROCEDURE — 84484 ASSAY OF TROPONIN QUANT: CPT

## 2024-01-20 PROCEDURE — 92610 EVALUATE SWALLOWING FUNCTION: CPT | Mod: GN

## 2024-01-20 PROCEDURE — 82962 GLUCOSE BLOOD TEST: CPT

## 2024-01-20 PROCEDURE — 87040 BLOOD CULTURE FOR BACTERIA: CPT

## 2024-01-20 PROCEDURE — 85018 HEMOGLOBIN: CPT

## 2024-01-20 PROCEDURE — 80177 DRUG SCRN QUAN LEVETIRACETAM: CPT

## 2024-01-20 PROCEDURE — 92526 ORAL FUNCTION THERAPY: CPT | Mod: GN

## 2024-01-20 PROCEDURE — 87150 DNA/RNA AMPLIFIED PROBE: CPT

## 2024-01-20 PROCEDURE — 83880 ASSAY OF NATRIURETIC PEPTIDE: CPT

## 2024-01-20 PROCEDURE — 93308 TTE F-UP OR LMTD: CPT

## 2024-01-20 PROCEDURE — 99223 1ST HOSP IP/OBS HIGH 75: CPT

## 2024-01-20 PROCEDURE — L8699: CPT

## 2024-01-20 PROCEDURE — 86140 C-REACTIVE PROTEIN: CPT

## 2024-01-20 PROCEDURE — 87641 MR-STAPH DNA AMP PROBE: CPT

## 2024-01-20 RX ORDER — ACETAMINOPHEN 500 MG
975 TABLET ORAL ONCE
Refills: 0 | Status: COMPLETED | OUTPATIENT
Start: 2024-01-20 | End: 2024-01-20

## 2024-01-20 RX ORDER — RALOXIFENE HYDROCHLORIDE 60 MG/1
60 TABLET, COATED ORAL DAILY
Refills: 0 | Status: DISCONTINUED | OUTPATIENT
Start: 2024-01-20 | End: 2024-02-06

## 2024-01-20 RX ORDER — SENNA PLUS 8.6 MG/1
2 TABLET ORAL AT BEDTIME
Refills: 0 | Status: DISCONTINUED | OUTPATIENT
Start: 2024-01-20 | End: 2024-04-08

## 2024-01-20 RX ORDER — PIPERACILLIN AND TAZOBACTAM 4; .5 G/20ML; G/20ML
3.38 INJECTION, POWDER, LYOPHILIZED, FOR SOLUTION INTRAVENOUS EVERY 8 HOURS
Refills: 0 | Status: DISCONTINUED | OUTPATIENT
Start: 2024-01-21 | End: 2024-01-21

## 2024-01-20 RX ORDER — CEFEPIME 1 G/1
INJECTION, POWDER, FOR SOLUTION INTRAMUSCULAR; INTRAVENOUS
Refills: 0 | Status: DISCONTINUED | OUTPATIENT
Start: 2024-01-20 | End: 2024-01-20

## 2024-01-20 RX ORDER — ACETAMINOPHEN 500 MG
650 TABLET ORAL EVERY 6 HOURS
Refills: 0 | Status: DISCONTINUED | OUTPATIENT
Start: 2024-01-20 | End: 2024-02-06

## 2024-01-20 RX ORDER — ENOXAPARIN SODIUM 100 MG/ML
50 INJECTION SUBCUTANEOUS EVERY 12 HOURS
Refills: 0 | Status: DISCONTINUED | OUTPATIENT
Start: 2024-01-20 | End: 2024-01-24

## 2024-01-20 RX ORDER — POLYETHYLENE GLYCOL 3350 17 G/17G
17 POWDER, FOR SOLUTION ORAL AT BEDTIME
Refills: 0 | Status: DISCONTINUED | OUTPATIENT
Start: 2024-01-20 | End: 2024-04-08

## 2024-01-20 RX ORDER — LANOLIN ALCOHOL/MO/W.PET/CERES
3 CREAM (GRAM) TOPICAL AT BEDTIME
Refills: 0 | Status: DISCONTINUED | OUTPATIENT
Start: 2024-01-20 | End: 2024-01-20

## 2024-01-20 RX ORDER — ONDANSETRON 8 MG/1
4 TABLET, FILM COATED ORAL EVERY 8 HOURS
Refills: 0 | Status: DISCONTINUED | OUTPATIENT
Start: 2024-01-20 | End: 2024-04-08

## 2024-01-20 RX ORDER — LEVETIRACETAM 250 MG/1
500 TABLET, FILM COATED ORAL EVERY 12 HOURS
Refills: 0 | Status: DISCONTINUED | OUTPATIENT
Start: 2024-01-20 | End: 2024-01-21

## 2024-01-20 RX ORDER — CEFEPIME 1 G/1
2000 INJECTION, POWDER, FOR SOLUTION INTRAMUSCULAR; INTRAVENOUS EVERY 8 HOURS
Refills: 0 | Status: DISCONTINUED | OUTPATIENT
Start: 2024-01-20 | End: 2024-01-20

## 2024-01-20 RX ORDER — PANTOPRAZOLE SODIUM 20 MG/1
40 TABLET, DELAYED RELEASE ORAL EVERY 24 HOURS
Refills: 0 | Status: DISCONTINUED | OUTPATIENT
Start: 2024-01-20 | End: 2024-04-08

## 2024-01-20 RX ORDER — SODIUM CHLORIDE 9 MG/ML
1000 INJECTION, SOLUTION INTRAVENOUS
Refills: 0 | Status: DISCONTINUED | OUTPATIENT
Start: 2024-01-20 | End: 2024-01-23

## 2024-01-20 RX ORDER — SODIUM CHLORIDE 9 MG/ML
2000 INJECTION, SOLUTION INTRAVENOUS ONCE
Refills: 0 | Status: COMPLETED | OUTPATIENT
Start: 2024-01-20 | End: 2024-01-20

## 2024-01-20 RX ORDER — ACETAMINOPHEN 500 MG
700 TABLET ORAL ONCE
Refills: 0 | Status: COMPLETED | OUTPATIENT
Start: 2024-01-20 | End: 2024-01-20

## 2024-01-20 RX ORDER — PIPERACILLIN AND TAZOBACTAM 4; .5 G/20ML; G/20ML
3.38 INJECTION, POWDER, LYOPHILIZED, FOR SOLUTION INTRAVENOUS ONCE
Refills: 0 | Status: DISCONTINUED | OUTPATIENT
Start: 2024-01-20 | End: 2024-01-21

## 2024-01-20 RX ORDER — LANOLIN ALCOHOL/MO/W.PET/CERES
3 CREAM (GRAM) TOPICAL AT BEDTIME
Refills: 0 | Status: DISCONTINUED | OUTPATIENT
Start: 2024-01-20 | End: 2024-04-08

## 2024-01-20 RX ORDER — CEFEPIME 1 G/1
2000 INJECTION, POWDER, FOR SOLUTION INTRAMUSCULAR; INTRAVENOUS ONCE
Refills: 0 | Status: COMPLETED | OUTPATIENT
Start: 2024-01-20 | End: 2024-01-20

## 2024-01-20 RX ORDER — CALCIUM CARBONATE 500(1250)
1 TABLET ORAL
Refills: 0 | Status: DISCONTINUED | OUTPATIENT
Start: 2024-01-20 | End: 2024-03-21

## 2024-01-20 RX ORDER — VANCOMYCIN HCL 1 G
500 VIAL (EA) INTRAVENOUS EVERY 12 HOURS
Refills: 0 | Status: DISCONTINUED | OUTPATIENT
Start: 2024-01-20 | End: 2024-01-22

## 2024-01-20 RX ORDER — MIDODRINE HYDROCHLORIDE 2.5 MG/1
10 TABLET ORAL EVERY 8 HOURS
Refills: 0 | Status: DISCONTINUED | OUTPATIENT
Start: 2024-01-20 | End: 2024-01-24

## 2024-01-20 RX ORDER — GABAPENTIN 400 MG/1
300 CAPSULE ORAL EVERY 12 HOURS
Refills: 0 | Status: DISCONTINUED | OUTPATIENT
Start: 2024-01-20 | End: 2024-04-08

## 2024-01-20 RX ORDER — PIPERACILLIN AND TAZOBACTAM 4; .5 G/20ML; G/20ML
3.38 INJECTION, POWDER, LYOPHILIZED, FOR SOLUTION INTRAVENOUS ONCE
Refills: 0 | Status: COMPLETED | OUTPATIENT
Start: 2024-01-20 | End: 2024-01-20

## 2024-01-20 RX ADMIN — Medication 280 MILLIGRAM(S): at 22:55

## 2024-01-20 RX ADMIN — SODIUM CHLORIDE 60 MILLILITER(S): 9 INJECTION, SOLUTION INTRAVENOUS at 20:02

## 2024-01-20 RX ADMIN — SODIUM CHLORIDE 2000 MILLILITER(S): 9 INJECTION, SOLUTION INTRAVENOUS at 16:17

## 2024-01-20 RX ADMIN — ENOXAPARIN SODIUM 50 MILLIGRAM(S): 100 INJECTION SUBCUTANEOUS at 22:54

## 2024-01-20 RX ADMIN — Medication 975 MILLIGRAM(S): at 16:17

## 2024-01-20 RX ADMIN — PIPERACILLIN AND TAZOBACTAM 200 GRAM(S): 4; .5 INJECTION, POWDER, LYOPHILIZED, FOR SOLUTION INTRAVENOUS at 20:02

## 2024-01-20 RX ADMIN — CEFEPIME 100 MILLIGRAM(S): 1 INJECTION, POWDER, FOR SOLUTION INTRAMUSCULAR; INTRAVENOUS at 17:15

## 2024-01-20 NOTE — CHART NOTE - NSCHARTNOTEFT_GEN_A_CORE
Informed that patient's temperature is 105.3, ordered IV Tylenol, Cooling blanket and added Vancomycin.   Patient on Zosyn. Blood culture and fungitell pending.  Will follow up

## 2024-01-20 NOTE — H&P ADULT - HISTORY OF PRESENT ILLNESS
58F w/ PMHx Down Syndrome, nonverbal at baseline, Hypothyroidism, Cerebral palsy and Seizure Disorders presents to the ED from nursing facility/group home ([facility]) presented to ED for respiratory distress and high grade fever. Patient found to be septic on admission.    Vitals: Temp 104.5F (rectal), /74, , RR 24, SpO2 100% on BiPAP    Labs: Hgb 9.5 (previously 11.1), Platelet 422, INR 1.43, VBG Lactate 2.1    Imaging: CXR shows possible RML infiltrate    In the ED:  - s/p Cefepime 2g IV x1  - s/p Levaquin 750mg IV x1  - s/p 2L LR bolus    Admitted to SDU for management of acute respiratory distress 2/2 CAP/Aspiration PNA 58F w/ PMHx Down Syndrome, nonverbal at baseline, Hypothyroidism, Cerebral palsy and Seizure Disorders presents to the ED from nursing facility/group home ([facility]) presented to ED for respiratory distress and high grade fever. Patient found to be septic on admission. As per aide Janette at bedside, patient had been coughing and congested for 3x days. Denies fevers, chills, n/v/d or pain prior to admission. Patient is on a puree diet at baseline.    Vitals: Temp 104.5F (rectal), /74, , RR 24, SpO2 100% on BiPAP    Labs: Hgb 9.5 (previously 11.1), Platelet 422, INR 1.43, VBG Lactate 2.1    Imaging: CXR shows possible RML infiltrate    In the ED:  - s/p Cefepime 2g IV x1  - s/p Levaquin 750mg IV x1  - s/p 2L LR bolus    Admitted to SDU for management of acute respiratory distress 2/2 CAP/Aspiration PNA

## 2024-01-20 NOTE — H&P ADULT - NSHPLABSRESULTS_GEN_ALL_CORE
LABS:  cret                        9.5    8.61  )-----------( 422      ( 20 Jan 2024 16:15 )             30.8     01-20    141  |  103  |  15  ----------------------------<  136<H>  4.5   |  29  |  0.9    Ca    7.7<L>      20 Jan 2024 16:15    TPro  6.5  /  Alb  3.2<L>  /  TBili  <0.2  /  DBili  x   /  AST  25  /  ALT  25  /  AlkPhos  103  01-20    PT/INR - ( 20 Jan 2024 16:15 )   PT: 16.40 sec;   INR: 1.43 ratio         PTT - ( 20 Jan 2024 16:15 )  PTT:38.6 sec

## 2024-01-20 NOTE — ED PROVIDER NOTE - CADM POA CENTRAL LINE
2019  EMPLOYEE INFORMATION: EMPLOYER INFORMATION:   NAME: [employer]   : 1974 [phone]   DATE OF INJURY/EVENT: 2019           Location: [facility]   Treating Provider: Don Fisher MD  Time In:  1:24 PM Time Out:  1:49 PM      DIAGNOSIS:   1. Strain of right shoulder, subsequent encounter    2. Right supraspinatus tendinitis    3. Subacromial bursitis of right shoulder joint      STATUS: This injury is determined to be WORK RELATED.    RETURN TO WORK:  Employee may return to work with restrictions.     Return Date: 2019            RESTRICTIONS:   Restrictions are to be followed at work and at home.  Restrictions are in effect until next follow-up visit.  No lifting more than 10 pounds with R hand/arm  No overhead work with R hand/arm    TREATMENT PLAN:  Medications for this injury/condition:   Tylenol  Referral/Consult:  Orthopedic Services:  Start       Diagnostic Testing:   None         Instructions:   Followup with Orthopedics for further eval/treatment    NEXT RETURN VISIT:DISCHARGED   Thank you for the privilege of providing medical care for this injury/condition.  If there are any questions, please call the occupational health clinic at Dept: [phone].      Electronically signed on 2019 at 1:49 PM by:   Don Fisher MD   [facility]   No

## 2024-01-20 NOTE — H&P ADULT - ATTENDING COMMENTS
Patient seen and examined at bedside independently of the residents. I read the resident's note and agree with the plan with the additions and corrections as noted below. My note supersedes the resident's note.     REVIEW OF SYSTEMS:  Unable to obtain due to patient's condition.     PMH: Down syndrome (non verbal at baseline), Cerebral palsy, Hypothyroidism, Seizure    FHx: Reviewed. No fhx of asthma/copd, No fhx of liver and pulmonary disease. No fhx of hematological disorder.     Physical Exam:  GEN: Lethargic but open her eyes intermittently. A & O x 0.   Head: Atraumatic, Normocephalic.   Eye: PEERLA. No sclera icterus.   ENT: Normal oropharynx, no thyromegaly, no mass, no lymphadenopathy.   LUNGS: Clear to auscultation bilaterally. No wheeze/rales/crackles.   HEART: Normal. S1/S2 present. RRR. No murmur/gallops.   ABD: Soft, non-tender, non-distended. Bowel sounds present.   EXT: No pitting edema. B/L LE contracted.  + right foot heel necrotic ulcer.   Integumentary: No rash, No sore, No petechia. + stage 2-3 decubitus ulcer at buttock.   NEURO: B/L ULE contracted.    Vital Signs Last 24 Hrs  T(C): 38.2 (2024 02:42), Max: 40.7 (2024 21:54)  T(F): 100.8 (2024 02:42), Max: 105.3 (2024 21:54)  HR: 104 (2024 03:00) (104 - 147)  BP: 97/40 (2024 03:00) (85/42 - 118/79)  BP(mean): 58 (2024 03:00) (58 - 89)  RR: 16 (2024 03:00) (16 - 38)  SpO2: 98% (2024 03:00) (98% - 100%)    Parameters below as of 2024 03:00  Patient On (Oxygen Delivery Method): BiPAP/CPAP      Please see the above notes for Labs and radiology.     Assessment and Plan:     57 yo F with hx of Down syndrome (non verbal at baseline), Cerebral palsy, Hypothyroidism, Seizure presents to ED from NH for fever and hypoxia.     Septic shock  - Ddx: PNA vs. UTI vs. Right foot necrotic infection  - CXR: right sided patchy opacity to my read --> pending official report.   - s/p Cefepime and Levaquin x 1 given in ED.   - c/w Vanc and meropenem for now (pt has hx of ESBL proteus in right foot wound Cx)  - f/u BCx, atypical PNA panel, MRSA nares, RVP and procalcitonin  - check UA and UCx   - BiPAP prn  - ID consult.   - Burn consult.   - Upgrade to ICU.     AHRF likely 2/2 septic shock 2/2 PNA/Viral infection  - check RVP, BCx, atypical PNA panel and MRSA nares.   - c/w IV Vanc and meropenem   - NPO with BiPAP for now.     LLE DVT - switch from eliquis to Lovenox while on BiPAP.   Down syndrome/Cerebral palsy - non verbal at baseline.   Hypothyroidism - check TSH.   Seizure - on keppra BID. Check REEG.     DVT ppx: Lovenox SC  GI ppx: PPI   Diet: NPO while on BiPAP  Activity: as tolerated.     Date seen by the attendin2024  Total time spent: 75 minutes.

## 2024-01-20 NOTE — H&P ADULT - ASSESSMENT
58F w/ PMHx Down Syndrome, nonverbal at baseline, Hypothyroidism, Cerebral palsy and Seizure Disorders presents to the ED from nursing [facility] presented to ED for respiratory distress and high grade fever. Patient found to be septic on admission. Admitted to SDU for management of acute respiratory distress likely 2/2 CAP or aspiration PNA.    #Sepsis POA  #Acute Respiratory Distress 2/2 Aspiration PNA or CAP  - h/o of aspiration of PNA on previous admissions requiring SDU/ICU  - Vitals on admission: Temp 104.5F (rectal), /74, , RR 24, SpO2 100% on BiPAP  - WBC wnl w/ left shift, thrombocytosis (plt 422)  - VBG Lactate 2.1, pH wnl and CO2 mildly elevated 61  - previously fungitell +ve s/p Caspofungin on previous admission  - CXR shows possible RML infiltrate  - s/p Cefepime 2g IV x1 and Levaquin 750mg IV x1  - s/p 2L LR bolus  - c/w BiPAP  - f/u AM ABG  - f/u procal  - f/u BCx, UCx  - f/u full RVP, MRSA swab, urine strep, urine legionella, fungitell  - f/u d-dimer and LE duplex  - f/u ID consult  - f/u Speech and Swallow  - monitor fever curve  - aspiration precautions    #Anemia  Labs: Hgb 9.5 (previously 11.1)  INR 1.43    #h/o DVT of L Common Femoral Vein  - nonocclusive of L common femoral vein  - on enoxaparin 55 mg subcut q12 hours,  switched to Eliquis 5 mg PO BID on 11/16     #Seizure Disorder  - on keprra 500 mg BID     #Chronic barlow for urinary retention  -     #Right foot (1st toe stump and 2nd distal phalanx) OM and multiple L foot deep tissue injuries (seen on xray and MRI)  - Patient underwent excisional debridement of ulcer of right foot (including 1st metatarsal) and partial amputation 2nd toe   - Cx grew Proteus ESBL and finished Meropenem course  - Wound care following  - No further debridement as per podiatry, fu as op    #DVT ppx: on Eliquis 5mg PO BID   #GI ppx:  #Diet: NPO for now - on BiPAP - f/u S&S c/s  #Activity: IAT - PT consult  #Code Status: Full Code  #Dispo: from Group Home ([facility]), SDU for now, acute 58F w/ PMHx Down Syndrome, nonverbal at baseline, Hypothyroidism, Cerebral palsy and Seizure Disorders presents to the ED from nursing facility/group home ([facility]) presented to ED for respiratory distress and high grade fever. Patient found to be septic on admission. Admitted to SDU for management of acute respiratory distress likely 2/2 CAP or aspiration PNA.    #Sepsis POA  #Acute Respiratory Distress 2/2 Aspiration PNA or CAP  - h/o of aspiration of PNA on previous admissions requiring SDU/ICU  - Vitals on admission: Temp 104.5F (rectal), /74, , RR 24, SpO2 100% on BiPAP  - WBC wnl w/ left shift, thrombocytosis (plt 422)  - VBG Lactate 2.1, pH wnl and CO2 mildly elevated 61  - previously fungitell +ve s/p Caspofungin on previous admission  - CXR shows possible RML infiltrate  - s/p Cefepime 2g IV x1 and Levaquin 750mg IV x1  - s/p 2L LR bolus  - c/w BiPAP  - f/u AM ABG  - f/u procal  - f/u BCx, UCx  - f/u full RVP, MRSA swab, urine strep, urine legionella, fungitell  - f/u d-dimer and LE duplex  - f/u ID consult  - f/u Speech and Swallow  - monitor fever curve  - aspiration precautions    #Acute on Chronic Normocytic Anemia  - Hgb 9.5 (previously 11.1)  - INR 1.43  - no evidence of hemorrhage  - f/u b12, folate, iron studies  - monitor H&H  - transfuse PRN, keep hgb >7    #h/o DVT of L Common Femoral Vein  - nonocclusive of L common femoral vein  - on enoxaparin 55 mg subcut q12 hours,  switched to Eliquis 5 mg PO BID on 11/16     #Seizure Disorder  - on keprra 500 mg BID     #Chronic barlow for urinary retention  -     #Right foot (1st toe stump and 2nd distal phalanx) OM and multiple L foot deep tissue injuries (seen on xray and MRI)  - Patient underwent excisional debridement of ulcer of right foot (including 1st metatarsal) and partial amputation 2nd toe   - Cx grew Proteus ESBL and finished Meropenem course  - Wound care following  - No further debridement as per podiatry, fu as op    #DVT ppx: on Eliquis 5mg PO BID   #GI ppx:  #Diet: NPO for now - on BiPAP - f/u S&S c/s  #Activity: IAT - PT consult  #Code Status: Full Code  #Dispo: from Group Home ([facility]), SDU for now, acute 58F w/ PMHx Down Syndrome, nonverbal at baseline, Hypothyroidism, Cerebral palsy and Seizure Disorders presents to the ED from nursing facility/group home ([facility]) presented to ED for respiratory distress and high grade fever. Patient found to be septic on admission. Admitted to SDU for management of acute respiratory distress likely 2/2 CAP or aspiration PNA.    #Sepsis POA  #Acute Respiratory Distress 2/2 Aspiration PNA or CAP  #h/o Dysphagia - Puree Diet at baseline  - h/o of aspiration of PNA on previous admissions requiring SDU/ICU  - Vitals on admission: Temp 104.5F (rectal), /74, , RR 24, SpO2 100% on BiPAP  - WBC wnl w/ left shift, thrombocytosis (plt 422)  - VBG Lactate 2.1, pH wnl and CO2 mildly elevated 61  - previously fungitell +ve s/p Caspofungin on previous admission  - CXR shows possible RML infiltrate  - s/p Cefepime 2g IV x1 and Levaquin 750mg IV x1  - s/p 2L LR bolus  - avoid cefepime given seizure history  - start Zosyn 3.375mg q8hrs - f/u ID  - c/w BiPAP  - f/u AM ABG  - f/u procal  - f/u BCx, UCx  - f/u full RVP, MRSA swab, urine strep, urine legionella, fungitell  - f/u d-dimer and LE duplex  - f/u ID consult  - f/u Speech and Swallow  - monitor fever curve  - aspiration precautions    #Acute on Chronic Normocytic Anemia  - Hgb 9.5 (previously 11.1)  - INR 1.43  - no evidence of hemorrhage  - f/u b12, folate, iron studies  - monitor H&H  - transfuse PRN, keep hgb >7    #h/o DVT of L Common Femoral Vein  - on Eliquis 5mg BID at home  - will switch to Lovenox for now while patient is on BiPAP - resume Eliquis if patient can tolerate PO  - f/u LE duplex    #Seizure Disorder  - on PO Keprra 500mg BID  - will switch to IV Keppra for now while patient is on BiPAP - resume PO Keppra when patient can tolerate PO  - f/u Keppra level    #Hypothyroidism  - not on any medications at home  - f/u TSH    #h/o Right Foot OM (1st toe stump and 2nd distal phalanx)  #h/o Multiple Left Foot deep tissue injuries  #h/o Sacral Wound  - Patient underwent excisional debridement of ulcer of right foot (including 1st metatarsal) and partial amputation 2nd toe on previous admissions  - past wound cultures grew Proteus and ESBL E coli  - reposition q2hrs to prevent pressure injury  - local wound care    #Down Syndrome  #Cerebral Palsy  #Non-Verbal    #DVT ppx: on Eliquis 5mg PO BID - Lovenox Full AC for now - Resume Eliquis when no longer on BiPAP  #GI ppx: Protonix 40mg IV daily - switch to PO when patient can tolerate PO  #Diet: NPO for now, Puree diet at baseline - on BiPAP - f/u S&S c/s  #Activity: IAT - PT consult  #Code Status: Full Code  #Dispo: from Group Home ([facility]), SDU for now, acute 58F w/ PMHx Down Syndrome, nonverbal at baseline, Hypothyroidism, Cerebral palsy and Seizure Disorders presents to the ED from nursing facility/group home ([facility]) presented to ED for respiratory distress and high grade fever. Patient found to be septic on admission. Admitted to SDU for management of acute respiratory distress likely 2/2 CAP or aspiration PNA.    #Sepsis POA  #Acute Respiratory Distress 2/2 Aspiration PNA or CAP  #h/o Dysphagia - Puree Diet at baseline  - h/o of aspiration of PNA on previous admissions requiring SDU/ICU  - Vitals on admission: Temp 104.5F (rectal), /74, , RR 24, SpO2 100% on BiPAP  - WBC wnl w/ left shift, thrombocytosis (plt 422)  - VBG Lactate 2.1, pH wnl and CO2 mildly elevated 61  - previously fungitell +ve s/p Caspofungin on previous admission  - CXR shows possible RML infiltrate  - s/p Cefepime 2g IV x1 and Levaquin 750mg IV x1  - s/p 2L LR bolus  - avoid cefepime given seizure history  - start Zosyn 3.375mg q8hrs - f/u ID  - c/w BiPAP  - f/u AM ABG  - f/u procal  - f/u BCx, UCx  - f/u full RVP, MRSA swab, urine strep, urine legionella, fungitell  - f/u d-dimer and LE duplex  - f/u ID consult  - f/u Speech and Swallow  - monitor fever curve  - aspiration precautions    #Acute on Chronic Normocytic Anemia  - Hgb 9.5 (previously 11.1)  - INR 1.43  - no evidence of hemorrhage  - f/u b12, folate, iron studies  - monitor H&H  - transfuse PRN, keep hgb >7    #h/o DVT of L Common Femoral Vein  - on Eliquis 5mg BID at home  - will switch to Lovenox for now while patient is on BiPAP - resume Eliquis if patient can tolerate PO  - started patient on Lovenox 60mg BID for Full AC based on weight of previous admission - change to new weight when available  - f/u LE duplex    #Seizure Disorder  - on PO Keprra 500mg BID  - will switch to IV Keppra for now while patient is on BiPAP - resume PO Keppra when patient can tolerate PO  - f/u Keppra level    #Hypothyroidism  - not on any medications at home  - f/u TSH    #h/o Right Foot OM (1st toe stump and 2nd distal phalanx)  #h/o Multiple Left Foot deep tissue injuries  #h/o Sacral Wound  - Patient underwent excisional debridement of ulcer of right foot (including 1st metatarsal) and partial amputation 2nd toe on previous admissions  - past wound cultures grew Proteus and ESBL E coli  - reposition q2hrs to prevent pressure injury  - local wound care    #Down Syndrome  #Cerebral Palsy  #Non-Verbal    #DVT ppx: on Eliquis 5mg PO BID - Lovenox Full AC for now - Resume Eliquis when no longer on BiPAP  #GI ppx: Protonix 40mg IV daily - switch to PO when patient can tolerate PO  #Diet: NPO for now, Puree diet at baseline - on BiPAP - f/u S&S c/s  #Activity: IAT - PT consult  #Code Status: Full Code  #Dispo: from Group Home ([facility]), SDU for now, acute 58F w/ PMHx Down Syndrome, nonverbal at baseline, Hypothyroidism, Cerebral palsy and Seizure Disorders presents to the ED from nursing facility/group home ([facility]) presented to ED for respiratory distress and high grade fever. Patient found to be septic on admission. Admitted to SDU for management of acute respiratory distress likely 2/2 CAP or aspiration PNA.    #Sepsis POA  #Acute Respiratory Distress 2/2 Aspiration PNA or CAP  #h/o Dysphagia - Puree Diet at baseline  - h/o of aspiration of PNA on previous admissions requiring SDU/ICU  - Vitals on admission: Temp 104.5F (rectal), /74, , RR 24, SpO2 100% on BiPAP  - WBC wnl w/ left shift, thrombocytosis (plt 422)  - VBG Lactate 2.1, pH wnl and CO2 mildly elevated 61  - previously fungitell +ve s/p Caspofungin on previous admission  - CXR shows possible RML infiltrate  - s/p Cefepime 2g IV x1 and Levaquin 750mg IV x1  - s/p 2L LR bolus  - avoid cefepime given seizure history  - start Zosyn 3.375mg q8hrs - f/u ID  - c/w BiPAP  - f/u AM ABG  - f/u procal  - f/u BCx, UCx  - f/u full RVP, MRSA swab, urine strep, urine legionella, fungitell  - f/u d-dimer and LE duplex  - f/u ID consult  - f/u Speech and Swallow  - monitor fever curve  - aspiration precautions    #Elevated Troponin  - Trop at 25  - f/u repeat  - EKG shows tachycardia, p waves present, likely sinus tach w/ a lot of artifact from shaking    #Acute on Chronic Normocytic Anemia  - Hgb 9.5 (previously 11.1)  - INR 1.43  - no evidence of hemorrhage  - f/u b12, folate, iron studies  - monitor H&H  - transfuse PRN, keep hgb >7    #h/o DVT of L Common Femoral Vein  - on Eliquis 5mg BID at home  - will switch to Lovenox for now while patient is on BiPAP - resume Eliquis if patient can tolerate PO  - started patient on Lovenox 60mg BID for Full AC based on weight of previous admission - change to new weight when available  - f/u LE duplex    #Seizure Disorder  - on PO Keprra 500mg BID  - will switch to IV Keppra for now while patient is on BiPAP - resume PO Keppra when patient can tolerate PO  - f/u Keppra level    #Hypothyroidism  - not on any medications at home  - f/u TSH    #h/o Right Foot OM (1st toe stump and 2nd distal phalanx)  #h/o Multiple Left Foot deep tissue injuries  #h/o Sacral Wound  - Patient underwent excisional debridement of ulcer of right foot (including 1st metatarsal) and partial amputation 2nd toe on previous admissions  - past wound cultures grew Proteus and ESBL E coli  - reposition q2hrs to prevent pressure injury  - local wound care    #Down Syndrome  #Cerebral Palsy  #Non-Verbal    #DVT ppx: on Eliquis 5mg PO BID - Lovenox Full AC for now - Resume Eliquis when no longer on BiPAP  #GI ppx: Protonix 40mg IV daily - switch to PO when patient can tolerate PO  #Diet: NPO for now, Puree diet at baseline - on BiPAP - f/u S&S c/s  #Activity: IAT - PT consult  #Code Status: Full Code  #Dispo: from Group Home ([facility]), SDU for now, acute 58F w/ PMHx Down Syndrome, nonverbal at baseline, Hypothyroidism, Cerebral palsy and Seizure Disorders presents to the ED from nursing facility/group home ([facility]) presented to ED for respiratory distress and high grade fever. Patient found to be septic on admission. Admitted to SDU for management of acute respiratory distress likely 2/2 CAP or aspiration PNA.    #Sepsis POA  #Acute Respiratory Distress 2/2 Aspiration PNA or CAP  #h/o Dysphagia - Puree Diet at baseline  - h/o of aspiration of PNA on previous admissions requiring SDU/ICU  - Vitals on admission: Temp 104.5F (rectal), /74, , RR 24, SpO2 100% on BiPAP  - WBC wnl w/ left shift, thrombocytosis (plt 422)  - VBG Lactate 2.1, pH wnl and CO2 mildly elevated 61  - previously fungitell +ve s/p Caspofungin on previous admission  - CXR shows possible RML infiltrate  - s/p Cefepime 2g IV x1 and Levaquin 750mg IV x1  - s/p 2L LR bolus  - avoid cefepime given seizure history  - start Zosyn 3.375mg q8hrs - f/u ID  - c/w BiPAP  - f/u AM ABG  - f/u Lactate  - f/u procal  - f/u BCx, UCx  - f/u full RVP, MRSA swab, urine strep, urine legionella, fungitell  - f/u d-dimer and LE duplex  - f/u ID consult  - f/u Speech and Swallow  - monitor fever curve  - aspiration precautions    #Elevated Troponin  - Trop at 25  - f/u repeat  - EKG shows tachycardia, p waves present, likely sinus tach w/ a lot of artifact from shaking    #Acute on Chronic Normocytic Anemia  - Hgb 9.5 (previously 11.1)  - INR 1.43  - no evidence of hemorrhage  - f/u b12, folate, iron studies  - monitor H&H  - transfuse PRN, keep hgb >7    #h/o DVT of L Common Femoral Vein  - on Eliquis 5mg BID at home  - will switch to Lovenox for now while patient is on BiPAP - resume Eliquis if patient can tolerate PO  - started patient on Lovenox 60mg BID for Full AC based on weight of previous admission - change to new weight when available  - f/u LE duplex    #Seizure Disorder  - on PO Keprra 500mg BID  - will switch to IV Keppra for now while patient is on BiPAP - resume PO Keppra when patient can tolerate PO  - f/u Keppra level    #Hypothyroidism  - not on any medications at home  - f/u TSH    #h/o Right Foot OM (1st toe stump and 2nd distal phalanx)  #h/o Multiple Left Foot deep tissue injuries  #h/o Sacral Wound  - Patient underwent excisional debridement of ulcer of right foot (including 1st metatarsal) and partial amputation 2nd toe on previous admissions  - past wound cultures grew Proteus and ESBL E coli  - reposition q2hrs to prevent pressure injury  - local wound care    #Down Syndrome  #Cerebral Palsy  #Non-Verbal    #DVT ppx: on Eliquis 5mg PO BID - Lovenox Full AC for now - Resume Eliquis when no longer on BiPAP  #GI ppx: Protonix 40mg IV daily - switch to PO when patient can tolerate PO  #Diet: NPO for now, Puree diet at baseline - on BiPAP - f/u S&S c/s  #Activity: IAT - PT consult  #Code Status: Full Code  #Dispo: from Group Home ([facility]), SDU for now, acute

## 2024-01-20 NOTE — ED PROVIDER NOTE - PHYSICAL EXAMINATION
CONSTITUTIONAL: tachypneic, tachycardic,   SKIN: Warm, dry  HEAD: NCAT  EYES: Clear conjunctiva   ENT: MM dry  NECK: Supple  CARD: tachycardic, RR, S1, S2; no M/R/G  RESP: coarse breath sounds bilaterally  ABD: Soft, nontender. No rebound, rigidity, or guarding  EXT: no lower extremity pitting edema  NEURO: Awake, moving all extremities, appearing sleepy but responding to verbal stimuli

## 2024-01-20 NOTE — ED PROVIDER NOTE - CLINICAL SUMMARY MEDICAL DECISION MAKING FREE TEXT BOX
Patient presented with worsening dyspnea and hypoxia as well as fever from group home as documented.  On arrival to ED, patient febrile with evidence of sepsis and in acute respiratory distress requiring my immediate attention.  Seen on arrival at which point rhonchi noted bilaterally.  Given high concern for possible pneumonia and impending respiratory failure, patient placed on BiPAP with significant improvement in respiratory status.  Patient was also given broad-spectrum antibiotics as well as IV fluid bolus.  EKG obtained and showed sinus tachycardia, but no evidence of STEMI.  Obtained labs which were grossly unremarkable including no significant leukocytosis, anemia, signs of dehydration/NELA, transaminitis or significant electrolyte abnormalities.  Chest x-ray showed bilateral opacities, without definitive focal consolidation to suggest pneumonia, although patient appears to be high aspiration risk and therefore antibiotics still warranted.  Patient remained stable during ED course on BiPAP.  Consulted ICU who agreed with plan to admit to stepdown at this time for further management.  Hemodynamically stable at time of admission.

## 2024-01-20 NOTE — ED PROVIDER NOTE - DIFFERENTIAL DIAGNOSIS
Dyspnea, hypoxia, likely sepsis but will consider ACS vs PE vs dissection vs tamponade vs esophageal rupture vs pneumothorax vs pneumonia vs fluid overload Differential Diagnosis

## 2024-01-20 NOTE — ED PROVIDER NOTE - CONSIDERATION OF ADMISSION OBSERVATION
Consideration of Admission/Observation Patient with acute respiratory failure 2/2 septic pneumonia - will require admission

## 2024-01-20 NOTE — ED PROVIDER NOTE - OBJECTIVE STATEMENT
58yoF PMHx Down Syndrome, nonverbal at baseline, Hypothyroidism, Cerebral palsy and Seizure Disorders presents to the ED from nursing facility/group home ([facility]) for increased work of breathing, hypoxia and fever. Pt arrived with EMS satting 80s on NRB, audible rales. Temp found to be 104 rectally, code sepsis activated.

## 2024-01-20 NOTE — ED ADULT NURSE REASSESSMENT NOTE - NS ED NURSE REASSESS COMMENT FT1
received handoff from RN. Pt nonverbal, alert to tactile stimulation. IVs intact, RR even and unlabored. No distress noted this time.

## 2024-01-20 NOTE — H&P ADULT - NSHPPHYSICALEXAM_GEN_ALL_CORE
VITALS:   Vital Signs Last 24 Hrs  T(C): 38.9 (20 Jan 2024 17:32), Max: 40.3 (20 Jan 2024 16:09)  T(F): 102 (20 Jan 2024 17:32), Max: 104.5 (20 Jan 2024 16:09)  HR: 131 (20 Jan 2024 17:16) (110 - 131)  BP: 116/74 (20 Jan 2024 17:16) (116/74 - 116/74)  BP(mean): 89 (20 Jan 2024 17:16) (89 - 89)  RR: 24 (20 Jan 2024 17:16) (24 - 24)  SpO2: 100% (20 Jan 2024 17:16) (98% - 100%)    Parameters below as of 20 Jan 2024 17:16  Patient On (Oxygen Delivery Method): BiPAP/CPAP    O2 Concentration (%): 100      PHYSICAL EXAM:  General: WN/WD NAD  HEENT: PERRLA, EOMI, moist mucous membranes  Neurology: A&Ox3, nonfocal, COHEN x 4  Respiratory: CTA B/L, normal respiratory effort, no wheezes, crackles, rales  CV: RRR, S1S2, no murmurs, rubs or gallops  Abdominal: Soft, NT, ND +BS, Last BM  Extremities: No edema, + peripheral pulses VITALS:   Vital Signs Last 24 Hrs  T(C): 38.9 (20 Jan 2024 17:32), Max: 40.3 (20 Jan 2024 16:09)  T(F): 102 (20 Jan 2024 17:32), Max: 104.5 (20 Jan 2024 16:09)  HR: 131 (20 Jan 2024 17:16) (110 - 131)  BP: 116/74 (20 Jan 2024 17:16) (116/74 - 116/74)  BP(mean): 89 (20 Jan 2024 17:16) (89 - 89)  RR: 24 (20 Jan 2024 17:16) (24 - 24)  SpO2: 100% (20 Jan 2024 17:16) (98% - 100%)    Parameters below as of 20 Jan 2024 17:16  Patient On (Oxygen Delivery Method): BiPAP/CPAP    O2 Concentration (%): 100      PHYSICAL EXAM:  General: ill appearing  HEENT: dry mucous membranes  Neurology: A&Ox0  Respiratory: crackles, rhonchi b/l, congested b/l, mild wheeze  CV: tachycardic, S1S2, no murmurs, rubs or gallops  Abdominal: Soft, NT, ND, +BS, no rebound or guarding  Extremities: No pitting edema in b/l LE, +2 peripheral pulses in all 4 extremities

## 2024-01-21 LAB
-  COAGULASE NEGATIVE STAPHYLOCOCCUS: SIGNIFICANT CHANGE UP
ALBUMIN SERPL ELPH-MCNC: 2.9 G/DL — LOW (ref 3.5–5.2)
ALP SERPL-CCNC: 96 U/L — SIGNIFICANT CHANGE UP (ref 30–115)
ALT FLD-CCNC: 24 U/L — SIGNIFICANT CHANGE UP (ref 0–41)
ANION GAP SERPL CALC-SCNC: 17 MMOL/L — HIGH (ref 7–14)
APPEARANCE UR: CLEAR — SIGNIFICANT CHANGE UP
AST SERPL-CCNC: 26 U/L — SIGNIFICANT CHANGE UP (ref 0–41)
BILIRUB SERPL-MCNC: 0.3 MG/DL — SIGNIFICANT CHANGE UP (ref 0.2–1.2)
BILIRUB UR-MCNC: NEGATIVE — SIGNIFICANT CHANGE UP
BUN SERPL-MCNC: 12 MG/DL — SIGNIFICANT CHANGE UP (ref 10–20)
CALCIUM SERPL-MCNC: 7.7 MG/DL — LOW (ref 8.4–10.5)
CHLORIDE SERPL-SCNC: 104 MMOL/L — SIGNIFICANT CHANGE UP (ref 98–110)
CO2 SERPL-SCNC: 23 MMOL/L — SIGNIFICANT CHANGE UP (ref 17–32)
COLOR SPEC: YELLOW — SIGNIFICANT CHANGE UP
CREAT SERPL-MCNC: 0.9 MG/DL — SIGNIFICANT CHANGE UP (ref 0.7–1.5)
DIFF PNL FLD: NEGATIVE — SIGNIFICANT CHANGE UP
EGFR: 75 ML/MIN/1.73M2 — SIGNIFICANT CHANGE UP
GAS PNL BLDA: SIGNIFICANT CHANGE UP
GLUCOSE SERPL-MCNC: 217 MG/DL — HIGH (ref 70–99)
GLUCOSE UR QL: NEGATIVE MG/DL — SIGNIFICANT CHANGE UP
GRAM STN FLD: ABNORMAL
HCT VFR BLD CALC: 30.2 % — LOW (ref 37–47)
HGB BLD-MCNC: 9.5 G/DL — LOW (ref 12–16)
IRON SATN MFR SERPL: 15 UG/DL — LOW (ref 35–150)
IRON SATN MFR SERPL: 6 % — LOW (ref 15–50)
KETONES UR-MCNC: ABNORMAL MG/DL
LACTATE SERPL-SCNC: 1.2 MMOL/L — SIGNIFICANT CHANGE UP (ref 0.7–2)
LEUKOCYTE ESTERASE UR-ACNC: NEGATIVE — SIGNIFICANT CHANGE UP
MAGNESIUM SERPL-MCNC: 2 MG/DL — SIGNIFICANT CHANGE UP (ref 1.8–2.4)
MCHC RBC-ENTMCNC: 25.6 PG — LOW (ref 27–31)
MCHC RBC-ENTMCNC: 31.5 G/DL — LOW (ref 32–37)
MCV RBC AUTO: 81.4 FL — SIGNIFICANT CHANGE UP (ref 81–99)
METHOD TYPE: SIGNIFICANT CHANGE UP
NITRITE UR-MCNC: NEGATIVE — SIGNIFICANT CHANGE UP
NRBC # BLD: 0 /100 WBCS — SIGNIFICANT CHANGE UP (ref 0–0)
PH UR: 6 — SIGNIFICANT CHANGE UP (ref 5–8)
PLATELET # BLD AUTO: 374 K/UL — SIGNIFICANT CHANGE UP (ref 130–400)
PMV BLD: 9.9 FL — SIGNIFICANT CHANGE UP (ref 7.4–10.4)
POTASSIUM SERPL-MCNC: 3.7 MMOL/L — SIGNIFICANT CHANGE UP (ref 3.5–5)
POTASSIUM SERPL-SCNC: 3.7 MMOL/L — SIGNIFICANT CHANGE UP (ref 3.5–5)
PROT SERPL-MCNC: 6.3 G/DL — SIGNIFICANT CHANGE UP (ref 6–8)
PROT UR-MCNC: NEGATIVE MG/DL — SIGNIFICANT CHANGE UP
RAPID RVP RESULT: DETECTED
RBC # BLD: 3.71 M/UL — LOW (ref 4.2–5.4)
RBC # FLD: 18.7 % — HIGH (ref 11.5–14.5)
RSV RNA SPEC QL NAA+PROBE: DETECTED
SARS-COV-2 RNA SPEC QL NAA+PROBE: SIGNIFICANT CHANGE UP
SODIUM SERPL-SCNC: 144 MMOL/L — SIGNIFICANT CHANGE UP (ref 135–146)
SP GR SPEC: 1.01 — SIGNIFICANT CHANGE UP (ref 1–1.03)
SPECIMEN SOURCE: SIGNIFICANT CHANGE UP
TIBC SERPL-MCNC: 259 UG/DL — SIGNIFICANT CHANGE UP (ref 220–430)
TRANSFERRIN SERPL-MCNC: 211 MG/DL — SIGNIFICANT CHANGE UP (ref 200–360)
TROPONIN T, HIGH SENSITIVITY RESULT: 35 NG/L — HIGH (ref 6–13)
TROPONIN T, HIGH SENSITIVITY RESULT: 35 NG/L — HIGH (ref 6–13)
UIBC SERPL-MCNC: 244 UG/DL — SIGNIFICANT CHANGE UP (ref 110–370)
UROBILINOGEN FLD QL: 0.2 MG/DL — SIGNIFICANT CHANGE UP (ref 0.2–1)
WBC # BLD: 16.32 K/UL — HIGH (ref 4.8–10.8)
WBC # FLD AUTO: 16.32 K/UL — HIGH (ref 4.8–10.8)

## 2024-01-21 PROCEDURE — 99291 CRITICAL CARE FIRST HOUR: CPT

## 2024-01-21 PROCEDURE — 95816 EEG AWAKE AND DROWSY: CPT | Mod: 26,1L

## 2024-01-21 PROCEDURE — 71045 X-RAY EXAM CHEST 1 VIEW: CPT | Mod: 26

## 2024-01-21 PROCEDURE — 99221 1ST HOSP IP/OBS SF/LOW 40: CPT | Mod: GC

## 2024-01-21 PROCEDURE — 99222 1ST HOSP IP/OBS MODERATE 55: CPT | Mod: 24

## 2024-01-21 RX ORDER — NOREPINEPHRINE BITARTRATE/D5W 8 MG/250ML
0.05 PLASTIC BAG, INJECTION (ML) INTRAVENOUS
Qty: 8 | Refills: 0 | Status: DISCONTINUED | OUTPATIENT
Start: 2024-01-21 | End: 2024-01-21

## 2024-01-21 RX ORDER — MEROPENEM 1 G/30ML
1000 INJECTION INTRAVENOUS EVERY 8 HOURS
Refills: 0 | Status: DISCONTINUED | OUTPATIENT
Start: 2024-01-21 | End: 2024-01-27

## 2024-01-21 RX ORDER — NOREPINEPHRINE BITARTRATE/D5W 8 MG/250ML
0.05 PLASTIC BAG, INJECTION (ML) INTRAVENOUS
Qty: 16 | Refills: 0 | Status: DISCONTINUED | OUTPATIENT
Start: 2024-01-21 | End: 2024-01-24

## 2024-01-21 RX ORDER — LEVETIRACETAM 250 MG/1
500 TABLET, FILM COATED ORAL EVERY 12 HOURS
Refills: 0 | Status: DISCONTINUED | OUTPATIENT
Start: 2024-01-21 | End: 2024-01-21

## 2024-01-21 RX ORDER — ACETAMINOPHEN 500 MG
1000 TABLET ORAL ONCE
Refills: 0 | Status: COMPLETED | OUTPATIENT
Start: 2024-01-21 | End: 2024-01-21

## 2024-01-21 RX ORDER — LEVETIRACETAM 250 MG/1
500 TABLET, FILM COATED ORAL EVERY 12 HOURS
Refills: 0 | Status: DISCONTINUED | OUTPATIENT
Start: 2024-01-21 | End: 2024-01-22

## 2024-01-21 RX ORDER — SODIUM CHLORIDE 9 MG/ML
500 INJECTION, SOLUTION INTRAVENOUS ONCE
Refills: 0 | Status: COMPLETED | OUTPATIENT
Start: 2024-01-21 | End: 2024-01-21

## 2024-01-21 RX ORDER — NOREPINEPHRINE BITARTRATE/D5W 8 MG/250ML
0.05 PLASTIC BAG, INJECTION (ML) INTRAVENOUS
Qty: 32 | Refills: 0 | Status: DISCONTINUED | OUTPATIENT
Start: 2024-01-21 | End: 2024-01-21

## 2024-01-21 RX ADMIN — Medication 700 MILLIGRAM(S): at 00:30

## 2024-01-21 RX ADMIN — SODIUM CHLORIDE 60 MILLILITER(S): 9 INJECTION, SOLUTION INTRAVENOUS at 01:38

## 2024-01-21 RX ADMIN — Medication 400 MILLIGRAM(S): at 16:43

## 2024-01-21 RX ADMIN — Medication 1000 MILLIGRAM(S): at 17:13

## 2024-01-21 RX ADMIN — Medication 1 APPLICATION(S): at 21:33

## 2024-01-21 RX ADMIN — Medication 1 DROP(S): at 05:49

## 2024-01-21 RX ADMIN — LEVETIRACETAM 400 MILLIGRAM(S): 250 TABLET, FILM COATED ORAL at 02:17

## 2024-01-21 RX ADMIN — Medication 4.35 MICROGRAM(S)/KG/MIN: at 01:38

## 2024-01-21 RX ADMIN — Medication 1 DROP(S): at 18:00

## 2024-01-21 RX ADMIN — Medication 100 MILLIGRAM(S): at 14:43

## 2024-01-21 RX ADMIN — PANTOPRAZOLE SODIUM 40 MILLIGRAM(S): 20 TABLET, DELAYED RELEASE ORAL at 05:49

## 2024-01-21 RX ADMIN — LEVETIRACETAM 400 MILLIGRAM(S): 250 TABLET, FILM COATED ORAL at 17:15

## 2024-01-21 RX ADMIN — MEROPENEM 100 MILLIGRAM(S): 1 INJECTION INTRAVENOUS at 11:37

## 2024-01-21 RX ADMIN — ENOXAPARIN SODIUM 50 MILLIGRAM(S): 100 INJECTION SUBCUTANEOUS at 11:41

## 2024-01-21 RX ADMIN — MEROPENEM 100 MILLIGRAM(S): 1 INJECTION INTRAVENOUS at 13:27

## 2024-01-21 RX ADMIN — ENOXAPARIN SODIUM 50 MILLIGRAM(S): 100 INJECTION SUBCUTANEOUS at 22:08

## 2024-01-21 RX ADMIN — MEROPENEM 100 MILLIGRAM(S): 1 INJECTION INTRAVENOUS at 21:32

## 2024-01-21 RX ADMIN — MEROPENEM 100 MILLIGRAM(S): 1 INJECTION INTRAVENOUS at 02:34

## 2024-01-21 RX ADMIN — SODIUM CHLORIDE 1000 MILLILITER(S): 9 INJECTION, SOLUTION INTRAVENOUS at 03:08

## 2024-01-21 RX ADMIN — Medication 100 MILLIGRAM(S): at 01:38

## 2024-01-21 NOTE — CONSULT NOTE ADULT - ASSESSMENT
Assesment/Plan  Patient is a 58y old  Female who presents with necrotic wound to R foot located at heel;  (21 Jan 2024 06:31)      no surgical intervention at this time  continue local wound care BID, wash with soap and water, apply silvadene, adaptic, kerlix  Recommend; Podiatry consult for Right foot necrotic wound  rest of care per primary team

## 2024-01-21 NOTE — ED ADULT NURSE NOTE - SUICIDE SCREENING QUESTION 3
"See dermtologist about ear dermatitis        Creams:    For ear: TAC cream in tube 2 or 3 times a day for one week                Then change to other cream    Every day, Everywhere except ears:  Aquaphor    For elbows and knees when they flare:  hydrocortisones 2.5% twice a day               Until skin is back to its usual.        Liquids    Zyrtec 2 teaspoons daily for itching       Can take every day of just "when needed"       (9% chance of causing drowsiness.)  "
Patient unable to complete

## 2024-01-21 NOTE — CONSULT NOTE ADULT - SUBJECTIVE AND OBJECTIVE BOX
TEA ECHAVARRIA  58y, Female  Allergies    No Known Allergies    Intolerances    LOS  1d    HPI  HPI:  58F w/ PMHx Down Syndrome, nonverbal at baseline, Hypothyroidism, Cerebral palsy and Seizure Disorders presents to the ED from nursing facility/group home ([facility]) presented to ED for respiratory distress and high grade fever. Patient found to be septic on admission. As per aide Janette at bedside, patient had been coughing and congested for 3x days. Denies fevers, chills, n/v/d or pain prior to admission. Patient is on a puree diet at baseline.    Vitals: Temp 104.5F (rectal), /74, , RR 24, SpO2 100% on BiPAP    Labs: Hgb 9.5 (previously 11.1), Platelet 422, INR 1.43, VBG Lactate 2.1    Imaging: CXR shows possible RML infiltrate    In the ED:  - s/p Cefepime 2g IV x1  - s/p Levaquin 750mg IV x1  - s/p 2L LR bolus    Admitted to SDU for management of acute respiratory distress 2/2 CAP/Aspiration PNA (20 Jan 2024 18:22)      INFECTIOUS DISEASE HISTORY:  Hospital course-  ID consulted for antimicrobial recommendations.     Prior hospital charts reviewed [Yes]  Primary team notes reviewed [Yes]  Other consultant notes reviewed [Yes]    REVIEW OF SYSTEMS: cannot obtain further history from the patient secondary to altered mental status or sedation    PAST MEDICAL & SURGICAL HISTORY:  Down syndrome      Osteoporosis      Mild anemia      Neuropathy      S/P debridement  of R hip on 3/2/21    SOCIAL HISTORY: cannot obtain further history from the patient secondary to altered mental status or sedation    FAMILY HISTORY: cannot obtain further history from the patient secondary to altered mental status or sedation      ANTIMICROBIALS:  meropenem  IVPB 1000 every 8 hours  vancomycin  IVPB 500 every 12 hours      ANTIMICROBIALS (past 90 days):  MEDICATIONS  (STANDING):    cefepime   IVPB   100 mL/Hr IV Intermittent (01-20-24 @ 17:15)    levoFLOXacin IVPB   100 mL/Hr IV Intermittent (01-20-24 @ 16:17)    meropenem  IVPB   100 mL/Hr IV Intermittent (01-21-24 @ 13:27)   100 mL/Hr IV Intermittent (01-21-24 @ 11:37)   100 mL/Hr IV Intermittent (01-21-24 @ 02:34)    piperacillin/tazobactam IVPB.   200 mL/Hr IV Intermittent (01-20-24 @ 20:02)    vancomycin  IVPB   100 mL/Hr IV Intermittent (01-21-24 @ 01:38)        OTHER MEDS:   MEDICATIONS  (STANDING):  acetaminophen     Tablet .. 650 every 6 hours PRN  aluminum hydroxide/magnesium hydroxide/simethicone Suspension 30 every 4 hours PRN  enoxaparin Injectable 50 every 12 hours  gabapentin 300 every 12 hours  levETIRAcetam  IVPB 500 every 12 hours  melatonin 3 at bedtime  midodrine. 10 every 8 hours  norepinephrine Infusion 0.05 <Continuous>  ondansetron Injectable 4 every 8 hours PRN  pantoprazole  Injectable 40 every 24 hours  polyethylene glycol 3350 17 at bedtime  raloxifene 60 daily  senna 2 at bedtime      VITALS:  Vital Signs Last 24 Hrs  T(F): 99.7 (01-21-24 @ 12:00), Max: 105.3 (01-20-24 @ 21:54)    Vital Signs Last 24 Hrs  HR: 76 (01-21-24 @ 12:30) (64 - 147)  BP: 109/51 (01-21-24 @ 12:30) (83/51 - 137/69)  RR: 32 (01-21-24 @ 12:30)  SpO2: 96% (01-21-24 @ 12:30) (93% - 100%)  Wt(kg): --    EXAM:  GENERAL: NAD, On Bipap  HEAD: No head lesions  NECK: Supple. Intermittently opening eyes.   CHEST/LUNG: Shallow breath sounds.   HEART: S1 S2  ABDOMEN: Soft, nontender  EXTREMITIES: Extremities contracted.   NERVOUS SYSTEM: On Bipap.   MSK: No joint erythema, swelling or pain  SKIN: No rashes or lesions, no superficial thrombophlebitis    Labs:                        9.5    16.32 )-----------( 374      ( 21 Jan 2024 06:34 )             30.2     01-21    144  |  104  |  12  ----------------------------<  217<H>  3.7   |  23  |  0.9    Ca    7.7<L>      21 Jan 2024 06:34  Mg     2.0     01-21    TPro  6.3  /  Alb  2.9<L>  /  TBili  0.3  /  DBili  x   /  AST  26  /  ALT  24  /  AlkPhos  96  01-21      WBC Trend:  WBC Count: 16.32 (01-21-24 @ 06:34)  WBC Count: 8.61 (01-20-24 @ 16:15)      Auto Neutrophil #: 7.25 K/uL (01-20-24 @ 16:15)  Auto Neutrophil #: 8.92 K/uL (11-13-23 @ 07:42)  Auto Neutrophil #: 2.98 K/uL (11-08-23 @ 20:46)  Auto Neutrophil #: 3.94 K/uL (11-06-23 @ 07:35)  Auto Neutrophil #: 2.69 K/uL (11-05-23 @ 07:41)      Creatine Trend:  Creatinine: 0.9 (01-21)  Creatinine: 0.9 (01-20)      Liver Biochemical Testing Trend:  Alanine Aminotransferase (ALT/SGPT): 24 (01-21)  Alanine Aminotransferase (ALT/SGPT): 25 (01-20)  Alanine Aminotransferase (ALT/SGPT): 18 (11-13)  Alanine Aminotransferase (ALT/SGPT): 38 (11-10)  Alanine Aminotransferase (ALT/SGPT): 36 (11-08)  Aspartate Aminotransferase (AST/SGOT): 26 (01-21-24 @ 06:34)  Aspartate Aminotransferase (AST/SGOT): 25 (01-20-24 @ 16:15)  Aspartate Aminotransferase (AST/SGOT): 17 (11-13-23 @ 07:42)  Aspartate Aminotransferase (AST/SGOT): 29 (11-10-23 @ 01:56)  Aspartate Aminotransferase (AST/SGOT): 29 (11-08-23 @ 20:46)  Bilirubin Total: 0.3 (01-21)  Bilirubin Total: <0.2 (01-20)  Bilirubin Total: <0.2 (11-13)  Bilirubin Total: <0.2 (11-10)  Bilirubin Total: <0.2 (11-08)      Trend LDH      Auto Eosinophil %: 1.0 % (01-20-24 @ 16:15)      Urinalysis Basic - ( 21 Jan 2024 06:34 )    Color: x / Appearance: x / SG: x / pH: x  Gluc: 217 mg/dL / Ketone: x  / Bili: x / Urobili: x   Blood: x / Protein: x / Nitrite: x   Leuk Esterase: x / RBC: x / WBC x   Sq Epi: x / Non Sq Epi: x / Bacteria: x        MICROBIOLOGY:    Female                                          D-Dimer Assay, Quantitative: 605 (01-20)        Troponin T, High Sensitivity Result: 35 (01-21)  Troponin T, High Sensitivity Result: 35 (01-21)  Troponin T, High Sensitivity Result: 37 (01-20)  Troponin T, High Sensitivity Result: 25 (01-20)    Lactate, Blood: 1.2 (01-21 @ 06:34)  Blood Gas Arterial, Lactate: 0.8 (01-21 @ 00:47)  Blood Gas Venous - Lactate: 1.8 (01-20 @ 21:35)  Blood Gas Venous - Lactate: 2.1 (01-20 @ 16:34)        INFLAMMATORY MARKERS      RADIOLOGY & ADDITIONAL TESTS:  I have personally reviewed the imagings.  CXR  Xray Chest 1 View AP/PA:   ACC: [id number] EXAM:  XR CHEST 1 VIEW   ORDERED BY: COLTON MAYO     PROCEDURE DATE:  01/21/2024          INTERPRETATION:  Clinical History / Reason for exam: 58-year-old female   with sepsis, post central line placement.    Comparison : Chest radiograph performed on/20/2024 at 4:55 PM.    Technique/Positioning: AP view.    Findings:    Support devices: Precordial leads are present.  New right jugular vein   central venous catheter is present, with tip in the superior vena cava.    Cardiac/mediastinum/hilum: The cardiac silhouette is magnified.    Lung parenchyma/Pleura: Previously reported diminishing bilateral   pulmonary opacities are stable, No pleural effusion or pneumothorax is   seen.    Skeleton/soft tissues: Stable bones.    Impression:    Stable, diminishing bilateral pulmonary opacities.        --- End of Report ---            TOÑITO EDWARDS MD; Attending Radiologist  This document has been electronically signed. Jan 21 2024  9:02AM (01-21-24 @ 02:31)      CT      CARDIOLOGY TESTING             TEA ECHAVARRIA  58y, Female  Allergies    No Known Allergies    Intolerances    LOS  1d    HPI  HPI:  58F w/ PMHx Down Syndrome, nonverbal at baseline, Hypothyroidism, Cerebral palsy and Seizure Disorders presents to the ED from nursing facility/group home ([facility] presented to ED for respiratory distress and high grade fever. Patient found to be septic on admission. As per aide Janette at bedside, patient had been coughing and congested for 3x days. Denies fevers, chills, n/v/d or pain prior to admission. Patient is on a puree diet at baseline.    Vitals: Temp 104.5F (rectal), /74, , RR 24, SpO2 100% on BiPAP    Labs: Hgb 9.5 (previously 11.1), Platelet 422, INR 1.43, VBG Lactate 2.1    Imaging: CXR shows possible RML infiltrate    In the ED:  - s/p Cefepime 2g IV x1  - s/p Levaquin 750mg IV x1  - s/p 2L LR bolus    Admitted to SDU for management of acute respiratory distress 2/2 CAP/Aspiration PNA (20 Jan 2024 18:22)      INFECTIOUS DISEASE HISTORY:  Hospital course-  ID consulted for antimicrobial recommendations.     Prior hospital charts reviewed [Yes]  Primary team notes reviewed [Yes]  Other consultant notes reviewed [Yes]    REVIEW OF SYSTEMS: cannot obtain further history from the patient secondary to altered mental status or sedation    PAST MEDICAL & SURGICAL HISTORY:  Down syndrome      Osteoporosis      Mild anemia      Neuropathy      S/P debridement  of R hip on 3/2/21    SOCIAL HISTORY: cannot obtain further history from the patient secondary to altered mental status or sedation    FAMILY HISTORY: cannot obtain further history from the patient secondary to altered mental status or sedation      ANTIMICROBIALS:  meropenem  IVPB 1000 every 8 hours  vancomycin  IVPB 500 every 12 hours      ANTIMICROBIALS (past 90 days):  MEDICATIONS  (STANDING):    cefepime   IVPB   100 mL/Hr IV Intermittent (01-20-24 @ 17:15)    levoFLOXacin IVPB   100 mL/Hr IV Intermittent (01-20-24 @ 16:17)    meropenem  IVPB   100 mL/Hr IV Intermittent (01-21-24 @ 13:27)   100 mL/Hr IV Intermittent (01-21-24 @ 11:37)   100 mL/Hr IV Intermittent (01-21-24 @ 02:34)    piperacillin/tazobactam IVPB.   200 mL/Hr IV Intermittent (01-20-24 @ 20:02)    vancomycin  IVPB   100 mL/Hr IV Intermittent (01-21-24 @ 01:38)        OTHER MEDS:   MEDICATIONS  (STANDING):  acetaminophen     Tablet .. 650 every 6 hours PRN  aluminum hydroxide/magnesium hydroxide/simethicone Suspension 30 every 4 hours PRN  enoxaparin Injectable 50 every 12 hours  gabapentin 300 every 12 hours  levETIRAcetam  IVPB 500 every 12 hours  melatonin 3 at bedtime  midodrine. 10 every 8 hours  norepinephrine Infusion 0.05 <Continuous>  ondansetron Injectable 4 every 8 hours PRN  pantoprazole  Injectable 40 every 24 hours  polyethylene glycol 3350 17 at bedtime  raloxifene 60 daily  senna 2 at bedtime      VITALS:  Vital Signs Last 24 Hrs  T(F): 99.7 (01-21-24 @ 12:00), Max: 105.3 (01-20-24 @ 21:54)    Vital Signs Last 24 Hrs  HR: 76 (01-21-24 @ 12:30) (64 - 147)  BP: 109/51 (01-21-24 @ 12:30) (83/51 - 137/69)  RR: 32 (01-21-24 @ 12:30)  SpO2: 96% (01-21-24 @ 12:30) (93% - 100%)  Wt(kg): --    EXAM:  GENERAL: NAD, On Bipap  HEAD: No head lesions  NECK: Supple. Intermittently opening eyes.   CHEST/LUNG: Shallow breath sounds.   HEART: S1 S2  ABDOMEN: Soft, nontender  EXTREMITIES: Extremities contracted.   NERVOUS SYSTEM: On Bipap.   MSK: No joint erythema, swelling or pain  SKIN: No rashes or lesions, no superficial thrombophlebitis    Labs:                        9.5    16.32 )-----------( 374      ( 21 Jan 2024 06:34 )             30.2     01-21    144  |  104  |  12  ----------------------------<  217<H>  3.7   |  23  |  0.9    Ca    7.7<L>      21 Jan 2024 06:34  Mg     2.0     01-21    TPro  6.3  /  Alb  2.9<L>  /  TBili  0.3  /  DBili  x   /  AST  26  /  ALT  24  /  AlkPhos  96  01-21      WBC Trend:  WBC Count: 16.32 (01-21-24 @ 06:34)  WBC Count: 8.61 (01-20-24 @ 16:15)      Auto Neutrophil #: 7.25 K/uL (01-20-24 @ 16:15)  Auto Neutrophil #: 8.92 K/uL (11-13-23 @ 07:42)  Auto Neutrophil #: 2.98 K/uL (11-08-23 @ 20:46)  Auto Neutrophil #: 3.94 K/uL (11-06-23 @ 07:35)  Auto Neutrophil #: 2.69 K/uL (11-05-23 @ 07:41)      Creatine Trend:  Creatinine: 0.9 (01-21)  Creatinine: 0.9 (01-20)      Liver Biochemical Testing Trend:  Alanine Aminotransferase (ALT/SGPT): 24 (01-21)  Alanine Aminotransferase (ALT/SGPT): 25 (01-20)  Alanine Aminotransferase (ALT/SGPT): 18 (11-13)  Alanine Aminotransferase (ALT/SGPT): 38 (11-10)  Alanine Aminotransferase (ALT/SGPT): 36 (11-08)  Aspartate Aminotransferase (AST/SGOT): 26 (01-21-24 @ 06:34)  Aspartate Aminotransferase (AST/SGOT): 25 (01-20-24 @ 16:15)  Aspartate Aminotransferase (AST/SGOT): 17 (11-13-23 @ 07:42)  Aspartate Aminotransferase (AST/SGOT): 29 (11-10-23 @ 01:56)  Aspartate Aminotransferase (AST/SGOT): 29 (11-08-23 @ 20:46)  Bilirubin Total: 0.3 (01-21)  Bilirubin Total: <0.2 (01-20)  Bilirubin Total: <0.2 (11-13)  Bilirubin Total: <0.2 (11-10)  Bilirubin Total: <0.2 (11-08)      Trend LDH      Auto Eosinophil %: 1.0 % (01-20-24 @ 16:15)      Urinalysis Basic - ( 21 Jan 2024 06:34 )    Color: x / Appearance: x / SG: x / pH: x  Gluc: 217 mg/dL / Ketone: x  / Bili: x / Urobili: x   Blood: x / Protein: x / Nitrite: x   Leuk Esterase: x / RBC: x / WBC x   Sq Epi: x / Non Sq Epi: x / Bacteria: x        MICROBIOLOGY:    Female                                          D-Dimer Assay, Quantitative: 605 (01-20)        Troponin T, High Sensitivity Result: 35 (01-21)  Troponin T, High Sensitivity Result: 35 (01-21)  Troponin T, High Sensitivity Result: 37 (01-20)  Troponin T, High Sensitivity Result: 25 (01-20)    Lactate, Blood: 1.2 (01-21 @ 06:34)  Blood Gas Arterial, Lactate: 0.8 (01-21 @ 00:47)  Blood Gas Venous - Lactate: 1.8 (01-20 @ 21:35)  Blood Gas Venous - Lactate: 2.1 (01-20 @ 16:34)        INFLAMMATORY MARKERS      RADIOLOGY & ADDITIONAL TESTS:  I have personally reviewed the imagings.  CXR  Xray Chest 1 View AP/PA:   ACC: [id number] EXAM:  XR CHEST 1 VIEW   ORDERED BY: COLTON MAYO     PROCEDURE DATE:  01/21/2024          INTERPRETATION:  Clinical History / Reason for exam: 58-year-old female   with sepsis, post central line placement.    Comparison : Chest radiograph performed on/20/2024 at 4:55 PM.    Technique/Positioning: AP view.    Findings:    Support devices: Precordial leads are present.  New right jugular vein   central venous catheter is present, with tip in the superior vena cava.    Cardiac/mediastinum/hilum: The cardiac silhouette is magnified.    Lung parenchyma/Pleura: Previously reported diminishing bilateral   pulmonary opacities are stable, No pleural effusion or pneumothorax is   seen.    Skeleton/soft tissues: Stable bones.    Impression:    Stable, diminishing bilateral pulmonary opacities.        --- End of Report ---            TOÑITO EDWARDS MD; Attending Radiologist  This document has been electronically signed. Jan 21 2024  9:02AM (01-21-24 @ 02:31)      CT      CARDIOLOGY TESTING

## 2024-01-21 NOTE — ED ADULT NURSE REASSESSMENT NOTE - NS ED NURSE REASSESS COMMENT FT1
patient awake/alert/baseline mental status. v/s stable on levophed and bipap. no acute distress. iv patent. safety maintained.

## 2024-01-21 NOTE — CONSULT NOTE ADULT - ASSESSMENT
ASSESSMENT  58F w/ PMHx Down Syndrome, nonverbal at baseline, Hypothyroidism, Cerebral palsy and Seizure Disorders presents to the ED from nursing facility/group home presented to ED for respiratory distress and high grade fever.     IMPRESSION  #Acute hypoxic respiratory failure- Gram Negative pneumonia   #Severe Sepsis on admission  #Full thickness ulcer right heel- Appreciate burn/podiatry evaluation.  #History of Right planter foot ulcers - two full thickness ulcers - serous drainage with mild erythema with OM  - MR Foot No Cont, Right (10.16.23 @ 21:51): IMPRESSION: 1.  Limited exam. 2.  Osteomyelitis of the first metatarsal stump. 3.  Osteomyelitis of the second toe distal phalanx.  - s/p excidional debridement to and including bone 1st metatarsal with partial 2nd digit amputation - 1st metatarsal head resected - Wound Cx Proteus ESBL   #Obesity BMI (kg/m2): 28.9  #    #Seizure like activity         #Rapid Response 10/17   #HAP   - Xray Chest 1 View- PORTABLE-Urgent (Xray Chest 1 View- PORTABLE-Urgent .) (10.17.23 @ 06:10): Worsening pneumonia, left lower lung.  - trach Cx 10/17 NG   - Procalcitonin, Serum: 4.36(10.17.23 @ 17:20)      #Elevated Fungitell   Fungitell: >500 pg/mL (10.17.23 @ 17:20)    #Buttock decubitus ulcer     #Recently Treated Multifocal PNA    #Down syndrome/Crebral Palsy   #Obesity BMI (kg/m2): 23.7, 26.3  #Abx allergy: No Known Allergies    RECOMMENDATIONS  - s/p 2nd digit amputation with grossly clean margins -- 1st metatarsal head also resected       RECOMMENDATIONS  This is an incomplete consult note. All final recommendations to follow after interview and examination of the patient. Please follow recommendations noted below.    If any questions, please send a message or call on [employer] Teams  Please continue to update ID with any pertinent new laboratory or radiographic findings.    Efrain Palm M.D  Infectious Diseases Attending/   [employer] at \[facility]\""/[facility]   ASSESSMENT  58F w/ PMHx Down Syndrome, nonverbal at baseline, Hypothyroidism, Cerebral palsy and Seizure Disorders presents to the ED from nursing facility/group home presented to ED for respiratory distress and high grade fever.     IMPRESSION  #Acute hypoxic respiratory failure- Gram Negative pneumonia   #Severe Sepsis on admission  #Full thickness ulcer right heel- Appreciate burn/podiatry evaluation.  #History of Right planter foot ulcers - two full thickness ulcers - serous drainage with mild erythema with OM  - MR Foot No Cont, Right (10.16.23 @ 21:51): IMPRESSION: 1.  Limited exam. 2.  Osteomyelitis of the first metatarsal stump. 3.  Osteomyelitis of the second toe distal phalanx.  - s/p excidional debridement to and including bone 1st metatarsal with partial 2nd digit amputation - 1st metatarsal head resected - Wound Cx Proteus ESBL   #CKD 2-3  #Obesity BMI (kg/m2): 28.9  #History of buttock ulcer  #Down syndrome/Cerebral Palsy     RECOMMENDATIONS  -Please obtain Sputum cultures when able. Check MRSA nares.  -Follow up with blood cultures.   -For now it is reasonable to keep on IV Vancomycin 500mg Q 12 hours. Check levels before 3rd dose.   -D/C vanc if mrsa nares is negative.  -Continue with IV meropenem 1 gram q 8 hours.  -Offloading. Aspiration precautions.   -Prognosis guarded.     If any questions, please send a message or call on [employer] Teams  Please continue to update ID with any pertinent new laboratory or radiographic findings.    Efrain Palm M.D  Infectious Diseases Attending/   [employer] at [facility]

## 2024-01-21 NOTE — CONSULT NOTE ADULT - SUBJECTIVE AND OBJECTIVE BOX
Patient is a 58y old  Female who presents with a chief complaint of Respiratory Distress (21 Jan 2024 06:31)      HPI:  58F w/ PMHx Down Syndrome, nonverbal at baseline, Hypothyroidism, Cerebral palsy and Seizure Disorders presents to the ED from nursing facility/group home ([facility]) presented to ED for respiratory distress and high grade fever. Patient found to be septic on admission. As per aide Janette at bedside, patient had been coughing and congested for 3x days. Denies fevers, chills, n/v/d or pain prior to admission. Patient is on a puree diet at baseline.    Vitals: Temp 104.5F (rectal), /74, , RR 24, SpO2 100% on BiPAP    Labs: Hgb 9.5 (previously 11.1), Platelet 422, INR 1.43, VBG Lactate 2.1    Imaging: CXR shows possible RML infiltrate    In the ED:  - s/p Cefepime 2g IV x1  - s/p Levaquin 750mg IV x1  - s/p 2L LR bolus    Admitted to SDU for management of acute respiratory distress 2/2 CAP/Aspiration PNA (20 Jan 2024 18:22)      Pt seen today with attending on AM rounds. We were called for evaluation of R necrotic wound of right foot.     PAST MEDICAL & SURGICAL HISTORY:  Down syndrome      Osteoporosis      Mild anemia      Neuropathy      S/P debridement  of R hip on 3/2/21      Allergies  No Known Allergies  Intolerances      PHYSICAL EXAM:  GENERAL: Ill appearing   HEAD:  Atraumatic, Normocephalic  EYES: EOMI, PERRLA, conjunctiva and sclera clear  CHEST/LUNG: crackles, rhonchi b/l;   ABDOMEN: Soft, Nontender, Nondistended  EXTREMITIES:  2+ Peripheral Pulses, No clubbing, cyanosis, or edema  Neuro: AAOx0,   SKIN: Full thickness ulceration to R heel with necrotic wound base.   dressing changed, pt tolerated well.

## 2024-01-21 NOTE — CONSULT NOTE ADULT - SUBJECTIVE AND OBJECTIVE BOX
Patient is a 58y old  Female who presents with a chief complaint of sob (2024 18:22)      HPI:  58F w/ PMHx Down Syndrome, nonverbal at baseline, Hypothyroidism, Cerebral palsy and Seizure Disorders presents to the ED from nursing facility/group home for respiratory distress and high grade fever. Patient found to be septic on admission. As per aide Janette at bedside, patient had been coughing and congested for 3x days. Denies fevers, chills, n/v/d or pain prior to admission. Patient is on a puree diet at baseline.    Vitals: Temp 104.5F (rectal), /74, , RR 24, SpO2 100% on BiPAP    Labs: Hgb 9.5 (previously 11.1), Platelet 422, INR 1.43, VBG Lactate 2.1      In the ED:  - s/p Cefepime 2g IV x1  - s/p Levaquin 750mg IV x1  - s/p 2L LR bolus    Admitted to SDU for management of acute respiratory distress 2/2 CAP/Aspiration PNA (2024 18:22) Overnight hypotensive, sp TLC started on levophed upgraded to MICU      PAST MEDICAL & SURGICAL HISTORY:  Down syndrome      Osteoporosis      Mild anemia      Neuropathy      S/P debridement  of R hip on 3/2/21          SOCIAL HX:   Smoking    -      REVIEW OF SYSTEMS SEE HPI    Allergies    No Known Allergies    Intolerances        acetaminophen     Tablet .. 650 milliGRAM(s) Oral every 6 hours PRN  aluminum hydroxide/magnesium hydroxide/simethicone Suspension 30 milliLiter(s) Oral every 4 hours PRN  artificial  tears Solution 1 Drop(s) Both EYES two times a day  calcium carbonate   1250 mG (OsCal) 1 Tablet(s) Oral two times a day  enoxaparin Injectable 50 milliGRAM(s) SubCutaneous every 12 hours  gabapentin 300 milliGRAM(s) Oral every 12 hours  lactated ringers. 1000 milliLiter(s) IV Continuous <Continuous>  levETIRAcetam  IVPB 500 milliGRAM(s) IV Intermittent every 12 hours  melatonin 3 milliGRAM(s) Oral at bedtime  meropenem  IVPB 1000 milliGRAM(s) IV Intermittent every 8 hours  midodrine. 10 milliGRAM(s) Oral every 8 hours  norepinephrine Infusion 0.05 MICROgram(s)/kG/Min IV Continuous <Continuous>  ondansetron Injectable 4 milliGRAM(s) IV Push every 8 hours PRN  pantoprazole  Injectable 40 milliGRAM(s) IV Push every 24 hours  polyethylene glycol 3350 17 Gram(s) Oral at bedtime  raloxifene 60 milliGRAM(s) Oral daily  senna 2 Tablet(s) Oral at bedtime  vancomycin  IVPB 500 milliGRAM(s) IV Intermittent every 12 hours  : Home Meds:      PHYSICAL EXAM    ICU Vital Signs Last 24 Hrs  T(C): 37.6 (2024 04:32), Max: 40.7 (2024 21:54)  T(F): 99.7 (2024 04:32), Max: 105.3 (2024 21:54)  HR: 94 (2024 04:32) (94 - 147)  BP: 87/43 (2024 04:32) (85/42 - 118/79)  BP(mean): 62 (2024 04:32) (58 - 89  RR: 16 (2024 04:32) (16 - 38)  SpO2: 95% (2024 04:32) (95% - 100%)    O2 Parameters below as of 2024 04:32  Patient On (Oxygen Delivery Method): BiPAP/CPAP            General: ILL looking, tachypneic  Lungs: Bilateral rhonchi  Cardiovascular: Regular  Abdomen: Soft, Positive BS  Extremities: No clubbing  Neurological: Non focal  Foot ulcer       24 @ 07:01  -  24 @ 06:31  --------------------------------------------------------  IN:  Total IN: 0 mL    OUT:    Indwelling Catheter - Urethral (mL): 600 mL  Total OUT: 600 mL    Total NET: -600 mL          LABS:                          9.5    8.61  )-----------( 422      ( 2024 16:15 )             30.8                                                   141  |  103  |  15  ----------------------------<  136<H>  4.5   |  29  |  0.9    Ca    7.7<L>      2024 16:15    TPro  6.5  /  Alb  3.2<L>  /  TBili  <0.2  /  DBili  x   /  AST  25  /  ALT  25  /  AlkPhos  103  01-20      PT/INR - ( 2024 16:15 )   PT: 16.40 sec;   INR: 1.43 ratio         PTT - ( 2024 16:15 )  PTT:38.6 sec                                       Urinalysis Basic - ( 2024 05:00 )    Color: Yellow / Appearance: Clear / S.012 / pH: x  Gluc: x / Ketone: Trace mg/dL  / Bili: Negative / Urobili: 0.2 mg/dL   Blood: x / Protein: Negative mg/dL / Nitrite: Negative   Leuk Esterase: Negative / RBC: x / WBC x   Sq Epi: x / Non Sq Epi: x / Bacteria: x                                                  LIVER FUNCTIONS - ( 2024 16:15 )  Alb: 3.2 g/dL / Pro: 6.5 g/dL / ALK PHOS: 103 U/L / ALT: 25 U/L / AST: 25 U/L / GGT: x                                                                                                                                   ABG - ( 2024 00:47 )  pH, Arterial: 7.45  pH, Blood: x     /  pCO2: 40    /  pO2: 81    / HCO3: 28    / Base Excess: 3.5   /  SaO2: 96.3                    MEDICATIONS  (STANDING):  artificial  tears Solution 1 Drop(s) Both EYES two times a day  calcium carbonate   1250 mG (OsCal) 1 Tablet(s) Oral two times a day  enoxaparin Injectable 50 milliGRAM(s) SubCutaneous every 12 hours  gabapentin 300 milliGRAM(s) Oral every 12 hours  lactated ringers. 1000 milliLiter(s) (60 mL/Hr) IV Continuous <Continuous>  levETIRAcetam  IVPB 500 milliGRAM(s) IV Intermittent every 12 hours  melatonin 3 milliGRAM(s) Oral at bedtime  meropenem  IVPB 1000 milliGRAM(s) IV Intermittent every 8 hours  midodrine. 10 milliGRAM(s) Oral every 8 hours  norepinephrine Infusion 0.05 MICROgram(s)/kG/Min (4.35 mL/Hr) IV Continuous <Continuous>  pantoprazole  Injectable 40 milliGRAM(s) IV Push every 24 hours  polyethylene glycol 3350 17 Gram(s) Oral at bedtime  raloxifene 60 milliGRAM(s) Oral daily  senna 2 Tablet(s) Oral at bedtime  vancomycin  IVPB 500 milliGRAM(s) IV Intermittent every 12 hours    MEDICATIONS  (PRN):  acetaminophen     Tablet .. 650 milliGRAM(s) Oral every 6 hours PRN Temp greater or equal to 38C (100.4F), Mild Pain (1 - 3)  aluminum hydroxide/magnesium hydroxide/simethicone Suspension 30 milliLiter(s) Oral every 4 hours PRN Dyspepsia  ondansetron Injectable 4 milliGRAM(s) IV Push every 8 hours PRN Nausea and/or Vomiting  cxr NOTED

## 2024-01-21 NOTE — CONSULT NOTE ADULT - SUBJECTIVE AND OBJECTIVE BOX
Podiatry Consult Note    Subjective:  TEA ECHAVARRIA  Seen Bedside 58y Female  .   Patient is a 58y old  Female who presents with a chief complaint of Respiratory Distress (21 Jan 2024 11:00)    HPI:  58F w/ PMHx Down Syndrome, nonverbal at baseline, Hypothyroidism, Cerebral palsy and Seizure Disorders presents to the ED from nursing facility/group home ([facility]) presented to ED for respiratory distress and high grade fever. Patient found to be septic on admission. As per aide Janette at bedside, patient had been coughing and congested for 3x days. Denies fevers, chills, n/v/d or pain prior to admission. Patient is on a puree diet at baseline.    Vitals: Temp 104.5F (rectal), /74, , RR 24, SpO2 100% on BiPAP    Labs: Hgb 9.5 (previously 11.1), Platelet 422, INR 1.43, VBG Lactate 2.1    Imaging: CXR shows possible RML infiltrate    In the ED:  - s/p Cefepime 2g IV x1  - s/p Levaquin 750mg IV x1  - s/p 2L LR bolus    Admitted to SDU for management of acute respiratory distress 2/2 CAP/Aspiration PNA (20 Jan 2024 18:22)      Past Medical History and Surgical History  PAST MEDICAL & SURGICAL HISTORY:  Down syndrome      Osteoporosis      Mild anemia      Neuropathy      S/P debridement  of R hip on 3/2/21           Review of Systems:  [X] Ten point review of systems is otherwise negative except as noted     Objective:  Vital Signs Last 24 Hrs  T(C): 36.8 (21 Jan 2024 08:00), Max: 40.7 (20 Jan 2024 21:54)  T(F): 98.3 (21 Jan 2024 08:00), Max: 105.3 (20 Jan 2024 21:54)  HR: 112 (21 Jan 2024 11:45) (64 - 147)  BP: 65/32 (21 Jan 2024 11:45) (65/32 - 137/69)  BP(mean): 43 (21 Jan 2024 11:45) (43 - 94)  RR: 28 (21 Jan 2024 11:45) (16 - 38)  SpO2: 94% (21 Jan 2024 11:45) (94% - 100%)    Parameters below as of 21 Jan 2024 12:00  Patient On (Oxygen Delivery Method): BiPAP/CPAP    O2 Concentration (%): 40                        9.5    16.32 )-----------( 374      ( 21 Jan 2024 06:34 )             30.2                 01-21    144  |  104  |  12  ----------------------------<  217<H>  3.7   |  23  |  0.9    Ca    7.7<L>      21 Jan 2024 06:34  Mg     2.0     01-21    TPro  6.3  /  Alb  2.9<L>  /  TBili  0.3  /  DBili  x   /  AST  26  /  ALT  24  /  AlkPhos  96  01-21        Physical Exam - Lower Extremity Focused:   Derm: Full thickness ulceration to R heel with necrotic wound base. Left foot superficial wounds no signs of infection  Vascular: DP and PT Pulses Diminished; Foot is Warm to Warm to the touch; Capillary Refill Time < 3 Seconds;    Neuro: Protective Sensation Diminished / Moderately Neuropathic   MSK: Pain On Palpation at Wound Site     Assessment:  full thickness ulcer right heel    Plan:  Chart reviewed and Patient evaluated. All Questions and Concerns Addressed and Answered  XR Imaging  Foot; Pending Results   Local Wound Care;  continue dressings as per burn recs  Weight Bearing Status; WBAT  Request ID Consult  No surgical intervention at this time; will continue with local wound care and re-assess patient clinically and if respiratory symptoms resolve before plans for any debridement  Discussed Plan w/ Dr Everett     Podiatry

## 2024-01-21 NOTE — CONSULT NOTE ADULT - ASSESSMENT
IMPRESSION:    Acute hypoxemic respiratory failure  Sepsis POA  Septic shock  Recurrent aspiration pneumonia/ prior intubation  HO GI bleed  HO OM  HO recent duodenal perforation   HO polymicrobial bacteremia   H/o CP  H/o seizures    PLAN:    CNS:  Avoid CNS Depressant    HEENT: Oral care.  ET care     PULMONARY: HOB at 45 degrees.  Aspiration precaution.  low threshold for intubation, keep SAO2 92 TO 96%    CARDIOVASCULAR: Goal directed fluid resuscitation.  Taper pressors    GI: Protonix, NPO    RENAL:    FU lytes.  Correct as needed.      INFECTIOUS DISEASE:  ABX, pancx, ID eval, RVP    HEMATOLOGICAL: DVT prophylaxis se. LE doppler    ENDOCRINE:  Follow up FS.  Insulin protocol if needed.    MUSCULOSKELETAL: Bedrest.  Off loading.  Wound care.      Prognosis very poor    MICU

## 2024-01-21 NOTE — ED ADULT NURSE NOTE - NSFALLRISKINTERV_ED_ALL_ED

## 2024-01-21 NOTE — PATIENT PROFILE ADULT - FALL HARM RISK - HARM RISK INTERVENTIONS

## 2024-01-21 NOTE — EEG REPORT - NS EEG TEXT BOX
[facility]   COMPREHENSIVE EPILEPSY CENTER   REPORT OF ROUTINE VIDEO EEG     [facility]: [address] [place] [address] , [address], [place] [address], Ph#: [phone]  [facility]: [id number], [place] [address], Ph#: [phone]  Office: [address] [place] [address], [address], [place] [address] Ph#: [phone]    Patient Name: TEA ECHAVARRIA  Age and : 58y (67)  MRN #: [id number]  Location: [address]  Referring Physician: Felipe Sinclair    Study Date: 24    _____________________________________________________________  TECHNICAL INFORMATION    Placement and Labeling of Electrodes:  The EEG was performed utilizing 20 channels referential EEG connections (coronal over temporal over parasagittal montage) using all standard 10-20 electrode placements with EKG.  Recording was at a sampling rate of 256 samples per second per channel.  Time synchronized digital video recording was done simultaneously with EEG recording.  A low light infrared camera was used for low light recording.  Dhruv and seizure detection algorithms were utilized.    _____________________________________________________________  HISTORY    Patient is a 58y old  Female who presents with a chief complaint of Respiratory Distress (2024 14:22)      PERTINENT MEDICATION:  MEDICATIONS  (STANDING):  artificial  tears Solution 1 Drop(s) Both EYES two times a day  calcium carbonate   1250 mG (OsCal) 1 Tablet(s) Oral two times a day  enoxaparin Injectable 50 milliGRAM(s) SubCutaneous every 12 hours  gabapentin 300 milliGRAM(s) Oral every 12 hours  lactated ringers. 1000 milliLiter(s) (60 mL/Hr) IV Continuous <Continuous>  levETIRAcetam  IVPB 500 milliGRAM(s) IV Intermittent every 12 hours  melatonin 3 milliGRAM(s) Oral at bedtime  meropenem  IVPB 1000 milliGRAM(s) IV Intermittent every 8 hours  midodrine. 10 milliGRAM(s) Oral every 8 hours  norepinephrine Infusion 0.05 MICROgram(s)/kG/Min (2.45 mL/Hr) IV Continuous <Continuous>  pantoprazole  Injectable 40 milliGRAM(s) IV Push every 24 hours  polyethylene glycol 3350 17 Gram(s) Oral at bedtime  raloxifene 60 milliGRAM(s) Oral daily  senna 2 Tablet(s) Oral at bedtime  silver sulfADIAZINE 1% Cream 1 Application(s) Topical two times a day  vancomycin  IVPB 500 milliGRAM(s) IV Intermittent every 12 hours    _____________________________________________________________  STUDY INTERPRETATION    Findings: The background was continuous, spontaneously variable and reactive. During wakefulness, the posterior dominant rhythm was not clearly discernable.     Background Slowing:  -Continuous diffuse theta and polymorphic delta slowing.    Focal Slowing:   None was present.    Sleep Background:  Drowsiness was characterized by fragmentation, attenuation, and slowing of the background activity.      Other Non-Epileptiform Findings:  None were present.    Interictal Epileptiform Activity:   None were present.    Events:  Clinical events: None recorded.  Seizures: None recorded.    Artifacts:  Intermittent myogenic and movement artifacts were noted.    ECG:  The heart rate on single channel ECG was predominantly between 60-80 BPM.    _____________________________________________________________  EEG SUMMARY/CLASSIFICATION    Abnormal EEG in the awake, drowsy state.  - Moderate generalized slowing.    _____________________________________________________________  EEG IMPRESSION/CLINICAL CORRELATE    Abnormal EEG study.  1. Moderate nonspecific diffuse or multifocal cerebral dysfunction.   2. No epileptiform abnormalities were recorded.    Feliciano Jimenez MD  Neurology Attending Physician

## 2024-01-22 LAB
ALBUMIN SERPL ELPH-MCNC: 2.9 G/DL — LOW (ref 3.5–5.2)
ALP SERPL-CCNC: 88 U/L — SIGNIFICANT CHANGE UP (ref 30–115)
ALT FLD-CCNC: 25 U/L — SIGNIFICANT CHANGE UP (ref 0–41)
ANION GAP SERPL CALC-SCNC: 10 MMOL/L — SIGNIFICANT CHANGE UP (ref 7–14)
AST SERPL-CCNC: 26 U/L — SIGNIFICANT CHANGE UP (ref 0–41)
BILIRUB SERPL-MCNC: 0.2 MG/DL — SIGNIFICANT CHANGE UP (ref 0.2–1.2)
BUN SERPL-MCNC: 7 MG/DL — LOW (ref 10–20)
CALCIUM SERPL-MCNC: 7.9 MG/DL — LOW (ref 8.4–10.4)
CHLORIDE SERPL-SCNC: 100 MMOL/L — SIGNIFICANT CHANGE UP (ref 98–110)
CO2 SERPL-SCNC: 30 MMOL/L — SIGNIFICANT CHANGE UP (ref 17–32)
CREAT SERPL-MCNC: 0.6 MG/DL — LOW (ref 0.7–1.5)
CULTURE RESULTS: ABNORMAL
CULTURE RESULTS: NO GROWTH — SIGNIFICANT CHANGE UP
EGFR: 105 ML/MIN/1.73M2 — SIGNIFICANT CHANGE UP
FERRITIN SERPL-MCNC: 128 NG/ML — SIGNIFICANT CHANGE UP (ref 13–330)
FOLATE SERPL-MCNC: >20 NG/ML — SIGNIFICANT CHANGE UP
GLUCOSE SERPL-MCNC: 129 MG/DL — HIGH (ref 70–99)
HCT VFR BLD CALC: 29.1 % — LOW (ref 37–47)
HGB BLD-MCNC: 8.9 G/DL — LOW (ref 12–16)
MAGNESIUM SERPL-MCNC: 1.8 MG/DL — SIGNIFICANT CHANGE UP (ref 1.8–2.4)
MCHC RBC-ENTMCNC: 24.8 PG — LOW (ref 27–31)
MCHC RBC-ENTMCNC: 30.6 G/DL — LOW (ref 32–37)
MCV RBC AUTO: 81.1 FL — SIGNIFICANT CHANGE UP (ref 81–99)
MRSA PCR RESULT.: NEGATIVE — SIGNIFICANT CHANGE UP
NRBC # BLD: 0 /100 WBCS — SIGNIFICANT CHANGE UP (ref 0–0)
ORGANISM # SPEC MICROSCOPIC CNT: ABNORMAL
ORGANISM # SPEC MICROSCOPIC CNT: SIGNIFICANT CHANGE UP
PLATELET # BLD AUTO: 309 K/UL — SIGNIFICANT CHANGE UP (ref 130–400)
PMV BLD: 9.9 FL — SIGNIFICANT CHANGE UP (ref 7.4–10.4)
POTASSIUM SERPL-MCNC: 3.2 MMOL/L — LOW (ref 3.5–5)
POTASSIUM SERPL-SCNC: 3.2 MMOL/L — LOW (ref 3.5–5)
PROCALCITONIN SERPL-MCNC: 0.51 NG/ML — HIGH (ref 0.02–0.1)
PROT SERPL-MCNC: 6.1 G/DL — SIGNIFICANT CHANGE UP (ref 6–8)
RBC # BLD: 3.59 M/UL — LOW (ref 4.2–5.4)
RBC # FLD: 18.4 % — HIGH (ref 11.5–14.5)
S PNEUM AG UR QL: NEGATIVE — SIGNIFICANT CHANGE UP
SODIUM SERPL-SCNC: 140 MMOL/L — SIGNIFICANT CHANGE UP (ref 135–146)
SPECIMEN SOURCE: SIGNIFICANT CHANGE UP
SPECIMEN SOURCE: SIGNIFICANT CHANGE UP
TSH SERPL-MCNC: 0.51 UIU/ML — SIGNIFICANT CHANGE UP (ref 0.27–4.2)
VIT B12 SERPL-MCNC: 389 PG/ML — SIGNIFICANT CHANGE UP (ref 232–1245)
WBC # BLD: 9.53 K/UL — SIGNIFICANT CHANGE UP (ref 4.8–10.8)
WBC # FLD AUTO: 9.53 K/UL — SIGNIFICANT CHANGE UP (ref 4.8–10.8)

## 2024-01-22 PROCEDURE — 71045 X-RAY EXAM CHEST 1 VIEW: CPT | Mod: 26,77

## 2024-01-22 PROCEDURE — 71045 X-RAY EXAM CHEST 1 VIEW: CPT | Mod: 26

## 2024-01-22 PROCEDURE — 93325 DOPPLER ECHO COLOR FLOW MAPG: CPT | Mod: 26

## 2024-01-22 PROCEDURE — 93312 ECHO TRANSESOPHAGEAL: CPT | Mod: 26

## 2024-01-22 PROCEDURE — 93320 DOPPLER ECHO COMPLETE: CPT | Mod: 26

## 2024-01-22 PROCEDURE — 93970 EXTREMITY STUDY: CPT | Mod: 26

## 2024-01-22 PROCEDURE — 99291 CRITICAL CARE FIRST HOUR: CPT

## 2024-01-22 RX ORDER — LEVETIRACETAM 250 MG/1
500 TABLET, FILM COATED ORAL
Refills: 0 | Status: DISCONTINUED | OUTPATIENT
Start: 2024-01-22 | End: 2024-01-22

## 2024-01-22 RX ORDER — ACETAMINOPHEN 500 MG
650 TABLET ORAL ONCE
Refills: 0 | Status: COMPLETED | OUTPATIENT
Start: 2024-01-22 | End: 2024-01-22

## 2024-01-22 RX ORDER — CHLORHEXIDINE GLUCONATE 213 G/1000ML
1 SOLUTION TOPICAL DAILY
Refills: 0 | Status: DISCONTINUED | OUTPATIENT
Start: 2024-01-22 | End: 2024-04-08

## 2024-01-22 RX ORDER — POTASSIUM CHLORIDE 20 MEQ
20 PACKET (EA) ORAL
Refills: 0 | Status: COMPLETED | OUTPATIENT
Start: 2024-01-22 | End: 2024-01-22

## 2024-01-22 RX ORDER — ACETAMINOPHEN 500 MG
1000 TABLET ORAL ONCE
Refills: 0 | Status: COMPLETED | OUTPATIENT
Start: 2024-01-22 | End: 2024-01-22

## 2024-01-22 RX ORDER — LEVETIRACETAM 250 MG/1
500 TABLET, FILM COATED ORAL EVERY 12 HOURS
Refills: 0 | Status: DISCONTINUED | OUTPATIENT
Start: 2024-01-22 | End: 2024-02-21

## 2024-01-22 RX ADMIN — SENNA PLUS 2 TABLET(S): 8.6 TABLET ORAL at 21:09

## 2024-01-22 RX ADMIN — Medication 400 MILLIGRAM(S): at 01:26

## 2024-01-22 RX ADMIN — Medication 1 DROP(S): at 17:20

## 2024-01-22 RX ADMIN — GABAPENTIN 300 MILLIGRAM(S): 400 CAPSULE ORAL at 17:22

## 2024-01-22 RX ADMIN — Medication 1 TABLET(S): at 17:22

## 2024-01-22 RX ADMIN — PANTOPRAZOLE SODIUM 40 MILLIGRAM(S): 20 TABLET, DELAYED RELEASE ORAL at 05:12

## 2024-01-22 RX ADMIN — Medication 100 MILLIGRAM(S): at 05:12

## 2024-01-22 RX ADMIN — MIDODRINE HYDROCHLORIDE 10 MILLIGRAM(S): 2.5 TABLET ORAL at 13:17

## 2024-01-22 RX ADMIN — Medication 50 MILLIEQUIVALENT(S): at 13:15

## 2024-01-22 RX ADMIN — MEROPENEM 100 MILLIGRAM(S): 1 INJECTION INTRAVENOUS at 21:09

## 2024-01-22 RX ADMIN — Medication 650 MILLIGRAM(S): at 13:48

## 2024-01-22 RX ADMIN — ENOXAPARIN SODIUM 50 MILLIGRAM(S): 100 INJECTION SUBCUTANEOUS at 17:21

## 2024-01-22 RX ADMIN — Medication 650 MILLIGRAM(S): at 09:38

## 2024-01-22 RX ADMIN — Medication 1 DROP(S): at 05:11

## 2024-01-22 RX ADMIN — RALOXIFENE HYDROCHLORIDE 60 MILLIGRAM(S): 60 TABLET, COATED ORAL at 12:02

## 2024-01-22 RX ADMIN — ENOXAPARIN SODIUM 50 MILLIGRAM(S): 100 INJECTION SUBCUTANEOUS at 05:11

## 2024-01-22 RX ADMIN — Medication 3 MILLIGRAM(S): at 21:09

## 2024-01-22 RX ADMIN — Medication 100 MILLIGRAM(S): at 00:13

## 2024-01-22 RX ADMIN — MIDODRINE HYDROCHLORIDE 10 MILLIGRAM(S): 2.5 TABLET ORAL at 21:09

## 2024-01-22 RX ADMIN — Medication 1 APPLICATION(S): at 17:18

## 2024-01-22 RX ADMIN — Medication 1000 MILLIGRAM(S): at 01:56

## 2024-01-22 RX ADMIN — Medication 50 MILLIEQUIVALENT(S): at 10:49

## 2024-01-22 RX ADMIN — LEVETIRACETAM 400 MILLIGRAM(S): 250 TABLET, FILM COATED ORAL at 05:12

## 2024-01-22 RX ADMIN — MEROPENEM 100 MILLIGRAM(S): 1 INJECTION INTRAVENOUS at 13:18

## 2024-01-22 RX ADMIN — Medication 650 MILLIGRAM(S): at 13:19

## 2024-01-22 RX ADMIN — Medication 1 APPLICATION(S): at 05:10

## 2024-01-22 RX ADMIN — MEROPENEM 100 MILLIGRAM(S): 1 INJECTION INTRAVENOUS at 05:13

## 2024-01-22 RX ADMIN — CHLORHEXIDINE GLUCONATE 1 APPLICATION(S): 213 SOLUTION TOPICAL at 11:37

## 2024-01-22 RX ADMIN — Medication 50 MILLIEQUIVALENT(S): at 08:57

## 2024-01-22 RX ADMIN — LEVETIRACETAM 500 MILLIGRAM(S): 250 TABLET, FILM COATED ORAL at 17:22

## 2024-01-22 RX ADMIN — Medication 650 MILLIGRAM(S): at 09:08

## 2024-01-22 RX ADMIN — POLYETHYLENE GLYCOL 3350 17 GRAM(S): 17 POWDER, FOR SOLUTION ORAL at 22:37

## 2024-01-22 NOTE — PROGRESS NOTE ADULT - SUBJECTIVE AND OBJECTIVE BOX
Over Night Events: events noted, on HHFNC 60/60, LEVOPHED 0.13, LR 75 CC/H  PHYSICAL EXAM    ICU Vital Signs Last 24 Hrs  T(C): 38.4 (22 Jan 2024 08:00), Max: 38.4 (22 Jan 2024 08:00)  T(F): 101.1 (22 Jan 2024 08:00), Max: 101.1 (22 Jan 2024 08:00)  HR: 97 (22 Jan 2024 08:00) (64 - 123)  BP: 116/64 (22 Jan 2024 08:00) (70/38 - 182/86)  BP(mean): 85 (22 Jan 2024 08:00) (49 - 130  RR: 31 (22 Jan 2024 08:00) (16 - 47)  SpO2: 99% (22 Jan 2024 08:00) (87% - 100%)    O2 Parameters below as of 22 Jan 2024 08:00  Patient On (Oxygen Delivery Method): nasal cannula, high flow  O2 Flow (L/min): 60  O2 Concentration (%): 60        General: ill looking  Lungs: dec bs both bases  Cardiovascular: Regular   Abdomen: Soft, Positive BS  Extremities: No clubbing   not following commands  foot ulcer      01-21-24 @ 07:01  -  01-22-24 @ 07:00  --------------------------------------------------------  IN:    IV PiggyBack: 550 mL    Lactated Ringers: 1440 mL    Norepinephrine: 151 mL    Norepinephrine: 84 mL  Total IN: 2225 mL    OUT:    Indwelling Catheter - Urethral (mL): 2050 mL  Total OUT: 2050 mL    Total NET: 175 mL      01-22-24 @ 07:01  -  01-22-24 @ 08:42  --------------------------------------------------------  IN:    Lactated Ringers: 60 mL    Norepinephrine: 6.4 mL  Total IN: 66.4 mL    OUT:    Indwelling Catheter - Urethral (mL): 100 mL  Total OUT: 100 mL    Total NET: -33.6 mL          LABS:                          8.9    9.53  )-----------( 309      ( 22 Jan 2024 05:25 )             29.1                                               01-22    140  |  100  |  7<L>  ----------------------------<  129<H>  3.2<L>   |  30  |  0.6<L>    Ca    7.9<L>      22 Jan 2024 05:25  Mg     1.8     01-22    TPro  6.1  /  Alb  2.9<L>  /  TBili  0.2  /  DBili  x   /  AST  26  /  ALT  25  /  AlkPhos  88  01-22      PT/INR - ( 20 Jan 2024 16:15 )   PT: 16.40 sec;   INR: 1.43 ratio         PTT - ( 20 Jan 2024 16:15 )  PTT:38.6 sec                                       Urinalysis Basic - ( 22 Jan 2024 05:25 )    Color: x / Appearance: x / SG: x / pH: x  Gluc: 129 mg/dL / Ketone: x  / Bili: x / Urobili: x   Blood: x / Protein: x / Nitrite: x   Leuk Esterase: x / RBC: x / WBC x   Sq Epi: x / Non Sq Epi: x / Bacteria: x                                                  LIVER FUNCTIONS - ( 22 Jan 2024 05:25 )  Alb: 2.9 g/dL / Pro: 6.1 g/dL / ALK PHOS: 88 U/L / ALT: 25 U/L / AST: 26 U/L / GGT: x                                                  Culture - Urine (collected 21 Jan 2024 05:00)  Source: Clean Catch Clean Catch (Midstream)  Final Report (22 Jan 2024 07:15):    No growth    Culture - Blood (collected 20 Jan 2024 16:15)  Source: .Blood Blood-Peripheral  Gram Stain (21 Jan 2024 20:33):    Growth in aerobic bottle: Gram positive cocci in pairs  Preliminary Report (21 Jan 2024 20:33):    Growth in aerobic bottle: Gram positive cocci in pairs    Direct identification is available within approximately 3-5    hours either by Blood Panel Multiplexed PCR or Direct    MALDI-TOF. Details: [email or web address]  Organism: Blood Culture PCR (21 Jan 2024 22:08)  Organism: Blood Culture PCR (21 Jan 2024 22:08)    Culture - Blood (collected 20 Jan 2024 16:15)  Source: .Blood Blood-Peripheral  Preliminary Report (21 Jan 2024 23:02):    No growth at 24 hours                                                                                       ABG - ( 21 Jan 2024 00:47 )  pH, Arterial: 7.45  pH, Blood: x     /  pCO2: 40    /  pO2: 81    / HCO3: 28    / Base Excess: 3.5   /  SaO2: 96.3                MEDICATIONS  (STANDING):  artificial  tears Solution 1 Drop(s) Both EYES two times a day  calcium carbonate   1250 mG (OsCal) 1 Tablet(s) Oral two times a day  chlorhexidine 2% Cloths 1 Application(s) Topical daily  enoxaparin Injectable 50 milliGRAM(s) SubCutaneous every 12 hours  gabapentin 300 milliGRAM(s) Oral every 12 hours  lactated ringers. 1000 milliLiter(s) (60 mL/Hr) IV Continuous <Continuous>  levETIRAcetam  IVPB 500 milliGRAM(s) IV Intermittent every 12 hours  melatonin 3 milliGRAM(s) Oral at bedtime  meropenem  IVPB 1000 milliGRAM(s) IV Intermittent every 8 hours  midodrine. 10 milliGRAM(s) Oral every 8 hours  norepinephrine Infusion 0.05 MICROgram(s)/kG/Min (2.45 mL/Hr) IV Continuous <Continuous>  pantoprazole  Injectable 40 milliGRAM(s) IV Push every 24 hours  polyethylene glycol 3350 17 Gram(s) Oral at bedtime  raloxifene 60 milliGRAM(s) Oral daily  senna 2 Tablet(s) Oral at bedtime  silver sulfADIAZINE 1% Cream 1 Application(s) Topical two times a day  vancomycin  IVPB 500 milliGRAM(s) IV Intermittent every 12 hours    MEDICATIONS  (PRN):  acetaminophen     Tablet .. 650 milliGRAM(s) Oral every 6 hours PRN Temp greater or equal to 38C (100.4F), Mild Pain (1 - 3)  aluminum hydroxide/magnesium hydroxide/simethicone Suspension 30 milliLiter(s) Oral every 4 hours PRN Dyspepsia  ondansetron Injectable 4 milliGRAM(s) IV Push every 8 hours PRN Nausea and/or Vomiting      Xrays:                                                                                     ECHO

## 2024-01-22 NOTE — PHYSICAL THERAPY INITIAL EVALUATION ADULT - PERTINENT HX OF CURRENT PROBLEM, REHAB EVAL
58F w/ PMHx Down Syndrome, nonverbal at baseline, Hypothyroidism, Cerebral palsy and Seizure Disorders presents [facility] the ED [facility] presented to ED for respiratory distress and high grade fever. Patient found to be septic on admission. As per aide Janette at bedside, patient had been coughing and congested for 3x days.

## 2024-01-22 NOTE — PROGRESS NOTE ADULT - ASSESSMENT
IMPRESSION:    Acute hypoxemic respiratory failure on HHFNC 60%  Sepsis POA  Septic shock  RSV  Recurrent aspiration pneumonia/ prior intubation  HO GI bleed  HO OM  HO recent duodenal perforation   HO polymicrobial bacteremia   H/o CP  H/o seizures    PLAN:    CNS:  Avoid CNS Depressant, AED OP    HEENT: Oral care.  ET care     PULMONARY: HOB at 45 degrees.  Aspiration precaution.  keep SAO2 92 TO 96%    CARDIOVASCULAR: Goal directed fluid resuscitation.  Taper pressors    GI: Protonix, NGT,, DC IVF    RENAL:    FU lytes.  Correct as needed.      INFECTIOUS DISEASE:  ABX, pancx, ABX PER ID    HEMATOLOGICAL: DVT prophylaxis se.    ENDOCRINE:  Follow up FS.  Insulin protocol if needed.    MUSCULOSKELETAL: Bedrest.  Off loading.  Wound care.      Prognosis very poor    MICU   R IJ 1/21

## 2024-01-22 NOTE — PROGRESS NOTE ADULT - SUBJECTIVE AND OBJECTIVE BOX
24H events:    Patient is a 58y old Female who presents with a chief complaint of Respiratory Distress (22 Jan 2024 08:41)    Primary diagnosis of Sepsis with acute hypoxic respiratory failure      Today is hospital day 2d. This morning patient was seen and examined at bedside, resting comfortably in bed.    No acute or major events overnight.    Code Status:    Family communication:  Contact date:  Name of person contacted:  Relationship to patient:  Communication details:  What matters most:    PAST MEDICAL & SURGICAL HISTORY  Down syndrome    Osteoporosis    Mild anemia    Neuropathy    S/P debridement  of R hip on 3/2/21      SOCIAL HISTORY:  Social History:      ALLERGIES:  No Known Allergies    MEDICATIONS:  STANDING MEDICATIONS  artificial  tears Solution 1 Drop(s) Both EYES two times a day  calcium carbonate   1250 mG (OsCal) 1 Tablet(s) Oral two times a day  chlorhexidine 2% Cloths 1 Application(s) Topical daily  enoxaparin Injectable 50 milliGRAM(s) SubCutaneous every 12 hours  gabapentin 300 milliGRAM(s) Oral every 12 hours  lactated ringers. 1000 milliLiter(s) IV Continuous <Continuous>  levETIRAcetam  IVPB 500 milliGRAM(s) IV Intermittent every 12 hours  melatonin 3 milliGRAM(s) Oral at bedtime  meropenem  IVPB 1000 milliGRAM(s) IV Intermittent every 8 hours  midodrine. 10 milliGRAM(s) Oral every 8 hours  norepinephrine Infusion 0.05 MICROgram(s)/kG/Min IV Continuous <Continuous>  pantoprazole  Injectable 40 milliGRAM(s) IV Push every 24 hours  polyethylene glycol 3350 17 Gram(s) Oral at bedtime  potassium chloride  20 mEq/100 mL IVPB 20 milliEquivalent(s) IV Intermittent every 2 hours  raloxifene 60 milliGRAM(s) Oral daily  senna 2 Tablet(s) Oral at bedtime  silver sulfADIAZINE 1% Cream 1 Application(s) Topical two times a day  vancomycin  IVPB 500 milliGRAM(s) IV Intermittent every 12 hours    PRN MEDICATIONS  acetaminophen     Tablet .. 650 milliGRAM(s) Oral every 6 hours PRN  aluminum hydroxide/magnesium hydroxide/simethicone Suspension 30 milliLiter(s) Oral every 4 hours PRN  ondansetron Injectable 4 milliGRAM(s) IV Push every 8 hours PRN    VITALS:   T(F): 101.1  HR: 94  BP: 113/56  RR: 22  SpO2: 98%    PHYSICAL EXAM:  General: ill looking  Lungs: dec bs both bases  Cardiovascular: Regular   Abdomen: Soft, Positive BS  Extremities: No clubbing   not following commands  foot ulcer    LABS:                        8.9    9.53  )-----------( 309      ( 22 Jan 2024 05:25 )             29.1     01-22    140  |  100  |  7<L>  ----------------------------<  129<H>  3.2<L>   |  30  |  0.6<L>    Ca    7.9<L>      22 Jan 2024 05:25  Mg     1.8     01-22    TPro  6.1  /  Alb  2.9<L>  /  TBili  0.2  /  DBili  x   /  AST  26  /  ALT  25  /  AlkPhos  88  01-22    PT/INR - ( 20 Jan 2024 16:15 )   PT: 16.40 sec;   INR: 1.43 ratio         PTT - ( 20 Jan 2024 16:15 )  PTT:38.6 sec  Urinalysis Basic - ( 22 Jan 2024 05:25 )    Color: x / Appearance: x / SG: x / pH: x  Gluc: 129 mg/dL / Ketone: x  / Bili: x / Urobili: x   Blood: x / Protein: x / Nitrite: x   Leuk Esterase: x / RBC: x / WBC x   Sq Epi: x / Non Sq Epi: x / Bacteria: x      ABG - ( 21 Jan 2024 00:47 )  pH, Arterial: 7.45  pH, Blood: x     /  pCO2: 40    /  pO2: 81    / HCO3: 28    / Base Excess: 3.5   /  SaO2: 96.3                  Culture - Urine (collected 21 Jan 2024 05:00)  Source: Clean Catch Clean Catch (Midstream)  Final Report (22 Jan 2024 07:15):    No growth    Culture - Blood (collected 20 Jan 2024 16:15)  Source: .Blood Blood-Peripheral  Gram Stain (21 Jan 2024 20:33):    Growth in aerobic bottle: Gram positive cocci in pairs  Preliminary Report (21 Jan 2024 20:33):    Growth in aerobic bottle: Gram positive cocci in pairs    Direct identification is available within approximately 3-5    hours either by Blood Panel Multiplexed PCR or Direct    MALDI-TOF. Details: [email or web address]  Organism: Blood Culture PCR (21 Jan 2024 22:08)  Organism: Blood Culture PCR (21 Jan 2024 22:08)    Culture - Blood (collected 20 Jan 2024 16:15)  Source: .Blood Blood-Peripheral  Preliminary Report (21 Jan 2024 23:02):    No growth at 24 hours          RADIOLOGY:    · Assessment	  58F w/ PMHx Down Syndrome, nonverbal at baseline, DVT on eliquis 5mg BID,  Hypothyroidism, aspiration pneumonia in prev admissions, Cerebral palsy and Seizure Disorders presents to the ED from nursing facility/group home ([facility]) presented to ED for respiratory distress and high grade fever. Patient found to be septic on admission. Admitted for management of acute respiratory distress likely 2/2 CAP or aspiration PNA.    #Sepsis POA(fever, tachy and leukocytosis)  #Acute hypoxic resp failure 2/2 Aspiration PNA or  RSV bronchiolitis  #h/o Dysphagia - Puree Diet at baseline  - Vitals on admission: Temp 104.5F (rectal), /74, , RR 24, SpO2 100% on BiPAP  - VBG Lactate 2.1, pH wnl and CO2 mildly elevated 61  - previously fungitell +ve s/p Caspofungin on previous admission  - CXR shows- stable b/l pul opacities  - s/p Cefepime 2g IV x1, Levaquin 750mg IV x1, Zosyn 3.375mgv x 1   - c/w meropenem 1g q8 as per ID recs  - To d/c vanc if MRSA nares neg  - s/p 2L LR bolus  - Lactate-1.2, procal 0.04   - f/u BCx, UCx- pending  - RVP- RSV detected,   - MRSA swab, urine strep, urine legionella, fungitell pending  - d-dimer 605 and LE duplex pending  - aspiration precautions  - NG tube placement and start on oral fluids    #Elevated Troponin (stable at 35)  - Trop 25>37>35>35  - EKG shows tachycardia, p waves present, likely sinus tach w/ a lot of artifact from shaking    #Acute on Chronic Normocytic Anemia (stable)  - Hgb 8.9 (previously 9.5)  - INR 1.43  - no evidence of hemorrhage  - f/u b12, folate, iron studies- pending  - monitor H&H  - transfuse PRN, keep hgb >7    #h/o DVT of L Common Femoral Vein  - on Eliquis 5mg BID at home  - will switch to Lovenox for now while patient is on BiPAP - resume Eliquis if patient can tolerate PO  - started patient on Lovenox 60mg BID for Full AC based on weight of previous admission - change to new weight when available  - f/u LE duplex     #Seizure Disorder  - on PO Keprra 500mg BID  - will switch to IV Keppra for now while patient is on BiPAP - resume PO Keppra when patient can tolerate PO  - f/u Keppra level    #Hypothyroidism  - not on any medications at home  - f/u TSH    #h/o Right Foot OM (1st toe stump and 2nd distal phalanx)  #h/o Multiple Left Foot deep tissue injuries  #h/o Sacral Wound  - Patient underwent excisional debridement of ulcer of right foot (including 1st metatarsal) and partial amputation 2nd toe on previous admissions  - past wound cultures grew Proteus and ESBL E coli  - reposition q2hrs to prevent pressure injury  - local wound care  - no acute surgical interventions from podiatry and burn    #Down Syndrome  #Cerebral Palsy  #Non-Verbal    #DVT ppx: on Eliquis 5mg PO BID - Lovenox Full AC for now - Resume Eliquis when no longer on BiPAP  #GI ppx: Protonix 40mg IV daily - switch to PO when patient can tolerate PO  #Diet: NPO for now, Puree diet at baseline - on BiPAP - f/u S&S c/s  #Activity: IAT - PT consult  #Code Status: Full Code  #Dispo: from Group Home ([facility]), SDU for now, acute           24H events:    Patient is a 58y old Female who presents with a chief complaint of Respiratory Distress (22 Jan 2024 08:41)    Primary diagnosis of Sepsis with acute hypoxic respiratory failure      Today is hospital day 2d. This morning patient was seen and examined at bedside, resting comfortably in bed.    No acute or major events overnight.    Code Status:    Family communication:  Contact date:  Name of person contacted:  Relationship to patient:  Communication details:  What matters most:    PAST MEDICAL & SURGICAL HISTORY  Down syndrome    Osteoporosis    Mild anemia    Neuropathy    S/P debridement  of R hip on 3/2/21      SOCIAL HISTORY:  Social History:      ALLERGIES:  No Known Allergies    MEDICATIONS:  STANDING MEDICATIONS  artificial  tears Solution 1 Drop(s) Both EYES two times a day  calcium carbonate   1250 mG (OsCal) 1 Tablet(s) Oral two times a day  chlorhexidine 2% Cloths 1 Application(s) Topical daily  enoxaparin Injectable 50 milliGRAM(s) SubCutaneous every 12 hours  gabapentin 300 milliGRAM(s) Oral every 12 hours  lactated ringers. 1000 milliLiter(s) IV Continuous <Continuous>  levETIRAcetam  IVPB 500 milliGRAM(s) IV Intermittent every 12 hours  melatonin 3 milliGRAM(s) Oral at bedtime  meropenem  IVPB 1000 milliGRAM(s) IV Intermittent every 8 hours  midodrine. 10 milliGRAM(s) Oral every 8 hours  norepinephrine Infusion 0.05 MICROgram(s)/kG/Min IV Continuous <Continuous>  pantoprazole  Injectable 40 milliGRAM(s) IV Push every 24 hours  polyethylene glycol 3350 17 Gram(s) Oral at bedtime  potassium chloride  20 mEq/100 mL IVPB 20 milliEquivalent(s) IV Intermittent every 2 hours  raloxifene 60 milliGRAM(s) Oral daily  senna 2 Tablet(s) Oral at bedtime  silver sulfADIAZINE 1% Cream 1 Application(s) Topical two times a day  vancomycin  IVPB 500 milliGRAM(s) IV Intermittent every 12 hours    PRN MEDICATIONS  acetaminophen     Tablet .. 650 milliGRAM(s) Oral every 6 hours PRN  aluminum hydroxide/magnesium hydroxide/simethicone Suspension 30 milliLiter(s) Oral every 4 hours PRN  ondansetron Injectable 4 milliGRAM(s) IV Push every 8 hours PRN    VITALS:   T(F): 101.1  HR: 94  BP: 113/56  RR: 22  SpO2: 98%    PHYSICAL EXAM:  General: ill looking  Lungs: dec bs both bases  Cardiovascular: Regular   Abdomen: Soft, Positive BS  Extremities: No clubbing   not following commands  foot ulcer    LABS:                        8.9    9.53  )-----------( 309      ( 22 Jan 2024 05:25 )             29.1     01-22    140  |  100  |  7<L>  ----------------------------<  129<H>  3.2<L>   |  30  |  0.6<L>    Ca    7.9<L>      22 Jan 2024 05:25  Mg     1.8     01-22    TPro  6.1  /  Alb  2.9<L>  /  TBili  0.2  /  DBili  x   /  AST  26  /  ALT  25  /  AlkPhos  88  01-22    PT/INR - ( 20 Jan 2024 16:15 )   PT: 16.40 sec;   INR: 1.43 ratio         PTT - ( 20 Jan 2024 16:15 )  PTT:38.6 sec  Urinalysis Basic - ( 22 Jan 2024 05:25 )    Color: x / Appearance: x / SG: x / pH: x  Gluc: 129 mg/dL / Ketone: x  / Bili: x / Urobili: x   Blood: x / Protein: x / Nitrite: x   Leuk Esterase: x / RBC: x / WBC x   Sq Epi: x / Non Sq Epi: x / Bacteria: x      ABG - ( 21 Jan 2024 00:47 )  pH, Arterial: 7.45  pH, Blood: x     /  pCO2: 40    /  pO2: 81    / HCO3: 28    / Base Excess: 3.5   /  SaO2: 96.3                  Culture - Urine (collected 21 Jan 2024 05:00)  Source: Clean Catch Clean Catch (Midstream)  Final Report (22 Jan 2024 07:15):    No growth    Culture - Blood (collected 20 Jan 2024 16:15)  Source: .Blood Blood-Peripheral  Gram Stain (21 Jan 2024 20:33):    Growth in aerobic bottle: Gram positive cocci in pairs  Preliminary Report (21 Jan 2024 20:33):    Growth in aerobic bottle: Gram positive cocci in pairs    Direct identification is available within approximately 3-5    hours either by Blood Panel Multiplexed PCR or Direct    MALDI-TOF. Details: [email or web address]  Organism: Blood Culture PCR (21 Jan 2024 22:08)  Organism: Blood Culture PCR (21 Jan 2024 22:08)    Culture - Blood (collected 20 Jan 2024 16:15)  Source: .Blood Blood-Peripheral  Preliminary Report (21 Jan 2024 23:02):    No growth at 24 hours          RADIOLOGY:    · Assessment	  58F w/ PMHx Down Syndrome, nonverbal at baseline, DVT on eliquis 5mg BID,  Hypothyroidism, aspiration pneumonia in prev admissions, Cerebral palsy and Seizure Disorders presents to the ED from nursing facility/group home ([facility]) presented to ED for respiratory distress and high grade fever. Patient found to be septic on admission. Admitted for management of acute respiratory distress likely 2/2 CAP or aspiration PNA.    #Sepsis POA(fever, tachy and leukocytosis)  #Acute hypoxic resp failure 2/2 Aspiration PNA or  RSV bronchiolitis  #h/o Dysphagia - Puree Diet at baseline  - Vitals on admission: Temp 104.5F (rectal), /74, , RR 24, SpO2 100% on BiPAP  - VBG Lactate 2.1, pH wnl and CO2 mildly elevated 61  - s/p 2L LR bolus  - previously fungitell +ve s/p Caspofungin on previous admission  - CXR shows- stable b/l pul opacities  - s/p Cefepime 2g IV x1, Levaquin 750mg IV x1, Zosyn 3.375mgv x 1   - c/w meropenem 1g q8 as per ID recs  - MRSA neg - vanc dcd(1/22)  - Lactate-1.2, procal 0.04   - f/u BCx, UCx- pending  - RVP- RSV detected,   - MRSA swab, urine strep, urine legionella, fungitell pending  - d-dimer 605 and LE duplex pending  - aspiration precautions  - NG tube placement and start on oral fluids    #Elevated Troponin (stable at 35)  - Trop 25>37>35>35  - EKG shows tachycardia, p waves present, likely sinus tach w/ a lot of artifact from shaking    #Acute on Chronic Normocytic Anemia (stable)  - Hgb 8.9 (previously 9.5)  - INR 1.43  - no evidence of hemorrhage  - f/u b12, folate, iron studies- pending  - monitor H&H  - transfuse PRN, keep hgb >7    #h/o DVT of L Common Femoral Vein  - on Eliquis 5mg BID at home  - will switch to Lovenox for now while patient is on BiPAP - resume Eliquis if patient can tolerate PO  - started patient on Lovenox 60mg BID for Full AC based on weight of previous admission - change to new weight when available  - f/u LE duplex     #Seizure Disorder  - on PO Keprra 500mg BID  - will switch to IV Keppra for now while patient is on BiPAP - resume PO Keppra when patient can tolerate PO  - f/u Keppra level    #Hypothyroidism  - not on any medications at home  - f/u TSH    #h/o Right Foot OM (1st toe stump and 2nd distal phalanx)  #h/o Multiple Left Foot deep tissue injuries  #h/o Sacral Wound  - Patient underwent excisional debridement of ulcer of right foot (including 1st metatarsal) and partial amputation 2nd toe on previous admissions  - past wound cultures grew Proteus and ESBL E coli  - reposition q2hrs to prevent pressure injury  - local wound care  - no acute surgical interventions from podiatry and burn    #Down Syndrome  #Cerebral Palsy  #Non-Verbal    #DVT ppx: on Eliquis 5mg PO BID - Lovenox Full AC for now - Resume Eliquis when no longer on BiPAP  #GI ppx: Protonix 40mg IV daily - switch to PO when patient can tolerate PO  #Diet: NPO for now, Puree diet at baseline - on BiPAP - f/u S&S c/s  #Activity: IAT - PT consult  #Code Status: Full Code  #Dispo: from Group Home ([facility]), SDU for now, acute

## 2024-01-23 LAB
ALBUMIN SERPL ELPH-MCNC: 2.8 G/DL — LOW (ref 3.5–5.2)
ALP SERPL-CCNC: 75 U/L — SIGNIFICANT CHANGE UP (ref 30–115)
ALT FLD-CCNC: 20 U/L — SIGNIFICANT CHANGE UP (ref 0–41)
ANION GAP SERPL CALC-SCNC: 10 MMOL/L — SIGNIFICANT CHANGE UP (ref 7–14)
AST SERPL-CCNC: 28 U/L — SIGNIFICANT CHANGE UP (ref 0–41)
BASOPHILS # BLD AUTO: 0.01 K/UL — SIGNIFICANT CHANGE UP (ref 0–0.2)
BASOPHILS NFR BLD AUTO: 0.2 % — SIGNIFICANT CHANGE UP (ref 0–1)
BILIRUB SERPL-MCNC: <0.2 MG/DL — SIGNIFICANT CHANGE UP (ref 0.2–1.2)
BUN SERPL-MCNC: 7 MG/DL — LOW (ref 10–20)
CALCIUM SERPL-MCNC: 8.1 MG/DL — LOW (ref 8.4–10.5)
CHLORIDE SERPL-SCNC: 99 MMOL/L — SIGNIFICANT CHANGE UP (ref 98–110)
CO2 SERPL-SCNC: 29 MMOL/L — SIGNIFICANT CHANGE UP (ref 17–32)
CREAT SERPL-MCNC: 0.6 MG/DL — LOW (ref 0.7–1.5)
EGFR: 105 ML/MIN/1.73M2 — SIGNIFICANT CHANGE UP
EOSINOPHIL # BLD AUTO: 0 K/UL — SIGNIFICANT CHANGE UP (ref 0–0.7)
EOSINOPHIL NFR BLD AUTO: 0 % — SIGNIFICANT CHANGE UP (ref 0–8)
GLUCOSE SERPL-MCNC: 73 MG/DL — SIGNIFICANT CHANGE UP (ref 70–99)
HCT VFR BLD CALC: 28.5 % — LOW (ref 37–47)
HGB BLD-MCNC: 8.8 G/DL — LOW (ref 12–16)
IMM GRANULOCYTES NFR BLD AUTO: 0.5 % — HIGH (ref 0.1–0.3)
LEGIONELLA AG UR QL: NEGATIVE — SIGNIFICANT CHANGE UP
LYMPHOCYTES # BLD AUTO: 0.79 K/UL — LOW (ref 1.2–3.4)
LYMPHOCYTES # BLD AUTO: 13.6 % — LOW (ref 20.5–51.1)
MCHC RBC-ENTMCNC: 24.9 PG — LOW (ref 27–31)
MCHC RBC-ENTMCNC: 30.9 G/DL — LOW (ref 32–37)
MCV RBC AUTO: 80.7 FL — LOW (ref 81–99)
MONOCYTES # BLD AUTO: 0.23 K/UL — SIGNIFICANT CHANGE UP (ref 0.1–0.6)
MONOCYTES NFR BLD AUTO: 4 % — SIGNIFICANT CHANGE UP (ref 1.7–9.3)
NEUTROPHILS # BLD AUTO: 4.75 K/UL — SIGNIFICANT CHANGE UP (ref 1.4–6.5)
NEUTROPHILS NFR BLD AUTO: 81.7 % — HIGH (ref 42.2–75.2)
NRBC # BLD: 0 /100 WBCS — SIGNIFICANT CHANGE UP (ref 0–0)
PLATELET # BLD AUTO: 273 K/UL — SIGNIFICANT CHANGE UP (ref 130–400)
PMV BLD: 10.2 FL — SIGNIFICANT CHANGE UP (ref 7.4–10.4)
POTASSIUM SERPL-MCNC: 3.9 MMOL/L — SIGNIFICANT CHANGE UP (ref 3.5–5)
POTASSIUM SERPL-SCNC: 3.9 MMOL/L — SIGNIFICANT CHANGE UP (ref 3.5–5)
PROT SERPL-MCNC: 5.8 G/DL — LOW (ref 6–8)
RBC # BLD: 3.53 M/UL — LOW (ref 4.2–5.4)
RBC # FLD: 18.5 % — HIGH (ref 11.5–14.5)
SODIUM SERPL-SCNC: 138 MMOL/L — SIGNIFICANT CHANGE UP (ref 135–146)
WBC # BLD: 5.81 K/UL — SIGNIFICANT CHANGE UP (ref 4.8–10.8)
WBC # FLD AUTO: 5.81 K/UL — SIGNIFICANT CHANGE UP (ref 4.8–10.8)

## 2024-01-23 PROCEDURE — 99233 SBSQ HOSP IP/OBS HIGH 50: CPT

## 2024-01-23 PROCEDURE — 71045 X-RAY EXAM CHEST 1 VIEW: CPT | Mod: 26

## 2024-01-23 RX ADMIN — Medication 1 DROP(S): at 05:35

## 2024-01-23 RX ADMIN — MEROPENEM 100 MILLIGRAM(S): 1 INJECTION INTRAVENOUS at 13:46

## 2024-01-23 RX ADMIN — Medication 3 MILLIGRAM(S): at 22:37

## 2024-01-23 RX ADMIN — MIDODRINE HYDROCHLORIDE 10 MILLIGRAM(S): 2.5 TABLET ORAL at 13:46

## 2024-01-23 RX ADMIN — Medication 1 APPLICATION(S): at 17:15

## 2024-01-23 RX ADMIN — GABAPENTIN 300 MILLIGRAM(S): 400 CAPSULE ORAL at 17:14

## 2024-01-23 RX ADMIN — MEROPENEM 100 MILLIGRAM(S): 1 INJECTION INTRAVENOUS at 22:37

## 2024-01-23 RX ADMIN — Medication 650 MILLIGRAM(S): at 00:06

## 2024-01-23 RX ADMIN — Medication 1 APPLICATION(S): at 05:34

## 2024-01-23 RX ADMIN — PANTOPRAZOLE SODIUM 40 MILLIGRAM(S): 20 TABLET, DELAYED RELEASE ORAL at 05:35

## 2024-01-23 RX ADMIN — ENOXAPARIN SODIUM 50 MILLIGRAM(S): 100 INJECTION SUBCUTANEOUS at 17:15

## 2024-01-23 RX ADMIN — MEROPENEM 100 MILLIGRAM(S): 1 INJECTION INTRAVENOUS at 05:36

## 2024-01-23 RX ADMIN — LEVETIRACETAM 500 MILLIGRAM(S): 250 TABLET, FILM COATED ORAL at 17:15

## 2024-01-23 RX ADMIN — LEVETIRACETAM 500 MILLIGRAM(S): 250 TABLET, FILM COATED ORAL at 05:35

## 2024-01-23 RX ADMIN — Medication 650 MILLIGRAM(S): at 13:46

## 2024-01-23 RX ADMIN — CHLORHEXIDINE GLUCONATE 1 APPLICATION(S): 213 SOLUTION TOPICAL at 13:48

## 2024-01-23 RX ADMIN — Medication 650 MILLIGRAM(S): at 14:15

## 2024-01-23 RX ADMIN — RALOXIFENE HYDROCHLORIDE 60 MILLIGRAM(S): 60 TABLET, COATED ORAL at 13:46

## 2024-01-23 RX ADMIN — Medication 1 TABLET(S): at 05:35

## 2024-01-23 RX ADMIN — Medication 650 MILLIGRAM(S): at 00:36

## 2024-01-23 RX ADMIN — Medication 1 DROP(S): at 17:14

## 2024-01-23 RX ADMIN — MIDODRINE HYDROCHLORIDE 10 MILLIGRAM(S): 2.5 TABLET ORAL at 22:37

## 2024-01-23 RX ADMIN — MIDODRINE HYDROCHLORIDE 10 MILLIGRAM(S): 2.5 TABLET ORAL at 05:35

## 2024-01-23 RX ADMIN — ENOXAPARIN SODIUM 50 MILLIGRAM(S): 100 INJECTION SUBCUTANEOUS at 05:36

## 2024-01-23 RX ADMIN — Medication 1 TABLET(S): at 17:16

## 2024-01-23 RX ADMIN — GABAPENTIN 300 MILLIGRAM(S): 400 CAPSULE ORAL at 05:35

## 2024-01-23 NOTE — PROGRESS NOTE ADULT - SUBJECTIVE AND OBJECTIVE BOX
Over Night Events: events noted, remain critically ill, fever    PHYSICAL EXAM    ICU Vital Signs Last 24 Hrs  T(C): 37.4 (23 Jan 2024 06:00), Max: 39.2 (22 Jan 2024 12:00)  T(F): 99.3 (23 Jan 2024 06:00), Max: 102.6 (22 Jan 2024 12:00)  HR: 97 (23 Jan 2024 06:30) (75 - 128)  BP: 118/70 (23 Jan 2024 06:30) (83/61 - 158/76)  BP(mean): 85 (23 Jan 2024 06:30) (62 - 136)  RR: 27 (23 Jan 2024 06:30) (14 - 44)  SpO2: 96% (23 Jan 2024 06:30) (89% - 100%)    O2 Parameters below as of 23 Jan 2024 06:00  Patient On (Oxygen Delivery Method): nasal cannula  O2 Flow (L/min): 4          General: ill looking  Lungs: Bilateral rhonchi  Cardiovascular: se 2.6  Abdomen: Soft, Positive BS  Extremities: No clubbing   Neurological: Non focal  foot ulcer       01-22-24 @ 07:01  -  01-23-24 @ 07:00  --------------------------------------------------------  IN:    IV PiggyBack: 450 mL    Lactated Ringers: 1320 mL    Norepinephrine: 42.8 mL  Total IN: 1812.8 mL    OUT:    Indwelling Catheter - Urethral (mL): 1850 mL  Total OUT: 1850 mL    Total NET: -37.2 mL          LABS:                          8.8    5.81  )-----------( 273      ( 23 Jan 2024 05:15 )             28.5                                               01-23    138  |  99  |  7<L>  ----------------------------<  73  3.9   |  29  |  0.6<L>    Ca    8.1<L>      23 Jan 2024 05:15  Mg     1.8     01-22    TPro  5.8<L>  /  Alb  2.8<L>  /  TBili  <0.2  /  DBili  x   /  AST  28  /  ALT  20  /  AlkPhos  75  01-23                                             Urinalysis Basic - ( 23 Jan 2024 05:15 )    Color: x / Appearance: x / SG: x / pH: x  Gluc: 73 mg/dL / Ketone: x  / Bili: x / Urobili: x   Blood: x / Protein: x / Nitrite: x   Leuk Esterase: x / RBC: x / WBC x   Sq Epi: x / Non Sq Epi: x / Bacteria: x                                                  LIVER FUNCTIONS - ( 23 Jan 2024 05:15 )  Alb: 2.8 g/dL / Pro: 5.8 g/dL / ALK PHOS: 75 U/L / ALT: 20 U/L / AST: 28 U/L / GGT: x                                                  Culture - Urine (collected 21 Jan 2024 05:00)  Source: Clean Catch Clean Catch (Midstream)  Final Report (22 Jan 2024 07:15):    No growth    Culture - Blood (collected 20 Jan 2024 16:15)  Source: .Blood Blood-Peripheral  Gram Stain (21 Jan 2024 20:33):    Growth in aerobic bottle: Gram positive cocci in pairs  Final Report (22 Jan 2024 19:04):    Growth in aerobic bottle: Staphylococcus hominis    Isolation of Coagulase negative Staphylococcus from single blood culture    sets may represent    contamination. Contact the Microbiology Department at [phone] if    susceptibility testing is    clinically indicated.    Direct identification is available within approximately 3-5    hours either by Blood Panel Multiplexed PCR or Direct    MALDI-TOF. Details: [email or web address]  Organism: Blood Culture PCR (22 Jan 2024 19:04)  Organism: Blood Culture PCR (22 Jan 2024 19:04)    Culture - Blood (collected 20 Jan 2024 16:15)  Source: .Blood Blood-Peripheral  Preliminary Report (22 Jan 2024 23:01):    No growth at 48 Hours                                                                                           MEDICATIONS  (STANDING):  artificial  tears Solution 1 Drop(s) Both EYES two times a day  calcium carbonate   1250 mG (OsCal) 1 Tablet(s) Oral two times a day  chlorhexidine 2% Cloths 1 Application(s) Topical daily  enoxaparin Injectable 50 milliGRAM(s) SubCutaneous every 12 hours  gabapentin 300 milliGRAM(s) Oral every 12 hours  lactated ringers. 1000 milliLiter(s) (60 mL/Hr) IV Continuous <Continuous>  levETIRAcetam  Solution 500 milliGRAM(s) Oral every 12 hours  melatonin 3 milliGRAM(s) Oral at bedtime  meropenem  IVPB 1000 milliGRAM(s) IV Intermittent every 8 hours  midodrine. 10 milliGRAM(s) Oral every 8 hours  norepinephrine Infusion 0.05 MICROgram(s)/kG/Min (2.45 mL/Hr) IV Continuous <Continuous>  pantoprazole  Injectable 40 milliGRAM(s) IV Push every 24 hours  polyethylene glycol 3350 17 Gram(s) Oral at bedtime  raloxifene 60 milliGRAM(s) Oral daily  senna 2 Tablet(s) Oral at bedtime  silver sulfADIAZINE 1% Cream 1 Application(s) Topical two times a day    MEDICATIONS  (PRN):  acetaminophen     Tablet .. 650 milliGRAM(s) Oral every 6 hours PRN Temp greater or equal to 38C (100.4F), Mild Pain (1 - 3)  aluminum hydroxide/magnesium hydroxide/simethicone Suspension 30 milliLiter(s) Oral every 4 hours PRN Dyspepsia  ondansetron Injectable 4 milliGRAM(s) IV Push every 8 hours PRN Nausea and/or Vomiting      cxr noted     Over Night Events: events noted, remain critically ill, fever, off levophed    PHYSICAL EXAM    ICU Vital Signs Last 24 Hrs  T(C): 37.4 (23 Jan 2024 06:00), Max: 39.2 (22 Jan 2024 12:00)  T(F): 99.3 (23 Jan 2024 06:00), Max: 102.6 (22 Jan 2024 12:00)  HR: 97 (23 Jan 2024 06:30) (75 - 128)  BP: 118/70 (23 Jan 2024 06:30) (83/61 - 158/76)  BP(mean): 85 (23 Jan 2024 06:30) (62 - 136)  RR: 27 (23 Jan 2024 06:30) (14 - 44)  SpO2: 96% (23 Jan 2024 06:30) (89% - 100%)    O2 Parameters below as of 23 Jan 2024 06:00  Patient On (Oxygen Delivery Method): nasal cannula  O2 Flow (L/min): 4          General: ill looking  Lungs: Bilateral rhonchi  Cardiovascular: se 2.6  Abdomen: Soft, Positive BS  Extremities: No clubbing   Neurological: Non focal  foot ulcer       01-22-24 @ 07:01  -  01-23-24 @ 07:00  --------------------------------------------------------  IN:    IV PiggyBack: 450 mL    Lactated Ringers: 1320 mL    Norepinephrine: 42.8 mL  Total IN: 1812.8 mL    OUT:    Indwelling Catheter - Urethral (mL): 1850 mL  Total OUT: 1850 mL    Total NET: -37.2 mL          LABS:                          8.8    5.81  )-----------( 273      ( 23 Jan 2024 05:15 )             28.5                                               01-23    138  |  99  |  7<L>  ----------------------------<  73  3.9   |  29  |  0.6<L>    Ca    8.1<L>      23 Jan 2024 05:15  Mg     1.8     01-22    TPro  5.8<L>  /  Alb  2.8<L>  /  TBili  <0.2  /  DBili  x   /  AST  28  /  ALT  20  /  AlkPhos  75  01-23                                             Urinalysis Basic - ( 23 Jan 2024 05:15 )    Color: x / Appearance: x / SG: x / pH: x  Gluc: 73 mg/dL / Ketone: x  / Bili: x / Urobili: x   Blood: x / Protein: x / Nitrite: x   Leuk Esterase: x / RBC: x / WBC x   Sq Epi: x / Non Sq Epi: x / Bacteria: x                                                  LIVER FUNCTIONS - ( 23 Jan 2024 05:15 )  Alb: 2.8 g/dL / Pro: 5.8 g/dL / ALK PHOS: 75 U/L / ALT: 20 U/L / AST: 28 U/L / GGT: x                                                  Culture - Urine (collected 21 Jan 2024 05:00)  Source: Clean Catch Clean Catch (Midstream)  Final Report (22 Jan 2024 07:15):    No growth    Culture - Blood (collected 20 Jan 2024 16:15)  Source: .Blood Blood-Peripheral  Gram Stain (21 Jan 2024 20:33):    Growth in aerobic bottle: Gram positive cocci in pairs  Final Report (22 Jan 2024 19:04):    Growth in aerobic bottle: Staphylococcus hominis    Isolation of Coagulase negative Staphylococcus from single blood culture    sets may represent    contamination. Contact the Microbiology Department at [phone] if    susceptibility testing is    clinically indicated.    Direct identification is available within approximately 3-5    hours either by Blood Panel Multiplexed PCR or Direct    MALDI-TOF. Details: [email or web address]  Organism: Blood Culture PCR (22 Jan 2024 19:04)  Organism: Blood Culture PCR (22 Jan 2024 19:04)    Culture - Blood (collected 20 Jan 2024 16:15)  Source: .Blood Blood-Peripheral  Preliminary Report (22 Jan 2024 23:01):    No growth at 48 Hours                                                                                           MEDICATIONS  (STANDING):  artificial  tears Solution 1 Drop(s) Both EYES two times a day  calcium carbonate   1250 mG (OsCal) 1 Tablet(s) Oral two times a day  chlorhexidine 2% Cloths 1 Application(s) Topical daily  enoxaparin Injectable 50 milliGRAM(s) SubCutaneous every 12 hours  gabapentin 300 milliGRAM(s) Oral every 12 hours  lactated ringers. 1000 milliLiter(s) (60 mL/Hr) IV Continuous <Continuous>  levETIRAcetam  Solution 500 milliGRAM(s) Oral every 12 hours  melatonin 3 milliGRAM(s) Oral at bedtime  meropenem  IVPB 1000 milliGRAM(s) IV Intermittent every 8 hours  midodrine. 10 milliGRAM(s) Oral every 8 hours  norepinephrine Infusion 0.05 MICROgram(s)/kG/Min (2.45 mL/Hr) IV Continuous <Continuous>  pantoprazole  Injectable 40 milliGRAM(s) IV Push every 24 hours  polyethylene glycol 3350 17 Gram(s) Oral at bedtime  raloxifene 60 milliGRAM(s) Oral daily  senna 2 Tablet(s) Oral at bedtime  silver sulfADIAZINE 1% Cream 1 Application(s) Topical two times a day    MEDICATIONS  (PRN):  acetaminophen     Tablet .. 650 milliGRAM(s) Oral every 6 hours PRN Temp greater or equal to 38C (100.4F), Mild Pain (1 - 3)  aluminum hydroxide/magnesium hydroxide/simethicone Suspension 30 milliLiter(s) Oral every 4 hours PRN Dyspepsia  ondansetron Injectable 4 milliGRAM(s) IV Push every 8 hours PRN Nausea and/or Vomiting      cxr noted

## 2024-01-23 NOTE — PROGRESS NOTE ADULT - SUBJECTIVE AND OBJECTIVE BOX
24H events:    Patient is a 58y old Female who presents with a chief complaint of Respiratory Distress (23 Jan 2024 07:00)    Primary diagnosis of Sepsis with acute hypoxic respiratory failure      Code Status:    Family communication:  Contact date:  Name of person contacted:  Relationship to patient:  Communication details:  What matters most:    PAST MEDICAL & SURGICAL HISTORY  Down syndrome    Osteoporosis    Mild anemia    Neuropathy    S/P debridement  of R hip on 3/2/21      SOCIAL HISTORY:  Social History:      ALLERGIES:  No Known Allergies    MEDICATIONS:  STANDING MEDICATIONS  artificial  tears Solution 1 Drop(s) Both EYES two times a day  calcium carbonate   1250 mG (OsCal) 1 Tablet(s) Oral two times a day  chlorhexidine 2% Cloths 1 Application(s) Topical daily  enoxaparin Injectable 50 milliGRAM(s) SubCutaneous every 12 hours  gabapentin 300 milliGRAM(s) Oral every 12 hours  levETIRAcetam  Solution 500 milliGRAM(s) Oral every 12 hours  melatonin 3 milliGRAM(s) Oral at bedtime  meropenem  IVPB 1000 milliGRAM(s) IV Intermittent every 8 hours  midodrine. 10 milliGRAM(s) Oral every 8 hours  norepinephrine Infusion 0.05 MICROgram(s)/kG/Min IV Continuous <Continuous>  pantoprazole  Injectable 40 milliGRAM(s) IV Push every 24 hours  polyethylene glycol 3350 17 Gram(s) Oral at bedtime  raloxifene 60 milliGRAM(s) Oral daily  senna 2 Tablet(s) Oral at bedtime  silver sulfADIAZINE 1% Cream 1 Application(s) Topical two times a day    PRN MEDICATIONS  acetaminophen     Tablet .. 650 milliGRAM(s) Oral every 6 hours PRN  aluminum hydroxide/magnesium hydroxide/simethicone Suspension 30 milliLiter(s) Oral every 4 hours PRN  ondansetron Injectable 4 milliGRAM(s) IV Push every 8 hours PRN    VITALS:   T(F): 99.8  HR: 104  BP: 139/97  RR: 31  SpO2: 93%    PHYSICAL EXAM:    General: ill looking  Lungs: dec bs both bases  Cardiovascular: Regular   Abdomen: Soft, Positive BS  Extremities: No clubbing   not following commands  foot ulcer      LABS:                        8.8    5.81  )-----------( 273      ( 23 Jan 2024 05:15 )             28.5     01-23    138  |  99  |  7<L>  ----------------------------<  73  3.9   |  29  |  0.6<L>    Ca    8.1<L>      23 Jan 2024 05:15  Mg     1.8     01-22    TPro  5.8<L>  /  Alb  2.8<L>  /  TBili  <0.2  /  DBili  x   /  AST  28  /  ALT  20  /  AlkPhos  75  01-23      Urinalysis Basic - ( 23 Jan 2024 05:15 )    Color: x / Appearance: x / SG: x / pH: x  Gluc: 73 mg/dL / Ketone: x  / Bili: x / Urobili: x   Blood: x / Protein: x / Nitrite: x   Leuk Esterase: x / RBC: x / WBC x   Sq Epi: x / Non Sq Epi: x / Bacteria: x            Culture - Urine (collected 21 Jan 2024 05:00)  Source: Clean Catch Clean Catch (Midstream)  Final Report (22 Jan 2024 07:15):    No growth    Culture - Blood (collected 20 Jan 2024 16:15)  Source: .Blood Blood-Peripheral  Gram Stain (21 Jan 2024 20:33):    Growth in aerobic bottle: Gram positive cocci in pairs  Final Report (22 Jan 2024 19:04):    Growth in aerobic bottle: Staphylococcus hominis    Isolation of Coagulase negative Staphylococcus from single blood culture    sets may represent    contamination. Contact the Microbiology Department at [phone] if    susceptibility testing is    clinically indicated.    Direct identification is available within approximately 3-5    hours either by Blood Panel Multiplexed PCR or Direct    MALDI-TOF. Details: [email or web address]  Organism: Blood Culture PCR (22 Jan 2024 19:04)  Organism: Blood Culture PCR (22 Jan 2024 19:04)    Culture - Blood (collected 20 Jan 2024 16:15)  Source: .Blood Blood-Peripheral  Preliminary Report (22 Jan 2024 23:01):    No growth at 48 Hours        RADIOLOGY:      · Assessment	  58F w/ PMHx Down Syndrome, nonverbal at baseline, DVT on eliquis 5mg BID,  Hypothyroidism, aspiration pneumonia in prev admissions, Cerebral palsy and Seizure Disorders presents to the ED from nursing facility/group home ([facility]) presented to ED for respiratory distress and high grade fever. Patient found to be septic on admission. Admitted for management of acute respiratory distress likely 2/2 CAP or aspiration PNA.    #Sepsis POA(fever, tachy and leukocytosis)  #Acute hypoxic resp failure 2/2 Aspiration PNA or  RSV bronchiolitis  #h/o Dysphagia - Puree Diet at baseline  - Vitals on admission: Temp 104.5F (rectal), /74, , RR 24, SpO2 100% on BiPAP  - VBG Lactate 2.1, pH wnl and CO2 mildly elevated 61  - s/p 2L LR bolus  - previously fungitell +ve s/p Caspofungin on previous admission  - CXR shows- stable b/l pul opacities  - s/p Cefepime 2g IV x1, Levaquin 750mg IV x1, Zosyn 3.375mgv x 1   - c/w meropenem 1g q8 as per ID recs  - MRSA neg - vanc dcd(1/22)  - Lactate-1.2, procal 0.04   - f/u BCx- 1/20- G+ cocci- CoNS; 1/21- NG, UCx- neg  - RVP- RSV detected,   - MRSA swab neg, urine strep neg, urine legionella nrg, fungitell pending  - d-dimer 605 and LE duplex neg  - aspiration precautions  - NG tube placement and started on oral fluids    #Elevated Troponin (stable at 35)  - Trop 25>37>35>35  - EKG shows tachycardia, p waves present, likely sinus tach w/ a lot of artifact from shaking    #Acute on Chronic Normocytic Anemia (stable)  - Hgb 8.9 (previously 9.5)  - INR 1.43  - no evidence of hemorrhage  - b12 normal, folate normal, iron studies(ferritin 128)  - monitor H&H  - transfuse PRN, keep hgb >7    #h/o DVT of L Common Femoral Vein  - on Eliquis 5mg BID at home  - will switch to Lovenox for now while patient is on BiPAP - resume Eliquis if patient can tolerate PO  - started patient on Lovenox 60mg BID for Full AC based on weight of previous admission - change to new weight when available  - LE duplex neg    #Seizure Disorder  - on PO Keprra 500mg BID  - will switch to IV Keppra for now while patient is on BiPAP - resume PO Keppra when patient can tolerate PO  - f/u Keppra level    #Hypothyroidism  - not on any medications at home  - TSH- 0.51    #h/o Right Foot OM (1st toe stump and 2nd distal phalanx)  #h/o Multiple Left Foot deep tissue injuries  #h/o Sacral Wound  - Patient underwent excisional debridement of ulcer of right foot (including 1st metatarsal) and partial amputation 2nd toe on previous admissions  - past wound cultures grew Proteus and ESBL E coli  - reposition q2hrs to prevent pressure injury  - local wound care  - no acute surgical interventions from podiatry and burn    #Down Syndrome  #Cerebral Palsy  #Non-Verbal    #DVT ppx: on Eliquis 5mg PO BID - Lovenox Full AC for now - Resume Eliquis when no longer on BiPAP  #GI ppx: Protonix 40mg IV daily - switch to PO when patient can tolerate PO  #Diet: NPO for now, Puree diet at baseline - on BiPAP - f/u S&S c/s  #Activity: IAT - PT consult  #Code Status: Full Code  #Dispo: from Group Home ([facility]), SDU for now, acute

## 2024-01-23 NOTE — PROGRESS NOTE ADULT - ASSESSMENT
IMPRESSION:    Acute hypoxemic respiratory failure   Sepsis POA  Septic shock  RSV  Recurrent aspiration pneumonia/ prior intubation  HO GI bleed  HO OM  HO recent duodenal perforation   HO polymicrobial bacteremia   H/o CP  H/o seizures    PLAN:    CNS:  Avoid CNS Depressant, AED OP    HEENT: Oral care.  ET care     PULMONARY: HOB at 45 degrees.  Aspiration precaution.  keep SAO2 92 TO 96%    CARDIOVASCULAR: Goal directed fluid resuscitation.  Taper pressors    GI: Protonix, NGT feeding    RENAL:    FU lytes.  Correct as needed.      INFECTIOUS DISEASE:  ABX PER ID,repeat blood cx    HEMATOLOGICAL: DVT prophylaxis se.    ENDOCRINE:  Follow up FS.  Insulin protocol if needed.    MUSCULOSKELETAL: Bedrest.  Off loading.  Wound care.      Prognosis very poor    MICU   R IJ 1/21 IMPRESSION:    Acute hypoxemic respiratory failure   Sepsis POA  Septic shock resolved  RSV  Recurrent aspiration pneumonia/ prior intubation  HO GI bleed  HO OM  HO recent duodenal perforation   HO polymicrobial bacteremia   H/o CP  H/o seizures    PLAN:    CNS:  Avoid CNS Depressant, AED OP    HEENT: Oral care.  ET care     PULMONARY: HOB at 45 degrees.  Aspiration precaution.  keep SAO2 92 TO 96%    CARDIOVASCULAR: off levophed d civf    GI: Protonix, NGT feeding    RENAL:    FU lytes.  Correct as needed.      INFECTIOUS DISEASE:  ABX PER ID,repeat blood cx -    HEMATOLOGICAL: DVT prophylaxis se.    ENDOCRINE:  Follow up FS.  Insulin protocol if needed.    MUSCULOSKELETAL: Bedrest.  Off loading.  Wound care.      Prognosis very poor    floor  R IJ 1/21 arturo barlow

## 2024-01-24 LAB
1,3 BETA GLUCAN SER QL: POSITIVE
ALBUMIN SERPL ELPH-MCNC: 2.9 G/DL — LOW (ref 3.5–5.2)
ALP SERPL-CCNC: 69 U/L — SIGNIFICANT CHANGE UP (ref 30–115)
ALT FLD-CCNC: 19 U/L — SIGNIFICANT CHANGE UP (ref 0–41)
ANION GAP SERPL CALC-SCNC: 11 MMOL/L — SIGNIFICANT CHANGE UP (ref 7–14)
AST SERPL-CCNC: 29 U/L — SIGNIFICANT CHANGE UP (ref 0–41)
BASOPHILS # BLD AUTO: 0 K/UL — SIGNIFICANT CHANGE UP (ref 0–0.2)
BASOPHILS NFR BLD AUTO: 0 % — SIGNIFICANT CHANGE UP (ref 0–1)
BILIRUB SERPL-MCNC: <0.2 MG/DL — SIGNIFICANT CHANGE UP (ref 0.2–1.2)
BUN SERPL-MCNC: 13 MG/DL — SIGNIFICANT CHANGE UP (ref 10–20)
CALCIUM SERPL-MCNC: 8.1 MG/DL — LOW (ref 8.4–10.5)
CHLORIDE SERPL-SCNC: 99 MMOL/L — SIGNIFICANT CHANGE UP (ref 98–110)
CO2 SERPL-SCNC: 30 MMOL/L — SIGNIFICANT CHANGE UP (ref 17–32)
CREAT SERPL-MCNC: 0.6 MG/DL — LOW (ref 0.7–1.5)
EGFR: 105 ML/MIN/1.73M2 — SIGNIFICANT CHANGE UP
EOSINOPHIL # BLD AUTO: 0 K/UL — SIGNIFICANT CHANGE UP (ref 0–0.7)
EOSINOPHIL NFR BLD AUTO: 0 % — SIGNIFICANT CHANGE UP (ref 0–8)
FUNGITELL: 325 PG/ML
GLUCOSE SERPL-MCNC: 106 MG/DL — HIGH (ref 70–99)
HCT VFR BLD CALC: 28.8 % — LOW (ref 37–47)
HGB BLD-MCNC: 8.9 G/DL — LOW (ref 12–16)
LEVETIRACETAM SERPL-MCNC: 16.9 UG/ML — SIGNIFICANT CHANGE UP (ref 10–40)
LYMPHOCYTES # BLD AUTO: 0.24 K/UL — LOW (ref 1.2–3.4)
LYMPHOCYTES # BLD AUTO: 7.9 % — LOW (ref 20.5–51.1)
MAGNESIUM SERPL-MCNC: 2.3 MG/DL — SIGNIFICANT CHANGE UP (ref 1.8–2.4)
MCHC RBC-ENTMCNC: 25.1 PG — LOW (ref 27–31)
MCHC RBC-ENTMCNC: 30.9 G/DL — LOW (ref 32–37)
MCV RBC AUTO: 81.1 FL — SIGNIFICANT CHANGE UP (ref 81–99)
MONOCYTES # BLD AUTO: 0.05 K/UL — LOW (ref 0.1–0.6)
MONOCYTES NFR BLD AUTO: 1.7 % — SIGNIFICANT CHANGE UP (ref 1.7–9.3)
NEUTROPHILS # BLD AUTO: 2.75 K/UL — SIGNIFICANT CHANGE UP (ref 1.4–6.5)
NEUTROPHILS NFR BLD AUTO: 87.7 % — HIGH (ref 42.2–75.2)
PLATELET # BLD AUTO: 269 K/UL — SIGNIFICANT CHANGE UP (ref 130–400)
PMV BLD: 10.4 FL — SIGNIFICANT CHANGE UP (ref 7.4–10.4)
POTASSIUM SERPL-MCNC: 3.8 MMOL/L — SIGNIFICANT CHANGE UP (ref 3.5–5)
POTASSIUM SERPL-SCNC: 3.8 MMOL/L — SIGNIFICANT CHANGE UP (ref 3.5–5)
PROT SERPL-MCNC: 6 G/DL — SIGNIFICANT CHANGE UP (ref 6–8)
RBC # BLD: 3.55 M/UL — LOW (ref 4.2–5.4)
RBC # FLD: 18.4 % — HIGH (ref 11.5–14.5)
SODIUM SERPL-SCNC: 140 MMOL/L — SIGNIFICANT CHANGE UP (ref 135–146)
WBC # BLD: 3.1 K/UL — LOW (ref 4.8–10.8)
WBC # FLD AUTO: 3.1 K/UL — LOW (ref 4.8–10.8)

## 2024-01-24 PROCEDURE — 71045 X-RAY EXAM CHEST 1 VIEW: CPT | Mod: 26

## 2024-01-24 PROCEDURE — 99232 SBSQ HOSP IP/OBS MODERATE 35: CPT

## 2024-01-24 RX ORDER — IPRATROPIUM/ALBUTEROL SULFATE 18-103MCG
3 AEROSOL WITH ADAPTER (GRAM) INHALATION EVERY 6 HOURS
Refills: 0 | Status: DISCONTINUED | OUTPATIENT
Start: 2024-01-24 | End: 2024-02-04

## 2024-01-24 RX ORDER — MIDODRINE HYDROCHLORIDE 2.5 MG/1
7.5 TABLET ORAL EVERY 8 HOURS
Refills: 0 | Status: DISCONTINUED | OUTPATIENT
Start: 2024-01-24 | End: 2024-01-25

## 2024-01-24 RX ORDER — ALBUTEROL 90 UG/1
2 AEROSOL, METERED ORAL
Refills: 0 | Status: COMPLETED | OUTPATIENT
Start: 2024-01-24 | End: 2024-12-22

## 2024-01-24 RX ORDER — APIXABAN 2.5 MG/1
5 TABLET, FILM COATED ORAL
Refills: 0 | Status: DISCONTINUED | OUTPATIENT
Start: 2024-01-24 | End: 2024-02-05

## 2024-01-24 RX ADMIN — APIXABAN 5 MILLIGRAM(S): 2.5 TABLET, FILM COATED ORAL at 17:21

## 2024-01-24 RX ADMIN — CHLORHEXIDINE GLUCONATE 1 APPLICATION(S): 213 SOLUTION TOPICAL at 11:46

## 2024-01-24 RX ADMIN — MIDODRINE HYDROCHLORIDE 10 MILLIGRAM(S): 2.5 TABLET ORAL at 05:11

## 2024-01-24 RX ADMIN — MIDODRINE HYDROCHLORIDE 7.5 MILLIGRAM(S): 2.5 TABLET ORAL at 21:41

## 2024-01-24 RX ADMIN — PANTOPRAZOLE SODIUM 40 MILLIGRAM(S): 20 TABLET, DELAYED RELEASE ORAL at 05:11

## 2024-01-24 RX ADMIN — MIDODRINE HYDROCHLORIDE 10 MILLIGRAM(S): 2.5 TABLET ORAL at 15:06

## 2024-01-24 RX ADMIN — Medication 1 DROP(S): at 17:15

## 2024-01-24 RX ADMIN — POLYETHYLENE GLYCOL 3350 17 GRAM(S): 17 POWDER, FOR SOLUTION ORAL at 21:42

## 2024-01-24 RX ADMIN — Medication 3 MILLIGRAM(S): at 21:41

## 2024-01-24 RX ADMIN — LEVETIRACETAM 500 MILLIGRAM(S): 250 TABLET, FILM COATED ORAL at 17:15

## 2024-01-24 RX ADMIN — MEROPENEM 100 MILLIGRAM(S): 1 INJECTION INTRAVENOUS at 05:10

## 2024-01-24 RX ADMIN — Medication 1 DROP(S): at 05:11

## 2024-01-24 RX ADMIN — Medication 3 MILLILITER(S): at 03:37

## 2024-01-24 RX ADMIN — RALOXIFENE HYDROCHLORIDE 60 MILLIGRAM(S): 60 TABLET, COATED ORAL at 11:47

## 2024-01-24 RX ADMIN — Medication 1 APPLICATION(S): at 06:20

## 2024-01-24 RX ADMIN — GABAPENTIN 300 MILLIGRAM(S): 400 CAPSULE ORAL at 05:11

## 2024-01-24 RX ADMIN — LEVETIRACETAM 500 MILLIGRAM(S): 250 TABLET, FILM COATED ORAL at 05:11

## 2024-01-24 RX ADMIN — Medication 3 MILLILITER(S): at 14:09

## 2024-01-24 RX ADMIN — Medication 1 APPLICATION(S): at 17:23

## 2024-01-24 RX ADMIN — MEROPENEM 100 MILLIGRAM(S): 1 INJECTION INTRAVENOUS at 21:42

## 2024-01-24 RX ADMIN — GABAPENTIN 300 MILLIGRAM(S): 400 CAPSULE ORAL at 17:15

## 2024-01-24 RX ADMIN — Medication 3 MILLILITER(S): at 20:00

## 2024-01-24 RX ADMIN — MEROPENEM 100 MILLIGRAM(S): 1 INJECTION INTRAVENOUS at 14:57

## 2024-01-24 RX ADMIN — Medication 40 MILLIGRAM(S): at 05:10

## 2024-01-24 RX ADMIN — SENNA PLUS 2 TABLET(S): 8.6 TABLET ORAL at 21:42

## 2024-01-24 RX ADMIN — Medication 1 TABLET(S): at 17:15

## 2024-01-24 NOTE — DIETITIAN INITIAL EVALUATION ADULT - ENTERAL
Recommend change EN to: Jevity 1.2 via NG tube 270 mL q6h - provides 1296 kcal, 60 g protein, 870 mL free water. Flush with 25 mL water pre/post feeds - team to adjust prn, PO fluids per pt  Recommend Prosource TF once daily - provides additional 40 kcal and 11 g protein  Please monitor GI symptoms, keep aspiration precautions, and HOB elevated > 45 degrees  Recommend advance diet per SLP recommendations Recommend change EN to: Jevity 1.2 via NG tube 270 mL q6h - provides 1296 kcal, 60 g protein, 870 mL free water. Flush with 75 mL water pre/post feeds - team to adjust prn  Recommend Prosource TF once daily - provides additional 40 kcal and 11 g protein  Please monitor GI symptoms, keep aspiration precautions, and HOB elevated > 45 degrees  Recommend advance diet per SLP recommendations

## 2024-01-24 NOTE — DIETITIAN INITIAL EVALUATION ADULT - ADD RECOMMEND
Nutrition intervention: EN  Nutrition monitoring: TF tolerance, body composition, PO intake, labs, NFPF

## 2024-01-24 NOTE — DIETITIAN INITIAL EVALUATION ADULT - OTHER INFO
58F presents to the ED from nursing facility/group home ([facility]) presented to ED for respiratory distress and high grade fever. Patient found to be septic on admission. As per aide Janette at bedside, patient had been coughing and congested for 3x days.  #h/o Dysphagia - Puree Diet at baseline - NG tube placement and started on oral fluids  #Acute on Chronic Normocytic Anemia (stable)  #h/o Right Foot OM (1st toe stump and 2nd distal phalanx)  #h/o Multiple Left Foot deep tissue injuries  #h/o Sacral Wound  Patient's weight hx at the group home provided upon last admission: From January 2023 to Oct 2023 weight fluctuated from 115 - 119 lbs. Weight last admission Oct 23 was 110 lb. CBW is 115 lb. Weight stable.

## 2024-01-24 NOTE — CHART NOTE - NSCHARTNOTEFT_GEN_A_CORE
Patient noted to have wheezing b/l lower lobes; saturating well. Ordered Solumedrol 40 IVP x1. Will continue to monitor.

## 2024-01-24 NOTE — DIETITIAN INITIAL EVALUATION ADULT - NS FNS DIET ORDER
Diet, NPO with Tube Feed:   Tube Feeding Modality: Nasogastric  Jevity 1.2 Juventino  Total Volume for 24 Hours (mL): 240  Bolus  Total Volume of Bolus (mL):  80  Tube Feed Frequency: Every 8 hours   Tube Feed Start Time: 12:00  Bolus Feed Rate (mL per Hour): 80   Bolus Feed Duration (in Hours): 1.5 (01-23-24 @ 12:06)

## 2024-01-24 NOTE — PROGRESS NOTE ADULT - ASSESSMENT
58F w/ PMHx Down Syndrome, nonverbal at baseline, DVT on eliquis 5mg BID,  Hypothyroidism, aspiration pneumonia in prev admissions, Cerebral palsy and Seizure Disorders presents to the ED from nursing facility/group home [facility]) presented to ED for respiratory distress and high grade fever. Patient found to be septic on admission. Admitted for management of acute respiratory distress likely 2/2 CAP or aspiration PNA.    #Sepsis POA(fever, tachy and leukocytosis)  #Acute hypoxic resp failure 2/2 Aspiration PNA or  RSV bronchiolitis  #h/o Dysphagia - Puree Diet at baseline  - Vitals on admission: Temp 104.5F (rectal), /74, , RR 24, SpO2 100% on BiPAP  - VBG Lactate 2.1, pH wnl and CO2 mildly elevated 61  - s/p 2L LR bolus  - previously fungitell +ve s/p Caspofungin on previous admission  - CXR shows- stable b/l pul opacities  - s/p Cefepime 2g IV x1, Levaquin 750mg IV x1, Zosyn 3.375mgv x 1   - c/w meropenem 1g q8 as per ID recs  - MRSA neg - vanc dcd(1/22)  - Lactate-1.2, procal 0.04   - f/u BCx- 1/20- G+ cocci- CoNS; 1/21- NG, UCx- neg  - RVP- RSV detected,   - MRSA swab neg, urine strep neg, urine legionella nrg, fungitell pending  - d-dimer 605 and LE duplex neg  - aspiration precautions  - NG tube placement   - tube feeds initiated  - SLP: npo by mouth  - pt tachycardic on 1/24: decreased midodrine to 7.5mg tid and increased FWF  - cannot do bipap in a pt who is nonverbal and unable to communicate  - f/u on tube feeds with RD   - resume eliquis via NGT  - will have to find out who the guardian is for decisions    #Elevated Troponin (stable at 35)  - Trop 25>37>35>35  - EKG shows tachycardia, p waves present, likely sinus tach w/ a lot of artifact from shaking    #Acute on Chronic Normocytic Anemia (stable)  - Hgb 8.9 (previously 9.5)  - INR 1.43  - no evidence of hemorrhage  - b12 normal, folate normal, iron studies(ferritin 128)  - monitor H&H  - transfuse PRN, keep hgb >7    #h/o DVT of L Common Femoral Vein  - on Eliquis 5mg BID at home  - LE duplex neg    #Seizure Disorder  - on IV Keppra 500mg BID  - f/u Keppra level    #Hypothyroidism  - not on any medications at home  - TSH- 0.51    #h/o Right Foot OM (1st toe stump and 2nd distal phalanx)  #h/o Multiple Left Foot deep tissue injuries  #h/o Sacral Wound  - Patient underwent excisional debridement of ulcer of right foot (including 1st metatarsal) and partial amputation 2nd toe on previous admissions  - past wound cultures grew Proteus and ESBL E coli  - reposition q2hrs to prevent pressure injury  - local wound care  - no acute surgical interventions from podiatry and burn    #Down Syndrome  #Cerebral Palsy  #Non-Verbal    #DVT ppx: on Eliquis 5mg PO BID   #GI ppx: Protonix 40mg IV daily  #Diet: tube feeds  #Activity: IAT   #Code Status: Full Code  #Dispo: from Group Home ([facility]), SDU for now, acute

## 2024-01-24 NOTE — DIETITIAN INITIAL EVALUATION ADULT - PERTINENT MEDS FT
MEDICATIONS  (STANDING):  calcium carbonate   1250 mG (OsCal) 1 Tablet(s) Oral two times a day  enoxaparin Injectable 50 milliGRAM(s) SubCutaneous every 12 hours  melatonin 3 milliGRAM(s) Oral at bedtime  meropenem  IVPB 1000 milliGRAM(s) IV Intermittent every 8 hours  midodrine. 10 milliGRAM(s) Oral every 8 hours  norepinephrine Infusion 0.05 MICROgram(s)/kG/Min (2.45 mL/Hr) IV Continuous <Continuous>  pantoprazole  Injectable 40 milliGRAM(s) IV Push every 24 hours  polyethylene glycol 3350 17 Gram(s) Oral at bedtime  raloxifene 60 milliGRAM(s) Oral daily  senna 2 Tablet(s) Oral at bedtime    MEDICATIONS  (PRN):  acetaminophen Tablet .. 650 milliGRAM(s) Oral every 6 hours PRN Temp greater or equal to 38C (100.4F), Mild Pain (1 - 3)  aluminum hydroxide/magnesium hydroxide/simethicone Suspension 30 milliLiter(s) Oral every 4 hours PRN Dyspepsia  ondansetron Injectable 4 milliGRAM(s) IV Push every 8 hours PRN Nausea and/or Vomiting

## 2024-01-24 NOTE — PROGRESS NOTE ADULT - SUBJECTIVE AND OBJECTIVE BOX
Patient is a 58y old  Female who presents with a chief complaint of Sepsis        SUBJECTIVE / OVERNIGHT EVENTS: Pt following me with her eyes and head. Non verbal. Shaking. NGT in place. Passed TOV and on primafit for now.   ADDITIONAL REVIEW OF SYSTEMS: as above    MEDICATIONS  (STANDING):  albuterol    90 MICROgram(s) HFA Inhaler 2 Puff(s) Inhalation two times a day  albuterol/ipratropium for Nebulization 3 milliLiter(s) Nebulizer every 6 hours  apixaban 5 milliGRAM(s) Oral two times a day  artificial  tears Solution 1 Drop(s) Both EYES two times a day  calcium carbonate   1250 mG (OsCal) 1 Tablet(s) Oral two times a day  chlorhexidine 2% Cloths 1 Application(s) Topical daily  gabapentin 300 milliGRAM(s) Oral every 12 hours  levETIRAcetam  Solution 500 milliGRAM(s) Oral every 12 hours  melatonin 3 milliGRAM(s) Oral at bedtime  meropenem  IVPB 1000 milliGRAM(s) IV Intermittent every 8 hours  midodrine. 10 milliGRAM(s) Oral every 8 hours  pantoprazole  Injectable 40 milliGRAM(s) IV Push every 24 hours  polyethylene glycol 3350 17 Gram(s) Oral at bedtime  raloxifene 60 milliGRAM(s) Oral daily  senna 2 Tablet(s) Oral at bedtime  silver sulfADIAZINE 1% Cream 1 Application(s) Topical two times a day    MEDICATIONS  (PRN):  acetaminophen     Tablet .. 650 milliGRAM(s) Oral every 6 hours PRN Temp greater or equal to 38C (100.4F), Mild Pain (1 - 3)  aluminum hydroxide/magnesium hydroxide/simethicone Suspension 30 milliLiter(s) Oral every 4 hours PRN Dyspepsia  ondansetron Injectable 4 milliGRAM(s) IV Push every 8 hours PRN Nausea and/or Vomiting      CAPILLARY BLOOD GLUCOSE        I&O's Summary    23 Jan 2024 07:01  -  24 Jan 2024 07:00  --------------------------------------------------------  IN: 50 mL / OUT: 40 mL / NET: 10 mL    24 Jan 2024 07:01  -  24 Jan 2024 18:17  --------------------------------------------------------  IN: 50 mL / OUT: 55 mL / NET: -5 mL        PHYSICAL EXAM:  Vital Signs Last 24 Hrs  T(C): 36.8 (24 Jan 2024 12:00), Max: 36.8 (24 Jan 2024 12:00)  T(F): 98.3 (24 Jan 2024 12:00), Max: 98.3 (24 Jan 2024 12:00)  HR: 112 (24 Jan 2024 15:04) (88 - 117)  BP: 115/55 (24 Jan 2024 15:04) (99/50 - 160/66)  BP(mean): --  RR: 18 (24 Jan 2024 12:00) (18 - 27)  SpO2: 95% (24 Jan 2024 05:00) (95% - 97%)    Parameters below as of 24 Jan 2024 05:00  Patient On (Oxygen Delivery Method): nasal cannula  O2 Flow (L/min): 2    CONSTITUTIONAL: Shaking  ENMT: dry oral mucosa, no pharyngeal injection or exudates; NGT in place  NECK: Supple, no palpable masses; no thyromegaly  RESPIRATORY: Normal respiratory effort; mild rhonchi in b/l bases  CARDIOVASCULAR: Regular rate and rhythm, normal S1 and S2, no murmur/rub/gallop; Peripheral pulses are 2+ bilaterally  ABDOMEN: difficult to examine due to contracted lower extremities  MUSCULOSKELETAL: contracted extremities  PSYCH: non verbal, developmental delays  SKIN: unable to turn pt due to lack of help as 2 people required, however pt remains high risk for skin breakdown due to poor nutrition, lack of off loading and contracted state, and mental status    LABS:                        8.9    3.10  )-----------( 269      ( 24 Jan 2024 07:57 )             28.8     01-24    140  |  99  |  13  ----------------------------<  106<H>  3.8   |  30  |  0.6<L>    Ca    8.1<L>      24 Jan 2024 07:57  Mg     2.3     01-24    TPro  6.0  /  Alb  2.9<L>  /  TBili  <0.2  /  DBili  x   /  AST  29  /  ALT  19  /  AlkPhos  69  01-24          Urinalysis Basic - ( 24 Jan 2024 07:57 )    Color: x / Appearance: x / SG: x / pH: x  Gluc: 106 mg/dL / Ketone: x  / Bili: x / Urobili: x   Blood: x / Protein: x / Nitrite: x   Leuk Esterase: x / RBC: x / WBC x   Sq Epi: x / Non Sq Epi: x / Bacteria: x        Culture - Blood (collected 22 Jan 2024 16:51)  Source: .Blood None  Preliminary Report (24 Jan 2024 01:04):    No growth at 24 hours        RADIOLOGY & ADDITIONAL TESTS:  Results Reviewed:   Imaging Personally Reviewed:  Electrocardiogram Personally Reviewed:    COORDINATION OF CARE:  Care Discussed with Consultants/Other Providers [Y/N]:  Prior or Outpatient Records Reviewed [Y/N]:

## 2024-01-24 NOTE — DIETITIAN INITIAL EVALUATION ADULT - ENERGY INTAKE
Pt currently receiving Jevity 1.2 240 mL q8hr - provides 864 kcal, 40 g protein. This EN regimen is not meeting pt's needs.

## 2024-01-24 NOTE — DIETITIAN INITIAL EVALUATION ADULT - ORAL INTAKE PTA/DIET HISTORY
Pt nonverbal. This writer was unable to reach [facility] home to obtain hx. Per chart pt is still on puree diet at baseline.  Hx provided by [facility] aide at bedside to RD Oct 2023: Patient was on a puree diet with thin liquids. She was able to feed herself and ate very well. NKFA, no food intolerances.

## 2024-01-24 NOTE — DIETITIAN INITIAL EVALUATION ADULT - ETIOLOGY
[facility]  Inpatient Cardiology Consult             Date:   8/19/2023  Patient name: Steve Garnett  Date of admission:  8/18/2023  4:56 PM  MRN:   [id number]  YOB: 1939      Reason for Admission:  Near-syncope    CHIEF COMPLAINT:  Lightheadedness     History Obtained From:  Patient, family and medical record    HISTORY OF PRESENT ILLNESS:      The patient is a 81 y.o gentleman with h/o HTN, HLP, DM, SSS, PAF, PPM and CAD,  s/p CABG X3 on 7/25/23, who presented to he ER yesterday afternoon for near-syncope. He had been doing well after bypass surgery, but had gone outside to sit in the sun for about one half-hour yesterday. His wife came back outside and found him c/o weakness and lightheadedness. He closed his eyes for several seconds but was easily awakened, then began to vomit briefly. In the ER rhythm was sinus, with no EKG changes, with subsequent evaluation showing an orthostatic BP drop from 121 to 95 mmHg. Review of pacemaker interrogation shows no arrhythmia from yesterday, with stable pacing and sensing thresholds. Head CT showed no acute abnormality. Past Medical History:   has a past medical history of Anxiety, CAD (coronary artery disease), GERD (gastroesophageal reflux disease), Gout, Hyperlipidemia, Hypertension, Lymphoma in remission ([address]), and Type II or unspecified type diabetes mellitus without mention of complication, not stated as uncontrolled. Past Surgical History:   has a past surgical history that includes Appendectomy (age 26); Coronary angioplasty with stent; Colonoscopy; back surgery; Carotid endarterectomy (Right); Coronary artery bypass graft (N/A, 7/26/2023); ep device procedure (N/A, 8/3/2023); Cardiac procedure (N/A, 7/25/2023); Cardiac procedure (N/A, 7/25/2023); and Cardiac procedure (N/A, 7/25/2023). Home Medications:    Prior to Admission medications    Medication Sig Start Date End Date Taking?  Authorizing Provider   metoprolol in the last 72 hours. FASTING LIPID PANEL:  Lab Results   Component Value Date/Time    HDL 52 06/12/2023 10:20 AM    LDLCALC 80 06/12/2023 10:20 AM    TRIG 143 06/12/2023 10:20 AM     LIVER PROFILE:  Recent Labs     08/18/23  1708   AST 9   ALT 15   LABALBU 3.8         IMPRESSION     Near-syncope c/w vasovagal mechanism  Multivessel CAD, s/p CABG on 7/25/23  SSS s/p PPM implantation on 8/3/23; site is healing well with pacemaker interrogation yesterday showing stable thresholds with normal device function. Mild orthostatic hypotension with volume depletion and MELO  Type II DM  Essential HTN  CKD        RECOMMENDATIONS:  Discontinue Lasix and use on PRN basis only  Continue IV NS at 50 ml/ hr  Continue metoprolol, amiodarone and Eliquis  Possible RLL pneumonia    Discussed with patient and nursing.     Electronically signed by Martinez Astorga MD on 8/19/2023 at 3:49 PM.  [facility] cardiology Consultant current hospitalization

## 2024-01-24 NOTE — DIETITIAN INITIAL EVALUATION ADULT - NSFNSPHYEXAMSKINFT_GEN_A_CORE
Sacral fissure, right lateral foot wound, h/o deep tissue injuries to left foot   Sacral fissure, right lateral foot wound, h/o deep tissue injuries to left foot  Pressure injuries: L heel, R butt, L butt all unstageable

## 2024-01-24 NOTE — DIETITIAN INITIAL EVALUATION ADULT - PERTINENT LABORATORY DATA
01-24    140  |  99  |  13  ----------------------------<  106<H>  3.8   |  30  |  0.6<L>    Ca    8.1<L>      24 Jan 2024 07:57  Mg     2.3     01-24    TPro  6.0  /  Alb  2.9<L>  /  TBili  <0.2  /  DBili  x   /  AST  29  /  ALT  19  /  AlkPhos  69  01-24

## 2024-01-24 NOTE — DIETITIAN INITIAL EVALUATION ADULT - OTHER CALCULATIONS
Needs calculated considering sepsis, wounds  MSJ x 1.3-1.5= 5288-3935 kcal/d  Protein 1.3-1.5 g/kg = 68-78 g/d  Fluid 1 mL/kcal = 5478-8201 mL/d Needs calculated considering sepsis, wounds  MSJ x 1.3-1.5= 7378-6863 kcal/d  Protein 1.3-1.5 g/kg = 68-78 g/d  Fluid 25-30 kcal/kg = 2463-9304 mL/d

## 2024-01-25 LAB
ALBUMIN SERPL ELPH-MCNC: 2.9 G/DL — LOW (ref 3.5–5.2)
ALP SERPL-CCNC: 73 U/L — SIGNIFICANT CHANGE UP (ref 30–115)
ALT FLD-CCNC: 20 U/L — SIGNIFICANT CHANGE UP (ref 0–41)
ANION GAP SERPL CALC-SCNC: 8 MMOL/L — SIGNIFICANT CHANGE UP (ref 7–14)
AST SERPL-CCNC: 23 U/L — SIGNIFICANT CHANGE UP (ref 0–41)
BASOPHILS # BLD AUTO: 0.01 K/UL — SIGNIFICANT CHANGE UP (ref 0–0.2)
BASOPHILS NFR BLD AUTO: 0.2 % — SIGNIFICANT CHANGE UP (ref 0–1)
BILIRUB SERPL-MCNC: <0.2 MG/DL — SIGNIFICANT CHANGE UP (ref 0.2–1.2)
BUN SERPL-MCNC: 23 MG/DL — HIGH (ref 10–20)
CALCIUM SERPL-MCNC: 8.3 MG/DL — LOW (ref 8.4–10.4)
CHLORIDE SERPL-SCNC: 105 MMOL/L — SIGNIFICANT CHANGE UP (ref 98–110)
CO2 SERPL-SCNC: 33 MMOL/L — HIGH (ref 17–32)
CREAT SERPL-MCNC: 0.6 MG/DL — LOW (ref 0.7–1.5)
CULTURE RESULTS: NO GROWTH — SIGNIFICANT CHANGE UP
CULTURE RESULTS: SIGNIFICANT CHANGE UP
EGFR: 105 ML/MIN/1.73M2 — SIGNIFICANT CHANGE UP
EOSINOPHIL # BLD AUTO: 0 K/UL — SIGNIFICANT CHANGE UP (ref 0–0.7)
EOSINOPHIL NFR BLD AUTO: 0 % — SIGNIFICANT CHANGE UP (ref 0–8)
GLUCOSE SERPL-MCNC: 107 MG/DL — HIGH (ref 70–99)
HCT VFR BLD CALC: 31.2 % — LOW (ref 37–47)
HGB BLD-MCNC: 9.9 G/DL — LOW (ref 12–16)
IMM GRANULOCYTES NFR BLD AUTO: 0.5 % — HIGH (ref 0.1–0.3)
LYMPHOCYTES # BLD AUTO: 1.61 K/UL — SIGNIFICANT CHANGE UP (ref 1.2–3.4)
LYMPHOCYTES # BLD AUTO: 40 % — SIGNIFICANT CHANGE UP (ref 20.5–51.1)
MAGNESIUM SERPL-MCNC: 2.9 MG/DL — HIGH (ref 1.8–2.4)
MCHC RBC-ENTMCNC: 25.4 PG — LOW (ref 27–31)
MCHC RBC-ENTMCNC: 31.7 G/DL — LOW (ref 32–37)
MCV RBC AUTO: 80.2 FL — LOW (ref 81–99)
MONOCYTES # BLD AUTO: 0.59 K/UL — SIGNIFICANT CHANGE UP (ref 0.1–0.6)
MONOCYTES NFR BLD AUTO: 14.7 % — HIGH (ref 1.7–9.3)
NEUTROPHILS # BLD AUTO: 1.79 K/UL — SIGNIFICANT CHANGE UP (ref 1.4–6.5)
NEUTROPHILS NFR BLD AUTO: 44.6 % — SIGNIFICANT CHANGE UP (ref 42.2–75.2)
NRBC # BLD: 0 /100 WBCS — SIGNIFICANT CHANGE UP (ref 0–0)
PLATELET # BLD AUTO: 364 K/UL — SIGNIFICANT CHANGE UP (ref 130–400)
PMV BLD: 10.2 FL — SIGNIFICANT CHANGE UP (ref 7.4–10.4)
POTASSIUM SERPL-MCNC: 4.2 MMOL/L — SIGNIFICANT CHANGE UP (ref 3.5–5)
POTASSIUM SERPL-SCNC: 4.2 MMOL/L — SIGNIFICANT CHANGE UP (ref 3.5–5)
PROT SERPL-MCNC: 6.5 G/DL — SIGNIFICANT CHANGE UP (ref 6–8)
RBC # BLD: 3.89 M/UL — LOW (ref 4.2–5.4)
RBC # FLD: 18.6 % — HIGH (ref 11.5–14.5)
SODIUM SERPL-SCNC: 146 MMOL/L — SIGNIFICANT CHANGE UP (ref 135–146)
SPECIMEN SOURCE: SIGNIFICANT CHANGE UP
SPECIMEN SOURCE: SIGNIFICANT CHANGE UP
WBC # BLD: 4.02 K/UL — LOW (ref 4.8–10.8)
WBC # FLD AUTO: 4.02 K/UL — LOW (ref 4.8–10.8)

## 2024-01-25 PROCEDURE — 99232 SBSQ HOSP IP/OBS MODERATE 35: CPT

## 2024-01-25 PROCEDURE — 71045 X-RAY EXAM CHEST 1 VIEW: CPT | Mod: 26

## 2024-01-25 RX ORDER — METOPROLOL TARTRATE 50 MG
25 TABLET ORAL EVERY 12 HOURS
Refills: 0 | Status: DISCONTINUED | OUTPATIENT
Start: 2024-01-25 | End: 2024-02-03

## 2024-01-25 RX ORDER — MIDODRINE HYDROCHLORIDE 2.5 MG/1
10 TABLET ORAL EVERY 8 HOURS
Refills: 0 | Status: DISCONTINUED | OUTPATIENT
Start: 2024-01-25 | End: 2024-01-31

## 2024-01-25 RX ADMIN — Medication 3 MILLILITER(S): at 09:27

## 2024-01-25 RX ADMIN — GABAPENTIN 300 MILLIGRAM(S): 400 CAPSULE ORAL at 17:03

## 2024-01-25 RX ADMIN — MEROPENEM 100 MILLIGRAM(S): 1 INJECTION INTRAVENOUS at 05:01

## 2024-01-25 RX ADMIN — Medication 1 TABLET(S): at 05:01

## 2024-01-25 RX ADMIN — LEVETIRACETAM 500 MILLIGRAM(S): 250 TABLET, FILM COATED ORAL at 17:03

## 2024-01-25 RX ADMIN — Medication 1 APPLICATION(S): at 05:02

## 2024-01-25 RX ADMIN — Medication 3 MILLIGRAM(S): at 21:49

## 2024-01-25 RX ADMIN — LEVETIRACETAM 500 MILLIGRAM(S): 250 TABLET, FILM COATED ORAL at 05:01

## 2024-01-25 RX ADMIN — APIXABAN 5 MILLIGRAM(S): 2.5 TABLET, FILM COATED ORAL at 05:01

## 2024-01-25 RX ADMIN — Medication 3 MILLILITER(S): at 19:39

## 2024-01-25 RX ADMIN — APIXABAN 5 MILLIGRAM(S): 2.5 TABLET, FILM COATED ORAL at 17:04

## 2024-01-25 RX ADMIN — Medication 1 TABLET(S): at 17:04

## 2024-01-25 RX ADMIN — MIDODRINE HYDROCHLORIDE 10 MILLIGRAM(S): 2.5 TABLET ORAL at 21:48

## 2024-01-25 RX ADMIN — PANTOPRAZOLE SODIUM 40 MILLIGRAM(S): 20 TABLET, DELAYED RELEASE ORAL at 04:59

## 2024-01-25 RX ADMIN — SENNA PLUS 2 TABLET(S): 8.6 TABLET ORAL at 21:48

## 2024-01-25 RX ADMIN — MIDODRINE HYDROCHLORIDE 10 MILLIGRAM(S): 2.5 TABLET ORAL at 15:28

## 2024-01-25 RX ADMIN — Medication 1 APPLICATION(S): at 17:03

## 2024-01-25 RX ADMIN — Medication 1 DROP(S): at 17:03

## 2024-01-25 RX ADMIN — GABAPENTIN 300 MILLIGRAM(S): 400 CAPSULE ORAL at 05:00

## 2024-01-25 RX ADMIN — MIDODRINE HYDROCHLORIDE 7.5 MILLIGRAM(S): 2.5 TABLET ORAL at 05:01

## 2024-01-25 RX ADMIN — Medication 1 DROP(S): at 05:01

## 2024-01-25 RX ADMIN — POLYETHYLENE GLYCOL 3350 17 GRAM(S): 17 POWDER, FOR SOLUTION ORAL at 21:49

## 2024-01-25 NOTE — PROGRESS NOTE ADULT - ASSESSMENT
58F w/ PMHx Down Syndrome, nonverbal at baseline, DVT on eliquis 5mg BID,  Hypothyroidism, aspiration pneumonia in prev admissions, Cerebral palsy and Seizure Disorders presents to the ED from nursing facility/group home ([facility]) presented to ED for respiratory distress and high grade fever. Patient found to be septic on admission. Admitted for management of acute respiratory distress likely 2/2 CAP or aspiration PNA.    #Sepsis POA(fever, tachy and leukocytosis)  #Acute hypoxic resp failure 2/2 Aspiration PNA or  RSV bronchiolitis  #h/o Dysphagia - Puree Diet at baseline  - VBG Lactate 2.1 improved to wnl, procal 0.04 pH wnl and CO2 mildly elevated 61  - s/p 2L LR bolus  - previously fungitell +ve s/p Caspofungin on previous admission  - CXR shows- stable b/l pulmonary opacities  - s/p Cefepime 2g IV x1, Levaquin 750mg IV x1, Zosyn 3.375mgv x 1   - c/w meropenem 1g q8 as per ID recs  - MRSA neg - vanc dcd (1/22)  - BCx (1/20): Staph hominis repeat BCx have been NGTD  - UCx: neg  - RVP- RSV detected  - MRSA swab neg, urine strep neg, urine legionella neg, fungitell positive at 325  - d-dimer 605 and LE duplex neg  - aspiration precautions  - NG tube placement   - tube feeds initiated  - SLP: npo by mouth  - pt still tachycardic with decreased midodrine, so resumed midodrine 10mg tid and added lopressor 25mg bid  - increased FWF  - f/u on tube feeds with RD   - resume eliquis via NGT  - spoke to the pt's advisory board and they would like to repeat an SLP eval in a few days    #Elevated Troponin (stable at 35)  - Trop 25>37>35>35  - EKG shows tachycardia, p waves present, likely sinus tach w/ a lot of artifact from shaking    #Acute on Chronic Normocytic Anemia (stable)  - Hgb 8.9 (previously 9.5)  - INR 1.43  - no evidence of hemorrhage  - b12 normal, folate normal, iron studies(ferritin 128)  - monitor H&H  - transfuse PRN, keep hgb >7    #h/o DVT of L Common Femoral Vein  - on Eliquis 5mg BID at home  - LE duplex neg    #Seizure Disorder  - on IV Keppra 500mg BID      #Hypothyroidism  - not on any medications at home  - TSH- 0.51    #h/o Right Foot OM (1st toe stump and 2nd distal phalanx)  #h/o Multiple Left Foot deep tissue injuries  #h/o Sacral Wound  - Patient underwent excisional debridement of ulcer of right foot (including 1st metatarsal) and partial amputation 2nd toe on previous admissions  - past wound cultures grew Proteus and ESBL E coli  - reposition q2hrs to prevent pressure injury  - local wound care  - no acute surgical interventions from podiatry and burn    #Down Syndrome  #Cerebral Palsy  #Non-Verbal    #DVT ppx: on Eliquis 5mg PO BID   #GI ppx: Protonix 40mg IV daily  #Diet: tube feeds  #Activity: IAT   #Code Status: Full Code  #Dispo: from Group Home ([facility]), SDU for now, acute

## 2024-01-25 NOTE — PROGRESS NOTE ADULT - SUBJECTIVE AND OBJECTIVE BOX
Patient is a 58y old  Female who presents with a chief complaint of Sepsis        SUBJECTIVE / OVERNIGHT EVENTS: Pt following me with her eyes and head. She is alert. Spoke to her community advisory board and they would like her to be more alert before repeating an SLP eval.   ADDITIONAL REVIEW OF SYSTEMS: as above    MEDICATIONS  (STANDING):  albuterol    90 MICROgram(s) HFA Inhaler 2 Puff(s) Inhalation two times a day  albuterol/ipratropium for Nebulization 3 milliLiter(s) Nebulizer every 6 hours  apixaban 5 milliGRAM(s) Oral two times a day  artificial  tears Solution 1 Drop(s) Both EYES two times a day  calcium carbonate   1250 mG (OsCal) 1 Tablet(s) Oral two times a day  chlorhexidine 2% Cloths 1 Application(s) Topical daily  gabapentin 300 milliGRAM(s) Oral every 12 hours  levETIRAcetam  Solution 500 milliGRAM(s) Oral every 12 hours  melatonin 3 milliGRAM(s) Oral at bedtime  meropenem  IVPB 1000 milliGRAM(s) IV Intermittent every 8 hours  midodrine. 10 milliGRAM(s) Oral every 8 hours  pantoprazole  Injectable 40 milliGRAM(s) IV Push every 24 hours  polyethylene glycol 3350 17 Gram(s) Oral at bedtime  raloxifene 60 milliGRAM(s) Oral daily  senna 2 Tablet(s) Oral at bedtime  silver sulfADIAZINE 1% Cream 1 Application(s) Topical two times a day    MEDICATIONS  (PRN):  acetaminophen     Tablet .. 650 milliGRAM(s) Oral every 6 hours PRN Temp greater or equal to 38C (100.4F), Mild Pain (1 - 3)  aluminum hydroxide/magnesium hydroxide/simethicone Suspension 30 milliLiter(s) Oral every 4 hours PRN Dyspepsia  ondansetron Injectable 4 milliGRAM(s) IV Push every 8 hours PRN Nausea and/or Vomiting      CAPILLARY BLOOD GLUCOSE        I&O's Summary    23 Jan 2024 07:01  -  24 Jan 2024 07:00  --------------------------------------------------------  IN: 50 mL / OUT: 40 mL / NET: 10 mL    24 Jan 2024 07:01  -  24 Jan 2024 18:17  --------------------------------------------------------  IN: 50 mL / OUT: 55 mL / NET: -5 mL        PHYSICAL EXAM:  Vital Signs Last 24 Hrs  T(C): 36.8 (24 Jan 2024 12:00), Max: 36.8 (24 Jan 2024 12:00)  T(F): 98.3 (24 Jan 2024 12:00), Max: 98.3 (24 Jan 2024 12:00)  HR: 112 (24 Jan 2024 15:04) (88 - 117)  BP: 115/55 (24 Jan 2024 15:04) (99/50 - 160/66)  BP(mean): --  RR: 18 (24 Jan 2024 12:00) (18 - 27)  SpO2: 95% (24 Jan 2024 05:00) (95% - 97%)    Parameters below as of 24 Jan 2024 05:00  Patient On (Oxygen Delivery Method): nasal cannula  O2 Flow (L/min): 2    CONSTITUTIONAL: Shaking  ENMT: dry oral mucosa, no pharyngeal injection or exudates; NGT in place  NECK: Supple, no palpable masses; no thyromegaly  RESPIRATORY: Normal respiratory effort; mild rhonchi in b/l bases  CARDIOVASCULAR: Regular rate and rhythm, normal S1 and S2, no murmur/rub/gallop; Peripheral pulses are 2+ bilaterally  ABDOMEN: difficult to examine due to contracted lower extremities  MUSCULOSKELETAL: contracted extremities, Complete Rt 1st and partial 2nd toe amputation  PSYCH: non verbal, developmental delays  SKIN: stage 1 sacral wound at gluteal folds    LABS:                        8.9    3.10  )-----------( 269      ( 24 Jan 2024 07:57 )             28.8     01-24    140  |  99  |  13  ----------------------------<  106<H>  3.8   |  30  |  0.6<L>    Ca    8.1<L>      24 Jan 2024 07:57  Mg     2.3     01-24    TPro  6.0  /  Alb  2.9<L>  /  TBili  <0.2  /  DBili  x   /  AST  29  /  ALT  19  /  AlkPhos  69  01-24          Urinalysis Basic - ( 24 Jan 2024 07:57 )    Color: x / Appearance: x / SG: x / pH: x  Gluc: 106 mg/dL / Ketone: x  / Bili: x / Urobili: x   Blood: x / Protein: x / Nitrite: x   Leuk Esterase: x / RBC: x / WBC x   Sq Epi: x / Non Sq Epi: x / Bacteria: x        Culture - Blood (collected 22 Jan 2024 16:51)  Source: .Blood None  Preliminary Report (24 Jan 2024 01:04):    No growth at 24 hours        RADIOLOGY & ADDITIONAL TESTS:  Results Reviewed:   Imaging Personally Reviewed:  Electrocardiogram Personally Reviewed:    COORDINATION OF CARE:  Care Discussed with Consultants/Other Providers [Y/N]:  Prior or Outpatient Records Reviewed [Y/N]:

## 2024-01-26 ENCOUNTER — APPOINTMENT (OUTPATIENT)
Dept: GASTROENTEROLOGY | Facility: CLINIC | Age: 59
End: 2024-01-26

## 2024-01-26 LAB
ANION GAP SERPL CALC-SCNC: 9 MMOL/L — SIGNIFICANT CHANGE UP (ref 7–14)
BUN SERPL-MCNC: 22 MG/DL — HIGH (ref 10–20)
CALCIUM SERPL-MCNC: 8 MG/DL — LOW (ref 8.4–10.4)
CHLORIDE SERPL-SCNC: 106 MMOL/L — SIGNIFICANT CHANGE UP (ref 98–110)
CO2 SERPL-SCNC: 30 MMOL/L — SIGNIFICANT CHANGE UP (ref 17–32)
CREAT SERPL-MCNC: 0.5 MG/DL — LOW (ref 0.7–1.5)
EGFR: 109 ML/MIN/1.73M2 — SIGNIFICANT CHANGE UP
GLUCOSE SERPL-MCNC: 119 MG/DL — HIGH (ref 70–99)
HCT VFR BLD CALC: 30.5 % — LOW (ref 37–47)
HGB BLD-MCNC: 9.1 G/DL — LOW (ref 12–16)
MAGNESIUM SERPL-MCNC: 2.4 MG/DL — SIGNIFICANT CHANGE UP (ref 1.8–2.4)
MCHC RBC-ENTMCNC: 24.9 PG — LOW (ref 27–31)
MCHC RBC-ENTMCNC: 29.8 G/DL — LOW (ref 32–37)
MCV RBC AUTO: 83.3 FL — SIGNIFICANT CHANGE UP (ref 81–99)
NRBC # BLD: 0 /100 WBCS — SIGNIFICANT CHANGE UP (ref 0–0)
PLATELET # BLD AUTO: 350 K/UL — SIGNIFICANT CHANGE UP (ref 130–400)
PMV BLD: 10.5 FL — HIGH (ref 7.4–10.4)
POTASSIUM SERPL-MCNC: 3.8 MMOL/L — SIGNIFICANT CHANGE UP (ref 3.5–5)
POTASSIUM SERPL-SCNC: 3.8 MMOL/L — SIGNIFICANT CHANGE UP (ref 3.5–5)
RBC # BLD: 3.66 M/UL — LOW (ref 4.2–5.4)
RBC # FLD: 18.9 % — HIGH (ref 11.5–14.5)
SODIUM SERPL-SCNC: 145 MMOL/L — SIGNIFICANT CHANGE UP (ref 135–146)
WBC # BLD: 4.47 K/UL — LOW (ref 4.8–10.8)
WBC # FLD AUTO: 4.47 K/UL — LOW (ref 4.8–10.8)

## 2024-01-26 PROCEDURE — 99232 SBSQ HOSP IP/OBS MODERATE 35: CPT

## 2024-01-26 RX ADMIN — Medication 1 TABLET(S): at 05:08

## 2024-01-26 RX ADMIN — MIDODRINE HYDROCHLORIDE 10 MILLIGRAM(S): 2.5 TABLET ORAL at 14:30

## 2024-01-26 RX ADMIN — RALOXIFENE HYDROCHLORIDE 60 MILLIGRAM(S): 60 TABLET, COATED ORAL at 11:29

## 2024-01-26 RX ADMIN — APIXABAN 5 MILLIGRAM(S): 2.5 TABLET, FILM COATED ORAL at 18:19

## 2024-01-26 RX ADMIN — APIXABAN 5 MILLIGRAM(S): 2.5 TABLET, FILM COATED ORAL at 05:06

## 2024-01-26 RX ADMIN — MIDODRINE HYDROCHLORIDE 10 MILLIGRAM(S): 2.5 TABLET ORAL at 21:48

## 2024-01-26 RX ADMIN — Medication 1 APPLICATION(S): at 05:08

## 2024-01-26 RX ADMIN — MEROPENEM 100 MILLIGRAM(S): 1 INJECTION INTRAVENOUS at 21:48

## 2024-01-26 RX ADMIN — GABAPENTIN 300 MILLIGRAM(S): 400 CAPSULE ORAL at 05:06

## 2024-01-26 RX ADMIN — CHLORHEXIDINE GLUCONATE 1 APPLICATION(S): 213 SOLUTION TOPICAL at 18:14

## 2024-01-26 RX ADMIN — PANTOPRAZOLE SODIUM 40 MILLIGRAM(S): 20 TABLET, DELAYED RELEASE ORAL at 05:06

## 2024-01-26 RX ADMIN — Medication 25 MILLIGRAM(S): at 18:06

## 2024-01-26 RX ADMIN — GABAPENTIN 300 MILLIGRAM(S): 400 CAPSULE ORAL at 18:21

## 2024-01-26 RX ADMIN — Medication 1 TABLET(S): at 18:07

## 2024-01-26 RX ADMIN — MEROPENEM 100 MILLIGRAM(S): 1 INJECTION INTRAVENOUS at 05:05

## 2024-01-26 RX ADMIN — Medication 3 MILLIGRAM(S): at 21:48

## 2024-01-26 RX ADMIN — MEROPENEM 100 MILLIGRAM(S): 1 INJECTION INTRAVENOUS at 14:31

## 2024-01-26 RX ADMIN — Medication 1 DROP(S): at 18:21

## 2024-01-26 RX ADMIN — LEVETIRACETAM 500 MILLIGRAM(S): 250 TABLET, FILM COATED ORAL at 18:06

## 2024-01-26 RX ADMIN — Medication 1 APPLICATION(S): at 18:14

## 2024-01-26 RX ADMIN — Medication 1 DROP(S): at 05:08

## 2024-01-26 RX ADMIN — MIDODRINE HYDROCHLORIDE 10 MILLIGRAM(S): 2.5 TABLET ORAL at 05:06

## 2024-01-26 RX ADMIN — Medication 25 MILLIGRAM(S): at 05:06

## 2024-01-26 RX ADMIN — LEVETIRACETAM 500 MILLIGRAM(S): 250 TABLET, FILM COATED ORAL at 05:06

## 2024-01-26 RX ADMIN — Medication 3 MILLILITER(S): at 15:18

## 2024-01-26 RX ADMIN — Medication 3 MILLILITER(S): at 20:09

## 2024-01-26 RX ADMIN — Medication 3 MILLILITER(S): at 07:53

## 2024-01-26 NOTE — CONSULT NOTE ADULT - SUBJECTIVE AND OBJECTIVE BOX
HPI:  58F w/ PMHx Down Syndrome, nonverbal at baseline, Hypothyroidism, Cerebral palsy and Seizure Disorders presents to the ED from nursing facility/group home ([facility]) for respiratory distress and high grade fever. Patient found to be septic on admission. As per aide at bedside, patient had been coughing and congested for 3x days. Denies fevers, chills, n/v/d or pain prior to admission. Patient is on a puree diet at baseline.     Vitals: Temp 104.5F (rectal), /74, , RR 24, SpO2 100% on BiPAP  Labs: Hgb 9.5 (previously 11.1), Platelet 422, INR 1.43, VBG Lactate 2.1  Imaging: CXR shows possible RML infiltrate  In the ED:  - s/p Cefepime 2g IV x1  - s/p Levaquin 750mg IV x1  - s/p 2L LR bolus    Admitted to SDU for management of acute respiratory distress 2/2 CAP/Aspiration PNA     Pt was evaluated at bedside with aide. According to the aide, pt has hx of DVT and is on Eliquis BID. Aide did not notice any active bleeding or bruising.      Allergies  No Known Allergies    MEDICATIONS  (STANDING):  albuterol    90 MICROgram(s) HFA Inhaler 2 Puff(s) Inhalation two times a day  albuterol/ipratropium for Nebulization 3 milliLiter(s) Nebulizer every 6 hours  apixaban 5 milliGRAM(s) Oral two times a day  artificial  tears Solution 1 Drop(s) Both EYES two times a day  calcium carbonate   1250 mG (OsCal) 1 Tablet(s) Oral two times a day  chlorhexidine 2% Cloths 1 Application(s) Topical daily  gabapentin 300 milliGRAM(s) Oral every 12 hours  levETIRAcetam  Solution 500 milliGRAM(s) Oral every 12 hours  melatonin 3 milliGRAM(s) Oral at bedtime  meropenem  IVPB 1000 milliGRAM(s) IV Intermittent every 8 hours  metoprolol tartrate 25 milliGRAM(s) Oral every 12 hours  midodrine. 10 milliGRAM(s) Oral every 8 hours  pantoprazole  Injectable 40 milliGRAM(s) IV Push every 24 hours  polyethylene glycol 3350 17 Gram(s) Oral at bedtime  raloxifene 60 milliGRAM(s) Oral daily  senna 2 Tablet(s) Oral at bedtime  silver sulfADIAZINE 1% Cream 1 Application(s) Topical two times a day    MEDICATIONS  (PRN):  acetaminophen     Tablet .. 650 milliGRAM(s) Oral every 6 hours PRN Temp greater or equal to 38C (100.4F), Mild Pain (1 - 3)  aluminum hydroxide/magnesium hydroxide/simethicone Suspension 30 milliLiter(s) Oral every 4 hours PRN Dyspepsia  ondansetron Injectable 4 milliGRAM(s) IV Push every 8 hours PRN Nausea and/or Vomiting      PAST MEDICAL & SURGICAL HISTORY:  Down syndrome  Osteoporosis  Mild anemia  Neuropathy  S/P debridement of R hip on 3/2/21    SOCIAL HISTORY: No EtOH, no tobacco    REVIEW OF SYSTEMS (per aide):  CONSTITUTIONAL: + fevers, denies chills or weakness   EYES/ENT: No visual changes or throat pain   NECK: No pain or stiffness  RESPIRATORY: +wheezing, denies hemoptysis or shortness of breath  CARDIOVASCULAR: No chest pain or palpitations  GASTROINTESTINAL: No abdominal or epigastric pain, no N/V/D, melena or hematochezia  GENITOURINARY: No dysuria, frequency or hematuria  NEUROLOGICAL: No numbness or weakness  SKIN: No itching, burning, rashes, or lesions   All other review of systems is negative unless indicated above.    T(F): 97.1 (01-26-24 @ 06:15), Max: 98.8 (01-25-24 @ 12:00)  HR: 100 (01-26-24 @ 06:56)  BP: 129/79 (01-26-24 @ 06:15)  RR: 18 (01-26-24 @ 06:15)  SpO2: 96% (01-25-24 @ 21:10)    PHYSICAL EXAM:   GENERAL: NAD, laying in bed comfortably   HEAD: Atraumatic  EYES: EOMI, PERRLA, conjunctiva and sclera clear  NECK: Supple, no JVD  CHEST/LUNG: + bilateral wheezing and crackles    HEART: Regular rate and rhythm, no murmurs, rubs, or gallops  ABDOMEN: Soft, nontender, nondistended  EXTREMITIES:  2+ Peripheral Pulses, no clubbing, cyanosis, or edema  NEUROLOGY: nonverbal at baseline   SKIN: No rashes or lesions                        9.1    4.47  )-----------( 350      ( 26 Jan 2024 07:01 )             30.5     145  |  106  |  22<H>  ----------------------------<  119<H>  3.8   |  30  |  0.5<L>    Ca    8.0<L>      26 Jan 2024 07:01  Mg     2.4     01-26    TPro  6.5  /  Alb  2.9<L>  /  TBili  <0.2  /  DBili  x   /  AST  23  /  ALT  20  /  AlkPhos  73  01-25    Magnesium: 2.4 mg/dL (01-26 @ 07:01)    RADIOLOGY:     Xray Chest 1/24/24  IMPRESSION:    Bilateral pneumonia.  Worsening.  Support devices as described.    Xray Chest 1/25/24  IMPRESSION:    Bilateral opacifications without difference. Support devices as described.    VA Duplex Lower Ext Vein Scan, Bilat 1/22/24  IMPRESSION:    No evidence of deep venous thrombosis in either lower extremity.

## 2024-01-26 NOTE — PROGRESS NOTE ADULT - ASSESSMENT
58F w/ PMHx Down Syndrome, nonverbal at baseline, DVT on eliquis 5mg BID,  Hypothyroidism, aspiration pneumonia in prev admissions, Cerebral palsy and Seizure Disorders presents to the ED from nursing facility/group home ([facility]) presented to ED for respiratory distress and high grade fever. Patient found to be septic on admission. Admitted for management of acute respiratory distress likely 2/2 CAP or aspiration PNA.    #Sepsis POA(fever, tachy and leukocytosis)  #Acute hypoxic resp failure 2/2 Aspiration PNA or  RSV bronchiolitis  #h/o Dysphagia - Puree Diet at baseline  - VBG Lactate 2.1 improved to wnl, procal 0.04 pH wnl and CO2 mildly elevated 61  - s/p 2L LR bolus  - previously fungitell +ve s/p Caspofungin on previous admission  - CXR shows- stable b/l pulmonary opacities  - s/p Cefepime 2g IV x1, Levaquin 750mg IV x1, Zosyn 3.375mgv x 1   - c/w meropenem 1g q8 as per ID recs  - MRSA neg - vanc dcd (1/22)  - BCx (1/20): Staph hominis repeat BCx have been NGTD  - UCx: neg  - RVP- RSV detected  - MRSA swab neg, urine strep neg, urine legionella neg, fungitell positive at 325  - d-dimer 605 and LE duplex neg  - aspiration precautions  - NG tube placement   - tube feeds initiated  - SLP: npo by mouth  - pt still tachycardic with decreased midodrine, so resumed midodrine 10mg tid and added lopressor 25mg bid  - increased FWF  - f/u on tube feeds with RD   - resume eliquis via NGT  - spoke to the pt's advisory board and they would like to repeat an SLP eval in a few days    #Elevated Troponin (stable at 35)  - Trop 25>37>35>35  - EKG shows tachycardia, p waves present, likely sinus tach w/ a lot of artifact from shaking    #Acute on Chronic Normocytic Anemia (stable)  - Hgb 8.9 (previously 9.5)  - INR 1.43  - no evidence of hemorrhage  - b12 normal, folate normal, iron studies(ferritin 128)  - monitor H&H  - transfuse PRN, keep hgb >7    #h/o DVT of L Common Femoral Vein  - on Eliquis 5mg BID at home  - LE duplex neg    #Seizure Disorder  - on IV Keppra 500mg BID      #Hypothyroidism  - not on any medications at home  - TSH- 0.51    #h/o Right Foot OM (1st toe stump and 2nd distal phalanx)  #h/o Multiple Left Foot deep tissue injuries  #h/o Sacral Wound  - Patient underwent excisional debridement of ulcer of right foot (including 1st metatarsal) and partial amputation 2nd toe on previous admissions  - past wound cultures grew Proteus and ESBL E coli  - reposition q2hrs to prevent pressure injury  - local wound care  - no acute surgical interventions from podiatry and burn    #Down Syndrome  #Cerebral Palsy  #Non-Verbal    #DVT ppx: on Eliquis 5mg PO BID   #GI ppx: Protonix 40mg IV daily  #Diet: tube feeds  #Activity: IAT   #Code Status: Full Code  #Dispo: from Group Home ([facility]), SDU for now, acute

## 2024-01-26 NOTE — PROGRESS NOTE ADULT - SUBJECTIVE AND OBJECTIVE BOX
Patient is a 58y old  Female who presents with a chief complaint of Sepsis        SUBJECTIVE / OVERNIGHT EVENTS: Pt is doing well. Seems very calm this am, possibly because an aide from the  is next to bed. He feels like she is more alert.  ADDITIONAL REVIEW OF SYSTEMS: as above    MEDICATIONS  (STANDING):  albuterol    90 MICROgram(s) HFA Inhaler 2 Puff(s) Inhalation two times a day  albuterol/ipratropium for Nebulization 3 milliLiter(s) Nebulizer every 6 hours  apixaban 5 milliGRAM(s) Oral two times a day  artificial  tears Solution 1 Drop(s) Both EYES two times a day  calcium carbonate   1250 mG (OsCal) 1 Tablet(s) Oral two times a day  chlorhexidine 2% Cloths 1 Application(s) Topical daily  gabapentin 300 milliGRAM(s) Oral every 12 hours  levETIRAcetam  Solution 500 milliGRAM(s) Oral every 12 hours  melatonin 3 milliGRAM(s) Oral at bedtime  meropenem  IVPB 1000 milliGRAM(s) IV Intermittent every 8 hours  midodrine. 10 milliGRAM(s) Oral every 8 hours  pantoprazole  Injectable 40 milliGRAM(s) IV Push every 24 hours  polyethylene glycol 3350 17 Gram(s) Oral at bedtime  raloxifene 60 milliGRAM(s) Oral daily  senna 2 Tablet(s) Oral at bedtime  silver sulfADIAZINE 1% Cream 1 Application(s) Topical two times a day    MEDICATIONS  (PRN):  acetaminophen     Tablet .. 650 milliGRAM(s) Oral every 6 hours PRN Temp greater or equal to 38C (100.4F), Mild Pain (1 - 3)  aluminum hydroxide/magnesium hydroxide/simethicone Suspension 30 milliLiter(s) Oral every 4 hours PRN Dyspepsia  ondansetron Injectable 4 milliGRAM(s) IV Push every 8 hours PRN Nausea and/or Vomiting      CAPILLARY BLOOD GLUCOSE        I&O's Summary    23 Jan 2024 07:01  -  24 Jan 2024 07:00  --------------------------------------------------------  IN: 50 mL / OUT: 40 mL / NET: 10 mL    24 Jan 2024 07:01  -  24 Jan 2024 18:17  --------------------------------------------------------  IN: 50 mL / OUT: 55 mL / NET: -5 mL        PHYSICAL EXAM:  Vital Signs Last 24 Hrs  T(C): 36.8 (24 Jan 2024 12:00), Max: 36.8 (24 Jan 2024 12:00)  T(F): 98.3 (24 Jan 2024 12:00), Max: 98.3 (24 Jan 2024 12:00)  HR: 112 (24 Jan 2024 15:04) (88 - 117)  BP: 115/55 (24 Jan 2024 15:04) (99/50 - 160/66)  BP(mean): --  RR: 18 (24 Jan 2024 12:00) (18 - 27)  SpO2: 95% (24 Jan 2024 05:00) (95% - 97%)    Parameters below as of 24 Jan 2024 05:00  Patient On (Oxygen Delivery Method): nasal cannula  O2 Flow (L/min): 2    CONSTITUTIONAL: Shaking  ENMT: dry oral mucosa, no pharyngeal injection or exudates; NGT in place  NECK: Supple, no palpable masses; no thyromegaly  RESPIRATORY: Normal respiratory effort; mild rhonchi in b/l bases  CARDIOVASCULAR: Regular rate and rhythm, normal S1 and S2, no murmur/rub/gallop; Peripheral pulses are 2+ bilaterally  ABDOMEN: difficult to examine due to contracted lower extremities  MUSCULOSKELETAL: contracted extremities, Complete Rt 1st and partial 2nd toe amputation  PSYCH: non verbal, developmental delays  SKIN: stage 1 sacral wound at gluteal folds    LABS:                        8.9    3.10  )-----------( 269      ( 24 Jan 2024 07:57 )             28.8     01-24    140  |  99  |  13  ----------------------------<  106<H>  3.8   |  30  |  0.6<L>    Ca    8.1<L>      24 Jan 2024 07:57  Mg     2.3     01-24    TPro  6.0  /  Alb  2.9<L>  /  TBili  <0.2  /  DBili  x   /  AST  29  /  ALT  19  /  AlkPhos  69  01-24          Urinalysis Basic - ( 24 Jan 2024 07:57 )    Color: x / Appearance: x / SG: x / pH: x  Gluc: 106 mg/dL / Ketone: x  / Bili: x / Urobili: x   Blood: x / Protein: x / Nitrite: x   Leuk Esterase: x / RBC: x / WBC x   Sq Epi: x / Non Sq Epi: x / Bacteria: x        Culture - Blood (collected 22 Jan 2024 16:51)  Source: .Blood None  Preliminary Report (24 Jan 2024 01:04):    No growth at 24 hours        RADIOLOGY & ADDITIONAL TESTS:  Results Reviewed:   Imaging Personally Reviewed:  Electrocardiogram Personally Reviewed:    COORDINATION OF CARE:  Care Discussed with Consultants/Other Providers [Y/N]:  Prior or Outpatient Records Reviewed [Y/N]:

## 2024-01-26 NOTE — CONSULT NOTE ADULT - ASSESSMENT
58F w/ PMHx Down Syndrome, nonverbal at baseline, Hypothyroidism, Cerebral palsy and Seizure Disorders presents to the ED from nursing facility/group home ([facility] for respiratory distress and high grade fever. Patient found to be septic on admission. As per aide at bedside, patient had been coughing and congested for 3x days. Denies fevers, chills, n/v/d or pain prior to admission. Patient is on a puree diet at baseline. Per aide, pt has hx of DVT and is on Eliquis BID at home, denies any active bleeding or bruising.     Heme/onc was consulted because pt was found to have pancytopenia with myelocytes on peripheral blood.      #Acute on Chronic Normocytic Anemia w/ leukopenia 2/2 infection   - Hgb 9.1 (~11 at baseline)  - Iron studies: B12: 389, folate: >20, ferritin: 128  - no evidence of bleeding   - WBC: 4.7, RBC: 3.66, platelet: 350, myelocytes: 0.9%   - Chest x-ray: bilateral pneumonia    #h/o DVT of L Common Femoral Vein   - on Eliquis 5mg BID at home  - LE duplex neg on this admission     PLAN:   - monitor H&H  - transfuse PRN, keep hgb >7  - c/w antibiotics   - c/w Eliquis 5mg     58F w/ PMHx Down Syndrome, nonverbal at baseline, Hypothyroidism, Cerebral palsy and Seizure Disorders presents to the ED from nursing facility/group home ([facility]) for respiratory distress and high grade fever. Patient found to be septic on admission. As per aide at bedside, patient had been coughing and congested for 3x days. Denies fevers, chills, n/v/d or pain prior to admission. Patient is on a puree diet at baseline. Per aide, pt has hx of DVT and is on Eliquis BID at home, denies any active bleeding or bruising.     Heme/onc was consulted because pt was found to have pancytopenia with myelocytes on peripheral blood.      # Acute on chronic Normocytic Anemia   # Mild leucopenia with no neutropenia  - Hgb 9.1 (~11 at baseline)  - Iron studies: B12: 389, folate: >20, ferritin: 128  - no evidence of bleeding   - WBC: 4.7, RBC: 3.66, platelet: 350, myelocytes: 0.9%   - Chest x-ray: bilateral pneumonia    #h/o DVT of L Common Femoral Vein   - on Eliquis 5mg BID at home  - LE duplex neg on this admission     Plan:  -   - monitor H&H  - transfuse PRN, keep hgb >7  - c/w antibiotics   - c/w Eliquis 5mg     58F w/ PMHx Down Syndrome, nonverbal at baseline, Hypothyroidism, Cerebral palsy and Seizure Disorders presents to the ED from nursing facility/group home ([facility]) for respiratory distress and high grade fever. Patient found to be septic on admission. As per aide at bedside, patient had been coughing and congested for 3x days. Denies fevers, chills, n/v/d or pain prior to admission. Patient is on a puree diet at baseline. Per aide, pt has hx of DVT and is on Eliquis BID at home, denies any active bleeding or bruising.     Heme/onc was consulted for pancytopenia and myelocytes on peripheral smear      # Acute on chronic Normocytic Anemia   # Mild leucopenia with no neutropenia  - Hgb 9.1 (~11 at baseline)  - Iron studies: B12: 389, folate: >20, ferritin: 128  - no evidence of bleeding   - WBC: 4.7, neutrophilic predominance  - platelet count normal  - Chest x-ray: bilateral pneumonia    #h/o DVT of L Common Femoral Vein   - on Eliquis 5mg BID at home  - LE duplex neg on this admission     Plan:  - Anemia and mild leucopenia in setting of infection. Normal platelets count, no evidence of pancytopenia. Unlikely bone marrow disorder  - monitor H&H  - transfuse PRN, keep hgb >7  - c/w antibiotics   - c/w Eliquis 5mg  - Rest of care as per primary team

## 2024-01-27 LAB
ALBUMIN SERPL ELPH-MCNC: 3 G/DL — LOW (ref 3.5–5.2)
ALP SERPL-CCNC: 85 U/L — SIGNIFICANT CHANGE UP (ref 30–115)
ALT FLD-CCNC: 77 U/L — HIGH (ref 0–41)
ANION GAP SERPL CALC-SCNC: 12 MMOL/L — SIGNIFICANT CHANGE UP (ref 7–14)
AST SERPL-CCNC: 58 U/L — HIGH (ref 0–41)
BASOPHILS # BLD AUTO: 0.03 K/UL — SIGNIFICANT CHANGE UP (ref 0–0.2)
BASOPHILS NFR BLD AUTO: 0.5 % — SIGNIFICANT CHANGE UP (ref 0–1)
BILIRUB SERPL-MCNC: <0.2 MG/DL — SIGNIFICANT CHANGE UP (ref 0.2–1.2)
BUN SERPL-MCNC: 18 MG/DL — SIGNIFICANT CHANGE UP (ref 10–20)
CALCIUM SERPL-MCNC: 8.3 MG/DL — LOW (ref 8.4–10.5)
CHLORIDE SERPL-SCNC: 104 MMOL/L — SIGNIFICANT CHANGE UP (ref 98–110)
CO2 SERPL-SCNC: 27 MMOL/L — SIGNIFICANT CHANGE UP (ref 17–32)
CREAT SERPL-MCNC: 0.6 MG/DL — LOW (ref 0.7–1.5)
EGFR: 105 ML/MIN/1.73M2 — SIGNIFICANT CHANGE UP
EOSINOPHIL # BLD AUTO: 0.21 K/UL — SIGNIFICANT CHANGE UP (ref 0–0.7)
EOSINOPHIL NFR BLD AUTO: 3.5 % — SIGNIFICANT CHANGE UP (ref 0–8)
GLUCOSE BLDC GLUCOMTR-MCNC: 113 MG/DL — HIGH (ref 70–99)
GLUCOSE BLDC GLUCOMTR-MCNC: 183 MG/DL — HIGH (ref 70–99)
GLUCOSE SERPL-MCNC: 158 MG/DL — HIGH (ref 70–99)
HCT VFR BLD CALC: 32 % — LOW (ref 37–47)
HGB BLD-MCNC: 9.6 G/DL — LOW (ref 12–16)
IMM GRANULOCYTES NFR BLD AUTO: 0.5 % — HIGH (ref 0.1–0.3)
LYMPHOCYTES # BLD AUTO: 1.54 K/UL — SIGNIFICANT CHANGE UP (ref 1.2–3.4)
LYMPHOCYTES # BLD AUTO: 25.4 % — SIGNIFICANT CHANGE UP (ref 20.5–51.1)
MAGNESIUM SERPL-MCNC: 2.6 MG/DL — HIGH (ref 1.8–2.4)
MCHC RBC-ENTMCNC: 24.4 PG — LOW (ref 27–31)
MCHC RBC-ENTMCNC: 30 G/DL — LOW (ref 32–37)
MCV RBC AUTO: 81.2 FL — SIGNIFICANT CHANGE UP (ref 81–99)
MONOCYTES # BLD AUTO: 0.44 K/UL — SIGNIFICANT CHANGE UP (ref 0.1–0.6)
MONOCYTES NFR BLD AUTO: 7.2 % — SIGNIFICANT CHANGE UP (ref 1.7–9.3)
NEUTROPHILS # BLD AUTO: 3.82 K/UL — SIGNIFICANT CHANGE UP (ref 1.4–6.5)
NEUTROPHILS NFR BLD AUTO: 62.9 % — SIGNIFICANT CHANGE UP (ref 42.2–75.2)
NRBC # BLD: 0 /100 WBCS — SIGNIFICANT CHANGE UP (ref 0–0)
PLATELET # BLD AUTO: 426 K/UL — HIGH (ref 130–400)
PMV BLD: 10.7 FL — HIGH (ref 7.4–10.4)
POTASSIUM SERPL-MCNC: 4.8 MMOL/L — SIGNIFICANT CHANGE UP (ref 3.5–5)
POTASSIUM SERPL-SCNC: 4.8 MMOL/L — SIGNIFICANT CHANGE UP (ref 3.5–5)
PROT SERPL-MCNC: 6.4 G/DL — SIGNIFICANT CHANGE UP (ref 6–8)
RBC # BLD: 3.94 M/UL — LOW (ref 4.2–5.4)
RBC # FLD: 19.5 % — HIGH (ref 11.5–14.5)
SODIUM SERPL-SCNC: 143 MMOL/L — SIGNIFICANT CHANGE UP (ref 135–146)
WBC # BLD: 6.07 K/UL — SIGNIFICANT CHANGE UP (ref 4.8–10.8)
WBC # FLD AUTO: 6.07 K/UL — SIGNIFICANT CHANGE UP (ref 4.8–10.8)

## 2024-01-27 PROCEDURE — 74018 RADEX ABDOMEN 1 VIEW: CPT | Mod: 26

## 2024-01-27 PROCEDURE — 99232 SBSQ HOSP IP/OBS MODERATE 35: CPT

## 2024-01-27 RX ORDER — GUAIFENESIN/DEXTROMETHORPHAN 600MG-30MG
10 TABLET, EXTENDED RELEASE 12 HR ORAL
Refills: 0 | Status: COMPLETED | OUTPATIENT
Start: 2024-01-27 | End: 2024-01-30

## 2024-01-27 RX ADMIN — LEVETIRACETAM 500 MILLIGRAM(S): 250 TABLET, FILM COATED ORAL at 18:03

## 2024-01-27 RX ADMIN — GABAPENTIN 300 MILLIGRAM(S): 400 CAPSULE ORAL at 06:39

## 2024-01-27 RX ADMIN — Medication 3 MILLILITER(S): at 19:37

## 2024-01-27 RX ADMIN — Medication 3 MILLILITER(S): at 04:43

## 2024-01-27 RX ADMIN — Medication 1 APPLICATION(S): at 06:41

## 2024-01-27 RX ADMIN — Medication 1 TABLET(S): at 06:39

## 2024-01-27 RX ADMIN — Medication 1 TABLET(S): at 18:02

## 2024-01-27 RX ADMIN — PANTOPRAZOLE SODIUM 40 MILLIGRAM(S): 20 TABLET, DELAYED RELEASE ORAL at 06:38

## 2024-01-27 RX ADMIN — Medication 25 MILLIGRAM(S): at 18:02

## 2024-01-27 RX ADMIN — Medication 3 MILLILITER(S): at 14:15

## 2024-01-27 RX ADMIN — POLYETHYLENE GLYCOL 3350 17 GRAM(S): 17 POWDER, FOR SOLUTION ORAL at 22:09

## 2024-01-27 RX ADMIN — Medication 1 DROP(S): at 18:03

## 2024-01-27 RX ADMIN — Medication 3 MILLILITER(S): at 08:09

## 2024-01-27 RX ADMIN — CHLORHEXIDINE GLUCONATE 1 APPLICATION(S): 213 SOLUTION TOPICAL at 11:19

## 2024-01-27 RX ADMIN — Medication 10 MILLILITER(S): at 18:03

## 2024-01-27 RX ADMIN — Medication 3 MILLIGRAM(S): at 22:09

## 2024-01-27 RX ADMIN — Medication 1 APPLICATION(S): at 18:03

## 2024-01-27 RX ADMIN — GABAPENTIN 300 MILLIGRAM(S): 400 CAPSULE ORAL at 18:01

## 2024-01-27 RX ADMIN — RALOXIFENE HYDROCHLORIDE 60 MILLIGRAM(S): 60 TABLET, COATED ORAL at 11:15

## 2024-01-27 RX ADMIN — MIDODRINE HYDROCHLORIDE 10 MILLIGRAM(S): 2.5 TABLET ORAL at 22:09

## 2024-01-27 RX ADMIN — MIDODRINE HYDROCHLORIDE 10 MILLIGRAM(S): 2.5 TABLET ORAL at 06:39

## 2024-01-27 RX ADMIN — SENNA PLUS 2 TABLET(S): 8.6 TABLET ORAL at 22:09

## 2024-01-27 RX ADMIN — Medication 10 MILLILITER(S): at 23:36

## 2024-01-27 RX ADMIN — MEROPENEM 100 MILLIGRAM(S): 1 INJECTION INTRAVENOUS at 06:38

## 2024-01-27 RX ADMIN — APIXABAN 5 MILLIGRAM(S): 2.5 TABLET, FILM COATED ORAL at 06:39

## 2024-01-27 RX ADMIN — MIDODRINE HYDROCHLORIDE 10 MILLIGRAM(S): 2.5 TABLET ORAL at 13:23

## 2024-01-27 RX ADMIN — LEVETIRACETAM 500 MILLIGRAM(S): 250 TABLET, FILM COATED ORAL at 06:38

## 2024-01-27 RX ADMIN — MEROPENEM 100 MILLIGRAM(S): 1 INJECTION INTRAVENOUS at 13:21

## 2024-01-27 RX ADMIN — APIXABAN 5 MILLIGRAM(S): 2.5 TABLET, FILM COATED ORAL at 18:02

## 2024-01-27 RX ADMIN — Medication 25 MILLIGRAM(S): at 06:40

## 2024-01-27 RX ADMIN — Medication 10 MILLILITER(S): at 13:21

## 2024-01-27 NOTE — PROGRESS NOTE ADULT - ASSESSMENT
58F w/ PMHx Down Syndrome, nonverbal at baseline, DVT on eliquis 5mg BID,  Hypothyroidism, aspiration pneumonia in prev admissions, Cerebral palsy and Seizure Disorders presents to the ED from nursing facility/group home ([facility]) presented to ED for respiratory distress and high grade fever. Patient found to be septic on admission. Admitted for management of acute respiratory distress likely 2/2 CAP or aspiration PNA.    #Sepsis POA(fever, tachy and leukocytosis)  #Acute hypoxic resp failure 2/2 Aspiration PNA or  RSV bronchiolitis  #h/o Dysphagia - Puree Diet at baseline  - VBG Lactate 2.1 improved to wnl, procal 0.04 pH wnl and CO2 mildly elevated 61  - s/p 2L LR bolus  - previously fungitell +ve s/p Caspofungin on previous admission  - CXR shows- stable b/l pulmonary opacities  - s/p Cefepime 2g IV x1, Levaquin 750mg IV x1, Zosyn 3.375mgv x 1   - c/w meropenem 1g q8 as per ID recs  - MRSA neg - vanc dcd (1/22)  - BCx (1/20): Staph hominis repeat BCx have been NGTD  - UCx: neg  - RVP- RSV detected  - MRSA swab neg, urine strep neg, urine legionella neg, fungitell positive at 325  - d-dimer 605 and LE duplex neg  - aspiration precautions  - NG tube placement   - tube feeds initiated  - SLP: npo by mouth  - pt still tachycardic with decreased midodrine, so resumed midodrine 10mg tid and added lopressor 25mg bid  - increased FWF  - f/u on tube feeds with RD   - resume eliquis via NGT  - spoke to the pt's advisory board and they would like to repeat an SLP eval in a few days    #Elevated Troponin (stable at 35)  - Trop 25>37>35>35  - EKG shows tachycardia, p waves present, likely sinus tach w/ a lot of artifact from shaking    #Acute on Chronic Normocytic Anemia (stable)  - Hgb 8.9 (previously 9.5)  - INR 1.43  - no evidence of hemorrhage  - b12 normal, folate normal, iron studies(ferritin 128)  - monitor H&H  - transfuse PRN, keep hgb >7    #h/o DVT of L Common Femoral Vein  - on Eliquis 5mg BID at home  - LE duplex neg    #Seizure Disorder  - on IV Keppra 500mg BID      #Hypothyroidism  - not on any medications at home  - TSH- 0.51    #h/o Right Foot OM (1st toe stump and 2nd distal phalanx)  #h/o Multiple Left Foot deep tissue injuries  #h/o Sacral Wound  - Patient underwent excisional debridement of ulcer of right foot (including 1st metatarsal) and partial amputation 2nd toe on previous admissions  - past wound cultures grew Proteus and ESBL E coli  - reposition q2hrs to prevent pressure injury  - local wound care  - no acute surgical interventions from podiatry and burn    #Down Syndrome  #Cerebral Palsy  #Non-Verbal    #DVT ppx: on Eliquis 5mg PO BID   #GI ppx: Protonix 40mg IV daily  #Diet: tube feeds  #Activity: IAT   #Code Status: Full Code  #Dispo: from Group Home ([facility]), SDU for now, acute   58F w/ PMHx Down Syndrome, nonverbal at baseline, DVT on eliquis 5mg BID,  Hypothyroidism, aspiration pneumonia in prev admissions, Cerebral palsy and Seizure Disorders presents to the ED from nursing facility/group home ([facility]) presented to ED for respiratory distress and high grade fever. Patient found to be septic on admission. Admitted for management of acute respiratory distress likely 2/2 CAP or aspiration PNA.    #Sepsis POA(fever, tachy and leukocytosis)  #Acute hypoxic resp failure 2/2 Aspiration PNA or  RSV bronchiolitis  #h/o Dysphagia - Puree Diet at baseline  - VBG Lactate 2.1 improved to wnl, procal 0.04 pH wnl and CO2 mildly elevated 61  - s/p 2L LR bolus  - previously fungitell +ve s/p Caspofungin on previous admission  - CXR shows- stable b/l pulmonary opacities  - s/p Cefepime 2g IV x1, Levaquin 750mg IV x1, Zosyn 3.375mgv x 1   - c/w meropenem 1g q8 as per ID recs  - MRSA neg - vanc dcd (1/22)  - BCx (1/20): Staph hominis repeat BCx have been NGTD  - UCx: neg  - RVP- RSV detected  - MRSA swab neg, urine strep neg, urine legionella neg, fungitell positive at 325  - d-dimer 605 and LE duplex neg  - aspiration precautions  - NG tube placement   - tube feeds initiated  - SLP: npo by mouth  - pt still tachycardic with decreased midodrine, so resumed midodrine 10mg tid and added lopressor 25mg bid  - increased FWF  - f/u on tube feeds with RD   - resume eliquis via NGT  - spoke to the pt's advisory board and they would like to repeat an SLP eval in a few days; theyre not available on the weekends so will follow up on Monday  - wean off O2    #Elevated Troponin (stable at 35)  - Trop 25>37>35>35  - EKG shows tachycardia, p waves present, likely sinus tach w/ a lot of artifact from shaking    #Acute on Chronic Normocytic Anemia (stable)  - Hgb 8.9 (previously 9.5)  - INR 1.43  - no evidence of hemorrhage  - b12 normal, folate normal, iron studies(ferritin 128)  - monitor H&H  - transfuse PRN, keep hgb >7    #h/o DVT of L Common Femoral Vein  - on Eliquis 5mg BID at home  - LE duplex neg    #Seizure Disorder  - on IV Keppra 500mg BID      #Hypothyroidism  - not on any medications at home  - TSH- 0.51    #h/o Right Foot OM (1st toe stump and 2nd distal phalanx)  #h/o Multiple Left Foot deep tissue injuries  #h/o Sacral Wound  - Patient underwent excisional debridement of ulcer of right foot (including 1st metatarsal) and partial amputation 2nd toe on previous admissions  - past wound cultures grew Proteus and ESBL E coli  - reposition q2hrs to prevent pressure injury  - local wound care  - no acute surgical interventions from podiatry and burn    #Down Syndrome  #Cerebral Palsy  #Non-Verbal    #DVT ppx: on Eliquis 5mg PO BID   #GI ppx: Protonix 40mg IV daily  #Diet: tube feeds  #Activity: IAT   #Code Status: Full Code  #Dispo: from Group Home ([facility]), SDU for now, acute

## 2024-01-27 NOTE — PROGRESS NOTE ADULT - SUBJECTIVE AND OBJECTIVE BOX
Patient is a 58y old  Female who presents with a chief complaint of Sepsis        SUBJECTIVE / OVERNIGHT EVENTS: Pt is doing well. She looks comfortable and has not been tachycardic. Is on 2L but unclear why. No episodes of desaturation overnight so asked RN to wean off O2. Updated [facility] rep bedside.   ADDITIONAL REVIEW OF SYSTEMS: as above    MEDICATIONS  (STANDING):  albuterol    90 MICROgram(s) HFA Inhaler 2 Puff(s) Inhalation two times a day  albuterol/ipratropium for Nebulization 3 milliLiter(s) Nebulizer every 6 hours  apixaban 5 milliGRAM(s) Oral two times a day  artificial  tears Solution 1 Drop(s) Both EYES two times a day  calcium carbonate   1250 mG (OsCal) 1 Tablet(s) Oral two times a day  chlorhexidine 2% Cloths 1 Application(s) Topical daily  gabapentin 300 milliGRAM(s) Oral every 12 hours  levETIRAcetam  Solution 500 milliGRAM(s) Oral every 12 hours  melatonin 3 milliGRAM(s) Oral at bedtime  meropenem  IVPB 1000 milliGRAM(s) IV Intermittent every 8 hours  midodrine. 10 milliGRAM(s) Oral every 8 hours  pantoprazole  Injectable 40 milliGRAM(s) IV Push every 24 hours  polyethylene glycol 3350 17 Gram(s) Oral at bedtime  raloxifene 60 milliGRAM(s) Oral daily  senna 2 Tablet(s) Oral at bedtime  silver sulfADIAZINE 1% Cream 1 Application(s) Topical two times a day    MEDICATIONS  (PRN):  acetaminophen     Tablet .. 650 milliGRAM(s) Oral every 6 hours PRN Temp greater or equal to 38C (100.4F), Mild Pain (1 - 3)  aluminum hydroxide/magnesium hydroxide/simethicone Suspension 30 milliLiter(s) Oral every 4 hours PRN Dyspepsia  ondansetron Injectable 4 milliGRAM(s) IV Push every 8 hours PRN Nausea and/or Vomiting      CAPILLARY BLOOD GLUCOSE        I&O's Summary    23 Jan 2024 07:01  -  24 Jan 2024 07:00  --------------------------------------------------------  IN: 50 mL / OUT: 40 mL / NET: 10 mL    24 Jan 2024 07:01  -  24 Jan 2024 18:17  --------------------------------------------------------  IN: 50 mL / OUT: 55 mL / NET: -5 mL        PHYSICAL EXAM:  Vital Signs Last 24 Hrs  T(C): 36.8 (24 Jan 2024 12:00), Max: 36.8 (24 Jan 2024 12:00)  T(F): 98.3 (24 Jan 2024 12:00), Max: 98.3 (24 Jan 2024 12:00)  HR: 112 (24 Jan 2024 15:04) (88 - 117)  BP: 115/55 (24 Jan 2024 15:04) (99/50 - 160/66)  BP(mean): --  RR: 18 (24 Jan 2024 12:00) (18 - 27)  SpO2: 95% (24 Jan 2024 05:00) (95% - 97%)    Parameters below as of 24 Jan 2024 05:00  Patient On (Oxygen Delivery Method): nasal cannula  O2 Flow (L/min): 2    CONSTITUTIONAL: Shaking  ENMT: dry oral mucosa, no pharyngeal injection or exudates; NGT in place  NECK: Supple, no palpable masses; no thyromegaly  RESPIRATORY: Normal respiratory effort; mild rhonchi in b/l bases  CARDIOVASCULAR: Regular rate and rhythm, normal S1 and S2, no murmur/rub/gallop; Peripheral pulses are 2+ bilaterally  ABDOMEN: difficult to examine due to contracted lower extremities  MUSCULOSKELETAL: contracted extremities, Complete Rt 1st and partial 2nd toe amputation  PSYCH: non verbal, developmental delays  SKIN: stage 1 sacral wound at gluteal folds    LABS:                        8.9    3.10  )-----------( 269      ( 24 Jan 2024 07:57 )             28.8     01-24    140  |  99  |  13  ----------------------------<  106<H>  3.8   |  30  |  0.6<L>    Ca    8.1<L>      24 Jan 2024 07:57  Mg     2.3     01-24    TPro  6.0  /  Alb  2.9<L>  /  TBili  <0.2  /  DBili  x   /  AST  29  /  ALT  19  /  AlkPhos  69  01-24          Urinalysis Basic - ( 24 Jan 2024 07:57 )    Color: x / Appearance: x / SG: x / pH: x  Gluc: 106 mg/dL / Ketone: x  / Bili: x / Urobili: x   Blood: x / Protein: x / Nitrite: x   Leuk Esterase: x / RBC: x / WBC x   Sq Epi: x / Non Sq Epi: x / Bacteria: x        Culture - Blood (collected 22 Jan 2024 16:51)  Source: .Blood None  Preliminary Report (24 Jan 2024 01:04):    No growth at 24 hours        RADIOLOGY & ADDITIONAL TESTS:  Results Reviewed:   Imaging Personally Reviewed:  Electrocardiogram Personally Reviewed:    COORDINATION OF CARE:  Care Discussed with Consultants/Other Providers [Y/N]:  Prior or Outpatient Records Reviewed [Y/N]:

## 2024-01-28 LAB
ALBUMIN SERPL ELPH-MCNC: 3.1 G/DL — LOW (ref 3.5–5.2)
ALP SERPL-CCNC: 92 U/L — SIGNIFICANT CHANGE UP (ref 30–115)
ALT FLD-CCNC: 73 U/L — HIGH (ref 0–41)
ANION GAP SERPL CALC-SCNC: 10 MMOL/L — SIGNIFICANT CHANGE UP (ref 7–14)
AST SERPL-CCNC: 37 U/L — SIGNIFICANT CHANGE UP (ref 0–41)
BASOPHILS # BLD AUTO: 0.04 K/UL — SIGNIFICANT CHANGE UP (ref 0–0.2)
BASOPHILS NFR BLD AUTO: 0.7 % — SIGNIFICANT CHANGE UP (ref 0–1)
BILIRUB SERPL-MCNC: <0.2 MG/DL — SIGNIFICANT CHANGE UP (ref 0.2–1.2)
BUN SERPL-MCNC: 19 MG/DL — SIGNIFICANT CHANGE UP (ref 10–20)
CALCIUM SERPL-MCNC: 8.5 MG/DL — SIGNIFICANT CHANGE UP (ref 8.4–10.5)
CHLORIDE SERPL-SCNC: 104 MMOL/L — SIGNIFICANT CHANGE UP (ref 98–110)
CO2 SERPL-SCNC: 29 MMOL/L — SIGNIFICANT CHANGE UP (ref 17–32)
CREAT SERPL-MCNC: 0.6 MG/DL — LOW (ref 0.7–1.5)
CULTURE RESULTS: SIGNIFICANT CHANGE UP
EGFR: 105 ML/MIN/1.73M2 — SIGNIFICANT CHANGE UP
EOSINOPHIL # BLD AUTO: 0.29 K/UL — SIGNIFICANT CHANGE UP (ref 0–0.7)
EOSINOPHIL NFR BLD AUTO: 5.3 % — SIGNIFICANT CHANGE UP (ref 0–8)
GLUCOSE BLDC GLUCOMTR-MCNC: 108 MG/DL — HIGH (ref 70–99)
GLUCOSE BLDC GLUCOMTR-MCNC: 119 MG/DL — HIGH (ref 70–99)
GLUCOSE SERPL-MCNC: 157 MG/DL — HIGH (ref 70–99)
HCT VFR BLD CALC: 34.1 % — LOW (ref 37–47)
HGB BLD-MCNC: 10.3 G/DL — LOW (ref 12–16)
IMM GRANULOCYTES NFR BLD AUTO: 0.4 % — HIGH (ref 0.1–0.3)
LYMPHOCYTES # BLD AUTO: 1.33 K/UL — SIGNIFICANT CHANGE UP (ref 1.2–3.4)
LYMPHOCYTES # BLD AUTO: 24.2 % — SIGNIFICANT CHANGE UP (ref 20.5–51.1)
MAGNESIUM SERPL-MCNC: 2.7 MG/DL — HIGH (ref 1.8–2.4)
MCHC RBC-ENTMCNC: 24.5 PG — LOW (ref 27–31)
MCHC RBC-ENTMCNC: 30.2 G/DL — LOW (ref 32–37)
MCV RBC AUTO: 81 FL — SIGNIFICANT CHANGE UP (ref 81–99)
MONOCYTES # BLD AUTO: 0.35 K/UL — SIGNIFICANT CHANGE UP (ref 0.1–0.6)
MONOCYTES NFR BLD AUTO: 6.4 % — SIGNIFICANT CHANGE UP (ref 1.7–9.3)
NEUTROPHILS # BLD AUTO: 3.46 K/UL — SIGNIFICANT CHANGE UP (ref 1.4–6.5)
NEUTROPHILS NFR BLD AUTO: 63 % — SIGNIFICANT CHANGE UP (ref 42.2–75.2)
NRBC # BLD: 0 /100 WBCS — SIGNIFICANT CHANGE UP (ref 0–0)
PLATELET # BLD AUTO: 499 K/UL — HIGH (ref 130–400)
PMV BLD: 10.4 FL — SIGNIFICANT CHANGE UP (ref 7.4–10.4)
POTASSIUM SERPL-MCNC: 4.6 MMOL/L — SIGNIFICANT CHANGE UP (ref 3.5–5)
POTASSIUM SERPL-SCNC: 4.6 MMOL/L — SIGNIFICANT CHANGE UP (ref 3.5–5)
PROT SERPL-MCNC: 6.8 G/DL — SIGNIFICANT CHANGE UP (ref 6–8)
RBC # BLD: 4.21 M/UL — SIGNIFICANT CHANGE UP (ref 4.2–5.4)
RBC # FLD: 19.6 % — HIGH (ref 11.5–14.5)
SODIUM SERPL-SCNC: 143 MMOL/L — SIGNIFICANT CHANGE UP (ref 135–146)
SPECIMEN SOURCE: SIGNIFICANT CHANGE UP
WBC # BLD: 5.49 K/UL — SIGNIFICANT CHANGE UP (ref 4.8–10.8)
WBC # FLD AUTO: 5.49 K/UL — SIGNIFICANT CHANGE UP (ref 4.8–10.8)

## 2024-01-28 PROCEDURE — 99232 SBSQ HOSP IP/OBS MODERATE 35: CPT

## 2024-01-28 RX ADMIN — GABAPENTIN 300 MILLIGRAM(S): 400 CAPSULE ORAL at 18:20

## 2024-01-28 RX ADMIN — Medication 10 MILLILITER(S): at 12:45

## 2024-01-28 RX ADMIN — MIDODRINE HYDROCHLORIDE 10 MILLIGRAM(S): 2.5 TABLET ORAL at 05:26

## 2024-01-28 RX ADMIN — Medication 3 MILLIGRAM(S): at 23:05

## 2024-01-28 RX ADMIN — PANTOPRAZOLE SODIUM 40 MILLIGRAM(S): 20 TABLET, DELAYED RELEASE ORAL at 05:28

## 2024-01-28 RX ADMIN — Medication 1 APPLICATION(S): at 05:28

## 2024-01-28 RX ADMIN — Medication 1 DROP(S): at 05:29

## 2024-01-28 RX ADMIN — Medication 3 MILLILITER(S): at 20:16

## 2024-01-28 RX ADMIN — Medication 1 DROP(S): at 18:21

## 2024-01-28 RX ADMIN — LEVETIRACETAM 500 MILLIGRAM(S): 250 TABLET, FILM COATED ORAL at 18:20

## 2024-01-28 RX ADMIN — LEVETIRACETAM 500 MILLIGRAM(S): 250 TABLET, FILM COATED ORAL at 05:25

## 2024-01-28 RX ADMIN — Medication 1 TABLET(S): at 05:27

## 2024-01-28 RX ADMIN — Medication 10 MILLILITER(S): at 23:06

## 2024-01-28 RX ADMIN — GABAPENTIN 300 MILLIGRAM(S): 400 CAPSULE ORAL at 05:27

## 2024-01-28 RX ADMIN — CHLORHEXIDINE GLUCONATE 1 APPLICATION(S): 213 SOLUTION TOPICAL at 12:45

## 2024-01-28 RX ADMIN — APIXABAN 5 MILLIGRAM(S): 2.5 TABLET, FILM COATED ORAL at 05:26

## 2024-01-28 RX ADMIN — Medication 1 TABLET(S): at 18:20

## 2024-01-28 RX ADMIN — RALOXIFENE HYDROCHLORIDE 60 MILLIGRAM(S): 60 TABLET, COATED ORAL at 12:45

## 2024-01-28 RX ADMIN — APIXABAN 5 MILLIGRAM(S): 2.5 TABLET, FILM COATED ORAL at 18:21

## 2024-01-28 RX ADMIN — Medication 1 APPLICATION(S): at 18:21

## 2024-01-28 RX ADMIN — Medication 10 MILLILITER(S): at 05:25

## 2024-01-28 RX ADMIN — MIDODRINE HYDROCHLORIDE 10 MILLIGRAM(S): 2.5 TABLET ORAL at 13:25

## 2024-01-28 RX ADMIN — Medication 10 MILLILITER(S): at 18:20

## 2024-01-28 RX ADMIN — MIDODRINE HYDROCHLORIDE 10 MILLIGRAM(S): 2.5 TABLET ORAL at 23:06

## 2024-01-28 RX ADMIN — Medication 25 MILLIGRAM(S): at 18:20

## 2024-01-28 NOTE — PROGRESS NOTE ADULT - ASSESSMENT
58F w/ PMHx Down Syndrome, nonverbal at baseline, DVT on eliquis 5mg BID,  Hypothyroidism, aspiration pneumonia in prev admissions, Cerebral palsy and Seizure Disorders presents to the ED from nursing facility/group home ([facility]) presented to ED for respiratory distress and high grade fever. Patient found to be septic on admission. Admitted for management of acute respiratory distress likely 2/2 CAP or aspiration PNA.    #Sepsis POA(fever, tachy and leukocytosis)  #Acute hypoxic resp failure 2/2 Aspiration PNA or  RSV bronchiolitis  #h/o Dysphagia - Puree Diet at baseline  - VBG Lactate 2.1 improved to wnl, procal 0.04 pH wnl and CO2 mildly elevated 61  - s/p 2L LR bolus  - previously fungitell +ve s/p Caspofungin on previous admission  - CXR shows- stable b/l pulmonary opacities  - s/p Cefepime 2g IV x1, Levaquin 750mg IV x1, Zosyn 3.375mgv x 1   - c/w meropenem 1g q8 as per ID recs  - MRSA neg - vanc dcd (1/22)  - BCx (1/20): Staph hominis repeat BCx have been NGTD  - UCx: neg  - RVP- RSV detected  - MRSA swab neg, urine strep neg, urine legionella neg, fungitell positive at 325  - d-dimer 605 and LE duplex neg  - aspiration precautions  - NG tube placement   - tube feeds initiated  - SLP: npo by mouth  - pt still tachycardic with decreased midodrine, so resumed midodrine 10mg tid and added lopressor 25mg bid  - increased FWF  - f/u on tube feeds with RD   - resume eliquis via NGT  - spoke to the pt's advisory board and they would like to repeat an SLP eval in a few days; theyre not available on the weekends so will follow up on Monday  - wean off O2    #Elevated Troponin (stable at 35)  - Trop 25>37>35>35  - EKG shows tachycardia, p waves present, likely sinus tach w/ a lot of artifact from shaking    #Acute on Chronic Normocytic Anemia (stable)  - Hgb 8.9 (previously 9.5)  - INR 1.43  - no evidence of hemorrhage  - b12 normal, folate normal, iron studies(ferritin 128)  - monitor H&H  - transfuse PRN, keep hgb >7    #h/o DVT of L Common Femoral Vein  - on Eliquis 5mg BID at home  - LE duplex neg    #Seizure Disorder  - on IV Keppra 500mg BID      #Hypothyroidism  - not on any medications at home  - TSH- 0.51    #h/o Right Foot OM (1st toe stump and 2nd distal phalanx)  #h/o Multiple Left Foot deep tissue injuries  #h/o Sacral Wound  - Patient underwent excisional debridement of ulcer of right foot (including 1st metatarsal) and partial amputation 2nd toe on previous admissions  - past wound cultures grew Proteus and ESBL E coli  - reposition q2hrs to prevent pressure injury  - local wound care  - no acute surgical interventions from podiatry and burn    #Down Syndrome  #Cerebral Palsy  #Non-Verbal    #DVT ppx: on Eliquis 5mg PO BID   #GI ppx: Protonix 40mg IV daily  #Diet: tube feeds  #Activity: IAT   #Code Status: Full Code  #[facility]

## 2024-01-28 NOTE — PROGRESS NOTE ADULT - SUBJECTIVE AND OBJECTIVE BOX
Patient is a 58y old  Female who presents with a chief complaint of Sepsis        SUBJECTIVE / OVERNIGHT EVENTS: Pt is doing well. She looks comfortable. Back on 2L unclear why. Asked RN to wean down.     ADDITIONAL REVIEW OF SYSTEMS: as above    MEDICATIONS  (STANDING):  albuterol    90 MICROgram(s) HFA Inhaler 2 Puff(s) Inhalation two times a day  albuterol/ipratropium for Nebulization 3 milliLiter(s) Nebulizer every 6 hours  apixaban 5 milliGRAM(s) Oral two times a day  artificial  tears Solution 1 Drop(s) Both EYES two times a day  calcium carbonate   1250 mG (OsCal) 1 Tablet(s) Oral two times a day  chlorhexidine 2% Cloths 1 Application(s) Topical daily  gabapentin 300 milliGRAM(s) Oral every 12 hours  levETIRAcetam  Solution 500 milliGRAM(s) Oral every 12 hours  melatonin 3 milliGRAM(s) Oral at bedtime  meropenem  IVPB 1000 milliGRAM(s) IV Intermittent every 8 hours  midodrine. 10 milliGRAM(s) Oral every 8 hours  pantoprazole  Injectable 40 milliGRAM(s) IV Push every 24 hours  polyethylene glycol 3350 17 Gram(s) Oral at bedtime  raloxifene 60 milliGRAM(s) Oral daily  senna 2 Tablet(s) Oral at bedtime  silver sulfADIAZINE 1% Cream 1 Application(s) Topical two times a day    MEDICATIONS  (PRN):  acetaminophen     Tablet .. 650 milliGRAM(s) Oral every 6 hours PRN Temp greater or equal to 38C (100.4F), Mild Pain (1 - 3)  aluminum hydroxide/magnesium hydroxide/simethicone Suspension 30 milliLiter(s) Oral every 4 hours PRN Dyspepsia  ondansetron Injectable 4 milliGRAM(s) IV Push every 8 hours PRN Nausea and/or Vomiting      CAPILLARY BLOOD GLUCOSE        I&O's Summary    23 Jan 2024 07:01  -  24 Jan 2024 07:00  --------------------------------------------------------  IN: 50 mL / OUT: 40 mL / NET: 10 mL    24 Jan 2024 07:01  -  24 Jan 2024 18:17  --------------------------------------------------------  IN: 50 mL / OUT: 55 mL / NET: -5 mL        PHYSICAL EXAM:  Vital Signs Last 24 Hrs  T(C): 36.8 (24 Jan 2024 12:00), Max: 36.8 (24 Jan 2024 12:00)  T(F): 98.3 (24 Jan 2024 12:00), Max: 98.3 (24 Jan 2024 12:00)  HR: 112 (24 Jan 2024 15:04) (88 - 117)  BP: 115/55 (24 Jan 2024 15:04) (99/50 - 160/66)  BP(mean): --  RR: 18 (24 Jan 2024 12:00) (18 - 27)  SpO2: 95% (24 Jan 2024 05:00) (95% - 97%)    Parameters below as of 24 Jan 2024 05:00  Patient On (Oxygen Delivery Method): nasal cannula  O2 Flow (L/min): 2    CONSTITUTIONAL: Shaking  ENMT: dry oral mucosa, no pharyngeal injection or exudates; NGT in place  NECK: Supple, no palpable masses; no thyromegaly  RESPIRATORY: Normal respiratory effort; mild rhonchi in b/l bases  CARDIOVASCULAR: Regular rate and rhythm, normal S1 and S2, no murmur/rub/gallop; Peripheral pulses are 2+ bilaterally  ABDOMEN: difficult to examine due to contracted lower extremities  MUSCULOSKELETAL: contracted extremities, Complete Rt 1st and partial 2nd toe amputation  PSYCH: non verbal, developmental delays  SKIN: stage 1 sacral wound at gluteal folds    LABS:                        8.9    3.10  )-----------( 269      ( 24 Jan 2024 07:57 )             28.8     01-24    140  |  99  |  13  ----------------------------<  106<H>  3.8   |  30  |  0.6<L>    Ca    8.1<L>      24 Jan 2024 07:57  Mg     2.3     01-24    TPro  6.0  /  Alb  2.9<L>  /  TBili  <0.2  /  DBili  x   /  AST  29  /  ALT  19  /  AlkPhos  69  01-24          Urinalysis Basic - ( 24 Jan 2024 07:57 )    Color: x / Appearance: x / SG: x / pH: x  Gluc: 106 mg/dL / Ketone: x  / Bili: x / Urobili: x   Blood: x / Protein: x / Nitrite: x   Leuk Esterase: x / RBC: x / WBC x   Sq Epi: x / Non Sq Epi: x / Bacteria: x        Culture - Blood (collected 22 Jan 2024 16:51)  Source: .Blood None  Preliminary Report (24 Jan 2024 01:04):    No growth at 24 hours        RADIOLOGY & ADDITIONAL TESTS:  Results Reviewed:   Imaging Personally Reviewed:  Electrocardiogram Personally Reviewed:    COORDINATION OF CARE:  Care Discussed with Consultants/Other Providers [Y/N]:  Prior or Outpatient Records Reviewed [Y/N]:

## 2024-01-29 LAB
ALBUMIN SERPL ELPH-MCNC: 3.1 G/DL — LOW (ref 3.5–5.2)
ALP SERPL-CCNC: 93 U/L — SIGNIFICANT CHANGE UP (ref 30–115)
ALT FLD-CCNC: 54 U/L — HIGH (ref 0–41)
ANION GAP SERPL CALC-SCNC: 9 MMOL/L — SIGNIFICANT CHANGE UP (ref 7–14)
AST SERPL-CCNC: 27 U/L — SIGNIFICANT CHANGE UP (ref 0–41)
BASOPHILS # BLD AUTO: 0.06 K/UL — SIGNIFICANT CHANGE UP (ref 0–0.2)
BASOPHILS NFR BLD AUTO: 1 % — SIGNIFICANT CHANGE UP (ref 0–1)
BILIRUB SERPL-MCNC: 0.2 MG/DL — SIGNIFICANT CHANGE UP (ref 0.2–1.2)
BUN SERPL-MCNC: 23 MG/DL — HIGH (ref 10–20)
CALCIUM SERPL-MCNC: 8.4 MG/DL — SIGNIFICANT CHANGE UP (ref 8.4–10.5)
CHLORIDE SERPL-SCNC: 101 MMOL/L — SIGNIFICANT CHANGE UP (ref 98–110)
CO2 SERPL-SCNC: 30 MMOL/L — SIGNIFICANT CHANGE UP (ref 17–32)
CREAT SERPL-MCNC: 0.7 MG/DL — SIGNIFICANT CHANGE UP (ref 0.7–1.5)
EGFR: 101 ML/MIN/1.73M2 — SIGNIFICANT CHANGE UP
EOSINOPHIL # BLD AUTO: 0.25 K/UL — SIGNIFICANT CHANGE UP (ref 0–0.7)
EOSINOPHIL NFR BLD AUTO: 4.1 % — SIGNIFICANT CHANGE UP (ref 0–8)
GLUCOSE BLDC GLUCOMTR-MCNC: 129 MG/DL — HIGH (ref 70–99)
GLUCOSE BLDC GLUCOMTR-MCNC: 160 MG/DL — HIGH (ref 70–99)
GLUCOSE SERPL-MCNC: 146 MG/DL — HIGH (ref 70–99)
HCT VFR BLD CALC: 32.8 % — LOW (ref 37–47)
HGB BLD-MCNC: 10 G/DL — LOW (ref 12–16)
IMM GRANULOCYTES NFR BLD AUTO: 0.7 % — HIGH (ref 0.1–0.3)
LYMPHOCYTES # BLD AUTO: 1.61 K/UL — SIGNIFICANT CHANGE UP (ref 1.2–3.4)
LYMPHOCYTES # BLD AUTO: 26.3 % — SIGNIFICANT CHANGE UP (ref 20.5–51.1)
MAGNESIUM SERPL-MCNC: 2.7 MG/DL — HIGH (ref 1.8–2.4)
MCHC RBC-ENTMCNC: 24.3 PG — LOW (ref 27–31)
MCHC RBC-ENTMCNC: 30.5 G/DL — LOW (ref 32–37)
MCV RBC AUTO: 79.8 FL — LOW (ref 81–99)
MONOCYTES # BLD AUTO: 0.48 K/UL — SIGNIFICANT CHANGE UP (ref 0.1–0.6)
MONOCYTES NFR BLD AUTO: 7.8 % — SIGNIFICANT CHANGE UP (ref 1.7–9.3)
NEUTROPHILS # BLD AUTO: 3.68 K/UL — SIGNIFICANT CHANGE UP (ref 1.4–6.5)
NEUTROPHILS NFR BLD AUTO: 60.1 % — SIGNIFICANT CHANGE UP (ref 42.2–75.2)
NRBC # BLD: 0 /100 WBCS — SIGNIFICANT CHANGE UP (ref 0–0)
PLATELET # BLD AUTO: 591 K/UL — HIGH (ref 130–400)
PMV BLD: 10.6 FL — HIGH (ref 7.4–10.4)
POTASSIUM SERPL-MCNC: 5.1 MMOL/L — HIGH (ref 3.5–5)
POTASSIUM SERPL-SCNC: 5.1 MMOL/L — HIGH (ref 3.5–5)
PROT SERPL-MCNC: 6.8 G/DL — SIGNIFICANT CHANGE UP (ref 6–8)
RBC # BLD: 4.11 M/UL — LOW (ref 4.2–5.4)
RBC # FLD: 19.7 % — HIGH (ref 11.5–14.5)
SODIUM SERPL-SCNC: 140 MMOL/L — SIGNIFICANT CHANGE UP (ref 135–146)
WBC # BLD: 6.12 K/UL — SIGNIFICANT CHANGE UP (ref 4.8–10.8)
WBC # FLD AUTO: 6.12 K/UL — SIGNIFICANT CHANGE UP (ref 4.8–10.8)

## 2024-01-29 PROCEDURE — 99232 SBSQ HOSP IP/OBS MODERATE 35: CPT

## 2024-01-29 RX ORDER — SODIUM ZIRCONIUM CYCLOSILICATE 10 G/10G
5 POWDER, FOR SUSPENSION ORAL ONCE
Refills: 0 | Status: COMPLETED | OUTPATIENT
Start: 2024-01-29 | End: 2024-01-29

## 2024-01-29 RX ADMIN — Medication 3 MILLILITER(S): at 08:37

## 2024-01-29 RX ADMIN — GABAPENTIN 300 MILLIGRAM(S): 400 CAPSULE ORAL at 05:31

## 2024-01-29 RX ADMIN — APIXABAN 5 MILLIGRAM(S): 2.5 TABLET, FILM COATED ORAL at 05:30

## 2024-01-29 RX ADMIN — Medication 3 MILLIGRAM(S): at 22:17

## 2024-01-29 RX ADMIN — RALOXIFENE HYDROCHLORIDE 60 MILLIGRAM(S): 60 TABLET, COATED ORAL at 12:43

## 2024-01-29 RX ADMIN — POLYETHYLENE GLYCOL 3350 17 GRAM(S): 17 POWDER, FOR SOLUTION ORAL at 22:14

## 2024-01-29 RX ADMIN — SENNA PLUS 2 TABLET(S): 8.6 TABLET ORAL at 22:16

## 2024-01-29 RX ADMIN — Medication 10 MILLILITER(S): at 17:02

## 2024-01-29 RX ADMIN — MIDODRINE HYDROCHLORIDE 10 MILLIGRAM(S): 2.5 TABLET ORAL at 14:02

## 2024-01-29 RX ADMIN — Medication 1 TABLET(S): at 05:31

## 2024-01-29 RX ADMIN — Medication 10 MILLILITER(S): at 05:30

## 2024-01-29 RX ADMIN — CHLORHEXIDINE GLUCONATE 1 APPLICATION(S): 213 SOLUTION TOPICAL at 12:54

## 2024-01-29 RX ADMIN — Medication 25 MILLIGRAM(S): at 17:02

## 2024-01-29 RX ADMIN — Medication 25 MILLIGRAM(S): at 05:31

## 2024-01-29 RX ADMIN — GABAPENTIN 300 MILLIGRAM(S): 400 CAPSULE ORAL at 17:02

## 2024-01-29 RX ADMIN — MIDODRINE HYDROCHLORIDE 10 MILLIGRAM(S): 2.5 TABLET ORAL at 05:31

## 2024-01-29 RX ADMIN — LEVETIRACETAM 500 MILLIGRAM(S): 250 TABLET, FILM COATED ORAL at 05:29

## 2024-01-29 RX ADMIN — Medication 3 MILLILITER(S): at 19:53

## 2024-01-29 RX ADMIN — PANTOPRAZOLE SODIUM 40 MILLIGRAM(S): 20 TABLET, DELAYED RELEASE ORAL at 05:31

## 2024-01-29 RX ADMIN — LEVETIRACETAM 500 MILLIGRAM(S): 250 TABLET, FILM COATED ORAL at 17:02

## 2024-01-29 RX ADMIN — Medication 10 MILLILITER(S): at 12:43

## 2024-01-29 RX ADMIN — Medication 1 APPLICATION(S): at 17:03

## 2024-01-29 RX ADMIN — SODIUM ZIRCONIUM CYCLOSILICATE 5 GRAM(S): 10 POWDER, FOR SUSPENSION ORAL at 12:43

## 2024-01-29 RX ADMIN — Medication 1 DROP(S): at 05:32

## 2024-01-29 RX ADMIN — Medication 1 APPLICATION(S): at 05:29

## 2024-01-29 RX ADMIN — MIDODRINE HYDROCHLORIDE 10 MILLIGRAM(S): 2.5 TABLET ORAL at 22:16

## 2024-01-29 RX ADMIN — APIXABAN 5 MILLIGRAM(S): 2.5 TABLET, FILM COATED ORAL at 17:03

## 2024-01-29 RX ADMIN — Medication 1 DROP(S): at 17:03

## 2024-01-29 RX ADMIN — Medication 1 TABLET(S): at 17:03

## 2024-01-29 RX ADMIN — Medication 3 MILLILITER(S): at 13:31

## 2024-01-29 NOTE — SWALLOW BEDSIDE ASSESSMENT ADULT - SLP GENERAL OBSERVATIONS
pt received in bed awake alert in no apparent pain. +2L NC +NGT in place +baseline audible rhonchi; +[facility] isaias Fuentes at bedside

## 2024-01-29 NOTE — SWALLOW BEDSIDE ASSESSMENT ADULT - DIET PRIOR TO ADMI
puree, thins per 11/23 SLP recs
puree, thin liquids per ROGELIO Fuentes
puree, thins per 11/23 FEES SLP recs

## 2024-01-29 NOTE — PROGRESS NOTE ADULT - ASSESSMENT
58F w/ PMHx Down Syndrome, nonverbal at baseline, DVT on eliquis 5mg BID,  Hypothyroidism, aspiration pneumonia in prev admissions, Cerebral palsy and Seizure Disorders presents to the ED from nursing facility/group home ([facility]) presented to ED for respiratory distress and high grade fever. Patient found to be septic on admission. Admitted for management of acute respiratory distress likely 2/2 CAP or aspiration PNA.    #Sepsis POA(fever, tachy and leukocytosis)  #Acute hypoxic resp failure 2/2 Aspiration PNA or RSV bronchiolitis  #h/o Dysphagia - Puree Diet at baseline  - VBG Lactate 2.1 improved to wnl, procal 0.04 pH wnl and CO2 mildly elevated 61  - s/p 2L LR bolus  - previously fungitell +ve s/p Caspofungin on previous admission  - CXR shows- stable b/l pulmonary opacities  - MRSA neg - vanc dcd (1/22)  - BCx (1/20): Staph hominis repeat BCx have been NGTD  - UCx: neg  - completed abx on 1/27  - RVP- RSV detected  - MRSA swab neg, urine strep neg, urine legionella neg, fungitell positive at 325  - d-dimer 605 and LE duplex neg  - aspiration precautions  - NG tube placement   - tube feeds initiated  - SLP: npo by mouth  - midodrine 10mg tid   - pt was tachycardic intermittently so added lopressor 25mg bid; can stop if pt becomes bradycardic but currently well controlled  - increased FWF  - f/u on tube feeds with RD   - resume eliquis via NGT  - SLP re-evaluated pt on 1/29 and recommended FEES; will speak to advisory board. Left VM and callback number.  - wean off O2    #Elevated Troponin (stable at 35)  - Trop 25>37>35>35  - EKG shows tachycardia, p waves present, likely sinus tach w/ a lot of artifact from shaking    #Acute on Chronic Normocytic Anemia (stable)  - Hgb 8.9 (previously 9.5)  - INR 1.43  - no evidence of hemorrhage  - b12 normal, folate normal, iron studies(ferritin 128)  - monitor H&H  - transfuse PRN, keep hgb >7    #h/o DVT of L Common Femoral Vein  - on Eliquis 5mg BID at home  - LE duplex neg    #Seizure Disorder  - on IV Keppra 500mg BID    #Hypothyroidism  - not on any medications at home  - TSH- 0.51    #h/o Right Foot OM (1st toe stump and 2nd distal phalanx)  #h/o Multiple Left Foot deep tissue injuries  #h/o Sacral Wound  - Patient underwent excisional debridement of ulcer of right foot (including 1st metatarsal) and partial amputation 2nd toe on previous admissions  - past wound cultures grew Proteus and ESBL E coli  - reposition q2hrs to prevent pressure injury  - local wound care  - no acute surgical interventions from podiatry and burn    #Down Syndrome  #Cerebral Palsy  #Non-Verbal    #DVT ppx: on Eliquis 5mg PO BID   #GI ppx: Protonix 40mg IV daily  #Diet: tube feeds  #Activity: IAT   #Code Status: Full Code  #Dispo: from Group Home ([facility])

## 2024-01-29 NOTE — SWALLOW BEDSIDE ASSESSMENT ADULT - SWALLOW EVAL: DIAGNOSIS
+improved toleration for puree and mildly thick liquids w/o overt s/s aspiration/penetration; +baseline rhonchus which was unchanged t/o PO trials.

## 2024-01-29 NOTE — PROGRESS NOTE ADULT - SUBJECTIVE AND OBJECTIVE BOX
Patient is a 58y old  Female who presents with a chief complaint of Sepsis      SUBJECTIVE / OVERNIGHT EVENTS: Pt is doing ok. She is back on 2L, asked RN to wean off. This am her gown had fallen off and the room was cold, so she was shaking. A [facility] aide was bedside. Asked RN to change her and give her more blankets.     ADDITIONAL REVIEW OF SYSTEMS: as above    MEDICATIONS  (STANDING):  albuterol    90 MICROgram(s) HFA Inhaler 2 Puff(s) Inhalation two times a day  albuterol/ipratropium for Nebulization 3 milliLiter(s) Nebulizer every 6 hours  apixaban 5 milliGRAM(s) Oral two times a day  artificial  tears Solution 1 Drop(s) Both EYES two times a day  calcium carbonate   1250 mG (OsCal) 1 Tablet(s) Oral two times a day  chlorhexidine 2% Cloths 1 Application(s) Topical daily  gabapentin 300 milliGRAM(s) Oral every 12 hours  levETIRAcetam  Solution 500 milliGRAM(s) Oral every 12 hours  melatonin 3 milliGRAM(s) Oral at bedtime  meropenem  IVPB 1000 milliGRAM(s) IV Intermittent every 8 hours  midodrine. 10 milliGRAM(s) Oral every 8 hours  pantoprazole  Injectable 40 milliGRAM(s) IV Push every 24 hours  polyethylene glycol 3350 17 Gram(s) Oral at bedtime  raloxifene 60 milliGRAM(s) Oral daily  senna 2 Tablet(s) Oral at bedtime  silver sulfADIAZINE 1% Cream 1 Application(s) Topical two times a day    MEDICATIONS  (PRN):  acetaminophen     Tablet .. 650 milliGRAM(s) Oral every 6 hours PRN Temp greater or equal to 38C (100.4F), Mild Pain (1 - 3)  aluminum hydroxide/magnesium hydroxide/simethicone Suspension 30 milliLiter(s) Oral every 4 hours PRN Dyspepsia  ondansetron Injectable 4 milliGRAM(s) IV Push every 8 hours PRN Nausea and/or Vomiting      CAPILLARY BLOOD GLUCOSE        I&O's Summary    23 Jan 2024 07:01  -  24 Jan 2024 07:00  --------------------------------------------------------  IN: 50 mL / OUT: 40 mL / NET: 10 mL    24 Jan 2024 07:01  -  24 Jan 2024 18:17  --------------------------------------------------------  IN: 50 mL / OUT: 55 mL / NET: -5 mL        PHYSICAL EXAM:  Vital Signs Last 24 Hrs  T(C): 36.8 (24 Jan 2024 12:00), Max: 36.8 (24 Jan 2024 12:00)  T(F): 98.3 (24 Jan 2024 12:00), Max: 98.3 (24 Jan 2024 12:00)  HR: 112 (24 Jan 2024 15:04) (88 - 117)  BP: 115/55 (24 Jan 2024 15:04) (99/50 - 160/66)  BP(mean): --  RR: 18 (24 Jan 2024 12:00) (18 - 27)  SpO2: 95% (24 Jan 2024 05:00) (95% - 97%)    Parameters below as of 24 Jan 2024 05:00  Patient On (Oxygen Delivery Method): nasal cannula  O2 Flow (L/min): 2    CONSTITUTIONAL: Shaking  ENMT: dry oral mucosa, no pharyngeal injection or exudates; NGT in place  NECK: Supple, no palpable masses; no thyromegaly  RESPIRATORY: Normal respiratory effort; mild rhonchi in b/l bases  CARDIOVASCULAR: Regular rate and rhythm, normal S1 and S2, no murmur/rub/gallop; Peripheral pulses are 2+ bilaterally  ABDOMEN: difficult to examine due to contracted lower extremities  MUSCULOSKELETAL: contracted extremities, Complete Rt 1st and partial 2nd toe amputation  PSYCH: non verbal, developmental delays  SKIN: stage 1 sacral wound at gluteal folds    LABS:                        8.9    3.10  )-----------( 269      ( 24 Jan 2024 07:57 )             28.8     01-24    140  |  99  |  13  ----------------------------<  106<H>  3.8   |  30  |  0.6<L>    Ca    8.1<L>      24 Jan 2024 07:57  Mg     2.3     01-24    TPro  6.0  /  Alb  2.9<L>  /  TBili  <0.2  /  DBili  x   /  AST  29  /  ALT  19  /  AlkPhos  69  01-24          Urinalysis Basic - ( 24 Jan 2024 07:57 )    Color: x / Appearance: x / SG: x / pH: x  Gluc: 106 mg/dL / Ketone: x  / Bili: x / Urobili: x   Blood: x / Protein: x / Nitrite: x   Leuk Esterase: x / RBC: x / WBC x   Sq Epi: x / Non Sq Epi: x / Bacteria: x        Culture - Blood (collected 22 Jan 2024 16:51)  Source: .Blood None  Preliminary Report (24 Jan 2024 01:04):    No growth at 24 hours        RADIOLOGY & ADDITIONAL TESTS:  Results Reviewed:   Imaging Personally Reviewed:  Electrocardiogram Personally Reviewed:    COORDINATION OF CARE:  Care Discussed with Consultants/Other Providers [Y/N]:  Prior or Outpatient Records Reviewed [Y/N]:

## 2024-01-29 NOTE — PROGRESS NOTE ADULT - ASSESSMENT
58F w/ PMHx Down Syndrome, nonverbal at baseline, DVT on eliquis 5mg BID,  Hypothyroidism, aspiration pneumonia in prev admissions, Cerebral palsy and Seizure Disorders presents to the ED from nursing facility/group home ([facility]) presented to ED for respiratory distress and high grade fever. Patient found to be septic on admission. Admitted for management of acute respiratory distress likely 2/2 CAP or aspiration PNA.    #Sepsis POA(fever, tachy and leukocytosis)  #Acute hypoxic resp failure 2/2 Aspiration PNA or RSV bronchiolitis  #h/o Dysphagia - Puree Diet at baseline  - VBG Lactate 2.1 improved to wnl, procal 0.04 pH wnl and CO2 mildly elevated 61  - s/p 2L LR bolus  - previously fungitell +ve s/p Caspofungin on previous admission  - CXR shows- stable b/l pulmonary opacities  - MRSA neg - vanc dcd (1/22)  - BCx (1/20): Staph hominis repeat BCx have been NGTD  - UCx: neg  - completed abx on 1/27  - RVP- RSV detected  - MRSA swab neg, urine strep neg, urine legionella neg, fungitell positive at 325  - d-dimer 605 and LE duplex neg  - midodrine 10mg tid   - pt was tachycardic intermittently so added lopressor 25mg bid; can stop if pt becomes bradycardic but currently well controlled  - increased FWF  - SLP unable to perform FEES today, will follow up tomorrow, keep NPO for now  - wean O2 as tolerated    #Elevated Troponin (stable at 35)  - Trop 25>37>35>35  - EKG shows tachycardia, p waves present, likely sinus tach w/ a lot of artifact from shaking    #Acute on Chronic Normocytic Anemia (stable)  - Hgb 8.9 (previously 9.5)  - INR 1.43  - no evidence of hemorrhage  - b12 normal, folate normal, iron studies (ferritin 128)  - monitor H&H  - transfuse PRN, keep hgb >7    #h/o DVT of L Common Femoral Vein  - on Eliquis 5mg BID at home  - LE duplex neg    #Seizure Disorder  - on IV Keppra 500mg BID    #Hypothyroidism  - not on any medications at home  - TSH- 0.51    #h/o Right Foot OM (1st toe stump and 2nd distal phalanx)  #h/o Multiple Left Foot deep tissue injuries  #h/o Sacral Wound  - Patient underwent excisional debridement of ulcer of right foot (including 1st metatarsal) and partial amputation 2nd toe on previous admissions  - past wound cultures grew Proteus and ESBL E coli  - reposition q2hrs to prevent pressure injury  - local wound care  - no acute surgical interventions from podiatry and burn    #Down Syndrome  #Cerebral Palsy  #Non-Verbal    --------------------------------------------------  #DVT prophylaxis: eliquis   #GI prophylaxis: protonix  #MRSA prophylaxis: chlorhexidine 2% cloths  #Diet: NPO with tube feeds  #Activity: ambulate as tolerated  #Code status: full code  #Disposition: acute  --------------------------------------------------  #Handoff: see points bolded above and any points listed below  - discharge back to [facility] ([facility])

## 2024-01-29 NOTE — PROGRESS NOTE ADULT - SUBJECTIVE AND OBJECTIVE BOX
Interval events:    Patient is a 58y old Female who presents with a chief complaint of Respiratory Distress (29 Jan 2024 13:25)    Primary diagnosis of Sepsis with acute hypoxic respiratory failure    Today is hospital day 9d  This morning patient was seen and examined at bedside  History was limited because at their current baseline, the patient is: nonverbal  Per aide at bedside no complaints    Code Status:  see end of document    Family communication:  Contact date:  Name of person contacted:  Relationship to patient:  Communication details:  What matters most:    PAST MEDICAL & SURGICAL HISTORY  Down syndrome    Osteoporosis    Mild anemia    Neuropathy    S/P debridement  of R hip on 3/2/21      SOCIAL HISTORY:  Social History:      ALLERGIES:  No Known Allergies    MEDICATIONS:  STANDING MEDICATIONS  albuterol    90 MICROgram(s) HFA Inhaler 2 Puff(s) Inhalation two times a day  albuterol/ipratropium for Nebulization 3 milliLiter(s) Nebulizer every 6 hours  apixaban 5 milliGRAM(s) Oral two times a day  artificial  tears Solution 1 Drop(s) Both EYES two times a day  calcium carbonate   1250 mG (OsCal) 1 Tablet(s) Oral two times a day  chlorhexidine 2% Cloths 1 Application(s) Topical daily  gabapentin 300 milliGRAM(s) Oral every 12 hours  guaifenesin/dextromethorphan Oral Liquid 10 milliLiter(s) Oral four times a day  levETIRAcetam  Solution 500 milliGRAM(s) Oral every 12 hours  melatonin 3 milliGRAM(s) Oral at bedtime  metoprolol tartrate 25 milliGRAM(s) Oral every 12 hours  midodrine. 10 milliGRAM(s) Oral every 8 hours  pantoprazole  Injectable 40 milliGRAM(s) IV Push every 24 hours  polyethylene glycol 3350 17 Gram(s) Oral at bedtime  raloxifene 60 milliGRAM(s) Oral daily  senna 2 Tablet(s) Oral at bedtime  silver sulfADIAZINE 1% Cream 1 Application(s) Topical two times a day    PRN MEDICATIONS  acetaminophen     Tablet .. 650 milliGRAM(s) Oral every 6 hours PRN  aluminum hydroxide/magnesium hydroxide/simethicone Suspension 30 milliLiter(s) Oral every 4 hours PRN  ondansetron Injectable 4 milliGRAM(s) IV Push every 8 hours PRN    VITALS:   T(F): 99.1  HR: 88  BP: 102/70  RR: 18  SpO2: --    PHYSICAL EXAM:  GENERAL:   ( x ) NAD, lying in bed comfortably     (  ) obtunded     (  ) lethargic     (  ) somnolent    HEAD:   ( x ) Atraumatic     (  ) hematoma     (  ) laceration (specify location:       )     NECK:  ( x ) Supple     (  ) neck stiffness     (  ) nuchal rigidity     (  )  no JVD     (  ) JVD present ( -- cm)    CHEST:  (  ) Chest wall tenderness (specify location:       )    HEART:  Rate -->     ( x ) normal rate     (  ) bradycardic     (  ) tachycardic  Rhythm -->     ( x ) regular     (  ) regularly irregular     (  ) irregularly irregular  Murmurs -->     ( x ) normal s1s2     (  ) systolic murmur     (  ) diastolic murmur     (  ) continuous murmur      (  ) S3 present     (  ) S4 present    LUNG:   ( x ) Unlabored respirations     (  ) tachypnea  ( x ) B/L air entry     (  ) decreased breath sounds in:  (location     )    ( x ) no adventitious sound     (  ) crackles     (  ) wheezing      (  ) rhonchi      (specify location:       )    ABDOMEN:   ( x ) Soft     (  ) tense   |   ( x ) nondistended     (  ) distended   |   ( x ) +BS     (  ) hypoactive bowel sounds     (  ) hyperactive bowel sounds  ( x ) nontender     (  ) RUQ tenderness     (  ) RLQ tenderness     (  ) LLQ tenderness     (  ) epigastric tenderness     (  ) diffuse tenderness  (  ) Splenomegaly      (  ) Hepatomegaly      (  ) Jaundice     (  ) ecchymosis     EXTREMITIES:   (  ) Cyanosis    (  ) varicose veins    (  ) clubbing    (  ) gangrene:     (location:     )  (  ) pitting edema     (  ) non-pitting edema         SKIN:   (  ) rash:     (  ) pustules     (  ) vesicles     (  ) ulcer     (  ) ecchymosis     (specify location:     )    NERVOUS SYSTEM:  nonverbal  ( x ) A&Ox     (  ) confused     (  ) lethargic  CN II-XII:     (  ) grossly intact     (  ) deficits found     (Specify:     )   Upper extremities:     (  ) no sensorimotor deficits     (  ) weakness     (  ) loss of proprioception/vibration     (  ) loss of touch/temperature (specify:    )  Lower extremities:     (  ) no sensorimotor deficits     (  ) weakness     (  ) loss of proprioception/vibration     (  ) loss of touch/temperature (specify:    )    LABS:                        10.0   6.12  )-----------( 591      ( 29 Jan 2024 06:39 )             32.8     01-29    140  |  101  |  23<H>  ----------------------------<  146<H>  5.1<H>   |  30  |  0.7    Ca    8.4      29 Jan 2024 06:39  Mg     2.7     01-29    TPro  6.8  /  Alb  3.1<L>  /  TBili  0.2  /  DBili  x   /  AST  27  /  ALT  54<H>  /  AlkPhos  93  01-29      Urinalysis Basic - ( 29 Jan 2024 06:39 )    Color: x / Appearance: x / SG: x / pH: x  Gluc: 146 mg/dL / Ketone: x  / Bili: x / Urobili: x   Blood: x / Protein: x / Nitrite: x   Leuk Esterase: x / RBC: x / WBC x   Sq Epi: x / Non Sq Epi: x / Bacteria: x                  ECHO, ENDO, EEG, RAD:

## 2024-01-29 NOTE — SWALLOW BEDSIDE ASSESSMENT ADULT - SWALLOW EVAL: RECOMMENDED FEEDING/EATING TECHNIQUES
"Anesthesia Transfer of Care Note    Patient: Olvin Jones    Procedure(s) Performed: Procedure(s) (LRB):  EGD (ESOPHAGOGASTRODUODENOSCOPY) (N/A)    Patient location: GI    Anesthesia Type: general    Transport from OR: Transported from OR on room air with adequate spontaneous ventilation    Post pain: adequate analgesia    Post assessment: no apparent anesthetic complications    Post vital signs: stable    Level of consciousness: awake, alert and oriented    Nausea/Vomiting: no nausea/vomiting    Complications: none    Transfer of care protocol was followed      Last vitals: Visit Vitals  /68   Pulse 100   Temp 36.7 °C (98 °F)   Resp 16   Ht 5' 5.5" (1.664 m)   Wt 59.6 kg (131 lb 6.3 oz)   LMP 01/20/2024   SpO2 100%   Breastfeeding No   BMI 21.53 kg/m²     "
position upright (90 degrees)/small sips/bites
position upright (90 degrees)/small sips/bites
position upright (90 degrees)

## 2024-01-30 LAB
ALBUMIN SERPL ELPH-MCNC: 3.4 G/DL — LOW (ref 3.5–5.2)
ALP SERPL-CCNC: 105 U/L — SIGNIFICANT CHANGE UP (ref 30–115)
ALT FLD-CCNC: 42 U/L — HIGH (ref 0–41)
ANION GAP SERPL CALC-SCNC: 15 MMOL/L — HIGH (ref 7–14)
AST SERPL-CCNC: 32 U/L — SIGNIFICANT CHANGE UP (ref 0–41)
BASOPHILS # BLD AUTO: 0.07 K/UL — SIGNIFICANT CHANGE UP (ref 0–0.2)
BASOPHILS NFR BLD AUTO: 0.8 % — SIGNIFICANT CHANGE UP (ref 0–1)
BILIRUB SERPL-MCNC: 0.2 MG/DL — SIGNIFICANT CHANGE UP (ref 0.2–1.2)
BLD GP AB SCN SERPL QL: SIGNIFICANT CHANGE UP
BUN SERPL-MCNC: 26 MG/DL — HIGH (ref 10–20)
CALCIUM SERPL-MCNC: 8.7 MG/DL — SIGNIFICANT CHANGE UP (ref 8.4–10.5)
CHLORIDE SERPL-SCNC: 103 MMOL/L — SIGNIFICANT CHANGE UP (ref 98–110)
CO2 SERPL-SCNC: 25 MMOL/L — SIGNIFICANT CHANGE UP (ref 17–32)
CREAT SERPL-MCNC: 0.7 MG/DL — SIGNIFICANT CHANGE UP (ref 0.7–1.5)
EGFR: 101 ML/MIN/1.73M2 — SIGNIFICANT CHANGE UP
EOSINOPHIL # BLD AUTO: 0.15 K/UL — SIGNIFICANT CHANGE UP (ref 0–0.7)
EOSINOPHIL NFR BLD AUTO: 1.8 % — SIGNIFICANT CHANGE UP (ref 0–8)
GLUCOSE BLDC GLUCOMTR-MCNC: 110 MG/DL — HIGH (ref 70–99)
GLUCOSE BLDC GLUCOMTR-MCNC: 112 MG/DL — HIGH (ref 70–99)
GLUCOSE BLDC GLUCOMTR-MCNC: 113 MG/DL — HIGH (ref 70–99)
GLUCOSE BLDC GLUCOMTR-MCNC: 126 MG/DL — HIGH (ref 70–99)
GLUCOSE BLDC GLUCOMTR-MCNC: 63 MG/DL — LOW (ref 70–99)
GLUCOSE SERPL-MCNC: 175 MG/DL — HIGH (ref 70–99)
HCT VFR BLD CALC: 36.1 % — LOW (ref 37–47)
HGB BLD-MCNC: 11 G/DL — LOW (ref 12–16)
IMM GRANULOCYTES NFR BLD AUTO: 0.7 % — HIGH (ref 0.1–0.3)
LYMPHOCYTES # BLD AUTO: 1.35 K/UL — SIGNIFICANT CHANGE UP (ref 1.2–3.4)
LYMPHOCYTES # BLD AUTO: 16.3 % — LOW (ref 20.5–51.1)
MAGNESIUM SERPL-MCNC: 2.5 MG/DL — HIGH (ref 1.8–2.4)
MCHC RBC-ENTMCNC: 25 PG — LOW (ref 27–31)
MCHC RBC-ENTMCNC: 30.5 G/DL — LOW (ref 32–37)
MCV RBC AUTO: 82 FL — SIGNIFICANT CHANGE UP (ref 81–99)
MONOCYTES # BLD AUTO: 0.49 K/UL — SIGNIFICANT CHANGE UP (ref 0.1–0.6)
MONOCYTES NFR BLD AUTO: 5.9 % — SIGNIFICANT CHANGE UP (ref 1.7–9.3)
NEUTROPHILS # BLD AUTO: 6.16 K/UL — SIGNIFICANT CHANGE UP (ref 1.4–6.5)
NEUTROPHILS NFR BLD AUTO: 74.5 % — SIGNIFICANT CHANGE UP (ref 42.2–75.2)
NRBC # BLD: 0 /100 WBCS — SIGNIFICANT CHANGE UP (ref 0–0)
PLATELET # BLD AUTO: 596 K/UL — HIGH (ref 130–400)
PMV BLD: 10.6 FL — HIGH (ref 7.4–10.4)
POTASSIUM SERPL-MCNC: 5.1 MMOL/L — HIGH (ref 3.5–5)
POTASSIUM SERPL-SCNC: 5.1 MMOL/L — HIGH (ref 3.5–5)
PROT SERPL-MCNC: 7.2 G/DL — SIGNIFICANT CHANGE UP (ref 6–8)
RBC # BLD: 4.4 M/UL — SIGNIFICANT CHANGE UP (ref 4.2–5.4)
RBC # FLD: 19.9 % — HIGH (ref 11.5–14.5)
SODIUM SERPL-SCNC: 143 MMOL/L — SIGNIFICANT CHANGE UP (ref 135–146)
WBC # BLD: 8.28 K/UL — SIGNIFICANT CHANGE UP (ref 4.8–10.8)
WBC # FLD AUTO: 8.28 K/UL — SIGNIFICANT CHANGE UP (ref 4.8–10.8)

## 2024-01-30 PROCEDURE — 99232 SBSQ HOSP IP/OBS MODERATE 35: CPT

## 2024-01-30 PROCEDURE — 71045 X-RAY EXAM CHEST 1 VIEW: CPT | Mod: 26

## 2024-01-30 RX ORDER — SODIUM CHLORIDE 0.65 %
1 AEROSOL, SPRAY (ML) NASAL DAILY
Refills: 0 | Status: DISCONTINUED | OUTPATIENT
Start: 2024-01-30 | End: 2024-02-09

## 2024-01-30 RX ADMIN — GABAPENTIN 300 MILLIGRAM(S): 400 CAPSULE ORAL at 17:16

## 2024-01-30 RX ADMIN — Medication 1 APPLICATION(S): at 17:16

## 2024-01-30 RX ADMIN — RALOXIFENE HYDROCHLORIDE 60 MILLIGRAM(S): 60 TABLET, COATED ORAL at 11:33

## 2024-01-30 RX ADMIN — GABAPENTIN 300 MILLIGRAM(S): 400 CAPSULE ORAL at 05:15

## 2024-01-30 RX ADMIN — APIXABAN 5 MILLIGRAM(S): 2.5 TABLET, FILM COATED ORAL at 17:16

## 2024-01-30 RX ADMIN — LEVETIRACETAM 500 MILLIGRAM(S): 250 TABLET, FILM COATED ORAL at 05:14

## 2024-01-30 RX ADMIN — PANTOPRAZOLE SODIUM 40 MILLIGRAM(S): 20 TABLET, DELAYED RELEASE ORAL at 05:14

## 2024-01-30 RX ADMIN — Medication 3 MILLILITER(S): at 13:44

## 2024-01-30 RX ADMIN — SENNA PLUS 2 TABLET(S): 8.6 TABLET ORAL at 21:48

## 2024-01-30 RX ADMIN — Medication 1 TABLET(S): at 05:15

## 2024-01-30 RX ADMIN — Medication 10 MILLILITER(S): at 17:16

## 2024-01-30 RX ADMIN — Medication 1 DROP(S): at 05:16

## 2024-01-30 RX ADMIN — Medication 10 MILLILITER(S): at 00:13

## 2024-01-30 RX ADMIN — Medication 1 DROP(S): at 17:17

## 2024-01-30 RX ADMIN — Medication 1 TABLET(S): at 17:16

## 2024-01-30 RX ADMIN — APIXABAN 5 MILLIGRAM(S): 2.5 TABLET, FILM COATED ORAL at 05:15

## 2024-01-30 RX ADMIN — Medication 10 MILLILITER(S): at 11:33

## 2024-01-30 RX ADMIN — MIDODRINE HYDROCHLORIDE 10 MILLIGRAM(S): 2.5 TABLET ORAL at 21:48

## 2024-01-30 RX ADMIN — Medication 3 MILLIGRAM(S): at 21:48

## 2024-01-30 RX ADMIN — Medication 3 MILLILITER(S): at 08:15

## 2024-01-30 RX ADMIN — LEVETIRACETAM 500 MILLIGRAM(S): 250 TABLET, FILM COATED ORAL at 17:16

## 2024-01-30 RX ADMIN — MIDODRINE HYDROCHLORIDE 10 MILLIGRAM(S): 2.5 TABLET ORAL at 14:52

## 2024-01-30 RX ADMIN — Medication 10 MILLILITER(S): at 05:14

## 2024-01-30 RX ADMIN — Medication 3 MILLILITER(S): at 20:40

## 2024-01-30 RX ADMIN — Medication 1 APPLICATION(S): at 05:16

## 2024-01-30 RX ADMIN — CHLORHEXIDINE GLUCONATE 1 APPLICATION(S): 213 SOLUTION TOPICAL at 11:33

## 2024-01-30 RX ADMIN — MIDODRINE HYDROCHLORIDE 10 MILLIGRAM(S): 2.5 TABLET ORAL at 05:15

## 2024-01-30 RX ADMIN — Medication 25 MILLIGRAM(S): at 05:15

## 2024-01-30 RX ADMIN — POLYETHYLENE GLYCOL 3350 17 GRAM(S): 17 POWDER, FOR SOLUTION ORAL at 21:48

## 2024-01-30 NOTE — SWALLOW FEES ASSESSMENT ADULT - SLP GENERAL OBSERVATIONS
pt received in bed awake alert in no apparent pain. +NGT in place +room air +baseline audible rhonchi, chest congestion noted

## 2024-01-30 NOTE — SWALLOW FEES ASSESSMENT ADULT - COMMENTS
+dried thick phlegm/mucus noted t/o hypopharynx; +thick white mucus noted at first tracheal ring which pt was unable to clear despite cough

## 2024-01-30 NOTE — PROGRESS NOTE ADULT - SUBJECTIVE AND OBJECTIVE BOX
Patient is a 58y old  Female who presents with a chief complaint of Sepsis      SUBJECTIVE / OVERNIGHT EVENTS: Pt is doing fine. Has been 100% on 2L and asked them to remove the NC as they continue to keep placing it on her.   ADDITIONAL REVIEW OF SYSTEMS: as above    MEDICATIONS  (STANDING):  albuterol    90 MICROgram(s) HFA Inhaler 2 Puff(s) Inhalation two times a day  albuterol/ipratropium for Nebulization 3 milliLiter(s) Nebulizer every 6 hours  apixaban 5 milliGRAM(s) Oral two times a day  artificial  tears Solution 1 Drop(s) Both EYES two times a day  calcium carbonate   1250 mG (OsCal) 1 Tablet(s) Oral two times a day  chlorhexidine 2% Cloths 1 Application(s) Topical daily  gabapentin 300 milliGRAM(s) Oral every 12 hours  levETIRAcetam  Solution 500 milliGRAM(s) Oral every 12 hours  melatonin 3 milliGRAM(s) Oral at bedtime  meropenem  IVPB 1000 milliGRAM(s) IV Intermittent every 8 hours  midodrine. 10 milliGRAM(s) Oral every 8 hours  pantoprazole  Injectable 40 milliGRAM(s) IV Push every 24 hours  polyethylene glycol 3350 17 Gram(s) Oral at bedtime  raloxifene 60 milliGRAM(s) Oral daily  senna 2 Tablet(s) Oral at bedtime  silver sulfADIAZINE 1% Cream 1 Application(s) Topical two times a day    MEDICATIONS  (PRN):  acetaminophen     Tablet .. 650 milliGRAM(s) Oral every 6 hours PRN Temp greater or equal to 38C (100.4F), Mild Pain (1 - 3)  aluminum hydroxide/magnesium hydroxide/simethicone Suspension 30 milliLiter(s) Oral every 4 hours PRN Dyspepsia  ondansetron Injectable 4 milliGRAM(s) IV Push every 8 hours PRN Nausea and/or Vomiting      CAPILLARY BLOOD GLUCOSE        I&O's Summary    23 Jan 2024 07:01  -  24 Jan 2024 07:00  --------------------------------------------------------  IN: 50 mL / OUT: 40 mL / NET: 10 mL    24 Jan 2024 07:01  -  24 Jan 2024 18:17  --------------------------------------------------------  IN: 50 mL / OUT: 55 mL / NET: -5 mL        PHYSICAL EXAM:  Vital Signs Last 24 Hrs  T(C): 36.8 (24 Jan 2024 12:00), Max: 36.8 (24 Jan 2024 12:00)  T(F): 98.3 (24 Jan 2024 12:00), Max: 98.3 (24 Jan 2024 12:00)  HR: 112 (24 Jan 2024 15:04) (88 - 117)  BP: 115/55 (24 Jan 2024 15:04) (99/50 - 160/66)  BP(mean): --  RR: 18 (24 Jan 2024 12:00) (18 - 27)  SpO2: 95% (24 Jan 2024 05:00) (95% - 97%)    Parameters below as of 24 Jan 2024 05:00  Patient On (Oxygen Delivery Method): nasal cannula  O2 Flow (L/min): 2    CONSTITUTIONAL: Shaking  ENMT: dry oral mucosa, no pharyngeal injection or exudates; NGT in place  NECK: Supple, no palpable masses; no thyromegaly  RESPIRATORY: Normal respiratory effort; mild rhonchi in b/l bases  CARDIOVASCULAR: Regular rate and rhythm, normal S1 and S2, no murmur/rub/gallop; Peripheral pulses are 2+ bilaterally  ABDOMEN: difficult to examine due to contracted lower extremities  MUSCULOSKELETAL: contracted extremities, Complete Rt 1st and partial 2nd toe amputation  PSYCH: non verbal, developmental delays  SKIN: stage 1 sacral wound at gluteal folds    LABS:                        8.9    3.10  )-----------( 269      ( 24 Jan 2024 07:57 )             28.8     01-24    140  |  99  |  13  ----------------------------<  106<H>  3.8   |  30  |  0.6<L>    Ca    8.1<L>      24 Jan 2024 07:57  Mg     2.3     01-24    TPro  6.0  /  Alb  2.9<L>  /  TBili  <0.2  /  DBili  x   /  AST  29  /  ALT  19  /  AlkPhos  69  01-24          Urinalysis Basic - ( 24 Jan 2024 07:57 )    Color: x / Appearance: x / SG: x / pH: x  Gluc: 106 mg/dL / Ketone: x  / Bili: x / Urobili: x   Blood: x / Protein: x / Nitrite: x   Leuk Esterase: x / RBC: x / WBC x   Sq Epi: x / Non Sq Epi: x / Bacteria: x        Culture - Blood (collected 22 Jan 2024 16:51)  Source: .Blood None  Preliminary Report (24 Jan 2024 01:04):    No growth at 24 hours        RADIOLOGY & ADDITIONAL TESTS:  Results Reviewed:   Imaging Personally Reviewed:  Electrocardiogram Personally Reviewed:    COORDINATION OF CARE:  Care Discussed with Consultants/Other Providers [Y/N]:  Prior or Outpatient Records Reviewed [Y/N]:

## 2024-01-30 NOTE — PROVIDER CONTACT NOTE (HYPOGLYCEMIA EVENT) - NS PROVIDER CONTACT BACKGROUND-HYPO
Age: 58y    Gender: Female    POCT Blood Glucose:  110 mg/dL (01-30-24 @ 08:47)  63 mg/dL (01-30-24 @ 08:03)  129 mg/dL (01-29-24 @ 21:11)  160 mg/dL (01-29-24 @ 16:30)      eMAR:   Patient blood sugar 63 @8:03, pt given apple juice for correction.  Patient admitted for respiratory distress.  Patient nonverbal no signs of symptoms of hypoglycemia.  Blood sugar went up to 110.

## 2024-01-30 NOTE — SWALLOW FEES ASSESSMENT ADULT - ADDITIONAL RECOMMENDATIONS
humidification, chest PT, therapeutic PO trials w/ SLP only humidification, chest PT, therapeutic PO trials of water w/ SLP only

## 2024-01-30 NOTE — PROGRESS NOTE ADULT - ASSESSMENT
58F w/ PMHx Down Syndrome, nonverbal at baseline, DVT on eliquis 5mg BID,  Hypothyroidism, aspiration pneumonia in prev admissions, Cerebral palsy and Seizure Disorders presents to the ED from nursing facility[facility]group home ([facility]) presented to ED for respiratory distress and high grade fever. Patient found to be septic on admission. Admitted for management of acute respiratory distress likely 2/2 CAP or aspiration PNA.    #Sepsis POA(fever, tachy and leukocytosis)  #Acute hypoxic resp failure 2/2 Aspiration PNA or RSV bronchiolitis  #h/o Dysphagia - Puree Diet at baseline  - VBG Lactate 2.1 improved to wnl, procal 0.04 pH wnl and CO2 mildly elevated 61  - s/p 2L LR bolus  - previously fungitell +ve s/p Caspofungin on previous admission  - CXR shows- stable b/l pulmonary opacities  - MRSA neg - vanc dcd (1/22)  - BCx (1/20): Staph hominis repeat BCx have been NGTD  - UCx: neg  - completed abx on 1/27  - RVP- RSV detected  - MRSA swab neg, urine strep neg, urine legionella neg, fungitell positive at 325  - d-dimer 605 and LE duplex neg  - midodrine 10mg tid   - pt was tachycardic intermittently so added lopressor 25mg bid; can stop if pt becomes bradycardic but currently well controlled  - increased FWF  - per SLP failed FEES continue NPO w/ NGT, may benefit from humidification, chest PT, therapeutic PO trials w/ SLP only    #Elevated Troponin (stable at 35)  - Trop 25>37>35>35  - EKG shows tachycardia, p waves present, likely sinus tach w/ a lot of artifact from shaking    #Acute on Chronic Normocytic Anemia (stable)  - Hgb 8.9 (previously 9.5)  - INR 1.43  - no evidence of hemorrhage  - b12 normal, folate normal, iron studies (ferritin 128)  - monitor H&H  - transfuse PRN, keep hgb >7    #h/o DVT of L Common Femoral Vein  - on Eliquis 5mg BID at home  - LE duplex neg    #Seizure Disorder  - on IV Keppra 500mg BID    #Hypothyroidism  - not on any medications at home  - TSH- 0.51    #h/o Right Foot OM (1st toe stump and 2nd distal phalanx)  #h/o Multiple Left Foot deep tissue injuries  #h/o Sacral Wound  - Patient underwent excisional debridement of ulcer of right foot (including 1st metatarsal) and partial amputation 2nd toe on previous admissions  - past wound cultures grew Proteus and ESBL E coli  - reposition q2hrs to prevent pressure injury  - local wound care  - no acute surgical interventions from podiatry and burn    #Down Syndrome  #Cerebral Palsy  #Non-Verbal    --------------------------------------------------  #DVT prophylaxis: eliquis   #GI prophylaxis: protonix  #MRSA prophylaxis: chlorhexidine 2% cloths  #Diet: NPO with tube feeds  #Activity: ambulate as tolerated  #Code status: full code  #Disposition: acute  --------------------------------------------------  #Handoff: see points bolded above and any points listed below  - discharge back to Group Home [facility]) once able to tolerate po diet or other intervention is made

## 2024-01-30 NOTE — CHART NOTE - NSCHARTNOTEFT_GEN_A_CORE
Registered Dietitian Follow-Up     Patient Profile Reviewed                           Yes [X]   No []     Nutrition History Previously Obtained        Yes []  No [X]  Pt's aide present at bedside to provide hx. Pt was on puree thin liquids at group home, took a womens MVI. Was not sure of pt's UBW.      Pertinent Subjective Information: Pt nonverbal, asleep     Pertinent Medical Interventions:     58F w/ PMHx Down Syndrome, nonverbal at baseline, DVT on eliquis 5mg BID,  Hypothyroidism, aspiration pneumonia in prev admissions, Cerebral palsy and Seizure Disorders presents [facility] the ED [facility] presented to ED for respiratory distress and high grade fever. Patient found to be septic on admission. Admitted for management of acute respiratory distress likely 2/2 CAP or aspiration PNA.    #Sepsis POA(fever, tachy and leukocytosis)  #Acute hypoxic resp failure 2/2 Aspiration PNA or RSV bronchiolitis  #h/o Dysphagia - Puree Diet at baseline  - SLP unable to perform FEES today, will follow up tomorrow, keep NPO for now  #Acute on Chronic Normocytic Anemia (stable)  #h/o Right Foot OM (1st toe stump and 2nd distal phalanx)  #h/o Multiple Left Foot deep tissue injuries  #h/o Sacral Wound  - Patient underwent excisional debridement of ulcer of right foot (including 1st metatarsal) and partial amputation 2nd toe on previous admissions  - past wound cultures grew Proteus and ESBL E coli  - reposition q2hrs to prevent pressure injury  - local wound care  - no acute surgical interventions from podiatry and burn    Diet order:   Diet, NPO with Tube Feed:   Tube Feeding Modality: Nasogastric  Jevity 1.2 Juventino  Total Volume for 24 Hours (mL): 1080  Bolus  Total Volume of Bolus (mL):  270  Tube Feed Frequency: Every 6 hours   Tube Feed Start Time: 14:00  Bolus Feed Rate (mL per Hour): 80   Bolus Feed Duration (in Hours): 1.5  Free Water Flush Instructions:  75 cc flush with water pre and post feeds  No Carb Prosource TF     Qty per Day:  1 (01-24-24 @ 14:34) [Active]    Anthropometrics:  Height (cm): 136.4 (01-24-24 @ 09:52)  IBW: 45.5 kg  UBW: unknown    Daily   % Weight Change    48.9 kg (1/30/2024 via RD bedscale)  52.3 kg (1/21/2024)    6.5% wt loss x 1 week --> significant, however, weight obtained via RD bedscale    Patient's weight hx at the group home provided upon last admission: From January 2023 to Oct 2023 weight fluctuated from 115 - 119 lbs. Weight last admission Oct 23 was 110 lb. CBW is 115 lb (52.3 kg). Weight stable.    MEDICATIONS  (STANDING):  apixaban 5 milliGRAM(s) Oral two times a day  calcium carbonate   1250 mG (OsCal) 1 Tablet(s) Oral two times a day  melatonin 3 milliGRAM(s) Oral at bedtime  metoprolol tartrate 25 milliGRAM(s) Oral every 12 hours  midodrine. 10 milliGRAM(s) Oral every 8 hours  pantoprazole  Injectable 40 milliGRAM(s) IV Push every 24 hours  polyethylene glycol 3350 17 Gram(s) Oral at bedtime  raloxifene 60 milliGRAM(s) Oral daily  senna 2 Tablet(s) Oral at bedtime    MEDICATIONS  (PRN):  aluminum hydroxide/magnesium hydroxide/simethicone Suspension 30 milliLiter(s) Oral every 4 hours PRN Dyspepsia  ondansetron Injectable 4 milliGRAM(s) IV Push every 8 hours PRN Nausea and/or Vomiting    Pertinent Labs: 01-30 @ 07:17: Na 143, BUN 26<H>, Cr 0.7, <H>, K+ 5.1<H>, Phos --, Mg 2.5<H>, Alk Phos 105, ALT/SGPT 42<H>, AST/SGOT 32, HbA1c --    Finger Sticks:  POCT Blood Glucose.: 112 mg/dL (01-30 @ 11:12)  POCT Blood Glucose.: 110 mg/dL (01-30 @ 08:47)  POCT Blood Glucose.: 63 mg/dL (01-30 @ 08:03)  POCT Blood Glucose.: 129 mg/dL (01-29 @ 21:11)  POCT Blood Glucose.: 160 mg/dL (01-29 @ 16:30)    Physical Findings:  - Appearance: pale, asleep  - GI function: last bowel movement 1/27 per flowsheets  - Tubes: NGT  - Oral/Mouth cavity: NPO, SLP unable to assess  - Skin: Sacral fissure, right lateral foot wound, h/o deep tissue injuries to left foot  Pressure injuries: L heel, R butt, L butt all unstageable  - Edema: no edema     Nutrition Requirements: considering sepsis, wounds  Weight Used:     Estimated Energy Needs    Continue []  Adjust [X]  Adjusted Energy Recommendations:  1392-1763 kcal/day (MSJ x 1.4-1.6)        Estimated Protein Needs    Continue [X]  Adjust []  Adjusted Protein Recommendations: 68-78 gm/day (1.3-1.5 g/kg)        Estimated Fluid Needs        Continue [X]  Adjust []  Adjusted Fluid Recommendations: 3654-5943 mL/day (25-30 kcal/kg)     Nutrient Intake: pt receiving TFs at goal rate provides 1296 kcal, 60 g protein, 870 mL free water + additional 600 mL free water from water flushes  Pt also receiving prosource TF once daily provides additional 40 kcal and 11 g protein    [] Previous Nutrition Diagnosis: Inadequate enteral nutrition infusion            [] Ongoing          [X] Resolved    [] No active nutrition diagnosis identified at this time    Current Nutrition dx: Swallowing difficulty related to AMS, SLP unable to assess as evidenced by pt continues to be NPO with NGT feedings.     Nutrition Education:      Goal/Expected Outcome: Pt to continue tolerate EN and meet % of energy and protein needs in 5-7 days     Indicator/Monitoring:     Recommendation:  Recommend change EN due to suspected wt loss from prolonged hospitalization - pt still with sepsis and multiple PIs.  Recommend Jevity 1.2 via NG tube 300 mL q6h - provides 1440 kcal, 67 g protein, 967 mL free water. Flush with 50 mL water pre/post feeds - team to adjust prn  Recommend continue Prosource TF once daily - provides additional 40 kcal and 11 g protein  Please monitor GI symptoms, keep aspiration precautions, and HOB elevated > 45 degrees  Recommend advance diet per SLP recommendations    Smitha Buchanan RD [phone] or via Teams

## 2024-01-30 NOTE — PROGRESS NOTE ADULT - ASSESSMENT
58F w/ PMHx Down Syndrome, nonverbal at baseline, DVT on eliquis 5mg BID,  Hypothyroidism, aspiration pneumonia in prev admissions, Cerebral palsy and Seizure Disorders presents [facility] the ED [facility] presented to ED for respiratory distress and high grade fever. Patient found to be septic on admission. Admitted for management of acute respiratory distress likely 2/2 CAP or aspiration PNA.    #Sepsis POA(fever, tachy and leukocytosis)  #Acute hypoxic resp failure 2/2 Aspiration PNA or RSV bronchiolitis  #h/o Dysphagia - Puree Diet at baseline  - VBG Lactate 2.1 improved to wnl, procal 0.04 pH wnl and CO2 mildly elevated 61  - s/p 2L LR bolus  - previously fungitell +ve s/p Caspofungin on previous admission  - CXR shows- stable b/l pulmonary opacities  - MRSA neg - vanc dcd (1/22)  - BCx (1/20): Staph hominis repeat BCx have been NGTD  - UCx: neg  - completed abx on 1/27  - RVP- RSV detected  - MRSA swab neg, urine strep neg, urine legionella neg, fungitell positive at 325  - d-dimer 605 and LE duplex neg  - aspiration precautions  - NG tube placement   - currently on tube feeds  - SLP: npo by mouth  - midodrine 10mg tid   - pt was tachycardic intermittently so added lopressor 25mg bid; can stop if pt becomes bradycardic but currently well controlled  - increased FWF  - f/u on tube feeds with RD   - resume eliquis via NGT  - SLP re-evaluated pt on 1/29 and recommended FEES; will speak to advisory board. Left VM and callback number.  - removed NC as pt's SAO2 has been 100%  - SLP to do a repeat eval later this week  - chest physiotherapy  - ID reconsulted for positive fungitell    #Elevated Troponin (stable at 35)  - Trop 25>37>35>35  - EKG shows tachycardia, p waves present, likely sinus tach w/ a lot of artifact from shaking    #Acute on Chronic Normocytic Anemia (stable)  - Hgb 8.9 (previously 9.5)  - INR 1.43  - no evidence of hemorrhage  - b12 normal, folate normal, iron studies(ferritin 128)  - monitor H&H  - transfuse PRN, keep hgb >7    #h/o DVT of L Common Femoral Vein  - on Eliquis 5mg BID at home  - LE duplex neg    #Seizure Disorder  - on IV Keppra 500mg BID    #Hypothyroidism  - not on any medications at home  - TSH- 0.51    #h/o Right Foot OM (1st toe stump and 2nd distal phalanx)  #h/o Multiple Left Foot deep tissue injuries  #h/o Sacral Wound  - Patient underwent excisional debridement of ulcer of right foot (including 1st metatarsal) and partial amputation 2nd toe on previous admissions  - past wound cultures grew Proteus and ESBL E coli  - reposition q2hrs to prevent pressure injury  - local wound care  - no acute surgical interventions from podiatry and burn    #Down Syndrome  #Cerebral Palsy  #Non-Verbal    #DVT ppx: on Eliquis 5mg PO BID   #GI ppx: Protonix 40mg IV daily  #Diet: tube feeds  #Activity: IAT   #Code Status: Full Code  #Dispo: from Group Home ([facility])

## 2024-01-30 NOTE — PROGRESS NOTE ADULT - SUBJECTIVE AND OBJECTIVE BOX
Interval events:    Patient is a 58y old Female who presents with a chief complaint of Respiratory Distress (30 Jan 2024 16:05)    Primary diagnosis of Sepsis with acute hypoxic respiratory failure    Today is hospital day 10d  This morning patient was seen and examined at bedside  History was limited because at their current baseline, the patient is: nonverbal  Per aide at bedside no complaints    Code Status:  see end of document    Family communication:  Contact date:  Name of person contacted:  Relationship to patient:  Communication details:  What matters most:    PAST MEDICAL & SURGICAL HISTORY  Down syndrome    Osteoporosis    Mild anemia    Neuropathy    S/P debridement  of R hip on 3/2/21      SOCIAL HISTORY:  Social History:      ALLERGIES:  No Known Allergies    MEDICATIONS:  STANDING MEDICATIONS  albuterol    90 MICROgram(s) HFA Inhaler 2 Puff(s) Inhalation two times a day  albuterol/ipratropium for Nebulization 3 milliLiter(s) Nebulizer every 6 hours  apixaban 5 milliGRAM(s) Oral two times a day  artificial  tears Solution 1 Drop(s) Both EYES two times a day  calcium carbonate   1250 mG (OsCal) 1 Tablet(s) Oral two times a day  chlorhexidine 2% Cloths 1 Application(s) Topical daily  gabapentin 300 milliGRAM(s) Oral every 12 hours  guaifenesin/dextromethorphan Oral Liquid 10 milliLiter(s) Oral four times a day  levETIRAcetam  Solution 500 milliGRAM(s) Oral every 12 hours  melatonin 3 milliGRAM(s) Oral at bedtime  metoprolol tartrate 25 milliGRAM(s) Oral every 12 hours  midodrine. 10 milliGRAM(s) Oral every 8 hours  pantoprazole  Injectable 40 milliGRAM(s) IV Push every 24 hours  polyethylene glycol 3350 17 Gram(s) Oral at bedtime  raloxifene 60 milliGRAM(s) Oral daily  senna 2 Tablet(s) Oral at bedtime  silver sulfADIAZINE 1% Cream 1 Application(s) Topical two times a day    PRN MEDICATIONS  acetaminophen     Tablet .. 650 milliGRAM(s) Oral every 6 hours PRN  aluminum hydroxide/magnesium hydroxide/simethicone Suspension 30 milliLiter(s) Oral every 4 hours PRN  ondansetron Injectable 4 milliGRAM(s) IV Push every 8 hours PRN    VITALS:   T(F): 96.8  HR: 91  BP: 93/50  RR: 18  SpO2: 100%    PHYSICAL EXAM:  GENERAL:   ( x ) NAD, lying in bed comfortably     (  ) obtunded     (  ) lethargic     (  ) somnolent    HEAD:   ( x ) Atraumatic     (  ) hematoma     (  ) laceration (specify location:       )     NECK:  ( x ) Supple     (  ) neck stiffness     (  ) nuchal rigidity     (  )  no JVD     (  ) JVD present ( -- cm)    CHEST:  (  ) Chest wall tenderness (specify location:       )    HEART:  Rate -->     ( x ) normal rate     (  ) bradycardic     (  ) tachycardic  Rhythm -->     ( x ) regular     (  ) regularly irregular     (  ) irregularly irregular  Murmurs -->     ( x ) normal s1s2     (  ) systolic murmur     (  ) diastolic murmur     (  ) continuous murmur      (  ) S3 present     (  ) S4 present    LUNG:   ( x ) Unlabored respirations     (  ) tachypnea  ( x ) B/L air entry     (  ) decreased breath sounds in:  (location     )    ( x ) no adventitious sound     (  ) crackles     (  ) wheezing      (  ) rhonchi      (specify location:       )    ABDOMEN:   ( x ) Soft     (  ) tense   |   ( x ) nondistended     (  ) distended   |   ( x ) +BS     (  ) hypoactive bowel sounds     (  ) hyperactive bowel sounds  ( x ) nontender     (  ) RUQ tenderness     (  ) RLQ tenderness     (  ) LLQ tenderness     (  ) epigastric tenderness     (  ) diffuse tenderness  (  ) Splenomegaly      (  ) Hepatomegaly      (  ) Jaundice     (  ) ecchymosis     EXTREMITIES:   (  ) Cyanosis    (  ) varicose veins    (  ) clubbing    (  ) gangrene:     (location:     )  (  ) pitting edema     (  ) non-pitting edema         SKIN:   (  ) rash:     (  ) pustules     (  ) vesicles     (  ) ulcer     (  ) ecchymosis     (specify location:     )    NERVOUS SYSTEM:  nonverbal  ( x ) A&Ox     (  ) confused     (  ) lethargic  CN II-XII:     (  ) grossly intact     (  ) deficits found     (Specify:     )   Upper extremities:     (  ) no sensorimotor deficits     (  ) weakness     (  ) loss of proprioception/vibration     (  ) loss of touch/temperature (specify:    )  Lower extremities:     (  ) no sensorimotor deficits     (  ) weakness     (  ) loss of proprioception/vibration     (  ) loss of touch/temperature (specify:    )    LABS:                        11.0   8.28  )-----------( 596      ( 30 Jan 2024 07:17 )             36.1     01-30    143  |  103  |  26<H>  ----------------------------<  175<H>  5.1<H>   |  25  |  0.7    Ca    8.7      30 Jan 2024 07:17  Mg     2.5     01-30    TPro  7.2  /  Alb  3.4<L>  /  TBili  0.2  /  DBili  x   /  AST  32  /  ALT  42<H>  /  AlkPhos  105  01-30      Urinalysis Basic - ( 30 Jan 2024 07:17 )    Color: x / Appearance: x / SG: x / pH: x  Gluc: 175 mg/dL / Ketone: x  / Bili: x / Urobili: x   Blood: x / Protein: x / Nitrite: x   Leuk Esterase: x / RBC: x / WBC x   Sq Epi: x / Non Sq Epi: x / Bacteria: x                  ECHO, ENDO, EEG, RAD:

## 2024-01-30 NOTE — SWALLOW FEES ASSESSMENT ADULT - SLP PERTINENT HISTORY OF CURRENT PROBLEM
Pt is a 59 y/o F w/ PMHx: Down Syndrome, nonverbal at baseline, DVT on Eliquis 5mg BID, hypothyroidism, aspiration pneumonia in prev admissions, cerebral palsy and seizure d/o, presents to the ED from [facility] for respiratory distress and high grade fever. Pt found to be septic on admission, AHRF 2' aspiration PNA or RSV bronchitis. Pt well known to acute SLP services, hx of multiple instrumental swallow studies, most recently FEES 10/24/23, recs: puree, mildly thick liquids. Pt eventually upgraded at bedside to thin liquids when respiratory status improved.

## 2024-01-31 LAB
ALBUMIN SERPL ELPH-MCNC: 3.2 G/DL — LOW (ref 3.5–5.2)
ALBUMIN SERPL ELPH-MCNC: 3.3 G/DL — LOW (ref 3.5–5.2)
ALP SERPL-CCNC: 90 U/L — SIGNIFICANT CHANGE UP (ref 30–115)
ALP SERPL-CCNC: 93 U/L — SIGNIFICANT CHANGE UP (ref 30–115)
ALT FLD-CCNC: 30 U/L — SIGNIFICANT CHANGE UP (ref 0–41)
ALT FLD-CCNC: 34 U/L — SIGNIFICANT CHANGE UP (ref 0–41)
ANION GAP SERPL CALC-SCNC: 11 MMOL/L — SIGNIFICANT CHANGE UP (ref 7–14)
ANION GAP SERPL CALC-SCNC: 8 MMOL/L — SIGNIFICANT CHANGE UP (ref 7–14)
AST SERPL-CCNC: 21 U/L — SIGNIFICANT CHANGE UP (ref 0–41)
AST SERPL-CCNC: 23 U/L — SIGNIFICANT CHANGE UP (ref 0–41)
BASOPHILS # BLD AUTO: 0.1 K/UL — SIGNIFICANT CHANGE UP (ref 0–0.2)
BASOPHILS # BLD AUTO: 0.1 K/UL — SIGNIFICANT CHANGE UP (ref 0–0.2)
BASOPHILS NFR BLD AUTO: 1 % — SIGNIFICANT CHANGE UP (ref 0–1)
BASOPHILS NFR BLD AUTO: 1.1 % — HIGH (ref 0–1)
BILIRUB SERPL-MCNC: <0.2 MG/DL — SIGNIFICANT CHANGE UP (ref 0.2–1.2)
BILIRUB SERPL-MCNC: <0.2 MG/DL — SIGNIFICANT CHANGE UP (ref 0.2–1.2)
BUN SERPL-MCNC: 25 MG/DL — HIGH (ref 10–20)
BUN SERPL-MCNC: 26 MG/DL — HIGH (ref 10–20)
CALCIUM SERPL-MCNC: 8.6 MG/DL — SIGNIFICANT CHANGE UP (ref 8.4–10.5)
CALCIUM SERPL-MCNC: 8.8 MG/DL — SIGNIFICANT CHANGE UP (ref 8.4–10.5)
CHLORIDE SERPL-SCNC: 103 MMOL/L — SIGNIFICANT CHANGE UP (ref 98–110)
CHLORIDE SERPL-SCNC: 104 MMOL/L — SIGNIFICANT CHANGE UP (ref 98–110)
CO2 SERPL-SCNC: 27 MMOL/L — SIGNIFICANT CHANGE UP (ref 17–32)
CO2 SERPL-SCNC: 30 MMOL/L — SIGNIFICANT CHANGE UP (ref 17–32)
CREAT SERPL-MCNC: 0.6 MG/DL — LOW (ref 0.7–1.5)
CREAT SERPL-MCNC: 0.7 MG/DL — SIGNIFICANT CHANGE UP (ref 0.7–1.5)
EGFR: 101 ML/MIN/1.73M2 — SIGNIFICANT CHANGE UP
EGFR: 105 ML/MIN/1.73M2 — SIGNIFICANT CHANGE UP
EOSINOPHIL # BLD AUTO: 0.11 K/UL — SIGNIFICANT CHANGE UP (ref 0–0.7)
EOSINOPHIL # BLD AUTO: 0.15 K/UL — SIGNIFICANT CHANGE UP (ref 0–0.7)
EOSINOPHIL NFR BLD AUTO: 1.2 % — SIGNIFICANT CHANGE UP (ref 0–8)
EOSINOPHIL NFR BLD AUTO: 1.6 % — SIGNIFICANT CHANGE UP (ref 0–8)
GLUCOSE SERPL-MCNC: 123 MG/DL — HIGH (ref 70–99)
GLUCOSE SERPL-MCNC: 247 MG/DL — HIGH (ref 70–99)
HCT VFR BLD CALC: 30.7 % — LOW (ref 37–47)
HCT VFR BLD CALC: 33.8 % — LOW (ref 37–47)
HGB BLD-MCNC: 10.3 G/DL — LOW (ref 12–16)
HGB BLD-MCNC: 9.4 G/DL — LOW (ref 12–16)
IMM GRANULOCYTES NFR BLD AUTO: 0.2 % — SIGNIFICANT CHANGE UP (ref 0.1–0.3)
IMM GRANULOCYTES NFR BLD AUTO: 0.4 % — HIGH (ref 0.1–0.3)
LYMPHOCYTES # BLD AUTO: 1.66 K/UL — SIGNIFICANT CHANGE UP (ref 1.2–3.4)
LYMPHOCYTES # BLD AUTO: 1.98 K/UL — SIGNIFICANT CHANGE UP (ref 1.2–3.4)
LYMPHOCYTES # BLD AUTO: 17.6 % — LOW (ref 20.5–51.1)
LYMPHOCYTES # BLD AUTO: 20.5 % — SIGNIFICANT CHANGE UP (ref 20.5–51.1)
MAGNESIUM SERPL-MCNC: 2.5 MG/DL — HIGH (ref 1.8–2.4)
MAGNESIUM SERPL-MCNC: 2.6 MG/DL — HIGH (ref 1.8–2.4)
MCHC RBC-ENTMCNC: 24.5 PG — LOW (ref 27–31)
MCHC RBC-ENTMCNC: 24.9 PG — LOW (ref 27–31)
MCHC RBC-ENTMCNC: 30.5 G/DL — LOW (ref 32–37)
MCHC RBC-ENTMCNC: 30.6 G/DL — LOW (ref 32–37)
MCV RBC AUTO: 79.9 FL — LOW (ref 81–99)
MCV RBC AUTO: 81.8 FL — SIGNIFICANT CHANGE UP (ref 81–99)
MONOCYTES # BLD AUTO: 0.35 K/UL — SIGNIFICANT CHANGE UP (ref 0.1–0.6)
MONOCYTES # BLD AUTO: 0.82 K/UL — HIGH (ref 0.1–0.6)
MONOCYTES NFR BLD AUTO: 3.7 % — SIGNIFICANT CHANGE UP (ref 1.7–9.3)
MONOCYTES NFR BLD AUTO: 8.5 % — SIGNIFICANT CHANGE UP (ref 1.7–9.3)
NEUTROPHILS # BLD AUTO: 6.58 K/UL — HIGH (ref 1.4–6.5)
NEUTROPHILS # BLD AUTO: 7.15 K/UL — HIGH (ref 1.4–6.5)
NEUTROPHILS NFR BLD AUTO: 68.2 % — SIGNIFICANT CHANGE UP (ref 42.2–75.2)
NEUTROPHILS NFR BLD AUTO: 76 % — HIGH (ref 42.2–75.2)
NRBC # BLD: 0 /100 WBCS — SIGNIFICANT CHANGE UP (ref 0–0)
NRBC # BLD: 0 /100 WBCS — SIGNIFICANT CHANGE UP (ref 0–0)
PLATELET # BLD AUTO: 594 K/UL — HIGH (ref 130–400)
PLATELET # BLD AUTO: 632 K/UL — HIGH (ref 130–400)
PMV BLD: 10.5 FL — HIGH (ref 7.4–10.4)
PMV BLD: 10.6 FL — HIGH (ref 7.4–10.4)
POTASSIUM SERPL-MCNC: 4.7 MMOL/L — SIGNIFICANT CHANGE UP (ref 3.5–5)
POTASSIUM SERPL-MCNC: 5.5 MMOL/L — HIGH (ref 3.5–5)
POTASSIUM SERPL-SCNC: 4.7 MMOL/L — SIGNIFICANT CHANGE UP (ref 3.5–5)
POTASSIUM SERPL-SCNC: 5.5 MMOL/L — HIGH (ref 3.5–5)
PROT SERPL-MCNC: 6.7 G/DL — SIGNIFICANT CHANGE UP (ref 6–8)
PROT SERPL-MCNC: 6.9 G/DL — SIGNIFICANT CHANGE UP (ref 6–8)
RBC # BLD: 3.84 M/UL — LOW (ref 4.2–5.4)
RBC # BLD: 4.13 M/UL — LOW (ref 4.2–5.4)
RBC # FLD: 19.4 % — HIGH (ref 11.5–14.5)
RBC # FLD: 20.1 % — HIGH (ref 11.5–14.5)
SODIUM SERPL-SCNC: 141 MMOL/L — SIGNIFICANT CHANGE UP (ref 135–146)
SODIUM SERPL-SCNC: 142 MMOL/L — SIGNIFICANT CHANGE UP (ref 135–146)
WBC # BLD: 9.41 K/UL — SIGNIFICANT CHANGE UP (ref 4.8–10.8)
WBC # BLD: 9.65 K/UL — SIGNIFICANT CHANGE UP (ref 4.8–10.8)
WBC # FLD AUTO: 9.41 K/UL — SIGNIFICANT CHANGE UP (ref 4.8–10.8)
WBC # FLD AUTO: 9.65 K/UL — SIGNIFICANT CHANGE UP (ref 4.8–10.8)

## 2024-01-31 PROCEDURE — 93010 ELECTROCARDIOGRAM REPORT: CPT

## 2024-01-31 PROCEDURE — 99233 SBSQ HOSP IP/OBS HIGH 50: CPT

## 2024-01-31 PROCEDURE — 71045 X-RAY EXAM CHEST 1 VIEW: CPT | Mod: 26

## 2024-01-31 RX ORDER — SODIUM ZIRCONIUM CYCLOSILICATE 10 G/10G
10 POWDER, FOR SUSPENSION ORAL ONCE
Refills: 0 | Status: COMPLETED | OUTPATIENT
Start: 2024-01-31 | End: 2024-01-31

## 2024-01-31 RX ORDER — MIDODRINE HYDROCHLORIDE 2.5 MG/1
10 TABLET ORAL EVERY 8 HOURS
Refills: 0 | Status: DISCONTINUED | OUTPATIENT
Start: 2024-01-31 | End: 2024-02-03

## 2024-01-31 RX ADMIN — GABAPENTIN 300 MILLIGRAM(S): 400 CAPSULE ORAL at 17:52

## 2024-01-31 RX ADMIN — Medication 1 DROP(S): at 05:36

## 2024-01-31 RX ADMIN — Medication 1 APPLICATION(S): at 17:55

## 2024-01-31 RX ADMIN — CHLORHEXIDINE GLUCONATE 1 APPLICATION(S): 213 SOLUTION TOPICAL at 11:03

## 2024-01-31 RX ADMIN — GABAPENTIN 300 MILLIGRAM(S): 400 CAPSULE ORAL at 05:34

## 2024-01-31 RX ADMIN — SODIUM ZIRCONIUM CYCLOSILICATE 10 GRAM(S): 10 POWDER, FOR SUSPENSION ORAL at 11:59

## 2024-01-31 RX ADMIN — Medication 25 MILLIGRAM(S): at 05:35

## 2024-01-31 RX ADMIN — Medication 3 MILLILITER(S): at 08:21

## 2024-01-31 RX ADMIN — Medication 3 MILLILITER(S): at 19:48

## 2024-01-31 RX ADMIN — Medication 3 MILLILITER(S): at 13:32

## 2024-01-31 RX ADMIN — Medication 650 MILLIGRAM(S): at 12:19

## 2024-01-31 RX ADMIN — MIDODRINE HYDROCHLORIDE 10 MILLIGRAM(S): 2.5 TABLET ORAL at 05:36

## 2024-01-31 RX ADMIN — APIXABAN 5 MILLIGRAM(S): 2.5 TABLET, FILM COATED ORAL at 17:51

## 2024-01-31 RX ADMIN — Medication 1 DROP(S): at 17:55

## 2024-01-31 RX ADMIN — RALOXIFENE HYDROCHLORIDE 60 MILLIGRAM(S): 60 TABLET, COATED ORAL at 11:03

## 2024-01-31 RX ADMIN — MIDODRINE HYDROCHLORIDE 10 MILLIGRAM(S): 2.5 TABLET ORAL at 13:48

## 2024-01-31 RX ADMIN — LEVETIRACETAM 500 MILLIGRAM(S): 250 TABLET, FILM COATED ORAL at 05:35

## 2024-01-31 RX ADMIN — Medication 25 MILLIGRAM(S): at 17:53

## 2024-01-31 RX ADMIN — Medication 1 TABLET(S): at 17:51

## 2024-01-31 RX ADMIN — LEVETIRACETAM 500 MILLIGRAM(S): 250 TABLET, FILM COATED ORAL at 17:52

## 2024-01-31 RX ADMIN — Medication 1 APPLICATION(S): at 05:36

## 2024-01-31 RX ADMIN — Medication 1 TABLET(S): at 05:35

## 2024-01-31 RX ADMIN — PANTOPRAZOLE SODIUM 40 MILLIGRAM(S): 20 TABLET, DELAYED RELEASE ORAL at 05:35

## 2024-01-31 RX ADMIN — APIXABAN 5 MILLIGRAM(S): 2.5 TABLET, FILM COATED ORAL at 05:34

## 2024-01-31 NOTE — CHART NOTE - NSCHARTNOTEFT_GEN_A_CORE
Brief Nutrition Note     This writer was contacted by resident to adjust TFs as pt's potassium has been high.    Pt's Estimated Needs calculated 1/30:   2293-9178 kcal/day (MSJ x 1.4-1.6)  68-78 g protein/day (1.3-1.5 g/kg)  2704-7339 mL fluid /day (25-30 kcal/kg)    Recommend Nepro with carbsteady via NG tube - 270 mL q8hr provides 1458 kcal, 65 g protein, 588 mL free water -- flush with 100 mL pre/post feeds  Please provide additional 50 mL free water q4hr to ensure pt is meeting fluid needs - concentrated formula  Recommend continue Prosource TF once daily - provides additional 40 kcal and 11 g protein  Please monitor GI symptoms, keep aspiration precautions, and HOB elevated > 45 degrees    Smitha Buchanan RD [phone] or via teams

## 2024-01-31 NOTE — PROGRESS NOTE ADULT - PROVIDER SPECIALTY LIST ADULT
Message   Benign polyp - colonoscopy in 5 years     Verified Results  (1) TISSUE EXAM 26Apr2016 02:00PM Doc Cosmen     Test Name Result Flag Reference   LAB AP CASE REPORT (Report)     Surgical Pathology Report             Case: [id number]                   Authorizing Provider: Robel Price MD      Collected:      04/26/2016 1400        Ordering Location:   [address] [place] [address]   Received:      04/27/2016 [facility]                               Pathologist:      Alfrieda Homans, MD                             Specimen:  Polyp, Colorectal, Transverse colon polyp; cold bx   LAB AP FINAL DIAGNOSIS      A  Transverse colon polyp:    - Hyperplastic polyp     - Negative for dysplasia and malignancy  LAB AP NOTE      Interpretation performed at [facility], [address] [place] [address] [facility] (Report)     These tests were developed and their performance characteristics   determined by [address] [place] [address] or [employer]  They may not be cleared or approved by the [place]  Food and   Drug Administration  The FDA has determined that such clearance or   approval is not necessary  These tests are used for clinical purposes  They should not be regarded as investigational or for research  This   laboratory has been approved by CLIA 88, designated as a high-complexity   laboratory and is qualified to perform these tests  LAB AP GROSS DESCRIPTION (Report)     A  The specimen is received in formalin, labeled with the patient's name   and hospital number, and is designated transverse colon polyp biopsy  The specimen consists of 3 tan-pink soft tissue fragments measuring 0 2   centimeters, 0 3 centimeters, and 0 3 centimeters in greatest dimension  Entirely submitted  One cassette      Note: The estimated total formalin fixation time based upon information   provided by the submitting clinician and the standard processing schedule   is 24 0 hours    [facility]

## 2024-01-31 NOTE — PROGRESS NOTE ADULT - ASSESSMENT
58F w/ PMHx Down Syndrome, nonverbal at baseline, DVT on eliquis 5mg BID,  Hypothyroidism, aspiration pneumonia in prev admissions, Cerebral palsy and Seizure Disorders presents to the ED from nursing facility/group home ([facility] presented to ED for respiratory distress and high grade fever. Patient found to be septic on admission. Admitted for management of acute respiratory distress likely 2/2 CAP or aspiration PNA.    #Sepsis POA (fever, tachy and leukocytosis)  #Acute hypoxic resp failure 2/2 Aspiration PNA or RSV bronchiolitis  #history of Dysphagia - Puree Diet at baseline  - on admission VBG Lactate 2.1 improved to wnl, procal 0.04 pH wnl and CO2 mildly elevated 61  - s/p 2L LR bolus  - CXR shows- stable b/l pulmonary opacities  - MRSA negative   - BCx (1/20): Staph hominis repeat BCx have been NGTD  - UCx: neg  - RVP- RSV detected  - MRSA swab neg, urine strep neg, urine legionella neg  - d-dimer 605 and LE duplex neg  - fever 100.9 1/31 - repeat septic work up, possibly aspiration  - blood culture pending  - urine culture pending  - CXR pending   - fungitell +ve s/p Caspofungin on previous admission  - fungitell positive   PLAN  - ID recommendations pending  - SLP recommendations appreciated   - completed abx on 1/27  - per SLP failed FEES continue NPO w/ NGT, may benefit from humidification, chest PT, therapeutic PO trials w/ SLP only    #hypotension  - continue with home dose midodrine 10mg three times daily, hold for SBP >110    #Elevated Troponin (stable at 35)  #tachycardia despite completion of antibiotics course  - Trop 25>37>35>35  - EKG sinus tachycardia  - repeat EKG  - started on lopressor 25mg twice daily - if due to underlying infection consider discontinuing    #Acute on Chronic Normocytic Anemia (stable)  - Hgb 8.9 (previously 9.5)  - INR 1.43  - no evidence of hemorrhage  - b12 normal, folate normal, iron studies (ferritin 128)  - monitor H&H  - transfuse PRN, keep hgb >7    #h/o DVT of L Common Femoral Vein  - LE duplex neg  - on Eliquis 5mg BID at home    #Seizure Disorder  - Keppra 500mg twice daily     #Hypothyroidism  - not on any medications at home  - TSH- 0.51    #h/o Right Foot OM (1st toe stump and 2nd distal phalanx)  #h/o Multiple Left Foot deep tissue injuries  #h/o Sacral Wound  - past wound cultures grew Proteus and ESBL E coli  - Patient underwent excisional debridement of ulcer of right foot (including 1st metatarsal) and partial amputation 2nd toe on previous admissions  PLAN  - reposition q2hrs to prevent pressure injury  - local wound care  - no acute surgical interventions from podiatry and burn    #Down Syndrome  #Cerebral Palsy  #Non-Verbal    #misc  - gabapentin 300mg twice daily   - raloxifene 60mg daily     --------------------------------------------------  #DVT prophylaxis: eliquis   #GI prophylaxis: protonix  #MRSA prophylaxis: chlorhexidine 2% cloths  #Diet: NPO with tube feeds  #Activity: ambulate as tolerated  #Code status: full code  #Disposition: acute  --------------------------------------------------  #Handoff: see points bolded above and any points listed below  - discharge back to Group Home ([facility] once able to tolerate po diet or other intervention is made       58F w/ PMHx Down Syndrome, nonverbal at baseline, DVT on eliquis 5mg BID,  Hypothyroidism, aspiration pneumonia in prev admissions, Cerebral palsy and Seizure Disorders presents to the ED from nursing facility/group home ([facility]) presented to ED for respiratory distress and high grade fever. Patient found to be septic on admission. Admitted for management of acute respiratory distress likely 2/2 CAP or aspiration PNA.    #Sepsis POA (fever, tachy and leukocytosis)  #Acute hypoxic resp failure 2/2 Aspiration PNA or RSV bronchiolitis  #history of Dysphagia - Puree Diet at baseline  - on admission VBG Lactate 2.1 improved to wnl, procal 0.04 pH wnl and CO2 mildly elevated 61  - s/p 2L LR bolus  - CXR shows- stable b/l pulmonary opacities  - MRSA negative   - BCx (1/20): Staph hominis repeat BCx have been NGTD  - UCx: neg  - RVP- RSV detected  - MRSA swab neg, urine strep neg, urine legionella neg  - d-dimer 605 and LE duplex neg  - fever 100.9 1/31 - repeat septic work up, possibly aspiration  - blood culture pending  - urine culture pending  - CXR pending   - fungitell +ve s/p Caspofungin on previous admission  - fungitell positive   PLAN  - ID recommendations pending  - SLP recommendations appreciated   - completed abx on 1/27  - per SLP failed FEES continue NPO w/ NGT, may benefit from humidification, chest PT, therapeutic PO trials w/ SLP only    #hypotension  - continue with home dose midodrine 10mg three times daily, hold for SBP >110    #Elevated Troponin (stable at 35)  #tachycardia despite completion of antibiotics course  - Trop 25>37>35>35  - EKG sinus tachycardia  - repeat EKG unremarkable   - started on lopressor 25mg twice daily - if due to underlying infection consider discontinuing    #Acute on Chronic Normocytic Anemia (stable)  - Hgb 8.9 (previously 9.5)  - INR 1.43  - no evidence of hemorrhage  - b12 normal, folate normal, iron studies (ferritin 128)  - monitor H&H  - transfuse PRN, keep hgb >7    #h/o DVT of L Common Femoral Vein  - LE duplex neg  - on Eliquis 5mg BID at home    #Seizure Disorder  - Keppra 500mg twice daily     #Hypothyroidism  - not on any medications at home  - TSH- 0.51    #h/o Right Foot OM (1st toe stump and 2nd distal phalanx)  #h/o Multiple Left Foot deep tissue injuries  #h/o Sacral Wound  - past wound cultures grew Proteus and ESBL E coli  - Patient underwent excisional debridement of ulcer of right foot (including 1st metatarsal) and partial amputation 2nd toe on previous admissions  PLAN  - reposition q2hrs to prevent pressure injury  - local wound care  - no acute surgical interventions from podiatry and burn    #Down Syndrome  #Cerebral Palsy  #Non-Verbal    #misc  - gabapentin 300mg twice daily   - raloxifene 60mg daily     --------------------------------------------------  #DVT prophylaxis: eliquis   #GI prophylaxis: protonix  #MRSA prophylaxis: chlorhexidine 2% cloths  #Diet: NPO with tube feeds  #Activity: ambulate as tolerated  #Code status: full code  #Disposition: acute  --------------------------------------------------  #Handoff: see points bolded above and any points listed below  - discharge back to Group Home ([facility]) once able to tolerate po diet or other intervention is made

## 2024-01-31 NOTE — SWALLOW BEDSIDE ASSESSMENT ADULT - SLP GENERAL OBSERVATIONS
pt received in bed awake alert in no apparent pain. +NGT in place +room air +baseline audible rhonchi, w/ deep chest congestion noted +oral hygiene performed prior to PO trials. pt received in bed awake alert in no apparent pain. +NGT in place +room air +baseline audible rhonchi, w/ deep chest congestion noted +oral hygiene performed prior to PO trials. +GH aide at bedside

## 2024-01-31 NOTE — PROGRESS NOTE ADULT - ASSESSMENT
ASSESSMENT  58F w/ PMHx Down Syndrome, nonverbal at baseline, Hypothyroidism, Cerebral palsy and Seizure Disorders presents to the ED from nursing facility/group home presented to ED for respiratory distress and high grade fever.     IMPRESSION  #Acute hypoxic respiratory failure- Gram Negative pneumonia   #1/20 BCX 1/4 bottles : Staphylococcus hominis- contaminant   Repeat CX NG   #Severe Sepsis on admission  #Elevated fungitell   Fungitell: 325 (01-20-24 @ 21:23)  Fungitell: 138 (11-07-23 @ 08:08)  Fungitell: 115 (11-02-23 @ 11:40)  Fungitell: >500 pg/mL (10.17.23 @ 17:20) s/p empiric caspo   #Full thickness ulcer right heel- Appreciate burn/podiatry evaluation.  #History of Right planter foot ulcers - two full thickness ulcers - serous drainage with mild erythema with OM  - MR Foot No Cont, Right (10.16.23 @ 21:51): IMPRESSION: 1.  Limited exam. 2.  Osteomyelitis of the first metatarsal stump. 3.  Osteomyelitis of the second toe distal phalanx.  - s/p excidional debridement to and including bone 1st metatarsal with partial 2nd digit amputation - 1st metatarsal head resected - Wound Cx Proteus ESBL   #CKD 2-3  #History of buttock ulcer  #Down syndrome/Cerebral Palsy     RECOMMENDATIONS  -    If any questions, please send a message or call on [employer] Teams  Please continue to update ID with any pertinent new laboratory or radiographic findings.       ASSESSMENT  58F w/ PMHx Down Syndrome, nonverbal at baseline, Hypothyroidism, Cerebral palsy and Seizure Disorders presents to the ED from nursing facility/group home presented to ED for respiratory distress and high grade fever.     IMPRESSION  #Acute hypoxic respiratory failure- Gram Negative pneumonia   #1/20 BCX 1/4 bottles : Staphylococcus hominis- contaminant   Repeat CX NG   #Severe Sepsis on admission  #Elevated fungitell   Fungitell: 325 (01-20-24 @ 21:23)  Fungitell: 138 (11-07-23 @ 08:08)  Fungitell: 115 (11-02-23 @ 11:40)  Fungitell: >500 pg/mL (10.17.23 @ 17:20) s/p empiric caspo   #Full thickness ulcer right heel- Appreciate burn/podiatry evaluation.  #History of Right planter foot ulcers - two full thickness ulcers - serous drainage with mild erythema with OM  - MR Foot No Cont, Right (10.16.23 @ 21:51): IMPRESSION: 1.  Limited exam. 2.  Osteomyelitis of the first metatarsal stump. 3.  Osteomyelitis of the second toe distal phalanx.  - s/p excidional debridement to and including bone 1st metatarsal with partial 2nd digit amputation - 1st metatarsal head resected - Wound Cx Proteus ESBL   #CKD 2-3  #History of buttock ulcer  #Down syndrome/Cerebral Palsy     RECOMMENDATIONS  - Pt has had an elevated fungitell since 10/2023 without a clear source and s/p empiric caspo 10/20-10/26. Pt should not be at risk of invasive fungal infections. No evidence of candida on exam . Nonspecific test. Unclear why sent in the first place. Overall downtrending   - Fever (not documented), send BCX  - CXR  - Monitor off antibiotics   - if hemodynamic compromise, restart meropenem     If any questions, please send a message or call on [employer] Teams  Please continue to update ID with any pertinent new laboratory or radiographic findings.

## 2024-01-31 NOTE — PROGRESS NOTE ADULT - ATTENDING COMMENTS
Patient seen and examined independently. Agree with resident note with exceptions.     # Sepsis POA   # Lactic acidosis- resolved  # Acute hypoxic resp failure sec to RSV bronchiolitis and aspiration pneumonia  # H/o Dysphagia  - Xray Chest 1 View- PORTABLE-Urgent (Xray Chest 1 View- PORTABLE-Urgent .) (01.31.24 @ 14:52) >Basilar opacifications, left greater than right.  - RVP- RSV detected  - MRSA negative   - BCx (1/20): Staph hominis -  contaminant repeat BCx have been NGTD  -  Failed FEES --> c/w  NGtube feeds  - Aspiration precautions  - c/w duoneb  - repeat blood cultures for fever  - maintain oxygen sat > 94    # Hyperkalemia  - lokelma 10mg x1 now  - nutrition f/u for change in tube feeds  - monitor BMP    # H/o hypotension  - c/w midodrine, hold for SBP >110  - monitor BP    # Elevated Troponin , type 2 M sec to infection  # Sinus tachycardia sec to fever  - Troponin T, High Sensitivity Result: 35:(01.21.24 @ 00:28)  - Repeat EKG today: 12 Lead ECG (01.31.24 @ 11:11) >Normal sinus rhythm  - c/w  metoprolol    # Seizure Disorder  -c/w  Keppra   - seizure precautions    # H/o lower ext DVT  - c/w eliquis    # Hypothyroidism  - Thyroid Stimulating Hormone, Serum: 0.51 uIU/mL (01.21.24 @ 06:34)    # Normocytic Anemia - stable    # Down Syndrome  # Cerebral Palsy, non verbal    # Full code    #Pending: F/u blood cultures, nutrition f/u, monitor BMP, monitor BP  # Disposition: group home when stable    - Time spent: 52 minutes. Direct patient care. Discussed with housestaff

## 2024-01-31 NOTE — PROGRESS NOTE ADULT - SUBJECTIVE AND OBJECTIVE BOX
TEA ECHAVARRIA  58y, Female  Allergy: No Known Allergies      LOS  11d    CHIEF COMPLAINT: Respiratory Distress (30 Jan 2024 16:52)      INTERVAL EVENTS/HPI  - No acute events overnight, ID asked to f/u about + fungitell   - T(F): , Max: 98.9 (01-31-24 @ 06:15)  - Tolerating medication  - WBC Count: 8.28 (01-30-24 @ 07:17)  WBC Count: 6.12 (01-29-24 @ 06:39)     - Creatinine: 0.7 (01-30-24 @ 07:17)       ROS  ***    VITALS:  T(F): 98.9, Max: 98.9 (01-31-24 @ 06:15)  HR: 118  BP: 115/64  RR: 20Vital Signs Last 24 Hrs  T(C): 37.2 (31 Jan 2024 06:15), Max: 37.2 (31 Jan 2024 06:15)  T(F): 98.9 (31 Jan 2024 06:15), Max: 98.9 (31 Jan 2024 06:15)  HR: 118 (31 Jan 2024 06:15) (82 - 130)  BP: 115/64 (31 Jan 2024 06:15) (93/50 - 115/64)  BP(mean): 83 (31 Jan 2024 06:15) (83 - 83)  RR: 20 (31 Jan 2024 06:15) (18 - 20)  SpO2: 94% (31 Jan 2024 06:15) (94% - 100%)    Parameters below as of 31 Jan 2024 06:15  Patient On (Oxygen Delivery Method): nasal cannula  O2 Flow (L/min): 2      PHYSICAL EXAM:  ***    FH: Non-contributory  Social Hx: Non-contributory    TESTS & MEASUREMENTS:                        11.0   8.28  )-----------( 596      ( 30 Jan 2024 07:17 )             36.1     01-30    143  |  103  |  26<H>  ----------------------------<  175<H>  5.1<H>   |  25  |  0.7    Ca    8.7      30 Jan 2024 07:17  Mg     2.5     01-30    TPro  7.2  /  Alb  3.4<L>  /  TBili  0.2  /  DBili  x   /  AST  32  /  ALT  42<H>  /  AlkPhos  105  01-30      LIVER FUNCTIONS - ( 30 Jan 2024 07:17 )  Alb: 3.4 g/dL / Pro: 7.2 g/dL / ALK PHOS: 105 U/L / ALT: 42 U/L / AST: 32 U/L / GGT: x           Urinalysis Basic - ( 30 Jan 2024 07:17 )    Color: x / Appearance: x / SG: x / pH: x  Gluc: 175 mg/dL / Ketone: x  / Bili: x / Urobili: x   Blood: x / Protein: x / Nitrite: x   Leuk Esterase: x / RBC: x / WBC x   Sq Epi: x / Non Sq Epi: x / Bacteria: x        Culture - Urine (collected 01-23-24 @ 05:20)  Source: Clean Catch Clean Catch (Midstream)  Final Report (01-25-24 @ 00:26):    No growth    Culture - Blood (collected 01-22-24 @ 16:51)  Source: .Blood None  Final Report (01-28-24 @ 01:00):    No growth at 5 days    Culture - Urine (collected 01-21-24 @ 05:00)  Source: Clean Catch Clean Catch (Midstream)  Final Report (01-22-24 @ 07:15):    No growth    Culture - Blood (collected 01-20-24 @ 16:15)  Source: .Blood Blood-Peripheral  Gram Stain (01-21-24 @ 20:33):    Growth in aerobic bottle: Gram positive cocci in pairs  Final Report (01-22-24 @ 19:04):    Growth in aerobic bottle: Staphylococcus hominis    Isolation of Coagulase negative Staphylococcus from single blood culture    sets may represent    contamination. Contact the Microbiology Department at [phone] if    susceptibility testing is    clinically indicated.    Direct identification is available within approximately 3-5    hours either by Blood Panel Multiplexed PCR or Direct    MALDI-TOF. Details: [email or web address]  Organism: Blood Culture PCR (01-22-24 @ 19:04)  Organism: Blood Culture PCR (01-22-24 @ 19:04)      Method Type: PCR      -  Coagulase negative Staphylococcus: Detec    Culture - Blood (collected 01-20-24 @ 16:15)  Source: .Blood Blood-Peripheral  Final Report (01-25-24 @ 23:00):    No growth at 5 days            INFECTIOUS DISEASES TESTING  MRSA PCR Result.: Negative (01-22-24 @ 05:30)  Streptococcus pneumoniae Ag, Ur Result: Negative (01-21-24 @ 05:00)  Legionella Antigen, Urine: Negative (01-21-24 @ 05:00)  Rapid RVP Result: Detected (01-21-24 @ 00:58)  Procalcitonin, Serum: 0.51 (01-20-24 @ 21:23)  Fungitell: 325 (01-20-24 @ 21:23)  Vancomycin Level, Trough: 5.9 (11-12-23 @ 17:55)  Fungitell: 138 (11-07-23 @ 08:08)  Fungitell: 115 (11-02-23 @ 11:40)  Procalcitonin, Serum: 0.04 (11-02-23 @ 11:40)  Procalcitonin, Serum: 0.26 (10-24-23 @ 11:00)  MRSA PCR Result.: Negative (10-17-23 @ 17:20)  Fungitell: >500 (10-17-23 @ 17:20)  Procalcitonin, Serum: 4.36 (10-17-23 @ 17:20)  Procalcitonin, Serum: 4.18 (10-17-23 @ 12:35)  Procalcitonin, Serum: 0.07 (10-15-23 @ 07:28)  COVID-19 PCR: NotDetec (10-12-23 @ 09:14)  Procalcitonin, Serum: 0.40 (10-07-23 @ 10:54)  Streptococcus pneumoniae Ag, Ur Result: Negative (09-30-23 @ 14:36)  Legionella Antigen, Urine: Negative (09-30-23 @ 14:36)  MRSA PCR Result.: Negative (09-29-23 @ 16:50)  Procalcitonin, Serum: 9.78 (08-12-23 @ 11:25)  MRSA PCR Result.: Negative (08-12-23 @ 06:10)  Rapid RVP Result: NotDetec (08-12-23 @ 01:33)  Procalcitonin, Serum: 0.63 (08-10-23 @ 08:46)  Procalcitonin, Serum: 7.82 (08-04-23 @ 16:13)  MRSA PCR Result.: Negative (08-04-23 @ 14:52)  Rapid RVP Result: NotDetec (08-04-23 @ 03:00)  strept    INFLAMMATORY MARKERS  Sedimentation Rate, Erythrocyte: 67 mm/Hr (10-15-23 @ 07:28)  C-Reactive Protein, Serum: 16.1 mg/L (10-15-23 @ 07:28)      RADIOLOGY & ADDITIONAL TESTS:  I have personally reviewed the last available Chest xray  CXR      CT      CARDIOLOGY TESTING  12 Lead ECG:   Ventricular Rate 135 BPM    Atrial Rate 135 BPM    P-R Interval 138 ms    QRS Duration 64 ms    Q-T Interval 286 ms    QTC Calculation(Bazett) 429 ms    P Axis 58 degrees    R Axis 90 degrees    T Axis 75 degrees    Diagnosis Line Sinus tachycardia  Rightward axis  Borderline ECG    Confirmed by Behuria, Supreeti ([id number]) on 1/21/2024 3:35:29 PM (01-20-24 @ 20:52)  12 Lead ECG:   Ventricular Rate 138 BPM    Atrial Rate 125 BPM    P-R Interval 122 ms    QRS Duration 68 ms    Q-T Interval 312 ms    QTC Calculation(Bazett) 472 ms    P Axis 70 degrees    R Axis 82 degrees    T Axis 67 degrees    Diagnosis Line Sinus tachycardia  Otherwise normal ECG    Confirmed by Behuria, Supreeti ([id number]) on 1/21/2024 3:54:34 PM (01-20-24 @ 17:33)      MEDICATIONS  albuterol    90 MICROgram(s) HFA Inhaler 2 Inhalation two times a day  albuterol/ipratropium for Nebulization 3 Nebulizer every 6 hours  apixaban 5 Oral two times a day  artificial  tears Solution 1 Both EYES two times a day  calcium carbonate   1250 mG (OsCal) 1 Oral two times a day  chlorhexidine 2% Cloths 1 Topical daily  gabapentin 300 Oral every 12 hours  levETIRAcetam  Solution 500 Oral every 12 hours  melatonin 3 Oral at bedtime  metoprolol tartrate 25 Oral every 12 hours  midodrine. 10 Oral every 8 hours  pantoprazole  Injectable 40 IV Push every 24 hours  polyethylene glycol 3350 17 Oral at bedtime  raloxifene 60 Oral daily  senna 2 Oral at bedtime  silver sulfADIAZINE 1% Cream 1 Topical two times a day      WEIGHT  Weight (kg): 52.3 (01-21-24 @ 08:00)      ANTIBIOTICS:      All available historical records have been reviewed       JERICHO TEA  58y, Female  Allergy: No Known Allergies      LOS  11d    CHIEF COMPLAINT: Respiratory Distress (30 Jan 2024 16:52)      INTERVAL EVENTS/HPI  - No acute events overnight, ID asked to f/u about + fungitell   - Per resident, pt also had a fever, not documented  - T(F): , Max: 98.9 (01-31-24 @ 06:15)  - Tolerating medication  - WBC Count: 8.28 (01-30-24 @ 07:17)  WBC Count: 6.12 (01-29-24 @ 06:39)     - Creatinine: 0.7 (01-30-24 @ 07:17)       ROS  unable to obtain history secondary to patient's mental status and/or sedation     VITALS:  T(F): 98.9, Max: 98.9 (01-31-24 @ 06:15)  HR: 118  BP: 115/64  RR: 20Vital Signs Last 24 Hrs  T(C): 37.2 (31 Jan 2024 06:15), Max: 37.2 (31 Jan 2024 06:15)  T(F): 98.9 (31 Jan 2024 06:15), Max: 98.9 (31 Jan 2024 06:15)  HR: 118 (31 Jan 2024 06:15) (82 - 130)  BP: 115/64 (31 Jan 2024 06:15) (93/50 - 115/64)  BP(mean): 83 (31 Jan 2024 06:15) (83 - 83)  RR: 20 (31 Jan 2024 06:15) (18 - 20)  SpO2: 94% (31 Jan 2024 06:15) (94% - 100%)    Parameters below as of 31 Jan 2024 06:15  Patient On (Oxygen Delivery Method): nasal cannula  O2 Flow (L/min): 2      PHYSICAL EXAM:  Gen: chronically ill appearing  NGT contracted   HEENT: Normocephalic, atraumatic  Neck: supple, no lymphadenopathy  CV: Regular rate & regular rhythm  Lungs: decreased BS at bases, no fremitus  Abdomen: Soft, BS present  Ext: Warm, well perfused  Neuro: non focal, not following commands  Skin: no rash, no erythema  Lines: no phlebitis     FH: Non-contributory  Social Hx: Non-contributory    TESTS & MEASUREMENTS:                        11.0   8.28  )-----------( 596      ( 30 Jan 2024 07:17 )             36.1     01-30    143  |  103  |  26<H>  ----------------------------<  175<H>  5.1<H>   |  25  |  0.7    Ca    8.7      30 Jan 2024 07:17  Mg     2.5     01-30    TPro  7.2  /  Alb  3.4<L>  /  TBili  0.2  /  DBili  x   /  AST  32  /  ALT  42<H>  /  AlkPhos  105  01-30      LIVER FUNCTIONS - ( 30 Jan 2024 07:17 )  Alb: 3.4 g/dL / Pro: 7.2 g/dL / ALK PHOS: 105 U/L / ALT: 42 U/L / AST: 32 U/L / GGT: x           Urinalysis Basic - ( 30 Jan 2024 07:17 )    Color: x / Appearance: x / SG: x / pH: x  Gluc: 175 mg/dL / Ketone: x  / Bili: x / Urobili: x   Blood: x / Protein: x / Nitrite: x   Leuk Esterase: x / RBC: x / WBC x   Sq Epi: x / Non Sq Epi: x / Bacteria: x        Culture - Urine (collected 01-23-24 @ 05:20)  Source: Clean Catch Clean Catch (Midstream)  Final Report (01-25-24 @ 00:26):    No growth    Culture - Blood (collected 01-22-24 @ 16:51)  Source: .Blood None  Final Report (01-28-24 @ 01:00):    No growth at 5 days    Culture - Urine (collected 01-21-24 @ 05:00)  Source: Clean Catch Clean Catch (Midstream)  Final Report (01-22-24 @ 07:15):    No growth    Culture - Blood (collected 01-20-24 @ 16:15)  Source: .Blood Blood-Peripheral  Gram Stain (01-21-24 @ 20:33):    Growth in aerobic bottle: Gram positive cocci in pairs  Final Report (01-22-24 @ 19:04):    Growth in aerobic bottle: Staphylococcus hominis    Isolation of Coagulase negative Staphylococcus from single blood culture    sets may represent    contamination. Contact the Microbiology Department at [phone] if    susceptibility testing is    clinically indicated.    Direct identification is available within approximately 3-5    hours either by Blood Panel Multiplexed PCR or Direct    MALDI-TOF. Details: [email or web address]  Organism: Blood Culture PCR (01-22-24 @ 19:04)  Organism: Blood Culture PCR (01-22-24 @ 19:04)      Method Type: PCR      -  Coagulase negative Staphylococcus: Detec    Culture - Blood (collected 01-20-24 @ 16:15)  Source: .Blood Blood-Peripheral  Final Report (01-25-24 @ 23:00):    No growth at 5 days            INFECTIOUS DISEASES TESTING  MRSA PCR Result.: Negative (01-22-24 @ 05:30)  Streptococcus pneumoniae Ag, Ur Result: Negative (01-21-24 @ 05:00)  Legionella Antigen, Urine: Negative (01-21-24 @ 05:00)  Rapid RVP Result: Detected (01-21-24 @ 00:58)  Procalcitonin, Serum: 0.51 (01-20-24 @ 21:23)  Fungitell: 325 (01-20-24 @ 21:23)  Vancomycin Level, Trough: 5.9 (11-12-23 @ 17:55)  Fungitell: 138 (11-07-23 @ 08:08)  Fungitell: 115 (11-02-23 @ 11:40)  Procalcitonin, Serum: 0.04 (11-02-23 @ 11:40)  Procalcitonin, Serum: 0.26 (10-24-23 @ 11:00)  MRSA PCR Result.: Negative (10-17-23 @ 17:20)  Fungitell: >500 (10-17-23 @ 17:20)  Procalcitonin, Serum: 4.36 (10-17-23 @ 17:20)  Procalcitonin, Serum: 4.18 (10-17-23 @ 12:35)  Procalcitonin, Serum: 0.07 (10-15-23 @ 07:28)  COVID-19 PCR: NotDetec (10-12-23 @ 09:14)  Procalcitonin, Serum: 0.40 (10-07-23 @ 10:54)  Streptococcus pneumoniae Ag, Ur Result: Negative (09-30-23 @ 14:36)  Legionella Antigen, Urine: Negative (09-30-23 @ 14:36)  MRSA PCR Result.: Negative (09-29-23 @ 16:50)  Procalcitonin, Serum: 9.78 (08-12-23 @ 11:25)  MRSA PCR Result.: Negative (08-12-23 @ 06:10)  Rapid RVP Result: NotDetec (08-12-23 @ 01:33)  Procalcitonin, Serum: 0.63 (08-10-23 @ 08:46)  Procalcitonin, Serum: 7.82 (08-04-23 @ 16:13)  MRSA PCR Result.: Negative (08-04-23 @ 14:52)  Rapid RVP Result: NotDetec (08-04-23 @ 03:00)  strept    INFLAMMATORY MARKERS  Sedimentation Rate, Erythrocyte: 67 mm/Hr (10-15-23 @ 07:28)  C-Reactive Protein, Serum: 16.1 mg/L (10-15-23 @ 07:28)      RADIOLOGY & ADDITIONAL TESTS:  I have personally reviewed the last available Chest xray  CXR      CT      CARDIOLOGY TESTING  12 Lead ECG:   Ventricular Rate 135 BPM    Atrial Rate 135 BPM    P-R Interval 138 ms    QRS Duration 64 ms    Q-T Interval 286 ms    QTC Calculation(Bazett) 429 ms    P Axis 58 degrees    R Axis 90 degrees    T Axis 75 degrees    Diagnosis Line Sinus tachycardia  Rightward axis  Borderline ECG    Confirmed by Behuria, Supreeti ([id number]) on 1/21/2024 3:35:29 PM (01-20-24 @ 20:52)  12 Lead ECG:   Ventricular Rate 138 BPM    Atrial Rate 125 BPM    P-R Interval 122 ms    QRS Duration 68 ms    Q-T Interval 312 ms    QTC Calculation(Bazett) 472 ms    P Axis 70 degrees    R Axis 82 degrees    T Axis 67 degrees    Diagnosis Line Sinus tachycardia  Otherwise normal ECG    Confirmed by Behuria, Supreeti ([id number]) on 1/21/2024 3:54:34 PM (01-20-24 @ 17:33)      MEDICATIONS  albuterol    90 MICROgram(s) HFA Inhaler 2 Inhalation two times a day  albuterol/ipratropium for Nebulization 3 Nebulizer every 6 hours  apixaban 5 Oral two times a day  artificial  tears Solution 1 Both EYES two times a day  calcium carbonate   1250 mG (OsCal) 1 Oral two times a day  chlorhexidine 2% Cloths 1 Topical daily  gabapentin 300 Oral every 12 hours  levETIRAcetam  Solution 500 Oral every 12 hours  melatonin 3 Oral at bedtime  metoprolol tartrate 25 Oral every 12 hours  midodrine. 10 Oral every 8 hours  pantoprazole  Injectable 40 IV Push every 24 hours  polyethylene glycol 3350 17 Oral at bedtime  raloxifene 60 Oral daily  senna 2 Oral at bedtime  silver sulfADIAZINE 1% Cream 1 Topical two times a day      WEIGHT  Weight (kg): 52.3 (01-21-24 @ 08:00)      ANTIBIOTICS:      All available historical records have been reviewed

## 2024-01-31 NOTE — SWALLOW BEDSIDE ASSESSMENT ADULT - SWALLOW EVAL: DIAGNOSIS
+overt s/s aspiration/penetration w/ sips of water; +productive cough x1 w/ thick yellowish sputum which was suctioned via [place], pt remains congested

## 2024-01-31 NOTE — PROGRESS NOTE ADULT - SUBJECTIVE AND OBJECTIVE BOX
Interval events:    Patient is a 58y old Female who presents with a chief complaint of Respiratory Distress (31 Jan 2024 09:26)    Primary diagnosis of Sepsis with acute hypoxic respiratory failure    Today is hospital day 11d  This morning patient was seen and examined at bedside  History was limited because at their current baseline, the patient is: nonverbal  Per aide at bedside no complaints    Code Status:  see end of document    Family communication:  Contact date:  Name of person contacted:  Relationship to patient:  Communication details:  What matters most:    PAST MEDICAL & SURGICAL HISTORY  Down syndrome    Osteoporosis    Mild anemia    Neuropathy    S/P debridement  of R hip on 3/2/21      SOCIAL HISTORY:  Social History:      ALLERGIES:  No Known Allergies    MEDICATIONS:  STANDING MEDICATIONS  albuterol    90 MICROgram(s) HFA Inhaler 2 Puff(s) Inhalation two times a day  albuterol/ipratropium for Nebulization 3 milliLiter(s) Nebulizer every 6 hours  apixaban 5 milliGRAM(s) Oral two times a day  artificial  tears Solution 1 Drop(s) Both EYES two times a day  calcium carbonate   1250 mG (OsCal) 1 Tablet(s) Oral two times a day  chlorhexidine 2% Cloths 1 Application(s) Topical daily  gabapentin 300 milliGRAM(s) Oral every 12 hours  levETIRAcetam  Solution 500 milliGRAM(s) Oral every 12 hours  melatonin 3 milliGRAM(s) Oral at bedtime  metoprolol tartrate 25 milliGRAM(s) Oral every 12 hours  midodrine. 10 milliGRAM(s) Oral every 8 hours  pantoprazole  Injectable 40 milliGRAM(s) IV Push every 24 hours  polyethylene glycol 3350 17 Gram(s) Oral at bedtime  raloxifene 60 milliGRAM(s) Oral daily  senna 2 Tablet(s) Oral at bedtime  silver sulfADIAZINE 1% Cream 1 Application(s) Topical two times a day    PRN MEDICATIONS  acetaminophen     Tablet .. 650 milliGRAM(s) Oral every 6 hours PRN  aluminum hydroxide/magnesium hydroxide/simethicone Suspension 30 milliLiter(s) Oral every 4 hours PRN  ondansetron Injectable 4 milliGRAM(s) IV Push every 8 hours PRN  sodium chloride 0.65% Nasal 1 Spray(s) Both Nostrils daily PRN    VITALS:   T(F): 99.9  HR: 124  BP: 104/52  RR: 20  SpO2: 94%    PHYSICAL EXAM:  GENERAL:   ( x ) NAD, lying in bed comfortably     (  ) obtunded     (  ) lethargic     (  ) somnolent    HEAD: NGT  ( x ) Atraumatic     (  ) hematoma     (  ) laceration (specify location:       )     NECK:  ( x ) Supple     (  ) neck stiffness     (  ) nuchal rigidity     (  )  no JVD     (  ) JVD present ( -- cm)    CHEST:  (  ) Chest wall tenderness (specify location:       )    HEART:  Rate -->     ( x ) normal rate     (  ) bradycardic     (  ) tachycardic  Rhythm -->     ( x ) regular     (  ) regularly irregular     (  ) irregularly irregular  Murmurs -->     ( x ) normal s1s2     (  ) systolic murmur     (  ) diastolic murmur     (  ) continuous murmur      (  ) S3 present     (  ) S4 present    LUNG:   ( x ) Unlabored respirations     (  ) tachypnea  ( x ) B/L air entry     (  ) decreased breath sounds in:  (location     )    ( x ) no adventitious sound     (  ) crackles     (  ) wheezing      (  ) rhonchi      (specify location:       )    ABDOMEN:   ( x ) Soft     (  ) tense   |   ( x ) nondistended     (  ) distended   |   ( x ) +BS     (  ) hypoactive bowel sounds     (  ) hyperactive bowel sounds  ( x ) nontender     (  ) RUQ tenderness     (  ) RLQ tenderness     (  ) LLQ tenderness     (  ) epigastric tenderness     (  ) diffuse tenderness  (  ) Splenomegaly      (  ) Hepatomegaly      (  ) Jaundice     (  ) ecchymosis     EXTREMITIES: legs contracted  (  ) Cyanosis    (  ) varicose veins    (  ) clubbing    (  ) gangrene:     (location:     )  (  ) pitting edema     (  ) non-pitting edema         SKIN:   (  ) rash:     (  ) pustules     (  ) vesicles     (  ) ulcer     (  ) ecchymosis     (specify location:     )    NERVOUS SYSTEM:  nonverbal  ( x ) A&Ox     (  ) confused     (  ) lethargic  CN II-XII:     (  ) grossly intact     (  ) deficits found     (Specify:     )   Upper extremities:     (  ) no sensorimotor deficits     (  ) weakness     (  ) loss of proprioception/vibration     (  ) loss of touch/temperature (specify:    )  Lower extremities:     (  ) no sensorimotor deficits     (  ) weakness     (  ) loss of proprioception/vibration     (  ) loss of touch/temperature (specify:    )    LABS:                        10.3   9.41  )-----------( 632      ( 31 Jan 2024 08:01 )             33.8     01-31    142  |  104  |  26<H>  ----------------------------<  247<H>  5.5<H>   |  27  |  0.7    Ca    8.8      31 Jan 2024 08:01  Mg     2.6     01-31    TPro  6.9  /  Alb  3.3<L>  /  TBili  <0.2  /  DBili  x   /  AST  23  /  ALT  34  /  AlkPhos  93  01-31      Urinalysis Basic - ( 31 Jan 2024 08:01 )    Color: x / Appearance: x / SG: x / pH: x  Gluc: 247 mg/dL / Ketone: x  / Bili: x / Urobili: x   Blood: x / Protein: x / Nitrite: x   Leuk Esterase: x / RBC: x / WBC x   Sq Epi: x / Non Sq Epi: x / Bacteria: x                  ECHO, ENDO, EEG, RAD:

## 2024-02-01 LAB
ALBUMIN SERPL ELPH-MCNC: 3.3 G/DL — LOW (ref 3.5–5.2)
ALP SERPL-CCNC: 99 U/L — SIGNIFICANT CHANGE UP (ref 30–115)
ALT FLD-CCNC: 31 U/L — SIGNIFICANT CHANGE UP (ref 0–41)
ANION GAP SERPL CALC-SCNC: 14 MMOL/L — SIGNIFICANT CHANGE UP (ref 7–14)
APPEARANCE UR: ABNORMAL
AST SERPL-CCNC: 21 U/L — SIGNIFICANT CHANGE UP (ref 0–41)
BASOPHILS # BLD AUTO: 0.09 K/UL — SIGNIFICANT CHANGE UP (ref 0–0.2)
BASOPHILS NFR BLD AUTO: 0.6 % — SIGNIFICANT CHANGE UP (ref 0–1)
BILIRUB SERPL-MCNC: 0.4 MG/DL — SIGNIFICANT CHANGE UP (ref 0.2–1.2)
BILIRUB UR-MCNC: NEGATIVE — SIGNIFICANT CHANGE UP
BUN SERPL-MCNC: 20 MG/DL — SIGNIFICANT CHANGE UP (ref 10–20)
CALCIUM SERPL-MCNC: 8.8 MG/DL — SIGNIFICANT CHANGE UP (ref 8.4–10.5)
CHLORIDE SERPL-SCNC: 103 MMOL/L — SIGNIFICANT CHANGE UP (ref 98–110)
CO2 SERPL-SCNC: 23 MMOL/L — SIGNIFICANT CHANGE UP (ref 17–32)
COLOR SPEC: YELLOW — SIGNIFICANT CHANGE UP
CREAT SERPL-MCNC: 0.8 MG/DL — SIGNIFICANT CHANGE UP (ref 0.7–1.5)
CRP SERPL-MCNC: 132.3 MG/L — HIGH
DIFF PNL FLD: NEGATIVE — SIGNIFICANT CHANGE UP
EGFR: 86 ML/MIN/1.73M2 — SIGNIFICANT CHANGE UP
EOSINOPHIL # BLD AUTO: 0 K/UL — SIGNIFICANT CHANGE UP (ref 0–0.7)
EOSINOPHIL NFR BLD AUTO: 0 % — SIGNIFICANT CHANGE UP (ref 0–8)
GAS PNL BLDA: SIGNIFICANT CHANGE UP
GLUCOSE SERPL-MCNC: 190 MG/DL — HIGH (ref 70–99)
GLUCOSE UR QL: NEGATIVE MG/DL — SIGNIFICANT CHANGE UP
HCT VFR BLD CALC: 33.7 % — LOW (ref 37–47)
HGB BLD-MCNC: 10.5 G/DL — LOW (ref 12–16)
IMM GRANULOCYTES NFR BLD AUTO: 0.4 % — HIGH (ref 0.1–0.3)
KETONES UR-MCNC: NEGATIVE MG/DL — SIGNIFICANT CHANGE UP
LACTATE SERPL-SCNC: 1.3 MMOL/L — SIGNIFICANT CHANGE UP (ref 0.7–2)
LEUKOCYTE ESTERASE UR-ACNC: NEGATIVE — SIGNIFICANT CHANGE UP
LYMPHOCYTES # BLD AUTO: 1.23 K/UL — SIGNIFICANT CHANGE UP (ref 1.2–3.4)
LYMPHOCYTES # BLD AUTO: 7.8 % — LOW (ref 20.5–51.1)
MAGNESIUM SERPL-MCNC: 2.3 MG/DL — SIGNIFICANT CHANGE UP (ref 1.8–2.4)
MCHC RBC-ENTMCNC: 24.8 PG — LOW (ref 27–31)
MCHC RBC-ENTMCNC: 31.2 G/DL — LOW (ref 32–37)
MCV RBC AUTO: 79.7 FL — LOW (ref 81–99)
MONOCYTES # BLD AUTO: 0.9 K/UL — HIGH (ref 0.1–0.6)
MONOCYTES NFR BLD AUTO: 5.7 % — SIGNIFICANT CHANGE UP (ref 1.7–9.3)
NEUTROPHILS # BLD AUTO: 13.46 K/UL — HIGH (ref 1.4–6.5)
NEUTROPHILS NFR BLD AUTO: 85.5 % — HIGH (ref 42.2–75.2)
NITRITE UR-MCNC: NEGATIVE — SIGNIFICANT CHANGE UP
NRBC # BLD: 0 /100 WBCS — SIGNIFICANT CHANGE UP (ref 0–0)
NT-PROBNP SERPL-SCNC: 631 PG/ML — HIGH (ref 0–300)
PH UR: 6.5 — SIGNIFICANT CHANGE UP (ref 5–8)
PLATELET # BLD AUTO: 629 K/UL — HIGH (ref 130–400)
PMV BLD: 10.2 FL — SIGNIFICANT CHANGE UP (ref 7.4–10.4)
POTASSIUM SERPL-MCNC: 4.6 MMOL/L — SIGNIFICANT CHANGE UP (ref 3.5–5)
POTASSIUM SERPL-SCNC: 4.6 MMOL/L — SIGNIFICANT CHANGE UP (ref 3.5–5)
PROT SERPL-MCNC: 7.4 G/DL — SIGNIFICANT CHANGE UP (ref 6–8)
PROT UR-MCNC: 100 MG/DL
RBC # BLD: 4.23 M/UL — SIGNIFICANT CHANGE UP (ref 4.2–5.4)
RBC # FLD: 20 % — HIGH (ref 11.5–14.5)
SODIUM SERPL-SCNC: 140 MMOL/L — SIGNIFICANT CHANGE UP (ref 135–146)
SP GR SPEC: >1.03 — HIGH (ref 1–1.03)
UROBILINOGEN FLD QL: 1 MG/DL — SIGNIFICANT CHANGE UP (ref 0.2–1)
WBC # BLD: 15.75 K/UL — HIGH (ref 4.8–10.8)
WBC # FLD AUTO: 15.75 K/UL — HIGH (ref 4.8–10.8)

## 2024-02-01 PROCEDURE — 93010 ELECTROCARDIOGRAM REPORT: CPT

## 2024-02-01 PROCEDURE — 71045 X-RAY EXAM CHEST 1 VIEW: CPT | Mod: 26

## 2024-02-01 PROCEDURE — 99233 SBSQ HOSP IP/OBS HIGH 50: CPT

## 2024-02-01 PROCEDURE — 93970 EXTREMITY STUDY: CPT | Mod: 26

## 2024-02-01 RX ORDER — ACETAMINOPHEN 500 MG
1000 TABLET ORAL ONCE
Refills: 0 | Status: COMPLETED | OUTPATIENT
Start: 2024-02-01 | End: 2024-02-01

## 2024-02-01 RX ORDER — ACETAMINOPHEN 500 MG
650 TABLET ORAL ONCE
Refills: 0 | Status: COMPLETED | OUTPATIENT
Start: 2024-02-01 | End: 2024-02-01

## 2024-02-01 RX ORDER — SODIUM CHLORIDE 9 MG/ML
500 INJECTION, SOLUTION INTRAVENOUS ONCE
Refills: 0 | Status: COMPLETED | OUTPATIENT
Start: 2024-02-01 | End: 2024-02-01

## 2024-02-01 RX ORDER — MEROPENEM 1 G/30ML
1000 INJECTION INTRAVENOUS EVERY 8 HOURS
Refills: 0 | Status: COMPLETED | OUTPATIENT
Start: 2024-02-01 | End: 2024-02-08

## 2024-02-01 RX ADMIN — PANTOPRAZOLE SODIUM 40 MILLIGRAM(S): 20 TABLET, DELAYED RELEASE ORAL at 05:06

## 2024-02-01 RX ADMIN — Medication 3 MILLILITER(S): at 13:10

## 2024-02-01 RX ADMIN — Medication 3 MILLILITER(S): at 19:51

## 2024-02-01 RX ADMIN — Medication 1 DROP(S): at 05:08

## 2024-02-01 RX ADMIN — MEROPENEM 100 MILLIGRAM(S): 1 INJECTION INTRAVENOUS at 22:13

## 2024-02-01 RX ADMIN — Medication 1 APPLICATION(S): at 05:08

## 2024-02-01 RX ADMIN — MEROPENEM 100 MILLIGRAM(S): 1 INJECTION INTRAVENOUS at 10:59

## 2024-02-01 RX ADMIN — MEROPENEM 100 MILLIGRAM(S): 1 INJECTION INTRAVENOUS at 16:01

## 2024-02-01 RX ADMIN — Medication 3 MILLILITER(S): at 07:55

## 2024-02-01 RX ADMIN — Medication 400 MILLIGRAM(S): at 07:02

## 2024-02-01 RX ADMIN — CHLORHEXIDINE GLUCONATE 1 APPLICATION(S): 213 SOLUTION TOPICAL at 16:01

## 2024-02-01 RX ADMIN — Medication 650 MILLIGRAM(S): at 05:10

## 2024-02-01 RX ADMIN — Medication 125 MILLIGRAM(S): at 15:59

## 2024-02-01 RX ADMIN — Medication 650 MILLIGRAM(S): at 22:03

## 2024-02-01 RX ADMIN — SODIUM CHLORIDE 500 MILLILITER(S): 9 INJECTION, SOLUTION INTRAVENOUS at 14:36

## 2024-02-01 NOTE — SWALLOW BEDSIDE ASSESSMENT ADULT - COMMENTS
Pt s/p FEES 1/30, recs for NPO w/ NGT. CXR 2/1-> Bibasal opacities without significant change.  Pt again febrile this AM. NGT became coiled and was subsequently removed. Deep tracheal suctioning performed as per Medical Resident.

## 2024-02-01 NOTE — PROGRESS NOTE ADULT - ASSESSMENT
IMPRESSION:    Acute hypoxemic respiratory failure   Likely aspiration pneumonia   Sepsis POA  Septic shock resolved  Recurrent aspiration pneumonia/ prior intubation  HO GI bleed  HO OM  HO recent duodenal perforation   HO polymicrobial bacteremia   H/o CP  H/o seizures    PLAN:    CNS:  Avoid CNS Depressant, AED. MS at baseline.     HEENT: Oral care.  ET care     PULMONARY: HOB at 45 degrees.  Aspiration precaution.  ABG noted. Respiratory alkalosis. Switch to HFNC. Keep spo2 more than 92%.     CARDIOVASCULAR: Keep MAP more than 65mmhg. Avoid overload. On Midodrine TID.     GI: Protonix. NPO for now. Speech and swallow. May need NGT.     RENAL:   FU lytes.  Correct as needed.  No need for barlow.     INFECTIOUS DISEASE:  Check blood, sputum culture. Procal. Nasal MRSA. Meropenem for now.     HEMATOLOGICAL: On Eliquis.     ENDOCRINE:  Follow up FS.  Insulin protocol if needed.    MUSCULOSKELETAL: Bedrest.  Off loading.  Wound care.      Prognosis very poor.     SDU for now.

## 2024-02-01 NOTE — PROGRESS NOTE ADULT - ASSESSMENT
58F w/ PMHx Down Syndrome, nonverbal at baseline, Hypothyroidism, Cerebral palsy and Seizure Disorders presents to the ED from nursing facility/group home ([facility]) presented to ED for respiratory distress and high grade fever. Patient found to be septic on admission.Admitted to SDU for management of acute respiratory distress 2/2 CAP/Aspiration PNA (20 Jan 2024 18:22)    # Sepsis POA   # Lactic acidosis- resolved  # Acute hypoxic resp failure sec to aspiration pneumonia  # RSV bronchiolitis   # H/o Dysphagia  -  Xray Chest 1 View-PORTABLE IMMEDIATE (Xray Chest 1 View-PORTABLE IMMEDIATE .) (02.01.24 @ 03:02) >Bibasal opacities without significant change. No pneumothorax.  -  RVP- RSV detected  - MRSA negative   - BCx (1/20): Staph hominis -  contaminant repeat BCx have been NGTD  - Failed FEES --> c/w  NG tube feeds  - Aspiration precautions  - c/w duoneb  - repeat blood cultures   - maintain oxygen sat > 94  - F/u blood cultures  - pt was started on Meropenem  - ABG  - critical care eval for upgrade    # Hyperkalemia- resolved  - S/p lokelma 10mg  - nutrition f/u for change in tube feeds: Recommend Nepro with carbsteady via NG tube - 270 mL q8hr provides 1458 kcal, 65 g protein, 588 mL free water -- flush with 100 mL pre/post feeds50 mL free water q4hr .  Prosource TF once daily. HOB elevated > 45 degrees  - monitor BMP    # H/o hypotension  - c/w midodrine, hold for SBP >110  - monitor BP    # Elevated Troponin , type 2 M sec to infection  # Sinus tachycardia sec to fever  - Troponin T, High Sensitivity Result: 35:(01.21.24 @ 00:28)  - Repeat EKG today: 12 Lead ECG (01.31.24 @ 11:11) >Normal sinus rhythm  - c/w  metoprolol    # Seizure Disorder  -c/w  Keppra   - seizure precautions    # H/o lower ext DVT  - c/w eliquis    # Hypothyroidism  - Thyroid Stimulating Hormone, Serum: 0.51 uIU/mL (01.21.24 @ 06:34)    # Normocytic Anemia - stable    # Down Syndrome  # Cerebral Palsy, non verbal    # Full code    #Pending: F/u blood cultures, UA , urine cultures, monitor BMP, monitor BP, ABG, critical care ecal  # Disposition: group home when stable   58F w/ PMHx Down Syndrome, nonverbal at baseline, Hypothyroidism, Cerebral palsy and Seizure Disorders presents to the ED from nursing facility/group home ([facility]) presented to ED for respiratory distress and high grade fever. Patient found to be septic on admission.Admitted to SDU for management of acute respiratory distress 2/2 CAP/Aspiration PNA (20 Jan 2024 18:22)    # Sepsis POA   # Lactic acidosis- resolved  # Acute hypoxic resp failure sec to aspiration pneumonia  # RSV bronchiolitis   # H/o Dysphagia  -  Xray Chest 1 View-PORTABLE IMMEDIATE (Xray Chest 1 View-PORTABLE IMMEDIATE .) (02.01.24 @ 03:02) >Bibasal opacities without significant change. No pneumothorax.  -  RVP- RSV detected  - MRSA negative   - BCx (1/20): Staph hominis -  contaminant repeat BCx have been NGTD  - Failed FEES --> c/w  NG tube feeds  - Aspiration precautions  - c/w duoneb  - repeat blood cultures   - maintain oxygen sat > 94  - F/u blood cultures  - pt was started on Meropenem  - Solumedrol 125mg IV x1 now  - ABG  - critical care eval for upgrade    # Hyperkalemia- resolved  - S/p lokelma 10mg  - nutrition f/u for change in tube feeds: Recommend Nepro with carbsteady via NG tube - 270 mL q8hr provides 1458 kcal, 65 g protein, 588 mL free water -- flush with 100 mL pre/post feeds50 mL free water q4hr .  Prosource TF once daily. HOB elevated > 45 degrees  - monitor BMP    # H/o hypotension  - IV fluid bolusx1  - c/w midodrine  - monitor BP    # Elevated Troponin , type 2 M sec to infection  # Sinus tachycardia sec to fever  - Troponin T, High Sensitivity Result: 35:(01.21.24 @ 00:28)  - Repeat EKG today: 12 Lead ECG (01.31.24 @ 11:11) >Normal sinus rhythm  - c/w  metoprolol    # Seizure Disorder  -c/w  Keppra   - seizure precautions    # H/o lower ext DVT  - c/w eliquis    # Hypothyroidism  - Thyroid Stimulating Hormone, Serum: 0.51 uIU/mL (01.21.24 @ 06:34)    # Normocytic Anemia - stable    # Down Syndrome  # Cerebral Palsy, non verbal    # Full code    #Pending: F/u blood cultures, UA , urine cultures, monitor BMP, monitor BP, ABG, critical care eval  # Disposition: group home when stable

## 2024-02-01 NOTE — SWALLOW BEDSIDE ASSESSMENT ADULT - SLP GENERAL OBSERVATIONS
pt received in bed awake alert in no apparent pain. +room air +chest congestion appears improved from yesterday; +oral hygiene performed prior to PO trials. +GH aide at bedside

## 2024-02-01 NOTE — SWALLOW BEDSIDE ASSESSMENT ADULT - ASR SWALLOW RECOMMEND DIAG
SLP to assess candidacy for repeat instrumental swallow study pt will warrant repeat instrumental swallow study when medically optimized

## 2024-02-01 NOTE — PROGRESS NOTE ADULT - SUBJECTIVE AND OBJECTIVE BOX
Patient is a 58y old  Female who presents with a chief complaint of Respiratory Distress (01 Feb 2024 11:30)    Patient was seen and examined.  Febrile this AM  Desaturated after swallow eval , pt was  placed on NRB  Tachycardic  Pt non verbal    PAST MEDICAL & SURGICAL HISTORY:  Down syndrome  Osteoporosis  Mild anemia  Neuropathy  S/P debridement of R hip on 3/2/21    Allergies  No Known Allergies    MEDICATIONS  (STANDING):  albuterol    90 MICROgram(s) HFA Inhaler 2 Puff(s) Inhalation two times a day  albuterol/ipratropium for Nebulization 3 milliLiter(s) Nebulizer every 6 hours  apixaban 5 milliGRAM(s) Oral two times a day  artificial  tears Solution 1 Drop(s) Both EYES two times a day  calcium carbonate   1250 mG (OsCal) 1 Tablet(s) Oral two times a day  chlorhexidine 2% Cloths 1 Application(s) Topical daily  gabapentin 300 milliGRAM(s) Oral every 12 hours  levETIRAcetam  Solution 500 milliGRAM(s) Oral every 12 hours  melatonin 3 milliGRAM(s) Oral at bedtime  meropenem  IVPB 1000 milliGRAM(s) IV Intermittent every 8 hours  metoprolol tartrate 25 milliGRAM(s) Oral every 12 hours  midodrine. 10 milliGRAM(s) Oral every 8 hours  pantoprazole  Injectable 40 milliGRAM(s) IV Push every 24 hours  polyethylene glycol 3350 17 Gram(s) Oral at bedtime  raloxifene 60 milliGRAM(s) Oral daily  senna 2 Tablet(s) Oral at bedtime  silver sulfADIAZINE 1% Cream 1 Application(s) Topical two times a day    MEDICATIONS  (PRN):  acetaminophen     Tablet .. 650 milliGRAM(s) Oral every 6 hours PRN Temp greater or equal to 38C (100.4F), Mild Pain (1 - 3)  aluminum hydroxide/magnesium hydroxide/simethicone Suspension 30 milliLiter(s) Oral every 4 hours PRN Dyspepsia  ondansetron Injectable 4 milliGRAM(s) IV Push every 8 hours PRN Nausea and/or Vomiting  sodium chloride 0.65% Nasal 1 Spray(s) Both Nostrils daily PRN Nasal Congestion    Vital Signs Last 24 Hrs  T(C): 37.7  T(F): 99.9  HR: 141  BP: 152/73  BP(mean): --  RR: 18  SpO2: --    O/E:  Awake, non verbal  not in distress. on NRB  HEENT: atraumatic, EOMI.  Chest:  coarse breath sounds  CVS: SIS2 +, tachycardia, no murmur.  P/A: Soft, BS+  CNS: awake , non verbal  Ext: contracted  Skin: no rash, no ulcers.  All systems reviewed positive findings as above.    POCT Blood Glucose.: 113 mg/dL (30 Jan 2024 21:56)                          10.5<L>  15.75<H> )-----------( 629<H>    ( 01 Feb 2024 07:46 )             33.7<L>                        9.4<L>  9.65  )-----------( 594<H>    ( 31 Jan 2024 18:21 )             30.7<L>    02-01    140  |  103  |  20  ----------------------------<  190<H>  4.6   |  23  |  0.8  01-31    141  |  103  |  25<H>  ----------------------------<  123<H>  4.7   |  30  |  0.6<L>    Ca    8.8      01 Feb 2024 07:46  Ca    8.6      31 Jan 2024 18:21  Ca    8.8      31 Jan 2024 08:01  Mg     2.3     02-01    TPro  7.4  /  Alb  3.3<L>  /  TBili  0.4  /  DBili  x   /  AST  21  /  ALT  31  /  AlkPhos  99  02-01  TPro  6.7  /  Alb  3.2<L>  /  TBili  <0.2  /  DBili  x   /  AST  21  /  ALT  30  /  AlkPhos  90  01-31  TPro  6.9  /  Alb  3.3<L>  /  TBili  <0.2  /  DBili  x   /  AST  23  /  ALT  34  /  AlkPhos  93  01-31            Urinalysis Basic - ( 01 Feb 2024 11:40 )    Color: Yellow / Appearance: Turbid / SG: >1.030 / pH: x  Gluc: x / Ketone: Negative mg/dL  / Bili: Negative / Urobili: 1.0 mg/dL   Blood: x / Protein: 100 mg/dL / Nitrite: Negative   Leuk Esterase: Negative / RBC: 10 /HPF / WBC 5 /HPF   Sq Epi: x / Non Sq Epi: 2 /HPF / Bacteria: Negative /HPF

## 2024-02-01 NOTE — PROGRESS NOTE ADULT - SUBJECTIVE AND OBJECTIVE BOX
Patient is a 58y old  Female who presents with a chief complaint of Respiratory Distress (01 Feb 2024 13:47)        Over Night Events:    Febrile 102.1   Hypoxemic   On NRB         ROS:  See HPI    PHYSICAL EXAM    ICU Vital Signs Last 24 Hrs  T(C): 37.7 (01 Feb 2024 13:45), Max: 38.9 (01 Feb 2024 05:00)  T(F): 99.9 (01 Feb 2024 13:45), Max: 102.1 (01 Feb 2024 05:00)  HR: 141 (01 Feb 2024 13:45) (108 - 141)  BP: 152/73 (01 Feb 2024 13:45) (114/58 - 152/73)  BP(mean): --  ABP: --  ABP(mean): --  RR: 18 (01 Feb 2024 13:45) (18 - 19)  SpO2: --    O2 Parameters below as of 01 Feb 2024 05:00  Patient On (Oxygen Delivery Method): room air            General: NAD   HEENT: NUVIA             Lymphatic system: No cervical LN   Lungs: Bilateral BS, coarse, NGT removed   Cardiovascular: Regular   Gastrointestinal: Soft, Positive BS  Extremities: No clubbing. At baseline.   Skin: Warm, intact  Neurological: Unchanged         LABS:                            10.5   15.75 )-----------( 629      ( 01 Feb 2024 07:46 )             33.7                                               02-01    140  |  103  |  20  ----------------------------<  190<H>  4.6   |  23  |  0.8    Ca    8.8      01 Feb 2024 07:46  Mg     2.3     02-01    TPro  7.4  /  Alb  3.3<L>  /  TBili  0.4  /  DBili  x   /  AST  21  /  ALT  31  /  AlkPhos  99  02-01                                             Urinalysis Basic - ( 01 Feb 2024 11:40 )    Color: Yellow / Appearance: Turbid / SG: >1.030 / pH: x  Gluc: x / Ketone: Negative mg/dL  / Bili: Negative / Urobili: 1.0 mg/dL   Blood: x / Protein: 100 mg/dL / Nitrite: Negative   Leuk Esterase: Negative / RBC: 10 /HPF / WBC 5 /HPF   Sq Epi: x / Non Sq Epi: 2 /HPF / Bacteria: Negative /HPF                                                  LIVER FUNCTIONS - ( 01 Feb 2024 07:46 )  Alb: 3.3 g/dL / Pro: 7.4 g/dL / ALK PHOS: 99 U/L / ALT: 31 U/L / AST: 21 U/L / GGT: x                                                                                                                                   ABG - ( 01 Feb 2024 12:05 )  pH, Arterial: 7.56  pH, Blood: x     /  pCO2: 28    /  pO2: 156   / HCO3: 25    / Base Excess: 3.4   /  SaO2: 98.8                MEDICATIONS  (STANDING):  albuterol    90 MICROgram(s) HFA Inhaler 2 Puff(s) Inhalation two times a day  albuterol/ipratropium for Nebulization 3 milliLiter(s) Nebulizer every 6 hours  apixaban 5 milliGRAM(s) Oral two times a day  artificial  tears Solution 1 Drop(s) Both EYES two times a day  calcium carbonate   1250 mG (OsCal) 1 Tablet(s) Oral two times a day  chlorhexidine 2% Cloths 1 Application(s) Topical daily  gabapentin 300 milliGRAM(s) Oral every 12 hours  levETIRAcetam  Solution 500 milliGRAM(s) Oral every 12 hours  melatonin 3 milliGRAM(s) Oral at bedtime  meropenem  IVPB 1000 milliGRAM(s) IV Intermittent every 8 hours  metoprolol tartrate 25 milliGRAM(s) Oral every 12 hours  midodrine. 10 milliGRAM(s) Oral every 8 hours  pantoprazole  Injectable 40 milliGRAM(s) IV Push every 24 hours  polyethylene glycol 3350 17 Gram(s) Oral at bedtime  raloxifene 60 milliGRAM(s) Oral daily  senna 2 Tablet(s) Oral at bedtime  silver sulfADIAZINE 1% Cream 1 Application(s) Topical two times a day    MEDICATIONS  (PRN):  acetaminophen     Tablet .. 650 milliGRAM(s) Oral every 6 hours PRN Temp greater or equal to 38C (100.4F), Mild Pain (1 - 3)  aluminum hydroxide/magnesium hydroxide/simethicone Suspension 30 milliLiter(s) Oral every 4 hours PRN Dyspepsia  ondansetron Injectable 4 milliGRAM(s) IV Push every 8 hours PRN Nausea and/or Vomiting  sodium chloride 0.65% Nasal 1 Spray(s) Both Nostrils daily PRN Nasal Congestion      Xrays: worsening basilar infiltrate                                                                                     ECHO

## 2024-02-01 NOTE — SWALLOW BEDSIDE ASSESSMENT ADULT - PHARYNGEAL PHASE
Pt wants refills sent to [facility] [address] in [facility].
Wet vocal quality post oral intake/Cough post oral intake/Delayed cough post oral intake
Cough post oral intake/Throat clear post oral intake
Within functional limits
Delayed cough post oral intake/Delayed throat clear post oral intake

## 2024-02-01 NOTE — PROGRESS NOTE ADULT - SUBJECTIVE AND OBJECTIVE BOX
Interval events:    Patient is a 58y old Female who presents with a chief complaint of Respiratory Distress (01 Feb 2024 09:28)    Primary diagnosis of Sepsis with acute hypoxic respiratory failure    Today is hospital day 12d  This morning patient was seen and examined at bedside  History was limited because at their current baseline, the patient is: nonverbal  Per aide at bedside no complaints    Code Status:  see end of document    Family communication:  Contact date:  Name of person contacted:  Relationship to patient:  Communication details:  What matters most:    PAST MEDICAL & SURGICAL HISTORY  Down syndrome    Osteoporosis    Mild anemia    Neuropathy    S/P debridement  of R hip on 3/2/21      SOCIAL HISTORY:  Social History:      ALLERGIES:  No Known Allergies    MEDICATIONS:  STANDING MEDICATIONS  albuterol    90 MICROgram(s) HFA Inhaler 2 Puff(s) Inhalation two times a day  albuterol/ipratropium for Nebulization 3 milliLiter(s) Nebulizer every 6 hours  apixaban 5 milliGRAM(s) Oral two times a day  artificial  tears Solution 1 Drop(s) Both EYES two times a day  calcium carbonate   1250 mG (OsCal) 1 Tablet(s) Oral two times a day  chlorhexidine 2% Cloths 1 Application(s) Topical daily  gabapentin 300 milliGRAM(s) Oral every 12 hours  levETIRAcetam  Solution 500 milliGRAM(s) Oral every 12 hours  melatonin 3 milliGRAM(s) Oral at bedtime  meropenem  IVPB 1000 milliGRAM(s) IV Intermittent every 8 hours  metoprolol tartrate 25 milliGRAM(s) Oral every 12 hours  midodrine. 10 milliGRAM(s) Oral every 8 hours  pantoprazole  Injectable 40 milliGRAM(s) IV Push every 24 hours  polyethylene glycol 3350 17 Gram(s) Oral at bedtime  raloxifene 60 milliGRAM(s) Oral daily  senna 2 Tablet(s) Oral at bedtime  silver sulfADIAZINE 1% Cream 1 Application(s) Topical two times a day    PRN MEDICATIONS  acetaminophen     Tablet .. 650 milliGRAM(s) Oral every 6 hours PRN  aluminum hydroxide/magnesium hydroxide/simethicone Suspension 30 milliLiter(s) Oral every 4 hours PRN  ondansetron Injectable 4 milliGRAM(s) IV Push every 8 hours PRN  sodium chloride 0.65% Nasal 1 Spray(s) Both Nostrils daily PRN    VITALS:   T(F): 102  HR: 108  BP: 116/81  RR: 19  SpO2: --    PHYSICAL EXAM:  GENERAL:   ( x ) NAD, lying in bed comfortably     (  ) obtunded     (  ) lethargic     (  ) somnolent    HEAD: NGT  ( x ) Atraumatic     (  ) hematoma     (  ) laceration (specify location:       )     NECK:  ( x ) Supple     (  ) neck stiffness     (  ) nuchal rigidity     (  )  no JVD     (  ) JVD present ( -- cm)    CHEST:  (  ) Chest wall tenderness (specify location:       )    HEART:  Rate -->     ( x ) normal rate     (  ) bradycardic     (  ) tachycardic  Rhythm -->     ( x ) regular     (  ) regularly irregular     (  ) irregularly irregular  Murmurs -->     ( x ) normal s1s2     (  ) systolic murmur     (  ) diastolic murmur     (  ) continuous murmur      (  ) S3 present     (  ) S4 present    LUNG: audible congestion  ( x ) Unlabored respirations     (  ) tachypnea  ( x ) B/L air entry     (  ) decreased breath sounds in:  (location     )    ( x ) no adventitious sound     (  ) crackles     (  ) wheezing      (  ) rhonchi      (specify location:       )    ABDOMEN:   ( x ) Soft     (  ) tense   |   ( x ) nondistended     (  ) distended   |   ( x ) +BS     (  ) hypoactive bowel sounds     (  ) hyperactive bowel sounds  ( x ) nontender     (  ) RUQ tenderness     (  ) RLQ tenderness     (  ) LLQ tenderness     (  ) epigastric tenderness     (  ) diffuse tenderness  (  ) Splenomegaly      (  ) Hepatomegaly      (  ) Jaundice     (  ) ecchymosis     EXTREMITIES: legs contracted  (  ) Cyanosis    (  ) varicose veins    (  ) clubbing    (  ) gangrene:     (location:     )  (  ) pitting edema     (  ) non-pitting edema         SKIN:   (  ) rash:     (  ) pustules     (  ) vesicles     (  ) ulcer     (  ) ecchymosis     (specify location:     )    NERVOUS SYSTEM:  nonverbal  ( x ) A&Ox     (  ) confused     (  ) lethargic  CN II-XII:     (  ) grossly intact     (  ) deficits found     (Specify:     )   Upper extremities:     (  ) no sensorimotor deficits     (  ) weakness     (  ) loss of proprioception/vibration     (  ) loss of touch/temperature (specify:    )  Lower extremities:     (  ) no sensorimotor deficits     (  ) weakness     (  ) loss of proprioception/vibration     (  ) loss of touch/temperature (specify:    )    LABS:                        10.5   15.75 )-----------( 629      ( 01 Feb 2024 07:46 )             33.7     02-01    140  |  103  |  20  ----------------------------<  190<H>  4.6   |  23  |  0.8    Ca    8.8      01 Feb 2024 07:46  Mg     2.3     02-01    TPro  7.4  /  Alb  3.3<L>  /  TBili  0.4  /  DBili  x   /  AST  21  /  ALT  31  /  AlkPhos  99  02-01      Urinalysis Basic - ( 01 Feb 2024 07:46 )    Color: x / Appearance: x / SG: x / pH: x  Gluc: 190 mg/dL / Ketone: x  / Bili: x / Urobili: x   Blood: x / Protein: x / Nitrite: x   Leuk Esterase: x / RBC: x / WBC x   Sq Epi: x / Non Sq Epi: x / Bacteria: x                  ECHO, ENDO, EEG, RAD:    < from: TTE Echo Complete w/ Contrast w/o Doppler (01.22.24 @ 13:35) >     1. Technically difficult and limited study.   2. Endocardial visualization was enhanced with intravenous echo contrast.   3. Left ventricular ejection fraction, by visual estimation, is >55%.   4. Normal global left ventricular systolic function.   5. The left ventricular diastolic function could not be assessed in this   study.    < end of copied text >    < from: Xray Chest 1 View-PORTABLE IMMEDIATE (01.20.24 @ 17:29) >    Impression:    Stable todecreased bilateral opacities.    < end of copied text >    < from: Xray Chest 1 View AP/PA (01.21.24 @ 02:31) >    Findings:    Support devices: Precordial leads are present.  New right jugular vein   central venous catheter is present, with tip in the superior vena cava.    Cardiac/mediastinum/hilum: The cardiac silhouette is magnified.    Lung parenchyma/Pleura: Previously reported diminishing bilateral   pulmonary opacities are stable, No pleural effusion or pneumothorax is   seen.    Skeleton/soft tissues: Stable bones.    Impression:    Stable, diminishing bilateral pulmonary opacities.    < end of copied text >    < from: Xray Chest 1 View- PORTABLE-Routine (Xray Chest 1 View- PORTABLE-Routine in AM.) (01.22.24 @ 06:23) >    Impression:    Stable bilateral opacities.    < end of copied text >    < from: Xray Chest 1 View- PORTABLE-Urgent (Xray Chest 1 View- PORTABLE-Urgent .) (01.22.24 @ 13:07) >    Impression:    In comparison with the prior chest x-ray dated January 22, 2024 time 5:13   AM:    Interval placement of a nasogastric tube with the tip in the proximal   stomach.    Bilateral lung opacities, unchanged.    < end of copied text >    < from: VA Duplex Lower Ext Vein Scan, Bilat (01.22.24 @ 14:52) >    IMPRESSION:  No evidence of deep venous thrombosis in either lower extremity.    < end of copied text >    < from: Xray Chest 1 View- PORTABLE-Urgent (Xray Chest 1 View- PORTABLE-Urgent .) (01.23.24 @ 06:48) >    Impression:    Bilateral lung opacities.    < end of copied text >    < from: Xray Chest 1 View- PORTABLE-Routine (Xray Chest 1 View- PORTABLE-Routine in AM.) (01.24.24 @ 08:05) >    Impression:    Bilateral pneumonia.    Worsening.    Support devices as described.    < end of copied text >    < from: Xray Chest 1 View- PORTABLE-Routine (Xray Chest 1 View- PORTABLE-Routine in AM.) (01.25.24 @ 06:10) >    Impression:    Bilateral opacifications without difference. Support devices as described.    < end of copied text >    < from: Xray Abdomen 1 View PORTABLE -Routine (Xray Abdomen 1 View PORTABLE -Routine .) (01.27.24 @ 10:24) >    Findings/  Impression:    NG tube has been passed, tip overlying distal stomach, satisfactory   position.    A nonobstructive unremarkable bowel gas pattern is present    < end of copied text >    < from: Xray Chest 1 View-PORTABLE IMMEDIATE (Xray Chest 1 View-PORTABLE IMMEDIATE .) (01.30.24 @ 22:56) >    FINDINGS/  IMPRESSION:    NG tube is below the diaphragm out of the field-of-view.    Stable to slightly decreased bilateral opacities and interstitial   prominence. No pneumothorax.    Stable cardiomediastinal silhouette.    Unchanged osseous structures.    < end of copied text >    < from: Xray Chest 1 View- PORTABLE-Urgent (Xray Chest 1 View- PORTABLE-Urgent .) (01.31.24 @ 14:52) >    Impression:    Basilar opacifications, left greater than right. Support devices as   described.    < end of copied text >    < from: Xray Chest 1 View-PORTABLE IMMEDIATE (Xray Chest 1 View-PORTABLE IMMEDIATE .) (02.01.24 @ 03:02) >    FINDINGS/  IMPRESSION:    NG tube terminating at the level of the diaphragm. Recommend advancement.    Bibasal opacities without significant change. No pneumothorax.    Stable cardiomediastinal silhouette.    Unchanged osseous structures.    < end of copied text >

## 2024-02-01 NOTE — PROGRESS NOTE ADULT - ASSESSMENT
58F w/ PMHx Down Syndrome, nonverbal at baseline, DVT on eliquis 5mg BID,  Hypothyroidism, aspiration pneumonia in prev admissions, Cerebral palsy and Seizure Disorders presents to the ED from nursing facility/group home ([facility]) presented to ED for respiratory distress and high grade fever. Patient found to be septic on admission. Admitted for management of acute respiratory distress likely 2/2 CAP or aspiration PNA.    #Sepsis POA (fever, tachy and leukocytosis)  #Acute hypoxic resp failure 2/2 Aspiration PNA or RSV bronchiolitis  #history of Dysphagia - Puree Diet at baseline  - on admission VBG Lactate 2.1 improved to wnl, procal 0.04 pH wnl and CO2 mildly elevated 61  - s/p 2L LR bolus  - CXR shows- stable b/l pulmonary opacities  - MRSA negative   - BCx (1/20): Staph hominis repeat BCx have been NGTD  - UCx: neg  - RVP- RSV detected  - MRSA swab neg, urine strep neg, urine legionella neg  - d-dimer 605 and LE duplex neg  - fever 100.9 1/31 - repeat septic work up, possibly aspiration  - blood culture pending  - urine culture pending  - CXR pending   - fungitell +ve s/p Caspofungin on previous admission  - fungitell positive   PLAN  - ID recommendations pending  - SLP recommendations appreciated   - completed abx on 1/27  - per SLP failed FEES continue NPO w/ NGT, may benefit from humidification, chest PT, therapeutic PO trials w/ SLP only  - meropenem 1g q8   - requiring NRB, if persistently hypoxic may require upgrade to SDU    #hypotension  - continue with home dose midodrine 10mg three times daily, hold for SBP >110    #Elevated Troponin (stable at 35)  #tachycardia despite completion of antibiotics course  - Trop 25>37>35>35  - EKG sinus tachycardia  - repeat EKG unremarkable   - started on lopressor 25mg twice daily - if due to underlying infection consider discontinuing    #Acute on Chronic Normocytic Anemia (stable)  - Hgb 8.9 (previously 9.5)  - INR 1.43  - no evidence of hemorrhage  - b12 normal, folate normal, iron studies (ferritin 128)  - monitor H&H  - transfuse PRN, keep hgb >7    #h/o DVT of L Common Femoral Vein  - LE duplex neg  - on Eliquis 5mg BID at home    #Seizure Disorder  - Keppra 500mg twice daily     #Hypothyroidism  - not on any medications at home  - TSH- 0.51    #h/o Right Foot OM (1st toe stump and 2nd distal phalanx)  #h/o Multiple Left Foot deep tissue injuries  #h/o Sacral Wound  - past wound cultures grew Proteus and ESBL E coli  - Patient underwent excisional debridement of ulcer of right foot (including 1st metatarsal) and partial amputation 2nd toe on previous admissions  PLAN  - reposition q2hrs to prevent pressure injury  - local wound care  - no acute surgical interventions from podiatry and burn    #Down Syndrome  #Cerebral Palsy  #Non-Verbal    #misc  - gabapentin 300mg twice daily   - raloxifene 60mg daily     --------------------------------------------------  #DVT prophylaxis: eliquis   #GI prophylaxis: protonix  #MRSA prophylaxis: chlorhexidine 2% cloths  #Diet: NPO with tube feeds  #Activity: ambulate as tolerated  #Code status: full code  #Disposition: acute  --------------------------------------------------  #Handoff: see points bolded above and any points listed below  - discharge back [facility] once able to tolerate po diet or other intervention is made       58F w/ PMHx Down Syndrome, nonverbal at baseline, DVT on eliquis 5mg BID,  Hypothyroidism, aspiration pneumonia in prev admissions, Cerebral palsy and Seizure Disorders presents to the ED from nursing facility/group home ([facility]) presented to ED for respiratory distress and high grade fever. Patient found to be septic on admission. Admitted for management of acute respiratory distress likely 2/2 CAP or aspiration PNA.    #Sepsis POA (fever, tachy and leukocytosis)  #Acute hypoxic resp failure 2/2 Aspiration PNA or RSV bronchiolitis  #history of Dysphagia - Puree Diet at baseline  - on admission VBG Lactate 2.1 improved to wnl, procal 0.04 pH wnl and CO2 mildly elevated 61  - s/p 2L LR bolus  - CXR shows- stable b/l pulmonary opacities  - MRSA negative   - BCx (1/20): Staph hominis repeat BCx have been NGTD  - UCx: neg  - RVP- RSV detected  - MRSA swab neg, urine strep neg, urine legionella neg  - d-dimer 605 and LE duplex neg  - blood culture pending  - urine culture pending  - CXR pending Bibasal opacities without significant change.  - fungitell +ve s/p Caspofungin on previous admission  - fungitell positive   PLAN  - ID recommendations appreciated   - SLP recommendations appreciated   - completed abx on 1/27  - per ID fungitell overall downtrending and no clear source, antifungal treatment not indicated at this time   - per SLP failed FEES continue NPO w/ NGT, may benefit from humidification, chest PT, therapeutic PO trials w/ SLP only  - meropenem 1g q8   - requiring NRB, if persistently hypoxic may require upgrade to SDU    #hypotension  - continue with home dose midodrine 10mg three times daily, hold for SBP >110    #Elevated Troponin (stable at 35)  #tachycardia despite completion of antibiotics course  - Trop 25>37>35>35  - EKG sinus tachycardia  - repeat EKG unremarkable   - started on lopressor 25mg twice daily - if due to underlying infection consider discontinuing    #Acute on Chronic Normocytic Anemia (stable)  - Hgb 8.9 (previously 9.5)  - INR 1.43  - no evidence of hemorrhage  - b12 normal, folate normal, iron studies (ferritin 128)  - monitor H&H  - transfuse PRN, keep hgb >7    #h/o DVT of L Common Femoral Vein  - LE duplex neg  - on Eliquis 5mg BID at home    #Seizure Disorder  - Keppra 500mg twice daily     #Hypothyroidism  - not on any medications at home  - TSH- 0.51    #h/o Right Foot OM (1st toe stump and 2nd distal phalanx)  #h/o Multiple Left Foot deep tissue injuries  #h/o Sacral Wound  - past wound cultures grew Proteus and ESBL E coli  - Patient underwent excisional debridement of ulcer of right foot (including 1st metatarsal) and partial amputation 2nd toe on previous admissions  PLAN  - reposition q2hrs to prevent pressure injury  - local wound care  - no acute surgical interventions from podiatry and burn    #Down Syndrome  #Cerebral Palsy  #Non-Verbal    #misc  - gabapentin 300mg twice daily   - raloxifene 60mg daily     --------------------------------------------------  #DVT prophylaxis: eliquis   #GI prophylaxis: protonix  #MRSA prophylaxis: chlorhexidine 2% cloths  #Diet: NPO with tube feeds  #Activity: ambulate as tolerated  #Code status: full code  #Disposition: acute  --------------------------------------------------  #Handoff: see points bolded above and any points listed below  - discharge back to Group Home ([facility]) once able to tolerate po diet or other intervention is made       58F w/ PMHx Down Syndrome, nonverbal at baseline, DVT on eliquis 5mg BID,  Hypothyroidism, aspiration pneumonia in prev admissions, Cerebral palsy and Seizure Disorders presents to the ED from nursing facility/group home ([facility]) presented to ED for respiratory distress and high grade fever. Patient found to be septic on admission. Admitted for management of acute respiratory distress likely 2/2 CAP or aspiration PNA.    #Sepsis POA (fever, tachy and leukocytosis)  #Acute hypoxic resp failure 2/2 Aspiration PNA or RSV bronchiolitis  #history of Dysphagia - Puree Diet at baseline  - on admission VBG Lactate 2.1 improved to wnl, procal 0.04 pH wnl and CO2 mildly elevated 61  - s/p 2L LR bolus  - CXR shows- stable b/l pulmonary opacities  - MRSA negative   - BCx (1/20): Staph hominis repeat BCx have been NGTD  - UCx: neg  - RVP- RSV detected  - MRSA swab neg, urine strep neg, urine legionella neg  - d-dimer 605 and LE duplex neg  - blood culture pending  - urine culture pending  - CXR pending Bibasal opacities without significant change.  - fungitell +ve s/p Caspofungin on previous admission  - fungitell positive   PLAN  - ID recommendations appreciated   - SLP recommendations appreciated   - completed abx on 1/27  - per ID fungitell overall downtrending and no clear source, antifungal treatment not indicated at this time   - per SLP failed FEES continue NPO w/ NGT, may benefit from humidification, chest PT, therapeutic PO trials w/ SLP only  - meropenem 1g q8   - requiring NRB, if persistently hypoxic may require upgrade to SDU    #hypotension  - continue with home dose midodrine 10mg three times daily, hold for SBP >110    #Elevated Troponin (stable at 35)  #tachycardia despite completion of antibiotics course  - Trop 25>37>35>35  - EKG sinus tachycardia  - repeat EKG unremarkable   - started on lopressor 25mg twice daily - if due to underlying infection consider discontinuing    #Acute on Chronic Normocytic Anemia (stable)  - Hgb 8.9 (previously 9.5)  - INR 1.43  - no evidence of hemorrhage  - b12 normal, folate normal, iron studies (ferritin 128)  - monitor H&H  - transfuse PRN, keep hgb >7    #h/o DVT of L Common Femoral Vein  - LE duplex neg  - on Eliquis 5mg BID at home    #Seizure Disorder  - Keppra 500mg twice daily     #Hypothyroidism  - not on any medications at home  - TSH- 0.51    #h/o Right Foot OM (1st toe stump and 2nd distal phalanx)  #h/o Multiple Left Foot deep tissue injuries  #h/o Sacral Wound  - past wound cultures grew Proteus and ESBL E coli  - Patient underwent excisional debridement of ulcer of right foot (including 1st metatarsal) and partial amputation 2nd toe on previous admissions  PLAN  - reposition q2hrs to prevent pressure injury  - local wound care  - no acute surgical interventions from podiatry and burn    #Down Syndrome  #Cerebral Palsy  #Non-Verbal    #misc  - gabapentin 300mg twice daily   - raloxifene 60mg daily     --------------------------------------------------  #DVT prophylaxis: eliquis   #GI prophylaxis: protonix  #MRSA prophylaxis: chlorhexidine 2% cloths  #Diet: NPO with tube feeds  #Activity: ambulate as tolerated  #Code status: full code  #Disposition: acute  --------------------------------------------------  #Handoff: see points bolded above and any points listed below  - discharge back to Group Home ([facility]) once able to tolerate po diet or other intervention is made  - NGT removed at present, follow up on replacing it or other forms of nutrition  - prefer venti mask and NRB over high flow nasal cannula as patient notably breathes through her mouth

## 2024-02-01 NOTE — PROGRESS NOTE ADULT - ASSESSMENT
ASSESSMENT  58F w/ PMHx Down Syndrome, nonverbal at baseline, Hypothyroidism, Cerebral palsy and Seizure Disorders presents to the ED from nursing facility/group home presented to ED for respiratory distress and high grade fever.     IMPRESSION  #Fever  #Acute hypoxic respiratory failure- Gram Negative pneumonia   #1/20 BCX 1/4 bottles : Staphylococcus hominis- contaminant   Repeat CX NG   #Severe Sepsis on admission  #Elevated fungitell   Fungitell: 325 (01-20-24 @ 21:23)  Fungitell: 138 (11-07-23 @ 08:08)  Fungitell: 115 (11-02-23 @ 11:40)  Fungitell: >500 pg/mL (10.17.23 @ 17:20) s/p empiric caspo   #Full thickness ulcer right heel- Appreciate burn/podiatry evaluation.  #History of Right planter foot ulcers - two full thickness ulcers - serous drainage with mild erythema with OM  - MR Foot No Cont, Right (10.16.23 @ 21:51): IMPRESSION: 1.  Limited exam. 2.  Osteomyelitis of the first metatarsal stump. 3.  Osteomyelitis of the second toe distal phalanx.  - s/p excidional debridement to and including bone 1st metatarsal with partial 2nd digit amputation - 1st metatarsal head resected - Wound Cx Proteus ESBL   #CKD 2-3  #History of buttock ulcer  #Down syndrome/Cerebral Palsy     RECOMMENDATIONS  - F/u BCX  - Send UA, UCX (no barlow)  - Alicia 1g 8h IV  - Pt has had an elevated fungitell since 10/2023 without a clear source and s/p empiric caspo 10/20-10/26. Pt should not be at risk of invasive fungal infections. No evidence of candida on exam . Nonspecific test. Unclear why sent in the first place. Overall downtrending     If any questions, please send a message or call on [employer] Teams  Please continue to update ID with any pertinent new laboratory or radiographic findings.       ASSESSMENT  58F w/ PMHx Down Syndrome, nonverbal at baseline, Hypothyroidism, Cerebral palsy and Seizure Disorders presents to the ED from nursing facility/group home presented to ED for respiratory distress and high grade fever.     IMPRESSION  #Fever  < from: Xray Chest 1 View-PORTABLE IMMEDIATE (Xray Chest 1 View-PORTABLE IMMEDIATE .) (02.01.24 @ 03:02) >  Bibasal opacities without significant change.   #Acute hypoxic respiratory failure- Gram Negative pneumonia   #1/20 BCX 1/4 bottles : Staphylococcus hominis- contaminant   Repeat CX NG   #Severe Sepsis on admission  #Elevated fungitell   Fungitell: 325 (01-20-24 @ 21:23)  Fungitell: 138 (11-07-23 @ 08:08)  Fungitell: 115 (11-02-23 @ 11:40)  Fungitell: >500 pg/mL (10.17.23 @ 17:20) s/p empiric caspo   #Full thickness ulcer right heel- Appreciate burn/podiatry evaluation.  #History of Right planter foot ulcers - two full thickness ulcers - serous drainage with mild erythema with OM  - MR Foot No Cont, Right (10.16.23 @ 21:51): IMPRESSION: 1.  Limited exam. 2.  Osteomyelitis of the first metatarsal stump. 3.  Osteomyelitis of the second toe distal phalanx.  - s/p excidional debridement to and including bone 1st metatarsal with partial 2nd digit amputation - 1st metatarsal head resected - Wound Cx Proteus ESBL   #CKD 2-3  #History of buttock ulcer  #Down syndrome/Cerebral Palsy     RECOMMENDATIONS  - F/u BCX  - Send UA, UCX (no barlow)  - Alicia 1g 8h IV  - Pt has had an elevated fungitell since 10/2023 without a clear source and s/p empiric caspo 10/20-10/26. Pt should not be at risk of invasive fungal infections. No evidence of candida on exam . Nonspecific test. Unclear why sent in the first place. Overall downtrending     If any questions, please send a message or call on [facility] Teams  Please continue to update ID with any pertinent new laboratory or radiographic findings.

## 2024-02-01 NOTE — PROGRESS NOTE ADULT - SUBJECTIVE AND OBJECTIVE BOX
JERICHO TEA  58y, Female  Allergy: No Known Allergies      LOS  12d    CHIEF COMPLAINT: Respiratory Distress (31 Jan 2024 13:44)      INTERVAL EVENTS/HPI  - fever, rising WBC   - T(F): , Max: 102.1 (02-01-24 @ 05:00)  - Tolerating medication  - WBC Count: 15.75 (02-01-24 @ 07:46)  WBC Count: 9.65 (01-31-24 @ 18:21)     - Creatinine: 0.6 (01-31-24 @ 18:21)  Creatinine: 0.7 (01-31-24 @ 08:01)       ROS  unable to obtain history secondary to patient's mental status and/or sedation     VITALS:  T(F): 102, Max: 102.1 (02-01-24 @ 05:00)  HR: 108  BP: 116/81  RR: 19Vital Signs Last 24 Hrs  T(C): 38.9 (01 Feb 2024 09:00), Max: 38.9 (01 Feb 2024 05:00)  T(F): 102 (01 Feb 2024 09:00), Max: 102.1 (01 Feb 2024 05:00)  HR: 108 (01 Feb 2024 09:00) (108 - 133)  BP: 116/81 (01 Feb 2024 09:00) (104/52 - 116/81)  BP(mean): --  RR: 19 (01 Feb 2024 05:00) (19 - 20)  SpO2: --    Parameters below as of 01 Feb 2024 05:00  Patient On (Oxygen Delivery Method): room air        PHYSICAL EXAM:  ***    FH: Non-contributory  Social Hx: Non-contributory    TESTS & MEASUREMENTS:                        10.5   15.75 )-----------( 629      ( 01 Feb 2024 07:46 )             33.7     01-31    141  |  103  |  25<H>  ----------------------------<  123<H>  4.7   |  30  |  0.6<L>    Ca    8.6      31 Jan 2024 18:21  Mg     2.5     01-31    TPro  6.7  /  Alb  3.2<L>  /  TBili  <0.2  /  DBili  x   /  AST  21  /  ALT  30  /  AlkPhos  90  01-31      LIVER FUNCTIONS - ( 31 Jan 2024 18:21 )  Alb: 3.2 g/dL / Pro: 6.7 g/dL / ALK PHOS: 90 U/L / ALT: 30 U/L / AST: 21 U/L / GGT: x           Urinalysis Basic - ( 31 Jan 2024 18:21 )    Color: x / Appearance: x / SG: x / pH: x  Gluc: 123 mg/dL / Ketone: x  / Bili: x / Urobili: x   Blood: x / Protein: x / Nitrite: x   Leuk Esterase: x / RBC: x / WBC x   Sq Epi: x / Non Sq Epi: x / Bacteria: x        Culture - Urine (collected 01-23-24 @ 05:20)  Source: Clean Catch Clean Catch (Midstream)  Final Report (01-25-24 @ 00:26):    No growth    Culture - Blood (collected 01-22-24 @ 16:51)  Source: .Blood None  Final Report (01-28-24 @ 01:00):    No growth at 5 days    Culture - Urine (collected 01-21-24 @ 05:00)  Source: Clean Catch Clean Catch (Midstream)  Final Report (01-22-24 @ 07:15):    No growth    Culture - Blood (collected 01-20-24 @ 16:15)  Source: .Blood Blood-Peripheral  Final Report (01-25-24 @ 23:00):    No growth at 5 days    Culture - Blood (collected 01-20-24 @ 16:15)  Source: .Blood Blood-Peripheral  Gram Stain (01-21-24 @ 20:33):    Growth in aerobic bottle: Gram positive cocci in pairs  Final Report (01-22-24 @ 19:04):    Growth in aerobic bottle: Staphylococcus hominis    Isolation of Coagulase negative Staphylococcus from single blood culture    sets may represent    contamination. Contact the Microbiology Department at [phone] if    susceptibility testing is    clinically indicated.    Direct identification is available within approximately 3-5    hours either by Blood Panel Multiplexed PCR or Direct    MALDI-TOF. Details: [email or web address]  Organism: Blood Culture PCR (01-22-24 @ 19:04)  Organism: Blood Culture PCR (01-22-24 @ 19:04)      -  Coagulase negative Staphylococcus: Detec      Method Type: PCR            INFECTIOUS DISEASES TESTING  MRSA PCR Result.: Negative (01-22-24 @ 05:30)  Streptococcus pneumoniae Ag, Ur Result: Negative (01-21-24 @ 05:00)  Legionella Antigen, Urine: Negative (01-21-24 @ 05:00)  Rapid RVP Result: Detected (01-21-24 @ 00:58)  Procalcitonin, Serum: 0.51 (01-20-24 @ 21:23)  Fungitell: 325 (01-20-24 @ 21:23)  Vancomycin Level, Trough: 5.9 (11-12-23 @ 17:55)  Fungitell: 138 (11-07-23 @ 08:08)  Fungitell: 115 (11-02-23 @ 11:40)  Procalcitonin, Serum: 0.04 (11-02-23 @ 11:40)  Procalcitonin, Serum: 0.26 (10-24-23 @ 11:00)  MRSA PCR Result.: Negative (10-17-23 @ 17:20)  Fungitell: >500 (10-17-23 @ 17:20)  Procalcitonin, Serum: 4.36 (10-17-23 @ 17:20)  Procalcitonin, Serum: 4.18 (10-17-23 @ 12:35)  Procalcitonin, Serum: 0.07 (10-15-23 @ 07:28)  COVID-19 PCR: NotDetec (10-12-23 @ 09:14)  Procalcitonin, Serum: 0.40 (10-07-23 @ 10:54)  Streptococcus pneumoniae Ag, Ur Result: Negative (09-30-23 @ 14:36)  Legionella Antigen, Urine: Negative (09-30-23 @ 14:36)  MRSA PCR Result.: Negative (09-29-23 @ 16:50)  Procalcitonin, Serum: 9.78 (08-12-23 @ 11:25)  MRSA PCR Result.: Negative (08-12-23 @ 06:10)  Rapid RVP Result: NotDetec (08-12-23 @ 01:33)  Procalcitonin, Serum: 0.63 (08-10-23 @ 08:46)  Procalcitonin, Serum: 7.82 (08-04-23 @ 16:13)  MRSA PCR Result.: Negative (08-04-23 @ 14:52)  Rapid RVP Result: NotDetec (08-04-23 @ 03:00)  strept    INFLAMMATORY MARKERS  Sedimentation Rate, Erythrocyte: 67 mm/Hr (10-15-23 @ 07:28)  C-Reactive Protein, Serum: 16.1 mg/L (10-15-23 @ 07:28)      RADIOLOGY & ADDITIONAL TESTS:  I have personally reviewed the last available Chest xray  CXR  Xray Chest 1 View- PORTABLE-Urgent:   ACC: [id number] EXAM:  XR CHEST PORTABLE URGENT 1V   ORDERED BY: HUBER LANCASTER     PROCEDURE DATE:  01/31/2024          INTERPRETATION:  Clinical History / Reason for exam: Fever    Comparison : Chest radiograph prior day.    Technique/Positioning: Frontal portable, low lung volumes.    Findings:    Support devices: Enteric catheter extends below the hemidiaphragm    Cardiac/mediastinum/hilum: Unremarkable.    Lung parenchyma/Pleura: Basilar opacifications, left greater than right.    Skeleton/soft tissues: Unchanged    Impression:    Basilar opacifications, left greater than right. Support devices as   described.        --- End of Report ---            FAYE JOHNSON MD; Attending Interventional Radiologist  This document has been electronically signed. Jan 31 2024  2:53PM (01-31-24 @ 14:52)      CT      CARDIOLOGY TESTING  12 Lead ECG:   Ventricular Rate 88 BPM    Atrial Rate 88 BPM    P-R Interval 112 ms    QRS Duration 64 ms    Q-T Interval 362 ms    QTC Calculation(Bazett) 438 ms    P Axis -11 degrees    R Axis 95 degrees    T Axis 59 degrees    Diagnosis Line Normal sinus rhythm  Rightward axis  Borderline ECG    Confirmed by caleb roberts ([id number]) on 1/31/2024 2:01:26 PM (01-31-24 @ 11:11)  12 Lead ECG:   Ventricular Rate 135 BPM    Atrial Rate 135 BPM    P-R Interval 138 ms    QRS Duration 64 ms    Q-T Interval 286 ms    QTC Calculation(Bazett) 429 ms    P Axis 58 degrees    R Axis 90 degrees    T Axis 75 degrees    Diagnosis Line Sinus tachycardia  Rightward axis  Borderline ECG    Confirmed by Behuria, Supreeti ([id number]) on 1/21/2024 3:35:29 PM (01-20-24 @ 20:52)      MEDICATIONS  albuterol    90 MICROgram(s) HFA Inhaler 2 Inhalation two times a day  albuterol/ipratropium for Nebulization 3 Nebulizer every 6 hours  apixaban 5 Oral two times a day  artificial  tears Solution 1 Both EYES two times a day  calcium carbonate   1250 mG (OsCal) 1 Oral two times a day  chlorhexidine 2% Cloths 1 Topical daily  gabapentin 300 Oral every 12 hours  levETIRAcetam  Solution 500 Oral every 12 hours  melatonin 3 Oral at bedtime  metoprolol tartrate 25 Oral every 12 hours  midodrine. 10 Oral every 8 hours  pantoprazole  Injectable 40 IV Push every 24 hours  polyethylene glycol 3350 17 Oral at bedtime  raloxifene 60 Oral daily  senna 2 Oral at bedtime  silver sulfADIAZINE 1% Cream 1 Topical two times a day      WEIGHT  Weight (kg): 52.3 (01-21-24 @ 08:00)  Creatinine: 0.6 mg/dL (01-31-24 @ 18:21)      ANTIBIOTICS:      All available historical records have been reviewed       JERICHO TEA  58y, Female  Allergy: No Known Allergies      LOS  12d    CHIEF COMPLAINT: Respiratory Distress (31 Jan 2024 13:44)      INTERVAL EVENTS/HPI  - fever, rising WBC , resp distress on face mask   - T(F): , Max: 102.1 (02-01-24 @ 05:00)  - Tolerating medication  - WBC Count: 15.75 (02-01-24 @ 07:46)  WBC Count: 9.65 (01-31-24 @ 18:21)     - Creatinine: 0.6 (01-31-24 @ 18:21)  Creatinine: 0.7 (01-31-24 @ 08:01)       ROS  unable to obtain history secondary to patient's mental status and/or sedation     VITALS:  T(F): 102, Max: 102.1 (02-01-24 @ 05:00)  HR: 108  BP: 116/81  RR: 19Vital Signs Last 24 Hrs  T(C): 38.9 (01 Feb 2024 09:00), Max: 38.9 (01 Feb 2024 05:00)  T(F): 102 (01 Feb 2024 09:00), Max: 102.1 (01 Feb 2024 05:00)  HR: 108 (01 Feb 2024 09:00) (108 - 133)  BP: 116/81 (01 Feb 2024 09:00) (104/52 - 116/81)  BP(mean): --  RR: 19 (01 Feb 2024 05:00) (19 - 20)  SpO2: --    Parameters below as of 01 Feb 2024 05:00  Patient On (Oxygen Delivery Method): room air        PHYSICAL EXAM:  Gen: chronically ill appearing  facemask, contracted  HEENT: Normocephalic, atraumatic  Neck: supple, no lymphadenopathy  CV: Regular rate & regular rhythm  Lungs: decreased BS at bases, no fremitus  Abdomen: Soft, BS present  Ext: Warm, well perfused  Neuro: non focal, not following commands  Skin: no rash, no erythema  Lines: no phlebitis     FH: Non-contributory  Social Hx: Non-contributory    TESTS & MEASUREMENTS:                        10.5   15.75 )-----------( 629      ( 01 Feb 2024 07:46 )             33.7     01-31    141  |  103  |  25<H>  ----------------------------<  123<H>  4.7   |  30  |  0.6<L>    Ca    8.6      31 Jan 2024 18:21  Mg     2.5     01-31    TPro  6.7  /  Alb  3.2<L>  /  TBili  <0.2  /  DBili  x   /  AST  21  /  ALT  30  /  AlkPhos  90  01-31      LIVER FUNCTIONS - ( 31 Jan 2024 18:21 )  Alb: 3.2 g/dL / Pro: 6.7 g/dL / ALK PHOS: 90 U/L / ALT: 30 U/L / AST: 21 U/L / GGT: x           Urinalysis Basic - ( 31 Jan 2024 18:21 )    Color: x / Appearance: x / SG: x / pH: x  Gluc: 123 mg/dL / Ketone: x  / Bili: x / Urobili: x   Blood: x / Protein: x / Nitrite: x   Leuk Esterase: x / RBC: x / WBC x   Sq Epi: x / Non Sq Epi: x / Bacteria: x        Culture - Urine (collected 01-23-24 @ 05:20)  Source: Clean Catch Clean Catch (Midstream)  Final Report (01-25-24 @ 00:26):    No growth    Culture - Blood (collected 01-22-24 @ 16:51)  Source: .Blood None  Final Report (01-28-24 @ 01:00):    No growth at 5 days    Culture - Urine (collected 01-21-24 @ 05:00)  Source: Clean Catch Clean Catch (Midstream)  Final Report (01-22-24 @ 07:15):    No growth    Culture - Blood (collected 01-20-24 @ 16:15)  Source: .Blood Blood-Peripheral  Final Report (01-25-24 @ 23:00):    No growth at 5 days    Culture - Blood (collected 01-20-24 @ 16:15)  Source: .Blood Blood-Peripheral  Gram Stain (01-21-24 @ 20:33):    Growth in aerobic bottle: Gram positive cocci in pairs  Final Report (01-22-24 @ 19:04):    Growth in aerobic bottle: Staphylococcus hominis    Isolation of Coagulase negative Staphylococcus from single blood culture    sets may represent    contamination. Contact the Microbiology Department at [phone] if    susceptibility testing is    clinically indicated.    Direct identification is available within approximately 3-5    hours either by Blood Panel Multiplexed PCR or Direct    MALDI-TOF. Details: [email or web address]  Organism: Blood Culture PCR (01-22-24 @ 19:04)  Organism: Blood Culture PCR (01-22-24 @ 19:04)      -  Coagulase negative Staphylococcus: Detec      Method Type: PCR            INFECTIOUS DISEASES TESTING  MRSA PCR Result.: Negative (01-22-24 @ 05:30)  Streptococcus pneumoniae Ag, Ur Result: Negative (01-21-24 @ 05:00)  Legionella Antigen, Urine: Negative (01-21-24 @ 05:00)  Rapid RVP Result: Detected (01-21-24 @ 00:58)  Procalcitonin, Serum: 0.51 (01-20-24 @ 21:23)  Fungitell: 325 (01-20-24 @ 21:23)  Vancomycin Level, Trough: 5.9 (11-12-23 @ 17:55)  Fungitell: 138 (11-07-23 @ 08:08)  Fungitell: 115 (11-02-23 @ 11:40)  Procalcitonin, Serum: 0.04 (11-02-23 @ 11:40)  Procalcitonin, Serum: 0.26 (10-24-23 @ 11:00)  MRSA PCR Result.: Negative (10-17-23 @ 17:20)  Fungitell: >500 (10-17-23 @ 17:20)  Procalcitonin, Serum: 4.36 (10-17-23 @ 17:20)  Procalcitonin, Serum: 4.18 (10-17-23 @ 12:35)  Procalcitonin, Serum: 0.07 (10-15-23 @ 07:28)  COVID-19 PCR: NotDetec (10-12-23 @ 09:14)  Procalcitonin, Serum: 0.40 (10-07-23 @ 10:54)  Streptococcus pneumoniae Ag, Ur Result: Negative (09-30-23 @ 14:36)  Legionella Antigen, Urine: Negative (09-30-23 @ 14:36)  MRSA PCR Result.: Negative (09-29-23 @ 16:50)  Procalcitonin, Serum: 9.78 (08-12-23 @ 11:25)  MRSA PCR Result.: Negative (08-12-23 @ 06:10)  Rapid RVP Result: NotDetec (08-12-23 @ 01:33)  Procalcitonin, Serum: 0.63 (08-10-23 @ 08:46)  Procalcitonin, Serum: 7.82 (08-04-23 @ 16:13)  MRSA PCR Result.: Negative (08-04-23 @ 14:52)  Rapid RVP Result: NotDetec (08-04-23 @ 03:00)  strept    INFLAMMATORY MARKERS  Sedimentation Rate, Erythrocyte: 67 mm/Hr (10-15-23 @ 07:28)  C-Reactive Protein, Serum: 16.1 mg/L (10-15-23 @ 07:28)      RADIOLOGY & ADDITIONAL TESTS:  I have personally reviewed the last available Chest xray  CXR  Xray Chest 1 View- PORTABLE-Urgent:   ACC: [id number] EXAM:  XR CHEST PORTABLE URGENT 1V   ORDERED BY: HUBER LANCASTER     PROCEDURE DATE:  01/31/2024          INTERPRETATION:  Clinical History / Reason for exam: Fever    Comparison : Chest radiograph prior day.    Technique/Positioning: Frontal portable, low lung volumes.    Findings:    Support devices: Enteric catheter extends below the hemidiaphragm    Cardiac/mediastinum/hilum: Unremarkable.    Lung parenchyma/Pleura: Basilar opacifications, left greater than right.    Skeleton/soft tissues: Unchanged    Impression:    Basilar opacifications, left greater than right. Support devices as   described.        --- End of Report ---            FAYE JOHNSON MD; Attending Interventional Radiologist  This document has been electronically signed. Jan 31 2024  2:53PM (01-31-24 @ 14:52)      CT      CARDIOLOGY TESTING  12 Lead ECG:   Ventricular Rate 88 BPM    Atrial Rate 88 BPM    P-R Interval 112 ms    QRS Duration 64 ms    Q-T Interval 362 ms    QTC Calculation(Bazett) 438 ms    P Axis -11 degrees    R Axis 95 degrees    T Axis 59 degrees    Diagnosis Line Normal sinus rhythm  Rightward axis  Borderline ECG    Confirmed by caleb roberts ([id number]) on 1/31/2024 2:01:26 PM (01-31-24 @ 11:11)  12 Lead ECG:   Ventricular Rate 135 BPM    Atrial Rate 135 BPM    P-R Interval 138 ms    QRS Duration 64 ms    Q-T Interval 286 ms    QTC Calculation(Bazett) 429 ms    P Axis 58 degrees    R Axis 90 degrees    T Axis 75 degrees    Diagnosis Line Sinus tachycardia  Rightward axis  Borderline ECG    Confirmed by Behuria, Supreeti ([id number]) on 1/21/2024 3:35:29 PM (01-20-24 @ 20:52)      MEDICATIONS  albuterol    90 MICROgram(s) HFA Inhaler 2 Inhalation two times a day  albuterol/ipratropium for Nebulization 3 Nebulizer every 6 hours  apixaban 5 Oral two times a day  artificial  tears Solution 1 Both EYES two times a day  calcium carbonate   1250 mG (OsCal) 1 Oral two times a day  chlorhexidine 2% Cloths 1 Topical daily  gabapentin 300 Oral every 12 hours  levETIRAcetam  Solution 500 Oral every 12 hours  melatonin 3 Oral at bedtime  metoprolol tartrate 25 Oral every 12 hours  midodrine. 10 Oral every 8 hours  pantoprazole  Injectable 40 IV Push every 24 hours  polyethylene glycol 3350 17 Oral at bedtime  raloxifene 60 Oral daily  senna 2 Oral at bedtime  silver sulfADIAZINE 1% Cream 1 Topical two times a day      WEIGHT  Weight (kg): 52.3 (01-21-24 @ 08:00)  Creatinine: 0.6 mg/dL (01-31-24 @ 18:21)      ANTIBIOTICS:      All available historical records have been reviewed

## 2024-02-01 NOTE — CHART NOTE - NSCHARTNOTEFT_GEN_A_CORE
Transfer from:   Transfer to:   ----------------------------------------------------  HPI:  58F w/ PMHx Down Syndrome, nonverbal at baseline, Hypothyroidism, Cerebral palsy and Seizure Disorders presents to the ED from nursing facility/group home ([facility]) presented to ED for respiratory distress and high grade fever. Patient found to be septic on admission. As per isaias Savage at bedside, patient had been coughing and congested for 3x days. Denies fevers, chills, n/v/d or pain prior to admission. Patient is on a puree diet at baseline.    Vitals: Temp 104.5F (rectal), /74, , RR 24, SpO2 100% on BiPAP    Labs: Hgb 9.5 (previously 11.1), Platelet 422, INR 1.43, VBG Lactate 2.1    Imaging: CXR shows possible RML infiltrate    In the ED:  - s/p Cefepime 2g IV x1  - s/p Levaquin 750mg IV x1  - s/p 2L LR bolus    Admitted to SDU for management of acute respiratory distress 2/2 CAP/Aspiration PNA (20 Jan 2024 18:22)    ----------------------------------------------------  MICU COURSE:      FLOOR COURSE  Initially stable on the floor pending discharge once nutritional status was improved; patient requiring NGT feeding per SLP secondary to dysphagia, and failing FEES. Plan was to repeat SLP evaluation and if failing to consider PEG placement. Patient was received persistently tachycardic EKG NSR initially afebrile no evidence of DVT being treated with metoprolol, however on 1/31 spiked fever triggering septic work up, patient found congested, likely aspiration, required NRB/Highflow/Venti mask, started meropenem, upgraded to SDU for respiratory status monitoring.     ----------------------------------------------------  MEDICATIONS:  STANDING MEDICATIONS  albuterol    90 MICROgram(s) HFA Inhaler 2 Puff(s) Inhalation two times a day  albuterol/ipratropium for Nebulization 3 milliLiter(s) Nebulizer every 6 hours  apixaban 5 milliGRAM(s) Oral two times a day  artificial  tears Solution 1 Drop(s) Both EYES two times a day  calcium carbonate   1250 mG (OsCal) 1 Tablet(s) Oral two times a day  chlorhexidine 2% Cloths 1 Application(s) Topical daily  gabapentin 300 milliGRAM(s) Oral every 12 hours  levETIRAcetam  Solution 500 milliGRAM(s) Oral every 12 hours  melatonin 3 milliGRAM(s) Oral at bedtime  meropenem  IVPB 1000 milliGRAM(s) IV Intermittent every 8 hours  metoprolol tartrate 25 milliGRAM(s) Oral every 12 hours  midodrine. 10 milliGRAM(s) Oral every 8 hours  pantoprazole  Injectable 40 milliGRAM(s) IV Push every 24 hours  polyethylene glycol 3350 17 Gram(s) Oral at bedtime  raloxifene 60 milliGRAM(s) Oral daily  senna 2 Tablet(s) Oral at bedtime  silver sulfADIAZINE 1% Cream 1 Application(s) Topical two times a day    PRN MEDICATIONS  acetaminophen     Tablet .. 650 milliGRAM(s) Oral every 6 hours PRN  aluminum hydroxide/magnesium hydroxide/simethicone Suspension 30 milliLiter(s) Oral every 4 hours PRN  ondansetron Injectable 4 milliGRAM(s) IV Push every 8 hours PRN  sodium chloride 0.65% Nasal 1 Spray(s) Both Nostrils daily PRN      VITAL SIGNS: Last 24 Hours  T(C): 37.7 (01 Feb 2024 13:45), Max: 38.9 (01 Feb 2024 05:00)  T(F): 99.9 (01 Feb 2024 13:45), Max: 102.1 (01 Feb 2024 05:00)  HR: 141 (01 Feb 2024 13:45) (108 - 141)  BP: 152/73 (01 Feb 2024 13:45) (114/58 - 152/73)  BP(mean): --  RR: 18 (01 Feb 2024 13:45) (18 - 19)  SpO2: --    LABS:                        10.5   15.75 )-----------( 629      ( 01 Feb 2024 07:46 )             33.7     02-01    140  |  103  |  20  ----------------------------<  190<H>  4.6   |  23  |  0.8    Ca    8.8      01 Feb 2024 07:46  Mg     2.3     02-01    TPro  7.4  /  Alb  3.3<L>  /  TBili  0.4  /  DBili  x   /  AST  21  /  ALT  31  /  AlkPhos  99  02-01      Urinalysis Basic - ( 01 Feb 2024 11:40 )    Color: Yellow / Appearance: Turbid / SG: >1.030 / pH: x  Gluc: x / Ketone: Negative mg/dL  / Bili: Negative / Urobili: 1.0 mg/dL   Blood: x / Protein: 100 mg/dL / Nitrite: Negative   Leuk Esterase: Negative / RBC: 10 /HPF / WBC 5 /HPF   Sq Epi: x / Non Sq Epi: 2 /HPF / Bacteria: Negative /HPF      ABG - ( 01 Feb 2024 12:05 )  pH, Arterial: 7.56  pH, Blood: x     /  pCO2: 28    /  pO2: 156   / HCO3: 25    / Base Excess: 3.4   /  SaO2: 98.8                    RADIOLOGY:      ----------------------------------------------------  ASSESSMENT & PLAN:    58F w/ PMHx Down Syndrome, nonverbal at baseline, DVT on eliquis 5mg BID,  Hypothyroidism, aspiration pneumonia in prev admissions, Cerebral palsy and Seizure Disorders presents to the ED from nursing facility/group home ([facility]) presented to ED for respiratory distress and high grade fever. Patient found to be septic on admission. Admitted for management of acute respiratory distress likely 2/2 CAP or aspiration PNA.    #Sepsis POA (fever, tachy and leukocytosis)  #Acute hypoxic resp failure 2/2 Aspiration PNA or RSV bronchiolitis  #history of Dysphagia - Puree Diet at baseline  - on admission VBG Lactate 2.1 improved to wnl, procal 0.04 pH wnl and CO2 mildly elevated 61  - s/p 2L LR bolus  - CXR shows- stable b/l pulmonary opacities  - MRSA negative   - BCx (1/20): Staph hominis repeat BCx have been NGTD  - UCx: neg  - RVP- RSV detected  - MRSA swab neg, urine strep neg, urine legionella neg  - d-dimer 605 and LE duplex neg  - blood culture pending  - urine culture pending  - CXR pending Bibasal opacities without significant change.  - fungitell +ve s/p Caspofungin on previous admission  - fungitell positive   PLAN  - ID recommendations appreciated   - SLP recommendations appreciated   - completed abx on 1/27  - per ID fungitell overall downtrending and no clear source, antifungal treatment not indicated at this time   - per SLP failed FEES continue NPO w/ NGT, may benefit from humidification, chest PT, therapeutic PO trials w/ SLP only  - meropenem 1g q8   - requiring NRB, if persistently hypoxic may require upgrade to SDU    #hypotension  - continue with home dose midodrine 10mg three times daily, hold for SBP >110    #Elevated Troponin (stable at 35)  #tachycardia despite completion of antibiotics course  - Trop 25>37>35>35  - EKG sinus tachycardia  - repeat EKG unremarkable   - started on lopressor 25mg twice daily - if due to underlying infection consider discontinuing    #Acute on Chronic Normocytic Anemia (stable)  - Hgb 8.9 (previously 9.5)  - INR 1.43  - no evidence of hemorrhage  - b12 normal, folate normal, iron studies (ferritin 128)  - monitor H&H  - transfuse PRN, keep hgb >7    #h/o DVT of L Common Femoral Vein  - LE duplex neg  - on Eliquis 5mg BID at home    #Seizure Disorder  - Keppra 500mg twice daily     #Hypothyroidism  - not on any medications at home  - TSH- 0.51    #h/o Right Foot OM (1st toe stump and 2nd distal phalanx)  #h/o Multiple Left Foot deep tissue injuries  #h/o Sacral Wound  - past wound cultures grew Proteus and ESBL E coli  - Patient underwent excisional debridement of ulcer of right foot (including 1st metatarsal) and partial amputation 2nd toe on previous admissions  PLAN  - reposition q2hrs to prevent pressure injury  - local wound care  - no acute surgical interventions from podiatry and burn    #Down Syndrome  #Cerebral Palsy  #Non-Verbal    #misc  - gabapentin 300mg twice daily   - raloxifene 60mg daily     --------------------------------------------------  #DVT prophylaxis: eliquis   #GI prophylaxis: protonix  #MRSA prophylaxis: chlorhexidine 2% cloths  #Diet: NPO with tube feeds  #Activity: ambulate as tolerated  #Code status: full code  #Disposition: acute  --------------------------------------------------  #Handoff: see points bolded above and any points listed below  - [facility] back [facility] once able to tolerate po diet or other intervention is made  - NGT removed at present, follow up on replacing it or other forms of nutrition  - prefer venti mask and NRB over high flow nasal cannula as patient notably breathes through her mouth

## 2024-02-02 LAB
ALBUMIN SERPL ELPH-MCNC: 3.2 G/DL — LOW (ref 3.5–5.2)
ALP SERPL-CCNC: 103 U/L — SIGNIFICANT CHANGE UP (ref 30–115)
ALT FLD-CCNC: 22 U/L — SIGNIFICANT CHANGE UP (ref 0–41)
ANION GAP SERPL CALC-SCNC: 12 MMOL/L — SIGNIFICANT CHANGE UP (ref 7–14)
AST SERPL-CCNC: 21 U/L — SIGNIFICANT CHANGE UP (ref 0–41)
BASE EXCESS BLDA CALC-SCNC: 2.4 MMOL/L — SIGNIFICANT CHANGE UP (ref -2–3)
BASOPHILS # BLD AUTO: 0.07 K/UL — SIGNIFICANT CHANGE UP (ref 0–0.2)
BASOPHILS NFR BLD AUTO: 0.4 % — SIGNIFICANT CHANGE UP (ref 0–1)
BILIRUB SERPL-MCNC: 0.4 MG/DL — SIGNIFICANT CHANGE UP (ref 0.2–1.2)
BUN SERPL-MCNC: 18 MG/DL — SIGNIFICANT CHANGE UP (ref 10–20)
CALCIUM SERPL-MCNC: 8.7 MG/DL — SIGNIFICANT CHANGE UP (ref 8.4–10.5)
CHLORIDE SERPL-SCNC: 107 MMOL/L — SIGNIFICANT CHANGE UP (ref 98–110)
CO2 SERPL-SCNC: 26 MMOL/L — SIGNIFICANT CHANGE UP (ref 17–32)
CREAT SERPL-MCNC: 0.7 MG/DL — SIGNIFICANT CHANGE UP (ref 0.7–1.5)
EGFR: 101 ML/MIN/1.73M2 — SIGNIFICANT CHANGE UP
EOSINOPHIL # BLD AUTO: 0.02 K/UL — SIGNIFICANT CHANGE UP (ref 0–0.7)
EOSINOPHIL NFR BLD AUTO: 0.1 % — SIGNIFICANT CHANGE UP (ref 0–8)
GAS PNL BLDA: SIGNIFICANT CHANGE UP
GLUCOSE SERPL-MCNC: 108 MG/DL — HIGH (ref 70–99)
HCO3 BLDA-SCNC: 24 MMOL/L — SIGNIFICANT CHANGE UP (ref 21–28)
HCT VFR BLD CALC: 32.6 % — LOW (ref 37–47)
HGB BLD-MCNC: 9.9 G/DL — LOW (ref 12–16)
HOROWITZ INDEX BLDA+IHG-RTO: 80 — SIGNIFICANT CHANGE UP
IMM GRANULOCYTES NFR BLD AUTO: 0.6 % — HIGH (ref 0.1–0.3)
LACTATE SERPL-SCNC: 1 MMOL/L — SIGNIFICANT CHANGE UP (ref 0.7–2)
LYMPHOCYTES # BLD AUTO: 0.96 K/UL — LOW (ref 1.2–3.4)
LYMPHOCYTES # BLD AUTO: 5.5 % — LOW (ref 20.5–51.1)
MAGNESIUM SERPL-MCNC: 2.3 MG/DL — SIGNIFICANT CHANGE UP (ref 1.8–2.4)
MCHC RBC-ENTMCNC: 24.7 PG — LOW (ref 27–31)
MCHC RBC-ENTMCNC: 30.4 G/DL — LOW (ref 32–37)
MCV RBC AUTO: 81.3 FL — SIGNIFICANT CHANGE UP (ref 81–99)
MONOCYTES # BLD AUTO: 0.55 K/UL — SIGNIFICANT CHANGE UP (ref 0.1–0.6)
MONOCYTES NFR BLD AUTO: 3.1 % — SIGNIFICANT CHANGE UP (ref 1.7–9.3)
NEUTROPHILS # BLD AUTO: 15.85 K/UL — HIGH (ref 1.4–6.5)
NEUTROPHILS NFR BLD AUTO: 90.3 % — HIGH (ref 42.2–75.2)
NRBC # BLD: 0 /100 WBCS — SIGNIFICANT CHANGE UP (ref 0–0)
PCO2 BLDA: 30 MMHG — LOW (ref 32–45)
PH BLDA: 7.52 — HIGH (ref 7.35–7.45)
PLATELET # BLD AUTO: 540 K/UL — HIGH (ref 130–400)
PMV BLD: 10.7 FL — HIGH (ref 7.4–10.4)
PO2 BLDA: 161 MMHG — HIGH (ref 83–108)
POTASSIUM SERPL-MCNC: 4.3 MMOL/L — SIGNIFICANT CHANGE UP (ref 3.5–5)
POTASSIUM SERPL-SCNC: 4.3 MMOL/L — SIGNIFICANT CHANGE UP (ref 3.5–5)
PROCALCITONIN SERPL-MCNC: 0.22 NG/ML — HIGH (ref 0.02–0.1)
PROT SERPL-MCNC: 7 G/DL — SIGNIFICANT CHANGE UP (ref 6–8)
RBC # BLD: 4.01 M/UL — LOW (ref 4.2–5.4)
RBC # FLD: 20.3 % — HIGH (ref 11.5–14.5)
SAO2 % BLDA: 99.5 % — HIGH (ref 94–98)
SODIUM SERPL-SCNC: 145 MMOL/L — SIGNIFICANT CHANGE UP (ref 135–146)
WBC # BLD: 17.56 K/UL — HIGH (ref 4.8–10.8)
WBC # FLD AUTO: 17.56 K/UL — HIGH (ref 4.8–10.8)

## 2024-02-02 PROCEDURE — 71045 X-RAY EXAM CHEST 1 VIEW: CPT | Mod: 26,77

## 2024-02-02 PROCEDURE — 99233 SBSQ HOSP IP/OBS HIGH 50: CPT

## 2024-02-02 PROCEDURE — 71045 X-RAY EXAM CHEST 1 VIEW: CPT | Mod: 26

## 2024-02-02 RX ORDER — VANCOMYCIN HCL 1 G
500 VIAL (EA) INTRAVENOUS ONCE
Refills: 0 | Status: COMPLETED | OUTPATIENT
Start: 2024-02-02 | End: 2024-02-02

## 2024-02-02 RX ORDER — VANCOMYCIN HCL 1 G
500 VIAL (EA) INTRAVENOUS EVERY 12 HOURS
Refills: 0 | Status: DISCONTINUED | OUTPATIENT
Start: 2024-02-03 | End: 2024-02-05

## 2024-02-02 RX ORDER — HYDROCORTISONE 20 MG
100 TABLET ORAL EVERY 8 HOURS
Refills: 0 | Status: DISCONTINUED | OUTPATIENT
Start: 2024-02-02 | End: 2024-02-03

## 2024-02-02 RX ORDER — ACETAMINOPHEN 500 MG
1000 TABLET ORAL ONCE
Refills: 0 | Status: COMPLETED | OUTPATIENT
Start: 2024-02-02 | End: 2024-02-02

## 2024-02-02 RX ORDER — VANCOMYCIN HCL 1 G
VIAL (EA) INTRAVENOUS
Refills: 0 | Status: DISCONTINUED | OUTPATIENT
Start: 2024-02-02 | End: 2024-02-05

## 2024-02-02 RX ORDER — SODIUM CHLORIDE 9 MG/ML
500 INJECTION, SOLUTION INTRAVENOUS ONCE
Refills: 0 | Status: DISCONTINUED | OUTPATIENT
Start: 2024-02-02 | End: 2024-02-02

## 2024-02-02 RX ORDER — SODIUM CHLORIDE 9 MG/ML
1000 INJECTION INTRAMUSCULAR; INTRAVENOUS; SUBCUTANEOUS
Refills: 0 | Status: DISCONTINUED | OUTPATIENT
Start: 2024-02-02 | End: 2024-02-03

## 2024-02-02 RX ORDER — NOREPINEPHRINE BITARTRATE/D5W 8 MG/250ML
0.05 PLASTIC BAG, INJECTION (ML) INTRAVENOUS
Qty: 8 | Refills: 0 | Status: DISCONTINUED | OUTPATIENT
Start: 2024-02-02 | End: 2024-02-10

## 2024-02-02 RX ORDER — SODIUM CHLORIDE 9 MG/ML
1000 INJECTION, SOLUTION INTRAVENOUS ONCE
Refills: 0 | Status: COMPLETED | OUTPATIENT
Start: 2024-02-02 | End: 2024-02-02

## 2024-02-02 RX ADMIN — Medication 400 MILLIGRAM(S): at 18:50

## 2024-02-02 RX ADMIN — PANTOPRAZOLE SODIUM 40 MILLIGRAM(S): 20 TABLET, DELAYED RELEASE ORAL at 05:42

## 2024-02-02 RX ADMIN — Medication 100 MILLIGRAM(S): at 18:50

## 2024-02-02 RX ADMIN — Medication 1 DROP(S): at 05:44

## 2024-02-02 RX ADMIN — SODIUM CHLORIDE 1000 MILLILITER(S): 9 INJECTION, SOLUTION INTRAVENOUS at 20:51

## 2024-02-02 RX ADMIN — POLYETHYLENE GLYCOL 3350 17 GRAM(S): 17 POWDER, FOR SOLUTION ORAL at 21:22

## 2024-02-02 RX ADMIN — MEROPENEM 100 MILLIGRAM(S): 1 INJECTION INTRAVENOUS at 05:43

## 2024-02-02 RX ADMIN — MEROPENEM 100 MILLIGRAM(S): 1 INJECTION INTRAVENOUS at 21:23

## 2024-02-02 RX ADMIN — Medication 25 MILLIGRAM(S): at 17:38

## 2024-02-02 RX ADMIN — MEROPENEM 100 MILLIGRAM(S): 1 INJECTION INTRAVENOUS at 13:41

## 2024-02-02 RX ADMIN — SODIUM CHLORIDE 100 MILLILITER(S): 9 INJECTION INTRAMUSCULAR; INTRAVENOUS; SUBCUTANEOUS at 12:27

## 2024-02-02 RX ADMIN — GABAPENTIN 300 MILLIGRAM(S): 400 CAPSULE ORAL at 17:39

## 2024-02-02 RX ADMIN — Medication 1 APPLICATION(S): at 05:42

## 2024-02-02 RX ADMIN — LEVETIRACETAM 500 MILLIGRAM(S): 250 TABLET, FILM COATED ORAL at 17:40

## 2024-02-02 RX ADMIN — SENNA PLUS 2 TABLET(S): 8.6 TABLET ORAL at 21:23

## 2024-02-02 RX ADMIN — Medication 100 MILLIGRAM(S): at 21:52

## 2024-02-02 RX ADMIN — Medication 400 MILLIGRAM(S): at 06:12

## 2024-02-02 RX ADMIN — Medication 4.9 MICROGRAM(S)/KG/MIN: at 21:52

## 2024-02-02 RX ADMIN — Medication 1 DROP(S): at 18:51

## 2024-02-02 RX ADMIN — Medication 1 TABLET(S): at 17:39

## 2024-02-02 RX ADMIN — Medication 1 APPLICATION(S): at 17:40

## 2024-02-02 RX ADMIN — Medication 3 MILLILITER(S): at 19:37

## 2024-02-02 RX ADMIN — APIXABAN 5 MILLIGRAM(S): 2.5 TABLET, FILM COATED ORAL at 17:39

## 2024-02-02 RX ADMIN — CHLORHEXIDINE GLUCONATE 1 APPLICATION(S): 213 SOLUTION TOPICAL at 12:37

## 2024-02-02 RX ADMIN — Medication 3 MILLILITER(S): at 08:27

## 2024-02-02 RX ADMIN — MIDODRINE HYDROCHLORIDE 10 MILLIGRAM(S): 2.5 TABLET ORAL at 21:22

## 2024-02-02 RX ADMIN — Medication 3 MILLIGRAM(S): at 21:21

## 2024-02-02 NOTE — PROGRESS NOTE ADULT - SUBJECTIVE AND OBJECTIVE BOX
Interval History  Patient is a 58y old Female who presents with a chief complaint of Respiratory Distress (02 Feb 2024 11:33)    TEA ECHAVARRIA is admitted with a primary diagnosis of Sepsis with acute hypoxic respiratory failure      Today is hospital day 13d. The patient was examined at the bedside this morning. Patient was febrile overnight, T to 102    Admission HPI  HPI:  58F w/ PMHx Down Syndrome, nonverbal at baseline, Hypothyroidism, Cerebral palsy and Seizure Disorders presents to the ED from nursing facility/group home ([facility]) presented to ED for respiratory distress and high grade fever. Patient found to be septic on admission. As per aide Janette at bedside, patient had been coughing and congested for 3x days. Denies fevers, chills, n/v/d or pain prior to admission. Patient is on a puree diet at baseline.    Vitals: Temp 104.5F (rectal), /74, , RR 24, SpO2 100% on BiPAP    Labs: Hgb 9.5 (previously 11.1), Platelet 422, INR 1.43, VBG Lactate 2.1    Imaging: CXR shows possible RML infiltrate    In the ED:  - s/p Cefepime 2g IV x1  - s/p Levaquin 750mg IV x1  - s/p 2L LR bolus    Admitted to SDU for management of acute respiratory distress 2/2 CAP/Aspiration PNA (20 Jan 2024 18:22)      Past Medical and Surgical History  Down syndrome    Osteoporosis    Mild anemia    Neuropathy    S/P debridement  of R hip on 3/2/21      Family History  FAMILY HISTORY:    Social History  Social History:      Allergies  No Known Allergies    Medications  Standing Medications  albuterol    90 MICROgram(s) HFA Inhaler 2 Puff(s) Inhalation two times a day  albuterol/ipratropium for Nebulization 3 milliLiter(s) Nebulizer every 6 hours  apixaban 5 milliGRAM(s) Oral two times a day  artificial  tears Solution 1 Drop(s) Both EYES two times a day  calcium carbonate   1250 mG (OsCal) 1 Tablet(s) Oral two times a day  chlorhexidine 2% Cloths 1 Application(s) Topical daily  gabapentin 300 milliGRAM(s) Oral every 12 hours  levETIRAcetam  Solution 500 milliGRAM(s) Oral every 12 hours  melatonin 3 milliGRAM(s) Oral at bedtime  meropenem  IVPB 1000 milliGRAM(s) IV Intermittent every 8 hours  metoprolol tartrate 25 milliGRAM(s) Oral every 12 hours  midodrine. 10 milliGRAM(s) Oral every 8 hours  pantoprazole  Injectable 40 milliGRAM(s) IV Push every 24 hours  polyethylene glycol 3350 17 Gram(s) Oral at bedtime  raloxifene 60 milliGRAM(s) Oral daily  senna 2 Tablet(s) Oral at bedtime  silver sulfADIAZINE 1% Cream 1 Application(s) Topical two times a day  sodium chloride 0.9%. 1000 milliLiter(s) IV Continuous <Continuous>    PRN Medications  acetaminophen     Tablet .. 650 milliGRAM(s) Oral every 6 hours PRN  aluminum hydroxide/magnesium hydroxide/simethicone Suspension 30 milliLiter(s) Oral every 4 hours PRN  ondansetron Injectable 4 milliGRAM(s) IV Push every 8 hours PRN  sodium chloride 0.65% Nasal 1 Spray(s) Both Nostrils daily PRN    Vitals  T(F): 100.8  HR: 127  BP: 106/51  RR: 20  SpO2: 92%    I&O's      Physical Examination  General: Nonverbal, Down Syndrome  Head: Poor dentition  Cardiac: Tachycardic  Pulmonary: Congested, appreciate movement of mucous in upper and middle lobes  Abdominal: Soft, nontender, and nondistended  Extremities: No pitting edema  Neurologic: Nonverbal    Labs                        9.9    17.56 )-----------( 540      ( 02 Feb 2024 04:59 )             32.6     02-02    145  |  107  |  18  ----------------------------<  108<H>  4.3   |  26  |  0.7    Ca    8.7      02 Feb 2024 04:59  Mg     2.3     02-02    TPro  7.0  /  Alb  3.2<L>  /  TBili  0.4  /  DBili  x   /  AST  21  /  ALT  22  /  AlkPhos  103  02-02      Urinalysis Basic - ( 02 Feb 2024 04:59 )    Color: x / Appearance: x / SG: x / pH: x  Gluc: 108 mg/dL / Ketone: x  / Bili: x / Urobili: x   Blood: x / Protein: x / Nitrite: x   Leuk Esterase: x / RBC: x / WBC x   Sq Epi: x / Non Sq Epi: x / Bacteria: x      CAPILLARY BLOOD GLUCOSE        ABG - ( 01 Feb 2024 12:05 )  pH, Arterial: 7.56  pH, Blood: x     /  pCO2: 28    /  pO2: 156   / HCO3: 25    / Base Excess: 3.4   /  SaO2: 98.8                Imaging  CXR  Xray Chest 1 View- PORTABLE-Urgent:   ACC: [id number] EXAM:  XR CHEST PORTABLE URGENT 1V   ORDERED BY: SEVERINO PETERS     PROCEDURE DATE:  02/02/2024          INTERPRETATION:  Clinical History / Reason for exam: Line placement    Comparison : Chest radiograph February 2, 2024 at 8:39 AM.    Technique/Positioning: Single AP view of the chest.    Findings:    Support devices: Feeding tube in stomach    Cardiac/mediastinum/hilum: Unremarkable.    Lung parenchyma/Pleura: Developing bibasilar opacities/atelectasis. No   pneumothorax.    Skeleton/soft tissues: Unchanged    Impression:    Developing bibasilar opacities/atelectasis.        --- End of Report ---            ELLIOT LANDAU MD; Attending Radiologist  This document has been electronically signed. Feb 2 2024  1:55PM (02-02-24 @ 13:53)  Xray Chest 1 View- PORTABLE-Urgent:   ACC: [id number] EXAM:  XR CHEST PORTABLE URGENT 1V   ORDERED BY: HUBER LANCASTER     PROCEDURE DATE:  01/31/2024          INTERPRETATION:  Clinical History / Reason for exam: Fever    Comparison : Chest radiograph prior day.    Technique/Positioning: Frontal portable, low lung volumes.    Findings:    Support devices: Enteric catheter extends below the hemidiaphragm    Cardiac/mediastinum/hilum: Unremarkable.    Lung parenchyma/Pleura: Basilar opacifications, left greater than right.    Skeleton/soft tissues: Unchanged    Impression:    Basilar opacifications, left greater than right. Support devices as   described.        --- End of Report ---            FAYE JOHNSON MD; Attending Interventional Radiologist  This document has been electronically signed. Jan 31 2024  2:53PM (01-31-24 @ 14:52)      CT

## 2024-02-02 NOTE — PROGRESS NOTE ADULT - SUBJECTIVE AND OBJECTIVE BOX
Patient is a 58y old  Female who presents with a chief complaint of Respiratory Distress (01 Feb 2024 14:37)        Over Night Events:  On HFNC. Off pressors      ROS:     All pertinent ROS are negative except HPI         PHYSICAL EXAM    ICU Vital Signs Last 24 Hrs  T(C): 39.3 (02 Feb 2024 07:00), Max: 39.3 (02 Feb 2024 07:00)  T(F): 102.7 (02 Feb 2024 07:00), Max: 102.7 (02 Feb 2024 07:00)  HR: 130 (02 Feb 2024 07:00) (108 - 141)  BP: 105/56 (02 Feb 2024 07:00) (104/53 - 152/73)  BP(mean): 75 (02 Feb 2024 07:00) (73 - 76)  RR: 20 (02 Feb 2024 07:00) (18 - 20)  SpO2: 95% (02 Feb 2024 07:00) (93% - 99%)    O2 Parameters below as of 02 Feb 2024 07:00  Patient On (Oxygen Delivery Method): nasal cannula, high flow            CONSTITUTIONAL:  NAD    ENT:   Airway patent    EYES:   Pupils equal,   Round and reactive to light.    CARDIAC:   Normal rate,   Regular rhythm.      RESPIRATORY:   No wheezing  Bilateral BS  Normal chest expansion  Not tachypneic,  No use of accessory muscles    GASTROINTESTINAL:  Abdomen soft,   Non-tender,   No guarding,   + BS    MUSCULOSKELETAL:   No clubbing, cyanosis    NEUROLOGICAL:   Alert    SKIN:   Skin normal color for race,           LABS:                            10.5   15.75 )-----------( 629      ( 01 Feb 2024 07:46 )             33.7                                               02-01    140  |  103  |  20  ----------------------------<  190<H>  4.6   |  23  |  0.8    Ca    8.8      01 Feb 2024 07:46  Mg     2.3     02-01    TPro  7.4  /  Alb  3.3<L>  /  TBili  0.4  /  DBili  x   /  AST  21  /  ALT  31  /  AlkPhos  99  02-01                                             Urinalysis Basic - ( 01 Feb 2024 11:40 )    Color: Yellow / Appearance: Turbid / SG: >1.030 / pH: x  Gluc: x / Ketone: Negative mg/dL  / Bili: Negative / Urobili: 1.0 mg/dL   Blood: x / Protein: 100 mg/dL / Nitrite: Negative   Leuk Esterase: Negative / RBC: 10 /HPF / WBC 5 /HPF   Sq Epi: x / Non Sq Epi: 2 /HPF / Bacteria: Negative /HPF                                                  LIVER FUNCTIONS - ( 01 Feb 2024 07:46 )  Alb: 3.3 g/dL / Pro: 7.4 g/dL / ALK PHOS: 99 U/L / ALT: 31 U/L / AST: 21 U/L / GGT: x                                                  Culture - Blood (collected 31 Jan 2024 18:21)  Source: .Blood None  Preliminary Report (02 Feb 2024 01:03):    No growth at 24 hours    Culture - Blood (collected 31 Jan 2024 18:21)  Source: .Blood None  Preliminary Report (02 Feb 2024 01:03):    No growth at 24 hours                                                                                       ABG - ( 01 Feb 2024 12:05 )  pH, Arterial: 7.56  pH, Blood: x     /  pCO2: 28    /  pO2: 156   / HCO3: 25    / Base Excess: 3.4   /  SaO2: 98.8                MEDICATIONS  (STANDING):  albuterol    90 MICROgram(s) HFA Inhaler 2 Puff(s) Inhalation two times a day  albuterol/ipratropium for Nebulization 3 milliLiter(s) Nebulizer every 6 hours  apixaban 5 milliGRAM(s) Oral two times a day  artificial  tears Solution 1 Drop(s) Both EYES two times a day  calcium carbonate   1250 mG (OsCal) 1 Tablet(s) Oral two times a day  chlorhexidine 2% Cloths 1 Application(s) Topical daily  gabapentin 300 milliGRAM(s) Oral every 12 hours  levETIRAcetam  Solution 500 milliGRAM(s) Oral every 12 hours  melatonin 3 milliGRAM(s) Oral at bedtime  meropenem  IVPB 1000 milliGRAM(s) IV Intermittent every 8 hours  metoprolol tartrate 25 milliGRAM(s) Oral every 12 hours  midodrine. 10 milliGRAM(s) Oral every 8 hours  pantoprazole  Injectable 40 milliGRAM(s) IV Push every 24 hours  polyethylene glycol 3350 17 Gram(s) Oral at bedtime  raloxifene 60 milliGRAM(s) Oral daily  senna 2 Tablet(s) Oral at bedtime  silver sulfADIAZINE 1% Cream 1 Application(s) Topical two times a day    MEDICATIONS  (PRN):  acetaminophen     Tablet .. 650 milliGRAM(s) Oral every 6 hours PRN Temp greater or equal to 38C (100.4F), Mild Pain (1 - 3)  aluminum hydroxide/magnesium hydroxide/simethicone Suspension 30 milliLiter(s) Oral every 4 hours PRN Dyspepsia  ondansetron Injectable 4 milliGRAM(s) IV Push every 8 hours PRN Nausea and/or Vomiting  sodium chloride 0.65% Nasal 1 Spray(s) Both Nostrils daily PRN Nasal Congestion      New X-rays reviewed:                                                                                  ECHO    CXR interpreted by me:       Patient is a 58y old  Female who presents with a chief complaint of Respiratory Distress (01 Feb 2024 14:37)        Over Night Events:  On HFNC/ NRM. Off pressors, fever      PHYSICAL EXAM    ICU Vital Signs Last 24 Hrs  T(C): 39.3 (02 Feb 2024 07:00), Max: 39.3 (02 Feb 2024 07:00)  T(F): 102.7 (02 Feb 2024 07:00), Max: 102.7 (02 Feb 2024 07:00)  HR: 130 (02 Feb 2024 07:00) (108 - 141)  BP: 105/56 (02 Feb 2024 07:00) (104/53 - 152/73)  BP(mean): 75 (02 Feb 2024 07:00) (73 - 76)  RR: 20 (02 Feb 2024 07:00) (18 - 20)  SpO2: 95% (02 Feb 2024 07:00) (93% - 99%)    O2 Parameters below as of 02 Feb 2024 07:00  Patient On (Oxygen Delivery Method): nasal cannula, high flow            CONSTITUTIONAL:  ill looking      CARDIAC:   HARPREET 2.6    RESPIRATORY:   BL RHONCHI    GASTROINTESTINAL:  Abdomen soft,   Non-tender,   No guarding,   + BS    DO not follows commands        LABS:                            10.5   15.75 )-----------( 629      ( 01 Feb 2024 07:46 )             33.7                                               02-01    140  |  103  |  20  ----------------------------<  190<H>  4.6   |  23  |  0.8    Ca    8.8      01 Feb 2024 07:46  Mg     2.3     02-01    TPro  7.4  /  Alb  3.3<L>  /  TBili  0.4  /  DBili  x   /  AST  21  /  ALT  31  /  AlkPhos  99  02-01                                             Urinalysis Basic - ( 01 Feb 2024 11:40 )    Color: Yellow / Appearance: Turbid / SG: >1.030 / pH: x  Gluc: x / Ketone: Negative mg/dL  / Bili: Negative / Urobili: 1.0 mg/dL   Blood: x / Protein: 100 mg/dL / Nitrite: Negative   Leuk Esterase: Negative / RBC: 10 /HPF / WBC 5 /HPF   Sq Epi: x / Non Sq Epi: 2 /HPF / Bacteria: Negative /HPF                                                  LIVER FUNCTIONS - ( 01 Feb 2024 07:46 )  Alb: 3.3 g/dL / Pro: 7.4 g/dL / ALK PHOS: 99 U/L / ALT: 31 U/L / AST: 21 U/L / GGT: x                                                  Culture - Blood (collected 31 Jan 2024 18:21)  Source: .Blood None  Preliminary Report (02 Feb 2024 01:03):    No growth at 24 hours    Culture - Blood (collected 31 Jan 2024 18:21)  Source: .Blood None  Preliminary Report (02 Feb 2024 01:03):    No growth at 24 hours                                                                                       ABG - ( 01 Feb 2024 12:05 )  pH, Arterial: 7.56  pH, Blood: x     /  pCO2: 28    /  pO2: 156   / HCO3: 25    / Base Excess: 3.4   /  SaO2: 98.8                MEDICATIONS  (STANDING):  albuterol    90 MICROgram(s) HFA Inhaler 2 Puff(s) Inhalation two times a day  albuterol/ipratropium for Nebulization 3 milliLiter(s) Nebulizer every 6 hours  apixaban 5 milliGRAM(s) Oral two times a day  artificial  tears Solution 1 Drop(s) Both EYES two times a day  calcium carbonate   1250 mG (OsCal) 1 Tablet(s) Oral two times a day  chlorhexidine 2% Cloths 1 Application(s) Topical daily  gabapentin 300 milliGRAM(s) Oral every 12 hours  levETIRAcetam  Solution 500 milliGRAM(s) Oral every 12 hours  melatonin 3 milliGRAM(s) Oral at bedtime  meropenem  IVPB 1000 milliGRAM(s) IV Intermittent every 8 hours  metoprolol tartrate 25 milliGRAM(s) Oral every 12 hours  midodrine. 10 milliGRAM(s) Oral every 8 hours  pantoprazole  Injectable 40 milliGRAM(s) IV Push every 24 hours  polyethylene glycol 3350 17 Gram(s) Oral at bedtime  raloxifene 60 milliGRAM(s) Oral daily  senna 2 Tablet(s) Oral at bedtime  silver sulfADIAZINE 1% Cream 1 Application(s) Topical two times a day    MEDICATIONS  (PRN):  acetaminophen     Tablet .. 650 milliGRAM(s) Oral every 6 hours PRN Temp greater or equal to 38C (100.4F), Mild Pain (1 - 3)  aluminum hydroxide/magnesium hydroxide/simethicone Suspension 30 milliLiter(s) Oral every 4 hours PRN Dyspepsia  ondansetron Injectable 4 milliGRAM(s) IV Push every 8 hours PRN Nausea and/or Vomiting  sodium chloride 0.65% Nasal 1 Spray(s) Both Nostrils daily PRN Nasal Congestion

## 2024-02-02 NOTE — PROGRESS NOTE ADULT - ASSESSMENT
ASSESSMENT  58F w/ PMHx Down Syndrome, nonverbal at baseline, Hypothyroidism, Cerebral palsy and Seizure Disorders presents to the ED from nursing facility/group home presented to ED for respiratory distress and high grade fever.     IMPRESSION  #Fever  1/31 BCX NG   UA without significant pyuria   Procalcitonin, Serum: 0.22 (02-01-24 @ 16:00)- unremarkable   MRSA PCR Result.: Negative (01-22-24 @ 05:30)  < from: Xray Chest 1 View-PORTABLE IMMEDIATE (Xray Chest 1 View-PORTABLE IMMEDIATE .) (02.01.24 @ 03:02) >  Bibasal opacities without significant change.   #Acute hypoxic respiratory failure- Gram Negative pneumonia   #1/20 BCX 1/4 bottles : Staphylococcus hominis- contaminant   Repeat CX NG   #Severe Sepsis on admission  #RSV + 1/21  #Elevated fungitell   Fungitell: 325 (01-20-24 @ 21:23)  Fungitell: 138 (11-07-23 @ 08:08)  Fungitell: 115 (11-02-23 @ 11:40)  Fungitell: >500 pg/mL (10.17.23 @ 17:20) s/p empiric caspo   #Full thickness ulcer right heel- Appreciate burn/podiatry evaluation.  #History of Right planter foot ulcers - two full thickness ulcers - serous drainage with mild erythema with OM  - MR Foot No Cont, Right (10.16.23 @ 21:51): IMPRESSION: 1.  Limited exam. 2.  Osteomyelitis of the first metatarsal stump. 3.  Osteomyelitis of the second toe distal phalanx.  - s/p excidional debridement to and including bone 1st metatarsal with partial 2nd digit amputation - 1st metatarsal head resected - Wound Cx Proteus ESBL   #CKD 2-3 Creatinine: 0.7 mg/dL (02.02.24 @ 04:59)      #History of buttock ulcer  #Down syndrome/Cerebral Palsy     RECOMMENDATIONS  - BCX NG  - Send Flu / COVID PCR   - Repeat MRSA nares, while awaiting start Vanc 500mg q12h IV   - Alicia 1g 8h IV  - Pt has had an elevated fungitell since 10/2023 without a clear source and s/p empiric caspo 10/20-10/26. Pt should not be at risk of invasive fungal infections. No evidence of candida on exam . Nonspecific test. Unclear why sent in the first place. Overall downtrending   - CT CHest wo when stable    If any questions, please send a message or call on [employer] Teams  Please continue to update ID with any pertinent new laboratory or radiographic findings.

## 2024-02-02 NOTE — SWALLOW BEDSIDE ASSESSMENT ADULT - COMMENTS
Pt s/p FEES 1/30, recs for NPO w/ NGT.  Pt upgraded to SDU for worsening respiratory status. CXR 2/2-> Retrocardiac opacification, worsened.

## 2024-02-02 NOTE — PROGRESS NOTE ADULT - SUBJECTIVE AND OBJECTIVE BOX
58F w/ PMHx Down Syndrome, nonverbal at baseline, Hypothyroidism, Cerebral palsy and Seizure Disorders presents to the ED from nursing facility/group home ([facility]) presented to ED for respiratory distress and high grade fever. Patient found to be septic on admission.Admitted to SDU for management of acute respiratory distress 2/2 CAP/Aspiration PNA (20 Jan 2024 18:22)  While pt was on the floor she developed dyspnea after swallow evaluation, was spiking high grade fever, consulted by pulmonary/critical care and was upgraded back to SDU.  Today pt looks anxious, opens eyes to voice, nonverbal, aid at the bedside.     Patient was seen and examined.  Febrile this AM  Desaturated after swallow eval , pt was  placed on NRB  Tachycardic  Pt non verbal    PAST MEDICAL & SURGICAL HISTORY:  Down syndrome  Osteoporosis  Mild anemia  Neuropathy  S/P debridement of R hip on 3/2/21    Allergies  No Known Allergies    MEDICATIONS  (STANDING):  albuterol    90 MICROgram(s) HFA Inhaler 2 Puff(s) Inhalation two times a day  albuterol/ipratropium for Nebulization 3 milliLiter(s) Nebulizer every 6 hours  apixaban 5 milliGRAM(s) Oral two times a day  artificial  tears Solution 1 Drop(s) Both EYES two times a day  calcium carbonate   1250 mG (OsCal) 1 Tablet(s) Oral two times a day  chlorhexidine 2% Cloths 1 Application(s) Topical daily  gabapentin 300 milliGRAM(s) Oral every 12 hours  levETIRAcetam  Solution 500 milliGRAM(s) Oral every 12 hours  melatonin 3 milliGRAM(s) Oral at bedtime  meropenem  IVPB 1000 milliGRAM(s) IV Intermittent every 8 hours  metoprolol tartrate 25 milliGRAM(s) Oral every 12 hours  midodrine. 10 milliGRAM(s) Oral every 8 hours  pantoprazole  Injectable 40 milliGRAM(s) IV Push every 24 hours  polyethylene glycol 3350 17 Gram(s) Oral at bedtime  raloxifene 60 milliGRAM(s) Oral daily  senna 2 Tablet(s) Oral at bedtime  silver sulfADIAZINE 1% Cream 1 Application(s) Topical two times a day    MEDICATIONS  (PRN):  acetaminophen     Tablet .. 650 milliGRAM(s) Oral every 6 hours PRN Temp greater or equal to 38C (100.4F), Mild Pain (1 - 3)  aluminum hydroxide/magnesium hydroxide/simethicone Suspension 30 milliLiter(s) Oral every 4 hours PRN Dyspepsia  ondansetron Injectable 4 milliGRAM(s) IV Push every 8 hours PRN Nausea and/or Vomiting  sodium chloride 0.65% Nasal 1 Spray(s) Both Nostrils daily PRN Nasal Congestion    Vital Signs Last 24 Hrs  T(C): 37.7  T(F): 99.9  HR: 141  BP: 152/73  BP(mean): --  RR: 18  SpO2: --    O/E:  Awake, non verbal  not in distress. on NRB  HEENT: atraumatic, EOMI.  Chest:  coarse breath sounds  CVS: SIS2 +, tachycardia, no murmur.  P/A: Soft, BS+  CNS: awake , non verbal  Ext: contracted  Skin: no rash, no ulcers.  All systems reviewed positive findings as above.    POCT Blood Glucose.: 113 mg/dL (30 Jan 2024 21:56)                          10.5<L>  15.75<H> )-----------( 629<H>    ( 01 Feb 2024 07:46 )             33.7<L>                        9.4<L>  9.65  )-----------( 594<H>    ( 31 Jan 2024 18:21 )             30.7<L>    02-01    140  |  103  |  20  ----------------------------<  190<H>  4.6   |  23  |  0.8  01-31    141  |  103  |  25<H>  ----------------------------<  123<H>  4.7   |  30  |  0.6<L>    Ca    8.8      01 Feb 2024 07:46  Ca    8.6      31 Jan 2024 18:21  Ca    8.8      31 Jan 2024 08:01  Mg     2.3     02-01    TPro  7.4  /  Alb  3.3<L>  /  TBili  0.4  /  DBili  x   /  AST  21  /  ALT  31  /  AlkPhos  99  02-01  TPro  6.7  /  Alb  3.2<L>  /  TBili  <0.2  /  DBili  x   /  AST  21  /  ALT  30  /  AlkPhos  90  01-31  TPro  6.9  /  Alb  3.3<L>  /  TBili  <0.2  /  DBili  x   /  AST  23  /  ALT  34  /  AlkPhos  93  01-31            Urinalysis Basic - ( 01 Feb 2024 11:40 )    Color: Yellow / Appearance: Turbid / SG: >1.030 / pH: x  Gluc: x / Ketone: Negative mg/dL  / Bili: Negative / Urobili: 1.0 mg/dL   Blood: x / Protein: 100 mg/dL / Nitrite: Negative   Leuk Esterase: Negative / RBC: 10 /HPF / WBC 5 /HPF   Sq Epi: x / Non Sq Epi: 2 /HPF / Bacteria: Negative /HPF           58F w/ PMHx Down Syndrome, nonverbal at baseline, Hypothyroidism, Cerebral palsy and Seizure Disorders presents to the ED from nursing facility/group home ([facility]) presented to ED for respiratory distress and high grade fever. Patient found to be septic on admission.Admitted to SDU for management of acute respiratory distress 2/2 CAP/Aspiration PNA (20 Jan 2024 18:22)  While pt was on the floor she developed dyspnea after swallow evaluation, was spiking high grade fever, consulted by pulmonary/critical care and was upgraded back to SDU.  Today pt looks anxious, opens eyes to voice, nonverbal, aid at the bedside.       PAST MEDICAL & SURGICAL HISTORY:  Down syndrome  Osteoporosis  Mild anemia  Neuropathy  S/P debridement of R hip on 3/2/21    Allergies  No Known Allergies    Vital Signs Last 24 Hrs  T(C): 39.3 (02 Feb 2024 07:00), Max: 39.3 (02 Feb 2024 07:00)  T(F): 102.7 (02 Feb 2024 07:00), Max: 102.7 (02 Feb 2024 07:00)  HR: 130 (02 Feb 2024 07:00) (110 - 141)  BP: 105/56 (02 Feb 2024 07:00) (104/53 - 152/73)  BP(mean): 75 (02 Feb 2024 07:00) (73 - 76)  RR: 20 (02 Feb 2024 07:00) (18 - 20)  SpO2: 95% (02 Feb 2024 07:00) (93% - 99%)    Parameters below as of 02 Feb 2024 07:00  Patient On (Oxygen Delivery Method): nasal cannula, high flow      PHYSICAL EXAM:   GENERAL: in mild distress, anxious with eyes open, nonverbal   HEENT: atraumatic, EOMI.  Lungs:   coarse breath sounds  CVS: SIS2 +, tachycardia, no murmur.  Abdomen/ GI:  Soft,  NT, ND, BS+  CNS: awake , non verbal, anxious   Ext: contracted  Skin: no rash, no ulcers.    LABs:                           9.9    17.56 )-----------( 540      ( 02 Feb 2024 04:59 )             32.6     02-02    145  |  107  |  18  ----------------------------<  108<H>  4.3   |  26  |  0.7    Ca    8.7      02 Feb 2024 04:59  Mg     2.3     02-02    TPro  7.0  /  Alb  3.2<L>  /  TBili  0.4  /  DBili  x   /  AST  21  /  ALT  22  /  AlkPhos  103  02-02    Urinalysis Basic - ( 01 Feb 2024 11:40 )    Color: Yellow / Appearance: Turbid / SG: >1.030 / pH: x  Gluc: x / Ketone: Negative mg/dL  / Bili: Negative / Urobili: 1.0 mg/dL   Blood: x / Protein: 100 mg/dL / Nitrite: Negative   Leuk Esterase: Negative / RBC: 10 /HPF / WBC 5 /HPF   Sq Epi: x / Non Sq Epi: 2 /HPF / Bacteria: Negative /HPF    Culture - Blood (01.31.24 @ 18:21)   Specimen Source: .Blood None  Culture Results:   No growth at 24 hoursCulture - Urine (01.23.24 @ 05:20)   Specimen Source: Clean Catch Clean Catch (Midstream)  Culture Results:   No growthCulture - Blood (01.20.24 @ 16:15)   - Coagulase negative Staphylococcus: Detec  Gram Stain:   Growth in aerobic bottle: Gram positive cocci in pairs  Specimen Source: .Blood Blood-Peripheral  Organism: Blood Culture PCR  Culture Results:   Growth in aerobic bottle: Staphylococcus hominis   Isolation of Coagulase negative Staphylococcus from single blood culture     sets may represent   contamination. Contact the Microbiology Department at [phone] if   susceptibility testing is   clinically indicated.   Direct identification is available within approximately 3-5   hours either by Blood Panel Multiplexed PCR or Direct   MALDI-TOF. Details: [email or web address]  Organism Identification: Blood Culture PCR  Method Type: PCR< from: Xray Chest 1 View- PORTABLE-Routine (Xray Chest 1 View- PORTABLE-Routine .) (02.02.24 @ 09:22) >  Impression:    Retrocardiac opacification, worsened.    < end of copied text >  < from: [facility] Duplex Lower Ext Vein Scan, Bilat (01.22.24 @ 14:52) >  IMPRESSION:  No evidence of deep venous thrombosis in either lower extremity.    < end of copied text >  < from: TTE Echo Complete w/ Contrast w/o Doppler (01.22.24 @ 13:35) >  Summary:   1. Technically difficult and limited study.   2. Endocardial visualization was enhanced with intravenous echo contrast.   3. Left ventricular ejection fraction, by visual estimation, is >55%.   4. Normal global left ventricular systolic function.   5. The left ventricular diastolic function could not be assessed in this   study.      MEDICATIONS  (STANDING):  albuterol    90 MICROgram(s) HFA Inhaler 2 Puff(s) Inhalation two times a day  albuterol/ipratropium for Nebulization 3 milliLiter(s) Nebulizer every 6 hours  apixaban 5 milliGRAM(s) Oral two times a day  artificial  tears Solution 1 Drop(s) Both EYES two times a day  calcium carbonate   1250 mG (OsCal) 1 Tablet(s) Oral two times a day  chlorhexidine 2% Cloths 1 Application(s) Topical daily  gabapentin 300 milliGRAM(s) Oral every 12 hours  levETIRAcetam  Solution 500 milliGRAM(s) Oral every 12 hours  melatonin 3 milliGRAM(s) Oral at bedtime  meropenem  IVPB 1000 milliGRAM(s) IV Intermittent every 8 hours  metoprolol tartrate 25 milliGRAM(s) Oral every 12 hours  midodrine. 10 milliGRAM(s) Oral every 8 hours  pantoprazole  Injectable 40 milliGRAM(s) IV Push every 24 hours  polyethylene glycol 3350 17 Gram(s) Oral at bedtime  raloxifene 60 milliGRAM(s) Oral daily  senna 2 Tablet(s) Oral at bedtime  silver sulfADIAZINE 1% Cream 1 Application(s) Topical two times a day  sodium chloride 0.9%. 1000 milliLiter(s) (100 mL/Hr) IV Continuous <Continuous>    MEDICATIONS  (PRN):  acetaminophen     Tablet .. 650 milliGRAM(s) Oral every 6 hours PRN Temp greater or equal to 38C (100.4F), Mild Pain (1 - 3)  aluminum hydroxide/magnesium hydroxide/simethicone Suspension 30 milliLiter(s) Oral every 4 hours PRN Dyspepsia  ondansetron Injectable 4 milliGRAM(s) IV Push every 8 hours PRN Nausea and/or Vomiting  sodium chloride 0.65% Nasal 1 Spray(s) Both Nostrils daily PRN Nasal Congestion

## 2024-02-02 NOTE — PROGRESS NOTE ADULT - ASSESSMENT
58F w/ PMHx Down Syndrome, nonverbal at baseline, DVT on eliquis 5mg BID,  Hypothyroidism, aspiration pneumonia in prev admissions, Cerebral palsy and Seizure Disorders presents to the ED from nursing facility/group home ([facility]) presented to ED for respiratory distress and high grade fever. Patient found to be septic on admission. Admitted for management of acute respiratory distress likely 2/2 CAP or aspiration PNA.    #Sepsis POA (fever, tachy and leukocytosis)  #Acute hypoxic resp failure 2/2 Aspiration PNA or RSV bronchiolitis  #Hx of Dysphagia - Puree Diet at baseline  - On admission: VBG Lactate 2.1 improved to wnl, procal 0.04 pH wnl and CO2 mildly elevated 61  - MRSA negative   - BCx (1/20): Staph hominis repeat BCx have been NGTD  - UCx: Neg  - RVP: RSV detected  - MRSA swab neg, urine strep neg, urine legionella neg  - D-dimer 605 and LE duplex neg  - CXR pending Bibasal opacities without significant change.  - Fungitell chronically positive, received Caspofungin on previous admission  - Repeat Flu/Covid/MRSA nares per ID  - Start Vancomycin 500mg IV q12 per ID, f/u repeat MRSA nares  - Start NS @100cc/hr for 24 hours  - C/w Meropenem 1g IV q8hr  - C/w Tube Feeding by NGT, will need PEG eval when respiratory status improves  - Currently on HFNC and NRB, continue to monitor saturation if increased WOB and desat might need intubation  - Monitor fever curve    #Hypotension  - C/w home midodrine 10mg TID, hold for SBP >110  - Monitor BP as patient is spiking fevers  - Keep MAP >65    #Elevated Troponin (Stabilized)  #Sinus Tachycardia  - Troponins: 25>37>35>35  - EKG shows sinus tachycardia, repeated  - Could likely be from hypovolemia/infectious state  - C/w Lopressor 25mg BID  - Start NS @100cc/hr for 24 hours    #Acute on Chronic Normocytic Anemia (Stable)  #Iron Deficiency  - On admission: HgB 8.9 (previously 9.5)  - No evidence of hemorrhage  - B12 and Folate WNL normal  - Total Iron 15, Iron Saturation 6%, Ferritin 128 reflecting potential TIFFANI  - Hold off on iron supplementation in setting of suspected infection  - Keep HgB >7    #Hx of DVT of L Common Femoral Vein  - LE Duplex neg  - C/w Eliquis 5mg BID    #Seizure Disorder  - C/w Keppra 500mg BID Keppra 500mg    #Hx of Right Foot OM (1st Toe Stump and 2nd Distal Phalanx)  #Hx of Multiple Left Foot DTIs  #Hx of Sacral Wound  - Previous wound cultures positive for Proteus and ESBL E coli  - Patient underwent excisional debridement of ulcer of right foot (including 1st metatarsal) and partial amputation 2nd toe on previous admissions  - No acute surgical interventions from podiatry and burn  - C/w q2hr repositioning  - C/w local wound care    #Down Syndrome  #Cerebral Palsy  #Non-Verbal  #Post-Menopausal Bone Loss  - C/w Gabapentin 300mg BID  - C/w Raloxifene 60mg QD    #Misc  #Code Status: Full Code  #DVT ppx: Eliquis   #GI prophylaxis: Protonix  #Diet: Tube Feeds  #Disposition: SDU 58F w/ PMHx Down Syndrome, nonverbal at baseline, DVT on eliquis 5mg BID,  Hypothyroidism, aspiration pneumonia in prev admissions, Cerebral palsy and Seizure Disorders presents to the ED from nursing facility/group home ([facility] presented to ED for respiratory distress and high grade fever. Patient found to be septic on admission. Admitted for management of acute respiratory distress likely 2/2 CAP or aspiration PNA.    #Sepsis POA (fever, tachy and leukocytosis)  #Acute hypoxic resp failure 2/2 Aspiration PNA or RSV bronchiolitis  #Hx of Dysphagia - Puree Diet at baseline  - On admission: VBG Lactate 2.1 improved to wnl, procal 0.04 pH wnl and CO2 mildly elevated 61  - MRSA negative   - BCx (1/20): Staph hominis repeat BCx have been NGTD  - UCx: Neg  - RVP: RSV detected  - MRSA swab neg, urine strep neg, urine legionella neg  - D-dimer 605 and LE duplex neg  - CXR pending Bibasal opacities without significant change.  - Fungitell chronically positive, received Caspofungin on previous admission  - Repeat Flu/Covid/MRSA nares per ID  - Start Vancomycin 500mg IV q12 per ID, f/u repeat MRSA nares  - Start NS @100cc/hr for 24 hours  - C/w Meropenem 1g IV q8hr  - C/w Tube Feeding by NGT, will need PEG eval when respiratory status improves  - Currently on HFNC and NRB, continue to monitor saturation if increased WOB and desat might need intubation  - Monitor fever curve    #Hypotension  - C/w home midodrine 10mg TID, hold for SBP >110  - Monitor BP as patient is spiking fevers  - Keep MAP >65    #Elevated Troponin (Stabilized)  #Sinus Tachycardia  - Troponins: 25>37>35>35  - EKG shows sinus tachycardia, repeated  - Could likely be from hypovolemia/infectious state  - C/w Lopressor 25mg BID  - Start NS @100cc/hr for 24 hours    #Acute on Chronic Normocytic Anemia (Stable)  #Iron Deficiency  - On admission: HgB 8.9 (previously 9.5)  - No evidence of hemorrhage  - B12 and Folate WNL normal  - Total Iron 15, Iron Saturation 6%, Ferritin 128 reflecting potential TIFFANI  - Hold off on iron supplementation in setting of suspected infection  - Keep HgB >7    #Hx of DVT of L Common Femoral Vein  - LE Duplex negative for DVT  - C/w Eliquis 5mg BID    #Seizure Disorder  - C/w Keppra 500mg BID     #Hx of Right Foot OM (1st Toe Stump and 2nd Distal Phalanx)  #Hx of Multiple Left Foot DTIs  #Hx of Sacral Wound  - Previous wound cultures positive for Proteus and ESBL E coli  - Patient underwent excisional debridement of ulcer of right foot (including 1st metatarsal) and partial amputation 2nd toe on previous admissions  - No acute surgical interventions from podiatry and burn  - C/w q2hr repositioning  - C/w local wound care    #Down Syndrome  #Cerebral Palsy  #Non-Verbal  #Post-Menopausal Bone Loss  - C/w Gabapentin 300mg BID  - C/w Raloxifene 60mg QD    #Misc  #Code Status: Full Code  #DVT ppx: Eliquis   #GI prophylaxis: Protonix  #Diet: Tube Feeds  #Disposition: SDU

## 2024-02-02 NOTE — PROGRESS NOTE ADULT - ASSESSMENT
58F w/ PMHx Down Syndrome, nonverbal at baseline, Hypothyroidism, Cerebral palsy and Seizure Disorders presents to the ED from nursing facility/group home ([facility]) presented to ED for respiratory distress and high grade fever. Patient found to be septic on admission.Admitted to SDU for management of acute respiratory distress 2/2 CAP/Aspiration PNA (20 Jan 2024 18:22)    # Sepsis POA   # Lactic acidosis- resolved  # Acute hypoxic resp failure sec to aspiration pneumonia  # RSV bronchiolitis   # H/o Dysphagia  -  Xray Chest 1 View-PORTABLE IMMEDIATE (Xray Chest 1 View-PORTABLE IMMEDIATE .) (02.01.24 @ 03:02) >Bibasal opacities without significant change. No pneumothorax.  -  RVP- RSV detected  - MRSA negative   - BCx (1/20): Staph hominis -  contaminant repeat BCx have been NGTD  - Failed FEES --> c/w  NG tube feeds  - Aspiration precautions  - c/w duoneb  - repeat blood cultures   - maintain oxygen sat > 94  - F/u blood cultures  - pt was started on Meropenem  - Solumedrol 125mg IV x1 now  - ABG  - critical care eval for upgrade    # Hyperkalemia- resolved  - S/p lokelma 10mg  - nutrition f/u for change in tube feeds: Recommend Nepro with carbsteady via NG tube - 270 mL q8hr provides 1458 kcal, 65 g protein, 588 mL free water -- flush with 100 mL pre/post feeds50 mL free water q4hr .  Prosource TF once daily. HOB elevated > 45 degrees  - monitor BMP    # H/o hypotension  - IV fluid bolusx1  - c/w midodrine  - monitor BP    # Elevated Troponin , type 2 M sec to infection  # Sinus tachycardia sec to fever  - Troponin T, High Sensitivity Result: 35:(01.21.24 @ 00:28)  - Repeat EKG today: 12 Lead ECG (01.31.24 @ 11:11) >Normal sinus rhythm  - c/w  metoprolol    # Seizure Disorder  -c/w  Keppra   - seizure precautions    # H/o lower ext DVT  - c/w eliquis    # Hypothyroidism  - Thyroid Stimulating Hormone, Serum: 0.51 uIU/mL (01.21.24 @ 06:34)    # Normocytic Anemia - stable    # Down Syndrome  # Cerebral Palsy, non verbal    # Full code    #Pending: F/u blood cultures, UA , urine cultures, monitor BMP, monitor BP, ABG, critical care eval  # Disposition: group home when stable   58F w/ PMHx Down Syndrome, nonverbal at baseline, Hypothyroidism, Cerebral palsy and Seizure Disorders presents to the ED from nursing facility/group home ([facility]) presented to ED for respiratory distress and high grade fever. Patient found to be septic on admission.Admitted to SDU for management of acute respiratory distress 2/2 CAP/Aspiration PNA (20 Jan 2024 18:22)  While pt was on the floor she developed dyspnea after swallow evaluation, was spiking high grade fever, consulted by pulmonary/critical care and was upgraded back to SDU.    A/P  # Sepsis POA / Acute hypoxic resp failure / RSV bronchiolitis /  H/o Dysphagia/ suspected aspiration  - pt is spiking high grade fever on broad spectrum ABx, procalcitonin is low, doubt bacterial infection, send full respiratory panel   - MRSA negative   - BCx (1/20): Staph hominis -  contaminant repeat BCx have been NGTD  - Failed FEES, put  NG tube for feedings and medications, pt'll likely need PEG   - Aspiration precautions  - c/w duoneb  -  f/u repeat blood cultures   - pt was consulted by ID, c/w  Meropenem  - pulmonary is following     # H/o hypotension  - start maintenance fluids   - c/w midodrine, keep MAP above 65     # Elevated Troponin , type 2 MI/  Sinus tachycardia  - c/w telemetry monitoring   - Repeat EKG: Normal sinus rhythm  - c/w  metoprolol  - start IV hydration, maintain fluid balance     # Seizure Disorder  - c/w  Keppra   - seizure precautions  - keep Mg above 2.0     # H/o lower ext DVT  - c/w Eliquis    # Hypothyroidism  - Thyroid Stimulating Hormone, Serum: 0.51 uIU/mL (01.21.24 @ 06:34)  - check FT4     # Normocytic Anemia   - stable    # Down Syndrome/  Cerebral Palsy  - supportive care  - prevent falls and aspiration   - failed speech and swallow, needs PEG   - on Raloxifene     # Full code    # GI prophylaxis

## 2024-02-02 NOTE — SWALLOW BEDSIDE ASSESSMENT ADULT - SWALLOW EVAL: DIAGNOSIS
PO trials not given 2' poor respiratory status and increased risk fo aspiration PO trials not given 2' poor respiratory status and increased risk for aspiration

## 2024-02-02 NOTE — PROGRESS NOTE ADULT - SUBJECTIVE AND OBJECTIVE BOX
TEA ECHAVARRIA  58y, Female  Allergy: No Known Allergies      LOS  13d    CHIEF COMPLAINT: Respiratory Distress (02 Feb 2024 08:13)      INTERVAL EVENTS/HPI  - febrile this AM   - T(F): , Max: 102.7 (02-02-24 @ 07:00)  - Tolerating medication  - WBC Count: 17.56 (02-02-24 @ 04:59)  WBC Count: 15.75 (02-01-24 @ 07:46)     - Creatinine: 0.7 (02-02-24 @ 04:59)  Creatinine: 0.8 (02-01-24 @ 07:46)       ROS  ***    VITALS:  T(F): 102.7, Max: 102.7 (02-02-24 @ 07:00)  HR: 130  BP: 105/56  RR: 20Vital Signs Last 24 Hrs  T(C): 39.3 (02 Feb 2024 07:00), Max: 39.3 (02 Feb 2024 07:00)  T(F): 102.7 (02 Feb 2024 07:00), Max: 102.7 (02 Feb 2024 07:00)  HR: 130 (02 Feb 2024 07:00) (110 - 141)  BP: 105/56 (02 Feb 2024 07:00) (104/53 - 152/73)  BP(mean): 75 (02 Feb 2024 07:00) (73 - 76)  RR: 20 (02 Feb 2024 07:00) (18 - 20)  SpO2: 95% (02 Feb 2024 07:00) (93% - 99%)    Parameters below as of 02 Feb 2024 07:00  Patient On (Oxygen Delivery Method): nasal cannula, high flow        PHYSICAL EXAM:  ***    FH: Non-contributory  Social Hx: Non-contributory    TESTS & MEASUREMENTS:                        9.9    17.56 )-----------( 540      ( 02 Feb 2024 04:59 )             32.6     02-02    145  |  107  |  18  ----------------------------<  108<H>  4.3   |  26  |  0.7    Ca    8.7      02 Feb 2024 04:59  Mg     2.3     02-02    TPro  7.0  /  Alb  3.2<L>  /  TBili  0.4  /  DBili  x   /  AST  21  /  ALT  22  /  AlkPhos  103  02-02      LIVER FUNCTIONS - ( 02 Feb 2024 04:59 )  Alb: 3.2 g/dL / Pro: 7.0 g/dL / ALK PHOS: 103 U/L / ALT: 22 U/L / AST: 21 U/L / GGT: x           Urinalysis Basic - ( 02 Feb 2024 04:59 )    Color: x / Appearance: x / SG: x / pH: x  Gluc: 108 mg/dL / Ketone: x  / Bili: x / Urobili: x   Blood: x / Protein: x / Nitrite: x   Leuk Esterase: x / RBC: x / WBC x   Sq Epi: x / Non Sq Epi: x / Bacteria: x        Culture - Blood (collected 01-31-24 @ 18:21)  Source: .Blood None  Preliminary Report (02-02-24 @ 01:03):    No growth at 24 hours    Culture - Blood (collected 01-31-24 @ 18:21)  Source: .Blood None  Preliminary Report (02-02-24 @ 01:03):    No growth at 24 hours    Culture - Urine (collected 01-23-24 @ 05:20)  Source: Clean Catch Clean Catch (Midstream)  Final Report (01-25-24 @ 00:26):    No growth    Culture - Blood (collected 01-22-24 @ 16:51)  Source: .Blood None  Final Report (01-28-24 @ 01:00):    No growth at 5 days    Culture - Urine (collected 01-21-24 @ 05:00)  Source: Clean Catch Clean Catch (Midstream)  Final Report (01-22-24 @ 07:15):    No growth    Culture - Blood (collected 01-20-24 @ 16:15)  Source: .Blood Blood-Peripheral  Gram Stain (01-21-24 @ 20:33):    Growth in aerobic bottle: Gram positive cocci in pairs  Final Report (01-22-24 @ 19:04):    Growth in aerobic bottle: Staphylococcus hominis    Isolation of Coagulase negative Staphylococcus from single blood culture    sets may represent    contamination. Contact the Microbiology Department at [phone] if    susceptibility testing is    clinically indicated.    Direct identification is available within approximately 3-5    hours either by Blood Panel Multiplexed PCR or Direct    MALDI-TOF. Details: [email or web address]  Organism: Blood Culture PCR (01-22-24 @ 19:04)  Organism: Blood Culture PCR (01-22-24 @ 19:04)      Method Type: PCR      -  Coagulase negative Staphylococcus: Detec    Culture - Blood (collected 01-20-24 @ 16:15)  Source: .Blood Blood-Peripheral  Final Report (01-25-24 @ 23:00):    No growth at 5 days        Lactate, Blood: 1.0 mmol/L (02-02-24 @ 04:59)  Lactate, Blood: 1.3 mmol/L (02-01-24 @ 16:00)      INFECTIOUS DISEASES TESTING  Procalcitonin, Serum: 0.22 (02-01-24 @ 16:00)  MRSA PCR Result.: Negative (01-22-24 @ 05:30)  Streptococcus pneumoniae Ag, Ur Result: Negative (01-21-24 @ 05:00)  Legionella Antigen, Urine: Negative (01-21-24 @ 05:00)  Rapid RVP Result: Detected (01-21-24 @ 00:58)  Procalcitonin, Serum: 0.51 (01-20-24 @ 21:23)  Fungitell: 325 (01-20-24 @ 21:23)  Vancomycin Level, Trough: 5.9 (11-12-23 @ 17:55)  Fungitell: 138 (11-07-23 @ 08:08)  Fungitell: 115 (11-02-23 @ 11:40)  Procalcitonin, Serum: 0.04 (11-02-23 @ 11:40)  Procalcitonin, Serum: 0.26 (10-24-23 @ 11:00)  MRSA PCR Result.: Negative (10-17-23 @ 17:20)  Fungitell: >500 (10-17-23 @ 17:20)  Procalcitonin, Serum: 4.36 (10-17-23 @ 17:20)  Procalcitonin, Serum: 4.18 (10-17-23 @ 12:35)  Procalcitonin, Serum: 0.07 (10-15-23 @ 07:28)  COVID-19 PCR: NotDetec (10-12-23 @ 09:14)  Procalcitonin, Serum: 0.40 (10-07-23 @ 10:54)  Streptococcus pneumoniae Ag, Ur Result: Negative (09-30-23 @ 14:36)  Legionella Antigen, Urine: Negative (09-30-23 @ 14:36)  MRSA PCR Result.: Negative (09-29-23 @ 16:50)  Procalcitonin, Serum: 9.78 (08-12-23 @ 11:25)  MRSA PCR Result.: Negative (08-12-23 @ 06:10)  Rapid RVP Result: NotDetec (08-12-23 @ 01:33)  Procalcitonin, Serum: 0.63 (08-10-23 @ 08:46)  Procalcitonin, Serum: 7.82 (08-04-23 @ 16:13)  MRSA PCR Result.: Negative (08-04-23 @ 14:52)  Rapid RVP Result: NotDetec (08-04-23 @ 03:00)  strept    INFLAMMATORY MARKERS  C-Reactive Protein, Serum: 132.3 mg/L (02-01-24 @ 16:00)  Sedimentation Rate, Erythrocyte: 67 mm/Hr (10-15-23 @ 07:28)  C-Reactive Protein, Serum: 16.1 mg/L (10-15-23 @ 07:28)      RADIOLOGY & ADDITIONAL TESTS:  I have personally reviewed the last available Chest xray  CXR  Xray Chest 1 View- PORTABLE-Urgent:   ACC: [id number] EXAM:  XR CHEST PORTABLE URGENT 1V   ORDERED BY: HUBER LANCASTER     PROCEDURE DATE:  01/31/2024          INTERPRETATION:  Clinical History / Reason for exam: Fever    Comparison : Chest radiograph prior day.    Technique/Positioning: Frontal portable, low lung volumes.    Findings:    Support devices: Enteric catheter extends below the hemidiaphragm    Cardiac/mediastinum/hilum: Unremarkable.    Lung parenchyma/Pleura: Basilar opacifications, left greater than right.    Skeleton/soft tissues: Unchanged    Impression:    Basilar opacifications, left greater than right. Support devices as   described.        --- End of Report ---            FAYE JOHNSON MD; Attending Interventional Radiologist  This document has been electronically signed. Jan 31 2024  2:53PM (01-31-24 @ 14:52)      CT      CARDIOLOGY TESTING  12 Lead ECG:   Ventricular Rate 118 BPM    Atrial Rate 118 BPM    P-R Interval 122 ms    QRS Duration 64 ms    Q-T Interval 328 ms    QTC Calculation(Bazett) 459 ms    P Axis 54 degrees    R Axis 93 degrees    T Axis 58 degrees    Diagnosis Line Sinus tachycardia  Rightward axis  Low voltage QRS  Borderline ECG    Confirmed by MARJAN MENDES MD ([id number]) on 2/2/2024 7:15:17 AM (02-01-24 @ 14:30)  12 Lead ECG:   Ventricular Rate 88 BPM    Atrial Rate 88 BPM    P-R Interval 112 ms    QRS Duration 64 ms    Q-T Interval 362 ms    QTC Calculation(Bazett) 438 ms    P Axis -11 degrees    R Axis 95 degrees    T Axis 59 degrees    Diagnosis Line Normal sinus rhythm  Rightward axis  Borderline ECG    Confirmed by caleb roberts ([id number]) on 1/31/2024 2:01:26 PM (01-31-24 @ 11:11)      MEDICATIONS  albuterol    90 MICROgram(s) HFA Inhaler 2 Inhalation two times a day  albuterol/ipratropium for Nebulization 3 Nebulizer every 6 hours  apixaban 5 Oral two times a day  artificial  tears Solution 1 Both EYES two times a day  calcium carbonate   1250 mG (OsCal) 1 Oral two times a day  chlorhexidine 2% Cloths 1 Topical daily  gabapentin 300 Oral every 12 hours  levETIRAcetam  Solution 500 Oral every 12 hours  melatonin 3 Oral at bedtime  meropenem  IVPB 1000 IV Intermittent every 8 hours  metoprolol tartrate 25 Oral every 12 hours  midodrine. 10 Oral every 8 hours  pantoprazole  Injectable 40 IV Push every 24 hours  polyethylene glycol 3350 17 Oral at bedtime  raloxifene 60 Oral daily  senna 2 Oral at bedtime  silver sulfADIAZINE 1% Cream 1 Topical two times a day  sodium chloride 0.9%. 1000 IV Continuous <Continuous>      WEIGHT  Weight (kg): 52.3 (01-21-24 @ 08:00)  Creatinine: 0.7 mg/dL (02-02-24 @ 04:59)      ANTIBIOTICS:  meropenem  IVPB 1000 milliGRAM(s) IV Intermittent every 8 hours      All available historical records have been reviewed       TEA ECHAVARRIA  58y, Female  Allergy: No Known Allergies      LOS  13d    CHIEF COMPLAINT: Respiratory Distress (02 Feb 2024 08:13)      INTERVAL EVENTS/HPI  - febrile this AM , transferred to CEU   procalcitonin unremarkable   - T(F): , Max: 102.7 (02-02-24 @ 07:00)  - Tolerating medication  - WBC Count: 17.56 (02-02-24 @ 04:59)  WBC Count: 15.75 (02-01-24 @ 07:46)     - Creatinine: 0.7 (02-02-24 @ 04:59)  Creatinine: 0.8 (02-01-24 @ 07:46)       ROS  unable to obtain history secondary to patient's mental status and/or sedation     VITALS:  T(F): 102.7, Max: 102.7 (02-02-24 @ 07:00)  HR: 130  BP: 105/56  RR: 20Vital Signs Last 24 Hrs  T(C): 39.3 (02 Feb 2024 07:00), Max: 39.3 (02 Feb 2024 07:00)  T(F): 102.7 (02 Feb 2024 07:00), Max: 102.7 (02 Feb 2024 07:00)  HR: 130 (02 Feb 2024 07:00) (110 - 141)  BP: 105/56 (02 Feb 2024 07:00) (104/53 - 152/73)  BP(mean): 75 (02 Feb 2024 07:00) (73 - 76)  RR: 20 (02 Feb 2024 07:00) (18 - 20)  SpO2: 95% (02 Feb 2024 07:00) (93% - 99%)    Parameters below as of 02 Feb 2024 07:00  Patient On (Oxygen Delivery Method): nasal cannula, high flow        PHYSICAL EXAM:  Gen: chronically ill appearing  face mask, contracted  HEENT: Normocephalic, atraumatic  Neck: supple, no lymphadenopathy  CV: Regular rate & regular rhythm  Lungs: decreased BS at bases, no fremitus  Abdomen: Soft, BS present  Ext: Warm, well perfused  Neuro: non focal, not following commands  Skin: no rash, no erythema  Lines: no phlebitis     FH: Non-contributory  Social Hx: Non-contributory    TESTS & MEASUREMENTS:                        9.9    17.56 )-----------( 540      ( 02 Feb 2024 04:59 )             32.6     02-02    145  |  107  |  18  ----------------------------<  108<H>  4.3   |  26  |  0.7    Ca    8.7      02 Feb 2024 04:59  Mg     2.3     02-02    TPro  7.0  /  Alb  3.2<L>  /  TBili  0.4  /  DBili  x   /  AST  21  /  ALT  22  /  AlkPhos  103  02-02      LIVER FUNCTIONS - ( 02 Feb 2024 04:59 )  Alb: 3.2 g/dL / Pro: 7.0 g/dL / ALK PHOS: 103 U/L / ALT: 22 U/L / AST: 21 U/L / GGT: x           Urinalysis Basic - ( 02 Feb 2024 04:59 )    Color: x / Appearance: x / SG: x / pH: x  Gluc: 108 mg/dL / Ketone: x  / Bili: x / Urobili: x   Blood: x / Protein: x / Nitrite: x   Leuk Esterase: x / RBC: x / WBC x   Sq Epi: x / Non Sq Epi: x / Bacteria: x        Culture - Blood (collected 01-31-24 @ 18:21)  Source: .Blood None  Preliminary Report (02-02-24 @ 01:03):    No growth at 24 hours    Culture - Blood (collected 01-31-24 @ 18:21)  Source: .Blood None  Preliminary Report (02-02-24 @ 01:03):    No growth at 24 hours    Culture - Urine (collected 01-23-24 @ 05:20)  Source: Clean Catch Clean Catch (Midstream)  Final Report (01-25-24 @ 00:26):    No growth    Culture - Blood (collected 01-22-24 @ 16:51)  Source: .Blood None  Final Report (01-28-24 @ 01:00):    No growth at 5 days    Culture - Urine (collected 01-21-24 @ 05:00)  Source: Clean Catch Clean Catch (Midstream)  Final Report (01-22-24 @ 07:15):    No growth    Culture - Blood (collected 01-20-24 @ 16:15)  Source: .Blood Blood-Peripheral  Gram Stain (01-21-24 @ 20:33):    Growth in aerobic bottle: Gram positive cocci in pairs  Final Report (01-22-24 @ 19:04):    Growth in aerobic bottle: Staphylococcus hominis    Isolation of Coagulase negative Staphylococcus from single blood culture    sets may represent    contamination. Contact the Microbiology Department at [phone] if    susceptibility testing is    clinically indicated.    Direct identification is available within approximately 3-5    hours either by Blood Panel Multiplexed PCR or Direct    MALDI-TOF. Details: [email or web address]  Organism: Blood Culture PCR (01-22-24 @ 19:04)  Organism: Blood Culture PCR (01-22-24 @ 19:04)      Method Type: PCR      -  Coagulase negative Staphylococcus: Detec    Culture - Blood (collected 01-20-24 @ 16:15)  Source: .Blood Blood-Peripheral  Final Report (01-25-24 @ 23:00):    No growth at 5 days        Lactate, Blood: 1.0 mmol/L (02-02-24 @ 04:59)  Lactate, Blood: 1.3 mmol/L (02-01-24 @ 16:00)      INFECTIOUS DISEASES TESTING  Procalcitonin, Serum: 0.22 (02-01-24 @ 16:00)  MRSA PCR Result.: Negative (01-22-24 @ 05:30)  Streptococcus pneumoniae Ag, Ur Result: Negative (01-21-24 @ 05:00)  Legionella Antigen, Urine: Negative (01-21-24 @ 05:00)  Rapid RVP Result: Detected (01-21-24 @ 00:58)  Procalcitonin, Serum: 0.51 (01-20-24 @ 21:23)  Fungitell: 325 (01-20-24 @ 21:23)  Vancomycin Level, Trough: 5.9 (11-12-23 @ 17:55)  Fungitell: 138 (11-07-23 @ 08:08)  Fungitell: 115 (11-02-23 @ 11:40)  Procalcitonin, Serum: 0.04 (11-02-23 @ 11:40)  Procalcitonin, Serum: 0.26 (10-24-23 @ 11:00)  MRSA PCR Result.: Negative (10-17-23 @ 17:20)  Fungitell: >500 (10-17-23 @ 17:20)  Procalcitonin, Serum: 4.36 (10-17-23 @ 17:20)  Procalcitonin, Serum: 4.18 (10-17-23 @ 12:35)  Procalcitonin, Serum: 0.07 (10-15-23 @ 07:28)  COVID-19 PCR: NotDetec (10-12-23 @ 09:14)  Procalcitonin, Serum: 0.40 (10-07-23 @ 10:54)  Streptococcus pneumoniae Ag, Ur Result: Negative (09-30-23 @ 14:36)  Legionella Antigen, Urine: Negative (09-30-23 @ 14:36)  MRSA PCR Result.: Negative (09-29-23 @ 16:50)  Procalcitonin, Serum: 9.78 (08-12-23 @ 11:25)  MRSA PCR Result.: Negative (08-12-23 @ 06:10)  Rapid RVP Result: NotDetec (08-12-23 @ 01:33)  Procalcitonin, Serum: 0.63 (08-10-23 @ 08:46)  Procalcitonin, Serum: 7.82 (08-04-23 @ 16:13)  MRSA PCR Result.: Negative (08-04-23 @ 14:52)  Rapid RVP Result: NotDetec (08-04-23 @ 03:00)  strept    INFLAMMATORY MARKERS  C-Reactive Protein, Serum: 132.3 mg/L (02-01-24 @ 16:00)  Sedimentation Rate, Erythrocyte: 67 mm/Hr (10-15-23 @ 07:28)  C-Reactive Protein, Serum: 16.1 mg/L (10-15-23 @ 07:28)      RADIOLOGY & ADDITIONAL TESTS:  I have personally reviewed the last available Chest xray  CXR  Xray Chest 1 View- PORTABLE-Urgent:   ACC: [id number] EXAM:  XR CHEST PORTABLE URGENT 1V   ORDERED BY: HUBER LANCASTER     PROCEDURE DATE:  01/31/2024          INTERPRETATION:  Clinical History / Reason for exam: Fever    Comparison : Chest radiograph prior day.    Technique/Positioning: Frontal portable, low lung volumes.    Findings:    Support devices: Enteric catheter extends below the hemidiaphragm    Cardiac/mediastinum/hilum: Unremarkable.    Lung parenchyma/Pleura: Basilar opacifications, left greater than right.    Skeleton/soft tissues: Unchanged    Impression:    Basilar opacifications, left greater than right. Support devices as   described.        --- End of Report ---            FAYE JOHNSON MD; Attending Interventional Radiologist  This document has been electronically signed. Jan 31 2024  2:53PM (01-31-24 @ 14:52)      CT      CARDIOLOGY TESTING  12 Lead ECG:   Ventricular Rate 118 BPM    Atrial Rate 118 BPM    P-R Interval 122 ms    QRS Duration 64 ms    Q-T Interval 328 ms    QTC Calculation(Bazett) 459 ms    P Axis 54 degrees    R Axis 93 degrees    T Axis 58 degrees    Diagnosis Line Sinus tachycardia  Rightward axis  Low voltage QRS  Borderline ECG    Confirmed by MARJAN MENDES MD ([id number]) on 2/2/2024 7:15:17 AM (02-01-24 @ 14:30)  12 Lead ECG:   Ventricular Rate 88 BPM    Atrial Rate 88 BPM    P-R Interval 112 ms    QRS Duration 64 ms    Q-T Interval 362 ms    QTC Calculation(Bazett) 438 ms    P Axis -11 degrees    R Axis 95 degrees    T Axis 59 degrees    Diagnosis Line Normal sinus rhythm  Rightward axis  Borderline ECG    Confirmed by caleb roberts ([id number]) on 1/31/2024 2:01:26 PM (01-31-24 @ 11:11)      MEDICATIONS  albuterol    90 MICROgram(s) HFA Inhaler 2 Inhalation two times a day  albuterol/ipratropium for Nebulization 3 Nebulizer every 6 hours  apixaban 5 Oral two times a day  artificial  tears Solution 1 Both EYES two times a day  calcium carbonate   1250 mG (OsCal) 1 Oral two times a day  chlorhexidine 2% Cloths 1 Topical daily  gabapentin 300 Oral every 12 hours  levETIRAcetam  Solution 500 Oral every 12 hours  melatonin 3 Oral at bedtime  meropenem  IVPB 1000 IV Intermittent every 8 hours  metoprolol tartrate 25 Oral every 12 hours  midodrine. 10 Oral every 8 hours  pantoprazole  Injectable 40 IV Push every 24 hours  polyethylene glycol 3350 17 Oral at bedtime  raloxifene 60 Oral daily  senna 2 Oral at bedtime  silver sulfADIAZINE 1% Cream 1 Topical two times a day  sodium chloride 0.9%. 1000 IV Continuous <Continuous>      WEIGHT  Weight (kg): 52.3 (01-21-24 @ 08:00)  Creatinine: 0.7 mg/dL (02-02-24 @ 04:59)      ANTIBIOTICS:  meropenem  IVPB 1000 milliGRAM(s) IV Intermittent every 8 hours      All available historical records have been reviewed

## 2024-02-02 NOTE — SWALLOW BEDSIDE ASSESSMENT ADULT - SLP GENERAL OBSERVATIONS
pt received in bed awake +generalized weakness in no apparent pain. +HFNC 60L/min, 82 % O2, +NRB mask +pt mouth breathing +O2 saturation 88% pt received in bed awake +generalized weakness in no apparent pain. +HFNC 60L/min, 82% O2, +NRB mask +pt mouth breathing +O2 saturation 88%

## 2024-02-02 NOTE — CHART NOTE - NSCHARTNOTEFT_GEN_A_CORE
I was called and informed that patient hypotensive. 66/41, .  Patient was febrile 100.8. In septic shock.  She was upgraded to CEU 2 days ago for pneumonia and increased oxygen requirements.  Currently on HFNC, saturating above 95%.  On exam, patient was somnolent (not at her baseline), but arousable and able to protect her airways. 2 L LR boluses were given, BP did not .  Patient was started yesterday on Meropenem and Vancomycin was added today. Blood cultures negative so far.   Repeat CXR showed worsening in bilateral infiltrates.   Will start Levophed and hydrocortisone stress dose if she did not respond.   Consult ICU for possible upgrade.

## 2024-02-02 NOTE — PROGRESS NOTE ADULT - ASSESSMENT
IMPRESSION:    Acute hypoxemic respiratory failure   Likely aspiration pneumonia   Sepsis POA  Septic shock resolved  Recurrent aspiration pneumonia/ prior intubation  HO GI bleed  HO OM  HO recent duodenal perforation   HO polymicrobial bacteremia   H/o CP  H/o seizures    PLAN:    CNS:  Avoid CNS Depressant, AED. MS at baseline.     HEENT: Oral care.  ET care     PULMONARY: HOB at 45 degrees.  Aspiration precaution. On HFNC. Keep spo2 more than 92%. Repeat xray today    CARDIOVASCULAR: Keep MAP more than 65mmhg. Avoid overload. On Midodrine TID.     GI: Protonix. Feeding per speech. May need NGT.     RENAL:   FU lytes.  Correct as needed.  No need for barlow.     INFECTIOUS DISEASE:  Check blood, sputum culture. Procal. Nasal MRSA. Abx per ID    HEMATOLOGICAL: On Eliquis.     ENDOCRINE:  Follow up FS.  Insulin protocol if needed.    MUSCULOSKELETAL: Bedrest.  Off loading.  Wound care.      Prognosis very poor.     SDU for now.

## 2024-02-03 LAB
ALBUMIN SERPL ELPH-MCNC: 2.7 G/DL — LOW (ref 3.5–5.2)
ALP SERPL-CCNC: 100 U/L — SIGNIFICANT CHANGE UP (ref 30–115)
ALT FLD-CCNC: 17 U/L — SIGNIFICANT CHANGE UP (ref 0–41)
ANION GAP SERPL CALC-SCNC: 12 MMOL/L — SIGNIFICANT CHANGE UP (ref 7–14)
AST SERPL-CCNC: 19 U/L — SIGNIFICANT CHANGE UP (ref 0–41)
BASOPHILS # BLD AUTO: 0.03 K/UL — SIGNIFICANT CHANGE UP (ref 0–0.2)
BASOPHILS NFR BLD AUTO: 0.1 % — SIGNIFICANT CHANGE UP (ref 0–1)
BILIRUB SERPL-MCNC: 0.3 MG/DL — SIGNIFICANT CHANGE UP (ref 0.2–1.2)
BUN SERPL-MCNC: 18 MG/DL — SIGNIFICANT CHANGE UP (ref 10–20)
CALCIUM SERPL-MCNC: 8.6 MG/DL — SIGNIFICANT CHANGE UP (ref 8.4–10.4)
CHLORIDE SERPL-SCNC: 109 MMOL/L — SIGNIFICANT CHANGE UP (ref 98–110)
CO2 SERPL-SCNC: 26 MMOL/L — SIGNIFICANT CHANGE UP (ref 17–32)
CREAT SERPL-MCNC: 0.5 MG/DL — LOW (ref 0.7–1.5)
CRP SERPL-MCNC: 214.2 MG/L — HIGH
CULTURE RESULTS: SIGNIFICANT CHANGE UP
EGFR: 109 ML/MIN/1.73M2 — SIGNIFICANT CHANGE UP
EOSINOPHIL # BLD AUTO: 0 K/UL — SIGNIFICANT CHANGE UP (ref 0–0.7)
EOSINOPHIL NFR BLD AUTO: 0 % — SIGNIFICANT CHANGE UP (ref 0–8)
ERYTHROCYTE [SEDIMENTATION RATE] IN BLOOD: 100 MM/HR — HIGH (ref 0–20)
GLUCOSE SERPL-MCNC: 172 MG/DL — HIGH (ref 70–99)
HCT VFR BLD CALC: 28.6 % — LOW (ref 37–47)
HGB BLD-MCNC: 8.5 G/DL — LOW (ref 12–16)
IMM GRANULOCYTES NFR BLD AUTO: 0.8 % — HIGH (ref 0.1–0.3)
LACTATE SERPL-SCNC: <0.2 MMOL/L — LOW (ref 0.7–2)
LYMPHOCYTES # BLD AUTO: 1.48 K/UL — SIGNIFICANT CHANGE UP (ref 1.2–3.4)
LYMPHOCYTES # BLD AUTO: 6.9 % — LOW (ref 20.5–51.1)
MAGNESIUM SERPL-MCNC: 1.9 MG/DL — SIGNIFICANT CHANGE UP (ref 1.8–2.4)
MCHC RBC-ENTMCNC: 24.6 PG — LOW (ref 27–31)
MCHC RBC-ENTMCNC: 29.7 G/DL — LOW (ref 32–37)
MCV RBC AUTO: 82.9 FL — SIGNIFICANT CHANGE UP (ref 81–99)
MONOCYTES # BLD AUTO: 0.39 K/UL — SIGNIFICANT CHANGE UP (ref 0.1–0.6)
MONOCYTES NFR BLD AUTO: 1.8 % — SIGNIFICANT CHANGE UP (ref 1.7–9.3)
MRSA PCR RESULT.: NEGATIVE — SIGNIFICANT CHANGE UP
NEUTROPHILS # BLD AUTO: 19.26 K/UL — HIGH (ref 1.4–6.5)
NEUTROPHILS NFR BLD AUTO: 90.4 % — HIGH (ref 42.2–75.2)
NRBC # BLD: 0 /100 WBCS — SIGNIFICANT CHANGE UP (ref 0–0)
PLATELET # BLD AUTO: 438 K/UL — HIGH (ref 130–400)
PMV BLD: 10.9 FL — HIGH (ref 7.4–10.4)
POTASSIUM SERPL-MCNC: 3.7 MMOL/L — SIGNIFICANT CHANGE UP (ref 3.5–5)
POTASSIUM SERPL-SCNC: 3.7 MMOL/L — SIGNIFICANT CHANGE UP (ref 3.5–5)
PROT SERPL-MCNC: 5.9 G/DL — LOW (ref 6–8)
RBC # BLD: 3.45 M/UL — LOW (ref 4.2–5.4)
RBC # FLD: 19.8 % — HIGH (ref 11.5–14.5)
SODIUM SERPL-SCNC: 147 MMOL/L — HIGH (ref 135–146)
SPECIMEN SOURCE: SIGNIFICANT CHANGE UP
WBC # BLD: 21.32 K/UL — HIGH (ref 4.8–10.8)
WBC # FLD AUTO: 21.32 K/UL — HIGH (ref 4.8–10.8)

## 2024-02-03 PROCEDURE — 99233 SBSQ HOSP IP/OBS HIGH 50: CPT

## 2024-02-03 PROCEDURE — 71045 X-RAY EXAM CHEST 1 VIEW: CPT | Mod: 26

## 2024-02-03 RX ORDER — MIDODRINE HYDROCHLORIDE 2.5 MG/1
20 TABLET ORAL THREE TIMES A DAY
Refills: 0 | Status: DISCONTINUED | OUTPATIENT
Start: 2024-02-03 | End: 2024-02-06

## 2024-02-03 RX ORDER — SCOPALAMINE 1 MG/3D
1 PATCH, EXTENDED RELEASE TRANSDERMAL
Refills: 0 | Status: DISCONTINUED | OUTPATIENT
Start: 2024-02-03 | End: 2024-02-14

## 2024-02-03 RX ORDER — METOPROLOL TARTRATE 50 MG
25 TABLET ORAL
Refills: 0 | Status: DISCONTINUED | OUTPATIENT
Start: 2024-02-04 | End: 2024-02-06

## 2024-02-03 RX ADMIN — Medication 1 APPLICATION(S): at 17:35

## 2024-02-03 RX ADMIN — Medication 3 MILLILITER(S): at 07:54

## 2024-02-03 RX ADMIN — Medication 3 MILLILITER(S): at 13:16

## 2024-02-03 RX ADMIN — RALOXIFENE HYDROCHLORIDE 60 MILLIGRAM(S): 60 TABLET, COATED ORAL at 11:31

## 2024-02-03 RX ADMIN — Medication 4.9 MICROGRAM(S)/KG/MIN: at 10:44

## 2024-02-03 RX ADMIN — Medication 25 MILLIGRAM(S): at 17:30

## 2024-02-03 RX ADMIN — MIDODRINE HYDROCHLORIDE 20 MILLIGRAM(S): 2.5 TABLET ORAL at 17:32

## 2024-02-03 RX ADMIN — Medication 1 DROP(S): at 05:38

## 2024-02-03 RX ADMIN — POLYETHYLENE GLYCOL 3350 17 GRAM(S): 17 POWDER, FOR SOLUTION ORAL at 21:17

## 2024-02-03 RX ADMIN — SENNA PLUS 2 TABLET(S): 8.6 TABLET ORAL at 21:17

## 2024-02-03 RX ADMIN — GABAPENTIN 300 MILLIGRAM(S): 400 CAPSULE ORAL at 17:29

## 2024-02-03 RX ADMIN — Medication 1 TABLET(S): at 05:38

## 2024-02-03 RX ADMIN — GABAPENTIN 300 MILLIGRAM(S): 400 CAPSULE ORAL at 05:39

## 2024-02-03 RX ADMIN — Medication 1 APPLICATION(S): at 05:38

## 2024-02-03 RX ADMIN — Medication 1 DROP(S): at 17:33

## 2024-02-03 RX ADMIN — PANTOPRAZOLE SODIUM 40 MILLIGRAM(S): 20 TABLET, DELAYED RELEASE ORAL at 05:36

## 2024-02-03 RX ADMIN — LEVETIRACETAM 500 MILLIGRAM(S): 250 TABLET, FILM COATED ORAL at 17:29

## 2024-02-03 RX ADMIN — MIDODRINE HYDROCHLORIDE 10 MILLIGRAM(S): 2.5 TABLET ORAL at 05:39

## 2024-02-03 RX ADMIN — MEROPENEM 100 MILLIGRAM(S): 1 INJECTION INTRAVENOUS at 21:21

## 2024-02-03 RX ADMIN — Medication 1 TABLET(S): at 18:56

## 2024-02-03 RX ADMIN — CHLORHEXIDINE GLUCONATE 1 APPLICATION(S): 213 SOLUTION TOPICAL at 11:31

## 2024-02-03 RX ADMIN — SCOPALAMINE 1 PATCH: 1 PATCH, EXTENDED RELEASE TRANSDERMAL at 17:28

## 2024-02-03 RX ADMIN — Medication 100 MILLIGRAM(S): at 05:39

## 2024-02-03 RX ADMIN — APIXABAN 5 MILLIGRAM(S): 2.5 TABLET, FILM COATED ORAL at 17:29

## 2024-02-03 RX ADMIN — Medication 100 MILLIGRAM(S): at 17:34

## 2024-02-03 RX ADMIN — LEVETIRACETAM 500 MILLIGRAM(S): 250 TABLET, FILM COATED ORAL at 05:39

## 2024-02-03 RX ADMIN — MEROPENEM 100 MILLIGRAM(S): 1 INJECTION INTRAVENOUS at 13:04

## 2024-02-03 RX ADMIN — APIXABAN 5 MILLIGRAM(S): 2.5 TABLET, FILM COATED ORAL at 05:38

## 2024-02-03 RX ADMIN — MEROPENEM 100 MILLIGRAM(S): 1 INJECTION INTRAVENOUS at 05:39

## 2024-02-03 NOTE — PROGRESS NOTE ADULT - SUBJECTIVE AND OBJECTIVE BOX
Patient is a 58y old  Female who presents with a chief complaint of Respiratory Distress (02 Feb 2024 16:49)        Over Night Events:    Increased O2 requirements  On HFNC  60/80  HYyotensive now on levo 0.06  febrile     ROS:  See HPI    PHYSICAL EXAM    ICU Vital Signs Last 24 Hrs  T(C): 36.7 (03 Feb 2024 00:15), Max: 38.6 (02 Feb 2024 16:06)  T(F): 98.1 (03 Feb 2024 00:15), Max: 101.4 (02 Feb 2024 16:06)  HR: 75 (03 Feb 2024 03:00) (71 - 127)  BP: 93/52 (03 Feb 2024 00:15) (93/52 - 110/63)  BP(mean): --  ABP: --  ABP(mean): --  RR: 20 (03 Feb 2024 03:00) (18 - 127)  SpO2: 99% (03 Feb 2024 03:00) (92% - 99%)    O2 Parameters below as of 03 Feb 2024 03:00  Patient On (Oxygen Delivery Method): nasal cannula, high flow  O2 Flow (L/min): 60  O2 Concentration (%): 80        02-02-24 @ 07:01  -  02-03-24 @ 07:00  --------------------------------------------------------  IN:    Nepro with Carb Steady: 270 mL  Total IN: 270 mL    OUT:  Total OUT: 0 mL    Total NET: 270 mL            CONSTITUTIONAL:  ill appearing    ENT:   Airway patent,   No thrush    EYES:   Clear bilaterally,   pupils equal,   round and reactive to light.    CARDIAC:   Normal rate,   regular rhythm.    no edema      CAROTID:   normal systolic impulse  no bruits    RESPIRATORY:   bilateral rhonchi  Normal chest expansion  Not tachypneic,  No use of accessory muscles    GASTROINTESTINAL:  Abdomen soft,   non-tender,   no guarding,   + BS    MUSCULOSKELETAL:   range of motion is not limited,  no clubbing, cyanosis    NEUROLOGICAL:   Nonverbal   no motor deficits.        LABS:                            9.9    17.56 )-----------( 540      ( 02 Feb 2024 04:59 )             32.6                                               02-02    145  |  107  |  18  ----------------------------<  108<H>  4.3   |  26  |  0.7    Ca    8.7      02 Feb 2024 04:59  Mg     2.3     02-02    TPro  7.0  /  Alb  3.2<L>  /  TBili  0.4  /  DBili  x   /  AST  21  /  ALT  22  /  AlkPhos  103  02-02                                             Urinalysis Basic - ( 02 Feb 2024 04:59 )    Color: x / Appearance: x / SG: x / pH: x  Gluc: 108 mg/dL / Ketone: x  / Bili: x / Urobili: x   Blood: x / Protein: x / Nitrite: x   Leuk Esterase: x / RBC: x / WBC x   Sq Epi: x / Non Sq Epi: x / Bacteria: x                                                  LIVER FUNCTIONS - ( 02 Feb 2024 04:59 )  Alb: 3.2 g/dL / Pro: 7.0 g/dL / ALK PHOS: 103 U/L / ALT: 22 U/L / AST: 21 U/L / GGT: x                    Procalcitonin, Serum: 0.22 ng/mL (02-01-24 @ 16:00)  C-Reactive Protein, Serum: 132.3 mg/L (02-01-24 @ 16:00)  Ferritin: 128 ng/mL (01-21-24 @ 06:34)  Procalcitonin, Serum: 0.51 ng/mL (01-20-24 @ 21:23)                                        Culture - Blood (collected 01 Feb 2024 11:58)  Source: .Blood None  Preliminary Report (02 Feb 2024 23:02):    No growth at 24 hours    Culture - Urine (collected 01 Feb 2024 11:40)  Source: Clean Catch Clean Catch (Midstream)  Final Report (03 Feb 2024 00:06):    >=3 organisms. Probable collection contamination.    Culture - Blood (collected 31 Jan 2024 18:21)  Source: .Blood None  Preliminary Report (03 Feb 2024 01:02):    No growth at 48 Hours    Culture - Blood (collected 31 Jan 2024 18:21)  Source: .Blood None  Preliminary Report (03 Feb 2024 01:02):    No growth at 48 Hours                                                                                       ABG - ( 02 Feb 2024 22:11 )  pH, Arterial: 7.52  pH, Blood: x     /  pCO2: 30    /  pO2: 161   / HCO3: 24    / Base Excess: 2.4   /  SaO2: 99.5                MEDICATIONS  (STANDING):  albuterol    90 MICROgram(s) HFA Inhaler 2 Puff(s) Inhalation two times a day  albuterol/ipratropium for Nebulization 3 milliLiter(s) Nebulizer every 6 hours  apixaban 5 milliGRAM(s) Oral two times a day  artificial  tears Solution 1 Drop(s) Both EYES two times a day  calcium carbonate   1250 mG (OsCal) 1 Tablet(s) Oral two times a day  chlorhexidine 2% Cloths 1 Application(s) Topical daily  gabapentin 300 milliGRAM(s) Oral every 12 hours  levETIRAcetam  Solution 500 milliGRAM(s) Oral every 12 hours  melatonin 3 milliGRAM(s) Oral at bedtime  meropenem  IVPB 1000 milliGRAM(s) IV Intermittent every 8 hours  metoprolol tartrate 25 milliGRAM(s) Oral every 12 hours  midodrine. 10 milliGRAM(s) Oral every 8 hours  norepinephrine Infusion 0.05 MICROgram(s)/kG/Min (4.9 mL/Hr) IV Continuous <Continuous>  pantoprazole  Injectable 40 milliGRAM(s) IV Push every 24 hours  polyethylene glycol 3350 17 Gram(s) Oral at bedtime  raloxifene 60 milliGRAM(s) Oral daily  senna 2 Tablet(s) Oral at bedtime  silver sulfADIAZINE 1% Cream 1 Application(s) Topical two times a day  sodium chloride 0.9%. 1000 milliLiter(s) (100 mL/Hr) IV Continuous <Continuous>  vancomycin  IVPB      vancomycin  IVPB 500 milliGRAM(s) IV Intermittent every 12 hours    MEDICATIONS  (PRN):  acetaminophen     Tablet .. 650 milliGRAM(s) Oral every 6 hours PRN Temp greater or equal to 38C (100.4F), Mild Pain (1 - 3)  aluminum hydroxide/magnesium hydroxide/simethicone Suspension 30 milliLiter(s) Oral every 4 hours PRN Dyspepsia  ondansetron Injectable 4 milliGRAM(s) IV Push every 8 hours PRN Nausea and/or Vomiting  sodium chloride 0.65% Nasal 1 Spray(s) Both Nostrils daily PRN Nasal Congestion      Xrays:   new bibasilar infilatrates                                                                                  ECHO

## 2024-02-03 NOTE — CHART NOTE - NSCHARTNOTEFT_GEN_A_CORE
Patient Aid at bedside provided phone number for  Ms. Carson [phone]. Called with no answer, unable to leave voicemail.    Called emergency contact in chart Ms. Ana Massey [phone] who stated she helped take care of the patient for several decades although does not care for her now. She provided a phone number for the patient's living facility [phone] (similar to Ms. Carson's number, unsure if number from aid at bedside was incorrect), left a message with callback number

## 2024-02-03 NOTE — PROGRESS NOTE ADULT - SUBJECTIVE AND OBJECTIVE BOX
58F w/ PMHx Down Syndrome, nonverbal at baseline, Hypothyroidism, Cerebral palsy and Seizure Disorders presents to the ED from nursing facility/group home ([facility]) presented to ED for respiratory distress and high grade fever. Patient found to be septic on admission.Admitted to SDU for management of acute respiratory distress 2/2 CAP/Aspiration PNA (20 Jan 2024 18:22)  While pt was on the floor she developed dyspnea after swallow evaluation, was spiking high grade fever, consulted by pulmonary/critical care and was upgraded back to SDU.  Today pt is lethargic, NAD, nonverbal, aid at the bedside.       PAST MEDICAL & SURGICAL HISTORY:  Down syndrome  Osteoporosis  Mild anemia  Neuropathy  S/P debridement of R hip on 3/2/21    Allergies  No Known Allergies    Vital Signs Last 24 Hrs  T(C): 36.9 (03 Feb 2024 08:00), Max: 38.6 (02 Feb 2024 16:06)  T(F): 98.5 (03 Feb 2024 08:00), Max: 101.4 (02 Feb 2024 16:06)  HR: 95 (03 Feb 2024 08:20) (63 - 127)  BP: 133/62 (03 Feb 2024 08:00) (93/52 - 133/62)  BP(mean): 89 (03 Feb 2024 08:00) (89 - 89)  RR: 18 (03 Feb 2024 08:00) (18 - 127)  SpO2: 97% (03 Feb 2024 08:20) (92% - 99%)    Parameters below as of 03 Feb 2024 08:20  Patient On (Oxygen Delivery Method): nasal cannula, high flow  O2 Flow (L/min): 60  O2 Concentration (%): 90    PHYSICAL EXAM:   GENERAL: in mild distress, anxious with eyes open, nonverbal   HEENT: atraumatic, EOMI.  Lungs:   coarse breath sounds  CVS: SIS2 +, tachycardia, no murmur.  Abdomen/ GI:  Soft,  NT, ND, BS+  CNS: awake , non verbal, anxious   Ext: contracted  Skin: no rash, no ulcers.    LABs:                           8.5    21.32 )-----------( 438      ( 03 Feb 2024 06:25 )             28.6   02-03    147<H>  |  109  |  18  ----------------------------<  172<H>  3.7   |  26  |  0.5<L>    Ca    8.6      03 Feb 2024 06:25  Mg     1.9     02-03    TPro  5.9<L>  /  Alb  2.7<L>  /  TBili  0.3  /  DBili  x   /  AST  19  /  ALT  17  /  AlkPhos  100  02-03      Urinalysis Basic - ( 01 Feb 2024 11:40 )    Color: Yellow / Appearance: Turbid / SG: >1.030 / pH: x  Gluc: x / Ketone: Negative mg/dL  / Bili: Negative / Urobili: 1.0 mg/dL   Blood: x / Protein: 100 mg/dL / Nitrite: Negative   Leuk Esterase: Negative / RBC: 10 /HPF / WBC 5 /HPF   Sq Epi: x / Non Sq Epi: 2 /HPF / Bacteria: Negative /HPF    Culture - Blood (01.31.24 @ 18:21)   Specimen Source: .Blood None  Culture Results:   No growth at 24 hoursCulture - Urine (01.23.24 @ 05:20)   Specimen Source: Clean Catch Clean Catch (Midstream)  Culture Results:   No growthCulture - Blood (01.20.24 @ 16:15)   - Coagulase negative Staphylococcus: Detec  Gram Stain:   Growth in aerobic bottle: Gram positive cocci in pairs  Specimen Source: .Blood Blood-Peripheral  Organism: Blood Culture PCR  Culture Results:   Growth in aerobic bottle: Staphylococcus hominis   Isolation of Coagulase negative Staphylococcus from single blood culture     sets may represent   contamination. Contact the Microbiology Department at [phone] if   susceptibility testing is   clinically indicated.   Direct identification is available within approximately 3-5   hours either by Blood Panel Multiplexed PCR or Direct   MALDI-TOF. Details: [email or web address]  Organism Identification: Blood Culture PCR  Method Type: PCR    RADIOLOGY:   < from: Xray Chest 1 View- PORTABLE-Routine (Xray Chest 1 View- PORTABLE-Routine .) (02.02.24 @ 09:22) >  Impression:    Retrocardiac opacification, worsened.    < end of copied text >  < from: [facility] Duplex Lower Ext Vein Scan, Bilat (01.22.24 @ 14:52) >  IMPRESSION:  No evidence of deep venous thrombosis in either lower extremity.    < end of copied text >  < from: TTE Echo Complete w/ Contrast w/o Doppler (01.22.24 @ 13:35) >  Summary:   1. Technically difficult and limited study.   2. Endocardial visualization was enhanced with intravenous echo contrast.   3. Left ventricular ejection fraction, by visual estimation, is >55%.   4. Normal global left ventricular systolic function.   5. The left ventricular diastolic function could not be assessed in this   study.      MEDICATIONS  (STANDING):  albuterol    90 MICROgram(s) HFA Inhaler 2 Puff(s) Inhalation two times a day  albuterol/ipratropium for Nebulization 3 milliLiter(s) Nebulizer every 6 hours  apixaban 5 milliGRAM(s) Oral two times a day  artificial  tears Solution 1 Drop(s) Both EYES two times a day  calcium carbonate   1250 mG (OsCal) 1 Tablet(s) Oral two times a day  chlorhexidine 2% Cloths 1 Application(s) Topical daily  gabapentin 300 milliGRAM(s) Oral every 12 hours  levETIRAcetam  Solution 500 milliGRAM(s) Oral every 12 hours  melatonin 3 milliGRAM(s) Oral at bedtime  meropenem  IVPB 1000 milliGRAM(s) IV Intermittent every 8 hours  metoprolol tartrate 25 milliGRAM(s) Oral every 12 hours  midodrine. 20 milliGRAM(s) Oral three times a day  norepinephrine Infusion 0.05 MICROgram(s)/kG/Min (4.9 mL/Hr) IV Continuous <Continuous>  pantoprazole  Injectable 40 milliGRAM(s) IV Push every 24 hours  polyethylene glycol 3350 17 Gram(s) Oral at bedtime  raloxifene 60 milliGRAM(s) Oral daily  scopolamine 1 mG/72 Hr(s) Patch 1 Patch Transdermal every 72 hours  senna 2 Tablet(s) Oral at bedtime  silver sulfADIAZINE 1% Cream 1 Application(s) Topical two times a day  vancomycin  IVPB      vancomycin  IVPB 500 milliGRAM(s) IV Intermittent every 12 hours    MEDICATIONS  (PRN):  acetaminophen     Tablet .. 650 milliGRAM(s) Oral every 6 hours PRN Temp greater or equal to 38C (100.4F), Mild Pain (1 - 3)  aluminum hydroxide/magnesium hydroxide/simethicone Suspension 30 milliLiter(s) Oral every 4 hours PRN Dyspepsia  ondansetron Injectable 4 milliGRAM(s) IV Push every 8 hours PRN Nausea and/or Vomiting  sodium chloride 0.65% Nasal 1 Spray(s) Both Nostrils daily PRN Nasal Congestion

## 2024-02-03 NOTE — PROGRESS NOTE ADULT - ASSESSMENT
IMPRESSION:    Acute hypoxemic respiratory failure   Likely aspiration pneumonia   Sepsis POA  Septic shock    Recurrent aspiration pneumonia/ prior intubation  HO GI bleed  HO OM  HO recent duodenal perforation   HO polymicrobial bacteremia   H/o CP  H/o seizures    PLAN:    CNS:  Avoid CNS Depressant, AED. MS at baseline.     HEENT: Oral care.  ET care     PULMONARY: HOB at 45 degrees.  Aspiration precaution. On HFNC. Keep spo2 more than 92%. Repeat xray today reviewed, new b/l infiltrates    CARDIOVASCULAR: Keep MAP more than 65mmhg. Avoid overload. increase Midodrine to 20 TID.     GI: Protonix. NPO.      INFECTIOUS DISEASE:  Check blood, sputum culture. Procal. Nasal MRSA. Abx per ID    HEMATOLOGICAL: On Eliquis.     ENDOCRINE:  Follow up FS.  Insulin protocol if needed.    MUSCULOSKELETAL: Bedrest.  Off loading.  Wound care.      Prognosis very poor.     Palliative care eval    SDU for now.  IMPRESSION:    Acute hypoxemic respiratory failure   Likely aspiration pneumonia   Sepsis POA  Septic shock    Recurrent aspiration pneumonia/ prior intubation  HO GI bleed  HO OM  HO recent duodenal perforation   HO polymicrobial bacteremia   H/o CP  H/o seizures    PLAN:    CNS:  Avoid CNS Depressant, AED. MS at baseline.     HEENT: Oral care.  ET care     PULMONARY: HOB at 45 degrees.  Aspiration precaution. On HFNC. Keep spo2 more than 92%. Repeat xray today reviewed, new b/l infiltrates. ABG this afternoon.     CARDIOVASCULAR: Keep MAP more than 65mmhg. Avoid overload. Increase Midodrine to 15 TID. DC VIF. Goal directed fluid resusc.     GI: Protonix. NGT for feeds. High risk of aspiration.     INFECTIOUS DISEASE:  Check blood, sputum culture. Procal. Nasal MRSA. On Meropenem and Vancomycin. Vanc trough level. ID following.     HEMATOLOGICAL: On Eliquis.     ENDOCRINE:  Follow up FS.  Insulin protocol if needed.    MUSCULOSKELETAL: Bedrest.  Off loading.  Wound care.      Prognosis very poor.     Palliative care eval.     SDU for now.

## 2024-02-03 NOTE — PROGRESS NOTE ADULT - SUBJECTIVE AND OBJECTIVE BOX
Interval History  Patient is a 58y old Female who presents with a chief complaint of Respiratory Distress (03 Feb 2024 07:53)    TEA ECHAVARRIA is admitted with a primary diagnosis of Sepsis with acute hypoxic respiratory failure      Today is hospital day 14d. The patient was examined at the bedside this morning. No acute overnight events were reported.    Admission HPI  HPI:  58F w/ PMHx Down Syndrome, nonverbal at baseline, Hypothyroidism, Cerebral palsy and Seizure Disorders presents to the ED from nursing facility/group home ([facility]) presented to ED for respiratory distress and high grade fever. Patient found to be septic on admission. As per aide Janette at bedside, patient had been coughing and congested for 3x days. Denies fevers, chills, n/v/d or pain prior to admission. Patient is on a puree diet at baseline.    Vitals: Temp 104.5F (rectal), /74, , RR 24, SpO2 100% on BiPAP    Labs: Hgb 9.5 (previously 11.1), Platelet 422, INR 1.43, VBG Lactate 2.1    Imaging: CXR shows possible RML infiltrate    In the ED:  - s/p Cefepime 2g IV x1  - s/p Levaquin 750mg IV x1  - s/p 2L LR bolus    Admitted to SDU for management of acute respiratory distress 2/2 CAP/Aspiration PNA (20 Jan 2024 18:22)      Past Medical and Surgical History  Down syndrome    Osteoporosis    Mild anemia    Neuropathy    S/P debridement  of R hip on 3/2/21      Family History  FAMILY HISTORY:    Social History  Social History:      Allergies  No Known Allergies    Medications  Standing Medications  albuterol    90 MICROgram(s) HFA Inhaler 2 Puff(s) Inhalation two times a day  albuterol/ipratropium for Nebulization 3 milliLiter(s) Nebulizer every 6 hours  apixaban 5 milliGRAM(s) Oral two times a day  artificial  tears Solution 1 Drop(s) Both EYES two times a day  calcium carbonate   1250 mG (OsCal) 1 Tablet(s) Oral two times a day  chlorhexidine 2% Cloths 1 Application(s) Topical daily  gabapentin 300 milliGRAM(s) Oral every 12 hours  levETIRAcetam  Solution 500 milliGRAM(s) Oral every 12 hours  melatonin 3 milliGRAM(s) Oral at bedtime  meropenem  IVPB 1000 milliGRAM(s) IV Intermittent every 8 hours  metoprolol tartrate 25 milliGRAM(s) Oral every 12 hours  midodrine. 20 milliGRAM(s) Oral three times a day  norepinephrine Infusion 0.05 MICROgram(s)/kG/Min IV Continuous <Continuous>  pantoprazole  Injectable 40 milliGRAM(s) IV Push every 24 hours  polyethylene glycol 3350 17 Gram(s) Oral at bedtime  raloxifene 60 milliGRAM(s) Oral daily  senna 2 Tablet(s) Oral at bedtime  silver sulfADIAZINE 1% Cream 1 Application(s) Topical two times a day  sodium chloride 0.9%. 1000 milliLiter(s) IV Continuous <Continuous>  vancomycin  IVPB      vancomycin  IVPB 500 milliGRAM(s) IV Intermittent every 12 hours    PRN Medications  acetaminophen     Tablet .. 650 milliGRAM(s) Oral every 6 hours PRN  aluminum hydroxide/magnesium hydroxide/simethicone Suspension 30 milliLiter(s) Oral every 4 hours PRN  ondansetron Injectable 4 milliGRAM(s) IV Push every 8 hours PRN  sodium chloride 0.65% Nasal 1 Spray(s) Both Nostrils daily PRN    Vitals  T(F): 98.5  HR: 63  BP: 133/62  RR: 18  SpO2: 98%    I&O's    02-02-24 @ 07:01  -  02-03-24 @ 07:00  --------------------------------------------------------  IN: 270 mL / OUT: 0 mL / NET: 270 mL        Physical Examination  General: Lethargic, nonverbal, Down Syndrome  Head: NCAT, poor dentition  Cardiac: Regular rate and rhythm  Pulmonary: Significant mucous turbulence b/l  Abdominal: Soft, nontender, and nondistended  Extremities: No pitting edema  Neurologic: Nonverbal, does not follow commands    Labs                        8.5    21.32 )-----------( 438      ( 03 Feb 2024 06:25 )             28.6     02-03    147<H>  |  109  |  18  ----------------------------<  172<H>  3.7   |  26  |  0.5<L>    Ca    8.6      03 Feb 2024 06:25  Mg     1.9     02-03    TPro  5.9<L>  /  Alb  2.7<L>  /  TBili  0.3  /  DBili  x   /  AST  19  /  ALT  17  /  AlkPhos  100  02-03      Urinalysis Basic - ( 03 Feb 2024 06:25 )    Color: x / Appearance: x / SG: x / pH: x  Gluc: 172 mg/dL / Ketone: x  / Bili: x / Urobili: x   Blood: x / Protein: x / Nitrite: x   Leuk Esterase: x / RBC: x / WBC x   Sq Epi: x / Non Sq Epi: x / Bacteria: x      CAPILLARY BLOOD GLUCOSE        ABG - ( 02 Feb 2024 22:11 )  pH, Arterial: 7.52  pH, Blood: x     /  pCO2: 30    /  pO2: 161   / HCO3: 24    / Base Excess: 2.4   /  SaO2: 99.5                Imaging  CXR  Xray Chest 1 View- PORTABLE-Urgent:   ACC: [id number] EXAM:  XR CHEST PORTABLE URGENT 1V   ORDERED BY: SEVERINO PETERS     PROCEDURE DATE:  02/02/2024          INTERPRETATION:  Clinical History / Reason for exam: Line placement    Comparison : Chest radiograph February 2, 2024 at 8:39 AM.    Technique/Positioning: Single AP view of the chest.    Findings:    Support devices: Feeding tube in stomach    Cardiac/mediastinum/hilum: Unremarkable.    Lung parenchyma/Pleura: Developing bibasilar opacities/atelectasis. No   pneumothorax.    Skeleton/soft tissues: Unchanged    Impression:    Developing bibasilar opacities/atelectasis.        --- End of Report ---            ELLIOT LANDAU MD; Attending Radiologist  This document has been electronically signed. Feb 2 2024  1:55PM (02-02-24 @ 13:53)  Xray Chest 1 View- PORTABLE-Urgent:   ACC: [id number] EXAM:  XR CHEST PORTABLE URGENT 1V   ORDERED BY: HUBER LANCASTER     PROCEDURE DATE:  01/31/2024          INTERPRETATION:  Clinical History / Reason for exam: Fever    Comparison : Chest radiograph prior day.    Technique/Positioning: Frontal portable, low lung volumes.    Findings:    Support devices: Enteric catheter extends below the hemidiaphragm    Cardiac/mediastinum/hilum: Unremarkable.    Lung parenchyma/Pleura: Basilar opacifications, left greater than right.    Skeleton/soft tissues: Unchanged    Impression:    Basilar opacifications, left greater than right. Support devices as   described.        --- End of Report ---            FAYE JOHNSON MD; Attending Interventional Radiologist  This document has been electronically signed. Jan 31 2024  2:53PM (01-31-24 @ 14:52)      CT

## 2024-02-03 NOTE — CHART NOTE - NSCHARTNOTEFT_GEN_A_CORE
Palliative Care chart note    Palliative care consult received electronically. Chart reviewed. Will plan to see patient for full consult on Monday.    Please call [phone] PRN for any questions, needs, or concerns prior to that time.

## 2024-02-03 NOTE — PROGRESS NOTE ADULT - ASSESSMENT
58F w/ PMHx Down Syndrome, nonverbal at baseline, Hypothyroidism, Cerebral palsy and Seizure Disorders presents to the ED from nursing facility/group home ([facility]) presented to ED for respiratory distress and high grade fever. Patient found to be septic on admission.Admitted to SDU for management of acute respiratory distress 2/2 CAP/Aspiration PNA (20 Jan 2024 18:22)  While pt was on the floor she developed dyspnea after swallow evaluation, was spiking high grade fever, consulted by pulmonary/critical care and was upgraded back to SDU.    A/P  # Sepsis POA / Acute hypoxic resp failure / RSV bronchiolitis /  H/o Dysphagia/ suspected aspiration  - pt is spiking high grade fever on broad spectrum ABx, procalcitonin is low, doubt bacterial infection, f/u  full respiratory panel   - MRSA negative   - BCx (1/20): Staph hominis -  contaminant repeat BCx have been NGTD  - Failed FEES, put  NG tube for feedings and medications, pt'll likely need PEG   - consult GI   - Aspiration precautions  - c/w duoneb  - pt was consulted by ID, c/w  Meropenem and Vancomycin   - pulmonary is following     # H/o hypotension  - d/c fluids ( pt developed worsening congestion), repeat CXR  - c/w midodrine, keep MAP above 60 , increase Midodrine dose to 15 mg Q 8 hours   - taper  off pressure support      # Elevated Troponin , type 2 MI/  Sinus tachycardia  - c/w telemetry monitoring   - Repeat EKG: Normal sinus rhythm  - c/w  metoprolol  -  maintain fluid balance     # Seizure Disorder  - c/w  Keppra   - seizure precautions  - keep Mg above 2.0     # H/o lower ext DVT  - c/w Eliquis    # Hypothyroidism  - Thyroid Stimulating Hormone, Serum: 0.51 uIU/mL (01.21.24 @ 06:34)  - check FT4     # Normocytic Anemia   - stable    # Down Syndrome/  Cerebral Palsy  - supportive care  - prevent falls and aspiration   - failed speech and swallow, needs PEG   - on Raloxifene     # Full code    # GI prophylaxis       Pending (specify):  pulmonary toilet, chest PT Q 4 hours, frequent suction, Scopolamine patch to reduce secretions,  increase Midodrine to 15 mg Q 8 hours, taper off from pressure support, keep MAP above 60,  d/c IV fluids, f/u repeat Na level in AM, check RVP, monitor WBC and fever curve, consult GI for PEG, contact family or group home for consent.

## 2024-02-03 NOTE — PROGRESS NOTE ADULT - ASSESSMENT
58F w/ PMHx Down Syndrome, nonverbal at baseline, DVT on eliquis 5mg BID,  Hypothyroidism, aspiration pneumonia in prev admissions, Cerebral palsy and Seizure Disorders presents to the ED from nursing facility/group home ([facility]) presented to ED for respiratory distress and high grade fever. Patient found to be septic on admission. Admitted for management of acute respiratory distress likely 2/2 CAP or aspiration PNA.    #Sepsis POA (Febrile, Tachycardic, Leukocytosis)  #Acute Hypoxic Respiratory Failure secondary to Aspiration PNA / RSV  #Hx of Dysphagia - Puree Diet at baseline  - On admission: VBG Lactate 2.1 improved to wnl, procal 0.04 pH wnl and CO2 mildly elevated 61  - MRSA negative   - BCx (1/20): Staph hominis repeat BCx have been NGTD  - UCx: Neg  - RVP: RSV detected  - MRSA swab neg, urine strep neg, urine legionella neg  - D-dimer 605 and LE duplex neg  - CXR pending Bibasal opacities without significant change.  - Fungitell chronically positive, received Caspofungin on previous admission  - C/w NS @100cc/hr for now  - Repeat Flu/Covid/MRSA nares per ID  - C/w Meropenem 1g IV q8hr  - C/w Vancomycin 500mg IV q12 per ID, f/u repeat MRSA nares  - C/w Tube Feeding by NGT, will need PEG eval when respiratory status improves  -  Mrs. Carson [phone] to be called as patient will likely need PEG consent  - Currently on HFNC 60L 90% FiO2, continue to monitor saturation if increased WOB and desat might need intubation  - Febrile and hypotensive overnight 2/2-2/3, on low-dose levophed, f/u repeat Bcx    #Hypotension in setting of Septic Shock  - Increase midodrine to 20mg TID  - Monitor BP as patient is spiking fevers  - Keep MAP >65    #Elevated Troponin (Stabilized)  #Sinus Tachycardia  - Troponins: 25>37>35>35  - EKG shows sinus tachycardia, repeated  - Could likely be from hypovolemia/infectious state  - C/w Lopressor 25mg BID  - C/w NS @100cc/hr for now    #Acute on Chronic Normocytic Anemia (Stable)  #Iron Deficiency  - On admission: HgB 8.9 (previously 9.5)  - No evidence of hemorrhage  - B12 and Folate WNL normal  - Total Iron 15, Iron Saturation 6%, Ferritin 128 reflecting potential TIFFANI  - Hold off on iron supplementation in setting of suspected infection  - Keep HgB >7    #Hx of DVT of L Common Femoral Vein  - LE Duplex negative for DVT  - C/w Eliquis 5mg BID    #Seizure Disorder  - C/w Keppra 500mg BID     #Hx of Right Foot OM (1st Toe Stump and 2nd Distal Phalanx)  #Hx of Multiple Left Foot DTIs  #Hx of Sacral Wound  - Previous wound cultures positive for Proteus and ESBL E coli  - Patient underwent excisional debridement of ulcer of right foot (including 1st metatarsal) and partial amputation 2nd toe on previous admissions  - No acute surgical interventions from podiatry and burn  - C/w q2hr repositioning  - C/w local wound care    #Down Syndrome  #Cerebral Palsy  #Non-Verbal  #Post-Menopausal Bone Loss  - C/w Gabapentin 300mg BID  - C/w Raloxifene 60mg QD    #Misc  #Code Status: Full Code  #DVT ppx: Eliquis   #GI prophylaxis: Protonix  #Diet: Tube Feeds  #Disposition: SDU 58F w/ PMHx Down Syndrome, nonverbal at baseline, DVT on eliquis 5mg BID,  Hypothyroidism, aspiration pneumonia in prev admissions, Cerebral palsy and Seizure Disorders presents to the ED from nursing facility/group home ([facility]) presented to ED for respiratory distress and high grade fever. Patient found to be septic on admission. Admitted for management of acute respiratory distress likely 2/2 CAP or aspiration PNA.    #Sepsis POA (Febrile, Tachycardic, Leukocytosis)  #Acute Hypoxic Respiratory Failure secondary to Aspiration PNA / RSV  #Hx of Dysphagia - Puree Diet at baseline  - On admission: VBG Lactate 2.1 improved to wnl, procal 0.04 pH wnl and CO2 mildly elevated 61  - MRSA negative   - BCx (1/20): Staph hominis repeat BCx have been NGTD  - UCx: Neg  - RVP: RSV detected  - MRSA swab neg, urine strep neg, urine legionella neg  - D-dimer 605 and LE duplex neg  - CXR pending Bibasal opacities without significant change.  - Fungitell chronically positive, received Caspofungin on previous admission  - C/w NS @100cc/hr for now  - Repeat Flu/Covid/MRSA nares per ID  - C/w Meropenem 1g IV q8hr  - C/w Vancomycin 500mg IV q12 per ID, f/u repeat MRSA nares  - C/w Tube Feeding by NGT, will need PEG eval when respiratory status improves  -  Mrs. Carson [phone] to be called as patient will likely need PEG consent  - F/u Palliative care  - Currently on HFNC 60L 90% FiO2, continue to monitor saturation if increased WOB and desat might need intubation  - Febrile and hypotensive overnight 2/2-2/3, on low-dose levophed, f/u repeat Bcx    #Hypotension in setting of Septic Shock  - Increase midodrine to 20mg TID  - Monitor BP as patient is spiking fevers  - Keep MAP >65    #Elevated Troponin (Stabilized)  #Sinus Tachycardia  - Troponins: 25>37>35>35  - EKG shows sinus tachycardia, repeated  - Could likely be from hypovolemia/infectious state  - C/w Lopressor 25mg BID  - C/w NS @100cc/hr for now    #Acute on Chronic Normocytic Anemia (Stable)  #Iron Deficiency  - On admission: HgB 8.9 (previously 9.5)  - No evidence of hemorrhage  - B12 and Folate WNL normal  - Total Iron 15, Iron Saturation 6%, Ferritin 128 reflecting potential TIFFANI  - Hold off on iron supplementation in setting of suspected infection  - Keep HgB >7    #Hx of DVT of L Common Femoral Vein  - LE Duplex negative for DVT  - C/w Eliquis 5mg BID    #Seizure Disorder  - C/w Keppra 500mg BID     #Hx of Right Foot OM (1st Toe Stump and 2nd Distal Phalanx)  #Hx of Multiple Left Foot DTIs  #Hx of Sacral Wound  - Previous wound cultures positive for Proteus and ESBL E coli  - Patient underwent excisional debridement of ulcer of right foot (including 1st metatarsal) and partial amputation 2nd toe on previous admissions  - No acute surgical interventions from podiatry and burn  - C/w q2hr repositioning  - C/w local wound care    #Down Syndrome  #Cerebral Palsy  #Non-Verbal  #Post-Menopausal Bone Loss  - C/w Gabapentin 300mg BID  - C/w Raloxifene 60mg QD    #Misc  #Code Status: Full Code  #DVT ppx: Eliquis   #GI prophylaxis: Protonix  #Diet: Tube Feeds  #Disposition: SDU

## 2024-02-03 NOTE — PROGRESS NOTE ADULT - ATTENDING COMMENTS
IMPRESSION:    Acute hypoxemic respiratory failure   Likely aspiration pneumonia   Sepsis POA  Septic shock    Recurrent aspiration pneumonia/ prior intubation  HO GI bleed  HO OM  HO recent duodenal perforation   HO polymicrobial bacteremia   H/o CP  H/o seizures    Impression and plan above have been altered and are my own.

## 2024-02-04 LAB
ALBUMIN SERPL ELPH-MCNC: 2.7 G/DL — LOW (ref 3.5–5.2)
ALP SERPL-CCNC: 94 U/L — SIGNIFICANT CHANGE UP (ref 30–115)
ALT FLD-CCNC: 18 U/L — SIGNIFICANT CHANGE UP (ref 0–41)
ANION GAP SERPL CALC-SCNC: 10 MMOL/L — SIGNIFICANT CHANGE UP (ref 7–14)
AST SERPL-CCNC: 16 U/L — SIGNIFICANT CHANGE UP (ref 0–41)
BASOPHILS # BLD AUTO: 0.04 K/UL — SIGNIFICANT CHANGE UP (ref 0–0.2)
BASOPHILS NFR BLD AUTO: 0.2 % — SIGNIFICANT CHANGE UP (ref 0–1)
BILIRUB SERPL-MCNC: 0.3 MG/DL — SIGNIFICANT CHANGE UP (ref 0.2–1.2)
BUN SERPL-MCNC: 20 MG/DL — SIGNIFICANT CHANGE UP (ref 10–20)
CALCIUM SERPL-MCNC: 8.5 MG/DL — SIGNIFICANT CHANGE UP (ref 8.4–10.5)
CHLORIDE SERPL-SCNC: 104 MMOL/L — SIGNIFICANT CHANGE UP (ref 98–110)
CO2 SERPL-SCNC: 31 MMOL/L — SIGNIFICANT CHANGE UP (ref 17–32)
CREAT SERPL-MCNC: 0.5 MG/DL — LOW (ref 0.7–1.5)
EGFR: 109 ML/MIN/1.73M2 — SIGNIFICANT CHANGE UP
EOSINOPHIL # BLD AUTO: 0.01 K/UL — SIGNIFICANT CHANGE UP (ref 0–0.7)
EOSINOPHIL NFR BLD AUTO: 0.1 % — SIGNIFICANT CHANGE UP (ref 0–8)
FERRITIN SERPL-MCNC: 281 NG/ML — SIGNIFICANT CHANGE UP (ref 13–330)
GLUCOSE SERPL-MCNC: 159 MG/DL — HIGH (ref 70–99)
HCT VFR BLD CALC: 32.5 % — LOW (ref 37–47)
HGB BLD-MCNC: 9.8 G/DL — LOW (ref 12–16)
IMM GRANULOCYTES NFR BLD AUTO: 0.6 % — HIGH (ref 0.1–0.3)
LYMPHOCYTES # BLD AUTO: 0.9 K/UL — LOW (ref 1.2–3.4)
LYMPHOCYTES # BLD AUTO: 5.2 % — LOW (ref 20.5–51.1)
MAGNESIUM SERPL-MCNC: 2 MG/DL — SIGNIFICANT CHANGE UP (ref 1.8–2.4)
MCHC RBC-ENTMCNC: 24.1 PG — LOW (ref 27–31)
MCHC RBC-ENTMCNC: 30.2 G/DL — LOW (ref 32–37)
MCV RBC AUTO: 79.9 FL — LOW (ref 81–99)
MONOCYTES # BLD AUTO: 0.74 K/UL — HIGH (ref 0.1–0.6)
MONOCYTES NFR BLD AUTO: 4.2 % — SIGNIFICANT CHANGE UP (ref 1.7–9.3)
NEUTROPHILS # BLD AUTO: 15.66 K/UL — HIGH (ref 1.4–6.5)
NEUTROPHILS NFR BLD AUTO: 89.7 % — HIGH (ref 42.2–75.2)
NRBC # BLD: 0 /100 WBCS — SIGNIFICANT CHANGE UP (ref 0–0)
PLATELET # BLD AUTO: 478 K/UL — HIGH (ref 130–400)
PMV BLD: 10.8 FL — HIGH (ref 7.4–10.4)
POTASSIUM SERPL-MCNC: 3.4 MMOL/L — LOW (ref 3.5–5)
POTASSIUM SERPL-SCNC: 3.4 MMOL/L — LOW (ref 3.5–5)
PROCALCITONIN SERPL-MCNC: 0.15 NG/ML — HIGH (ref 0.02–0.1)
PROT SERPL-MCNC: 6 G/DL — SIGNIFICANT CHANGE UP (ref 6–8)
RAPID RVP RESULT: SIGNIFICANT CHANGE UP
RBC # BLD: 4.07 M/UL — LOW (ref 4.2–5.4)
RBC # FLD: 19.8 % — HIGH (ref 11.5–14.5)
SARS-COV-2 RNA SPEC QL NAA+PROBE: SIGNIFICANT CHANGE UP
SODIUM SERPL-SCNC: 145 MMOL/L — SIGNIFICANT CHANGE UP (ref 135–146)
WBC # BLD: 17.45 K/UL — HIGH (ref 4.8–10.8)
WBC # FLD AUTO: 17.45 K/UL — HIGH (ref 4.8–10.8)

## 2024-02-04 PROCEDURE — 71045 X-RAY EXAM CHEST 1 VIEW: CPT | Mod: 26

## 2024-02-04 PROCEDURE — 99233 SBSQ HOSP IP/OBS HIGH 50: CPT

## 2024-02-04 RX ORDER — POTASSIUM CHLORIDE 20 MEQ
20 PACKET (EA) ORAL ONCE
Refills: 0 | Status: COMPLETED | OUTPATIENT
Start: 2024-02-04 | End: 2024-02-04

## 2024-02-04 RX ORDER — SCOPALAMINE 1 MG/3D
1 PATCH, EXTENDED RELEASE TRANSDERMAL
Refills: 0 | Status: DISCONTINUED | OUTPATIENT
Start: 2024-02-04 | End: 2024-02-04

## 2024-02-04 RX ORDER — ACETAMINOPHEN 500 MG
1000 TABLET ORAL ONCE
Refills: 0 | Status: COMPLETED | OUTPATIENT
Start: 2024-02-04 | End: 2024-02-04

## 2024-02-04 RX ORDER — FUROSEMIDE 40 MG
20 TABLET ORAL ONCE
Refills: 0 | Status: COMPLETED | OUTPATIENT
Start: 2024-02-04 | End: 2024-02-04

## 2024-02-04 RX ORDER — ACETAMINOPHEN 500 MG
650 TABLET ORAL ONCE
Refills: 0 | Status: COMPLETED | OUTPATIENT
Start: 2024-02-04 | End: 2024-02-12

## 2024-02-04 RX ORDER — IPRATROPIUM/ALBUTEROL SULFATE 18-103MCG
3 AEROSOL WITH ADAPTER (GRAM) INHALATION
Refills: 0 | Status: DISCONTINUED | OUTPATIENT
Start: 2024-02-04 | End: 2024-02-07

## 2024-02-04 RX ORDER — ALBUTEROL 90 UG/1
2 AEROSOL, METERED ORAL
Refills: 0 | Status: DISCONTINUED | OUTPATIENT
Start: 2024-02-04 | End: 2024-02-07

## 2024-02-04 RX ADMIN — MEROPENEM 100 MILLIGRAM(S): 1 INJECTION INTRAVENOUS at 14:25

## 2024-02-04 RX ADMIN — APIXABAN 5 MILLIGRAM(S): 2.5 TABLET, FILM COATED ORAL at 17:23

## 2024-02-04 RX ADMIN — Medication 4.9 MICROGRAM(S)/KG/MIN: at 06:02

## 2024-02-04 RX ADMIN — Medication 3 MILLILITER(S): at 14:43

## 2024-02-04 RX ADMIN — MEROPENEM 100 MILLIGRAM(S): 1 INJECTION INTRAVENOUS at 06:06

## 2024-02-04 RX ADMIN — RALOXIFENE HYDROCHLORIDE 60 MILLIGRAM(S): 60 TABLET, COATED ORAL at 11:11

## 2024-02-04 RX ADMIN — SENNA PLUS 2 TABLET(S): 8.6 TABLET ORAL at 21:17

## 2024-02-04 RX ADMIN — Medication 50 MILLIEQUIVALENT(S): at 10:25

## 2024-02-04 RX ADMIN — GABAPENTIN 300 MILLIGRAM(S): 400 CAPSULE ORAL at 17:22

## 2024-02-04 RX ADMIN — POLYETHYLENE GLYCOL 3350 17 GRAM(S): 17 POWDER, FOR SOLUTION ORAL at 21:17

## 2024-02-04 RX ADMIN — Medication 4.9 MICROGRAM(S)/KG/MIN: at 10:34

## 2024-02-04 RX ADMIN — APIXABAN 5 MILLIGRAM(S): 2.5 TABLET, FILM COATED ORAL at 06:07

## 2024-02-04 RX ADMIN — CHLORHEXIDINE GLUCONATE 1 APPLICATION(S): 213 SOLUTION TOPICAL at 11:10

## 2024-02-04 RX ADMIN — Medication 1 DROP(S): at 17:22

## 2024-02-04 RX ADMIN — Medication 20 MILLIGRAM(S): at 10:25

## 2024-02-04 RX ADMIN — MIDODRINE HYDROCHLORIDE 20 MILLIGRAM(S): 2.5 TABLET ORAL at 11:10

## 2024-02-04 RX ADMIN — Medication 1 TABLET(S): at 18:19

## 2024-02-04 RX ADMIN — PANTOPRAZOLE SODIUM 40 MILLIGRAM(S): 20 TABLET, DELAYED RELEASE ORAL at 06:07

## 2024-02-04 RX ADMIN — Medication 1 DROP(S): at 06:10

## 2024-02-04 RX ADMIN — LEVETIRACETAM 500 MILLIGRAM(S): 250 TABLET, FILM COATED ORAL at 06:09

## 2024-02-04 RX ADMIN — MIDODRINE HYDROCHLORIDE 20 MILLIGRAM(S): 2.5 TABLET ORAL at 17:21

## 2024-02-04 RX ADMIN — Medication 100 MILLIGRAM(S): at 17:25

## 2024-02-04 RX ADMIN — Medication 25 MILLIGRAM(S): at 17:24

## 2024-02-04 RX ADMIN — Medication 1 TABLET(S): at 06:08

## 2024-02-04 RX ADMIN — Medication 3 MILLILITER(S): at 08:15

## 2024-02-04 RX ADMIN — Medication 400 MILLIGRAM(S): at 15:05

## 2024-02-04 RX ADMIN — MIDODRINE HYDROCHLORIDE 20 MILLIGRAM(S): 2.5 TABLET ORAL at 06:08

## 2024-02-04 RX ADMIN — Medication 650 MILLIGRAM(S): at 18:53

## 2024-02-04 RX ADMIN — Medication 1 APPLICATION(S): at 06:09

## 2024-02-04 RX ADMIN — MEROPENEM 100 MILLIGRAM(S): 1 INJECTION INTRAVENOUS at 21:19

## 2024-02-04 RX ADMIN — GABAPENTIN 300 MILLIGRAM(S): 400 CAPSULE ORAL at 06:08

## 2024-02-04 RX ADMIN — LEVETIRACETAM 500 MILLIGRAM(S): 250 TABLET, FILM COATED ORAL at 17:23

## 2024-02-04 RX ADMIN — Medication 100 MILLIGRAM(S): at 06:07

## 2024-02-04 RX ADMIN — Medication 1 APPLICATION(S): at 17:21

## 2024-02-04 RX ADMIN — Medication 25 MILLIGRAM(S): at 06:07

## 2024-02-04 NOTE — PROGRESS NOTE ADULT - ASSESSMENT
58F w/ PMHx Down Syndrome, nonverbal at baseline, Hypothyroidism, Cerebral palsy and Seizure Disorders presents to the ED from nursing facility/group home ([facility]) presented to ED for respiratory distress and high grade fever. Patient found to be septic on admission.Admitted to SDU for management of acute respiratory distress 2/2 CAP/Aspiration PNA (20 Jan 2024 18:22)  While pt was on the floor she developed dyspnea after swallow evaluation, was spiking high grade fever, consulted by pulmonary/critical care and was upgraded back to SDU.    A/P  # Sepsis POA / Acute hypoxic resp failure / RSV bronchiolitis /  H/o Dysphagia/ suspected aspiration  - pt is spiking high grade fever on broad spectrum ABx, procalcitonin is low, f/u  full respiratory panel   - inflammatory markers elevated   - MRSA negative   - BCx (1/20): Staph hominis -  contaminant repeat BCx have been NGTD  - Failed FEES, put  NG tube for feedings and medications, pt'll likely need PEG, consulted by GI   - Aspiration precautions, order chest PT vest   - c/w Scopolamine patch  - supplement oxygen, monitor pulse Ox ,repeat ABG   - c/w duoneb  - pt was consulted by ID, c/w  Meropenem and Vancomycin, contact clinical pharmacist for Vanco dose    - pulmonary is following, recommendations noted;  - On HFNC; alternate with BIPAP. Keep spo2 more than 92%. Repeat xray with left consolidation/atelectasis.    # H/o hypotension  - c/w midodrine, keep MAP above 60 , increase Midodrine dose to 15 mg Q 8 hours   - taper  off pressure support  ast tolerated     # Elevated Troponin , type 2 MI/  Sinus tachycardia  - c/w telemetry monitoring   - Repeat EKG: Normal sinus rhythm  - c/w  metoprolol  -  maintain fluid balance     # Seizure Disorder  - c/w  Keppra   - seizure precautions  - keep Mg above 2.0     # H/o lower ext DVT  - c/w Eliquis    # Hypothyroidism  - Thyroid Stimulating Hormone, Serum: 0.51 uIU/mL (01.21.24 @ 06:34)  - check FT4     # Normocytic Anemia   - stable    # Down Syndrome/  Cerebral Palsy  - supportive care  - prevent falls and aspiration   - failed speech and swallow, needs PEG   - on Raloxifene     # Full code    # GI prophylaxis       Pending (specify):  pulmonary toilet, chest PT vest,  frequent suction, Scopolamine patch , On HFNC; alternate with BIPAP. Keep spo2 more than 92%,   increase Midodrine to 15 mg Q 8 hours, taper off from pressure support, keep MAP above 60,  check RVP, monitor WBC and fever curve, contact  clinical pharmacist for Vanco dose, supportive care, hold off with transfer to ICU ( discussed with ICU/pulmonary attending)

## 2024-02-04 NOTE — CONSULT NOTE ADULT - ASSESSMENT
58F w/ PMHx Down Syndrome, nonverbal at baseline, Hypothyroidism, Cerebral palsy and Seizure Disorders presents to the ED from nursing facility/group home ([facility]) presented to ED for respiratory distress and high grade fever. Patient found to be septic on admission.Admitted to SDU for management of acute respiratory distress 2/2 CAP/Aspiration PNA (20 Jan 2024 18:22)  While pt was on the floor she developed dyspnea after swallow evaluation, was spiking high grade fever, upgraded to SDU. Patient was being managed for acute hypoxic respiratory failure required requiring oxygen supplement Patient failed speech and swallow evaluation.  SLP recommended n.p.o. with plan oral means of nutrition.  GI consulted to consider  PEG placement.    # Oropharyngeal dysphagia    Appreciate speech and swallow recommendation.  N.p.o. with nonoral means of nutrition.  Sepsis, and respiratory failure requiring high flow nasal cannula    Plan  NG tube feeding for now  Will consider PEG placement after resolution of the acute medical issues   Please recall GI when patient is more stable.

## 2024-02-04 NOTE — PROGRESS NOTE ADULT - ATTENDING COMMENTS
IMPRESSION:    Acute hypoxemic respiratory failure on HHFNC and NRB   Likely aspiration pneumonia   Sepsis POA  Septic shock  on Levo   Recurrent aspiration pneumonia/ prior intubation  HO GI bleed  HO OM  HO recent duodenal perforation   HO polymicrobial bacteremia   H/o CP  H/o seizures    Impression and plan above have been altered and are my own.

## 2024-02-04 NOTE — PROGRESS NOTE ADULT - SUBJECTIVE AND OBJECTIVE BOX
58F w/ PMHx Down Syndrome, nonverbal at baseline, Hypothyroidism, Cerebral palsy and Seizure Disorders presents to the ED from nursing facility/group home ([facility]) presented to ED for respiratory distress and high grade fever. Patient found to be septic on admission.Admitted to SDU for management of acute respiratory distress 2/2 CAP/Aspiration PNA (20 Jan 2024 18:22)  While pt was on the floor she developed dyspnea after swallow evaluation, was spiking high grade fever, consulted by pulmonary/critical care and was upgraded back to SDU.  Today pt is awake, nonverbal, looks anxious, aid at the bedside.       PAST MEDICAL & SURGICAL HISTORY:  Down syndrome  Osteoporosis  Mild anemia  Neuropathy  S/P debridement of R hip on 3/2/21    Allergies  No Known Allergies    Vital Signs Last 24 Hrs  T(C): 38.7 (04 Feb 2024 12:00), Max: 38.7 (04 Feb 2024 12:00)  T(F): 101.7 (04 Feb 2024 12:00), Max: 101.7 (04 Feb 2024 12:00)  HR: 122 (04 Feb 2024 12:00) (73 - 150)  BP: 107/59 (04 Feb 2024 12:00) (93/54 - 118/62)  BP(mean): 76 (04 Feb 2024 12:00) (8 - 76)  RR: 20 (04 Feb 2024 12:00) (18 - 20)  SpO2: 93% (04 Feb 2024 12:00) (88% - 100%)    Parameters below as of 04 Feb 2024 08:47  Patient On (Oxygen Delivery Method): nasal cannula, high flow  O2 Flow (L/min): 60  O2 Concentration (%): 100    PHYSICAL EXAM:   GENERAL: in mild distress, anxious with eyes open, nonverbal   HEENT: atraumatic, EOMI.  Lungs:   coarse breath sounds  CVS: SIS2 +, tachycardia, no murmur.  Abdomen/ GI:  Soft,  NT, ND, BS+  CNS: awake , non verbal, anxious   Ext: contracted  Skin: no rash, no ulcers.    LABs:                           9.8    17.45 )-----------( 478      ( 04 Feb 2024 06:46 )             32.5   02-04    145  |  104  |  20  ----------------------------<  159<H>  3.4<L>   |  31  |  0.5<L>    Ca    8.5      04 Feb 2024 06:46  Mg     2.0     02-04    TPro  6.0  /  Alb  2.7<L>  /  TBili  0.3  /  DBili  x   /  AST  16  /  ALT  18  /  AlkPhos  94  02-04      Parameters below as of 04 Feb 2024 08:47  Patient On (Oxygen Delivery Method): nasal cannula, high flow  O2 Flow (L/min): 60  O2 Concentration (%): 100  Urinalysis Basic - ( 01 Feb 2024 11:40 )    Color: Yellow / Appearance: Turbid / SG: >1.030 / pH: x  Gluc: x / Ketone: Negative mg/dL  / Bili: Negative / Urobili: 1.0 mg/dL   Blood: x / Protein: 100 mg/dL / Nitrite: Negative   Leuk Esterase: Negative / RBC: 10 /HPF / WBC 5 /HPF   Sq Epi: x / Non Sq Epi: 2 /HPF / Bacteria: Negative /HPF    Culture - Blood (01.31.24 @ 18:21)   Specimen Source: .Blood None  Culture Results:   No growth at 24 hoursCulture - Urine (01.23.24 @ 05:20)   Specimen Source: Clean Catch Clean Catch (Midstream)  Culture Results:   No growthCulture - Blood (01.20.24 @ 16:15)   - Coagulase negative Staphylococcus: Detec  Gram Stain:   Growth in aerobic bottle: Gram positive cocci in pairs  Specimen Source: .Blood Blood-Peripheral  Organism: Blood Culture PCR  Culture Results:   Growth in aerobic bottle: Staphylococcus hominis   Isolation of Coagulase negative Staphylococcus from single blood culture     sets may represent   contamination. Contact the Microbiology Department at [phone] if   susceptibility testing is   clinically indicated.   Direct identification is available within approximately 3-5   hours either by Blood Panel Multiplexed PCR or Direct   MALDI-TOF. Details: [email or web address]  Organism Identification: Blood Culture PCR  Method Type: PCR        RADIOLOGY:   < from: Xray Chest 1 View- PORTABLE-Routine (Xray Chest 1 View- PORTABLE-Routine .) (02.02.24 @ 09:22) >  Impression:    Retrocardiac opacification, worsened.    < end of copied text >  < from: VA Duplex Lower Ext Vein Scan, Bilat (01.22.24 @ 14:52) >  IMPRESSION:  No evidence of deep venous thrombosis in either lower extremity.    < end of copied text >  < from: TTE Echo Complete w/ Contrast w/o Doppler (01.22.24 @ 13:35) >  Summary:   1. Technically difficult and limited study.   2. Endocardial visualization was enhanced with intravenous echo contrast.   3. Left ventricular ejection fraction, by visual estimation, is >55%.   4. Normal global left ventricular systolic function.   5. The left ventricular diastolic function could not be assessed in this   study.  < from: Xray Chest 1 View- PORTABLE-Routine (Xray Chest 1 View- PORTABLE-Routine in AM.) (02.03.24 @ 06:48) >    Impression:    Stable bilateral opacities    < end of copied text >      MEDICATIONS  (STANDING):  albuterol    90 MICROgram(s) HFA Inhaler 2 Puff(s) Inhalation two times a day  albuterol/ipratropium for Nebulization 3 milliLiter(s) Nebulizer four times a day  apixaban 5 milliGRAM(s) Oral two times a day  artificial  tears Solution 1 Drop(s) Both EYES two times a day  calcium carbonate   1250 mG (OsCal) 1 Tablet(s) Oral two times a day  chlorhexidine 2% Cloths 1 Application(s) Topical daily  gabapentin 300 milliGRAM(s) Oral every 12 hours  levETIRAcetam  Solution 500 milliGRAM(s) Oral every 12 hours  melatonin 3 milliGRAM(s) Oral at bedtime  meropenem  IVPB 1000 milliGRAM(s) IV Intermittent every 8 hours  metoprolol tartrate 25 milliGRAM(s) Oral two times a day  midodrine. 20 milliGRAM(s) Oral three times a day  norepinephrine Infusion 0.05 MICROgram(s)/kG/Min (4.9 mL/Hr) IV Continuous <Continuous>  pantoprazole  Injectable 40 milliGRAM(s) IV Push every 24 hours  polyethylene glycol 3350 17 Gram(s) Oral at bedtime  raloxifene 60 milliGRAM(s) Oral daily  scopolamine 1 mG/72 Hr(s) Patch 1 Patch Transdermal every 72 hours  senna 2 Tablet(s) Oral at bedtime  silver sulfADIAZINE 1% Cream 1 Application(s) Topical two times a day  vancomycin  IVPB      vancomycin  IVPB 500 milliGRAM(s) IV Intermittent every 12 hours    MEDICATIONS  (PRN):  acetaminophen     Tablet .. 650 milliGRAM(s) Oral once PRN Temp greater or equal to 38.5C (101.3F)  acetaminophen     Tablet .. 650 milliGRAM(s) Oral every 6 hours PRN Temp greater or equal to 38C (100.4F), Mild Pain (1 - 3)  aluminum hydroxide/magnesium hydroxide/simethicone Suspension 30 milliLiter(s) Oral every 4 hours PRN Dyspepsia  ondansetron Injectable 4 milliGRAM(s) IV Push every 8 hours PRN Nausea and/or Vomiting  sodium chloride 0.65% Nasal 1 Spray(s) Both Nostrils daily PRN Nasal Congestion

## 2024-02-04 NOTE — PROGRESS NOTE ADULT - ASSESSMENT
IMPRESSION:    Acute hypoxemic respiratory failure on HHFNC and NRB   Likely aspiration pneumonia   Sepsis POA  Septic shock  on Levo   Recurrent aspiration pneumonia/ prior intubation  HO GI bleed  HO OM  HO recent duodenal perforation   HO polymicrobial bacteremia   H/o CP  H/o seizures    PLAN:    CNS:  Avoid CNS Depressant, AED. MS at baseline.     HEENT: Oral care.  ET care     PULMONARY: HOB at 45 degrees.  Aspiration precaution. On HFNC. Keep spo2 more than 92%. Repeat xray today reviewed, new b/l infiltrates. ABG  noted, low threshold for intubation     CARDIOVASCULAR: Keep MAP more than 65mmhg. Avoid overload. Wean levophed     GI: Protonix. NGT for feeds. High risk of aspiration.     INFECTIOUS DISEASE:  Check blood, sputum culture. Procal. Nasal MRSA.  Abx per ID     HEMATOLOGICAL: change to Lovenox     ENDOCRINE:  Follow up FS.  Insulin protocol if needed.    MUSCULOSKELETAL: Bedrest.  Off loading.  Wound care.      Prognosis very poor.     MICU    IMPRESSION:    Acute hypoxemic respiratory failure on HHFNC and NRB   Likely aspiration pneumonia   Sepsis POA  Septic shock  on Levo   Recurrent aspiration pneumonia/ prior intubation  HO GI bleed  HO OM  HO recent duodenal perforation   HO polymicrobial bacteremia   H/o CP  H/o seizures    PLAN:    CNS:  Avoid CNS Depressant, AED. MS at baseline.     HEENT: Oral care.  ET care     PULMONARY: HOB at 45 degrees.  Aspiration precaution. On HFNC; alternate with BIPAP. Keep spo2 more than 92%. Repeat xray with left consolidation. ABG  today, low threshold for intubation.     CARDIOVASCULAR: Keep MAP more than 65mmhg. Avoid overload. Wean levophed as able. Check Bedside IVC.     GI: Protonix. NGT for feeds; hold fornow. High risk of aspiration.     INFECTIOUS DISEASE:  Check blood, sputum culture. Procal. Nasal MRSA.  Abx per ID: Vanc and Meropenm. Vanc trough level.     HEMATOLOGICAL: change to Lovenox therapeutic.      ENDOCRINE:  Follow up FS.  Insulin protocol if needed.    MUSCULOSKELETAL: Bedrest.  Off loading.  Wound care.      Prognosis very poor.     Upgrade to ICU.    IMPRESSION:    Acute hypoxemic respiratory failure on HHFNC and NRB   Likely aspiration pneumonia   Sepsis POA  Septic shock  on Levo   Recurrent aspiration pneumonia/ prior intubation  HO GI bleed  HO OM  HO recent duodenal perforation   HO polymicrobial bacteremia   H/o CP  H/o seizures    PLAN:    CNS:  Avoid CNS Depressant, AED. MS at baseline.     HEENT: Oral care.  ET care     PULMONARY: HOB at 45 degrees.  Aspiration precaution. On HFNC; alternate with BIPAP. Keep spo2 more than 92%. Repeat xray with left consolidation/atelectasis. ABG  today.    CARDIOVASCULAR: Keep MAP more than 65mmhg. Avoid overload. Wean levophed as able. Check Bedside IVC.     GI: Protonix. NGT for feeds; hold fornow. High risk of aspiration.     INFECTIOUS DISEASE:  Check blood, sputum culture. Procal. Nasal MRSA.  Abx per ID: Vanc and Meropenm. Vanc trough level.     HEMATOLOGICAL: change to Lovenox therapeutic.      ENDOCRINE:  Follow up FS.  Insulin protocol if needed.    MUSCULOSKELETAL: Bedrest.  Off loading.  Wound care.      Prognosis very poor.     SDU.

## 2024-02-04 NOTE — PROGRESS NOTE ADULT - SUBJECTIVE AND OBJECTIVE BOX
Patient is a 58y old  Female who presents with a chief complaint of Respiratory Distress (03 Feb 2024 11:15)        Over Night Events: remains critically ill on HHFNC and NRB. On pressor support Levo 0.04.         ROS:     All ROS are negative except HPI         PHYSICAL EXAM    ICU Vital Signs Last 24 Hrs  T(C): 37.2 (04 Feb 2024 04:00), Max: 38.1 (03 Feb 2024 20:05)  T(F): 99 (04 Feb 2024 04:00), Max: 100.5 (03 Feb 2024 20:05)  HR: 150 (04 Feb 2024 04:00) (63 - 150)  BP: 98/72 (04 Feb 2024 04:00) (93/54 - 158/80)  BP(mean): 111 (03 Feb 2024 12:00) (89 - 111)  RR: 20 (04 Feb 2024 04:00) (18 - 20)  SpO2: 88% (04 Feb 2024 04:00) (88% - 98%)    O2 Parameters below as of 04 Feb 2024 02:15  Patient On (Oxygen Delivery Method): nasal cannula, high flow  O2 Flow (L/min): 60  O2 Concentration (%): 80        CONSTITUTIONAL:  ill appearing    ENT:   Airway patent,   Mouth with normal mucosa.      EYES:   Pupils equal,   Round and reactive to light.    CARDIAC:   Normal rate,   Regular rhythm.              RESPIRATORY:   decreased air entry  Tachypnoic     GASTROINTESTINAL:  Abdomen soft,   Non-tender,   No guarding,   + BS         NEUROLOGICAL:   lethargic          02-03-24 @ 07:01  -  02-04-24 @ 07:00  --------------------------------------------------------  IN:    Enteral Tube Flush: 200 mL    Nepro with Carb Steady: 540 mL    Norepinephrine: 93.4 mL    sodium chloride 0.9%: 300 mL  Total IN: 1133.4 mL    OUT:    Oral Fluid: 0 mL    Voided (mL): 550 mL  Total OUT: 550 mL    Total NET: 583.4 mL          LABS:                            8.5    21.32 )-----------( 438      ( 03 Feb 2024 06:25 )             28.6                                               02-03    147<H>  |  109  |  18  ----------------------------<  172<H>  3.7   |  26  |  0.5<L>    Ca    8.6      03 Feb 2024 06:25  Mg     1.9     02-03    TPro  5.9<L>  /  Alb  2.7<L>  /  TBili  0.3  /  DBili  x   /  AST  19  /  ALT  17  /  AlkPhos  100  02-03                                             Urinalysis Basic - ( 03 Feb 2024 06:25 )    Color: x / Appearance: x / SG: x / pH: x  Gluc: 172 mg/dL / Ketone: x  / Bili: x / Urobili: x   Blood: x / Protein: x / Nitrite: x   Leuk Esterase: x / RBC: x / WBC x   Sq Epi: x / Non Sq Epi: x / Bacteria: x                                                  LIVER FUNCTIONS - ( 03 Feb 2024 06:25 )  Alb: 2.7 g/dL / Pro: 5.9 g/dL / ALK PHOS: 100 U/L / ALT: 17 U/L / AST: 19 U/L / GGT: x                                                  Culture - Blood (collected 01 Feb 2024 11:58)  Source: .Blood None  Preliminary Report (03 Feb 2024 23:02):    No growth at 48 Hours    Culture - Urine (collected 01 Feb 2024 11:40)  Source: Clean Catch Clean Catch (Midstream)  Final Report (03 Feb 2024 00:06):    >=3 organisms. Probable collection contamination.                                                                                       ABG - ( 02 Feb 2024 22:11 )  pH, Arterial: 7.52  pH, Blood: x     /  pCO2: 30    /  pO2: 161   / HCO3: 24    / Base Excess: 2.4   /  SaO2: 99.5                MEDICATIONS  (STANDING):  albuterol    90 MICROgram(s) HFA Inhaler 2 Puff(s) Inhalation two times a day  albuterol/ipratropium for Nebulization 3 milliLiter(s) Nebulizer every 6 hours  apixaban 5 milliGRAM(s) Oral two times a day  artificial  tears Solution 1 Drop(s) Both EYES two times a day  calcium carbonate   1250 mG (OsCal) 1 Tablet(s) Oral two times a day  chlorhexidine 2% Cloths 1 Application(s) Topical daily  gabapentin 300 milliGRAM(s) Oral every 12 hours  levETIRAcetam  Solution 500 milliGRAM(s) Oral every 12 hours  melatonin 3 milliGRAM(s) Oral at bedtime  meropenem  IVPB 1000 milliGRAM(s) IV Intermittent every 8 hours  metoprolol tartrate 25 milliGRAM(s) Oral two times a day  midodrine. 20 milliGRAM(s) Oral three times a day  norepinephrine Infusion 0.05 MICROgram(s)/kG/Min (4.9 mL/Hr) IV Continuous <Continuous>  pantoprazole  Injectable 40 milliGRAM(s) IV Push every 24 hours  polyethylene glycol 3350 17 Gram(s) Oral at bedtime  raloxifene 60 milliGRAM(s) Oral daily  scopolamine 1 mG/72 Hr(s) Patch 1 Patch Transdermal every 72 hours  senna 2 Tablet(s) Oral at bedtime  silver sulfADIAZINE 1% Cream 1 Application(s) Topical two times a day  vancomycin  IVPB 500 milliGRAM(s) IV Intermittent every 12 hours  vancomycin  IVPB        MEDICATIONS  (PRN):  acetaminophen     Tablet .. 650 milliGRAM(s) Oral every 6 hours PRN Temp greater or equal to 38C (100.4F), Mild Pain (1 - 3)  aluminum hydroxide/magnesium hydroxide/simethicone Suspension 30 milliLiter(s) Oral every 4 hours PRN Dyspepsia  ondansetron Injectable 4 milliGRAM(s) IV Push every 8 hours PRN Nausea and/or Vomiting  sodium chloride 0.65% Nasal 1 Spray(s) Both Nostrils daily PRN Nasal Congestion           CXR interpreted by me:

## 2024-02-04 NOTE — CONSULT NOTE ADULT - SUBJECTIVE AND OBJECTIVE BOX
Gastroenterology Consultation:    Patient is a 58y old  Female who presents with a chief complaint of Respiratory Distress (04 Feb 2024 07:43)        Admitted on: 01-20-24      HPI:  58F w/ PMHx Down Syndrome, nonverbal at baseline, Hypothyroidism, Cerebral palsy and Seizure Disorders presents to the ED from nursing facility/group home ([facility]) presented to ED for respiratory distress and high grade fever. Patient found to be septic on admission.Admitted to SDU for management of acute respiratory distress 2/2 CAP/Aspiration PNA (20 Jan 2024 18:22)  While pt was on the floor she developed dyspnea after swallow evaluation, was spiking high grade fever, upgraded to SDU. Patient was being managed for acute hypoxic respiratory failure required requiring oxygen supplement Patient failed speech and swallow evaluation.  SLP recommended n.p.o. with plan oral means of nutrition.  GI consulted to consider  PEG placement.      Prior EGD/ Colonoscopy:  no records available       PAST MEDICAL & SURGICAL HISTORY:  Down syndrome      Osteoporosis      Mild anemia      Neuropathy      S/P debridement  of R hip on 3/2/21            FAMILY HISTORY:  no reported fhx of gi  cancers     Social History:  no smoking alcohol or substance use     Home Medications:  docusate sodium 100 mg oral capsule: 1 cap(s) orally 2 times a day (20 Jan 2024 19:39)  gabapentin 300 mg oral tablet: 1 cap(s) orally every 12 hours (20 Jan 2024 19:37)  Melatonin 3 mg oral tablet: 1 tab(s) orally once a day (at bedtime) (20 Nov 2023 12:27)  Oyster Shell 500 (1250 mg calcium carbonate) oral tablet: 1 tab(s) orally 2 times a day (20 Jan 2024 19:42)  Protonix 40 mg oral delayed release tablet: 1 tab(s) orally once a day (after a meal) (20 Jan 2024 19:32)  raloxifene 60 mg oral tablet: 1 tab(s) orally once a day (20 Jan 2024 19:37)        MEDICATIONS  (STANDING):  albuterol    90 MICROgram(s) HFA Inhaler 2 Puff(s) Inhalation two times a day  albuterol/ipratropium for Nebulization 3 milliLiter(s) Nebulizer every 6 hours  apixaban 5 milliGRAM(s) Oral two times a day  artificial  tears Solution 1 Drop(s) Both EYES two times a day  calcium carbonate   1250 mG (OsCal) 1 Tablet(s) Oral two times a day  chlorhexidine 2% Cloths 1 Application(s) Topical daily  gabapentin 300 milliGRAM(s) Oral every 12 hours  levETIRAcetam  Solution 500 milliGRAM(s) Oral every 12 hours  melatonin 3 milliGRAM(s) Oral at bedtime  meropenem  IVPB 1000 milliGRAM(s) IV Intermittent every 8 hours  metoprolol tartrate 25 milliGRAM(s) Oral two times a day  midodrine. 20 milliGRAM(s) Oral three times a day  norepinephrine Infusion 0.05 MICROgram(s)/kG/Min (4.9 mL/Hr) IV Continuous <Continuous>  pantoprazole  Injectable 40 milliGRAM(s) IV Push every 24 hours  polyethylene glycol 3350 17 Gram(s) Oral at bedtime  raloxifene 60 milliGRAM(s) Oral daily  scopolamine 1 mG/72 Hr(s) Patch 1 Patch Transdermal every 72 hours  senna 2 Tablet(s) Oral at bedtime  silver sulfADIAZINE 1% Cream 1 Application(s) Topical two times a day  vancomycin  IVPB      vancomycin  IVPB 500 milliGRAM(s) IV Intermittent every 12 hours    MEDICATIONS  (PRN):  acetaminophen     Tablet .. 650 milliGRAM(s) Oral every 6 hours PRN Temp greater or equal to 38C (100.4F), Mild Pain (1 - 3)  aluminum hydroxide/magnesium hydroxide/simethicone Suspension 30 milliLiter(s) Oral every 4 hours PRN Dyspepsia  ondansetron Injectable 4 milliGRAM(s) IV Push every 8 hours PRN Nausea and/or Vomiting  sodium chloride 0.65% Nasal 1 Spray(s) Both Nostrils daily PRN Nasal Congestion      Allergies  No Known Allergies      Review of Systems:   unable to obtain     Physical Examination:  T(C): 37.7 (02-04-24 @ 08:00), Max: 38.1 (02-03-24 @ 20:05)  HR: 101 (02-04-24 @ 08:47) (73 - 150)  BP: 118/62 (02-04-24 @ 08:00) (93/54 - 118/62)  RR: 18 (02-04-24 @ 08:00) (18 - 20)  SpO2: 95% (02-04-24 @ 08:47) (88% - 100%)      02-02-24 @ 07:01  -  02-03-24 @ 07:00  --------------------------------------------------------  IN: 270 mL / OUT: 0 mL / NET: 270 mL    02-03-24 @ 07:01  -  02-04-24 @ 07:00  --------------------------------------------------------  IN: 1133.4 mL / OUT: 550 mL / NET: 583.4 mL          GENERAL:HFNC   HEAD:  Atraumatic, Normocephalic  EYES: conjunctiva and sclera clear  NECK: Supple, no JVD or thyromegaly  CHEST/LUNG: Clear to auscultation bilaterally  HEART: Regular rate and rhythm; normal S1, S2, No murmurs.  ABDOMEN: Soft, nontender, nondistended  NEUROLOGY: No asterixis or tremor.   SKIN: Intact, no jaundice        Data:                        9.8    17.45 )-----------( 478      ( 04 Feb 2024 06:46 )             32.5     Hgb Trend:  9.8  02-04-24 @ 06:46  8.5  02-03-24 @ 06:25  9.9  02-02-24 @ 04:59        02-04    145  |  104  |  20  ----------------------------<  159<H>  3.4<L>   |  31  |  0.5<L>    Ca    8.5      04 Feb 2024 06:46  Mg     2.0     02-04    TPro  6.0  /  Alb  2.7<L>  /  TBili  0.3  /  DBili  x   /  AST  16  /  ALT  18  /  AlkPhos  94  02-04    Liver panel trend:  TBili 0.3   /   AST 16   /   ALT 18   /   AlkP 94   /   Tptn 6.0   /   Alb 2.7    /   DBili --      02-04  TBili 0.3   /   AST 19   /   ALT 17   /   AlkP 100   /   Tptn 5.9   /   Alb 2.7    /   DBili --      02-03  TBili 0.4   /   AST 21   /   ALT 22   /   AlkP 103   /   Tptn 7.0   /   Alb 3.2    /   DBili -- 02-02  TBili 0.4   /   AST 21   /   ALT 31   /   AlkP 99   /   Tptn 7.4   /   Alb 3.3    /   DBili -- 02-01  TBili <0.2   /   AST 21   /   ALT 30   /   AlkP 90   /   Tptn 6.7   /   Alb 3.2    /   DBili -- 01-31  TBili <0.2   /   AST 23   /   ALT 34   /   AlkP 93   /   Tptn 6.9   /   Alb 3.3    /   DBili -- 01-31  TBili 0.2   /   AST 32   /   ALT 42   /   AlkP 105   /   Tptn 7.2   /   Alb 3.4    /   DBili -- 01-30  TBili 0.2   /   AST 27   /   ALT 54   /   AlkP 93   /   Tptn 6.8   /   Alb 3.1    /   DBili -- 01-29  TBili <0.2   /   AST 37   /   ALT 73   /   AlkP 92   /   Tptn 6.8   /   Alb 3.1    /   DBili -- 01-28  TBili <0.2   /   AST 58   /   ALT 77   /   AlkP 85   /   Tptn 6.4   /   Alb 3.0    /   DBili -- 01-27              Radiology:

## 2024-02-05 DIAGNOSIS — Z51.5 ENCOUNTER FOR PALLIATIVE CARE: ICD-10-CM

## 2024-02-05 DIAGNOSIS — Q90.9 DOWN SYNDROME, UNSPECIFIED: ICD-10-CM

## 2024-02-05 DIAGNOSIS — A41.9 SEPSIS, UNSPECIFIED ORGANISM: ICD-10-CM

## 2024-02-05 DIAGNOSIS — R13.10 DYSPHAGIA, UNSPECIFIED: ICD-10-CM

## 2024-02-05 DIAGNOSIS — Z71.89 OTHER SPECIFIED COUNSELING: ICD-10-CM

## 2024-02-05 LAB
ALBUMIN SERPL ELPH-MCNC: 2.9 G/DL — LOW (ref 3.5–5.2)
ALP SERPL-CCNC: 91 U/L — SIGNIFICANT CHANGE UP (ref 30–115)
ALT FLD-CCNC: 15 U/L — SIGNIFICANT CHANGE UP (ref 0–41)
ANION GAP SERPL CALC-SCNC: 17 MMOL/L — HIGH (ref 7–14)
AST SERPL-CCNC: 18 U/L — SIGNIFICANT CHANGE UP (ref 0–41)
BASOPHILS # BLD AUTO: 0.04 K/UL — SIGNIFICANT CHANGE UP (ref 0–0.2)
BASOPHILS NFR BLD AUTO: 0.2 % — SIGNIFICANT CHANGE UP (ref 0–1)
BILIRUB SERPL-MCNC: 0.3 MG/DL — SIGNIFICANT CHANGE UP (ref 0.2–1.2)
BUN SERPL-MCNC: 23 MG/DL — HIGH (ref 10–20)
CALCIUM SERPL-MCNC: 8.7 MG/DL — SIGNIFICANT CHANGE UP (ref 8.4–10.5)
CHLORIDE SERPL-SCNC: 105 MMOL/L — SIGNIFICANT CHANGE UP (ref 98–110)
CO2 SERPL-SCNC: 31 MMOL/L — SIGNIFICANT CHANGE UP (ref 17–32)
CREAT SERPL-MCNC: 0.6 MG/DL — LOW (ref 0.7–1.5)
EGFR: 105 ML/MIN/1.73M2 — SIGNIFICANT CHANGE UP
EOSINOPHIL # BLD AUTO: 0.02 K/UL — SIGNIFICANT CHANGE UP (ref 0–0.7)
EOSINOPHIL NFR BLD AUTO: 0.1 % — SIGNIFICANT CHANGE UP (ref 0–8)
GLUCOSE SERPL-MCNC: 213 MG/DL — HIGH (ref 70–99)
HCT VFR BLD CALC: 32.6 % — LOW (ref 37–47)
HGB BLD-MCNC: 9.7 G/DL — LOW (ref 12–16)
IMM GRANULOCYTES NFR BLD AUTO: 0.7 % — HIGH (ref 0.1–0.3)
LYMPHOCYTES # BLD AUTO: 1.22 K/UL — SIGNIFICANT CHANGE UP (ref 1.2–3.4)
LYMPHOCYTES # BLD AUTO: 6.2 % — LOW (ref 20.5–51.1)
MAGNESIUM SERPL-MCNC: 2.1 MG/DL — SIGNIFICANT CHANGE UP (ref 1.8–2.4)
MCHC RBC-ENTMCNC: 23.9 PG — LOW (ref 27–31)
MCHC RBC-ENTMCNC: 29.8 G/DL — LOW (ref 32–37)
MCV RBC AUTO: 80.3 FL — LOW (ref 81–99)
MONOCYTES # BLD AUTO: 0.77 K/UL — HIGH (ref 0.1–0.6)
MONOCYTES NFR BLD AUTO: 3.9 % — SIGNIFICANT CHANGE UP (ref 1.7–9.3)
NEUTROPHILS # BLD AUTO: 17.62 K/UL — HIGH (ref 1.4–6.5)
NEUTROPHILS NFR BLD AUTO: 88.9 % — HIGH (ref 42.2–75.2)
NRBC # BLD: 0 /100 WBCS — SIGNIFICANT CHANGE UP (ref 0–0)
PLATELET # BLD AUTO: 426 K/UL — HIGH (ref 130–400)
PMV BLD: 10.5 FL — HIGH (ref 7.4–10.4)
POTASSIUM SERPL-MCNC: 3.3 MMOL/L — LOW (ref 3.5–5)
POTASSIUM SERPL-SCNC: 3.3 MMOL/L — LOW (ref 3.5–5)
PROT SERPL-MCNC: 6.3 G/DL — SIGNIFICANT CHANGE UP (ref 6–8)
RBC # BLD: 4.06 M/UL — LOW (ref 4.2–5.4)
RBC # FLD: 19.8 % — HIGH (ref 11.5–14.5)
SODIUM SERPL-SCNC: 153 MMOL/L — HIGH (ref 135–146)
WBC # BLD: 19.8 K/UL — HIGH (ref 4.8–10.8)
WBC # FLD AUTO: 19.8 K/UL — HIGH (ref 4.8–10.8)

## 2024-02-05 PROCEDURE — 99223 1ST HOSP IP/OBS HIGH 75: CPT

## 2024-02-05 PROCEDURE — 99233 SBSQ HOSP IP/OBS HIGH 50: CPT

## 2024-02-05 PROCEDURE — 99291 CRITICAL CARE FIRST HOUR: CPT

## 2024-02-05 PROCEDURE — 71045 X-RAY EXAM CHEST 1 VIEW: CPT | Mod: 26

## 2024-02-05 PROCEDURE — 99232 SBSQ HOSP IP/OBS MODERATE 35: CPT | Mod: 24

## 2024-02-05 PROCEDURE — 71045 X-RAY EXAM CHEST 1 VIEW: CPT | Mod: 26,77

## 2024-02-05 RX ORDER — CASPOFUNGIN ACETATE 7 MG/ML
INJECTION, POWDER, LYOPHILIZED, FOR SOLUTION INTRAVENOUS
Refills: 0 | Status: DISCONTINUED | OUTPATIENT
Start: 2024-02-05 | End: 2024-03-11

## 2024-02-05 RX ORDER — SODIUM CHLORIDE 9 MG/ML
1000 INJECTION, SOLUTION INTRAVENOUS
Refills: 0 | Status: DISCONTINUED | OUTPATIENT
Start: 2024-02-05 | End: 2024-02-07

## 2024-02-05 RX ORDER — ENOXAPARIN SODIUM 100 MG/ML
50 INJECTION SUBCUTANEOUS EVERY 12 HOURS
Refills: 0 | Status: DISCONTINUED | OUTPATIENT
Start: 2024-02-05 | End: 2024-03-21

## 2024-02-05 RX ORDER — CASPOFUNGIN ACETATE 7 MG/ML
50 INJECTION, POWDER, LYOPHILIZED, FOR SOLUTION INTRAVENOUS EVERY 24 HOURS
Refills: 0 | Status: DISCONTINUED | OUTPATIENT
Start: 2024-02-06 | End: 2024-03-11

## 2024-02-05 RX ORDER — CASPOFUNGIN ACETATE 7 MG/ML
70 INJECTION, POWDER, LYOPHILIZED, FOR SOLUTION INTRAVENOUS ONCE
Refills: 0 | Status: COMPLETED | OUTPATIENT
Start: 2024-02-05 | End: 2024-02-05

## 2024-02-05 RX ORDER — SODIUM CHLORIDE 9 MG/ML
500 INJECTION, SOLUTION INTRAVENOUS ONCE
Refills: 0 | Status: COMPLETED | OUTPATIENT
Start: 2024-02-05 | End: 2024-02-05

## 2024-02-05 RX ADMIN — Medication 25 MILLIGRAM(S): at 17:23

## 2024-02-05 RX ADMIN — Medication 1 APPLICATION(S): at 17:21

## 2024-02-05 RX ADMIN — Medication 1 APPLICATION(S): at 05:19

## 2024-02-05 RX ADMIN — Medication 650 MILLIGRAM(S): at 23:49

## 2024-02-05 RX ADMIN — MEROPENEM 100 MILLIGRAM(S): 1 INJECTION INTRAVENOUS at 05:23

## 2024-02-05 RX ADMIN — Medication 3 MILLILITER(S): at 07:48

## 2024-02-05 RX ADMIN — Medication 4.9 MICROGRAM(S)/KG/MIN: at 14:49

## 2024-02-05 RX ADMIN — POLYETHYLENE GLYCOL 3350 17 GRAM(S): 17 POWDER, FOR SOLUTION ORAL at 21:22

## 2024-02-05 RX ADMIN — SCOPALAMINE 1 PATCH: 1 PATCH, EXTENDED RELEASE TRANSDERMAL at 06:04

## 2024-02-05 RX ADMIN — SODIUM CHLORIDE 2000 MILLILITER(S): 9 INJECTION, SOLUTION INTRAVENOUS at 08:44

## 2024-02-05 RX ADMIN — ENOXAPARIN SODIUM 50 MILLIGRAM(S): 100 INJECTION SUBCUTANEOUS at 17:19

## 2024-02-05 RX ADMIN — ALBUTEROL 2 PUFF(S): 90 AEROSOL, METERED ORAL at 07:48

## 2024-02-05 RX ADMIN — Medication 1 DROP(S): at 05:19

## 2024-02-05 RX ADMIN — RALOXIFENE HYDROCHLORIDE 60 MILLIGRAM(S): 60 TABLET, COATED ORAL at 13:39

## 2024-02-05 RX ADMIN — Medication 3 MILLILITER(S): at 01:26

## 2024-02-05 RX ADMIN — MIDODRINE HYDROCHLORIDE 20 MILLIGRAM(S): 2.5 TABLET ORAL at 13:39

## 2024-02-05 RX ADMIN — LEVETIRACETAM 500 MILLIGRAM(S): 250 TABLET, FILM COATED ORAL at 05:21

## 2024-02-05 RX ADMIN — Medication 4.9 MICROGRAM(S)/KG/MIN: at 05:24

## 2024-02-05 RX ADMIN — SENNA PLUS 2 TABLET(S): 8.6 TABLET ORAL at 21:22

## 2024-02-05 RX ADMIN — Medication 1 TABLET(S): at 05:19

## 2024-02-05 RX ADMIN — Medication 3 MILLILITER(S): at 13:28

## 2024-02-05 RX ADMIN — Medication 1 TABLET(S): at 17:21

## 2024-02-05 RX ADMIN — GABAPENTIN 300 MILLIGRAM(S): 400 CAPSULE ORAL at 17:22

## 2024-02-05 RX ADMIN — APIXABAN 5 MILLIGRAM(S): 2.5 TABLET, FILM COATED ORAL at 05:21

## 2024-02-05 RX ADMIN — SODIUM CHLORIDE 75 MILLILITER(S): 9 INJECTION, SOLUTION INTRAVENOUS at 12:34

## 2024-02-05 RX ADMIN — Medication 100 MILLIGRAM(S): at 05:23

## 2024-02-05 RX ADMIN — GABAPENTIN 300 MILLIGRAM(S): 400 CAPSULE ORAL at 05:22

## 2024-02-05 RX ADMIN — Medication 1 DROP(S): at 17:21

## 2024-02-05 RX ADMIN — CASPOFUNGIN ACETATE 70 MILLIGRAM(S): 7 INJECTION, POWDER, LYOPHILIZED, FOR SOLUTION INTRAVENOUS at 11:06

## 2024-02-05 RX ADMIN — MIDODRINE HYDROCHLORIDE 20 MILLIGRAM(S): 2.5 TABLET ORAL at 05:20

## 2024-02-05 RX ADMIN — MEROPENEM 100 MILLIGRAM(S): 1 INJECTION INTRAVENOUS at 21:22

## 2024-02-05 RX ADMIN — MIDODRINE HYDROCHLORIDE 20 MILLIGRAM(S): 2.5 TABLET ORAL at 17:20

## 2024-02-05 RX ADMIN — LEVETIRACETAM 500 MILLIGRAM(S): 250 TABLET, FILM COATED ORAL at 17:23

## 2024-02-05 RX ADMIN — MEROPENEM 100 MILLIGRAM(S): 1 INJECTION INTRAVENOUS at 13:41

## 2024-02-05 RX ADMIN — Medication 3 MILLIGRAM(S): at 21:22

## 2024-02-05 RX ADMIN — PANTOPRAZOLE SODIUM 40 MILLIGRAM(S): 20 TABLET, DELAYED RELEASE ORAL at 05:20

## 2024-02-05 RX ADMIN — Medication 25 MILLIGRAM(S): at 05:22

## 2024-02-05 RX ADMIN — CHLORHEXIDINE GLUCONATE 1 APPLICATION(S): 213 SOLUTION TOPICAL at 11:06

## 2024-02-05 NOTE — CONSULT NOTE ADULT - PROBLEM SELECTOR RECOMMENDATION 4
- will discuss with primary team regarding current medical plan and prognosis  - any withdrawal of care (including DNR/DNI) would require CAB discussion and MHLS non-objection before MOLST can be completed - will discuss with primary team regarding current medical plan and prognosis  - any withdrawal of care (including DNR/DNI) would require CAB discussion and MHLS non-objection before MOLST can be completed  - reached out to CAB ([phone]), awaiting further discussions

## 2024-02-05 NOTE — CONSULT NOTE ADULT - CONSULT REASON
Infection
Worsening R necrotic heel wound
Pancytopenia with myelocytes + in peripheral blood
R Foot necrotic wound
Foot ulcers
GOC
g tube
Sepsis

## 2024-02-05 NOTE — PROGRESS NOTE ADULT - SUBJECTIVE AND OBJECTIVE BOX
Interval History  Patient is a 58y old Female who presents with a chief complaint of Respiratory Distress (05 Feb 2024 09:36)    TEA ECHAVARRIA is admitted with a primary diagnosis of Sepsis with acute hypoxic respiratory failure      Today is hospital day 16d. The patient was examined at the bedside this morning. No acute overnight events were reported.    Admission HPI  HPI:  58F w/ PMHx Down Syndrome, nonverbal at baseline, Hypothyroidism, Cerebral palsy and Seizure Disorders presents to the ED from nursing facility/group home ([facility] presented to ED for respiratory distress and high grade fever. Patient found to be septic on admission. As per aide Janette at bedside, patient had been coughing and congested for 3x days. Denies fevers, chills, n/v/d or pain prior to admission. Patient is on a puree diet at baseline.    Vitals: Temp 104.5F (rectal), /74, , RR 24, SpO2 100% on BiPAP    Labs: Hgb 9.5 (previously 11.1), Platelet 422, INR 1.43, VBG Lactate 2.1    Imaging: CXR shows possible RML infiltrate    In the ED:  - s/p Cefepime 2g IV x1  - s/p Levaquin 750mg IV x1  - s/p 2L LR bolus    Admitted to SDU for management of acute respiratory distress 2/2 CAP/Aspiration PNA (20 Jan 2024 18:22)      Past Medical and Surgical History  Down syndrome    Osteoporosis    Mild anemia    Neuropathy    S/P debridement  of R hip on 3/2/21      Family History  FAMILY HISTORY:    Social History  Social History:      Allergies  No Known Allergies    Medications  Standing Medications  albuterol    90 MICROgram(s) HFA Inhaler 2 Puff(s) Inhalation two times a day  albuterol/ipratropium for Nebulization 3 milliLiter(s) Nebulizer four times a day  artificial  tears Solution 1 Drop(s) Both EYES two times a day  calcium carbonate   1250 mG (OsCal) 1 Tablet(s) Oral two times a day  caspofungin IVPB      chlorhexidine 2% Cloths 1 Application(s) Topical daily  enoxaparin Injectable 50 milliGRAM(s) SubCutaneous every 12 hours  gabapentin 300 milliGRAM(s) Oral every 12 hours  levETIRAcetam  Solution 500 milliGRAM(s) Oral every 12 hours  melatonin 3 milliGRAM(s) Oral at bedtime  meropenem  IVPB 1000 milliGRAM(s) IV Intermittent every 8 hours  metoprolol tartrate 25 milliGRAM(s) Oral two times a day  midodrine. 20 milliGRAM(s) Oral three times a day  norepinephrine Infusion 0.05 MICROgram(s)/kG/Min IV Continuous <Continuous>  pantoprazole  Injectable 40 milliGRAM(s) IV Push every 24 hours  polyethylene glycol 3350 17 Gram(s) Oral at bedtime  raloxifene 60 milliGRAM(s) Oral daily  scopolamine 1 mG/72 Hr(s) Patch 1 Patch Transdermal every 72 hours  senna 2 Tablet(s) Oral at bedtime  silver sulfADIAZINE 1% Cream 1 Application(s) Topical two times a day    PRN Medications  acetaminophen     Tablet .. 650 milliGRAM(s) Oral once PRN  acetaminophen     Tablet .. 650 milliGRAM(s) Oral every 6 hours PRN  aluminum hydroxide/magnesium hydroxide/simethicone Suspension 30 milliLiter(s) Oral every 4 hours PRN  ondansetron Injectable 4 milliGRAM(s) IV Push every 8 hours PRN  sodium chloride 0.65% Nasal 1 Spray(s) Both Nostrils daily PRN    Vitals  T(F): 97.2  HR: 85  BP: 108/55  RR: 18  SpO2: 97%    I&O's    02-04-24 @ 07:01  -  02-05-24 @ 07:00  --------------------------------------------------------  IN: 1044.5 mL / OUT: 1400 mL / NET: -355.5 mL    02-05-24 @ 07:01  -  02-05-24 @ 11:22  --------------------------------------------------------  IN: 0 mL / OUT: 200 mL / NET: -200 mL        Physical Examination  General: NAD, nonverbal at baseline, down syndrome  Head: NCAT  Cardiac: Tachycardic, no murmur appreciated  Pulmonary: Rales b/l  Abdominal: Soft, nontender, and nondistended  Extremities: No pitting edema  Neurologic: Alert to name, nonverbal      Labs                        9.7    19.80 )-----------( 426      ( 05 Feb 2024 05:43 )             32.6     02-05    153<H>  |  105  |  23<H>  ----------------------------<  213<H>  3.3<L>   |  31  |  0.6<L>    Ca    8.7      05 Feb 2024 05:43  Mg     2.1     02-05    TPro  6.3  /  Alb  2.9<L>  /  TBili  0.3  /  DBili  x   /  AST  18  /  ALT  15  /  AlkPhos  91  02-05      Urinalysis Basic - ( 05 Feb 2024 05:43 )    Color: x / Appearance: x / SG: x / pH: x  Gluc: 213 mg/dL / Ketone: x  / Bili: x / Urobili: x   Blood: x / Protein: x / Nitrite: x   Leuk Esterase: x / RBC: x / WBC x   Sq Epi: x / Non Sq Epi: x / Bacteria: x      CAPILLARY BLOOD GLUCOSE            Imaging  CXR  Xray Chest 1 View- PORTABLE-Urgent:   ACC: [id number] EXAM:  XR CHEST PORTABLE URGENT 1V   ORDERED BY: SEVERINO PETERS     PROCEDURE DATE:  02/02/2024          INTERPRETATION:  Clinical History / Reason for exam: Line placement    Comparison : Chest radiograph February 2, 2024 at 8:39 AM.    Technique/Positioning: Single AP view of the chest.    Findings:    Support devices: Feeding tube in stomach    Cardiac/mediastinum/hilum: Unremarkable.    Lung parenchyma/Pleura: Developing bibasilar opacities/atelectasis. No   pneumothorax.    Skeleton/soft tissues: Unchanged    Impression:    Developing bibasilar opacities/atelectasis.        --- End of Report ---            ELLIOT LANDAU MD; Attending Radiologist  This document has been electronically signed. Feb 2 2024  1:55PM (02-02-24 @ 13:53)      CT

## 2024-02-05 NOTE — PROGRESS NOTE ADULT - ATTENDING COMMENTS
IMPRESSION:    Acute hypoxemic respiratory failure on HHFNC and NRB   Likely aspiration pneumonia   Sepsis POA  Septic shock  on Levophed   Recurrent aspiration pneumonia/ prior intubation  HO GI bleed  HO OM  HO recent duodenal perforation   HO polymicrobial bacteremia   H/o CP, DS  H/o seizures    Plan as outlined above

## 2024-02-05 NOTE — CONSULT NOTE ADULT - ASSESSMENT
58F with Down syndrome, nonverbal at baseline, hypothyroidism, CP, and seizure disorder here from [facility] with fever and respiratory distress. Found to be septic on admission, from CAP/aspiration PNA, on IV vanco and meropenem, with course c/b continued fevers, and bacteremia with S. hominis. Also failed FEES, has NGT in place and will likely need PEG. Is requiring HFNC alternating with BiPAP. Is also on midodrine for hypotension. Patient is full code. Of note patient is under [place] CAB. Palliative care consulted for [facility].

## 2024-02-05 NOTE — PROGRESS NOTE ADULT - SUBJECTIVE AND OBJECTIVE BOX
TEA ECHAVARRIA  58y, Female  Allergy: No Known Allergies      LOS  16d    CHIEF COMPLAINT: Respiratory Distress (05 Feb 2024 08:08)      INTERVAL EVENTS/HPI  - continuing to spike fever on broad spectrum antibiotics , RVP unremarkable   - T(F): , Max: 101.7 (02-04-24 @ 12:00)  - Tolerating medication  - WBC Count: 19.80 (02-05-24 @ 05:43)  WBC Count: 17.45 (02-04-24 @ 06:46)     - Creatinine: 0.6 (02-05-24 @ 05:43)  Creatinine: 0.5 (02-04-24 @ 06:46)       ROS  unable to obtain history secondary to patient's mental status and/or sedation     VITALS:  T(F): 97.2, Max: 101.7 (02-04-24 @ 12:00)  HR: 85  BP: 108/55  RR: 18Vital Signs Last 24 Hrs  T(C): 36.2 (05 Feb 2024 07:21), Max: 38.7 (04 Feb 2024 12:00)  T(F): 97.2 (05 Feb 2024 07:21), Max: 101.7 (04 Feb 2024 12:00)  HR: 85 (05 Feb 2024 07:21) (66 - 122)  BP: 108/55 (05 Feb 2024 07:21) (95/55 - 108/55)  BP(mean): 74 (05 Feb 2024 07:21) (73 - 76)  RR: 18 (05 Feb 2024 07:21) (18 - 20)  SpO2: 97% (05 Feb 2024 07:50) (90% - 100%)    Parameters below as of 05 Feb 2024 07:50  Patient On (Oxygen Delivery Method): nasal cannula, high flow  O2 Flow (L/min): 60  O2 Concentration (%): 100    PHYSICAL EXAM:  ***    FH: Non-contributory  Social Hx: Non-contributory    TESTS & MEASUREMENTS:                        9.7    19.80 )-----------( 426      ( 05 Feb 2024 05:43 )             32.6     02-05    153<H>  |  105  |  23<H>  ----------------------------<  213<H>  3.3<L>   |  31  |  0.6<L>    Ca    8.7      05 Feb 2024 05:43  Mg     2.1     02-05    TPro  6.3  /  Alb  2.9<L>  /  TBili  0.3  /  DBili  x   /  AST  18  /  ALT  15  /  AlkPhos  91  02-05      LIVER FUNCTIONS - ( 05 Feb 2024 05:43 )  Alb: 2.9 g/dL / Pro: 6.3 g/dL / ALK PHOS: 91 U/L / ALT: 15 U/L / AST: 18 U/L / GGT: x           Urinalysis Basic - ( 05 Feb 2024 05:43 )    Color: x / Appearance: x / SG: x / pH: x  Gluc: 213 mg/dL / Ketone: x  / Bili: x / Urobili: x   Blood: x / Protein: x / Nitrite: x   Leuk Esterase: x / RBC: x / WBC x   Sq Epi: x / Non Sq Epi: x / Bacteria: x        Culture - Blood (collected 02-03-24 @ 11:13)  Source: .Blood None  Preliminary Report (02-04-24 @ 20:01):    No growth at 24 hours    Culture - Blood (collected 02-01-24 @ 11:58)  Source: .Blood None  Preliminary Report (02-04-24 @ 23:01):    No growth at 72 Hours    Culture - Urine (collected 02-01-24 @ 11:40)  Source: Clean Catch Clean Catch (Midstream)  Final Report (02-03-24 @ 00:06):    >=3 organisms. Probable collection contamination.    Culture - Blood (collected 01-31-24 @ 18:21)  Source: .Blood None  Preliminary Report (02-05-24 @ 01:01):    No growth at 4 days    Culture - Blood (collected 01-31-24 @ 18:21)  Source: .Blood None  Preliminary Report (02-05-24 @ 01:01):    No growth at 4 days    Culture - Urine (collected 01-23-24 @ 05:20)  Source: Clean Catch Clean Catch (Midstream)  Final Report (01-25-24 @ 00:26):    No growth    Culture - Blood (collected 01-22-24 @ 16:51)  Source: .Blood None  Final Report (01-28-24 @ 01:00):    No growth at 5 days    Culture - Urine (collected 01-21-24 @ 05:00)  Source: Clean Catch Clean Catch (Midstream)  Final Report (01-22-24 @ 07:15):    No growth    Culture - Blood (collected 01-20-24 @ 16:15)  Source: .Blood Blood-Peripheral  Gram Stain (01-21-24 @ 20:33):    Growth in aerobic bottle: Gram positive cocci in pairs  Final Report (01-22-24 @ 19:04):    Growth in aerobic bottle: Staphylococcus hominis    Isolation of Coagulase negative Staphylococcus from single blood culture    sets may represent    contamination. Contact the Microbiology Department at [phone] if    susceptibility testing is    clinically indicated.    Direct identification is available within approximately 3-5    hours either by Blood Panel Multiplexed PCR or Direct    MALDI-TOF. Details: [email or web address]  Organism: Blood Culture PCR (01-22-24 @ 19:04)  Organism: Blood Culture PCR (01-22-24 @ 19:04)      Method Type: PCR      -  Coagulase negative Staphylococcus: Detec    Culture - Blood (collected 01-20-24 @ 16:15)  Source: .Blood Blood-Peripheral  Final Report (01-25-24 @ 23:00):    No growth at 5 days        Lactate, Blood: <0.2 mmol/L (02-03-24 @ 06:25)  Lactate, Blood: 1.0 mmol/L (02-02-24 @ 04:59)  Lactate, Blood: 1.3 mmol/L (02-01-24 @ 16:00)      INFECTIOUS DISEASES TESTING  Rapid RVP Result: NotDetec (02-04-24 @ 10:28)  MRSA PCR Result.: Negative (02-03-24 @ 21:32)  Procalcitonin, Serum: 0.15 (02-03-24 @ 11:13)  Procalcitonin, Serum: 0.22 (02-01-24 @ 16:00)  MRSA PCR Result.: Negative (01-22-24 @ 05:30)  Streptococcus pneumoniae Ag, Ur Result: Negative (01-21-24 @ 05:00)  Legionella Antigen, Urine: Negative (01-21-24 @ 05:00)  Rapid RVP Result: Detected (01-21-24 @ 00:58)  Procalcitonin, Serum: 0.51 (01-20-24 @ 21:23)  Fungitell: 325 (01-20-24 @ 21:23)  Vancomycin Level, Trough: 5.9 (11-12-23 @ 17:55)  Fungitell: 138 (11-07-23 @ 08:08)  Fungitell: 115 (11-02-23 @ 11:40)  Procalcitonin, Serum: 0.04 (11-02-23 @ 11:40)  Procalcitonin, Serum: 0.26 (10-24-23 @ 11:00)  MRSA PCR Result.: Negative (10-17-23 @ 17:20)  Fungitell: >500 (10-17-23 @ 17:20)  Procalcitonin, Serum: 4.36 (10-17-23 @ 17:20)  Procalcitonin, Serum: 4.18 (10-17-23 @ 12:35)  Procalcitonin, Serum: 0.07 (10-15-23 @ 07:28)  COVID-19 PCR: NotDetec (10-12-23 @ 09:14)  Procalcitonin, Serum: 0.40 (10-07-23 @ 10:54)  Streptococcus pneumoniae Ag, Ur Result: Negative (09-30-23 @ 14:36)  Legionella Antigen, Urine: Negative (09-30-23 @ 14:36)  MRSA PCR Result.: Negative (09-29-23 @ 16:50)  Procalcitonin, Serum: 9.78 (08-12-23 @ 11:25)  MRSA PCR Result.: Negative (08-12-23 @ 06:10)  Rapid RVP Result: NotDetec (08-12-23 @ 01:33)  Procalcitonin, Serum: 0.63 (08-10-23 @ 08:46)  Procalcitonin, Serum: 7.82 (08-04-23 @ 16:13)  MRSA PCR Result.: Negative (08-04-23 @ 14:52)  Rapid RVP Result: NotDetec (08-04-23 @ 03:00)  strept    INFLAMMATORY MARKERS  Sedimentation Rate, Erythrocyte: 100 mm/Hr (02-03-24 @ 11:13)  C-Reactive Protein, Serum: 214.2 mg/L (02-03-24 @ 11:13)  C-Reactive Protein, Serum: 132.3 mg/L (02-01-24 @ 16:00)  Sedimentation Rate, Erythrocyte: 67 mm/Hr (10-15-23 @ 07:28)      RADIOLOGY & ADDITIONAL TESTS:  I have personally reviewed the last available Chest xray  CXR  Xray Chest 1 View- PORTABLE-Urgent:   ACC: [id number] EXAM:  XR CHEST PORTABLE URGENT 1V   ORDERED BY: SEVERINO PETERS     PROCEDURE DATE:  02/02/2024          INTERPRETATION:  Clinical History / Reason for exam: Line placement    Comparison : Chest radiograph February 2, 2024 at 8:39 AM.    Technique/Positioning: Single AP view of the chest.    Findings:    Support devices: Feeding tube in stomach    Cardiac/mediastinum/hilum: Unremarkable.    Lung parenchyma/Pleura: Developing bibasilar opacities/atelectasis. No   pneumothorax.    Skeleton/soft tissues: Unchanged    Impression:    Developing bibasilar opacities/atelectasis.        --- End of Report ---            ELLIOT LANDAU MD; Attending Radiologist  This document has been electronically signed. Feb 2 2024  1:55PM (02-02-24 @ 13:53)      CT      CARDIOLOGY TESTING  12 Lead ECG:   Ventricular Rate 118 BPM    Atrial Rate 118 BPM    P-R Interval 122 ms    QRS Duration 64 ms    Q-T Interval 328 ms    QTC Calculation(Bazett) 459 ms    P Axis 54 degrees    R Axis 93 degrees    T Axis 58 degrees    Diagnosis Line Sinus tachycardia  Rightward axis  Low voltage QRS  Borderline ECG    Confirmed by MARJAN MENDES MD ([id number]) on 2/2/2024 7:15:17 AM (02-01-24 @ 14:30)  12 Lead ECG:   Ventricular Rate 88 BPM    Atrial Rate 88 BPM    P-R Interval 112 ms    QRS Duration 64 ms    Q-T Interval 362 ms    QTC Calculation(Bazett) 438 ms    P Axis -11 degrees    R Axis 95 degrees    T Axis 59 degrees    Diagnosis Line Normal sinus rhythm  Rightward axis  Borderline ECG    Confirmed by caleb roberts ([id number]) on 1/31/2024 2:01:26 PM (01-31-24 @ 11:11)      MEDICATIONS  albuterol    90 MICROgram(s) HFA Inhaler 2 Inhalation two times a day  albuterol/ipratropium for Nebulization 3 Nebulizer four times a day  artificial  tears Solution 1 Both EYES two times a day  calcium carbonate   1250 mG (OsCal) 1 Oral two times a day  chlorhexidine 2% Cloths 1 Topical daily  enoxaparin Injectable 50 SubCutaneous every 12 hours  gabapentin 300 Oral every 12 hours  levETIRAcetam  Solution 500 Oral every 12 hours  melatonin 3 Oral at bedtime  meropenem  IVPB 1000 IV Intermittent every 8 hours  metoprolol tartrate 25 Oral two times a day  midodrine. 20 Oral three times a day  norepinephrine Infusion 0.05 IV Continuous <Continuous>  pantoprazole  Injectable 40 IV Push every 24 hours  polyethylene glycol 3350 17 Oral at bedtime  raloxifene 60 Oral daily  scopolamine 1 mG/72 Hr(s) Patch 1 Transdermal every 72 hours  senna 2 Oral at bedtime  silver sulfADIAZINE 1% Cream 1 Topical two times a day      WEIGHT  Weight (kg): 52.3 (01-21-24 @ 08:00)  Creatinine: 0.6 mg/dL (02-05-24 @ 05:43)      ANTIBIOTICS:  meropenem  IVPB 1000 milliGRAM(s) IV Intermittent every 8 hours      All available historical records have been reviewed       TEA ECHAVARRIA  58y, Female  Allergy: No Known Allergies      LOS  16d    CHIEF COMPLAINT: Respiratory Distress (05 Feb 2024 08:08)      INTERVAL EVENTS/HPI  - continuing to spike fever on broad spectrum antibiotics , RVP unremarkable   - T(F): , Max: 101.7 (02-04-24 @ 12:00)  - Tolerating medication  - WBC Count: 19.80 (02-05-24 @ 05:43)  WBC Count: 17.45 (02-04-24 @ 06:46)     - Creatinine: 0.6 (02-05-24 @ 05:43)  Creatinine: 0.5 (02-04-24 @ 06:46)       ROS  unable to obtain history secondary to patient's mental status and/or sedation     VITALS:  T(F): 97.2, Max: 101.7 (02-04-24 @ 12:00)  HR: 85  BP: 108/55  RR: 18Vital Signs Last 24 Hrs  T(C): 36.2 (05 Feb 2024 07:21), Max: 38.7 (04 Feb 2024 12:00)  T(F): 97.2 (05 Feb 2024 07:21), Max: 101.7 (04 Feb 2024 12:00)  HR: 85 (05 Feb 2024 07:21) (66 - 122)  BP: 108/55 (05 Feb 2024 07:21) (95/55 - 108/55)  BP(mean): 74 (05 Feb 2024 07:21) (73 - 76)  RR: 18 (05 Feb 2024 07:21) (18 - 20)  SpO2: 97% (05 Feb 2024 07:50) (90% - 100%)    Parameters below as of 05 Feb 2024 07:50  Patient On (Oxygen Delivery Method): nasal cannula, high flow  O2 Flow (L/min): 60  O2 Concentration (%): 100    PHYSICAL EXAM:  Gen: chronically ill appearing contracted  HEENT: Normocephalic, atraumatic  Neck: supple, no lymphadenopathy  CV: Regular rate & regular rhythm  Lungs: decreased BS at bases, no fremitus  Abdomen: Soft, BS present  Ext: Warm, well perfused  Neuro: non focal, not following commands  Skin: no rash, no erythema, necrotic heel ulcers   Lines: no phlebitis     FH: Non-contributory  Social Hx: Non-contributory    TESTS & MEASUREMENTS:                        9.7    19.80 )-----------( 426      ( 05 Feb 2024 05:43 )             32.6     02-05    153<H>  |  105  |  23<H>  ----------------------------<  213<H>  3.3<L>   |  31  |  0.6<L>    Ca    8.7      05 Feb 2024 05:43  Mg     2.1     02-05    TPro  6.3  /  Alb  2.9<L>  /  TBili  0.3  /  DBili  x   /  AST  18  /  ALT  15  /  AlkPhos  91  02-05      LIVER FUNCTIONS - ( 05 Feb 2024 05:43 )  Alb: 2.9 g/dL / Pro: 6.3 g/dL / ALK PHOS: 91 U/L / ALT: 15 U/L / AST: 18 U/L / GGT: x           Urinalysis Basic - ( 05 Feb 2024 05:43 )    Color: x / Appearance: x / SG: x / pH: x  Gluc: 213 mg/dL / Ketone: x  / Bili: x / Urobili: x   Blood: x / Protein: x / Nitrite: x   Leuk Esterase: x / RBC: x / WBC x   Sq Epi: x / Non Sq Epi: x / Bacteria: x        Culture - Blood (collected 02-03-24 @ 11:13)  Source: .Blood None  Preliminary Report (02-04-24 @ 20:01):    No growth at 24 hours    Culture - Blood (collected 02-01-24 @ 11:58)  Source: .Blood None  Preliminary Report (02-04-24 @ 23:01):    No growth at 72 Hours    Culture - Urine (collected 02-01-24 @ 11:40)  Source: Clean Catch Clean Catch (Midstream)  Final Report (02-03-24 @ 00:06):    >=3 organisms. Probable collection contamination.    Culture - Blood (collected 01-31-24 @ 18:21)  Source: .Blood None  Preliminary Report (02-05-24 @ 01:01):    No growth at 4 days    Culture - Blood (collected 01-31-24 @ 18:21)  Source: .Blood None  Preliminary Report (02-05-24 @ 01:01):    No growth at 4 days    Culture - Urine (collected 01-23-24 @ 05:20)  Source: Clean Catch Clean Catch (Midstream)  Final Report (01-25-24 @ 00:26):    No growth    Culture - Blood (collected 01-22-24 @ 16:51)  Source: .Blood None  Final Report (01-28-24 @ 01:00):    No growth at 5 days    Culture - Urine (collected 01-21-24 @ 05:00)  Source: Clean Catch Clean Catch (Midstream)  Final Report (01-22-24 @ 07:15):    No growth    Culture - Blood (collected 01-20-24 @ 16:15)  Source: .Blood Blood-Peripheral  Gram Stain (01-21-24 @ 20:33):    Growth in aerobic bottle: Gram positive cocci in pairs  Final Report (01-22-24 @ 19:04):    Growth in aerobic bottle: Staphylococcus hominis    Isolation of Coagulase negative Staphylococcus from single blood culture    sets may represent    contamination. Contact the Microbiology Department at [phone] if    susceptibility testing is    clinically indicated.    Direct identification is available within approximately 3-5    hours either by Blood Panel Multiplexed PCR or Direct    MALDI-TOF. Details: [email or web address]  Organism: Blood Culture PCR (01-22-24 @ 19:04)  Organism: Blood Culture PCR (01-22-24 @ 19:04)      Method Type: PCR      -  Coagulase negative Staphylococcus: Detec    Culture - Blood (collected 01-20-24 @ 16:15)  Source: .Blood Blood-Peripheral  Final Report (01-25-24 @ 23:00):    No growth at 5 days        Lactate, Blood: <0.2 mmol/L (02-03-24 @ 06:25)  Lactate, Blood: 1.0 mmol/L (02-02-24 @ 04:59)  Lactate, Blood: 1.3 mmol/L (02-01-24 @ 16:00)      INFECTIOUS DISEASES TESTING  Rapid RVP Result: NotDetec (02-04-24 @ 10:28)  MRSA PCR Result.: Negative (02-03-24 @ 21:32)  Procalcitonin, Serum: 0.15 (02-03-24 @ 11:13)  Procalcitonin, Serum: 0.22 (02-01-24 @ 16:00)  MRSA PCR Result.: Negative (01-22-24 @ 05:30)  Streptococcus pneumoniae Ag, Ur Result: Negative (01-21-24 @ 05:00)  Legionella Antigen, Urine: Negative (01-21-24 @ 05:00)  Rapid RVP Result: Detected (01-21-24 @ 00:58)  Procalcitonin, Serum: 0.51 (01-20-24 @ 21:23)  Fungitell: 325 (01-20-24 @ 21:23)  Vancomycin Level, Trough: 5.9 (11-12-23 @ 17:55)  Fungitell: 138 (11-07-23 @ 08:08)  Fungitell: 115 (11-02-23 @ 11:40)  Procalcitonin, Serum: 0.04 (11-02-23 @ 11:40)  Procalcitonin, Serum: 0.26 (10-24-23 @ 11:00)  MRSA PCR Result.: Negative (10-17-23 @ 17:20)  Fungitell: >500 (10-17-23 @ 17:20)  Procalcitonin, Serum: 4.36 (10-17-23 @ 17:20)  Procalcitonin, Serum: 4.18 (10-17-23 @ 12:35)  Procalcitonin, Serum: 0.07 (10-15-23 @ 07:28)  COVID-19 PCR: NotDetec (10-12-23 @ 09:14)  Procalcitonin, Serum: 0.40 (10-07-23 @ 10:54)  Streptococcus pneumoniae Ag, Ur Result: Negative (09-30-23 @ 14:36)  Legionella Antigen, Urine: Negative (09-30-23 @ 14:36)  MRSA PCR Result.: Negative (09-29-23 @ 16:50)  Procalcitonin, Serum: 9.78 (08-12-23 @ 11:25)  MRSA PCR Result.: Negative (08-12-23 @ 06:10)  Rapid RVP Result: NotDetec (08-12-23 @ 01:33)  Procalcitonin, Serum: 0.63 (08-10-23 @ 08:46)  Procalcitonin, Serum: 7.82 (08-04-23 @ 16:13)  MRSA PCR Result.: Negative (08-04-23 @ 14:52)  Rapid RVP Result: NotDetec (08-04-23 @ 03:00)  strept    INFLAMMATORY MARKERS  Sedimentation Rate, Erythrocyte: 100 mm/Hr (02-03-24 @ 11:13)  C-Reactive Protein, Serum: 214.2 mg/L (02-03-24 @ 11:13)  C-Reactive Protein, Serum: 132.3 mg/L (02-01-24 @ 16:00)  Sedimentation Rate, Erythrocyte: 67 mm/Hr (10-15-23 @ 07:28)      RADIOLOGY & ADDITIONAL TESTS:  I have personally reviewed the last available Chest xray  CXR  Xray Chest 1 View- PORTABLE-Urgent:   ACC: [id number] EXAM:  XR CHEST PORTABLE URGENT 1V   ORDERED BY: SEVERINO PETERS     PROCEDURE DATE:  02/02/2024          INTERPRETATION:  Clinical History / Reason for exam: Line placement    Comparison : Chest radiograph February 2, 2024 at 8:39 AM.    Technique/Positioning: Single AP view of the chest.    Findings:    Support devices: Feeding tube in stomach    Cardiac/mediastinum/hilum: Unremarkable.    Lung parenchyma/Pleura: Developing bibasilar opacities/atelectasis. No   pneumothorax.    Skeleton/soft tissues: Unchanged    Impression:    Developing bibasilar opacities/atelectasis.        --- End of Report ---            ELLIOT LANDAU MD; Attending Radiologist  This document has been electronically signed. Feb 2 2024  1:55PM (02-02-24 @ 13:53)      CT      CARDIOLOGY TESTING  12 Lead ECG:   Ventricular Rate 118 BPM    Atrial Rate 118 BPM    P-R Interval 122 ms    QRS Duration 64 ms    Q-T Interval 328 ms    QTC Calculation(Bazett) 459 ms    P Axis 54 degrees    R Axis 93 degrees    T Axis 58 degrees    Diagnosis Line Sinus tachycardia  Rightward axis  Low voltage QRS  Borderline ECG    Confirmed by MARJAN MENDES MD ([id number]) on 2/2/2024 7:15:17 AM (02-01-24 @ 14:30)  12 Lead ECG:   Ventricular Rate 88 BPM    Atrial Rate 88 BPM    P-R Interval 112 ms    QRS Duration 64 ms    Q-T Interval 362 ms    QTC Calculation(Bazett) 438 ms    P Axis -11 degrees    R Axis 95 degrees    T Axis 59 degrees    Diagnosis Line Normal sinus rhythm  Rightward axis  Borderline ECG    Confirmed by caleb roberts ([id number]) on 1/31/2024 2:01:26 PM (01-31-24 @ 11:11)      MEDICATIONS  albuterol    90 MICROgram(s) HFA Inhaler 2 Inhalation two times a day  albuterol/ipratropium for Nebulization 3 Nebulizer four times a day  artificial  tears Solution 1 Both EYES two times a day  calcium carbonate   1250 mG (OsCal) 1 Oral two times a day  chlorhexidine 2% Cloths 1 Topical daily  enoxaparin Injectable 50 SubCutaneous every 12 hours  gabapentin 300 Oral every 12 hours  levETIRAcetam  Solution 500 Oral every 12 hours  melatonin 3 Oral at bedtime  meropenem  IVPB 1000 IV Intermittent every 8 hours  metoprolol tartrate 25 Oral two times a day  midodrine. 20 Oral three times a day  norepinephrine Infusion 0.05 IV Continuous <Continuous>  pantoprazole  Injectable 40 IV Push every 24 hours  polyethylene glycol 3350 17 Oral at bedtime  raloxifene 60 Oral daily  scopolamine 1 mG/72 Hr(s) Patch 1 Transdermal every 72 hours  senna 2 Oral at bedtime  silver sulfADIAZINE 1% Cream 1 Topical two times a day      WEIGHT  Weight (kg): 52.3 (01-21-24 @ 08:00)  Creatinine: 0.6 mg/dL (02-05-24 @ 05:43)      ANTIBIOTICS:  meropenem  IVPB 1000 milliGRAM(s) IV Intermittent every 8 hours      All available historical records have been reviewed

## 2024-02-05 NOTE — CONSULT NOTE ADULT - PROBLEM SELECTOR RECOMMENDATION 5
- will follow  ______________  Wu Jenkins MD  Palliative Medicine  [facility]   of Geriatric and Palliative Medicine  [phone]

## 2024-02-05 NOTE — PROGRESS NOTE ADULT - ASSESSMENT
58F w/ PMHx Down Syndrome, nonverbal at baseline, Hypothyroidism, Cerebral palsy and Seizure Disorders presents to the ED from nursing facility/group home ([facility]) presented to ED for respiratory distress and high grade fever. Patient found to be septic on admission.Admitted to SDU for management of acute respiratory distress 2/2 CAP/Aspiration PNA (20 Jan 2024 18:22)  While pt was on the floor she developed dyspnea after swallow evaluation, was spiking high grade fever, consulted by pulmonary/critical care and was upgraded back to SDU.    A/P  # Sepsis POA / Acute hypoxic resp failure / RSV bronchiolitis /  H/o Dysphagia/ suspected aspiration  - pt is spiking high grade fever on broad spectrum ABx, procalcitonin is low, f/u  full respiratory panel   - inflammatory markers elevated   - MRSA negative   - BCx (1/20): Staph hominis -  contaminant repeat BCx have been NGTD  - Failed FEES, put  NG tube for feedings and medications, pt'll likely need PEG, consulted by GI   - Aspiration precautions, order chest PT vest   - c/w Scopolamine patch  - supplement oxygen, monitor pulse Ox ,repeat ABG   - c/w duoneb  - ID is following, recommendations noted:   - ADD Caspo 70mg x1 and 50mg q24h IV , hx unclear elevated fungitell. No clear source  - Continue meropenem 1g q8h IV  - Repeat fungitell , send histoplasma Ab serum   - CT CHest recommended by ID, consider Galium scan     - pulmonary is following, recommendations noted;  - On HFNC; alternate with BIPAP. Keep spo2 more than 92%.    # Hypernatremia  - start D5W at 75 ml/h for 24 hours   - f/u repeat Na level in AM     # H/o hypotension  - c/w  Midodrine dose to 15 mg Q 8 hours   - taper  off pressure support  ast tolerated     # Elevated Troponin , type 2 MI/  Sinus tachycardia  - c/w telemetry monitoring   - Repeat EKG: Normal sinus rhythm  - c/w  metoprolol  -  maintain fluid balance     # Seizure Disorder  - c/w  Keppra   - seizure precautions  - keep Mg above 2.0     # H/o lower ext DVT  - c/w Eliquis    # Hypothyroidism  - Thyroid Stimulating Hormone, Serum: 0.51 uIU/mL (01.21.24 @ 06:34)  - check FT4     # Normocytic Anemia   - stable    # Down Syndrome/  Cerebral Palsy  - supportive care  - prevent falls and aspiration   - failed speech and swallow, needs PEG   - on Raloxifene     # Full code    # GI prophylaxis       Pending (specify):  pulmonary toilet, chest PT vest,  frequent suction, Scopolamine patch, add Caspofungin, d/c Vanco, On HFNC; alternate with BIPAP. Keep spo2 more than 92%, start D5W @ 75 ml/h f/u repeat Na sena AM,  keep MAP above 60,   monitor WBC and fever curve,  supportive care, chest CT recommended by ID, consider galium scan ( unknown source of infection).

## 2024-02-05 NOTE — CONSULT NOTE ADULT - SUBJECTIVE AND OBJECTIVE BOX
HPI: 58F with Down syndrome, nonverbal at baseline, hypothyroidism, CP, and seizure disorder here from [facility] with fever and respiratory distress. Found to be septic on admission, from CAP/aspiration PNA, on IV vanco and meropenem, with course c/b continued fevers, and bacteremia with S. hominis. Also failed FEES, has NGT in place and will likely need PEG. Is requiring HFNC alternating with BiPAP. Is also on midodrine for hypotension. Patient is full code. Of note patient is under [facility]. Palliative care consulted for GOC.    PERTINENT PM/SXH:   Down syndrome    Osteoporosis    Mild anemia    Neuropathy      No significant past surgical history    S/P debridement      FAMILY HISTORY:  no pertinent family history      SOCIAL HISTORY:   Significant other/partner[ ]  Children[ ]  Confucianist/Spirituality:  Substance hx:  [ ]   Tobacco hx:  [ ]   Alcohol hx: [ ]   Living Situation: [ ]Home  [ ]Long term care  [ ]Rehab [ ]Other  Home Services: [ ] HHA [ ] Visting RN [ ] Hospice  Occupation:  Home Opioid hx:  [ ] Y [ ] N [ ] I-Stop Reference No:    ADVANCE DIRECTIVES:    [ ] Full Code [ ] DNR  MOLST  [ ]  Living Will  [ ]   DECISION MAKER(s):  [ ] Health Care Proxy(s)  [ ] Surrogate(s)  [ ] Guardian           Name(s): Phone Number(s): [facility]    BASELINE (I)ADL(s) (prior to admission):  [place]: [ ]Total  [ ] Moderate [ ]Dependent  Palliative Performance Status Version 2:         %    http://npcrc.org/files/news/palliative_performance_scale_ppsv2.pdf    Allergies    No Known Allergies    Intolerances    MEDICATIONS  (STANDING):  albuterol    90 MICROgram(s) HFA Inhaler 2 Puff(s) Inhalation two times a day  albuterol/ipratropium for Nebulization 3 milliLiter(s) Nebulizer four times a day  artificial  tears Solution 1 Drop(s) Both EYES two times a day  calcium carbonate   1250 mG (OsCal) 1 Tablet(s) Oral two times a day  caspofungin IVPB      chlorhexidine 2% Cloths 1 Application(s) Topical daily  enoxaparin Injectable 50 milliGRAM(s) SubCutaneous every 12 hours  gabapentin 300 milliGRAM(s) Oral every 12 hours  levETIRAcetam  Solution 500 milliGRAM(s) Oral every 12 hours  melatonin 3 milliGRAM(s) Oral at bedtime  meropenem  IVPB 1000 milliGRAM(s) IV Intermittent every 8 hours  metoprolol tartrate 25 milliGRAM(s) Oral two times a day  midodrine. 20 milliGRAM(s) Oral three times a day  norepinephrine Infusion 0.05 MICROgram(s)/kG/Min (4.9 mL/Hr) IV Continuous <Continuous>  pantoprazole  Injectable 40 milliGRAM(s) IV Push every 24 hours  polyethylene glycol 3350 17 Gram(s) Oral at bedtime  raloxifene 60 milliGRAM(s) Oral daily  scopolamine 1 mG/72 Hr(s) Patch 1 Patch Transdermal every 72 hours  senna 2 Tablet(s) Oral at bedtime  silver sulfADIAZINE 1% Cream 1 Application(s) Topical two times a day    MEDICATIONS  (PRN):  acetaminophen     Tablet .. 650 milliGRAM(s) Oral every 6 hours PRN Temp greater or equal to 38C (100.4F), Mild Pain (1 - 3)  acetaminophen     Tablet .. 650 milliGRAM(s) Oral once PRN Temp greater or equal to 38.5C (101.3F)  aluminum hydroxide/magnesium hydroxide/simethicone Suspension 30 milliLiter(s) Oral every 4 hours PRN Dyspepsia  ondansetron Injectable 4 milliGRAM(s) IV Push every 8 hours PRN Nausea and/or Vomiting  sodium chloride 0.65% Nasal 1 Spray(s) Both Nostrils daily PRN Nasal Congestion      PRESENT SYMPTOMS: [ ]Unable to obtain due to poor mentation   Source if other than patient:  [ ]Family   [ ]Team     Pain: [ ]yes [ ]no  QOL impact -   Location -                    Aggravating factors -  Quality -  Radiation -  Timing-  Severity (0-10 scale):  Minimal acceptable level (0-10 scale):     CPOT:    https://www.sccm.org/getattachment/iil89u76-8t6h-0e2u-1f8h-1223a5408c9s/Critical-Care-Pain-Observation-Tool-(CPOT)    PAIN AD Score:   [email or web address]/painad.pdf (press ctrl +  left click to view)    Dyspnea:                           [ ]None[ ]Mild [ ]Moderate [ ]Severe     Respiratory Distress Observation Scale (RDOS):   A score of 0 to 2 signifies little or no respiratory distress, 3 signifies mild distress, scores 4 to 6 indicate moderate distress, and scores greater than 7 signify severe distress  [email or web address]/[email or web address]/files/[email or web address]-[email or web address].pdf    Anxiety:                             [ ]None[ ]Mild [ ]Moderate [ ]Severe   Fatigue:                             [ ]None[ ]Mild [ ]Moderate [ ]Severe   Nausea:                             [ ]None[ ]Mild [ ]Moderate [ ]Severe   Loss of appetite:              [ ]None[ ]Mild [ ]Moderate [ ]Severe   Constipation:                    [ ]None[ ]Mild [ ]Moderate [ ]Severe    Other Symptoms:  [ ]All other review of systems negative     Palliative Performance Status Version 2:         %    http://npcrc.org/files/news/palliative_performance_scale_ppsv2.pdf  PHYSICAL EXAM:  Vital Signs Last 24 Hrs  T(C): 36.2 (05 Feb 2024 07:21), Max: 38.7 (04 Feb 2024 12:00)  T(F): 97.2 (05 Feb 2024 07:21), Max: 101.7 (04 Feb 2024 12:00)  HR: 85 (05 Feb 2024 07:21) (66 - 122)  BP: 108/55 (05 Feb 2024 07:21) (95/55 - 108/55)  BP(mean): 74 (05 Feb 2024 07:21) (73 - 76)  RR: 18 (05 Feb 2024 07:21) (18 - 20)  SpO2: 97% (05 Feb 2024 07:50) (90% - 100%)    Parameters below as of 05 Feb 2024 07:50  Patient On (Oxygen Delivery Method): nasal cannula, high flow  O2 Flow (L/min): 60  O2 Concentration (%): 100 I&O's Summary    04 Feb 2024 07:01  -  05 Feb 2024 07:00  --------------------------------------------------------  IN: 1044.5 mL / OUT: 1400 mL / NET: -355.5 mL    05 Feb 2024 07:01  -  05 Feb 2024 09:36  --------------------------------------------------------  IN: 0 mL / OUT: 200 mL / NET: -200 mL        GENERAL:  [X ] No acute distress [ ]Lethargic  [ ]Unarousable  [ ]Verbal  [ ]Non-Verbal [ ]Cachexia    BEHAVIORAL/PSYCH:  [ ]Alert and Oriented x  [ ] Anxiety [ ] Delirium [ ] Agitation [X ] Calm   EYES: [ ] No scleral icterus [ ] Scleral icterus [ ] Closed  ENMT:  [ ]Dry mouth  [ ]No external oral lesions [ X] No external ear or nose lesions  CARDIOVASCULAR:  [ ]Regular [ ]Irregular [ ]Tachy [ ]Not Tachy  [ ]Raheem [ ] Edema [ ] No edema  PULMONARY:  [ ]Tachypnea  [ ]Audible excessive secretions [X ] No labored breathing [ ] labored breathing  GASTROINTESTINAL: [ ]Soft  [ ]Distended  [ X]Not distended [ ]Non tender [ ]Tender  MUSCULOSKELETAL: [ ]No clubbing [ ] clubbing  [ X] No cyanosis [ ] cyanosis  NEUROLOGIC: [ ]No focal deficits  [ ]Follows commands  [ ]Does not follow commands  [ ]Cognitive impairment  [ ]Dysphagia  [ ]Dysarthria  [ ]Paresis   SKIN: [ ] Jaundiced [X ] Non-jaundiced [ ]Rash [ ]No Rash [ ] Warm [ ] Dry  MISC/LINES: [ ] ET tube [ ] Trach [ ]NGT/OGT [ ]PEG [ ]Madsen    CRITICAL CARE:  [ ] Shock Present  [ ]Septic [ ]Cardiogenic [ ]Neurologic [ ]Hypovolemic  [ ]  Vasopressors [ ]  Inotropes   [ ]Respiratory failure present [ ]Mechanical ventilation [ ]Non-invasive ventilatory support [ ]High flow  [ ]Acute  [ ]Chronic [ ]Hypoxic  [ ]Hypercarbic [ ]Other  [ ]Other organ failure     LABS: reviewed by me                        9.7    19.80 )-----------( 426      ( 05 Feb 2024 05:43 )             32.6   02-05    153<H>  |  105  |  23<H>  ----------------------------<  213<H>  3.3<L>   |  31  |  0.6<L>    Ca    8.7      05 Feb 2024 05:43  Mg     2.1     02-05    TPro  6.3  /  Alb  2.9<L>  /  TBili  0.3  /  DBili  x   /  AST  18  /  ALT  15  /  AlkPhos  91  02-05      Urinalysis Basic - ( 05 Feb 2024 05:43 )    Color: x / Appearance: x / SG: x / pH: x  Gluc: 213 mg/dL / Ketone: x  / Bili: x / Urobili: x   Blood: x / Protein: x / Nitrite: x   Leuk Esterase: x / RBC: x / WBC x   Sq Epi: x / Non Sq Epi: x / Bacteria: x      RADIOLOGY & ADDITIONAL STUDIES: reviewed by me    PROTEIN CALORIE MALNUTRITION PRESENT: [ ]mild [ ]moderate [ ]severe [ ]underweight [ ]morbid obesity  https://www.andeal.org/vault/2440/web/files/ONC/Table_Clinical%20Characteristics%20to%20Document%20Malnutrition-White%20JV%20et%20al%202012.pdf    Height (cm): 136.4 (01-24-24 @ 09:52), 154.9 (11-17-23 @ 15:00), 145 (10-02-23 @ 12:00)  Weight (kg): 52.3 (01-21-24 @ 08:00), 60 (11-17-23 @ 15:00), 55.3 (10-02-23 @ 12:00)  BMI (kg/m2): 28.1 (01-24-24 @ 09:52), 21.8 (01-21-24 @ 08:00), 25 (11-17-23 @ 15:00)    [ ]PPSV2 < or = to 30% [ ]significant weight loss  [ ]poor nutritional intake  [ ]anasarca      [ ]Artificial Nutrition          Palliative Care Spiritual/Emotional Screening Tool Question  Severity (0-4):                    OR                    [X ] Unable to determine/NA  Score of 2 or greater indicates recommendation of Chaplaincy referral  Chaplaincy Referral: [ ] Yes [ ] Refused [ ] Following     Caregiver [place]:  [ ] Yes [ ] No    OR    [x ] Unable to determine. Will assess at later time if appropriate.  Social Work Referral [ ]  Patient and Family Centered Care Referral [ ]    Anticipatory Grief Present: [ ] Yes [ ] No    OR     [ x] Unable to determine. Will assess at later time if appropriate.  Social Work Referral [ ]  Patient and Family Centered Care Referral [ ]    REFERRALS:   [ ]Chaplaincy  [ ]Hospice  [ ]Child Life  [ ]Social Work  [ ]Case management [ ]Holistic Therapy     Palliative care education provided to patient and/or family    Goals of Care Document:     ______________  Wu Jenkins MD  Palliative Medicine  [facility]   of Geriatric and Palliative Medicine  [phone]   HPI: 58F with Down syndrome, nonverbal at baseline, hypothyroidism, CP, and seizure disorder here from [facility] with fever and respiratory distress. Found to be septic on admission, from CAP/aspiration PNA, on IV vanco and meropenem, with course c/b continued fevers, and bacteremia with S. hominis. Also failed FEES, has NGT in place and will likely need PEG. Is requiring HFNC alternating with BiPAP. Is also on midodrine for hypotension. Patient is full code. Of note patient is under [facility]. Palliative care consulted for GOC.    PERTINENT PM/SXH:   Down syndrome    Osteoporosis    Mild anemia    Neuropathy      No significant past surgical history    S/P debridement      FAMILY HISTORY:  no pertinent family history      SOCIAL HISTORY:   Significant other/partner[ ]  Children[ ]  Rastafari/Spirituality:  Substance hx:  [ ]   Tobacco hx:  [ ]   Alcohol hx: [ ]   Living Situation: [ ]Home  [ ]Long term care  [ ]Rehab [X ]Other [facility]  Home Services: [ ] HHA [ ] Visting RN [ ] Hospice  Occupation:  Home Opioid hx:  [ ] Y [ ] N [ ] I-Stop Reference No:    ADVANCE DIRECTIVES:    [X ] Full Code [ ] DNR  MOLST  [ ]  Living Will  [ ]   DECISION MAKER(s):  [ ] Health Care Proxy(s)  [ ] Surrogate(s)  [ ] Guardian           Name(s): Phone Number(s): [facility]    BASELINE (I)ADL(s) (prior to admission):  [place]: [ ]Total  [ ] Moderate [ ]Dependent  Palliative Performance Status Version 2:         %    http://npcrc.org/files/news/palliative_performance_scale_ppsv2.pdf    Allergies    No Known Allergies    Intolerances    MEDICATIONS  (STANDING):  albuterol    90 MICROgram(s) HFA Inhaler 2 Puff(s) Inhalation two times a day  albuterol/ipratropium for Nebulization 3 milliLiter(s) Nebulizer four times a day  artificial  tears Solution 1 Drop(s) Both EYES two times a day  calcium carbonate   1250 mG (OsCal) 1 Tablet(s) Oral two times a day  caspofungin IVPB      chlorhexidine 2% Cloths 1 Application(s) Topical daily  enoxaparin Injectable 50 milliGRAM(s) SubCutaneous every 12 hours  gabapentin 300 milliGRAM(s) Oral every 12 hours  levETIRAcetam  Solution 500 milliGRAM(s) Oral every 12 hours  melatonin 3 milliGRAM(s) Oral at bedtime  meropenem  IVPB 1000 milliGRAM(s) IV Intermittent every 8 hours  metoprolol tartrate 25 milliGRAM(s) Oral two times a day  midodrine. 20 milliGRAM(s) Oral three times a day  norepinephrine Infusion 0.05 MICROgram(s)/kG/Min (4.9 mL/Hr) IV Continuous <Continuous>  pantoprazole  Injectable 40 milliGRAM(s) IV Push every 24 hours  polyethylene glycol 3350 17 Gram(s) Oral at bedtime  raloxifene 60 milliGRAM(s) Oral daily  scopolamine 1 mG/72 Hr(s) Patch 1 Patch Transdermal every 72 hours  senna 2 Tablet(s) Oral at bedtime  silver sulfADIAZINE 1% Cream 1 Application(s) Topical two times a day    MEDICATIONS  (PRN):  acetaminophen     Tablet .. 650 milliGRAM(s) Oral every 6 hours PRN Temp greater or equal to 38C (100.4F), Mild Pain (1 - 3)  acetaminophen     Tablet .. 650 milliGRAM(s) Oral once PRN Temp greater or equal to 38.5C (101.3F)  aluminum hydroxide/magnesium hydroxide/simethicone Suspension 30 milliLiter(s) Oral every 4 hours PRN Dyspepsia  ondansetron Injectable 4 milliGRAM(s) IV Push every 8 hours PRN Nausea and/or Vomiting  sodium chloride 0.65% Nasal 1 Spray(s) Both Nostrils daily PRN Nasal Congestion      PRESENT SYMPTOMS: [X ]Unable to obtain due to poor mentation   Source if other than patient:  [ ]Family   [ ]Team     Pain: [ ]yes [ ]no  QOL impact -   Location -                    Aggravating factors -  Quality -  Radiation -  Timing-  Severity (0-10 scale):  Minimal acceptable level (0-10 scale):     CPOT:    https://www.sccm.org/getattachment/cdp24v59-8i1z-[email or web address]-5k9k-6301y5746p9l/Critical-Care-Pain-Observation-Tool-(CPOT)    PAIN AD Score: 0  http://geriatrictoolkit.[place].[facility]/cog/painad.pdf (press ctrl +  left click to view)    Dyspnea:                           [ ]None[ ]Mild [ ]Moderate [ ]Severe     Respiratory Distress Observation Scale (RDOS): 0  A score of 0 to 2 signifies little or no respiratory distress, 3 signifies mild distress, scores 4 to 6 indicate moderate distress, and scores greater than 7 signify severe distress  https://www.[facility].ca/sites/default/files/[email or web address]-[email or web address]-[email or web address]-[email or web address].pdf    Anxiety:                             [ ]None[ ]Mild [ ]Moderate [ ]Severe   Fatigue:                             [ ]None[ ]Mild [ ]Moderate [ ]Severe   Nausea:                             [ ]None[ ]Mild [ ]Moderate [ ]Severe   Loss of appetite:              [ ]None[ ]Mild [ ]Moderate [ ]Severe   Constipation:                    [ ]None[ ]Mild [ ]Moderate [ ]Severe    Other Symptoms:  [ ]All other review of systems negative     Palliative Performance Status Version 2:         %    http://[email or web address].org/files/news/palliative_performance_scale_[email or web address].pdf  PHYSICAL EXAM:  Vital Signs Last 24 Hrs  T(C): 36.2 (05 Feb 2024 07:21), Max: 38.7 (04 Feb 2024 12:00)  T(F): 97.2 (05 Feb 2024 07:21), Max: 101.7 (04 Feb 2024 12:00)  HR: 85 (05 Feb 2024 07:21) (66 - 122)  BP: 108/55 (05 Feb 2024 07:21) (95/55 - 108/55)  BP(mean): 74 (05 Feb 2024 07:21) (73 - 76)  RR: 18 (05 Feb 2024 07:21) (18 - 20)  SpO2: 97% (05 Feb 2024 07:50) (90% - 100%)    Parameters below as of 05 Feb 2024 07:50  Patient On (Oxygen Delivery Method): nasal cannula, high flow  O2 Flow (L/min): 60  O2 Concentration (%): 100 I&O's Summary    04 Feb 2024 07:01  -  05 Feb 2024 07:00  --------------------------------------------------------  IN: 1044.5 mL / OUT: 1400 mL / NET: -355.5 mL    05 Feb 2024 07:01  -  05 Feb 2024 09:36  --------------------------------------------------------  IN: 0 mL / OUT: 200 mL / NET: -200 mL        GENERAL:  [X ] No acute distress [ ]Lethargic  [ ]Unarousable  [ ]Verbal  [ ]Non-Verbal [ ]Cachexia    BEHAVIORAL/PSYCH:  [ ]Alert and Oriented x  [ ] Anxiety [ ] Delirium [ ] Agitation [X ] Calm   EYES: [ X] No scleral icterus [ ] Scleral icterus [ ] Closed  ENMT:  [ ]Dry mouth  [ ]No external oral lesions [ X] No external ear or nose lesions  CARDIOVASCULAR:  [ ]Regular [ ]Irregular [ ]Tachy [ ]Not Tachy  [ ]Raheem [ ] Edema [ ] No edema  PULMONARY:  [ ]Tachypnea  [ ]Audible excessive secretions [X ] No labored breathing [ ] labored breathing  GASTROINTESTINAL: [ ]Soft  [ ]Distended  [ X]Not distended [ ]Non tender [ ]Tender  MUSCULOSKELETAL: [ ]No clubbing [ ] clubbing  [ X] No cyanosis [ ] cyanosis  NEUROLOGIC: [ ]No focal deficits  [ ]Follows commands  [ ]Does not follow commands  [X ]Cognitive impairment  [ ]Dysphagia  [ ]Dysarthria  [ ]Paresis   SKIN: [ ] Jaundiced [X ] Non-jaundiced [ ]Rash [ ]No Rash [ ] Warm [ ] Dry  MISC/LINES: [ ] ET tube [ ] Trach [ ]NGT/OGT [ ]PEG [ ]Madsen    CRITICAL CARE:  [ ] Shock Present  [ ]Septic [ ]Cardiogenic [ ]Neurologic [ ]Hypovolemic  [ ]  Vasopressors [ ]  Inotropes   [ ]Respiratory failure present [ ]Mechanical ventilation [ ]Non-invasive ventilatory support [ ]High flow  [ ]Acute  [ ]Chronic [ ]Hypoxic  [ ]Hypercarbic [ ]Other  [ ]Other organ failure     LABS: reviewed by me                        9.7    19.80 )-----------( 426      ( 05 Feb 2024 05:43 )             32.6   02-05    153<H>  |  105  |  23<H>  ----------------------------<  213<H>  3.3<L>   |  31  |  0.6<L>    Ca    8.7      05 Feb 2024 05:43  Mg     2.1     02-05    TPro  6.3  /  Alb  2.9<L>  /  TBili  0.3  /  DBili  x   /  AST  18  /  ALT  15  /  AlkPhos  91  02-05      Urinalysis Basic - ( 05 Feb 2024 05:43 )    Color: x / Appearance: x / SG: x / pH: x  Gluc: 213 mg/dL / Ketone: x  / Bili: x / Urobili: x   Blood: x / Protein: x / Nitrite: x   Leuk Esterase: x / RBC: x / WBC x   Sq Epi: x / Non Sq Epi: x / Bacteria: x      RADIOLOGY & ADDITIONAL STUDIES: reviewed by m    CXR 2/5/24    Support devices: Enteric tube satisfactory position.    Cardiac/ Mediastinum: unremarkable    Lungs/ Pleura: Diminishing left lung opacity/effusion. Stable smaller   right basilar opacity. No pneumothorax.    Skeletal/ soft tissues: Stable    PROTEIN CALORIE MALNUTRITION PRESENT: [ ]mild [ ]moderate [ ]severe [ ]underweight [ ]morbid obesity  https://www.andeal.org/vault/2440/web/files/ONC/Table_Clinical%20Characteristics%20to%20Document%20Malnutrition-White%20JV%20et%20al%202012.pdf    Height (cm): 136.4 (01-24-24 @ 09:52), 154.9 (11-17-23 @ 15:00), 145 (10-02-23 @ 12:00)  Weight (kg): 52.3 (01-21-24 @ 08:00), 60 (11-17-23 @ 15:00), 55.3 (10-02-23 @ 12:00)  BMI (kg/m2): 28.1 (01-24-24 @ 09:52), 21.8 (01-21-24 @ 08:00), 25 (11-17-23 @ 15:00)    [ ]PPSV2 < or = to 30% [ ]significant weight loss  [ ]poor nutritional intake  [ ]anasarca      [ ]Artificial Nutrition          Palliative Care Spiritual/Emotional Screening Tool Question  Severity (0-4):                    OR                    [X ] Unable to determine/NA  Score of 2 or greater indicates recommendation of Chaplaincy referral  Chaplaincy Referral: [ ] Yes [ ] Refused [ ] Following     Caregiver [place]:  [ ] Yes [ ] No    OR    [x ] Unable to determine. Will assess at later time if appropriate.  Social Work Referral [ ]  Patient and Family Centered Care Referral [ ]    Anticipatory Grief Present: [ ] Yes [ ] No    OR     [ x] Unable to determine. Will assess at later time if appropriate.  Social Work Referral [ ]  Patient and Family Centered Care Referral [ ]    REFERRALS:   [ ]Chaplaincy  [ ]Hospice  [ ]Child Life  [ ]Social Work  [ ]Case management [ ]Holistic Therapy     Palliative care education provided to patient and/or family    Goals of Care Document:     ______________  Wu Jenkins MD  Palliative Medicine  [facility]   of Geriatric and Palliative Medicine  [phone]

## 2024-02-05 NOTE — PROGRESS NOTE ADULT - SUBJECTIVE AND OBJECTIVE BOX
58F w/ PMHx Down Syndrome, nonverbal at baseline, Hypothyroidism, Cerebral palsy and Seizure Disorders presents to the ED from nursing facility/group home ([facility]) presented to ED for respiratory distress and high grade fever. Patient found to be septic on admission.Admitted to SDU for management of acute respiratory distress 2/2 CAP/Aspiration PNA (20 Jan 2024 18:22)  While pt was on the floor she developed dyspnea after swallow evaluation, was spiking high grade fever, consulted by pulmonary/critical care and was upgraded back to SDU.  Today pt is awake, nonverbal, calm today.       PAST MEDICAL & SURGICAL HISTORY:  Down syndrome  Osteoporosis  Mild anemia  Neuropathy  S/P debridement of R hip on 3/2/21    Allergies  No Known Allergies    Vital Signs Last 24 Hrs  T(C): 36.1 (05 Feb 2024 11:00), Max: 38.4 (04 Feb 2024 16:00)  T(F): 97 (05 Feb 2024 11:00), Max: 101.1 (04 Feb 2024 16:00)  HR: 93 (05 Feb 2024 11:00) (66 - 112)  BP: 105/64 (05 Feb 2024 11:00) (95/55 - 108/55)  BP(mean): 79 (05 Feb 2024 11:00) (73 - 79)  RR: 20 (05 Feb 2024 11:00) (18 - 20)  SpO2: 96% (05 Feb 2024 11:00) (90% - 100%)    Parameters below as of 05 Feb 2024 11:00  Patient On (Oxygen Delivery Method): nasal cannula, high flow      PHYSICAL EXAM:   GENERAL: in mild distress, anxious with eyes open, nonverbal   HEENT: atraumatic, EOMI.  Lungs:   coarse breath sounds  CVS: SIS2 +, tachycardia, no murmur.  Abdomen/ GI:  Soft,  NT, ND, BS+  CNS: awake , non verbal, anxious   Ext: contracted  Skin: no rash, no ulcers.    LABs:                           9.7    19.80 )-----------( 426      ( 05 Feb 2024 05:43 )             32.6   02-05    153<H>  |  105  |  23<H>  ----------------------------<  213<H>  3.3<L>   |  31  |  0.6<L>    Ca    8.7      05 Feb 2024 05:43  Mg     2.1     02-05    TPro  6.3  /  Alb  2.9<L>  /  TBili  0.3  /  DBili  x   /  AST  18  /  ALT  15  /  AlkPhos  91  02-05        Parameters below as of 04 Feb 2024 08:47  Patient On (Oxygen Delivery Method): nasal cannula, high flow  O2 Flow (L/min): 60  O2 Concentration (%): 100  Urinalysis Basic - ( 01 Feb 2024 11:40 )    Color: Yellow / Appearance: Turbid / SG: >1.030 / pH: x  Gluc: x / Ketone: Negative mg/dL  / Bili: Negative / Urobili: 1.0 mg/dL   Blood: x / Protein: 100 mg/dL / Nitrite: Negative   Leuk Esterase: Negative / RBC: 10 /HPF / WBC 5 /HPF   Sq Epi: x / Non Sq Epi: 2 /HPF / Bacteria: Negative /HPF    Culture - Blood (01.31.24 @ 18:21)   Specimen Source: .Blood None  Culture Results:   No growth at 24 hoursCulture - Urine (01.23.24 @ 05:20)   Specimen Source: Clean Catch Clean Catch (Midstream)  Culture Results:   No growthCulture - Blood (01.20.24 @ 16:15)   - Coagulase negative Staphylococcus: Detec  Gram Stain:   Growth in aerobic bottle: Gram positive cocci in pairs  Specimen Source: .Blood Blood-Peripheral  Organism: Blood Culture PCR  Culture Results:   Growth in aerobic bottle: Staphylococcus hominis   Isolation of Coagulase negative Staphylococcus from single blood culture     sets may represent   contamination. Contact the Microbiology Department at [phone] if   susceptibility testing is   clinically indicated.   Direct identification is available within approximately 3-5   hours either by Blood Panel Multiplexed PCR or Direct   MALDI-TOF. Details: [email or web address]  Organism Identification: Blood Culture PCR  Method Type: PCR    < from: Xray Chest 1 View- PORTABLE-Routine (Xray Chest 1 View- PORTABLE-Routine in AM.) (02.05.24 @ 06:24) >    Findings/  impression:        Support devices: Enteric tube satisfactory position.    Cardiac/ Mediastinum: unremarkable    Lungs/ Pleura: Diminishing left lung opacity/effusion. Stable smaller   right basilar opacity. No pneumothorax.    Skeletal/ soft tissues: Stable    < end of copied text >      RADIOLOGY:   < from: Xray Chest 1 View- PORTABLE-Routine (Xray Chest 1 View- PORTABLE-Routine .) (02.02.24 @ 09:22) >  Impression:    Retrocardiac opacification, worsened.    < end of copied text >  < from: VA Duplex Lower Ext Vein Scan, Bilat (01.22.24 @ 14:52) >  IMPRESSION:  No evidence of deep venous thrombosis in either lower extremity.    < end of copied text >  < from: TTE Echo Complete w/ Contrast w/o Doppler (01.22.24 @ 13:35) >  Summary:   1. Technically difficult and limited study.   2. Endocardial visualization was enhanced with intravenous echo contrast.   3. Left ventricular ejection fraction, by visual estimation, is >55%.   4. Normal global left ventricular systolic function.   5. The left ventricular diastolic function could not be assessed in this   study.  < from: Xray Chest 1 View- PORTABLE-Routine (Xray Chest 1 View- PORTABLE-Routine in AM.) (02.03.24 @ 06:48) >    Impression:    Stable bilateral opacities    < end of copied text >      MEDICATIONS  (STANDING):  albuterol    90 MICROgram(s) HFA Inhaler 2 Puff(s) Inhalation two times a day  albuterol/ipratropium for Nebulization 3 milliLiter(s) Nebulizer four times a day  artificial  tears Solution 1 Drop(s) Both EYES two times a day  calcium carbonate   1250 mG (OsCal) 1 Tablet(s) Oral two times a day  caspofungin IVPB      chlorhexidine 2% Cloths 1 Application(s) Topical daily  dextrose 5%. 1000 milliLiter(s) (75 mL/Hr) IV Continuous <Continuous>  enoxaparin Injectable 50 milliGRAM(s) SubCutaneous every 12 hours  gabapentin 300 milliGRAM(s) Oral every 12 hours  levETIRAcetam  Solution 500 milliGRAM(s) Oral every 12 hours  melatonin 3 milliGRAM(s) Oral at bedtime  meropenem  IVPB 1000 milliGRAM(s) IV Intermittent every 8 hours  metoprolol tartrate 25 milliGRAM(s) Oral two times a day  midodrine. 20 milliGRAM(s) Oral three times a day  norepinephrine Infusion 0.05 MICROgram(s)/kG/Min (4.9 mL/Hr) IV Continuous <Continuous>  pantoprazole  Injectable 40 milliGRAM(s) IV Push every 24 hours  polyethylene glycol 3350 17 Gram(s) Oral at bedtime  raloxifene 60 milliGRAM(s) Oral daily  scopolamine 1 mG/72 Hr(s) Patch 1 Patch Transdermal every 72 hours  senna 2 Tablet(s) Oral at bedtime  silver sulfADIAZINE 1% Cream 1 Application(s) Topical two times a day    MEDICATIONS  (PRN):  acetaminophen     Tablet .. 650 milliGRAM(s) Oral every 6 hours PRN Temp greater or equal to 38C (100.4F), Mild Pain (1 - 3)  acetaminophen     Tablet .. 650 milliGRAM(s) Oral once PRN Temp greater or equal to 38.5C (101.3F)  aluminum hydroxide/magnesium hydroxide/simethicone Suspension 30 milliLiter(s) Oral every 4 hours PRN Dyspepsia  ondansetron Injectable 4 milliGRAM(s) IV Push every 8 hours PRN Nausea and/or Vomiting  sodium chloride 0.65% Nasal 1 Spray(s) Both Nostrils daily PRN Nasal Congestion

## 2024-02-05 NOTE — CONSULT NOTE ADULT - CONSULT REQUESTED DATE/TIME
05-Feb-2024 14:36
21-Jan-2024 11:01
21-Jan-2024 06:31
05-Feb-2024 00:00
26-Jan-2024 09:41
21-Jan-2024 12:24
21-Jan-2024 14:22
04-Feb-2024 12:20

## 2024-02-05 NOTE — CONSULT NOTE ADULT - PROBLEM SELECTOR RECOMMENDATION 2
- c/w IV caspofungin given elevated fungitell  - c/w IV meropenem, high risk, monitor WBC, hemodynamics

## 2024-02-05 NOTE — SWALLOW BEDSIDE ASSESSMENT ADULT - SLP GENERAL OBSERVATIONS
pt received in bed awake +generalized weakness in no apparent pain. +HFNC 60L/min, 85% O2, +NRB mask +[facility] aide at bedside pt received in bed awake +generalized weakness in no apparent pain. +HFNC 60L/min, 85% O2, +15L NRB mask; +[facility] aide at bedside

## 2024-02-05 NOTE — PROGRESS NOTE ADULT - ASSESSMENT
ASSESSMENT  58F w/ PMHx Down Syndrome, nonverbal at baseline, Hypothyroidism, Cerebral palsy and Seizure Disorders presents to the ED from nursing facility/group home presented to ED for respiratory distress and high grade fever.     IMPRESSION  #Fever    2/1 BCX NGTD     2/1 UCX   >=3 organisms. Probable collection contamination.    1/31 BCX NG    Rapid RVP Result: NotDetec (02-04-24 @ 10:28)    MRSA PCR Result.: Negative (02-03-24 @ 21:32)     UA without significant pyuria   Procalcitonin, Serum: 0.22 (02-01-24 @ 16:00)--> Procalcitonin, Serum: 0.15 (02-03-24 @ 11:13), downtrending ; unremarkable   MRSA PCR Result.: Negative (01-22-24 @ 05:30)  < from: Xray Chest 1 View-PORTABLE IMMEDIATE (Xray Chest 1 View-PORTABLE IMMEDIATE .) (02.01.24 @ 03:02) >  Bibasal opacities without significant change.   #Acute hypoxic respiratory failure- Gram Negative pneumonia   #1/20 BCX 1/4 bottles : Staphylococcus hominis- contaminant   Repeat CX NG   #Severe Sepsis on admission  #RSV + 1/21  #Elevated fungitell   Fungitell: 325 (01-20-24 @ 21:23)  Fungitell: 138 (11-07-23 @ 08:08)  Fungitell: 115 (11-02-23 @ 11:40)  Fungitell: >500 pg/mL (10.17.23 @ 17:20) s/p empiric caspo   #Full thickness ulcer right heel- Appreciate burn/podiatry evaluation.  #History of Right planter foot ulcers - two full thickness ulcers - serous drainage with mild erythema with OM  - MR Foot No Cont, Right (10.16.23 @ 21:51): IMPRESSION: 1.  Limited exam. 2.  Osteomyelitis of the first metatarsal stump. 3.  Osteomyelitis of the second toe distal phalanx.  - s/p excidional debridement to and including bone 1st metatarsal with partial 2nd digit amputation - 1st metatarsal head resected - Wound Cx Proteus ESBL   #CKD 2-3 Creatinine: 0.7 mg/dL (02.02.24 @ 04:59)      #History of buttock ulcer  #Down syndrome/Cerebral Palsy     RECOMMENDATIONS  - This is a preliminary incomplete pended note, all final recommendations to follow after interview and examination of the patient.   - ADD Caspo 70mg x1 and 50mg q24h IV , hx unclear elevated fungitell. No clear source  - Continue meropenem 1g q8h IV  - Repeat fungitell , send histoplasma Ab serum   - CT CHest, AP when stable    If any questions, please send a message or call on [employer] Teams  Please continue to update ID with any pertinent new laboratory or radiographic findings.       ASSESSMENT  58F w/ PMHx Down Syndrome, nonverbal at baseline, Hypothyroidism, Cerebral palsy and Seizure Disorders presents to the ED from nursing facility/group home presented to ED for respiratory distress and high grade fever.     IMPRESSION  #Fever  Unclear source     2/1 BCX NGTD     2/1 UCX   >=3 organisms. Probable collection contamination.    1/31 BCX NG    Rapid RVP Result: NotDetec (02-04-24 @ 10:28)    MRSA PCR Result.: Negative (02-03-24 @ 21:32)     UA without significant pyuria   Procalcitonin, Serum: 0.22 (02-01-24 @ 16:00)--> Procalcitonin, Serum: 0.15 (02-03-24 @ 11:13), downtrending ; unremarkable   MRSA PCR Result.: Negative (01-22-24 @ 05:30)  < from: Xray Chest 1 View-PORTABLE IMMEDIATE (Xray Chest 1 View-PORTABLE IMMEDIATE .) (02.01.24 @ 03:02) >  Bibasal opacities without significant change.   #Acute hypoxic respiratory failure- Gram Negative pneumonia   #1/20 BCX 1/4 bottles : Staphylococcus hominis- contaminant   Repeat CX NG   #Severe Sepsis on admission  #RSV + 1/21  #Elevated fungitell   Fungitell: 325 (01-20-24 @ 21:23)  Fungitell: 138 (11-07-23 @ 08:08)  Fungitell: 115 (11-02-23 @ 11:40)  Fungitell: >500 pg/mL (10.17.23 @ 17:20) s/p empiric caspo   #Full thickness ulcer right heel- Appreciate burn/podiatry evaluation.  #History of Right planter foot ulcers - two full thickness ulcers - serous drainage with mild erythema with OM  - MR Foot No Cont, Right (10.16.23 @ 21:51): IMPRESSION: 1.  Limited exam. 2.  Osteomyelitis of the first metatarsal stump. 3.  Osteomyelitis of the second toe distal phalanx.  - s/p excidional debridement to and including bone 1st metatarsal with partial 2nd digit amputation - 1st metatarsal head resected - Wound Cx Proteus ESBL   #CKD 2-3 Creatinine: 0.7 mg/dL (02.02.24 @ 04:59)      #History of buttock ulcer  #Down syndrome/Cerebral Palsy     RECOMMENDATIONS  - ADD Caspo 70mg x1 and 50mg q24h IV , hx unclear elevated fungitell. No clear source  - Continue meropenem 1g q8h IV  - Repeat fungitell , send histoplasma Ab serum   - CT CHest, AP when stable    If any questions, please send a message or call on [employer] Teams  Please continue to update ID with any pertinent new laboratory or radiographic findings.

## 2024-02-05 NOTE — SWALLOW BEDSIDE ASSESSMENT ADULT - COMMENTS
Pt s/p FEES 1/30, recs for NPO w/ NGT.  CXR 2/5-> Diminishing left lung opacity/effusion. Stable smaller R basilar opacity. Pt s/p FEES 1/30, recs for NPO w/ NGT.  CXR 2/5-> Diminishing left lung opacity/effusion. Stable smaller R basilar opacity.  GI c/s for possible g-tube placement, "will consider PEG placement after resolution of the acute medical issues"

## 2024-02-05 NOTE — CONSULT NOTE ADULT - PROBLEM SELECTOR RECOMMENDATION 9
- nonverbal at baseline  - per [facility] staff at bedside, patient would eat by herself (pureed diet), occasionally blow kisses or pat people's hands, but could not verbalize; states she came from [place] to [facility]  - is under [place] [facility]

## 2024-02-05 NOTE — CONSULT NOTE ADULT - SUBJECTIVE AND OBJECTIVE BOX
Podiatry Consult Note    Subjective:  TEA ECHAVARRIA  is a 58 yr/o female who was seen bedside today with attending Dr. Everett (05 Feb 2024 13:27).    HPI:  58F w/ PMHx Down Syndrome, nonverbal at baseline, Hypothyroidism, Cerebral palsy and Seizure Disorders presents to the ED from nursing facility/group home ([facility]) presented to ED for respiratory distress and high grade fever. Patient found to be septic on admission. As per aide Janette at bedside, patient had been coughing and congested for 3x days. Denies fevers, chills, n/v/d or pain prior to admission. Patient is on a puree diet at baseline.    Vitals: Temp 104.5F (rectal), /74, , RR 24, SpO2 100% on BiPAP    Labs: Hgb 9.5 (previously 11.1), Platelet 422, INR 1.43, VBG Lactate 2.1    Imaging: CXR shows possible RML infiltrate    In the ED:  - s/p Cefepime 2g IV x1  - s/p Levaquin 750mg IV x1  - s/p 2L LR bolus    Admitted to SDU for management of acute respiratory distress 2/2 CAP/Aspiration PNA (20 Jan 2024 18:22)      Past Medical History and Surgical History  PAST MEDICAL & SURGICAL HISTORY:  Down syndrome      Osteoporosis      Mild anemia      Neuropathy      S/P debridement  of R hip on 3/2/21           Review of Systems:  [X] Ten point review of systems is otherwise negative except as noted     Objective:  Vital Signs Last 24 Hrs  T(C): 36.1 (05 Feb 2024 11:00), Max: 38.4 (04 Feb 2024 16:00)  T(F): 97 (05 Feb 2024 11:00), Max: 101.1 (04 Feb 2024 16:00)  HR: 93 (05 Feb 2024 11:00) (66 - 112)  BP: 105/64 (05 Feb 2024 11:00) (95/55 - 108/55)  BP(mean): 79 (05 Feb 2024 11:00) (73 - 79)  RR: 20 (05 Feb 2024 11:00) (18 - 20)  SpO2: 96% (05 Feb 2024 11:00) (90% - 100%)    Parameters below as of 05 Feb 2024 11:00  Patient On (Oxygen Delivery Method): nasal cannula, high flow                            9.7    19.80 )-----------( 426      ( 05 Feb 2024 05:43 )             32.6                 02-05    153<H>  |  105  |  23<H>  ----------------------------<  213<H>  3.3<L>   |  31  |  0.6<L>    Ca    8.7      05 Feb 2024 05:43  Mg     2.1     02-05    TPro  6.3  /  Alb  2.9<L>  /  TBili  0.3  /  DBili  x   /  AST  18  /  ALT  15  /  AlkPhos  91  02-05        Physical Exam - Lower Extremity Focused:   Derm: Full thickness ulceration to R heel with necrotic wound base. Left foot superficial wounds no signs of infection  Vascular: DP and PT Pulses Diminished; Foot is Warm to Warm to the touch; Capillary Refill Time < 3 Seconds;    Neuro: Protective Sensation Diminished / Moderately Neuropathic   MSK: Pain On Palpation at Wound Site     Assessment:  Ulceration - Right Heel  Superficial Wounds - Left Foot    Plan:  Chart reviewed and Patient evaluated. All Questions and Concerns Addressed and Answered  XR Imaging  Foot; Reviewed.   Local Wound Care;   -Right: Betadine, gauze, abd, kerlix secured with tape.   -Left: Allevyn Pad   Weight Bearing Status; WBAT  No surgical intervention at this time; will continue with local wound care.   Discussed Plan w/ Dr Everett     Podiatry

## 2024-02-05 NOTE — PROGRESS NOTE ADULT - SUBJECTIVE AND OBJECTIVE BOX
Patient is a 58y old  Female who presents with a chief complaint of Respiratory Distress (04 Feb 2024 16:10)        Over Night Events: Tmax over last 24hrs, 101.7, Levo was restarted on 0.05,     ROS:     All ROS are negative except HPI     PHYSICAL EXAM    ICU Vital Signs Last 24 Hrs  T(C): 36.2 (05 Feb 2024 07:21), Max: 38.7 (04 Feb 2024 12:00)  T(F): 97.2 (05 Feb 2024 07:21), Max: 101.7 (04 Feb 2024 12:00)  HR: 85 (05 Feb 2024 07:21) (66 - 122)  BP: 108/55 (05 Feb 2024 07:21) (95/55 - 108/55)  BP(mean): 74 (05 Feb 2024 07:21) (73 - 76)  ABP: --  ABP(mean): --  RR: 18 (05 Feb 2024 07:21) (18 - 20)  SpO2: 97% (05 Feb 2024 07:21) (90% - 100%)    O2 Parameters below as of 05 Feb 2024 07:21  Patient On (Oxygen Delivery Method): nasal cannula, high flow        CONSTITUTIONAL:  Alert    CARDIAC:   Normal rate,   Regular rhythm.    No edema    RESPIRATORY:   Rhonchi BL    GASTROINTESTINAL:  Abdomen soft,   Non-tender    MUSCULOSKELETAL:   Range of motion is not limited,  No clubbing, cyanosis    NEUROLOGICAL:   Alert, non verbal at baseline    SKIN:   Skin normal color for race,   Warm and dry and intact.   No evidence of rash.      02-04-24 @ 07:01  -  02-05-24 @ 07:00  --------------------------------------------------------  IN:    Enteral Tube Flush: 450 mL    Nepro with Carb Steady: 540 mL    Norepinephrine: 54.5 mL  Total IN: 1044.5 mL    OUT:    Blood Loss (mL): 0 mL    Oral Fluid: 0 mL    Voided (mL): 1400 mL  Total OUT: 1400 mL    Total NET: -355.5 mL          LABS:                            9.7    19.80 )-----------( 426      ( 05 Feb 2024 05:43 )             32.6                                               02-04    145  |  104  |  20  ----------------------------<  159<H>  3.4<L>   |  31  |  0.5<L>    Ca    8.5      04 Feb 2024 06:46  Mg     2.0     02-04    TPro  6.0  /  Alb  2.7<L>  /  TBili  0.3  /  DBili  x   /  AST  16  /  ALT  18  /  AlkPhos  94  02-04                                             Urinalysis Basic - ( 04 Feb 2024 06:46 )    Color: x / Appearance: x / SG: x / pH: x  Gluc: 159 mg/dL / Ketone: x  / Bili: x / Urobili: x   Blood: x / Protein: x / Nitrite: x   Leuk Esterase: x / RBC: x / WBC x   Sq Epi: x / Non Sq Epi: x / Bacteria: x                                                  LIVER FUNCTIONS - ( 04 Feb 2024 06:46 )  Alb: 2.7 g/dL / Pro: 6.0 g/dL / ALK PHOS: 94 U/L / ALT: 18 U/L / AST: 16 U/L / GGT: x                                                  Culture - Blood (collected 03 Feb 2024 11:13)  Source: .Blood None  Preliminary Report (04 Feb 2024 20:01):    No growth at 24 hours                                                                                           MEDICATIONS  (STANDING):  albuterol    90 MICROgram(s) HFA Inhaler 2 Puff(s) Inhalation two times a day  albuterol/ipratropium for Nebulization 3 milliLiter(s) Nebulizer four times a day  apixaban 5 milliGRAM(s) Oral two times a day  artificial  tears Solution 1 Drop(s) Both EYES two times a day  calcium carbonate   1250 mG (OsCal) 1 Tablet(s) Oral two times a day  chlorhexidine 2% Cloths 1 Application(s) Topical daily  gabapentin 300 milliGRAM(s) Oral every 12 hours  levETIRAcetam  Solution 500 milliGRAM(s) Oral every 12 hours  melatonin 3 milliGRAM(s) Oral at bedtime  meropenem  IVPB 1000 milliGRAM(s) IV Intermittent every 8 hours  metoprolol tartrate 25 milliGRAM(s) Oral two times a day  midodrine. 20 milliGRAM(s) Oral three times a day  norepinephrine Infusion 0.05 MICROgram(s)/kG/Min (4.9 mL/Hr) IV Continuous <Continuous>  pantoprazole  Injectable 40 milliGRAM(s) IV Push every 24 hours  polyethylene glycol 3350 17 Gram(s) Oral at bedtime  raloxifene 60 milliGRAM(s) Oral daily  scopolamine 1 mG/72 Hr(s) Patch 1 Patch Transdermal every 72 hours  senna 2 Tablet(s) Oral at bedtime  silver sulfADIAZINE 1% Cream 1 Application(s) Topical two times a day  vancomycin  IVPB      vancomycin  IVPB 500 milliGRAM(s) IV Intermittent every 12 hours    MEDICATIONS  (PRN):  acetaminophen     Tablet .. 650 milliGRAM(s) Oral once PRN Temp greater or equal to 38.5C (101.3F)  acetaminophen     Tablet .. 650 milliGRAM(s) Oral every 6 hours PRN Temp greater or equal to 38C (100.4F), Mild Pain (1 - 3)  aluminum hydroxide/magnesium hydroxide/simethicone Suspension 30 milliLiter(s) Oral every 4 hours PRN Dyspepsia  ondansetron Injectable 4 milliGRAM(s) IV Push every 8 hours PRN Nausea and/or Vomiting  sodium chloride 0.65% Nasal 1 Spray(s) Both Nostrils daily PRN Nasal Congestion      New X-rays reviewed:                                                                                  ECHO    CXR interpreted by me:       Patient is a 58y old  Female who presents with a chief complaint of Respiratory Distress (04 Feb 2024 16:10)        Over Night Events: Remains critically ill on HFNCO2.  on Levophed.      ROS:     All ROS are negative except HPI     PHYSICAL EXAM    ICU Vital Signs Last 24 Hrs  T(C): 36.2 (05 Feb 2024 07:21), Max: 38.7 (04 Feb 2024 12:00)  T(F): 97.2 (05 Feb 2024 07:21), Max: 101.7 (04 Feb 2024 12:00)  HR: 85 (05 Feb 2024 07:21) (66 - 122)  BP: 108/55 (05 Feb 2024 07:21) (95/55 - 108/55)  BP(mean): 74 (05 Feb 2024 07:21) (73 - 76)  ABP: --  ABP(mean): --  RR: 18 (05 Feb 2024 07:21) (18 - 20)  SpO2: 97% (05 Feb 2024 07:21) (90% - 100%)    O2 Parameters below as of 05 Feb 2024 07:21  Patient On (Oxygen Delivery Method): nasal cannula, high flow        CONSTITUTIONAL:  Alert    CARDIAC:   Normal rate,   Regular rhythm.    No edema    RESPIRATORY:   Rhonchi BL    GASTROINTESTINAL:  Abdomen soft,   Non-tender    MUSCULOSKELETAL:   Range of motion is not limited,  No clubbing, cyanosis    NEUROLOGICAL:   Alert, non verbal at baseline    SKIN:   Skin normal color for race,   Warm and dry and intact.   No evidence of rash.      02-04-24 @ 07:01  -  02-05-24 @ 07:00  --------------------------------------------------------  IN:    Enteral Tube Flush: 450 mL    Nepro with Carb Steady: 540 mL    Norepinephrine: 54.5 mL  Total IN: 1044.5 mL    OUT:    Blood Loss (mL): 0 mL    Oral Fluid: 0 mL    Voided (mL): 1400 mL  Total OUT: 1400 mL    Total NET: -355.5 mL          LABS:                            9.7    19.80 )-----------( 426      ( 05 Feb 2024 05:43 )             32.6                                               02-04    145  |  104  |  20  ----------------------------<  159<H>  3.4<L>   |  31  |  0.5<L>    Ca    8.5      04 Feb 2024 06:46  Mg     2.0     02-04    TPro  6.0  /  Alb  2.7<L>  /  TBili  0.3  /  DBili  x   /  AST  16  /  ALT  18  /  AlkPhos  94  02-04                                             Urinalysis Basic - ( 04 Feb 2024 06:46 )    Color: x / Appearance: x / SG: x / pH: x  Gluc: 159 mg/dL / Ketone: x  / Bili: x / Urobili: x   Blood: x / Protein: x / Nitrite: x   Leuk Esterase: x / RBC: x / WBC x   Sq Epi: x / Non Sq Epi: x / Bacteria: x                                                  LIVER FUNCTIONS - ( 04 Feb 2024 06:46 )  Alb: 2.7 g/dL / Pro: 6.0 g/dL / ALK PHOS: 94 U/L / ALT: 18 U/L / AST: 16 U/L / GGT: x                                                  Culture - Blood (collected 03 Feb 2024 11:13)  Source: .Blood None  Preliminary Report (04 Feb 2024 20:01):    No growth at 24 hours                                                                                           MEDICATIONS  (STANDING):  albuterol    90 MICROgram(s) HFA Inhaler 2 Puff(s) Inhalation two times a day  albuterol/ipratropium for Nebulization 3 milliLiter(s) Nebulizer four times a day  apixaban 5 milliGRAM(s) Oral two times a day  artificial  tears Solution 1 Drop(s) Both EYES two times a day  calcium carbonate   1250 mG (OsCal) 1 Tablet(s) Oral two times a day  chlorhexidine 2% Cloths 1 Application(s) Topical daily  gabapentin 300 milliGRAM(s) Oral every 12 hours  levETIRAcetam  Solution 500 milliGRAM(s) Oral every 12 hours  melatonin 3 milliGRAM(s) Oral at bedtime  meropenem  IVPB 1000 milliGRAM(s) IV Intermittent every 8 hours  metoprolol tartrate 25 milliGRAM(s) Oral two times a day  midodrine. 20 milliGRAM(s) Oral three times a day  norepinephrine Infusion 0.05 MICROgram(s)/kG/Min (4.9 mL/Hr) IV Continuous <Continuous>  pantoprazole  Injectable 40 milliGRAM(s) IV Push every 24 hours  polyethylene glycol 3350 17 Gram(s) Oral at bedtime  raloxifene 60 milliGRAM(s) Oral daily  scopolamine 1 mG/72 Hr(s) Patch 1 Patch Transdermal every 72 hours  senna 2 Tablet(s) Oral at bedtime  silver sulfADIAZINE 1% Cream 1 Application(s) Topical two times a day  vancomycin  IVPB      vancomycin  IVPB 500 milliGRAM(s) IV Intermittent every 12 hours    MEDICATIONS  (PRN):  acetaminophen     Tablet .. 650 milliGRAM(s) Oral once PRN Temp greater or equal to 38.5C (101.3F)  acetaminophen     Tablet .. 650 milliGRAM(s) Oral every 6 hours PRN Temp greater or equal to 38C (100.4F), Mild Pain (1 - 3)  aluminum hydroxide/magnesium hydroxide/simethicone Suspension 30 milliLiter(s) Oral every 4 hours PRN Dyspepsia  ondansetron Injectable 4 milliGRAM(s) IV Push every 8 hours PRN Nausea and/or Vomiting  sodium chloride 0.65% Nasal 1 Spray(s) Both Nostrils daily PRN Nasal Congestion      New X-rays reviewed:                                                                                  ECHO

## 2024-02-05 NOTE — PROGRESS NOTE ADULT - ASSESSMENT
58F w/ PMHx Down Syndrome, nonverbal at baseline, DVT on eliquis 5mg BID,  Hypothyroidism, aspiration pneumonia in prev admissions, Cerebral palsy and Seizure Disorders presents to the ED from nursing facility/group home ([facility]) presented to ED for respiratory distress and high grade fever. Patient found to be septic on admission. Admitted for management of acute respiratory distress likely 2/2 CAP or aspiration PNA.    #Sepsis POA (Febrile, Tachycardic, Leukocytosis)  #Acute Hypoxic Respiratory Failure secondary to Aspiration PNA / RSV  #Hx of Dysphagia - Puree Diet at baseline  - On admission: VBG Lactate 2.1 improved to wnl, procal 0.04 pH wnl and CO2 mildly elevated 61  - MRSA negative   - BCx (1/20): Staph hominis repeat BCx have been NGTD  - UCx: Neg  - RVP: RSV detected  - MRSA swab neg, urine strep neg, urine legionella neg  - D-dimer 605 and LE duplex neg  - CXR pending Bibasal opacities without significant change.  - Fungitell chronically positive, received Caspofungin on previous admission  - Repeat Flu/Covid/MRSA nares negative  - C/w Meropenem 1g IV q8hr per ID  - Vanc dc'd for now as MRSA negative, f/u ID  - C/w Tube Feeding by NGT, will need PEG eval when respiratory status improves  -  Mrs. Carson [phone] to be called as patient will likely need PEG consent. Emergency contact contacted over weekend, see chart note  - F/u Palliative care    #Hypotension in setting of Septic Shock  - Monitor BP as patient is spiking fevers  - Keep MAP >65  - C/w Midodrine 20mg q8  - On low dose levophed    #Elevated Troponin (Stabilized)  #Sinus Tachycardia  - Troponins: 25>37>35>35  - EKG shows sinus tachycardia, repeated  - Could likely be from hypovolemia/infectious state  - C/w Lopressor 25mg BID w/ hold parameters    #Acute on Chronic Normocytic Anemia (Stable)  #Iron Deficiency  - On admission: HgB 8.9 (previously 9.5)  - No evidence of hemorrhage  - B12 and Folate WNL normal  - Total Iron 15, Iron Saturation 6%, Ferritin 128 reflecting potential TIFFANI  - Hold off on iron supplementation in setting of suspected infection  - Keep HgB >7    #Hx of DVT of L Common Femoral Vein  - LE Duplex negative for DVT  - C/w Eliquis 5mg BID    #Seizure Disorder  - C/w Keppra 500mg BID     #Hx of Right Foot OM (1st Toe Stump and 2nd Distal Phalanx)  #Hx of Multiple Left Foot DTIs  #Hx of Sacral Wound  - Previous wound cultures positive for Proteus and ESBL E coli  - Patient underwent excisional debridement of ulcer of right foot (including 1st metatarsal) and partial amputation 2nd toe on previous admissions  - No acute surgical interventions from podiatry and burn  - C/w q2hr repositioning  - C/w local wound care  - Recall Podiatry for Foot ulcers    #Down Syndrome  #Cerebral Palsy  #Non-Verbal  #Post-Menopausal Bone Loss  - C/w Gabapentin 300mg BID  - C/w Raloxifene 60mg QD    #Misc  #Code Status: Full Code  #DVT ppx: Eliquis   #GI prophylaxis: Protonix  #Diet: Tube Feeds  #Disposition: SDU 58F w/ PMHx Down Syndrome, nonverbal at baseline, DVT on eliquis 5mg BID,  Hypothyroidism, aspiration pneumonia in prev admissions, Cerebral palsy and Seizure Disorders presents to the ED from nursing facility/group home ([facility]) presented to ED for respiratory distress and high grade fever. Patient found to be septic on admission. Admitted for management of acute respiratory distress likely 2/2 CAP or aspiration PNA.    #Sepsis POA (Febrile, Tachycardic, Leukocytosis)  #Acute Hypoxic Respiratory Failure secondary to Aspiration PNA / RSV  #Hx of Dysphagia - Puree Diet at baseline  - On admission: VBG Lactate 2.1 improved to wnl, procal 0.04 pH wnl and CO2 mildly elevated 61  - MRSA negative   - BCx (1/20): Staph hominis repeat BCx have been NGTD  - UCx: Neg  - RVP: RSV detected  - MRSA swab neg, urine strep neg, urine legionella neg  - D-dimer 605 and LE duplex neg  - CXR pending Bibasal opacities without significant change.  - Fungitell chronically positive, received Caspofungin on previous admission  - Repeat Flu/Covid/MRSA nares negative  - C/w Meropenem 1g IV q8hr per ID  - Vanc dc'd for now as MRSA negative, f/u ID  - Hold tube feeds, low risk for intubation, will need PEG eval when respiratory status improves  -  Mrs. Carson [phone] to be called as patient will likely need PEG consent. Emergency contact contacted over weekend, see chart note  - F/u Palliative care    #Hypotension in setting of Septic Shock  - Monitor BP as patient is spiking fevers  - Keep MAP >65  - C/w Midodrine 20mg q8  - On low dose levophed    #Elevated Troponin (Stabilized)  #Sinus Tachycardia  - Troponins: 25>37>35>35  - EKG shows sinus tachycardia, repeated  - Could likely be from hypovolemia/infectious state  - C/w Lopressor 25mg BID w/ hold parameters    #Acute on Chronic Normocytic Anemia (Stable)  #Iron Deficiency  - On admission: HgB 8.9 (previously 9.5)  - No evidence of hemorrhage  - B12 and Folate WNL normal  - Total Iron 15, Iron Saturation 6%, Ferritin 128 reflecting potential TIFFANI  - Hold off on iron supplementation in setting of suspected infection  - Keep HgB >7    #Hx of DVT of L Common Femoral Vein  - LE Duplex negative for DVT  - C/w Eliquis 5mg BID    #Seizure Disorder  - C/w Keppra 500mg BID     #Hx of Right Foot OM (1st Toe Stump and 2nd Distal Phalanx)  #Hx of Multiple Left Foot DTIs  #Hx of Sacral Wound  - Previous wound cultures positive for Proteus and ESBL E coli  - Patient underwent excisional debridement of ulcer of right foot (including 1st metatarsal) and partial amputation 2nd toe on previous admissions  - No acute surgical interventions from podiatry and burn  - C/w q2hr repositioning  - C/w local wound care  - Recall Podiatry for Foot ulcers    #Down Syndrome  #Cerebral Palsy  #Non-Verbal  #Post-Menopausal Bone Loss  - C/w Gabapentin 300mg BID  - C/w Raloxifene 60mg QD    #Misc  #Code Status: Full Code  #DVT ppx: Eliquis   #GI prophylaxis: Protonix  #Diet: Hold tube feeds  #Disposition: SDU

## 2024-02-05 NOTE — CONSULT NOTE ADULT - ATTENDING COMMENTS
I have personally seen and examined patient.  I have fully participated in patient care.  I have amended the note when necessary and I agree with the history, physical exam, assessment and plan documented by the resident.      Agree with above note     will continue to follow patient      No surgery at this time
The patient was seen. Agree with above.

## 2024-02-05 NOTE — PROGRESS NOTE ADULT - ASSESSMENT
IMPRESSION:    Acute hypoxemic respiratory failure on HHFNC and NRB   Likely aspiration pneumonia   Sepsis POA  Septic shock  on Levo   Recurrent aspiration pneumonia/ prior intubation  HO GI bleed  HO OM  HO recent duodenal perforation   HO polymicrobial bacteremia   H/o CP, DS  H/o seizures    PLAN:    CNS:  Avoid CNS Depressant, AED. MS at baseline.     HEENT: Oral care.  ET care     PULMONARY: HOB at 45 degrees.  Aspiration precaution. On HFNC; alternate with BIPAP. Keep spo2 more than 92%. Repeat xray with left consolidation/atelectasis, aggressive pulm toilet, frequent suctioning    CARDIOVASCULAR: Keep MAP more than 65mmhg. Avoid overload. Wean levophed as able. C/w midodrine 20mg, Check Bedside IVC. Bolus 500cc LR    GI: Protonix. NGT for feeds; Hold for now. High risk of aspiration, speech and swallow recs noted    INFECTIOUS DISEASE:  Check blood, sputum culture. Procal noted. Nasal MRSA negative, DC vanc.  c/w Meropenem per ID    HEMATOLOGICAL: change to Lovenox therapeutic.      ENDOCRINE:  Follow up FS.  Insulin protocol if needed.    MUSCULOSKELETAL: Bedrest.  Off loading.  Wound care.      Prognosis very poor.     SDU.   IMPRESSION:    Acute hypoxemic respiratory failure on HHFNC and NRB   Likely aspiration pneumonia   Sepsis POA  Septic shock  on Levo   Recurrent aspiration pneumonia/ prior intubation  HO GI bleed  HO OM  HO recent duodenal perforation   HO polymicrobial bacteremia   H/o CP, DS  H/o seizures    PLAN:    CNS:  Avoid CNS Depressant, AED. MS at baseline.     HEENT: Oral care.  ET care     PULMONARY: HOB at 45 degrees.  Aspiration precaution. On HFNC; alternate with BIPAP. Keep spo2 more than 92%. Repeat xray with left consolidation/atelectasis, aggressive pulm toilet, frequent suctioning    CARDIOVASCULAR: Keep MAP more than 65mmhg. Avoid overload. Wean levophed as able. C/w midodrine 20mg, Check Bedside IVC. Bolus 500cc LR    GI: Protonix. NGT for feeds; Hold for now. High risk of aspiration, speech and swallow recs noted, Will need PEG when more stable, GI following    INFECTIOUS DISEASE:  Check blood, sputum culture. Procal noted. Nasal MRSA negative, DC vanc.  c/w Meropenem per ID    HEMATOLOGICAL: change to Lovenox therapeutic.      ENDOCRINE:  Follow up FS.  Insulin protocol if needed.    MUSCULOSKELETAL: Bedrest.  Off loading.  Wound care.      Prognosis very poor.     SDU.   IMPRESSION:    Acute hypoxemic respiratory failure on HHFNC and NRB   Likely aspiration pneumonia   Sepsis POA  Septic shock  on Levophed   Recurrent aspiration pneumonia/ prior intubation  HO GI bleed  HO OM  HO recent duodenal perforation   HO polymicrobial bacteremia   H/o CP, DS  H/o seizures    PLAN:    CNS:  Avoid CNS Depressant, AED. MS at baseline.     HEENT: Oral care.       PULMONARY: HOB at 45 degrees.  Aspiration precaution. On HFNC; alternate with BIPAP. Keep spo2 more than 92%. Repeat xray with left consolidation/atelectasis, aggressive pulm toilet, frequent suctioning    CARDIOVASCULAR: Keep MAP more than 6mmhg. Avoid overload. Wean levophed as able. C/w midodrine 20mg.  Goal directed fluid resuscitation     GI: Protonix. NGT for feeds; Hold for now. High risk of aspiration, speech and swallow recs noted, Will need PEG when more stable, GI following    INFECTIOUS DISEASE:  Check blood, sputum culture. Procal noted. Nasal MRSA negative, DC vanc.  c/w Meropenem per ID    HEMATOLOGICAL: change to Lovenox therapeutic.  Monitor CBC     ENDOCRINE:  Follow up FS.  Insulin protocol if needed.    MUSCULOSKELETAL: Bedrest.  Off loading.  Wound care.      Prognosis very poor.     SDU.   IMPRESSION:    Acute hypoxemic respiratory failure on HHFNC and NRB   Likely aspiration pneumonia   Sepsis POA  Septic shock  on Levophed   Recurrent aspiration pneumonia/ prior intubation  HO GI bleed  HO OM  HO recent duodenal perforation   HO polymicrobial bacteremia   H/o CP, DS  H/o seizures    PLAN:    CNS:  Avoid CNS Depressant, AED. MS at baseline.     HEENT: Oral care.       PULMONARY: HOB at 45 degrees.  Aspiration precaution. On HFNC; alternate with BIPAP. Keep spo2 more than 92%. Repeat xray with left consolidation/atelectasis, aggressive pulm toilet, frequent suctioning.  Might need MV.      CARDIOVASCULAR: Keep MAP more than 6mmhg. Avoid overload. Wean levophed as able. C/w midodrine 20mg.  Goal directed fluid resuscitation     GI: Protonix. NGT for feeds; Hold for now. High risk of aspiration, speech and swallow recs noted, Will need PEG when more stable, GI following    INFECTIOUS DISEASE:  Check blood, sputum culture. Procal noted. Nasal MRSA negative, DC vanc.  c/w Meropenem per ID    HEMATOLOGICAL: change to Lovenox therapeutic.  Monitor CBC     ENDOCRINE:  Follow up FS.  Insulin protocol if needed.    MUSCULOSKELETAL: Bedrest.  Off loading.  Wound care.      Prognosis very poor.     Goals of care.  Palliative care evaluation     SDU.

## 2024-02-06 LAB
ALBUMIN SERPL ELPH-MCNC: 2.5 G/DL — LOW (ref 3.5–5.2)
ALP SERPL-CCNC: 81 U/L — SIGNIFICANT CHANGE UP (ref 30–115)
ALT FLD-CCNC: 16 U/L — SIGNIFICANT CHANGE UP (ref 0–41)
ANION GAP SERPL CALC-SCNC: 10 MMOL/L — SIGNIFICANT CHANGE UP (ref 7–14)
AST SERPL-CCNC: 19 U/L — SIGNIFICANT CHANGE UP (ref 0–41)
BASOPHILS # BLD AUTO: 0.06 K/UL — SIGNIFICANT CHANGE UP (ref 0–0.2)
BASOPHILS NFR BLD AUTO: 0.4 % — SIGNIFICANT CHANGE UP (ref 0–1)
BILIRUB SERPL-MCNC: 0.2 MG/DL — SIGNIFICANT CHANGE UP (ref 0.2–1.2)
BUN SERPL-MCNC: 14 MG/DL — SIGNIFICANT CHANGE UP (ref 10–20)
CALCIUM SERPL-MCNC: 8.7 MG/DL — SIGNIFICANT CHANGE UP (ref 8.4–10.5)
CHLORIDE SERPL-SCNC: 103 MMOL/L — SIGNIFICANT CHANGE UP (ref 98–110)
CO2 SERPL-SCNC: 33 MMOL/L — HIGH (ref 17–32)
CREAT SERPL-MCNC: 0.5 MG/DL — LOW (ref 0.7–1.5)
CULTURE RESULTS: SIGNIFICANT CHANGE UP
EGFR: 109 ML/MIN/1.73M2 — SIGNIFICANT CHANGE UP
EOSINOPHIL # BLD AUTO: 0.3 K/UL — SIGNIFICANT CHANGE UP (ref 0–0.7)
EOSINOPHIL NFR BLD AUTO: 1.8 % — SIGNIFICANT CHANGE UP (ref 0–8)
GLUCOSE SERPL-MCNC: 174 MG/DL — HIGH (ref 70–99)
HCT VFR BLD CALC: 29.5 % — LOW (ref 37–47)
HGB BLD-MCNC: 8.8 G/DL — LOW (ref 12–16)
IMM GRANULOCYTES NFR BLD AUTO: 0.8 % — HIGH (ref 0.1–0.3)
LYMPHOCYTES # BLD AUTO: 1.83 K/UL — SIGNIFICANT CHANGE UP (ref 1.2–3.4)
LYMPHOCYTES # BLD AUTO: 11.2 % — LOW (ref 20.5–51.1)
MAGNESIUM SERPL-MCNC: 2 MG/DL — SIGNIFICANT CHANGE UP (ref 1.8–2.4)
MCHC RBC-ENTMCNC: 23.8 PG — LOW (ref 27–31)
MCHC RBC-ENTMCNC: 29.8 G/DL — LOW (ref 32–37)
MCV RBC AUTO: 79.7 FL — LOW (ref 81–99)
MONOCYTES # BLD AUTO: 0.93 K/UL — HIGH (ref 0.1–0.6)
MONOCYTES NFR BLD AUTO: 5.7 % — SIGNIFICANT CHANGE UP (ref 1.7–9.3)
NEUTROPHILS # BLD AUTO: 13.07 K/UL — HIGH (ref 1.4–6.5)
NEUTROPHILS NFR BLD AUTO: 80.1 % — HIGH (ref 42.2–75.2)
NRBC # BLD: 0 /100 WBCS — SIGNIFICANT CHANGE UP (ref 0–0)
PLATELET # BLD AUTO: 364 K/UL — SIGNIFICANT CHANGE UP (ref 130–400)
PMV BLD: 10.7 FL — HIGH (ref 7.4–10.4)
POTASSIUM SERPL-MCNC: 3.4 MMOL/L — LOW (ref 3.5–5)
POTASSIUM SERPL-SCNC: 3.4 MMOL/L — LOW (ref 3.5–5)
PROT SERPL-MCNC: 5.8 G/DL — LOW (ref 6–8)
RBC # BLD: 3.7 M/UL — LOW (ref 4.2–5.4)
RBC # FLD: 19.7 % — HIGH (ref 11.5–14.5)
SODIUM SERPL-SCNC: 146 MMOL/L — SIGNIFICANT CHANGE UP (ref 135–146)
SPECIMEN SOURCE: SIGNIFICANT CHANGE UP
WBC # BLD: 16.32 K/UL — HIGH (ref 4.8–10.8)
WBC # FLD AUTO: 16.32 K/UL — HIGH (ref 4.8–10.8)

## 2024-02-06 PROCEDURE — 99233 SBSQ HOSP IP/OBS HIGH 50: CPT

## 2024-02-06 PROCEDURE — 99291 CRITICAL CARE FIRST HOUR: CPT

## 2024-02-06 PROCEDURE — 71045 X-RAY EXAM CHEST 1 VIEW: CPT | Mod: 26

## 2024-02-06 PROCEDURE — 71045 X-RAY EXAM CHEST 1 VIEW: CPT | Mod: 26,77,76

## 2024-02-06 RX ORDER — MIDODRINE HYDROCHLORIDE 2.5 MG/1
20 TABLET ORAL THREE TIMES A DAY
Refills: 0 | Status: DISCONTINUED | OUTPATIENT
Start: 2024-02-06 | End: 2024-04-08

## 2024-02-06 RX ORDER — RALOXIFENE HYDROCHLORIDE 60 MG/1
60 TABLET, COATED ORAL DAILY
Refills: 0 | Status: DISCONTINUED | OUTPATIENT
Start: 2024-02-06 | End: 2024-04-08

## 2024-02-06 RX ORDER — ACETAMINOPHEN 500 MG
650 TABLET ORAL EVERY 6 HOURS
Refills: 0 | Status: DISCONTINUED | OUTPATIENT
Start: 2024-02-06 | End: 2024-02-23

## 2024-02-06 RX ORDER — SODIUM CHLORIDE 9 MG/ML
500 INJECTION, SOLUTION INTRAVENOUS ONCE
Refills: 0 | Status: COMPLETED | OUTPATIENT
Start: 2024-02-06 | End: 2024-02-06

## 2024-02-06 RX ORDER — POTASSIUM CHLORIDE 20 MEQ
40 PACKET (EA) ORAL ONCE
Refills: 0 | Status: COMPLETED | OUTPATIENT
Start: 2024-02-06 | End: 2024-02-06

## 2024-02-06 RX ORDER — METOPROLOL TARTRATE 50 MG
25 TABLET ORAL
Refills: 0 | Status: DISCONTINUED | OUTPATIENT
Start: 2024-02-06 | End: 2024-02-07

## 2024-02-06 RX ADMIN — Medication 25 MILLIGRAM(S): at 17:17

## 2024-02-06 RX ADMIN — Medication 3 MILLILITER(S): at 07:46

## 2024-02-06 RX ADMIN — MEROPENEM 100 MILLIGRAM(S): 1 INJECTION INTRAVENOUS at 21:33

## 2024-02-06 RX ADMIN — Medication 3 MILLIGRAM(S): at 21:33

## 2024-02-06 RX ADMIN — GABAPENTIN 300 MILLIGRAM(S): 400 CAPSULE ORAL at 17:18

## 2024-02-06 RX ADMIN — Medication 1 APPLICATION(S): at 17:14

## 2024-02-06 RX ADMIN — SCOPALAMINE 1 PATCH: 1 PATCH, EXTENDED RELEASE TRANSDERMAL at 17:16

## 2024-02-06 RX ADMIN — ENOXAPARIN SODIUM 50 MILLIGRAM(S): 100 INJECTION SUBCUTANEOUS at 17:16

## 2024-02-06 RX ADMIN — CHLORHEXIDINE GLUCONATE 1 APPLICATION(S): 213 SOLUTION TOPICAL at 14:39

## 2024-02-06 RX ADMIN — MEROPENEM 100 MILLIGRAM(S): 1 INJECTION INTRAVENOUS at 15:07

## 2024-02-06 RX ADMIN — CASPOFUNGIN ACETATE 260 MILLIGRAM(S): 7 INJECTION, POWDER, LYOPHILIZED, FOR SOLUTION INTRAVENOUS at 14:38

## 2024-02-06 RX ADMIN — ENOXAPARIN SODIUM 50 MILLIGRAM(S): 100 INJECTION SUBCUTANEOUS at 05:22

## 2024-02-06 RX ADMIN — Medication 1 TABLET(S): at 17:18

## 2024-02-06 RX ADMIN — PANTOPRAZOLE SODIUM 40 MILLIGRAM(S): 20 TABLET, DELAYED RELEASE ORAL at 05:22

## 2024-02-06 RX ADMIN — MIDODRINE HYDROCHLORIDE 20 MILLIGRAM(S): 2.5 TABLET ORAL at 09:07

## 2024-02-06 RX ADMIN — SODIUM CHLORIDE 1000 MILLILITER(S): 9 INJECTION, SOLUTION INTRAVENOUS at 12:25

## 2024-02-06 RX ADMIN — MEROPENEM 100 MILLIGRAM(S): 1 INJECTION INTRAVENOUS at 05:26

## 2024-02-06 RX ADMIN — ALBUTEROL 2 PUFF(S): 90 AEROSOL, METERED ORAL at 07:47

## 2024-02-06 RX ADMIN — LEVETIRACETAM 500 MILLIGRAM(S): 250 TABLET, FILM COATED ORAL at 17:17

## 2024-02-06 RX ADMIN — Medication 1 DROP(S): at 17:14

## 2024-02-06 RX ADMIN — RALOXIFENE HYDROCHLORIDE 60 MILLIGRAM(S): 60 TABLET, COATED ORAL at 17:17

## 2024-02-06 RX ADMIN — Medication 40 MILLIEQUIVALENT(S): at 10:47

## 2024-02-06 RX ADMIN — MIDODRINE HYDROCHLORIDE 20 MILLIGRAM(S): 2.5 TABLET ORAL at 17:18

## 2024-02-06 RX ADMIN — Medication 1 APPLICATION(S): at 05:26

## 2024-02-06 RX ADMIN — Medication 3 MILLILITER(S): at 13:09

## 2024-02-06 RX ADMIN — Medication 1 DROP(S): at 05:26

## 2024-02-06 NOTE — PROGRESS NOTE ADULT - SUBJECTIVE AND OBJECTIVE BOX
HPI: 58F with Down syndrome, nonverbal at baseline, hypothyroidism, CP, and seizure disorder here from [facility] with fever and respiratory distress. Found to be septic on admission, from CAP/aspiration PNA, on IV vanco and meropenem, with course c/b continued fevers, and bacteremia with S. hominis. Also failed FEES, has NGT in place and will likely need PEG. Is requiring HFNC alternating with BiPAP. Is also on midodrine for hypotension. Patient is full code. [facility]iative care consulted for [facility].    INTERVAL EVENTS  2/6/24: patient appears comfortable    ADVANCE DIRECTIVES:    [X ] Full Code [ ] DNR  MOLST  [ ]  Living Will  [ ]   DECISION MAKER(s):  [ ] Health Care Proxy(s)  [ ] Surrogate(s)  [ ] Guardian           Name(s): Phone Number(s): [place] [facility]    BASELINE (I)ADL(s) (prior to admission):  [place]: [ ]Total  [ ] Moderate [ ]Dependent  Palliative Performance Status Version 2:         %    http://npcrc.org/files/news/palliative_performance_scale_ppsv2.pdf    Allergies    No Known Allergies    Intolerances    MEDICATIONS  (STANDING):  albuterol    90 MICROgram(s) HFA Inhaler 2 Puff(s) Inhalation two times a day  albuterol/ipratropium for Nebulization 3 milliLiter(s) Nebulizer four times a day  artificial  tears Solution 1 Drop(s) Both EYES two times a day  calcium carbonate   1250 mG (OsCal) 1 Tablet(s) Oral two times a day  caspofungin IVPB      caspofungin IVPB 50 milliGRAM(s) IV Intermittent every 24 hours  chlorhexidine 2% Cloths 1 Application(s) Topical daily  dextrose 5%. 1000 milliLiter(s) (75 mL/Hr) IV Continuous <Continuous>  enoxaparin Injectable 50 milliGRAM(s) SubCutaneous every 12 hours  gabapentin 300 milliGRAM(s) Oral every 12 hours  levETIRAcetam  Solution 500 milliGRAM(s) Oral every 12 hours  melatonin 3 milliGRAM(s) Oral at bedtime  meropenem  IVPB 1000 milliGRAM(s) IV Intermittent every 8 hours  metoprolol tartrate 25 milliGRAM(s) Oral two times a day  midodrine. 20 milliGRAM(s) Oral three times a day  norepinephrine Infusion 0.05 MICROgram(s)/kG/Min (4.9 mL/Hr) IV Continuous <Continuous>  pantoprazole  Injectable 40 milliGRAM(s) IV Push every 24 hours  polyethylene glycol 3350 17 Gram(s) Oral at bedtime  raloxifene 60 milliGRAM(s) Oral daily  scopolamine 1 mG/72 Hr(s) Patch 1 Patch Transdermal every 72 hours  senna 2 Tablet(s) Oral at bedtime  silver sulfADIAZINE 1% Cream 1 Application(s) Topical two times a day    MEDICATIONS  (PRN):  acetaminophen     Tablet .. 650 milliGRAM(s) Oral once PRN Temp greater or equal to 38.5C (101.3F)  acetaminophen     Tablet .. 650 milliGRAM(s) Oral every 6 hours PRN Temp greater or equal to 38C (100.4F), Mild Pain (1 - 3)  aluminum hydroxide/magnesium hydroxide/simethicone Suspension 30 milliLiter(s) Oral every 4 hours PRN Dyspepsia  ondansetron Injectable 4 milliGRAM(s) IV Push every 8 hours PRN Nausea and/or Vomiting  sodium chloride 0.65% Nasal 1 Spray(s) Both Nostrils daily PRN Nasal Congestion    PRESENT SYMPTOMS: [X ]Unable to obtain due to poor mentation   Source if other than patient:  [ ]Family   [ ]Team     Pain: [ ]yes [ ]no  QOL impact -   Location -                    Aggravating factors -  Quality -  Radiation -  Timing-  Severity (0-10 scale):  Minimal acceptable level (0-10 scale):     CPOT:    https://www.[facility].org/getattachment/[email or web address]/Critical-Care-Pain-Observation-Tool-(CPOT)    PAIN AD Score: 0  http://geriatrictoolkit.[employer].edu/cog/painad.pdf (press ctrl +  left click to view)    Dyspnea:                           [ ]None[ ]Mild [ ]Moderate [ ]Severe     Respiratory Distress Observation Scale (RDOS): 1  A score of 0 to 2 signifies little or no respiratory distress, 3 signifies mild distress, scores 4 to 6 indicate moderate distress, and scores greater than 7 signify severe distress  https://www.[facility].ca/sites/default/files/PDFS/930342-jdetpukplon-vcjnnpoz-bjvrjcughwo-jszup.pdf    Anxiety:                             [ ]None[ ]Mild [ ]Moderate [ ]Severe   Fatigue:                             [ ]None[ ]Mild [ ]Moderate [ ]Severe   Nausea:                             [ ]None[ ]Mild [ ]Moderate [ ]Severe   Loss of appetite:              [ ]None[ ]Mild [ ]Moderate [ ]Severe   Constipation:                    [ ]None[ ]Mild [ ]Moderate [ ]Severe    Other Symptoms:  [ ]All other review of systems negative     Palliative Performance Status Version 2:         %    http://npcrc.org/files/news/palliative_performance_scale_ppsv2.pdf  PHYSICAL EXAM:  Vital Signs Last 24 Hrs  T(C): 38.2 (06 Feb 2024 16:00), Max: 38.2 (06 Feb 2024 16:00)  T(F): 100.7 (06 Feb 2024 16:00), Max: 100.7 (06 Feb 2024 16:00)  HR: 91 (06 Feb 2024 16:00) (53 - 91)  BP: 90/50 (06 Feb 2024 16:00) (85/50 - 116/56)  BP(mean): 63 (06 Feb 2024 16:00) (63 - 82)  RR: 18 (06 Feb 2024 16:00) (18 - 20)  SpO2: 92% (06 Feb 2024 16:00) (92% - 97%)    Parameters below as of 06 Feb 2024 16:00  Patient On (Oxygen Delivery Method): nasal cannula, high flow          GENERAL:  [X ] No acute distress [ ]Lethargic  [ ]Unarousable  [ ]Verbal  [ ]Non-Verbal [ ]Cachexia    BEHAVIORAL/PSYCH:  [ ]Alert and Oriented x  [ ] Anxiety [ ] Delirium [ ] Agitation [X ] Calm   EYES: [ X] No scleral icterus [ ] Scleral icterus [ ] Closed  ENMT:  [ ]Dry mouth  [ ]No external oral lesions [ X] No external ear or nose lesions  CARDIOVASCULAR:  [ ]Regular [ ]Irregular [ ]Tachy [ ]Not Tachy  [ ]Raheem [ ] Edema [ ] No edema  PULMONARY:  [ ]Tachypnea  [ ]Audible excessive secretions [X ] No labored breathing [ ] labored breathing  GASTROINTESTINAL: [ ]Soft  [ ]Distended  [ X]Not distended [ ]Non tender [ ]Tender  MUSCULOSKELETAL: [ ]No clubbing [ ] clubbing  [ X] No cyanosis [ ] cyanosis  NEUROLOGIC: [ ]No focal deficits  [ ]Follows commands  [ ]Does not follow commands  [X ]Cognitive impairment  [ ]Dysphagia  [ ]Dysarthria  [ ]Paresis   SKIN: [ ] Jaundiced [X ] Non-jaundiced [ ]Rash [ ]No Rash [ ] Warm [ ] Dry  MISC/LINES: [ ] ET tube [ ] Trach [ ]NGT/OGT [ ]PEG [ ][place]    CRITICAL CARE:  [ ] Shock Present  [ ]Septic [ ]Cardiogenic [ ]Neurologic [ ]Hypovolemic  [ ]  Vasopressors [ ]  Inotropes   [ ]Respiratory failure present [ ]Mechanical ventilation [ ]Non-invasive ventilatory support [ ]High flow  [ ]Acute  [ ]Chronic [ ]Hypoxic  [ ]Hypercarbic [ ]Other  [ ]Other organ failure     LABS: reviewed by me                          8.8    16.32 )-----------( 364      ( 06 Feb 2024 04:43 )             29.5     02-06    146  |  103  |  14  ----------------------------<  174<H>  3.4<L>   |  33<H>  |  0.5<L>    Ca    8.7      06 Feb 2024 04:43  Mg     2.0     02-06        RADIOLOGY & ADDITIONAL STUDIES: reviewed by m    CXR 2/5/24    Support devices: Enteric tube satisfactory position.    Cardiac/ Mediastinum: unremarkable    Lungs/ Pleura: Diminishing left lung opacity/effusion. Stable smaller   right basilar opacity. No pneumothorax.    Skeletal/ soft tissues: Stable    PROTEIN CALORIE MALNUTRITION PRESENT: [ ]mild [ ]moderate [ ]severe [ ]underweight [ ]morbid obesity  https://www.andeal.org/vault/2440/web/files/ONC/Table_Clinical%20Characteristics%20to%20Document%20Malnutrition-White%20JV%20et%20al%202012.pdf    Height (cm): 136.4 (01-24-24 @ 09:52), 154.9 (11-17-23 @ 15:00), 145 (10-02-23 @ 12:00)  Weight (kg): 52.3 (01-21-24 @ 08:00), 60 (11-17-23 @ 15:00), 55.3 (10-02-23 @ 12:00)  BMI (kg/m2): 28.1 (01-24-24 @ 09:52), 21.8 (01-21-24 @ 08:00), 25 (11-17-23 @ 15:00)    [ ]PPSV2 < or = to 30% [ ]significant weight loss  [ ]poor nutritional intake  [ ]anasarca      [ ]Artificial Nutrition          Palliative Care Spiritual/Emotional Screening Tool Question  Severity (0-4):                    OR                    [X ] Unable to determine/NA  Score of 2 or greater indicates recommendation of Chaplaincy referral  Chaplaincy Referral: [ ] Yes [ ] Refused [ ] Following     Caregiver [place]:  [ ] Yes [ ] No    OR    [x ] Unable to determine. Will assess at later time if appropriate.  Social Work Referral [ ]  Patient and Family Centered Care Referral [ ]    Anticipatory Grief Present: [ ] Yes [ ] No    OR     [ x] Unable to determine. Will assess at later time if appropriate.  Social Work Referral [ ]  Patient and Family Centered Care Referral [ ]    REFERRALS:   [ ]Chaplaincy  [ ]Hospice  [ ]Child Life  [ ]Social Work  [ ]Case management [ ]Holistic Therapy     Palliative care education provided to patient and/or family    Goals of Care Document:     ______________  Wu Jenkins MD  Palliative Medicine  [facility]   of Geriatric and Palliative Medicine  [phone]

## 2024-02-06 NOTE — PROGRESS NOTE ADULT - ASSESSMENT
58F w/ PMHx Down Syndrome, nonverbal at baseline, DVT on eliquis 5mg BID,  Hypothyroidism, aspiration pneumonia in prev admissions, Cerebral palsy and Seizure Disorders presents to the ED from nursing facility/group home ([facility]) presented to ED for respiratory distress and high grade fever. Patient found to be septic on admission. Admitted for management of acute respiratory distress likely 2/2 CAP or aspiration PNA.    #Sepsis POA (Febrile, Tachycardic, Leukocytosis)  #Acute Hypoxic Respiratory Failure secondary to Aspiration PNA / RSV  #Hx of Dysphagia - Puree Diet at baseline  - On admission: VBG Lactate 2.1 improved to wnl, procal 0.04 pH wnl and CO2 mildly elevated 61  - MRSA negative   - BCx (1/20): Staph hominis repeat BCx have been NGTD  - UCx: Neg  - RVP: RSV detected  - MRSA swab neg, urine strep neg, urine legionella neg  - D-dimer 605 and LE duplex neg  - CXR pending Bibasilar opacities without significant change.  - Fungitell chronically positive, received Caspofungin on previous admission  - Repeat Flu/Covid/MRSA nares negative  - C/w Meropenem 1g IV q8hr per ID  - C/w Caspofungin 50mg IV QD per ID  - C/w NG feeds  - Will likely need PEG, ate minced and moist at group home now failing S+S, has NGT  -  Mrs. Carson [phone] to be called as patient will likely need PEG consent. Emergency contact contacted over weekend, see chart note from 2/3  - F/u Palliative care    #Hypotension in setting of Septic Shock  - Monitor BP as patient is spiking fevers  - Keep MAP >60  - C/w Midodrine 20mg q8  - On low dose levophed    #Elevated Troponin (Stabilized)  #Sinus Tachycardia  - Troponins: 25>37>35>35  - EKG shows sinus tachycardia, repeated  - Could likely be from hypovolemia/infectious state  - C/w Lopressor 25mg BID w/ hold parameters    #Acute on Chronic Normocytic Anemia (Stable)  #Iron Deficiency  - On admission: HgB 8.9 (previously 9.5)  - No evidence of hemorrhage  - B12 and Folate WNL normal  - Total Iron 15, Iron Saturation 6%, Ferritin 128 reflecting potential TIFFANI  - Hold off on iron supplementation in setting of suspected infection  - Keep HgB >7    #Hx of DVT of L Common Femoral Vein  - LE Duplex negative for DVT  - C/w Eliquis 5mg BID    #Seizure Disorder  - C/w Keppra 500mg BID     #Hx of Right Foot OM (1st Toe Stump and 2nd Distal Phalanx)  #Hx of Multiple Left Foot DTIs  #Hx of Sacral Wound  - Previous wound cultures positive for Proteus and ESBL E coli  - Patient underwent excisional debridement of ulcer of right foot (including 1st metatarsal) and partial amputation 2nd toe on previous admissions  - No acute surgical interventions from podiatry and burn  - C/w q2hr repositioning  - C/w local wound care  - Podiatry recalled 2/5 for foot wounds, updated recs placed     #Down Syndrome  #Cerebral Palsy  #Non-Verbal  #Post-Menopausal Bone Loss  - C/w Gabapentin 300mg BID  - C/w Raloxifene 60mg QD    #Misc  #Code Status: Full Code  #DVT ppx: Eliquis   #GI prophylaxis: Protonix  #Diet: Tube feeds  #Disposition: SDU 58F w/ PMHx Down Syndrome, nonverbal at baseline, DVT on eliquis 5mg BID,  Hypothyroidism, aspiration pneumonia in prev admissions, Cerebral palsy and Seizure Disorders presents to the ED from nursing facility/group home ([facility]) presented to ED for respiratory distress and high grade fever. Patient found to be septic on admission. Admitted for management of acute respiratory distress likely 2/2 CAP or aspiration PNA.    #Sepsis POA (Febrile, Tachycardic, Leukocytosis)  #Acute Hypoxic Respiratory Failure secondary to Aspiration PNA / RSV  #Hx of Dysphagia - Puree Diet at baseline  - On admission: VBG Lactate 2.1 improved to wnl, procal 0.04 pH wnl and CO2 mildly elevated 61  - MRSA negative   - BCx (1/20): Staph hominis repeat BCx have been NGTD  - UCx: Neg  - RVP: RSV detected  - MRSA swab neg, urine strep neg, urine legionella neg  - D-dimer 605 and LE duplex neg  - CXR pending Bibasilar opacities without significant change.  - Fungitell chronically positive, received Caspofungin on previous admission  - Repeat Flu/Covid/MRSA nares negative  - C/w Meropenem 1g IV q8hr per ID  - C/w Caspofungin 50mg IV QD per ID  - C/w NG feeds  - Will likely need PEG, ate minced and moist at group home now failing S+S, has NGT  -  Mrs. Carson [phone] to be called as patient will likely need PEG consent. Emergency contact contacted over weekend, see chart note from 2/3  - F/u Palliative care    #Hypotension in setting of Septic Shock  - Monitor BP as patient is spiking fevers  - Keep MAP >60  - C/w Midodrine 20mg q8  - On low dose levophed    #Elevated Troponin (Stabilized)  #Sinus Tachycardia  - Troponins: 25>37>35>35  - EKG shows sinus tachycardia, repeated  - Could likely be from hypovolemia/infectious state  - C/w Lopressor 25mg BID w/ hold parameters    #Acute on Chronic Normocytic Anemia (Stable)  #Iron Deficiency  - On admission: HgB 8.9 (previously 9.5)  - No evidence of hemorrhage  - B12 and Folate WNL normal  - Total Iron 15, Iron Saturation 6%, Ferritin 128 reflecting potential TIFFANI  - Hold off on iron supplementation in setting of suspected infection  - Keep HgB >7    #Hx of DVT of L Common Femoral Vein  - LE Duplex negative for DVT  - Lovenox BID    #Seizure Disorder  - C/w Keppra 500mg BID     #Hx of Right Foot OM (1st Toe Stump and 2nd Distal Phalanx)  #Hx of Multiple Left Foot DTIs  #Hx of Sacral Wound  - Previous wound cultures positive for Proteus and ESBL E coli  - Patient underwent excisional debridement of ulcer of right foot (including 1st metatarsal) and partial amputation 2nd toe on previous admissions  - No acute surgical interventions from podiatry and burn  - C/w q2hr repositioning  - C/w local wound care  - Podiatry recalled 2/5 for foot wounds, updated recs placed     #Down Syndrome  #Cerebral Palsy  #Non-Verbal  #Post-Menopausal Bone Loss  - C/w Gabapentin 300mg BID  - C/w Raloxifene 60mg QD    #Misc  #Code Status: Full Code  #DVT ppx: Lovenox  #GI prophylaxis: Protonix  #Diet: Tube feeds  #Disposition: SDU

## 2024-02-06 NOTE — SWALLOW BEDSIDE ASSESSMENT ADULT - COMMENTS
Pt s/p FEES 1/30, recs for NPO w/ NGT.  CXR 2/6-> unchanged bilateral opacities.  GI c/s for possible g-tube placement, "will consider PEG placement after resolution of the acute medical issues"

## 2024-02-06 NOTE — CHART NOTE - NSCHARTNOTEFT_GEN_A_CORE
Spoke w/ La Nena Vasquez [phone] who will fax over documents needed to obtain consent for invasive procedures re: PEG tube. The completed forms will be sent back and then presented to the state board. Spoke w/ La Nena Vasquez [phone] who will fax over documents needed to obtain consent for invasive procedures re: PEG tube. The completed forms will be sent back and then presented to the board of directors for final consent Adi w/ [place] Vasquez [phone],  of [employer], who will fax over documents needed to obtain consent for invasive procedures re: PEG tube. The completed forms will be sent back and then presented to the board of directors for final consent

## 2024-02-06 NOTE — PROGRESS NOTE ADULT - ASSESSMENT
IMPRESSION:    Acute hypoxemic respiratory failure  Likely aspiration pneumonia   Sepsis POA  Septic shock  on Levophed   Recurrent aspiration pneumonia/ prior intubation  HO GI bleed  HO OM  HO recent duodenal perforation   HO polymicrobial bacteremia   H/o CP, DS  H/o seizures    PLAN:    CNS:  Avoid CNS Depressant, AED. MS at baseline.     HEENT: Oral care.       PULMONARY: HOB at 45 degrees.  Aspiration precaution. Wean FiO2 Keep spo2 more than 92%.  Aggressive pulm toilet, frequent suctioning.  Might need MV.      CARDIOVASCULAR: Keep MAP more than 6o mmhg. Avoid overload. Wean levophed as able. C/w midodrine 20mg.  Goal directed fluid resuscitation     GI: Protonix. NG  feeding.  Speech and swallow recs noted.  Might need PEG    INFECTIOUS DISEASE:    c/w Meropenem and Caspo per ID.  FU Fungitell     HEMATOLOGICAL: change to Lovenox therapeutic.  Monitor CBC     ENDOCRINE:  Follow up FS.  Insulin protocol if needed.    MUSCULOSKELETAL: Bedrest.  Off loading.  Wound care.      Prognosis very poor.     Goals of care.  Palliative care evaluation     SDU.

## 2024-02-06 NOTE — PROGRESS NOTE ADULT - ASSESSMENT
58F with Down syndrome, nonverbal at baseline, hypothyroidism, CP, and seizure disorder here from [facility] with fever and respiratory distress. Found to be septic on admission, from CAP/aspiration PNA, on IV vanco and meropenem, with course c/b continued fevers, and bacteremia with S. hominis. Also failed FEES, has NGT in place and will likely need PEG. Is requiring HFNC alternating with BiPAP. Is also on midodrine for hypotension. Patient is full code. Of note patient is under [place] [facility]. Palliative care consulted for [facility].

## 2024-02-06 NOTE — SWALLOW BEDSIDE ASSESSMENT ADULT - SLP GENERAL OBSERVATIONS
pt received in bed awake +generalized weakness in no apparent pain. +HFNC 60L/min, 74% O2, +GH aide at bedside; +oral care administered

## 2024-02-06 NOTE — CHART NOTE - NSCHARTNOTEFT_GEN_A_CORE
Registered Dietitian Follow-Up     Patient Profile Reviewed                           Yes [x]   No []     Nutrition History Previously Obtained        Yes [x]  No []       Pertinent Subjective Information: Pt's RN on break. RN covering reports that pt's NGT feeds were held due to clogged NGT, but now fixed and pt started receiving feeds this morning.      Pertinent Medical Interventions:  #Sepsis POA (Febrile, Tachycardic, Leukocytosis)  #Acute Hypoxic Respiratory Failure secondary to Aspiration PNA / RSV  #Recommended Consistencies continue NPO w/ non-oral means of nutrition/hydration per SLP reccs   - C/w NG feeds  - Will likely need PEG, ate minced and moist at group home now failing S+S, has NGT  -  Mrs. Carson [phone] to be called as patient will likely need PEG consent.  #Hypotension in setting of Septic Shock  #Friction Injuries Healing -- per wound care RN      Diet order: Diet, NPO with Tube Feed:   Tube Feeding Modality: Nasogastric  Nepro with Carb Steady  Total Volume for 24 Hours (mL): 810  Bolus  Total Volume of Bolus (mL):  270  Tube Feed Frequency: Every 8 hours   Tube Feed Start Time: 00:00  Bolus Feed Rate (mL per Hour): 80   Bolus Feed Duration (in Hours): 1.5  Free Water Flush  Bolus   Total Volume per Flush (mL): 150   Frequency: Every 4 Hours   Total Daily Volume of Flush (mL): 900  Free Water Flush Instructions:  100 mL water flush pre/post feeds and additional 50 mL q4hr  No Carb Prosource TF     Qty per Day:  1 (24 @ 08:53) [Active]    Anthropometrics:  Height (cm): 136.4 (24 @ 09:52)  Wt: 52.3kg   BMI: 28.1  IBW: 45.5 kg    Daily Weight in k.9 (), Weight in k.2 (), Weight in k.3 (), Weight in k.1 (), Weight in k.1 ()    MEDICATIONS  (STANDING):  albuterol    90 MICROgram(s) HFA Inhaler 2 Puff(s) Inhalation two times a day  albuterol/ipratropium for Nebulization 3 milliLiter(s) Nebulizer four times a day  calcium carbonate   1250 mG (OsCal) 1 Tablet(s) Oral two times a day  caspofungin IVPB 50 milliGRAM(s) IV Intermittent every 24 hours  caspofungin IVPB      dextrose 5%. 1000 milliLiter(s) (75 mL/Hr) IV Continuous <Continuous>  enoxaparin Injectable 50 milliGRAM(s) SubCutaneous every 12 hours  levETIRAcetam  Solution 500 milliGRAM(s) Oral every 12 hours  meropenem  IVPB 1000 milliGRAM(s) IV Intermittent every 8 hours  metoprolol tartrate 25 milliGRAM(s) Oral two times a day  midodrine. 20 milliGRAM(s) Oral three times a day  norepinephrine Infusion 0.05 MICROgram(s)/kG/Min (4.9 mL/Hr) IV Continuous <Continuous>  pantoprazole  Injectable 40 milliGRAM(s) IV Push every 24 hours  polyethylene glycol 3350 17 Gram(s) Oral at bedtime  raloxifene 60 milliGRAM(s) Oral daily  senna 2 Tablet(s) Oral at bedtime    MEDICATIONS  (PRN):  aluminum hydroxide/magnesium hydroxide/simethicone Suspension 30 milliLiter(s) Oral every 4 hours PRN Dyspepsia  ondansetron Injectable 4 milliGRAM(s) IV Push every 8 hours PRN Nausea and/or Vomiting    Pertinent Labs: - @ 04:43: Na 146, BUN 14, Cr 0.5<L>, <H>, K+ 3.4<L>, Phos --, Mg 2.0, Alk Phos 81, ALT/SGPT 16, AST/SGOT 19, HbA1c --    Physical Findings:  - Appearance: unable to speak; no edema -  - GI function: WDL, last BM:   - Tubes: NGT  - Oral/Mouth cavity: NPO w/ TF   - Skin: multiple wounds      Nutrition Requirements: per note    Weight Used: 52.3kg      Estimated Energy Needs    Continue [x]  Adjust []  3647-8157 kcal/day (MSJ x 1.4-1.6)     Estimated Protein Needs    Continue [x]  Adjust []  68-78 g protein/day (1.3-1.5 g/kg)     Estimated Fluid Needs        Continue [x]  Adjust []  5975-3054 mL fluid /day (25-30 kcal/kg)     Nutrient Intake: current TF order provides 1458 kcal, 65 g protein, 588 mL free water -- flush with 100 mL pre/post feeds     [] Previous Nutrition Diagnosis: Inadequate enteral nutrition infusion            [] Ongoing          [X] Resolved     Nutrition Intervention: EN, coordination of care       Goal/Expected Outcome: pt to tolerate EN and meet % of energy and protein needs in 3-5 days.     Nutrition Monitoring:diet order,energy intake,body composition,NFPF, electrolytes     Recommendation:  1. Pt not on HD, K+ now low, no indication for a specialized high kcal/protein formula like Nepro.   Change TF order to Jevity 1.2 @300 q6h + 1 packet of No carb prosource TF -- Provides: 1480kcal, 77g, 972mL/ free H2O.   2. Monitor GI s/s and bowel movements  3. Maintain >45 degree angle during feeds    high risk f/u in 3-5 days   RD remains available: Samra Linder, JARVISN [phone]

## 2024-02-06 NOTE — PROGRESS NOTE ADULT - SUBJECTIVE AND OBJECTIVE BOX
Interval History  Patient is a 58y old Female who presents with a chief complaint of Respiratory Distress (06 Feb 2024 08:06)    TEA ECHAVARRIA is admitted with a primary diagnosis of Sepsis with acute hypoxic respiratory failure      Today is hospital day 17d. The patient was examined at the bedside this morning. No acute overnight events were reported.    Admission HPI  HPI:  58F w/ PMHx Down Syndrome, nonverbal at baseline, Hypothyroidism, Cerebral palsy and Seizure Disorders presents to the ED from nursing facility/group home ([facility]) presented to ED for respiratory distress and high grade fever. Patient found to be septic on admission. As per aide Janette at bedside, patient had been coughing and congested for 3x days. Denies fevers, chills, n/v/d or pain prior to admission. Patient is on a puree diet at baseline.    Vitals: Temp 104.5F (rectal), /74, , RR 24, SpO2 100% on BiPAP    Labs: Hgb 9.5 (previously 11.1), Platelet 422, INR 1.43, VBG Lactate 2.1    Imaging: CXR shows possible RML infiltrate    In the ED:  - s/p Cefepime 2g IV x1  - s/p Levaquin 750mg IV x1  - s/p 2L LR bolus    Admitted to SDU for management of acute respiratory distress 2/2 CAP/Aspiration PNA (20 Jan 2024 18:22)      Past Medical and Surgical History  Down syndrome    Osteoporosis    Mild anemia    Neuropathy    S/P debridement  of R hip on 3/2/21      Family History  FAMILY HISTORY:    Social History  Social History:      Allergies  No Known Allergies    Medications  Standing Medications  albuterol    90 MICROgram(s) HFA Inhaler 2 Puff(s) Inhalation two times a day  albuterol/ipratropium for Nebulization 3 milliLiter(s) Nebulizer four times a day  artificial  tears Solution 1 Drop(s) Both EYES two times a day  calcium carbonate   1250 mG (OsCal) 1 Tablet(s) Oral two times a day  caspofungin IVPB      caspofungin IVPB 50 milliGRAM(s) IV Intermittent every 24 hours  chlorhexidine 2% Cloths 1 Application(s) Topical daily  dextrose 5%. 1000 milliLiter(s) IV Continuous <Continuous>  enoxaparin Injectable 50 milliGRAM(s) SubCutaneous every 12 hours  gabapentin 300 milliGRAM(s) Oral every 12 hours  levETIRAcetam  Solution 500 milliGRAM(s) Oral every 12 hours  melatonin 3 milliGRAM(s) Oral at bedtime  meropenem  IVPB 1000 milliGRAM(s) IV Intermittent every 8 hours  metoprolol tartrate 25 milliGRAM(s) Oral two times a day  midodrine. 20 milliGRAM(s) Oral three times a day  norepinephrine Infusion 0.05 MICROgram(s)/kG/Min IV Continuous <Continuous>  pantoprazole  Injectable 40 milliGRAM(s) IV Push every 24 hours  polyethylene glycol 3350 17 Gram(s) Oral at bedtime  potassium chloride   Powder 40 milliEquivalent(s) Oral once  raloxifene 60 milliGRAM(s) Oral daily  scopolamine 1 mG/72 Hr(s) Patch 1 Patch Transdermal every 72 hours  senna 2 Tablet(s) Oral at bedtime  silver sulfADIAZINE 1% Cream 1 Application(s) Topical two times a day    PRN Medications  acetaminophen     Tablet .. 650 milliGRAM(s) Oral every 6 hours PRN  acetaminophen     Tablet .. 650 milliGRAM(s) Oral once PRN  aluminum hydroxide/magnesium hydroxide/simethicone Suspension 30 milliLiter(s) Oral every 4 hours PRN  ondansetron Injectable 4 milliGRAM(s) IV Push every 8 hours PRN  sodium chloride 0.65% Nasal 1 Spray(s) Both Nostrils daily PRN    Vitals  T(F): 98.1  HR: 75  BP: 101/53  RR: 18  SpO2: 97%    I&O's    02-05-24 @ 07:01  -  02-06-24 @ 07:00  --------------------------------------------------------  IN: 75 mL / OUT: 800 mL / NET: -725 mL        Physical Examination  General: Appears comfortable, nonverbal  Head: Poor dentition  Cardiac: Regular rate and rhythm  Pulmonary: Shallow breath sounds, no wheeze b/l  Abdominal: Soft, nontender, and nondistended  Extremities: No pitting edema  Neurologic: Tracks w/ eyes, nonverbal at baseline    Labs                        8.8    16.32 )-----------( 364      ( 06 Feb 2024 04:43 )             29.5     02-06    146  |  103  |  14  ----------------------------<  174<H>  3.4<L>   |  33<H>  |  0.5<L>    Ca    8.7      06 Feb 2024 04:43  Mg     2.0     02-06    TPro  5.8<L>  /  Alb  2.5<L>  /  TBili  0.2  /  DBili  x   /  AST  19  /  ALT  16  /  AlkPhos  81  02-06      Urinalysis Basic - ( 06 Feb 2024 04:43 )    Color: x / Appearance: x / SG: x / pH: x  Gluc: 174 mg/dL / Ketone: x  / Bili: x / Urobili: x   Blood: x / Protein: x / Nitrite: x   Leuk Esterase: x / RBC: x / WBC x   Sq Epi: x / Non Sq Epi: x / Bacteria: x      CAPILLARY BLOOD GLUCOSE            Imaging  CXR  Xray Chest 1 View- PORTABLE-Urgent:   ACC: [id number] EXAM:  XR CHEST PORTABLE URGENT 1V   ORDERED BY: SEVERINO PETERS     PROCEDURE DATE:  02/05/2024          INTERPRETATION:  Clinical History / Reason for exam: Nasogastric tube.    Comparison : Chest radiograph dated February 5, 2024.    Technique/Positioning: Portable frontal.    Findings:    Support devices: Nasogastric tube in place. Overlying EKG leads.    Cardiac/mediastinum/hilum: Stable.    Lung parenchyma/Pleura: Decreased patchy bilateral opacities, left   greater than right. No pneumothorax.    Skeleton/soft tissues: Stable.    Impression:    1. Decreased patchy bilateral opacities, left greater than right.    --- End of Report ---            STEVIE IRAHETA MD; Attending Radiologist  This document has been electronicallysigned. Feb 5 2024  3:49PM (02-05-24 @ 11:50)      CT   Interval History  Patient is a 58y old Female who presents with a chief complaint of Respiratory Distress (06 Feb 2024 08:06)    TEA ECHAVARRIA is admitted with a primary diagnosis of Sepsis with acute hypoxic respiratory failure      Today is hospital day 17d. The patient was examined at the bedside this morning. NGT was replaced overnight as it was clogged    Admission HPI  HPI:  58F w/ PMHx Down Syndrome, nonverbal at baseline, Hypothyroidism, Cerebral palsy and Seizure Disorders presents to the ED from nursing facility/group home ([facility]) presented to ED for respiratory distress and high grade fever. Patient found to be septic on admission. As per aide Janette at bedside, patient had been coughing and congested for 3x days. Denies fevers, chills, n/v/d or pain prior to admission. Patient is on a puree diet at baseline.    Vitals: Temp 104.5F (rectal), /74, , RR 24, SpO2 100% on BiPAP    Labs: Hgb 9.5 (previously 11.1), Platelet 422, INR 1.43, VBG Lactate 2.1    Imaging: CXR shows possible RML infiltrate    In the ED:  - s/p Cefepime 2g IV x1  - s/p Levaquin 750mg IV x1  - s/p 2L LR bolus    Admitted to SDU for management of acute respiratory distress 2/2 CAP/Aspiration PNA (20 Jan 2024 18:22)      Past Medical and Surgical History  Down syndrome    Osteoporosis    Mild anemia    Neuropathy    S/P debridement  of R hip on 3/2/21      Family History  FAMILY HISTORY:    Social History  Social History:      Allergies  No Known Allergies    Medications  Standing Medications  albuterol    90 MICROgram(s) HFA Inhaler 2 Puff(s) Inhalation two times a day  albuterol/ipratropium for Nebulization 3 milliLiter(s) Nebulizer four times a day  artificial  tears Solution 1 Drop(s) Both EYES two times a day  calcium carbonate   1250 mG (OsCal) 1 Tablet(s) Oral two times a day  caspofungin IVPB      caspofungin IVPB 50 milliGRAM(s) IV Intermittent every 24 hours  chlorhexidine 2% Cloths 1 Application(s) Topical daily  dextrose 5%. 1000 milliLiter(s) IV Continuous <Continuous>  enoxaparin Injectable 50 milliGRAM(s) SubCutaneous every 12 hours  gabapentin 300 milliGRAM(s) Oral every 12 hours  levETIRAcetam  Solution 500 milliGRAM(s) Oral every 12 hours  melatonin 3 milliGRAM(s) Oral at bedtime  meropenem  IVPB 1000 milliGRAM(s) IV Intermittent every 8 hours  metoprolol tartrate 25 milliGRAM(s) Oral two times a day  midodrine. 20 milliGRAM(s) Oral three times a day  norepinephrine Infusion 0.05 MICROgram(s)/kG/Min IV Continuous <Continuous>  pantoprazole  Injectable 40 milliGRAM(s) IV Push every 24 hours  polyethylene glycol 3350 17 Gram(s) Oral at bedtime  potassium chloride   Powder 40 milliEquivalent(s) Oral once  raloxifene 60 milliGRAM(s) Oral daily  scopolamine 1 mG/72 Hr(s) Patch 1 Patch Transdermal every 72 hours  senna 2 Tablet(s) Oral at bedtime  silver sulfADIAZINE 1% Cream 1 Application(s) Topical two times a day    PRN Medications  acetaminophen     Tablet .. 650 milliGRAM(s) Oral every 6 hours PRN  acetaminophen     Tablet .. 650 milliGRAM(s) Oral once PRN  aluminum hydroxide/magnesium hydroxide/simethicone Suspension 30 milliLiter(s) Oral every 4 hours PRN  ondansetron Injectable 4 milliGRAM(s) IV Push every 8 hours PRN  sodium chloride 0.65% Nasal 1 Spray(s) Both Nostrils daily PRN    Vitals  T(F): 98.1  HR: 75  BP: 101/53  RR: 18  SpO2: 97%    I&O's    02-05-24 @ 07:01  -  02-06-24 @ 07:00  --------------------------------------------------------  IN: 75 mL / OUT: 800 mL / NET: -725 mL        Physical Examination  General: Appears comfortable, nonverbal  Head: Poor dentition  Cardiac: Regular rate and rhythm  Pulmonary: Shallow breath sounds, no wheeze b/l  Abdominal: Soft, nontender, and nondistended  Extremities: No pitting edema  Neurologic: Tracks w/ eyes, nonverbal at baseline    Labs                        8.8    16.32 )-----------( 364      ( 06 Feb 2024 04:43 )             29.5     02-06    146  |  103  |  14  ----------------------------<  174<H>  3.4<L>   |  33<H>  |  0.5<L>    Ca    8.7      06 Feb 2024 04:43  Mg     2.0     02-06    TPro  5.8<L>  /  Alb  2.5<L>  /  TBili  0.2  /  DBili  x   /  AST  19  /  ALT  16  /  AlkPhos  81  02-06      Urinalysis Basic - ( 06 Feb 2024 04:43 )    Color: x / Appearance: x / SG: x / pH: x  Gluc: 174 mg/dL / Ketone: x  / Bili: x / Urobili: x   Blood: x / Protein: x / Nitrite: x   Leuk Esterase: x / RBC: x / WBC x   Sq Epi: x / Non Sq Epi: x / Bacteria: x      CAPILLARY BLOOD GLUCOSE            Imaging  CXR  Xray Chest 1 View- PORTABLE-Urgent:   ACC: [id number] EXAM:  XR CHEST PORTABLE URGENT 1V   ORDERED BY: SEVERINO PETERS     PROCEDURE DATE:  02/05/2024          INTERPRETATION:  Clinical History / Reason for exam: Nasogastric tube.    Comparison : Chest radiograph dated February 5, 2024.    Technique/Positioning: Portable frontal.    Findings:    Support devices: Nasogastric tube in place. Overlying EKG leads.    Cardiac/mediastinum/hilum: Stable.    Lung parenchyma/Pleura: Decreased patchy bilateral opacities, left   greater than right. No pneumothorax.    Skeleton/soft tissues: Stable.    Impression:    1. Decreased patchy bilateral opacities, left greater than right.    --- End of Report ---            STEVIE IRAHETA MD; Attending Radiologist  This document has been electronicallysigned. Feb 5 2024  3:49PM (02-05-24 @ 11:50)      CT

## 2024-02-06 NOTE — PROGRESS NOTE ADULT - ASSESSMENT
ASSESSMENT  58F w/ PMHx Down Syndrome, nonverbal at baseline, Hypothyroidism, Cerebral palsy and Seizure Disorders presents to the ED from nursing facility/group home presented to ED for respiratory distress and high grade fever.     IMPRESSION  #Fever  Unclear source     2/3 BCX NGTD     2/1 BCX NGTD     2/1 UCX   >=3 organisms. Probable collection contamination.    1/31 BCX NG    Rapid RVP Result: NotDetec (02-04-24 @ 10:28)    MRSA PCR Result.: Negative (02-03-24 @ 21:32)     UA without significant pyuria   Procalcitonin, Serum: 0.22 (02-01-24 @ 16:00)--> Procalcitonin, Serum: 0.15 (02-03-24 @ 11:13), downtrending ; unremarkable   MRSA PCR Result.: Negative (01-22-24 @ 05:30)  < from: Xray Chest 1 View-PORTABLE IMMEDIATE (Xray Chest 1 View-PORTABLE IMMEDIATE .) (02.01.24 @ 03:02) >  Bibasal opacities without significant change.   #Acute hypoxic respiratory failure- Gram Negative pneumonia   #1/20 BCX 1/4 bottles : Staphylococcus hominis- contaminant   Repeat CX NG   #Severe Sepsis on admission  #RSV + 1/21  #Elevated fungitell   Fungitell: 325 (01-20-24 @ 21:23)  Fungitell: 138 (11-07-23 @ 08:08)  Fungitell: 115 (11-02-23 @ 11:40)  Fungitell: >500 pg/mL (10.17.23 @ 17:20) s/p empiric caspo   #Full thickness ulcer right heel- Appreciate burn/podiatry evaluation.  #History of Right planter foot ulcers - two full thickness ulcers - serous drainage with mild erythema with OM  - MR Foot No Cont, Right (10.16.23 @ 21:51): IMPRESSION: 1.  Limited exam. 2.  Osteomyelitis of the first metatarsal stump. 3.  Osteomyelitis of the second toe distal phalanx.  - s/p excidional debridement to and including bone 1st metatarsal with partial 2nd digit amputation - 1st metatarsal head resected - Wound Cx Proteus ESBL   #CKD 2-3 Creatinine: 0.7 mg/dL (02.02.24 @ 04:59)      #History of buttock ulcer  #Down syndrome/Cerebral Palsy     RECOMMENDATIONS  - Continue caspofungin 50mg q24h IV , hx unclear elevated fungitell. No clear source  - Continue meropenem 1g q8h IV  - Repeat fungitell , send histoplasma Ab serum   - CT Chest, AP when stable     If any questions, please send a message or call on [employer]  Please continue to update ID with any pertinent new laboratory or radiographic findings.

## 2024-02-06 NOTE — PROGRESS NOTE ADULT - SUBJECTIVE AND OBJECTIVE BOX
TEA ECHAVARRIA  58y, Female  Allergy: No Known Allergies      LOS  17d    CHIEF COMPLAINT: Respiratory Distress (05 Feb 2024 14:35)      INTERVAL EVENTS/HPI  - No acute events overnight  - T(F): , Max: 100.8 (02-05-24 @ 16:07), fever curve and WBC downtrending with caspo   - Tolerating medication  - WBC Count: 16.32 (02-06-24 @ 04:43)  WBC Count: 19.80 (02-05-24 @ 05:43)     - Creatinine: 0.5 (02-06-24 @ 04:43)  Creatinine: 0.6 (02-05-24 @ 05:43)       ROS  unable to obtain history secondary to patient's mental status and/or sedation     VITALS:  T(F): 99.6, Max: 100.8 (02-05-24 @ 16:07),   HR: 65  BP: 111/55  RR: 18Vital Signs Last 24 Hrs  T(C): 37.6 (06 Feb 2024 04:45), Max: 38.2 (05 Feb 2024 16:07)  T(F): 99.6 (06 Feb 2024 04:45), Max: 100.8 (05 Feb 2024 16:07)  HR: 65 (06 Feb 2024 04:45) (53 - 93)  BP: 111/55 (06 Feb 2024 04:45) (95/55 - 111/55)  BP(mean): 79 (05 Feb 2024 11:00) (74 - 79)  RR: 18 (06 Feb 2024 04:45) (18 - 20)  SpO2: 96% (06 Feb 2024 04:45) (93% - 97%)    Parameters below as of 06 Feb 2024 03:00  Patient On (Oxygen Delivery Method): nasal cannula, high flow  O2 Flow (L/min): 60  O2 Concentration (%): 90    PHYSICAL EXAM:  ***    FH: Non-contributory  Social Hx: Non-contributory    TESTS & MEASUREMENTS:                        8.8    16.32 )-----------( 364      ( 06 Feb 2024 04:43 )             29.5     02-06    146  |  103  |  14  ----------------------------<  174<H>  3.4<L>   |  33<H>  |  0.5<L>    Ca    8.7      06 Feb 2024 04:43  Mg     2.0     02-06    TPro  5.8<L>  /  Alb  2.5<L>  /  TBili  0.2  /  DBili  x   /  AST  19  /  ALT  16  /  AlkPhos  81  02-06      LIVER FUNCTIONS - ( 06 Feb 2024 04:43 )  Alb: 2.5 g/dL / Pro: 5.8 g/dL / ALK PHOS: 81 U/L / ALT: 16 U/L / AST: 19 U/L / GGT: x           Urinalysis Basic - ( 06 Feb 2024 04:43 )    Color: x / Appearance: x / SG: x / pH: x  Gluc: 174 mg/dL / Ketone: x  / Bili: x / Urobili: x   Blood: x / Protein: x / Nitrite: x   Leuk Esterase: x / RBC: x / WBC x   Sq Epi: x / Non Sq Epi: x / Bacteria: x        Culture - Blood (collected 02-03-24 @ 11:13)  Source: .Blood None  Preliminary Report (02-05-24 @ 20:01):    No growth at 48 Hours    Culture - Blood (collected 02-01-24 @ 11:58)  Source: .Blood None  Preliminary Report (02-05-24 @ 23:01):    No growth at 4 days    Culture - Urine (collected 02-01-24 @ 11:40)  Source: Clean Catch Clean Catch (Midstream)  Final Report (02-03-24 @ 00:06):    >=3 organisms. Probable collection contamination.    Culture - Blood (collected 01-31-24 @ 18:21)  Source: .Blood None  Final Report (02-06-24 @ 01:01):    No growth at 5 days    Culture - Blood (collected 01-31-24 @ 18:21)  Source: .Blood None  Final Report (02-06-24 @ 01:01):    No growth at 5 days    Culture - Urine (collected 01-23-24 @ 05:20)  Source: Clean Catch Clean Catch (Midstream)  Final Report (01-25-24 @ 00:26):    No growth    Culture - Blood (collected 01-22-24 @ 16:51)  Source: .Blood None  Final Report (01-28-24 @ 01:00):    No growth at 5 days    Culture - Urine (collected 01-21-24 @ 05:00)  Source: Clean Catch Clean Catch (Midstream)  Final Report (01-22-24 @ 07:15):    No growth    Culture - Blood (collected 01-20-24 @ 16:15)  Source: .Blood Blood-Peripheral  Gram Stain (01-21-24 @ 20:33):    Growth in aerobic bottle: Gram positive cocci in pairs  Final Report (01-22-24 @ 19:04):    Growth in aerobic bottle: Staphylococcus hominis    Isolation of Coagulase negative Staphylococcus from single blood culture    sets may represent    contamination. Contact the Microbiology Department at [phone] if    susceptibility testing is    clinically indicated.    Direct identification is available within approximately 3-5    hours either by Blood Panel Multiplexed PCR or Direct    MALDI-TOF. Details: [email or web address]  Organism: Blood Culture PCR (01-22-24 @ 19:04)  Organism: Blood Culture PCR (01-22-24 @ 19:04)      Method Type: PCR      -  Coagulase negative Staphylococcus: Detec    Culture - Blood (collected 01-20-24 @ 16:15)  Source: .Blood Blood-Peripheral  Final Report (01-25-24 @ 23:00):    No growth at 5 days        Lactate, Blood: <0.2 mmol/L (02-03-24 @ 06:25)  Lactate, Blood: 1.0 mmol/L (02-02-24 @ 04:59)  Lactate, Blood: 1.3 mmol/L (02-01-24 @ 16:00)      INFECTIOUS DISEASES TESTING  Rapid RVP Result: NotDetec (02-04-24 @ 10:28)  MRSA PCR Result.: Negative (02-03-24 @ 21:32)  Procalcitonin, Serum: 0.15 (02-03-24 @ 11:13)  Procalcitonin, Serum: 0.22 (02-01-24 @ 16:00)  MRSA PCR Result.: Negative (01-22-24 @ 05:30)  Streptococcus pneumoniae Ag, Ur Result: Negative (01-21-24 @ 05:00)  Legionella Antigen, Urine: Negative (01-21-24 @ 05:00)  Rapid RVP Result: Detected (01-21-24 @ 00:58)  Procalcitonin, Serum: 0.51 (01-20-24 @ 21:23)  Fungitell: 325 (01-20-24 @ 21:23)  Vancomycin Level, Trough: 5.9 (11-12-23 @ 17:55)  Fungitell: 138 (11-07-23 @ 08:08)  Fungitell: 115 (11-02-23 @ 11:40)  Procalcitonin, Serum: 0.04 (11-02-23 @ 11:40)  Procalcitonin, Serum: 0.26 (10-24-23 @ 11:00)  MRSA PCR Result.: Negative (10-17-23 @ 17:20)  Fungitell: >500 (10-17-23 @ 17:20)  Procalcitonin, Serum: 4.36 (10-17-23 @ 17:20)  Procalcitonin, Serum: 4.18 (10-17-23 @ 12:35)  Procalcitonin, Serum: 0.07 (10-15-23 @ 07:28)  COVID-19 PCR: NotDetec (10-12-23 @ 09:14)  Procalcitonin, Serum: 0.40 (10-07-23 @ 10:54)  Streptococcus pneumoniae Ag, Ur Result: Negative (09-30-23 @ 14:36)  Legionella Antigen, Urine: Negative (09-30-23 @ 14:36)  MRSA PCR Result.: Negative (09-29-23 @ 16:50)  Procalcitonin, Serum: 9.78 (08-12-23 @ 11:25)  MRSA PCR Result.: Negative (08-12-23 @ 06:10)  Rapid RVP Result: NotDetec (08-12-23 @ 01:33)  Procalcitonin, Serum: 0.63 (08-10-23 @ 08:46)  Procalcitonin, Serum: 7.82 (08-04-23 @ 16:13)  MRSA PCR Result.: Negative (08-04-23 @ 14:52)  Rapid RVP Result: NotDetec (08-04-23 @ 03:00)  strept    INFLAMMATORY MARKERS  Sedimentation Rate, Erythrocyte: 100 mm/Hr (02-03-24 @ 11:13)  C-Reactive Protein, Serum: 214.2 mg/L (02-03-24 @ 11:13)  C-Reactive Protein, Serum: 132.3 mg/L (02-01-24 @ 16:00)  Sedimentation Rate, Erythrocyte: 67 mm/Hr (10-15-23 @ 07:28)      RADIOLOGY & ADDITIONAL TESTS:  I have personally reviewed the last available Chest xray  CXR  Xray Chest 1 View- PORTABLE-Urgent:   ACC: [id number] EXAM:  XR CHEST PORTABLE URGENT 1V   ORDERED BY: SEVERINO PETERS     PROCEDURE DATE:  02/05/2024          INTERPRETATION:  Clinical History / Reason for exam: Nasogastric tube.    Comparison : Chest radiograph dated February 5, 2024.    Technique/Positioning: Portable frontal.    Findings:    Support devices: Nasogastric tube in place. Overlying EKG leads.    Cardiac/mediastinum/hilum: Stable.    Lung parenchyma/Pleura: Decreased patchy bilateral opacities, left   greater than right. No pneumothorax.    Skeleton/soft tissues: Stable.    Impression:    1. Decreased patchy bilateral opacities, left greater than right.    --- End of Report ---            STEVIE IRAHETA MD; Attending Radiologist  This document has been electronicallysigned. Feb 5 2024  3:49PM (02-05-24 @ 11:50)      CT      CARDIOLOGY TESTING  12 Lead ECG:   Ventricular Rate 118 BPM    Atrial Rate 118 BPM    P-R Interval 122 ms    QRS Duration 64 ms    Q-T Interval 328 ms    QTC Calculation(Bazett) 459 ms    P Axis 54 degrees    R Axis 93 degrees    T Axis 58 degrees    Diagnosis Line Sinus tachycardia  Rightward axis  Low voltage QRS  Borderline ECG    Confirmed by MARJAN MENDES MD ([id number]) on 2/2/2024 7:15:17 AM (02-01-24 @ 14:30)  12 Lead ECG:   Ventricular Rate 88 BPM    Atrial Rate 88 BPM    P-R Interval 112 ms    QRS Duration 64 ms    Q-T Interval 362 ms    QTC Calculation(Bazett) 438 ms    P Axis -11 degrees    R Axis 95 degrees    T Axis 59 degrees    Diagnosis Line Normal sinus rhythm  Rightward axis  Borderline ECG    Confirmed by caleb roberts ([id number]) on 1/31/2024 2:01:26 PM (01-31-24 @ 11:11)      MEDICATIONS  albuterol    90 MICROgram(s) HFA Inhaler 2 Inhalation two times a day  albuterol/ipratropium for Nebulization 3 Nebulizer four times a day  artificial  tears Solution 1 Both EYES two times a day  calcium carbonate   1250 mG (OsCal) 1 Oral two times a day  caspofungin IVPB     caspofungin IVPB 50 IV Intermittent every 24 hours  chlorhexidine 2% Cloths 1 Topical daily  dextrose 5%. 1000 IV Continuous <Continuous>  enoxaparin Injectable 50 SubCutaneous every 12 hours  gabapentin 300 Oral every 12 hours  levETIRAcetam  Solution 500 Oral every 12 hours  melatonin 3 Oral at bedtime  meropenem  IVPB 1000 IV Intermittent every 8 hours  metoprolol tartrate 25 Oral two times a day  midodrine. 20 Oral three times a day  norepinephrine Infusion 0.05 IV Continuous <Continuous>  pantoprazole  Injectable 40 IV Push every 24 hours  polyethylene glycol 3350 17 Oral at bedtime  potassium chloride   Powder 40 Oral once  raloxifene 60 Oral daily  scopolamine 1 mG/72 Hr(s) Patch 1 Transdermal every 72 hours  senna 2 Oral at bedtime  silver sulfADIAZINE 1% Cream 1 Topical two times a day      WEIGHT  Weight (kg): 52.3 (01-21-24 @ 08:00)  Creatinine: 0.5 mg/dL (02-06-24 @ 04:43)      ANTIBIOTICS:  caspofungin IVPB      caspofungin IVPB 50 milliGRAM(s) IV Intermittent every 24 hours  meropenem  IVPB 1000 milliGRAM(s) IV Intermittent every 8 hours      All available historical records have been reviewed       JERICHO TEA  58y, Female  Allergy: No Known Allergies      LOS  17d    CHIEF COMPLAINT: Respiratory Distress (05 Feb 2024 14:35)      INTERVAL EVENTS/HPI  - No acute events overnight. NGT is coiled in her mouth- I informed the nurse and resident   - T(F): , Max: 100.8 (02-05-24 @ 16:07), fever curve and WBC downtrending with caspo   - Tolerating medication  - WBC Count: 16.32 (02-06-24 @ 04:43)  WBC Count: 19.80 (02-05-24 @ 05:43)     - Creatinine: 0.5 (02-06-24 @ 04:43)  Creatinine: 0.6 (02-05-24 @ 05:43)       ROS  unable to obtain history secondary to patient's mental status and/or sedation     VITALS:  T(F): 99.6, Max: 100.8 (02-05-24 @ 16:07),   HR: 65  BP: 111/55  RR: 18Vital Signs Last 24 Hrs  T(C): 37.6 (06 Feb 2024 04:45), Max: 38.2 (05 Feb 2024 16:07)  T(F): 99.6 (06 Feb 2024 04:45), Max: 100.8 (05 Feb 2024 16:07)  HR: 65 (06 Feb 2024 04:45) (53 - 93)  BP: 111/55 (06 Feb 2024 04:45) (95/55 - 111/55)  BP(mean): 79 (05 Feb 2024 11:00) (74 - 79)  RR: 18 (06 Feb 2024 04:45) (18 - 20)  SpO2: 96% (06 Feb 2024 04:45) (93% - 97%)    Parameters below as of 06 Feb 2024 03:00  Patient On (Oxygen Delivery Method): nasal cannula, high flow  O2 Flow (L/min): 60  O2 Concentration (%): 90    PHYSICAL EXAM:  Gen: chronically ill appearing  HFNC  HEENT: Normocephalic, atraumatic, NGT coiled in mouth   Neck: supple, no lymphadenopathy  CV: Regular rate & regular rhythm  Lungs: decreased BS at bases, no fremitus  Abdomen: Soft, BS present  Ext: Warm, well perfused LE dressings   contracted  Neuro: non focal, not following commands  Skin: no rash, no erythema  Lines: no phlebitis     FH: Non-contributory  Social Hx: Non-contributory    TESTS & MEASUREMENTS:                        8.8    16.32 )-----------( 364      ( 06 Feb 2024 04:43 )             29.5     02-06    146  |  103  |  14  ----------------------------<  174<H>  3.4<L>   |  33<H>  |  0.5<L>    Ca    8.7      06 Feb 2024 04:43  Mg     2.0     02-06    TPro  5.8<L>  /  Alb  2.5<L>  /  TBili  0.2  /  DBili  x   /  AST  19  /  ALT  16  /  AlkPhos  81  02-06      LIVER FUNCTIONS - ( 06 Feb 2024 04:43 )  Alb: 2.5 g/dL / Pro: 5.8 g/dL / ALK PHOS: 81 U/L / ALT: 16 U/L / AST: 19 U/L / GGT: x           Urinalysis Basic - ( 06 Feb 2024 04:43 )    Color: x / Appearance: x / SG: x / pH: x  Gluc: 174 mg/dL / Ketone: x  / Bili: x / Urobili: x   Blood: x / Protein: x / Nitrite: x   Leuk Esterase: x / RBC: x / WBC x   Sq Epi: x / Non Sq Epi: x / Bacteria: x        Culture - Blood (collected 02-03-24 @ 11:13)  Source: .Blood None  Preliminary Report (02-05-24 @ 20:01):    No growth at 48 Hours    Culture - Blood (collected 02-01-24 @ 11:58)  Source: .Blood None  Preliminary Report (02-05-24 @ 23:01):    No growth at 4 days    Culture - Urine (collected 02-01-24 @ 11:40)  Source: Clean Catch Clean Catch (Midstream)  Final Report (02-03-24 @ 00:06):    >=3 organisms. Probable collection contamination.    Culture - Blood (collected 01-31-24 @ 18:21)  Source: .Blood None  Final Report (02-06-24 @ 01:01):    No growth at 5 days    Culture - Blood (collected 01-31-24 @ 18:21)  Source: .Blood None  Final Report (02-06-24 @ 01:01):    No growth at 5 days    Culture - Urine (collected 01-23-24 @ 05:20)  Source: Clean Catch Clean Catch (Midstream)  Final Report (01-25-24 @ 00:26):    No growth    Culture - Blood (collected 01-22-24 @ 16:51)  Source: .Blood None  Final Report (01-28-24 @ 01:00):    No growth at 5 days    Culture - Urine (collected 01-21-24 @ 05:00)  Source: Clean Catch Clean Catch (Midstream)  Final Report (01-22-24 @ 07:15):    No growth    Culture - Blood (collected 01-20-24 @ 16:15)  Source: .Blood Blood-Peripheral  Gram Stain (01-21-24 @ 20:33):    Growth in aerobic bottle: Gram positive cocci in pairs  Final Report (01-22-24 @ 19:04):    Growth in aerobic bottle: Staphylococcus hominis    Isolation of Coagulase negative Staphylococcus from single blood culture    sets may represent    contamination. Contact the Microbiology Department at [phone] if    susceptibility testing is    clinically indicated.    Direct identification is available within approximately 3-5    hours either by Blood Panel Multiplexed PCR or Direct    MALDI-TOF. Details: [email or web address]  Organism: Blood Culture PCR (01-22-24 @ 19:04)  Organism: Blood Culture PCR (01-22-24 @ 19:04)      Method Type: PCR      -  Coagulase negative Staphylococcus: Detec    Culture - Blood (collected 01-20-24 @ 16:15)  Source: .Blood Blood-Peripheral  Final Report (01-25-24 @ 23:00):    No growth at 5 days        Lactate, Blood: <0.2 mmol/L (02-03-24 @ 06:25)  Lactate, Blood: 1.0 mmol/L (02-02-24 @ 04:59)  Lactate, Blood: 1.3 mmol/L (02-01-24 @ 16:00)      INFECTIOUS DISEASES TESTING  Rapid RVP Result: NotDetec (02-04-24 @ 10:28)  MRSA PCR Result.: Negative (02-03-24 @ 21:32)  Procalcitonin, Serum: 0.15 (02-03-24 @ 11:13)  Procalcitonin, Serum: 0.22 (02-01-24 @ 16:00)  MRSA PCR Result.: Negative (01-22-24 @ 05:30)  Streptococcus pneumoniae Ag, Ur Result: Negative (01-21-24 @ 05:00)  Legionella Antigen, Urine: Negative (01-21-24 @ 05:00)  Rapid RVP Result: Detected (01-21-24 @ 00:58)  Procalcitonin, Serum: 0.51 (01-20-24 @ 21:23)  Fungitell: 325 (01-20-24 @ 21:23)  Vancomycin Level, Trough: 5.9 (11-12-23 @ 17:55)  Fungitell: 138 (11-07-23 @ 08:08)  Fungitell: 115 (11-02-23 @ 11:40)  Procalcitonin, Serum: 0.04 (11-02-23 @ 11:40)  Procalcitonin, Serum: 0.26 (10-24-23 @ 11:00)  MRSA PCR Result.: Negative (10-17-23 @ 17:20)  Fungitell: >500 (10-17-23 @ 17:20)  Procalcitonin, Serum: 4.36 (10-17-23 @ 17:20)  Procalcitonin, Serum: 4.18 (10-17-23 @ 12:35)  Procalcitonin, Serum: 0.07 (10-15-23 @ 07:28)  COVID-19 PCR: NotDetec (10-12-23 @ 09:14)  Procalcitonin, Serum: 0.40 (10-07-23 @ 10:54)  Streptococcus pneumoniae Ag, Ur Result: Negative (09-30-23 @ 14:36)  Legionella Antigen, Urine: Negative (09-30-23 @ 14:36)  MRSA PCR Result.: Negative (09-29-23 @ 16:50)  Procalcitonin, Serum: 9.78 (08-12-23 @ 11:25)  MRSA PCR Result.: Negative (08-12-23 @ 06:10)  Rapid RVP Result: NotDetec (08-12-23 @ 01:33)  Procalcitonin, Serum: 0.63 (08-10-23 @ 08:46)  Procalcitonin, Serum: 7.82 (08-04-23 @ 16:13)  MRSA PCR Result.: Negative (08-04-23 @ 14:52)  Rapid RVP Result: NotDetec (08-04-23 @ 03:00)  strept    INFLAMMATORY MARKERS  Sedimentation Rate, Erythrocyte: 100 mm/Hr (02-03-24 @ 11:13)  C-Reactive Protein, Serum: 214.2 mg/L (02-03-24 @ 11:13)  C-Reactive Protein, Serum: 132.3 mg/L (02-01-24 @ 16:00)  Sedimentation Rate, Erythrocyte: 67 mm/Hr (10-15-23 @ 07:28)      RADIOLOGY & ADDITIONAL TESTS:  I have personally reviewed the last available Chest xray  CXR  Xray Chest 1 View- PORTABLE-Urgent:   ACC: [id number] EXAM:  XR CHEST PORTABLE URGENT 1V   ORDERED BY: SEVERINO PETERS     PROCEDURE DATE:  02/05/2024          INTERPRETATION:  Clinical History / Reason for exam: Nasogastric tube.    Comparison : Chest radiograph dated February 5, 2024.    Technique/Positioning: Portable frontal.    Findings:    Support devices: Nasogastric tube in place. Overlying EKG leads.    Cardiac/mediastinum/hilum: Stable.    Lung parenchyma/Pleura: Decreased patchy bilateral opacities, left   greater than right. No pneumothorax.    Skeleton/soft tissues: Stable.    Impression:    1. Decreased patchy bilateral opacities, left greater than right.    --- End of Report ---            STEVIE IRAHETA MD; Attending Radiologist  This document has been electronicallysigned. Feb 5 2024  3:49PM (02-05-24 @ 11:50)      CT      CARDIOLOGY TESTING  12 Lead ECG:   Ventricular Rate 118 BPM    Atrial Rate 118 BPM    P-R Interval 122 ms    QRS Duration 64 ms    Q-T Interval 328 ms    QTC Calculation(Bazett) 459 ms    P Axis 54 degrees    R Axis 93 degrees    T Axis 58 degrees    Diagnosis Line Sinus tachycardia  Rightward axis  Low voltage QRS  Borderline ECG    Confirmed by MARJAN MENDES MD ([id number]) on 2/2/2024 7:15:17 AM (02-01-24 @ 14:30)  12 Lead ECG:   Ventricular Rate 88 BPM    Atrial Rate 88 BPM    P-R Interval 112 ms    QRS Duration 64 ms    Q-T Interval 362 ms    QTC Calculation(Bazett) 438 ms    P Axis -11 degrees    R Axis 95 degrees    T Axis 59 degrees    Diagnosis Line Normal sinus rhythm  Rightward axis  Borderline ECG    Confirmed by caleb roberts ([id number]) on 1/31/2024 2:01:26 PM (01-31-24 @ 11:11)      MEDICATIONS  albuterol    90 MICROgram(s) HFA Inhaler 2 Inhalation two times a day  albuterol/ipratropium for Nebulization 3 Nebulizer four times a day  artificial  tears Solution 1 Both EYES two times a day  calcium carbonate   1250 mG (OsCal) 1 Oral two times a day  caspofungin IVPB     caspofungin IVPB 50 IV Intermittent every 24 hours  chlorhexidine 2% Cloths 1 Topical daily  dextrose 5%. 1000 IV Continuous <Continuous>  enoxaparin Injectable 50 SubCutaneous every 12 hours  gabapentin 300 Oral every 12 hours  levETIRAcetam  Solution 500 Oral every 12 hours  melatonin 3 Oral at bedtime  meropenem  IVPB 1000 IV Intermittent every 8 hours  metoprolol tartrate 25 Oral two times a day  midodrine. 20 Oral three times a day  norepinephrine Infusion 0.05 IV Continuous <Continuous>  pantoprazole  Injectable 40 IV Push every 24 hours  polyethylene glycol 3350 17 Oral at bedtime  potassium chloride   Powder 40 Oral once  raloxifene 60 Oral daily  scopolamine 1 mG/72 Hr(s) Patch 1 Transdermal every 72 hours  senna 2 Oral at bedtime  silver sulfADIAZINE 1% Cream 1 Topical two times a day      WEIGHT  Weight (kg): 52.3 (01-21-24 @ 08:00)  Creatinine: 0.5 mg/dL (02-06-24 @ 04:43)      ANTIBIOTICS:  caspofungin IVPB      caspofungin IVPB 50 milliGRAM(s) IV Intermittent every 24 hours  meropenem  IVPB 1000 milliGRAM(s) IV Intermittent every 8 hours      All available historical records have been reviewed

## 2024-02-06 NOTE — PROGRESS NOTE ADULT - SUBJECTIVE AND OBJECTIVE BOX
Patient is a 58y old  Female who presents with a chief complaint of Respiratory Distress (06 Feb 2024 06:58)        Over Night Events:  Remains critically illon HFNCO2.  ON levophed.          ROS:     All ROS are negative except HPI         PHYSICAL EXAM    ICU Vital Signs Last 24 Hrs  T(C): 36.7 (06 Feb 2024 07:30), Max: 38.2 (05 Feb 2024 16:07)  T(F): 98.1 (06 Feb 2024 07:30), Max: 100.8 (05 Feb 2024 16:07)  HR: 73 (06 Feb 2024 07:30) (53 - 93)  BP: 107/64 (06 Feb 2024 07:30) (95/55 - 111/55)  BP(mean): 82 (06 Feb 2024 07:30) (79 - 82)  ABP: --  ABP(mean): --  RR: 18 (06 Feb 2024 07:30) (18 - 20)  SpO2: 97% (06 Feb 2024 07:30) (93% - 97%)    O2 Parameters below as of 06 Feb 2024 07:30  Patient On (Oxygen Delivery Method): nasal cannula, high flow            CONSTITUTIONAL:  Ill appearing in NAD    ENT:   Airway patent,   Mouth with normal mucosa.       EYES:   Pupils equal,   Round and reactive to light.    CARDIAC:   Normal rate,   Regular rhythm.      RESPIRATORY:   No wheezing  Bilateral crackles   Normal chest expansion  Not tachypneic,  No use of accessory muscles    GASTROINTESTINAL:  Abdomen soft,   Non-tender,   No guarding,   + BS    MUSCULOSKELETAL:   No clubbing, cyanosis    NEUROLOGICAL:   not following commands     SKIN:   Skin normal color for race,   Warm and dry  No evidence of rash.          02-05-24 @ 07:01  -  02-06-24 @ 07:00  --------------------------------------------------------  IN:    dextrose 5%: 75 mL  Total IN: 75 mL    OUT:    Voided (mL): 800 mL  Total OUT: 800 mL    Total NET: -725 mL          LABS:                            8.8    16.32 )-----------( 364      ( 06 Feb 2024 04:43 )             29.5                                               02-06    146  |  103  |  14  ----------------------------<  174<H>  3.4<L>   |  33<H>  |  0.5<L>    Ca    8.7      06 Feb 2024 04:43  Mg     2.0     02-06    TPro  5.8<L>  /  Alb  2.5<L>  /  TBili  0.2  /  DBili  x   /  AST  19  /  ALT  16  /  AlkPhos  81  02-06                                             Urinalysis Basic - ( 06 Feb 2024 04:43 )    Color: x / Appearance: x / SG: x / pH: x  Gluc: 174 mg/dL / Ketone: x  / Bili: x / Urobili: x   Blood: x / Protein: x / Nitrite: x   Leuk Esterase: x / RBC: x / WBC x   Sq Epi: x / Non Sq Epi: x / Bacteria: x                                                  LIVER FUNCTIONS - ( 06 Feb 2024 04:43 )  Alb: 2.5 g/dL / Pro: 5.8 g/dL / ALK PHOS: 81 U/L / ALT: 16 U/L / AST: 19 U/L / GGT: x                                                  Culture - Blood (collected 03 Feb 2024 11:13)  Source: .Blood None  Preliminary Report (05 Feb 2024 20:01):    No growth at 48 Hours                                                                                           MEDICATIONS  (STANDING):  albuterol    90 MICROgram(s) HFA Inhaler 2 Puff(s) Inhalation two times a day  albuterol/ipratropium for Nebulization 3 milliLiter(s) Nebulizer four times a day  artificial  tears Solution 1 Drop(s) Both EYES two times a day  calcium carbonate   1250 mG (OsCal) 1 Tablet(s) Oral two times a day  caspofungin IVPB      caspofungin IVPB 50 milliGRAM(s) IV Intermittent every 24 hours  chlorhexidine 2% Cloths 1 Application(s) Topical daily  dextrose 5%. 1000 milliLiter(s) (75 mL/Hr) IV Continuous <Continuous>  enoxaparin Injectable 50 milliGRAM(s) SubCutaneous every 12 hours  gabapentin 300 milliGRAM(s) Oral every 12 hours  levETIRAcetam  Solution 500 milliGRAM(s) Oral every 12 hours  melatonin 3 milliGRAM(s) Oral at bedtime  meropenem  IVPB 1000 milliGRAM(s) IV Intermittent every 8 hours  metoprolol tartrate 25 milliGRAM(s) Oral two times a day  midodrine. 20 milliGRAM(s) Oral three times a day  norepinephrine Infusion 0.05 MICROgram(s)/kG/Min (4.9 mL/Hr) IV Continuous <Continuous>  pantoprazole  Injectable 40 milliGRAM(s) IV Push every 24 hours  polyethylene glycol 3350 17 Gram(s) Oral at bedtime  potassium chloride   Powder 40 milliEquivalent(s) Oral once  raloxifene 60 milliGRAM(s) Oral daily  scopolamine 1 mG/72 Hr(s) Patch 1 Patch Transdermal every 72 hours  senna 2 Tablet(s) Oral at bedtime  silver sulfADIAZINE 1% Cream 1 Application(s) Topical two times a day    MEDICATIONS  (PRN):  acetaminophen     Tablet .. 650 milliGRAM(s) Oral once PRN Temp greater or equal to 38.5C (101.3F)  acetaminophen     Tablet .. 650 milliGRAM(s) Oral every 6 hours PRN Temp greater or equal to 38C (100.4F), Mild Pain (1 - 3)  aluminum hydroxide/magnesium hydroxide/simethicone Suspension 30 milliLiter(s) Oral every 4 hours PRN Dyspepsia  ondansetron Injectable 4 milliGRAM(s) IV Push every 8 hours PRN Nausea and/or Vomiting  sodium chloride 0.65% Nasal 1 Spray(s) Both Nostrils daily PRN Nasal Congestion      New X-rays reviewed:                                                                                  ECHO

## 2024-02-06 NOTE — PROGRESS NOTE ADULT - ASSESSMENT
58F w/ PMHx Down Syndrome, nonverbal at baseline, Hypothyroidism, Cerebral palsy and Seizure Disorders presents to the ED from nursing facility/group home ([facility]) presented to ED for respiratory distress and high grade fever. Patient found to be septic on admission.Admitted to SDU for management of acute respiratory distress 2/2 CAP/Aspiration PNA (20 Jan 2024 18:22)  While pt was on the floor she developed dyspnea after swallow evaluation, was spiking high grade fever, consulted by pulmonary/critical care and was upgraded back to SDU.    A/P  # Sepsis POA / Acute hypoxic resp failure / RSV bronchiolitis /  H/o Dysphagia/ suspected aspiration  - inflammatory markers elevated, RVP is negative    - MRSA negative   - BCx (1/20): Staph hominis -  contaminant repeat BCx have been NGTD  - Failed FEES, put  NG tube for feedings and medications, pt'll likely need PEG, consulted by GI   - Aspiration precautions, order chest PT vest   - c/w Scopolamine patch  - supplement oxygen, monitor pulse Ox ,repeat ABG   - c/w duoneb  - ID is following, recommendations noted:   - c/w  Caspo 70mg x1 and 50mg q24h IV , repeat fungitell. No clear source  - Continue meropenem 1g q8h IV  - check  histoplasma Ab serum     - pulmonary is following, recommendations noted;  - On HFNC; alternate with BIPAP. Keep spo2 more than 92%.    # Hypernatremia  - resolved after IV hydration     # H/o hypotension  -  Midodrine dose increased to 20 mg Q 8 hours   - taper  off pressure support  ast tolerated   - keep MAP above 60     # Elevated Troponin , type 2 MI/  Sinus tachycardia  - c/w telemetry monitoring   - Repeat EKG: Normal sinus rhythm  - c/w  metoprolol  -  maintain fluid balance     # Seizure Disorder  - c/w  Keppra   - seizure precautions  - keep Mg above 2.0     # H/o lower ext DVT  - therapeutic Lovenox recommended by ICU team  - hold Eliquis     # Hypothyroidism  - Thyroid Stimulating Hormone, Serum: 0.51 uIU/mL (01.21.24 @ 06:34)  - check FT4     # Normocytic Anemia   - stable    # Down Syndrome/  Cerebral Palsy  - supportive care  - prevent falls and aspiration   - failed speech and swallow, needs PEG   - on Raloxifene     # Full code    # GI prophylaxis       Pending (specify):  pulmonary toilet, chest PT vest,  frequent suction, Scopolamine patch, c/w  Caspofungin and Meropenem, repeat fungitel level , check  histoplasma Ab serum , increase Midodrine to 20 mg Q 8 hours keep MAP above 60, taper off pressure support, hold Eliquis, start Therapeutic Lovenox ,   monitor WBC and fever curve,  supportive care, pt needs PEG, paperwork started with facility.

## 2024-02-06 NOTE — PROGRESS NOTE ADULT - SUBJECTIVE AND OBJECTIVE BOX
58F w/ PMHx Down Syndrome, nonverbal at baseline, Hypothyroidism, Cerebral palsy and Seizure Disorders presents to the ED from nursing facility/group home ([facility]) presented to ED for respiratory distress and high grade fever. Patient found to be septic on admission.Admitted to SDU for management of acute respiratory distress 2/2 CAP/Aspiration PNA (20 Jan 2024 18:22)  While pt was on the floor she developed dyspnea after swallow evaluation, was spiking high grade fever, consulted by pulmonary/critical care and was upgraded back to SDU.  Today pt is awake, comfortable, nonverbal.       PAST MEDICAL & SURGICAL HISTORY:  Down syndrome  Osteoporosis  Mild anemia  Neuropathy  S/P debridement of R hip on 3/2/21    Allergies  No Known Allergies    Vital Signs Last 24 Hrs  T(C): 36.2 (06 Feb 2024 11:00), Max: 38.2 (05 Feb 2024 16:07)  T(F): 97.2 (06 Feb 2024 11:00), Max: 100.8 (05 Feb 2024 16:07)  HR: 75 (06 Feb 2024 11:00) (53 - 83)  BP: 94/56 (06 Feb 2024 15:00) (85/50 - 116/56)  BP(mean): 68 (06 Feb 2024 15:00) (63 - 82)  RR: 18 (06 Feb 2024 11:00) (18 - 20)  SpO2: 96% (06 Feb 2024 11:00) (93% - 97%)    Parameters below as of 06 Feb 2024 11:00  Patient On (Oxygen Delivery Method): nasal cannula, high flow          PHYSICAL EXAM:   GENERAL: in mild distress, anxious with eyes open, nonverbal   HEENT: atraumatic, EOMI.  Lungs:   coarse breath sounds  CVS: SIS2 +, tachycardia, no murmur.  Abdomen/ GI:  Soft,  NT, ND, BS+  CNS: awake , non verbal, anxious   Ext: contracted  Skin: no rash, no ulcers.    LABs:                           8.8    16.32 )-----------( 364      ( 06 Feb 2024 04:43 )             29.5     02-06    146  |  103  |  14  ----------------------------<  174<H>  3.4<L>   |  33<H>  |  0.5<L>    Ca    8.7      06 Feb 2024 04:43  Mg     2.0     02-06    TPro  5.8<L>  /  Alb  2.5<L>  /  TBili  0.2  /  DBili  x   /  AST  19  /  ALT  16  /  AlkPhos  81  02-06      Parameters below as of 04 Feb 2024 08:47  Patient On (Oxygen Delivery Method): nasal cannula, high flow  O2 Flow (L/min): 60  O2 Concentration (%): 100  Urinalysis Basic - ( 01 Feb 2024 11:40 )    Color: Yellow / Appearance: Turbid / SG: >1.030 / pH: x  Gluc: x / Ketone: Negative mg/dL  / Bili: Negative / Urobili: 1.0 mg/dL   Blood: x / Protein: 100 mg/dL / Nitrite: Negative   Leuk Esterase: Negative / RBC: 10 /HPF / WBC 5 /HPF   Sq Epi: x / Non Sq Epi: 2 /HPF / Bacteria: Negative /HPF    Culture - Blood (01.31.24 @ 18:21)   Specimen Source: .Blood None  Culture Results:   No growth at 24 hoursCulture - Urine (01.23.24 @ 05:20)   Specimen Source: Clean Catch Clean Catch (Midstream)  Culture Results:   No growthCulture - Blood (01.20.24 @ 16:15)   - Coagulase negative Staphylococcus: Detec  Gram Stain:   Growth in aerobic bottle: Gram positive cocci in pairs  Specimen Source: .Blood Blood-Peripheral  Organism: Blood Culture PCR  Culture Results:   Growth in aerobic bottle: Staphylococcus hominis   Isolation of Coagulase negative Staphylococcus from single blood culture     sets may represent   contamination. Contact the Microbiology Department at [phone] if   susceptibility testing is   clinically indicated.   Direct identification is available within approximately 3-5   hours either by Blood Panel Multiplexed PCR or Direct   MALDI-TOF. Details: [email or web address]  Organism Identification: Blood Culture PCR  Method Type: PCR    < from: Xray Chest 1 View- PORTABLE-Routine (Xray Chest 1 View- PORTABLE-Routine in AM.) (02.05.24 @ 06:24) >    Findings/  impression:        Support devices: Enteric tube satisfactory position.    Cardiac/ Mediastinum: unremarkable    Lungs/ Pleura: Diminishing left lung opacity/effusion. Stable smaller   right basilar opacity. No pneumothorax.    Skeletal/ soft tissues: Stable    < end of copied text >      RADIOLOGY:   < from: Xray Chest 1 View- PORTABLE-Routine (Xray Chest 1 View- PORTABLE-Routine .) (02.02.24 @ 09:22) >  Impression:    Retrocardiac opacification, worsened.    < end of copied text >  < from: VA Duplex Lower Ext Vein Scan, Bilat (01.22.24 @ 14:52) >  IMPRESSION:  No evidence of deep venous thrombosis in either lower extremity.    < end of copied text >  < from: TTE Echo Complete w/ Contrast w/o Doppler (01.22.24 @ 13:35) >  Summary:   1. Technically difficult and limited study.   2. Endocardial visualization was enhanced with intravenous echo contrast.   3. Left ventricular ejection fraction, by visual estimation, is >55%.   4. Normal global left ventricular systolic function.   5. The left ventricular diastolic function could not be assessed in this   study.  < from: Xray Chest 1 View- PORTABLE-Routine (Xray Chest 1 View- PORTABLE-Routine in AM.) (02.03.24 @ 06:48) >    Impression:    Stable bilateral opacities        MEDICATIONS  (STANDING):  albuterol    90 MICROgram(s) HFA Inhaler 2 Puff(s) Inhalation two times a day  albuterol/ipratropium for Nebulization 3 milliLiter(s) Nebulizer four times a day  artificial  tears Solution 1 Drop(s) Both EYES two times a day  calcium carbonate   1250 mG (OsCal) 1 Tablet(s) Oral two times a day  caspofungin IVPB      caspofungin IVPB 50 milliGRAM(s) IV Intermittent every 24 hours  chlorhexidine 2% Cloths 1 Application(s) Topical daily  dextrose 5%. 1000 milliLiter(s) (75 mL/Hr) IV Continuous <Continuous>  enoxaparin Injectable 50 milliGRAM(s) SubCutaneous every 12 hours  gabapentin 300 milliGRAM(s) Oral every 12 hours  levETIRAcetam  Solution 500 milliGRAM(s) Oral every 12 hours  melatonin 3 milliGRAM(s) Oral at bedtime  meropenem  IVPB 1000 milliGRAM(s) IV Intermittent every 8 hours  metoprolol tartrate 25 milliGRAM(s) Oral two times a day  midodrine. 20 milliGRAM(s) Oral three times a day  norepinephrine Infusion 0.05 MICROgram(s)/kG/Min (4.9 mL/Hr) IV Continuous <Continuous>  pantoprazole  Injectable 40 milliGRAM(s) IV Push every 24 hours  polyethylene glycol 3350 17 Gram(s) Oral at bedtime  raloxifene 60 milliGRAM(s) Oral daily  scopolamine 1 mG/72 Hr(s) Patch 1 Patch Transdermal every 72 hours  senna 2 Tablet(s) Oral at bedtime  silver sulfADIAZINE 1% Cream 1 Application(s) Topical two times a day    MEDICATIONS  (PRN):  acetaminophen     Tablet .. 650 milliGRAM(s) Oral once PRN Temp greater or equal to 38.5C (101.3F)  acetaminophen     Tablet .. 650 milliGRAM(s) Oral every 6 hours PRN Temp greater or equal to 38C (100.4F), Mild Pain (1 - 3)  aluminum hydroxide/magnesium hydroxide/simethicone Suspension 30 milliLiter(s) Oral every 4 hours PRN Dyspepsia  ondansetron Injectable 4 milliGRAM(s) IV Push every 8 hours PRN Nausea and/or Vomiting  sodium chloride 0.65% Nasal 1 Spray(s) Both Nostrils daily PRN Nasal Congestion

## 2024-02-07 LAB
ALBUMIN SERPL ELPH-MCNC: 2.4 G/DL — LOW (ref 3.5–5.2)
ALP SERPL-CCNC: 80 U/L — SIGNIFICANT CHANGE UP (ref 30–115)
ALT FLD-CCNC: 15 U/L — SIGNIFICANT CHANGE UP (ref 0–41)
ANION GAP SERPL CALC-SCNC: 7 MMOL/L — SIGNIFICANT CHANGE UP (ref 7–14)
ANION GAP SERPL CALC-SCNC: 9 MMOL/L — SIGNIFICANT CHANGE UP (ref 7–14)
AST SERPL-CCNC: 18 U/L — SIGNIFICANT CHANGE UP (ref 0–41)
BASE EXCESS BLDA CALC-SCNC: 10.9 MMOL/L — HIGH (ref -2–3)
BASOPHILS # BLD AUTO: 0.04 K/UL — SIGNIFICANT CHANGE UP (ref 0–0.2)
BASOPHILS NFR BLD AUTO: 0.4 % — SIGNIFICANT CHANGE UP (ref 0–1)
BILIRUB SERPL-MCNC: <0.2 MG/DL — SIGNIFICANT CHANGE UP (ref 0.2–1.2)
BUN SERPL-MCNC: 13 MG/DL — SIGNIFICANT CHANGE UP (ref 10–20)
BUN SERPL-MCNC: 15 MG/DL — SIGNIFICANT CHANGE UP (ref 10–20)
CALCIUM SERPL-MCNC: 8.2 MG/DL — LOW (ref 8.4–10.4)
CALCIUM SERPL-MCNC: 8.2 MG/DL — LOW (ref 8.4–10.4)
CHLORIDE SERPL-SCNC: 105 MMOL/L — SIGNIFICANT CHANGE UP (ref 98–110)
CHLORIDE SERPL-SCNC: 109 MMOL/L — SIGNIFICANT CHANGE UP (ref 98–110)
CO2 SERPL-SCNC: 31 MMOL/L — SIGNIFICANT CHANGE UP (ref 17–32)
CO2 SERPL-SCNC: 32 MMOL/L — SIGNIFICANT CHANGE UP (ref 17–32)
CREAT SERPL-MCNC: 0.5 MG/DL — LOW (ref 0.7–1.5)
CREAT SERPL-MCNC: 0.5 MG/DL — LOW (ref 0.7–1.5)
EGFR: 109 ML/MIN/1.73M2 — SIGNIFICANT CHANGE UP
EGFR: 109 ML/MIN/1.73M2 — SIGNIFICANT CHANGE UP
EOSINOPHIL # BLD AUTO: 0.27 K/UL — SIGNIFICANT CHANGE UP (ref 0–0.7)
EOSINOPHIL NFR BLD AUTO: 2.8 % — SIGNIFICANT CHANGE UP (ref 0–8)
GLUCOSE SERPL-MCNC: 119 MG/DL — HIGH (ref 70–99)
GLUCOSE SERPL-MCNC: 136 MG/DL — HIGH (ref 70–99)
HCO3 BLDA-SCNC: 36 MMOL/L — HIGH (ref 21–28)
HCT VFR BLD CALC: 27.5 % — LOW (ref 37–47)
HGB BLD-MCNC: 8.3 G/DL — LOW (ref 12–16)
IMM GRANULOCYTES NFR BLD AUTO: 1.8 % — HIGH (ref 0.1–0.3)
LACTATE BLDA-MCNC: 1.8 MMOL/L — SIGNIFICANT CHANGE UP (ref 0.5–2)
LYMPHOCYTES # BLD AUTO: 1.04 K/UL — LOW (ref 1.2–3.4)
LYMPHOCYTES # BLD AUTO: 10.7 % — LOW (ref 20.5–51.1)
MAGNESIUM SERPL-MCNC: 2.1 MG/DL — SIGNIFICANT CHANGE UP (ref 1.8–2.4)
MCHC RBC-ENTMCNC: 24.1 PG — LOW (ref 27–31)
MCHC RBC-ENTMCNC: 30.2 G/DL — LOW (ref 32–37)
MCV RBC AUTO: 79.7 FL — LOW (ref 81–99)
MONOCYTES # BLD AUTO: 0.54 K/UL — SIGNIFICANT CHANGE UP (ref 0.1–0.6)
MONOCYTES NFR BLD AUTO: 5.5 % — SIGNIFICANT CHANGE UP (ref 1.7–9.3)
NEUTROPHILS # BLD AUTO: 7.69 K/UL — HIGH (ref 1.4–6.5)
NEUTROPHILS NFR BLD AUTO: 78.8 % — HIGH (ref 42.2–75.2)
NRBC # BLD: 0 /100 WBCS — SIGNIFICANT CHANGE UP (ref 0–0)
PCO2 BLDA: 47 MMHG — HIGH (ref 32–45)
PH BLDA: 7.49 — HIGH (ref 7.35–7.45)
PLATELET # BLD AUTO: 332 K/UL — SIGNIFICANT CHANGE UP (ref 130–400)
PMV BLD: 11.2 FL — HIGH (ref 7.4–10.4)
PO2 BLDA: 122 MMHG — HIGH (ref 83–108)
POTASSIUM SERPL-MCNC: 2.9 MMOL/L — LOW (ref 3.5–5)
POTASSIUM SERPL-MCNC: 5.4 MMOL/L — HIGH (ref 3.5–5)
POTASSIUM SERPL-SCNC: 2.9 MMOL/L — LOW (ref 3.5–5)
POTASSIUM SERPL-SCNC: 5.4 MMOL/L — HIGH (ref 3.5–5)
PROT SERPL-MCNC: 5.3 G/DL — LOW (ref 6–8)
RBC # BLD: 3.45 M/UL — LOW (ref 4.2–5.4)
RBC # FLD: 19.7 % — HIGH (ref 11.5–14.5)
SAO2 % BLDA: 99.4 % — HIGH (ref 94–98)
SODIUM SERPL-SCNC: 146 MMOL/L — SIGNIFICANT CHANGE UP (ref 135–146)
SODIUM SERPL-SCNC: 147 MMOL/L — HIGH (ref 135–146)
WBC # BLD: 9.76 K/UL — SIGNIFICANT CHANGE UP (ref 4.8–10.8)
WBC # FLD AUTO: 9.76 K/UL — SIGNIFICANT CHANGE UP (ref 4.8–10.8)

## 2024-02-07 PROCEDURE — 99233 SBSQ HOSP IP/OBS HIGH 50: CPT

## 2024-02-07 PROCEDURE — 71045 X-RAY EXAM CHEST 1 VIEW: CPT | Mod: 26

## 2024-02-07 PROCEDURE — 99291 CRITICAL CARE FIRST HOUR: CPT

## 2024-02-07 RX ORDER — SODIUM CHLORIDE 9 MG/ML
1000 INJECTION, SOLUTION INTRAVENOUS ONCE
Refills: 0 | Status: DISCONTINUED | OUTPATIENT
Start: 2024-02-07 | End: 2024-02-07

## 2024-02-07 RX ORDER — IPRATROPIUM/ALBUTEROL SULFATE 18-103MCG
3 AEROSOL WITH ADAPTER (GRAM) INHALATION
Refills: 0 | Status: DISCONTINUED | OUTPATIENT
Start: 2024-02-07 | End: 2024-02-12

## 2024-02-07 RX ORDER — IPRATROPIUM/ALBUTEROL SULFATE 18-103MCG
3 AEROSOL WITH ADAPTER (GRAM) INHALATION EVERY 6 HOURS
Refills: 0 | Status: DISCONTINUED | OUTPATIENT
Start: 2024-02-07 | End: 2024-02-07

## 2024-02-07 RX ORDER — METOPROLOL TARTRATE 50 MG
12.5 TABLET ORAL
Refills: 0 | Status: DISCONTINUED | OUTPATIENT
Start: 2024-02-07 | End: 2024-02-11

## 2024-02-07 RX ORDER — POTASSIUM CHLORIDE 20 MEQ
40 PACKET (EA) ORAL ONCE
Refills: 0 | Status: COMPLETED | OUTPATIENT
Start: 2024-02-07 | End: 2024-02-07

## 2024-02-07 RX ORDER — POTASSIUM CHLORIDE 20 MEQ
20 PACKET (EA) ORAL
Refills: 0 | Status: COMPLETED | OUTPATIENT
Start: 2024-02-07 | End: 2024-02-07

## 2024-02-07 RX ORDER — SODIUM ZIRCONIUM CYCLOSILICATE 10 G/10G
5 POWDER, FOR SUSPENSION ORAL ONCE
Refills: 0 | Status: COMPLETED | OUTPATIENT
Start: 2024-02-07 | End: 2024-02-07

## 2024-02-07 RX ORDER — SODIUM CHLORIDE 9 MG/ML
500 INJECTION, SOLUTION INTRAVENOUS ONCE
Refills: 0 | Status: COMPLETED | OUTPATIENT
Start: 2024-02-07 | End: 2024-02-07

## 2024-02-07 RX ADMIN — Medication 1 TABLET(S): at 05:11

## 2024-02-07 RX ADMIN — Medication 1 APPLICATION(S): at 05:20

## 2024-02-07 RX ADMIN — MIDODRINE HYDROCHLORIDE 20 MILLIGRAM(S): 2.5 TABLET ORAL at 17:44

## 2024-02-07 RX ADMIN — GABAPENTIN 300 MILLIGRAM(S): 400 CAPSULE ORAL at 05:10

## 2024-02-07 RX ADMIN — MEROPENEM 100 MILLIGRAM(S): 1 INJECTION INTRAVENOUS at 14:20

## 2024-02-07 RX ADMIN — Medication 1 TABLET(S): at 17:45

## 2024-02-07 RX ADMIN — MEROPENEM 100 MILLIGRAM(S): 1 INJECTION INTRAVENOUS at 21:27

## 2024-02-07 RX ADMIN — PANTOPRAZOLE SODIUM 40 MILLIGRAM(S): 20 TABLET, DELAYED RELEASE ORAL at 05:16

## 2024-02-07 RX ADMIN — Medication 3 MILLIGRAM(S): at 21:28

## 2024-02-07 RX ADMIN — MIDODRINE HYDROCHLORIDE 20 MILLIGRAM(S): 2.5 TABLET ORAL at 05:09

## 2024-02-07 RX ADMIN — Medication 50 MILLIEQUIVALENT(S): at 11:19

## 2024-02-07 RX ADMIN — SODIUM CHLORIDE 3000 MILLILITER(S): 9 INJECTION, SOLUTION INTRAVENOUS at 09:49

## 2024-02-07 RX ADMIN — Medication 50 MILLIEQUIVALENT(S): at 14:21

## 2024-02-07 RX ADMIN — CASPOFUNGIN ACETATE 260 MILLIGRAM(S): 7 INJECTION, POWDER, LYOPHILIZED, FOR SOLUTION INTRAVENOUS at 11:31

## 2024-02-07 RX ADMIN — Medication 3 MILLILITER(S): at 14:37

## 2024-02-07 RX ADMIN — CHLORHEXIDINE GLUCONATE 1 APPLICATION(S): 213 SOLUTION TOPICAL at 11:15

## 2024-02-07 RX ADMIN — LEVETIRACETAM 500 MILLIGRAM(S): 250 TABLET, FILM COATED ORAL at 05:12

## 2024-02-07 RX ADMIN — Medication 50 MILLIEQUIVALENT(S): at 11:18

## 2024-02-07 RX ADMIN — Medication 1 APPLICATION(S): at 17:43

## 2024-02-07 RX ADMIN — Medication 1 DROP(S): at 17:43

## 2024-02-07 RX ADMIN — Medication 1 DROP(S): at 05:20

## 2024-02-07 RX ADMIN — Medication 12.5 MILLIGRAM(S): at 17:47

## 2024-02-07 RX ADMIN — LEVETIRACETAM 500 MILLIGRAM(S): 250 TABLET, FILM COATED ORAL at 17:46

## 2024-02-07 RX ADMIN — MIDODRINE HYDROCHLORIDE 20 MILLIGRAM(S): 2.5 TABLET ORAL at 11:16

## 2024-02-07 RX ADMIN — GABAPENTIN 300 MILLIGRAM(S): 400 CAPSULE ORAL at 17:47

## 2024-02-07 RX ADMIN — ENOXAPARIN SODIUM 50 MILLIGRAM(S): 100 INJECTION SUBCUTANEOUS at 17:45

## 2024-02-07 RX ADMIN — ENOXAPARIN SODIUM 50 MILLIGRAM(S): 100 INJECTION SUBCUTANEOUS at 05:16

## 2024-02-07 RX ADMIN — MEROPENEM 100 MILLIGRAM(S): 1 INJECTION INTRAVENOUS at 05:18

## 2024-02-07 RX ADMIN — SODIUM ZIRCONIUM CYCLOSILICATE 5 GRAM(S): 10 POWDER, FOR SUSPENSION ORAL at 21:27

## 2024-02-07 RX ADMIN — ALBUTEROL 2 PUFF(S): 90 AEROSOL, METERED ORAL at 08:16

## 2024-02-07 RX ADMIN — Medication 40 MILLIEQUIVALENT(S): at 11:14

## 2024-02-07 RX ADMIN — RALOXIFENE HYDROCHLORIDE 60 MILLIGRAM(S): 60 TABLET, COATED ORAL at 11:16

## 2024-02-07 NOTE — PROGRESS NOTE ADULT - ASSESSMENT
58F w/ PMHx Down Syndrome, nonverbal at baseline, DVT on eliquis 5mg BID,  Hypothyroidism, aspiration pneumonia in prev admissions, Cerebral palsy and Seizure Disorders presents to the ED from nursing facility/group home ([facility]) presented to ED for respiratory distress and high grade fever. Patient found to be septic on admission. Admitted for management of acute respiratory distress likely 2/2 CAP or aspiration PNA.    #Sepsis POA (Febrile, Tachycardic, Leukocytosis)  #Acute Hypoxic Respiratory Failure secondary to Aspiration PNA / RSV  #Hx of Dysphagia - Puree Diet at baseline  - On admission: VBG Lactate 2.1 improved to wnl, procal 0.04 pH wnl and CO2 mildly elevated 61  - MRSA negative   - BCx (1/20): Staph hominis repeat BCx have been NGTD  - UCx: Neg  - RVP: RSV detected  - MRSA swab neg, urine strep neg, urine legionella neg  - D-dimer 605 and LE duplex neg  - CXR pending Bibasilar opacities without significant change.  - Fungitell chronically positive, received Caspofungin on previous admission  - Repeat Flu/Covid/MRSA nares negative  - C/w Meropenem 1g IV q8hr per ID  - C/w Caspofungin 50mg IV QD per ID  - C/w NG feeds  - Will likely need PEG, ate minced and moist at group home now failing S+S, has NGT  - Spoke w/ La Nena Vasquez [phone],  of [employer] on 2/6 faxed over paperwork for PEG consent, GI notified. Will likely take 1+ week before consent is obtained  - F/u Palliative care    #Hypotension in setting of Septic Shock  - Monitor BP as patient is spiking fevers  - Keep MAP >60  - C/w Midodrine 20mg q8  - On low dose levophed    #Elevated Troponin (Stabilized)  #Sinus Tachycardia  - Troponins: 25>37>35>35  - EKG shows sinus tachycardia, repeated  - Could likely be from hypovolemia/infectious state  - Lower Lopressor to 12.5mg BID w/ hold parameters    #Acute on Chronic Normocytic Anemia (Stable)  #Iron Deficiency  - On admission: HgB 8.9 (previously 9.5)  - No evidence of hemorrhage  - B12 and Folate WNL normal  - Total Iron 15, Iron Saturation 6%, Ferritin 128 reflecting potential TIFFANI  - Hold off on iron supplementation in setting of suspected infection  - Keep HgB >7    #Hx of DVT of L Common Femoral Vein  - LE Duplex negative for DVT  - Lovenox BID    #Seizure Disorder  - C/w Keppra 500mg BID     #Hx of Right Foot OM (1st Toe Stump and 2nd Distal Phalanx)  #Hx of Multiple Left Foot DTIs  #Hx of Sacral Wound  - Previous wound cultures positive for Proteus and ESBL E coli  - Patient underwent excisional debridement of ulcer of right foot (including 1st metatarsal) and partial amputation 2nd toe on previous admissions  - No acute surgical interventions from podiatry and burn  - C/w q2hr repositioning  - C/w local wound care  - Podiatry recalled 2/5 for foot wounds, updated recs placed     #Down Syndrome  #Cerebral Palsy  #Non-Verbal  #Post-Menopausal Bone Loss  - C/w Gabapentin 300mg BID  - C/w Raloxifene 60mg QD    #Misc  #Code Status: Full Code  #DVT ppx: Lovenox  #GI prophylaxis: Protonix  #Diet: Tube feeds  #Disposition: SDU 58F w/ PMHx Down Syndrome, nonverbal at baseline, DVT on eliquis 5mg BID,  Hypothyroidism, aspiration pneumonia in prev admissions, Cerebral palsy and Seizure Disorders presents to the ED from nursing facility/group home ([facility]) presented to ED for respiratory distress and high grade fever. Patient found to be septic on admission. Admitted for management of acute respiratory distress likely 2/2 CAP or aspiration PNA.    #Sepsis POA (Febrile, Tachycardic, Leukocytosis)  #Acute Hypoxic Respiratory Failure secondary to Aspiration PNA / RSV  #Hx of Dysphagia - Puree Diet at baseline  - On admission: VBG Lactate 2.1 improved to wnl, procal 0.04 pH wnl and CO2 mildly elevated 61  - MRSA negative   - BCx (1/20): Staph hominis repeat BCx have been NGTD  - UCx: Neg  - RVP: RSV detected  - MRSA swab neg, urine strep neg, urine legionella neg  - D-dimer 605 and LE duplex neg  - CXR pending Bibasilar opacities without significant change.  - Fungitell chronically positive, received Caspofungin on previous admission  - Repeat Flu/Covid/MRSA nares negative  - C/w Duonebs q6hr  - C/w Meropenem 1g IV q8hr per ID  - C/w Caspofungin 50mg IV QD per ID  - C/w NG feeds  - Will likely need PEG, ate minced and moist at group home now failing S+S, has NGT  - Spoke w/ La Nena Vasquez [phone],  of [employer] on 2/6 faxed over paperwork for PEG consent, GI notified. Will likely take 1+ week before consent is obtained  - F/u Palliative care    #Hypotension in setting of Septic Shock  - Monitor BP as patient is spiking fevers  - Keep MAP >60  - C/w Midodrine 20mg q8  - On low dose levophed    #Elevated Troponin (Stabilized)  #Sinus Tachycardia  - Troponins: 25>37>35>35  - EKG shows sinus tachycardia, repeated  - Could likely be from hypovolemia/infectious state  - Lower Lopressor to 12.5mg BID w/ hold parameters    #Acute on Chronic Normocytic Anemia (Stable)  #Iron Deficiency  - On admission: HgB 8.9 (previously 9.5)  - No evidence of hemorrhage  - B12 and Folate WNL normal  - Total Iron 15, Iron Saturation 6%, Ferritin 128 reflecting potential TIFFANI  - Hold off on iron supplementation in setting of suspected infection  - Keep HgB >7    #Hx of DVT of L Common Femoral Vein  - LE Duplex negative for DVT  - Lovenox BID    #Seizure Disorder  - C/w Keppra 500mg BID     #Hx of Right Foot OM (1st Toe Stump and 2nd Distal Phalanx)  #Hx of Multiple Left Foot DTIs  #Hx of Sacral Wound  - Previous wound cultures positive for Proteus and ESBL E coli  - Patient underwent excisional debridement of ulcer of right foot (including 1st metatarsal) and partial amputation 2nd toe on previous admissions  - No acute surgical interventions from podiatry and burn  - C/w q2hr repositioning  - C/w local wound care  - Podiatry recalled 2/5 for foot wounds, updated recs placed     #Down Syndrome  #Cerebral Palsy  #Non-Verbal  #Post-Menopausal Bone Loss  - C/w Gabapentin 300mg BID  - C/w Raloxifene 60mg QD    #Misc  #Code Status: Full Code  #DVT ppx: Lovenox  #GI prophylaxis: Protonix  #Diet: Tube feeds  #Disposition: SDU 58F w/ PMHx Down Syndrome, nonverbal at baseline, DVT on eliquis 5mg BID,  Hypothyroidism, aspiration pneumonia in prev admissions, Cerebral palsy and Seizure Disorders presents to the ED from nursing facility/group home ([facility]) presented to ED for respiratory distress and high grade fever. Patient found to be septic on admission. Admitted for management of acute respiratory distress likely 2/2 CAP or aspiration PNA.    #Sepsis POA (Febrile, Tachycardic, Leukocytosis)  #Acute Hypoxic Respiratory Failure secondary to Aspiration PNA / RSV  #Hx of Dysphagia - Puree Diet at baseline  - On admission: VBG Lactate 2.1 improved to wnl, procal 0.04 pH wnl and CO2 mildly elevated 61  - MRSA negative   - BCx (1/20): Staph hominis repeat BCx have been NGTD  - UCx: Neg  - RVP: RSV detected  - MRSA swab neg, urine strep neg, urine legionella neg  - D-dimer 605 and LE duplex neg  - CXR pending Bibasilar opacities without significant change.  - Fungitell chronically positive, received Caspofungin on previous admission  - Repeat Flu/Covid/MRSA nares negative  - C/w Duonebs q6hr  - C/w Meropenem 1g IV q8hr per ID  - C/w Caspofungin 50mg IV QD per ID  - C/w NG feeds  - Will likely need PEG, ate minced and moist at group home now failing S+S, has NGT  - Spoke w/ La Nena Vasquez [phone],  of [employer] on 2/6 faxed over paperwork for PEG consent, GI notified. Will likely take 1+ week before consent is obtained  - F/u Palliative care    #Hypotension in setting of Septic Shock  - Monitor BP as patient is spiking fevers  - Keep MAP >60  - C/w Midodrine 20mg q8  - On low dose levophed    #Elevated Troponin (Stabilized)  #Sinus Tachycardia  - Troponins: 25>37>35>35  - EKG shows sinus tachycardia, repeated  - Could likely be from hypovolemia/infectious state  - Lower Lopressor to 12.5mg BID w/ hold parameters    #Acute on Chronic Normocytic Anemia (Stable)  #Iron Deficiency  - On admission: HgB 8.9 (previously 9.5)  - No evidence of hemorrhage  - B12 and Folate WNL normal  - Total Iron 15, Iron Saturation 6%, Ferritin 128 reflecting potential TIFFANI  - Hold off on iron supplementation in setting of suspected infection  - Keep HgB >7    #Hx of DVT of L Common Femoral Vein  - LE Duplex negative for DVT  - Lovenox BID    #Seizure Disorder  - C/w Keppra 500mg BID     #Hx of Right Foot OM (1st Toe Stump and 2nd Distal Phalanx)  #Hx of Multiple Left Foot DTIs  #Hx of Sacral Wound  - Previous wound cultures positive for Proteus and ESBL E coli  - Patient underwent excisional debridement of ulcer of right foot (including 1st metatarsal) and partial amputation 2nd toe on previous admissions  - No acute surgical interventions from podiatry and burn  - C/w q2hr repositioning  - C/w local wound care  - Podiatry recalled 2/5 for foot wounds, updated recs placed     #Down Syndrome  #Cerebral Palsy  - C/w Gabapentin 300mg BID  - C/w Raloxifene 60mg QD    #Misc  #Code Status: Full Code  #DVT ppx: Lovenox  #GI prophylaxis: Protonix  #Diet: Tube feeds  #Disposition: SDU 58F w/ PMHx Down Syndrome, nonverbal at baseline, DVT on eliquis 5mg BID,  Hypothyroidism, aspiration pneumonia in prev admissions, Cerebral palsy and Seizure Disorders presents to the ED from nursing facility/group home ([facility]) presented to ED for respiratory distress and high grade fever. Patient found to be septic on admission. Admitted for management of acute respiratory distress likely 2/2 CAP or aspiration PNA.    #Sepsis POA (Febrile, Tachycardic, Leukocytosis)  #Acute Hypoxic Respiratory Failure secondary to Aspiration PNA / RSV  #Hx of Dysphagia - Puree Diet at baseline  - On admission: VBG Lactate 2.1 improved to wnl, procal 0.04 pH wnl and CO2 mildly elevated 61  - MRSA negative   - BCx (1/20): Staph hominis repeat BCx have been NGTD  - UCx: Neg  - RVP: RSV detected  - MRSA swab neg, urine strep neg, urine legionella neg  - D-dimer 605 and LE duplex neg  - CXR pending Bibasilar opacities without significant change.  - Fungitell chronically positive, received Caspofungin on previous admission  - Repeat Flu/Covid/MRSA nares negative  - C/w Duonebs q6hr  - C/w Meropenem 1g IV q8hr per ID  - C/w Caspofungin 50mg IV QD per ID  - C/w NG feeds  - Will likely need PEG, ate minced and moist at group home now failing S+S, has NGT  - Spoke w/ La Nena Vasquez [phone],  of [employer] on 2/6 faxed over paperwork for PEG consent, GI notified. Will likely take 1+ week before consent is obtained  - F/u Palliative care    #Hypotension in setting of Septic Shock  - Monitor BP as patient is spiking fevers  - Keep MAP >60  - C/w Midodrine 20mg q8  - On low dose levophed    #Elevated Troponin (Stabilized)  #Sinus Tachycardia (Controlled)  - Troponins: 25>37>35>35  - EKG shows sinus tachycardia, repeated  - Could likely be from hypovolemia/infectious state  - Lower Lopressor to 12.5mg BID w/ hold parameters    #Acute on Chronic Iron Deficiency Anemia (Stable)  - On admission: HgB 8.9 (previously 9.5)  - No evidence of hemorrhage  - B12 and Folate WNL normal  - Total Iron 15, Iron Saturation 6%, Ferritin 128 reflecting potential TIFFANI  - Hold off on iron supplementation in setting of suspected infection  - Keep HgB >7    #Hx of DVT of L Common Femoral Vein  - LE Duplex negative for DVT  - C/w Lovenox 50mg q12hr    #Seizure Disorder  - C/w Keppra 500mg BID     #Hx of Right Foot OM (1st Toe Stump and 2nd Distal Phalanx)  #Hx of Multiple Left Foot DTIs  #Hx of Sacral Wound  - Previous wound cultures positive for Proteus and ESBL E coli  - Patient underwent excisional debridement of ulcer of right foot (including 1st metatarsal) and partial amputation 2nd toe on previous admissions  - No acute surgical interventions from podiatry and burn  - C/w q2hr repositioning  - C/w local wound care  - Podiatry recalled 2/5 for foot wounds, updated recs placed     #Down Syndrome  #Cerebral Palsy  - C/w Gabapentin 300mg BID  - C/w Raloxifene 60mg QD    #Misc  #Code Status: Full Code  #DVT ppx: Lovenox BID  #GI prophylaxis: Protonix  #Diet: Tube feeds  #Disposition: SDU

## 2024-02-07 NOTE — PROGRESS NOTE ADULT - SUBJECTIVE AND OBJECTIVE BOX
Patient is a 58y old  Female who presents with a chief complaint of Respiratory Distress (06 Feb 2024 16:43)    Over Night Events: Levo restarted, s/p 1L LR, febrile to 100.7    ROS:     All ROS are negative except HPI     PHYSICAL EXAM    ICU Vital Signs Last 24 Hrs  T(C): 37.9 (07 Feb 2024 04:00), Max: 38.2 (06 Feb 2024 16:00)  T(F): 100.3 (07 Feb 2024 04:00), Max: 100.7 (06 Feb 2024 16:00)  HR: 74 (07 Feb 2024 08:00) (60 - 95)  BP: 99/51 (07 Feb 2024 08:00) (79/43 - 116/56)  BP(mean): 72 (07 Feb 2024 08:00) (55 - 80)  ABP: --  ABP(mean): --  RR: 18 (07 Feb 2024 04:00) (18 - 20)  SpO2: 98% (07 Feb 2024 08:00) (92% - 99%)    O2 Parameters below as of 07 Feb 2024 03:00  Patient On (Oxygen Delivery Method): nasal cannula, high flow  O2 Flow (L/min): 60  O2 Concentration (%): 90        CONSTITUTIONAL:  NAD    CARDIAC:   Normal rate,   Regular rhythm.    No edema    RESPIRATORY:   BL rhonchi    GASTROINTESTINAL:  Abdomen soft,   Non-tender  No guarding    NEUROLOGICAL:   Lethargic    SKIN:   stage 2 pressure ulcer      02-06-24 @ 07:01  -  02-07-24 @ 07:00  --------------------------------------------------------  IN:    Enteral Tube Flush: 450 mL    Jevity 1.2: 900 mL    Lactated Ringers Bolus: 500 mL    Norepinephrine: 0.2 mL  Total IN: 1850.2 mL    OUT:    Voided (mL): 760 mL  Total OUT: 760 mL    Total NET: 1090.2 mL      LABS:                          8.8    16.32 )-----------( 364      ( 06 Feb 2024 04:43 )             29.5                                               02-06    146  |  103  |  14  ----------------------------<  174<H>  3.4<L>   |  33<H>  |  0.5<L>    Ca    8.7      06 Feb 2024 04:43  Mg     2.0     02-06    TPro  5.8<L>  /  Alb  2.5<L>  /  TBili  0.2  /  DBili  x   /  AST  19  /  ALT  16  /  AlkPhos  81  02-06                                             Urinalysis Basic - ( 06 Feb 2024 04:43 )    Color: x / Appearance: x / SG: x / pH: x  Gluc: 174 mg/dL / Ketone: x  / Bili: x / Urobili: x   Blood: x / Protein: x / Nitrite: x   Leuk Esterase: x / RBC: x / WBC x   Sq Epi: x / Non Sq Epi: x / Bacteria: x                                                  LIVER FUNCTIONS - ( 06 Feb 2024 04:43 )  Alb: 2.5 g/dL / Pro: 5.8 g/dL / ALK PHOS: 81 U/L / ALT: 16 U/L / AST: 19 U/L / GGT: x                                                                                                                                   ABG - ( 07 Feb 2024 08:02 )  pH, Arterial: 7.49  pH, Blood: x     /  pCO2: 47    /  pO2: 122   / HCO3: 36    / Base Excess: 10.9  /  SaO2: 99.4                MEDICATIONS  (STANDING):  albuterol    90 MICROgram(s) HFA Inhaler 2 Puff(s) Inhalation two times a day  albuterol/ipratropium for Nebulization 3 milliLiter(s) Nebulizer four times a day  artificial  tears Solution 1 Drop(s) Both EYES two times a day  calcium carbonate   1250 mG (OsCal) 1 Tablet(s) Oral two times a day  caspofungin IVPB 50 milliGRAM(s) IV Intermittent every 24 hours  caspofungin IVPB      chlorhexidine 2% Cloths 1 Application(s) Topical daily  dextrose 5%. 1000 milliLiter(s) (75 mL/Hr) IV Continuous <Continuous>  enoxaparin Injectable 50 milliGRAM(s) SubCutaneous every 12 hours  gabapentin 300 milliGRAM(s) Oral every 12 hours  levETIRAcetam  Solution 500 milliGRAM(s) Oral every 12 hours  melatonin 3 milliGRAM(s) Oral at bedtime  meropenem  IVPB 1000 milliGRAM(s) IV Intermittent every 8 hours  metoprolol tartrate 25 milliGRAM(s) Oral two times a day  midodrine. 20 milliGRAM(s) Oral three times a day  norepinephrine Infusion 0.05 MICROgram(s)/kG/Min (4.9 mL/Hr) IV Continuous <Continuous>  pantoprazole  Injectable 40 milliGRAM(s) IV Push every 24 hours  polyethylene glycol 3350 17 Gram(s) Oral at bedtime  raloxifene 60 milliGRAM(s) Oral daily  scopolamine 1 mG/72 Hr(s) Patch 1 Patch Transdermal every 72 hours  senna 2 Tablet(s) Oral at bedtime  silver sulfADIAZINE 1% Cream 1 Application(s) Topical two times a day    MEDICATIONS  (PRN):  acetaminophen     Tablet .. 650 milliGRAM(s) Oral once PRN Temp greater or equal to 38.5C (101.3F)  acetaminophen     Tablet .. 650 milliGRAM(s) Oral every 6 hours PRN Temp greater or equal to 38C (100.4F), Mild Pain (1 - 3)  aluminum hydroxide/magnesium hydroxide/simethicone Suspension 30 milliLiter(s) Oral every 4 hours PRN Dyspepsia  ondansetron Injectable 4 milliGRAM(s) IV Push every 8 hours PRN Nausea and/or Vomiting  sodium chloride 0.65% Nasal 1 Spray(s) Both Nostrils daily PRN Nasal Congestion      New X-rays reviewed:                                                                                  ECHO     Patient is a 58y old  Female who presents with a chief complaint of Respiratory Distress (06 Feb 2024 16:43)    Over Night Events: Remains critically ill oN HFNCO2.  On Levophed.     ROS:     All ROS are negative except HPI     PHYSICAL EXAM    ICU Vital Signs Last 24 Hrs  T(C): 37.9 (07 Feb 2024 04:00), Max: 38.2 (06 Feb 2024 16:00)  T(F): 100.3 (07 Feb 2024 04:00), Max: 100.7 (06 Feb 2024 16:00)  HR: 74 (07 Feb 2024 08:00) (60 - 95)  BP: 99/51 (07 Feb 2024 08:00) (79/43 - 116/56)  BP(mean): 72 (07 Feb 2024 08:00) (55 - 80)  ABP: --  ABP(mean): --  RR: 18 (07 Feb 2024 04:00) (18 - 20)  SpO2: 98% (07 Feb 2024 08:00) (92% - 99%)    O2 Parameters below as of 07 Feb 2024 03:00  Patient On (Oxygen Delivery Method): nasal cannula, high flow  O2 Flow (L/min): 60  O2 Concentration (%): 90        CONSTITUTIONAL:  NAD    CARDIAC:   Normal rate,   Regular rhythm.    No edema    RESPIRATORY:   Bilateral  rhonchi    GASTROINTESTINAL:  Abdomen soft,   Non-tender  No guarding    NEUROLOGICAL:   Lethargic    SKIN:   stage 2 pressure ulcer      02-06-24 @ 07:01  -  02-07-24 @ 07:00  --------------------------------------------------------  IN:    Enteral Tube Flush: 450 mL    Jevity 1.2: 900 mL    Lactated Ringers Bolus: 500 mL    Norepinephrine: 0.2 mL  Total IN: 1850.2 mL    OUT:    Voided (mL): 760 mL  Total OUT: 760 mL    Total NET: 1090.2 mL      LABS:                          8.8    16.32 )-----------( 364      ( 06 Feb 2024 04:43 )             29.5                                               02-06    146  |  103  |  14  ----------------------------<  174<H>  3.4<L>   |  33<H>  |  0.5<L>    Ca    8.7      06 Feb 2024 04:43  Mg     2.0     02-06    TPro  5.8<L>  /  Alb  2.5<L>  /  TBili  0.2  /  DBili  x   /  AST  19  /  ALT  16  /  AlkPhos  81  02-06                                             Urinalysis Basic - ( 06 Feb 2024 04:43 )    Color: x / Appearance: x / SG: x / pH: x  Gluc: 174 mg/dL / Ketone: x  / Bili: x / Urobili: x   Blood: x / Protein: x / Nitrite: x   Leuk Esterase: x / RBC: x / WBC x   Sq Epi: x / Non Sq Epi: x / Bacteria: x                                                  LIVER FUNCTIONS - ( 06 Feb 2024 04:43 )  Alb: 2.5 g/dL / Pro: 5.8 g/dL / ALK PHOS: 81 U/L / ALT: 16 U/L / AST: 19 U/L / GGT: x                                                                                                                                   ABG - ( 07 Feb 2024 08:02 )  pH, Arterial: 7.49  pH, Blood: x     /  pCO2: 47    /  pO2: 122   / HCO3: 36    / Base Excess: 10.9  /  SaO2: 99.4                MEDICATIONS  (STANDING):  albuterol    90 MICROgram(s) HFA Inhaler 2 Puff(s) Inhalation two times a day  albuterol/ipratropium for Nebulization 3 milliLiter(s) Nebulizer four times a day  artificial  tears Solution 1 Drop(s) Both EYES two times a day  calcium carbonate   1250 mG (OsCal) 1 Tablet(s) Oral two times a day  caspofungin IVPB 50 milliGRAM(s) IV Intermittent every 24 hours  caspofungin IVPB      chlorhexidine 2% Cloths 1 Application(s) Topical daily  dextrose 5%. 1000 milliLiter(s) (75 mL/Hr) IV Continuous <Continuous>  enoxaparin Injectable 50 milliGRAM(s) SubCutaneous every 12 hours  gabapentin 300 milliGRAM(s) Oral every 12 hours  levETIRAcetam  Solution 500 milliGRAM(s) Oral every 12 hours  melatonin 3 milliGRAM(s) Oral at bedtime  meropenem  IVPB 1000 milliGRAM(s) IV Intermittent every 8 hours  metoprolol tartrate 25 milliGRAM(s) Oral two times a day  midodrine. 20 milliGRAM(s) Oral three times a day  norepinephrine Infusion 0.05 MICROgram(s)/kG/Min (4.9 mL/Hr) IV Continuous <Continuous>  pantoprazole  Injectable 40 milliGRAM(s) IV Push every 24 hours  polyethylene glycol 3350 17 Gram(s) Oral at bedtime  raloxifene 60 milliGRAM(s) Oral daily  scopolamine 1 mG/72 Hr(s) Patch 1 Patch Transdermal every 72 hours  senna 2 Tablet(s) Oral at bedtime  silver sulfADIAZINE 1% Cream 1 Application(s) Topical two times a day    MEDICATIONS  (PRN):  acetaminophen     Tablet .. 650 milliGRAM(s) Oral once PRN Temp greater or equal to 38.5C (101.3F)  acetaminophen     Tablet .. 650 milliGRAM(s) Oral every 6 hours PRN Temp greater or equal to 38C (100.4F), Mild Pain (1 - 3)  aluminum hydroxide/magnesium hydroxide/simethicone Suspension 30 milliLiter(s) Oral every 4 hours PRN Dyspepsia  ondansetron Injectable 4 milliGRAM(s) IV Push every 8 hours PRN Nausea and/or Vomiting  sodium chloride 0.65% Nasal 1 Spray(s) Both Nostrils daily PRN Nasal Congestion      New X-rays reviewed:                                                                                  ECHO

## 2024-02-07 NOTE — PROGRESS NOTE ADULT - SUBJECTIVE AND OBJECTIVE BOX
Interval History  Patient is a 58y old Female who presents with a chief complaint of Respiratory Distress (07 Feb 2024 10:30)    TEA ECHAVARRIA is admitted with a primary diagnosis of Sepsis with acute hypoxic respiratory failure      Today is hospital day 18d. The patient was examined at the bedside this morning. No acute overnight events were reported.    Admission HPI  HPI:  58F w/ PMHx Down Syndrome, nonverbal at baseline, Hypothyroidism, Cerebral palsy and Seizure Disorders presents to the ED from nursing facility/group home ([facility]) presented to ED for respiratory distress and high grade fever. Patient found to be septic on admission. As per aide Janette at bedside, patient had been coughing and congested for 3x days. Denies fevers, chills, n/v/d or pain prior to admission. Patient is on a puree diet at baseline.    Vitals: Temp 104.5F (rectal), /74, , RR 24, SpO2 100% on BiPAP    Labs: Hgb 9.5 (previously 11.1), Platelet 422, INR 1.43, VBG Lactate 2.1    Imaging: CXR shows possible RML infiltrate    In the ED:  - s/p Cefepime 2g IV x1  - s/p Levaquin 750mg IV x1  - s/p 2L LR bolus    Admitted to SDU for management of acute respiratory distress 2/2 CAP/Aspiration PNA (20 Jan 2024 18:22)      Past Medical and Surgical History  Down syndrome    Osteoporosis    Mild anemia    Neuropathy    S/P debridement  of R hip on 3/2/21      Family History  FAMILY HISTORY:    Social History  Social History:      Allergies  No Known Allergies    Medications  Standing Medications  albuterol    90 MICROgram(s) HFA Inhaler 2 Puff(s) Inhalation two times a day  albuterol/ipratropium for Nebulization 3 milliLiter(s) Nebulizer every 6 hours  artificial  tears Solution 1 Drop(s) Both EYES two times a day  calcium carbonate   1250 mG (OsCal) 1 Tablet(s) Oral two times a day  caspofungin IVPB      caspofungin IVPB 50 milliGRAM(s) IV Intermittent every 24 hours  chlorhexidine 2% Cloths 1 Application(s) Topical daily  enoxaparin Injectable 50 milliGRAM(s) SubCutaneous every 12 hours  gabapentin 300 milliGRAM(s) Oral every 12 hours  levETIRAcetam  Solution 500 milliGRAM(s) Oral every 12 hours  melatonin 3 milliGRAM(s) Oral at bedtime  meropenem  IVPB 1000 milliGRAM(s) IV Intermittent every 8 hours  metoprolol tartrate 12.5 milliGRAM(s) Oral two times a day  midodrine. 20 milliGRAM(s) Oral three times a day  norepinephrine Infusion 0.05 MICROgram(s)/kG/Min IV Continuous <Continuous>  pantoprazole  Injectable 40 milliGRAM(s) IV Push every 24 hours  polyethylene glycol 3350 17 Gram(s) Oral at bedtime  potassium chloride  20 mEq/100 mL IVPB 20 milliEquivalent(s) IV Intermittent every 2 hours  raloxifene 60 milliGRAM(s) Oral daily  scopolamine 1 mG/72 Hr(s) Patch 1 Patch Transdermal every 72 hours  senna 2 Tablet(s) Oral at bedtime  silver sulfADIAZINE 1% Cream 1 Application(s) Topical two times a day    PRN Medications  acetaminophen     Tablet .. 650 milliGRAM(s) Oral once PRN  acetaminophen     Tablet .. 650 milliGRAM(s) Oral every 6 hours PRN  aluminum hydroxide/magnesium hydroxide/simethicone Suspension 30 milliLiter(s) Oral every 4 hours PRN  ondansetron Injectable 4 milliGRAM(s) IV Push every 8 hours PRN  sodium chloride 0.65% Nasal 1 Spray(s) Both Nostrils daily PRN    Vitals  T(F): 100.3  HR: 72  BP: 111/53  RR: 20  SpO2: 97%    I&O's    02-06-24 @ 07:01 - 02-07-24 @ 07:00  --------------------------------------------------------  IN: 1850.2 mL / OUT: 760 mL / NET: 1090.2 mL    02-07-24 @ 07:01  -  02-07-24 @ 12:58  --------------------------------------------------------  IN: 1300.5 mL / OUT: 0 mL / NET: 1300.5 mL      Physical Examination  General: Lethargic  Head: NCAT  Cardiac: Regular rate and rhythm  Pulmonary: Decreased breath sounds b/l  Abdominal: Soft, nontender, and nondistended with positive bowel sounds  Extremities: No pitting edema  Neurologic: Nonverbal at baseline, lethargic    Labs                        8.3    9.76  )-----------( 332      ( 07 Feb 2024 05:36 )             27.5     02-07    146  |  105  |  15  ----------------------------<  136<H>  2.9<L>   |  32  |  0.5<L>    Ca    8.2<L>      07 Feb 2024 05:36  Mg     2.1     02-07    TPro  5.3<L>  /  Alb  2.4<L>  /  TBili  <0.2  /  DBili  x   /  AST  18  /  ALT  15  /  AlkPhos  80  02-07      Urinalysis Basic - ( 07 Feb 2024 05:36 )    Color: x / Appearance: x / SG: x / pH: x  Gluc: 136 mg/dL / Ketone: x  / Bili: x / Urobili: x   Blood: x / Protein: x / Nitrite: x   Leuk Esterase: x / RBC: x / WBC x   Sq Epi: x / Non Sq Epi: x / Bacteria: x      CAPILLARY BLOOD GLUCOSE        ABG - ( 07 Feb 2024 08:02 )  pH, Arterial: 7.49  pH, Blood: x     /  pCO2: 47    /  pO2: 122   / HCO3: 36    / Base Excess: 10.9  /  SaO2: 99.4                Imaging  CXR  Xray Chest 1 View- PORTABLE-Urgent:   ACC: [id number] EXAM:  XR CHEST PORTABLE URGENT 1V   ORDERED BY: SEVERINO PETERS     PROCEDURE DATE:  02/06/2024          INTERPRETATION:  Clinical History / Reason for exam: Enteric tube   placement    Comparison : Chest radiograph February 6, 2024.    Technique/Positioning: Single frontal chest x-ray obtained.    Findings:    Support devices: Enteric tube extends below left hemidiaphragm    Cardiac/mediastinum/hilum: Unchanged.    Lung parenchyma/Pleura: Unchanged bilateral opacities.    Skeleton/soft tissues: Unchanged.    Impression:    Unchanged bilateral opacities.  Enteric tube extends below left hemidiaphragm.    --- End of Report ---            SHYANN GREENE MD; Attending Radiologist  This document has been electronically signed. Feb 62024  2:33PM (02-06-24 @ 13:23)  Xray Chest 1 View- PORTABLE-Urgent:   ACC: [id number] EXAM:  XR CHEST PORTABLE URGENT 1V   ORDERED BY: SEVERINO PETERS     PROCEDURE DATE:  02/05/2024          INTERPRETATION:  Clinical History / Reason for exam: Nasogastric tube.    Comparison : Chest radiograph dated February 5, 2024.    Technique/Positioning: Portable frontal.    Findings:    Support devices: Nasogastric tube in place. Overlying EKG leads.    Cardiac/mediastinum/hilum: Stable.    Lung parenchyma/Pleura: Decreased patchy bilateral opacities, left   greater than right. No pneumothorax.    Skeleton/soft tissues: Stable.    Impression:    1. Decreased patchy bilateral opacities, left greater than right.    --- End of Report ---            STEVIE IRAHETA MD; Attending Radiologist  This document has been electronicallysigned. Feb 5 2024  3:49PM (02-05-24 @ 11:50)      CT

## 2024-02-07 NOTE — PROGRESS NOTE ADULT - SUBJECTIVE AND OBJECTIVE BOX
TEA ECHAVARRIA  58y Female    CHIEF COMPLAINT:    Patient is a 58y old  Female who presents with a chief complaint of Respiratory Distress (2024 08:11)    INTERVAL HPI/OVERNIGHT EVENTS:    Patient seen and examined. No acute events overnight. remains on HFNC,critically ill    ROS: All other systems are negative.    Vital Signs:    T(F): 100.3 (24 @ 04:00), Max: 100.7 (24 @ 16:00)  HR: 59 (24 @ 08:45) (59 - 95)  BP: 100/50 (24 @ 08:45) (79/43 - 116/56)  RR: 18 (24 @ 04:00) (18 - 20)  SpO2: 99% (24 @ 08:45) (92% - 99%)    2024 07:01  -  2024 07:00  --------------------------------------------------------  IN: 1850.2 mL / OUT: 760 mL / NET: 1090.2 mL    Daily Weight in k (2024 00:00)    PHYSICAL EXAM:    GENERAL:  NAD chronically ill appearing   SKIN: No rashes or lesions  HEENT: Atraumatic. Normocephalic.   NECK: Supple, No JVD.    PULMONARY: decreased breath sounds B/L. No wheezing.   CVS: Normal S1, S2. Rate and Rhythm are regular.   ABDOMEN/GI: Soft, Nontender, Nondistended   MSK:  No clubbing or cyanosis   NEUROLOGIC: does not follow commands   PSYCH: awake, non verbal     Consultant(s) Notes Reviewed:  [x ] YES  [ ] NO  Care Discussed with Consultants/Other Providers [ x] YES  [ ] NO    LABS:                        8.8    16.32 )-----------( 364      ( 2024 04:43 )             29.5     146  |  105  |  15  ----------------------------<  136<H>  2.9<L>   |  32  |  0.5<L>    Ca    8.2<L>      2024 05:36  Mg     2.1         TPro  5.3<L>  /  Alb  2.4<L>  /  TBili  <0.2  /  DBili  x   /  AST  18  /  ALT  15  /  AlkPhos  80      RADIOLOGY & ADDITIONAL TESTS:    Imaging or report Personally Reviewed:  [x] YES  [ ] NO  EKG reviewed: [x] YES  [ ] NO    Medications:  Standing  albuterol    90 MICROgram(s) HFA Inhaler 2 Puff(s) Inhalation two times a day  albuterol/ipratropium for Nebulization 3 milliLiter(s) Nebulizer four times a day  artificial  tears Solution 1 Drop(s) Both EYES two times a day  calcium carbonate   1250 mG (OsCal) 1 Tablet(s) Oral two times a day  caspofungin IVPB 50 milliGRAM(s) IV Intermittent every 24 hours  caspofungin IVPB      chlorhexidine 2% Cloths 1 Application(s) Topical daily  enoxaparin Injectable 50 milliGRAM(s) SubCutaneous every 12 hours  gabapentin 300 milliGRAM(s) Oral every 12 hours  lactated ringers Bolus 500 milliLiter(s) IV Bolus once  levETIRAcetam  Solution 500 milliGRAM(s) Oral every 12 hours  melatonin 3 milliGRAM(s) Oral at bedtime  meropenem  IVPB 1000 milliGRAM(s) IV Intermittent every 8 hours  metoprolol tartrate 12.5 milliGRAM(s) Oral two times a day  midodrine. 20 milliGRAM(s) Oral three times a day  norepinephrine Infusion 0.05 MICROgram(s)/kG/Min IV Continuous <Continuous>  pantoprazole  Injectable 40 milliGRAM(s) IV Push every 24 hours  polyethylene glycol 3350 17 Gram(s) Oral at bedtime  raloxifene 60 milliGRAM(s) Oral daily  scopolamine 1 mG/72 Hr(s) Patch 1 Patch Transdermal every 72 hours  senna 2 Tablet(s) Oral at bedtime  silver sulfADIAZINE 1% Cream 1 Application(s) Topical two times a day    PRN Meds  acetaminophen     Tablet .. 650 milliGRAM(s) Oral once PRN  acetaminophen     Tablet .. 650 milliGRAM(s) Oral every 6 hours PRN  aluminum hydroxide/magnesium hydroxide/simethicone Suspension 30 milliLiter(s) Oral every 4 hours PRN  ondansetron Injectable 4 milliGRAM(s) IV Push every 8 hours PRN  sodium chloride 0.65% Nasal 1 Spray(s) Both Nostrils daily PRN

## 2024-02-07 NOTE — PROGRESS NOTE ADULT - SUBJECTIVE AND OBJECTIVE BOX
TEA ECHAVARRIA  58y, Female  Allergy: No Known Allergies      LOS  18d    CHIEF COMPLAINT: Respiratory Distress (07 Feb 2024 08:58)      INTERVAL EVENTS/HPI  - low grade fever, WBC downtrending   - T(F): , Max: 100.7 (02-06-24 @ 16:00)  - Tolerating medication  - WBC Count: 9.76 (02-07-24 @ 05:36)  WBC Count: 16.32 (02-06-24 @ 04:43)     - Creatinine: 0.5 (02-07-24 @ 05:36)  Creatinine: 0.5 (02-06-24 @ 04:43)       ROS  ***    VITALS:  T(F): 100.3, Max: 100.7 (02-06-24 @ 16:00)  HR: 54  BP: 109/52  RR: 18Vital Signs Last 24 Hrs  T(C): 37.9 (07 Feb 2024 04:00), Max: 38.2 (06 Feb 2024 16:00)  T(F): 100.3 (07 Feb 2024 04:00), Max: 100.7 (06 Feb 2024 16:00)  HR: 54 (07 Feb 2024 09:00) (54 - 95)  BP: 109/52 (07 Feb 2024 09:00) (79/43 - 109/52)  BP(mean): 75 (07 Feb 2024 09:00) (55 - 75)  RR: 18 (07 Feb 2024 04:00) (18 - 20)  SpO2: 99% (07 Feb 2024 09:00) (92% - 99%)    Parameters below as of 07 Feb 2024 08:19  Patient On (Oxygen Delivery Method): nasal cannula, high flow  O2 Flow (L/min): 60  O2 Concentration (%): 80    PHYSICAL EXAM:  ***    FH: Non-contributory  Social Hx: Non-contributory    TESTS & MEASUREMENTS:                        8.3    9.76  )-----------( 332      ( 07 Feb 2024 05:36 )             27.5     02-07    146  |  105  |  15  ----------------------------<  136<H>  2.9<L>   |  32  |  0.5<L>    Ca    8.2<L>      07 Feb 2024 05:36  Mg     2.1     02-07    TPro  5.3<L>  /  Alb  2.4<L>  /  TBili  <0.2  /  DBili  x   /  AST  18  /  ALT  15  /  AlkPhos  80  02-07      LIVER FUNCTIONS - ( 07 Feb 2024 05:36 )  Alb: 2.4 g/dL / Pro: 5.3 g/dL / ALK PHOS: 80 U/L / ALT: 15 U/L / AST: 18 U/L / GGT: x           Urinalysis Basic - ( 07 Feb 2024 05:36 )    Color: x / Appearance: x / SG: x / pH: x  Gluc: 136 mg/dL / Ketone: x  / Bili: x / Urobili: x   Blood: x / Protein: x / Nitrite: x   Leuk Esterase: x / RBC: x / WBC x   Sq Epi: x / Non Sq Epi: x / Bacteria: x        Culture - Blood (collected 02-03-24 @ 11:13)  Source: .Blood None  Preliminary Report (02-06-24 @ 20:01):    No growth at 72 Hours    Culture - Blood (collected 02-01-24 @ 11:58)  Source: .Blood None  Final Report (02-06-24 @ 23:06):    No growth at 5 days    Culture - Urine (collected 02-01-24 @ 11:40)  Source: Clean Catch Clean Catch (Midstream)  Final Report (02-03-24 @ 00:06):    >=3 organisms. Probable collection contamination.    Culture - Blood (collected 01-31-24 @ 18:21)  Source: .Blood None  Final Report (02-06-24 @ 01:01):    No growth at 5 days    Culture - Blood (collected 01-31-24 @ 18:21)  Source: .Blood None  Final Report (02-06-24 @ 01:01):    No growth at 5 days    Culture - Urine (collected 01-23-24 @ 05:20)  Source: Clean Catch Clean Catch (Midstream)  Final Report (01-25-24 @ 00:26):    No growth    Culture - Blood (collected 01-22-24 @ 16:51)  Source: .Blood None  Final Report (01-28-24 @ 01:00):    No growth at 5 days    Culture - Urine (collected 01-21-24 @ 05:00)  Source: Clean Catch Clean Catch (Midstream)  Final Report (01-22-24 @ 07:15):    No growth    Culture - Blood (collected 01-20-24 @ 16:15)  Source: .Blood Blood-Peripheral  Gram Stain (01-21-24 @ 20:33):    Growth in aerobic bottle: Gram positive cocci in pairs  Final Report (01-22-24 @ 19:04):    Growth in aerobic bottle: Staphylococcus hominis    Isolation of Coagulase negative Staphylococcus from single blood culture    sets may represent    contamination. Contact the Microbiology Department at [phone] if    susceptibility testing is    clinically indicated.    Direct identification is available within approximately 3-5    hours either by Blood Panel Multiplexed PCR or Direct    MALDI-TOF. Details: [email or web address]  Organism: Blood Culture PCR (01-22-24 @ 19:04)  Organism: Blood Culture PCR (01-22-24 @ 19:04)      Method Type: PCR      -  Coagulase negative Staphylococcus: Detec    Culture - Blood (collected 01-20-24 @ 16:15)  Source: .Blood Blood-Peripheral  Final Report (01-25-24 @ 23:00):    No growth at 5 days        Lactate, Blood: <0.2 mmol/L (02-03-24 @ 06:25)      INFECTIOUS DISEASES TESTING  Rapid RVP Result: NotDetec (02-04-24 @ 10:28)  MRSA PCR Result.: Negative (02-03-24 @ 21:32)  Procalcitonin, Serum: 0.15 (02-03-24 @ 11:13)  Procalcitonin, Serum: 0.22 (02-01-24 @ 16:00)  MRSA PCR Result.: Negative (01-22-24 @ 05:30)  Streptococcus pneumoniae Ag, Ur Result: Negative (01-21-24 @ 05:00)  Legionella Antigen, Urine: Negative (01-21-24 @ 05:00)  Rapid RVP Result: Detected (01-21-24 @ 00:58)  Procalcitonin, Serum: 0.51 (01-20-24 @ 21:23)  Fungitell: 325 (01-20-24 @ 21:23)  Vancomycin Level, Trough: 5.9 (11-12-23 @ 17:55)  Fungitell: 138 (11-07-23 @ 08:08)  Fungitell: 115 (11-02-23 @ 11:40)  Procalcitonin, Serum: 0.04 (11-02-23 @ 11:40)  Procalcitonin, Serum: 0.26 (10-24-23 @ 11:00)  MRSA PCR Result.: Negative (10-17-23 @ 17:20)  Fungitell: >500 (10-17-23 @ 17:20)  Procalcitonin, Serum: 4.36 (10-17-23 @ 17:20)  Procalcitonin, Serum: 4.18 (10-17-23 @ 12:35)  Procalcitonin, Serum: 0.07 (10-15-23 @ 07:28)  COVID-19 PCR: NotDetec (10-12-23 @ 09:14)  Procalcitonin, Serum: 0.40 (10-07-23 @ 10:54)  Streptococcus pneumoniae Ag, Ur Result: Negative (09-30-23 @ 14:36)  Legionella Antigen, Urine: Negative (09-30-23 @ 14:36)  MRSA PCR Result.: Negative (09-29-23 @ 16:50)  Procalcitonin, Serum: 9.78 (08-12-23 @ 11:25)  MRSA PCR Result.: Negative (08-12-23 @ 06:10)  Rapid RVP Result: NotDetec (08-12-23 @ 01:33)  Procalcitonin, Serum: 0.63 (08-10-23 @ 08:46)  Procalcitonin, Serum: 7.82 (08-04-23 @ 16:13)  MRSA PCR Result.: Negative (08-04-23 @ 14:52)  Rapid RVP Result: NotDetec (08-04-23 @ 03:00)  strept    INFLAMMATORY MARKERS  Sedimentation Rate, Erythrocyte: 100 mm/Hr (02-03-24 @ 11:13)  C-Reactive Protein, Serum: 214.2 mg/L (02-03-24 @ 11:13)  C-Reactive Protein, Serum: 132.3 mg/L (02-01-24 @ 16:00)  Sedimentation Rate, Erythrocyte: 67 mm/Hr (10-15-23 @ 07:28)      RADIOLOGY & ADDITIONAL TESTS:  I have personally reviewed the last available Chest xray  CXR  Xray Chest 1 View- PORTABLE-Urgent:   ACC: [id number] EXAM:  XR CHEST PORTABLE URGENT 1V   ORDERED BY: SEVERINO PETERS     PROCEDURE DATE:  02/06/2024          INTERPRETATION:  Clinical History / Reason for exam: Enteric tube   placement    Comparison : Chest radiograph February 6, 2024.    Technique/Positioning: Single frontal chest x-ray obtained.    Findings:    Support devices: Enteric tube extends below left hemidiaphragm    Cardiac/mediastinum/hilum: Unchanged.    Lung parenchyma/Pleura: Unchanged bilateral opacities.    Skeleton/soft tissues: Unchanged.    Impression:    Unchanged bilateral opacities.  Enteric tube extends below left hemidiaphragm.    --- End of Report ---            SHYANN GREENE MD; Attending Radiologist  This document has been electronically signed. Feb 62024  2:33PM (02-06-24 @ 13:23)      CT      CARDIOLOGY TESTING  12 Lead ECG:   Ventricular Rate 118 BPM    Atrial Rate 118 BPM    P-R Interval 122 ms    QRS Duration 64 ms    Q-T Interval 328 ms    QTC Calculation(Bazett) 459 ms    P Axis 54 degrees    R Axis 93 degrees    T Axis 58 degrees    Diagnosis Line Sinus tachycardia  Rightward axis  Low voltage QRS  Borderline ECG    Confirmed by MARJAN MENDES MD ([id number]) on 2/2/2024 7:15:17 AM (02-01-24 @ 14:30)  12 Lead ECG:   Ventricular Rate 88 BPM    Atrial Rate 88 BPM    P-R Interval 112 ms    QRS Duration 64 ms    Q-T Interval 362 ms    QTC Calculation(Bazett) 438 ms    P Axis -11 degrees    R Axis 95 degrees    T Axis 59 degrees    Diagnosis Line Normal sinus rhythm  Rightward axis  Borderline ECG    Confirmed by caleb roberts ([id number]) on 1/31/2024 2:01:26 PM (01-31-24 @ 11:11)      MEDICATIONS  albuterol    90 MICROgram(s) HFA Inhaler 2 Inhalation two times a day  albuterol/ipratropium for Nebulization 3 Nebulizer every 6 hours  artificial  tears Solution 1 Both EYES two times a day  calcium carbonate   1250 mG (OsCal) 1 Oral two times a day  caspofungin IVPB 50 IV Intermittent every 24 hours  caspofungin IVPB     chlorhexidine 2% Cloths 1 Topical daily  enoxaparin Injectable 50 SubCutaneous every 12 hours  gabapentin 300 Oral every 12 hours  levETIRAcetam  Solution 500 Oral every 12 hours  melatonin 3 Oral at bedtime  meropenem  IVPB 1000 IV Intermittent every 8 hours  metoprolol tartrate 12.5 Oral two times a day  midodrine. 20 Oral three times a day  norepinephrine Infusion 0.05 IV Continuous <Continuous>  pantoprazole  Injectable 40 IV Push every 24 hours  polyethylene glycol 3350 17 Oral at bedtime  potassium chloride   Powder 40 Oral once  potassium chloride  20 mEq/100 mL IVPB 20 IV Intermittent every 2 hours  raloxifene 60 Oral daily  scopolamine 1 mG/72 Hr(s) Patch 1 Transdermal every 72 hours  senna 2 Oral at bedtime  silver sulfADIAZINE 1% Cream 1 Topical two times a day      WEIGHT  Weight (kg): 52.3 (01-21-24 @ 08:00)  Creatinine: 0.5 mg/dL (02-07-24 @ 05:36)      ANTIBIOTICS:  caspofungin IVPB 50 milliGRAM(s) IV Intermittent every 24 hours  caspofungin IVPB      meropenem  IVPB 1000 milliGRAM(s) IV Intermittent every 8 hours      All available historical records have been reviewed       TEA ECHAVARRIA  58y, Female  Allergy: No Known Allergies      LOS  18d    CHIEF COMPLAINT: Respiratory Distress (07 Feb 2024 08:58)      INTERVAL EVENTS/HPI  - low grade fever, WBC downtrending   - T(F): , Max: 100.7 (02-06-24 @ 16:00)  - Tolerating medication  - WBC Count: 9.76 (02-07-24 @ 05:36)  WBC Count: 16.32 (02-06-24 @ 04:43)     - Creatinine: 0.5 (02-07-24 @ 05:36)  Creatinine: 0.5 (02-06-24 @ 04:43)       ROS  unable to obtain history secondary to patient's mental status and/or sedation     VITALS:  T(F): 100.3, Max: 100.7 (02-06-24 @ 16:00)  HR: 54  BP: 109/52  RR: 18Vital Signs Last 24 Hrs  T(C): 37.9 (07 Feb 2024 04:00), Max: 38.2 (06 Feb 2024 16:00)  T(F): 100.3 (07 Feb 2024 04:00), Max: 100.7 (06 Feb 2024 16:00)  HR: 54 (07 Feb 2024 09:00) (54 - 95)  BP: 109/52 (07 Feb 2024 09:00) (79/43 - 109/52)  BP(mean): 75 (07 Feb 2024 09:00) (55 - 75)  RR: 18 (07 Feb 2024 04:00) (18 - 20)  SpO2: 99% (07 Feb 2024 09:00) (92% - 99%)    Parameters below as of 07 Feb 2024 08:19  Patient On (Oxygen Delivery Method): nasal cannula, high flow  O2 Flow (L/min): 60  O2 Concentration (%): 80    PHYSICAL EXAM:  Gen: chronically ill appearing  HFNC  HEENT: Normocephalic, atraumatic  Neck: supple, no lymphadenopathy  CV: Regular rate & regular rhythm  Lungs: decreased BS at bases, no fremitus  Abdomen: Soft, BS present  Ext: Warm, well perfused  Neuro: non focal, not following commands  Skin: no rash, no erythema  Lines: no phlebitis     FH: Non-contributory  Social Hx: Non-contributory    TESTS & MEASUREMENTS:                        8.3    9.76  )-----------( 332      ( 07 Feb 2024 05:36 )             27.5     02-07    146  |  105  |  15  ----------------------------<  136<H>  2.9<L>   |  32  |  0.5<L>    Ca    8.2<L>      07 Feb 2024 05:36  Mg     2.1     02-07    TPro  5.3<L>  /  Alb  2.4<L>  /  TBili  <0.2  /  DBili  x   /  AST  18  /  ALT  15  /  AlkPhos  80  02-07      LIVER FUNCTIONS - ( 07 Feb 2024 05:36 )  Alb: 2.4 g/dL / Pro: 5.3 g/dL / ALK PHOS: 80 U/L / ALT: 15 U/L / AST: 18 U/L / GGT: x           Urinalysis Basic - ( 07 Feb 2024 05:36 )    Color: x / Appearance: x / SG: x / pH: x  Gluc: 136 mg/dL / Ketone: x  / Bili: x / Urobili: x   Blood: x / Protein: x / Nitrite: x   Leuk Esterase: x / RBC: x / WBC x   Sq Epi: x / Non Sq Epi: x / Bacteria: x        Culture - Blood (collected 02-03-24 @ 11:13)  Source: .Blood None  Preliminary Report (02-06-24 @ 20:01):    No growth at 72 Hours    Culture - Blood (collected 02-01-24 @ 11:58)  Source: .Blood None  Final Report (02-06-24 @ 23:06):    No growth at 5 days    Culture - Urine (collected 02-01-24 @ 11:40)  Source: Clean Catch Clean Catch (Midstream)  Final Report (02-03-24 @ 00:06):    >=3 organisms. Probable collection contamination.    Culture - Blood (collected 01-31-24 @ 18:21)  Source: .Blood None  Final Report (02-06-24 @ 01:01):    No growth at 5 days    Culture - Blood (collected 01-31-24 @ 18:21)  Source: .Blood None  Final Report (02-06-24 @ 01:01):    No growth at 5 days    Culture - Urine (collected 01-23-24 @ 05:20)  Source: Clean Catch Clean Catch (Midstream)  Final Report (01-25-24 @ 00:26):    No growth    Culture - Blood (collected 01-22-24 @ 16:51)  Source: .Blood None  Final Report (01-28-24 @ 01:00):    No growth at 5 days    Culture - Urine (collected 01-21-24 @ 05:00)  Source: Clean Catch Clean Catch (Midstream)  Final Report (01-22-24 @ 07:15):    No growth    Culture - Blood (collected 01-20-24 @ 16:15)  Source: .Blood Blood-Peripheral  Gram Stain (01-21-24 @ 20:33):    Growth in aerobic bottle: Gram positive cocci in pairs  Final Report (01-22-24 @ 19:04):    Growth in aerobic bottle: Staphylococcus hominis    Isolation of Coagulase negative Staphylococcus from single blood culture    sets may represent    contamination. Contact the Microbiology Department at [phone] if    susceptibility testing is    clinically indicated.    Direct identification is available within approximately 3-5    hours either by Blood Panel Multiplexed PCR or Direct    MALDI-TOF. Details: [email or web address]  Organism: Blood Culture PCR (01-22-24 @ 19:04)  Organism: Blood Culture PCR (01-22-24 @ 19:04)      Method Type: PCR      -  Coagulase negative Staphylococcus: Detec    Culture - Blood (collected 01-20-24 @ 16:15)  Source: .Blood Blood-Peripheral  Final Report (01-25-24 @ 23:00):    No growth at 5 days        Lactate, Blood: <0.2 mmol/L (02-03-24 @ 06:25)      INFECTIOUS DISEASES TESTING  Rapid RVP Result: NotDetec (02-04-24 @ 10:28)  MRSA PCR Result.: Negative (02-03-24 @ 21:32)  Procalcitonin, Serum: 0.15 (02-03-24 @ 11:13)  Procalcitonin, Serum: 0.22 (02-01-24 @ 16:00)  MRSA PCR Result.: Negative (01-22-24 @ 05:30)  Streptococcus pneumoniae Ag, Ur Result: Negative (01-21-24 @ 05:00)  Legionella Antigen, Urine: Negative (01-21-24 @ 05:00)  Rapid RVP Result: Detected (01-21-24 @ 00:58)  Procalcitonin, Serum: 0.51 (01-20-24 @ 21:23)  Fungitell: 325 (01-20-24 @ 21:23)  Vancomycin Level, Trough: 5.9 (11-12-23 @ 17:55)  Fungitell: 138 (11-07-23 @ 08:08)  Fungitell: 115 (11-02-23 @ 11:40)  Procalcitonin, Serum: 0.04 (11-02-23 @ 11:40)  Procalcitonin, Serum: 0.26 (10-24-23 @ 11:00)  MRSA PCR Result.: Negative (10-17-23 @ 17:20)  Fungitell: >500 (10-17-23 @ 17:20)  Procalcitonin, Serum: 4.36 (10-17-23 @ 17:20)  Procalcitonin, Serum: 4.18 (10-17-23 @ 12:35)  Procalcitonin, Serum: 0.07 (10-15-23 @ 07:28)  COVID-19 PCR: NotDetec (10-12-23 @ 09:14)  Procalcitonin, Serum: 0.40 (10-07-23 @ 10:54)  Streptococcus pneumoniae Ag, Ur Result: Negative (09-30-23 @ 14:36)  Legionella Antigen, Urine: Negative (09-30-23 @ 14:36)  MRSA PCR Result.: Negative (09-29-23 @ 16:50)  Procalcitonin, Serum: 9.78 (08-12-23 @ 11:25)  MRSA PCR Result.: Negative (08-12-23 @ 06:10)  Rapid RVP Result: NotDetec (08-12-23 @ 01:33)  Procalcitonin, Serum: 0.63 (08-10-23 @ 08:46)  Procalcitonin, Serum: 7.82 (08-04-23 @ 16:13)  MRSA PCR Result.: Negative (08-04-23 @ 14:52)  Rapid RVP Result: NotDetec (08-04-23 @ 03:00)  strept    INFLAMMATORY MARKERS  Sedimentation Rate, Erythrocyte: 100 mm/Hr (02-03-24 @ 11:13)  C-Reactive Protein, Serum: 214.2 mg/L (02-03-24 @ 11:13)  C-Reactive Protein, Serum: 132.3 mg/L (02-01-24 @ 16:00)  Sedimentation Rate, Erythrocyte: 67 mm/Hr (10-15-23 @ 07:28)      RADIOLOGY & ADDITIONAL TESTS:  I have personally reviewed the last available Chest xray  CXR  Xray Chest 1 View- PORTABLE-Urgent:   ACC: [id number] EXAM:  XR CHEST PORTABLE URGENT 1V   ORDERED BY: SEVERINO PETERS     PROCEDURE DATE:  02/06/2024          INTERPRETATION:  Clinical History / Reason for exam: Enteric tube   placement    Comparison : Chest radiograph February 6, 2024.    Technique/Positioning: Single frontal chest x-ray obtained.    Findings:    Support devices: Enteric tube extends below left hemidiaphragm    Cardiac/mediastinum/hilum: Unchanged.    Lung parenchyma/Pleura: Unchanged bilateral opacities.    Skeleton/soft tissues: Unchanged.    Impression:    Unchanged bilateral opacities.  Enteric tube extends below left hemidiaphragm.    --- End of Report ---            SHYANN GREENE MD; Attending Radiologist  This document has been electronically signed. Feb 62024  2:33PM (02-06-24 @ 13:23)      CT      CARDIOLOGY TESTING  12 Lead ECG:   Ventricular Rate 118 BPM    Atrial Rate 118 BPM    P-R Interval 122 ms    QRS Duration 64 ms    Q-T Interval 328 ms    QTC Calculation(Bazett) 459 ms    P Axis 54 degrees    R Axis 93 degrees    T Axis 58 degrees    Diagnosis Line Sinus tachycardia  Rightward axis  Low voltage QRS  Borderline ECG    Confirmed by MARJAN MENDES MD ([id number]) on 2/2/2024 7:15:17 AM (02-01-24 @ 14:30)  12 Lead ECG:   Ventricular Rate 88 BPM    Atrial Rate 88 BPM    P-R Interval 112 ms    QRS Duration 64 ms    Q-T Interval 362 ms    QTC Calculation(Bazett) 438 ms    P Axis -11 degrees    R Axis 95 degrees    T Axis 59 degrees    Diagnosis Line Normal sinus rhythm  Rightward axis  Borderline ECG    Confirmed by caleb roberts ([id number]) on 1/31/2024 2:01:26 PM (01-31-24 @ 11:11)      MEDICATIONS  albuterol    90 MICROgram(s) HFA Inhaler 2 Inhalation two times a day  albuterol/ipratropium for Nebulization 3 Nebulizer every 6 hours  artificial  tears Solution 1 Both EYES two times a day  calcium carbonate   1250 mG (OsCal) 1 Oral two times a day  caspofungin IVPB 50 IV Intermittent every 24 hours  caspofungin IVPB     chlorhexidine 2% Cloths 1 Topical daily  enoxaparin Injectable 50 SubCutaneous every 12 hours  gabapentin 300 Oral every 12 hours  levETIRAcetam  Solution 500 Oral every 12 hours  melatonin 3 Oral at bedtime  meropenem  IVPB 1000 IV Intermittent every 8 hours  metoprolol tartrate 12.5 Oral two times a day  midodrine. 20 Oral three times a day  norepinephrine Infusion 0.05 IV Continuous <Continuous>  pantoprazole  Injectable 40 IV Push every 24 hours  polyethylene glycol 3350 17 Oral at bedtime  potassium chloride   Powder 40 Oral once  potassium chloride  20 mEq/100 mL IVPB 20 IV Intermittent every 2 hours  raloxifene 60 Oral daily  scopolamine 1 mG/72 Hr(s) Patch 1 Transdermal every 72 hours  senna 2 Oral at bedtime  silver sulfADIAZINE 1% Cream 1 Topical two times a day      WEIGHT  Weight (kg): 52.3 (01-21-24 @ 08:00)  Creatinine: 0.5 mg/dL (02-07-24 @ 05:36)      ANTIBIOTICS:  caspofungin IVPB 50 milliGRAM(s) IV Intermittent every 24 hours  caspofungin IVPB      meropenem  IVPB 1000 milliGRAM(s) IV Intermittent every 8 hours      All available historical records have been reviewed

## 2024-02-07 NOTE — PROGRESS NOTE ADULT - ATTENDING COMMENTS
IMPRESSION:    Acute hypoxemic respiratory failure  Likely aspiration pneumonia   Sepsis POA  Septic shock  on Levophed   Recurrent aspiration pneumonia/ prior intubation  HO GI bleed  HO OM  HO recent duodenal perforation   HO polymicrobial bacteremia   H/o CP, DS  H/o seizures      Plan as outlined above

## 2024-02-07 NOTE — PROGRESS NOTE ADULT - ASSESSMENT
58F w/ PMHx Down Syndrome, nonverbal at baseline, Hypothyroidism, Cerebral palsy and Seizure Disorders presents to the ED from nursing facility/group home ([facility]) presented to ED for respiratory distress and high grade fever. Patient found to be septic on admission.Admitted to SDU for management of acute respiratory distress 2/2 CAP/Aspiration PNA (20 Jan 2024 18:22)  While pt was on the floor she developed dyspnea after swallow evaluation, was spiking high grade fever, consulted by pulmonary/critical care and was upgraded back to SDU.    Sepsis POA / Acute hypoxic resp failure / RSV bronchiolitis /  H/o Dysphagia/ suspected aspiration  - inflammatory markers elevated, RVP is negative    - MRSA negative   - BCx (1/20): Staph hominis -  contaminant repeat BCx have been NGTD  - Failed FEES, s/p  NG tube for feedings and medications, patient will need PEG tube once improved. Recall GI once off HFNC   - Aspiration precautions, Chest PT vest , c/w duoneb  - ID is following, recommendations noted:   - c/w  Caspo 70mg x1 and 50mg q24h IV , repeat fungitell pending. No clear source  - Continue meropenem 1g q8h IV  - check  histoplasma Ab serum   - On HFNC; alternate with BIPAP. Keep spo2 more than 92%.    Hypernatremia - resolved after IV hydration, cw tube feeds      H/o hypotension  -  Midodrine dose increased to 20 mg Q 8 hours    - keep MAP above 60     Elevated Troponin , type 2 MI/  Sinus tachycardia  - c/w telemetry monitoring   - Repeat EKG: Normal sinus rhythm  - c/w  metoprolol    Seizure Disorder  - c/w  Keppra   - seizure precautions  - keep Mg above 2.0     H/o lower ext DVT  - cw therapeutic Lovenox, holding Eliquis     Normocytic Anemia - stable    Down Syndrome/  Cerebral Palsy  - supportive care  - prevent falls and aspiration   - failed speech and swallow, needs PEG as above  - on Raloxifene     Full code, overall prognosis is very poor, continue monitoring in SDU       Patient seen at bedside, total time spent to evaluate and treat the patient's acute illness and chronic medical conditions as well as time spent reviewing labs, radiology, discussing medical plan with covering medical team was more than 55 minutes with >50% of time spent face to face with patient, discussing with patient/family as well as coordination of care

## 2024-02-07 NOTE — PROGRESS NOTE ADULT - ASSESSMENT
IMPRESSION:    Acute hypoxemic respiratory failure  Likely aspiration pneumonia   Sepsis POA  Septic shock  on Levophed   Recurrent aspiration pneumonia/ prior intubation  HO GI bleed  HO OM  HO recent duodenal perforation   HO polymicrobial bacteremia   H/o CP, DS  H/o seizures    PLAN:    CNS:  Avoid CNS Depressant, AED. MS at baseline.     HEENT: Oral care.     PULMONARY: HOB at 45 degrees.  Aspiration precaution. Wean FiO2 Keep spo2 more than 92%.  Aggressive pulm toilet, frequent suctioning.  Might need MV, Bedside US later today, ABG this morning noted    CARDIOVASCULAR: Keep MAP more than 60 mmhg. Avoid overload. Wean levophed as able. C/w midodrine 20mg.  Goal directed fluid resuscitation, give 500c bolus now, reassess IVC after    GI: Protonix. NG  feeding.  Speech and swallow recs noted.  F/u GI for PEG placement    INFECTIOUS DISEASE:    c/w Meropenem and Caspo per ID.  FU Fungitel     HEMATOLOGICAL: change to Lovenox therapeutic.  Monitor CBC     ENDOCRINE:  Follow up FS.  Insulin protocol if needed.    MUSCULOSKELETAL: Bedrest.  Off loading.  Wound care.      Prognosis very poor.    Goals of care.  Palliative care evaluation     SDU.   IMPRESSION:    Acute hypoxemic respiratory failure  Likely aspiration pneumonia   Sepsis POA  Septic shock  on Levophed   Recurrent aspiration pneumonia/ prior intubation  HO GI bleed  HO OM  HO recent duodenal perforation   HO polymicrobial bacteremia   H/o CP, DS  H/o seizures    PLAN:    CNS:  Avoid CNS Depressant, AED. MS at baseline.     HEENT: Oral care.     PULMONARY: HOB at 45 degrees.  Aspiration precaution. Wean FiO2 Keep spo2 more than 92%.  Aggressive pulm toilet, frequent suctioning.  Might need MV,  ABG noted    CARDIOVASCULAR: Keep MAP more than 60. Avoid overload. Wean levophed as able.  C/w midodrine 20mg.  Goal directed fluid resuscitation.      GI: Protonix. NG  feeding.  Speech and swallow recs noted. Might need PEG when more stable    INFECTIOUS DISEASE:    c/w Meropenem and Caspo per ID.  FU Fungitel     HEMATOLOGICAL: change to Lovenox therapeutic.  Monitor CBC     ENDOCRINE:  Follow up FS.  Insulin protocol if needed.    MUSCULOSKELETAL: Bedrest.  Off loading.  Wound care.      Prognosis very poor.    Goals of care.  Palliative care evaluation     SDU.

## 2024-02-07 NOTE — PROGRESS NOTE ADULT - ASSESSMENT
ASSESSMENT  58F w/ PMHx Down Syndrome, nonverbal at baseline, Hypothyroidism, Cerebral palsy and Seizure Disorders presents to the ED from nursing facility/group home presented to ED for respiratory distress and high grade fever.     IMPRESSION  #Fever  Unclear source , fever curve and WBC downtrending with caspofungin, unclear source    2/3 BCX NGTD     2/1 BCX NGTD     2/1 UCX   >=3 organisms. Probable collection contamination.    1/31 BCX NG    Rapid RVP Result: NotDetec (02-04-24 @ 10:28)    MRSA PCR Result.: Negative (02-03-24 @ 21:32)     UA without significant pyuria   Procalcitonin, Serum: 0.22 (02-01-24 @ 16:00)--> Procalcitonin, Serum: 0.15 (02-03-24 @ 11:13), downtrending ; unremarkable   MRSA PCR Result.: Negative (01-22-24 @ 05:30)  < from: Xray Chest 1 View-PORTABLE IMMEDIATE (Xray Chest 1 View-PORTABLE IMMEDIATE .) (02.01.24 @ 03:02) >  Bibasal opacities without significant change.   #Acute hypoxic respiratory failure- Gram Negative pneumonia   #1/20 BCX 1/4 bottles : Staphylococcus hominis- contaminant   Repeat CX NG   #Severe Sepsis on admission  #RSV + 1/21  #Elevated fungitell   Fungitell: 325 (01-20-24 @ 21:23)  Fungitell: 138 (11-07-23 @ 08:08)  Fungitell: 115 (11-02-23 @ 11:40)  Fungitell: >500 pg/mL (10.17.23 @ 17:20) s/p empiric caspo   #Full thickness ulcer right heel- Appreciate burn/podiatry evaluation.  #History of Right planter foot ulcers - two full thickness ulcers - serous drainage with mild erythema with OM  - MR Foot No Cont, Right (10.16.23 @ 21:51): IMPRESSION: 1.  Limited exam. 2.  Osteomyelitis of the first metatarsal stump. 3.  Osteomyelitis of the second toe distal phalanx.  - s/p excidional debridement to and including bone 1st metatarsal with partial 2nd digit amputation - 1st metatarsal head resected - Wound Cx Proteus ESBL   #CKD 2-3 Creatinine: 0.7 mg/dL (02.02.24 @ 04:59)      #History of buttock ulcer  #Down syndrome/Cerebral Palsy     RECOMMENDATIONS  - Continue caspofungin 50mg q24h IV , hx unclear elevated fungitell. No clear source, will likely need empiric 14 day course. No risk factors for PCP or other invasive fungal infection  - Continue meropenem 1g q8h IV end 2/8, essential 14 day course (1/21-1/27, 2/1-present)  - SEND serum aspergillus galactomannan   - f/u fungitell , histoplasma Ab serum   - CT Chest, AP when stable     If any questions, please send a message or call on [employer] Teams  Please continue to update ID with any pertinent new laboratory or radiographic findings.

## 2024-02-08 LAB
1,3 BETA GLUCAN SER QL: ABNORMAL
ALBUMIN SERPL ELPH-MCNC: 2.4 G/DL — LOW (ref 3.5–5.2)
ALP SERPL-CCNC: 74 U/L — SIGNIFICANT CHANGE UP (ref 30–115)
ALT FLD-CCNC: 22 U/L — SIGNIFICANT CHANGE UP (ref 0–41)
ANION GAP SERPL CALC-SCNC: 11 MMOL/L — SIGNIFICANT CHANGE UP (ref 7–14)
AST SERPL-CCNC: 25 U/L — SIGNIFICANT CHANGE UP (ref 0–41)
BASOPHILS # BLD AUTO: 0.04 K/UL — SIGNIFICANT CHANGE UP (ref 0–0.2)
BASOPHILS NFR BLD AUTO: 0.5 % — SIGNIFICANT CHANGE UP (ref 0–1)
BILIRUB SERPL-MCNC: <0.2 MG/DL — SIGNIFICANT CHANGE UP (ref 0.2–1.2)
BUN SERPL-MCNC: 11 MG/DL — SIGNIFICANT CHANGE UP (ref 10–20)
CALCIUM SERPL-MCNC: 7.9 MG/DL — LOW (ref 8.4–10.4)
CHLORIDE SERPL-SCNC: 102 MMOL/L — SIGNIFICANT CHANGE UP (ref 98–110)
CO2 SERPL-SCNC: 29 MMOL/L — SIGNIFICANT CHANGE UP (ref 17–32)
CREAT SERPL-MCNC: <0.5 MG/DL — LOW (ref 0.7–1.5)
CULTURE RESULTS: SIGNIFICANT CHANGE UP
EGFR: 115 ML/MIN/1.73M2 — SIGNIFICANT CHANGE UP
EOSINOPHIL # BLD AUTO: 0.36 K/UL — SIGNIFICANT CHANGE UP (ref 0–0.7)
EOSINOPHIL NFR BLD AUTO: 4.2 % — SIGNIFICANT CHANGE UP (ref 0–8)
FUNGITELL: 63 PG/ML
GLUCOSE SERPL-MCNC: 129 MG/DL — HIGH (ref 70–99)
HCT VFR BLD CALC: 28.2 % — LOW (ref 37–47)
HGB BLD-MCNC: 8.4 G/DL — LOW (ref 12–16)
HISTONE AB SER-ACNC: 2.2 UNITS — HIGH (ref 0–0.9)
IMM GRANULOCYTES NFR BLD AUTO: 2.2 % — HIGH (ref 0.1–0.3)
LYMPHOCYTES # BLD AUTO: 1.18 K/UL — LOW (ref 1.2–3.4)
LYMPHOCYTES # BLD AUTO: 13.8 % — LOW (ref 20.5–51.1)
MAGNESIUM SERPL-MCNC: 2 MG/DL — SIGNIFICANT CHANGE UP (ref 1.8–2.4)
MCHC RBC-ENTMCNC: 23.7 PG — LOW (ref 27–31)
MCHC RBC-ENTMCNC: 29.8 G/DL — LOW (ref 32–37)
MCV RBC AUTO: 79.7 FL — LOW (ref 81–99)
MONOCYTES # BLD AUTO: 0.47 K/UL — SIGNIFICANT CHANGE UP (ref 0.1–0.6)
MONOCYTES NFR BLD AUTO: 5.5 % — SIGNIFICANT CHANGE UP (ref 1.7–9.3)
NEUTROPHILS # BLD AUTO: 6.29 K/UL — SIGNIFICANT CHANGE UP (ref 1.4–6.5)
NEUTROPHILS NFR BLD AUTO: 73.8 % — SIGNIFICANT CHANGE UP (ref 42.2–75.2)
NRBC # BLD: 0 /100 WBCS — SIGNIFICANT CHANGE UP (ref 0–0)
PLATELET # BLD AUTO: 261 K/UL — SIGNIFICANT CHANGE UP (ref 130–400)
PMV BLD: 11.6 FL — HIGH (ref 7.4–10.4)
POTASSIUM SERPL-MCNC: 4.4 MMOL/L — SIGNIFICANT CHANGE UP (ref 3.5–5)
POTASSIUM SERPL-SCNC: 4.4 MMOL/L — SIGNIFICANT CHANGE UP (ref 3.5–5)
PROT SERPL-MCNC: 5.6 G/DL — LOW (ref 6–8)
RBC # BLD: 3.54 M/UL — LOW (ref 4.2–5.4)
RBC # FLD: 20 % — HIGH (ref 11.5–14.5)
SODIUM SERPL-SCNC: 142 MMOL/L — SIGNIFICANT CHANGE UP (ref 135–146)
SPECIMEN SOURCE: SIGNIFICANT CHANGE UP
WBC # BLD: 8.53 K/UL — SIGNIFICANT CHANGE UP (ref 4.8–10.8)
WBC # FLD AUTO: 8.53 K/UL — SIGNIFICANT CHANGE UP (ref 4.8–10.8)

## 2024-02-08 PROCEDURE — 71045 X-RAY EXAM CHEST 1 VIEW: CPT | Mod: 26

## 2024-02-08 PROCEDURE — 99233 SBSQ HOSP IP/OBS HIGH 50: CPT

## 2024-02-08 PROCEDURE — 99291 CRITICAL CARE FIRST HOUR: CPT

## 2024-02-08 RX ORDER — MEROPENEM 1 G/30ML
1000 INJECTION INTRAVENOUS EVERY 8 HOURS
Refills: 0 | Status: DISCONTINUED | OUTPATIENT
Start: 2024-02-09 | End: 2024-02-15

## 2024-02-08 RX ADMIN — Medication 1 DROP(S): at 05:18

## 2024-02-08 RX ADMIN — Medication 1 APPLICATION(S): at 18:26

## 2024-02-08 RX ADMIN — CASPOFUNGIN ACETATE 260 MILLIGRAM(S): 7 INJECTION, POWDER, LYOPHILIZED, FOR SOLUTION INTRAVENOUS at 12:37

## 2024-02-08 RX ADMIN — Medication 1 TABLET(S): at 18:25

## 2024-02-08 RX ADMIN — Medication 3 MILLIGRAM(S): at 21:24

## 2024-02-08 RX ADMIN — RALOXIFENE HYDROCHLORIDE 60 MILLIGRAM(S): 60 TABLET, COATED ORAL at 12:05

## 2024-02-08 RX ADMIN — Medication 3 MILLILITER(S): at 07:58

## 2024-02-08 RX ADMIN — MIDODRINE HYDROCHLORIDE 20 MILLIGRAM(S): 2.5 TABLET ORAL at 05:18

## 2024-02-08 RX ADMIN — Medication 1 TABLET(S): at 05:19

## 2024-02-08 RX ADMIN — MIDODRINE HYDROCHLORIDE 20 MILLIGRAM(S): 2.5 TABLET ORAL at 12:01

## 2024-02-08 RX ADMIN — MEROPENEM 100 MILLIGRAM(S): 1 INJECTION INTRAVENOUS at 05:17

## 2024-02-08 RX ADMIN — ENOXAPARIN SODIUM 50 MILLIGRAM(S): 100 INJECTION SUBCUTANEOUS at 05:17

## 2024-02-08 RX ADMIN — Medication 1 APPLICATION(S): at 05:22

## 2024-02-08 RX ADMIN — ENOXAPARIN SODIUM 50 MILLIGRAM(S): 100 INJECTION SUBCUTANEOUS at 18:25

## 2024-02-08 RX ADMIN — GABAPENTIN 300 MILLIGRAM(S): 400 CAPSULE ORAL at 05:20

## 2024-02-08 RX ADMIN — Medication 3 MILLILITER(S): at 19:59

## 2024-02-08 RX ADMIN — Medication 12.5 MILLIGRAM(S): at 18:24

## 2024-02-08 RX ADMIN — CHLORHEXIDINE GLUCONATE 1 APPLICATION(S): 213 SOLUTION TOPICAL at 12:39

## 2024-02-08 RX ADMIN — Medication 1 DROP(S): at 18:26

## 2024-02-08 RX ADMIN — PANTOPRAZOLE SODIUM 40 MILLIGRAM(S): 20 TABLET, DELAYED RELEASE ORAL at 05:21

## 2024-02-08 RX ADMIN — GABAPENTIN 300 MILLIGRAM(S): 400 CAPSULE ORAL at 18:25

## 2024-02-08 RX ADMIN — LEVETIRACETAM 500 MILLIGRAM(S): 250 TABLET, FILM COATED ORAL at 05:19

## 2024-02-08 RX ADMIN — Medication 3 MILLILITER(S): at 13:04

## 2024-02-08 RX ADMIN — LEVETIRACETAM 500 MILLIGRAM(S): 250 TABLET, FILM COATED ORAL at 18:25

## 2024-02-08 NOTE — CHART NOTE - NSCHARTNOTEFT_GEN_A_CORE
Paper work to get the consent for PEG tube was initiated as per the request of the primary team   recall once the consent is obtained

## 2024-02-08 NOTE — PROGRESS NOTE ADULT - ASSESSMENT
IMPRESSION:    Acute hypoxemic respiratory failure  Likely aspiration pneumonia   Sepsis POA  Septic shock  on Levophed   Recurrent aspiration pneumonia/ prior intubation  HO GI bleed  HO OM  HO recent duodenal perforation   HO polymicrobial bacteremia   H/o CP, DS  H/o seizures    PLAN:    CNS:  Avoid CNS Depressant, AED. MS at baseline.     HEENT: Oral care.     PULMONARY: HOB at 45 degrees.  Aspiration precaution. Wean FiO2 Keep spo2 more than 92%.  Aggressive pulm toilet, frequent suctioning.  Might need MV,  ABG noted    CARDIOVASCULAR: Keep MAP more than 60. Avoid overload. Wean levophed as able.  C/w midodrine 20mg.  Goal directed fluid resuscitation.      GI: Protonix. NG  feeding.  Speech and swallow recs noted. Might need PEG when more stable    INFECTIOUS DISEASE:    c/w Meropenem and Caspo per ID.  FU Fungitel     HEMATOLOGICAL: change to Lovenox therapeutic.  Monitor CBC     ENDOCRINE:  Follow up FS.  Insulin protocol if needed.    MUSCULOSKELETAL: Bedrest.  Off loading.  Wound care.      Prognosis very poor.    Goals of care.  Palliative care evaluation     SDU.

## 2024-02-08 NOTE — PROGRESS NOTE ADULT - ASSESSMENT
58F w/ PMHx Down Syndrome, nonverbal at baseline, Hypothyroidism, Cerebral palsy and Seizure Disorders presents to the ED from nursing facility/group home ([facility]) presented to ED for respiratory distress and high grade fever. Patient found to be septic on admission.Admitted to SDU for management of acute respiratory distress 2/2 CAP/Aspiration PNA (20 Jan 2024 18:22)  While pt was on the floor she developed dyspnea after swallow evaluation, was spiking high grade fever, consulted by pulmonary/critical care and was upgraded back to SDU.    Sepsis POA / Acute hypoxic resp failure / RSV bronchiolitis /  H/o Dysphagia/ suspected aspiration  - inflammatory markers elevated, RVP is negative, MRSA neg  - BCx (1/20): Staph hominis -  contaminant repeat BCx have been NGTD  - Failed FEES, s/p  NG tube for feedings and medications, patient will need PEG tube once improved. Recall GI once off HFNC   - Aspiration precautions, Chest PT vest , c/w duoneb  - ID is following, recommendations noted:   - c/w  Caspo 50mg q24h IV , repeat fungitell pending. No clear source  - Continue meropenem 1g q8h IV  - check  histoplasma Ab serum   - On HFNC; alternate with BIPAP. Keep spo2 more than 92%.  - remains on levophed  and midodrine 20 Q8H  - IVF bolus today  to wean off pressors    Hypernatremia - worsening again today. Increase free water to 250 Q4H and IVF bolus as above      H/o hypotension  -  Midodrine dose increased to 20 mg Q 8 hours    - keep MAP above 60, taper off levophed    Elevated Troponin , type 2 MI/  Sinus tachycardia  - c/w telemetry monitoring   - Repeat EKG: Normal sinus rhythm  - c/w  metoprolol    Seizure Disorder  - c/w  Keppra   - seizure precautions  - keep Mg above 2.0     H/o lower ext DVT  - cw therapeutic Lovenox, holding Eliquis     Normocytic Anemia - stable    Down Syndrome/  Cerebral Palsy  - supportive care  - prevent falls and aspiration   - failed speech and swallow, needs PEG as above  - on Raloxifene     Full code, overall prognosis is very poor, continue monitoring in SDU     Patient seen at bedside, total time spent to evaluate and treat the patient's acute illness and chronic medical conditions as well as time spent reviewing labs, radiology, discussing medical plan with covering medical team was more than 55 minutes with >50% of time spent face to face with patient, discussing with patient/family as well as coordination of care

## 2024-02-08 NOTE — CHART NOTE - NSCHARTNOTEFT_GEN_A_CORE
IVC assessed by medical team via US. Noted to be non collapsible and >2CM. Will hold off on fluids for now.

## 2024-02-08 NOTE — PROGRESS NOTE ADULT - SUBJECTIVE AND OBJECTIVE BOX
TEA ECHAVARRIA  58y Female    CHIEF COMPLAINT:    Patient is a 58y old  Female who presents with a chief complaint of Respiratory Distress (2024 07:52)    INTERVAL HPI/OVERNIGHT EVENTS:    Patient seen and examined. No acute events overnight. Overall unchanged, remains on HFNC + NRB, on levophed   ROS: All other systems are negative.    Vital Signs:    T(F): 100.5 (24 @ 04:00), Max: 100.5 (24 @ 04:00)  HR: 82 (24 @ 04:00) (54 - 85)  BP: 99/57 (24 @ 04:00) (83/49 - 118/71)  RR: 18 (24 @ :00) (18 - 20)  SpO2: 94% (24 @ 04:00) (93% - 100%)    2024 07:01  -  2024 07:00  --------------------------------------------------------  IN: 2651.7 mL / OUT: 500 mL / NET: 2151.7 mL    Daily Weight in k (2024 00:00)    PHYSICAL EXAM:    GENERAL:  NAD chronically ill appearing   SKIN: No rashes or lesions  HEENT: Atraumatic. Normocephalic.    NECK: Supple, No JVD.    PULMONARY: decreased breath sounds B/L. No wheezing.   CVS: Normal S1, S2. Rate and Rhythm are regular   ABDOMEN/GI: Soft, Nontender, mildly distended   MSK:  No clubbing or cyanosis   NEUROLOGIC: opens eyes to tactile stimuli, does not follow commands   PSYCH: lethargic     Consultant(s) Notes Reviewed:  [x ] YES  [ ] NO  Care Discussed with Consultants/Other Providers [ x] YES  [ ] NO    LABS:                        8.3    9.76  )-----------( 332      ( 2024 05:36 )             27.5     147<H>  |  109  |  13  ----------------------------<  119<H>  5.4<H>   |  31  |  0.5<L>    Ca    8.2<L>      2024 16:45  Mg     2.1         TPro  5.3<L>  /  Alb  2.4<L>  /  TBili  <0.2  /  DBili  x   /  AST  18  /  ALT  15  /  AlkPhos  80      RADIOLOGY & ADDITIONAL TESTS:  Imaging or report Personally Reviewed:  [x] YES  [ ] NO  EKG reviewed: [x] YES  [ ] NO    Medications:  Standing  albuterol/ipratropium for Nebulization 3 milliLiter(s) Nebulizer <User Schedule>  artificial  tears Solution 1 Drop(s) Both EYES two times a day  calcium carbonate   1250 mG (OsCal) 1 Tablet(s) Oral two times a day  caspofungin IVPB      caspofungin IVPB 50 milliGRAM(s) IV Intermittent every 24 hours  chlorhexidine 2% Cloths 1 Application(s) Topical daily  enoxaparin Injectable 50 milliGRAM(s) SubCutaneous every 12 hours  gabapentin 300 milliGRAM(s) Oral every 12 hours  levETIRAcetam  Solution 500 milliGRAM(s) Oral every 12 hours  melatonin 3 milliGRAM(s) Oral at bedtime  metoprolol tartrate 12.5 milliGRAM(s) Oral two times a day  midodrine. 20 milliGRAM(s) Oral three times a day  norepinephrine Infusion 0.05 MICROgram(s)/kG/Min IV Continuous <Continuous>  pantoprazole  Injectable 40 milliGRAM(s) IV Push every 24 hours  polyethylene glycol 3350 17 Gram(s) Oral at bedtime  raloxifene 60 milliGRAM(s) Oral daily  scopolamine 1 mG/72 Hr(s) Patch 1 Patch Transdermal every 72 hours  senna 2 Tablet(s) Oral at bedtime  silver sulfADIAZINE 1% Cream 1 Application(s) Topical two times a day    PRN Meds  acetaminophen     Tablet .. 650 milliGRAM(s) Oral every 6 hours PRN  acetaminophen     Tablet .. 650 milliGRAM(s) Oral once PRN  aluminum hydroxide/magnesium hydroxide/simethicone Suspension 30 milliLiter(s) Oral every 4 hours PRN  ondansetron Injectable 4 milliGRAM(s) IV Push every 8 hours PRN  sodium chloride 0.65% Nasal 1 Spray(s) Both Nostrils daily PRN

## 2024-02-08 NOTE — PROGRESS NOTE ADULT - SUBJECTIVE AND OBJECTIVE BOX
Interval History  Patient is a 58y old Female who presents with a chief complaint of Respiratory Distress (07 Feb 2024 12:57)    TEA ECHAVARRIA is admitted with a primary diagnosis of Sepsis with acute hypoxic respiratory failure      Today is hospital day 19d. The patient was examined at the bedside this morning. No acute overnight events were reported.    Admission HPI  HPI:  58F w/ PMHx Down Syndrome, nonverbal at baseline, Hypothyroidism, Cerebral palsy and Seizure Disorders presents to the ED from nursing facility/group home ([facility]) presented to ED for respiratory distress and high grade fever. Patient found to be septic on admission. As per aide Janette at bedside, patient had been coughing and congested for 3x days. Denies fevers, chills, n/v/d or pain prior to admission. Patient is on a puree diet at baseline.    Vitals: Temp 104.5F (rectal), /74, , RR 24, SpO2 100% on BiPAP    Labs: Hgb 9.5 (previously 11.1), Platelet 422, INR 1.43, VBG Lactate 2.1    Imaging: CXR shows possible RML infiltrate    In the ED:  - s/p Cefepime 2g IV x1  - s/p Levaquin 750mg IV x1  - s/p 2L LR bolus    Admitted to SDU for management of acute respiratory distress 2/2 CAP/Aspiration PNA (20 Jan 2024 18:22)      Past Medical and Surgical History  Down syndrome    Osteoporosis    Mild anemia    Neuropathy    S/P debridement  of R hip on 3/2/21      Family History  FAMILY HISTORY:    Social History  Social History:      Allergies  No Known Allergies    Medications  Standing Medications  albuterol/ipratropium for Nebulization 3 milliLiter(s) Nebulizer <User Schedule>  artificial  tears Solution 1 Drop(s) Both EYES two times a day  calcium carbonate   1250 mG (OsCal) 1 Tablet(s) Oral two times a day  caspofungin IVPB 50 milliGRAM(s) IV Intermittent every 24 hours  caspofungin IVPB      chlorhexidine 2% Cloths 1 Application(s) Topical daily  enoxaparin Injectable 50 milliGRAM(s) SubCutaneous every 12 hours  gabapentin 300 milliGRAM(s) Oral every 12 hours  levETIRAcetam  Solution 500 milliGRAM(s) Oral every 12 hours  melatonin 3 milliGRAM(s) Oral at bedtime  metoprolol tartrate 12.5 milliGRAM(s) Oral two times a day  midodrine. 20 milliGRAM(s) Oral three times a day  norepinephrine Infusion 0.05 MICROgram(s)/kG/Min IV Continuous <Continuous>  pantoprazole  Injectable 40 milliGRAM(s) IV Push every 24 hours  polyethylene glycol 3350 17 Gram(s) Oral at bedtime  raloxifene 60 milliGRAM(s) Oral daily  scopolamine 1 mG/72 Hr(s) Patch 1 Patch Transdermal every 72 hours  senna 2 Tablet(s) Oral at bedtime  silver sulfADIAZINE 1% Cream 1 Application(s) Topical two times a day    PRN Medications  acetaminophen     Tablet .. 650 milliGRAM(s) Oral once PRN  acetaminophen     Tablet .. 650 milliGRAM(s) Oral every 6 hours PRN  aluminum hydroxide/magnesium hydroxide/simethicone Suspension 30 milliLiter(s) Oral every 4 hours PRN  ondansetron Injectable 4 milliGRAM(s) IV Push every 8 hours PRN  sodium chloride 0.65% Nasal 1 Spray(s) Both Nostrils daily PRN    Vitals  T(F): 100.5  HR: 82  BP: 99/57  RR: 18  SpO2: 94%    I&O's    02-07-24 @ 07:01  -  02-08-24 @ 07:00  --------------------------------------------------------  IN: 2651.7 mL / OUT: 500 mL / NET: 2151.7 mL        Physical Examination  General: NAD, lying in bed comfortably  Head: NCAT  Neck: Supple, no JVD  Cardiac: Regular rate and rhythm. No murmurs, gallops, or rubs  Pulmonary: CTAB  Abdominal: Soft, nontender, and nondistended with positive bowel sounds  Extremities: No pitting edema  Neurologic: AOx3, nonfocal  Skin: No rashes or lesions    Labs                        8.3    9.76  )-----------( 332      ( 07 Feb 2024 05:36 )             27.5     02-07    147<H>  |  109  |  13  ----------------------------<  119<H>  5.4<H>   |  31  |  0.5<L>    Ca    8.2<L>      07 Feb 2024 16:45  Mg     2.1     02-07    TPro  5.3<L>  /  Alb  2.4<L>  /  TBili  <0.2  /  DBili  x   /  AST  18  /  ALT  15  /  AlkPhos  80  02-07      Urinalysis Basic - ( 07 Feb 2024 16:45 )    Color: x / Appearance: x / SG: x / pH: x  Gluc: 119 mg/dL / Ketone: x  / Bili: x / Urobili: x   Blood: x / Protein: x / Nitrite: x   Leuk Esterase: x / RBC: x / WBC x   Sq Epi: x / Non Sq Epi: x / Bacteria: x      CAPILLARY BLOOD GLUCOSE        ABG - ( 07 Feb 2024 08:02 )  pH, Arterial: 7.49  pH, Blood: x     /  pCO2: 47    /  pO2: 122   / HCO3: 36    / Base Excess: 10.9  /  SaO2: 99.4                Imaging  CXR  Xray Chest 1 View- PORTABLE-Urgent:   ACC: [id number] EXAM:  XR CHEST PORTABLE URGENT 1V   ORDERED BY: SEVERINO PETERS     PROCEDURE DATE:  02/06/2024          INTERPRETATION:  Clinical History / Reason for exam: Enteric tube   placement    Comparison : Chest radiograph February 6, 2024.    Technique/Positioning: Single frontal chest x-ray obtained.    Findings:    Support devices: Enteric tube extends below left hemidiaphragm    Cardiac/mediastinum/hilum: Unchanged.    Lung parenchyma/Pleura: Unchanged bilateral opacities.    Skeleton/soft tissues: Unchanged.    Impression:    Unchanged bilateral opacities.  Enteric tube extends below left hemidiaphragm.    --- End of Report ---            SHYANN GREENE MD; Attending Radiologist  This document has been electronically signed. Feb 62024  2:33PM (02-06-24 @ 13:23)  Xray Chest 1 View- PORTABLE-Urgent:   ACC: [id number] EXAM:  XR CHEST PORTABLE URGENT 1V   ORDERED BY: SEVERINO PETERS     PROCEDURE DATE:  02/05/2024          INTERPRETATION:  Clinical History / Reason for exam: Nasogastric tube.    Comparison : Chest radiograph dated February 5, 2024.    Technique/Positioning: Portable frontal.    Findings:    Support devices: Nasogastric tube in place. Overlying EKG leads.    Cardiac/mediastinum/hilum: Stable.    Lung parenchyma/Pleura: Decreased patchy bilateral opacities, left   greater than right. No pneumothorax.    Skeleton/soft tissues: Stable.    Impression:    1. Decreased patchy bilateral opacities, left greater than right.    --- End of Report ---            STEVIE IRAHETA MD; Attending Radiologist  This document has been electronicallysigned. Feb 5 2024  3:49PM (02-05-24 @ 11:50)      CT   Interval History  Patient is a 58y old Female who presents with a chief complaint of Respiratory Distress (07 Feb 2024 12:57)    TEA ECHAVARRIA is admitted with a primary diagnosis of Sepsis with acute hypoxic respiratory failure      Today is hospital day 19d. The patient was examined at the bedside this morning. No acute overnight events were reported.    Admission HPI  HPI:  58F w/ PMHx Down Syndrome, nonverbal at baseline, Hypothyroidism, Cerebral palsy and Seizure Disorders presents to the ED from nursing facility/group home ([facility]) presented to ED for respiratory distress and high grade fever. Patient found to be septic on admission. As per aide Janette at bedside, patient had been coughing and congested for 3x days. Denies fevers, chills, n/v/d or pain prior to admission. Patient is on a puree diet at baseline.    Vitals: Temp 104.5F (rectal), /74, , RR 24, SpO2 100% on BiPAP    Labs: Hgb 9.5 (previously 11.1), Platelet 422, INR 1.43, VBG Lactate 2.1    Imaging: CXR shows possible RML infiltrate    In the ED:  - s/p Cefepime 2g IV x1  - s/p Levaquin 750mg IV x1  - s/p 2L LR bolus    Admitted to SDU for management of acute respiratory distress 2/2 CAP/Aspiration PNA (20 Jan 2024 18:22)      Past Medical and Surgical History  Down syndrome    Osteoporosis    Mild anemia    Neuropathy    S/P debridement  of R hip on 3/2/21      Family History  FAMILY HISTORY:    Social History  Social History:      Allergies  No Known Allergies    Medications  Standing Medications  albuterol/ipratropium for Nebulization 3 milliLiter(s) Nebulizer <User Schedule>  artificial  tears Solution 1 Drop(s) Both EYES two times a day  calcium carbonate   1250 mG (OsCal) 1 Tablet(s) Oral two times a day  caspofungin IVPB 50 milliGRAM(s) IV Intermittent every 24 hours  caspofungin IVPB      chlorhexidine 2% Cloths 1 Application(s) Topical daily  enoxaparin Injectable 50 milliGRAM(s) SubCutaneous every 12 hours  gabapentin 300 milliGRAM(s) Oral every 12 hours  levETIRAcetam  Solution 500 milliGRAM(s) Oral every 12 hours  melatonin 3 milliGRAM(s) Oral at bedtime  metoprolol tartrate 12.5 milliGRAM(s) Oral two times a day  midodrine. 20 milliGRAM(s) Oral three times a day  norepinephrine Infusion 0.05 MICROgram(s)/kG/Min IV Continuous <Continuous>  pantoprazole  Injectable 40 milliGRAM(s) IV Push every 24 hours  polyethylene glycol 3350 17 Gram(s) Oral at bedtime  raloxifene 60 milliGRAM(s) Oral daily  scopolamine 1 mG/72 Hr(s) Patch 1 Patch Transdermal every 72 hours  senna 2 Tablet(s) Oral at bedtime  silver sulfADIAZINE 1% Cream 1 Application(s) Topical two times a day    PRN Medications  acetaminophen     Tablet .. 650 milliGRAM(s) Oral once PRN  acetaminophen     Tablet .. 650 milliGRAM(s) Oral every 6 hours PRN  aluminum hydroxide/magnesium hydroxide/simethicone Suspension 30 milliLiter(s) Oral every 4 hours PRN  ondansetron Injectable 4 milliGRAM(s) IV Push every 8 hours PRN  sodium chloride 0.65% Nasal 1 Spray(s) Both Nostrils daily PRN    Vitals  T(F): 100.5  HR: 82  BP: 99/57  RR: 18  SpO2: 94%    I&O's    02-07-24 @ 07:01  -  02-08-24 @ 07:00  --------------------------------------------------------  IN: 2651.7 mL / OUT: 500 mL / NET: 2151.7 mL        Physical Examination  General: Appears comfortable, Down Syndrome  Head: NGT  Cardiac: Regular rate and rhythm  Pulmonary: Improved turbulent mucoid secretion sounds on respiration b/l  Abdominal: Soft, nontender, and nondistended  Extremities: No pitting edema  Neurologic: Alert to name, nonverbal at baseline    Labs                        8.3    9.76  )-----------( 332      ( 07 Feb 2024 05:36 )             27.5     02-07    147<H>  |  109  |  13  ----------------------------<  119<H>  5.4<H>   |  31  |  0.5<L>    Ca    8.2<L>      07 Feb 2024 16:45  Mg     2.1     02-07    TPro  5.3<L>  /  Alb  2.4<L>  /  TBili  <0.2  /  DBili  x   /  AST  18  /  ALT  15  /  AlkPhos  80  02-07      Urinalysis Basic - ( 07 Feb 2024 16:45 )    Color: x / Appearance: x / SG: x / pH: x  Gluc: 119 mg/dL / Ketone: x  / Bili: x / Urobili: x   Blood: x / Protein: x / Nitrite: x   Leuk Esterase: x / RBC: x / WBC x   Sq Epi: x / Non Sq Epi: x / Bacteria: x      CAPILLARY BLOOD GLUCOSE        ABG - ( 07 Feb 2024 08:02 )  pH, Arterial: 7.49  pH, Blood: x     /  pCO2: 47    /  pO2: 122   / HCO3: 36    / Base Excess: 10.9  /  SaO2: 99.4                Imaging  CXR  Xray Chest 1 View- PORTABLE-Urgent:   ACC: [id number] EXAM:  XR CHEST PORTABLE URGENT 1V   ORDERED BY: SEVERINO PETERS     PROCEDURE DATE:  02/06/2024          INTERPRETATION:  Clinical History / Reason for exam: Enteric tube   placement    Comparison : Chest radiograph February 6, 2024.    Technique/Positioning: Single frontal chest x-ray obtained.    Findings:    Support devices: Enteric tube extends below left hemidiaphragm    Cardiac/mediastinum/hilum: Unchanged.    Lung parenchyma/Pleura: Unchanged bilateral opacities.    Skeleton/soft tissues: Unchanged.    Impression:    Unchanged bilateral opacities.  Enteric tube extends below left hemidiaphragm.    --- End of Report ---            SHYANN GREENE MD; Attending Radiologist  This document has been electronically signed. Feb 62024  2:33PM (02-06-24 @ 13:23)  Xray Chest 1 View- PORTABLE-Urgent:   ACC: [id number] EXAM:  XR CHEST PORTABLE URGENT 1V   ORDERED BY: SEVERINO PETERS     PROCEDURE DATE:  02/05/2024          INTERPRETATION:  Clinical History / Reason for exam: Nasogastric tube.    Comparison : Chest radiograph dated February 5, 2024.    Technique/Positioning: Portable frontal.    Findings:    Support devices: Nasogastric tube in place. Overlying EKG leads.    Cardiac/mediastinum/hilum: Stable.    Lung parenchyma/Pleura: Decreased patchy bilateral opacities, left   greater than right. No pneumothorax.    Skeleton/soft tissues: Stable.    Impression:    1. Decreased patchy bilateral opacities, left greater than right.    --- End of Report ---            STEVIE IRAHETA MD; Attending Radiologist  This document has been electronicallysigned. Feb 5 2024  3:49PM (02-05-24 @ 11:50)      CT

## 2024-02-08 NOTE — PROGRESS NOTE ADULT - ASSESSMENT
ASSESSMENT  58F w/ PMHx Down Syndrome, nonverbal at baseline, Hypothyroidism, Cerebral palsy and Seizure Disorders presents to the ED from nursing facility/group home presented to ED for respiratory distress and high grade fever.     IMPRESSION  #Fever  Unclear source , fever curve and WBC downtrending with caspofungin, unclear source    2/3 BCX NGTD     2/1 BCX NGTD     2/1 UCX   >=3 organisms. Probable collection contamination.    1/31 BCX NG    Rapid RVP Result: NotDetec (02-04-24 @ 10:28)    MRSA PCR Result.: Negative (02-03-24 @ 21:32)     UA without significant pyuria   Procalcitonin, Serum: 0.22 (02-01-24 @ 16:00)--> Procalcitonin, Serum: 0.15 (02-03-24 @ 11:13), downtrending ; unremarkable   MRSA PCR Result.: Negative (01-22-24 @ 05:30)  < from: Xray Chest 1 View-PORTABLE IMMEDIATE (Xray Chest 1 View-PORTABLE IMMEDIATE .) (02.01.24 @ 03:02) >  Bibasal opacities without significant change.   #Acute hypoxic respiratory failure- Gram Negative pneumonia   #1/20 BCX 1/4 bottles : Staphylococcus hominis- contaminant   Repeat CX NG   #Severe Sepsis on admission  #RSV + 1/21  #Elevated fungitell   Fungitell: 325 (01-20-24 @ 21:23)  Fungitell: 138 (11-07-23 @ 08:08)  Fungitell: 115 (11-02-23 @ 11:40)  Fungitell: >500 pg/mL (10.17.23 @ 17:20) s/p empiric caspo   #Full thickness ulcer right heel- Appreciate burn/podiatry evaluation.  #History of Right planter foot ulcers - two full thickness ulcers - serous drainage with mild erythema with OM  - MR Foot No Cont, Right (10.16.23 @ 21:51): IMPRESSION: 1.  Limited exam. 2.  Osteomyelitis of the first metatarsal stump. 3.  Osteomyelitis of the second toe distal phalanx.  - s/p excidional debridement to and including bone 1st metatarsal with partial 2nd digit amputation - 1st metatarsal head resected - Wound Cx Proteus ESBL   #CKD 2-3 Creatinine: 0.7 mg/dL (02.02.24 @ 04:59)      #History of buttock ulcer  #Down syndrome/Cerebral Palsy     RECOMMENDATIONS  - Fever, send BCX. Monitor LUE PIV- there is mild erythema, would remove   - Continue caspofungin 50mg q24h IV , hx unclear elevated fungitell. No clear source, will likely need empiric 14 day course. 2/18. No risk factors for PCP or other invasive fungal infection  - Continue meropenem 1g q8h IV end 2/8, essential 14 day course (1/21-1/27, 2/1-present)  - f/u serum aspergillus galactomannan   - f/u fungitell , histoplasma Ab serum   - CT Chest, AP when stable     If any questions, please send a message or call on [employer] Teams  Please continue to update ID with any pertinent new laboratory or radiographic findings.

## 2024-02-08 NOTE — PROGRESS NOTE ADULT - SUBJECTIVE AND OBJECTIVE BOX
HPI: 58F with Down syndrome, nonverbal at baseline, hypothyroidism, CP, and seizure disorder here from [facility] with fever and respiratory distress. Found to be septic on admission, from CAP/aspiration PNA, on IV vanco and meropenem, with course c/b continued fevers, and bacteremia with S. hominis. Also failed FEES, has NGT in place and will likely need PEG. Is requiring HFNC alternating with BiPAP. Is also on midodrine for hypotension. Patient is full code. Of note patient is under [facility]. Palliative care consulted for [facility].    INTERVAL EVENTS  2/6/24: patient appears comfortable  2/8/24: patient appears comfortable, on HFNC and pressors    ADVANCE DIRECTIVES:    [X ] Full Code [ ] DNR  MOLST  [ ]  Living Will  [ ]   DECISION MAKER(s):  [ ] Health Care Proxy(s)  [ ] Surrogate(s)  [ ] Guardian           Name(s): Phone Number(s): [facility]    BASELINE (I)ADL(s) (prior to admission):  Citrus: [ ]Total  [ ] Moderate [ ]Dependent  Palliative Performance Status Version 2:         %    http://npcrc.org/files/news/palliative_performance_scale_ppsv2.pdf    Allergies    No Known Allergies    Intolerances    MEDICATIONS  (STANDING):  albuterol/ipratropium for Nebulization 3 milliLiter(s) Nebulizer <User Schedule>  artificial  tears Solution 1 Drop(s) Both EYES two times a day  calcium carbonate   1250 mG (OsCal) 1 Tablet(s) Oral two times a day  caspofungin IVPB      caspofungin IVPB 50 milliGRAM(s) IV Intermittent every 24 hours  chlorhexidine 2% Cloths 1 Application(s) Topical daily  enoxaparin Injectable 50 milliGRAM(s) SubCutaneous every 12 hours  gabapentin 300 milliGRAM(s) Oral every 12 hours  levETIRAcetam  Solution 500 milliGRAM(s) Oral every 12 hours  melatonin 3 milliGRAM(s) Oral at bedtime  metoprolol tartrate 12.5 milliGRAM(s) Oral two times a day  midodrine. 20 milliGRAM(s) Oral three times a day  norepinephrine Infusion 0.05 MICROgram(s)/kG/Min (4.9 mL/Hr) IV Continuous <Continuous>  pantoprazole  Injectable 40 milliGRAM(s) IV Push every 24 hours  polyethylene glycol 3350 17 Gram(s) Oral at bedtime  raloxifene 60 milliGRAM(s) Oral daily  scopolamine 1 mG/72 Hr(s) Patch 1 Patch Transdermal every 72 hours  senna 2 Tablet(s) Oral at bedtime  silver sulfADIAZINE 1% Cream 1 Application(s) Topical two times a day    MEDICATIONS  (PRN):  acetaminophen     Tablet .. 650 milliGRAM(s) Oral once PRN Temp greater or equal to 38.5C (101.3F)  acetaminophen     Tablet .. 650 milliGRAM(s) Oral every 6 hours PRN Temp greater or equal to 38C (100.4F), Mild Pain (1 - 3)  aluminum hydroxide/magnesium hydroxide/simethicone Suspension 30 milliLiter(s) Oral every 4 hours PRN Dyspepsia  ondansetron Injectable 4 milliGRAM(s) IV Push every 8 hours PRN Nausea and/or Vomiting  sodium chloride 0.65% Nasal 1 Spray(s) Both Nostrils daily PRN Nasal Congestion    PRESENT SYMPTOMS: [X ]Unable to obtain due to poor mentation   Source if other than patient:  [ ]Family   [ ]Team     Pain: [ ]yes [ ]no  QOL impact -   Location -                    Aggravating factors -  Quality -  Radiation -  Timing-  Severity (0-10 scale):  Minimal acceptable level (0-10 scale):     CPOT:    https://www.sccm.org/getattachment/cxn47z70-6c5u-2c6b-0k2a-0041k2330x1u/Critical-Care-Pain-Observation-Tool-(CPOT)    PAIN AD Score: 0  http://geriatrictoolkit.[place].[email or web address]/cog/painad.pdf (press ctrl +  left click to view)    Dyspnea:                           [ ]None[ ]Mild [ ]Moderate [ ]Severe     Respiratory Distress Observation Scale (RDOS): 1  A score of 0 to 2 signifies little or no respiratory distress, 3 signifies mild distress, scores 4 to 6 indicate moderate distress, and scores greater than 7 signify severe distress  https://[email or web address]/sites/default/files/PDFS/770408-xwfosvvvmil-wwotxxst-jdwqfgutxyl-laota.pdf    Anxiety:                             [ ]None[ ]Mild [ ]Moderate [ ]Severe   Fatigue:                             [ ]None[ ]Mild [ ]Moderate [ ]Severe   Nausea:                             [ ]None[ ]Mild [ ]Moderate [ ]Severe   Loss of appetite:              [ ]None[ ]Mild [ ]Moderate [ ]Severe   Constipation:                    [ ]None[ ]Mild [ ]Moderate [ ]Severe    Other Symptoms:  [ ]All other review of systems negative     Palliative Performance Status Version 2:         %    http://[facility].org/files/news/palliative_performance_scale_ppsv2.pdf  PHYSICAL EXAM:  Vital Signs Last 24 Hrs  T(C): 37.7 (08 Feb 2024 11:47), Max: 38.1 (08 Feb 2024 04:00)  T(F): 99.8 (08 Feb 2024 11:47), Max: 100.5 (08 Feb 2024 04:00)  HR: 76 (08 Feb 2024 11:47) (58 - 85)  BP: 97/53 (08 Feb 2024 11:47) (83/49 - 118/71)  BP(mean): 70 (08 Feb 2024 11:47) (61 - 90)  RR: 20 (08 Feb 2024 11:47) (18 - 20)  SpO2: 95% (08 Feb 2024 11:47) (93% - 99%)    Parameters below as of 08 Feb 2024 11:47  Patient On (Oxygen Delivery Method): nasal cannula, high flow        GENERAL:  [X ] No acute distress [ ]Lethargic  [ ]Unarousable  [ ]Verbal  [ ]Non-Verbal [ ]Cachexia    BEHAVIORAL/PSYCH:  [ ]Alert and Oriented x  [ ] Anxiety [ ] Delirium [ ] Agitation [X ] Calm   EYES: [ X] No scleral icterus [ ] Scleral icterus [ ] Closed  ENMT:  [ ]Dry mouth  [ ]No external oral lesions [ X] No external ear or nose lesions  CARDIOVASCULAR:  [ ]Regular [ ]Irregular [ ]Tachy [ ]Not Tachy  [ ]Raheem [ ] Edema [ ] No edema  PULMONARY:  [ ]Tachypnea  [ ]Audible excessive secretions [X ] No labored breathing [ ] labored breathing  GASTROINTESTINAL: [ ]Soft  [ ]Distended  [ X]Not distended [ ]Non tender [ ]Tender  MUSCULOSKELETAL: [ ]No clubbing [ ] clubbing  [ X] No cyanosis [ ] cyanosis  NEUROLOGIC: [ ]No focal deficits  [ ]Follows commands  [ ]Does not follow commands  [X ]Cognitive impairment  [ ]Dysphagia  [ ]Dysarthria  [ ]Paresis   SKIN: [ ] Jaundiced [X ] Non-jaundiced [ ]Rash [ ]No Rash [ ] Warm [ ] Dry  MISC/LINES: [ ] ET tube [ ] Trach [ ]NGT/OGT [ ]PEG [ ]Madsen    CRITICAL CARE:  [ ] Shock Present  [ ]Septic [ ]Cardiogenic [ ]Neurologic [ ]Hypovolemic  [ ]  Vasopressors [ ]  Inotropes   [ ]Respiratory failure present [ ]Mechanical ventilation [ ]Non-invasive ventilatory support [ ]High flow  [ ]Acute  [ ]Chronic [ ]Hypoxic  [ ]Hypercarbic [ ]Other  [ ]Other organ failure     LABS: reviewed by me                                   8.4    8.53  )-----------( 261      ( 08 Feb 2024 05:39 )             28.2     02-08    142  |  102  |  11  ----------------------------<  129<H>  4.4   |  29  |  <0.5<L>    Ca    7.9<L>      08 Feb 2024 05:39  Mg     2.0     02-08          RADIOLOGY & ADDITIONAL STUDIES: reviewed by m    CXR 2/5/24    Support devices: Enteric tube satisfactory position.    Cardiac/ Mediastinum: unremarkable    Lungs/ Pleura: Diminishing left lung opacity/effusion. Stable smaller   right basilar opacity. No pneumothorax.    Skeletal/ soft tissues: Stable    PROTEIN CALORIE MALNUTRITION PRESENT: [ ]mild [ ]moderate [ ]severe [ ]underweight [ ]morbid obesity  https://www.andeal.org/vault/5490/web/files/ONC/Table_Clinical%20Characteristics%20to%20Document%20Malnutrition-White%20JV%20et%20al%202012.pdf    Height (cm): 136.4 (01-24-24 @ 09:52), 154.9 (11-17-23 @ 15:00), 145 (10-02-23 @ 12:00)  Weight (kg): 52.3 (01-21-24 @ 08:00), 60 (11-17-23 @ 15:00), 55.3 (10-02-23 @ 12:00)  BMI (kg/m2): 28.1 (01-24-24 @ 09:52), 21.8 (01-21-24 @ 08:00), 25 (11-17-23 @ 15:00)    [ ]PPSV2 < or = to 30% [ ]significant weight loss  [ ]poor nutritional intake  [ ]anasarca      [ ]Artificial Nutrition          Palliative Care Spiritual/Emotional Screening Tool Question  Severity (0-4):                    OR                    [X ] Unable to determine/NA  Score of 2 or greater indicates recommendation of Chaplaincy referral  Chaplaincy Referral: [ ] Yes [ ] Refused [ ] Following     Caregiver [place]:  [ ] Yes [ ] No    OR    [x ] Unable to determine. Will assess at later time if appropriate.  Social Work Referral [ ]  Patient and Family Centered Care Referral [ ]    Anticipatory Grief Present: [ ] Yes [ ] No    OR     [ x] Unable to determine. Will assess at later time if appropriate.  Social Work Referral [ ]  Patient and Family Centered Care Referral [ ]    REFERRALS:   [ ]Chaplaincy  [ ]Hospice  [ ]Child Life  [ ]Social Work  [ ]Case management [ ]Holistic Therapy     Palliative care education provided to patient and/or family    Goals of Care Document:     ______________  Wu Jenkins MD  Palliative Medicine  [facility]   of Geriatric and Palliative Medicine  [phone]

## 2024-02-08 NOTE — PROGRESS NOTE ADULT - ASSESSMENT
58F with Down syndrome, nonverbal at baseline, hypothyroidism, CP, and seizure disorder here from [facility] with fever and respiratory distress. Found to be septic on admission, from CAP/aspiration PNA, on IV vanco and meropenem, with course c/b continued fevers, and bacteremia with S. hominis. Also failed FEES, has NGT in place and will likely need PEG. Is requiring HFNC alternating with BiPAP. Is also on midodrine for hypotension. Patient is full code. Of note patient is under [place] [facility]. Palliative care consulted for [facility].

## 2024-02-08 NOTE — PROGRESS NOTE ADULT - SUBJECTIVE AND OBJECTIVE BOX
JERICHO TEA  58y, Female  Allergy: No Known Allergies      LOS  19d    CHIEF COMPLAINT: Respiratory Distress (08 Feb 2024 08:13)      INTERVAL EVENTS/HPI  - T(F): , Max: 100.5 (02-08-24 @ 04:00) fever  - WBC Count: 8.53 (02-08-24 @ 05:39)  WBC Count: 9.76 (02-07-24 @ 05:36)     - Creatinine: <0.5 (02-08-24 @ 05:39)  Creatinine: 0.5 (02-07-24 @ 16:45)     -   -   -     ROS  cannot obtain secondary to patient's sedation and/or mental status    VITALS:  T(F): 99.8, Max: 100.5 (02-08-24 @ 04:00)  HR: 76  BP: 97/53  RR: 20Vital Signs Last 24 Hrs  T(C): 37.7 (08 Feb 2024 11:47), Max: 38.1 (08 Feb 2024 04:00)  T(F): 99.8 (08 Feb 2024 11:47), Max: 100.5 (08 Feb 2024 04:00)  HR: 76 (08 Feb 2024 11:47) (58 - 85)  BP: 97/53 (08 Feb 2024 11:47) (83/49 - 118/71)  BP(mean): 70 (08 Feb 2024 11:47) (61 - 90)  RR: 20 (08 Feb 2024 11:47) (18 - 20)  SpO2: 95% (08 Feb 2024 11:47) (93% - 99%)    Parameters below as of 08 Feb 2024 11:47  Patient On (Oxygen Delivery Method): nasal cannula, high flow        PHYSICAL EXAM:  Gen: chronically ill appearing HFNC, contracted   HEENT: Normocephalic, atraumatic  Neck: supple, no lymphadenopathy  CV: Regular rate & regular rhythm  Lungs: decreased BS at bases, no fremitus  Abdomen: Soft, BS present  Ext: Warm, well perfused  Neuro: non focal, not following commands  Skin: no rash, no erythema  Lines: no phlebitis     FH: Non-contributory  Social Hx: Non-contributory    TESTS & MEASUREMENTS:                        8.4    8.53  )-----------( 261      ( 08 Feb 2024 05:39 )             28.2     02-08    142  |  102  |  11  ----------------------------<  129<H>  4.4   |  29  |  <0.5<L>    Ca    7.9<L>      08 Feb 2024 05:39  Mg     2.0     02-08    TPro  5.6<L>  /  Alb  2.4<L>  /  TBili  <0.2  /  DBili  x   /  AST  25  /  ALT  22  /  AlkPhos  74  02-08      LIVER FUNCTIONS - ( 08 Feb 2024 05:39 )  Alb: 2.4 g/dL / Pro: 5.6 g/dL / ALK PHOS: 74 U/L / ALT: 22 U/L / AST: 25 U/L / GGT: x           Urinalysis Basic - ( 08 Feb 2024 05:39 )    Color: x / Appearance: x / SG: x / pH: x  Gluc: 129 mg/dL / Ketone: x  / Bili: x / Urobili: x   Blood: x / Protein: x / Nitrite: x   Leuk Esterase: x / RBC: x / WBC x   Sq Epi: x / Non Sq Epi: x / Bacteria: x        Culture - Blood (collected 02-03-24 @ 11:13)  Source: .Blood None  Preliminary Report (02-07-24 @ 20:01):    No growth at 4 days    Culture - Blood (collected 02-01-24 @ 11:58)  Source: .Blood None  Final Report (02-06-24 @ 23:06):    No growth at 5 days    Culture - Urine (collected 02-01-24 @ 11:40)  Source: Clean Catch Clean Catch (Midstream)  Final Report (02-03-24 @ 00:06):    >=3 organisms. Probable collection contamination.    Culture - Blood (collected 01-31-24 @ 18:21)  Source: .Blood None  Final Report (02-06-24 @ 01:01):    No growth at 5 days    Culture - Blood (collected 01-31-24 @ 18:21)  Source: .Blood None  Final Report (02-06-24 @ 01:01):    No growth at 5 days    Culture - Urine (collected 01-23-24 @ 05:20)  Source: Clean Catch Clean Catch (Midstream)  Final Report (01-25-24 @ 00:26):    No growth    Culture - Blood (collected 01-22-24 @ 16:51)  Source: .Blood None  Final Report (01-28-24 @ 01:00):    No growth at 5 days    Culture - Urine (collected 01-21-24 @ 05:00)  Source: Clean Catch Clean Catch (Midstream)  Final Report (01-22-24 @ 07:15):    No growth    Culture - Blood (collected 01-20-24 @ 16:15)  Source: .Blood Blood-Peripheral  Final Report (01-25-24 @ 23:00):    No growth at 5 days    Culture - Blood (collected 01-20-24 @ 16:15)  Source: .Blood Blood-Peripheral  Gram Stain (01-21-24 @ 20:33):    Growth in aerobic bottle: Gram positive cocci in pairs  Final Report (01-22-24 @ 19:04):    Growth in aerobic bottle: Staphylococcus hominis    Isolation of Coagulase negative Staphylococcus from single blood culture    sets may represent    contamination. Contact the Microbiology Department at [phone] if    susceptibility testing is    clinically indicated.    Direct identification is available within approximately 3-5    hours either by Blood Panel Multiplexed PCR or Direct    MALDI-TOF. Details: [email or web address]  Organism: Blood Culture PCR (01-22-24 @ 19:04)  Organism: Blood Culture PCR (01-22-24 @ 19:04)      -  Coagulase negative Staphylococcus: Detec      Method Type: PCR            INFECTIOUS DISEASES TESTING  Rapid RVP Result: NotDetec (02-04-24 @ 10:28)  MRSA PCR Result.: Negative (02-03-24 @ 21:32)  Procalcitonin, Serum: 0.15 (02-03-24 @ 11:13)  Procalcitonin, Serum: 0.22 (02-01-24 @ 16:00)  MRSA PCR Result.: Negative (01-22-24 @ 05:30)  Legionella Antigen, Urine: Negative (01-21-24 @ 05:00)  Rapid RVP Result: Detected (01-21-24 @ 00:58)  Procalcitonin, Serum: 0.51 (01-20-24 @ 21:23)  Fungitell: 325 (01-20-24 @ 21:23)  Fungitell: 138 (11-07-23 @ 08:08)  Fungitell: 115 (11-02-23 @ 11:40)  Procalcitonin, Serum: 0.04 (11-02-23 @ 11:40)  Procalcitonin, Serum: 0.26 (10-24-23 @ 11:00)  MRSA PCR Result.: Negative (10-17-23 @ 17:20)  Fungitell: >500 (10-17-23 @ 17:20)  Procalcitonin, Serum: 4.36 (10-17-23 @ 17:20)  Procalcitonin, Serum: 4.18 (10-17-23 @ 12:35)  Procalcitonin, Serum: 0.07 (10-15-23 @ 07:28)  COVID-19 PCR: NotDetec (10-12-23 @ 09:14)  Procalcitonin, Serum: 0.40 (10-07-23 @ 10:54)  Legionella Antigen, Urine: Negative (09-30-23 @ 14:36)  MRSA PCR Result.: Negative (09-29-23 @ 16:50)  Procalcitonin, Serum: 9.78 (08-12-23 @ 11:25)  MRSA PCR Result.: Negative (08-12-23 @ 06:10)  Rapid RVP Result: NotDetec (08-12-23 @ 01:33)  Procalcitonin, Serum: 0.63 (08-10-23 @ 08:46)  Procalcitonin, Serum: 7.82 (08-04-23 @ 16:13)  MRSA PCR Result.: Negative (08-04-23 @ 14:52)  Rapid RVP Result: NotDetec (08-04-23 @ 03:00)      INFLAMMATORY MARKERS  Sedimentation Rate, Erythrocyte: 100 mm/Hr (02-03-24 @ 11:13)  C-Reactive Protein, Serum: 214.2 mg/L (02-03-24 @ 11:13)  C-Reactive Protein, Serum: 132.3 mg/L (02-01-24 @ 16:00)  Sedimentation Rate, Erythrocyte: 67 mm/Hr (10-15-23 @ 07:28)      RADIOLOGY & ADDITIONAL TESTS:  I have personally reviewed the last available Chest xray  CXR  Xray Chest 1 View- PORTABLE-Urgent:   ACC: [id number] EXAM:  XR CHEST PORTABLE URGENT 1V   ORDERED BY: SEVERINO PETERS     PROCEDURE DATE:  02/06/2024          INTERPRETATION:  Clinical History / Reason for exam: Enteric tube   placement    Comparison : Chest radiograph February 6, 2024.    Technique/Positioning: Single frontal chest x-ray obtained.    Findings:    Support devices: Enteric tube extends below left hemidiaphragm    Cardiac/mediastinum/hilum: Unchanged.    Lung parenchyma/Pleura: Unchanged bilateral opacities.    Skeleton/soft tissues: Unchanged.    Impression:    Unchanged bilateral opacities.  Enteric tube extends below left hemidiaphragm.    --- End of Report ---            SHYANN GREENE MD; Attending Radiologist  This document has been electronically signed. Feb 62024  2:33PM (02-06-24 @ 13:23)      CT      CARDIOLOGY TESTING  12 Lead ECG:   Ventricular Rate 118 BPM    Atrial Rate 118 BPM    P-R Interval 122 ms    QRS Duration 64 ms    Q-T Interval 328 ms    QTC Calculation(Bazett) 459 ms    P Axis 54 degrees    R Axis 93 degrees    T Axis 58 degrees    Diagnosis Line Sinus tachycardia  Rightward axis  Low voltage QRS  Borderline ECG    Confirmed by MARJAN MENDES MD ([id number]) on 2/2/2024 7:15:17 AM (02-01-24 @ 14:30)  12 Lead ECG:   Ventricular Rate 88 BPM    Atrial Rate 88 BPM    P-R Interval 112 ms    QRS Duration 64 ms    Q-T Interval 362 ms    QTC Calculation(Bazett) 438 ms    P Axis -11 degrees    R Axis 95 degrees    T Axis 59 degrees    Diagnosis Line Normal sinus rhythm  Rightward axis  Borderline ECG    Confirmed by caleb roberts ([id number]) on 1/31/2024 2:01:26 PM (01-31-24 @ 11:11)      MEDICATIONS  albuterol/ipratropium for Nebulization 3  artificial  tears Solution 1  calcium carbonate   1250 mG (OsCal) 1  caspofungin IVPB   caspofungin IVPB 50  chlorhexidine 2% Cloths 1  enoxaparin Injectable 50  gabapentin 300  levETIRAcetam  Solution 500  melatonin 3  metoprolol tartrate 12.5  midodrine. 20  norepinephrine Infusion 0.05  pantoprazole  Injectable 40  polyethylene glycol 3350 17  raloxifene 60  scopolamine 1 mG/72 Hr(s) Patch 1  senna 2  silver sulfADIAZINE 1% Cream 1      WEIGHT  Weight (kg): 52.3 (01-21-24 @ 08:00)  Creatinine: <0.5 mg/dL (02-08-24 @ 05:39)  Creatinine: 0.5 mg/dL (02-07-24 @ 16:45)      ANTIBIOTICS:  caspofungin IVPB 50 milliGRAM(s) IV Intermittent every 24 hours  caspofungin IVPB          All available historical records have been reviewed

## 2024-02-08 NOTE — PROGRESS NOTE ADULT - ASSESSMENT
58F w/ PMHx Down Syndrome, nonverbal at baseline, DVT on eliquis 5mg BID,  Hypothyroidism, aspiration pneumonia in prev admissions, Cerebral palsy and Seizure Disorders presents to the ED from nursing facility/group home ([facility]) presented to ED for respiratory distress and high grade fever. Patient found to be septic on admission. Admitted for management of acute respiratory distress likely 2/2 CAP or aspiration PNA.    #Sepsis POA (Febrile, Tachycardic, Leukocytosis)  #Acute Hypoxic Respiratory Failure secondary to Aspiration PNA / RSV  #Hx of Dysphagia - Puree Diet at baseline  - On admission: VBG Lactate 2.1 improved to wnl, procal 0.04 pH wnl and CO2 mildly elevated 61  - MRSA negative   - BCx (1/20): Staph hominis repeat BCx have been NGTD  - UCx: Neg  - RVP: RSV detected  - MRSA swab neg, urine strep neg, urine legionella neg  - D-dimer 605 and LE duplex neg  - CXR pending Bibasilar opacities without significant change.  - Fungitell chronically positive, received Caspofungin on previous admission  - Repeat Flu/Covid/MRSA nares negative  - C/w Duonebs q6hr  - C/w Meropenem 1g IV q8hr per ID  - C/w Caspofungin 50mg IV QD per ID  - C/w NG feeds  - Will likely need PEG, ate minced and moist at group home now failing S+S, has NGT  - Spoke w/ La Nena Vasquez [phone],  of [employer] on 2/6 faxed over paperwork for PEG consent, GI notified. Will likely take 1+ week before consent is obtained  - F/u Palliative care    #Hypotension in setting of Septic Shock  - Monitor BP as patient is spiking fevers  - Keep MAP >60  - C/w Midodrine 20mg q8  - On low dose levophed    #Elevated Troponin (Stabilized)  #Sinus Tachycardia (Controlled)  - Troponins: 25>37>35>35  - EKG shows sinus tachycardia, repeated  - Could likely be from hypovolemia/infectious state  - Lower Lopressor to 12.5mg BID w/ hold parameters    #Acute on Chronic Iron Deficiency Anemia (Stable)  - On admission: HgB 8.9 (previously 9.5)  - No evidence of hemorrhage  - B12 and Folate WNL normal  - Total Iron 15, Iron Saturation 6%, Ferritin 128 reflecting potential TIFFANI  - Hold off on iron supplementation in setting of suspected infection  - Keep HgB >7    #Hx of DVT of L Common Femoral Vein  - LE Duplex negative for DVT  - C/w Lovenox 50mg q12hr    #Seizure Disorder  - C/w Keppra 500mg BID     #Hx of Right Foot OM (1st Toe Stump and 2nd Distal Phalanx)  #Hx of Multiple Left Foot DTIs  #Hx of Sacral Wound  - Previous wound cultures positive for Proteus and ESBL E coli  - Patient underwent excisional debridement of ulcer of right foot (including 1st metatarsal) and partial amputation 2nd toe on previous admissions  - No acute surgical interventions from podiatry and burn  - C/w q2hr repositioning  - C/w local wound care  - Podiatry recalled 2/5 for foot wounds, updated recs placed     #Down Syndrome  #Cerebral Palsy  - C/w Gabapentin 300mg BID  - C/w Raloxifene 60mg QD    #Misc  #Code Status: Full Code  #DVT ppx: Lovenox BID  #GI prophylaxis: Protonix  #Diet: Tube feeds  #Disposition: SDU 58F w/ PMHx Down Syndrome, nonverbal at baseline, DVT on eliquis 5mg BID,  Hypothyroidism, aspiration pneumonia in prev admissions, Cerebral palsy and Seizure Disorders presents to the ED from nursing facility/group home ([facility]) presented to ED for respiratory distress and high grade fever. Patient found to be septic on admission. Admitted for management of acute respiratory distress likely 2/2 CAP or aspiration PNA.    #Sepsis POA (Febrile, Tachycardic, Leukocytosis)  #Acute Hypoxic Respiratory Failure secondary to Aspiration PNA / RSV  #Hx of Dysphagia - Puree Diet at baseline  - On admission: VBG Lactate 2.1 improved to wnl, procal 0.04 pH wnl and CO2 mildly elevated 61  - MRSA negative   - BCx (1/20): Staph hominis repeat BCx have been NGTD  - UCx: Neg  - RVP: RSV detected  - MRSA swab neg, urine strep neg, urine legionella neg  - D-dimer 605 and LE duplex neg  - CXR pending Bibasilar opacities without significant change.  - Fungitell chronically positive, received Caspofungin on previous admission  - Repeat Flu/Covid/MRSA nares negative  - C/w Duonebs q6hr  - C/w Meropenem 1g IV q8hr  - C/w Caspofungin 50mg IV QD  - C/w NG feeds, Increase free water 250mL q4hr  - Will likely need PEG, ate minced and moist at group home now failing S+S, has NGT  - Spoke w/ La Nena Vasquez [phone],  of [employer] on 2/6 faxed over paperwork for PEG consent, GI notified. Will likely take 1+ week before consent is obtained  - F/u Palliative care    #Hypotension in setting of Septic Shock  - Monitor BP as patient is spiking fevers  - Keep MAP >60  - C/w Midodrine 20mg q8hr  - Wean levophed    #Elevated Troponin (Stabilized)  #Sinus Tachycardia (Controlled)  - Troponins: 25>37>35>35  - EKG shows sinus tachycardia, repeated  - Could likely be from hypovolemia/infectious state  - C/w Lopressor to 12.5mg BID w/ hold parameters    #Acute on Chronic Iron Deficiency Anemia (Stable)  - On admission: HgB 8.9 (previously 9.5)  - No evidence of hemorrhage  - B12 and Folate WNL normal  - Total Iron 15, Iron Saturation 6%, Ferritin 128 reflecting potential TIFFANI  - Hold off on iron supplementation in setting of suspected infection  - Keep HgB >7    #Hx of DVT of L Common Femoral Vein  - LE Duplex negative for DVT  - C/w Lovenox 50mg q12hr    #Seizure Disorder  - C/w Keppra 500mg BID     #Hx of Right Foot OM (1st Toe Stump and 2nd Distal Phalanx)  #Hx of Multiple Left Foot DTIs  #Hx of Sacral Wound  - Previous wound cultures positive for Proteus and ESBL E coli  - Patient underwent excisional debridement of ulcer of right foot (including 1st metatarsal) and partial amputation 2nd toe on previous admissions  - No acute surgical interventions from podiatry and burn  - C/w q2hr repositioning  - C/w local wound care  - Podiatry recalled 2/5 for foot wounds, updated recs placed     #Down Syndrome  #Cerebral Palsy  - C/w Gabapentin 300mg BID  - C/w Raloxifene 60mg QD    #Misc  #Code Status: Full Code  #DVT ppx: Lovenox BID  #GI prophylaxis: Protonix  #Diet: Tube feeds  #Disposition: SDU

## 2024-02-08 NOTE — PROGRESS NOTE ADULT - SUBJECTIVE AND OBJECTIVE BOX
Patient is a 58y old  Female who presents with a chief complaint of Respiratory Distress (08 Feb 2024 07:52)        Over Night Events:  remains critically ill on HFNCO2.  On Leovphed.          ROS:     All ROS are negative except HPI         PHYSICAL EXAM    ICU Vital Signs Last 24 Hrs  T(C): 37.8 (08 Feb 2024 08:12), Max: 38.1 (08 Feb 2024 04:00)  T(F): 100 (08 Feb 2024 08:12), Max: 100.5 (08 Feb 2024 04:00)  HR: 83 (08 Feb 2024 08:12) (54 - 85)  BP: 104/58 (08 Feb 2024 08:12) (83/49 - 118/71)  BP(mean): 79 (08 Feb 2024 08:12) (61 - 90)  ABP: --  ABP(mean): --  RR: 20 (08 Feb 2024 08:12) (18 - 20)  SpO2: 99% (08 Feb 2024 08:12) (93% - 100%)    O2 Parameters below as of 08 Feb 2024 08:12  Patient On (Oxygen Delivery Method): nasal cannula, high flow            CONSTITUTIONAL:  Ill appearing in  NAD    ENT:   Airway patent,   Mouth with normal mucosa.     EYES:   Pupils equal,   Round and reactive to light.    CARDIAC:   Normal rate,   Regular rhythm.        RESPIRATORY:   No wheezing  Bilateral BS  Normal chest expansion  Not tachypneic,  No use of accessory muscles    GASTROINTESTINAL:  Abdomen soft,   Non-tender,   No guarding,   + BS    MUSCULOSKELETAL:   Contracted  No clubbing, cyanosis    NEUROLOGICAL:   Alert       SKIN:   Skin normal color for race,   No evidence of rash.    PSYCHIATRIC:   Normal mood and affect.   No apparent risk to self or others.          02-07-24 @ 07:01  -  02-08-24 @ 07:00  --------------------------------------------------------  IN:    Enteral Tube Flush: 600 mL    IV PiggyBack: 350 mL    Jevity 1.2: 1200 mL    Lactated Ringers Bolus: 500 mL    Norepinephrine: 1.7 mL  Total IN: 2651.7 mL    OUT:    Voided (mL): 500 mL  Total OUT: 500 mL    Total NET: 2151.7 mL          LABS:                            8.3    9.76  )-----------( 332      ( 07 Feb 2024 05:36 )             27.5                                               02-07    147<H>  |  109  |  13  ----------------------------<  119<H>  5.4<H>   |  31  |  0.5<L>    Ca    8.2<L>      07 Feb 2024 16:45  Mg     2.1     02-07    TPro  5.3<L>  /  Alb  2.4<L>  /  TBili  <0.2  /  DBili  x   /  AST  18  /  ALT  15  /  AlkPhos  80  02-07                                             Urinalysis Basic - ( 07 Feb 2024 16:45 )    Color: x / Appearance: x / SG: x / pH: x  Gluc: 119 mg/dL / Ketone: x  / Bili: x / Urobili: x   Blood: x / Protein: x / Nitrite: x   Leuk Esterase: x / RBC: x / WBC x   Sq Epi: x / Non Sq Epi: x / Bacteria: x                                                  LIVER FUNCTIONS - ( 07 Feb 2024 05:36 )  Alb: 2.4 g/dL / Pro: 5.3 g/dL / ALK PHOS: 80 U/L / ALT: 15 U/L / AST: 18 U/L / GGT: x                                                                                                                                   ABG - ( 07 Feb 2024 08:02 )  pH, Arterial: 7.49  pH, Blood: x     /  pCO2: 47    /  pO2: 122   / HCO3: 36    / Base Excess: 10.9  /  SaO2: 99.4                MEDICATIONS  (STANDING):  albuterol/ipratropium for Nebulization 3 milliLiter(s) Nebulizer <User Schedule>  artificial  tears Solution 1 Drop(s) Both EYES two times a day  calcium carbonate   1250 mG (OsCal) 1 Tablet(s) Oral two times a day  caspofungin IVPB 50 milliGRAM(s) IV Intermittent every 24 hours  caspofungin IVPB      chlorhexidine 2% Cloths 1 Application(s) Topical daily  enoxaparin Injectable 50 milliGRAM(s) SubCutaneous every 12 hours  gabapentin 300 milliGRAM(s) Oral every 12 hours  levETIRAcetam  Solution 500 milliGRAM(s) Oral every 12 hours  melatonin 3 milliGRAM(s) Oral at bedtime  metoprolol tartrate 12.5 milliGRAM(s) Oral two times a day  midodrine. 20 milliGRAM(s) Oral three times a day  norepinephrine Infusion 0.05 MICROgram(s)/kG/Min (4.9 mL/Hr) IV Continuous <Continuous>  pantoprazole  Injectable 40 milliGRAM(s) IV Push every 24 hours  polyethylene glycol 3350 17 Gram(s) Oral at bedtime  raloxifene 60 milliGRAM(s) Oral daily  scopolamine 1 mG/72 Hr(s) Patch 1 Patch Transdermal every 72 hours  senna 2 Tablet(s) Oral at bedtime  silver sulfADIAZINE 1% Cream 1 Application(s) Topical two times a day    MEDICATIONS  (PRN):  acetaminophen     Tablet .. 650 milliGRAM(s) Oral once PRN Temp greater or equal to 38.5C (101.3F)  acetaminophen     Tablet .. 650 milliGRAM(s) Oral every 6 hours PRN Temp greater or equal to 38C (100.4F), Mild Pain (1 - 3)  aluminum hydroxide/magnesium hydroxide/simethicone Suspension 30 milliLiter(s) Oral every 4 hours PRN Dyspepsia  ondansetron Injectable 4 milliGRAM(s) IV Push every 8 hours PRN Nausea and/or Vomiting  sodium chloride 0.65% Nasal 1 Spray(s) Both Nostrils daily PRN Nasal Congestion      New X-rays reviewed:                                                                                  ECHO    CXR interpreted by me:

## 2024-02-09 LAB
ALBUMIN SERPL ELPH-MCNC: 2.5 G/DL — LOW (ref 3.5–5.2)
ALP SERPL-CCNC: 87 U/L — SIGNIFICANT CHANGE UP (ref 30–115)
ALT FLD-CCNC: 26 U/L — SIGNIFICANT CHANGE UP (ref 0–41)
ANION GAP SERPL CALC-SCNC: 10 MMOL/L — SIGNIFICANT CHANGE UP (ref 7–14)
AST SERPL-CCNC: 24 U/L — SIGNIFICANT CHANGE UP (ref 0–41)
BASOPHILS # BLD AUTO: 0.07 K/UL — SIGNIFICANT CHANGE UP (ref 0–0.2)
BASOPHILS NFR BLD AUTO: 0.7 % — SIGNIFICANT CHANGE UP (ref 0–1)
BILIRUB SERPL-MCNC: <0.2 MG/DL — SIGNIFICANT CHANGE UP (ref 0.2–1.2)
BUN SERPL-MCNC: 13 MG/DL — SIGNIFICANT CHANGE UP (ref 10–20)
CALCIUM SERPL-MCNC: 8.3 MG/DL — LOW (ref 8.4–10.5)
CHLORIDE SERPL-SCNC: 100 MMOL/L — SIGNIFICANT CHANGE UP (ref 98–110)
CO2 SERPL-SCNC: 32 MMOL/L — SIGNIFICANT CHANGE UP (ref 17–32)
CREAT SERPL-MCNC: 0.5 MG/DL — LOW (ref 0.7–1.5)
EGFR: 109 ML/MIN/1.73M2 — SIGNIFICANT CHANGE UP
EOSINOPHIL # BLD AUTO: 0.53 K/UL — SIGNIFICANT CHANGE UP (ref 0–0.7)
EOSINOPHIL NFR BLD AUTO: 5.5 % — SIGNIFICANT CHANGE UP (ref 0–8)
GLUCOSE SERPL-MCNC: 128 MG/DL — HIGH (ref 70–99)
H CAPSUL MYC AB FLD-ACNC: SIGNIFICANT CHANGE UP
H CAPSUL YST AB FLD-ACNC: SIGNIFICANT CHANGE UP
HCT VFR BLD CALC: 30.1 % — LOW (ref 37–47)
HGB BLD-MCNC: 9 G/DL — LOW (ref 12–16)
IMM GRANULOCYTES NFR BLD AUTO: 1.7 % — HIGH (ref 0.1–0.3)
LYMPHOCYTES # BLD AUTO: 1.42 K/UL — SIGNIFICANT CHANGE UP (ref 1.2–3.4)
LYMPHOCYTES # BLD AUTO: 14.8 % — LOW (ref 20.5–51.1)
MAGNESIUM SERPL-MCNC: 2.2 MG/DL — SIGNIFICANT CHANGE UP (ref 1.8–2.4)
MCHC RBC-ENTMCNC: 23.4 PG — LOW (ref 27–31)
MCHC RBC-ENTMCNC: 29.9 G/DL — LOW (ref 32–37)
MCV RBC AUTO: 78.4 FL — LOW (ref 81–99)
MONOCYTES # BLD AUTO: 0.67 K/UL — HIGH (ref 0.1–0.6)
MONOCYTES NFR BLD AUTO: 7 % — SIGNIFICANT CHANGE UP (ref 1.7–9.3)
NEUTROPHILS # BLD AUTO: 6.76 K/UL — HIGH (ref 1.4–6.5)
NEUTROPHILS NFR BLD AUTO: 70.3 % — SIGNIFICANT CHANGE UP (ref 42.2–75.2)
NRBC # BLD: 0 /100 WBCS — SIGNIFICANT CHANGE UP (ref 0–0)
PLATELET # BLD AUTO: 423 K/UL — HIGH (ref 130–400)
PMV BLD: 11.1 FL — HIGH (ref 7.4–10.4)
POTASSIUM SERPL-MCNC: 4.4 MMOL/L — SIGNIFICANT CHANGE UP (ref 3.5–5)
POTASSIUM SERPL-SCNC: 4.4 MMOL/L — SIGNIFICANT CHANGE UP (ref 3.5–5)
PROT SERPL-MCNC: 5.9 G/DL — LOW (ref 6–8)
RBC # BLD: 3.84 M/UL — LOW (ref 4.2–5.4)
RBC # FLD: 20 % — HIGH (ref 11.5–14.5)
SODIUM SERPL-SCNC: 142 MMOL/L — SIGNIFICANT CHANGE UP (ref 135–146)
WBC # BLD: 9.61 K/UL — SIGNIFICANT CHANGE UP (ref 4.8–10.8)
WBC # FLD AUTO: 9.61 K/UL — SIGNIFICANT CHANGE UP (ref 4.8–10.8)

## 2024-02-09 PROCEDURE — 99233 SBSQ HOSP IP/OBS HIGH 50: CPT

## 2024-02-09 PROCEDURE — 99291 CRITICAL CARE FIRST HOUR: CPT

## 2024-02-09 PROCEDURE — 71045 X-RAY EXAM CHEST 1 VIEW: CPT | Mod: 26

## 2024-02-09 RX ORDER — SODIUM CHLORIDE 0.65 %
2 AEROSOL, SPRAY (ML) NASAL THREE TIMES A DAY
Refills: 0 | Status: DISCONTINUED | OUTPATIENT
Start: 2024-02-09 | End: 2024-04-08

## 2024-02-09 RX ADMIN — GABAPENTIN 300 MILLIGRAM(S): 400 CAPSULE ORAL at 18:04

## 2024-02-09 RX ADMIN — Medication 1 TABLET(S): at 05:06

## 2024-02-09 RX ADMIN — Medication 12.5 MILLIGRAM(S): at 18:04

## 2024-02-09 RX ADMIN — MIDODRINE HYDROCHLORIDE 20 MILLIGRAM(S): 2.5 TABLET ORAL at 05:04

## 2024-02-09 RX ADMIN — LEVETIRACETAM 500 MILLIGRAM(S): 250 TABLET, FILM COATED ORAL at 05:08

## 2024-02-09 RX ADMIN — PANTOPRAZOLE SODIUM 40 MILLIGRAM(S): 20 TABLET, DELAYED RELEASE ORAL at 05:05

## 2024-02-09 RX ADMIN — Medication 3 MILLIGRAM(S): at 21:04

## 2024-02-09 RX ADMIN — Medication 3 MILLILITER(S): at 20:10

## 2024-02-09 RX ADMIN — SCOPALAMINE 1 PATCH: 1 PATCH, EXTENDED RELEASE TRANSDERMAL at 22:07

## 2024-02-09 RX ADMIN — Medication 1 APPLICATION(S): at 18:06

## 2024-02-09 RX ADMIN — MIDODRINE HYDROCHLORIDE 20 MILLIGRAM(S): 2.5 TABLET ORAL at 18:05

## 2024-02-09 RX ADMIN — RALOXIFENE HYDROCHLORIDE 60 MILLIGRAM(S): 60 TABLET, COATED ORAL at 11:48

## 2024-02-09 RX ADMIN — Medication 650 MILLIGRAM(S): at 06:54

## 2024-02-09 RX ADMIN — GABAPENTIN 300 MILLIGRAM(S): 400 CAPSULE ORAL at 05:04

## 2024-02-09 RX ADMIN — MEROPENEM 100 MILLIGRAM(S): 1 INJECTION INTRAVENOUS at 21:03

## 2024-02-09 RX ADMIN — Medication 2 SPRAY(S): at 21:05

## 2024-02-09 RX ADMIN — Medication 1 DROP(S): at 18:07

## 2024-02-09 RX ADMIN — MEROPENEM 100 MILLIGRAM(S): 1 INJECTION INTRAVENOUS at 18:02

## 2024-02-09 RX ADMIN — CHLORHEXIDINE GLUCONATE 1 APPLICATION(S): 213 SOLUTION TOPICAL at 11:44

## 2024-02-09 RX ADMIN — ENOXAPARIN SODIUM 50 MILLIGRAM(S): 100 INJECTION SUBCUTANEOUS at 18:05

## 2024-02-09 RX ADMIN — ENOXAPARIN SODIUM 50 MILLIGRAM(S): 100 INJECTION SUBCUTANEOUS at 05:01

## 2024-02-09 RX ADMIN — LEVETIRACETAM 500 MILLIGRAM(S): 250 TABLET, FILM COATED ORAL at 18:05

## 2024-02-09 RX ADMIN — Medication 3 MILLILITER(S): at 13:38

## 2024-02-09 RX ADMIN — Medication 1 APPLICATION(S): at 05:06

## 2024-02-09 RX ADMIN — Medication 3 MILLILITER(S): at 07:59

## 2024-02-09 RX ADMIN — Medication 2 SPRAY(S): at 18:02

## 2024-02-09 RX ADMIN — MIDODRINE HYDROCHLORIDE 20 MILLIGRAM(S): 2.5 TABLET ORAL at 11:44

## 2024-02-09 RX ADMIN — MEROPENEM 100 MILLIGRAM(S): 1 INJECTION INTRAVENOUS at 05:00

## 2024-02-09 RX ADMIN — Medication 1 DROP(S): at 05:05

## 2024-02-09 RX ADMIN — CASPOFUNGIN ACETATE 260 MILLIGRAM(S): 7 INJECTION, POWDER, LYOPHILIZED, FOR SOLUTION INTRAVENOUS at 09:59

## 2024-02-09 RX ADMIN — Medication 1 TABLET(S): at 18:06

## 2024-02-09 NOTE — PROGRESS NOTE ADULT - ASSESSMENT
58F w/ PMHx Down Syndrome, nonverbal at baseline, DVT on eliquis 5mg BID,  Hypothyroidism, aspiration pneumonia in prev admissions, Cerebral palsy and Seizure Disorders presents to the ED from nursing facility/group home ([facility]) presented to ED for respiratory distress and high grade fever. Patient found to be septic on admission. Admitted for management of acute respiratory distress likely 2/2 CAP or aspiration PNA.    #Sepsis POA (Febrile, Tachycardic, Leukocytosis)  #Acute Hypoxic Respiratory Failure secondary to Aspiration PNA / RSV  #Hx of Dysphagia - Puree Diet at baseline  - On admission: VBG Lactate 2.1 improved to wnl, procal 0.04 pH wnl and CO2 mildly elevated 61  - MRSA negative   - BCx (1/20): Staph hominis repeat BCx have been NGTD  - UCx: Neg  - RVP: RSV detected  - MRSA swab neg, urine strep neg, urine legionella neg  - D-dimer 605 and LE duplex neg  - CXR pending Bibasilar opacities without significant change.  - Fungitell chronically positive, received Caspofungin on previous admission, repeat Fungitell 63  - Repeat Flu/Covid/MRSA nares negative  - C/w Duonebs q6hr  - C/w Meropenem 1g IV q8hr  - C/w Caspofungin 50mg IV QD  - C/w NG feeds, Increase free water 250mL q4hr  - Will likely need PEG, ate minced and moist at group home now failing S+S, has NGT  - Spoke w/ La Nena Vasquez [phone],  of [employer] on 2/6 faxed over paperwork for PEG consent, GI notified. Will likely take 1+ week before consent is obtained  - F/u Palliative care    #Hypotension in setting of Septic Shock  - Monitor BP as patient is spiking fevers  - Keep MAP >60  - C/w Midodrine 20mg q8hr  - Wean levophed    #Elevated Troponin (Stabilized)  #Sinus Tachycardia (Controlled)  - Troponins: 25>37>35>35  - EKG shows sinus tachycardia, repeated  - Could likely be from hypovolemia/infectious state  - C/w Lopressor to 12.5mg BID w/ hold parameters    #Acute on Chronic Iron Deficiency Anemia (Stable)  - On admission: HgB 8.9 (previously 9.5)  - No evidence of hemorrhage  - B12 and Folate WNL normal  - Total Iron 15, Iron Saturation 6%, Ferritin 128 reflecting potential TIFFANI  - Hold off on iron supplementation in setting of suspected infection  - Keep HgB >7    #Hx of DVT of L Common Femoral Vein  - LE Duplex negative for DVT  - C/w Lovenox 50mg q12hr    #Seizure Disorder  - C/w Keppra 500mg BID     #Hx of Right Foot OM (1st Toe Stump and 2nd Distal Phalanx)  #Hx of Multiple Left Foot DTIs  #Hx of Sacral Wound  - Previous wound cultures positive for Proteus and ESBL E coli  - Patient underwent excisional debridement of ulcer of right foot (including 1st metatarsal) and partial amputation 2nd toe on previous admissions  - No acute surgical interventions from podiatry and burn  - C/w q2hr repositioning  - C/w local wound care  - Podiatry recalled 2/5 for foot wounds, updated recs placed     #Down Syndrome  #Cerebral Palsy  - C/w Gabapentin 300mg BID  - C/w Raloxifene 60mg QD    #Misc  #Code Status: Full Code  #DVT ppx: Lovenox BID  #GI prophylaxis: Protonix  #Diet: Tube feeds  #Disposition: SDU 58F w/ PMHx Down Syndrome, nonverbal at baseline, DVT on eliquis 5mg BID,  Hypothyroidism, aspiration pneumonia in prev admissions, Cerebral palsy and Seizure Disorders presents to the ED from nursing facility/group home ([facility]) presented to ED for respiratory distress and high grade fever. Patient found to be septic on admission. Admitted for management of acute respiratory distress likely 2/2 CAP or aspiration PNA.    #Sepsis POA (Febrile, Tachycardic, Leukocytosis)  #Acute Hypoxic Respiratory Failure secondary to Aspiration PNA / RSV  #Hx of Dysphagia - Puree Diet at baseline  - On admission: VBG Lactate 2.1 improved to wnl, procal 0.04 pH wnl and CO2 mildly elevated 61  - MRSA negative   - BCx (1/20): Staph hominis repeat BCx have been NGTD  - UCx: Neg  - RVP: RSV detected  - MRSA swab neg, urine strep neg, urine legionella neg  - D-dimer 605 and LE duplex neg  - CXR pending Bibasilar opacities without significant change.  - Fungitell chronically positive, received Caspofungin on previous admission, repeat Fungitell 63  - Repeat Flu/Covid/MRSA nares negative  - Will likely need PEG, ate minced and moist at group home now failing S+S, has NGT  - Spoke w/ La Nena Vasquez [phone],  of [employer] on 2/6 faxed over paperwork for PEG consent, GI notified. Will likely take 1+ week before consent is obtained  - Per Palliative care needs terminal diagnosis before CAB would consider DNR/DNI  - C/w Duonebs q6hr, Chest Vest, Saline Spray  - C/w Meropenem 1g IV q8hr for now  - C/w Caspofungin 50mg IV QD for now  - C/w NG feeds, free water 250mL q4hr  - CXR 2/9 shows increased opacities on L lung, f/u bedside lung US today    #Hypotension in setting of Septic Shock  - Monitor BP as patient is spiking fevers  - Keep MAP >60  - C/w Midodrine 20mg q8hr  - Wean levophed    #Elevated Troponin (Stabilized)  #Sinus Tachycardia (Controlled)  - Troponins: 25>37>35>35  - EKG shows sinus tachycardia, repeated  - Could likely be from hypovolemia/infectious state  - C/w Lopressor to 12.5mg BID w/ hold parameters    #Acute on Chronic Iron Deficiency Anemia (Stable)  - On admission: HgB 8.9 (previously 9.5)  - No evidence of hemorrhage  - B12 and Folate WNL normal  - Total Iron 15, Iron Saturation 6%, Ferritin 128 reflecting potential TIFFANI  - Hold off on iron supplementation in setting of suspected infection  - Keep HgB >7    #Hx of DVT of L Common Femoral Vein  - LE Duplex negative for DVT  - C/w Lovenox 50mg q12hr    #Seizure Disorder  - C/w Keppra 500mg BID     #Hx of Right Foot OM (1st Toe Stump and 2nd Distal Phalanx)  #Hx of Multiple Left Foot DTIs  #Hx of Sacral Wound  - Previous wound cultures positive for Proteus and ESBL E coli  - Patient underwent excisional debridement of ulcer of right foot (including 1st metatarsal) and partial amputation 2nd toe on previous admissions  - No acute surgical interventions from podiatry and burn  - C/w q2hr repositioning  - C/w local wound care  - Podiatry recalled 2/5 for foot wounds, updated recs placed     #Down Syndrome  #Cerebral Palsy  - C/w Gabapentin 300mg BID  - C/w Raloxifene 60mg QD    #Misc  #Code Status: Full Code  #DVT ppx: Lovenox BID  #GI prophylaxis: Protonix  #Diet: Tube feeds  #Disposition: SDU

## 2024-02-09 NOTE — PROGRESS NOTE ADULT - ATTENDING COMMENTS
IMPRESSION:    Acute hypoxemic respiratory failure  Likely aspiration pneumonia   Sepsis POA  Septic shock  on Levophed   Recurrent aspiration pneumonia / prior intubation  HO GI bleed  HO OM  HO recent duodenal perforation   HO polymicrobial bacteremia   H/o CP, DS  H/o seizures    Plan as outlined above

## 2024-02-09 NOTE — PROGRESS NOTE ADULT - ASSESSMENT
ASSESSMENT  58F w/ PMHx Down Syndrome, nonverbal at baseline, Hypothyroidism, Cerebral palsy and Seizure Disorders presents to the ED from nursing facility/group home presented to ED for respiratory distress and high grade fever.     IMPRESSION  #Fever, resolving   Unclear source , fever curve and WBC downtrending with caspofungin, unclear source    2/3 BCX NGTD     2/1 BCX NGTD     2/1 UCX   >=3 organisms. Probable collection contamination.    1/31 BCX NG    Rapid RVP Result: NotDetec (02-04-24 @ 10:28)    MRSA PCR Result.: Negative (02-03-24 @ 21:32)     UA without significant pyuria   Procalcitonin, Serum: 0.22 (02-01-24 @ 16:00)--> Procalcitonin, Serum: 0.15 (02-03-24 @ 11:13), downtrending ; unremarkable   MRSA PCR Result.: Negative (01-22-24 @ 05:30)  < from: Xray Chest 1 View-PORTABLE IMMEDIATE (Xray Chest 1 View-PORTABLE IMMEDIATE .) (02.01.24 @ 03:02) >  Bibasal opacities without significant change.   #Acute hypoxic respiratory failure- Gram Negative pneumonia   #1/20 BCX 1/4 bottles : Staphylococcus hominis- contaminant   Repeat CX NG   #Severe Sepsis on admission  #RSV + 1/21  #Elevated fungitell   Fungitell: 63 (02-06-24 @ 11:27)  Fungitell: 325 (01-20-24 @ 21:23)  Fungitell: 138 (11-07-23 @ 08:08)  Fungitell: 115 (11-02-23 @ 11:40)  Fungitell: >500 pg/mL (10.17.23 @ 17:20) s/p empiric caspo   #Full thickness ulcer right heel- Appreciate burn/podiatry evaluation.  #History of Right planter foot ulcers - two full thickness ulcers - serous drainage with mild erythema with OM  - MR Foot No Cont, Right (10.16.23 @ 21:51): IMPRESSION: 1.  Limited exam. 2.  Osteomyelitis of the first metatarsal stump. 3.  Osteomyelitis of the second toe distal phalanx.  - s/p excidional debridement to and including bone 1st metatarsal with partial 2nd digit amputation - 1st metatarsal head resected - Wound Cx Proteus ESBL   #CKD 2-3 Creatinine: 0.7 mg/dL (02.02.24 @ 04:59)      #History of buttock ulcer  #Down syndrome/Cerebral Palsy     RECOMMENDATIONS  - f/u BCX  - Fungitell started downtrending 1 day after Caspo started, doubt related, but at this point given critical illness would plan on completing a course  - Continue caspofungin 50mg q24h IV , hx unclear elevated fungitell. No clear source, will likely need empiric 14 day course. 2/18. No risk factors for PCP or other invasive fungal infection  - D/C meropenem 1g q8h IV, essential 14 day course (1/21-1/27, 2/1-present). Only restart if hemodynamic compromise,   - f/u serum aspergillus galactomannan   - f/u  , histoplasma Ab serum   - CT Chest, AP when stable     If any questions, please send a message or call on [employer]  Please continue to update ID with any pertinent new laboratory or radiographic findings.

## 2024-02-09 NOTE — PROGRESS NOTE ADULT - ASSESSMENT
IMPRESSION:    Acute hypoxemic respiratory failure  Likely aspiration pneumonia   Sepsis POA  Septic shock  on Levophed   Recurrent aspiration pneumonia / prior intubation  HO GI bleed  HO OM  HO recent duodenal perforation   HO polymicrobial bacteremia   H/o CP, DS  H/o seizures    PLAN:    CNS:  Avoid CNS Depressant, AED. MS at baseline.     HEENT: Oral care.     PULMONARY: HOB at 45 degrees.  Aspiration precaution. Wean FiO2 Keep spo2 more than 92%.  Aggressive pulm toilet, frequent suctioning.  Might need MV,     CARDIOVASCULAR: Keep MAP more than 60. Avoid overload. Wean levophed as able.  C/w midodrine 20mg.  Goal directed fluid resuscitation.      GI: Protonix. NG  feeding.  Speech and swallow recs noted. Might need PEG when more stable    INFECTIOUS DISEASE:    c/w Meropenem.  DC Caspo    HEMATOLOGICAL: change to Lovenox therapeutic.  Monitor CBC     ENDOCRINE:  Follow up FS.  Insulin protocol if needed.    MUSCULOSKELETAL: Bedrest.  Off loading.  Wound care.      Prognosis very poor.    Goals of care.  Palliative care evaluation     SDU.

## 2024-02-09 NOTE — PROGRESS NOTE ADULT - SUBJECTIVE AND OBJECTIVE BOX
HPI: 58F with Down syndrome, nonverbal at baseline, hypothyroidism, CP, and seizure disorder here from [facility] with fever and respiratory distress. Found to be septic on admission, from CAP/aspiration PNA, on IV vanco and meropenem, with course c/b continued fevers, and bacteremia with S. hominis. Also failed FEES, has NGT in place and will likely need PEG. Is requiring HFNC alternating with BiPAP. Is also on midodrine for hypotension. Patient is full code. Of note patient is under [place]. Palliative care consulted for [facility].    INTERVAL EVENTS  2/6/24: patient appears comfortable  2/8/24: patient appears comfortable, on HFNC and pressors  2/9/24: patient comfrotable, resting, on HFNC, off pressors    ADVANCE DIRECTIVES:    [X ] Full Code [ ] DNR  MOLST  [ ]  Living Will  [ ]   DECISION MAKER(s):  [ ] Health Care Proxy(s)  [ ] Surrogate(s)  [ ] Guardian           Name(s): Phone Number(s): [facility]    BASELINE (I)ADL(s) (prior to admission):  Globe: [ ]Total  [ ] Moderate [ ]Dependent  Palliative Performance Status Version 2:         %    http://npcrc.org/files/news/palliative_performance_scale_ppsv2.pdf    Allergies    No Known Allergies    Intolerances    MEDICATIONS  (STANDING):  albuterol/ipratropium for Nebulization 3 milliLiter(s) Nebulizer <User Schedule>  artificial  tears Solution 1 Drop(s) Both EYES two times a day  calcium carbonate   1250 mG (OsCal) 1 Tablet(s) Oral two times a day  caspofungin IVPB      caspofungin IVPB 50 milliGRAM(s) IV Intermittent every 24 hours  chlorhexidine 2% Cloths 1 Application(s) Topical daily  enoxaparin Injectable 50 milliGRAM(s) SubCutaneous every 12 hours  gabapentin 300 milliGRAM(s) Oral every 12 hours  levETIRAcetam  Solution 500 milliGRAM(s) Oral every 12 hours  melatonin 3 milliGRAM(s) Oral at bedtime  meropenem  IVPB 1000 milliGRAM(s) IV Intermittent every 8 hours  metoprolol tartrate 12.5 milliGRAM(s) Oral two times a day  midodrine. 20 milliGRAM(s) Oral three times a day  norepinephrine Infusion 0.05 MICROgram(s)/kG/Min (4.9 mL/Hr) IV Continuous <Continuous>  pantoprazole  Injectable 40 milliGRAM(s) IV Push every 24 hours  polyethylene glycol 3350 17 Gram(s) Oral at bedtime  raloxifene 60 milliGRAM(s) Oral daily  scopolamine 1 mG/72 Hr(s) Patch 1 Patch Transdermal every 72 hours  senna 2 Tablet(s) Oral at bedtime  silver sulfADIAZINE 1% Cream 1 Application(s) Topical two times a day  sodium chloride 0.65% Nasal 2 Spray(s) Both Nostrils three times a day    MEDICATIONS  (PRN):  acetaminophen     Tablet .. 650 milliGRAM(s) Oral once PRN Temp greater or equal to 38.5C (101.3F)  acetaminophen     Tablet .. 650 milliGRAM(s) Oral every 6 hours PRN Temp greater or equal to 38C (100.4F), Mild Pain (1 - 3)  aluminum hydroxide/magnesium hydroxide/simethicone Suspension 30 milliLiter(s) Oral every 4 hours PRN Dyspepsia  ondansetron Injectable 4 milliGRAM(s) IV Push every 8 hours PRN Nausea and/or Vomiting    PRESENT SYMPTOMS: [X ]Unable to obtain due to poor mentation   Source if other than patient:  [ ]Family   [ ]Team     Pain: [ ]yes [ ]no  QOL impact -   Location -                    Aggravating factors -  Quality -  Radiation -  Timing-  Severity (0-10 scale):  Minimal acceptable level (0-10 scale):     CPOT:    https://www.[facility].org/getattachment/[email or web address]/Critical-Care-Pain-Observation-Tool-(CPOT)    PAIN AD Score: 0  http://geriatrictoolkit.[place].[email or web address]/painad.pdf (press ctrl +  left click to view)    Dyspnea:                           [ ]None[ ]Mild [ ]Moderate [ ]Severe     Respiratory Distress Observation Scale (RDOS): 0  A score of 0 to 2 signifies little or no respiratory distress, 3 signifies mild distress, scores 4 to 6 indicate moderate distress, and scores greater than 7 signify severe distress  [email or web address]-wvwkfiyfwpk-[email or web address]    Anxiety:                             [ ]None[ ]Mild [ ]Moderate [ ]Severe   Fatigue:                             [ ]None[ ]Mild [ ]Moderate [ ]Severe   Nausea:                             [ ]None[ ]Mild [ ]Moderate [ ]Severe   Loss of appetite:              [ ]None[ ]Mild [ ]Moderate [ ]Severe   Constipation:                    [ ]None[ ]Mild [ ]Moderate [ ]Severe    Other Symptoms:  [ ]All other review of systems negative     Palliative Performance Status Version 2:         %    http://npcrc.org/files/news/palliative_performance_scale_ppsv2.pdf  PHYSICAL EXAM:  Vital Signs Last 24 Hrs  T(C): 37.3 (09 Feb 2024 12:00), Max: 37.9 (09 Feb 2024 04:00)  T(F): 99.2 (09 Feb 2024 12:00), Max: 100.3 (09 Feb 2024 04:00)  HR: 64 (09 Feb 2024 12:00) (57 - 82)  BP: 84/48 (09 Feb 2024 12:00) (74/37 - 146/63)  BP(mean): 61 (09 Feb 2024 12:00) (49 - 90)  RR: 19 (09 Feb 2024 12:00) (18 - 21)  SpO2: 96% (09 Feb 2024 15:40) (94% - 97%)    Parameters below as of 09 Feb 2024 15:40  Patient On (Oxygen Delivery Method): nasal cannula, high flow  O2 Flow (L/min): 60  O2 Concentration (%): 70      GENERAL:  [X ] No acute distress [ ]Lethargic  [ ]Unarousable  [ ]Verbal  [ ]Non-Verbal [ ]Cachexia    BEHAVIORAL/PSYCH:  [ ]Alert and Oriented x  [ ] Anxiety [ ] Delirium [ ] Agitation [X ] Calm   EYES: [ X] No scleral icterus [ ] Scleral icterus [ ] Closed  ENMT:  [ ]Dry mouth  [ ]No external oral lesions [ X] No external ear or nose lesions  CARDIOVASCULAR:  [ ]Regular [ ]Irregular [ ]Tachy [ ]Not Tachy  [ ]Raheem [ ] Edema [ ] No edema  PULMONARY:  [ ]Tachypnea  [ ]Audible excessive secretions [X ] No labored breathing [ ] labored breathing  GASTROINTESTINAL: [ ]Soft  [ ]Distended  [ X]Not distended [ ]Non tender [ ]Tender  MUSCULOSKELETAL: [ ]No clubbing [ ] clubbing  [ X] No cyanosis [ ] cyanosis  NEUROLOGIC: [ ]No focal deficits  [ ]Follows commands  [ ]Does not follow commands  [X ]Cognitive impairment  [ ]Dysphagia  [ ]Dysarthria  [ ]Paresis   SKIN: [ ] Jaundiced [X ] Non-jaundiced [ ]Rash [ ]No Rash [ ] Warm [ ] Dry  MISC/LINES: [ ] ET tube [ ] Trach [ ]NGT/OGT [ ]PEG [ ]Madsen    CRITICAL CARE:  [ ] Shock Present  [ ]Septic [ ]Cardiogenic [ ]Neurologic [ ]Hypovolemic  [ ]  Vasopressors [ ]  Inotropes   [ ]Respiratory failure present [ ]Mechanical ventilation [ ]Non-invasive ventilatory support [ ]High flow  [ ]Acute  [ ]Chronic [ ]Hypoxic  [ ]Hypercarbic [ ]Other  [ ]Other organ failure     LABS: reviewed by me                          9.0    9.61  )-----------( 423      ( 09 Feb 2024 06:08 )             30.1     02-09    142  |  100  |  13  ----------------------------<  128<H>  4.4   |  32  |  0.5<L>    Ca    8.3<L>      09 Feb 2024 06:08  Mg     2.2     02-09          RADIOLOGY & ADDITIONAL STUDIES: reviewed by m    CXR 2/5/24    Support devices: Enteric tube satisfactory position.    Cardiac/ Mediastinum: unremarkable    Lungs/ Pleura: Diminishing left lung opacity/effusion. Stable smaller   right basilar opacity. No pneumothorax.    Skeletal/ soft tissues: Stable    PROTEIN CALORIE MALNUTRITION PRESENT: [ ]mild [ ]moderate [ ]severe [ ]underweight [ ]morbid obesity  https://www.andeal.org/vault/2440/web/files/ONC/Table_Clinical%20Characteristics%20to%20Document%20Malnutrition-White%20JV%20et%20al%202012.pdf    Height (cm): 136.4 (01-24-24 @ 09:52), 154.9 (11-17-23 @ 15:00), 145 (10-02-23 @ 12:00)  Weight (kg): 52.3 (01-21-24 @ 08:00), 60 (11-17-23 @ 15:00), 55.3 (10-02-23 @ 12:00)  BMI (kg/m2): 28.1 (01-24-24 @ 09:52), 21.8 (01-21-24 @ 08:00), 25 (11-17-23 @ 15:00)    [ ]PPSV2 < or = to 30% [ ]significant weight loss  [ ]poor nutritional intake  [ ]anasarca      [ ]Artificial Nutrition          Palliative Care Spiritual/Emotional Screening Tool Question  Severity (0-4):                    OR                    [X ] Unable to determine/NA  Score of 2 or greater indicates recommendation of Chaplaincy referral  Chaplaincy Referral: [ ] Yes [ ] Refused [ ] Following     Caregiver [place]ee[place]:  [ ] Yes [ ] No    OR    [x ] Unable to determine. Will assess at later time if appropriate.  Social Work Referral [ ]  Patient and Family Centered Care Referral [ ]    Anticipatory Grief Present: [ ] Yes [ ] No    OR     [ x] Unable to determine. Will assess at later time if appropriate.  Social Work Referral [ ]  Patient and Family Centered Care Referral [ ]    REFERRALS:   [ ]Chaplaincy  [ ]Hospice  [ ]Child Life  [ ]Social Work  [ ]Case management [ ]Holistic Therapy     Palliative care education provided to patient and/or family    Goals of Care Document:     ______________  Wu Jenkins MD  Palliative Medicine  [facility]   of Geriatric and Palliative Medicine  [phone]

## 2024-02-09 NOTE — PROGRESS NOTE ADULT - SUBJECTIVE AND OBJECTIVE BOX
Patient is a 58y old  Female who presents with a chief complaint of Respiratory Distress (08 Feb 2024 15:09)        Over Night Events: Remains critically ill on HFNC 60/90.  On Levophed     ROS:     All ROS are negative except HPI       PHYSICAL EXAM    ICU Vital Signs Last 24 Hrs  T(C): 37.9 (09 Feb 2024 04:00), Max: 37.9 (09 Feb 2024 04:00)  T(F): 100.3 (09 Feb 2024 04:00), Max: 100.3 (09 Feb 2024 04:00)  HR: 82 (09 Feb 2024 04:00) (52 - 82)  BP: 95/57 (09 Feb 2024 04:00) (74/37 - 146/63)  BP(mean): 70 (09 Feb 2024 04:00) (49 - 90)  ABP: --  ABP(mean): --  RR: 18 (09 Feb 2024 04:00) (18 - 20)  SpO2: 95% (09 Feb 2024 04:00) (94% - 97%)    O2 Parameters below as of 09 Feb 2024 04:00  Patient On (Oxygen Delivery Method): nasal cannula, high flow  O2 Flow (L/min): 60  O2 Concentration (%): 90    CONSTITUTIONAL:  Moderate respiratory distress    CARDIAC:   tachycardic   Regular     RESPIRATORY:   No wheezing  BL crackles    GASTROINTESTINAL:  Abdomen soft,   Non-tender,   No guarding,   + BS    MUSCULOSKELETAL:   Range of motion is not limited,  No clubbing, cyanosis    NEUROLOGICAL:   Alert     SKIN:   Skin normal color for race,   No evidence of rash.          02-08-24 @ 07:01  -  02-09-24 @ 07:00  --------------------------------------------------------  IN:    Enteral Tube Flush: 450 mL    Jevity 1.2: 900 mL    Norepinephrine: 146.9 mL  Total IN: 1496.9 mL    OUT:    Voided (mL): 150 mL  Total OUT: 150 mL    Total NET: 1346.9 mL          LABS:                            9.0    9.61  )-----------( 423      ( 09 Feb 2024 06:08 )             30.1                                               02-09    142  |  100  |  13  ----------------------------<  128<H>  4.4   |  32  |  0.5<L>    Ca    8.3<L>      09 Feb 2024 06:08  Mg     2.2     02-09    TPro  5.9<L>  /  Alb  2.5<L>  /  TBili  <0.2  /  DBili  x   /  AST  24  /  ALT  26  /  AlkPhos  87  02-09                                             Urinalysis Basic - ( 09 Feb 2024 06:08 )    Color: x / Appearance: x / SG: x / pH: x  Gluc: 128 mg/dL / Ketone: x  / Bili: x / Urobili: x   Blood: x / Protein: x / Nitrite: x   Leuk Esterase: x / RBC: x / WBC x   Sq Epi: x / Non Sq Epi: x / Bacteria: x                                                  LIVER FUNCTIONS - ( 09 Feb 2024 06:08 )  Alb: 2.5 g/dL / Pro: 5.9 g/dL / ALK PHOS: 87 U/L / ALT: 26 U/L / AST: 24 U/L / GGT: x                                                                                                                                       MEDICATIONS  (STANDING):  albuterol/ipratropium for Nebulization 3 milliLiter(s) Nebulizer <User Schedule>  artificial  tears Solution 1 Drop(s) Both EYES two times a day  calcium carbonate   1250 mG (OsCal) 1 Tablet(s) Oral two times a day  caspofungin IVPB      caspofungin IVPB 50 milliGRAM(s) IV Intermittent every 24 hours  chlorhexidine 2% Cloths 1 Application(s) Topical daily  enoxaparin Injectable 50 milliGRAM(s) SubCutaneous every 12 hours  gabapentin 300 milliGRAM(s) Oral every 12 hours  levETIRAcetam  Solution 500 milliGRAM(s) Oral every 12 hours  melatonin 3 milliGRAM(s) Oral at bedtime  meropenem  IVPB 1000 milliGRAM(s) IV Intermittent every 8 hours  metoprolol tartrate 12.5 milliGRAM(s) Oral two times a day  midodrine. 20 milliGRAM(s) Oral three times a day  norepinephrine Infusion 0.05 MICROgram(s)/kG/Min (4.9 mL/Hr) IV Continuous <Continuous>  pantoprazole  Injectable 40 milliGRAM(s) IV Push every 24 hours  polyethylene glycol 3350 17 Gram(s) Oral at bedtime  raloxifene 60 milliGRAM(s) Oral daily  scopolamine 1 mG/72 Hr(s) Patch 1 Patch Transdermal every 72 hours  senna 2 Tablet(s) Oral at bedtime  silver sulfADIAZINE 1% Cream 1 Application(s) Topical two times a day    MEDICATIONS  (PRN):  acetaminophen     Tablet .. 650 milliGRAM(s) Oral once PRN Temp greater or equal to 38.5C (101.3F)  acetaminophen     Tablet .. 650 milliGRAM(s) Oral every 6 hours PRN Temp greater or equal to 38C (100.4F), Mild Pain (1 - 3)  aluminum hydroxide/magnesium hydroxide/simethicone Suspension 30 milliLiter(s) Oral every 4 hours PRN Dyspepsia  ondansetron Injectable 4 milliGRAM(s) IV Push every 8 hours PRN Nausea and/or Vomiting  sodium chloride 0.65% Nasal 1 Spray(s) Both Nostrils daily PRN Nasal Congestion      New X-rays reviewed:                                                                                  ECHO

## 2024-02-09 NOTE — PROGRESS NOTE ADULT - ASSESSMENT
58F with Down syndrome, nonverbal at baseline, hypothyroidism, CP, and seizure disorder here from [facility] with fever and respiratory distress. Found to be septic on admission, from CAP/aspiration PNA, on IV vanco and meropenem, with course c/b continued fevers, and bacteremia with S. hominis. Also failed FEES, has NGT in place and will likely need PEG. Is requiring HFNC alternating with BiPAP. Is also on midodrine for hypotension. Patient is full code. Of note patient is under [place] [facility]. Palliative care consulted for [facility].

## 2024-02-09 NOTE — PROGRESS NOTE ADULT - SUBJECTIVE AND OBJECTIVE BOX
TEA ECHAVARRIA  58y, Female  Allergy: No Known Allergies      LOS  20d    CHIEF COMPLAINT: Respiratory Distress (09 Feb 2024 14:48)      INTERVAL EVENTS/HPI  - T(F): , Max: 100.3 (02-09-24 @ 04:00)  - WBC Count: 9.61 (02-09-24 @ 06:08)  WBC Count: 8.53 (02-08-24 @ 05:39)     - Creatinine: 0.5 (02-09-24 @ 06:08)  Creatinine: <0.5 (02-08-24 @ 05:39)     -   -   -     ROS  cannot obtain secondary to patient's sedation and/or mental status    VITALS:  T(F): 99.2, Max: 100.3 (02-09-24 @ 04:00)  HR: 64  BP: 84/48  RR: 19Vital Signs Last 24 Hrs  T(C): 37.3 (09 Feb 2024 12:00), Max: 37.9 (09 Feb 2024 04:00)  T(F): 99.2 (09 Feb 2024 12:00), Max: 100.3 (09 Feb 2024 04:00)  HR: 64 (09 Feb 2024 12:00) (52 - 82)  BP: 84/48 (09 Feb 2024 12:00) (74/37 - 146/63)  BP(mean): 61 (09 Feb 2024 12:00) (49 - 90)  RR: 19 (09 Feb 2024 12:00) (18 - 21)  SpO2: 97% (09 Feb 2024 12:00) (94% - 97%)    Parameters below as of 09 Feb 2024 07:35  Patient On (Oxygen Delivery Method): nasal cannula, high flow  O2 Flow (L/min): 60  O2 Concentration (%): 90    PHYSICAL EXAM:  Gen: chronically ill appearing HFNC  HEENT: Normocephalic, atraumatic  Neck: supple, no lymphadenopathy  CV: Regular rate & regular rhythm  Lungs: decreased BS at bases, no fremitus  Abdomen: Soft, BS present  Ext: Warm, well perfused  Neuro: non focal, not following commands  Skin: no rash, no erythema  Lines: no phlebitis     FH: Non-contributory  Social Hx: Non-contributory    TESTS & MEASUREMENTS:                        9.0    9.61  )-----------( 423      ( 09 Feb 2024 06:08 )             30.1     02-09    142  |  100  |  13  ----------------------------<  128<H>  4.4   |  32  |  0.5<L>    Ca    8.3<L>      09 Feb 2024 06:08  Mg     2.2     02-09    TPro  5.9<L>  /  Alb  2.5<L>  /  TBili  <0.2  /  DBili  x   /  AST  24  /  ALT  26  /  AlkPhos  87  02-09      LIVER FUNCTIONS - ( 09 Feb 2024 06:08 )  Alb: 2.5 g/dL / Pro: 5.9 g/dL / ALK PHOS: 87 U/L / ALT: 26 U/L / AST: 24 U/L / GGT: x           Urinalysis Basic - ( 09 Feb 2024 06:08 )    Color: x / Appearance: x / SG: x / pH: x  Gluc: 128 mg/dL / Ketone: x  / Bili: x / Urobili: x   Blood: x / Protein: x / Nitrite: x   Leuk Esterase: x / RBC: x / WBC x   Sq Epi: x / Non Sq Epi: x / Bacteria: x        Culture - Blood (collected 02-03-24 @ 11:13)  Source: .Blood None  Final Report (02-08-24 @ 20:00):    No growth at 5 days    Culture - Blood (collected 02-01-24 @ 11:58)  Source: .Blood None  Final Report (02-06-24 @ 23:06):    No growth at 5 days    Culture - Urine (collected 02-01-24 @ 11:40)  Source: Clean Catch Clean Catch (Midstream)  Final Report (02-03-24 @ 00:06):    >=3 organisms. Probable collection contamination.    Culture - Blood (collected 01-31-24 @ 18:21)  Source: .Blood None  Final Report (02-06-24 @ 01:01):    No growth at 5 days    Culture - Blood (collected 01-31-24 @ 18:21)  Source: .Blood None  Final Report (02-06-24 @ 01:01):    No growth at 5 days    Culture - Urine (collected 01-23-24 @ 05:20)  Source: Clean Catch Clean Catch (Midstream)  Final Report (01-25-24 @ 00:26):    No growth    Culture - Blood (collected 01-22-24 @ 16:51)  Source: .Blood None  Final Report (01-28-24 @ 01:00):    No growth at 5 days    Culture - Urine (collected 01-21-24 @ 05:00)  Source: Clean Catch Clean Catch (Midstream)  Final Report (01-22-24 @ 07:15):    No growth    Culture - Blood (collected 01-20-24 @ 16:15)  Source: .Blood Blood-Peripheral  Final Report (01-25-24 @ 23:00):    No growth at 5 days    Culture - Blood (collected 01-20-24 @ 16:15)  Source: .Blood Blood-Peripheral  Gram Stain (01-21-24 @ 20:33):    Growth in aerobic bottle: Gram positive cocci in pairs  Final Report (01-22-24 @ 19:04):    Growth in aerobic bottle: Staphylococcus hominis    Isolation of Coagulase negative Staphylococcus from single blood culture    sets may represent    contamination. Contact the Microbiology Department at [phone] if    susceptibility testing is    clinically indicated.    Direct identification is available within approximately 3-5    hours either by Blood Panel Multiplexed PCR or Direct    MALDI-TOF. Details: [email or web address]  Organism: Blood Culture PCR (01-22-24 @ 19:04)  Organism: Blood Culture PCR (01-22-24 @ 19:04)      -  Coagulase negative Staphylococcus: Detec      Method Type: PCR            INFECTIOUS DISEASES TESTING  Fungitell: 63 (02-06-24 @ 11:27)  Rapid RVP Result: NotDetec (02-04-24 @ 10:28)  MRSA PCR Result.: Negative (02-03-24 @ 21:32)  Procalcitonin, Serum: 0.15 (02-03-24 @ 11:13)  Procalcitonin, Serum: 0.22 (02-01-24 @ 16:00)  MRSA PCR Result.: Negative (01-22-24 @ 05:30)  Legionella Antigen, Urine: Negative (01-21-24 @ 05:00)  Rapid RVP Result: Detected (01-21-24 @ 00:58)  Procalcitonin, Serum: 0.51 (01-20-24 @ 21:23)  Fungitell: 325 (01-20-24 @ 21:23)  Fungitell: 138 (11-07-23 @ 08:08)  Fungitell: 115 (11-02-23 @ 11:40)  Procalcitonin, Serum: 0.04 (11-02-23 @ 11:40)  Procalcitonin, Serum: 0.26 (10-24-23 @ 11:00)  MRSA PCR Result.: Negative (10-17-23 @ 17:20)  Fungitell: >500 (10-17-23 @ 17:20)  Procalcitonin, Serum: 4.36 (10-17-23 @ 17:20)  Procalcitonin, Serum: 4.18 (10-17-23 @ 12:35)  Procalcitonin, Serum: 0.07 (10-15-23 @ 07:28)  COVID-19 PCR: NotDetec (10-12-23 @ 09:14)  Procalcitonin, Serum: 0.40 (10-07-23 @ 10:54)  Legionella Antigen, Urine: Negative (09-30-23 @ 14:36)  MRSA PCR Result.: Negative (09-29-23 @ 16:50)  Procalcitonin, Serum: 9.78 (08-12-23 @ 11:25)  MRSA PCR Result.: Negative (08-12-23 @ 06:10)  Rapid RVP Result: NotDetec (08-12-23 @ 01:33)  Procalcitonin, Serum: 0.63 (08-10-23 @ 08:46)  Procalcitonin, Serum: 7.82 (08-04-23 @ 16:13)  MRSA PCR Result.: Negative (08-04-23 @ 14:52)  Rapid RVP Result: NotDetec (08-04-23 @ 03:00)      INFLAMMATORY MARKERS  Sedimentation Rate, Erythrocyte: 100 mm/Hr (02-03-24 @ 11:13)  C-Reactive Protein, Serum: 214.2 mg/L (02-03-24 @ 11:13)  C-Reactive Protein, Serum: 132.3 mg/L (02-01-24 @ 16:00)  Sedimentation Rate, Erythrocyte: 67 mm/Hr (10-15-23 @ 07:28)      RADIOLOGY & ADDITIONAL TESTS:  I have personally reviewed the last available Chest xray  CXR      CT      CARDIOLOGY TESTING  12 Lead ECG:   Ventricular Rate 118 BPM    Atrial Rate 118 BPM    P-R Interval 122 ms    QRS Duration 64 ms    Q-T Interval 328 ms    QTC Calculation(Bazett) 459 ms    P Axis 54 degrees    R Axis 93 degrees    T Axis 58 degrees    Diagnosis Line Sinus tachycardia  Rightward axis  Low voltage QRS  Borderline ECG    Confirmed by MARJAN MENDES MD ([id number]) on 2/2/2024 7:15:17 AM (02-01-24 @ 14:30)  12 Lead ECG:   Ventricular Rate 88 BPM    Atrial Rate 88 BPM    P-R Interval 112 ms    QRS Duration 64 ms    Q-T Interval 362 ms    QTC Calculation(Bazett) 438 ms    P Axis -11 degrees    R Axis 95 degrees    T Axis 59 degrees    Diagnosis Line Normal sinus rhythm  Rightward axis  Borderline ECG    Confirmed by caleb roberts ([id number]) on 1/31/2024 2:01:26 PM (01-31-24 @ 11:11)      MEDICATIONS  albuterol/ipratropium for Nebulization 3  artificial  tears Solution 1  calcium carbonate   1250 mG (OsCal) 1  caspofungin IVPB   caspofungin IVPB 50  chlorhexidine 2% Cloths 1  enoxaparin Injectable 50  gabapentin 300  levETIRAcetam  Solution 500  melatonin 3  meropenem  IVPB 1000  metoprolol tartrate 12.5  midodrine. 20  norepinephrine Infusion 0.05  pantoprazole  Injectable 40  polyethylene glycol 3350 17  raloxifene 60  scopolamine 1 mG/72 Hr(s) Patch 1  senna 2  silver sulfADIAZINE 1% Cream 1  sodium chloride 0.65% Nasal 2      WEIGHT  Weight (kg): 52.3 (01-21-24 @ 08:00)  Creatinine: 0.5 mg/dL (02-09-24 @ 06:08)      ANTIBIOTICS:  caspofungin IVPB 50 milliGRAM(s) IV Intermittent every 24 hours  caspofungin IVPB      meropenem  IVPB 1000 milliGRAM(s) IV Intermittent every 8 hours      All available historical records have been reviewed

## 2024-02-09 NOTE — PROGRESS NOTE ADULT - SUBJECTIVE AND OBJECTIVE BOX
Interval History  Patient is a 58y old Female who presents with a chief complaint of Respiratory Distress (09 Feb 2024 08:13)    TEA ECHAVARRIA is admitted with a primary diagnosis of Sepsis with acute hypoxic respiratory failure      Today is hospital day 20d. The patient was examined at the bedside this morning. No acute overnight events were reported.    Admission HPI  HPI:  58F w/ PMHx Down Syndrome, nonverbal at baseline, Hypothyroidism, Cerebral palsy and Seizure Disorders presents to the ED from nursing facility/group home ([facility]) presented to ED for respiratory distress and high grade fever. Patient found to be septic on admission. As per aide Janette at bedside, patient had been coughing and congested for 3x days. Denies fevers, chills, n/v/d or pain prior to admission. Patient is on a puree diet at baseline.    Vitals: Temp 104.5F (rectal), /74, , RR 24, SpO2 100% on BiPAP    Labs: Hgb 9.5 (previously 11.1), Platelet 422, INR 1.43, VBG Lactate 2.1    Imaging: CXR shows possible RML infiltrate    In the ED:  - s/p Cefepime 2g IV x1  - s/p Levaquin 750mg IV x1  - s/p 2L LR bolus    Admitted to SDU for management of acute respiratory distress 2/2 CAP/Aspiration PNA (20 Jan 2024 18:22)      Past Medical and Surgical History  Down syndrome    Osteoporosis    Mild anemia    Neuropathy    S/P debridement  of R hip on 3/2/21      Family History  FAMILY HISTORY:    Social History  Social History:      Allergies  No Known Allergies    Medications  Standing Medications  albuterol/ipratropium for Nebulization 3 milliLiter(s) Nebulizer <User Schedule>  artificial  tears Solution 1 Drop(s) Both EYES two times a day  calcium carbonate   1250 mG (OsCal) 1 Tablet(s) Oral two times a day  caspofungin IVPB      caspofungin IVPB 50 milliGRAM(s) IV Intermittent every 24 hours  chlorhexidine 2% Cloths 1 Application(s) Topical daily  enoxaparin Injectable 50 milliGRAM(s) SubCutaneous every 12 hours  gabapentin 300 milliGRAM(s) Oral every 12 hours  levETIRAcetam  Solution 500 milliGRAM(s) Oral every 12 hours  melatonin 3 milliGRAM(s) Oral at bedtime  meropenem  IVPB 1000 milliGRAM(s) IV Intermittent every 8 hours  metoprolol tartrate 12.5 milliGRAM(s) Oral two times a day  midodrine. 20 milliGRAM(s) Oral three times a day  norepinephrine Infusion 0.05 MICROgram(s)/kG/Min IV Continuous <Continuous>  pantoprazole  Injectable 40 milliGRAM(s) IV Push every 24 hours  polyethylene glycol 3350 17 Gram(s) Oral at bedtime  raloxifene 60 milliGRAM(s) Oral daily  scopolamine 1 mG/72 Hr(s) Patch 1 Patch Transdermal every 72 hours  senna 2 Tablet(s) Oral at bedtime  silver sulfADIAZINE 1% Cream 1 Application(s) Topical two times a day  sodium chloride 0.65% Nasal 2 Spray(s) Both Nostrils three times a day    PRN Medications  acetaminophen     Tablet .. 650 milliGRAM(s) Oral once PRN  acetaminophen     Tablet .. 650 milliGRAM(s) Oral every 6 hours PRN  aluminum hydroxide/magnesium hydroxide/simethicone Suspension 30 milliLiter(s) Oral every 4 hours PRN  ondansetron Injectable 4 milliGRAM(s) IV Push every 8 hours PRN    Vitals  T(F): 99  HR: 71  BP: 103/52  RR: 18  SpO2: 97%    I&O's    02-08-24 @ 07:01  -  02-09-24 @ 07:00  --------------------------------------------------------  IN: 1496.9 mL / OUT: 150 mL / NET: 1346.9 mL        Physical Examination  General: Appears comfortable, Down Syndrome  Head: NGT  Cardiac: Regular rate and rhythm  Pulmonary: Crackles L base   Abdominal: Soft, nontender, and nondistended  Extremities: No pitting edema  Neurologic: Alert to name, nonverbal at baseline    Labs                        9.0    9.61  )-----------( 423      ( 09 Feb 2024 06:08 )             30.1     02-09    142  |  100  |  13  ----------------------------<  128<H>  4.4   |  32  |  0.5<L>    Ca    8.3<L>      09 Feb 2024 06:08  Mg     2.2     02-09    TPro  5.9<L>  /  Alb  2.5<L>  /  TBili  <0.2  /  DBili  x   /  AST  24  /  ALT  26  /  AlkPhos  87  02-09      Urinalysis Basic - ( 09 Feb 2024 06:08 )    Color: x / Appearance: x / SG: x / pH: x  Gluc: 128 mg/dL / Ketone: x  / Bili: x / Urobili: x   Blood: x / Protein: x / Nitrite: x   Leuk Esterase: x / RBC: x / WBC x   Sq Epi: x / Non Sq Epi: x / Bacteria: x      CAPILLARY BLOOD GLUCOSE            Imaging  CXR  Xray Chest 1 View- PORTABLE-Urgent:   ACC: [id number] EXAM:  XR CHEST PORTABLE URGENT 1V   ORDERED BY: SEVERINO PETERS     PROCEDURE DATE:  02/06/2024          INTERPRETATION:  Clinical History / Reason for exam: Enteric tube   placement    Comparison : Chest radiograph February 6, 2024.    Technique/Positioning: Single frontal chest x-ray obtained.    Findings:    Support devices: Enteric tube extends below left hemidiaphragm    Cardiac/mediastinum/hilum: Unchanged.    Lung parenchyma/Pleura: Unchanged bilateral opacities.    Skeleton/soft tissues: Unchanged.    Impression:    Unchanged bilateral opacities.  Enteric tube extends below left hemidiaphragm.    --- End of Report ---            SHYANN GREENE MD; Attending Radiologist  This document has been electronically signed. Feb 62024  2:33PM (02-06-24 @ 13:23)      CT

## 2024-02-09 NOTE — PROGRESS NOTE ADULT - SUBJECTIVE AND OBJECTIVE BOX
TEA ECHAVARRIA  58y Female    CHIEF COMPLAINT:    Patient is a 58y old  Female who presents with a chief complaint of Respiratory Distress (08 Feb 2024 07:52)    OVER NIGHT EVENTS:  Patient seen and examined. No acute events overnight. Overall unchanged, remains on HFNC  / ON Levophed drip and Midodrine      Vital Signs:  T(C): 37.3 (09 Feb 2024 12:00), Max: 37.9 (09 Feb 2024 04:00)  T(F): 99.2 (09 Feb 2024 12:00), Max: 100.3 (09 Feb 2024 04:00)  HR: 64 (09 Feb 2024 12:00) (52 - 82)  BP: 84/48 (09 Feb 2024 12:00) (74/37 - 146/63)  BP(mean): 61 (09 Feb 2024 12:00) (49 - 90)  RR: 19 (09 Feb 2024 12:00) (18 - 21)  SpO2: 97% (09 Feb 2024 12:00) (94% - 97%)    Parameters below as of 09 Feb 2024 07:35  Patient On (Oxygen Delivery Method): nasal cannula, high flow  O2 Flow (L/min): 60  O2 Concentration (%): 90    PHYSICAL EXAM:    GENERAL:  NAD chronically ill appearing   SKIN: No rashes or lesions  HEENT: Atraumatic. Normocephalic.    NECK: Supple, No JVD.    PULMONARY: decreased breath sounds B/L. No wheezing.   CVS: Normal S1, S2. Rate and Rhythm are regular   ABDOMEN/GI: Soft, Nontender, mildly distended   MSK:  No clubbing or cyanosis   NEUROLOGIC: opens eyes to tactile stimuli, does not follow commands   PSYCH: lethargic     Consultant(s) Notes Reviewed:  [x ] YES      Care Discussed with Consultants/Other Providers [ x] YES      LABS:                        9.0    9.61  )-----------( 423      ( 09 Feb 2024 06:08 )             30.1     02-09    142  |  100  |  13  ----------------------------<  128<H>  4.4   |  32  |  0.5<L>    Ca    8.3<L>      09 Feb 2024 06:08    Mg     2.2     02-09    TPro  5.9<L>  /  Alb  2.5<L>  /  TBili  <0.2  /  DBili  x   /  AST  24  /  ALT  26  /  AlkPhos  87  02-09                        8.3    9.76  )-----------( 332      ( 07 Feb 2024 05:36 )             27.5     147<H>  |  109  |  13  ----------------------------<  119<H>  5.4<H>   |  31  |  0.5<L>    Ca    8.2<L>      07 Feb 2024 16:45    Mg     2.1     02-07    TPro  5.3<L>  /  Alb  2.4<L>  /  TBili  <0.2  /  DBili  x   /  AST  18  /  ALT  15  /  AlkPhos  80  02-07    RADIOLOGY & ADDITIONAL TESTS:    Imaging or report Personally Reviewed:  [x] YES     EKG reviewed: [x] YES     MEDICATIONS  (STANDING):  albuterol/ipratropium for Nebulization 3 milliLiter(s) Nebulizer <User Schedule>  artificial  tears Solution 1 Drop(s) Both EYES two times a day  calcium carbonate   1250 mG (OsCal) 1 Tablet(s) Oral two times a day  caspofungin IVPB 50 milliGRAM(s) IV Intermittent every 24 hours     chlorhexidine 2% Cloths 1 Application(s) Topical daily  enoxaparin Injectable 50 milliGRAM(s) SubCutaneous every 12 hours  gabapentin 300 milliGRAM(s) Oral every 12 hours  levETIRAcetam  Solution 500 milliGRAM(s) Oral every 12 hours  melatonin 3 milliGRAM(s) Oral at bedtime  meropenem  IVPB 1000 milliGRAM(s) IV Intermittent every 8 hours  metoprolol tartrate 12.5 milliGRAM(s) Oral two times a day  midodrine. 20 milliGRAM(s) Oral three times a day  norepinephrine Infusion 0.05 MICROgram(s)/kG/Min (4.9 mL/Hr) IV Continuous <Continuous>  pantoprazole  Injectable 40 milliGRAM(s) IV Push every 24 hours  polyethylene glycol 3350 17 Gram(s) Oral at bedtime  raloxifene 60 milliGRAM(s) Oral daily  scopolamine 1 mG/72 Hr(s) Patch 1 Patch Transdermal every 72 hours  senna 2 Tablet(s) Oral at bedtime  silver sulfADIAZINE 1% Cream 1 Application(s) Topical two times a day  sodium chloride 0.65% Nasal 2 Spray(s) Both Nostrils three times a day    MEDICATIONS  (PRN):  acetaminophen     Tablet .. 650 milliGRAM(s) Oral once PRN Temp greater or equal to 38.5C (101.3F)  acetaminophen     Tablet .. 650 milliGRAM(s) Oral every 6 hours PRN Temp greater or equal to 38C (100.4F), Mild Pain (1 - 3)  aluminum hydroxide/magnesium hydroxide/simethicone Suspension 30 milliLiter(s) Oral every 4 hours PRN Dyspepsia  ondansetron Injectable 4 milliGRAM(s) IV Push every 8 hours PRN Nausea and/or Vomiting

## 2024-02-09 NOTE — PROGRESS NOTE ADULT - ASSESSMENT
58F w/ PMHx Down Syndrome, nonverbal at baseline, Hypothyroidism, Cerebral palsy and Seizure Disorders presents to the ED from nursing facility/group home ([facility]) presented to ED for respiratory distress and high grade fever. Patient found to be septic on admission.Admitted to SDU for management of acute respiratory distress 2/2 CAP/Aspiration PNA (20 Jan 2024 18:22)  While pt was on the floor she developed dyspnea after swallow evaluation, was spiking high grade fever, consulted by pulmonary/critical care and was upgraded back to SDU.    Sepsis POA / Acute hypoxic resp failure / RSV bronchiolitis /  H/o Dysphagia/ suspected aspiration  - inflammatory markers elevated, RVP is negative, MRSA neg  - BCx (1/20): Staph hominis -  contaminant repeat BCx have been NGTD  - Failed FEES, s/p  NG tube for feedings and medications, patient will need PEG tube once improved. Recall GI once off HFNC   - Aspiration precautions, Chest PT vest , c/w duoneb  - ID is following, recommendations noted:   - c/w  Caspo 50mg q24h IV   - Repeat fungitell pending - no source  - c/w meropenem 1g q8h IV  - f/u  histoplasma Ab serum   - c/w HFNC alternate with BIPAP. Keep spo2 more than 92%   - remains on levophed  and midodrine 20 Q8H     Hypernatremia - worsening again today. Increase free water to 250 Q4H and IVF bolus as above     H/o hypotension  - Midodrine dose increased to 20 mg Q 8 hours    - keep MAP above 60, taper off levophed    Type 2 NSTEMI   - Elevated Trops and Tachycardia (sepsis)    - c/w telemetry monitoring   - Repeat EKG: Normal sinus rhythm  - c/w  metoprolol    Seizure Disorder  - c/w  Keppra   - seizure precautions  - keep Mg above 2.0     H/o lower ext DVT  - cw therapeutic Lovenox, holding Eliquis     Normocytic Anemia - stable    Down Syndrome/  Cerebral Palsy  - supportive care  - prevent falls and aspiration   - failed speech and swallow, needs PEG as above  - on Raloxifene     Full code, overall prognosis is very poor, continue monitoring in SDU     Patient seen at bedside, total time spent to evaluate and treat the patient's acute illness and chronic medical conditions as well as time spent reviewing labs, radiology, discussing medical plan with covering medical team was more than 45 minutes with >50% of time spent face to face with patient, discussing with patient/family as well as coordination of care

## 2024-02-10 LAB
1,3 BETA GLUCAN SER QL: ABNORMAL
ALBUMIN SERPL ELPH-MCNC: 2.4 G/DL — LOW (ref 3.5–5.2)
ALP SERPL-CCNC: 79 U/L — SIGNIFICANT CHANGE UP (ref 30–115)
ALT FLD-CCNC: 21 U/L — SIGNIFICANT CHANGE UP (ref 0–41)
ANION GAP SERPL CALC-SCNC: 10 MMOL/L — SIGNIFICANT CHANGE UP (ref 7–14)
AST SERPL-CCNC: 20 U/L — SIGNIFICANT CHANGE UP (ref 0–41)
BASOPHILS # BLD AUTO: 0.06 K/UL — SIGNIFICANT CHANGE UP (ref 0–0.2)
BASOPHILS NFR BLD AUTO: 0.7 % — SIGNIFICANT CHANGE UP (ref 0–1)
BILIRUB SERPL-MCNC: <0.2 MG/DL — SIGNIFICANT CHANGE UP (ref 0.2–1.2)
BUN SERPL-MCNC: 16 MG/DL — SIGNIFICANT CHANGE UP (ref 10–20)
CALCIUM SERPL-MCNC: 8.1 MG/DL — LOW (ref 8.4–10.4)
CHLORIDE SERPL-SCNC: 102 MMOL/L — SIGNIFICANT CHANGE UP (ref 98–110)
CO2 SERPL-SCNC: 31 MMOL/L — SIGNIFICANT CHANGE UP (ref 17–32)
CREAT SERPL-MCNC: 0.5 MG/DL — LOW (ref 0.7–1.5)
EGFR: 109 ML/MIN/1.73M2 — SIGNIFICANT CHANGE UP
EOSINOPHIL # BLD AUTO: 0.39 K/UL — SIGNIFICANT CHANGE UP (ref 0–0.7)
EOSINOPHIL NFR BLD AUTO: 4.4 % — SIGNIFICANT CHANGE UP (ref 0–8)
FUNGITELL: 65 PG/ML
GAS PNL BLDA: SIGNIFICANT CHANGE UP
GLUCOSE SERPL-MCNC: 116 MG/DL — HIGH (ref 70–99)
H CAPSUL AG SER IA-MCNC: SIGNIFICANT CHANGE UP
HCT VFR BLD CALC: 29 % — LOW (ref 37–47)
HGB BLD-MCNC: 8.6 G/DL — LOW (ref 12–16)
IMM GRANULOCYTES NFR BLD AUTO: 0.9 % — HIGH (ref 0.1–0.3)
LYMPHOCYTES # BLD AUTO: 1.16 K/UL — LOW (ref 1.2–3.4)
LYMPHOCYTES # BLD AUTO: 13 % — LOW (ref 20.5–51.1)
MAGNESIUM SERPL-MCNC: 2.4 MG/DL — SIGNIFICANT CHANGE UP (ref 1.8–2.4)
MCHC RBC-ENTMCNC: 23.8 PG — LOW (ref 27–31)
MCHC RBC-ENTMCNC: 29.7 G/DL — LOW (ref 32–37)
MCV RBC AUTO: 80.3 FL — LOW (ref 81–99)
MONOCYTES # BLD AUTO: 0.55 K/UL — SIGNIFICANT CHANGE UP (ref 0.1–0.6)
MONOCYTES NFR BLD AUTO: 6.2 % — SIGNIFICANT CHANGE UP (ref 1.7–9.3)
NEUTROPHILS # BLD AUTO: 6.65 K/UL — HIGH (ref 1.4–6.5)
NEUTROPHILS NFR BLD AUTO: 74.8 % — SIGNIFICANT CHANGE UP (ref 42.2–75.2)
NRBC # BLD: 0 /100 WBCS — SIGNIFICANT CHANGE UP (ref 0–0)
PLATELET # BLD AUTO: 420 K/UL — HIGH (ref 130–400)
PMV BLD: 10.9 FL — HIGH (ref 7.4–10.4)
POTASSIUM SERPL-MCNC: 4.6 MMOL/L — SIGNIFICANT CHANGE UP (ref 3.5–5)
POTASSIUM SERPL-SCNC: 4.6 MMOL/L — SIGNIFICANT CHANGE UP (ref 3.5–5)
PROT SERPL-MCNC: 5.7 G/DL — LOW (ref 6–8)
RBC # BLD: 3.61 M/UL — LOW (ref 4.2–5.4)
RBC # FLD: 20.3 % — HIGH (ref 11.5–14.5)
SODIUM SERPL-SCNC: 143 MMOL/L — SIGNIFICANT CHANGE UP (ref 135–146)
WBC # BLD: 8.89 K/UL — SIGNIFICANT CHANGE UP (ref 4.8–10.8)
WBC # FLD AUTO: 8.89 K/UL — SIGNIFICANT CHANGE UP (ref 4.8–10.8)

## 2024-02-10 PROCEDURE — 99233 SBSQ HOSP IP/OBS HIGH 50: CPT

## 2024-02-10 PROCEDURE — 71045 X-RAY EXAM CHEST 1 VIEW: CPT | Mod: 26

## 2024-02-10 RX ORDER — SODIUM CHLORIDE 9 MG/ML
50 INJECTION, SOLUTION INTRAVENOUS ONCE
Refills: 0 | Status: DISCONTINUED | OUTPATIENT
Start: 2024-02-10 | End: 2024-02-10

## 2024-02-10 RX ORDER — SODIUM CHLORIDE 9 MG/ML
500 INJECTION, SOLUTION INTRAVENOUS ONCE
Refills: 0 | Status: COMPLETED | OUTPATIENT
Start: 2024-02-10 | End: 2024-02-10

## 2024-02-10 RX ORDER — NOREPINEPHRINE BITARTRATE/D5W 8 MG/250ML
0.05 PLASTIC BAG, INJECTION (ML) INTRAVENOUS
Qty: 8 | Refills: 0 | Status: DISCONTINUED | OUTPATIENT
Start: 2024-02-10 | End: 2024-02-12

## 2024-02-10 RX ADMIN — Medication 12.5 MILLIGRAM(S): at 17:52

## 2024-02-10 RX ADMIN — Medication 2 SPRAY(S): at 13:33

## 2024-02-10 RX ADMIN — GABAPENTIN 300 MILLIGRAM(S): 400 CAPSULE ORAL at 17:51

## 2024-02-10 RX ADMIN — ENOXAPARIN SODIUM 50 MILLIGRAM(S): 100 INJECTION SUBCUTANEOUS at 05:32

## 2024-02-10 RX ADMIN — Medication 3 MILLILITER(S): at 19:28

## 2024-02-10 RX ADMIN — Medication 3 MILLILITER(S): at 08:11

## 2024-02-10 RX ADMIN — MEROPENEM 100 MILLIGRAM(S): 1 INJECTION INTRAVENOUS at 21:28

## 2024-02-10 RX ADMIN — GABAPENTIN 300 MILLIGRAM(S): 400 CAPSULE ORAL at 05:33

## 2024-02-10 RX ADMIN — CHLORHEXIDINE GLUCONATE 1 APPLICATION(S): 213 SOLUTION TOPICAL at 12:48

## 2024-02-10 RX ADMIN — SCOPALAMINE 1 PATCH: 1 PATCH, EXTENDED RELEASE TRANSDERMAL at 06:00

## 2024-02-10 RX ADMIN — Medication 3 MILLILITER(S): at 13:43

## 2024-02-10 RX ADMIN — Medication 1 TABLET(S): at 17:52

## 2024-02-10 RX ADMIN — Medication 4.9 MICROGRAM(S)/KG/MIN: at 11:23

## 2024-02-10 RX ADMIN — MIDODRINE HYDROCHLORIDE 20 MILLIGRAM(S): 2.5 TABLET ORAL at 11:05

## 2024-02-10 RX ADMIN — Medication 1 TABLET(S): at 05:34

## 2024-02-10 RX ADMIN — Medication 1 APPLICATION(S): at 17:51

## 2024-02-10 RX ADMIN — Medication 3 MILLIGRAM(S): at 21:29

## 2024-02-10 RX ADMIN — MEROPENEM 100 MILLIGRAM(S): 1 INJECTION INTRAVENOUS at 13:33

## 2024-02-10 RX ADMIN — LEVETIRACETAM 500 MILLIGRAM(S): 250 TABLET, FILM COATED ORAL at 05:33

## 2024-02-10 RX ADMIN — Medication 1 DROP(S): at 05:33

## 2024-02-10 RX ADMIN — Medication 2 SPRAY(S): at 21:29

## 2024-02-10 RX ADMIN — RALOXIFENE HYDROCHLORIDE 60 MILLIGRAM(S): 60 TABLET, COATED ORAL at 11:08

## 2024-02-10 RX ADMIN — CASPOFUNGIN ACETATE 260 MILLIGRAM(S): 7 INJECTION, POWDER, LYOPHILIZED, FOR SOLUTION INTRAVENOUS at 11:25

## 2024-02-10 RX ADMIN — Medication 1 APPLICATION(S): at 05:33

## 2024-02-10 RX ADMIN — Medication 1 DROP(S): at 17:54

## 2024-02-10 RX ADMIN — Medication 2 SPRAY(S): at 05:34

## 2024-02-10 RX ADMIN — PANTOPRAZOLE SODIUM 40 MILLIGRAM(S): 20 TABLET, DELAYED RELEASE ORAL at 05:32

## 2024-02-10 RX ADMIN — SODIUM CHLORIDE 500 MILLILITER(S): 9 INJECTION, SOLUTION INTRAVENOUS at 08:41

## 2024-02-10 RX ADMIN — MIDODRINE HYDROCHLORIDE 20 MILLIGRAM(S): 2.5 TABLET ORAL at 17:51

## 2024-02-10 RX ADMIN — ENOXAPARIN SODIUM 50 MILLIGRAM(S): 100 INJECTION SUBCUTANEOUS at 17:52

## 2024-02-10 RX ADMIN — MIDODRINE HYDROCHLORIDE 20 MILLIGRAM(S): 2.5 TABLET ORAL at 05:33

## 2024-02-10 RX ADMIN — LEVETIRACETAM 500 MILLIGRAM(S): 250 TABLET, FILM COATED ORAL at 17:53

## 2024-02-10 RX ADMIN — MEROPENEM 100 MILLIGRAM(S): 1 INJECTION INTRAVENOUS at 05:31

## 2024-02-10 NOTE — PROGRESS NOTE ADULT - ASSESSMENT
58F w/ PMHx Down Syndrome, nonverbal at baseline, DVT on eliquis 5mg BID,  Hypothyroidism, aspiration pneumonia in prev admissions, Cerebral palsy and Seizure Disorders presents to the ED from nursing facility/group home ([facility]) presented to ED for respiratory distress and high grade fever. Patient found to be septic on admission. Admitted for management of acute respiratory distress likely 2/2 CAP or aspiration PNA.    #Sepsis POA (Febrile, Tachycardic, Leukocytosis)  #Acute Hypoxic Respiratory Failure secondary to Aspiration PNA / RSV  #Hx of Dysphagia - Puree Diet at baseline  - On admission: VBG Lactate 2.1 improved to wnl, procal 0.04 pH wnl and CO2 mildly elevated 61  - MRSA negative   - BCx (1/20): Staph hominis repeat BCx have been NGTD  - UCx: Neg  - RVP: RSV detected  - MRSA swab neg, urine strep neg, urine legionella neg  - D-dimer 605 and LE duplex neg  - CXR pending Bibasilar opacities without significant change.  - Fungitell chronically positive, received Caspofungin on previous admission, repeat Fungitell 63  - Repeat Flu/Covid/MRSA nares negative  - Will likely need PEG, ate minced and moist at group home now failing S+S, has NGT  - Spoke w/ La Nena Vasquez [phone],  of [employer] on 2/6 faxed over paperwork for PEG consent, GI notified. Will likely take 1+ week before consent is obtained  - Per Palliative care needs terminal diagnosis before CAB would consider DNR/DNI  - C/w Duonebs q6hr, Chest Vest, Saline Spray  - C/w Meropenem 1g IV q8hr for now  - C/w Caspofungin 50mg IV QD for now  - C/w NG feeds, free water 250mL q4hr  - High risk for Intubation    #Hypotension in setting of Septic Shock  - Monitor BP as patient is spiking fevers  - Keep MAP >60  - C/w Midodrine 20mg q8hr  - Wean levophed    #Elevated Troponin (Stabilized)  #Sinus Tachycardia (Controlled)  - Troponins: 25>37>35>35  - EKG shows sinus tachycardia, repeated  - Could likely be from hypovolemia/infectious state  - C/w Lopressor to 12.5mg BID w/ hold parameters    #Acute on Chronic Iron Deficiency Anemia (Stable)  - On admission: HgB 8.9 (previously 9.5)  - No evidence of hemorrhage  - B12 and Folate WNL normal  - Total Iron 15, Iron Saturation 6%, Ferritin 128 reflecting potential TIFFANI  - Hold off on iron supplementation in setting of suspected infection  - Keep HgB >7    #Hx of DVT of L Common Femoral Vein  - LE Duplex negative for DVT  - C/w Lovenox 50mg q12hr    #Seizure Disorder  - C/w Keppra 500mg BID     #Hx of Right Foot OM (1st Toe Stump and 2nd Distal Phalanx)  #Hx of Multiple Left Foot DTIs  #Hx of Sacral Wound  - Previous wound cultures positive for Proteus and ESBL E coli  - Patient underwent excisional debridement of ulcer of right foot (including 1st metatarsal) and partial amputation 2nd toe on previous admissions  - No acute surgical interventions from podiatry and burn  - C/w q2hr repositioning  - C/w local wound care  - Podiatry recalled 2/5 for foot wounds, updated recs placed     #Down Syndrome  #Cerebral Palsy  - C/w Gabapentin 300mg BID  - C/w Raloxifene 60mg QD    #Misc  #Code Status: Full Code  #DVT ppx: Lovenox BID  #GI prophylaxis: Protonix  #Diet: Tube feeds  #Disposition: SDU 58F w/ PMHx Down Syndrome, nonverbal at baseline, DVT on eliquis 5mg BID,  Hypothyroidism, aspiration pneumonia in prev admissions, Cerebral palsy and Seizure Disorders presents to the ED from nursing facility/group home ([facility]) presented to ED for respiratory distress and high grade fever. Patient found to be septic on admission. Admitted for management of acute respiratory distress likely 2/2 CAP or aspiration PNA.    #Sepsis POA (Febrile, Tachycardic, Leukocytosis)  #Acute Hypoxic Respiratory Failure secondary to Aspiration PNA / RSV  #Hx of Dysphagia - Puree Diet at baseline  - On admission: VBG Lactate 2.1 improved to wnl, procal 0.04 pH wnl and CO2 mildly elevated 61  - MRSA negative   - BCx (1/20): Staph hominis repeat BCx have been NGTD  - UCx: Neg  - RVP: RSV detected  - MRSA swab neg, urine strep neg, urine legionella neg  - D-dimer 605 and LE duplex neg  - CXR pending Bibasilar opacities without significant change.  - Fungitell chronically positive, received Caspofungin on previous admission, repeat Fungitell 63  - Repeat Flu/Covid/MRSA nares negative  - Will likely need PEG, ate minced and moist at group home now failing S+S, has NGT  - Spoke w/ La Nena Vasquez [phone],  of [employer] on 2/6 faxed over paperwork for PEG consent, GI notified. Will likely take 1+ week before consent is obtained  - Per Palliative care needs terminal diagnosis before CAB would consider DNR/DNI  - C/w Duonebs q6hr, Chest Vest, Saline Spray  - C/w Meropenem 1g IV q8hr for now  - C/w Caspofungin 50mg IV QD for now  - C/w NG feeds, free water 250mL q4hr  - Give 500mL fluid bolus over 1 hr today  - High risk for Intubation    #Hypotension in setting of Septic Shock  - Monitor BP as patient is spiking fevers  - Keep MAP >60  - C/w Midodrine 20mg q8hr  - Wean levophed    #Elevated Troponin (Stabilized)  #Sinus Tachycardia (Controlled)  - Troponins: 25>37>35>35  - EKG shows sinus tachycardia, repeated  - Could likely be from hypovolemia/infectious state  - C/w Lopressor to 12.5mg BID w/ hold parameters    #Acute on Chronic Iron Deficiency Anemia (Stable)  - On admission: HgB 8.9 (previously 9.5)  - No evidence of hemorrhage  - B12 and Folate WNL normal  - Total Iron 15, Iron Saturation 6%, Ferritin 128 reflecting potential TIFFANI  - Hold off on iron supplementation in setting of suspected infection  - Keep HgB >7    #Hx of DVT of L Common Femoral Vein  - LE Duplex negative for DVT  - C/w Lovenox 50mg q12hr    #Seizure Disorder  - C/w Keppra 500mg BID     #Hx of Right Foot OM (1st Toe Stump and 2nd Distal Phalanx)  #Hx of Multiple Left Foot DTIs  #Hx of Sacral Wound  - Previous wound cultures positive for Proteus and ESBL E coli  - Patient underwent excisional debridement of ulcer of right foot (including 1st metatarsal) and partial amputation 2nd toe on previous admissions  - No acute surgical interventions from podiatry and burn  - C/w q2hr repositioning  - C/w local wound care  - Podiatry recalled 2/5 for foot wounds, updated recs placed     #Down Syndrome  #Cerebral Palsy  - C/w Gabapentin 300mg BID  - C/w Raloxifene 60mg QD    #Misc  #Code Status: Full Code  #DVT ppx: Lovenox BID  #GI prophylaxis: Protonix  #Diet: Tube feeds  #Disposition: SDU

## 2024-02-10 NOTE — PROGRESS NOTE ADULT - ASSESSMENT
IMPRESSION:    Acute hypoxemic respiratory failure  Likely aspiration pneumonia   Sepsis POA  Septic shock  on Levophed   Recurrent aspiration pneumonia / prior intubation  HO GI bleed  HO OM  HO recent duodenal perforation   HO polymicrobial bacteremia   H/o CP, DS  H/o seizures    PLAN:    CNS:  Avoid CNS Depressant, AED. MS at baseline.     HEENT: Oral care.     PULMONARY: HOB at 45 degrees.  Aspiration precaution. Wean FiO2 Keep spo2 more than 92%.  Daily CXR. Aggressive pulm toilet, frequent suctioning.  Might need MV.    CARDIOVASCULAR: Keep MAP more than 60. Avoid overload. Wean levophed as able.  C/w midodrine 20mg.  Goal directed fluid resuscitation.      GI: Protonix. NG  feeding.  Speech and swallow recs noted. Might need PEG when more stable    INFECTIOUS DISEASE:    c/w Meropenem.  Finish empiric Caspo course 2/18. F/u blood Cx, Histo Ag, serum galactomannan    HEMATOLOGICAL:  Lovenox therapeutic.  Monitor CBC     ENDOCRINE:  Follow up FS.  Insulin protocol if needed.    MUSCULOSKELETAL: Bedrest.  Off loading.  Wound care.      Prognosis very poor.    Palliative care following    SDU. IMPRESSION:    Acute hypoxemic respiratory failure  Likely aspiration pneumonia   Sepsis POA  Septic shock  on Levophed   Recurrent aspiration pneumonia / prior intubation  HO DVT on Eliquis   HO GI bleed  HO OM  HO recent duodenal perforation   HO polymicrobial bacteremia   H/o CP, DS  H/o seizures    PLAN:    CNS:  Avoid CNS Depressant, AED. MS at baseline.     HEENT: Oral care.     PULMONARY: HOB at 45 degrees.  Aspiration precaution. Wean FiO2 Keep spo2 more than 92%.  Daily CXR. Aggressive pulm toilet, frequent suctioning.  Might need MV.    CARDIOVASCULAR: Keep MAP more than 60. Avoid overload. Wean levophed as able.  LR Bolus today. C/w midodrine 20mg.  Goal directed fluid resuscitation.      GI: Protonix. NG  feeding.  Speech and swallow recs noted. Might need PEG when more stable    INFECTIOUS DISEASE:    c/w Meropenem.  Finish empiric Caspo course 2/18. F/u blood Cx, serum galactomannan. Histo Ag negative,     HEMATOLOGICAL:  Lovenox therapeutic.  Monitor CBC     ENDOCRINE:  Follow up FS.  Insulin protocol if needed.    MUSCULOSKELETAL: Bedrest.  Off loading.  Wound care.      Prognosis very poor.    Palliative care following    SDU. IMPRESSION:    Acute hypoxemic respiratory failure on HHFNC 80%  Likely aspiration pneumonia   Sepsis POA  Septic shock  on Levophed   Recurrent aspiration pneumonia / prior intubation  HO DVT on Eliquis   HO GI bleed  HO OM  HO recent duodenal perforation   HO polymicrobial bacteremia   H/o CP, DS  H/o seizures    PLAN:    CNS:  Avoid CNS Depressant, AED. MS at baseline.     HEENT: Oral care.     PULMONARY: HOB at 45 degrees.  Aspiration precaution. Wean FiO2 Keep spo2 more than 92%.  Daily CXR. Aggressive pulm toilet, frequent suctioning.  Might need MV.    CARDIOVASCULAR: Keep MAP more than 60. Avoid overload. Wean levophed as able.  LR Bolus today. C/w midodrine 20mg.  Goal directed fluid resuscitation.      GI: Protonix. NG  feeding.  Speech and swallow recs noted. Might need PEG when more stable    INFECTIOUS DISEASE:    c/w Meropenem.  Finish empiric Caspo course 2/18. F/u blood Cx, serum galactomannan. Histo Ag negative,     HEMATOLOGICAL:  Lovenox therapeutic.  Monitor CBC     ENDOCRINE:  Follow up FS.  Insulin protocol if needed.    MUSCULOSKELETAL: Bedrest.  Off loading.  Wound care.      Prognosis very poor.    Palliative care following    SDU.

## 2024-02-10 NOTE — PROGRESS NOTE ADULT - SUBJECTIVE AND OBJECTIVE BOX
TEA ECHAVARRIA  58y Female    CHIEF COMPLAINT:    Patient is a 58y old  Female who presents with a chief complaint of Respiratory Distress (10 Feb 2024 07:37)    INTERVAL HPI/OVERNIGHT EVENTS:    Patient seen and examined. No acute events overnight. on HFNC 60/90 with NRB, remains critically ill on levophed 0.07    ROS: All other systems are negative.    Vital Signs:    T(F): 97.2 (02-10-24 @ 07:15), Max: 99.7 (24 @ 16:06)  HR: 101 (02-10-24 @ 07:30) (53 - 105)  BP: 91/54 (02-10-24 @ 07:30) (65/35 - 131/65)  RR: 20 (02-10-24 @ 07:30) (18 - 20)  SpO2: 93% (02-10-24 @ 07:30) (92% - 98%)    2024 07:01  -  10 Feb 2024 07:00  --------------------------------------------------------  IN: 375.4 mL / OUT: 900 mL / NET: -524.6 mL    Daily Weight in k (10 Feb 2024 00:00)    PHYSICAL EXAM:    GENERAL:  NAD chronically ill appearing   SKIN: No rashes or lesions  HEENT: Atraumatic. Normocephalic.    NECK: Supple, No JVD.    PULMONARY: decreased breath sounds B/L. No wheezing.   CVS: Normal S1, S2. Rate and Rhythm are regular.   ABDOMEN/GI: Soft, Nontender, Nondistended   MSK:  No clubbing or cyanosis   NEUROLOGIC: opens eyes spontaneously, does not follow commands   PSYCH: not oriented, nonverbal     Consultant(s) Notes Reviewed:  [x ] YES  [ ] NO  Care Discussed with Consultants/Other Providers [ x] YES  [ ] NO    LABS:                        8.6    8.89  )-----------( 420      ( 10 Feb 2024 05:25 )             29.0     143  |  102  |  16  ----------------------------<  116<H>  4.6   |  31  |  0.5<L>    Ca    8.1<L>      10 2024 05:25  Mg     2.4     02-10    TPro  5.7<L>  /  Alb  2.4<L>  /  TBili  <0.2  /  DBili  x   /  AST  20  /  ALT  21  /  AlkPhos  79  02-10    RADIOLOGY & ADDITIONAL TESTS:  Imaging or report Personally Reviewed:  [x] YES  [ ] NO  EKG reviewed: [x] YES  [ ] NO    Medications:  Standing  albuterol/ipratropium for Nebulization 3 milliLiter(s) Nebulizer <User Schedule>  artificial  tears Solution 1 Drop(s) Both EYES two times a day  calcium carbonate   1250 mG (OsCal) 1 Tablet(s) Oral two times a day  caspofungin IVPB      caspofungin IVPB 50 milliGRAM(s) IV Intermittent every 24 hours  chlorhexidine 2% Cloths 1 Application(s) Topical daily  enoxaparin Injectable 50 milliGRAM(s) SubCutaneous every 12 hours  gabapentin 300 milliGRAM(s) Oral every 12 hours  levETIRAcetam  Solution 500 milliGRAM(s) Oral every 12 hours  melatonin 3 milliGRAM(s) Oral at bedtime  meropenem  IVPB 1000 milliGRAM(s) IV Intermittent every 8 hours  metoprolol tartrate 12.5 milliGRAM(s) Oral two times a day  midodrine. 20 milliGRAM(s) Oral three times a day  norepinephrine Infusion 0.05 MICROgram(s)/kG/Min IV Continuous <Continuous>  pantoprazole  Injectable 40 milliGRAM(s) IV Push every 24 hours  polyethylene glycol 3350 17 Gram(s) Oral at bedtime  raloxifene 60 milliGRAM(s) Oral daily  scopolamine 1 mG/72 Hr(s) Patch 1 Patch Transdermal every 72 hours  senna 2 Tablet(s) Oral at bedtime  silver sulfADIAZINE 1% Cream 1 Application(s) Topical two times a day  sodium chloride 0.65% Nasal 2 Spray(s) Both Nostrils three times a day    PRN Meds  acetaminophen     Tablet .. 650 milliGRAM(s) Oral once PRN  acetaminophen     Tablet .. 650 milliGRAM(s) Oral every 6 hours PRN  aluminum hydroxide/magnesium hydroxide/simethicone Suspension 30 milliLiter(s) Oral every 4 hours PRN  ondansetron Injectable 4 milliGRAM(s) IV Push every 8 hours PRN

## 2024-02-10 NOTE — PROGRESS NOTE ADULT - SUBJECTIVE AND OBJECTIVE BOX
Interval History  Patient is a 58y old Female who presents with a chief complaint of Respiratory Distress (10 Feb 2024 07:05)    TEA ECHAVARRIA is admitted with a primary diagnosis of Sepsis with acute hypoxic respiratory failure      Today is hospital day 21d. The patient was examined at the bedside this morning. No acute overnight events were reported.    Admission HPI  HPI:  58F w/ PMHx Down Syndrome, nonverbal at baseline, Hypothyroidism, Cerebral palsy and Seizure Disorders presents to the ED from nursing facility/group home ([facility] [place]) presented to ED for respiratory distress and high grade fever. Patient found to be septic on admission. As per aide Janette at bedside, patient had been coughing and congested for 3x days. Denies fevers, chills, n/v/d or pain prior to admission. Patient is on a puree diet at baseline.    Vitals: Temp 104.5F (rectal), /74, , RR 24, SpO2 100% on BiPAP    Labs: Hgb 9.5 (previously 11.1), Platelet 422, INR 1.43, VBG Lactate 2.1    Imaging: CXR shows possible RML infiltrate    In the ED:  - s/p Cefepime 2g IV x1  - s/p Levaquin 750mg IV x1  - s/p 2L LR bolus    Admitted to SDU for management of acute respiratory distress 2/2 CAP/Aspiration PNA (20 Jan 2024 18:22)      Past Medical and Surgical History  Down syndrome    Osteoporosis    Mild anemia    Neuropathy    S/P debridement  of R hip on 3/2/21      Family History  FAMILY HISTORY:    Social History  Social History:      Allergies  No Known Allergies    Medications  Standing Medications  albuterol/ipratropium for Nebulization 3 milliLiter(s) Nebulizer <User Schedule>  artificial  tears Solution 1 Drop(s) Both EYES two times a day  calcium carbonate   1250 mG (OsCal) 1 Tablet(s) Oral two times a day  caspofungin IVPB 50 milliGRAM(s) IV Intermittent every 24 hours  caspofungin IVPB      chlorhexidine 2% Cloths 1 Application(s) Topical daily  enoxaparin Injectable 50 milliGRAM(s) SubCutaneous every 12 hours  gabapentin 300 milliGRAM(s) Oral every 12 hours  levETIRAcetam  Solution 500 milliGRAM(s) Oral every 12 hours  melatonin 3 milliGRAM(s) Oral at bedtime  meropenem  IVPB 1000 milliGRAM(s) IV Intermittent every 8 hours  metoprolol tartrate 12.5 milliGRAM(s) Oral two times a day  midodrine. 20 milliGRAM(s) Oral three times a day  norepinephrine Infusion 0.05 MICROgram(s)/kG/Min IV Continuous <Continuous>  pantoprazole  Injectable 40 milliGRAM(s) IV Push every 24 hours  polyethylene glycol 3350 17 Gram(s) Oral at bedtime  raloxifene 60 milliGRAM(s) Oral daily  scopolamine 1 mG/72 Hr(s) Patch 1 Patch Transdermal every 72 hours  senna 2 Tablet(s) Oral at bedtime  silver sulfADIAZINE 1% Cream 1 Application(s) Topical two times a day  sodium chloride 0.65% Nasal 2 Spray(s) Both Nostrils three times a day    PRN Medications  acetaminophen     Tablet .. 650 milliGRAM(s) Oral once PRN  acetaminophen     Tablet .. 650 milliGRAM(s) Oral every 6 hours PRN  aluminum hydroxide/magnesium hydroxide/simethicone Suspension 30 milliLiter(s) Oral every 4 hours PRN  ondansetron Injectable 4 milliGRAM(s) IV Push every 8 hours PRN    Vitals  T(F): 98.5  HR: 62  BP: 131/65  RR: 18  SpO2: 98%    I&O's    02-09-24 @ 07:01  -  02-10-24 @ 07:00  --------------------------------------------------------  IN: 375.4 mL / OUT: 900 mL / NET: -524.6 mL        Physical Examination  General: Non-verbal, Down Syndrome  Head: NGT  Neck: Supple, no JVD  Cardiac: Regular rate and rhythm  Pulmonary: No wheeze, shallow breath sounds  Abdominal: Soft, nontender, and nondistended  Extremities: No pitting edema  Neurologic: Alert to name, nonverbal    Labs                        8.6    8.89  )-----------( 420      ( 10 Feb 2024 05:25 )             29.0     02-10    143  |  102  |  16  ----------------------------<  116<H>  4.6   |  31  |  0.5<L>    Ca    8.1<L>      10 Feb 2024 05:25  Mg     2.4     02-10    TPro  5.7<L>  /  Alb  2.4<L>  /  TBili  <0.2  /  DBili  x   /  AST  20  /  ALT  21  /  AlkPhos  79  02-10      Urinalysis Basic - ( 10 Feb 2024 05:25 )    Color: x / Appearance: x / SG: x / pH: x  Gluc: 116 mg/dL / Ketone: x  / Bili: x / Urobili: x   Blood: x / Protein: x / Nitrite: x   Leuk Esterase: x / RBC: x / WBC x   Sq Epi: x / Non Sq Epi: x / Bacteria: x      CAPILLARY BLOOD GLUCOSE            Imaging  CXR      CT

## 2024-02-10 NOTE — PROGRESS NOTE ADULT - SUBJECTIVE AND OBJECTIVE BOX
Patient is a 58y old  Female who presents with a chief complaint of Respiratory Distress (09 Feb 2024 16:10)        Over Night Events:  Still critically ill  On HFNC         ROS:     All ROS are negative except HPI         PHYSICAL EXAM    ICU Vital Signs Last 24 Hrs  T(C): 36.9 (10 Feb 2024 04:00), Max: 37.6 (09 Feb 2024 16:06)  T(F): 98.5 (10 Feb 2024 04:00), Max: 99.7 (09 Feb 2024 16:06)  HR: 62 (10 Feb 2024 06:00) (53 - 105)  BP: 131/65 (10 Feb 2024 06:00) (65/35 - 131/65)  BP(mean): 92 (10 Feb 2024 06:00) (45 - 92)  RR: 18 (10 Feb 2024 04:00) (18 - 21)  SpO2: 98% (10 Feb 2024 04:00) (94% - 98%)    O2 Parameters below as of 09 Feb 2024 20:50  Patient On (Oxygen Delivery Method): nasal cannula, high flow  O2 Flow (L/min): 60  O2 Concentration (%): 90        Ill appearing  ENT: Airway patent,  EYES: Pupils equal, Round and reactive to light.  CARDIAC: Normal rate, Regular rhythm.    RESPIRATORY: No wheezing, Bilateral crackles, Not tachypneic, No use of accessory muscles  GASTROINTESTINAL: Abdomen soft, Non-tender, No guarding,   NEUROLOGICAL: Alert and awake. MS at baseline  SKIN: Skin normal color for race, Warm and dry and intact.         02-09-24 @ 07:01  -  02-10-24 @ 07:00  --------------------------------------------------------  IN:    Jevity 1.2: 300 mL    Norepinephrine: 27.2 mL    Norepinephrine: 48.2 mL  Total IN: 375.4 mL    OUT:    Voided (mL): 900 mL  Total OUT: 900 mL    Total NET: -524.6 mL          LABS:    ???                                                 Urinalysis Basic - ( 09 Feb 2024 06:08 )    Color: x / Appearance: x / SG: x / pH: x  Gluc: 128 mg/dL / Ketone: x  / Bili: x / Urobili: x   Blood: x / Protein: x / Nitrite: x   Leuk Esterase: x / RBC: x / WBC x   Sq Epi: x / Non Sq Epi: x / Bacteria: x                                                  LIVER FUNCTIONS - ( 09 Feb 2024 06:08 )  Alb: 2.5 g/dL / Pro: 5.9 g/dL / ALK PHOS: 87 U/L / ALT: 26 U/L / AST: 24 U/L / GGT: x                                                                                                                                       MEDICATIONS  (STANDING):  albuterol/ipratropium for Nebulization 3 milliLiter(s) Nebulizer <User Schedule>  artificial  tears Solution 1 Drop(s) Both EYES two times a day  calcium carbonate   1250 mG (OsCal) 1 Tablet(s) Oral two times a day  caspofungin IVPB 50 milliGRAM(s) IV Intermittent every 24 hours  caspofungin IVPB      chlorhexidine 2% Cloths 1 Application(s) Topical daily  enoxaparin Injectable 50 milliGRAM(s) SubCutaneous every 12 hours  gabapentin 300 milliGRAM(s) Oral every 12 hours  levETIRAcetam  Solution 500 milliGRAM(s) Oral every 12 hours  melatonin 3 milliGRAM(s) Oral at bedtime  meropenem  IVPB 1000 milliGRAM(s) IV Intermittent every 8 hours  metoprolol tartrate 12.5 milliGRAM(s) Oral two times a day  midodrine. 20 milliGRAM(s) Oral three times a day  norepinephrine Infusion 0.05 MICROgram(s)/kG/Min (4.9 mL/Hr) IV Continuous <Continuous>  pantoprazole  Injectable 40 milliGRAM(s) IV Push every 24 hours  polyethylene glycol 3350 17 Gram(s) Oral at bedtime  raloxifene 60 milliGRAM(s) Oral daily  scopolamine 1 mG/72 Hr(s) Patch 1 Patch Transdermal every 72 hours  senna 2 Tablet(s) Oral at bedtime  silver sulfADIAZINE 1% Cream 1 Application(s) Topical two times a day  sodium chloride 0.65% Nasal 2 Spray(s) Both Nostrils three times a day    MEDICATIONS  (PRN):  acetaminophen     Tablet .. 650 milliGRAM(s) Oral once PRN Temp greater or equal to 38.5C (101.3F)  acetaminophen     Tablet .. 650 milliGRAM(s) Oral every 6 hours PRN Temp greater or equal to 38C (100.4F), Mild Pain (1 - 3)  aluminum hydroxide/magnesium hydroxide/simethicone Suspension 30 milliLiter(s) Oral every 4 hours PRN Dyspepsia  ondansetron Injectable 4 milliGRAM(s) IV Push every 8 hours PRN Nausea and/or Vomiting         Patient is a 58y old  Female who presents with a chief complaint of Respiratory Distress (09 Feb 2024 16:10)        Over Night Events:  Still critically ill  On HFNC 60L 87%  Levo 0.07        ROS:     All ROS are negative except HPI         PHYSICAL EXAM    ICU Vital Signs Last 24 Hrs  T(C): 36.9 (10 Feb 2024 04:00), Max: 37.6 (09 Feb 2024 16:06)  T(F): 98.5 (10 Feb 2024 04:00), Max: 99.7 (09 Feb 2024 16:06)  HR: 62 (10 Feb 2024 06:00) (53 - 105)  BP: 131/65 (10 Feb 2024 06:00) (65/35 - 131/65)  BP(mean): 92 (10 Feb 2024 06:00) (45 - 92)  RR: 18 (10 Feb 2024 04:00) (18 - 21)  SpO2: 98% (10 Feb 2024 04:00) (94% - 98%)    O2 Parameters below as of 09 Feb 2024 20:50  Patient On (Oxygen Delivery Method): nasal cannula, high flow  O2 Flow (L/min): 60  O2 Concentration (%): 90      Ill appearing  ENT: Airway patent,  EYES: Pupils equal, Round and reactive to light.  CARDIAC: Normal rate, Regular rhythm.    RESPIRATORY: No wheezing, Bilateral crackles, Not tachypneic, No use of accessory muscles  GASTROINTESTINAL: Abdomen soft, Non-tender, No guarding,   NEUROLOGICAL: Alert and awake. MS at baseline  SKIN: Skin normal color for race, Warm and dry and intact.         02-09-24 @ 07:01  -  02-10-24 @ 07:00  --------------------------------------------------------  IN:    Jevity 1.2: 300 mL    Norepinephrine: 27.2 mL    Norepinephrine: 48.2 mL  Total IN: 375.4 mL    OUT:    Voided (mL): 900 mL  Total OUT: 900 mL    Total NET: -524.6 mL        LABS:                            8.6    8.89  )-----------( 420      ( 10 Feb 2024 05:25 )             29.0                                               02-10    143  |  102  |  16  ----------------------------<  116<H>  4.6   |  31  |  0.5<L>    Ca    8.1<L>      10 Feb 2024 05:25  Mg     2.4     02-10    TPro  5.7<L>  /  Alb  2.4<L>  /  TBili  <0.2  /  DBili  x   /  AST  20  /  ALT  21  /  AlkPhos  79  02-10                                             Urinalysis Basic - ( 10 Feb 2024 05:25 )    Color: x / Appearance: x / SG: x / pH: x  Gluc: 116 mg/dL / Ketone: x  / Bili: x / Urobili: x   Blood: x / Protein: x / Nitrite: x   Leuk Esterase: x / RBC: x / WBC x   Sq Epi: x / Non Sq Epi: x / Bacteria: x                                                  LIVER FUNCTIONS - ( 10 Feb 2024 05:25 )  Alb: 2.4 g/dL / Pro: 5.7 g/dL / ALK PHOS: 79 U/L / ALT: 21 U/L / AST: 20 U/L / GGT: x                                                                                                                                       MEDICATIONS  (STANDING):  albuterol/ipratropium for Nebulization 3 milliLiter(s) Nebulizer <User Schedule>  artificial  tears Solution 1 Drop(s) Both EYES two times a day  calcium carbonate   1250 mG (OsCal) 1 Tablet(s) Oral two times a day  caspofungin IVPB      caspofungin IVPB 50 milliGRAM(s) IV Intermittent every 24 hours  chlorhexidine 2% Cloths 1 Application(s) Topical daily  enoxaparin Injectable 50 milliGRAM(s) SubCutaneous every 12 hours  gabapentin 300 milliGRAM(s) Oral every 12 hours  levETIRAcetam  Solution 500 milliGRAM(s) Oral every 12 hours  melatonin 3 milliGRAM(s) Oral at bedtime  meropenem  IVPB 1000 milliGRAM(s) IV Intermittent every 8 hours  metoprolol tartrate 12.5 milliGRAM(s) Oral two times a day  midodrine. 20 milliGRAM(s) Oral three times a day  norepinephrine Infusion 0.05 MICROgram(s)/kG/Min (4.9 mL/Hr) IV Continuous <Continuous>  pantoprazole  Injectable 40 milliGRAM(s) IV Push every 24 hours  polyethylene glycol 3350 17 Gram(s) Oral at bedtime  raloxifene 60 milliGRAM(s) Oral daily  scopolamine 1 mG/72 Hr(s) Patch 1 Patch Transdermal every 72 hours  senna 2 Tablet(s) Oral at bedtime  silver sulfADIAZINE 1% Cream 1 Application(s) Topical two times a day  sodium chloride 0.65% Nasal 2 Spray(s) Both Nostrils three times a day    MEDICATIONS  (PRN):  acetaminophen     Tablet .. 650 milliGRAM(s) Oral once PRN Temp greater or equal to 38.5C (101.3F)  acetaminophen     Tablet .. 650 milliGRAM(s) Oral every 6 hours PRN Temp greater or equal to 38C (100.4F), Mild Pain (1 - 3)  aluminum hydroxide/magnesium hydroxide/simethicone Suspension 30 milliLiter(s) Oral every 4 hours PRN Dyspepsia  ondansetron Injectable 4 milliGRAM(s) IV Push every 8 hours PRN Nausea and/or Vomiting             Patient is a 58y old  Female who presents with a chief complaint of Respiratory Distress (09 Feb 2024 16:10)        Over Night Events:  Still critically ill  On HFNC 60L 90%  Levo 0.07    PHYSICAL EXAM    ICU Vital Signs Last 24 Hrs  T(C): 36.9 (10 Feb 2024 04:00), Max: 37.6 (09 Feb 2024 16:06)  T(F): 98.5 (10 Feb 2024 04:00), Max: 99.7 (09 Feb 2024 16:06)  HR: 62 (10 Feb 2024 06:00) (53 - 105)  BP: 131/65 (10 Feb 2024 06:00) (65/35 - 131/65)  BP(mean): 92 (10 Feb 2024 06:00) (45 - 92)  RR: 18 (10 Feb 2024 04:00) (18 - 21)  SpO2: 98% (10 Feb 2024 04:00) (94% - 98%)    O2 Parameters below as of 09 Feb 2024 20:50  Patient On (Oxygen Delivery Method): nasal cannula, high flow  O2 Flow (L/min): 60  O2 Concentration (%): 90      Ill appearing  CARDIAC: Normal rate, Regular rhythm.    RESPIRATORY: l side rhonchi  GASTROINTESTINAL: Abdomen soft, Non-tender, No guarding,   NEUROLOGICAL: Alert and awake. MS at baseline        02-09-24 @ 07:01  -  02-10-24 @ 07:00  --------------------------------------------------------  IN:    Jevity 1.2: 300 mL    Norepinephrine: 27.2 mL    Norepinephrine: 48.2 mL  Total IN: 375.4 mL    OUT:    Voided (mL): 900 mL  Total OUT: 900 mL    Total NET: -524.6 mL        LABS:                            8.6    8.89  )-----------( 420      ( 10 Feb 2024 05:25 )             29.0                                               02-10    143  |  102  |  16  ----------------------------<  116<H>  4.6   |  31  |  0.5<L>    Ca    8.1<L>      10 Feb 2024 05:25  Mg     2.4     02-10    TPro  5.7<L>  /  Alb  2.4<L>  /  TBili  <0.2  /  DBili  x   /  AST  20  /  ALT  21  /  AlkPhos  79  02-10                                             Urinalysis Basic - ( 10 Feb 2024 05:25 )    Color: x / Appearance: x / SG: x / pH: x  Gluc: 116 mg/dL / Ketone: x  / Bili: x / Urobili: x   Blood: x / Protein: x / Nitrite: x   Leuk Esterase: x / RBC: x / WBC x   Sq Epi: x / Non Sq Epi: x / Bacteria: x                                                  LIVER FUNCTIONS - ( 10 Feb 2024 05:25 )  Alb: 2.4 g/dL / Pro: 5.7 g/dL / ALK PHOS: 79 U/L / ALT: 21 U/L / AST: 20 U/L / GGT: x                                                                                                                                       MEDICATIONS  (STANDING):  albuterol/ipratropium for Nebulization 3 milliLiter(s) Nebulizer <User Schedule>  artificial  tears Solution 1 Drop(s) Both EYES two times a day  calcium carbonate   1250 mG (OsCal) 1 Tablet(s) Oral two times a day  caspofungin IVPB      caspofungin IVPB 50 milliGRAM(s) IV Intermittent every 24 hours  chlorhexidine 2% Cloths 1 Application(s) Topical daily  enoxaparin Injectable 50 milliGRAM(s) SubCutaneous every 12 hours  gabapentin 300 milliGRAM(s) Oral every 12 hours  levETIRAcetam  Solution 500 milliGRAM(s) Oral every 12 hours  melatonin 3 milliGRAM(s) Oral at bedtime  meropenem  IVPB 1000 milliGRAM(s) IV Intermittent every 8 hours  metoprolol tartrate 12.5 milliGRAM(s) Oral two times a day  midodrine. 20 milliGRAM(s) Oral three times a day  norepinephrine Infusion 0.05 MICROgram(s)/kG/Min (4.9 mL/Hr) IV Continuous <Continuous>  pantoprazole  Injectable 40 milliGRAM(s) IV Push every 24 hours  polyethylene glycol 3350 17 Gram(s) Oral at bedtime  raloxifene 60 milliGRAM(s) Oral daily  scopolamine 1 mG/72 Hr(s) Patch 1 Patch Transdermal every 72 hours  senna 2 Tablet(s) Oral at bedtime  silver sulfADIAZINE 1% Cream 1 Application(s) Topical two times a day  sodium chloride 0.65% Nasal 2 Spray(s) Both Nostrils three times a day    MEDICATIONS  (PRN):  acetaminophen     Tablet .. 650 milliGRAM(s) Oral once PRN Temp greater or equal to 38.5C (101.3F)  acetaminophen     Tablet .. 650 milliGRAM(s) Oral every 6 hours PRN Temp greater or equal to 38C (100.4F), Mild Pain (1 - 3)  aluminum hydroxide/magnesium hydroxide/simethicone Suspension 30 milliLiter(s) Oral every 4 hours PRN Dyspepsia  ondansetron Injectable 4 milliGRAM(s) IV Push every 8 hours PRN Nausea and/or Vomiting

## 2024-02-10 NOTE — PROGRESS NOTE ADULT - ASSESSMENT
58F w/ PMHx Down Syndrome, nonverbal at baseline, Hypothyroidism, Cerebral palsy and Seizure Disorders presents to the ED from nursing facility/group home ([facility]) presented to ED for respiratory distress and high grade fever. Patient found to be septic on admission.Admitted to SDU for management of acute respiratory distress 2/2 CAP/Aspiration PNA (20 Jan 2024 18:22)  While pt was on the floor she developed dyspnea after swallow evaluation, was spiking high grade fever, consulted by pulmonary/critical care and was upgraded back to SDU.    Sepsis POA / Acute hypoxic resp failure / RSV bronchiolitis /  H/o Dysphagia/ suspected aspiration  - inflammatory markers elevated, RVP is negative, MRSA neg  - BCx (1/20): Staph hominis -  contaminant repeat BCx have been NGTD  - Failed FEES, s/p  NG tube for feedings and medications, patient will need PEG tube once improved. Recall GI once off HFNC   - Aspiration precautions, Chest PT vest , c/w duonebs  - ID is following, recommendations noted:   - c/w  Caspo 50mg q24h IV   - Repeat fungitell 63, indeterminate  - c/w meropenem 1g q8h IV  -  histoplasma Ab serum negative   - on HFNC/NRB Keep spo2 more than 92%. May need intubation   - remains on levophed  and midodrine 20 Q8H     Hypernatremia - improved, cw free water to 250 Q4H     H/o hypotension  - Midodrine dose increased to 20 mg Q 8 hours    - keep MAP above 60, taper off levophed as able     Type 2 NSTEMI   - Elevated Trops and Tachycardia (sepsis)    - c/w telemetry monitoring   - Repeat EKG: Normal sinus rhythm  - c/w  metoprolol    Seizure Disorder  - c/w  Keppra   - seizure precautions  - keep Mg above 2.0     H/o lower ext DVT  - cw therapeutic Lovenox, holding Eliquis     Normocytic Anemia - stable    Down Syndrome/  Cerebral Palsy  - supportive care  - prevent falls and aspiration   - failed speech and swallow, needs PEG as above  - on Raloxifene     Full code, overall prognosis is very poor, continue monitoring in SDU     Patient seen at bedside, total time spent to evaluate and treat the patient's acute illness and chronic medical conditions as well as time spent reviewing labs, radiology, discussing medical plan with covering medical team was more than 50 minutes with >50% of time spent face to face with patient, discussing with patient/family as well as coordination of care            58F w/ PMHx Down Syndrome, nonverbal at baseline, Hypothyroidism, Cerebral palsy and Seizure Disorders presents to the ED from nursing facility/group home ([facility]) presented to ED for respiratory distress and high grade fever. Patient found to be septic on admission.Admitted to SDU for management of acute respiratory distress 2/2 CAP/Aspiration PNA (20 Jan 2024 18:22)  While pt was on the floor she developed dyspnea after swallow evaluation, was spiking high grade fever, consulted by pulmonary/critical care and was upgraded back to SDU.    Sepsis POA / Acute hypoxic resp failure / RSV bronchiolitis /  H/o Dysphagia/ suspected aspiration  - inflammatory markers elevated, RVP is negative, MRSA neg  - BCx (1/20): Staph hominis -  contaminant repeat BCx have been NGTD  - Failed FEES, s/p  NG tube for feedings and medications, patient will need PEG tube once improved. Recall GI once off HFNC   - Aspiration precautions, Chest PT vest , c/w duonebs  - ID is following, recommendations noted:   - c/w  Caspo 50mg q24h IV   - Repeat fungitell 63, indeterminate  - c/w meropenem 1g q8h IV  -  histoplasma Ab serum negative   - needs CT A/P once more stable   - on HFNC/NRB Keep spo2 more than 92%. May need intubation   - remains on levophed  and midodrine 20 Q8H     Hypernatremia - improved, cw free water to 250 Q4H     H/o hypotension  - Midodrine dose increased to 20 mg Q 8 hours    - keep MAP above 60, taper off levophed as able     Type 2 NSTEMI   - Elevated Trops and Tachycardia (sepsis)    - c/w telemetry monitoring   - Repeat EKG: Normal sinus rhythm  - c/w  metoprolol    Seizure Disorder  - c/w  Keppra   - seizure precautions  - keep Mg above 2.0     H/o lower ext DVT  - cw therapeutic Lovenox, holding Eliquis     Normocytic Anemia - stable    Down Syndrome/  Cerebral Palsy  - supportive care  - prevent falls and aspiration   - failed speech and swallow, needs PEG as above  - on Raloxifene     Full code, overall prognosis is very poor, continue monitoring in SDU     Patient seen at bedside, total time spent to evaluate and treat the patient's acute illness and chronic medical conditions as well as time spent reviewing labs, radiology, discussing medical plan with covering medical team was more than 50 minutes with >50% of time spent face to face with patient, discussing with patient/family as well as coordination of care

## 2024-02-11 LAB
ALBUMIN SERPL ELPH-MCNC: 2.5 G/DL — LOW (ref 3.5–5.2)
ALP SERPL-CCNC: 82 U/L — SIGNIFICANT CHANGE UP (ref 30–115)
ALT FLD-CCNC: 17 U/L — SIGNIFICANT CHANGE UP (ref 0–41)
ANION GAP SERPL CALC-SCNC: 11 MMOL/L — SIGNIFICANT CHANGE UP (ref 7–14)
AST SERPL-CCNC: 20 U/L — SIGNIFICANT CHANGE UP (ref 0–41)
BASOPHILS # BLD AUTO: 0.05 K/UL — SIGNIFICANT CHANGE UP (ref 0–0.2)
BASOPHILS NFR BLD AUTO: 0.4 % — SIGNIFICANT CHANGE UP (ref 0–1)
BILIRUB SERPL-MCNC: 0.2 MG/DL — SIGNIFICANT CHANGE UP (ref 0.2–1.2)
BUN SERPL-MCNC: 13 MG/DL — SIGNIFICANT CHANGE UP (ref 10–20)
CALCIUM SERPL-MCNC: 8 MG/DL — LOW (ref 8.4–10.4)
CHLORIDE SERPL-SCNC: 99 MMOL/L — SIGNIFICANT CHANGE UP (ref 98–110)
CO2 SERPL-SCNC: 30 MMOL/L — SIGNIFICANT CHANGE UP (ref 17–32)
CREAT SERPL-MCNC: 0.5 MG/DL — LOW (ref 0.7–1.5)
EGFR: 109 ML/MIN/1.73M2 — SIGNIFICANT CHANGE UP
EOSINOPHIL # BLD AUTO: 0.32 K/UL — SIGNIFICANT CHANGE UP (ref 0–0.7)
EOSINOPHIL NFR BLD AUTO: 2.9 % — SIGNIFICANT CHANGE UP (ref 0–8)
GLUCOSE SERPL-MCNC: 154 MG/DL — HIGH (ref 70–99)
HCT VFR BLD CALC: 27.2 % — LOW (ref 37–47)
HGB BLD-MCNC: 8.2 G/DL — LOW (ref 12–16)
IMM GRANULOCYTES NFR BLD AUTO: 0.8 % — HIGH (ref 0.1–0.3)
LYMPHOCYTES # BLD AUTO: 1.46 K/UL — SIGNIFICANT CHANGE UP (ref 1.2–3.4)
LYMPHOCYTES # BLD AUTO: 13 % — LOW (ref 20.5–51.1)
MAGNESIUM SERPL-MCNC: 2.3 MG/DL — SIGNIFICANT CHANGE UP (ref 1.8–2.4)
MCHC RBC-ENTMCNC: 23.9 PG — LOW (ref 27–31)
MCHC RBC-ENTMCNC: 30.1 G/DL — LOW (ref 32–37)
MCV RBC AUTO: 79.3 FL — LOW (ref 81–99)
MONOCYTES # BLD AUTO: 0.62 K/UL — HIGH (ref 0.1–0.6)
MONOCYTES NFR BLD AUTO: 5.5 % — SIGNIFICANT CHANGE UP (ref 1.7–9.3)
NEUTROPHILS # BLD AUTO: 8.68 K/UL — HIGH (ref 1.4–6.5)
NEUTROPHILS NFR BLD AUTO: 77.4 % — HIGH (ref 42.2–75.2)
NRBC # BLD: 0 /100 WBCS — SIGNIFICANT CHANGE UP (ref 0–0)
PLATELET # BLD AUTO: 521 K/UL — HIGH (ref 130–400)
PMV BLD: 11.5 FL — HIGH (ref 7.4–10.4)
POTASSIUM SERPL-MCNC: 4.5 MMOL/L — SIGNIFICANT CHANGE UP (ref 3.5–5)
POTASSIUM SERPL-SCNC: 4.5 MMOL/L — SIGNIFICANT CHANGE UP (ref 3.5–5)
PROT SERPL-MCNC: 6 G/DL — SIGNIFICANT CHANGE UP (ref 6–8)
RBC # BLD: 3.43 M/UL — LOW (ref 4.2–5.4)
RBC # FLD: 20.6 % — HIGH (ref 11.5–14.5)
SODIUM SERPL-SCNC: 140 MMOL/L — SIGNIFICANT CHANGE UP (ref 135–146)
WBC # BLD: 11.22 K/UL — HIGH (ref 4.8–10.8)
WBC # FLD AUTO: 11.22 K/UL — HIGH (ref 4.8–10.8)

## 2024-02-11 PROCEDURE — 93970 EXTREMITY STUDY: CPT | Mod: 26

## 2024-02-11 PROCEDURE — 99233 SBSQ HOSP IP/OBS HIGH 50: CPT

## 2024-02-11 PROCEDURE — 71045 X-RAY EXAM CHEST 1 VIEW: CPT | Mod: 26

## 2024-02-11 RX ORDER — SODIUM CHLORIDE 9 MG/ML
500 INJECTION, SOLUTION INTRAVENOUS ONCE
Refills: 0 | Status: COMPLETED | OUTPATIENT
Start: 2024-02-11 | End: 2024-02-11

## 2024-02-11 RX ADMIN — Medication 2 SPRAY(S): at 05:25

## 2024-02-11 RX ADMIN — Medication 1 APPLICATION(S): at 05:25

## 2024-02-11 RX ADMIN — Medication 2 SPRAY(S): at 21:20

## 2024-02-11 RX ADMIN — Medication 1 TABLET(S): at 05:22

## 2024-02-11 RX ADMIN — MIDODRINE HYDROCHLORIDE 20 MILLIGRAM(S): 2.5 TABLET ORAL at 17:42

## 2024-02-11 RX ADMIN — LEVETIRACETAM 500 MILLIGRAM(S): 250 TABLET, FILM COATED ORAL at 19:26

## 2024-02-11 RX ADMIN — GABAPENTIN 300 MILLIGRAM(S): 400 CAPSULE ORAL at 05:24

## 2024-02-11 RX ADMIN — PANTOPRAZOLE SODIUM 40 MILLIGRAM(S): 20 TABLET, DELAYED RELEASE ORAL at 05:21

## 2024-02-11 RX ADMIN — MEROPENEM 100 MILLIGRAM(S): 1 INJECTION INTRAVENOUS at 14:16

## 2024-02-11 RX ADMIN — LEVETIRACETAM 500 MILLIGRAM(S): 250 TABLET, FILM COATED ORAL at 05:21

## 2024-02-11 RX ADMIN — Medication 3 MILLILITER(S): at 07:57

## 2024-02-11 RX ADMIN — CHLORHEXIDINE GLUCONATE 1 APPLICATION(S): 213 SOLUTION TOPICAL at 12:05

## 2024-02-11 RX ADMIN — RALOXIFENE HYDROCHLORIDE 60 MILLIGRAM(S): 60 TABLET, COATED ORAL at 12:04

## 2024-02-11 RX ADMIN — ENOXAPARIN SODIUM 50 MILLIGRAM(S): 100 INJECTION SUBCUTANEOUS at 17:43

## 2024-02-11 RX ADMIN — Medication 1 TABLET(S): at 17:42

## 2024-02-11 RX ADMIN — CASPOFUNGIN ACETATE 260 MILLIGRAM(S): 7 INJECTION, POWDER, LYOPHILIZED, FOR SOLUTION INTRAVENOUS at 12:05

## 2024-02-11 RX ADMIN — Medication 1 DROP(S): at 17:43

## 2024-02-11 RX ADMIN — Medication 1 APPLICATION(S): at 18:19

## 2024-02-11 RX ADMIN — MEROPENEM 100 MILLIGRAM(S): 1 INJECTION INTRAVENOUS at 05:25

## 2024-02-11 RX ADMIN — GABAPENTIN 300 MILLIGRAM(S): 400 CAPSULE ORAL at 17:43

## 2024-02-11 RX ADMIN — Medication 3 MILLIGRAM(S): at 21:21

## 2024-02-11 RX ADMIN — Medication 3 MILLILITER(S): at 20:14

## 2024-02-11 RX ADMIN — MIDODRINE HYDROCHLORIDE 20 MILLIGRAM(S): 2.5 TABLET ORAL at 05:22

## 2024-02-11 RX ADMIN — MEROPENEM 100 MILLIGRAM(S): 1 INJECTION INTRAVENOUS at 21:20

## 2024-02-11 RX ADMIN — Medication 3 MILLILITER(S): at 12:40

## 2024-02-11 RX ADMIN — Medication 1 DROP(S): at 05:21

## 2024-02-11 RX ADMIN — MIDODRINE HYDROCHLORIDE 20 MILLIGRAM(S): 2.5 TABLET ORAL at 12:04

## 2024-02-11 RX ADMIN — Medication 2 SPRAY(S): at 14:17

## 2024-02-11 RX ADMIN — SODIUM CHLORIDE 500 MILLILITER(S): 9 INJECTION, SOLUTION INTRAVENOUS at 09:00

## 2024-02-11 RX ADMIN — ENOXAPARIN SODIUM 50 MILLIGRAM(S): 100 INJECTION SUBCUTANEOUS at 05:21

## 2024-02-11 NOTE — PROGRESS NOTE ADULT - SUBJECTIVE AND OBJECTIVE BOX
TEA ECHAVARRIA  58y Female    CHIEF COMPLAINT:    Patient is a 58y old  Female who presents with a chief complaint of Respiratory Distress (10 Feb 2024 08:03)    INTERVAL HPI/OVERNIGHT EVENTS:    Patient seen and examined. No acute events overnight. Remains critically ill on levophed, HFNC and NRB    ROS: All other systems are negative.    Vital Signs:    T(F): 99.8 (24 @ 00:00), Max: 100 (02-10-24 @ 16:00)  HR: 78 (24 @ 04:00) (60 - 96)  BP: 95/65 (24 @ 04:00) (89/51 - 107/55)  RR: 18 (24 @ 04:00) (18 - 20)  SpO2: 99% (24 @ 04:00) (92% - 100%)    10 Feb 2024 07:01  -  2024 07:00  --------------------------------------------------------  IN: 1558.7 mL / OUT: 850 mL / NET: 708.7 mL    Daily Weight in k.3 (2024 00:00)    PHYSICAL EXAM:    GENERAL:  NAD chronically ill appearing   SKIN: No rashes or lesions  HEENT: Atraumatic. Normocephalic.    NECK: Supple, No JVD.   PULMONARY: decreased breath sounds B/L. No wheezing   CVS: Normal S1, S2. Rate and Rhythm are regular.   ABDOMEN/GI: Soft, Nontender, Nondistended   MSK:  No clubbing or cyanosis   NEUROLOGIC: does not follow commands   PSYCH: lethargic     Consultant(s) Notes Reviewed:  [x ] YES  [ ] NO  Care Discussed with Consultants/Other Providers [ x] YES  [ ] NO    LABS:                        8.2    11.22 )-----------( 521      ( 2024 05:53 )             27.2     140  |  99  |  13  ----------------------------<  154<H>  4.5   |  30  |  0.5<L>    Ca    8.0<L>      2024 05:53  Mg     2.3         TPro  6.0  /  Alb  2.5<L>  /  TBili  0.2  /  DBili  x   /  AST  20  /  ALT  17  /  AlkPhos  82  -    Culture - Blood (collected 2024 11:57)  Source: .Blood None  Preliminary Report (10 Feb 2024 22:02):    No growth at 24 hours    Culture - Blood (collected 2024 11:57)  Source: .Blood None  Preliminary Report (10 Feb 2024 22:02):    No growth at 24 hours    RADIOLOGY & ADDITIONAL TESTS:  Imaging or report Personally Reviewed:  [x] YES  [ ] NO  EKG reviewed: [x] YES  [ ] NO    Medications:  Standing  albuterol/ipratropium for Nebulization 3 milliLiter(s) Nebulizer <User Schedule>  artificial  tears Solution 1 Drop(s) Both EYES two times a day  calcium carbonate   1250 mG (OsCal) 1 Tablet(s) Oral two times a day  caspofungin IVPB 50 milliGRAM(s) IV Intermittent every 24 hours  caspofungin IVPB      chlorhexidine 2% Cloths 1 Application(s) Topical daily  enoxaparin Injectable 50 milliGRAM(s) SubCutaneous every 12 hours  gabapentin 300 milliGRAM(s) Oral every 12 hours  levETIRAcetam  Solution 500 milliGRAM(s) Oral every 12 hours  melatonin 3 milliGRAM(s) Oral at bedtime  meropenem  IVPB 1000 milliGRAM(s) IV Intermittent every 8 hours  metoprolol tartrate 12.5 milliGRAM(s) Oral two times a day  midodrine. 20 milliGRAM(s) Oral three times a day  norepinephrine Infusion 0.05 MICROgram(s)/kG/Min IV Continuous <Continuous>  pantoprazole  Injectable 40 milliGRAM(s) IV Push every 24 hours  polyethylene glycol 3350 17 Gram(s) Oral at bedtime  raloxifene 60 milliGRAM(s) Oral daily  scopolamine 1 mG/72 Hr(s) Patch 1 Patch Transdermal every 72 hours  senna 2 Tablet(s) Oral at bedtime  silver sulfADIAZINE 1% Cream 1 Application(s) Topical two times a day  sodium chloride 0.65% Nasal 2 Spray(s) Both Nostrils three times a day    PRN Meds  acetaminophen     Tablet .. 650 milliGRAM(s) Oral once PRN  acetaminophen     Tablet .. 650 milliGRAM(s) Oral every 6 hours PRN  aluminum hydroxide/magnesium hydroxide/simethicone Suspension 30 milliLiter(s) Oral every 4 hours PRN  ondansetron Injectable 4 milliGRAM(s) IV Push every 8 hours PRN             TEA ECHAVARRIA  58y Female    CHIEF COMPLAINT:    Patient is a 58y old  Female who presents with a chief complaint of Respiratory Distress (10 Feb 2024 08:03)    INTERVAL HPI/OVERNIGHT EVENTS:    Patient seen and examined. No acute events overnight. Remains critically ill on levophed, HFNC and NRB    ROS: All other systems are negative.    Vital Signs:    T(F): 99.8 (24 @ 00:00), Max: 100 (02-10-24 @ 16:00)  HR: 78 (24 @ 04:00) (60 - 96)  BP: 95/65 (24 @ 04:00) (89/51 - 107/55)  RR: 18 (24 @ 04:00) (18 - 20)  SpO2: 99% (24 @ 04:00) (92% - 100%)    10 Feb 2024 07:01  -  2024 07:00  --------------------------------------------------------  IN: 1558.7 mL / OUT: 850 mL / NET: 708.7 mL    Daily Weight in k.3 (2024 00:00)    PHYSICAL EXAM:    GENERAL:  NAD chronically ill appearing   SKIN: No rashes or lesions  HEENT: Atraumatic. Normocephalic.    NECK: Supple, No JVD.   PULMONARY: decreased breath sounds B/L. No wheezing   CVS: Normal S1, S2. Rate and Rhythm are regular.   ABDOMEN/GI: Soft, Nontender, Nondistended   MSK:  No clubbing or cyanosis   NEUROLOGIC: does not follow commands, opens eyes spontaneously   PSYCH: lethargic     Consultant(s) Notes Reviewed:  [x ] YES  [ ] NO  Care Discussed with Consultants/Other Providers [ x] YES  [ ] NO    LABS:                        8.2    11. )-----------( 521      ( 2024 05:53 )             27.2     140  |  99  |  13  ----------------------------<  154<H>  4.5   |  30  |  0.5<L>    Ca    8.0<L>      2024 05:53  Mg     2.3     -    TPro  6.0  /  Alb  2.5<L>  /  TBili  0.2  /  DBili  x   /  AST  20  /  ALT  17  /  AlkPhos  82  02-    Culture - Blood (collected 2024 11:57)  Source: .Blood None  Preliminary Report (10 Feb 2024 22:02):    No growth at 24 hours    Culture - Blood (collected 2024 11:57)  Source: .Blood None  Preliminary Report (10 Feb 2024 22:02):    No growth at 24 hours    RADIOLOGY & ADDITIONAL TESTS:  Imaging or report Personally Reviewed:  [x] YES  [ ] NO  EKG reviewed: [x] YES  [ ] NO    Medications:  Standing  albuterol/ipratropium for Nebulization 3 milliLiter(s) Nebulizer <User Schedule>  artificial  tears Solution 1 Drop(s) Both EYES two times a day  calcium carbonate   1250 mG (OsCal) 1 Tablet(s) Oral two times a day  caspofungin IVPB 50 milliGRAM(s) IV Intermittent every 24 hours  caspofungin IVPB      chlorhexidine 2% Cloths 1 Application(s) Topical daily  enoxaparin Injectable 50 milliGRAM(s) SubCutaneous every 12 hours  gabapentin 300 milliGRAM(s) Oral every 12 hours  levETIRAcetam  Solution 500 milliGRAM(s) Oral every 12 hours  melatonin 3 milliGRAM(s) Oral at bedtime  meropenem  IVPB 1000 milliGRAM(s) IV Intermittent every 8 hours  metoprolol tartrate 12.5 milliGRAM(s) Oral two times a day  midodrine. 20 milliGRAM(s) Oral three times a day  norepinephrine Infusion 0.05 MICROgram(s)/kG/Min IV Continuous <Continuous>  pantoprazole  Injectable 40 milliGRAM(s) IV Push every 24 hours  polyethylene glycol 3350 17 Gram(s) Oral at bedtime  raloxifene 60 milliGRAM(s) Oral daily  scopolamine 1 mG/72 Hr(s) Patch 1 Patch Transdermal every 72 hours  senna 2 Tablet(s) Oral at bedtime  silver sulfADIAZINE 1% Cream 1 Application(s) Topical two times a day  sodium chloride 0.65% Nasal 2 Spray(s) Both Nostrils three times a day    PRN Meds  acetaminophen     Tablet .. 650 milliGRAM(s) Oral once PRN  acetaminophen     Tablet .. 650 milliGRAM(s) Oral every 6 hours PRN  aluminum hydroxide/magnesium hydroxide/simethicone Suspension 30 milliLiter(s) Oral every 4 hours PRN  ondansetron Injectable 4 milliGRAM(s) IV Push every 8 hours PRN

## 2024-02-11 NOTE — PROGRESS NOTE ADULT - ASSESSMENT
IMPRESSION:    Acute hypoxemic respiratory failure on HHFNC 90%  Likely aspiration pneumonia   Sepsis POA  Septic shock  on Levophed   Recurrent aspiration pneumonia / prior intubation  HO DVT on Eliquis   HO GI bleed  HO OM  HO recent duodenal perforation   HO polymicrobial bacteremia   H/o CP, DS  H/o seizures    PLAN:    CNS:  Avoid CNS Depressant, AED. MS at baseline.     HEENT: Oral care.     PULMONARY: HOB at 45 degrees.  Aspiration precaution. Wean FiO2 Keep spo2 more than 92%.  Daily CXR. Aggressive pulm toilet, frequent suctioning.  Might need MV.    CARDIOVASCULAR: Keep MAP more than 60. Avoid overload. Wean levophed as able. C/w midodrine 20mg.  Goal directed fluid resuscitation. IVC today bedside    GI: Protonix. NG  feeding. Speech and swallow recs noted. Might need PEG when more stable    INFECTIOUS DISEASE:   c/w Meropenem.  Finish empiric Caspo course 2/18. F/u blood Cx, serum galactomannan. Histo Ag negative,     HEMATOLOGICAL:  Lovenox therapeutic.  Monitor CBC     ENDOCRINE:  Follow up FS.  Insulin protocol if needed.    MUSCULOSKELETAL: Bedrest.  Off loading.  Wound care.      Prognosis very poor.    Palliative care following    SDU.

## 2024-02-11 NOTE — PROGRESS NOTE ADULT - SUBJECTIVE AND OBJECTIVE BOX
Patient is a 58y old  Female who presents with a chief complaint of Respiratory Distress (11 Feb 2024 08:08)        Over Night Events: No overnight events still on HFNC 60/90.         ROS:     All ROS are negative except HPI       PHYSICAL EXAM    ICU Vital Signs Last 24 Hrs  T(C): 36.4 (11 Feb 2024 08:00), Max: 37.8 (10 Feb 2024 16:00)  T(F): 97.5 (11 Feb 2024 08:00), Max: 100 (10 Feb 2024 16:00)  HR: 97 (11 Feb 2024 08:00) (60 - 97)  BP: 82/64 (11 Feb 2024 08:00) (82/64 - 107/55)  BP(mean): 70 (11 Feb 2024 08:00) (64 - 76)  ABP: --  ABP(mean): --  RR: 20 (11 Feb 2024 08:00) (18 - 20)  SpO2: 93% (11 Feb 2024 08:00) (92% - 100%)    O2 Parameters below as of 11 Feb 2024 08:00  Patient On (Oxygen Delivery Method): nasal cannula, high flow      CONSTITUTIONAL:  NAD    CARDIAC:   Normal rate,   Regular rhythm.    No edema    RESPIRATORY:   BL crackles     GASTROINTESTINAL:  Abdomen soft,   Non-tender,   No guarding,     NEUROLOGICAL:   Alert and oriented   No motor  deficits.    SKIN:   Sacral stage 1    02-10-24 @ 07:01  -  02-11-24 @ 07:00  --------------------------------------------------------  IN:    Enteral Tube Flush: 200 mL    Jevity 1.2: 1200 mL    Norepinephrine: 158.7 mL  Total IN: 1558.7 mL    OUT:    Voided (mL): 850 mL  Total OUT: 850 mL    Total NET: 708.7 mL      LABS:                           8.2    11.22 )-----------( 521      ( 11 Feb 2024 05:53 )             27.2                                               02-11    140  |  99  |  13  ----------------------------<  154<H>  4.5   |  30  |  0.5<L>    Ca    8.0<L>      11 Feb 2024 05:53  Mg     2.3     02-11    TPro  6.0  /  Alb  2.5<L>  /  TBili  0.2  /  DBili  x   /  AST  20  /  ALT  17  /  AlkPhos  82  02-11                                             Urinalysis Basic - ( 11 Feb 2024 05:53 )    Color: x / Appearance: x / SG: x / pH: x  Gluc: 154 mg/dL / Ketone: x  / Bili: x / Urobili: x   Blood: x / Protein: x / Nitrite: x   Leuk Esterase: x / RBC: x / WBC x   Sq Epi: x / Non Sq Epi: x / Bacteria: x                                                  LIVER FUNCTIONS - ( 11 Feb 2024 05:53 )  Alb: 2.5 g/dL / Pro: 6.0 g/dL / ALK PHOS: 82 U/L / ALT: 17 U/L / AST: 20 U/L / GGT: x                                                  Culture - Blood (collected 09 Feb 2024 11:57)  Source: .Blood None  Preliminary Report (10 Feb 2024 22:02):    No growth at 24 hours    Culture - Blood (collected 09 Feb 2024 11:57)  Source: .Blood None  Preliminary Report (10 Feb 2024 22:02):    No growth at 24 hours                                                                                       ABG - ( 10 Feb 2024 08:46 )  pH, Arterial: 7.49  pH, Blood: x     /  pCO2: 43    /  pO2: 154   / HCO3: 33    / Base Excess: 8.5   /  SaO2: 99.0                MEDICATIONS  (STANDING):  albuterol/ipratropium for Nebulization 3 milliLiter(s) Nebulizer <User Schedule>  artificial  tears Solution 1 Drop(s) Both EYES two times a day  calcium carbonate   1250 mG (OsCal) 1 Tablet(s) Oral two times a day  caspofungin IVPB      caspofungin IVPB 50 milliGRAM(s) IV Intermittent every 24 hours  chlorhexidine 2% Cloths 1 Application(s) Topical daily  enoxaparin Injectable 50 milliGRAM(s) SubCutaneous every 12 hours  gabapentin 300 milliGRAM(s) Oral every 12 hours  levETIRAcetam  Solution 500 milliGRAM(s) Oral every 12 hours  melatonin 3 milliGRAM(s) Oral at bedtime  meropenem  IVPB 1000 milliGRAM(s) IV Intermittent every 8 hours  metoprolol tartrate 12.5 milliGRAM(s) Oral two times a day  midodrine. 20 milliGRAM(s) Oral three times a day  norepinephrine Infusion 0.05 MICROgram(s)/kG/Min (4.9 mL/Hr) IV Continuous <Continuous>  pantoprazole  Injectable 40 milliGRAM(s) IV Push every 24 hours  polyethylene glycol 3350 17 Gram(s) Oral at bedtime  raloxifene 60 milliGRAM(s) Oral daily  scopolamine 1 mG/72 Hr(s) Patch 1 Patch Transdermal every 72 hours  senna 2 Tablet(s) Oral at bedtime  silver sulfADIAZINE 1% Cream 1 Application(s) Topical two times a day  sodium chloride 0.65% Nasal 2 Spray(s) Both Nostrils three times a day    MEDICATIONS  (PRN):  acetaminophen     Tablet .. 650 milliGRAM(s) Oral once PRN Temp greater or equal to 38.5C (101.3F)  acetaminophen     Tablet .. 650 milliGRAM(s) Oral every 6 hours PRN Temp greater or equal to 38C (100.4F), Mild Pain (1 - 3)  aluminum hydroxide/magnesium hydroxide/simethicone Suspension 30 milliLiter(s) Oral every 4 hours PRN Dyspepsia  ondansetron Injectable 4 milliGRAM(s) IV Push every 8 hours PRN Nausea and/or Vomiting      New X-rays reviewed:                                                                                  ECHO     Patient is a 58y old  Female who presents with a chief complaint of Respiratory Distress (11 Feb 2024 08:08)        Over Night Events: No overnight events still on HFNC 60/90.  CXR noted    PHYSICAL EXAM    ICU Vital Signs Last 24 Hrs  T(C): 36.4 (11 Feb 2024 08:00), Max: 37.8 (10 Feb 2024 16:00)  T(F): 97.5 (11 Feb 2024 08:00), Max: 100 (10 Feb 2024 16:00)  HR: 97 (11 Feb 2024 08:00) (60 - 97)  BP: 82/64 (11 Feb 2024 08:00) (82/64 - 107/55)  BP(mean): 70 (11 Feb 2024 08:00) (64 - 76)  RR: 20 (11 Feb 2024 08:00) (18 - 20)  SpO2: 93% (11 Feb 2024 08:00) (92% - 100%)    O2 Parameters below as of 11 Feb 2024 08:00  Patient On (Oxygen Delivery Method): nasal cannula, high flow      CONSTITUTIONAL:  ill looking    CARDIAC:   Normal rate,   Regular rhythm.    No edema    RESPIRATORY:   l side rhonchi    GASTROINTESTINAL:  Abdomen soft,   Non-tender,   No guarding,     NEUROLOGICAL:   Alert and oriented   No motor  deficits.    SKIN:   Sacral stage 1    02-10-24 @ 07:01  -  02-11-24 @ 07:00  --------------------------------------------------------  IN:    Enteral Tube Flush: 200 mL    Jevity 1.2: 1200 mL    Norepinephrine: 158.7 mL  Total IN: 1558.7 mL    OUT:    Voided (mL): 850 mL  Total OUT: 850 mL    Total NET: 708.7 mL      LABS:                           8.2    11.22 )-----------( 521      ( 11 Feb 2024 05:53 )             27.2                                               02-11    140  |  99  |  13  ----------------------------<  154<H>  4.5   |  30  |  0.5<L>    Ca    8.0<L>      11 Feb 2024 05:53  Mg     2.3     02-11    TPro  6.0  /  Alb  2.5<L>  /  TBili  0.2  /  DBili  x   /  AST  20  /  ALT  17  /  AlkPhos  82  02-11                                             Urinalysis Basic - ( 11 Feb 2024 05:53 )    Color: x / Appearance: x / SG: x / pH: x  Gluc: 154 mg/dL / Ketone: x  / Bili: x / Urobili: x   Blood: x / Protein: x / Nitrite: x   Leuk Esterase: x / RBC: x / WBC x   Sq Epi: x / Non Sq Epi: x / Bacteria: x                                                  LIVER FUNCTIONS - ( 11 Feb 2024 05:53 )  Alb: 2.5 g/dL / Pro: 6.0 g/dL / ALK PHOS: 82 U/L / ALT: 17 U/L / AST: 20 U/L / GGT: x                                                  Culture - Blood (collected 09 Feb 2024 11:57)  Source: .Blood None  Preliminary Report (10 Feb 2024 22:02):    No growth at 24 hours    Culture - Blood (collected 09 Feb 2024 11:57)  Source: .Blood None  Preliminary Report (10 Feb 2024 22:02):    No growth at 24 hours                                                                                       ABG - ( 10 Feb 2024 08:46 )  pH, Arterial: 7.49  pH, Blood: x     /  pCO2: 43    /  pO2: 154   / HCO3: 33    / Base Excess: 8.5   /  SaO2: 99.0                MEDICATIONS  (STANDING):  albuterol/ipratropium for Nebulization 3 milliLiter(s) Nebulizer <User Schedule>  artificial  tears Solution 1 Drop(s) Both EYES two times a day  calcium carbonate   1250 mG (OsCal) 1 Tablet(s) Oral two times a day  caspofungin IVPB      caspofungin IVPB 50 milliGRAM(s) IV Intermittent every 24 hours  chlorhexidine 2% Cloths 1 Application(s) Topical daily  enoxaparin Injectable 50 milliGRAM(s) SubCutaneous every 12 hours  gabapentin 300 milliGRAM(s) Oral every 12 hours  levETIRAcetam  Solution 500 milliGRAM(s) Oral every 12 hours  melatonin 3 milliGRAM(s) Oral at bedtime  meropenem  IVPB 1000 milliGRAM(s) IV Intermittent every 8 hours  metoprolol tartrate 12.5 milliGRAM(s) Oral two times a day  midodrine. 20 milliGRAM(s) Oral three times a day  norepinephrine Infusion 0.05 MICROgram(s)/kG/Min (4.9 mL/Hr) IV Continuous <Continuous>  pantoprazole  Injectable 40 milliGRAM(s) IV Push every 24 hours  polyethylene glycol 3350 17 Gram(s) Oral at bedtime  raloxifene 60 milliGRAM(s) Oral daily  scopolamine 1 mG/72 Hr(s) Patch 1 Patch Transdermal every 72 hours  senna 2 Tablet(s) Oral at bedtime  silver sulfADIAZINE 1% Cream 1 Application(s) Topical two times a day  sodium chloride 0.65% Nasal 2 Spray(s) Both Nostrils three times a day    MEDICATIONS  (PRN):  acetaminophen     Tablet .. 650 milliGRAM(s) Oral once PRN Temp greater or equal to 38.5C (101.3F)  acetaminophen     Tablet .. 650 milliGRAM(s) Oral every 6 hours PRN Temp greater or equal to 38C (100.4F), Mild Pain (1 - 3)  aluminum hydroxide/magnesium hydroxide/simethicone Suspension 30 milliLiter(s) Oral every 4 hours PRN Dyspepsia  ondansetron Injectable 4 milliGRAM(s) IV Push every 8 hours PRN Nausea and/or Vomiting

## 2024-02-11 NOTE — CHART NOTE - NSCHARTNOTEFT_GEN_A_CORE
Registered Dietitian Follow-Up     Patient Profile Reviewed                           Yes [x]   No []     Nutrition History Previously Obtained        Yes [x]  No []       Pertinent Subjective Information: The patient tolerates current TF regimen.      Pertinent Medical Interventions: 59y/o female with h/o Down Syndrome, nonverbal at baseline, Hypothyroidism, Cerebral palsy and Seizure Disorders presents to the ED from nursing facility/group home ([facility]) presented to ED for respiratory distress and high grade fever. Patient found to be septic on admission.Admitted to SDU for management of acute respiratory distress secondary to CAP and aspiration pneumonia. The patient developed dyspnea after swallow evaluation, was spiking high grade fever, consulted by pulmonary/critical care and was upgraded back to SDU. MAP-76.     Diet order: () TF with Jevity 1.2 at 300mL Q6hrs with water flush with 75mL AC/PC with No Carb Prosource TF once daily via NG Tube      Anthropometrics:  - Ht: 4'6"  - Wt: 52.3kg  - %wt change  - BMI: 28 (Overweight)   - IBW: 41kg     Pertinent Lab Data: () Na-140, K-4.5, CL-99, BUN-13, Cr-0.5, Glucose-154mg/dL, Corrected Ca-9.2 (WNL), H/H-8.2/27.2, MCV-79.3, WBC-11.22     Pertinent Meds: Lovenox, Cancidas, Levophed, Midodrine, Evista, Maalox, Zofran, Protonix, Miralax, Senna      Physical Findings:  - Appearance: Well nourished   - GI function: No bowel movement is noted  - Tubes: NG Tube   - Oral/Mouth cavity:  - Skin: DTI-Left; Stage II Pressure Injury-Bilateral Buttocks; Unstageable Pressure Injury-Left Heel (Calos Score-12)      Nutrition Requirements  Weight Used: 52.3kg -Derived from nutrition note ()      Estimated Energy Needs    Continue [x]  Adjust []  Adjusted Energy Recommendations: 1307-1493kcal/day (MSJ x 1.4-1.6) -Derived from nutrition note ()      Estimated Protein Needs    Continue [x]  Adjust []  Adjusted Protein Recommendations: 68-78gm/day (1.3-1.5gm/kg) -Derived from nutrition note ()       Estimated Fluid Needs        Continue [x]  Adjust []  Adjusted Fluid Recommendations: 1308-1569mL/day (25-30mL/kg) -Derived from nutrition note ()      Nutrient Intake: Current TF regimen provides (1440kcal, 67gm protein, 972mL free H2O), meeting (96% estimated calorie and 86% estimated protein needs).      Nutrition Diagnostic #1  Problem: Increased Nutrient Needs   Etiology: Wound Healing   Statement: Pressure Injury      Nutrition Diagnostic #2  Problem:  Etiology:  Statement:     Nutrition Intervention:  1.Enteral Nutrition  2.Vitamin Supplement     Goal/Expected Outcome:  1.Continue to meet >85% estimated nutritional needs in 3-5 days (High Risk)      Indicator/Monitorin.Diet order, energy intake, nutrition focused physical findings, glucose and anemia profile    -I tried to contact the patient's physician but was unsuccessful.     Recommendations:  1.Continue to provide TF with Jevity 1.2 at 300mL Q6hrs, providing (1440kcal, 67gm protein, 972mL free H2O)  2.Recommend provide a MVI with minerals once daily  3.Recommend provide Vitamin C (500mg) BID  4.Recommend provide Zinc Sulfate (220mg) once daily (14 days)

## 2024-02-11 NOTE — PROGRESS NOTE ADULT - ASSESSMENT
58F w/ PMHx Down Syndrome, nonverbal at baseline, Hypothyroidism, Cerebral palsy and Seizure Disorders presents to the ED from nursing facility/group home ([facility]) presented to ED for respiratory distress and high grade fever. Patient found to be septic on admission.Admitted to SDU for management of acute respiratory distress 2/2 CAP/Aspiration PNA (20 Jan 2024 18:22)  While pt was on the floor she developed dyspnea after swallow evaluation, was spiking high grade fever, consulted by pulmonary/critical care and was upgraded back to SDU.    Sepsis POA / Acute hypoxic resp failure / RSV bronchiolitis /  H/o Dysphagia/ suspected aspiration  - inflammatory markers elevated, RVP is negative, MRSA neg  - BCx (1/20): Staph hominis -  contaminant repeat BCx have been NGTD  - Failed FEES, s/p  NG tube for feedings and medications, patient will need PEG tube once improved. Recall GI once off HFNC   - Aspiration precautions, Chest PT vest , c/w duonebs  - ID is following, recommendations noted:   - c/w  Caspo 50mg q24h IV   - Repeat fungitell 63, indeterminate  - c/w meropenem 1g q8h IV  -  histoplasma Ab serum negative   - needs CT A/P once more stable   - on HFNC/NRB Keep spo2 more than 92%. May need intubation   - remains on levophed 0.07 and midodrine 20 Q8H     Hypernatremia - improved, cw free water to 250 Q4H     H/o hypotension  - Midodrine dose increased to 20 mg Q 8 hours    - keep MAP above 60, taper off levophed as able     Type 2 NSTEMI   - Elevated Trops and Tachycardia (sepsis)    - c/w telemetry monitoring   - Repeat EKG: Normal sinus rhythm  - c/w  metoprolol    Seizure Disorder  - c/w  Keppra   - seizure precautions  - keep Mg above 2.0     H/o lower ext DVT  - cw therapeutic Lovenox, holding Eliquis     Normocytic Anemia - stable    Down Syndrome/  Cerebral Palsy  - supportive care  - prevent falls and aspiration   - failed speech and swallow, needs PEG as above  - on Raloxifene     Full code, overall prognosis is very poor, continue monitoring in SDU     Patient seen at bedside, total time spent to evaluate and treat the patient's acute illness and chronic medical conditions as well as time spent reviewing labs, radiology, discussing medical plan with covering medical team was more than 50 minutes with >50% of time spent face to face with patient, discussing with patient/family as well as coordination of care            58F w/ PMHx Down Syndrome, nonverbal at baseline, Hypothyroidism, Cerebral palsy and Seizure Disorders presents to the ED from nursing facility/group home ([facility]) presented to ED for respiratory distress and high grade fever. Patient found to be septic on admission.Admitted to SDU for management of acute respiratory distress 2/2 CAP/Aspiration PNA (20 Jan 2024 18:22)  While pt was on the floor she developed dyspnea after swallow evaluation, was spiking high grade fever, consulted by pulmonary/critical care and was upgraded back to SDU.    Sepsis POA / Acute hypoxic resp failure / RSV bronchiolitis /  H/o Dysphagia/ suspected aspiration  - inflammatory markers elevated, RVP is negative, MRSA neg  - BCx (1/20): Staph hominis -  contaminant repeat BCx have been NGTD  - Failed FEES, s/p  NG tube for feedings and medications, patient will need PEG tube once improved. Recall GI once off HFNC   - Aspiration precautions, Chest PT vest , c/w duonebs  - ID is following, recommendations noted:   - c/w  Caspo 50mg q24h IV   - Repeat fungitell 63, indeterminate  - c/w meropenem 1g q8h IV  -  histoplasma Ab serum negative   - needs CT A/P once more stable   - on HFNC/NRB Keep spo2 more than 92%. May need intubation   - remains on levophed 0.07 and midodrine 20 Q8H   - has been receiving IVF boluses PRN     Hypernatremia - improved, cw free water to 250 Q4H     H/o hypotension  - Midodrine dose increased to 20 mg Q 8 hours    - keep MAP above 60, taper off levophed as able     Type 2 NSTEMI   - Elevated Trops and Tachycardia (sepsis)    - c/w telemetry monitoring   - Repeat EKG: Normal sinus rhythm  - c/w  metoprolol    Seizure Disorder  - c/w  Keppra   - seizure precautions  - keep Mg above 2.0     H/o lower ext DVT  - cw therapeutic Lovenox, holding Eliquis     Normocytic Anemia - stable    Down Syndrome/  Cerebral Palsy  - supportive care  - prevent falls and aspiration   - failed speech and swallow, needs PEG as above  - on Raloxifene     Full code, overall prognosis is very poor, continue monitoring in SDU     Patient seen at bedside, total time spent to evaluate and treat the patient's acute illness and chronic medical conditions as well as time spent reviewing labs, radiology, discussing medical plan with covering medical team was more than 50 minutes with >50% of time spent face to face with patient, discussing with patient/family as well as coordination of care

## 2024-02-12 LAB
ALBUMIN SERPL ELPH-MCNC: 2.7 G/DL — LOW (ref 3.5–5.2)
ALP SERPL-CCNC: 90 U/L — SIGNIFICANT CHANGE UP (ref 30–115)
ALT FLD-CCNC: 15 U/L — SIGNIFICANT CHANGE UP (ref 0–41)
ANION GAP SERPL CALC-SCNC: 10 MMOL/L — SIGNIFICANT CHANGE UP (ref 7–14)
AST SERPL-CCNC: 18 U/L — SIGNIFICANT CHANGE UP (ref 0–41)
BASOPHILS # BLD AUTO: 0.09 K/UL — SIGNIFICANT CHANGE UP (ref 0–0.2)
BASOPHILS NFR BLD AUTO: 0.7 % — SIGNIFICANT CHANGE UP (ref 0–1)
BILIRUB SERPL-MCNC: 0.2 MG/DL — SIGNIFICANT CHANGE UP (ref 0.2–1.2)
BUN SERPL-MCNC: 14 MG/DL — SIGNIFICANT CHANGE UP (ref 10–20)
CALCIUM SERPL-MCNC: 8.4 MG/DL — SIGNIFICANT CHANGE UP (ref 8.4–10.5)
CHLORIDE SERPL-SCNC: 100 MMOL/L — SIGNIFICANT CHANGE UP (ref 98–110)
CO2 SERPL-SCNC: 29 MMOL/L — SIGNIFICANT CHANGE UP (ref 17–32)
CREAT SERPL-MCNC: 0.5 MG/DL — LOW (ref 0.7–1.5)
EGFR: 109 ML/MIN/1.73M2 — SIGNIFICANT CHANGE UP
EOSINOPHIL # BLD AUTO: 0.45 K/UL — SIGNIFICANT CHANGE UP (ref 0–0.7)
EOSINOPHIL NFR BLD AUTO: 3.7 % — SIGNIFICANT CHANGE UP (ref 0–8)
GLUCOSE SERPL-MCNC: 217 MG/DL — HIGH (ref 70–99)
HCT VFR BLD CALC: 30.1 % — LOW (ref 37–47)
HGB BLD-MCNC: 9 G/DL — LOW (ref 12–16)
IMM GRANULOCYTES NFR BLD AUTO: 0.7 % — HIGH (ref 0.1–0.3)
LYMPHOCYTES # BLD AUTO: 1.78 K/UL — SIGNIFICANT CHANGE UP (ref 1.2–3.4)
LYMPHOCYTES # BLD AUTO: 14.5 % — LOW (ref 20.5–51.1)
MAGNESIUM SERPL-MCNC: 2.5 MG/DL — HIGH (ref 1.8–2.4)
MCHC RBC-ENTMCNC: 23.7 PG — LOW (ref 27–31)
MCHC RBC-ENTMCNC: 29.9 G/DL — LOW (ref 32–37)
MCV RBC AUTO: 79.2 FL — LOW (ref 81–99)
MONOCYTES # BLD AUTO: 0.79 K/UL — HIGH (ref 0.1–0.6)
MONOCYTES NFR BLD AUTO: 6.4 % — SIGNIFICANT CHANGE UP (ref 1.7–9.3)
MRSA PCR RESULT.: NEGATIVE — SIGNIFICANT CHANGE UP
NEUTROPHILS # BLD AUTO: 9.08 K/UL — HIGH (ref 1.4–6.5)
NEUTROPHILS NFR BLD AUTO: 74 % — SIGNIFICANT CHANGE UP (ref 42.2–75.2)
NRBC # BLD: 0 /100 WBCS — SIGNIFICANT CHANGE UP (ref 0–0)
PLATELET # BLD AUTO: 558 K/UL — HIGH (ref 130–400)
PMV BLD: 11.2 FL — HIGH (ref 7.4–10.4)
POTASSIUM SERPL-MCNC: 5 MMOL/L — SIGNIFICANT CHANGE UP (ref 3.5–5)
POTASSIUM SERPL-SCNC: 5 MMOL/L — SIGNIFICANT CHANGE UP (ref 3.5–5)
PROT SERPL-MCNC: 6.1 G/DL — SIGNIFICANT CHANGE UP (ref 6–8)
RBC # BLD: 3.8 M/UL — LOW (ref 4.2–5.4)
RBC # FLD: 21 % — HIGH (ref 11.5–14.5)
SODIUM SERPL-SCNC: 139 MMOL/L — SIGNIFICANT CHANGE UP (ref 135–146)
WBC # BLD: 12.27 K/UL — HIGH (ref 4.8–10.8)
WBC # FLD AUTO: 12.27 K/UL — HIGH (ref 4.8–10.8)

## 2024-02-12 PROCEDURE — 99291 CRITICAL CARE FIRST HOUR: CPT

## 2024-02-12 PROCEDURE — 99233 SBSQ HOSP IP/OBS HIGH 50: CPT

## 2024-02-12 PROCEDURE — 71045 X-RAY EXAM CHEST 1 VIEW: CPT | Mod: 26

## 2024-02-12 PROCEDURE — 71045 X-RAY EXAM CHEST 1 VIEW: CPT | Mod: 26,77

## 2024-02-12 RX ORDER — NOREPINEPHRINE BITARTRATE/D5W 8 MG/250ML
0.05 PLASTIC BAG, INJECTION (ML) INTRAVENOUS
Qty: 8 | Refills: 0 | Status: DISCONTINUED | OUTPATIENT
Start: 2024-02-12 | End: 2024-02-14

## 2024-02-12 RX ORDER — SODIUM CHLORIDE 9 MG/ML
4 INJECTION INTRAMUSCULAR; INTRAVENOUS; SUBCUTANEOUS EVERY 4 HOURS
Refills: 0 | Status: DISCONTINUED | OUTPATIENT
Start: 2024-02-12 | End: 2024-02-23

## 2024-02-12 RX ORDER — ACETYLCYSTEINE 200 MG/ML
4 VIAL (ML) MISCELLANEOUS EVERY 4 HOURS
Refills: 0 | Status: DISCONTINUED | OUTPATIENT
Start: 2024-02-12 | End: 2024-02-23

## 2024-02-12 RX ORDER — VANCOMYCIN HCL 1 G
750 VIAL (EA) INTRAVENOUS ONCE
Refills: 0 | Status: COMPLETED | OUTPATIENT
Start: 2024-02-12 | End: 2024-02-12

## 2024-02-12 RX ORDER — IPRATROPIUM/ALBUTEROL SULFATE 18-103MCG
3 AEROSOL WITH ADAPTER (GRAM) INHALATION EVERY 4 HOURS
Refills: 0 | Status: DISCONTINUED | OUTPATIENT
Start: 2024-02-12 | End: 2024-02-23

## 2024-02-12 RX ADMIN — CASPOFUNGIN ACETATE 260 MILLIGRAM(S): 7 INJECTION, POWDER, LYOPHILIZED, FOR SOLUTION INTRAVENOUS at 11:41

## 2024-02-12 RX ADMIN — MEROPENEM 100 MILLIGRAM(S): 1 INJECTION INTRAVENOUS at 13:04

## 2024-02-12 RX ADMIN — MEROPENEM 100 MILLIGRAM(S): 1 INJECTION INTRAVENOUS at 05:23

## 2024-02-12 RX ADMIN — SODIUM CHLORIDE 4 MILLILITER(S): 9 INJECTION INTRAMUSCULAR; INTRAVENOUS; SUBCUTANEOUS at 16:20

## 2024-02-12 RX ADMIN — SCOPALAMINE 1 PATCH: 1 PATCH, EXTENDED RELEASE TRANSDERMAL at 17:26

## 2024-02-12 RX ADMIN — Medication 1 APPLICATION(S): at 17:25

## 2024-02-12 RX ADMIN — SENNA PLUS 2 TABLET(S): 8.6 TABLET ORAL at 21:07

## 2024-02-12 RX ADMIN — Medication 2 SPRAY(S): at 21:07

## 2024-02-12 RX ADMIN — Medication 1 TABLET(S): at 05:20

## 2024-02-12 RX ADMIN — SODIUM CHLORIDE 4 MILLILITER(S): 9 INJECTION INTRAMUSCULAR; INTRAVENOUS; SUBCUTANEOUS at 13:15

## 2024-02-12 RX ADMIN — MIDODRINE HYDROCHLORIDE 20 MILLIGRAM(S): 2.5 TABLET ORAL at 11:42

## 2024-02-12 RX ADMIN — Medication 2 SPRAY(S): at 05:22

## 2024-02-12 RX ADMIN — Medication 250 MILLIGRAM(S): at 11:41

## 2024-02-12 RX ADMIN — MIDODRINE HYDROCHLORIDE 20 MILLIGRAM(S): 2.5 TABLET ORAL at 17:25

## 2024-02-12 RX ADMIN — Medication 650 MILLIGRAM(S): at 17:54

## 2024-02-12 RX ADMIN — Medication 2 SPRAY(S): at 13:04

## 2024-02-12 RX ADMIN — RALOXIFENE HYDROCHLORIDE 60 MILLIGRAM(S): 60 TABLET, COATED ORAL at 11:42

## 2024-02-12 RX ADMIN — Medication 3 MILLILITER(S): at 08:29

## 2024-02-12 RX ADMIN — ENOXAPARIN SODIUM 50 MILLIGRAM(S): 100 INJECTION SUBCUTANEOUS at 05:20

## 2024-02-12 RX ADMIN — Medication 3 MILLILITER(S): at 20:05

## 2024-02-12 RX ADMIN — Medication 1 APPLICATION(S): at 05:22

## 2024-02-12 RX ADMIN — LEVETIRACETAM 500 MILLIGRAM(S): 250 TABLET, FILM COATED ORAL at 17:25

## 2024-02-12 RX ADMIN — MIDODRINE HYDROCHLORIDE 20 MILLIGRAM(S): 2.5 TABLET ORAL at 05:20

## 2024-02-12 RX ADMIN — POLYETHYLENE GLYCOL 3350 17 GRAM(S): 17 POWDER, FOR SOLUTION ORAL at 21:07

## 2024-02-12 RX ADMIN — Medication 1 DROP(S): at 05:22

## 2024-02-12 RX ADMIN — Medication 4 MILLILITER(S): at 13:14

## 2024-02-12 RX ADMIN — ENOXAPARIN SODIUM 50 MILLIGRAM(S): 100 INJECTION SUBCUTANEOUS at 17:24

## 2024-02-12 RX ADMIN — Medication 3 MILLIGRAM(S): at 21:07

## 2024-02-12 RX ADMIN — CHLORHEXIDINE GLUCONATE 1 APPLICATION(S): 213 SOLUTION TOPICAL at 11:43

## 2024-02-12 RX ADMIN — Medication 650 MILLIGRAM(S): at 17:42

## 2024-02-12 RX ADMIN — MEROPENEM 100 MILLIGRAM(S): 1 INJECTION INTRAVENOUS at 21:06

## 2024-02-12 RX ADMIN — Medication 650 MILLIGRAM(S): at 00:23

## 2024-02-12 RX ADMIN — LEVETIRACETAM 500 MILLIGRAM(S): 250 TABLET, FILM COATED ORAL at 05:20

## 2024-02-12 RX ADMIN — Medication 3 MILLILITER(S): at 13:12

## 2024-02-12 RX ADMIN — GABAPENTIN 300 MILLIGRAM(S): 400 CAPSULE ORAL at 05:20

## 2024-02-12 RX ADMIN — Medication 4 MILLILITER(S): at 16:19

## 2024-02-12 RX ADMIN — PANTOPRAZOLE SODIUM 40 MILLIGRAM(S): 20 TABLET, DELAYED RELEASE ORAL at 05:19

## 2024-02-12 RX ADMIN — Medication 3 MILLILITER(S): at 16:19

## 2024-02-12 RX ADMIN — GABAPENTIN 300 MILLIGRAM(S): 400 CAPSULE ORAL at 17:25

## 2024-02-12 RX ADMIN — Medication 1 TABLET(S): at 17:26

## 2024-02-12 NOTE — PROGRESS NOTE ADULT - ATTENDING COMMENTS
IMPRESSION:    Acute hypoxemic respiratory failure  Likely aspiration pneumonia   Sepsis POA  Septic shock  on Levophed   Recurrent aspiration pneumonia / prior intubation  HO DVT on Eliquis   HO GI bleed  HO OM  HO recent duodenal perforation   HO polymicrobial bacteremia   H/o CP, DS  H/o seizures    Plan as outlined above

## 2024-02-12 NOTE — PROGRESS NOTE ADULT - ASSESSMENT
58F with Down syndrome, nonverbal at baseline, hypothyroidism, CP, and seizure disorder here from [facility] with fever and respiratory distress. Found to be septic on admission, from CAP/aspiration PNA, on IV vanco and meropenem, with course c/b continued fevers, and bacteremia with S. hominis. Also failed FEES, has NGT in place and will likely need PEG. Is requiring HFNC alternating with BiPAP. Is also on midodrine for hypotension. Patient is full code. Of note patient is under [place] [facility]. Palliative care consulted for [facility].

## 2024-02-12 NOTE — PROGRESS NOTE ADULT - ASSESSMENT
IMPRESSION:    Acute hypoxemic respiratory failure on HHFNC 90%  Likely aspiration pneumonia   Sepsis POA  Septic shock  on Levophed   Recurrent aspiration pneumonia / prior intubation  HO DVT on Eliquis   HO GI bleed  HO OM  HO recent duodenal perforation   HO polymicrobial bacteremia   H/o CP, DS  H/o seizures    PLAN:    CNS:  Avoid CNS Depressant, AED. MS at baseline.     HEENT: Oral care.     PULMONARY: HOB at 45 degrees.  Aspiration precaution. Wean FiO2 Keep spo2 more than 92%.  Daily CXR. Aggressive pulm toilet, frequent suctioning.  Might need MV. Chest vest, bedside lung US today     CARDIOVASCULAR: Keep MAP more than 60. Avoid overload. Wean levophed as able. C/w midodrine 20mg.  Goal directed fluid resuscitation. IVC today bedside    GI: Protonix. NG  feeding. Speech and swallow recs noted. Might need PEG when more stable    INFECTIOUS DISEASE:   c/w Meropenem.  Finish empiric Caspo course 2/18. F/u blood Cx, serum galactomannan. Histo Ag negative, give vanc 1 dose f/u ID    HEMATOLOGICAL:  Lovenox therapeutic.  Monitor CBC     ENDOCRINE:  Follow up FS.  Insulin protocol if needed.    MUSCULOSKELETAL: Bedrest.  Off loading.  Wound care.      Prognosis very poor.    Palliative care following    SDU. IMPRESSION:    Acute hypoxemic respiratory failure  Likely aspiration pneumonia   Sepsis POA  Septic shock  on Levophed   Recurrent aspiration pneumonia / prior intubation  HO DVT on Eliquis   HO GI bleed  HO OM  HO recent duodenal perforation   HO polymicrobial bacteremia   H/o CP, DS  H/o seizures    PLAN:    CNS:  Avoid CNS Depressant, AED. MS at baseline.     HEENT: Oral care.     PULMONARY: HOB at 45 degrees.  Aspiration precaution. Wean FiO2 Keep spo2 more than 92%.  Daily CXR. Aggressive pulm toilet, frequent suctioning.  Might need MV. Chest vest, bedside lung US with no pleural effusion     CARDIOVASCULAR: Keep MAP more than 60. Avoid overload. Wean levophed as able. C/w midodrine 20mg.  Goal directed fluid resuscitation. IVC today bedside    GI: Protonix. NG  feeding. Speech and swallow recs noted. Might need PEG when more stable    INFECTIOUS DISEASE:   c/w Meropenem.  Finish empiric Caspo course 2/18. F/u blood Cx, serum galactomannan. Histo Ag negative,    HEMATOLOGICAL:  Lovenox therapeutic.  Monitor CBC     ENDOCRINE:  Follow up FS.  Insulin protocol if needed.    MUSCULOSKELETAL: Bedrest.  Off loading.  Wound care.      Prognosis very poor.    Palliative care following    SDU.

## 2024-02-12 NOTE — PROGRESS NOTE ADULT - SUBJECTIVE AND OBJECTIVE BOX
Subjective:    Today is hospital day 23d. This morning patient was seen and examined at bedside, resting comfortably in bed.    No acute or major events overnight.      PAST MEDICAL & SURGICAL HISTORY  Down syndrome    Osteoporosis    Mild anemia    Neuropathy    S/P debridement  of R hip on 3/2/21    ALLERGIES:  No Known Allergies    MEDICATIONS:  STANDING MEDICATIONS  acetylcysteine 20%  Inhalation 4 milliLiter(s) Inhalation every 4 hours  albuterol/ipratropium for Nebulization 3 milliLiter(s) Nebulizer every 4 hours  artificial  tears Solution 1 Drop(s) Both EYES two times a day  calcium carbonate   1250 mG (OsCal) 1 Tablet(s) Oral two times a day  caspofungin IVPB 50 milliGRAM(s) IV Intermittent every 24 hours  caspofungin IVPB      chlorhexidine 2% Cloths 1 Application(s) Topical daily  enoxaparin Injectable 50 milliGRAM(s) SubCutaneous every 12 hours  gabapentin 300 milliGRAM(s) Oral every 12 hours  levETIRAcetam  Solution 500 milliGRAM(s) Oral every 12 hours  melatonin 3 milliGRAM(s) Oral at bedtime  meropenem  IVPB 1000 milliGRAM(s) IV Intermittent every 8 hours  midodrine. 20 milliGRAM(s) Oral three times a day  pantoprazole  Injectable 40 milliGRAM(s) IV Push every 24 hours  polyethylene glycol 3350 17 Gram(s) Oral at bedtime  raloxifene 60 milliGRAM(s) Oral daily  scopolamine 1 mG/72 Hr(s) Patch 1 Patch Transdermal every 72 hours  senna 2 Tablet(s) Oral at bedtime  silver sulfADIAZINE 1% Cream 1 Application(s) Topical two times a day  sodium chloride 0.65% Nasal 2 Spray(s) Both Nostrils three times a day  sodium chloride 3%  Inhalation 4 milliLiter(s) Inhalation every 4 hours    PRN MEDICATIONS  acetaminophen     Tablet .. 650 milliGRAM(s) Oral once PRN  acetaminophen     Tablet .. 650 milliGRAM(s) Oral every 6 hours PRN  aluminum hydroxide/magnesium hydroxide/simethicone Suspension 30 milliLiter(s) Oral every 4 hours PRN  ondansetron Injectable 4 milliGRAM(s) IV Push every 8 hours PRN      Objective:  VITALS:   T(F): 99.4  HR: 78  BP: 111/57  RR: 20  SpO2: 95%    PHYSICAL EXAM:  GENERAL:  NAD chronically ill appearing, pale   PULMONARY: decreased breath sounds B/L. No wheezing.   CVS: Normal S1, S2. Rate and Rhythm are regular.   ABDOMEN/GI: Soft, Nontender, mildly distended   NEUROLOGIC: opens eyes at times, does not follow commands   PSYCH: lethargic   NO LLE      LABS:  LABS FOR NEXT DAY : Yes (x  ) No (  )                        9.0    12.27 )-----------( 558      ( 12 Feb 2024 06:37 )             30.1     02-12    139  |  100  |  14  ----------------------------<  217<H>  5.0   |  29  |  0.5<L>    Ca    8.4      12 Feb 2024 06:37  Mg     2.5     02-12    TPro  6.1  /  Alb  2.7<L>  /  TBili  0.2  /  DBili  x   /  AST  18  /  ALT  15  /  AlkPhos  90  02-12

## 2024-02-12 NOTE — SWALLOW BEDSIDE ASSESSMENT ADULT - SWALLOW EVAL: RECOMMENDED DIET
continue NPO w/ non-oral means of nutrition/hydration continue NPO w/ NGT; pt would likely benefit from long-term non-oral means of nutrition/hydration if this aligns w/ GOC

## 2024-02-12 NOTE — SWALLOW BEDSIDE ASSESSMENT ADULT - COMMENTS
Pt s/p FEES 1/30, recs for NPO w/ NGT.  CXR 2/12-> ncreased opacification left lung. Increasing right basilar opacity.  GI following for possible g-tube placement, "paper work to get the consent for PEG tube initiated"

## 2024-02-12 NOTE — PROGRESS NOTE ADULT - SUBJECTIVE AND OBJECTIVE BOX
HPI: 58F with Down syndrome, nonverbal at baseline, hypothyroidism, CP, and seizure disorder here from [facility] with fever and respiratory distress. Found to be septic on admission, from CAP/aspiration PNA, on IV vanco and meropenem, with course c/b continued fevers, and bacteremia with S. hominis. Also failed FEES, has NGT in place and will likely need PEG. Is requiring HFNC alternating with BiPAP. Is also on midodrine for hypotension. Patient is full code. Of note patient is under [employer]. Palliative care consulted for [facility].    INTERVAL EVENTS  2/6/24: patient appears comfortable  2/8/24: patient appears comfortable, on HFNC and pressors  2/9/24: patient comfortable resting, on HFNC, off pressors  2/12/24: patient on HFNC, on vanco, danna, appears comfortable    ADVANCE DIRECTIVES:    [X ] Full Code [ ] DNR  MOLST  [ ]  Living Will  [ ]   DECISION MAKER(s):  [ ] Health Care Proxy(s)  [ ] Surrogate(s)  [ ] Guardian           Name(s): Phone Number(s): [facility]    BASELINE (I)ADL(s) (prior to admission):  [place]: [ ]Total  [ ] Moderate [ ]Dependent  Palliative Performance Status Version 2:         %    http://npcrc.org/files/news/palliative_performance_scale_ppsv2.pdf    MEDICATIONS  (STANDING):  acetylcysteine 20%  Inhalation 4 milliLiter(s) Inhalation every 4 hours  albuterol/ipratropium for Nebulization 3 milliLiter(s) Nebulizer every 4 hours  artificial  tears Solution 1 Drop(s) Both EYES two times a day  calcium carbonate   1250 mG (OsCal) 1 Tablet(s) Oral two times a day  caspofungin IVPB 50 milliGRAM(s) IV Intermittent every 24 hours  caspofungin IVPB      chlorhexidine 2% Cloths 1 Application(s) Topical daily  enoxaparin Injectable 50 milliGRAM(s) SubCutaneous every 12 hours  gabapentin 300 milliGRAM(s) Oral every 12 hours  levETIRAcetam  Solution 500 milliGRAM(s) Oral every 12 hours  melatonin 3 milliGRAM(s) Oral at bedtime  meropenem  IVPB 1000 milliGRAM(s) IV Intermittent every 8 hours  midodrine. 20 milliGRAM(s) Oral three times a day  pantoprazole  Injectable 40 milliGRAM(s) IV Push every 24 hours  polyethylene glycol 3350 17 Gram(s) Oral at bedtime  raloxifene 60 milliGRAM(s) Oral daily  scopolamine 1 mG/72 Hr(s) Patch 1 Patch Transdermal every 72 hours  senna 2 Tablet(s) Oral at bedtime  silver sulfADIAZINE 1% Cream 1 Application(s) Topical two times a day  sodium chloride 0.65% Nasal 2 Spray(s) Both Nostrils three times a day  sodium chloride 3%  Inhalation 4 milliLiter(s) Inhalation every 4 hours    MEDICATIONS  (PRN):  acetaminophen     Tablet .. 650 milliGRAM(s) Oral once PRN Temp greater or equal to 38.5C (101.3F)  acetaminophen     Tablet .. 650 milliGRAM(s) Oral every 6 hours PRN Temp greater or equal to 38C (100.4F), Mild Pain (1 - 3)  aluminum hydroxide/magnesium hydroxide/simethicone Suspension 30 milliLiter(s) Oral every 4 hours PRN Dyspepsia  ondansetron Injectable 4 milliGRAM(s) IV Push every 8 hours PRN Nausea and/or Vomiting  Allergies    No Known Allergies    Intolerances      PRESENT SYMPTOMS: [X ]Unable to obtain due to poor mentation   Source if other than patient:  [ ]Family   [ ]Team     Pain: [ ]yes [ ]no  QOL impact -   Location -                    Aggravating factors -  Quality -  Radiation -  Timing-  Severity (0-10 scale):  Minimal acceptable level (0-10 scale):     CPOT:    https://www.[facility].org/getattachment/qxe24m92-4q6s-7p0t-8i0q-0095t9891d6v/Critical-Care-Pain-Observation-Tool-(CPOT)    PAIN AD Score: 0  http://geriatrictoolkit.[place].[email or web address]/painad.pdf (press ctrl +  left click to view)    Dyspnea:                           [ ]None[ ]Mild [ ]Moderate [ ]Severe     Respiratory Distress Observation Scale (RDOS): 0  A score of 0 to 2 signifies little or no respiratory distress, 3 signifies mild distress, scores 4 to 6 indicate moderate distress, and scores greater than 7 signify severe distress  https://[email or web address].ca/sites/default/files/PDFS/131788-gucqgowjbem-hcramogu-omhqvgnlkvz-dapuc.pdf    Anxiety:                             [ ]None[ ]Mild [ ]Moderate [ ]Severe   Fatigue:                             [ ]None[ ]Mild [ ]Moderate [ ]Severe   Nausea:                             [ ]None[ ]Mild [ ]Moderate [ ]Severe   Loss of appetite:              [ ]None[ ]Mild [ ]Moderate [ ]Severe   Constipation:                    [ ]None[ ]Mild [ ]Moderate [ ]Severe    Other Symptoms:  [ ]All other review of systems negative     Palliative Performance Status Version 2:         %    http://npcrc.org/files/news/palliative_performance_scale_ppsv2.pdf  PHYSICAL EXAM:  Vital Signs Last 24 Hrs  T(C): 37.4 (12 Feb 2024 12:00), Max: 38 (12 Feb 2024 00:00)  T(F): 99.4 (12 Feb 2024 12:00), Max: 100.4 (12 Feb 2024 00:00)  HR: 78 (12 Feb 2024 12:00) (59 - 105)  BP: 111/57 (12 Feb 2024 12:00) (90/53 - 142/64)  BP(mean): 78 (12 Feb 2024 12:00) (66 - 92)  RR: 20 (12 Feb 2024 12:00) (18 - 20)  SpO2: 95% (12 Feb 2024 12:00) (95% - 100%)    Parameters below as of 12 Feb 2024 12:00  Patient On (Oxygen Delivery Method): nasal cannula, high flow        GENERAL:  [X ] No acute distress [ ]Lethargic  [ ]Unarousable  [ ]Verbal  [ ]Non-Verbal [ ]Cachexia    BEHAVIORAL/PSYCH:  [ ]Alert and Oriented x  [ ] Anxiety [ ] Delirium [ ] Agitation [X ] Calm   EYES: [ X] No scleral icterus [ ] Scleral icterus [ ] Closed  ENMT:  [ ]Dry mouth  [ ]No external oral lesions [ X] No external ear or nose lesions  CARDIOVASCULAR:  [ ]Regular [ ]Irregular [ ]Tachy [ ]Not Tachy  [ ]Raheem [ ] Edema [ ] No edema  PULMONARY:  [ ]Tachypnea  [ ]Audible excessive secretions [X ] No labored breathing [ ] labored breathing  GASTROINTESTINAL: [ ]Soft  [ ]Distended  [ X]Not distended [ ]Non tender [ ]Tender  MUSCULOSKELETAL: [ ]No clubbing [ ] clubbing  [ X] No cyanosis [ ] cyanosis  NEUROLOGIC: [ ]No focal deficits  [ ]Follows commands  [ ]Does not follow commands  [X ]Cognitive impairment  [ ]Dysphagia  [ ]Dysarthria  [ ]Paresis   SKIN: [ ] Jaundiced [X ] Non-jaundiced [ ]Rash [ ]No Rash [ ] Warm [ ] Dry  MISC/LINES: [ ] ET tube [ ] Trach [ ]NGT/OGT [ ]PEG [ ][place]en    CRITICAL CARE:  [ ] Shock Present  [ ]Septic [ ]Cardiogenic [ ]Neurologic [ ]Hypovolemic  [ ]  Vasopressors [ ]  Inotropes   [ ]Respiratory failure present [ ]Mechanical ventilation [ ]Non-invasive ventilatory support [ ]High flow  [ ]Acute  [ ]Chronic [ ]Hypoxic  [ ]Hypercarbic [ ]Other  [ ]Other organ failure     LABS: reviewed by me                          9.0    12.27 )-----------( 558      ( 12 Feb 2024 06:37 )             30.1     02-12    139  |  100  |  14  ----------------------------<  217<H>  5.0   |  29  |  0.5<L>    Ca    8.4      12 Feb 2024 06:37  Mg     2.5     02-12        RADIOLOGY & ADDITIONAL STUDIES: reviewed by m    CXR 2/5/24    Support devices: Enteric tube satisfactory position.    Cardiac/ Mediastinum: unremarkable    Lungs/ Pleura: Diminishing left lung opacity/effusion. Stable smaller   right basilar opacity. No pneumothorax.    Skeletal/ soft tissues: Stable    PROTEIN CALORIE MALNUTRITION PRESENT: [ ]mild [ ]moderate [ ]severe [ ]underweight [ ]morbid obesity  https://www.andeal.org/vault/2440/web/files/ONC/Table_Clinical%20Characteristics%20to%20Document%20Malnutrition-White%20JV%20et%20al%202012.pdf    Height (cm): 136.4 (01-24-24 @ 09:52), 154.9 (11-17-23 @ 15:00), 145 (10-02-23 @ 12:00)  Weight (kg): 52.3 (01-21-24 @ 08:00), 60 (11-17-23 @ 15:00), 55.3 (10-02-23 @ 12:00)  BMI (kg/m2): 28.1 (01-24-24 @ 09:52), 21.8 (01-21-24 @ 08:00), 25 (11-17-23 @ 15:00)    [ ]PPSV2 < or = to 30% [ ]significant weight loss  [ ]poor nutritional intake  [ ]anasarca      [ ]Artificial Nutrition          Palliative Care Spiritual/Emotional Screening Tool Question  Severity (0-4):                    OR                    [X ] Unable to determine/NA  Score of 2 or greater indicates recommendation of Chaplaincy referral  Chaplaincy Referral: [ ] Yes [ ] Refused [ ] Following     Caregiver [place]:  [ ] Yes [ ] No    OR    [x ] Unable to determine. Will assess at later time if appropriate.  Social Work Referral [ ]  Patient and Family Centered Care Referral [ ]    Anticipatory Grief Present: [ ] Yes [ ] No    OR     [ x] Unable to determine. Will assess at later time if appropriate.  Social Work Referral [ ]  Patient and Family Centered Care Referral [ ]    REFERRALS:   [ ]Chaplaincy  [ ]Hospice  [ ]Child Life  [ ]Social Work  [ ]Case management [ ]Holistic Therapy     Palliative care education provided to patient and/or family    Goals of Care Document:     ______________  Wu Jenkins MD  Palliative Medicine  [facility]   of Geriatric and Palliative Medicine  [phone]

## 2024-02-12 NOTE — PROGRESS NOTE ADULT - ASSESSMENT
ASSESSMENT  58F w/ PMHx Down Syndrome, nonverbal at baseline, Hypothyroidism, Cerebral palsy and Seizure Disorders presents to the ED from nursing facility/group home presented to ED for respiratory distress and high grade fever.     IMPRESSION  #Fevers, resolving   Unclear source , fever curve and WBC downtrending with caspofungin, unclear source    2/9 BCX NGTD x2    2/3 BCX NGTD     2/1 BCX NGTD     2/1 UCX   >=3 organisms. Probable collection contamination.    1/31 BCX NG    Rapid RVP Result: NotDetec (02-04-24 @ 10:28)    MRSA PCR Result.: Negative (02-03-24 @ 21:32)     UA without significant pyuria   Procalcitonin, Serum: 0.22 (02-01-24 @ 16:00)--> Procalcitonin, Serum: 0.15 (02-03-24 @ 11:13), downtrending ; unremarkable   MRSA PCR Result.: Negative (01-22-24 @ 05:30)  < from: Xray Chest 1 View-PORTABLE IMMEDIATE (Xray Chest 1 View-PORTABLE IMMEDIATE .) (02.01.24 @ 03:02) >  Bibasal opacities without significant change.   #Acute hypoxic respiratory failure- Gram Negative pneumonia   #1/20 BCX 1/4 bottles : Staphylococcus hominis- contaminant   Repeat CX NG   #Severe Sepsis on admission  #RSV + 1/21  #Elevated fungitell   Fungitell: 63 (02-06-24 @ 11:27)  Fungitell: 325 (01-20-24 @ 21:23)  Fungitell: 138 (11-07-23 @ 08:08)  Fungitell: 115 (11-02-23 @ 11:40)  Fungitell: >500 pg/mL (10.17.23 @ 17:20) s/p empiric caspo   #Full thickness ulcer right heel- Appreciate burn/podiatry evaluation.  #History of Right planter foot ulcers - two full thickness ulcers - serous drainage with mild erythema with OM  - MR Foot No Cont, Right (10.16.23 @ 21:51): IMPRESSION: 1.  Limited exam. 2.  Osteomyelitis of the first metatarsal stump. 3.  Osteomyelitis of the second toe distal phalanx.  - s/p excidional debridement to and including bone 1st metatarsal with partial 2nd digit amputation - 1st metatarsal head resected - Wound Cx Proteus ESBL   #CKD 2-3 Creatinine: 0.7 mg/dL (02.02.24 @ 04:59)      #History of buttock ulcer  #Down syndrome/Cerebral Palsy     RECOMMENDATIONS  - CXR white out L lung  - Check MRSA nares  - Continue meropenem & Vanco dosing by pharmacy AUC/ZANE  - Fungitell started downtrending 1 day after Caspo started, doubt related, but at this point given critical illness would plan on completing a course  - Continue caspofungin 50mg q24h IV , hx unclear elevated fungitell. No clear source, will likely need empiric 14 day course. 2/18. No risk factors for PCP or other invasive fungal infection   - CT Chest, AP when stable   - Grave prognosis, GOC     If any questions, please send a message or call on [facility] [employer] Teams  Please continue to update ID with any pertinent new laboratory or radiographic findings.       ASSESSMENT  58F w/ PMHx Down Syndrome, nonverbal at baseline, Hypothyroidism, Cerebral palsy and Seizure Disorders presents to the ED from nursing facility/group home presented to ED for respiratory distress and high grade fever.     IMPRESSION  #Fevers, resolving   Possible RUE phlebitis   Unclear source , fever curve and WBC downtrending with caspofungin, unclear source    2/9 BCX NGTD x2    2/3 BCX NGTD     2/1 BCX NGTD     2/1 UCX   >=3 organisms. Probable collection contamination.    1/31 BCX NG    Rapid RVP Result: NotDetec (02-04-24 @ 10:28)    MRSA PCR Result.: Negative (02-03-24 @ 21:32)     UA without significant pyuria   Procalcitonin, Serum: 0.22 (02-01-24 @ 16:00)--> Procalcitonin, Serum: 0.15 (02-03-24 @ 11:13), downtrending ; unremarkable   MRSA PCR Result.: Negative (01-22-24 @ 05:30)  < from: Xray Chest 1 View-PORTABLE IMMEDIATE (Xray Chest 1 View-PORTABLE IMMEDIATE .) (02.01.24 @ 03:02) >  Bibasal opacities without significant change.   #Acute hypoxic respiratory failure- Gram Negative pneumonia   #1/20 BCX 1/4 bottles : Staphylococcus hominis- contaminant   Repeat CX NG   #Severe Sepsis on admission  #RSV + 1/21  #Elevated fungitell   Fungitell: 63 (02-06-24 @ 11:27)  Fungitell: 325 (01-20-24 @ 21:23)  Fungitell: 138 (11-07-23 @ 08:08)  Fungitell: 115 (11-02-23 @ 11:40)  Fungitell: >500 pg/mL (10.17.23 @ 17:20) s/p empiric caspo   #Full thickness ulcer right heel- Appreciate burn/podiatry evaluation.  #History of Right planter foot ulcers - two full thickness ulcers - serous drainage with mild erythema with OM  - MR Foot No Cont, Right (10.16.23 @ 21:51): IMPRESSION: 1.  Limited exam. 2.  Osteomyelitis of the first metatarsal stump. 3.  Osteomyelitis of the second toe distal phalanx.  - s/p excidional debridement to and including bone 1st metatarsal with partial 2nd digit amputation - 1st metatarsal head resected - Wound Cx Proteus ESBL   #CKD 2-3 Creatinine: 0.7 mg/dL (02.02.24 @ 04:59)      #History of buttock ulcer  #Down syndrome/Cerebral Palsy     RECOMMENDATIONS  - CXR white out L lung  - Check MRSA nares  - Remove RUE PIV, phlebitis   - Continue meropenem & Vanco dosing by pharmacy AUC/ZANE  - Fungitell started downtrending 1 day after Caspo started, doubt related, but at this point given critical illness would plan on completing a course  - Continue caspofungin 50mg q24h IV , hx unclear elevated fungitell. No clear source, will likely need empiric 14 day course. 2/18. No risk factors for PCP or other invasive fungal infection   - CT Chest, AP when stable   - Grave prognosis, GOC     If any questions, please send a message or call on [employer]  Please continue to update ID with any pertinent new laboratory or radiographic findings.

## 2024-02-12 NOTE — PROGRESS NOTE ADULT - ASSESSMENT
58F w/ PMHx Down Syndrome, nonverbal at baseline, Hypothyroidism, Cerebral palsy and Seizure Disorders presents to the ED from nursing facility/group home ([facility]) presented to ED for respiratory distress and high grade fever. Patient found to be septic on admission.Admitted to SDU for management of acute respiratory distress 2/2 CAP/Aspiration PNA (20 Jan 2024 18:22)  While pt was on the floor she developed dyspnea after swallow evaluation, was spiking high grade fever, consulted by pulmonary/critical care and was upgraded back to SDU.    Sepsis POA / Acute hypoxic resp failure / RSV bronchiolitis /  H/o Dysphagia/ suspected aspiration  - inflammatory markers elevated, RVP is negative, MRSA neg on admission   - BCx (1/20): Staph hominis -  contaminant repeat BCx have been NGTD  - Failed FEES, s/p  NG tube for feedings and medications, patient will need PEG tube once improved. Recall GI once off HFNC   - Aspiration precautions, Chest PT vest , c/w duonebs  - ID is following, recommendations noted:   - c/w  Caspo 50mg q24h IV   - Repeat fungitell 63, indeterminate  - c/w meropenem 1g q8h IV  -  histoplasma Ab serum negative   - needs CT A/P once more stable, repeat Procal as still febrile and fu ID   - on HFNC/NRB Keep spo2 more than 92%. May need intubation   - remains on levophed 0.1 (previously on 0.07 )and midodrine 20 Q8H   - has been receiving IVF boluses PRN     Hypernatremia - resolved, cw free water to 250 Q4H     H/o hypotension  - Midodrine dose increased to 20 mg Q 8 hours    - keep MAP above 60, taper off levophed as able     Type 2 NSTEMI   - Elevated Trops and Tachycardia (sepsis)    - c/w telemetry monitoring   - Repeat EKG: Normal sinus rhythm  - c/w  metoprolol    Seizure Disorder  - c/w  Keppra   - seizure precautions  - keep Mg above 2.0     H/o lower ext DVT  - cw therapeutic Lovenox, holding Eliquis     Normocytic Anemia - stable    Down Syndrome/  Cerebral Palsy  - supportive care  - prevent falls and aspiration   - failed speech and swallow, needs PEG as above  - on Raloxifene     Full code, overall prognosis is very poor, continue monitoring in SDU     Patient seen at bedside, total time spent to evaluate and treat the patient's acute illness and chronic medical conditions as well as time spent reviewing labs, radiology, discussing medical plan with covering medical team was more than 55 minutes with >50% of time spent face to face with patient, discussing with patient/family as well as coordination of care

## 2024-02-12 NOTE — PROGRESS NOTE ADULT - SUBJECTIVE AND OBJECTIVE BOX
TEA ECHAVARRIA  58y, Female  Allergy: No Known Allergies      LOS  23d    CHIEF COMPLAINT: Respiratory Distress (12 Feb 2024 08:32)      INTERVAL EVENTS/HPI  - T(F): , Max: 100.4 (02-12-24 @ 00:00) fever, team added vanco   - WBC Count: 12.27 (02-12-24 @ 06:37)  WBC Count: 11.22 (02-11-24 @ 05:53)     - Creatinine: 0.5 (02-12-24 @ 06:37)  Creatinine: 0.5 (02-11-24 @ 05:53)     -   -   -     ROS  cannot obtain secondary to patient's sedation and/or mental status    VITALS:  T(F): 98.5, Max: 100.4 (02-12-24 @ 00:00)  HR: 59  BP: 142/64  RR: 20Vital Signs Last 24 Hrs  T(C): 36.9 (12 Feb 2024 08:00), Max: 38 (12 Feb 2024 00:00)  T(F): 98.5 (12 Feb 2024 08:00), Max: 100.4 (12 Feb 2024 00:00)  HR: 59 (12 Feb 2024 08:00) (59 - 105)  BP: 142/64 (12 Feb 2024 08:00) (86/51 - 142/64)  BP(mean): 92 (12 Feb 2024 08:00) (61 - 92)  RR: 20 (12 Feb 2024 08:00) (18 - 20)  SpO2: 99% (12 Feb 2024 08:00) (95% - 100%)    Parameters below as of 12 Feb 2024 09:10  Patient On (Oxygen Delivery Method): nasal cannula, high flow  O2 Flow (L/min): 60  O2 Concentration (%): 90    PHYSICAL EXAM:  ***     FH: Non-contributory  Social Hx: Non-contributory    TESTS & MEASUREMENTS:                        9.0    12.27 )-----------( 558      ( 12 Feb 2024 06:37 )             30.1     02-12    139  |  100  |  14  ----------------------------<  217<H>  5.0   |  29  |  0.5<L>    Ca    8.4      12 Feb 2024 06:37  Mg     2.5     02-12    TPro  6.1  /  Alb  2.7<L>  /  TBili  0.2  /  DBili  x   /  AST  18  /  ALT  15  /  AlkPhos  90  02-12      LIVER FUNCTIONS - ( 12 Feb 2024 06:37 )  Alb: 2.7 g/dL / Pro: 6.1 g/dL / ALK PHOS: 90 U/L / ALT: 15 U/L / AST: 18 U/L / GGT: x           Urinalysis Basic - ( 12 Feb 2024 06:37 )    Color: x / Appearance: x / SG: x / pH: x  Gluc: 217 mg/dL / Ketone: x  / Bili: x / Urobili: x   Blood: x / Protein: x / Nitrite: x   Leuk Esterase: x / RBC: x / WBC x   Sq Epi: x / Non Sq Epi: x / Bacteria: x        Culture - Blood (collected 02-09-24 @ 11:57)  Source: .Blood None  Preliminary Report (02-11-24 @ 22:01):    No growth at 48 Hours    Culture - Blood (collected 02-09-24 @ 11:57)  Source: .Blood None  Preliminary Report (02-11-24 @ 22:01):    No growth at 48 Hours    Culture - Blood (collected 02-03-24 @ 11:13)  Source: .Blood None  Final Report (02-08-24 @ 20:00):    No growth at 5 days    Culture - Blood (collected 02-01-24 @ 11:58)  Source: .Blood None  Final Report (02-06-24 @ 23:06):    No growth at 5 days    Culture - Urine (collected 02-01-24 @ 11:40)  Source: Clean Catch Clean Catch (Midstream)  Final Report (02-03-24 @ 00:06):    >=3 organisms. Probable collection contamination.    Culture - Blood (collected 01-31-24 @ 18:21)  Source: .Blood None  Final Report (02-06-24 @ 01:01):    No growth at 5 days    Culture - Blood (collected 01-31-24 @ 18:21)  Source: .Blood None  Final Report (02-06-24 @ 01:01):    No growth at 5 days    Culture - Urine (collected 01-23-24 @ 05:20)  Source: Clean Catch Clean Catch (Midstream)  Final Report (01-25-24 @ 00:26):    No growth    Culture - Blood (collected 01-22-24 @ 16:51)  Source: .Blood None  Final Report (01-28-24 @ 01:00):    No growth at 5 days    Culture - Urine (collected 01-21-24 @ 05:00)  Source: Clean Catch Clean Catch (Midstream)  Final Report (01-22-24 @ 07:15):    No growth    Culture - Blood (collected 01-20-24 @ 16:15)  Source: .Blood Blood-Peripheral  Gram Stain (01-21-24 @ 20:33):    Growth in aerobic bottle: Gram positive cocci in pairs  Final Report (01-22-24 @ 19:04):    Growth in aerobic bottle: Staphylococcus hominis    Isolation of Coagulase negative Staphylococcus from single blood culture    sets may represent    contamination. Contact the Microbiology Department at [phone] if    susceptibility testing is    clinically indicated.    Direct identification is available within approximately 3-5    hours either by Blood Panel Multiplexed PCR or Direct    MALDI-TOF. Details: [email or web address]  Organism: Blood Culture PCR (01-22-24 @ 19:04)  Organism: Blood Culture PCR (01-22-24 @ 19:04)      Method Type: PCR      -  Coagulase negative Staphylococcus: Detec    Culture - Blood (collected 01-20-24 @ 16:15)  Source: .Blood Blood-Peripheral  Final Report (01-25-24 @ 23:00):    No growth at 5 days            INFECTIOUS DISEASES TESTING  Fungitell: 63 (02-06-24 @ 11:27)  Fungitell: 65 (02-06-24 @ 04:43)  Rapid RVP Result: NotDetec (02-04-24 @ 10:28)  MRSA PCR Result.: Negative (02-03-24 @ 21:32)  Procalcitonin, Serum: 0.15 (02-03-24 @ 11:13)  Procalcitonin, Serum: 0.22 (02-01-24 @ 16:00)  MRSA PCR Result.: Negative (01-22-24 @ 05:30)  Legionella Antigen, Urine: Negative (01-21-24 @ 05:00)  Rapid RVP Result: Detected (01-21-24 @ 00:58)  Procalcitonin, Serum: 0.51 (01-20-24 @ 21:23)  Fungitell: 325 (01-20-24 @ 21:23)  Fungitell: 138 (11-07-23 @ 08:08)  Fungitell: 115 (11-02-23 @ 11:40)  Procalcitonin, Serum: 0.04 (11-02-23 @ 11:40)  Procalcitonin, Serum: 0.26 (10-24-23 @ 11:00)  MRSA PCR Result.: Negative (10-17-23 @ 17:20)  Fungitell: >500 (10-17-23 @ 17:20)  Procalcitonin, Serum: 4.36 (10-17-23 @ 17:20)  Procalcitonin, Serum: 4.18 (10-17-23 @ 12:35)  Procalcitonin, Serum: 0.07 (10-15-23 @ 07:28)  COVID-19 PCR: NotDetec (10-12-23 @ 09:14)  Procalcitonin, Serum: 0.40 (10-07-23 @ 10:54)  Legionella Antigen, Urine: Negative (09-30-23 @ 14:36)  MRSA PCR Result.: Negative (09-29-23 @ 16:50)  Procalcitonin, Serum: 9.78 (08-12-23 @ 11:25)  MRSA PCR Result.: Negative (08-12-23 @ 06:10)  Rapid RVP Result: NotDetec (08-12-23 @ 01:33)  Procalcitonin, Serum: 0.63 (08-10-23 @ 08:46)  Procalcitonin, Serum: 7.82 (08-04-23 @ 16:13)  MRSA PCR Result.: Negative (08-04-23 @ 14:52)  Rapid RVP Result: NotDetec (08-04-23 @ 03:00)      INFLAMMATORY MARKERS  Sedimentation Rate, Erythrocyte: 100 mm/Hr (02-03-24 @ 11:13)  C-Reactive Protein, Serum: 214.2 mg/L (02-03-24 @ 11:13)  C-Reactive Protein, Serum: 132.3 mg/L (02-01-24 @ 16:00)  Sedimentation Rate, Erythrocyte: 67 mm/Hr (10-15-23 @ 07:28)      RADIOLOGY & ADDITIONAL TESTS:  I have personally reviewed the last available Chest xray  CXR      CT      CARDIOLOGY TESTING  12 Lead ECG:   Ventricular Rate 118 BPM    Atrial Rate 118 BPM    P-R Interval 122 ms    QRS Duration 64 ms    Q-T Interval 328 ms    QTC Calculation(Bazett) 459 ms    P Axis 54 degrees    R Axis 93 degrees    T Axis 58 degrees    Diagnosis Line Sinus tachycardia  Rightward axis  Low voltage QRS  Borderline ECG    Confirmed by MARJAN MENDES MD ([id number]) on 2/2/2024 7:15:17 AM (02-01-24 @ 14:30)  12 Lead ECG:   Ventricular Rate 88 BPM    Atrial Rate 88 BPM    P-R Interval 112 ms    QRS Duration 64 ms    Q-T Interval 362 ms    QTC Calculation(Bazett) 438 ms    P Axis -11 degrees    R Axis 95 degrees    T Axis 59 degrees    Diagnosis Line Normal sinus rhythm  Rightward axis  Borderline ECG    Confirmed by caleb roberts ([id number]) on 1/31/2024 2:01:26 PM (01-31-24 @ 11:11)      MEDICATIONS  acetylcysteine 20%  Inhalation 4  albuterol/ipratropium for Nebulization 3  artificial  tears Solution 1  calcium carbonate   1250 mG (OsCal) 1  caspofungin IVPB   caspofungin IVPB 50  chlorhexidine 2% Cloths 1  enoxaparin Injectable 50  gabapentin 300  levETIRAcetam  Solution 500  melatonin 3  meropenem  IVPB 1000  midodrine. 20  pantoprazole  Injectable 40  polyethylene glycol 3350 17  raloxifene 60  scopolamine 1 mG/72 Hr(s) Patch 1  senna 2  silver sulfADIAZINE 1% Cream 1  sodium chloride 0.65% Nasal 2  sodium chloride 3%  Inhalation 4  vancomycin  IVPB 750      WEIGHT  Weight (kg): 52.3 (01-21-24 @ 08:00)  Creatinine: 0.5 mg/dL (02-12-24 @ 06:37)      ANTIBIOTICS:  caspofungin IVPB 50 milliGRAM(s) IV Intermittent every 24 hours  caspofungin IVPB      meropenem  IVPB 1000 milliGRAM(s) IV Intermittent every 8 hours  vancomycin  IVPB 750 milliGRAM(s) IV Intermittent once      All available historical records have been reviewed   TEA ECHAVARRIA  58y, Female  Allergy: No Known Allergies      LOS  23d    CHIEF COMPLAINT: Respiratory Distress (12 Feb 2024 08:32)      INTERVAL EVENTS/HPI  - T(F): , Max: 100.4 (02-12-24 @ 00:00) fever, team added vanco   - WBC Count: 12.27 (02-12-24 @ 06:37)  WBC Count: 11.22 (02-11-24 @ 05:53)     - Creatinine: 0.5 (02-12-24 @ 06:37)  Creatinine: 0.5 (02-11-24 @ 05:53)     -   -   -     ROS  cannot obtain secondary to patient's sedation and/or mental status    VITALS:  T(F): 98.5, Max: 100.4 (02-12-24 @ 00:00)  HR: 59  BP: 142/64  RR: 20Vital Signs Last 24 Hrs  T(C): 36.9 (12 Feb 2024 08:00), Max: 38 (12 Feb 2024 00:00)  T(F): 98.5 (12 Feb 2024 08:00), Max: 100.4 (12 Feb 2024 00:00)  HR: 59 (12 Feb 2024 08:00) (59 - 105)  BP: 142/64 (12 Feb 2024 08:00) (86/51 - 142/64)  BP(mean): 92 (12 Feb 2024 08:00) (61 - 92)  RR: 20 (12 Feb 2024 08:00) (18 - 20)  SpO2: 99% (12 Feb 2024 08:00) (95% - 100%)    Parameters below as of 12 Feb 2024 09:10  Patient On (Oxygen Delivery Method): nasal cannula, high flow  O2 Flow (L/min): 60  O2 Concentration (%): 90    PHYSICAL EXAM:  Gen: chronically ill appearing , face mask , contracted  HEENT: Normocephalic, atraumatic  Neck: supple, no lymphadenopathy  CV: Regular rate & regular rhythm  Lungs: decreased BS at bases, no fremitus  Abdomen: Soft, BS present  Ext: Warm, well perfused  Neuro: non focal, not following commands  Skin: no rash, no erythema  Lines: RUE phlebitis     FH: Non-contributory  Social Hx: Non-contributory    TESTS & MEASUREMENTS:                        9.0    12.27 )-----------( 558      ( 12 Feb 2024 06:37 )             30.1     02-12    139  |  100  |  14  ----------------------------<  217<H>  5.0   |  29  |  0.5<L>    Ca    8.4      12 Feb 2024 06:37  Mg     2.5     02-12    TPro  6.1  /  Alb  2.7<L>  /  TBili  0.2  /  DBili  x   /  AST  18  /  ALT  15  /  AlkPhos  90  02-12      LIVER FUNCTIONS - ( 12 Feb 2024 06:37 )  Alb: 2.7 g/dL / Pro: 6.1 g/dL / ALK PHOS: 90 U/L / ALT: 15 U/L / AST: 18 U/L / GGT: x           Urinalysis Basic - ( 12 Feb 2024 06:37 )    Color: x / Appearance: x / SG: x / pH: x  Gluc: 217 mg/dL / Ketone: x  / Bili: x / Urobili: x   Blood: x / Protein: x / Nitrite: x   Leuk Esterase: x / RBC: x / WBC x   Sq Epi: x / Non Sq Epi: x / Bacteria: x        Culture - Blood (collected 02-09-24 @ 11:57)  Source: .Blood None  Preliminary Report (02-11-24 @ 22:01):    No growth at 48 Hours    Culture - Blood (collected 02-09-24 @ 11:57)  Source: .Blood None  Preliminary Report (02-11-24 @ 22:01):    No growth at 48 Hours    Culture - Blood (collected 02-03-24 @ 11:13)  Source: .Blood None  Final Report (02-08-24 @ 20:00):    No growth at 5 days    Culture - Blood (collected 02-01-24 @ 11:58)  Source: .Blood None  Final Report (02-06-24 @ 23:06):    No growth at 5 days    Culture - Urine (collected 02-01-24 @ 11:40)  Source: Clean Catch Clean Catch (Midstream)  Final Report (02-03-24 @ 00:06):    >=3 organisms. Probable collection contamination.    Culture - Blood (collected 01-31-24 @ 18:21)  Source: .Blood None  Final Report (02-06-24 @ 01:01):    No growth at 5 days    Culture - Blood (collected 01-31-24 @ 18:21)  Source: .Blood None  Final Report (02-06-24 @ 01:01):    No growth at 5 days    Culture - Urine (collected 01-23-24 @ 05:20)  Source: Clean Catch Clean Catch (Midstream)  Final Report (01-25-24 @ 00:26):    No growth    Culture - Blood (collected 01-22-24 @ 16:51)  Source: .Blood None  Final Report (01-28-24 @ 01:00):    No growth at 5 days    Culture - Urine (collected 01-21-24 @ 05:00)  Source: Clean Catch Clean Catch (Midstream)  Final Report (01-22-24 @ 07:15):    No growth    Culture - Blood (collected 01-20-24 @ 16:15)  Source: .Blood Blood-Peripheral  Gram Stain (01-21-24 @ 20:33):    Growth in aerobic bottle: Gram positive cocci in pairs  Final Report (01-22-24 @ 19:04):    Growth in aerobic bottle: Staphylococcus hominis    Isolation of Coagulase negative Staphylococcus from single blood culture    sets may represent    contamination. Contact the Microbiology Department at [phone] if    susceptibility testing is    clinically indicated.    Direct identification is available within approximately 3-5    hours either by Blood Panel Multiplexed PCR or Direct    MALDI-TOF. Details: [email or web address]  Organism: Blood Culture PCR (01-22-24 @ 19:04)  Organism: Blood Culture PCR (01-22-24 @ 19:04)      Method Type: PCR      -  Coagulase negative Staphylococcus: Detec    Culture - Blood (collected 01-20-24 @ 16:15)  Source: .Blood Blood-Peripheral  Final Report (01-25-24 @ 23:00):    No growth at 5 days            INFECTIOUS DISEASES TESTING  Fungitell: 63 (02-06-24 @ 11:27)  Fungitell: 65 (02-06-24 @ 04:43)  Rapid RVP Result: NotDetec (02-04-24 @ 10:28)  MRSA PCR Result.: Negative (02-03-24 @ 21:32)  Procalcitonin, Serum: 0.15 (02-03-24 @ 11:13)  Procalcitonin, Serum: 0.22 (02-01-24 @ 16:00)  MRSA PCR Result.: Negative (01-22-24 @ 05:30)  Legionella Antigen, Urine: Negative (01-21-24 @ 05:00)  Rapid RVP Result: Detected (01-21-24 @ 00:58)  Procalcitonin, Serum: 0.51 (01-20-24 @ 21:23)  Fungitell: 325 (01-20-24 @ 21:23)  Fungitell: 138 (11-07-23 @ 08:08)  Fungitell: 115 (11-02-23 @ 11:40)  Procalcitonin, Serum: 0.04 (11-02-23 @ 11:40)  Procalcitonin, Serum: 0.26 (10-24-23 @ 11:00)  MRSA PCR Result.: Negative (10-17-23 @ 17:20)  Fungitell: >500 (10-17-23 @ 17:20)  Procalcitonin, Serum: 4.36 (10-17-23 @ 17:20)  Procalcitonin, Serum: 4.18 (10-17-23 @ 12:35)  Procalcitonin, Serum: 0.07 (10-15-23 @ 07:28)  COVID-19 PCR: NotDetec (10-12-23 @ 09:14)  Procalcitonin, Serum: 0.40 (10-07-23 @ 10:54)  Legionella Antigen, Urine: Negative (09-30-23 @ 14:36)  MRSA PCR Result.: Negative (09-29-23 @ 16:50)  Procalcitonin, Serum: 9.78 (08-12-23 @ 11:25)  MRSA PCR Result.: Negative (08-12-23 @ 06:10)  Rapid RVP Result: NotDetec (08-12-23 @ 01:33)  Procalcitonin, Serum: 0.63 (08-10-23 @ 08:46)  Procalcitonin, Serum: 7.82 (08-04-23 @ 16:13)  MRSA PCR Result.: Negative (08-04-23 @ 14:52)  Rapid RVP Result: NotDetec (08-04-23 @ 03:00)      INFLAMMATORY MARKERS  Sedimentation Rate, Erythrocyte: 100 mm/Hr (02-03-24 @ 11:13)  C-Reactive Protein, Serum: 214.2 mg/L (02-03-24 @ 11:13)  C-Reactive Protein, Serum: 132.3 mg/L (02-01-24 @ 16:00)  Sedimentation Rate, Erythrocyte: 67 mm/Hr (10-15-23 @ 07:28)      RADIOLOGY & ADDITIONAL TESTS:  I have personally reviewed the last available Chest xray  CXR      CT      CARDIOLOGY TESTING  12 Lead ECG:   Ventricular Rate 118 BPM    Atrial Rate 118 BPM    P-R Interval 122 ms    QRS Duration 64 ms    Q-T Interval 328 ms    QTC Calculation(Bazett) 459 ms    P Axis 54 degrees    R Axis 93 degrees    T Axis 58 degrees    Diagnosis Line Sinus tachycardia  Rightward axis  Low voltage QRS  Borderline ECG    Confirmed by MARJAN MENDES MD ([id number]) on 2/2/2024 7:15:17 AM (02-01-24 @ 14:30)  12 Lead ECG:   Ventricular Rate 88 BPM    Atrial Rate 88 BPM    P-R Interval 112 ms    QRS Duration 64 ms    Q-T Interval 362 ms    QTC Calculation(Bazett) 438 ms    P Axis -11 degrees    R Axis 95 degrees    T Axis 59 degrees    Diagnosis Line Normal sinus rhythm  Rightward axis  Borderline ECG    Confirmed by caleb roberts ([id number]) on 1/31/2024 2:01:26 PM (01-31-24 @ 11:11)      MEDICATIONS  acetylcysteine 20%  Inhalation 4  albuterol/ipratropium for Nebulization 3  artificial  tears Solution 1  calcium carbonate   1250 mG (OsCal) 1  caspofungin IVPB   caspofungin IVPB 50  chlorhexidine 2% Cloths 1  enoxaparin Injectable 50  gabapentin 300  levETIRAcetam  Solution 500  melatonin 3  meropenem  IVPB 1000  midodrine. 20  pantoprazole  Injectable 40  polyethylene glycol 3350 17  raloxifene 60  scopolamine 1 mG/72 Hr(s) Patch 1  senna 2  silver sulfADIAZINE 1% Cream 1  sodium chloride 0.65% Nasal 2  sodium chloride 3%  Inhalation 4  vancomycin  IVPB 750      WEIGHT  Weight (kg): 52.3 (01-21-24 @ 08:00)  Creatinine: 0.5 mg/dL (02-12-24 @ 06:37)      ANTIBIOTICS:  caspofungin IVPB 50 milliGRAM(s) IV Intermittent every 24 hours  caspofungin IVPB      meropenem  IVPB 1000 milliGRAM(s) IV Intermittent every 8 hours  vancomycin  IVPB 750 milliGRAM(s) IV Intermittent once      All available historical records have been reviewed

## 2024-02-12 NOTE — PROGRESS NOTE ADULT - ASSESSMENT
58F w/ PMHx Down Syndrome, nonverbal at baseline, Hypothyroidism, Cerebral palsy and Seizure Disorders presents to the ED from nursing facility/group home ([facility]) presented to ED for respiratory distress and high grade fever. Patient found to be septic on admission.Admitted to SDU for management of acute respiratory distress 2/2 CAP/Aspiration PNA (20 Jan 2024 18:22)  While pt was on the floor she developed dyspnea after swallow evaluation, was spiking high grade fever, consulted by pulmonary/critical care and was upgraded back to SDU.    #Sepsis POA (Febrile, Tachycardic, Leukocytosis)  #Acute Hypoxic Respiratory Failure secondary to Aspiration PNA / RSV  #Hx of Dysphagia - Puree Diet at baseline  #Left lung almost complete collapse on 02.12.2024  - On admission: VBG Lactate 2.1 improved to wnl, procal 0.04 pH wnl and CO2 mildly elevated 61  - inflammatory markers elevated, RVP is RSV +ve  - MRSA swab neg, urine strep neg, urine legionella neg  - D-dimer 605 and LE duplex neg  - BCx (1/20): Staph hominis -  contaminant repeat BCx have been NGTD  - UCx neg  - Failed FEES, s/p  NG tube for feedings and medications, patient will need PEG tube once improved. Recall GI once off HFNC   - Aspiration precautions, Chest PT vest , c/w nebs, consider bronchoscopy tmw  - ID is following, recommendations noted:   - Repeat fungitell 63, indeterminate (Fungitell chronically positive, received Caspofungin on previous admission)  - c/w  Caspo 50mg q24h IV till 02.18  - c/w meropenem 1g q8h IV  - Remove RUE PIV, phlebitis   - DC Vanco per ID as MRSA is negative  -  histoplasma Ab serum negative   - needs CT A/P once more stable   - on HFNC/NRB Keep spo2 more than 92%. May need intubation   - remains on levophed 0.1 and midodrine 20 Q8H   - has been receiving IVF boluses PRN     #Hypernatremia - improved, cw free water to 250 Q4H   #H/o hypotension  - Midodrine dose increased to 20 mg Q 8 hours    - keep MAP above 60, taper off levophed as able     #Elevated Troponin (Stabilized)  #Sinus Tachycardia (Controlled)  - Elevated Trops and Tachycardia (sepsis)    - Troponins: 25>37>35>35  - c/w telemetry monitoring   - Repeat EKG: Normal sinus rhythm  - C/w Lopressor to 12.5mg BID w/ hold parameters    #Hx of Right Foot OM (1st Toe Stump and 2nd Distal Phalanx)  #Hx of Multiple Left Foot DTIs  #Hx of Sacral Wound  - Previous wound cultures positive for Proteus and ESBL E coli  - Patient underwent excisional debridement of ulcer of right foot (including 1st metatarsal) and partial amputation 2nd toe on previous admissions  - No acute surgical interventions from podiatry and burn  - C/w q2hr repositioning  - C/w local wound care  - Podiatry recalled 2/5 for foot wounds, updated recs placed    #Seizure Disorder  - c/w  Keppra 500 BID  - seizure precautions  - keep Mg above 2.0     #Hx of DVT of L Common Femoral Vein  - LE Duplex negative for DVT  - C/w Lovenox 50mg q12hr, HOLDING ELIQUIS    #Acute on Chronic Iron Deficiency Anemia (Stable)  - On admission: HgB 8.9 (previously 9.5)  - No evidence of hemorrhage  - B12 and Folate WNL normal  - Total Iron 15, Iron Saturation 6%, Ferritin 128 reflecting potential TIFFANI  - Hold off on iron supplementation in setting of infection  - Keep HgB >7    #Down Syndrome/  Cerebral Palsy  - supportive care  - prevent falls and aspiration   - failed speech and swallow, needs PEG as above  - C/w Gabapentin 300mg BID  - C/w Raloxifene 60mg QD    Full code, overall prognosis is very poor, continue monitoring in SDU  PENDING: CXR in am, possible bronchoscopy, CT chest AP, wean Levophed     58F w/ PMHx Down Syndrome, nonverbal at baseline, Hypothyroidism, Cerebral palsy and Seizure Disorders presents to the ED from nursing facility/group home ([facility]) presented to ED for respiratory distress and high grade fever. Patient found to be septic on admission.Admitted to SDU for management of acute respiratory distress 2/2 CAP/Aspiration PNA (20 Jan 2024 18:22)  While pt was on the floor she developed dyspnea after swallow evaluation, was spiking high grade fever, consulted by pulmonary/critical care and was upgraded back to SDU.    #Sepsis POA (Febrile, Tachycardic, Leukocytosis)  #Acute Hypoxic Respiratory Failure secondary to Aspiration PNA / RSV  #Hx of Dysphagia - Puree Diet at baseline  #Left lung almost complete collapse on 02.12.2024  - On admission: VBG Lactate 2.1 improved to wnl, procal 0.04 pH wnl and CO2 mildly elevated 61  - inflammatory markers elevated, RVP is RSV +ve  - MRSA swab neg, urine strep neg, urine legionella neg  - D-dimer 605 and LE duplex neg  - BCx (1/20): Staph hominis -  contaminant repeat BCx have been NGTD  - UCx neg  - Failed FEES, s/p  NG tube for feedings and medications, patient will need PEG tube once improved. Recall GI once off HFNC   - Aspiration precautions, Chest PT vest , c/w nebs, consider bronchoscopy tmw  - ID is following, recommendations noted:   - Repeat fungitell 63, indeterminate (Fungitell chronically positive, received Caspofungin on previous admission)  - c/w  Caspo 50mg q24h IV till 02.18  - c/w meropenem 1g q8h IV  - Remove RUE PIV, phlebitis   - DC Vanco per ID as MRSA is negative  -  histoplasma Ab serum negative   - needs CT A/P once more stable   - on HFNC/NRB Keep spo2 more than 92%. May need intubation   - remains on levophed 0.1 and midodrine 20 Q8H   - has been receiving IVF boluses PRN     #Hypernatremia - improved, cw free water to 250 Q4H   #H/o hypotension  - Midodrine dose increased to 20 mg Q 8 hours    - keep MAP above 60, taper off levophed as able     #Elevated Troponin (Stabilized)  #Sinus Tachycardia (Controlled)  - Elevated Trops and Tachycardia (sepsis)    - Troponins: 25>37>35>35  - c/w telemetry monitoring   - Repeat EKG: Normal sinus rhythm  - C/w Lopressor to 12.5mg BID w/ hold parameters    #Hx of Right Foot OM (1st Toe Stump and 2nd Distal Phalanx)  #Hx of Multiple Left Foot DTIs  #Hx of Sacral Wound  - Previous wound cultures positive for Proteus and ESBL E coli  - Patient underwent excisional debridement of ulcer of right foot (including 1st metatarsal) and partial amputation 2nd toe on previous admissions  - No acute surgical interventions from podiatry and burn  - C/w q2hr repositioning  - C/w local wound care  - Podiatry recalled 2/5 for foot wounds, updated recs placed    #Seizure Disorder  - c/w  Keppra 500 BID  - seizure precautions  - keep Mg above 2.0     #Hx of DVT of L Common Femoral Vein  - LE Duplex negative for DVT  - C/w Lovenox 50mg q12hr, HOLDING ELIQUIS    #Acute on Chronic Iron Deficiency Anemia (Stable)  - On admission: HgB 8.9 (previously 9.5)  - No evidence of hemorrhage  - B12 and Folate WNL normal  - Total Iron 15, Iron Saturation 6%, Ferritin 128 reflecting potential TIFFANI  - Hold off on iron supplementation in setting of infection  - Keep HgB >7    #Down Syndrome/  Cerebral Palsy  - supportive care  - prevent falls and aspiration   - failed speech and swallow, needs PEG as above  - C/w Gabapentin 300mg BID  - C/w Raloxifene 60mg QD    Full code, overall prognosis is very poor, continue monitoring in SDU  PENDING: CXR in am, possible bronchoscopy, CT chest AP, wean Levophed, PEG consent pending advisory board     58F w/ PMHx Down Syndrome, nonverbal at baseline, Hypothyroidism, Cerebral palsy and Seizure Disorders presents to the ED from nursing facility/group home ([facility]) presented to ED for respiratory distress and high grade fever. Patient found to be septic on admission.Admitted to SDU for management of acute respiratory distress 2/2 CAP/Aspiration PNA (20 Jan 2024 18:22)  While pt was on the floor she developed dyspnea after swallow evaluation, was spiking high grade fever, consulted by pulmonary/critical care and was upgraded back to SDU.    #Sepsis POA (Febrile, Tachycardic, Leukocytosis)  #Acute Hypoxic Respiratory Failure secondary to Aspiration PNA / RSV  #Hx of Dysphagia - Puree Diet at baseline  #Left lung almost complete collapse on 02.12.2024  - On admission: VBG Lactate 2.1 improved to wnl, procal 0.04 pH wnl and CO2 mildly elevated 61  - inflammatory markers elevated, RVP is RSV +ve  - MRSA swab neg, urine strep neg, urine legionella neg  - D-dimer 605 and LE duplex neg  - BCx (1/20): Staph hominis -  contaminant repeat BCx have been NGTD  - UCx neg  - Failed FEES, s/p  NG tube for feedings and medications, patient will need PEG tube once improved. Recall GI once off HFNC   - Aspiration precautions, Chest PT vest , c/w nebs, consider bronchoscopy tmw  - ID is following, recommendations noted:   - Repeat fungitell 63, indeterminate (Fungitell chronically positive, received Caspofungin on previous admission)  - c/w  Caspo 50mg q24h IV till 02.18  - c/w meropenem 1g q8h IV  - Remove RUE PIV, phlebitis   - DC Vanco per ID as MRSA is negative  -  histoplasma Ab serum negative   - needs CT A/P once more stable   - on HFNC/NRB Keep spo2 more than 92%. May need intubation   - remains on levophed 0.1 and midodrine 20 Q8H   - has been receiving IVF boluses PRN     #Hypernatremia - improved, cw free water to 250 Q4H   #H/o hypotension  - Midodrine dose increased to 20 mg Q 8 hours    - keep MAP above 60, taper off levophed as able     #Elevated Troponin (Stabilized)  #Sinus Tachycardia (Controlled)  - Elevated Trops and Tachycardia (sepsis)    - Troponins: 25>37>35>35  - c/w telemetry monitoring   - Repeat EKG: Normal sinus rhythm  - C/w Lopressor to 12.5mg BID w/ hold parameters    #Hx of Right Foot OM (1st Toe Stump and 2nd Distal Phalanx)  #Hx of Multiple Left Foot DTIs  #Hx of Sacral Wound  - Previous wound cultures positive for Proteus and ESBL E coli  - Patient underwent excisional debridement of ulcer of right foot (including 1st metatarsal) and partial amputation 2nd toe on previous admissions  - No acute surgical interventions from podiatry and burn  - C/w q2hr repositioning  - C/w local wound care  - Podiatry recalled 2/5 for foot wounds, updated recs placed    #Seizure Disorder  - c/w  Keppra 500 BID  - seizure precautions  - keep Mg above 2.0     #Hx of DVT of L Common Femoral Vein  - LE Duplex negative for DVT  - C/w Lovenox 50mg q12hr, HOLDING ELIQUIS    #Acute on Chronic Iron Deficiency Anemia (Stable)  - On admission: HgB 8.9 (previously 9.5)  - No evidence of hemorrhage  - B12 and Folate WNL normal  - Total Iron 15, Iron Saturation 6%, Ferritin 128 reflecting potential TIFFANI  - Hold off on iron supplementation in setting of infection  - Keep HgB >7    #Down Syndrome/  Cerebral Palsy  - supportive care  - prevent falls and aspiration   - failed speech and swallow, needs PEG as above  - C/w Gabapentin 300mg BID  - C/w Raloxifene 60mg QD    Full code, overall prognosis is very poor, continue monitoring in SDU  PENDING: CXR in am, possible bronchoscopy, CT chest AP, wean Levophed, PEG consent pending advisory board, procal

## 2024-02-12 NOTE — PROGRESS NOTE ADULT - SUBJECTIVE AND OBJECTIVE BOX
JERICHO TEA  58y Female    CHIEF COMPLAINT:    Patient is a 58y old  Female who presents with a chief complaint of Respiratory Distress (2024 08:27)    INTERVAL HPI/OVERNIGHT EVENTS:    Patient seen and examined. No acute events overnight. Remains critically ill on HFNC 60/90 with NRB, levophed requirements increasing to 0.1  ROS: All other systems are negative.    Vital Signs:    T(F): 100.4 (24 @ 00:00), Max: 100.4 (24 @ 00:00)  HR: 83 (24 @ 04:00) (64 - 105)  BP: 90/53 (24 @ 04:00) (86/51 - 119/56)  RR: 18 (24 @ 00:00) (18 - 18)  SpO2: 98% (24 @ 04:00) (95% - 100%)    2024 07:01  -  2024 07:00  --------------------------------------------------------  IN: 1358.4 mL / OUT: 1100 mL / NET: 258.4 mL    2024 07:01  -  2024 08:11  --------------------------------------------------------  IN: 9.8 mL / OUT: 0 mL / NET: 9.8 mL      Daily Weight in k.3 (2024 00:00)    PHYSICAL EXAM:    GENERAL:  NAD chronically ill appearing, pale   SKIN: No rashes or lesions  HEENT: Atraumatic. Normocephalic.    NECK: Supple, No JVD.    PULMONARY: decreased breath sounds B/L. No wheezing.   CVS: Normal S1, S2. Rate and Rhythm are regular.   ABDOMEN/GI: Soft, Nontender, mildly distended   MSK:  No clubbing or cyanosis   NEUROLOGIC: opens eyes at times, does not follow commands   PSYCH: lethargic     Consultant(s) Notes Reviewed:  [x ] YES  [ ] NO  Care Discussed with Consultants/Other Providers [ x] YES  [ ] NO    LABS:                        9.0    12.27 )-----------( 558      ( 2024 06:37 )             30.1     140  |  99  |  13  ----------------------------<  154<H>  4.5   |  30  |  0.5<L>    Ca    8.0<L>      2024 05:53  Mg     2.3         TPro  6.0  /  Alb  2.5<L>  /  TBili  0.2  /  DBili  x   /  AST  20  /  ALT  17  /  AlkPhos  82      Culture - Blood (collected 2024 11:57)  Source: .Blood None  Preliminary Report (2024 22:01):    No growth at 48 Hours    Culture - Blood (collected 2024 11:57)  Source: .Blood None  Preliminary Report (2024 22:01):    No growth at 48 Hours    RADIOLOGY & ADDITIONAL TESTS:  Imaging or report Personally Reviewed:  [x] YES  [ ] NO  EKG reviewed: [x] YES  [ ] NO    Medications:  Standing  albuterol/ipratropium for Nebulization 3 milliLiter(s) Nebulizer <User Schedule>  artificial  tears Solution 1 Drop(s) Both EYES two times a day  calcium carbonate   1250 mG (OsCal) 1 Tablet(s) Oral two times a day  caspofungin IVPB      caspofungin IVPB 50 milliGRAM(s) IV Intermittent every 24 hours  chlorhexidine 2% Cloths 1 Application(s) Topical daily  enoxaparin Injectable 50 milliGRAM(s) SubCutaneous every 12 hours  gabapentin 300 milliGRAM(s) Oral every 12 hours  levETIRAcetam  Solution 500 milliGRAM(s) Oral every 12 hours  melatonin 3 milliGRAM(s) Oral at bedtime  meropenem  IVPB 1000 milliGRAM(s) IV Intermittent every 8 hours  midodrine. 20 milliGRAM(s) Oral three times a day  norepinephrine Infusion 0.05 MICROgram(s)/kG/Min IV Continuous <Continuous>  pantoprazole  Injectable 40 milliGRAM(s) IV Push every 24 hours  polyethylene glycol 3350 17 Gram(s) Oral at bedtime  raloxifene 60 milliGRAM(s) Oral daily  scopolamine 1 mG/72 Hr(s) Patch 1 Patch Transdermal every 72 hours  senna 2 Tablet(s) Oral at bedtime  silver sulfADIAZINE 1% Cream 1 Application(s) Topical two times a day  sodium chloride 0.65% Nasal 2 Spray(s) Both Nostrils three times a day    PRN Meds  acetaminophen     Tablet .. 650 milliGRAM(s) Oral once PRN  acetaminophen     Tablet .. 650 milliGRAM(s) Oral every 6 hours PRN  aluminum hydroxide/magnesium hydroxide/simethicone Suspension 30 milliLiter(s) Oral every 4 hours PRN  ondansetron Injectable 4 milliGRAM(s) IV Push every 8 hours PRN

## 2024-02-12 NOTE — SWALLOW BEDSIDE ASSESSMENT ADULT - SLP PERTINENT HISTORY OF CURRENT PROBLEM
Pt is a 57 y/o F w/ PMHx: Down Syndrome, nonverbal at baseline, DVT on Eliquis 5mg BID, hypothyroidism, aspiration pneumonia in prev admissions, cerebral palsy and seizure d/o, presents to the ED from [facility] for respiratory distress and high grade fever. Pt found to be septic on admission, AHRF 2' gram negative PNA, severe sepsis on admission. Pt well known to acute SLP services, hx of multiple instrumental swallow studies, most recently FEES 10/24/23, recs: puree, mildly thick liquids. Pt eventually upgraded at bedside to thin liquids when respiratory status improved.
Pt is a 57 y/o F w/ PMHx: Down Syndrome, nonverbal at baseline, DVT on Eliquis 5mg BID, hypothyroidism, aspiration pneumonia in prev admissions, cerebral palsy and seizure d/o, presents to the ED from [facility] for respiratory distress and high grade fever. Pt found to be septic on admission, AHRF 2' gram negative PNA, severe sepsis on admission. Pt well known to acute SLP services, hx of multiple instrumental swallow studies, most recently FEES 10/24/23, recs: puree, mildly thick liquids. Pt eventually upgraded at bedside to thin liquids when respiratory status improved.
Pt is a 57 y/o F w/ PMHx: Down Syndrome, nonverbal at baseline, DVT on Eliquis 5mg BID, hypothyroidism, aspiration pneumonia in prev admissions, cerebral palsy and seizure d/o, presents to the ED from [facility] for respiratory distress and high grade fever. Pt found to be septic on admission, AHRF 2' gram negative PNA, severe sepsis on admission. Pt well known to acute SLP services, hx of multiple instrumental swallow studies, most recently FEES 10/24/23, recs: puree, mildly thick liquids. Pt eventually upgraded at bedside to thin liquids when respiratory status improved.
Pt is a 57 y/o F w/ PMHx: Down Syndrome, nonverbal at baseline, DVT on Eliquis 5mg BID, hypothyroidism, aspiration pneumonia in prev admissions, cerebral palsy and seizure d/o, presents to the ED from [facility] for respiratory distress and high grade fever. Pt found to be septic on admission, AHRF 2' aspiration PNA or RSV bronchitis. Pt well known to acute SLP services, hx of multiple instrumental swallow studies, most recently FEES 10/24/23, recs: puree, mildly thick liquids. Pt eventually upgraded at bedside to thin liquids when respiratory status improved.
58F w/ PMHx Down Syndrome, nonverbal at baseline, Hypothyroidism, Cerebral palsy and Seizure Disorders presents to the ED from nursing facility/group home ([facility]) presented to ED for respiratory distress and high grade fever. Patient found to be septic on admission. As per aide Janette at bedside, patient had been coughing and congested for 3x days. Denies fevers, chills, n/v/d or pain prior to admission. Patient is on a puree diet at baseline.
58F w/ PMHx Down Syndrome, nonverbal at baseline, Hypothyroidism, Cerebral palsy and Seizure Disorders presents to the ED from nursing facility/group home ([facility]) presented to ED for respiratory distress and high grade fever. Patient found to be septic on admission. As per aide Janette at bedside, patient had been coughing and congested for 3x days. Denies fevers, chills, n/v/d or pain prior to admission. Patient is on a puree diet at baseline.
Pt is a 57 y/o F w/ PMHx: Down Syndrome, nonverbal at baseline, DVT on Eliquis 5mg BID, hypothyroidism, aspiration pneumonia in prev admissions, cerebral palsy and seizure d/o, presents to the ED from [facility] for respiratory distress and high grade fever. Pt found to be septic on admission, AHRF 2' aspiration PNA or RSV bronchitis. Pt well known to acute SLP services, hx of multiple instrumental swallow studies, most recently FEES 10/24/23, recs: puree, mildly thick liquids. Pt eventually upgraded at bedside to thin liquids when respiratory status improved.
Pt is a 57 y/o F w/ PMHx: Down Syndrome, nonverbal at baseline, DVT on Eliquis 5mg BID, hypothyroidism, aspiration pneumonia in prev admissions, cerebral palsy and seizure d/o, presents to the ED from [facility] for respiratory distress and high grade fever. Pt found to be septic on admission, AHRF 2' gram negative PNA, severe sepsis on admission. Pt well known to acute SLP services, hx of multiple instrumental swallow studies, most recently FEES 10/24/23, recs: puree, mildly thick liquids. Pt eventually upgraded at bedside to thin liquids when respiratory status improved.
Pt is a 59 y/o F w/ PMHx: Down Syndrome, nonverbal at baseline, DVT on Eliquis 5mg BID, hypothyroidism, aspiration pneumonia in prev admissions, cerebral palsy and seizure d/o, presents to the ED from [facility] for respiratory distress and high grade fever. Pt found to be septic on admission, AHRF 2' gram negative PNA, severe sepsis on admission. Pt well known to acute SLP services, hx of multiple instrumental swallow studies, most recently FEES 10/24/23, recs: puree, mildly thick liquids. Pt eventually upgraded at bedside to thin liquids when respiratory status improved.

## 2024-02-12 NOTE — PROGRESS NOTE ADULT - SUBJECTIVE AND OBJECTIVE BOX
Patient is a 58y old  Female who presents with a chief complaint of Respiratory Distress (12 Feb 2024 08:11)      Over Night Events:       ROS:     All ROS are negative except HPI         PHYSICAL EXAM    ICU Vital Signs Last 24 Hrs  T(C): 38 (12 Feb 2024 00:00), Max: 38 (12 Feb 2024 00:00)  T(F): 100.4 (12 Feb 2024 00:00), Max: 100.4 (12 Feb 2024 00:00)  HR: 83 (12 Feb 2024 04:00) (64 - 105)  BP: 90/53 (12 Feb 2024 04:00) (86/51 - 119/56)  BP(mean): 66 (12 Feb 2024 04:00) (61 - 81)  ABP: --  ABP(mean): --  RR: 18 (12 Feb 2024 00:00) (18 - 18)  SpO2: 98% (12 Feb 2024 04:00) (95% - 100%)    O2 Parameters below as of 11 Feb 2024 20:15  Patient On (Oxygen Delivery Method): nasal cannula, high flow  O2 Flow (L/min): 60  O2 Concentration (%): 100        CONSTITUTIONAL:  Ill appearing, lethargic     CARDIAC:   Normal rate,   Regular rhythm.    No edema    RESPIRATORY:   BL crackles    GASTROINTESTINAL:  Abdomen soft,   Non-tender,   No guarding,       NEUROLOGICAL:   Does not follow commands    SKIN:   Stage 1 sacral ulcer        02-11-24 @ 07:01  -  02-12-24 @ 07:00  --------------------------------------------------------  IN:    Jevity 1.2: 1200 mL    Norepinephrine: 158.4 mL  Total IN: 1358.4 mL    OUT:    Voided (mL): 1100 mL  Total OUT: 1100 mL    Total NET: 258.4 mL      02-12-24 @ 07:01  -  02-12-24 @ 08:32  --------------------------------------------------------  IN:    Norepinephrine: 9.8 mL  Total IN: 9.8 mL    OUT:  Total OUT: 0 mL    Total NET: 9.8 mL          LABS:                            9.0    12.27 )-----------( 558      ( 12 Feb 2024 06:37 )             30.1                                               02-12    139  |  100  |  14  ----------------------------<  217<H>  5.0   |  29  |  0.5<L>    Ca    8.4      12 Feb 2024 06:37  Mg     2.5     02-12    TPro  6.1  /  Alb  2.7<L>  /  TBili  0.2  /  DBili  x   /  AST  18  /  ALT  15  /  AlkPhos  90  02-12                                             Urinalysis Basic - ( 12 Feb 2024 06:37 )    Color: x / Appearance: x / SG: x / pH: x  Gluc: 217 mg/dL / Ketone: x  / Bili: x / Urobili: x   Blood: x / Protein: x / Nitrite: x   Leuk Esterase: x / RBC: x / WBC x   Sq Epi: x / Non Sq Epi: x / Bacteria: x                                                  LIVER FUNCTIONS - ( 12 Feb 2024 06:37 )  Alb: 2.7 g/dL / Pro: 6.1 g/dL / ALK PHOS: 90 U/L / ALT: 15 U/L / AST: 18 U/L / GGT: x                                                  Culture - Blood (collected 09 Feb 2024 11:57)  Source: .Blood None  Preliminary Report (11 Feb 2024 22:01):    No growth at 48 Hours    Culture - Blood (collected 09 Feb 2024 11:57)  Source: .Blood None  Preliminary Report (11 Feb 2024 22:01):    No growth at 48 Hours                                                                                       ABG - ( 10 Feb 2024 08:46 )  pH, Arterial: 7.49  pH, Blood: x     /  pCO2: 43    /  pO2: 154   / HCO3: 33    / Base Excess: 8.5   /  SaO2: 99.0                MEDICATIONS  (STANDING):  albuterol/ipratropium for Nebulization 3 milliLiter(s) Nebulizer <User Schedule>  artificial  tears Solution 1 Drop(s) Both EYES two times a day  calcium carbonate   1250 mG (OsCal) 1 Tablet(s) Oral two times a day  caspofungin IVPB      caspofungin IVPB 50 milliGRAM(s) IV Intermittent every 24 hours  chlorhexidine 2% Cloths 1 Application(s) Topical daily  enoxaparin Injectable 50 milliGRAM(s) SubCutaneous every 12 hours  gabapentin 300 milliGRAM(s) Oral every 12 hours  levETIRAcetam  Solution 500 milliGRAM(s) Oral every 12 hours  melatonin 3 milliGRAM(s) Oral at bedtime  meropenem  IVPB 1000 milliGRAM(s) IV Intermittent every 8 hours  midodrine. 20 milliGRAM(s) Oral three times a day  pantoprazole  Injectable 40 milliGRAM(s) IV Push every 24 hours  polyethylene glycol 3350 17 Gram(s) Oral at bedtime  raloxifene 60 milliGRAM(s) Oral daily  scopolamine 1 mG/72 Hr(s) Patch 1 Patch Transdermal every 72 hours  senna 2 Tablet(s) Oral at bedtime  silver sulfADIAZINE 1% Cream 1 Application(s) Topical two times a day  sodium chloride 0.65% Nasal 2 Spray(s) Both Nostrils three times a day    MEDICATIONS  (PRN):  acetaminophen     Tablet .. 650 milliGRAM(s) Oral once PRN Temp greater or equal to 38.5C (101.3F)  acetaminophen     Tablet .. 650 milliGRAM(s) Oral every 6 hours PRN Temp greater or equal to 38C (100.4F), Mild Pain (1 - 3)  aluminum hydroxide/magnesium hydroxide/simethicone Suspension 30 milliLiter(s) Oral every 4 hours PRN Dyspepsia  ondansetron Injectable 4 milliGRAM(s) IV Push every 8 hours PRN Nausea and/or Vomiting      New X-rays reviewed:                                                                                  ECHO    CXR interpreted by me:       Patient is a 58y old  Female who presents with a chief complaint of Respiratory Distress (12 Feb 2024 08:11)      Over Night Events: Remains critically ill on HFNCO2.  Off pressors      ROS:     All ROS are negative except HPI         PHYSICAL EXAM    ICU Vital Signs Last 24 Hrs  T(C): 38 (12 Feb 2024 00:00), Max: 38 (12 Feb 2024 00:00)  T(F): 100.4 (12 Feb 2024 00:00), Max: 100.4 (12 Feb 2024 00:00)  HR: 83 (12 Feb 2024 04:00) (64 - 105)  BP: 90/53 (12 Feb 2024 04:00) (86/51 - 119/56)  BP(mean): 66 (12 Feb 2024 04:00) (61 - 81)  ABP: --  ABP(mean): --  RR: 18 (12 Feb 2024 00:00) (18 - 18)  SpO2: 98% (12 Feb 2024 04:00) (95% - 100%)    O2 Parameters below as of 11 Feb 2024 20:15  Patient On (Oxygen Delivery Method): nasal cannula, high flow  O2 Flow (L/min): 60  O2 Concentration (%): 100        CONSTITUTIONAL:  Ill appearing, lethargic     CARDIAC:   Normal rate,   Regular rhythm.    No edema    RESPIRATORY:   BL crackles    GASTROINTESTINAL:  Abdomen soft,   Non-tender,   No guarding,       NEUROLOGICAL:   Does not follow commands    SKIN:   Stage 1 sacral ulcer        02-11-24 @ 07:01  -  02-12-24 @ 07:00  --------------------------------------------------------  IN:    Jevity 1.2: 1200 mL    Norepinephrine: 158.4 mL  Total IN: 1358.4 mL    OUT:    Voided (mL): 1100 mL  Total OUT: 1100 mL    Total NET: 258.4 mL      02-12-24 @ 07:01  -  02-12-24 @ 08:32  --------------------------------------------------------  IN:    Norepinephrine: 9.8 mL  Total IN: 9.8 mL    OUT:  Total OUT: 0 mL    Total NET: 9.8 mL          LABS:                            9.0    12.27 )-----------( 558      ( 12 Feb 2024 06:37 )             30.1                                               02-12    139  |  100  |  14  ----------------------------<  217<H>  5.0   |  29  |  0.5<L>    Ca    8.4      12 Feb 2024 06:37  Mg     2.5     02-12    TPro  6.1  /  Alb  2.7<L>  /  TBili  0.2  /  DBili  x   /  AST  18  /  ALT  15  /  AlkPhos  90  02-12                                             Urinalysis Basic - ( 12 Feb 2024 06:37 )    Color: x / Appearance: x / SG: x / pH: x  Gluc: 217 mg/dL / Ketone: x  / Bili: x / Urobili: x   Blood: x / Protein: x / Nitrite: x   Leuk Esterase: x / RBC: x / WBC x   Sq Epi: x / Non Sq Epi: x / Bacteria: x                                                  LIVER FUNCTIONS - ( 12 Feb 2024 06:37 )  Alb: 2.7 g/dL / Pro: 6.1 g/dL / ALK PHOS: 90 U/L / ALT: 15 U/L / AST: 18 U/L / GGT: x                                                  Culture - Blood (collected 09 Feb 2024 11:57)  Source: .Blood None  Preliminary Report (11 Feb 2024 22:01):    No growth at 48 Hours    Culture - Blood (collected 09 Feb 2024 11:57)  Source: .Blood None  Preliminary Report (11 Feb 2024 22:01):    No growth at 48 Hours                                                                                       ABG - ( 10 Feb 2024 08:46 )  pH, Arterial: 7.49  pH, Blood: x     /  pCO2: 43    /  pO2: 154   / HCO3: 33    / Base Excess: 8.5   /  SaO2: 99.0                MEDICATIONS  (STANDING):  albuterol/ipratropium for Nebulization 3 milliLiter(s) Nebulizer <User Schedule>  artificial  tears Solution 1 Drop(s) Both EYES two times a day  calcium carbonate   1250 mG (OsCal) 1 Tablet(s) Oral two times a day  caspofungin IVPB      caspofungin IVPB 50 milliGRAM(s) IV Intermittent every 24 hours  chlorhexidine 2% Cloths 1 Application(s) Topical daily  enoxaparin Injectable 50 milliGRAM(s) SubCutaneous every 12 hours  gabapentin 300 milliGRAM(s) Oral every 12 hours  levETIRAcetam  Solution 500 milliGRAM(s) Oral every 12 hours  melatonin 3 milliGRAM(s) Oral at bedtime  meropenem  IVPB 1000 milliGRAM(s) IV Intermittent every 8 hours  midodrine. 20 milliGRAM(s) Oral three times a day  pantoprazole  Injectable 40 milliGRAM(s) IV Push every 24 hours  polyethylene glycol 3350 17 Gram(s) Oral at bedtime  raloxifene 60 milliGRAM(s) Oral daily  scopolamine 1 mG/72 Hr(s) Patch 1 Patch Transdermal every 72 hours  senna 2 Tablet(s) Oral at bedtime  silver sulfADIAZINE 1% Cream 1 Application(s) Topical two times a day  sodium chloride 0.65% Nasal 2 Spray(s) Both Nostrils three times a day    MEDICATIONS  (PRN):  acetaminophen     Tablet .. 650 milliGRAM(s) Oral once PRN Temp greater or equal to 38.5C (101.3F)  acetaminophen     Tablet .. 650 milliGRAM(s) Oral every 6 hours PRN Temp greater or equal to 38C (100.4F), Mild Pain (1 - 3)  aluminum hydroxide/magnesium hydroxide/simethicone Suspension 30 milliLiter(s) Oral every 4 hours PRN Dyspepsia  ondansetron Injectable 4 milliGRAM(s) IV Push every 8 hours PRN Nausea and/or Vomiting      New X-rays reviewed:                                                                                  ECHO    CXR interpreted by me:

## 2024-02-12 NOTE — SWALLOW BEDSIDE ASSESSMENT ADULT - SLP GENERAL OBSERVATIONS
pt received in bed awake +generalized weakness in no apparent pain. +HFNC 59L/min, 88% O2, +15L NRB; +GH aide at bedside

## 2024-02-12 NOTE — SWALLOW BEDSIDE ASSESSMENT ADULT - SPECIFY REASON(S)
NPO
Patient resting in bed, NAD. Respirations even and unlabored. Will continue to monitor    
dysphagia f/u
f/u

## 2024-02-12 NOTE — SWALLOW BEDSIDE ASSESSMENT ADULT - NS ASR SWALLOW FINDINGS DISCUS
GH aide/Physician/Nursing
Nursing
Nursing
GH aide/Nursing
GH aide/Physician/Nursing
GH aide/Physician
Physician/Nursing
Nursing/Patient

## 2024-02-12 NOTE — SWALLOW BEDSIDE ASSESSMENT ADULT - DATE
29-Jan-2024
06-Feb-2024
12-Feb-2024
05-Feb-2024
01-Feb-2024
02-Feb-2024
21-Jan-2024
25-Jan-2024
31-Jan-2024

## 2024-02-13 LAB
ANION GAP SERPL CALC-SCNC: 12 MMOL/L — SIGNIFICANT CHANGE UP (ref 7–14)
BASOPHILS # BLD AUTO: 0.08 K/UL — SIGNIFICANT CHANGE UP (ref 0–0.2)
BASOPHILS NFR BLD AUTO: 0.7 % — SIGNIFICANT CHANGE UP (ref 0–1)
BUN SERPL-MCNC: 15 MG/DL — SIGNIFICANT CHANGE UP (ref 10–20)
CALCIUM SERPL-MCNC: 8.3 MG/DL — LOW (ref 8.4–10.4)
CHLORIDE SERPL-SCNC: 98 MMOL/L — SIGNIFICANT CHANGE UP (ref 98–110)
CO2 SERPL-SCNC: 30 MMOL/L — SIGNIFICANT CHANGE UP (ref 17–32)
CREAT SERPL-MCNC: 0.5 MG/DL — LOW (ref 0.7–1.5)
EGFR: 109 ML/MIN/1.73M2 — SIGNIFICANT CHANGE UP
EOSINOPHIL # BLD AUTO: 0.37 K/UL — SIGNIFICANT CHANGE UP (ref 0–0.7)
EOSINOPHIL NFR BLD AUTO: 3 % — SIGNIFICANT CHANGE UP (ref 0–8)
GALACTOMANNAN AG SERPL-ACNC: 0.05 INDEX — SIGNIFICANT CHANGE UP (ref 0–0.49)
GLUCOSE SERPL-MCNC: 121 MG/DL — HIGH (ref 70–99)
HCT VFR BLD CALC: 28.9 % — LOW (ref 37–47)
HGB BLD-MCNC: 8.5 G/DL — LOW (ref 12–16)
IMM GRANULOCYTES NFR BLD AUTO: 0.4 % — HIGH (ref 0.1–0.3)
LYMPHOCYTES # BLD AUTO: 1.73 K/UL — SIGNIFICANT CHANGE UP (ref 1.2–3.4)
LYMPHOCYTES # BLD AUTO: 14.2 % — LOW (ref 20.5–51.1)
MAGNESIUM SERPL-MCNC: 2.5 MG/DL — HIGH (ref 1.8–2.4)
MCHC RBC-ENTMCNC: 23.7 PG — LOW (ref 27–31)
MCHC RBC-ENTMCNC: 29.4 G/DL — LOW (ref 32–37)
MCV RBC AUTO: 80.5 FL — LOW (ref 81–99)
MONOCYTES # BLD AUTO: 0.87 K/UL — HIGH (ref 0.1–0.6)
MONOCYTES NFR BLD AUTO: 7.2 % — SIGNIFICANT CHANGE UP (ref 1.7–9.3)
NEUTROPHILS # BLD AUTO: 9.06 K/UL — HIGH (ref 1.4–6.5)
NEUTROPHILS NFR BLD AUTO: 74.5 % — SIGNIFICANT CHANGE UP (ref 42.2–75.2)
NRBC # BLD: 0 /100 WBCS — SIGNIFICANT CHANGE UP (ref 0–0)
PLATELET # BLD AUTO: 624 K/UL — HIGH (ref 130–400)
PMV BLD: 11 FL — HIGH (ref 7.4–10.4)
POTASSIUM SERPL-MCNC: 4.8 MMOL/L — SIGNIFICANT CHANGE UP (ref 3.5–5)
POTASSIUM SERPL-SCNC: 4.8 MMOL/L — SIGNIFICANT CHANGE UP (ref 3.5–5)
PROCALCITONIN SERPL-MCNC: 0.1 NG/ML — SIGNIFICANT CHANGE UP (ref 0.02–0.1)
RAPID RVP RESULT: SIGNIFICANT CHANGE UP
RBC # BLD: 3.59 M/UL — LOW (ref 4.2–5.4)
RBC # FLD: 21.5 % — HIGH (ref 11.5–14.5)
SARS-COV-2 RNA SPEC QL NAA+PROBE: SIGNIFICANT CHANGE UP
SODIUM SERPL-SCNC: 140 MMOL/L — SIGNIFICANT CHANGE UP (ref 135–146)
WBC # BLD: 12.16 K/UL — HIGH (ref 4.8–10.8)
WBC # FLD AUTO: 12.16 K/UL — HIGH (ref 4.8–10.8)

## 2024-02-13 PROCEDURE — 99291 CRITICAL CARE FIRST HOUR: CPT

## 2024-02-13 PROCEDURE — 71045 X-RAY EXAM CHEST 1 VIEW: CPT | Mod: 26

## 2024-02-13 PROCEDURE — 99233 SBSQ HOSP IP/OBS HIGH 50: CPT

## 2024-02-13 RX ADMIN — Medication 3 MILLILITER(S): at 08:36

## 2024-02-13 RX ADMIN — Medication 4 MILLILITER(S): at 08:36

## 2024-02-13 RX ADMIN — Medication 3 MILLILITER(S): at 23:15

## 2024-02-13 RX ADMIN — Medication 4 MILLILITER(S): at 13:44

## 2024-02-13 RX ADMIN — MEROPENEM 100 MILLIGRAM(S): 1 INJECTION INTRAVENOUS at 05:04

## 2024-02-13 RX ADMIN — Medication 3 MILLILITER(S): at 13:43

## 2024-02-13 RX ADMIN — Medication 2 SPRAY(S): at 21:10

## 2024-02-13 RX ADMIN — Medication 3 MILLILITER(S): at 20:12

## 2024-02-13 RX ADMIN — Medication 4.9 MICROGRAM(S)/KG/MIN: at 05:07

## 2024-02-13 RX ADMIN — Medication 2 SPRAY(S): at 05:09

## 2024-02-13 RX ADMIN — Medication 4 MILLILITER(S): at 16:38

## 2024-02-13 RX ADMIN — GABAPENTIN 300 MILLIGRAM(S): 400 CAPSULE ORAL at 05:06

## 2024-02-13 RX ADMIN — Medication 3 MILLIGRAM(S): at 21:09

## 2024-02-13 RX ADMIN — MIDODRINE HYDROCHLORIDE 20 MILLIGRAM(S): 2.5 TABLET ORAL at 05:06

## 2024-02-13 RX ADMIN — LEVETIRACETAM 500 MILLIGRAM(S): 250 TABLET, FILM COATED ORAL at 05:05

## 2024-02-13 RX ADMIN — Medication 650 MILLIGRAM(S): at 08:41

## 2024-02-13 RX ADMIN — Medication 2 SPRAY(S): at 13:15

## 2024-02-13 RX ADMIN — CHLORHEXIDINE GLUCONATE 1 APPLICATION(S): 213 SOLUTION TOPICAL at 13:25

## 2024-02-13 RX ADMIN — MIDODRINE HYDROCHLORIDE 20 MILLIGRAM(S): 2.5 TABLET ORAL at 13:14

## 2024-02-13 RX ADMIN — Medication 1 TABLET(S): at 05:07

## 2024-02-13 RX ADMIN — MEROPENEM 100 MILLIGRAM(S): 1 INJECTION INTRAVENOUS at 21:09

## 2024-02-13 RX ADMIN — Medication 3 MILLILITER(S): at 16:38

## 2024-02-13 RX ADMIN — Medication 1 DROP(S): at 05:08

## 2024-02-13 RX ADMIN — Medication 4 MILLILITER(S): at 23:15

## 2024-02-13 RX ADMIN — CASPOFUNGIN ACETATE 260 MILLIGRAM(S): 7 INJECTION, POWDER, LYOPHILIZED, FOR SOLUTION INTRAVENOUS at 13:26

## 2024-02-13 RX ADMIN — SODIUM CHLORIDE 4 MILLILITER(S): 9 INJECTION INTRAMUSCULAR; INTRAVENOUS; SUBCUTANEOUS at 13:44

## 2024-02-13 RX ADMIN — PANTOPRAZOLE SODIUM 40 MILLIGRAM(S): 20 TABLET, DELAYED RELEASE ORAL at 05:05

## 2024-02-13 RX ADMIN — Medication 4 MILLILITER(S): at 20:13

## 2024-02-13 RX ADMIN — SODIUM CHLORIDE 4 MILLILITER(S): 9 INJECTION INTRAMUSCULAR; INTRAVENOUS; SUBCUTANEOUS at 16:30

## 2024-02-13 RX ADMIN — MEROPENEM 100 MILLIGRAM(S): 1 INJECTION INTRAVENOUS at 13:23

## 2024-02-13 RX ADMIN — RALOXIFENE HYDROCHLORIDE 60 MILLIGRAM(S): 60 TABLET, COATED ORAL at 13:15

## 2024-02-13 RX ADMIN — Medication 1 APPLICATION(S): at 05:05

## 2024-02-13 RX ADMIN — ENOXAPARIN SODIUM 50 MILLIGRAM(S): 100 INJECTION SUBCUTANEOUS at 05:05

## 2024-02-13 NOTE — PROGRESS NOTE ADULT - ASSESSMENT
IMPRESSION:    Acute hypoxemic respiratory failure  Likely aspiration pneumonia   Sepsis POA  Septic shock  on Levophed   Recurrent aspiration pneumonia / prior intubation  HO DVT on Eliquis   HO GI bleed  HO OM  HO recent duodenal perforation   HO polymicrobial bacteremia   H/o CP, DS  H/o seizures    PLAN:    CNS:  Avoid CNS Depressant, AED. MS at baseline.     HEENT: Oral care.     PULMONARY: HOB at 45 degrees.  Aspiration precaution. Wean FiO2 Keep spo2 more than 92%.  Daily CXR. Aggressive pulm toilet, frequent suctioning.  Might need MV. Chest vest    CARDIOVASCULAR: Keep MAP more than 60. Avoid overload. Wean levophed as able. C/w midodrine 20mg.  Goal directed fluid resuscitation. IVC today bedside    GI: Protonix. NG  feeding. Speech and swallow recs noted. Might need PEG when more stable    INFECTIOUS DISEASE:   c/w Meropenem.  Finish empiric Caspo course 2/18. F/u blood Cx, serum galactomannan. Histo Ag negative,    HEMATOLOGICAL:  Lovenox therapeutic.  Monitor CBC     ENDOCRINE:  Follow up FS.  Insulin protocol if needed.    MUSCULOSKELETAL: Bedrest.  Off loading.  Wound care.      Prognosis very poor.    Palliative care following    SDU.

## 2024-02-13 NOTE — PROGRESS NOTE ADULT - SUBJECTIVE AND OBJECTIVE BOX
Patient is a 58y old  Female who presents with a chief complaint of Respiratory Distress (13 Feb 2024 08:06)        Over Night Events: vanc was d/cd per ID, still on LEVO, being weaned. Oxygen requirements are the same.           ROS:     All ROS are negative except HPI         PHYSICAL EXAM    ICU Vital Signs Last 24 Hrs  T(C): 36.6 (13 Feb 2024 04:00), Max: 37.4 (12 Feb 2024 12:00)  T(F): 97.9 (13 Feb 2024 04:00), Max: 99.4 (12 Feb 2024 12:00)  HR: 84 (13 Feb 2024 04:00) (57 - 84)  BP: 95/50 (13 Feb 2024 04:00) (91/46 - 111/57)  BP(mean): 63 (13 Feb 2024 04:00) (63 - 78)  ABP: --  ABP(mean): --  RR: 20 (13 Feb 2024 04:00) (18 - 20)  SpO2: 93% (13 Feb 2024 04:00) (93% - 100%)    O2 Parameters below as of 13 Feb 2024 04:00  Patient On (Oxygen Delivery Method): nasal cannula, high flow  O2 Flow (L/min): 90  O2 Concentration (%): 60        CONSTITUTIONAL:  Ill appearing     CARDIAC:   Normal rate,   Regular rhythm.    No edema    RESPIRATORY:   No wheezing  Bilateral crackles     GASTROINTESTINAL:  Abdomen soft,   Non-tender,   No guarding,     MUSCULOSKELETAL:   Range of motion is not limited,  No clubbing, cyanosis    NEUROLOGICAL:   does not follow commands     SKIN:   stage 1 sacral ulcer         02-12-24 @ 07:01  -  02-13-24 @ 07:00  --------------------------------------------------------  IN:    Enteral Tube Flush: 300 mL    Enteral Tube Flush: 500 mL    Jevity 1.2: 1500 mL    Norepinephrine: 9.8 mL    Norepinephrine: 101.4 mL  Total IN: 2411.2 mL    OUT:    Voided (mL): 1400 mL  Total OUT: 1400 mL    Total NET: 1011.2 mL          LABS:                            9.0    12.27 )-----------( 558      ( 12 Feb 2024 06:37 )             30.1                                               02-12    139  |  100  |  14  ----------------------------<  217<H>  5.0   |  29  |  0.5<L>    Ca    8.4      12 Feb 2024 06:37  Mg     2.5     02-12    TPro  6.1  /  Alb  2.7<L>  /  TBili  0.2  /  DBili  x   /  AST  18  /  ALT  15  /  AlkPhos  90  02-12                                             Urinalysis Basic - ( 12 Feb 2024 06:37 )    Color: x / Appearance: x / SG: x / pH: x  Gluc: 217 mg/dL / Ketone: x  / Bili: x / Urobili: x   Blood: x / Protein: x / Nitrite: x   Leuk Esterase: x / RBC: x / WBC x   Sq Epi: x / Non Sq Epi: x / Bacteria: x                                                  LIVER FUNCTIONS - ( 12 Feb 2024 06:37 )  Alb: 2.7 g/dL / Pro: 6.1 g/dL / ALK PHOS: 90 U/L / ALT: 15 U/L / AST: 18 U/L / GGT: x                                                                                                                                       MEDICATIONS  (STANDING):  acetylcysteine 20%  Inhalation 4 milliLiter(s) Inhalation every 4 hours  albuterol/ipratropium for Nebulization 3 milliLiter(s) Nebulizer every 4 hours  artificial  tears Solution 1 Drop(s) Both EYES two times a day  calcium carbonate   1250 mG (OsCal) 1 Tablet(s) Oral two times a day  caspofungin IVPB 50 milliGRAM(s) IV Intermittent every 24 hours  caspofungin IVPB      chlorhexidine 2% Cloths 1 Application(s) Topical daily  enoxaparin Injectable 50 milliGRAM(s) SubCutaneous every 12 hours  gabapentin 300 milliGRAM(s) Oral every 12 hours  levETIRAcetam  Solution 500 milliGRAM(s) Oral every 12 hours  melatonin 3 milliGRAM(s) Oral at bedtime  meropenem  IVPB 1000 milliGRAM(s) IV Intermittent every 8 hours  midodrine. 20 milliGRAM(s) Oral three times a day  norepinephrine Infusion 0.05 MICROgram(s)/kG/Min (4.9 mL/Hr) IV Continuous <Continuous>  pantoprazole  Injectable 40 milliGRAM(s) IV Push every 24 hours  polyethylene glycol 3350 17 Gram(s) Oral at bedtime  raloxifene 60 milliGRAM(s) Oral daily  scopolamine 1 mG/72 Hr(s) Patch 1 Patch Transdermal every 72 hours  senna 2 Tablet(s) Oral at bedtime  silver sulfADIAZINE 1% Cream 1 Application(s) Topical two times a day  sodium chloride 0.65% Nasal 2 Spray(s) Both Nostrils three times a day  sodium chloride 3%  Inhalation 4 milliLiter(s) Inhalation every 4 hours    MEDICATIONS  (PRN):  acetaminophen     Tablet .. 650 milliGRAM(s) Oral every 6 hours PRN Temp greater or equal to 38C (100.4F), Mild Pain (1 - 3)  aluminum hydroxide/magnesium hydroxide/simethicone Suspension 30 milliLiter(s) Oral every 4 hours PRN Dyspepsia  ondansetron Injectable 4 milliGRAM(s) IV Push every 8 hours PRN Nausea and/or Vomiting      New X-rays reviewed:                                                                                  ECHO      Patient is a 58y old  Female who presents with a chief complaint of Respiratory Distress (13 Feb 2024 08:06)        Over Night Events: remains critically ill on LEVO,   On HFNCO2.  .           ROS:     All ROS are negative except HPI         PHYSICAL EXAM    ICU Vital Signs Last 24 Hrs  T(C): 36.6 (13 Feb 2024 04:00), Max: 37.4 (12 Feb 2024 12:00)  T(F): 97.9 (13 Feb 2024 04:00), Max: 99.4 (12 Feb 2024 12:00)  HR: 84 (13 Feb 2024 04:00) (57 - 84)  BP: 95/50 (13 Feb 2024 04:00) (91/46 - 111/57)  BP(mean): 63 (13 Feb 2024 04:00) (63 - 78)  ABP: --  ABP(mean): --  RR: 20 (13 Feb 2024 04:00) (18 - 20)  SpO2: 93% (13 Feb 2024 04:00) (93% - 100%)    O2 Parameters below as of 13 Feb 2024 04:00  Patient On (Oxygen Delivery Method): nasal cannula, high flow  O2 Flow (L/min): 90  O2 Concentration (%): 60        CONSTITUTIONAL:  Ill appearing     CARDIAC:   Normal rate,   Regular rhythm.    No edema    RESPIRATORY:   No wheezing  Bilateral crackles     GASTROINTESTINAL:  Abdomen soft,   Non-tender,   No guarding,     MUSCULOSKELETAL:   Range of motion is not limited,  No clubbing, cyanosis    NEUROLOGICAL:   does not follow commands     SKIN:   stage 1 sacral ulcer         02-12-24 @ 07:01  -  02-13-24 @ 07:00  --------------------------------------------------------  IN:    Enteral Tube Flush: 300 mL    Enteral Tube Flush: 500 mL    Jevity 1.2: 1500 mL    Norepinephrine: 9.8 mL    Norepinephrine: 101.4 mL  Total IN: 2411.2 mL    OUT:    Voided (mL): 1400 mL  Total OUT: 1400 mL    Total NET: 1011.2 mL          LABS:                            9.0    12.27 )-----------( 558      ( 12 Feb 2024 06:37 )             30.1                                               02-12    139  |  100  |  14  ----------------------------<  217<H>  5.0   |  29  |  0.5<L>    Ca    8.4      12 Feb 2024 06:37  Mg     2.5     02-12    TPro  6.1  /  Alb  2.7<L>  /  TBili  0.2  /  DBili  x   /  AST  18  /  ALT  15  /  AlkPhos  90  02-12                                             Urinalysis Basic - ( 12 Feb 2024 06:37 )    Color: x / Appearance: x / SG: x / pH: x  Gluc: 217 mg/dL / Ketone: x  / Bili: x / Urobili: x   Blood: x / Protein: x / Nitrite: x   Leuk Esterase: x / RBC: x / WBC x   Sq Epi: x / Non Sq Epi: x / Bacteria: x                                                  LIVER FUNCTIONS - ( 12 Feb 2024 06:37 )  Alb: 2.7 g/dL / Pro: 6.1 g/dL / ALK PHOS: 90 U/L / ALT: 15 U/L / AST: 18 U/L / GGT: x                                                                                                                                       MEDICATIONS  (STANDING):  acetylcysteine 20%  Inhalation 4 milliLiter(s) Inhalation every 4 hours  albuterol/ipratropium for Nebulization 3 milliLiter(s) Nebulizer every 4 hours  artificial  tears Solution 1 Drop(s) Both EYES two times a day  calcium carbonate   1250 mG (OsCal) 1 Tablet(s) Oral two times a day  caspofungin IVPB 50 milliGRAM(s) IV Intermittent every 24 hours  caspofungin IVPB      chlorhexidine 2% Cloths 1 Application(s) Topical daily  enoxaparin Injectable 50 milliGRAM(s) SubCutaneous every 12 hours  gabapentin 300 milliGRAM(s) Oral every 12 hours  levETIRAcetam  Solution 500 milliGRAM(s) Oral every 12 hours  melatonin 3 milliGRAM(s) Oral at bedtime  meropenem  IVPB 1000 milliGRAM(s) IV Intermittent every 8 hours  midodrine. 20 milliGRAM(s) Oral three times a day  norepinephrine Infusion 0.05 MICROgram(s)/kG/Min (4.9 mL/Hr) IV Continuous <Continuous>  pantoprazole  Injectable 40 milliGRAM(s) IV Push every 24 hours  polyethylene glycol 3350 17 Gram(s) Oral at bedtime  raloxifene 60 milliGRAM(s) Oral daily  scopolamine 1 mG/72 Hr(s) Patch 1 Patch Transdermal every 72 hours  senna 2 Tablet(s) Oral at bedtime  silver sulfADIAZINE 1% Cream 1 Application(s) Topical two times a day  sodium chloride 0.65% Nasal 2 Spray(s) Both Nostrils three times a day  sodium chloride 3%  Inhalation 4 milliLiter(s) Inhalation every 4 hours    MEDICATIONS  (PRN):  acetaminophen     Tablet .. 650 milliGRAM(s) Oral every 6 hours PRN Temp greater or equal to 38C (100.4F), Mild Pain (1 - 3)  aluminum hydroxide/magnesium hydroxide/simethicone Suspension 30 milliLiter(s) Oral every 4 hours PRN Dyspepsia  ondansetron Injectable 4 milliGRAM(s) IV Push every 8 hours PRN Nausea and/or Vomiting      New X-rays reviewed:                                                                                  ECHO

## 2024-02-13 NOTE — PROGRESS NOTE ADULT - SUBJECTIVE AND OBJECTIVE BOX
Subjective:    Today is hospital day 24d. This morning patient was seen and examined at bedside, resting comfortably in bed.    No acute or major events overnight.      PAST MEDICAL & SURGICAL HISTORY  Down syndrome    Osteoporosis    Mild anemia    Neuropathy    S/P debridement  of R hip on 3/2/21    ALLERGIES:  No Known Allergies    MEDICATIONS:  STANDING MEDICATIONS  acetylcysteine 20%  Inhalation 4 milliLiter(s) Inhalation every 4 hours  albuterol/ipratropium for Nebulization 3 milliLiter(s) Nebulizer every 4 hours  artificial  tears Solution 1 Drop(s) Both EYES two times a day  calcium carbonate   1250 mG (OsCal) 1 Tablet(s) Oral two times a day  caspofungin IVPB      caspofungin IVPB 50 milliGRAM(s) IV Intermittent every 24 hours  chlorhexidine 2% Cloths 1 Application(s) Topical daily  enoxaparin Injectable 50 milliGRAM(s) SubCutaneous every 12 hours  gabapentin 300 milliGRAM(s) Oral every 12 hours  levETIRAcetam  Solution 500 milliGRAM(s) Oral every 12 hours  melatonin 3 milliGRAM(s) Oral at bedtime  meropenem  IVPB 1000 milliGRAM(s) IV Intermittent every 8 hours  midodrine. 20 milliGRAM(s) Oral three times a day  norepinephrine Infusion 0.05 MICROgram(s)/kG/Min IV Continuous <Continuous>  pantoprazole  Injectable 40 milliGRAM(s) IV Push every 24 hours  polyethylene glycol 3350 17 Gram(s) Oral at bedtime  raloxifene 60 milliGRAM(s) Oral daily  scopolamine 1 mG/72 Hr(s) Patch 1 Patch Transdermal every 72 hours  senna 2 Tablet(s) Oral at bedtime  silver sulfADIAZINE 1% Cream 1 Application(s) Topical two times a day  sodium chloride 0.65% Nasal 2 Spray(s) Both Nostrils three times a day  sodium chloride 3%  Inhalation 4 milliLiter(s) Inhalation every 4 hours    PRN MEDICATIONS  acetaminophen     Tablet .. 650 milliGRAM(s) Oral every 6 hours PRN  aluminum hydroxide/magnesium hydroxide/simethicone Suspension 30 milliLiter(s) Oral every 4 hours PRN  ondansetron Injectable 4 milliGRAM(s) IV Push every 8 hours PRN      Objective:  VITALS:   T(F): 99  HR: 63  BP: 88/44  RR: 20  SpO2: 99%    PHYSICAL EXAM:  GENERAL:  NAD chronically ill appearing, pale   PULMONARY: breath sounds B/L. No wheezing.   CVS: Normal S1, S2. Rate and Rhythm are regular.   ABDOMEN/GI: Soft, Nontender, mildly distended   NEUROLOGIC: opens eyes at times, does not follow commands   PSYCH: lethargic   NO LLE    LABS:  LABS FOR NEXT DAY : Yes (x ) No (  )                        8.5    12.16 )-----------( 624      ( 13 Feb 2024 06:26 )             28.9     02-13    140  |  98  |  15  ----------------------------<  121<H>  4.8   |  30  |  0.5<L>    Ca    8.3<L>      13 Feb 2024 06:26  Mg     2.5     02-13    TPro  6.1  /  Alb  2.7<L>  /  TBili  0.2  /  DBili  x   /  AST  18  /  ALT  15  /  AlkPhos  90  02-12

## 2024-02-13 NOTE — PROGRESS NOTE ADULT - ASSESSMENT
58F w/ PMHx Down Syndrome, nonverbal at baseline, Hypothyroidism, Cerebral palsy and Seizure Disorders [facility] presented to ED for respiratory distress and high grade fever. Patient found to be septic on admission.Admitted to SDU for management of acute respiratory distress 2/2 CAP/Aspiration PNA (20 Jan 2024 18:22)  While pt was on the floor she developed dyspnea after swallow evaluation, was spiking high grade fever, consulted by pulmonary/critical care and was upgraded back to SDU.    #Sepsis POA (Febrile, Tachycardic, Leukocytosis)  #Acute Hypoxic Respiratory Failure secondary to Aspiration PNA / RSV  #Hx of Dysphagia - Puree Diet at baseline  #Left lung almost complete collapse on 02.12.2024- improving  - On admission: VBG Lactate 2.1 improved to wnl, procal 0.04 pH wnl and CO2 mildly elevated 61  - inflammatory markers elevated, RVP is RSV +ve  - MRSA swab neg, urine strep neg, urine legionella neg  - D-dimer 605 and LE duplex neg  - BCx (1/20): Staph hominis -  contaminant repeat BCx have been NGTD  - UCx neg  - Failed FEES, s/p  NG tube for feedings and medications, patient will need PEG tube once improved. Recall GI once off HFNC   - Aspiration precautions, Chest PT vest , c/w nebs  - ID is following, recommendations noted:   - Repeat fungitell 63, indeterminate (Fungitell chronically positive, received Caspofungin on previous admission)  - c/w  Caspo 50mg q24h IV till 02.18  - c/w meropenem 1g q8h IV  - repeat procal 0.1 (02.12.2024 trending down)  - Removed RUE PIV, phlebitis on 02.12.2024  - DC Vanco per ID as MRSA is negative  -  histoplasma Ab serum negative   - needs CT A/P once more stable   - on HFNC/NRB Keep spo2 more than 92%. May need intubation   - remains on levophed 0.08 and midodrine 20 Q8H     #Hypernatremia - improved, cw free water to 250 Q4H   #H/o hypotension  - Midodrine dose increased to 20 mg Q 8 hours    - keep MAP above 60, taper off levophed as able     #Elevated Troponin (Stabilized)  #Sinus Tachycardia (Controlled)  - Elevated Trops and Tachycardia (sepsis)    - Troponins: 25>37>35>35  - c/w telemetry monitoring   - Repeat EKG: Normal sinus rhythm  - d/c Lopressor to 12.5mg BID    #Hx of Right Foot OM (1st Toe Stump and 2nd Distal Phalanx)  #Hx of Multiple Left Foot DTIs  #Hx of Sacral Wound  - Previous wound cultures positive for Proteus and ESBL E coli  - Patient underwent excisional debridement of ulcer of right foot (including 1st metatarsal) and partial amputation 2nd toe on previous admissions  - No acute surgical interventions from podiatry and burn  - C/w q2hr repositioning  - C/w local wound care  - Podiatry recalled 2/5 for foot wounds, updated recs placed    #Seizure Disorder  - c/w  Keppra 500 BID  - seizure precautions  - keep Mg above 2.0     #Hx of DVT of L Common Femoral Vein  - LE Duplex negative for DVT  - C/w Lovenox 50mg q12hr, HOLDING ELIQUIS    #Acute on Chronic Iron Deficiency Anemia (Stable)  - On admission: HgB 8.9 (previously 9.5)  - No evidence of hemorrhage  - B12 and Folate WNL normal  - Total Iron 15, Iron Saturation 6%, Ferritin 128 reflecting potential TIFFANI  - Hold off on iron supplementation in setting of infection  - Keep HgB >7    #Down Syndrome/  Cerebral Palsy  - supportive care  - prevent falls and aspiration   - failed speech and swallow, needs PEG as above  - C/w Gabapentin 300mg BID  - C/w Raloxifene 60mg QD    Full code, overall prognosis is very poor, continue monitoring in SDU  PENDING: CXR in am, CT chest AP once stable, wean Levophed, PEG consent pending advisory board, follow blood Cx

## 2024-02-13 NOTE — PROGRESS NOTE ADULT - ASSESSMENT
58F w/ PMHx Down Syndrome, nonverbal at baseline, Hypothyroidism, Cerebral palsy and Seizure Disorders presents to the ED from nursing facility/group home ([facility]) presented to ED for respiratory distress and high grade fever. Patient found to be septic on admission.Admitted to SDU for management of acute respiratory distress 2/2 CAP/Aspiration PNA (20 Jan 2024 18:22)  While pt was on the floor she developed dyspnea after swallow evaluation, was spiking high grade fever, consulted by pulmonary/critical care and was upgraded back to SDU.    Sepsis POA / Acute hypoxic resp failure / RSV bronchiolitis /  H/o Dysphagia/ suspected aspiration  - inflammatory markers elevated, RVP is negative, MRSA neg on admission   - BCx (1/20): Staph hominis -  contaminant repeat BCx have been NGTD  - Failed FEES, s/p  NG tube for feedings and medications, patient will need PEG tube once improved. Recall GI once off HFNC   - Aspiration precautions, Chest PT vest , c/w duonebs, today's xray noted  - ID is following, recommendations noted:   - c/w  Caspo 50mg q24h IV   - Repeat fungitell 63, indeterminate  - c/w meropenem 1g q8h IV  -  histoplasma Ab serum negative   - needs CT A/P once more stable,RVP/nasal MRSA neg again 2/12, fu repeat procal   - on HFNC/NRB Keep spo2 more than 92%. May need intubation   - remains on levophed 0.1 (previously on 0.07 )and midodrine 20 Q8H   - has been receiving IVF boluses PRN     Hypernatremia - resolved, cw free water to 250 Q4H     H/o hypotension  - Midodrine dose increased to 20 mg Q 8 hours    - keep MAP above 60, taper off levophed as able     Type 2 NSTEMI   - Elevated Trops and Tachycardia (sepsis)    - c/w telemetry monitoring   - Repeat EKG: Normal sinus rhythm  - c/w  metoprolol    Seizure Disorder  - c/w  Keppra   - seizure precautions  - keep Mg above 2.0     H/o lower ext DVT  - cw therapeutic Lovenox, holding Eliquis     Normocytic Anemia - stable    Down Syndrome/  Cerebral Palsy  - supportive care  - prevent falls and aspiration   - failed speech and swallow, needs PEG as above  - on Raloxifene     Full code, overall prognosis is very poor, continue monitoring in SDU     Patient seen at bedside, total time spent to evaluate and treat the patient's acute illness and chronic medical conditions as well as time spent reviewing labs, radiology, discussing medical plan with covering medical team was more than 55 minutes with >50% of time spent face to face with patient, discussing with patient/family as well as coordination of care            58F w/ PMHx Down Syndrome, nonverbal at baseline, Hypothyroidism, Cerebral palsy and Seizure Disorders presents to the ED from nursing facility/group home ([facility]) presented to ED for respiratory distress and high grade fever. Patient found to be septic on admission.Admitted to SDU for management of acute respiratory distress 2/2 CAP/Aspiration PNA (20 Jan 2024 18:22)  While pt was on the floor she developed dyspnea after swallow evaluation, was spiking high grade fever, consulted by pulmonary/critical care and was upgraded back to SDU.    Sepsis POA / Acute hypoxic resp failure / RSV bronchiolitis /  H/o Dysphagia/ suspected aspiration  - inflammatory markers elevated, RVP is negative, MRSA neg on admission   - BCx (1/20): Staph hominis -  contaminant repeat BCx have been NGTD  - Failed FEES, s/p  NG tube for feedings and medications, patient will need PEG tube once improved. Recall GI once off HFNC   - Aspiration precautions, Chest PT vest , c/w duonebs, today's xray noted  - ID is following, recommendations noted:   - c/w  Caspo 50mg q24h IV   - Repeat fungitell 63, indeterminate  - c/w meropenem 1g q8h IV  -  histoplasma Ab serum negative   - needs CT A/P once more stable,RVP/nasal MRSA neg again 2/12,  procal 0.10 on 2/12  - on HFNC/NRB Keep spo2 more than 92%. May need intubation   - remains on levophed 0.08 and midodrine 20 Q8H   - has been receiving IVF boluses PRN     Hypernatremia - resolved, cw free water to 250 Q4H     H/o hypotension  - Midodrine dose increased to 20 mg Q 8 hours    - keep MAP above 60, taper off levophed as able     Type 2 NSTEMI   - Elevated Trops and Tachycardia (sepsis)    - c/w telemetry monitoring   - Repeat EKG: Normal sinus rhythm  - c/w  metoprolol    Seizure Disorder  - c/w  Keppra   - seizure precautions  - keep Mg above 2.0     H/o lower ext DVT  - cw therapeutic Lovenox, holding Eliquis     Normocytic Anemia - stable    Down Syndrome/  Cerebral Palsy  - supportive care  - prevent falls and aspiration   - failed speech and swallow, needs PEG as above  - on Raloxifene     Full code, overall prognosis is very poor, continue monitoring in SDU     Patient seen at bedside, total time spent to evaluate and treat the patient's acute illness and chronic medical conditions as well as time spent reviewing labs, radiology, discussing medical plan with covering medical team was more than 55 minutes with >50% of time spent face to face with patient, discussing with patient/family as well as coordination of care

## 2024-02-13 NOTE — PROGRESS NOTE ADULT - SUBJECTIVE AND OBJECTIVE BOX
TEA ECHAVARRIA  58y Female    CHIEF COMPLAINT:    Patient is a 58y old  Female who presents with a chief complaint of Respiratory Distress (2024 15:14)    INTERVAL HPI/OVERNIGHT EVENTS:    Patient seen and examined. No acute events overnight. OVerall unchanged, remains critically ill on HFNC and levophed     ROS: All other systems are negative.    Vital Signs:    T(F): 97.9 (24 @ 04:00), Max: 99.4 (24 @ 12:00)  HR: 84 (24 @ 04:00) (57 - 84)  BP: 95/50 (24 @ 04:00) (91/46 - 111/57)  RR: 20 (24 @ 04:00) (18 - 20)  SpO2: 93% (24 @ 04:00) (93% - 100%)    2024 07:01  -  2024 07:00  --------------------------------------------------------  IN: 2411.2 mL / OUT: 1400 mL / NET: 1011.2 mL    Daily Weight in k (2024 00:00)    PHYSICAL EXAM:    GENERAL:  NAD chronically ill appearing   SKIN: No rashes or lesions  HEENT: Atraumatic. Normocephalic.   NECK: Supple, No JVD.   PULMONARY: decreased breath sounds B/L. No wheezing   CVS: Normal S1, S2. Rate and Rhythm are regular   ABDOMEN/GI: Soft, Nontender, Nondistended   MSK:  No clubbing or cyanosis   NEUROLOGIC: opens eyes spontaneously, does not follow commands  PSYCH: LEthargic, not alert or oriented    Consultant(s) Notes Reviewed:  [x ] YES  [ ] NO  Care Discussed with Consultants/Other Providers [ x] YES  [ ] NO    LABS:                        9.0    12.27 )-----------( 558      ( 2024 06:37 )             30.1     139  |  100  |  14  ----------------------------<  217<H>  5.0   |  29  |  0.5<L>    Ca    8.4      2024 06:37  Mg     2.5         TPro  6.1  /  Alb  2.7<L>  /  TBili  0.2  /  DBili  x   /  AST  18  /  ALT  15  /  AlkPhos  90      RADIOLOGY & ADDITIONAL TESTS:  Imaging or report Personally Reviewed:  [x] YES  [ ] NO  EKG reviewed: [x] YES  [ ] NO    Medications:  Standing  acetylcysteine 20%  Inhalation 4 milliLiter(s) Inhalation every 4 hours  albuterol/ipratropium for Nebulization 3 milliLiter(s) Nebulizer every 4 hours  artificial  tears Solution 1 Drop(s) Both EYES two times a day  calcium carbonate   1250 mG (OsCal) 1 Tablet(s) Oral two times a day  caspofungin IVPB      caspofungin IVPB 50 milliGRAM(s) IV Intermittent every 24 hours  chlorhexidine 2% Cloths 1 Application(s) Topical daily  enoxaparin Injectable 50 milliGRAM(s) SubCutaneous every 12 hours  gabapentin 300 milliGRAM(s) Oral every 12 hours  levETIRAcetam  Solution 500 milliGRAM(s) Oral every 12 hours  melatonin 3 milliGRAM(s) Oral at bedtime  meropenem  IVPB 1000 milliGRAM(s) IV Intermittent every 8 hours  midodrine. 20 milliGRAM(s) Oral three times a day  norepinephrine Infusion 0.05 MICROgram(s)/kG/Min IV Continuous <Continuous>  pantoprazole  Injectable 40 milliGRAM(s) IV Push every 24 hours  polyethylene glycol 3350 17 Gram(s) Oral at bedtime  raloxifene 60 milliGRAM(s) Oral daily  scopolamine 1 mG/72 Hr(s) Patch 1 Patch Transdermal every 72 hours  senna 2 Tablet(s) Oral at bedtime  silver sulfADIAZINE 1% Cream 1 Application(s) Topical two times a day  sodium chloride 0.65% Nasal 2 Spray(s) Both Nostrils three times a day  sodium chloride 3%  Inhalation 4 milliLiter(s) Inhalation every 4 hours    PRN Meds  acetaminophen     Tablet .. 650 milliGRAM(s) Oral every 6 hours PRN  aluminum hydroxide/magnesium hydroxide/simethicone Suspension 30 milliLiter(s) Oral every 4 hours PRN  ondansetron Injectable 4 milliGRAM(s) IV Push every 8 hours PRN             TEA ECHAVARRIA  58y Female    CHIEF COMPLAINT:    Patient is a 58y old  Female who presents with a chief complaint of Respiratory Distress (2024 15:14)    INTERVAL HPI/OVERNIGHT EVENTS:    Patient seen and examined. No acute events overnight. Overall unchanged, remains critically ill on HFNC and levophed 0.08  ROS: All other systems are negative.    Vital Signs:    T(F): 97.9 (24 @ 04:00), Max: 99.4 (24 @ 12:00)  HR: 84 (24 @ 04:00) (57 - 84)  BP: 95/50 (24 @ 04:00) (91/46 - 111/57)  RR: 20 (24 @ 04:00) (18 - 20)  SpO2: 93% (24 @ 04:00) (93% - 100%)    2024 07:01  -  2024 07:00  --------------------------------------------------------  IN: 2411.2 mL / OUT: 1400 mL / NET: 1011.2 mL    Daily Weight in k (2024 00:00)    PHYSICAL EXAM:    GENERAL:  NAD chronically ill appearing   SKIN: No rashes or lesions  HEENT: Atraumatic. Normocephalic.   NECK: Supple, No JVD.   PULMONARY: decreased breath sounds B/L. No wheezing   CVS: Normal S1, S2. Rate and Rhythm are regular   ABDOMEN/GI: Soft, Nontender, Nondistended   MSK:  No clubbing or cyanosis   NEUROLOGIC: opens eyes spontaneously, does not follow commands  PSYCH: Lethargic not alert or oriented    Consultant(s) Notes Reviewed:  [x ] YES  [ ] NO  Care Discussed with Consultants/Other Providers [ x] YES  [ ] NO    LABS:                        9.0    12.27 )-----------( 558      ( 2024 06:37 )             30.1     139  |  100  |  14  ----------------------------<  217<H>  5.0   |  29  |  0.5<L>    Ca    8.4      2024 06:37  Mg     2.5         TPro  6.1  /  Alb  2.7<L>  /  TBili  0.2  /  DBili  x   /  AST  18  /  ALT  15  /  AlkPhos  90      RADIOLOGY & ADDITIONAL TESTS:  Imaging or report Personally Reviewed:  [x] YES  [ ] NO  EKG reviewed: [x] YES  [ ] NO    Medications:  Standing  acetylcysteine 20%  Inhalation 4 milliLiter(s) Inhalation every 4 hours  albuterol/ipratropium for Nebulization 3 milliLiter(s) Nebulizer every 4 hours  artificial  tears Solution 1 Drop(s) Both EYES two times a day  calcium carbonate   1250 mG (OsCal) 1 Tablet(s) Oral two times a day  caspofungin IVPB      caspofungin IVPB 50 milliGRAM(s) IV Intermittent every 24 hours  chlorhexidine 2% Cloths 1 Application(s) Topical daily  enoxaparin Injectable 50 milliGRAM(s) SubCutaneous every 12 hours  gabapentin 300 milliGRAM(s) Oral every 12 hours  levETIRAcetam  Solution 500 milliGRAM(s) Oral every 12 hours  melatonin 3 milliGRAM(s) Oral at bedtime  meropenem  IVPB 1000 milliGRAM(s) IV Intermittent every 8 hours  midodrine. 20 milliGRAM(s) Oral three times a day  norepinephrine Infusion 0.05 MICROgram(s)/kG/Min IV Continuous <Continuous>  pantoprazole  Injectable 40 milliGRAM(s) IV Push every 24 hours  polyethylene glycol 3350 17 Gram(s) Oral at bedtime  raloxifene 60 milliGRAM(s) Oral daily  scopolamine 1 mG/72 Hr(s) Patch 1 Patch Transdermal every 72 hours  senna 2 Tablet(s) Oral at bedtime  silver sulfADIAZINE 1% Cream 1 Application(s) Topical two times a day  sodium chloride 0.65% Nasal 2 Spray(s) Both Nostrils three times a day  sodium chloride 3%  Inhalation 4 milliLiter(s) Inhalation every 4 hours    PRN Meds  acetaminophen     Tablet .. 650 milliGRAM(s) Oral every 6 hours PRN  aluminum hydroxide/magnesium hydroxide/simethicone Suspension 30 milliLiter(s) Oral every 4 hours PRN  ondansetron Injectable 4 milliGRAM(s) IV Push every 8 hours PRN

## 2024-02-14 LAB
ANION GAP SERPL CALC-SCNC: 9 MMOL/L — SIGNIFICANT CHANGE UP (ref 7–14)
BASOPHILS # BLD AUTO: 0.1 K/UL — SIGNIFICANT CHANGE UP (ref 0–0.2)
BASOPHILS NFR BLD AUTO: 0.9 % — SIGNIFICANT CHANGE UP (ref 0–1)
BUN SERPL-MCNC: 12 MG/DL — SIGNIFICANT CHANGE UP (ref 10–20)
CALCIUM SERPL-MCNC: 8.1 MG/DL — LOW (ref 8.4–10.4)
CHLORIDE SERPL-SCNC: 99 MMOL/L — SIGNIFICANT CHANGE UP (ref 98–110)
CO2 SERPL-SCNC: 29 MMOL/L — SIGNIFICANT CHANGE UP (ref 17–32)
CREAT SERPL-MCNC: 0.5 MG/DL — LOW (ref 0.7–1.5)
CULTURE RESULTS: SIGNIFICANT CHANGE UP
CULTURE RESULTS: SIGNIFICANT CHANGE UP
EGFR: 109 ML/MIN/1.73M2 — SIGNIFICANT CHANGE UP
EOSINOPHIL # BLD AUTO: 0.37 K/UL — SIGNIFICANT CHANGE UP (ref 0–0.7)
EOSINOPHIL NFR BLD AUTO: 3.3 % — SIGNIFICANT CHANGE UP (ref 0–8)
GLUCOSE SERPL-MCNC: 161 MG/DL — HIGH (ref 70–99)
HCT VFR BLD CALC: 27.1 % — LOW (ref 37–47)
HGB BLD-MCNC: 8.2 G/DL — LOW (ref 12–16)
IMM GRANULOCYTES NFR BLD AUTO: 0.4 % — HIGH (ref 0.1–0.3)
LYMPHOCYTES # BLD AUTO: 1.49 K/UL — SIGNIFICANT CHANGE UP (ref 1.2–3.4)
LYMPHOCYTES # BLD AUTO: 13.3 % — LOW (ref 20.5–51.1)
MCHC RBC-ENTMCNC: 24 PG — LOW (ref 27–31)
MCHC RBC-ENTMCNC: 30.3 G/DL — LOW (ref 32–37)
MCV RBC AUTO: 79.5 FL — LOW (ref 81–99)
MONOCYTES # BLD AUTO: 0.76 K/UL — HIGH (ref 0.1–0.6)
MONOCYTES NFR BLD AUTO: 6.8 % — SIGNIFICANT CHANGE UP (ref 1.7–9.3)
NEUTROPHILS # BLD AUTO: 8.46 K/UL — HIGH (ref 1.4–6.5)
NEUTROPHILS NFR BLD AUTO: 75.3 % — HIGH (ref 42.2–75.2)
NRBC # BLD: 0 /100 WBCS — SIGNIFICANT CHANGE UP (ref 0–0)
PLATELET # BLD AUTO: 705 K/UL — HIGH (ref 130–400)
PMV BLD: 10.9 FL — HIGH (ref 7.4–10.4)
POTASSIUM SERPL-MCNC: 4.8 MMOL/L — SIGNIFICANT CHANGE UP (ref 3.5–5)
POTASSIUM SERPL-SCNC: 4.8 MMOL/L — SIGNIFICANT CHANGE UP (ref 3.5–5)
RBC # BLD: 3.41 M/UL — LOW (ref 4.2–5.4)
RBC # FLD: 21.5 % — HIGH (ref 11.5–14.5)
SODIUM SERPL-SCNC: 137 MMOL/L — SIGNIFICANT CHANGE UP (ref 135–146)
SPECIMEN SOURCE: SIGNIFICANT CHANGE UP
SPECIMEN SOURCE: SIGNIFICANT CHANGE UP
WBC # BLD: 11.22 K/UL — HIGH (ref 4.8–10.8)
WBC # FLD AUTO: 11.22 K/UL — HIGH (ref 4.8–10.8)

## 2024-02-14 PROCEDURE — 99233 SBSQ HOSP IP/OBS HIGH 50: CPT

## 2024-02-14 PROCEDURE — 99291 CRITICAL CARE FIRST HOUR: CPT

## 2024-02-14 PROCEDURE — 71045 X-RAY EXAM CHEST 1 VIEW: CPT | Mod: 26,77

## 2024-02-14 PROCEDURE — 71045 X-RAY EXAM CHEST 1 VIEW: CPT | Mod: 26

## 2024-02-14 RX ORDER — SODIUM CHLORIDE 9 MG/ML
250 INJECTION, SOLUTION INTRAVENOUS ONCE
Refills: 0 | Status: COMPLETED | OUTPATIENT
Start: 2024-02-14 | End: 2024-02-14

## 2024-02-14 RX ORDER — NOREPINEPHRINE BITARTRATE/D5W 8 MG/250ML
0.05 PLASTIC BAG, INJECTION (ML) INTRAVENOUS
Qty: 8 | Refills: 0 | Status: DISCONTINUED | OUTPATIENT
Start: 2024-02-14 | End: 2024-02-15

## 2024-02-14 RX ORDER — SACCHAROMYCES BOULARDII 250 MG
250 POWDER IN PACKET (EA) ORAL
Refills: 0 | Status: DISCONTINUED | OUTPATIENT
Start: 2024-02-14 | End: 2024-04-08

## 2024-02-14 RX ADMIN — GABAPENTIN 300 MILLIGRAM(S): 400 CAPSULE ORAL at 17:12

## 2024-02-14 RX ADMIN — Medication 3 MILLILITER(S): at 19:53

## 2024-02-14 RX ADMIN — Medication 2 SPRAY(S): at 05:05

## 2024-02-14 RX ADMIN — MEROPENEM 100 MILLIGRAM(S): 1 INJECTION INTRAVENOUS at 14:02

## 2024-02-14 RX ADMIN — MEROPENEM 100 MILLIGRAM(S): 1 INJECTION INTRAVENOUS at 05:05

## 2024-02-14 RX ADMIN — Medication 4 MILLILITER(S): at 19:54

## 2024-02-14 RX ADMIN — Medication 1 APPLICATION(S): at 17:12

## 2024-02-14 RX ADMIN — SODIUM CHLORIDE 4 MILLILITER(S): 9 INJECTION INTRAMUSCULAR; INTRAVENOUS; SUBCUTANEOUS at 08:05

## 2024-02-14 RX ADMIN — GABAPENTIN 300 MILLIGRAM(S): 400 CAPSULE ORAL at 05:13

## 2024-02-14 RX ADMIN — SODIUM CHLORIDE 4 MILLILITER(S): 9 INJECTION INTRAMUSCULAR; INTRAVENOUS; SUBCUTANEOUS at 15:37

## 2024-02-14 RX ADMIN — LEVETIRACETAM 500 MILLIGRAM(S): 250 TABLET, FILM COATED ORAL at 05:12

## 2024-02-14 RX ADMIN — Medication 2 SPRAY(S): at 13:59

## 2024-02-14 RX ADMIN — Medication 1 TABLET(S): at 05:12

## 2024-02-14 RX ADMIN — SODIUM CHLORIDE 4 MILLILITER(S): 9 INJECTION INTRAMUSCULAR; INTRAVENOUS; SUBCUTANEOUS at 19:53

## 2024-02-14 RX ADMIN — MEROPENEM 100 MILLIGRAM(S): 1 INJECTION INTRAVENOUS at 21:48

## 2024-02-14 RX ADMIN — Medication 3 MILLIGRAM(S): at 21:48

## 2024-02-14 RX ADMIN — Medication 1 TABLET(S): at 17:13

## 2024-02-14 RX ADMIN — CHLORHEXIDINE GLUCONATE 1 APPLICATION(S): 213 SOLUTION TOPICAL at 11:06

## 2024-02-14 RX ADMIN — SODIUM CHLORIDE 250 MILLILITER(S): 9 INJECTION, SOLUTION INTRAVENOUS at 09:26

## 2024-02-14 RX ADMIN — Medication 4 MILLILITER(S): at 15:38

## 2024-02-14 RX ADMIN — Medication 3 MILLILITER(S): at 08:06

## 2024-02-14 RX ADMIN — Medication 250 MILLIGRAM(S): at 17:13

## 2024-02-14 RX ADMIN — LEVETIRACETAM 500 MILLIGRAM(S): 250 TABLET, FILM COATED ORAL at 17:12

## 2024-02-14 RX ADMIN — Medication 3 MILLILITER(S): at 15:36

## 2024-02-14 RX ADMIN — Medication 650 MILLIGRAM(S): at 16:55

## 2024-02-14 RX ADMIN — Medication 2 SPRAY(S): at 21:48

## 2024-02-14 RX ADMIN — Medication 1 APPLICATION(S): at 05:13

## 2024-02-14 RX ADMIN — Medication 4 MILLILITER(S): at 08:06

## 2024-02-14 RX ADMIN — ENOXAPARIN SODIUM 50 MILLIGRAM(S): 100 INJECTION SUBCUTANEOUS at 05:33

## 2024-02-14 RX ADMIN — RALOXIFENE HYDROCHLORIDE 60 MILLIGRAM(S): 60 TABLET, COATED ORAL at 11:07

## 2024-02-14 RX ADMIN — SODIUM CHLORIDE 4 MILLILITER(S): 9 INJECTION INTRAMUSCULAR; INTRAVENOUS; SUBCUTANEOUS at 12:21

## 2024-02-14 RX ADMIN — ENOXAPARIN SODIUM 50 MILLIGRAM(S): 100 INJECTION SUBCUTANEOUS at 17:14

## 2024-02-14 RX ADMIN — PANTOPRAZOLE SODIUM 40 MILLIGRAM(S): 20 TABLET, DELAYED RELEASE ORAL at 05:13

## 2024-02-14 RX ADMIN — Medication 4 MILLILITER(S): at 12:00

## 2024-02-14 RX ADMIN — MIDODRINE HYDROCHLORIDE 20 MILLIGRAM(S): 2.5 TABLET ORAL at 11:11

## 2024-02-14 RX ADMIN — MIDODRINE HYDROCHLORIDE 20 MILLIGRAM(S): 2.5 TABLET ORAL at 05:13

## 2024-02-14 RX ADMIN — Medication 4.9 MICROGRAM(S)/KG/MIN: at 16:40

## 2024-02-14 RX ADMIN — MIDODRINE HYDROCHLORIDE 20 MILLIGRAM(S): 2.5 TABLET ORAL at 16:58

## 2024-02-14 RX ADMIN — Medication 1 DROP(S): at 17:11

## 2024-02-14 RX ADMIN — Medication 1 DROP(S): at 05:14

## 2024-02-14 RX ADMIN — Medication 3 MILLILITER(S): at 12:21

## 2024-02-14 RX ADMIN — CASPOFUNGIN ACETATE 260 MILLIGRAM(S): 7 INJECTION, POWDER, LYOPHILIZED, FOR SOLUTION INTRAVENOUS at 09:25

## 2024-02-14 NOTE — PROGRESS NOTE ADULT - ASSESSMENT
58F w/ PMHx Down Syndrome, nonverbal at baseline, Hypothyroidism, Cerebral palsy and Seizure Disorders [facility] to ED for respiratory distress and high grade fever. Patient found to be septic on admission.Admitted to SDU for management of acute respiratory distress 2/2 CAP/Aspiration PNA (20 Jan 2024 18:22)  While pt was on the floor she developed dyspnea after swallow evaluation, was spiking high grade fever, consulted by pulmonary/critical care and was upgraded back to SDU.    A/P  # Sepsis POA / Acute hypoxic resp failure / RSV bronchiolitis /  H/o Dysphagia/ suspected aspiration PNA   - CXR white out L lung, improving,  likely resolving mucous plug  - ID is following, recommendations noted:  - Continue meropenem- end 2/15 to complete 14 days , monitor closely as continued significant opacities on CXR and recent fevers  - Fungitell started downtrending 1 day after Caspo started  - Continue caspofungin 50mg q24h IV , will likely need empiric 14 day course. 2/18  - pt has no risk factors for PCP or other invasive fungal infection   - CT Chest, AP when stable   - MRSA negative   - BCx (1/20): Staph hominis -  contaminant repeat BCx have been NGTD  - Failed FEES, put  NG tube for feedings and medications, pt'll likely need PEG, consulted by GI   - Aspiration precautions, order chest PT vest   - d/c  Scopolamine patch  - supplement oxygen, monitor pulse Ox   - c/w duoneb  - pulmonary is following, recommendations noted:   - Wean FiO2 Keep spo2 more than 92%.  Daily CXR. Aggressive pulm toilet, frequent suctioning.  Might need MV.    # Hypotension  - c/w   Midodrine  20 mg Q 8 hours   - taper  off pressure support  ast tolerated   - keep MAP above 60     # Elevated Troponin , type 2 MI/  Sinus tachycardia  - c/w telemetry monitoring   - Repeat EKG: Normal sinus rhythm  - c/w  metoprolol  -  maintain fluid balance     # Seizure Disorder  - c/w  Keppra   - seizure precautions  - keep Mg above 2.0     # H/o lower ext DVT  - therapeutic Lovenox recommended by ICU team  - Eliquis held     # Hypothyroidism  - Thyroid Stimulating Hormone, Serum: 0.51 uIU/mL (01.21.24 @ 06:34)  - check FT4     # Normocytic Anemia   - monitor H/H, keep Hb above 7.5     # Down Syndrome/  Cerebral Palsy  - supportive care  - prevent falls and aspiration   - failed speech and swallow, needs PEG   - on Raloxifene     # Full code    # GI prophylaxis       Pending (specify):  pulmonary toilet, chest PT vest,  frequent suction,  d/c Scopolamine patch, c/w  Caspofungin and Meropenem,  keep MAP above 60, taper off pressure support, c/w  Therapeutic Lovenox , monitor WBC and fever curve,  supportive care, pt needs PEG, daily CXR   overall prognosis is very poor

## 2024-02-14 NOTE — PROGRESS NOTE ADULT - ASSESSMENT
IMPRESSION:    Acute hypoxemic respiratory failure  Likely aspiration pneumonia   Sepsis POA  Septic shock  on Levophed   Recurrent aspiration pneumonia / prior intubation  HO DVT on Eliquis   HO GI bleed  HO OM  HO recent duodenal perforation   HO polymicrobial bacteremia   H/o CP, DS  H/o seizures    PLAN:    CNS:  Avoid CNS Depressant, AED. MS at baseline.     HEENT: Oral care.     PULMONARY: HOB at 45 degrees.  Aspiration precaution. Wean FiO2 Keep spo2 more than 92%.  Daily CXR. Aggressive pulm toilet, frequent suctioning.  Might need MV. Chest vest    CARDIOVASCULAR: Keep MAP more than 60. Avoid overload. Wean levophed as able. C/w midodrine 20mg.  Goal directed fluid resuscitation.    GI: Protonix. NG  feeding. Speech and swallow recs noted. Might need PEG when more stable    INFECTIOUS DISEASE:   c/w Meropenem.  Finish empiric Caspo course 2/18. F/u blood Cx, serum galactomannan. Histo Ag negative,    HEMATOLOGICAL:  Lovenox therapeutic.  Monitor CBC     ENDOCRINE:  Follow up FS.  Insulin protocol if needed.    MUSCULOSKELETAL: Bedrest.  Off loading.  Wound care.      Prognosis very poor.    Palliative care following    SDU. IMPRESSION:    Acute hypoxemic respiratory failure  Likely aspiration pneumonia   Sepsis POA  Septic shock  on Levophed   Recurrent aspiration pneumonia / prior intubation  HO DVT on Eliquis   HO GI bleed  HO OM  HO recent duodenal perforation   HO polymicrobial bacteremia   H/o CP, DS  H/o seizures    PLAN:    CNS:  Avoid CNS Depressant, AED. MS at baseline.     HEENT: Oral care.     PULMONARY: HOB at 45 degrees.  Aspiration precaution. Wean FiO2 Keep spo2 more than 92%.  Daily CXR. Aggressive pulm toilet, frequent suctioning.  Might need MV.    CARDIOVASCULAR: Keep MAP more than 60. Avoid overload. Wean levophed as able. C/w midodrine 20mg.  Goal directed fluid resuscitation.    GI: Protonix. NG  feeding. Speech and swallow recs noted. Might need PEG when more stable    INFECTIOUS DISEASE:   c/w Meropenem.  Finish empiric Caspo course 2/18. F/u blood Cx, serum galactomannan. Histo Ag negative,    HEMATOLOGICAL:  Lovenox therapeutic.  Monitor CBC     ENDOCRINE:  Follow up FS.  Insulin protocol if needed.    MUSCULOSKELETAL: Bedrest.  Off loading.  Wound care.      Prognosis very poor.    Palliative care following    SDU.

## 2024-02-14 NOTE — PROGRESS NOTE ADULT - SUBJECTIVE AND OBJECTIVE BOX
Patient is a 58y old  Female who presents with a chief complaint of Respiratory Distress (13 Feb 2024 16:20)        Over Night Events: Pt is on HFNC 60/90, still on pressors, unable to wean         ROS:     All ROS are negative except HPI         PHYSICAL EXAM    ICU Vital Signs Last 24 Hrs  T(C): 36.4 (14 Feb 2024 07:40), Max: 37.2 (13 Feb 2024 12:00)  T(F): 97.5 (14 Feb 2024 07:40), Max: 99 (13 Feb 2024 12:00)  HR: 69 (14 Feb 2024 07:40) (63 - 109)  BP: 128/53 (14 Feb 2024 07:40) (84/49 - 132/62)  BP(mean): 77 (14 Feb 2024 07:40) (63 - 77)  ABP: --  ABP(mean): --  RR: 18 (14 Feb 2024 07:40) (18 - 20)  SpO2: 91% (14 Feb 2024 07:40) (91% - 99%)    O2 Parameters below as of 14 Feb 2024 07:40  Patient On (Oxygen Delivery Method): nasal cannula, high flow            CONSTITUTIONAL:  Ill appearing     CARDIAC:   Normal rate,   Regular rhythm.    No edema    RESPIRATORY:   BL crackles    GASTROINTESTINAL:  Abdomen soft,   Non-tender,   No guarding,     MUSCULOSKELETAL:   Range of motion is not limited,  No clubbing, cyanosis    NEUROLOGICAL:   does not follow commands    SKIN:   Sacral friction wound     02-13-24 @ 07:01  -  02-14-24 @ 07:00  --------------------------------------------------------  IN:    Enteral Tube Flush: 250 mL    Enteral Tube Flush: 300 mL    Jevity 1.2: 600 mL    Norepinephrine: 78.2 mL  Total IN: 1228.2 mL    OUT:    Voided (mL): 1600 mL  Total OUT: 1600 mL    Total NET: -371.8 mL          LABS:                            8.2    11.22 )-----------( 705      ( 14 Feb 2024 07:26 )             27.1                                               02-13    140  |  98  |  15  ----------------------------<  121<H>  4.8   |  30  |  0.5<L>    Ca    8.3<L>      13 Feb 2024 06:26  Mg     2.5     02-13                                             Urinalysis Basic - ( 13 Feb 2024 06:26 )    Color: x / Appearance: x / SG: x / pH: x  Gluc: 121 mg/dL / Ketone: x  / Bili: x / Urobili: x   Blood: x / Protein: x / Nitrite: x   Leuk Esterase: x / RBC: x / WBC x   Sq Epi: x / Non Sq Epi: x / Bacteria: x                                                                                       Culture - Blood (collected 12 Feb 2024 11:17)  Source: .Blood None  Preliminary Report (13 Feb 2024 21:00):    No growth at 24 hours                                                                                           MEDICATIONS  (STANDING):  acetylcysteine 20%  Inhalation 4 milliLiter(s) Inhalation every 4 hours  albuterol/ipratropium for Nebulization 3 milliLiter(s) Nebulizer every 4 hours  artificial  tears Solution 1 Drop(s) Both EYES two times a day  calcium carbonate   1250 mG (OsCal) 1 Tablet(s) Oral two times a day  caspofungin IVPB 50 milliGRAM(s) IV Intermittent every 24 hours  caspofungin IVPB      chlorhexidine 2% Cloths 1 Application(s) Topical daily  enoxaparin Injectable 50 milliGRAM(s) SubCutaneous every 12 hours  gabapentin 300 milliGRAM(s) Oral every 12 hours  levETIRAcetam  Solution 500 milliGRAM(s) Oral every 12 hours  melatonin 3 milliGRAM(s) Oral at bedtime  meropenem  IVPB 1000 milliGRAM(s) IV Intermittent every 8 hours  midodrine. 20 milliGRAM(s) Oral three times a day  norepinephrine Infusion 0.05 MICROgram(s)/kG/Min (4.9 mL/Hr) IV Continuous <Continuous>  pantoprazole  Injectable 40 milliGRAM(s) IV Push every 24 hours  polyethylene glycol 3350 17 Gram(s) Oral at bedtime  raloxifene 60 milliGRAM(s) Oral daily  scopolamine 1 mG/72 Hr(s) Patch 1 Patch Transdermal every 72 hours  senna 2 Tablet(s) Oral at bedtime  silver sulfADIAZINE 1% Cream 1 Application(s) Topical two times a day  sodium chloride 0.65% Nasal 2 Spray(s) Both Nostrils three times a day  sodium chloride 3%  Inhalation 4 milliLiter(s) Inhalation every 4 hours    MEDICATIONS  (PRN):  acetaminophen     Tablet .. 650 milliGRAM(s) Oral every 6 hours PRN Temp greater or equal to 38C (100.4F), Mild Pain (1 - 3)  aluminum hydroxide/magnesium hydroxide/simethicone Suspension 30 milliLiter(s) Oral every 4 hours PRN Dyspepsia  ondansetron Injectable 4 milliGRAM(s) IV Push every 8 hours PRN Nausea and/or Vomiting      New X-rays reviewed:                                                                                  ECHO     Patient is a 58y old  Female who presents with a chief complaint of Respiratory Distress (13 Feb 2024 16:20)        Over Night Events: Remains critically ill on HFNC 60/90, still on pressors        ROS:     All ROS are negative except HPI         PHYSICAL EXAM    ICU Vital Signs Last 24 Hrs  T(C): 36.4 (14 Feb 2024 07:40), Max: 37.2 (13 Feb 2024 12:00)  T(F): 97.5 (14 Feb 2024 07:40), Max: 99 (13 Feb 2024 12:00)  HR: 69 (14 Feb 2024 07:40) (63 - 109)  BP: 128/53 (14 Feb 2024 07:40) (84/49 - 132/62)  BP(mean): 77 (14 Feb 2024 07:40) (63 - 77)  ABP: --  ABP(mean): --  RR: 18 (14 Feb 2024 07:40) (18 - 20)  SpO2: 91% (14 Feb 2024 07:40) (91% - 99%)    O2 Parameters below as of 14 Feb 2024 07:40  Patient On (Oxygen Delivery Method): nasal cannula, high flow            CONSTITUTIONAL:  Ill appearing     CARDIAC:   Normal rate,   Regular rhythm.    No edema    RESPIRATORY:   BL crackles    GASTROINTESTINAL:  Abdomen soft,   Non-tender,   No guarding,     MUSCULOSKELETAL:   Range of motion is not limited,  No clubbing, cyanosis    NEUROLOGICAL:   does not follow commands    SKIN:   Sacral friction wound     02-13-24 @ 07:01  -  02-14-24 @ 07:00  --------------------------------------------------------  IN:    Enteral Tube Flush: 250 mL    Enteral Tube Flush: 300 mL    Jevity 1.2: 600 mL    Norepinephrine: 78.2 mL  Total IN: 1228.2 mL    OUT:    Voided (mL): 1600 mL  Total OUT: 1600 mL    Total NET: -371.8 mL          LABS:                            8.2    11.22 )-----------( 705      ( 14 Feb 2024 07:26 )             27.1                                               02-13    140  |  98  |  15  ----------------------------<  121<H>  4.8   |  30  |  0.5<L>    Ca    8.3<L>      13 Feb 2024 06:26  Mg     2.5     02-13                                             Urinalysis Basic - ( 13 Feb 2024 06:26 )    Color: x / Appearance: x / SG: x / pH: x  Gluc: 121 mg/dL / Ketone: x  / Bili: x / Urobili: x   Blood: x / Protein: x / Nitrite: x   Leuk Esterase: x / RBC: x / WBC x   Sq Epi: x / Non Sq Epi: x / Bacteria: x                                                                                       Culture - Blood (collected 12 Feb 2024 11:17)  Source: .Blood None  Preliminary Report (13 Feb 2024 21:00):    No growth at 24 hours                                                                                           MEDICATIONS  (STANDING):  acetylcysteine 20%  Inhalation 4 milliLiter(s) Inhalation every 4 hours  albuterol/ipratropium for Nebulization 3 milliLiter(s) Nebulizer every 4 hours  artificial  tears Solution 1 Drop(s) Both EYES two times a day  calcium carbonate   1250 mG (OsCal) 1 Tablet(s) Oral two times a day  caspofungin IVPB 50 milliGRAM(s) IV Intermittent every 24 hours  caspofungin IVPB      chlorhexidine 2% Cloths 1 Application(s) Topical daily  enoxaparin Injectable 50 milliGRAM(s) SubCutaneous every 12 hours  gabapentin 300 milliGRAM(s) Oral every 12 hours  levETIRAcetam  Solution 500 milliGRAM(s) Oral every 12 hours  melatonin 3 milliGRAM(s) Oral at bedtime  meropenem  IVPB 1000 milliGRAM(s) IV Intermittent every 8 hours  midodrine. 20 milliGRAM(s) Oral three times a day  norepinephrine Infusion 0.05 MICROgram(s)/kG/Min (4.9 mL/Hr) IV Continuous <Continuous>  pantoprazole  Injectable 40 milliGRAM(s) IV Push every 24 hours  polyethylene glycol 3350 17 Gram(s) Oral at bedtime  raloxifene 60 milliGRAM(s) Oral daily  scopolamine 1 mG/72 Hr(s) Patch 1 Patch Transdermal every 72 hours  senna 2 Tablet(s) Oral at bedtime  silver sulfADIAZINE 1% Cream 1 Application(s) Topical two times a day  sodium chloride 0.65% Nasal 2 Spray(s) Both Nostrils three times a day  sodium chloride 3%  Inhalation 4 milliLiter(s) Inhalation every 4 hours    MEDICATIONS  (PRN):  acetaminophen     Tablet .. 650 milliGRAM(s) Oral every 6 hours PRN Temp greater or equal to 38C (100.4F), Mild Pain (1 - 3)  aluminum hydroxide/magnesium hydroxide/simethicone Suspension 30 milliLiter(s) Oral every 4 hours PRN Dyspepsia  ondansetron Injectable 4 milliGRAM(s) IV Push every 8 hours PRN Nausea and/or Vomiting      New X-rays reviewed:                                                                                  ECHO

## 2024-02-14 NOTE — PROGRESS NOTE ADULT - ASSESSMENT
58F with Down syndrome, nonverbal at baseline, hypothyroidism, CP, and seizure disorder here from [facility] with fever and respiratory distress. Found to be septic on admission, from CAP/aspiration PNA, on IV vanco and meropenem, with course c/b continued fevers, and bacteremia with S. hominis. Also failed FEES, has NGT in place and will likely need PEG. Is requiring HFNC alternating with BiPAP. Is also on midodrine for hypotension. Patient is full code. Of note patient is under [place] [facility]. Palliative care consulted for [facility].

## 2024-02-14 NOTE — PROGRESS NOTE ADULT - ASSESSMENT
58F w/ PMHx Down Syndrome, nonverbal at baseline, Hypothyroidism, Cerebral palsy and Seizure Disorders presents to the ED from nursing [facility] ([facility] presented to ED for respiratory distress and high grade fever. Patient found to be septic on admission.Admitted to SDU for management of acute respiratory distress 2/2 CAP/Aspiration PNA (20 Jan 2024 18:22)  While pt was on the floor she developed dyspnea after swallow evaluation, was spiking high grade fever, consulted by pulmonary/critical care and was upgraded back to SDU.    #Sepsis POA (Febrile, Tachycardic, Leukocytosis)  #Acute Hypoxic Respiratory Failure secondary to Aspiration PNA / RSV  #Hx of Dysphagia - Puree Diet at baseline  #Left lung almost complete collapse on 02.12.2024- improving  - On admission: VBG Lactate 2.1 improved to wnl, procal 0.04 pH wnl and CO2 mildly elevated 61  - inflammatory markers elevated, RVP is RSV +ve  - MRSA swab neg, urine strep neg, urine legionella neg  - D-dimer 605 and LE duplex neg  - BCx (1/20): Staph hominis -  contaminant repeat BCx have been NGTD  - UCx neg  - Failed FEES, s/p  NG tube for feedings and medications, patient will need PEG tube once improved. Recall GI once off HFNC.  - Aspiration precautions, Chest PT vest , c/w nebs  - ID is following, recommendations noted:   - Repeat fungitell 63, indeterminate (Fungitell chronically positive, received Caspofungin on previous admission)  - c/w  Caspo 50mg q24h IV till 02.18  - c/w meropenem 1g q8h IV  - repeat procal 0.1 (02.12.2024 trending down)  - Removed RUE PIV, phlebitis on 02.12.2024  - DC Vanco per ID as MRSA is negative  -  histoplasma Ab serum negative   - needs CT A/P once more stable   - on HFNC/NRB Keep spo2 more than 92%.   - remains on levophed 0.09 and midodrine 20 Q8H. Taper Levophed to target MAP 60-65mmHg.    #Hypernatremia - improved, cw free water to 250 Q4H   #H/o hypotension  - Midodrine dose increased to 20 mg Q 8 hours    - keep MAP above 60, taper off levophed as able     #Elevated Troponin (Stabilized)  #Sinus Tachycardia (Controlled)  - Elevated Trops and Tachycardia (sepsis)    - Troponins: 25>37>35>35  - c/w telemetry monitoring   - Repeat EKG: Normal sinus rhythm  - d/c Lopressor 12.5mg BID    #Hx of Right Foot OM (1st Toe Stump and 2nd Distal Phalanx)  #Hx of Multiple Left Foot DTIs  #Hx of Sacral Wound  - Previous wound cultures positive for Proteus and ESBL E coli  - Patient underwent excisional debridement of ulcer of right foot (including 1st metatarsal) and partial amputation 2nd toe on previous admissions  - No acute surgical interventions from podiatry and burn  - C/w q2hr repositioning  - C/w local wound care  - Podiatry recalled 2/5 for foot wounds, updated recs placed    #Seizure Disorder  - c/w  Keppra 500 BID  - seizure precautions  - keep Mg above 2.0     #Hx of DVT of L Common Femoral Vein  - LE Duplex negative for DVT  - C/w Lovenox 50mg q12hr, HOLDING ELIQUIS    #Acute on Chronic Iron Deficiency Anemia (Stable)  - On admission: HgB 8.9 (previously 9.5)  - No evidence of hemorrhage  - B12 and Folate WNL normal  - Total Iron 15, Iron Saturation 6%, Ferritin 128 reflecting potential TIFFANI  - Hold off on iron supplementation in setting of infection  - Keep HgB >7    #Down Syndrome/  Cerebral Palsy  - supportive care  - prevent falls and aspiration   - failed speech and swallow, needs PEG as above  - C/w Gabapentin 300mg BID  - C/w Raloxifene 60mg QD  - met with [facility] on 02.14.2024: HFNC isn't sustainable on long term basis; Per [facility] if patient doesn't have terminal diagnosis (which isn't the case) they will opt for trach and long term Vent if needed. Consent form will be signed and faxed. Patient will remain full code till time of trach and vent.     Full code, overall prognosis is very poor, continue monitoring in SDU  PENDING: CXR in am, CT chest AP once stable, wean Levophed, PEG consent pending advisory board, follow blood Cx   58F w/ PMHx Down Syndrome, nonverbal at baseline, Hypothyroidism, Cerebral palsy and Seizure Disorders presents to the ED from nursing facility/group home ([facility]) presented to ED for respiratory distress and high grade fever. Patient found to be septic on admission.Admitted to SDU for management of acute respiratory distress 2/2 CAP/Aspiration PNA (20 Jan 2024 18:22)  While pt was on the floor she developed dyspnea after swallow evaluation, was spiking high grade fever, consulted by pulmonary/critical care and was upgraded back to SDU.    #Sepsis POA (Febrile, Tachycardic, Leukocytosis)  #Acute Hypoxic Respiratory Failure secondary to Aspiration PNA / RSV  #Hx of Dysphagia - Puree Diet at baseline  #Left lung almost complete collapse on 02.12.2024- improving  - On admission: VBG Lactate 2.1 improved to wnl, procal 0.04 pH wnl and CO2 mildly elevated 61  - inflammatory markers elevated, RVP is RSV +ve  - MRSA swab neg, urine strep neg, urine legionella neg  - D-dimer 605 and LE duplex neg  - BCx (1/20): Staph hominis -  contaminant repeat BCx have been NGTD  - UCx neg  - Failed FEES, s/p  NG tube for feedings and medications, patient will need PEG tube once improved. Recall GI once off HFNC.  - Aspiration precautions, Chest PT vest , c/w nebs  - ID is following, recommendations noted:   - Repeat fungitell 63, indeterminate (Fungitell chronically positive, received Caspofungin on previous admission)  - c/w  Caspo 50mg q24h IV till 02.18  - c/w meropenem 1g q8h IV till 02.15. May need to be restarted based on clinical course. Close follow up.  - repeat procal 0.1 (02.12.2024 trending down)  - Removed RUE PIV, phlebitis on 02.12.2024  - DC Vanco per ID as MRSA is negative  -  histoplasma Ab serum negative   - needs CT A/P once more stable   - on HFNC/NRB Keep spo2 more than 92%.   - remains on levophed 0.09 and midodrine 20 Q8H. Taper Levophed to target MAP 60-65mmHg.    #Hypernatremia - improved, cw free water to 250 Q4H   #H/o hypotension  - Midodrine dose increased to 20 mg Q 8 hours    - keep MAP above 60, taper off levophed as able     #Elevated Troponin (Stabilized)  #Sinus Tachycardia (Controlled)  - Elevated Trops and Tachycardia (sepsis)    - Troponins: 25>37>35>35  - c/w telemetry monitoring   - Repeat EKG: Normal sinus rhythm  - d/c Lopressor 12.5mg BID    #Hx of Right Foot OM (1st Toe Stump and 2nd Distal Phalanx)  #Hx of Multiple Left Foot DTIs  #Hx of Sacral Wound  - Previous wound cultures positive for Proteus and ESBL E coli  - Patient underwent excisional debridement of ulcer of right foot (including 1st metatarsal) and partial amputation 2nd toe on previous admissions  - No acute surgical interventions from podiatry and burn  - C/w q2hr repositioning  - C/w local wound care  - Podiatry recalled 2/5 for foot wounds, updated recs placed    #Seizure Disorder  - c/w  Keppra 500 BID  - seizure precautions  - keep Mg above 2.0     #Hx of DVT of L Common Femoral Vein  - LE Duplex negative for DVT  - C/w Lovenox 50mg q12hr, HOLDING ELIQUIS    #Acute on Chronic Iron Deficiency Anemia (Stable)  - On admission: HgB 8.9 (previously 9.5)  - No evidence of hemorrhage  - B12 and Folate WNL normal  - Total Iron 15, Iron Saturation 6%, Ferritin 128 reflecting potential TIFFANI  - Hold off on iron supplementation in setting of infection  - Keep HgB >7    #Down Syndrome/  Cerebral Palsy  - supportive care  - prevent falls and aspiration   - failed speech and swallow, needs PEG as above  - C/w Gabapentin 300mg BID  - C/w Raloxifene 60mg QD  - met with [facility] on 02.14.2024: HFNC isn't sustainable on long term basis; Per [facility] if patient doesn't have terminal diagnosis (which isn't the case) they will opt for trach and long term Vent if needed. Patient will remain full code.      Full code, overall prognosis is very poor, continue monitoring in SDU  PENDING: CXR in am, CT chest AP once stable, wean Levophed, PEG consent pending advisory board, follow blood Cx   58F w/ PMHx Down Syndrome, nonverbal at baseline, Hypothyroidism, Cerebral palsy and Seizure Disorders presents to the ED from nursing facility/group home ([facility]) presented to ED for respiratory distress and high grade fever. Patient found to be septic on admission.Admitted to SDU for management of acute respiratory distress 2/2 CAP/Aspiration PNA (20 Jan 2024 18:22)  While pt was on the floor she developed dyspnea after swallow evaluation, was spiking high grade fever, consulted by pulmonary/critical care and was upgraded back to SDU.    #Sepsis POA (Febrile, Tachycardic, Leukocytosis)  #Acute Hypoxic Respiratory Failure secondary to Aspiration PNA / RSV  #Hx of Dysphagia - Puree Diet at baseline  #Left lung almost complete collapse on 02.12.2024- improving  - On admission: VBG Lactate 2.1 improved to wnl, procal 0.04 pH wnl and CO2 mildly elevated 61  - inflammatory markers elevated, RVP is RSV +ve  - MRSA swab neg, urine strep neg, urine legionella neg  - D-dimer 605 and LE duplex neg  - BCx (1/20): Staph hominis -  contaminant repeat BCx have been NGTD  - UCx neg  - Failed FEES, s/p  NG tube for feedings and medications, patient will need PEG tube once improved. Recall GI once off HFNC.  - Aspiration precautions, Chest PT vest , c/w nebs  - ID is following, recommendations noted:   - Repeat fungitell 63, indeterminate (Fungitell chronically positive, received Caspofungin on previous admission)  - c/w  Caspo 50mg q24h IV till 02.18  - c/w meropenem 1g q8h IV till 02.15. May need to be restarted based on clinical course. Close follow up.  - repeat procal 0.1 (02.12.2024 trending down)  - Removed RUE PIV, phlebitis on 02.12.2024  - DC Vanco per ID as MRSA is negative  -  histoplasma Ab serum negative   - needs CT A/P once more stable   - on HFNC/NRB Keep spo2 more than 92%.   - remains on levophed 0.09 and midodrine 20 Q8H. Taper Levophed to target MAP 60-65mmHg.    #Hypernatremia - improved, cw free water to 250 Q4H   #H/o hypotension  - Midodrine dose increased to 20 mg Q 8 hours    - keep MAP above 60, taper off levophed as able     #Elevated Troponin (Stabilized)  #Sinus Tachycardia (Controlled)  - Elevated Trops and Tachycardia (sepsis)    - Troponins: 25>37>35>35  - c/w telemetry monitoring   - Repeat EKG: Normal sinus rhythm  - d/c Lopressor 12.5mg BID    #Hx of Right Foot OM (1st Toe Stump and 2nd Distal Phalanx)  #Hx of Multiple Left Foot DTIs  #Hx of Sacral Wound  - Previous wound cultures positive for Proteus and ESBL E coli  - Patient underwent excisional debridement of ulcer of right foot (including 1st metatarsal) and partial amputation 2nd toe on previous admissions  - No acute surgical interventions from podiatry and burn  - C/w q2hr repositioning  - C/w local wound care  - Podiatry recalled 2/5 for foot wounds, updated recs placed    #Seizure Disorder  - c/w  Keppra 500 BID  - seizure precautions  - keep Mg above 2.0     #Hx of DVT of L Common Femoral Vein  - LE Duplex negative for DVT  - C/w Lovenox 50mg q12hr, HOLDING ELIQUIS    #Acute on Chronic Iron Deficiency Anemia (Stable)  - On admission: HgB 8.9 (previously 9.5)  - No evidence of hemorrhage  - B12 and Folate WNL normal  - Total Iron 15, Iron Saturation 6%, Ferritin 128 reflecting potential TIFFANI  - Hold off on iron supplementation in setting of infection  - Keep HgB >7    #Down Syndrome/  Cerebral Palsy  - supportive care  - prevent falls and aspiration   - failed speech and swallow, needs PEG as above  - C/w Gabapentin 300mg BID  - C/w Raloxifene 60mg QD  - met with [facility] on 02.14.2024: HFNC isn't sustainable on long term basis; Per [facility] if patient doesn't have terminal diagnosis (which isn't the case) they will opt for trach and long term Vent if needed. Patient will remain full code. No tracheostomy is indicated at this moment per pulm. Will update [facility] if status changes.       Full code, overall prognosis is very poor, continue monitoring in SDU  PENDING: CXR in am, CT chest AP once stable, wean Levophed, PEG consent pending advisory board, follow blood Cx

## 2024-02-14 NOTE — PROGRESS NOTE ADULT - ASSESSMENT
ASSESSMENT  58F w/ PMHx Down Syndrome, nonverbal at baseline, Hypothyroidism, Cerebral palsy and Seizure Disorders presents to the ED from nursing facility/group home presented to ED for respiratory distress and high grade fever.     IMPRESSION  #Fevers, resolving   Possible RUE phlebitis   Unclear source , fever curve and WBC downtrending with caspofungin, unclear source    2/12 BCX NGTD    Procalcitonin, Serum: 0.10 (02-12-24 @ 11:17)    Rapid RVP Result: NotDetec (02-12-24 @ 10:28)    MRSA PCR Result.: Negative (02-12-24 @ 10:28)    2/9 BCX NGTD x2    2/3 BCX NGTD     2/1 BCX NGTD     2/1 UCX   >=3 organisms. Probable collection contamination.    1/31 BCX NG    Rapid RVP Result: NotDetec (02-04-24 @ 10:28)    MRSA PCR Result.: Negative (02-03-24 @ 21:32)     UA without significant pyuria   Procalcitonin, Serum: 0.22 (02-01-24 @ 16:00)--> Procalcitonin, Serum: 0.15 (02-03-24 @ 11:13), downtrending ; unremarkable   MRSA PCR Result.: Negative (01-22-24 @ 05:30)  < from: Xray Chest 1 View-PORTABLE IMMEDIATE (Xray Chest 1 View-PORTABLE IMMEDIATE .) (02.01.24 @ 03:02) >  Bibasal opacities without significant change.   #Acute hypoxic respiratory failure- Gram Negative pneumonia   #1/20 BCX 1/4 bottles : Staphylococcus hominis- contaminant   Repeat CX NG   #Severe Sepsis on admission  #RSV + 1/21  #Elevated fungitell   Fungitell: 63 (02-06-24 @ 11:27)  Fungitell: 325 (01-20-24 @ 21:23)  Fungitell: 138 (11-07-23 @ 08:08)  Fungitell: 115 (11-02-23 @ 11:40)  Fungitell: >500 pg/mL (10.17.23 @ 17:20) s/p empiric caspo   #Full thickness ulcer right heel- Appreciate burn/podiatry evaluation.  #History of Right planter foot ulcers - two full thickness ulcers - serous drainage with mild erythema with OM  - MR Foot No Cont, Right (10.16.23 @ 21:51): IMPRESSION: 1.  Limited exam. 2.  Osteomyelitis of the first metatarsal stump. 3.  Osteomyelitis of the second toe distal phalanx.  - s/p excidional debridement to and including bone 1st metatarsal with partial 2nd digit amputation - 1st metatarsal head resected - Wound Cx Proteus ESBL   #CKD 2-3 Creatinine: 0.7 mg/dL (02.02.24 @ 04:59)      #History of buttock ulcer  #Down syndrome/Cerebral Palsy     RECOMMENDATIONS  - CXR white out L lung, improving   - Continue meropenem   - Fungitell started downtrending 1 day after Caspo started, doubt related, but at this point given critical illness would plan on completing a course  - Continue caspofungin 50mg q24h IV , hx unclear elevated fungitell. No clear source, will likely need empiric 14 day course. 2/18. No risk factors for PCP or other invasive fungal infection   - CT Chest, AP when stable   - Grave prognosis, GOC     If any questions, please send a message or call on [employer] Teams  Please continue to update ID with any pertinent new laboratory or radiographic findings.       ASSESSMENT  58F w/ PMHx Down Syndrome, nonverbal at baseline, Hypothyroidism, Cerebral palsy and Seizure Disorders presents to the ED from nursing facility/group home presented to ED for respiratory distress and high grade fever.     IMPRESSION  #Fevers, resolving   Possible RUE phlebitis   Unclear source , fever curve and WBC downtrending with caspofungin, unclear source    2/12 BCX NGTD    Procalcitonin, Serum: 0.10 (02-12-24 @ 11:17)    Rapid RVP Result: NotDetec (02-12-24 @ 10:28)    MRSA PCR Result.: Negative (02-12-24 @ 10:28)    2/9 BCX NGTD x2    2/3 BCX NGTD     2/1 BCX NGTD     2/1 UCX   >=3 organisms. Probable collection contamination.    1/31 BCX NG    Rapid RVP Result: NotDetec (02-04-24 @ 10:28)    MRSA PCR Result.: Negative (02-03-24 @ 21:32)     UA without significant pyuria   Procalcitonin, Serum: 0.22 (02-01-24 @ 16:00)--> Procalcitonin, Serum: 0.15 (02-03-24 @ 11:13), downtrending ; unremarkable   MRSA PCR Result.: Negative (01-22-24 @ 05:30)  < from: Xray Chest 1 View-PORTABLE IMMEDIATE (Xray Chest 1 View-PORTABLE IMMEDIATE .) (02.01.24 @ 03:02) >  Bibasal opacities without significant change.   #Acute hypoxic respiratory failure- Gram Negative pneumonia   #1/20 BCX 1/4 bottles : Staphylococcus hominis- contaminant   Repeat CX NG   #Severe Sepsis on admission  #RSV + 1/21  #Elevated fungitell   Fungitell: 63 (02-06-24 @ 11:27)  Fungitell: 325 (01-20-24 @ 21:23)  Fungitell: 138 (11-07-23 @ 08:08)  Fungitell: 115 (11-02-23 @ 11:40)  Fungitell: >500 pg/mL (10.17.23 @ 17:20) s/p empiric caspo   #Full thickness ulcer right heel- Appreciate burn/podiatry evaluation.  #History of Right planter foot ulcers - two full thickness ulcers - serous drainage with mild erythema with OM  - MR Foot No Cont, Right (10.16.23 @ 21:51): IMPRESSION: 1.  Limited exam. 2.  Osteomyelitis of the first metatarsal stump. 3.  Osteomyelitis of the second toe distal phalanx.  - s/p excidional debridement to and including bone 1st metatarsal with partial 2nd digit amputation - 1st metatarsal head resected - Wound Cx Proteus ESBL   #CKD 2-3 Creatinine: 0.7 mg/dL (02.02.24 @ 04:59)      #History of buttock ulcer  #Down syndrome/Cerebral Palsy     RECOMMENDATIONS  - CXR white out L lung, improving   - Continue meropenem- end 2/15 to complete 14 days , monitor closely as continued significant opacities on CXR and recent fevers, likely resolving mucous plug  - Fungitell started downtrending 1 day after Caspo started, doubt related, but at this point given critical illness would plan on completing a course  - Continue caspofungin 50mg q24h IV , hx unclear elevated fungitell. No clear source, will likely need empiric 14 day course. 2/18. No risk factors for PCP or other invasive fungal infection   - CT Chest, AP when stable   - Grave prognosis, GOC     If any questions, please send a message or call on [employer] Teams  Please continue to update ID with any pertinent new laboratory or radiographic findings.

## 2024-02-14 NOTE — PROGRESS NOTE ADULT - SUBJECTIVE AND OBJECTIVE BOX
58F w/ PMHx Down Syndrome, nonverbal at baseline, Hypothyroidism, Cerebral palsy and Seizure Disorders presents to the ED from nursing facility/group home ([facility]) presented to ED for respiratory distress and high grade fever. Patient found to be septic on admission.Admitted to SDU for management of acute respiratory distress 2/2 CAP/Aspiration PNA (20 Jan 2024 18:22)  While pt was on the floor she developed dyspnea after swallow evaluation, was spiking high grade fever, consulted by pulmonary/critical care and was upgraded back to SDU.  Pt developed left lung white out, improved after aggressive chest PT, treated with Meropenem and caspofungin, ID is following, her overall prognosis is very poor, she might need trach and PEG in the nearest fufure, a meeting with facility staff and state took place on 2/14.   Today pt is awake, comfortable, nonverbal.       PAST MEDICAL & SURGICAL HISTORY:  Down syndrome  Osteoporosis  Mild anemia  Neuropathy  S/P debridement of R hip on 3/2/21    Allergies  No Known Allergies    Vital Signs Last 24 Hrs  T(C): 36.8 (14 Feb 2024 11:00), Max: 37.2 (13 Feb 2024 16:00)  T(F): 98.2 (14 Feb 2024 11:00), Max: 98.9 (13 Feb 2024 16:00)  HR: 69 (14 Feb 2024 11:00) (68 - 109)  BP: 108/62 (14 Feb 2024 11:00) (84/49 - 132/62)  BP(mean): 79 (14 Feb 2024 11:00) (65 - 79)  RR: 18 (14 Feb 2024 11:00) (18 - 20)  SpO2: 97% (14 Feb 2024 11:00) (91% - 97%)    Parameters below as of 14 Feb 2024 11:00  Patient On (Oxygen Delivery Method): nasal cannula, high flow      PHYSICAL EXAM:   GENERAL: in mild distress, anxious with eyes open, nonverbal   HEENT: atraumatic, EOMI.  Lungs: no BS at left base, decreased BS b/l   CVS: SIS2 +,  no murmur.  Abdomen/ GI:  Soft,  NT, ND, BS+  CNS: awake , non verbal, anxious   Ext: contracted  Skin: no rash, no ulcers.    LABs:                           8.2    11.22 )-----------( 705      ( 14 Feb 2024 07:26 )             27.1   02-14    137  |  99  |  12  ----------------------------<  161<H>  4.8   |  29  |  0.5<L>    Ca    8.1<L>      14 Feb 2024 07:26  Mg     2.5     02-13          Parameters below as of 04 Feb 2024 08:47  Patient On (Oxygen Delivery Method): nasal cannula, high flow  O2 Flow (L/min): 60  O2 Concentration (%): 100  Urinalysis Basic - ( 01 Feb 2024 11:40 )    Color: Yellow / Appearance: Turbid / SG: >1.030 / pH: x  Gluc: x / Ketone: Negative mg/dL  / Bili: Negative / Urobili: 1.0 mg/dL   Blood: x / Protein: 100 mg/dL / Nitrite: Negative   Leuk Esterase: Negative / RBC: 10 /HPF / WBC 5 /HPF   Sq Epi: x / Non Sq Epi: 2 /HPF / Bacteria: Negative /HPF    Culture - Blood (01.31.24 @ 18:21)   Specimen Source: .Blood None  Culture Results:   No growth at 24 hoursCulture - Urine (01.23.24 @ 05:20)   Specimen Source: Clean Catch Clean Catch (Midstream)  Culture Results:   No growthCulture - Blood (01.20.24 @ 16:15)   - Coagulase negative Staphylococcus: Detec  Gram Stain:   Growth in aerobic bottle: Gram positive cocci in pairs  Specimen Source: .Blood Blood-Peripheral  Organism: Blood Culture PCR  Culture Results:   Growth in aerobic bottle: Staphylococcus hominis   Isolation of Coagulase negative Staphylococcus from single blood culture     sets may represent   contamination. Contact the Microbiology Department at [phone] if   susceptibility testing is   clinically indicated.   Direct identification is available within approximately 3-5   hours either by Blood Panel Multiplexed PCR or Direct   MALDI-TOF. Details: [email or web address]  Organism Identification: Blood Culture PCR  Method Type: PCR    Culture - Blood (02.12.24 @ 11:17)   Specimen Source: .Blood None  Culture Results:   No growth at 24 hours  RADIOLOGY:     < from: Xray Chest 1 View-PORTABLE IMMEDIATE (Xray Chest 1 View-PORTABLE IMMEDIATE .) (02.14.24 @ 08:11) >  IMPRESSION:    1. Enteric tube seen to the level of the stomach.  2. Bilateral opacities, worsened on the left    < end of copied text >  < from: [facility] Duplex Lower Ext Vein Scan, Bilat (02.11.24 @ 13:47) >    IMPRESSION:  No evidence of deep venous thrombosis in either lower extremity.    < end of copied text >      < from: VA Duplex Lower Ext Vein Scan, Bilat (01.22.24 @ 14:52) >  IMPRESSION:  No evidence of deep venous thrombosis in either lower extremity.    < end of copied text >  < from: TTE Echo Complete w/ Contrast w/o Doppler (01.22.24 @ 13:35) >  Summary:   1. Technically difficult and limited study.   2. Endocardial visualization was enhanced with intravenous echo contrast.   3. Left ventricular ejection fraction, by visual estimation, is >55%.   4. Normal global left ventricular systolic function.   5. The left ventricular diastolic function could not be assessed in this   study.  < from: Xray Chest 1 View- PORTABLE-Routine (Xray Chest 1 View- PORTABLE-Routine in AM.) (02.03.24 @ 06:48) >    Impression:    Stable bilateral opacities        MEDICATIONS  (STANDING):  acetylcysteine 20%  Inhalation 4 milliLiter(s) Inhalation every 4 hours  albuterol/ipratropium for Nebulization 3 milliLiter(s) Nebulizer every 4 hours  artificial  tears Solution 1 Drop(s) Both EYES two times a day  calcium carbonate   1250 mG (OsCal) 1 Tablet(s) Oral two times a day  caspofungin IVPB      caspofungin IVPB 50 milliGRAM(s) IV Intermittent every 24 hours  chlorhexidine 2% Cloths 1 Application(s) Topical daily  enoxaparin Injectable 50 milliGRAM(s) SubCutaneous every 12 hours  gabapentin 300 milliGRAM(s) Oral every 12 hours  levETIRAcetam  Solution 500 milliGRAM(s) Oral every 12 hours  melatonin 3 milliGRAM(s) Oral at bedtime  meropenem  IVPB 1000 milliGRAM(s) IV Intermittent every 8 hours  midodrine. 20 milliGRAM(s) Oral three times a day  norepinephrine Infusion 0.05 MICROgram(s)/kG/Min (4.9 mL/Hr) IV Continuous <Continuous>  pantoprazole  Injectable 40 milliGRAM(s) IV Push every 24 hours  polyethylene glycol 3350 17 Gram(s) Oral at bedtime  raloxifene 60 milliGRAM(s) Oral daily  saccharomyces boulardii 250 milliGRAM(s) Oral two times a day  senna 2 Tablet(s) Oral at bedtime  silver sulfADIAZINE 1% Cream 1 Application(s) Topical two times a day  sodium chloride 0.65% Nasal 2 Spray(s) Both Nostrils three times a day  sodium chloride 3%  Inhalation 4 milliLiter(s) Inhalation every 4 hours    MEDICATIONS  (PRN):  acetaminophen     Tablet .. 650 milliGRAM(s) Oral every 6 hours PRN Temp greater or equal to 38C (100.4F), Mild Pain (1 - 3)  aluminum hydroxide/magnesium hydroxide/simethicone Suspension 30 milliLiter(s) Oral every 4 hours PRN Dyspepsia  ondansetron Injectable 4 milliGRAM(s) IV Push every 8 hours PRN Nausea and/or Vomiting

## 2024-02-14 NOTE — PROGRESS NOTE ADULT - SUBJECTIVE AND OBJECTIVE BOX
TEA ECHAVARRIA  58y, Female  Allergy: No Known Allergies      LOS  25d    CHIEF COMPLAINT: Respiratory Distress (14 Feb 2024 09:05)      INTERVAL EVENTS/HPI  - No acute events overnight  - T(F): , Max: 99 (02-13-24 @ 12:00)  - Tolerating medication  - WBC Count: 11.22 (02-14-24 @ 07:26)  WBC Count: 12.16 (02-13-24 @ 06:26)     - Creatinine: 0.5 (02-14-24 @ 07:26)  Creatinine: 0.5 (02-13-24 @ 06:26)       ROS  ***    VITALS:  T(F): 97.5, Max: 99 (02-13-24 @ 12:00)  HR: 69  BP: 128/53  RR: 18Vital Signs Last 24 Hrs  T(C): 36.4 (14 Feb 2024 07:40), Max: 37.2 (13 Feb 2024 12:00)  T(F): 97.5 (14 Feb 2024 07:40), Max: 99 (13 Feb 2024 12:00)  HR: 69 (14 Feb 2024 07:40) (63 - 109)  BP: 128/53 (14 Feb 2024 07:40) (84/49 - 132/62)  BP(mean): 77 (14 Feb 2024 07:40) (63 - 77)  RR: 18 (14 Feb 2024 07:40) (18 - 20)  SpO2: 95% (14 Feb 2024 08:16) (91% - 99%)    Parameters below as of 14 Feb 2024 08:16  Patient On (Oxygen Delivery Method): nasal cannula, high flow  O2 Flow (L/min): 60  O2 Concentration (%): 90    PHYSICAL EXAM:  ***    FH: Non-contributory  Social Hx: Non-contributory    TESTS & MEASUREMENTS:                        8.2    11.22 )-----------( 705      ( 14 Feb 2024 07:26 )             27.1     02-14    137  |  99  |  12  ----------------------------<  161<H>  4.8   |  29  |  0.5<L>    Ca    8.1<L>      14 Feb 2024 07:26  Mg     2.5     02-13          Urinalysis Basic - ( 14 Feb 2024 07:26 )    Color: x / Appearance: x / SG: x / pH: x  Gluc: 161 mg/dL / Ketone: x  / Bili: x / Urobili: x   Blood: x / Protein: x / Nitrite: x   Leuk Esterase: x / RBC: x / WBC x   Sq Epi: x / Non Sq Epi: x / Bacteria: x        Culture - Blood (collected 02-12-24 @ 11:17)  Source: .Blood None  Preliminary Report (02-13-24 @ 21:00):    No growth at 24 hours    Culture - Blood (collected 02-09-24 @ 11:57)  Source: .Blood None  Preliminary Report (02-13-24 @ 22:00):    No growth at 4 days    Culture - Blood (collected 02-09-24 @ 11:57)  Source: .Blood None  Preliminary Report (02-13-24 @ 22:00):    No growth at 4 days    Culture - Blood (collected 02-03-24 @ 11:13)  Source: .Blood None  Final Report (02-08-24 @ 20:00):    No growth at 5 days    Culture - Blood (collected 02-01-24 @ 11:58)  Source: .Blood None  Final Report (02-06-24 @ 23:06):    No growth at 5 days    Culture - Urine (collected 02-01-24 @ 11:40)  Source: Clean Catch Clean Catch (Midstream)  Final Report (02-03-24 @ 00:06):    >=3 organisms. Probable collection contamination.    Culture - Blood (collected 01-31-24 @ 18:21)  Source: .Blood None  Final Report (02-06-24 @ 01:01):    No growth at 5 days    Culture - Blood (collected 01-31-24 @ 18:21)  Source: .Blood None  Final Report (02-06-24 @ 01:01):    No growth at 5 days    Culture - Urine (collected 01-23-24 @ 05:20)  Source: Clean Catch Clean Catch (Midstream)  Final Report (01-25-24 @ 00:26):    No growth    Culture - Blood (collected 01-22-24 @ 16:51)  Source: .Blood None  Final Report (01-28-24 @ 01:00):    No growth at 5 days    Culture - Urine (collected 01-21-24 @ 05:00)  Source: Clean Catch Clean Catch (Midstream)  Final Report (01-22-24 @ 07:15):    No growth    Culture - Blood (collected 01-20-24 @ 16:15)  Source: .Blood Blood-Peripheral  Gram Stain (01-21-24 @ 20:33):    Growth in aerobic bottle: Gram positive cocci in pairs  Final Report (01-22-24 @ 19:04):    Growth in aerobic bottle: Staphylococcus hominis    Isolation of Coagulase negative Staphylococcus from single blood culture    sets may represent    contamination. Contact the Microbiology Department at [phone] if    susceptibility testing is    clinically indicated.    Direct identification is available within approximately 3-5    hours either by Blood Panel Multiplexed PCR or Direct    MALDI-TOF. Details: [email or web address]  Organism: Blood Culture PCR (01-22-24 @ 19:04)  Organism: Blood Culture PCR (01-22-24 @ 19:04)      Method Type: PCR      -  Coagulase negative Staphylococcus: Detec    Culture - Blood (collected 01-20-24 @ 16:15)  Source: .Blood Blood-Peripheral  Final Report (01-25-24 @ 23:00):    No growth at 5 days            INFECTIOUS DISEASES TESTING  Procalcitonin, Serum: 0.10 (02-12-24 @ 11:17)  Rapid RVP Result: NotDetec (02-12-24 @ 10:28)  MRSA PCR Result.: Negative (02-12-24 @ 10:28)  Fungitell: 63 (02-06-24 @ 11:27)  Fungitell: 65 (02-06-24 @ 04:43)  Rapid RVP Result: NotDetec (02-04-24 @ 10:28)  MRSA PCR Result.: Negative (02-03-24 @ 21:32)  Procalcitonin, Serum: 0.15 (02-03-24 @ 11:13)  Procalcitonin, Serum: 0.22 (02-01-24 @ 16:00)  MRSA PCR Result.: Negative (01-22-24 @ 05:30)  Streptococcus pneumoniae Ag, Ur Result: Negative (01-21-24 @ 05:00)  Legionella Antigen, Urine: Negative (01-21-24 @ 05:00)  Rapid RVP Result: Detected (01-21-24 @ 00:58)  Procalcitonin, Serum: 0.51 (01-20-24 @ 21:23)  Fungitell: 325 (01-20-24 @ 21:23)  Vancomycin Level, Trough: 5.9 (11-12-23 @ 17:55)  Fungitell: 138 (11-07-23 @ 08:08)  Fungitell: 115 (11-02-23 @ 11:40)  Procalcitonin, Serum: 0.04 (11-02-23 @ 11:40)  Procalcitonin, Serum: 0.26 (10-24-23 @ 11:00)  MRSA PCR Result.: Negative (10-17-23 @ 17:20)  Fungitell: >500 (10-17-23 @ 17:20)  Procalcitonin, Serum: 4.36 (10-17-23 @ 17:20)  Procalcitonin, Serum: 4.18 (10-17-23 @ 12:35)  Procalcitonin, Serum: 0.07 (10-15-23 @ 07:28)  COVID-19 PCR: NotDetec (10-12-23 @ 09:14)  Procalcitonin, Serum: 0.40 (10-07-23 @ 10:54)  Streptococcus pneumoniae Ag, Ur Result: Negative (09-30-23 @ 14:36)  Legionella Antigen, Urine: Negative (09-30-23 @ 14:36)  MRSA PCR Result.: Negative (09-29-23 @ 16:50)  Procalcitonin, Serum: 9.78 (08-12-23 @ 11:25)  MRSA PCR Result.: Negative (08-12-23 @ 06:10)  Rapid RVP Result: NotDetec (08-12-23 @ 01:33)  Procalcitonin, Serum: 0.63 (08-10-23 @ 08:46)  Procalcitonin, Serum: 7.82 (08-04-23 @ 16:13)  MRSA PCR Result.: Negative (08-04-23 @ 14:52)  Rapid RVP Result: NotDetec (08-04-23 @ 03:00)  strept    INFLAMMATORY MARKERS  Sedimentation Rate, Erythrocyte: 100 mm/Hr (02-03-24 @ 11:13)  C-Reactive Protein, Serum: 214.2 mg/L (02-03-24 @ 11:13)  C-Reactive Protein, Serum: 132.3 mg/L (02-01-24 @ 16:00)  Sedimentation Rate, Erythrocyte: 67 mm/Hr (10-15-23 @ 07:28)      RADIOLOGY & ADDITIONAL TESTS:  I have personally reviewed the last available Chest xray  CXR  Xray Chest 1 View- PORTABLE-Urgent:   ACC: [id number] EXAM:  XR CHEST PORTABLE URGENT 1V   ORDERED BY: DARIUSZ MEEK     PROCEDURE DATE:  02/12/2024          INTERPRETATION:  Clinical History / Reason for exam: Follow-up.    Comparison : Chest radiograph February 12, 2024.    Technique/Positioning: Low lung volume.    Findings:    Support devices: NGT with its tip below the diaphragm.    Cardiac/mediastinum/hilum: Obscured    Lung parenchyma/Pleura: Bilateral opacities, unchanged. No pneumothorax   is seen.    Skeleton/soft tissues:Stable    Impression:    Low lung volume.    Bilateral opacities, unchanged.    NGT.    --- End of Report ---            HUY TAYLOR MD; Attending Radiologist  This document has been electronically signed. Feb 12 2024  4:21PM (02-12-24 @ 15:54)      CT      CARDIOLOGY TESTING  12 Lead ECG:   Ventricular Rate 118 BPM    Atrial Rate 118 BPM    P-R Interval 122 ms    QRS Duration 64 ms    Q-T Interval 328 ms    QTC Calculation(Bazett) 459 ms    P Axis 54 degrees    R Axis 93 degrees    T Axis 58 degrees    Diagnosis Line Sinus tachycardia  Rightward axis  Low voltage QRS  Borderline ECG    Confirmed by MARJAN MENDES MD ([id number]) on 2/2/2024 7:15:17 AM (02-01-24 @ 14:30)  12 Lead ECG:   Ventricular Rate 88 BPM    Atrial Rate 88 BPM    P-R Interval 112 ms    QRS Duration 64 ms    Q-T Interval 362 ms    QTC Calculation(Bazett) 438 ms    P Axis -11 degrees    R Axis 95 degrees    T Axis 59 degrees    Diagnosis Line Normal sinus rhythm  Rightward axis  Borderline ECG    Confirmed by huy roberts ([id number]) on 1/31/2024 2:01:26 PM (01-31-24 @ 11:11)      MEDICATIONS  acetylcysteine 20%  Inhalation 4 Inhalation every 4 hours  albuterol/ipratropium for Nebulization 3 Nebulizer every 4 hours  artificial  tears Solution 1 Both EYES two times a day  calcium carbonate   1250 mG (OsCal) 1 Oral two times a day  caspofungin IVPB 50 IV Intermittent every 24 hours  caspofungin IVPB     chlorhexidine 2% Cloths 1 Topical daily  enoxaparin Injectable 50 SubCutaneous every 12 hours  gabapentin 300 Oral every 12 hours  levETIRAcetam  Solution 500 Oral every 12 hours  melatonin 3 Oral at bedtime  meropenem  IVPB 1000 IV Intermittent every 8 hours  midodrine. 20 Oral three times a day  norepinephrine Infusion 0.05 IV Continuous <Continuous>  pantoprazole  Injectable 40 IV Push every 24 hours  polyethylene glycol 3350 17 Oral at bedtime  raloxifene 60 Oral daily  scopolamine 1 mG/72 Hr(s) Patch 1 Transdermal every 72 hours  senna 2 Oral at bedtime  silver sulfADIAZINE 1% Cream 1 Topical two times a day  sodium chloride 0.65% Nasal 2 Both Nostrils three times a day  sodium chloride 3%  Inhalation 4 Inhalation every 4 hours      WEIGHT  Weight (kg): 52.3 (01-21-24 @ 08:00)  Creatinine: 0.5 mg/dL (02-14-24 @ 07:26)      ANTIBIOTICS:  caspofungin IVPB 50 milliGRAM(s) IV Intermittent every 24 hours  caspofungin IVPB      meropenem  IVPB 1000 milliGRAM(s) IV Intermittent every 8 hours      All available historical records have been reviewed       JERICHO TEA  58y, Female  Allergy: No Known Allergies      LOS  25d    CHIEF COMPLAINT: Respiratory Distress (14 Feb 2024 09:05)      INTERVAL EVENTS/HPI  - No acute events overnight HFNC  - T(F): , Max: 99 (02-13-24 @ 12:00)  - Tolerating medication  - WBC Count: 11.22 (02-14-24 @ 07:26)  WBC Count: 12.16 (02-13-24 @ 06:26)     - Creatinine: 0.5 (02-14-24 @ 07:26)  Creatinine: 0.5 (02-13-24 @ 06:26)       ROS  unable to obtain history secondary to patient's mental status and/or sedation     VITALS:  T(F): 97.5, Max: 99 (02-13-24 @ 12:00)  HR: 69  BP: 128/53  RR: 18Vital Signs Last 24 Hrs  T(C): 36.4 (14 Feb 2024 07:40), Max: 37.2 (13 Feb 2024 12:00)  T(F): 97.5 (14 Feb 2024 07:40), Max: 99 (13 Feb 2024 12:00)  HR: 69 (14 Feb 2024 07:40) (63 - 109)  BP: 128/53 (14 Feb 2024 07:40) (84/49 - 132/62)  BP(mean): 77 (14 Feb 2024 07:40) (63 - 77)  RR: 18 (14 Feb 2024 07:40) (18 - 20)  SpO2: 95% (14 Feb 2024 08:16) (91% - 99%)    Parameters below as of 14 Feb 2024 08:16  Patient On (Oxygen Delivery Method): nasal cannula, high flow  O2 Flow (L/min): 60  O2 Concentration (%): 90    PHYSICAL EXAM:  Gen: chronically ill appearing HFNC  HEENT: Normocephalic, atraumatic  Neck: supple, no lymphadenopathy  CV: Regular rate & regular rhythm  Lungs: decreased BS at bases, no fremitus  Abdomen: Soft, BS present  Ext: Warm, well perfused  Neuro: non focal, not following commands  Skin: no rash, no erythema  Lines: no phlebitis   FH: Non-contributory  Social Hx: Non-contributory    TESTS & MEASUREMENTS:                        8.2    11.22 )-----------( 705      ( 14 Feb 2024 07:26 )             27.1     02-14    137  |  99  |  12  ----------------------------<  161<H>  4.8   |  29  |  0.5<L>    Ca    8.1<L>      14 Feb 2024 07:26  Mg     2.5     02-13          Urinalysis Basic - ( 14 Feb 2024 07:26 )    Color: x / Appearance: x / SG: x / pH: x  Gluc: 161 mg/dL / Ketone: x  / Bili: x / Urobili: x   Blood: x / Protein: x / Nitrite: x   Leuk Esterase: x / RBC: x / WBC x   Sq Epi: x / Non Sq Epi: x / Bacteria: x        Culture - Blood (collected 02-12-24 @ 11:17)  Source: .Blood None  Preliminary Report (02-13-24 @ 21:00):    No growth at 24 hours    Culture - Blood (collected 02-09-24 @ 11:57)  Source: .Blood None  Preliminary Report (02-13-24 @ 22:00):    No growth at 4 days    Culture - Blood (collected 02-09-24 @ 11:57)  Source: .Blood None  Preliminary Report (02-13-24 @ 22:00):    No growth at 4 days    Culture - Blood (collected 02-03-24 @ 11:13)  Source: .Blood None  Final Report (02-08-24 @ 20:00):    No growth at 5 days    Culture - Blood (collected 02-01-24 @ 11:58)  Source: .Blood None  Final Report (02-06-24 @ 23:06):    No growth at 5 days    Culture - Urine (collected 02-01-24 @ 11:40)  Source: Clean Catch Clean Catch (Midstream)  Final Report (02-03-24 @ 00:06):    >=3 organisms. Probable collection contamination.    Culture - Blood (collected 01-31-24 @ 18:21)  Source: .Blood None  Final Report (02-06-24 @ 01:01):    No growth at 5 days    Culture - Blood (collected 01-31-24 @ 18:21)  Source: .Blood None  Final Report (02-06-24 @ 01:01):    No growth at 5 days    Culture - Urine (collected 01-23-24 @ 05:20)  Source: Clean Catch Clean Catch (Midstream)  Final Report (01-25-24 @ 00:26):    No growth    Culture - Blood (collected 01-22-24 @ 16:51)  Source: .Blood None  Final Report (01-28-24 @ 01:00):    No growth at 5 days    Culture - Urine (collected 01-21-24 @ 05:00)  Source: Clean Catch Clean Catch (Midstream)  Final Report (01-22-24 @ 07:15):    No growth    Culture - Blood (collected 01-20-24 @ 16:15)  Source: .Blood Blood-Peripheral  Gram Stain (01-21-24 @ 20:33):    Growth in aerobic bottle: Gram positive cocci in pairs  Final Report (01-22-24 @ 19:04):    Growth in aerobic bottle: Staphylococcus hominis    Isolation of Coagulase negative Staphylococcus from single blood culture    sets may represent    contamination. Contact the Microbiology Department at [phone] if    susceptibility testing is    clinically indicated.    Direct identification is available within approximately 3-5    hours either by Blood Panel Multiplexed PCR or Direct    MALDI-TOF. Details: [email or web address]  Organism: Blood Culture PCR (01-22-24 @ 19:04)  Organism: Blood Culture PCR (01-22-24 @ 19:04)      Method Type: PCR      -  Coagulase negative Staphylococcus: Detec    Culture - Blood (collected 01-20-24 @ 16:15)  Source: .Blood Blood-Peripheral  Final Report (01-25-24 @ 23:00):    No growth at 5 days            INFECTIOUS DISEASES TESTING  Procalcitonin, Serum: 0.10 (02-12-24 @ 11:17)  Rapid RVP Result: NotDetec (02-12-24 @ 10:28)  MRSA PCR Result.: Negative (02-12-24 @ 10:28)  Fungitell: 63 (02-06-24 @ 11:27)  Fungitell: 65 (02-06-24 @ 04:43)  Rapid RVP Result: NotDetec (02-04-24 @ 10:28)  MRSA PCR Result.: Negative (02-03-24 @ 21:32)  Procalcitonin, Serum: 0.15 (02-03-24 @ 11:13)  Procalcitonin, Serum: 0.22 (02-01-24 @ 16:00)  MRSA PCR Result.: Negative (01-22-24 @ 05:30)  Streptococcus pneumoniae Ag, Ur Result: Negative (01-21-24 @ 05:00)  Legionella Antigen, Urine: Negative (01-21-24 @ 05:00)  Rapid RVP Result: Detected (01-21-24 @ 00:58)  Procalcitonin, Serum: 0.51 (01-20-24 @ 21:23)  Fungitell: 325 (01-20-24 @ 21:23)  Vancomycin Level, Trough: 5.9 (11-12-23 @ 17:55)  Fungitell: 138 (11-07-23 @ 08:08)  Fungitell: 115 (11-02-23 @ 11:40)  Procalcitonin, Serum: 0.04 (11-02-23 @ 11:40)  Procalcitonin, Serum: 0.26 (10-24-23 @ 11:00)  MRSA PCR Result.: Negative (10-17-23 @ 17:20)  Fungitell: >500 (10-17-23 @ 17:20)  Procalcitonin, Serum: 4.36 (10-17-23 @ 17:20)  Procalcitonin, Serum: 4.18 (10-17-23 @ 12:35)  Procalcitonin, Serum: 0.07 (10-15-23 @ 07:28)  COVID-19 PCR: NotDetec (10-12-23 @ 09:14)  Procalcitonin, Serum: 0.40 (10-07-23 @ 10:54)  Streptococcus pneumoniae Ag, Ur Result: Negative (09-30-23 @ 14:36)  Legionella Antigen, Urine: Negative (09-30-23 @ 14:36)  MRSA PCR Result.: Negative (09-29-23 @ 16:50)  Procalcitonin, Serum: 9.78 (08-12-23 @ 11:25)  MRSA PCR Result.: Negative (08-12-23 @ 06:10)  Rapid RVP Result: NotDetec (08-12-23 @ 01:33)  Procalcitonin, Serum: 0.63 (08-10-23 @ 08:46)  Procalcitonin, Serum: 7.82 (08-04-23 @ 16:13)  MRSA PCR Result.: Negative (08-04-23 @ 14:52)  Rapid RVP Result: NotDetec (08-04-23 @ 03:00)  strept    INFLAMMATORY MARKERS  Sedimentation Rate, Erythrocyte: 100 mm/Hr (02-03-24 @ 11:13)  C-Reactive Protein, Serum: 214.2 mg/L (02-03-24 @ 11:13)  C-Reactive Protein, Serum: 132.3 mg/L (02-01-24 @ 16:00)  Sedimentation Rate, Erythrocyte: 67 mm/Hr (10-15-23 @ 07:28)      RADIOLOGY & ADDITIONAL TESTS:  I have personally reviewed the last available Chest xray  CXR  Xray Chest 1 View- PORTABLE-Urgent:   ACC: [id number] EXAM:  XR CHEST PORTABLE URGENT 1V   ORDERED BY: DARIUSZ MEEK     PROCEDURE DATE:  02/12/2024          INTERPRETATION:  Clinical History / Reason for exam: Follow-up.    Comparison : Chest radiograph February 12, 2024.    Technique/Positioning: Low lung volume.    Findings:    Support devices: NGT with its tip below the diaphragm.    Cardiac/mediastinum/hilum: Obscured    Lung parenchyma/Pleura: Bilateral opacities, unchanged. No pneumothorax   is seen.    Skeleton/soft tissues:Stable    Impression:    Low lung volume.    Bilateral opacities, unchanged.    NGT.    --- End of Report ---            HUY TAYLOR MD; Attending Radiologist  This document has been electronically signed. Feb 12 2024  4:21PM (02-12-24 @ 15:54)      CT      CARDIOLOGY TESTING  12 Lead ECG:   Ventricular Rate 118 BPM    Atrial Rate 118 BPM    P-R Interval 122 ms    QRS Duration 64 ms    Q-T Interval 328 ms    QTC Calculation(Bazett) 459 ms    P Axis 54 degrees    R Axis 93 degrees    T Axis 58 degrees    Diagnosis Line Sinus tachycardia  Rightward axis  Low voltage QRS  Borderline ECG    Confirmed by MARJAN MENDES MD ([id number]) on 2/2/2024 7:15:17 AM (02-01-24 @ 14:30)  12 Lead ECG:   Ventricular Rate 88 BPM    Atrial Rate 88 BPM    P-R Interval 112 ms    QRS Duration 64 ms    Q-T Interval 362 ms    QTC Calculation(Bazett) 438 ms    P Axis -11 degrees    R Axis 95 degrees    T Axis 59 degrees    Diagnosis Line Normal sinus rhythm  Rightward axis  Borderline ECG    Confirmed by huy roberts ([id number]) on 1/31/2024 2:01:26 PM (01-31-24 @ 11:11)      MEDICATIONS  acetylcysteine 20%  Inhalation 4 Inhalation every 4 hours  albuterol/ipratropium for Nebulization 3 Nebulizer every 4 hours  artificial  tears Solution 1 Both EYES two times a day  calcium carbonate   1250 mG (OsCal) 1 Oral two times a day  caspofungin IVPB 50 IV Intermittent every 24 hours  caspofungin IVPB     chlorhexidine 2% Cloths 1 Topical daily  enoxaparin Injectable 50 SubCutaneous every 12 hours  gabapentin 300 Oral every 12 hours  levETIRAcetam  Solution 500 Oral every 12 hours  melatonin 3 Oral at bedtime  meropenem  IVPB 1000 IV Intermittent every 8 hours  midodrine. 20 Oral three times a day  norepinephrine Infusion 0.05 IV Continuous <Continuous>  pantoprazole  Injectable 40 IV Push every 24 hours  polyethylene glycol 3350 17 Oral at bedtime  raloxifene 60 Oral daily  scopolamine 1 mG/72 Hr(s) Patch 1 Transdermal every 72 hours  senna 2 Oral at bedtime  silver sulfADIAZINE 1% Cream 1 Topical two times a day  sodium chloride 0.65% Nasal 2 Both Nostrils three times a day  sodium chloride 3%  Inhalation 4 Inhalation every 4 hours      WEIGHT  Weight (kg): 52.3 (01-21-24 @ 08:00)  Creatinine: 0.5 mg/dL (02-14-24 @ 07:26)      ANTIBIOTICS:  caspofungin IVPB 50 milliGRAM(s) IV Intermittent every 24 hours  caspofungin IVPB      meropenem  IVPB 1000 milliGRAM(s) IV Intermittent every 8 hours      All available historical records have been reviewed

## 2024-02-14 NOTE — PROGRESS NOTE ADULT - SUBJECTIVE AND OBJECTIVE BOX
Subjective:    Today is hospital day 25d. This morning patient was seen and examined at bedside, resting comfortably in bed.    No acute or major events overnight.    PAST MEDICAL & SURGICAL HISTORY  Down syndrome    Osteoporosis    Mild anemia    Neuropathy    S/P debridement  of R hip on 3/2/21    ALLERGIES:  No Known Allergies    MEDICATIONS:  STANDING MEDICATIONS  acetylcysteine 20%  Inhalation 4 milliLiter(s) Inhalation every 4 hours  albuterol/ipratropium for Nebulization 3 milliLiter(s) Nebulizer every 4 hours  artificial  tears Solution 1 Drop(s) Both EYES two times a day  calcium carbonate   1250 mG (OsCal) 1 Tablet(s) Oral two times a day  caspofungin IVPB      caspofungin IVPB 50 milliGRAM(s) IV Intermittent every 24 hours  chlorhexidine 2% Cloths 1 Application(s) Topical daily  enoxaparin Injectable 50 milliGRAM(s) SubCutaneous every 12 hours  gabapentin 300 milliGRAM(s) Oral every 12 hours  levETIRAcetam  Solution 500 milliGRAM(s) Oral every 12 hours  melatonin 3 milliGRAM(s) Oral at bedtime  meropenem  IVPB 1000 milliGRAM(s) IV Intermittent every 8 hours  midodrine. 20 milliGRAM(s) Oral three times a day  norepinephrine Infusion 0.05 MICROgram(s)/kG/Min IV Continuous <Continuous>  pantoprazole  Injectable 40 milliGRAM(s) IV Push every 24 hours  polyethylene glycol 3350 17 Gram(s) Oral at bedtime  raloxifene 60 milliGRAM(s) Oral daily  saccharomyces boulardii 250 milliGRAM(s) Oral two times a day  scopolamine 1 mG/72 Hr(s) Patch 1 Patch Transdermal every 72 hours  senna 2 Tablet(s) Oral at bedtime  silver sulfADIAZINE 1% Cream 1 Application(s) Topical two times a day  sodium chloride 0.65% Nasal 2 Spray(s) Both Nostrils three times a day  sodium chloride 3%  Inhalation 4 milliLiter(s) Inhalation every 4 hours  PRN MEDICATIONS  acetaminophen     Tablet .. 650 milliGRAM(s) Oral every 6 hours PRN  aluminum hydroxide/magnesium hydroxide/simethicone Suspension 30 milliLiter(s) Oral every 4 hours PRN  ondansetron Injectable 4 milliGRAM(s) IV Push every 8 hours PRN    Objective:  VITALS:   T(F): 98.2  HR: 69  BP: 108/62  RR: 18  SpO2: 97%    PHYSICAL EXAM:  GENERAL:  NAD chronically ill appearing, pale   PULMONARY: decreased breath sounds B/L. No wheezing.   CVS: Normal S1, S2. Rate and Rhythm are regular.   ABDOMEN/GI: Soft, Nontender, non distended   NEUROLOGIC: opens eyes at times, does not follow commands   PSYCH: lethargic   NO LLE    LABS:  LABS FOR NEXT DAY : Yes (x  ) No (  )                        8.2    11.22 )-----------( 705      ( 14 Feb 2024 07:26 )             27.1     02-14    137  |  99  |  12  ----------------------------<  161<H>  4.8   |  29  |  0.5<L>    Ca    8.1<L>      14 Feb 2024 07:26  Mg     2.5     02-13      Culture - Blood (collected 12 Feb 2024 11:17)  Source: .Blood None  Preliminary Report (13 Feb 2024 21:00):    No growth at 24 hours

## 2024-02-14 NOTE — PROGRESS NOTE ADULT - SUBJECTIVE AND OBJECTIVE BOX
HPI: 58F with Down syndrome, nonverbal at baseline, hypothyroidism, CP, and seizure disorder here from [facility] with fever and respiratory distress. Found to be septic on admission, from CAP/aspiration PNA, on IV vanco and meropenem, with course c/b continued fevers, and bacteremia with S. hominis. Also failed FEES, has NGT in place and will likely need PEG. Is requiring HFNC alternating with BiPAP. Is also on midodrine for hypotension. Patient is full code. Of note patient is under [facility]. Palliative care consulted for [facility].    INTERVAL EVENTS  2/6/24: patient appears comfortable  2/8/24: patient appears comfortable, on HFNC and pressors  2/9/24: patient comfortable resting, on HFNC, off pressors  2/12/24: patient on HFNC, on vanco, danna, appears comfortable  2/14/24: patient on HFNC, levophed    ADVANCE DIRECTIVES:    [X ] Full Code [ ] DNR  MOLST  [ ]  Living Will  [ ]   DECISION MAKER(s):  [ ] Health Care Proxy(s)  [ ] Surrogate(s)  [ ] Guardian           Name(s): Phone Number(s): [facility]    BASELINE (I)ADL(s) (prior to admission):  [place]: [ ]Total  [ ] Moderate [ ]Dependent  Palliative Performance Status Version 2:         %    http://npcrc.org/files/news/palliative_performance_scale_ppsv2.pdf    MEDICATIONS  (STANDING):  acetylcysteine 20%  Inhalation 4 milliLiter(s) Inhalation every 4 hours  albuterol/ipratropium for Nebulization 3 milliLiter(s) Nebulizer every 4 hours  artificial  tears Solution 1 Drop(s) Both EYES two times a day  calcium carbonate   1250 mG (OsCal) 1 Tablet(s) Oral two times a day  caspofungin IVPB      caspofungin IVPB 50 milliGRAM(s) IV Intermittent every 24 hours  chlorhexidine 2% Cloths 1 Application(s) Topical daily  enoxaparin Injectable 50 milliGRAM(s) SubCutaneous every 12 hours  gabapentin 300 milliGRAM(s) Oral every 12 hours  levETIRAcetam  Solution 500 milliGRAM(s) Oral every 12 hours  melatonin 3 milliGRAM(s) Oral at bedtime  meropenem  IVPB 1000 milliGRAM(s) IV Intermittent every 8 hours  midodrine. 20 milliGRAM(s) Oral three times a day  norepinephrine Infusion 0.05 MICROgram(s)/kG/Min (4.9 mL/Hr) IV Continuous <Continuous>  pantoprazole  Injectable 40 milliGRAM(s) IV Push every 24 hours  polyethylene glycol 3350 17 Gram(s) Oral at bedtime  raloxifene 60 milliGRAM(s) Oral daily  saccharomyces boulardii 250 milliGRAM(s) Oral two times a day  senna 2 Tablet(s) Oral at bedtime  silver sulfADIAZINE 1% Cream 1 Application(s) Topical two times a day  sodium chloride 0.65% Nasal 2 Spray(s) Both Nostrils three times a day  sodium chloride 3%  Inhalation 4 milliLiter(s) Inhalation every 4 hours    MEDICATIONS  (PRN):  acetaminophen     Tablet .. 650 milliGRAM(s) Oral every 6 hours PRN Temp greater or equal to 38C (100.4F), Mild Pain (1 - 3)  aluminum hydroxide/magnesium hydroxide/simethicone Suspension 30 milliLiter(s) Oral every 4 hours PRN Dyspepsia  ondansetron Injectable 4 milliGRAM(s) IV Push every 8 hours PRN Nausea and/or Vomiting      Allergies    No Known Allergies    Intolerances      PRESENT SYMPTOMS: [X ]Unable to obtain due to poor mentation   Source if other than patient:  [ ]Family   [ ]Team     Pain: [ ]yes [ ]no  QOL impact -   Location -                    Aggravating factors -  Quality -  Radiation -  Timing-  Severity (0-10 scale):  Minimal acceptable level (0-10 scale):     CPOT:    https://www.sccm.org/getattachment/ltd11x55-4h8o-1q2l-7k2z-5149j9857m7f/Critical-Care-Pain-Observation-Tool-(CPOT)    PAIN AD Score: 0  http://geriatrictoolkit.[place].[facility]/cog/painad.pdf (press ctrl +  left click to view)    Dyspnea:                           [ ]None[ ]Mild [ ]Moderate [ ]Severe     Respiratory Distress Observation Scale (RDOS): 0  A score of 0 to 2 signifies little or no respiratory distress, 3 signifies mild distress, scores 4 to 6 indicate moderate distress, and scores greater than 7 signify severe distress  https://[email or web address].ca/sites/default/files/PDFS/716176-gjvofcyrdog-wmobrfkr-zdomhldljqk-nbwgw.pdf    Anxiety:                             [ ]None[ ]Mild [ ]Moderate [ ]Severe   Fatigue:                             [ ]None[ ]Mild [ ]Moderate [ ]Severe   Nausea:                             [ ]None[ ]Mild [ ]Moderate [ ]Severe   Loss of appetite:              [ ]None[ ]Mild [ ]Moderate [ ]Severe   Constipation:                    [ ]None[ ]Mild [ ]Moderate [ ]Severe    Other Symptoms:  [ ]All other review of systems negative     Palliative Performance Status Version 2:         %    http://npcrc.org/files/news/palliative_performance_scale_ppsv2.pdf  PHYSICAL EXAM:  Vital Signs Last 24 Hrs  T(C): 36.8 (14 Feb 2024 11:00), Max: 37.2 (13 Feb 2024 16:00)  T(F): 98.2 (14 Feb 2024 11:00), Max: 98.9 (13 Feb 2024 16:00)  HR: 69 (14 Feb 2024 11:00) (68 - 109)  BP: 108/62 (14 Feb 2024 11:00) (84/49 - 132/62)  BP(mean): 79 (14 Feb 2024 11:00) (65 - 79)  RR: 18 (14 Feb 2024 11:00) (18 - 20)  SpO2: 97% (14 Feb 2024 11:00) (91% - 97%)    Parameters below as of 14 Feb 2024 11:00  Patient On (Oxygen Delivery Method): nasal cannula, high flow        GENERAL:  [X ] No acute distress [ ]Lethargic  [ ]Unarousable  [ ]Verbal  [ ]Non-Verbal [ ]Cachexia    BEHAVIORAL/PSYCH:  [ ]Alert and Oriented x  [ ] Anxiety [ ] Delirium [ ] Agitation [X ] Calm   EYES: [ X] No scleral icterus [ ] Scleral icterus [ ] Closed  ENMT:  [ ]Dry mouth  [ ]No external oral lesions [ X] No external ear or nose lesions  CARDIOVASCULAR:  [ ]Regular [ ]Irregular [ ]Tachy [ ]Not Tachy  [ ]Raheem [ ] Edema [ ] No edema  PULMONARY:  [ ]Tachypnea  [ ]Audible excessive secretions [X ] No labored breathing [ ] labored breathing  GASTROINTESTINAL: [ ]Soft  [ ]Distended  [ X]Not distended [ ]Non tender [ ]Tender  MUSCULOSKELETAL: [ ]No clubbing [ ] clubbing  [ X] No cyanosis [ ] cyanosis  NEUROLOGIC: [ ]No focal deficits  [ ]Follows commands  [ ]Does not follow commands  [X ]Cognitive impairment  [ ]Dysphagia  [ ]Dysarthria  [ ]Paresis   SKIN: [ ] Jaundiced [X ] Non-jaundiced [ ]Rash [ ]No Rash [ ] Warm [ ] Dry  MISC/LINES: [ ] ET tube [ ] Trach [ ]NGT/OGT [ ]PEG [ ]Madsen    CRITICAL CARE:  [ ] Shock Present  [ ]Septic [ ]Cardiogenic [ ]Neurologic [ ]Hypovolemic  [ ]  Vasopressors [ ]  Inotropes   [ ]Respiratory failure present [ ]Mechanical ventilation [ ]Non-invasive ventilatory support [ ]High flow  [ ]Acute  [ ]Chronic [ ]Hypoxic  [ ]Hypercarbic [ ]Other  [ ]Other organ failure     LABS: reviewed by me                          8.2    11.22 )-----------( 705      ( 14 Feb 2024 07:26 )             27.1     02-14    137  |  99  |  12  ----------------------------<  161<H>  4.8   |  29  |  0.5<L>    Ca    8.1<L>      14 Feb 2024 07:26  Mg     2.5     02-13          RADIOLOGY & ADDITIONAL STUDIES: reviewed by m    CXR 2/5/24    Support devices: Enteric tube satisfactory position.    Cardiac/ Mediastinum: unremarkable    Lungs/ Pleura: Diminishing left lung opacity/effusion. Stable smaller   right basilar opacity. No pneumothorax.    Skeletal/ soft tissues: Stable    PROTEIN CALORIE MALNUTRITION PRESENT: [ ]mild [ ]moderate [ ]severe [ ]underweight [ ]morbid obesity  https://www.andeal.org/vault/2440/web/files/ONC/Table_Clinical%20Characteristics%20to%20Document%20Malnutrition-White%20JV%20et%20al%202012.pdf    Height (cm): 136.4 (01-24-24 @ 09:52), 154.9 (11-17-23 @ 15:00), 145 (10-02-23 @ 12:00)  Weight (kg): 52.3 (01-21-24 @ 08:00), 60 (11-17-23 @ 15:00), 55.3 (10-02-23 @ 12:00)  BMI (kg/m2): 28.1 (01-24-24 @ 09:52), 21.8 (01-21-24 @ 08:00), 25 (11-17-23 @ 15:00)    [ ]PPSV2 < or = to 30% [ ]significant weight loss  [ ]poor nutritional intake  [ ]anasarca      [ ]Artificial Nutrition          Palliative Care Spiritual/Emotional Screening Tool Question  Severity (0-4):                    OR                    [X ] Unable to determine/NA  Score of 2 or greater indicates recommendation of Chaplaincy referral  Chaplaincy Referral: [ ] Yes [ ] Refused [ ] Following     Caregiver [place]:  [ ] Yes [ ] No    OR    [x ] Unable to determine. Will assess at later time if appropriate.  Social Work Referral [ ]  Patient and Family Centered Care Referral [ ]    Anticipatory Grief Present: [ ] Yes [ ] No    OR     [ x] Unable to determine. Will assess at later time if appropriate.  Social Work Referral [ ]  Patient and Family Centered Care Referral [ ]    REFERRALS:   [ ]Chaplaincy  [ ]Hospice  [ ]Child Life  [ ]Social Work  [ ]Case management [ ]Holistic Therapy     Palliative care education provided to patient and/or family    Goals of Care Document:     ______________  Wu Jenkins MD  Palliative Medicine  [facility]   of Geriatric and Palliative Medicine  [phone]

## 2024-02-15 LAB
ANION GAP SERPL CALC-SCNC: 9 MMOL/L — SIGNIFICANT CHANGE UP (ref 7–14)
BASOPHILS # BLD AUTO: 0.12 K/UL — SIGNIFICANT CHANGE UP (ref 0–0.2)
BASOPHILS NFR BLD AUTO: 1.2 % — HIGH (ref 0–1)
BUN SERPL-MCNC: 14 MG/DL — SIGNIFICANT CHANGE UP (ref 10–20)
CALCIUM SERPL-MCNC: 8.2 MG/DL — LOW (ref 8.4–10.4)
CHLORIDE SERPL-SCNC: 99 MMOL/L — SIGNIFICANT CHANGE UP (ref 98–110)
CO2 SERPL-SCNC: 31 MMOL/L — SIGNIFICANT CHANGE UP (ref 17–32)
CREAT SERPL-MCNC: 0.5 MG/DL — LOW (ref 0.7–1.5)
EGFR: 109 ML/MIN/1.73M2 — SIGNIFICANT CHANGE UP
EOSINOPHIL # BLD AUTO: 0.36 K/UL — SIGNIFICANT CHANGE UP (ref 0–0.7)
EOSINOPHIL NFR BLD AUTO: 3.5 % — SIGNIFICANT CHANGE UP (ref 0–8)
GLUCOSE SERPL-MCNC: 149 MG/DL — HIGH (ref 70–99)
HCT VFR BLD CALC: 28.1 % — LOW (ref 37–47)
HGB BLD-MCNC: 8.4 G/DL — LOW (ref 12–16)
IMM GRANULOCYTES NFR BLD AUTO: 0.3 % — SIGNIFICANT CHANGE UP (ref 0.1–0.3)
LYMPHOCYTES # BLD AUTO: 1.31 K/UL — SIGNIFICANT CHANGE UP (ref 1.2–3.4)
LYMPHOCYTES # BLD AUTO: 12.7 % — LOW (ref 20.5–51.1)
MAGNESIUM SERPL-MCNC: 2.4 MG/DL — SIGNIFICANT CHANGE UP (ref 1.8–2.4)
MCHC RBC-ENTMCNC: 23.7 PG — LOW (ref 27–31)
MCHC RBC-ENTMCNC: 29.9 G/DL — LOW (ref 32–37)
MCV RBC AUTO: 79.2 FL — LOW (ref 81–99)
MONOCYTES # BLD AUTO: 0.76 K/UL — HIGH (ref 0.1–0.6)
MONOCYTES NFR BLD AUTO: 7.4 % — SIGNIFICANT CHANGE UP (ref 1.7–9.3)
MRSA PCR RESULT.: NEGATIVE — SIGNIFICANT CHANGE UP
NEUTROPHILS # BLD AUTO: 7.72 K/UL — HIGH (ref 1.4–6.5)
NEUTROPHILS NFR BLD AUTO: 74.9 % — SIGNIFICANT CHANGE UP (ref 42.2–75.2)
NRBC # BLD: 0 /100 WBCS — SIGNIFICANT CHANGE UP (ref 0–0)
PLATELET # BLD AUTO: 698 K/UL — HIGH (ref 130–400)
PMV BLD: 10.6 FL — HIGH (ref 7.4–10.4)
POTASSIUM SERPL-MCNC: 5.1 MMOL/L — HIGH (ref 3.5–5)
POTASSIUM SERPL-SCNC: 5.1 MMOL/L — HIGH (ref 3.5–5)
RBC # BLD: 3.55 M/UL — LOW (ref 4.2–5.4)
RBC # FLD: 21.3 % — HIGH (ref 11.5–14.5)
SODIUM SERPL-SCNC: 139 MMOL/L — SIGNIFICANT CHANGE UP (ref 135–146)
WBC # BLD: 10.3 K/UL — SIGNIFICANT CHANGE UP (ref 4.8–10.8)
WBC # FLD AUTO: 10.3 K/UL — SIGNIFICANT CHANGE UP (ref 4.8–10.8)

## 2024-02-15 PROCEDURE — 99291 CRITICAL CARE FIRST HOUR: CPT

## 2024-02-15 PROCEDURE — 71045 X-RAY EXAM CHEST 1 VIEW: CPT | Mod: 26

## 2024-02-15 PROCEDURE — 99233 SBSQ HOSP IP/OBS HIGH 50: CPT

## 2024-02-15 RX ORDER — MEROPENEM 1 G/30ML
1000 INJECTION INTRAVENOUS EVERY 8 HOURS
Refills: 0 | Status: DISCONTINUED | OUTPATIENT
Start: 2024-02-16 | End: 2024-02-16

## 2024-02-15 RX ADMIN — MIDODRINE HYDROCHLORIDE 20 MILLIGRAM(S): 2.5 TABLET ORAL at 11:29

## 2024-02-15 RX ADMIN — MIDODRINE HYDROCHLORIDE 20 MILLIGRAM(S): 2.5 TABLET ORAL at 05:01

## 2024-02-15 RX ADMIN — Medication 3 MILLILITER(S): at 19:52

## 2024-02-15 RX ADMIN — Medication 1 APPLICATION(S): at 05:02

## 2024-02-15 RX ADMIN — MEROPENEM 100 MILLIGRAM(S): 1 INJECTION INTRAVENOUS at 14:58

## 2024-02-15 RX ADMIN — Medication 2 SPRAY(S): at 21:13

## 2024-02-15 RX ADMIN — Medication 1 DROP(S): at 17:55

## 2024-02-15 RX ADMIN — SODIUM CHLORIDE 4 MILLILITER(S): 9 INJECTION INTRAMUSCULAR; INTRAVENOUS; SUBCUTANEOUS at 11:04

## 2024-02-15 RX ADMIN — Medication 3 MILLILITER(S): at 15:23

## 2024-02-15 RX ADMIN — CHLORHEXIDINE GLUCONATE 1 APPLICATION(S): 213 SOLUTION TOPICAL at 11:27

## 2024-02-15 RX ADMIN — Medication 3 MILLIGRAM(S): at 21:13

## 2024-02-15 RX ADMIN — Medication 4 MILLILITER(S): at 15:24

## 2024-02-15 RX ADMIN — ENOXAPARIN SODIUM 50 MILLIGRAM(S): 100 INJECTION SUBCUTANEOUS at 05:00

## 2024-02-15 RX ADMIN — MEROPENEM 100 MILLIGRAM(S): 1 INJECTION INTRAVENOUS at 05:02

## 2024-02-15 RX ADMIN — Medication 1 DROP(S): at 05:04

## 2024-02-15 RX ADMIN — Medication 1 TABLET(S): at 17:56

## 2024-02-15 RX ADMIN — GABAPENTIN 300 MILLIGRAM(S): 400 CAPSULE ORAL at 05:01

## 2024-02-15 RX ADMIN — LEVETIRACETAM 500 MILLIGRAM(S): 250 TABLET, FILM COATED ORAL at 05:01

## 2024-02-15 RX ADMIN — GABAPENTIN 300 MILLIGRAM(S): 400 CAPSULE ORAL at 17:56

## 2024-02-15 RX ADMIN — MIDODRINE HYDROCHLORIDE 20 MILLIGRAM(S): 2.5 TABLET ORAL at 17:56

## 2024-02-15 RX ADMIN — ENOXAPARIN SODIUM 50 MILLIGRAM(S): 100 INJECTION SUBCUTANEOUS at 17:57

## 2024-02-15 RX ADMIN — Medication 4 MILLILITER(S): at 19:52

## 2024-02-15 RX ADMIN — Medication 2 SPRAY(S): at 05:02

## 2024-02-15 RX ADMIN — Medication 2 SPRAY(S): at 15:06

## 2024-02-15 RX ADMIN — CASPOFUNGIN ACETATE 260 MILLIGRAM(S): 7 INJECTION, POWDER, LYOPHILIZED, FOR SOLUTION INTRAVENOUS at 09:22

## 2024-02-15 RX ADMIN — LEVETIRACETAM 500 MILLIGRAM(S): 250 TABLET, FILM COATED ORAL at 17:56

## 2024-02-15 RX ADMIN — SODIUM CHLORIDE 4 MILLILITER(S): 9 INJECTION INTRAMUSCULAR; INTRAVENOUS; SUBCUTANEOUS at 19:53

## 2024-02-15 RX ADMIN — Medication 3 MILLILITER(S): at 11:09

## 2024-02-15 RX ADMIN — Medication 1 TABLET(S): at 05:01

## 2024-02-15 RX ADMIN — SODIUM CHLORIDE 4 MILLILITER(S): 9 INJECTION INTRAMUSCULAR; INTRAVENOUS; SUBCUTANEOUS at 15:23

## 2024-02-15 RX ADMIN — Medication 1 APPLICATION(S): at 17:55

## 2024-02-15 RX ADMIN — RALOXIFENE HYDROCHLORIDE 60 MILLIGRAM(S): 60 TABLET, COATED ORAL at 11:30

## 2024-02-15 RX ADMIN — Medication 250 MILLIGRAM(S): at 05:01

## 2024-02-15 RX ADMIN — Medication 4 MILLILITER(S): at 11:08

## 2024-02-15 RX ADMIN — PANTOPRAZOLE SODIUM 40 MILLIGRAM(S): 20 TABLET, DELAYED RELEASE ORAL at 05:01

## 2024-02-15 NOTE — PROGRESS NOTE ADULT - ASSESSMENT
58F w/ PMHx Down Syndrome, nonverbal at baseline, Hypothyroidism, Cerebral palsy and Seizure Disorders presents to the ED from nursing facility/group home ([facility]) presented to ED for respiratory distress and high grade fever. Patient found to be septic on admission.Admitted to SDU for management of acute respiratory distress 2/2 CAP/Aspiration PNA (20 Jan 2024 18:22)  While pt was on the floor she developed dyspnea after swallow evaluation, was spiking high grade fever, consulted by pulmonary/critical care and was upgraded back to SDU.    A/P  # Sepsis POA / Acute hypoxic resp failure / RSV bronchiolitis /  H/o Dysphagia/ suspected aspiration PNA   - CXR noted,   likely resolving mucous plug  - aggressive chest PT with vest, aspiration precautions and suction   - ID is following, recommendations noted:  - c/w  meropenem, last day today  2/15 to complete 14 days , monitor closely as continued significant opacities on CXR and recent fevers ( pt has a low grade fever in last 24 hours)   - Fungitell started downtrending 1 day after Caspo started  - Continue caspofungin 50mg q24h IV , will likely need empiric 14 day course. 2/18  - pt has no risk factors for PCP or other invasive fungal infection   - CT Chest, AP when stable   - MRSA negative   - BCx (1/20): Staph hominis -  contaminant repeat BCx have been NGTD  - Failed FEES, put  NG tube for feedings and medications, pt'll likely need PEG, consulted by GI   - Aspiration precautions, order chest PT vest   - Scopolamine patch d/c on 2/14  - supplement oxygen, monitor pulse Ox   - c/w duoneb  - pulmonary is following, recommendations noted:   - Wean FiO2 Keep spo2 more than 92%.  Daily CXR. Aggressive pulm toilet, frequent suctioning.  Might need MV.    # Hypotension  - c/w   Midodrine  20 mg Q 8 hours   - taper  off pressure support  as tolerated   - keep MAP above 60     # Elevated Troponin , type 2 MI/  Sinus tachycardia  - c/w telemetry monitoring   - Repeat EKG: Normal sinus rhythm  - c/w  metoprolol  -  maintain fluid balance     # Seizure Disorder  - c/w  Keppra   - seizure precautions  - keep Mg above 2.0     # H/o lower ext DVT  - therapeutic Lovenox recommended by ICU team  - Eliquis held     # Hypothyroidism  - Thyroid Stimulating Hormone, Serum: 0.51 uIU/mL (01.21.24 @ 06:34)  - check FT4     # Normocytic Anemia   - monitor H/H, keep Hb above 7.5     # Down Syndrome/  Cerebral Palsy  - supportive care  - prevent falls and aspiration   - failed speech and swallow, needs PEG   - on Raloxifene     # Full code    # GI prophylaxis       Pending (specify):  pulmonary toilet, chest PT vest,  frequent suction,  c/w  Caspofungin, today is the last day of  Meropenem,  keep MAP above 60, taper off pressure support, c/w  Therapeutic Lovenox , monitor WBC and fever curve,  supportive care, pt needs PEG, daily CXR   overall prognosis is very poor      58F w/ PMHx Down Syndrome, nonverbal at baseline, Hypothyroidism, Cerebral palsy and Seizure Disorders presents to the ED from nursing facility/group home ([facility]) presented to ED for respiratory distress and high grade fever. Patient found to be septic on admission.Admitted to SDU for management of acute respiratory distress 2/2 CAP/Aspiration PNA (20 Jan 2024 18:22)  While pt was on the floor she developed dyspnea after swallow evaluation, was spiking high grade fever, consulted by pulmonary/critical care and was upgraded back to SDU.    A/P  # Sepsis POA / Acute hypoxic resp failure / RSV bronchiolitis /  H/o Dysphagia/ suspected aspiration PNA   - CXR noted,   likely resolving mucous plug  - aggressive chest PT with vest, aspiration precautions and suction   - ID is following, recommendations noted:  - c/w  meropenem, last day today  2/15 to complete 14 days , monitor closely as continued significant opacities on CXR and recent fevers ( pt has a low grade fever in last 24 hours)   - ID followed up, will add Levofloxacin 500 mg Q 24 hours   - Fungitell started downtrending 1 day after Caspo started  - Continue caspofungin 50mg q24h IV , will likely need empiric 14 day course. 2/18  - pt has no risk factors for PCP or other invasive fungal infection   - CT Chest, AP when stable   - MRSA negative   - BCx (1/20): Staph hominis -  contaminant repeat BCx have been NGTD  - Failed FEES, put  NG tube for feedings and medications, pt'll likely need PEG, consulted by GI   - Aspiration precautions, order chest PT vest   - Scopolamine patch d/c on 2/14  - supplement oxygen, monitor pulse Ox   - c/w duoneb  - pulmonary is following, recommendations noted:   - Wean FiO2 Keep spo2 more than 92%.  Daily CXR. Aggressive pulm toilet, frequent suctioning.  Might need MV.    # Hypotension  - c/w   Midodrine  20 mg Q 8 hours   - taper  off pressure support  as tolerated   - keep MAP above 60     # Elevated Troponin , type 2 MI/  Sinus tachycardia  - c/w telemetry monitoring   - Repeat EKG: Normal sinus rhythm  - c/w  metoprolol  -  maintain fluid balance     # Seizure Disorder  - c/w  Keppra   - seizure precautions  - keep Mg above 2.0     # H/o lower ext DVT  - therapeutic Lovenox recommended by ICU team  - Eliquis held     # Hypothyroidism  - Thyroid Stimulating Hormone, Serum: 0.51 uIU/mL (01.21.24 @ 06:34)  - check FT4     # Normocytic Anemia   - monitor H/H, keep Hb above 7.5     # Down Syndrome/  Cerebral Palsy  - supportive care  - prevent falls and aspiration   - failed speech and swallow, needs PEG   - on Raloxifene     # Full code    # GI prophylaxis       Pending (specify):  pulmonary toilet, chest PT vest,  frequent suction,  c/w  Caspofungin, today is the last day of  Meropenem,  keep MAP above 60, taper off pressure support, c/w  Therapeutic Lovenox , monitor WBC and fever curve, add Levofloxacin, f/u official ID recommendations,  supportive care, pt needs PEG, daily CXR   overall prognosis is very poor

## 2024-02-15 NOTE — PROGRESS NOTE ADULT - SUBJECTIVE AND OBJECTIVE BOX
Patient is a 58y old  Female who presents with a chief complaint of Respiratory Distress (14 Feb 2024 15:40)      Over Night Events: febrile overnight 100.5, still on pressors      ROS:     All ROS are negative except HPI       PHYSICAL EXAM    ICU Vital Signs Last 24 Hrs  T(C): 36.3 (15 Feb 2024 07:40), Max: 38.1 (14 Feb 2024 16:00)  T(F): 97.4 (15 Feb 2024 07:40), Max: 100.5 (14 Feb 2024 16:00)  HR: 111 (15 Feb 2024 07:40) (69 - 111)  BP: 97/54 (15 Feb 2024 07:40) (97/54 - 112/65)  BP(mean): 70 (15 Feb 2024 07:40) (70 - 83)  ABP: --  ABP(mean): --  RR: 20 (15 Feb 2024 07:40) (18 - 20)  SpO2: 97% (15 Feb 2024 07:40) (91% - 99%)    O2 Parameters below as of 15 Feb 2024 07:40  Patient On (Oxygen Delivery Method): nasal cannula, high flow        CONSTITUTIONAL:  Ill appearing     CARDIAC:   Normal rate,   Regular rhythm.    No edema    RESPIRATORY:   No wheezing  Bilateral BS  Decreased BS noted    GASTROINTESTINAL:  Abdomen soft,   Non-tender,   No guarding,     MUSCULOSKELETAL:   Range of motion is not limited,  No clubbing, cyanosis    NEUROLOGICAL:   More awake, alert    SKIN:   Heel and sacrum friction wound    02-14-24 @ 07:01  -  02-15-24 @ 07:00  --------------------------------------------------------  IN:    Enteral Tube Flush: 250 mL    Enteral Tube Flush: 700 mL    IV PiggyBack: 300 mL    Jevity 1.2: 1200 mL    Lactated Ringers Bolus: 250 mL    Norepinephrine: 44 mL    Norepinephrine: 149.6 mL  Total IN: 2893.6 mL    OUT:    Voided (mL): 450 mL  Total OUT: 450 mL    Total NET: 2443.6 mL        LABS:                            8.4    10.30 )-----------( 698      ( 15 Feb 2024 04:44 )             28.1                                               02-15    139  |  99  |  14  ----------------------------<  149<H>  5.1<H>   |  31  |  0.5<L>    Ca    8.2<L>      15 Feb 2024 04:44  Mg     2.4     02-15                                               Urinalysis Basic - ( 15 Feb 2024 04:44 )    Color: x / Appearance: x / SG: x / pH: x  Gluc: 149 mg/dL / Ketone: x  / Bili: x / Urobili: x   Blood: x / Protein: x / Nitrite: x   Leuk Esterase: x / RBC: x / WBC x   Sq Epi: x / Non Sq Epi: x / Bacteria: x                                                                                           Culture - Blood (collected 12 Feb 2024 11:17)  Source: .Blood None  Preliminary Report (14 Feb 2024 21:01):    No growth at 48 Hours                                                                                           MEDICATIONS  (STANDING):  acetylcysteine 20%  Inhalation 4 milliLiter(s) Inhalation every 4 hours  albuterol/ipratropium for Nebulization 3 milliLiter(s) Nebulizer every 4 hours  artificial  tears Solution 1 Drop(s) Both EYES two times a day  calcium carbonate   1250 mG (OsCal) 1 Tablet(s) Oral two times a day  caspofungin IVPB      caspofungin IVPB 50 milliGRAM(s) IV Intermittent every 24 hours  chlorhexidine 2% Cloths 1 Application(s) Topical daily  enoxaparin Injectable 50 milliGRAM(s) SubCutaneous every 12 hours  gabapentin 300 milliGRAM(s) Oral every 12 hours  levETIRAcetam  Solution 500 milliGRAM(s) Oral every 12 hours  melatonin 3 milliGRAM(s) Oral at bedtime  meropenem  IVPB 1000 milliGRAM(s) IV Intermittent every 8 hours  midodrine. 20 milliGRAM(s) Oral three times a day  norepinephrine Infusion 0.05 MICROgram(s)/kG/Min (4.9 mL/Hr) IV Continuous <Continuous>  pantoprazole  Injectable 40 milliGRAM(s) IV Push every 24 hours  polyethylene glycol 3350 17 Gram(s) Oral at bedtime  raloxifene 60 milliGRAM(s) Oral daily  saccharomyces boulardii 250 milliGRAM(s) Oral two times a day  senna 2 Tablet(s) Oral at bedtime  silver sulfADIAZINE 1% Cream 1 Application(s) Topical two times a day  sodium chloride 0.65% Nasal 2 Spray(s) Both Nostrils three times a day  sodium chloride 3%  Inhalation 4 milliLiter(s) Inhalation every 4 hours    MEDICATIONS  (PRN):  acetaminophen     Tablet .. 650 milliGRAM(s) Oral every 6 hours PRN Temp greater or equal to 38C (100.4F), Mild Pain (1 - 3)  aluminum hydroxide/magnesium hydroxide/simethicone Suspension 30 milliLiter(s) Oral every 4 hours PRN Dyspepsia  ondansetron Injectable 4 milliGRAM(s) IV Push every 8 hours PRN Nausea and/or Vomiting      New X-rays reviewed:                                                                                  ECHO    CXR interpreted by me:       Patient is a 58y old  Female who presents with a chief complaint of Respiratory Distress (14 Feb 2024 15:40)      Over Night Events: febrile overnight 100.5, still on pressors      ROS:     All ROS are negative except HPI       PHYSICAL EXAM    ICU Vital Signs Last 24 Hrs  T(C): 36.3 (15 Feb 2024 07:40), Max: 38.1 (14 Feb 2024 16:00)  T(F): 97.4 (15 Feb 2024 07:40), Max: 100.5 (14 Feb 2024 16:00)  HR: 111 (15 Feb 2024 07:40) (69 - 111)  BP: 97/54 (15 Feb 2024 07:40) (97/54 - 112/65)  BP(mean): 70 (15 Feb 2024 07:40) (70 - 83)  ABP: --  ABP(mean): --  RR: 20 (15 Feb 2024 07:40) (18 - 20)  SpO2: 97% (15 Feb 2024 07:40) (91% - 99%)    O2 Parameters below as of 15 Feb 2024 07:40  Patient On (Oxygen Delivery Method): nasal cannula, high flow        CONSTITUTIONAL:  Ill appearing     CARDIAC:   Normal rate,   Regular rhythm.    No edema    RESPIRATORY:   No wheezing  Bilateral BS  Decreased BS noted    GASTROINTESTINAL:  Abdomen soft,   Non-tender,   No guarding,     MUSCULOSKELETAL:   Range of motion is not limited,  No clubbing, cyanosis    NEUROLOGICAL:   More awake, alert    SKIN:   Heel and sacrum friction wound    02-14-24 @ 07:01  -  02-15-24 @ 07:00  --------------------------------------------------------  IN:    Enteral Tube Flush: 250 mL    Enteral Tube Flush: 700 mL    IV PiggyBack: 300 mL    Jevity 1.2: 1200 mL    Lactated Ringers Bolus: 250 mL    Norepinephrine: 44 mL    Norepinephrine: 149.6 mL  Total IN: 2893.6 mL    OUT:    Voided (mL): 450 mL  Total OUT: 450 mL    Total NET: 2443.6 mL        LABS:                            8.4    10.30 )-----------( 698      ( 15 Feb 2024 04:44 )             28.1                                               02-15    139  |  99  |  14  ----------------------------<  149<H>  5.1<H>   |  31  |  0.5<L>    Ca    8.2<L>      15 Feb 2024 04:44  Mg     2.4     02-15                                               Urinalysis Basic - ( 15 Feb 2024 04:44 )    Color: x / Appearance: x / SG: x / pH: x  Gluc: 149 mg/dL / Ketone: x  / Bili: x / Urobili: x   Blood: x / Protein: x / Nitrite: x   Leuk Esterase: x / RBC: x / WBC x   Sq Epi: x / Non Sq Epi: x / Bacteria: x                                                                                           Culture - Blood (collected 12 Feb 2024 11:17)  Source: .Blood None  Preliminary Report (14 Feb 2024 21:01):    No growth at 48 Hours                                                                                           MEDICATIONS  (STANDING):  acetylcysteine 20%  Inhalation 4 milliLiter(s) Inhalation every 4 hours  albuterol/ipratropium for Nebulization 3 milliLiter(s) Nebulizer every 4 hours  artificial  tears Solution 1 Drop(s) Both EYES two times a day  calcium carbonate   1250 mG (OsCal) 1 Tablet(s) Oral two times a day  caspofungin IVPB      caspofungin IVPB 50 milliGRAM(s) IV Intermittent every 24 hours  chlorhexidine 2% Cloths 1 Application(s) Topical daily  enoxaparin Injectable 50 milliGRAM(s) SubCutaneous every 12 hours  gabapentin 300 milliGRAM(s) Oral every 12 hours  levETIRAcetam  Solution 500 milliGRAM(s) Oral every 12 hours  melatonin 3 milliGRAM(s) Oral at bedtime  meropenem  IVPB 1000 milliGRAM(s) IV Intermittent every 8 hours  midodrine. 20 milliGRAM(s) Oral three times a day  norepinephrine Infusion 0.05 MICROgram(s)/kG/Min (4.9 mL/Hr) IV Continuous <Continuous>  pantoprazole  Injectable 40 milliGRAM(s) IV Push every 24 hours  polyethylene glycol 3350 17 Gram(s) Oral at bedtime  raloxifene 60 milliGRAM(s) Oral daily  saccharomyces boulardii 250 milliGRAM(s) Oral two times a day  senna 2 Tablet(s) Oral at bedtime  silver sulfADIAZINE 1% Cream 1 Application(s) Topical two times a day  sodium chloride 0.65% Nasal 2 Spray(s) Both Nostrils three times a day  sodium chloride 3%  Inhalation 4 milliLiter(s) Inhalation every 4 hours    MEDICATIONS  (PRN):  acetaminophen     Tablet .. 650 milliGRAM(s) Oral every 6 hours PRN Temp greater or equal to 38C (100.4F), Mild Pain (1 - 3)  aluminum hydroxide/magnesium hydroxide/simethicone Suspension 30 milliLiter(s) Oral every 4 hours PRN Dyspepsia  ondansetron Injectable 4 milliGRAM(s) IV Push every 8 hours PRN Nausea and/or Vomiting      New X-rays reviewed:                                                                                  ECHO Patient is a 58y old  Female who presents with a chief complaint of Respiratory Distress (14 Feb 2024 15:40)      Over Night Events: Remains critically ill ON HFNCO2.  On Levophed      ROS:     All ROS are negative except HPI       PHYSICAL EXAM    ICU Vital Signs Last 24 Hrs  T(C): 36.3 (15 Feb 2024 07:40), Max: 38.1 (14 Feb 2024 16:00)  T(F): 97.4 (15 Feb 2024 07:40), Max: 100.5 (14 Feb 2024 16:00)  HR: 111 (15 Feb 2024 07:40) (69 - 111)  BP: 97/54 (15 Feb 2024 07:40) (97/54 - 112/65)  BP(mean): 70 (15 Feb 2024 07:40) (70 - 83)  ABP: --  ABP(mean): --  RR: 20 (15 Feb 2024 07:40) (18 - 20)  SpO2: 97% (15 Feb 2024 07:40) (91% - 99%)    O2 Parameters below as of 15 Feb 2024 07:40  Patient On (Oxygen Delivery Method): nasal cannula, high flow        CONSTITUTIONAL:  Ill appearing     CARDIAC:   Normal rate,   Regular rhythm.    No edema    RESPIRATORY:   No wheezing  Bilateral BS  Decreased BS noted    GASTROINTESTINAL:  Abdomen soft,   Non-tender,   No guarding,     MUSCULOSKELETAL:   Range of motion is not limited,  No clubbing, cyanosis    NEUROLOGICAL:   More awake, alert    SKIN:   Heel and sacrum friction wound    02-14-24 @ 07:01  -  02-15-24 @ 07:00  --------------------------------------------------------  IN:    Enteral Tube Flush: 250 mL    Enteral Tube Flush: 700 mL    IV PiggyBack: 300 mL    Jevity 1.2: 1200 mL    Lactated Ringers Bolus: 250 mL    Norepinephrine: 44 mL    Norepinephrine: 149.6 mL  Total IN: 2893.6 mL    OUT:    Voided (mL): 450 mL  Total OUT: 450 mL    Total NET: 2443.6 mL        LABS:                            8.4    10.30 )-----------( 698      ( 15 Feb 2024 04:44 )             28.1                                               02-15    139  |  99  |  14  ----------------------------<  149<H>  5.1<H>   |  31  |  0.5<L>    Ca    8.2<L>      15 Feb 2024 04:44  Mg     2.4     02-15                                               Urinalysis Basic - ( 15 Feb 2024 04:44 )    Color: x / Appearance: x / SG: x / pH: x  Gluc: 149 mg/dL / Ketone: x  / Bili: x / Urobili: x   Blood: x / Protein: x / Nitrite: x   Leuk Esterase: x / RBC: x / WBC x   Sq Epi: x / Non Sq Epi: x / Bacteria: x                                                                                           Culture - Blood (collected 12 Feb 2024 11:17)  Source: .Blood None  Preliminary Report (14 Feb 2024 21:01):    No growth at 48 Hours                                                                                           MEDICATIONS  (STANDING):  acetylcysteine 20%  Inhalation 4 milliLiter(s) Inhalation every 4 hours  albuterol/ipratropium for Nebulization 3 milliLiter(s) Nebulizer every 4 hours  artificial  tears Solution 1 Drop(s) Both EYES two times a day  calcium carbonate   1250 mG (OsCal) 1 Tablet(s) Oral two times a day  caspofungin IVPB      caspofungin IVPB 50 milliGRAM(s) IV Intermittent every 24 hours  chlorhexidine 2% Cloths 1 Application(s) Topical daily  enoxaparin Injectable 50 milliGRAM(s) SubCutaneous every 12 hours  gabapentin 300 milliGRAM(s) Oral every 12 hours  levETIRAcetam  Solution 500 milliGRAM(s) Oral every 12 hours  melatonin 3 milliGRAM(s) Oral at bedtime  meropenem  IVPB 1000 milliGRAM(s) IV Intermittent every 8 hours  midodrine. 20 milliGRAM(s) Oral three times a day  norepinephrine Infusion 0.05 MICROgram(s)/kG/Min (4.9 mL/Hr) IV Continuous <Continuous>  pantoprazole  Injectable 40 milliGRAM(s) IV Push every 24 hours  polyethylene glycol 3350 17 Gram(s) Oral at bedtime  raloxifene 60 milliGRAM(s) Oral daily  saccharomyces boulardii 250 milliGRAM(s) Oral two times a day  senna 2 Tablet(s) Oral at bedtime  silver sulfADIAZINE 1% Cream 1 Application(s) Topical two times a day  sodium chloride 0.65% Nasal 2 Spray(s) Both Nostrils three times a day  sodium chloride 3%  Inhalation 4 milliLiter(s) Inhalation every 4 hours    MEDICATIONS  (PRN):  acetaminophen     Tablet .. 650 milliGRAM(s) Oral every 6 hours PRN Temp greater or equal to 38C (100.4F), Mild Pain (1 - 3)  aluminum hydroxide/magnesium hydroxide/simethicone Suspension 30 milliLiter(s) Oral every 4 hours PRN Dyspepsia  ondansetron Injectable 4 milliGRAM(s) IV Push every 8 hours PRN Nausea and/or Vomiting      New X-rays reviewed:                                                                                  ECHO

## 2024-02-15 NOTE — PROGRESS NOTE ADULT - ASSESSMENT
ASSESSMENT  58F w/ PMHx Down Syndrome, nonverbal at baseline, Hypothyroidism, Cerebral palsy and Seizure Disorders presents to the ED from nursing facility/group home presented to ED for respiratory distress and high grade fever.     IMPRESSION  #Fevers, resolving , HAP / aspiration   Possible RUE phlebitis   Unclear source , fever curve and WBC downtrending with caspofungin, unclear source    2/12 BCX NGTD    Procalcitonin, Serum: 0.10 (02-12-24 @ 11:17)    Rapid RVP Result: NotDetec (02-12-24 @ 10:28)    MRSA PCR Result.: Negative (02-12-24 @ 10:28)    2/9 BCX NGTD x2    2/3 BCX NGTD     2/1 BCX NGTD     2/1 UCX   >=3 organisms. Probable collection contamination.    1/31 BCX NG    Rapid RVP Result: NotDetec (02-04-24 @ 10:28)    MRSA PCR Result.: Negative (02-03-24 @ 21:32)     UA without significant pyuria   Procalcitonin, Serum: 0.22 (02-01-24 @ 16:00)--> Procalcitonin, Serum: 0.15 (02-03-24 @ 11:13), downtrending ; unremarkable   MRSA PCR Result.: Negative (01-22-24 @ 05:30)  < from: Xray Chest 1 View-PORTABLE IMMEDIATE (Xray Chest 1 View-PORTABLE IMMEDIATE .) (02.01.24 @ 03:02) >  Bibasal opacities without significant change.   #Acute hypoxic respiratory failure- Gram Negative pneumonia   #1/20 BCX 1/4 bottles : Staphylococcus hominis- contaminant   Repeat CX NG   #Severe Sepsis on admission  #RSV + 1/21  #Elevated fungitell   Fungitell: 63 (02-06-24 @ 11:27)  Fungitell: 325 (01-20-24 @ 21:23)  Fungitell: 138 (11-07-23 @ 08:08)  Fungitell: 115 (11-02-23 @ 11:40)  Fungitell: >500 pg/mL (10.17.23 @ 17:20) s/p empiric caspo   #Full thickness ulcer right heel- Appreciate burn/podiatry evaluation.  #History of Right planter foot ulcers - two full thickness ulcers - serous drainage with mild erythema with OM  - MR Foot No Cont, Right (10.16.23 @ 21:51): IMPRESSION: 1.  Limited exam. 2.  Osteomyelitis of the first metatarsal stump. 3.  Osteomyelitis of the second toe distal phalanx.  - s/p excidional debridement to and including bone 1st metatarsal with partial 2nd digit amputation - 1st metatarsal head resected - Wound Cx Proteus ESBL   #CKD 2-3 Creatinine: 0.7 mg/dL (02.02.24 @ 04:59)      #History of buttock ulcer  #Down syndrome/Cerebral Palsy     RECOMMENDATIONS  - CXR white out L lung, improving   - Continued low grade fevers, add PO levaquin 500mg daily   - Continue meropenem, continued CXR opacities and hypoxemia  - Fungitell started downtrending 1 day after Caspo started, doubt related, but at this point given critical illness would plan on completing a course  - Continue caspofungin 50mg q24h IV , hx unclear elevated fungitell. No clear source, will likely need empiric 14 day course. 2/18. No risk factors for PCP or other invasive fungal infection   - CT Chest, AP when stable   - Grave prognosis, GOC , aggressive care is futile    If any questions, please send a message or call on [employer]  Please continue to update ID with any pertinent new laboratory or radiographic findings.

## 2024-02-15 NOTE — PROGRESS NOTE ADULT - ASSESSMENT
IMPRESSION:    Acute hypoxemic respiratory failure  Likely aspiration pneumonia   Sepsis POA  Septic shock  on Levophed   Recurrent aspiration pneumonia / prior intubation  HO DVT on Eliquis   HO GI bleed  HO OM  HO recent duodenal perforation   HO polymicrobial bacteremia   H/o CP, DS  H/o seizures    PLAN:    CNS:  Avoid CNS Depressant, AED. MS at baseline.     HEENT: Oral care.     PULMONARY: HOB at 45 degrees.  Aspiration precaution. Wean FiO2 Keep spo2 more than 92%.  Daily CXR. Aggressive pulm toilet, frequent suctioning.  Might need MV.    CARDIOVASCULAR: Keep MAP more than 60. Avoid overload. Wean levophed as able. C/w midodrine 20mg.  Goal directed fluid resuscitation.    GI: Protonix. NG  feeding. Speech and swallow recs noted. Might need PEG when more stable    INFECTIOUS DISEASE:   c/w Meropenem.  Finish empiric Caspo course 2/18. F/u blood Cx, serum galactomannan. Histo Ag negative, ID follow up    HEMATOLOGICAL:  Lovenox therapeutic.  Monitor CBC     ENDOCRINE:  Follow up FS.  Insulin protocol if needed.    MUSCULOSKELETAL: Bedrest.  Off loading.  Wound care.      Prognosis very poor.    Palliative care following    SDU.

## 2024-02-15 NOTE — PROGRESS NOTE ADULT - SUBJECTIVE AND OBJECTIVE BOX
58F w/ PMHx Down Syndrome, nonverbal at baseline, Hypothyroidism, Cerebral palsy and Seizure Disorders presents to the ED from nursing facility/group home ([facility]) presented to ED for respiratory distress and high grade fever. Patient found to be septic on admission.Admitted to SDU for management of acute respiratory distress 2/2 CAP/Aspiration PNA (20 Jan 2024 18:22)  While pt was on the floor she developed dyspnea after swallow evaluation, was spiking high grade fever, consulted by pulmonary/critical care and was upgraded back to SDU.  Pt developed left lung white out, improved after aggressive chest PT, treated with Meropenem and caspofungin, ID is following, her overall prognosis is very poor, she might need trach and PEG in the nearest future , a meeting with facility staff and state took place on 2/14.   Today pt is awake, looks comfortable, aid at the bedside.       PAST MEDICAL & SURGICAL HISTORY:  Down syndrome  Osteoporosis  Mild anemia  Neuropathy  S/P debridement of R hip on 3/2/21    Allergies  No Known Allergies    Vital Signs Last 24 Hrs  T(C): 36.3 (15 Feb 2024 07:40), Max: 38.1 (14 Feb 2024 16:00)  T(F): 97.4 (15 Feb 2024 07:40), Max: 100.5 (14 Feb 2024 16:00)  HR: 111 (15 Feb 2024 07:40) (69 - 111)  BP: 97/54 (15 Feb 2024 07:40) (97/54 - 112/65)  BP(mean): 70 (15 Feb 2024 07:40) (70 - 83)  RR: 20 (15 Feb 2024 07:40) (18 - 20)  SpO2: 97% (15 Feb 2024 07:40) (91% - 99%)    Parameters below as of 15 Feb 2024 07:40  Patient On (Oxygen Delivery Method): nasal cannula, high flow          PHYSICAL EXAM:   GENERAL: NAD, nonverbal, awake   HEENT: atraumatic, EOMI.  Lungs: coarse BS   CVS: SIS2 +,  no murmur.  Abdomen/ GI:  Soft,  NT, ND, BS+  CNS: awake , non verbal, anxious   Ext: contracted  Skin: no rash, no ulcers.    LABs:                           8.4    10.30 )-----------( 698      ( 15 Feb 2024 04:44 )             28.1   02-15    139  |  99  |  14  ----------------------------<  149<H>  5.1<H>   |  31  |  0.5<L>    Ca    8.2<L>      15 Feb 2024 04:44  Mg     2.4     02-15        Parameters below as of 04 Feb 2024 08:47  Patient On (Oxygen Delivery Method): nasal cannula, high flow  O2 Flow (L/min): 60  O2 Concentration (%): 100  Urinalysis Basic - ( 01 Feb 2024 11:40 )    Color: Yellow / Appearance: Turbid / SG: >1.030 / pH: x  Gluc: x / Ketone: Negative mg/dL  / Bili: Negative / Urobili: 1.0 mg/dL   Blood: x / Protein: 100 mg/dL / Nitrite: Negative   Leuk Esterase: Negative / RBC: 10 /HPF / WBC 5 /HPF   Sq Epi: x / Non Sq Epi: 2 /HPF / Bacteria: Negative /HPF    Culture - Blood (01.31.24 @ 18:21)   Specimen Source: .Blood None  Culture Results:   No growth at 24 hoursCulture - Urine (01.23.24 @ 05:20)   Specimen Source: Clean Catch Clean Catch (Midstream)  Culture Results:   No growthCulture - Blood (01.20.24 @ 16:15)   - Coagulase negative Staphylococcus: Detec  Gram Stain:   Growth in aerobic bottle: Gram positive cocci in pairs  Specimen Source: .Blood Blood-Peripheral  Organism: Blood Culture PCR  Culture Results:   Growth in aerobic bottle: Staphylococcus hominis   Isolation of Coagulase negative Staphylococcus from single blood culture     sets may represent   contamination. Contact the Microbiology Department at [phone] if   susceptibility testing is   clinically indicated.   Direct identification is available within approximately 3-5   hours either by Blood Panel Multiplexed PCR or Direct   MALDI-TOF. Details: [email or web address]  Organism Identification: Blood Culture PCR  Method Type: PCR    Culture - Blood (02.12.24 @ 11:17)   Specimen Source: .Blood None  Culture Results:   No growth at 24 hours      RADIOLOGY:     < from: Xray Chest 1 View-PORTABLE IMMEDIATE (Xray Chest 1 View-PORTABLE IMMEDIATE .) (02.14.24 @ 08:11) >  IMPRESSION:    1. Enteric tube seen to the level of the stomach.  2. Bilateral opacities, worsened on the left    < end of copied text >  < from: [facility] Duplex Lower Ext Vein Scan, Bilat (02.11.24 @ 13:47) >    IMPRESSION:  No evidence of deep venous thrombosis in either lower extremity.    < end of copied text >      < from: VA Duplex Lower Ext Vein Scan, Bilat (01.22.24 @ 14:52) >  IMPRESSION:  No evidence of deep venous thrombosis in either lower extremity.    < end of copied text >  < from: TTE Echo Complete w/ Contrast w/o Doppler (01.22.24 @ 13:35) >  Summary:   1. Technically difficult and limited study.   2. Endocardial visualization was enhanced with intravenous echo contrast.   3. Left ventricular ejection fraction, by visual estimation, is >55%.   4. Normal global left ventricular systolic function.   5. The left ventricular diastolic function could not be assessed in this   study.  < from: Xray Chest 1 View- PORTABLE-Routine (Xray Chest 1 View- PORTABLE-Routine in AM.) (02.03.24 @ 06:48) >    Impression:    Stable bilateral opacities        MEDICATIONS  (STANDING):  acetylcysteine 20%  Inhalation 4 milliLiter(s) Inhalation every 4 hours  albuterol/ipratropium for Nebulization 3 milliLiter(s) Nebulizer every 4 hours  artificial  tears Solution 1 Drop(s) Both EYES two times a day  calcium carbonate   1250 mG (OsCal) 1 Tablet(s) Oral two times a day  caspofungin IVPB      caspofungin IVPB 50 milliGRAM(s) IV Intermittent every 24 hours  chlorhexidine 2% Cloths 1 Application(s) Topical daily  enoxaparin Injectable 50 milliGRAM(s) SubCutaneous every 12 hours  gabapentin 300 milliGRAM(s) Oral every 12 hours  levETIRAcetam  Solution 500 milliGRAM(s) Oral every 12 hours  melatonin 3 milliGRAM(s) Oral at bedtime  meropenem  IVPB 1000 milliGRAM(s) IV Intermittent every 8 hours  midodrine. 20 milliGRAM(s) Oral three times a day  norepinephrine Infusion 0.05 MICROgram(s)/kG/Min (4.9 mL/Hr) IV Continuous <Continuous>  pantoprazole  Injectable 40 milliGRAM(s) IV Push every 24 hours  polyethylene glycol 3350 17 Gram(s) Oral at bedtime  raloxifene 60 milliGRAM(s) Oral daily  saccharomyces boulardii 250 milliGRAM(s) Oral two times a day  senna 2 Tablet(s) Oral at bedtime  silver sulfADIAZINE 1% Cream 1 Application(s) Topical two times a day  sodium chloride 0.65% Nasal 2 Spray(s) Both Nostrils three times a day  sodium chloride 3%  Inhalation 4 milliLiter(s) Inhalation every 4 hours    MEDICATIONS  (PRN):  acetaminophen     Tablet .. 650 milliGRAM(s) Oral every 6 hours PRN Temp greater or equal to 38C (100.4F), Mild Pain (1 - 3)  aluminum hydroxide/magnesium hydroxide/simethicone Suspension 30 milliLiter(s) Oral every 4 hours PRN Dyspepsia  ondansetron Injectable 4 milliGRAM(s) IV Push every 8 hours PRN Nausea and/or Vomiting

## 2024-02-15 NOTE — PROGRESS NOTE ADULT - SUBJECTIVE AND OBJECTIVE BOX
JERICHO TEA  58y, Female  Allergy: No Known Allergies      LOS  26d    CHIEF COMPLAINT: Respiratory Distress (15 Feb 2024 10:05)      INTERVAL EVENTS/HPI  - No acute events overnight  - T(F): , Max: 99.6 (02-15-24 @ 00:00)  - Tolerating medication  - WBC Count: 10.30 (02-15-24 @ 04:44)  WBC Count: 11.22 (02-14-24 @ 07:26)     - Creatinine: 0.5 (02-15-24 @ 04:44)  Creatinine: 0.5 (02-14-24 @ 07:26)       ROS  unable to obtain history secondary to patient's mental status and/or sedation     VITALS:  T(F): 97.4, Max: 99.6 (02-15-24 @ 00:00)  HR: 93  BP: 97/53  RR: 20Vital Signs Last 24 Hrs  T(C): 36.3 (15 Feb 2024 07:40), Max: 37.6 (15 Feb 2024 00:00)  T(F): 97.4 (15 Feb 2024 07:40), Max: 99.6 (15 Feb 2024 00:00)  HR: 93 (15 Feb 2024 11:15) (80 - 111)  BP: 97/53 (15 Feb 2024 11:15) (92/51 - 112/65)  BP(mean): 68 (15 Feb 2024 11:15) (66 - 83)  RR: 20 (15 Feb 2024 11:15) (18 - 20)  SpO2: 97% (15 Feb 2024 11:15) (91% - 99%)    Parameters below as of 15 Feb 2024 11:15  Patient On (Oxygen Delivery Method): nasal cannula, high flow        PHYSICAL EXAM:  Gen: chronically ill appearing   HEENT: Normocephalic, atraumatic  Neck: supple, no lymphadenopathy  CV: Regular rate & regular rhythm  Lungs: decreased BS at bases, no fremitus  Abdomen: Soft, BS present  Ext: Warm, well perfused  Neuro: non focal, not following commands  Skin: no rash, no erythema  Lines: no phlebitis     FH: Non-contributory  Social Hx: Non-contributory    TESTS & MEASUREMENTS:                        8.4    10.30 )-----------( 698      ( 15 Feb 2024 04:44 )             28.1     02-15    139  |  99  |  14  ----------------------------<  149<H>  5.1<H>   |  31  |  0.5<L>    Ca    8.2<L>      15 Feb 2024 04:44  Mg     2.4     02-15          Urinalysis Basic - ( 15 Feb 2024 04:44 )    Color: x / Appearance: x / SG: x / pH: x  Gluc: 149 mg/dL / Ketone: x  / Bili: x / Urobili: x   Blood: x / Protein: x / Nitrite: x   Leuk Esterase: x / RBC: x / WBC x   Sq Epi: x / Non Sq Epi: x / Bacteria: x        Culture - Blood (collected 02-12-24 @ 11:17)  Source: .Blood None  Preliminary Report (02-14-24 @ 21:01):    No growth at 48 Hours    Culture - Blood (collected 02-09-24 @ 11:57)  Source: .Blood None  Final Report (02-14-24 @ 22:00):    No growth at 5 days    Culture - Blood (collected 02-09-24 @ 11:57)  Source: .Blood None  Final Report (02-14-24 @ 22:00):    No growth at 5 days    Culture - Blood (collected 02-03-24 @ 11:13)  Source: .Blood None  Final Report (02-08-24 @ 20:00):    No growth at 5 days    Culture - Blood (collected 02-01-24 @ 11:58)  Source: .Blood None  Final Report (02-06-24 @ 23:06):    No growth at 5 days    Culture - Urine (collected 02-01-24 @ 11:40)  Source: Clean Catch Clean Catch (Midstream)  Final Report (02-03-24 @ 00:06):    >=3 organisms. Probable collection contamination.    Culture - Blood (collected 01-31-24 @ 18:21)  Source: .Blood None  Final Report (02-06-24 @ 01:01):    No growth at 5 days    Culture - Blood (collected 01-31-24 @ 18:21)  Source: .Blood None  Final Report (02-06-24 @ 01:01):    No growth at 5 days    Culture - Urine (collected 01-23-24 @ 05:20)  Source: Clean Catch Clean Catch (Midstream)  Final Report (01-25-24 @ 00:26):    No growth    Culture - Blood (collected 01-22-24 @ 16:51)  Source: .Blood None  Final Report (01-28-24 @ 01:00):    No growth at 5 days    Culture - Urine (collected 01-21-24 @ 05:00)  Source: Clean Catch Clean Catch (Midstream)  Final Report (01-22-24 @ 07:15):    No growth    Culture - Blood (collected 01-20-24 @ 16:15)  Source: .Blood Blood-Peripheral  Gram Stain (01-21-24 @ 20:33):    Growth in aerobic bottle: Gram positive cocci in pairs  Final Report (01-22-24 @ 19:04):    Growth in aerobic bottle: Staphylococcus hominis    Isolation of Coagulase negative Staphylococcus from single blood culture    sets may represent    contamination. Contact the Microbiology Department at [phone] if    susceptibility testing is    clinically indicated.    Direct identification is available within approximately 3-5    hours either by Blood Panel Multiplexed PCR or Direct    MALDI-TOF. Details: [email or web address]  Organism: Blood Culture PCR (01-22-24 @ 19:04)  Organism: Blood Culture PCR (01-22-24 @ 19:04)      Method Type: PCR      -  Coagulase negative Staphylococcus: Detec    Culture - Blood (collected 01-20-24 @ 16:15)  Source: .Blood Blood-Peripheral  Final Report (01-25-24 @ 23:00):    No growth at 5 days            INFECTIOUS DISEASES TESTING  MRSA PCR Result.: Negative (02-15-24 @ 06:20)  Procalcitonin, Serum: 0.10 (02-12-24 @ 11:17)  Rapid RVP Result: NotDetec (02-12-24 @ 10:28)  MRSA PCR Result.: Negative (02-12-24 @ 10:28)  Fungitell: 63 (02-06-24 @ 11:27)  Fungitell: 65 (02-06-24 @ 04:43)  Rapid RVP Result: NotDetec (02-04-24 @ 10:28)  MRSA PCR Result.: Negative (02-03-24 @ 21:32)  Procalcitonin, Serum: 0.15 (02-03-24 @ 11:13)  Procalcitonin, Serum: 0.22 (02-01-24 @ 16:00)  MRSA PCR Result.: Negative (01-22-24 @ 05:30)  Streptococcus pneumoniae Ag, Ur Result: Negative (01-21-24 @ 05:00)  Legionella Antigen, Urine: Negative (01-21-24 @ 05:00)  Rapid RVP Result: Detected (01-21-24 @ 00:58)  Procalcitonin, Serum: 0.51 (01-20-24 @ 21:23)  Fungitell: 325 (01-20-24 @ 21:23)  Vancomycin Level, Trough: 5.9 (11-12-23 @ 17:55)  Fungitell: 138 (11-07-23 @ 08:08)  Fungitell: 115 (11-02-23 @ 11:40)  Procalcitonin, Serum: 0.04 (11-02-23 @ 11:40)  Procalcitonin, Serum: 0.26 (10-24-23 @ 11:00)  MRSA PCR Result.: Negative (10-17-23 @ 17:20)  Fungitell: >500 (10-17-23 @ 17:20)  Procalcitonin, Serum: 4.36 (10-17-23 @ 17:20)  Procalcitonin, Serum: 4.18 (10-17-23 @ 12:35)  Procalcitonin, Serum: 0.07 (10-15-23 @ 07:28)  COVID-19 PCR: NotDetec (10-12-23 @ 09:14)  Procalcitonin, Serum: 0.40 (10-07-23 @ 10:54)  Streptococcus pneumoniae Ag, Ur Result: Negative (09-30-23 @ 14:36)  Legionella Antigen, Urine: Negative (09-30-23 @ 14:36)  MRSA PCR Result.: Negative (09-29-23 @ 16:50)  Procalcitonin, Serum: 9.78 (08-12-23 @ 11:25)  MRSA PCR Result.: Negative (08-12-23 @ 06:10)  Rapid RVP Result: NotDetec (08-12-23 @ 01:33)  Procalcitonin, Serum: 0.63 (08-10-23 @ 08:46)  Procalcitonin, Serum: 7.82 (08-04-23 @ 16:13)  MRSA PCR Result.: Negative (08-04-23 @ 14:52)  Rapid RVP Result: NotDetec (08-04-23 @ 03:00)  strept    INFLAMMATORY MARKERS  Sedimentation Rate, Erythrocyte: 100 mm/Hr (02-03-24 @ 11:13)  C-Reactive Protein, Serum: 214.2 mg/L (02-03-24 @ 11:13)  C-Reactive Protein, Serum: 132.3 mg/L (02-01-24 @ 16:00)  Sedimentation Rate, Erythrocyte: 67 mm/Hr (10-15-23 @ 07:28)      RADIOLOGY & ADDITIONAL TESTS:  I have personally reviewed the last available Chest xray  CXR      CT      CARDIOLOGY TESTING      MEDICATIONS  acetylcysteine 20%  Inhalation 4 Inhalation every 4 hours  albuterol/ipratropium for Nebulization 3 Nebulizer every 4 hours  artificial  tears Solution 1 Both EYES two times a day  calcium carbonate   1250 mG (OsCal) 1 Oral two times a day  caspofungin IVPB 50 IV Intermittent every 24 hours  caspofungin IVPB     chlorhexidine 2% Cloths 1 Topical daily  enoxaparin Injectable 50 SubCutaneous every 12 hours  gabapentin 300 Oral every 12 hours  levETIRAcetam  Solution 500 Oral every 12 hours  levoFLOXacin  Tablet 500 Oral every 24 hours  melatonin 3 Oral at bedtime  meropenem  IVPB 1000 IV Intermittent every 8 hours  midodrine. 20 Oral three times a day  pantoprazole  Injectable 40 IV Push every 24 hours  polyethylene glycol 3350 17 Oral at bedtime  raloxifene 60 Oral daily  saccharomyces boulardii 250 Oral two times a day  senna 2 Oral at bedtime  silver sulfADIAZINE 1% Cream 1 Topical two times a day  sodium chloride 0.65% Nasal 2 Both Nostrils three times a day  sodium chloride 3%  Inhalation 4 Inhalation every 4 hours      WEIGHT  Weight (kg): 52.3 (01-21-24 @ 08:00)  Creatinine: 0.5 mg/dL (02-15-24 @ 04:44)      ANTIBIOTICS:  caspofungin IVPB      caspofungin IVPB 50 milliGRAM(s) IV Intermittent every 24 hours  levoFLOXacin  Tablet 500 milliGRAM(s) Oral every 24 hours  meropenem  IVPB 1000 milliGRAM(s) IV Intermittent every 8 hours      All available historical records have been reviewed

## 2024-02-16 LAB
ANION GAP SERPL CALC-SCNC: 9 MMOL/L — SIGNIFICANT CHANGE UP (ref 7–14)
BASOPHILS # BLD AUTO: 0.07 K/UL — SIGNIFICANT CHANGE UP (ref 0–0.2)
BASOPHILS NFR BLD AUTO: 0.7 % — SIGNIFICANT CHANGE UP (ref 0–1)
BUN SERPL-MCNC: 14 MG/DL — SIGNIFICANT CHANGE UP (ref 10–20)
CALCIUM SERPL-MCNC: 8.4 MG/DL — SIGNIFICANT CHANGE UP (ref 8.4–10.5)
CHLORIDE SERPL-SCNC: 98 MMOL/L — SIGNIFICANT CHANGE UP (ref 98–110)
CO2 SERPL-SCNC: 31 MMOL/L — SIGNIFICANT CHANGE UP (ref 17–32)
CREAT SERPL-MCNC: <0.5 MG/DL — LOW (ref 0.7–1.5)
EGFR: 115 ML/MIN/1.73M2 — SIGNIFICANT CHANGE UP
EOSINOPHIL # BLD AUTO: 0.23 K/UL — SIGNIFICANT CHANGE UP (ref 0–0.7)
EOSINOPHIL NFR BLD AUTO: 2.4 % — SIGNIFICANT CHANGE UP (ref 0–8)
GLUCOSE BLDC GLUCOMTR-MCNC: 277 MG/DL — HIGH (ref 70–99)
GLUCOSE SERPL-MCNC: 112 MG/DL — HIGH (ref 70–99)
HCT VFR BLD CALC: 25.9 % — LOW (ref 37–47)
HGB BLD-MCNC: 7.9 G/DL — LOW (ref 12–16)
IMM GRANULOCYTES NFR BLD AUTO: 0.4 % — HIGH (ref 0.1–0.3)
LYMPHOCYTES # BLD AUTO: 1.2 K/UL — SIGNIFICANT CHANGE UP (ref 1.2–3.4)
LYMPHOCYTES # BLD AUTO: 12.8 % — LOW (ref 20.5–51.1)
MCHC RBC-ENTMCNC: 23.9 PG — LOW (ref 27–31)
MCHC RBC-ENTMCNC: 30.5 G/DL — LOW (ref 32–37)
MCV RBC AUTO: 78.5 FL — LOW (ref 81–99)
MONOCYTES # BLD AUTO: 0.62 K/UL — HIGH (ref 0.1–0.6)
MONOCYTES NFR BLD AUTO: 6.6 % — SIGNIFICANT CHANGE UP (ref 1.7–9.3)
NEUTROPHILS # BLD AUTO: 7.25 K/UL — HIGH (ref 1.4–6.5)
NEUTROPHILS NFR BLD AUTO: 77.1 % — HIGH (ref 42.2–75.2)
NRBC # BLD: 0 /100 WBCS — SIGNIFICANT CHANGE UP (ref 0–0)
PLATELET # BLD AUTO: 617 K/UL — HIGH (ref 130–400)
PMV BLD: 10.6 FL — HIGH (ref 7.4–10.4)
POTASSIUM SERPL-MCNC: 5 MMOL/L — SIGNIFICANT CHANGE UP (ref 3.5–5)
POTASSIUM SERPL-SCNC: 5 MMOL/L — SIGNIFICANT CHANGE UP (ref 3.5–5)
RBC # BLD: 3.3 M/UL — LOW (ref 4.2–5.4)
RBC # FLD: 21.5 % — HIGH (ref 11.5–14.5)
SODIUM SERPL-SCNC: 138 MMOL/L — SIGNIFICANT CHANGE UP (ref 135–146)
T4 FREE SERPL-MCNC: 1.1 NG/DL — SIGNIFICANT CHANGE UP (ref 0.9–1.8)
WBC # BLD: 9.41 K/UL — SIGNIFICANT CHANGE UP (ref 4.8–10.8)
WBC # FLD AUTO: 9.41 K/UL — SIGNIFICANT CHANGE UP (ref 4.8–10.8)

## 2024-02-16 PROCEDURE — 71045 X-RAY EXAM CHEST 1 VIEW: CPT | Mod: 26

## 2024-02-16 PROCEDURE — 99291 CRITICAL CARE FIRST HOUR: CPT

## 2024-02-16 PROCEDURE — 99233 SBSQ HOSP IP/OBS HIGH 50: CPT

## 2024-02-16 RX ADMIN — Medication 4 MILLILITER(S): at 20:38

## 2024-02-16 RX ADMIN — Medication 4 MILLILITER(S): at 07:58

## 2024-02-16 RX ADMIN — ENOXAPARIN SODIUM 50 MILLIGRAM(S): 100 INJECTION SUBCUTANEOUS at 05:08

## 2024-02-16 RX ADMIN — Medication 250 MILLIGRAM(S): at 05:09

## 2024-02-16 RX ADMIN — GABAPENTIN 300 MILLIGRAM(S): 400 CAPSULE ORAL at 18:26

## 2024-02-16 RX ADMIN — Medication 3 MILLILITER(S): at 12:24

## 2024-02-16 RX ADMIN — Medication 3 MILLIGRAM(S): at 23:06

## 2024-02-16 RX ADMIN — MEROPENEM 100 MILLIGRAM(S): 1 INJECTION INTRAVENOUS at 13:29

## 2024-02-16 RX ADMIN — MIDODRINE HYDROCHLORIDE 20 MILLIGRAM(S): 2.5 TABLET ORAL at 11:41

## 2024-02-16 RX ADMIN — MIDODRINE HYDROCHLORIDE 20 MILLIGRAM(S): 2.5 TABLET ORAL at 05:09

## 2024-02-16 RX ADMIN — LEVETIRACETAM 500 MILLIGRAM(S): 250 TABLET, FILM COATED ORAL at 05:08

## 2024-02-16 RX ADMIN — Medication 1 TABLET(S): at 18:26

## 2024-02-16 RX ADMIN — Medication 4 MILLILITER(S): at 15:09

## 2024-02-16 RX ADMIN — Medication 2 SPRAY(S): at 23:07

## 2024-02-16 RX ADMIN — CASPOFUNGIN ACETATE 260 MILLIGRAM(S): 7 INJECTION, POWDER, LYOPHILIZED, FOR SOLUTION INTRAVENOUS at 11:40

## 2024-02-16 RX ADMIN — Medication 3 MILLILITER(S): at 20:39

## 2024-02-16 RX ADMIN — Medication 325 MILLIGRAM(S): at 21:34

## 2024-02-16 RX ADMIN — ENOXAPARIN SODIUM 50 MILLIGRAM(S): 100 INJECTION SUBCUTANEOUS at 18:26

## 2024-02-16 RX ADMIN — Medication 1 APPLICATION(S): at 18:28

## 2024-02-16 RX ADMIN — Medication 1 DROP(S): at 05:11

## 2024-02-16 RX ADMIN — Medication 3 MILLILITER(S): at 15:09

## 2024-02-16 RX ADMIN — GABAPENTIN 300 MILLIGRAM(S): 400 CAPSULE ORAL at 05:09

## 2024-02-16 RX ADMIN — Medication 4 MILLILITER(S): at 12:25

## 2024-02-16 RX ADMIN — PANTOPRAZOLE SODIUM 40 MILLIGRAM(S): 20 TABLET, DELAYED RELEASE ORAL at 05:08

## 2024-02-16 RX ADMIN — Medication 1 APPLICATION(S): at 05:10

## 2024-02-16 RX ADMIN — SODIUM CHLORIDE 4 MILLILITER(S): 9 INJECTION INTRAMUSCULAR; INTRAVENOUS; SUBCUTANEOUS at 20:39

## 2024-02-16 RX ADMIN — Medication 2 SPRAY(S): at 05:11

## 2024-02-16 RX ADMIN — Medication 2 SPRAY(S): at 13:20

## 2024-02-16 RX ADMIN — Medication 1 TABLET(S): at 05:09

## 2024-02-16 RX ADMIN — CHLORHEXIDINE GLUCONATE 1 APPLICATION(S): 213 SOLUTION TOPICAL at 11:40

## 2024-02-16 RX ADMIN — Medication 1 DROP(S): at 18:26

## 2024-02-16 RX ADMIN — MIDODRINE HYDROCHLORIDE 20 MILLIGRAM(S): 2.5 TABLET ORAL at 18:27

## 2024-02-16 RX ADMIN — MEROPENEM 100 MILLIGRAM(S): 1 INJECTION INTRAVENOUS at 05:07

## 2024-02-16 RX ADMIN — LEVETIRACETAM 500 MILLIGRAM(S): 250 TABLET, FILM COATED ORAL at 18:27

## 2024-02-16 RX ADMIN — Medication 3 MILLILITER(S): at 07:57

## 2024-02-16 NOTE — PROGRESS NOTE ADULT - SUBJECTIVE AND OBJECTIVE BOX
58F w/ PMHx Down Syndrome, nonverbal at baseline, Hypothyroidism, Cerebral palsy and Seizure Disorders presents to the ED from nursing facility/group home ([facility]) presented to ED for respiratory distress and high grade fever. Patient found to be septic on admission.Admitted to SDU for management of acute respiratory distress 2/2 CAP/Aspiration PNA (20 Jan 2024 18:22)  While pt was on the floor she developed dyspnea after swallow evaluation, was spiking high grade fever, consulted by pulmonary/critical care and was upgraded back to SDU.  Pt developed left lung white out, improved after aggressive chest PT, treated with Meropenem and caspofungin, ID is following, her overall prognosis is very poor, she might need trach and PEG in the nearest future , a meeting with facility staff and state took place on 2/14.   Today pt is awake, looks comfortable, aid at the bedside.       PAST MEDICAL & SURGICAL HISTORY:  Down syndrome  Osteoporosis  Mild anemia  Neuropathy  S/P debridement of R hip on 3/2/21    Allergies  No Known Allergies    Vital Signs Last 24 Hrs  T(C): 37.6 (16 Feb 2024 16:00), Max: 37.6 (15 Feb 2024 20:00)  T(F): 99.7 (16 Feb 2024 16:00), Max: 99.7 (16 Feb 2024 16:00)  HR: 102 (16 Feb 2024 16:00) (76 - 113)  BP: 96/46 (16 Feb 2024 16:00) (81/45 - 96/51)  BP(mean): 62 (16 Feb 2024 12:45) (58 - 68)  RR: 20 (16 Feb 2024 16:00) (18 - 20)  SpO2: 94% (16 Feb 2024 16:00) (94% - 100%)    Parameters below as of 16 Feb 2024 16:00  Patient On (Oxygen Delivery Method): nasal cannula, high flow      PHYSICAL EXAM:   GENERAL: NAD, nonverbal, awake   HEENT: atraumatic, EOMI.  Lungs: coarse BS   CVS: SIS2 +,  no murmur.  Abdomen/ GI:  Soft,  NT, ND, BS+  CNS: awake , non verbal, anxious   Ext: contracted  Skin: no rash, no ulcers.    LABs:                           7.9    9.41  )-----------( 617      ( 16 Feb 2024 05:30 )             25.9   02-16    138  |  98  |  14  ----------------------------<  112<H>  5.0   |  31  |  <0.5<L>    Ca    8.4      16 Feb 2024 05:30  Mg     2.4     02-15        Parameters below as of 04 Feb 2024 08:47  Patient On (Oxygen Delivery Method): nasal cannula, high flow  O2 Flow (L/min): 60  O2 Concentration (%): 100  Urinalysis Basic - ( 01 Feb 2024 11:40 )    Color: Yellow / Appearance: Turbid / SG: >1.030 / pH: x  Gluc: x / Ketone: Negative mg/dL  / Bili: Negative / Urobili: 1.0 mg/dL   Blood: x / Protein: 100 mg/dL / Nitrite: Negative   Leuk Esterase: Negative / RBC: 10 /HPF / WBC 5 /HPF   Sq Epi: x / Non Sq Epi: 2 /HPF / Bacteria: Negative /HPF    Culture - Blood (01.31.24 @ 18:21)   Specimen Source: .Blood None  Culture Results:   No growth at 24 hoursCulture - Urine (01.23.24 @ 05:20)   Specimen Source: Clean Catch Clean Catch (Midstream)  Culture Results:   No growthCulture - Blood (01.20.24 @ 16:15)   - Coagulase negative Staphylococcus: Detec  Gram Stain:   Growth in aerobic bottle: Gram positive cocci in pairs  Specimen Source: .Blood Blood-Peripheral  Organism: Blood Culture PCR  Culture Results:   Growth in aerobic bottle: Staphylococcus hominis   Isolation of Coagulase negative Staphylococcus from single blood culture     sets may represent   contamination. Contact the Microbiology Department at [phone] if   susceptibility testing is   clinically indicated.   Direct identification is available within approximately 3-5   hours either by Blood Panel Multiplexed PCR or Direct   MALDI-TOF. Details: [email or web address]  Organism Identification: Blood Culture PCR  Method Type: PCR    Culture - Blood (02.12.24 @ 11:17)   Specimen Source: .Blood None  Culture Results:   No growth at 24 hours      RADIOLOGY:     < from: Xray Chest 1 View-PORTABLE IMMEDIATE (Xray Chest 1 View-PORTABLE IMMEDIATE .) (02.14.24 @ 08:11) >  IMPRESSION:    1. Enteric tube seen to the level of the stomach.  2. Bilateral opacities, worsened on the left    < end of copied text >  < from: [facility] Duplex Lower Ext Vein Scan, Bilat (02.11.24 @ 13:47) >    IMPRESSION:  No evidence of deep venous thrombosis in either lower extremity.    < end of copied text >      < from: VA Duplex Lower Ext Vein Scan, Bilat (01.22.24 @ 14:52) >  IMPRESSION:  No evidence of deep venous thrombosis in either lower extremity.    < end of copied text >  < from: TTE Echo Complete w/ Contrast w/o Doppler (01.22.24 @ 13:35) >  Summary:   1. Technically difficult and limited study.   2. Endocardial visualization was enhanced with intravenous echo contrast.   3. Left ventricular ejection fraction, by visual estimation, is >55%.   4. Normal global left ventricular systolic function.   5. The left ventricular diastolic function could not be assessed in this   study.  < from: Xray Chest 1 View- PORTABLE-Routine (Xray Chest 1 View- PORTABLE-Routine in AM.) (02.03.24 @ 06:48) >    Impression:    Stable bilateral opacities        MEDICATIONS  (STANDING):  acetylcysteine 20%  Inhalation 4 milliLiter(s) Inhalation every 4 hours  albuterol/ipratropium for Nebulization 3 milliLiter(s) Nebulizer every 4 hours  artificial  tears Solution 1 Drop(s) Both EYES two times a day  calcium carbonate   1250 mG (OsCal) 1 Tablet(s) Oral two times a day  caspofungin IVPB 50 milliGRAM(s) IV Intermittent every 24 hours  caspofungin IVPB      chlorhexidine 2% Cloths 1 Application(s) Topical daily  enoxaparin Injectable 50 milliGRAM(s) SubCutaneous every 12 hours  gabapentin 300 milliGRAM(s) Oral every 12 hours  levETIRAcetam  Solution 500 milliGRAM(s) Oral every 12 hours  levoFLOXacin  Tablet 500 milliGRAM(s) Oral every 24 hours  melatonin 3 milliGRAM(s) Oral at bedtime  midodrine. 20 milliGRAM(s) Oral three times a day  pantoprazole  Injectable 40 milliGRAM(s) IV Push every 24 hours  polyethylene glycol 3350 17 Gram(s) Oral at bedtime  raloxifene 60 milliGRAM(s) Oral daily  saccharomyces boulardii 250 milliGRAM(s) Oral two times a day  senna 2 Tablet(s) Oral at bedtime  silver sulfADIAZINE 1% Cream 1 Application(s) Topical two times a day  sodium chloride 0.65% Nasal 2 Spray(s) Both Nostrils three times a day  sodium chloride 3%  Inhalation 4 milliLiter(s) Inhalation every 4 hours    MEDICATIONS  (PRN):  acetaminophen     Tablet .. 650 milliGRAM(s) Oral every 6 hours PRN Temp greater or equal to 38C (100.4F), Mild Pain (1 - 3)  aluminum hydroxide/magnesium hydroxide/simethicone Suspension 30 milliLiter(s) Oral every 4 hours PRN Dyspepsia  ondansetron Injectable 4 milliGRAM(s) IV Push every 8 hours PRN Nausea and/or Vomiting

## 2024-02-16 NOTE — PROGRESS NOTE ADULT - SUBJECTIVE AND OBJECTIVE BOX
Subjective:    Today is hospital day 27d. This morning patient was seen and examined at bedside, resting comfortably in bed.    No acute or major events overnight.      PAST MEDICAL & SURGICAL HISTORY  Down syndrome    Osteoporosis    Mild anemia    Neuropathy    S/P debridement  of R hip on 3/2/21          ALLERGIES:  No Known Allergies    MEDICATIONS:  STANDING MEDICATIONS  acetylcysteine 20%  Inhalation 4 milliLiter(s) Inhalation every 4 hours  albuterol/ipratropium for Nebulization 3 milliLiter(s) Nebulizer every 4 hours  artificial  tears Solution 1 Drop(s) Both EYES two times a day  calcium carbonate   1250 mG (OsCal) 1 Tablet(s) Oral two times a day  caspofungin IVPB 50 milliGRAM(s) IV Intermittent every 24 hours  caspofungin IVPB      chlorhexidine 2% Cloths 1 Application(s) Topical daily  enoxaparin Injectable 50 milliGRAM(s) SubCutaneous every 12 hours  gabapentin 300 milliGRAM(s) Oral every 12 hours  levETIRAcetam  Solution 500 milliGRAM(s) Oral every 12 hours  levoFLOXacin  Tablet 500 milliGRAM(s) Oral every 24 hours  melatonin 3 milliGRAM(s) Oral at bedtime  midodrine. 20 milliGRAM(s) Oral three times a day  pantoprazole  Injectable 40 milliGRAM(s) IV Push every 24 hours  polyethylene glycol 3350 17 Gram(s) Oral at bedtime  raloxifene 60 milliGRAM(s) Oral daily  saccharomyces boulardii 250 milliGRAM(s) Oral two times a day  senna 2 Tablet(s) Oral at bedtime  silver sulfADIAZINE 1% Cream 1 Application(s) Topical two times a day  sodium chloride 0.65% Nasal 2 Spray(s) Both Nostrils three times a day  sodium chloride 3%  Inhalation 4 milliLiter(s) Inhalation every 4 hours    PRN MEDICATIONS  acetaminophen     Tablet .. 650 milliGRAM(s) Oral every 6 hours PRN  aluminum hydroxide/magnesium hydroxide/simethicone Suspension 30 milliLiter(s) Oral every 4 hours PRN  ondansetron Injectable 4 milliGRAM(s) IV Push every 8 hours PRN      Objective:  VITALS:   T(F): 99.6  HR: 90  BP: 83/52  RR: 20  SpO2: 97%    PHYSICAL EXAM:  GENERAL:  NAD chronically ill appearing, pale   PULMONARY: decreased breath sounds B/L. No wheezing.   CVS: Normal S1, S2. Rate and Rhythm are regular.   ABDOMEN/GI: Soft, Nontender, non distended   NEUROLOGIC: opens eyes at times, does not follow commands   PSYCH: lethargic   NO LLE        LABS:  LABS FOR NEXT DAY : Yes ( x ) No (  )                        7.9    9.41  )-----------( 617      ( 16 Feb 2024 05:30 )             25.9     02-16    138  |  98  |  14  ----------------------------<  112<H>  5.0   |  31  |  <0.5<L>    Ca    8.4      16 Feb 2024 05:30  Mg     2.4     02-15

## 2024-02-16 NOTE — PROGRESS NOTE ADULT - ASSESSMENT
58F w/ PMHx Down Syndrome, nonverbal at baseline, Hypothyroidism, Cerebral palsy and Seizure Disorders presents to the ED from nursing facility/group home ([facility]) presented to ED for respiratory distress and high grade fever. Patient found to be septic on admission.Admitted to SDU for management of acute respiratory distress 2/2 CAP/Aspiration PNA (20 Jan 2024 18:22)  While pt was on the floor she developed dyspnea after swallow evaluation, was spiking high grade fever, consulted by pulmonary/critical care and was upgraded back to SDU.    A/P  # Sepsis POA / Acute hypoxic resp failure / RSV bronchiolitis /  H/o Dysphagia/ suspected aspiration PNA   - CXR noted,   likely resolving mucous plug  - aggressive chest PT with vest, aspiration precautions and suction   - ID is following, recommendations noted:  - completed the course of   meropenem  15 days  - c/w Levofloxacin 500 mg Q 24 hours  as per ID   - Fungitell started downtrending 1 day after Caspo started  - Continue caspofungin 50mg q24h IV , will likely need empiric 14 day course. 2/18  - pt has no risk factors for PCP or other invasive fungal infection   - CT Chest, AP when stable   - MRSA negative   - BCx (1/20): Staph hominis -  contaminant repeat BCx have been NGTD  - Failed FEES, put  NG tube for feedings and medications, pt'll likely need PEG, consulted by GI   - Aspiration precautions, order chest PT vest   - Scopolamine patch d/c on 2/14  - supplement oxygen, monitor pulse Ox   - c/w duoneb  - pulmonary is following, recommendations noted:   - Wean FiO2 Keep spo2 more than 92%.  Daily CXR. Aggressive pulm toilet, frequent suctioning.  Might need MV.    # Hypotension  - c/w   Midodrine  20 mg Q 8 hours   - off pressure support now, stable   - keep MAP above 60     # Elevated Troponin , type 2 MI/  Sinus tachycardia  - c/w telemetry monitoring   - Repeat EKG: Normal sinus rhythm  - c/w  metoprolol  -  maintain fluid balance     # Seizure Disorder  - c/w  Keppra   - seizure precautions  - keep Mg above 2.0     # H/o lower ext DVT  - therapeutic Lovenox recommended by ICU team  - Eliquis held     # Hypothyroidism  - Thyroid Stimulating Hormone, Serum: 0.51 uIU/mL (01.21.24 @ 06:34)  - check FT4     # Normocytic Anemia   - monitor H/H, keep Hb above 7.5     # Down Syndrome/  Cerebral Palsy  - supportive care  - prevent falls and aspiration   - failed speech and swallow, needs PEG   - on Raloxifene     # Full code    # GI prophylaxis       Pending (specify):  pulmonary toilet, chest PT vest,  frequent suction,  c/w  Caspofungin, c/w Levofloxacin,  c/w  Therapeutic Lovenox , monitor WBC and fever curve,  supportive care, pt needs PEG, daily CXR   overall prognosis is very poor

## 2024-02-16 NOTE — PROGRESS NOTE ADULT - SUBJECTIVE AND OBJECTIVE BOX
Patient is a 58y old  Female who presents with a chief complaint of Respiratory Distress (15 Feb 2024 16:02)      Over Night Events: remains on HFNC 60/90. pressors were weaned off since yesterday      ROS:     All ROS are negative except HPI         PHYSICAL EXAM    ICU Vital Signs Last 24 Hrs  T(C): 36.1 (16 Feb 2024 08:30), Max: 37.7 (15 Feb 2024 16:00)  T(F): 96.9 (16 Feb 2024 08:30), Max: 99.9 (15 Feb 2024 16:00)  HR: 83 (16 Feb 2024 08:30) (80 - 113)  BP: 96/51 (16 Feb 2024 08:30) (81/45 - 97/53)  BP(mean): 58 (16 Feb 2024 04:00) (58 - 68)  ABP: --  ABP(mean): --  RR: 20 (16 Feb 2024 08:30) (18 - 20)  SpO2: 100% (16 Feb 2024 08:30) (94% - 100%)    O2 Parameters below as of 15 Feb 2024 22:00  Patient On (Oxygen Delivery Method): nasal cannula, high flow  O2 Flow (L/min): 60  O2 Concentration (%): 90    CONSTITUTIONAL:  Ill appearing     CARDIAC:   Normal rate,   Regular rhythm.    No edema    RESPIRATORY:   No wheezing  Bilateral BS  Decreased BS noted    GASTROINTESTINAL:  Abdomen soft,   Non-tender,   No guarding,     MUSCULOSKELETAL:   Range of motion is not limited,  No clubbing, cyanosis    NEUROLOGICAL:   alert, arousable    SKIN:   Heel and sacrum friction wound    02-15-24 @ 07:01  -  02-16-24 @ 07:00  --------------------------------------------------------  IN:    Enteral Tube Flush: 150 mL    IV PiggyBack: 100 mL    Jevity 1.2: 300 mL    Norepinephrine: 7.7 mL  Total IN: 557.7 mL    OUT:    Voided (mL): 251 mL  Total OUT: 251 mL    Total NET: 306.7 mL          LABS:                            7.9    9.41  )-----------( 617      ( 16 Feb 2024 05:30 )             25.9                                               02-16    138  |  98  |  14  ----------------------------<  112<H>  5.0   |  31  |  <0.5<L>    Ca    8.4      16 Feb 2024 05:30  Mg     2.4     02-15                                               Urinalysis Basic - ( 16 Feb 2024 05:30 )    Color: x / Appearance: x / SG: x / pH: x  Gluc: 112 mg/dL / Ketone: x  / Bili: x / Urobili: x   Blood: x / Protein: x / Nitrite: x   Leuk Esterase: x / RBC: x / WBC x   Sq Epi: x / Non Sq Epi: x / Bacteria: x                                                                                                                                                                          MEDICATIONS  (STANDING):  acetylcysteine 20%  Inhalation 4 milliLiter(s) Inhalation every 4 hours  albuterol/ipratropium for Nebulization 3 milliLiter(s) Nebulizer every 4 hours  artificial  tears Solution 1 Drop(s) Both EYES two times a day  calcium carbonate   1250 mG (OsCal) 1 Tablet(s) Oral two times a day  caspofungin IVPB      caspofungin IVPB 50 milliGRAM(s) IV Intermittent every 24 hours  chlorhexidine 2% Cloths 1 Application(s) Topical daily  enoxaparin Injectable 50 milliGRAM(s) SubCutaneous every 12 hours  gabapentin 300 milliGRAM(s) Oral every 12 hours  levETIRAcetam  Solution 500 milliGRAM(s) Oral every 12 hours  levoFLOXacin  Tablet 500 milliGRAM(s) Oral every 24 hours  melatonin 3 milliGRAM(s) Oral at bedtime  meropenem  IVPB 1000 milliGRAM(s) IV Intermittent every 8 hours  midodrine. 20 milliGRAM(s) Oral three times a day  pantoprazole  Injectable 40 milliGRAM(s) IV Push every 24 hours  polyethylene glycol 3350 17 Gram(s) Oral at bedtime  raloxifene 60 milliGRAM(s) Oral daily  saccharomyces boulardii 250 milliGRAM(s) Oral two times a day  senna 2 Tablet(s) Oral at bedtime  silver sulfADIAZINE 1% Cream 1 Application(s) Topical two times a day  sodium chloride 0.65% Nasal 2 Spray(s) Both Nostrils three times a day  sodium chloride 3%  Inhalation 4 milliLiter(s) Inhalation every 4 hours    MEDICATIONS  (PRN):  acetaminophen     Tablet .. 650 milliGRAM(s) Oral every 6 hours PRN Temp greater or equal to 38C (100.4F), Mild Pain (1 - 3)  aluminum hydroxide/magnesium hydroxide/simethicone Suspension 30 milliLiter(s) Oral every 4 hours PRN Dyspepsia  ondansetron Injectable 4 milliGRAM(s) IV Push every 8 hours PRN Nausea and/or Vomiting      New X-rays reviewed:                                                                                  ECHO    CXR interpreted by me:       Patient is a 58y old  Female who presents with a chief complaint of Respiratory Distress (15 Feb 2024 16:02)      Over Night Events: remains on HFNC 60/90. pressors were weaned off since yesterday      ROS:     All ROS are negative except HPI         PHYSICAL EXAM    ICU Vital Signs Last 24 Hrs  T(C): 36.1 (16 Feb 2024 08:30), Max: 37.7 (15 Feb 2024 16:00)  T(F): 96.9 (16 Feb 2024 08:30), Max: 99.9 (15 Feb 2024 16:00)  HR: 83 (16 Feb 2024 08:30) (80 - 113)  BP: 96/51 (16 Feb 2024 08:30) (81/45 - 97/53)  BP(mean): 58 (16 Feb 2024 04:00) (58 - 68)  ABP: --  ABP(mean): --  RR: 20 (16 Feb 2024 08:30) (18 - 20)  SpO2: 100% (16 Feb 2024 08:30) (94% - 100%)    O2 Parameters below as of 15 Feb 2024 22:00  Patient On (Oxygen Delivery Method): nasal cannula, high flow  O2 Flow (L/min): 60  O2 Concentration (%): 90    CONSTITUTIONAL:  Ill appearing     CARDIAC:   Normal rate,   Regular rhythm.    No edema    RESPIRATORY:   No wheezing  Bilateral BS  Decreased BS noted    GASTROINTESTINAL:  Abdomen soft,   Non-tender,   No guarding,     MUSCULOSKELETAL:   Range of motion is not limited,  No clubbing, cyanosis    NEUROLOGICAL:   alert, arousable    SKIN:   Heel and sacrum friction wound    02-15-24 @ 07:01  -  02-16-24 @ 07:00  --------------------------------------------------------  IN:    Enteral Tube Flush: 150 mL    IV PiggyBack: 100 mL    Jevity 1.2: 300 mL    Norepinephrine: 7.7 mL  Total IN: 557.7 mL    OUT:    Voided (mL): 251 mL  Total OUT: 251 mL    Total NET: 306.7 mL          LABS:                            7.9    9.41  )-----------( 617      ( 16 Feb 2024 05:30 )             25.9                                               02-16    138  |  98  |  14  ----------------------------<  112<H>  5.0   |  31  |  <0.5<L>    Ca    8.4      16 Feb 2024 05:30  Mg     2.4     02-15                                               Urinalysis Basic - ( 16 Feb 2024 05:30 )    Color: x / Appearance: x / SG: x / pH: x  Gluc: 112 mg/dL / Ketone: x  / Bili: x / Urobili: x   Blood: x / Protein: x / Nitrite: x   Leuk Esterase: x / RBC: x / WBC x   Sq Epi: x / Non Sq Epi: x / Bacteria: x                                                                                                                                                                          MEDICATIONS  (STANDING):  acetylcysteine 20%  Inhalation 4 milliLiter(s) Inhalation every 4 hours  albuterol/ipratropium for Nebulization 3 milliLiter(s) Nebulizer every 4 hours  artificial  tears Solution 1 Drop(s) Both EYES two times a day  calcium carbonate   1250 mG (OsCal) 1 Tablet(s) Oral two times a day  caspofungin IVPB      caspofungin IVPB 50 milliGRAM(s) IV Intermittent every 24 hours  chlorhexidine 2% Cloths 1 Application(s) Topical daily  enoxaparin Injectable 50 milliGRAM(s) SubCutaneous every 12 hours  gabapentin 300 milliGRAM(s) Oral every 12 hours  levETIRAcetam  Solution 500 milliGRAM(s) Oral every 12 hours  levoFLOXacin  Tablet 500 milliGRAM(s) Oral every 24 hours  melatonin 3 milliGRAM(s) Oral at bedtime  meropenem  IVPB 1000 milliGRAM(s) IV Intermittent every 8 hours  midodrine. 20 milliGRAM(s) Oral three times a day  pantoprazole  Injectable 40 milliGRAM(s) IV Push every 24 hours  polyethylene glycol 3350 17 Gram(s) Oral at bedtime  raloxifene 60 milliGRAM(s) Oral daily  saccharomyces boulardii 250 milliGRAM(s) Oral two times a day  senna 2 Tablet(s) Oral at bedtime  silver sulfADIAZINE 1% Cream 1 Application(s) Topical two times a day  sodium chloride 0.65% Nasal 2 Spray(s) Both Nostrils three times a day  sodium chloride 3%  Inhalation 4 milliLiter(s) Inhalation every 4 hours    MEDICATIONS  (PRN):  acetaminophen     Tablet .. 650 milliGRAM(s) Oral every 6 hours PRN Temp greater or equal to 38C (100.4F), Mild Pain (1 - 3)  aluminum hydroxide/magnesium hydroxide/simethicone Suspension 30 milliLiter(s) Oral every 4 hours PRN Dyspepsia  ondansetron Injectable 4 milliGRAM(s) IV Push every 8 hours PRN Nausea and/or Vomiting      New X-rays reviewed:                                                                                  ECHO

## 2024-02-16 NOTE — PROGRESS NOTE ADULT - ASSESSMENT
ASSESSMENT  58F w/ PMHx Down Syndrome, nonverbal at baseline, Hypothyroidism, Cerebral palsy and Seizure Disorders presents to the ED from nursing facility/group home presented to ED for respiratory distress and high grade fever.     IMPRESSION  #Fevers, resolving , HAP / aspiration   Possible RUE phlebitis   Unclear source , fever curve and WBC downtrending with caspofungin, unclear source    2/12 BCX NGTD    Procalcitonin, Serum: 0.10 (02-12-24 @ 11:17)    Rapid RVP Result: NotDetec (02-12-24 @ 10:28)    MRSA PCR Result.: Negative (02-12-24 @ 10:28)    2/9 BCX NGTD x2    2/3 BCX NGTD     2/1 BCX NGTD     2/1 UCX   >=3 organisms. Probable collection contamination.    1/31 BCX NG    Rapid RVP Result: NotDetec (02-04-24 @ 10:28)    MRSA PCR Result.: Negative (02-03-24 @ 21:32)     UA without significant pyuria   Procalcitonin, Serum: 0.22 (02-01-24 @ 16:00)--> Procalcitonin, Serum: 0.15 (02-03-24 @ 11:13), downtrending ; unremarkable   MRSA PCR Result.: Negative (01-22-24 @ 05:30)  < from: Xray Chest 1 View-PORTABLE IMMEDIATE (Xray Chest 1 View-PORTABLE IMMEDIATE .) (02.01.24 @ 03:02) >  Bibasal opacities without significant change.   #Acute hypoxic respiratory failure- Gram Negative pneumonia   #1/20 BCX 1/4 bottles : Staphylococcus hominis- contaminant   Repeat CX NG   #Severe Sepsis on admission  #RSV + 1/21  #Elevated fungitell   Fungitell: 63 (02-06-24 @ 11:27)  Fungitell: 325 (01-20-24 @ 21:23)  Fungitell: 138 (11-07-23 @ 08:08)  Fungitell: 115 (11-02-23 @ 11:40)  Fungitell: >500 pg/mL (10.17.23 @ 17:20) s/p empiric caspo   #Full thickness ulcer right heel- Appreciate burn/podiatry evaluation.  #History of Right planter foot ulcers - two full thickness ulcers - serous drainage with mild erythema with OM  - MR Foot No Cont, Right (10.16.23 @ 21:51): IMPRESSION: 1.  Limited exam. 2.  Osteomyelitis of the first metatarsal stump. 3.  Osteomyelitis of the second toe distal phalanx.  - s/p excidional debridement to and including bone 1st metatarsal with partial 2nd digit amputation - 1st metatarsal head resected - Wound Cx Proteus ESBL   #CKD 2-3 Creatinine: 0.7 mg/dL (02.02.24 @ 04:59)      #History of buttock ulcer  #Down syndrome/Cerebral Palsy     RECOMMENDATIONS  - PO levaquin 500mg daily x 7 days end 2/21  - D/C meropenem and monitor, s/p extensive course. only restart if hemodynamic compromise, (1/21-1/27), 2/1- present   - Fungitell started downtrending 1 day after Caspo started, doubt related, but at this point given critical illness would plan on completing a course  - Continue caspofungin 50mg q24h IV , hx unclear elevated fungitell. No clear source, will likely need empiric 14 day course. 2/18. No risk factors for PCP or other invasive fungal infection   - CT Chest, AP when stable   - Grave prognosis, GOC , aggressive care is futile    If any questions, please send a message or call on [employer]  Please continue to update ID with any pertinent new laboratory or radiographic findings.

## 2024-02-16 NOTE — PROGRESS NOTE ADULT - SUBJECTIVE AND OBJECTIVE BOX
JERICHO TEA  58y, Female  Allergy: No Known Allergies      LOS  27d    CHIEF COMPLAINT: Respiratory Distress (16 Feb 2024 09:38)      INTERVAL EVENTS/HPI  - No acute events overnight  - T(F): , Max: 99.9 (02-15-24 @ 16:00)  - Tolerating medication  - WBC Count: 9.41 (02-16-24 @ 05:30)  WBC Count: 10.30 (02-15-24 @ 04:44)     - Creatinine: <0.5 (02-16-24 @ 05:30)  Creatinine: 0.5 (02-15-24 @ 04:44)       ROS  unable to obtain history secondary to patient's mental status and/or sedation     VITALS:  T(F): 99.6, Max: 99.9 (02-15-24 @ 16:00)  HR: 90  BP: 83/52  RR: 20Vital Signs Last 24 Hrs  T(C): 37.6 (16 Feb 2024 10:30), Max: 37.7 (15 Feb 2024 16:00)  T(F): 99.6 (16 Feb 2024 10:30), Max: 99.9 (15 Feb 2024 16:00)  HR: 90 (16 Feb 2024 12:45) (76 - 113)  BP: 83/52 (16 Feb 2024 12:45) (81/45 - 96/51)  BP(mean): 62 (16 Feb 2024 12:45) (58 - 68)  RR: 20 (16 Feb 2024 10:30) (18 - 20)  SpO2: 97% (16 Feb 2024 10:30) (94% - 100%)    Parameters below as of 16 Feb 2024 07:30  Patient On (Oxygen Delivery Method): nasal cannula, high flow  O2 Flow (L/min): 60  O2 Concentration (%): 90    PHYSICAL EXAM:  Gen: chronically ill appearing   HEENT: Normocephalic, atraumatic  Neck: supple, no lymphadenopathy  CV: Regular rate & regular rhythm  Lungs: decreased BS at bases, no fremitus  Abdomen: Soft, BS present  Ext: Warm, well perfused  Neuro: non focal, not following commands  Skin: no rash, no erythema  Lines: no phlebitis     FH: Non-contributory  Social Hx: Non-contributory    TESTS & MEASUREMENTS:                        7.9    9.41  )-----------( 617      ( 16 Feb 2024 05:30 )             25.9     02-16    138  |  98  |  14  ----------------------------<  112<H>  5.0   |  31  |  <0.5<L>    Ca    8.4      16 Feb 2024 05:30  Mg     2.4     02-15          Urinalysis Basic - ( 16 Feb 2024 05:30 )    Color: x / Appearance: x / SG: x / pH: x  Gluc: 112 mg/dL / Ketone: x  / Bili: x / Urobili: x   Blood: x / Protein: x / Nitrite: x   Leuk Esterase: x / RBC: x / WBC x   Sq Epi: x / Non Sq Epi: x / Bacteria: x        Culture - Blood (collected 02-12-24 @ 11:17)  Source: .Blood None  Preliminary Report (02-15-24 @ 21:00):    No growth at 72 Hours    Culture - Blood (collected 02-09-24 @ 11:57)  Source: .Blood None  Final Report (02-14-24 @ 22:00):    No growth at 5 days    Culture - Blood (collected 02-09-24 @ 11:57)  Source: .Blood None  Final Report (02-14-24 @ 22:00):    No growth at 5 days    Culture - Blood (collected 02-03-24 @ 11:13)  Source: .Blood None  Final Report (02-08-24 @ 20:00):    No growth at 5 days    Culture - Blood (collected 02-01-24 @ 11:58)  Source: .Blood None  Final Report (02-06-24 @ 23:06):    No growth at 5 days    Culture - Urine (collected 02-01-24 @ 11:40)  Source: Clean Catch Clean Catch (Midstream)  Final Report (02-03-24 @ 00:06):    >=3 organisms. Probable collection contamination.    Culture - Blood (collected 01-31-24 @ 18:21)  Source: .Blood None  Final Report (02-06-24 @ 01:01):    No growth at 5 days    Culture - Blood (collected 01-31-24 @ 18:21)  Source: .Blood None  Final Report (02-06-24 @ 01:01):    No growth at 5 days    Culture - Urine (collected 01-23-24 @ 05:20)  Source: Clean Catch Clean Catch (Midstream)  Final Report (01-25-24 @ 00:26):    No growth    Culture - Blood (collected 01-22-24 @ 16:51)  Source: .Blood None  Final Report (01-28-24 @ 01:00):    No growth at 5 days    Culture - Urine (collected 01-21-24 @ 05:00)  Source: Clean Catch Clean Catch (Midstream)  Final Report (01-22-24 @ 07:15):    No growth    Culture - Blood (collected 01-20-24 @ 16:15)  Source: .Blood Blood-Peripheral  Final Report (01-25-24 @ 23:00):    No growth at 5 days    Culture - Blood (collected 01-20-24 @ 16:15)  Source: .Blood Blood-Peripheral  Gram Stain (01-21-24 @ 20:33):    Growth in aerobic bottle: Gram positive cocci in pairs  Final Report (01-22-24 @ 19:04):    Growth in aerobic bottle: Staphylococcus hominis    Isolation of Coagulase negative Staphylococcus from single blood culture    sets may represent    contamination. Contact the Microbiology Department at [phone] if    susceptibility testing is    clinically indicated.    Direct identification is available within approximately 3-5    hours either by Blood Panel Multiplexed PCR or Direct    MALDI-TOF. Details: [email or web address]  Organism: Blood Culture PCR (01-22-24 @ 19:04)  Organism: Blood Culture PCR (01-22-24 @ 19:04)      -  Coagulase negative Staphylococcus: Detec      Method Type: PCR            INFECTIOUS DISEASES TESTING  MRSA PCR Result.: Negative (02-15-24 @ 06:20)  Procalcitonin, Serum: 0.10 (02-12-24 @ 11:17)  Rapid RVP Result: NotDetec (02-12-24 @ 10:28)  MRSA PCR Result.: Negative (02-12-24 @ 10:28)  Fungitell: 63 (02-06-24 @ 11:27)  Fungitell: 65 (02-06-24 @ 04:43)  Rapid RVP Result: NotDetec (02-04-24 @ 10:28)  MRSA PCR Result.: Negative (02-03-24 @ 21:32)  Procalcitonin, Serum: 0.15 (02-03-24 @ 11:13)  Procalcitonin, Serum: 0.22 (02-01-24 @ 16:00)  MRSA PCR Result.: Negative (01-22-24 @ 05:30)  Streptococcus pneumoniae Ag, Ur Result: Negative (01-21-24 @ 05:00)  Legionella Antigen, Urine: Negative (01-21-24 @ 05:00)  Rapid RVP Result: Detected (01-21-24 @ 00:58)  Procalcitonin, Serum: 0.51 (01-20-24 @ 21:23)  Fungitell: 325 (01-20-24 @ 21:23)  Vancomycin Level, Trough: 5.9 (11-12-23 @ 17:55)  Fungitell: 138 (11-07-23 @ 08:08)  Fungitell: 115 (11-02-23 @ 11:40)  Procalcitonin, Serum: 0.04 (11-02-23 @ 11:40)  Procalcitonin, Serum: 0.26 (10-24-23 @ 11:00)  MRSA PCR Result.: Negative (10-17-23 @ 17:20)  Fungitell: >500 (10-17-23 @ 17:20)  Procalcitonin, Serum: 4.36 (10-17-23 @ 17:20)  Procalcitonin, Serum: 4.18 (10-17-23 @ 12:35)  Procalcitonin, Serum: 0.07 (10-15-23 @ 07:28)  COVID-19 PCR: NotDetec (10-12-23 @ 09:14)  Procalcitonin, Serum: 0.40 (10-07-23 @ 10:54)  Streptococcus pneumoniae Ag, Ur Result: Negative (09-30-23 @ 14:36)  Legionella Antigen, Urine: Negative (09-30-23 @ 14:36)  MRSA PCR Result.: Negative (09-29-23 @ 16:50)  Procalcitonin, Serum: 9.78 (08-12-23 @ 11:25)  MRSA PCR Result.: Negative (08-12-23 @ 06:10)  Rapid RVP Result: NotDetec (08-12-23 @ 01:33)  Procalcitonin, Serum: 0.63 (08-10-23 @ 08:46)  Procalcitonin, Serum: 7.82 (08-04-23 @ 16:13)  MRSA PCR Result.: Negative (08-04-23 @ 14:52)  Rapid RVP Result: NotDetec (08-04-23 @ 03:00)  strept    INFLAMMATORY MARKERS  Sedimentation Rate, Erythrocyte: 100 mm/Hr (02-03-24 @ 11:13)  C-Reactive Protein, Serum: 214.2 mg/L (02-03-24 @ 11:13)  C-Reactive Protein, Serum: 132.3 mg/L (02-01-24 @ 16:00)  Sedimentation Rate, Erythrocyte: 67 mm/Hr (10-15-23 @ 07:28)      RADIOLOGY & ADDITIONAL TESTS:  I have personally reviewed the last available Chest xray  CXR      CT      CARDIOLOGY TESTING      MEDICATIONS  acetylcysteine 20%  Inhalation 4 Inhalation every 4 hours  albuterol/ipratropium for Nebulization 3 Nebulizer every 4 hours  artificial  tears Solution 1 Both EYES two times a day  calcium carbonate   1250 mG (OsCal) 1 Oral two times a day  caspofungin IVPB     caspofungin IVPB 50 IV Intermittent every 24 hours  chlorhexidine 2% Cloths 1 Topical daily  enoxaparin Injectable 50 SubCutaneous every 12 hours  gabapentin 300 Oral every 12 hours  levETIRAcetam  Solution 500 Oral every 12 hours  levoFLOXacin  Tablet 500 Oral every 24 hours  melatonin 3 Oral at bedtime  meropenem  IVPB 1000 IV Intermittent every 8 hours  midodrine. 20 Oral three times a day  pantoprazole  Injectable 40 IV Push every 24 hours  polyethylene glycol 3350 17 Oral at bedtime  raloxifene 60 Oral daily  saccharomyces boulardii 250 Oral two times a day  senna 2 Oral at bedtime  silver sulfADIAZINE 1% Cream 1 Topical two times a day  sodium chloride 0.65% Nasal 2 Both Nostrils three times a day  sodium chloride 3%  Inhalation 4 Inhalation every 4 hours      WEIGHT  Weight (kg): 52.3 (01-21-24 @ 08:00)  Creatinine: <0.5 mg/dL (02-16-24 @ 05:30)      ANTIBIOTICS:  caspofungin IVPB      caspofungin IVPB 50 milliGRAM(s) IV Intermittent every 24 hours  levoFLOXacin  Tablet 500 milliGRAM(s) Oral every 24 hours  meropenem  IVPB 1000 milliGRAM(s) IV Intermittent every 8 hours      All available historical records have been reviewed

## 2024-02-16 NOTE — PROGRESS NOTE ADULT - ATTENDING COMMENTS
IMPRESSION:    Acute hypoxemic respiratory failure  Likely aspiration pneumonia   Sepsis POA  Septic shock off Levophed   Recurrent aspiration pneumonia / prior intubation  HO DVT on Eliquis   HO GI bleed  HO OM  HO recent duodenal perforation   HO polymicrobial bacteremia   H/o CP, DS  H/o seizures    Plan as outlined above

## 2024-02-16 NOTE — CHART NOTE - NSCHARTNOTEFT_GEN_A_CORE
Registered Dietitian Follow-Up     Patient Profile Reviewed                           Yes [x]   No []     Nutrition History Previously Obtained        Yes [x]  No []       Pertinent Subjective Information: The patient tolerates current TF regimen.      Pertinent Medical Interventions: Pt presented to ED for respiratory distress and high grade fever. Patient found to be septic on admission. Admitted to SDU for management of acute respiratory distress 2/2 CAP/Aspiration PNA (2024 18:22)  While pt was on the floor she developed dyspnea after swallow evaluation, was spiking high grade fever, consulted by pulmonary/critical care and was upgraded back to SDU. Failed FEES, s/p  NG tube for feedings and medications, patient will need PEG tube once improved. Plan to recall GI once off HFNC.    Hx of Dysphagia - Puree Diet at baseline -     PMH includes Down Syndrome, nonverbal at baseline, Hypothyroidism, Cerebral palsy and Seizure Disorders.    Diet order: Jevity 1.2 at 300 mL q6hrs with 75 mL pre & post flushes q6hrs & Prosource No Carb once daily. Tube feed regimen at goal provides 1500 kcal, 82 g protein, 1572 mL free H2O.     Anthropometrics:  - Ht: 136.4 cm.  - Wt: 52.3kg  - BMI: 28.1  - IBW: 41kg    Daily Weight in k (-16), Weight in k (-15), Weight in k (-14), Weight in k (-13), Weight in k.3 (-12), Weight in k.3 (-), Weight in k (02-10)     MEDICATIONS  (STANDING):  acetylcysteine 20%  Inhalation 4 milliLiter(s) Inhalation every 4 hours  albuterol/ipratropium for Nebulization 3 milliLiter(s) Nebulizer every 4 hours  artificial  tears Solution 1 Drop(s) Both EYES two times a day  calcium carbonate   1250 mG (OsCal) 1 Tablet(s) Oral two times a day  caspofungin IVPB 50 milliGRAM(s) IV Intermittent every 24 hours  caspofungin IVPB      chlorhexidine 2% Cloths 1 Application(s) Topical daily  enoxaparin Injectable 50 milliGRAM(s) SubCutaneous every 12 hours  gabapentin 300 milliGRAM(s) Oral every 12 hours  levETIRAcetam  Solution 500 milliGRAM(s) Oral every 12 hours  levoFLOXacin  Tablet 500 milliGRAM(s) Oral every 24 hours  melatonin 3 milliGRAM(s) Oral at bedtime  midodrine. 20 milliGRAM(s) Oral three times a day  pantoprazole  Injectable 40 milliGRAM(s) IV Push every 24 hours  polyethylene glycol 3350 17 Gram(s) Oral at bedtime  raloxifene 60 milliGRAM(s) Oral daily  saccharomyces boulardii 250 milliGRAM(s) Oral two times a day  senna 2 Tablet(s) Oral at bedtime  silver sulfADIAZINE 1% Cream 1 Application(s) Topical two times a day  sodium chloride 0.65% Nasal 2 Spray(s) Both Nostrils three times a day  sodium chloride 3%  Inhalation 4 milliLiter(s) Inhalation every 4 hours    MEDICATIONS  (PRN):  acetaminophen     Tablet .. 650 milliGRAM(s) Oral every 6 hours PRN Temp greater or equal to 38C (100.4F), Mild Pain (1 - 3)  aluminum hydroxide/magnesium hydroxide/simethicone Suspension 30 milliLiter(s) Oral every 4 hours PRN Dyspepsia  ondansetron Injectable 4 milliGRAM(s) IV Push every 8 hours PRN Nausea and/or Vomiting    Pertinent Labs:  @ 05:30: Na 138, BUN 14, Cr <0.5<L>, <H>, K+ 5.0    Finger Sticks:  POCT Blood Glucose.: 277 mg/dL ( @ 21:40)     Physical Findings:  - Appearance: alert, non-verbal  - GI function: last BM ; no GI issues reported  - Tubes: NG Tube   - Oral/Mouth cavity:  SLP eval notes "PO trials remain unsafe 2' poor respiratory status and increased risk of aspiration" Recommends continue NPO with NG tube.  - Skin: sacral fissure, L lateral foot wound, wound to L 5th toe, R heel unstageable, L heel blister, L lateral foot stage 2 pressure injury; no edema noted at this time     Nutrition Requirements  Weight Used: 52.3kg - Derived from nutrition note ()      Estimated Energy Needs    Continue [x]  Adjust []  1307-1493kcal/day (MSJ x 1.4-1.6)     Estimated Protein Needs    Continue [x]  Adjust []  68-78gm/day (1.3-1.5gm/kg)     Estimated Fluid Needs        Continue [x]  Adjust []  1308-1569mL/day (25-30mL/kg)     Nutrient Intake: Current tube feed regimen at goal provides 1500 kcal, 82 g protein, 1572 mL free H2O. Receiving tube feed regimen at goal as per staff. Provides 101% kcal & 105% protein needs at goal.     Previous Nutrition Diagnosis: Increased nutrient needs (ongoing)     Nutrition Education: Reviewed tube feed regimen.     Goal/Expected Outcome: Pt to maintain tolerance to diet order, meeting % nutrient needs over next 5-7 days.    Pt at moderate nutrition risk; RD to follow-up in 5-7 days.     Indicator/Monitoring: Skin, labs, BM, wt, nutrition focused physical findings, body composition, tube feeds, swallow function, diet order.    Recommendations: Continue providing Jevity 1.2 at 300 mL q6hrs with 75 mL pre & post flushes q6hrs & Prosource No Carb once daily. Tube feed regimen at goal provides 1500 kcal, 82 g protein, 1572 mL free H2O.

## 2024-02-16 NOTE — PROGRESS NOTE ADULT - ASSESSMENT
IMPRESSION:    Acute hypoxemic respiratory failure  Likely aspiration pneumonia   Sepsis POA  Septic shock off Levophed   Recurrent aspiration pneumonia / prior intubation  HO DVT on Eliquis   HO GI bleed  HO OM  HO recent duodenal perforation   HO polymicrobial bacteremia   H/o CP, DS  H/o seizures    PLAN:    CNS:  Avoid CNS Depressant, AED. MS at baseline.     HEENT: Oral care.     PULMONARY: HOB at 45 degrees.  Aspiration precaution. Wean FiO2 Keep spo2 more than 92%.  Daily CXR. Aggressive pulm toilet, frequent suctioning.  Might need MV.    CARDIOVASCULAR: Keep MAP more than 60. Avoid overload. off levophed. C/w midodrine 20mg.  Goal directed fluid resuscitation.    GI: Protonix. NG  feeding. Speech and swallow recs noted. Might need PEG when more stable    INFECTIOUS DISEASE:   c/w Meropenem.  Finish empiric Caspo course 2/18. F/u blood Cx, serum galactomannan. Histo Ag negative, ID follow up    HEMATOLOGICAL:  Lovenox therapeutic.  Monitor CBC     ENDOCRINE:  Follow up FS.  Insulin protocol if needed.    MUSCULOSKELETAL: Bedrest.  Off loading.  Wound care.      Prognosis very poor.    Palliative care following    SDU.

## 2024-02-16 NOTE — PROGRESS NOTE ADULT - ASSESSMENT
58F w/ PMHx Down Syndrome, nonverbal at baseline, Hypothyroidism, Cerebral palsy and Seizure Disorders presents to the ED from nursing facility/group home ([facility]) presented to ED for respiratory distress and high grade fever. Patient found to be septic on admission. Admitted to SDU for management of acute respiratory distress 2/2 CAP/Aspiration PNA (20 Jan 2024 18:22)  While pt was on the floor she developed dyspnea after swallow evaluation, was spiking high grade fever, consulted by pulmonary/critical care and was upgraded back to SDU.    #Sepsis POA (Febrile, Tachycardic, Leukocytosis)  #Acute Hypoxic Respiratory Failure secondary to Aspiration PNA / RSV  #Hx of Dysphagia - Puree Diet at baseline  #Left lung almost complete collapse on 02.12.2024- improving  - On admission: VBG Lactate 2.1 improved to wnl, procal 0.04 pH wnl and CO2 mildly elevated 61  - inflammatory markers elevated, RVP is RSV +ve  - MRSA swab neg, urine strep neg, urine legionella neg  - D-dimer 605 and LE duplex neg  - BCx (1/20): Staph hominis -  contaminant repeat BCx have been NGTD  - UCx neg  - Failed FEES, s/p  NG tube for feedings and medications, patient will need PEG tube once improved. Recall GI once off HFNC.  - Aspiration precautions, Chest PT vest , c/w nebs  - ID is following, recommendations noted:   - Repeat fungitell 63, indeterminate (Fungitell chronically positive, received Caspofungin on previous admission)  - c/w  Caspo 50mg q24h IV till 02.18  - s/p 2 weeks course of meropenem last day 02.15. May need to be restarted based on clinical course. Close follow up.  - added - PO levaquin 500mg daily x 7 days end 2/21  - repeat procal 0.1 (02.12.2024 trending down)  - Removed RUE PIV, phlebitis on 02.12.2024  - DC Vanco per ID as MRSA is negative  -  histoplasma Ab serum negative   - needs CT A/P once more stable   - on HFNC/NRB Keep spo2 more than 92%.   - off levophed since yesterday; on midodrine 20 Q8H. Target MAP 60-65mmHg.    #Hypernatremia - improved, cw free water to 250 Q4H   #H/o hypotension  - Midodrine 20 mg Q 8 hours    - keep MAP above 60     #Elevated Troponin (Stabilized)  #Sinus Tachycardia (Controlled)  - Elevated Trops and Tachycardia (sepsis)    - Troponins: 25>37>35>35  - c/w telemetry monitoring   - Repeat EKG: Normal sinus rhythm  - d/c Lopressor 12.5mg BID    #Hx of Right Foot OM (1st Toe Stump and 2nd Distal Phalanx)  #Hx of Multiple Left Foot DTIs  #Hx of Sacral Wound  - Previous wound cultures positive for Proteus and ESBL E coli  - Patient underwent excisional debridement of ulcer of right foot (including 1st metatarsal) and partial amputation 2nd toe on previous admissions  - No acute surgical interventions from podiatry and burn  - C/w q2hr repositioning  - C/w local wound care  - Podiatry recalled 2/5 for foot wounds, updated recs placed    #Seizure Disorder  - c/w  Keppra 500 BID  - seizure precautions  - keep Mg above 2.0     #Hx of DVT of L Common Femoral Vein  - LE Duplex negative for DVT  - C/w Lovenox 50mg q12hr, HOLDING ELIQUIS    #Acute on Chronic Iron Deficiency Anemia (Stable)  - On admission: HgB 8.9 (previously 9.5)  - No evidence of hemorrhage  - B12 and Folate WNL normal  - Total Iron 15, Iron Saturation 6%, Ferritin 128 reflecting potential TIFFANI  - Hold off on iron supplementation in setting of infection  - Keep HgB >7    #Down Syndrome/  Cerebral Palsy  - supportive care  - prevent falls and aspiration   - failed speech and swallow, needs PEG as above  - C/w Gabapentin 300mg BID  - C/w Raloxifene 60mg QD  - met with [facility] on 02.14.2024: HFNC isn't sustainable on long term basis; Per [facility] if patient doesn't have terminal diagnosis (which isn't the case) they will opt for trach and long term Vent if needed. Patient will remain full code. No tracheostomy is indicated at this moment per pulm. Will update [facility] if status changes.       Full code, overall prognosis is very poor, continue monitoring in SDU  PENDING: CXR in am, CT chest AP once stable, PEG consent pending advisory board

## 2024-02-17 LAB
ANION GAP SERPL CALC-SCNC: 9 MMOL/L — SIGNIFICANT CHANGE UP (ref 7–14)
BASOPHILS # BLD AUTO: 0.09 K/UL — SIGNIFICANT CHANGE UP (ref 0–0.2)
BASOPHILS NFR BLD AUTO: 0.9 % — SIGNIFICANT CHANGE UP (ref 0–1)
BUN SERPL-MCNC: 16 MG/DL — SIGNIFICANT CHANGE UP (ref 10–20)
CALCIUM SERPL-MCNC: 8.4 MG/DL — SIGNIFICANT CHANGE UP (ref 8.4–10.5)
CHLORIDE SERPL-SCNC: 103 MMOL/L — SIGNIFICANT CHANGE UP (ref 98–110)
CO2 SERPL-SCNC: 32 MMOL/L — SIGNIFICANT CHANGE UP (ref 17–32)
CREAT SERPL-MCNC: 0.5 MG/DL — LOW (ref 0.7–1.5)
CULTURE RESULTS: SIGNIFICANT CHANGE UP
EGFR: 109 ML/MIN/1.73M2 — SIGNIFICANT CHANGE UP
EOSINOPHIL # BLD AUTO: 0.16 K/UL — SIGNIFICANT CHANGE UP (ref 0–0.7)
EOSINOPHIL NFR BLD AUTO: 1.6 % — SIGNIFICANT CHANGE UP (ref 0–8)
GLUCOSE SERPL-MCNC: 147 MG/DL — HIGH (ref 70–99)
HCT VFR BLD CALC: 25.6 % — LOW (ref 37–47)
HGB BLD-MCNC: 7.7 G/DL — LOW (ref 12–16)
IMM GRANULOCYTES NFR BLD AUTO: 0.5 % — HIGH (ref 0.1–0.3)
LYMPHOCYTES # BLD AUTO: 1.45 K/UL — SIGNIFICANT CHANGE UP (ref 1.2–3.4)
LYMPHOCYTES # BLD AUTO: 14.5 % — LOW (ref 20.5–51.1)
MCHC RBC-ENTMCNC: 23.9 PG — LOW (ref 27–31)
MCHC RBC-ENTMCNC: 30.1 G/DL — LOW (ref 32–37)
MCV RBC AUTO: 79.5 FL — LOW (ref 81–99)
MONOCYTES # BLD AUTO: 0.65 K/UL — HIGH (ref 0.1–0.6)
MONOCYTES NFR BLD AUTO: 6.5 % — SIGNIFICANT CHANGE UP (ref 1.7–9.3)
NEUTROPHILS # BLD AUTO: 7.63 K/UL — HIGH (ref 1.4–6.5)
NEUTROPHILS NFR BLD AUTO: 76 % — HIGH (ref 42.2–75.2)
NRBC # BLD: 0 /100 WBCS — SIGNIFICANT CHANGE UP (ref 0–0)
PLATELET # BLD AUTO: 662 K/UL — HIGH (ref 130–400)
PMV BLD: 10.5 FL — HIGH (ref 7.4–10.4)
POTASSIUM SERPL-MCNC: 4.7 MMOL/L — SIGNIFICANT CHANGE UP (ref 3.5–5)
POTASSIUM SERPL-SCNC: 4.7 MMOL/L — SIGNIFICANT CHANGE UP (ref 3.5–5)
PROCALCITONIN SERPL-MCNC: 0.11 NG/ML — HIGH (ref 0.02–0.1)
RBC # BLD: 3.22 M/UL — LOW (ref 4.2–5.4)
RBC # FLD: 21.5 % — HIGH (ref 11.5–14.5)
SODIUM SERPL-SCNC: 144 MMOL/L — SIGNIFICANT CHANGE UP (ref 135–146)
SPECIMEN SOURCE: SIGNIFICANT CHANGE UP
WBC # BLD: 10.03 K/UL — SIGNIFICANT CHANGE UP (ref 4.8–10.8)
WBC # FLD AUTO: 10.03 K/UL — SIGNIFICANT CHANGE UP (ref 4.8–10.8)

## 2024-02-17 PROCEDURE — 71045 X-RAY EXAM CHEST 1 VIEW: CPT | Mod: 26

## 2024-02-17 PROCEDURE — 99232 SBSQ HOSP IP/OBS MODERATE 35: CPT | Mod: GC

## 2024-02-17 PROCEDURE — 99233 SBSQ HOSP IP/OBS HIGH 50: CPT

## 2024-02-17 RX ORDER — SODIUM CHLORIDE 9 MG/ML
250 INJECTION, SOLUTION INTRAVENOUS ONCE
Refills: 0 | Status: COMPLETED | OUTPATIENT
Start: 2024-02-17 | End: 2024-02-17

## 2024-02-17 RX ORDER — ACETAMINOPHEN 500 MG
650 TABLET ORAL ONCE
Refills: 0 | Status: COMPLETED | OUTPATIENT
Start: 2024-02-17 | End: 2024-02-17

## 2024-02-17 RX ADMIN — RALOXIFENE HYDROCHLORIDE 60 MILLIGRAM(S): 60 TABLET, COATED ORAL at 12:11

## 2024-02-17 RX ADMIN — CHLORHEXIDINE GLUCONATE 1 APPLICATION(S): 213 SOLUTION TOPICAL at 12:22

## 2024-02-17 RX ADMIN — Medication 1 TABLET(S): at 05:41

## 2024-02-17 RX ADMIN — Medication 650 MILLIGRAM(S): at 19:32

## 2024-02-17 RX ADMIN — Medication 250 MILLIGRAM(S): at 17:57

## 2024-02-17 RX ADMIN — Medication 3 MILLILITER(S): at 07:38

## 2024-02-17 RX ADMIN — MIDODRINE HYDROCHLORIDE 20 MILLIGRAM(S): 2.5 TABLET ORAL at 12:11

## 2024-02-17 RX ADMIN — Medication 3 MILLILITER(S): at 12:19

## 2024-02-17 RX ADMIN — Medication 4 MILLILITER(S): at 07:39

## 2024-02-17 RX ADMIN — Medication 2 SPRAY(S): at 13:32

## 2024-02-17 RX ADMIN — MIDODRINE HYDROCHLORIDE 20 MILLIGRAM(S): 2.5 TABLET ORAL at 05:42

## 2024-02-17 RX ADMIN — Medication 1 APPLICATION(S): at 18:00

## 2024-02-17 RX ADMIN — MIDODRINE HYDROCHLORIDE 20 MILLIGRAM(S): 2.5 TABLET ORAL at 17:50

## 2024-02-17 RX ADMIN — Medication 650 MILLIGRAM(S): at 19:02

## 2024-02-17 RX ADMIN — Medication 250 MILLIGRAM(S): at 05:41

## 2024-02-17 RX ADMIN — GABAPENTIN 300 MILLIGRAM(S): 400 CAPSULE ORAL at 05:42

## 2024-02-17 RX ADMIN — ENOXAPARIN SODIUM 50 MILLIGRAM(S): 100 INJECTION SUBCUTANEOUS at 17:51

## 2024-02-17 RX ADMIN — SODIUM CHLORIDE 4 MILLILITER(S): 9 INJECTION INTRAMUSCULAR; INTRAVENOUS; SUBCUTANEOUS at 21:13

## 2024-02-17 RX ADMIN — LEVETIRACETAM 500 MILLIGRAM(S): 250 TABLET, FILM COATED ORAL at 05:42

## 2024-02-17 RX ADMIN — Medication 3 MILLILITER(S): at 15:44

## 2024-02-17 RX ADMIN — PANTOPRAZOLE SODIUM 40 MILLIGRAM(S): 20 TABLET, DELAYED RELEASE ORAL at 05:42

## 2024-02-17 RX ADMIN — Medication 4 MILLILITER(S): at 12:19

## 2024-02-17 RX ADMIN — GABAPENTIN 300 MILLIGRAM(S): 400 CAPSULE ORAL at 17:59

## 2024-02-17 RX ADMIN — Medication 650 MILLIGRAM(S): at 05:50

## 2024-02-17 RX ADMIN — ENOXAPARIN SODIUM 50 MILLIGRAM(S): 100 INJECTION SUBCUTANEOUS at 05:41

## 2024-02-17 RX ADMIN — Medication 650 MILLIGRAM(S): at 15:55

## 2024-02-17 RX ADMIN — Medication 1 TABLET(S): at 17:56

## 2024-02-17 RX ADMIN — Medication 2 SPRAY(S): at 21:13

## 2024-02-17 RX ADMIN — Medication 4 MILLILITER(S): at 15:45

## 2024-02-17 RX ADMIN — Medication 3 MILLIGRAM(S): at 21:14

## 2024-02-17 RX ADMIN — Medication 2 SPRAY(S): at 05:42

## 2024-02-17 RX ADMIN — Medication 1 DROP(S): at 18:30

## 2024-02-17 RX ADMIN — CASPOFUNGIN ACETATE 260 MILLIGRAM(S): 7 INJECTION, POWDER, LYOPHILIZED, FOR SOLUTION INTRAVENOUS at 12:26

## 2024-02-17 RX ADMIN — Medication 1 DROP(S): at 05:42

## 2024-02-17 RX ADMIN — LEVETIRACETAM 500 MILLIGRAM(S): 250 TABLET, FILM COATED ORAL at 17:51

## 2024-02-17 RX ADMIN — Medication 650 MILLIGRAM(S): at 16:25

## 2024-02-17 RX ADMIN — Medication 3 MILLILITER(S): at 21:26

## 2024-02-17 RX ADMIN — Medication 1 APPLICATION(S): at 05:42

## 2024-02-17 NOTE — PROGRESS NOTE ADULT - ASSESSMENT
58F w/ PMHx Down Syndrome, nonverbal at baseline, Hypothyroidism, Cerebral palsy and Seizure Disorders presents to the ED from nursing facility/group home ([facility]) presented to ED for respiratory distress and high grade fever. Patient found to be septic on admission.Admitted to SDU for management of acute respiratory distress 2/2 CAP/Aspiration PNA (20 Jan 2024 18:22)  While pt was on the floor she developed dyspnea after swallow evaluation, was spiking high grade fever, consulted by pulmonary/critical care and was upgraded back to SDU.    A/P  # Sepsis POA / Acute hypoxic resp failure / RSV bronchiolitis /  H/o Dysphagia/ suspected aspiration PNA /high grade fever   - CXR noted,   likely resolving mucous plug  - aggressive chest PT with vest, aspiration precautions and suction   - ID is following, recommendations noted:  - completed the course of   meropenem  15 days on 2/16  - pt is spiking high grade fever, needs ID follow up  - repeat sepsis work up, full RVP   - c/w Levofloxacin 500 mg Q 24 hours  as per ID   - Fungitell started downtrending  after Caspo started  - Continue caspofungin 50mg q24h IV , will likely need empiric 14 day course. 2/18  - pt has no risk factors for PCP or other invasive fungal infection   - CT Chest, AP when stable   - MRSA negative   - BCx (1/20): Staph hominis -  contaminant repeat BCx have been NGTD  - Failed FEES, put  NG tube for feedings and medications, pt'll likely need PEG, consulted by GI   - Aspiration precautions, order chest PT vest   - Scopolamine patch d/c on 2/14  - supplement oxygen, monitor pulse Ox   - c/w duoneb  - pulmonary is following, recommendations noted:   - Wean FiO2 Keep spo2 more than 92%.  Daily CXR. Aggressive pulm toilet, frequent suctioning.  Might need MV.    # Hypotension  - c/w   Midodrine  20 mg Q 8 hours   - off pressure support now, stable   - keep MAP above 60     # Elevated Troponin , type 2 MI/  Sinus tachycardia  - c/w telemetry monitoring   - Repeat EKG: Normal sinus rhythm  - c/w  metoprolol  -  maintain fluid balance     # Seizure Disorder  - c/w  Keppra   - seizure precautions  - keep Mg above 2.0     # H/o lower ext DVT  - therapeutic Lovenox recommended by ICU team  - Eliquis held     # Hypothyroidism  - Thyroid Stimulating Hormone, Serum: 0.51 uIU/mL (01.21.24 @ 06:34)  - check FT4     # Normocytic Anemia   - monitor H/H, keep Hb above 7.5   - h/H trending down   - no active bleeding noted ( pt is on full AC)   - send anemia work up     # Down Syndrome/  Cerebral Palsy  - supportive care  - prevent falls and aspiration   - failed speech and swallow, needs PEG   - on Raloxifene     # Full code    # GI prophylaxis       Pending (specify): repeat sepsis work up, repeat full respiratory panel, ID follow up ( pt is spiking high grade fever), send anemia work up, keep Hb above 7.5,   pulmonary toilet, chest PT vest,  frequent suction,  c/w  Caspofungin, c/w Levofloxacin,   supportive care, pt needs PEG, daily CXR   overall prognosis is very poor

## 2024-02-17 NOTE — PROGRESS NOTE ADULT - SUBJECTIVE AND OBJECTIVE BOX
Patient is a 58y old  Female who presents with a chief complaint of Respiratory Distress (16 Feb 2024 17:48)        Over Night Events:    on HFNC 51 L 87% sating 98%  Off pressors  Febrile 101.7 last night    ROS:     All ROS are negative except HPI       PHYSICAL EXAM    ICU Vital Signs Last 24 Hrs  T(C): 37.3 (17 Feb 2024 04:45), Max: 38.7 (16 Feb 2024 20:00)  T(F): 99.2 (17 Feb 2024 04:45), Max: 101.7 (16 Feb 2024 20:00)  HR: 118 (17 Feb 2024 04:00) (76 - 133)  BP: 103/56 (17 Feb 2024 04:00) (82/51 - 115/55)  BP(mean): 62 (16 Feb 2024 12:45) (62 - 62)  ABP: --  ABP(mean): --  RR: 18 (17 Feb 2024 04:00) (18 - 22)  SpO2: 100% (17 Feb 2024 04:00) (94% - 100%)    O2 Parameters below as of 17 Feb 2024 00:00  Patient On (Oxygen Delivery Method): nasal cannula, high flow          CONSTITUTIONAL:  Ill appearing     CARDIAC:   Normal rate,   Regular rhythm.    No edema    RESPIRATORY:   No wheezing  Bilateral BS  Decreased BS noted    GASTROINTESTINAL:  Abdomen soft,   Non-tender,   No guarding,     MUSCULOSKELETAL:   Range of motion is not limited,  No clubbing, cyanosis    NEUROLOGICAL:   alert, arousable    SKIN:   Heel and sacrum friction wound        02-16-24 @ 07:01  -  02-17-24 @ 07:00  --------------------------------------------------------  IN:    Enteral Tube Flush: 550 mL    Jevity 1.2: 1200 mL  Total IN: 1750 mL    OUT:    Voided (mL): 0 mL  Total OUT: 0 mL    Total NET: 1750 mL          LABS:                            7.9    9.41  )-----------( 617      ( 16 Feb 2024 05:30 )             25.9                                               02-17    144  |  103  |  16  ----------------------------<  147<H>  4.7   |  32  |  0.5<L>    Ca    8.4      17 Feb 2024 06:52                                               Urinalysis Basic - ( 17 Feb 2024 06:52 )    Color: x / Appearance: x / SG: x / pH: x  Gluc: 147 mg/dL / Ketone: x  / Bili: x / Urobili: x   Blood: x / Protein: x / Nitrite: x   Leuk Esterase: x / RBC: x / WBC x   Sq Epi: x / Non Sq Epi: x / Bacteria: x                      MEDICATIONS  (STANDING):  acetylcysteine 20%  Inhalation 4 milliLiter(s) Inhalation every 4 hours  albuterol/ipratropium for Nebulization 3 milliLiter(s) Nebulizer every 4 hours  artificial  tears Solution 1 Drop(s) Both EYES two times a day  calcium carbonate   1250 mG (OsCal) 1 Tablet(s) Oral two times a day  caspofungin IVPB      caspofungin IVPB 50 milliGRAM(s) IV Intermittent every 24 hours  chlorhexidine 2% Cloths 1 Application(s) Topical daily  enoxaparin Injectable 50 milliGRAM(s) SubCutaneous every 12 hours  gabapentin 300 milliGRAM(s) Oral every 12 hours  levETIRAcetam  Solution 500 milliGRAM(s) Oral every 12 hours  levoFLOXacin  Tablet 500 milliGRAM(s) Oral every 24 hours  melatonin 3 milliGRAM(s) Oral at bedtime  midodrine. 20 milliGRAM(s) Oral three times a day  pantoprazole  Injectable 40 milliGRAM(s) IV Push every 24 hours  polyethylene glycol 3350 17 Gram(s) Oral at bedtime  raloxifene 60 milliGRAM(s) Oral daily  saccharomyces boulardii 250 milliGRAM(s) Oral two times a day  senna 2 Tablet(s) Oral at bedtime  silver sulfADIAZINE 1% Cream 1 Application(s) Topical two times a day  sodium chloride 0.65% Nasal 2 Spray(s) Both Nostrils three times a day  sodium chloride 3%  Inhalation 4 milliLiter(s) Inhalation every 4 hours    MEDICATIONS  (PRN):  acetaminophen     Tablet .. 650 milliGRAM(s) Oral every 6 hours PRN Temp greater or equal to 38C (100.4F), Mild Pain (1 - 3)  aluminum hydroxide/magnesium hydroxide/simethicone Suspension 30 milliLiter(s) Oral every 4 hours PRN Dyspepsia  ondansetron Injectable 4 milliGRAM(s) IV Push every 8 hours PRN Nausea and/or Vomiting

## 2024-02-17 NOTE — PROGRESS NOTE ADULT - ASSESSMENT
IMPRESSION:    Acute hypoxemic respiratory failure  Likely aspiration pneumonia   Sepsis POA  Septic shock off Levophed   Recurrent aspiration pneumonia / prior intubation  HO DVT on Eliquis   HO GI bleed  HO OM  HO recent duodenal perforation   HO polymicrobial bacteremia   H/o CP, DS  H/o seizures    PLAN:    CNS:  Avoid CNS Depressant, AED. MS at baseline.     HEENT: Oral care.     PULMONARY: HOB at 45 degrees.  Aspiration precaution. Wean FiO2 Keep spo2 more than 92%.  Daily CXR. Aggressive pulm toilet, frequent suctioning. Chest vest. Might need MV.     CARDIOVASCULAR: Keep MAP more than 60. Avoid overload. off levophed. C/w midodrine 20mg.  Goal directed fluid resuscitation.    GI: Protonix. NG  feeding. Speech and swallow recs noted. Might need PEG when more stable    INFECTIOUS DISEASE:   c/w Meropenem.  Finish empiric Caspo course 2/18. Repeat blood Cx, serum galactomannan. Histo Ag negative, ID follow up    HEMATOLOGICAL:  Lovenox therapeutic.  Monitor CBC     ENDOCRINE:  Follow up FS.  Insulin protocol if needed.    MUSCULOSKELETAL: Bedrest.  Off loading.  Wound care.      Prognosis very poor.    Palliative care following    SDU.

## 2024-02-17 NOTE — PROGRESS NOTE ADULT - ATTENDING COMMENTS
Ms. Linton was seen and examined at bedside today, patient continues to be severely ill of requiring high levels of high flow nasal cannula.  Patient is likely still aspirating in the setting of recurrent fever after recent completion of antibiotic course.  We will reengage ID and repeat septic workup, low threshold to resume antibiotics if patient should decompensate in any way.  I agree with the fellow note, with the exceptions listed in my attestation above.  The remainder of impression and plan per fellow note. Heart transplant, orthotopic, status

## 2024-02-17 NOTE — PROGRESS NOTE ADULT - SUBJECTIVE AND OBJECTIVE BOX
58F w/ PMHx Down Syndrome, nonverbal at baseline, Hypothyroidism, Cerebral palsy and Seizure Disorders presents to the ED from nursing facility/group home ([facility]) presented to ED for respiratory distress and high grade fever. Patient found to be septic on admission.Admitted to SDU for management of acute respiratory distress 2/2 CAP/Aspiration PNA (20 Jan 2024 18:22)  While pt was on the floor she developed dyspnea after swallow evaluation, was spiking high grade fever, consulted by pulmonary/critical care and was upgraded back to SDU.  Pt developed left lung white out, improved after aggressive chest PT, treated with Meropenem and caspofungin, ID is following, her overall prognosis is very poor, she might need trach and PEG in the nearest future , a meeting with facility staff and state took place on 2/14.   Pt completed course o Meropenem, Levofloxacin added on 2/15 ( pt spiked fever). she was tapered off pressure support, still on high flow oxygen. I last 48 hours Hb dropped without acute blood loss, will send anemia work up and monitor closely ( pt is o full AC).   Today pt is awake, looks anxious, nonverbal,  aid at the bedside.       PAST MEDICAL & SURGICAL HISTORY:  Down syndrome  Osteoporosis  Mild anemia  Neuropathy  S/P debridement of R hip on 3/2/21    Allergies  No Known Allergies    Vital Signs Last 24 Hrs  T(C): 37.3 (17 Feb 2024 04:45), Max: 38.7 (16 Feb 2024 20:00)  T(F): 99.2 (17 Feb 2024 04:45), Max: 101.7 (16 Feb 2024 20:00)  HR: 118 (17 Feb 2024 04:00) (76 - 133)  BP: 103/56 (17 Feb 2024 07:30) (82/51 - 115/55)  BP(mean): 77 (17 Feb 2024 07:30) (62 - 77)  RR: 18 (17 Feb 2024 07:30) (18 - 22)  SpO2: 99% (17 Feb 2024 07:30) (94% - 100%)    Parameters below as of 17 Feb 2024 07:15  Patient On (Oxygen Delivery Method): nasal cannula, high flow  O2 Flow (L/min): 60  O2 Concentration (%): 90      PHYSICAL EXAM:   GENERAL: NAD, nonverbal, awake   HEENT: atraumatic, EOMI.  Lungs: coarse BS   CVS: SIS2 +,  no murmur.  Abdomen/ GI:  Soft,  NT, ND, BS+  CNS: awake , non verbal, anxious   Ext: contracted  Skin: no rash, no ulcers.    LABs:                           7.7    10.03 )-----------( 662      ( 17 Feb 2024 06:52 )             25.6   02-17    144  |  103  |  16  ----------------------------<  147<H>  4.7   |  32  |  0.5<L>    Ca    8.4      17 Feb 2024 06:52    Parameters below as of 04 Feb 2024 08:47  Patient On (Oxygen Delivery Method): nasal cannula, high flow  O2 Flow (L/min): 60  O2 Concentration (%): 100  Urinalysis Basic - ( 01 Feb 2024 11:40 )    Color: Yellow / Appearance: Turbid / SG: >1.030 / pH: x  Gluc: x / Ketone: Negative mg/dL  / Bili: Negative / Urobili: 1.0 mg/dL   Blood: x / Protein: 100 mg/dL / Nitrite: Negative   Leuk Esterase: Negative / RBC: 10 /HPF / WBC 5 /HPF   Sq Epi: x / Non Sq Epi: 2 /HPF / Bacteria: Negative /HPF    Culture - Blood (01.31.24 @ 18:21)   Specimen Source: .Blood None  Culture Results:   No growth at 24 hoursCulture - Urine (01.23.24 @ 05:20)   Specimen Source: Clean Catch Clean Catch (Midstream)  Culture Results:   No growthCulture - Blood (01.20.24 @ 16:15)   - Coagulase negative Staphylococcus: Detec  Gram Stain:   Growth in aerobic bottle: Gram positive cocci in pairs  Specimen Source: .Blood Blood-Peripheral  Organism: Blood Culture PCR  Culture Results:   Growth in aerobic bottle: Staphylococcus hominis   Isolation of Coagulase negative Staphylococcus from single blood culture     sets may represent   contamination. Contact the Microbiology Department at [phone] if   susceptibility testing is   clinically indicated.   Direct identification is available within approximately 3-5   hours either by Blood Panel Multiplexed PCR or Direct   MALDI-TOF. Details: [email or web address]  Organism Identification: Blood Culture PCR  Method Type: PCR    Culture - Blood (02.12.24 @ 11:17)   Specimen Source: .Blood None  Culture Results:   No growth at 24 hours  RADIOLOGY:     < from: Xray Chest 1 View-PORTABLE IMMEDIATE (Xray Chest 1 View-PORTABLE IMMEDIATE .) (02.14.24 @ 08:11) >  IMPRESSION:    1. Enteric tube seen to the level of the stomach.  2. Bilateral opacities, worsened on the left    < end of copied text >  < from: VA Duplex Lower Ext Vein Scan, Bilat (02.11.24 @ 13:47) >    IMPRESSION:  No evidence of deep venous thrombosis in either lower extremity.    < end of copied text >      < from: VA Duplex Lower Ext Vein Scan, Bilat (01.22.24 @ 14:52) >  IMPRESSION:  No evidence of deep venous thrombosis in either lower extremity.    < end of copied text >  < from: TTE Echo Complete w/ Contrast w/o Doppler (01.22.24 @ 13:35) >  Summary:   1. Technically difficult and limited study.   2. Endocardial visualization was enhanced with intravenous echo contrast.   3. Left ventricular ejection fraction, by visual estimation, is >55%.   4. Normal global left ventricular systolic function.   5. The left ventricular diastolic function could not be assessed in this   study.  < from: Xray Chest 1 View- PORTABLE-Routine (Xray Chest 1 View- PORTABLE-Routine in AM.) (02.03.24 @ 06:48) >    Impression:    Stable bilateral opacities    < from: Xray Chest 1 View- PORTABLE-Routine (Xray Chest 1 View- PORTABLE-Routine in AM.) (02.17.24 @ 07:21) >  FINDINGS/  IMPRESSION:    Unchanged NG tube.    Increased right basal opacities. Left midlung opacities, increased. Left   basal consolidation, decreased. No pneumothorax. Stable cardiomediastinal   silhouette.    Unchanged osseous structures.    < end of copied text >      MEDICATIONS  (STANDING):  acetylcysteine 20%  Inhalation 4 milliLiter(s) Inhalation every 4 hours  albuterol/ipratropium for Nebulization 3 milliLiter(s) Nebulizer every 4 hours  artificial  tears Solution 1 Drop(s) Both EYES two times a day  calcium carbonate   1250 mG (OsCal) 1 Tablet(s) Oral two times a day  caspofungin IVPB 50 milliGRAM(s) IV Intermittent every 24 hours  caspofungin IVPB      chlorhexidine 2% Cloths 1 Application(s) Topical daily  enoxaparin Injectable 50 milliGRAM(s) SubCutaneous every 12 hours  gabapentin 300 milliGRAM(s) Oral every 12 hours  levETIRAcetam  Solution 500 milliGRAM(s) Oral every 12 hours  levoFLOXacin  Tablet 500 milliGRAM(s) Oral every 24 hours  melatonin 3 milliGRAM(s) Oral at bedtime  midodrine. 20 milliGRAM(s) Oral three times a day  pantoprazole  Injectable 40 milliGRAM(s) IV Push every 24 hours  polyethylene glycol 3350 17 Gram(s) Oral at bedtime  raloxifene 60 milliGRAM(s) Oral daily  saccharomyces boulardii 250 milliGRAM(s) Oral two times a day  senna 2 Tablet(s) Oral at bedtime  silver sulfADIAZINE 1% Cream 1 Application(s) Topical two times a day  sodium chloride 0.65% Nasal 2 Spray(s) Both Nostrils three times a day  sodium chloride 3%  Inhalation 4 milliLiter(s) Inhalation every 4 hours    MEDICATIONS  (PRN):  acetaminophen     Tablet .. 650 milliGRAM(s) Oral every 6 hours PRN Temp greater or equal to 38C (100.4F), Mild Pain (1 - 3)  aluminum hydroxide/magnesium hydroxide/simethicone Suspension 30 milliLiter(s) Oral every 4 hours PRN Dyspepsia  ondansetron Injectable 4 milliGRAM(s) IV Push every 8 hours PRN Nausea and/or Vomiting

## 2024-02-18 LAB
ANION GAP SERPL CALC-SCNC: 13 MMOL/L — SIGNIFICANT CHANGE UP (ref 7–14)
BUN SERPL-MCNC: 15 MG/DL — SIGNIFICANT CHANGE UP (ref 10–20)
CALCIUM SERPL-MCNC: 8.6 MG/DL — SIGNIFICANT CHANGE UP (ref 8.4–10.5)
CHLORIDE SERPL-SCNC: 98 MMOL/L — SIGNIFICANT CHANGE UP (ref 98–110)
CO2 SERPL-SCNC: 29 MMOL/L — SIGNIFICANT CHANGE UP (ref 17–32)
CREAT SERPL-MCNC: 0.5 MG/DL — LOW (ref 0.7–1.5)
EGFR: 109 ML/MIN/1.73M2 — SIGNIFICANT CHANGE UP
GLUCOSE SERPL-MCNC: 137 MG/DL — HIGH (ref 70–99)
HCT VFR BLD CALC: 27.7 % — LOW (ref 37–47)
HGB BLD-MCNC: 8.4 G/DL — LOW (ref 12–16)
MCHC RBC-ENTMCNC: 23.9 PG — LOW (ref 27–31)
MCHC RBC-ENTMCNC: 30.3 G/DL — LOW (ref 32–37)
MCV RBC AUTO: 78.7 FL — LOW (ref 81–99)
NRBC # BLD: 0 /100 WBCS — SIGNIFICANT CHANGE UP (ref 0–0)
PLATELET # BLD AUTO: 623 K/UL — HIGH (ref 130–400)
PMV BLD: 10.6 FL — HIGH (ref 7.4–10.4)
POTASSIUM SERPL-MCNC: 4.6 MMOL/L — SIGNIFICANT CHANGE UP (ref 3.5–5)
POTASSIUM SERPL-SCNC: 4.6 MMOL/L — SIGNIFICANT CHANGE UP (ref 3.5–5)
RAPID RVP RESULT: SIGNIFICANT CHANGE UP
RBC # BLD: 3.52 M/UL — LOW (ref 4.2–5.4)
RBC # FLD: 21.4 % — HIGH (ref 11.5–14.5)
SARS-COV-2 RNA SPEC QL NAA+PROBE: SIGNIFICANT CHANGE UP
SODIUM SERPL-SCNC: 140 MMOL/L — SIGNIFICANT CHANGE UP (ref 135–146)
WBC # BLD: 10.37 K/UL — SIGNIFICANT CHANGE UP (ref 4.8–10.8)
WBC # FLD AUTO: 10.37 K/UL — SIGNIFICANT CHANGE UP (ref 4.8–10.8)

## 2024-02-18 PROCEDURE — 99232 SBSQ HOSP IP/OBS MODERATE 35: CPT | Mod: GC

## 2024-02-18 PROCEDURE — 71045 X-RAY EXAM CHEST 1 VIEW: CPT | Mod: 26

## 2024-02-18 PROCEDURE — 99233 SBSQ HOSP IP/OBS HIGH 50: CPT

## 2024-02-18 RX ORDER — SODIUM CHLORIDE 9 MG/ML
1000 INJECTION INTRAMUSCULAR; INTRAVENOUS; SUBCUTANEOUS
Refills: 0 | Status: DISCONTINUED | OUTPATIENT
Start: 2024-02-18 | End: 2024-02-19

## 2024-02-18 RX ORDER — ACETAMINOPHEN 500 MG
1000 TABLET ORAL ONCE
Refills: 0 | Status: COMPLETED | OUTPATIENT
Start: 2024-02-18 | End: 2024-02-18

## 2024-02-18 RX ORDER — ACETAMINOPHEN 500 MG
975 TABLET ORAL ONCE
Refills: 0 | Status: COMPLETED | OUTPATIENT
Start: 2024-02-18 | End: 2024-02-18

## 2024-02-18 RX ORDER — MEROPENEM 1 G/30ML
1000 INJECTION INTRAVENOUS EVERY 8 HOURS
Refills: 0 | Status: COMPLETED | OUTPATIENT
Start: 2024-02-18 | End: 2024-02-25

## 2024-02-18 RX ADMIN — Medication 2 SPRAY(S): at 21:56

## 2024-02-18 RX ADMIN — Medication 250 MILLIGRAM(S): at 05:03

## 2024-02-18 RX ADMIN — ONDANSETRON 4 MILLIGRAM(S): 8 TABLET, FILM COATED ORAL at 21:56

## 2024-02-18 RX ADMIN — Medication 1 APPLICATION(S): at 05:06

## 2024-02-18 RX ADMIN — RALOXIFENE HYDROCHLORIDE 60 MILLIGRAM(S): 60 TABLET, COATED ORAL at 11:49

## 2024-02-18 RX ADMIN — Medication 4 MILLILITER(S): at 08:11

## 2024-02-18 RX ADMIN — MIDODRINE HYDROCHLORIDE 20 MILLIGRAM(S): 2.5 TABLET ORAL at 11:49

## 2024-02-18 RX ADMIN — SENNA PLUS 2 TABLET(S): 8.6 TABLET ORAL at 21:55

## 2024-02-18 RX ADMIN — Medication 4 MILLILITER(S): at 16:29

## 2024-02-18 RX ADMIN — SODIUM CHLORIDE 4 MILLILITER(S): 9 INJECTION INTRAMUSCULAR; INTRAVENOUS; SUBCUTANEOUS at 08:11

## 2024-02-18 RX ADMIN — Medication 3 MILLIGRAM(S): at 21:55

## 2024-02-18 RX ADMIN — LEVETIRACETAM 500 MILLIGRAM(S): 250 TABLET, FILM COATED ORAL at 05:02

## 2024-02-18 RX ADMIN — SODIUM CHLORIDE 75 MILLILITER(S): 9 INJECTION INTRAMUSCULAR; INTRAVENOUS; SUBCUTANEOUS at 18:51

## 2024-02-18 RX ADMIN — Medication 1 DROP(S): at 18:32

## 2024-02-18 RX ADMIN — Medication 1 TABLET(S): at 05:03

## 2024-02-18 RX ADMIN — ENOXAPARIN SODIUM 50 MILLIGRAM(S): 100 INJECTION SUBCUTANEOUS at 18:33

## 2024-02-18 RX ADMIN — Medication 3 MILLILITER(S): at 19:15

## 2024-02-18 RX ADMIN — GABAPENTIN 300 MILLIGRAM(S): 400 CAPSULE ORAL at 05:05

## 2024-02-18 RX ADMIN — Medication 250 MILLIGRAM(S): at 18:33

## 2024-02-18 RX ADMIN — Medication 1 TABLET(S): at 18:36

## 2024-02-18 RX ADMIN — POLYETHYLENE GLYCOL 3350 17 GRAM(S): 17 POWDER, FOR SOLUTION ORAL at 21:55

## 2024-02-18 RX ADMIN — MEROPENEM 100 MILLIGRAM(S): 1 INJECTION INTRAVENOUS at 21:56

## 2024-02-18 RX ADMIN — SODIUM CHLORIDE 4 MILLILITER(S): 9 INJECTION INTRAMUSCULAR; INTRAVENOUS; SUBCUTANEOUS at 12:59

## 2024-02-18 RX ADMIN — Medication 3 MILLILITER(S): at 16:29

## 2024-02-18 RX ADMIN — Medication 650 MILLIGRAM(S): at 23:54

## 2024-02-18 RX ADMIN — Medication 4 MILLILITER(S): at 19:15

## 2024-02-18 RX ADMIN — Medication 3 MILLILITER(S): at 08:11

## 2024-02-18 RX ADMIN — CHLORHEXIDINE GLUCONATE 1 APPLICATION(S): 213 SOLUTION TOPICAL at 11:49

## 2024-02-18 RX ADMIN — LEVETIRACETAM 500 MILLIGRAM(S): 250 TABLET, FILM COATED ORAL at 18:32

## 2024-02-18 RX ADMIN — MEROPENEM 100 MILLIGRAM(S): 1 INJECTION INTRAVENOUS at 13:52

## 2024-02-18 RX ADMIN — Medication 650 MILLIGRAM(S): at 05:03

## 2024-02-18 RX ADMIN — MIDODRINE HYDROCHLORIDE 20 MILLIGRAM(S): 2.5 TABLET ORAL at 18:34

## 2024-02-18 RX ADMIN — ENOXAPARIN SODIUM 50 MILLIGRAM(S): 100 INJECTION SUBCUTANEOUS at 05:02

## 2024-02-18 RX ADMIN — Medication 1 APPLICATION(S): at 18:31

## 2024-02-18 RX ADMIN — GABAPENTIN 300 MILLIGRAM(S): 400 CAPSULE ORAL at 18:35

## 2024-02-18 RX ADMIN — Medication 4 MILLILITER(S): at 12:59

## 2024-02-18 RX ADMIN — PANTOPRAZOLE SODIUM 40 MILLIGRAM(S): 20 TABLET, DELAYED RELEASE ORAL at 05:04

## 2024-02-18 RX ADMIN — Medication 3 MILLILITER(S): at 12:59

## 2024-02-18 RX ADMIN — CASPOFUNGIN ACETATE 260 MILLIGRAM(S): 7 INJECTION, POWDER, LYOPHILIZED, FOR SOLUTION INTRAVENOUS at 10:10

## 2024-02-18 RX ADMIN — MEROPENEM 100 MILLIGRAM(S): 1 INJECTION INTRAVENOUS at 11:52

## 2024-02-18 RX ADMIN — SODIUM CHLORIDE 4 MILLILITER(S): 9 INJECTION INTRAMUSCULAR; INTRAVENOUS; SUBCUTANEOUS at 19:15

## 2024-02-18 RX ADMIN — Medication 2 SPRAY(S): at 13:52

## 2024-02-18 RX ADMIN — SODIUM CHLORIDE 4 MILLILITER(S): 9 INJECTION INTRAMUSCULAR; INTRAVENOUS; SUBCUTANEOUS at 05:05

## 2024-02-18 RX ADMIN — SODIUM CHLORIDE 75 MILLILITER(S): 9 INJECTION INTRAMUSCULAR; INTRAVENOUS; SUBCUTANEOUS at 18:38

## 2024-02-18 RX ADMIN — Medication 975 MILLIGRAM(S): at 10:35

## 2024-02-18 RX ADMIN — SODIUM CHLORIDE 4 MILLILITER(S): 9 INJECTION INTRAMUSCULAR; INTRAVENOUS; SUBCUTANEOUS at 16:30

## 2024-02-18 RX ADMIN — Medication 2 SPRAY(S): at 05:06

## 2024-02-18 RX ADMIN — Medication 400 MILLIGRAM(S): at 10:10

## 2024-02-18 RX ADMIN — SODIUM CHLORIDE 75 MILLILITER(S): 9 INJECTION INTRAMUSCULAR; INTRAVENOUS; SUBCUTANEOUS at 21:56

## 2024-02-18 RX ADMIN — MIDODRINE HYDROCHLORIDE 20 MILLIGRAM(S): 2.5 TABLET ORAL at 05:02

## 2024-02-18 NOTE — PROGRESS NOTE ADULT - ATTENDING COMMENTS
Ms. Linton was seen and examined at bedside today, patient continues to require stable doses of high flow nasal cannula despite some improvement in her chest x-ray.  While there is low probability for intracardiac shunting as the etiology of her ongoing hypoxia, will obtain echo with bubble study for further workup.  Follow-up further ID recommendations.  Patient to remain in SDU.  I agree with the fellow note, with the exceptions listed in my attestation above.  The remainder of impression and plan per fellow note.

## 2024-02-18 NOTE — PROGRESS NOTE ADULT - SUBJECTIVE AND OBJECTIVE BOX
58F w/ PMHx Down Syndrome, nonverbal at baseline, Hypothyroidism, Cerebral palsy and Seizure Disorders presents to the ED from nursing facility/group home ([facility]) presented to ED for respiratory distress and high grade fever. Patient found to be septic on admission.Admitted to SDU for management of acute respiratory distress 2/2 CAP/Aspiration PNA (20 Jan 2024 18:22)  While pt was on the floor she developed dyspnea after swallow evaluation, was spiking high grade fever, consulted by pulmonary/critical care and was upgraded back to SDU.  Pt developed left lung white out, improved after aggressive chest PT, treated with Meropenem and caspofungin, ID is following, her overall prognosis is very poor, she might need trach and PEG in the nearest future , a meeting with facility staff and state took place on 2/14.   Pt completed course o Meropenem, Levofloxacin added on 2/15 ( pt spiked fever), Meropenem resumed on 2/18,  she was tapered off pressure support, still on high flow oxygen. While in SDU  Hb dropped without acute blood loss, will send anemia work up and monitor closely ( pt is o full AC).   In last 24 hours pt is spiking high grade fever, will repeat sepsis work up, check RVP, start IV fluids, call ID for a follow up.   On Tuesday will contract advisory board and discuss level of care.   Today pt is awake, anxious, febrile, tachycardic.       PAST MEDICAL & SURGICAL HISTORY:  Down syndrome  Osteoporosis  Mild anemia  Neuropathy  S/P debridement of R hip on 3/2/21    Allergies  No Known Allergies    Vital Signs Last 24 Hrs  T(C): 36.6 (18 Feb 2024 08:00), Max: 39 (17 Feb 2024 20:45)  T(F): 97.9 (18 Feb 2024 08:00), Max: 102.2 (17 Feb 2024 20:45)  HR: 126 (18 Feb 2024 08:00) (78 - 126)  BP: 119/58 (18 Feb 2024 08:00) (93/52 - 119/58)  BP(mean): 83 (18 Feb 2024 08:00) (74 - 83)  RR: 22 (18 Feb 2024 08:00) (20 - 22)  SpO2: 92% (18 Feb 2024 08:00) (88% - 99%)    Parameters below as of 18 Feb 2024 07:15  Patient On (Oxygen Delivery Method): nasal cannula, high flow  O2 Flow (L/min): 60  O2 Concentration (%): 90      PHYSICAL EXAM:   GENERAL: NAD, nonverbal, awake   HEENT: atraumatic, EOMI.  Lungs: coarse BS, no BS at left base   CVS: SIS2 +,  no murmur, tachycardic   Abdomen/ GI:  Soft,  NT, ND, BS+  CNS: awake , non verbal, anxious   Ext: contracted  Skin: no rash, no ulcers.    LABs:                           8.4    10.37 )-----------( 623      ( 18 Feb 2024 06:12 )             27.7   02-18    140  |  98  |  15  ----------------------------<  137<H>  4.6   |  29  |  0.5<L>    Ca    8.6      18 Feb 2024 06:12        Parameters below as of 04 Feb 2024 08:47  Patient On (Oxygen Delivery Method): nasal cannula, high flow  O2 Flow (L/min): 60  O2 Concentration (%): 100  Urinalysis Basic - ( 01 Feb 2024 11:40 )    Color: Yellow / Appearance: Turbid / SG: >1.030 / pH: x  Gluc: x / Ketone: Negative mg/dL  / Bili: Negative / Urobili: 1.0 mg/dL   Blood: x / Protein: 100 mg/dL / Nitrite: Negative   Leuk Esterase: Negative / RBC: 10 /HPF / WBC 5 /HPF   Sq Epi: x / Non Sq Epi: 2 /HPF / Bacteria: Negative /HPF    Culture - Blood (01.31.24 @ 18:21)   Specimen Source: .Blood None  Culture Results:   No growth at 24 hoursCulture - Urine (01.23.24 @ 05:20)   Specimen Source: Clean Catch Clean Catch (Midstream)  Culture Results:   No growthCulture - Blood (01.20.24 @ 16:15)   - Coagulase negative Staphylococcus: Detec  Gram Stain:   Growth in aerobic bottle: Gram positive cocci in pairs  Specimen Source: .Blood Blood-Peripheral  Organism: Blood Culture PCR  Culture Results:   Growth in aerobic bottle: Staphylococcus hominis   Isolation of Coagulase negative Staphylococcus from single blood culture     sets may represent   contamination. Contact the Microbiology Department at [phone] if   susceptibility testing is   clinically indicated.   Direct identification is available within approximately 3-5   hours either by Blood Panel Multiplexed PCR or Direct   MALDI-TOF. Details: [email or web address]  Organism Identification: Blood Culture PCR  Method Type: PCR    Culture - Blood (02.12.24 @ 11:17)   Specimen Source: .Blood None  Culture Results:   No growth at 24 hours      RADIOLOGY:     < from: Xray Chest 1 View- PORTABLE-Routine (Xray Chest 1 View- PORTABLE-Routine in AM.) (02.17.24 @ 07:21) >  FINDINGS/  IMPRESSION:    Unchanged NG tube.    Increased right basal opacities. Left midlung opacities, increased. Left   basal consolidation, decreased. No pneumothorax. Stable cardiomediastinal   silhouette.    Unchanged osseous structures.    < end of copied text >      < from: Xray Chest 1 View-PORTABLE IMMEDIATE (Xray Chest 1 View-PORTABLE IMMEDIATE .) (02.14.24 @ 08:11) >  IMPRESSION:    1. Enteric tube seen to the level of the stomach.  2. Bilateral opacities, worsened on the left    < end of copied text >  < from: VA Duplex Lower Ext Vein Scan, Bilat (02.11.24 @ 13:47) >    IMPRESSION:  No evidence of deep venous thrombosis in either lower extremity.    < end of copied text >      < from: VA Duplex Lower Ext Vein Scan, Bilat (01.22.24 @ 14:52) >  IMPRESSION:  No evidence of deep venous thrombosis in either lower extremity.    < end of copied text >  < from: TTE Echo Complete w/ Contrast w/o Doppler (01.22.24 @ 13:35) >  Summary:   1. Technically difficult and limited study.   2. Endocardial visualization was enhanced with intravenous echo contrast.   3. Left ventricular ejection fraction, by visual estimation, is >55%.   4. Normal global left ventricular systolic function.   5. The left ventricular diastolic function could not be assessed in this   study.  < from: Xray Chest 1 View- PORTABLE-Routine (Xray Chest 1 View- PORTABLE-Routine in AM.) (02.03.24 @ 06:48) >    Impression:    Stable bilateral opacities    < from: Xray Chest 1 View- PORTABLE-Routine (Xray Chest 1 View- PORTABLE-Routine in AM.) (02.17.24 @ 07:21) >  FINDINGS/  IMPRESSION:    Unchanged NG tube.    Increased right basal opacities. Left midlung opacities, increased. Left   basal consolidation, decreased. No pneumothorax. Stable cardiomediastinal   silhouette.    Unchanged osseous structures.    < end of copied text >      MEDICATIONS  (STANDING):  acetylcysteine 20%  Inhalation 4 milliLiter(s) Inhalation every 4 hours  albuterol/ipratropium for Nebulization 3 milliLiter(s) Nebulizer every 4 hours  artificial  tears Solution 1 Drop(s) Both EYES two times a day  calcium carbonate   1250 mG (OsCal) 1 Tablet(s) Oral two times a day  caspofungin IVPB 50 milliGRAM(s) IV Intermittent every 24 hours  caspofungin IVPB      chlorhexidine 2% Cloths 1 Application(s) Topical daily  enoxaparin Injectable 50 milliGRAM(s) SubCutaneous every 12 hours  gabapentin 300 milliGRAM(s) Oral every 12 hours  levETIRAcetam  Solution 500 milliGRAM(s) Oral every 12 hours  levoFLOXacin  Tablet 500 milliGRAM(s) Oral every 24 hours  melatonin 3 milliGRAM(s) Oral at bedtime  meropenem  IVPB 1000 milliGRAM(s) IV Intermittent every 8 hours  midodrine. 20 milliGRAM(s) Oral three times a day  pantoprazole  Injectable 40 milliGRAM(s) IV Push every 24 hours  polyethylene glycol 3350 17 Gram(s) Oral at bedtime  raloxifene 60 milliGRAM(s) Oral daily  saccharomyces boulardii 250 milliGRAM(s) Oral two times a day  senna 2 Tablet(s) Oral at bedtime  silver sulfADIAZINE 1% Cream 1 Application(s) Topical two times a day  sodium chloride 0.65% Nasal 2 Spray(s) Both Nostrils three times a day  sodium chloride 0.9%. 1000 milliLiter(s) (75 mL/Hr) IV Continuous <Continuous>  sodium chloride 3%  Inhalation 4 milliLiter(s) Inhalation every 4 hours    MEDICATIONS  (PRN):  acetaminophen     Tablet .. 650 milliGRAM(s) Oral every 6 hours PRN Temp greater or equal to 38C (100.4F), Mild Pain (1 - 3)  aluminum hydroxide/magnesium hydroxide/simethicone Suspension 30 milliLiter(s) Oral every 4 hours PRN Dyspepsia  ondansetron Injectable 4 milliGRAM(s) IV Push every 8 hours PRN Nausea and/or Vomiting

## 2024-02-18 NOTE — PROGRESS NOTE ADULT - SUBJECTIVE AND OBJECTIVE BOX
Patient is a 58y old  Female who presents with a chief complaint of Respiratory Distress (17 Feb 2024 09:47)        Over Night Events: remains on HFNC 60/90, off pressors, febrile overnight to 102.2      ROS:     All ROS are negative except HPI       PHYSICAL EXAM    ICU Vital Signs Last 24 Hrs  T(C): 37.8 (18 Feb 2024 05:00), Max: 39 (17 Feb 2024 20:45)  T(F): 100.1 (18 Feb 2024 05:00), Max: 102.2 (17 Feb 2024 20:45)  HR: 114 (18 Feb 2024 04:00) (78 - 114)  BP: 118/67 (18 Feb 2024 04:00) (91/45 - 118/67)  BP(mean): 74 (17 Feb 2024 20:45) (74 - 77)  RR: 20 (18 Feb 2024 04:00) (18 - 20)  SpO2: 88% (18 Feb 2024 04:00) (88% - 99%)    O2 Parameters below as of 17 Feb 2024 21:55  Patient On (Oxygen Delivery Method): nasal cannula, high flow  O2 Flow (L/min): 60  O2 Concentration (%): 90      CONSTITUTIONAL:  NAD    CARDIAC:   Normal rate,   Regular rhythm.    No edema    RESPIRATORY:   decreased on the left    GASTROINTESTINAL:  Abdomen soft,   Non-tender,   No guarding,     NEUROLOGICAL:   does not follow commands    SKIN:   sacral and heel friction wound    02-17-24 @ 07:01  -  02-18-24 @ 07:00  --------------------------------------------------------  IN:    Enteral Tube Flush: 250 mL    Jevity 1.2: 300 mL  Total IN: 550 mL    OUT:    Voided (mL): 400 mL  Total OUT: 400 mL    Total NET: 150 mL        LABS:                            7.7    10.03 )-----------( 662      ( 17 Feb 2024 06:52 )             25.6                                               02-17    144  |  103  |  16  ----------------------------<  147<H>  4.7   |  32  |  0.5<L>    Ca    8.4      17 Feb 2024 06:52                                               Urinalysis Basic - ( 17 Feb 2024 06:52 )    Color: x / Appearance: x / SG: x / pH: x  Gluc: 147 mg/dL / Ketone: x  / Bili: x / Urobili: x   Blood: x / Protein: x / Nitrite: x   Leuk Esterase: x / RBC: x / WBC x   Sq Epi: x / Non Sq Epi: x / Bacteria: x                                                                                                                                                                         MEDICATIONS  (STANDING):  acetylcysteine 20%  Inhalation 4 milliLiter(s) Inhalation every 4 hours  albuterol/ipratropium for Nebulization 3 milliLiter(s) Nebulizer every 4 hours  artificial  tears Solution 1 Drop(s) Both EYES two times a day  calcium carbonate   1250 mG (OsCal) 1 Tablet(s) Oral two times a day  caspofungin IVPB 50 milliGRAM(s) IV Intermittent every 24 hours  caspofungin IVPB      chlorhexidine 2% Cloths 1 Application(s) Topical daily  enoxaparin Injectable 50 milliGRAM(s) SubCutaneous every 12 hours  gabapentin 300 milliGRAM(s) Oral every 12 hours  levETIRAcetam  Solution 500 milliGRAM(s) Oral every 12 hours  levoFLOXacin  Tablet 500 milliGRAM(s) Oral every 24 hours  melatonin 3 milliGRAM(s) Oral at bedtime  midodrine. 20 milliGRAM(s) Oral three times a day  pantoprazole  Injectable 40 milliGRAM(s) IV Push every 24 hours  polyethylene glycol 3350 17 Gram(s) Oral at bedtime  raloxifene 60 milliGRAM(s) Oral daily  saccharomyces boulardii 250 milliGRAM(s) Oral two times a day  senna 2 Tablet(s) Oral at bedtime  silver sulfADIAZINE 1% Cream 1 Application(s) Topical two times a day  sodium chloride 0.65% Nasal 2 Spray(s) Both Nostrils three times a day  sodium chloride 3%  Inhalation 4 milliLiter(s) Inhalation every 4 hours    MEDICATIONS  (PRN):  acetaminophen     Tablet .. 650 milliGRAM(s) Oral every 6 hours PRN Temp greater or equal to 38C (100.4F), Mild Pain (1 - 3)  aluminum hydroxide/magnesium hydroxide/simethicone Suspension 30 milliLiter(s) Oral every 4 hours PRN Dyspepsia  ondansetron Injectable 4 milliGRAM(s) IV Push every 8 hours PRN Nausea and/or Vomiting      New X-rays reviewed:                                                                                  ECHO

## 2024-02-18 NOTE — PROGRESS NOTE ADULT - ASSESSMENT
58F w/ PMHx Down Syndrome, nonverbal at baseline, Hypothyroidism, Cerebral palsy and Seizure Disorders presents to the ED from nursing facility/group home ([facility]) presented to ED for respiratory distress and high grade fever. Patient found to be septic on admission.Admitted to SDU for management of acute respiratory distress 2/2 CAP/Aspiration PNA (20 Jan 2024 18:22)  While pt was on the floor she developed dyspnea after swallow evaluation, was spiking high grade fever, consulted by pulmonary/critical care and was upgraded back to SDU.    A/P  # Sepsis POA / Acute hypoxic resp failure / RSV bronchiolitis /  H/o Dysphagia/ suspected aspiration PNA /high grade fever   - CXR noted, worsening opacities   - aggressive chest PT with vest, aspiration precautions and suction   - check full RVP  - pt needs ID follow up   - completed the course of   meropenem  15 days on 2/16, Meropenem was resumed today ( as per prior ID recommendations), pt is spiking high grade fever   - repeat procalcitonin level , fungitel and serum galactomannan. Histo Ag negative,  - repeat sepsis work up  - c/w Levofloxacin 500 mg Q 24 hours  as per ID   - Fungitell started downtrending  after Caspo started  - Continue caspofungin 50mg q24h IV , will likely need empiric 14 day course. 2/18  - pt has no risk factors for PCP or other invasive fungal infection   - CT Chest, AP when stable   - MRSA negative   - BCx (1/20): Staph hominis -  contaminant repeat BCx have been NGTD  - Failed FEES, put  NG tube for feedings and medications, pt'll likely need PEG, consulted by GI   - Aspiration precautions, order chest PT vest   - Scopolamine patch d/c on 2/14  - supplement oxygen, monitor pulse Ox, on high flow oxygen for weeks   - c/w duoneb  - pulmonary is following, recommendations noted  - start Maintenance fluids for 24 hours     # Hypotension  - resolved   - c/w   Midodrine  20 mg Q 8 hours   - off pressure support now, stable   - keep MAP above 60     # Elevated Troponin , type 2 MI/  Sinus tachycardia  - c/w telemetry monitoring   - Repeat EKG: Normal sinus rhythm  - c/w  metoprolol  -  maintain fluid balance     # Seizure Disorder  - c/w  Keppra   - seizure precautions  - keep Mg above 2.0     # H/o lower ext DVT  - therapeutic Lovenox recommended by ICU team  - Eliquis held     # Hypothyroidism  - Thyroid Stimulating Hormone, Serum: 0.51 uIU/mL (01.21.24 @ 06:34)  - check FT4     # Normocytic Anemia   - monitor H/H, keep Hb above 7.5   - h/H trending down   - no active bleeding noted ( pt is on full AC)   - get anemia work up     # Down Syndrome/  Cerebral Palsy  - supportive care  - prevent falls and aspiration   - failed speech and swallow, needs PEG   - on Raloxifene     # Full code    # GI prophylaxis       Pending (specify): resume Meropenem, f/u  sepsis work up and  full respiratory panel, ID follow up ( pt is spiking high grade fever), f/u anemia work up, keep Hb above 7.5,   pulmonary toilet, chest PT vest,  frequent suction,  c/w  Caspofungin, c/w Levofloxacin,   supportive care, pt needs PEG, daily CXR , start maintenance fluids     overall prognosis is very poor, pt might benefit from comfort/end of life care in the nearest future.

## 2024-02-19 ENCOUNTER — RESULT REVIEW (OUTPATIENT)
Age: 59
End: 2024-02-19

## 2024-02-19 LAB
ANION GAP SERPL CALC-SCNC: 11 MMOL/L — SIGNIFICANT CHANGE UP (ref 7–14)
BUN SERPL-MCNC: 14 MG/DL — SIGNIFICANT CHANGE UP (ref 10–20)
CALCIUM SERPL-MCNC: 8.5 MG/DL — SIGNIFICANT CHANGE UP (ref 8.4–10.5)
CHLORIDE SERPL-SCNC: 102 MMOL/L — SIGNIFICANT CHANGE UP (ref 98–110)
CO2 SERPL-SCNC: 30 MMOL/L — SIGNIFICANT CHANGE UP (ref 17–32)
CREAT SERPL-MCNC: 0.5 MG/DL — LOW (ref 0.7–1.5)
EGFR: 109 ML/MIN/1.73M2 — SIGNIFICANT CHANGE UP
GLUCOSE SERPL-MCNC: 120 MG/DL — HIGH (ref 70–99)
HCT VFR BLD CALC: 25.1 % — LOW (ref 37–47)
HGB BLD-MCNC: 7.6 G/DL — LOW (ref 12–16)
MCHC RBC-ENTMCNC: 23.8 PG — LOW (ref 27–31)
MCHC RBC-ENTMCNC: 30.3 G/DL — LOW (ref 32–37)
MCV RBC AUTO: 78.7 FL — LOW (ref 81–99)
NRBC # BLD: 0 /100 WBCS — SIGNIFICANT CHANGE UP (ref 0–0)
PLATELET # BLD AUTO: 557 K/UL — HIGH (ref 130–400)
PMV BLD: 10.6 FL — HIGH (ref 7.4–10.4)
POTASSIUM SERPL-MCNC: 4.2 MMOL/L — SIGNIFICANT CHANGE UP (ref 3.5–5)
POTASSIUM SERPL-SCNC: 4.2 MMOL/L — SIGNIFICANT CHANGE UP (ref 3.5–5)
PROCALCITONIN SERPL-MCNC: 0.2 NG/ML — HIGH (ref 0.02–0.1)
RBC # BLD: 3.19 M/UL — LOW (ref 4.2–5.4)
RBC # FLD: 21.6 % — HIGH (ref 11.5–14.5)
SODIUM SERPL-SCNC: 143 MMOL/L — SIGNIFICANT CHANGE UP (ref 135–146)
WBC # BLD: 13.98 K/UL — HIGH (ref 4.8–10.8)
WBC # FLD AUTO: 13.98 K/UL — HIGH (ref 4.8–10.8)

## 2024-02-19 PROCEDURE — 71045 X-RAY EXAM CHEST 1 VIEW: CPT | Mod: 26

## 2024-02-19 PROCEDURE — 99232 SBSQ HOSP IP/OBS MODERATE 35: CPT | Mod: GC

## 2024-02-19 PROCEDURE — 99233 SBSQ HOSP IP/OBS HIGH 50: CPT

## 2024-02-19 PROCEDURE — 93308 TTE F-UP OR LMTD: CPT | Mod: 26

## 2024-02-19 RX ORDER — NOREPINEPHRINE BITARTRATE/D5W 8 MG/250ML
0.05 PLASTIC BAG, INJECTION (ML) INTRAVENOUS
Qty: 8 | Refills: 0 | Status: DISCONTINUED | OUTPATIENT
Start: 2024-02-19 | End: 2024-02-24

## 2024-02-19 RX ORDER — SODIUM CHLORIDE 9 MG/ML
500 INJECTION, SOLUTION INTRAVENOUS ONCE
Refills: 0 | Status: COMPLETED | OUTPATIENT
Start: 2024-02-19 | End: 2024-02-19

## 2024-02-19 RX ORDER — VANCOMYCIN HCL 1 G
750 VIAL (EA) INTRAVENOUS EVERY 12 HOURS
Refills: 0 | Status: DISCONTINUED | OUTPATIENT
Start: 2024-02-19 | End: 2024-02-21

## 2024-02-19 RX ADMIN — Medication 3 MILLILITER(S): at 16:19

## 2024-02-19 RX ADMIN — PANTOPRAZOLE SODIUM 40 MILLIGRAM(S): 20 TABLET, DELAYED RELEASE ORAL at 05:35

## 2024-02-19 RX ADMIN — Medication 2 SPRAY(S): at 21:26

## 2024-02-19 RX ADMIN — SENNA PLUS 2 TABLET(S): 8.6 TABLET ORAL at 21:24

## 2024-02-19 RX ADMIN — Medication 3 MILLILITER(S): at 19:35

## 2024-02-19 RX ADMIN — MEROPENEM 100 MILLIGRAM(S): 1 INJECTION INTRAVENOUS at 14:29

## 2024-02-19 RX ADMIN — Medication 4 MILLILITER(S): at 08:11

## 2024-02-19 RX ADMIN — Medication 2 SPRAY(S): at 05:36

## 2024-02-19 RX ADMIN — Medication 1 APPLICATION(S): at 05:35

## 2024-02-19 RX ADMIN — Medication 650 MILLIGRAM(S): at 04:11

## 2024-02-19 RX ADMIN — Medication 650 MILLIGRAM(S): at 17:50

## 2024-02-19 RX ADMIN — Medication 650 MILLIGRAM(S): at 05:58

## 2024-02-19 RX ADMIN — Medication 1 DROP(S): at 18:51

## 2024-02-19 RX ADMIN — SODIUM CHLORIDE 4 MILLILITER(S): 9 INJECTION INTRAMUSCULAR; INTRAVENOUS; SUBCUTANEOUS at 08:10

## 2024-02-19 RX ADMIN — Medication 250 MILLIGRAM(S): at 17:51

## 2024-02-19 RX ADMIN — LEVETIRACETAM 500 MILLIGRAM(S): 250 TABLET, FILM COATED ORAL at 05:35

## 2024-02-19 RX ADMIN — Medication 1 TABLET(S): at 05:36

## 2024-02-19 RX ADMIN — Medication 1 APPLICATION(S): at 18:42

## 2024-02-19 RX ADMIN — Medication 3 MILLIGRAM(S): at 21:26

## 2024-02-19 RX ADMIN — MIDODRINE HYDROCHLORIDE 20 MILLIGRAM(S): 2.5 TABLET ORAL at 17:51

## 2024-02-19 RX ADMIN — Medication 250 MILLIGRAM(S): at 05:34

## 2024-02-19 RX ADMIN — POLYETHYLENE GLYCOL 3350 17 GRAM(S): 17 POWDER, FOR SOLUTION ORAL at 21:27

## 2024-02-19 RX ADMIN — Medication 4.9 MICROGRAM(S)/KG/MIN: at 10:12

## 2024-02-19 RX ADMIN — SODIUM CHLORIDE 500 MILLILITER(S): 9 INJECTION, SOLUTION INTRAVENOUS at 09:53

## 2024-02-19 RX ADMIN — Medication 2 SPRAY(S): at 17:51

## 2024-02-19 RX ADMIN — LEVETIRACETAM 500 MILLIGRAM(S): 250 TABLET, FILM COATED ORAL at 17:51

## 2024-02-19 RX ADMIN — Medication 3 MILLILITER(S): at 08:04

## 2024-02-19 RX ADMIN — MIDODRINE HYDROCHLORIDE 20 MILLIGRAM(S): 2.5 TABLET ORAL at 12:27

## 2024-02-19 RX ADMIN — Medication 4 MILLILITER(S): at 19:51

## 2024-02-19 RX ADMIN — Medication 1 DROP(S): at 05:36

## 2024-02-19 RX ADMIN — Medication 1 TABLET(S): at 18:32

## 2024-02-19 RX ADMIN — ENOXAPARIN SODIUM 50 MILLIGRAM(S): 100 INJECTION SUBCUTANEOUS at 05:35

## 2024-02-19 RX ADMIN — GABAPENTIN 300 MILLIGRAM(S): 400 CAPSULE ORAL at 17:51

## 2024-02-19 RX ADMIN — MEROPENEM 100 MILLIGRAM(S): 1 INJECTION INTRAVENOUS at 21:26

## 2024-02-19 RX ADMIN — Medication 250 MILLIGRAM(S): at 12:30

## 2024-02-19 RX ADMIN — Medication 250 MILLIGRAM(S): at 18:33

## 2024-02-19 RX ADMIN — ENOXAPARIN SODIUM 50 MILLIGRAM(S): 100 INJECTION SUBCUTANEOUS at 17:50

## 2024-02-19 RX ADMIN — MEROPENEM 100 MILLIGRAM(S): 1 INJECTION INTRAVENOUS at 05:32

## 2024-02-19 RX ADMIN — MIDODRINE HYDROCHLORIDE 20 MILLIGRAM(S): 2.5 TABLET ORAL at 05:33

## 2024-02-19 RX ADMIN — CASPOFUNGIN ACETATE 260 MILLIGRAM(S): 7 INJECTION, POWDER, LYOPHILIZED, FOR SOLUTION INTRAVENOUS at 09:35

## 2024-02-19 RX ADMIN — GABAPENTIN 300 MILLIGRAM(S): 400 CAPSULE ORAL at 05:35

## 2024-02-19 RX ADMIN — Medication 650 MILLIGRAM(S): at 18:20

## 2024-02-19 RX ADMIN — RALOXIFENE HYDROCHLORIDE 60 MILLIGRAM(S): 60 TABLET, COATED ORAL at 12:27

## 2024-02-19 RX ADMIN — CHLORHEXIDINE GLUCONATE 1 APPLICATION(S): 213 SOLUTION TOPICAL at 12:48

## 2024-02-19 NOTE — PROGRESS NOTE ADULT - SUBJECTIVE AND OBJECTIVE BOX
58F w/ PMHx Down Syndrome, nonverbal at baseline, Hypothyroidism, Cerebral palsy and Seizure Disorders presents to the ED from nursing facility/group home ([facility]) presented to ED for respiratory distress and high grade fever. Patient found to be septic on admission.Admitted to SDU for management of acute respiratory distress 2/2 CAP/Aspiration PNA   While pt was on the floor she developed dyspnea after swallow evaluation, was spiking high grade fever, consulted by pulmonary/critical care and was upgraded back to SDU.  Pt developed left lung white out, improved after aggressive chest PT, treated with Meropenem and caspofungin, ID is following, her overall prognosis is very poor, she might need trach and PEG in the nearest future , a meeting with facility staff and state took place on 2/14.   Pt completed course o Meropenem, Levofloxacin added on 2/15 ( pt spiked fever), Meropenem resumed on 2/18,  she was tapered off pressure support, still on high flow oxygen. While in SDU  Hb dropped without acute blood loss, will send anemia work up and monitor closely ( pt is o full AC).   In last 24 hours pt is spiking high grade fever, will repeat sepsis work up, check RVP, start IV fluids, call ID for a follow up.   On Tuesday will contract advisory board and discuss level of care.   Today pt is awake, anxious, febrile, tachycardic.       Currently admitted to medicine with the primary diagnosis of Sepsis with acute hypoxic respiratory failure       Today is hospital day 30d.     PAST MEDICAL & SURGICAL HISTORY  Down syndrome    Osteoporosis    Mild anemia    Neuropathy    S/P debridement  of R hip on 3/2/21        ALLERGIES:  No Known Allergies    MEDICATIONS:  ACTIVE MEDICATIONS  acetaminophen     Tablet .. 650 milliGRAM(s) Oral every 6 hours PRN  acetylcysteine 20%  Inhalation 4 milliLiter(s) Inhalation every 4 hours  albuterol/ipratropium for Nebulization 3 milliLiter(s) Nebulizer every 4 hours  aluminum hydroxide/magnesium hydroxide/simethicone Suspension 30 milliLiter(s) Oral every 4 hours PRN  artificial  tears Solution 1 Drop(s) Both EYES two times a day  calcium carbonate   1250 mG (OsCal) 1 Tablet(s) Oral two times a day  caspofungin IVPB      caspofungin IVPB 50 milliGRAM(s) IV Intermittent every 24 hours  chlorhexidine 2% Cloths 1 Application(s) Topical daily  enoxaparin Injectable 50 milliGRAM(s) SubCutaneous every 12 hours  gabapentin 300 milliGRAM(s) Oral every 12 hours  levETIRAcetam  Solution 500 milliGRAM(s) Oral every 12 hours  levoFLOXacin  Tablet 500 milliGRAM(s) Oral every 24 hours  melatonin 3 milliGRAM(s) Oral at bedtime  meropenem  IVPB 1000 milliGRAM(s) IV Intermittent every 8 hours  midodrine. 20 milliGRAM(s) Oral three times a day  norepinephrine Infusion 0.05 MICROgram(s)/kG/Min IV Continuous <Continuous>  ondansetron Injectable 4 milliGRAM(s) IV Push every 8 hours PRN  pantoprazole  Injectable 40 milliGRAM(s) IV Push every 24 hours  polyethylene glycol 3350 17 Gram(s) Oral at bedtime  raloxifene 60 milliGRAM(s) Oral daily  saccharomyces boulardii 250 milliGRAM(s) Oral two times a day  senna 2 Tablet(s) Oral at bedtime  silver sulfADIAZINE 1% Cream 1 Application(s) Topical two times a day  sodium chloride 0.65% Nasal 2 Spray(s) Both Nostrils three times a day  sodium chloride 3%  Inhalation 4 milliLiter(s) Inhalation every 4 hours  vancomycin  IVPB 750 milliGRAM(s) IV Intermittent every 12 hours      VITALS:   T(F): 98.6  HR: 103  BP: 101/46  RR: 20  SpO2: 98%    LABS:                        7.6    13.98 )-----------( 557      ( 19 Feb 2024 05:29 )             25.1     02-19    143  |  102  |  14  ----------------------------<  120<H>  4.2   |  30  |  0.5<L>    Ca    8.5      19 Feb 2024 05:29        Urinalysis Basic - ( 19 Feb 2024 05:29 )    Color: x / Appearance: x / SG: x / pH: x  Gluc: 120 mg/dL / Ketone: x  / Bili: x / Urobili: x   Blood: x / Protein: x / Nitrite: x   Leuk Esterase: x / RBC: x / WBC x   Sq Epi: x / Non Sq Epi: x / Bacteria: x            Culture - Blood (collected 17 Feb 2024 20:41)  Source: .Blood None  Preliminary Report (19 Feb 2024 06:01):    No growth at 24 hours              PHYSICAL EXAM:  GEN: No acute distress  LUNGS: Clear to auscultation bilaterally   HEART: S1/S2 present.    ABD: Soft, non-tender, non-distended.   EXT: No pedal edema  NEURO: AAOX3

## 2024-02-19 NOTE — PROGRESS NOTE ADULT - SUBJECTIVE AND OBJECTIVE BOX
Patient is a 58y old  Female who presents with a chief complaint of Respiratory Distress (18 Feb 2024 11:06)        Over Night Events:  Tmax 101.6F noted. Resumed ABX. On HFNC 60/70.       ROS:     All ROS are negative except HPI         PHYSICAL EXAM    ICU Vital Signs Last 24 Hrs  T(C): 38.7 (19 Feb 2024 07:44), Max: 38.7 (18 Feb 2024 23:58)  T(F): 101.6 (19 Feb 2024 07:44), Max: 101.6 (18 Feb 2024 23:58)  HR: 97 (19 Feb 2024 07:44) (97 - 113)  BP: 87/42 (19 Feb 2024 07:44) (87/42 - 114/73)  BP(mean): 61 (19 Feb 2024 07:44) (61 - 61)  ABP: --  ABP(mean): --  RR: 20 (19 Feb 2024 07:44) (20 - 20)  SpO2: 99% (19 Feb 2024 07:44) (90% - 99%)    O2 Parameters below as of 19 Feb 2024 07:44  Patient On (Oxygen Delivery Method): nasal cannula, high flow            CONSTITUTIONAL:  in NAD    ENT:   Airway patent,   Mouth with normal mucosa.     EYES:   Pupils equal,   Round and reactive to light.    CARDIAC:   Normal rate,   Regular rhythm.    No edema    RESPIRATORY:   Decreased bilateral BS  Normal chest expansion  Not tachypneic,  No use of accessory muscles    GASTROINTESTINAL:  Abdomen soft,   Non-tender,   No guarding,     MUSCULOSKELETAL:   Range of motion is limited,  No clubbing, cyanosis    NEUROLOGICAL:   Alert   Intermittently follows commands?    SKIN:   Stage II sacral ulcer  Right heel wound       02-18-24 @ 07:01  -  02-19-24 @ 07:00  --------------------------------------------------------  IN:    Enteral Tube Flush: 300 mL    Enteral Tube Flush: 750 mL    IV PiggyBack: 100 mL    Jevity 1.2: 900 mL    sodium chloride 0.9%: 900 mL  Total IN: 2950 mL    OUT:    Voided (mL): 1500 mL  Total OUT: 1500 mL    Total NET: 1450 mL          LABS:                            7.6    13.98 )-----------( 557      ( 19 Feb 2024 05:29 )             25.1                                               02-19    143  |  102  |  14  ----------------------------<  120<H>  4.2   |  30  |  0.5<L>    Ca    8.5      19 Feb 2024 05:29                                               Urinalysis Basic - ( 19 Feb 2024 05:29 )    Color: x / Appearance: x / SG: x / pH: x  Gluc: 120 mg/dL / Ketone: x  / Bili: x / Urobili: x   Blood: x / Protein: x / Nitrite: x   Leuk Esterase: x / RBC: x / WBC x   Sq Epi: x / Non Sq Epi: x / Bacteria: x                                                                                           Culture - Blood (collected 17 Feb 2024 20:41)  Source: .Blood None  Preliminary Report (19 Feb 2024 06:01):    No growth at 24 hours                                                                                           MEDICATIONS  (STANDING):  acetylcysteine 20%  Inhalation 4 milliLiter(s) Inhalation every 4 hours  albuterol/ipratropium for Nebulization 3 milliLiter(s) Nebulizer every 4 hours  artificial  tears Solution 1 Drop(s) Both EYES two times a day  calcium carbonate   1250 mG (OsCal) 1 Tablet(s) Oral two times a day  caspofungin IVPB      caspofungin IVPB 50 milliGRAM(s) IV Intermittent every 24 hours  chlorhexidine 2% Cloths 1 Application(s) Topical daily  enoxaparin Injectable 50 milliGRAM(s) SubCutaneous every 12 hours  gabapentin 300 milliGRAM(s) Oral every 12 hours  levETIRAcetam  Solution 500 milliGRAM(s) Oral every 12 hours  levoFLOXacin  Tablet 500 milliGRAM(s) Oral every 24 hours  melatonin 3 milliGRAM(s) Oral at bedtime  meropenem  IVPB 1000 milliGRAM(s) IV Intermittent every 8 hours  midodrine. 20 milliGRAM(s) Oral three times a day  pantoprazole  Injectable 40 milliGRAM(s) IV Push every 24 hours  polyethylene glycol 3350 17 Gram(s) Oral at bedtime  raloxifene 60 milliGRAM(s) Oral daily  saccharomyces boulardii 250 milliGRAM(s) Oral two times a day  senna 2 Tablet(s) Oral at bedtime  silver sulfADIAZINE 1% Cream 1 Application(s) Topical two times a day  sodium chloride 0.65% Nasal 2 Spray(s) Both Nostrils three times a day  sodium chloride 0.9%. 1000 milliLiter(s) (75 mL/Hr) IV Continuous <Continuous>  sodium chloride 3%  Inhalation 4 milliLiter(s) Inhalation every 4 hours    MEDICATIONS  (PRN):  acetaminophen     Tablet .. 650 milliGRAM(s) Oral every 6 hours PRN Temp greater or equal to 38C (100.4F), Mild Pain (1 - 3)  aluminum hydroxide/magnesium hydroxide/simethicone Suspension 30 milliLiter(s) Oral every 4 hours PRN Dyspepsia  ondansetron Injectable 4 milliGRAM(s) IV Push every 8 hours PRN Nausea and/or Vomiting      New X-rays reviewed:   Improved aeration of left lung.                                                                               ECHO

## 2024-02-19 NOTE — PROGRESS NOTE ADULT - SUBJECTIVE AND OBJECTIVE BOX
58F w/ PMHx Down Syndrome, nonverbal at baseline, Hypothyroidism, Cerebral palsy and Seizure Disorders presents to the ED from nursing facility/group home ([facility]) presented to ED for respiratory distress and high grade fever. Patient found to be septic on admission.Admitted to SDU for management of acute respiratory distress 2/2 CAP/Aspiration PNA (20 Jan 2024 18:22)  While pt was on the floor she developed dyspnea after swallow evaluation, was spiking high grade fever, consulted by pulmonary/critical care and was upgraded back to SDU.  Pt developed left lung white out, improved after aggressive chest PT, treated with Meropenem and caspofungin, ID is following, her overall prognosis is very poor, she might need trach and PEG in the nearest future , a meeting with facility staff and state took place on 2/14.   Pt completed course o Meropenem, Levofloxacin added on 2/15 ( pt spiked fever), Meropenem resumed on 2/18,  she was tapered off pressure support, still on high flow oxygen. While in SDU  Hb dropped without acute blood loss, will send anemia work up and monitor closely ( pt is o full AC).   In last 48 hours pt is spiking high grade fever, f/u repeat sepsis work up, IV hydration, start pressure support, keep MAP above 60.     Today pt is lethargic, comfortable with cooling blanket.       PAST MEDICAL & SURGICAL HISTORY:  Down syndrome  Osteoporosis  Mild anemia  Neuropathy  S/P debridement of R hip on 3/2/21    Allergies  No Known Allergies    Vital Signs Last 24 Hrs  T(C): 37 (19 Feb 2024 11:20), Max: 38.7 (18 Feb 2024 23:58)  T(F): 98.6 (19 Feb 2024 11:20), Max: 101.6 (18 Feb 2024 23:58)  HR: 103 (19 Feb 2024 11:20) (91 - 113)  BP: 101/46 (19 Feb 2024 11:20) (87/42 - 114/73)  BP(mean): 66 (19 Feb 2024 11:20) (61 - 66)  RR: 20 (19 Feb 2024 11:20) (20 - 20)  SpO2: 98% (19 Feb 2024 11:20) (90% - 99%)    Parameters below as of 19 Feb 2024 11:20  Patient On (Oxygen Delivery Method): nasal cannula, high flow          PHYSICAL EXAM:   GENERAL: NAD, nonverbal  HEENT: atraumatic, EOMI.  Lungs: coarse BS, no BS at left base   CVS: SIS2 +,  no murmur, tachycardic   Abdomen/ GI:  Soft,  NT, ND, BS+  CNS: awake , non verbal, anxious   Ext: contracted  Skin: no rash, no ulcers.    LABs:                           7.6    13.98 )-----------( 557      ( 19 Feb 2024 05:29 )             25.1   02-19    143  |  102  |  14  ----------------------------<  120<H>  4.2   |  30  |  0.5<L>    Ca    8.5      19 Feb 2024 05:29    Parameters below as of 04 Feb 2024 08:47  Patient On (Oxygen Delivery Method): nasal cannula, high flow  O2 Flow (L/min): 60  O2 Concentration (%): 100  Urinalysis Basic - ( 01 Feb 2024 11:40 )    Color: Yellow / Appearance: Turbid / SG: >1.030 / pH: x  Gluc: x / Ketone: Negative mg/dL  / Bili: Negative / Urobili: 1.0 mg/dL   Blood: x / Protein: 100 mg/dL / Nitrite: Negative   Leuk Esterase: Negative / RBC: 10 /HPF / WBC 5 /HPF   Sq Epi: x / Non Sq Epi: 2 /HPF / Bacteria: Negative /HPF    Culture - Blood (01.31.24 @ 18:21)   Specimen Source: .Blood None  Culture Results:   No growth at 24 hoursCulture - Urine (01.23.24 @ 05:20)   Specimen Source: Clean Catch Clean Catch (Midstream)  Culture Results:   No growthCulture - Blood (01.20.24 @ 16:15)   - Coagulase negative Staphylococcus: Detec  Gram Stain:   Growth in aerobic bottle: Gram positive cocci in pairs  Specimen Source: .Blood Blood-Peripheral  Organism: Blood Culture PCR  Culture Results:   Growth in aerobic bottle: Staphylococcus hominis   Isolation of Coagulase negative Staphylococcus from single blood culture     sets may represent   contamination. Contact the Microbiology Department at [phone] if   susceptibility testing is   clinically indicated.   Direct identification is available within approximately 3-5   hours either by Blood Panel Multiplexed PCR or Direct   MALDI-TOF. Details: [email or web address]  Organism Identification: Blood Culture PCR  Method Type: PCR    Culture - Blood (02.12.24 @ 11:17)   Specimen Source: .Blood None  Culture Results:   No growth at 24 hours    Culture - Blood (02.17.24 @ 20:41)   Specimen Source: .Blood None  Culture Results:   No growth at 24 hours  RADIOLOGY:   < from: Xray Chest 1 View- PORTABLE-Routine (Xray Chest 1 View- PORTABLE-Routine in AM.) (02.19.24 @ 05:58) >  Findings/  impression:        Support devices: NG tube satisfactory position.  EKG leads overlie thorax, obscuring anatomy.    Cardiac/ Mediastinum: Stable coronary    Lungs/ Pleura: Interval decrease in diffuse left lung opacity.  Stable right lung opacity. No pneumothorax    Skeletal/ soft tissues: Stable    < end of copied text >    < from: Xray Chest 1 View- PORTABLE-Routine (Xray Chest 1 View- PORTABLE-Routine in AM.) (02.17.24 @ 07:21) >  FINDINGS/  IMPRESSION:    Unchanged NG tube.    Increased right basal opacities. Left midlung opacities, increased. Left   basal consolidation, decreased. No pneumothorax. Stable cardiomediastinal   silhouette.    Unchanged osseous structures.    < end of copied text >      < from: Xray Chest 1 View-PORTABLE IMMEDIATE (Xray Chest 1 View-PORTABLE IMMEDIATE .) (02.14.24 @ 08:11) >  IMPRESSION:    1. Enteric tube seen to the level of the stomach.  2. Bilateral opacities, worsened on the left    < end of copied text >  < from: VA Duplex Lower Ext Vein Scan, Bilat (02.11.24 @ 13:47) >    IMPRESSION:  No evidence of deep venous thrombosis in either lower extremity.    < end of copied text >      < from: VA Duplex Lower Ext Vein Scan, Bilat (01.22.24 @ 14:52) >  IMPRESSION:  No evidence of deep venous thrombosis in either lower extremity.    < end of copied text >  < from: TTE Echo Complete w/ Contrast w/o Doppler (01.22.24 @ 13:35) >  Summary:   1. Technically difficult and limited study.   2. Endocardial visualization was enhanced with intravenous echo contrast.   3. Left ventricular ejection fraction, by visual estimation, is >55%.   4. Normal global left ventricular systolic function.   5. The left ventricular diastolic function could not be assessed in this   study.  < from: Xray Chest 1 View- PORTABLE-Routine (Xray Chest 1 View- PORTABLE-Routine in AM.) (02.03.24 @ 06:48) >    Impression:    Stable bilateral opacities    < from: Xray Chest 1 View- PORTABLE-Routine (Xray Chest 1 View- PORTABLE-Routine in AM.) (02.17.24 @ 07:21) >  FINDINGS/  IMPRESSION:    Unchanged NG tube.    Increased right basal opacities. Left midlung opacities, increased. Left   basal consolidation, decreased. No pneumothorax. Stable cardiomediastinal   silhouette.    Unchanged osseous structures.    < end of copied text >      MEDICATIONS  (STANDING):  acetylcysteine 20%  Inhalation 4 milliLiter(s) Inhalation every 4 hours  albuterol/ipratropium for Nebulization 3 milliLiter(s) Nebulizer every 4 hours  artificial  tears Solution 1 Drop(s) Both EYES two times a day  calcium carbonate   1250 mG (OsCal) 1 Tablet(s) Oral two times a day  caspofungin IVPB      caspofungin IVPB 50 milliGRAM(s) IV Intermittent every 24 hours  chlorhexidine 2% Cloths 1 Application(s) Topical daily  enoxaparin Injectable 50 milliGRAM(s) SubCutaneous every 12 hours  gabapentin 300 milliGRAM(s) Oral every 12 hours  levETIRAcetam  Solution 500 milliGRAM(s) Oral every 12 hours  levoFLOXacin  Tablet 500 milliGRAM(s) Oral every 24 hours  melatonin 3 milliGRAM(s) Oral at bedtime  meropenem  IVPB 1000 milliGRAM(s) IV Intermittent every 8 hours  midodrine. 20 milliGRAM(s) Oral three times a day  norepinephrine Infusion 0.05 MICROgram(s)/kG/Min (4.9 mL/Hr) IV Continuous <Continuous>  pantoprazole  Injectable 40 milliGRAM(s) IV Push every 24 hours  polyethylene glycol 3350 17 Gram(s) Oral at bedtime  raloxifene 60 milliGRAM(s) Oral daily  saccharomyces boulardii 250 milliGRAM(s) Oral two times a day  senna 2 Tablet(s) Oral at bedtime  silver sulfADIAZINE 1% Cream 1 Application(s) Topical two times a day  sodium chloride 0.65% Nasal 2 Spray(s) Both Nostrils three times a day  sodium chloride 3%  Inhalation 4 milliLiter(s) Inhalation every 4 hours  vancomycin  IVPB 750 milliGRAM(s) IV Intermittent every 12 hours    MEDICATIONS  (PRN):  acetaminophen     Tablet .. 650 milliGRAM(s) Oral every 6 hours PRN Temp greater or equal to 38C (100.4F), Mild Pain (1 - 3)  aluminum hydroxide/magnesium hydroxide/simethicone Suspension 30 milliLiter(s) Oral every 4 hours PRN Dyspepsia  ondansetron Injectable 4 milliGRAM(s) IV Push every 8 hours PRN Nausea and/or Vomiting

## 2024-02-19 NOTE — PROGRESS NOTE ADULT - ASSESSMENT
IMPRESSION:    Acute hypoxemic respiratory failure  Likely aspiration pneumonia   Sepsis POA  Septic shock off Levophed   Recurrent aspiration pneumonia / prior intubation  HO DVT on Eliquis   HO GI bleed  HO OM  HO recent duodenal perforation   HO polymicrobial bacteremia   H/o CP, DS  H/o seizures    PLAN:    CNS:  Avoid CNS Depressant, AED. MS at baseline.     HEENT: Oral care.     PULMONARY: HOB at 45 degrees.  Aspiration precaution. Wean FiO2 Keep spo2 more than 92%.  Daily CXR. Aggressive pulm toilet, frequent suctioning. Chest vest. Might need MV. CXR noted.     CARDIOVASCULAR: Keep MAP more than 60. Avoid overload. C/w midodrine 20mg.  Goal directed fluid resuscitation.    GI: Protonix. NG  feeding. Speech and swallow recs noted. Might need PEG when more stable    INFECTIOUS DISEASE:   c/w Meropenem.  Finish empiric Caspo course 2/18. Repeat blood Cx, serum galactomannan. Histo Ag negative, ID follow up. Repeat RVP 2/17 negative. Repeat BCx, procal, and UA.     HEMATOLOGICAL:  Lovenox therapeutic.  Monitor CBC     ENDOCRINE:  Follow up FS.  Insulin protocol if needed.    MUSCULOSKELETAL: Bedrest.  Off loading.  Wound care.      Prognosis very poor.    Palliative care following    SDU. IMPRESSION:    Acute hypoxemic respiratory failure  Likely aspiration pneumonia   Sepsis POA  Septic shock off Levophed   Recurrent aspiration pneumonia / prior intubation  HO DVT on Eliquis   HO GI bleed  HO OM  HO recent duodenal perforation   HO polymicrobial bacteremia   H/o CP, DS  H/o seizures    PLAN:    CNS:  Avoid CNS Depressant, AED. MS at baseline.     HEENT: Oral care.     PULMONARY: HOB at 45 degrees.  Aspiration precaution. Wean FiO2 Keep spo2 more than 92%.  Daily CXR. Aggressive pulm toilet, frequent suctioning. Chest vest. Might need MV. CXR noted.     CARDIOVASCULAR: Keep MAP more than 60. Avoid overload. C/w midodrine 20mg.  Goal directed fluid resuscitation. TTE with bubble study to rule out shunting.    GI: Protonix. NG  feeding. Speech and swallow recs noted. Might need PEG when more stable    INFECTIOUS DISEASE:   c/w Meropenem.  Finish empiric Caspo course 2/18. Repeat blood Cx, serum galactomannan. Histo Ag negative, ID follow up. Repeat RVP 2/17 negative. Repeat BCx, procal, and UA.     HEMATOLOGICAL:  Lovenox therapeutic.  Monitor CBC     ENDOCRINE:  Follow up FS.  Insulin protocol if needed.    MUSCULOSKELETAL: Bedrest.  Off loading.  Wound care.      Prognosis very poor.    Palliative care following    SDU.

## 2024-02-19 NOTE — PROGRESS NOTE ADULT - ATTENDING COMMENTS
Ms. Linton was seen and examined at bedside today, patient continues to have mild improvement in her high flow nasal cannula requirements.  Patient is still aspirating, bubble study for workup of unlikely intracardiac shunting is still pending.  Continue to monitor in SDU for high risk of decompensation.  I agree with the fellow note, with the exceptions listed in my attestation above.  The remainder of impression and plan per fellow note.

## 2024-02-19 NOTE — PROGRESS NOTE ADULT - ASSESSMENT
58F w/ PMHx Down Syndrome, nonverbal at baseline, Hypothyroidism, Cerebral palsy and Seizure Disorders presents to the ED from nursing facility/group home ([facility]) presented to ED for respiratory distress and high grade fever. Patient found to be septic on admission.Admitted to SDU for management of acute respiratory distress 2/2 CAP/Aspiration PNA (20 Jan 2024 18:22)  While pt was on the floor she developed dyspnea after swallow evaluation, was spiking high grade fever, consulted by pulmonary/critical care and was upgraded back to SDU.    A/P  # Sepsis POA / Acute hypoxic resp failure / RSV bronchiolitis /  H/o Dysphagia/ suspected aspiration PNA /high grade fever   - CXR noted, worsening opacities   - completed the course of  meropenem 15 days on 2/16, Meropenem was resumed 2/18 ( as per prior ID recommendations), pt is spiking high grade fever and wbc uptrending.   - Histo Ag negative, only positive blood cx is for coag -ve staph  - currently on HFNC 16L FiO2 70%  - hypotensive again today 2/19 despite fluid boluses so started levophed. - c/w   Midodrine  20 mg Q 8 hours   - MRSA negative   - BCx (1/20): Staph hominis -  contaminant repeat BCx have been NGTD    plan:  - levophed and midodrine. dc fluids  - full rvp, UA, Blood Cx, CXR, fungitell, procal  - ID 2/19: c/w danna, levofloxacin, and caspofungin, add vancomycin (even though previous mrsa negative). Note: caspofungin was supposed to end 2/18 but will continue given fevers and elevated wbc  - Failed FEES, put NG tube for feedings and medications, pt'll likely need PEG/trach when stable, consulted by GI   - Aspiration precautions, order chest PT vest   - Scopolamine patch d/c on 2/14  - supplement oxygen, monitor pulse Ox, on high flow oxygen for weeks   - c/w duoneb  - pulmonary is following, recommendations noted  - aggressive chest PT with vest, aspiration precautions and suction     # Elevated Troponin , type 2 MI/  Sinus tachycardia  - c/w telemetry monitoring   - Repeat EKG: Normal sinus rhythm  - c/w  metoprolol  -  maintain fluid balance     # Seizure Disorder  - c/w  Keppra   - seizure precautions  - keep Mg above 2.0     # H/o lower ext DVT  - therapeutic Lovenox recommended by ICU team  - Eliquis held     # Hypothyroidism  - Thyroid Stimulating Hormone, Serum: 0.51 uIU/mL (01.21.24 @ 06:34)  - check FT4     # Normocytic Anemia   - monitor H/H, keep Hb above 7.5   - h/H trending down   - no active bleeding noted ( pt is on full AC)   - get anemia work up     # Down Syndrome/  Cerebral Palsy  - supportive care  - prevent falls and aspiration   - failed speech and swallow, needs PEG   - on Raloxifene     # Full code  - conversation tomorrow for GOC with guardian    # GI prophylaxis       Pending (specify):f/u  sepsis work up and  full respiratory panel, ID follow up ( pt is spiking high grade fever), f/u anemia work up, keep Hb above 7.5,   pulmonary toilet, chest PT vest,  frequent suction,  c/w  Caspofungin, c/w Levofloxacin,   supportive care, pt needs PEG, daily CXR ,     overall prognosis is very poor, pt might benefit from comfort/end of life care in the nearest future.

## 2024-02-19 NOTE — PROGRESS NOTE ADULT - ASSESSMENT
58F w/ PMHx Down Syndrome, nonverbal at baseline, Hypothyroidism, Cerebral palsy and Seizure Disorders presents to the ED from nursing facility/group home ([facility]) presented to ED for respiratory distress and high grade fever. Patient found to be septic on admission.Admitted to SDU for management of acute respiratory distress 2/2 CAP/Aspiration PNA (20 Jan 2024 18:22)  While pt was on the floor she developed dyspnea after swallow evaluation, was spiking high grade fever, consulted by pulmonary/critical care and was upgraded back to SDU.    A/P  # Sepsis POA / Acute hypoxic resp failure / RSV bronchiolitis /  H/o Dysphagia/ suspected aspiration PNA /high grade fever   - CXR noted, worsening opacities   - aggressive chest PT with vest, aspiration precautions and suction   - pt is spiking high grade fever, started on IV hydration, received bolus this morning, became hypotensive, started on Levophed, will keep MAP above 60  - ID follow up   - completed the course of   meropenem  15 days on 2/16, Meropenem was resumed on 2/18 ( as per prior ID recommendations), pt is spiking high grade fever   - f/u  procalcitonin level , fungitel and serum galactomannan. Histo Ag negative,  - c/w Levofloxacin 500 mg Q 24 hours  - start IV Vancomycin   - Continue caspofungin 50mg q24h IV , will likely need empiric 14 day course. 2/18  - MRSA negative   - BCx (1/20): Staph hominis -  contaminant repeat BCx have been NGTD  - Failed FEES, put  NG tube for feedings and medications, pt'll likely need PEG, consulted by GI   - Aspiration precautions, order chest PT vest   - Scopolamine patch d/c on 2/14  - supplement oxygen, monitor pulse Ox, on high flow oxygen for weeks   - c/w duoneb  - pulmonary is following, recommendations noted  - TTE with bubble study to rule out shunting when stable     # Hypotension  -  IV hydration, started on Levophed today   - c/w   Midodrine  20 mg Q 8 hours   - keep MAP above 60     # Elevated Troponin , type 2 MI/  Sinus tachycardia  - c/w telemetry monitoring   - Repeat EKG: Normal sinus rhythm  - c/w  metoprolol  -  maintain fluid balance     # Seizure Disorder  - c/w  Keppra   - seizure precautions  - keep Mg above 2.0     # H/o lower ext DVT  - therapeutic Lovenox recommended by ICU team  - Eliquis held     # Hypothyroidism  - Thyroid Stimulating Hormone, Serum: 0.51 uIU/mL (01.21.24 @ 06:34)  - check FT4     # Normocytic Anemia   - monitor H/H, keep Hb above 7.5   - h/H trending down   - no active bleeding noted ( pt is on full AC)   - anemia work up     # Down Syndrome/  Cerebral Palsy  - supportive care  - prevent falls and aspiration   - failed speech and swallow, needs PEG   - on Raloxifene     # Full code    # GI prophylaxis       Pending (specify): start Levophed, keep MAP above 60, add Vancomycin, cw Meropenem,  c/w  Caspofungin, c/w Levofloxacin,  f/u anemia work up, keep Hb above 7.5,   pulmonary toilet, chest PT vest,  frequent suction,     supportive care,  daily CXR     Please,  contract  La Nena Vasquez [phone],  of consumer advisory board to discuss pt's code status and level of care.       overall prognosis is very poor, pt might benefit from comfort/end of life care in the nearest future.

## 2024-02-20 LAB
ALBUMIN SERPL ELPH-MCNC: 2.4 G/DL — LOW (ref 3.5–5.2)
ALP SERPL-CCNC: 100 U/L — SIGNIFICANT CHANGE UP (ref 30–115)
ALT FLD-CCNC: 11 U/L — SIGNIFICANT CHANGE UP (ref 0–41)
ANION GAP SERPL CALC-SCNC: 11 MMOL/L — SIGNIFICANT CHANGE UP (ref 7–14)
AST SERPL-CCNC: 13 U/L — SIGNIFICANT CHANGE UP (ref 0–41)
BASOPHILS # BLD AUTO: 0.07 K/UL — SIGNIFICANT CHANGE UP (ref 0–0.2)
BASOPHILS NFR BLD AUTO: 0.5 % — SIGNIFICANT CHANGE UP (ref 0–1)
BILIRUB SERPL-MCNC: <0.2 MG/DL — SIGNIFICANT CHANGE UP (ref 0.2–1.2)
BUN SERPL-MCNC: 12 MG/DL — SIGNIFICANT CHANGE UP (ref 10–20)
CALCIUM SERPL-MCNC: 8.1 MG/DL — LOW (ref 8.4–10.4)
CHLORIDE SERPL-SCNC: 99 MMOL/L — SIGNIFICANT CHANGE UP (ref 98–110)
CO2 SERPL-SCNC: 30 MMOL/L — SIGNIFICANT CHANGE UP (ref 17–32)
CREAT SERPL-MCNC: <0.5 MG/DL — LOW (ref 0.7–1.5)
EGFR: 115 ML/MIN/1.73M2 — SIGNIFICANT CHANGE UP
EOSINOPHIL # BLD AUTO: 0.49 K/UL — SIGNIFICANT CHANGE UP (ref 0–0.7)
EOSINOPHIL NFR BLD AUTO: 3.6 % — SIGNIFICANT CHANGE UP (ref 0–8)
GLUCOSE SERPL-MCNC: 222 MG/DL — HIGH (ref 70–99)
HCT VFR BLD CALC: 25.9 % — LOW (ref 37–47)
HGB BLD-MCNC: 7.6 G/DL — LOW (ref 12–16)
IMM GRANULOCYTES NFR BLD AUTO: 0.6 % — HIGH (ref 0.1–0.3)
LYMPHOCYTES # BLD AUTO: 1.17 K/UL — LOW (ref 1.2–3.4)
LYMPHOCYTES # BLD AUTO: 8.7 % — LOW (ref 20.5–51.1)
MAGNESIUM SERPL-MCNC: 1.9 MG/DL — SIGNIFICANT CHANGE UP (ref 1.8–2.4)
MCHC RBC-ENTMCNC: 23.3 PG — LOW (ref 27–31)
MCHC RBC-ENTMCNC: 29.3 G/DL — LOW (ref 32–37)
MCV RBC AUTO: 79.4 FL — LOW (ref 81–99)
MONOCYTES # BLD AUTO: 0.7 K/UL — HIGH (ref 0.1–0.6)
MONOCYTES NFR BLD AUTO: 5.2 % — SIGNIFICANT CHANGE UP (ref 1.7–9.3)
MRSA PCR RESULT.: NEGATIVE — SIGNIFICANT CHANGE UP
NEUTROPHILS # BLD AUTO: 10.93 K/UL — HIGH (ref 1.4–6.5)
NEUTROPHILS NFR BLD AUTO: 81.4 % — HIGH (ref 42.2–75.2)
NRBC # BLD: 0 /100 WBCS — SIGNIFICANT CHANGE UP (ref 0–0)
PHOSPHATE SERPL-MCNC: 2.3 MG/DL — SIGNIFICANT CHANGE UP (ref 2.1–4.9)
PLATELET # BLD AUTO: 584 K/UL — HIGH (ref 130–400)
PMV BLD: 10.8 FL — HIGH (ref 7.4–10.4)
POTASSIUM SERPL-MCNC: 3.8 MMOL/L — SIGNIFICANT CHANGE UP (ref 3.5–5)
POTASSIUM SERPL-SCNC: 3.8 MMOL/L — SIGNIFICANT CHANGE UP (ref 3.5–5)
PROCALCITONIN SERPL-MCNC: 0.16 NG/ML — HIGH (ref 0.02–0.1)
PROT SERPL-MCNC: 5.8 G/DL — LOW (ref 6–8)
RBC # BLD: 3.26 M/UL — LOW (ref 4.2–5.4)
RBC # FLD: 21.2 % — HIGH (ref 11.5–14.5)
SODIUM SERPL-SCNC: 140 MMOL/L — SIGNIFICANT CHANGE UP (ref 135–146)
WBC # BLD: 13.44 K/UL — HIGH (ref 4.8–10.8)
WBC # FLD AUTO: 13.44 K/UL — HIGH (ref 4.8–10.8)

## 2024-02-20 PROCEDURE — 99233 SBSQ HOSP IP/OBS HIGH 50: CPT

## 2024-02-20 PROCEDURE — 71045 X-RAY EXAM CHEST 1 VIEW: CPT | Mod: 26

## 2024-02-20 RX ORDER — SODIUM CHLORIDE 9 MG/ML
250 INJECTION, SOLUTION INTRAVENOUS ONCE
Refills: 0 | Status: COMPLETED | OUTPATIENT
Start: 2024-02-20 | End: 2024-02-20

## 2024-02-20 RX ADMIN — CASPOFUNGIN ACETATE 260 MILLIGRAM(S): 7 INJECTION, POWDER, LYOPHILIZED, FOR SOLUTION INTRAVENOUS at 10:15

## 2024-02-20 RX ADMIN — Medication 1 APPLICATION(S): at 06:21

## 2024-02-20 RX ADMIN — Medication 3 MILLILITER(S): at 11:14

## 2024-02-20 RX ADMIN — PANTOPRAZOLE SODIUM 40 MILLIGRAM(S): 20 TABLET, DELAYED RELEASE ORAL at 06:11

## 2024-02-20 RX ADMIN — POLYETHYLENE GLYCOL 3350 17 GRAM(S): 17 POWDER, FOR SOLUTION ORAL at 21:24

## 2024-02-20 RX ADMIN — Medication 1 DROP(S): at 06:21

## 2024-02-20 RX ADMIN — SODIUM CHLORIDE 4 MILLILITER(S): 9 INJECTION INTRAMUSCULAR; INTRAVENOUS; SUBCUTANEOUS at 11:19

## 2024-02-20 RX ADMIN — GABAPENTIN 300 MILLIGRAM(S): 400 CAPSULE ORAL at 17:23

## 2024-02-20 RX ADMIN — Medication 3 MILLILITER(S): at 19:49

## 2024-02-20 RX ADMIN — MIDODRINE HYDROCHLORIDE 20 MILLIGRAM(S): 2.5 TABLET ORAL at 06:12

## 2024-02-20 RX ADMIN — CHLORHEXIDINE GLUCONATE 1 APPLICATION(S): 213 SOLUTION TOPICAL at 12:41

## 2024-02-20 RX ADMIN — Medication 250 MILLIGRAM(S): at 17:27

## 2024-02-20 RX ADMIN — Medication 2 SPRAY(S): at 13:19

## 2024-02-20 RX ADMIN — ENOXAPARIN SODIUM 50 MILLIGRAM(S): 100 INJECTION SUBCUTANEOUS at 06:15

## 2024-02-20 RX ADMIN — GABAPENTIN 300 MILLIGRAM(S): 400 CAPSULE ORAL at 06:12

## 2024-02-20 RX ADMIN — Medication 3 MILLILITER(S): at 08:20

## 2024-02-20 RX ADMIN — SENNA PLUS 2 TABLET(S): 8.6 TABLET ORAL at 21:24

## 2024-02-20 RX ADMIN — Medication 250 MILLIGRAM(S): at 06:16

## 2024-02-20 RX ADMIN — LEVETIRACETAM 500 MILLIGRAM(S): 250 TABLET, FILM COATED ORAL at 06:11

## 2024-02-20 RX ADMIN — MEROPENEM 100 MILLIGRAM(S): 1 INJECTION INTRAVENOUS at 06:19

## 2024-02-20 RX ADMIN — Medication 1 TABLET(S): at 06:16

## 2024-02-20 RX ADMIN — MIDODRINE HYDROCHLORIDE 20 MILLIGRAM(S): 2.5 TABLET ORAL at 17:24

## 2024-02-20 RX ADMIN — LEVETIRACETAM 500 MILLIGRAM(S): 250 TABLET, FILM COATED ORAL at 17:22

## 2024-02-20 RX ADMIN — Medication 4 MILLILITER(S): at 11:17

## 2024-02-20 RX ADMIN — RALOXIFENE HYDROCHLORIDE 60 MILLIGRAM(S): 60 TABLET, COATED ORAL at 12:41

## 2024-02-20 RX ADMIN — SODIUM CHLORIDE 4 MILLILITER(S): 9 INJECTION INTRAMUSCULAR; INTRAVENOUS; SUBCUTANEOUS at 19:49

## 2024-02-20 RX ADMIN — Medication 3 MILLIGRAM(S): at 21:24

## 2024-02-20 RX ADMIN — Medication 2 SPRAY(S): at 21:23

## 2024-02-20 RX ADMIN — MIDODRINE HYDROCHLORIDE 20 MILLIGRAM(S): 2.5 TABLET ORAL at 12:41

## 2024-02-20 RX ADMIN — Medication 4 MILLILITER(S): at 19:50

## 2024-02-20 RX ADMIN — MEROPENEM 100 MILLIGRAM(S): 1 INJECTION INTRAVENOUS at 13:21

## 2024-02-20 RX ADMIN — Medication 250 MILLIGRAM(S): at 17:24

## 2024-02-20 RX ADMIN — Medication 1 TABLET(S): at 17:23

## 2024-02-20 RX ADMIN — MEROPENEM 100 MILLIGRAM(S): 1 INJECTION INTRAVENOUS at 21:24

## 2024-02-20 RX ADMIN — Medication 1 DROP(S): at 17:23

## 2024-02-20 RX ADMIN — Medication 2 SPRAY(S): at 06:11

## 2024-02-20 RX ADMIN — Medication 4.9 MICROGRAM(S)/KG/MIN: at 07:39

## 2024-02-20 RX ADMIN — SODIUM CHLORIDE 500 MILLILITER(S): 9 INJECTION, SOLUTION INTRAVENOUS at 12:41

## 2024-02-20 RX ADMIN — ENOXAPARIN SODIUM 50 MILLIGRAM(S): 100 INJECTION SUBCUTANEOUS at 17:23

## 2024-02-20 NOTE — PROGRESS NOTE ADULT - ASSESSMENT
IMPRESSION:    Acute hypoxemic respiratory failure  Likely aspiration pneumonia   Sepsis POA  Septic shock off Levophed   Recurrent aspiration pneumonia / prior intubation  HO DVT on Eliquis   HO GI bleed  HO OM  HO recent duodenal perforation   HO polymicrobial bacteremia   H/o CP, DS  H/o seizures    PLAN:    CNS:  Avoid CNS Depressant, AED. MS at baseline.     HEENT: Oral care.     PULMONARY: HOB at 45 degrees.  Aspiration precaution. Wean FiO2 Keep spo2 more than 92%.  Daily CXR. Aggressive pulm toilet, frequent suctioning. Chest vest. Might need MV. CXR noted.     CARDIOVASCULAR: Keep MAP more than 60. Avoid overload. C/w midodrine 20mg.  Goal directed fluid resuscitation.    GI: Protonix. NG  feeding. Speech and swallow recs noted. Might need PEG when more stable    INFECTIOUS DISEASE:  C/w Meropenem.  Finish empiric Caspo course 2/18. Repeat blood Cx, serum galactomannan. Histo Ag negative, ID follow up. Repeat RVP 2/17 negative. Repeat BCx, procal, and UA.     HEMATOLOGICAL:  Lovenox therapeutic.  Monitor CBC    ENDOCRINE:  Follow up FS.  Insulin protocol if needed.    MUSCULOSKELETAL: Bedrest.  Off loading.  Wound care.    Prognosis very poor.    Palliative care following    SDU.

## 2024-02-20 NOTE — PROGRESS NOTE ADULT - ASSESSMENT
58F w/ PMHx Down Syndrome, nonverbal at baseline, Hypothyroidism, Cerebral palsy and Seizure Disorders presents to the ED from nursing facility/group home ([facility]) presented to ED for respiratory distress and high grade fever. Patient found to be septic on admission.Admitted to SDU for management of acute respiratory distress 2/2 CAP/Aspiration PNA (20 Jan 2024 18:22)  While pt was on the floor she developed dyspnea after swallow evaluation, was spiking high grade fever, consulted by pulmonary/critical care and was upgraded back to SDU.    A/P  # Sepsis POA / Acute hypoxic resp failure / RSV bronchiolitis /  H/o Dysphagia/ suspected aspiration PNA /high grade fever   - CXR noted, worsening opacities   - aggressive chest PT with vest, aspiration precautions and suction   - pt is spiking high grade fever, , started on Levophed again on 2/19,  keep MAP above 60  - ID followed up, recommendations noted  - completed the course of   meropenem  15 days on 2/16, Meropenem was resumed on 2/18 ( as per prior ID recommendations)  - c/w Levofloxacin 500 mg Q 24 hours  - started on  IV Vancomycin , repeat  nasal swab for MRSA if negative d/c Vanc   - Continue caspofungin 50mg q24h IV , will likely need empiric 14 day course. 2/18  - yuen CT recommended, consider Galium scan   - BCx (1/20): Staph hominis -  contaminant repeat BCx have been NGTD  - Failed FEES, put  NG tube for feedings and medications, pt'll likely need PEG, consulted by GI   - Aspiration precautions, order chest PT vest   - Scopolamine patch d/c on 2/14  - supplement oxygen, monitor pulse Ox, on high flow oxygen for weeks   - c/w duoneb  - pulmonary is following, recommendations noted  - TTE with bubble study to rule out shunting when stable     # Hypotension  -  c/w  Levophed and    Midodrine  20 mg Q 8 hours   - keep MAP above 60     # Elevated Troponin , type 2 MI/  Sinus tachycardia  - c/w telemetry monitoring   - Repeat EKG: Normal sinus rhythm  - c/w  metoprolol  -  maintain fluid balance     # Seizure Disorder  - c/w  Keppra   - seizure precautions  - keep Mg above 2.0     # H/o lower ext DVT  - therapeutic Lovenox recommended by ICU team  - Eliquis held     # Hypothyroidism  - Thyroid Stimulating Hormone, Serum: 0.51 uIU/mL (01.21.24 @ 06:34)  - check FT4     # Normocytic Anemia   - monitor H/H, keep Hb above 7.5   - h/H trending down   - no active bleeding noted ( pt is on full AC)   - anemia work up     # Down Syndrome/  Cerebral Palsy  - supportive care  - prevent falls and aspiration   - failed speech and swallow, needs PEG   - on Raloxifene     # Full code    # GI prophylaxis       Pending (specify):  recheck nasal swab for MRSA, if negative d/c Vanco, get pan CT ( chest, abdomen/pelvis, consider Galium scan), c/w  Levophed, keep MAP above 60,   f/u anemia work up, keep Hb above 7.5,   pulmonary toilet, chest PT vest,  frequent suction,  supportive care,  daily CXR     Progress note from 2/19 faxed to   of consumer advisory board for review, pt's overall prognosis is very poor, pt will  benefit from comfort/end of life care in the nearest future.

## 2024-02-20 NOTE — PROGRESS NOTE ADULT - ASSESSMENT
58F w/ PMHx Down Syndrome, nonverbal at baseline, Hypothyroidism, Cerebral palsy and Seizure Disorders [facility] ED [facility] presented to ED for respiratory distress and high grade fever. Patient found to be septic on admission. Admitted to SDU for management of acute respiratory distress 2/2 CAP/Aspiration PNA (20 Jan 2024 18:22)  While pt was on the floor she developed dyspnea after swallow evaluation, was spiking high grade fever, consulted by pulmonary/critical care and was upgraded back to SDU.    A/P  # Sepsis POA / Acute hypoxic resp failure / RSV bronchiolitis /  H/o Dysphagia/ suspected aspiration PNA /high grade fever   - CXR noted 2/20, stable left large opacities and small right opacity    - completed the course of  meropenem 15 days on 2/16, Meropenem was resumed 2/18 ( as per prior ID recommendations), pt is spiking high grade fever and wbc uptrending.   - Histo Ag negative, only positive blood cx is for coag -ve staph  - currently on HFNC 60L FiO2 80%  - hypotensive again 2/19 despite fluid boluses so started levophed on 2/19 - c/w   Midodrine  20 mg Q 8 hours   - MRSA negative   - BCx (1/20): Staph hominis -  contaminant repeat BCx have been NGTD  - procal downtrending 0.2--0.16  - bolused 250 LR 2/20, levophed requirement is 0.1    plan:  - c/w levophed and midodrine.   - full rvp, UA, Blood Cx, CXR, fungitell,  - ID 2/20: Repeat fungitell, Repeat MRSA nares- if (-) then D/C vancomycin, Recommend CT CAP, PO levaquin 500mg daily x 7 days end 2/21. Restarted on Meropenem 2/18 given fever/pressors , s/p extensive course. (1/21-1/27), 2/1-2/16, restarted 2/18 , on pressors , critical illness. Fungitell started downtrending 1 day after Caspo started, doubt related, but at this point given critical illness would plan on completing a course. Continue caspofungin 50mg q24h IV , hx unclear elevated fungitell. No clear source, ( empiric 14 day course was 2/18) No risk factors for PCP or other invasive fungal infection . Continuing for now.  - Failed FEES, put NG tube for feedings and medications, pt'll likely need PEG/trach when stable, consulted by GI   - Aspiration precautions, order chest PT vest   - Scopolamine patch d/c on 2/14  - supplement oxygen, monitor pulse Ox, on high flow oxygen for weeks   - c/w duoneb  - pulmonary is following, recommendations noted  - aggressive chest PT with vest, aspiration precautions and suction     # Elevated Troponin , type 2 MI/  Sinus tachycardia  - c/w telemetry monitoring   - Repeat EKG: Normal sinus rhythm  - c/w  metoprolol  -  maintain fluid balance     # Seizure Disorder  - c/w  Keppra   - seizure precautions  - keep Mg above 2.0     # H/o lower ext DVT  - therapeutic Lovenox recommended by ICU team  - Eliquis held     # Hypothyroidism  - Thyroid Stimulating Hormone, Serum: 0.51 uIU/mL (01.21.24 @ 06:34)  - check FT4     # Normocytic Anemia, anemia of chronic disease   - monitor H/H, keep Hb above 7.5   - h/H trending down   - no active bleeding noted ( pt is on full AC)   - get anemia work up     # Down Syndrome/  Cerebral Palsy  - supportive care  - prevent falls and aspiration   - failed speech and swallow, needs PEG   - on Raloxifene     # Full code  - conversation started with patient's representative and progress note faxed to start considering DNR/DNI. for now full code.   La Nena Vasquez [phone]          Pending (specify): f/u  sepsis work up and ID,  pulmonary toilet, chest PT vest,  frequent suction, , pt needs PEG if becomes stable, daily CXR ,     overall prognosis is very poor, pt might benefit from comfort/end of life care in the nearest future.         58F w/ PMHx Down Syndrome, nonverbal at baseline, Hypothyroidism, Cerebral palsy and Seizure Disorders [facility] presented to ED for respiratory distress and high grade fever. Patient found to be septic on admission. Admitted to SDU for management of acute respiratory distress 2/2 CAP/Aspiration PNA (20 Jan 2024 18:22)  While pt was on the floor she developed dyspnea after swallow evaluation, was spiking high grade fever, consulted by pulmonary/critical care and was upgraded back to SDU.    A/P  # Sepsis POA / Acute hypoxic resp failure / RSV bronchiolitis /  H/o Dysphagia/ suspected aspiration PNA /high grade fever   - CXR noted 2/20, stable left large opacities and small right opacity    - completed the course of  meropenem 15 days on 2/16, Meropenem was resumed 2/18 ( as per prior ID recommendations), pt is spiking high grade fever and wbc uptrending.   - Histo Ag negative, only positive blood cx is for coag -ve staph  - currently on HFNC 60L FiO2 80%  - hypotensive again 2/19 despite fluid boluses so started levophed on 2/19 - c/w   Midodrine  20 mg Q 8 hours   - MRSA negative   - BCx (1/20): Staph hominis -  contaminant repeat BCx have been NGTD  - procal downtrending 0.2--0.16  - bolused 250 LR 2/20, levophed requirement is 0.1    plan:  - c/w levophed and midodrine.   - full rvp, UA, Blood Cx, CXR, fungitell,  - ID 2/20: Repeat fungitell, Repeat MRSA nares- if (-) then D/C vancomycin, Recommend CT CAP, PO levaquin 500mg daily x 7 days end 2/21. Restarted on Meropenem 2/18 given fever/pressors , s/p extensive course. (1/21-1/27), 2/1-2/16, restarted 2/18 , on pressors , critical illness. Fungitell started downtrending 1 day after Caspo started, doubt related, but at this point given critical illness would plan on completing a course. Continue caspofungin 50mg q24h IV , hx unclear elevated fungitell. No clear source, ( empiric 14 day course was 2/18) No risk factors for PCP or other invasive fungal infection . Continuing for now.  - Failed FEES, put NG tube for feedings and medications, pt'll likely need PEG/trach when stable, consulted by GI   - Aspiration precautions, order chest PT vest   - Scopolamine patch d/c on 2/14  - supplement oxygen, monitor pulse Ox, on high flow oxygen for weeks   - c/w duoneb  - pulmonary is following, recommendations noted  - aggressive chest PT with vest, aspiration precautions and suction     # Elevated Troponin , type 2 MI/  Sinus tachycardia  - c/w telemetry monitoring   - Repeat EKG: Normal sinus rhythm  - c/w  metoprolol  -  maintain fluid balance   - TTE 2/19 normal EF no DD or valve abnormalities    # Seizure Disorder  - c/w  Keppra   - seizure precautions  - keep Mg above 2.0     # H/o lower ext DVT  - therapeutic Lovenox recommended by ICU team  - Eliquis held     # Hypothyroidism  - Thyroid Stimulating Hormone, Serum: 0.51 uIU/mL (01.21.24 @ 06:34)  - check FT4     # Normocytic Anemia, anemia of chronic disease   - monitor H/H, keep Hb above 7.5   - h/H trending down   - no active bleeding noted ( pt is on full AC)   - get anemia work up     # Down Syndrome/  Cerebral Palsy  - supportive care  - prevent falls and aspiration   - failed speech and swallow, needs PEG   - on Raloxifene     # Full code  - conversation started with patient's representative and progress note faxed to start considering DNR/DNI. for now full code.   La Nena Vasquez [phone]          Pending (specify): f/u  sepsis work up and ID,  pulmonary toilet, chest PT vest,  frequent suction, , pt needs PEG if becomes stable, daily CXR ,     overall prognosis is very poor, pt might benefit from comfort/end of life care in the nearest future.         58F w/ PMHx Down Syndrome, nonverbal at baseline, Hypothyroidism, Cerebral palsy and Seizure Disorders [facility] presented to ED for respiratory distress and high grade fever. Patient found to be septic on admission. Admitted to SDU for management of acute respiratory distress 2/2 CAP/Aspiration PNA (20 Jan 2024 18:22)  While pt was on the floor she developed dyspnea after swallow evaluation, was spiking high grade fever, consulted by pulmonary/critical care and was upgraded back to SDU.    A/P  # Sepsis POA / Acute hypoxic resp failure / RSV bronchiolitis /  H/o Dysphagia/ suspected aspiration PNA /high grade fever   - CXR noted 2/20, stable left large opacities and small right opacity    - completed the course of  meropenem 15 days on 2/16, Meropenem was resumed 2/18 ( as per prior ID recommendations), pt is spiking high grade fever and wbc uptrending.   - Histo Ag negative, only positive blood cx is for coag -ve staph  - currently on HFNC 60L FiO2 80%  - hypotensive again 2/19 despite fluid boluses so started levophed on 2/19 - c/w   Midodrine  20 mg Q 8 hours   - MRSA negative   - BCx (1/20): Staph hominis -  contaminant repeat BCx have been NGTD  - procal downtrending 0.2--0.16  - bolused 250 LR 2/20, levophed requirement is 0.1    plan:  - c/w levophed and midodrine.   - full rvp, UA, Blood Cx, CXR, fungitell, pan CT for hidden infection source   - ID 2/20: Repeat fungitell, Repeat MRSA nares- if (-) then D/C vancomycin, Recommend CT CAP, PO levaquin 500mg daily x 7 days end 2/21. Restarted on Meropenem 2/18 given fever/pressors , s/p extensive course. (1/21-1/27), 2/1-2/16, restarted 2/18 , on pressors , critical illness. Fungitell started downtrending 1 day after Caspo started, doubt related, but at this point given critical illness would plan on completing a course. Continue caspofungin 50mg q24h IV , hx unclear elevated fungitell. No clear source, ( empiric 14 day course was 2/18) No risk factors for PCP or other invasive fungal infection . Continuing for now.  - Failed FEES, put NG tube for feedings and medications, pt'll likely need PEG/trach when stable, consulted by GI   - Aspiration precautions, order chest PT vest   - Scopolamine patch d/c on 2/14  - supplement oxygen, monitor pulse Ox, on high flow oxygen for weeks   - c/w duoneb  - pulmonary is following, recommendations noted  - aggressive chest PT with vest, aspiration precautions and suction     # Elevated Troponin , type 2 MI/  Sinus tachycardia  - c/w telemetry monitoring   - Repeat EKG: Normal sinus rhythm  - c/w  metoprolol  -  maintain fluid balance   - TTE 2/19 normal EF no DD or valve abnormalities    # Seizure Disorder  - c/w  Keppra   - seizure precautions  - keep Mg above 2.0     # H/o lower ext DVT  - therapeutic Lovenox recommended by ICU team  - Eliquis held     # Hypothyroidism  - Thyroid Stimulating Hormone, Serum: 0.51 uIU/mL (01.21.24 @ 06:34)  - check FT4     # Normocytic Anemia, anemia of chronic disease   - monitor H/H, keep Hb above 7.5   - h/H trending down   - no active bleeding noted ( pt is on full AC)   - get anemia work up     # Down Syndrome/  Cerebral Palsy  - supportive care  - prevent falls and aspiration   - failed speech and swallow, needs PEG   - on Raloxifene     # Full code  - conversation started with patient's representative and progress note faxed to start considering DNR/DNI. for now full code.   La Nena Vasquez [phone]          Pending (specify): f/u  sepsis work up and ID,  pulmonary toilet, chest PT vest,  frequent suction, , pt needs PEG if becomes stable, daily CXR ,     overall prognosis is very poor, pt might benefit from comfort/end of life care in the nearest future.

## 2024-02-20 NOTE — PROGRESS NOTE ADULT - SUBJECTIVE AND OBJECTIVE BOX
JERICHO TEA  58y, Female  Allergy: No Known Allergies      LOS  31d    CHIEF COMPLAINT: Respiratory Distress (20 Feb 2024 10:47)      INTERVAL EVENTS/HPI  - T(F): , Max: 100.4 (02-19-24 @ 16:06), fevers, on pressors  - WBC Count: 13.44 (02-20-24 @ 05:32)  WBC Count: 13.98 (02-19-24 @ 05:29)     - Creatinine: <0.5 (02-20-24 @ 05:32)  Creatinine: 0.5 (02-19-24 @ 05:29)     - Procalcitonin, Serum: 0.16 ng/mL (02-19-24 @ 16:00)    -   -     ROS  cannot obtain secondary to patient's sedation and/or mental status    VITALS:  T(F): 98.9, Max: 100.4 (02-19-24 @ 16:06)  HR: 58  BP: 92/61  RR: 18Vital Signs Last 24 Hrs  T(C): 37.2 (20 Feb 2024 11:34), Max: 38 (19 Feb 2024 16:06)  T(F): 98.9 (20 Feb 2024 11:34), Max: 100.4 (19 Feb 2024 16:06)  HR: 58 (20 Feb 2024 11:34) (58 - 97)  BP: 92/61 (20 Feb 2024 11:34) (91/54 - 128/71)  BP(mean): 68 (20 Feb 2024 11:34) (66 - 79)  RR: 18 (20 Feb 2024 11:34) (18 - 20)  SpO2: 96% (20 Feb 2024 11:34) (90% - 100%)    Parameters below as of 20 Feb 2024 11:34  Patient On (Oxygen Delivery Method): nasal cannula, high flow  O2 Flow (L/min): 60  O2 Concentration (%): 90    PHYSICAL EXAM:  Gen: chronically ill appearing , HFNC , contracted  HEENT: Normocephalic, atraumatic  Neck: supple, no lymphadenopathy  CV: Regular rate & regular rhythm  Lungs: decreased BS at bases, no fremitus  Abdomen: Soft, BS present  Ext: Warm, well perfused  Sacrum deferred    Neuro: non focal, not following commands  Skin: no rash, no erythema  Lines: no phlebitis   FH: Non-contributory  Social Hx: Non-contributory    TESTS & MEASUREMENTS:                        7.6    13.44 )-----------( 584      ( 20 Feb 2024 05:32 )             25.9     02-20    140  |  99  |  12  ----------------------------<  222<H>  3.8   |  30  |  <0.5<L>    Ca    8.1<L>      20 Feb 2024 05:32  Phos  2.3     02-20  Mg     1.9     02-20    TPro  5.8<L>  /  Alb  2.4<L>  /  TBili  <0.2  /  DBili  x   /  AST  13  /  ALT  11  /  AlkPhos  100  02-20      LIVER FUNCTIONS - ( 20 Feb 2024 05:32 )  Alb: 2.4 g/dL / Pro: 5.8 g/dL / ALK PHOS: 100 U/L / ALT: 11 U/L / AST: 13 U/L / GGT: x           Urinalysis Basic - ( 20 Feb 2024 05:32 )    Color: x / Appearance: x / SG: x / pH: x  Gluc: 222 mg/dL / Ketone: x  / Bili: x / Urobili: x   Blood: x / Protein: x / Nitrite: x   Leuk Esterase: x / RBC: x / WBC x   Sq Epi: x / Non Sq Epi: x / Bacteria: x        Culture - Blood (collected 02-17-24 @ 20:41)  Source: .Blood None  Preliminary Report (02-20-24 @ 06:01):    No growth at 48 Hours    Culture - Blood (collected 02-12-24 @ 11:17)  Source: .Blood None  Final Report (02-17-24 @ 21:00):    No growth at 5 days    Culture - Blood (collected 02-09-24 @ 11:57)  Source: .Blood None  Final Report (02-14-24 @ 22:00):    No growth at 5 days    Culture - Blood (collected 02-09-24 @ 11:57)  Source: .Blood None  Final Report (02-14-24 @ 22:00):    No growth at 5 days    Culture - Blood (collected 02-03-24 @ 11:13)  Source: .Blood None  Final Report (02-08-24 @ 20:00):    No growth at 5 days    Culture - Blood (collected 02-01-24 @ 11:58)  Source: .Blood None  Final Report (02-06-24 @ 23:06):    No growth at 5 days    Culture - Urine (collected 02-01-24 @ 11:40)  Source: Clean Catch Clean Catch (Midstream)  Final Report (02-03-24 @ 00:06):    >=3 organisms. Probable collection contamination.    Culture - Blood (collected 01-31-24 @ 18:21)  Source: .Blood None  Final Report (02-06-24 @ 01:01):    No growth at 5 days    Culture - Blood (collected 01-31-24 @ 18:21)  Source: .Blood None  Final Report (02-06-24 @ 01:01):    No growth at 5 days    Culture - Urine (collected 01-23-24 @ 05:20)  Source: Clean Catch Clean Catch (Midstream)  Final Report (01-25-24 @ 00:26):    No growth    Culture - Blood (collected 01-22-24 @ 16:51)  Source: .Blood None  Final Report (01-28-24 @ 01:00):    No growth at 5 days            INFECTIOUS DISEASES TESTING  Procalcitonin, Serum: 0.16 (02-19-24 @ 16:00)  Procalcitonin, Serum: 0.20 (02-19-24 @ 11:23)  Rapid RVP Result: NotDetec (02-17-24 @ 19:06)  Procalcitonin, Serum: 0.11 (02-17-24 @ 10:41)  MRSA PCR Result.: Negative (02-15-24 @ 06:20)  Procalcitonin, Serum: 0.10 (02-12-24 @ 11:17)  Rapid RVP Result: NotDetec (02-12-24 @ 10:28)  MRSA PCR Result.: Negative (02-12-24 @ 10:28)  Fungitell: 63 (02-06-24 @ 11:27)  Fungitell: 65 (02-06-24 @ 04:43)  Rapid RVP Result: NotDetec (02-04-24 @ 10:28)  MRSA PCR Result.: Negative (02-03-24 @ 21:32)  Procalcitonin, Serum: 0.15 (02-03-24 @ 11:13)  Procalcitonin, Serum: 0.22 (02-01-24 @ 16:00)  MRSA PCR Result.: Negative (01-22-24 @ 05:30)  Legionella Antigen, Urine: Negative (01-21-24 @ 05:00)  Rapid RVP Result: Detected (01-21-24 @ 00:58)  Procalcitonin, Serum: 0.51 (01-20-24 @ 21:23)  Fungitell: 325 (01-20-24 @ 21:23)  Fungitell: 138 (11-07-23 @ 08:08)  Fungitell: 115 (11-02-23 @ 11:40)  Procalcitonin, Serum: 0.04 (11-02-23 @ 11:40)  Procalcitonin, Serum: 0.26 (10-24-23 @ 11:00)  MRSA PCR Result.: Negative (10-17-23 @ 17:20)  Fungitell: >500 (10-17-23 @ 17:20)  Procalcitonin, Serum: 4.36 (10-17-23 @ 17:20)  Procalcitonin, Serum: 4.18 (10-17-23 @ 12:35)  Procalcitonin, Serum: 0.07 (10-15-23 @ 07:28)  COVID-19 PCR: NotDetec (10-12-23 @ 09:14)  Procalcitonin, Serum: 0.40 (10-07-23 @ 10:54)  Legionella Antigen, Urine: Negative (09-30-23 @ 14:36)  MRSA PCR Result.: Negative (09-29-23 @ 16:50)  Procalcitonin, Serum: 9.78 (08-12-23 @ 11:25)  MRSA PCR Result.: Negative (08-12-23 @ 06:10)  Rapid RVP Result: NotDetec (08-12-23 @ 01:33)  Procalcitonin, Serum: 0.63 (08-10-23 @ 08:46)  Procalcitonin, Serum: 7.82 (08-04-23 @ 16:13)  MRSA PCR Result.: Negative (08-04-23 @ 14:52)  Rapid RVP Result: NotDetec (08-04-23 @ 03:00)      INFLAMMATORY MARKERS  Sedimentation Rate, Erythrocyte: 100 mm/Hr (02-03-24 @ 11:13)  C-Reactive Protein, Serum: 214.2 mg/L (02-03-24 @ 11:13)  C-Reactive Protein, Serum: 132.3 mg/L (02-01-24 @ 16:00)  Sedimentation Rate, Erythrocyte: 67 mm/Hr (10-15-23 @ 07:28)      RADIOLOGY & ADDITIONAL TESTS:  I have personally reviewed the last available Chest xray  CXR      CT      CARDIOLOGY TESTING      MEDICATIONS  acetylcysteine 20%  Inhalation 4  albuterol/ipratropium for Nebulization 3  artificial  tears Solution 1  calcium carbonate   1250 mG (OsCal) 1  caspofungin IVPB 50  caspofungin IVPB   chlorhexidine 2% Cloths 1  enoxaparin Injectable 50  gabapentin 300  lactated ringers Bolus 250  levETIRAcetam  Solution 500  levoFLOXacin  Tablet 500  melatonin 3  meropenem  IVPB 1000  midodrine. 20  norepinephrine Infusion 0.05  pantoprazole  Injectable 40  polyethylene glycol 3350 17  raloxifene 60  saccharomyces boulardii 250  senna 2  silver sulfADIAZINE 1% Cream 1  sodium chloride 0.65% Nasal 2  sodium chloride 3%  Inhalation 4  vancomycin  IVPB 750      WEIGHT  Weight (kg): 52.3 (01-21-24 @ 08:00)  Creatinine: <0.5 mg/dL (02-20-24 @ 05:32)      ANTIBIOTICS:  caspofungin IVPB      caspofungin IVPB 50 milliGRAM(s) IV Intermittent every 24 hours  levoFLOXacin  Tablet 500 milliGRAM(s) Oral every 24 hours  meropenem  IVPB 1000 milliGRAM(s) IV Intermittent every 8 hours  vancomycin  IVPB 750 milliGRAM(s) IV Intermittent every 12 hours      All available historical records have been reviewed

## 2024-02-20 NOTE — PROGRESS NOTE ADULT - ASSESSMENT
ASSESSMENT  58F w/ PMHx Down Syndrome, nonverbal at baseline, Hypothyroidism, Cerebral palsy and Seizure Disorders presents to the ED from nursing facility/group home presented to ED for respiratory distress and high grade fever.     IMPRESSION  #Fevers, resolving , HAP / aspiration     Procalcitonin, Serum: 0.16 (02-19-24 @ 16:00)- unremarkable     2/17 BCX NGTD     Rapid RVP Result: NotDetec (02-17-24 @ 19:06)    2/12 BCX NGTD    Procalcitonin, Serum: 0.10 (02-12-24 @ 11:17)    Rapid RVP Result: NotDetec (02-12-24 @ 10:28)    MRSA PCR Result.: Negative (02-12-24 @ 10:28)    2/9 BCX NGTD x2    2/3 BCX NGTD     2/1 BCX NGTD     2/1 UCX   >=3 organisms. Probable collection contamination.    1/31 BCX NG    Rapid RVP Result: NotDetec (02-04-24 @ 10:28)    MRSA PCR Result.: Negative (02-03-24 @ 21:32)     UA without significant pyuria   Procalcitonin, Serum: 0.22 (02-01-24 @ 16:00)--> Procalcitonin, Serum: 0.15 (02-03-24 @ 11:13), downtrending ; unremarkable   MRSA PCR Result.: Negative (01-22-24 @ 05:30)  < from: Xray Chest 1 View-PORTABLE IMMEDIATE (Xray Chest 1 View-PORTABLE IMMEDIATE .) (02.01.24 @ 03:02) >  Bibasal opacities without significant change.   #Acute hypoxic respiratory failure- Gram Negative pneumonia   #1/20 BCX 1/4 bottles : Staphylococcus hominis- contaminant   Repeat CX NG   #Severe Sepsis on admission  #RSV + 1/21  #Elevated fungitell   serum aspergillus galactomannan and histo are (-)  Fungitell: 63 (02-06-24 @ 11:27)  Fungitell: 325 (01-20-24 @ 21:23)  Fungitell: 138 (11-07-23 @ 08:08)  Fungitell: 115 (11-02-23 @ 11:40)  Fungitell: >500 pg/mL (10.17.23 @ 17:20) s/p empiric caspo   #Full thickness ulcer right heel- Appreciate burn/podiatry evaluation.  #History of Right planter foot ulcers - two full thickness ulcers - serous drainage with mild erythema with OM  - MR Foot No Cont, Right (10.16.23 @ 21:51): IMPRESSION: 1.  Limited exam. 2.  Osteomyelitis of the first metatarsal stump. 3.  Osteomyelitis of the second toe distal phalanx.  - s/p excidional debridement to and including bone 1st metatarsal with partial 2nd digit amputation - 1st metatarsal head resected - Wound Cx Proteus ESBL   #CKD 2-3 Creatinine: 0.7 mg/dL (02.02.24 @ 04:59)      #History of buttock ulcer  #Down syndrome/Cerebral Palsy     RECOMMENDATIONS  - Repeat fungitell  - Repeat MRSA nares- if (-) then D/C vancomycin  IVPB 750 milliGRAM(s) IV Intermittent every 12 hours as BCX NG   - Recommend CT CAP w/   - PO levaquin 500mg daily x 7 days end 2/21  - Restarted on Meropenem 2/18 given fever/pressors , s/p extensive course. (1/21-1/27), 2/1-2/16, restarted 2/18 , on pressors , critical illness  - Fungitell started downtrending 1 day after Caspo started, doubt related, but at this point given critical illness would plan on completing a course  - Continue caspofungin 50mg q24h IV , hx unclear elevated fungitell. No clear source, ( empiric 14 day course was 2/18) No risk factors for PCP or other invasive fungal infection . Continuing for now  - Grave prognosis, GOC , aggressive care is futile    If any questions, please send a message or call on [employer] Teams  Please continue to update ID with any pertinent new laboratory or radiographic findings.

## 2024-02-20 NOTE — PROGRESS NOTE ADULT - SUBJECTIVE AND OBJECTIVE BOX
58F w/ PMHx Down Syndrome, nonverbal at baseline, Hypothyroidism, Cerebral palsy and Seizure Disorders presents to the ED from nursing facility/group home ([facility]) presented to ED for respiratory distress and high grade fever. Patient found to be septic on admission.Admitted to SDU for management of acute respiratory distress 2/2 CAP/Aspiration PNA   While pt was on the floor she developed dyspnea after swallow evaluation, was spiking high grade fever, consulted by pulmonary/critical care and was upgraded back to SDU.  Pt developed left lung white out, improved after aggressive chest PT, treated with Meropenem and caspofungin, ID is following, her overall prognosis is very poor, she might need trach and PEG in the nearest future , a meeting with facility staff and state took place on 2/14.   Pt completed course o Meropenem, Levofloxacin added on 2/15 ( pt spiked fever), Meropenem resumed on 2/18,  she was tapered off pressure support, still on high flow oxygen. While in SDU  Hb dropped without acute blood loss, will send anemia work up and monitor closely ( pt is o full AC).   In last 24 hours pt is spiking high grade fever, will repeat sepsis work up, check RVP, start IV fluids, call ID for a follow up.   On Tuesday will contract advisory board and discuss level of care.   Today pt is awake, anxious, febrile, tachycardic.       Currently admitted to medicine with the primary diagnosis of Sepsis with acute hypoxic respiratory failure    Today is hospital day 31d.     PAST MEDICAL & SURGICAL HISTORY  Down syndrome    Osteoporosis    Mild anemia    Neuropathy    S/P debridement  of R hip on 3/2/21        ALLERGIES:  No Known Allergies    MEDICATIONS:  ACTIVE MEDICATIONS  acetaminophen     Tablet .. 650 milliGRAM(s) Oral every 6 hours PRN  acetylcysteine 20%  Inhalation 4 milliLiter(s) Inhalation every 4 hours  albuterol/ipratropium for Nebulization 3 milliLiter(s) Nebulizer every 4 hours  aluminum hydroxide/magnesium hydroxide/simethicone Suspension 30 milliLiter(s) Oral every 4 hours PRN  artificial  tears Solution 1 Drop(s) Both EYES two times a day  calcium carbonate   1250 mG (OsCal) 1 Tablet(s) Oral two times a day  caspofungin IVPB 50 milliGRAM(s) IV Intermittent every 24 hours  caspofungin IVPB      chlorhexidine 2% Cloths 1 Application(s) Topical daily  enoxaparin Injectable 50 milliGRAM(s) SubCutaneous every 12 hours  gabapentin 300 milliGRAM(s) Oral every 12 hours  levETIRAcetam  Solution 500 milliGRAM(s) Oral every 12 hours  levoFLOXacin  Tablet 500 milliGRAM(s) Oral every 24 hours  melatonin 3 milliGRAM(s) Oral at bedtime  meropenem  IVPB 1000 milliGRAM(s) IV Intermittent every 8 hours  midodrine. 20 milliGRAM(s) Oral three times a day  norepinephrine Infusion 0.05 MICROgram(s)/kG/Min IV Continuous <Continuous>  ondansetron Injectable 4 milliGRAM(s) IV Push every 8 hours PRN  pantoprazole  Injectable 40 milliGRAM(s) IV Push every 24 hours  polyethylene glycol 3350 17 Gram(s) Oral at bedtime  raloxifene 60 milliGRAM(s) Oral daily  saccharomyces boulardii 250 milliGRAM(s) Oral two times a day  senna 2 Tablet(s) Oral at bedtime  silver sulfADIAZINE 1% Cream 1 Application(s) Topical two times a day  sodium chloride 0.65% Nasal 2 Spray(s) Both Nostrils three times a day  sodium chloride 3%  Inhalation 4 milliLiter(s) Inhalation every 4 hours  vancomycin  IVPB 750 milliGRAM(s) IV Intermittent every 12 hours      VITALS:   T(F): 98.9  HR: 68  BP: 102/52  RR: 20  SpO2: 100%    LABS:                        7.6    13.44 )-----------( 584      ( 20 Feb 2024 05:32 )             25.9     02-20    140  |  99  |  12  ----------------------------<  222<H>  3.8   |  30  |  <0.5<L>    Ca    8.1<L>      20 Feb 2024 05:32  Phos  2.3     02-20  Mg     1.9     02-20    TPro  5.8<L>  /  Alb  2.4<L>  /  TBili  <0.2  /  DBili  x   /  AST  13  /  ALT  11  /  AlkPhos  100  02-20      Urinalysis Basic - ( 20 Feb 2024 05:32 )    Color: x / Appearance: x / SG: x / pH: x  Gluc: 222 mg/dL / Ketone: x  / Bili: x / Urobili: x   Blood: x / Protein: x / Nitrite: x   Leuk Esterase: x / RBC: x / WBC x   Sq Epi: x / Non Sq Epi: x / Bacteria: x            Culture - Blood (collected 17 Feb 2024 20:41)  Source: .Blood None  Preliminary Report (20 Feb 2024 06:01):    No growth at 48 Hours              PHYSICAL EXAM:  GEN: No acute distress  LUNGS: Clear to auscultation bilaterally   HEART: S1/S2 present.    ABD: Soft, non-tender, non-distended.   EXT: No pedal edema  NEURO: AAOX3

## 2024-02-20 NOTE — PROGRESS NOTE ADULT - SUBJECTIVE AND OBJECTIVE BOX
Patient is a 58y old  Female who presents with a chief complaint of Respiratory Distress (19 Feb 2024 13:57)        Over Night Events: Pt remains on HFNC, febrile to 100.4, on levo 0.1        ROS:     All ROS are negative except HPI       PHYSICAL EXAM    ICU Vital Signs Last 24 Hrs  T(C): 37.1 (20 Feb 2024 07:56), Max: 38 (19 Feb 2024 16:06)  T(F): 98.7 (20 Feb 2024 07:56), Max: 100.4 (19 Feb 2024 16:06)  HR: 73 (20 Feb 2024 10:00) (67 - 103)  BP: 91/54 (20 Feb 2024 10:00) (91/54 - 128/71)  BP(mean): 66 (20 Feb 2024 10:00) (66 - 79)  RR: 19 (20 Feb 2024 10:00) (19 - 20)  SpO2: 100% (20 Feb 2024 10:00) (90% - 100%)    O2 Parameters below as of 20 Feb 2024 10:00  Patient On (Oxygen Delivery Method): nasal cannula, high flow  O2 Flow (L/min): 60  O2 Concentration (%): 90      CONSTITUTIONAL:  NAD    CARDIAC:   Normal rate,   Regular rhythm.    No edema    RESPIRATORY:   No wheezing  Bilateral BS  Normal chest expansion    GASTROINTESTINAL:  Abdomen soft,   Non-tender,   No guarding,     MUSCULOSKELETAL:   Range of motion is not limited,  No clubbing, cyanosis    NEUROLOGICAL:   awake, responds to pain     SKIN:   stage 2 sacral ulcer    02-19-24 @ 07:01  -  02-20-24 @ 07:00  --------------------------------------------------------  IN:    Enteral Tube Flush: 500 mL    Enteral Tube Flush: 525 mL    Jevity 1.2: 1200 mL    Lactated Ringers Bolus: 500 mL    Norepinephrine: 191.1 mL    sodium chloride 0.9%: 150 mL  Total IN: 3066.1 mL    OUT:    Voided (mL): 800 mL  Total OUT: 800 mL    Total NET: 2266.1 mL      02-20-24 @ 07:01  -  02-20-24 @ 10:47  --------------------------------------------------------  IN:    IV PiggyBack: 260 mL    Norepinephrine: 39.2 mL  Total IN: 299.2 mL    OUT:    Voided (mL): 350 mL  Total OUT: 350 mL    Total NET: -50.8 mL        LABS:                            7.6    13.44 )-----------( 584      ( 20 Feb 2024 05:32 )             25.9                                               02-20    140  |  99  |  12  ----------------------------<  222<H>  3.8   |  30  |  <0.5<L>    Ca    8.1<L>      20 Feb 2024 05:32  Phos  2.3     02-20  Mg     1.9     02-20    TPro  5.8<L>  /  Alb  2.4<L>  /  TBili  <0.2  /  DBili  x   /  AST  13  /  ALT  11  /  AlkPhos  100  02-20                                             Urinalysis Basic - ( 20 Feb 2024 05:32 )    Color: x / Appearance: x / SG: x / pH: x  Gluc: 222 mg/dL / Ketone: x  / Bili: x / Urobili: x   Blood: x / Protein: x / Nitrite: x   Leuk Esterase: x / RBC: x / WBC x   Sq Epi: x / Non Sq Epi: x / Bacteria: x                                                  LIVER FUNCTIONS - ( 20 Feb 2024 05:32 )  Alb: 2.4 g/dL / Pro: 5.8 g/dL / ALK PHOS: 100 U/L / ALT: 11 U/L / AST: 13 U/L / GGT: x                                                  Culture - Blood (collected 17 Feb 2024 20:41)  Source: .Blood None  Preliminary Report (20 Feb 2024 06:01):    No growth at 48 Hours                                                                                          MEDICATIONS  (STANDING):  acetylcysteine 20%  Inhalation 4 milliLiter(s) Inhalation every 4 hours  albuterol/ipratropium for Nebulization 3 milliLiter(s) Nebulizer every 4 hours  artificial  tears Solution 1 Drop(s) Both EYES two times a day  calcium carbonate   1250 mG (OsCal) 1 Tablet(s) Oral two times a day  caspofungin IVPB      caspofungin IVPB 50 milliGRAM(s) IV Intermittent every 24 hours  chlorhexidine 2% Cloths 1 Application(s) Topical daily  enoxaparin Injectable 50 milliGRAM(s) SubCutaneous every 12 hours  gabapentin 300 milliGRAM(s) Oral every 12 hours  levETIRAcetam  Solution 500 milliGRAM(s) Oral every 12 hours  levoFLOXacin  Tablet 500 milliGRAM(s) Oral every 24 hours  melatonin 3 milliGRAM(s) Oral at bedtime  meropenem  IVPB 1000 milliGRAM(s) IV Intermittent every 8 hours  midodrine. 20 milliGRAM(s) Oral three times a day  norepinephrine Infusion 0.05 MICROgram(s)/kG/Min (4.9 mL/Hr) IV Continuous <Continuous>  pantoprazole  Injectable 40 milliGRAM(s) IV Push every 24 hours  polyethylene glycol 3350 17 Gram(s) Oral at bedtime  raloxifene 60 milliGRAM(s) Oral daily  saccharomyces boulardii 250 milliGRAM(s) Oral two times a day  senna 2 Tablet(s) Oral at bedtime  silver sulfADIAZINE 1% Cream 1 Application(s) Topical two times a day  sodium chloride 0.65% Nasal 2 Spray(s) Both Nostrils three times a day  sodium chloride 3%  Inhalation 4 milliLiter(s) Inhalation every 4 hours  vancomycin  IVPB 750 milliGRAM(s) IV Intermittent every 12 hours    MEDICATIONS  (PRN):  acetaminophen     Tablet .. 650 milliGRAM(s) Oral every 6 hours PRN Temp greater or equal to 38C (100.4F), Mild Pain (1 - 3)  aluminum hydroxide/magnesium hydroxide/simethicone Suspension 30 milliLiter(s) Oral every 4 hours PRN Dyspepsia  ondansetron Injectable 4 milliGRAM(s) IV Push every 8 hours PRN Nausea and/or Vomiting      New X-rays reviewed:                                                                                  ECHO    CXR interpreted by me:

## 2024-02-20 NOTE — PROGRESS NOTE ADULT - SUBJECTIVE AND OBJECTIVE BOX
58F w/ PMHx Down Syndrome, nonverbal at baseline, Hypothyroidism, Cerebral palsy and Seizure Disorders presents to the ED from nursing facility/group home ([facility]) presented to ED for respiratory distress and high grade fever. Patient found to be septic on admission.Admitted to SDU for management of acute respiratory distress 2/2 CAP/Aspiration PNA (20 Jan 2024 18:22)  While pt was on the floor she developed dyspnea after swallow evaluation, was spiking high grade fever, consulted by pulmonary/critical care and was upgraded back to SDU.  Pt developed left lung white out, improved after aggressive chest PT, treated with Meropenem and caspofungin, ID is following, her overall prognosis is very poor, she might need trach and PEG in the nearest future , a meeting with facility staff and state took place on 2/14.   Pt completed course o Meropenem, Levofloxacin added on 2/15 ( pt spiked fever), Meropenem resumed on 2/18,  she was tapered off pressure support, still on high flow oxygen. While in SDU  Hb dropped without acute blood loss, will send anemia work up and monitor closely ( pt is o full AC).   In last 48 hours pt is spiking high grade fever, f/u repeat sepsis work up, IV hydration, start pressure support, keep MAP above 60.     Today pt is lethargic, nonverbal, pale, aid at the bedside.       PAST MEDICAL & SURGICAL HISTORY:  Down syndrome  Osteoporosis  Mild anemia  Neuropathy  S/P debridement of R hip on 3/2/21    Allergies  No Known Allergies    Vital Signs Last 24 Hrs  T(C): 37.2 (20 Feb 2024 11:34), Max: 37.3 (19 Feb 2024 20:00)  T(F): 98.9 (20 Feb 2024 11:34), Max: 99.1 (19 Feb 2024 20:00)  HR: 60 (20 Feb 2024 13:45) (58 - 90)  BP: 111/60 (20 Feb 2024 13:45) (91/54 - 126/62)  BP(mean): 71 (20 Feb 2024 13:45) (66 - 84)  RR: 20 (20 Feb 2024 13:45) (18 - 20)  SpO2: 100% (20 Feb 2024 13:45) (90% - 100%)    Parameters below as of 20 Feb 2024 13:45  Patient On (Oxygen Delivery Method): nasal cannula, high flow  O2 Flow (L/min): 60  O2 Concentration (%): 90    PHYSICAL EXAM:   GENERAL: NAD, nonverbal  HEENT: atraumatic, EOMI.  Lungs: coarse BS, no BS at left base   CVS: SIS2 +,  no murmur, tachycardic   Abdomen/ GI:  Soft,  NT, ND, BS+  CNS: awake , non verbal, anxious   Ext: contracted  Skin: no rash, no ulcers.    LABs:                           7.6    13.44 )-----------( 584      ( 20 Feb 2024 05:32 )             25.9   02-20    140  |  99  |  12  ----------------------------<  222<H>  3.8   |  30  |  <0.5<L>    Ca    8.1<L>      20 Feb 2024 05:32  Phos  2.3     02-20  Mg     1.9     02-20    TPro  5.8<L>  /  Alb  2.4<L>  /  TBili  <0.2  /  DBili  x   /  AST  13  /  ALT  11  /  AlkPhos  100  02-20      Parameters below as of 04 Feb 2024 08:47  Patient On (Oxygen Delivery Method): nasal cannula, high flow  O2 Flow (L/min): 60  O2 Concentration (%): 100  Urinalysis Basic - ( 01 Feb 2024 11:40 )    Color: Yellow / Appearance: Turbid / SG: >1.030 / pH: x  Gluc: x / Ketone: Negative mg/dL  / Bili: Negative / Urobili: 1.0 mg/dL   Blood: x / Protein: 100 mg/dL / Nitrite: Negative   Leuk Esterase: Negative / RBC: 10 /HPF / WBC 5 /HPF   Sq Epi: x / Non Sq Epi: 2 /HPF / Bacteria: Negative /HPF    Culture - Blood (01.31.24 @ 18:21)   Specimen Source: .Blood None  Culture Results:   No growth at 24 hoursCulture - Urine (01.23.24 @ 05:20)   Specimen Source: Clean Catch Clean Catch (Midstream)  Culture Results:   No growthCulture - Blood (01.20.24 @ 16:15)   - Coagulase negative Staphylococcus: Detec  Gram Stain:   Growth in aerobic bottle: Gram positive cocci in pairs  Specimen Source: .Blood Blood-Peripheral  Organism: Blood Culture PCR  Culture Results:   Growth in aerobic bottle: Staphylococcus hominis   Isolation of Coagulase negative Staphylococcus from single blood culture     sets may represent   contamination. Contact the Microbiology Department at [phone] if   susceptibility testing is   clinically indicated.   Direct identification is available within approximately 3-5   hours either by Blood Panel Multiplexed PCR or Direct   MALDI-TOF. Details: [email or web address]  Organism Identification: Blood Culture PCR  Method Type: PCR    Culture - Blood (02.12.24 @ 11:17)   Specimen Source: .Blood None  Culture Results:   No growth at 24 hours    Culture - Blood (02.17.24 @ 20:41)   Specimen Source: .Blood None  Culture Results:   No growth at 24 hours  RADIOLOGY:   < from: Xray Chest 1 View- PORTABLE-Routine (Xray Chest 1 View- PORTABLE-Routine in AM.) (02.19.24 @ 05:58) >  Findings/  impression:        Support devices: NG tube satisfactory position.  EKG leads overlie thorax, obscuring anatomy.    Cardiac/ Mediastinum: Stable coronary    Lungs/ Pleura: Interval decrease in diffuse left lung opacity.  Stable right lung opacity. No pneumothorax    Skeletal/ soft tissues: Stable    < end of copied text >    < from: Xray Chest 1 View- PORTABLE-Routine (Xray Chest 1 View- PORTABLE-Routine in AM.) (02.17.24 @ 07:21) >  FINDINGS/  IMPRESSION:    Unchanged NG tube.    Increased right basal opacities. Left midlung opacities, increased. Left   basal consolidation, decreased. No pneumothorax. Stable cardiomediastinal   silhouette.    Unchanged osseous structures.    < end of copied text >      < from: Xray Chest 1 View-PORTABLE IMMEDIATE (Xray Chest 1 View-PORTABLE IMMEDIATE .) (02.14.24 @ 08:11) >  IMPRESSION:    1. Enteric tube seen to the level of the stomach.  2. Bilateral opacities, worsened on the left    < end of copied text >  < from: VA Duplex Lower Ext Vein Scan, Bilat (02.11.24 @ 13:47) >    IMPRESSION:  No evidence of deep venous thrombosis in either lower extremity.    < end of copied text >      < from: VA Duplex Lower Ext Vein Scan, Bilat (01.22.24 @ 14:52) >  IMPRESSION:  No evidence of deep venous thrombosis in either lower extremity.    < end of copied text >  < from: TTE Echo Complete w/ Contrast w/o Doppler (01.22.24 @ 13:35) >  Summary:   1. Technically difficult and limited study.   2. Endocardial visualization was enhanced with intravenous echo contrast.   3. Left ventricular ejection fraction, by visual estimation, is >55%.   4. Normal global left ventricular systolic function.   5. The left ventricular diastolic function could not be assessed in this   study.  < from: Xray Chest 1 View- PORTABLE-Routine (Xray Chest 1 View- PORTABLE-Routine in AM.) (02.03.24 @ 06:48) >    Impression:    Stable bilateral opacities    < from: Xray Chest 1 View- PORTABLE-Routine (Xray Chest 1 View- PORTABLE-Routine in AM.) (02.17.24 @ 07:21) >  FINDINGS/  IMPRESSION:    Unchanged NG tube.    Increased right basal opacities. Left midlung opacities, increased. Left   basal consolidation, decreased. No pneumothorax. Stable cardiomediastinal   silhouette.    Unchanged osseous structures.    < end of copied text >      MEDICATIONS  (STANDING):  acetylcysteine 20%  Inhalation 4 milliLiter(s) Inhalation every 4 hours  albuterol/ipratropium for Nebulization 3 milliLiter(s) Nebulizer every 4 hours  artificial  tears Solution 1 Drop(s) Both EYES two times a day  calcium carbonate   1250 mG (OsCal) 1 Tablet(s) Oral two times a day  caspofungin IVPB      caspofungin IVPB 50 milliGRAM(s) IV Intermittent every 24 hours  chlorhexidine 2% Cloths 1 Application(s) Topical daily  enoxaparin Injectable 50 milliGRAM(s) SubCutaneous every 12 hours  gabapentin 300 milliGRAM(s) Oral every 12 hours  levETIRAcetam  Solution 500 milliGRAM(s) Oral every 12 hours  levoFLOXacin  Tablet 500 milliGRAM(s) Oral every 24 hours  melatonin 3 milliGRAM(s) Oral at bedtime  meropenem  IVPB 1000 milliGRAM(s) IV Intermittent every 8 hours  midodrine. 20 milliGRAM(s) Oral three times a day  norepinephrine Infusion 0.05 MICROgram(s)/kG/Min (4.9 mL/Hr) IV Continuous <Continuous>  pantoprazole  Injectable 40 milliGRAM(s) IV Push every 24 hours  polyethylene glycol 3350 17 Gram(s) Oral at bedtime  raloxifene 60 milliGRAM(s) Oral daily  saccharomyces boulardii 250 milliGRAM(s) Oral two times a day  senna 2 Tablet(s) Oral at bedtime  sodium chloride 0.65% Nasal 2 Spray(s) Both Nostrils three times a day  sodium chloride 3%  Inhalation 4 milliLiter(s) Inhalation every 4 hours  vancomycin  IVPB 750 milliGRAM(s) IV Intermittent every 12 hours    MEDICATIONS  (PRN):  acetaminophen     Tablet .. 650 milliGRAM(s) Oral every 6 hours PRN Temp greater or equal to 38C (100.4F), Mild Pain (1 - 3)  aluminum hydroxide/magnesium hydroxide/simethicone Suspension 30 milliLiter(s) Oral every 4 hours PRN Dyspepsia  ondansetron Injectable 4 milliGRAM(s) IV Push every 8 hours PRN Nausea and/or Vomiting

## 2024-02-21 LAB
1,3 BETA GLUCAN SER QL: ABNORMAL
ALBUMIN SERPL ELPH-MCNC: 2.3 G/DL — LOW (ref 3.5–5.2)
ALP SERPL-CCNC: 94 U/L — SIGNIFICANT CHANGE UP (ref 30–115)
ALT FLD-CCNC: 16 U/L — SIGNIFICANT CHANGE UP (ref 0–41)
ANION GAP SERPL CALC-SCNC: 11 MMOL/L — SIGNIFICANT CHANGE UP (ref 7–14)
AST SERPL-CCNC: 22 U/L — SIGNIFICANT CHANGE UP (ref 0–41)
BASOPHILS # BLD AUTO: 0.07 K/UL — SIGNIFICANT CHANGE UP (ref 0–0.2)
BASOPHILS NFR BLD AUTO: 0.8 % — SIGNIFICANT CHANGE UP (ref 0–1)
BILIRUB SERPL-MCNC: <0.2 MG/DL — SIGNIFICANT CHANGE UP (ref 0.2–1.2)
BUN SERPL-MCNC: 10 MG/DL — SIGNIFICANT CHANGE UP (ref 10–20)
CALCIUM SERPL-MCNC: 8.1 MG/DL — LOW (ref 8.4–10.4)
CHLORIDE SERPL-SCNC: 97 MMOL/L — LOW (ref 98–110)
CO2 SERPL-SCNC: 31 MMOL/L — SIGNIFICANT CHANGE UP (ref 17–32)
CREAT SERPL-MCNC: <0.5 MG/DL — LOW (ref 0.7–1.5)
EGFR: 115 ML/MIN/1.73M2 — SIGNIFICANT CHANGE UP
EOSINOPHIL # BLD AUTO: 0.47 K/UL — SIGNIFICANT CHANGE UP (ref 0–0.7)
EOSINOPHIL NFR BLD AUTO: 5.3 % — SIGNIFICANT CHANGE UP (ref 0–8)
FUNGITELL: 76 PG/ML
GLUCOSE BLDC GLUCOMTR-MCNC: 116 MG/DL — HIGH (ref 70–99)
GLUCOSE BLDC GLUCOMTR-MCNC: 157 MG/DL — HIGH (ref 70–99)
GLUCOSE BLDC GLUCOMTR-MCNC: 182 MG/DL — HIGH (ref 70–99)
GLUCOSE SERPL-MCNC: 175 MG/DL — HIGH (ref 70–99)
HCT VFR BLD CALC: 26.6 % — LOW (ref 37–47)
HGB BLD-MCNC: 8 G/DL — LOW (ref 12–16)
IMM GRANULOCYTES NFR BLD AUTO: 0.3 % — SIGNIFICANT CHANGE UP (ref 0.1–0.3)
LYMPHOCYTES # BLD AUTO: 1.18 K/UL — LOW (ref 1.2–3.4)
LYMPHOCYTES # BLD AUTO: 13.3 % — LOW (ref 20.5–51.1)
MAGNESIUM SERPL-MCNC: 2.1 MG/DL — SIGNIFICANT CHANGE UP (ref 1.8–2.4)
MCHC RBC-ENTMCNC: 23.6 PG — LOW (ref 27–31)
MCHC RBC-ENTMCNC: 30.1 G/DL — LOW (ref 32–37)
MCV RBC AUTO: 78.5 FL — LOW (ref 81–99)
MONOCYTES # BLD AUTO: 0.59 K/UL — SIGNIFICANT CHANGE UP (ref 0.1–0.6)
MONOCYTES NFR BLD AUTO: 6.7 % — SIGNIFICANT CHANGE UP (ref 1.7–9.3)
NEUTROPHILS # BLD AUTO: 6.5 K/UL — SIGNIFICANT CHANGE UP (ref 1.4–6.5)
NEUTROPHILS NFR BLD AUTO: 73.6 % — SIGNIFICANT CHANGE UP (ref 42.2–75.2)
NRBC # BLD: 0 /100 WBCS — SIGNIFICANT CHANGE UP (ref 0–0)
PHOSPHATE SERPL-MCNC: 2.8 MG/DL — SIGNIFICANT CHANGE UP (ref 2.1–4.9)
PLATELET # BLD AUTO: 512 K/UL — HIGH (ref 130–400)
PMV BLD: 10.4 FL — SIGNIFICANT CHANGE UP (ref 7.4–10.4)
POTASSIUM SERPL-MCNC: 4.4 MMOL/L — SIGNIFICANT CHANGE UP (ref 3.5–5)
POTASSIUM SERPL-SCNC: 4.4 MMOL/L — SIGNIFICANT CHANGE UP (ref 3.5–5)
PROT SERPL-MCNC: 5.7 G/DL — LOW (ref 6–8)
RBC # BLD: 3.39 M/UL — LOW (ref 4.2–5.4)
RBC # FLD: 21.2 % — HIGH (ref 11.5–14.5)
SODIUM SERPL-SCNC: 139 MMOL/L — SIGNIFICANT CHANGE UP (ref 135–146)
WBC # BLD: 8.84 K/UL — SIGNIFICANT CHANGE UP (ref 4.8–10.8)
WBC # FLD AUTO: 8.84 K/UL — SIGNIFICANT CHANGE UP (ref 4.8–10.8)

## 2024-02-21 PROCEDURE — 71260 CT THORAX DX C+: CPT | Mod: 26

## 2024-02-21 PROCEDURE — 71045 X-RAY EXAM CHEST 1 VIEW: CPT | Mod: 26,76

## 2024-02-21 PROCEDURE — 99291 CRITICAL CARE FIRST HOUR: CPT

## 2024-02-21 PROCEDURE — 71045 X-RAY EXAM CHEST 1 VIEW: CPT | Mod: 26,77

## 2024-02-21 PROCEDURE — 99232 SBSQ HOSP IP/OBS MODERATE 35: CPT

## 2024-02-21 PROCEDURE — 74177 CT ABD & PELVIS W/CONTRAST: CPT | Mod: 26

## 2024-02-21 RX ORDER — PETROLATUM,WHITE
1 JELLY (GRAM) TOPICAL
Refills: 0 | Status: DISCONTINUED | OUTPATIENT
Start: 2024-02-21 | End: 2024-04-08

## 2024-02-21 RX ORDER — LEVETIRACETAM 250 MG/1
500 TABLET, FILM COATED ORAL EVERY 12 HOURS
Refills: 0 | Status: DISCONTINUED | OUTPATIENT
Start: 2024-02-21 | End: 2024-02-21

## 2024-02-21 RX ORDER — LEVETIRACETAM 250 MG/1
500 TABLET, FILM COATED ORAL
Refills: 0 | Status: DISCONTINUED | OUTPATIENT
Start: 2024-02-21 | End: 2024-03-26

## 2024-02-21 RX ADMIN — Medication 1 DROP(S): at 05:14

## 2024-02-21 RX ADMIN — Medication 3 MILLILITER(S): at 13:37

## 2024-02-21 RX ADMIN — Medication 2 SPRAY(S): at 13:15

## 2024-02-21 RX ADMIN — PANTOPRAZOLE SODIUM 40 MILLIGRAM(S): 20 TABLET, DELAYED RELEASE ORAL at 05:12

## 2024-02-21 RX ADMIN — GABAPENTIN 300 MILLIGRAM(S): 400 CAPSULE ORAL at 05:13

## 2024-02-21 RX ADMIN — MEROPENEM 100 MILLIGRAM(S): 1 INJECTION INTRAVENOUS at 13:11

## 2024-02-21 RX ADMIN — Medication 1 DROP(S): at 16:45

## 2024-02-21 RX ADMIN — LEVETIRACETAM 500 MILLIGRAM(S): 250 TABLET, FILM COATED ORAL at 05:13

## 2024-02-21 RX ADMIN — MIDODRINE HYDROCHLORIDE 20 MILLIGRAM(S): 2.5 TABLET ORAL at 05:13

## 2024-02-21 RX ADMIN — MEROPENEM 100 MILLIGRAM(S): 1 INJECTION INTRAVENOUS at 05:14

## 2024-02-21 RX ADMIN — SODIUM CHLORIDE 4 MILLILITER(S): 9 INJECTION INTRAMUSCULAR; INTRAVENOUS; SUBCUTANEOUS at 20:00

## 2024-02-21 RX ADMIN — MEROPENEM 100 MILLIGRAM(S): 1 INJECTION INTRAVENOUS at 23:55

## 2024-02-21 RX ADMIN — Medication 250 MILLIGRAM(S): at 05:13

## 2024-02-21 RX ADMIN — ENOXAPARIN SODIUM 50 MILLIGRAM(S): 100 INJECTION SUBCUTANEOUS at 16:40

## 2024-02-21 RX ADMIN — Medication 4 MILLILITER(S): at 20:00

## 2024-02-21 RX ADMIN — Medication 2 SPRAY(S): at 05:19

## 2024-02-21 RX ADMIN — Medication 1 TABLET(S): at 05:13

## 2024-02-21 RX ADMIN — LEVETIRACETAM 400 MILLIGRAM(S): 250 TABLET, FILM COATED ORAL at 23:55

## 2024-02-21 RX ADMIN — Medication 3 MILLILITER(S): at 20:39

## 2024-02-21 RX ADMIN — CASPOFUNGIN ACETATE 260 MILLIGRAM(S): 7 INJECTION, POWDER, LYOPHILIZED, FOR SOLUTION INTRAVENOUS at 13:10

## 2024-02-21 RX ADMIN — CHLORHEXIDINE GLUCONATE 1 APPLICATION(S): 213 SOLUTION TOPICAL at 12:53

## 2024-02-21 RX ADMIN — Medication 1 APPLICATION(S): at 16:45

## 2024-02-21 RX ADMIN — MIDODRINE HYDROCHLORIDE 20 MILLIGRAM(S): 2.5 TABLET ORAL at 16:40

## 2024-02-21 RX ADMIN — ENOXAPARIN SODIUM 50 MILLIGRAM(S): 100 INJECTION SUBCUTANEOUS at 05:12

## 2024-02-21 NOTE — PHARMACOTHERAPY INTERVENTION NOTE - COMMENTS
MRSA neg- vancomycin d/c
recommended changing levetiracetam IV to liquid via ng
As per policy, discontinued vancomycin trough order since patient is no longer on vancomycin.      Alicia Abdi, PharmD   Clinical Pharmacy Specialist, Infectious Diseases  Tele-Antimicrobial Stewardship Program (Tele-ASP)  Tele-ASP Phone: [phone]  
Recommended modifying levofloxacin order so last dose is given today per Dr. Gambino's recommendation. 
Recommended to discontinue empiric vancomycin for PNA since MRSA nares came back negative as per ID. Blood culture remain negative and patient is still on meropenem and levofloxacin.       Alicia Abdi, PharmD   Clinical Pharmacy Specialist, Infectious Diseases  Tele-Antimicrobial Stewardship Program (Tele-ASP)  Tele-ASP Phone: [phone]

## 2024-02-21 NOTE — PROGRESS NOTE ADULT - ASSESSMENT
IMPRESSION:    Acute hypoxemic respiratory failure  Likely aspiration pneumonia   Sepsis POA  Septic shock off Levophed   Recurrent aspiration pneumonia / prior intubation  HO DVT on Eliquis   HO GI bleed  HO OM  HO recent duodenal perforation   HO polymicrobial bacteremia   H/o CP, DS  H/o seizures    PLAN:    CNS:  Avoid CNS Depressant, AED. MS at baseline.     HEENT: Oral care.     PULMONARY: HOB at 45 degrees.  Aspiration precaution. Wean FiO2 Keep spo2 more than 92%.  CXR noted. Aggressive pulm toilet, frequent suctioning.     CARDIOVASCULAR: Keep MAP more than 60. Avoid overload. C/w midodrine 20mg.  Goal directed fluid resuscitation.    GI: Protonix. NG  feeding.  Might need PEG when more stable    INFECTIOUS DISEASE:  ABX and Anti fungal per ID.      HEMATOLOGICAL:  Lovenox therapeutic.  Monitor CBC    ENDOCRINE:  Follow up FS.  Insulin protocol if needed.    MUSCULOSKELETAL: Bedrest.  Off loading.  Wound care.    Prognosis very poor.    Palliative care following    SDU.

## 2024-02-21 NOTE — PROGRESS NOTE ADULT - SUBJECTIVE AND OBJECTIVE BOX
58F w/ PMHx Down Syndrome, nonverbal at baseline, Hypothyroidism, Cerebral palsy and Seizure Disorders presents to the ED from nursing facility/group home ([facility]) presented to ED for respiratory distress and high grade fever. Patient found to be septic on admission.Admitted to SDU for management of acute respiratory distress 2/2 CAP/Aspiration PNA   While pt was on the floor she developed dyspnea after swallow evaluation, was spiking high grade fever, consulted by pulmonary/critical care and was upgraded back to SDU.  Pt developed left lung white out, improved after aggressive chest PT, treated with Meropenem and caspofungin, ID is following, her overall prognosis is very poor, she might need trach and PEG in the nearest future , a meeting with facility staff and state took place on 2/14.   Pt completed course o Meropenem, Levofloxacin added on 2/15 ( pt spiked fever), Meropenem resumed on 2/18,  she was tapered off pressure support, still on high flow oxygen. While in SDU  Hb dropped without acute blood loss, will send anemia work up and monitor closely ( pt is o full AC).   In last 24 hours pt is spiking high grade fever, will repeat sepsis work up, check RVP, start IV fluids, call ID for a follow up.   On Tuesday will contract advisory board and discuss level of care.   Today pt is awake, anxious, febrile, tachycardic.       Currently admitted to medicine with the primary diagnosis of Sepsis with acute hypoxic respiratory failure    Today is hospital day 32d.

## 2024-02-21 NOTE — PROGRESS NOTE ADULT - SUBJECTIVE AND OBJECTIVE BOX
Patient is a 58y old  Female who presents with a chief complaint of Respiratory Distress (20 Feb 2024 16:34)        Over Night Events:  remains critically ill on HFNCO2.  On Levophed.          ROS:     All ROS are negative except HPI         PHYSICAL EXAM    ICU Vital Signs Last 24 Hrs  T(C): 36.8 (21 Feb 2024 08:00), Max: 37.5 (20 Feb 2024 20:05)  T(F): 98.3 (21 Feb 2024 08:00), Max: 99.5 (20 Feb 2024 20:05)  HR: 77 (21 Feb 2024 08:00) (55 - 93)  BP: 129/58 (21 Feb 2024 08:00) (79/59 - 129/63)  BP(mean): 84 (21 Feb 2024 08:00) (62 - 89)  ABP: --  ABP(mean): --  RR: 20 (21 Feb 2024 08:00) (18 - 22)  SpO2: 95% (21 Feb 2024 08:00) (95% - 100%)    O2 Parameters below as of 21 Feb 2024 08:00  Patient On (Oxygen Delivery Method): nasal cannula, high flow            CONSTITUTIONAL:  Ill appearing in NAD    ENT:   Airway patent,   Mouth with normal mucosa.       EYES:   Pupils equal,   Round and reactive to light.    CARDIAC:   Normal rate,   Regular rhythm.      RESPIRATORY:   No wheezing  Bilateral crackles   Normal chest expansion  Not tachypneic,  No use of accessory muscles    GASTROINTESTINAL:  Abdomen soft,   Non-tender,   No guarding,   + BS    MUSCULOSKELETAL:   No clubbing, cyanosis    NEUROLOGICAL:   Alert       SKIN:   Skin normal color for race,   No evidence of rash.        02-20-24 @ 07:01  -  02-21-24 @ 07:00  --------------------------------------------------------  IN:    Enteral Tube Flush: 525 mL    IV PiggyBack: 460 mL    Jevity 1.2: 1200 mL    Lactated Ringers Bolus: 250 mL    Modular Fluid: 1 mL    Norepinephrine: 193.3 mL  Total IN: 2629.3 mL    OUT:    Voided (mL): 650 mL  Total OUT: 650 mL    Total NET: 1979.3 mL          LABS:                            8.0    8.84  )-----------( 512      ( 21 Feb 2024 04:26 )             26.6                                               02-21    139  |  97<L>  |  10  ----------------------------<  175<H>  4.4   |  31  |  <0.5<L>    Ca    8.1<L>      21 Feb 2024 04:26  Phos  2.8     02-21  Mg     2.1     02-21    TPro  5.7<L>  /  Alb  2.3<L>  /  TBili  <0.2  /  DBili  x   /  AST  22  /  ALT  16  /  AlkPhos  94  02-21                                             Urinalysis Basic - ( 21 Feb 2024 04:26 )    Color: x / Appearance: x / SG: x / pH: x  Gluc: 175 mg/dL / Ketone: x  / Bili: x / Urobili: x   Blood: x / Protein: x / Nitrite: x   Leuk Esterase: x / RBC: x / WBC x   Sq Epi: x / Non Sq Epi: x / Bacteria: x                                                  LIVER FUNCTIONS - ( 21 Feb 2024 04:26 )  Alb: 2.3 g/dL / Pro: 5.7 g/dL / ALK PHOS: 94 U/L / ALT: 16 U/L / AST: 22 U/L / GGT: x                                                  Culture - Blood (collected 19 Feb 2024 11:23)  Source: .Blood None  Preliminary Report (20 Feb 2024 20:02):    No growth at 24 hours                                                                                           MEDICATIONS  (STANDING):  acetylcysteine 20%  Inhalation 4 milliLiter(s) Inhalation every 4 hours  albuterol/ipratropium for Nebulization 3 milliLiter(s) Nebulizer every 4 hours  artificial  tears Solution 1 Drop(s) Both EYES two times a day  calcium carbonate   1250 mG (OsCal) 1 Tablet(s) Oral two times a day  caspofungin IVPB 50 milliGRAM(s) IV Intermittent every 24 hours  caspofungin IVPB      chlorhexidine 2% Cloths 1 Application(s) Topical daily  enoxaparin Injectable 50 milliGRAM(s) SubCutaneous every 12 hours  gabapentin 300 milliGRAM(s) Oral every 12 hours  levETIRAcetam  Solution 500 milliGRAM(s) Oral every 12 hours  levoFLOXacin  Tablet 500 milliGRAM(s) Oral every 24 hours  melatonin 3 milliGRAM(s) Oral at bedtime  meropenem  IVPB 1000 milliGRAM(s) IV Intermittent every 8 hours  midodrine. 20 milliGRAM(s) Oral three times a day  norepinephrine Infusion 0.05 MICROgram(s)/kG/Min (4.9 mL/Hr) IV Continuous <Continuous>  pantoprazole  Injectable 40 milliGRAM(s) IV Push every 24 hours  polyethylene glycol 3350 17 Gram(s) Oral at bedtime  raloxifene 60 milliGRAM(s) Oral daily  saccharomyces boulardii 250 milliGRAM(s) Oral two times a day  senna 2 Tablet(s) Oral at bedtime  sodium chloride 0.65% Nasal 2 Spray(s) Both Nostrils three times a day  sodium chloride 3%  Inhalation 4 milliLiter(s) Inhalation every 4 hours    MEDICATIONS  (PRN):  acetaminophen     Tablet .. 650 milliGRAM(s) Oral every 6 hours PRN Temp greater or equal to 38C (100.4F), Mild Pain (1 - 3)  aluminum hydroxide/magnesium hydroxide/simethicone Suspension 30 milliLiter(s) Oral every 4 hours PRN Dyspepsia  ondansetron Injectable 4 milliGRAM(s) IV Push every 8 hours PRN Nausea and/or Vomiting      New X-rays reviewed:                                                                                  ECHO

## 2024-02-21 NOTE — PROGRESS NOTE ADULT - ASSESSMENT
58F w/ PMHx Down Syndrome, nonverbal at baseline, Hypothyroidism, Cerebral palsy and Seizure Disorders presents [facility] the ED [facility] presented to ED for respiratory distress and high grade fever. Patient found to be septic on admission. Admitted to SDU for management of acute respiratory distress 2/2 CAP/Aspiration PNA (20 Jan 2024 18:22)  While pt was on the floor she developed dyspnea after swallow evaluation, was spiking high grade fever, consulted by pulmonary/critical care and was upgraded back to SDU.    A/P  # Sepsis POA / Acute hypoxic resp failure / RSV bronchiolitis /  H/o Dysphagia/ suspected aspiration PNA /high grade fever   - CXR noted 2/20, stable left large opacities and small right opacity    - completed the course of  meropenem 15 days on 2/16, Meropenem was resumed 2/18 ( as per prior ID recommendations), pt is spiking high grade fever and wbc uptrending.   - Histo Ag negative, only positive blood cx is for coag -ve staph  - currently on HFNC 60L FiO2 80%  - hypotensive again 2/19 despite fluid boluses so started levophed on 2/19 - c/w   Midodrine  20 mg Q 8 hours   - MRSA negative   - BCx (1/20): Staph hominis -  contaminant repeat BCx have been NGTD  - procal downtrending 0.2--0.16  - bolused 250 LR 2/20, levophed requirement is 0.1    plan:  - c/w levophed and midodrine.   - full rvp, UA, Blood Cx, CXR, fungitell, pan CT for hidden infection source   - ID 2/20: Repeat fungitell, Repeat MRSA nares- if (-) then D/C vancomycin, Recommend CT CAP, PO levaquin 500mg daily x 7 days end 2/21. Restarted on Meropenem 2/18 given fever/pressors , s/p extensive course. (1/21-1/27), 2/1-2/16, restarted 2/18 , on pressors , critical illness. Fungitell started downtrending 1 day after Caspo started, doubt related, but at this point given critical illness would plan on completing a course. Continue caspofungin 50mg q24h IV , hx unclear elevated fungitell. No clear source, ( empiric 14 day course was 2/18) No risk factors for PCP or other invasive fungal infection . Continuing for now.  - Failed FEES, put NG tube for feedings and medications, pt'll likely need PEG/trach when stable, consulted by GI   - Aspiration precautions, order chest PT vest   - Scopolamine patch d/c on 2/14  - supplement oxygen, monitor pulse Ox, on high flow oxygen for weeks   - c/w duoneb  - pulmonary is following, recommendations noted  - aggressive chest PT with vest, aspiration precautions and suction     # Elevated Troponin , type 2 MI/  Sinus tachycardia  - c/w telemetry monitoring   - Repeat EKG: Normal sinus rhythm  - c/w  metoprolol  -  maintain fluid balance   - TTE 2/19 normal EF no DD or valve abnormalities    # Seizure Disorder  - c/w  Keppra   - seizure precautions  - keep Mg above 2.0     # H/o lower ext DVT  - therapeutic Lovenox recommended by ICU team  - Eliquis held     # Hypothyroidism  - Thyroid Stimulating Hormone, Serum: 0.51 uIU/mL (01.21.24 @ 06:34)  - check FT4     # Normocytic Anemia, anemia of chronic disease   - monitor H/H, keep Hb above 7.5   - h/H trending down   - no active bleeding noted ( pt is on full AC)   - get anemia work up     # Down Syndrome/  Cerebral Palsy  - supportive care  - prevent falls and aspiration   - failed speech and swallow, needs PEG   - on Raloxifene     # Full code  - conversation started with patient's representative and progress note faxed to start considering DNR/DNI. for now full code.   La Nena Vasquez [phone]          Pending (specify): f/u  sepsis work up and ID,  pulmonary toilet, chest PT vest,  frequent suction, , pt needs PEG if becomes stable, daily CXR ,     overall prognosis is very poor, pt might benefit from comfort/end of life care in the nearest future.       58F w/ PMHx Down Syndrome, nonverbal at baseline, Hypothyroidism, Cerebral palsy and Seizure Disorders presents to the ED from nursing facility/group home ([facility]) presented to ED for respiratory distress and high grade fever. Patient found to be septic on admission. Admitted to SDU for management of acute respiratory distress 2/2 CAP/Aspiration PNA (20 Jan 2024 18:22)  While pt was on the floor she developed dyspnea after swallow evaluation, was spiking high grade fever, consulted by pulmonary/critical care and was upgraded back to SDU.    A/P  # Sepsis POA / Acute hypoxic resp failure / RSV bronchiolitis /  H/o Dysphagia/ suspected aspiration PNA /high grade fever   - CXR noted 2/20, stable left large opacities and small right opacity    - completed the course of  meropenem 15 days on 2/16, Meropenem was resumed 2/18 ( as per prior ID recommendations), pt is spiking high grade fever and wbc uptrending.   - Histo Ag negative, only positive blood cx is for coag -ve staph  - currently on HFNC 60L FiO2 80%  - hypotensive again 2/19 despite fluid boluses so started levophed on 2/19 - c/w   Midodrine  20 mg Q 8 hours   - MRSA negative   - BCx (1/20): Staph hominis -  contaminant repeat BCx have been NGTD  - procal downtrending 0.2--0.16  - bolused 250 LR 2/20, levophed requirement is 0.1  - CT chest with cavitation in [place], ID: same abx, obtain TB test    plan:  - c/w levophed and midodrine.   - full rvp, UA, Blood Cx, CXR, fungitell, pan CT for hidden infection source   - ID 2/20: Repeat fungitell, Repeat MRSA nares- if (-) then D/C vancomycin, Recommend CT CAP, PO levaquin 500mg daily x 7 days end 2/21. Restarted on Meropenem 2/18 given fever/pressors , s/p extensive course. (1/21-1/27), 2/1-2/16, restarted 2/18 , on pressors , critical illness. Fungitell started downtrending 1 day after Caspo started, doubt related, but at this point given critical illness would plan on completing a course. Continue caspofungin 50mg q24h IV , hx unclear elevated fungitell. No clear source, ( empiric 14 day course was 2/18) No risk factors for PCP or other invasive fungal infection . Continuing for now.  - Failed FEES, put NG tube for feedings and medications, pt'll likely need PEG/trach when stable, consulted by GI   - Aspiration precautions, order chest PT vest   - Scopolamine patch d/c on 2/14  - supplement oxygen, monitor pulse Ox, on high flow oxygen for weeks   - c/w duoneb  - pulmonary is following, recommendations noted  - aggressive chest PT with vest, aspiration precautions and suction     # Elevated Troponin , type 2 MI/  Sinus tachycardia  - c/w telemetry monitoring   - Repeat EKG: Normal sinus rhythm  - c/w  metoprolol  -  maintain fluid balance   - TTE 2/19 normal EF no DD or valve abnormalities    # Seizure Disorder  - c/w  Keppra   - seizure precautions  - keep Mg above 2.0     # H/o lower ext DVT  - therapeutic Lovenox recommended by ICU team  - Eliquis held     # Hypothyroidism  - Thyroid Stimulating Hormone, Serum: 0.51 uIU/mL (01.21.24 @ 06:34)  - check FT4     # Normocytic Anemia, anemia of chronic disease   - monitor H/H, keep Hb above 7.5   - h/H trending down   - no active bleeding noted ( pt is on full AC)   - get anemia work up     # Down Syndrome/  Cerebral Palsy  - supportive care  - prevent falls and aspiration   - failed speech and swallow, needs PEG   - on Raloxifene     # Full code  - conversation started with patient's representative and progress note faxed to start considering DNR/DNI. for now full code.   La Nena Vasquez [phone]          Pending (specify): f/u  sepsis work up and ID,  pulmonary toilet, chest PT vest,  frequent suction, , pt needs PEG if becomes stable, daily CXR ,     overall prognosis is very poor, pt might benefit from comfort/end of life care in the nearest future.

## 2024-02-21 NOTE — PROGRESS NOTE ADULT - ASSESSMENT
ASSESSMENT  58F w/ PMHx Down Syndrome, nonverbal at baseline, Hypothyroidism, Cerebral palsy and Seizure Disorders presents to the ED from nursing facility/group home presented to ED for respiratory distress and high grade fever.     IMPRESSION  #Fevers, resolving , HAP / aspiration with cavitary PNA  Doubt TB  < from: CT Chest w/ IV Cont (02.21.24 @ 00:09) >  Bilateral pneumonia, left worse than right, with a left upper lobe   thick-walled cavity. Diffuse opacification of the   left lung is seen with what appears to be some mucus plugging centrally   within the left mainstem bronchus. There is a pleural effusion.   Reactive mediastinal   lymph nodes are seen.    MRSA PCR Result.: Negative (02-20-24 @ 13:42)    Procalcitonin, Serum: 0.16 (02-19-24 @ 16:00)- unremarkable     2/17 BCX NGTD     Rapid RVP Result: NotDetec (02-17-24 @ 19:06)    2/12 BCX NGTD    Procalcitonin, Serum: 0.10 (02-12-24 @ 11:17)    Rapid RVP Result: NotDetec (02-12-24 @ 10:28)    MRSA PCR Result.: Negative (02-12-24 @ 10:28)    2/9 BCX NGTD x2    2/3 BCX NGTD     2/1 BCX NGTD     2/1 UCX   >=3 organisms. Probable collection contamination.    1/31 BCX NG    Rapid RVP Result: NotDetec (02-04-24 @ 10:28)    MRSA PCR Result.: Negative (02-03-24 @ 21:32)     UA without significant pyuria   Procalcitonin, Serum: 0.22 (02-01-24 @ 16:00)--> Procalcitonin, Serum: 0.15 (02-03-24 @ 11:13), downtrending ; unremarkable   MRSA PCR Result.: Negative (01-22-24 @ 05:30)  < from: Xray Chest 1 View-PORTABLE IMMEDIATE (Xray Chest 1 View-PORTABLE IMMEDIATE .) (02.01.24 @ 03:02) >  Bibasal opacities without significant change.   #Acute hypoxic respiratory failure- Gram Negative pneumonia   #1/20 BCX 1/4 bottles : Staphylococcus hominis- contaminant   Repeat CX NG   #Severe Sepsis on admission  #RSV + 1/21  #Elevated fungitell   serum aspergillus galactomannan and histo are (-)  Fungitell: 63 (02-06-24 @ 11:27)  Fungitell: 325 (01-20-24 @ 21:23)  Fungitell: 138 (11-07-23 @ 08:08)  Fungitell: 115 (11-02-23 @ 11:40)  Fungitell: >500 pg/mL (10.17.23 @ 17:20) s/p empiric caspo   #Full thickness ulcer right heel- Appreciate burn/podiatry evaluation.  #History of Right planter foot ulcers - two full thickness ulcers - serous drainage with mild erythema with OM  - MR Foot No Cont, Right (10.16.23 @ 21:51): IMPRESSION: 1.  Limited exam. 2.  Osteomyelitis of the first metatarsal stump. 3.  Osteomyelitis of the second toe distal phalanx.  - s/p excidional debridement to and including bone 1st metatarsal with partial 2nd digit amputation - 1st metatarsal head resected - Wound Cx Proteus ESBL   #CKD 2-3 Creatinine: 0.7 mg/dL (02.02.24 @ 04:59)      #History of buttock ulcer  #Down syndrome/Cerebral Palsy     RECOMMENDATIONS  - Repeat fungitell  - D/C Vanco   - D/C PO levaquin  - quantiferon gold  - Doubt TB  - Restarted on Meropenem 2/18 given fever/pressors , s/p extensive course. (1/21-1/27), 2/1-2/16, restarted 2/18 , on pressors , critical illness  - Fungitell started downtrending 1 day after Caspo started, doubt related, but at this point given critical illness would plan on completing a course  - Continue caspofungin 50mg q24h IV , hx unclear elevated fungitell. No clear source, ( empiric 14 day course was 2/18) No risk factors for PCP or other invasive fungal infection . Continuing for now  - Grave prognosis, GOC , aggressive care is futile    If any questions, please send a message or call on [employer] Teams  Please continue to update ID with any pertinent new laboratory or radiographic findings.

## 2024-02-21 NOTE — PROGRESS NOTE ADULT - ATTENDING COMMENTS
Medicine Attending Addendum  Patient was seen and examined with medicine team.  Nursing records reviewed. I agree with the resident/PA/NP's note including past medical history, home medications, social history, allergies, surgical history, family history, and review of system. I have reviewed relevant vitals, laboratory values, imaging studies, and microbiology.   - Thick walled cavitation within the right upper lung field, measuring nearly 3 cm on imaging -> r/o TB. Will place patient on isolation.  - mucus plugging centrally within the left mainstem bronchus on imaging -> will continue aggressive deep suction, a trial of NaCl 3% Neb q4H, and Chest Vest. RT aware  - rest of A/P as per detailed housestaff note above except above modifications

## 2024-02-21 NOTE — PROGRESS NOTE ADULT - SUBJECTIVE AND OBJECTIVE BOX
JERICHO TEA  58y, Female  Allergy: No Known Allergies      LOS  32d    CHIEF COMPLAINT: Respiratory Distress (21 Feb 2024 10:30)      INTERVAL EVENTS/HPI  - T(F): , Max: 99.5 (02-20-24 @ 20:05), CT with cavitary PNA  - WBC Count: 8.84 (02-21-24 @ 04:26)  WBC Count: 13.44 (02-20-24 @ 05:32)     - Creatinine: <0.5 (02-21-24 @ 04:26)  Creatinine: <0.5 (02-20-24 @ 05:32)     -   -   -     ROS  cannot obtain secondary to patient's sedation and/or mental status    VITALS:  T(F): 98.4, Max: 99.5 (02-20-24 @ 20:05)  HR: 66  BP: 95/52  RR: 20Vital Signs Last 24 Hrs  T(C): 36.9 (21 Feb 2024 11:31), Max: 37.5 (20 Feb 2024 20:05)  T(F): 98.4 (21 Feb 2024 11:31), Max: 99.5 (20 Feb 2024 20:05)  HR: 66 (21 Feb 2024 11:31) (55 - 93)  BP: 95/52 (21 Feb 2024 11:31) (79/59 - 129/63)  BP(mean): 61 (21 Feb 2024 11:31) (61 - 91)  RR: 20 (21 Feb 2024 11:31) (20 - 22)  SpO2: 95% (21 Feb 2024 11:31) (95% - 100%)    Parameters below as of 21 Feb 2024 11:31  Patient On (Oxygen Delivery Method): nasal cannula, high flow        PHYSICAL EXAM:  Gen: chronically ill appearing hfnc   HEENT: Normocephalic, atraumatic  Neck: supple, no lymphadenopathy  CV: Regular rate & regular rhythm  Lungs: decreased BS at bases, no fremitus  Abdomen: Soft, BS present  Ext: Warm, well perfused  Neuro: non focal, not following commands  Skin: no rash, no erythema  Lines: no phlebitis   FH: Non-contributory  Social Hx: Non-contributory    TESTS & MEASUREMENTS:                        8.0    8.84  )-----------( 512      ( 21 Feb 2024 04:26 )             26.6     02-21    139  |  97<L>  |  10  ----------------------------<  175<H>  4.4   |  31  |  <0.5<L>    Ca    8.1<L>      21 Feb 2024 04:26  Phos  2.8     02-21  Mg     2.1     02-21    TPro  5.7<L>  /  Alb  2.3<L>  /  TBili  <0.2  /  DBili  x   /  AST  22  /  ALT  16  /  AlkPhos  94  02-21      LIVER FUNCTIONS - ( 21 Feb 2024 04:26 )  Alb: 2.3 g/dL / Pro: 5.7 g/dL / ALK PHOS: 94 U/L / ALT: 16 U/L / AST: 22 U/L / GGT: x           Urinalysis Basic - ( 21 Feb 2024 04:26 )    Color: x / Appearance: x / SG: x / pH: x  Gluc: 175 mg/dL / Ketone: x  / Bili: x / Urobili: x   Blood: x / Protein: x / Nitrite: x   Leuk Esterase: x / RBC: x / WBC x   Sq Epi: x / Non Sq Epi: x / Bacteria: x        Culture - Blood (collected 02-19-24 @ 11:23)  Source: .Blood None  Preliminary Report (02-20-24 @ 20:02):    No growth at 24 hours    Culture - Blood (collected 02-17-24 @ 20:41)  Source: .Blood None  Preliminary Report (02-21-24 @ 06:00):    No growth at 72 Hours    Culture - Blood (collected 02-12-24 @ 11:17)  Source: .Blood None  Final Report (02-17-24 @ 21:00):    No growth at 5 days    Culture - Blood (collected 02-09-24 @ 11:57)  Source: .Blood None  Final Report (02-14-24 @ 22:00):    No growth at 5 days    Culture - Blood (collected 02-09-24 @ 11:57)  Source: .Blood None  Final Report (02-14-24 @ 22:00):    No growth at 5 days    Culture - Blood (collected 02-03-24 @ 11:13)  Source: .Blood None  Final Report (02-08-24 @ 20:00):    No growth at 5 days    Culture - Blood (collected 02-01-24 @ 11:58)  Source: .Blood None  Final Report (02-06-24 @ 23:06):    No growth at 5 days    Culture - Urine (collected 02-01-24 @ 11:40)  Source: Clean Catch Clean Catch (Midstream)  Final Report (02-03-24 @ 00:06):    >=3 organisms. Probable collection contamination.    Culture - Blood (collected 01-31-24 @ 18:21)  Source: .Blood None  Final Report (02-06-24 @ 01:01):    No growth at 5 days    Culture - Blood (collected 01-31-24 @ 18:21)  Source: .Blood None  Final Report (02-06-24 @ 01:01):    No growth at 5 days    Culture - Urine (collected 01-23-24 @ 05:20)  Source: Clean Catch Clean Catch (Midstream)  Final Report (01-25-24 @ 00:26):    No growth    Culture - Blood (collected 01-22-24 @ 16:51)  Source: .Blood None  Final Report (01-28-24 @ 01:00):    No growth at 5 days            INFECTIOUS DISEASES TESTING  MRSA PCR Result.: Negative (02-20-24 @ 13:42)  Procalcitonin, Serum: 0.16 (02-19-24 @ 16:00)  Procalcitonin, Serum: 0.20 (02-19-24 @ 11:23)  Rapid RVP Result: NotDetec (02-17-24 @ 19:06)  Procalcitonin, Serum: 0.11 (02-17-24 @ 10:41)  MRSA PCR Result.: Negative (02-15-24 @ 06:20)  Procalcitonin, Serum: 0.10 (02-12-24 @ 11:17)  Rapid RVP Result: NotDetec (02-12-24 @ 10:28)  MRSA PCR Result.: Negative (02-12-24 @ 10:28)  Fungitell: 63 (02-06-24 @ 11:27)  Fungitell: 65 (02-06-24 @ 04:43)  Rapid RVP Result: NotDetec (02-04-24 @ 10:28)  MRSA PCR Result.: Negative (02-03-24 @ 21:32)  Procalcitonin, Serum: 0.15 (02-03-24 @ 11:13)  Procalcitonin, Serum: 0.22 (02-01-24 @ 16:00)  MRSA PCR Result.: Negative (01-22-24 @ 05:30)  Legionella Antigen, Urine: Negative (01-21-24 @ 05:00)  Rapid RVP Result: Detected (01-21-24 @ 00:58)  Procalcitonin, Serum: 0.51 (01-20-24 @ 21:23)  Fungitell: 325 (01-20-24 @ 21:23)  Fungitell: 138 (11-07-23 @ 08:08)  Fungitell: 115 (11-02-23 @ 11:40)  Procalcitonin, Serum: 0.04 (11-02-23 @ 11:40)  Procalcitonin, Serum: 0.26 (10-24-23 @ 11:00)  MRSA PCR Result.: Negative (10-17-23 @ 17:20)  Fungitell: >500 (10-17-23 @ 17:20)  Procalcitonin, Serum: 4.36 (10-17-23 @ 17:20)  Procalcitonin, Serum: 4.18 (10-17-23 @ 12:35)  Procalcitonin, Serum: 0.07 (10-15-23 @ 07:28)  COVID-19 PCR: NotDetec (10-12-23 @ 09:14)  Procalcitonin, Serum: 0.40 (10-07-23 @ 10:54)  Legionella Antigen, Urine: Negative (09-30-23 @ 14:36)  MRSA PCR Result.: Negative (09-29-23 @ 16:50)  Procalcitonin, Serum: 9.78 (08-12-23 @ 11:25)  MRSA PCR Result.: Negative (08-12-23 @ 06:10)  Rapid RVP Result: NotDetec (08-12-23 @ 01:33)  Procalcitonin, Serum: 0.63 (08-10-23 @ 08:46)  Procalcitonin, Serum: 7.82 (08-04-23 @ 16:13)  MRSA PCR Result.: Negative (08-04-23 @ 14:52)  Rapid RVP Result: NotDetec (08-04-23 @ 03:00)      INFLAMMATORY MARKERS  Sedimentation Rate, Erythrocyte: 100 mm/Hr (02-03-24 @ 11:13)  C-Reactive Protein, Serum: 214.2 mg/L (02-03-24 @ 11:13)  C-Reactive Protein, Serum: 132.3 mg/L (02-01-24 @ 16:00)  Sedimentation Rate, Erythrocyte: 67 mm/Hr (10-15-23 @ 07:28)      RADIOLOGY & ADDITIONAL TESTS:  I have personally reviewed the last available Chest xray  CXR      CT  CT Abdomen and Pelvis w/ IV Cont:   ACC: [id number] EXAM:  CT ABDOMEN AND PELVIS IC   ORDERED BY: IOANA SIMMONS     PROCEDURE DATE:  02/21/2024          INTERPRETATION:  CLINICAL STATEMENT: Abscess    TECHNIQUE: Contiguous axial CT images were obtained from the lower chest   to the pubic symphysis following administration of intravenous contrast.    IV contrast documented in unlinked concurrent exam ( ).  Oral contrast   was not administered.  Reformatted images in the coronal and sagittal   planes were acquired.    COMPARISONCT: September 29, 2023    OTHER STUDIES USED FOR CORRELATION: None.      FINDINGS:    HEPATOBILIARY: Contracted gallbladder with cholelithiasis versus wall   calcifications. Enlarged liver measures 21 cm in craniocaudal dimension.    SPLEEN: Unremarkable.    PANCREAS: Unremarkable.    ADRENAL GLANDS: Unremarkable.    KIDNEYS: Symmetric renal enhancement bilaterally. No hydronephrosis.    ABDOMINOPELVIC NODES: No lymphadenopathy.    PELVIC ORGANS: Unremarkable.    PERITONEUM/MESENTERY/BOWEL: No bowel obstruction, ascites or   pneumoperitoneum. Small hiatal hernia.    BONES/SOFT TISSUES: Degenerative changes of the spine noted. Bilateral L5   pars defects with grade 2 anterolisthesis of L5 on S1.      IMPRESSION:  1.  No CT evidence of an acuteabdominopelvic pathology.    See separately dictated CT chest report for intrathoracic findings.    --- End of Report ---            PATRICIA CHAVEZ MD; Attending Radiologist  This document has been electronically signed. Feb 21 2024 10:16AM (02-21-24 @ 00:10)      CARDIOLOGY TESTING      MEDICATIONS  acetylcysteine 20%  Inhalation 4  albuterol/ipratropium for Nebulization 3  AQUAPHOR (petrolatum Ointment) 1  artificial  tears Solution 1  calcium carbonate   1250 mG (OsCal) 1  caspofungin IVPB   caspofungin IVPB 50  chlorhexidine 2% Cloths 1  enoxaparin Injectable 50  gabapentin 300  levETIRAcetam  Solution 500  levoFLOXacin  Tablet 500  melatonin 3  meropenem  IVPB 1000  midodrine. 20  norepinephrine Infusion 0.05  pantoprazole  Injectable 40  polyethylene glycol 3350 17  raloxifene 60  saccharomyces boulardii 250  senna 2  sodium chloride 0.65% Nasal 2  sodium chloride 3%  Inhalation 4      WEIGHT  Weight (kg): 52.3 (01-21-24 @ 08:00)  Creatinine: <0.5 mg/dL (02-21-24 @ 04:26)      ANTIBIOTICS:  caspofungin IVPB 50 milliGRAM(s) IV Intermittent every 24 hours  caspofungin IVPB      levoFLOXacin  Tablet 500 milliGRAM(s) Oral once  meropenem  IVPB 1000 milliGRAM(s) IV Intermittent every 8 hours      All available historical records have been reviewed

## 2024-02-22 LAB
ALBUMIN SERPL ELPH-MCNC: 2.7 G/DL — LOW (ref 3.5–5.2)
ALP SERPL-CCNC: 92 U/L — SIGNIFICANT CHANGE UP (ref 30–115)
ALT FLD-CCNC: 26 U/L — SIGNIFICANT CHANGE UP (ref 0–41)
ANION GAP SERPL CALC-SCNC: 10 MMOL/L — SIGNIFICANT CHANGE UP (ref 7–14)
AST SERPL-CCNC: 25 U/L — SIGNIFICANT CHANGE UP (ref 0–41)
BASOPHILS # BLD AUTO: 0.1 K/UL — SIGNIFICANT CHANGE UP (ref 0–0.2)
BASOPHILS NFR BLD AUTO: 1.8 % — HIGH (ref 0–1)
BILIRUB SERPL-MCNC: <0.2 MG/DL — SIGNIFICANT CHANGE UP (ref 0.2–1.2)
BUN SERPL-MCNC: 10 MG/DL — SIGNIFICANT CHANGE UP (ref 10–20)
CALCIUM SERPL-MCNC: 9 MG/DL — SIGNIFICANT CHANGE UP (ref 8.4–10.4)
CHLORIDE SERPL-SCNC: 94 MMOL/L — LOW (ref 98–110)
CO2 SERPL-SCNC: 32 MMOL/L — SIGNIFICANT CHANGE UP (ref 17–32)
CREAT SERPL-MCNC: <0.5 MG/DL — LOW (ref 0.7–1.5)
EGFR: 115 ML/MIN/1.73M2 — SIGNIFICANT CHANGE UP
EOSINOPHIL # BLD AUTO: 0.28 K/UL — SIGNIFICANT CHANGE UP (ref 0–0.7)
EOSINOPHIL NFR BLD AUTO: 5.3 % — SIGNIFICANT CHANGE UP (ref 0–8)
GLUCOSE BLDC GLUCOMTR-MCNC: 105 MG/DL — HIGH (ref 70–99)
GLUCOSE BLDC GLUCOMTR-MCNC: 111 MG/DL — HIGH (ref 70–99)
GLUCOSE BLDC GLUCOMTR-MCNC: 114 MG/DL — HIGH (ref 70–99)
GLUCOSE BLDC GLUCOMTR-MCNC: 116 MG/DL — HIGH (ref 70–99)
GLUCOSE BLDC GLUCOMTR-MCNC: 96 MG/DL — SIGNIFICANT CHANGE UP (ref 70–99)
GLUCOSE SERPL-MCNC: 108 MG/DL — HIGH (ref 70–99)
HCT VFR BLD CALC: 26.9 % — LOW (ref 37–47)
HGB BLD-MCNC: 8.2 G/DL — LOW (ref 12–16)
LYMPHOCYTES # BLD AUTO: 0.62 K/UL — LOW (ref 1.2–3.4)
LYMPHOCYTES # BLD AUTO: 11.5 % — LOW (ref 20.5–51.1)
MAGNESIUM SERPL-MCNC: 2.4 MG/DL — SIGNIFICANT CHANGE UP (ref 1.8–2.4)
MCHC RBC-ENTMCNC: 23.4 PG — LOW (ref 27–31)
MCHC RBC-ENTMCNC: 30.5 G/DL — LOW (ref 32–37)
MCV RBC AUTO: 76.9 FL — LOW (ref 81–99)
MONOCYTES # BLD AUTO: 0.33 K/UL — SIGNIFICANT CHANGE UP (ref 0.1–0.6)
MONOCYTES NFR BLD AUTO: 6.2 % — SIGNIFICANT CHANGE UP (ref 1.7–9.3)
NEUTROPHILS # BLD AUTO: 3.93 K/UL — SIGNIFICANT CHANGE UP (ref 1.4–6.5)
NEUTROPHILS NFR BLD AUTO: 73.4 % — SIGNIFICANT CHANGE UP (ref 42.2–75.2)
PHOSPHATE SERPL-MCNC: 4 MG/DL — SIGNIFICANT CHANGE UP (ref 2.1–4.9)
PLATELET # BLD AUTO: 560 K/UL — HIGH (ref 130–400)
PMV BLD: 10.3 FL — SIGNIFICANT CHANGE UP (ref 7.4–10.4)
POTASSIUM SERPL-MCNC: 4.4 MMOL/L — SIGNIFICANT CHANGE UP (ref 3.5–5)
POTASSIUM SERPL-SCNC: 4.4 MMOL/L — SIGNIFICANT CHANGE UP (ref 3.5–5)
PROT SERPL-MCNC: 6.4 G/DL — SIGNIFICANT CHANGE UP (ref 6–8)
RBC # BLD: 3.5 M/UL — LOW (ref 4.2–5.4)
RBC # FLD: 20.9 % — HIGH (ref 11.5–14.5)
SODIUM SERPL-SCNC: 136 MMOL/L — SIGNIFICANT CHANGE UP (ref 135–146)
WBC # BLD: 5.36 K/UL — SIGNIFICANT CHANGE UP (ref 4.8–10.8)
WBC # FLD AUTO: 5.36 K/UL — SIGNIFICANT CHANGE UP (ref 4.8–10.8)

## 2024-02-22 PROCEDURE — 71045 X-RAY EXAM CHEST 1 VIEW: CPT | Mod: 26,76

## 2024-02-22 PROCEDURE — 99233 SBSQ HOSP IP/OBS HIGH 50: CPT

## 2024-02-22 PROCEDURE — 99232 SBSQ HOSP IP/OBS MODERATE 35: CPT

## 2024-02-22 PROCEDURE — 71045 X-RAY EXAM CHEST 1 VIEW: CPT | Mod: 26,77

## 2024-02-22 RX ADMIN — Medication 3 MILLILITER(S): at 19:17

## 2024-02-22 RX ADMIN — PANTOPRAZOLE SODIUM 40 MILLIGRAM(S): 20 TABLET, DELAYED RELEASE ORAL at 06:14

## 2024-02-22 RX ADMIN — Medication 3 MILLILITER(S): at 12:45

## 2024-02-22 RX ADMIN — Medication 3 MILLILITER(S): at 15:48

## 2024-02-22 RX ADMIN — ENOXAPARIN SODIUM 50 MILLIGRAM(S): 100 INJECTION SUBCUTANEOUS at 06:14

## 2024-02-22 RX ADMIN — CASPOFUNGIN ACETATE 260 MILLIGRAM(S): 7 INJECTION, POWDER, LYOPHILIZED, FOR SOLUTION INTRAVENOUS at 13:39

## 2024-02-22 RX ADMIN — LEVETIRACETAM 400 MILLIGRAM(S): 250 TABLET, FILM COATED ORAL at 18:11

## 2024-02-22 RX ADMIN — CHLORHEXIDINE GLUCONATE 1 APPLICATION(S): 213 SOLUTION TOPICAL at 13:39

## 2024-02-22 RX ADMIN — Medication 2 SPRAY(S): at 13:38

## 2024-02-22 RX ADMIN — MEROPENEM 100 MILLIGRAM(S): 1 INJECTION INTRAVENOUS at 06:06

## 2024-02-22 RX ADMIN — MEROPENEM 100 MILLIGRAM(S): 1 INJECTION INTRAVENOUS at 13:44

## 2024-02-22 RX ADMIN — LEVETIRACETAM 400 MILLIGRAM(S): 250 TABLET, FILM COATED ORAL at 06:06

## 2024-02-22 RX ADMIN — Medication 2 SPRAY(S): at 06:09

## 2024-02-22 RX ADMIN — Medication 1 APPLICATION(S): at 06:12

## 2024-02-22 RX ADMIN — Medication 4 MILLILITER(S): at 19:20

## 2024-02-22 RX ADMIN — Medication 2 SPRAY(S): at 00:16

## 2024-02-22 RX ADMIN — Medication 1 DROP(S): at 18:12

## 2024-02-22 RX ADMIN — Medication 4 MILLILITER(S): at 09:27

## 2024-02-22 RX ADMIN — Medication 2 SPRAY(S): at 23:55

## 2024-02-22 RX ADMIN — Medication 3 MILLILITER(S): at 09:24

## 2024-02-22 RX ADMIN — SODIUM CHLORIDE 4 MILLILITER(S): 9 INJECTION INTRAMUSCULAR; INTRAVENOUS; SUBCUTANEOUS at 09:09

## 2024-02-22 RX ADMIN — Medication 1 DROP(S): at 06:13

## 2024-02-22 RX ADMIN — Medication 4 MILLILITER(S): at 15:47

## 2024-02-22 RX ADMIN — Medication 1 APPLICATION(S): at 18:12

## 2024-02-22 RX ADMIN — Medication 4 MILLILITER(S): at 12:47

## 2024-02-22 RX ADMIN — ENOXAPARIN SODIUM 50 MILLIGRAM(S): 100 INJECTION SUBCUTANEOUS at 18:02

## 2024-02-22 NOTE — PROGRESS NOTE ADULT - SUBJECTIVE AND OBJECTIVE BOX
Patient is a 58y old  Female who presents with a chief complaint of Respiratory Distress (22 Feb 2024 08:47)        Over Night Events: No overnight events, Still on HFNC 50/60      ROS:     All ROS are negative except HPI         PHYSICAL EXAM    ICU Vital Signs Last 24 Hrs  T(C): 36.8 (22 Feb 2024 07:42), Max: 37.6 (21 Feb 2024 16:00)  T(F): 98.2 (22 Feb 2024 07:42), Max: 99.6 (21 Feb 2024 16:00)  HR: 104 (22 Feb 2024 07:42) (66 - 104)  BP: 98/55 (22 Feb 2024 07:42) (84/48 - 111/54)  BP(mean): 70 (22 Feb 2024 07:42) (61 - 79)  ABP: --  ABP(mean): --  RR: 20 (22 Feb 2024 07:42) (18 - 20)  SpO2: 95% (22 Feb 2024 07:42) (93% - 95%)    O2 Parameters below as of 22 Feb 2024 07:42  Patient On (Oxygen Delivery Method): nasal cannula, high flow            CONSTITUTIONAL:  ill appearing     CARDIAC:   Normal rate,   Regular rhythm.    No edema    RESPIRATORY:   BL crackles     GASTROINTESTINAL:  Abdomen soft,   Non-tender,   No guarding,     NEUROLOGICAL:   Doesnt follow commands     SKIN:   Skin normal color for race,   Warm and dry and intact.   No evidence of rash.    02-21-24 @ 07:01  -  02-22-24 @ 07:00  --------------------------------------------------------  IN:    Norepinephrine: 38.3 mL  Total IN: 38.3 mL    OUT:  Total OUT: 0 mL    Total NET: 38.3 mL        LABS:                            8.2    5.36  )-----------( 560      ( 22 Feb 2024 06:53 )             26.9                                               02-22    136  |  94<L>  |  10  ----------------------------<  108<H>  4.4   |  32  |  <0.5<L>    Ca    9.0      22 Feb 2024 06:53  Phos  4.0     02-22  Mg     2.4     02-22    TPro  6.4  /  Alb  2.7<L>  /  TBili  <0.2  /  DBili  x   /  AST  25  /  ALT  26  /  AlkPhos  92  02-22                                             Urinalysis Basic - ( 22 Feb 2024 06:53 )    Color: x / Appearance: x / SG: x / pH: x  Gluc: 108 mg/dL / Ketone: x  / Bili: x / Urobili: x   Blood: x / Protein: x / Nitrite: x   Leuk Esterase: x / RBC: x / WBC x   Sq Epi: x / Non Sq Epi: x / Bacteria: x                                                  LIVER FUNCTIONS - ( 22 Feb 2024 06:53 )  Alb: 2.7 g/dL / Pro: 6.4 g/dL / ALK PHOS: 92 U/L / ALT: 26 U/L / AST: 25 U/L / GGT: x                                                  Culture - Blood (collected 19 Feb 2024 11:23)  Source: .Blood None  Preliminary Report (21 Feb 2024 20:01):    No growth at 48 Hours                                                                                           MEDICATIONS  (STANDING):  acetylcysteine 20%  Inhalation 4 milliLiter(s) Inhalation every 4 hours  albuterol/ipratropium for Nebulization 3 milliLiter(s) Nebulizer every 4 hours  AQUAPHOR (petrolatum Ointment) 1 Application(s) Topical two times a day  artificial  tears Solution 1 Drop(s) Both EYES two times a day  calcium carbonate   1250 mG (OsCal) 1 Tablet(s) Oral two times a day  caspofungin IVPB 50 milliGRAM(s) IV Intermittent every 24 hours  caspofungin IVPB      chlorhexidine 2% Cloths 1 Application(s) Topical daily  enoxaparin Injectable 50 milliGRAM(s) SubCutaneous every 12 hours  gabapentin 300 milliGRAM(s) Oral every 12 hours  levETIRAcetam  IVPB 500 milliGRAM(s) IV Intermittent two times a day  melatonin 3 milliGRAM(s) Oral at bedtime  meropenem  IVPB 1000 milliGRAM(s) IV Intermittent every 8 hours  midodrine. 20 milliGRAM(s) Oral three times a day  norepinephrine Infusion 0.05 MICROgram(s)/kG/Min (4.9 mL/Hr) IV Continuous <Continuous>  pantoprazole  Injectable 40 milliGRAM(s) IV Push every 24 hours  polyethylene glycol 3350 17 Gram(s) Oral at bedtime  raloxifene 60 milliGRAM(s) Oral daily  saccharomyces boulardii 250 milliGRAM(s) Oral two times a day  senna 2 Tablet(s) Oral at bedtime  sodium chloride 0.65% Nasal 2 Spray(s) Both Nostrils three times a day  sodium chloride 3%  Inhalation 4 milliLiter(s) Inhalation every 4 hours    MEDICATIONS  (PRN):  acetaminophen     Tablet .. 650 milliGRAM(s) Oral every 6 hours PRN Temp greater or equal to 38C (100.4F), Mild Pain (1 - 3)  aluminum hydroxide/magnesium hydroxide/simethicone Suspension 30 milliLiter(s) Oral every 4 hours PRN Dyspepsia  ondansetron Injectable 4 milliGRAM(s) IV Push every 8 hours PRN Nausea and/or Vomiting      New X-rays reviewed:                                                                                  ECHO

## 2024-02-22 NOTE — PROGRESS NOTE ADULT - ASSESSMENT
ASSESSMENT  58F w/ PMHx Down Syndrome, nonverbal at baseline, Hypothyroidism, Cerebral palsy and Seizure Disorders presents to the ED from nursing facility/group home presented to ED for respiratory distress and high grade fever.     IMPRESSION  #Fevers, resolving , HAP / aspiration with cavitary PNA  Doubt TB, admission CXR without findings   < from: CT Chest w/ IV Cont (02.21.24 @ 00:09) >  Bilateral pneumonia, left worse than right, with a left upper lobe   thick-walled cavity. Diffuse opacification of the   left lung is seen with what appears to be some mucus plugging centrally   within the left mainstem bronchus. There is a pleural effusion.   Reactive mediastinal   lymph nodes are seen.    MRSA PCR Result.: Negative (02-20-24 @ 13:42)    Procalcitonin, Serum: 0.16 (02-19-24 @ 16:00)- unremarkable     2/17 BCX NGTD     Rapid RVP Result: NotDetec (02-17-24 @ 19:06)    2/12 BCX NGTD    Procalcitonin, Serum: 0.10 (02-12-24 @ 11:17)    Rapid RVP Result: NotDetec (02-12-24 @ 10:28)    MRSA PCR Result.: Negative (02-12-24 @ 10:28)    2/9 BCX NGTD x2    2/3 BCX NGTD     2/1 BCX NGTD     2/1 UCX   >=3 organisms. Probable collection contamination.    1/31 BCX NG    Rapid RVP Result: NotDetec (02-04-24 @ 10:28)    MRSA PCR Result.: Negative (02-03-24 @ 21:32)     UA without significant pyuria   Procalcitonin, Serum: 0.22 (02-01-24 @ 16:00)--> Procalcitonin, Serum: 0.15 (02-03-24 @ 11:13), downtrending ; unremarkable   MRSA PCR Result.: Negative (01-22-24 @ 05:30)  < from: Xray Chest 1 View-PORTABLE IMMEDIATE (Xray Chest 1 View-PORTABLE IMMEDIATE .) (02.01.24 @ 03:02) >  Bibasal opacities without significant change.   #Acute hypoxic respiratory failure- Gram Negative pneumonia   #1/20 BCX 1/4 bottles : Staphylococcus hominis- contaminant   Repeat CX NG   #Severe Sepsis on admission  #RSV + 1/21  #Elevated fungitell   serum aspergillus galactomannan and histo are (-)  Fungitell: 76 (02-19-24 @ 16:00)  Fungitell: 63 (02-06-24 @ 11:27)  Fungitell: 325 (01-20-24 @ 21:23)  Fungitell: 138 (11-07-23 @ 08:08)  Fungitell: 115 (11-02-23 @ 11:40)  Fungitell: >500 pg/mL (10.17.23 @ 17:20) s/p empiric caspo   #Full thickness ulcer right heel- Appreciate burn/podiatry evaluation.  #History of Right planter foot ulcers - two full thickness ulcers - serous drainage with mild erythema with OM  - MR Foot No Cont, Right (10.16.23 @ 21:51): IMPRESSION: 1.  Limited exam. 2.  Osteomyelitis of the first metatarsal stump. 3.  Osteomyelitis of the second toe distal phalanx.  - s/p excidional debridement to and including bone 1st metatarsal with partial 2nd digit amputation - 1st metatarsal head resected - Wound Cx Proteus ESBL   #CKD 2-3 Creatinine: 0.7 mg/dL (02.02.24 @ 04:59)      #History of buttock ulcer  #Down syndrome/Cerebral Palsy     RECOMMENDATIONS  - Repeat fungitell  - quantiferon gold  - Doubt TB- no need for airborne isolation  - Restarted on Meropenem 2/18 given fever/pressors , s/p extensive course. (1/21-1/27), 2/1-2/16, restarted 2/18 , on pressors , critical illness and suspected lung abscess   - Fungitell started downtrending 1 day after Caspo started, doubt related, but at this point given critical illness would plan on completing a course  - Continue caspofungin 50mg q24h IV , hx unclear elevated fungitell. No clear source, ( empiric 14 day course was 2/18) No risk factors for PCP or other invasive fungal infection . Continuing for now  - Grave prognosis, GOC , aggressive care is futile    If any questions, please send a message or call on [employer] Teams  Please continue to update ID with any pertinent new laboratory or radiographic findings.

## 2024-02-22 NOTE — PROGRESS NOTE ADULT - ASSESSMENT
IMPRESSION:    Acute hypoxemic respiratory failure  Likely aspiration pneumonia   Sepsis POA  Septic shock off Levophed   Recurrent aspiration pneumonia / prior intubation  HO DVT on Eliquis   HO GI bleed  HO OM  HO recent duodenal perforation   HO polymicrobial bacteremia   H/o CP, DS  H/o seizures    PLAN:    CNS:  Avoid CNS Depressant, AED. MS at baseline.     HEENT: Oral care.     PULMONARY: HOB at 45 degrees.  Aspiration precaution. Wean FiO2 Keep spo2 more than 92%.  CXR noted. Aggressive pulm toilet, frequent suctioning.     CARDIOVASCULAR: Keep MAP more than 60. Avoid overload. C/w midodrine 20mg.  Goal directed fluid resuscitation.    GI: Protonix. NG  feeding.  Might need PEG when more stable    INFECTIOUS DISEASE:  ABX and Anti fungal per ID.      HEMATOLOGICAL:  Lovenox therapeutic.  Monitor CBC    ENDOCRINE:  Follow up FS.  Insulin protocol if needed.    MUSCULOSKELETAL: Bedrest.  Off loading.  Wound care.    Prognosis very poor.    Palliative care following    SDU. IMPRESSION:    Acute hypoxemic respiratory failure  Likely aspiration pneumonia   Sepsis POA  Septic shock off Levophed   Recurrent aspiration pneumonia / prior intubation  HO DVT on Eliquis   HO GI bleed  HO OM  HO recent duodenal perforation   HO polymicrobial bacteremia   H/o CP, DS  H/o seizures    PLAN:    CNS:  Avoid CNS Depressant, AED. MS at baseline.     HEENT: Oral care.     PULMONARY: HOB at 45 degrees.  Aspiration precaution. Wean FiO2 Keep spo2 more than 92%.  CXR noted. Aggressive pulm toilet, frequent suctioning.     CARDIOVASCULAR: Keep MAP more than 60. Avoid overload. C/w midodrine 20mg.  Goal directed fluid resuscitation.    GI: Protonix. NG  feeding.  Might need PEG when more stable    INFECTIOUS DISEASE:  ABX and Anti fungal per ID.  DW ID.  DOubt Tb.  Can DC isolation     HEMATOLOGICAL:  Lovenox therapeutic.  Monitor CBC    ENDOCRINE:  Follow up FS.  Insulin protocol if needed.    MUSCULOSKELETAL: Bedrest.  Off loading.  Wound care.    Prognosis very poor.    Palliative care following    SDU.

## 2024-02-22 NOTE — PROGRESS NOTE ADULT - SUBJECTIVE AND OBJECTIVE BOX
JERICHO TEA  58y, Female  Allergy: No Known Allergies      LOS  33d    CHIEF COMPLAINT: Respiratory Distress (22 Feb 2024 12:18)      INTERVAL EVENTS/HPI  - T(F): , Max: 99.6 (02-21-24 @ 16:00) hfnc   - WBC Count: 5.36 (02-22-24 @ 06:53)  WBC Count: 8.84 (02-21-24 @ 04:26)     - Creatinine: <0.5 (02-22-24 @ 06:53)  Creatinine: <0.5 (02-21-24 @ 04:26)     -   -   -     ROS  cannot obtain secondary to patient's sedation and/or mental status    VITALS:  T(F): 97.9, Max: 99.6 (02-21-24 @ 16:00)  HR: 106  BP: 137/76  RR: 20Vital Signs Last 24 Hrs  T(C): 36.6 (22 Feb 2024 11:00), Max: 37.6 (21 Feb 2024 16:00)  T(F): 97.9 (22 Feb 2024 11:00), Max: 99.6 (21 Feb 2024 16:00)  HR: 106 (22 Feb 2024 11:00) (91 - 106)  BP: 137/76 (22 Feb 2024 11:00) (84/48 - 137/76)  BP(mean): 96 (22 Feb 2024 11:00) (64 - 96)  RR: 20 (22 Feb 2024 11:00) (18 - 20)  SpO2: 93% (22 Feb 2024 11:00) (93% - 95%)    Parameters below as of 22 Feb 2024 11:00  Patient On (Oxygen Delivery Method): nasal cannula, high flow        PHYSICAL EXAM:  Gen: chronically ill appearing hfnc   HEENT: Normocephalic, atraumatic  Neck: supple, no lymphadenopathy  CV: Regular rate & regular rhythm  Lungs: decreased BS at bases, no fremitus  Abdomen: Soft, BS present  Ext: Warm, well perfused  Neuro: non focal, not following commands  Skin: no rash, no erythema  Lines: no phlebitis     FH: Non-contributory  Social Hx: Non-contributory    TESTS & MEASUREMENTS:                        8.2    5.36  )-----------( 560      ( 22 Feb 2024 06:53 )             26.9     02-22    136  |  94<L>  |  10  ----------------------------<  108<H>  4.4   |  32  |  <0.5<L>    Ca    9.0      22 Feb 2024 06:53  Phos  4.0     02-22  Mg     2.4     02-22    TPro  6.4  /  Alb  2.7<L>  /  TBili  <0.2  /  DBili  x   /  AST  25  /  ALT  26  /  AlkPhos  92  02-22      LIVER FUNCTIONS - ( 22 Feb 2024 06:53 )  Alb: 2.7 g/dL / Pro: 6.4 g/dL / ALK PHOS: 92 U/L / ALT: 26 U/L / AST: 25 U/L / GGT: x           Urinalysis Basic - ( 22 Feb 2024 06:53 )    Color: x / Appearance: x / SG: x / pH: x  Gluc: 108 mg/dL / Ketone: x  / Bili: x / Urobili: x   Blood: x / Protein: x / Nitrite: x   Leuk Esterase: x / RBC: x / WBC x   Sq Epi: x / Non Sq Epi: x / Bacteria: x        Culture - Blood (collected 02-19-24 @ 11:23)  Source: .Blood None  Preliminary Report (02-21-24 @ 20:01):    No growth at 48 Hours    Culture - Blood (collected 02-17-24 @ 20:41)  Source: .Blood None  Preliminary Report (02-22-24 @ 06:01):    No growth at 4 days    Culture - Blood (collected 02-12-24 @ 11:17)  Source: .Blood None  Final Report (02-17-24 @ 21:00):    No growth at 5 days    Culture - Blood (collected 02-09-24 @ 11:57)  Source: .Blood None  Final Report (02-14-24 @ 22:00):    No growth at 5 days    Culture - Blood (collected 02-09-24 @ 11:57)  Source: .Blood None  Final Report (02-14-24 @ 22:00):    No growth at 5 days    Culture - Blood (collected 02-03-24 @ 11:13)  Source: .Blood None  Final Report (02-08-24 @ 20:00):    No growth at 5 days    Culture - Blood (collected 02-01-24 @ 11:58)  Source: .Blood None  Final Report (02-06-24 @ 23:06):    No growth at 5 days    Culture - Urine (collected 02-01-24 @ 11:40)  Source: Clean Catch Clean Catch (Midstream)  Final Report (02-03-24 @ 00:06):    >=3 organisms. Probable collection contamination.    Culture - Blood (collected 01-31-24 @ 18:21)  Source: .Blood None  Final Report (02-06-24 @ 01:01):    No growth at 5 days    Culture - Blood (collected 01-31-24 @ 18:21)  Source: .Blood None  Final Report (02-06-24 @ 01:01):    No growth at 5 days            INFECTIOUS DISEASES TESTING  MRSA PCR Result.: Negative (02-20-24 @ 13:42)  Fungitell: 76 (02-19-24 @ 16:00)  Procalcitonin, Serum: 0.16 (02-19-24 @ 16:00)  Procalcitonin, Serum: 0.20 (02-19-24 @ 11:23)  Rapid RVP Result: NotDetec (02-17-24 @ 19:06)  Procalcitonin, Serum: 0.11 (02-17-24 @ 10:41)  MRSA PCR Result.: Negative (02-15-24 @ 06:20)  Procalcitonin, Serum: 0.10 (02-12-24 @ 11:17)  Rapid RVP Result: NotDetec (02-12-24 @ 10:28)  MRSA PCR Result.: Negative (02-12-24 @ 10:28)  Fungitell: 63 (02-06-24 @ 11:27)  Fungitell: 65 (02-06-24 @ 04:43)  Rapid RVP Result: NotDetec (02-04-24 @ 10:28)  MRSA PCR Result.: Negative (02-03-24 @ 21:32)  Procalcitonin, Serum: 0.15 (02-03-24 @ 11:13)  Procalcitonin, Serum: 0.22 (02-01-24 @ 16:00)  MRSA PCR Result.: Negative (01-22-24 @ 05:30)  Legionella Antigen, Urine: Negative (01-21-24 @ 05:00)  Rapid RVP Result: Detected (01-21-24 @ 00:58)  Procalcitonin, Serum: 0.51 (01-20-24 @ 21:23)  Fungitell: 325 (01-20-24 @ 21:23)  Fungitell: 138 (11-07-23 @ 08:08)  Fungitell: 115 (11-02-23 @ 11:40)  Procalcitonin, Serum: 0.04 (11-02-23 @ 11:40)  Procalcitonin, Serum: 0.26 (10-24-23 @ 11:00)  MRSA PCR Result.: Negative (10-17-23 @ 17:20)  Fungitell: >500 (10-17-23 @ 17:20)  Procalcitonin, Serum: 4.36 (10-17-23 @ 17:20)  Procalcitonin, Serum: 4.18 (10-17-23 @ 12:35)  Procalcitonin, Serum: 0.07 (10-15-23 @ 07:28)  COVID-19 PCR: NotDetec (10-12-23 @ 09:14)  Procalcitonin, Serum: 0.40 (10-07-23 @ 10:54)  Legionella Antigen, Urine: Negative (09-30-23 @ 14:36)  MRSA PCR Result.: Negative (09-29-23 @ 16:50)  Procalcitonin, Serum: 9.78 (08-12-23 @ 11:25)  MRSA PCR Result.: Negative (08-12-23 @ 06:10)  Rapid RVP Result: NotDetec (08-12-23 @ 01:33)  Procalcitonin, Serum: 0.63 (08-10-23 @ 08:46)  Procalcitonin, Serum: 7.82 (08-04-23 @ 16:13)  MRSA PCR Result.: Negative (08-04-23 @ 14:52)  Rapid RVP Result: NotDetec (08-04-23 @ 03:00)      INFLAMMATORY MARKERS  Sedimentation Rate, Erythrocyte: 100 mm/Hr (02-03-24 @ 11:13)  C-Reactive Protein, Serum: 214.2 mg/L (02-03-24 @ 11:13)  C-Reactive Protein, Serum: 132.3 mg/L (02-01-24 @ 16:00)  Sedimentation Rate, Erythrocyte: 67 mm/Hr (10-15-23 @ 07:28)      RADIOLOGY & ADDITIONAL TESTS:  I have personally reviewed the last available Chest xray  CXR  Xray Chest 1 View- PORTABLE-Urgent:   ACC: [id number] EXAM:  XR CHEST PORTABLE URGENT 1V   ORDERED BY: WENDY ROCHA     PROCEDURE DATE:  02/22/2024          INTERPRETATION:  Clinical History / Reason for exam: Line placement    Comparison : Chest radiograph February 22, 2024.    Technique/Positioning: Single AP view of the chest.    Findings:    Support devices: Feeding tube likely coiled in the oropharynx with distal   tip overlying the heart, possibly within hiatal hernia    Cardiac/mediastinum/hilum: Unchanged    Lung parenchyma/Pleura: Left basilar opacity/effusion, unchanged. No   pneumothorax.    Skeleton/soft tissues: Unchanged    Impression:    Left basilar opacity/effusion, unchanged.    Feeding tube likely coiled in oropharynx with distal tip overlying the   heart, possiblywithin a hiatal hernia. Findings are unchanged from prior.    --- End of Report ---            ELLIOT LANDAU MD; Attending Radiologist  This document has been electronically signed. Feb 22 2024  9:48AM (02-22-24 @ 08:08)      CT  CT Abdomen and Pelvis w/ IV Cont:   ACC: [id number] EXAM:  CT ABDOMEN AND PELVIS IC   ORDERED BY: IOANA SIMMONS     PROCEDURE DATE:  02/21/2024          INTERPRETATION:  CLINICAL STATEMENT: Abscess    TECHNIQUE: Contiguous axial CT images were obtained from the lower chest   to the pubic symphysis following administration of intravenous contrast.    IV contrast documented in unlinked concurrent exam ( ).  Oral contrast   was not administered.  Reformatted images in the coronal and sagittal   planes were acquired.    COMPARISONCT: September 29, 2023    OTHER STUDIES USED FOR CORRELATION: None.      FINDINGS:    HEPATOBILIARY: Contracted gallbladder with cholelithiasis versus wall   calcifications. Enlarged liver measures 21 cm in craniocaudal dimension.    SPLEEN: Unremarkable.    PANCREAS: Unremarkable.    ADRENAL GLANDS: Unremarkable.    KIDNEYS: Symmetric renal enhancement bilaterally. No hydronephrosis.    ABDOMINOPELVIC NODES: No lymphadenopathy.    PELVIC ORGANS: Unremarkable.    PERITONEUM/MESENTERY/BOWEL: No bowel obstruction, ascites or   pneumoperitoneum. Small hiatal hernia.    BONES/SOFT TISSUES: Degenerative changes of the spine noted. Bilateral L5   pars defects with grade 2 anterolisthesis of L5 on S1.      IMPRESSION:  1.  No CT evidence of an acuteabdominopelvic pathology.    See separately dictated CT chest report for intrathoracic findings.    --- End of Report ---            PATRICIA CHAVEZ MD; Attending Radiologist  This document has been electronically signed. Feb 21 2024 10:16AM (02-21-24 @ 00:10)      CARDIOLOGY TESTING      MEDICATIONS  acetylcysteine 20%  Inhalation 4  albuterol/ipratropium for Nebulization 3  AQUAPHOR (petrolatum Ointment) 1  artificial  tears Solution 1  calcium carbonate   1250 mG (OsCal) 1  caspofungin IVPB   caspofungin IVPB 50  chlorhexidine 2% Cloths 1  enoxaparin Injectable 50  gabapentin 300  levETIRAcetam  IVPB 500  melatonin 3  meropenem  IVPB 1000  midodrine. 20  norepinephrine Infusion 0.05  pantoprazole  Injectable 40  polyethylene glycol 3350 17  raloxifene 60  saccharomyces boulardii 250  senna 2  sodium chloride 0.65% Nasal 2  sodium chloride 3%  Inhalation 4      WEIGHT  Weight (kg): 52.3 (01-21-24 @ 08:00)  Creatinine: <0.5 mg/dL (02-22-24 @ 06:53)      ANTIBIOTICS:  caspofungin IVPB 50 milliGRAM(s) IV Intermittent every 24 hours  caspofungin IVPB      meropenem  IVPB 1000 milliGRAM(s) IV Intermittent every 8 hours      All available historical records have been reviewed

## 2024-02-22 NOTE — PROGRESS NOTE ADULT - ASSESSMENT
58F w/ PMHx Down Syndrome, nonverbal at baseline, Hypothyroidism, Cerebral palsy and Seizure Disorders presents to the ED from nursing facility/group home ([facility]) presented to ED for respiratory distress and high grade fever. Patient found to be septic on admission. Admitted to SDU for management of acute respiratory distress 2/2 CAP/Aspiration PNA (20 Jan 2024 18:22)  While pt was on the floor she developed dyspnea after swallow evaluation, was spiking high grade fever, consulted by pulmonary/critical care and was upgraded back to SDU.    A/P  # Sepsis POA / Acute hypoxic resp failure / RSV bronchiolitis /  H/o Dysphagia/ suspected aspiration PNA /high grade fever   - CXR noted 2/20, stable left large opacities and small right opacity    - completed the course of  meropenem 15 days on 2/16, Meropenem was resumed 2/18 ( as per prior ID recommendations), pt is spiking high grade fever and wbc uptrending.   - Histo Ag negative, only positive blood cx is for coag -ve staph  - currently on HFNC 60L FiO2 80%  - hypotensive again 2/19 despite fluid boluses so started levophed on 2/19 - c/w   Midodrine  20 mg Q 8 hours   - MRSA negative   - BCx (1/20): Staph hominis -  contaminant repeat BCx have been NGTD  - procal downtrending 0.2--0.16  - bolused 250 LR 2/20, levophed requirement is 0.1  - CT chest with cavitation in [place], ID: same abx, obtain TB test    plan:  - c/w levophed and midodrine.   - full rvp, UA, Blood Cx, CXR, fungitell, pan CT for hidden infection source   - ID 2/20: Repeat fungitell, Repeat MRSA nares- if (-) then D/C vancomycin, Recommend CT CAP, PO levaquin 500mg daily x 7 days end 2/21. Restarted on Meropenem 2/18 given fever/pressors , s/p extensive course. (1/21-1/27), 2/1-2/16, restarted 2/18 , on pressors , critical illness. Fungitell started downtrending 1 day after Caspo started, doubt related, but at this point given critical illness would plan on completing a course. Continue caspofungin 50mg q24h IV , hx unclear elevated fungitell. No clear source, ( empiric 14 day course was 2/18) No risk factors for PCP or other invasive fungal infection . Continuing for now.  - Failed FEES, put NG tube for feedings and medications, pt'll likely need PEG/trach when stable, consulted by GI   - Aspiration precautions, order chest PT vest   - Scopolamine patch d/c on 2/14  - supplement oxygen, monitor pulse Ox, on high flow oxygen for weeks   - c/w duoneb  - pulmonary is following, recommendations noted  - aggressive chest PT with vest, aspiration precautions and suction     # Elevated Troponin , type 2 MI/  Sinus tachycardia  - c/w telemetry monitoring   - Repeat EKG: Normal sinus rhythm  - c/w  metoprolol  -  maintain fluid balance   - TTE 2/19 normal EF no DD or valve abnormalities    # Seizure Disorder  - c/w  Keppra   - seizure precautions  - keep Mg above 2.0     # H/o lower ext DVT  - therapeutic Lovenox recommended by ICU team  - Eliquis held     # Hypothyroidism  - Thyroid Stimulating Hormone, Serum: 0.51 uIU/mL (01.21.24 @ 06:34)  - check FT4     # Normocytic Anemia, anemia of chronic disease   - monitor H/H, keep Hb above 7.5   - h/H trending down   - no active bleeding noted ( pt is on full AC)   - get anemia work up     # Down Syndrome/  Cerebral Palsy  - supportive care  - prevent falls and aspiration   - failed speech and swallow, needs PEG   - on Raloxifene     # Full code  - conversation started with patient's representative and progress note faxed to start considering DNR/DNI. for now full code.   La Nena Vasquez [phone]          Pending (specify): f/u  sepsis work up and ID,  pulmonary toilet, chest PT vest,  frequent suction, , pt needs PEG if becomes stable, daily CXR ,     overall prognosis is very poor     58F w/ PMHx Down Syndrome, nonverbal at baseline, Hypothyroidism, Cerebral palsy and Seizure Disorders presents to the ED from nursing facility[facility]group [facility] ([facility]) presented to ED for respiratory distress and high grade fever. Patient found to be septic on admission. Admitted to SDU for management of acute respiratory distress 2/2 CAP/Aspiration PNA (20 Jan 2024 18:22)  While pt was on the floor she developed dyspnea after swallow evaluation, was spiking high grade fever, consulted by pulmonary/critical care and was upgraded back to SDU.    A/P  # Sepsis POA / Acute hypoxic resp failure / RSV bronchiolitis /  H/o Dysphagia/ suspected aspiration PNA /high grade fever   - CXR noted 2/20, stable left large opacities and small right opacity    - completed the course of  meropenem 15 days on 2/16, Meropenem was resumed 2/18 ( as per prior ID recommendations), pt is spiking high grade fever and wbc uptrending.   - Histo Ag negative, only positive blood cx is for coag -ve staph  - currently on HFNC 60L FiO2 80%  - hypotensive again 2/19 despite fluid boluses so started levophed on 2/19 - c/w   Midodrine  20 mg Q 8 hours   - MRSA negative   - BCx (1/20): Staph hominis -  contaminant repeat BCx have been NGTD  - procal downtrending 0.2--0.16  - bolused 250 LR 2/20, levophed requirement is 0.1  - CT chest with cavitation in [place], ID: same abx, obtain TB test    plan:  - c/w levophed and midodrine.   - full rvp, UA, Blood Cx, CXR, fungitell, pan CT for hidden infection source   - ID 2/21: - Repeat fungitell  - D/C Vanco   - D/C PO levaquin  - quantiferon gold  - Doubt TB  - Restarted on Meropenem 2/18 given fever/pressors , s/p extensive course. (1/21-1/27), 2/1-2/16, restarted 2/18 , on pressors , critical illness  - Fungitell started downtrending 1 day after Caspo started, doubt related, but at this point given critical illness would plan on completing a course  - Continue caspofungin 50mg q24h IV , hx unclear elevated fungitell. No clear source, ( empiric 14 day course was 2/18) No risk factors for PCP or other invasive fungal infection . Continuing for now  - Grave prognosis, GOC , aggressive care is futile  - Failed FEES, put NG tube for feedings and medications, pt'll likely need PEG/trach when stable, consulted by GI   - Aspiration precautions, order chest PT vest   - Scopolamine patch d/c on 2/14  - supplement oxygen, monitor pulse Ox, on high flow oxygen for weeks   - c/w duoneb  - pulmonary is following, recommendations noted  - aggressive chest PT with vest, aspiration precautions and suction     # Elevated Troponin , type 2 MI/  Sinus tachycardia  - c/w telemetry monitoring   - Repeat EKG: Normal sinus rhythm  - c/w  metoprolol  -  maintain fluid balance   - TTE 2/19 normal EF no DD or valve abnormalities    # Seizure Disorder  - c/w  Keppra   - seizure precautions  - keep Mg above 2.0     # H/o lower ext DVT  - therapeutic Lovenox recommended by ICU team  - Eliquis held     # Hypothyroidism  - Thyroid Stimulating Hormone, Serum: 0.51 uIU/mL (01.21.24 @ 06:34)  - check FT4     # Normocytic Anemia, anemia of chronic disease   - monitor H/H, keep Hb above 7.5   - h/H trending down   - no active bleeding noted ( pt is on full AC)   - get anemia work up     # Down Syndrome/  Cerebral Palsy  - supportive care  - prevent falls and aspiration   - failed speech and swallow, needs PEG   - on Raloxifene     # Full code  - conversation started with patient's representative and progress note faxed to start considering DNR/DNI. for now full code.   La Nena Vasquez [phone]          Pending (specify): f/u  sepsis work up and ID,  pulmonary toilet, chest PT vest,  frequent suction, , pt needs PEG if becomes stable, daily CXR ,     overall prognosis is very poor

## 2024-02-22 NOTE — PROGRESS NOTE ADULT - SUBJECTIVE AND OBJECTIVE BOX
MEDICINE ATTENDING PROGRESS NOTE  HPI:  58F w/ PMHx Down Syndrome, nonverbal at baseline, Hypothyroidism, Cerebral palsy and Seizure Disorders presents to the ED from nursing facility/group home ([facility]) presented to ED for respiratory distress and high grade fever. Patient found to be septic on admission. As per aide Janette at bedside, patient had been coughing and congested for 3x days. Denies fevers, chills, n/v/d or pain prior to admission. Patient is on a puree diet at baseline.    Vitals: Temp 104.5F (rectal), /74, , RR 24, SpO2 100% on BiPAP    Labs: Hgb 9.5 (previously 11.1), Platelet 422, INR 1.43, VBG Lactate 2.1    Imaging: CXR shows possible RML infiltrate    In the ED:  - s/p Cefepime 2g IV x1  - s/p Levaquin 750mg IV x1  - s/p 2L LR bolus    Admitted to SDU for management of acute respiratory distress 2/2 CAP/Aspiration PNA (20 Jan 2024 18:22)      Interval/Overnight Events      ROS  General: Denies fevers, chills, nightsweats, weight loss  HEENT: Denies headache, rhinorrhea, sore throat, eye pain  CV: Denies CP, palpitations  PULM: Denies SOB, cough  GI: Denies abdominal pain, diarrhea  : Denies dysuria, hematuria  MSK: Denies arthralgias  SKIN: Denies rash   NEURO: Denies paresthesias, weakness  PSYCH: Denies depression    MEDICATIONS  (STANDING):  acetylcysteine 20%  Inhalation 4 milliLiter(s) Inhalation every 4 hours  albuterol/ipratropium for Nebulization 3 milliLiter(s) Nebulizer every 4 hours  AQUAPHOR (petrolatum Ointment) 1 Application(s) Topical two times a day  artificial  tears Solution 1 Drop(s) Both EYES two times a day  calcium carbonate   1250 mG (OsCal) 1 Tablet(s) Oral two times a day  caspofungin IVPB      caspofungin IVPB 50 milliGRAM(s) IV Intermittent every 24 hours  chlorhexidine 2% Cloths 1 Application(s) Topical daily  enoxaparin Injectable 50 milliGRAM(s) SubCutaneous every 12 hours  gabapentin 300 milliGRAM(s) Oral every 12 hours  levETIRAcetam  IVPB 500 milliGRAM(s) IV Intermittent two times a day  melatonin 3 milliGRAM(s) Oral at bedtime  meropenem  IVPB 1000 milliGRAM(s) IV Intermittent every 8 hours  midodrine. 20 milliGRAM(s) Oral three times a day  norepinephrine Infusion 0.05 MICROgram(s)/kG/Min (4.9 mL/Hr) IV Continuous <Continuous>  pantoprazole  Injectable 40 milliGRAM(s) IV Push every 24 hours  polyethylene glycol 3350 17 Gram(s) Oral at bedtime  raloxifene 60 milliGRAM(s) Oral daily  saccharomyces boulardii 250 milliGRAM(s) Oral two times a day  senna 2 Tablet(s) Oral at bedtime  sodium chloride 0.65% Nasal 2 Spray(s) Both Nostrils three times a day  sodium chloride 3%  Inhalation 4 milliLiter(s) Inhalation every 4 hours    MEDICATIONS  (PRN):  acetaminophen     Tablet .. 650 milliGRAM(s) Oral every 6 hours PRN Temp greater or equal to 38C (100.4F), Mild Pain (1 - 3)  aluminum hydroxide/magnesium hydroxide/simethicone Suspension 30 milliLiter(s) Oral every 4 hours PRN Dyspepsia  ondansetron Injectable 4 milliGRAM(s) IV Push every 8 hours PRN Nausea and/or Vomiting    ANTIBIOTICS:  caspofungin IVPB 50 milliGRAM(s) IV Intermittent every 24 hours  caspofungin IVPB      meropenem  IVPB 1000 milliGRAM(s) IV Intermittent every 8 hours      VITALS:  T(F): 98.2, Max: 99.6 (02-21-24 @ 16:00)  HR: 104  BP: 98/55  RR: 20Vital Signs Last 24 Hrs  T(C): 36.8 (22 Feb 2024 07:42), Max: 37.6 (21 Feb 2024 16:00)  T(F): 98.2 (22 Feb 2024 07:42), Max: 99.6 (21 Feb 2024 16:00)  HR: 104 (22 Feb 2024 07:42) (66 - 104)  BP: 98/55 (22 Feb 2024 07:42) (84/48 - 111/54)  BP(mean): 70 (22 Feb 2024 07:42) (61 - 79)  RR: 20 (22 Feb 2024 07:42) (18 - 20)  SpO2: 95% (22 Feb 2024 07:42) (93% - 95%)    Parameters below as of 22 Feb 2024 07:42  Patient On (Oxygen Delivery Method): nasal cannula, high flow     I&O's Summary    21 Feb 2024 07:01  -  22 Feb 2024 07:00  --------------------------------------------------------  IN: 38.3 mL / OUT: 0 mL / NET: 38.3 mL      PHYSICAL EXAM:  Gen: NAD, resting in bed  HEENT: Normocephalic, atraumatic  Neck: supple, no lymphadenopathy  CV: Regular rate & regular rhythm  Lungs: CTABL no wheeze  Abdomen: Soft, NTND+ BS present  Ext: Warm, well perfused no CCE  Neuro: non focal, awake, CN II-XII intact   Skin: no rash, no erythema  Psych: no SI, HI, Hallucination     LABS:                        8.2    5.36  )-----------( 560      ( 22 Feb 2024 06:53 )             26.9     02-22    136  |  94<L>  |  10  ----------------------------<  108<H>  4.4   |  32  |  <0.5<L>    Ca    9.0      22 Feb 2024 06:53  Phos  4.0     02-22  Mg     2.4     02-22    TPro  6.4  /  Alb  2.7<L>  /  TBili  <0.2  /  DBili  x   /  AST  25  /  ALT  26  /  AlkPhos  92  02-22      LIVER FUNCTIONS - ( 22 Feb 2024 06:53 )  Alb: 2.7 g/dL / Pro: 6.4 g/dL / ALK PHOS: 92 U/L / ALT: 26 U/L / AST: 25 U/L / GGT: x               MICROBIOLOGY:  Urinalysis Basic - ( 22 Feb 2024 06:53 )    Color: x / Appearance: x / SG: x / pH: x  Gluc: 108 mg/dL / Ketone: x  / Bili: x / Urobili: x   Blood: x / Protein: x / Nitrite: x   Leuk Esterase: x / RBC: x / WBC x   Sq Epi: x / Non Sq Epi: x / Bacteria: x        Culture - Blood (collected 02-19-24 @ 11:23)  Source: .Blood None  Preliminary Report (02-21-24 @ 20:01):    No growth at 48 Hours    Culture - Blood (collected 02-17-24 @ 20:41)  Source: .Blood None  Preliminary Report (02-22-24 @ 06:01):    No growth at 4 days      SARS-CoV-2: NotDetec (17 Feb 2024 19:06)  SARS-CoV-2: NotDetec (12 Feb 2024 10:28)  SARS-CoV-2: NotDetec (04 Feb 2024 10:28)  SARS-CoV-2: NotDetec (21 Jan 2024 00:58)  COVID-19 PCR: NotDetec (12 Oct 2023 09:14)      MRSA PCR Result.: Negative (02-20-24 @ 13:42)  Fungitell: 76 pg/mL (02-19-24 @ 16:00)  MRSA PCR Result.: Negative (02-15-24 @ 06:20)  MRSA PCR Result.: Negative (02-12-24 @ 10:28)  Fungitell: 63 pg/mL (02-06-24 @ 11:27)  Fungitell: 65 pg/mL (02-06-24 @ 04:43)  MRSA PCR Result.: Negative (02-03-24 @ 21:32)  MRSA PCR Result.: Negative (01-22-24 @ 05:30)  Legionella Antigen, Urine: Negative (01-21-24 @ 05:00)  Fungitell: 325 pg/mL (01-20-24 @ 21:23)  Fungitell: 138 pg/mL (11-07-23 @ 08:08)  Fungitell: 115 pg/mL (11-02-23 @ 11:40)  MRSA PCR Result.: Negative (10-17-23 @ 17:20)  Fungitell: >500 pg/mL (10-17-23 @ 17:20)  Legionella Antigen, Urine: Negative (09-30-23 @ 14:36)  MRSA PCR Result.: Negative (09-29-23 @ 16:50)  MRSA PCR Result.: Negative (08-12-23 @ 06:10)  MRSA PCR Result.: Negative (08-04-23 @ 14:52)      IMAGING:  CXR  Xray Chest 1 View- PORTABLE-Urgent:   ACC: [id number] EXAM:  XR CHEST PORTABLE URGENT 1V   ORDERED BY: WENDY ROCHA     PROCEDURE DATE:  02/22/2024          INTERPRETATION:  Clinical History / Reason for exam: Enteric catheter   evaluation.    Comparison : Chest radiograph 2/21/2024.    Technique/Positioning: Frontal portable.    Findings:    Support devices: Tip of the enteric catheter is projecting over the heart   possibly within the hiatal hernia. Part of it is coiled up in the   oropharynx. Telemetry leads    Cardiac/mediastinum/hilum: Unremarkable.    Lung parenchyma/Pleura: Retrocardiac pneumonia    Skeleton/soft tissues: Unremarkable.    Impression:    Retrocardiac pneumonia is similar in appearance.    Enteric catheter as above. It is subsequently repositioned.      --- End of Report ---            VARUN GAGE MD; Attending Radiologist  This document has been electronically signed. Feb 22 2024  7:31AM (02-22-24 @ 06:55)      CT  CT Abdomen and Pelvis w/ IV Cont:   ACC: [id number] EXAM:  CT ABDOMEN AND PELVIS IC   ORDERED BY: IOANA SIMMONS     PROCEDURE DATE:  02/21/2024          INTERPRETATION:  CLINICAL STATEMENT: Abscess    TECHNIQUE: Contiguous axial CT images were obtained from the lower chest   to the pubic symphysis following administration of intravenous contrast.    IV contrast documented in unlinked concurrent exam ( ).  Oral contrast   was not administered.  Reformatted images in the coronal and sagittal   planes were acquired.    COMPARISONCT: September 29, 2023    OTHER STUDIES USED FOR CORRELATION: None.      FINDINGS:    HEPATOBILIARY: Contracted gallbladder with cholelithiasis versus wall   calcifications. Enlarged liver measures 21 cm in craniocaudal dimension.    SPLEEN: Unremarkable.    PANCREAS: Unremarkable.    ADRENAL GLANDS: Unremarkable.    KIDNEYS: Symmetric renal enhancement bilaterally. No hydronephrosis.    ABDOMINOPELVIC NODES: No lymphadenopathy.    PELVIC ORGANS: Unremarkable.    PERITONEUM/MESENTERY/BOWEL: No bowel obstruction, ascites or   pneumoperitoneum. Small hiatal hernia.    BONES/SOFT TISSUES: Degenerative changes of the spine noted. Bilateral L5   pars defects with grade 2 anterolisthesis of L5 on S1.      IMPRESSION:  1.  No CT evidence of an acuteabdominopelvic pathology.    See separately dictated CT chest report for intrathoracic findings.    --- End of Report ---            PATRICIA CHAVEZ MD; Attending Radiologist  This document has been electronically signed. Feb 21 2024 10:16AM (02-21-24 @ 00:10)  CT Chest w/ IV Cont:   ACC: [id number] EXAM:  CT CHEST IC   ORDERED BY: IOANA SIMMONS     PROCEDURE DATE:  02/21/2024          INTERPRETATION:  Fever.    Technique: 95 cc administered of Omnipaque 350 (5 cc discarded).    Multiple transaxial images of the thorax were obtained.    Coronal and sagittal reformatted images were performed to better evaluate   anatomic relationships and for possible preoperative planning.    Comparison: CT thorax October 18, 2013.    Findings:  Tubes/lines:The patient has an enteric catheter with the catheter tip   residing above the hemidiaphragm. This should be advanced.    Central airways/ Lung parenchyma/ pleura :Diffuse opacification of the   left lung is seen with what appears to be some mucus plugging centrally   within the left mainstem bronchus. There is a pleural effusion.    Thick walled cavitation within the right upper lung field, measuring   nearly 3 cm.    Groundglass opacities throughout the right lung with a right lower lobe   opacification and effusion.    Chest wall/axilla: unremarkable    Mediastinum/Great vessels:Heart size is top normal. Reactive mediastinal   lymph nodes are seen.      Upper abdomen:Please see concurrent CT scan.    Osseous structures:No acute fracture or malalignment.      Impression:    Bilateral pneumonia, left worse than right, with a left upper lobe   thick-walled cavity.    Enteric catheter as described. Note that by the time that this report was   dictated, this issue had been tended to..    --- End of Report ---            FAYE JOHNSON MD; Attending Interventional Radiologist  This document has been electronically signed. Feb 21 2024  8:53AM (02-21-24 @ 00:09)    CARDIOLOGY TESTING  QRS axis to [] ° and NSR at a rate of [] BPM. There was no atrial enlargement. There was no ventricular hypertrophy. There were no ST-T changes and all intervals were normal.        ASSESSMENT/PLAN:  58F w/ PMHx Down Syndrome, nonverbal at baseline, Hypothyroidism, Cerebral palsy and Seizure Disorders [facility] the ED [facility] presented to ED for respiratory distress and high grade fever. Patient found to be septic on admission. Admitted to SDU for management of acute respiratory distress 2/2 CAP/Aspiration PNA (20 Jan 2024 18:22)  While pt was on the floor she developed dyspnea after swallow evaluation, was spiking high grade fever, consulted by pulmonary/critical care and was upgraded back to SDU.    A/P  # Sepsis POA / Acute hypoxic resp failure / RSV bronchiolitis /  H/o Dysphagia/ suspected aspiration PNA /high grade fever   - CXR noted 2/20, stable left large opacities and small right opacity    - completed the course of  meropenem 15 days on 2/16, Meropenem was resumed 2/18 ( as per prior ID recommendations), pt is spiking high grade fever and wbc uptrending.   - Histo Ag negative, only positive blood cx is for coag -ve staph  - currently on HFNC 60L FiO2 80%  - hypotensive again 2/19 despite fluid boluses so started levophed on 2/19 - c/w   Midodrine  20 mg Q 8 hours   - MRSA negative   - BCx (1/20): Staph hominis -  contaminant repeat BCx have been NGTD  - procal downtrending 0.2--0.16  - bolused 250 LR 2/20, levophed requirement is 0.1  - CT chest with cavitation in [place], ID: same abx, obtain TB test    plan:  - c/w levophed and midodrine.   - full rvp, UA, Blood Cx, CXR, fungitell, pan CT for hidden infection source   - ID 2/20: Repeat fungitell, Repeat MRSA nares- if (-) then D/C vancomycin, Recommend CT CAP, PO levaquin 500mg daily x 7 days end 2/21. Restarted on Meropenem 2/18 given fever/pressors , s/p extensive course. (1/21-1/27), 2/1-2/16, restarted 2/18 , on pressors , critical illness. Fungitell started downtrending 1 day after Caspo started, doubt related, but at this point given critical illness would plan on completing a course. Continue caspofungin 50mg q24h IV , hx unclear elevated fungitell. No clear source, ( empiric 14 day course was 2/18) No risk factors for PCP or other invasive fungal infection . Continuing for now.  - Failed FEES, put NG tube for feedings and medications, pt'll likely need PEG/trach when stable, consulted by GI   - Aspiration precautions, order chest PT vest   - Scopolamine patch d/c on 2/14  - supplement oxygen, monitor pulse Ox, on high flow oxygen for weeks   - c/w duoneb  - pulmonary is following, recommendations noted  - aggressive chest PT with vest, aspiration precautions and suction     # Elevated Troponin , type 2 MI/  Sinus tachycardia  - c/w telemetry monitoring   - Repeat EKG: Normal sinus rhythm  - c/w  metoprolol  -  maintain fluid balance   - TTE 2/19 normal EF no DD or valve abnormalities    # Seizure Disorder  - c/w  Keppra   - seizure precautions  - keep Mg above 2.0     # H/o lower ext DVT  - therapeutic Lovenox recommended by ICU team  - Eliquis held     # Hypothyroidism  - Thyroid Stimulating Hormone, Serum: 0.51 uIU/mL (01.21.24 @ 06:34)  - check FT4     # Normocytic Anemia, anemia of chronic disease   - monitor H/H, keep Hb above 7.5   - h/H trending down   - no active bleeding noted ( pt is on full AC)   - get anemia work up     # Down Syndrome/  Cerebral Palsy  - supportive care  - prevent falls and aspiration   - failed speech and swallow, needs PEG   - on Raloxifene   #Supportive Management:  Dispo:   DVT Ppx: enoxaparin Injectable 50 milliGRAM(s) SubCutaneous every 12 hours  GI Ppx: pantoprazole  Injectable 40 milliGRAM(s) IV Push every 24 hours  Diet:  Diet, NPO with Tube Feed:   Tube Feeding Modality: Nasogastric  Jevity 1.2 Juventino  Total Volume for 24 Hours (mL): 1200  Bolus  Total Volume of Bolus (mL):  300  Tube Feed Frequency: Every 6 hours   Tube Feed Start Time: 00:00   Bolus Feed Duration (in Hours): 1.5  Free Water Flush  Bolus   Total Volume per Flush (mL): 250   Frequency: Every 6 Hours  Free Water Flush Instructions:  75mL water flush pre and then post feeds. Keep HOB >45 degress during feeds  No Carb Prosource TF     Qty per Day:  1 (02-12-24 @ 08:30) [Active]      Total time spent to complete patient's bedside assessment, review medical chart, discuss medical plan of care with covering medical team was more than 45 minutes with >50% of time spent face to face with patient, discussion with patient/family and/or coordination of care    Housestaff's notes reviewed. When there is confusion regarding the content or information provided in the notes of a housestaff (resident, medical student, physician assistant, or nurse practioner) and the attending physician notes, the attending physician's note takes precedence and supersedes the other notes.    Missael Montanez MD/Janet  Attending Physician MEDICINE ATTENDING PROGRESS NOTE  58F w/ PMHx Down Syndrome, nonverbal at baseline, Hypothyroidism, Cerebral palsy and Seizure Disorders presents to the ED from nursing facility/group home ([facility]) presented to ED for respiratory distress and high grade fever. Patient found to be septic on admission. Admitted to SDU for management of acute respiratory distress 2/2 CAP/Aspiration PNA (20 Jan 2024 18:22)  While pt was on the floor she developed dyspnea after swallow evaluation, was spiking high grade fever, consulted by pulmonary/critical care and was upgraded back to SDU.    Interval/Overnight Events  No acute complaints  Will dc TB isolation; ID and Pulm do not think it's TB  pending Quantiferon    ROS  General: Denies fevers, chills, nightsweats, weight loss  HEENT: Denies headache, rhinorrhea, sore throat, eye pain  CV: Denies CP, palpitations  PULM: Denies SOB, cough  GI: Denies abdominal pain, diarrhea  : Denies dysuria, hematuria  MSK: Denies arthralgias  SKIN: Denies rash   NEURO: Denies paresthesias, weakness  PSYCH: Denies depression    MEDICATIONS  (STANDING):  acetylcysteine 20%  Inhalation 4 milliLiter(s) Inhalation every 4 hours  albuterol/ipratropium for Nebulization 3 milliLiter(s) Nebulizer every 4 hours  AQUAPHOR (petrolatum Ointment) 1 Application(s) Topical two times a day  artificial  tears Solution 1 Drop(s) Both EYES two times a day  calcium carbonate   1250 mG (OsCal) 1 Tablet(s) Oral two times a day  caspofungin IVPB      caspofungin IVPB 50 milliGRAM(s) IV Intermittent every 24 hours  chlorhexidine 2% Cloths 1 Application(s) Topical daily  enoxaparin Injectable 50 milliGRAM(s) SubCutaneous every 12 hours  gabapentin 300 milliGRAM(s) Oral every 12 hours  levETIRAcetam  IVPB 500 milliGRAM(s) IV Intermittent two times a day  melatonin 3 milliGRAM(s) Oral at bedtime  meropenem  IVPB 1000 milliGRAM(s) IV Intermittent every 8 hours  midodrine. 20 milliGRAM(s) Oral three times a day  norepinephrine Infusion 0.05 MICROgram(s)/kG/Min (4.9 mL/Hr) IV Continuous <Continuous>  pantoprazole  Injectable 40 milliGRAM(s) IV Push every 24 hours  polyethylene glycol 3350 17 Gram(s) Oral at bedtime  raloxifene 60 milliGRAM(s) Oral daily  saccharomyces boulardii 250 milliGRAM(s) Oral two times a day  senna 2 Tablet(s) Oral at bedtime  sodium chloride 0.65% Nasal 2 Spray(s) Both Nostrils three times a day  sodium chloride 3%  Inhalation 4 milliLiter(s) Inhalation every 4 hours    MEDICATIONS  (PRN):  acetaminophen     Tablet .. 650 milliGRAM(s) Oral every 6 hours PRN Temp greater or equal to 38C (100.4F), Mild Pain (1 - 3)  aluminum hydroxide/magnesium hydroxide/simethicone Suspension 30 milliLiter(s) Oral every 4 hours PRN Dyspepsia  ondansetron Injectable 4 milliGRAM(s) IV Push every 8 hours PRN Nausea and/or Vomiting    ANTIBIOTICS:  caspofungin IVPB 50 milliGRAM(s) IV Intermittent every 24 hours  caspofungin IVPB      meropenem  IVPB 1000 milliGRAM(s) IV Intermittent every 8 hours      VITALS:  T(F): 98.2, Max: 99.6 (02-21-24 @ 16:00)  HR: 104  BP: 98/55  RR: 20Vital Signs Last 24 Hrs  T(C): 36.8 (22 Feb 2024 07:42), Max: 37.6 (21 Feb 2024 16:00)  T(F): 98.2 (22 Feb 2024 07:42), Max: 99.6 (21 Feb 2024 16:00)  HR: 104 (22 Feb 2024 07:42) (66 - 104)  BP: 98/55 (22 Feb 2024 07:42) (84/48 - 111/54)  BP(mean): 70 (22 Feb 2024 07:42) (61 - 79)  RR: 20 (22 Feb 2024 07:42) (18 - 20)  SpO2: 95% (22 Feb 2024 07:42) (93% - 95%)    Parameters below as of 22 Feb 2024 07:42  Patient On (Oxygen Delivery Method): nasal cannula, high flow     I&O's Summary    21 Feb 2024 07:01  -  22 Feb 2024 07:00  --------------------------------------------------------  IN: 38.3 mL / OUT: 0 mL / NET: 38.3 mL      PHYSICAL EXAM:  Gen: NAD, resting in bed  HEENT: Normocephalic, atraumatic  Neck: supple, no lymphadenopathy  CV: Regular rate & regular rhythm  Lungs: CTABL no wheeze  Abdomen: Soft, NTND+ BS present  Ext: Warm, well perfused no CCE  Neuro: non focal, awake, CN II-XII intact   Skin: no rash, no erythema  Psych: no SI, HI, Hallucination     LABS:                        8.2    5.36  )-----------( 560      ( 22 Feb 2024 06:53 )             26.9     02-22    136  |  94<L>  |  10  ----------------------------<  108<H>  4.4   |  32  |  <0.5<L>    Ca    9.0      22 Feb 2024 06:53  Phos  4.0     02-22  Mg     2.4     02-22    TPro  6.4  /  Alb  2.7<L>  /  TBili  <0.2  /  DBili  x   /  AST  25  /  ALT  26  /  AlkPhos  92  02-22      LIVER FUNCTIONS - ( 22 Feb 2024 06:53 )  Alb: 2.7 g/dL / Pro: 6.4 g/dL / ALK PHOS: 92 U/L / ALT: 26 U/L / AST: 25 U/L / GGT: x               MICROBIOLOGY:  Urinalysis Basic - ( 22 Feb 2024 06:53 )    Color: x / Appearance: x / SG: x / pH: x  Gluc: 108 mg/dL / Ketone: x  / Bili: x / Urobili: x   Blood: x / Protein: x / Nitrite: x   Leuk Esterase: x / RBC: x / WBC x   Sq Epi: x / Non Sq Epi: x / Bacteria: x        Culture - Blood (collected 02-19-24 @ 11:23)  Source: .Blood None  Preliminary Report (02-21-24 @ 20:01):    No growth at 48 Hours    Culture - Blood (collected 02-17-24 @ 20:41)  Source: .Blood None  Preliminary Report (02-22-24 @ 06:01):    No growth at 4 days      SARS-CoV-2: NotDetec (17 Feb 2024 19:06)  SARS-CoV-2: NotDetec (12 Feb 2024 10:28)  SARS-CoV-2: NotDetec (04 Feb 2024 10:28)  SARS-CoV-2: NotDetec (21 Jan 2024 00:58)  COVID-19 PCR: NotDetec (12 Oct 2023 09:14)      MRSA PCR Result.: Negative (02-20-24 @ 13:42)  Fungitell: 76 pg/mL (02-19-24 @ 16:00)  MRSA PCR Result.: Negative (02-15-24 @ 06:20)  MRSA PCR Result.: Negative (02-12-24 @ 10:28)  Fungitell: 63 pg/mL (02-06-24 @ 11:27)  Fungitell: 65 pg/mL (02-06-24 @ 04:43)  MRSA PCR Result.: Negative (02-03-24 @ 21:32)  MRSA PCR Result.: Negative (01-22-24 @ 05:30)  Legionella Antigen, Urine: Negative (01-21-24 @ 05:00)  Fungitell: 325 pg/mL (01-20-24 @ 21:23)  Fungitell: 138 pg/mL (11-07-23 @ 08:08)  Fungitell: 115 pg/mL (11-02-23 @ 11:40)  MRSA PCR Result.: Negative (10-17-23 @ 17:20)  Fungitell: >500 pg/mL (10-17-23 @ 17:20)  Legionella Antigen, Urine: Negative (09-30-23 @ 14:36)  MRSA PCR Result.: Negative (09-29-23 @ 16:50)  MRSA PCR Result.: Negative (08-12-23 @ 06:10)  MRSA PCR Result.: Negative (08-04-23 @ 14:52)      IMAGING:  CXR  Xray Chest 1 View- PORTABLE-Urgent:   ACC: [id number] EXAM:  XR CHEST PORTABLE URGENT 1V   ORDERED BY: WENDY ROCHA     PROCEDURE DATE:  02/22/2024          INTERPRETATION:  Clinical History / Reason for exam: Enteric catheter   evaluation.    Comparison : Chest radiograph 2/21/2024.    Technique/Positioning: Frontal portable.    Findings:    Support devices: Tip of the enteric catheter is projecting over the heart   possibly within the hiatal hernia. Part of it is coiled up in the   oropharynx. Telemetry leads    Cardiac/mediastinum/hilum: Unremarkable.    Lung parenchyma/Pleura: Retrocardiac pneumonia    Skeleton/soft tissues: Unremarkable.    Impression:    Retrocardiac pneumonia is similar in appearance.    Enteric catheter as above. It is subsequently repositioned.      --- End of Report ---            VARUN GAGE MD; Attending Radiologist  This document has been electronically signed. Feb 22 2024  7:31AM (02-22-24 @ 06:55)      CT  CT Abdomen and Pelvis w/ IV Cont:   ACC: [id number] EXAM:  CT ABDOMEN AND PELVIS IC   ORDERED BY: IOANA SIMMONS     PROCEDURE DATE:  02/21/2024          INTERPRETATION:  CLINICAL STATEMENT: Abscess    TECHNIQUE: Contiguous axial CT images were obtained from the lower chest   to the pubic symphysis following administration of intravenous contrast.    IV contrast documented in unlinked concurrent exam ( ).  Oral contrast   was not administered.  Reformatted images in the coronal and sagittal   planes were acquired.    COMPARISONCT: September 29, 2023    OTHER STUDIES USED FOR CORRELATION: None.      FINDINGS:    HEPATOBILIARY: Contracted gallbladder with cholelithiasis versus wall   calcifications. Enlarged liver measures 21 cm in craniocaudal dimension.    SPLEEN: Unremarkable.    PANCREAS: Unremarkable.    ADRENAL GLANDS: Unremarkable.    KIDNEYS: Symmetric renal enhancement bilaterally. No hydronephrosis.    ABDOMINOPELVIC NODES: No lymphadenopathy.    PELVIC ORGANS: Unremarkable.    PERITONEUM/MESENTERY/BOWEL: No bowel obstruction, ascites or   pneumoperitoneum. Small hiatal hernia.    BONES/SOFT TISSUES: Degenerative changes of the spine noted. Bilateral L5   pars defects with grade 2 anterolisthesis of L5 on S1.      IMPRESSION:  1.  No CT evidence of an acuteabdominopelvic pathology.    See separately dictated CT chest report for intrathoracic findings.    --- End of Report ---            PATRICIA CHAVEZ MD; Attending Radiologist  This document has been electronically signed. Feb 21 2024 10:16AM (02-21-24 @ 00:10)  CT Chest w/ IV Cont:   ACC: [id number] EXAM:  CT CHEST IC   ORDERED BY: IOANA SIMMONS     PROCEDURE DATE:  02/21/2024          INTERPRETATION:  Fever.    Technique: 95 cc administered of Omnipaque 350 (5 cc discarded).    Multiple transaxial images of the thorax were obtained.    Coronal and sagittal reformatted images were performed to better evaluate   anatomic relationships and for possible preoperative planning.    Comparison: CT thorax October 18, 2013.    Findings:  Tubes/lines:The patient has an enteric catheter with the catheter tip   residing above the hemidiaphragm. This should be advanced.    Central airways/ Lung parenchyma/ pleura :Diffuse opacification of the   left lung is seen with what appears to be some mucus plugging centrally   within the left mainstem bronchus. There is a pleural effusion.    Thick walled cavitation within the right upper lung field, measuring   nearly 3 cm.    Groundglass opacities throughout the right lung with a right lower lobe   opacification and effusion.    Chest wall/axilla: unremarkable    Mediastinum/Great vessels:Heart size is top normal. Reactive mediastinal   lymph nodes are seen.      Upper abdomen:Please see concurrent CT scan.    Osseous structures:No acute fracture or malalignment.      Impression:    Bilateral pneumonia, left worse than right, with a left upper lobe   thick-walled cavity.    Enteric catheter as described. Note that by the time that this report was   dictated, this issue had been tended to..    --- End of Report ---            FAYE JOHNSON MD; Attending Interventional Radiologist  This document has been electronically signed. Feb 21 2024  8:53AM (02-21-24 @ 00:09)    CARDIOLOGY TESTING  QRS axis to [] ° and NSR at a rate of [] BPM. There was no atrial enlargement. There was no ventricular hypertrophy. There were no ST-T changes and all intervals were normal.        ASSESSMENT/PLAN:  58F w/ PMHx Down Syndrome, nonverbal at baseline, Hypothyroidism, Cerebral palsy and Seizure Disorders presents to the ED from nursing facility/group home ([facility]) presented to ED for respiratory distress and high grade fever. Patient found to be septic on admission. Admitted to SDU for management of acute respiratory distress 2/2 CAP/Aspiration PNA (20 Jan 2024 18:22)  While pt was on the floor she developed dyspnea after swallow evaluation, was spiking high grade fever, consulted by pulmonary/critical care and was upgraded back to SDU.    # Sepsis POA / Acute hypoxic resp failure / RSV bronchiolitis /  H/o Dysphagia/ suspected aspiration PNA /high grade fever   - CXR noted 2/20, stable left large opacities and small right opacity    - completed the course of  meropenem 15 days on 2/16, Meropenem was resumed 2/18 ( as per prior ID recommendations), pt is spiking high grade fever and wbc uptrending.   - Histo Ag negative, only positive blood cx is for coag -ve staph  - currently on HFNC 60L FiO2 80%  - hypotensive again 2/19 despite fluid boluses so started levophed on 2/19 - c/w   Midodrine  20 mg Q 8 hours   - MRSA negative   - BCx (1/20): Staph hominis -  contaminant repeat BCx have been NGTD  - procal downtrending 0.2--0.16  - bolused 250 LR 2/20, levophed requirement is 0.1  - CT chest with cavitation in [place], ID: same abx, obtain TB test    plan:  - c/w levophed and midodrine.   - full rvp, UA, Blood Cx, CXR, fungitell, pan CT for hidden infection source   - ID 2/20: Repeat fungitell, Repeat MRSA nares- if (-) then D/C vancomycin, Recommend CT CAP, PO levaquin 500mg daily x 7 days end 2/21. Restarted on Meropenem 2/18 given fever/pressors , s/p extensive course. (1/21-1/27), 2/1-2/16, restarted 2/18 , on pressors , critical illness. Fungitell started downtrending 1 day after Caspo started, doubt related, but at this point given critical illness would plan on completing a course. Continue caspofungin 50mg q24h IV , hx unclear elevated fungitell. No clear source, ( empiric 14 day course was 2/18) No risk factors for PCP or other invasive fungal infection . Continuing for now.  - Failed FEES, put NG tube for feedings and medications, pt'll likely need PEG/trach when stable, consulted by GI   - Aspiration precautions, order chest PT vest   - Scopolamine patch d/c on 2/14  - supplement oxygen, monitor pulse Ox, on high flow oxygen for weeks   - c/w duoneb  - pulmonary is following, recommendations noted  - aggressive chest PT with vest, aspiration precautions and suction     # Elevated Troponin , type 2 MI/  Sinus tachycardia  - c/w telemetry monitoring   - Repeat EKG: Normal sinus rhythm  - c/w  metoprolol  -  maintain fluid balance   - TTE 2/19 normal EF no DD or valve abnormalities    # Seizure Disorder  - c/w  Keppra   - seizure precautions  - keep Mg above 2.0     # H/o lower ext DVT  - therapeutic Lovenox recommended by ICU team  - Eliquis held     # Hypothyroidism  - Thyroid Stimulating Hormone, Serum: 0.51 uIU/mL (01.21.24 @ 06:34)    # Normocytic Anemia, anemia of chronic disease   - monitor H/H, keep Hb above 7.5   - h/H trending down   - no active bleeding noted ( pt is on full AC)   - get anemia work up     # Down Syndrome/  Cerebral Palsy  - supportive care  - prevent falls and aspiration   - failed speech and swallow, needs PEG   - on Raloxifene     #Supportive Management:  Dispo:   DVT Ppx: enoxaparin Injectable 50 milliGRAM(s) SubCutaneous every 12 hours  GI Ppx: pantoprazole  Injectable 40 milliGRAM(s) IV Push every 24 hours  Diet:  Diet, NPO with Tube Feed:   Tube Feeding Modality: Nasogastric  Jevity 1.2 Juventino  Total Volume for 24 Hours (mL): 1200  Bolus  Total Volume of Bolus (mL):  300  Tube Feed Frequency: Every 6 hours   Tube Feed Start Time: 00:00   Bolus Feed Duration (in Hours): 1.5  Free Water Flush  Bolus   Total Volume per Flush (mL): 250   Frequency: Every 6 Hours  Free Water Flush Instructions:  75mL water flush pre and then post feeds. Keep HOB >45 degress during feeds  No Carb Prosource TF     Qty per Day:  1 (02-12-24 @ 08:30) [Active]      Total time spent to complete patient's bedside assessment, review medical chart, discuss medical plan of care with covering medical team was more than 45 minutes with >50% of time spent face to face with patient, discussion with patient/family and/or coordination of care    Housestaff's notes reviewed. When there is confusion regarding the content or information provided in the notes of a housestaff (resident, medical student, physician assistant, or nurse practioner) and the attending physician notes, the attending physician's note takes precedence and supersedes the other notes.    Missael Montanez MD/Janet  Attending Physician

## 2024-02-22 NOTE — PROGRESS NOTE ADULT - SUBJECTIVE AND OBJECTIVE BOX
24H events:    Patient is a 58y old Female who presents with a chief complaint of Respiratory Distress (22 Feb 2024 10:39)  Primary diagnosis of Sepsis with acute hypoxic respiratory failure  Today is hospital day 33d. This morning patient was seen and examined at bedside, resting comfortably in bed.    No acute or major events overnight. Hemodynamically stable, tolerating oral diet, voiding appropriately with appropriate bowel movements.     multiple attempts and NT tube placements, complicated due to hiatal hernia, f/u regarding long term plan possible peg  possible ENT f/u but unclear if it will help given that NGT coils at location of hernia     Code Status:    Family communication:  Contact date:  Name of person contacted:  Relationship to patient:  Communication details:  What matters most:    PAST MEDICAL & SURGICAL HISTORY  Down syndrome    Osteoporosis    Mild anemia    Neuropathy    S/P debridement  of R hip on 3/2/21      SOCIAL HISTORY:  Social History:      ALLERGIES:  No Known Allergies    MEDICATIONS:  STANDING MEDICATIONS  acetylcysteine 20%  Inhalation 4 milliLiter(s) Inhalation every 4 hours  albuterol/ipratropium for Nebulization 3 milliLiter(s) Nebulizer every 4 hours  AQUAPHOR (petrolatum Ointment) 1 Application(s) Topical two times a day  artificial  tears Solution 1 Drop(s) Both EYES two times a day  calcium carbonate   1250 mG (OsCal) 1 Tablet(s) Oral two times a day  caspofungin IVPB      caspofungin IVPB 50 milliGRAM(s) IV Intermittent every 24 hours  chlorhexidine 2% Cloths 1 Application(s) Topical daily  enoxaparin Injectable 50 milliGRAM(s) SubCutaneous every 12 hours  gabapentin 300 milliGRAM(s) Oral every 12 hours  levETIRAcetam  IVPB 500 milliGRAM(s) IV Intermittent two times a day  melatonin 3 milliGRAM(s) Oral at bedtime  meropenem  IVPB 1000 milliGRAM(s) IV Intermittent every 8 hours  midodrine. 20 milliGRAM(s) Oral three times a day  norepinephrine Infusion 0.05 MICROgram(s)/kG/Min IV Continuous <Continuous>  pantoprazole  Injectable 40 milliGRAM(s) IV Push every 24 hours  polyethylene glycol 3350 17 Gram(s) Oral at bedtime  raloxifene 60 milliGRAM(s) Oral daily  saccharomyces boulardii 250 milliGRAM(s) Oral two times a day  senna 2 Tablet(s) Oral at bedtime  sodium chloride 0.65% Nasal 2 Spray(s) Both Nostrils three times a day  sodium chloride 3%  Inhalation 4 milliLiter(s) Inhalation every 4 hours    PRN MEDICATIONS  acetaminophen     Tablet .. 650 milliGRAM(s) Oral every 6 hours PRN  aluminum hydroxide/magnesium hydroxide/simethicone Suspension 30 milliLiter(s) Oral every 4 hours PRN  ondansetron Injectable 4 milliGRAM(s) IV Push every 8 hours PRN    VITALS:   T(F): 97.9  HR: 106  BP: 137/76  RR: 20  SpO2: 93%    PHYSICAL EXAM:  Gen: NAD, resting in bed  HEENT: Normocephalic, atraumatic  Neck: supple, no lymphadenopathy  CV: Regular rate & regular rhythm  Lungs: CTABL no wheeze  Abdomen: Soft, NTND+ BS present, has NGT   Ext: Warm, well perfused no CCE  Neuro: non focal, awake, does not follow commands  Skin: no rash, no erythema        (  ) Indwelling Madsen Catheter:   Date insterted:    Reason (  ) Critical illness     (  ) urinary retention    (  ) Accurate Ins/Outs Monitoring     (  ) CMO patient    (  ) Central Line:   Date inserted:  Location: (  ) Right IJ     (  ) Left IJ     (  ) Right Fem     (  ) Left Fem    (  ) SPC        (  ) pigtail       (  ) PEG tube       (  ) colostomy       (  ) jejunostomy  (  ) U-Dall    LABS:                        8.2    5.36  )-----------( 560      ( 22 Feb 2024 06:53 )             26.9     02-22    136  |  94<L>  |  10  ----------------------------<  108<H>  4.4   |  32  |  <0.5<L>    Ca    9.0      22 Feb 2024 06:53  Phos  4.0     02-22  Mg     2.4     02-22    TPro  6.4  /  Alb  2.7<L>  /  TBili  <0.2  /  DBili  x   /  AST  25  /  ALT  26  /  AlkPhos  92  02-22      Urinalysis Basic - ( 22 Feb 2024 06:53 )    Color: x / Appearance: x / SG: x / pH: x  Gluc: 108 mg/dL / Ketone: x  / Bili: x / Urobili: x   Blood: x / Protein: x / Nitrite: x   Leuk Esterase: x / RBC: x / WBC x   Sq Epi: x / Non Sq Epi: x / Bacteria: x                RADIOLOGY:    RADIOLOGY

## 2024-02-23 LAB
ALBUMIN SERPL ELPH-MCNC: 2.7 G/DL — LOW (ref 3.5–5.2)
ALP SERPL-CCNC: 96 U/L — SIGNIFICANT CHANGE UP (ref 30–115)
ALT FLD-CCNC: 22 U/L — SIGNIFICANT CHANGE UP (ref 0–41)
ANION GAP SERPL CALC-SCNC: 12 MMOL/L — SIGNIFICANT CHANGE UP (ref 7–14)
AST SERPL-CCNC: 19 U/L — SIGNIFICANT CHANGE UP (ref 0–41)
BASOPHILS # BLD AUTO: 0.06 K/UL — SIGNIFICANT CHANGE UP (ref 0–0.2)
BASOPHILS NFR BLD AUTO: 0.8 % — SIGNIFICANT CHANGE UP (ref 0–1)
BILIRUB SERPL-MCNC: 0.2 MG/DL — SIGNIFICANT CHANGE UP (ref 0.2–1.2)
BUN SERPL-MCNC: 12 MG/DL — SIGNIFICANT CHANGE UP (ref 10–20)
CALCIUM SERPL-MCNC: 9 MG/DL — SIGNIFICANT CHANGE UP (ref 8.4–10.5)
CHLORIDE SERPL-SCNC: 95 MMOL/L — LOW (ref 98–110)
CO2 SERPL-SCNC: 30 MMOL/L — SIGNIFICANT CHANGE UP (ref 17–32)
CREAT SERPL-MCNC: 0.5 MG/DL — LOW (ref 0.7–1.5)
CULTURE RESULTS: SIGNIFICANT CHANGE UP
EGFR: 109 ML/MIN/1.73M2 — SIGNIFICANT CHANGE UP
EOSINOPHIL # BLD AUTO: 0.06 K/UL — SIGNIFICANT CHANGE UP (ref 0–0.7)
EOSINOPHIL NFR BLD AUTO: 0.8 % — SIGNIFICANT CHANGE UP (ref 0–8)
GLUCOSE BLDC GLUCOMTR-MCNC: 114 MG/DL — HIGH (ref 70–99)
GLUCOSE BLDC GLUCOMTR-MCNC: 144 MG/DL — HIGH (ref 70–99)
GLUCOSE BLDC GLUCOMTR-MCNC: 152 MG/DL — HIGH (ref 70–99)
GLUCOSE SERPL-MCNC: 115 MG/DL — HIGH (ref 70–99)
HCT VFR BLD CALC: 28.6 % — LOW (ref 37–47)
HGB BLD-MCNC: 8.7 G/DL — LOW (ref 12–16)
IMM GRANULOCYTES NFR BLD AUTO: 0.8 % — HIGH (ref 0.1–0.3)
LYMPHOCYTES # BLD AUTO: 1.21 K/UL — SIGNIFICANT CHANGE UP (ref 1.2–3.4)
LYMPHOCYTES # BLD AUTO: 15.5 % — LOW (ref 20.5–51.1)
MAGNESIUM SERPL-MCNC: 2.4 MG/DL — SIGNIFICANT CHANGE UP (ref 1.8–2.4)
MCHC RBC-ENTMCNC: 23.2 PG — LOW (ref 27–31)
MCHC RBC-ENTMCNC: 30.4 G/DL — LOW (ref 32–37)
MCV RBC AUTO: 76.3 FL — LOW (ref 81–99)
MONOCYTES # BLD AUTO: 0.49 K/UL — SIGNIFICANT CHANGE UP (ref 0.1–0.6)
MONOCYTES NFR BLD AUTO: 6.3 % — SIGNIFICANT CHANGE UP (ref 1.7–9.3)
NEUTROPHILS # BLD AUTO: 5.94 K/UL — SIGNIFICANT CHANGE UP (ref 1.4–6.5)
NEUTROPHILS NFR BLD AUTO: 75.8 % — HIGH (ref 42.2–75.2)
NRBC # BLD: 0 /100 WBCS — SIGNIFICANT CHANGE UP (ref 0–0)
PLATELET # BLD AUTO: 604 K/UL — HIGH (ref 130–400)
PMV BLD: 10 FL — SIGNIFICANT CHANGE UP (ref 7.4–10.4)
POTASSIUM SERPL-MCNC: 4.2 MMOL/L — SIGNIFICANT CHANGE UP (ref 3.5–5)
POTASSIUM SERPL-SCNC: 4.2 MMOL/L — SIGNIFICANT CHANGE UP (ref 3.5–5)
PROT SERPL-MCNC: 6.7 G/DL — SIGNIFICANT CHANGE UP (ref 6–8)
RBC # BLD: 3.75 M/UL — LOW (ref 4.2–5.4)
RBC # FLD: 21.2 % — HIGH (ref 11.5–14.5)
SODIUM SERPL-SCNC: 137 MMOL/L — SIGNIFICANT CHANGE UP (ref 135–146)
SPECIMEN SOURCE: SIGNIFICANT CHANGE UP
WBC # BLD: 7.82 K/UL — SIGNIFICANT CHANGE UP (ref 4.8–10.8)
WBC # FLD AUTO: 7.82 K/UL — SIGNIFICANT CHANGE UP (ref 4.8–10.8)

## 2024-02-23 PROCEDURE — 99232 SBSQ HOSP IP/OBS MODERATE 35: CPT

## 2024-02-23 PROCEDURE — 71045 X-RAY EXAM CHEST 1 VIEW: CPT | Mod: 26

## 2024-02-23 PROCEDURE — 93010 ELECTROCARDIOGRAM REPORT: CPT

## 2024-02-23 PROCEDURE — 99233 SBSQ HOSP IP/OBS HIGH 50: CPT

## 2024-02-23 PROCEDURE — 71045 X-RAY EXAM CHEST 1 VIEW: CPT | Mod: 26,77

## 2024-02-23 RX ORDER — SODIUM CHLORIDE 9 MG/ML
1000 INJECTION, SOLUTION INTRAVENOUS
Refills: 0 | Status: DISCONTINUED | OUTPATIENT
Start: 2024-02-23 | End: 2024-02-25

## 2024-02-23 RX ORDER — ACETAMINOPHEN 500 MG
1000 TABLET ORAL ONCE
Refills: 0 | Status: COMPLETED | OUTPATIENT
Start: 2024-02-23 | End: 2024-02-23

## 2024-02-23 RX ORDER — ACETYLCYSTEINE 200 MG/ML
4 VIAL (ML) MISCELLANEOUS EVERY 6 HOURS
Refills: 0 | Status: DISCONTINUED | OUTPATIENT
Start: 2024-02-23 | End: 2024-04-08

## 2024-02-23 RX ORDER — IPRATROPIUM/ALBUTEROL SULFATE 18-103MCG
3 AEROSOL WITH ADAPTER (GRAM) INHALATION EVERY 6 HOURS
Refills: 0 | Status: DISCONTINUED | OUTPATIENT
Start: 2024-02-23 | End: 2024-03-25

## 2024-02-23 RX ADMIN — Medication 1 DROP(S): at 06:54

## 2024-02-23 RX ADMIN — Medication 3 MILLILITER(S): at 19:36

## 2024-02-23 RX ADMIN — CASPOFUNGIN ACETATE 260 MILLIGRAM(S): 7 INJECTION, POWDER, LYOPHILIZED, FOR SOLUTION INTRAVENOUS at 10:25

## 2024-02-23 RX ADMIN — Medication 250 MILLIGRAM(S): at 18:23

## 2024-02-23 RX ADMIN — CHLORHEXIDINE GLUCONATE 1 APPLICATION(S): 213 SOLUTION TOPICAL at 11:46

## 2024-02-23 RX ADMIN — Medication 4 MILLILITER(S): at 08:56

## 2024-02-23 RX ADMIN — ENOXAPARIN SODIUM 50 MILLIGRAM(S): 100 INJECTION SUBCUTANEOUS at 18:19

## 2024-02-23 RX ADMIN — MIDODRINE HYDROCHLORIDE 20 MILLIGRAM(S): 2.5 TABLET ORAL at 18:20

## 2024-02-23 RX ADMIN — Medication 3 MILLIGRAM(S): at 23:04

## 2024-02-23 RX ADMIN — MEROPENEM 100 MILLIGRAM(S): 1 INJECTION INTRAVENOUS at 18:21

## 2024-02-23 RX ADMIN — Medication 2 SPRAY(S): at 23:05

## 2024-02-23 RX ADMIN — SENNA PLUS 2 TABLET(S): 8.6 TABLET ORAL at 23:05

## 2024-02-23 RX ADMIN — Medication 3 MILLILITER(S): at 08:56

## 2024-02-23 RX ADMIN — Medication 1 APPLICATION(S): at 18:40

## 2024-02-23 RX ADMIN — PANTOPRAZOLE SODIUM 40 MILLIGRAM(S): 20 TABLET, DELAYED RELEASE ORAL at 07:07

## 2024-02-23 RX ADMIN — ENOXAPARIN SODIUM 50 MILLIGRAM(S): 100 INJECTION SUBCUTANEOUS at 07:08

## 2024-02-23 RX ADMIN — Medication 3 MILLILITER(S): at 14:33

## 2024-02-23 RX ADMIN — MEROPENEM 100 MILLIGRAM(S): 1 INJECTION INTRAVENOUS at 07:09

## 2024-02-23 RX ADMIN — Medication 400 MILLIGRAM(S): at 12:05

## 2024-02-23 RX ADMIN — Medication 1 TABLET(S): at 18:19

## 2024-02-23 RX ADMIN — LEVETIRACETAM 400 MILLIGRAM(S): 250 TABLET, FILM COATED ORAL at 18:24

## 2024-02-23 RX ADMIN — GABAPENTIN 300 MILLIGRAM(S): 400 CAPSULE ORAL at 18:20

## 2024-02-23 RX ADMIN — Medication 1 DROP(S): at 18:40

## 2024-02-23 RX ADMIN — LEVETIRACETAM 400 MILLIGRAM(S): 250 TABLET, FILM COATED ORAL at 07:08

## 2024-02-23 RX ADMIN — Medication 1 APPLICATION(S): at 06:54

## 2024-02-23 RX ADMIN — Medication 4 MILLILITER(S): at 14:34

## 2024-02-23 NOTE — PROGRESS NOTE ADULT - SUBJECTIVE AND OBJECTIVE BOX
24H events:    Patient is a 58y old Female who presents with a chief complaint of Respiratory Distress (23 Feb 2024 09:14)  Primary diagnosis of Sepsis with acute hypoxic respiratory failure  Today is hospital day 34d. This morning patient was seen and examined at bedside, resting comfortably in bed.    No acute or major events overnight. Hemodynamically stable, tolerating oral diet, voiding appropriately with appropriate bowel movements.     NGT not working, will re-place   on HFNC 50L 60%  off levophed since 4pm on 2/22/24    Code Status:    Family communication:  Contact date:  Name of person contacted:  Relationship to patient:  Communication details:  What matters most:    PAST MEDICAL & SURGICAL HISTORY  Down syndrome    Osteoporosis    Mild anemia    Neuropathy    S/P debridement  of R hip on 3/2/21      SOCIAL HISTORY:  Social History:      ALLERGIES:  No Known Allergies    MEDICATIONS:  STANDING MEDICATIONS  acetylcysteine 20%  Inhalation 4 milliLiter(s) Inhalation every 6 hours  albuterol/ipratropium for Nebulization 3 milliLiter(s) Nebulizer every 6 hours  AQUAPHOR (petrolatum Ointment) 1 Application(s) Topical two times a day  artificial  tears Solution 1 Drop(s) Both EYES two times a day  calcium carbonate   1250 mG (OsCal) 1 Tablet(s) Oral two times a day  caspofungin IVPB      caspofungin IVPB 50 milliGRAM(s) IV Intermittent every 24 hours  chlorhexidine 2% Cloths 1 Application(s) Topical daily  dextrose 5% + lactated ringers. 1000 milliLiter(s) IV Continuous <Continuous>  enoxaparin Injectable 50 milliGRAM(s) SubCutaneous every 12 hours  gabapentin 300 milliGRAM(s) Oral every 12 hours  levETIRAcetam  IVPB 500 milliGRAM(s) IV Intermittent two times a day  melatonin 3 milliGRAM(s) Oral at bedtime  meropenem  IVPB 1000 milliGRAM(s) IV Intermittent every 8 hours  midodrine. 20 milliGRAM(s) Oral three times a day  norepinephrine Infusion 0.05 MICROgram(s)/kG/Min IV Continuous <Continuous>  pantoprazole  Injectable 40 milliGRAM(s) IV Push every 24 hours  polyethylene glycol 3350 17 Gram(s) Oral at bedtime  raloxifene 60 milliGRAM(s) Oral daily  saccharomyces boulardii 250 milliGRAM(s) Oral two times a day  senna 2 Tablet(s) Oral at bedtime  sodium chloride 0.65% Nasal 2 Spray(s) Both Nostrils three times a day    PRN MEDICATIONS  aluminum hydroxide/magnesium hydroxide/simethicone Suspension 30 milliLiter(s) Oral every 4 hours PRN  ondansetron Injectable 4 milliGRAM(s) IV Push every 8 hours PRN    VITALS:   T(F): 100.8  HR: 126  BP: 119/63  RR: 20  SpO2: 98%    PHYSICAL EXAM:  Gen: NAD, resting in bed  HEENT: Normocephalic, atraumatic  Neck: supple, no lymphadenopathy  CV: Regular rate & regular rhythm  Lungs: CTABL no wheeze  Abdomen: Soft, NTND+ BS present   Ext: Warm, well perfused no CCE  Neuro: non focal, awake, does not follow commands  Skin: no rash, no erythema      (  ) Indwelling Madsen Catheter:   Date insterted:    Reason (  ) Critical illness     (  ) urinary retention    (  ) Accurate Ins/Outs Monitoring     (  ) CMO patient    (  ) Central Line:   Date inserted:  Location: (  ) Right IJ     (  ) Left IJ     (  ) Right Fem     (  ) Left Fem    (  ) SPC        (  ) pigtail       (  ) PEG tube       (  ) colostomy       (  ) jejunostomy  (  ) U-Dall    LABS:                        8.7    7.82  )-----------( 604      ( 23 Feb 2024 07:59 )             28.6     02-23    137  |  95<L>  |  12  ----------------------------<  115<H>  4.2   |  30  |  0.5<L>    Ca    9.0      23 Feb 2024 07:59  Phos  4.0     02-22  Mg     2.4     02-23    TPro  6.7  /  Alb  2.7<L>  /  TBili  0.2  /  DBili  x   /  AST  19  /  ALT  22  /  AlkPhos  96  02-23      Urinalysis Basic - ( 23 Feb 2024 07:59 )    Color: x / Appearance: x / SG: x / pH: x  Gluc: 115 mg/dL / Ketone: x  / Bili: x / Urobili: x   Blood: x / Protein: x / Nitrite: x   Leuk Esterase: x / RBC: x / WBC x   Sq Epi: x / Non Sq Epi: x / Bacteria: x                RADIOLOGY:    RADIOLOGY

## 2024-02-23 NOTE — PROGRESS NOTE ADULT - SUBJECTIVE AND OBJECTIVE BOX
JERICHO TEA  58y, Female  Allergy: No Known Allergies      LOS  34d    CHIEF COMPLAINT: Respiratory Distress (23 Feb 2024 12:49)      INTERVAL EVENTS/HPI  - febrile   - T(F): , Max: 101.2 (02-23-24 @ 12:00)  - Tolerating medication  - WBC Count: 7.82 (02-23-24 @ 07:59)  WBC Count: 5.36 (02-22-24 @ 06:53)     - Creatinine: 0.5 (02-23-24 @ 07:59)  Creatinine: <0.5 (02-22-24 @ 06:53)       ROS  unable to obtain history secondary to patient's mental status and/or sedation     VITALS:  T(F): 101.2, Max: 101.2 (02-23-24 @ 12:00)  HR: 123  BP: 103/59  RR: 19Vital Signs Last 24 Hrs  T(C): 38.4 (23 Feb 2024 12:00), Max: 38.4 (23 Feb 2024 12:00)  T(F): 101.2 (23 Feb 2024 12:00), Max: 101.2 (23 Feb 2024 12:00)  HR: 123 (23 Feb 2024 12:00) (117 - 126)  BP: 103/59 (23 Feb 2024 12:00) (103/59 - 140/69)  BP(mean): 75 (23 Feb 2024 12:00) (75 - 103)  RR: 19 (23 Feb 2024 14:18) (18 - 29)  SpO2: 97% (23 Feb 2024 14:18) (92% - 98%)    Parameters below as of 23 Feb 2024 14:18  Patient On (Oxygen Delivery Method): nasal cannula, high flow  O2 Flow (L/min): 50  O2 Concentration (%): 60    PHYSICAL EXAM:  Gen: chronically ill appearing   HEENT: Normocephalic, atraumatic  Neck: supple, no lymphadenopathy  CV: Regular rate & regular rhythm  Lungs: decreased BS at bases, no fremitus  Abdomen: Soft, BS present  Ext: Warm, well perfused  Neuro: non focal, not following commands  Skin: no rash, no erythema  Lines: no phlebitis     FH: Non-contributory  Social Hx: Non-contributory    TESTS & MEASUREMENTS:                        8.7    7.82  )-----------( 604      ( 23 Feb 2024 07:59 )             28.6     02-23    137  |  95<L>  |  12  ----------------------------<  115<H>  4.2   |  30  |  0.5<L>    Ca    9.0      23 Feb 2024 07:59  Phos  4.0     02-22  Mg     2.4     02-23    TPro  6.7  /  Alb  2.7<L>  /  TBili  0.2  /  DBili  x   /  AST  19  /  ALT  22  /  AlkPhos  96  02-23      LIVER FUNCTIONS - ( 23 Feb 2024 07:59 )  Alb: 2.7 g/dL / Pro: 6.7 g/dL / ALK PHOS: 96 U/L / ALT: 22 U/L / AST: 19 U/L / GGT: x           Urinalysis Basic - ( 23 Feb 2024 07:59 )    Color: x / Appearance: x / SG: x / pH: x  Gluc: 115 mg/dL / Ketone: x  / Bili: x / Urobili: x   Blood: x / Protein: x / Nitrite: x   Leuk Esterase: x / RBC: x / WBC x   Sq Epi: x / Non Sq Epi: x / Bacteria: x        Culture - Blood (collected 02-19-24 @ 11:23)  Source: .Blood None  Preliminary Report (02-22-24 @ 20:01):    No growth at 72 Hours    Culture - Blood (collected 02-17-24 @ 20:41)  Source: .Blood None  Final Report (02-23-24 @ 06:00):    No growth at 5 days    Culture - Blood (collected 02-12-24 @ 11:17)  Source: .Blood None  Final Report (02-17-24 @ 21:00):    No growth at 5 days    Culture - Blood (collected 02-09-24 @ 11:57)  Source: .Blood None  Final Report (02-14-24 @ 22:00):    No growth at 5 days    Culture - Blood (collected 02-09-24 @ 11:57)  Source: .Blood None  Final Report (02-14-24 @ 22:00):    No growth at 5 days    Culture - Blood (collected 02-03-24 @ 11:13)  Source: .Blood None  Final Report (02-08-24 @ 20:00):    No growth at 5 days    Culture - Blood (collected 02-01-24 @ 11:58)  Source: .Blood None  Final Report (02-06-24 @ 23:06):    No growth at 5 days    Culture - Urine (collected 02-01-24 @ 11:40)  Source: Clean Catch Clean Catch (Midstream)  Final Report (02-03-24 @ 00:06):    >=3 organisms. Probable collection contamination.    Culture - Blood (collected 01-31-24 @ 18:21)  Source: .Blood None  Final Report (02-06-24 @ 01:01):    No growth at 5 days    Culture - Blood (collected 01-31-24 @ 18:21)  Source: .Blood None  Final Report (02-06-24 @ 01:01):    No growth at 5 days            INFECTIOUS DISEASES TESTING  MRSA PCR Result.: Negative (02-20-24 @ 13:42)  Fungitell: 76 (02-19-24 @ 16:00)  Procalcitonin, Serum: 0.16 (02-19-24 @ 16:00)  Procalcitonin, Serum: 0.20 (02-19-24 @ 11:23)  Rapid RVP Result: NotDetec (02-17-24 @ 19:06)  Procalcitonin, Serum: 0.11 (02-17-24 @ 10:41)  MRSA PCR Result.: Negative (02-15-24 @ 06:20)  Procalcitonin, Serum: 0.10 (02-12-24 @ 11:17)  Rapid RVP Result: NotDetec (02-12-24 @ 10:28)  MRSA PCR Result.: Negative (02-12-24 @ 10:28)  Fungitell: 63 (02-06-24 @ 11:27)  Fungitell: 65 (02-06-24 @ 04:43)  Rapid RVP Result: NotDetec (02-04-24 @ 10:28)  MRSA PCR Result.: Negative (02-03-24 @ 21:32)  Procalcitonin, Serum: 0.15 (02-03-24 @ 11:13)  Procalcitonin, Serum: 0.22 (02-01-24 @ 16:00)  MRSA PCR Result.: Negative (01-22-24 @ 05:30)  Streptococcus pneumoniae Ag, Ur Result: Negative (01-21-24 @ 05:00)  Legionella Antigen, Urine: Negative (01-21-24 @ 05:00)  Rapid RVP Result: Detected (01-21-24 @ 00:58)  Procalcitonin, Serum: 0.51 (01-20-24 @ 21:23)  Fungitell: 325 (01-20-24 @ 21:23)  Vancomycin Level, Trough: 5.9 (11-12-23 @ 17:55)  Fungitell: 138 (11-07-23 @ 08:08)  Fungitell: 115 (11-02-23 @ 11:40)  Procalcitonin, Serum: 0.04 (11-02-23 @ 11:40)  Procalcitonin, Serum: 0.26 (10-24-23 @ 11:00)  MRSA PCR Result.: Negative (10-17-23 @ 17:20)  Fungitell: >500 (10-17-23 @ 17:20)  Procalcitonin, Serum: 4.36 (10-17-23 @ 17:20)  Procalcitonin, Serum: 4.18 (10-17-23 @ 12:35)  Procalcitonin, Serum: 0.07 (10-15-23 @ 07:28)  COVID-19 PCR: NotDetec (10-12-23 @ 09:14)  Procalcitonin, Serum: 0.40 (10-07-23 @ 10:54)  Streptococcus pneumoniae Ag, Ur Result: Negative (09-30-23 @ 14:36)  Legionella Antigen, Urine: Negative (09-30-23 @ 14:36)  MRSA PCR Result.: Negative (09-29-23 @ 16:50)  Procalcitonin, Serum: 9.78 (08-12-23 @ 11:25)  MRSA PCR Result.: Negative (08-12-23 @ 06:10)  Rapid RVP Result: NotDetec (08-12-23 @ 01:33)  Procalcitonin, Serum: 0.63 (08-10-23 @ 08:46)  Procalcitonin, Serum: 7.82 (08-04-23 @ 16:13)  MRSA PCR Result.: Negative (08-04-23 @ 14:52)  Rapid RVP Result: NotDetec (08-04-23 @ 03:00)  strept    INFLAMMATORY MARKERS  Sedimentation Rate, Erythrocyte: 100 mm/Hr (02-03-24 @ 11:13)  C-Reactive Protein, Serum: 214.2 mg/L (02-03-24 @ 11:13)  C-Reactive Protein, Serum: 132.3 mg/L (02-01-24 @ 16:00)  Sedimentation Rate, Erythrocyte: 67 mm/Hr (10-15-23 @ 07:28)      RADIOLOGY & ADDITIONAL TESTS:  I have personally reviewed the last available Chest xray  CXR  Xray Chest 1 View- PORTABLE-Urgent:   ACC: [id number] EXAM:  XR CHEST PORTABLE URGENT 1V   ORDERED BY: SERA PHILLIP     PROCEDURE DATE:  02/23/2024          INTERPRETATION:  Clinical History / Reason for exam: Feeding tube   placement    Comparison : Chest radiograph 2/23/2024 performed at 4:08 AM.    Technique/Positioning: Frontal chest radiograph.    Findings:    Support devices: Feeding tube projecting over the gastric bubble.    Cardiac/mediastinum/hilum: Stable.    Lung parenchyma/Pleura: Unchanged bilateral opacities, left greater than   right.    Skeleton/soft tissues: Stable.    Impression:    Stable left greater than right opacities. No pneumothorax.          --- End of Report ---            VARUN GAGE MD; Attending Radiologist  This document has been electronically signed. Feb 23 2024  3:24PM (02-23-24 @ 15:04)      CT  CT Abdomen and Pelvis w/ IV Cont:   ACC: [id number] EXAM:  CT ABDOMEN AND PELVIS IC   ORDERED BY: IOANA SIMMONS     PROCEDURE DATE:  02/21/2024          INTERPRETATION:  CLINICAL STATEMENT: Abscess    TECHNIQUE: Contiguous axial CT images were obtained from the lower chest   to the pubic symphysis following administration of intravenous contrast.    IV contrast documented in unlinked concurrent exam ( ).  Oral contrast   was not administered.  Reformatted images in the coronal and sagittal   planes were acquired.    COMPARISONCT: September 29, 2023    OTHER STUDIES USED FOR CORRELATION: None.      FINDINGS:    HEPATOBILIARY: Contracted gallbladder with cholelithiasis versus wall   calcifications. Enlarged liver measures 21 cm in craniocaudal dimension.    SPLEEN: Unremarkable.    PANCREAS: Unremarkable.    ADRENAL GLANDS: Unremarkable.    KIDNEYS: Symmetric renal enhancement bilaterally. No hydronephrosis.    ABDOMINOPELVIC NODES: No lymphadenopathy.    PELVIC ORGANS: Unremarkable.    PERITONEUM/MESENTERY/BOWEL: No bowel obstruction, ascites or   pneumoperitoneum. Small hiatal hernia.    BONES/SOFT TISSUES: Degenerative changes of the spine noted. Bilateral L5   pars defects with grade 2 anterolisthesis of L5 on S1.      IMPRESSION:  1.  No CT evidence of an acuteabdominopelvic pathology.    See separately dictated CT chest report for intrathoracic findings.    --- End of Report ---            PATRICIA CHAVEZ MD; Attending Radiologist  This document has been electronically signed. Feb 21 2024 10:16AM (02-21-24 @ 00:10)      CARDIOLOGY TESTING  12 Lead ECG:   Ventricular Rate 124 BPM    Atrial Rate 124 BPM    P-R Interval 114 ms    QRS Duration 64 ms    Q-T Interval 328 ms    QTC Calculation(Bazett) 471 ms    P Axis 31 degrees    R Axis 38 degrees    T Axis 49 degrees    Diagnosis Line Sinus tachycardia  Possible Left atrial enlargement  Borderline ECG    Confirmed by MARJAN MENDES MD ([id number]) on 2/23/2024 1:23:43 PM (02-23-24 @ 11:46)      MEDICATIONS  acetylcysteine 20%  Inhalation 4 Inhalation every 6 hours  albuterol/ipratropium for Nebulization 3 Nebulizer every 6 hours  AQUAPHOR (petrolatum Ointment) 1 Topical two times a day  artificial  tears Solution 1 Both EYES two times a day  calcium carbonate   1250 mG (OsCal) 1 Oral two times a day  caspofungin IVPB     caspofungin IVPB 50 IV Intermittent every 24 hours  chlorhexidine 2% Cloths 1 Topical daily  dextrose 5% + lactated ringers. 1000 IV Continuous <Continuous>  enoxaparin Injectable 50 SubCutaneous every 12 hours  gabapentin 300 Oral every 12 hours  levETIRAcetam  IVPB 500 IV Intermittent two times a day  melatonin 3 Oral at bedtime  meropenem  IVPB 1000 IV Intermittent every 8 hours  midodrine. 20 Oral three times a day  norepinephrine Infusion 0.05 IV Continuous <Continuous>  pantoprazole  Injectable 40 IV Push every 24 hours  polyethylene glycol 3350 17 Oral at bedtime  raloxifene 60 Oral daily  saccharomyces boulardii 250 Oral two times a day  senna 2 Oral at bedtime  sodium chloride 0.65% Nasal 2 Both Nostrils three times a day      WEIGHT  Weight (kg): 52.3 (01-21-24 @ 08:00)  Creatinine: 0.5 mg/dL (02-23-24 @ 07:59)      ANTIBIOTICS:  caspofungin IVPB 50 milliGRAM(s) IV Intermittent every 24 hours  caspofungin IVPB      meropenem  IVPB 1000 milliGRAM(s) IV Intermittent every 8 hours      All available historical records have been reviewed

## 2024-02-23 NOTE — PROGRESS NOTE ADULT - ASSESSMENT
58F w/ PMHx Down Syndrome, nonverbal at baseline, Hypothyroidism, Cerebral palsy and Seizure Disorders presents to the ED from nursing facility/group home ([facility]) presented to ED for respiratory distress and high grade fever. Patient found to be septic on admission. Admitted to SDU for management of acute respiratory distress 2/2 CAP/Aspiration PNA (20 Jan 2024 18:22)  While pt was on the floor she developed dyspnea after swallow evaluation, was spiking high grade fever, consulted by pulmonary/critical care and was upgraded back to SDU.    A/P  # Sepsis POA / Acute hypoxic resp failure / RSV bronchiolitis /  H/o Dysphagia/ suspected aspiration PNA /high grade fever   - CXR noted 2/20, stable left large opacities and small right opacity    - completed the course of  meropenem 15 days on 2/16, Meropenem was resumed 2/18 ( as per prior ID recommendations), pt is spiking high grade fever and wbc uptrending.   - Histo Ag negative, only positive blood cx is for coag -ve staph  - currently on HFNC 60L FiO2 80%  - hypotensive again 2/19 despite fluid boluses so started levophed on 2/19 - c/w   Midodrine  20 mg Q 8 hours   - MRSA negative   - BCx (1/20): Staph hominis -  contaminant repeat BCx have been NGTD  - procal downtrending 0.2--0.16  - bolused 250 LR 2/20, levophed requirement is 0.1  - CT chest with cavitation in [place], ID: same abx, obtain TB test  - Tachy today, will start on D5LR 75cc/hr given lack of fluid/feeds for two days due to NGT issues     plan:  - c/w midodrine.   - full rvp, UA, Blood Cx, CXR, fungitell, pan CT for hidden infection source   - ID 2/21: - Repeat fungitell  - D/C Vanco   - D/C PO levaquin  - quantiferon gold  - Doubt TB, isolation dc'd   - Restarted on Meropenem 2/18 given fever/pressors , s/p extensive course. (1/21-1/27), 2/1-2/16, restarted 2/18 , on pressors , critical illness  - Fungitell started downtrending 1 day after Caspo started, doubt related, but at this point given critical illness would plan on completing a course  - Continue caspofungin 50mg q24h IV , hx unclear elevated fungitell. No clear source, ( empiric 14 day course was 2/18) No risk factors for PCP or other invasive fungal infection . Continuing for now  - Grave prognosis, GOC , aggressive care is futile  - Failed FEES, put NG tube for feedings and medications, pt'll likely need PEG/trach when stable, consulted by GI   - Aspiration precautions, order chest PT vest   - Scopolamine patch d/c on 2/14  - supplement oxygen, monitor pulse Ox, on high flow oxygen for weeks   - c/w duoneb  - pulmonary is following, recommendations noted  - aggressive chest PT with vest, aspiration precautions and suction     # Elevated Troponin , type 2 MI/  Sinus tachycardia  - c/w telemetry monitoring   - Repeat EKG: Normal sinus rhythm  - c/w  metoprolol  -  maintain fluid balance   - TTE 2/19 normal EF no DD or valve abnormalities    # Seizure Disorder  - c/w  Keppra   - seizure precautions  - keep Mg above 2.0     # H/o lower ext DVT  - therapeutic Lovenox recommended by ICU team  - Eliquis held     # Hypothyroidism  - Thyroid Stimulating Hormone, Serum: 0.51 uIU/mL (01.21.24 @ 06:34)  - check FT4     # Normocytic Anemia, anemia of chronic disease   - monitor H/H, keep Hb above 7.5   - h/H trending down   - no active bleeding noted ( pt is on full AC)   - get anemia work up     # Down Syndrome/  Cerebral Palsy  - supportive care  - prevent falls and aspiration   - failed speech and swallow, needs PEG   - on Raloxifene     # Full code  - conversation started with patient's representative and progress note faxed to start considering DNR/DNI. for now full code.   La Nena Vasquez [phone]

## 2024-02-23 NOTE — PROGRESS NOTE ADULT - ASSESSMENT
IMPRESSION:    Acute hypoxemic respiratory failure  Likely aspiration pneumonia   Sepsis POA  Septic shock off Levophed   Recurrent aspiration pneumonia / prior intubation  HO DVT on Eliquis   HO GI bleed  HO OM  HO recent duodenal perforation   HO polymicrobial bacteremia   H/o CP, DS  H/o seizures    PLAN:    CNS:  Avoid CNS Depressant, AED. MS at baseline.    HEENT: Oral care.     PULMONARY: HOB at 45 degrees.  Aspiration precaution. Wean FiO2 Keep spo2 more than 92%.  CXR noted. Aggressive pulm toilet, frequent suctioning.     CARDIOVASCULAR: Keep MAP more than 60. Avoid overload. C/w midodrine 20mg.  Goal directed fluid resuscitation.     GI: Protonix. NG feeding.  Might need PEG when more stable    INFECTIOUS DISEASE:  ABX and Anti fungal per ID.  DW ID.  Doubt Tb.     HEMATOLOGICAL:  Lovenox therapeutic.  Monitor CBC    ENDOCRINE:  Follow up FS.  Insulin protocol if needed.    MUSCULOSKELETAL: Bedrest.  Off loading.  Wound care.    Prognosis very poor.    Palliative care following    SDU.

## 2024-02-23 NOTE — PROGRESS NOTE ADULT - SUBJECTIVE AND OBJECTIVE BOX
Patient is a 58y old  Female who presents with a chief complaint of Respiratory Distress (23 Feb 2024 08:41)      Over Night Events: Pt remains on HFNC 50/60, off pressors, febrile 100.8, Tachy to the 120s      ROS:     All ROS are negative except HPI       PHYSICAL EXAM    ICU Vital Signs Last 24 Hrs  T(C): 38.2 (23 Feb 2024 07:51), Max: 38.2 (23 Feb 2024 07:51)  T(F): 100.8 (23 Feb 2024 07:51), Max: 100.8 (23 Feb 2024 07:51)  HR: 126 (23 Feb 2024 07:51) (106 - 126)  BP: 119/63 (23 Feb 2024 07:51) (116/69 - 140/69)  BP(mean): 85 (23 Feb 2024 07:51) (82 - 103)  RR: 20 (23 Feb 2024 07:51) (18 - 29)  SpO2: 98% (23 Feb 2024 07:51) (92% - 98%)    O2 Parameters below as of 23 Feb 2024 07:51  Patient On (Oxygen Delivery Method): nasal cannula w/ humidification      CONSTITUTIONAL:  Ill appearing in NAD    ENT:   Airway patent,     CARDIAC:   Normal rate,   Regular rhythm.    No edema    RESPIRATORY:   Bl BS  no wheezing    GASTROINTESTINAL:  Abdomen soft,   Non-tender,   No guarding,     NEUROLOGICAL:   Does not follow commands    SKIN:   stage 2 DTI sacral and BL feet       LABS:                          8.7    7.82  )-----------( 604      ( 23 Feb 2024 07:59 )             28.6                                               02-23    137  |  95<L>  |  12  ----------------------------<  115<H>  4.2   |  30  |  0.5<L>    Ca    9.0      23 Feb 2024 07:59  Phos  4.0     02-22  Mg     2.4     02-23    TPro  6.7  /  Alb  2.7<L>  /  TBili  0.2  /  DBili  x   /  AST  19  /  ALT  22  /  AlkPhos  96  02-23                                             Urinalysis Basic - ( 23 Feb 2024 07:59 )    Color: x / Appearance: x / SG: x / pH: x  Gluc: 115 mg/dL / Ketone: x  / Bili: x / Urobili: x   Blood: x / Protein: x / Nitrite: x   Leuk Esterase: x / RBC: x / WBC x   Sq Epi: x / Non Sq Epi: x / Bacteria: x                                                  LIVER FUNCTIONS - ( 23 Feb 2024 07:59 )  Alb: 2.7 g/dL / Pro: 6.7 g/dL / ALK PHOS: 96 U/L / ALT: 22 U/L / AST: 19 U/L / GGT: x                                                                                                                                    MEDICATIONS  (STANDING):  acetylcysteine 20%  Inhalation 4 milliLiter(s) Inhalation every 4 hours  albuterol/ipratropium for Nebulization 3 milliLiter(s) Nebulizer every 4 hours  AQUAPHOR (petrolatum Ointment) 1 Application(s) Topical two times a day  artificial  tears Solution 1 Drop(s) Both EYES two times a day  calcium carbonate   1250 mG (OsCal) 1 Tablet(s) Oral two times a day  caspofungin IVPB 50 milliGRAM(s) IV Intermittent every 24 hours  caspofungin IVPB      chlorhexidine 2% Cloths 1 Application(s) Topical daily  enoxaparin Injectable 50 milliGRAM(s) SubCutaneous every 12 hours  gabapentin 300 milliGRAM(s) Oral every 12 hours  levETIRAcetam  IVPB 500 milliGRAM(s) IV Intermittent two times a day  melatonin 3 milliGRAM(s) Oral at bedtime  meropenem  IVPB 1000 milliGRAM(s) IV Intermittent every 8 hours  midodrine. 20 milliGRAM(s) Oral three times a day  norepinephrine Infusion 0.05 MICROgram(s)/kG/Min (4.9 mL/Hr) IV Continuous <Continuous>  pantoprazole  Injectable 40 milliGRAM(s) IV Push every 24 hours  polyethylene glycol 3350 17 Gram(s) Oral at bedtime  raloxifene 60 milliGRAM(s) Oral daily  saccharomyces boulardii 250 milliGRAM(s) Oral two times a day  senna 2 Tablet(s) Oral at bedtime  sodium chloride 0.65% Nasal 2 Spray(s) Both Nostrils three times a day  sodium chloride 3%  Inhalation 4 milliLiter(s) Inhalation every 4 hours    MEDICATIONS  (PRN):  acetaminophen     Tablet .. 650 milliGRAM(s) Oral every 6 hours PRN Temp greater or equal to 38C (100.4F), Mild Pain (1 - 3)  aluminum hydroxide/magnesium hydroxide/simethicone Suspension 30 milliLiter(s) Oral every 4 hours PRN Dyspepsia  ondansetron Injectable 4 milliGRAM(s) IV Push every 8 hours PRN Nausea and/or Vomiting      New X-rays reviewed:                                                                                  ECHO

## 2024-02-23 NOTE — PROGRESS NOTE ADULT - SUBJECTIVE AND OBJECTIVE BOX
MEDICINE ATTENDING PROGRESS NOTE  58F w/ PMHx Down Syndrome, nonverbal at baseline, Hypothyroidism, Cerebral palsy and Seizure Disorders presents to the ED from nursing facility/group home ([facility]) presented to ED for respiratory distress and high grade fever. Patient found to be septic on admission. Admitted to SDU for management of acute respiratory distress 2/2 CAP/Aspiration PNA.    Interval/Overnight Events      ROS  General: Denies fevers, chills, nightsweats, weight loss  HEENT: Denies headache, rhinorrhea, sore throat, eye pain  CV: Denies CP, palpitations  PULM: Denies SOB, cough  GI: Denies abdominal pain, diarrhea  : Denies dysuria, hematuria  MSK: Denies arthralgias  SKIN: Denies rash   NEURO: Denies paresthesias, weakness  PSYCH: Denies depression    MEDICATIONS  (STANDING):  acetylcysteine 20%  Inhalation 4 milliLiter(s) Inhalation every 4 hours  albuterol/ipratropium for Nebulization 3 milliLiter(s) Nebulizer every 4 hours  AQUAPHOR (petrolatum Ointment) 1 Application(s) Topical two times a day  artificial  tears Solution 1 Drop(s) Both EYES two times a day  calcium carbonate   1250 mG (OsCal) 1 Tablet(s) Oral two times a day  caspofungin IVPB      caspofungin IVPB 50 milliGRAM(s) IV Intermittent every 24 hours  chlorhexidine 2% Cloths 1 Application(s) Topical daily  enoxaparin Injectable 50 milliGRAM(s) SubCutaneous every 12 hours  gabapentin 300 milliGRAM(s) Oral every 12 hours  levETIRAcetam  IVPB 500 milliGRAM(s) IV Intermittent two times a day  melatonin 3 milliGRAM(s) Oral at bedtime  meropenem  IVPB 1000 milliGRAM(s) IV Intermittent every 8 hours  midodrine. 20 milliGRAM(s) Oral three times a day  norepinephrine Infusion 0.05 MICROgram(s)/kG/Min (4.9 mL/Hr) IV Continuous <Continuous>  pantoprazole  Injectable 40 milliGRAM(s) IV Push every 24 hours  polyethylene glycol 3350 17 Gram(s) Oral at bedtime  raloxifene 60 milliGRAM(s) Oral daily  saccharomyces boulardii 250 milliGRAM(s) Oral two times a day  senna 2 Tablet(s) Oral at bedtime  sodium chloride 0.65% Nasal 2 Spray(s) Both Nostrils three times a day  sodium chloride 3%  Inhalation 4 milliLiter(s) Inhalation every 4 hours    MEDICATIONS  (PRN):  acetaminophen     Tablet .. 650 milliGRAM(s) Oral every 6 hours PRN Temp greater or equal to 38C (100.4F), Mild Pain (1 - 3)  aluminum hydroxide/magnesium hydroxide/simethicone Suspension 30 milliLiter(s) Oral every 4 hours PRN Dyspepsia  ondansetron Injectable 4 milliGRAM(s) IV Push every 8 hours PRN Nausea and/or Vomiting    ANTIBIOTICS:  caspofungin IVPB 50 milliGRAM(s) IV Intermittent every 24 hours  caspofungin IVPB      meropenem  IVPB 1000 milliGRAM(s) IV Intermittent every 8 hours      VITALS:  T(F): 98.9, Max: 98.9 (02-23-24 @ 04:00)  HR: 120  BP: 120/60  RR: 20Vital Signs Last 24 Hrs  T(C): 37.2 (23 Feb 2024 04:00), Max: 37.2 (23 Feb 2024 04:00)  T(F): 98.9 (23 Feb 2024 04:00), Max: 98.9 (23 Feb 2024 04:00)  HR: 120 (23 Feb 2024 04:00) (106 - 123)  BP: 120/60 (23 Feb 2024 04:00) (116/69 - 140/69)  BP(mean): 82 (23 Feb 2024 04:00) (82 - 103)  RR: 20 (23 Feb 2024 04:00) (18 - 29)  SpO2: 94% (23 Feb 2024 04:00) (92% - 94%)    Parameters below as of 22 Feb 2024 16:00  Patient On (Oxygen Delivery Method): nasal cannula, high flow     I&O's Summary    PHYSICAL EXAM:  Gen: NAD, resting in bed  HEENT: Normocephalic, atraumatic  Neck: supple, no lymphadenopathy  CV: Regular rate & regular rhythm  Lungs: CTABL no wheeze  Abdomen: Soft, NTND+ BS present  Ext: Warm, well perfused no CCE  Neuro: non focal, awake, CN II-XII intact   Skin: no rash, no erythema  Psych: no SI, HI, Hallucination     LABS:                        8.7    7.82  )-----------( 604      ( 23 Feb 2024 07:59 )             28.6     02-22    136  |  94<L>  |  10  ----------------------------<  108<H>  4.4   |  32  |  <0.5<L>    Ca    9.0      22 Feb 2024 06:53  Phos  4.0     02-22  Mg     2.4     02-22    TPro  6.4  /  Alb  2.7<L>  /  TBili  <0.2  /  DBili  x   /  AST  25  /  ALT  26  /  AlkPhos  92  02-22      LIVER FUNCTIONS - ( 22 Feb 2024 06:53 )  Alb: 2.7 g/dL / Pro: 6.4 g/dL / ALK PHOS: 92 U/L / ALT: 26 U/L / AST: 25 U/L / GGT: x               MICROBIOLOGY:  Urinalysis Basic - ( 22 Feb 2024 06:53 )    Color: x / Appearance: x / SG: x / pH: x  Gluc: 108 mg/dL / Ketone: x  / Bili: x / Urobili: x   Blood: x / Protein: x / Nitrite: x   Leuk Esterase: x / RBC: x / WBC x   Sq Epi: x / Non Sq Epi: x / Bacteria: x        Culture - Blood (collected 02-19-24 @ 11:23)  Source: .Blood None  Preliminary Report (02-22-24 @ 20:01):    No growth at 72 Hours    Culture - Blood (collected 02-17-24 @ 20:41)  Source: .Blood None  Final Report (02-23-24 @ 06:00):    No growth at 5 days      SARS-CoV-2: NotDetec (17 Feb 2024 19:06)  SARS-CoV-2: NotDetec (12 Feb 2024 10:28)  SARS-CoV-2: NotDetec (04 Feb 2024 10:28)  SARS-CoV-2: NotDetec (21 Jan 2024 00:58)  COVID-19 PCR: NotDetec (12 Oct 2023 09:14)      MRSA PCR Result.: Negative (02-20-24 @ 13:42)  Fungitell: 76 pg/mL (02-19-24 @ 16:00)  MRSA PCR Result.: Negative (02-15-24 @ 06:20)  MRSA PCR Result.: Negative (02-12-24 @ 10:28)  Fungitell: 63 pg/mL (02-06-24 @ 11:27)  Fungitell: 65 pg/mL (02-06-24 @ 04:43)  MRSA PCR Result.: Negative (02-03-24 @ 21:32)  MRSA PCR Result.: Negative (01-22-24 @ 05:30)  Legionella Antigen, Urine: Negative (01-21-24 @ 05:00)  Fungitell: 325 pg/mL (01-20-24 @ 21:23)  Fungitell: 138 pg/mL (11-07-23 @ 08:08)  Fungitell: 115 pg/mL (11-02-23 @ 11:40)  MRSA PCR Result.: Negative (10-17-23 @ 17:20)  Fungitell: >500 pg/mL (10-17-23 @ 17:20)  Legionella Antigen, Urine: Negative (09-30-23 @ 14:36)  MRSA PCR Result.: Negative (09-29-23 @ 16:50)  MRSA PCR Result.: Negative (08-12-23 @ 06:10)  MRSA PCR Result.: Negative (08-04-23 @ 14:52)      IMAGING:      CARDIOLOGY TESTING      ASSESSMENT/PLAN:  58F w/ PMHx Down Syndrome, nonverbal at baseline, Hypothyroidism, Cerebral palsy and Seizure Disorders presents to the ED from nursing facility/group home ([facility]) presented to ED for respiratory distress and high grade fever. Patient found to be septic on admission. Admitted to SDU for management of acute respiratory distress 2/2 CAP/Aspiration PNA (20 Jan 2024 18:22)  While pt was on the floor she developed dyspnea after swallow evaluation, was spiking high grade fever, consulted by pulmonary/critical care and was upgraded back to SDU.    # Sepsis POA / Acute hypoxic resp failure / RSV bronchiolitis /  H/o Dysphagia/ suspected aspiration PNA /high grade fever   - CXR noted 2/20, stable left large opacities and small right opacity    - completed the course of  meropenem 15 days on 2/16, Meropenem was resumed 2/18 ( as per prior ID recommendations), pt is spiking high grade fever and wbc uptrending.   - Histo Ag negative, only positive blood cx is for coag -ve staph  - currently on HFNC 60L FiO2 80%  - hypotensive again 2/19 despite fluid boluses so started levophed on 2/19 - c/w   Midodrine  20 mg Q 8 hours   - MRSA negative   - BCx (1/20): Staph hominis -  contaminant repeat BCx have been NGTD  - procal downtrending 0.2--0.16  - bolused 250 LR 2/20, levophed requirement is 0.1  - CT chest with cavitation in [place], ID: same abx, obtain TB test    plan:  - c/w levophed and midodrine.   - full rvp, UA, Blood Cx, CXR, fungitell, pan CT for hidden infection source   - ID 2/20: Repeat fungitell, Repeat MRSA nares- if (-) then D/C vancomycin, Recommend CT CAP, PO levaquin 500mg daily x 7 days end 2/21. Restarted on Meropenem 2/18 given fever/pressors , s/p extensive course. (1/21-1/27), 2/1-2/16, restarted 2/18 , on pressors , critical illness. Fungitell started downtrending 1 day after Caspo started, doubt related, but at this point given critical illness would plan on completing a course. Continue caspofungin 50mg q24h IV , hx unclear elevated fungitell. No clear source, ( empiric 14 day course was 2/18) No risk factors for PCP or other invasive fungal infection . Continuing for now.  - Failed FEES, put NG tube for feedings and medications, pt'll likely need PEG/trach when stable, consulted by GI   - Aspiration precautions, order chest PT vest   - Scopolamine patch d/c on 2/14  - supplement oxygen, monitor pulse Ox, on high flow oxygen for weeks   - c/w duoneb  - pulmonary is following, recommendations noted  - aggressive chest PT with vest, aspiration precautions and suction     # Elevated Troponin , type 2 MI/  Sinus tachycardia  - c/w telemetry monitoring   - Repeat EKG: Normal sinus rhythm  - c/w  metoprolol  -  maintain fluid balance   - TTE 2/19 normal EF no DD or valve abnormalities    # Seizure Disorder  - c/w  Keppra   - seizure precautions  - keep Mg above 2.0     # H/o lower ext DVT  - therapeutic Lovenox recommended by ICU team  - Eliquis held     # Hypothyroidism  - Thyroid Stimulating Hormone, Serum: 0.51 uIU/mL (01.21.24 @ 06:34)    # Normocytic Anemia, anemia of chronic disease   - monitor H/H, keep Hb above 7.5   - h/H trending down   - no active bleeding noted ( pt is on full AC)   - get anemia work up     # Down Syndrome/  Cerebral Palsy  - supportive care  - prevent falls and aspiration   - failed speech and swallow, needs PEG   - on Raloxifene     #Supportive Management:  Dispo:   DVT Ppx: enoxaparin Injectable 50 milliGRAM(s) SubCutaneous every 12 hours  GI Ppx: pantoprazole  Injectable 40 milliGRAM(s) IV Push every 24 hours  Diet:  Diet, NPO with Tube Feed:   Tube Feeding Modality: Nasogastric  Jevity 1.2 Juventino  Total Volume for 24 Hours (mL): 1200  Bolus  Total Volume of Bolus (mL):  300  Tube Feed Frequency: Every 6 hours   Tube Feed Start Time: 00:00   Bolus Feed Duration (in Hours): 1.5  Free Water Flush  Bolus   Total Volume per Flush (mL): 250   Frequency: Every 6 Hours  Free Water Flush Instructions:  75mL water flush pre and then post feeds. Keep HOB >45 degress during feeds  No Carb Prosource TF     Qty per Day:  1 (02-12-24 @ 08:30) [Active]      Total time spent to complete patient's bedside assessment, review medical chart, discuss medical plan of care with covering medical team was more than 45 minutes with >50% of time spent face to face with patient, discussion with patient/family and/or coordination of care    Housestaff's notes reviewed. When there is confusion regarding the content or information provided in the notes of a housestaff (resident, medical student, physician assistant, or nurse practioner) and the attending physician notes, the attending physician's note takes precedence and supersedes the other notes.    Missael Montanez MD/Janet  Attending Physician MEDICINE ATTENDING PROGRESS NOTE  58F w/ PMHx Down Syndrome, nonverbal at baseline, Hypothyroidism, Cerebral palsy and Seizure Disorders presents to the ED from nursing facility/group home ([facility]) presented to ED for respiratory distress and high grade fever. Patient found to be septic on admission. Admitted to SDU for management of acute respiratory distress 2/2 CAP/Aspiration PNA.    Interval/Overnight Events  - No acute complaints  - on HF; off levophed and BP is holding  - Adjust NGT    ROS  General: Denies fevers, chills, nightsweats, weight loss  HEENT: Denies headache, rhinorrhea, sore throat, eye pain  CV: Denies CP, palpitations  PULM: Denies SOB, cough  GI: Denies abdominal pain, diarrhea  : Denies dysuria, hematuria  MSK: Denies arthralgias  SKIN: Denies rash   NEURO: Denies paresthesias, weakness  PSYCH: Denies depression    MEDICATIONS  (STANDING):  acetylcysteine 20%  Inhalation 4 milliLiter(s) Inhalation every 4 hours  albuterol/ipratropium for Nebulization 3 milliLiter(s) Nebulizer every 4 hours  AQUAPHOR (petrolatum Ointment) 1 Application(s) Topical two times a day  artificial  tears Solution 1 Drop(s) Both EYES two times a day  calcium carbonate   1250 mG (OsCal) 1 Tablet(s) Oral two times a day  caspofungin IVPB      caspofungin IVPB 50 milliGRAM(s) IV Intermittent every 24 hours  chlorhexidine 2% Cloths 1 Application(s) Topical daily  enoxaparin Injectable 50 milliGRAM(s) SubCutaneous every 12 hours  gabapentin 300 milliGRAM(s) Oral every 12 hours  levETIRAcetam  IVPB 500 milliGRAM(s) IV Intermittent two times a day  melatonin 3 milliGRAM(s) Oral at bedtime  meropenem  IVPB 1000 milliGRAM(s) IV Intermittent every 8 hours  midodrine. 20 milliGRAM(s) Oral three times a day  norepinephrine Infusion 0.05 MICROgram(s)/kG/Min (4.9 mL/Hr) IV Continuous <Continuous>  pantoprazole  Injectable 40 milliGRAM(s) IV Push every 24 hours  polyethylene glycol 3350 17 Gram(s) Oral at bedtime  raloxifene 60 milliGRAM(s) Oral daily  saccharomyces boulardii 250 milliGRAM(s) Oral two times a day  senna 2 Tablet(s) Oral at bedtime  sodium chloride 0.65% Nasal 2 Spray(s) Both Nostrils three times a day  sodium chloride 3%  Inhalation 4 milliLiter(s) Inhalation every 4 hours    MEDICATIONS  (PRN):  acetaminophen     Tablet .. 650 milliGRAM(s) Oral every 6 hours PRN Temp greater or equal to 38C (100.4F), Mild Pain (1 - 3)  aluminum hydroxide/magnesium hydroxide/simethicone Suspension 30 milliLiter(s) Oral every 4 hours PRN Dyspepsia  ondansetron Injectable 4 milliGRAM(s) IV Push every 8 hours PRN Nausea and/or Vomiting    ANTIBIOTICS:  caspofungin IVPB 50 milliGRAM(s) IV Intermittent every 24 hours  caspofungin IVPB      meropenem  IVPB 1000 milliGRAM(s) IV Intermittent every 8 hours      VITALS:  T(F): 98.9, Max: 98.9 (02-23-24 @ 04:00)  HR: 120  BP: 120/60  RR: 20Vital Signs Last 24 Hrs  T(C): 37.2 (23 Feb 2024 04:00), Max: 37.2 (23 Feb 2024 04:00)  T(F): 98.9 (23 Feb 2024 04:00), Max: 98.9 (23 Feb 2024 04:00)  HR: 120 (23 Feb 2024 04:00) (106 - 123)  BP: 120/60 (23 Feb 2024 04:00) (116/69 - 140/69)  BP(mean): 82 (23 Feb 2024 04:00) (82 - 103)  RR: 20 (23 Feb 2024 04:00) (18 - 29)  SpO2: 94% (23 Feb 2024 04:00) (92% - 94%)    Parameters below as of 22 Feb 2024 16:00  Patient On (Oxygen Delivery Method): nasal cannula, high flow     I&O's Summary    PHYSICAL EXAM:  Gen: NAD, resting in bed  HEENT: Normocephalic, atraumatic  Neck: supple, no lymphadenopathy  CV: Regular rate & regular rhythm  Lungs: CTABL no wheeze  Abdomen: Soft, NTND+ BS present  Ext: Warm, well perfused no CCE  Neuro: non focal, awake, CN II-XII intact   Skin: no rash, no erythema  Psych: no SI, HI, Hallucination     LABS:                        8.7    7.82  )-----------( 604      ( 23 Feb 2024 07:59 )             28.6     02-22    136  |  94<L>  |  10  ----------------------------<  108<H>  4.4   |  32  |  <0.5<L>    Ca    9.0      22 Feb 2024 06:53  Phos  4.0     02-22  Mg     2.4     02-22    TPro  6.4  /  Alb  2.7<L>  /  TBili  <0.2  /  DBili  x   /  AST  25  /  ALT  26  /  AlkPhos  92  02-22      LIVER FUNCTIONS - ( 22 Feb 2024 06:53 )  Alb: 2.7 g/dL / Pro: 6.4 g/dL / ALK PHOS: 92 U/L / ALT: 26 U/L / AST: 25 U/L / GGT: x               MICROBIOLOGY:  Urinalysis Basic - ( 22 Feb 2024 06:53 )    Color: x / Appearance: x / SG: x / pH: x  Gluc: 108 mg/dL / Ketone: x  / Bili: x / Urobili: x   Blood: x / Protein: x / Nitrite: x   Leuk Esterase: x / RBC: x / WBC x   Sq Epi: x / Non Sq Epi: x / Bacteria: x        Culture - Blood (collected 02-19-24 @ 11:23)  Source: .Blood None  Preliminary Report (02-22-24 @ 20:01):    No growth at 72 Hours    Culture - Blood (collected 02-17-24 @ 20:41)  Source: .Blood None  Final Report (02-23-24 @ 06:00):    No growth at 5 days      SARS-CoV-2: NotDetec (17 Feb 2024 19:06)  SARS-CoV-2: NotDetec (12 Feb 2024 10:28)  SARS-CoV-2: NotDetec (04 Feb 2024 10:28)  SARS-CoV-2: NotDetec (21 Jan 2024 00:58)  COVID-19 PCR: NotDetec (12 Oct 2023 09:14)      MRSA PCR Result.: Negative (02-20-24 @ 13:42)  Fungitell: 76 pg/mL (02-19-24 @ 16:00)  MRSA PCR Result.: Negative (02-15-24 @ 06:20)  MRSA PCR Result.: Negative (02-12-24 @ 10:28)  Fungitell: 63 pg/mL (02-06-24 @ 11:27)  Fungitell: 65 pg/mL (02-06-24 @ 04:43)  MRSA PCR Result.: Negative (02-03-24 @ 21:32)  MRSA PCR Result.: Negative (01-22-24 @ 05:30)  Legionella Antigen, Urine: Negative (01-21-24 @ 05:00)  Fungitell: 325 pg/mL (01-20-24 @ 21:23)  Fungitell: 138 pg/mL (11-07-23 @ 08:08)  Fungitell: 115 pg/mL (11-02-23 @ 11:40)  MRSA PCR Result.: Negative (10-17-23 @ 17:20)  Fungitell: >500 pg/mL (10-17-23 @ 17:20)  Legionella Antigen, Urine: Negative (09-30-23 @ 14:36)  MRSA PCR Result.: Negative (09-29-23 @ 16:50)  MRSA PCR Result.: Negative (08-12-23 @ 06:10)  MRSA PCR Result.: Negative (08-04-23 @ 14:52)      IMAGING:      CARDIOLOGY TESTING      ASSESSMENT/PLAN:  58F w/ PMHx Down Syndrome, nonverbal at baseline, Hypothyroidism, Cerebral palsy and Seizure Disorders presents to the ED from nursing facility/group home ([facility]) presented to ED for respiratory distress and high grade fever. Patient found to be septic on admission. Admitted to SDU for management of acute respiratory distress 2/2 CAP/Aspiration PNA (20 Jan 2024 18:22)  While pt was on the floor she developed dyspnea after swallow evaluation, was spiking high grade fever, consulted by pulmonary/critical care and was upgraded back to SDU.    # Sepsis POA / Acute hypoxic resp failure / RSV bronchiolitis /  H/o Dysphagia/ suspected aspiration PNA /high grade fever   - CXR noted 2/20, stable left large opacities and small right opacity    - completed the course of  meropenem 15 days on 2/16, Meropenem was resumed 2/18 ( as per prior ID recommendations), pt is spiking high grade fever and wbc uptrending.   - Histo Ag negative, only positive blood cx is for coag -ve staph  - currently on HFNC 60L FiO2 80%  - hypotensive again 2/19 despite fluid boluses so started levophed on 2/19 - c/w   Midodrine  20 mg Q 8 hours   - MRSA negative   - BCx (1/20): Staph hominis -  contaminant repeat BCx have been NGTD  - procal downtrending 0.2--0.16  - bolused 250 LR 2/20, levophed requirement is 0.1  - CT chest with cavitation in [place], ID: same abx, obtain TB test    plan:  - c/w levophed and midodrine.   - full rvp, UA, Blood Cx, CXR, fungitell, pan CT for hidden infection source   - ID 2/20: Repeat fungitell, Repeat MRSA nares- if (-) then D/C vancomycin, Recommend CT CAP, PO levaquin 500mg daily x 7 days end 2/21. Restarted on Meropenem 2/18 given fever/pressors , s/p extensive course. (1/21-1/27), 2/1-2/16, restarted 2/18 , on pressors , critical illness. Fungitell started downtrending 1 day after Caspo started, doubt related, but at this point given critical illness would plan on completing a course. Continue caspofungin 50mg q24h IV , hx unclear elevated fungitell. No clear source, ( empiric 14 day course was 2/18) No risk factors for PCP or other invasive fungal infection . Continuing for now.  - Failed FEES, put NG tube for feedings and medications, pt'll likely need PEG/trach when stable, consulted by GI   - Aspiration precautions, order chest PT vest   - Scopolamine patch d/c on 2/14  - supplement oxygen, monitor pulse Ox, on high flow oxygen for weeks   - c/w duoneb  - pulmonary is following, recommendations noted  - aggressive chest PT with vest, aspiration precautions and suction     # Elevated Troponin , type 2 MI/  Sinus tachycardia  - c/w telemetry monitoring   - Repeat EKG: Normal sinus rhythm  - c/w  metoprolol  -  maintain fluid balance   - TTE 2/19 normal EF no DD or valve abnormalities    # Seizure Disorder  - c/w  Keppra   - seizure precautions  - keep Mg above 2.0     # H/o lower ext DVT  - therapeutic Lovenox recommended by ICU team  - Eliquis held     # Hypothyroidism  - Thyroid Stimulating Hormone, Serum: 0.51 uIU/mL (01.21.24 @ 06:34)    # Normocytic Anemia, anemia of chronic disease   - monitor H/H, keep Hb above 7.5   - h/H trending down   - no active bleeding noted ( pt is on full AC)   - get anemia work up     # Down Syndrome/  Cerebral Palsy  - supportive care  - prevent falls and aspiration   - failed speech and swallow, needs PEG   - on Raloxifene     #Supportive Management:  Dispo:   DVT Ppx: enoxaparin Injectable 50 milliGRAM(s) SubCutaneous every 12 hours  GI Ppx: pantoprazole  Injectable 40 milliGRAM(s) IV Push every 24 hours  Diet:  Diet, NPO with Tube Feed:   Tube Feeding Modality: Nasogastric  Jevity 1.2 Juventino  Total Volume for 24 Hours (mL): 1200  Bolus  Total Volume of Bolus (mL):  300  Tube Feed Frequency: Every 6 hours   Tube Feed Start Time: 00:00   Bolus Feed Duration (in Hours): 1.5  Free Water Flush  Bolus   Total Volume per Flush (mL): 250   Frequency: Every 6 Hours  Free Water Flush Instructions:  75mL water flush pre and then post feeds. Keep HOB >45 degress during feeds  No Carb Prosource TF     Qty per Day:  1 (02-12-24 @ 08:30) [Active]      Total time spent to complete patient's bedside assessment, review medical chart, discuss medical plan of care with covering medical team was more than 45 minutes with >50% of time spent face to face with patient, discussion with patient/family and/or coordination of care    Housestaff's notes reviewed. When there is confusion regarding the content or information provided in the notes of a housestaff (resident, medical student, physician assistant, or nurse practioner) and the attending physician notes, the attending physician's note takes precedence and supersedes the other notes.    Missael Montanez MD/Janet  Attending Physician

## 2024-02-23 NOTE — PROGRESS NOTE ADULT - ASSESSMENT
ASSESSMENT  58F w/ PMHx Down Syndrome, nonverbal at baseline, Hypothyroidism, Cerebral palsy and Seizure Disorders presents to the ED from nursing facility/group home presented to ED for respiratory distress and high grade fever.     IMPRESSION  #Fevers, resolving , HAP / aspiration with cavitary PNA  Doubt TB, admission CXR without findings   < from: CT Chest w/ IV Cont (02.21.24 @ 00:09) >  Bilateral pneumonia, left worse than right, with a left upper lobe   thick-walled cavity. Diffuse opacification of the   left lung is seen with what appears to be some mucus plugging centrally   within the left mainstem bronchus. There is a pleural effusion.   Reactive mediastinal   lymph nodes are seen.    MRSA PCR Result.: Negative (02-20-24 @ 13:42)    Procalcitonin, Serum: 0.16 (02-19-24 @ 16:00)- unremarkable     2/17 BCX NGTD     Rapid RVP Result: NotDetec (02-17-24 @ 19:06)    2/12 BCX NGTD    Procalcitonin, Serum: 0.10 (02-12-24 @ 11:17)    Rapid RVP Result: NotDetec (02-12-24 @ 10:28)    MRSA PCR Result.: Negative (02-12-24 @ 10:28)    2/9 BCX NGTD x2    2/3 BCX NGTD     2/1 BCX NGTD     2/1 UCX   >=3 organisms. Probable collection contamination.    1/31 BCX NG    Rapid RVP Result: NotDetec (02-04-24 @ 10:28)    MRSA PCR Result.: Negative (02-03-24 @ 21:32)     UA without significant pyuria   Procalcitonin, Serum: 0.22 (02-01-24 @ 16:00)--> Procalcitonin, Serum: 0.15 (02-03-24 @ 11:13), downtrending ; unremarkable   MRSA PCR Result.: Negative (01-22-24 @ 05:30)  < from: Xray Chest 1 View-PORTABLE IMMEDIATE (Xray Chest 1 View-PORTABLE IMMEDIATE .) (02.01.24 @ 03:02) >  Bibasal opacities without significant change.   #Acute hypoxic respiratory failure- Gram Negative pneumonia   #1/20 BCX 1/4 bottles : Staphylococcus hominis- contaminant   Repeat CX NG   #Severe Sepsis on admission  #RSV + 1/21  #Elevated fungitell   serum aspergillus galactomannan and histo are (-)  Fungitell: 76 (02-19-24 @ 16:00)  Fungitell: 63 (02-06-24 @ 11:27)  Fungitell: 325 (01-20-24 @ 21:23)  Fungitell: 138 (11-07-23 @ 08:08)  Fungitell: 115 (11-02-23 @ 11:40)  Fungitell: >500 pg/mL (10.17.23 @ 17:20) s/p empiric caspo   #Full thickness ulcer right heel- Appreciate burn/podiatry evaluation.  #History of Right planter foot ulcers - two full thickness ulcers - serous drainage with mild erythema with OM  - MR Foot No Cont, Right (10.16.23 @ 21:51): IMPRESSION: 1.  Limited exam. 2.  Osteomyelitis of the first metatarsal stump. 3.  Osteomyelitis of the second toe distal phalanx.  - s/p excidional debridement to and including bone 1st metatarsal with partial 2nd digit amputation - 1st metatarsal head resected - Wound Cx Proteus ESBL   #CKD 2-3 Creatinine: 0.7 mg/dL (02.02.24 @ 04:59)      #History of buttock ulcer  #Down syndrome/Cerebral Palsy     RECOMMENDATIONS  - Repeat fungitell  - fever, send BCX  - quantiferon gold  - Doubt TB- no need for airborne isolation  - Continue Meropenem for suspected lung abscess   (1/21-1/27), 2/1-2/16, restarted 2/18 , will need 4-6 week course with repeat CT [facility] to determine final course   - Fungitell started downtrending 1 day after Caspo started, doubt related, but at this point given critical illness would plan on completing a course  - Continue caspofungin 50mg q24h IV , hx unclear elevated fungitell. No clear source, ( empiric 14 day course was 2/18) No risk factors for PCP or other invasive fungal infection . Continuing for now  - Grave prognosis, GOC , aggressive care is futile    If any questions, please send a message or call on [employer] Teams  Please continue to update ID with any pertinent new laboratory or radiographic findings.

## 2024-02-24 LAB
ALBUMIN SERPL ELPH-MCNC: 2.4 G/DL — LOW (ref 3.5–5.2)
ALP SERPL-CCNC: 81 U/L — SIGNIFICANT CHANGE UP (ref 30–115)
ALT FLD-CCNC: 18 U/L — SIGNIFICANT CHANGE UP (ref 0–41)
ANION GAP SERPL CALC-SCNC: 9 MMOL/L — SIGNIFICANT CHANGE UP (ref 7–14)
AST SERPL-CCNC: 17 U/L — SIGNIFICANT CHANGE UP (ref 0–41)
BASOPHILS # BLD AUTO: 0.08 K/UL — SIGNIFICANT CHANGE UP (ref 0–0.2)
BASOPHILS NFR BLD AUTO: 1 % — SIGNIFICANT CHANGE UP (ref 0–1)
BILIRUB SERPL-MCNC: <0.2 MG/DL — SIGNIFICANT CHANGE UP (ref 0.2–1.2)
BUN SERPL-MCNC: 14 MG/DL — SIGNIFICANT CHANGE UP (ref 10–20)
CALCIUM SERPL-MCNC: 8.6 MG/DL — SIGNIFICANT CHANGE UP (ref 8.4–10.5)
CHLORIDE SERPL-SCNC: 102 MMOL/L — SIGNIFICANT CHANGE UP (ref 98–110)
CO2 SERPL-SCNC: 31 MMOL/L — SIGNIFICANT CHANGE UP (ref 17–32)
CREAT SERPL-MCNC: 0.5 MG/DL — LOW (ref 0.7–1.5)
CULTURE RESULTS: SIGNIFICANT CHANGE UP
EGFR: 109 ML/MIN/1.73M2 — SIGNIFICANT CHANGE UP
EOSINOPHIL # BLD AUTO: 0.19 K/UL — SIGNIFICANT CHANGE UP (ref 0–0.7)
EOSINOPHIL NFR BLD AUTO: 2.3 % — SIGNIFICANT CHANGE UP (ref 0–8)
GAMMA INTERFERON BACKGROUND BLD IA-ACNC: 0.02 IU/ML — SIGNIFICANT CHANGE UP
GLUCOSE SERPL-MCNC: 154 MG/DL — HIGH (ref 70–99)
HCT VFR BLD CALC: 27 % — LOW (ref 37–47)
HGB BLD-MCNC: 8.2 G/DL — LOW (ref 12–16)
IMM GRANULOCYTES NFR BLD AUTO: 0.6 % — HIGH (ref 0.1–0.3)
LYMPHOCYTES # BLD AUTO: 1.14 K/UL — LOW (ref 1.2–3.4)
LYMPHOCYTES # BLD AUTO: 13.6 % — LOW (ref 20.5–51.1)
M TB IFN-G BLD-IMP: NEGATIVE — SIGNIFICANT CHANGE UP
M TB IFN-G CD4+ BCKGRND COR BLD-ACNC: 0 IU/ML — SIGNIFICANT CHANGE UP
M TB IFN-G CD4+CD8+ BCKGRND COR BLD-ACNC: 0 IU/ML — SIGNIFICANT CHANGE UP
MAGNESIUM SERPL-MCNC: 2.3 MG/DL — SIGNIFICANT CHANGE UP (ref 1.8–2.4)
MCHC RBC-ENTMCNC: 23.8 PG — LOW (ref 27–31)
MCHC RBC-ENTMCNC: 30.4 G/DL — LOW (ref 32–37)
MCV RBC AUTO: 78.5 FL — LOW (ref 81–99)
MONOCYTES # BLD AUTO: 0.57 K/UL — SIGNIFICANT CHANGE UP (ref 0.1–0.6)
MONOCYTES NFR BLD AUTO: 6.8 % — SIGNIFICANT CHANGE UP (ref 1.7–9.3)
NEUTROPHILS # BLD AUTO: 6.37 K/UL — SIGNIFICANT CHANGE UP (ref 1.4–6.5)
NEUTROPHILS NFR BLD AUTO: 75.7 % — HIGH (ref 42.2–75.2)
NRBC # BLD: 0 /100 WBCS — SIGNIFICANT CHANGE UP (ref 0–0)
PLATELET # BLD AUTO: 530 K/UL — HIGH (ref 130–400)
PMV BLD: 9.9 FL — SIGNIFICANT CHANGE UP (ref 7.4–10.4)
POTASSIUM SERPL-MCNC: 3.8 MMOL/L — SIGNIFICANT CHANGE UP (ref 3.5–5)
POTASSIUM SERPL-SCNC: 3.8 MMOL/L — SIGNIFICANT CHANGE UP (ref 3.5–5)
PROT SERPL-MCNC: 6.1 G/DL — SIGNIFICANT CHANGE UP (ref 6–8)
QUANT TB PLUS MITOGEN MINUS NIL: 0.52 IU/ML — SIGNIFICANT CHANGE UP
RBC # BLD: 3.44 M/UL — LOW (ref 4.2–5.4)
RBC # FLD: 20.8 % — HIGH (ref 11.5–14.5)
SODIUM SERPL-SCNC: 142 MMOL/L — SIGNIFICANT CHANGE UP (ref 135–146)
SPECIMEN SOURCE: SIGNIFICANT CHANGE UP
WBC # BLD: 8.4 K/UL — SIGNIFICANT CHANGE UP (ref 4.8–10.8)
WBC # FLD AUTO: 8.4 K/UL — SIGNIFICANT CHANGE UP (ref 4.8–10.8)

## 2024-02-24 PROCEDURE — 99233 SBSQ HOSP IP/OBS HIGH 50: CPT

## 2024-02-24 PROCEDURE — 71045 X-RAY EXAM CHEST 1 VIEW: CPT | Mod: 26

## 2024-02-24 RX ADMIN — MIDODRINE HYDROCHLORIDE 20 MILLIGRAM(S): 2.5 TABLET ORAL at 13:04

## 2024-02-24 RX ADMIN — Medication 4 MILLILITER(S): at 08:41

## 2024-02-24 RX ADMIN — Medication 1 DROP(S): at 18:26

## 2024-02-24 RX ADMIN — Medication 2 SPRAY(S): at 13:11

## 2024-02-24 RX ADMIN — Medication 3 MILLILITER(S): at 20:05

## 2024-02-24 RX ADMIN — Medication 1 APPLICATION(S): at 18:25

## 2024-02-24 RX ADMIN — Medication 3 MILLILITER(S): at 08:39

## 2024-02-24 RX ADMIN — ENOXAPARIN SODIUM 50 MILLIGRAM(S): 100 INJECTION SUBCUTANEOUS at 07:12

## 2024-02-24 RX ADMIN — Medication 1 TABLET(S): at 07:12

## 2024-02-24 RX ADMIN — MEROPENEM 100 MILLIGRAM(S): 1 INJECTION INTRAVENOUS at 13:08

## 2024-02-24 RX ADMIN — PANTOPRAZOLE SODIUM 40 MILLIGRAM(S): 20 TABLET, DELAYED RELEASE ORAL at 07:13

## 2024-02-24 RX ADMIN — Medication 3 MILLILITER(S): at 08:28

## 2024-02-24 RX ADMIN — Medication 2 SPRAY(S): at 07:12

## 2024-02-24 RX ADMIN — Medication 2 SPRAY(S): at 21:02

## 2024-02-24 RX ADMIN — Medication 4 MILLILITER(S): at 08:28

## 2024-02-24 RX ADMIN — Medication 250 MILLIGRAM(S): at 18:25

## 2024-02-24 RX ADMIN — MIDODRINE HYDROCHLORIDE 20 MILLIGRAM(S): 2.5 TABLET ORAL at 07:12

## 2024-02-24 RX ADMIN — Medication 4 MILLILITER(S): at 20:05

## 2024-02-24 RX ADMIN — CASPOFUNGIN ACETATE 260 MILLIGRAM(S): 7 INJECTION, POWDER, LYOPHILIZED, FOR SOLUTION INTRAVENOUS at 12:16

## 2024-02-24 RX ADMIN — Medication 1 DROP(S): at 07:12

## 2024-02-24 RX ADMIN — ENOXAPARIN SODIUM 50 MILLIGRAM(S): 100 INJECTION SUBCUTANEOUS at 18:23

## 2024-02-24 RX ADMIN — Medication 1 APPLICATION(S): at 07:12

## 2024-02-24 RX ADMIN — Medication 250 MILLIGRAM(S): at 07:12

## 2024-02-24 RX ADMIN — CHLORHEXIDINE GLUCONATE 1 APPLICATION(S): 213 SOLUTION TOPICAL at 13:04

## 2024-02-24 RX ADMIN — SODIUM CHLORIDE 75 MILLILITER(S): 9 INJECTION, SOLUTION INTRAVENOUS at 12:21

## 2024-02-24 RX ADMIN — RALOXIFENE HYDROCHLORIDE 60 MILLIGRAM(S): 60 TABLET, COATED ORAL at 13:07

## 2024-02-24 RX ADMIN — LEVETIRACETAM 400 MILLIGRAM(S): 250 TABLET, FILM COATED ORAL at 08:37

## 2024-02-24 RX ADMIN — Medication 4 MILLILITER(S): at 15:29

## 2024-02-24 RX ADMIN — MEROPENEM 100 MILLIGRAM(S): 1 INJECTION INTRAVENOUS at 07:13

## 2024-02-24 RX ADMIN — Medication 3 MILLIGRAM(S): at 21:03

## 2024-02-24 RX ADMIN — GABAPENTIN 300 MILLIGRAM(S): 400 CAPSULE ORAL at 07:12

## 2024-02-24 RX ADMIN — LEVETIRACETAM 400 MILLIGRAM(S): 250 TABLET, FILM COATED ORAL at 18:26

## 2024-02-24 RX ADMIN — Medication 1 TABLET(S): at 18:25

## 2024-02-24 RX ADMIN — GABAPENTIN 300 MILLIGRAM(S): 400 CAPSULE ORAL at 18:25

## 2024-02-24 RX ADMIN — MEROPENEM 100 MILLIGRAM(S): 1 INJECTION INTRAVENOUS at 21:06

## 2024-02-24 RX ADMIN — MIDODRINE HYDROCHLORIDE 20 MILLIGRAM(S): 2.5 TABLET ORAL at 18:21

## 2024-02-24 NOTE — PROGRESS NOTE ADULT - ASSESSMENT
58F w/ PMHx Down Syndrome, nonverbal at baseline, Hypothyroidism, Cerebral palsy and Seizure Disorders [facility] ED [facility] presented to ED for respiratory distress and high grade fever. Patient found to be septic on admission. Admitted to SDU for management of acute respiratory distress 2/2 CAP/Aspiration PNA (20 Jan 2024 18:22)  While pt was on the floor she developed dyspnea after swallow evaluation, was spiking high grade fever, consulted by pulmonary/critical care and was upgraded back to SDU.    # Sepsis POA / Acute hypoxic resp failure / RSV bronchiolitis /  H/o Dysphagia/ suspected aspiration PNA /high grade fever   - off levophed   - continue midodrine   - continue caspo, danna  - ID following   - Failed FEES, put NG tube for feedings and medications, pt'll likely need PEG/trach when stable, consulted by GI   - supplement oxygen, monitor pulse Ox, on high flow oxygen for weeks   - c/w duoneb  - pulmonary is following, recommendations noted  - aggressive chest PT with vest, aspiration precautions and suction     # Elevated Troponin , type 2 MI/  Sinus tachycardia  - c/w telemetry monitoring   - Repeat EKG: Normal sinus rhythm  - c/w  metoprolol  -  maintain fluid balance   - TTE 2/19 normal EF no DD or valve abnormalities    # Seizure Disorder  - c/w  Keppra   - seizure precautions  - keep Mg above 2.0     # H/o lower ext DVT  - eliquis to lovenox     # Hypothyroidism  - Thyroid Stimulating Hormone, Serum: 0.51 uIU/mL (01.21.24 @ 06:34)    # Normocytic Anemia, anemia of chronic disease   - monitor H/H, keep Hb above 7.5     # Down Syndrome/  Cerebral Palsy  - supportive care  - prevent falls and aspiration   - failed speech and swallow, needs PEG   - on Raloxifene     #Supportive Management:  Dispo: sdu  DVT Ppx: enoxaparin Injectable 50 milliGRAM(s) SubCutaneous every 12 hours  GI Ppx: pantoprazole  Injectable 40 milliGRAM(s) IV Push every 24 hours  Diet:  Diet, NPO with Tube Feed:   Tube Feeding Modality: Nasogastric  Jevity 1.2 Juventino  Total Volume for 24 Hours (mL): 1200  Bolus  Total Volume of Bolus (mL):  300  Tube Feed Frequency: Every 6 hours   Tube Feed Start Time: 00:00   Bolus Feed Duration (in Hours): 1.5  Free Water Flush  Bolus   Total Volume per Flush (mL): 250   Frequency: Every 6 Hours  Free Water Flush Instructions:  75mL water flush pre and then post feeds. Keep HOB >45 degress during feeds  No Carb Prosource TF     Qty per Day:  1 (02-12-24 @ 08:30) [Active]

## 2024-02-24 NOTE — PROGRESS NOTE ADULT - ASSESSMENT
IMPRESSION:    Acute hypoxemic respiratory failure  Likely aspiration pneumonia   Sepsis POA  Septic shock off Levophed   Recurrent aspiration pneumonia / prior intubation  HO DVT on Eliquis   HO GI bleed  HO OM  HO recent duodenal perforation   HO polymicrobial bacteremia   H/o CP, DS  H/o seizures    PLAN:    CNS:  Avoid CNS Depressant, AED. MS at baseline.    HEENT: Oral care.     PULMONARY: HOB at 45 degrees.  Aspiration precaution. Wean FiO2 Keep spo2 more than 92%.  CXR noted. Daily CXR for now. Aggressive pulm toilet, frequent suctioning.     CARDIOVASCULAR: Keep MAP more than 60. Avoid overload. C/w midodrine 20mg.  Goal directed fluid resuscitation. Off levophed.     GI: Protonix. NG feeding.  Might need PEG when more stable    INFECTIOUS DISEASE:  ABX and Anti fungal per ID.  DW ID.  Doubt Tb.     HEMATOLOGICAL:  Lovenox therapeutic.  Monitor CBC    ENDOCRINE:  Follow up FS.  Insulin protocol if needed.    MUSCULOSKELETAL: Bedrest.  Off loading.  Wound care.    Prognosis very poor.    Palliative care following    SDU.

## 2024-02-24 NOTE — PROGRESS NOTE ADULT - SUBJECTIVE AND OBJECTIVE BOX
SUBJECTIVE:    Patient is a 58y old Female who presents with a chief complaint of Respiratory Distress (24 Feb 2024 09:37)    Currently admitted to medicine with the primary diagnosis of:    Today is hospital day 35d.     Overnight Events:     no acute overnight events. off pressor     PAST MEDICAL & SURGICAL HISTORY  Down syndrome    Osteoporosis    Mild anemia    Neuropathy    S/P debridement  of R hip on 3/2/21       ALLERGIES:  No Known Allergies    MEDICATIONS:  STANDING MEDICATIONS  acetylcysteine 20%  Inhalation 4 milliLiter(s) Inhalation every 6 hours  albuterol/ipratropium for Nebulization 3 milliLiter(s) Nebulizer every 6 hours  AQUAPHOR (petrolatum Ointment) 1 Application(s) Topical two times a day  artificial  tears Solution 1 Drop(s) Both EYES two times a day  calcium carbonate   1250 mG (OsCal) 1 Tablet(s) Oral two times a day  caspofungin IVPB 50 milliGRAM(s) IV Intermittent every 24 hours  caspofungin IVPB      chlorhexidine 2% Cloths 1 Application(s) Topical daily  dextrose 5% + lactated ringers. 1000 milliLiter(s) IV Continuous <Continuous>  enoxaparin Injectable 50 milliGRAM(s) SubCutaneous every 12 hours  gabapentin 300 milliGRAM(s) Oral every 12 hours  levETIRAcetam  IVPB 500 milliGRAM(s) IV Intermittent two times a day  melatonin 3 milliGRAM(s) Oral at bedtime  meropenem  IVPB 1000 milliGRAM(s) IV Intermittent every 8 hours  midodrine. 20 milliGRAM(s) Oral three times a day  pantoprazole  Injectable 40 milliGRAM(s) IV Push every 24 hours  polyethylene glycol 3350 17 Gram(s) Oral at bedtime  raloxifene 60 milliGRAM(s) Oral daily  saccharomyces boulardii 250 milliGRAM(s) Oral two times a day  senna 2 Tablet(s) Oral at bedtime  sodium chloride 0.65% Nasal 2 Spray(s) Both Nostrils three times a day    PRN MEDICATIONS  aluminum hydroxide/magnesium hydroxide/simethicone Suspension 30 milliLiter(s) Oral every 4 hours PRN  ondansetron Injectable 4 milliGRAM(s) IV Push every 8 hours PRN    VITALS:   ICU Vital Signs Last 24 Hrs  T(C): 36.5 (24 Feb 2024 12:28), Max: 37.1 (23 Feb 2024 16:32)  T(F): 97.7 (24 Feb 2024 12:28), Max: 98.7 (23 Feb 2024 16:32)  HR: 100 (24 Feb 2024 12:28) (90 - 108)  BP: 97/56 (24 Feb 2024 12:28) (97/56 - 111/53)  BP(mean): 73 (24 Feb 2024 12:28) (73 - 77)     RR: 20 (24 Feb 2024 12:28) (18 - 20)  SpO2: 98% (24 Feb 2024 12:28) (92% - 98%)    O2 Parameters below as of 24 Feb 2024 12:28  Patient On (Oxygen Delivery Method): nasal cannula, high flow            LABS:                        8.2    8.40  )-----------( 530      ( 24 Feb 2024 06:16 )             27.0     02-24    142  |  102  |  14  ----------------------------<  154<H>  3.8   |  31  |  0.5<L>    Ca    8.6      24 Feb 2024 06:16  Mg     2.3     02-24    TPro  6.1  /  Alb  2.4<L>  /  TBili  <0.2  /  DBili  x   /  AST  17  /  ALT  18  /  AlkPhos  81  02-24      Urinalysis Basic - ( 24 Feb 2024 06:16 )    Color: x / Appearance: x / SG: x / pH: x  Gluc: 154 mg/dL / Ketone: x  / Bili: x / Urobili: x   Blood: x / Protein: x / Nitrite: x   Leuk Esterase: x / RBC: x / WBC x   Sq Epi: x / Non Sq Epi: x / Bacteria: x                  RADIOLOGY:    PHYSICAL EXAM:    GENERAL: NAD, lying in bed comfortably  CHEST/LUNG: Clear to auscultation bilaterally; No rales, rhonchi, wheezing, or rubs. Unlabored respirations  HEART: systolic mitral murmur  ABDOMEN: ngt in place Bowel sounds present; Soft, Nontender, Nondistended. No hepatomegally  EXTREMITIES:  noedema  NERVOUS SYSTEM:  Alert & Oriented X0

## 2024-02-24 NOTE — PROGRESS NOTE ADULT - SUBJECTIVE AND OBJECTIVE BOX
Patient is a 58y old  Female who presents with a chief complaint of Respiratory Distress (23 Feb 2024 15:32)        Over Night Events:  Remains on HFNC 50/60. Off pressors.       ROS:     All ROS are negative except HPI         PHYSICAL EXAM    ICU Vital Signs Last 24 Hrs  T(C): 36.6 (24 Feb 2024 08:54), Max: 38.4 (23 Feb 2024 12:00)  T(F): 97.9 (24 Feb 2024 08:54), Max: 101.2 (23 Feb 2024 12:00)  HR: 106 (24 Feb 2024 08:54) (90 - 123)  BP: 108/63 (24 Feb 2024 08:54) (101/58 - 111/53)  BP(mean): 77 (24 Feb 2024 08:54) (75 - 77)  ABP: --  ABP(mean): --  RR: 20 (24 Feb 2024 08:54) (18 - 20)  SpO2: 96% (24 Feb 2024 08:54) (92% - 97%)    O2 Parameters below as of 24 Feb 2024 08:54  Patient On (Oxygen Delivery Method): nasal cannula, high flow            CONSTITUTIONAL:  ill appearing in NAD    ENT:   Airway patent,   Mouth with normal mucosa.     EYES:   Pupils equal,   Round and reactive to light.    CARDIAC:   Tachycardia   No edema    RESPIRATORY:   Decreased bilateral BS  Normal chest expansion  Not tachypneic,  No use of accessory muscles    GASTROINTESTINAL:  Abdomen soft,   Non-tender,   No guarding,     MUSCULOSKELETAL:   Range of motion is limited,  No clubbing, cyanosis    NEUROLOGICAL:   Alert   Does not follow commands    SKIN:   Skin normal color for race,   Warm and dry and intact.     02-23-24 @ 07:01  -  02-24-24 @ 07:00  --------------------------------------------------------  IN:    dextrose 5% + lactated ringers: 900 mL    Enteral Tube Flush: 150 mL    IV PiggyBack: 50 mL    Jevity 1.2: 900 mL  Total IN: 2000 mL    OUT:    Voided (mL): 400 mL  Total OUT: 400 mL    Total NET: 1600 mL          LABS:                            8.2    8.40  )-----------( 530      ( 24 Feb 2024 06:16 )             27.0                                               02-24    142  |  102  |  14  ----------------------------<  154<H>  3.8   |  31  |  0.5<L>    Ca    8.6      24 Feb 2024 06:16  Mg     2.3     02-24    TPro  6.1  /  Alb  2.4<L>  /  TBili  <0.2  /  DBili  x   /  AST  17  /  ALT  18  /  AlkPhos  81  02-24                                             Urinalysis Basic - ( 24 Feb 2024 06:16 )    Color: x / Appearance: x / SG: x / pH: x  Gluc: 154 mg/dL / Ketone: x  / Bili: x / Urobili: x   Blood: x / Protein: x / Nitrite: x   Leuk Esterase: x / RBC: x / WBC x   Sq Epi: x / Non Sq Epi: x / Bacteria: x                                                  LIVER FUNCTIONS - ( 24 Feb 2024 06:16 )  Alb: 2.4 g/dL / Pro: 6.1 g/dL / ALK PHOS: 81 U/L / ALT: 18 U/L / AST: 17 U/L / GGT: x                                                                                                                                       MEDICATIONS  (STANDING):  acetylcysteine 20%  Inhalation 4 milliLiter(s) Inhalation every 6 hours  albuterol/ipratropium for Nebulization 3 milliLiter(s) Nebulizer every 6 hours  AQUAPHOR (petrolatum Ointment) 1 Application(s) Topical two times a day  artificial  tears Solution 1 Drop(s) Both EYES two times a day  calcium carbonate   1250 mG (OsCal) 1 Tablet(s) Oral two times a day  caspofungin IVPB 50 milliGRAM(s) IV Intermittent every 24 hours  caspofungin IVPB      chlorhexidine 2% Cloths 1 Application(s) Topical daily  dextrose 5% + lactated ringers. 1000 milliLiter(s) (75 mL/Hr) IV Continuous <Continuous>  enoxaparin Injectable 50 milliGRAM(s) SubCutaneous every 12 hours  gabapentin 300 milliGRAM(s) Oral every 12 hours  levETIRAcetam  IVPB 500 milliGRAM(s) IV Intermittent two times a day  melatonin 3 milliGRAM(s) Oral at bedtime  meropenem  IVPB 1000 milliGRAM(s) IV Intermittent every 8 hours  midodrine. 20 milliGRAM(s) Oral three times a day  norepinephrine Infusion 0.05 MICROgram(s)/kG/Min (4.9 mL/Hr) IV Continuous <Continuous>  pantoprazole  Injectable 40 milliGRAM(s) IV Push every 24 hours  polyethylene glycol 3350 17 Gram(s) Oral at bedtime  raloxifene 60 milliGRAM(s) Oral daily  saccharomyces boulardii 250 milliGRAM(s) Oral two times a day  senna 2 Tablet(s) Oral at bedtime  sodium chloride 0.65% Nasal 2 Spray(s) Both Nostrils three times a day    MEDICATIONS  (PRN):  aluminum hydroxide/magnesium hydroxide/simethicone Suspension 30 milliLiter(s) Oral every 4 hours PRN Dyspepsia  ondansetron Injectable 4 milliGRAM(s) IV Push every 8 hours PRN Nausea and/or Vomiting      New X-rays reviewed:                                                                                  ECHO

## 2024-02-25 LAB
ALBUMIN SERPL ELPH-MCNC: 2.5 G/DL — LOW (ref 3.5–5.2)
ALP SERPL-CCNC: 97 U/L — SIGNIFICANT CHANGE UP (ref 30–115)
ALT FLD-CCNC: 17 U/L — SIGNIFICANT CHANGE UP (ref 0–41)
ANION GAP SERPL CALC-SCNC: 13 MMOL/L — SIGNIFICANT CHANGE UP (ref 7–14)
AST SERPL-CCNC: 20 U/L — SIGNIFICANT CHANGE UP (ref 0–41)
BASOPHILS # BLD AUTO: 0.09 K/UL — SIGNIFICANT CHANGE UP (ref 0–0.2)
BASOPHILS NFR BLD AUTO: 1 % — SIGNIFICANT CHANGE UP (ref 0–1)
BILIRUB SERPL-MCNC: <0.2 MG/DL — SIGNIFICANT CHANGE UP (ref 0.2–1.2)
BUN SERPL-MCNC: 15 MG/DL — SIGNIFICANT CHANGE UP (ref 10–20)
CALCIUM SERPL-MCNC: 8.5 MG/DL — SIGNIFICANT CHANGE UP (ref 8.4–10.5)
CHLORIDE SERPL-SCNC: 101 MMOL/L — SIGNIFICANT CHANGE UP (ref 98–110)
CO2 SERPL-SCNC: 27 MMOL/L — SIGNIFICANT CHANGE UP (ref 17–32)
CREAT SERPL-MCNC: <0.5 MG/DL — LOW (ref 0.7–1.5)
EGFR: 115 ML/MIN/1.73M2 — SIGNIFICANT CHANGE UP
EOSINOPHIL # BLD AUTO: 0.7 K/UL — SIGNIFICANT CHANGE UP (ref 0–0.7)
EOSINOPHIL NFR BLD AUTO: 7.8 % — SIGNIFICANT CHANGE UP (ref 0–8)
GLUCOSE SERPL-MCNC: 137 MG/DL — HIGH (ref 70–99)
HCT VFR BLD CALC: 28.4 % — LOW (ref 37–47)
HGB BLD-MCNC: 8.4 G/DL — LOW (ref 12–16)
IMM GRANULOCYTES NFR BLD AUTO: 0.4 % — HIGH (ref 0.1–0.3)
LYMPHOCYTES # BLD AUTO: 1.8 K/UL — SIGNIFICANT CHANGE UP (ref 1.2–3.4)
LYMPHOCYTES # BLD AUTO: 19.9 % — LOW (ref 20.5–51.1)
MAGNESIUM SERPL-MCNC: 2.4 MG/DL — SIGNIFICANT CHANGE UP (ref 1.8–2.4)
MCHC RBC-ENTMCNC: 23.6 PG — LOW (ref 27–31)
MCHC RBC-ENTMCNC: 29.6 G/DL — LOW (ref 32–37)
MCV RBC AUTO: 79.8 FL — LOW (ref 81–99)
MONOCYTES # BLD AUTO: 0.63 K/UL — HIGH (ref 0.1–0.6)
MONOCYTES NFR BLD AUTO: 7 % — SIGNIFICANT CHANGE UP (ref 1.7–9.3)
NEUTROPHILS # BLD AUTO: 5.77 K/UL — SIGNIFICANT CHANGE UP (ref 1.4–6.5)
NEUTROPHILS NFR BLD AUTO: 63.9 % — SIGNIFICANT CHANGE UP (ref 42.2–75.2)
NRBC # BLD: 0 /100 WBCS — SIGNIFICANT CHANGE UP (ref 0–0)
PLATELET # BLD AUTO: 614 K/UL — HIGH (ref 130–400)
PMV BLD: 10.2 FL — SIGNIFICANT CHANGE UP (ref 7.4–10.4)
POTASSIUM SERPL-MCNC: 4.6 MMOL/L — SIGNIFICANT CHANGE UP (ref 3.5–5)
POTASSIUM SERPL-SCNC: 4.6 MMOL/L — SIGNIFICANT CHANGE UP (ref 3.5–5)
PROT SERPL-MCNC: 6 G/DL — SIGNIFICANT CHANGE UP (ref 6–8)
RBC # BLD: 3.56 M/UL — LOW (ref 4.2–5.4)
RBC # FLD: 21.4 % — HIGH (ref 11.5–14.5)
SODIUM SERPL-SCNC: 141 MMOL/L — SIGNIFICANT CHANGE UP (ref 135–146)
WBC # BLD: 9.03 K/UL — SIGNIFICANT CHANGE UP (ref 4.8–10.8)
WBC # FLD AUTO: 9.03 K/UL — SIGNIFICANT CHANGE UP (ref 4.8–10.8)

## 2024-02-25 PROCEDURE — 99233 SBSQ HOSP IP/OBS HIGH 50: CPT

## 2024-02-25 PROCEDURE — 71045 X-RAY EXAM CHEST 1 VIEW: CPT | Mod: 26

## 2024-02-25 PROCEDURE — 99232 SBSQ HOSP IP/OBS MODERATE 35: CPT

## 2024-02-25 RX ORDER — CHLORHEXIDINE GLUCONATE 213 G/1000ML
1 SOLUTION TOPICAL DAILY
Refills: 0 | Status: DISCONTINUED | OUTPATIENT
Start: 2024-02-25 | End: 2024-03-21

## 2024-02-25 RX ORDER — MEROPENEM 1 G/30ML
1000 INJECTION INTRAVENOUS EVERY 8 HOURS
Refills: 0 | Status: COMPLETED | OUTPATIENT
Start: 2024-02-25 | End: 2024-03-03

## 2024-02-25 RX ORDER — MEROPENEM 1 G/30ML
1000 INJECTION INTRAVENOUS ONCE
Refills: 0 | Status: COMPLETED | OUTPATIENT
Start: 2024-02-25 | End: 2024-02-25

## 2024-02-25 RX ORDER — MEROPENEM 1 G/30ML
INJECTION INTRAVENOUS
Refills: 0 | Status: COMPLETED | OUTPATIENT
Start: 2024-02-25 | End: 2024-03-03

## 2024-02-25 RX ADMIN — Medication 1 TABLET(S): at 05:33

## 2024-02-25 RX ADMIN — Medication 3 MILLILITER(S): at 20:05

## 2024-02-25 RX ADMIN — LEVETIRACETAM 400 MILLIGRAM(S): 250 TABLET, FILM COATED ORAL at 05:30

## 2024-02-25 RX ADMIN — MIDODRINE HYDROCHLORIDE 20 MILLIGRAM(S): 2.5 TABLET ORAL at 17:53

## 2024-02-25 RX ADMIN — MIDODRINE HYDROCHLORIDE 20 MILLIGRAM(S): 2.5 TABLET ORAL at 05:33

## 2024-02-25 RX ADMIN — LEVETIRACETAM 400 MILLIGRAM(S): 250 TABLET, FILM COATED ORAL at 17:55

## 2024-02-25 RX ADMIN — Medication 1 DROP(S): at 06:31

## 2024-02-25 RX ADMIN — MEROPENEM 100 MILLIGRAM(S): 1 INJECTION INTRAVENOUS at 05:44

## 2024-02-25 RX ADMIN — MIDODRINE HYDROCHLORIDE 20 MILLIGRAM(S): 2.5 TABLET ORAL at 12:15

## 2024-02-25 RX ADMIN — Medication 4 MILLILITER(S): at 20:05

## 2024-02-25 RX ADMIN — Medication 4 MILLILITER(S): at 08:35

## 2024-02-25 RX ADMIN — Medication 2 SPRAY(S): at 05:43

## 2024-02-25 RX ADMIN — ENOXAPARIN SODIUM 50 MILLIGRAM(S): 100 INJECTION SUBCUTANEOUS at 17:54

## 2024-02-25 RX ADMIN — Medication 4 MILLILITER(S): at 14:04

## 2024-02-25 RX ADMIN — Medication 1 TABLET(S): at 17:54

## 2024-02-25 RX ADMIN — CASPOFUNGIN ACETATE 260 MILLIGRAM(S): 7 INJECTION, POWDER, LYOPHILIZED, FOR SOLUTION INTRAVENOUS at 09:13

## 2024-02-25 RX ADMIN — Medication 3 MILLIGRAM(S): at 21:37

## 2024-02-25 RX ADMIN — Medication 1 DROP(S): at 17:55

## 2024-02-25 RX ADMIN — GABAPENTIN 300 MILLIGRAM(S): 400 CAPSULE ORAL at 05:33

## 2024-02-25 RX ADMIN — Medication 1 APPLICATION(S): at 17:56

## 2024-02-25 RX ADMIN — Medication 3 MILLILITER(S): at 08:35

## 2024-02-25 RX ADMIN — Medication 250 MILLIGRAM(S): at 05:34

## 2024-02-25 RX ADMIN — Medication 2 SPRAY(S): at 21:38

## 2024-02-25 RX ADMIN — Medication 2 SPRAY(S): at 13:50

## 2024-02-25 RX ADMIN — ENOXAPARIN SODIUM 50 MILLIGRAM(S): 100 INJECTION SUBCUTANEOUS at 05:34

## 2024-02-25 RX ADMIN — CHLORHEXIDINE GLUCONATE 1 APPLICATION(S): 213 SOLUTION TOPICAL at 12:15

## 2024-02-25 RX ADMIN — RALOXIFENE HYDROCHLORIDE 60 MILLIGRAM(S): 60 TABLET, COATED ORAL at 12:16

## 2024-02-25 RX ADMIN — Medication 250 MILLIGRAM(S): at 17:55

## 2024-02-25 RX ADMIN — MEROPENEM 100 MILLIGRAM(S): 1 INJECTION INTRAVENOUS at 12:18

## 2024-02-25 RX ADMIN — GABAPENTIN 300 MILLIGRAM(S): 400 CAPSULE ORAL at 17:53

## 2024-02-25 RX ADMIN — Medication 1 APPLICATION(S): at 05:43

## 2024-02-25 RX ADMIN — SENNA PLUS 2 TABLET(S): 8.6 TABLET ORAL at 21:37

## 2024-02-25 RX ADMIN — Medication 3 MILLILITER(S): at 14:05

## 2024-02-25 RX ADMIN — PANTOPRAZOLE SODIUM 40 MILLIGRAM(S): 20 TABLET, DELAYED RELEASE ORAL at 05:34

## 2024-02-25 RX ADMIN — MEROPENEM 100 MILLIGRAM(S): 1 INJECTION INTRAVENOUS at 21:38

## 2024-02-25 NOTE — PROGRESS NOTE ADULT - SUBJECTIVE AND OBJECTIVE BOX
Hospital Day:  36d    Subjective:    Patient is a 58y old  Female who presents with a chief complaint of Respiratory Distress (25 Feb 2024 08:00)    This morning patient is lying in bed comfortably. Nurse reports no events overnight      Past Medical Hx:   Down syndrome    Osteoporosis    Mild anemia    Neuropathy      Past Sx:  No significant past surgical history    S/P debridement      Allergies:  No Known Allergies    Current Meds:   Standng Meds:  acetylcysteine 20%  Inhalation 4 milliLiter(s) Inhalation every 6 hours  albuterol/ipratropium for Nebulization 3 milliLiter(s) Nebulizer every 6 hours  AQUAPHOR (petrolatum Ointment) 1 Application(s) Topical two times a day  artificial  tears Solution 1 Drop(s) Both EYES two times a day  calcium carbonate   1250 mG (OsCal) 1 Tablet(s) Oral two times a day  caspofungin IVPB 50 milliGRAM(s) IV Intermittent every 24 hours  caspofungin IVPB      chlorhexidine 2% Cloths 1 Application(s) Topical daily  dextrose 5% + lactated ringers. 1000 milliLiter(s) (75 mL/Hr) IV Continuous <Continuous>  enoxaparin Injectable 50 milliGRAM(s) SubCutaneous every 12 hours  gabapentin 300 milliGRAM(s) Oral every 12 hours  levETIRAcetam  IVPB 500 milliGRAM(s) IV Intermittent two times a day  melatonin 3 milliGRAM(s) Oral at bedtime  meropenem  IVPB      midodrine. 20 milliGRAM(s) Oral three times a day  pantoprazole  Injectable 40 milliGRAM(s) IV Push every 24 hours  polyethylene glycol 3350 17 Gram(s) Oral at bedtime  raloxifene 60 milliGRAM(s) Oral daily  saccharomyces boulardii 250 milliGRAM(s) Oral two times a day  senna 2 Tablet(s) Oral at bedtime  sodium chloride 0.65% Nasal 2 Spray(s) Both Nostrils three times a day    PRN Meds:  aluminum hydroxide/magnesium hydroxide/simethicone Suspension 30 milliLiter(s) Oral every 4 hours PRN Dyspepsia  ondansetron Injectable 4 milliGRAM(s) IV Push every 8 hours PRN Nausea and/or Vomiting    HOME MEDICATIONS:  docusate sodium 100 mg oral capsule: 1 cap(s) orally 2 times a day  gabapentin 300 mg oral tablet: 1 cap(s) orally every 12 hours  Melatonin 3 mg oral tablet: 1 tab(s) orally once a day (at bedtime)  Oyster Shell 500 (1250 mg calcium carbonate) oral tablet: 1 tab(s) orally 2 times a day  Protonix 40 mg oral delayed release tablet: 1 tab(s) orally once a day (after a meal)  raloxifene 60 mg oral tablet: 1 tab(s) orally once a day      Vital Signs:   T(F): 97.8 (02-25-24 @ 08:32), Max: 97.8 (02-25-24 @ 08:32)  HR: 97 (02-25-24 @ 08:32) (61 - 100)  BP: 118/75 (02-25-24 @ 08:32) (96/67 - 118/75)  RR: 19 (02-25-24 @ 08:32) (18 - 20)  SpO2: 98% (02-25-24 @ 08:32) (91% - 100%)      02-24-24 @ 07:01  -  02-25-24 @ 07:00  --------------------------------------------------------  IN: 2550 mL / OUT: 600 mL / NET: 1950 mL        Physical Exam:   GENERAL: NAD  HEENT: NCAT  CHEST/LUNG: CTAB  HEART: Regular rate and rhythm; s1 s2 appreciated, No murmurs, rubs, or gallops  ABDOMEN: Soft, Nontender, Nondistended; Bowel sounds present  EXTREMITIES: No LE edema b/l  SKIN: no rashes, no new lesions  NERVOUS SYSTEM:  Alert        Labs:                         8.4    9.03  )-----------( 614      ( 25 Feb 2024 06:56 )             28.4     Neutophil% 63.9, Lymphocyte% 19.9, Monocyte% 7.0, Bands% 0.4 02-25-24 @ 06:56    25 Feb 2024 06:56    141    |  101    |  15     ----------------------------<  137    4.6     |  27     |  <0.5     Ca    8.5        25 Feb 2024 06:56  Mg     2.4       25 Feb 2024 06:56    TPro  6.0    /  Alb  2.5    /  TBili  <0.2   /  DBili  x      /  AST  20     /  ALT  17     /  AlkPhos  97     25 Feb 2024 06:56                    Urinalysis Basic - ( 25 Feb 2024 06:56 )    Color: x / Appearance: x / SG: x / pH: x  Gluc: 137 mg/dL / Ketone: x  / Bili: x / Urobili: x   Blood: x / Protein: x / Nitrite: x   Leuk Esterase: x / RBC: x / WBC x   Sq Epi: x / Non Sq Epi: x / Bacteria: x          Culture - Blood (collected 02-19-24 @ 11:23)  Source: .Blood None  Final Report (02-24-24 @ 20:01):    No growth at 5 days            Assessment and Plan:

## 2024-02-25 NOTE — PROGRESS NOTE ADULT - ASSESSMENT
58F w/ PMHx Down Syndrome, nonverbal at baseline, Hypothyroidism, Cerebral palsy and Seizure Disorders presents [facility] the ED [facility] presented to ED for respiratory distress and high grade fever. Patient found to be septic on admission. Admitted to SDU for management of acute respiratory distress 2/2 CAP/Aspiration PNA (20 Jan 2024 18:22)  While pt was on the floor she developed dyspnea after swallow evaluation, was spiking high grade fever, consulted by pulmonary/critical care and was upgraded back to SDU.    # Sepsis POA / Acute hypoxic resp failure / RSV bronchiolitis /  H/o Dysphagia/ suspected aspiration PNA /high grade fever   - off levophed   - continue midodrine   - continue caspo, danna  - ID following   - Failed FEES, put NG tube for feedings and medications, pt'll likely need PEG/trach when stable, consulted by GI   - supplement oxygen, monitor pulse Ox, on high flow oxygen for weeks   - c/w duoneb  - pulmonary is following, recommendations noted  - aggressive chest PT with vest, aspiration precautions and suction     # Elevated Troponin , type 2 MI/  Sinus tachycardia  - c/w telemetry monitoring   - Repeat EKG: Normal sinus rhythm  - c/w  metoprolol  -  maintain fluid balance   - TTE 2/19 normal EF no DD or valve abnormalities    # Seizure Disorder  - c/w  Keppra   - seizure precautions  - keep Mg above 2.0     # H/o lower ext DVT  - eliquis to lovenox     # Hypothyroidism  - Thyroid Stimulating Hormone, Serum: 0.51 uIU/mL (01.21.24 @ 06:34)    # Normocytic Anemia, anemia of chronic disease   - monitor H/H, keep Hb above 7.5     # Down Syndrome/  Cerebral Palsy  - supportive care  - prevent falls and aspiration   - failed speech and swallow, needs PEG   - on Raloxifene     #Supportive Management:  Dispo: sdu  DVT Ppx: enoxaparin Injectable 50 milliGRAM(s) SubCutaneous every 12 hours  GI Ppx: pantoprazole  Injectable 40 milliGRAM(s) IV Push every 24 hours  Diet:  Diet, NPO with Tube Feed:   Tube Feeding Modality: Nasogastric  Jevity 1.2 Juventino      # Full code  - conversation started with patient's representative and progress note faxed to start considering DNR/DNI. for now full code.   La Nena Vasquez [phone]

## 2024-02-25 NOTE — PROGRESS NOTE ADULT - ATTENDING COMMENTS
IMPRESSION:    Acute hypoxemic respiratory failure  Likely aspiration pneumonia   Sepsis POA  Septic shock off Levophed   Recurrent aspiration pneumonia / prior intubation  HO DVT on Eliquis   HO GI bleed  HO OM  HO recent duodenal perforation   HO polymicrobial bacteremia   H/o CP, DS  H/o seizures    Plan as outlined above  keep SDU for today   keep pox >92%

## 2024-02-25 NOTE — PROGRESS NOTE ADULT - ASSESSMENT
58F w/ PMHx Down Syndrome, nonverbal at baseline, Hypothyroidism, Cerebral palsy and Seizure Disorders presents to the ED from nursing facility[facility] ([facility]) presented to ED for respiratory distress and high grade fever. Patient found to be septic on admission. Admitted to SDU for management of acute respiratory distress 2/2 CAP/Aspiration PNA (20 Jan 2024 18:22)  While pt was on the floor she developed dyspnea after swallow evaluation, was spiking high grade fever, consulted by pulmonary/critical care and was upgraded back to SDU.    # Sepsis POA / Acute hypoxic resp failure / RSV bronchiolitis /  H/o Dysphagia/ suspected aspiration PNA /high grade fever   - off levophed   - continue midodrine   - continue caspo, danna  - ID following   - Failed FEES, put NG tube for feedings and medications, pt'll likely need PEG/trach when stable, consulted by GI   - supplement oxygen, monitor pulse Ox, on high flow oxygen for weeks   - c/w duoneb  - pulmonary is following, recommendations noted  - aggressive chest PT with vest, aspiration precautions and suction     # Elevated Troponin , type 2 MI/  Sinus tachycardia  - c/w telemetry monitoring   - Repeat EKG: Normal sinus rhythm  - c/w  metoprolol  -  maintain fluid balance   - TTE 2/19 normal EF no DD or valve abnormalities    # Seizure Disorder  - c/w  Keppra   - seizure precautions  - keep Mg above 2.0     # H/o lower ext DVT  - eliquis to lovenox     # Hypothyroidism  - Thyroid Stimulating Hormone, Serum: 0.51 uIU/mL (01.21.24 @ 06:34)    # Normocytic Anemia, anemia of chronic disease   - monitor H/H, keep Hb above 7.5     # Down Syndrome/  Cerebral Palsy  - supportive care  - prevent falls and aspiration   - failed speech and swallow, needs PEG   - on Raloxifene     #Supportive Management:  Dispo: sdu  DVT Ppx: enoxaparin Injectable 50 milliGRAM(s) SubCutaneous every 12 hours  GI Ppx: pantoprazole  Injectable 40 milliGRAM(s) IV Push every 24 hours  Diet:  Diet, NPO with Tube Feed:   Tube Feeding Modality: Nasogastric  Jevity 1.2 Juventino

## 2024-02-25 NOTE — PROGRESS NOTE ADULT - SUBJECTIVE AND OBJECTIVE BOX
24H events:    Patient is a 58y old Female who presents with a chief complaint of Respiratory Distress (25 Feb 2024 10:28)    Primary diagnosis of Sepsis with acute hypoxic respiratory failure    Today is hospital day 36d. This morning patient was seen and examined at bedside, resting comfortably in bed.    No acute or major events overnight. Hemodynamically stable, tolerating oral diet, voiding appropriately with appropriate bowel movements.     Code Status:    Family communication:  Contact date:  Name of person contacted:  Relationship to patient:  Communication details:  What matters most:    PAST MEDICAL & SURGICAL HISTORY  Down syndrome    Osteoporosis    Mild anemia    Neuropathy    S/P debridement  of R hip on 3/2/21      SOCIAL HISTORY:  Social History:      ALLERGIES:  No Known Allergies    MEDICATIONS:  STANDING MEDICATIONS  acetylcysteine 20%  Inhalation 4 milliLiter(s) Inhalation every 6 hours  albuterol/ipratropium for Nebulization 3 milliLiter(s) Nebulizer every 6 hours  AQUAPHOR (petrolatum Ointment) 1 Application(s) Topical two times a day  artificial  tears Solution 1 Drop(s) Both EYES two times a day  calcium carbonate   1250 mG (OsCal) 1 Tablet(s) Oral two times a day  caspofungin IVPB 50 milliGRAM(s) IV Intermittent every 24 hours  caspofungin IVPB      chlorhexidine 2% Cloths 1 Application(s) Topical daily  dextrose 5% + lactated ringers. 1000 milliLiter(s) IV Continuous <Continuous>  enoxaparin Injectable 50 milliGRAM(s) SubCutaneous every 12 hours  gabapentin 300 milliGRAM(s) Oral every 12 hours  levETIRAcetam  IVPB 500 milliGRAM(s) IV Intermittent two times a day  melatonin 3 milliGRAM(s) Oral at bedtime  meropenem  IVPB      midodrine. 20 milliGRAM(s) Oral three times a day  pantoprazole  Injectable 40 milliGRAM(s) IV Push every 24 hours  polyethylene glycol 3350 17 Gram(s) Oral at bedtime  raloxifene 60 milliGRAM(s) Oral daily  saccharomyces boulardii 250 milliGRAM(s) Oral two times a day  senna 2 Tablet(s) Oral at bedtime  sodium chloride 0.65% Nasal 2 Spray(s) Both Nostrils three times a day    PRN MEDICATIONS  aluminum hydroxide/magnesium hydroxide/simethicone Suspension 30 milliLiter(s) Oral every 4 hours PRN  ondansetron Injectable 4 milliGRAM(s) IV Push every 8 hours PRN    VITALS:   T(F): 97.8  HR: 97  BP: 118/75  RR: 19  SpO2: 98%    PHYSICAL EXAM:  Gen: NAD, resting in bed  HEENT: Normocephalic, atraumatic  Neck: supple, no lymphadenopathy  CV: Regular rate & regular rhythm  Lungs: decrease bs b/l, no wheeze, on HFNC  Abdomen: Soft, NTND+ BS present, NGT  Ext: Warm, well perfused no CCE  Neuro: non focal, awake, does not follow commands  Skin: no rash, no erythema    (  ) Indwelling Madsen Catheter:   Date insterted:    Reason (  ) Critical illness     (  ) urinary retention    (  ) Accurate Ins/Outs Monitoring     (  ) CMO patient    (  ) Central Line:   Date inserted:  Location: (  ) Right IJ     (  ) Left IJ     (  ) Right Fem     (  ) Left Fem    (  ) SPC        (  ) pigtail       (  ) PEG tube       (  ) colostomy       (  ) jejunostomy  (  ) U-Dall    LABS:                        8.4    9.03  )-----------( 614      ( 25 Feb 2024 06:56 )             28.4     02-25    141  |  101  |  15  ----------------------------<  137<H>  4.6   |  27  |  <0.5<L>    Ca    8.5      25 Feb 2024 06:56  Mg     2.4     02-25    TPro  6.0  /  Alb  2.5<L>  /  TBili  <0.2  /  DBili  x   /  AST  20  /  ALT  17  /  AlkPhos  97  02-25      Urinalysis Basic - ( 25 Feb 2024 06:56 )    Color: x / Appearance: x / SG: x / pH: x  Gluc: 137 mg/dL / Ketone: x  / Bili: x / Urobili: x   Blood: x / Protein: x / Nitrite: x   Leuk Esterase: x / RBC: x / WBC x   Sq Epi: x / Non Sq Epi: x / Bacteria: x                RADIOLOGY:    RADIOLOGY

## 2024-02-25 NOTE — PROGRESS NOTE ADULT - ASSESSMENT
IMPRESSION:    Acute hypoxemic respiratory failure  Likely aspiration pneumonia   Sepsis POA  Septic shock off Levophed   Recurrent aspiration pneumonia / prior intubation  HO DVT on Eliquis   HO GI bleed  HO OM  HO recent duodenal perforation   HO polymicrobial bacteremia   H/o CP, DS  H/o seizures    PLAN:    CNS:  Avoid CNS Depressant, AED. MS at baseline.    HEENT: Oral care.     PULMONARY: HOB at 45 degrees.  Aspiration precaution. Wean FiO2 Keep spo2 more than 92%.  CXR noted. Daily CXR for now. Aggressive pulm toilet, frequent suctioning.     CARDIOVASCULAR: Keep MAP more than 60. Avoid overload. C/w midodrine 20mg.  Goal directed fluid resuscitation. Off levophed.     GI: Protonix. NG feeding.  Might need PEG when more stable    INFECTIOUS DISEASE:  ABX and Anti fungal per ID.  DW ID.  Doubt Tb.     HEMATOLOGICAL:  Lovenox therapeutic.  Monitor CBC    ENDOCRINE:  Follow up FS.  Insulin protocol if needed.    MUSCULOSKELETAL: Bedrest.  Off loading.  Wound care.    Prognosis very poor.    Palliative care following    SDU. IMPRESSION:    Acute hypoxemic respiratory failure  Likely aspiration pneumonia   Lung Abscess  Sepsis POA  Septic shock off Levophed   Recurrent aspiration pneumonia / prior intubation  HO DVT on Eliquis   HO GI bleed  HO OM  HO recent duodenal perforation   HO polymicrobial bacteremia   H/o CP, DS  H/o seizures    PLAN:    CNS:  Avoid CNS Depressant, AED. MS at baseline.    HEENT: Oral care.     PULMONARY: HOB at 45 degrees.  Aspiration precaution. Wean FiO2 Keep spo2 more than 92%.  CXR noted. Daily CXR for now. Aggressive pulm toilet, frequent suctioning.  Weaned to LFNC  5L    CARDIOVASCULAR: Keep MAP more than 60. Avoid overload. C/w midodrine 20mg.  Goal directed fluid resuscitation. Off levophed.     GI: Protonix. NG feeding.  Might need PEG when more stable    INFECTIOUS DISEASE:  ABX and Anti fungal per ID.  DW ID.  Doubt Tb.     HEMATOLOGICAL:  Lovenox therapeutic.  Monitor CBC    ENDOCRINE:  Follow up FS.  Insulin protocol if needed.    MUSCULOSKELETAL: Bedrest.  Off loading.  Wound care.    Prognosis very poor.    Palliative care following    SDU > Possible DG in PM IMPRESSION:    Acute hypoxemic respiratory failure  Likely aspiration pneumonia   Lung Abscess  Sepsis POA  Septic shock off Levophed   Recurrent aspiration pneumonia / prior intubation  HO DVT on Eliquis   HO GI bleed  HO OM  HO recent duodenal perforation   HO polymicrobial bacteremia   H/o CP, DS  H/o seizures    PLAN:    CNS:  Avoid CNS Depressant, AED. MS at baseline.    HEENT: Oral care.     PULMONARY: HOB at 45 degrees.  Aspiration precaution. Wean FiO2 Keep spo2 more than 92%.  CXR noted. Daily CXR for now. Aggressive pulm toilet, frequent suctioning.  Weaned to LFNC  5L    CARDIOVASCULAR: Keep MAP more than 60. Avoid overload. C/w midodrine 20mg.  Goal directed fluid resuscitation. Off levophed.     GI: Protonix. NG feeding.  Might need PEG when more stable    INFECTIOUS DISEASE:  ABX and Anti fungal per ID.  DW ID.  Doubt Tb.     HEMATOLOGICAL:  Lovenox therapeutic.  Monitor CBC    ENDOCRINE:  Follow up FS.  Insulin protocol if needed.    MUSCULOSKELETAL: Bedrest.  Off loading.  Wound care.    Prognosis very poor.    Palliative care following    SDU

## 2024-02-25 NOTE — PROGRESS NOTE ADULT - SUBJECTIVE AND OBJECTIVE BOX
Patient is a 58y old  Female who presents with a chief complaint of Respiratory Distress (24 Feb 2024 13:28)        Over Night Events:        ROS:     All ROS are negative except HPI         PHYSICAL EXAM    ICU Vital Signs Last 24 Hrs  T(C): 36.2 (25 Feb 2024 04:30), Max: 36.6 (24 Feb 2024 08:54)  T(F): 97.2 (25 Feb 2024 04:30), Max: 97.9 (24 Feb 2024 08:54)  HR: 96 (25 Feb 2024 04:30) (61 - 106)  BP: 118/75 (25 Feb 2024 04:30) (96/67 - 118/75)  BP(mean): 91 (25 Feb 2024 04:30) (73 - 91)  ABP: --  ABP(mean): --  RR: 18 (25 Feb 2024 04:30) (18 - 20)  SpO2: 91% (25 Feb 2024 04:30) (91% - 100%)    O2 Parameters below as of 25 Feb 2024 04:30  Patient On (Oxygen Delivery Method): nasal cannula w/ humidification  O2 Flow (L/min): 40  O2 Concentration (%): 49        CONSTITUTIONAL:  NAD    ENT:   Airway patent,   Mouth with normal mucosa.   No thrush    EYES:   Pupils equal,   Round and reactive to light.    CARDIAC:   Normal rate,   Regular rhythm.    No edema    RESPIRATORY:   No wheezing  Bilateral BS  Normal chest expansion  Not tachypneic,  No use of accessory muscles    GASTROINTESTINAL:  Abdomen soft,   Non-tender,   No guarding,   + BS    MUSCULOSKELETAL:   Range of motion is not limited,  No clubbing, cyanosis    NEUROLOGICAL:   Alert and oriented   No motor  deficits.    SKIN:   Skin normal color for race,   Warm and dry and intact.   No evidence of rash.        02-24-24 @ 07:01  -  02-25-24 @ 07:00  --------------------------------------------------------  IN:    dextrose 5% + lactated ringers: 750 mL    Enteral Tube Flush: 600 mL    Jevity 1.2: 1200 mL  Total IN: 2550 mL    OUT:    Voided (mL): 600 mL  Total OUT: 600 mL    Total NET: 1950 mL          LABS:                            8.4    9.03  )-----------( 614      ( 25 Feb 2024 06:56 )             28.4                                               02-24    142  |  102  |  14  ----------------------------<  154<H>  3.8   |  31  |  0.5<L>    Ca    8.6      24 Feb 2024 06:16  Mg     2.3     02-24    TPro  6.1  /  Alb  2.4<L>  /  TBili  <0.2  /  DBili  x   /  AST  17  /  ALT  18  /  AlkPhos  81  02-24                                             Urinalysis Basic - ( 24 Feb 2024 06:16 )    Color: x / Appearance: x / SG: x / pH: x  Gluc: 154 mg/dL / Ketone: x  / Bili: x / Urobili: x   Blood: x / Protein: x / Nitrite: x   Leuk Esterase: x / RBC: x / WBC x   Sq Epi: x / Non Sq Epi: x / Bacteria: x                                                  LIVER FUNCTIONS - ( 24 Feb 2024 06:16 )  Alb: 2.4 g/dL / Pro: 6.1 g/dL / ALK PHOS: 81 U/L / ALT: 18 U/L / AST: 17 U/L / GGT: x                                                                                                                                       MEDICATIONS  (STANDING):  acetylcysteine 20%  Inhalation 4 milliLiter(s) Inhalation every 6 hours  albuterol/ipratropium for Nebulization 3 milliLiter(s) Nebulizer every 6 hours  AQUAPHOR (petrolatum Ointment) 1 Application(s) Topical two times a day  artificial  tears Solution 1 Drop(s) Both EYES two times a day  calcium carbonate   1250 mG (OsCal) 1 Tablet(s) Oral two times a day  caspofungin IVPB 50 milliGRAM(s) IV Intermittent every 24 hours  caspofungin IVPB      chlorhexidine 2% Cloths 1 Application(s) Topical daily  dextrose 5% + lactated ringers. 1000 milliLiter(s) (75 mL/Hr) IV Continuous <Continuous>  enoxaparin Injectable 50 milliGRAM(s) SubCutaneous every 12 hours  gabapentin 300 milliGRAM(s) Oral every 12 hours  levETIRAcetam  IVPB 500 milliGRAM(s) IV Intermittent two times a day  melatonin 3 milliGRAM(s) Oral at bedtime  midodrine. 20 milliGRAM(s) Oral three times a day  pantoprazole  Injectable 40 milliGRAM(s) IV Push every 24 hours  polyethylene glycol 3350 17 Gram(s) Oral at bedtime  raloxifene 60 milliGRAM(s) Oral daily  saccharomyces boulardii 250 milliGRAM(s) Oral two times a day  senna 2 Tablet(s) Oral at bedtime  sodium chloride 0.65% Nasal 2 Spray(s) Both Nostrils three times a day    MEDICATIONS  (PRN):  aluminum hydroxide/magnesium hydroxide/simethicone Suspension 30 milliLiter(s) Oral every 4 hours PRN Dyspepsia  ondansetron Injectable 4 milliGRAM(s) IV Push every 8 hours PRN Nausea and/or Vomiting      New X-rays reviewed:                                                                                  ECHO    CXR interpreted by me:       Patient is a 58y old  Female who presents with a chief complaint of Respiratory Distress (24 Feb 2024 13:28)        Over Night Events:  Off pressors.  On 5L NC        ROS:     All ROS are negative except HPI         PHYSICAL EXAM    ICU Vital Signs Last 24 Hrs  T(C): 36.2 (25 Feb 2024 04:30), Max: 36.6 (24 Feb 2024 08:54)  T(F): 97.2 (25 Feb 2024 04:30), Max: 97.9 (24 Feb 2024 08:54)  HR: 96 (25 Feb 2024 04:30) (61 - 106)  BP: 118/75 (25 Feb 2024 04:30) (96/67 - 118/75)  BP(mean): 91 (25 Feb 2024 04:30) (73 - 91)  ABP: --  ABP(mean): --  RR: 18 (25 Feb 2024 04:30) (18 - 20)  SpO2: 91% (25 Feb 2024 04:30) (91% - 100%)    O2 Parameters below as of 25 Feb 2024 04:30  Patient On (Oxygen Delivery Method): nasal cannula w/ humidification  O2 Flow (L/min): 40  O2 Concentration (%): 49        CONSTITUTIONAL:  NAD    ENT:   Airway patent,   NC    EYES:   Pupils equal,   Round and reactive to light.    CARDIAC:   Normal rate,   Regular rhythm.    No edema    RESPIRATORY:   Bilateral crackles  Normal chest expansion  Not tachypneic,  No use of accessory muscles    GASTROINTESTINAL:  Abdomen soft,   Non-tender,   No guarding,     MUSCULOSKELETAL:   Contracted    NEUROLOGICAL:   Alert  Non-verbal    SKIN:   Skin normal color for race,   Warm and dry and intact.   No evidence of rash.        02-24-24 @ 07:01  -  02-25-24 @ 07:00  --------------------------------------------------------  IN:    dextrose 5% + lactated ringers: 750 mL    Enteral Tube Flush: 600 mL    Jevity 1.2: 1200 mL  Total IN: 2550 mL    OUT:    Voided (mL): 600 mL  Total OUT: 600 mL    Total NET: 1950 mL          LABS:                            8.4    9.03  )-----------( 614      ( 25 Feb 2024 06:56 )             28.4                                               02-24    142  |  102  |  14  ----------------------------<  154<H>  3.8   |  31  |  0.5<L>    Ca    8.6      24 Feb 2024 06:16  Mg     2.3     02-24    TPro  6.1  /  Alb  2.4<L>  /  TBili  <0.2  /  DBili  x   /  AST  17  /  ALT  18  /  AlkPhos  81  02-24                                             Urinalysis Basic - ( 24 Feb 2024 06:16 )    Color: x / Appearance: x / SG: x / pH: x  Gluc: 154 mg/dL / Ketone: x  / Bili: x / Urobili: x   Blood: x / Protein: x / Nitrite: x   Leuk Esterase: x / RBC: x / WBC x   Sq Epi: x / Non Sq Epi: x / Bacteria: x                                                  LIVER FUNCTIONS - ( 24 Feb 2024 06:16 )  Alb: 2.4 g/dL / Pro: 6.1 g/dL / ALK PHOS: 81 U/L / ALT: 18 U/L / AST: 17 U/L / GGT: x                                                                                                                                       MEDICATIONS  (STANDING):  acetylcysteine 20%  Inhalation 4 milliLiter(s) Inhalation every 6 hours  albuterol/ipratropium for Nebulization 3 milliLiter(s) Nebulizer every 6 hours  AQUAPHOR (petrolatum Ointment) 1 Application(s) Topical two times a day  artificial  tears Solution 1 Drop(s) Both EYES two times a day  calcium carbonate   1250 mG (OsCal) 1 Tablet(s) Oral two times a day  caspofungin IVPB 50 milliGRAM(s) IV Intermittent every 24 hours  caspofungin IVPB      chlorhexidine 2% Cloths 1 Application(s) Topical daily  dextrose 5% + lactated ringers. 1000 milliLiter(s) (75 mL/Hr) IV Continuous <Continuous>  enoxaparin Injectable 50 milliGRAM(s) SubCutaneous every 12 hours  gabapentin 300 milliGRAM(s) Oral every 12 hours  levETIRAcetam  IVPB 500 milliGRAM(s) IV Intermittent two times a day  melatonin 3 milliGRAM(s) Oral at bedtime  midodrine. 20 milliGRAM(s) Oral three times a day  pantoprazole  Injectable 40 milliGRAM(s) IV Push every 24 hours  polyethylene glycol 3350 17 Gram(s) Oral at bedtime  raloxifene 60 milliGRAM(s) Oral daily  saccharomyces boulardii 250 milliGRAM(s) Oral two times a day  senna 2 Tablet(s) Oral at bedtime  sodium chloride 0.65% Nasal 2 Spray(s) Both Nostrils three times a day    MEDICATIONS  (PRN):  aluminum hydroxide/magnesium hydroxide/simethicone Suspension 30 milliLiter(s) Oral every 4 hours PRN Dyspepsia  ondansetron Injectable 4 milliGRAM(s) IV Push every 8 hours PRN Nausea and/or Vomiting      New X-rays reviewed:                                                                                  ECHO    CXR interpreted by me:

## 2024-02-26 LAB
ALBUMIN SERPL ELPH-MCNC: 2.4 G/DL — LOW (ref 3.5–5.2)
ALP SERPL-CCNC: 97 U/L — SIGNIFICANT CHANGE UP (ref 30–115)
ALT FLD-CCNC: 13 U/L — SIGNIFICANT CHANGE UP (ref 0–41)
ANION GAP SERPL CALC-SCNC: 8 MMOL/L — SIGNIFICANT CHANGE UP (ref 7–14)
AST SERPL-CCNC: 15 U/L — SIGNIFICANT CHANGE UP (ref 0–41)
BASOPHILS # BLD AUTO: 0.07 K/UL — SIGNIFICANT CHANGE UP (ref 0–0.2)
BASOPHILS NFR BLD AUTO: 0.9 % — SIGNIFICANT CHANGE UP (ref 0–1)
BILIRUB SERPL-MCNC: <0.2 MG/DL — SIGNIFICANT CHANGE UP (ref 0.2–1.2)
BUN SERPL-MCNC: 12 MG/DL — SIGNIFICANT CHANGE UP (ref 10–20)
CALCIUM SERPL-MCNC: 8.3 MG/DL — LOW (ref 8.4–10.5)
CHLORIDE SERPL-SCNC: 99 MMOL/L — SIGNIFICANT CHANGE UP (ref 98–110)
CO2 SERPL-SCNC: 31 MMOL/L — SIGNIFICANT CHANGE UP (ref 17–32)
CREAT SERPL-MCNC: <0.5 MG/DL — LOW (ref 0.7–1.5)
EGFR: 115 ML/MIN/1.73M2 — SIGNIFICANT CHANGE UP
EOSINOPHIL # BLD AUTO: 0.47 K/UL — SIGNIFICANT CHANGE UP (ref 0–0.7)
EOSINOPHIL NFR BLD AUTO: 5.8 % — SIGNIFICANT CHANGE UP (ref 0–8)
GLUCOSE BLDC GLUCOMTR-MCNC: 112 MG/DL — HIGH (ref 70–99)
GLUCOSE BLDC GLUCOMTR-MCNC: 250 MG/DL — HIGH (ref 70–99)
GLUCOSE SERPL-MCNC: 124 MG/DL — HIGH (ref 70–99)
HCT VFR BLD CALC: 26.4 % — LOW (ref 37–47)
HGB BLD-MCNC: 7.9 G/DL — LOW (ref 12–16)
IMM GRANULOCYTES NFR BLD AUTO: 0.5 % — HIGH (ref 0.1–0.3)
LYMPHOCYTES # BLD AUTO: 1.7 K/UL — SIGNIFICANT CHANGE UP (ref 1.2–3.4)
LYMPHOCYTES # BLD AUTO: 21.1 % — SIGNIFICANT CHANGE UP (ref 20.5–51.1)
MAGNESIUM SERPL-MCNC: 2.2 MG/DL — SIGNIFICANT CHANGE UP (ref 1.8–2.4)
MCHC RBC-ENTMCNC: 23.3 PG — LOW (ref 27–31)
MCHC RBC-ENTMCNC: 29.9 G/DL — LOW (ref 32–37)
MCV RBC AUTO: 77.9 FL — LOW (ref 81–99)
MONOCYTES # BLD AUTO: 0.47 K/UL — SIGNIFICANT CHANGE UP (ref 0.1–0.6)
MONOCYTES NFR BLD AUTO: 5.8 % — SIGNIFICANT CHANGE UP (ref 1.7–9.3)
NEUTROPHILS # BLD AUTO: 5.3 K/UL — SIGNIFICANT CHANGE UP (ref 1.4–6.5)
NEUTROPHILS NFR BLD AUTO: 65.9 % — SIGNIFICANT CHANGE UP (ref 42.2–75.2)
NRBC # BLD: 0 /100 WBCS — SIGNIFICANT CHANGE UP (ref 0–0)
PLATELET # BLD AUTO: 531 K/UL — HIGH (ref 130–400)
PMV BLD: 11.1 FL — HIGH (ref 7.4–10.4)
POTASSIUM SERPL-MCNC: 4.5 MMOL/L — SIGNIFICANT CHANGE UP (ref 3.5–5)
POTASSIUM SERPL-SCNC: 4.5 MMOL/L — SIGNIFICANT CHANGE UP (ref 3.5–5)
PROT SERPL-MCNC: 5.8 G/DL — LOW (ref 6–8)
RBC # BLD: 3.39 M/UL — LOW (ref 4.2–5.4)
RBC # FLD: 21 % — HIGH (ref 11.5–14.5)
SODIUM SERPL-SCNC: 138 MMOL/L — SIGNIFICANT CHANGE UP (ref 135–146)
WBC # BLD: 8.05 K/UL — SIGNIFICANT CHANGE UP (ref 4.8–10.8)
WBC # FLD AUTO: 8.05 K/UL — SIGNIFICANT CHANGE UP (ref 4.8–10.8)

## 2024-02-26 PROCEDURE — 99233 SBSQ HOSP IP/OBS HIGH 50: CPT

## 2024-02-26 PROCEDURE — 99232 SBSQ HOSP IP/OBS MODERATE 35: CPT

## 2024-02-26 PROCEDURE — 71045 X-RAY EXAM CHEST 1 VIEW: CPT | Mod: 26

## 2024-02-26 RX ADMIN — MEROPENEM 100 MILLIGRAM(S): 1 INJECTION INTRAVENOUS at 21:06

## 2024-02-26 RX ADMIN — PANTOPRAZOLE SODIUM 40 MILLIGRAM(S): 20 TABLET, DELAYED RELEASE ORAL at 05:17

## 2024-02-26 RX ADMIN — ENOXAPARIN SODIUM 50 MILLIGRAM(S): 100 INJECTION SUBCUTANEOUS at 05:17

## 2024-02-26 RX ADMIN — MIDODRINE HYDROCHLORIDE 20 MILLIGRAM(S): 2.5 TABLET ORAL at 18:00

## 2024-02-26 RX ADMIN — LEVETIRACETAM 400 MILLIGRAM(S): 250 TABLET, FILM COATED ORAL at 05:18

## 2024-02-26 RX ADMIN — Medication 2 SPRAY(S): at 21:07

## 2024-02-26 RX ADMIN — Medication 3 MILLILITER(S): at 21:13

## 2024-02-26 RX ADMIN — MIDODRINE HYDROCHLORIDE 20 MILLIGRAM(S): 2.5 TABLET ORAL at 11:37

## 2024-02-26 RX ADMIN — Medication 1 DROP(S): at 18:02

## 2024-02-26 RX ADMIN — Medication 250 MILLIGRAM(S): at 05:14

## 2024-02-26 RX ADMIN — Medication 2 SPRAY(S): at 15:49

## 2024-02-26 RX ADMIN — Medication 1 TABLET(S): at 18:01

## 2024-02-26 RX ADMIN — GABAPENTIN 300 MILLIGRAM(S): 400 CAPSULE ORAL at 05:13

## 2024-02-26 RX ADMIN — Medication 3 MILLILITER(S): at 13:21

## 2024-02-26 RX ADMIN — Medication 4 MILLILITER(S): at 21:09

## 2024-02-26 RX ADMIN — Medication 1 DROP(S): at 05:19

## 2024-02-26 RX ADMIN — GABAPENTIN 300 MILLIGRAM(S): 400 CAPSULE ORAL at 18:00

## 2024-02-26 RX ADMIN — RALOXIFENE HYDROCHLORIDE 60 MILLIGRAM(S): 60 TABLET, COATED ORAL at 11:37

## 2024-02-26 RX ADMIN — MEROPENEM 100 MILLIGRAM(S): 1 INJECTION INTRAVENOUS at 15:49

## 2024-02-26 RX ADMIN — Medication 1 TABLET(S): at 05:14

## 2024-02-26 RX ADMIN — Medication 1 APPLICATION(S): at 18:02

## 2024-02-26 RX ADMIN — LEVETIRACETAM 400 MILLIGRAM(S): 250 TABLET, FILM COATED ORAL at 17:58

## 2024-02-26 RX ADMIN — MEROPENEM 100 MILLIGRAM(S): 1 INJECTION INTRAVENOUS at 05:19

## 2024-02-26 RX ADMIN — Medication 1 APPLICATION(S): at 05:19

## 2024-02-26 RX ADMIN — MIDODRINE HYDROCHLORIDE 20 MILLIGRAM(S): 2.5 TABLET ORAL at 05:13

## 2024-02-26 RX ADMIN — Medication 4 MILLILITER(S): at 13:21

## 2024-02-26 RX ADMIN — ENOXAPARIN SODIUM 50 MILLIGRAM(S): 100 INJECTION SUBCUTANEOUS at 18:02

## 2024-02-26 RX ADMIN — CASPOFUNGIN ACETATE 260 MILLIGRAM(S): 7 INJECTION, POWDER, LYOPHILIZED, FOR SOLUTION INTRAVENOUS at 11:36

## 2024-02-26 RX ADMIN — POLYETHYLENE GLYCOL 3350 17 GRAM(S): 17 POWDER, FOR SOLUTION ORAL at 21:06

## 2024-02-26 RX ADMIN — Medication 250 MILLIGRAM(S): at 18:01

## 2024-02-26 RX ADMIN — SENNA PLUS 2 TABLET(S): 8.6 TABLET ORAL at 21:06

## 2024-02-26 RX ADMIN — Medication 3 MILLIGRAM(S): at 21:07

## 2024-02-26 RX ADMIN — Medication 4 MILLILITER(S): at 10:10

## 2024-02-26 RX ADMIN — CHLORHEXIDINE GLUCONATE 1 APPLICATION(S): 213 SOLUTION TOPICAL at 11:53

## 2024-02-26 RX ADMIN — Medication 3 MILLILITER(S): at 10:10

## 2024-02-26 RX ADMIN — Medication 2 SPRAY(S): at 05:19

## 2024-02-26 NOTE — PROGRESS NOTE ADULT - ASSESSMENT
ASSESSMENT  58F w/ PMHx Down Syndrome, nonverbal at baseline, Hypothyroidism, Cerebral palsy and Seizure Disorders presents to the ED from nursing facility/group home presented to ED for respiratory distress and high grade fever.     IMPRESSION  #Fevers, resolving , HAP / aspiration with cavitary PNA  Doubt TB, admission CXR without findings   < from: CT Chest w/ IV Cont (02.21.24 @ 00:09) >  Bilateral pneumonia, left worse than right, with a left upper lobe   thick-walled cavity. Diffuse opacification of the   left lung is seen with what appears to be some mucus plugging centrally   within the left mainstem bronchus. There is a pleural effusion.   Reactive mediastinal   lymph nodes are seen.    MRSA PCR Result.: Negative (02-20-24 @ 13:42)    Procalcitonin, Serum: 0.16 (02-19-24 @ 16:00)- unremarkable     2/17 BCX NGTD     Rapid RVP Result: NotDetec (02-17-24 @ 19:06)    2/12 BCX NGTD    Procalcitonin, Serum: 0.10 (02-12-24 @ 11:17)    Rapid RVP Result: NotDetec (02-12-24 @ 10:28)    MRSA PCR Result.: Negative (02-12-24 @ 10:28)    2/9 BCX NGTD x2    2/3 BCX NGTD     2/1 BCX NGTD     2/1 UCX   >=3 organisms. Probable collection contamination.    1/31 BCX NG    Rapid RVP Result: NotDetec (02-04-24 @ 10:28)    MRSA PCR Result.: Negative (02-03-24 @ 21:32)     UA without significant pyuria   Procalcitonin, Serum: 0.22 (02-01-24 @ 16:00)--> Procalcitonin, Serum: 0.15 (02-03-24 @ 11:13), downtrending ; unremarkable   MRSA PCR Result.: Negative (01-22-24 @ 05:30)  < from: Xray Chest 1 View-PORTABLE IMMEDIATE (Xray Chest 1 View-PORTABLE IMMEDIATE .) (02.01.24 @ 03:02) >  Bibasal opacities without significant change.   #Acute hypoxic respiratory failure- Gram Negative pneumonia   #1/20 BCX 1/4 bottles : Staphylococcus hominis- contaminant   Repeat CX NG   #Severe Sepsis on admission  #RSV + 1/21  #Elevated fungitell   serum aspergillus galactomannan and histo are (-)  Fungitell: 76 (02-19-24 @ 16:00)  Fungitell: 63 (02-06-24 @ 11:27)  Fungitell: 325 (01-20-24 @ 21:23)  Fungitell: 138 (11-07-23 @ 08:08)  Fungitell: 115 (11-02-23 @ 11:40)  Fungitell: >500 pg/mL (10.17.23 @ 17:20) s/p empiric caspo   #Full thickness ulcer right heel- Appreciate burn/podiatry evaluation.  #History of Right planter foot ulcers - two full thickness ulcers - serous drainage with mild erythema with OM  - MR Foot No Cont, Right (10.16.23 @ 21:51): IMPRESSION: 1.  Limited exam. 2.  Osteomyelitis of the first metatarsal stump. 3.  Osteomyelitis of the second toe distal phalanx.  - s/p excidional debridement to and including bone 1st metatarsal with partial 2nd digit amputation - 1st metatarsal head resected - Wound Cx Proteus ESBL   #CKD 2-3 Creatinine: 0.7 mg/dL (02.02.24 @ 04:59)      #History of buttock ulcer  #Down syndrome/Cerebral Palsy     RECOMMENDATIONS  - quantiferon gold negative  - Doubt TB- no need for airborne isolation  - Continue Meropenem for suspected lung abscess   (1/21-1/27), 2/1-2/16, restarted 2/18 , will need 4-6 week course with repeat CT [facility] imaging to determine final course   - Fungitell started downtrending 1 day after Caspo started, doubt related, but at this point given critical illness would plan on completing a course  - Continue caspofungin 50mg q24h IV , hx unclear elevated fungitell. No clear source,No risk factors for PCP or other invasive fungal infection . Continuing for now  - Tentative end date 3/10 for danna/caspo  - Grave prognosis, GOC , aggressive care is futile    If any questions, please send a message or call on [employer]  Please continue to update ID with any pertinent new laboratory or radiographic findings.

## 2024-02-26 NOTE — PROGRESS NOTE ADULT - SUBJECTIVE AND OBJECTIVE BOX
JERICHO TEA  58y, Female  Allergy: No Known Allergies      LOS  37d    CHIEF COMPLAINT: Respiratory Distress (26 Feb 2024 11:51)      INTERVAL EVENTS/HPI  - No acute events overnight  - T(F): , Max: 98.8 (02-26-24 @ 00:26)  - Tolerating medication  - WBC Count: 8.05 (02-26-24 @ 06:45)  WBC Count: 9.03 (02-25-24 @ 06:56)     - Creatinine: <0.5 (02-26-24 @ 06:45)  Creatinine: <0.5 (02-25-24 @ 06:56)       ROS  unable to obtain history secondary to patient's mental status and/or sedation     VITALS:  T(F): 98, Max: 98.8 (02-26-24 @ 00:26)  HR: 53  BP: 92/53  RR: 20Vital Signs Last 24 Hrs  T(C): 36.7 (26 Feb 2024 08:33), Max: 37.1 (25 Feb 2024 21:01)  T(F): 98 (26 Feb 2024 08:33), Max: 98.8 (26 Feb 2024 00:26)  HR: 53 (26 Feb 2024 08:33) (53 - 90)  BP: 92/53 (26 Feb 2024 08:33) (88/52 - 114/54)  BP(mean): --  RR: 20 (26 Feb 2024 08:33) (20 - 20)  SpO2: 99% (26 Feb 2024 08:33) (97% - 100%)    Parameters below as of 26 Feb 2024 00:26  Patient On (Oxygen Delivery Method): nasal cannula        PHYSICAL EXAM:  Gen: chronically ill appearing   HEENT: Normocephalic, atraumatic  Neck: supple, no lymphadenopathy  CV: Regular rate & regular rhythm  Lungs: decreased BS at bases, no fremitus  Abdomen: Soft, BS present  Ext: Warm, well perfused  Neuro: non focal, not following commands  Skin: no rash, no erythema  Lines: no phlebitis     FH: Non-contributory  Social Hx: Non-contributory    TESTS & MEASUREMENTS:                        7.9    8.05  )-----------( 531      ( 26 Feb 2024 06:45 )             26.4     02-26    138  |  99  |  12  ----------------------------<  124<H>  4.5   |  31  |  <0.5<L>    Ca    8.3<L>      26 Feb 2024 06:45  Mg     2.2     02-26    TPro  5.8<L>  /  Alb  2.4<L>  /  TBili  <0.2  /  DBili  x   /  AST  15  /  ALT  13  /  AlkPhos  97  02-26      LIVER FUNCTIONS - ( 26 Feb 2024 06:45 )  Alb: 2.4 g/dL / Pro: 5.8 g/dL / ALK PHOS: 97 U/L / ALT: 13 U/L / AST: 15 U/L / GGT: x           Urinalysis Basic - ( 26 Feb 2024 06:45 )    Color: x / Appearance: x / SG: x / pH: x  Gluc: 124 mg/dL / Ketone: x  / Bili: x / Urobili: x   Blood: x / Protein: x / Nitrite: x   Leuk Esterase: x / RBC: x / WBC x   Sq Epi: x / Non Sq Epi: x / Bacteria: x        Culture - Blood (collected 02-19-24 @ 11:23)  Source: .Blood None  Final Report (02-24-24 @ 20:01):    No growth at 5 days    Culture - Blood (collected 02-17-24 @ 20:41)  Source: .Blood None  Final Report (02-23-24 @ 06:00):    No growth at 5 days    Culture - Blood (collected 02-12-24 @ 11:17)  Source: .Blood None  Final Report (02-17-24 @ 21:00):    No growth at 5 days    Culture - Blood (collected 02-09-24 @ 11:57)  Source: .Blood None  Final Report (02-14-24 @ 22:00):    No growth at 5 days    Culture - Blood (collected 02-09-24 @ 11:57)  Source: .Blood None  Final Report (02-14-24 @ 22:00):    No growth at 5 days    Culture - Blood (collected 02-03-24 @ 11:13)  Source: .Blood None  Final Report (02-08-24 @ 20:00):    No growth at 5 days    Culture - Blood (collected 02-01-24 @ 11:58)  Source: .Blood None  Final Report (02-06-24 @ 23:06):    No growth at 5 days    Culture - Urine (collected 02-01-24 @ 11:40)  Source: Clean Catch Clean Catch (Midstream)  Final Report (02-03-24 @ 00:06):    >=3 organisms. Probable collection contamination.    Culture - Blood (collected 01-31-24 @ 18:21)  Source: .Blood None  Final Report (02-06-24 @ 01:01):    No growth at 5 days    Culture - Blood (collected 01-31-24 @ 18:21)  Source: .Blood None  Final Report (02-06-24 @ 01:01):    No growth at 5 days            INFECTIOUS DISEASES TESTING  MRSA PCR Result.: Negative (02-20-24 @ 13:42)  Fungitell: 76 (02-19-24 @ 16:00)  Procalcitonin, Serum: 0.16 (02-19-24 @ 16:00)  Procalcitonin, Serum: 0.20 (02-19-24 @ 11:23)  Rapid RVP Result: NotDetec (02-17-24 @ 19:06)  Procalcitonin, Serum: 0.11 (02-17-24 @ 10:41)  MRSA PCR Result.: Negative (02-15-24 @ 06:20)  Procalcitonin, Serum: 0.10 (02-12-24 @ 11:17)  Rapid RVP Result: NotDetec (02-12-24 @ 10:28)  MRSA PCR Result.: Negative (02-12-24 @ 10:28)  Fungitell: 63 (02-06-24 @ 11:27)  Fungitell: 65 (02-06-24 @ 04:43)  Rapid RVP Result: NotDetec (02-04-24 @ 10:28)  MRSA PCR Result.: Negative (02-03-24 @ 21:32)  Procalcitonin, Serum: 0.15 (02-03-24 @ 11:13)  Procalcitonin, Serum: 0.22 (02-01-24 @ 16:00)  MRSA PCR Result.: Negative (01-22-24 @ 05:30)  Streptococcus pneumoniae Ag, Ur Result: Negative (01-21-24 @ 05:00)  Legionella Antigen, Urine: Negative (01-21-24 @ 05:00)  Rapid RVP Result: Detected (01-21-24 @ 00:58)  Procalcitonin, Serum: 0.51 (01-20-24 @ 21:23)  Fungitell: 325 (01-20-24 @ 21:23)  Vancomycin Level, Trough: 5.9 (11-12-23 @ 17:55)  Fungitell: 138 (11-07-23 @ 08:08)  Fungitell: 115 (11-02-23 @ 11:40)  Procalcitonin, Serum: 0.04 (11-02-23 @ 11:40)  Procalcitonin, Serum: 0.26 (10-24-23 @ 11:00)  MRSA PCR Result.: Negative (10-17-23 @ 17:20)  Fungitell: >500 (10-17-23 @ 17:20)  Procalcitonin, Serum: 4.36 (10-17-23 @ 17:20)  Procalcitonin, Serum: 4.18 (10-17-23 @ 12:35)  Procalcitonin, Serum: 0.07 (10-15-23 @ 07:28)  COVID-19 PCR: NotDetec (10-12-23 @ 09:14)  Procalcitonin, Serum: 0.40 (10-07-23 @ 10:54)  Streptococcus pneumoniae Ag, Ur Result: Negative (09-30-23 @ 14:36)  Legionella Antigen, Urine: Negative (09-30-23 @ 14:36)  MRSA PCR Result.: Negative (09-29-23 @ 16:50)  Procalcitonin, Serum: 9.78 (08-12-23 @ 11:25)  MRSA PCR Result.: Negative (08-12-23 @ 06:10)  Rapid RVP Result: NotDetec (08-12-23 @ 01:33)  Procalcitonin, Serum: 0.63 (08-10-23 @ 08:46)  Procalcitonin, Serum: 7.82 (08-04-23 @ 16:13)  MRSA PCR Result.: Negative (08-04-23 @ 14:52)  Rapid RVP Result: NotDetec (08-04-23 @ 03:00)  strept    INFLAMMATORY MARKERS  Sedimentation Rate, Erythrocyte: 100 mm/Hr (02-03-24 @ 11:13)  C-Reactive Protein, Serum: 214.2 mg/L (02-03-24 @ 11:13)  C-Reactive Protein, Serum: 132.3 mg/L (02-01-24 @ 16:00)  Sedimentation Rate, Erythrocyte: 67 mm/Hr (10-15-23 @ 07:28)      RADIOLOGY & ADDITIONAL TESTS:  I have personally reviewed the last available Chest xray  CXR  Xray Chest 1 View- PORTABLE-Urgent:   ACC: [id number] EXAM:  XR CHEST PORTABLE URGENT 1V   ORDERED BY: SERA PHILLIP     PROCEDURE DATE:  02/23/2024          INTERPRETATION:  Clinical History / Reason for exam: Feeding tube   placement    Comparison : Chest radiograph 2/23/2024 performed at 4:08 AM.    Technique/Positioning: Frontal chest radiograph.    Findings:    Support devices: Feeding tube projecting over the gastric bubble.    Cardiac/mediastinum/hilum: Stable.    Lung parenchyma/Pleura: Unchanged bilateral opacities, left greater than   right.    Skeleton/soft tissues: Stable.    Impression:    Stable left greater than right opacities. No pneumothorax.          --- End of Report ---            VARUN GAGE MD; Attending Radiologist  This document has been electronically signed. Feb 23 2024  3:24PM (02-23-24 @ 15:04)      CT      CARDIOLOGY TESTING  12 Lead ECG:   Ventricular Rate 124 BPM    Atrial Rate 124 BPM    P-R Interval 114 ms    QRS Duration 64 ms    Q-T Interval 328 ms    QTC Calculation(Bazett) 471 ms    P Axis 31 degrees    R Axis 38 degrees    T Axis 49 degrees    Diagnosis Line Sinus tachycardia  Possible Left atrial enlargement  Borderline ECG    Confirmed by MARJAN MENDES MD ([id number]) on 2/23/2024 1:23:43 PM (02-23-24 @ 11:46)      MEDICATIONS  acetylcysteine 20%  Inhalation 4 Inhalation every 6 hours  albuterol/ipratropium for Nebulization 3 Nebulizer every 6 hours  AQUAPHOR (petrolatum Ointment) 1 Topical two times a day  artificial  tears Solution 1 Both EYES two times a day  calcium carbonate   1250 mG (OsCal) 1 Oral two times a day  caspofungin IVPB     caspofungin IVPB 50 IV Intermittent every 24 hours  chlorhexidine 2% Cloths 1 Topical daily  chlorhexidine 2% Cloths 1 Topical daily  enoxaparin Injectable 50 SubCutaneous every 12 hours  gabapentin 300 Oral every 12 hours  levETIRAcetam  IVPB 500 IV Intermittent two times a day  melatonin 3 Oral at bedtime  meropenem  IVPB 1000 IV Intermittent every 8 hours  meropenem  IVPB     midodrine. 20 Oral three times a day  pantoprazole  Injectable 40 IV Push every 24 hours  polyethylene glycol 3350 17 Oral at bedtime  raloxifene 60 Oral daily  saccharomyces boulardii 250 Oral two times a day  senna 2 Oral at bedtime  sodium chloride 0.65% Nasal 2 Both Nostrils three times a day      WEIGHT  Weight (kg): 52.3 (01-21-24 @ 08:00)  Creatinine: <0.5 mg/dL (02-26-24 @ 06:45)      ANTIBIOTICS:  caspofungin IVPB 50 milliGRAM(s) IV Intermittent every 24 hours  caspofungin IVPB      meropenem  IVPB      meropenem  IVPB 1000 milliGRAM(s) IV Intermittent every 8 hours      All available historical records have been reviewed

## 2024-02-26 NOTE — PROGRESS NOTE ADULT - SUBJECTIVE AND OBJECTIVE BOX
Over Night Events: events noted, on NC, afebrile    PHYSICAL EXAM    ICU Vital Signs Last 24 Hrs  T(C): 37.1 (26 Feb 2024 04:00), Max: 37.1 (25 Feb 2024 21:01)  T(F): 98.7 (26 Feb 2024 04:00), Max: 98.8 (26 Feb 2024 00:26)  HR: 63 (26 Feb 2024 04:00) (63 - 97)  BP: 114/54 (26 Feb 2024 04:00) (88/52 - 118/75)  BP(mean): 73 (25 Feb 2024 11:54) (73 - 91)  RR: 20 (26 Feb 2024 04:00) (19 - 20)  SpO2: 97% (26 Feb 2024 04:00) (97% - 100%)    O2 Parameters below as of 26 Feb 2024 00:26  Patient On (Oxygen Delivery Method): nasal cannula            General: ill looking  Lungs: Bilateral rhonchi  Cardiovascular: HARPREET 2.6  Abdomen: Soft, Positive BS  Extremities: No clubbing   Skin: Warm  Neurological: Non focal       02-25-24 @ 07:01  -  02-26-24 @ 07:00  --------------------------------------------------------  IN:    Enteral Tube Flush: 150 mL    IV PiggyBack: 300 mL    Jevity 1.2: 600 mL  Total IN: 1050 mL    OUT:    Voided (mL): 1295 mL  Total OUT: 1295 mL    Total NET: -245 mL          LABS:                          7.9    8.05  )-----------( 531      ( 26 Feb 2024 06:45 )             26.4                                               02-26    138  |  99  |  12  ----------------------------<  124<H>  4.5   |  31  |  <0.5<L>    Ca    8.3<L>      26 Feb 2024 06:45  Mg     2.2     02-26    TPro  5.8<L>  /  Alb  2.4<L>  /  TBili  <0.2  /  DBili  x   /  AST  15  /  ALT  13  /  AlkPhos  97  02-26                                             Urinalysis Basic - ( 26 Feb 2024 06:45 )    Color: x / Appearance: x / SG: x / pH: x  Gluc: 124 mg/dL / Ketone: x  / Bili: x / Urobili: x   Blood: x / Protein: x / Nitrite: x   Leuk Esterase: x / RBC: x / WBC x   Sq Epi: x / Non Sq Epi: x / Bacteria: x                                                  LIVER FUNCTIONS - ( 26 Feb 2024 06:45 )  Alb: 2.4 g/dL / Pro: 5.8 g/dL / ALK PHOS: 97 U/L / ALT: 13 U/L / AST: 15 U/L / GGT: x                                                                                                                                       MEDICATIONS  (STANDING):  acetylcysteine 20%  Inhalation 4 milliLiter(s) Inhalation every 6 hours  albuterol/ipratropium for Nebulization 3 milliLiter(s) Nebulizer every 6 hours  AQUAPHOR (petrolatum Ointment) 1 Application(s) Topical two times a day  artificial  tears Solution 1 Drop(s) Both EYES two times a day  calcium carbonate   1250 mG (OsCal) 1 Tablet(s) Oral two times a day  caspofungin IVPB 50 milliGRAM(s) IV Intermittent every 24 hours  caspofungin IVPB      chlorhexidine 2% Cloths 1 Application(s) Topical daily  chlorhexidine 2% Cloths 1 Application(s) Topical daily  enoxaparin Injectable 50 milliGRAM(s) SubCutaneous every 12 hours  gabapentin 300 milliGRAM(s) Oral every 12 hours  levETIRAcetam  IVPB 500 milliGRAM(s) IV Intermittent two times a day  melatonin 3 milliGRAM(s) Oral at bedtime  meropenem  IVPB      meropenem  IVPB 1000 milliGRAM(s) IV Intermittent every 8 hours  midodrine. 20 milliGRAM(s) Oral three times a day  pantoprazole  Injectable 40 milliGRAM(s) IV Push every 24 hours  polyethylene glycol 3350 17 Gram(s) Oral at bedtime  raloxifene 60 milliGRAM(s) Oral daily  saccharomyces boulardii 250 milliGRAM(s) Oral two times a day  senna 2 Tablet(s) Oral at bedtime  sodium chloride 0.65% Nasal 2 Spray(s) Both Nostrils three times a day    MEDICATIONS  (PRN):  aluminum hydroxide/magnesium hydroxide/simethicone Suspension 30 milliLiter(s) Oral every 4 hours PRN Dyspepsia  ondansetron Injectable 4 milliGRAM(s) IV Push every 8 hours PRN Nausea and/or Vomiting      CXR noted

## 2024-02-26 NOTE — PROGRESS NOTE ADULT - SUBJECTIVE AND OBJECTIVE BOX
24H events:    Patient is a 58y old Female who presents with a chief complaint of Respiratory Distress (26 Feb 2024 08:07)    Primary diagnosis of Sepsis with acute hypoxic respiratory failure    Today is hospital day 37d. This morning patient was seen and examined at bedside, resting comfortably in bed.    No acute or major events overnight. Hemodynamically stable, tolerating oral diet, voiding appropriately with appropriate bowel movements.     off levo   map in low 60s w/midodrine   on 5L NC    Code Status:    Family communication:  Contact date:  Name of person contacted:  Relationship to patient:  Communication details:  What matters most:    PAST MEDICAL & SURGICAL HISTORY  Down syndrome    Osteoporosis    Mild anemia    Neuropathy    S/P debridement  of R hip on 3/2/21      SOCIAL HISTORY:  Social History:      ALLERGIES:  No Known Allergies    MEDICATIONS:  STANDING MEDICATIONS  acetylcysteine 20%  Inhalation 4 milliLiter(s) Inhalation every 6 hours  albuterol/ipratropium for Nebulization 3 milliLiter(s) Nebulizer every 6 hours  AQUAPHOR (petrolatum Ointment) 1 Application(s) Topical two times a day  artificial  tears Solution 1 Drop(s) Both EYES two times a day  calcium carbonate   1250 mG (OsCal) 1 Tablet(s) Oral two times a day  caspofungin IVPB      caspofungin IVPB 50 milliGRAM(s) IV Intermittent every 24 hours  chlorhexidine 2% Cloths 1 Application(s) Topical daily  chlorhexidine 2% Cloths 1 Application(s) Topical daily  enoxaparin Injectable 50 milliGRAM(s) SubCutaneous every 12 hours  gabapentin 300 milliGRAM(s) Oral every 12 hours  levETIRAcetam  IVPB 500 milliGRAM(s) IV Intermittent two times a day  melatonin 3 milliGRAM(s) Oral at bedtime  meropenem  IVPB 1000 milliGRAM(s) IV Intermittent every 8 hours  meropenem  IVPB      midodrine. 20 milliGRAM(s) Oral three times a day  pantoprazole  Injectable 40 milliGRAM(s) IV Push every 24 hours  polyethylene glycol 3350 17 Gram(s) Oral at bedtime  raloxifene 60 milliGRAM(s) Oral daily  saccharomyces boulardii 250 milliGRAM(s) Oral two times a day  senna 2 Tablet(s) Oral at bedtime  sodium chloride 0.65% Nasal 2 Spray(s) Both Nostrils three times a day    PRN MEDICATIONS  aluminum hydroxide/magnesium hydroxide/simethicone Suspension 30 milliLiter(s) Oral every 4 hours PRN  ondansetron Injectable 4 milliGRAM(s) IV Push every 8 hours PRN    VITALS:   T(F): 98  HR: 53  BP: 92/53  RR: 20  SpO2: 99%    PHYSICAL EXAM:    Gen: NAD, resting in bed  HEENT: Normocephalic, atraumatic  Neck: supple, no lymphadenopathy  CV: Regular rate & regular rhythm  Lungs: decrease bs b/l, no wheeze, on HFNC  Abdomen: Soft, NTND+ BS present, NGT  Ext: Warm, well perfused no CCE  Neuro: non focal, awake, does not follow commands  Skin: no rash, no erythema    (  ) Indwelling Madsen Catheter:   Date insterted:    Reason (  ) Critical illness     (  ) urinary retention    (  ) Accurate Ins/Outs Monitoring     (  ) CMO patient    (  ) Central Line:   Date inserted:  Location: (  ) Right IJ     (  ) Left IJ     (  ) Right Fem     (  ) Left Fem    (  ) SPC        (  ) pigtail       (  ) PEG tube       (  ) colostomy       (  ) jejunostomy  (  ) U-Dall    LABS:                        7.9    8.05  )-----------( 531      ( 26 Feb 2024 06:45 )             26.4     02-26    138  |  99  |  12  ----------------------------<  124<H>  4.5   |  31  |  <0.5<L>    Ca    8.3<L>      26 Feb 2024 06:45  Mg     2.2     02-26    TPro  5.8<L>  /  Alb  2.4<L>  /  TBili  <0.2  /  DBili  x   /  AST  15  /  ALT  13  /  AlkPhos  97  02-26      Urinalysis Basic - ( 26 Feb 2024 06:45 )    Color: x / Appearance: x / SG: x / pH: x  Gluc: 124 mg/dL / Ketone: x  / Bili: x / Urobili: x   Blood: x / Protein: x / Nitrite: x   Leuk Esterase: x / RBC: x / WBC x   Sq Epi: x / Non Sq Epi: x / Bacteria: x                RADIOLOGY:    RADIOLOGY

## 2024-02-26 NOTE — PROGRESS NOTE ADULT - ASSESSMENT
IMPRESSION:    Acute hypoxemic respiratory failure on NC  Likely aspiration pneumonia   Sepsis POA  Septic shock off Levophed   Recurrent aspiration pneumonia / prior intubation  HO DVT on Eliquis   HO GI bleed  HO OM  HO recent duodenal perforation   HO polymicrobial bacteremia   H/o CP, DS  H/o seizures    PLAN:    CNS:  Avoid CNS Depressant, AED. MS at baseline.    HEENT: Oral care.     PULMONARY: HOB at 45 degrees.  Aspiration precaution. Wean FiO2 Keep spo2 more than 92%.  low flow NC    CARDIOVASCULAR: Keep MAP more than 60. Avoid overload. C/w midodrine    GI: Protonix. NG feeding.  Might need PEG when more stable    INFECTIOUS DISEASE:  ABX and Anti fungal per ID.     HEMATOLOGICAL:  Lovenox therapeutic.  Monitor CBC    ENDOCRINE:  Follow up FS.  Insulin protocol if needed.    MUSCULOSKELETAL: Bedrest.  Off loading.  Wound care.    Prognosis very poor.    Palliative care following    SDU

## 2024-02-26 NOTE — PROGRESS NOTE ADULT - ASSESSMENT
58F w/ PMHx Down Syndrome, nonverbal at baseline, Hypothyroidism, Cerebral palsy and Seizure Disorders [facility] ED [facility] presented to ED for respiratory distress and high grade fever. Patient found to be septic on admission. Admitted to SDU for management of acute respiratory distress 2/2 CAP/Aspiration PNA (20 Jan 2024 18:22)  While pt was on the floor she developed dyspnea after swallow evaluation, was spiking high grade fever, consulted by pulmonary/critical care and was upgraded back to SDU.    # Sepsis POA / Acute hypoxic resp failure / RSV bronchiolitis /  H/o Dysphagia/ suspected aspiration PNA /high grade fever   - off levophed   - continue midodrine   - continue caspo, danna  - ID following   - Failed FEES, put NG tube for feedings and medications, pt'll likely need PEG/trach when stable, consulted by GI   - supplement oxygen, monitor pulse Ox, on high flow oxygen for weeks   - c/w duoneb  - pulmonary is following, recommendations noted  - aggressive chest PT with vest, aspiration precautions and suction     # Elevated Troponin , type 2 MI/  Sinus tachycardia  - c/w telemetry monitoring   - Repeat EKG: Normal sinus rhythm  - c/w  metoprolol  -  maintain fluid balance   - TTE 2/19 normal EF no DD or valve abnormalities    # Seizure Disorder  - c/w  Keppra   - seizure precautions  - keep Mg above 2.0     # H/o lower ext DVT  - eliquis to lovenox     # Hypothyroidism  - Thyroid Stimulating Hormone, Serum: 0.51 uIU/mL (01.21.24 @ 06:34)    # Normocytic Anemia, anemia of chronic disease   - monitor H/H, keep Hb above 7.5     # Down Syndrome/  Cerebral Palsy  - supportive care  - prevent falls and aspiration   - failed speech and swallow, needs PEG   - on Raloxifene     #Supportive Management:  Dispo: sdu  DVT Ppx: enoxaparin Injectable 50 milliGRAM(s) SubCutaneous every 12 hours  GI Ppx: pantoprazole  Injectable 40 milliGRAM(s) IV Push every 24 hours  Diet:  Diet, NPO with Tube Feed:   Tube Feeding Modality: Nasogastric  Jevity 1.2 Juventino      # Full code  - conversation started with patient's representative and progress note faxed to start considering DNR/DNI. for now full code.   La Nena Vasquez [phone]

## 2024-02-26 NOTE — PROGRESS NOTE ADULT - ATTENDING COMMENTS
Patient seen and examined. Agree with above    Patient known to me from earlier this admission. Respiratory status slowly improving s/p HFNC, now on 5L NC  continue with Meropenem and caspofungin until 3/10 per ID  recall GI once 02 requirements decrease for PEG placement  [place], FULL CODE with ongoing medical management    continue monitoring in SDU

## 2024-02-27 LAB
1,3 BETA GLUCAN SER QL: ABNORMAL
ALBUMIN SERPL ELPH-MCNC: 2.5 G/DL — LOW (ref 3.5–5.2)
ALP SERPL-CCNC: 107 U/L — SIGNIFICANT CHANGE UP (ref 30–115)
ALT FLD-CCNC: 13 U/L — SIGNIFICANT CHANGE UP (ref 0–41)
ANION GAP SERPL CALC-SCNC: 6 MMOL/L — LOW (ref 7–14)
APTT BLD: 37.8 SEC — SIGNIFICANT CHANGE UP (ref 27–39.2)
AST SERPL-CCNC: 14 U/L — SIGNIFICANT CHANGE UP (ref 0–41)
BASOPHILS # BLD AUTO: 0.09 K/UL — SIGNIFICANT CHANGE UP (ref 0–0.2)
BASOPHILS NFR BLD AUTO: 0.7 % — SIGNIFICANT CHANGE UP (ref 0–1)
BILIRUB SERPL-MCNC: <0.2 MG/DL — SIGNIFICANT CHANGE UP (ref 0.2–1.2)
BUN SERPL-MCNC: 12 MG/DL — SIGNIFICANT CHANGE UP (ref 10–20)
CALCIUM SERPL-MCNC: 8.2 MG/DL — LOW (ref 8.4–10.5)
CHLORIDE SERPL-SCNC: 100 MMOL/L — SIGNIFICANT CHANGE UP (ref 98–110)
CO2 SERPL-SCNC: 33 MMOL/L — HIGH (ref 17–32)
CREAT SERPL-MCNC: <0.5 MG/DL — LOW (ref 0.7–1.5)
EGFR: 115 ML/MIN/1.73M2 — SIGNIFICANT CHANGE UP
EOSINOPHIL # BLD AUTO: 0.51 K/UL — SIGNIFICANT CHANGE UP (ref 0–0.7)
EOSINOPHIL NFR BLD AUTO: 3.9 % — SIGNIFICANT CHANGE UP (ref 0–8)
FUNGITELL: 73 PG/ML
GLUCOSE BLDC GLUCOMTR-MCNC: 106 MG/DL — HIGH (ref 70–99)
GLUCOSE BLDC GLUCOMTR-MCNC: 95 MG/DL — SIGNIFICANT CHANGE UP (ref 70–99)
GLUCOSE SERPL-MCNC: 177 MG/DL — HIGH (ref 70–99)
HCT VFR BLD CALC: 27.7 % — LOW (ref 37–47)
HGB BLD-MCNC: 8.2 G/DL — LOW (ref 12–16)
IMM GRANULOCYTES NFR BLD AUTO: 0.5 % — HIGH (ref 0.1–0.3)
INR BLD: 1.1 RATIO — SIGNIFICANT CHANGE UP (ref 0.65–1.3)
LYMPHOCYTES # BLD AUTO: 1.94 K/UL — SIGNIFICANT CHANGE UP (ref 1.2–3.4)
LYMPHOCYTES # BLD AUTO: 14.9 % — LOW (ref 20.5–51.1)
MAGNESIUM SERPL-MCNC: 2.3 MG/DL — SIGNIFICANT CHANGE UP (ref 1.8–2.4)
MCHC RBC-ENTMCNC: 23.4 PG — LOW (ref 27–31)
MCHC RBC-ENTMCNC: 29.6 G/DL — LOW (ref 32–37)
MCV RBC AUTO: 79.1 FL — LOW (ref 81–99)
MONOCYTES # BLD AUTO: 0.61 K/UL — HIGH (ref 0.1–0.6)
MONOCYTES NFR BLD AUTO: 4.7 % — SIGNIFICANT CHANGE UP (ref 1.7–9.3)
NEUTROPHILS # BLD AUTO: 9.82 K/UL — HIGH (ref 1.4–6.5)
NEUTROPHILS NFR BLD AUTO: 75.3 % — HIGH (ref 42.2–75.2)
NRBC # BLD: 0 /100 WBCS — SIGNIFICANT CHANGE UP (ref 0–0)
PLATELET # BLD AUTO: 624 K/UL — HIGH (ref 130–400)
PMV BLD: 10.1 FL — SIGNIFICANT CHANGE UP (ref 7.4–10.4)
POTASSIUM SERPL-MCNC: 4.7 MMOL/L — SIGNIFICANT CHANGE UP (ref 3.5–5)
POTASSIUM SERPL-SCNC: 4.7 MMOL/L — SIGNIFICANT CHANGE UP (ref 3.5–5)
PROT SERPL-MCNC: 5.9 G/DL — LOW (ref 6–8)
PROTHROM AB SERPL-ACNC: 12.5 SEC — SIGNIFICANT CHANGE UP (ref 9.95–12.87)
RBC # BLD: 3.5 M/UL — LOW (ref 4.2–5.4)
RBC # FLD: 21.5 % — HIGH (ref 11.5–14.5)
SODIUM SERPL-SCNC: 139 MMOL/L — SIGNIFICANT CHANGE UP (ref 135–146)
WBC # BLD: 13.04 K/UL — HIGH (ref 4.8–10.8)
WBC # FLD AUTO: 13.04 K/UL — HIGH (ref 4.8–10.8)

## 2024-02-27 PROCEDURE — 71045 X-RAY EXAM CHEST 1 VIEW: CPT | Mod: 26

## 2024-02-27 PROCEDURE — 99233 SBSQ HOSP IP/OBS HIGH 50: CPT

## 2024-02-27 PROCEDURE — 99232 SBSQ HOSP IP/OBS MODERATE 35: CPT

## 2024-02-27 RX ORDER — SODIUM CHLORIDE 9 MG/ML
500 INJECTION INTRAMUSCULAR; INTRAVENOUS; SUBCUTANEOUS ONCE
Refills: 0 | Status: DISCONTINUED | OUTPATIENT
Start: 2024-02-27 | End: 2024-03-24

## 2024-02-27 RX ADMIN — Medication 3 MILLILITER(S): at 08:44

## 2024-02-27 RX ADMIN — Medication 3 MILLIGRAM(S): at 21:24

## 2024-02-27 RX ADMIN — Medication 4 MILLILITER(S): at 21:06

## 2024-02-27 RX ADMIN — MEROPENEM 100 MILLIGRAM(S): 1 INJECTION INTRAVENOUS at 13:38

## 2024-02-27 RX ADMIN — MIDODRINE HYDROCHLORIDE 20 MILLIGRAM(S): 2.5 TABLET ORAL at 11:43

## 2024-02-27 RX ADMIN — CASPOFUNGIN ACETATE 260 MILLIGRAM(S): 7 INJECTION, POWDER, LYOPHILIZED, FOR SOLUTION INTRAVENOUS at 09:40

## 2024-02-27 RX ADMIN — Medication 2 SPRAY(S): at 13:45

## 2024-02-27 RX ADMIN — Medication 1 DROP(S): at 06:15

## 2024-02-27 RX ADMIN — Medication 3 MILLILITER(S): at 13:03

## 2024-02-27 RX ADMIN — RALOXIFENE HYDROCHLORIDE 60 MILLIGRAM(S): 60 TABLET, COATED ORAL at 11:43

## 2024-02-27 RX ADMIN — Medication 1 APPLICATION(S): at 17:18

## 2024-02-27 RX ADMIN — Medication 1 DROP(S): at 17:17

## 2024-02-27 RX ADMIN — Medication 3 MILLILITER(S): at 21:05

## 2024-02-27 RX ADMIN — GABAPENTIN 300 MILLIGRAM(S): 400 CAPSULE ORAL at 17:16

## 2024-02-27 RX ADMIN — Medication 1 APPLICATION(S): at 06:15

## 2024-02-27 RX ADMIN — Medication 250 MILLIGRAM(S): at 17:17

## 2024-02-27 RX ADMIN — LEVETIRACETAM 400 MILLIGRAM(S): 250 TABLET, FILM COATED ORAL at 05:39

## 2024-02-27 RX ADMIN — Medication 2 SPRAY(S): at 21:25

## 2024-02-27 RX ADMIN — LEVETIRACETAM 400 MILLIGRAM(S): 250 TABLET, FILM COATED ORAL at 17:16

## 2024-02-27 RX ADMIN — MEROPENEM 100 MILLIGRAM(S): 1 INJECTION INTRAVENOUS at 05:40

## 2024-02-27 RX ADMIN — PANTOPRAZOLE SODIUM 40 MILLIGRAM(S): 20 TABLET, DELAYED RELEASE ORAL at 05:38

## 2024-02-27 RX ADMIN — MIDODRINE HYDROCHLORIDE 20 MILLIGRAM(S): 2.5 TABLET ORAL at 05:36

## 2024-02-27 RX ADMIN — Medication 1 TABLET(S): at 17:16

## 2024-02-27 RX ADMIN — Medication 4 MILLILITER(S): at 08:46

## 2024-02-27 RX ADMIN — Medication 2 SPRAY(S): at 05:38

## 2024-02-27 RX ADMIN — GABAPENTIN 300 MILLIGRAM(S): 400 CAPSULE ORAL at 05:36

## 2024-02-27 RX ADMIN — MIDODRINE HYDROCHLORIDE 20 MILLIGRAM(S): 2.5 TABLET ORAL at 17:16

## 2024-02-27 RX ADMIN — Medication 1 TABLET(S): at 05:37

## 2024-02-27 RX ADMIN — ENOXAPARIN SODIUM 50 MILLIGRAM(S): 100 INJECTION SUBCUTANEOUS at 05:37

## 2024-02-27 RX ADMIN — CHLORHEXIDINE GLUCONATE 1 APPLICATION(S): 213 SOLUTION TOPICAL at 11:44

## 2024-02-27 RX ADMIN — ENOXAPARIN SODIUM 50 MILLIGRAM(S): 100 INJECTION SUBCUTANEOUS at 17:17

## 2024-02-27 RX ADMIN — Medication 250 MILLIGRAM(S): at 05:37

## 2024-02-27 RX ADMIN — MEROPENEM 100 MILLIGRAM(S): 1 INJECTION INTRAVENOUS at 21:24

## 2024-02-27 NOTE — PROGRESS NOTE ADULT - SUBJECTIVE AND OBJECTIVE BOX
Over Night Events: events noted, on NC, ID reviewed    PHYSICAL EXAM    ICU Vital Signs Last 24 Hrs  T(C): 36.8 (27 Feb 2024 04:00), Max: 37.8 (26 Feb 2024 20:05)  T(F): 98.2 (27 Feb 2024 04:00), Max: 100 (26 Feb 2024 20:05)  HR: 101 (27 Feb 2024 04:00) (53 - 101)  BP: 97/54 (27 Feb 2024 04:00) (88/49 - 103/50)  BP(mean): 72 (26 Feb 2024 12:00) (72 - 72)  RR: 20 (27 Feb 2024 04:00) (20 - 20)  SpO2: 80% (26 Feb 2024 20:05) (80% - 99%)    O2 Parameters below as of 26 Feb 2024 20:05  Patient On (Oxygen Delivery Method): nasal cannula  O2 Flow (L/min): 5          General: ill looking  Lungs: l side rhonchi  Cardiovascular: Regular   Abdomen: Soft, Positive BS  Extremities: No clubbing   Neurological: Non focal       02-26-24 @ 07:01  -  02-27-24 @ 07:00  --------------------------------------------------------  IN:    Enteral Tube Flush: 300 mL    Jevity 1.2: 600 mL  Total IN: 900 mL    OUT:  Total OUT: 0 mL    Total NET: 900 mL          LABS:                          7.9    8.05  )-----------( 531      ( 26 Feb 2024 06:45 )             26.4                                               02-26    138  |  99  |  12  ----------------------------<  124<H>  4.5   |  31  |  <0.5<L>    Ca    8.3<L>      26 Feb 2024 06:45  Mg     2.2     02-26    TPro  5.8<L>  /  Alb  2.4<L>  /  TBili  <0.2  /  DBili  x   /  AST  15  /  ALT  13  /  AlkPhos  97  02-26                                             Urinalysis Basic - ( 26 Feb 2024 06:45 )    Color: x / Appearance: x / SG: x / pH: x  Gluc: 124 mg/dL / Ketone: x  / Bili: x / Urobili: x   Blood: x / Protein: x / Nitrite: x   Leuk Esterase: x / RBC: x / WBC x   Sq Epi: x / Non Sq Epi: x / Bacteria: x                                                  LIVER FUNCTIONS - ( 26 Feb 2024 06:45 )  Alb: 2.4 g/dL / Pro: 5.8 g/dL / ALK PHOS: 97 U/L / ALT: 13 U/L / AST: 15 U/L / GGT: x                                                  Culture - Blood (collected 25 Feb 2024 11:47)  Source: .Blood None  Preliminary Report (26 Feb 2024 20:02):    No growth at 24 hours                                                                                           MEDICATIONS  (STANDING):  acetylcysteine 20%  Inhalation 4 milliLiter(s) Inhalation every 6 hours  albuterol/ipratropium for Nebulization 3 milliLiter(s) Nebulizer every 6 hours  AQUAPHOR (petrolatum Ointment) 1 Application(s) Topical two times a day  artificial  tears Solution 1 Drop(s) Both EYES two times a day  calcium carbonate   1250 mG (OsCal) 1 Tablet(s) Oral two times a day  caspofungin IVPB 50 milliGRAM(s) IV Intermittent every 24 hours  caspofungin IVPB      chlorhexidine 2% Cloths 1 Application(s) Topical daily  chlorhexidine 2% Cloths 1 Application(s) Topical daily  enoxaparin Injectable 50 milliGRAM(s) SubCutaneous every 12 hours  gabapentin 300 milliGRAM(s) Oral every 12 hours  levETIRAcetam  IVPB 500 milliGRAM(s) IV Intermittent two times a day  melatonin 3 milliGRAM(s) Oral at bedtime  meropenem  IVPB      meropenem  IVPB 1000 milliGRAM(s) IV Intermittent every 8 hours  midodrine. 20 milliGRAM(s) Oral three times a day  pantoprazole  Injectable 40 milliGRAM(s) IV Push every 24 hours  polyethylene glycol 3350 17 Gram(s) Oral at bedtime  raloxifene 60 milliGRAM(s) Oral daily  saccharomyces boulardii 250 milliGRAM(s) Oral two times a day  senna 2 Tablet(s) Oral at bedtime  sodium chloride 0.65% Nasal 2 Spray(s) Both Nostrils three times a day  sodium chloride 0.9% Bolus 500 milliLiter(s) IV Bolus once    MEDICATIONS  (PRN):  aluminum hydroxide/magnesium hydroxide/simethicone Suspension 30 milliLiter(s) Oral every 4 hours PRN Dyspepsia  ondansetron Injectable 4 milliGRAM(s) IV Push every 8 hours PRN Nausea and/or Vomiting      cxr noted

## 2024-02-27 NOTE — PROGRESS NOTE ADULT - SUBJECTIVE AND OBJECTIVE BOX
24H events:    Patient is a 58y old Female who presents with a chief complaint of Respiratory Distress (27 Feb 2024 09:28)    Primary diagnosis of Sepsis with acute hypoxic respiratory failure      Day 1:  Day 2:  Day 3:     Today is hospital day 38d. This morning patient was seen and examined at bedside, resting comfortably in bed.    No acute or major events overnight. Hemodynamically stable, tolerating oral diet, voiding appropriately with appropriate bowel movements.     Code Status:    Family communication:  Contact date:  Name of person contacted:  Relationship to patient:  Communication details:  What matters most:    PAST MEDICAL & SURGICAL HISTORY  Down syndrome    Osteoporosis    Mild anemia    Neuropathy    S/P debridement  of R hip on 3/2/21      SOCIAL HISTORY:  Social History:      ALLERGIES:  No Known Allergies    MEDICATIONS:  STANDING MEDICATIONS  acetylcysteine 20%  Inhalation 4 milliLiter(s) Inhalation every 6 hours  albuterol/ipratropium for Nebulization 3 milliLiter(s) Nebulizer every 6 hours  AQUAPHOR (petrolatum Ointment) 1 Application(s) Topical two times a day  artificial  tears Solution 1 Drop(s) Both EYES two times a day  calcium carbonate   1250 mG (OsCal) 1 Tablet(s) Oral two times a day  caspofungin IVPB 50 milliGRAM(s) IV Intermittent every 24 hours  caspofungin IVPB      chlorhexidine 2% Cloths 1 Application(s) Topical daily  chlorhexidine 2% Cloths 1 Application(s) Topical daily  enoxaparin Injectable 50 milliGRAM(s) SubCutaneous every 12 hours  gabapentin 300 milliGRAM(s) Oral every 12 hours  levETIRAcetam  IVPB 500 milliGRAM(s) IV Intermittent two times a day  melatonin 3 milliGRAM(s) Oral at bedtime  meropenem  IVPB 1000 milliGRAM(s) IV Intermittent every 8 hours  meropenem  IVPB      midodrine. 20 milliGRAM(s) Oral three times a day  pantoprazole  Injectable 40 milliGRAM(s) IV Push every 24 hours  polyethylene glycol 3350 17 Gram(s) Oral at bedtime  raloxifene 60 milliGRAM(s) Oral daily  saccharomyces boulardii 250 milliGRAM(s) Oral two times a day  senna 2 Tablet(s) Oral at bedtime  sodium chloride 0.65% Nasal 2 Spray(s) Both Nostrils three times a day  sodium chloride 0.9% Bolus 500 milliLiter(s) IV Bolus once    PRN MEDICATIONS  aluminum hydroxide/magnesium hydroxide/simethicone Suspension 30 milliLiter(s) Oral every 4 hours PRN  ondansetron Injectable 4 milliGRAM(s) IV Push every 8 hours PRN    VITALS:   T(F): 98.4  HR: 90  BP: 97/53  RR: 20  SpO2: 94%    PHYSICAL EXAM:  Gen: NAD, resting in bed  HEENT: Normocephalic, atraumatic  Neck: supple, no lymphadenopathy  CV: Regular rate & regular rhythm  Lungs: decrease bs b/l, no wheeze, on HFNC  Abdomen: Soft, NTND+ BS present, NGT  Ext: Warm, well perfused no CCE  Neuro: non focal, awake, does not follow commands  Skin: no rash, no erythema      (  ) Indwelling Madsen Catheter:   Date insterted:    Reason (  ) Critical illness     (  ) urinary retention    (  ) Accurate Ins/Outs Monitoring     (  ) CMO patient    (  ) Central Line:   Date inserted:  Location: (  ) Right IJ     (  ) Left IJ     (  ) Right Fem     (  ) Left Fem    (  ) SPC        (  ) pigtail       (  ) PEG tube       (  ) colostomy       (  ) jejunostomy  (  ) U-Dall    LABS:                        8.2    13.04 )-----------( 624      ( 27 Feb 2024 06:53 )             27.7     02-27    139  |  100  |  12  ----------------------------<  177<H>  4.7   |  33<H>  |  <0.5<L>    Ca    8.2<L>      27 Feb 2024 06:53  Mg     2.3     02-27    TPro  5.9<L>  /  Alb  2.5<L>  /  TBili  <0.2  /  DBili  x   /  AST  14  /  ALT  13  /  AlkPhos  107  02-27    PT/INR - ( 27 Feb 2024 06:53 )   PT: 12.50 sec;   INR: 1.10 ratio         PTT - ( 27 Feb 2024 06:53 )  PTT:37.8 sec  Urinalysis Basic - ( 27 Feb 2024 06:53 )    Color: x / Appearance: x / SG: x / pH: x  Gluc: 177 mg/dL / Ketone: x  / Bili: x / Urobili: x   Blood: x / Protein: x / Nitrite: x   Leuk Esterase: x / RBC: x / WBC x   Sq Epi: x / Non Sq Epi: x / Bacteria: x            Culture - Blood (collected 25 Feb 2024 11:47)  Source: .Blood None  Preliminary Report (26 Feb 2024 20:02):    No growth at 24 hours          RADIOLOGY:    RADIOLOGY

## 2024-02-27 NOTE — PROGRESS NOTE ADULT - ASSESSMENT
58F w/ PMHx Down Syndrome, nonverbal at baseline, Hypothyroidism, Cerebral palsy and Seizure Disorders presents [facility] the ED [facility] presented to ED for respiratory distress and high grade fever. Patient found to be septic on admission. Admitted to SDU for management of acute respiratory distress 2/2 CAP/Aspiration PNA (20 Jan 2024 18:22)  While pt was on the floor she developed dyspnea after swallow evaluation, was spiking high grade fever, consulted by pulmonary/critical care and was upgraded back to SDU.    # Sepsis POA / Acute hypoxic resp failure / RSV bronchiolitis /  H/o Dysphagia/ suspected aspiration PNA /high grade fever   - off levophed   - continue midodrine   - continue caspo, danna  - ID following   - Failed FEES, put NG tube for feedings and medications, pt'll likely need PEG/trach when stable, consulted by GI   - supplement oxygen, monitor pulse Ox, on high flow oxygen for weeks   - c/w duoneb  - pulmonary is following, recommendations noted  - aggressive chest PT with vest, aspiration precautions and suction   - continue caspo, danna until 3/10 per ID       # Elevated Troponin , type 2 MI/  Sinus tachycardia  - c/w telemetry monitoring   - Repeat EKG: Normal sinus rhythm  - c/w  metoprolol  -  maintain fluid balance   - TTE 2/19 normal EF no DD or valve abnormalities    # Seizure Disorder  - c/w  Keppra   - seizure precautions  - keep Mg above 2.0     # H/o lower ext DVT  - eliquis to lovenox     # Hypothyroidism  - Thyroid Stimulating Hormone, Serum: 0.51 uIU/mL (01.21.24 @ 06:34)    # Normocytic Anemia, anemia of chronic disease   - monitor H/H, keep Hb above 7.5     # Down Syndrome/  Cerebral Palsy  - supportive care  - prevent falls and aspiration   - failed speech and swallow, needs PEG   - on Raloxifene     #Supportive Management:  Dispo: sdu  DVT Ppx: enoxaparin Injectable 50 milliGRAM(s) SubCutaneous every 12 hours  GI Ppx: pantoprazole  Injectable 40 milliGRAM(s) IV Push every 24 hours  Diet:  Diet, NPO with Tube Feed:   Tube Feeding Modality: Nasogastric  Jevity 1.2 Juventino      # Full code  - conversation started with patient's representative and progress note faxed to start considering DNR/DNI. for now full code.   La Nena Vasquez [phone]      Full code, palliative following for ongoing GOC, pt is [facility], paperwork to discuss code status faxed to CAB     Pending: taper down supplemental 02, recall GI once o2 requirements decrease for PEG, fu palliative

## 2024-02-27 NOTE — PROGRESS NOTE ADULT - ASSESSMENT
58F w/ PMHx Down Syndrome, nonverbal at baseline, Hypothyroidism, Cerebral palsy and Seizure Disorders [facility] presented to ED for respiratory distress and high grade fever. Patient found to be septic on admission. Admitted to SDU for management of acute respiratory distress 2/2 CAP/Aspiration PNA (20 Jan 2024 18:22)  While pt was on the floor she developed dyspnea after swallow evaluation, was spiking high grade fever, consulted by pulmonary/critical care and was upgraded back to SDU.    Sepsis POA / Acute hypoxic resp failure / RSV bronchiolitis /  H/o Dysphagia/ suspected aspiration    - off levophed, on 5L NC  - continue midodrine 20 Q8H  - continue caspo, danna until 3/10 per ID   - Failed FEES,  NG tube for feedings and medications, patient will need PEG tube once 02 requirements decrease  - c/w duoneb, chest PT, aspiration precautions   - pulmonary is following  - aggressive chest PT with vest, aspiration precautions and suction       Elevated Troponin , type 2 MI/  Sinus tachycardia  - c/w telemetry monitoring   - Repeat EKG: Normal sinus rhythm  - c/w  metoprolol  - TTE 2/19 normal EF no DD or valve abnormalities    Seizure Disorder  - c/w  Keppra   - seizure precautions  - keep Mg above 2.0     H/o lower ext DVT - c/w therapeutic Lovenox, Eliquis on dc     Hypothyroidism- TSH 0.51    Normocytic Anemia, anemia of chronic disease - monitor H/H, keep Hb above 7.5     Down Syndrome/  Cerebral Palsy  - supportive care  - prevent falls and aspiration   - failed speech and swallow, needs PEG as above  - on Raloxifene     Full code, palliative following for ongoing GOC, pt is [facility], paperwork to discuss code status faxed to CAB     Pending: taper down supplemental 02, recall GI once o2 requirements decrease for PEG, fu palliative

## 2024-02-27 NOTE — CHART NOTE - NSCHARTNOTEFT_GEN_A_CORE
Registered Dietitian Follow-Up     Patient Profile Reviewed                           Yes [x]   No []     Nutrition History Previously Obtained        Yes []  No {x}     Pertinent Subjective Information:  RD spoke with RN - patient tolerating feeds     Pertinent Medical Interventions:   Sepsis POA / Acute hypoxic resp failure / RSV bronchiolitis /  H/o Dysphagia/ suspected aspiration PNA /high grade fever; - Failed FEES, put NG tube for feedings and medications, pt'll likely need PEG/trach when stable, consulted by GI;      Diet order:   Diet, NPO with Tube Feed:   Tube Feeding Modality: Nasogastric  Jevity 1.2 Juventino  Total Volume for 24 Hours (mL): 1200  Bolus  Total Volume of Bolus (mL):  300  Tube Feed Frequency: Every 6 hours   Tube Feed Start Time: 00:00   Bolus Feed Duration (in Hours): 1.5  Free Water Flush  Bolus   Total Volume per Flush (mL): 250   Frequency: Every 6 Hours  Free Water Flush Instructions:  75mL water flush pre and then post feeds. Keep HOB >45 degress during feeds  No Carb Prosource TF     Qty per Day:  1 (24 @ 08:30) [Active]    Anthropometrics:  Height: 136.4  Weight: 52.3  BMI: 28.1  IBW: 29.5 kg     Daily Weight in k.3 (-), Weight in k (-), Weight in k (-), Weight in k (-), Weight in k (-), Weight in k (-)  % Weight Change    MEDICATIONS  (STANDING):  acetylcysteine 20%  Inhalation 4 milliLiter(s) Inhalation every 6 hours  albuterol/ipratropium for Nebulization 3 milliLiter(s) Nebulizer every 6 hours  AQUAPHOR (petrolatum Ointment) 1 Application(s) Topical two times a day  artificial  tears Solution 1 Drop(s) Both EYES two times a day  calcium carbonate   1250 mG (OsCal) 1 Tablet(s) Oral two times a day  caspofungin IVPB 50 milliGRAM(s) IV Intermittent every 24 hours  caspofungin IVPB      chlorhexidine 2% Cloths 1 Application(s) Topical daily  chlorhexidine 2% Cloths 1 Application(s) Topical daily  enoxaparin Injectable 50 milliGRAM(s) SubCutaneous every 12 hours  gabapentin 300 milliGRAM(s) Oral every 12 hours  levETIRAcetam  IVPB 500 milliGRAM(s) IV Intermittent two times a day  melatonin 3 milliGRAM(s) Oral at bedtime  meropenem  IVPB 1000 milliGRAM(s) IV Intermittent every 8 hours  meropenem  IVPB      midodrine. 20 milliGRAM(s) Oral three times a day  pantoprazole  Injectable 40 milliGRAM(s) IV Push every 24 hours  polyethylene glycol 3350 17 Gram(s) Oral at bedtime  raloxifene 60 milliGRAM(s) Oral daily  saccharomyces boulardii 250 milliGRAM(s) Oral two times a day  senna 2 Tablet(s) Oral at bedtime  sodium chloride 0.65% Nasal 2 Spray(s) Both Nostrils three times a day  sodium chloride 0.9% Bolus 500 milliLiter(s) IV Bolus once    MEDICATIONS  (PRN):  aluminum hydroxide/magnesium hydroxide/simethicone Suspension 30 milliLiter(s) Oral every 4 hours PRN Dyspepsia  ondansetron Injectable 4 milliGRAM(s) IV Push every 8 hours PRN Nausea and/or Vomiting    Pertinent Labs:  @ 06:53: Na 139, BUN 12, Cr <0.5<L>, <H>, K+ 4.7, Phos --, Mg 2.3, Alk Phos 107, ALT/SGPT 13, AST/SGOT 14, HbA1c --    Finger Sticks:  POCT Blood Glucose.: 95 mg/dL ( @ 11:27)  POCT Blood Glucose.: 250 mg/dL ( @ 19:42)    Physical Findings:  - Appearance: nonverbal  - GI function: last BM    - Tubes: +NGT  - Oral/Mouth cavity: NPO w/ EN via NGT  - Skin: per WOCN note  - friction injuries  - Edema: 1+ generalized edema      Nutrition Requirements  Weight Used: 52.3kg -Derived from nutrition note ()      Estimated Energy Needs    Continue [x]  Adjust []  Adjusted Energy Recommendations: 1307-1493kcal/day (MSJ x 1.4-1.6)     Estimated Protein Needs    Continue [x]  Adjust []  Adjusted Protein Recommendations: 68-78gm/day (1.3-1.5gm/kg)      Estimated Fluid Needs        Continue [x]  Adjust []  Adjusted Fluid Recommendations: 1308-1569mL/day (25-30mL/kg)    Nutrient Intake: 1200 mL total volume, 1500 kcal, 81.6 gm Protein, 972 mL free water + flushes = 2572 mL total water     [x] Previous Nutrition Diagnosis:  Increased Nutrient Needs             [x] Ongoing          [] Resolved     Nutrition Intervention:  EN, coordination of care     Goal/Expected Outcome:   EN to meet >85% and <105% of estimated nutrient needs within 5-7 days      Indicator/Monitoring:   EN tolerance, BM, GI s/s, weight, labs, skin status, NFPE      Recommendation:  1) Continue current EN regimen  2) Monitor BM, GI s/s  3) Monitor electrolytes, BG, renal profile  4)Monitor for possible PEG/trach placement     Patient is at high nutrition risk, RD to f/u in 3-5 days or PRN   RD to remain available: Tania Mclean RD [phone] or via Teams

## 2024-02-27 NOTE — PROGRESS NOTE ADULT - SUBJECTIVE AND OBJECTIVE BOX
JERICHO TEA  58y Female    CHIEF COMPLAINT:    Patient is a 58y old  Female who presents with a chief complaint of Respiratory Distress (27 Feb 2024 07:04)    INTERVAL HPI/OVERNIGHT EVENTS:    Patient seen and examined. No acute events overnight. Remains on HC, overall unchanged      ROS: All other systems are negative.    Vital Signs:    T(F): 98.6 (02-27-24 @ 08:12), Max: 100 (02-26-24 @ 20:05)  HR: 90 (02-27-24 @ 09:00) (61 - 101)  BP: 90/61 (02-27-24 @ 09:00) (81/46 - 109/58)  RR: 20 (02-27-24 @ 09:00) (20 - 20)  SpO2: 99% (02-27-24 @ 09:00) (80% - 99%)    26 Feb 2024 07:01  -  27 Feb 2024 07:00  --------------------------------------------------------  IN: 900 mL / OUT: 800 mL / NET: 100 mL    POCT Blood Glucose.: 250 mg/dL (26 Feb 2024 19:42)  POCT Blood Glucose.: 112 mg/dL (26 Feb 2024 11:36)    PHYSICAL EXAM:    GENERAL:  NAD chronically ill appearing   SKIN: No rashes or lesions  HEENT: Atraumatic. Normocephalic.    NECK: Supple, No JVD.    PULMONARY: decreased breath sounds B/L. No wheezing.   CVS: Normal S1, S2. Rate and Rhythm are regular.    ABDOMEN/GI: Soft, Nontender, Nondistended.  MSK:  No clubbing or cyanosis   NEUROLOGIC: does not follow commands, opens eyes spontaneously   PSYCH: Alert & oriented x 0    Consultant(s) Notes Reviewed:  [x ] YES  [ ] NO  Care Discussed with Consultants/Other Providers [ x] YES  [ ] NO    LABS:                        8.2    13.04 )-----------( 624      ( 27 Feb 2024 06:53 )             27.7     139  |  100  |  12  ----------------------------<  177<H>  4.7   |  33<H>  |  <0.5<L>    Ca    8.2<L>      27 Feb 2024 06:53  Mg     2.3     02-27    TPro  5.9<L>  /  Alb  2.5<L>  /  TBili  <0.2  /  DBili  x   /  AST  14  /  ALT  13  /  AlkPhos  107  02-27    PT/INR - ( 27 Feb 2024 06:53 )   PT: 12.50 sec;   INR: 1.10 ratio       PTT - ( 27 Feb 2024 06:53 )  PTT:37.8 sec    Culture - Blood (collected 25 Feb 2024 11:47)  Source: .Blood None  Preliminary Report (26 Feb 2024 20:02):    No growth at 24 hours    RADIOLOGY & ADDITIONAL TESTS:  Imaging or report Personally Reviewed:  [x] YES  [ ] NO  EKG reviewed: [x] YES  [ ] NO    Medications:  Standing  acetylcysteine 20%  Inhalation 4 milliLiter(s) Inhalation every 6 hours  albuterol/ipratropium for Nebulization 3 milliLiter(s) Nebulizer every 6 hours  AQUAPHOR (petrolatum Ointment) 1 Application(s) Topical two times a day  artificial  tears Solution 1 Drop(s) Both EYES two times a day  calcium carbonate   1250 mG (OsCal) 1 Tablet(s) Oral two times a day  caspofungin IVPB 50 milliGRAM(s) IV Intermittent every 24 hours  caspofungin IVPB      chlorhexidine 2% Cloths 1 Application(s) Topical daily  chlorhexidine 2% Cloths 1 Application(s) Topical daily  enoxaparin Injectable 50 milliGRAM(s) SubCutaneous every 12 hours  gabapentin 300 milliGRAM(s) Oral every 12 hours  levETIRAcetam  IVPB 500 milliGRAM(s) IV Intermittent two times a day  melatonin 3 milliGRAM(s) Oral at bedtime  meropenem  IVPB      meropenem  IVPB 1000 milliGRAM(s) IV Intermittent every 8 hours  midodrine. 20 milliGRAM(s) Oral three times a day  pantoprazole  Injectable 40 milliGRAM(s) IV Push every 24 hours  polyethylene glycol 3350 17 Gram(s) Oral at bedtime  raloxifene 60 milliGRAM(s) Oral daily  saccharomyces boulardii 250 milliGRAM(s) Oral two times a day  senna 2 Tablet(s) Oral at bedtime  sodium chloride 0.65% Nasal 2 Spray(s) Both Nostrils three times a day  sodium chloride 0.9% Bolus 500 milliLiter(s) IV Bolus once    PRN Meds  aluminum hydroxide/magnesium hydroxide/simethicone Suspension 30 milliLiter(s) Oral every 4 hours PRN  ondansetron Injectable 4 milliGRAM(s) IV Push every 8 hours PRN

## 2024-02-27 NOTE — PROGRESS NOTE ADULT - ASSESSMENT
IMPRESSION:    Acute hypoxemic respiratory failure on NC  PNA  Sepsis POA  Septic shock off Levophed   Recurrent aspiration pneumonia / prior intubation  HO DVT on Eliquis   HO GI bleed  HO OM  HO recent duodenal perforation   HO polymicrobial bacteremia   H/o CP, DS  H/o seizures    PLAN:    CNS:  Avoid CNS Depressant, AED. MS at baseline.    HEENT: Oral care.     PULMONARY: HOB at 45 degrees.  Aspiration precaution. Wean FiO2 Keep spo2 92 TO 96%.  low flow NC    CARDIOVASCULAR: Keep MAP more than 60. Avoid overload. C/w midodrine    GI: Protonix. NG feeding.  will need PEG when more stable    INFECTIOUS DISEASE:  ABX and Anti fungal per ID.     HEMATOLOGICAL:  Lovenox therapeutic.  Monitor CBC    ENDOCRINE:  Follow up FS.  Insulin protocol if needed.    MUSCULOSKELETAL: Bedrest.  Off loading.  Wound care.    Prognosis very poor.    Palliative care following    SDU

## 2024-02-28 LAB
ALBUMIN SERPL ELPH-MCNC: 2.4 G/DL — LOW (ref 3.5–5.2)
ALP SERPL-CCNC: 96 U/L — SIGNIFICANT CHANGE UP (ref 30–115)
ALT FLD-CCNC: 12 U/L — SIGNIFICANT CHANGE UP (ref 0–41)
ANION GAP SERPL CALC-SCNC: 8 MMOL/L — SIGNIFICANT CHANGE UP (ref 7–14)
AST SERPL-CCNC: 17 U/L — SIGNIFICANT CHANGE UP (ref 0–41)
BASOPHILS # BLD AUTO: 0.08 K/UL — SIGNIFICANT CHANGE UP (ref 0–0.2)
BASOPHILS NFR BLD AUTO: 0.8 % — SIGNIFICANT CHANGE UP (ref 0–1)
BILIRUB SERPL-MCNC: <0.2 MG/DL — SIGNIFICANT CHANGE UP (ref 0.2–1.2)
BUN SERPL-MCNC: 11 MG/DL — SIGNIFICANT CHANGE UP (ref 10–20)
CALCIUM SERPL-MCNC: 8.5 MG/DL — SIGNIFICANT CHANGE UP (ref 8.4–10.4)
CHLORIDE SERPL-SCNC: 98 MMOL/L — SIGNIFICANT CHANGE UP (ref 98–110)
CO2 SERPL-SCNC: 32 MMOL/L — SIGNIFICANT CHANGE UP (ref 17–32)
CREAT SERPL-MCNC: 0.5 MG/DL — LOW (ref 0.7–1.5)
EGFR: 109 ML/MIN/1.73M2 — SIGNIFICANT CHANGE UP
EOSINOPHIL # BLD AUTO: 0.49 K/UL — SIGNIFICANT CHANGE UP (ref 0–0.7)
EOSINOPHIL NFR BLD AUTO: 4.8 % — SIGNIFICANT CHANGE UP (ref 0–8)
GLUCOSE BLDC GLUCOMTR-MCNC: 112 MG/DL — HIGH (ref 70–99)
GLUCOSE BLDC GLUCOMTR-MCNC: 128 MG/DL — HIGH (ref 70–99)
GLUCOSE BLDC GLUCOMTR-MCNC: 85 MG/DL — SIGNIFICANT CHANGE UP (ref 70–99)
GLUCOSE SERPL-MCNC: 89 MG/DL — SIGNIFICANT CHANGE UP (ref 70–99)
HCT VFR BLD CALC: 25.5 % — LOW (ref 37–47)
HGB BLD-MCNC: 7.7 G/DL — LOW (ref 12–16)
IMM GRANULOCYTES NFR BLD AUTO: 0.6 % — HIGH (ref 0.1–0.3)
LYMPHOCYTES # BLD AUTO: 2.06 K/UL — SIGNIFICANT CHANGE UP (ref 1.2–3.4)
LYMPHOCYTES # BLD AUTO: 20.3 % — LOW (ref 20.5–51.1)
MAGNESIUM SERPL-MCNC: 2.3 MG/DL — SIGNIFICANT CHANGE UP (ref 1.8–2.4)
MCHC RBC-ENTMCNC: 23.4 PG — LOW (ref 27–31)
MCHC RBC-ENTMCNC: 30.2 G/DL — LOW (ref 32–37)
MCV RBC AUTO: 77.5 FL — LOW (ref 81–99)
MONOCYTES # BLD AUTO: 0.56 K/UL — SIGNIFICANT CHANGE UP (ref 0.1–0.6)
MONOCYTES NFR BLD AUTO: 5.5 % — SIGNIFICANT CHANGE UP (ref 1.7–9.3)
NEUTROPHILS # BLD AUTO: 6.88 K/UL — HIGH (ref 1.4–6.5)
NEUTROPHILS NFR BLD AUTO: 68 % — SIGNIFICANT CHANGE UP (ref 42.2–75.2)
NRBC # BLD: 0 /100 WBCS — SIGNIFICANT CHANGE UP (ref 0–0)
PLATELET # BLD AUTO: 607 K/UL — HIGH (ref 130–400)
PMV BLD: 10 FL — SIGNIFICANT CHANGE UP (ref 7.4–10.4)
POTASSIUM SERPL-MCNC: 4.7 MMOL/L — SIGNIFICANT CHANGE UP (ref 3.5–5)
POTASSIUM SERPL-SCNC: 4.7 MMOL/L — SIGNIFICANT CHANGE UP (ref 3.5–5)
PROT SERPL-MCNC: 5.9 G/DL — LOW (ref 6–8)
RBC # BLD: 3.29 M/UL — LOW (ref 4.2–5.4)
RBC # FLD: 21.4 % — HIGH (ref 11.5–14.5)
SODIUM SERPL-SCNC: 138 MMOL/L — SIGNIFICANT CHANGE UP (ref 135–146)
WBC # BLD: 10.13 K/UL — SIGNIFICANT CHANGE UP (ref 4.8–10.8)
WBC # FLD AUTO: 10.13 K/UL — SIGNIFICANT CHANGE UP (ref 4.8–10.8)

## 2024-02-28 PROCEDURE — 71045 X-RAY EXAM CHEST 1 VIEW: CPT | Mod: 26

## 2024-02-28 PROCEDURE — 99233 SBSQ HOSP IP/OBS HIGH 50: CPT

## 2024-02-28 PROCEDURE — 99232 SBSQ HOSP IP/OBS MODERATE 35: CPT

## 2024-02-28 RX ADMIN — GABAPENTIN 300 MILLIGRAM(S): 400 CAPSULE ORAL at 05:06

## 2024-02-28 RX ADMIN — MIDODRINE HYDROCHLORIDE 20 MILLIGRAM(S): 2.5 TABLET ORAL at 17:12

## 2024-02-28 RX ADMIN — Medication 2 SPRAY(S): at 05:07

## 2024-02-28 RX ADMIN — Medication 2 SPRAY(S): at 13:36

## 2024-02-28 RX ADMIN — Medication 3 MILLIGRAM(S): at 21:14

## 2024-02-28 RX ADMIN — PANTOPRAZOLE SODIUM 40 MILLIGRAM(S): 20 TABLET, DELAYED RELEASE ORAL at 05:05

## 2024-02-28 RX ADMIN — Medication 2 SPRAY(S): at 21:14

## 2024-02-28 RX ADMIN — LEVETIRACETAM 400 MILLIGRAM(S): 250 TABLET, FILM COATED ORAL at 17:16

## 2024-02-28 RX ADMIN — Medication 1 DROP(S): at 17:14

## 2024-02-28 RX ADMIN — Medication 1 TABLET(S): at 05:08

## 2024-02-28 RX ADMIN — Medication 1 TABLET(S): at 17:13

## 2024-02-28 RX ADMIN — GABAPENTIN 300 MILLIGRAM(S): 400 CAPSULE ORAL at 17:13

## 2024-02-28 RX ADMIN — CASPOFUNGIN ACETATE 260 MILLIGRAM(S): 7 INJECTION, POWDER, LYOPHILIZED, FOR SOLUTION INTRAVENOUS at 11:53

## 2024-02-28 RX ADMIN — MEROPENEM 100 MILLIGRAM(S): 1 INJECTION INTRAVENOUS at 21:14

## 2024-02-28 RX ADMIN — ENOXAPARIN SODIUM 50 MILLIGRAM(S): 100 INJECTION SUBCUTANEOUS at 17:11

## 2024-02-28 RX ADMIN — ENOXAPARIN SODIUM 50 MILLIGRAM(S): 100 INJECTION SUBCUTANEOUS at 05:04

## 2024-02-28 RX ADMIN — Medication 250 MILLIGRAM(S): at 05:06

## 2024-02-28 RX ADMIN — MIDODRINE HYDROCHLORIDE 20 MILLIGRAM(S): 2.5 TABLET ORAL at 05:06

## 2024-02-28 RX ADMIN — CHLORHEXIDINE GLUCONATE 1 APPLICATION(S): 213 SOLUTION TOPICAL at 12:02

## 2024-02-28 RX ADMIN — Medication 1 APPLICATION(S): at 05:07

## 2024-02-28 RX ADMIN — MEROPENEM 100 MILLIGRAM(S): 1 INJECTION INTRAVENOUS at 13:35

## 2024-02-28 RX ADMIN — LEVETIRACETAM 400 MILLIGRAM(S): 250 TABLET, FILM COATED ORAL at 05:05

## 2024-02-28 RX ADMIN — Medication 4 MILLILITER(S): at 13:06

## 2024-02-28 RX ADMIN — Medication 250 MILLIGRAM(S): at 17:13

## 2024-02-28 RX ADMIN — Medication 3 MILLILITER(S): at 13:06

## 2024-02-28 RX ADMIN — Medication 1 DROP(S): at 05:07

## 2024-02-28 RX ADMIN — MEROPENEM 100 MILLIGRAM(S): 1 INJECTION INTRAVENOUS at 05:05

## 2024-02-28 RX ADMIN — MIDODRINE HYDROCHLORIDE 20 MILLIGRAM(S): 2.5 TABLET ORAL at 11:59

## 2024-02-28 RX ADMIN — Medication 1 APPLICATION(S): at 17:14

## 2024-02-28 RX ADMIN — RALOXIFENE HYDROCHLORIDE 60 MILLIGRAM(S): 60 TABLET, COATED ORAL at 11:58

## 2024-02-28 NOTE — PROGRESS NOTE ADULT - SUBJECTIVE AND OBJECTIVE BOX
Over Night Events: Events noted, on HHFNC, afebrile    PHYSICAL EXAM    ICU Vital Signs Last 24 Hrs  T(C): 36.7 (28 Feb 2024 04:00), Max: 37.1 (27 Feb 2024 16:00)  T(F): 98 (28 Feb 2024 04:00), Max: 98.7 (27 Feb 2024 16:00)  HR: 90 (28 Feb 2024 04:00) (65 - 109)  BP: 111/57 (28 Feb 2024 04:00) (84/49 - 121/58)  BP(mean): 78 (28 Feb 2024 04:00) (61 - 83)  RR: 18 (28 Feb 2024 04:00) (18 - 20)  SpO2: 100% (28 Feb 2024 04:00) (94% - 100%)    O2 Parameters below as of 28 Feb 2024 04:00  Patient On (Oxygen Delivery Method): nasal cannula, high flow  O2 Flow (L/min): 40  O2 Concentration (%): 70        General: ill looking  Lungs: Bilateral rhonchi  Cardiovascular: Regular   Abdomen: Soft, Positive BS  Extremities: No clubbing   not following commads    02-27-24 @ 07:01  -  02-28-24 @ 07:00  --------------------------------------------------------  IN:    Enteral Tube Flush: 630 mL    IV PiggyBack: 150 mL    Jevity 1.2: 1200 mL  Total IN: 1980 mL    OUT:    Voided (mL): 850 mL  Total OUT: 850 mL    Total NET: 1130 mL          LABS:                          8.2    13.04 )-----------( 624      ( 27 Feb 2024 06:53 )             27.7                                               02-27    139  |  100  |  12  ----------------------------<  177<H>  4.7   |  33<H>  |  <0.5<L>    Ca    8.2<L>      27 Feb 2024 06:53  Mg     2.3     02-27    TPro  5.9<L>  /  Alb  2.5<L>  /  TBili  <0.2  /  DBili  x   /  AST  14  /  ALT  13  /  AlkPhos  107  02-27      PT/INR - ( 27 Feb 2024 06:53 )   PT: 12.50 sec;   INR: 1.10 ratio         PTT - ( 27 Feb 2024 06:53 )  PTT:37.8 sec                                       Urinalysis Basic - ( 27 Feb 2024 06:53 )    Color: x / Appearance: x / SG: x / pH: x  Gluc: 177 mg/dL / Ketone: x  / Bili: x / Urobili: x   Blood: x / Protein: x / Nitrite: x   Leuk Esterase: x / RBC: x / WBC x   Sq Epi: x / Non Sq Epi: x / Bacteria: x                                                  LIVER FUNCTIONS - ( 27 Feb 2024 06:53 )  Alb: 2.5 g/dL / Pro: 5.9 g/dL / ALK PHOS: 107 U/L / ALT: 13 U/L / AST: 14 U/L / GGT: x                                                  Culture - Blood (collected 25 Feb 2024 11:47)  Source: .Blood None  Preliminary Report (27 Feb 2024 20:01):    No growth at 48 Hours                                                                                           MEDICATIONS  (STANDING):  acetylcysteine 20%  Inhalation 4 milliLiter(s) Inhalation every 6 hours  albuterol/ipratropium for Nebulization 3 milliLiter(s) Nebulizer every 6 hours  AQUAPHOR (petrolatum Ointment) 1 Application(s) Topical two times a day  artificial  tears Solution 1 Drop(s) Both EYES two times a day  calcium carbonate   1250 mG (OsCal) 1 Tablet(s) Oral two times a day  caspofungin IVPB 50 milliGRAM(s) IV Intermittent every 24 hours  caspofungin IVPB      chlorhexidine 2% Cloths 1 Application(s) Topical daily  chlorhexidine 2% Cloths 1 Application(s) Topical daily  enoxaparin Injectable 50 milliGRAM(s) SubCutaneous every 12 hours  gabapentin 300 milliGRAM(s) Oral every 12 hours  levETIRAcetam  IVPB 500 milliGRAM(s) IV Intermittent two times a day  melatonin 3 milliGRAM(s) Oral at bedtime  meropenem  IVPB      meropenem  IVPB 1000 milliGRAM(s) IV Intermittent every 8 hours  midodrine. 20 milliGRAM(s) Oral three times a day  pantoprazole  Injectable 40 milliGRAM(s) IV Push every 24 hours  polyethylene glycol 3350 17 Gram(s) Oral at bedtime  raloxifene 60 milliGRAM(s) Oral daily  saccharomyces boulardii 250 milliGRAM(s) Oral two times a day  senna 2 Tablet(s) Oral at bedtime  sodium chloride 0.65% Nasal 2 Spray(s) Both Nostrils three times a day  sodium chloride 0.9% Bolus 500 milliLiter(s) IV Bolus once    MEDICATIONS  (PRN):  aluminum hydroxide/magnesium hydroxide/simethicone Suspension 30 milliLiter(s) Oral every 4 hours PRN Dyspepsia  ondansetron Injectable 4 milliGRAM(s) IV Push every 8 hours PRN Nausea and/or Vomiting    cxr noted

## 2024-02-28 NOTE — PROGRESS NOTE ADULT - SUBJECTIVE AND OBJECTIVE BOX
Podiatry Progress Note    Subjective:  TEA ECHAVARRIA is a  58y Female.   Seen bedside.   Patient is a 58y old  Female who presents with a chief complaint of Respiratory Distress (28 Feb 2024 07:01)      Past Medical History and Surgical History  PAST MEDICAL & SURGICAL HISTORY:  Down syndrome      Osteoporosis      Mild anemia      Neuropathy      S/P debridement  of R hip on 3/2/21           Objective:  Vital Signs Last 24 Hrs  T(C): 36.6 (28 Feb 2024 08:11), Max: 37.1 (27 Feb 2024 16:00)  T(F): 97.9 (28 Feb 2024 08:11), Max: 98.7 (27 Feb 2024 16:00)  HR: 61 (28 Feb 2024 08:11) (61 - 109)  BP: 90/48 (28 Feb 2024 08:11) (84/49 - 121/58)  BP(mean): 66 (28 Feb 2024 08:11) (61 - 83)  RR: 20 (28 Feb 2024 08:11) (18 - 20)  SpO2: 100% (28 Feb 2024 08:11) (95% - 100%)    Parameters below as of 28 Feb 2024 08:11  Patient On (Oxygen Delivery Method): nasal cannula, high flow  O2 Flow (L/min): 40  O2 Concentration (%): 70                        7.7    10.13 )-----------( 607      ( 28 Feb 2024 06:25 )             25.5                 02-28    138  |  98  |  11  ----------------------------<  89  4.7   |  32  |  0.5<L>    Ca    8.5      28 Feb 2024 06:25  Mg     2.3     02-28    TPro  5.9<L>  /  Alb  2.4<L>  /  TBili  <0.2  /  DBili  x   /  AST  17  /  ALT  12  /  AlkPhos  96  02-28      Physical Exam - Lower Extremity Focused:   Derm: Full thickness ulceration to R heel with necrotic wound base. Left foot superficial wounds no signs of infection  Vascular: DP and PT Pulses Diminished; Foot is Warm to Warm to the touch; Capillary Refill Time < 3 Seconds;    Neuro: Protective Sensation Diminished / Moderately Neuropathic   MSK: Pain On Palpation at Wound Site     Assessment:  Ulceration - Right Heel  Superficial Wounds - Left Foot    Plan:  Chart reviewed and Patient evaluated. All Questions and Concerns Addressed and Answered  XR Imaging  Foot; Reviewed.   Local Wound Care;   -Right: Betadine, gauze, abd, kerlix secured with tape.   -Left: Allevyn Pad   Weight Bearing Status; WBAT  No surgical intervention at this time; will continue with local wound care.   Will continue to monitor while inpatient.   Discussed Plan w/ Dr Everett

## 2024-02-28 NOTE — PROGRESS NOTE ADULT - SUBJECTIVE AND OBJECTIVE BOX
24H events:    Patient is a 58y old Female who presents with a chief complaint of Respiratory Distress (28 Feb 2024 12:32)  Primary diagnosis of Sepsis with acute hypoxic respiratory failure  Today is hospital day 39d. This morning patient was seen and examined at bedside, resting comfortably in bed.    No acute or major events overnight. Hemodynamically stable, tolerating oral diet, voiding appropriately with appropriate bowel movements.     Code Status:    Family communication:  Contact date:  Name of person contacted:  Relationship to patient:  Communication details:  What matters most:    PAST MEDICAL & SURGICAL HISTORY  Down syndrome    Osteoporosis    Mild anemia    Neuropathy    S/P debridement  of R hip on 3/2/21      SOCIAL HISTORY:  Social History:      ALLERGIES:  No Known Allergies    MEDICATIONS:  STANDING MEDICATIONS  acetylcysteine 20%  Inhalation 4 milliLiter(s) Inhalation every 6 hours  albuterol/ipratropium for Nebulization 3 milliLiter(s) Nebulizer every 6 hours  AQUAPHOR (petrolatum Ointment) 1 Application(s) Topical two times a day  artificial  tears Solution 1 Drop(s) Both EYES two times a day  calcium carbonate   1250 mG (OsCal) 1 Tablet(s) Oral two times a day  caspofungin IVPB      caspofungin IVPB 50 milliGRAM(s) IV Intermittent every 24 hours  chlorhexidine 2% Cloths 1 Application(s) Topical daily  chlorhexidine 2% Cloths 1 Application(s) Topical daily  enoxaparin Injectable 50 milliGRAM(s) SubCutaneous every 12 hours  gabapentin 300 milliGRAM(s) Oral every 12 hours  levETIRAcetam  IVPB 500 milliGRAM(s) IV Intermittent two times a day  melatonin 3 milliGRAM(s) Oral at bedtime  meropenem  IVPB 1000 milliGRAM(s) IV Intermittent every 8 hours  meropenem  IVPB      midodrine. 20 milliGRAM(s) Oral three times a day  pantoprazole  Injectable 40 milliGRAM(s) IV Push every 24 hours  polyethylene glycol 3350 17 Gram(s) Oral at bedtime  raloxifene 60 milliGRAM(s) Oral daily  saccharomyces boulardii 250 milliGRAM(s) Oral two times a day  senna 2 Tablet(s) Oral at bedtime  sodium chloride 0.65% Nasal 2 Spray(s) Both Nostrils three times a day  sodium chloride 0.9% Bolus 500 milliLiter(s) IV Bolus once    PRN MEDICATIONS  aluminum hydroxide/magnesium hydroxide/simethicone Suspension 30 milliLiter(s) Oral every 4 hours PRN  ondansetron Injectable 4 milliGRAM(s) IV Push every 8 hours PRN    VITALS:   T(F): 97.9  HR: 61  BP: 90/48  RR: 20  SpO2: 100%    PHYSICAL EXAM:    Gen: NAD, resting in bed  HEENT: Normocephalic, atraumatic  Neck: supple, no lymphadenopathy  CV: Regular rate & regular rhythm  Lungs: decrease bs b/l, no wheeze, on HFNC  Abdomen: Soft, NTND+ BS present, NGT  Ext: Warm, well perfused no CCE  Neuro: non focal, awake, does not follow commands  Skin: no rash, no erythema on extremeties    (  ) Indwelling Madsen Catheter:   Date insterted:    Reason (  ) Critical illness     (  ) urinary retention    (  ) Accurate Ins/Outs Monitoring     (  ) CMO patient    (  ) Central Line:   Date inserted:  Location: (  ) Right IJ     (  ) Left IJ     (  ) Right Fem     (  ) Left Fem    (  ) SPC        (  ) pigtail       (  ) PEG tube       (  ) colostomy       (  ) jejunostomy  (  ) U-Dall    LABS:                        7.7    10.13 )-----------( 607      ( 28 Feb 2024 06:25 )             25.5     02-28    138  |  98  |  11  ----------------------------<  89  4.7   |  32  |  0.5<L>    Ca    8.5      28 Feb 2024 06:25  Mg     2.3     02-28    TPro  5.9<L>  /  Alb  2.4<L>  /  TBili  <0.2  /  DBili  x   /  AST  17  /  ALT  12  /  AlkPhos  96  02-28    PT/INR - ( 27 Feb 2024 06:53 )   PT: 12.50 sec;   INR: 1.10 ratio         PTT - ( 27 Feb 2024 06:53 )  PTT:37.8 sec  Urinalysis Basic - ( 28 Feb 2024 06:25 )    Color: x / Appearance: x / SG: x / pH: x  Gluc: 89 mg/dL / Ketone: x  / Bili: x / Urobili: x   Blood: x / Protein: x / Nitrite: x   Leuk Esterase: x / RBC: x / WBC x   Sq Epi: x / Non Sq Epi: x / Bacteria: x                RADIOLOGY:    RADIOLOGY

## 2024-02-28 NOTE — PROGRESS NOTE ADULT - ASSESSMENT
58F w/ PMHx Down Syndrome, nonverbal at baseline, Hypothyroidism, Cerebral palsy and Seizure Disorders presents to the ED from nursing facility/group home ([facility]) presented to ED for respiratory distress and high grade fever. Patient found to be septic on admission. Admitted to SDU for management of acute respiratory distress 2/2 CAP/Aspiration PNA (20 Jan 2024 18:22)  While pt was on the floor she developed dyspnea after swallow evaluation, was spiking high grade fever, consulted by pulmonary/critical care and was upgraded back to SDU.      A/P  # Sepsis POA / Acute hypoxic resp failure / RSV bronchiolitis /  H/o Dysphagia/ suspected aspiration    - off levophed, on high flow oxygen now, taper off as tolerated   - continue midodrine 20 Q8H  - continue caspo, danna until 3/10 per ID   - Failed FEES,  NG tube for feedings and medications, patient will need PEG tube once 02 requirements decrease  - c/w duoneb, chest PT, aspiration precautions   - pulmonary is following  - aggressive chest PT with vest, aspiration precautions and suction       Elevated Troponin , type 2 MI/  Sinus tachycardia  - c/w telemetry monitoring   - Repeat EKG: Normal sinus rhythm  - c/w  metoprolol  - TTE 2/19 normal EF no DD or valve abnormalities    # Seizure Disorder  - c/w  Keppra   - seizure precautions  - keep Mg above 2.0     # H/o lower ext DVT   - c/w therapeutic Lovenox, Eliquis on dc    #  Hypothyroidism  - TSH 0.51    # Normocytic Anemia, anemia of chronic disease   - monitor H/H, keep Hb above 7.5     # Down Syndrome/  Cerebral Palsy/ functional quadriplegia   - supportive care  - prevent falls and aspiration   - failed speech and swallow, needs PEG as above  - on Raloxifene     Full code, palliative following for ongoing GOC, [facility], paperwork to discuss code status faxed to [facility]     Pending: taper down supplemental 02, recall GI once o2 requirements decrease for PEG, palliative care follow up

## 2024-02-28 NOTE — PROGRESS NOTE ADULT - ASSESSMENT
IMPRESSION:    Acute hypoxemic respiratory failure on HHFNC  PNA  Sepsis POA  Septic shock off Levophed   Recurrent aspiration pneumonia / prior intubation  HO DVT on Eliquis   HO GI bleed  HO OM  HO recent duodenal perforation   HO polymicrobial bacteremia   H/o CP, DS  H/o seizures    PLAN:    CNS:  Avoid CNS Depressant, AED. MS at baseline.    HEENT: Oral care.     PULMONARY: HOB at 45 degrees.  Aspiration precaution. Wean FiO2 Keep spo2 92 TO 96%.     CARDIOVASCULAR: Keep MAP more than 60. Avoid overload. C/w midodrine    GI: Protonix. NG feeding.  will need PEG when more stable    INFECTIOUS DISEASE:  ABX and Anti fungal per ID.     HEMATOLOGICAL:  Lovenox therapeutic.  Monitor CBC    ENDOCRINE:  Follow up FS.  Insulin protocol if needed.    MUSCULOSKELETAL: Bedrest.  Off loading.  Wound care.    Prognosis very poor.    Palliative care following    SDU

## 2024-02-28 NOTE — PROGRESS NOTE ADULT - ASSESSMENT
58F w/ PMHx Down Syndrome, nonverbal at baseline, Hypothyroidism, Cerebral palsy and Seizure Disorders presents to the ED from nursing facility/group home ([facility]) presented to ED for respiratory distress and high grade fever. Patient found to be septic on admission. Admitted to SDU for management of acute respiratory distress 2/2 CAP/Aspiration PNA (20 Jan 2024 18:22)  While pt was on the floor she developed dyspnea after swallow evaluation, was spiking high grade fever, consulted by pulmonary/critical care and was upgraded back to SDU.    # Sepsis POA / Acute hypoxic resp failure / RSV bronchiolitis /  H/o Dysphagia/ suspected aspiration PNA /high grade fever   - off levophed   - continue midodrine   - continue caspo, danna   - ID following   - Failed FEES, put NG tube for feedings and medications, pt'll likely need PEG/trach when stable, consulted by GI   - supplement oxygen, monitor pulse Ox, on high flow oxygen for weeks   - c/w duoneb  - pulmonary is following, recommendations noted  - aggressive chest PT with vest, aspiration precautions and suction   - continue caspo, danna until 3/10 per ID       # Elevated Troponin , type 2 MI/  Sinus tachycardia  - c/w telemetry monitoring   - Repeat EKG: Normal sinus rhythm  - c/w  metoprolol  -  maintain fluid balance   - TTE 2/19 normal EF no DD or valve abnormalities    # Seizure Disorder  - c/w  Keppra   - seizure precautions  - keep Mg above 2.0     # H/o lower ext DVT  - eliquis to lovenox     # Hypothyroidism  - Thyroid Stimulating Hormone, Serum: 0.51 uIU/mL (01.21.24 @ 06:34)    # Normocytic Anemia, anemia of chronic disease   - monitor H/H, keep Hb above 7.5     # Down Syndrome/  Cerebral Palsy  - supportive care  - prevent falls and aspiration   - failed speech and swallow, needs PEG   - on Raloxifene     #Supportive Management:  Dispo: sdu  DVT Ppx: enoxaparin Injectable 50 milliGRAM(s) SubCutaneous every 12 hours  GI Ppx: pantoprazole  Injectable 40 milliGRAM(s) IV Push every 24 hours  Diet:  Diet, NPO with Tube Feed:   Tube Feeding Modality: Nasogastric  Jevity 1.2 Juventino      # Full code  - conversation started with patient's representative and progress note faxed to start considering DNR/DNI. for now full code.   La Nena Vasquez [phone]  2/28/24 called again this AM, no answer, left voicemail w/contact number for call back     Full code, palliative following for ongoing GOC, pt is [facility], paperwork to discuss code status faxed to CAB     Pending: taper down supplemental 02, recall GI once o2 requirements decrease for PEG, fu palliative

## 2024-02-28 NOTE — PROGRESS NOTE ADULT - SUBJECTIVE AND OBJECTIVE BOX
58F w/ PMHx Down Syndrome, nonverbal at baseline, Hypothyroidism, Cerebral palsy and Seizure Disorders presents to the ED from nursing facility/group home ([facility]) presented to ED for respiratory distress and high grade fever. Patient found to be septic on admission.Admitted to SDU for management of acute respiratory distress 2/2 CAP/Aspiration PNA (20 Jan 2024 18:22)  While pt was on the floor she developed dyspnea after swallow evaluation, was spiking high grade fever, consulted by pulmonary/critical care and was upgraded back to SDU.  Pt developed left lung white out, improved after aggressive chest PT, treated with Meropenem and caspofungin, ID is following, her overall prognosis is very poor, she might need trach and PEG in the nearest future , a meeting with facility staff and state took place on 2/14.   Pt completed course o Meropenem, Levofloxacin added on 2/15 ( pt spiked fever), Meropenem resumed on 2/18,  she was tapered off pressure support, requires  high flow oxygen fo a while in SDU  Hb dropped without acute blood loss, will  monitor closely ( pt is o full AC). Now pt is off pressure support.   Today pt is awake, nonverbal, on high flow oxygen.     PAST MEDICAL & SURGICAL HISTORY:  Down syndrome  Osteoporosis  Mild anemia  Neuropathy  S/P debridement  of R hip on 3/2/21      Vital Signs:  T(C): 37.1 (28 Feb 2024 16:00), Max: 37.1 (28 Feb 2024 16:00)  T(F): 98.8 (28 Feb 2024 16:00), Max: 98.8 (28 Feb 2024 16:00)  HR: 66 (28 Feb 2024 16:00) (61 - 90)  BP: 84/43 (28 Feb 2024 16:00) (84/43 - 121/58)  BP(mean): 59 (28 Feb 2024 16:00) (59 - 83)  RR: 20 (28 Feb 2024 16:00) (18 - 20)  SpO2: 100% (28 Feb 2024 16:00) (100% - 100%)    Parameters below as of 28 Feb 2024 16:00  Patient On (Oxygen Delivery Method): nasal cannula, high flow      PHYSICAL EXAM:  GENERAL:  NAD chronically ill appearing, mentally challenged   SKIN: No rashes or lesions  HEENT: Atraumatic. Normocephalic   NECK: Supple, No JVD.    PULMONARY: decreased breath sounds B/L, poor air entry   CVS: Normal S1, S2. Rate and Rhythm are regular.    ABDOMEN/GI: Soft, Nontender, Nondistended.  MSK:  contracted LE   NEUROLOGIC: does not follow commands, opens eyes spontaneously   PSYCH: Alert & oriented x 0    Consultant(s) Notes Reviewed:  [x ] YES  [ ] NO  Care Discussed with Consultants/Other Providers [ x] YES  [ ] NO    LABS:                                   7.7    10.13 )-----------( 607      ( 28 Feb 2024 06:25 )             25.5   02-28    138  |  98  |  11  ----------------------------<  89  4.7   |  32  |  0.5<L>    Ca    8.5      28 Feb 2024 06:25  Mg     2.3     02-28    TPro  5.9<L>  /  Alb  2.4<L>  /  TBili  <0.2  /  DBili  x   /  AST  17  /  ALT  12  /  AlkPhos  96  02-28      Culture - Blood (collected 25 Feb 2024 11:47)  Source: .Blood None  Preliminary Report (26 Feb 2024 20:02):    No growth at 24 hours        RADIOLOGY & ADDITIONAL TESTS:  Imaging or report Personally Reviewed:  [x] YES  [ ] NO  EKG reviewed: [x] YES  [ ] NO    < from: Xray Chest 1 View- PORTABLE-Routine (02.28.24 @ 05:32) >    Impression:    Bilateral opacifications, left greater than right, stable. Support   devices as described.    < end of copied text >  < from: CT Chest w/ IV Cont (02.21.24 @ 00:09) >  Impression:    Bilateral pneumonia, left worse than right, with a left upper lobe   thick-walled cavity.    Enteric catheter as described. Note that by the time that this report was   dictated, this issue had been tended to..    < end of copied text >  < from: CT Abdomen and Pelvis w/ IV Cont (02.21.24 @ 00:10) >    IMPRESSION:  1.  No CT evidence of an acuteabdominopelvic pathology.    See separately dictated CT chest report for intrathoracic findings.    < end of copied text >        MEDICATIONS  (STANDING):  acetylcysteine 20%  Inhalation 4 milliLiter(s) Inhalation every 6 hours  albuterol/ipratropium for Nebulization 3 milliLiter(s) Nebulizer every 6 hours  AQUAPHOR (petrolatum Ointment) 1 Application(s) Topical two times a day  artificial  tears Solution 1 Drop(s) Both EYES two times a day  calcium carbonate   1250 mG (OsCal) 1 Tablet(s) Oral two times a day  caspofungin IVPB      caspofungin IVPB 50 milliGRAM(s) IV Intermittent every 24 hours  chlorhexidine 2% Cloths 1 Application(s) Topical daily  chlorhexidine 2% Cloths 1 Application(s) Topical daily  enoxaparin Injectable 50 milliGRAM(s) SubCutaneous every 12 hours  gabapentin 300 milliGRAM(s) Oral every 12 hours  levETIRAcetam  IVPB 500 milliGRAM(s) IV Intermittent two times a day  melatonin 3 milliGRAM(s) Oral at bedtime  meropenem  IVPB      meropenem  IVPB 1000 milliGRAM(s) IV Intermittent every 8 hours  midodrine. 20 milliGRAM(s) Oral three times a day  pantoprazole  Injectable 40 milliGRAM(s) IV Push every 24 hours  polyethylene glycol 3350 17 Gram(s) Oral at bedtime  raloxifene 60 milliGRAM(s) Oral daily  saccharomyces boulardii 250 milliGRAM(s) Oral two times a day  senna 2 Tablet(s) Oral at bedtime  sodium chloride 0.65% Nasal 2 Spray(s) Both Nostrils three times a day  sodium chloride 0.9% Bolus 500 milliLiter(s) IV Bolus once    MEDICATIONS  (PRN):  aluminum hydroxide/magnesium hydroxide/simethicone Suspension 30 milliLiter(s) Oral every 4 hours PRN Dyspepsia  ondansetron Injectable 4 milliGRAM(s) IV Push every 8 hours PRN Nausea and/or Vomiting

## 2024-02-28 NOTE — PROGRESS NOTE ADULT - ATTENDING COMMENTS
agree with above note      Patient to be monitored      Bilateral Ulcerations stable for now BP well controlled  c/w home med of lisinopril

## 2024-02-29 LAB
ALBUMIN SERPL ELPH-MCNC: 2.7 G/DL — LOW (ref 3.5–5.2)
ALP SERPL-CCNC: 106 U/L — SIGNIFICANT CHANGE UP (ref 30–115)
ALT FLD-CCNC: 12 U/L — SIGNIFICANT CHANGE UP (ref 0–41)
ANION GAP SERPL CALC-SCNC: 8 MMOL/L — SIGNIFICANT CHANGE UP (ref 7–14)
AST SERPL-CCNC: 14 U/L — SIGNIFICANT CHANGE UP (ref 0–41)
BASOPHILS # BLD AUTO: 0.12 K/UL — SIGNIFICANT CHANGE UP (ref 0–0.2)
BASOPHILS NFR BLD AUTO: 0.8 % — SIGNIFICANT CHANGE UP (ref 0–1)
BILIRUB SERPL-MCNC: <0.2 MG/DL — SIGNIFICANT CHANGE UP (ref 0.2–1.2)
BUN SERPL-MCNC: 12 MG/DL — SIGNIFICANT CHANGE UP (ref 10–20)
CALCIUM SERPL-MCNC: 8.6 MG/DL — SIGNIFICANT CHANGE UP (ref 8.4–10.5)
CHLORIDE SERPL-SCNC: 101 MMOL/L — SIGNIFICANT CHANGE UP (ref 98–110)
CO2 SERPL-SCNC: 32 MMOL/L — SIGNIFICANT CHANGE UP (ref 17–32)
CREAT SERPL-MCNC: 0.5 MG/DL — LOW (ref 0.7–1.5)
EGFR: 109 ML/MIN/1.73M2 — SIGNIFICANT CHANGE UP
EOSINOPHIL # BLD AUTO: 0.35 K/UL — SIGNIFICANT CHANGE UP (ref 0–0.7)
EOSINOPHIL NFR BLD AUTO: 2.4 % — SIGNIFICANT CHANGE UP (ref 0–8)
GLUCOSE BLDC GLUCOMTR-MCNC: 117 MG/DL — HIGH (ref 70–99)
GLUCOSE BLDC GLUCOMTR-MCNC: 143 MG/DL — HIGH (ref 70–99)
GLUCOSE BLDC GLUCOMTR-MCNC: 144 MG/DL — HIGH (ref 70–99)
GLUCOSE BLDC GLUCOMTR-MCNC: 147 MG/DL — HIGH (ref 70–99)
GLUCOSE SERPL-MCNC: 150 MG/DL — HIGH (ref 70–99)
HCT VFR BLD CALC: 26.5 % — LOW (ref 37–47)
HGB BLD-MCNC: 7.9 G/DL — LOW (ref 12–16)
IMM GRANULOCYTES NFR BLD AUTO: 0.6 % — HIGH (ref 0.1–0.3)
LYMPHOCYTES # BLD AUTO: 16.1 % — LOW (ref 20.5–51.1)
LYMPHOCYTES # BLD AUTO: 2.33 K/UL — SIGNIFICANT CHANGE UP (ref 1.2–3.4)
MAGNESIUM SERPL-MCNC: 2.3 MG/DL — SIGNIFICANT CHANGE UP (ref 1.8–2.4)
MCHC RBC-ENTMCNC: 23.4 PG — LOW (ref 27–31)
MCHC RBC-ENTMCNC: 29.8 G/DL — LOW (ref 32–37)
MCV RBC AUTO: 78.4 FL — LOW (ref 81–99)
MONOCYTES # BLD AUTO: 0.71 K/UL — HIGH (ref 0.1–0.6)
MONOCYTES NFR BLD AUTO: 4.9 % — SIGNIFICANT CHANGE UP (ref 1.7–9.3)
NEUTROPHILS # BLD AUTO: 10.89 K/UL — HIGH (ref 1.4–6.5)
NEUTROPHILS NFR BLD AUTO: 75.2 % — SIGNIFICANT CHANGE UP (ref 42.2–75.2)
NRBC # BLD: 0 /100 WBCS — SIGNIFICANT CHANGE UP (ref 0–0)
PLATELET # BLD AUTO: 635 K/UL — HIGH (ref 130–400)
PMV BLD: 10.1 FL — SIGNIFICANT CHANGE UP (ref 7.4–10.4)
POTASSIUM SERPL-MCNC: 4.8 MMOL/L — SIGNIFICANT CHANGE UP (ref 3.5–5)
POTASSIUM SERPL-SCNC: 4.8 MMOL/L — SIGNIFICANT CHANGE UP (ref 3.5–5)
PROT SERPL-MCNC: 6.4 G/DL — SIGNIFICANT CHANGE UP (ref 6–8)
RBC # BLD: 3.38 M/UL — LOW (ref 4.2–5.4)
RBC # FLD: 21.1 % — HIGH (ref 11.5–14.5)
SODIUM SERPL-SCNC: 141 MMOL/L — SIGNIFICANT CHANGE UP (ref 135–146)
WBC # BLD: 14.48 K/UL — HIGH (ref 4.8–10.8)
WBC # FLD AUTO: 14.48 K/UL — HIGH (ref 4.8–10.8)

## 2024-02-29 PROCEDURE — 99233 SBSQ HOSP IP/OBS HIGH 50: CPT

## 2024-02-29 PROCEDURE — 71045 X-RAY EXAM CHEST 1 VIEW: CPT | Mod: 26

## 2024-02-29 PROCEDURE — 99497 ADVNCD CARE PLAN 30 MIN: CPT

## 2024-02-29 PROCEDURE — 99498 ADVNCD CARE PLAN ADDL 30 MIN: CPT

## 2024-02-29 RX ADMIN — PANTOPRAZOLE SODIUM 40 MILLIGRAM(S): 20 TABLET, DELAYED RELEASE ORAL at 05:03

## 2024-02-29 RX ADMIN — Medication 250 MILLIGRAM(S): at 05:04

## 2024-02-29 RX ADMIN — Medication 2 SPRAY(S): at 22:31

## 2024-02-29 RX ADMIN — MEROPENEM 100 MILLIGRAM(S): 1 INJECTION INTRAVENOUS at 14:25

## 2024-02-29 RX ADMIN — MEROPENEM 100 MILLIGRAM(S): 1 INJECTION INTRAVENOUS at 22:31

## 2024-02-29 RX ADMIN — Medication 1 TABLET(S): at 05:04

## 2024-02-29 RX ADMIN — Medication 3 MILLILITER(S): at 23:02

## 2024-02-29 RX ADMIN — CASPOFUNGIN ACETATE 260 MILLIGRAM(S): 7 INJECTION, POWDER, LYOPHILIZED, FOR SOLUTION INTRAVENOUS at 11:43

## 2024-02-29 RX ADMIN — Medication 250 MILLIGRAM(S): at 17:46

## 2024-02-29 RX ADMIN — Medication 2 SPRAY(S): at 14:25

## 2024-02-29 RX ADMIN — Medication 1 TABLET(S): at 17:47

## 2024-02-29 RX ADMIN — MIDODRINE HYDROCHLORIDE 20 MILLIGRAM(S): 2.5 TABLET ORAL at 05:04

## 2024-02-29 RX ADMIN — ENOXAPARIN SODIUM 50 MILLIGRAM(S): 100 INJECTION SUBCUTANEOUS at 17:46

## 2024-02-29 RX ADMIN — ENOXAPARIN SODIUM 50 MILLIGRAM(S): 100 INJECTION SUBCUTANEOUS at 05:04

## 2024-02-29 RX ADMIN — CHLORHEXIDINE GLUCONATE 1 APPLICATION(S): 213 SOLUTION TOPICAL at 12:00

## 2024-02-29 RX ADMIN — MIDODRINE HYDROCHLORIDE 20 MILLIGRAM(S): 2.5 TABLET ORAL at 11:43

## 2024-02-29 RX ADMIN — LEVETIRACETAM 400 MILLIGRAM(S): 250 TABLET, FILM COATED ORAL at 17:47

## 2024-02-29 RX ADMIN — GABAPENTIN 300 MILLIGRAM(S): 400 CAPSULE ORAL at 05:03

## 2024-02-29 RX ADMIN — MIDODRINE HYDROCHLORIDE 20 MILLIGRAM(S): 2.5 TABLET ORAL at 16:00

## 2024-02-29 RX ADMIN — GABAPENTIN 300 MILLIGRAM(S): 400 CAPSULE ORAL at 17:46

## 2024-02-29 RX ADMIN — Medication 4 MILLILITER(S): at 23:02

## 2024-02-29 RX ADMIN — MEROPENEM 100 MILLIGRAM(S): 1 INJECTION INTRAVENOUS at 05:05

## 2024-02-29 RX ADMIN — Medication 3 MILLIGRAM(S): at 22:32

## 2024-02-29 RX ADMIN — Medication 3 MILLILITER(S): at 18:03

## 2024-02-29 RX ADMIN — RALOXIFENE HYDROCHLORIDE 60 MILLIGRAM(S): 60 TABLET, COATED ORAL at 11:43

## 2024-02-29 RX ADMIN — LEVETIRACETAM 400 MILLIGRAM(S): 250 TABLET, FILM COATED ORAL at 05:03

## 2024-02-29 RX ADMIN — Medication 3 MILLILITER(S): at 14:03

## 2024-02-29 RX ADMIN — Medication 1 DROP(S): at 17:53

## 2024-02-29 RX ADMIN — Medication 1 APPLICATION(S): at 17:47

## 2024-02-29 RX ADMIN — Medication 1 DROP(S): at 05:06

## 2024-02-29 RX ADMIN — Medication 2 SPRAY(S): at 05:10

## 2024-02-29 RX ADMIN — Medication 1 APPLICATION(S): at 05:06

## 2024-02-29 NOTE — PROGRESS NOTE ADULT - SUBJECTIVE AND OBJECTIVE BOX
58F w/ PMHx Down Syndrome, nonverbal at baseline, Hypothyroidism, Cerebral palsy and Seizure Disorders presents to the ED from nursing facility/group home ([facility]) presented to ED for respiratory distress and high grade fever. Patient found to be septic on admission.Admitted to SDU for management of acute respiratory distress 2/2 CAP/Aspiration PNA (20 Jan 2024 18:22)  While pt was on the floor she developed dyspnea after swallow evaluation, was spiking high grade fever, consulted by pulmonary/critical care and was upgraded back to SDU.  Pt developed left lung white out, improved after aggressive chest PT, treated with Meropenem and caspofungin, ID is following, her overall prognosis is very poor, she might need trach and PEG in the nearest future , a meeting with facility staff and state took place on 2/14.   Pt completed course o Meropenem, Levofloxacin added on 2/15 ( pt spiked fever), Meropenem resumed on 2/18,  she was tapered off pressure support, requires  high flow oxygen fo a while in SDU  Hb dropped without acute blood loss, will  monitor closely ( pt is o full AC). Now pt is off pressure support.   Today pt is awake, looks comfortable, nonverbal.     PAST MEDICAL & SURGICAL HISTORY:  Down syndrome  Osteoporosis  Mild anemia  Neuropathy  S/P debridement  of R hip on 3/2/21      Vital Signs:  T(C): 36.6 (29 Feb 2024 16:07), Max: 37.1 (29 Feb 2024 00:00)  T(F): 97.8 (29 Feb 2024 16:07), Max: 98.8 (29 Feb 2024 00:00)  HR: 79 (29 Feb 2024 16:07) (68 - 139)  BP: 85/51 (29 Feb 2024 16:07) (85/51 - 105/74)  BP(mean): 67 (29 Feb 2024 12:01) (67 - 84)  RR: 20 (29 Feb 2024 16:07) (20 - 20)  SpO2: 100% (29 Feb 2024 16:07) (92% - 100%)    Parameters below as of 29 Feb 2024 16:07  Patient On (Oxygen Delivery Method): nasal cannula, high flow      PHYSICAL EXAM:  GENERAL:  NAD chronically ill appearing, mentally challenged   SKIN: No rashes or lesions  HEENT: Atraumatic. Normocephalic   NECK: Supple, No JVD.    PULMONARY: decreased breath sounds B/L, poor air entry   CVS: Normal S1, S2. Rate and Rhythm are regular.    ABDOMEN/GI: Soft, Nontender, Nondistended.  MSK:  contracted LE   NEUROLOGIC: does not follow commands, opens eyes spontaneously   PSYCH: Alert & oriented x 0    Consultant(s) Notes Reviewed:  [x ] YES  [ ] NO  Care Discussed with Consultants/Other Providers [ x] YES  [ ] NO    LABS:                                   7.9    14.48 )-----------( 635      ( 29 Feb 2024 06:49 )             26.5   02-29    141  |  101  |  12  ----------------------------<  150<H>  4.8   |  32  |  0.5<L>    Ca    8.6      29 Feb 2024 06:49  Mg     2.3     02-29    TPro  6.4  /  Alb  2.7<L>  /  TBili  <0.2  /  DBili  x   /  AST  14  /  ALT  12  /  AlkPhos  106  02-29        Culture - Blood (collected 25 Feb 2024 11:47)  Source: .Blood None  Preliminary Report (26 Feb 2024 20:02):    No growth at 24 hours        RADIOLOGY & ADDITIONAL TESTS:  Imaging or report Personally Reviewed:  [x] YES  [ ] NO  EKG reviewed: [x] YES  [ ] NO    < from: Xray Chest 1 View- PORTABLE-Routine (02.28.24 @ 05:32) >    Impression:    Bilateral opacifications, left greater than right, stable. Support   devices as described.    < end of copied text >  < from: CT Chest w/ IV Cont (02.21.24 @ 00:09) >  Impression:    Bilateral pneumonia, left worse than right, with a left upper lobe   thick-walled cavity.    Enteric catheter as described. Note that by the time that this report was   dictated, this issue had been tended to..    < end of copied text >  < from: CT Abdomen and Pelvis w/ IV Cont (02.21.24 @ 00:10) >    IMPRESSION:  1.  No CT evidence of an acuteabdominopelvic pathology.    See separately dictated CT chest report for intrathoracic findings.    < end of copied text >        MEDICATIONS  (STANDING):  acetylcysteine 20%  Inhalation 4 milliLiter(s) Inhalation every 6 hours  albuterol/ipratropium for Nebulization 3 milliLiter(s) Nebulizer every 6 hours  AQUAPHOR (petrolatum Ointment) 1 Application(s) Topical two times a day  artificial  tears Solution 1 Drop(s) Both EYES two times a day  calcium carbonate   1250 mG (OsCal) 1 Tablet(s) Oral two times a day  caspofungin IVPB      caspofungin IVPB 50 milliGRAM(s) IV Intermittent every 24 hours  chlorhexidine 2% Cloths 1 Application(s) Topical daily  chlorhexidine 2% Cloths 1 Application(s) Topical daily  enoxaparin Injectable 50 milliGRAM(s) SubCutaneous every 12 hours  gabapentin 300 milliGRAM(s) Oral every 12 hours  levETIRAcetam  IVPB 500 milliGRAM(s) IV Intermittent two times a day  melatonin 3 milliGRAM(s) Oral at bedtime  meropenem  IVPB 1000 milliGRAM(s) IV Intermittent every 8 hours  meropenem  IVPB      midodrine. 20 milliGRAM(s) Oral three times a day  pantoprazole  Injectable 40 milliGRAM(s) IV Push every 24 hours  polyethylene glycol 3350 17 Gram(s) Oral at bedtime  raloxifene 60 milliGRAM(s) Oral daily  saccharomyces boulardii 250 milliGRAM(s) Oral two times a day  senna 2 Tablet(s) Oral at bedtime  sodium chloride 0.65% Nasal 2 Spray(s) Both Nostrils three times a day  sodium chloride 0.9% Bolus 500 milliLiter(s) IV Bolus once    MEDICATIONS  (PRN):  aluminum hydroxide/magnesium hydroxide/simethicone Suspension 30 milliLiter(s) Oral every 4 hours PRN Dyspepsia  ondansetron Injectable 4 milliGRAM(s) IV Push every 8 hours PRN Nausea and/or Vomiting

## 2024-02-29 NOTE — PROGRESS NOTE ADULT - ASSESSMENT
IMPRESSION:    Acute hypoxemic respiratory failure on HHFNC  PNA  Sepsis POA  Anemia  Septic shock off Levophed   Recurrent aspiration pneumonia / prior intubation  HO DVT on Eliquis   HO GI bleed  HO OM  HO recent duodenal perforation   HO polymicrobial bacteremia   H/o CP, DS  H/o seizures    PLAN:    CNS:  Avoid CNS Depressant, AED. MS at baseline.    HEENT: Oral care.     PULMONARY: HOB at 45 degrees.  Aspiration precaution. Wean FiO2 Keep spo2 92 TO 96%.     CARDIOVASCULAR: Keep MAP more than 60. Avoid overload. C/w midodrine     GI: Protonix. NG feeding.  will need PEG when more stable    INFECTIOUS DISEASE:  ABX and Anti fungal per ID.     HEMATOLOGICAL:  Lovenox therapeutic.  Monitor CBC    ENDOCRINE:  Follow up FS.  Insulin protocol if needed.    MUSCULOSKELETAL: Bedrest.  Off loading.  Wound care.    Prognosis very poor.    Palliative care following    SDU

## 2024-02-29 NOTE — PROGRESS NOTE ADULT - CONVERSATION DETAILS
[facility] decided on DNR/DNI/CMO per their documentation letter. On discussion with primary attending, agreed that DNR/DNI is reasonable given her current clinical course, but decision on CMO to be determined after completion of antibiotic and antifungal course as advised by infectious disease. Discussed with MHLS as well, and MOLST checklist completed for DNR/DNI, along with MOLST.

## 2024-02-29 NOTE — PROGRESS NOTE ADULT - ASSESSMENT
58F w/ PMHx Down Syndrome, nonverbal at baseline, Hypothyroidism, Cerebral palsy and Seizure Disorders presents to the ED [facility] presented to ED for respiratory distress and high grade fever. Patient found to be septic on admission. Admitted to SDU for management of acute respiratory distress 2/2 CAP/Aspiration PNA (20 Jan 2024 18:22)  While pt was on the floor she developed dyspnea after swallow evaluation, was spiking high grade fever, consulted by pulmonary/critical care and was upgraded back to SDU.    # Sepsis POA / Acute hypoxic resp failure / RSV bronchiolitis /  H/o Dysphagia/ suspected aspiration PNA /high grade fever   - off levophed   - continue midodrine   - continue caspo, danna   - ID following   - Failed FEES, put NG tube for feedings and medications, pt'll likely need PEG/trach when stable, consulted by GI   - supplement oxygen, monitor pulse Ox, on high flow oxygen for weeks   - c/w duoneb  - pulmonary is following, recommendations noted  - aggressive chest PT with vest, aspiration precautions and suction   - continue caspo, danna until 3/10 per ID       # Elevated Troponin , type 2 MI/  Sinus tachycardia  - c/w telemetry monitoring   - Repeat EKG: Normal sinus rhythm  - c/w  metoprolol  -  maintain fluid balance   - TTE 2/19 normal EF no DD or valve abnormalities    # Seizure Disorder  - c/w  Keppra   - seizure precautions  - keep Mg above 2.0     # H/o lower ext DVT  - eliquis to lovenox     # Hypothyroidism  - Thyroid Stimulating Hormone, Serum: 0.51 uIU/mL (01.21.24 @ 06:34)    # Normocytic Anemia, anemia of chronic disease   - monitor H/H, keep Hb above 7.5     # Down Syndrome/  Cerebral Palsy  - supportive care  - prevent falls and aspiration   - failed speech and swallow, needs PEG   - on Raloxifene     #Supportive Management:  Dispo: sdu  DVT Ppx: enoxaparin Injectable 50 milliGRAM(s) SubCutaneous every 12 hours  GI Ppx: pantoprazole  Injectable 40 milliGRAM(s) IV Push every 24 hours  Diet:  Diet, NPO with Tube Feed:   Tube Feeding Modality: Nasogastric  Jevity 1.2 Juventino      # Full code  - conversation started with patient's representative and progress note faxed to start considering DNR/DNI. for now full code.   La Nena Vasquez [phone]  2/29/24 called again this AM, no answer, left voicemail w/contact number for call back     Full code, palliative following for ongoing GOC, pt is [facility], paperwork to discuss code status faxed to [facility]     Pending: taper down supplemental 02, recall GI once o2 requirements decrease for PEG, fu palliative

## 2024-02-29 NOTE — PROGRESS NOTE ADULT - SUBJECTIVE AND OBJECTIVE BOX
24H events:    Patient is a 58y old Female who presents with a chief complaint of Respiratory Distress (29 Feb 2024 07:13)    Primary diagnosis of Sepsis with acute hypoxic respiratory failure    Today is hospital day 40d. This morning patient was seen and examined at bedside, resting comfortably in bed.    No acute or major events overnight. Hemodynamically stable, tolerating oral diet, voiding appropriately with appropriate bowel movements.     Code Status:    Family communication:  Contact date:  Name of person contacted:  Relationship to patient:  Communication details:  What matters most:    PAST MEDICAL & SURGICAL HISTORY  Down syndrome    Osteoporosis    Mild anemia    Neuropathy    S/P debridement  of R hip on 3/2/21      SOCIAL HISTORY:  Social History:      ALLERGIES:  No Known Allergies    MEDICATIONS:  STANDING MEDICATIONS  acetylcysteine 20%  Inhalation 4 milliLiter(s) Inhalation every 6 hours  albuterol/ipratropium for Nebulization 3 milliLiter(s) Nebulizer every 6 hours  AQUAPHOR (petrolatum Ointment) 1 Application(s) Topical two times a day  artificial  tears Solution 1 Drop(s) Both EYES two times a day  calcium carbonate   1250 mG (OsCal) 1 Tablet(s) Oral two times a day  caspofungin IVPB 50 milliGRAM(s) IV Intermittent every 24 hours  caspofungin IVPB      chlorhexidine 2% Cloths 1 Application(s) Topical daily  chlorhexidine 2% Cloths 1 Application(s) Topical daily  enoxaparin Injectable 50 milliGRAM(s) SubCutaneous every 12 hours  gabapentin 300 milliGRAM(s) Oral every 12 hours  levETIRAcetam  IVPB 500 milliGRAM(s) IV Intermittent two times a day  melatonin 3 milliGRAM(s) Oral at bedtime  meropenem  IVPB 1000 milliGRAM(s) IV Intermittent every 8 hours  meropenem  IVPB      midodrine. 20 milliGRAM(s) Oral three times a day  pantoprazole  Injectable 40 milliGRAM(s) IV Push every 24 hours  polyethylene glycol 3350 17 Gram(s) Oral at bedtime  raloxifene 60 milliGRAM(s) Oral daily  saccharomyces boulardii 250 milliGRAM(s) Oral two times a day  senna 2 Tablet(s) Oral at bedtime  sodium chloride 0.65% Nasal 2 Spray(s) Both Nostrils three times a day  sodium chloride 0.9% Bolus 500 milliLiter(s) IV Bolus once    PRN MEDICATIONS  aluminum hydroxide/magnesium hydroxide/simethicone Suspension 30 milliLiter(s) Oral every 4 hours PRN  ondansetron Injectable 4 milliGRAM(s) IV Push every 8 hours PRN    VITALS:   T(F): 98.4  HR: 139  BP: 100/64  RR: 20  SpO2: 92%    PHYSICAL EXAM:  Gen: NAD, resting in bed  HEENT: Normocephalic, atraumatic  Neck: supple, no lymphadenopathy  CV: Regular rate & regular rhythm  Lungs: decrease bs b/l, no wheeze, on HFNC  Abdomen: Soft, NTND+ BS present, NGT  Ext: Warm, well perfused no CCE  Neuro: non focal, awake, does not follow commands  Skin: no rash, no erythema on extremeties    (  ) Indwelling Madsen Catheter:   Date insterted:    Reason (  ) Critical illness     (  ) urinary retention    (  ) Accurate Ins/Outs Monitoring     (  ) CMO patient    (  ) Central Line:   Date inserted:  Location: (  ) Right IJ     (  ) Left IJ     (  ) Right Fem     (  ) Left Fem    (  ) SPC        (  ) pigtail       (  ) PEG tube       (  ) colostomy       (  ) jejunostomy  (  ) U-Dall    LABS:                        7.9    14.48 )-----------( 635      ( 29 Feb 2024 06:49 )             26.5     02-29    141  |  101  |  12  ----------------------------<  150<H>  4.8   |  32  |  0.5<L>    Ca    8.6      29 Feb 2024 06:49  Mg     2.3     02-29    TPro  6.4  /  Alb  2.7<L>  /  TBili  <0.2  /  DBili  x   /  AST  14  /  ALT  12  /  AlkPhos  106  02-29      Urinalysis Basic - ( 29 Feb 2024 06:49 )    Color: x / Appearance: x / SG: x / pH: x  Gluc: 150 mg/dL / Ketone: x  / Bili: x / Urobili: x   Blood: x / Protein: x / Nitrite: x   Leuk Esterase: x / RBC: x / WBC x   Sq Epi: x / Non Sq Epi: x / Bacteria: x                RADIOLOGY:    RADIOLOGY

## 2024-02-29 NOTE — PROGRESS NOTE ADULT - ASSESSMENT
58F with Down syndrome, nonverbal at baseline, hypothyroidism, CP, and seizure disorder here from [facility] with fever and respiratory distress. Found to be septic on admission, from CAP/aspiration PNA, on IV vanco and meropenem, with course c/b continued fevers, and bacteremia with S. hominis. Also failed FEES, has NGT in place and will likely need PEG. Is requiring HFNC alternating with BiPAP. Is also on midodrine for hypotension. Patient is full code. Of note patient is under [facility]. Palliative care consulted for [facility].

## 2024-02-29 NOTE — PROGRESS NOTE ADULT - SUBJECTIVE AND OBJECTIVE BOX
HPI: 58F with Down syndrome, nonverbal at baseline, hypothyroidism, CP, and seizure disorder here from [facility] with fever and respiratory distress. Found to be septic on admission, from CAP/aspiration PNA, on IV vanco and meropenem, with course c/b continued fevers, and bacteremia with S. hominis. Also failed FEES, has NGT in place and will likely need PEG. Is requiring HFNC alternating with BiPAP. Is also on midodrine for hypotension. Patient is full code. [facility]. Palliative care consulted for [facility].    INTERVAL EVENTS  2/29: patient appears comfortable overall, group home staff at bedside    ADVANCE DIRECTIVES:    [X ] Full Code [ ] DNR  MOLST  [ ]  Living Will  [ ]   DECISION MAKER(s):  [ ] Health Care Proxy(s)  [ ] Surrogate(s)  [ ] Guardian           Name(s): Phone Number(s): [place] [facility]    BASELINE (I)ADL(s) (prior to admission):  [place]: [ ]Total  [ ] Moderate [ ]Dependent  Palliative Performance Status Version 2:         %    http://npcrc.org/files/news/palliative_performance_scale_ppsv2.pdf    Allergies    No Known Allergies    Intolerances    MEDICATIONS  (STANDING):  acetylcysteine 20%  Inhalation 4 milliLiter(s) Inhalation every 6 hours  albuterol/ipratropium for Nebulization 3 milliLiter(s) Nebulizer every 6 hours  AQUAPHOR (petrolatum Ointment) 1 Application(s) Topical two times a day  artificial  tears Solution 1 Drop(s) Both EYES two times a day  calcium carbonate   1250 mG (OsCal) 1 Tablet(s) Oral two times a day  caspofungin IVPB      caspofungin IVPB 50 milliGRAM(s) IV Intermittent every 24 hours  chlorhexidine 2% Cloths 1 Application(s) Topical daily  chlorhexidine 2% Cloths 1 Application(s) Topical daily  enoxaparin Injectable 50 milliGRAM(s) SubCutaneous every 12 hours  gabapentin 300 milliGRAM(s) Oral every 12 hours  levETIRAcetam  IVPB 500 milliGRAM(s) IV Intermittent two times a day  melatonin 3 milliGRAM(s) Oral at bedtime  meropenem  IVPB 1000 milliGRAM(s) IV Intermittent every 8 hours  meropenem  IVPB      midodrine. 20 milliGRAM(s) Oral three times a day  pantoprazole  Injectable 40 milliGRAM(s) IV Push every 24 hours  polyethylene glycol 3350 17 Gram(s) Oral at bedtime  raloxifene 60 milliGRAM(s) Oral daily  saccharomyces boulardii 250 milliGRAM(s) Oral two times a day  senna 2 Tablet(s) Oral at bedtime  sodium chloride 0.65% Nasal 2 Spray(s) Both Nostrils three times a day  sodium chloride 0.9% Bolus 500 milliLiter(s) IV Bolus once    MEDICATIONS  (PRN):  aluminum hydroxide/magnesium hydroxide/simethicone Suspension 30 milliLiter(s) Oral every 4 hours PRN Dyspepsia  ondansetron Injectable 4 milliGRAM(s) IV Push every 8 hours PRN Nausea and/or Vomiting      PRESENT SYMPTOMS: [X ]Unable to obtain due to poor mentation   Source if other than patient:  [ ]Family   [ ]Team     Pain: [ ]yes [ ]no  QOL impact -   Location -                    Aggravating factors -  Quality -  Radiation -  Timing-  Severity (0-10 scale):  Minimal acceptable level (0-10 scale):     CPOT:    https://www.sccm.org/getattachment/avp83j50-8y7m-8d5r-1w4z-2278z3593z7l/Critical-Care-Pain-Observation-Tool-(CPOT)    PAIN AD Score: 0  http://geriatrictoolkit.[email or web address]/cog/painad.pdf (press ctrl +  left click to view)    Dyspnea:                           [ ]None[ ]Mild [ ]Moderate [ ]Severe     Respiratory Distress Observation Scale (RDOS): 0  A score of 0 to 2 signifies little or no respiratory distress, 3 signifies mild distress, scores 4 to 6 indicate moderate distress, and scores greater than 7 signify severe distress  [email or web address]://[email or web address]/sites/default/files/PDFS/466994-eprnnqexxhy-fstqbxdt-tedvkcswduw-ibuor.pdf    Anxiety:                             [ ]None[ ]Mild [ ]Moderate [ ]Severe   Fatigue:                             [ ]None[ ]Mild [ ]Moderate [ ]Severe   Nausea:                             [ ]None[ ]Mild [ ]Moderate [ ]Severe   Loss of appetite:              [ ]None[ ]Mild [ ]Moderate [ ]Severe   Constipation:                    [ ]None[ ]Mild [ ]Moderate [ ]Severe    Other Symptoms:  [ ]All other review of systems negative     Palliative Performance Status Version 2:         %    http://npcrc.org/files/news/palliative_performance_scale_ppsv2.pdf  PHYSICAL EXAM:  Vital Signs Last 24 Hrs  T(C): 36.8 (29 Feb 2024 12:01), Max: 37.1 (29 Feb 2024 00:00)  T(F): 98.2 (29 Feb 2024 12:01), Max: 98.8 (29 Feb 2024 00:00)  HR: 68 (29 Feb 2024 12:01) (68 - 139)  BP: 86/59 (29 Feb 2024 12:01) (86/59 - 105/74)  BP(mean): 67 (29 Feb 2024 12:01) (67 - 84)  RR: 20 (29 Feb 2024 12:01) (20 - 20)  SpO2: 99% (29 Feb 2024 12:01) (92% - 99%)    Parameters below as of 29 Feb 2024 12:01  Patient On (Oxygen Delivery Method): nasal cannula, high flow    GENERAL:  [X ] No acute distress [ ]Lethargic  [ ]Unarousable  [ ]Verbal  [ ]Non-Verbal [ ]Cachexia    BEHAVIORAL/PSYCH:  [ ]Alert and Oriented x  [ ] Anxiety [ ] Delirium [ ] Agitation [X ] Calm   EYES: [ X] No scleral icterus [ ] Scleral icterus [ ] Closed  ENMT:  [ ]Dry mouth  [ ]No external oral lesions [ X] No external ear or nose lesions  CARDIOVASCULAR:  [ ]Regular [ ]Irregular [ ]Tachy [ ]Not Tachy  [ ]Raheem [ ] Edema [ ] No edema  PULMONARY:  [ ]Tachypnea  [ ]Audible excessive secretions [X ] No labored breathing [ ] labored breathing  GASTROINTESTINAL: [ ]Soft  [ ]Distended  [ X]Not distended [ ]Non tender [ ]Tender  MUSCULOSKELETAL: [ ]No clubbing [ ] clubbing  [ X] No cyanosis [ ] cyanosis  NEUROLOGIC: [ ]No focal deficits  [ ]Follows commands  [ ]Does not follow commands  [X ]Cognitive impairment  [ ]Dysphagia  [ ]Dysarthria  [ ]Paresis   SKIN: [ ] Jaundiced [X ] Non-jaundiced [ ]Rash [ ]No Rash [ ] Warm [ ] Dry  MISC/LINES: [ ] ET tube [ ] Trach [ ]NGT/OGT [ ]PEG [ ]Mdasen    CRITICAL CARE:  [ ] Shock Present  [ ]Septic [ ]Cardiogenic [ ]Neurologic [ ]Hypovolemic  [ ]  Vasopressors [ ]  Inotropes   [ ]Respiratory failure present [ ]Mechanical ventilation [ ]Non-invasive ventilatory support [ ]High flow  [ ]Acute  [ ]Chronic [ ]Hypoxic  [ ]Hypercarbic [ ]Other  [ ]Other organ failure     LABS: reviewed by me                                   7.9    14.48 )-----------( 635      ( 29 Feb 2024 06:49 )             26.5     02-29    141  |  101  |  12  ----------------------------<  150<H>  4.8   |  32  |  0.5<L>    Ca    8.6      29 Feb 2024 06:49  Mg     2.3     02-29        RADIOLOGY & ADDITIONAL STUDIES: reviewed by m    CXR 2/5/24    Support devices: Enteric tube satisfactory position.    Cardiac/ Mediastinum: unremarkable    Lungs/ Pleura: Diminishing left lung opacity/effusion. Stable smaller   right basilar opacity. No pneumothorax.    Skeletal/ soft tissues: Stable    PROTEIN CALORIE MALNUTRITION PRESENT: [ ]mild [ ]moderate [ ]severe [ ]underweight [ ]morbid obesity  https://www.andeal.org/vault/2440/web/files/ONC/Table_Clinical%20Characteristics%20to%20Document%20Malnutrition-White%20JV%20et%20al%202012.pdf    Height (cm): 136.4 (01-24-24 @ 09:52), 154.9 (11-17-23 @ 15:00), 145 (10-02-23 @ 12:00)  Weight (kg): 52.3 (01-21-24 @ 08:00), 60 (11-17-23 @ 15:00), 55.3 (10-02-23 @ 12:00)  BMI (kg/m2): 28.1 (01-24-24 @ 09:52), 21.8 (01-21-24 @ 08:00), 25 (11-17-23 @ 15:00)    [ ]PPSV2 < or = to 30% [ ]significant weight loss  [ ]poor nutritional intake  [ ]anasarca      [ ]Artificial Nutrition          Palliative Care Spiritual/Emotional Screening Tool Question  Severity (0-4):                    OR                    [X ] Unable to determine/NA  Score of 2 or greater indicates recommendation of Chaplaincy referral  Chaplaincy Referral: [ ] Yes [ ] Refused [ ] Following     Caregiver [place]:  [ ] Yes [ ] No    OR    [x ] Unable to determine. Will assess at later time if appropriate.  Social Work Referral [ ]  Patient and Family Centered Care Referral [ ]    Anticipatory Grief Present: [ ] Yes [ ] No    OR     [ x] Unable to determine. Will assess at later time if appropriate.  Social Work Referral [ ]  Patient and Family Centered Care Referral [ ]    REFERRALS:   [ ]Chaplaincy  [ ]Hospice  [ ]Child Life  [ ]Social Work  [ ]Case management [ ]Holistic Therapy     Palliative care education provided to patient and/or family    Goals of Care Document:     ______________  uW Jenkins MD  Palliative Medicine  [facility]   of Geriatric and Palliative Medicine  [phone]

## 2024-02-29 NOTE — PROGRESS NOTE ADULT - SUBJECTIVE AND OBJECTIVE BOX
Over Night Events: events noted, on HHFNC afebrile, podiatry noted    PHYSICAL EXAM    ICU Vital Signs Last 24 Hrs  T(C): 37.1 (29 Feb 2024 04:00), Max: 37.1 (28 Feb 2024 16:00)  T(F): 98.7 (29 Feb 2024 04:00), Max: 98.8 (28 Feb 2024 16:00)  HR: 93 (29 Feb 2024 04:00) (61 - 93)  BP: 105/55 (29 Feb 2024 04:00) (84/43 - 105/74)  BP(mean): 84 (29 Feb 2024 00:00) (59 - 84)  RR: 20 (29 Feb 2024 04:00) (20 - 20)  SpO2: 94% (29 Feb 2024 04:00) (94% - 100%)    O2 Parameters below as of 29 Feb 2024 04:00  Patient On (Oxygen Delivery Method): nasal cannula, high flow            General: ill looking  Lungs: dec bs both bases  Cardiovascular: Regular   Abdomen: Soft, Positive BS  Extremities: No clubbing   not following commands      02-28-24 @ 07:01  -  02-29-24 @ 07:00  --------------------------------------------------------  IN:    Enteral Tube Flush: 150 mL    Jevity 1.2: 900 mL  Total IN: 1050 mL    OUT:    Voided (mL): 1200 mL  Total OUT: 1200 mL    Total NET: -150 mL          LABS:                          7.7    10.13 )-----------( 607      ( 28 Feb 2024 06:25 )             25.5                                               02-28    138  |  98  |  11  ----------------------------<  89  4.7   |  32  |  0.5<L>    Ca    8.5      28 Feb 2024 06:25  Mg     2.3     02-28    TPro  5.9<L>  /  Alb  2.4<L>  /  TBili  <0.2  /  DBili  x   /  AST  17  /  ALT  12  /  AlkPhos  96  02-28                                             Urinalysis Basic - ( 28 Feb 2024 06:25 )    Color: x / Appearance: x / SG: x / pH: x  Gluc: 89 mg/dL / Ketone: x  / Bili: x / Urobili: x   Blood: x / Protein: x / Nitrite: x   Leuk Esterase: x / RBC: x / WBC x   Sq Epi: x / Non Sq Epi: x / Bacteria: x                                                  LIVER FUNCTIONS - ( 28 Feb 2024 06:25 )  Alb: 2.4 g/dL / Pro: 5.9 g/dL / ALK PHOS: 96 U/L / ALT: 12 U/L / AST: 17 U/L / GGT: x                                                                                                                                       MEDICATIONS  (STANDING):  acetylcysteine 20%  Inhalation 4 milliLiter(s) Inhalation every 6 hours  albuterol/ipratropium for Nebulization 3 milliLiter(s) Nebulizer every 6 hours  AQUAPHOR (petrolatum Ointment) 1 Application(s) Topical two times a day  artificial  tears Solution 1 Drop(s) Both EYES two times a day  calcium carbonate   1250 mG (OsCal) 1 Tablet(s) Oral two times a day  caspofungin IVPB 50 milliGRAM(s) IV Intermittent every 24 hours  caspofungin IVPB      chlorhexidine 2% Cloths 1 Application(s) Topical daily  chlorhexidine 2% Cloths 1 Application(s) Topical daily  enoxaparin Injectable 50 milliGRAM(s) SubCutaneous every 12 hours  gabapentin 300 milliGRAM(s) Oral every 12 hours  levETIRAcetam  IVPB 500 milliGRAM(s) IV Intermittent two times a day  melatonin 3 milliGRAM(s) Oral at bedtime  meropenem  IVPB      meropenem  IVPB 1000 milliGRAM(s) IV Intermittent every 8 hours  midodrine. 20 milliGRAM(s) Oral three times a day  pantoprazole  Injectable 40 milliGRAM(s) IV Push every 24 hours  polyethylene glycol 3350 17 Gram(s) Oral at bedtime  raloxifene 60 milliGRAM(s) Oral daily  saccharomyces boulardii 250 milliGRAM(s) Oral two times a day  senna 2 Tablet(s) Oral at bedtime  sodium chloride 0.65% Nasal 2 Spray(s) Both Nostrils three times a day  sodium chloride 0.9% Bolus 500 milliLiter(s) IV Bolus once    MEDICATIONS  (PRN):  aluminum hydroxide/magnesium hydroxide/simethicone Suspension 30 milliLiter(s) Oral every 4 hours PRN Dyspepsia  ondansetron Injectable 4 milliGRAM(s) IV Push every 8 hours PRN Nausea and/or Vomiting    cxr noted

## 2024-03-01 LAB
ALBUMIN SERPL ELPH-MCNC: 2.7 G/DL — LOW (ref 3.5–5.2)
ALP SERPL-CCNC: 105 U/L — SIGNIFICANT CHANGE UP (ref 30–115)
ALT FLD-CCNC: 11 U/L — SIGNIFICANT CHANGE UP (ref 0–41)
ANION GAP SERPL CALC-SCNC: 13 MMOL/L — SIGNIFICANT CHANGE UP (ref 7–14)
AST SERPL-CCNC: 18 U/L — SIGNIFICANT CHANGE UP (ref 0–41)
BASOPHILS # BLD AUTO: 0.1 K/UL — SIGNIFICANT CHANGE UP (ref 0–0.2)
BASOPHILS NFR BLD AUTO: 0.9 % — SIGNIFICANT CHANGE UP (ref 0–1)
BILIRUB SERPL-MCNC: <0.2 MG/DL — SIGNIFICANT CHANGE UP (ref 0.2–1.2)
BUN SERPL-MCNC: 10 MG/DL — SIGNIFICANT CHANGE UP (ref 10–20)
CALCIUM SERPL-MCNC: 8.9 MG/DL — SIGNIFICANT CHANGE UP (ref 8.4–10.5)
CHLORIDE SERPL-SCNC: 95 MMOL/L — LOW (ref 98–110)
CO2 SERPL-SCNC: 27 MMOL/L — SIGNIFICANT CHANGE UP (ref 17–32)
CREAT SERPL-MCNC: 0.5 MG/DL — LOW (ref 0.7–1.5)
CULTURE RESULTS: SIGNIFICANT CHANGE UP
EGFR: 109 ML/MIN/1.73M2 — SIGNIFICANT CHANGE UP
EOSINOPHIL # BLD AUTO: 0.23 K/UL — SIGNIFICANT CHANGE UP (ref 0–0.7)
EOSINOPHIL NFR BLD AUTO: 2.2 % — SIGNIFICANT CHANGE UP (ref 0–8)
GLUCOSE BLDC GLUCOMTR-MCNC: 105 MG/DL — HIGH (ref 70–99)
GLUCOSE BLDC GLUCOMTR-MCNC: 108 MG/DL — HIGH (ref 70–99)
GLUCOSE BLDC GLUCOMTR-MCNC: 123 MG/DL — HIGH (ref 70–99)
GLUCOSE BLDC GLUCOMTR-MCNC: 95 MG/DL — SIGNIFICANT CHANGE UP (ref 70–99)
GLUCOSE SERPL-MCNC: 159 MG/DL — HIGH (ref 70–99)
HCT VFR BLD CALC: 32.2 % — LOW (ref 37–47)
HGB BLD-MCNC: 9.5 G/DL — LOW (ref 12–16)
IMM GRANULOCYTES NFR BLD AUTO: 0.7 % — HIGH (ref 0.1–0.3)
LYMPHOCYTES # BLD AUTO: 2.38 K/UL — SIGNIFICANT CHANGE UP (ref 1.2–3.4)
LYMPHOCYTES # BLD AUTO: 22.3 % — SIGNIFICANT CHANGE UP (ref 20.5–51.1)
MAGNESIUM SERPL-MCNC: 2.5 MG/DL — HIGH (ref 1.8–2.4)
MCHC RBC-ENTMCNC: 23.3 PG — LOW (ref 27–31)
MCHC RBC-ENTMCNC: 29.5 G/DL — LOW (ref 32–37)
MCV RBC AUTO: 79.1 FL — LOW (ref 81–99)
MONOCYTES # BLD AUTO: 0.59 K/UL — SIGNIFICANT CHANGE UP (ref 0.1–0.6)
MONOCYTES NFR BLD AUTO: 5.5 % — SIGNIFICANT CHANGE UP (ref 1.7–9.3)
NEUTROPHILS # BLD AUTO: 7.31 K/UL — HIGH (ref 1.4–6.5)
NEUTROPHILS NFR BLD AUTO: 68.4 % — SIGNIFICANT CHANGE UP (ref 42.2–75.2)
NRBC # BLD: 0 /100 WBCS — SIGNIFICANT CHANGE UP (ref 0–0)
PLATELET # BLD AUTO: 544 K/UL — HIGH (ref 130–400)
PMV BLD: 10.1 FL — SIGNIFICANT CHANGE UP (ref 7.4–10.4)
POTASSIUM SERPL-MCNC: 4.9 MMOL/L — SIGNIFICANT CHANGE UP (ref 3.5–5)
POTASSIUM SERPL-SCNC: 4.9 MMOL/L — SIGNIFICANT CHANGE UP (ref 3.5–5)
PROT SERPL-MCNC: 6.8 G/DL — SIGNIFICANT CHANGE UP (ref 6–8)
RBC # BLD: 4.07 M/UL — LOW (ref 4.2–5.4)
RBC # FLD: 21.6 % — HIGH (ref 11.5–14.5)
SODIUM SERPL-SCNC: 135 MMOL/L — SIGNIFICANT CHANGE UP (ref 135–146)
SPECIMEN SOURCE: SIGNIFICANT CHANGE UP
WBC # BLD: 10.68 K/UL — SIGNIFICANT CHANGE UP (ref 4.8–10.8)
WBC # FLD AUTO: 10.68 K/UL — SIGNIFICANT CHANGE UP (ref 4.8–10.8)

## 2024-03-01 PROCEDURE — 99233 SBSQ HOSP IP/OBS HIGH 50: CPT

## 2024-03-01 PROCEDURE — 71045 X-RAY EXAM CHEST 1 VIEW: CPT | Mod: 26

## 2024-03-01 RX ADMIN — Medication 250 MILLIGRAM(S): at 17:06

## 2024-03-01 RX ADMIN — MIDODRINE HYDROCHLORIDE 20 MILLIGRAM(S): 2.5 TABLET ORAL at 17:06

## 2024-03-01 RX ADMIN — MIDODRINE HYDROCHLORIDE 20 MILLIGRAM(S): 2.5 TABLET ORAL at 05:08

## 2024-03-01 RX ADMIN — Medication 1 APPLICATION(S): at 17:06

## 2024-03-01 RX ADMIN — MIDODRINE HYDROCHLORIDE 20 MILLIGRAM(S): 2.5 TABLET ORAL at 12:28

## 2024-03-01 RX ADMIN — Medication 1 TABLET(S): at 17:05

## 2024-03-01 RX ADMIN — MEROPENEM 100 MILLIGRAM(S): 1 INJECTION INTRAVENOUS at 05:07

## 2024-03-01 RX ADMIN — Medication 2 SPRAY(S): at 21:30

## 2024-03-01 RX ADMIN — CASPOFUNGIN ACETATE 260 MILLIGRAM(S): 7 INJECTION, POWDER, LYOPHILIZED, FOR SOLUTION INTRAVENOUS at 12:27

## 2024-03-01 RX ADMIN — Medication 1 DROP(S): at 17:12

## 2024-03-01 RX ADMIN — ENOXAPARIN SODIUM 50 MILLIGRAM(S): 100 INJECTION SUBCUTANEOUS at 05:07

## 2024-03-01 RX ADMIN — MEROPENEM 100 MILLIGRAM(S): 1 INJECTION INTRAVENOUS at 21:29

## 2024-03-01 RX ADMIN — Medication 3 MILLIGRAM(S): at 21:30

## 2024-03-01 RX ADMIN — Medication 4 MILLILITER(S): at 20:03

## 2024-03-01 RX ADMIN — Medication 3 MILLILITER(S): at 10:16

## 2024-03-01 RX ADMIN — Medication 1 TABLET(S): at 05:09

## 2024-03-01 RX ADMIN — CHLORHEXIDINE GLUCONATE 1 APPLICATION(S): 213 SOLUTION TOPICAL at 12:29

## 2024-03-01 RX ADMIN — ENOXAPARIN SODIUM 50 MILLIGRAM(S): 100 INJECTION SUBCUTANEOUS at 17:05

## 2024-03-01 RX ADMIN — GABAPENTIN 300 MILLIGRAM(S): 400 CAPSULE ORAL at 17:07

## 2024-03-01 RX ADMIN — MEROPENEM 100 MILLIGRAM(S): 1 INJECTION INTRAVENOUS at 14:36

## 2024-03-01 RX ADMIN — PANTOPRAZOLE SODIUM 40 MILLIGRAM(S): 20 TABLET, DELAYED RELEASE ORAL at 05:08

## 2024-03-01 RX ADMIN — LEVETIRACETAM 400 MILLIGRAM(S): 250 TABLET, FILM COATED ORAL at 17:12

## 2024-03-01 RX ADMIN — Medication 1 DROP(S): at 05:08

## 2024-03-01 RX ADMIN — Medication 1 APPLICATION(S): at 05:10

## 2024-03-01 RX ADMIN — Medication 2 SPRAY(S): at 05:10

## 2024-03-01 RX ADMIN — RALOXIFENE HYDROCHLORIDE 60 MILLIGRAM(S): 60 TABLET, COATED ORAL at 12:29

## 2024-03-01 RX ADMIN — Medication 3 MILLILITER(S): at 14:07

## 2024-03-01 RX ADMIN — Medication 2 SPRAY(S): at 14:36

## 2024-03-01 RX ADMIN — Medication 3 MILLILITER(S): at 20:04

## 2024-03-01 RX ADMIN — LEVETIRACETAM 400 MILLIGRAM(S): 250 TABLET, FILM COATED ORAL at 05:07

## 2024-03-01 RX ADMIN — Medication 250 MILLIGRAM(S): at 05:09

## 2024-03-01 RX ADMIN — GABAPENTIN 300 MILLIGRAM(S): 400 CAPSULE ORAL at 05:09

## 2024-03-01 NOTE — PROGRESS NOTE ADULT - SUBJECTIVE AND OBJECTIVE BOX
Over Night Events: events noted, on HHFNC palliative noted    PHYSICAL EXAM    ICU Vital Signs Last 24 Hrs  T(C): 35.9 (01 Mar 2024 04:00), Max: 36.9 (29 Feb 2024 08:27)  T(F): 96.7 (01 Mar 2024 04:00), Max: 98.5 (01 Mar 2024 00:00)  HR: 94 (01 Mar 2024 04:00) (64 - 139)  BP: 84/51 (01 Mar 2024 04:00) (76/53 - 100/64)  BP(mean): 58 (01 Mar 2024 04:00) (58 - 75)  RR: 18 (01 Mar 2024 04:00) (18 - 20)  SpO2: 92% (01 Mar 2024 04:00) (92% - 100%)    O2 Parameters below as of 29 Feb 2024 21:00  Patient On (Oxygen Delivery Method): nasal cannula, high flow            General: ill looking  Lungs: Bilateral rhonchi  Cardiovascular: HARPREET 2.6  Abdomen: Soft, Positive BS  Extremities: No clubbing   Not following commands      02-29-24 @ 07:01  -  03-01-24 @ 07:00  --------------------------------------------------------  IN:    Enteral Tube Flush: 300 mL    IV PiggyBack: 450 mL    Jevity 1.2: 600 mL  Total IN: 1350 mL    OUT:  Total OUT: 0 mL    Total NET: 1350 mL          LABS:                          7.9    14.48 )-----------( 635      ( 29 Feb 2024 06:49 )             26.5                                               02-29    141  |  101  |  12  ----------------------------<  150<H>  4.8   |  32  |  0.5<L>    Ca    8.6      29 Feb 2024 06:49  Mg     2.3     02-29    TPro  6.4  /  Alb  2.7<L>  /  TBili  <0.2  /  DBili  x   /  AST  14  /  ALT  12  /  AlkPhos  106  02-29                                             Urinalysis Basic - ( 29 Feb 2024 06:49 )    Color: x / Appearance: x / SG: x / pH: x  Gluc: 150 mg/dL / Ketone: x  / Bili: x / Urobili: x   Blood: x / Protein: x / Nitrite: x   Leuk Esterase: x / RBC: x / WBC x   Sq Epi: x / Non Sq Epi: x / Bacteria: x                                                  LIVER FUNCTIONS - ( 29 Feb 2024 06:49 )  Alb: 2.7 g/dL / Pro: 6.4 g/dL / ALK PHOS: 106 U/L / ALT: 12 U/L / AST: 14 U/L / GGT: x                                                                                                                                       MEDICATIONS  (STANDING):  acetylcysteine 20%  Inhalation 4 milliLiter(s) Inhalation every 6 hours  albuterol/ipratropium for Nebulization 3 milliLiter(s) Nebulizer every 6 hours  AQUAPHOR (petrolatum Ointment) 1 Application(s) Topical two times a day  artificial  tears Solution 1 Drop(s) Both EYES two times a day  calcium carbonate   1250 mG (OsCal) 1 Tablet(s) Oral two times a day  caspofungin IVPB      caspofungin IVPB 50 milliGRAM(s) IV Intermittent every 24 hours  chlorhexidine 2% Cloths 1 Application(s) Topical daily  chlorhexidine 2% Cloths 1 Application(s) Topical daily  enoxaparin Injectable 50 milliGRAM(s) SubCutaneous every 12 hours  gabapentin 300 milliGRAM(s) Oral every 12 hours  levETIRAcetam  IVPB 500 milliGRAM(s) IV Intermittent two times a day  melatonin 3 milliGRAM(s) Oral at bedtime  meropenem  IVPB 1000 milliGRAM(s) IV Intermittent every 8 hours  meropenem  IVPB      midodrine. 20 milliGRAM(s) Oral three times a day  pantoprazole  Injectable 40 milliGRAM(s) IV Push every 24 hours  polyethylene glycol 3350 17 Gram(s) Oral at bedtime  raloxifene 60 milliGRAM(s) Oral daily  saccharomyces boulardii 250 milliGRAM(s) Oral two times a day  senna 2 Tablet(s) Oral at bedtime  sodium chloride 0.65% Nasal 2 Spray(s) Both Nostrils three times a day  sodium chloride 0.9% Bolus 500 milliLiter(s) IV Bolus once    MEDICATIONS  (PRN):  aluminum hydroxide/magnesium hydroxide/simethicone Suspension 30 milliLiter(s) Oral every 4 hours PRN Dyspepsia  ondansetron Injectable 4 milliGRAM(s) IV Push every 8 hours PRN Nausea and/or Vomiting

## 2024-03-01 NOTE — PROGRESS NOTE ADULT - ASSESSMENT
58F w/ PMHx Down Syndrome, nonverbal at baseline, Hypothyroidism, Cerebral palsy and Seizure Disorders presents to the ED from nursing facility/group home (\[facility]""O) presented to ED for respiratory distress and high grade fever. Patient found to be septic on admission. Admitted to SDU for management of acute respiratory distress 2/2 CAP/Aspiration PNA (20 Jan 2024 18:22)  While pt was on the floor she developed dyspnea after swallow evaluation, was spiking high grade fever, consulted by pulmonary/critical care and was upgraded back to SDU.    # Sepsis POA / Acute hypoxic resp failure / RSV bronchiolitis /  H/o Dysphagia/ suspected aspiration PNA /high grade fever   - off levophed   - continue midodrine   - continue caspo, danna   - ID following   - Failed FEES, put NG tube for feedings and medications, pt'll likely need PEG/trach when stable, consulted by GI   - supplement oxygen, monitor pulse Ox, on high flow oxygen for weeks   - c/w duoneb  - pulmonary is following, recommendations noted  - aggressive chest PT with vest, aspiration precautions and suction   - continue caspo, danna until 3/10 per ID       # Elevated Troponin , type 2 MI/  Sinus tachycardia  - c/w telemetry monitoring   - Repeat EKG: Normal sinus rhythm  - c/w  metoprolol  -  maintain fluid balance   - TTE 2/19 normal EF no DD or valve abnormalities    # Seizure Disorder  - c/w  Keppra   - seizure precautions  - keep Mg above 2.0     # H/o lower ext DVT  - eliquis to lovenox     # Hypothyroidism  - Thyroid Stimulating Hormone, Serum: 0.51 uIU/mL (01.21.24 @ 06:34)    # Normocytic Anemia, anemia of chronic disease   - monitor H/H, keep Hb above 7.5     # Down Syndrome/  Cerebral Palsy  - supportive care  - prevent falls and aspiration   - failed speech and swallow, needs PEG   - on Raloxifene     #Supportive Management:  Dispo: sdu  DVT Ppx: enoxaparin Injectable 50 milliGRAM(s) SubCutaneous every 12 hours  GI Ppx: pantoprazole  Injectable 40 milliGRAM(s) IV Push every 24 hours  Diet:  Diet, NPO with Tube Feed:   Tube Feeding Modality: Nasogastric  Jevity 1.2 Juventino      # 2/29/24 DNR/DNI  La Nena Vasquez [phone]  Full code, palliative following for ongoing GOC, pt is [facility], paperwork to discuss code status faxed to CAB   Pending: taper down supplemental 02, recall GI once o2 requirements decrease for PEG, fu palliative

## 2024-03-01 NOTE — PROGRESS NOTE ADULT - SUBJECTIVE AND OBJECTIVE BOX
24H events:    Patient is a 58y old Female who presents with a chief complaint of Respiratory Distress (01 Mar 2024 07:26)  Primary diagnosis of Sepsis with acute hypoxic respiratory failure  Today is hospital day 41d. This morning patient was seen and examined at bedside, resting comfortably in bed.    No acute or major events overnight. Hemodynamically stable, tolerating oral diet, voiding appropriately with appropriate bowel movements.     patient made DNR/DNI on 2/29/24  Code Status:    Family communication:  Contact date:  Name of person contacted:  Relationship to patient:  Communication details:  What matters most:    PAST MEDICAL & SURGICAL HISTORY  Down syndrome    Osteoporosis    Mild anemia    Neuropathy    S/P debridement  of R hip on 3/2/21      SOCIAL HISTORY:  Social History:      ALLERGIES:  No Known Allergies    MEDICATIONS:  STANDING MEDICATIONS  acetylcysteine 20%  Inhalation 4 milliLiter(s) Inhalation every 6 hours  albuterol/ipratropium for Nebulization 3 milliLiter(s) Nebulizer every 6 hours  AQUAPHOR (petrolatum Ointment) 1 Application(s) Topical two times a day  artificial  tears Solution 1 Drop(s) Both EYES two times a day  calcium carbonate   1250 mG (OsCal) 1 Tablet(s) Oral two times a day  caspofungin IVPB      caspofungin IVPB 50 milliGRAM(s) IV Intermittent every 24 hours  chlorhexidine 2% Cloths 1 Application(s) Topical daily  chlorhexidine 2% Cloths 1 Application(s) Topical daily  enoxaparin Injectable 50 milliGRAM(s) SubCutaneous every 12 hours  gabapentin 300 milliGRAM(s) Oral every 12 hours  levETIRAcetam  IVPB 500 milliGRAM(s) IV Intermittent two times a day  melatonin 3 milliGRAM(s) Oral at bedtime  meropenem  IVPB      meropenem  IVPB 1000 milliGRAM(s) IV Intermittent every 8 hours  midodrine. 20 milliGRAM(s) Oral three times a day  pantoprazole  Injectable 40 milliGRAM(s) IV Push every 24 hours  polyethylene glycol 3350 17 Gram(s) Oral at bedtime  raloxifene 60 milliGRAM(s) Oral daily  saccharomyces boulardii 250 milliGRAM(s) Oral two times a day  senna 2 Tablet(s) Oral at bedtime  sodium chloride 0.65% Nasal 2 Spray(s) Both Nostrils three times a day  sodium chloride 0.9% Bolus 500 milliLiter(s) IV Bolus once    PRN MEDICATIONS  aluminum hydroxide/magnesium hydroxide/simethicone Suspension 30 milliLiter(s) Oral every 4 hours PRN  ondansetron Injectable 4 milliGRAM(s) IV Push every 8 hours PRN    VITALS:   T(F): 97.1  HR: 91  BP: 79/53  RR: 20  SpO2: 94%    PHYSICAL EXAM:  Gen: NAD, resting in bed  HEENT: Normocephalic, atraumatic  Neck: supple, no lymphadenopathy  CV: Regular rate & regular rhythm  Lungs: decrease bs b/l, no wheeze, on HFNC  Abdomen: Soft, NTND+ BS present, NGT  Ext: Warm, well perfused no CCE  Neuro: non focal, awake, does not follow commands  Skin: no rash, no erythema on extremeties      (  ) Indwelling Madsen Catheter:   Date insterted:    Reason (  ) Critical illness     (  ) urinary retention    (  ) Accurate Ins/Outs Monitoring     (  ) CMO patient    (  ) Central Line:   Date inserted:  Location: (  ) Right IJ     (  ) Left IJ     (  ) Right Fem     (  ) Left Fem    (  ) SPC        (  ) pigtail       (  ) PEG tube       (  ) colostomy       (  ) jejunostomy  (  ) U-Dall    LABS:                        9.5    10.68 )-----------( 544      ( 01 Mar 2024 06:25 )             32.2     03-01    135  |  95<L>  |  10  ----------------------------<  159<H>  4.9   |  27  |  0.5<L>    Ca    8.9      01 Mar 2024 06:25  Mg     2.5     03-01    TPro  6.8  /  Alb  2.7<L>  /  TBili  <0.2  /  DBili  x   /  AST  18  /  ALT  11  /  AlkPhos  105  03-01      Urinalysis Basic - ( 01 Mar 2024 06:25 )    Color: x / Appearance: x / SG: x / pH: x  Gluc: 159 mg/dL / Ketone: x  / Bili: x / Urobili: x   Blood: x / Protein: x / Nitrite: x   Leuk Esterase: x / RBC: x / WBC x   Sq Epi: x / Non Sq Epi: x / Bacteria: x                RADIOLOGY:    RADIOLOGY

## 2024-03-01 NOTE — PROGRESS NOTE ADULT - SUBJECTIVE AND OBJECTIVE BOX
HPI: 58F with Down syndrome, nonverbal at baseline, hypothyroidism, CP, and seizure disorder here from [facility] with fever and respiratory distress. Found to be septic on admission, from CAP/aspiration PNA, on IV vanco and meropenem, with course c/b continued fevers, and bacteremia with S. hominis. Also failed FEES, has NGT in place and will likely need PEG. Is requiring HFNC alternating with BiPAP. Is also on midodrine for hypotension. Patient is full code. Of note patient is under [facility]. Palliative care consulted for [facility].    INTERVAL EVENTS  2/29: patient appears comfortable overall, group home staff at bedside  3/1: patient appears more comfortable today, no objective signs of pain or discomfort    ADVANCE DIRECTIVES:    [X ] Full Code [ ] DNR  MOLST  [ ]  Living Will  [ ]   DECISION MAKER(s):  [ ] Health Care Proxy(s)  [ ] Surrogate(s)  [ ] Guardian           Name(s): Phone Number(s): [facility]    BASELINE (I)ADL(s) (prior to admission):  Elliott: [ ]Total  [ ] Moderate [ ]Dependent  Palliative Performance Status Version 2:         %    http://npcrc.org/files/news/palliative_performance_scale_ppsv2.pdf    Allergies    No Known Allergies    Intolerances    MEDICATIONS  (STANDING):  acetylcysteine 20%  Inhalation 4 milliLiter(s) Inhalation every 6 hours  albuterol/ipratropium for Nebulization 3 milliLiter(s) Nebulizer every 6 hours  AQUAPHOR (petrolatum Ointment) 1 Application(s) Topical two times a day  artificial  tears Solution 1 Drop(s) Both EYES two times a day  calcium carbonate   1250 mG (OsCal) 1 Tablet(s) Oral two times a day  caspofungin IVPB 50 milliGRAM(s) IV Intermittent every 24 hours  caspofungin IVPB      chlorhexidine 2% Cloths 1 Application(s) Topical daily  chlorhexidine 2% Cloths 1 Application(s) Topical daily  enoxaparin Injectable 50 milliGRAM(s) SubCutaneous every 12 hours  gabapentin 300 milliGRAM(s) Oral every 12 hours  levETIRAcetam  IVPB 500 milliGRAM(s) IV Intermittent two times a day  melatonin 3 milliGRAM(s) Oral at bedtime  meropenem  IVPB      meropenem  IVPB 1000 milliGRAM(s) IV Intermittent every 8 hours  midodrine. 20 milliGRAM(s) Oral three times a day  pantoprazole  Injectable 40 milliGRAM(s) IV Push every 24 hours  polyethylene glycol 3350 17 Gram(s) Oral at bedtime  raloxifene 60 milliGRAM(s) Oral daily  saccharomyces boulardii 250 milliGRAM(s) Oral two times a day  senna 2 Tablet(s) Oral at bedtime  sodium chloride 0.65% Nasal 2 Spray(s) Both Nostrils three times a day  sodium chloride 0.9% Bolus 500 milliLiter(s) IV Bolus once    MEDICATIONS  (PRN):  aluminum hydroxide/magnesium hydroxide/simethicone Suspension 30 milliLiter(s) Oral every 4 hours PRN Dyspepsia  ondansetron Injectable 4 milliGRAM(s) IV Push every 8 hours PRN Nausea and/or Vomiting      PRESENT SYMPTOMS: [X ]Unable to obtain due to poor mentation   Source if other than patient:  [ ]Family   [ ]Team     Pain: [ ]yes [ ]no  QOL impact -   Location -                    Aggravating factors -  Quality -  Radiation -  Timing-  Severity (0-10 scale):  Minimal acceptable level (0-10 scale):     CPOT:    https://www.[facility].org/getattachment/[email or web address]/Critical-Care-Pain-Observation-Tool-(CPOT)    PAIN AD Score: 0  http://geriatrictoolkit.[facility]/cog/painad.pdf (press ctrl +  left click to view)    Dyspnea:                           [ ]None[ ]Mild [ ]Moderate [ ]Severe     Respiratory Distress Observation Scale (RDOS): 2  A score of 0 to 2 signifies little or no respiratory distress, 3 signifies mild distress, scores 4 to 6 indicate moderate distress, and scores greater than 7 signify severe distress  https://www.[facility].ca/sites/default/files/PDFS/059334-zodysfgebcb-xdwjarud-pucpvuiwinn-uuayn.pdf    Anxiety:                             [ ]None[ ]Mild [ ]Moderate [ ]Severe   Fatigue:                             [ ]None[ ]Mild [ ]Moderate [ ]Severe   Nausea:                             [ ]None[ ]Mild [ ]Moderate [ ]Severe   Loss of appetite:              [ ]None[ ]Mild [ ]Moderate [ ]Severe   Constipation:                    [ ]None[ ]Mild [ ]Moderate [ ]Severe    Other Symptoms:  [ ]All other review of systems negative     Palliative Performance Status Version 2:         %    http://npcrc.org/files/news/palliative_performance_scale_ppsv2.pdf  PHYSICAL EXAM:  Vital Signs Last 24 Hrs  T(C): 36.2 (01 Mar 2024 11:43), Max: 36.9 (01 Mar 2024 00:00)  T(F): 97.1 (01 Mar 2024 11:43), Max: 98.5 (01 Mar 2024 00:00)  HR: 91 (01 Mar 2024 11:43) (64 - 109)  BP: 79/53 (01 Mar 2024 11:43) (76/53 - 85/51)  BP(mean): 62 (01 Mar 2024 11:43) (58 - 63)  RR: 20 (01 Mar 2024 11:43) (18 - 20)  SpO2: 95% (01 Mar 2024 13:01) (92% - 100%)    Parameters below as of 01 Mar 2024 13:01  Patient On (Oxygen Delivery Method): nasal cannula, high flow  O2 Flow (L/min): 40  O2 Concentration (%): 40  GENERAL:  [X ] No acute distress [ ]Lethargic  [ ]Unarousable  [ ]Verbal  [ ]Non-Verbal [ ]Cachexia    BEHAVIORAL/PSYCH:  [ ]Alert and Oriented x  [ ] Anxiety [ ] Delirium [ ] Agitation [X ] Calm   EYES: [ X] No scleral icterus [ ] Scleral icterus [ ] Closed  ENMT:  [ ]Dry mouth  [ ]No external oral lesions [ X] No external ear or nose lesions  CARDIOVASCULAR:  [ ]Regular [ ]Irregular [ ]Tachy [ ]Not Tachy  [ ]Raheem [ ] Edema [ ] No edema  PULMONARY:  [ ]Tachypnea  [ ]Audible excessive secretions [X ] No labored breathing [ ] labored breathing  GASTROINTESTINAL: [ ]Soft  [ ]Distended  [ X]Not distended [ ]Non tender [ ]Tender  MUSCULOSKELETAL: [ ]No clubbing [ ] clubbing  [ X] No cyanosis [ ] cyanosis  NEUROLOGIC: [ ]No focal deficits  [ ]Follows commands  [ ]Does not follow commands  [X ]Cognitive impairment  [ ]Dysphagia  [ ]Dysarthria  [ ]Paresis   SKIN: [ ] Jaundiced [X ] Non-jaundiced [ ]Rash [ ]No Rash [ ] Warm [ ] Dry  MISC/LINES: [ ] ET tube [ ] Trach [ ]NGT/OGT [ ]PEG [ ]Madsen    CRITICAL CARE:  [ ] Shock Present  [ ]Septic [ ]Cardiogenic [ ]Neurologic [ ]Hypovolemic  [ ]  Vasopressors [ ]  Inotropes   [ ]Respiratory failure present [ ]Mechanical ventilation [ ]Non-invasive ventilatory support [ ]High flow  [ ]Acute  [ ]Chronic [ ]Hypoxic  [ ]Hypercarbic [ ]Other  [ ]Other organ failure     LABS: reviewed by me                          9.5    10.68 )-----------( 544      ( 01 Mar 2024 06:25 )             32.2     03-01    135  |  95<L>  |  10  ----------------------------<  159<H>  4.9   |  27  |  0.5<L>    Ca    8.9      01 Mar 2024 06:25  Mg     2.5     03-01          RADIOLOGY & ADDITIONAL STUDIES: reviewed by m    CXR 2/5/24    Support devices: Enteric tube satisfactory position.    Cardiac/ Mediastinum: unremarkable    Lungs/ Pleura: Diminishing left lung opacity/effusion. Stable smaller   right basilar opacity. No pneumothorax.    Skeletal/ soft tissues: Stable    PROTEIN CALORIE MALNUTRITION PRESENT: [ ]mild [ ]moderate [ ]severe [ ]underweight [ ]morbid obesity  https://www.andeal.org/vault/2440/web/files/ONC/Table_Clinical%20Characteristics%20to%20Document%20Malnutrition-White%20JV%20et%20al%202012.pdf    Height (cm): 136.4 (01-24-24 @ 09:52), 154.9 (11-17-23 @ 15:00), 145 (10-02-23 @ 12:00)  Weight (kg): 52.3 (01-21-24 @ 08:00), 60 (11-17-23 @ 15:00), 55.3 (10-02-23 @ 12:00)  BMI (kg/m2): 28.1 (01-24-24 @ 09:52), 21.8 (01-21-24 @ 08:00), 25 (11-17-23 @ 15:00)    [ ]PPSV2 < or = to 30% [ ]significant weight loss  [ ]poor nutritional intake  [ ]anasarca      [ ]Artificial Nutrition          Palliative Care Spiritual/Emotional Screening Tool Question  Severity (0-4):                    OR                    [X ] Unable to determine/NA  Score of 2 or greater indicates recommendation of Chaplaincy referral  Chaplaincy Referral: [ ] Yes [ ] Refused [ ] Following     Caregiver [place]:  [ ] Yes [ ] No    OR    [x ] Unable to determine. Will assess at later time if appropriate.  Social Work Referral [ ]  Patient and Family Centered Care Referral [ ]    Anticipatory Grief Present: [ ] Yes [ ] No    OR     [ x] Unable to determine. Will assess at later time if appropriate.  Social Work Referral [ ]  Patient and Family Centered Care Referral [ ]    REFERRALS:   [ ]Chaplaincy  [ ]Hospice  [ ]Child Life  [ ]Social Work  [ ]Case management [ ]Holistic Therapy     Palliative care education provided to patient and/or family    Goals of Care Document:     ______________  Wu Jenkins MD  Palliative Medicine  [facility]   of Geriatric and Palliative Medicine  [phone]

## 2024-03-01 NOTE — PROGRESS NOTE ADULT - SUBJECTIVE AND OBJECTIVE BOX
JERICHO TEA  58y, Female  Allergy: No Known Allergies      LOS  41d    CHIEF COMPLAINT: Respiratory Distress (01 Mar 2024 11:46)      INTERVAL EVENTS/HPI  - T(F): , Max: 98.5 (03-01-24 @ 00:00) hfnc , low bp   - WBC Count: 10.68 (03-01-24 @ 06:25)  WBC Count: 14.48 (02-29-24 @ 06:49)     - Creatinine: 0.5 (03-01-24 @ 06:25)  Creatinine: 0.5 (02-29-24 @ 06:49)     -   -   -     ROS  cannot obtain secondary to patient's sedation and/or mental status    VITALS:  T(F): 97.1, Max: 98.5 (03-01-24 @ 00:00)  HR: 91  BP: 79/53  RR: 20Vital Signs Last 24 Hrs  T(C): 36.2 (01 Mar 2024 11:43), Max: 36.9 (01 Mar 2024 00:00)  T(F): 97.1 (01 Mar 2024 11:43), Max: 98.5 (01 Mar 2024 00:00)  HR: 91 (01 Mar 2024 11:43) (64 - 109)  BP: 79/53 (01 Mar 2024 11:43) (76/53 - 85/51)  BP(mean): 62 (01 Mar 2024 11:43) (58 - 63)  RR: 20 (01 Mar 2024 11:43) (18 - 20)  SpO2: 95% (01 Mar 2024 13:01) (92% - 100%)    Parameters below as of 01 Mar 2024 13:01  Patient On (Oxygen Delivery Method): nasal cannula, high flow  O2 Flow (L/min): 40  O2 Concentration (%): 40    PHYSICAL EXAM:  Gen: chronically ill appearing  hfnc   HEENT: Normocephalic, atraumatic  Neck: supple, no lymphadenopathy  CV: Regular rate & regular rhythm  Lungs: decreased BS at bases, no fremitus  Abdomen: Soft, BS present  Ext: Warm, well perfused  Neuro: non focal, not following commands  Skin: no rash, no erythema  Lines: no phlebitis   FH: Non-contributory  Social Hx: Non-contributory    TESTS & MEASUREMENTS:                        9.5    10.68 )-----------( 544      ( 01 Mar 2024 06:25 )             32.2     03-01    135  |  95<L>  |  10  ----------------------------<  159<H>  4.9   |  27  |  0.5<L>    Ca    8.9      01 Mar 2024 06:25  Mg     2.5     03-01    TPro  6.8  /  Alb  2.7<L>  /  TBili  <0.2  /  DBili  x   /  AST  18  /  ALT  11  /  AlkPhos  105  03-01      LIVER FUNCTIONS - ( 01 Mar 2024 06:25 )  Alb: 2.7 g/dL / Pro: 6.8 g/dL / ALK PHOS: 105 U/L / ALT: 11 U/L / AST: 18 U/L / GGT: x           Urinalysis Basic - ( 01 Mar 2024 06:25 )    Color: x / Appearance: x / SG: x / pH: x  Gluc: 159 mg/dL / Ketone: x  / Bili: x / Urobili: x   Blood: x / Protein: x / Nitrite: x   Leuk Esterase: x / RBC: x / WBC x   Sq Epi: x / Non Sq Epi: x / Bacteria: x        Culture - Blood (collected 02-25-24 @ 11:47)  Source: .Blood None  Preliminary Report (02-29-24 @ 20:01):    No growth at 4 days    Culture - Blood (collected 02-19-24 @ 11:23)  Source: .Blood None  Final Report (02-24-24 @ 20:01):    No growth at 5 days    Culture - Blood (collected 02-17-24 @ 20:41)  Source: .Blood None  Final Report (02-23-24 @ 06:00):    No growth at 5 days    Culture - Blood (collected 02-12-24 @ 11:17)  Source: .Blood None  Final Report (02-17-24 @ 21:00):    No growth at 5 days    Culture - Blood (collected 02-09-24 @ 11:57)  Source: .Blood None  Final Report (02-14-24 @ 22:00):    No growth at 5 days    Culture - Blood (collected 02-09-24 @ 11:57)  Source: .Blood None  Final Report (02-14-24 @ 22:00):    No growth at 5 days    Culture - Blood (collected 02-03-24 @ 11:13)  Source: .Blood None  Final Report (02-08-24 @ 20:00):    No growth at 5 days    Culture - Blood (collected 02-01-24 @ 11:58)  Source: .Blood None  Final Report (02-06-24 @ 23:06):    No growth at 5 days    Culture - Urine (collected 02-01-24 @ 11:40)  Source: Clean Catch Clean Catch (Midstream)  Final Report (02-03-24 @ 00:06):    >=3 organisms. Probable collection contamination.    Culture - Blood (collected 01-31-24 @ 18:21)  Source: .Blood None  Final Report (02-06-24 @ 01:01):    No growth at 5 days    Culture - Blood (collected 01-31-24 @ 18:21)  Source: .Blood None  Final Report (02-06-24 @ 01:01):    No growth at 5 days            INFECTIOUS DISEASES TESTING  Fungitell: 73 (02-23-24 @ 18:19)  MRSA PCR Result.: Negative (02-20-24 @ 13:42)  Fungitell: 76 (02-19-24 @ 16:00)  Procalcitonin, Serum: 0.16 (02-19-24 @ 16:00)  Procalcitonin, Serum: 0.20 (02-19-24 @ 11:23)  Rapid RVP Result: NotDetec (02-17-24 @ 19:06)  Procalcitonin, Serum: 0.11 (02-17-24 @ 10:41)  MRSA PCR Result.: Negative (02-15-24 @ 06:20)  Procalcitonin, Serum: 0.10 (02-12-24 @ 11:17)  Rapid RVP Result: NotDetec (02-12-24 @ 10:28)  MRSA PCR Result.: Negative (02-12-24 @ 10:28)  Fungitell: 63 (02-06-24 @ 11:27)  Fungitell: 65 (02-06-24 @ 04:43)  Rapid RVP Result: NotDetec (02-04-24 @ 10:28)  MRSA PCR Result.: Negative (02-03-24 @ 21:32)  Procalcitonin, Serum: 0.15 (02-03-24 @ 11:13)  Procalcitonin, Serum: 0.22 (02-01-24 @ 16:00)  MRSA PCR Result.: Negative (01-22-24 @ 05:30)  Legionella Antigen, Urine: Negative (01-21-24 @ 05:00)  Rapid RVP Result: Detected (01-21-24 @ 00:58)  Procalcitonin, Serum: 0.51 (01-20-24 @ 21:23)  Fungitell: 325 (01-20-24 @ 21:23)  Fungitell: 138 (11-07-23 @ 08:08)  Fungitell: 115 (11-02-23 @ 11:40)  Procalcitonin, Serum: 0.04 (11-02-23 @ 11:40)  Procalcitonin, Serum: 0.26 (10-24-23 @ 11:00)  MRSA PCR Result.: Negative (10-17-23 @ 17:20)  Fungitell: >500 (10-17-23 @ 17:20)  Procalcitonin, Serum: 4.36 (10-17-23 @ 17:20)  Procalcitonin, Serum: 4.18 (10-17-23 @ 12:35)  Procalcitonin, Serum: 0.07 (10-15-23 @ 07:28)  COVID-19 PCR: NotDetec (10-12-23 @ 09:14)  Procalcitonin, Serum: 0.40 (10-07-23 @ 10:54)  Legionella Antigen, Urine: Negative (09-30-23 @ 14:36)  MRSA PCR Result.: Negative (09-29-23 @ 16:50)  Procalcitonin, Serum: 9.78 (08-12-23 @ 11:25)  MRSA PCR Result.: Negative (08-12-23 @ 06:10)  Rapid RVP Result: NotDetec (08-12-23 @ 01:33)  Procalcitonin, Serum: 0.63 (08-10-23 @ 08:46)  Procalcitonin, Serum: 7.82 (08-04-23 @ 16:13)  MRSA PCR Result.: Negative (08-04-23 @ 14:52)  Rapid RVP Result: NotDetec (08-04-23 @ 03:00)      INFLAMMATORY MARKERS  Sedimentation Rate, Erythrocyte: 100 mm/Hr (02-03-24 @ 11:13)  C-Reactive Protein, Serum: 214.2 mg/L (02-03-24 @ 11:13)  C-Reactive Protein, Serum: 132.3 mg/L (02-01-24 @ 16:00)  Sedimentation Rate, Erythrocyte: 67 mm/Hr (10-15-23 @ 07:28)      RADIOLOGY & ADDITIONAL TESTS:  I have personally reviewed the last available Chest xray  CXR      CT      CARDIOLOGY TESTING  12 Lead ECG:   Ventricular Rate 124 BPM    Atrial Rate 124 BPM    P-R Interval 114 ms    QRS Duration 64 ms    Q-T Interval 328 ms    QTC Calculation(Bazett) 471 ms    P Axis 31 degrees    R Axis 38 degrees    T Axis 49 degrees    Diagnosis Line Sinus tachycardia  Possible Left atrial enlargement  Borderline ECG    Confirmed by MARJAN MENDES MD ([id number]) on 2/23/2024 1:23:43 PM (02-23-24 @ 11:46)      MEDICATIONS  acetylcysteine 20%  Inhalation 4  albuterol/ipratropium for Nebulization 3  AQUAPHOR (petrolatum Ointment) 1  artificial  tears Solution 1  calcium carbonate   1250 mG (OsCal) 1  caspofungin IVPB   caspofungin IVPB 50  chlorhexidine 2% Cloths 1  chlorhexidine 2% Cloths 1  enoxaparin Injectable 50  gabapentin 300  levETIRAcetam  IVPB 500  melatonin 3  meropenem  IVPB 1000  meropenem  IVPB   midodrine. 20  pantoprazole  Injectable 40  polyethylene glycol 3350 17  raloxifene 60  saccharomyces boulardii 250  senna 2  sodium chloride 0.65% Nasal 2  sodium chloride 0.9% Bolus 500      WEIGHT  Weight (kg): 52.3 (01-21-24 @ 08:00)  Creatinine: 0.5 mg/dL (03-01-24 @ 06:25)      ANTIBIOTICS:  caspofungin IVPB      caspofungin IVPB 50 milliGRAM(s) IV Intermittent every 24 hours  meropenem  IVPB      meropenem  IVPB 1000 milliGRAM(s) IV Intermittent every 8 hours      All available historical records have been reviewed

## 2024-03-01 NOTE — PROGRESS NOTE ADULT - ASSESSMENT
58F with Down syndrome, nonverbal at baseline, hypothyroidism, CP, and seizure disorder here from [facility] with fever and respiratory distress. Found to be septic on admission, from CAP/aspiration PNA, on IV vanco and meropenem, with course c/b continued fevers, and bacteremia with S. hominis. Also failed FEES, has NGT in place and will likely need PEG. Is requiring HFNC alternating with BiPAP. Is also on midodrine for hypotension. Patient is full code. Of note patient is under [place] [facility]. Palliative care consulted for [facility].

## 2024-03-01 NOTE — PROGRESS NOTE ADULT - SUBJECTIVE AND OBJECTIVE BOX
58F w/ PMHx Down Syndrome, nonverbal at baseline, Hypothyroidism, Cerebral palsy and Seizure Disorders presents to the ED from nursing facility/group home ([facility]) presented to ED for respiratory distress and high grade fever. Patient found to be septic on admission.Admitted to SDU for management of acute respiratory distress 2/2 CAP/Aspiration PNA (20 Jan 2024 18:22)  While pt was on the floor she developed dyspnea after swallow evaluation, was spiking high grade fever, consulted by pulmonary/critical care and was upgraded back to SDU.  Pt developed left lung white out, improved after aggressive chest PT, treated with Meropenem and caspofungin, ID is following, her overall prognosis is very poor, she might need trach and PEG in the nearest future , a meeting with facility staff and state took place on 2/14.   Pt completed course o Meropenem, Levofloxacin added on 2/15 ( pt spiked fever), Meropenem resumed on 2/18,  she was tapered off pressure support, requires  high flow oxygen fo a while in SDU  Hb dropped without acute blood loss, will  monitor closely ( pt is o full AC). Now pt is off pressure support.   Today pt is awake, comfortable, calm.     PAST MEDICAL & SURGICAL HISTORY:  Down syndrome  Osteoporosis  Mild anemia  Neuropathy  S/P debridement  of R hip on 3/2/21      Vital Signs:  T(C): 36.2 (01 Mar 2024 11:43), Max: 36.9 (01 Mar 2024 00:00)  T(F): 97.1 (01 Mar 2024 11:43), Max: 98.5 (01 Mar 2024 00:00)  HR: 91 (01 Mar 2024 11:43) (64 - 109)  BP: 79/53 (01 Mar 2024 11:43) (76/53 - 85/51)  BP(mean): 62 (01 Mar 2024 11:43) (58 - 63)  RR: 20 (01 Mar 2024 11:43) (18 - 20)  SpO2: 95% (01 Mar 2024 13:01) (92% - 100%)    Parameters below as of 01 Mar 2024 13:01  Patient On (Oxygen Delivery Method): nasal cannula, high flow  O2 Flow (L/min): 40  O2 Concentration (%): 40      PHYSICAL EXAM:  GENERAL:  NAD chronically ill appearing, mentally challenged   SKIN: No rashes or lesions  HEENT: Atraumatic. Normocephalic   NECK: Supple, No JVD.    PULMONARY: decreased breath sounds B/L, poor air entry   CVS: Normal S1, S2. Rate and Rhythm are regular.    ABDOMEN/GI: Soft, Nontender, Nondistended.  MSK:  contracted LE   NEUROLOGIC: does not follow commands, opens eyes spontaneously   PSYCH: Alert & oriented x 0    Consultant(s) Notes Reviewed:  [x ] YES  [ ] NO  Care Discussed with Consultants/Other Providers [ x] YES  [ ] NO    LABS:                                   9.5    10.68 )-----------( 544      ( 01 Mar 2024 06:25 )             32.2   03-01    135  |  95<L>  |  10  ----------------------------<  159<H>  4.9   |  27  |  0.5<L>    Ca    8.9      01 Mar 2024 06:25  Mg     2.5     03-01    TPro  6.8  /  Alb  2.7<L>  /  TBili  <0.2  /  DBili  x   /  AST  18  /  ALT  11  /  AlkPhos  105  03-01          Culture - Blood (collected 25 Feb 2024 11:47)  Source: .Blood None  Preliminary Report (26 Feb 2024 20:02):    No growth at 24 hours        RADIOLOGY & ADDITIONAL TESTS:  Imaging or report Personally Reviewed:  [x] YES  [ ] NO  EKG reviewed: [x] YES  [ ] NO    < from: Xray Chest 1 View- PORTABLE-Routine (02.28.24 @ 05:32) >    Impression:    Bilateral opacifications, left greater than right, stable. Support   devices as described.    < end of copied text >  < from: CT Chest w/ IV Cont (02.21.24 @ 00:09) >  Impression:    Bilateral pneumonia, left worse than right, with a left upper lobe   thick-walled cavity.    Enteric catheter as described. Note that by the time that this report was   dictated, this issue had been tended to..    < end of copied text >  < from: CT Abdomen and Pelvis w/ IV Cont (02.21.24 @ 00:10) >    IMPRESSION:  1.  No CT evidence of an acuteabdominopelvic pathology.    See separately dictated CT chest report for intrathoracic findings.    < end of copied text >  < from: Xray Chest 1 View- PORTABLE-Routine (03.01.24 @ 06:58) >  impression:    EKG leads overlie thorax, obscuring anatomy.    Support devices: NG tube coursing below the left hemidiaphragm. The tip   is not included..    Cardiac/ Mediastinum: Stable    Lungs/ Pleura: Unchanged left greater than right opacities. No   pneumothorax    Skeletal/ soft tissues: Stable    < end of copied text >        MEDICATIONS  (STANDING):  acetylcysteine 20%  Inhalation 4 milliLiter(s) Inhalation every 6 hours  albuterol/ipratropium for Nebulization 3 milliLiter(s) Nebulizer every 6 hours  AQUAPHOR (petrolatum Ointment) 1 Application(s) Topical two times a day  artificial  tears Solution 1 Drop(s) Both EYES two times a day  calcium carbonate   1250 mG (OsCal) 1 Tablet(s) Oral two times a day  caspofungin IVPB      caspofungin IVPB 50 milliGRAM(s) IV Intermittent every 24 hours  chlorhexidine 2% Cloths 1 Application(s) Topical daily  chlorhexidine 2% Cloths 1 Application(s) Topical daily  enoxaparin Injectable 50 milliGRAM(s) SubCutaneous every 12 hours  gabapentin 300 milliGRAM(s) Oral every 12 hours  levETIRAcetam  IVPB 500 milliGRAM(s) IV Intermittent two times a day  melatonin 3 milliGRAM(s) Oral at bedtime  meropenem  IVPB 1000 milliGRAM(s) IV Intermittent every 8 hours  meropenem  IVPB      midodrine. 20 milliGRAM(s) Oral three times a day  pantoprazole  Injectable 40 milliGRAM(s) IV Push every 24 hours  polyethylene glycol 3350 17 Gram(s) Oral at bedtime  raloxifene 60 milliGRAM(s) Oral daily  saccharomyces boulardii 250 milliGRAM(s) Oral two times a day  senna 2 Tablet(s) Oral at bedtime  sodium chloride 0.65% Nasal 2 Spray(s) Both Nostrils three times a day  sodium chloride 0.9% Bolus 500 milliLiter(s) IV Bolus once    MEDICATIONS  (PRN):  aluminum hydroxide/magnesium hydroxide/simethicone Suspension 30 milliLiter(s) Oral every 4 hours PRN Dyspepsia  ondansetron Injectable 4 milliGRAM(s) IV Push every 8 hours PRN Nausea and/or Vomiting

## 2024-03-01 NOTE — PROGRESS NOTE ADULT - ASSESSMENT
58F w/ PMHx Down Syndrome, nonverbal at baseline, Hypothyroidism, Cerebral palsy and Seizure Disorders presents to the ED from nursing facility/group home ([facility]) presented to ED for respiratory distress and high grade fever. Patient found to be septic on admission. Admitted to SDU for management of acute respiratory distress 2/2 CAP/Aspiration PNA (20 Jan 2024 18:22)  While pt was on the floor she developed dyspnea after swallow evaluation, was spiking high grade fever, consulted by pulmonary/critical care and was upgraded back to SDU.      A/P  # Sepsis POA / Acute hypoxic resp failure / RSV bronchiolitis /  H/o Dysphagia/ suspected aspiration    - c/w  high flow oxygen now, taper off as tolerated   - off pressors now   - continue midodrine 20 Q8H  - continue caspo, danna until 3/10 per ID   - Failed FEES,  NG tube for feedings and medications, patient will need PEG tube once 02 requirements decrease  - c/w duoneb, chest PT, aspiration precautions   - pulmonary is following  - aggressive chest PT with vest, aspiration precautions and suction       Elevated Troponin , type 2 MI/  Sinus tachycardia  - c/w telemetry monitoring   - Repeat EKG: Normal sinus rhythm  - c/w  metoprolol  - TTE 2/19 normal EF no DD or valve abnormalities    # Seizure Disorder  - c/w  Keppra   - seizure precautions  - keep Mg above 2.0     # H/o lower ext DVT   - c/w therapeutic Lovenox, Eliquis on dc    #  Hypothyroidism  - TSH 0.51    # Normocytic Anemia, anemia of chronic disease   - monitor H/H, keep Hb above 7.5     # Down Syndrome/  Cerebral Palsy/ functional quadriplegia   - supportive care  - prevent falls and aspiration   - failed speech and swallow, needs PEG as above  - on Raloxifene     Palliative followed up ,  pt is [facility], paperwork singed on 2/29  by palliative care attending and myself, DNR/DNI was approved by [facility], will c/w caspofungin and Abx to complete the course, if there is no improvement will change level of care to CMO ( change in level of care was approved by [employer], paperwork in the paper chart)

## 2024-03-01 NOTE — PROGRESS NOTE ADULT - ASSESSMENT
ASSESSMENT  58F w/ PMHx Down Syndrome, nonverbal at baseline, Hypothyroidism, Cerebral palsy and Seizure Disorders presents to the ED from nursing facility/group home presented to ED for respiratory distress and high grade fever.     IMPRESSION  #Fevers, resolving , HAP / aspiration with cavitary PNA  Doubt TB, admission CXR without findings   < from: CT Chest w/ IV Cont (02.21.24 @ 00:09) >  Bilateral pneumonia, left worse than right, with a left upper lobe   thick-walled cavity. Diffuse opacification of the   left lung is seen with what appears to be some mucus plugging centrally   within the left mainstem bronchus. There is a pleural effusion.   Reactive mediastinal   lymph nodes are seen.    MRSA PCR Result.: Negative (02-20-24 @ 13:42)    Procalcitonin, Serum: 0.16 (02-19-24 @ 16:00)- unremarkable     2/17 BCX NGTD     Rapid RVP Result: NotDetec (02-17-24 @ 19:06)    2/12 BCX NGTD    Procalcitonin, Serum: 0.10 (02-12-24 @ 11:17)    Rapid RVP Result: NotDetec (02-12-24 @ 10:28)    MRSA PCR Result.: Negative (02-12-24 @ 10:28)    2/9 BCX NGTD x2    2/3 BCX NGTD     2/1 BCX NGTD     2/1 UCX   >=3 organisms. Probable collection contamination.    1/31 BCX NG    Rapid RVP Result: NotDetec (02-04-24 @ 10:28)    MRSA PCR Result.: Negative (02-03-24 @ 21:32)     UA without significant pyuria   Procalcitonin, Serum: 0.22 (02-01-24 @ 16:00)--> Procalcitonin, Serum: 0.15 (02-03-24 @ 11:13), downtrending ; unremarkable   MRSA PCR Result.: Negative (01-22-24 @ 05:30)  < from: Xray Chest 1 View-PORTABLE IMMEDIATE (Xray Chest 1 View-PORTABLE IMMEDIATE .) (02.01.24 @ 03:02) >  Bibasal opacities without significant change.    quantiferon gold negative  #Acute hypoxic respiratory failure- Gram Negative pneumonia   #1/20 BCX 1/4 bottles : Staphylococcus hominis- contaminant   Repeat CX NG   #Severe Sepsis on admission  #RSV + 1/21  #Elevated fungitell   serum aspergillus galactomannan and histo are (-)  Fungitell: 76 (02-19-24 @ 16:00)  Fungitell: 63 (02-06-24 @ 11:27)  Fungitell: 325 (01-20-24 @ 21:23)  Fungitell: 138 (11-07-23 @ 08:08)  Fungitell: 115 (11-02-23 @ 11:40)  Fungitell: >500 pg/mL (10.17.23 @ 17:20) s/p empiric caspo   #Full thickness ulcer right heel- Appreciate burn/podiatry evaluation.  #History of Right planter foot ulcers - two full thickness ulcers - serous drainage with mild erythema with OM  - MR Foot No Cont, Right (10.16.23 @ 21:51): IMPRESSION: 1.  Limited exam. 2.  Osteomyelitis of the first metatarsal stump. 3.  Osteomyelitis of the second toe distal phalanx.  - s/p excidional debridement to and including bone 1st metatarsal with partial 2nd digit amputation - 1st metatarsal head resected - Wound Cx Proteus ESBL   #CKD 2-3 Creatinine: 0.7 mg/dL (02.02.24 @ 04:59)      #History of buttock ulcer  #Down syndrome/Cerebral Palsy     RECOMMENDATIONS  - Continue Meropenem for suspected lung abscess   (1/21-1/27), 2/1-2/16, restarted 2/18 , will need 4-6 week course with repeat CT [facility] imaging to determine final course   - Fungitell started downtrending 1 day after Caspo started, doubt related, but at this point given critical illness would plan on completing a course  - Continue caspofungin 50mg q24h IV , hx unclear elevated fungitell. No clear source ,No risk factors for PCP or other invasive fungal infection . Continuing for now  - Tentative end date 3/10 for danna/caspo  - Grave prognosis, GOC , aggressive care is futile    If any questions, please send a message or call on [employer] Teams  Please continue to update ID with any pertinent new laboratory or radiographic findings.

## 2024-03-01 NOTE — PROGRESS NOTE ADULT - ASSESSMENT
IMPRESSION:    Acute hypoxemic respiratory failure on HHFNC  PNA  Sepsis POA  Anemia  Septic shock off Levophed   Recurrent aspiration pneumonia / prior intubation  HO DVT on Eliquis   HO GI bleed  HO OM  HO recent duodenal perforation   HO polymicrobial bacteremia   H/o CP, DS  H/o seizures    PLAN:    CNS:  Avoid CNS Depressant, AED. MS at baseline.    HEENT: Oral care.     PULMONARY: HOB at 45 degrees.  Aspiration precaution. Wean FiO2 Keep spo2 92 TO 96%.     CARDIOVASCULAR: Keep MAP more than 60. Avoid overload. C/w midodrine     GI: Protonix. NG feeding.  will need PEG when more stable    INFECTIOUS DISEASE:  ABX and Anti fungal per ID.     HEMATOLOGICAL:  Lovenox therapeutic.  Monitor CBC    ENDOCRINE:  Follow up FS.  Insulin protocol if needed.    MUSCULOSKELETAL: Bedrest.  Off loading.  Wound care.    Prognosis very poor.    Palliative care following    dnr/i

## 2024-03-02 LAB
GLUCOSE BLDC GLUCOMTR-MCNC: 104 MG/DL — HIGH (ref 70–99)
GLUCOSE BLDC GLUCOMTR-MCNC: 135 MG/DL — HIGH (ref 70–99)
GLUCOSE BLDC GLUCOMTR-MCNC: 165 MG/DL — HIGH (ref 70–99)

## 2024-03-02 PROCEDURE — 99233 SBSQ HOSP IP/OBS HIGH 50: CPT

## 2024-03-02 PROCEDURE — 71045 X-RAY EXAM CHEST 1 VIEW: CPT | Mod: 26

## 2024-03-02 RX ADMIN — Medication 1 TABLET(S): at 05:34

## 2024-03-02 RX ADMIN — Medication 4 MILLILITER(S): at 13:48

## 2024-03-02 RX ADMIN — ENOXAPARIN SODIUM 50 MILLIGRAM(S): 100 INJECTION SUBCUTANEOUS at 17:29

## 2024-03-02 RX ADMIN — PANTOPRAZOLE SODIUM 40 MILLIGRAM(S): 20 TABLET, DELAYED RELEASE ORAL at 05:26

## 2024-03-02 RX ADMIN — Medication 1 APPLICATION(S): at 05:42

## 2024-03-02 RX ADMIN — Medication 3 MILLILITER(S): at 09:22

## 2024-03-02 RX ADMIN — GABAPENTIN 300 MILLIGRAM(S): 400 CAPSULE ORAL at 05:34

## 2024-03-02 RX ADMIN — Medication 3 MILLILITER(S): at 01:38

## 2024-03-02 RX ADMIN — Medication 2 SPRAY(S): at 05:35

## 2024-03-02 RX ADMIN — CASPOFUNGIN ACETATE 260 MILLIGRAM(S): 7 INJECTION, POWDER, LYOPHILIZED, FOR SOLUTION INTRAVENOUS at 12:22

## 2024-03-02 RX ADMIN — Medication 2 SPRAY(S): at 13:15

## 2024-03-02 RX ADMIN — MEROPENEM 100 MILLIGRAM(S): 1 INJECTION INTRAVENOUS at 05:29

## 2024-03-02 RX ADMIN — RALOXIFENE HYDROCHLORIDE 60 MILLIGRAM(S): 60 TABLET, COATED ORAL at 12:24

## 2024-03-02 RX ADMIN — MIDODRINE HYDROCHLORIDE 20 MILLIGRAM(S): 2.5 TABLET ORAL at 17:30

## 2024-03-02 RX ADMIN — MEROPENEM 100 MILLIGRAM(S): 1 INJECTION INTRAVENOUS at 21:21

## 2024-03-02 RX ADMIN — Medication 1 TABLET(S): at 17:31

## 2024-03-02 RX ADMIN — MIDODRINE HYDROCHLORIDE 20 MILLIGRAM(S): 2.5 TABLET ORAL at 12:24

## 2024-03-02 RX ADMIN — CHLORHEXIDINE GLUCONATE 1 APPLICATION(S): 213 SOLUTION TOPICAL at 12:25

## 2024-03-02 RX ADMIN — Medication 250 MILLIGRAM(S): at 05:34

## 2024-03-02 RX ADMIN — LEVETIRACETAM 400 MILLIGRAM(S): 250 TABLET, FILM COATED ORAL at 05:37

## 2024-03-02 RX ADMIN — LEVETIRACETAM 400 MILLIGRAM(S): 250 TABLET, FILM COATED ORAL at 17:33

## 2024-03-02 RX ADMIN — ENOXAPARIN SODIUM 50 MILLIGRAM(S): 100 INJECTION SUBCUTANEOUS at 05:28

## 2024-03-02 RX ADMIN — Medication 1 DROP(S): at 05:34

## 2024-03-02 RX ADMIN — Medication 4 MILLILITER(S): at 01:38

## 2024-03-02 RX ADMIN — GABAPENTIN 300 MILLIGRAM(S): 400 CAPSULE ORAL at 17:29

## 2024-03-02 RX ADMIN — MIDODRINE HYDROCHLORIDE 20 MILLIGRAM(S): 2.5 TABLET ORAL at 05:33

## 2024-03-02 RX ADMIN — Medication 3 MILLIGRAM(S): at 21:22

## 2024-03-02 RX ADMIN — Medication 3 MILLILITER(S): at 13:40

## 2024-03-02 RX ADMIN — Medication 3 MILLILITER(S): at 20:44

## 2024-03-02 RX ADMIN — MEROPENEM 100 MILLIGRAM(S): 1 INJECTION INTRAVENOUS at 13:15

## 2024-03-02 RX ADMIN — Medication 4 MILLILITER(S): at 09:24

## 2024-03-02 RX ADMIN — Medication 1 APPLICATION(S): at 17:33

## 2024-03-02 RX ADMIN — Medication 4 MILLILITER(S): at 21:19

## 2024-03-02 RX ADMIN — Medication 250 MILLIGRAM(S): at 17:28

## 2024-03-02 NOTE — PROGRESS NOTE ADULT - SUBJECTIVE AND OBJECTIVE BOX
58F w/ PMHx Down Syndrome, nonverbal at baseline, Hypothyroidism, Cerebral palsy and Seizure Disorders presents to the ED from nursing facility/group home ([facility]) presented to ED for respiratory distress and high grade fever. Patient found to be septic on admission.Admitted to SDU for management of acute respiratory distress 2/2 CAP/Aspiration PNA (20 Jan 2024 18:22)  While pt was on the floor she developed dyspnea after swallow evaluation, was spiking high grade fever, consulted by pulmonary/critical care and was upgraded back to SDU.  Pt developed left lung white out, improved after aggressive chest PT, treated with Meropenem and caspofungin, ID is following, her overall prognosis is very poor, she might need trach and PEG in the nearest future , a meeting with facility staff and state took place on 2/14.   Pt completed course o Meropenem, Levofloxacin added on 2/15 ( pt spiked fever), Meropenem resumed on 2/18,  she was tapered off pressure support, requires  high flow oxygen fo a while in SDU  Hb dropped without acute blood loss, will  monitor closely ( pt is o full AC). Now pt is off pressure support.   Today pt is awake, comfortable.     PAST MEDICAL & SURGICAL HISTORY:  Down syndrome  Osteoporosis  Mild anemia  Neuropathy  S/P debridement  of R hip on 3/2/21      Vital Signs:  T(C): 36.1 (02 Mar 2024 07:37), Max: 36.9 (02 Mar 2024 00:49)  T(F): 97 (02 Mar 2024 07:37), Max: 98.5 (02 Mar 2024 00:49)  HR: 61 (02 Mar 2024 07:37) (61 - 91)  BP: 110/58 (02 Mar 2024 07:37) (79/53 - 112/60)  BP(mean): 65 (02 Mar 2024 04:00) (62 - 65)  RR: 18 (02 Mar 2024 10:11) (18 - 20)  SpO2: 97% (02 Mar 2024 10:11) (92% - 99%)    Parameters below as of 02 Mar 2024 10:11  Patient On (Oxygen Delivery Method): nasal cannula, high flow  O2 Flow (L/min): 40  O2 Concentration (%): 60      PHYSICAL EXAM:  GENERAL:  NAD chronically ill appearing, mentally challenged   SKIN: No rashes or lesions  HEENT: Atraumatic. Normocephalic   NECK: Supple, No JVD.    PULMONARY: decreased breath sounds B/L, poor air entry   CVS: Normal S1, S2. Rate and Rhythm are regular.    ABDOMEN/GI: Soft, Nontender, Nondistended.  MSK:  contracted LE   NEUROLOGIC: does not follow commands, opens eyes spontaneously   PSYCH: Alert & oriented x 0    Consultant(s) Notes Reviewed:  [x ] YES  [ ] NO  Care Discussed with Consultants/Other Providers [ x] YES  [ ] NO    LABS:           no new labs                         9.5    10.68 )-----------( 544      ( 01 Mar 2024 06:25 )             32.2   03-01    135  |  95<L>  |  10  ----------------------------<  159<H>  4.9   |  27  |  0.5<L>    Ca    8.9      01 Mar 2024 06:25  Mg     2.5     03-01    TPro  6.8  /  Alb  2.7<L>  /  TBili  <0.2  /  DBili  x   /  AST  18  /  ALT  11  /  AlkPhos  105  03-01    Culture - Blood (collected 25 Feb 2024 11:47)  Source: .Blood None  Preliminary Report (26 Feb 2024 20:02):    No growth at 24 hours    RADIOLOGY & ADDITIONAL TESTS:  Imaging or report Personally Reviewed:  [x] YES  [ ] NO  EKG reviewed: [x] YES  [ ] NO    < from: Xray Chest 1 View- PORTABLE-Routine (02.28.24 @ 05:32) >    Impression:    Bilateral opacifications, left greater than right, stable. Support   devices as described.    < end of copied text >  < from: CT Chest w/ IV Cont (02.21.24 @ 00:09) >  Impression:    Bilateral pneumonia, left worse than right, with a left upper lobe   thick-walled cavity.    Enteric catheter as described. Note that by the time that this report was   dictated, this issue had been tended to..    < end of copied text >  < from: CT Abdomen and Pelvis w/ IV Cont (02.21.24 @ 00:10) >    IMPRESSION:  1.  No CT evidence of an acuteabdominopelvic pathology.    See separately dictated CT chest report for intrathoracic findings.    < end of copied text >  < from: Xray Chest 1 View- PORTABLE-Routine (03.01.24 @ 06:58) >  impression:    EKG leads overlie thorax, obscuring anatomy.    Support devices: NG tube coursing below the left hemidiaphragm. The tip   is not included..    Cardiac/ Mediastinum: Stable    Lungs/ Pleura: Unchanged left greater than right opacities. No   pneumothorax    Skeletal/ soft tissues: Stable    < end of copied text >    MEDICATIONS  (STANDING):  acetylcysteine 20%  Inhalation 4 milliLiter(s) Inhalation every 6 hours  albuterol/ipratropium for Nebulization 3 milliLiter(s) Nebulizer every 6 hours  AQUAPHOR (petrolatum Ointment) 1 Application(s) Topical two times a day  artificial  tears Solution 1 Drop(s) Both EYES two times a day  calcium carbonate   1250 mG (OsCal) 1 Tablet(s) Oral two times a day  caspofungin IVPB      caspofungin IVPB 50 milliGRAM(s) IV Intermittent every 24 hours  chlorhexidine 2% Cloths 1 Application(s) Topical daily  chlorhexidine 2% Cloths 1 Application(s) Topical daily  enoxaparin Injectable 50 milliGRAM(s) SubCutaneous every 12 hours  gabapentin 300 milliGRAM(s) Oral every 12 hours  levETIRAcetam  IVPB 500 milliGRAM(s) IV Intermittent two times a day  melatonin 3 milliGRAM(s) Oral at bedtime  meropenem  IVPB 1000 milliGRAM(s) IV Intermittent every 8 hours  meropenem  IVPB      midodrine. 20 milliGRAM(s) Oral three times a day  pantoprazole  Injectable 40 milliGRAM(s) IV Push every 24 hours  polyethylene glycol 3350 17 Gram(s) Oral at bedtime  raloxifene 60 milliGRAM(s) Oral daily  saccharomyces boulardii 250 milliGRAM(s) Oral two times a day  senna 2 Tablet(s) Oral at bedtime  sodium chloride 0.65% Nasal 2 Spray(s) Both Nostrils three times a day  sodium chloride 0.9% Bolus 500 milliLiter(s) IV Bolus once    MEDICATIONS  (PRN):  aluminum hydroxide/magnesium hydroxide/simethicone Suspension 30 milliLiter(s) Oral every 4 hours PRN Dyspepsia  ondansetron Injectable 4 milliGRAM(s) IV Push every 8 hours PRN Nausea and/or Vomiting

## 2024-03-02 NOTE — PROGRESS NOTE ADULT - SUBJECTIVE AND OBJECTIVE BOX
Over Night Events: events noted, on  HHFNC, afebrile, ID/ palliative noted    PHYSICAL EXAM    ICU Vital Signs Last 24 Hrs  T(C): 36.1 (02 Mar 2024 07:37), Max: 36.9 (02 Mar 2024 00:49)  T(F): 97 (02 Mar 2024 07:37), Max: 98.5 (02 Mar 2024 00:49)  HR: 61 (02 Mar 2024 07:37) (61 - 91)  BP: 110/58 (02 Mar 2024 07:37) (79/53 - 112/60)  BP(mean): 65 (02 Mar 2024 04:00) (62 - 65)  RR: 20 (02 Mar 2024 07:37) (20 - 20)  SpO2: 94% (02 Mar 2024 07:37) (92% - 99%)    O2 Parameters below as of 02 Mar 2024 04:45  Patient On (Oxygen Delivery Method): nasal cannula, high flow  O2 Flow (L/min): 40  O2 Concentration (%): 100        General: ill looking  Lungs: Bilateral rhonchi  Cardiovascular: Regular   Abdomen: Soft, Positive BS  Extremities: No clubbing   Not following commands      03-01-24 @ 07:01  -  03-02-24 @ 07:00  --------------------------------------------------------  IN:    Enteral Tube Flush: 450 mL    Jevity 1.2: 900 mL  Total IN: 1350 mL    OUT:  Total OUT: 0 mL    Total NET: 1350 mL          LABS:                          9.5    10.68 )-----------( 544      ( 01 Mar 2024 06:25 )             32.2                                               03-01    135  |  95<L>  |  10  ----------------------------<  159<H>  4.9   |  27  |  0.5<L>    Ca    8.9      01 Mar 2024 06:25  Mg     2.5     03-01    TPro  6.8  /  Alb  2.7<L>  /  TBili  <0.2  /  DBili  x   /  AST  18  /  ALT  11  /  AlkPhos  105  03-01                                             Urinalysis Basic - ( 01 Mar 2024 06:25 )    Color: x / Appearance: x / SG: x / pH: x  Gluc: 159 mg/dL / Ketone: x  / Bili: x / Urobili: x   Blood: x / Protein: x / Nitrite: x   Leuk Esterase: x / RBC: x / WBC x   Sq Epi: x / Non Sq Epi: x / Bacteria: x                                                  LIVER FUNCTIONS - ( 01 Mar 2024 06:25 )  Alb: 2.7 g/dL / Pro: 6.8 g/dL / ALK PHOS: 105 U/L / ALT: 11 U/L / AST: 18 U/L / GGT: x                                                                                                                                       MEDICATIONS  (STANDING):  acetylcysteine 20%  Inhalation 4 milliLiter(s) Inhalation every 6 hours  albuterol/ipratropium for Nebulization 3 milliLiter(s) Nebulizer every 6 hours  AQUAPHOR (petrolatum Ointment) 1 Application(s) Topical two times a day  artificial  tears Solution 1 Drop(s) Both EYES two times a day  calcium carbonate   1250 mG (OsCal) 1 Tablet(s) Oral two times a day  caspofungin IVPB      caspofungin IVPB 50 milliGRAM(s) IV Intermittent every 24 hours  chlorhexidine 2% Cloths 1 Application(s) Topical daily  chlorhexidine 2% Cloths 1 Application(s) Topical daily  enoxaparin Injectable 50 milliGRAM(s) SubCutaneous every 12 hours  gabapentin 300 milliGRAM(s) Oral every 12 hours  levETIRAcetam  IVPB 500 milliGRAM(s) IV Intermittent two times a day  melatonin 3 milliGRAM(s) Oral at bedtime  meropenem  IVPB      meropenem  IVPB 1000 milliGRAM(s) IV Intermittent every 8 hours  midodrine. 20 milliGRAM(s) Oral three times a day  pantoprazole  Injectable 40 milliGRAM(s) IV Push every 24 hours  polyethylene glycol 3350 17 Gram(s) Oral at bedtime  raloxifene 60 milliGRAM(s) Oral daily  saccharomyces boulardii 250 milliGRAM(s) Oral two times a day  senna 2 Tablet(s) Oral at bedtime  sodium chloride 0.65% Nasal 2 Spray(s) Both Nostrils three times a day  sodium chloride 0.9% Bolus 500 milliLiter(s) IV Bolus once    MEDICATIONS  (PRN):  aluminum hydroxide/magnesium hydroxide/simethicone Suspension 30 milliLiter(s) Oral every 4 hours PRN Dyspepsia  ondansetron Injectable 4 milliGRAM(s) IV Push every 8 hours PRN Nausea and/or Vomiting

## 2024-03-02 NOTE — PROGRESS NOTE ADULT - ASSESSMENT
58F w/ PMHx Down Syndrome, nonverbal at baseline, Hypothyroidism, Cerebral palsy and Seizure Disorders presents to the ED from [facility] presented to ED for respiratory distress and high grade fever. Patient found to be septic on admission. Admitted to SDU for management of acute respiratory distress 2/2 CAP/Aspiration PNA (20 Jan 2024 18:22)  While pt was on the floor she developed dyspnea after swallow evaluation, was spiking high grade fever, consulted by pulmonary/critical care and was upgraded back to SDU.      A/P  # Sepsis POA / Acute hypoxic resp failure / RSV bronchiolitis /  H/o Dysphagia/ suspected aspiration    - c/w  high flow oxygen now ( using less percentage of oxygen now)   - off pressors now   - continue midodrine 20 Q8H  - continue caspo, danna until 3/10 per ID   - Failed FEES,  NG tube for feedings and medications, patient will need PEG tube once 02 requirements decrease  - c/w duoneb, chest PT, aspiration precautions   - pulmonary is following  - aggressive chest PT with vest, aspiration precautions and suction       Elevated Troponin , type 2 MI/  Sinus tachycardia  - c/w telemetry monitoring   - Repeat EKG: Normal sinus rhythm  - c/w  metoprolol  - TTE 2/19 normal EF no DD or valve abnormalities    # Seizure Disorder  - c/w  Keppra   - seizure precautions  - keep Mg above 2.0     # H/o lower ext DVT   - c/w therapeutic Lovenox, Eliquis on dc    #  Hypothyroidism  - TSH 0.51    # Normocytic Anemia, anemia of chronic disease   - monitor H/H, keep Hb above 7.5     # Down Syndrome/  Cerebral Palsy/ functional quadriplegia   - supportive care  - prevent falls and aspiration   - failed speech and swallow, needs PEG as above  - on Raloxifene     Palliative followed up ,  pt is [facility], paperwork singed on 2/29  by palliative care attending and myself, DNR/DNI was approved by [facility], will c/w caspofungin and Abx to complete the course, if there is no improvement will change level of care to CMO ( change in level of care was approved by [employer] [place] [employer], paperwork in the paper chart)

## 2024-03-02 NOTE — PROGRESS NOTE ADULT - ASSESSMENT
IMPRESSION:    Acute hypoxemic respiratory failure on HHFNC  PNA  Sepsis POA  Anemia  Septic shock off Levophed   Recurrent aspiration pneumonia / prior intubation  HO DVT on Eliquis   HO GI bleed  HO OM  HO recent duodenal perforation   HO polymicrobial bacteremia   H/o CP, DS  H/o seizures    PLAN:    CNS:  Avoid CNS Depressant, AED. MS at baseline.    HEENT: Oral care.     PULMONARY: HOB at 45 degrees.  Aspiration precaution. Wean FiO2 Keep spo2 92 TO 96%.     CARDIOVASCULAR: Keep MAP more than 60. Avoid overload. C/w midodrine     GI: Protonix. NG feeding.     INFECTIOUS DISEASE:  ABX and Anti fungal per ID.     HEMATOLOGICAL:  Lovenox therapeutic.  Monitor CBC    ENDOCRINE:  Follow up FS.  Insulin protocol if needed.    MUSCULOSKELETAL: Bedrest.  Off loading.  Wound care.    Prognosis very poor.    Palliative care following    dnr/i

## 2024-03-03 LAB
ALBUMIN SERPL ELPH-MCNC: 2.8 G/DL — LOW (ref 3.5–5.2)
ALP SERPL-CCNC: 90 U/L — SIGNIFICANT CHANGE UP (ref 30–115)
ALT FLD-CCNC: 11 U/L — SIGNIFICANT CHANGE UP (ref 0–41)
ANION GAP SERPL CALC-SCNC: 8 MMOL/L — SIGNIFICANT CHANGE UP (ref 7–14)
AST SERPL-CCNC: 16 U/L — SIGNIFICANT CHANGE UP (ref 0–41)
BASOPHILS # BLD AUTO: 0.08 K/UL — SIGNIFICANT CHANGE UP (ref 0–0.2)
BASOPHILS NFR BLD AUTO: 1.3 % — HIGH (ref 0–1)
BILIRUB SERPL-MCNC: <0.2 MG/DL — SIGNIFICANT CHANGE UP (ref 0.2–1.2)
BUN SERPL-MCNC: 12 MG/DL — SIGNIFICANT CHANGE UP (ref 10–20)
CALCIUM SERPL-MCNC: 9.1 MG/DL — SIGNIFICANT CHANGE UP (ref 8.4–10.5)
CHLORIDE SERPL-SCNC: 98 MMOL/L — SIGNIFICANT CHANGE UP (ref 98–110)
CO2 SERPL-SCNC: 32 MMOL/L — SIGNIFICANT CHANGE UP (ref 17–32)
CREAT SERPL-MCNC: 0.5 MG/DL — LOW (ref 0.7–1.5)
EGFR: 109 ML/MIN/1.73M2 — SIGNIFICANT CHANGE UP
EOSINOPHIL # BLD AUTO: 0.23 K/UL — SIGNIFICANT CHANGE UP (ref 0–0.7)
EOSINOPHIL NFR BLD AUTO: 3.8 % — SIGNIFICANT CHANGE UP (ref 0–8)
GLUCOSE BLDC GLUCOMTR-MCNC: 89 MG/DL — SIGNIFICANT CHANGE UP (ref 70–99)
GLUCOSE SERPL-MCNC: 103 MG/DL — HIGH (ref 70–99)
HCT VFR BLD CALC: 27.7 % — LOW (ref 37–47)
HGB BLD-MCNC: 8.3 G/DL — LOW (ref 12–16)
IMM GRANULOCYTES NFR BLD AUTO: 0.3 % — SIGNIFICANT CHANGE UP (ref 0.1–0.3)
LYMPHOCYTES # BLD AUTO: 2.39 K/UL — SIGNIFICANT CHANGE UP (ref 1.2–3.4)
LYMPHOCYTES # BLD AUTO: 39.4 % — SIGNIFICANT CHANGE UP (ref 20.5–51.1)
MAGNESIUM SERPL-MCNC: 2.4 MG/DL — SIGNIFICANT CHANGE UP (ref 1.8–2.4)
MCHC RBC-ENTMCNC: 23.3 PG — LOW (ref 27–31)
MCHC RBC-ENTMCNC: 30 G/DL — LOW (ref 32–37)
MCV RBC AUTO: 77.8 FL — LOW (ref 81–99)
MONOCYTES # BLD AUTO: 0.45 K/UL — SIGNIFICANT CHANGE UP (ref 0.1–0.6)
MONOCYTES NFR BLD AUTO: 7.4 % — SIGNIFICANT CHANGE UP (ref 1.7–9.3)
NEUTROPHILS # BLD AUTO: 2.89 K/UL — SIGNIFICANT CHANGE UP (ref 1.4–6.5)
NEUTROPHILS NFR BLD AUTO: 47.8 % — SIGNIFICANT CHANGE UP (ref 42.2–75.2)
NRBC # BLD: 0 /100 WBCS — SIGNIFICANT CHANGE UP (ref 0–0)
PLATELET # BLD AUTO: 554 K/UL — HIGH (ref 130–400)
PMV BLD: 9.6 FL — SIGNIFICANT CHANGE UP (ref 7.4–10.4)
POTASSIUM SERPL-MCNC: 4.4 MMOL/L — SIGNIFICANT CHANGE UP (ref 3.5–5)
POTASSIUM SERPL-SCNC: 4.4 MMOL/L — SIGNIFICANT CHANGE UP (ref 3.5–5)
PROT SERPL-MCNC: 6.7 G/DL — SIGNIFICANT CHANGE UP (ref 6–8)
RBC # BLD: 3.56 M/UL — LOW (ref 4.2–5.4)
RBC # FLD: 21 % — HIGH (ref 11.5–14.5)
SODIUM SERPL-SCNC: 138 MMOL/L — SIGNIFICANT CHANGE UP (ref 135–146)
WBC # BLD: 6.06 K/UL — SIGNIFICANT CHANGE UP (ref 4.8–10.8)
WBC # FLD AUTO: 6.06 K/UL — SIGNIFICANT CHANGE UP (ref 4.8–10.8)

## 2024-03-03 PROCEDURE — 99232 SBSQ HOSP IP/OBS MODERATE 35: CPT

## 2024-03-03 PROCEDURE — 99233 SBSQ HOSP IP/OBS HIGH 50: CPT

## 2024-03-03 PROCEDURE — 71045 X-RAY EXAM CHEST 1 VIEW: CPT | Mod: 26

## 2024-03-03 RX ORDER — SODIUM CHLORIDE 9 MG/ML
500 INJECTION, SOLUTION INTRAVENOUS ONCE
Refills: 0 | Status: COMPLETED | OUTPATIENT
Start: 2024-03-03 | End: 2024-03-03

## 2024-03-03 RX ADMIN — MIDODRINE HYDROCHLORIDE 20 MILLIGRAM(S): 2.5 TABLET ORAL at 05:52

## 2024-03-03 RX ADMIN — Medication 3 MILLILITER(S): at 19:34

## 2024-03-03 RX ADMIN — Medication 2 SPRAY(S): at 21:22

## 2024-03-03 RX ADMIN — Medication 250 MILLIGRAM(S): at 06:02

## 2024-03-03 RX ADMIN — LEVETIRACETAM 400 MILLIGRAM(S): 250 TABLET, FILM COATED ORAL at 06:02

## 2024-03-03 RX ADMIN — CASPOFUNGIN ACETATE 260 MILLIGRAM(S): 7 INJECTION, POWDER, LYOPHILIZED, FOR SOLUTION INTRAVENOUS at 13:08

## 2024-03-03 RX ADMIN — MIDODRINE HYDROCHLORIDE 20 MILLIGRAM(S): 2.5 TABLET ORAL at 18:06

## 2024-03-03 RX ADMIN — ENOXAPARIN SODIUM 50 MILLIGRAM(S): 100 INJECTION SUBCUTANEOUS at 18:08

## 2024-03-03 RX ADMIN — Medication 4 MILLILITER(S): at 13:51

## 2024-03-03 RX ADMIN — Medication 3 MILLILITER(S): at 13:50

## 2024-03-03 RX ADMIN — SODIUM CHLORIDE 1500 MILLILITER(S): 9 INJECTION, SOLUTION INTRAVENOUS at 18:31

## 2024-03-03 RX ADMIN — Medication 2 SPRAY(S): at 05:52

## 2024-03-03 RX ADMIN — PANTOPRAZOLE SODIUM 40 MILLIGRAM(S): 20 TABLET, DELAYED RELEASE ORAL at 06:01

## 2024-03-03 RX ADMIN — Medication 4 MILLILITER(S): at 20:04

## 2024-03-03 RX ADMIN — Medication 250 MILLIGRAM(S): at 18:09

## 2024-03-03 RX ADMIN — MIDODRINE HYDROCHLORIDE 20 MILLIGRAM(S): 2.5 TABLET ORAL at 11:48

## 2024-03-03 RX ADMIN — Medication 2 SPRAY(S): at 05:59

## 2024-03-03 RX ADMIN — GABAPENTIN 300 MILLIGRAM(S): 400 CAPSULE ORAL at 05:52

## 2024-03-03 RX ADMIN — Medication 1 TABLET(S): at 05:52

## 2024-03-03 RX ADMIN — RALOXIFENE HYDROCHLORIDE 60 MILLIGRAM(S): 60 TABLET, COATED ORAL at 11:49

## 2024-03-03 RX ADMIN — GABAPENTIN 300 MILLIGRAM(S): 400 CAPSULE ORAL at 18:07

## 2024-03-03 RX ADMIN — Medication 4 MILLILITER(S): at 09:04

## 2024-03-03 RX ADMIN — Medication 1 APPLICATION(S): at 18:08

## 2024-03-03 RX ADMIN — Medication 3 MILLILITER(S): at 09:03

## 2024-03-03 RX ADMIN — ENOXAPARIN SODIUM 50 MILLIGRAM(S): 100 INJECTION SUBCUTANEOUS at 06:02

## 2024-03-03 RX ADMIN — MEROPENEM 100 MILLIGRAM(S): 1 INJECTION INTRAVENOUS at 06:01

## 2024-03-03 RX ADMIN — Medication 1 DROP(S): at 18:19

## 2024-03-03 RX ADMIN — LEVETIRACETAM 400 MILLIGRAM(S): 250 TABLET, FILM COATED ORAL at 18:09

## 2024-03-03 RX ADMIN — Medication 2 SPRAY(S): at 13:41

## 2024-03-03 RX ADMIN — MEROPENEM 100 MILLIGRAM(S): 1 INJECTION INTRAVENOUS at 13:43

## 2024-03-03 RX ADMIN — Medication 1 TABLET(S): at 18:07

## 2024-03-03 RX ADMIN — CHLORHEXIDINE GLUCONATE 1 APPLICATION(S): 213 SOLUTION TOPICAL at 11:48

## 2024-03-03 RX ADMIN — CHLORHEXIDINE GLUCONATE 1 APPLICATION(S): 213 SOLUTION TOPICAL at 11:49

## 2024-03-03 NOTE — PROGRESS NOTE ADULT - SUBJECTIVE AND OBJECTIVE BOX
Over Night Events: events noted, on NRM/ HHFNC, afebrile    PHYSICAL EXAM    ICU Vital Signs Last 24 Hrs  T(C): 36.3 (03 Mar 2024 04:00), Max: 36.6 (02 Mar 2024 21:00)  T(F): 97.4 (03 Mar 2024 04:00), Max: 97.8 (02 Mar 2024 21:00)  HR: 74 (03 Mar 2024 04:00) (59 - 75)  BP: 100/63 (03 Mar 2024 04:00) (100/63 - 120/56)  RR: 18 (03 Mar 2024 04:00) (18 - 18)  SpO2: 96% (03 Mar 2024 04:00) (95% - 98%)    O2 Parameters below as of 02 Mar 2024 21:00  Patient On (Oxygen Delivery Method): nasal cannula, high flow            General: ill looking  Lungs: dec bs l side  Cardiovascular: Regular   Abdomen: Soft, Positive BS  Extremities: No clubbing   not following commands      03-02-24 @ 07:01  -  03-03-24 @ 07:00  --------------------------------------------------------  IN:    Enteral Tube Flush: 150 mL    Jevity 1.2: 300 mL  Total IN: 450 mL    OUT:  Total OUT: 0 mL    Total NET: 450 mL          LABS:                          8.3    6.06  )-----------( 554      ( 03 Mar 2024 07:44 )             27.7                                                                                                                                                                                                                                                                          MEDICATIONS  (STANDING):  acetylcysteine 20%  Inhalation 4 milliLiter(s) Inhalation every 6 hours  albuterol/ipratropium for Nebulization 3 milliLiter(s) Nebulizer every 6 hours  AQUAPHOR (petrolatum Ointment) 1 Application(s) Topical two times a day  artificial  tears Solution 1 Drop(s) Both EYES two times a day  calcium carbonate   1250 mG (OsCal) 1 Tablet(s) Oral two times a day  caspofungin IVPB      caspofungin IVPB 50 milliGRAM(s) IV Intermittent every 24 hours  chlorhexidine 2% Cloths 1 Application(s) Topical daily  chlorhexidine 2% Cloths 1 Application(s) Topical daily  enoxaparin Injectable 50 milliGRAM(s) SubCutaneous every 12 hours  gabapentin 300 milliGRAM(s) Oral every 12 hours  levETIRAcetam  IVPB 500 milliGRAM(s) IV Intermittent two times a day  melatonin 3 milliGRAM(s) Oral at bedtime  meropenem  IVPB 1000 milliGRAM(s) IV Intermittent every 8 hours  meropenem  IVPB      midodrine. 20 milliGRAM(s) Oral three times a day  pantoprazole  Injectable 40 milliGRAM(s) IV Push every 24 hours  polyethylene glycol 3350 17 Gram(s) Oral at bedtime  raloxifene 60 milliGRAM(s) Oral daily  saccharomyces boulardii 250 milliGRAM(s) Oral two times a day  senna 2 Tablet(s) Oral at bedtime  sodium chloride 0.65% Nasal 2 Spray(s) Both Nostrils three times a day  sodium chloride 0.9% Bolus 500 milliLiter(s) IV Bolus once    MEDICATIONS  (PRN):  aluminum hydroxide/magnesium hydroxide/simethicone Suspension 30 milliLiter(s) Oral every 4 hours PRN Dyspepsia  ondansetron Injectable 4 milliGRAM(s) IV Push every 8 hours PRN Nausea and/or Vomiting    cxr noted

## 2024-03-03 NOTE — PROGRESS NOTE ADULT - SUBJECTIVE AND OBJECTIVE BOX
58F w/ PMHx Down Syndrome, nonverbal at baseline, Hypothyroidism, Cerebral palsy and Seizure Disorders presents to the ED from nursing facility/group home ([facility] presented to ED for respiratory distress and high grade fever. Patient found to be septic on admission.Admitted to SDU for management of acute respiratory distress 2/2 CAP/Aspiration PNA (20 Jan 2024 18:22)  While pt was on the floor she developed dyspnea after swallow evaluation, was spiking high grade fever, consulted by pulmonary/critical care and was upgraded back to SDU.  Pt developed left lung white out, improved after aggressive chest PT, treated with Meropenem and caspofungin, ID is following, her overall prognosis is very poor, she might need trach and PEG in the nearest future , a meeting with facility staff and state took place on 2/14.   Pt completed course o Meropenem, Levofloxacin added on 2/15 ( pt spiked fever), Meropenem resumed on 2/18,  she was tapered off pressure support, requires  high flow oxygen fo a while in SDU  Hb dropped without acute blood loss, will  monitor closely ( pt is o full AC). Now pt is off pressure support.   Today pt is awake, nonverbal, looks comfortable.     PAST MEDICAL & SURGICAL HISTORY:  Down syndrome  Osteoporosis  Mild anemia  Neuropathy  S/P debridement  of R hip on 3/2/21      Vital Signs:  T(C): 36.1 (03 Mar 2024 08:16), Max: 36.6 (02 Mar 2024 21:00)  T(F): 97 (03 Mar 2024 08:16), Max: 97.8 (02 Mar 2024 21:00)  HR: 70 (03 Mar 2024 08:16) (59 - 75)  BP: 101/65 (03 Mar 2024 08:16) (100/63 - 120/56)  BP(mean): 78 (03 Mar 2024 08:16) (78 - 78)  RR: 18 (03 Mar 2024 09:59) (18 - 18)  SpO2: 98% (03 Mar 2024 09:59) (95% - 100%)    Parameters below as of 03 Mar 2024 09:59  Patient On (Oxygen Delivery Method): nasal cannula, high flow  O2 Flow (L/min): 40  O2 Concentration (%): 40      PHYSICAL EXAM:  GENERAL:  NAD chronically ill appearing, mentally challenged   SKIN: No rashes or lesions  HEENT: Atraumatic. Normocephalic   NECK: Supple, No JVD.    PULMONARY: decreased breath sounds B/L, poor air entry   CVS: Normal S1, S2. Rate and Rhythm are regular.    ABDOMEN/GI: Soft, Nontender, Nondistended.  MSK:  contracted LE   NEUROLOGIC: does not follow commands, opens eyes spontaneously   PSYCH: Alert & oriented x 0    Consultant(s) Notes Reviewed:  [x ] YES  [ ] NO  Care Discussed with Consultants/Other Providers [ x] YES  [ ] NO    LABS:                          8.3    6.06  )-----------( 554      ( 03 Mar 2024 07:44 )             27.7   03-03    138  |  98  |  12  ----------------------------<  103<H>  4.4   |  32  |  0.5<L>    Ca    9.1      03 Mar 2024 07:44  Mg     2.4     03-03    TPro  6.7  /  Alb  2.8<L>  /  TBili  <0.2  /  DBili  x   /  AST  16  /  ALT  11  /  AlkPhos  90  03-03      Culture - Blood (collected 25 Feb 2024 11:47)  Source: .Blood None  Preliminary Report (26 Feb 2024 20:02):    No growth at 24 hours    RADIOLOGY & ADDITIONAL TESTS:  Imaging or report Personally Reviewed:  [x] YES  [ ] NO  EKG reviewed: [x] YES  [ ] NO    < from: Xray Chest 1 View- PORTABLE-Routine (02.28.24 @ 05:32) >    Impression:    Bilateral opacifications, left greater than right, stable. Support   devices as described.    < end of copied text >  < from: CT Chest w/ IV Cont (02.21.24 @ 00:09) >  Impression:    Bilateral pneumonia, left worse than right, with a left upper lobe   thick-walled cavity.    Enteric catheter as described. Note that by the time that this report was   dictated, this issue had been tended to..    < end of copied text >  < from: CT Abdomen and Pelvis w/ IV Cont (02.21.24 @ 00:10) >    IMPRESSION:  1.  No CT evidence of an acuteabdominopelvic pathology.    See separately dictated CT chest report for intrathoracic findings.    < end of copied text >  < from: Xray Chest 1 View- PORTABLE-Routine (03.01.24 @ 06:58) >  impression:    EKG leads overlie thorax, obscuring anatomy.    Support devices: NG tube coursing below the left hemidiaphragm. The tip   is not included..    Cardiac/ Mediastinum: Stable    Lungs/ Pleura: Unchanged left greater than right opacities. No   pneumothorax    Skeletal/ soft tissues: Stable      < from: Xray Chest 1 View- PORTABLE-Routine (03.03.24 @ 06:11) >    IMPRESSION:    Unchanged left-sided opacities.    < end of copied text >      MEDICATIONS  (STANDING):  acetylcysteine 20%  Inhalation 4 milliLiter(s) Inhalation every 6 hours  albuterol/ipratropium for Nebulization 3 milliLiter(s) Nebulizer every 6 hours  AQUAPHOR (petrolatum Ointment) 1 Application(s) Topical two times a day  artificial  tears Solution 1 Drop(s) Both EYES two times a day  calcium carbonate   1250 mG (OsCal) 1 Tablet(s) Oral two times a day  caspofungin IVPB      caspofungin IVPB 50 milliGRAM(s) IV Intermittent every 24 hours  chlorhexidine 2% Cloths 1 Application(s) Topical daily  chlorhexidine 2% Cloths 1 Application(s) Topical daily  enoxaparin Injectable 50 milliGRAM(s) SubCutaneous every 12 hours  gabapentin 300 milliGRAM(s) Oral every 12 hours  levETIRAcetam  IVPB 500 milliGRAM(s) IV Intermittent two times a day  melatonin 3 milliGRAM(s) Oral at bedtime  midodrine. 20 milliGRAM(s) Oral three times a day  pantoprazole  Injectable 40 milliGRAM(s) IV Push every 24 hours  polyethylene glycol 3350 17 Gram(s) Oral at bedtime  raloxifene 60 milliGRAM(s) Oral daily  saccharomyces boulardii 250 milliGRAM(s) Oral two times a day  senna 2 Tablet(s) Oral at bedtime  sodium chloride 0.65% Nasal 2 Spray(s) Both Nostrils three times a day  sodium chloride 0.9% Bolus 500 milliLiter(s) IV Bolus once    MEDICATIONS  (PRN):  aluminum hydroxide/magnesium hydroxide/simethicone Suspension 30 milliLiter(s) Oral every 4 hours PRN Dyspepsia  ondansetron Injectable 4 milliGRAM(s) IV Push every 8 hours PRN Nausea and/or Vomiting

## 2024-03-03 NOTE — PROGRESS NOTE ADULT - ASSESSMENT
IMPRESSION:    Acute hypoxemic respiratory failure on HHFNC/ NRM  PNA  Sepsis POA  Anemia  Septic shock off Levophed   Recurrent aspiration pneumonia / prior intubation  HO DVT on Eliquis   HO GI bleed  HO OM  HO recent duodenal perforation   HO polymicrobial bacteremia   H/o CP, DS  H/o seizures    PLAN:    CNS:  Avoid CNS Depressant, AED. MS at baseline.    HEENT: Oral care.     PULMONARY: HOB at 45 degrees.  Aspiration precaution. Wean FiO2 Keep spo2 92 TO 96%. CHEST PT    CARDIOVASCULAR: Keep MAP more than 60. Avoid overload. C/w midodrine     GI: Protonix. NG feeding.     INFECTIOUS DISEASE:  ABX and Anti fungal per ID.     HEMATOLOGICAL:  Lovenox therapeutic.  Monitor CBC    ENDOCRINE:  Follow up FS.  Insulin protocol if needed.    MUSCULOSKELETAL: Bedrest.  Off loading.  Wound care.    Prognosis very poor.    Palliative care following  FLOOR  dnr/i

## 2024-03-03 NOTE — PROGRESS NOTE ADULT - ASSESSMENT
58F w/ PMHx Down Syndrome, nonverbal at baseline, Hypothyroidism, Cerebral palsy and Seizure Disorders presents to the ED from nursing facility/group home ([facility]) presented to ED for respiratory distress and high grade fever. Patient found to be septic on admission. Admitted to SDU for management of acute respiratory distress 2/2 CAP/Aspiration PNA (20 Jan 2024 18:22)  While pt was on the floor she developed dyspnea after swallow evaluation, was spiking high grade fever, consulted by pulmonary/critical care and was upgraded back to SDU.      A/P  # Sepsis POA / Acute hypoxic resp failure / RSV bronchiolitis /  H/o Dysphagia/ suspected aspiration    - taper off  high flow oxygen   - clinically improving   - off pressors now   - continue midodrine 20 Q8H  - continue caspo, danna until 3/10 per ID   - Failed FEES,  NG tube for feedings and medications, patient will need PEG tube once 02 requirements decrease  - c/w duoneb, chest PT, aspiration precautions   - pulmonary is following  - aggressive chest PT with vest, aspiration precautions and suction       Elevated Troponin , type 2 MI/  Sinus tachycardia  - c/w telemetry monitoring   - Repeat EKG: Normal sinus rhythm  - c/w  metoprolol  - TTE 2/19 normal EF no DD or valve abnormalities    # Seizure Disorder  - c/w  Keppra   - seizure precautions  - keep Mg above 2.0     # H/o lower ext DVT   - c/w therapeutic Lovenox, Eliquis on dc    #  Hypothyroidism  - TSH 0.51    # Normocytic Anemia, anemia of chronic disease   - monitor H/H, keep Hb above 7.5     # Down Syndrome/  Cerebral Palsy/ functional quadriplegia   - supportive care  - prevent falls and aspiration   - failed speech and swallow, needs PEG as above  - on Raloxifene     Palliative followed up ,  pt is [facility], paperwork singed on 2/29  by palliative care attending and myself, DNR/DNI was approved by [facility], will c/w caspofungin and Abx to complete the course, if there is no improvement will change level of care to CMO ( change in level of care was approved by [employer] [place] [employer], paperwork in the paper chart)     #Progress Note Handoff  Pending (specify):  taper off high flow oxygen, supportive care, agressive pulmonary toilet, chest pt, aspiration precautions, , c/w treatment with Abx and Caspofungin until 3/10, if there is no improvement will change the level of care ( see above)   Family discussion: n/a, spoke with an aid   Disposition: [facility] _____

## 2024-03-04 LAB
GLUCOSE BLDC GLUCOMTR-MCNC: 112 MG/DL — HIGH (ref 70–99)
GLUCOSE BLDC GLUCOMTR-MCNC: 131 MG/DL — HIGH (ref 70–99)
GLUCOSE BLDC GLUCOMTR-MCNC: 144 MG/DL — HIGH (ref 70–99)

## 2024-03-04 PROCEDURE — 99232 SBSQ HOSP IP/OBS MODERATE 35: CPT

## 2024-03-04 PROCEDURE — 99233 SBSQ HOSP IP/OBS HIGH 50: CPT

## 2024-03-04 PROCEDURE — 71045 X-RAY EXAM CHEST 1 VIEW: CPT | Mod: 26

## 2024-03-04 RX ORDER — ERYTHROMYCIN BASE 5 MG/GRAM
1 OINTMENT (GRAM) OPHTHALMIC (EYE) DAILY
Refills: 0 | Status: DISCONTINUED | OUTPATIENT
Start: 2024-03-04 | End: 2024-04-08

## 2024-03-04 RX ADMIN — LEVETIRACETAM 400 MILLIGRAM(S): 250 TABLET, FILM COATED ORAL at 05:33

## 2024-03-04 RX ADMIN — Medication 3 MILLIGRAM(S): at 22:24

## 2024-03-04 RX ADMIN — Medication 3 MILLILITER(S): at 20:16

## 2024-03-04 RX ADMIN — Medication 4 MILLILITER(S): at 20:17

## 2024-03-04 RX ADMIN — Medication 1 TABLET(S): at 05:32

## 2024-03-04 RX ADMIN — RALOXIFENE HYDROCHLORIDE 60 MILLIGRAM(S): 60 TABLET, COATED ORAL at 11:40

## 2024-03-04 RX ADMIN — Medication 2 SPRAY(S): at 22:24

## 2024-03-04 RX ADMIN — ENOXAPARIN SODIUM 50 MILLIGRAM(S): 100 INJECTION SUBCUTANEOUS at 17:16

## 2024-03-04 RX ADMIN — CHLORHEXIDINE GLUCONATE 1 APPLICATION(S): 213 SOLUTION TOPICAL at 11:39

## 2024-03-04 RX ADMIN — MIDODRINE HYDROCHLORIDE 20 MILLIGRAM(S): 2.5 TABLET ORAL at 11:39

## 2024-03-04 RX ADMIN — Medication 2 SPRAY(S): at 05:34

## 2024-03-04 RX ADMIN — Medication 4 MILLILITER(S): at 09:20

## 2024-03-04 RX ADMIN — POLYETHYLENE GLYCOL 3350 17 GRAM(S): 17 POWDER, FOR SOLUTION ORAL at 22:24

## 2024-03-04 RX ADMIN — GABAPENTIN 300 MILLIGRAM(S): 400 CAPSULE ORAL at 05:32

## 2024-03-04 RX ADMIN — LEVETIRACETAM 400 MILLIGRAM(S): 250 TABLET, FILM COATED ORAL at 17:17

## 2024-03-04 RX ADMIN — Medication 1 APPLICATION(S): at 18:05

## 2024-03-04 RX ADMIN — SENNA PLUS 2 TABLET(S): 8.6 TABLET ORAL at 22:24

## 2024-03-04 RX ADMIN — Medication 4 MILLILITER(S): at 14:14

## 2024-03-04 RX ADMIN — Medication 1 TABLET(S): at 17:17

## 2024-03-04 RX ADMIN — Medication 2 SPRAY(S): at 15:52

## 2024-03-04 RX ADMIN — Medication 3 MILLILITER(S): at 09:19

## 2024-03-04 RX ADMIN — Medication 1 DROP(S): at 17:22

## 2024-03-04 RX ADMIN — Medication 3 MILLILITER(S): at 14:14

## 2024-03-04 RX ADMIN — Medication 1 APPLICATION(S): at 15:52

## 2024-03-04 RX ADMIN — CASPOFUNGIN ACETATE 260 MILLIGRAM(S): 7 INJECTION, POWDER, LYOPHILIZED, FOR SOLUTION INTRAVENOUS at 11:35

## 2024-03-04 RX ADMIN — PANTOPRAZOLE SODIUM 40 MILLIGRAM(S): 20 TABLET, DELAYED RELEASE ORAL at 05:32

## 2024-03-04 RX ADMIN — CHLORHEXIDINE GLUCONATE 1 APPLICATION(S): 213 SOLUTION TOPICAL at 11:40

## 2024-03-04 RX ADMIN — Medication 1 DROP(S): at 05:53

## 2024-03-04 RX ADMIN — GABAPENTIN 300 MILLIGRAM(S): 400 CAPSULE ORAL at 17:17

## 2024-03-04 RX ADMIN — MIDODRINE HYDROCHLORIDE 20 MILLIGRAM(S): 2.5 TABLET ORAL at 17:16

## 2024-03-04 RX ADMIN — ENOXAPARIN SODIUM 50 MILLIGRAM(S): 100 INJECTION SUBCUTANEOUS at 05:31

## 2024-03-04 RX ADMIN — Medication 250 MILLIGRAM(S): at 05:32

## 2024-03-04 RX ADMIN — Medication 250 MILLIGRAM(S): at 17:16

## 2024-03-04 RX ADMIN — MIDODRINE HYDROCHLORIDE 20 MILLIGRAM(S): 2.5 TABLET ORAL at 05:31

## 2024-03-04 NOTE — PROGRESS NOTE ADULT - ASSESSMENT
58F w/ PMHx Down Syndrome, nonverbal at baseline, Hypothyroidism, Cerebral palsy and Seizure Disorders presents to the ED from nursing facility/group home ([facility]) presented to ED for respiratory distress and high grade fever. Patient found to be septic on admission. Admitted to SDU for management of acute respiratory distress 2/2 CAP/Aspiration PNA (20 Jan 2024 18:22)  While pt was on the floor she developed dyspnea after swallow evaluation, was spiking high grade fever, consulted by pulmonary/critical care and was upgraded back to SDU.      A/P  # Sepsis POA / Acute hypoxic resp failure / RSV bronchiolitis /  H/o Dysphagia/ suspected aspiration    - taper off  high flow oxygen   - clinically improving,  off pressors   - continue midodrine 20 Q8H  - continue caspo, danna until 3/10 per ID   - Failed FEES,  NG tube for feedings and medications, patient might  need PEG tube once 02 requirements decrease ( in case if level of care is still the same, consumer advisory board agreed for CMO ( paperwork in the the paper chart)   - c/w duoneb, chest PT, aspiration precautions   - pulmonary is following  - aggressive chest PT with vest, aspiration precautions and suction     # Conjunctivitis   - start erythromycin ointment both eyes     #  Elevated Troponin , type 2 MI/  Sinus tachycardia  - c/w telemetry monitoring   - Repeat EKG: Normal sinus rhythm  - c/w  metoprolol  - TTE 2/19 normal EF no DD or valve abnormalities    # Seizure Disorder  - c/w  Keppra   - seizure precautions  - keep Mg above 2.0     # H/o lower ext DVT   - c/w therapeutic Lovenox, Eliquis on dc    #  Hypothyroidism  - TSH 0.51    # Normocytic Anemia, anemia of chronic disease   - monitor H/H, keep Hb above 7.5     # Down Syndrome/  Cerebral Palsy/ functional quadriplegia   - supportive care  - prevent falls and aspiration   - failed speech and swallow, needs PEG as above  - on Raloxifene     Palliative followed up ,  pt is [facility], paperwork singed on 2/29  by palliative care attending and myself, DNR/DNI was approved by [facility], will c/w caspofungin and Abx to complete the course, if there is no improvement will change level of care to CMO ( change in level of care was approved by [employer] [place] [employer], paperwork in the paper chart)     #Progress Note Handoff  Pending (specify):  taper off high flow oxygen, supportive care, aggressive pulmonary toilet, chest pt, aspiration precautions, , c/w treatment with Abx and Caspofungin until 3/10, if there is no improvement will change the level of care ( see above) to CMO, start erythromycin ointment for conjunctivitis.   Family discussion: n/a, spoke with an aid   Disposition: DGTF

## 2024-03-04 NOTE — PROGRESS NOTE ADULT - ASSESSMENT
IMPRESSION:    Acute hypoxemic respiratory failure on HHFNC/ NRM  PNA s/p ABx  Sepsis POA  Anemia  Septic shock, off Levophed   Recurrent aspiration pneumonia / prior intubation  Elevated fungitell - on Caspo  HO DVT on Eliquis   HO GI bleed  HO OM  HO recent duodenal perforation   HO polymicrobial bacteremia   H/o CP, DS  H/o seizures    PLAN:    CNS:  Avoid CNS Depressant, AED. MS at baseline.    HEENT: Oral care.     PULMONARY: HOB at 45 degrees.  Aspiration precaution. Wean FiO2 Keep spo2 92 TO 96%. CHEST PT. Nebs q .    CARDIOVASCULAR: Keep MAP more than 60. Avoid overload. C/w midodrine    GI: Protonix. NG feeding. Consent for PEG    INFECTIOUS DISEASE:  ABX and Anti fungal per ID.     HEMATOLOGICAL:  Lovenox therapeutic.  Monitor CBC.    ENDOCRINE:  Follow up FS.  Insulin protocol if needed.    MUSCULOSKELETAL: Bedrest.  Off loading.  Wound care.    Prognosis very poor.    Palliative care following  FLOOR  dnr/i

## 2024-03-04 NOTE — PROGRESS NOTE ADULT - SUBJECTIVE AND OBJECTIVE BOX
HPI: 58F with Down syndrome, nonverbal at baseline, hypothyroidism, CP, and seizure disorder here from [facility] with fever and respiratory distress. Found to be septic on admission, from CAP/aspiration PNA, on IV vanco and meropenem, with course c/b continued fevers, and bacteremia with S. hominis. Also failed FEES, has NGT in place and will likely need PEG. Is requiring HFNC alternating with BiPAP. Is also on midodrine for hypotension. Patient is full code. Of note patient is under [facility]. Palliative care consulted for [facility].    INTERVAL EVENTS  2/29: patient appears comfortable overall, group home staff at bedside  3/1: patient appears more comfortable today, no objective signs of pain or discomfort  3/4: patient appears comfortable    ADVANCE DIRECTIVES:    [ ] Full Code [X ] DNR  MOLST  [ ]  Living Will  [ ]   DECISION MAKER(s):  [ ] Health Care Proxy(s)  [ ] Surrogate(s)  [ ] Guardian           Name(s): Phone Number(s): [facility]    BASELINE (I)ADL(s) (prior to admission):  [place]: [ ]Total  [ ] Moderate [ ]Dependent  Palliative Performance Status Version 2:         %    http://npcrc.org/files/news/palliative_performance_scale_ppsv2.pdf    Allergies    No Known Allergies    Intolerances    MEDICATIONS  (STANDING):  acetylcysteine 20%  Inhalation 4 milliLiter(s) Inhalation every 6 hours  albuterol/ipratropium for Nebulization 3 milliLiter(s) Nebulizer every 6 hours  AQUAPHOR (petrolatum Ointment) 1 Application(s) Topical two times a day  artificial  tears Solution 1 Drop(s) Both EYES two times a day  calcium carbonate   1250 mG (OsCal) 1 Tablet(s) Oral two times a day  caspofungin IVPB      caspofungin IVPB 50 milliGRAM(s) IV Intermittent every 24 hours  chlorhexidine 2% Cloths 1 Application(s) Topical daily  chlorhexidine 2% Cloths 1 Application(s) Topical daily  enoxaparin Injectable 50 milliGRAM(s) SubCutaneous every 12 hours  erythromycin   Ointment 1 Application(s) Both EYES daily  gabapentin 300 milliGRAM(s) Oral every 12 hours  levETIRAcetam  IVPB 500 milliGRAM(s) IV Intermittent two times a day  melatonin 3 milliGRAM(s) Oral at bedtime  midodrine. 20 milliGRAM(s) Oral three times a day  pantoprazole  Injectable 40 milliGRAM(s) IV Push every 24 hours  polyethylene glycol 3350 17 Gram(s) Oral at bedtime  raloxifene 60 milliGRAM(s) Oral daily  saccharomyces boulardii 250 milliGRAM(s) Oral two times a day  senna 2 Tablet(s) Oral at bedtime  sodium chloride 0.65% Nasal 2 Spray(s) Both Nostrils three times a day  sodium chloride 0.9% Bolus 500 milliLiter(s) IV Bolus once    MEDICATIONS  (PRN):  aluminum hydroxide/magnesium hydroxide/simethicone Suspension 30 milliLiter(s) Oral every 4 hours PRN Dyspepsia  ondansetron Injectable 4 milliGRAM(s) IV Push every 8 hours PRN Nausea and/or Vomiting    PRESENT SYMPTOMS: [X ]Unable to obtain due to poor mentation   Source if other than patient:  [ ]Family   [ ]Team     Pain: [ ]yes [ ]no  QOL impact -   Location -                    Aggravating factors -  Quality -  Radiation -  Timing-  Severity (0-10 scale):  Minimal acceptable level (0-10 scale):     CPOT:    https://www.[facility].org/getattachment/[email or web address]-9561w6474m9x/Critical-Care-Pain-Observation-Tool-(CPOT)    PAIN AD Score: 0  [email or web address].[email or web address]/painad.pdf (press ctrl +  left click to view)    Dyspnea:                           [ ]None[ ]Mild [ ]Moderate [ ]Severe     Respiratory Distress Observation Scale (RDOS): 1  A score of 0 to 2 signifies little or no respiratory distress, 3 signifies mild distress, scores 4 to 6 indicate moderate distress, and scores greater than 7 signify severe distress  https://[email or web address]/sites/default/files/PDFS/954446-urgnrcgcxrf-cttdeeaw-gwlxkwdmgti-ityei.pdf    Anxiety:                             [ ]None[ ]Mild [ ]Moderate [ ]Severe   Fatigue:                             [ ]None[ ]Mild [ ]Moderate [ ]Severe   Nausea:                             [ ]None[ ]Mild [ ]Moderate [ ]Severe   Loss of appetite:              [ ]None[ ]Mild [ ]Moderate [ ]Severe   Constipation:                    [ ]None[ ]Mild [ ]Moderate [ ]Severe    Other Symptoms:  [ ]All other review of systems negative     Palliative Performance Status Version 2:         %    http://npcrc.org/files/news/palliative_performance_scale_ppsv2.pdf  PHYSICAL EXAM:  Vital Signs Last 24 Hrs  T(C): 36.4 (04 Mar 2024 11:45), Max: 36.9 (04 Mar 2024 00:41)  T(F): 97.6 (04 Mar 2024 11:45), Max: 98.5 (04 Mar 2024 00:41)  HR: 89 (04 Mar 2024 11:45) (67 - 100)  BP: 119/61 (04 Mar 2024 11:45) (82/52 - 120/62)  BP(mean): 84 (04 Mar 2024 11:45) (83 - 84)  RR: 20 (04 Mar 2024 11:45) (16 - 20)  SpO2: 99% (04 Mar 2024 11:45) (96% - 99%)    Parameters below as of 04 Mar 2024 11:45  Patient On (Oxygen Delivery Method): nasal cannula, high flow          GENERAL:  [X ] No acute distress [ ]Lethargic  [ ]Unarousable  [ ]Verbal  [ ]Non-Verbal [ ]Cachexia    BEHAVIORAL/PSYCH:  [ ]Alert and Oriented x  [ ] Anxiety [ ] Delirium [ ] Agitation [X ] Calm   EYES: [ X] No scleral icterus [ ] Scleral icterus [ ] Closed  ENMT:  [ ]Dry mouth  [ ]No external oral lesions [ X] No external ear or nose lesions  CARDIOVASCULAR:  [ ]Regular [ ]Irregular [ ]Tachy [ ]Not Tachy  [ ]Raheem [ ] Edema [ ] No edema  PULMONARY:  [ ]Tachypnea  [ ]Audible excessive secretions [X ] No labored breathing [ ] labored breathing  GASTROINTESTINAL: [ ]Soft  [ ]Distended  [ X]Not distended [ ]Non tender [ ]Tender  MUSCULOSKELETAL: [ ]No clubbing [ ] clubbing  [ X] No cyanosis [ ] cyanosis  NEUROLOGIC: [ ]No focal deficits  [ ]Follows commands  [ ]Does not follow commands  [X ]Cognitive impairment  [ ]Dysphagia  [ ]Dysarthria  [ ]Paresis   SKIN: [ ] Jaundiced [X ] Non-jaundiced [ ]Rash [ ]No Rash [ ] Warm [ ] Dry  MISC/LINES: [ ] ET tube [ ] Trach [ ]NGT/OGT [ ]PEG [ ][place]    CRITICAL CARE:  [ ] Shock Present  [ ]Septic [ ]Cardiogenic [ ]Neurologic [ ]Hypovolemic  [ ]  Vasopressors [ ]  Inotropes   [ ]Respiratory failure present [ ]Mechanical ventilation [ ]Non-invasive ventilatory support [ ]High flow  [ ]Acute  [ ]Chronic [ ]Hypoxic  [ ]Hypercarbic [ ]Other  [ ]Other organ failure     LABS: reviewed by me                          8.3    6.06  )-----------( 554      ( 03 Mar 2024 07:44 )             27.7     03-03    138  |  98  |  12  ----------------------------<  103<H>  4.4   |  32  |  0.5<L>    Ca    9.1      03 Mar 2024 07:44  Mg     2.4     03-03          RADIOLOGY & ADDITIONAL STUDIES: reviewed by m    CXR 2/5/24    Support devices: Enteric tube satisfactory position.    Cardiac/ Mediastinum: unremarkable    Lungs/ Pleura: Diminishing left lung opacity/effusion. Stable smaller   right basilar opacity. No pneumothorax.    Skeletal/ soft tissues: Stable    PROTEIN CALORIE MALNUTRITION PRESENT: [ ]mild [ ]moderate [ ]severe [ ]underweight [ ]morbid obesity  https://www.andeal.org/vault/2440/web/files/ONC/Table_Clinical%20Characteristics%20to%20Document%20Malnutrition-White%20JV%20et%20al%202012.pdf    Height (cm): 136.4 (01-24-24 @ 09:52), 154.9 (11-17-23 @ 15:00), 145 (10-02-23 @ 12:00)  Weight (kg): 52.3 (01-21-24 @ 08:00), 60 (11-17-23 @ 15:00), 55.3 (10-02-23 @ 12:00)  BMI (kg/m2): 28.1 (01-24-24 @ 09:52), 21.8 (01-21-24 @ 08:00), 25 (11-17-23 @ 15:00)    [ ]PPSV2 < or = to 30% [ ]significant weight loss  [ ]poor nutritional intake  [ ]anasarca      [ ]Artificial Nutrition      Palliative Care Spiritual/Emotional Screening Tool Question  Severity (0-4):                    OR                    [X ] Unable to determine/NA  Score of 2 or greater indicates recommendation of Chaplaincy referral  Chaplaincy Referral: [ ] Yes [ ] Refused [ ] Following     Caregiver [place]:  [ ] Yes [ ] No    OR    [x ] Unable to determine. Will assess at later time if appropriate.  Social Work Referral [ ]  Patient and Family Centered Care Referral [ ]    Anticipatory Grief Present: [ ] Yes [ ] No    OR     [ x] Unable to determine. Will assess at later time if appropriate.  Social Work Referral [ ]  Patient and Family Centered Care Referral [ ]    REFERRALS:   [ ]Chaplaincy  [ ]Hospice  [ ]Child Life  [ ]Social Work  [ ]Case management [ ]Holistic Therapy     Palliative care education provided to patient and/or family    Goals of Care Document:     ______________  Wu Jenkins MD  Palliative Medicine  [facility]   of Geriatric and Palliative Medicine  [phone]

## 2024-03-04 NOTE — PROGRESS NOTE ADULT - ASSESSMENT
58F with Down syndrome, nonverbal at baseline, hypothyroidism, CP, and seizure disorder here from [facility] with fever and respiratory distress. Found to be septic on admission, from CAP/aspiration PNA, on IV vanco and meropenem, with course c/b continued fevers, and bacteremia with S. hominis. Also failed FEES, has NGT in place and will likely need PEG. Is requiring HFNC alternating with BiPAP. Is also on midodrine for hypotension. Patient is full code. Of note patient is under [place] [facility]. Palliative care consulted for [facility].

## 2024-03-04 NOTE — PROGRESS NOTE ADULT - SUBJECTIVE AND OBJECTIVE BOX
Patient is a 58y old  Female who presents with a chief complaint of Respiratory Distress (03 Mar 2024 14:25)        Over Night Events:  No overnight events. On HFNC 50/40      ROS:     All ROS are negative except HPI         PHYSICAL EXAM    ICU Vital Signs Last 24 Hrs  T(C): 36.4 (04 Mar 2024 08:13), Max: 36.9 (04 Mar 2024 00:41)  T(F): 97.6 (04 Mar 2024 08:13), Max: 98.5 (04 Mar 2024 00:41)  HR: 94 (04 Mar 2024 08:13) (67 - 100)  BP: 120/62 (04 Mar 2024 08:13) (82/52 - 120/62)  BP(mean): 83 (04 Mar 2024 08:13) (83 - 83)  ABP: --  ABP(mean): --  RR: 20 (04 Mar 2024 08:13) (16 - 20)  SpO2: 96% (04 Mar 2024 08:13) (96% - 99%)    O2 Parameters below as of 04 Mar 2024 08:13  Patient On (Oxygen Delivery Method): nasal cannula, high flow        ENT: Airway patent,  EYES: Pupils equal, Round and reactive to light.  CARDIAC: Normal rate, Regular rhythm.    RESPIRATORY: No wheezing, R crackles., Not tachypneic, No use of accessory muscles  GASTROINTESTINAL: Abdomen soft, Non-tender, No guarding,   NEUROLOGICAL: Arousable  SKIN: Skin normal color for race, Warm and dry and intact.   R blister  Foot wound        03-03-24 @ 07:01  -  03-04-24 @ 07:00  --------------------------------------------------------  IN:    Enteral Tube Flush: 150 mL    Jevity 1.2: 300 mL  Total IN: 450 mL    OUT:    Voided (mL): 1800 mL  Total OUT: 1800 mL    Total NET: -1350 mL        LABS:                            8.3    6.06  )-----------( 554      ( 03 Mar 2024 07:44 )             27.7                                               03-03    138  |  98  |  12  ----------------------------<  103<H>  4.4   |  32  |  0.5<L>    Ca    9.1      03 Mar 2024 07:44  Mg     2.4     03-03    TPro  6.7  /  Alb  2.8<L>  /  TBili  <0.2  /  DBili  x   /  AST  16  /  ALT  11  /  AlkPhos  90  03-03                                             Urinalysis Basic - ( 03 Mar 2024 07:44 )    Color: x / Appearance: x / SG: x / pH: x  Gluc: 103 mg/dL / Ketone: x  / Bili: x / Urobili: x   Blood: x / Protein: x / Nitrite: x   Leuk Esterase: x / RBC: x / WBC x   Sq Epi: x / Non Sq Epi: x / Bacteria: x                                                  LIVER FUNCTIONS - ( 03 Mar 2024 07:44 )  Alb: 2.8 g/dL / Pro: 6.7 g/dL / ALK PHOS: 90 U/L / ALT: 11 U/L / AST: 16 U/L / GGT: x                                                                                        MEDICATIONS  (STANDING):  acetylcysteine 20%  Inhalation 4 milliLiter(s) Inhalation every 6 hours  albuterol/ipratropium for Nebulization 3 milliLiter(s) Nebulizer every 6 hours  AQUAPHOR (petrolatum Ointment) 1 Application(s) Topical two times a day  artificial  tears Solution 1 Drop(s) Both EYES two times a day  calcium carbonate   1250 mG (OsCal) 1 Tablet(s) Oral two times a day  caspofungin IVPB      caspofungin IVPB 50 milliGRAM(s) IV Intermittent every 24 hours  chlorhexidine 2% Cloths 1 Application(s) Topical daily  chlorhexidine 2% Cloths 1 Application(s) Topical daily  enoxaparin Injectable 50 milliGRAM(s) SubCutaneous every 12 hours  gabapentin 300 milliGRAM(s) Oral every 12 hours  levETIRAcetam  IVPB 500 milliGRAM(s) IV Intermittent two times a day  melatonin 3 milliGRAM(s) Oral at bedtime  midodrine. 20 milliGRAM(s) Oral three times a day  pantoprazole  Injectable 40 milliGRAM(s) IV Push every 24 hours  polyethylene glycol 3350 17 Gram(s) Oral at bedtime  raloxifene 60 milliGRAM(s) Oral daily  saccharomyces boulardii 250 milliGRAM(s) Oral two times a day  senna 2 Tablet(s) Oral at bedtime  sodium chloride 0.65% Nasal 2 Spray(s) Both Nostrils three times a day  sodium chloride 0.9% Bolus 500 milliLiter(s) IV Bolus once    MEDICATIONS  (PRN):  aluminum hydroxide/magnesium hydroxide/simethicone Suspension 30 milliLiter(s) Oral every 4 hours PRN Dyspepsia  ondansetron Injectable 4 milliGRAM(s) IV Push every 8 hours PRN Nausea and/or Vomiting

## 2024-03-04 NOTE — PROGRESS NOTE ADULT - ATTENDING COMMENTS
IMPRESSION:    Acute hypoxemic respiratory failure on HHFNC/ NRM  PNA s/p ABx  Sepsis POA  Anemia  Septic shock, off Levophed   Recurrent aspiration pneumonia / prior intubation  Elevated fungitell - on Caspo  HO DVT on Eliquis   HO GI bleed  HO OM  HO recent duodenal perforation   HO polymicrobial bacteremia   H/o CP, DS  H/o seizures    Plan as outlined above

## 2024-03-04 NOTE — PROGRESS NOTE ADULT - SUBJECTIVE AND OBJECTIVE BOX
58F w/ PMHx Down Syndrome, nonverbal at baseline, Hypothyroidism, Cerebral palsy and Seizure Disorders presents to the ED from nursing facility/group home ([facility]) presented to ED for respiratory distress and high grade fever. Patient found to be septic on admission.Admitted to SDU for management of acute respiratory distress 2/2 CAP/Aspiration PNA (20 Jan 2024 18:22)  While pt was on the floor she developed dyspnea after swallow evaluation, was spiking high grade fever, consulted by pulmonary/critical care and was upgraded back to SDU.  Pt developed left lung white out, improved after aggressive chest PT, treated with Meropenem and caspofungin, ID is following, her overall prognosis is very poor, she might need trach and PEG in the nearest future , a meeting with facility staff and state took place on 2/14.   Pt completed course o Meropenem, Levofloxacin added on 2/15 ( pt spiked fever), Meropenem resumed on 2/18,  she was tapered off pressure support, requires  high flow oxygen fo a while in SDU  Hb dropped without acute blood loss, will  monitor closely ( pt is o full AC). Now pt is off pressure support.   Today pt is awake, nonverbal, purulent discharge noted both eyes.     PAST MEDICAL & SURGICAL HISTORY:  Down syndrome  Osteoporosis  Mild anemia  Neuropathy  S/P debridement  of R hip on 3/2/21      Vital Signs:  T(C): 36.4 (04 Mar 2024 11:45), Max: 36.9 (04 Mar 2024 00:41)  T(F): 97.6 (04 Mar 2024 11:45), Max: 98.5 (04 Mar 2024 00:41)  HR: 89 (04 Mar 2024 11:45) (67 - 100)  BP: 119/61 (04 Mar 2024 11:45) (82/52 - 120/62)  BP(mean): 84 (04 Mar 2024 11:45) (83 - 84)  RR: 20 (04 Mar 2024 11:45) (16 - 20)  SpO2: 99% (04 Mar 2024 11:45) (96% - 99%)    Parameters below as of 04 Mar 2024 11:45  Patient On (Oxygen Delivery Method): nasal cannula, high flow          PHYSICAL EXAM:  GENERAL:  NAD chronically ill appearing, mentally challenged   SKIN: No rashes or lesions  HEENT: Atraumatic. Normocephalic   NECK: Supple, No JVD.    PULMONARY: decreased breath sounds B/L, poor air entry   CVS: Normal S1, S2. Rate and Rhythm are regular.    ABDOMEN/GI: Soft, Nontender, Nondistended.  MSK:  contracted LE   NEUROLOGIC: does not follow commands, opens eyes spontaneously   PSYCH: Alert & oriented x 0    Consultant(s) Notes Reviewed:  [x ] YES  [ ] NO  Care Discussed with Consultants/Other Providers [ x] YES  [ ] NO    LABS:                          8.3    6.06  )-----------( 554      ( 03 Mar 2024 07:44 )             27.7   03-03    138  |  98  |  12  ----------------------------<  103<H>  4.4   |  32  |  0.5<L>    Ca    9.1      03 Mar 2024 07:44  Mg     2.4     03-03    TPro  6.7  /  Alb  2.8<L>  /  TBili  <0.2  /  DBili  x   /  AST  16  /  ALT  11  /  AlkPhos  90  03-03    Culture - Blood (collected 25 Feb 2024 11:47)  Source: .Blood None  Preliminary Report (26 Feb 2024 20:02):    No growth at 24 hours    RADIOLOGY & ADDITIONAL TESTS:  Imaging or report Personally Reviewed:  [x] YES  [ ] NO  EKG reviewed: [x] YES  [ ] NO    < from: Xray Chest 1 View- PORTABLE-Routine (02.28.24 @ 05:32) >    Impression:    Bilateral opacifications, left greater than right, stable. Support   devices as described.    < end of copied text >  < from: CT Chest w/ IV Cont (02.21.24 @ 00:09) >  Impression:    Bilateral pneumonia, left worse than right, with a left upper lobe   thick-walled cavity.    Enteric catheter as described. Note that by the time that this report was   dictated, this issue had been tended to..    < end of copied text >  < from: CT Abdomen and Pelvis w/ IV Cont (02.21.24 @ 00:10) >    IMPRESSION:  1.  No CT evidence of an acuteabdominopelvic pathology.    See separately dictated CT chest report for intrathoracic findings.    < end of copied text >  < from: Xray Chest 1 View- PORTABLE-Routine (03.01.24 @ 06:58) >  impression:    EKG leads overlie thorax, obscuring anatomy.    Support devices: NG tube coursing below the left hemidiaphragm. The tip   is not included..    Cardiac/ Mediastinum: Stable    Lungs/ Pleura: Unchanged left greater than right opacities. No   pneumothorax    Skeletal/ soft tissues: Stable      < from: Xray Chest 1 View- PORTABLE-Routine (03.03.24 @ 06:11) >    IMPRESSION:    Unchanged left-sided opacities.    < end of copied text >      MEDICATIONS  (STANDING):  acetylcysteine 20%  Inhalation 4 milliLiter(s) Inhalation every 6 hours  albuterol/ipratropium for Nebulization 3 milliLiter(s) Nebulizer every 6 hours  AQUAPHOR (petrolatum Ointment) 1 Application(s) Topical two times a day  artificial  tears Solution 1 Drop(s) Both EYES two times a day  calcium carbonate   1250 mG (OsCal) 1 Tablet(s) Oral two times a day  caspofungin IVPB      caspofungin IVPB 50 milliGRAM(s) IV Intermittent every 24 hours  chlorhexidine 2% Cloths 1 Application(s) Topical daily  chlorhexidine 2% Cloths 1 Application(s) Topical daily  enoxaparin Injectable 50 milliGRAM(s) SubCutaneous every 12 hours  erythromycin   Ointment 1 Application(s) Both EYES daily  gabapentin 300 milliGRAM(s) Oral every 12 hours  levETIRAcetam  IVPB 500 milliGRAM(s) IV Intermittent two times a day  melatonin 3 milliGRAM(s) Oral at bedtime  midodrine. 20 milliGRAM(s) Oral three times a day  pantoprazole  Injectable 40 milliGRAM(s) IV Push every 24 hours  polyethylene glycol 3350 17 Gram(s) Oral at bedtime  raloxifene 60 milliGRAM(s) Oral daily  saccharomyces boulardii 250 milliGRAM(s) Oral two times a day  senna 2 Tablet(s) Oral at bedtime  sodium chloride 0.65% Nasal 2 Spray(s) Both Nostrils three times a day  sodium chloride 0.9% Bolus 500 milliLiter(s) IV Bolus once    MEDICATIONS  (PRN):  aluminum hydroxide/magnesium hydroxide/simethicone Suspension 30 milliLiter(s) Oral every 4 hours PRN Dyspepsia  ondansetron Injectable 4 milliGRAM(s) IV Push every 8 hours PRN Nausea and/or Vomiting

## 2024-03-04 NOTE — CHART NOTE - NSCHARTNOTEFT_GEN_A_CORE
MICU DOWN GRADE NOTE      Patient is a 58y old  Female who presents with a chief complaint of Respiratory Distress (04 Mar 2024 15:00)      HPI:  58F w/ PMHx Down Syndrome, nonverbal at baseline, Hypothyroidism, Cerebral palsy and Seizure Disorders presents to the ED from nursing facility/group home ([facility]) presented to ED for respiratory distress and high grade fever. Patient found to be septic on admission. As per isaias Savage at bedside, patient had been coughing and congested for 3x days prior to admission. Denies fevers, chills, n/v/d or pain prior to admission. Patient is on a puree diet at baseline.    Vitals: Temp 104.5F (rectal), /74, , RR 24, SpO2 100% on BiPAP  Labs: Hgb 9.5 (previously 11.1), Platelet 422, INR 1.43, VBG Lactate 2.1  Imaging: CXR shows possible RML infiltrate    In the ED:  - s/p Cefepime 2g IV x1  - s/p Levaquin 750mg IV x1  - s/p 2L LR bolus    Admitted for management of acute respiratory distress 2/2 CAP/Aspiration PNA (20 Jan 2024 18:22)    ----------------------------------------------------  FLOOR COURSE  Initially stable on the floor pending discharge once nutritional status was improved; patient requiring NGT feeding per SLP secondary to dysphagia, and failing FEES. Plan was to repeat SLP evaluation and if failing to consider PEG placement. Patient was received persistently tachycardic EKG NSR initially afebrile no evidence of DVT being treated with metoprolol, however on 1/31 spiked fever triggering septic work up, patient found congested, likely aspiration, required NRB/Highflow/Venti mask, started meropenem, upgraded to SDU for respiratory status monitoring.     STEPDOWN COURSE  Pt was made DNR/DNI ([place]); oxygenation status went from HFNC--->NC, tx'ed with danna and caspofungin  plan is to complete caspo until 3/10 and if no improvement then change care to CMO as approved by consumer advisory board emilio  [facility]    # Sepsis POA / Acute hypoxic resp failure / RSV bronchiolitis /  H/o Dysphagia/ suspected aspiration    - taper off  high flow oxygen   - clinically improving,  off pressors   - continue midodrine 20 Q8H  - continue caspo, danna until 3/10 per ID   - Failed FEES,  NG tube for feedings and medications, patient might  need PEG tube once 02 requirements decrease ( in case if level of care is still the same, consumer advisory board agreed for CMO ( paperwork in the the paper chart)   - c/w duoneb, chest PT, aspiration precautions   - pulmonary is following  - aggressive chest PT with vest, aspiration precautions and suction     # Conjunctivitis   - start erythromycin ointment both eyes     #  Elevated Troponin , type 2 MI/  Sinus tachycardia  - c/w telemetry monitoring   - Repeat EKG: Normal sinus rhythm  - c/w  metoprolol  - TTE 2/19 normal EF no DD or valve abnormalities    # Seizure Disorder  - c/w  Keppra   - seizure precautions  - keep Mg above 2.0     # H/o lower ext DVT   - c/w therapeutic Lovenox, Eliquis on dc    #  Hypothyroidism  - TSH 0.51    # Normocytic Anemia, anemia of chronic disease   - monitor H/H, keep Hb above 7.5     # Down Syndrome/  Cerebral Palsy/ functional quadriplegia   - supportive care  - prevent falls and aspiration   - failed speech and swallow, needs PEG as above  - on Raloxifene     Palliative followed up ,  pt is [facility], paperwork singed on 2/29  by palliative care attending and myself, DNR/DNI was approved by [facility], will c/w caspofungin and Abx to complete the course, if there is no improvement will change level of care to CMO ( change in level of care was approved by Consumer Advisory Board [place] [employer], paperwork in the paper chart)     #Progress Note Handoff  Pending (specify):  taper off high flow oxygen, supportive care, aggressive pulmonary toilet, chest pt, aspiration precautions, , c/w treatment with Abx and Caspofungin until 3/10, if there is no improvement will change the level of care ( see above) to CMO, start erythromycin ointment for conjunctivitis.   Family discussion: n/a, spoke with an aid   Disposition: DGTF          MEDICATIONS:  acetylcysteine 20%  Inhalation 4 milliLiter(s) Inhalation every 6 hours  albuterol/ipratropium for Nebulization 3 milliLiter(s) Nebulizer every 6 hours  aluminum hydroxide/magnesium hydroxide/simethicone Suspension 30 milliLiter(s) Oral every 4 hours PRN  AQUAPHOR (petrolatum Ointment) 1 Application(s) Topical two times a day  artificial  tears Solution 1 Drop(s) Both EYES two times a day  calcium carbonate   1250 mG (OsCal) 1 Tablet(s) Oral two times a day  caspofungin IVPB      caspofungin IVPB 50 milliGRAM(s) IV Intermittent every 24 hours  chlorhexidine 2% Cloths 1 Application(s) Topical daily  chlorhexidine 2% Cloths 1 Application(s) Topical daily  enoxaparin Injectable 50 milliGRAM(s) SubCutaneous every 12 hours  erythromycin   Ointment 1 Application(s) Both EYES daily  gabapentin 300 milliGRAM(s) Oral every 12 hours  levETIRAcetam  IVPB 500 milliGRAM(s) IV Intermittent two times a day  melatonin 3 milliGRAM(s) Oral at bedtime  midodrine. 20 milliGRAM(s) Oral three times a day  ondansetron Injectable 4 milliGRAM(s) IV Push every 8 hours PRN  pantoprazole  Injectable 40 milliGRAM(s) IV Push every 24 hours  polyethylene glycol 3350 17 Gram(s) Oral at bedtime  raloxifene 60 milliGRAM(s) Oral daily  saccharomyces boulardii 250 milliGRAM(s) Oral two times a day  senna 2 Tablet(s) Oral at bedtime  sodium chloride 0.65% Nasal 2 Spray(s) Both Nostrils three times a day  sodium chloride 0.9% Bolus 500 milliLiter(s) IV Bolus once      T(C): 36.4 (03-04-24 @ 11:45), Max: 36.9 (03-04-24 @ 00:41)  HR: 89 (03-04-24 @ 11:45) (67 - 100)  BP: 119/61 (03-04-24 @ 11:45) (82/52 - 120/62)  RR: 20 (03-04-24 @ 11:45) (16 - 20)  SpO2: 97% (03-04-24 @ 13:45) (96% - 99%)  Wt(kg): --Vital Signs Last 24 Hrs  T(C): 36.4 (04 Mar 2024 11:45), Max: 36.9 (04 Mar 2024 00:41)  T(F): 97.6 (04 Mar 2024 11:45), Max: 98.5 (04 Mar 2024 00:41)  HR: 89 (04 Mar 2024 11:45) (67 - 100)  BP: 119/61 (04 Mar 2024 11:45) (82/52 - 120/62)  BP(mean): 84 (04 Mar 2024 11:45) (83 - 84)  RR: 20 (04 Mar 2024 11:45) (16 - 20)  SpO2: 97% (04 Mar 2024 13:45) (96% - 99%)    Parameters below as of 04 Mar 2024 13:45  Patient On (Oxygen Delivery Method): nasal cannula  O2 Flow (L/min): 6      PHYSICAL EXAM:  GENERAL: NAD, well-groomed, well-developed  HEAD:  NG tube in place Atraumatic, Normocephalic  EYES: EOMI, PERRLA, conjunctiva and sclera clear  ENMT:  Moist mucous membranes  NECK: Supple, No JVD,  CHEST/LUNG: Clear to auscultation bilaterally; No rales, rhonchi, wheezing, or rubs  HEART: Regular rate and rhythm; No murmurs, rubs, or gallops  ABDOMEN: Soft, Nontender, Nondistended; Bowel sounds present  NEURO: responsive to voice  EXTREMITIES: No LE edema, no calf tenderness  LYMPH: No lymphadenopathy noted  SKIN: No rashes or lesions        LABS:                        8.3    6.06  )-----------( 554      ( 03 Mar 2024 07:44 )             27.7     03-03    138  |  98  |  12  ----------------------------<  103<H>  4.4   |  32  |  0.5<L>    Ca    9.1      03 Mar 2024 07:44  Mg     2.4     03-03    TPro  6.7  /  Alb  2.8<L>  /  TBili  <0.2  /  DBili  x   /  AST  16  /  ALT  11  /  AlkPhos  90  03-03      Urinalysis Basic - ( 03 Mar 2024 07:44 )    Color: x / Appearance: x / SG: x / pH: x  Gluc: 103 mg/dL / Ketone: x  / Bili: x / Urobili: x   Blood: x / Protein: x / Nitrite: x   Leuk Esterase: x / RBC: x / WBC x   Sq Epi: x / Non Sq Epi: x / Bacteria: x      CAPILLARY BLOOD GLUCOSE      POCT Blood Glucose.: 144 mg/dL (04 Mar 2024 11:28)  POCT Blood Glucose.: 131 mg/dL (04 Mar 2024 05:49)  POCT Blood Glucose.: 89 mg/dL (03 Mar 2024 21:39)        Urinalysis Basic - ( 03 Mar 2024 07:44 )    Color: x / Appearance: x / SG: x / pH: x  Gluc: 103 mg/dL / Ketone: x  / Bili: x / Urobili: x   Blood: x / Protein: x / Nitrite: x   Leuk Esterase: x / RBC: x / WBC x   Sq Epi: x / Non Sq Epi: x / Bacteria: x        RADIOLOGY & ADDITIONAL TESTS:    Imaging Personally Reviewed:  [x ] YES  [ ] NO

## 2024-03-05 LAB
ALBUMIN SERPL ELPH-MCNC: 2.8 G/DL — LOW (ref 3.5–5.2)
ALP SERPL-CCNC: 97 U/L — SIGNIFICANT CHANGE UP (ref 30–115)
ALT FLD-CCNC: 10 U/L — SIGNIFICANT CHANGE UP (ref 0–41)
ANION GAP SERPL CALC-SCNC: 11 MMOL/L — SIGNIFICANT CHANGE UP (ref 7–14)
AST SERPL-CCNC: 16 U/L — SIGNIFICANT CHANGE UP (ref 0–41)
BASOPHILS # BLD AUTO: 0.08 K/UL — SIGNIFICANT CHANGE UP (ref 0–0.2)
BASOPHILS NFR BLD AUTO: 0.7 % — SIGNIFICANT CHANGE UP (ref 0–1)
BILIRUB SERPL-MCNC: 0.2 MG/DL — SIGNIFICANT CHANGE UP (ref 0.2–1.2)
BUN SERPL-MCNC: 15 MG/DL — SIGNIFICANT CHANGE UP (ref 10–20)
CALCIUM SERPL-MCNC: 9.2 MG/DL — SIGNIFICANT CHANGE UP (ref 8.4–10.5)
CHLORIDE SERPL-SCNC: 101 MMOL/L — SIGNIFICANT CHANGE UP (ref 98–110)
CO2 SERPL-SCNC: 30 MMOL/L — SIGNIFICANT CHANGE UP (ref 17–32)
CREAT SERPL-MCNC: <0.5 MG/DL — LOW (ref 0.7–1.5)
EGFR: 115 ML/MIN/1.73M2 — SIGNIFICANT CHANGE UP
EOSINOPHIL # BLD AUTO: 0.09 K/UL — SIGNIFICANT CHANGE UP (ref 0–0.7)
EOSINOPHIL NFR BLD AUTO: 0.7 % — SIGNIFICANT CHANGE UP (ref 0–8)
GLUCOSE BLDC GLUCOMTR-MCNC: 108 MG/DL — HIGH (ref 70–99)
GLUCOSE BLDC GLUCOMTR-MCNC: 119 MG/DL — HIGH (ref 70–99)
GLUCOSE BLDC GLUCOMTR-MCNC: 120 MG/DL — HIGH (ref 70–99)
GLUCOSE BLDC GLUCOMTR-MCNC: 121 MG/DL — HIGH (ref 70–99)
GLUCOSE BLDC GLUCOMTR-MCNC: 128 MG/DL — HIGH (ref 70–99)
GLUCOSE BLDC GLUCOMTR-MCNC: 56 MG/DL — LOW (ref 70–99)
GLUCOSE SERPL-MCNC: 114 MG/DL — HIGH (ref 70–99)
HCT VFR BLD CALC: 28 % — LOW (ref 37–47)
HGB BLD-MCNC: 8.3 G/DL — LOW (ref 12–16)
IMM GRANULOCYTES NFR BLD AUTO: 0.5 % — HIGH (ref 0.1–0.3)
LYMPHOCYTES # BLD AUTO: 19.7 % — LOW (ref 20.5–51.1)
LYMPHOCYTES # BLD AUTO: 2.37 K/UL — SIGNIFICANT CHANGE UP (ref 1.2–3.4)
MAGNESIUM SERPL-MCNC: 2.3 MG/DL — SIGNIFICANT CHANGE UP (ref 1.8–2.4)
MCHC RBC-ENTMCNC: 23.1 PG — LOW (ref 27–31)
MCHC RBC-ENTMCNC: 29.6 G/DL — LOW (ref 32–37)
MCV RBC AUTO: 78 FL — LOW (ref 81–99)
MONOCYTES # BLD AUTO: 0.73 K/UL — HIGH (ref 0.1–0.6)
MONOCYTES NFR BLD AUTO: 6.1 % — SIGNIFICANT CHANGE UP (ref 1.7–9.3)
NEUTROPHILS # BLD AUTO: 8.68 K/UL — HIGH (ref 1.4–6.5)
NEUTROPHILS NFR BLD AUTO: 72.3 % — SIGNIFICANT CHANGE UP (ref 42.2–75.2)
NRBC # BLD: 0 /100 WBCS — SIGNIFICANT CHANGE UP (ref 0–0)
PLATELET # BLD AUTO: 516 K/UL — HIGH (ref 130–400)
PMV BLD: 9.5 FL — SIGNIFICANT CHANGE UP (ref 7.4–10.4)
POTASSIUM SERPL-MCNC: 4.8 MMOL/L — SIGNIFICANT CHANGE UP (ref 3.5–5)
POTASSIUM SERPL-SCNC: 4.8 MMOL/L — SIGNIFICANT CHANGE UP (ref 3.5–5)
PROT SERPL-MCNC: 7 G/DL — SIGNIFICANT CHANGE UP (ref 6–8)
RBC # BLD: 3.59 M/UL — LOW (ref 4.2–5.4)
RBC # FLD: 20.9 % — HIGH (ref 11.5–14.5)
SODIUM SERPL-SCNC: 142 MMOL/L — SIGNIFICANT CHANGE UP (ref 135–146)
WBC # BLD: 12.01 K/UL — HIGH (ref 4.8–10.8)
WBC # FLD AUTO: 12.01 K/UL — HIGH (ref 4.8–10.8)

## 2024-03-05 PROCEDURE — 71045 X-RAY EXAM CHEST 1 VIEW: CPT | Mod: 26

## 2024-03-05 PROCEDURE — 99233 SBSQ HOSP IP/OBS HIGH 50: CPT

## 2024-03-05 RX ORDER — MEROPENEM 1 G/30ML
1000 INJECTION INTRAVENOUS EVERY 8 HOURS
Refills: 0 | Status: DISCONTINUED | OUTPATIENT
Start: 2024-03-05 | End: 2024-03-11

## 2024-03-05 RX ADMIN — CHLORHEXIDINE GLUCONATE 1 APPLICATION(S): 213 SOLUTION TOPICAL at 12:52

## 2024-03-05 RX ADMIN — GABAPENTIN 300 MILLIGRAM(S): 400 CAPSULE ORAL at 05:07

## 2024-03-05 RX ADMIN — Medication 1 APPLICATION(S): at 05:22

## 2024-03-05 RX ADMIN — Medication 1 DROP(S): at 05:08

## 2024-03-05 RX ADMIN — MEROPENEM 100 MILLIGRAM(S): 1 INJECTION INTRAVENOUS at 14:46

## 2024-03-05 RX ADMIN — CASPOFUNGIN ACETATE 260 MILLIGRAM(S): 7 INJECTION, POWDER, LYOPHILIZED, FOR SOLUTION INTRAVENOUS at 12:52

## 2024-03-05 RX ADMIN — ENOXAPARIN SODIUM 50 MILLIGRAM(S): 100 INJECTION SUBCUTANEOUS at 18:20

## 2024-03-05 RX ADMIN — Medication 4 MILLILITER(S): at 09:17

## 2024-03-05 RX ADMIN — PANTOPRAZOLE SODIUM 40 MILLIGRAM(S): 20 TABLET, DELAYED RELEASE ORAL at 04:54

## 2024-03-05 RX ADMIN — LEVETIRACETAM 400 MILLIGRAM(S): 250 TABLET, FILM COATED ORAL at 18:21

## 2024-03-05 RX ADMIN — Medication 3 MILLILITER(S): at 13:30

## 2024-03-05 RX ADMIN — Medication 1 APPLICATION(S): at 18:19

## 2024-03-05 RX ADMIN — Medication 2 SPRAY(S): at 14:46

## 2024-03-05 RX ADMIN — ENOXAPARIN SODIUM 50 MILLIGRAM(S): 100 INJECTION SUBCUTANEOUS at 05:07

## 2024-03-05 RX ADMIN — Medication 1 APPLICATION(S): at 12:52

## 2024-03-05 RX ADMIN — Medication 4 MILLILITER(S): at 13:31

## 2024-03-05 RX ADMIN — Medication 3 MILLILITER(S): at 19:47

## 2024-03-05 RX ADMIN — Medication 250 MILLIGRAM(S): at 18:23

## 2024-03-05 RX ADMIN — MIDODRINE HYDROCHLORIDE 20 MILLIGRAM(S): 2.5 TABLET ORAL at 18:22

## 2024-03-05 RX ADMIN — Medication 4 MILLILITER(S): at 19:47

## 2024-03-05 RX ADMIN — GABAPENTIN 300 MILLIGRAM(S): 400 CAPSULE ORAL at 18:26

## 2024-03-05 RX ADMIN — Medication 1 TABLET(S): at 05:08

## 2024-03-05 RX ADMIN — Medication 3 MILLILITER(S): at 09:16

## 2024-03-05 RX ADMIN — MIDODRINE HYDROCHLORIDE 20 MILLIGRAM(S): 2.5 TABLET ORAL at 12:53

## 2024-03-05 RX ADMIN — RALOXIFENE HYDROCHLORIDE 60 MILLIGRAM(S): 60 TABLET, COATED ORAL at 12:53

## 2024-03-05 RX ADMIN — Medication 2 SPRAY(S): at 05:07

## 2024-03-05 RX ADMIN — Medication 250 MILLIGRAM(S): at 05:08

## 2024-03-05 RX ADMIN — MIDODRINE HYDROCHLORIDE 20 MILLIGRAM(S): 2.5 TABLET ORAL at 05:07

## 2024-03-05 RX ADMIN — Medication 1 TABLET(S): at 18:20

## 2024-03-05 RX ADMIN — Medication 1 DROP(S): at 18:24

## 2024-03-05 RX ADMIN — LEVETIRACETAM 400 MILLIGRAM(S): 250 TABLET, FILM COATED ORAL at 05:05

## 2024-03-05 NOTE — PROGRESS NOTE ADULT - SUBJECTIVE AND OBJECTIVE BOX
24H events:    Patient is a 58y old Female who presents with a chief complaint of Respiratory Distress (05 Mar 2024 11:59)    Primary diagnosis of Sepsis with acute hypoxic respiratory failure      Today is 45d of hospitalization. This morning patient was seen and examined at bedside, resting comfortably in bed.    No acute or major events overnight.    Code Status:      PAST MEDICAL & SURGICAL HISTORY  Down syndrome    Osteoporosis    Mild anemia    Neuropathy    S/P debridement  of R hip on 3/2/21      SOCIAL HISTORY:  Social History:      ALLERGIES:  No Known Allergies    MEDICATIONS:  STANDING MEDICATIONS  acetylcysteine 20%  Inhalation 4 milliLiter(s) Inhalation every 6 hours  albuterol/ipratropium for Nebulization 3 milliLiter(s) Nebulizer every 6 hours  AQUAPHOR (petrolatum Ointment) 1 Application(s) Topical two times a day  artificial  tears Solution 1 Drop(s) Both EYES two times a day  calcium carbonate   1250 mG (OsCal) 1 Tablet(s) Oral two times a day  caspofungin IVPB      caspofungin IVPB 50 milliGRAM(s) IV Intermittent every 24 hours  chlorhexidine 2% Cloths 1 Application(s) Topical daily  chlorhexidine 2% Cloths 1 Application(s) Topical daily  enoxaparin Injectable 50 milliGRAM(s) SubCutaneous every 12 hours  erythromycin   Ointment 1 Application(s) Both EYES daily  gabapentin 300 milliGRAM(s) Oral every 12 hours  levETIRAcetam  IVPB 500 milliGRAM(s) IV Intermittent two times a day  melatonin 3 milliGRAM(s) Oral at bedtime  meropenem  IVPB 1000 milliGRAM(s) IV Intermittent every 8 hours  midodrine. 20 milliGRAM(s) Oral three times a day  pantoprazole  Injectable 40 milliGRAM(s) IV Push every 24 hours  polyethylene glycol 3350 17 Gram(s) Oral at bedtime  raloxifene 60 milliGRAM(s) Oral daily  saccharomyces boulardii 250 milliGRAM(s) Oral two times a day  senna 2 Tablet(s) Oral at bedtime  sodium chloride 0.65% Nasal 2 Spray(s) Both Nostrils three times a day  sodium chloride 0.9% Bolus 500 milliLiter(s) IV Bolus once    PRN MEDICATIONS  aluminum hydroxide/magnesium hydroxide/simethicone Suspension 30 milliLiter(s) Oral every 4 hours PRN  ondansetron Injectable 4 milliGRAM(s) IV Push every 8 hours PRN    VITALS:   T(F): 98.9  HR: 108  BP: 100/59  RR: 20  SpO2: 92%    PHYSICAL EXAM:  GENERAL:   ( x) NAD, lying in bed comfortably     (  ) obtunded     (  ) lethargic     (  ) somnolent    HEAD:   ( x) Atraumatic     (  ) hematoma     (  ) laceration (specify location:       )     NECK:  (x) Supple     (  ) neck stiffness     (  ) nuchal rigidity     (  )  no JVD     (  ) JVD present ( -- cm)    HEART:  Rate -->     (x) normal rate     (  ) bradycardic     (  ) tachycardic  Rhythm -->     (x) regular     (  ) regularly irregular     (  ) irregularly irregular  Murmurs -->     (x) normal s1s2     (  ) systolic murmur     (  ) diastolic murmur     (  ) continuous murmur      (  ) S3 present     (  ) S4 present    LUNGS:   ( x)Unlabored respirations     (  ) tachypnea  ( x) B/L air entry     (  ) decreased breath sounds in:  (location     )    ( x) no adventitious sound     (  ) crackles     (  ) wheezing      (  ) rhonchi      (specify location:       )  (  ) chest wall tenderness (specify location:       )    ABDOMEN:   ( x) Soft     (  ) tense   |   (  ) nondistended     (  ) distended   |   (  ) +BS     (  ) hypoactive bowel sounds     (  ) hyperactive bowel sounds  ( x) nontender     (  ) RUQ tenderness     (  ) RLQ tenderness     (  ) LLQ tenderness     (  ) epigastric tenderness     (  ) diffuse tenderness  (  ) Splenomegaly      (  ) Hepatomegaly      (  ) Jaundice     (  ) ecchymosis     EXTREMITIES:  ( x) Normal     (  ) Rash     (  ) ecchymosis     (  ) varicose veins      (  ) pitting edema     (  ) non-pitting edema   (  ) ulceration     (  ) gangrene:     (location:     )    NERVOUS SYSTEM: nonverbal, opens eyes to voice  ( ) A&Ox3     (  ) confused     (  ) lethargic  CN II-XII:     ( x) Intact     (  ) deficits found     (Specify:     )   Upper extremities:     (  ) no sensorimotor deficits     (  ) weakness     (  ) loss of proprioception/vibration     (  ) loss of touch/temperature (specify:    )  Lower extremities:     (  ) no sensorimotor deficits     (  ) weakness     (  ) loss of proprioception/vibration     (  ) loss of touch/temperature (specify:    )    SKIN:   (  ) No rashes or lesions     (  ) maculopapular rash     (  ) pustules     (  ) vesicles     (  ) ulcer     (  ) ecchymosis     (specify location:     )    AMPAC score :    (  ) Indwelling Madsen Catheter:   Date insterted:    Reason (  ) Critical illness     (  ) urinary retention    (  ) Accurate Ins/Outs Monitoring     (  ) CMO patient    (  ) Central Line:   Date inserted:  Location: (  ) Right IJ     (  ) Left IJ     (  ) Right Fem     (  ) Left Fem    (  ) SPC        (  ) pigtail       (  ) PEG tube       (  ) colostomy       (  ) jejunostomy  (  ) U-Dall    LABS:                        8.3    12.01 )-----------( 516      ( 05 Mar 2024 05:27 )             28.0     03-05    142  |  101  |  15  ----------------------------<  114<H>  4.8   |  30  |  <0.5<L>    Ca    9.2      05 Mar 2024 05:27  Mg     2.3     03-05    TPro  7.0  /  Alb  2.8<L>  /  TBili  0.2  /  DBili  x   /  AST  16  /  ALT  10  /  AlkPhos  97  03-05      Urinalysis Basic - ( 05 Mar 2024 05:27 )    Color: x / Appearance: x / SG: x / pH: x  Gluc: 114 mg/dL / Ketone: x  / Bili: x / Urobili: x   Blood: x / Protein: x / Nitrite: x   Leuk Esterase: x / RBC: x / WBC x   Sq Epi: x / Non Sq Epi: x / Bacteria: x                RADIOLOGY:    ASSESSMENT AND PLAN  TEA ECHAVARRIA is a [id number] Female with a history significant for Down Syndrome, nonverbal at baseline, Hypothyroidism, Cerebral palsy and Seizure Disorders presents to the ED [facility] presented to ED for respiratory distress and high grade fever. Patient found to be septic on admission. Admitted to SDU for management of acute respiratory distress 2/2 CAP/Aspiration PNA (20 Jan 2024 18:22)  While pt was on the floor she developed dyspnea after swallow evaluation, was spiking high grade fever, consulted by pulmonary/critical care and was upgraded back to SDU.      A/P  # Sepsis POA / Acute hypoxic resp failure / RSV bronchiolitis /  H/o Dysphagia/ suspected aspiration    -  pt was taper off  high flow oxygen , on NC now   - clinically improving,  off pressors   - continue midodrine 20 Q8H  - continue caspo, danna until 3/10 per ID   - Failed FEES,  NG tube for feedings and medications, patient might  need PEG tube once 02 requirements decrease ( in case if level of care is still the same or in case of clinical improvement) otherwise   consumer advisory board agreed for CMO ( paperwork in the the paper chart)   - c/w duoneb, chest PT, aspiration precautions   - pulmonary is following  - aggressive chest PT with vest, aspiration precautions and suction     # Conjunctivitis / Leukocytosis   - on  erythromycin ointment both eyes   - leukocytosis is not significant, monitor WBC, pt is afebrile     #  Elevated Troponin , type 2 MI/  Sinus tachycardia  - Repeat EKG: Normal sinus rhythm  - c/w  metoprolol  - TTE 2/19 normal EF no DD or valve abnormalities    # Seizure Disorder  - c/w  Keppra   - seizure precautions  - keep Mg above 2.0     # H/o lower ext DVT   - c/w therapeutic Lovenox, Eliquis on dc    #  Hypothyroidism  - TSH 0.51    # Normocytic Anemia, anemia of chronic disease   - monitor H/H, keep Hb above 7.5     # Down Syndrome/  Cerebral Palsy/ functional quadriplegia   - supportive care  - prevent falls and aspiration   - failed speech and swallow, might  need  PEG ( see above)  - on Raloxifene     Palliative followed up ,  pt is [facility], paperwork singed on 2/29  by palliative care attending and myself, DNR/DNI was approved by [facility], will c/w caspofungin and Abx to complete the course, if there is no improvement will change level of care to CMO ( change in level of care was approved by Consumer Advisory Board [place] [employer], paperwork in the paper chart)     #Progress Note Handoff  Pending (specify):  , supportive care, aggressive pulmonary toilet, chest pt, aspiration precautions, , c/w Meropenem and  Caspofungin until 3/10, if there is no improvement will change the level of care ( see above) to CMO, in case of clinical improvement reach out to consumer advisory board to consent  for PEG,  trend WBC, monitor temperature curve.   Disposition: DGTF

## 2024-03-05 NOTE — PROGRESS NOTE ADULT - ASSESSMENT
58F w/ PMHx Down Syndrome, nonverbal at baseline, Hypothyroidism, Cerebral palsy and Seizure Disorders presents to the ED from nursing facility/group home ([facility]) presented to ED for respiratory distress and high grade fever. Patient found to be septic on admission. Admitted to SDU for management of acute respiratory distress 2/2 CAP/Aspiration PNA (20 Jan 2024 18:22)  While pt was on the floor she developed dyspnea after swallow evaluation, was spiking high grade fever, consulted by pulmonary/critical care and was upgraded back to SDU.      A/P  # Sepsis POA / Acute hypoxic resp failure / RSV bronchiolitis /  H/o Dysphagia/ suspected aspiration    -  pt was taper off  high flow oxygen , on NC now   - clinically improving,  off pressors   - continue midodrine 20 Q8H  - continue caspo, danna until 3/10 per ID   - Failed FEES,  NG tube for feedings and medications, patient might  need PEG tube once 02 requirements decrease ( in case if level of care is still the same or in case of clinical improvement) otherwise   consumer advisory board agreed for CMO ( paperwork in the the paper chart)   - c/w duoneb, chest PT, aspiration precautions   - pulmonary is following  - aggressive chest PT with vest, aspiration precautions and suction     # Conjunctivitis / Leukocytosis   - on  erythromycin ointment both eyes   - leukocytosis is not significant, monitor WBC, pt is afebrile     #  Elevated Troponin , type 2 MI/  Sinus tachycardia  - Repeat EKG: Normal sinus rhythm  - c/w  metoprolol  - TTE 2/19 normal EF no DD or valve abnormalities    # Seizure Disorder  - c/w  Keppra   - seizure precautions  - keep Mg above 2.0     # H/o lower ext DVT   - c/w therapeutic Lovenox, Eliquis on dc    #  Hypothyroidism  - TSH 0.51    # Normocytic Anemia, anemia of chronic disease   - monitor H/H, keep Hb above 7.5     # Down Syndrome/  Cerebral Palsy/ functional quadriplegia   - supportive care  - prevent falls and aspiration   - failed speech and swallow, might  need  PEG ( see above)  - on Raloxifene     Palliative followed up ,  [facility], paperwork singed on 2/29  by palliative care attending and myself, DNR/DNI was approved by [facility], will c/w caspofungin and Abx to complete the course, if there is no improvement will change level of care to CMO ( change in level of care was approved by Consumer Advisory Board [place] [employer], paperwork in the paper chart)     #Progress Note Handoff  Pending (specify):  , supportive care, aggressive pulmonary toilet, chest pt, aspiration precautions, , c/w Meropenem and  Caspofungin until 3/10, if there is no improvement will change the level of care ( see above) to CMO, in case of clinical improvement reach out to consumer advisory board to consent  for PEG,  trend WBC, monitor temperature curve.   Family discussion: n/a, spoke with an aid   Disposition: DGTF

## 2024-03-05 NOTE — PROGRESS NOTE ADULT - ASSESSMENT
IMPRESSION:    Acute hypoxemic respiratory failure on HHFNC/ NRM  PNA s/p ABx  Sepsis POA  Anemia  Septic shock, off Levophed   Recurrent aspiration pneumonia / prior intubation  Elevated fungitell - on Caspo  HO DVT on Eliquis   HO GI bleed  HO OM  HO recent duodenal perforation   HO polymicrobial bacteremia   H/o CP, DS  H/o seizures    PLAN:    CNS:  Avoid CNS Depressant, AED. MS at baseline.    HEENT: Oral care. Eye drops.    PULMONARY: HOB at 45 degrees.  Aspiration precaution. Wean FiO2 Keep spo2 92 TO 96%. CHEST PT. Nebs q6 .    CARDIOVASCULAR: Keep MAP more than 60. Avoid overload. C/w midodrine    GI: Protonix. NG feeding. Consent for PEG    INFECTIOUS DISEASE:  ABX and Anti fungal per ID.     HEMATOLOGICAL:  Lovenox therapeutic.  Monitor CBC.    ENDOCRINE:  Follow up FS.  Insulin protocol if needed.    MUSCULOSKELETAL: Bedrest.  Off loading.  Wound care.    Prognosis very poor.    Palliative care following  FLOOR  dnr/i  IMPRESSION:    Acute hypoxemic respiratory failure on HHFNC/ NRM  PNA s/p ABx  Sepsis POA  Anemia  Septic shock, off Levophed   Recurrent aspiration pneumonia / prior intubation  Elevated fungitell - on Caspo  HO DVT on Eliquis   HO GI bleed  HO OM  HO recent duodenal perforation   HO polymicrobial bacteremia   H/o CP, DS  H/o seizures    PLAN:    CNS:  Avoid CNS Depressant, AED. MS at baseline.    HEENT: Oral care. Eye drops.    PULMONARY: HOB at 45 degrees.  Aspiration precaution. Wean FiO2 Keep spo2 92 TO 96%. CHEST PT. Nebs q6 .    CARDIOVASCULAR: Keep MAP more than 60. Avoid overload. C/w midodrine.    GI: Protonix. NG feeding. Consent for PEG    INFECTIOUS DISEASE:  ABX and Anti fungal per ID.     HEMATOLOGICAL:  Lovenox therapeutic.  Monitor CBC.    ENDOCRINE:  Follow up FS.  Insulin protocol if needed.    MUSCULOSKELETAL: Bedrest.  Off loading.  Wound care.    Prognosis very poor.    Palliative care following  FLOOR  dnr/i

## 2024-03-05 NOTE — PROGRESS NOTE ADULT - SUBJECTIVE AND OBJECTIVE BOX
58F w/ PMHx Down Syndrome, nonverbal at baseline, Hypothyroidism, Cerebral palsy and Seizure Disorders presents to the ED from nursing facility/group home ([facility]) presented to ED for respiratory distress and high grade fever. Patient found to be septic on admission.Admitted to SDU for management of acute respiratory distress 2/2 CAP/Aspiration PNA (20 Jan 2024 18:22)  While pt was on the floor she developed dyspnea after swallow evaluation, was spiking high grade fever, consulted by pulmonary/critical care and was upgraded back to SDU.  Pt developed left lung white out, improved after aggressive chest PT, treated with Meropenem and caspofungin, ID is following, her overall prognosis is very poor, she might need trach and PEG in the nearest future , a meeting with facility staff and state took place on 2/14.   Pt completed course o Meropenem, Levofloxacin added on 2/15 ( pt spiked fever), Meropenem resumed on 2/18,  she was tapered off pressure support, requires  high flow oxygen fo a while in SDU  Hb dropped without acute blood loss, will  monitor closely ( pt is o full AC). Now pt is off pressure support, transitioned to NC, downgraded to the floor.   Today pt is awake, nonverbal, calm.    PAST MEDICAL & SURGICAL HISTORY:  Down syndrome  Osteoporosis  Mild anemia  Neuropathy  S/P debridement  of R hip on 3/2/21      Vital Signs:  T(C): 37.2 (05 Mar 2024 11:39), Max: 37.6 (04 Mar 2024 16:00)  T(F): 98.9 (05 Mar 2024 11:39), Max: 99.6 (04 Mar 2024 16:00)  HR: 108 (05 Mar 2024 11:39) (67 - 108)  BP: 100/59 (05 Mar 2024 11:39) (100/59 - 122/64)  BP(mean): 70 (05 Mar 2024 11:39) (63 - 77)  RR: 20 (05 Mar 2024 11:39) (20 - 20)  SpO2: 92% (05 Mar 2024 11:39) (92% - 100%)    Parameters below as of 05 Mar 2024 11:39  Patient On (Oxygen Delivery Method): nasal cannula          PHYSICAL EXAM:  GENERAL:  NAD chronically ill appearing, mentally challenged   SKIN: No rashes or lesions  HEENT: Atraumatic. Normocephalic   NECK: Supple, No JVD.    PULMONARY: decreased breath sounds B/L, poor air entry   CVS: Normal S1, S2. Rate and Rhythm are regular.    ABDOMEN/GI: Soft, Nontender, Nondistended.  MSK:  contracted LE   NEUROLOGIC: does not follow commands, opens eyes spontaneously   PSYCH: Alert & oriented x 0    Consultant(s) Notes Reviewed:  [x ] YES  [ ] NO  Care Discussed with Consultants/Other Providers [ x] YES  [ ] NO    LABS:                                       8.3    12.01 )-----------( 516      ( 05 Mar 2024 05:27 )             28.0   03-05    142  |  101  |  15  ----------------------------<  114<H>  4.8   |  30  |  <0.5<L>    Ca    9.2      05 Mar 2024 05:27  Mg     2.3     03-05    TPro  7.0  /  Alb  2.8<L>  /  TBili  0.2  /  DBili  x   /  AST  16  /  ALT  10  /  AlkPhos  97  03-05      Culture - Blood (collected 25 Feb 2024 11:47)  Source: .Blood None  Preliminary Report (26 Feb 2024 20:02):    No growth at 24 hours    RADIOLOGY & ADDITIONAL TESTS:  Imaging or report Personally Reviewed:  [x] YES  [ ] NO  EKG reviewed: [x] YES  [ ] NO    < from: Xray Chest 1 View- PORTABLE-Routine (02.28.24 @ 05:32) >    Impression:    Bilateral opacifications, left greater than right, stable. Support   devices as described.    < end of copied text >  < from: CT Chest w/ IV Cont (02.21.24 @ 00:09) >  Impression:    Bilateral pneumonia, left worse than right, with a left upper lobe   thick-walled cavity.    Enteric catheter as described. Note that by the time that this report was   dictated, this issue had been tended to..    < end of copied text >  < from: CT Abdomen and Pelvis w/ IV Cont (02.21.24 @ 00:10) >    IMPRESSION:  1.  No CT evidence of an acuteabdominopelvic pathology.    See separately dictated CT chest report for intrathoracic findings.    < end of copied text >  < from: Xray Chest 1 View- PORTABLE-Routine (03.01.24 @ 06:58) >  impression:    EKG leads overlie thorax, obscuring anatomy.    Support devices: NG tube coursing below the left hemidiaphragm. The tip   is not included..    Cardiac/ Mediastinum: Stable    Lungs/ Pleura: Unchanged left greater than right opacities. No   pneumothorax    Skeletal/ soft tissues: Stable      < from: Xray Chest 1 View- PORTABLE-Routine (03.03.24 @ 06:11) >    IMPRESSION:    Unchanged left-sided opacities.      < from: Xray Chest 1 View- PORTABLE-Routine (03.04.24 @ 06:01) >  Impression:    Bilateral opacifications. Support devices as described.    Stable.    < end of copied text >        MEDICATIONS  (STANDING):  acetylcysteine 20%  Inhalation 4 milliLiter(s) Inhalation every 6 hours  albuterol/ipratropium for Nebulization 3 milliLiter(s) Nebulizer every 6 hours  AQUAPHOR (petrolatum Ointment) 1 Application(s) Topical two times a day  artificial  tears Solution 1 Drop(s) Both EYES two times a day  calcium carbonate   1250 mG (OsCal) 1 Tablet(s) Oral two times a day  caspofungin IVPB 50 milliGRAM(s) IV Intermittent every 24 hours  caspofungin IVPB      chlorhexidine 2% Cloths 1 Application(s) Topical daily  chlorhexidine 2% Cloths 1 Application(s) Topical daily  enoxaparin Injectable 50 milliGRAM(s) SubCutaneous every 12 hours  erythromycin   Ointment 1 Application(s) Both EYES daily  gabapentin 300 milliGRAM(s) Oral every 12 hours  levETIRAcetam  IVPB 500 milliGRAM(s) IV Intermittent two times a day  melatonin 3 milliGRAM(s) Oral at bedtime  midodrine. 20 milliGRAM(s) Oral three times a day  pantoprazole  Injectable 40 milliGRAM(s) IV Push every 24 hours  polyethylene glycol 3350 17 Gram(s) Oral at bedtime  raloxifene 60 milliGRAM(s) Oral daily  saccharomyces boulardii 250 milliGRAM(s) Oral two times a day  senna 2 Tablet(s) Oral at bedtime  sodium chloride 0.65% Nasal 2 Spray(s) Both Nostrils three times a day  sodium chloride 0.9% Bolus 500 milliLiter(s) IV Bolus once    MEDICATIONS  (PRN):  aluminum hydroxide/magnesium hydroxide/simethicone Suspension 30 milliLiter(s) Oral every 4 hours PRN Dyspepsia  ondansetron Injectable 4 milliGRAM(s) IV Push every 8 hours PRN Nausea and/or Vomiting

## 2024-03-05 NOTE — PROGRESS NOTE ADULT - SUBJECTIVE AND OBJECTIVE BOX
Patient is a 58y old  Female who presents with a chief complaint of Respiratory Distress (04 Mar 2024 15:00)        Over Night Events:        ROS:     All ROS are negative except HPI         PHYSICAL EXAM    ICU Vital Signs Last 24 Hrs  T(C): 37.1 (05 Mar 2024 04:00), Max: 37.6 (04 Mar 2024 16:00)  T(F): 98.7 (05 Mar 2024 04:00), Max: 99.6 (04 Mar 2024 16:00)  HR: 107 (05 Mar 2024 04:00) (67 - 107)  BP: 117/68 (05 Mar 2024 04:00) (103/57 - 122/64)  BP(mean): 63 (04 Mar 2024 20:00) (63 - 84)  ABP: --  ABP(mean): --  RR: 20 (05 Mar 2024 04:00) (20 - 20)  SpO2: 99% (05 Mar 2024 04:00) (96% - 100%)    O2 Parameters below as of 04 Mar 2024 20:00  Patient On (Oxygen Delivery Method): nasal cannula  O2 Flow (L/min): 6          ENT: Airway patent,  EYES: Pupils equal, Round and reactive to light.  CARDIAC: Normal rate, Regular rhythm.    RESPIRATORY: No wheezing, Bilateral crackles, Not tachypneic, No use of accessory muscles  GASTROINTESTINAL: Abdomen soft, Non-tender, No guarding,   NEUROLOGICAL: Arousable.   SKIN: Skin normal color for race, Warm and dry and intact.   Heel ulcer  butt blisters      03-04-24 @ 07:01  -  03-05-24 @ 07:00  --------------------------------------------------------  IN:    Enteral Tube Flush: 150 mL    Jevity 1.2: 300 mL  Total IN: 450 mL    OUT:    Voided (mL): 1200 mL  Total OUT: 1200 mL    Total NET: -750 mL          LABS:                            8.3    12.01 )-----------( 516      ( 05 Mar 2024 05:27 )             28.0                                               03-05    142  |  101  |  15  ----------------------------<  114<H>  4.8   |  30  |  <0.5<L>    Ca    9.2      05 Mar 2024 05:27  Mg     2.3     03-05    TPro  7.0  /  Alb  2.8<L>  /  TBili  0.2  /  DBili  x   /  AST  16  /  ALT  10  /  AlkPhos  97  03-05                                             Urinalysis Basic - ( 05 Mar 2024 05:27 )    Color: x / Appearance: x / SG: x / pH: x  Gluc: 114 mg/dL / Ketone: x  / Bili: x / Urobili: x   Blood: x / Protein: x / Nitrite: x   Leuk Esterase: x / RBC: x / WBC x   Sq Epi: x / Non Sq Epi: x / Bacteria: x                                                  LIVER FUNCTIONS - ( 05 Mar 2024 05:27 )  Alb: 2.8 g/dL / Pro: 7.0 g/dL / ALK PHOS: 97 U/L / ALT: 10 U/L / AST: 16 U/L / GGT: x                                                                                                                                       MEDICATIONS  (STANDING):  acetylcysteine 20%  Inhalation 4 milliLiter(s) Inhalation every 6 hours  albuterol/ipratropium for Nebulization 3 milliLiter(s) Nebulizer every 6 hours  AQUAPHOR (petrolatum Ointment) 1 Application(s) Topical two times a day  artificial  tears Solution 1 Drop(s) Both EYES two times a day  calcium carbonate   1250 mG (OsCal) 1 Tablet(s) Oral two times a day  caspofungin IVPB 50 milliGRAM(s) IV Intermittent every 24 hours  caspofungin IVPB      chlorhexidine 2% Cloths 1 Application(s) Topical daily  chlorhexidine 2% Cloths 1 Application(s) Topical daily  enoxaparin Injectable 50 milliGRAM(s) SubCutaneous every 12 hours  erythromycin   Ointment 1 Application(s) Both EYES daily  gabapentin 300 milliGRAM(s) Oral every 12 hours  levETIRAcetam  IVPB 500 milliGRAM(s) IV Intermittent two times a day  melatonin 3 milliGRAM(s) Oral at bedtime  midodrine. 20 milliGRAM(s) Oral three times a day  pantoprazole  Injectable 40 milliGRAM(s) IV Push every 24 hours  polyethylene glycol 3350 17 Gram(s) Oral at bedtime  raloxifene 60 milliGRAM(s) Oral daily  saccharomyces boulardii 250 milliGRAM(s) Oral two times a day  senna 2 Tablet(s) Oral at bedtime  sodium chloride 0.65% Nasal 2 Spray(s) Both Nostrils three times a day  sodium chloride 0.9% Bolus 500 milliLiter(s) IV Bolus once    MEDICATIONS  (PRN):  aluminum hydroxide/magnesium hydroxide/simethicone Suspension 30 milliLiter(s) Oral every 4 hours PRN Dyspepsia  ondansetron Injectable 4 milliGRAM(s) IV Push every 8 hours PRN Nausea and/or Vomiting         Patient is a 58y old  Female who presents with a chief complaint of Respiratory Distress (04 Mar 2024 15:00)        Over Night Events:  five L NC. sating 98.       ROS:     All ROS are negative except HPI         PHYSICAL EXAM    ICU Vital Signs Last 24 Hrs  T(C): 37.1 (05 Mar 2024 04:00), Max: 37.6 (04 Mar 2024 16:00)  T(F): 98.7 (05 Mar 2024 04:00), Max: 99.6 (04 Mar 2024 16:00)  HR: 107 (05 Mar 2024 04:00) (67 - 107)  BP: 117/68 (05 Mar 2024 04:00) (103/57 - 122/64)  BP(mean): 63 (04 Mar 2024 20:00) (63 - 84)  RR: 20 (05 Mar 2024 04:00) (20 - 20)  SpO2: 99% (05 Mar 2024 04:00) (96% - 100%)    O2 Parameters below as of 04 Mar 2024 20:00  Patient On (Oxygen Delivery Method): nasal cannula  O2 Flow (L/min): 6        ENT: Airway patent,  EYES: Pupils equal, Round and reactive to light.  CARDIAC: Normal rate, Regular rhythm.    RESPIRATORY: No wheezing, Bilateral crackles, Not tachypneic, No use of accessory muscles  GASTROINTESTINAL: Abdomen soft, Non-tender, No guarding,   NEUROLOGICAL: Arousable.   SKIN: Skin normal color for race, Warm and dry and intact.   Heel ulcer  butt blisters      03-04-24 @ 07:01  -  03-05-24 @ 07:00  --------------------------------------------------------  IN:    Enteral Tube Flush: 150 mL    Jevity 1.2: 300 mL  Total IN: 450 mL    OUT:    Voided (mL): 1200 mL  Total OUT: 1200 mL    Total NET: -750 mL          LABS:                            8.3    12.01 )-----------( 516      ( 05 Mar 2024 05:27 )             28.0                                               03-05    142  |  101  |  15  ----------------------------<  114<H>  4.8   |  30  |  <0.5<L>    Ca    9.2      05 Mar 2024 05:27  Mg     2.3     03-05    TPro  7.0  /  Alb  2.8<L>  /  TBili  0.2  /  DBili  x   /  AST  16  /  ALT  10  /  AlkPhos  97  03-05                                             Urinalysis Basic - ( 05 Mar 2024 05:27 )    Color: x / Appearance: x / SG: x / pH: x  Gluc: 114 mg/dL / Ketone: x  / Bili: x / Urobili: x   Blood: x / Protein: x / Nitrite: x   Leuk Esterase: x / RBC: x / WBC x   Sq Epi: x / Non Sq Epi: x / Bacteria: x                                                  LIVER FUNCTIONS - ( 05 Mar 2024 05:27 )  Alb: 2.8 g/dL / Pro: 7.0 g/dL / ALK PHOS: 97 U/L / ALT: 10 U/L / AST: 16 U/L / GGT: x                                                                                                                                       MEDICATIONS  (STANDING):  acetylcysteine 20%  Inhalation 4 milliLiter(s) Inhalation every 6 hours  albuterol/ipratropium for Nebulization 3 milliLiter(s) Nebulizer every 6 hours  AQUAPHOR (petrolatum Ointment) 1 Application(s) Topical two times a day  artificial  tears Solution 1 Drop(s) Both EYES two times a day  calcium carbonate   1250 mG (OsCal) 1 Tablet(s) Oral two times a day  caspofungin IVPB 50 milliGRAM(s) IV Intermittent every 24 hours  caspofungin IVPB      chlorhexidine 2% Cloths 1 Application(s) Topical daily  chlorhexidine 2% Cloths 1 Application(s) Topical daily  enoxaparin Injectable 50 milliGRAM(s) SubCutaneous every 12 hours  erythromycin   Ointment 1 Application(s) Both EYES daily  gabapentin 300 milliGRAM(s) Oral every 12 hours  levETIRAcetam  IVPB 500 milliGRAM(s) IV Intermittent two times a day  melatonin 3 milliGRAM(s) Oral at bedtime  midodrine. 20 milliGRAM(s) Oral three times a day  pantoprazole  Injectable 40 milliGRAM(s) IV Push every 24 hours  polyethylene glycol 3350 17 Gram(s) Oral at bedtime  raloxifene 60 milliGRAM(s) Oral daily  saccharomyces boulardii 250 milliGRAM(s) Oral two times a day  senna 2 Tablet(s) Oral at bedtime  sodium chloride 0.65% Nasal 2 Spray(s) Both Nostrils three times a day  sodium chloride 0.9% Bolus 500 milliLiter(s) IV Bolus once    MEDICATIONS  (PRN):  aluminum hydroxide/magnesium hydroxide/simethicone Suspension 30 milliLiter(s) Oral every 4 hours PRN Dyspepsia  ondansetron Injectable 4 milliGRAM(s) IV Push every 8 hours PRN Nausea and/or Vomiting         Patient is a 58y old  Female who presents with a chief complaint of Respiratory Distress (04 Mar 2024 15:00)        Over Night Events:  ON NC O2.   Off pressors.  Sating 98.       ROS:     All ROS are negative except HPI         PHYSICAL EXAM    ICU Vital Signs Last 24 Hrs  T(C): 37.1 (05 Mar 2024 04:00), Max: 37.6 (04 Mar 2024 16:00)  T(F): 98.7 (05 Mar 2024 04:00), Max: 99.6 (04 Mar 2024 16:00)  HR: 107 (05 Mar 2024 04:00) (67 - 107)  BP: 117/68 (05 Mar 2024 04:00) (103/57 - 122/64)  BP(mean): 63 (04 Mar 2024 20:00) (63 - 84)  RR: 20 (05 Mar 2024 04:00) (20 - 20)  SpO2: 99% (05 Mar 2024 04:00) (96% - 100%)    O2 Parameters below as of 04 Mar 2024 20:00  Patient On (Oxygen Delivery Method): nasal cannula  O2 Flow (L/min): 6        ENT: Airway patent,  EYES: Pupils equal, Round and reactive to light.  CARDIAC: Normal rate, Regular rhythm.    RESPIRATORY: No wheezing, Bilateral crackles, Not tachypneic, No use of accessory muscles  GASTROINTESTINAL: Abdomen soft, Non-tender, No guarding,   NEUROLOGICAL: Arousable.   SKIN: Skin normal color for race, Warm and dry and intact.   Heel ulcer  butt blisters      03-04-24 @ 07:01  -  03-05-24 @ 07:00  --------------------------------------------------------  IN:    Enteral Tube Flush: 150 mL    Jevity 1.2: 300 mL  Total IN: 450 mL    OUT:    Voided (mL): 1200 mL  Total OUT: 1200 mL    Total NET: -750 mL          LABS:                            8.3    12.01 )-----------( 516      ( 05 Mar 2024 05:27 )             28.0                                               03-05    142  |  101  |  15  ----------------------------<  114<H>  4.8   |  30  |  <0.5<L>    Ca    9.2      05 Mar 2024 05:27  Mg     2.3     03-05    TPro  7.0  /  Alb  2.8<L>  /  TBili  0.2  /  DBili  x   /  AST  16  /  ALT  10  /  AlkPhos  97  03-05                                             Urinalysis Basic - ( 05 Mar 2024 05:27 )    Color: x / Appearance: x / SG: x / pH: x  Gluc: 114 mg/dL / Ketone: x  / Bili: x / Urobili: x   Blood: x / Protein: x / Nitrite: x   Leuk Esterase: x / RBC: x / WBC x   Sq Epi: x / Non Sq Epi: x / Bacteria: x                                                  LIVER FUNCTIONS - ( 05 Mar 2024 05:27 )  Alb: 2.8 g/dL / Pro: 7.0 g/dL / ALK PHOS: 97 U/L / ALT: 10 U/L / AST: 16 U/L / GGT: x                                                                                                                                       MEDICATIONS  (STANDING):  acetylcysteine 20%  Inhalation 4 milliLiter(s) Inhalation every 6 hours  albuterol/ipratropium for Nebulization 3 milliLiter(s) Nebulizer every 6 hours  AQUAPHOR (petrolatum Ointment) 1 Application(s) Topical two times a day  artificial  tears Solution 1 Drop(s) Both EYES two times a day  calcium carbonate   1250 mG (OsCal) 1 Tablet(s) Oral two times a day  caspofungin IVPB 50 milliGRAM(s) IV Intermittent every 24 hours  caspofungin IVPB      chlorhexidine 2% Cloths 1 Application(s) Topical daily  chlorhexidine 2% Cloths 1 Application(s) Topical daily  enoxaparin Injectable 50 milliGRAM(s) SubCutaneous every 12 hours  erythromycin   Ointment 1 Application(s) Both EYES daily  gabapentin 300 milliGRAM(s) Oral every 12 hours  levETIRAcetam  IVPB 500 milliGRAM(s) IV Intermittent two times a day  melatonin 3 milliGRAM(s) Oral at bedtime  midodrine. 20 milliGRAM(s) Oral three times a day  pantoprazole  Injectable 40 milliGRAM(s) IV Push every 24 hours  polyethylene glycol 3350 17 Gram(s) Oral at bedtime  raloxifene 60 milliGRAM(s) Oral daily  saccharomyces boulardii 250 milliGRAM(s) Oral two times a day  senna 2 Tablet(s) Oral at bedtime  sodium chloride 0.65% Nasal 2 Spray(s) Both Nostrils three times a day  sodium chloride 0.9% Bolus 500 milliLiter(s) IV Bolus once    MEDICATIONS  (PRN):  aluminum hydroxide/magnesium hydroxide/simethicone Suspension 30 milliLiter(s) Oral every 4 hours PRN Dyspepsia  ondansetron Injectable 4 milliGRAM(s) IV Push every 8 hours PRN Nausea and/or Vomiting

## 2024-03-06 DIAGNOSIS — B33.8 OTHER SPECIFIED VIRAL DISEASES: ICD-10-CM

## 2024-03-06 DIAGNOSIS — J85.2 ABSCESS OF LUNG WITHOUT PNEUMONIA: ICD-10-CM

## 2024-03-06 DIAGNOSIS — G40.909 EPILEPSY, UNSPECIFIED, NOT INTRACTABLE, WITHOUT STATUS EPILEPTICUS: ICD-10-CM

## 2024-03-06 DIAGNOSIS — J96.01 ACUTE RESPIRATORY FAILURE WITH HYPOXIA: ICD-10-CM

## 2024-03-06 DIAGNOSIS — Q90.9 DOWN SYNDROME, UNSPECIFIED: ICD-10-CM

## 2024-03-06 LAB
ALBUMIN SERPL ELPH-MCNC: 2.7 G/DL — LOW (ref 3.5–5.2)
ALP SERPL-CCNC: 91 U/L — SIGNIFICANT CHANGE UP (ref 30–115)
ALT FLD-CCNC: 10 U/L — SIGNIFICANT CHANGE UP (ref 0–41)
ANION GAP SERPL CALC-SCNC: 9 MMOL/L — SIGNIFICANT CHANGE UP (ref 7–14)
AST SERPL-CCNC: 13 U/L — SIGNIFICANT CHANGE UP (ref 0–41)
BASOPHILS # BLD AUTO: 0.08 K/UL — SIGNIFICANT CHANGE UP (ref 0–0.2)
BASOPHILS NFR BLD AUTO: 0.9 % — SIGNIFICANT CHANGE UP (ref 0–1)
BILIRUB SERPL-MCNC: 0.2 MG/DL — SIGNIFICANT CHANGE UP (ref 0.2–1.2)
BUN SERPL-MCNC: 15 MG/DL — SIGNIFICANT CHANGE UP (ref 10–20)
CALCIUM SERPL-MCNC: 8.9 MG/DL — SIGNIFICANT CHANGE UP (ref 8.4–10.5)
CHLORIDE SERPL-SCNC: 103 MMOL/L — SIGNIFICANT CHANGE UP (ref 98–110)
CO2 SERPL-SCNC: 30 MMOL/L — SIGNIFICANT CHANGE UP (ref 17–32)
CREAT SERPL-MCNC: <0.5 MG/DL — LOW (ref 0.7–1.5)
EGFR: 115 ML/MIN/1.73M2 — SIGNIFICANT CHANGE UP
EOSINOPHIL # BLD AUTO: 0.35 K/UL — SIGNIFICANT CHANGE UP (ref 0–0.7)
EOSINOPHIL NFR BLD AUTO: 4 % — SIGNIFICANT CHANGE UP (ref 0–8)
GLUCOSE BLDC GLUCOMTR-MCNC: 125 MG/DL — HIGH (ref 70–99)
GLUCOSE SERPL-MCNC: 133 MG/DL — HIGH (ref 70–99)
HCT VFR BLD CALC: 26.2 % — LOW (ref 37–47)
HGB BLD-MCNC: 7.8 G/DL — LOW (ref 12–16)
IMM GRANULOCYTES NFR BLD AUTO: 0.9 % — HIGH (ref 0.1–0.3)
LYMPHOCYTES # BLD AUTO: 2.76 K/UL — SIGNIFICANT CHANGE UP (ref 1.2–3.4)
LYMPHOCYTES # BLD AUTO: 31.5 % — SIGNIFICANT CHANGE UP (ref 20.5–51.1)
MAGNESIUM SERPL-MCNC: 2.2 MG/DL — SIGNIFICANT CHANGE UP (ref 1.8–2.4)
MCHC RBC-ENTMCNC: 23.4 PG — LOW (ref 27–31)
MCHC RBC-ENTMCNC: 29.8 G/DL — LOW (ref 32–37)
MCV RBC AUTO: 78.7 FL — LOW (ref 81–99)
MONOCYTES # BLD AUTO: 0.53 K/UL — SIGNIFICANT CHANGE UP (ref 0.1–0.6)
MONOCYTES NFR BLD AUTO: 6.1 % — SIGNIFICANT CHANGE UP (ref 1.7–9.3)
NEUTROPHILS # BLD AUTO: 4.95 K/UL — SIGNIFICANT CHANGE UP (ref 1.4–6.5)
NEUTROPHILS NFR BLD AUTO: 56.6 % — SIGNIFICANT CHANGE UP (ref 42.2–75.2)
NRBC # BLD: 0 /100 WBCS — SIGNIFICANT CHANGE UP (ref 0–0)
PLATELET # BLD AUTO: 500 K/UL — HIGH (ref 130–400)
PMV BLD: 9.8 FL — SIGNIFICANT CHANGE UP (ref 7.4–10.4)
POTASSIUM SERPL-MCNC: 4.6 MMOL/L — SIGNIFICANT CHANGE UP (ref 3.5–5)
POTASSIUM SERPL-SCNC: 4.6 MMOL/L — SIGNIFICANT CHANGE UP (ref 3.5–5)
PROT SERPL-MCNC: 6.5 G/DL — SIGNIFICANT CHANGE UP (ref 6–8)
RBC # BLD: 3.33 M/UL — LOW (ref 4.2–5.4)
RBC # FLD: 21 % — HIGH (ref 11.5–14.5)
SODIUM SERPL-SCNC: 142 MMOL/L — SIGNIFICANT CHANGE UP (ref 135–146)
WBC # BLD: 8.75 K/UL — SIGNIFICANT CHANGE UP (ref 4.8–10.8)
WBC # FLD AUTO: 8.75 K/UL — SIGNIFICANT CHANGE UP (ref 4.8–10.8)

## 2024-03-06 PROCEDURE — 71045 X-RAY EXAM CHEST 1 VIEW: CPT | Mod: 26,77

## 2024-03-06 PROCEDURE — 99232 SBSQ HOSP IP/OBS MODERATE 35: CPT

## 2024-03-06 PROCEDURE — 71045 X-RAY EXAM CHEST 1 VIEW: CPT | Mod: 26

## 2024-03-06 RX ADMIN — Medication 3 MILLILITER(S): at 14:02

## 2024-03-06 RX ADMIN — SENNA PLUS 2 TABLET(S): 8.6 TABLET ORAL at 21:49

## 2024-03-06 RX ADMIN — GABAPENTIN 300 MILLIGRAM(S): 400 CAPSULE ORAL at 05:38

## 2024-03-06 RX ADMIN — Medication 1 APPLICATION(S): at 05:39

## 2024-03-06 RX ADMIN — MIDODRINE HYDROCHLORIDE 20 MILLIGRAM(S): 2.5 TABLET ORAL at 17:26

## 2024-03-06 RX ADMIN — Medication 250 MILLIGRAM(S): at 17:27

## 2024-03-06 RX ADMIN — CHLORHEXIDINE GLUCONATE 1 APPLICATION(S): 213 SOLUTION TOPICAL at 12:35

## 2024-03-06 RX ADMIN — Medication 2 SPRAY(S): at 05:39

## 2024-03-06 RX ADMIN — Medication 4 MILLILITER(S): at 20:05

## 2024-03-06 RX ADMIN — ENOXAPARIN SODIUM 50 MILLIGRAM(S): 100 INJECTION SUBCUTANEOUS at 17:28

## 2024-03-06 RX ADMIN — Medication 1 TABLET(S): at 17:26

## 2024-03-06 RX ADMIN — RALOXIFENE HYDROCHLORIDE 60 MILLIGRAM(S): 60 TABLET, COATED ORAL at 12:36

## 2024-03-06 RX ADMIN — LEVETIRACETAM 400 MILLIGRAM(S): 250 TABLET, FILM COATED ORAL at 17:28

## 2024-03-06 RX ADMIN — Medication 1 APPLICATION(S): at 12:36

## 2024-03-06 RX ADMIN — GABAPENTIN 300 MILLIGRAM(S): 400 CAPSULE ORAL at 17:26

## 2024-03-06 RX ADMIN — Medication 250 MILLIGRAM(S): at 05:38

## 2024-03-06 RX ADMIN — SENNA PLUS 2 TABLET(S): 8.6 TABLET ORAL at 00:36

## 2024-03-06 RX ADMIN — Medication 3 MILLIGRAM(S): at 00:37

## 2024-03-06 RX ADMIN — Medication 3 MILLILITER(S): at 08:42

## 2024-03-06 RX ADMIN — Medication 1 TABLET(S): at 05:40

## 2024-03-06 RX ADMIN — Medication 1 DROP(S): at 17:27

## 2024-03-06 RX ADMIN — POLYETHYLENE GLYCOL 3350 17 GRAM(S): 17 POWDER, FOR SOLUTION ORAL at 21:50

## 2024-03-06 RX ADMIN — Medication 1 DROP(S): at 06:06

## 2024-03-06 RX ADMIN — Medication 2 SPRAY(S): at 00:37

## 2024-03-06 RX ADMIN — Medication 3 MILLIGRAM(S): at 21:50

## 2024-03-06 RX ADMIN — MEROPENEM 100 MILLIGRAM(S): 1 INJECTION INTRAVENOUS at 00:35

## 2024-03-06 RX ADMIN — ENOXAPARIN SODIUM 50 MILLIGRAM(S): 100 INJECTION SUBCUTANEOUS at 05:37

## 2024-03-06 RX ADMIN — MIDODRINE HYDROCHLORIDE 20 MILLIGRAM(S): 2.5 TABLET ORAL at 05:38

## 2024-03-06 RX ADMIN — Medication 1 APPLICATION(S): at 17:25

## 2024-03-06 RX ADMIN — Medication 2 SPRAY(S): at 12:36

## 2024-03-06 RX ADMIN — CASPOFUNGIN ACETATE 260 MILLIGRAM(S): 7 INJECTION, POWDER, LYOPHILIZED, FOR SOLUTION INTRAVENOUS at 12:34

## 2024-03-06 RX ADMIN — Medication 2 SPRAY(S): at 21:50

## 2024-03-06 RX ADMIN — MEROPENEM 100 MILLIGRAM(S): 1 INJECTION INTRAVENOUS at 05:38

## 2024-03-06 RX ADMIN — PANTOPRAZOLE SODIUM 40 MILLIGRAM(S): 20 TABLET, DELAYED RELEASE ORAL at 05:38

## 2024-03-06 RX ADMIN — POLYETHYLENE GLYCOL 3350 17 GRAM(S): 17 POWDER, FOR SOLUTION ORAL at 00:36

## 2024-03-06 RX ADMIN — MEROPENEM 100 MILLIGRAM(S): 1 INJECTION INTRAVENOUS at 23:52

## 2024-03-06 RX ADMIN — LEVETIRACETAM 400 MILLIGRAM(S): 250 TABLET, FILM COATED ORAL at 06:19

## 2024-03-06 RX ADMIN — Medication 4 MILLILITER(S): at 08:43

## 2024-03-06 RX ADMIN — MEROPENEM 100 MILLIGRAM(S): 1 INJECTION INTRAVENOUS at 12:37

## 2024-03-06 RX ADMIN — MIDODRINE HYDROCHLORIDE 20 MILLIGRAM(S): 2.5 TABLET ORAL at 12:36

## 2024-03-06 RX ADMIN — Medication 3 MILLILITER(S): at 20:05

## 2024-03-06 NOTE — PROGRESS NOTE ADULT - ASSESSMENT
Patient was evaluated at bedside with [employer]est[employer] on morning rounds.    - c/w current antibiotics per ID    ***Incomplete, full notation to follow 58F w/ Down Syndrome/Cerebral Palsy/Intellectual Disability(Non-verbal at baseline), Seizure Disorder, DVT admitted for Sepsis w/ Acute Respiratory Failure w/ Hypoxia 2/2 Pneumonia w/ Abscess & RSV.    #Acute Respiratory Failure w/ Hypoxia  #Lung Abscess  #RSV  #Sepsis  #Seizure Disorder  #Down Syndrome    - c/w course of Meropenem and Caspofungin per ID through 3/10  - continues to require midodrine 20 TID w/ relative hypotension still present  - per chart, patient is DNR/DNI (completed by 2 physician signature 2/29) and per chart if there is no improvement will change level of care to CMO ( change in level of care was approved by [employer] [place] [employer], paperwork in the paper chart)   - c/w keppra for seizure disorder  - continues to require NGT for feeds    #Progress Note Handoff  Pending (specify):  continue to monitor response to treatment, if declining then per [facility] there is [facility] approval for CMO  Family discussion: house staff updated pt family  [facility]: [facility]

## 2024-03-06 NOTE — PROGRESS NOTE ADULT - SUBJECTIVE AND OBJECTIVE BOX
CC: 58F admit for Acute Respiratory Failure 2/2 RSV/Aspiration Pneumonia    SUBJECTIVE / OVERNIGHT EVENTS:  No acute events overnight. Patient seen and evaluated at bedside. Patient unable to provide history due to underlying intellectual disability (per aide at bedside, this is baseline)    ROS:  no reported vomiting    MEDICATIONS  (STANDING):  acetylcysteine 20%  Inhalation 4 milliLiter(s) Inhalation every 6 hours  albuterol/ipratropium for Nebulization 3 milliLiter(s) Nebulizer every 6 hours  AQUAPHOR (petrolatum Ointment) 1 Application(s) Topical two times a day  artificial  tears Solution 1 Drop(s) Both EYES two times a day  calcium carbonate   1250 mG (OsCal) 1 Tablet(s) Oral two times a day  caspofungin IVPB      caspofungin IVPB 50 milliGRAM(s) IV Intermittent every 24 hours  chlorhexidine 2% Cloths 1 Application(s) Topical daily  chlorhexidine 2% Cloths 1 Application(s) Topical daily  enoxaparin Injectable 50 milliGRAM(s) SubCutaneous every 12 hours  erythromycin   Ointment 1 Application(s) Both EYES daily  gabapentin 300 milliGRAM(s) Oral every 12 hours  levETIRAcetam  IVPB 500 milliGRAM(s) IV Intermittent two times a day  melatonin 3 milliGRAM(s) Oral at bedtime  meropenem  IVPB 1000 milliGRAM(s) IV Intermittent every 8 hours  midodrine. 20 milliGRAM(s) Oral three times a day  pantoprazole  Injectable 40 milliGRAM(s) IV Push every 24 hours  polyethylene glycol 3350 17 Gram(s) Oral at bedtime  raloxifene 60 milliGRAM(s) Oral daily  saccharomyces boulardii 250 milliGRAM(s) Oral two times a day  senna 2 Tablet(s) Oral at bedtime  sodium chloride 0.65% Nasal 2 Spray(s) Both Nostrils three times a day  sodium chloride 0.9% Bolus 500 milliLiter(s) IV Bolus once    MEDICATIONS  (PRN):  aluminum hydroxide/magnesium hydroxide/simethicone Suspension 30 milliLiter(s) Oral every 4 hours PRN Dyspepsia  ondansetron Injectable 4 milliGRAM(s) IV Push every 8 hours PRN Nausea and/or Vomiting      CAPILLARY BLOOD GLUCOSE      POCT Blood Glucose.: 133 mg/dL (06 Mar 2024 11:59)  POCT Blood Glucose.: 125 mg/dL (06 Mar 2024 06:55)  POCT Blood Glucose.: 128 mg/dL (05 Mar 2024 23:32)  POCT Blood Glucose.: 108 mg/dL (05 Mar 2024 17:53)  POCT Blood Glucose.: 56 mg/dL (05 Mar 2024 16:23)    I&O's Summary    05 Mar 2024 07:01  -  06 Mar 2024 07:00  --------------------------------------------------------  IN: 1335 mL / OUT: 0 mL / NET: 1335 mL    06 Mar 2024 07:01  -  06 Mar 2024 14:23  --------------------------------------------------------  IN: 450 mL / OUT: 0 mL / NET: 450 mL        Physical Exam  Vital Signs Last 24 Hrs  T(C): 36.7 (06 Mar 2024 07:13), Max: 37.2 (05 Mar 2024 20:00)  T(F): 98 (06 Mar 2024 07:13), Max: 99 (05 Mar 2024 20:00)  HR: 88 (06 Mar 2024 07:13) (88 - 103)  BP: 98/60 (06 Mar 2024 07:13) (94/51 - 102/58)  RR: 18 (06 Mar 2024 07:13) (18 - 20)  SpO2: 98% (05 Mar 2024 21:46) (98% - 100%)    Parameters below as of 05 Mar 2024 20:00  Patient On (Oxygen Delivery Method): nasal cannula        GENERAL: NAD  HEAD:  Atraumatic, Normocephalic  Lung: normal work of breathing, cta b/l  Cardiovascular: S1&S2+, rrr, no m/r/g appreciated  ABDOMEN: soft, nt  SKIN: warm and dry, no visible purulence in exposed areas    LABS:                        7.8    8.75  )-----------( 500      ( 06 Mar 2024 08:12 )             26.2     03-06    142  |  103  |  15  ----------------------------<  133<H>  4.6   |  30  |  <0.5<L>    Ca    8.9      06 Mar 2024 08:12  Mg     2.2     03-06    TPro  6.5  /  Alb  2.7<L>  /  TBili  0.2  /  DBili  x   /  AST  13  /  ALT  10  /  AlkPhos  91  03-06          Urinalysis Basic - ( 06 Mar 2024 08:12 )    Color: x / Appearance: x / SG: x / pH: x  Gluc: 133 mg/dL / Ketone: x  / Bili: x / Urobili: x   Blood: x / Protein: x / Nitrite: x   Leuk Esterase: x / RBC: x / WBC x   Sq Epi: x / Non Sq Epi: x / Bacteria: x          RADIOLOGY & ADDITIONAL TESTS:  Results Reviewed:   Imaging Personally Reviewed:  Electrocardiogram Personally Reviewed:    COORDINATION OF CARE:  Care Discussed with Consultants/Other Providers [Y/N]:  Prior or Outpatient Records Reviewed [Y/N]:   CC: 58F admit for Acute Respiratory Failure 2/2 RSV/Aspiration Pneumonia    SUBJECTIVE / OVERNIGHT EVENTS:  No acute events overnight. Patient seen and evaluated at bedside. Patient unable to provide history due to underlying intellectual disability (per aide at bedside, this is baseline)    ROS:  no reported vomiting    MEDICATIONS  (STANDING):  acetylcysteine 20%  Inhalation 4 milliLiter(s) Inhalation every 6 hours  albuterol/ipratropium for Nebulization 3 milliLiter(s) Nebulizer every 6 hours  AQUAPHOR (petrolatum Ointment) 1 Application(s) Topical two times a day  artificial  tears Solution 1 Drop(s) Both EYES two times a day  calcium carbonate   1250 mG (OsCal) 1 Tablet(s) Oral two times a day  caspofungin IVPB      caspofungin IVPB 50 milliGRAM(s) IV Intermittent every 24 hours  chlorhexidine 2% Cloths 1 Application(s) Topical daily  chlorhexidine 2% Cloths 1 Application(s) Topical daily  enoxaparin Injectable 50 milliGRAM(s) SubCutaneous every 12 hours  erythromycin   Ointment 1 Application(s) Both EYES daily  gabapentin 300 milliGRAM(s) Oral every 12 hours  levETIRAcetam  IVPB 500 milliGRAM(s) IV Intermittent two times a day  melatonin 3 milliGRAM(s) Oral at bedtime  meropenem  IVPB 1000 milliGRAM(s) IV Intermittent every 8 hours  midodrine. 20 milliGRAM(s) Oral three times a day  pantoprazole  Injectable 40 milliGRAM(s) IV Push every 24 hours  polyethylene glycol 3350 17 Gram(s) Oral at bedtime  raloxifene 60 milliGRAM(s) Oral daily  saccharomyces boulardii 250 milliGRAM(s) Oral two times a day  senna 2 Tablet(s) Oral at bedtime  sodium chloride 0.65% Nasal 2 Spray(s) Both Nostrils three times a day  sodium chloride 0.9% Bolus 500 milliLiter(s) IV Bolus once    MEDICATIONS  (PRN):  aluminum hydroxide/magnesium hydroxide/simethicone Suspension 30 milliLiter(s) Oral every 4 hours PRN Dyspepsia  ondansetron Injectable 4 milliGRAM(s) IV Push every 8 hours PRN Nausea and/or Vomiting    CAPILLARY BLOOD GLUCOSE  POCT Blood Glucose.: 133 mg/dL (06 Mar 2024 11:59)  POCT Blood Glucose.: 125 mg/dL (06 Mar 2024 06:55)  POCT Blood Glucose.: 128 mg/dL (05 Mar 2024 23:32)  POCT Blood Glucose.: 108 mg/dL (05 Mar 2024 17:53)  POCT Blood Glucose.: 56 mg/dL (05 Mar 2024 16:23)    I&O's Summary  05 Mar 2024 07:01  -  06 Mar 2024 07:00  --------------------------------------------------------  IN: 1335 mL / OUT: 0 mL / NET: 1335 mL    06 Mar 2024 07:01  -  06 Mar 2024 14:23  --------------------------------------------------------  IN: 450 mL / OUT: 0 mL / NET: 450 mL    Physical Exam  Vital Signs Last 24 Hrs  T(C): 36.7 (06 Mar 2024 07:13), Max: 37.2 (05 Mar 2024 20:00)  T(F): 98 (06 Mar 2024 07:13), Max: 99 (05 Mar 2024 20:00)  HR: 88 (06 Mar 2024 07:13) (88 - 103)  BP: 98/60 (06 Mar 2024 07:13) (94/51 - 102/58)  RR: 18 (06 Mar 2024 07:13) (18 - 20)  SpO2: 98% (05 Mar 2024 21:46) (98% - 100%)    Parameters below as of 05 Mar 2024 20:00  Patient On (Oxygen Delivery Method): nasal cannula        GENERAL: NAD  HEAD:  Atraumatic, Normocephalic  Lung: normal work of breathing, cta b/l  Cardiovascular: S1&S2+, rrr, no m/r/g appreciated  ABDOMEN: soft, nt  SKIN: warm and dry, no visible purulence in exposed areas    LABS:                        7.8    8.75  )-----------( 500      ( 06 Mar 2024 08:12 )             26.2     03-06    142  |  103  |  15  ----------------------------<  133<H>  4.6   |  30  |  <0.5<L>    Ca    8.9      06 Mar 2024 08:12  Mg     2.2     03-06    TPro  6.5  /  Alb  2.7<L>  /  TBili  0.2  /  DBili  x   /  AST  13  /  ALT  10  /  AlkPhos  91  03-06          Urinalysis Basic - ( 06 Mar 2024 08:12 )    Color: x / Appearance: x / SG: x / pH: x  Gluc: 133 mg/dL / Ketone: x  / Bili: x / Urobili: x   Blood: x / Protein: x / Nitrite: x   Leuk Esterase: x / RBC: x / WBC x   Sq Epi: x / Non Sq Epi: x / Bacteria: x          RADIOLOGY & ADDITIONAL TESTS:  Results Reviewed:   Imaging Personally Reviewed:  Electrocardiogram Personally Reviewed:    COORDINATION OF CARE:  Care Discussed with Consultants/Other Providers [Y/N]:  Prior or Outpatient Records Reviewed [Y/N]:

## 2024-03-07 DIAGNOSIS — J18.9 PNEUMONIA, UNSPECIFIED ORGANISM: ICD-10-CM

## 2024-03-07 LAB
ALBUMIN SERPL ELPH-MCNC: 3 G/DL — LOW (ref 3.5–5.2)
ALP SERPL-CCNC: 101 U/L — SIGNIFICANT CHANGE UP (ref 30–115)
ALT FLD-CCNC: 10 U/L — SIGNIFICANT CHANGE UP (ref 0–41)
ANION GAP SERPL CALC-SCNC: 11 MMOL/L — SIGNIFICANT CHANGE UP (ref 7–14)
AST SERPL-CCNC: 13 U/L — SIGNIFICANT CHANGE UP (ref 0–41)
BASOPHILS # BLD AUTO: 0.09 K/UL — SIGNIFICANT CHANGE UP (ref 0–0.2)
BASOPHILS NFR BLD AUTO: 0.9 % — SIGNIFICANT CHANGE UP (ref 0–1)
BILIRUB SERPL-MCNC: 0.2 MG/DL — SIGNIFICANT CHANGE UP (ref 0.2–1.2)
BUN SERPL-MCNC: 15 MG/DL — SIGNIFICANT CHANGE UP (ref 10–20)
CALCIUM SERPL-MCNC: 9.2 MG/DL — SIGNIFICANT CHANGE UP (ref 8.4–10.4)
CHLORIDE SERPL-SCNC: 99 MMOL/L — SIGNIFICANT CHANGE UP (ref 98–110)
CO2 SERPL-SCNC: 32 MMOL/L — SIGNIFICANT CHANGE UP (ref 17–32)
CREAT SERPL-MCNC: 0.5 MG/DL — LOW (ref 0.7–1.5)
EGFR: 109 ML/MIN/1.73M2 — SIGNIFICANT CHANGE UP
EOSINOPHIL # BLD AUTO: 0.37 K/UL — SIGNIFICANT CHANGE UP (ref 0–0.7)
EOSINOPHIL NFR BLD AUTO: 3.8 % — SIGNIFICANT CHANGE UP (ref 0–8)
GLUCOSE SERPL-MCNC: 116 MG/DL — HIGH (ref 70–99)
HCT VFR BLD CALC: 27.2 % — LOW (ref 37–47)
HGB BLD-MCNC: 8.2 G/DL — LOW (ref 12–16)
IMM GRANULOCYTES NFR BLD AUTO: 0.6 % — HIGH (ref 0.1–0.3)
LYMPHOCYTES # BLD AUTO: 2.81 K/UL — SIGNIFICANT CHANGE UP (ref 1.2–3.4)
LYMPHOCYTES # BLD AUTO: 29.1 % — SIGNIFICANT CHANGE UP (ref 20.5–51.1)
MAGNESIUM SERPL-MCNC: 2.3 MG/DL — SIGNIFICANT CHANGE UP (ref 1.8–2.4)
MCHC RBC-ENTMCNC: 23.5 PG — LOW (ref 27–31)
MCHC RBC-ENTMCNC: 30.1 G/DL — LOW (ref 32–37)
MCV RBC AUTO: 77.9 FL — LOW (ref 81–99)
MONOCYTES # BLD AUTO: 0.65 K/UL — HIGH (ref 0.1–0.6)
MONOCYTES NFR BLD AUTO: 6.7 % — SIGNIFICANT CHANGE UP (ref 1.7–9.3)
NEUTROPHILS # BLD AUTO: 5.68 K/UL — SIGNIFICANT CHANGE UP (ref 1.4–6.5)
NEUTROPHILS NFR BLD AUTO: 58.9 % — SIGNIFICANT CHANGE UP (ref 42.2–75.2)
NRBC # BLD: 0 /100 WBCS — SIGNIFICANT CHANGE UP (ref 0–0)
PHOSPHATE SERPL-MCNC: 4.9 MG/DL — SIGNIFICANT CHANGE UP (ref 2.1–4.9)
PLATELET # BLD AUTO: 502 K/UL — HIGH (ref 130–400)
PMV BLD: 9.8 FL — SIGNIFICANT CHANGE UP (ref 7.4–10.4)
POTASSIUM SERPL-MCNC: 5.1 MMOL/L — HIGH (ref 3.5–5)
POTASSIUM SERPL-SCNC: 5.1 MMOL/L — HIGH (ref 3.5–5)
PROT SERPL-MCNC: 6.9 G/DL — SIGNIFICANT CHANGE UP (ref 6–8)
RBC # BLD: 3.49 M/UL — LOW (ref 4.2–5.4)
RBC # FLD: 21 % — HIGH (ref 11.5–14.5)
SODIUM SERPL-SCNC: 142 MMOL/L — SIGNIFICANT CHANGE UP (ref 135–146)
WBC # BLD: 9.66 K/UL — SIGNIFICANT CHANGE UP (ref 4.8–10.8)
WBC # FLD AUTO: 9.66 K/UL — SIGNIFICANT CHANGE UP (ref 4.8–10.8)

## 2024-03-07 PROCEDURE — 71045 X-RAY EXAM CHEST 1 VIEW: CPT | Mod: 26

## 2024-03-07 PROCEDURE — 99233 SBSQ HOSP IP/OBS HIGH 50: CPT

## 2024-03-07 RX ORDER — ACETAMINOPHEN 500 MG
1000 TABLET ORAL ONCE
Refills: 0 | Status: COMPLETED | OUTPATIENT
Start: 2024-03-07 | End: 2024-03-14

## 2024-03-07 RX ORDER — ACETAMINOPHEN 500 MG
650 TABLET ORAL EVERY 8 HOURS
Refills: 0 | Status: DISCONTINUED | OUTPATIENT
Start: 2024-03-07 | End: 2024-04-08

## 2024-03-07 RX ADMIN — LEVETIRACETAM 400 MILLIGRAM(S): 250 TABLET, FILM COATED ORAL at 05:08

## 2024-03-07 RX ADMIN — Medication 1 DROP(S): at 06:26

## 2024-03-07 RX ADMIN — Medication 250 MILLIGRAM(S): at 17:26

## 2024-03-07 RX ADMIN — Medication 3 MILLILITER(S): at 07:49

## 2024-03-07 RX ADMIN — LEVETIRACETAM 400 MILLIGRAM(S): 250 TABLET, FILM COATED ORAL at 18:24

## 2024-03-07 RX ADMIN — Medication 4 MILLILITER(S): at 13:35

## 2024-03-07 RX ADMIN — Medication 1 DROP(S): at 17:20

## 2024-03-07 RX ADMIN — MEROPENEM 100 MILLIGRAM(S): 1 INJECTION INTRAVENOUS at 13:33

## 2024-03-07 RX ADMIN — POLYETHYLENE GLYCOL 3350 17 GRAM(S): 17 POWDER, FOR SOLUTION ORAL at 21:16

## 2024-03-07 RX ADMIN — Medication 1 APPLICATION(S): at 11:26

## 2024-03-07 RX ADMIN — Medication 250 MILLIGRAM(S): at 05:07

## 2024-03-07 RX ADMIN — MIDODRINE HYDROCHLORIDE 5 MILLIGRAM(S): 2.5 TABLET ORAL at 11:28

## 2024-03-07 RX ADMIN — MIDODRINE HYDROCHLORIDE 20 MILLIGRAM(S): 2.5 TABLET ORAL at 05:06

## 2024-03-07 RX ADMIN — CASPOFUNGIN ACETATE 260 MILLIGRAM(S): 7 INJECTION, POWDER, LYOPHILIZED, FOR SOLUTION INTRAVENOUS at 11:22

## 2024-03-07 RX ADMIN — CHLORHEXIDINE GLUCONATE 1 APPLICATION(S): 213 SOLUTION TOPICAL at 11:27

## 2024-03-07 RX ADMIN — Medication 3 MILLILITER(S): at 19:54

## 2024-03-07 RX ADMIN — Medication 1 TABLET(S): at 17:26

## 2024-03-07 RX ADMIN — MEROPENEM 100 MILLIGRAM(S): 1 INJECTION INTRAVENOUS at 05:08

## 2024-03-07 RX ADMIN — ENOXAPARIN SODIUM 50 MILLIGRAM(S): 100 INJECTION SUBCUTANEOUS at 17:25

## 2024-03-07 RX ADMIN — Medication 4 MILLILITER(S): at 19:55

## 2024-03-07 RX ADMIN — Medication 4 MILLILITER(S): at 07:50

## 2024-03-07 RX ADMIN — Medication 1 APPLICATION(S): at 17:23

## 2024-03-07 RX ADMIN — Medication 3 MILLILITER(S): at 13:35

## 2024-03-07 RX ADMIN — Medication 3 MILLIGRAM(S): at 21:16

## 2024-03-07 RX ADMIN — ENOXAPARIN SODIUM 50 MILLIGRAM(S): 100 INJECTION SUBCUTANEOUS at 05:07

## 2024-03-07 RX ADMIN — GABAPENTIN 300 MILLIGRAM(S): 400 CAPSULE ORAL at 17:21

## 2024-03-07 RX ADMIN — Medication 1 TABLET(S): at 05:07

## 2024-03-07 RX ADMIN — Medication 1 APPLICATION(S): at 05:08

## 2024-03-07 RX ADMIN — MEROPENEM 100 MILLIGRAM(S): 1 INJECTION INTRAVENOUS at 21:17

## 2024-03-07 RX ADMIN — RALOXIFENE HYDROCHLORIDE 60 MILLIGRAM(S): 60 TABLET, COATED ORAL at 11:26

## 2024-03-07 RX ADMIN — SENNA PLUS 2 TABLET(S): 8.6 TABLET ORAL at 21:16

## 2024-03-07 RX ADMIN — MIDODRINE HYDROCHLORIDE 10 MILLIGRAM(S): 2.5 TABLET ORAL at 17:20

## 2024-03-07 RX ADMIN — Medication 650 MILLIGRAM(S): at 18:25

## 2024-03-07 RX ADMIN — Medication 2 SPRAY(S): at 05:08

## 2024-03-07 RX ADMIN — PANTOPRAZOLE SODIUM 40 MILLIGRAM(S): 20 TABLET, DELAYED RELEASE ORAL at 05:17

## 2024-03-07 RX ADMIN — Medication 2 SPRAY(S): at 21:16

## 2024-03-07 RX ADMIN — GABAPENTIN 300 MILLIGRAM(S): 400 CAPSULE ORAL at 05:06

## 2024-03-07 RX ADMIN — Medication 2 SPRAY(S): at 13:34

## 2024-03-07 NOTE — PROGRESS NOTE ADULT - ASSESSMENT
58F w/ Down Syndrome/Cerebral Palsy/Intellectual Disability(Non-verbal at baseline), Seizure Disorder, DVT admitted for Sepsis w/ Acute Respiratory Failure w/ Hypoxia 2/2 Pneumonia w/ Abscess & RSV.    #Acute Respiratory Failure w/ Hypoxia  #Lung Abscess  #Pneumonia  #RSV  #Sepsis  #Hyperkalemia  #Seizure Disorder  #Down Syndrome    - c/w course of Meropenem and Caspofungin per ID through 3/10  - continues to require midodrine 20 TID  - per chart, patient is DNR/DNI (completed by 2 physician signature 2/29) and per chart if there is no improvement will change level of care to CMO ( change in level of care was approved by [employer] [place] [employer], paperwork in the paper chart)   - c/w keppra, gabapentin for seizure disorder  - continues to require NGT for feeds  - mild hyperkalemia today, continue to monitor potassium daily    #Progress Note Handoff  Pending (specify):  continue to monitor response to treatment, if declining then per [facility] there is [facility] approval for CMO  Family discussion: house staff updated pt family  Disposition: From nursing facility/group home ([facility])  58F w/ Down Syndrome/Cerebral Palsy/Intellectual Disability(Non-verbal at baseline), Seizure Disorder, DVT admitted for Sepsis w/ Acute Respiratory Failure w/ Hypoxia 2/2 Pneumonia w/ Abscess & RSV.    #Acute Respiratory Failure w/ Hypoxia  #Lung Abscess  #Pneumonia  #RSV  #Sepsis  #Hyperkalemia  #DVT  #Seizure Disorder  #Down Syndrome    - c/w course of Meropenem and Caspofungin per ID through 3/10  - continues to require midodrine 20 TID  - per chart, patient is DNR/DNI (completed by 2 physician signature 2/29) and per chart if there is no improvement will change level of care to CMO ( change in level of care was approved by [employer] [place] [employer], paperwork in the paper chart)   - c/w therapeutic lovenox for hx of DVT  - c/w keppra, gabapentin for seizure disorder  - continues to require NGT for feeds  - mild hyperkalemia today, continue to monitor potassium daily    #Progress Note Handoff  Pending (specify):  continue to monitor response to treatment, if declining then per [facility] there is [facility] approval for CMO  Family discussion: house staff updated pt family  [facility]

## 2024-03-07 NOTE — PROGRESS NOTE ADULT - SUBJECTIVE AND OBJECTIVE BOX
CC: 58F admit for Acute Respiratory Failure 2/2 RSV/ Pneumonia/Lung Abscess    SUBJECTIVE / OVERNIGHT EVENTS:  No acute events overnight. Patient seen and evaluated at bedside. Patient unable to provide history due to underlying intellectual disability     ROS:  no reported vomiting by staff    MEDICATIONS  (STANDING):  acetylcysteine 20%  Inhalation 4 milliLiter(s) Inhalation every 6 hours  albuterol/ipratropium for Nebulization 3 milliLiter(s) Nebulizer every 6 hours  AQUAPHOR (petrolatum Ointment) 1 Application(s) Topical two times a day  artificial  tears Solution 1 Drop(s) Both EYES two times a day  calcium carbonate   1250 mG (OsCal) 1 Tablet(s) Oral two times a day  caspofungin IVPB      caspofungin IVPB 50 milliGRAM(s) IV Intermittent every 24 hours  chlorhexidine 2% Cloths 1 Application(s) Topical daily  chlorhexidine 2% Cloths 1 Application(s) Topical daily  enoxaparin Injectable 50 milliGRAM(s) SubCutaneous every 12 hours  erythromycin   Ointment 1 Application(s) Both EYES daily  gabapentin 300 milliGRAM(s) Oral every 12 hours  levETIRAcetam  IVPB 500 milliGRAM(s) IV Intermittent two times a day  melatonin 3 milliGRAM(s) Oral at bedtime  meropenem  IVPB 1000 milliGRAM(s) IV Intermittent every 8 hours  midodrine. 20 milliGRAM(s) Oral three times a day  pantoprazole  Injectable 40 milliGRAM(s) IV Push every 24 hours  polyethylene glycol 3350 17 Gram(s) Oral at bedtime  raloxifene 60 milliGRAM(s) Oral daily  saccharomyces boulardii 250 milliGRAM(s) Oral two times a day  senna 2 Tablet(s) Oral at bedtime  sodium chloride 0.65% Nasal 2 Spray(s) Both Nostrils three times a day  sodium chloride 0.9% Bolus 500 milliLiter(s) IV Bolus once    MEDICATIONS  (PRN):  aluminum hydroxide/magnesium hydroxide/simethicone Suspension 30 milliLiter(s) Oral every 4 hours PRN Dyspepsia  ondansetron Injectable 4 milliGRAM(s) IV Push every 8 hours PRN Nausea and/or Vomiting    CAPILLARY BLOOD GLUCOSE  POCT Blood Glucose.: 149 mg/dL (07 Mar 2024 07:55)  POCT Blood Glucose.: 146 mg/dL (06 Mar 2024 21:16)  POCT Blood Glucose.: 135 mg/dL (06 Mar 2024 17:05)    I&O's Summary  06 Mar 2024 07:01  -  07 Mar 2024 07:00  --------------------------------------------------------  IN: 900 mL / OUT: 0 mL / NET: 900 mL    Physical Exam  Vital Signs Last 24 Hrs  T(C): 36.9 (07 Mar 2024 07:31), Max: 37 (06 Mar 2024 20:50)  T(F): 98.4 (07 Mar 2024 07:31), Max: 98.6 (06 Mar 2024 20:50)  HR: 74 (07 Mar 2024 07:31) (70 - 74)  BP: 110/64 (07 Mar 2024 07:31) (102/52 - 110/64)  RR: 18 (07 Mar 2024 07:31) (18 - 18)  SpO2: 95% (06 Mar 2024 18:00) (95% - 95%)    Parameters below as of 06 Mar 2024 18:00  Patient On (Oxygen Delivery Method): mask, Venturi    O2 Concentration (%): 40      GENERAL: NAD  HEAD:  Atraumatic, Normocephalic  Lung: normal work of breathing, no anterior rhonchi  Cardiovascular: S1&S2+, rrr, no m/r/g appreciated  ABDOMEN: soft, nt  SKIN: warm and dry, no visible purulence in exposed areas    LABS:                        8.2    9.66  )-----------( 502      ( 07 Mar 2024 08:04 )             27.2     03-07    142  |  99  |  15  ----------------------------<  116<H>  5.1<H>   |  32  |  0.5<L>    Ca    9.2      07 Mar 2024 08:04  Phos  4.9     03-07  Mg     2.3     03-07    TPro  6.9  /  Alb  3.0<L>  /  TBili  0.2  /  DBili  x   /  AST  13  /  ALT  10  /  AlkPhos  101  03-07          Urinalysis Basic - ( 07 Mar 2024 08:04 )    Color: x / Appearance: x / SG: x / pH: x  Gluc: 116 mg/dL / Ketone: x  / Bili: x / Urobili: x   Blood: x / Protein: x / Nitrite: x   Leuk Esterase: x / RBC: x / WBC x   Sq Epi: x / Non Sq Epi: x / Bacteria: x          RADIOLOGY & ADDITIONAL TESTS:  Results Reviewed:   Imaging Personally Reviewed:  Electrocardiogram Personally Reviewed:    COORDINATION OF CARE:  Care Discussed with Consultants/Other Providers [Y/N]:  Prior or Outpatient Records Reviewed [Y/N]:

## 2024-03-08 LAB
ALBUMIN SERPL ELPH-MCNC: 2.9 G/DL — LOW (ref 3.5–5.2)
ALP SERPL-CCNC: 96 U/L — SIGNIFICANT CHANGE UP (ref 30–115)
ALT FLD-CCNC: 10 U/L — SIGNIFICANT CHANGE UP (ref 0–41)
ANION GAP SERPL CALC-SCNC: 8 MMOL/L — SIGNIFICANT CHANGE UP (ref 7–14)
AST SERPL-CCNC: 13 U/L — SIGNIFICANT CHANGE UP (ref 0–41)
BASOPHILS # BLD AUTO: 0.06 K/UL — SIGNIFICANT CHANGE UP (ref 0–0.2)
BASOPHILS NFR BLD AUTO: 0.5 % — SIGNIFICANT CHANGE UP (ref 0–1)
BILIRUB SERPL-MCNC: 0.2 MG/DL — SIGNIFICANT CHANGE UP (ref 0.2–1.2)
BUN SERPL-MCNC: 15 MG/DL — SIGNIFICANT CHANGE UP (ref 10–20)
CALCIUM SERPL-MCNC: 8.7 MG/DL — SIGNIFICANT CHANGE UP (ref 8.4–10.5)
CHLORIDE SERPL-SCNC: 98 MMOL/L — SIGNIFICANT CHANGE UP (ref 98–110)
CO2 SERPL-SCNC: 30 MMOL/L — SIGNIFICANT CHANGE UP (ref 17–32)
CREAT SERPL-MCNC: 0.5 MG/DL — LOW (ref 0.7–1.5)
EGFR: 109 ML/MIN/1.73M2 — SIGNIFICANT CHANGE UP
EOSINOPHIL # BLD AUTO: 0.37 K/UL — SIGNIFICANT CHANGE UP (ref 0–0.7)
EOSINOPHIL NFR BLD AUTO: 2.9 % — SIGNIFICANT CHANGE UP (ref 0–8)
GLUCOSE SERPL-MCNC: 170 MG/DL — HIGH (ref 70–99)
HCT VFR BLD CALC: 24.6 % — LOW (ref 37–47)
HGB BLD-MCNC: 7.6 G/DL — LOW (ref 12–16)
IMM GRANULOCYTES NFR BLD AUTO: 0.6 % — HIGH (ref 0.1–0.3)
LYMPHOCYTES # BLD AUTO: 16.8 % — LOW (ref 20.5–51.1)
LYMPHOCYTES # BLD AUTO: 2.11 K/UL — SIGNIFICANT CHANGE UP (ref 1.2–3.4)
MAGNESIUM SERPL-MCNC: 2.2 MG/DL — SIGNIFICANT CHANGE UP (ref 1.8–2.4)
MCHC RBC-ENTMCNC: 23.9 PG — LOW (ref 27–31)
MCHC RBC-ENTMCNC: 30.9 G/DL — LOW (ref 32–37)
MCV RBC AUTO: 77.4 FL — LOW (ref 81–99)
MONOCYTES # BLD AUTO: 0.46 K/UL — SIGNIFICANT CHANGE UP (ref 0.1–0.6)
MONOCYTES NFR BLD AUTO: 3.7 % — SIGNIFICANT CHANGE UP (ref 1.7–9.3)
NEUTROPHILS # BLD AUTO: 9.51 K/UL — HIGH (ref 1.4–6.5)
NEUTROPHILS NFR BLD AUTO: 75.5 % — HIGH (ref 42.2–75.2)
NRBC # BLD: 0 /100 WBCS — SIGNIFICANT CHANGE UP (ref 0–0)
PLATELET # BLD AUTO: 444 K/UL — HIGH (ref 130–400)
PMV BLD: 9.8 FL — SIGNIFICANT CHANGE UP (ref 7.4–10.4)
POTASSIUM SERPL-MCNC: 4 MMOL/L — SIGNIFICANT CHANGE UP (ref 3.5–5)
POTASSIUM SERPL-SCNC: 4 MMOL/L — SIGNIFICANT CHANGE UP (ref 3.5–5)
PROT SERPL-MCNC: 6.6 G/DL — SIGNIFICANT CHANGE UP (ref 6–8)
RBC # BLD: 3.18 M/UL — LOW (ref 4.2–5.4)
RBC # FLD: 21 % — HIGH (ref 11.5–14.5)
SODIUM SERPL-SCNC: 136 MMOL/L — SIGNIFICANT CHANGE UP (ref 135–146)
WBC # BLD: 12.58 K/UL — HIGH (ref 4.8–10.8)
WBC # FLD AUTO: 12.58 K/UL — HIGH (ref 4.8–10.8)

## 2024-03-08 PROCEDURE — 99233 SBSQ HOSP IP/OBS HIGH 50: CPT

## 2024-03-08 RX ADMIN — GABAPENTIN 300 MILLIGRAM(S): 400 CAPSULE ORAL at 17:02

## 2024-03-08 RX ADMIN — MIDODRINE HYDROCHLORIDE 20 MILLIGRAM(S): 2.5 TABLET ORAL at 17:02

## 2024-03-08 RX ADMIN — Medication 3 MILLILITER(S): at 08:04

## 2024-03-08 RX ADMIN — Medication 4 MILLILITER(S): at 14:28

## 2024-03-08 RX ADMIN — Medication 250 MILLIGRAM(S): at 17:02

## 2024-03-08 RX ADMIN — Medication 3 MILLILITER(S): at 14:27

## 2024-03-08 RX ADMIN — Medication 1 DROP(S): at 05:40

## 2024-03-08 RX ADMIN — PANTOPRAZOLE SODIUM 40 MILLIGRAM(S): 20 TABLET, DELAYED RELEASE ORAL at 05:38

## 2024-03-08 RX ADMIN — LEVETIRACETAM 400 MILLIGRAM(S): 250 TABLET, FILM COATED ORAL at 17:01

## 2024-03-08 RX ADMIN — CHLORHEXIDINE GLUCONATE 1 APPLICATION(S): 213 SOLUTION TOPICAL at 11:25

## 2024-03-08 RX ADMIN — ENOXAPARIN SODIUM 50 MILLIGRAM(S): 100 INJECTION SUBCUTANEOUS at 17:03

## 2024-03-08 RX ADMIN — Medication 1 APPLICATION(S): at 11:26

## 2024-03-08 RX ADMIN — MIDODRINE HYDROCHLORIDE 20 MILLIGRAM(S): 2.5 TABLET ORAL at 05:43

## 2024-03-08 RX ADMIN — Medication 1 TABLET(S): at 05:38

## 2024-03-08 RX ADMIN — GABAPENTIN 300 MILLIGRAM(S): 400 CAPSULE ORAL at 05:39

## 2024-03-08 RX ADMIN — Medication 3 MILLIGRAM(S): at 21:32

## 2024-03-08 RX ADMIN — Medication 1 DROP(S): at 17:03

## 2024-03-08 RX ADMIN — ENOXAPARIN SODIUM 50 MILLIGRAM(S): 100 INJECTION SUBCUTANEOUS at 05:38

## 2024-03-08 RX ADMIN — MIDODRINE HYDROCHLORIDE 20 MILLIGRAM(S): 2.5 TABLET ORAL at 11:25

## 2024-03-08 RX ADMIN — RALOXIFENE HYDROCHLORIDE 60 MILLIGRAM(S): 60 TABLET, COATED ORAL at 11:26

## 2024-03-08 RX ADMIN — CASPOFUNGIN ACETATE 260 MILLIGRAM(S): 7 INJECTION, POWDER, LYOPHILIZED, FOR SOLUTION INTRAVENOUS at 13:08

## 2024-03-08 RX ADMIN — MEROPENEM 100 MILLIGRAM(S): 1 INJECTION INTRAVENOUS at 21:32

## 2024-03-08 RX ADMIN — Medication 1 TABLET(S): at 17:02

## 2024-03-08 RX ADMIN — Medication 2 SPRAY(S): at 05:37

## 2024-03-08 RX ADMIN — POLYETHYLENE GLYCOL 3350 17 GRAM(S): 17 POWDER, FOR SOLUTION ORAL at 21:32

## 2024-03-08 RX ADMIN — Medication 3 MILLILITER(S): at 20:20

## 2024-03-08 RX ADMIN — Medication 2 SPRAY(S): at 13:08

## 2024-03-08 RX ADMIN — Medication 2 SPRAY(S): at 21:32

## 2024-03-08 RX ADMIN — LEVETIRACETAM 400 MILLIGRAM(S): 250 TABLET, FILM COATED ORAL at 05:41

## 2024-03-08 RX ADMIN — Medication 4 MILLILITER(S): at 08:05

## 2024-03-08 RX ADMIN — MEROPENEM 100 MILLIGRAM(S): 1 INJECTION INTRAVENOUS at 13:08

## 2024-03-08 RX ADMIN — Medication 1 APPLICATION(S): at 05:38

## 2024-03-08 RX ADMIN — MEROPENEM 100 MILLIGRAM(S): 1 INJECTION INTRAVENOUS at 05:41

## 2024-03-08 RX ADMIN — SENNA PLUS 2 TABLET(S): 8.6 TABLET ORAL at 21:32

## 2024-03-08 RX ADMIN — Medication 1 APPLICATION(S): at 17:03

## 2024-03-08 NOTE — PROGRESS NOTE ADULT - SUBJECTIVE AND OBJECTIVE BOX
CC: 58F admit for Acute Respiratory Failure 2/2 RSV/ Pneumonia/Lung Abscess    SUBJECTIVE / OVERNIGHT EVENTS:  No acute events overnight. Patient seen and evaluated at bedside. Patient unable to provide history due to underlying intellectual disability     ROS:  no events reported by staff    MEDICATIONS  (STANDING):  acetylcysteine 20%  Inhalation 4 milliLiter(s) Inhalation every 6 hours  albuterol/ipratropium for Nebulization 3 milliLiter(s) Nebulizer every 6 hours  AQUAPHOR (petrolatum Ointment) 1 Application(s) Topical two times a day  artificial  tears Solution 1 Drop(s) Both EYES two times a day  calcium carbonate   1250 mG (OsCal) 1 Tablet(s) Oral two times a day  caspofungin IVPB      caspofungin IVPB 50 milliGRAM(s) IV Intermittent every 24 hours  chlorhexidine 2% Cloths 1 Application(s) Topical daily  chlorhexidine 2% Cloths 1 Application(s) Topical daily  enoxaparin Injectable 50 milliGRAM(s) SubCutaneous every 12 hours  erythromycin   Ointment 1 Application(s) Both EYES daily  gabapentin 300 milliGRAM(s) Oral every 12 hours  levETIRAcetam  IVPB 500 milliGRAM(s) IV Intermittent two times a day  melatonin 3 milliGRAM(s) Oral at bedtime  meropenem  IVPB 1000 milliGRAM(s) IV Intermittent every 8 hours  midodrine. 20 milliGRAM(s) Oral three times a day  pantoprazole  Injectable 40 milliGRAM(s) IV Push every 24 hours  polyethylene glycol 3350 17 Gram(s) Oral at bedtime  raloxifene 60 milliGRAM(s) Oral daily  saccharomyces boulardii 250 milliGRAM(s) Oral two times a day  senna 2 Tablet(s) Oral at bedtime  sodium chloride 0.65% Nasal 2 Spray(s) Both Nostrils three times a day  sodium chloride 0.9% Bolus 500 milliLiter(s) IV Bolus once    MEDICATIONS  (PRN):  acetaminophen     Tablet .. 650 milliGRAM(s) Oral every 8 hours PRN Temp greater or equal to 38.5C (101.3F)  acetaminophen   IVPB .. 1000 milliGRAM(s) IV Intermittent once PRN Temp greater or equal to 38C (100.4F)  aluminum hydroxide/magnesium hydroxide/simethicone Suspension 30 milliLiter(s) Oral every 4 hours PRN Dyspepsia  ondansetron Injectable 4 milliGRAM(s) IV Push every 8 hours PRN Nausea and/or Vomiting      CAPILLARY BLOOD GLUCOSE  POCT Blood Glucose.: 106 mg/dL (08 Mar 2024 11:41)  POCT Blood Glucose.: 153 mg/dL (08 Mar 2024 07:40)  POCT Blood Glucose.: 124 mg/dL (08 Mar 2024 01:17)  POCT Blood Glucose.: 176 mg/dL (07 Mar 2024 20:00)    I&O's Summary  08 Mar 2024 07:01  -  08 Mar 2024 12:20  --------------------------------------------------------  IN: 375 mL / OUT: 0 mL / NET: 375 mL    Physical Exam  Vital Signs Last 24 Hrs  T(C): 36.7 (08 Mar 2024 12:17), Max: 39 (07 Mar 2024 18:38)  T(F): 98 (08 Mar 2024 12:17), Max: 102.2 (07 Mar 2024 18:38)  HR: 71 (08 Mar 2024 12:17) (71 - 115)  BP: 116/61 (08 Mar 2024 12:17) (116/61 - 137/80)  BP(mean): 85 (08 Mar 2024 05:08) (85 - 85)  RR: 19 (08 Mar 2024 12:17) (17 - 20)  SpO2: 93% (08 Mar 2024 05:08) (93% - 97%)    Parameters below as of 08 Mar 2024 05:08  Patient On (Oxygen Delivery Method): nasal cannula  O2 Flow (L/min): 3    GENERAL: NAD  HEAD:  Atraumatic, Normocephalic  Lung: normal work of breathing, no anterior rhonchi  Cardiovascular: S1&S2+, rrr, murmur present  ABDOMEN: soft, nt  SKIN: warm and dry, no visible purulence in exposed areas    LABS:                        7.6    12.58 )-----------( 444      ( 08 Mar 2024 08:30 )             24.6     03-08    136  |  98  |  15  ----------------------------<  170<H>  4.0   |  30  |  0.5<L>    Ca    8.7      08 Mar 2024 08:30  Phos  4.9     03-07  Mg     2.2     03-08    TPro  6.6  /  Alb  2.9<L>  /  TBili  0.2  /  DBili  x   /  AST  13  /  ALT  10  /  AlkPhos  96  03-08          Urinalysis Basic - ( 08 Mar 2024 08:30 )    Color: x / Appearance: x / SG: x / pH: x  Gluc: 170 mg/dL / Ketone: x  / Bili: x / Urobili: x   Blood: x / Protein: x / Nitrite: x   Leuk Esterase: x / RBC: x / WBC x   Sq Epi: x / Non Sq Epi: x / Bacteria: x          RADIOLOGY & ADDITIONAL TESTS:  Results Reviewed:   Imaging Personally Reviewed:  Electrocardiogram Personally Reviewed:    COORDINATION OF CARE:  Care Discussed with Consultants/Other Providers [Y/N]:  Prior or Outpatient Records Reviewed [Y/N]:

## 2024-03-08 NOTE — PROGRESS NOTE ADULT - ASSESSMENT
58F w/ Down Syndrome/Cerebral Palsy/Intellectual Disability(Non-verbal at baseline), Seizure Disorder, DVT admitted for Sepsis w/ Acute Respiratory Failure w/ Hypoxia 2/2 Pneumonia w/ Abscess & RSV.    #Acute Respiratory Failure w/ Hypoxia  #Lung Abscess  #Pneumonia  #RSV  #Sepsis  #Hyperkalemia  #DVT  #Seizure Disorder  #Down Syndrome    - Clinically appears to not be improving  - c/w course of Meropenem and Caspofungin per ID through 3/10  - continues to require midodrine 20 TID  - per chart, patient is DNR/DNI (completed by 2 physician signature 2/29) and per chart if there is no improvement after treatment course outlined above, then will change level of care to CMO ( change in level of care was approved by [employer] [place] [employer], paperwork in the paper chart)   - c/w therapeutic lovenox for hx of DVT  - c/w keppra, gabapentin for seizure disorder  - continues to require NGT for feeds  - hyperkalemia from yesterday resolved    #Progress Note Handoff  Pending (specify):  continue to monitor response to treatment, if declining then per [facility] there is [facility] approval for CMO  Family discussion: house staff updated pt family  Disposition: From nursing facility/group home ([facility])

## 2024-03-09 LAB
ALBUMIN SERPL ELPH-MCNC: 3.1 G/DL — LOW (ref 3.5–5.2)
ALP SERPL-CCNC: 92 U/L — SIGNIFICANT CHANGE UP (ref 30–115)
ALT FLD-CCNC: 12 U/L — SIGNIFICANT CHANGE UP (ref 0–41)
ANION GAP SERPL CALC-SCNC: 11 MMOL/L — SIGNIFICANT CHANGE UP (ref 7–14)
AST SERPL-CCNC: 14 U/L — SIGNIFICANT CHANGE UP (ref 0–41)
BASOPHILS # BLD AUTO: 0.08 K/UL — SIGNIFICANT CHANGE UP (ref 0–0.2)
BASOPHILS NFR BLD AUTO: 0.6 % — SIGNIFICANT CHANGE UP (ref 0–1)
BILIRUB SERPL-MCNC: 0.2 MG/DL — SIGNIFICANT CHANGE UP (ref 0.2–1.2)
BUN SERPL-MCNC: 15 MG/DL — SIGNIFICANT CHANGE UP (ref 10–20)
CALCIUM SERPL-MCNC: 9.1 MG/DL — SIGNIFICANT CHANGE UP (ref 8.4–10.5)
CHLORIDE SERPL-SCNC: 100 MMOL/L — SIGNIFICANT CHANGE UP (ref 98–110)
CO2 SERPL-SCNC: 28 MMOL/L — SIGNIFICANT CHANGE UP (ref 17–32)
CREAT SERPL-MCNC: 0.6 MG/DL — LOW (ref 0.7–1.5)
EGFR: 105 ML/MIN/1.73M2 — SIGNIFICANT CHANGE UP
EOSINOPHIL # BLD AUTO: 0.35 K/UL — SIGNIFICANT CHANGE UP (ref 0–0.7)
EOSINOPHIL NFR BLD AUTO: 2.8 % — SIGNIFICANT CHANGE UP (ref 0–8)
GLUCOSE SERPL-MCNC: 133 MG/DL — HIGH (ref 70–99)
HCT VFR BLD CALC: 25.8 % — LOW (ref 37–47)
HGB BLD-MCNC: 7.8 G/DL — LOW (ref 12–16)
IMM GRANULOCYTES NFR BLD AUTO: 0.6 % — HIGH (ref 0.1–0.3)
LYMPHOCYTES # BLD AUTO: 19.7 % — LOW (ref 20.5–51.1)
LYMPHOCYTES # BLD AUTO: 2.48 K/UL — SIGNIFICANT CHANGE UP (ref 1.2–3.4)
MAGNESIUM SERPL-MCNC: 2.3 MG/DL — SIGNIFICANT CHANGE UP (ref 1.8–2.4)
MCHC RBC-ENTMCNC: 23.6 PG — LOW (ref 27–31)
MCHC RBC-ENTMCNC: 30.2 G/DL — LOW (ref 32–37)
MCV RBC AUTO: 77.9 FL — LOW (ref 81–99)
MONOCYTES # BLD AUTO: 0.49 K/UL — SIGNIFICANT CHANGE UP (ref 0.1–0.6)
MONOCYTES NFR BLD AUTO: 3.9 % — SIGNIFICANT CHANGE UP (ref 1.7–9.3)
NEUTROPHILS # BLD AUTO: 9.11 K/UL — HIGH (ref 1.4–6.5)
NEUTROPHILS NFR BLD AUTO: 72.4 % — SIGNIFICANT CHANGE UP (ref 42.2–75.2)
NRBC # BLD: 0 /100 WBCS — SIGNIFICANT CHANGE UP (ref 0–0)
PLATELET # BLD AUTO: 488 K/UL — HIGH (ref 130–400)
PMV BLD: 10 FL — SIGNIFICANT CHANGE UP (ref 7.4–10.4)
POTASSIUM SERPL-MCNC: 4.5 MMOL/L — SIGNIFICANT CHANGE UP (ref 3.5–5)
POTASSIUM SERPL-SCNC: 4.5 MMOL/L — SIGNIFICANT CHANGE UP (ref 3.5–5)
PROT SERPL-MCNC: 6.9 G/DL — SIGNIFICANT CHANGE UP (ref 6–8)
RBC # BLD: 3.31 M/UL — LOW (ref 4.2–5.4)
RBC # FLD: 21.1 % — HIGH (ref 11.5–14.5)
SODIUM SERPL-SCNC: 139 MMOL/L — SIGNIFICANT CHANGE UP (ref 135–146)
WBC # BLD: 12.58 K/UL — HIGH (ref 4.8–10.8)
WBC # FLD AUTO: 12.58 K/UL — HIGH (ref 4.8–10.8)

## 2024-03-09 PROCEDURE — 99232 SBSQ HOSP IP/OBS MODERATE 35: CPT

## 2024-03-09 RX ADMIN — Medication 3 MILLILITER(S): at 20:15

## 2024-03-09 RX ADMIN — Medication 1 APPLICATION(S): at 05:32

## 2024-03-09 RX ADMIN — PANTOPRAZOLE SODIUM 40 MILLIGRAM(S): 20 TABLET, DELAYED RELEASE ORAL at 05:30

## 2024-03-09 RX ADMIN — POLYETHYLENE GLYCOL 3350 17 GRAM(S): 17 POWDER, FOR SOLUTION ORAL at 21:25

## 2024-03-09 RX ADMIN — MIDODRINE HYDROCHLORIDE 20 MILLIGRAM(S): 2.5 TABLET ORAL at 17:37

## 2024-03-09 RX ADMIN — CHLORHEXIDINE GLUCONATE 1 APPLICATION(S): 213 SOLUTION TOPICAL at 11:20

## 2024-03-09 RX ADMIN — Medication 3 MILLILITER(S): at 16:05

## 2024-03-09 RX ADMIN — Medication 3 MILLILITER(S): at 09:01

## 2024-03-09 RX ADMIN — Medication 1 APPLICATION(S): at 11:20

## 2024-03-09 RX ADMIN — Medication 2 SPRAY(S): at 21:24

## 2024-03-09 RX ADMIN — Medication 4 MILLILITER(S): at 20:16

## 2024-03-09 RX ADMIN — GABAPENTIN 300 MILLIGRAM(S): 400 CAPSULE ORAL at 17:38

## 2024-03-09 RX ADMIN — MEROPENEM 100 MILLIGRAM(S): 1 INJECTION INTRAVENOUS at 05:31

## 2024-03-09 RX ADMIN — Medication 1 DROP(S): at 17:38

## 2024-03-09 RX ADMIN — Medication 3 MILLIGRAM(S): at 21:25

## 2024-03-09 RX ADMIN — Medication 1 APPLICATION(S): at 17:38

## 2024-03-09 RX ADMIN — MEROPENEM 100 MILLIGRAM(S): 1 INJECTION INTRAVENOUS at 14:54

## 2024-03-09 RX ADMIN — Medication 2 SPRAY(S): at 15:17

## 2024-03-09 RX ADMIN — Medication 1 DROP(S): at 05:32

## 2024-03-09 RX ADMIN — ENOXAPARIN SODIUM 50 MILLIGRAM(S): 100 INJECTION SUBCUTANEOUS at 17:38

## 2024-03-09 RX ADMIN — Medication 1 TABLET(S): at 05:31

## 2024-03-09 RX ADMIN — ENOXAPARIN SODIUM 50 MILLIGRAM(S): 100 INJECTION SUBCUTANEOUS at 05:31

## 2024-03-09 RX ADMIN — MIDODRINE HYDROCHLORIDE 20 MILLIGRAM(S): 2.5 TABLET ORAL at 05:30

## 2024-03-09 RX ADMIN — Medication 250 MILLIGRAM(S): at 17:37

## 2024-03-09 RX ADMIN — LEVETIRACETAM 400 MILLIGRAM(S): 250 TABLET, FILM COATED ORAL at 05:31

## 2024-03-09 RX ADMIN — MIDODRINE HYDROCHLORIDE 20 MILLIGRAM(S): 2.5 TABLET ORAL at 11:20

## 2024-03-09 RX ADMIN — Medication 4 MILLILITER(S): at 09:02

## 2024-03-09 RX ADMIN — GABAPENTIN 300 MILLIGRAM(S): 400 CAPSULE ORAL at 05:30

## 2024-03-09 RX ADMIN — RALOXIFENE HYDROCHLORIDE 60 MILLIGRAM(S): 60 TABLET, COATED ORAL at 11:19

## 2024-03-09 RX ADMIN — MEROPENEM 100 MILLIGRAM(S): 1 INJECTION INTRAVENOUS at 21:26

## 2024-03-09 RX ADMIN — CASPOFUNGIN ACETATE 260 MILLIGRAM(S): 7 INJECTION, POWDER, LYOPHILIZED, FOR SOLUTION INTRAVENOUS at 11:20

## 2024-03-09 RX ADMIN — SENNA PLUS 2 TABLET(S): 8.6 TABLET ORAL at 21:25

## 2024-03-09 RX ADMIN — Medication 1 TABLET(S): at 17:38

## 2024-03-09 RX ADMIN — Medication 2 SPRAY(S): at 05:32

## 2024-03-09 RX ADMIN — Medication 4 MILLILITER(S): at 16:14

## 2024-03-09 RX ADMIN — LEVETIRACETAM 400 MILLIGRAM(S): 250 TABLET, FILM COATED ORAL at 17:37

## 2024-03-10 LAB
ALBUMIN SERPL ELPH-MCNC: 3.1 G/DL — LOW (ref 3.5–5.2)
ALP SERPL-CCNC: 96 U/L — SIGNIFICANT CHANGE UP (ref 30–115)
ALT FLD-CCNC: 10 U/L — SIGNIFICANT CHANGE UP (ref 0–41)
ANION GAP SERPL CALC-SCNC: 10 MMOL/L — SIGNIFICANT CHANGE UP (ref 7–14)
AST SERPL-CCNC: 13 U/L — SIGNIFICANT CHANGE UP (ref 0–41)
BASOPHILS # BLD AUTO: 0.08 K/UL — SIGNIFICANT CHANGE UP (ref 0–0.2)
BASOPHILS NFR BLD AUTO: 0.7 % — SIGNIFICANT CHANGE UP (ref 0–1)
BILIRUB SERPL-MCNC: 0.2 MG/DL — SIGNIFICANT CHANGE UP (ref 0.2–1.2)
BUN SERPL-MCNC: 14 MG/DL — SIGNIFICANT CHANGE UP (ref 10–20)
CALCIUM SERPL-MCNC: 9.3 MG/DL — SIGNIFICANT CHANGE UP (ref 8.4–10.4)
CHLORIDE SERPL-SCNC: 101 MMOL/L — SIGNIFICANT CHANGE UP (ref 98–110)
CO2 SERPL-SCNC: 31 MMOL/L — SIGNIFICANT CHANGE UP (ref 17–32)
CREAT SERPL-MCNC: 0.5 MG/DL — LOW (ref 0.7–1.5)
EGFR: 109 ML/MIN/1.73M2 — SIGNIFICANT CHANGE UP
EOSINOPHIL # BLD AUTO: 0.4 K/UL — SIGNIFICANT CHANGE UP (ref 0–0.7)
EOSINOPHIL NFR BLD AUTO: 3.6 % — SIGNIFICANT CHANGE UP (ref 0–8)
GLUCOSE SERPL-MCNC: 117 MG/DL — HIGH (ref 70–99)
HCT VFR BLD CALC: 25.4 % — LOW (ref 37–47)
HGB BLD-MCNC: 7.5 G/DL — LOW (ref 12–16)
IMM GRANULOCYTES NFR BLD AUTO: 0.4 % — HIGH (ref 0.1–0.3)
LYMPHOCYTES # BLD AUTO: 2.36 K/UL — SIGNIFICANT CHANGE UP (ref 1.2–3.4)
LYMPHOCYTES # BLD AUTO: 21.5 % — SIGNIFICANT CHANGE UP (ref 20.5–51.1)
MAGNESIUM SERPL-MCNC: 2.4 MG/DL — SIGNIFICANT CHANGE UP (ref 1.8–2.4)
MCHC RBC-ENTMCNC: 23.1 PG — LOW (ref 27–31)
MCHC RBC-ENTMCNC: 29.5 G/DL — LOW (ref 32–37)
MCV RBC AUTO: 78.4 FL — LOW (ref 81–99)
MONOCYTES # BLD AUTO: 0.58 K/UL — SIGNIFICANT CHANGE UP (ref 0.1–0.6)
MONOCYTES NFR BLD AUTO: 5.3 % — SIGNIFICANT CHANGE UP (ref 1.7–9.3)
NEUTROPHILS # BLD AUTO: 7.53 K/UL — HIGH (ref 1.4–6.5)
NEUTROPHILS NFR BLD AUTO: 68.5 % — SIGNIFICANT CHANGE UP (ref 42.2–75.2)
NRBC # BLD: 0 /100 WBCS — SIGNIFICANT CHANGE UP (ref 0–0)
PLATELET # BLD AUTO: 491 K/UL — HIGH (ref 130–400)
PMV BLD: 9.9 FL — SIGNIFICANT CHANGE UP (ref 7.4–10.4)
POTASSIUM SERPL-MCNC: 4.4 MMOL/L — SIGNIFICANT CHANGE UP (ref 3.5–5)
POTASSIUM SERPL-SCNC: 4.4 MMOL/L — SIGNIFICANT CHANGE UP (ref 3.5–5)
PROT SERPL-MCNC: 7 G/DL — SIGNIFICANT CHANGE UP (ref 6–8)
RBC # BLD: 3.24 M/UL — LOW (ref 4.2–5.4)
RBC # FLD: 20.7 % — HIGH (ref 11.5–14.5)
SODIUM SERPL-SCNC: 142 MMOL/L — SIGNIFICANT CHANGE UP (ref 135–146)
WBC # BLD: 10.99 K/UL — HIGH (ref 4.8–10.8)
WBC # FLD AUTO: 10.99 K/UL — HIGH (ref 4.8–10.8)

## 2024-03-10 PROCEDURE — 99232 SBSQ HOSP IP/OBS MODERATE 35: CPT

## 2024-03-10 RX ADMIN — Medication 1 TABLET(S): at 17:13

## 2024-03-10 RX ADMIN — MEROPENEM 100 MILLIGRAM(S): 1 INJECTION INTRAVENOUS at 13:38

## 2024-03-10 RX ADMIN — Medication 1 DROP(S): at 18:31

## 2024-03-10 RX ADMIN — Medication 4 MILLILITER(S): at 20:04

## 2024-03-10 RX ADMIN — Medication 1 APPLICATION(S): at 12:46

## 2024-03-10 RX ADMIN — SENNA PLUS 2 TABLET(S): 8.6 TABLET ORAL at 21:23

## 2024-03-10 RX ADMIN — MIDODRINE HYDROCHLORIDE 20 MILLIGRAM(S): 2.5 TABLET ORAL at 12:46

## 2024-03-10 RX ADMIN — Medication 2 SPRAY(S): at 21:23

## 2024-03-10 RX ADMIN — MEROPENEM 100 MILLIGRAM(S): 1 INJECTION INTRAVENOUS at 21:23

## 2024-03-10 RX ADMIN — Medication 3 MILLILITER(S): at 13:16

## 2024-03-10 RX ADMIN — CASPOFUNGIN ACETATE 260 MILLIGRAM(S): 7 INJECTION, POWDER, LYOPHILIZED, FOR SOLUTION INTRAVENOUS at 12:45

## 2024-03-10 RX ADMIN — CHLORHEXIDINE GLUCONATE 1 APPLICATION(S): 213 SOLUTION TOPICAL at 12:59

## 2024-03-10 RX ADMIN — MIDODRINE HYDROCHLORIDE 20 MILLIGRAM(S): 2.5 TABLET ORAL at 05:40

## 2024-03-10 RX ADMIN — Medication 2 SPRAY(S): at 13:39

## 2024-03-10 RX ADMIN — GABAPENTIN 300 MILLIGRAM(S): 400 CAPSULE ORAL at 05:40

## 2024-03-10 RX ADMIN — Medication 1 APPLICATION(S): at 17:14

## 2024-03-10 RX ADMIN — ENOXAPARIN SODIUM 50 MILLIGRAM(S): 100 INJECTION SUBCUTANEOUS at 05:40

## 2024-03-10 RX ADMIN — RALOXIFENE HYDROCHLORIDE 60 MILLIGRAM(S): 60 TABLET, COATED ORAL at 12:45

## 2024-03-10 RX ADMIN — LEVETIRACETAM 400 MILLIGRAM(S): 250 TABLET, FILM COATED ORAL at 17:14

## 2024-03-10 RX ADMIN — Medication 4 MILLILITER(S): at 08:35

## 2024-03-10 RX ADMIN — GABAPENTIN 300 MILLIGRAM(S): 400 CAPSULE ORAL at 17:13

## 2024-03-10 RX ADMIN — MEROPENEM 100 MILLIGRAM(S): 1 INJECTION INTRAVENOUS at 05:39

## 2024-03-10 RX ADMIN — POLYETHYLENE GLYCOL 3350 17 GRAM(S): 17 POWDER, FOR SOLUTION ORAL at 21:23

## 2024-03-10 RX ADMIN — PANTOPRAZOLE SODIUM 40 MILLIGRAM(S): 20 TABLET, DELAYED RELEASE ORAL at 05:41

## 2024-03-10 RX ADMIN — ENOXAPARIN SODIUM 50 MILLIGRAM(S): 100 INJECTION SUBCUTANEOUS at 17:12

## 2024-03-10 RX ADMIN — Medication 3 MILLILITER(S): at 20:03

## 2024-03-10 RX ADMIN — Medication 1 TABLET(S): at 05:40

## 2024-03-10 RX ADMIN — MIDODRINE HYDROCHLORIDE 20 MILLIGRAM(S): 2.5 TABLET ORAL at 17:13

## 2024-03-10 RX ADMIN — Medication 2 SPRAY(S): at 05:40

## 2024-03-10 RX ADMIN — Medication 250 MILLIGRAM(S): at 17:13

## 2024-03-10 RX ADMIN — Medication 3 MILLILITER(S): at 08:35

## 2024-03-10 RX ADMIN — Medication 3 MILLIGRAM(S): at 21:23

## 2024-03-10 RX ADMIN — Medication 1 DROP(S): at 05:41

## 2024-03-10 RX ADMIN — Medication 1 APPLICATION(S): at 05:41

## 2024-03-10 RX ADMIN — LEVETIRACETAM 400 MILLIGRAM(S): 250 TABLET, FILM COATED ORAL at 05:39

## 2024-03-10 NOTE — PROGRESS NOTE ADULT - SUBJECTIVE AND OBJECTIVE BOX
MEDICINE ATTENDING PROGRESS NOTE  58F w/ Down Syndrome/Cerebral Palsy/Intellectual Disability(Non-verbal at baseline), Seizure Disorder, DVT admitted for Sepsis w/ Acute Respiratory Failure w/ Hypoxia 2/2 Pneumonia w/ Abscess & RSV.    Subjective: Pt seen and examined at bedside. No acute complaints reported by aid at bedside. No overnight issue per nursing documentation    ROS  Patient unable to provide history due to underlying intellectual disability     MEDICATIONS  (STANDING):  acetylcysteine 20%  Inhalation 4 milliLiter(s) Inhalation every 6 hours  albuterol/ipratropium for Nebulization 3 milliLiter(s) Nebulizer every 6 hours  AQUAPHOR (petrolatum Ointment) 1 Application(s) Topical two times a day  artificial  tears Solution 1 Drop(s) Both EYES two times a day  calcium carbonate   1250 mG (OsCal) 1 Tablet(s) Oral two times a day  caspofungin IVPB      caspofungin IVPB 50 milliGRAM(s) IV Intermittent every 24 hours  chlorhexidine 2% Cloths 1 Application(s) Topical daily  chlorhexidine 2% Cloths 1 Application(s) Topical daily  enoxaparin Injectable 50 milliGRAM(s) SubCutaneous every 12 hours  erythromycin   Ointment 1 Application(s) Both EYES daily  gabapentin 300 milliGRAM(s) Oral every 12 hours  levETIRAcetam  IVPB 500 milliGRAM(s) IV Intermittent two times a day  melatonin 3 milliGRAM(s) Oral at bedtime  meropenem  IVPB 1000 milliGRAM(s) IV Intermittent every 8 hours  midodrine. 20 milliGRAM(s) Oral three times a day  pantoprazole  Injectable 40 milliGRAM(s) IV Push every 24 hours  polyethylene glycol 3350 17 Gram(s) Oral at bedtime  raloxifene 60 milliGRAM(s) Oral daily  saccharomyces boulardii 250 milliGRAM(s) Oral two times a day  senna 2 Tablet(s) Oral at bedtime  sodium chloride 0.65% Nasal 2 Spray(s) Both Nostrils three times a day  sodium chloride 0.9% Bolus 500 milliLiter(s) IV Bolus once    MEDICATIONS  (PRN):  acetaminophen     Tablet .. 650 milliGRAM(s) Oral every 8 hours PRN Temp greater or equal to 38.5C (101.3F)  acetaminophen   IVPB .. 1000 milliGRAM(s) IV Intermittent once PRN Temp greater or equal to 38C (100.4F)  aluminum hydroxide/magnesium hydroxide/simethicone Suspension 30 milliLiter(s) Oral every 4 hours PRN Dyspepsia  ondansetron Injectable 4 milliGRAM(s) IV Push every 8 hours PRN Nausea and/or Vomiting    ANTIBIOTICS:  caspofungin IVPB      caspofungin IVPB 50 milliGRAM(s) IV Intermittent every 24 hours  meropenem  IVPB 1000 milliGRAM(s) IV Intermittent every 8 hours    VITALS:    ICU Vital Signs Last 24 Hrs  T(C): 36.7 (10 Mar 2024 05:01), Max: 37.4 (09 Mar 2024 21:14)  T(F): 98.1 (10 Mar 2024 05:01), Max: 99.3 (09 Mar 2024 21:14)  HR: 83 (10 Mar 2024 05:01) (83 - 95)  BP: 112/57 (10 Mar 2024 05:01) (112/57 - 116/57)  BP(mean): 77 (10 Mar 2024 05:01) (77 - 79)  ABP: --  ABP(mean): --  RR: 17 (10 Mar 2024 05:01) (17 - 19)  SpO2: 94% (10 Mar 2024 05:01) (94% - 95%)    O2 Parameters below as of 10 Mar 2024 05:01  Patient On (Oxygen Delivery Method): nasal cannula  O2 Flow (L/min): 3      PHYSICAL EXAM:  Gen: NAD, resting in bed  HEENT: Normocephalic, atraumatic  Neck: supple, no lymphadenopathy  CV: Regular rate & regular rhythm  Lungs: CTABL no wheeze  Abdomen: Soft, NTND+ BS present  Ext: Warm, well perfused no CCE  Neuro: non focal, awake, CN II-XII intact   Skin: no rash, no erythema  Psych: no SI, HI, Hallucination     LABS:                          7.5    10.99 )-----------( 491      ( 10 Mar 2024 08:30 )             25.4     03-10    142  |  101  |  14  ----------------------------<  117<H>  4.4   |  31  |  0.5<L>    Ca    9.3      10 Mar 2024 08:30  Mg     2.4     03-10    TPro  7.0  /  Alb  3.1<L>  /  TBili  0.2  /  DBili  x   /  AST  13  /  ALT  10  /  AlkPhos  96  03-10          MICROBIOLOGY:  Urinalysis Basic - ( 09 Mar 2024 08:05 )  Color: x / Appearance: x / SG: x / pH: x  Gluc: 133 mg/dL / Ketone: x  / Bili: x / Urobili: x   Blood: x / Protein: x / Nitrite: x   Leuk Esterase: x / RBC: x / WBC x   Sq Epi: x / Non Sq Epi: x / Bacteria: x    Culture - Blood (collected 03-07-24 @ 22:12)  Source: .Blood Blood-Peripheral  Preliminary Report (03-09-24 @ 07:01):    No growth at 24 hours      SARS-CoV-2: NotDetec (17 Feb 2024 19:06)  SARS-CoV-2: NotDetec (12 Feb 2024 10:28)  SARS-CoV-2: NotDetec (04 Feb 2024 10:28)  SARS-CoV-2: NotDetec (21 Jan 2024 00:58)  COVID-19 PCR: NotDetec (12 Oct 2023 09:14)    Fungitell: 73 pg/mL (02-23-24 @ 18:19)  MRSA PCR Result.: Negative (02-20-24 @ 13:42)  Fungitell: 76 pg/mL (02-19-24 @ 16:00)  MRSA PCR Result.: Negative (02-15-24 @ 06:20)  MRSA PCR Result.: Negative (02-12-24 @ 10:28)  Fungitell: 63 pg/mL (02-06-24 @ 11:27)  Fungitell: 65 pg/mL (02-06-24 @ 04:43)  MRSA PCR Result.: Negative (02-03-24 @ 21:32)  MRSA PCR Result.: Negative (01-22-24 @ 05:30)  Legionella Antigen, Urine: Negative (01-21-24 @ 05:00)  Fungitell: 325 pg/mL (01-20-24 @ 21:23)  Fungitell: 138 pg/mL (11-07-23 @ 08:08)  Fungitell: 115 pg/mL (11-02-23 @ 11:40)  MRSA PCR Result.: Negative (10-17-23 @ 17:20)  Fungitell: >500 pg/mL (10-17-23 @ 17:20)  Legionella Antigen, Urine: Negative (09-30-23 @ 14:36)  MRSA PCR Result.: Negative (09-29-23 @ 16:50)  MRSA PCR Result.: Negative (08-12-23 @ 06:10)  MRSA PCR Result.: Negative (08-04-23 @ 14:52)    IMAGING:  Xray Chest 1 View-PORTABLE IMMEDIATE (Xray Chest 1 View-PORTABLE IMMEDIATE .) (03.07.24 @ 22:20)  IMPRESSION:  NG tube terminating in left upper quadrant. NG tube coiled in the neck.  Bilateral opacities without significant change. No pneumothorax.  Stable cardiomediastinal silhouette.  Unchanged osseous structures.    CARDIOLOGY TESTING    ASSESSMENT  58F w/ Down Syndrome/Cerebral Palsy/Intellectual Disability(Non-verbal at baseline), Seizure Disorder, DVT admitted for Sepsis w/ Acute Respiratory Failure w/ Hypoxia 2/2 Pneumonia w/ Abscess & RSV.    IMPRESSION  - Acute Respiratory Failure w/ Hypoxia due to Lung Abscess and Pneumonia  - Pneumonia in setting of RSV  - Sepsis POA  - Hyperkalemia, resolved  - Down Syndrome/Cerebral Palsy/Intellectual Disability(Non-verbal at baseline), Seizure Disorder, DVT     PLAN:  - c/w course of Meropenem and Caspofungin per ID  - continues to require midodrine 20 TID  - per chart, patient is DNR/DNI (completed by 2 physician signature 2/29) and per chart if there is no improvement after treatment course outlined above, then will change level of care to CMO ( change in level of care was approved by [employer], paperwork in the paper chart)   - c/w therapeutic lovenox for hx of DVT  - c/w keppra, gabapentin for seizure disorder  - continues to require NGT for feeds    #Progress Note Handoff  Pending (specify):  continue to monitor response to treatment, if declining then per chart there is [facility] approval for CMO    #Supportive Management:  Dispo: From nursing facility/group home ([facility])   DVT Ppx: enoxaparin Injectable 50 milliGRAM(s) SubCutaneous every 12 hours  GI Ppx: pantoprazole  Injectable 40 milliGRAM(s) IV Push every 24 hours  Diet:  Diet, NPO with Tube Feed:   Tube Feeding Modality: Nasogastric  Jevity 1.2 Juventino  Total Volume for 24 Hours (mL): 1200  Bolus  Total Volume of Bolus (mL):  300  Tube Feed Frequency: Every 6 hours   Tube Feed Start Time: 00:00   Bolus Feed Duration (in Hours): 1.5  Free Water Flush  Bolus   Total Volume per Flush (mL): 250   Frequency: Every 6 Hours  Free Water Flush Instructions:  75mL water flush pre and then post feeds. Keep HOB >45 degress during feeds  No Carb Prosource TF     Qty per Day:  1 (02-12-24 @ 08:30) [Active]    Total time spent to complete patient's bedside assessment, review medical chart, discuss medical plan of care with covering medical team was more than 45 minutes with >50% of time spent face to face with patient, discussion with patient/family and/or coordination of care    Housestaff's notes reviewed. When there is confusion regarding the content or information provided in the notes of a housestaff (resident, medical student, physician assistant, or nurse practioner) and the attending physician notes, the attending physician's note takes precedence and supersedes the other notes.    Ruben James MD  Attending Physician

## 2024-03-11 LAB
ALBUMIN SERPL ELPH-MCNC: 3 G/DL — LOW (ref 3.5–5.2)
ALP SERPL-CCNC: 103 U/L — SIGNIFICANT CHANGE UP (ref 30–115)
ALT FLD-CCNC: 9 U/L — SIGNIFICANT CHANGE UP (ref 0–41)
ANION GAP SERPL CALC-SCNC: 9 MMOL/L — SIGNIFICANT CHANGE UP (ref 7–14)
AST SERPL-CCNC: 12 U/L — SIGNIFICANT CHANGE UP (ref 0–41)
BASOPHILS # BLD AUTO: 0.08 K/UL — SIGNIFICANT CHANGE UP (ref 0–0.2)
BASOPHILS NFR BLD AUTO: 0.9 % — SIGNIFICANT CHANGE UP (ref 0–1)
BILIRUB SERPL-MCNC: <0.2 MG/DL — SIGNIFICANT CHANGE UP (ref 0.2–1.2)
BUN SERPL-MCNC: 14 MG/DL — SIGNIFICANT CHANGE UP (ref 10–20)
CALCIUM SERPL-MCNC: 8.8 MG/DL — SIGNIFICANT CHANGE UP (ref 8.4–10.5)
CHLORIDE SERPL-SCNC: 98 MMOL/L — SIGNIFICANT CHANGE UP (ref 98–110)
CO2 SERPL-SCNC: 30 MMOL/L — SIGNIFICANT CHANGE UP (ref 17–32)
CREAT SERPL-MCNC: 0.5 MG/DL — LOW (ref 0.7–1.5)
EGFR: 109 ML/MIN/1.73M2 — SIGNIFICANT CHANGE UP
EOSINOPHIL # BLD AUTO: 0.35 K/UL — SIGNIFICANT CHANGE UP (ref 0–0.7)
EOSINOPHIL NFR BLD AUTO: 4.1 % — SIGNIFICANT CHANGE UP (ref 0–8)
GLUCOSE BLDC GLUCOMTR-MCNC: 115 MG/DL — HIGH (ref 70–99)
GLUCOSE SERPL-MCNC: 133 MG/DL — HIGH (ref 70–99)
HCT VFR BLD CALC: 26 % — LOW (ref 37–47)
HGB BLD-MCNC: 7.8 G/DL — LOW (ref 12–16)
IMM GRANULOCYTES NFR BLD AUTO: 0.5 % — HIGH (ref 0.1–0.3)
LYMPHOCYTES # BLD AUTO: 3.23 K/UL — SIGNIFICANT CHANGE UP (ref 1.2–3.4)
LYMPHOCYTES # BLD AUTO: 37.6 % — SIGNIFICANT CHANGE UP (ref 20.5–51.1)
MAGNESIUM SERPL-MCNC: 2.5 MG/DL — HIGH (ref 1.8–2.4)
MCHC RBC-ENTMCNC: 23.4 PG — LOW (ref 27–31)
MCHC RBC-ENTMCNC: 30 G/DL — LOW (ref 32–37)
MCV RBC AUTO: 77.8 FL — LOW (ref 81–99)
MONOCYTES # BLD AUTO: 0.62 K/UL — HIGH (ref 0.1–0.6)
MONOCYTES NFR BLD AUTO: 7.2 % — SIGNIFICANT CHANGE UP (ref 1.7–9.3)
NEUTROPHILS # BLD AUTO: 4.28 K/UL — SIGNIFICANT CHANGE UP (ref 1.4–6.5)
NEUTROPHILS NFR BLD AUTO: 49.7 % — SIGNIFICANT CHANGE UP (ref 42.2–75.2)
NRBC # BLD: 0 /100 WBCS — SIGNIFICANT CHANGE UP (ref 0–0)
PLATELET # BLD AUTO: 492 K/UL — HIGH (ref 130–400)
PMV BLD: 9.9 FL — SIGNIFICANT CHANGE UP (ref 7.4–10.4)
POTASSIUM SERPL-MCNC: 4.5 MMOL/L — SIGNIFICANT CHANGE UP (ref 3.5–5)
POTASSIUM SERPL-SCNC: 4.5 MMOL/L — SIGNIFICANT CHANGE UP (ref 3.5–5)
PROT SERPL-MCNC: 7 G/DL — SIGNIFICANT CHANGE UP (ref 6–8)
RBC # BLD: 3.34 M/UL — LOW (ref 4.2–5.4)
RBC # FLD: 21 % — HIGH (ref 11.5–14.5)
SODIUM SERPL-SCNC: 137 MMOL/L — SIGNIFICANT CHANGE UP (ref 135–146)
WBC # BLD: 8.6 K/UL — SIGNIFICANT CHANGE UP (ref 4.8–10.8)
WBC # FLD AUTO: 8.6 K/UL — SIGNIFICANT CHANGE UP (ref 4.8–10.8)

## 2024-03-11 PROCEDURE — 99232 SBSQ HOSP IP/OBS MODERATE 35: CPT

## 2024-03-11 RX ADMIN — Medication 3 MILLILITER(S): at 20:06

## 2024-03-11 RX ADMIN — MEROPENEM 100 MILLIGRAM(S): 1 INJECTION INTRAVENOUS at 05:25

## 2024-03-11 RX ADMIN — Medication 4 MILLILITER(S): at 14:13

## 2024-03-11 RX ADMIN — LEVETIRACETAM 400 MILLIGRAM(S): 250 TABLET, FILM COATED ORAL at 05:25

## 2024-03-11 RX ADMIN — Medication 1 TABLET(S): at 17:05

## 2024-03-11 RX ADMIN — MIDODRINE HYDROCHLORIDE 20 MILLIGRAM(S): 2.5 TABLET ORAL at 17:04

## 2024-03-11 RX ADMIN — PANTOPRAZOLE SODIUM 40 MILLIGRAM(S): 20 TABLET, DELAYED RELEASE ORAL at 05:23

## 2024-03-11 RX ADMIN — Medication 250 MILLIGRAM(S): at 05:24

## 2024-03-11 RX ADMIN — CHLORHEXIDINE GLUCONATE 1 APPLICATION(S): 213 SOLUTION TOPICAL at 11:32

## 2024-03-11 RX ADMIN — ENOXAPARIN SODIUM 50 MILLIGRAM(S): 100 INJECTION SUBCUTANEOUS at 05:23

## 2024-03-11 RX ADMIN — Medication 1 TABLET(S): at 05:23

## 2024-03-11 RX ADMIN — Medication 250 MILLIGRAM(S): at 17:05

## 2024-03-11 RX ADMIN — Medication 1 APPLICATION(S): at 17:13

## 2024-03-11 RX ADMIN — Medication 3 MILLILITER(S): at 05:14

## 2024-03-11 RX ADMIN — Medication 3 MILLILITER(S): at 08:16

## 2024-03-11 RX ADMIN — Medication 1 APPLICATION(S): at 07:32

## 2024-03-11 RX ADMIN — Medication 3 MILLILITER(S): at 14:13

## 2024-03-11 RX ADMIN — ENOXAPARIN SODIUM 50 MILLIGRAM(S): 100 INJECTION SUBCUTANEOUS at 17:04

## 2024-03-11 RX ADMIN — Medication 2 SPRAY(S): at 11:31

## 2024-03-11 RX ADMIN — MIDODRINE HYDROCHLORIDE 20 MILLIGRAM(S): 2.5 TABLET ORAL at 05:23

## 2024-03-11 RX ADMIN — RALOXIFENE HYDROCHLORIDE 60 MILLIGRAM(S): 60 TABLET, COATED ORAL at 11:33

## 2024-03-11 RX ADMIN — GABAPENTIN 300 MILLIGRAM(S): 400 CAPSULE ORAL at 05:23

## 2024-03-11 RX ADMIN — Medication 2 SPRAY(S): at 05:26

## 2024-03-11 RX ADMIN — Medication 2 SPRAY(S): at 21:21

## 2024-03-11 RX ADMIN — Medication 4 MILLILITER(S): at 05:15

## 2024-03-11 RX ADMIN — GABAPENTIN 300 MILLIGRAM(S): 400 CAPSULE ORAL at 17:05

## 2024-03-11 RX ADMIN — Medication 1 DROP(S): at 17:06

## 2024-03-11 RX ADMIN — Medication 4 MILLILITER(S): at 08:16

## 2024-03-11 RX ADMIN — Medication 1 DROP(S): at 07:33

## 2024-03-11 RX ADMIN — MIDODRINE HYDROCHLORIDE 20 MILLIGRAM(S): 2.5 TABLET ORAL at 11:33

## 2024-03-11 RX ADMIN — Medication 1 APPLICATION(S): at 11:33

## 2024-03-11 RX ADMIN — POLYETHYLENE GLYCOL 3350 17 GRAM(S): 17 POWDER, FOR SOLUTION ORAL at 21:22

## 2024-03-11 RX ADMIN — Medication 4 MILLILITER(S): at 20:06

## 2024-03-11 RX ADMIN — LEVETIRACETAM 400 MILLIGRAM(S): 250 TABLET, FILM COATED ORAL at 17:08

## 2024-03-11 RX ADMIN — Medication 3 MILLIGRAM(S): at 21:22

## 2024-03-11 RX ADMIN — SENNA PLUS 2 TABLET(S): 8.6 TABLET ORAL at 21:22

## 2024-03-11 NOTE — PROGRESS NOTE ADULT - ASSESSMENT
·	Persistent acute hypoxic respiratory failure , on increasing O2 requirement   ·	treated pneumonia and abscess   ·	treated sepsis   ·	Known with DVT  ·	Down's Sd and seizure disorder on AED   ·	Profound intellectual disability due to cerebral palsy     PLAN  ·	completed intravenous danna and caspofungin therapy duration as scheduled   ·	Assess for response in the upcoming 24-48 hours (re: O2 status and arousal)   ·	Continue with scheduled medications as noted above   ·	Advanced Directive: approved DNI/DNR and Comfort Measures Option if no substantial improvement after current therapy regimen (today day 1 after completion)   ·	Patient remains with poor prognosis overall despite all care and long term survival not expected     If no significant improvement in current respiratory status and O2 requirement; will need to consider end of life care inpatient vs outpatient (depending on O2 requirement)   Case discussed at length with multidisciplinary team on rounds.

## 2024-03-11 NOTE — PROGRESS NOTE ADULT - SUBJECTIVE AND OBJECTIVE BOX
TEA ECHAVARRIA  58y, Female  Allergy: No Known Allergies    Hospital Day: 51d    Patient seen and examined earlier today.   Aid person at bedside     PMH/PSH:    Down syndrome  Osteoporosis  Mild anemia  Neuropathy  S/P debridement  of R hip on 3/2/21    LAST 24-Hr EVENTS:  no clinical events reported     VITALS:  T(F): 97.6 (03-11-24 @ 05:54), Max: 98.2 (03-10-24 @ 21:36)  HR: 85 (03-11-24 @ 05:54)  BP: 120/59 (03-11-24 @ 05:54) (116/56 - 120/59)  RR: 18 (03-11-24 @ 05:54)  SpO2: 97% (03-11-24 @ 05:54) on O2 Venturi Mask       TESTS & MEASUREMENTS:  Weight/Height/BMI      03-10-24 @ 07:01  -  03-11-24 @ 07:00  --------------------------------------------------------  IN: 900 mL / OUT: 0 mL / NET: 900 mL    03-11-24 @ 07:01  -  03-11-24 @ 14:40  --------------------------------------------------------  IN: 450 mL / OUT: 0 mL / NET: 450 mL                            7.8    8.60  )-----------( 492      ( 11 Mar 2024 08:56 )             26.0         03-11    137  |  98  |  14  ----------------------------<  133<H>  4.5   |  30  |  0.5<L>    Ca    8.8      11 Mar 2024 08:56  Mg     2.5     03-11    TPro  7.0  /  Alb  3.0<L>  /  TBili  <0.2  /  DBili  x   /  AST  12  /  ALT  9   /  AlkPhos  103  03-11    LIVER FUNCTIONS - ( 11 Mar 2024 08:56 )  Alb: 3.0 g/dL / Pro: 7.0 g/dL / ALK PHOS: 103 U/L / ALT: 9 U/L / AST: 12 U/L / GGT: x               Culture - Blood (collected 03-08-24 @ 08:30)  Source: .Blood None  Preliminary Report (03-11-24 @ 14:01):    No growth at 72 Hours    Culture - Blood (collected 03-07-24 @ 22:12)  Source: .Blood Blood-Peripheral  Preliminary Report (03-11-24 @ 07:01):    No growth at 72 Hours      Urinalysis Basic - ( 11 Mar 2024 08:56 )    Color: x / Appearance: x / SG: x / pH: x  Gluc: 133 mg/dL / Ketone: x  / Bili: x / Urobili: x   Blood: x / Protein: x / Nitrite: x   Leuk Esterase: x / RBC: x / WBC x   Sq Epi: x / Non Sq Epi: x / Bacteria: x        RADIOLOGY, ECG, & ADDITIONAL TESTS:  12 Lead ECG:   Ventricular Rate 124 BPM  QTC Calculation(Bazett) 471 ms  Sinus tachycardia  Possible Left atrial enlargement  Borderline ECG    Confirmed by MARJAN MENDES MD ([id number]) on 2/23/2024 1:23:43 PM (02-23-24 @ 11:46)      MEDICATIONS:  MEDICATIONS  (STANDING):  acetylcysteine 20%  Inhalation 4 milliLiter(s) Inhalation every 6 hours  albuterol/ipratropium for Nebulization 3 milliLiter(s) Nebulizer every 6 hours  AQUAPHOR (petrolatum Ointment) 1 Application(s) Topical two times a day  artificial  tears Solution 1 Drop(s) Both EYES two times a day  calcium carbonate   1250 mG (OsCal) 1 Tablet(s) Oral two times a day  chlorhexidine 2% Cloths 1 Application(s) Topical daily  chlorhexidine 2% Cloths 1 Application(s) Topical daily  enoxaparin Injectable 50 milliGRAM(s) SubCutaneous every 12 hours  erythromycin   Ointment 1 Application(s) Both EYES daily  gabapentin 300 milliGRAM(s) Oral every 12 hours  levETIRAcetam  IVPB 500 milliGRAM(s) IV Intermittent two times a day  melatonin 3 milliGRAM(s) Oral at bedtime  midodrine. 20 milliGRAM(s) Oral three times a day  pantoprazole  Injectable 40 milliGRAM(s) IV Push every 24 hours  polyethylene glycol 3350 17 Gram(s) Oral at bedtime  raloxifene 60 milliGRAM(s) Oral daily  saccharomyces boulardii 250 milliGRAM(s) Oral two times a day  senna 2 Tablet(s) Oral at bedtime  sodium chloride 0.65% Nasal 2 Spray(s) Both Nostrils three times a day  sodium chloride 0.9% Bolus 500 milliLiter(s) IV Bolus once    MEDICATIONS  (PRN):  acetaminophen     Tablet .. 650 milliGRAM(s) Oral every 8 hours PRN Temp greater or equal to 38.5C (101.3F)  acetaminophen   IVPB .. 1000 milliGRAM(s) IV Intermittent once PRN Temp greater or equal to 38C (100.4F)  aluminum hydroxide/magnesium hydroxide/simethicone Suspension 30 milliLiter(s) Oral every 4 hours PRN Dyspepsia  ondansetron Injectable 4 milliGRAM(s) IV Push every 8 hours PRN Nausea and/or Vomiting      HOME MEDICATIONS (as per chart review):  apixaban 5 mg oral tablet (11-21)  docusate sodium 100 mg oral capsule (01-20)  gabapentin 300 mg oral tablet (01-20)  levETIRAcetam 500 mg oral tablet (11-21)  Melatonin 3 mg oral tablet (11-20)  midodrine 10 mg oral tablet (11-21)  ocular lubricant ophthalmic solution (11-21)  Oyster Shell 500 (1250 mg calcium carbonate) oral tablet (01-20)  Protonix 40 mg oral delayed release tablet (01-20)  raloxifene 60 mg oral tablet (01-20)      PHYSICAL EXAM:  GENERAL: in no apparent distress, opens eyes spontaneously   CHEST/LUNG: good air entry without audible wheezes on ant ausc  HEART: S1S2  ABDOMEN: S1S2  EXTREMITIES:  contracted/ dressing LE with some limited dried bloody area

## 2024-03-12 LAB
ALBUMIN SERPL ELPH-MCNC: 3.3 G/DL — LOW (ref 3.5–5.2)
ALP SERPL-CCNC: 106 U/L — SIGNIFICANT CHANGE UP (ref 30–115)
ALT FLD-CCNC: 10 U/L — SIGNIFICANT CHANGE UP (ref 0–41)
ANION GAP SERPL CALC-SCNC: 8 MMOL/L — SIGNIFICANT CHANGE UP (ref 7–14)
AST SERPL-CCNC: 14 U/L — SIGNIFICANT CHANGE UP (ref 0–41)
BASOPHILS # BLD AUTO: 0.08 K/UL — SIGNIFICANT CHANGE UP (ref 0–0.2)
BASOPHILS NFR BLD AUTO: 1.1 % — HIGH (ref 0–1)
BILIRUB SERPL-MCNC: 0.2 MG/DL — SIGNIFICANT CHANGE UP (ref 0.2–1.2)
BUN SERPL-MCNC: 15 MG/DL — SIGNIFICANT CHANGE UP (ref 10–20)
CALCIUM SERPL-MCNC: 9.5 MG/DL — SIGNIFICANT CHANGE UP (ref 8.4–10.4)
CHLORIDE SERPL-SCNC: 97 MMOL/L — LOW (ref 98–110)
CO2 SERPL-SCNC: 30 MMOL/L — SIGNIFICANT CHANGE UP (ref 17–32)
CREAT SERPL-MCNC: 0.5 MG/DL — LOW (ref 0.7–1.5)
EGFR: 109 ML/MIN/1.73M2 — SIGNIFICANT CHANGE UP
EOSINOPHIL # BLD AUTO: 0.29 K/UL — SIGNIFICANT CHANGE UP (ref 0–0.7)
EOSINOPHIL NFR BLD AUTO: 4.1 % — SIGNIFICANT CHANGE UP (ref 0–8)
GLUCOSE BLDC GLUCOMTR-MCNC: 117 MG/DL — HIGH (ref 70–99)
GLUCOSE BLDC GLUCOMTR-MCNC: 131 MG/DL — HIGH (ref 70–99)
GLUCOSE BLDC GLUCOMTR-MCNC: 166 MG/DL — HIGH (ref 70–99)
GLUCOSE SERPL-MCNC: 159 MG/DL — HIGH (ref 70–99)
HCT VFR BLD CALC: 27 % — LOW (ref 37–47)
HGB BLD-MCNC: 8 G/DL — LOW (ref 12–16)
IMM GRANULOCYTES NFR BLD AUTO: 0.3 % — SIGNIFICANT CHANGE UP (ref 0.1–0.3)
LYMPHOCYTES # BLD AUTO: 1.98 K/UL — SIGNIFICANT CHANGE UP (ref 1.2–3.4)
LYMPHOCYTES # BLD AUTO: 27.8 % — SIGNIFICANT CHANGE UP (ref 20.5–51.1)
MAGNESIUM SERPL-MCNC: 2.5 MG/DL — HIGH (ref 1.8–2.4)
MCHC RBC-ENTMCNC: 23 PG — LOW (ref 27–31)
MCHC RBC-ENTMCNC: 29.6 G/DL — LOW (ref 32–37)
MCV RBC AUTO: 77.6 FL — LOW (ref 81–99)
MONOCYTES # BLD AUTO: 0.46 K/UL — SIGNIFICANT CHANGE UP (ref 0.1–0.6)
MONOCYTES NFR BLD AUTO: 6.5 % — SIGNIFICANT CHANGE UP (ref 1.7–9.3)
NEUTROPHILS # BLD AUTO: 4.28 K/UL — SIGNIFICANT CHANGE UP (ref 1.4–6.5)
NEUTROPHILS NFR BLD AUTO: 60.2 % — SIGNIFICANT CHANGE UP (ref 42.2–75.2)
NRBC # BLD: 0 /100 WBCS — SIGNIFICANT CHANGE UP (ref 0–0)
PLATELET # BLD AUTO: 537 K/UL — HIGH (ref 130–400)
PMV BLD: 9.7 FL — SIGNIFICANT CHANGE UP (ref 7.4–10.4)
POTASSIUM SERPL-MCNC: 4.8 MMOL/L — SIGNIFICANT CHANGE UP (ref 3.5–5)
POTASSIUM SERPL-SCNC: 4.8 MMOL/L — SIGNIFICANT CHANGE UP (ref 3.5–5)
PROT SERPL-MCNC: 7.4 G/DL — SIGNIFICANT CHANGE UP (ref 6–8)
RBC # BLD: 3.48 M/UL — LOW (ref 4.2–5.4)
RBC # FLD: 20.5 % — HIGH (ref 11.5–14.5)
SODIUM SERPL-SCNC: 135 MMOL/L — SIGNIFICANT CHANGE UP (ref 135–146)
WBC # BLD: 7.11 K/UL — SIGNIFICANT CHANGE UP (ref 4.8–10.8)
WBC # FLD AUTO: 7.11 K/UL — SIGNIFICANT CHANGE UP (ref 4.8–10.8)

## 2024-03-12 PROCEDURE — 99232 SBSQ HOSP IP/OBS MODERATE 35: CPT

## 2024-03-12 RX ADMIN — Medication 1 APPLICATION(S): at 17:20

## 2024-03-12 RX ADMIN — Medication 3 MILLIGRAM(S): at 22:00

## 2024-03-12 RX ADMIN — Medication 1 APPLICATION(S): at 11:46

## 2024-03-12 RX ADMIN — Medication 4 MILLILITER(S): at 14:28

## 2024-03-12 RX ADMIN — RALOXIFENE HYDROCHLORIDE 60 MILLIGRAM(S): 60 TABLET, COATED ORAL at 11:45

## 2024-03-12 RX ADMIN — Medication 3 MILLILITER(S): at 08:10

## 2024-03-12 RX ADMIN — Medication 1 TABLET(S): at 17:15

## 2024-03-12 RX ADMIN — Medication 2 SPRAY(S): at 22:00

## 2024-03-12 RX ADMIN — MIDODRINE HYDROCHLORIDE 20 MILLIGRAM(S): 2.5 TABLET ORAL at 11:45

## 2024-03-12 RX ADMIN — Medication 250 MILLIGRAM(S): at 05:41

## 2024-03-12 RX ADMIN — Medication 1 DROP(S): at 17:15

## 2024-03-12 RX ADMIN — MIDODRINE HYDROCHLORIDE 20 MILLIGRAM(S): 2.5 TABLET ORAL at 05:41

## 2024-03-12 RX ADMIN — LEVETIRACETAM 400 MILLIGRAM(S): 250 TABLET, FILM COATED ORAL at 17:14

## 2024-03-12 RX ADMIN — Medication 2 SPRAY(S): at 07:07

## 2024-03-12 RX ADMIN — Medication 4 MILLILITER(S): at 05:21

## 2024-03-12 RX ADMIN — Medication 4 MILLILITER(S): at 08:10

## 2024-03-12 RX ADMIN — Medication 3 MILLILITER(S): at 05:20

## 2024-03-12 RX ADMIN — GABAPENTIN 300 MILLIGRAM(S): 400 CAPSULE ORAL at 17:16

## 2024-03-12 RX ADMIN — ENOXAPARIN SODIUM 50 MILLIGRAM(S): 100 INJECTION SUBCUTANEOUS at 17:19

## 2024-03-12 RX ADMIN — Medication 3 MILLILITER(S): at 22:10

## 2024-03-12 RX ADMIN — POLYETHYLENE GLYCOL 3350 17 GRAM(S): 17 POWDER, FOR SOLUTION ORAL at 22:11

## 2024-03-12 RX ADMIN — Medication 1 APPLICATION(S): at 07:06

## 2024-03-12 RX ADMIN — PANTOPRAZOLE SODIUM 40 MILLIGRAM(S): 20 TABLET, DELAYED RELEASE ORAL at 05:41

## 2024-03-12 RX ADMIN — ENOXAPARIN SODIUM 50 MILLIGRAM(S): 100 INJECTION SUBCUTANEOUS at 05:42

## 2024-03-12 RX ADMIN — LEVETIRACETAM 400 MILLIGRAM(S): 250 TABLET, FILM COATED ORAL at 05:42

## 2024-03-12 RX ADMIN — Medication 1 TABLET(S): at 05:42

## 2024-03-12 RX ADMIN — GABAPENTIN 300 MILLIGRAM(S): 400 CAPSULE ORAL at 05:41

## 2024-03-12 RX ADMIN — Medication 2 SPRAY(S): at 14:30

## 2024-03-12 RX ADMIN — MIDODRINE HYDROCHLORIDE 20 MILLIGRAM(S): 2.5 TABLET ORAL at 17:18

## 2024-03-12 RX ADMIN — Medication 3 MILLILITER(S): at 14:28

## 2024-03-12 RX ADMIN — Medication 1 DROP(S): at 07:07

## 2024-03-12 RX ADMIN — Medication 250 MILLIGRAM(S): at 17:16

## 2024-03-12 RX ADMIN — SENNA PLUS 2 TABLET(S): 8.6 TABLET ORAL at 22:00

## 2024-03-12 RX ADMIN — CHLORHEXIDINE GLUCONATE 1 APPLICATION(S): 213 SOLUTION TOPICAL at 11:45

## 2024-03-12 RX ADMIN — Medication 4 MILLILITER(S): at 22:00

## 2024-03-12 NOTE — PROGRESS NOTE ADULT - SUBJECTIVE AND OBJECTIVE BOX
Pt doing well. On 3L O2. GH aid bedside.       PMH/PSH:    Down syndrome  Osteoporosis  Mild anemia  Neuropathy  S/P debridement  of R hip on 3/2/21    LAST 24-Hr EVENTS:  no clinical events reported     VITALS:  T(F): 97.6 (03-11-24 @ 05:54), Max: 98.2 (03-10-24 @ 21:36)  HR: 85 (03-11-24 @ 05:54)  BP: 120/59 (03-11-24 @ 05:54) (116/56 - 120/59)  RR: 18 (03-11-24 @ 05:54)  SpO2: 97% (03-11-24 @ 05:54) on O2 Venturi Mask       TESTS & MEASUREMENTS:  Weight/Height/BMI      03-10-24 @ 07:01  -  03-11-24 @ 07:00  --------------------------------------------------------  IN: 900 mL / OUT: 0 mL / NET: 900 mL    03-11-24 @ 07:01  -  03-11-24 @ 14:40  --------------------------------------------------------  IN: 450 mL / OUT: 0 mL / NET: 450 mL                            7.8    8.60  )-----------( 492      ( 11 Mar 2024 08:56 )             26.0         03-11    137  |  98  |  14  ----------------------------<  133<H>  4.5   |  30  |  0.5<L>    Ca    8.8      11 Mar 2024 08:56  Mg     2.5     03-11    TPro  7.0  /  Alb  3.0<L>  /  TBili  <0.2  /  DBili  x   /  AST  12  /  ALT  9   /  AlkPhos  103  03-11    LIVER FUNCTIONS - ( 11 Mar 2024 08:56 )  Alb: 3.0 g/dL / Pro: 7.0 g/dL / ALK PHOS: 103 U/L / ALT: 9 U/L / AST: 12 U/L / GGT: x               Culture - Blood (collected 03-08-24 @ 08:30)  Source: .Blood None  Preliminary Report (03-11-24 @ 14:01):    No growth at 72 Hours    Culture - Blood (collected 03-07-24 @ 22:12)  Source: .Blood Blood-Peripheral  Preliminary Report (03-11-24 @ 07:01):    No growth at 72 Hours      Urinalysis Basic - ( 11 Mar 2024 08:56 )    Color: x / Appearance: x / SG: x / pH: x  Gluc: 133 mg/dL / Ketone: x  / Bili: x / Urobili: x   Blood: x / Protein: x / Nitrite: x   Leuk Esterase: x / RBC: x / WBC x   Sq Epi: x / Non Sq Epi: x / Bacteria: x        RADIOLOGY, ECG, & ADDITIONAL TESTS:  12 Lead ECG:   Ventricular Rate 124 BPM  QTC Calculation(Bazett) 471 ms  Sinus tachycardia  Possible Left atrial enlargement  Borderline ECG    Confirmed by MARJAN EMNDES MD ([id number]) on 2/23/2024 1:23:43 PM (02-23-24 @ 11:46)      MEDICATIONS:  MEDICATIONS  (STANDING):  acetylcysteine 20%  Inhalation 4 milliLiter(s) Inhalation every 6 hours  albuterol/ipratropium for Nebulization 3 milliLiter(s) Nebulizer every 6 hours  AQUAPHOR (petrolatum Ointment) 1 Application(s) Topical two times a day  artificial  tears Solution 1 Drop(s) Both EYES two times a day  calcium carbonate   1250 mG (OsCal) 1 Tablet(s) Oral two times a day  chlorhexidine 2% Cloths 1 Application(s) Topical daily  chlorhexidine 2% Cloths 1 Application(s) Topical daily  enoxaparin Injectable 50 milliGRAM(s) SubCutaneous every 12 hours  erythromycin   Ointment 1 Application(s) Both EYES daily  gabapentin 300 milliGRAM(s) Oral every 12 hours  levETIRAcetam  IVPB 500 milliGRAM(s) IV Intermittent two times a day  melatonin 3 milliGRAM(s) Oral at bedtime  midodrine. 20 milliGRAM(s) Oral three times a day  pantoprazole  Injectable 40 milliGRAM(s) IV Push every 24 hours  polyethylene glycol 3350 17 Gram(s) Oral at bedtime  raloxifene 60 milliGRAM(s) Oral daily  saccharomyces boulardii 250 milliGRAM(s) Oral two times a day  senna 2 Tablet(s) Oral at bedtime  sodium chloride 0.65% Nasal 2 Spray(s) Both Nostrils three times a day  sodium chloride 0.9% Bolus 500 milliLiter(s) IV Bolus once    MEDICATIONS  (PRN):  acetaminophen     Tablet .. 650 milliGRAM(s) Oral every 8 hours PRN Temp greater or equal to 38.5C (101.3F)  acetaminophen   IVPB .. 1000 milliGRAM(s) IV Intermittent once PRN Temp greater or equal to 38C (100.4F)  aluminum hydroxide/magnesium hydroxide/simethicone Suspension 30 milliLiter(s) Oral every 4 hours PRN Dyspepsia  ondansetron Injectable 4 milliGRAM(s) IV Push every 8 hours PRN Nausea and/or Vomiting      HOME MEDICATIONS (as per chart review):  apixaban 5 mg oral tablet (11-21)  docusate sodium 100 mg oral capsule (01-20)  gabapentin 300 mg oral tablet (01-20)  levETIRAcetam 500 mg oral tablet (11-21)  Melatonin 3 mg oral tablet (11-20)  midodrine 10 mg oral tablet (11-21)  ocular lubricant ophthalmic solution (11-21)  Oyster Shell 500 (1250 mg calcium carbonate) oral tablet (01-20)  Protonix 40 mg oral delayed release tablet (01-20)  raloxifene 60 mg oral tablet (01-20)      PHYSICAL EXAM:  GENERAL: in no apparent distress, opens eyes spontaneously   CHEST/LUNG: good air entry without audible wheezes on ant ausc  HEART: S1S2  ABDOMEN: S1S2  EXTREMITIES:  contracted/ dressing LE with some limited dried bloody area

## 2024-03-12 NOTE — PROGRESS NOTE ADULT - ASSESSMENT
·	Persistent acute hypoxic respiratory failure , on increasing O2 requirement   ·	treated pneumonia and abscess   ·	treated sepsis   ·	Known with DVT  ·	Down's Sd and seizure disorder on AED   ·	Profound intellectual disability due to cerebral palsy     PLAN  ·	completed intravenous danna and caspofungin therapy duration as scheduled   ·	Assess for response in the upcoming 48 hours (re: O2 status and arousal)   ·	Continue with scheduled medications as noted above   ·	Advanced Directive: approved DNI/DNR and Comfort Measures Option if no substantial improvement after abx completed on 3/10/24  ·	Patient remains with poor prognosis overall despite all care and long term survival not expected     If no significant improvement in current respiratory status and O2 requirement; will need to consider end of life care inpatient vs outpatient (depending on O2 requirement)   Case discussed at length with multidisciplinary team on rounds.

## 2024-03-13 DIAGNOSIS — R13.10 DYSPHAGIA, UNSPECIFIED: ICD-10-CM

## 2024-03-13 DIAGNOSIS — Z71.89 OTHER SPECIFIED COUNSELING: ICD-10-CM

## 2024-03-13 DIAGNOSIS — Z51.5 ENCOUNTER FOR PALLIATIVE CARE: ICD-10-CM

## 2024-03-13 LAB
ALBUMIN SERPL ELPH-MCNC: 3.4 G/DL — LOW (ref 3.5–5.2)
ALP SERPL-CCNC: 108 U/L — SIGNIFICANT CHANGE UP (ref 30–115)
ALT FLD-CCNC: 13 U/L — SIGNIFICANT CHANGE UP (ref 0–41)
ANION GAP SERPL CALC-SCNC: 10 MMOL/L — SIGNIFICANT CHANGE UP (ref 7–14)
ANION GAP SERPL CALC-SCNC: 11 MMOL/L — SIGNIFICANT CHANGE UP (ref 7–14)
AST SERPL-CCNC: 17 U/L — SIGNIFICANT CHANGE UP (ref 0–41)
BASE EXCESS BLDA CALC-SCNC: 8.5 MMOL/L — HIGH (ref -2–3)
BASOPHILS # BLD AUTO: 0.08 K/UL — SIGNIFICANT CHANGE UP (ref 0–0.2)
BASOPHILS NFR BLD AUTO: 0.6 % — SIGNIFICANT CHANGE UP (ref 0–1)
BILIRUB SERPL-MCNC: 0.3 MG/DL — SIGNIFICANT CHANGE UP (ref 0.2–1.2)
BUN SERPL-MCNC: 16 MG/DL — SIGNIFICANT CHANGE UP (ref 10–20)
BUN SERPL-MCNC: 17 MG/DL — SIGNIFICANT CHANGE UP (ref 10–20)
CALCIUM SERPL-MCNC: 9.1 MG/DL — SIGNIFICANT CHANGE UP (ref 8.4–10.5)
CALCIUM SERPL-MCNC: 9.5 MG/DL — SIGNIFICANT CHANGE UP (ref 8.4–10.4)
CHLORIDE SERPL-SCNC: 97 MMOL/L — LOW (ref 98–110)
CHLORIDE SERPL-SCNC: 99 MMOL/L — SIGNIFICANT CHANGE UP (ref 98–110)
CO2 SERPL-SCNC: 29 MMOL/L — SIGNIFICANT CHANGE UP (ref 17–32)
CO2 SERPL-SCNC: 30 MMOL/L — SIGNIFICANT CHANGE UP (ref 17–32)
CREAT SERPL-MCNC: 0.5 MG/DL — LOW (ref 0.7–1.5)
CREAT SERPL-MCNC: 0.6 MG/DL — LOW (ref 0.7–1.5)
CULTURE RESULTS: SIGNIFICANT CHANGE UP
CULTURE RESULTS: SIGNIFICANT CHANGE UP
D DIMER BLD IA.RAPID-MCNC: 397 NG/ML DDU — HIGH
EGFR: 105 ML/MIN/1.73M2 — SIGNIFICANT CHANGE UP
EGFR: 109 ML/MIN/1.73M2 — SIGNIFICANT CHANGE UP
EOSINOPHIL # BLD AUTO: 0.18 K/UL — SIGNIFICANT CHANGE UP (ref 0–0.7)
EOSINOPHIL NFR BLD AUTO: 1.3 % — SIGNIFICANT CHANGE UP (ref 0–8)
GAS PNL BLDA: SIGNIFICANT CHANGE UP
GLUCOSE BLDC GLUCOMTR-MCNC: 118 MG/DL — HIGH (ref 70–99)
GLUCOSE BLDC GLUCOMTR-MCNC: 149 MG/DL — HIGH (ref 70–99)
GLUCOSE BLDC GLUCOMTR-MCNC: 168 MG/DL — HIGH (ref 70–99)
GLUCOSE BLDC GLUCOMTR-MCNC: 171 MG/DL — HIGH (ref 70–99)
GLUCOSE SERPL-MCNC: 130 MG/DL — HIGH (ref 70–99)
GLUCOSE SERPL-MCNC: 162 MG/DL — HIGH (ref 70–99)
HCO3 BLDA-SCNC: 33 MMOL/L — HIGH (ref 21–28)
HCT VFR BLD CALC: 27.5 % — LOW (ref 37–47)
HCT VFR BLD CALC: 28.6 % — LOW (ref 37–47)
HGB BLD-MCNC: 8.5 G/DL — LOW (ref 12–16)
HGB BLD-MCNC: 8.7 G/DL — LOW (ref 12–16)
IMM GRANULOCYTES NFR BLD AUTO: 0.4 % — HIGH (ref 0.1–0.3)
LACTATE SERPL-SCNC: 1.7 MMOL/L — SIGNIFICANT CHANGE UP (ref 0.7–2)
LYMPHOCYTES # BLD AUTO: 1.93 K/UL — SIGNIFICANT CHANGE UP (ref 1.2–3.4)
LYMPHOCYTES # BLD AUTO: 13.8 % — LOW (ref 20.5–51.1)
MAGNESIUM SERPL-MCNC: 2.5 MG/DL — HIGH (ref 1.8–2.4)
MAGNESIUM SERPL-MCNC: 2.5 MG/DL — HIGH (ref 1.8–2.4)
MCHC RBC-ENTMCNC: 23.3 PG — LOW (ref 27–31)
MCHC RBC-ENTMCNC: 23.5 PG — LOW (ref 27–31)
MCHC RBC-ENTMCNC: 30.4 G/DL — LOW (ref 32–37)
MCHC RBC-ENTMCNC: 30.9 G/DL — LOW (ref 32–37)
MCV RBC AUTO: 76.2 FL — LOW (ref 81–99)
MCV RBC AUTO: 76.7 FL — LOW (ref 81–99)
MONOCYTES # BLD AUTO: 0.57 K/UL — SIGNIFICANT CHANGE UP (ref 0.1–0.6)
MONOCYTES NFR BLD AUTO: 4.1 % — SIGNIFICANT CHANGE UP (ref 1.7–9.3)
NEUTROPHILS # BLD AUTO: 11.17 K/UL — HIGH (ref 1.4–6.5)
NEUTROPHILS NFR BLD AUTO: 79.8 % — HIGH (ref 42.2–75.2)
NRBC # BLD: 0 /100 WBCS — SIGNIFICANT CHANGE UP (ref 0–0)
NRBC # BLD: 0 /100 WBCS — SIGNIFICANT CHANGE UP (ref 0–0)
NT-PROBNP SERPL-SCNC: 116 PG/ML — SIGNIFICANT CHANGE UP (ref 0–300)
PCO2 BLDA: 43 MMHG — SIGNIFICANT CHANGE UP (ref 32–45)
PH BLDA: 7.49 — HIGH (ref 7.35–7.45)
PLATELET # BLD AUTO: 587 K/UL — HIGH (ref 130–400)
PLATELET # BLD AUTO: 622 K/UL — HIGH (ref 130–400)
PMV BLD: 10 FL — SIGNIFICANT CHANGE UP (ref 7.4–10.4)
PMV BLD: 9.7 FL — SIGNIFICANT CHANGE UP (ref 7.4–10.4)
PO2 BLDA: 71 MMHG — LOW (ref 83–108)
POTASSIUM SERPL-MCNC: 4.4 MMOL/L — SIGNIFICANT CHANGE UP (ref 3.5–5)
POTASSIUM SERPL-MCNC: 4.7 MMOL/L — SIGNIFICANT CHANGE UP (ref 3.5–5)
POTASSIUM SERPL-SCNC: 4.4 MMOL/L — SIGNIFICANT CHANGE UP (ref 3.5–5)
POTASSIUM SERPL-SCNC: 4.7 MMOL/L — SIGNIFICANT CHANGE UP (ref 3.5–5)
PROCALCITONIN SERPL-MCNC: 0.19 NG/ML — HIGH (ref 0.02–0.1)
PROT SERPL-MCNC: 7.6 G/DL — SIGNIFICANT CHANGE UP (ref 6–8)
RBC # BLD: 3.61 M/UL — LOW (ref 4.2–5.4)
RBC # BLD: 3.73 M/UL — LOW (ref 4.2–5.4)
RBC # FLD: 20.8 % — HIGH (ref 11.5–14.5)
RBC # FLD: 20.8 % — HIGH (ref 11.5–14.5)
SAO2 % BLDA: 95.6 % — SIGNIFICANT CHANGE UP (ref 94–98)
SODIUM SERPL-SCNC: 138 MMOL/L — SIGNIFICANT CHANGE UP (ref 135–146)
SODIUM SERPL-SCNC: 138 MMOL/L — SIGNIFICANT CHANGE UP (ref 135–146)
SPECIMEN SOURCE: SIGNIFICANT CHANGE UP
SPECIMEN SOURCE: SIGNIFICANT CHANGE UP
TROPONIN T, HIGH SENSITIVITY RESULT: 16 NG/L — HIGH (ref 6–13)
WBC # BLD: 13.97 K/UL — HIGH (ref 4.8–10.8)
WBC # BLD: 13.99 K/UL — HIGH (ref 4.8–10.8)
WBC # FLD AUTO: 13.97 K/UL — HIGH (ref 4.8–10.8)
WBC # FLD AUTO: 13.99 K/UL — HIGH (ref 4.8–10.8)

## 2024-03-13 PROCEDURE — 93010 ELECTROCARDIOGRAM REPORT: CPT | Mod: 77

## 2024-03-13 PROCEDURE — 93010 ELECTROCARDIOGRAM REPORT: CPT

## 2024-03-13 PROCEDURE — 71045 X-RAY EXAM CHEST 1 VIEW: CPT | Mod: 26,77

## 2024-03-13 PROCEDURE — 71045 X-RAY EXAM CHEST 1 VIEW: CPT | Mod: 26

## 2024-03-13 PROCEDURE — 71260 CT THORAX DX C+: CPT | Mod: 26

## 2024-03-13 PROCEDURE — 99233 SBSQ HOSP IP/OBS HIGH 50: CPT

## 2024-03-13 RX ORDER — FUROSEMIDE 40 MG
20 TABLET ORAL
Refills: 0 | Status: DISCONTINUED | OUTPATIENT
Start: 2024-03-13 | End: 2024-03-14

## 2024-03-13 RX ORDER — MEROPENEM 1 G/30ML
INJECTION INTRAVENOUS
Refills: 0 | Status: DISCONTINUED | OUTPATIENT
Start: 2024-03-13 | End: 2024-03-13

## 2024-03-13 RX ORDER — FUROSEMIDE 40 MG
20 TABLET ORAL ONCE
Refills: 0 | Status: COMPLETED | OUTPATIENT
Start: 2024-03-13 | End: 2024-03-13

## 2024-03-13 RX ORDER — MEROPENEM 1 G/30ML
1000 INJECTION INTRAVENOUS EVERY 8 HOURS
Refills: 0 | Status: DISCONTINUED | OUTPATIENT
Start: 2024-03-13 | End: 2024-03-21

## 2024-03-13 RX ORDER — VANCOMYCIN HCL 1 G
750 VIAL (EA) INTRAVENOUS EVERY 12 HOURS
Refills: 0 | Status: DISCONTINUED | OUTPATIENT
Start: 2024-03-13 | End: 2024-03-21

## 2024-03-13 RX ORDER — MEROPENEM 1 G/30ML
1000 INJECTION INTRAVENOUS ONCE
Refills: 0 | Status: COMPLETED | OUTPATIENT
Start: 2024-03-13 | End: 2024-03-13

## 2024-03-13 RX ADMIN — POLYETHYLENE GLYCOL 3350 17 GRAM(S): 17 POWDER, FOR SOLUTION ORAL at 21:29

## 2024-03-13 RX ADMIN — Medication 1 TABLET(S): at 17:28

## 2024-03-13 RX ADMIN — CHLORHEXIDINE GLUCONATE 1 APPLICATION(S): 213 SOLUTION TOPICAL at 11:49

## 2024-03-13 RX ADMIN — Medication 650 MILLIGRAM(S): at 23:59

## 2024-03-13 RX ADMIN — MIDODRINE HYDROCHLORIDE 20 MILLIGRAM(S): 2.5 TABLET ORAL at 11:50

## 2024-03-13 RX ADMIN — Medication 250 MILLIGRAM(S): at 11:47

## 2024-03-13 RX ADMIN — MEROPENEM 100 MILLIGRAM(S): 1 INJECTION INTRAVENOUS at 11:47

## 2024-03-13 RX ADMIN — Medication 4 MILLILITER(S): at 13:13

## 2024-03-13 RX ADMIN — Medication 1 APPLICATION(S): at 05:52

## 2024-03-13 RX ADMIN — Medication 3 MILLILITER(S): at 21:00

## 2024-03-13 RX ADMIN — Medication 3 MILLIGRAM(S): at 21:29

## 2024-03-13 RX ADMIN — Medication 1 DROP(S): at 05:53

## 2024-03-13 RX ADMIN — GABAPENTIN 300 MILLIGRAM(S): 400 CAPSULE ORAL at 17:28

## 2024-03-13 RX ADMIN — Medication 250 MILLIGRAM(S): at 21:31

## 2024-03-13 RX ADMIN — RALOXIFENE HYDROCHLORIDE 60 MILLIGRAM(S): 60 TABLET, COATED ORAL at 11:51

## 2024-03-13 RX ADMIN — LEVETIRACETAM 400 MILLIGRAM(S): 250 TABLET, FILM COATED ORAL at 05:43

## 2024-03-13 RX ADMIN — Medication 1 APPLICATION(S): at 17:43

## 2024-03-13 RX ADMIN — Medication 20 MILLIGRAM(S): at 17:30

## 2024-03-13 RX ADMIN — Medication 3 MILLILITER(S): at 08:11

## 2024-03-13 RX ADMIN — Medication 3 MILLILITER(S): at 13:13

## 2024-03-13 RX ADMIN — Medication 40 MILLIGRAM(S): at 17:30

## 2024-03-13 RX ADMIN — Medication 2 SPRAY(S): at 13:51

## 2024-03-13 RX ADMIN — Medication 4 MILLILITER(S): at 08:14

## 2024-03-13 RX ADMIN — Medication 4 MILLILITER(S): at 21:01

## 2024-03-13 RX ADMIN — MEROPENEM 100 MILLIGRAM(S): 1 INJECTION INTRAVENOUS at 21:26

## 2024-03-13 RX ADMIN — Medication 250 MILLIGRAM(S): at 17:31

## 2024-03-13 RX ADMIN — SENNA PLUS 2 TABLET(S): 8.6 TABLET ORAL at 21:29

## 2024-03-13 RX ADMIN — ENOXAPARIN SODIUM 50 MILLIGRAM(S): 100 INJECTION SUBCUTANEOUS at 17:29

## 2024-03-13 RX ADMIN — Medication 20 MILLIGRAM(S): at 07:48

## 2024-03-13 RX ADMIN — Medication 650 MILLIGRAM(S): at 14:04

## 2024-03-13 RX ADMIN — MIDODRINE HYDROCHLORIDE 20 MILLIGRAM(S): 2.5 TABLET ORAL at 17:28

## 2024-03-13 RX ADMIN — Medication 1 DROP(S): at 17:28

## 2024-03-13 RX ADMIN — Medication 2 SPRAY(S): at 05:52

## 2024-03-13 RX ADMIN — LEVETIRACETAM 400 MILLIGRAM(S): 250 TABLET, FILM COATED ORAL at 17:31

## 2024-03-13 RX ADMIN — Medication 1 APPLICATION(S): at 11:50

## 2024-03-13 RX ADMIN — ENOXAPARIN SODIUM 50 MILLIGRAM(S): 100 INJECTION SUBCUTANEOUS at 06:16

## 2024-03-13 RX ADMIN — Medication 2 SPRAY(S): at 21:29

## 2024-03-13 RX ADMIN — Medication 650 MILLIGRAM(S): at 13:51

## 2024-03-13 NOTE — PROGRESS NOTE ADULT - SUBJECTIVE AND OBJECTIVE BOX
HPI: 58F with Down syndrome, nonverbal at baseline, hypothyroidism, CP, and seizure disorder here from [facility] with fever and respiratory distress. Found to be septic on admission, from CAP/aspiration PNA, on IV vanco and meropenem, with course c/b continued fevers, and bacteremia with S. hominis. Also failed FEES, has NGT in place and will likely need PEG. Is requiring HFNC alternating with BiPAP. Is also on midodrine for hypotension. Patient is full code. Of note patient is under [place]. Palliative care consulted for [facility].    INTERVAL EVENTS  2/29: patient appears comfortable overall, group home staff at bedside  3/1: patient appears more comfortable today, no objective signs of pain or discomfort  3/4: patient appears comfortable  3/13: patient with increasing O2 requirement today, restarted on IV abx    ADVANCE DIRECTIVES:    [ ] Full Code [X ] DNR  MOLST  [ ]  Living Will  [ ]   DECISION MAKER(s):  [ ] Health Care Proxy(s)  [ ] Surrogate(s)  [ ] Guardian           Name(s): Phone Number(s): [facility]    BASELINE (I)ADL(s) (prior to admission):  [place]: [ ]Total  [ ] Moderate [ ]Dependent  Palliative Performance Status Version 2:         %    http://npcrc.org/files/news/palliative_performance_scale_ppsv2.pdf    Allergies    No Known Allergies    Intolerances    MEDICATIONS  (STANDING):  acetylcysteine 20%  Inhalation 4 milliLiter(s) Inhalation every 6 hours  albuterol/ipratropium for Nebulization 3 milliLiter(s) Nebulizer every 6 hours  AQUAPHOR (petrolatum Ointment) 1 Application(s) Topical two times a day  artificial  tears Solution 1 Drop(s) Both EYES two times a day  calcium carbonate   1250 mG (OsCal) 1 Tablet(s) Oral two times a day  chlorhexidine 2% Cloths 1 Application(s) Topical daily  chlorhexidine 2% Cloths 1 Application(s) Topical daily  enoxaparin Injectable 50 milliGRAM(s) SubCutaneous every 12 hours  erythromycin   Ointment 1 Application(s) Both EYES daily  gabapentin 300 milliGRAM(s) Oral every 12 hours  levETIRAcetam  IVPB 500 milliGRAM(s) IV Intermittent two times a day  melatonin 3 milliGRAM(s) Oral at bedtime  meropenem  IVPB 1000 milliGRAM(s) IV Intermittent every 8 hours  midodrine. 20 milliGRAM(s) Oral three times a day  pantoprazole  Injectable 40 milliGRAM(s) IV Push every 24 hours  polyethylene glycol 3350 17 Gram(s) Oral at bedtime  raloxifene 60 milliGRAM(s) Oral daily  saccharomyces boulardii 250 milliGRAM(s) Oral two times a day  senna 2 Tablet(s) Oral at bedtime  sodium chloride 0.65% Nasal 2 Spray(s) Both Nostrils three times a day  sodium chloride 0.9% Bolus 500 milliLiter(s) IV Bolus once  vancomycin  IVPB 750 milliGRAM(s) IV Intermittent every 12 hours    MEDICATIONS  (PRN):  acetaminophen     Tablet .. 650 milliGRAM(s) Oral every 8 hours PRN Temp greater or equal to 38.5C (101.3F)  acetaminophen   IVPB .. 1000 milliGRAM(s) IV Intermittent once PRN Temp greater or equal to 38C (100.4F)  aluminum hydroxide/magnesium hydroxide/simethicone Suspension 30 milliLiter(s) Oral every 4 hours PRN Dyspepsia  ondansetron Injectable 4 milliGRAM(s) IV Push every 8 hours PRN Nausea and/or Vomiting      PRESENT SYMPTOMS: [X ]Unable to obtain due to poor mentation   Source if other than patient:  [ ]Family   [ ]Team     Pain: [ ]yes [ ]no  QOL impact -   Location -                    Aggravating factors -  Quality -  Radiation -  Timing-  Severity (0-10 scale):  Minimal acceptable level (0-10 scale):     CPOT:    https://www.sccm.org/getattachment/xoh53b94-7u2h-1y4d-8g1v-7986w8840x8z/Critical-Care-Pain-Observation-Tool-(CPOT)    PAIN AD Score: 0  http://geriatrictoolkit.[employer].edu/cog/painad.pdf (press ctrl +  left click to view)    Dyspnea:                           [ ]None[ ]Mild [ ]Moderate [ ]Severe     Respiratory Distress Observation Scale (RDOS): 1  A score of 0 to 2 signifies little or no respiratory distress, 3 signifies mild distress, scores 4 to 6 indicate moderate distress, and scores greater than 7 signify severe distress  https://[email or web address]/sites/default/files/PDFS/787377-kgryeoelokg-nnyhttkk-lxinsyzqfjm-paljh.pdf    Anxiety:                             [ ]None[ ]Mild [ ]Moderate [ ]Severe   Fatigue:                             [ ]None[ ]Mild [ ]Moderate [ ]Severe   Nausea:                             [ ]None[ ]Mild [ ]Moderate [ ]Severe   Loss of appetite:              [ ]None[ ]Mild [ ]Moderate [ ]Severe   Constipation:                    [ ]None[ ]Mild [ ]Moderate [ ]Severe    Other Symptoms:  [ ]All other review of systems negative     Palliative Performance Status Version 2:         %    http://npcrc.org/files/news/palliative_performance_scale_ppsv2.pdf  PHYSICAL EXAM:  Vital Signs Last 24 Hrs  T(C): 38.3 (13 Mar 2024 12:44), Max: 38.3 (13 Mar 2024 12:44)  T(F): 100.9 (13 Mar 2024 12:44), Max: 100.9 (13 Mar 2024 12:44)  HR: 145 (13 Mar 2024 12:44) (96 - 145)  BP: 133/72 (13 Mar 2024 12:44) (114/57 - 133/72)  BP(mean): --  RR: 18 (13 Mar 2024 12:44) (18 - 18)  SpO2: 92% (13 Mar 2024 10:13) (87% - 94%)    Parameters below as of 13 Mar 2024 10:13  Patient On (Oxygen Delivery Method): nasal cannula, high flow          GENERAL:  [X ] No acute distress [ ]Lethargic  [ ]Unarousable  [ ]Verbal  [ ]Non-Verbal [ ]Cachexia    BEHAVIORAL/PSYCH:  [ ]Alert and Oriented x  [ ] Anxiety [ ] Delirium [ ] Agitation [X ] Calm   EYES: [ X] No scleral icterus [ ] Scleral icterus [ ] Closed  ENMT:  [ ]Dry mouth  [ ]No external oral lesions [ X] No external ear or nose lesions  CARDIOVASCULAR:  [ ]Regular [ ]Irregular [ ]Tachy [ ]Not Tachy  [ ]Raheem [ ] Edema [ ] No edema  PULMONARY:  [ ]Tachypnea  [ ]Audible excessive secretions [X ] No labored breathing [ ] labored breathing  GASTROINTESTINAL: [ ]Soft  [ ]Distended  [ X]Not distended [ ]Non tender [ ]Tender  MUSCULOSKELETAL: [ ]No clubbing [ ] clubbing  [ X] No cyanosis [ ] cyanosis  NEUROLOGIC: [ ]No focal deficits  [ ]Follows commands  [ ]Does not follow commands  [X ]Cognitive impairment  [ ]Dysphagia  [ ]Dysarthria  [ ]Paresis   SKIN: [ ] Jaundiced [X ] Non-jaundiced [ ]Rash [ ]No Rash [ ] Warm [ ] Dry  MISC/LINES: [ ] ET tube [ ] Trach [ ]NGT/OGT [ ]PEG [ ]Madsen    CRITICAL CARE:  [ ] Shock Present  [ ]Septic [ ]Cardiogenic [ ]Neurologic [ ]Hypovolemic  [ ]  Vasopressors [ ]  Inotropes   [ ]Respiratory failure present [ ]Mechanical ventilation [ ]Non-invasive ventilatory support [ ]High flow  [ ]Acute  [ ]Chronic [ ]Hypoxic  [ ]Hypercarbic [ ]Other  [ ]Other organ failure     LABS: reviewed by me                          8.7    13.97 )-----------( 622      ( 13 Mar 2024 08:35 )             28.6     03-13    138  |  97<L>  |  16  ----------------------------<  162<H>  4.4   |  30  |  0.6<L>    Ca    9.5      13 Mar 2024 08:35  Mg     2.5     03-13        RADIOLOGY & ADDITIONAL STUDIES: reviewed by m    CXR 2/5/24    Support devices: Enteric tube satisfactory position.    Cardiac/ Mediastinum: unremarkable    Lungs/ Pleura: Diminishing left lung opacity/effusion. Stable smaller   right basilar opacity. No pneumothorax.    Skeletal/ soft tissues: Stable    PROTEIN CALORIE MALNUTRITION PRESENT: [ ]mild [ ]moderate [ ]severe [ ]underweight [ ]morbid obesity  https://www.andeal.org/vault/2440/web/files/ONC/Table_Clinical%20Characteristics%20to%20Document%20Malnutrition-White%20JV%20et%20al%202012.pdf    Height (cm): 136.4 (01-24-24 @ 09:52), 154.9 (11-17-23 @ 15:00), 145 (10-02-23 @ 12:00)  Weight (kg): 52.3 (01-21-24 @ 08:00), 60 (11-17-23 @ 15:00), 55.3 (10-02-23 @ 12:00)  BMI (kg/m2): 28.1 (01-24-24 @ 09:52), 21.8 (01-21-24 @ 08:00), 25 (11-17-23 @ 15:00)    [ ]PPSV2 < or = to 30% [ ]significant weight loss  [ ]poor nutritional intake  [ ]anasarca      [ ]Artificial Nutrition      Palliative Care Spiritual/Emotional Screening Tool Question  Severity (0-4):                    OR                    [X ] Unable to determine/NA  Score of 2 or greater indicates recommendation of Chaplaincy referral  Chaplaincy Referral: [ ] Yes [ ] Refused [ ] Following     Caregiver [place]:  [ ] Yes [ ] No    OR    [x ] Unable to determine. Will assess at later time if appropriate.  Social Work Referral [ ]  Patient and Family Centered Care Referral [ ]    Anticipatory Grief Present: [ ] Yes [ ] No    OR     [ x] Unable to determine. Will assess at later time if appropriate.  Social Work Referral [ ]  Patient and Family Centered Care Referral [ ]    REFERRALS:   [ ]Chaplaincy  [ ]Hospice  [ ]Child Life  [ ]Social Work  [ ]Case management [ ]Holistic Therapy     Palliative care education provided to patient and/or family    Goals of Care Document:     ______________  Wu Jenkins MD  Palliative Medicine  [facility]   of Geriatric and Palliative Medicine  [phone]

## 2024-03-13 NOTE — CHART NOTE - NSCHARTNOTEFT_GEN_A_CORE
Received report from nurse that patient destated to 87 while on 5L NC. Patient was seen and examined at bedside. Patient also placed on NRB with saturation improving to 95-98%. Upon lung auscultation, crackles were appreciated b/l. Patient was found awake and not in any acute distress. Vitals also significant for tachycardia (HR of 117) and a temperature that is creeping up (100.2 F).    Intervention:  - CXR   - ABG  - ECG  - Dimer  - Blood culture  - CMP  - CBC  - Lactate  - Trops    Follow up:  - Labs  - CXR  - ECG    Update @7:04  - CXR notable for vascular congestion -> Lasix 20 ordered

## 2024-03-13 NOTE — PROGRESS NOTE ADULT - SUBJECTIVE AND OBJECTIVE BOX
TEA ECHAVARRIA  58y  [facility] 4A 025 A      Patient is a 58y old  Female who presents with a chief complaint of Respiratory Distress (12 Mar 2024 16:58)      INTERVAL HPI/OVERNIGHT EVENTS:        REVIEW OF SYSTEMS:        FAMILY HISTORY:    T(C): 37.8 (03-13-24 @ 06:00), Max: 37.9 (03-12-24 @ 21:36)  HR: 114 (03-13-24 @ 06:00) (82 - 114)  BP: 116/64 (03-13-24 @ 06:00) (105/51 - 152/63)  RR: 18 (03-13-24 @ 06:15) (18 - 18)  SpO2: 94% (03-13-24 @ 06:15) (87% - 94%)  Wt(kg): --Vital Signs Last 24 Hrs  T(C): 37.8 (13 Mar 2024 06:00), Max: 37.9 (12 Mar 2024 21:36)  T(F): 100 (13 Mar 2024 06:00), Max: 100.2 (12 Mar 2024 21:36)  HR: 114 (13 Mar 2024 06:00) (82 - 114)  BP: 116/64 (13 Mar 2024 06:00) (105/51 - 152/63)  BP(mean): --  RR: 18 (13 Mar 2024 06:15) (18 - 18)  SpO2: 94% (13 Mar 2024 06:15) (87% - 94%)    Parameters below as of 13 Mar 2024 06:00  Patient On (Oxygen Delivery Method): nasal cannula        PHYSICAL EXAM:  GENERAL: NAD, well-groomed, well-developed  HEAD:  Atraumatic, Normocephalic  EYES: EOMI, PERRLA, conjunctiva and sclera clear  ENMT: No tonsillar erythema, exudates, or enlargement; Moist mucous membranes, Good dentition, No lesions  NECK: Supple, No JVD, Normal thyroid  NERVOUS SYSTEM:  Alert & Oriented X3, Good concentration; Motor Strength 5/5 B/L upper and lower extremities; DTRs 2+ intact and symmetric  PULM: Clear to auscultation bilaterally  CARDIAC: Regular rate and rhythm; No murmurs, rubs, or gallops  GI: Soft, Nontender, Nondistended; Bowel sounds present  EXTREMITIES:  2+ Peripheral Pulses, No clubbing, cyanosis, or edema  LYMPH: No lymphadenopathy noted  SKIN: No rashes or lesions    Consultant(s) Notes Reviewed:  [x ] YES  [ ] NO  Care Discussed with Consultants/Other Providers [ x] YES  [ ] NO    LABS:                            8.7    13.97 )-----------( 622      ( 13 Mar 2024 08:35 )             28.6   03-13    138  |  97<L>  |  16  ----------------------------<  162<H>  4.4   |  30  |  0.6<L>    Ca    9.5      13 Mar 2024 08:35  Mg     2.5     03-13    TPro  7.6  /  Alb  3.4<L>  /  TBili  0.3  /  DBili  x   /  AST  17  /  ALT  13  /  AlkPhos  108  03-13            acetaminophen     Tablet .. 650 milliGRAM(s) Oral every 8 hours PRN  acetaminophen   IVPB .. 1000 milliGRAM(s) IV Intermittent once PRN  acetylcysteine 20%  Inhalation 4 milliLiter(s) Inhalation every 6 hours  albuterol/ipratropium for Nebulization 3 milliLiter(s) Nebulizer every 6 hours  aluminum hydroxide/magnesium hydroxide/simethicone Suspension 30 milliLiter(s) Oral every 4 hours PRN  AQUAPHOR (petrolatum Ointment) 1 Application(s) Topical two times a day  artificial  tears Solution 1 Drop(s) Both EYES two times a day  calcium carbonate   1250 mG (OsCal) 1 Tablet(s) Oral two times a day  chlorhexidine 2% Cloths 1 Application(s) Topical daily  chlorhexidine 2% Cloths 1 Application(s) Topical daily  enoxaparin Injectable 50 milliGRAM(s) SubCutaneous every 12 hours  erythromycin   Ointment 1 Application(s) Both EYES daily  gabapentin 300 milliGRAM(s) Oral every 12 hours  levETIRAcetam  IVPB 500 milliGRAM(s) IV Intermittent two times a day  melatonin 3 milliGRAM(s) Oral at bedtime  midodrine. 20 milliGRAM(s) Oral three times a day  ondansetron Injectable 4 milliGRAM(s) IV Push every 8 hours PRN  pantoprazole  Injectable 40 milliGRAM(s) IV Push every 24 hours  polyethylene glycol 3350 17 Gram(s) Oral at bedtime  raloxifene 60 milliGRAM(s) Oral daily  saccharomyces boulardii 250 milliGRAM(s) Oral two times a day  senna 2 Tablet(s) Oral at bedtime  sodium chloride 0.65% Nasal 2 Spray(s) Both Nostrils three times a day  sodium chloride 0.9% Bolus 500 milliLiter(s) IV Bolus once        58F w/ Down Syndrome/Cerebral Palsy/Intellectual Disability(Non-verbal at baseline), Seizure Disorder, DVT admitted for Sepsis w/ Acute Respiratory Failure w/ Hypoxia 2/2 Pneumonia w/ Abscess & RSV.      1. Acute hypoxic respiratory failure , on increasing O2 requirement secondary to pneumonia and RSV and Abscess persistent   - completed intravenous danna and caspofungin therapy duration as scheduled   - Advanced Directive: approved DNI/DNR and Comfort Measures Option if no substantial improvement after abx completed on 3/10/24  - Patient remains with poor prognosis overall despite all care and long term survival not expected   - Received a dose of lasix 20mg IV *1   - now on high flow     2. Known with DVT  - Now on lovenox     3. Down's Sd and seizure disorder on AED   - Cont home meds      4. Profound intellectual disability due to cerebral palsy   - Supportive care         TEA ECHAVARRIA  58y  [facility]-N 4A 025 A      Patient is a 58y old  Female who presents with a chief complaint of Respiratory Distress (12 Mar 2024 16:58)      INTERVAL HPI/OVERNIGHT EVENTS:      pt is ill looking  not interactive. in distress on non-rebreather         FAMILY HISTORY:    T(C): 37.8 (03-13-24 @ 06:00), Max: 37.9 (03-12-24 @ 21:36)  HR: 114 (03-13-24 @ 06:00) (82 - 114)  BP: 116/64 (03-13-24 @ 06:00) (105/51 - 152/63)  RR: 18 (03-13-24 @ 06:15) (18 - 18)  SpO2: 94% (03-13-24 @ 06:15) (87% - 94%)  Wt(kg): --Vital Signs Last 24 Hrs  T(C): 37.8 (13 Mar 2024 06:00), Max: 37.9 (12 Mar 2024 21:36)  T(F): 100 (13 Mar 2024 06:00), Max: 100.2 (12 Mar 2024 21:36)  HR: 114 (13 Mar 2024 06:00) (82 - 114)  BP: 116/64 (13 Mar 2024 06:00) (105/51 - 152/63)  BP(mean): --  RR: 18 (13 Mar 2024 06:15) (18 - 18)  SpO2: 94% (13 Mar 2024 06:15) (87% - 94%)    Parameters below as of 13 Mar 2024 06:00  Patient On (Oxygen Delivery Method): nasal cannula        PHYSICAL EXAM:  GENERAL: non verbal in distresss   PULM: wheezing noted   CARDIAC: Regular rate and rhythm; No murmurs, rubs, or gallops  GI: Soft, Nontender, Nondistended; Bowel sounds present  EXTREMITIES:  2+ Peripheral Pulses,     Consultant(s) Notes Reviewed:  [x ] YES  [ ] NO  Care Discussed with Consultants/Other Providers [ x] YES  [ ] NO    LABS:                            8.7    13.97 )-----------( 622      ( 13 Mar 2024 08:35 )             28.6   03-13    138  |  97<L>  |  16  ----------------------------<  162<H>  4.4   |  30  |  0.6<L>    Ca    9.5      13 Mar 2024 08:35  Mg     2.5     03-13    TPro  7.6  /  Alb  3.4<L>  /  TBili  0.3  /  DBili  x   /  AST  17  /  ALT  13  /  AlkPhos  108  03-13            acetaminophen     Tablet .. 650 milliGRAM(s) Oral every 8 hours PRN  acetaminophen   IVPB .. 1000 milliGRAM(s) IV Intermittent once PRN  acetylcysteine 20%  Inhalation 4 milliLiter(s) Inhalation every 6 hours  albuterol/ipratropium for Nebulization 3 milliLiter(s) Nebulizer every 6 hours  aluminum hydroxide/magnesium hydroxide/simethicone Suspension 30 milliLiter(s) Oral every 4 hours PRN  AQUAPHOR (petrolatum Ointment) 1 Application(s) Topical two times a day  artificial  tears Solution 1 Drop(s) Both EYES two times a day  calcium carbonate   1250 mG (OsCal) 1 Tablet(s) Oral two times a day  chlorhexidine 2% Cloths 1 Application(s) Topical daily  chlorhexidine 2% Cloths 1 Application(s) Topical daily  enoxaparin Injectable 50 milliGRAM(s) SubCutaneous every 12 hours  erythromycin   Ointment 1 Application(s) Both EYES daily  gabapentin 300 milliGRAM(s) Oral every 12 hours  levETIRAcetam  IVPB 500 milliGRAM(s) IV Intermittent two times a day  melatonin 3 milliGRAM(s) Oral at bedtime  midodrine. 20 milliGRAM(s) Oral three times a day  ondansetron Injectable 4 milliGRAM(s) IV Push every 8 hours PRN  pantoprazole  Injectable 40 milliGRAM(s) IV Push every 24 hours  polyethylene glycol 3350 17 Gram(s) Oral at bedtime  raloxifene 60 milliGRAM(s) Oral daily  saccharomyces boulardii 250 milliGRAM(s) Oral two times a day  senna 2 Tablet(s) Oral at bedtime  sodium chloride 0.65% Nasal 2 Spray(s) Both Nostrils three times a day  sodium chloride 0.9% Bolus 500 milliLiter(s) IV Bolus once        58F w/ Down Syndrome/Cerebral Palsy/Intellectual Disability(Non-verbal at baseline), Seizure Disorder, DVT admitted for Sepsis w/ Acute Respiratory Failure w/ Hypoxia 2/2 Pneumonia w/ Abscess & RSV.      1. Acute hypoxic respiratory failure , on increasing O2 requirement secondary to pneumonia and RSV and Abscess persistent   - completed intravenous danna and caspofungin therapy duration as scheduled   - Re-started meropenem and vancomycin   - MRSA:pending  - Start lasix 20mg IV bid   - Start Solumedrol 40mg IV daily   - Advanced Directive: approved DNI/DNR and Comfort Measures Option if no substantial improvement after abx completed on 3/10/24  - Patient remains with poor prognosis overall despite all care and long term survival not expected   - Received a dose of lasix 20mg IV *1   - now on high flow   - CT chest :pending   - ID consult:  a) BCX/midline 2/26  b) Repeat CT Chest w/   c) Send MRSA nares, if negative D/C VANC - Vanc dosing AUC/ZANE per clinical pharmacist   d) Send fungitell   e) Grave prognosis, aggressive care is futile  - Palliative care consult:pending     2. Known with DVT  - Now on lovenox     3. Down's Sd and seizure disorder on AED   - Cont home meds      4. Profound intellectual disability due to cerebral palsy   - Supportive care

## 2024-03-13 NOTE — PROGRESS NOTE ADULT - SUBJECTIVE AND OBJECTIVE BOX
TEA ECHAVARRIA  58y, Female  Allergy: No Known Allergies      LOS  53d    CHIEF COMPLAINT: Respiratory Distress (13 Mar 2024 09:49)      INTERVAL EVENTS/HPI  - Hypoxemic, put on HFNC, WBC 13 from 7 , Afebrile   - meropenem , caspo ended 3/10 , completed about 6 weeks of therapy   - T(F): , Max: 100.2 (03-12-24 @ 21:36)  - Tolerating medication  - WBC Count: 13.97 (03-13-24 @ 08:35)  WBC Count: 13.99 (03-13-24 @ 07:08)     - Creatinine: 0.6 (03-13-24 @ 08:35)  Creatinine: 0.5 (03-13-24 @ 07:08)       ROS  unable to obtain history secondary to patient's mental status and/or sedation     VITALS:  T(F): 100, Max: 100.2 (03-12-24 @ 21:36)  HR: 114  BP: 116/70  RR: 18Vital Signs Last 24 Hrs  T(C): 37.8 (13 Mar 2024 06:00), Max: 37.9 (12 Mar 2024 21:36)  T(F): 100 (13 Mar 2024 06:00), Max: 100.2 (12 Mar 2024 21:36)  HR: 114 (13 Mar 2024 06:00) (82 - 114)  BP: 116/70 (13 Mar 2024 10:13) (105/51 - 152/63)  BP(mean): --  RR: 18 (13 Mar 2024 06:15) (18 - 18)  SpO2: 92% (13 Mar 2024 10:13) (87% - 94%)    Parameters below as of 13 Mar 2024 10:13  Patient On (Oxygen Delivery Method): nasal cannula, high flow        PHYSICAL EXAM:  ***    FH: Non-contributory  Social Hx: Non-contributory    TESTS & MEASUREMENTS:                        8.7    13.97 )-----------( 622      ( 13 Mar 2024 08:35 )             28.6     03-13    138  |  97<L>  |  16  ----------------------------<  162<H>  4.4   |  30  |  0.6<L>    Ca    9.5      13 Mar 2024 08:35  Mg     2.5     03-13    TPro  7.6  /  Alb  3.4<L>  /  TBili  0.3  /  DBili  x   /  AST  17  /  ALT  13  /  AlkPhos  108  03-13      LIVER FUNCTIONS - ( 13 Mar 2024 07:08 )  Alb: 3.4 g/dL / Pro: 7.6 g/dL / ALK PHOS: 108 U/L / ALT: 13 U/L / AST: 17 U/L / GGT: x           Urinalysis Basic - ( 13 Mar 2024 08:35 )    Color: x / Appearance: x / SG: x / pH: x  Gluc: 162 mg/dL / Ketone: x  / Bili: x / Urobili: x   Blood: x / Protein: x / Nitrite: x   Leuk Esterase: x / RBC: x / WBC x   Sq Epi: x / Non Sq Epi: x / Bacteria: x        Culture - Blood (collected 03-08-24 @ 08:30)  Source: .Blood None  Preliminary Report (03-12-24 @ 14:00):    No growth at 4 days    Culture - Blood (collected 03-07-24 @ 22:12)  Source: .Blood Blood-Peripheral  Final Report (03-13-24 @ 07:00):    No growth at 5 days    Culture - Blood (collected 02-25-24 @ 11:47)  Source: .Blood None  Final Report (03-01-24 @ 20:00):    No growth at 5 days    Culture - Blood (collected 02-19-24 @ 11:23)  Source: .Blood None  Final Report (02-24-24 @ 20:01):    No growth at 5 days    Culture - Blood (collected 02-17-24 @ 20:41)  Source: .Blood None  Final Report (02-23-24 @ 06:00):    No growth at 5 days        Lactate, Blood: 1.7 mmol/L (03-13-24 @ 07:08)      INFECTIOUS DISEASES TESTING  Fungitell: 73 (02-23-24 @ 18:19)  MRSA PCR Result.: Negative (02-20-24 @ 13:42)  Fungitell: 76 (02-19-24 @ 16:00)  Procalcitonin, Serum: 0.16 (02-19-24 @ 16:00)  Procalcitonin, Serum: 0.20 (02-19-24 @ 11:23)  Rapid RVP Result: NotDetec (02-17-24 @ 19:06)  Procalcitonin, Serum: 0.11 (02-17-24 @ 10:41)  MRSA PCR Result.: Negative (02-15-24 @ 06:20)  Procalcitonin, Serum: 0.10 (02-12-24 @ 11:17)  Rapid RVP Result: NotDetec (02-12-24 @ 10:28)  MRSA PCR Result.: Negative (02-12-24 @ 10:28)  Fungitell: 63 (02-06-24 @ 11:27)  Fungitell: 65 (02-06-24 @ 04:43)  Rapid RVP Result: NotDetec (02-04-24 @ 10:28)  MRSA PCR Result.: Negative (02-03-24 @ 21:32)  Procalcitonin, Serum: 0.15 (02-03-24 @ 11:13)  Procalcitonin, Serum: 0.22 (02-01-24 @ 16:00)  MRSA PCR Result.: Negative (01-22-24 @ 05:30)  Streptococcus pneumoniae Ag, Ur Result: Negative (01-21-24 @ 05:00)  Legionella Antigen, Urine: Negative (01-21-24 @ 05:00)  Rapid RVP Result: Detected (01-21-24 @ 00:58)  Procalcitonin, Serum: 0.51 (01-20-24 @ 21:23)  Fungitell: 325 (01-20-24 @ 21:23)  Vancomycin Level, Trough: 5.9 (11-12-23 @ 17:55)  Fungitell: 138 (11-07-23 @ 08:08)  Fungitell: 115 (11-02-23 @ 11:40)  Procalcitonin, Serum: 0.04 (11-02-23 @ 11:40)  Procalcitonin, Serum: 0.26 (10-24-23 @ 11:00)  MRSA PCR Result.: Negative (10-17-23 @ 17:20)  Fungitell: >500 (10-17-23 @ 17:20)  Procalcitonin, Serum: 4.36 (10-17-23 @ 17:20)  Procalcitonin, Serum: 4.18 (10-17-23 @ 12:35)  Procalcitonin, Serum: 0.07 (10-15-23 @ 07:28)  COVID-19 PCR: NotDetec (10-12-23 @ 09:14)  Procalcitonin, Serum: 0.40 (10-07-23 @ 10:54)  Streptococcus pneumoniae Ag, Ur Result: Negative (09-30-23 @ 14:36)  Legionella Antigen, Urine: Negative (09-30-23 @ 14:36)  MRSA PCR Result.: Negative (09-29-23 @ 16:50)  Procalcitonin, Serum: 9.78 (08-12-23 @ 11:25)  MRSA PCR Result.: Negative (08-12-23 @ 06:10)  Rapid RVP Result: NotDetec (08-12-23 @ 01:33)  Procalcitonin, Serum: 0.63 (08-10-23 @ 08:46)  Procalcitonin, Serum: 7.82 (08-04-23 @ 16:13)  MRSA PCR Result.: Negative (08-04-23 @ 14:52)  Rapid RVP Result: NotDetec (08-04-23 @ 03:00)  strept    INFLAMMATORY MARKERS  Sedimentation Rate, Erythrocyte: 100 mm/Hr (02-03-24 @ 11:13)  C-Reactive Protein, Serum: 214.2 mg/L (02-03-24 @ 11:13)  C-Reactive Protein, Serum: 132.3 mg/L (02-01-24 @ 16:00)  Sedimentation Rate, Erythrocyte: 67 mm/Hr (10-15-23 @ 07:28)      RADIOLOGY & ADDITIONAL TESTS:  I have personally reviewed the last available Chest xray  CXR  Xray Chest 1 View- PORTABLE-Urgent:   ACC: [id number] EXAM:  XR CHEST PORTABLE URGENT 1V   ORDERED BY: CRISTOPHER LOPEZ     PROCEDURE DATE:  03/13/2024          INTERPRETATION:  Clinical History / Reason for exam: Desaturation    Comparison : 3/7/2024.    Technique/Positioning: Frontal view.    Findings:    Support devices: Feeding tube is with its tip in the region of the   stomach.    Cardiac/mediastinum/hilum: Unremarkable.    Lung parenchyma/Pleura: Bilateral interstitial densities without effusion   or pneumothorax..    Skeleton/soft tissues: No significant osseous abnormality is seen.      Impression:      Bilateral interstitial densities may reflect vascular congestion.    --- End of Report ---            VARUN GAGE MD; Attending Radiologist  This document has been electronically signed. Mar 13 2024  6:59AM (03-13-24 @ 06:52)      CT      CARDIOLOGY TESTING      MEDICATIONS  acetylcysteine 20%  Inhalation 4 Inhalation every 6 hours  albuterol/ipratropium for Nebulization 3 Nebulizer every 6 hours  AQUAPHOR (petrolatum Ointment) 1 Topical two times a day  artificial  tears Solution 1 Both EYES two times a day  calcium carbonate   1250 mG (OsCal) 1 Oral two times a day  chlorhexidine 2% Cloths 1 Topical daily  chlorhexidine 2% Cloths 1 Topical daily  enoxaparin Injectable 50 SubCutaneous every 12 hours  erythromycin   Ointment 1 Both EYES daily  gabapentin 300 Oral every 12 hours  levETIRAcetam  IVPB 500 IV Intermittent two times a day  melatonin 3 Oral at bedtime  meropenem  IVPB 1000 IV Intermittent once  midodrine. 20 Oral three times a day  pantoprazole  Injectable 40 IV Push every 24 hours  polyethylene glycol 3350 17 Oral at bedtime  raloxifene 60 Oral daily  saccharomyces boulardii 250 Oral two times a day  senna 2 Oral at bedtime  sodium chloride 0.65% Nasal 2 Both Nostrils three times a day  sodium chloride 0.9% Bolus 500 IV Bolus once  vancomycin  IVPB 750 IV Intermittent every 12 hours      WEIGHT  Weight (kg): 52.3 (01-21-24 @ 08:00)  Creatinine: 0.6 mg/dL (03-13-24 @ 08:35)  Creatinine: 0.5 mg/dL (03-13-24 @ 07:08)      ANTIBIOTICS:  meropenem  IVPB 1000 milliGRAM(s) IV Intermittent once  vancomycin  IVPB 750 milliGRAM(s) IV Intermittent every 12 hours      All available historical records have been reviewed       JERICHO TEA  58y, Female  Allergy: No Known Allergies      LOS  53d    CHIEF COMPLAINT: Respiratory Distress (13 Mar 2024 09:49)      INTERVAL EVENTS/HPI  - Hypoxemic, put on HFNC, WBC 13 from 7 , Afebrile   - meropenem , caspo ended 3/10 , completed about 6 weeks of therapy   - T(F): , Max: 100.2 (03-12-24 @ 21:36)  - Tolerating medication  - WBC Count: 13.97 (03-13-24 @ 08:35)  WBC Count: 13.99 (03-13-24 @ 07:08)     - Creatinine: 0.6 (03-13-24 @ 08:35)  Creatinine: 0.5 (03-13-24 @ 07:08)       ROS  unable to obtain history secondary to patient's mental status and/or sedation     VITALS:  T(F): 100, Max: 100.2 (03-12-24 @ 21:36)  HR: 114  BP: 116/70  RR: 18Vital Signs Last 24 Hrs  T(C): 37.8 (13 Mar 2024 06:00), Max: 37.9 (12 Mar 2024 21:36)  T(F): 100 (13 Mar 2024 06:00), Max: 100.2 (12 Mar 2024 21:36)  HR: 114 (13 Mar 2024 06:00) (82 - 114)  BP: 116/70 (13 Mar 2024 10:13) (105/51 - 152/63)  BP(mean): --  RR: 18 (13 Mar 2024 06:15) (18 - 18)  SpO2: 92% (13 Mar 2024 10:13) (87% - 94%)    Parameters below as of 13 Mar 2024 10:13  Patient On (Oxygen Delivery Method): nasal cannula, high flow        PHYSICAL EXAM:  Gen: chronically ill appearing  face mask NGT  HEENT: Normocephalic, atraumatic  Neck: supple, no lymphadenopathy  CV: Regular rate & regular rhythm  Lungs: decreased BS at bases, no fremitus  Abdomen: Soft, BS present  Ext: Warm, well perfused  Neuro: non focal, not following commands  Skin: no rash, no erythema  Lines: no phlebitis     FH: Non-contributory  Social Hx: Non-contributory    TESTS & MEASUREMENTS:                        8.7    13.97 )-----------( 622      ( 13 Mar 2024 08:35 )             28.6     03-13    138  |  97<L>  |  16  ----------------------------<  162<H>  4.4   |  30  |  0.6<L>    Ca    9.5      13 Mar 2024 08:35  Mg     2.5     03-13    TPro  7.6  /  Alb  3.4<L>  /  TBili  0.3  /  DBili  x   /  AST  17  /  ALT  13  /  AlkPhos  108  03-13      LIVER FUNCTIONS - ( 13 Mar 2024 07:08 )  Alb: 3.4 g/dL / Pro: 7.6 g/dL / ALK PHOS: 108 U/L / ALT: 13 U/L / AST: 17 U/L / GGT: x           Urinalysis Basic - ( 13 Mar 2024 08:35 )    Color: x / Appearance: x / SG: x / pH: x  Gluc: 162 mg/dL / Ketone: x  / Bili: x / Urobili: x   Blood: x / Protein: x / Nitrite: x   Leuk Esterase: x / RBC: x / WBC x   Sq Epi: x / Non Sq Epi: x / Bacteria: x        Culture - Blood (collected 03-08-24 @ 08:30)  Source: .Blood None  Preliminary Report (03-12-24 @ 14:00):    No growth at 4 days    Culture - Blood (collected 03-07-24 @ 22:12)  Source: .Blood Blood-Peripheral  Final Report (03-13-24 @ 07:00):    No growth at 5 days    Culture - Blood (collected 02-25-24 @ 11:47)  Source: .Blood None  Final Report (03-01-24 @ 20:00):    No growth at 5 days    Culture - Blood (collected 02-19-24 @ 11:23)  Source: .Blood None  Final Report (02-24-24 @ 20:01):    No growth at 5 days    Culture - Blood (collected 02-17-24 @ 20:41)  Source: .Blood None  Final Report (02-23-24 @ 06:00):    No growth at 5 days        Lactate, Blood: 1.7 mmol/L (03-13-24 @ 07:08)      INFECTIOUS DISEASES TESTING  Fungitell: 73 (02-23-24 @ 18:19)  MRSA PCR Result.: Negative (02-20-24 @ 13:42)  Fungitell: 76 (02-19-24 @ 16:00)  Procalcitonin, Serum: 0.16 (02-19-24 @ 16:00)  Procalcitonin, Serum: 0.20 (02-19-24 @ 11:23)  Rapid RVP Result: NotDetec (02-17-24 @ 19:06)  Procalcitonin, Serum: 0.11 (02-17-24 @ 10:41)  MRSA PCR Result.: Negative (02-15-24 @ 06:20)  Procalcitonin, Serum: 0.10 (02-12-24 @ 11:17)  Rapid RVP Result: NotDetec (02-12-24 @ 10:28)  MRSA PCR Result.: Negative (02-12-24 @ 10:28)  Fungitell: 63 (02-06-24 @ 11:27)  Fungitell: 65 (02-06-24 @ 04:43)  Rapid RVP Result: NotDetec (02-04-24 @ 10:28)  MRSA PCR Result.: Negative (02-03-24 @ 21:32)  Procalcitonin, Serum: 0.15 (02-03-24 @ 11:13)  Procalcitonin, Serum: 0.22 (02-01-24 @ 16:00)  MRSA PCR Result.: Negative (01-22-24 @ 05:30)  Streptococcus pneumoniae Ag, Ur Result: Negative (01-21-24 @ 05:00)  Legionella Antigen, Urine: Negative (01-21-24 @ 05:00)  Rapid RVP Result: Detected (01-21-24 @ 00:58)  Procalcitonin, Serum: 0.51 (01-20-24 @ 21:23)  Fungitell: 325 (01-20-24 @ 21:23)  Vancomycin Level, Trough: 5.9 (11-12-23 @ 17:55)  Fungitell: 138 (11-07-23 @ 08:08)  Fungitell: 115 (11-02-23 @ 11:40)  Procalcitonin, Serum: 0.04 (11-02-23 @ 11:40)  Procalcitonin, Serum: 0.26 (10-24-23 @ 11:00)  MRSA PCR Result.: Negative (10-17-23 @ 17:20)  Fungitell: >500 (10-17-23 @ 17:20)  Procalcitonin, Serum: 4.36 (10-17-23 @ 17:20)  Procalcitonin, Serum: 4.18 (10-17-23 @ 12:35)  Procalcitonin, Serum: 0.07 (10-15-23 @ 07:28)  COVID-19 PCR: NotDetec (10-12-23 @ 09:14)  Procalcitonin, Serum: 0.40 (10-07-23 @ 10:54)  Streptococcus pneumoniae Ag, Ur Result: Negative (09-30-23 @ 14:36)  Legionella Antigen, Urine: Negative (09-30-23 @ 14:36)  MRSA PCR Result.: Negative (09-29-23 @ 16:50)  Procalcitonin, Serum: 9.78 (08-12-23 @ 11:25)  MRSA PCR Result.: Negative (08-12-23 @ 06:10)  Rapid RVP Result: NotDetec (08-12-23 @ 01:33)  Procalcitonin, Serum: 0.63 (08-10-23 @ 08:46)  Procalcitonin, Serum: 7.82 (08-04-23 @ 16:13)  MRSA PCR Result.: Negative (08-04-23 @ 14:52)  Rapid RVP Result: NotDetec (08-04-23 @ 03:00)  strept    INFLAMMATORY MARKERS  Sedimentation Rate, Erythrocyte: 100 mm/Hr (02-03-24 @ 11:13)  C-Reactive Protein, Serum: 214.2 mg/L (02-03-24 @ 11:13)  C-Reactive Protein, Serum: 132.3 mg/L (02-01-24 @ 16:00)  Sedimentation Rate, Erythrocyte: 67 mm/Hr (10-15-23 @ 07:28)      RADIOLOGY & ADDITIONAL TESTS:  I have personally reviewed the last available Chest xray  CXR  Xray Chest 1 View- PORTABLE-Urgent:   ACC: [id number] EXAM:  XR CHEST PORTABLE URGENT 1V   ORDERED BY: CRISTOPHER LOPEZ     PROCEDURE DATE:  03/13/2024          INTERPRETATION:  Clinical History / Reason for exam: Desaturation    Comparison : 3/7/2024.    Technique/Positioning: Frontal view.    Findings:    Support devices: Feeding tube is with its tip in the region of the   stomach.    Cardiac/mediastinum/hilum: Unremarkable.    Lung parenchyma/Pleura: Bilateral interstitial densities without effusion   or pneumothorax..    Skeleton/soft tissues: No significant osseous abnormality is seen.      Impression:      Bilateral interstitial densities may reflect vascular congestion.    --- End of Report ---            VARUN GAGE MD; Attending Radiologist  This document has been electronically signed. Mar 13 2024  6:59AM (03-13-24 @ 06:52)      CT      CARDIOLOGY TESTING      MEDICATIONS  acetylcysteine 20%  Inhalation 4 Inhalation every 6 hours  albuterol/ipratropium for Nebulization 3 Nebulizer every 6 hours  AQUAPHOR (petrolatum Ointment) 1 Topical two times a day  artificial  tears Solution 1 Both EYES two times a day  calcium carbonate   1250 mG (OsCal) 1 Oral two times a day  chlorhexidine 2% Cloths 1 Topical daily  chlorhexidine 2% Cloths 1 Topical daily  enoxaparin Injectable 50 SubCutaneous every 12 hours  erythromycin   Ointment 1 Both EYES daily  gabapentin 300 Oral every 12 hours  levETIRAcetam  IVPB 500 IV Intermittent two times a day  melatonin 3 Oral at bedtime  meropenem  IVPB 1000 IV Intermittent once  midodrine. 20 Oral three times a day  pantoprazole  Injectable 40 IV Push every 24 hours  polyethylene glycol 3350 17 Oral at bedtime  raloxifene 60 Oral daily  saccharomyces boulardii 250 Oral two times a day  senna 2 Oral at bedtime  sodium chloride 0.65% Nasal 2 Both Nostrils three times a day  sodium chloride 0.9% Bolus 500 IV Bolus once  vancomycin  IVPB 750 IV Intermittent every 12 hours      WEIGHT  Weight (kg): 52.3 (01-21-24 @ 08:00)  Creatinine: 0.6 mg/dL (03-13-24 @ 08:35)  Creatinine: 0.5 mg/dL (03-13-24 @ 07:08)      ANTIBIOTICS:  meropenem  IVPB 1000 milliGRAM(s) IV Intermittent once  vancomycin  IVPB 750 milliGRAM(s) IV Intermittent every 12 hours      All available historical records have been reviewed

## 2024-03-13 NOTE — PROGRESS NOTE ADULT - ASSESSMENT
58F with Down syndrome, nonverbal at baseline, hypothyroidism, CP, and seizure disorder here from [facility] with fever and respiratory distress. Found to be septic on admission, from CAP/aspiration PNA, on IV vanco and meropenem, with course c/b continued fevers, and bacteremia with S. hominis. Also failed FEES, has NGT in place and will likely need PEG. Is requiring HFNC alternating with BiPAP. Is also on midodrine for hypotension. Patient is full code. Of note patient is under [place] [facility]. Palliative care consulted for [facility].

## 2024-03-13 NOTE — CHART NOTE - NSCHARTNOTEFT_GEN_A_CORE
Received report from nurse that patient's O2stat was 75 and not going up. In addition, that patient's HR is 137 and rectal temp of 103.1. Patient was seen and examined at bedside, found to be stating on low 70s while on 15L NRB. Patient also appeared to be shivering. Mild wheezing appreciated on lung auscultation.     Interventions:  - CXR  - EKG  - Tylenol  - Cooling blanket  - ABG  - HFNC      Follow up:  - CXR  - EKG  - ABG  - Clinical improvement

## 2024-03-13 NOTE — PROGRESS NOTE ADULT - ASSESSMENT
ASSESSMENT  58F w/ PMHx Down Syndrome, nonverbal at baseline, Hypothyroidism, Cerebral palsy and Seizure Disorders presents to the ED from nursing facility/group home presented to ED for respiratory distress and high grade fever.     IMPRESSION  #Hypoxemia    CXR 3/13 Bilateral interstitial densities may reflect vascular congestion.  #HAP / aspiration with cavitary PNA  < from: CT Chest w/ IV Cont (02.21.24 @ 00:09) >  Bilateral pneumonia, left worse than right, with a left upper lobe   thick-walled cavity. Diffuse opacification of the   left lung is seen with what appears to be some mucus plugging centrally   within the left mainstem bronchus. There is a pleural effusion.   Reactive mediastinal   lymph nodes are seen.    MRSA PCR Result.: Negative (02-20-24 @ 13:42)    Procalcitonin, Serum: 0.16 (02-19-24 @ 16:00)- unremarkable     2/17 BCX NGTD     Rapid RVP Result: NotDetec (02-17-24 @ 19:06)    2/12 BCX NGTD    Procalcitonin, Serum: 0.10 (02-12-24 @ 11:17)    Rapid RVP Result: NotDetec (02-12-24 @ 10:28)    MRSA PCR Result.: Negative (02-12-24 @ 10:28)    2/9 BCX NGTD x2    2/3 BCX NGTD     2/1 BCX NGTD     2/1 UCX   >=3 organisms. Probable collection contamination.    1/31 BCX NG    Rapid RVP Result: NotDetec (02-04-24 @ 10:28)    MRSA PCR Result.: Negative (02-03-24 @ 21:32)     UA without significant pyuria   Procalcitonin, Serum: 0.22 (02-01-24 @ 16:00)--> Procalcitonin, Serum: 0.15 (02-03-24 @ 11:13), downtrending ; unremarkable   MRSA PCR Result.: Negative (01-22-24 @ 05:30)  < from: Xray Chest 1 View-PORTABLE IMMEDIATE (Xray Chest 1 View-PORTABLE IMMEDIATE .) (02.01.24 @ 03:02) >  Bibasal opacities without significant change.    quantiferon gold negative  #Acute hypoxic respiratory failure- Gram Negative pneumonia   #1/20 BCX 1/4 bottles : Staphylococcus hominis- contaminant   Repeat CX NG   #Severe Sepsis on admission  #RSV + 1/21  #Elevated fungitell   serum aspergillus galactomannan and histo are (-)  Fungitell: 76 (02-19-24 @ 16:00)  Fungitell: 63 (02-06-24 @ 11:27)  Fungitell: 325 (01-20-24 @ 21:23)  Fungitell: 138 (11-07-23 @ 08:08)  Fungitell: 115 (11-02-23 @ 11:40)  Fungitell: >500 pg/mL (10.17.23 @ 17:20) s/p empiric caspo   #Full thickness ulcer right heel- Appreciate burn/podiatry evaluation.  #History of Right planter foot ulcers - two full thickness ulcers - serous drainage with mild erythema with OM  - MR Foot No Cont, Right (10.16.23 @ 21:51): IMPRESSION: 1.  Limited exam. 2.  Osteomyelitis of the first metatarsal stump. 3.  Osteomyelitis of the second toe distal phalanx.  - s/p excidional debridement to and including bone 1st metatarsal with partial 2nd digit amputation - 1st metatarsal head resected - Wound Cx Proteus ESBL   #CKD 2-3 Creatinine: 0.7 mg/dL (02.02.24 @ 04:59)  #History of buttock ulcer  #Down syndrome/Cerebral Palsy     RECOMMENDATIONS  - BCX  - midline 2/26  - Repeat CT Chest w/   - Send MRSA nares, if negative D/C VANC - Vanc dosing AUC/ZANE per clinical pharmacist   - Send fungitell   - Continue Meropenem 1g q8h IV  - Grave prognosis, aggressive care is futile    If any questions, please send a message or call on [employer] Teams  Please continue to update ID with any pertinent new laboratory or radiographic findings.

## 2024-03-13 NOTE — CHART NOTE - NSCHARTNOTEFT_GEN_A_CORE
Received report from nurse that patient's O2stat was 75 and not going up. In addition, that patient's HR is 137 and rectal temp of 103.1. Patient was seen and examined at bedside, found to be stating on low 70s while on 15L NRB. Patient also appeared to be shivering. Mild wheezing appreciated on lung auscultation.     Interventions:  - CXR  - EKG  - Tylenol  - Cooling blanket  - ABG  - HFNC      Follow up:  - CXR  - EKG  - ABG  - Clinical improvement. Received report from nurse that patient's O2 sat was 75% and not improving on 15L NRB. In addition, that patient's HR is 137 and rectal temp of 103.1. Patient was seen and examined at bedside, found to be stating on low 70s while on 15L NRB. Patient also appeared to be shivering. Mild wheezing appreciated on lung auscultation.     Interventions:  - CXR: L side worsening  - EKG: Sinus Tach  - Tylenol  - Cooling blanket  - ABG: pO2 low (taken while on HFNC)  - HFNC + NRB      Follow up:  - CXR  - EKG  - ABG  - Clinical improvement.    ADDENDUM: After above interventions, remained tachycardic, HR 130s; Placed on HFNC + NRB; BP soft, holding Lasix. Attempted to visualize IVC however due to positioning unable to view >trial w/  cc bolus given sepsis Received report from nurse that patient's O2 sat was 75% and not improving on 15L NRB. In addition, that patient's HR is 137 and rectal temp of 103.1. Patient was seen and examined at bedside, found to be stating on low 70s while on 15L NRB. Patient also appeared to be shivering. Mild wheezing appreciated on lung auscultation. Rectal Temp of 103.     Ddx: Mucus Plug v. Aspiration Pna v. PE (low suspicion given on therapeutic Lovenox)    Interventions:  - CXR: L side worsening  - EKG: Sinus Tach  - Tylenol  - Cooling blanket  - ABG: pO2 low (taken while on HFNC)  - HFNC + NRB      Follow up:  - CXR  - EKG  - ABG  - Clinical improvement.    ADDENDUM: After above interventions, remained tachycardic, HR 130s; Placed on HFNC + NRB; BP soft, holding Lasix. Attempted to visualize IVC however due to positioning unable to view >trial w/  cc bolus given sepsis

## 2024-03-14 ENCOUNTER — TRANSCRIPTION ENCOUNTER (OUTPATIENT)
Age: 59
End: 2024-03-14

## 2024-03-14 LAB
ALBUMIN SERPL ELPH-MCNC: 3 G/DL — LOW (ref 3.5–5.2)
ALP SERPL-CCNC: 95 U/L — SIGNIFICANT CHANGE UP (ref 30–115)
ALT FLD-CCNC: 13 U/L — SIGNIFICANT CHANGE UP (ref 0–41)
ANION GAP SERPL CALC-SCNC: 9 MMOL/L — SIGNIFICANT CHANGE UP (ref 7–14)
AST SERPL-CCNC: 17 U/L — SIGNIFICANT CHANGE UP (ref 0–41)
BASOPHILS # BLD AUTO: 0.05 K/UL — SIGNIFICANT CHANGE UP (ref 0–0.2)
BASOPHILS NFR BLD AUTO: 0.2 % — SIGNIFICANT CHANGE UP (ref 0–1)
BILIRUB SERPL-MCNC: 0.5 MG/DL — SIGNIFICANT CHANGE UP (ref 0.2–1.2)
BUN SERPL-MCNC: 15 MG/DL — SIGNIFICANT CHANGE UP (ref 10–20)
CALCIUM SERPL-MCNC: 9.3 MG/DL — SIGNIFICANT CHANGE UP (ref 8.4–10.5)
CHLORIDE SERPL-SCNC: 95 MMOL/L — LOW (ref 98–110)
CO2 SERPL-SCNC: 30 MMOL/L — SIGNIFICANT CHANGE UP (ref 17–32)
CREAT SERPL-MCNC: 0.6 MG/DL — LOW (ref 0.7–1.5)
EGFR: 105 ML/MIN/1.73M2 — SIGNIFICANT CHANGE UP
EOSINOPHIL # BLD AUTO: 0.03 K/UL — SIGNIFICANT CHANGE UP (ref 0–0.7)
EOSINOPHIL NFR BLD AUTO: 0.1 % — SIGNIFICANT CHANGE UP (ref 0–8)
GLUCOSE BLDC GLUCOMTR-MCNC: 237 MG/DL — HIGH (ref 70–99)
GLUCOSE SERPL-MCNC: 201 MG/DL — HIGH (ref 70–99)
HCT VFR BLD CALC: 25.1 % — LOW (ref 37–47)
HGB BLD-MCNC: 7.7 G/DL — LOW (ref 12–16)
IMM GRANULOCYTES NFR BLD AUTO: 0.7 % — HIGH (ref 0.1–0.3)
LYMPHOCYTES # BLD AUTO: 1.4 K/UL — SIGNIFICANT CHANGE UP (ref 1.2–3.4)
LYMPHOCYTES # BLD AUTO: 6.8 % — LOW (ref 20.5–51.1)
MAGNESIUM SERPL-MCNC: 2.2 MG/DL — SIGNIFICANT CHANGE UP (ref 1.8–2.4)
MCHC RBC-ENTMCNC: 23.6 PG — LOW (ref 27–31)
MCHC RBC-ENTMCNC: 30.7 G/DL — LOW (ref 32–37)
MCV RBC AUTO: 77 FL — LOW (ref 81–99)
MONOCYTES # BLD AUTO: 0.2 K/UL — SIGNIFICANT CHANGE UP (ref 0.1–0.6)
MONOCYTES NFR BLD AUTO: 1 % — LOW (ref 1.7–9.3)
NEUTROPHILS # BLD AUTO: 18.91 K/UL — HIGH (ref 1.4–6.5)
NEUTROPHILS NFR BLD AUTO: 91.2 % — HIGH (ref 42.2–75.2)
NRBC # BLD: 0 /100 WBCS — SIGNIFICANT CHANGE UP (ref 0–0)
PLATELET # BLD AUTO: 514 K/UL — HIGH (ref 130–400)
PMV BLD: 9.8 FL — SIGNIFICANT CHANGE UP (ref 7.4–10.4)
POTASSIUM SERPL-MCNC: 3.7 MMOL/L — SIGNIFICANT CHANGE UP (ref 3.5–5)
POTASSIUM SERPL-SCNC: 3.7 MMOL/L — SIGNIFICANT CHANGE UP (ref 3.5–5)
PROT SERPL-MCNC: 7 G/DL — SIGNIFICANT CHANGE UP (ref 6–8)
RBC # BLD: 3.26 M/UL — LOW (ref 4.2–5.4)
RBC # FLD: 20.1 % — HIGH (ref 11.5–14.5)
SODIUM SERPL-SCNC: 134 MMOL/L — LOW (ref 135–146)
WBC # BLD: 20.73 K/UL — HIGH (ref 4.8–10.8)
WBC # FLD AUTO: 20.73 K/UL — HIGH (ref 4.8–10.8)

## 2024-03-14 PROCEDURE — 99233 SBSQ HOSP IP/OBS HIGH 50: CPT

## 2024-03-14 RX ORDER — SODIUM CHLORIDE 9 MG/ML
250 INJECTION, SOLUTION INTRAVENOUS ONCE
Refills: 0 | Status: COMPLETED | OUTPATIENT
Start: 2024-03-14 | End: 2024-03-14

## 2024-03-14 RX ADMIN — Medication 4 MILLILITER(S): at 08:24

## 2024-03-14 RX ADMIN — Medication 3 MILLILITER(S): at 20:42

## 2024-03-14 RX ADMIN — LEVETIRACETAM 400 MILLIGRAM(S): 250 TABLET, FILM COATED ORAL at 17:13

## 2024-03-14 RX ADMIN — Medication 400 MILLIGRAM(S): at 03:38

## 2024-03-14 RX ADMIN — Medication 1 TABLET(S): at 05:17

## 2024-03-14 RX ADMIN — LEVETIRACETAM 400 MILLIGRAM(S): 250 TABLET, FILM COATED ORAL at 05:16

## 2024-03-14 RX ADMIN — Medication 1000 MILLIGRAM(S): at 06:05

## 2024-03-14 RX ADMIN — MIDODRINE HYDROCHLORIDE 20 MILLIGRAM(S): 2.5 TABLET ORAL at 05:19

## 2024-03-14 RX ADMIN — POLYETHYLENE GLYCOL 3350 17 GRAM(S): 17 POWDER, FOR SOLUTION ORAL at 23:43

## 2024-03-14 RX ADMIN — Medication 1 DROP(S): at 17:13

## 2024-03-14 RX ADMIN — GABAPENTIN 300 MILLIGRAM(S): 400 CAPSULE ORAL at 05:19

## 2024-03-14 RX ADMIN — Medication 1 DROP(S): at 05:17

## 2024-03-14 RX ADMIN — Medication 1 TABLET(S): at 17:12

## 2024-03-14 RX ADMIN — Medication 2 SPRAY(S): at 05:20

## 2024-03-14 RX ADMIN — Medication 650 MILLIGRAM(S): at 03:11

## 2024-03-14 RX ADMIN — Medication 250 MILLIGRAM(S): at 05:18

## 2024-03-14 RX ADMIN — Medication 3 MILLILITER(S): at 08:24

## 2024-03-14 RX ADMIN — PANTOPRAZOLE SODIUM 40 MILLIGRAM(S): 20 TABLET, DELAYED RELEASE ORAL at 05:16

## 2024-03-14 RX ADMIN — MEROPENEM 100 MILLIGRAM(S): 1 INJECTION INTRAVENOUS at 22:54

## 2024-03-14 RX ADMIN — Medication 1 APPLICATION(S): at 17:13

## 2024-03-14 RX ADMIN — Medication 250 MILLIGRAM(S): at 05:19

## 2024-03-14 RX ADMIN — SODIUM CHLORIDE 500 MILLILITER(S): 9 INJECTION, SOLUTION INTRAVENOUS at 05:16

## 2024-03-14 RX ADMIN — GABAPENTIN 300 MILLIGRAM(S): 400 CAPSULE ORAL at 17:11

## 2024-03-14 RX ADMIN — ENOXAPARIN SODIUM 50 MILLIGRAM(S): 100 INJECTION SUBCUTANEOUS at 05:16

## 2024-03-14 RX ADMIN — Medication 3 MILLILITER(S): at 03:25

## 2024-03-14 RX ADMIN — MIDODRINE HYDROCHLORIDE 20 MILLIGRAM(S): 2.5 TABLET ORAL at 17:12

## 2024-03-14 RX ADMIN — ENOXAPARIN SODIUM 50 MILLIGRAM(S): 100 INJECTION SUBCUTANEOUS at 17:12

## 2024-03-14 RX ADMIN — Medication 4 MILLILITER(S): at 20:42

## 2024-03-14 RX ADMIN — Medication 40 MILLIGRAM(S): at 05:53

## 2024-03-14 RX ADMIN — MEROPENEM 100 MILLIGRAM(S): 1 INJECTION INTRAVENOUS at 03:26

## 2024-03-14 RX ADMIN — Medication 250 MILLIGRAM(S): at 17:20

## 2024-03-14 RX ADMIN — Medication 1 APPLICATION(S): at 05:20

## 2024-03-14 RX ADMIN — Medication 250 MILLIGRAM(S): at 17:13

## 2024-03-14 NOTE — PROGRESS NOTE ADULT - SUBJECTIVE AND OBJECTIVE BOX
JERICHO TEA  58y, Female  Allergy: No Known Allergies      LOS  54d    CHIEF COMPLAINT: Respiratory Distress (13 Mar 2024 14:58)      INTERVAL EVENTS/HPI  - T(F): , Max: 103.6 (03-14-24 @ 03:40) febrile   - WBC Count: 13.97 (03-13-24 @ 08:35)  WBC Count: 13.99 (03-13-24 @ 07:08)     - Creatinine: 0.6 (03-13-24 @ 08:35)  Creatinine: 0.5 (03-13-24 @ 07:08)  - Procalcitonin, Serum: 0.19 ng/mL (03-13-24 @ 07:08  - D-Dimer Assay, Quantitative: 397 ng/mL DDU (03-13-24 @ 07:08)    -     ROS  cannot obtain secondary to patient's sedation and/or mental status    VITALS:  T(F): 99.7, Max: 103.6 (03-14-24 @ 03:40)  HR: 104  BP: 97/51  RR: 20Vital Signs Last 24 Hrs  T(C): 37.6 (14 Mar 2024 06:26), Max: 39.8 (14 Mar 2024 03:40)  T(F): 99.7 (14 Mar 2024 06:26), Max: 103.6 (14 Mar 2024 03:40)  HR: 104 (14 Mar 2024 06:26) (104 - 145)  BP: 97/51 (14 Mar 2024 04:29) (97/51 - 133/72)  BP(mean): --  RR: 20 (14 Mar 2024 06:26) (18 - 20)  SpO2: 98% (14 Mar 2024 06:26) (92% - 98%)    Parameters below as of 14 Mar 2024 06:26  Patient On (Oxygen Delivery Method): High Flow        PHYSICAL EXAM:  ***    FH: Non-contributory  Social Hx: Non-contributory    TESTS & MEASUREMENTS:                        8.7    13.97 )-----------( 622      ( 13 Mar 2024 08:35 )             28.6     03-13    138  |  97<L>  |  16  ----------------------------<  162<H>  4.4   |  30  |  0.6<L>    Ca    9.5      13 Mar 2024 08:35  Mg     2.5     03-13    TPro  7.6  /  Alb  3.4<L>  /  TBili  0.3  /  DBili  x   /  AST  17  /  ALT  13  /  AlkPhos  108  03-13      LIVER FUNCTIONS - ( 13 Mar 2024 07:08 )  Alb: 3.4 g/dL / Pro: 7.6 g/dL / ALK PHOS: 108 U/L / ALT: 13 U/L / AST: 17 U/L / GGT: x           Urinalysis Basic - ( 13 Mar 2024 08:35 )    Color: x / Appearance: x / SG: x / pH: x  Gluc: 162 mg/dL / Ketone: x  / Bili: x / Urobili: x   Blood: x / Protein: x / Nitrite: x   Leuk Esterase: x / RBC: x / WBC x   Sq Epi: x / Non Sq Epi: x / Bacteria: x        Culture - Blood (collected 03-08-24 @ 08:30)  Source: .Blood None  Final Report (03-13-24 @ 14:01):    No growth at 5 days    Culture - Blood (collected 03-07-24 @ 22:12)  Source: .Blood Blood-Peripheral  Final Report (03-13-24 @ 07:00):    No growth at 5 days    Culture - Blood (collected 02-25-24 @ 11:47)  Source: .Blood None  Final Report (03-01-24 @ 20:00):    No growth at 5 days    Culture - Blood (collected 02-19-24 @ 11:23)  Source: .Blood None  Final Report (02-24-24 @ 20:01):    No growth at 5 days    Culture - Blood (collected 02-17-24 @ 20:41)  Source: .Blood None  Final Report (02-23-24 @ 06:00):    No growth at 5 days        Lactate, Blood: 1.7 mmol/L (03-13-24 @ 07:08)      INFECTIOUS DISEASES TESTING  Procalcitonin, Serum: 0.19 (03-13-24 @ 07:08)  Fungitell: 73 (02-23-24 @ 18:19)  MRSA PCR Result.: Negative (02-20-24 @ 13:42)  Fungitell: 76 (02-19-24 @ 16:00)  Procalcitonin, Serum: 0.16 (02-19-24 @ 16:00)  Procalcitonin, Serum: 0.20 (02-19-24 @ 11:23)  Rapid RVP Result: NotDetec (02-17-24 @ 19:06)  Procalcitonin, Serum: 0.11 (02-17-24 @ 10:41)  MRSA PCR Result.: Negative (02-15-24 @ 06:20)  Procalcitonin, Serum: 0.10 (02-12-24 @ 11:17)  Rapid RVP Result: NotDetec (02-12-24 @ 10:28)  MRSA PCR Result.: Negative (02-12-24 @ 10:28)  Fungitell: 63 (02-06-24 @ 11:27)  Fungitell: 65 (02-06-24 @ 04:43)  Rapid RVP Result: NotDetec (02-04-24 @ 10:28)  MRSA PCR Result.: Negative (02-03-24 @ 21:32)  Procalcitonin, Serum: 0.15 (02-03-24 @ 11:13)  Procalcitonin, Serum: 0.22 (02-01-24 @ 16:00)  MRSA PCR Result.: Negative (01-22-24 @ 05:30)  Legionella Antigen, Urine: Negative (01-21-24 @ 05:00)  Rapid RVP Result: Detected (01-21-24 @ 00:58)  Procalcitonin, Serum: 0.51 (01-20-24 @ 21:23)  Fungitell: 325 (01-20-24 @ 21:23)  Fungitell: 138 (11-07-23 @ 08:08)  Fungitell: 115 (11-02-23 @ 11:40)  Procalcitonin, Serum: 0.04 (11-02-23 @ 11:40)  Procalcitonin, Serum: 0.26 (10-24-23 @ 11:00)  MRSA PCR Result.: Negative (10-17-23 @ 17:20)  Fungitell: >500 (10-17-23 @ 17:20)  Procalcitonin, Serum: 4.36 (10-17-23 @ 17:20)  Procalcitonin, Serum: 4.18 (10-17-23 @ 12:35)  Procalcitonin, Serum: 0.07 (10-15-23 @ 07:28)  COVID-19 PCR: NotDetec (10-12-23 @ 09:14)  Procalcitonin, Serum: 0.40 (10-07-23 @ 10:54)  Legionella Antigen, Urine: Negative (09-30-23 @ 14:36)  MRSA PCR Result.: Negative (09-29-23 @ 16:50)  Procalcitonin, Serum: 9.78 (08-12-23 @ 11:25)  MRSA PCR Result.: Negative (08-12-23 @ 06:10)  Rapid RVP Result: NotDetec (08-12-23 @ 01:33)  Procalcitonin, Serum: 0.63 (08-10-23 @ 08:46)  Procalcitonin, Serum: 7.82 (08-04-23 @ 16:13)  MRSA PCR Result.: Negative (08-04-23 @ 14:52)  Rapid RVP Result: NotDetec (08-04-23 @ 03:00)      INFLAMMATORY MARKERS  Sedimentation Rate, Erythrocyte: 100 mm/Hr (02-03-24 @ 11:13)  C-Reactive Protein, Serum: 214.2 mg/L (02-03-24 @ 11:13)  C-Reactive Protein, Serum: 132.3 mg/L (02-01-24 @ 16:00)  Sedimentation Rate, Erythrocyte: 67 mm/Hr (10-15-23 @ 07:28)      RADIOLOGY & ADDITIONAL TESTS:  I have personally reviewed the last available Chest xray  CXR  Xray Chest 1 View- PORTABLE-Urgent:   ACC: [id number] EXAM:  XR CHEST PORTABLE URGENT 1V   ORDERED BY: CRISTOPHER LOPEZ     PROCEDURE DATE:  03/13/2024          INTERPRETATION:  Clinical History / Reason for exam: Desaturation    Comparison : 3/7/2024.    Technique/Positioning: Frontal view.    Findings:    Support devices: Feeding tube is with its tip in the region of the   stomach.    Cardiac/mediastinum/hilum: Unremarkable.    Lung parenchyma/Pleura: Bilateral interstitial densities without effusion   or pneumothorax..    Skeleton/soft tissues: No significant osseous abnormality is seen.      Impression:      Bilateral interstitial densities may reflect vascular congestion.    --- End of Report ---            VARUN GAGE MD; Attending Radiologist  This document has been electronically signed. Mar 13 2024  6:59AM (03-13-24 @ 06:52)      CT  CT Chest w/ IV Cont:   ACC: [id number] EXAM:  CT CHEST IC   ORDERED BY: BRIAN LAURA     PROCEDURE DATE:  03/13/2024          INTERPRETATION:  Indication. Desaturation. Follow-up. The    Technique: CT of the chest was performed after administration of   intravenous contrastper 80 cc administered of Omnipaque 350 (20 cc   discarded).  Coronal and sagittal reformatted images as well as MIP   reconstructions were performed.    Comparison: CT thorax 2/21/2024.    Findings:    Central airways/ Lung parenchyma/ pleura : Limited evaluation due to   motion. Debris in the airways. Extensive patchy  left-sided pulmonary   opacities, slightly decreased from prior CT. Patchy right-sided opacities   also demonstrated. Findings are likely infectious in etiology. No pleural   effusion or pneumothorax.    Mediastinum/Great vessels:No pericardial effusion. No lymphadenopathy    Upper abdomen:Feeding tube coursing into stomach past field-of-view.   Cholelithiasis. Moderate hiatal hernia.    Osseous structures:Degenerative changes.      Impression:    Extensive patchy  left-sided pulmonary opacities, slightly decreased from   prior CT. Patchy right-sided opacities also demonstrated. Findings are   likely infectious in etiology. Recommend follow-up.    --- End of Report ---            CHANA GIL MD; Attending Radiologist  This document has been electronically signed. Mar 13 2024  5:21PM (03-13-24 @ 16:40)      CARDIOLOGY TESTING  12 Lead ECG:   Ventricular Rate 135 BPM    Atrial Rate 135 BPM    P-R Interval 116 ms    QRS Duration 66 ms    Q-T Interval 294 ms    QTC Calculation(Bazett) 441 ms    P Axis 34 degrees    R Axis 49 degrees    T Axis 30 degrees    Diagnosis Line Sinus tachycardia  Possible Left atrial enlargement  Borderline ECG    Confirmed by MARJAN MENDES MD ([id number]) on 3/14/2024 6:37:25 AM (03-13-24 @ 23:14)  12 Lead ECG:   Ventricular Rate 107 BPM    Atrial Rate 107 BPM    P-R Interval 120 ms    QRS Duration 66 ms    Q-T Interval 322 ms    QTC Calculation(Bazett) 429 ms    P Axis 50 degrees    R Axis 90 degrees    T Axis 48 degrees    Diagnosis Line Sinus tachycardia  Rightward axis  Borderline ECG    Confirmed by caleb roberts ([id number]) on 3/13/2024 12:38:34 PM (03-13-24 @ 07:52)      MEDICATIONS  acetylcysteine 20%  Inhalation 4  albuterol/ipratropium for Nebulization 3  AQUAPHOR (petrolatum Ointment) 1  artificial  tears Solution 1  calcium carbonate   1250 mG (OsCal) 1  chlorhexidine 2% Cloths 1  chlorhexidine 2% Cloths 1  enoxaparin Injectable 50  erythromycin   Ointment 1  gabapentin 300  levETIRAcetam  IVPB 500  melatonin 3  meropenem  IVPB 1000  methylPREDNISolone sodium succinate Injectable 40  midodrine. 20  pantoprazole  Injectable 40  polyethylene glycol 3350 17  raloxifene 60  saccharomyces boulardii 250  senna 2  sodium chloride 0.65% Nasal 2  sodium chloride 0.9% Bolus 500  vancomycin  IVPB 750      WEIGHT  Weight (kg): 52.3 (01-21-24 @ 08:00)  Creatinine: 0.6 mg/dL (03-13-24 @ 08:35)  Creatinine: 0.5 mg/dL (03-13-24 @ 07:08)      ANTIBIOTICS:  meropenem  IVPB 1000 milliGRAM(s) IV Intermittent every 8 hours  vancomycin  IVPB 750 milliGRAM(s) IV Intermittent every 12 hours      All available historical records have been reviewed   JERICHO TEA  58y, Female  Allergy: No Known Allergies      LOS  54d    CHIEF COMPLAINT: Respiratory Distress (13 Mar 2024 14:58)      INTERVAL EVENTS/HPI  - T(F): , Max: 103.6 (03-14-24 @ 03:40) febrile   - WBC Count: 13.97 (03-13-24 @ 08:35)  WBC Count: 13.99 (03-13-24 @ 07:08)     - Creatinine: 0.6 (03-13-24 @ 08:35)  Creatinine: 0.5 (03-13-24 @ 07:08)  - Procalcitonin, Serum: 0.19 ng/mL (03-13-24 @ 07:08  - D-Dimer Assay, Quantitative: 397 ng/mL DDU (03-13-24 @ 07:08)    -     ROS  cannot obtain secondary to patient's sedation and/or mental status    VITALS:  T(F): 99.7, Max: 103.6 (03-14-24 @ 03:40)  HR: 104  BP: 97/51  RR: 20Vital Signs Last 24 Hrs  T(C): 37.6 (14 Mar 2024 06:26), Max: 39.8 (14 Mar 2024 03:40)  T(F): 99.7 (14 Mar 2024 06:26), Max: 103.6 (14 Mar 2024 03:40)  HR: 104 (14 Mar 2024 06:26) (104 - 145)  BP: 97/51 (14 Mar 2024 04:29) (97/51 - 133/72)  BP(mean): --  RR: 20 (14 Mar 2024 06:26) (18 - 20)  SpO2: 98% (14 Mar 2024 06:26) (92% - 98%)    Parameters below as of 14 Mar 2024 06:26  Patient On (Oxygen Delivery Method): High Flow        PHYSICAL EXAM:  Gen: chronically ill appearing face mask HFNC  HEENT: Normocephalic, atraumatic NGT  Neck: supple, no lymphadenopathy  CV: Regular rate & regular rhythm  Lungs: decreased BS at bases, no fremitus  Abdomen: Soft, BS present  Ext: Warm, well perfused contracted  Neuro: non focal, not following commands,  Skin: no rash, no erythema  Lines: no phlebitis     FH: Non-contributory  Social Hx: Non-contributory    TESTS & MEASUREMENTS:                        8.7    13.97 )-----------( 622      ( 13 Mar 2024 08:35 )             28.6     03-13    138  |  97<L>  |  16  ----------------------------<  162<H>  4.4   |  30  |  0.6<L>    Ca    9.5      13 Mar 2024 08:35  Mg     2.5     03-13    TPro  7.6  /  Alb  3.4<L>  /  TBili  0.3  /  DBili  x   /  AST  17  /  ALT  13  /  AlkPhos  108  03-13      LIVER FUNCTIONS - ( 13 Mar 2024 07:08 )  Alb: 3.4 g/dL / Pro: 7.6 g/dL / ALK PHOS: 108 U/L / ALT: 13 U/L / AST: 17 U/L / GGT: x           Urinalysis Basic - ( 13 Mar 2024 08:35 )    Color: x / Appearance: x / SG: x / pH: x  Gluc: 162 mg/dL / Ketone: x  / Bili: x / Urobili: x   Blood: x / Protein: x / Nitrite: x   Leuk Esterase: x / RBC: x / WBC x   Sq Epi: x / Non Sq Epi: x / Bacteria: x        Culture - Blood (collected 03-08-24 @ 08:30)  Source: .Blood None  Final Report (03-13-24 @ 14:01):    No growth at 5 days    Culture - Blood (collected 03-07-24 @ 22:12)  Source: .Blood Blood-Peripheral  Final Report (03-13-24 @ 07:00):    No growth at 5 days    Culture - Blood (collected 02-25-24 @ 11:47)  Source: .Blood None  Final Report (03-01-24 @ 20:00):    No growth at 5 days    Culture - Blood (collected 02-19-24 @ 11:23)  Source: .Blood None  Final Report (02-24-24 @ 20:01):    No growth at 5 days    Culture - Blood (collected 02-17-24 @ 20:41)  Source: .Blood None  Final Report (02-23-24 @ 06:00):    No growth at 5 days        Lactate, Blood: 1.7 mmol/L (03-13-24 @ 07:08)      INFECTIOUS DISEASES TESTING  Procalcitonin, Serum: 0.19 (03-13-24 @ 07:08)  Fungitell: 73 (02-23-24 @ 18:19)  MRSA PCR Result.: Negative (02-20-24 @ 13:42)  Fungitell: 76 (02-19-24 @ 16:00)  Procalcitonin, Serum: 0.16 (02-19-24 @ 16:00)  Procalcitonin, Serum: 0.20 (02-19-24 @ 11:23)  Rapid RVP Result: NotDetec (02-17-24 @ 19:06)  Procalcitonin, Serum: 0.11 (02-17-24 @ 10:41)  MRSA PCR Result.: Negative (02-15-24 @ 06:20)  Procalcitonin, Serum: 0.10 (02-12-24 @ 11:17)  Rapid RVP Result: NotDetec (02-12-24 @ 10:28)  MRSA PCR Result.: Negative (02-12-24 @ 10:28)  Fungitell: 63 (02-06-24 @ 11:27)  Fungitell: 65 (02-06-24 @ 04:43)  Rapid RVP Result: NotDetec (02-04-24 @ 10:28)  MRSA PCR Result.: Negative (02-03-24 @ 21:32)  Procalcitonin, Serum: 0.15 (02-03-24 @ 11:13)  Procalcitonin, Serum: 0.22 (02-01-24 @ 16:00)  MRSA PCR Result.: Negative (01-22-24 @ 05:30)  Legionella Antigen, Urine: Negative (01-21-24 @ 05:00)  Rapid RVP Result: Detected (01-21-24 @ 00:58)  Procalcitonin, Serum: 0.51 (01-20-24 @ 21:23)  Fungitell: 325 (01-20-24 @ 21:23)  Fungitell: 138 (11-07-23 @ 08:08)  Fungitell: 115 (11-02-23 @ 11:40)  Procalcitonin, Serum: 0.04 (11-02-23 @ 11:40)  Procalcitonin, Serum: 0.26 (10-24-23 @ 11:00)  MRSA PCR Result.: Negative (10-17-23 @ 17:20)  Fungitell: >500 (10-17-23 @ 17:20)  Procalcitonin, Serum: 4.36 (10-17-23 @ 17:20)  Procalcitonin, Serum: 4.18 (10-17-23 @ 12:35)  Procalcitonin, Serum: 0.07 (10-15-23 @ 07:28)  COVID-19 PCR: NotDetec (10-12-23 @ 09:14)  Procalcitonin, Serum: 0.40 (10-07-23 @ 10:54)  Legionella Antigen, Urine: Negative (09-30-23 @ 14:36)  MRSA PCR Result.: Negative (09-29-23 @ 16:50)  Procalcitonin, Serum: 9.78 (08-12-23 @ 11:25)  MRSA PCR Result.: Negative (08-12-23 @ 06:10)  Rapid RVP Result: NotDetec (08-12-23 @ 01:33)  Procalcitonin, Serum: 0.63 (08-10-23 @ 08:46)  Procalcitonin, Serum: 7.82 (08-04-23 @ 16:13)  MRSA PCR Result.: Negative (08-04-23 @ 14:52)  Rapid RVP Result: NotDetec (08-04-23 @ 03:00)      INFLAMMATORY MARKERS  Sedimentation Rate, Erythrocyte: 100 mm/Hr (02-03-24 @ 11:13)  C-Reactive Protein, Serum: 214.2 mg/L (02-03-24 @ 11:13)  C-Reactive Protein, Serum: 132.3 mg/L (02-01-24 @ 16:00)  Sedimentation Rate, Erythrocyte: 67 mm/Hr (10-15-23 @ 07:28)      RADIOLOGY & ADDITIONAL TESTS:  I have personally reviewed the last available Chest xray  CXR  Xray Chest 1 View- PORTABLE-Urgent:   ACC: [id number] EXAM:  XR CHEST PORTABLE URGENT 1V   ORDERED BY: CRISTOPHER LOPEZ     PROCEDURE DATE:  03/13/2024          INTERPRETATION:  Clinical History / Reason for exam: Desaturation    Comparison : 3/7/2024.    Technique/Positioning: Frontal view.    Findings:    Support devices: Feeding tube is with its tip in the region of the   stomach.    Cardiac/mediastinum/hilum: Unremarkable.    Lung parenchyma/Pleura: Bilateral interstitial densities without effusion   or pneumothorax..    Skeleton/soft tissues: No significant osseous abnormality is seen.      Impression:      Bilateral interstitial densities may reflect vascular congestion.    --- End of Report ---            VARUN GAGE MD; Attending Radiologist  This document has been electronically signed. Mar 13 2024  6:59AM (03-13-24 @ 06:52)      CT  CT Chest w/ IV Cont:   ACC: [id number] EXAM:  CT CHEST IC   ORDERED BY: BRIAN LAURA     PROCEDURE DATE:  03/13/2024          INTERPRETATION:  Indication. Desaturation. Follow-up. The    Technique: CT of the chest was performed after administration of   intravenous contrastper 80 cc administered of Omnipaque 350 (20 cc   discarded).  Coronal and sagittal reformatted images as well as MIP   reconstructions were performed.    Comparison: CT thorax 2/21/2024.    Findings:    Central airways/ Lung parenchyma/ pleura : Limited evaluation due to   motion. Debris in the airways. Extensive patchy  left-sided pulmonary   opacities, slightly decreased from prior CT. Patchy right-sided opacities   also demonstrated. Findings are likely infectious in etiology. No pleural   effusion or pneumothorax.    Mediastinum/Great vessels:No pericardial effusion. No lymphadenopathy    Upper abdomen:Feeding tube coursing into stomach past field-of-view.   Cholelithiasis. Moderate hiatal hernia.    Osseous structures:Degenerative changes.      Impression:    Extensive patchy  left-sided pulmonary opacities, slightly decreased from   prior CT. Patchy right-sided opacities also demonstrated. Findings are   likely infectious in etiology. Recommend follow-up.    --- End of Report ---            CHANA GIL MD; Attending Radiologist  This document has been electronically signed. Mar 13 2024  5:21PM (03-13-24 @ 16:40)      CARDIOLOGY TESTING  12 Lead ECG:   Ventricular Rate 135 BPM    Atrial Rate 135 BPM    P-R Interval 116 ms    QRS Duration 66 ms    Q-T Interval 294 ms    QTC Calculation(Bazett) 441 ms    P Axis 34 degrees    R Axis 49 degrees    T Axis 30 degrees    Diagnosis Line Sinus tachycardia  Possible Left atrial enlargement  Borderline ECG    Confirmed by MARJAN MENDES MD ([id number]) on 3/14/2024 6:37:25 AM (03-13-24 @ 23:14)  12 Lead ECG:   Ventricular Rate 107 BPM    Atrial Rate 107 BPM    P-R Interval 120 ms    QRS Duration 66 ms    Q-T Interval 322 ms    QTC Calculation(Bazett) 429 ms    P Axis 50 degrees    R Axis 90 degrees    T Axis 48 degrees    Diagnosis Line Sinus tachycardia  Rightward axis  Borderline ECG    Confirmed by caleb roberts ([id number]) on 3/13/2024 12:38:34 PM (03-13-24 @ 07:52)      MEDICATIONS  acetylcysteine 20%  Inhalation 4  albuterol/ipratropium for Nebulization 3  AQUAPHOR (petrolatum Ointment) 1  artificial  tears Solution 1  calcium carbonate   1250 mG (OsCal) 1  chlorhexidine 2% Cloths 1  chlorhexidine 2% Cloths 1  enoxaparin Injectable 50  erythromycin   Ointment 1  gabapentin 300  levETIRAcetam  IVPB 500  melatonin 3  meropenem  IVPB 1000  methylPREDNISolone sodium succinate Injectable 40  midodrine. 20  pantoprazole  Injectable 40  polyethylene glycol 3350 17  raloxifene 60  saccharomyces boulardii 250  senna 2  sodium chloride 0.65% Nasal 2  sodium chloride 0.9% Bolus 500  vancomycin  IVPB 750      WEIGHT  Weight (kg): 52.3 (01-21-24 @ 08:00)  Creatinine: 0.6 mg/dL (03-13-24 @ 08:35)  Creatinine: 0.5 mg/dL (03-13-24 @ 07:08)      ANTIBIOTICS:  meropenem  IVPB 1000 milliGRAM(s) IV Intermittent every 8 hours  vancomycin  IVPB 750 milliGRAM(s) IV Intermittent every 12 hours      All available historical records have been reviewed

## 2024-03-14 NOTE — PROGRESS NOTE ADULT - ASSESSMENT
58F with Down syndrome, nonverbal at baseline, hypothyroidism, CP, and seizure disorder here from [facility] with fever and respiratory distress. Found to be septic on admission, from CAP/aspiration PNA, on IV vanco and meropenem, with course c/b continued fevers, and bacteremia with S. hominis. Also failed FEES, has NGT in place and will likely need PEG. Is requiring HFNC alternating with BiPAP. Is also on midodrine for hypotension. Patient is full code. Of note patient is under [place] [facility]. Palliative care consulted for [facility].

## 2024-03-14 NOTE — CHART NOTE - NSCHARTNOTEFT_GEN_A_CORE
Transfer from: 4a   Transfer to: SDU     58y old  Female who presents with a chief complaint of Respiratory Distress (04 Mar 2024 15:00)      HPI:  58F w/ PMHx Down Syndrome, nonverbal at baseline, Hypothyroidism, Cerebral palsy and Seizure Disorders presents to the ED from nursing facility/group home ([facility]) presented to ED for respiratory distress and high grade fever. Patient found to be septic on admission. As per isaias Savage at bedside, patient had been coughing and congested for 3x days prior to admission. Denies fevers, chills, n/v/d or pain prior to admission. Patient is on a puree diet at baseline.    Vitals: Temp 104.5F (rectal), /74, , RR 24, SpO2 100% on BiPAP  Labs: Hgb 9.5 (previously 11.1), Platelet 422, INR 1.43, VBG Lactate 2.1  Imaging: CXR shows possible RML infiltrate    In the ED:  - s/p Cefepime 2g IV x1  - s/p Levaquin 750mg IV x1  - s/p 2L LR bolus    Admitted for management of acute respiratory distress 2/2 CAP/Aspiration PNA (20 Jan 2024 18:22)    ----------------------------------------------------  FLOOR COURSE  Initially stable on the floor pending discharge once nutritional status was improved; patient requiring NGT feeding per SLP secondary to dysphagia, and failing FEES. Plan was to repeat SLP evaluation and if failing to consider PEG placement. Patient was received persistently tachycardic EKG NSR initially afebrile no evidence of DVT being treated with metoprolol, however on 1/31 spiked fever triggering septic work up, patient found congested, likely aspiration, required NRB/Highflow/Venti mask, started meropenem, upgraded to SDU for respiratory status monitoring.     STEPDOWN COURSE  Pt was made DNR/DNI ([facility]); oxygenation status went from HFNC--->NC, tx'ed with danna and caspofungin  plan is to complete caspo until 3/10 and if no improvement then change care to CMO as approved by consumer advisory board [place]  pt afebrile and stable for [facility]    FLOOR COURSE:   Patient completed course of danna and caspo through 3/10 as per ID recs. Remained on midodrine 20mg for hypotension. Continued on meropenem as no clinical improvement.     UPGRADE EVENT:   Received report from nurse that patient's O2 sat was 75% and not improving on 15L NRB. In addition, that patient's HR is 137 and rectal temp of 103.1. Patient was seen and examined at bedside, found to be stating on low 70s while on 15L NRB. Patient also appeared to be shivering. Mild wheezing appreciated on lung auscultation. Rectal Temp of 103.     Ddx: Mucus Plug v. Aspiration Pna v. PE (low suspicion given on therapeutic Lovenox)    Interventions:  - CXR: L side worsening  - EKG: Sinus Tach  - Tylenol  - Cooling blanket  - ABG: pO2 low (taken while on HFNC)  - HFNC + NRB    After above interventions, remained tachycardic, HR 130s; Placed on HFNC + NRB; BP soft, holding Lasix. Attempted to visualize IVC however due to positioning unable to view, trialed with 250cc bolus, patient responsive, given a total of 500cc of fluids.     ASSESSMENT/PLAN:   # Sepsis POA / Acute hypoxic resp failure / RSV bronchiolitis /  H/o Dysphagia/ suspected aspiration    - continue midodrine 20 Q8H  - completed caspo (end 3/10) per ID   - Failed FEES,  NG tube for feedings and medications, patient might  need PEG tube once 02 requirements decrease ( in case if level of care is still the same, consumer advisory board agreed for CMO ( paperwork in the the paper chart)   - c/w duoneb, chest PT, aspiration precautions   - pulmonary is following  - aggressive chest PT with vest, aspiration precautions and suction   - per ID, continue meropenem 1g q8h IV and add levaquin 750mg q24h  - f/u MRSA nares, dc vanco if negative   - f/u fungitell   - f/u BCx   - sputum culture if possible     # Conjunctivitis   - c/w erythromycin ointment both eyes     #  Elevated Troponin , type 2 MI/  Sinus tachycardia  - c/w telemetry monitoring   - Repeat EKG: Normal sinus rhythm  - c/w  metoprolol  - TTE 2/19 normal EF no DD or valve abnormalities    # Seizure Disorder  - c/w  Keppra   - seizure precautions  - keep Mg above 2.0     # H/o lower ext DVT   - c/w therapeutic Lovenox, Eliquis on dc    #  Hypothyroidism  - TSH 0.51    # Normocytic Anemia, anemia of chronic disease   - monitor H/H, keep Hb above 7.5     # Down Syndrome/  Cerebral Palsy/ functional quadriplegia   - supportive care  - prevent falls and aspiration   - failed speech and swallow, needs PEG as above  - on Raloxifene     Palliative follow up, patient is declining, if there is no improvement will change level of care to CMO ( change in level of care was approved by Consumer Advisory Board [place] [employer], paperwork in the paper chart)     #Progress Note Handoff  Pending (specify):  f/u CXR, ABG, EKG, MRSA nares (DC vanco if negative), repeat BCx, sputum culture if possible, aggressive pulmonary toilet, chest pt, aspiration precautions, c/w danna and add levaquin as per ID, palliative follow up to change the level of care ( see above) to CMO  Family discussion: n/a, spoke with an aid   Disposition: SDU Transfer from: 4a   Transfer to: SDU     58y old  Female who presents with a chief complaint of Respiratory Distress (04 Mar 2024 15:00)      HPI:  58F w/ PMHx Down Syndrome, nonverbal at baseline, Hypothyroidism, Cerebral palsy and Seizure Disorders presents to the ED from nursing facility/group home ([facility]) presented to ED for respiratory distress and high grade fever. Patient found to be septic on admission. As per isaias Savage at bedside, patient had been coughing and congested for 3x days prior to admission. Denies fevers, chills, n/v/d or pain prior to admission. Patient is on a puree diet at baseline.    Vitals: Temp 104.5F (rectal), /74, , RR 24, SpO2 100% on BiPAP  Labs: Hgb 9.5 (previously 11.1), Platelet 422, INR 1.43, VBG Lactate 2.1  Imaging: CXR shows possible RML infiltrate    In the ED:  - s/p Cefepime 2g IV x1  - s/p Levaquin 750mg IV x1  - s/p 2L LR bolus    Admitted for management of acute respiratory distress 2/2 CAP/Aspiration PNA (20 Jan 2024 18:22)    ----------------------------------------------------  FLOOR COURSE  Initially stable on the floor pending discharge once nutritional status was improved; patient requiring NGT feeding per SLP secondary to dysphagia, and failing FEES. Plan was to repeat SLP evaluation and if failing to consider PEG placement. Patient was received persistently tachycardic EKG NSR initially afebrile no evidence of DVT being treated with metoprolol, however on 1/31 spiked fever triggering septic work up, patient found congested, likely aspiration, required NRB/Highflow/Venti mask, started meropenem, upgraded to SDU for respiratory status monitoring.     STEPDOWN COURSE  Pt was made DNR/DNI ([facility]); oxygenation status went from HFNC--->NC, tx'ed with danna and caspofungin  plan is to complete caspo until 3/10 and if no improvement then change care to CMO as approved by consumer advisory board [place]  pt afebrile and stable for [facility]    FLOOR COURSE:   Patient completed course of danna and caspo through 3/10 as per ID recs. Remained on midodrine 20mg for hypotension. Continued on meropenem as no clinical improvement.     UPGRADE EVENT:   Received report from nurse that patient's O2 sat was 75% and not improving on 15L NRB. In addition, that patient's HR is 137 and rectal temp of 103.1. Patient was seen and examined at bedside, found to be stating on low 70s while on 15L NRB. Patient also appeared to be shivering. Mild wheezing appreciated on lung auscultation. Rectal Temp of 103.     Ddx: Mucus Plug v. Aspiration Pna v. PE (low suspicion given on therapeutic Lovenox)    Interventions:  - CXR: L side worsening  - EKG: Sinus Tach  - Tylenol  - Cooling blanket  - ABG: pO2 low (taken while on HFNC)  - HFNC + NRB    After above interventions, remained tachycardic, HR 130s; Placed on HFNC + NRB; BP soft, holding Lasix. Attempted to visualize IVC however due to positioning unable to view, trialed with 250cc bolus, patient responsive, given a total of 500cc of fluids.     ASSESSMENT/PLAN:   # Sepsis POA / Acute hypoxic resp failure / RSV bronchiolitis /  H/o Dysphagia/ suspected aspiration    - continue midodrine 20 Q8H  - completed caspo (end 3/10) per ID   - Failed FEES,  NG tube for feedings and medications, patient might  need PEG tube once 02 requirements decrease ( in case if level of care is still the same, consumer advisory board agreed for CMO ( paperwork in the the paper chart)   - c/w duoneb, chest PT, aspiration precautions   - pulmonary is following  - aggressive chest PT with vest, aspiration precautions and suction   - per ID, continue meropenem 1g q8h IV and add levaquin 750mg q24h  - f/u MRSA nares, dc vanco if negative   - f/u fungitell   - f/u BCx   - sputum culture if possible     # Conjunctivitis   - c/w erythromycin ointment both eyes     #  Elevated Troponin , type 2 MI/  Sinus tachycardia  - c/w telemetry monitoring   - Repeat EKG: Normal sinus rhythm  - c/w  metoprolol  - TTE 2/19 normal EF no DD or valve abnormalities    # Seizure Disorder  - c/w  Keppra   - seizure precautions  - keep Mg above 2.0     # H/o lower ext DVT   - c/w therapeutic Lovenox, Eliquis on dc    #  Hypothyroidism  - TSH 0.51    # Normocytic Anemia, anemia of chronic disease   - monitor H/H, keep Hb above 7.5     # Down Syndrome/  Cerebral Palsy/ functional quadriplegia   - supportive care  - prevent falls and aspiration   - failed speech and swallow, needs PEG as above  - on Raloxifene     Palliative follow up, patient is declining, if there is no improvement will change level of care to CMO ( change in level of care was approved by Consumer Advisory Board [place] [employer], paperwork in the paper chart)     #Progress Note Handoff  Pending (specify):  f/u MRSA nares (DC vanco if negative), repeat BCx, sputum culture if possible, aggressive pulmonary toilet, chest pt, aspiration precautions, c/w danna and add levaquin as per ID, palliative follow up to change the level of care ( see above) to CMO  Family discussion: n/a, spoke with an aid   Disposition: SDU

## 2024-03-14 NOTE — PROGRESS NOTE ADULT - SUBJECTIVE AND OBJECTIVE BOX
TEA ECHAVARRIA  58y  [facility] 4A 025 A      Patient is a 58y old  Female who presents with a chief complaint of Respiratory Distress (12 Mar 2024 16:58)      INTERVAL HPI/OVERNIGHT EVENTS:      pt is ill looking  not interactive. in distress on non-rebreather         FAMILY HISTORY:    T(C): 37.8 (03-13-24 @ 06:00), Max: 37.9 (03-12-24 @ 21:36)  HR: 114 (03-13-24 @ 06:00) (82 - 114)  BP: 116/64 (03-13-24 @ 06:00) (105/51 - 152/63)  RR: 18 (03-13-24 @ 06:15) (18 - 18)  SpO2: 94% (03-13-24 @ 06:15) (87% - 94%)  Wt(kg): --Vital Signs Last 24 Hrs  T(C): 37.8 (13 Mar 2024 06:00), Max: 37.9 (12 Mar 2024 21:36)  T(F): 100 (13 Mar 2024 06:00), Max: 100.2 (12 Mar 2024 21:36)  HR: 114 (13 Mar 2024 06:00) (82 - 114)  BP: 116/64 (13 Mar 2024 06:00) (105/51 - 152/63)  BP(mean): --  RR: 18 (13 Mar 2024 06:15) (18 - 18)  SpO2: 94% (13 Mar 2024 06:15) (87% - 94%)    Parameters below as of 13 Mar 2024 06:00  Patient On (Oxygen Delivery Method): nasal cannula        PHYSICAL EXAM:  GENERAL: non verbal in distresss   PULM: wheezing noted   CARDIAC: Regular rate and rhythm; No murmurs, rubs, or gallops  GI: Soft, Nontender, Nondistended; Bowel sounds present  EXTREMITIES:  2+ Peripheral Pulses,     Consultant(s) Notes Reviewed:  [x ] YES  [ ] NO  Care Discussed with Consultants/Other Providers [ x] YES  [ ] NO    LABS:                            8.7    13.97 )-----------( 622      ( 13 Mar 2024 08:35 )             28.6   03-13    138  |  97<L>  |  16  ----------------------------<  162<H>  4.4   |  30  |  0.6<L>    Ca    9.5      13 Mar 2024 08:35  Mg     2.5     03-13    TPro  7.6  /  Alb  3.4<L>  /  TBili  0.3  /  DBili  x   /  AST  17  /  ALT  13  /  AlkPhos  108  03-13            acetaminophen     Tablet .. 650 milliGRAM(s) Oral every 8 hours PRN  acetaminophen   IVPB .. 1000 milliGRAM(s) IV Intermittent once PRN  acetylcysteine 20%  Inhalation 4 milliLiter(s) Inhalation every 6 hours  albuterol/ipratropium for Nebulization 3 milliLiter(s) Nebulizer every 6 hours  aluminum hydroxide/magnesium hydroxide/simethicone Suspension 30 milliLiter(s) Oral every 4 hours PRN  AQUAPHOR (petrolatum Ointment) 1 Application(s) Topical two times a day  artificial  tears Solution 1 Drop(s) Both EYES two times a day  calcium carbonate   1250 mG (OsCal) 1 Tablet(s) Oral two times a day  chlorhexidine 2% Cloths 1 Application(s) Topical daily  chlorhexidine 2% Cloths 1 Application(s) Topical daily  enoxaparin Injectable 50 milliGRAM(s) SubCutaneous every 12 hours  erythromycin   Ointment 1 Application(s) Both EYES daily  gabapentin 300 milliGRAM(s) Oral every 12 hours  levETIRAcetam  IVPB 500 milliGRAM(s) IV Intermittent two times a day  melatonin 3 milliGRAM(s) Oral at bedtime  midodrine. 20 milliGRAM(s) Oral three times a day  ondansetron Injectable 4 milliGRAM(s) IV Push every 8 hours PRN  pantoprazole  Injectable 40 milliGRAM(s) IV Push every 24 hours  polyethylene glycol 3350 17 Gram(s) Oral at bedtime  raloxifene 60 milliGRAM(s) Oral daily  saccharomyces boulardii 250 milliGRAM(s) Oral two times a day  senna 2 Tablet(s) Oral at bedtime  sodium chloride 0.65% Nasal 2 Spray(s) Both Nostrils three times a day  sodium chloride 0.9% Bolus 500 milliLiter(s) IV Bolus once        58F w/ Down Syndrome/Cerebral Palsy/Intellectual Disability(Non-verbal at baseline), Seizure Disorder, DVT admitted for Sepsis w/ Acute Respiratory Failure w/ Hypoxia 2/2 Pneumonia w/ Abscess & RSV.      1. Acute hypoxic respiratory failure , on increasing O2 requirement secondary to pneumonia and RSV and Abscess persistent   - completed intravenous danna and caspofungin therapy duration as scheduled   - Re-started meropenem and vancomycin . add levofloxacin   - MRSA:pending  - Received lasix yest with no improvement   - Cont  Solumedrol 40mg IV daily   - Advanced Directive: approved DNI/DNR and Comfort Measures Option if no substantial improvement after abx completed on 3/10/24  - Patient remains with poor prognosis overall despite all care and long term survival not expected   - now on high flow   - CT chest:  a) Extensive patchy left-sided pulmonary opacities, slightly decreased from prior CT. Patchy right-sided opacities also demonstrated. Findings are likely infectious in etiology. Recommend follow-up   - ID consult appreciatred   - Palliative care consult appreciated     2. Known with DVT  - Now on lovenox     3. Down's Sd and seizure disorder on AED   - Cont home meds      4. Profound intellectual disability due to cerebral palsy   - Supportive care         TEA ECHAVARRIA  58y  [facility] 4A 025 A      Patient is a 58y old  Female who presents with a chief complaint of Respiratory Distress (12 Mar 2024 16:58)      INTERVAL HPI/OVERNIGHT EVENTS:      Pt is obtunded  not interactive  on high flow oxygen   aid at the bedside          FAMILY HISTORY:    T(C): 37.8 (03-13-24 @ 06:00), Max: 37.9 (03-12-24 @ 21:36)  HR: 114 (03-13-24 @ 06:00) (82 - 114)  BP: 116/64 (03-13-24 @ 06:00) (105/51 - 152/63)  RR: 18 (03-13-24 @ 06:15) (18 - 18)  SpO2: 94% (03-13-24 @ 06:15) (87% - 94%)  Wt(kg): --Vital Signs Last 24 Hrs  T(C): 37.8 (13 Mar 2024 06:00), Max: 37.9 (12 Mar 2024 21:36)  T(F): 100 (13 Mar 2024 06:00), Max: 100.2 (12 Mar 2024 21:36)  HR: 114 (13 Mar 2024 06:00) (82 - 114)  BP: 116/64 (13 Mar 2024 06:00) (105/51 - 152/63)  BP(mean): --  RR: 18 (13 Mar 2024 06:15) (18 - 18)  SpO2: 94% (13 Mar 2024 06:15) (87% - 94%)    Parameters below as of 13 Mar 2024 06:00  Patient On (Oxygen Delivery Method): nasal cannula        PHYSICAL EXAM:  GENERAL: non verbal obtunded   PULM: wheezing noted   CARDIAC: Regular rate and rhythm;  GI: Soft, Nontender, Nondistended; Bowel sounds present  EXTREMITIES:  2+ Peripheral Pulses,     Consultant(s) Notes Reviewed:  [x ] YES  [ ] NO  Care Discussed with Consultants/Other Providers [ x] YES  [ ] NO    LABS:                            8.7    13.97 )-----------( 622      ( 13 Mar 2024 08:35 )             28.6   03-13    138  |  97<L>  |  16  ----------------------------<  162<H>  4.4   |  30  |  0.6<L>    Ca    9.5      13 Mar 2024 08:35  Mg     2.5     03-13    TPro  7.6  /  Alb  3.4<L>  /  TBili  0.3  /  DBili  x   /  AST  17  /  ALT  13  /  AlkPhos  108  03-13            acetaminophen     Tablet .. 650 milliGRAM(s) Oral every 8 hours PRN  acetaminophen   IVPB .. 1000 milliGRAM(s) IV Intermittent once PRN  acetylcysteine 20%  Inhalation 4 milliLiter(s) Inhalation every 6 hours  albuterol/ipratropium for Nebulization 3 milliLiter(s) Nebulizer every 6 hours  aluminum hydroxide/magnesium hydroxide/simethicone Suspension 30 milliLiter(s) Oral every 4 hours PRN  AQUAPHOR (petrolatum Ointment) 1 Application(s) Topical two times a day  artificial  tears Solution 1 Drop(s) Both EYES two times a day  calcium carbonate   1250 mG (OsCal) 1 Tablet(s) Oral two times a day  chlorhexidine 2% Cloths 1 Application(s) Topical daily  chlorhexidine 2% Cloths 1 Application(s) Topical daily  enoxaparin Injectable 50 milliGRAM(s) SubCutaneous every 12 hours  erythromycin   Ointment 1 Application(s) Both EYES daily  gabapentin 300 milliGRAM(s) Oral every 12 hours  levETIRAcetam  IVPB 500 milliGRAM(s) IV Intermittent two times a day  melatonin 3 milliGRAM(s) Oral at bedtime  midodrine. 20 milliGRAM(s) Oral three times a day  ondansetron Injectable 4 milliGRAM(s) IV Push every 8 hours PRN  pantoprazole  Injectable 40 milliGRAM(s) IV Push every 24 hours  polyethylene glycol 3350 17 Gram(s) Oral at bedtime  raloxifene 60 milliGRAM(s) Oral daily  saccharomyces boulardii 250 milliGRAM(s) Oral two times a day  senna 2 Tablet(s) Oral at bedtime  sodium chloride 0.65% Nasal 2 Spray(s) Both Nostrils three times a day  sodium chloride 0.9% Bolus 500 milliLiter(s) IV Bolus once        58F w/ Down Syndrome/Cerebral Palsy/Intellectual Disability(Non-verbal at baseline), Seizure Disorder, DVT admitted for Sepsis w/ Acute Respiratory Failure w/ Hypoxia 2/2 Pneumonia w/ Abscess & RSV.      1. Acute hypoxic respiratory failure , on increasing O2 requirement secondary to pneumonia and RSV and Abscess persistent   - completed intravenous danna and caspofungin therapy duration as scheduled   - Re-started meropenem and vancomycin . add levofloxacin   - MRSA:pending  - Received lasix yest with no improvement   - Cont  Solumedrol 40mg IV daily   - Advanced Directive: approved DNI/DNR and Comfort Measures Option if no substantial improvement after abx completed on 3/10/24  - Patient remains with poor prognosis overall despite all care and long term survival not expected   - now on high flow   - CT chest:  a) Extensive patchy left-sided pulmonary opacities, slightly decreased from prior CT. Patchy right-sided opacities also demonstrated. Findings are likely infectious in etiology. Recommend follow-up   - ID consult appreciatred   - Palliative care consult appreciated     2. Known with DVT  - Now on lovenox     3. Down's Sd and seizure disorder on AED   - Cont home meds      4. Profound intellectual disability due to cerebral palsy   - Supportive care     Pt is critically ill. she is not improving despite all efforts.         TEA ECHAVARRIA  58y  [facility] 4A 025 A      Patient is a 58y old  Female who presents with a chief complaint of Respiratory Distress (12 Mar 2024 16:58)      INTERVAL HPI/OVERNIGHT EVENTS:      Pt is obtunded  not interactive  on high flow oxygen   aid at the bedside          FAMILY HISTORY:    T(C): 37.8 (03-13-24 @ 06:00), Max: 37.9 (03-12-24 @ 21:36)  HR: 114 (03-13-24 @ 06:00) (82 - 114)  BP: 116/64 (03-13-24 @ 06:00) (105/51 - 152/63)  RR: 18 (03-13-24 @ 06:15) (18 - 18)  SpO2: 94% (03-13-24 @ 06:15) (87% - 94%)  Wt(kg): --Vital Signs Last 24 Hrs  T(C): 37.8 (13 Mar 2024 06:00), Max: 37.9 (12 Mar 2024 21:36)  T(F): 100 (13 Mar 2024 06:00), Max: 100.2 (12 Mar 2024 21:36)  HR: 114 (13 Mar 2024 06:00) (82 - 114)  BP: 116/64 (13 Mar 2024 06:00) (105/51 - 152/63)  BP(mean): --  RR: 18 (13 Mar 2024 06:15) (18 - 18)  SpO2: 94% (13 Mar 2024 06:15) (87% - 94%)    Parameters below as of 13 Mar 2024 06:00  Patient On (Oxygen Delivery Method): nasal cannula        PHYSICAL EXAM:  GENERAL: non verbal obtunded   PULM: wheezing noted   CARDIAC: Regular rate and rhythm;  GI: Soft, Nontender, Nondistended; Bowel sounds present  EXTREMITIES:  2+ Peripheral Pulses,     Consultant(s) Notes Reviewed:  [x ] YES  [ ] NO  Care Discussed with Consultants/Other Providers [ x] YES  [ ] NO    LABS:                            8.7    13.97 )-----------( 622      ( 13 Mar 2024 08:35 )             28.6   03-13    138  |  97<L>  |  16  ----------------------------<  162<H>  4.4   |  30  |  0.6<L>    Ca    9.5      13 Mar 2024 08:35  Mg     2.5     03-13    TPro  7.6  /  Alb  3.4<L>  /  TBili  0.3  /  DBili  x   /  AST  17  /  ALT  13  /  AlkPhos  108  03-13            acetaminophen     Tablet .. 650 milliGRAM(s) Oral every 8 hours PRN  acetaminophen   IVPB .. 1000 milliGRAM(s) IV Intermittent once PRN  acetylcysteine 20%  Inhalation 4 milliLiter(s) Inhalation every 6 hours  albuterol/ipratropium for Nebulization 3 milliLiter(s) Nebulizer every 6 hours  aluminum hydroxide/magnesium hydroxide/simethicone Suspension 30 milliLiter(s) Oral every 4 hours PRN  AQUAPHOR (petrolatum Ointment) 1 Application(s) Topical two times a day  artificial  tears Solution 1 Drop(s) Both EYES two times a day  calcium carbonate   1250 mG (OsCal) 1 Tablet(s) Oral two times a day  chlorhexidine 2% Cloths 1 Application(s) Topical daily  chlorhexidine 2% Cloths 1 Application(s) Topical daily  enoxaparin Injectable 50 milliGRAM(s) SubCutaneous every 12 hours  erythromycin   Ointment 1 Application(s) Both EYES daily  gabapentin 300 milliGRAM(s) Oral every 12 hours  levETIRAcetam  IVPB 500 milliGRAM(s) IV Intermittent two times a day  melatonin 3 milliGRAM(s) Oral at bedtime  midodrine. 20 milliGRAM(s) Oral three times a day  ondansetron Injectable 4 milliGRAM(s) IV Push every 8 hours PRN  pantoprazole  Injectable 40 milliGRAM(s) IV Push every 24 hours  polyethylene glycol 3350 17 Gram(s) Oral at bedtime  raloxifene 60 milliGRAM(s) Oral daily  saccharomyces boulardii 250 milliGRAM(s) Oral two times a day  senna 2 Tablet(s) Oral at bedtime  sodium chloride 0.65% Nasal 2 Spray(s) Both Nostrils three times a day  sodium chloride 0.9% Bolus 500 milliLiter(s) IV Bolus once        58F w/ Down Syndrome/Cerebral Palsy/Intellectual Disability(Non-verbal at baseline), Seizure Disorder, DVT admitted for Sepsis w/ Acute Respiratory Failure w/ Hypoxia 2/2 Pneumonia w/ Abscess & RSV.      1. Acute hypoxic respiratory failure , on increasing O2 requirement secondary to pneumonia and RSV and Abscess persistent   - completed intravenous danna and caspofungin therapy duration as scheduled   - Re-started meropenem and vancomycin . add levofloxacin   - MRSA:pending  - Received lasix yest with no improvement   - Cont  Solumedrol 40mg IV daily   - Advanced Directive: approved DNI/DNR and Comfort Measures Option if no substantial improvement after abx completed on 3/10/24  - Patient remains with poor prognosis overall despite all care and long term survival not expected   - now on high flow   - CT chest:  a) Extensive patchy left-sided pulmonary opacities, slightly decreased from prior CT. Patchy right-sided opacities also demonstrated. Findings are likely infectious in etiology. Recommend follow-up   - ID consult appreciatred   - Palliative care consult appreciated     2. Known with DVT  - Now on lovenox     3. Down's Sd and seizure disorder on AED   - Cont home meds      4. Profound intellectual disability due to cerebral palsy   - Supportive care     5. Dysphagia still with NG tube     Pt is critically ill. she is not improving despite all efforts.   Pt was made DNI/DNR after consulting with consumer advisory board and 2 physician signature.   plan was if pt got worse after finishing caspogfungin to switch to CMO. Palliative care following. ID said it's hard to say it's end stage infection yet.

## 2024-03-14 NOTE — DISCHARGE NOTE NURSING/CASE MANAGEMENT/SOCIAL WORK - PATIENT PORTAL LINK FT
You can access the FollowMyHealth Patient Portal offered by [facility] by registering at the following website: [email or web address]. By joining [facility]’s FollowMyHealth portal, you will also be able to view your health information using other applications (apps) compatible with our system.

## 2024-03-14 NOTE — PROGRESS NOTE ADULT - ASSESSMENT
ASSESSMENT  58F w/ PMHx Down Syndrome, nonverbal at baseline, Hypothyroidism, Cerebral palsy and Seizure Disorders presents to the ED from nursing facility/group home presented to ED for respiratory distress and high grade fever.     IMPRESSION  #Hypoxemia    3/13 CT Chest Extensive patchy  left-sided pulmonary opacities, slightly decreased from   prior CT. Patchy right-sided opacities also demonstrated. Findings are   likely infectious in etiology. Recommend follow-up.    CXR 3/13 Bilateral interstitial densities may reflect vascular congestion.  #HAP / aspiration with cavitary PNA  < from: CT Chest w/ IV Cont (02.21.24 @ 00:09) >  Bilateral pneumonia, left worse than right, with a left upper lobe   thick-walled cavity. Diffuse opacification of the   left lung is seen with what appears to be some mucus plugging centrally   within the left mainstem bronchus. There is a pleural effusion.   Reactive mediastinal   lymph nodes are seen.    MRSA PCR Result.: Negative (02-20-24 @ 13:42)    Procalcitonin, Serum: 0.16 (02-19-24 @ 16:00)- unremarkable     2/17 BCX NGTD     Rapid RVP Result: NotDetec (02-17-24 @ 19:06)    2/12 BCX NGTD    Procalcitonin, Serum: 0.10 (02-12-24 @ 11:17)    Rapid RVP Result: NotDetec (02-12-24 @ 10:28)    MRSA PCR Result.: Negative (02-12-24 @ 10:28)    2/9 BCX NGTD x2    2/3 BCX NGTD     2/1 BCX NGTD     2/1 UCX   >=3 organisms. Probable collection contamination.    1/31 BCX NG    Rapid RVP Result: NotDetec (02-04-24 @ 10:28)    MRSA PCR Result.: Negative (02-03-24 @ 21:32)     UA without significant pyuria   Procalcitonin, Serum: 0.22 (02-01-24 @ 16:00)--> Procalcitonin, Serum: 0.15 (02-03-24 @ 11:13), downtrending ; unremarkable   MRSA PCR Result.: Negative (01-22-24 @ 05:30)  < from: Xray Chest 1 View-PORTABLE IMMEDIATE (Xray Chest 1 View-PORTABLE IMMEDIATE .) (02.01.24 @ 03:02) >  Bibasal opacities without significant change.    quantiferon gold negative  #Acute hypoxic respiratory failure- Gram Negative pneumonia   #1/20 BCX 1/4 bottles : Staphylococcus hominis- contaminant   Repeat CX NG   #Severe Sepsis on admission  #RSV + 1/21  #Elevated fungitell   serum aspergillus galactomannan and histo are (-), not at risk of PCP  Fungitell: 76 (02-19-24 @ 16:00)  Fungitell: 63 (02-06-24 @ 11:27)  Fungitell: 325 (01-20-24 @ 21:23)  Fungitell: 138 (11-07-23 @ 08:08)  Fungitell: 115 (11-02-23 @ 11:40)  Fungitell: >500 pg/mL (10.17.23 @ 17:20) s/p empiric caspo   #Full thickness ulcer right heel- Appreciate burn/podiatry evaluation.  #History of Right planter foot ulcers - two full thickness ulcers - serous drainage with mild erythema with OM  - MR Foot No Cont, Right (10.16.23 @ 21:51): IMPRESSION: 1.  Limited exam. 2.  Osteomyelitis of the first metatarsal stump. 3.  Osteomyelitis of the second toe distal phalanx.  - s/p excidional debridement to and including bone 1st metatarsal with partial 2nd digit amputation - 1st metatarsal head resected - Wound Cx Proteus ESBL   #CKD 2-3 Creatinine: 0.7 mg/dL (02.02.24 @ 04:59)  #History of buttock ulcer  #Down syndrome/Cerebral Palsy     RECOMMENDATIONS  - BCX  - expanded RVP   - midline 2/26  - Send MRSA nares, if negative D/C VANC - Vanc dosing AUC/ZANE per clinical pharmacist   - Send fungitell   - Sputum cx if possible  - Continue Meropenem 1g q8h IV  - ADD Levaquin 750mg q24h IV , monitor QTC   - Doubt fungal in nature , fungal workup previously negative, f/u fungitell   - Grave prognosis, aggressive care is futile    If any questions, please send a message or call on [facility] Teams  Please continue to update ID with any pertinent new laboratory or radiographic findings.

## 2024-03-14 NOTE — PROGRESS NOTE ADULT - SUBJECTIVE AND OBJECTIVE BOX
HPI: 58F with Down syndrome, nonverbal at baseline, hypothyroidism, CP, and seizure disorder here from [facility] with fever and respiratory distress. Found to be septic on admission, from CAP/aspiration PNA, on IV vanco and meropenem, with course c/b continued fevers, and bacteremia with S. hominis. Also failed FEES, has NGT in place and will likely need PEG. Is requiring HFNC alternating with BiPAP. Is also on midodrine for hypotension. Patient is full code. Of note patient is under [facility]. Palliative care consulted for [facility].    INTERVAL EVENTS  2/29: patient appears comfortable overall, group home staff at bedside  3/1: patient appears more comfortable today, no objective signs of pain or discomfort  3/4: patient appears comfortable  3/13: patient with increasing O2 requirement today, restarted on IV abx  3/14: patient on HFNC, appears comfortable    ADVANCE DIRECTIVES:    [ ] Full Code [X ] DNR  MOLST  [ ]  Living Will  [ ]   DECISION MAKER(s):  [ ] Health Care Proxy(s)  [ ] Surrogate(s)  [ ] Guardian           Name(s): Phone Number(s): [facility]    BASELINE (I)ADL(s) (prior to admission):  [place]: [ ]Total  [ ] Moderate [ ]Dependent  Palliative Performance Status Version 2:         %    http://npcrc.org/files/news/palliative_performance_scale_ppsv2.pdf    Allergies    No Known Allergies    Intolerances    MEDICATIONS  (STANDING):  acetylcysteine 20%  Inhalation 4 milliLiter(s) Inhalation every 6 hours  albuterol/ipratropium for Nebulization 3 milliLiter(s) Nebulizer every 6 hours  AQUAPHOR (petrolatum Ointment) 1 Application(s) Topical two times a day  artificial  tears Solution 1 Drop(s) Both EYES two times a day  calcium carbonate   1250 mG (OsCal) 1 Tablet(s) Oral two times a day  chlorhexidine 2% Cloths 1 Application(s) Topical daily  chlorhexidine 2% Cloths 1 Application(s) Topical daily  enoxaparin Injectable 50 milliGRAM(s) SubCutaneous every 12 hours  erythromycin   Ointment 1 Application(s) Both EYES daily  gabapentin 300 milliGRAM(s) Oral every 12 hours  levETIRAcetam  IVPB 500 milliGRAM(s) IV Intermittent two times a day  levoFLOXacin IVPB      melatonin 3 milliGRAM(s) Oral at bedtime  meropenem  IVPB 1000 milliGRAM(s) IV Intermittent every 8 hours  methylPREDNISolone sodium succinate Injectable 40 milliGRAM(s) IV Push daily  midodrine. 20 milliGRAM(s) Oral three times a day  pantoprazole  Injectable 40 milliGRAM(s) IV Push every 24 hours  polyethylene glycol 3350 17 Gram(s) Oral at bedtime  raloxifene 60 milliGRAM(s) Oral daily  saccharomyces boulardii 250 milliGRAM(s) Oral two times a day  senna 2 Tablet(s) Oral at bedtime  sodium chloride 0.65% Nasal 2 Spray(s) Both Nostrils three times a day  sodium chloride 0.9% Bolus 500 milliLiter(s) IV Bolus once  vancomycin  IVPB 750 milliGRAM(s) IV Intermittent every 12 hours    MEDICATIONS  (PRN):  acetaminophen     Tablet .. 650 milliGRAM(s) Oral every 8 hours PRN Temp greater or equal to 38.5C (101.3F)  aluminum hydroxide/magnesium hydroxide/simethicone Suspension 30 milliLiter(s) Oral every 4 hours PRN Dyspepsia  ondansetron Injectable 4 milliGRAM(s) IV Push every 8 hours PRN Nausea and/or Vomiting      PRESENT SYMPTOMS: [X ]Unable to obtain due to poor mentation   Source if other than patient:  [ ]Family   [ ]Team     Pain: [ ]yes [ ]no  QOL impact -   Location -                    Aggravating factors -  Quality -  Radiation -  Timing-  Severity (0-10 scale):  Minimal acceptable level (0-10 scale):     CPOT:    https://www.sccm.org/getattachment/lgf50q66-5r0j-6g4g-2u0b-7344p4308q0k/Critical-Care-Pain-Observation-Tool-(CPOT)    PAIN AD Score: 0  http://geriatrictoolkit.[place].[email or web address]/painad.pdf (press ctrl +  left click to view)    Dyspnea:                           [ ]None[ ]Mild [ ]Moderate [ ]Severe     Respiratory Distress Observation Scale (RDOS): 1  A score of 0 to 2 signifies little or no respiratory distress, 3 signifies mild distress, scores 4 to 6 indicate moderate distress, and scores greater than 7 signify severe distress  [email or web address]://[email or web address]/sites/default/files/PDFS/192216-zybvrlgvcyj-sresupes-cdaxpxeomqc-xmulc.pdf    Anxiety:                             [ ]None[ ]Mild [ ]Moderate [ ]Severe   Fatigue:                             [ ]None[ ]Mild [ ]Moderate [ ]Severe   Nausea:                             [ ]None[ ]Mild [ ]Moderate [ ]Severe   Loss of appetite:              [ ]None[ ]Mild [ ]Moderate [ ]Severe   Constipation:                    [ ]None[ ]Mild [ ]Moderate [ ]Severe    Other Symptoms:  [ ]All other review of systems negative     Palliative Performance Status Version 2:         %    http://npcrc.org/files/news/palliative_performance_scale_ppsv2.pdf  PHYSICAL EXAM:  Vital Signs Last 24 Hrs  T(C): 37 (14 Mar 2024 13:37), Max: 39.8 (14 Mar 2024 03:40)  T(F): 98.6 (14 Mar 2024 13:37), Max: 103.6 (14 Mar 2024 03:40)  HR: 120 (14 Mar 2024 13:37) (92 - 136)  BP: 102/61 (14 Mar 2024 13:37) (97/51 - 113/64)  BP(mean): --  RR: 18 (14 Mar 2024 13:37) (18 - 20)  SpO2: 93% (14 Mar 2024 07:23) (93% - 98%)    Parameters below as of 14 Mar 2024 07:23  Patient On (Oxygen Delivery Method): high flow          GENERAL:  [X ] No acute distress [ ]Lethargic  [ ]Unarousable  [ ]Verbal  [ ]Non-Verbal [ ]Cachexia    BEHAVIORAL/PSYCH:  [ ]Alert and Oriented x  [ ] Anxiety [ ] Delirium [ ] Agitation [X ] Calm   EYES: [ X] No scleral icterus [ ] Scleral icterus [ ] Closed  ENMT:  [ ]Dry mouth  [ ]No external oral lesions [ X] No external ear or nose lesions  CARDIOVASCULAR:  [ ]Regular [ ]Irregular [ ]Tachy [ ]Not Tachy  [ ]Raheem [ ] Edema [ ] No edema  PULMONARY:  [ ]Tachypnea  [ ]Audible excessive secretions [X ] No labored breathing [ ] labored breathing  GASTROINTESTINAL: [ ]Soft  [ ]Distended  [ X]Not distended [ ]Non tender [ ]Tender  MUSCULOSKELETAL: [ ]No clubbing [ ] clubbing  [ X] No cyanosis [ ] cyanosis  NEUROLOGIC: [ ]No focal deficits  [ ]Follows commands  [ ]Does not follow commands  [X ]Cognitive impairment  [ ]Dysphagia  [ ]Dysarthria  [ ]Paresis   SKIN: [ ] Jaundiced [X ] Non-jaundiced [ ]Rash [ ]No Rash [ ] Warm [ ] Dry  MISC/LINES: [ ] ET tube [ ] Trach [ ]NGT/OGT [ ]PEG [ ]M[place]en    CRITICAL CARE:  [ ] Shock Present  [ ]Septic [ ]Cardiogenic [ ]Neurologic [ ]Hypovolemic  [ ]  Vasopressors [ ]  Inotropes   [ ]Respiratory failure present [ ]Mechanical ventilation [ ]Non-invasive ventilatory support [ ]High flow  [ ]Acute  [ ]Chronic [ ]Hypoxic  [ ]Hypercarbic [ ]Other  [ ]Other organ failure     LABS: reviewed by me                          7.7    20.73 )-----------( 514      ( 14 Mar 2024 08:21 )             25.1     03-14    134<L>  |  95<L>  |  15  ----------------------------<  201<H>  3.7   |  30  |  0.6<L>    Ca    9.3      14 Mar 2024 08:21  Mg     2.2     03-14        RADIOLOGY & ADDITIONAL STUDIES: reviewed by m    CXR 2/5/24    Support devices: Enteric tube satisfactory position.    Cardiac/ Mediastinum: unremarkable    Lungs/ Pleura: Diminishing left lung opacity/effusion. Stable smaller   right basilar opacity. No pneumothorax.    Skeletal/ soft tissues: Stable    PROTEIN CALORIE MALNUTRITION PRESENT: [ ]mild [ ]moderate [ ]severe [ ]underweight [ ]morbid obesity  https://www.andeal.org/vault/2440/web/files/ONC/Table_Clinical%20Characteristics%20to%20Document%20Malnutrition-White%20JV%20et%20al%202012.pdf    Height (cm): 136.4 (01-24-24 @ 09:52), 154.9 (11-17-23 @ 15:00), 145 (10-02-23 @ 12:00)  Weight (kg): 52.3 (01-21-24 @ 08:00), 60 (11-17-23 @ 15:00), 55.3 (10-02-23 @ 12:00)  BMI (kg/m2): 28.1 (01-24-24 @ 09:52), 21.8 (01-21-24 @ 08:00), 25 (11-17-23 @ 15:00)    [ ]PPSV2 < or = to 30% [ ]significant weight loss  [ ]poor nutritional intake  [ ]anasarca      [ ]Artificial Nutrition      Palliative Care Spiritual/Emotional Screening Tool Question  Severity (0-4):                    OR                    [X ] Unable to determine/NA  Score of 2 or greater indicates recommendation of Chaplaincy referral  Chaplaincy Referral: [ ] Yes [ ] Refused [ ] Following     Caregiver [place]:  [ ] Yes [ ] No    OR    [x ] Unable to determine. Will assess at later time if appropriate.  Social Work Referral [ ]  Patient and Family Centered Care Referral [ ]    Anticipatory Grief Present: [ ] Yes [ ] No    OR     [ x] Unable to determine. Will assess at later time if appropriate.  Social Work Referral [ ]  Patient and Family Centered Care Referral [ ]    REFERRALS:   [ ]Chaplaincy  [ ]Hospice  [ ]Child Life  [ ]Social Work  [ ]Case management [ ]Holistic Therapy     Palliative care education provided to patient and/or family    Goals of Care Document:     ______________  Wu Jenkins MD  Palliative Medicine  [facility]   of Geriatric and Palliative Medicine  [phone]

## 2024-03-15 LAB
-  STAPHYLOCOCCUS EPIDERMIDIS, METHICILLIN RESISTANT: SIGNIFICANT CHANGE UP
BASOPHILS # BLD AUTO: 0.02 K/UL — SIGNIFICANT CHANGE UP (ref 0–0.2)
BASOPHILS NFR BLD AUTO: 0.1 % — SIGNIFICANT CHANGE UP (ref 0–1)
CULTURE RESULTS: ABNORMAL
EOSINOPHIL # BLD AUTO: 0 K/UL — SIGNIFICANT CHANGE UP (ref 0–0.7)
EOSINOPHIL NFR BLD AUTO: 0 % — SIGNIFICANT CHANGE UP (ref 0–8)
GLUCOSE BLDC GLUCOMTR-MCNC: 119 MG/DL — HIGH (ref 70–99)
GRAM STN FLD: ABNORMAL
HCT VFR BLD CALC: 26.3 % — LOW (ref 37–47)
HGB BLD-MCNC: 7.8 G/DL — LOW (ref 12–16)
IMM GRANULOCYTES NFR BLD AUTO: 0.6 % — HIGH (ref 0.1–0.3)
LYMPHOCYTES # BLD AUTO: 1.2 K/UL — SIGNIFICANT CHANGE UP (ref 1.2–3.4)
LYMPHOCYTES # BLD AUTO: 7.4 % — LOW (ref 20.5–51.1)
MCHC RBC-ENTMCNC: 23.2 PG — LOW (ref 27–31)
MCHC RBC-ENTMCNC: 29.7 G/DL — LOW (ref 32–37)
MCV RBC AUTO: 78.3 FL — LOW (ref 81–99)
METHOD TYPE: SIGNIFICANT CHANGE UP
MONOCYTES # BLD AUTO: 0.11 K/UL — SIGNIFICANT CHANGE UP (ref 0.1–0.6)
MONOCYTES NFR BLD AUTO: 0.7 % — LOW (ref 1.7–9.3)
NEUTROPHILS # BLD AUTO: 14.73 K/UL — HIGH (ref 1.4–6.5)
NEUTROPHILS NFR BLD AUTO: 91.2 % — HIGH (ref 42.2–75.2)
NRBC # BLD: 0 /100 WBCS — SIGNIFICANT CHANGE UP (ref 0–0)
ORGANISM # SPEC MICROSCOPIC CNT: ABNORMAL
ORGANISM # SPEC MICROSCOPIC CNT: SIGNIFICANT CHANGE UP
PLATELET # BLD AUTO: 526 K/UL — HIGH (ref 130–400)
PMV BLD: 10.2 FL — SIGNIFICANT CHANGE UP (ref 7.4–10.4)
RBC # BLD: 3.36 M/UL — LOW (ref 4.2–5.4)
RBC # FLD: 19.7 % — HIGH (ref 11.5–14.5)
SPECIMEN SOURCE: SIGNIFICANT CHANGE UP
WBC # BLD: 16.16 K/UL — HIGH (ref 4.8–10.8)
WBC # FLD AUTO: 16.16 K/UL — HIGH (ref 4.8–10.8)

## 2024-03-15 PROCEDURE — 99233 SBSQ HOSP IP/OBS HIGH 50: CPT

## 2024-03-15 RX ADMIN — Medication 40 MILLIGRAM(S): at 05:20

## 2024-03-15 RX ADMIN — Medication 4 MILLILITER(S): at 09:38

## 2024-03-15 RX ADMIN — LEVETIRACETAM 400 MILLIGRAM(S): 250 TABLET, FILM COATED ORAL at 05:16

## 2024-03-15 RX ADMIN — Medication 250 MILLIGRAM(S): at 05:21

## 2024-03-15 RX ADMIN — Medication 1 APPLICATION(S): at 13:13

## 2024-03-15 RX ADMIN — MEROPENEM 100 MILLIGRAM(S): 1 INJECTION INTRAVENOUS at 20:14

## 2024-03-15 RX ADMIN — GABAPENTIN 300 MILLIGRAM(S): 400 CAPSULE ORAL at 05:20

## 2024-03-15 RX ADMIN — Medication 3 MILLILITER(S): at 20:00

## 2024-03-15 RX ADMIN — Medication 3 MILLIGRAM(S): at 21:59

## 2024-03-15 RX ADMIN — Medication 1 TABLET(S): at 18:50

## 2024-03-15 RX ADMIN — Medication 1 TABLET(S): at 05:20

## 2024-03-15 RX ADMIN — Medication 2 SPRAY(S): at 05:16

## 2024-03-15 RX ADMIN — POLYETHYLENE GLYCOL 3350 17 GRAM(S): 17 POWDER, FOR SOLUTION ORAL at 21:58

## 2024-03-15 RX ADMIN — Medication 4 MILLILITER(S): at 20:01

## 2024-03-15 RX ADMIN — Medication 1 DROP(S): at 05:33

## 2024-03-15 RX ADMIN — Medication 250 MILLIGRAM(S): at 18:53

## 2024-03-15 RX ADMIN — PANTOPRAZOLE SODIUM 40 MILLIGRAM(S): 20 TABLET, DELAYED RELEASE ORAL at 05:35

## 2024-03-15 RX ADMIN — LEVETIRACETAM 400 MILLIGRAM(S): 250 TABLET, FILM COATED ORAL at 18:53

## 2024-03-15 RX ADMIN — Medication 3 MILLILITER(S): at 09:37

## 2024-03-15 RX ADMIN — Medication 1 APPLICATION(S): at 18:52

## 2024-03-15 RX ADMIN — ENOXAPARIN SODIUM 50 MILLIGRAM(S): 100 INJECTION SUBCUTANEOUS at 18:50

## 2024-03-15 RX ADMIN — MIDODRINE HYDROCHLORIDE 20 MILLIGRAM(S): 2.5 TABLET ORAL at 13:14

## 2024-03-15 RX ADMIN — Medication 4 MILLILITER(S): at 14:29

## 2024-03-15 RX ADMIN — Medication 250 MILLIGRAM(S): at 05:35

## 2024-03-15 RX ADMIN — CHLORHEXIDINE GLUCONATE 1 APPLICATION(S): 213 SOLUTION TOPICAL at 13:13

## 2024-03-15 RX ADMIN — MEROPENEM 100 MILLIGRAM(S): 1 INJECTION INTRAVENOUS at 13:12

## 2024-03-15 RX ADMIN — CHLORHEXIDINE GLUCONATE 1 APPLICATION(S): 213 SOLUTION TOPICAL at 13:12

## 2024-03-15 RX ADMIN — GABAPENTIN 300 MILLIGRAM(S): 400 CAPSULE ORAL at 18:51

## 2024-03-15 RX ADMIN — Medication 250 MILLIGRAM(S): at 18:52

## 2024-03-15 RX ADMIN — Medication 3 MILLILITER(S): at 14:29

## 2024-03-15 RX ADMIN — SENNA PLUS 2 TABLET(S): 8.6 TABLET ORAL at 21:58

## 2024-03-15 RX ADMIN — Medication 1 APPLICATION(S): at 05:17

## 2024-03-15 RX ADMIN — MIDODRINE HYDROCHLORIDE 20 MILLIGRAM(S): 2.5 TABLET ORAL at 18:51

## 2024-03-15 RX ADMIN — ENOXAPARIN SODIUM 50 MILLIGRAM(S): 100 INJECTION SUBCUTANEOUS at 05:20

## 2024-03-15 RX ADMIN — RALOXIFENE HYDROCHLORIDE 60 MILLIGRAM(S): 60 TABLET, COATED ORAL at 13:14

## 2024-03-15 RX ADMIN — Medication 2 SPRAY(S): at 22:00

## 2024-03-15 RX ADMIN — MEROPENEM 100 MILLIGRAM(S): 1 INJECTION INTRAVENOUS at 05:16

## 2024-03-15 RX ADMIN — Medication 2 SPRAY(S): at 13:16

## 2024-03-15 RX ADMIN — Medication 1 DROP(S): at 18:49

## 2024-03-15 RX ADMIN — MIDODRINE HYDROCHLORIDE 20 MILLIGRAM(S): 2.5 TABLET ORAL at 05:21

## 2024-03-15 NOTE — PROGRESS NOTE ADULT - ASSESSMENT
# Sepsis POA / Acute hypoxic resp failure / RSV bronchiolitis /  H/o Dysphagia/ suspected aspiration    - continue midodrine 20 Q8H  - completed caspo (end 3/10) per ID   - Failed FEES,  NG tube for feedings and medications, patient might  need PEG tube once 02 requirements decrease ( in case if level of care is still the same, consumer advisory board agreed for CMO ( paperwork in the the paper chart)   - c/w duoneb, chest PT, aspiration precautions   - pulmonary is following  - aggressive chest PT with vest, aspiration precautions and suction   - per ID, continue meropenem 1g q8h IV and add levaquin 750mg q24h  - f/u MRSA nares, dc vanco if negative   - f/u fungitell   - f/u BCx   - sputum culture if possible     # Conjunctivitis   - c/w erythromycin ointment both eyes     #  Elevated Troponin , type 2 MI/  Sinus tachycardia  - c/w telemetry monitoring   - Repeat EKG: Normal sinus rhythm  - c/w  metoprolol  - TTE 2/19 normal EF no DD or valve abnormalities    # Seizure Disorder  - c/w  Keppra   - seizure precautions  - keep Mg above 2.0     # H/o lower ext DVT   - c/w therapeutic Lovenox, Eliquis on dc    #  Hypothyroidism  - TSH 0.51    # Normocytic Anemia, anemia of chronic disease   - monitor H/H, keep Hb above 7.5     # Down Syndrome/  Cerebral Palsy/ functional quadriplegia   - supportive care  - prevent falls and aspiration   - failed speech and swallow, needs PEG as above  - on Raloxifene     Palliative follow up, patient is declining, if there is no improvement will change level of care to CMO ( change in level of care was approved by Consumer Advisory Board [place] [employer], paperwork in the paper chart)     #Progress Note Handoff  Pending (specify):  f/u MRSA nares (DC vanco if negative), repeat BCx, sputum culture if possible, aggressive pulmonary toilet, chest pt, aspiration precautions, c/w danna and add levaquin as per ID, palliative follow up to change the level of care ( see above) to CMO  Family discussion: n/a, spoke with an aid   Disposition: SDU.

## 2024-03-15 NOTE — PROGRESS NOTE ADULT - SUBJECTIVE AND OBJECTIVE BOX
Patient is a 58y old  Female who presents with a chief complaint of Respiratory Distress (14 Mar 2024 14:05)        Over Night Events: event noted        ROS:     All ROS are negative except HPI         PHYSICAL EXAM    ICU Vital Signs Last 24 Hrs  T(C): 36.8 (15 Mar 2024 04:00), Max: 37.9 (14 Mar 2024 17:33)  T(F): 98.3 (15 Mar 2024 04:00), Max: 100.2 (14 Mar 2024 17:33)  HR: 105 (15 Mar 2024 04:00) (98 - 120)  BP: 111/63 (15 Mar 2024 04:00) (87/53 - 111/63)  BP(mean): 81 (15 Mar 2024 04:00) (70 - 81)  ABP: --  ABP(mean): --  RR: 18 (15 Mar 2024 04:00) (18 - 20)  SpO2: 94% (15 Mar 2024 04:00) (92% - 100%)    O2 Parameters below as of 14 Mar 2024 21:30  Patient On (Oxygen Delivery Method): nasal cannula, high flow              ENT: Airway patent,  EYES: Pupils equal, Round and reactive to light.  CARDIAC: Normal rate, Regular rhythm.    RESPIRATORY: No wheezing, Bilateral crackles, Not tachypneic, No use of accessory muscles  GASTROINTESTINAL: Abdomen soft, Non-tender, No guarding,   NEUROLOGICAL: Non arousable.   SKIN: Skin normal color for race, Warm and dry and intact.           LABS:                            7.7    20.73 )-----------( 514      ( 14 Mar 2024 08:21 )             25.1                                               03-14    134<L>  |  95<L>  |  15  ----------------------------<  201<H>  3.7   |  30  |  0.6<L>    Ca    9.3      14 Mar 2024 08:21  Mg     2.2     03-14    TPro  7.0  /  Alb  3.0<L>  /  TBili  0.5  /  DBili  x   /  AST  17  /  ALT  13  /  AlkPhos  95  03-14                                             Urinalysis Basic - ( 14 Mar 2024 08:21 )    Color: x / Appearance: x / SG: x / pH: x  Gluc: 201 mg/dL / Ketone: x  / Bili: x / Urobili: x   Blood: x / Protein: x / Nitrite: x   Leuk Esterase: x / RBC: x / WBC x   Sq Epi: x / Non Sq Epi: x / Bacteria: x                                                  LIVER FUNCTIONS - ( 14 Mar 2024 08:21 )  Alb: 3.0 g/dL / Pro: 7.0 g/dL / ALK PHOS: 95 U/L / ALT: 13 U/L / AST: 17 U/L / GGT: x                                                  Culture - Blood (collected 13 Mar 2024 10:00)  Source: .Blood Blood  Gram Stain (15 Mar 2024 02:17):    Growth in anaerobic bottle: Gram Positive Cocci in Clusters  Preliminary Report (15 Mar 2024 02:18):    Growth in anaerobic bottle: Gram Positive Cocci in Clusters    Direct identification is available within approximately 3-5    hours either by Blood Panel Multiplexed PCR or Direct    MALDI-TOF. Details: [email or web address]  Organism: Blood Culture PCR (15 Mar 2024 03:53)  Organism: Blood Culture PCR (15 Mar 2024 03:53)                                                                                       ABG - ( 13 Mar 2024 22:56 )  pH, Arterial: 7.48  pH, Blood: x     /  pCO2: 45    /  pO2: 68    / HCO3: 34    / Base Excess: 9.0   /  SaO2: 94.9                MEDICATIONS  (STANDING):  acetylcysteine 20%  Inhalation 4 milliLiter(s) Inhalation every 6 hours  albuterol/ipratropium for Nebulization 3 milliLiter(s) Nebulizer every 6 hours  AQUAPHOR (petrolatum Ointment) 1 Application(s) Topical two times a day  artificial  tears Solution 1 Drop(s) Both EYES two times a day  calcium carbonate   1250 mG (OsCal) 1 Tablet(s) Oral two times a day  chlorhexidine 2% Cloths 1 Application(s) Topical daily  chlorhexidine 2% Cloths 1 Application(s) Topical daily  enoxaparin Injectable 50 milliGRAM(s) SubCutaneous every 12 hours  erythromycin   Ointment 1 Application(s) Both EYES daily  gabapentin 300 milliGRAM(s) Oral every 12 hours  levETIRAcetam  IVPB 500 milliGRAM(s) IV Intermittent two times a day  levoFLOXacin IVPB 750 milliGRAM(s) IV Intermittent every 24 hours  levoFLOXacin IVPB      melatonin 3 milliGRAM(s) Oral at bedtime  meropenem  IVPB 1000 milliGRAM(s) IV Intermittent every 8 hours  methylPREDNISolone sodium succinate Injectable 40 milliGRAM(s) IV Push daily  midodrine. 20 milliGRAM(s) Oral three times a day  pantoprazole  Injectable 40 milliGRAM(s) IV Push every 24 hours  polyethylene glycol 3350 17 Gram(s) Oral at bedtime  raloxifene 60 milliGRAM(s) Oral daily  saccharomyces boulardii 250 milliGRAM(s) Oral two times a day  senna 2 Tablet(s) Oral at bedtime  sodium chloride 0.65% Nasal 2 Spray(s) Both Nostrils three times a day  sodium chloride 0.9% Bolus 500 milliLiter(s) IV Bolus once  vancomycin  IVPB 750 milliGRAM(s) IV Intermittent every 12 hours    MEDICATIONS  (PRN):  acetaminophen     Tablet .. 650 milliGRAM(s) Oral every 8 hours PRN Temp greater or equal to 38.5C (101.3F)  aluminum hydroxide/magnesium hydroxide/simethicone Suspension 30 milliLiter(s) Oral every 4 hours PRN Dyspepsia  ondansetron Injectable 4 milliGRAM(s) IV Push every 8 hours PRN Nausea and/or Vomiting         Patient is a 58y old  Female who presents with a chief complaint of Respiratory Distress (14 Mar 2024 14:05)        Over Night Events: event noted. HF 40/40        ROS:     All ROS are negative except HPI         PHYSICAL EXAM    ICU Vital Signs Last 24 Hrs  T(C): 36.8 (15 Mar 2024 04:00), Max: 37.9 (14 Mar 2024 17:33)  T(F): 98.3 (15 Mar 2024 04:00), Max: 100.2 (14 Mar 2024 17:33)  HR: 105 (15 Mar 2024 04:00) (98 - 120)  BP: 111/63 (15 Mar 2024 04:00) (87/53 - 111/63)  BP(mean): 81 (15 Mar 2024 04:00) (70 - 81)  ABP: --  ABP(mean): --  RR: 18 (15 Mar 2024 04:00) (18 - 20)  SpO2: 94% (15 Mar 2024 04:00) (92% - 100%)    O2 Parameters below as of 14 Mar 2024 21:30  Patient On (Oxygen Delivery Method): nasal cannula, high flow            ENT: Airway patent,  EYES: Pupils equal, Round and reactive to light.  CARDIAC: Normal rate, Regular rhythm.    RESPIRATORY: No wheezing, Bilateral crackles, Not tachypneic, No use of accessory muscles  GASTROINTESTINAL: Abdomen soft, Non-tender, No guarding,   NEUROLOGICAL: Non arousable.   SKIN: Skin normal color for race, Warm and dry and intact.           LABS:                            7.7    20.73 )-----------( 514      ( 14 Mar 2024 08:21 )             25.1                                               03-14    134<L>  |  95<L>  |  15  ----------------------------<  201<H>  3.7   |  30  |  0.6<L>    Ca    9.3      14 Mar 2024 08:21  Mg     2.2     03-14    TPro  7.0  /  Alb  3.0<L>  /  TBili  0.5  /  DBili  x   /  AST  17  /  ALT  13  /  AlkPhos  95  03-14                                             Urinalysis Basic - ( 14 Mar 2024 08:21 )    Color: x / Appearance: x / SG: x / pH: x  Gluc: 201 mg/dL / Ketone: x  / Bili: x / Urobili: x   Blood: x / Protein: x / Nitrite: x   Leuk Esterase: x / RBC: x / WBC x   Sq Epi: x / Non Sq Epi: x / Bacteria: x                                                  LIVER FUNCTIONS - ( 14 Mar 2024 08:21 )  Alb: 3.0 g/dL / Pro: 7.0 g/dL / ALK PHOS: 95 U/L / ALT: 13 U/L / AST: 17 U/L / GGT: x                                                  Culture - Blood (collected 13 Mar 2024 10:00)  Source: .Blood Blood  Gram Stain (15 Mar 2024 02:17):    Growth in anaerobic bottle: Gram Positive Cocci in Clusters  Preliminary Report (15 Mar 2024 02:18):    Growth in anaerobic bottle: Gram Positive Cocci in Clusters    Direct identification is available within approximately 3-5    hours either by Blood Panel Multiplexed PCR or Direct    MALDI-TOF. Details: [email or web address]  Organism: Blood Culture PCR (15 Mar 2024 03:53)  Organism: Blood Culture PCR (15 Mar 2024 03:53)                                                                                       ABG - ( 13 Mar 2024 22:56 )  pH, Arterial: 7.48  pH, Blood: x     /  pCO2: 45    /  pO2: 68    / HCO3: 34    / Base Excess: 9.0   /  SaO2: 94.9                MEDICATIONS  (STANDING):  acetylcysteine 20%  Inhalation 4 milliLiter(s) Inhalation every 6 hours  albuterol/ipratropium for Nebulization 3 milliLiter(s) Nebulizer every 6 hours  AQUAPHOR (petrolatum Ointment) 1 Application(s) Topical two times a day  artificial  tears Solution 1 Drop(s) Both EYES two times a day  calcium carbonate   1250 mG (OsCal) 1 Tablet(s) Oral two times a day  chlorhexidine 2% Cloths 1 Application(s) Topical daily  chlorhexidine 2% Cloths 1 Application(s) Topical daily  enoxaparin Injectable 50 milliGRAM(s) SubCutaneous every 12 hours  erythromycin   Ointment 1 Application(s) Both EYES daily  gabapentin 300 milliGRAM(s) Oral every 12 hours  levETIRAcetam  IVPB 500 milliGRAM(s) IV Intermittent two times a day  levoFLOXacin IVPB 750 milliGRAM(s) IV Intermittent every 24 hours  levoFLOXacin IVPB      melatonin 3 milliGRAM(s) Oral at bedtime  meropenem  IVPB 1000 milliGRAM(s) IV Intermittent every 8 hours  methylPREDNISolone sodium succinate Injectable 40 milliGRAM(s) IV Push daily  midodrine. 20 milliGRAM(s) Oral three times a day  pantoprazole  Injectable 40 milliGRAM(s) IV Push every 24 hours  polyethylene glycol 3350 17 Gram(s) Oral at bedtime  raloxifene 60 milliGRAM(s) Oral daily  saccharomyces boulardii 250 milliGRAM(s) Oral two times a day  senna 2 Tablet(s) Oral at bedtime  sodium chloride 0.65% Nasal 2 Spray(s) Both Nostrils three times a day  sodium chloride 0.9% Bolus 500 milliLiter(s) IV Bolus once  vancomycin  IVPB 750 milliGRAM(s) IV Intermittent every 12 hours    MEDICATIONS  (PRN):  acetaminophen     Tablet .. 650 milliGRAM(s) Oral every 8 hours PRN Temp greater or equal to 38.5C (101.3F)  aluminum hydroxide/magnesium hydroxide/simethicone Suspension 30 milliLiter(s) Oral every 4 hours PRN Dyspepsia  ondansetron Injectable 4 milliGRAM(s) IV Push every 8 hours PRN Nausea and/or Vomiting         Patient is a 58y old  Female who presents with a chief complaint of Respiratory Distress (14 Mar 2024 14:05)        Over Night Events: event noted. HF 40/40, low grade fever      PHYSICAL EXAM    ICU Vital Signs Last 24 Hrs  T(C): 36.8 (15 Mar 2024 04:00), Max: 37.9 (14 Mar 2024 17:33)  T(F): 98.3 (15 Mar 2024 04:00), Max: 100.2 (14 Mar 2024 17:33)  HR: 105 (15 Mar 2024 04:00) (98 - 120)  BP: 111/63 (15 Mar 2024 04:00) (87/53 - 111/63)  BP(mean): 81 (15 Mar 2024 04:00) (70 - 81)  RR: 18 (15 Mar 2024 04:00) (18 - 20)  SpO2: 94% (15 Mar 2024 04:00) (92% - 100%)    O2 Parameters below as of 14 Mar 2024 21:30  Patient On (Oxygen Delivery Method): nasal cannula, high flow          ill looking  CARDIAC: Normal rate, Regular rhythm.    RESPIRATORY: bl rhonchi  GASTROINTESTINAL: Abdomen soft, Non-tender, No guarding,   NEUROLOGICAL: Non arousable.   SKIN: Skin normal color for race, Warm and dry and intact.   skin ulcer          LABS:                            7.7    20.73 )-----------( 514      ( 14 Mar 2024 08:21 )             25.1                                               03-14    134<L>  |  95<L>  |  15  ----------------------------<  201<H>  3.7   |  30  |  0.6<L>    Ca    9.3      14 Mar 2024 08:21  Mg     2.2     03-14    TPro  7.0  /  Alb  3.0<L>  /  TBili  0.5  /  DBili  x   /  AST  17  /  ALT  13  /  AlkPhos  95  03-14                                             Urinalysis Basic - ( 14 Mar 2024 08:21 )    Color: x / Appearance: x / SG: x / pH: x  Gluc: 201 mg/dL / Ketone: x  / Bili: x / Urobili: x   Blood: x / Protein: x / Nitrite: x   Leuk Esterase: x / RBC: x / WBC x   Sq Epi: x / Non Sq Epi: x / Bacteria: x                                                  LIVER FUNCTIONS - ( 14 Mar 2024 08:21 )  Alb: 3.0 g/dL / Pro: 7.0 g/dL / ALK PHOS: 95 U/L / ALT: 13 U/L / AST: 17 U/L / GGT: x                                                  Culture - Blood (collected 13 Mar 2024 10:00)  Source: .Blood Blood  Gram Stain (15 Mar 2024 02:17):    Growth in anaerobic bottle: Gram Positive Cocci in Clusters  Preliminary Report (15 Mar 2024 02:18):    Growth in anaerobic bottle: Gram Positive Cocci in Clusters    Direct identification is available within approximately 3-5    hours either by Blood Panel Multiplexed PCR or Direct    MALDI-TOF. Details: [email or web address]  Organism: Blood Culture PCR (15 Mar 2024 03:53)  Organism: Blood Culture PCR (15 Mar 2024 03:53)                                                                                       ABG - ( 13 Mar 2024 22:56 )  pH, Arterial: 7.48  pH, Blood: x     /  pCO2: 45    /  pO2: 68    / HCO3: 34    / Base Excess: 9.0   /  SaO2: 94.9                MEDICATIONS  (STANDING):  acetylcysteine 20%  Inhalation 4 milliLiter(s) Inhalation every 6 hours  albuterol/ipratropium for Nebulization 3 milliLiter(s) Nebulizer every 6 hours  AQUAPHOR (petrolatum Ointment) 1 Application(s) Topical two times a day  artificial  tears Solution 1 Drop(s) Both EYES two times a day  calcium carbonate   1250 mG (OsCal) 1 Tablet(s) Oral two times a day  chlorhexidine 2% Cloths 1 Application(s) Topical daily  chlorhexidine 2% Cloths 1 Application(s) Topical daily  enoxaparin Injectable 50 milliGRAM(s) SubCutaneous every 12 hours  erythromycin   Ointment 1 Application(s) Both EYES daily  gabapentin 300 milliGRAM(s) Oral every 12 hours  levETIRAcetam  IVPB 500 milliGRAM(s) IV Intermittent two times a day  levoFLOXacin IVPB 750 milliGRAM(s) IV Intermittent every 24 hours  levoFLOXacin IVPB      melatonin 3 milliGRAM(s) Oral at bedtime  meropenem  IVPB 1000 milliGRAM(s) IV Intermittent every 8 hours  methylPREDNISolone sodium succinate Injectable 40 milliGRAM(s) IV Push daily  midodrine. 20 milliGRAM(s) Oral three times a day  pantoprazole  Injectable 40 milliGRAM(s) IV Push every 24 hours  polyethylene glycol 3350 17 Gram(s) Oral at bedtime  raloxifene 60 milliGRAM(s) Oral daily  saccharomyces boulardii 250 milliGRAM(s) Oral two times a day  senna 2 Tablet(s) Oral at bedtime  sodium chloride 0.65% Nasal 2 Spray(s) Both Nostrils three times a day  sodium chloride 0.9% Bolus 500 milliLiter(s) IV Bolus once  vancomycin  IVPB 750 milliGRAM(s) IV Intermittent every 12 hours    MEDICATIONS  (PRN):  acetaminophen     Tablet .. 650 milliGRAM(s) Oral every 8 hours PRN Temp greater or equal to 38.5C (101.3F)  aluminum hydroxide/magnesium hydroxide/simethicone Suspension 30 milliLiter(s) Oral every 4 hours PRN Dyspepsia  ondansetron Injectable 4 milliGRAM(s) IV Push every 8 hours PRN Nausea and/or Vomiting

## 2024-03-15 NOTE — PROGRESS NOTE ADULT - SUBJECTIVE AND OBJECTIVE BOX
TEA ECHAVARRIA  58y, Female  Allergy: No Known Allergies      LOS  55d    CHIEF COMPLAINT: Respiratory Distress (15 Mar 2024 08:41)      INTERVAL EVENTS/HPI  - upgraded to CEU , 3/13 BCX Staphylococcus epidermidis, Methicillin resistant: Detec  - T(F): , Max: 100.2 (03-14-24 @ 17:33)  - WBC Count: 20.73 (03-14-24 @ 08:21)  WBC Count: 13.97 (03-13-24 @ 08:35)     - Creatinine: 0.6 (03-14-24 @ 08:21)       ROS  unable to obtain history secondary to patient's mental status and/or sedation     VITALS:  T(F): 97.5, Max: 100.2 (03-14-24 @ 17:33)  HR: 95  BP: 113/70  RR: 20Vital Signs Last 24 Hrs  T(C): 36.4 (15 Mar 2024 07:36), Max: 37.9 (14 Mar 2024 17:33)  T(F): 97.5 (15 Mar 2024 07:36), Max: 100.2 (14 Mar 2024 17:33)  HR: 95 (15 Mar 2024 07:36) (95 - 120)  BP: 113/70 (15 Mar 2024 07:36) (87/53 - 113/70)  BP(mean): 87 (15 Mar 2024 07:36) (70 - 87)  RR: 20 (15 Mar 2024 07:36) (18 - 20)  SpO2: 97% (15 Mar 2024 07:36) (92% - 100%)    Parameters below as of 15 Mar 2024 07:36  Patient On (Oxygen Delivery Method): nasal cannula, high flow        PHYSICAL EXAM:  **    FH: Non-contributory  Social Hx: Non-contributory    TESTS & MEASUREMENTS:                        7.7    20.73 )-----------( 514      ( 14 Mar 2024 08:21 )             25.1     03-14    134<L>  |  95<L>  |  15  ----------------------------<  201<H>  3.7   |  30  |  0.6<L>    Ca    9.3      14 Mar 2024 08:21  Mg     2.2     03-14    TPro  7.0  /  Alb  3.0<L>  /  TBili  0.5  /  DBili  x   /  AST  17  /  ALT  13  /  AlkPhos  95  03-14      LIVER FUNCTIONS - ( 14 Mar 2024 08:21 )  Alb: 3.0 g/dL / Pro: 7.0 g/dL / ALK PHOS: 95 U/L / ALT: 13 U/L / AST: 17 U/L / GGT: x           Urinalysis Basic - ( 14 Mar 2024 08:21 )    Color: x / Appearance: x / SG: x / pH: x  Gluc: 201 mg/dL / Ketone: x  / Bili: x / Urobili: x   Blood: x / Protein: x / Nitrite: x   Leuk Esterase: x / RBC: x / WBC x   Sq Epi: x / Non Sq Epi: x / Bacteria: x        Culture - Blood (collected 03-13-24 @ 10:00)  Source: .Blood Blood  Gram Stain (03-15-24 @ 02:17):    Growth in anaerobic bottle: Gram Positive Cocci in Clusters  Preliminary Report (03-15-24 @ 02:18):    Growth in anaerobic bottle: Gram Positive Cocci in Clusters    Direct identification is available within approximately 3-5    hours either by Blood Panel Multiplexed PCR or Direct    MALDI-TOF. Details: [email or web address]  Organism: Blood Culture PCR (03-15-24 @ 03:53)  Organism: Blood Culture PCR (03-15-24 @ 03:53)      Method Type: PCR      -  Staphylococcus epidermidis, Methicillin resistant: Detec        INFECTIOUS DISEASES TESTING  Procalcitonin, Serum: 0.19 (03-13-24 @ 07:08)  Fungitell: 73 (02-23-24 @ 18:19)  MRSA PCR Result.: Negative (02-20-24 @ 13:42)  Fungitell: 76 (02-19-24 @ 16:00)  Procalcitonin, Serum: 0.16 (02-19-24 @ 16:00)  Procalcitonin, Serum: 0.20 (02-19-24 @ 11:23)  Rapid RVP Result: NotDetec (02-17-24 @ 19:06)  Procalcitonin, Serum: 0.11 (02-17-24 @ 10:41)  MRSA PCR Result.: Negative (02-15-24 @ 06:20)  Procalcitonin, Serum: 0.10 (02-12-24 @ 11:17)  Rapid RVP Result: NotDetec (02-12-24 @ 10:28)  MRSA PCR Result.: Negative (02-12-24 @ 10:28)  Fungitell: 63 (02-06-24 @ 11:27)  Fungitell: 65 (02-06-24 @ 04:43)  Rapid RVP Result: NotDetec (02-04-24 @ 10:28)  MRSA PCR Result.: Negative (02-03-24 @ 21:32)  Procalcitonin, Serum: 0.15 (02-03-24 @ 11:13)  Procalcitonin, Serum: 0.22 (02-01-24 @ 16:00)  MRSA PCR Result.: Negative (01-22-24 @ 05:30)  Streptococcus pneumoniae Ag, Ur Result: Negative (01-21-24 @ 05:00)  Legionella Antigen, Urine: Negative (01-21-24 @ 05:00)  Rapid RVP Result: Detected (01-21-24 @ 00:58)  Procalcitonin, Serum: 0.51 (01-20-24 @ 21:23)  Fungitell: 325 (01-20-24 @ 21:23)  Vancomycin Level, Trough: 5.9 (11-12-23 @ 17:55)  Fungitell: 138 (11-07-23 @ 08:08)  Fungitell: 115 (11-02-23 @ 11:40)  Procalcitonin, Serum: 0.04 (11-02-23 @ 11:40)  Procalcitonin, Serum: 0.26 (10-24-23 @ 11:00)  MRSA PCR Result.: Negative (10-17-23 @ 17:20)  Fungitell: >500 (10-17-23 @ 17:20)  Procalcitonin, Serum: 4.36 (10-17-23 @ 17:20)  Procalcitonin, Serum: 4.18 (10-17-23 @ 12:35)  Procalcitonin, Serum: 0.07 (10-15-23 @ 07:28)  COVID-19 PCR: NotDetec (10-12-23 @ 09:14)  Procalcitonin, Serum: 0.40 (10-07-23 @ 10:54)  Streptococcus pneumoniae Ag, Ur Result: Negative (09-30-23 @ 14:36)  Legionella Antigen, Urine: Negative (09-30-23 @ 14:36)  MRSA PCR Result.: Negative (09-29-23 @ 16:50)  Procalcitonin, Serum: 9.78 (08-12-23 @ 11:25)  MRSA PCR Result.: Negative (08-12-23 @ 06:10)  Rapid RVP Result: NotDetec (08-12-23 @ 01:33)  Procalcitonin, Serum: 0.63 (08-10-23 @ 08:46)  Procalcitonin, Serum: 7.82 (08-04-23 @ 16:13)  MRSA PCR Result.: Negative (08-04-23 @ 14:52)  Rapid RVP Result: NotDetec (08-04-23 @ 03:00)      INFLAMMATORY MARKERS  Sedimentation Rate, Erythrocyte: 100 mm/Hr (02-03-24 @ 11:13)  C-Reactive Protein, Serum: 214.2 mg/L (02-03-24 @ 11:13)  C-Reactive Protein, Serum: 132.3 mg/L (02-01-24 @ 16:00)  Sedimentation Rate, Erythrocyte: 67 mm/Hr (10-15-23 @ 07:28)      RADIOLOGY & ADDITIONAL TESTS:  I have personally reviewed the last available Chest xray  CXR  Xray Chest 1 View- PORTABLE-Urgent:   ACC: [id number] EXAM:  XR CHEST PORTABLE URGENT 1V   ORDERED BY: AMADOR PAGE     PROCEDURE DATE:  03/13/2024          INTERPRETATION:  CLINICAL INDICATION:  Oxygen desaturation    COMPARISON: Chest radiograph 3/13/2024 at 6:39 AM    TECHNIQUE: Frontal radiograph the chest.    FINDINGS:    Support devices: Enteric tube coiled nasopharynx and terminates below the   level of the left hemidiaphragm.    Cardiac/mediastinum/hilum: Stable.    Lung parenchyma/Pleura: Diffuse bilateral patchy opacities, left greater   than right. No pleural effusion. No pneumothorax.    Skeleton/soft tissues: Stable.    IMPRESSION:    Diffuse bilateral patchy opacities, left greater than right.    Feeding tube coiled in nasopharynx and terminates in stomach.    ---End of Report ---          TABATHA RICHARD MD; Resident Radiologist  This document has been electronically signed.  ELLIOT LANDAU MD; Attending Radiologist  This document has been electronically signed. Mar 14 2024  9:15AM (03-13-24 @ 23:21)      CT  CT Chest w/ IV Cont:   ACC: [id number] EXAM:  CT CHEST IC   ORDERED BY: BRIAN LAURA     PROCEDURE DATE:  03/13/2024          INTERPRETATION:  Indication. Desaturation. Follow-up. The    Technique: CT of the chest was performed after administration of   intravenous contrastper 80 cc administered of Omnipaque 350 (20 cc   discarded).  Coronal and sagittal reformatted images as well as MIP   reconstructions were performed.    Comparison: CT thorax 2/21/2024.    Findings:    Central airways/ Lung parenchyma/ pleura : Limited evaluation due to   motion. Debris in the airways. Extensive patchy  left-sided pulmonary   opacities, slightly decreased from prior CT. Patchy right-sided opacities   also demonstrated. Findings are likely infectious in etiology. No pleural   effusion or pneumothorax.    Mediastinum/Great vessels:No pericardial effusion. No lymphadenopathy    Upper abdomen:Feeding tube coursing into stomach past field-of-view.   Cholelithiasis. Moderate hiatal hernia.    Osseous structures:Degenerative changes.      Impression:    Extensive patchy  left-sided pulmonary opacities, slightly decreased from   prior CT. Patchy right-sided opacities also demonstrated. Findings are   likely infectious in etiology. Recommend follow-up.    --- End of Report ---            CHANA GIL MD; Attending Radiologist  This document has been electronically signed. Mar 13 2024  5:21PM (03-13-24 @ 16:40)      CARDIOLOGY TESTING  recent ekg reviewed    MEDICATIONS  acetylcysteine 20%  Inhalation 4 Inhalation every 6 hours  albuterol/ipratropium for Nebulization 3 Nebulizer every 6 hours  AQUAPHOR (petrolatum Ointment) 1 Topical two times a day  artificial  tears Solution 1 Both EYES two times a day  calcium carbonate   1250 mG (OsCal) 1 Oral two times a day  chlorhexidine 2% Cloths 1 Topical daily  chlorhexidine 2% Cloths 1 Topical daily  enoxaparin Injectable 50 SubCutaneous every 12 hours  erythromycin   Ointment 1 Both EYES daily  gabapentin 300 Oral every 12 hours  levETIRAcetam  IVPB 500 IV Intermittent two times a day  levoFLOXacin IVPB     levoFLOXacin IVPB 750 IV Intermittent every 24 hours  melatonin 3 Oral at bedtime  meropenem  IVPB 1000 IV Intermittent every 8 hours  methylPREDNISolone sodium succinate Injectable 40 IV Push daily  midodrine. 20 Oral three times a day  pantoprazole  Injectable 40 IV Push every 24 hours  polyethylene glycol 3350 17 Oral at bedtime  raloxifene 60 Oral daily  saccharomyces boulardii 250 Oral two times a day  senna 2 Oral at bedtime  sodium chloride 0.65% Nasal 2 Both Nostrils three times a day  sodium chloride 0.9% Bolus 500 IV Bolus once  vancomycin  IVPB 750 IV Intermittent every 12 hours      WEIGHT  Weight (kg): 52.3 (01-21-24 @ 08:00)      ANTIBIOTICS:  levoFLOXacin IVPB      levoFLOXacin IVPB 750 milliGRAM(s) IV Intermittent every 24 hours  meropenem  IVPB 1000 milliGRAM(s) IV Intermittent every 8 hours  vancomycin  IVPB 750 milliGRAM(s) IV Intermittent every 12 hours      All available historical records have been reviewed       TEA ECHAVARRIA  58y, Female  Allergy: No Known Allergies      LOS  55d    CHIEF COMPLAINT: Respiratory Distress (15 Mar 2024 08:41)      INTERVAL EVENTS/HPI  - upgraded to CEU , 3/13 BCX Staphylococcus epidermidis, Methicillin resistant: Detec  - T(F): , Max: 100.2 (03-14-24 @ 17:33)  - WBC Count: 20.73 (03-14-24 @ 08:21)  WBC Count: 13.97 (03-13-24 @ 08:35)     - Creatinine: 0.6 (03-14-24 @ 08:21)       ROS  unable to obtain history secondary to patient's mental status and/or sedation     VITALS:  T(F): 97.5, Max: 100.2 (03-14-24 @ 17:33)  HR: 95  BP: 113/70  RR: 20Vital Signs Last 24 Hrs  T(C): 36.4 (15 Mar 2024 07:36), Max: 37.9 (14 Mar 2024 17:33)  T(F): 97.5 (15 Mar 2024 07:36), Max: 100.2 (14 Mar 2024 17:33)  HR: 95 (15 Mar 2024 07:36) (95 - 120)  BP: 113/70 (15 Mar 2024 07:36) (87/53 - 113/70)  BP(mean): 87 (15 Mar 2024 07:36) (70 - 87)  RR: 20 (15 Mar 2024 07:36) (18 - 20)  SpO2: 97% (15 Mar 2024 07:36) (92% - 100%)    Parameters below as of 15 Mar 2024 07:36  Patient On (Oxygen Delivery Method): nasal cannula, high flow        PHYSICAL EXAM:  Gen: chronically ill appearing  HFNC  HEENT: Normocephalic, atraumatic  Neck: supple, no lymphadenopathy  CV: Regular rate & regular rhythm  Lungs: decreased BS at bases, no fremitus  Abdomen: Soft, BS present  Ext: Warm, well perfused  Neuro: non focal, not following commands  Skin: no rash, no erythema  Lines: no phlebitis     FH: Non-contributory  Social Hx: Non-contributory    TESTS & MEASUREMENTS:                        7.7    20.73 )-----------( 514      ( 14 Mar 2024 08:21 )             25.1     03-14    134<L>  |  95<L>  |  15  ----------------------------<  201<H>  3.7   |  30  |  0.6<L>    Ca    9.3      14 Mar 2024 08:21  Mg     2.2     03-14    TPro  7.0  /  Alb  3.0<L>  /  TBili  0.5  /  DBili  x   /  AST  17  /  ALT  13  /  AlkPhos  95  03-14      LIVER FUNCTIONS - ( 14 Mar 2024 08:21 )  Alb: 3.0 g/dL / Pro: 7.0 g/dL / ALK PHOS: 95 U/L / ALT: 13 U/L / AST: 17 U/L / GGT: x           Urinalysis Basic - ( 14 Mar 2024 08:21 )    Color: x / Appearance: x / SG: x / pH: x  Gluc: 201 mg/dL / Ketone: x  / Bili: x / Urobili: x   Blood: x / Protein: x / Nitrite: x   Leuk Esterase: x / RBC: x / WBC x   Sq Epi: x / Non Sq Epi: x / Bacteria: x        Culture - Blood (collected 03-13-24 @ 10:00)  Source: .Blood Blood  Gram Stain (03-15-24 @ 02:17):    Growth in anaerobic bottle: Gram Positive Cocci in Clusters  Preliminary Report (03-15-24 @ 02:18):    Growth in anaerobic bottle: Gram Positive Cocci in Clusters    Direct identification is available within approximately 3-5    hours either by Blood Panel Multiplexed PCR or Direct    MALDI-TOF. Details: [email or web address]  Organism: Blood Culture PCR (03-15-24 @ 03:53)  Organism: Blood Culture PCR (03-15-24 @ 03:53)      Method Type: PCR      -  Staphylococcus epidermidis, Methicillin resistant: Detec        INFECTIOUS DISEASES TESTING  Procalcitonin, Serum: 0.19 (03-13-24 @ 07:08)  Fungitell: 73 (02-23-24 @ 18:19)  MRSA PCR Result.: Negative (02-20-24 @ 13:42)  Fungitell: 76 (02-19-24 @ 16:00)  Procalcitonin, Serum: 0.16 (02-19-24 @ 16:00)  Procalcitonin, Serum: 0.20 (02-19-24 @ 11:23)  Rapid RVP Result: NotDetec (02-17-24 @ 19:06)  Procalcitonin, Serum: 0.11 (02-17-24 @ 10:41)  MRSA PCR Result.: Negative (02-15-24 @ 06:20)  Procalcitonin, Serum: 0.10 (02-12-24 @ 11:17)  Rapid RVP Result: NotDetec (02-12-24 @ 10:28)  MRSA PCR Result.: Negative (02-12-24 @ 10:28)  Fungitell: 63 (02-06-24 @ 11:27)  Fungitell: 65 (02-06-24 @ 04:43)  Rapid RVP Result: NotDetec (02-04-24 @ 10:28)  MRSA PCR Result.: Negative (02-03-24 @ 21:32)  Procalcitonin, Serum: 0.15 (02-03-24 @ 11:13)  Procalcitonin, Serum: 0.22 (02-01-24 @ 16:00)  MRSA PCR Result.: Negative (01-22-24 @ 05:30)  Streptococcus pneumoniae Ag, Ur Result: Negative (01-21-24 @ 05:00)  Legionella Antigen, Urine: Negative (01-21-24 @ 05:00)  Rapid RVP Result: Detected (01-21-24 @ 00:58)  Procalcitonin, Serum: 0.51 (01-20-24 @ 21:23)  Fungitell: 325 (01-20-24 @ 21:23)  Vancomycin Level, Trough: 5.9 (11-12-23 @ 17:55)  Fungitell: 138 (11-07-23 @ 08:08)  Fungitell: 115 (11-02-23 @ 11:40)  Procalcitonin, Serum: 0.04 (11-02-23 @ 11:40)  Procalcitonin, Serum: 0.26 (10-24-23 @ 11:00)  MRSA PCR Result.: Negative (10-17-23 @ 17:20)  Fungitell: >500 (10-17-23 @ 17:20)  Procalcitonin, Serum: 4.36 (10-17-23 @ 17:20)  Procalcitonin, Serum: 4.18 (10-17-23 @ 12:35)  Procalcitonin, Serum: 0.07 (10-15-23 @ 07:28)  COVID-19 PCR: NotDetec (10-12-23 @ 09:14)  Procalcitonin, Serum: 0.40 (10-07-23 @ 10:54)  Streptococcus pneumoniae Ag, Ur Result: Negative (09-30-23 @ 14:36)  Legionella Antigen, Urine: Negative (09-30-23 @ 14:36)  MRSA PCR Result.: Negative (09-29-23 @ 16:50)  Procalcitonin, Serum: 9.78 (08-12-23 @ 11:25)  MRSA PCR Result.: Negative (08-12-23 @ 06:10)  Rapid RVP Result: NotDetec (08-12-23 @ 01:33)  Procalcitonin, Serum: 0.63 (08-10-23 @ 08:46)  Procalcitonin, Serum: 7.82 (08-04-23 @ 16:13)  MRSA PCR Result.: Negative (08-04-23 @ 14:52)  Rapid RVP Result: NotDetec (08-04-23 @ 03:00)      INFLAMMATORY MARKERS  Sedimentation Rate, Erythrocyte: 100 mm/Hr (02-03-24 @ 11:13)  C-Reactive Protein, Serum: 214.2 mg/L (02-03-24 @ 11:13)  C-Reactive Protein, Serum: 132.3 mg/L (02-01-24 @ 16:00)  Sedimentation Rate, Erythrocyte: 67 mm/Hr (10-15-23 @ 07:28)      RADIOLOGY & ADDITIONAL TESTS:  I have personally reviewed the last available Chest xray  CXR  Xray Chest 1 View- PORTABLE-Urgent:   ACC: [id number] EXAM:  XR CHEST PORTABLE URGENT 1V   ORDERED BY: AMADOR PAGE     PROCEDURE DATE:  03/13/2024          INTERPRETATION:  CLINICAL INDICATION:  Oxygen desaturation    COMPARISON: Chest radiograph 3/13/2024 at 6:39 AM    TECHNIQUE: Frontal radiograph the chest.    FINDINGS:    Support devices: Enteric tube coiled nasopharynx and terminates below the   level of the left hemidiaphragm.    Cardiac/mediastinum/hilum: Stable.    Lung parenchyma/Pleura: Diffuse bilateral patchy opacities, left greater   than right. No pleural effusion. No pneumothorax.    Skeleton/soft tissues: Stable.    IMPRESSION:    Diffuse bilateral patchy opacities, left greater than right.    Feeding tube coiled in nasopharynx and terminates in stomach.    ---End of Report ---          TABATHA RICHARD MD; Resident Radiologist  This document has been electronically signed.  ELLIOT LANDAU MD; Attending Radiologist  This document has been electronically signed. Mar 14 2024  9:15AM (03-13-24 @ 23:21)      CT  CT Chest w/ IV Cont:   ACC: [id number] EXAM:  CT CHEST IC   ORDERED BY: BRIAN LAURA     PROCEDURE DATE:  03/13/2024          INTERPRETATION:  Indication. Desaturation. Follow-up. The    Technique: CT of the chest was performed after administration of   intravenous contrastper 80 cc administered of Omnipaque 350 (20 cc   discarded).  Coronal and sagittal reformatted images as well as MIP   reconstructions were performed.    Comparison: CT thorax 2/21/2024.    Findings:    Central airways/ Lung parenchyma/ pleura : Limited evaluation due to   motion. Debris in the airways. Extensive patchy  left-sided pulmonary   opacities, slightly decreased from prior CT. Patchy right-sided opacities   also demonstrated. Findings are likely infectious in etiology. No pleural   effusion or pneumothorax.    Mediastinum/Great vessels:No pericardial effusion. No lymphadenopathy    Upper abdomen:Feeding tube coursing into stomach past field-of-view.   Cholelithiasis. Moderate hiatal hernia.    Osseous structures:Degenerative changes.      Impression:    Extensive patchy  left-sided pulmonary opacities, slightly decreased from   prior CT. Patchy right-sided opacities also demonstrated. Findings are   likely infectious in etiology. Recommend follow-up.    --- End of Report ---            CHANA GIL MD; Attending Radiologist  This document has been electronically signed. Mar 13 2024  5:21PM (03-13-24 @ 16:40)      CARDIOLOGY TESTING  recent ekg reviewed    MEDICATIONS  acetylcysteine 20%  Inhalation 4 Inhalation every 6 hours  albuterol/ipratropium for Nebulization 3 Nebulizer every 6 hours  AQUAPHOR (petrolatum Ointment) 1 Topical two times a day  artificial  tears Solution 1 Both EYES two times a day  calcium carbonate   1250 mG (OsCal) 1 Oral two times a day  chlorhexidine 2% Cloths 1 Topical daily  chlorhexidine 2% Cloths 1 Topical daily  enoxaparin Injectable 50 SubCutaneous every 12 hours  erythromycin   Ointment 1 Both EYES daily  gabapentin 300 Oral every 12 hours  levETIRAcetam  IVPB 500 IV Intermittent two times a day  levoFLOXacin IVPB     levoFLOXacin IVPB 750 IV Intermittent every 24 hours  melatonin 3 Oral at bedtime  meropenem  IVPB 1000 IV Intermittent every 8 hours  methylPREDNISolone sodium succinate Injectable 40 IV Push daily  midodrine. 20 Oral three times a day  pantoprazole  Injectable 40 IV Push every 24 hours  polyethylene glycol 3350 17 Oral at bedtime  raloxifene 60 Oral daily  saccharomyces boulardii 250 Oral two times a day  senna 2 Oral at bedtime  sodium chloride 0.65% Nasal 2 Both Nostrils three times a day  sodium chloride 0.9% Bolus 500 IV Bolus once  vancomycin  IVPB 750 IV Intermittent every 12 hours      WEIGHT  Weight (kg): 52.3 (01-21-24 @ 08:00)      ANTIBIOTICS:  levoFLOXacin IVPB      levoFLOXacin IVPB 750 milliGRAM(s) IV Intermittent every 24 hours  meropenem  IVPB 1000 milliGRAM(s) IV Intermittent every 8 hours  vancomycin  IVPB 750 milliGRAM(s) IV Intermittent every 12 hours      All available historical records have been reviewed

## 2024-03-15 NOTE — PROGRESS NOTE ADULT - SUBJECTIVE AND OBJECTIVE BOX
58F w/ PMHx Down Syndrome, nonverbal at baseline, Hypothyroidism, Cerebral palsy and Seizure Disorders presents to the ED from nursing facility/group home ([facility]) presented to ED for respiratory distress and high grade fever. Patient found to be septic on admission.Admitted to SDU for management of acute respiratory distress 2/2 CAP/Aspiration PNA (20 Jan 2024 18:22)  While pt was on the floor she developed dyspnea after swallow evaluation, was spiking high grade fever, consulted by pulmonary/critical care and was upgraded back to SDU.  Pt developed left lung white out, improved after aggressive chest PT, treated with Meropenem and caspofungin, ID is following, her overall prognosis is very poor, she might need trach and PEG in the nearest future , a meeting with facility staff and state took place on 2/14.   Pt completed course o Meropenem, Levofloxacin added on 2/15 ( pt spiked fever), Meropenem resumed on 2/18,  she was tapered off pressure support, requires  high flow oxygen for  a while in SDU  Hb dropped without acute blood loss, will  monitor closely ( pt was on full AC).   After prolonged and complicated hospital course pt was made DNR/DNI, consulted by palliative care, CMO was discussed with consumer advisory board [facility] in case if pt'll fail recommended treatement.   Eventually pt was  off pressure support, transitioned to NC, downgraded to the floor. She completed the course of Caspofungin and Abx clinically improved and was relatively stable for a while on medical floor  On  3/14 developed respiratory distress with high oxygen requirements, worsening leukocytosis and was upgraded back to step down unit.   Today pt is awake, nonverbal, looks calm.     PAST MEDICAL & SURGICAL HISTORY:  Down syndrome  Osteoporosis  Mild anemia  Neuropathy  S/P debridement  of R hip on 3/2/21      Vital Signs Last 24 Hrs  T(C): 37.1 (15 Mar 2024 16:02), Max: 37.4 (14 Mar 2024 21:30)  T(F): 98.8 (15 Mar 2024 16:02), Max: 99.3 (14 Mar 2024 21:30)  HR: 77 (15 Mar 2024 16:02) (72 - 112)  BP: 123/66 (15 Mar 2024 16:02) (87/53 - 123/66)  BP(mean): 93 (15 Mar 2024 11:10) (70 - 93)  RR: 19 (15 Mar 2024 16:02) (18 - 20)  SpO2: 96% (15 Mar 2024 16:02) (92% - 100%)    Parameters below as of 15 Mar 2024 16:02  Patient On (Oxygen Delivery Method): nasal cannula, high flow      PHYSICAL EXAM:  GENERAL:  NAD chronically ill appearing, mentally challenged   SKIN: No rashes or lesions  HEENT: Atraumatic. Normocephalic   NECK: Supple, No JVD.    PULMONARY: decreased breath sounds B/L, poor air entry   CVS: Normal S1, S2. Rate and Rhythm are regular.    ABDOMEN/GI: Soft, Nontender, Nondistended.  MSK:  contracted LE   NEUROLOGIC: does not follow commands, opens eyes spontaneously   PSYCH: Alert & oriented x 0    Consultant(s) Notes Reviewed:  [x ] YES  [ ] NO  Care Discussed with Consultants/Other Providers [ x] YES  [ ] NO    LABS:                          7.8    16.16 )-----------( 526      ( 15 Mar 2024 11:43 )             26.3   03-14    134<L>  |  95<L>  |  15  ----------------------------<  201<H>  3.7   |  30  |  0.6<L>    Ca    9.3      14 Mar 2024 08:21  Mg     2.2     03-14    TPro  7.0  /  Alb  3.0<L>  /  TBili  0.5  /  DBili  x   /  AST  17  /  ALT  13  /  AlkPhos  95  03-14    Culture - Blood (collected 25 Feb 2024 11:47)  Source: .Blood None  Preliminary Report (26 Feb 2024 20:02):    No growth at 24 hours    Culture - Blood (03.13.24 @ 10:00)   - Staphylococcus epidermidis, Methicillin resistant: Detec  Gram Stain:   Growth in anaerobic bottle: Gram Positive Cocci in Clusters  Specimen Source: .Blood Blood  Organism: Blood Culture PCR  Culture Results:   Growth in anaerobic bottle: Gram Positive Cocci in Clusters   Direct identification is available within approximately 3-5   hours either by Blood Panel Multiplexed PCR or Direct   MALDI-TOF. Details: [email or web address]  Organism Identification: Blood Culture PCR  Method Type: PCR    RADIOLOGY & ADDITIONAL TESTS:  Imaging or report Personally Reviewed:  [x] YES  [ ] NO  EKG reviewed: [x] YES  [ ] NO    < from: Xray Chest 1 View- PORTABLE-Routine (02.28.24 @ 05:32) >    Impression:    Bilateral opacifications, left greater than right, stable. Support   devices as described.    < end of copied text >  < from: CT Chest w/ IV Cont (02.21.24 @ 00:09) >  Impression:    Bilateral pneumonia, left worse than right, with a left upper lobe   thick-walled cavity.    Enteric catheter as described. Note that by the time that this report was   dictated, this issue had been tended to..    < end of copied text >  < from: CT Abdomen and Pelvis w/ IV Cont (02.21.24 @ 00:10) >    IMPRESSION:  1.  No CT evidence of an acuteabdominopelvic pathology.    See separately dictated CT chest report for intrathoracic findings.    < end of copied text >  < from: Xray Chest 1 View- PORTABLE-Routine (03.01.24 @ 06:58) >  impression:    EKG leads overlie thorax, obscuring anatomy.    Support devices: NG tube coursing below the left hemidiaphragm. The tip   is not included..    Cardiac/ Mediastinum: Stable    Lungs/ Pleura: Unchanged left greater than right opacities. No   pneumothorax    Skeletal/ soft tissues: Stable          < from: Xray Chest 1 View- PORTABLE-Routine (03.03.24 @ 06:11) >    IMPRESSION:    Unchanged left-sided opacities.      < from: Xray Chest 1 View- PORTABLE-Routine (03.04.24 @ 06:01) >  Impression:    Bilateral opacifications. Support devices as described.    < from: CT Chest w/ IV Cont (03.13.24 @ 16:40) >  Impression:    Extensive patchy  left-sided pulmonary opacities, slightly decreased from   prior CT. Patchy right-sided opacities also demonstrated. Findings are   likely infectious in etiology. Recommend follow-up.    < end of copied text >      MEDICATIONS  (STANDING):  acetylcysteine 20%  Inhalation 4 milliLiter(s) Inhalation every 6 hours  albuterol/ipratropium for Nebulization 3 milliLiter(s) Nebulizer every 6 hours  AQUAPHOR (petrolatum Ointment) 1 Application(s) Topical two times a day  artificial  tears Solution 1 Drop(s) Both EYES two times a day  calcium carbonate   1250 mG (OsCal) 1 Tablet(s) Oral two times a day  chlorhexidine 2% Cloths 1 Application(s) Topical daily  chlorhexidine 2% Cloths 1 Application(s) Topical daily  enoxaparin Injectable 50 milliGRAM(s) SubCutaneous every 12 hours  erythromycin   Ointment 1 Application(s) Both EYES daily  gabapentin 300 milliGRAM(s) Oral every 12 hours  levETIRAcetam  IVPB 500 milliGRAM(s) IV Intermittent two times a day  levoFLOXacin IVPB 750 milliGRAM(s) IV Intermittent every 24 hours  levoFLOXacin IVPB      melatonin 3 milliGRAM(s) Oral at bedtime  meropenem  IVPB 1000 milliGRAM(s) IV Intermittent every 8 hours  methylPREDNISolone sodium succinate Injectable 40 milliGRAM(s) IV Push daily  midodrine. 20 milliGRAM(s) Oral three times a day  pantoprazole  Injectable 40 milliGRAM(s) IV Push every 24 hours  polyethylene glycol 3350 17 Gram(s) Oral at bedtime  raloxifene 60 milliGRAM(s) Oral daily  saccharomyces boulardii 250 milliGRAM(s) Oral two times a day  senna 2 Tablet(s) Oral at bedtime  sodium chloride 0.65% Nasal 2 Spray(s) Both Nostrils three times a day  sodium chloride 0.9% Bolus 500 milliLiter(s) IV Bolus once  vancomycin  IVPB 750 milliGRAM(s) IV Intermittent every 12 hours    MEDICATIONS  (PRN):  acetaminophen     Tablet .. 650 milliGRAM(s) Oral every 8 hours PRN Temp greater or equal to 38.5C (101.3F)  aluminum hydroxide/magnesium hydroxide/simethicone Suspension 30 milliLiter(s) Oral every 4 hours PRN Dyspepsia  ondansetron Injectable 4 milliGRAM(s) IV Push every 8 hours PRN Nausea and/or Vomiting

## 2024-03-15 NOTE — PROGRESS NOTE ADULT - SUBJECTIVE AND OBJECTIVE BOX
124H events:    Patient is a 58y old Female who presents with a chief complaint of Respiratory Distress (15 Mar 2024 07:33)    Primary diagnosis of Sepsis with acute hypoxic respiratory failure    Today is hospital day 55d. This morning patient was seen and examined at bedside, resting comfortably in bed.    No acute or major events overnight.      PAST MEDICAL & SURGICAL HISTORY  Down syndrome    Osteoporosis    Mild anemia    Neuropathy    S/P debridement  of R hip on 3/2/21      SOCIAL HISTORY:  Social History:      ALLERGIES:  No Known Allergies    MEDICATIONS:  STANDING MEDICATIONS  acetylcysteine 20%  Inhalation 4 milliLiter(s) Inhalation every 6 hours  albuterol/ipratropium for Nebulization 3 milliLiter(s) Nebulizer every 6 hours  AQUAPHOR (petrolatum Ointment) 1 Application(s) Topical two times a day  artificial  tears Solution 1 Drop(s) Both EYES two times a day  calcium carbonate   1250 mG (OsCal) 1 Tablet(s) Oral two times a day  chlorhexidine 2% Cloths 1 Application(s) Topical daily  chlorhexidine 2% Cloths 1 Application(s) Topical daily  enoxaparin Injectable 50 milliGRAM(s) SubCutaneous every 12 hours  erythromycin   Ointment 1 Application(s) Both EYES daily  gabapentin 300 milliGRAM(s) Oral every 12 hours  levETIRAcetam  IVPB 500 milliGRAM(s) IV Intermittent two times a day  levoFLOXacin IVPB 750 milliGRAM(s) IV Intermittent every 24 hours  levoFLOXacin IVPB      melatonin 3 milliGRAM(s) Oral at bedtime  meropenem  IVPB 1000 milliGRAM(s) IV Intermittent every 8 hours  methylPREDNISolone sodium succinate Injectable 40 milliGRAM(s) IV Push daily  midodrine. 20 milliGRAM(s) Oral three times a day  pantoprazole  Injectable 40 milliGRAM(s) IV Push every 24 hours  polyethylene glycol 3350 17 Gram(s) Oral at bedtime  raloxifene 60 milliGRAM(s) Oral daily  saccharomyces boulardii 250 milliGRAM(s) Oral two times a day  senna 2 Tablet(s) Oral at bedtime  sodium chloride 0.65% Nasal 2 Spray(s) Both Nostrils three times a day  sodium chloride 0.9% Bolus 500 milliLiter(s) IV Bolus once  vancomycin  IVPB 750 milliGRAM(s) IV Intermittent every 12 hours    PRN MEDICATIONS  acetaminophen     Tablet .. 650 milliGRAM(s) Oral every 8 hours PRN  aluminum hydroxide/magnesium hydroxide/simethicone Suspension 30 milliLiter(s) Oral every 4 hours PRN  ondansetron Injectable 4 milliGRAM(s) IV Push every 8 hours PRN    VITALS:   T(F): 97.5  HR: 95  BP: 113/70  RR: 20  SpO2: 97%    PHYSICAL EXAM:  GENERAL:   ( x ) NAD, lying in bed comfortably     (  ) obtunded     (  ) lethargic     (  ) somnolent  patient non verbal and non cooperative at baseline     HEAD:   ( x ) Atraumatic     (  ) hematoma     (  ) laceration (specify location:       )     NECK:  (x  ) Supple     (  ) neck stiffness     (  ) nuchal rigidity     (  )  no JVD     (  ) JVD present ( -- cm)    HEART:  Rate -->     ( x ) normal rate     (  ) bradycardic     (  ) tachycardic  Rhythm -->     ( x ) regular     (  ) regularly irregular     (  ) irregularly irregular  Murmurs -->     ( x ) normal s1s2     (  ) systolic murmur     (  ) diastolic murmur     (  ) continuous murmur      (  ) S3 present     (  ) S4 present    LUNGS:   ( x )Unlabored respirations     (  ) tachypnea  ( x ) B/L air entry     (  ) decreased breath sounds in:  (location     )    (  ) no adventitious sound     (  ) crackles     (  ) wheezing      ( x ) rhonchi      (specify location:       )  (  ) chest wall tenderness (specify location:       )    ABDOMEN:   (x ) Soft     (  ) tense   |   ( x ) nondistended     (  ) distended   |   (x  ) +BS     (  ) hypoactive bowel sounds     (  ) hyperactive bowel sounds  (x ) nontender     (  ) RUQ tenderness     (  ) RLQ tenderness     (  ) LLQ tenderness     (  ) epigastric tenderness     (  ) diffuse tenderness  (  ) Splenomegaly      (  ) Hepatomegaly      (  ) Jaundice     (  ) ecchymosis     EXTREMITIES:  ( x ) Normal     (  ) Rash     (  ) ecchymosis     (  ) varicose veins      (  ) pitting edema     (  ) non-pitting edema   (  ) ulceration     (  ) gangrene:     (location:     )    NERVOUS SYSTEM:    Full neuro exam could not be done due to the patient state     SKIN:   ( x ) No rashes or lesions     (  ) maculopapular rash     (  ) pustules     (  ) vesicles     (  ) ulcer     (  ) ecchymosis     (specify location:     )    AMPAC score:      LABS:                        7.7    20.73 )-----------( 514      ( 14 Mar 2024 08:21 )             25.1     03-14    134<L>  |  95<L>  |  15  ----------------------------<  201<H>  3.7   |  30  |  0.6<L>    Ca    9.3      14 Mar 2024 08:21  Mg     2.2     03-14    TPro  7.0  /  Alb  3.0<L>  /  TBili  0.5  /  DBili  x   /  AST  17  /  ALT  13  /  AlkPhos  95  03-14      Urinalysis Basic - ( 14 Mar 2024 08:21 )    Color: x / Appearance: x / SG: x / pH: x  Gluc: 201 mg/dL / Ketone: x  / Bili: x / Urobili: x   Blood: x / Protein: x / Nitrite: x   Leuk Esterase: x / RBC: x / WBC x   Sq Epi: x / Non Sq Epi: x / Bacteria: x      ABG - ( 13 Mar 2024 22:56 )  pH, Arterial: 7.48  pH, Blood: x     /  pCO2: 45    /  pO2: 68    / HCO3: 34    / Base Excess: 9.0   /  SaO2: 94.9                  Culture - Blood (collected 13 Mar 2024 10:00)  Source: .Blood Blood  Gram Stain (15 Mar 2024 02:17):    Growth in anaerobic bottle: Gram Positive Cocci in Clusters  Preliminary Report (15 Mar 2024 02:18):    Growth in anaerobic bottle: Gram Positive Cocci in Clusters    Direct identification is available within approximately 3-5    hours either by Blood Panel Multiplexed PCR or Direct    MALDI-TOF. Details: [email or web address]  Organism: Blood Culture PCR (15 Mar 2024 03:53)  Organism: Blood Culture PCR (15 Mar 2024 03:53)          RADIOLOGY:

## 2024-03-15 NOTE — PROGRESS NOTE ADULT - ASSESSMENT
58F w/ PMHx Down Syndrome, nonverbal at baseline, Hypothyroidism, Cerebral palsy and Seizure Disorders presents to the ED from nursing facility/group home ([facility]) presented to ED for respiratory distress and high grade fever. Patient found to be septic on admission. Admitted to SDU for management of acute respiratory distress 2/2 CAP/Aspiration PNA (20 Jan 2024 18:22)  While pt was on the floor she developed dyspnea  consulted by pulmonary/critical care and was upgraded back to SDU.      A/P  # Sepsis POA / Acute hypoxic resp failure / RSV bronchiolitis /  H/o Dysphagia/ suspected aspiration    -  pt is on  high flow oxygen now  - aggressive pulmonary toilet, chest PT Q 4 hours, aspiration precautions, frequent suction   - ID is following, recommendations noted:   - Remove midline as + BCX coNS , may be a true pathogen and patient critically ill   - Repeat BCX  - expanded RVP   - midline 2/26  - Vanc dosing AUC/ZANE per clinical pharmacist   - check  fungitell   - Collect Sputum cx if possible  - Continue Meropenem 1g q8h IV,  Levaquin 750mg q24h IV , monitor QTC   - Doubt fungal in nature , fungal workup previously negative, f/u fungitell   - Failed FEES,  NG tube for feedings and medications  - pulmonary is following  - c/w IV steroids   - Grave prognosis, aggressive care is futile  - consumer advisory board agreed for CMO ( paperwork in the  paper chart) , will start formal process to change the level of care to CMO      # Seizure Disorder  - c/w  Keppra   - seizure precautions  - keep Mg above 2.0     # H/o lower ext DVT   - c/w therapeutic Lovenox, Eliquis on dc    #  Hypothyroidism  - TSH 0.51    # Normocytic Anemia, anemia of chronic disease   - monitor H/H, keep Hb above 7.5     # Down Syndrome/  Cerebral Palsy/ functional quadriplegia   - supportive care  - prevent falls and aspiration   - failed speech and swallow, might  need  PEG ( see above)  - on Raloxifene     Palliative care team is following ,  pt is  DNR/DNI ( approved by [facility]) , will start formal process to  change the  level of care to CMO (   approved by Consumer Advisory Board [place] [employer], paperwork in the paper chart)

## 2024-03-15 NOTE — PROGRESS NOTE ADULT - ASSESSMENT
58F with Down syndrome, nonverbal at baseline, hypothyroidism, CP, and seizure disorder here from [facility] with fever and respiratory distress. Found to be septic on admission, from CAP/aspiration PNA, on IV vanco and meropenem, with course c/b continued fevers, and bacteremia with S. hominis. Also failed FEES, has NGT in place and will likely need PEG. Is requiring HFNC alternating with BiPAP. Is also on midodrine for hypotension. Patient is full code. Of note patient is under [place] [facility]. Palliative care consulted for [facility].

## 2024-03-15 NOTE — PROGRESS NOTE ADULT - SUBJECTIVE AND OBJECTIVE BOX
HPI: 58F with Down syndrome, nonverbal at baseline, hypothyroidism, CP, and seizure disorder here from [facility] with fever and respiratory distress. Found to be septic on admission, from CAP/aspiration PNA, on IV vanco and meropenem, with course c/b continued fevers, and bacteremia with S. hominis. Also failed FEES, has NGT in place and will likely need PEG. Is requiring HFNC alternating with BiPAP. Is also on midodrine for hypotension. Patient is full code. Of note patient is under [facility]. Palliative care consulted for [facility].    INTERVAL EVENTS  2/29: patient appears comfortable overall, group home staff at bedside  3/1: patient appears more comfortable today, no objective signs of pain or discomfort  3/4: patient appears comfortable  3/13: patient with increasing O2 requirement today, restarted on IV abx  3/14: patient on HFNC, appears comfortable  3/15: on HFNC, upgraded to 2A/CEU    ADVANCE DIRECTIVES:    [ ] Full Code [X ] DNR  MOLST  [ ]  Living Will  [ ]   DECISION MAKER(s):  [ ] Health Care Proxy(s)  [ ] Surrogate(s)  [ ] Guardian           Name(s): Phone Number(s): [facility]    BASELINE (I)ADL(s) (prior to admission):  [place]: [ ]Total  [ ] Moderate [ ]Dependent  Palliative Performance Status Version 2:         %    http://npcrc.org/files/news/palliative_performance_scale_ppsv2.pdf    Allergies    No Known Allergies    Intolerances    MEDICATIONS  (STANDING):  acetylcysteine 20%  Inhalation 4 milliLiter(s) Inhalation every 6 hours  albuterol/ipratropium for Nebulization 3 milliLiter(s) Nebulizer every 6 hours  AQUAPHOR (petrolatum Ointment) 1 Application(s) Topical two times a day  artificial  tears Solution 1 Drop(s) Both EYES two times a day  calcium carbonate   1250 mG (OsCal) 1 Tablet(s) Oral two times a day  chlorhexidine 2% Cloths 1 Application(s) Topical daily  chlorhexidine 2% Cloths 1 Application(s) Topical daily  enoxaparin Injectable 50 milliGRAM(s) SubCutaneous every 12 hours  erythromycin   Ointment 1 Application(s) Both EYES daily  gabapentin 300 milliGRAM(s) Oral every 12 hours  levETIRAcetam  IVPB 500 milliGRAM(s) IV Intermittent two times a day  levoFLOXacin IVPB 750 milliGRAM(s) IV Intermittent every 24 hours  levoFLOXacin IVPB      melatonin 3 milliGRAM(s) Oral at bedtime  meropenem  IVPB 1000 milliGRAM(s) IV Intermittent every 8 hours  methylPREDNISolone sodium succinate Injectable 40 milliGRAM(s) IV Push daily  midodrine. 20 milliGRAM(s) Oral three times a day  pantoprazole  Injectable 40 milliGRAM(s) IV Push every 24 hours  polyethylene glycol 3350 17 Gram(s) Oral at bedtime  raloxifene 60 milliGRAM(s) Oral daily  saccharomyces boulardii 250 milliGRAM(s) Oral two times a day  senna 2 Tablet(s) Oral at bedtime  sodium chloride 0.65% Nasal 2 Spray(s) Both Nostrils three times a day  sodium chloride 0.9% Bolus 500 milliLiter(s) IV Bolus once  vancomycin  IVPB 750 milliGRAM(s) IV Intermittent every 12 hours    MEDICATIONS  (PRN):  acetaminophen     Tablet .. 650 milliGRAM(s) Oral every 8 hours PRN Temp greater or equal to 38.5C (101.3F)  aluminum hydroxide/magnesium hydroxide/simethicone Suspension 30 milliLiter(s) Oral every 4 hours PRN Dyspepsia  ondansetron Injectable 4 milliGRAM(s) IV Push every 8 hours PRN Nausea and/or Vomiting      PRESENT SYMPTOMS: [X ]Unable to obtain due to poor mentation   Source if other than patient:  [ ]Family   [ ]Team     Pain: [ ]yes [ ]no  QOL impact -   Location -                    Aggravating factors -  Quality -  Radiation -  Timing-  Severity (0-10 scale):  Minimal acceptable level (0-10 scale):     CPOT:    https://www.sccm.org/getattachment/ggh45i75-3s2t-8j1l-3u7f-2961o5029q4o/Critical-Care-Pain-Observation-Tool-(CPOT)    PAIN AD Score: 0  [email or web address]/painad.pdf (press ctrl +  left click to view)    Dyspnea:                           [ ]None[ ]Mild [ ]Moderate [ ]Severe     Respiratory Distress Observation Scale (RDOS): 0  A score of 0 to 2 signifies little or no respiratory distress, 3 signifies mild distress, scores 4 to 6 indicate moderate distress, and scores greater than 7 signify severe distress  [email or web address]/sites/default/[email or web address]-twhfmzsewrp-[email or web address]-pkjifpfntei-lncto.pdf    Anxiety:                             [ ]None[ ]Mild [ ]Moderate [ ]Severe   Fatigue:                             [ ]None[ ]Mild [ ]Moderate [ ]Severe   Nausea:                             [ ]None[ ]Mild [ ]Moderate [ ]Severe   Loss of appetite:              [ ]None[ ]Mild [ ]Moderate [ ]Severe   Constipation:                    [ ]None[ ]Mild [ ]Moderate [ ]Severe    Other Symptoms:  [ ]All other review of systems negative     Palliative Performance Status Version 2:         %    http://[email or web address]/files/news/palliative_performance_scale_[email or web address].pdf  PHYSICAL EXAM:  Vital Signs Last 24 Hrs  T(C): 36.7 (03-15-24 @ 11:10), Max: 37.9 (03-14-24 @ 17:33)  T(F): 98.1 (03-15-24 @ 11:10), Max: 100.2 (03-14-24 @ 17:33)  HR: 72 (03-15-24 @ 11:10) (72 - 120)  BP: 113/79 (03-15-24 @ 11:10) (87/53 - 113/79)  BP(mean): 93 (03-15-24 @ 11:10) (70 - 93)  RR: 18 (03-15-24 @ 11:10) (18 - 20)  SpO2: 97% (03-15-24 @ 11:10) (92% - 100%)          GENERAL:  [X ] No acute distress [ ]Lethargic  [ ]Unarousable  [ ]Verbal  [ ]Non-Verbal [ ]Cachexia    BEHAVIORAL/PSYCH:  [ ]Alert and Oriented x  [ ] Anxiety [ ] Delirium [ ] Agitation [X ] Calm   EYES: [ X] No scleral icterus [ ] Scleral icterus [ ] Closed  ENMT:  [ ]Dry mouth  [ ]No external oral lesions [ X] No external ear or nose lesions  CARDIOVASCULAR:  [ ]Regular [ ]Irregular [ ]Tachy [ ]Not Tachy  [ ]Raheem [ ] Edema [ ] No edema  PULMONARY:  [ ]Tachypnea  [ ]Audible excessive secretions [X ] No labored breathing [ ] labored breathing  GASTROINTESTINAL: [ ]Soft  [ ]Distended  [ X]Not distended [ ]Non tender [ ]Tender  MUSCULOSKELETAL: [ ]No clubbing [ ] clubbing  [ X] No cyanosis [ ] cyanosis  NEUROLOGIC: [ ]No focal deficits  [ ]Follows commands  [ ]Does not follow commands  [X ]Cognitive impairment  [ ]Dysphagia  [ ]Dysarthria  [ ]Paresis   SKIN: [ ] Jaundiced [X ] Non-jaundiced [ ]Rash [ ]No Rash [ ] Warm [ ] Dry  MISC/LINES: [ ] ET tube [ ] Trach [ ]NGT/OGT [ ]PEG [ ]Madsen    CRITICAL CARE:  [ ] Shock Present  [ ]Septic [ ]Cardiogenic [ ]Neurologic [ ]Hypovolemic  [ ]  Vasopressors [ ]  Inotropes   [ ]Respiratory failure present [ ]Mechanical ventilation [ ]Non-invasive ventilatory support [ ]High flow  [ ]Acute  [ ]Chronic [ ]Hypoxic  [ ]Hypercarbic [ ]Other  [ ]Other organ failure     LABS: reviewed by me                          7.8    16.16 )-----------( 526      ( 15 Mar 2024 11:43 )             26.3   03-14    134<L>  |  95<L>  |  15  ----------------------------<  201<H>  3.7   |  30  |  0.6<L>    Ca    9.3      14 Mar 2024 08:21  Mg     2.2     03-14        RADIOLOGY & ADDITIONAL STUDIES: reviewed by m    CXR 2/5/24    Support devices: Enteric tube satisfactory position.    Cardiac/ Mediastinum: unremarkable    Lungs/ Pleura: Diminishing left lung opacity/effusion. Stable smaller   right basilar opacity. No pneumothorax.    Skeletal/ soft tissues: Stable    PROTEIN CALORIE MALNUTRITION PRESENT: [ ]mild [ ]moderate [ ]severe [ ]underweight [ ]morbid obesity  https://www.andeal.org/vault/2440/web/files/ONC/Table_Clinical%20Characteristics%20to%20Document%20Malnutrition-White%20JV%20et%20al%202012.pdf    Height (cm): 136.4 (01-24-24 @ 09:52), 154.9 (11-17-23 @ 15:00), 145 (10-02-23 @ 12:00)  Weight (kg): 52.3 (01-21-24 @ 08:00), 60 (11-17-23 @ 15:00), 55.3 (10-02-23 @ 12:00)  BMI (kg/m2): 28.1 (01-24-24 @ 09:52), 21.8 (01-21-24 @ 08:00), 25 (11-17-23 @ 15:00)    [ ]PPSV2 < or = to 30% [ ]significant weight loss  [ ]poor nutritional intake  [ ]anasarca      [ ]Artificial Nutrition      Palliative Care Spiritual/Emotional Screening Tool Question  Severity (0-4):                    OR                    [X ] Unable to determine/NA  Score of 2 or greater indicates recommendation of Chaplaincy referral  Chaplaincy Referral: [ ] Yes [ ] Refused [ ] Following     Caregiver [place]:  [ ] Yes [ ] No    OR    [x ] Unable to determine. Will assess at later time if appropriate.  Social Work Referral [ ]  Patient and Family Centered Care Referral [ ]    Anticipatory Grief Present: [ ] Yes [ ] No    OR     [ x] Unable to determine. Will assess at later time if appropriate.  Social Work Referral [ ]  Patient and Family Centered Care Referral [ ]    REFERRALS:   [ ]Chaplaincy  [ ]Hospice  [ ]Child Life  [ ]Social Work  [ ]Case management [ ]Holistic Therapy     Palliative care education provided to patient and/or family    Goals of Care Document:     ______________  Wu Jenkins MD  Palliative Medicine  [facility] [place] [facility]   of Geriatric and Palliative Medicine  [phone]

## 2024-03-15 NOTE — PROGRESS NOTE ADULT - ASSESSMENT
IMPRESSION:    Acute hypoxemic respiratory failure on HHFNC/ NRM  PNA s/p ABx  Sepsis POA  Anemia  Septic shock, off Levophed   Recurrent aspiration pneumonia / prior intubation  Elevated fungitell s/p Caspo  HO DVT on Eliquis   HO GI bleed  HO OM  HO recent duodenal perforation   HO polymicrobial bacteremia   H/o CP, DS  H/o seizures    PLAN:    CNS:  Avoid CNS Depressant, AED. MS at baseline.    HEENT: Oral care. Eye drops.    PULMONARY: HOB at 45 degrees.  Aspiration precaution. Wean FiO2 Keep spo2 92 TO 96%. CHEST PT. Nebs q6 .    CARDIOVASCULAR: Keep MAP more than 60. Avoid overload. C/w midodrine.    GI: Protonix. NG feeding. Consent for PEG    INFECTIOUS DISEASE:  ABX and Anti fungal per ID. Septic w/u. Repeat MRSA. F/u repeat fungitell.     HEMATOLOGICAL:  Lovenox therapeutic.  Monitor CBC.    ENDOCRINE:  Follow up FS.  Insulin protocol if needed.    MUSCULOSKELETAL: Bedrest.  Off loading.  Wound care.    Prognosis very poor.  Palliative care following   dnr/i   Aggressive care is futile at this point

## 2024-03-15 NOTE — PROGRESS NOTE ADULT - ASSESSMENT
ASSESSMENT  58F w/ PMHx Down Syndrome, nonverbal at baseline, Hypothyroidism, Cerebral palsy and Seizure Disorders presents to the ED from nursing facility/group home presented to ED for respiratory distress and high grade fever.     IMPRESSION  #Staphylococcus epidermidis, Methicillin resistant: Detec    3/13 BCX + , has PICC  #Hypoxemia    3/13 CT Chest Extensive patchy  left-sided pulmonary opacities, slightly decreased from   prior CT. Patchy right-sided opacities also demonstrated. Findings are   likely infectious in etiology. Recommend follow-up.    CXR 3/13 Bilateral interstitial densities may reflect vascular congestion.  #HAP / aspiration with cavitary PNA  < from: CT Chest w/ IV Cont (02.21.24 @ 00:09) >  Bilateral pneumonia, left worse than right, with a left upper lobe   thick-walled cavity. Diffuse opacification of the   left lung is seen with what appears to be some mucus plugging centrally   within the left mainstem bronchus. There is a pleural effusion.   Reactive mediastinal   lymph nodes are seen.    MRSA PCR Result.: Negative (02-20-24 @ 13:42)    Procalcitonin, Serum: 0.16 (02-19-24 @ 16:00)- unremarkable     2/17 BCX NGTD     Rapid RVP Result: NotDetec (02-17-24 @ 19:06)    2/12 BCX NGTD    Procalcitonin, Serum: 0.10 (02-12-24 @ 11:17)    Rapid RVP Result: NotDetec (02-12-24 @ 10:28)    MRSA PCR Result.: Negative (02-12-24 @ 10:28)    2/9 BCX NGTD x2    2/3 BCX NGTD     2/1 BCX NGTD     2/1 UCX   >=3 organisms. Probable collection contamination.    1/31 BCX NG    Rapid RVP Result: NotDetec (02-04-24 @ 10:28)    MRSA PCR Result.: Negative (02-03-24 @ 21:32)     UA without significant pyuria   Procalcitonin, Serum: 0.22 (02-01-24 @ 16:00)--> Procalcitonin, Serum: 0.15 (02-03-24 @ 11:13), downtrending ; unremarkable   MRSA PCR Result.: Negative (01-22-24 @ 05:30)  < from: Xray Chest 1 View-PORTABLE IMMEDIATE (Xray Chest 1 View-PORTABLE IMMEDIATE .) (02.01.24 @ 03:02) >  Bibasal opacities without significant change.    quantiferon gold negative  #Acute hypoxic respiratory failure- Gram Negative pneumonia   #1/20 BCX 1/4 bottles : Staphylococcus hominis- contaminant   Repeat CX NG   #Severe Sepsis on admission  #RSV + 1/21  #Elevated fungitell   serum aspergillus galactomannan and histo are (-), not at risk of PCP  Fungitell: 76 (02-19-24 @ 16:00)  Fungitell: 63 (02-06-24 @ 11:27)  Fungitell: 325 (01-20-24 @ 21:23)  Fungitell: 138 (11-07-23 @ 08:08)  Fungitell: 115 (11-02-23 @ 11:40)  Fungitell: >500 pg/mL (10.17.23 @ 17:20) s/p empiric caspo   #Full thickness ulcer right heel- Appreciate burn/podiatry evaluation.  #History of Right planter foot ulcers - two full thickness ulcers - serous drainage with mild erythema with OM  - MR Foot No Cont, Right (10.16.23 @ 21:51): IMPRESSION: 1.  Limited exam. 2.  Osteomyelitis of the first metatarsal stump. 3.  Osteomyelitis of the second toe distal phalanx.  - s/p excidional debridement to and including bone 1st metatarsal with partial 2nd digit amputation - 1st metatarsal head resected - Wound Cx Proteus ESBL   #CKD 2-3 Creatinine: 0.7 mg/dL (02.02.24 @ 04:59)  #History of buttock ulcer  #Down syndrome/Cerebral Palsy     RECOMMENDATIONS  - Remove midline as + BCX coNS , may be a true pathogen and patient critically ill   - Repeat BCX  - expanded RVP   - midline 2/26  - Vanc dosing AUC/ZANE per clinical pharmacist   - fungitell   - Sputum cx if possible  - Continue Meropenem 1g q8h IV  - Continue Levaquin 750mg q24h IV , monitor QTC   - Doubt fungal in nature , fungal workup previously negative, f/u fungitell   - Grave prognosis, aggressive care is futile    If any questions, please send a message or call on [employer] Teams  Please continue to update ID with any pertinent new laboratory or radiographic findings.

## 2024-03-16 LAB
ALBUMIN SERPL ELPH-MCNC: 3 G/DL — LOW (ref 3.5–5.2)
ALP SERPL-CCNC: 88 U/L — SIGNIFICANT CHANGE UP (ref 30–115)
ALT FLD-CCNC: 13 U/L — SIGNIFICANT CHANGE UP (ref 0–41)
ANION GAP SERPL CALC-SCNC: 10 MMOL/L — SIGNIFICANT CHANGE UP (ref 7–14)
AST SERPL-CCNC: 14 U/L — SIGNIFICANT CHANGE UP (ref 0–41)
BASOPHILS # BLD AUTO: 0.01 K/UL — SIGNIFICANT CHANGE UP (ref 0–0.2)
BASOPHILS NFR BLD AUTO: 0.1 % — SIGNIFICANT CHANGE UP (ref 0–1)
BILIRUB SERPL-MCNC: <0.2 MG/DL — SIGNIFICANT CHANGE UP (ref 0.2–1.2)
BUN SERPL-MCNC: 21 MG/DL — HIGH (ref 10–20)
CALCIUM SERPL-MCNC: 9.8 MG/DL — SIGNIFICANT CHANGE UP (ref 8.4–10.5)
CHLORIDE SERPL-SCNC: 97 MMOL/L — LOW (ref 98–110)
CO2 SERPL-SCNC: 30 MMOL/L — SIGNIFICANT CHANGE UP (ref 17–32)
CREAT SERPL-MCNC: 0.6 MG/DL — LOW (ref 0.7–1.5)
EGFR: 105 ML/MIN/1.73M2 — SIGNIFICANT CHANGE UP
EOSINOPHIL # BLD AUTO: 0 K/UL — SIGNIFICANT CHANGE UP (ref 0–0.7)
EOSINOPHIL NFR BLD AUTO: 0 % — SIGNIFICANT CHANGE UP (ref 0–8)
GLUCOSE BLDC GLUCOMTR-MCNC: 100 MG/DL — HIGH (ref 70–99)
GLUCOSE BLDC GLUCOMTR-MCNC: 103 MG/DL — HIGH (ref 70–99)
GLUCOSE BLDC GLUCOMTR-MCNC: 94 MG/DL — SIGNIFICANT CHANGE UP (ref 70–99)
GLUCOSE BLDC GLUCOMTR-MCNC: 99 MG/DL — SIGNIFICANT CHANGE UP (ref 70–99)
GLUCOSE SERPL-MCNC: 90 MG/DL — SIGNIFICANT CHANGE UP (ref 70–99)
HCT VFR BLD CALC: 27.8 % — LOW (ref 37–47)
HGB BLD-MCNC: 8.3 G/DL — LOW (ref 12–16)
IMM GRANULOCYTES NFR BLD AUTO: 0.3 % — SIGNIFICANT CHANGE UP (ref 0.1–0.3)
LEVETIRACETAM SERPL-MCNC: 45.2 UG/ML — HIGH (ref 10–40)
LYMPHOCYTES # BLD AUTO: 18.2 % — LOW (ref 20.5–51.1)
LYMPHOCYTES # BLD AUTO: 2.24 K/UL — SIGNIFICANT CHANGE UP (ref 1.2–3.4)
MCHC RBC-ENTMCNC: 23.5 PG — LOW (ref 27–31)
MCHC RBC-ENTMCNC: 29.9 G/DL — LOW (ref 32–37)
MCV RBC AUTO: 78.8 FL — LOW (ref 81–99)
MONOCYTES # BLD AUTO: 0.76 K/UL — HIGH (ref 0.1–0.6)
MONOCYTES NFR BLD AUTO: 6.2 % — SIGNIFICANT CHANGE UP (ref 1.7–9.3)
NEUTROPHILS # BLD AUTO: 9.28 K/UL — HIGH (ref 1.4–6.5)
NEUTROPHILS NFR BLD AUTO: 75.2 % — SIGNIFICANT CHANGE UP (ref 42.2–75.2)
NRBC # BLD: 0 /100 WBCS — SIGNIFICANT CHANGE UP (ref 0–0)
PLATELET # BLD AUTO: 547 K/UL — HIGH (ref 130–400)
PMV BLD: 9.6 FL — SIGNIFICANT CHANGE UP (ref 7.4–10.4)
POTASSIUM SERPL-MCNC: 4.1 MMOL/L — SIGNIFICANT CHANGE UP (ref 3.5–5)
POTASSIUM SERPL-SCNC: 4.1 MMOL/L — SIGNIFICANT CHANGE UP (ref 3.5–5)
PROT SERPL-MCNC: 7.2 G/DL — SIGNIFICANT CHANGE UP (ref 6–8)
RBC # BLD: 3.53 M/UL — LOW (ref 4.2–5.4)
RBC # FLD: 19.5 % — HIGH (ref 11.5–14.5)
SODIUM SERPL-SCNC: 137 MMOL/L — SIGNIFICANT CHANGE UP (ref 135–146)
WBC # BLD: 12.33 K/UL — HIGH (ref 4.8–10.8)
WBC # FLD AUTO: 12.33 K/UL — HIGH (ref 4.8–10.8)

## 2024-03-16 PROCEDURE — 99233 SBSQ HOSP IP/OBS HIGH 50: CPT

## 2024-03-16 RX ADMIN — Medication 3 MILLILITER(S): at 14:02

## 2024-03-16 RX ADMIN — Medication 4 MILLILITER(S): at 20:06

## 2024-03-16 RX ADMIN — GABAPENTIN 300 MILLIGRAM(S): 400 CAPSULE ORAL at 18:20

## 2024-03-16 RX ADMIN — MEROPENEM 100 MILLIGRAM(S): 1 INJECTION INTRAVENOUS at 04:00

## 2024-03-16 RX ADMIN — Medication 1 APPLICATION(S): at 15:00

## 2024-03-16 RX ADMIN — MEROPENEM 100 MILLIGRAM(S): 1 INJECTION INTRAVENOUS at 15:00

## 2024-03-16 RX ADMIN — PANTOPRAZOLE SODIUM 40 MILLIGRAM(S): 20 TABLET, DELAYED RELEASE ORAL at 05:22

## 2024-03-16 RX ADMIN — CHLORHEXIDINE GLUCONATE 1 APPLICATION(S): 213 SOLUTION TOPICAL at 13:03

## 2024-03-16 RX ADMIN — CHLORHEXIDINE GLUCONATE 1 APPLICATION(S): 213 SOLUTION TOPICAL at 13:02

## 2024-03-16 RX ADMIN — RALOXIFENE HYDROCHLORIDE 60 MILLIGRAM(S): 60 TABLET, COATED ORAL at 17:39

## 2024-03-16 RX ADMIN — Medication 250 MILLIGRAM(S): at 05:23

## 2024-03-16 RX ADMIN — Medication 1 TABLET(S): at 18:20

## 2024-03-16 RX ADMIN — Medication 3 MILLILITER(S): at 08:48

## 2024-03-16 RX ADMIN — Medication 4 MILLILITER(S): at 14:02

## 2024-03-16 RX ADMIN — Medication 2 SPRAY(S): at 16:00

## 2024-03-16 RX ADMIN — Medication 250 MILLIGRAM(S): at 18:22

## 2024-03-16 RX ADMIN — Medication 2 SPRAY(S): at 21:20

## 2024-03-16 RX ADMIN — MEROPENEM 100 MILLIGRAM(S): 1 INJECTION INTRAVENOUS at 20:38

## 2024-03-16 RX ADMIN — Medication 4 MILLILITER(S): at 08:48

## 2024-03-16 RX ADMIN — ENOXAPARIN SODIUM 50 MILLIGRAM(S): 100 INJECTION SUBCUTANEOUS at 18:20

## 2024-03-16 RX ADMIN — Medication 1 DROP(S): at 18:19

## 2024-03-16 RX ADMIN — Medication 3 MILLILITER(S): at 20:06

## 2024-03-16 RX ADMIN — LEVETIRACETAM 400 MILLIGRAM(S): 250 TABLET, FILM COATED ORAL at 18:21

## 2024-03-16 RX ADMIN — Medication 1 APPLICATION(S): at 18:21

## 2024-03-16 NOTE — PROGRESS NOTE ADULT - SUBJECTIVE AND OBJECTIVE BOX
1Patient is a 58y old  Female who presents with a chief complaint of Respiratory Distress (15 Mar 2024 18:28)        Over Night Events:    No events overnight. On [facility] 40/40.    ROS:  See HPI    PHYSICAL EXAM    ICU Vital Signs Last 24 Hrs  T(C): 36.1 (16 Mar 2024 07:12), Max: 37.1 (15 Mar 2024 16:02)  T(F): 97 (16 Mar 2024 07:12), Max: 98.8 (15 Mar 2024 16:02)  HR: 74 (16 Mar 2024 07:12) (69 - 169)  BP: 116/60 (16 Mar 2024 07:12) (103/55 - 129/76)  BP(mean): 97 (15 Mar 2024 20:30) (93 - 97)  ABP: --  ABP(mean): --  RR: 19 (16 Mar 2024 07:12) (18 - 19)  SpO2: 97% (16 Mar 2024 07:12) (96% - 100%)    O2 Parameters below as of 15 Mar 2024 20:30  Patient On (Oxygen Delivery Method): nasal cannula, high flow  O2 Flow (L/min): 50          03-15-24 @ 07:01  -  03-16-24 @ 07:00  --------------------------------------------------------  IN:  Total IN: 0 mL    OUT:    Voided (mL): 500 mL  Total OUT: 500 mL    Total NET: -500 mL            CONSTITUTIONAL:  in  NAD    ENT:   Airway patent,   No thrush    EYES:   Clear bilaterally,   pupils equal,   round and reactive to light.    CARDIAC:   Normal rate,   regular rhythm.    no edema      CAROTID:   normal systolic impulse  no bruits    RESPIRATORY:   No wheezing  Normal chest expansion  Not tachypneic,  No use of accessory muscles    GASTROINTESTINAL:  Abdomen soft,   non-tender,   no guarding,   + BS    MUSCULOSKELETAL:   range of motion is not limited,  no clubbing, cyanosis    NEUROLOGICAL:   Alert    no motor deficits.        LABS:                            8.3    12.33 )-----------( 547      ( 16 Mar 2024 05:37 )             27.8                                               03-16    137  |  97<L>  |  21<H>  ----------------------------<  90  4.1   |  30  |  0.6<L>    Ca    9.8      16 Mar 2024 05:37    TPro  7.2  /  Alb  3.0<L>  /  TBili  <0.2  /  DBili  x   /  AST  14  /  ALT  13  /  AlkPhos  88  03-16                                             Urinalysis Basic - ( 16 Mar 2024 05:37 )    Color: x / Appearance: x / SG: x / pH: x  Gluc: 90 mg/dL / Ketone: x  / Bili: x / Urobili: x   Blood: x / Protein: x / Nitrite: x   Leuk Esterase: x / RBC: x / WBC x   Sq Epi: x / Non Sq Epi: x / Bacteria: x                                                  LIVER FUNCTIONS - ( 16 Mar 2024 05:37 )  Alb: 3.0 g/dL / Pro: 7.2 g/dL / ALK PHOS: 88 U/L / ALT: 13 U/L / AST: 14 U/L / GGT: x                    D-Dimer Assay, Quantitative: 397 ng/mL DDU (03-13-24 @ 07:08)  Procalcitonin, Serum: 0.19 ng/mL (03-13-24 @ 07:08)                    POCT Blood Glucose.: 100 mg/dL (03-16-24 @ 08:05)  POCT Blood Glucose.: 119 mg/dL (03-15-24 @ 21:44)                                                                                                           MEDICATIONS  (STANDING):  acetylcysteine 20%  Inhalation 4 milliLiter(s) Inhalation every 6 hours  albuterol/ipratropium for Nebulization 3 milliLiter(s) Nebulizer every 6 hours  AQUAPHOR (petrolatum Ointment) 1 Application(s) Topical two times a day  artificial  tears Solution 1 Drop(s) Both EYES two times a day  calcium carbonate   1250 mG (OsCal) 1 Tablet(s) Oral two times a day  chlorhexidine 2% Cloths 1 Application(s) Topical daily  chlorhexidine 2% Cloths 1 Application(s) Topical daily  enoxaparin Injectable 50 milliGRAM(s) SubCutaneous every 12 hours  erythromycin   Ointment 1 Application(s) Both EYES daily  gabapentin 300 milliGRAM(s) Oral every 12 hours  levETIRAcetam  IVPB 500 milliGRAM(s) IV Intermittent two times a day  levoFLOXacin IVPB 750 milliGRAM(s) IV Intermittent every 24 hours  levoFLOXacin IVPB      melatonin 3 milliGRAM(s) Oral at bedtime  meropenem  IVPB 1000 milliGRAM(s) IV Intermittent every 8 hours  methylPREDNISolone sodium succinate Injectable 40 milliGRAM(s) IV Push daily  midodrine. 20 milliGRAM(s) Oral three times a day  pantoprazole  Injectable 40 milliGRAM(s) IV Push every 24 hours  polyethylene glycol 3350 17 Gram(s) Oral at bedtime  raloxifene 60 milliGRAM(s) Oral daily  saccharomyces boulardii 250 milliGRAM(s) Oral two times a day  senna 2 Tablet(s) Oral at bedtime  sodium chloride 0.65% Nasal 2 Spray(s) Both Nostrils three times a day  sodium chloride 0.9% Bolus 500 milliLiter(s) IV Bolus once  vancomycin  IVPB 750 milliGRAM(s) IV Intermittent every 12 hours    MEDICATIONS  (PRN):  acetaminophen     Tablet .. 650 milliGRAM(s) Oral every 8 hours PRN Temp greater or equal to 38.5C (101.3F)  aluminum hydroxide/magnesium hydroxide/simethicone Suspension 30 milliLiter(s) Oral every 4 hours PRN Dyspepsia  ondansetron Injectable 4 milliGRAM(s) IV Push every 8 hours PRN Nausea and/or Vomiting      Xrays:                                                                                     ECHO     1Patient is a 58y old  Female who presents with a chief complaint of Respiratory Distress (15 Mar 2024 18:28)        Over Night Events:  On NC.      ROS:  See HPI    PHYSICAL EXAM    ICU Vital Signs Last 24 Hrs  T(C): 36.1 (16 Mar 2024 07:12), Max: 37.1 (15 Mar 2024 16:02)  T(F): 97 (16 Mar 2024 07:12), Max: 98.8 (15 Mar 2024 16:02)  HR: 74 (16 Mar 2024 07:12) (69 - 169)  BP: 116/60 (16 Mar 2024 07:12) (103/55 - 129/76)  BP(mean): 97 (15 Mar 2024 20:30) (93 - 97)  ABP: --  ABP(mean): --  RR: 19 (16 Mar 2024 07:12) (18 - 19)  SpO2: 97% (16 Mar 2024 07:12) (96% - 100%)    O2 Parameters below as of 15 Mar 2024 20:30  Patient On (Oxygen Delivery Method): nasal cannula, high flow  O2 Flow (L/min): 50          03-15-24 @ 07:01  -  03-16-24 @ 07:00  --------------------------------------------------------  IN:  Total IN: 0 mL    OUT:    Voided (mL): 500 mL  Total OUT: 500 mL    Total NET: -500 mL            CONSTITUTIONAL:  in  NAD    ENT:   Airway patent,   No thrush    EYES:   Clear bilaterally,   pupils equal,   round and reactive to light.    CARDIAC:   Normal rate,   regular rhythm.    no edema      CAROTID:   normal systolic impulse  no bruits    RESPIRATORY:   No wheezing  Normal chest expansion  Not tachypneic,  No use of accessory muscles    GASTROINTESTINAL:  Abdomen soft,   non-tender,   no guarding,   + BS    MUSCULOSKELETAL:   range of motion is not limited,  no clubbing, cyanosis    NEUROLOGICAL:   Alert          LABS:                            8.3    12.33 )-----------( 547      ( 16 Mar 2024 05:37 )             27.8                                               03-16    137  |  97<L>  |  21<H>  ----------------------------<  90  4.1   |  30  |  0.6<L>    Ca    9.8      16 Mar 2024 05:37    TPro  7.2  /  Alb  3.0<L>  /  TBili  <0.2  /  DBili  x   /  AST  14  /  ALT  13  /  AlkPhos  88  03-16                                             Urinalysis Basic - ( 16 Mar 2024 05:37 )    Color: x / Appearance: x / SG: x / pH: x  Gluc: 90 mg/dL / Ketone: x  / Bili: x / Urobili: x   Blood: x / Protein: x / Nitrite: x   Leuk Esterase: x / RBC: x / WBC x   Sq Epi: x / Non Sq Epi: x / Bacteria: x                                                  LIVER FUNCTIONS - ( 16 Mar 2024 05:37 )  Alb: 3.0 g/dL / Pro: 7.2 g/dL / ALK PHOS: 88 U/L / ALT: 13 U/L / AST: 14 U/L / GGT: x                    D-Dimer Assay, Quantitative: 397 ng/mL DDU (03-13-24 @ 07:08)  Procalcitonin, Serum: 0.19 ng/mL (03-13-24 @ 07:08)                    POCT Blood Glucose.: 100 mg/dL (03-16-24 @ 08:05)  POCT Blood Glucose.: 119 mg/dL (03-15-24 @ 21:44)                                                                                                           MEDICATIONS  (STANDING):  acetylcysteine 20%  Inhalation 4 milliLiter(s) Inhalation every 6 hours  albuterol/ipratropium for Nebulization 3 milliLiter(s) Nebulizer every 6 hours  AQUAPHOR (petrolatum Ointment) 1 Application(s) Topical two times a day  artificial  tears Solution 1 Drop(s) Both EYES two times a day  calcium carbonate   1250 mG (OsCal) 1 Tablet(s) Oral two times a day  chlorhexidine 2% Cloths 1 Application(s) Topical daily  chlorhexidine 2% Cloths 1 Application(s) Topical daily  enoxaparin Injectable 50 milliGRAM(s) SubCutaneous every 12 hours  erythromycin   Ointment 1 Application(s) Both EYES daily  gabapentin 300 milliGRAM(s) Oral every 12 hours  levETIRAcetam  IVPB 500 milliGRAM(s) IV Intermittent two times a day  levoFLOXacin IVPB 750 milliGRAM(s) IV Intermittent every 24 hours  levoFLOXacin IVPB      melatonin 3 milliGRAM(s) Oral at bedtime  meropenem  IVPB 1000 milliGRAM(s) IV Intermittent every 8 hours  methylPREDNISolone sodium succinate Injectable 40 milliGRAM(s) IV Push daily  midodrine. 20 milliGRAM(s) Oral three times a day  pantoprazole  Injectable 40 milliGRAM(s) IV Push every 24 hours  polyethylene glycol 3350 17 Gram(s) Oral at bedtime  raloxifene 60 milliGRAM(s) Oral daily  saccharomyces boulardii 250 milliGRAM(s) Oral two times a day  senna 2 Tablet(s) Oral at bedtime  sodium chloride 0.65% Nasal 2 Spray(s) Both Nostrils three times a day  sodium chloride 0.9% Bolus 500 milliLiter(s) IV Bolus once  vancomycin  IVPB 750 milliGRAM(s) IV Intermittent every 12 hours    MEDICATIONS  (PRN):  acetaminophen     Tablet .. 650 milliGRAM(s) Oral every 8 hours PRN Temp greater or equal to 38.5C (101.3F)  aluminum hydroxide/magnesium hydroxide/simethicone Suspension 30 milliLiter(s) Oral every 4 hours PRN Dyspepsia  ondansetron Injectable 4 milliGRAM(s) IV Push every 8 hours PRN Nausea and/or Vomiting      Xrays:                                                                                     ECHO

## 2024-03-16 NOTE — PROGRESS NOTE ADULT - ATTENDING COMMENTS
IMPRESSION:    Acute hypoxemic respiratory failure improving   PNA s/p ABx  Sepsis POA  Anemia  Septic shock, off Levophed   Recurrent aspiration pneumonia / prior intubation  Elevated fungitell s/p Caspo  HO DVT on Eliquis   HO GI bleed  HO OM  HO recent duodenal perforation   HO polymicrobial bacteremia   H/o CP, DS  H/o seizures    Plan as outlined above

## 2024-03-16 NOTE — PROGRESS NOTE ADULT - ASSESSMENT
58F w/ PMHx Down Syndrome, nonverbal at baseline, Hypothyroidism, Cerebral palsy and Seizure Disorders presents to the ED from nursing facility/group home ([facility]) presented to ED for respiratory distress and high grade fever. Patient found to be septic on admission. Admitted to SDU for management of acute respiratory distress 2/2 CAP/Aspiration PNA (20 Jan 2024 18:22)  While pt was on the floor she developed dyspnea  consulted by pulmonary/critical care and was upgraded back to SDU.      A/P  # Sepsis POA / Acute hypoxic resp failure / RSV bronchiolitis /  H/o Dysphagia/ suspected aspiration    -  pt is on  high flow oxygen   - aggressive pulmonary toilet, chest PT Q 4 hours, aspiration precautions, frequent suction   - ID is following, recommendations noted:   - Remove midline as + BCX coNS , may be a true pathogen and patient critically ill   - Repeat BCX  - expanded RVP   - replace midline   - Vanc dosing AUC/ZANE per clinical pharmacist   - check  fungitell   - Collect Sputum cx if possible  - Continue Meropenem 1g q8h IV,  Levaquin 750mg q24h IV , monitor QTC   - Doubt fungal in nature , fungal workup previously negative, f/u fungitell   - Failed FEES,  NG tube for feedings and medications  - pulmonary is following  - c/w IV steroids   - Grave prognosis, aggressive care is futile  - consumer advisory board agreed for CMO ( paperwork in the  paper chart) , will start formal process to change the level of care to CMO      # Seizure Disorder  - c/w  Keppra   - seizure precautions  - keep Mg above 2.0     # H/o lower ext DVT   - c/w therapeutic Lovenox, Eliquis on dc    #  Hypothyroidism  - TSH 0.51    # Normocytic Anemia, anemia of chronic disease   - monitor H/H, keep Hb above 7.5     # Down Syndrome/  Cerebral Palsy/ functional quadriplegia   - supportive care  - prevent falls and aspiration   - failed speech and swallow, might  need  PEG ( see above)  - on Raloxifene     Palliative care team is following ,  pt is  DNR/DNI ( approved by [facility]) , will start formal process to  change the  level of care to CMO (   approved by Consumer Advisory Board [place] [employer], paperwork in the paper chart)

## 2024-03-16 NOTE — PROGRESS NOTE ADULT - SUBJECTIVE AND OBJECTIVE BOX
58F w/ PMHx Down Syndrome, nonverbal at baseline, Hypothyroidism, Cerebral palsy and Seizure Disorders presents to the ED from nursing facility/group home ([facility]) presented to ED for respiratory distress and high grade fever. Patient found to be septic on admission.Admitted to SDU for management of acute respiratory distress 2/2 CAP/Aspiration PNA (20 Jan 2024 18:22)  While pt was on the floor she developed dyspnea after swallow evaluation, was spiking high grade fever, consulted by pulmonary/critical care and was upgraded back to SDU.  Pt developed left lung white out, improved after aggressive chest PT, treated with Meropenem and caspofungin, ID is following, her overall prognosis is very poor, she might need trach and PEG in the nearest future , a meeting with facility staff and state took place on 2/14.   Pt completed course o Meropenem, Levofloxacin added on 2/15 ( pt spiked fever), Meropenem resumed on 2/18,  she was tapered off pressure support, requires  high flow oxygen for  a while in SDU  Hb dropped without acute blood loss, will  monitor closely ( pt was on full AC).   After prolonged and complicated hospital course pt was made DNR/DNI, consulted by palliative care, CMO was discussed with consumer advisory board [facility] in case if pt'll fail recommended treatement.   Eventually pt was  off pressure support, transitioned to NC, downgraded to the floor. She completed the course of Caspofungin and Abx clinically improved and was relatively stable for a while on medical floor  On  3/14 developed respiratory distress with high oxygen requirements, worsening leukocytosis and was upgraded back to step down unit.   Today pt looks more comfortable, awake, nonverbal.     PAST MEDICAL & SURGICAL HISTORY:  Down syndrome  Osteoporosis  Mild anemia  Neuropathy  S/P debridement  of R hip on 3/2/21      Vital Signs Last 24 Hrs  T(C): 36.7 (16 Mar 2024 15:52), Max: 36.8 (15 Mar 2024 20:30)  T(F): 98 (16 Mar 2024 15:52), Max: 98.2 (15 Mar 2024 20:30)  HR: 88 (16 Mar 2024 15:52) (68 - 169)  BP: 104/67 (16 Mar 2024 15:52) (103/55 - 129/76)  BP(mean): 97 (15 Mar 2024 20:30) (97 - 97)  RR: 19 (16 Mar 2024 15:52) (19 - 19)  SpO2: 89% (16 Mar 2024 15:52) (89% - 100%)    Parameters below as of 15 Mar 2024 20:30  Patient On (Oxygen Delivery Method): nasal cannula, high flow  O2 Flow (L/min): 50        PHYSICAL EXAM:  GENERAL:  NAD chronically ill appearing, mentally challenged   SKIN: No rashes or lesions  HEENT: Atraumatic. Normocephalic   NECK: Supple, No JVD.    PULMONARY: decreased breath sounds B/L, poor air entry   CVS: Normal S1, S2. Rate and Rhythm are regular.    ABDOMEN/GI: Soft, Nontender, Nondistended.  MSK:  contracted LE   NEUROLOGIC: does not follow commands, opens eyes spontaneously   PSYCH: Alert & oriented x 0    Consultant(s) Notes Reviewed:  [x ] YES  [ ] NO  Care Discussed with Consultants/Other Providers [ x] YES  [ ] NO    LABS:                          8.3    12.33 )-----------( 547      ( 16 Mar 2024 05:37 )             27.8   03-16    137  |  97<L>  |  21<H>  ----------------------------<  90  4.1   |  30  |  0.6<L>    Ca    9.8      16 Mar 2024 05:37    TPro  7.2  /  Alb  3.0<L>  /  TBili  <0.2  /  DBili  x   /  AST  14  /  ALT  13  /  AlkPhos  88  03-16      Culture - Blood (collected 25 Feb 2024 11:47)  Source: .Blood None  Preliminary Report (26 Feb 2024 20:02):    No growth at 24 hours    Culture - Blood (03.13.24 @ 10:00)   - Staphylococcus epidermidis, Methicillin resistant: Detec  Gram Stain:   Growth in anaerobic bottle: Gram Positive Cocci in Clusters  Specimen Source: .Blood Blood  Organism: Blood Culture PCR  Culture Results:   Growth in anaerobic bottle: Gram Positive Cocci in Clusters   Direct identification is available within approximately 3-5   hours either by Blood Panel Multiplexed PCR or Direct   MALDI-TOF. Details: [email or web address]  Organism Identification: Blood Culture PCR  Method Type: PCR    RADIOLOGY & ADDITIONAL TESTS:  Imaging or report Personally Reviewed:  [x] YES  [ ] NO  EKG reviewed: [x] YES  [ ] NO    < from: Xray Chest 1 View- PORTABLE-Routine (02.28.24 @ 05:32) >    Impression:    Bilateral opacifications, left greater than right, stable. Support   devices as described.    < end of copied text >  < from: CT Chest w/ IV Cont (02.21.24 @ 00:09) >  Impression:    Bilateral pneumonia, left worse than right, with a left upper lobe   thick-walled cavity.    Enteric catheter as described. Note that by the time that this report was   dictated, this issue had been tended to..    < end of copied text >  < from: CT Abdomen and Pelvis w/ IV Cont (02.21.24 @ 00:10) >    IMPRESSION:  1.  No CT evidence of an acuteabdominopelvic pathology.    See separately dictated CT chest report for intrathoracic findings.    < end of copied text >  < from: Xray Chest 1 View- PORTABLE-Routine (03.01.24 @ 06:58) >  impression:    EKG leads overlie thorax, obscuring anatomy.    Support devices: NG tube coursing below the left hemidiaphragm. The tip   is not included..    Cardiac/ Mediastinum: Stable    Lungs/ Pleura: Unchanged left greater than right opacities. No   pneumothorax    Skeletal/ soft tissues: Stable          < from: Xray Chest 1 View- PORTABLE-Routine (03.03.24 @ 06:11) >    IMPRESSION:    Unchanged left-sided opacities.      < from: Xray Chest 1 View- PORTABLE-Routine (03.04.24 @ 06:01) >  Impression:    Bilateral opacifications. Support devices as described.    < from: CT Chest w/ IV Cont (03.13.24 @ 16:40) >  Impression:    Extensive patchy  left-sided pulmonary opacities, slightly decreased from   prior CT. Patchy right-sided opacities also demonstrated. Findings are   likely infectious in etiology. Recommend follow-up.    < end of copied text >      MEDICATIONS  (STANDING):  acetylcysteine 20%  Inhalation 4 milliLiter(s) Inhalation every 6 hours  albuterol/ipratropium for Nebulization 3 milliLiter(s) Nebulizer every 6 hours  AQUAPHOR (petrolatum Ointment) 1 Application(s) Topical two times a day  artificial  tears Solution 1 Drop(s) Both EYES two times a day  calcium carbonate   1250 mG (OsCal) 1 Tablet(s) Oral two times a day  chlorhexidine 2% Cloths 1 Application(s) Topical daily  chlorhexidine 2% Cloths 1 Application(s) Topical daily  enoxaparin Injectable 50 milliGRAM(s) SubCutaneous every 12 hours  erythromycin   Ointment 1 Application(s) Both EYES daily  gabapentin 300 milliGRAM(s) Oral every 12 hours  levETIRAcetam  IVPB 500 milliGRAM(s) IV Intermittent two times a day  levoFLOXacin IVPB      levoFLOXacin IVPB 750 milliGRAM(s) IV Intermittent every 24 hours  melatonin 3 milliGRAM(s) Oral at bedtime  meropenem  IVPB 1000 milliGRAM(s) IV Intermittent every 8 hours  methylPREDNISolone sodium succinate Injectable 40 milliGRAM(s) IV Push daily  midodrine. 20 milliGRAM(s) Oral three times a day  pantoprazole  Injectable 40 milliGRAM(s) IV Push every 24 hours  polyethylene glycol 3350 17 Gram(s) Oral at bedtime  raloxifene 60 milliGRAM(s) Oral daily  saccharomyces boulardii 250 milliGRAM(s) Oral two times a day  senna 2 Tablet(s) Oral at bedtime  sodium chloride 0.65% Nasal 2 Spray(s) Both Nostrils three times a day  sodium chloride 0.9% Bolus 500 milliLiter(s) IV Bolus once  vancomycin  IVPB 750 milliGRAM(s) IV Intermittent every 12 hours    MEDICATIONS  (PRN):  acetaminophen     Tablet .. 650 milliGRAM(s) Oral every 8 hours PRN Temp greater or equal to 38.5C (101.3F)  aluminum hydroxide/magnesium hydroxide/simethicone Suspension 30 milliLiter(s) Oral every 4 hours PRN Dyspepsia  ondansetron Injectable 4 milliGRAM(s) IV Push every 8 hours PRN Nausea and/or Vomiting

## 2024-03-16 NOTE — PROGRESS NOTE ADULT - ASSESSMENT
IMPRESSION:    Acute hypoxemic respiratory failure on HHFNC/ NRM  PNA s/p ABx  Sepsis POA  Anemia  Septic shock, off Levophed   Recurrent aspiration pneumonia / prior intubation  Elevated fungitell s/p Caspo  HO DVT on Eliquis   HO GI bleed  HO OM  HO recent duodenal perforation   HO polymicrobial bacteremia   H/o CP, DS  H/o seizures    PLAN:    CNS:  Avoid CNS Depressant, AED. MS at baseline.    HEENT: Oral care. Eye drops.    PULMONARY: HOB at 45 degrees.  Aspiration precaution. Wean FiO2 Keep spo2 92 TO 96%. CHEST PT. Nebs q6 .    CARDIOVASCULAR: Keep MAP more than 60. Avoid overload. C/w midodrine.    GI: Protonix. NG feeding. Consent for PEG    INFECTIOUS DISEASE:  ABX and Anti fungal per ID. Septic w/u. Repeat MRSA. F/u repeat fungitell. Remove midline, place new midline.    HEMATOLOGICAL:  Lovenox therapeutic.  Monitor CBC.    ENDOCRINE:  Follow up FS.  Insulin protocol if needed.    MUSCULOSKELETAL: Bedrest.  Off loading.  Wound care.    Prognosis very poor.  Palliative care following   dnr/i   Aggressive care is futile at this point IMPRESSION:    Acute hypoxemic respiratory failure improving   PNA s/p ABx  Sepsis POA  Anemia  Septic shock, off Levophed   Recurrent aspiration pneumonia / prior intubation  Elevated fungitell s/p Caspo  HO DVT on Eliquis   HO GI bleed  HO OM  HO recent duodenal perforation   HO polymicrobial bacteremia   H/o CP, DS  H/o seizures    PLAN:    CNS:  Avoid CNS Depressant, AED. MS at baseline.    HEENT: Oral care. Eye drops.    PULMONARY: HOB at 45 degrees.  Aspiration precaution. Wean FiO2 Keep spo2 92 TO 96%. CHEST PT. Nebs q6 .    CARDIOVASCULAR: Keep MAP more than 60. Avoid overload. C/w midodrine.    GI: Protonix. NG feeding. Consent for PEG    INFECTIOUS DISEASE:  ABX and Anti fungal per ID. Septic w/u. Repeat MRSA. F/u repeat fungitell. Remove midline, place new midline.    HEMATOLOGICAL:  Lovenox therapeutic.  Monitor CBC.    ENDOCRINE:  Follow up FS.  Insulin protocol if needed.    MUSCULOSKELETAL: Bedrest.  Off loading.  Wound care.    Prognosis very poor.  Palliative care following   dnr/i   Aggressive care is futile at this point  DGTF

## 2024-03-17 LAB
ALBUMIN SERPL ELPH-MCNC: 3.1 G/DL — LOW (ref 3.5–5.2)
ALP SERPL-CCNC: 88 U/L — SIGNIFICANT CHANGE UP (ref 30–115)
ALT FLD-CCNC: 12 U/L — SIGNIFICANT CHANGE UP (ref 0–41)
ANION GAP SERPL CALC-SCNC: 15 MMOL/L — HIGH (ref 7–14)
AST SERPL-CCNC: 20 U/L — SIGNIFICANT CHANGE UP (ref 0–41)
BASOPHILS # BLD AUTO: 0 K/UL — SIGNIFICANT CHANGE UP (ref 0–0.2)
BASOPHILS NFR BLD AUTO: 0 % — SIGNIFICANT CHANGE UP (ref 0–1)
BILIRUB SERPL-MCNC: 0.2 MG/DL — SIGNIFICANT CHANGE UP (ref 0.2–1.2)
BUN SERPL-MCNC: 16 MG/DL — SIGNIFICANT CHANGE UP (ref 10–20)
CALCIUM SERPL-MCNC: 9.6 MG/DL — SIGNIFICANT CHANGE UP (ref 8.4–10.4)
CHLORIDE SERPL-SCNC: 97 MMOL/L — LOW (ref 98–110)
CO2 SERPL-SCNC: 26 MMOL/L — SIGNIFICANT CHANGE UP (ref 17–32)
CREAT SERPL-MCNC: 0.5 MG/DL — LOW (ref 0.7–1.5)
EGFR: 109 ML/MIN/1.73M2 — SIGNIFICANT CHANGE UP
EOSINOPHIL # BLD AUTO: 0.08 K/UL — SIGNIFICANT CHANGE UP (ref 0–0.7)
EOSINOPHIL NFR BLD AUTO: 1.3 % — SIGNIFICANT CHANGE UP (ref 0–8)
GLUCOSE BLDC GLUCOMTR-MCNC: 133 MG/DL — HIGH (ref 70–99)
GLUCOSE BLDC GLUCOMTR-MCNC: 137 MG/DL — HIGH (ref 70–99)
GLUCOSE BLDC GLUCOMTR-MCNC: 141 MG/DL — HIGH (ref 70–99)
GLUCOSE BLDC GLUCOMTR-MCNC: 170 MG/DL — HIGH (ref 70–99)
GLUCOSE SERPL-MCNC: 73 MG/DL — SIGNIFICANT CHANGE UP (ref 70–99)
HCT VFR BLD CALC: 30.8 % — LOW (ref 37–47)
HGB BLD-MCNC: 9.3 G/DL — LOW (ref 12–16)
IMM GRANULOCYTES NFR BLD AUTO: 0.2 % — SIGNIFICANT CHANGE UP (ref 0.1–0.3)
LYMPHOCYTES # BLD AUTO: 2.14 K/UL — SIGNIFICANT CHANGE UP (ref 1.2–3.4)
LYMPHOCYTES # BLD AUTO: 34.3 % — SIGNIFICANT CHANGE UP (ref 20.5–51.1)
MCHC RBC-ENTMCNC: 23.3 PG — LOW (ref 27–31)
MCHC RBC-ENTMCNC: 30.2 G/DL — LOW (ref 32–37)
MCV RBC AUTO: 77 FL — LOW (ref 81–99)
MONOCYTES # BLD AUTO: 0.91 K/UL — HIGH (ref 0.1–0.6)
MONOCYTES NFR BLD AUTO: 14.6 % — HIGH (ref 1.7–9.3)
MRSA PCR RESULT.: NEGATIVE — SIGNIFICANT CHANGE UP
NEUTROPHILS # BLD AUTO: 3.09 K/UL — SIGNIFICANT CHANGE UP (ref 1.4–6.5)
NEUTROPHILS NFR BLD AUTO: 49.6 % — SIGNIFICANT CHANGE UP (ref 42.2–75.2)
NRBC # BLD: 0 /100 WBCS — SIGNIFICANT CHANGE UP (ref 0–0)
PLATELET # BLD AUTO: 433 K/UL — HIGH (ref 130–400)
PMV BLD: 9.7 FL — SIGNIFICANT CHANGE UP (ref 7.4–10.4)
POTASSIUM SERPL-MCNC: 4.2 MMOL/L — SIGNIFICANT CHANGE UP (ref 3.5–5)
POTASSIUM SERPL-SCNC: 4.2 MMOL/L — SIGNIFICANT CHANGE UP (ref 3.5–5)
PROT SERPL-MCNC: 6.9 G/DL — SIGNIFICANT CHANGE UP (ref 6–8)
RBC # BLD: 4 M/UL — LOW (ref 4.2–5.4)
RBC # FLD: 19.5 % — HIGH (ref 11.5–14.5)
SODIUM SERPL-SCNC: 138 MMOL/L — SIGNIFICANT CHANGE UP (ref 135–146)
WBC # BLD: 6.23 K/UL — SIGNIFICANT CHANGE UP (ref 4.8–10.8)
WBC # FLD AUTO: 6.23 K/UL — SIGNIFICANT CHANGE UP (ref 4.8–10.8)

## 2024-03-17 PROCEDURE — 99233 SBSQ HOSP IP/OBS HIGH 50: CPT

## 2024-03-17 PROCEDURE — 71045 X-RAY EXAM CHEST 1 VIEW: CPT | Mod: 26,76

## 2024-03-17 RX ADMIN — Medication 3 MILLILITER(S): at 07:39

## 2024-03-17 RX ADMIN — Medication 4 MILLILITER(S): at 07:40

## 2024-03-17 RX ADMIN — POLYETHYLENE GLYCOL 3350 17 GRAM(S): 17 POWDER, FOR SOLUTION ORAL at 21:08

## 2024-03-17 RX ADMIN — Medication 3 MILLILITER(S): at 19:20

## 2024-03-17 RX ADMIN — Medication 1 APPLICATION(S): at 12:42

## 2024-03-17 RX ADMIN — Medication 2 SPRAY(S): at 18:27

## 2024-03-17 RX ADMIN — Medication 4 MILLILITER(S): at 19:20

## 2024-03-17 RX ADMIN — Medication 1 DROP(S): at 06:43

## 2024-03-17 RX ADMIN — Medication 1 DROP(S): at 18:32

## 2024-03-17 RX ADMIN — PANTOPRAZOLE SODIUM 40 MILLIGRAM(S): 20 TABLET, DELAYED RELEASE ORAL at 05:41

## 2024-03-17 RX ADMIN — CHLORHEXIDINE GLUCONATE 1 APPLICATION(S): 213 SOLUTION TOPICAL at 12:42

## 2024-03-17 RX ADMIN — Medication 40 MILLIGRAM(S): at 05:41

## 2024-03-17 RX ADMIN — Medication 1 APPLICATION(S): at 18:33

## 2024-03-17 RX ADMIN — Medication 1 APPLICATION(S): at 05:43

## 2024-03-17 RX ADMIN — Medication 3 MILLIGRAM(S): at 21:08

## 2024-03-17 RX ADMIN — Medication 4 MILLILITER(S): at 14:21

## 2024-03-17 RX ADMIN — SENNA PLUS 2 TABLET(S): 8.6 TABLET ORAL at 21:08

## 2024-03-17 RX ADMIN — Medication 2 SPRAY(S): at 21:07

## 2024-03-17 RX ADMIN — MEROPENEM 100 MILLIGRAM(S): 1 INJECTION INTRAVENOUS at 03:37

## 2024-03-17 RX ADMIN — LEVETIRACETAM 400 MILLIGRAM(S): 250 TABLET, FILM COATED ORAL at 18:27

## 2024-03-17 RX ADMIN — LEVETIRACETAM 400 MILLIGRAM(S): 250 TABLET, FILM COATED ORAL at 05:42

## 2024-03-17 RX ADMIN — Medication 250 MILLIGRAM(S): at 05:42

## 2024-03-17 RX ADMIN — Medication 2 SPRAY(S): at 06:36

## 2024-03-17 RX ADMIN — ENOXAPARIN SODIUM 50 MILLIGRAM(S): 100 INJECTION SUBCUTANEOUS at 18:32

## 2024-03-17 RX ADMIN — Medication 3 MILLILITER(S): at 14:22

## 2024-03-17 RX ADMIN — ENOXAPARIN SODIUM 50 MILLIGRAM(S): 100 INJECTION SUBCUTANEOUS at 05:43

## 2024-03-17 RX ADMIN — Medication 250 MILLIGRAM(S): at 18:26

## 2024-03-17 RX ADMIN — MEROPENEM 100 MILLIGRAM(S): 1 INJECTION INTRAVENOUS at 20:32

## 2024-03-17 RX ADMIN — MEROPENEM 100 MILLIGRAM(S): 1 INJECTION INTRAVENOUS at 12:43

## 2024-03-17 NOTE — PROGRESS NOTE ADULT - SUBJECTIVE AND OBJECTIVE BOX
Patient is a 58y old  Female who presents with a chief complaint of Respiratory Distress (16 Mar 2024 16:07)        Over Night Events: Pt remains HFNC 40/40        ROS:     All ROS are negative except HPI         PHYSICAL EXAM    ICU Vital Signs Last 24 Hrs  T(C): 36.7 (17 Mar 2024 07:31), Max: 36.7 (16 Mar 2024 12:28)  T(F): 98 (17 Mar 2024 07:31), Max: 98 (16 Mar 2024 12:28)  HR: 69 (17 Mar 2024 07:31) (63 - 88)  BP: 127/52 (17 Mar 2024 07:31) (99/60 - 127/52)  BP(mean): --  ABP: --  ABP(mean): --  RR: 20 (17 Mar 2024 07:31) (19 - 20)  SpO2: 100% (17 Mar 2024 07:31) (89% - 100%)        CONSTITUTIONAL:  Ill appearing    CARDIAC:   Normal rate,   Regular rhythm.    No edema    RESPIRATORY:   BL rhonchi     GASTROINTESTINAL:  Abdomen soft,   Non-tender,   No guarding,    MUSCULOSKELETAL:   Range of motion is not limited,  No clubbing, cyanosis    NEUROLOGICAL:   does not follow commands    SKIN:   Skin normal color for race,   Warm and dry and intact.   No evidence of rash.    03-16-24 @ 07:01  -  03-17-24 @ 07:00  --------------------------------------------------------  IN:  Total IN: 0 mL    OUT:    Voided (mL): 500 mL  Total OUT: 500 mL    Total NET: -500 mL          LABS:                            9.3    6.23  )-----------( 433      ( 17 Mar 2024 05:08 )             30.8                                               03-17    138  |  97<L>  |  16  ----------------------------<  73  4.2   |  26  |  0.5<L>    Ca    9.6      17 Mar 2024 05:08    TPro  6.9  /  Alb  3.1<L>  /  TBili  0.2  /  DBili  x   /  AST  20  /  ALT  12  /  AlkPhos  88  03-17                                             Urinalysis Basic - ( 17 Mar 2024 05:08 )    Color: x / Appearance: x / SG: x / pH: x  Gluc: 73 mg/dL / Ketone: x  / Bili: x / Urobili: x   Blood: x / Protein: x / Nitrite: x   Leuk Esterase: x / RBC: x / WBC x   Sq Epi: x / Non Sq Epi: x / Bacteria: x                                                  LIVER FUNCTIONS - ( 17 Mar 2024 05:08 )  Alb: 3.1 g/dL / Pro: 6.9 g/dL / ALK PHOS: 88 U/L / ALT: 12 U/L / AST: 20 U/L / GGT: x                                                  Culture - Blood (collected 15 Mar 2024 11:43)  Source: .Blood None  Preliminary Report (16 Mar 2024 23:02):    No growth at 24 hours                                                                                           MEDICATIONS  (STANDING):  acetylcysteine 20%  Inhalation 4 milliLiter(s) Inhalation every 6 hours  albuterol/ipratropium for Nebulization 3 milliLiter(s) Nebulizer every 6 hours  AQUAPHOR (petrolatum Ointment) 1 Application(s) Topical two times a day  artificial  tears Solution 1 Drop(s) Both EYES two times a day  calcium carbonate   1250 mG (OsCal) 1 Tablet(s) Oral two times a day  chlorhexidine 2% Cloths 1 Application(s) Topical daily  chlorhexidine 2% Cloths 1 Application(s) Topical daily  enoxaparin Injectable 50 milliGRAM(s) SubCutaneous every 12 hours  erythromycin   Ointment 1 Application(s) Both EYES daily  gabapentin 300 milliGRAM(s) Oral every 12 hours  levETIRAcetam  IVPB 500 milliGRAM(s) IV Intermittent two times a day  levoFLOXacin IVPB 750 milliGRAM(s) IV Intermittent every 24 hours  levoFLOXacin IVPB      melatonin 3 milliGRAM(s) Oral at bedtime  meropenem  IVPB 1000 milliGRAM(s) IV Intermittent every 8 hours  methylPREDNISolone sodium succinate Injectable 40 milliGRAM(s) IV Push daily  midodrine. 20 milliGRAM(s) Oral three times a day  pantoprazole  Injectable 40 milliGRAM(s) IV Push every 24 hours  polyethylene glycol 3350 17 Gram(s) Oral at bedtime  raloxifene 60 milliGRAM(s) Oral daily  saccharomyces boulardii 250 milliGRAM(s) Oral two times a day  senna 2 Tablet(s) Oral at bedtime  sodium chloride 0.65% Nasal 2 Spray(s) Both Nostrils three times a day  sodium chloride 0.9% Bolus 500 milliLiter(s) IV Bolus once  vancomycin  IVPB 750 milliGRAM(s) IV Intermittent every 12 hours    MEDICATIONS  (PRN):  acetaminophen     Tablet .. 650 milliGRAM(s) Oral every 8 hours PRN Temp greater or equal to 38.5C (101.3F)  aluminum hydroxide/magnesium hydroxide/simethicone Suspension 30 milliLiter(s) Oral every 4 hours PRN Dyspepsia  ondansetron Injectable 4 milliGRAM(s) IV Push every 8 hours PRN Nausea and/or Vomiting      New X-rays reviewed:                                                                                  ECHO    CXR interpreted by me:

## 2024-03-17 NOTE — PROGRESS NOTE ADULT - ASSESSMENT
58F w/ PMHx Down Syndrome, nonverbal at baseline, Hypothyroidism, Cerebral palsy and Seizure Disorders presents to the ED from nursing facility/group home ([facility]) presented to ED for respiratory distress and high grade fever. Patient found to be septic on admission. Admitted to SDU for management of acute respiratory distress 2/2 CAP/Aspiration PNA (20 Jan 2024 18:22)  While pt was on the floor she developed dyspnea  consulted by pulmonary/critical care and was upgraded back to SDU.      A/P  # Sepsis POA / Acute hypoxic resp failure / RSV bronchiolitis /  H/o Dysphagia/ suspected aspiration    -  taper off   high flow oxygen   - aggressive pulmonary toilet, chest PT Q 4 hours, aspiration precautions, frequent suction   - ID is following, recommendations noted:   - Remove midline as + BCX coNS , may be a true pathogen and patient critically ill   - Repeat BCX  - expanded RVP   - replace midline   - Vanc dosing AUC/ZANE per clinical pharmacist   - check  fungitell   - Collect Sputum cx if possible  - Continue Meropenem 1g q8h IV,  Levaquin 750mg q24h IV , monitor QTC   - repeat EKG today   - Doubt fungal in nature , fungal workup previously negative, f/u fungitell   - Failed FEES,  NG tube for feedings and medications  - pulmonary is following  - c/w IV steroids   - Grave prognosis, aggressive care is futile  - consumer advisory board agreed for CMO ( paperwork in the  paper chart) , will start formal process to change the level of care to CMO, in case of official paperwork delay for CMO obtain consent from advisory board for PEG tube.       # Seizure Disorder  - c/w  Keppra   - seizure precautions  - keep Mg above 2.0     # H/o lower ext DVT   - c/w therapeutic Lovenox, Eliquis on dc    #  Hypothyroidism  - TSH 0.51    # Normocytic Anemia, anemia of chronic disease   - monitor H/H, keep Hb above 7.5     # Down Syndrome/  Cerebral Palsy/ functional quadriplegia   - supportive care  - prevent falls and aspiration   - failed speech and swallow, might  need  PEG ( see above)  - on Raloxifene     Palliative care team is following ,  pt is  DNR/DNI ( approved by [facility]) , will start formal process to  change the  level of care to CMO (   approved by Consumer Advisory Board [place] [employer], paperwork in the paper chart)    in case of official paperwork delay for CMO obtain consent from advisory board for PEG tube.     #Progress Note Handoff  Pending (specify): pulmonary toilet, taper off high flow oxygen, confirm Vanco dose with clinical pharmacy, supportive care , put NGT  for feedings and medications,  in case of official paperwork delay for CMO obtain consent from advisory board for PEG tube.    Family discussion: n/a  Disposition: DGTF when off high flow oxygen

## 2024-03-17 NOTE — PROGRESS NOTE ADULT - SUBJECTIVE AND OBJECTIVE BOX
58F w/ PMHx Down Syndrome, nonverbal at baseline, Hypothyroidism, Cerebral palsy and Seizure Disorders presents to the ED from nursing facility/group home ([facility]) presented to ED for respiratory distress and high grade fever. Patient found to be septic on admission.Admitted to SDU for management of acute respiratory distress 2/2 CAP/Aspiration PNA (20 Jan 2024 18:22)  While pt was on the floor she developed dyspnea after swallow evaluation, was spiking high grade fever, consulted by pulmonary/critical care and was upgraded back to SDU.  Pt developed left lung white out, improved after aggressive chest PT, treated with Meropenem and caspofungin, ID is following, her overall prognosis is very poor, she might need trach and PEG in the nearest future , a meeting with facility staff and state took place on 2/14.   Pt completed course o Meropenem, Levofloxacin added on 2/15 ( pt spiked fever), Meropenem resumed on 2/18,  she was tapered off pressure support, requires  high flow oxygen for  a while in SDU  Hb dropped without acute blood loss, will  monitor closely ( pt was on full AC).   After prolonged and complicated hospital course pt was made DNR/DNI, consulted by palliative care, CMO was discussed with consumer advisory board [facility] in case if pt'll fail recommended treatement.   Eventually pt was  off pressure support, transitioned to NC, downgraded to the floor. She completed the course of Caspofungin and Abx clinically improved and was relatively stable for a while on medical floor  On  3/14 developed respiratory distress with high oxygen requirements, worsening leukocytosis and was upgraded back to step down unit.   Pt clinically improved in last 24 hours, awake, comfortable, nonverbal, pulled out NGT.     PAST MEDICAL & SURGICAL HISTORY:  Down syndrome  Osteoporosis  Mild anemia  Neuropathy  S/P debridement  of R hip on 3/2/21      Vital Signs Last 24 Hrs  T(C): 36.6 (17 Mar 2024 11:10), Max: 36.7 (16 Mar 2024 15:52)  T(F): 97.9 (17 Mar 2024 11:10), Max: 98 (16 Mar 2024 15:52)  HR: 97 (17 Mar 2024 11:10) (63 - 97)  BP: 93/77 (17 Mar 2024 11:10) (93/77 - 127/52)  BP(mean): --  RR: 20 (17 Mar 2024 11:10) (19 - 20)  SpO2: 100% (17 Mar 2024 11:10) (89% - 100%)      PHYSICAL EXAM:  GENERAL:  NAD chronically ill appearing, mentally challenged   SKIN: No rashes or lesions  HEENT: Atraumatic. Normocephalic   NECK: Supple, No JVD.    PULMONARY: decreased breath sounds B/L, poor air entry   CVS: Normal S1, S2. Rate and Rhythm are regular.    ABDOMEN/GI: Soft, Nontender, Nondistended.  MSK:  contracted LE   NEUROLOGIC: does not follow commands, opens eyes spontaneously   PSYCH: Alert & oriented x 0    Consultant(s) Notes Reviewed:  [x ] YES  [ ] NO  Care Discussed with Consultants/Other Providers [ x] YES  [ ] NO    LABS:                          9.3    6.23  )-----------( 433      ( 17 Mar 2024 05:08 )             30.8   03-17    138  |  97<L>  |  16  ----------------------------<  73  4.2   |  26  |  0.5<L>    Ca    9.6      17 Mar 2024 05:08    TPro  6.9  /  Alb  3.1<L>  /  TBili  0.2  /  DBili  x   /  AST  20  /  ALT  12  /  AlkPhos  88  03-17        Culture - Blood (collected 25 Feb 2024 11:47)  Source: .Blood None  Preliminary Report (26 Feb 2024 20:02):    No growth at 24 hours    Culture - Blood (03.13.24 @ 10:00)   - Staphylococcus epidermidis, Methicillin resistant: Detec  Gram Stain:   Growth in anaerobic bottle: Gram Positive Cocci in Clusters  Specimen Source: .Blood Blood  Organism: Blood Culture PCR  Culture Results:   Growth in anaerobic bottle: Gram Positive Cocci in Clusters   Direct identification is available within approximately 3-5   hours either by Blood Panel Multiplexed PCR or Direct   MALDI-TOF. Details: [email or web address]  Organism Identification: Blood Culture PCR  Method Type: PCR    RADIOLOGY & ADDITIONAL TESTS:  Imaging or report Personally Reviewed:  [x] YES  [ ] NO  EKG reviewed: [x] YES  [ ] NO    < from: Xray Chest 1 View- PORTABLE-Routine (02.28.24 @ 05:32) >    Impression:    Bilateral opacifications, left greater than right, stable. Support   devices as described.    < end of copied text >  < from: CT Chest w/ IV Cont (02.21.24 @ 00:09) >  Impression:    Bilateral pneumonia, left worse than right, with a left upper lobe   thick-walled cavity.    Enteric catheter as described. Note that by the time that this report was   dictated, this issue had been tended to..    < end of copied text >  < from: CT Abdomen and Pelvis w/ IV Cont (02.21.24 @ 00:10) >    IMPRESSION:  1.  No CT evidence of an acuteabdominopelvic pathology.    See separately dictated CT chest report for intrathoracic findings.    < end of copied text >  < from: Xray Chest 1 View- PORTABLE-Routine (03.01.24 @ 06:58) >  impression:    EKG leads overlie thorax, obscuring anatomy.    Support devices: NG tube coursing below the left hemidiaphragm. The tip   is not included..    Cardiac/ Mediastinum: Stable    Lungs/ Pleura: Unchanged left greater than right opacities. No   pneumothorax    Skeletal/ soft tissues: Stable          < from: Xray Chest 1 View- PORTABLE-Routine (03.03.24 @ 06:11) >    IMPRESSION:    Unchanged left-sided opacities.      < from: Xray Chest 1 View- PORTABLE-Routine (03.04.24 @ 06:01) >  Impression:    Bilateral opacifications. Support devices as described.    < from: CT Chest w/ IV Cont (03.13.24 @ 16:40) >  Impression:    Extensive patchy  left-sided pulmonary opacities, slightly decreased from   prior CT. Patchy right-sided opacities also demonstrated. Findings are   likely infectious in etiology. Recommend follow-up.    < from: Xray Chest 1 View- PORTABLE-Urgent (Xray Chest 1 View- PORTABLE-Urgent .) (03.13.24 @ 23:21) >  IMPRESSION:    Diffuse bilateral patchy opacities, left greater than right.    Feeding tube coiled in nasopharynx and terminates in stomach.    < end of copied text >        MEDICATIONS  (STANDING):  acetylcysteine 20%  Inhalation 4 milliLiter(s) Inhalation every 6 hours  albuterol/ipratropium for Nebulization 3 milliLiter(s) Nebulizer every 6 hours  AQUAPHOR (petrolatum Ointment) 1 Application(s) Topical two times a day  artificial  tears Solution 1 Drop(s) Both EYES two times a day  calcium carbonate   1250 mG (OsCal) 1 Tablet(s) Oral two times a day  chlorhexidine 2% Cloths 1 Application(s) Topical daily  chlorhexidine 2% Cloths 1 Application(s) Topical daily  enoxaparin Injectable 50 milliGRAM(s) SubCutaneous every 12 hours  erythromycin   Ointment 1 Application(s) Both EYES daily  gabapentin 300 milliGRAM(s) Oral every 12 hours  levETIRAcetam  IVPB 500 milliGRAM(s) IV Intermittent two times a day  levoFLOXacin IVPB      levoFLOXacin IVPB 750 milliGRAM(s) IV Intermittent every 24 hours  melatonin 3 milliGRAM(s) Oral at bedtime  meropenem  IVPB 1000 milliGRAM(s) IV Intermittent every 8 hours  methylPREDNISolone sodium succinate Injectable 40 milliGRAM(s) IV Push daily  midodrine. 20 milliGRAM(s) Oral three times a day  pantoprazole  Injectable 40 milliGRAM(s) IV Push every 24 hours  polyethylene glycol 3350 17 Gram(s) Oral at bedtime  raloxifene 60 milliGRAM(s) Oral daily  saccharomyces boulardii 250 milliGRAM(s) Oral two times a day  senna 2 Tablet(s) Oral at bedtime  sodium chloride 0.65% Nasal 2 Spray(s) Both Nostrils three times a day  sodium chloride 0.9% Bolus 500 milliLiter(s) IV Bolus once  vancomycin  IVPB 750 milliGRAM(s) IV Intermittent every 12 hours    MEDICATIONS  (PRN):  acetaminophen     Tablet .. 650 milliGRAM(s) Oral every 8 hours PRN Temp greater or equal to 38.5C (101.3F)  aluminum hydroxide/magnesium hydroxide/simethicone Suspension 30 milliLiter(s) Oral every 4 hours PRN Dyspepsia  ondansetron Injectable 4 milliGRAM(s) IV Push every 8 hours PRN Nausea and/or Vomiting

## 2024-03-17 NOTE — CHART NOTE - NSCHARTNOTEFT_GEN_A_CORE
SICU  DOWN GRADE NOTE      Patient is a 58y old  Female who presents with a chief complaint of Respiratory Distress (17 Mar 2024 13:10)        58y old  Female who presents with a chief complaint of Respiratory Distress (04 Mar 2024 15:00)      HPI:  58F w/ PMHx Down Syndrome, nonverbal at baseline, Hypothyroidism, Cerebral palsy and Seizure Disorders presents to the ED [facility] presented to ED for respiratory distress and high grade fever. Patient found to be septic on admission. As per isaias Savage at bedside, patient had been coughing and congested for 3x days prior to admission. Denies fevers, chills, n/v/d or pain prior to admission. Patient is on a puree diet at baseline.    Vitals: Temp 104.5F (rectal), /74, , RR 24, SpO2 100% on BiPAP  Labs: Hgb 9.5 (previously 11.1), Platelet 422, INR 1.43, VBG Lactate 2.1  Imaging: CXR shows possible RML infiltrate    In the ED:  - s/p Cefepime 2g IV x1  - s/p Levaquin 750mg IV x1  - s/p 2L LR bolus    Admitted for management of acute respiratory distress 2/2 CAP/Aspiration PNA (20 Jan 2024 18:22)    ----------------------------------------------------  FLOOR COURSE  Initially stable on the floor pending discharge once nutritional status was improved; patient requiring NGT feeding per SLP secondary to dysphagia, and failing FEES. Plan was to repeat SLP evaluation and if failing to consider PEG placement. Patient was received persistently tachycardic EKG NSR initially afebrile no evidence of DVT being treated with metoprolol, however on 1/31 spiked fever triggering septic work up, patient found congested, likely aspiration, required NRB/Highflow/Venti mask, started meropenem, upgraded to SDU for respiratory status monitoring.     STEPDOWN COURSE  Pt was made DNR/DNI ([facility]); oxygenation status went from HFNC--->NC, tx'ed with danna and caspofungin  plan is to complete caspo until 3/10 and if no improvement then change care to CMO as approved by consumer advisory board emilio  pt afebrile and stable for [facility]    FLOOR COURSE:   Patient completed course of danna and caspo through 3/10 as per ID recs. Remained on midodrine 20mg for hypotension. Continued on meropenem as no clinical improvement.     UPGRADE EVENT:   Received report from nurse that patient's O2 sat was 75% and not improving on 15L NRB. In addition, that patient's HR is 137 and rectal temp of 103.1. Patient was seen and examined at bedside, found to be stating on low 70s while on 15L NRB. Patient also appeared to be shivering. Mild wheezing appreciated on lung auscultation. Rectal Temp of 103.     Ddx: Mucus Plug v. Aspiration Pna v. PE (low suspicion given on therapeutic Lovenox)    Interventions:  - CXR: L side worsening  - EKG: Sinus Tach  - Tylenol  - Cooling blanket  - ABG: pO2 low (taken while on HFNC)  - HFNC + NRB    After above interventions, remained tachycardic, HR 130s; Placed on HFNC + NRB; BP soft, holding Lasix. Attempted to visualize IVC however due to positioning unable to view, trialed with 250cc bolus, patient responsive, given a total of 500cc of fluids.       STEPDOWN COURSE. Patient admitted to ceu for further monitoring . HFNC tapered down to 40/40 , patient having no respiratory deteriorations , receiving diet through NG . taking IV Abx as vancomycin and levofloxACIN. PROCESS FOR cmo CODE initiated with the state board . Patient DNI/DNR as of now         REVIEW OF SYSTEMS:  Full review of system could not be done , patient looking comfortable , not in acute distress .     MEDICATIONS:  acetaminophen     Tablet .. 650 milliGRAM(s) Oral every 8 hours PRN  acetylcysteine 20%  Inhalation 4 milliLiter(s) Inhalation every 6 hours  albuterol/ipratropium for Nebulization 3 milliLiter(s) Nebulizer every 6 hours  aluminum hydroxide/magnesium hydroxide/simethicone Suspension 30 milliLiter(s) Oral every 4 hours PRN  AQUAPHOR (petrolatum Ointment) 1 Application(s) Topical two times a day  artificial  tears Solution 1 Drop(s) Both EYES two times a day  calcium carbonate   1250 mG (OsCal) 1 Tablet(s) Oral two times a day  chlorhexidine 2% Cloths 1 Application(s) Topical daily  chlorhexidine 2% Cloths 1 Application(s) Topical daily  enoxaparin Injectable 50 milliGRAM(s) SubCutaneous every 12 hours  erythromycin   Ointment 1 Application(s) Both EYES daily  gabapentin 300 milliGRAM(s) Oral every 12 hours  levETIRAcetam  IVPB 500 milliGRAM(s) IV Intermittent two times a day  levoFLOXacin IVPB 750 milliGRAM(s) IV Intermittent every 24 hours  levoFLOXacin IVPB      melatonin 3 milliGRAM(s) Oral at bedtime  meropenem  IVPB 1000 milliGRAM(s) IV Intermittent every 8 hours  methylPREDNISolone sodium succinate Injectable 40 milliGRAM(s) IV Push daily  midodrine. 20 milliGRAM(s) Oral three times a day  ondansetron Injectable 4 milliGRAM(s) IV Push every 8 hours PRN  pantoprazole  Injectable 40 milliGRAM(s) IV Push every 24 hours  polyethylene glycol 3350 17 Gram(s) Oral at bedtime  raloxifene 60 milliGRAM(s) Oral daily  saccharomyces boulardii 250 milliGRAM(s) Oral two times a day  senna 2 Tablet(s) Oral at bedtime  sodium chloride 0.65% Nasal 2 Spray(s) Both Nostrils three times a day  sodium chloride 0.9% Bolus 500 milliLiter(s) IV Bolus once  vancomycin  IVPB 750 milliGRAM(s) IV Intermittent every 12 hours      T(C): 36.9 (03-17-24 @ 16:00), Max: 36.9 (03-17-24 @ 16:00)  HR: 92 (03-17-24 @ 16:00) (63 - 97)  BP: 108/64 (03-17-24 @ 16:00) (93/77 - 127/52)  RR: 20 (03-17-24 @ 16:00) (20 - 20)  SpO2: 100% (03-17-24 @ 16:00) (100% - 100%)  Wt(kg): --Vital Signs Last 24 Hrs  T(C): 36.9 (17 Mar 2024 16:00), Max: 36.9 (17 Mar 2024 16:00)  T(F): 98.4 (17 Mar 2024 16:00), Max: 98.4 (17 Mar 2024 16:00)  HR: 92 (17 Mar 2024 16:00) (63 - 97)  BP: 108/64 (17 Mar 2024 16:00) (93/77 - 127/52)  BP(mean): --  RR: 20 (17 Mar 2024 16:00) (20 - 20)  SpO2: 100% (17 Mar 2024 16:00) (100% - 100%)        PHYSICAL EXAM:  GENERAL: NAD.   HEAD:  Atraumatic, Normocephalic  CHEST/LUNG: Bilaterall rhonchi and crackles   HEART: Regular rate and rhythm; No murmurs, rubs, or gallops  ABDOMEN: Soft, Nontender, Nondistended; Bowel sounds present  NEURO:alert  , non oriented non verbal   EXTREMITIES: No LE edema, no calf tenderness  LYMPH: No lymphadenopathy noted  SKIN: No rashes or lesions    Consultant(s) Notes Reviewed:  [x ] YES  [ ] NO  Care Discussed with Consultants/Other Providers [ x] YES  [ ] NO    LABS:                        9.3    6.23  )-----------( 433      ( 17 Mar 2024 05:08 )             30.8     03-17    138  |  97<L>  |  16  ----------------------------<  73  4.2   |  26  |  0.5<L>    Ca    9.6      17 Mar 2024 05:08    TPro  6.9  /  Alb  3.1<L>  /  TBili  0.2  /  DBili  x   /  AST  20  /  ALT  12  /  AlkPhos  88  03-17      Urinalysis Basic - ( 17 Mar 2024 05:08 )    Color: x / Appearance: x / SG: x / pH: x  Gluc: 73 mg/dL / Ketone: x  / Bili: x / Urobili: x   Blood: x / Protein: x / Nitrite: x   Leuk Esterase: x / RBC: x / WBC x   Sq Epi: x / Non Sq Epi: x / Bacteria: x      CAPILLARY BLOOD GLUCOSE      POCT Blood Glucose.: 133 mg/dL (17 Mar 2024 16:11)  POCT Blood Glucose.: 137 mg/dL (17 Mar 2024 11:35)  POCT Blood Glucose.: 170 mg/dL (17 Mar 2024 07:04)  POCT Blood Glucose.: 94 mg/dL (16 Mar 2024 21:12)        Urinalysis Basic - ( 17 Mar 2024 05:08 )    Color: x / Appearance: x / SG: x / pH: x  Gluc: 73 mg/dL / Ketone: x  / Bili: x / Urobili: x   Blood: x / Protein: x / Nitrite: x   Leuk Esterase: x / RBC: x / WBC x   Sq Epi: x / Non Sq Epi: x / Bacteria: x        RADIOLOGY & ADDITIONAL TESTS:    Imaging Personally Reviewed:  [x ] YES  [ ] NO

## 2024-03-17 NOTE — CHART NOTE - NSCHARTNOTEFT_GEN_A_CORE
Registered Dietitian Follow-Up     Patient Profile Reviewed                           Yes [x]   No []     Pertinent Subjective Information:  Patient noted to be NPO at time of visit     Pertinent Medical Interventions:  Sepsis POA / Acute hypoxic resp failure/RSV bronchiolitis/ H/o dysphagia / suspected aspiration  -failed FEES, NG tube for feedings and medications    -Per Palliative Care note 3/15 - currently, patient with recurrent infection on most recent bcx, and continued dependence on O2, after a period of improvement; if patient has an end-stage infectious process, may warrant consideration of CMO with [facility].    Anthropometrics:  Height: 136.4 cm   Weight: 52.3 kg  BMI: 28.1  IBW: 29.5 kg     Daily Weight in k (-), Weight in k.5 (-15)      MEDICATIONS  (STANDING):  acetylcysteine 20%  Inhalation 4 milliLiter(s) Inhalation every 6 hours  albuterol/ipratropium for Nebulization 3 milliLiter(s) Nebulizer every 6 hours  AQUAPHOR (petrolatum Ointment) 1 Application(s) Topical two times a day  artificial  tears Solution 1 Drop(s) Both EYES two times a day  calcium carbonate   1250 mG (OsCal) 1 Tablet(s) Oral two times a day  chlorhexidine 2% Cloths 1 Application(s) Topical daily  chlorhexidine 2% Cloths 1 Application(s) Topical daily  enoxaparin Injectable 50 milliGRAM(s) SubCutaneous every 12 hours  erythromycin   Ointment 1 Application(s) Both EYES daily  gabapentin 300 milliGRAM(s) Oral every 12 hours  levETIRAcetam  IVPB 500 milliGRAM(s) IV Intermittent two times a day  levoFLOXacin IVPB      levoFLOXacin IVPB 750 milliGRAM(s) IV Intermittent every 24 hours  melatonin 3 milliGRAM(s) Oral at bedtime  meropenem  IVPB 1000 milliGRAM(s) IV Intermittent every 8 hours  methylPREDNISolone sodium succinate Injectable 40 milliGRAM(s) IV Push daily  midodrine. 20 milliGRAM(s) Oral three times a day  pantoprazole  Injectable 40 milliGRAM(s) IV Push every 24 hours  polyethylene glycol 3350 17 Gram(s) Oral at bedtime  raloxifene 60 milliGRAM(s) Oral daily  saccharomyces boulardii 250 milliGRAM(s) Oral two times a day  senna 2 Tablet(s) Oral at bedtime  sodium chloride 0.65% Nasal 2 Spray(s) Both Nostrils three times a day  sodium chloride 0.9% Bolus 500 milliLiter(s) IV Bolus once  vancomycin  IVPB 750 milliGRAM(s) IV Intermittent every 12 hours    MEDICATIONS  (PRN):  acetaminophen     Tablet .. 650 milliGRAM(s) Oral every 8 hours PRN Temp greater or equal to 38.5C (101.3F)  aluminum hydroxide/magnesium hydroxide/simethicone Suspension 30 milliLiter(s) Oral every 4 hours PRN Dyspepsia  ondansetron Injectable 4 milliGRAM(s) IV Push every 8 hours PRN Nausea and/or Vomiting    Pertinent Labs:  @ 05:08: Na 138, BUN 16, Cr 0.5<L>, BG 73, K+ 4.2, Phos --, Mg --, Alk Phos 88, ALT/SGPT 12, AST/SGOT 20, HbA1c --    Finger Sticks:  POCT Blood Glucose.: 137 mg/dL ( @ 11:35)  POCT Blood Glucose.: 170 mg/dL ( @ 07:04)  POCT Blood Glucose.: 94 mg/dL ( @ 21:12)  POCT Blood Glucose.: 99 mg/dL ( @ 16:20)    Physical Findings:  - Appearance: somnolent  - GI function: last BM 3/14   - Tubes: +NGT  - Oral/Mouth cavity: NPO currently   - Skin: Pressure Injury: R buttocks - stage II, R heel unstageable, L ankle DTI   - Edema: no edema documented     Nutrition Requirements  Weight Used: 52.3kg -Derived from nutrition note ()      Estimated Energy Needs    Continue [x]  Adjust []  Adjusted Energy Recommendations: 1307-1493kcal/day (MSJ x 1.4-1.6)     Estimated Protein Needs    Continue [x]  Adjust []  Adjusted Protein Recommendations: 68-84gm/day (1.3-1.5 gm/kg)      Estimated Fluid Needs        Continue [x]  Adjust []  Adjusted Fluid Recommendations: 1308-1569mL/day (25-30mL/kg)    Nutrient Intake: NPO at this time      [x] Previous Nutrition Diagnosis:  Increased Nutrient Needs             [x] Ongoing          [] Resolved     Nutrition Intervention:  EN, coordination of care     Goal/Expected Outcome:   EN to meet >85% and <105% of estimated nutrient needs within 3-5 days      Indicator/Monitoring:   EN tolerance, BM, GI s/s, weight, labs, skin status, NFPE      Recommendation:  1) Pending GOC - (Possible CMO status)   -as medically feasible, consider re-initiation of EN as patient has been NPO x 4 days (not a candidate for aggressive measures - (PN) )   When medically feasible, If re-initiating NGT feeds - would recommend starting Jevity 1.2 at 120 mL over 1 hour and advancing 120 mL each day until goal rate of 300 mL q6hrs is reached + No carb Prosource. This would provide: 1200 mL total volume, 1500 kcal, 81.6 gm Protein, 972 mL free water from formula + recommend 50 mL pres/post feeds + 30 mL pre/post No carb prosource administration = 1432 mL total water   -Keep head of bed elevated at 45 degee angle - maintain aspiration precautions    2) Monitor BM, GI s/s  3) Monitor electrolytes, BG, renal profile  4)Monitor for possible CMO vs PEG/trach placement     Patient is at high nutrition risk, RD to f/u in 3-5 days or PRN   RD to remain available: Tania Mclean, JARVIS [phone] or via Teams.

## 2024-03-17 NOTE — PROGRESS NOTE ADULT - ASSESSMENT
IMPRESSION:    Acute hypoxemic respiratory failure improving   PNA s/p ABx  Sepsis POA  Anemia  Septic shock, off Levophed   Recurrent aspiration pneumonia / prior intubation  Elevated fungitell s/p Caspo  HO DVT on Eliquis   HO GI bleed  HO OM  HO recent duodenal perforation   HO polymicrobial bacteremia   H/o CP, DS  H/o seizures    PLAN:    CNS:  Avoid CNS Depressant, AED. MS at baseline.    HEENT: Oral care. Eye drops.    PULMONARY: HOB at 45 degrees.  Aspiration precaution. Wean FiO2 Keep spo2 92 TO 96%. CHEST PT. Nebs q6 .    CARDIOVASCULAR: Keep MAP more than 60. Avoid overload. C/w midodrine.    GI: Protonix. NG feeding. Consent for PEG    INFECTIOUS DISEASE:  ABX and Anti fungal per ID. Septic w/u. Repeat MRSA. F/u repeat fungitell. Remove midline, place new midline.    HEMATOLOGICAL:  Lovenox therapeutic.  Monitor CBC.    ENDOCRINE:  Follow up FS.  Insulin protocol if needed.    MUSCULOSKELETAL: Bedrest.  Off loading.  Wound care.    Prognosis very poor.  Palliative care following  dnr/i, possible CMO  Aggressive care is futile at this point  DGTF

## 2024-03-18 LAB
ALBUMIN SERPL ELPH-MCNC: 3.2 G/DL — LOW (ref 3.5–5.2)
ALP SERPL-CCNC: 83 U/L — SIGNIFICANT CHANGE UP (ref 30–115)
ALT FLD-CCNC: 10 U/L — SIGNIFICANT CHANGE UP (ref 0–41)
ANION GAP SERPL CALC-SCNC: 9 MMOL/L — SIGNIFICANT CHANGE UP (ref 7–14)
AST SERPL-CCNC: 11 U/L — SIGNIFICANT CHANGE UP (ref 0–41)
BASOPHILS # BLD AUTO: 0.01 K/UL — SIGNIFICANT CHANGE UP (ref 0–0.2)
BASOPHILS NFR BLD AUTO: 0.2 % — SIGNIFICANT CHANGE UP (ref 0–1)
BILIRUB SERPL-MCNC: 0.3 MG/DL — SIGNIFICANT CHANGE UP (ref 0.2–1.2)
BUN SERPL-MCNC: 12 MG/DL — SIGNIFICANT CHANGE UP (ref 10–20)
CALCIUM SERPL-MCNC: 9.5 MG/DL — SIGNIFICANT CHANGE UP (ref 8.4–10.4)
CHLORIDE SERPL-SCNC: 95 MMOL/L — LOW (ref 98–110)
CO2 SERPL-SCNC: 29 MMOL/L — SIGNIFICANT CHANGE UP (ref 17–32)
CREAT SERPL-MCNC: 0.6 MG/DL — LOW (ref 0.7–1.5)
EGFR: 105 ML/MIN/1.73M2 — SIGNIFICANT CHANGE UP
EOSINOPHIL # BLD AUTO: 0.04 K/UL — SIGNIFICANT CHANGE UP (ref 0–0.7)
EOSINOPHIL NFR BLD AUTO: 0.7 % — SIGNIFICANT CHANGE UP (ref 0–8)
FUNGITELL: 81 PG/ML — HIGH
GLUCOSE BLDC GLUCOMTR-MCNC: 159 MG/DL — HIGH (ref 70–99)
GLUCOSE SERPL-MCNC: 166 MG/DL — HIGH (ref 70–99)
HCT VFR BLD CALC: 30.5 % — LOW (ref 37–47)
HGB BLD-MCNC: 9.1 G/DL — LOW (ref 12–16)
IMM GRANULOCYTES NFR BLD AUTO: 0.3 % — SIGNIFICANT CHANGE UP (ref 0.1–0.3)
LYMPHOCYTES # BLD AUTO: 1.64 K/UL — SIGNIFICANT CHANGE UP (ref 1.2–3.4)
LYMPHOCYTES # BLD AUTO: 28 % — SIGNIFICANT CHANGE UP (ref 20.5–51.1)
MCHC RBC-ENTMCNC: 23.1 PG — LOW (ref 27–31)
MCHC RBC-ENTMCNC: 29.8 G/DL — LOW (ref 32–37)
MCV RBC AUTO: 77.4 FL — LOW (ref 81–99)
MONOCYTES # BLD AUTO: 0.4 K/UL — SIGNIFICANT CHANGE UP (ref 0.1–0.6)
MONOCYTES NFR BLD AUTO: 6.8 % — SIGNIFICANT CHANGE UP (ref 1.7–9.3)
NEUTROPHILS # BLD AUTO: 3.75 K/UL — SIGNIFICANT CHANGE UP (ref 1.4–6.5)
NEUTROPHILS NFR BLD AUTO: 64 % — SIGNIFICANT CHANGE UP (ref 42.2–75.2)
NRBC # BLD: 0 /100 WBCS — SIGNIFICANT CHANGE UP (ref 0–0)
PLATELET # BLD AUTO: 498 K/UL — HIGH (ref 130–400)
PMV BLD: 10.1 FL — SIGNIFICANT CHANGE UP (ref 7.4–10.4)
POTASSIUM SERPL-MCNC: 3.5 MMOL/L — SIGNIFICANT CHANGE UP (ref 3.5–5)
POTASSIUM SERPL-SCNC: 3.5 MMOL/L — SIGNIFICANT CHANGE UP (ref 3.5–5)
PROT SERPL-MCNC: 6.9 G/DL — SIGNIFICANT CHANGE UP (ref 6–8)
RBC # BLD: 3.94 M/UL — LOW (ref 4.2–5.4)
RBC # FLD: 19.3 % — HIGH (ref 11.5–14.5)
SODIUM SERPL-SCNC: 133 MMOL/L — LOW (ref 135–146)
WBC # BLD: 5.86 K/UL — SIGNIFICANT CHANGE UP (ref 4.8–10.8)
WBC # FLD AUTO: 5.86 K/UL — SIGNIFICANT CHANGE UP (ref 4.8–10.8)

## 2024-03-18 PROCEDURE — 71045 X-RAY EXAM CHEST 1 VIEW: CPT | Mod: 26

## 2024-03-18 PROCEDURE — 99233 SBSQ HOSP IP/OBS HIGH 50: CPT

## 2024-03-18 RX ORDER — SODIUM CHLORIDE 9 MG/ML
1000 INJECTION, SOLUTION INTRAVENOUS
Refills: 0 | Status: DISCONTINUED | OUTPATIENT
Start: 2024-03-18 | End: 2024-03-20

## 2024-03-18 RX ADMIN — LEVETIRACETAM 400 MILLIGRAM(S): 250 TABLET, FILM COATED ORAL at 17:47

## 2024-03-18 RX ADMIN — Medication 2 SPRAY(S): at 22:53

## 2024-03-18 RX ADMIN — MEROPENEM 100 MILLIGRAM(S): 1 INJECTION INTRAVENOUS at 04:32

## 2024-03-18 RX ADMIN — Medication 4 MILLILITER(S): at 20:59

## 2024-03-18 RX ADMIN — PANTOPRAZOLE SODIUM 40 MILLIGRAM(S): 20 TABLET, DELAYED RELEASE ORAL at 05:00

## 2024-03-18 RX ADMIN — Medication 3 MILLILITER(S): at 08:18

## 2024-03-18 RX ADMIN — MEROPENEM 100 MILLIGRAM(S): 1 INJECTION INTRAVENOUS at 11:14

## 2024-03-18 RX ADMIN — SODIUM CHLORIDE 60 MILLILITER(S): 9 INJECTION, SOLUTION INTRAVENOUS at 22:59

## 2024-03-18 RX ADMIN — Medication 1 DROP(S): at 05:15

## 2024-03-18 RX ADMIN — Medication 1 DROP(S): at 17:48

## 2024-03-18 RX ADMIN — Medication 1 APPLICATION(S): at 17:58

## 2024-03-18 RX ADMIN — ENOXAPARIN SODIUM 50 MILLIGRAM(S): 100 INJECTION SUBCUTANEOUS at 17:47

## 2024-03-18 RX ADMIN — SODIUM CHLORIDE 60 MILLILITER(S): 9 INJECTION, SOLUTION INTRAVENOUS at 11:14

## 2024-03-18 RX ADMIN — Medication 3 MILLILITER(S): at 20:59

## 2024-03-18 RX ADMIN — Medication 40 MILLIGRAM(S): at 05:00

## 2024-03-18 RX ADMIN — Medication 1 APPLICATION(S): at 11:16

## 2024-03-18 RX ADMIN — LEVETIRACETAM 400 MILLIGRAM(S): 250 TABLET, FILM COATED ORAL at 05:01

## 2024-03-18 RX ADMIN — Medication 2 SPRAY(S): at 13:39

## 2024-03-18 RX ADMIN — Medication 1 APPLICATION(S): at 05:13

## 2024-03-18 RX ADMIN — ENOXAPARIN SODIUM 50 MILLIGRAM(S): 100 INJECTION SUBCUTANEOUS at 05:01

## 2024-03-18 RX ADMIN — CHLORHEXIDINE GLUCONATE 1 APPLICATION(S): 213 SOLUTION TOPICAL at 11:15

## 2024-03-18 RX ADMIN — Medication 4 MILLILITER(S): at 08:18

## 2024-03-18 RX ADMIN — Medication 4 MILLILITER(S): at 14:44

## 2024-03-18 RX ADMIN — Medication 2 SPRAY(S): at 05:12

## 2024-03-18 RX ADMIN — Medication 250 MILLIGRAM(S): at 05:01

## 2024-03-18 RX ADMIN — MEROPENEM 100 MILLIGRAM(S): 1 INJECTION INTRAVENOUS at 22:53

## 2024-03-18 RX ADMIN — Medication 3 MILLILITER(S): at 14:44

## 2024-03-18 RX ADMIN — Medication 250 MILLIGRAM(S): at 17:46

## 2024-03-18 NOTE — PROGRESS NOTE ADULT - SUBJECTIVE AND OBJECTIVE BOX
58F w/ PMHx Down Syndrome, nonverbal at baseline, Hypothyroidism, Cerebral palsy and Seizure Disorders presents to the ED from nursing facility/group home ([facility]) presented to ED for respiratory distress and high grade fever. Patient found to be septic on admission.Admitted to SDU for management of acute respiratory distress 2/2 CAP/Aspiration PNA (20 Jan 2024 18:22)  While pt was on the floor she developed dyspnea after swallow evaluation, was spiking high grade fever, consulted by pulmonary/critical care and was upgraded back to SDU.  Pt developed left lung white out, improved after aggressive chest PT, treated with Meropenem and caspofungin, ID is following, her overall prognosis is very poor, she might need trach and PEG in the nearest future , a meeting with facility staff and state took place on 2/14.   Pt completed course o Meropenem, Levofloxacin added on 2/15 ( pt spiked fever), Meropenem resumed on 2/18,  she was tapered off pressure support, requires  high flow oxygen for  a while in SDU  Hb dropped without acute blood loss, will  monitor closely ( pt was on full AC).   After prolonged and complicated hospital course pt was made DNR/DNI, consulted by palliative care, CMO was discussed with consumer advisory board [facility] in case if pt'll fail recommended treatement.   Eventually pt was  off pressure support, transitioned to NC, downgraded to the floor. She completed the course of Caspofungin and Abx clinically improved and was relatively stable for a while on medical floor  On  3/14 developed respiratory distress with high oxygen requirements, worsening leukocytosis and was upgraded back to step down unit.   Pt clinically improved, pulled NGT, after multiple attempts unable to put NGT again.     PAST MEDICAL & SURGICAL HISTORY:  Down syndrome  Osteoporosis  Mild anemia  Neuropathy  S/P debridement  of R hip on 3/2/21      Vital Signs Last 24 Hrs  T(C): 36.7 (18 Mar 2024 11:33), Max: 37.1 (17 Mar 2024 21:01)  T(F): 98.1 (18 Mar 2024 11:33), Max: 98.8 (17 Mar 2024 21:01)  HR: 113 (18 Mar 2024 11:33) (92 - 123)  BP: 110/80 (18 Mar 2024 11:33) (108/64 - 151/87)  BP(mean): 91 (18 Mar 2024 11:33) (85 - 91)  RR: 20 (18 Mar 2024 11:33) (20 - 20)  SpO2: 100% (18 Mar 2024 11:33) (97% - 100%)    Parameters below as of 18 Mar 2024 11:33  Patient On (Oxygen Delivery Method): nasal cannula, high flow  O2 Flow (L/min): 4      PHYSICAL EXAM:  GENERAL:  NAD chronically ill appearing, mentally challenged   SKIN: No rashes or lesions  HEENT: Atraumatic. Normocephalic   NECK: Supple, No JVD.    PULMONARY: decreased breath sounds B/L, poor air entry   CVS: Normal S1, S2. Rate and Rhythm are regular.    ABDOMEN/GI: Soft, Nontender, Nondistended.  MSK:  contracted LE   NEUROLOGIC: does not follow commands, opens eyes spontaneously   PSYCH: Alert & oriented x 0    Consultant(s) Notes Reviewed:  [x ] YES  [ ] NO  Care Discussed with Consultants/Other Providers [ x] YES  [ ] NO    LABS:                          9.1    5.86  )-----------( 498      ( 18 Mar 2024 06:14 )             30.5   03-18    133<L>  |  95<L>  |  12  ----------------------------<  166<H>  3.5   |  29  |  0.6<L>    Ca    9.5      18 Mar 2024 06:14    TPro  6.9  /  Alb  3.2<L>  /  TBili  0.3  /  DBili  x   /  AST  11  /  ALT  10  /  AlkPhos  83  03-18      Culture - Blood (collected 25 Feb 2024 11:47)  Source: .Blood None  Preliminary Report (26 Feb 2024 20:02):    No growth at 24 hours    Culture - Blood (03.13.24 @ 10:00)   - Staphylococcus epidermidis, Methicillin resistant: Detec  Gram Stain:   Growth in anaerobic bottle: Gram Positive Cocci in Clusters  Specimen Source: .Blood Blood  Organism: Blood Culture PCR  Culture Results:   Growth in anaerobic bottle: Gram Positive Cocci in Clusters   Direct identification is available within approximately 3-5   hours either by Blood Panel Multiplexed PCR or Direct   MALDI-TOF. Details: [email or web address]  Organism Identification: Blood Culture PCR  Method Type: PCR    RADIOLOGY & ADDITIONAL TESTS:  Imaging or report Personally Reviewed:  [x] YES  [ ] NO  EKG reviewed: [x] YES  [ ] NO    < from: Xray Chest 1 View- PORTABLE-Routine (02.28.24 @ 05:32) >    Impression:    Bilateral opacifications, left greater than right, stable. Support   devices as described.    < end of copied text >  < from: CT Chest w/ IV Cont (02.21.24 @ 00:09) >  Impression:    Bilateral pneumonia, left worse than right, with a left upper lobe   thick-walled cavity.    Enteric catheter as described. Note that by the time that this report was   dictated, this issue had been tended to..    < end of copied text >  < from: CT Abdomen and Pelvis w/ IV Cont (02.21.24 @ 00:10) >    IMPRESSION:  1.  No CT evidence of an acuteabdominopelvic pathology.    See separately dictated CT chest report for intrathoracic findings.    < end of copied text >  < from: Xray Chest 1 View- PORTABLE-Routine (03.01.24 @ 06:58) >  impression:    EKG leads overlie thorax, obscuring anatomy.    Support devices: NG tube coursing below the left hemidiaphragm. The tip   is not included..    Cardiac/ Mediastinum: Stable    Lungs/ Pleura: Unchanged left greater than right opacities. No   pneumothorax    Skeletal/ soft tissues: Stable          < from: Xray Chest 1 View- PORTABLE-Routine (03.03.24 @ 06:11) >    IMPRESSION:    Unchanged left-sided opacities.      < from: Xray Chest 1 View- PORTABLE-Routine (03.04.24 @ 06:01) >  Impression:    Bilateral opacifications. Support devices as described.    < from: CT Chest w/ IV Cont (03.13.24 @ 16:40) >  Impression:    Extensive patchy  left-sided pulmonary opacities, slightly decreased from   prior CT. Patchy right-sided opacities also demonstrated. Findings are   likely infectious in etiology. Recommend follow-up.    < from: Xray Chest 1 View- PORTABLE-Urgent (Xray Chest 1 View- PORTABLE-Urgent .) (03.13.24 @ 23:21) >  IMPRESSION:    Diffuse bilateral patchy opacities, left greater than right.    Feeding tube coiled in nasopharynx and terminates in stomach.    < end of copied text >        MEDICATIONS  (STANDING):  acetylcysteine 20%  Inhalation 4 milliLiter(s) Inhalation every 6 hours  albuterol/ipratropium for Nebulization 3 milliLiter(s) Nebulizer every 6 hours  AQUAPHOR (petrolatum Ointment) 1 Application(s) Topical two times a day  artificial  tears Solution 1 Drop(s) Both EYES two times a day  calcium carbonate   1250 mG (OsCal) 1 Tablet(s) Oral two times a day  chlorhexidine 2% Cloths 1 Application(s) Topical daily  chlorhexidine 2% Cloths 1 Application(s) Topical daily  enoxaparin Injectable 50 milliGRAM(s) SubCutaneous every 12 hours  erythromycin   Ointment 1 Application(s) Both EYES daily  gabapentin 300 milliGRAM(s) Oral every 12 hours  lactated ringers. 1000 milliLiter(s) (60 mL/Hr) IV Continuous <Continuous>  levETIRAcetam  IVPB 500 milliGRAM(s) IV Intermittent two times a day  levoFLOXacin IVPB 750 milliGRAM(s) IV Intermittent every 24 hours  levoFLOXacin IVPB      melatonin 3 milliGRAM(s) Oral at bedtime  meropenem  IVPB 1000 milliGRAM(s) IV Intermittent every 8 hours  methylPREDNISolone sodium succinate Injectable 40 milliGRAM(s) IV Push daily  midodrine. 20 milliGRAM(s) Oral three times a day  pantoprazole  Injectable 40 milliGRAM(s) IV Push every 24 hours  polyethylene glycol 3350 17 Gram(s) Oral at bedtime  raloxifene 60 milliGRAM(s) Oral daily  saccharomyces boulardii 250 milliGRAM(s) Oral two times a day  senna 2 Tablet(s) Oral at bedtime  sodium chloride 0.65% Nasal 2 Spray(s) Both Nostrils three times a day  sodium chloride 0.9% Bolus 500 milliLiter(s) IV Bolus once  vancomycin  IVPB 750 milliGRAM(s) IV Intermittent every 12 hours    MEDICATIONS  (PRN):  acetaminophen     Tablet .. 650 milliGRAM(s) Oral every 8 hours PRN Temp greater or equal to 38.5C (101.3F)  aluminum hydroxide/magnesium hydroxide/simethicone Suspension 30 milliLiter(s) Oral every 4 hours PRN Dyspepsia  ondansetron Injectable 4 milliGRAM(s) IV Push every 8 hours PRN Nausea and/or Vomiting

## 2024-03-18 NOTE — PROGRESS NOTE ADULT - SUBJECTIVE AND OBJECTIVE BOX
TEA ECHAVARRIA  58y, Female  Allergy: No Known Allergies      LOS  58d    CHIEF COMPLAINT: Respiratory Distress (18 Mar 2024 08:22)      INTERVAL EVENTS/HPI  - T(F): , Max: 98.8 (03-17-24 @ 21:01)  - WBC Count: 5.86 (03-18-24 @ 06:14)  WBC Count: 6.23 (03-17-24 @ 05:08)     - Creatinine: 0.6 (03-18-24 @ 06:14)  Creatinine: 0.5 (03-17-24 @ 05:08)     -   -   -     ROS  cannot obtain secondary to patient's sedation and/or mental status    VITALS:  T(F): 98.4, Max: 98.8 (03-17-24 @ 21:01)  HR: 107  BP: 110/72  RR: 20Vital Signs Last 24 Hrs  T(C): 36.9 (18 Mar 2024 07:57), Max: 37.1 (17 Mar 2024 21:01)  T(F): 98.4 (18 Mar 2024 07:57), Max: 98.8 (17 Mar 2024 21:01)  HR: 107 (18 Mar 2024 07:57) (92 - 123)  BP: 110/72 (18 Mar 2024 07:57) (93/77 - 151/87)  BP(mean): 85 (18 Mar 2024 07:57) (85 - 85)  RR: 20 (18 Mar 2024 07:57) (20 - 20)  SpO2: 100% (18 Mar 2024 08:26) (97% - 100%)    Parameters below as of 18 Mar 2024 08:26  Patient On (Oxygen Delivery Method): nasal cannula, high flow  O2 Flow (L/min): 40  O2 Concentration (%): 40    PHYSICAL EXAM:  ***    FH: Non-contributory  Social Hx: Non-contributory    TESTS & MEASUREMENTS:                        9.1    5.86  )-----------( 498      ( 18 Mar 2024 06:14 )             30.5     03-18    133<L>  |  95<L>  |  12  ----------------------------<  166<H>  3.5   |  29  |  0.6<L>    Ca    9.5      18 Mar 2024 06:14    TPro  6.9  /  Alb  3.2<L>  /  TBili  0.3  /  DBili  x   /  AST  11  /  ALT  10  /  AlkPhos  83  03-18      LIVER FUNCTIONS - ( 18 Mar 2024 06:14 )  Alb: 3.2 g/dL / Pro: 6.9 g/dL / ALK PHOS: 83 U/L / ALT: 10 U/L / AST: 11 U/L / GGT: x           Urinalysis Basic - ( 18 Mar 2024 06:14 )    Color: x / Appearance: x / SG: x / pH: x  Gluc: 166 mg/dL / Ketone: x  / Bili: x / Urobili: x   Blood: x / Protein: x / Nitrite: x   Leuk Esterase: x / RBC: x / WBC x   Sq Epi: x / Non Sq Epi: x / Bacteria: x        Culture - Blood (collected 03-15-24 @ 11:43)  Source: .Blood None  Preliminary Report (03-17-24 @ 23:01):    No growth at 48 Hours    Culture - Blood (collected 03-13-24 @ 10:00)  Source: .Blood Blood  Gram Stain (03-15-24 @ 02:17):    Growth in anaerobic bottle: Gram Positive Cocci in Clusters  Final Report (03-15-24 @ 19:16):    Growth in anaerobic bottle: Staphylococcus epidermidis    Isolation of Coagulase negative Staphylococcus from single blood culture    sets may represent    contamination. Contact the Microbiology Department at [phone] if    susceptibility testing is    clinically indicated.    Direct identification is available within approximately 3-5    hours either by Blood Panel Multiplexed PCR or Direct    MALDI-TOF. Details: [email or web address]  Organism: Blood Culture PCR (03-15-24 @ 19:16)  Organism: Blood Culture PCR (03-15-24 @ 19:16)      Method Type: PCR      -  Staphylococcus epidermidis, Methicillin resistant: Detec    Culture - Blood (collected 03-08-24 @ 08:30)  Source: .Blood None  Final Report (03-13-24 @ 14:01):    No growth at 5 days    Culture - Blood (collected 03-07-24 @ 22:12)  Source: .Blood Blood-Peripheral  Final Report (03-13-24 @ 07:00):    No growth at 5 days    Culture - Blood (collected 02-25-24 @ 11:47)  Source: .Blood None  Final Report (03-01-24 @ 20:00):    No growth at 5 days    Culture - Blood (collected 02-19-24 @ 11:23)  Source: .Blood None  Final Report (02-24-24 @ 20:01):    No growth at 5 days    Culture - Blood (collected 02-17-24 @ 20:41)  Source: .Blood None  Final Report (02-23-24 @ 06:00):    No growth at 5 days            INFECTIOUS DISEASES TESTING  MRSA PCR Result.: Negative (03-16-24 @ 23:45)  Procalcitonin, Serum: 0.19 (03-13-24 @ 07:08)  Fungitell: 73 (02-23-24 @ 18:19)  MRSA PCR Result.: Negative (02-20-24 @ 13:42)  Fungitell: 76 (02-19-24 @ 16:00)  Procalcitonin, Serum: 0.16 (02-19-24 @ 16:00)  Procalcitonin, Serum: 0.20 (02-19-24 @ 11:23)  Rapid RVP Result: NotDetec (02-17-24 @ 19:06)  Procalcitonin, Serum: 0.11 (02-17-24 @ 10:41)  MRSA PCR Result.: Negative (02-15-24 @ 06:20)  Procalcitonin, Serum: 0.10 (02-12-24 @ 11:17)  Rapid RVP Result: NotDetec (02-12-24 @ 10:28)  MRSA PCR Result.: Negative (02-12-24 @ 10:28)  Fungitell: 63 (02-06-24 @ 11:27)  Fungitell: 65 (02-06-24 @ 04:43)  Rapid RVP Result: NotDetec (02-04-24 @ 10:28)  MRSA PCR Result.: Negative (02-03-24 @ 21:32)  Procalcitonin, Serum: 0.15 (02-03-24 @ 11:13)  Procalcitonin, Serum: 0.22 (02-01-24 @ 16:00)  MRSA PCR Result.: Negative (01-22-24 @ 05:30)  Legionella Antigen, Urine: Negative (01-21-24 @ 05:00)  Rapid RVP Result: Detected (01-21-24 @ 00:58)  Procalcitonin, Serum: 0.51 (01-20-24 @ 21:23)  Fungitell: 325 (01-20-24 @ 21:23)  Fungitell: 138 (11-07-23 @ 08:08)  Fungitell: 115 (11-02-23 @ 11:40)  Procalcitonin, Serum: 0.04 (11-02-23 @ 11:40)  Procalcitonin, Serum: 0.26 (10-24-23 @ 11:00)  MRSA PCR Result.: Negative (10-17-23 @ 17:20)  Fungitell: >500 (10-17-23 @ 17:20)  Procalcitonin, Serum: 4.36 (10-17-23 @ 17:20)  Procalcitonin, Serum: 4.18 (10-17-23 @ 12:35)  Procalcitonin, Serum: 0.07 (10-15-23 @ 07:28)  COVID-19 PCR: NotDetec (10-12-23 @ 09:14)  Procalcitonin, Serum: 0.40 (10-07-23 @ 10:54)  Legionella Antigen, Urine: Negative (09-30-23 @ 14:36)  MRSA PCR Result.: Negative (09-29-23 @ 16:50)  Procalcitonin, Serum: 9.78 (08-12-23 @ 11:25)  MRSA PCR Result.: Negative (08-12-23 @ 06:10)  Rapid RVP Result: NotDetec (08-12-23 @ 01:33)  Procalcitonin, Serum: 0.63 (08-10-23 @ 08:46)  Procalcitonin, Serum: 7.82 (08-04-23 @ 16:13)  MRSA PCR Result.: Negative (08-04-23 @ 14:52)  Rapid RVP Result: NotDetec (08-04-23 @ 03:00)      INFLAMMATORY MARKERS  Sedimentation Rate, Erythrocyte: 100 mm/Hr (02-03-24 @ 11:13)  C-Reactive Protein, Serum: 214.2 mg/L (02-03-24 @ 11:13)  C-Reactive Protein, Serum: 132.3 mg/L (02-01-24 @ 16:00)  Sedimentation Rate, Erythrocyte: 67 mm/Hr (10-15-23 @ 07:28)      RADIOLOGY & ADDITIONAL TESTS:  I have personally reviewed the last available Chest xray  CXR      CT      CARDIOLOGY TESTING  12 Lead ECG:   Ventricular Rate 135 BPM    Atrial Rate 135 BPM    P-R Interval 116 ms    QRS Duration 66 ms    Q-T Interval 294 ms    QTC Calculation(Bazett) 441 ms    P Axis 34 degrees    R Axis 49 degrees    T Axis 30 degrees    Diagnosis Line Sinus tachycardia  Possible Left atrial enlargement  Borderline ECG    Confirmed by MARJAN MENDES MD ([id number]) on 3/14/2024 6:37:25 AM (03-13-24 @ 23:14)  12 Lead ECG:   Ventricular Rate 107 BPM    Atrial Rate 107 BPM    P-R Interval 120 ms    QRS Duration 66 ms    Q-T Interval 322 ms    QTC Calculation(Bazett) 429 ms    P Axis 50 degrees    R Axis 90 degrees    T Axis 48 degrees    Diagnosis Line Sinus tachycardia  Rightward axis  Borderline ECG    Confirmed by caleb roberts ([id number]) on 3/13/2024 12:38:34 PM (03-13-24 @ 07:52)      MEDICATIONS  acetylcysteine 20%  Inhalation 4  albuterol/ipratropium for Nebulization 3  AQUAPHOR (petrolatum Ointment) 1  artificial  tears Solution 1  calcium carbonate   1250 mG (OsCal) 1  chlorhexidine 2% Cloths 1  chlorhexidine 2% Cloths 1  enoxaparin Injectable 50  erythromycin   Ointment 1  gabapentin 300  lactated ringers. 1000  levETIRAcetam  IVPB 500  levoFLOXacin IVPB   levoFLOXacin IVPB 750  melatonin 3  meropenem  IVPB 1000  methylPREDNISolone sodium succinate Injectable 40  midodrine. 20  pantoprazole  Injectable 40  polyethylene glycol 3350 17  raloxifene 60  saccharomyces boulardii 250  senna 2  sodium chloride 0.65% Nasal 2  sodium chloride 0.9% Bolus 500  vancomycin  IVPB 750      WEIGHT  Weight (kg): 52.3 (01-21-24 @ 08:00)  Creatinine: 0.6 mg/dL (03-18-24 @ 06:14)      ANTIBIOTICS:  levoFLOXacin IVPB      levoFLOXacin IVPB 750 milliGRAM(s) IV Intermittent every 24 hours  meropenem  IVPB 1000 milliGRAM(s) IV Intermittent every 8 hours  vancomycin  IVPB 750 milliGRAM(s) IV Intermittent every 12 hours      All available historical records have been reviewed   TEA ECHAVARRIA  58y, Female  Allergy: No Known Allergies      LOS  58d    CHIEF COMPLAINT: Respiratory Distress (18 Mar 2024 08:22)      INTERVAL EVENTS/HPI  - T(F): , Max: 98.8 (03-17-24 @ 21:01)  - WBC Count: 5.86 (03-18-24 @ 06:14)  WBC Count: 6.23 (03-17-24 @ 05:08)     - Creatinine: 0.6 (03-18-24 @ 06:14)  Creatinine: 0.5 (03-17-24 @ 05:08)     -   -   -     ROS  cannot obtain secondary to patient's sedation and/or mental status    VITALS:  T(F): 98.4, Max: 98.8 (03-17-24 @ 21:01)  HR: 107  BP: 110/72  RR: 20Vital Signs Last 24 Hrs  T(C): 36.9 (18 Mar 2024 07:57), Max: 37.1 (17 Mar 2024 21:01)  T(F): 98.4 (18 Mar 2024 07:57), Max: 98.8 (17 Mar 2024 21:01)  HR: 107 (18 Mar 2024 07:57) (92 - 123)  BP: 110/72 (18 Mar 2024 07:57) (93/77 - 151/87)  BP(mean): 85 (18 Mar 2024 07:57) (85 - 85)  RR: 20 (18 Mar 2024 07:57) (20 - 20)  SpO2: 100% (18 Mar 2024 08:26) (97% - 100%)    Parameters below as of 18 Mar 2024 08:26  Patient On (Oxygen Delivery Method): nasal cannula, high flow  O2 Flow (L/min): 40  O2 Concentration (%): 40    PHYSICAL EXAM:  Gen: chronically ill appearing  HFNC contracted  HEENT: Normocephalic, atraumatic  Neck: supple, no lymphadenopathy  CV: Regular rate & regular rhythm  Lungs: decreased BS at bases, no fremitus  Abdomen: Soft, BS present  Ext: Warm, well perfused  Neuro: non focal, not following commands  Skin: no rash, no erythema  Lines: no phlebitis     FH: Non-contributory  Social Hx: Non-contributory    TESTS & MEASUREMENTS:                        9.1    5.86  )-----------( 498      ( 18 Mar 2024 06:14 )             30.5     03-18    133<L>  |  95<L>  |  12  ----------------------------<  166<H>  3.5   |  29  |  0.6<L>    Ca    9.5      18 Mar 2024 06:14    TPro  6.9  /  Alb  3.2<L>  /  TBili  0.3  /  DBili  x   /  AST  11  /  ALT  10  /  AlkPhos  83  03-18      LIVER FUNCTIONS - ( 18 Mar 2024 06:14 )  Alb: 3.2 g/dL / Pro: 6.9 g/dL / ALK PHOS: 83 U/L / ALT: 10 U/L / AST: 11 U/L / GGT: x           Urinalysis Basic - ( 18 Mar 2024 06:14 )    Color: x / Appearance: x / SG: x / pH: x  Gluc: 166 mg/dL / Ketone: x  / Bili: x / Urobili: x   Blood: x / Protein: x / Nitrite: x   Leuk Esterase: x / RBC: x / WBC x   Sq Epi: x / Non Sq Epi: x / Bacteria: x        Culture - Blood (collected 03-15-24 @ 11:43)  Source: .Blood None  Preliminary Report (03-17-24 @ 23:01):    No growth at 48 Hours    Culture - Blood (collected 03-13-24 @ 10:00)  Source: .Blood Blood  Gram Stain (03-15-24 @ 02:17):    Growth in anaerobic bottle: Gram Positive Cocci in Clusters  Final Report (03-15-24 @ 19:16):    Growth in anaerobic bottle: Staphylococcus epidermidis    Isolation of Coagulase negative Staphylococcus from single blood culture    sets may represent    contamination. Contact the Microbiology Department at [phone] if    susceptibility testing is    clinically indicated.    Direct identification is available within approximately 3-5    hours either by Blood Panel Multiplexed PCR or Direct    MALDI-TOF. Details: [email or web address]  Organism: Blood Culture PCR (03-15-24 @ 19:16)  Organism: Blood Culture PCR (03-15-24 @ 19:16)      Method Type: PCR      -  Staphylococcus epidermidis, Methicillin resistant: Detec    Culture - Blood (collected 03-08-24 @ 08:30)  Source: .Blood None  Final Report (03-13-24 @ 14:01):    No growth at 5 days    Culture - Blood (collected 03-07-24 @ 22:12)  Source: .Blood Blood-Peripheral  Final Report (03-13-24 @ 07:00):    No growth at 5 days    Culture - Blood (collected 02-25-24 @ 11:47)  Source: .Blood None  Final Report (03-01-24 @ 20:00):    No growth at 5 days    Culture - Blood (collected 02-19-24 @ 11:23)  Source: .Blood None  Final Report (02-24-24 @ 20:01):    No growth at 5 days    Culture - Blood (collected 02-17-24 @ 20:41)  Source: .Blood None  Final Report (02-23-24 @ 06:00):    No growth at 5 days            INFECTIOUS DISEASES TESTING  MRSA PCR Result.: Negative (03-16-24 @ 23:45)  Procalcitonin, Serum: 0.19 (03-13-24 @ 07:08)  Fungitell: 73 (02-23-24 @ 18:19)  MRSA PCR Result.: Negative (02-20-24 @ 13:42)  Fungitell: 76 (02-19-24 @ 16:00)  Procalcitonin, Serum: 0.16 (02-19-24 @ 16:00)  Procalcitonin, Serum: 0.20 (02-19-24 @ 11:23)  Rapid RVP Result: NotDetec (02-17-24 @ 19:06)  Procalcitonin, Serum: 0.11 (02-17-24 @ 10:41)  MRSA PCR Result.: Negative (02-15-24 @ 06:20)  Procalcitonin, Serum: 0.10 (02-12-24 @ 11:17)  Rapid RVP Result: NotDetec (02-12-24 @ 10:28)  MRSA PCR Result.: Negative (02-12-24 @ 10:28)  Fungitell: 63 (02-06-24 @ 11:27)  Fungitell: 65 (02-06-24 @ 04:43)  Rapid RVP Result: NotDetec (02-04-24 @ 10:28)  MRSA PCR Result.: Negative (02-03-24 @ 21:32)  Procalcitonin, Serum: 0.15 (02-03-24 @ 11:13)  Procalcitonin, Serum: 0.22 (02-01-24 @ 16:00)  MRSA PCR Result.: Negative (01-22-24 @ 05:30)  Legionella Antigen, Urine: Negative (01-21-24 @ 05:00)  Rapid RVP Result: Detected (01-21-24 @ 00:58)  Procalcitonin, Serum: 0.51 (01-20-24 @ 21:23)  Fungitell: 325 (01-20-24 @ 21:23)  Fungitell: 138 (11-07-23 @ 08:08)  Fungitell: 115 (11-02-23 @ 11:40)  Procalcitonin, Serum: 0.04 (11-02-23 @ 11:40)  Procalcitonin, Serum: 0.26 (10-24-23 @ 11:00)  MRSA PCR Result.: Negative (10-17-23 @ 17:20)  Fungitell: >500 (10-17-23 @ 17:20)  Procalcitonin, Serum: 4.36 (10-17-23 @ 17:20)  Procalcitonin, Serum: 4.18 (10-17-23 @ 12:35)  Procalcitonin, Serum: 0.07 (10-15-23 @ 07:28)  COVID-19 PCR: NotDetec (10-12-23 @ 09:14)  Procalcitonin, Serum: 0.40 (10-07-23 @ 10:54)  Legionella Antigen, Urine: Negative (09-30-23 @ 14:36)  MRSA PCR Result.: Negative (09-29-23 @ 16:50)  Procalcitonin, Serum: 9.78 (08-12-23 @ 11:25)  MRSA PCR Result.: Negative (08-12-23 @ 06:10)  Rapid RVP Result: NotDetec (08-12-23 @ 01:33)  Procalcitonin, Serum: 0.63 (08-10-23 @ 08:46)  Procalcitonin, Serum: 7.82 (08-04-23 @ 16:13)  MRSA PCR Result.: Negative (08-04-23 @ 14:52)  Rapid RVP Result: NotDetec (08-04-23 @ 03:00)      INFLAMMATORY MARKERS  Sedimentation Rate, Erythrocyte: 100 mm/Hr (02-03-24 @ 11:13)  C-Reactive Protein, Serum: 214.2 mg/L (02-03-24 @ 11:13)  C-Reactive Protein, Serum: 132.3 mg/L (02-01-24 @ 16:00)  Sedimentation Rate, Erythrocyte: 67 mm/Hr (10-15-23 @ 07:28)      RADIOLOGY & ADDITIONAL TESTS:  I have personally reviewed the last available Chest xray  CXR      CT      CARDIOLOGY TESTING  12 Lead ECG:   Ventricular Rate 135 BPM    Atrial Rate 135 BPM    P-R Interval 116 ms    QRS Duration 66 ms    Q-T Interval 294 ms    QTC Calculation(Bazett) 441 ms    P Axis 34 degrees    R Axis 49 degrees    T Axis 30 degrees    Diagnosis Line Sinus tachycardia  Possible Left atrial enlargement  Borderline ECG    Confirmed by MARJAN MENDES MD ([id number]) on 3/14/2024 6:37:25 AM (03-13-24 @ 23:14)  12 Lead ECG:   Ventricular Rate 107 BPM    Atrial Rate 107 BPM    P-R Interval 120 ms    QRS Duration 66 ms    Q-T Interval 322 ms    QTC Calculation(Bazett) 429 ms    P Axis 50 degrees    R Axis 90 degrees    T Axis 48 degrees    Diagnosis Line Sinus tachycardia  Rightward axis  Borderline ECG    Confirmed by caleb roberts ([id number]) on 3/13/2024 12:38:34 PM (03-13-24 @ 07:52)      MEDICATIONS  acetylcysteine 20%  Inhalation 4  albuterol/ipratropium for Nebulization 3  AQUAPHOR (petrolatum Ointment) 1  artificial  tears Solution 1  calcium carbonate   1250 mG (OsCal) 1  chlorhexidine 2% Cloths 1  chlorhexidine 2% Cloths 1  enoxaparin Injectable 50  erythromycin   Ointment 1  gabapentin 300  lactated ringers. 1000  levETIRAcetam  IVPB 500  levoFLOXacin IVPB   levoFLOXacin IVPB 750  melatonin 3  meropenem  IVPB 1000  methylPREDNISolone sodium succinate Injectable 40  midodrine. 20  pantoprazole  Injectable 40  polyethylene glycol 3350 17  raloxifene 60  saccharomyces boulardii 250  senna 2  sodium chloride 0.65% Nasal 2  sodium chloride 0.9% Bolus 500  vancomycin  IVPB 750      WEIGHT  Weight (kg): 52.3 (01-21-24 @ 08:00)  Creatinine: 0.6 mg/dL (03-18-24 @ 06:14)      ANTIBIOTICS:  levoFLOXacin IVPB      levoFLOXacin IVPB 750 milliGRAM(s) IV Intermittent every 24 hours  meropenem  IVPB 1000 milliGRAM(s) IV Intermittent every 8 hours  vancomycin  IVPB 750 milliGRAM(s) IV Intermittent every 12 hours      All available historical records have been reviewed

## 2024-03-18 NOTE — PROGRESS NOTE ADULT - SUBJECTIVE AND OBJECTIVE BOX
24H events:    Patient is a 58y old Female who presents with a chief complaint of Respiratory Distress (18 Mar 2024 07:28)    Primary diagnosis of Sepsis with acute hypoxic respiratory failure      Today is hospital day 58d. This morning patient was seen and examined at bedside, resting comfortably in bed.    No acute or major events overnight.      PAST MEDICAL & SURGICAL HISTORY  Down syndrome    Osteoporosis    Mild anemia    Neuropathy    S/P debridement  of R hip on 3/2/21      SOCIAL HISTORY:  Social History:      ALLERGIES:  No Known Allergies    MEDICATIONS:  STANDING MEDICATIONS  acetylcysteine 20%  Inhalation 4 milliLiter(s) Inhalation every 6 hours  albuterol/ipratropium for Nebulization 3 milliLiter(s) Nebulizer every 6 hours  AQUAPHOR (petrolatum Ointment) 1 Application(s) Topical two times a day  artificial  tears Solution 1 Drop(s) Both EYES two times a day  calcium carbonate   1250 mG (OsCal) 1 Tablet(s) Oral two times a day  chlorhexidine 2% Cloths 1 Application(s) Topical daily  chlorhexidine 2% Cloths 1 Application(s) Topical daily  enoxaparin Injectable 50 milliGRAM(s) SubCutaneous every 12 hours  erythromycin   Ointment 1 Application(s) Both EYES daily  gabapentin 300 milliGRAM(s) Oral every 12 hours  levETIRAcetam  IVPB 500 milliGRAM(s) IV Intermittent two times a day  levoFLOXacin IVPB      levoFLOXacin IVPB 750 milliGRAM(s) IV Intermittent every 24 hours  melatonin 3 milliGRAM(s) Oral at bedtime  meropenem  IVPB 1000 milliGRAM(s) IV Intermittent every 8 hours  methylPREDNISolone sodium succinate Injectable 40 milliGRAM(s) IV Push daily  midodrine. 20 milliGRAM(s) Oral three times a day  pantoprazole  Injectable 40 milliGRAM(s) IV Push every 24 hours  polyethylene glycol 3350 17 Gram(s) Oral at bedtime  raloxifene 60 milliGRAM(s) Oral daily  saccharomyces boulardii 250 milliGRAM(s) Oral two times a day  senna 2 Tablet(s) Oral at bedtime  sodium chloride 0.65% Nasal 2 Spray(s) Both Nostrils three times a day  sodium chloride 0.9% Bolus 500 milliLiter(s) IV Bolus once  vancomycin  IVPB 750 milliGRAM(s) IV Intermittent every 12 hours    PRN MEDICATIONS  acetaminophen     Tablet .. 650 milliGRAM(s) Oral every 8 hours PRN  aluminum hydroxide/magnesium hydroxide/simethicone Suspension 30 milliLiter(s) Oral every 4 hours PRN  ondansetron Injectable 4 milliGRAM(s) IV Push every 8 hours PRN    VITALS:   T(F): 98.4  HR: 107  BP: 110/72  RR: 20  SpO2: 100%    PHYSICAL EXAM:  GENERAL:   ( x ) NAD, lying in bed comfortably     (  ) obtunded     (  ) lethargic     (  ) somnolent    HEAD:   ( x ) Atraumatic     (  ) hematoma     (  ) laceration (specify location:       )     NECK:  ( x ) Supple     (  ) neck stiffness     (  ) nuchal rigidity     (  )  no JVD     (  ) JVD present ( -- cm)    HEART:  Rate -->     ( x) normal rate     (  ) bradycardic     (  ) tachycardic  Rhythm -->     (x  ) regular     (  ) regularly irregular     (  ) irregularly irregular  Murmurs -->     ( x) normal s1s2     (  ) systolic murmur     (  ) diastolic murmur     (  ) continuous murmur      (  ) S3 present     (  ) S4 present    LUNGS:   ( x )Unlabored respirations     (  ) tachypnea  (  ) B/L air entry     (x  ) decreased breath sounds bilaterally   ( x ) no adventitious sound     (  ) crackles     (  ) wheezing      (  ) rhonchi      (specify location:       )  (  ) chest wall tenderness (specify location:       )    ABDOMEN:   ( x ) Soft     (  ) tense   |   ( x ) nondistended     (  ) distended   |   (x  ) +BS     (  ) hypoactive bowel sounds     (  ) hyperactive bowel sounds  ( x ) nontender     (  ) RUQ tenderness     (  ) RLQ tenderness     (  ) LLQ tenderness     (  ) epigastric tenderness     (  ) diffuse tenderness  (  ) Splenomegaly      (  ) Hepatomegaly      (  ) Jaundice     (  ) ecchymosis     EXTREMITIES:  ( x ) Normal     (  ) Rash     (  ) ecchymosis     (  ) varicose veins      (  ) pitting edema     (  ) non-pitting edema   (  ) ulceration     (  ) gangrene:     (location:     )    NERVOUS SYSTEM:    Patient known to have down syndrome , full neuro exam could not be done     SKIN:   (  x) No rashes or lesions     (  ) maculopapular rash     (  ) pustules     (  ) vesicles     (  ) ulcer     (  ) ecchymosis     (specify location:     )      LABS:                        9.1    5.86  )-----------( 498      ( 18 Mar 2024 06:14 )             30.5     03-18    133<L>  |  95<L>  |  12  ----------------------------<  166<H>  3.5   |  29  |  0.6<L>    Ca    9.5      18 Mar 2024 06:14    TPro  6.9  /  Alb  3.2<L>  /  TBili  0.3  /  DBili  x   /  AST  11  /  ALT  10  /  AlkPhos  83  03-18      Urinalysis Basic - ( 18 Mar 2024 06:14 )    Color: x / Appearance: x / SG: x / pH: x  Gluc: 166 mg/dL / Ketone: x  / Bili: x / Urobili: x   Blood: x / Protein: x / Nitrite: x   Leuk Esterase: x / RBC: x / WBC x   Sq Epi: x / Non Sq Epi: x / Bacteria: x            Culture - Blood (collected 15 Mar 2024 11:43)  Source: .Blood None  Preliminary Report (17 Mar 2024 23:01):    No growth at 48 Hours          RADIOLOGY:

## 2024-03-18 NOTE — PROGRESS NOTE ADULT - SUBJECTIVE AND OBJECTIVE BOX
HPI: 58F with Down syndrome, nonverbal at baseline, hypothyroidism, CP, and seizure disorder here from [facility] with fever and respiratory distress. Found to be septic on admission, from CAP/aspiration PNA, on IV vanco and meropenem, with course c/b continued fevers, and bacteremia with S. hominis. Also failed FEES, has NGT in place and will likely need PEG. Is requiring HFNC alternating with BiPAP. Is also on midodrine for hypotension. Patient is full code. Of note patient is under [facility]. Palliative care consulted for [facility].    INTERVAL EVENTS  2/29: patient appears comfortable overall, group home staff at bedside  3/1: patient appears more comfortable today, no objective signs of pain or discomfort  3/4: patient appears comfortable  3/13: patient with increasing O2 requirement today, restarted on IV abx  3/14: patient on HFNC, appears comfortable  3/15: on HFNC, upgraded to 2A/CEU  3/18: patient comfortable, on NC, more alert    ADVANCE DIRECTIVES:    [ ] Full Code [X ] DNR  MOLST  [ ]  Living Will  [ ]   DECISION MAKER(s):  [ ] Health Care Proxy(s)  [ ] Surrogate(s)  [ ] Guardian           Name(s): Phone Number(s): [facility]    BASELINE (I)ADL(s) (prior to admission):  [place]: [ ]Total  [ ] Moderate [ ]Dependent  Palliative Performance Status Version 2:         %    [email or web address]    Allergies    No Known Allergies    Intolerances    MEDICATIONS  (STANDING):  acetylcysteine 20%  Inhalation 4 milliLiter(s) Inhalation every 6 hours  albuterol/ipratropium for Nebulization 3 milliLiter(s) Nebulizer every 6 hours  AQUAPHOR (petrolatum Ointment) 1 Application(s) Topical two times a day  artificial  tears Solution 1 Drop(s) Both EYES two times a day  calcium carbonate   1250 mG (OsCal) 1 Tablet(s) Oral two times a day  chlorhexidine 2% Cloths 1 Application(s) Topical daily  chlorhexidine 2% Cloths 1 Application(s) Topical daily  enoxaparin Injectable 50 milliGRAM(s) SubCutaneous every 12 hours  erythromycin   Ointment 1 Application(s) Both EYES daily  gabapentin 300 milliGRAM(s) Oral every 12 hours  lactated ringers. 1000 milliLiter(s) (60 mL/Hr) IV Continuous <Continuous>  levETIRAcetam  IVPB 500 milliGRAM(s) IV Intermittent two times a day  levoFLOXacin IVPB 750 milliGRAM(s) IV Intermittent every 24 hours  levoFLOXacin IVPB      melatonin 3 milliGRAM(s) Oral at bedtime  meropenem  IVPB 1000 milliGRAM(s) IV Intermittent every 8 hours  methylPREDNISolone sodium succinate Injectable 40 milliGRAM(s) IV Push daily  midodrine. 20 milliGRAM(s) Oral three times a day  pantoprazole  Injectable 40 milliGRAM(s) IV Push every 24 hours  polyethylene glycol 3350 17 Gram(s) Oral at bedtime  raloxifene 60 milliGRAM(s) Oral daily  saccharomyces boulardii 250 milliGRAM(s) Oral two times a day  senna 2 Tablet(s) Oral at bedtime  sodium chloride 0.65% Nasal 2 Spray(s) Both Nostrils three times a day  sodium chloride 0.9% Bolus 500 milliLiter(s) IV Bolus once  vancomycin  IVPB 750 milliGRAM(s) IV Intermittent every 12 hours    MEDICATIONS  (PRN):  acetaminophen     Tablet .. 650 milliGRAM(s) Oral every 8 hours PRN Temp greater or equal to 38.5C (101.3F)  aluminum hydroxide/magnesium hydroxide/simethicone Suspension 30 milliLiter(s) Oral every 4 hours PRN Dyspepsia  ondansetron Injectable 4 milliGRAM(s) IV Push every 8 hours PRN Nausea and/or Vomiting        PRESENT SYMPTOMS: [X ]Unable to obtain due to poor mentation   Source if other than patient:  [ ]Family   [ ]Team     Pain: [ ]yes [ ]no  QOL impact -   Location -                    Aggravating factors -  Quality -  Radiation -  Timing-  Severity (0-10 scale):  Minimal acceptable level (0-10 scale):     CPOT:    https://www.[facility].org/getattachment/svv16a85-1o8v-3j9h-0w1k-7499h2169x6k/Critical-Care-Pain-Observation-Tool-(CPOT)    PAIN AD Score: 0  [email or web address] (press ctrl +  left click to view)    Dyspnea:                           [ ]None[ ]Mild [ ]Moderate [ ]Severe     Respiratory Distress Observation Scale (RDOS): 0  A score of 0 to 2 signifies little or no respiratory distress, 3 signifies mild distress, scores 4 to 6 indicate moderate distress, and scores greater than 7 signify severe distress  [email or web address]    Anxiety:                             [ ]None[ ]Mild [ ]Moderate [ ]Severe   Fatigue:                             [ ]None[ ]Mild [ ]Moderate [ ]Severe   Nausea:                             [ ]None[ ]Mild [ ]Moderate [ ]Severe   Loss of appetite:              [ ]None[ ]Mild [ ]Moderate [ ]Severe   Constipation:                    [ ]None[ ]Mild [ ]Moderate [ ]Severe    Other Symptoms:  [ ]All other review of systems negative     Palliative Performance Status Version 2:         %    [email or web address]  PHYSICAL EXAM:  Vital Signs Last 24 Hrs  T(C): 36.7 (18 Mar 2024 11:33), Max: 37.1 (17 Mar 2024 21:01)  T(F): 98.1 (18 Mar 2024 11:33), Max: 98.8 (17 Mar 2024 21:01)  HR: 113 (18 Mar 2024 11:33) (92 - 123)  BP: 110/80 (18 Mar 2024 11:33) (108/64 - 151/87)  BP(mean): 91 (18 Mar 2024 11:33) (85 - 91)  RR: 20 (18 Mar 2024 11:33) (20 - 20)  SpO2: 100% (18 Mar 2024 11:33) (97% - 100%)    Parameters below as of 18 Mar 2024 11:33  Patient On (Oxygen Delivery Method): nasal cannula, high flow    GENERAL:  [X ] No acute distress [ ]Lethargic  [ ]Unarousable  [ ]Verbal  [ ]Non-Verbal [ ]Cachexia    BEHAVIORAL/PSYCH:  [ ]Alert and Oriented x  [ ] Anxiety [ ] Delirium [ ] Agitation [X ] Calm   EYES: [ X] No scleral icterus [ ] Scleral icterus [ ] Closed  ENMT:  [ ]Dry mouth  [ ]No external oral lesions [ X] No external ear or nose lesions  CARDIOVASCULAR:  [ ]Regular [ ]Irregular [ ]Tachy [ ]Not Tachy  [ ]Raheem [ ] Edema [ ] No edema  PULMONARY:  [ ]Tachypnea  [ ]Audible excessive secretions [X ] No labored breathing [ ] labored breathing  GASTROINTESTINAL: [ ]Soft  [ ]Distended  [ X]Not distended [ ]Non tender [ ]Tender  MUSCULOSKELETAL: [ ]No clubbing [ ] clubbing  [ X] No cyanosis [ ] cyanosis  NEUROLOGIC: [ ]No focal deficits  [ ]Follows commands  [ ]Does not follow commands  [X ]Cognitive impairment  [ ]Dysphagia  [ ]Dysarthria  [ ]Paresis   SKIN: [ ] Jaundiced [X ] Non-jaundiced [ ]Rash [ ]No Rash [ ] Warm [ ] Dry  MISC/LINES: [ ] ET tube [ ] Trach [ ]NGT/OGT [ ]PEG [ ]Madsen    CRITICAL CARE:  [ ] Shock Present  [ ]Septic [ ]Cardiogenic [ ]Neurologic [ ]Hypovolemic  [ ]  Vasopressors [ ]  Inotropes   [ ]Respiratory failure present [ ]Mechanical ventilation [ ]Non-invasive ventilatory support [ ]High flow  [ ]Acute  [ ]Chronic [ ]Hypoxic  [ ]Hypercarbic [ ]Other  [ ]Other organ failure     LABS: reviewed by me                          9.1    5.86  )-----------( 498      ( 18 Mar 2024 06:14 )             30.5     03-18    133<L>  |  95<L>  |  12  ----------------------------<  166<H>  3.5   |  29  |  0.6<L>    Ca    9.5      18 Mar 2024 06:14            RADIOLOGY & ADDITIONAL STUDIES: reviewed by m    CXR 2/5/24    Support devices: Enteric tube satisfactory position.    Cardiac/ Mediastinum: unremarkable    Lungs/ Pleura: Diminishing left lung opacity/effusion. Stable smaller   right basilar opacity. No pneumothorax.    Skeletal/ soft tissues: Stable    PROTEIN CALORIE MALNUTRITION PRESENT: [ ]mild [ ]moderate [ ]severe [ ]underweight [ ]morbid obesity  https://www.andeal.org/vault/2440/web/files/ONC/Table_Clinical%20Characteristics%20to%20Document%20Malnutrition-White%20JV%20et%20al%202012.pdf    Height (cm): 136.4 (01-24-24 @ 09:52), 154.9 (11-17-23 @ 15:00), 145 (10-02-23 @ 12:00)  Weight (kg): 52.3 (01-21-24 @ 08:00), 60 (11-17-23 @ 15:00), 55.3 (10-02-23 @ 12:00)  BMI (kg/m2): 28.1 (01-24-24 @ 09:52), 21.8 (01-21-24 @ 08:00), 25 (11-17-23 @ 15:00)    [ ]PPSV2 < or = to 30% [ ]significant weight loss  [ ]poor nutritional intake  [ ]anasarca      [ ]Artificial Nutrition      Palliative Care Spiritual/Emotional Screening Tool Question  Severity (0-4):                    OR                    [X ] Unable to determine/NA  Score of 2 or greater indicates recommendation of Chaplaincy referral  Chaplaincy Referral: [ ] Yes [ ] Refused [ ] Following     Caregiver [place]:  [ ] Yes [ ] No    OR    [x ] Unable to determine. Will assess at later time if appropriate.  Social Work Referral [ ]  Patient and Family Centered Care Referral [ ]    Anticipatory Grief Present: [ ] Yes [ ] No    OR     [ x] Unable to determine. Will assess at later time if appropriate.  Social Work Referral [ ]  Patient and Family Centered Care Referral [ ]    REFERRALS:   [ ]Chaplaincy  [ ]Hospice  [ ]Child Life  [ ]Social Work  [ ]Case management [ ]Holistic Therapy     Palliative care education provided to patient and/or family    Goals of Care Document:     ______________  Wu Jenkins MD  Palliative Medicine  [facility]   of Geriatric and Palliative Medicine  [phone]

## 2024-03-18 NOTE — PROGRESS NOTE ADULT - ASSESSMENT
# Sepsis POA / Acute hypoxic resp failure / RSV bronchiolitis /  H/o Dysphagia/ suspected aspiration    - continue midodrine 20 Q8H  - completed caspo (end 3/10) per ID   - Failed FEES,  NG tube for feedings and medications, patient might  need PEG tube once 02 requirements decrease ( in case if level of care is still the same, consumer advisory board agreed for CMO ( paperwork in the the paper chart)   - c/w duoneb, chest PT, aspiration precautions   - pulmonary is following  - aggressive chest PT with vest, aspiration precautions and suction   - per ID, continue meropenem 1g q8h IV and add levaquin 750mg q24h      #  Elevated Troponin , type 2 MI/  Sinus tachycardia  - c/w telemetry monitoring   - Repeat EKG: Normal sinus rhythm  - c/w  metoprolol  - TTE 2/19 normal EF no DD or valve abnormalities    # Seizure Disorder  - c/w  Keppra   - seizure precautions  - keep Mg above 2.0     # H/o lower ext DVT   - c/w therapeutic Lovenox, Eliquis on dc    #  Hypothyroidism  - TSH 0.51    # Normocytic Anemia, anemia of chronic disease   - monitor H/H, keep Hb above 7.5     # Down Syndrome/  Cerebral Palsy/ functional quadriplegia   - supportive care  - prevent falls and aspiration   - failed speech and swallow, needs PEG as above  - on Raloxifene     Palliative follow up, patient is declining, if there is no improvement will change level of care to CMO ( change in level of care was approved by Consumer Advisory Board [place] [employer], paperwork in the paper chart)     #Progress Note Handoff  Pending (specify):  f/u MRSA nares (DC vanco if negative), repeat BCx, sputum culture if possible, aggressive pulmonary toilet, chest pt, aspiration precautions, c/w danna and add levaquin as per ID, palliative follow up to change the level of care ( see above) to CMO  DISPO : MED/Surg    # Sepsis POA / Acute hypoxic resp failure / RSV bronchiolitis /  H/o Dysphagia/ suspected aspiration    - continue midodrine 20 Q8H  - completed caspo (end 3/10) per ID   - Failed FEES,  NG tube for feedings and medications, patient might  need PEG tube once 02 requirements decrease ( in case if level of care is still the same, consumer advisory board agreed for CMO ( paperwork in the the paper chart)   - c/w duoneb, chest PT, aspiration precautions   - pulmonary is following  - aggressive chest PT with vest, aspiration precautions and suction   - per ID, continue meropenem 1g q8h IV and add levaquin 750mg q24h      #  Elevated Troponin , type 2 MI/  Sinus tachycardia  - c/w telemetry monitoring   - Repeat EKG: Normal sinus rhythm  - c/w  metoprolol  - TTE 2/19 normal EF no DD or valve abnormalities    # Seizure Disorder  - c/w  Keppra   - seizure precautions  - keep Mg above 2.0     # H/o lower ext DVT   - c/w therapeutic Lovenox, Eliquis on dc    #  Hypothyroidism  - TSH 0.51    # Normocytic Anemia, anemia of chronic disease   - monitor H/H, keep Hb above 7.5     # Down Syndrome/  Cerebral Palsy/ functional quadriplegia   - supportive care  - prevent falls and aspiration   - failed speech and swallow, needs PEG as above  - on Raloxifene     Palliative follow up, patient is declining, if there is no improvement will change level of care to CMO ( change in level of care was approved by Consumer Advisory Board [place] [employer], paperwork in the paper chart)

## 2024-03-18 NOTE — PROGRESS NOTE ADULT - SUBJECTIVE AND OBJECTIVE BOX
Patient is a 58y old  Female who presents with a chief complaint of Respiratory Distress (18 Mar 2024 08:22)        Over Night Events: on 6 L Nc. sating 97%. off pressors.        ROS:     All ROS are negative except HPI         PHYSICAL EXAM    ICU Vital Signs Last 24 Hrs  T(C): 36.9 (18 Mar 2024 07:57), Max: 37.1 (17 Mar 2024 21:01)  T(F): 98.4 (18 Mar 2024 07:57), Max: 98.8 (17 Mar 2024 21:01)  HR: 107 (18 Mar 2024 07:57) (92 - 123)  BP: 110/72 (18 Mar 2024 07:57) (93/77 - 151/87)  BP(mean): 85 (18 Mar 2024 07:57) (85 - 85)  RR: 20 (18 Mar 2024 07:57) (20 - 20)  SpO2: 100% (18 Mar 2024 08:26) (97% - 100%)    O2 Parameters below as of 18 Mar 2024 08:26  Patient On (Oxygen Delivery Method): nasal cannula, high flow  O2 Flow (L/min): 40  O2 Concentration (%): 40        ENT: Airway patent,  EYES: Pupils equal, Round and reactive to light.  CARDIAC: Normal rate, Regular rhythm.    RESPIRATORY: No wheezing, Bilateral crackles, Not tachypneic, No use of accessory muscles  GASTROINTESTINAL: Abdomen soft, Non-tender, No guarding,   NEUROLOGICAL: Alert and awake.  SKIN: Skin normal color for race, Warm and dry and intact.         03-17-24 @ 07:01  -  03-18-24 @ 07:00  --------------------------------------------------------  IN:  Total IN: 0 mL    OUT:    Voided (mL): 1401 mL  Total OUT: 1401 mL    Total NET: -1401 mL        LABS:                            9.1    5.86  )-----------( 498      ( 18 Mar 2024 06:14 )             30.5                                               03-18    133<L>  |  95<L>  |  12  ----------------------------<  166<H>  3.5   |  29  |  0.6<L>    Ca    9.5      18 Mar 2024 06:14    TPro  6.9  /  Alb  3.2<L>  /  TBili  0.3  /  DBili  x   /  AST  11  /  ALT  10  /  AlkPhos  83  03-18                                             Urinalysis Basic - ( 18 Mar 2024 06:14 )    Color: x / Appearance: x / SG: x / pH: x  Gluc: 166 mg/dL / Ketone: x  / Bili: x / Urobili: x   Blood: x / Protein: x / Nitrite: x   Leuk Esterase: x / RBC: x / WBC x   Sq Epi: x / Non Sq Epi: x / Bacteria: x                                                  LIVER FUNCTIONS - ( 18 Mar 2024 06:14 )  Alb: 3.2 g/dL / Pro: 6.9 g/dL / ALK PHOS: 83 U/L / ALT: 10 U/L / AST: 11 U/L / GGT: x                                                  Culture - Blood (collected 15 Mar 2024 11:43)  Source: .Blood None  Preliminary Report (17 Mar 2024 23:01):    No growth at 48 Hours                                                                                           MEDICATIONS  (STANDING):  acetylcysteine 20%  Inhalation 4 milliLiter(s) Inhalation every 6 hours  albuterol/ipratropium for Nebulization 3 milliLiter(s) Nebulizer every 6 hours  AQUAPHOR (petrolatum Ointment) 1 Application(s) Topical two times a day  artificial  tears Solution 1 Drop(s) Both EYES two times a day  calcium carbonate   1250 mG (OsCal) 1 Tablet(s) Oral two times a day  chlorhexidine 2% Cloths 1 Application(s) Topical daily  chlorhexidine 2% Cloths 1 Application(s) Topical daily  enoxaparin Injectable 50 milliGRAM(s) SubCutaneous every 12 hours  erythromycin   Ointment 1 Application(s) Both EYES daily  gabapentin 300 milliGRAM(s) Oral every 12 hours  levETIRAcetam  IVPB 500 milliGRAM(s) IV Intermittent two times a day  levoFLOXacin IVPB 750 milliGRAM(s) IV Intermittent every 24 hours  levoFLOXacin IVPB      melatonin 3 milliGRAM(s) Oral at bedtime  meropenem  IVPB 1000 milliGRAM(s) IV Intermittent every 8 hours  methylPREDNISolone sodium succinate Injectable 40 milliGRAM(s) IV Push daily  midodrine. 20 milliGRAM(s) Oral three times a day  pantoprazole  Injectable 40 milliGRAM(s) IV Push every 24 hours  polyethylene glycol 3350 17 Gram(s) Oral at bedtime  raloxifene 60 milliGRAM(s) Oral daily  saccharomyces boulardii 250 milliGRAM(s) Oral two times a day  senna 2 Tablet(s) Oral at bedtime  sodium chloride 0.65% Nasal 2 Spray(s) Both Nostrils three times a day  sodium chloride 0.9% Bolus 500 milliLiter(s) IV Bolus once  vancomycin  IVPB 750 milliGRAM(s) IV Intermittent every 12 hours    MEDICATIONS  (PRN):  acetaminophen     Tablet .. 650 milliGRAM(s) Oral every 8 hours PRN Temp greater or equal to 38.5C (101.3F)  aluminum hydroxide/magnesium hydroxide/simethicone Suspension 30 milliLiter(s) Oral every 4 hours PRN Dyspepsia  ondansetron Injectable 4 milliGRAM(s) IV Push every 8 hours PRN Nausea and/or Vomiting

## 2024-03-18 NOTE — CHART NOTE - NSCHARTNOTEFT_GEN_A_CORE
Received report by nurse that there is resistance with NGT. Prior CXR was unremarkable. Patient seen at bedside, NGT resistance noted. NGT was discarded and replaced. CXR ordered, please follow up for placement confirmation.

## 2024-03-18 NOTE — PROGRESS NOTE ADULT - ASSESSMENT
ASSESSMENT  58F w/ PMHx Down Syndrome, nonverbal at baseline, Hypothyroidism, Cerebral palsy and Seizure Disorders presents to the ED from nursing facility/group home presented to ED for respiratory distress and high grade fever.     IMPRESSION  #Bacteremia, Staphylococcus epidermidis, Methicillin resistant: Detec    3/15 BCX NG    3/13 BCX Staphylococcus epidermidis , has midline  #Hypoxemia    3/13 CT Chest Extensive patchy  left-sided pulmonary opacities, slightly decreased from   prior CT. Patchy right-sided opacities also demonstrated. Findings are   likely infectious in etiology. Recommend follow-up.    CXR 3/13 Bilateral interstitial densities may reflect vascular congestion.  #HAP / aspiration with cavitary PNA  < from: CT Chest w/ IV Cont (02.21.24 @ 00:09) >  Bilateral pneumonia, left worse than right, with a left upper lobe   thick-walled cavity. Diffuse opacification of the   left lung is seen with what appears to be some mucus plugging centrally   within the left mainstem bronchus. There is a pleural effusion.   Reactive mediastinal   lymph nodes are seen.    MRSA PCR Result.: Negative (02-20-24 @ 13:42)    Procalcitonin, Serum: 0.16 (02-19-24 @ 16:00)- unremarkable     2/17 BCX NGTD     Rapid RVP Result: NotDetec (02-17-24 @ 19:06)    2/12 BCX NGTD    Procalcitonin, Serum: 0.10 (02-12-24 @ 11:17)    Rapid RVP Result: NotDetec (02-12-24 @ 10:28)    MRSA PCR Result.: Negative (02-12-24 @ 10:28)    2/9 BCX NGTD x2    2/3 BCX NGTD     2/1 BCX NGTD     2/1 UCX   >=3 organisms. Probable collection contamination.    1/31 BCX NG    Rapid RVP Result: NotDetec (02-04-24 @ 10:28)    MRSA PCR Result.: Negative (02-03-24 @ 21:32)     UA without significant pyuria   Procalcitonin, Serum: 0.22 (02-01-24 @ 16:00)--> Procalcitonin, Serum: 0.15 (02-03-24 @ 11:13), downtrending ; unremarkable   MRSA PCR Result.: Negative (01-22-24 @ 05:30)  < from: Xray Chest 1 View-PORTABLE IMMEDIATE (Xray Chest 1 View-PORTABLE IMMEDIATE .) (02.01.24 @ 03:02) >  Bibasal opacities without significant change.    quantiferon gold negative  #Acute hypoxic respiratory failure- Gram Negative pneumonia   #1/20 BCX 1/4 bottles : Staphylococcus hominis- contaminant   Repeat CX NG   #Severe Sepsis on admission  #RSV + 1/21  #Elevated fungitell   serum aspergillus galactomannan and histo are (-), not at risk of PCP  Fungitell: 76 (02-19-24 @ 16:00)  Fungitell: 63 (02-06-24 @ 11:27)  Fungitell: 325 (01-20-24 @ 21:23)  Fungitell: 138 (11-07-23 @ 08:08)  Fungitell: 115 (11-02-23 @ 11:40)  Fungitell: >500 pg/mL (10.17.23 @ 17:20) s/p empiric caspo   #Full thickness ulcer right heel- Appreciate burn/podiatry evaluation.  #History of Right planter foot ulcers - two full thickness ulcers - serous drainage with mild erythema with OM  - MR Foot No Cont, Right (10.16.23 @ 21:51): IMPRESSION: 1.  Limited exam. 2.  Osteomyelitis of the first metatarsal stump. 3.  Osteomyelitis of the second toe distal phalanx.  - s/p excidional debridement to and including bone 1st metatarsal with partial 2nd digit amputation - 1st metatarsal head resected - Wound Cx Proteus ESBL   #CKD 2-3 Creatinine: 0.7 mg/dL (02.02.24 @ 04:59)  #History of buttock ulcer  #Down syndrome/Cerebral Palsy     RECOMMENDATIONS  - midline 2/26, recommend removing as CONS and would shorten antibiotic duration  - Vanc dosing AUC/ZANE per clinical pharmacist x 14 days, or remove midline x 5 days from cleared BCX  - fungitell   - Sputum cx if possible  - Continue Meropenem 1g q8h IV  - Continue Levaquin 750mg q24h IV , monitor QTC   - x 7 days end 3/20  - Doubt fungal in nature , fungal workup previously negative, f/u fungitell   - Grave prognosis, aggressive care is futile    If any questions, please send a message or call on [email or web address] Teams  Please continue to update ID with any pertinent new laboratory or radiographic findings.

## 2024-03-18 NOTE — PROGRESS NOTE ADULT - ASSESSMENT
IMPRESSION:    Acute hypoxemic respiratory failure improving   PNA s/p ABx  Sepsis POA  Anemia  Septic shock, off Levophed   Recurrent aspiration pneumonia / prior intubation  Elevated fungitell s/p Caspo  HO DVT on Eliquis   HO GI bleed  HO OM  HO recent duodenal perforation   HO polymicrobial bacteremia   H/o CP, DS  H/o seizures    PLAN:    CNS:  Avoid CNS Depressant, AED. MS at baseline.    HEENT: Oral care. Eye drops.    PULMONARY: HOB at 45 degrees.  Aspiration precaution. Wean FiO2 Keep spo2 92 TO 96%. CHEST PT. Nebs q6 .    CARDIOVASCULAR: Keep MAP more than 60. Avoid overload. C/w midodrine.    GI: Protonix. NG feeding. Consent for PEG    INFECTIOUS DISEASE:  ABX and Anti fungal per ID. Septic w/u. Repeat MRSA. F/u repeat fungitell. Remove midline, place new midline.    HEMATOLOGICAL:  Lovenox therapeutic.  Monitor CBC.    ENDOCRINE:  Follow up FS.  Insulin protocol if needed.    MUSCULOSKELETAL: Bedrest.  Off loading.  Wound care.    Prognosis very poor.  Palliative care following  dnr/i, possible CMO  Aggressive care is futile at this point  DGTF IMPRESSION:    Acute hypoxemic respiratory failure improving   PNA s/p ABx  Sepsis POA  Anemia  Septic shock, off Levophed   Recurrent aspiration pneumonia / prior intubation  Elevated fungitell s/p Caspo  HO DVT on Eliquis   HO GI bleed  HO OM  HO recent duodenal perforation   HO polymicrobial bacteremia   H/o CP, DS  H/o seizures    PLAN:    CNS:  Avoid CNS Depressant, AED. MS at baseline.    HEENT: Oral care. Eye drops.    PULMONARY: HOB at 45 degrees.  Aspiration precaution. Wean FiO2 Keep spo2 92 TO 96%. CHEST PT. Nebs q6 .    CARDIOVASCULAR: Keep MAP more than 60. Avoid overload. C/w midodrine.    GI: Protonix. NG feeding. Consent for PEG    INFECTIOUS DISEASE:  ABX and Anti fungal per ID. Septic w/u. Repeat MRSA. F/u repeat fungitell.    HEMATOLOGICAL:  Lovenox therapeutic.  Monitor CBC.    ENDOCRINE:  Follow up FS.  Insulin protocol if needed.    MUSCULOSKELETAL: Bedrest.  Off loading.  Wound care.    Prognosis very poor.  Palliative care following  dnr/i, possible CMO  Aggressive care is futile at this point  DGTF

## 2024-03-18 NOTE — PROGRESS NOTE ADULT - ASSESSMENT
58F w/ PMHx Down Syndrome, nonverbal at baseline, Hypothyroidism, Cerebral palsy and Seizure Disorders presents to the ED from nursing facility/group home ([facility]) presented to ED for respiratory distress and high grade fever. Patient found to be septic on admission. Admitted to SDU for management of acute respiratory distress 2/2 CAP/Aspiration PNA (20 Jan 2024 18:22)  While pt was on the floor she developed dyspnea  consulted by pulmonary/critical care and was upgraded back to SDU.      A/P  # Sepsis POA / Acute hypoxic resp failure / RSV bronchiolitis /  H/o Dysphagia/ suspected aspiration    -  taper off   high flow oxygen   - aggressive pulmonary toilet, chest PT Q 4 hours, aspiration precautions, frequent suction   - ID is following, recommendations noted:   - Remove midline ( was put in on 2/26)   - Vanc dosing AUC/ZANE per clinical pharmacist x 14 days, or remove midline x 5 days from cleared BCX  - fungitell   - Sputum cx if possible  - Continue Meropenem 1g q8h IV  - Continue Levaquin 750mg q24h IV , monitor QTC   - x 7 days end 3/20  - Doubt fungal in nature , fungal workup previously negative, f/u fungitell   - Failed FEES,  NG tube for feedings and medications  - pulmonary is following  - c/w IV steroids, solumedrol 40 mg IV Q 24 hours   - Grave prognosis, aggressive care is futile  - pt removed NGT, unable to put NGT after multiple attempts, please, obtain consent for PEG tube from advisory board.       # Seizure Disorder  - c/w  Keppra   - seizure precautions  - keep Mg above 2.0     # H/o lower ext DVT   - c/w therapeutic Lovenox, Eliquis on dc    #  Hypothyroidism  - TSH 0.51    # Normocytic Anemia, anemia of chronic disease   - monitor H/H, keep Hb above 7.5     # Down Syndrome/  Cerebral Palsy/ functional quadriplegia   - supportive care  - prevent falls and aspiration   - failed speech and swallow, might  need  PEG ( see above)  - on Raloxifene     Palliative care team is following ,  pt is  DNR/DNI ( approved by [facility]) , obtain consent  for PEG tube from Consumer Advisory Board [place] [employer]      #Progress Note Handoff  Pending (specify):  f/u ID recommendations ( see above), remove midline,  obtain consent from advisory board for PEG tube, supportive care   Family discussion: n/a  Disposition: DGTF

## 2024-03-18 NOTE — PROGRESS NOTE ADULT - ASSESSMENT
58F with Down syndrome, nonverbal at baseline, hypothyroidism, CP, and seizure disorder here from [facility] with fever and respiratory distress. Found to be septic on admission, from CAP/aspiration PNA, on IV vanco and meropenem, with course c/b continued fevers, and bacteremia with S. hominis. Also failed FEES, has NGT in place and will likely need PEG. Is requiring HFNC alternating with BiPAP. Is also on midodrine for hypotension. Patient is full code. Of note patient is under [place] [facility]. Palliative care consulted for [facility].

## 2024-03-19 PROCEDURE — 99233 SBSQ HOSP IP/OBS HIGH 50: CPT

## 2024-03-19 PROCEDURE — 71045 X-RAY EXAM CHEST 1 VIEW: CPT | Mod: 26

## 2024-03-19 PROCEDURE — 99232 SBSQ HOSP IP/OBS MODERATE 35: CPT

## 2024-03-19 RX ADMIN — CHLORHEXIDINE GLUCONATE 1 APPLICATION(S): 213 SOLUTION TOPICAL at 11:36

## 2024-03-19 RX ADMIN — MEROPENEM 100 MILLIGRAM(S): 1 INJECTION INTRAVENOUS at 11:38

## 2024-03-19 RX ADMIN — Medication 1 DROP(S): at 05:41

## 2024-03-19 RX ADMIN — Medication 1 APPLICATION(S): at 11:37

## 2024-03-19 RX ADMIN — Medication 2 SPRAY(S): at 13:15

## 2024-03-19 RX ADMIN — Medication 4 MILLILITER(S): at 19:47

## 2024-03-19 RX ADMIN — Medication 40 MILLIGRAM(S): at 05:44

## 2024-03-19 RX ADMIN — ENOXAPARIN SODIUM 50 MILLIGRAM(S): 100 INJECTION SUBCUTANEOUS at 05:42

## 2024-03-19 RX ADMIN — Medication 3 MILLILITER(S): at 19:46

## 2024-03-19 RX ADMIN — Medication 250 MILLIGRAM(S): at 17:39

## 2024-03-19 RX ADMIN — MEROPENEM 100 MILLIGRAM(S): 1 INJECTION INTRAVENOUS at 22:13

## 2024-03-19 RX ADMIN — Medication 2 SPRAY(S): at 06:28

## 2024-03-19 RX ADMIN — POLYETHYLENE GLYCOL 3350 17 GRAM(S): 17 POWDER, FOR SOLUTION ORAL at 22:16

## 2024-03-19 RX ADMIN — LEVETIRACETAM 400 MILLIGRAM(S): 250 TABLET, FILM COATED ORAL at 17:48

## 2024-03-19 RX ADMIN — GABAPENTIN 300 MILLIGRAM(S): 400 CAPSULE ORAL at 17:37

## 2024-03-19 RX ADMIN — Medication 250 MILLIGRAM(S): at 05:51

## 2024-03-19 RX ADMIN — Medication 2 SPRAY(S): at 22:15

## 2024-03-19 RX ADMIN — LEVETIRACETAM 400 MILLIGRAM(S): 250 TABLET, FILM COATED ORAL at 05:40

## 2024-03-19 RX ADMIN — Medication 250 MILLIGRAM(S): at 17:42

## 2024-03-19 RX ADMIN — Medication 1 DROP(S): at 17:39

## 2024-03-19 RX ADMIN — Medication 4 MILLILITER(S): at 14:46

## 2024-03-19 RX ADMIN — ENOXAPARIN SODIUM 50 MILLIGRAM(S): 100 INJECTION SUBCUTANEOUS at 17:37

## 2024-03-19 RX ADMIN — SODIUM CHLORIDE 60 MILLILITER(S): 9 INJECTION, SOLUTION INTRAVENOUS at 22:12

## 2024-03-19 RX ADMIN — Medication 1 TABLET(S): at 17:38

## 2024-03-19 RX ADMIN — SODIUM CHLORIDE 60 MILLILITER(S): 9 INJECTION, SOLUTION INTRAVENOUS at 05:41

## 2024-03-19 RX ADMIN — SENNA PLUS 2 TABLET(S): 8.6 TABLET ORAL at 22:15

## 2024-03-19 RX ADMIN — CHLORHEXIDINE GLUCONATE 1 APPLICATION(S): 213 SOLUTION TOPICAL at 11:35

## 2024-03-19 RX ADMIN — MIDODRINE HYDROCHLORIDE 20 MILLIGRAM(S): 2.5 TABLET ORAL at 17:40

## 2024-03-19 RX ADMIN — PANTOPRAZOLE SODIUM 40 MILLIGRAM(S): 20 TABLET, DELAYED RELEASE ORAL at 05:42

## 2024-03-19 RX ADMIN — MEROPENEM 100 MILLIGRAM(S): 1 INJECTION INTRAVENOUS at 05:38

## 2024-03-19 RX ADMIN — Medication 1 APPLICATION(S): at 17:42

## 2024-03-19 RX ADMIN — Medication 3 MILLIGRAM(S): at 22:15

## 2024-03-19 RX ADMIN — Medication 3 MILLILITER(S): at 14:42

## 2024-03-19 RX ADMIN — Medication 1 APPLICATION(S): at 05:58

## 2024-03-19 NOTE — PROGRESS NOTE ADULT - SUBJECTIVE AND OBJECTIVE BOX
HPI: 58F with Down syndrome, nonverbal at baseline, hypothyroidism, CP, and seizure disorder here from [facility] with fever and respiratory distress. Found to be septic on admission, from CAP/aspiration PNA, on IV vanco and meropenem, with course c/b continued fevers, and bacteremia with S. hominis. Also failed FEES, has NGT in place and will likely need PEG. Is requiring HFNC alternating with BiPAP. Is also on midodrine for hypotension. Patient is full code. Of note patient is under [facility]. Palliative care consulted for [facility].    INTERVAL EVENTS  2/29: patient appears comfortable overall, group home staff at bedside  3/1: patient appears more comfortable today, no objective signs of pain or discomfort  3/4: patient appears comfortable  3/13: patient with increasing O2 requirement today, restarted on IV abx  3/14: patient on HFNC, appears comfortable  3/15: on HFNC, upgraded to 2A/CEU  3/18: patient comfortable, on NC, more alert  3/19: patient comfortable, on NC    ADVANCE DIRECTIVES:    [ ] Full Code [X ] DNR  MOLST  [ ]  Living Will  [ ]   DECISION MAKER(s):  [ ] Health Care Proxy(s)  [ ] Surrogate(s)  [ ] Guardian           Name(s): Phone Number(s): [facility]    BASELINE (I)ADL(s) (prior to admission):  [place]: [ ]Total  [ ] Moderate [ ]Dependent  Palliative Performance Status Version 2:         %    [email or web address]_performance_[email or web address]    Allergies    No Known Allergies    Intolerances    MEDICATIONS  (STANDING):  acetylcysteine 20%  Inhalation 4 milliLiter(s) Inhalation every 6 hours  albuterol/ipratropium for Nebulization 3 milliLiter(s) Nebulizer every 6 hours  AQUAPHOR (petrolatum Ointment) 1 Application(s) Topical two times a day  artificial  tears Solution 1 Drop(s) Both EYES two times a day  calcium carbonate   1250 mG (OsCal) 1 Tablet(s) Oral two times a day  chlorhexidine 2% Cloths 1 Application(s) Topical daily  chlorhexidine 2% Cloths 1 Application(s) Topical daily  enoxaparin Injectable 50 milliGRAM(s) SubCutaneous every 12 hours  erythromycin   Ointment 1 Application(s) Both EYES daily  gabapentin 300 milliGRAM(s) Oral every 12 hours  lactated ringers. 1000 milliLiter(s) (60 mL/Hr) IV Continuous <Continuous>  levETIRAcetam  IVPB 500 milliGRAM(s) IV Intermittent two times a day  levoFLOXacin IVPB      levoFLOXacin IVPB 750 milliGRAM(s) IV Intermittent every 24 hours  melatonin 3 milliGRAM(s) Oral at bedtime  meropenem  IVPB 1000 milliGRAM(s) IV Intermittent every 8 hours  methylPREDNISolone sodium succinate Injectable 40 milliGRAM(s) IV Push daily  midodrine. 20 milliGRAM(s) Oral three times a day  pantoprazole  Injectable 40 milliGRAM(s) IV Push every 24 hours  polyethylene glycol 3350 17 Gram(s) Oral at bedtime  raloxifene 60 milliGRAM(s) Oral daily  saccharomyces boulardii 250 milliGRAM(s) Oral two times a day  senna 2 Tablet(s) Oral at bedtime  sodium chloride 0.65% Nasal 2 Spray(s) Both Nostrils three times a day  sodium chloride 0.9% Bolus 500 milliLiter(s) IV Bolus once  vancomycin  IVPB 750 milliGRAM(s) IV Intermittent every 12 hours    MEDICATIONS  (PRN):  acetaminophen     Tablet .. 650 milliGRAM(s) Oral every 8 hours PRN Temp greater or equal to 38.5C (101.3F)  aluminum hydroxide/magnesium hydroxide/simethicone Suspension 30 milliLiter(s) Oral every 4 hours PRN Dyspepsia  ondansetron Injectable 4 milliGRAM(s) IV Push every 8 hours PRN Nausea and/or Vomiting    PRESENT SYMPTOMS: [X ]Unable to obtain due to poor mentation   Source if other than patient:  [ ]Family   [ ]Team     Pain: [ ]yes [ ]no  QOL impact -   Location -                    Aggravating factors -  Quality -  Radiation -  Timing-  Severity (0-10 scale):  Minimal acceptable level (0-10 scale):     CPOT:    https://www.[facility].org/getattachment/wjg46l66-9x9q-7h2r-1o2w-8548a3791v8d/Critical-Care-Pain-Observation-Tool-(CPOT)    PAIN AD Score: 0  [email or web address].[email or web address]/cog/painad.pdf (press ctrl +  left click to view)    Dyspnea:                           [ ]None[ ]Mild [ ]Moderate [ ]Severe     Respiratory Distress Observation Scale (RDOS): 0  A score of 0 to 2 signifies little or no respiratory distress, 3 signifies mild distress, scores 4 to 6 indicate moderate distress, and scores greater than 7 signify severe distress  [email or web address]://[email or web address]/sites/default/files/PDFS/112779-ryffhyhxemu-casackcx-yiofkzazkfm-yqykf.pdf    Anxiety:                             [ ]None[ ]Mild [ ]Moderate [ ]Severe   Fatigue:                             [ ]None[ ]Mild [ ]Moderate [ ]Severe   Nausea:                             [ ]None[ ]Mild [ ]Moderate [ ]Severe   Loss of appetite:              [ ]None[ ]Mild [ ]Moderate [ ]Severe   Constipation:                    [ ]None[ ]Mild [ ]Moderate [ ]Severe    Other Symptoms:  [ ]All other review of systems negative     Palliative Performance Status Version 2:         %    http://[email or web address]/news/palliative_performance_scale_[email or web address]    PHYSICAL EXAM:  Vital Signs Last 24 Hrs  T(C): 36.2 (19 Mar 2024 08:17), Max: 36.3 (18 Mar 2024 15:00)  T(F): 97.1 (19 Mar 2024 08:17), Max: 97.4 (18 Mar 2024 23:32)  HR: 70 (19 Mar 2024 08:17) (70 - 108)  BP: 106/53 (19 Mar 2024 08:17) (99/61 - 120/58)  BP(mean): --  RR: 18 (19 Mar 2024 08:17) (18 - 18)  SpO2: 98% (19 Mar 2024 08:17) (97% - 98%)    Parameters below as of 19 Mar 2024 08:17  Patient On (Oxygen Delivery Method): nasal cannula        GENERAL:  [X ] No acute distress [ ]Lethargic  [ ]Unarousable  [ ]Verbal  [ ]Non-Verbal [ ]Cachexia    BEHAVIORAL/PSYCH:  [ ]Alert and Oriented x  [ ] Anxiety [ ] Delirium [ ] Agitation [X ] Calm   EYES: [ X] No scleral icterus [ ] Scleral icterus [ ] Closed  ENMT:  [ ]Dry mouth  [ ]No external oral lesions [ X] No external ear or nose lesions  CARDIOVASCULAR:  [ ]Regular [ ]Irregular [ ]Tachy [ ]Not Tachy  [ ]Raheem [ ] Edema [ ] No edema  PULMONARY:  [ ]Tachypnea  [ ]Audible excessive secretions [X ] No labored breathing [ ] labored breathing  GASTROINTESTINAL: [ ]Soft  [ ]Distended  [ X]Not distended [ ]Non tender [ ]Tender  MUSCULOSKELETAL: [ ]No clubbing [ ] clubbing  [ X] No cyanosis [ ] cyanosis  NEUROLOGIC: [ ]No focal deficits  [ ]Follows commands  [ ]Does not follow commands  [X ]Cognitive impairment  [ ]Dysphagia  [ ]Dysarthria  [ ]Paresis   SKIN: [ ] Jaundiced [X ] Non-jaundiced [ ]Rash [ ]No Rash [ ] Warm [ ] Dry  MISC/LINES: [ ] ET tube [ ] Trach [ ]NGT/OGT [ ]PEG [ ]M[place]en    CRITICAL CARE:  [ ] Shock Present  [ ]Septic [ ]Cardiogenic [ ]Neurologic [ ]Hypovolemic  [ ]  Vasopressors [ ]  Inotropes   [ ]Respiratory failure present [ ]Mechanical ventilation [ ]Non-invasive ventilatory support [ ]High flow  [ ]Acute  [ ]Chronic [ ]Hypoxic  [ ]Hypercarbic [ ]Other  [ ]Other organ failure     LABS: reviewed by me                          9.1    5.86  )-----------( 498      ( 18 Mar 2024 06:14 )             30.5     03-18    133<L>  |  95<L>  |  12  ----------------------------<  166<H>  3.5   |  29  |  0.6<L>    Ca    9.5      18 Mar 2024 06:14    RADIOLOGY & ADDITIONAL STUDIES: reviewed by m    CXR 2/5/24    Support devices: Enteric tube satisfactory position.    Cardiac/ Mediastinum: unremarkable    Lungs/ Pleura: Diminishing left lung opacity/effusion. Stable smaller   right basilar opacity. No pneumothorax.    Skeletal/ soft tissues: Stable    PROTEIN CALORIE MALNUTRITION PRESENT: [ ]mild [ ]moderate [ ]severe [ ]underweight [ ]morbid obesity  https://www.andeal.org/vault/2440/web/files/ONC/Table_Clinical%20Characteristics%20to%20Document%20Malnutrition-White%20JV%20et%20al%202012.pdf    Height (cm): 136.4 (01-24-24 @ 09:52), 154.9 (11-17-23 @ 15:00), 145 (10-02-23 @ 12:00)  Weight (kg): 52.3 (01-21-24 @ 08:00), 60 (11-17-23 @ 15:00), 55.3 (10-02-23 @ 12:00)  BMI (kg/m2): 28.1 (01-24-24 @ 09:52), 21.8 (01-21-24 @ 08:00), 25 (11-17-23 @ 15:00)    [ ]PPSV2 < or = to 30% [ ]significant weight loss  [ ]poor nutritional intake  [ ]anasarca      [ ]Artificial Nutrition      Palliative Care Spiritual/Emotional Screening Tool Question  Severity (0-4):                    OR                    [X ] Unable to determine/NA  Score of 2 or greater indicates recommendation of Chaplaincy referral  Chaplaincy Referral: [ ] Yes [ ] Refused [ ] Following     Caregiver Medon:  [ ] Yes [ ] No    OR    [x ] Unable to determine. Will assess at later time if appropriate.  Social Work Referral [ ]  Patient and Family Centered Care Referral [ ]    Anticipatory Grief Present: [ ] Yes [ ] No    OR     [ x] Unable to determine. Will assess at later time if appropriate.  Social Work Referral [ ]  Patient and Family Centered Care Referral [ ]    REFERRALS:   [ ]Chaplaincy  [ ]Hospice  [ ]Child Life  [ ]Social Work  [ ]Case management [ ]Holistic Therapy     Palliative care education provided to patient and/or family    Goals of Care Document:     ______________  Wu Jenkins MD  Palliative Medicine  [facility]   of Geriatric and Palliative Medicine  [phone]

## 2024-03-19 NOTE — PROGRESS NOTE ADULT - PROBLEM SELECTOR PLAN 1
- nonverbal at baseline  - per [facility] staff at bedside, patient would eat by herself (pureed diet), occasionally blow kisses or pat people's hands, but could not verbalize; states she came from [place] to [facility]  - is under [place] CAB.
- supportive care  - see GOC above
- nonverbal at baseline  - per [facility] staff at bedside, patient would eat by herself (pureed diet), occasionally blow kisses or pat people's hands, but could not verbalize; states she came from [place] to [facility]  - is under [place] CAB.
- nonverbal at baseline  - per [facility] staff at bedside, patient would eat by herself (pureed diet), occasionally blow kisses or pat people's hands, but could not verbalize; states she came from [place] to [facility]  - is under [place] CAB.
- nonverbal at baseline  - per [facility] staff at bedside, patient would eat by herself (pureed diet), occasionally blow kisses or pat people's hands, but could not verbalize; states she came from [place] to [facility]  - is under [place] CAB.
- supportive care  - see prior GOC notes
- supportive care  - see GOC above
- supportive care  - see GOC above
- nonverbal at baseline  - per [facility] staff at bedside, patient would eat by herself (pureed diet), occasionally blow kisses or pat people's hands, but could not verbalize; states she came from [place] to [facility]  - is under [place] CAB.
- supportive care  - see GOC above

## 2024-03-19 NOTE — PROGRESS NOTE ADULT - SUBJECTIVE AND OBJECTIVE BOX
SUBJECTIVE:    Patient is a 58y old Female who presents with a chief complaint of Respiratory Distress (19 Mar 2024 14:33)    Currently admitted to medicine with the primary diagnosis of Sepsis with acute hypoxic respiratory failure       Today is hospital day 59d.     PAST MEDICAL & SURGICAL HISTORY  Down syndrome    Osteoporosis    Mild anemia    Neuropathy    S/P debridement  of R hip on 3/2/21      ALLERGIES:  No Known Allergies    MEDICATIONS:  STANDING MEDICATIONS  acetylcysteine 20%  Inhalation 4 milliLiter(s) Inhalation every 6 hours  albuterol/ipratropium for Nebulization 3 milliLiter(s) Nebulizer every 6 hours  AQUAPHOR (petrolatum Ointment) 1 Application(s) Topical two times a day  artificial  tears Solution 1 Drop(s) Both EYES two times a day  calcium carbonate   1250 mG (OsCal) 1 Tablet(s) Oral two times a day  chlorhexidine 2% Cloths 1 Application(s) Topical daily  chlorhexidine 2% Cloths 1 Application(s) Topical daily  enoxaparin Injectable 50 milliGRAM(s) SubCutaneous every 12 hours  erythromycin   Ointment 1 Application(s) Both EYES daily  gabapentin 300 milliGRAM(s) Oral every 12 hours  lactated ringers. 1000 milliLiter(s) IV Continuous <Continuous>  levETIRAcetam  IVPB 500 milliGRAM(s) IV Intermittent two times a day  levoFLOXacin IVPB      levoFLOXacin IVPB 750 milliGRAM(s) IV Intermittent every 24 hours  melatonin 3 milliGRAM(s) Oral at bedtime  meropenem  IVPB 1000 milliGRAM(s) IV Intermittent every 8 hours  methylPREDNISolone sodium succinate Injectable 40 milliGRAM(s) IV Push daily  midodrine. 20 milliGRAM(s) Oral three times a day  pantoprazole  Injectable 40 milliGRAM(s) IV Push every 24 hours  polyethylene glycol 3350 17 Gram(s) Oral at bedtime  raloxifene 60 milliGRAM(s) Oral daily  saccharomyces boulardii 250 milliGRAM(s) Oral two times a day  senna 2 Tablet(s) Oral at bedtime  sodium chloride 0.65% Nasal 2 Spray(s) Both Nostrils three times a day  sodium chloride 0.9% Bolus 500 milliLiter(s) IV Bolus once  vancomycin  IVPB 750 milliGRAM(s) IV Intermittent every 12 hours    PRN MEDICATIONS  acetaminophen     Tablet .. 650 milliGRAM(s) Oral every 8 hours PRN  aluminum hydroxide/magnesium hydroxide/simethicone Suspension 30 milliLiter(s) Oral every 4 hours PRN  ondansetron Injectable 4 milliGRAM(s) IV Push every 8 hours PRN    VITALS:   T(F): 97.1  HR: 70  BP: 106/53  RR: 18  SpO2: 98%    LABS:                        9.1    5.86  )-----------( 498      ( 18 Mar 2024 06:14 )             30.5     03-18    133<L>  |  95<L>  |  12  ----------------------------<  166<H>  3.5   |  29  |  0.6<L>    Ca    9.5      18 Mar 2024 06:14    TPro  6.9  /  Alb  3.2<L>  /  TBili  0.3  /  DBili  x   /  AST  11  /  ALT  10  /  AlkPhos  83  03-18      Urinalysis Basic - ( 18 Mar 2024 06:14 )    Color: x / Appearance: x / SG: x / pH: x  Gluc: 166 mg/dL / Ketone: x  / Bili: x / Urobili: x   Blood: x / Protein: x / Nitrite: x   Leuk Esterase: x / RBC: x / WBC x   Sq Epi: x / Non Sq Epi: x / Bacteria: x                RADIOLOGY:    PHYSICAL EXAM:  GEN: No acute distress  LUNGS: Clear to auscultation bilaterally   HEART: S1/S2 present. RRR.   ABD/ GI: Soft, non-tender, non-distended. Bowel sounds present  EXT: NC/NC/NE/2+PP/COHEN  NEURO: AAOX3

## 2024-03-19 NOTE — PROGRESS NOTE ADULT - ASSESSMENT
58F w/ PMHx Down Syndrome, nonverbal at baseline, Hypothyroidism, Cerebral palsy and Seizure Disorders presents to the ED from nursing facility/group home ([facility]) presented to ED for respiratory distress and high grade fever. Patient found to be septic on admission. Admitted to SDU for management of acute respiratory distress 2/2 CAP/Aspiration PNA (20 Jan 2024 18:22)  While pt was on the floor she developed dyspnea  consulted by pulmonary/critical care and was upgraded back to SDU.      A/P  # Sepsis POA / Acute hypoxic resp failure / RSV bronchiolitis /  H/o Dysphagia/ suspected aspiration    -  taper off   high flow oxygen   - aggressive pulmonary toilet, chest PT Q 4 hours, aspiration precautions, frequent suction   - ID is following, recommendations noted:   - Remove midline ( was put in on 2/26) -- staph epidermidis on 3/13 and repeat bl cx on 3/15 was negative  - Vanc dosing AUC/ZANE per clinical pharmacist x 14 days, or remove midline x 5 days from cleared BCX  - - - Continue Meropenem 1g q8h IV  - Continue Levaquin 750mg q24h IV , monitor QTC   - x 7 days end 3/20  - Doubt fungal in nature , fungal workup previously negative, f/u fungitell very mildly positive  - Failed FEES,  NG tube could not be placed after multiple attempts  - pulmonary is following  - c/w IV steroids, solumedrol 40 mg IV Q 24 hours -- currently on nasal cannula 2L.  - Grave prognosis, aggressive care is futile  -  obtain consent for PEG tube from advisory board.  GI has seen patient      # Seizure Disorder  - c/w  Keppra   - seizure precautions  - keep Mg above 2.0     # H/o lower ext DVT   - c/w therapeutic Lovenox, Eliquis on dc    #  Hypothyroidism  - TSH 0.51    # Normocytic Anemia, anemia of chronic disease   - monitor H/H, keep Hb above 7.5     # Down Syndrome/  Cerebral Palsy/ functional quadriplegia   - supportive care  - prevent falls and aspiration   - failed speech and swallow, might  need  PEG ( see above)  - on Raloxifene     Palliative care team is following ,  pt is  DNR/DNI ( approved by [facility]) , obtain consent  for PEG tube from Consumer Advisory Board [facility]

## 2024-03-19 NOTE — PROGRESS NOTE ADULT - PROBLEM SELECTOR PLAN 2
- c/w IV caspofungin given elevated fungitell  - c/w IV meropenem D#8, high risk, monitor WBC, hemodynamics  - c/w levophed, monitor hemodynamics
- c/w IV caspofungin given elevated fungitell  - c/w IV meropenem D#6, high risk, monitor WBC, hemodynamics.
- on continuos O2 therapy, chronic respiratory failure  - IV meropenem, high risk, monitor counts  - pressors as needed, now off
- c/w IV caspofungin given elevated fungitell  - c/w IV meropenem D#14, high risk, monitor WBC, hemodynamics  - c/w IV vanco
- on continuos O2 therapy, chronic respiratory failure - was improving earlier this week, but worsening today  - IV meropenem   - c/w IV vanco, monitor levels  - pressors as needed  - c/w HFNC
- on continuos O2 therapy, chronic respiratory failure  - IV meropenem, high risk, monitor counts  - pressors as needed, now off
- on continuos O2 therapy, chronic respiratory failure - was improving earlier this week, but worsening today  - IV meropenem   - c/w IV vanco, monitor levels  - pressors as needed, now off  - unclear of end-stage infectious process at this point - continue to monitor for now
- c/w IV caspofungin given elevated fungitell  - c/w IV meropenem D#9, high risk, monitor WBC, hemodynamics
- on continuos O2 therapy, chronic respiratory failure  - IV caspofungin, high risk, monitor counts  - pressors as needed, now off
- on continuos O2 therapy, chronic respiratory failure - was improving earlier this week, but worsening today  - IV meropenem, IV levaquin  - c/w IV vanco, monitor levels  - pressors as needed  - c/w NC
- c/w IV caspofungin given elevated fungitell  - c/w IV meropenem D#12, high risk, monitor WBC, hemodynamics  - c/w IV vanco
- on continuos O2 therapy, chronic respiratory failure - was improving earlier this week, but worsening today  - IV levaquin  - c/w IV vanco, monitor levels  - c/w NC
- on continuos O2 therapy, chronic respiratory failure - was improving earlier this week, but worsening today  - IV meropenem, high risk, monitor counts  - c/w IV vanco  - pressors as needed, now off

## 2024-03-19 NOTE — PROGRESS NOTE ADULT - PROBLEM SELECTOR PROBLEM 3
Dysphagia
Pneumonia
Dysphagia
Pneumonia
RSV infection
Dysphagia

## 2024-03-19 NOTE — PROGRESS NOTE ADULT - PROBLEM SELECTOR PLAN 4
- see [facility] note 2/29/24  - DNR/DNI  - currently, patient with recurrent infection on most recent bcx, and continued dependence on O2, after a period of improvement; if patient has an end-stage infectious process, may warrant consideration of CMO with Emelyn RIVAS and LS
- see [facility] note 2/29/24  - DNR/DNI
- awaiting to have discussion with [facility] ([phone])
- see [facility] note 2/29/24  - DNR/DNI
- see [facility] note 2/29/24  - DNR/DNI
- see above
- see above    approx. 46 mins spent
- patient currently improving  - would not consider CMO at this stage, respiratory status improving, appears to be clinically improving overall
- discussed with CAB ([phone]), meeting planned for 2/12/24  - will follow-up afterwards; at this stage patient has no end-stage condition to warrant MOLST checklist and OPWDD process for DNR/DNI/CMO but will revisit based on clinical course
- see [facility] note 2/29/24  - DNR/DNI  - currently, patient with recurrent infection on most recent bcx, and continued dependence on O2, after a period of improvement; if patient has an end-stage infectious process, may warrant consideration of CMO with Emelyn RIVAS and LS
- see [facility] note 2/29/24  - DNR/DNI
- discussed with CAB ([phone]) on 2/8/23, meeting planned for 2/12/24  - will follow-up afterwards; at this stage patient has no end-stage condition to warrant MOLST checklist and OPWDD process for DNR/DNI/CMO but will revisit based on clinical course
- discussed with CAB ([phone]) on 2/8/23, meeting planned for 2/12/24  - will follow-up afterwards; at this stage patient has no end-stage condition to warrant MOLST checklist and OPWDD process for DNR/DNI/CMO but will revisit based on clinical course

## 2024-03-19 NOTE — PROGRESS NOTE ADULT - PROBLEM SELECTOR PLAN 5
- will follow  ______________  Wu Jenkins MD  Palliative Medicine  [facility]   of Geriatric and Palliative Medicine  [phone].
- will follow  ______________  Wu Jenkins MD  Palliative Medicine  [facility]   of Geriatric and Palliative Medicine  [phone]
- will follow  ______________  Wu Jenkins MD  Palliative Medicine  [facility]   of Geriatric and Palliative Medicine  [phone]
- will follow  - d/w team  ______________  Wu Jenkins MD  Palliative Medicine  [facility]   of Geriatric and Palliative Medicine  [phone]
- will follow  ______________  Wu Jenkins MD  Palliative Medicine  [facility]   of Geriatric and Palliative Medicine  [phone]
- will follow  ______________  Wu Jenkins MD  Palliative Medicine  [facility]   of Geriatric and Palliative Medicine  [phone].
- will follow  ______________  Wu Jenkins MD  Palliative Medicine  [facility]   of Geriatric and Palliative Medicine  [phone].
- will sign off  - reconsult as needed  ______________  Wu Jenkins MD  Palliative Medicine  [facility]   of Geriatric and Palliative Medicine  [phone]
- will sign off for now  - reconsult PRN  ______________  Wu Jenkins MD  Palliative Medicine  [facility]   of Geriatric and Palliative Medicine  [phone].
- will follow  ______________  Wu Jenkins MD  Palliative Medicine  [facility]   of Geriatric and Palliative Medicine  [phone]
- will follow  - d/w team  ______________  Wu Jenkins MD  Palliative Medicine  [facility]   of Geriatric and Palliative Medicine  [phone]
- will follow  ______________  Wu Jenkins MD  Palliative Medicine  [facility]   of Geriatric and Palliative Medicine  [phone].
- will sign off for now  - reconsult PRN  ______________  Wu Jenkins MD  Palliative Medicine  [facility]   of Geriatric and Palliative Medicine  [phone]

## 2024-03-19 NOTE — PROGRESS NOTE ADULT - SUBJECTIVE AND OBJECTIVE BOX
24H events:    Patient is a 58y old Female who presents with a chief complaint of Respiratory Distress (18 Mar 2024 13:42)    Primary diagnosis of Sepsis with acute hypoxic respiratory failure        Today is 59d of hospitalization. This morning patient was seen and examined at bedside, resting comfortably in bed.    No acute or major events overnight.      PAST MEDICAL & SURGICAL HISTORY  Down syndrome    Osteoporosis    Mild anemia    Neuropathy    S/P debridement  of R hip on 3/2/21      SOCIAL HISTORY:  Social History:      ALLERGIES:  No Known Allergies    MEDICATIONS:  STANDING MEDICATIONS  acetylcysteine 20%  Inhalation 4 milliLiter(s) Inhalation every 6 hours  albuterol/ipratropium for Nebulization 3 milliLiter(s) Nebulizer every 6 hours  AQUAPHOR (petrolatum Ointment) 1 Application(s) Topical two times a day  artificial  tears Solution 1 Drop(s) Both EYES two times a day  calcium carbonate   1250 mG (OsCal) 1 Tablet(s) Oral two times a day  chlorhexidine 2% Cloths 1 Application(s) Topical daily  chlorhexidine 2% Cloths 1 Application(s) Topical daily  enoxaparin Injectable 50 milliGRAM(s) SubCutaneous every 12 hours  erythromycin   Ointment 1 Application(s) Both EYES daily  gabapentin 300 milliGRAM(s) Oral every 12 hours  lactated ringers. 1000 milliLiter(s) IV Continuous <Continuous>  levETIRAcetam  IVPB 500 milliGRAM(s) IV Intermittent two times a day  levoFLOXacin IVPB      levoFLOXacin IVPB 750 milliGRAM(s) IV Intermittent every 24 hours  melatonin 3 milliGRAM(s) Oral at bedtime  meropenem  IVPB 1000 milliGRAM(s) IV Intermittent every 8 hours  methylPREDNISolone sodium succinate Injectable 40 milliGRAM(s) IV Push daily  midodrine. 20 milliGRAM(s) Oral three times a day  pantoprazole  Injectable 40 milliGRAM(s) IV Push every 24 hours  polyethylene glycol 3350 17 Gram(s) Oral at bedtime  raloxifene 60 milliGRAM(s) Oral daily  saccharomyces boulardii 250 milliGRAM(s) Oral two times a day  senna 2 Tablet(s) Oral at bedtime  sodium chloride 0.65% Nasal 2 Spray(s) Both Nostrils three times a day  sodium chloride 0.9% Bolus 500 milliLiter(s) IV Bolus once  vancomycin  IVPB 750 milliGRAM(s) IV Intermittent every 12 hours    PRN MEDICATIONS  acetaminophen     Tablet .. 650 milliGRAM(s) Oral every 8 hours PRN  aluminum hydroxide/magnesium hydroxide/simethicone Suspension 30 milliLiter(s) Oral every 4 hours PRN  ondansetron Injectable 4 milliGRAM(s) IV Push every 8 hours PRN    VITALS:   T(F): 97.1  HR: 70  BP: 106/53  RR: 18  SpO2: 98%    PHYSICAL EXAM:    GENERAL: NAD, lying in bed comfortably  HEAD:  Atraumatic, normocephalic  EYES: EOMI, PERRLA, conjunctiva and sclera clear  NECK: Supple, trachea midline, no JVD  HEART: Regular rate and rhythm, no murmurs, rubs, or gallops  LUNGS: Unlabored respirations.  Clear to auscultation bilaterally, no crackles, wheezing, or rhonchi  ABDOMEN: Soft, nontender, nondistended, +BS  EXTREMITIES: 2+ peripheral pulses bilaterally. No clubbing, cyanosis, or edema  NERVOUS SYSTEM:  A&Ox0 , bedbound   SKIN: No rashes or lesions        LABS:                        9.1    5.86  )-----------( 498      ( 18 Mar 2024 06:14 )             30.5     03-18    133<L>  |  95<L>  |  12  ----------------------------<  166<H>  3.5   |  29  |  0.6<L>    Ca    9.5      18 Mar 2024 06:14    TPro  6.9  /  Alb  3.2<L>  /  TBili  0.3  /  DBili  x   /  AST  11  /  ALT  10  /  AlkPhos  83  03-18      Urinalysis Basic - ( 18 Mar 2024 06:14 )    Color: x / Appearance: x / SG: x / pH: x  Gluc: 166 mg/dL / Ketone: x  / Bili: x / Urobili: x   Blood: x / Protein: x / Nitrite: x   Leuk Esterase: x / RBC: x / WBC x   Sq Epi: x / Non Sq Epi: x / Bacteria: x

## 2024-03-19 NOTE — PROGRESS NOTE ADULT - PROBLEM SELECTOR PLAN 3
- NPO currently  - would need enteral feeding in near future based on plan of care, consent needed from CAB
- NGT feeds
- NPO currently
- NGT feeds
- NPO currently
- NPO currently  - would need enteral feeding in near future based on plan of care
- NPO currently  - would need enteral feeding in near future based on plan of care
- c/w NG tube  - may need PEG, CAB meeting planned for 2/12/24
- c/w NG tube  - may need PEG, CAB meeting planned for 2/12/24 - awaiting outcome of meeting
- c/w NG tube  - may need PEG.
- c/w NG tube  - may need PEG, CAB meeting planned for 2/12/24
- NGT feeds
- c/w NGT  - will need trach and PEG, as patient does not have a terminal condition, which would be needed for MOLST checklist completion given OPWDD status

## 2024-03-19 NOTE — PROGRESS NOTE ADULT - PROBLEM SELECTOR PROBLEM 2
Sepsis
Lung abscess
Sepsis
Sepsis
Lung abscess
Lung abscess
Sepsis

## 2024-03-19 NOTE — PROGRESS NOTE ADULT - PROBLEM SELECTOR PROBLEM 1
Down syndrome
Acute respiratory failure with hypoxia
Down syndrome
Acute respiratory failure with hypoxia
Acute respiratory failure with hypoxia
Down syndrome
Down syndrome

## 2024-03-19 NOTE — PROGRESS NOTE ADULT - PROBLEM SELECTOR PROBLEM 4
Advanced care planning/counseling discussion
RSV infection
Advanced care planning/counseling discussion
Sepsis
Advanced care planning/counseling discussion
RSV infection
Advanced care planning/counseling discussion
Advanced care planning/counseling discussion

## 2024-03-19 NOTE — PROGRESS NOTE ADULT - PROBLEM SELECTOR PROBLEM 5
Encounter for palliative care
Sepsis
Sepsis
Encounter for palliative care
Seizure disorder
Encounter for palliative care
Encounter for palliative care

## 2024-03-19 NOTE — PROGRESS NOTE ADULT - ASSESSMENT
# Sepsis POA / Acute hypoxic resp failure / RSV bronchiolitis /  H/o Dysphagia/ suspected aspiration    - continue midodrine 20 Q8H  - completed caspo (end 3/10) per ID   - Failed FEES,  NG tube for feedings and medications, pt keeps failing ng tube, patient might  need PEG tube once 02 requirements decrease ( in case if level of care is still the same, consumer advisory board agreed for CMO ( paperwork in the the paper chart)   - gi consulted for PEG   - c/w duoneb, chest PT, aspiration precautions   - pulmonary is following  - aggressive chest PT with vest, aspiration precautions and suction   - per ID, continue meropenem 1g q8h IV and add levaquin 750mg q24h, until. 3/20 then remove midline then       #  Elevated Troponin , type 2 MI/  Sinus tachycardia  - c/w telemetry monitoring   - Repeat EKG: Normal sinus rhythm  - c/w  metoprolol  - TTE 2/19 normal EF no DD or valve abnormalities    # Seizure Disorder  - c/w  Keppra   - seizure precautions  - keep Mg above 2.0     # H/o lower ext DVT   - c/w therapeutic Lovenox, Eliquis on dc    #  Hypothyroidism  - TSH 0.51    # Normocytic Anemia, anemia of chronic disease   - monitor H/H, keep Hb above 7.5     # Down Syndrome/  Cerebral Palsy/ functional quadriplegia   - supportive care  - prevent falls and aspiration   - failed speech and swallow, needs PEG as above  - gi consulted for peg, consent needed from advisory board number in chart   - on Raloxifene     Palliative follow up, patient is declining, if there is no improvement will change level of care to CMO ( change in level of care was approved by Consumer Advisory Board [place] [employer], paperwork in the paper chart)

## 2024-03-20 LAB
ALBUMIN SERPL ELPH-MCNC: 2.8 G/DL — LOW (ref 3.5–5.2)
ALP SERPL-CCNC: 73 U/L — SIGNIFICANT CHANGE UP (ref 30–115)
ALT FLD-CCNC: 9 U/L — SIGNIFICANT CHANGE UP (ref 0–41)
ANION GAP SERPL CALC-SCNC: 14 MMOL/L — SIGNIFICANT CHANGE UP (ref 7–14)
AST SERPL-CCNC: 19 U/L — SIGNIFICANT CHANGE UP (ref 0–41)
BASOPHILS # BLD AUTO: 0.01 K/UL — SIGNIFICANT CHANGE UP (ref 0–0.2)
BASOPHILS NFR BLD AUTO: 0.1 % — SIGNIFICANT CHANGE UP (ref 0–1)
BILIRUB SERPL-MCNC: 0.3 MG/DL — SIGNIFICANT CHANGE UP (ref 0.2–1.2)
BUN SERPL-MCNC: 9 MG/DL — LOW (ref 10–20)
CALCIUM SERPL-MCNC: 8.6 MG/DL — SIGNIFICANT CHANGE UP (ref 8.4–10.5)
CHLORIDE SERPL-SCNC: 96 MMOL/L — LOW (ref 98–110)
CO2 SERPL-SCNC: 23 MMOL/L — SIGNIFICANT CHANGE UP (ref 17–32)
CREAT SERPL-MCNC: 0.5 MG/DL — LOW (ref 0.7–1.5)
CULTURE RESULTS: SIGNIFICANT CHANGE UP
EGFR: 109 ML/MIN/1.73M2 — SIGNIFICANT CHANGE UP
EOSINOPHIL # BLD AUTO: 0.3 K/UL — SIGNIFICANT CHANGE UP (ref 0–0.7)
EOSINOPHIL NFR BLD AUTO: 4 % — SIGNIFICANT CHANGE UP (ref 0–8)
GLUCOSE BLDC GLUCOMTR-MCNC: 122 MG/DL — HIGH (ref 70–99)
GLUCOSE BLDC GLUCOMTR-MCNC: 157 MG/DL — HIGH (ref 70–99)
GLUCOSE SERPL-MCNC: 128 MG/DL — HIGH (ref 70–99)
HCT VFR BLD CALC: 27.7 % — LOW (ref 37–47)
HGB BLD-MCNC: 8.3 G/DL — LOW (ref 12–16)
IMM GRANULOCYTES NFR BLD AUTO: 0.8 % — HIGH (ref 0.1–0.3)
LYMPHOCYTES # BLD AUTO: 2.73 K/UL — SIGNIFICANT CHANGE UP (ref 1.2–3.4)
LYMPHOCYTES # BLD AUTO: 36.2 % — SIGNIFICANT CHANGE UP (ref 20.5–51.1)
MAGNESIUM SERPL-MCNC: 1.8 MG/DL — SIGNIFICANT CHANGE UP (ref 1.8–2.4)
MCHC RBC-ENTMCNC: 23.2 PG — LOW (ref 27–31)
MCHC RBC-ENTMCNC: 30 G/DL — LOW (ref 32–37)
MCV RBC AUTO: 77.6 FL — LOW (ref 81–99)
MONOCYTES # BLD AUTO: 0.66 K/UL — HIGH (ref 0.1–0.6)
MONOCYTES NFR BLD AUTO: 8.7 % — SIGNIFICANT CHANGE UP (ref 1.7–9.3)
NEUTROPHILS # BLD AUTO: 3.79 K/UL — SIGNIFICANT CHANGE UP (ref 1.4–6.5)
NEUTROPHILS NFR BLD AUTO: 50.2 % — SIGNIFICANT CHANGE UP (ref 42.2–75.2)
NRBC # BLD: 0 /100 WBCS — SIGNIFICANT CHANGE UP (ref 0–0)
PLATELET # BLD AUTO: 449 K/UL — HIGH (ref 130–400)
PMV BLD: 10.2 FL — SIGNIFICANT CHANGE UP (ref 7.4–10.4)
POTASSIUM SERPL-MCNC: 3.7 MMOL/L — SIGNIFICANT CHANGE UP (ref 3.5–5)
POTASSIUM SERPL-SCNC: 3.7 MMOL/L — SIGNIFICANT CHANGE UP (ref 3.5–5)
PROT SERPL-MCNC: 5.9 G/DL — LOW (ref 6–8)
RBC # BLD: 3.57 M/UL — LOW (ref 4.2–5.4)
RBC # FLD: 19.8 % — HIGH (ref 11.5–14.5)
SODIUM SERPL-SCNC: 133 MMOL/L — LOW (ref 135–146)
SPECIMEN SOURCE: SIGNIFICANT CHANGE UP
WBC # BLD: 7.55 K/UL — SIGNIFICANT CHANGE UP (ref 4.8–10.8)
WBC # FLD AUTO: 7.55 K/UL — SIGNIFICANT CHANGE UP (ref 4.8–10.8)

## 2024-03-20 PROCEDURE — 99233 SBSQ HOSP IP/OBS HIGH 50: CPT | Mod: GC

## 2024-03-20 PROCEDURE — 99232 SBSQ HOSP IP/OBS MODERATE 35: CPT

## 2024-03-20 RX ORDER — SODIUM CHLORIDE 9 MG/ML
1000 INJECTION, SOLUTION INTRAVENOUS
Refills: 0 | Status: DISCONTINUED | OUTPATIENT
Start: 2024-03-20 | End: 2024-03-22

## 2024-03-20 RX ORDER — SODIUM CHLORIDE 9 MG/ML
500 INJECTION, SOLUTION INTRAVENOUS ONCE
Refills: 0 | Status: COMPLETED | OUTPATIENT
Start: 2024-03-20 | End: 2024-03-20

## 2024-03-20 RX ADMIN — Medication 2 SPRAY(S): at 21:36

## 2024-03-20 RX ADMIN — Medication 250 MILLIGRAM(S): at 06:52

## 2024-03-20 RX ADMIN — GABAPENTIN 300 MILLIGRAM(S): 400 CAPSULE ORAL at 06:24

## 2024-03-20 RX ADMIN — Medication 1 APPLICATION(S): at 06:45

## 2024-03-20 RX ADMIN — Medication 4 MILLILITER(S): at 14:59

## 2024-03-20 RX ADMIN — SODIUM CHLORIDE 500 MILLILITER(S): 9 INJECTION, SOLUTION INTRAVENOUS at 11:37

## 2024-03-20 RX ADMIN — Medication 2 SPRAY(S): at 13:13

## 2024-03-20 RX ADMIN — RALOXIFENE HYDROCHLORIDE 60 MILLIGRAM(S): 60 TABLET, COATED ORAL at 11:17

## 2024-03-20 RX ADMIN — CHLORHEXIDINE GLUCONATE 1 APPLICATION(S): 213 SOLUTION TOPICAL at 11:27

## 2024-03-20 RX ADMIN — Medication 3 MILLILITER(S): at 21:26

## 2024-03-20 RX ADMIN — Medication 1 DROP(S): at 06:45

## 2024-03-20 RX ADMIN — Medication 250 MILLIGRAM(S): at 17:23

## 2024-03-20 RX ADMIN — MIDODRINE HYDROCHLORIDE 20 MILLIGRAM(S): 2.5 TABLET ORAL at 06:24

## 2024-03-20 RX ADMIN — ENOXAPARIN SODIUM 50 MILLIGRAM(S): 100 INJECTION SUBCUTANEOUS at 06:23

## 2024-03-20 RX ADMIN — Medication 1 APPLICATION(S): at 11:16

## 2024-03-20 RX ADMIN — SODIUM CHLORIDE 60 MILLILITER(S): 9 INJECTION, SOLUTION INTRAVENOUS at 21:37

## 2024-03-20 RX ADMIN — MEROPENEM 100 MILLIGRAM(S): 1 INJECTION INTRAVENOUS at 21:36

## 2024-03-20 RX ADMIN — MEROPENEM 100 MILLIGRAM(S): 1 INJECTION INTRAVENOUS at 11:16

## 2024-03-20 RX ADMIN — Medication 40 MILLIGRAM(S): at 06:27

## 2024-03-20 RX ADMIN — Medication 1 TABLET(S): at 17:24

## 2024-03-20 RX ADMIN — GABAPENTIN 300 MILLIGRAM(S): 400 CAPSULE ORAL at 17:24

## 2024-03-20 RX ADMIN — MIDODRINE HYDROCHLORIDE 20 MILLIGRAM(S): 2.5 TABLET ORAL at 17:24

## 2024-03-20 RX ADMIN — Medication 3 MILLILITER(S): at 08:20

## 2024-03-20 RX ADMIN — MEROPENEM 100 MILLIGRAM(S): 1 INJECTION INTRAVENOUS at 06:30

## 2024-03-20 RX ADMIN — Medication 2 SPRAY(S): at 06:50

## 2024-03-20 RX ADMIN — Medication 4 MILLILITER(S): at 21:27

## 2024-03-20 RX ADMIN — Medication 250 MILLIGRAM(S): at 17:24

## 2024-03-20 RX ADMIN — Medication 3 MILLILITER(S): at 14:59

## 2024-03-20 RX ADMIN — ENOXAPARIN SODIUM 50 MILLIGRAM(S): 100 INJECTION SUBCUTANEOUS at 17:24

## 2024-03-20 RX ADMIN — Medication 1 APPLICATION(S): at 17:30

## 2024-03-20 RX ADMIN — LEVETIRACETAM 400 MILLIGRAM(S): 250 TABLET, FILM COATED ORAL at 06:31

## 2024-03-20 RX ADMIN — Medication 250 MILLIGRAM(S): at 06:47

## 2024-03-20 RX ADMIN — MIDODRINE HYDROCHLORIDE 20 MILLIGRAM(S): 2.5 TABLET ORAL at 11:16

## 2024-03-20 RX ADMIN — Medication 4 MILLILITER(S): at 08:20

## 2024-03-20 RX ADMIN — Medication 1 TABLET(S): at 06:24

## 2024-03-20 RX ADMIN — Medication 1 DROP(S): at 17:25

## 2024-03-20 RX ADMIN — LEVETIRACETAM 400 MILLIGRAM(S): 250 TABLET, FILM COATED ORAL at 17:24

## 2024-03-20 NOTE — PROGRESS NOTE ADULT - ASSESSMENT
58F w/ PMHx Down Syndrome, nonverbal at baseline, Hypothyroidism, Cerebral palsy and Seizure Disorders presents to the ED from nursing facility/group home ([facility]) presented to ED for respiratory distress and high grade fever. Patient found to be septic on admission.Admitted to SDU for management of acute respiratory distress 2/2 CAP/Aspiration PNA (20 Jan 2024 18:22)  While pt was on the floor she developed dyspnea after swallow evaluation, was spiking high grade fever, upgraded to SDU. Patient was being managed for acute hypoxic respiratory failure required requiring oxygen supplement Patient failed speech and swallow evaluation.  SLP recommended n.p.o. with plan oral means of nutrition.  GI consulted to consider  PEG placement.    # Oropharyngeal dysphagia, PEG evaluation   speech and swallow recommendation.  N.p.o. with nonoral means of nutrition.  Sepsis, and respiratory failure NC O2  - recalled for PEG 3/20: patient on 4L NC O2  - no fever, no leucocytosis  - on lovenox therauptic    Plan  NG tube feeding for now  CAB paperwork done, consent in the chart   please consult anesthesia for consent from CAB  Patient needs to be off lovenox 24 hours before procedure  Will plan EGD with PEG once patient is optimized and consent obtained

## 2024-03-20 NOTE — PROGRESS NOTE ADULT - ASSESSMENT
# Sepsis POA / Acute hypoxic resp failure / RSV bronchiolitis /  H/o Dysphagia/ suspected aspiration    - continue midodrine 20 Q8H  - completed caspo (end 3/10) per ID   - Failed FEES,  NG tube for feedings and medications, pt keeps failing ng tube, patient might  need PEG tube once 02 requirements decrease ( in case if level of care is still the same, consumer advisory board agreed for CMO ( paperwork in the the paper chart)   - gi consulted for PEG , endorsed to social work to obtain consult   - c/w duoneb, chest PT, aspiration precautions   - on 4L NC, wean as tolerated   - pulmonary is following  - per ID, continue meropenem 1g q8h IV and add levaquin 750mg q24h, until. 3/20 then remove midline then       #  Elevated Troponin , type 2 MI/  Sinus tachycardia  - c/w telemetry monitoring   - Repeat EKG: Normal sinus rhythm  - c/w  metoprolol  - TTE 2/19 normal EF no DD or valve abnormalities    # Seizure Disorder  - c/w  Keppra   - seizure precautions  - keep Mg above 2.0     # H/o lower ext DVT   - c/w therapeutic Lovenox, Eliquis on dc    #  Hypothyroidism  - TSH 0.51    # Normocytic Anemia, anemia of chronic disease   - monitor H/H, keep Hb above 7.5     # Down Syndrome/  Cerebral Palsy/ functional quadriplegia   - supportive care  - prevent falls and aspiration   - failed speech and swallow, needs PEG as above  - gi consulted for peg, consent needed from advisory board number in chart   - on Raloxifene     Palliative follow up, patient is declining, if there is no improvement will change level of care to CMO ( change in level of care was approved by Consumer Advisory Board [place] [employer], paperwork in the paper chart)     Pending: PEG, wean metylprednisolone, hold lovenox 24 hours before peg

## 2024-03-20 NOTE — PROGRESS NOTE ADULT - SUBJECTIVE AND OBJECTIVE BOX
24H events:    Patient is a 58y old Female who presents with a chief complaint of Respiratory Distress (19 Mar 2024 16:13)    Primary diagnosis of Sepsis with acute hypoxic respiratory failure        Today is 60d of hospitalization. This morning patient was seen and examined at bedside, resting comfortably in bed.    No acute or major events overnight.      PAST MEDICAL & SURGICAL HISTORY  Down syndrome    Osteoporosis    Mild anemia    Neuropathy    S/P debridement  of R hip on 3/2/21      SOCIAL HISTORY:  Social History:      ALLERGIES:  No Known Allergies    MEDICATIONS:  STANDING MEDICATIONS  acetylcysteine 20%  Inhalation 4 milliLiter(s) Inhalation every 6 hours  albuterol/ipratropium for Nebulization 3 milliLiter(s) Nebulizer every 6 hours  AQUAPHOR (petrolatum Ointment) 1 Application(s) Topical two times a day  artificial  tears Solution 1 Drop(s) Both EYES two times a day  calcium carbonate   1250 mG (OsCal) 1 Tablet(s) Oral two times a day  chlorhexidine 2% Cloths 1 Application(s) Topical daily  chlorhexidine 2% Cloths 1 Application(s) Topical daily  enoxaparin Injectable 50 milliGRAM(s) SubCutaneous every 12 hours  erythromycin   Ointment 1 Application(s) Both EYES daily  gabapentin 300 milliGRAM(s) Oral every 12 hours  levETIRAcetam  IVPB 500 milliGRAM(s) IV Intermittent two times a day  levoFLOXacin IVPB      levoFLOXacin IVPB 750 milliGRAM(s) IV Intermittent every 24 hours  melatonin 3 milliGRAM(s) Oral at bedtime  meropenem  IVPB 1000 milliGRAM(s) IV Intermittent every 8 hours  methylPREDNISolone sodium succinate Injectable 40 milliGRAM(s) IV Push daily  midodrine. 20 milliGRAM(s) Oral three times a day  pantoprazole  Injectable 40 milliGRAM(s) IV Push every 24 hours  polyethylene glycol 3350 17 Gram(s) Oral at bedtime  raloxifene 60 milliGRAM(s) Oral daily  saccharomyces boulardii 250 milliGRAM(s) Oral two times a day  senna 2 Tablet(s) Oral at bedtime  sodium chloride 0.65% Nasal 2 Spray(s) Both Nostrils three times a day  sodium chloride 0.9% Bolus 500 milliLiter(s) IV Bolus once  vancomycin  IVPB 750 milliGRAM(s) IV Intermittent every 12 hours    PRN MEDICATIONS  acetaminophen     Tablet .. 650 milliGRAM(s) Oral every 8 hours PRN  aluminum hydroxide/magnesium hydroxide/simethicone Suspension 30 milliLiter(s) Oral every 4 hours PRN  ondansetron Injectable 4 milliGRAM(s) IV Push every 8 hours PRN    VITALS:   T(F): 97.4  HR: 68  BP: 112/58  RR: 18  SpO2: 97%    PHYSICAL EXAM:    GENERAL: NAD, lying in bed comfortably, ng tube , on 4lnc   HEAD:  Atraumatic, normocephalic  EYES: EOMI, PERRLA, conjunctiva and sclera clear  NECK: Supple, trachea midline, no JVD  HEART: Regular rate and rhythm, no murmurs, rubs, or gallops  LUNGS: Unlabored respirations.  Clear to auscultation bilaterally, no crackles, wheezing, or rhonchi  ABDOMEN: Soft, nontender, nondistended, +BS  EXTREMITIES: 2+ peripheral pulses bilaterally. No clubbing, cyanosis, or edema  NERVOUS SYSTEM:  A&Ox0 , moves spontaneously   SKIN: No rashes or lesions        LABS:                        8.3    7.55  )-----------( 449      ( 20 Mar 2024 07:34 )             27.7

## 2024-03-20 NOTE — PROGRESS NOTE ADULT - ATTENDING COMMENTS
Oropharyngeal dysphagia w/ FTT in pt w/ CP. PEG pending consent. NG feeds in the meantime. rest of recs per the note above.

## 2024-03-20 NOTE — PROGRESS NOTE ADULT - SUBJECTIVE AND OBJECTIVE BOX
JERICHO TEA  58y, Female  Allergy: No Known Allergies      LOS  60d    CHIEF COMPLAINT: Respiratory Distress (20 Mar 2024 09:11)      INTERVAL EVENTS/HPI  - T(F): , Max: 97.4 (03-20-24 @ 07:59)  - WBC Count: 7.55 (03-20-24 @ 07:34)  WBC Count: 5.86 (03-18-24 @ 06:14)     - Creatinine: 0.5 (03-20-24 @ 07:34)     -   -   -     ROS  cannot obtain secondary to patient's sedation and/or mental status    VITALS:  T(F): 97.4, Max: 97.4 (03-20-24 @ 07:59)  HR: 66  BP: 126/66  RR: 18Vital Signs Last 24 Hrs  T(C): 36.3 (20 Mar 2024 07:59), Max: 36.3 (20 Mar 2024 07:59)  T(F): 97.4 (20 Mar 2024 07:59), Max: 97.4 (20 Mar 2024 07:59)  HR: 66 (20 Mar 2024 12:25) (62 - 100)  BP: 126/66 (20 Mar 2024 12:25) (79/50 - 126/66)  BP(mean): --  RR: 18 (20 Mar 2024 12:25) (18 - 18)  SpO2: 95% (20 Mar 2024 12:25) (95% - 97%)    Parameters below as of 20 Mar 2024 12:25  Patient On (Oxygen Delivery Method): nasal cannula  O2 Flow (L/min): 1      PHYSICAL EXAM:  Gen: chronically ill appearing   HEENT: Normocephalic, atraumatic  Neck: supple, no lymphadenopathy  CV: Regular rate & regular rhythm  Lungs: decreased BS at bases, no fremitus  Abdomen: Soft, BS present  Ext: Warm, well perfused  Neuro: non focal, not following commands  Skin: no rash, no erythema  Lines: no phlebitis     FH: Non-contributory  Social Hx: Non-contributory    TESTS & MEASUREMENTS:                        8.3    7.55  )-----------( 449      ( 20 Mar 2024 07:34 )             27.7     03-20    133<L>  |  96<L>  |  9<L>  ----------------------------<  128<H>  3.7   |  23  |  0.5<L>    Ca    8.6      20 Mar 2024 07:34  Mg     1.8     03-20    TPro  5.9<L>  /  Alb  2.8<L>  /  TBili  0.3  /  DBili  x   /  AST  19  /  ALT  9   /  AlkPhos  73  03-20      LIVER FUNCTIONS - ( 20 Mar 2024 07:34 )  Alb: 2.8 g/dL / Pro: 5.9 g/dL / ALK PHOS: 73 U/L / ALT: 9 U/L / AST: 19 U/L / GGT: x           Urinalysis Basic - ( 20 Mar 2024 07:34 )    Color: x / Appearance: x / SG: x / pH: x  Gluc: 128 mg/dL / Ketone: x  / Bili: x / Urobili: x   Blood: x / Protein: x / Nitrite: x   Leuk Esterase: x / RBC: x / WBC x   Sq Epi: x / Non Sq Epi: x / Bacteria: x        Culture - Blood (collected 03-15-24 @ 11:43)  Source: .Blood None  Preliminary Report (03-19-24 @ 23:01):    No growth at 4 days    Culture - Blood (collected 03-13-24 @ 10:00)  Source: .Blood Blood  Gram Stain (03-15-24 @ 02:17):    Growth in anaerobic bottle: Gram Positive Cocci in Clusters  Final Report (03-15-24 @ 19:16):    Growth in anaerobic bottle: Staphylococcus epidermidis    Isolation of Coagulase negative Staphylococcus from single blood culture    sets may represent    contamination. Contact the Microbiology Department at [phone] if    susceptibility testing is    clinically indicated.    Direct identification is available within approximately 3-5    hours either by Blood Panel Multiplexed PCR or Direct    MALDI-TOF. Details: [email or web address]  Organism: Blood Culture PCR (03-15-24 @ 19:16)  Organism: Blood Culture PCR (03-15-24 @ 19:16)      -  Staphylococcus epidermidis, Methicillin resistant: Detec      Method Type: PCR    Culture - Blood (collected 03-08-24 @ 08:30)  Source: .Blood None  Final Report (03-13-24 @ 14:01):    No growth at 5 days    Culture - Blood (collected 03-07-24 @ 22:12)  Source: .Blood Blood-Peripheral  Final Report (03-13-24 @ 07:00):    No growth at 5 days    Culture - Blood (collected 02-25-24 @ 11:47)  Source: .Blood None  Final Report (03-01-24 @ 20:00):    No growth at 5 days            INFECTIOUS DISEASES TESTING  MRSA PCR Result.: Negative (03-16-24 @ 23:45)  Fungitell: 81 (03-13-24 @ 17:24)  Procalcitonin, Serum: 0.19 (03-13-24 @ 07:08)  Fungitell: 73 (02-23-24 @ 18:19)  MRSA PCR Result.: Negative (02-20-24 @ 13:42)  Fungitell: 76 (02-19-24 @ 16:00)  Procalcitonin, Serum: 0.16 (02-19-24 @ 16:00)  Procalcitonin, Serum: 0.20 (02-19-24 @ 11:23)  Rapid RVP Result: NotDetec (02-17-24 @ 19:06)  Procalcitonin, Serum: 0.11 (02-17-24 @ 10:41)  MRSA PCR Result.: Negative (02-15-24 @ 06:20)  Procalcitonin, Serum: 0.10 (02-12-24 @ 11:17)  Rapid RVP Result: NotDetec (02-12-24 @ 10:28)  MRSA PCR Result.: Negative (02-12-24 @ 10:28)  Fungitell: 63 (02-06-24 @ 11:27)  Fungitell: 65 (02-06-24 @ 04:43)  Rapid RVP Result: NotDetec (02-04-24 @ 10:28)  MRSA PCR Result.: Negative (02-03-24 @ 21:32)  Procalcitonin, Serum: 0.15 (02-03-24 @ 11:13)  Procalcitonin, Serum: 0.22 (02-01-24 @ 16:00)  MRSA PCR Result.: Negative (01-22-24 @ 05:30)  Legionella Antigen, Urine: Negative (01-21-24 @ 05:00)  Rapid RVP Result: Detected (01-21-24 @ 00:58)  Procalcitonin, Serum: 0.51 (01-20-24 @ 21:23)  Fungitell: 325 (01-20-24 @ 21:23)  Fungitell: 138 (11-07-23 @ 08:08)  Fungitell: 115 (11-02-23 @ 11:40)  Procalcitonin, Serum: 0.04 (11-02-23 @ 11:40)  Procalcitonin, Serum: 0.26 (10-24-23 @ 11:00)  MRSA PCR Result.: Negative (10-17-23 @ 17:20)  Fungitell: >500 (10-17-23 @ 17:20)  Procalcitonin, Serum: 4.36 (10-17-23 @ 17:20)  Procalcitonin, Serum: 4.18 (10-17-23 @ 12:35)  Procalcitonin, Serum: 0.07 (10-15-23 @ 07:28)  COVID-19 PCR: NotDetec (10-12-23 @ 09:14)  Procalcitonin, Serum: 0.40 (10-07-23 @ 10:54)  Legionella Antigen, Urine: Negative (09-30-23 @ 14:36)  MRSA PCR Result.: Negative (09-29-23 @ 16:50)  Procalcitonin, Serum: 9.78 (08-12-23 @ 11:25)  MRSA PCR Result.: Negative (08-12-23 @ 06:10)  Rapid RVP Result: NotDetec (08-12-23 @ 01:33)  Procalcitonin, Serum: 0.63 (08-10-23 @ 08:46)  Procalcitonin, Serum: 7.82 (08-04-23 @ 16:13)  MRSA PCR Result.: Negative (08-04-23 @ 14:52)  Rapid RVP Result: NotDetec (08-04-23 @ 03:00)      INFLAMMATORY MARKERS  Sedimentation Rate, Erythrocyte: 100 mm/Hr (02-03-24 @ 11:13)  C-Reactive Protein, Serum: 214.2 mg/L (02-03-24 @ 11:13)  C-Reactive Protein, Serum: 132.3 mg/L (02-01-24 @ 16:00)  Sedimentation Rate, Erythrocyte: 67 mm/Hr (10-15-23 @ 07:28)      RADIOLOGY & ADDITIONAL TESTS:  I have personally reviewed the last available Chest xray  CXR      CT      CARDIOLOGY TESTING  12 Lead ECG:   Ventricular Rate 135 BPM    Atrial Rate 135 BPM    P-R Interval 116 ms    QRS Duration 66 ms    Q-T Interval 294 ms    QTC Calculation(Bazett) 441 ms    P Axis 34 degrees    R Axis 49 degrees    T Axis 30 degrees    Diagnosis Line Sinus tachycardia  Possible Left atrial enlargement  Borderline ECG    Confirmed by MARJAN MENDES MD ([id number]) on 3/14/2024 6:37:25 AM (03-13-24 @ 23:14)  12 Lead ECG:   Ventricular Rate 107 BPM    Atrial Rate 107 BPM    P-R Interval 120 ms    QRS Duration 66 ms    Q-T Interval 322 ms    QTC Calculation(Bazett) 429 ms    P Axis 50 degrees    R Axis 90 degrees    T Axis 48 degrees    Diagnosis Line Sinus tachycardia  Rightward axis  Borderline ECG    Confirmed by caleb roberts ([id number]) on 3/13/2024 12:38:34 PM (03-13-24 @ 07:52)      MEDICATIONS  acetylcysteine 20%  Inhalation 4  albuterol/ipratropium for Nebulization 3  AQUAPHOR (petrolatum Ointment) 1  artificial  tears Solution 1  calcium carbonate   1250 mG (OsCal) 1  chlorhexidine 2% Cloths 1  chlorhexidine 2% Cloths 1  enoxaparin Injectable 50  erythromycin   Ointment 1  gabapentin 300  lactated ringers. 1000  levETIRAcetam  IVPB 500  levoFLOXacin IVPB   levoFLOXacin IVPB 750  melatonin 3  meropenem  IVPB 1000  methylPREDNISolone sodium succinate Injectable 40  midodrine. 20  pantoprazole  Injectable 40  polyethylene glycol 3350 17  raloxifene 60  saccharomyces boulardii 250  senna 2  sodium chloride 0.65% Nasal 2  sodium chloride 0.9% Bolus 500  vancomycin  IVPB 750      WEIGHT  Weight (kg): 52.3 (01-21-24 @ 08:00)  Creatinine: 0.5 mg/dL (03-20-24 @ 07:34)      ANTIBIOTICS:  levoFLOXacin IVPB      levoFLOXacin IVPB 750 milliGRAM(s) IV Intermittent every 24 hours  meropenem  IVPB 1000 milliGRAM(s) IV Intermittent every 8 hours  vancomycin  IVPB 750 milliGRAM(s) IV Intermittent every 12 hours      All available historical records have been reviewed

## 2024-03-20 NOTE — PROGRESS NOTE ADULT - ATTENDING COMMENTS
Minimally responsive; aide present at bedside; states opens eyes spontaneously ; but does not follow commands  BP noted to be low; given fluids with response  Patient tolerating tube feeds    58F w/ PMHx Down Syndrome, nonverbal at baseline, Hypothyroidism, Cerebral palsy and Seizure Disorders presents to the ED from nursing facility/group home ([facility]) presented to ED for respiratory distress and high grade fever. Patient found to be septic on admission. Admitted to SDU for management of acute respiratory distress 2/2 CAP/Aspiration PNA (20 Jan 2024 18:22)  While pt was on the floor she developed dyspnea  consulted by pulmonary/critical care and was upgraded back to SDU.      A/P    # Sepsis POA   - Acute hypoxic resp failure  multifactorial RSV bronchiolitis on admission; then concern for   possible gram negative pneumonia and suspected aspiration  - Staph epidermidis bacteremia in 3/14  - staph hominis on on presentation - possible contaminant  repeat cultures 3/15- NGTD  ID following- recommending danna and levaquin for 7 days till 3/20; and vanc 5 days from time of negative cultures- to remove midline after todays antibiotics  - Remove midline ( was put in on 2/26) -- staph epidermidis on 3/13 and repeat bl cx on 3/15 was negative  - Vanc dosing AUC/ZANE per clinical pharmacist x 14 days, or remove midline x 5 days from cleared BCX  fungitell -81  - c/w IV steroids, solumedrol 40 mg IV Q 24 hours - plan to taper  - currently on nasal cannula 4L; -  wean down on oxygen as tolerated    # dysphagia- plan for PEG  d/w GI  hold lovenox prior to PEG  pending consent from advisory board    # Seizure Disorder  - c/w  Keppra   - seizure precautions  - keep Mg above 2.0     # H/o lower ext DVT   - c/w therapeutic Lovenox, Eliquis on dc    #  Hypothyroidism  - TSH 0.51    # microcytic Anemia, anemia of chronic disease   - monitor H/H,  ferritin normal; transferring saturation <6%; possible iron deficiency    #  Elevated Troponin , type 2 MI/  Sinus tachycardia- c/w  metoprolol  - TTE 2/19 normal EF no DD or valve abnormalities    # Down Syndrome/  Cerebral Palsy/ functional quadriplegia   - supportive care  - prevent falls and aspiration   - failed speech and swallow, might  need  PEG ( see above)  - on Raloxifene     # hypotension- on midodrine; continue    Palliative care team is following ,  pt is  DNR/DNI ( approved by [facility]) , obtain consent  for PEG tube from [employer] [place] [employer]    Pending: PEG placement, weaning off oxygen and steroids; monitoring BP    Time spent 45 minutes on chart review and coordination of care Minimally responsive; aide present at bedside; states opens eyes spontaneously ; but does not follow commands  BP noted to be low; given fluids with response  Patient tolerating tube feeds    58F w/ PMHx Down Syndrome, nonverbal at baseline, Hypothyroidism, Cerebral palsy and Seizure Disorders presents to the ED from nursing facility/group home ([facility]) presented to ED for respiratory distress and high grade fever. Patient found to be septic on admission. Admitted to SDU for management of acute respiratory distress 2/2 CAP/Aspiration PNA (20 Jan 2024 18:22)  While pt was on the floor she developed dyspnea  consulted by pulmonary/critical care and was upgraded back to SDU.      A/P    # Sepsis POA   - Acute hypoxic resp failure  multifactorial RSV bronchiolitis on admission; then concern for   possible gram negative pneumonia and suspected aspiration  - Staph epidermidis bacteremia in 3/14  - staph hominis on on presentation - possible contaminant  repeat cultures 3/15- NGTD  ID following- recommending danna and levaquin for 7 days till 3/20; and vanc 5 days from time of negative cultures- to remove midline after todays antibiotics  - Remove midline ( was put in on 2/26) -- staph epidermidis on 3/13 and repeat bl cx on 3/15 was negative  - Vanc dosing AUC/ZANE per clinical pharmacist x 14 days, or remove midline x 5 days from cleared BCX  fungitell -81  - c/w IV steroids, solumedrol 40 mg IV Q 24 hours - plan to taper  - currently on nasal cannula 4L; -  wean down on oxygen as tolerated    # dysphagia- plan for PEG  d/w GI  hold lovenox prior to PEG  pending consent from advisory board    # Seizure Disorder  - c/w  Keppra   - seizure precautions  - keep Mg above 2.0     # H/o lower ext DVT   - c/w therapeutic Lovenox, Eliquis on dc    #  Hypothyroidism  - TSH 0.51    # microcytic Anemia, anemia of chronic disease   - monitor H/H,  ferritin normal; transferring saturation <6%; possible iron deficiency    #  Elevated Troponin , type 2 MI/  Sinus tachycardia- c/w  metoprolol  - TTE 2/19 normal EF no DD or valve abnormalities    # Down Syndrome/  Cerebral Palsy/ functional quadriplegia   - supportive care  - prevent falls and aspiration   - failed speech and swallow, might  need  PEG ( see above)  - on Raloxifene     # multiple pressure ulcers- continue wound care per recommendation from wound care; d/w nursing staff  # hypotension- on midodrine; continue    Palliative care team is following ,  pt is  DNR/DNI ( approved by [facility]) , obtain consent  for PEG tube from [employer] [place] [employer]    Pending: PEG placement, weaning off oxygen and steroids; monitoring BP    Time spent 45 minutes on chart review and coordination of care

## 2024-03-20 NOTE — PROGRESS NOTE ADULT - ASSESSMENT
ASSESSMENT  58F w/ PMHx Down Syndrome, nonverbal at baseline, Hypothyroidism, Cerebral palsy and Seizure Disorders presents to the ED from nursing facility/group home presented to ED for respiratory distress and high grade fever.     IMPRESSION  #Bacteremia, Staphylococcus epidermidis, Methicillin resistant: Detec    3/15 BCX NG    3/13 BCX Staphylococcus epidermidis , has midline  #Hypoxemia    3/13 CT Chest Extensive patchy  left-sided pulmonary opacities, slightly decreased from   prior CT. Patchy right-sided opacities also demonstrated. Findings are   likely infectious in etiology. Recommend follow-up.    CXR 3/13 Bilateral interstitial densities may reflect vascular congestion.  #HAP / aspiration with cavitary PNA  < from: CT Chest w/ IV Cont (02.21.24 @ 00:09) >  Bilateral pneumonia, left worse than right, with a left upper lobe   thick-walled cavity. Diffuse opacification of the   left lung is seen with what appears to be some mucus plugging centrally   within the left mainstem bronchus. There is a pleural effusion.   Reactive mediastinal   lymph nodes are seen.    MRSA PCR Result.: Negative (02-20-24 @ 13:42)    Procalcitonin, Serum: 0.16 (02-19-24 @ 16:00)- unremarkable     2/17 BCX NGTD     Rapid RVP Result: NotDetec (02-17-24 @ 19:06)    2/12 BCX NGTD    Procalcitonin, Serum: 0.10 (02-12-24 @ 11:17)    Rapid RVP Result: NotDetec (02-12-24 @ 10:28)    MRSA PCR Result.: Negative (02-12-24 @ 10:28)    2/9 BCX NGTD x2    2/3 BCX NGTD     2/1 BCX NGTD     2/1 UCX   >=3 organisms. Probable collection contamination.    1/31 BCX NG    Rapid RVP Result: NotDetec (02-04-24 @ 10:28)    MRSA PCR Result.: Negative (02-03-24 @ 21:32)     UA without significant pyuria   Procalcitonin, Serum: 0.22 (02-01-24 @ 16:00)--> Procalcitonin, Serum: 0.15 (02-03-24 @ 11:13), downtrending ; unremarkable   MRSA PCR Result.: Negative (01-22-24 @ 05:30)  < from: Xray Chest 1 View-PORTABLE IMMEDIATE (Xray Chest 1 View-PORTABLE IMMEDIATE .) (02.01.24 @ 03:02) >  Bibasal opacities without significant change.    quantiferon gold negative  #Acute hypoxic respiratory failure- Gram Negative pneumonia   #1/20 BCX 1/4 bottles : Staphylococcus hominis- contaminant   Repeat CX NG   #Severe Sepsis on admission  #RSV + 1/21  #Elevated fungitell   serum aspergillus galactomannan and histo are (-), not at risk of PCP\  Fungitell: 81 (03-13-24 @ 17:24)  Fungitell: 76 (02-19-24 @ 16:00)  Fungitell: 63 (02-06-24 @ 11:27)  Fungitell: 325 (01-20-24 @ 21:23)  Fungitell: 138 (11-07-23 @ 08:08)  Fungitell: 115 (11-02-23 @ 11:40)  Fungitell: >500 pg/mL (10.17.23 @ 17:20) s/p empiric caspo   #Full thickness ulcer right heel- Appreciate burn/podiatry evaluation.  #History of Right planter foot ulcers - two full thickness ulcers - serous drainage with mild erythema with OM  - MR Foot No Cont, Right (10.16.23 @ 21:51): IMPRESSION: 1.  Limited exam. 2.  Osteomyelitis of the first metatarsal stump. 3.  Osteomyelitis of the second toe distal phalanx.  - s/p excidional debridement to and including bone 1st metatarsal with partial 2nd digit amputation - 1st metatarsal head resected - Wound Cx Proteus ESBL   #CKD 2-3 Creatinine: 0.7 mg/dL (02.02.24 @ 04:59)  #History of buttock ulcer  #Down syndrome/Cerebral Palsy     RECOMMENDATIONS  - midline 2/26, recommend removing as CONS and would shorten antibiotic duration  - Vanc dosing AUC/ZANE per clinical pharmacist x 14 days 3/28, or remove midline x 5 days from cleared BCX  - fungitell borderline high 81, normal up to 80. repeat in 7 days. many reasons for false +, patient not at risk of invasive fungal infections  - Sputum cx if possible  - Continue Meropenem 1g q8h IV  - Continue Levaquin 750mg q24h IV , monitor QTC   - x 7 days end 3/20  - Grave prognosis, aggressive care is futile    If any questions, please send a message or call on [employer] Teams  Please continue to update ID with any pertinent new laboratory or radiographic findings.

## 2024-03-20 NOTE — PROGRESS NOTE ADULT - SUBJECTIVE AND OBJECTIVE BOX
Gastroenterology progress note:     Patient is a 58y old  Female who presents with a chief complaint of Respiratory Distress (20 Mar 2024 15:05)       Admitted on: 01-20-24    We are following the patient for: dysphagia       Interval History: recalled for PEG eval    PAST MEDICAL & SURGICAL HISTORY:  Down syndrome      Osteoporosis      Mild anemia      Neuropathy      S/P debridement  of R hip on 3/2/21          MEDICATIONS  (STANDING):  acetylcysteine 20%  Inhalation 4 milliLiter(s) Inhalation every 6 hours  albuterol/ipratropium for Nebulization 3 milliLiter(s) Nebulizer every 6 hours  AQUAPHOR (petrolatum Ointment) 1 Application(s) Topical two times a day  artificial  tears Solution 1 Drop(s) Both EYES two times a day  calcium carbonate   1250 mG (OsCal) 1 Tablet(s) Oral two times a day  chlorhexidine 2% Cloths 1 Application(s) Topical daily  chlorhexidine 2% Cloths 1 Application(s) Topical daily  enoxaparin Injectable 50 milliGRAM(s) SubCutaneous every 12 hours  erythromycin   Ointment 1 Application(s) Both EYES daily  gabapentin 300 milliGRAM(s) Oral every 12 hours  lactated ringers. 1000 milliLiter(s) (60 mL/Hr) IV Continuous <Continuous>  levETIRAcetam  IVPB 500 milliGRAM(s) IV Intermittent two times a day  levoFLOXacin IVPB      levoFLOXacin IVPB 750 milliGRAM(s) IV Intermittent every 24 hours  melatonin 3 milliGRAM(s) Oral at bedtime  meropenem  IVPB 1000 milliGRAM(s) IV Intermittent every 8 hours  methylPREDNISolone sodium succinate Injectable 40 milliGRAM(s) IV Push daily  midodrine. 20 milliGRAM(s) Oral three times a day  pantoprazole  Injectable 40 milliGRAM(s) IV Push every 24 hours  polyethylene glycol 3350 17 Gram(s) Oral at bedtime  raloxifene 60 milliGRAM(s) Oral daily  saccharomyces boulardii 250 milliGRAM(s) Oral two times a day  senna 2 Tablet(s) Oral at bedtime  sodium chloride 0.65% Nasal 2 Spray(s) Both Nostrils three times a day  sodium chloride 0.9% Bolus 500 milliLiter(s) IV Bolus once  vancomycin  IVPB 750 milliGRAM(s) IV Intermittent every 12 hours    MEDICATIONS  (PRN):  acetaminophen     Tablet .. 650 milliGRAM(s) Oral every 8 hours PRN Temp greater or equal to 38.5C (101.3F)  aluminum hydroxide/magnesium hydroxide/simethicone Suspension 30 milliLiter(s) Oral every 4 hours PRN Dyspepsia  ondansetron Injectable 4 milliGRAM(s) IV Push every 8 hours PRN Nausea and/or Vomiting      Allergies  No Known Allergies      Review of Systems:   Cardiovascular:  No Chest Pain, No Palpitations  Respiratory:  No Cough, No Dyspnea  Gastrointestinal:  As described in HPI  Skin:  No Skin Lesions, No Jaundice  Neuro:  No Syncope, No Dizziness    Physical Examination:  T(C): 36.3 (03-20-24 @ 07:59), Max: 36.3 (03-20-24 @ 07:59)  HR: 90 (03-20-24 @ 17:30) (62 - 100)  BP: 96/55 (03-20-24 @ 17:30) (79/50 - 126/66)  RR: 18 (03-20-24 @ 17:30) (18 - 18)  SpO2: 97% (03-20-24 @ 17:30) (95% - 97%)      03-19-24 @ 07:01  -  03-20-24 @ 07:00  --------------------------------------------------------  IN: 2670 mL / OUT: 400 mL / NET: 2270 mL        GENERAL: AAOx0, no acute distress.  HEAD:  Atraumatic, Normocephalic  EYES: conjunctiva and sclera clear  NECK: Supple, no JVD or thyromegaly  CHEST/LUNG: decreased BS  HEART: Regular rate and rhythm; normal S1, S2, No murmurs.  ABDOMEN: Soft, nontender, nondistended; Bowel sounds present  NEUROLOGY: No asterixis or tremor.   SKIN: no jaundice     Data:                        8.3    7.55  )-----------( 449      ( 20 Mar 2024 07:34 )             27.7     Hgb trend:  8.3  03-20-24 @ 07:34  9.1  03-18-24 @ 06:14        03-20    133<L>  |  96<L>  |  9<L>  ----------------------------<  128<H>  3.7   |  23  |  0.5<L>    Ca    8.6      20 Mar 2024 07:34  Mg     1.8     03-20    TPro  5.9<L>  /  Alb  2.8<L>  /  TBili  0.3  /  DBili  x   /  AST  19  /  ALT  9   /  AlkPhos  73  03-20    Liver panel trend:  TBili 0.3   /   AST 19   /   ALT 9   /   AlkP 73   /   Tptn 5.9   /   Alb 2.8    /   DBili --      03-20  TBili 0.3   /   AST 11   /   ALT 10   /   AlkP 83   /   Tptn 6.9   /   Alb 3.2    /   DBili --      03-18  TBili 0.2   /   AST 20   /   ALT 12   /   AlkP 88   /   Tptn 6.9   /   Alb 3.1    /   DBili --      03-17  TBili <0.2   /   AST 14   /   ALT 13   /   AlkP 88   /   Tptn 7.2   /   Alb 3.0    /   DBili --      03-16  TBili 0.5   /   AST 17   /   ALT 13   /   AlkP 95   /   Tptn 7.0   /   Alb 3.0    /   DBili --      03-14  TBili 0.3   /   AST 17   /   ALT 13   /   AlkP 108   /   Tptn 7.6   /   Alb 3.4    /   DBili --      03-13  TBili 0.2   /   AST 14   /   ALT 10   /   AlkP 106   /   Tptn 7.4   /   Alb 3.3    /   DBili --      03-12  TBili <0.2   /   AST 12   /   ALT 9   /   AlkP 103   /   Tptn 7.0   /   Alb 3.0    /   DBili --      03-11             Radiology:

## 2024-03-21 LAB
ALBUMIN SERPL ELPH-MCNC: 2.9 G/DL — LOW (ref 3.5–5.2)
ALP SERPL-CCNC: 74 U/L — SIGNIFICANT CHANGE UP (ref 30–115)
ALT FLD-CCNC: 9 U/L — SIGNIFICANT CHANGE UP (ref 0–41)
ANION GAP SERPL CALC-SCNC: 13 MMOL/L — SIGNIFICANT CHANGE UP (ref 7–14)
AST SERPL-CCNC: 16 U/L — SIGNIFICANT CHANGE UP (ref 0–41)
BASOPHILS # BLD AUTO: 0.01 K/UL — SIGNIFICANT CHANGE UP (ref 0–0.2)
BASOPHILS NFR BLD AUTO: 0.2 % — SIGNIFICANT CHANGE UP (ref 0–1)
BILIRUB SERPL-MCNC: 0.2 MG/DL — SIGNIFICANT CHANGE UP (ref 0.2–1.2)
BUN SERPL-MCNC: 8 MG/DL — LOW (ref 10–20)
CALCIUM SERPL-MCNC: 8.8 MG/DL — SIGNIFICANT CHANGE UP (ref 8.4–10.5)
CHLORIDE SERPL-SCNC: 100 MMOL/L — SIGNIFICANT CHANGE UP (ref 98–110)
CO2 SERPL-SCNC: 27 MMOL/L — SIGNIFICANT CHANGE UP (ref 17–32)
CREAT SERPL-MCNC: 0.5 MG/DL — LOW (ref 0.7–1.5)
EGFR: 109 ML/MIN/1.73M2 — SIGNIFICANT CHANGE UP
EOSINOPHIL # BLD AUTO: 0.18 K/UL — SIGNIFICANT CHANGE UP (ref 0–0.7)
EOSINOPHIL NFR BLD AUTO: 2.8 % — SIGNIFICANT CHANGE UP (ref 0–8)
GLUCOSE BLDC GLUCOMTR-MCNC: 104 MG/DL — HIGH (ref 70–99)
GLUCOSE BLDC GLUCOMTR-MCNC: 125 MG/DL — HIGH (ref 70–99)
GLUCOSE BLDC GLUCOMTR-MCNC: 151 MG/DL — HIGH (ref 70–99)
GLUCOSE BLDC GLUCOMTR-MCNC: 153 MG/DL — HIGH (ref 70–99)
GLUCOSE SERPL-MCNC: 100 MG/DL — HIGH (ref 70–99)
HCT VFR BLD CALC: 28 % — LOW (ref 37–47)
HGB BLD-MCNC: 8.4 G/DL — LOW (ref 12–16)
IMM GRANULOCYTES NFR BLD AUTO: 0.5 % — HIGH (ref 0.1–0.3)
LYMPHOCYTES # BLD AUTO: 0.95 K/UL — LOW (ref 1.2–3.4)
LYMPHOCYTES # BLD AUTO: 14.8 % — LOW (ref 20.5–51.1)
MAGNESIUM SERPL-MCNC: 1.9 MG/DL — SIGNIFICANT CHANGE UP (ref 1.8–2.4)
MCHC RBC-ENTMCNC: 23.3 PG — LOW (ref 27–31)
MCHC RBC-ENTMCNC: 30 G/DL — LOW (ref 32–37)
MCV RBC AUTO: 77.8 FL — LOW (ref 81–99)
MONOCYTES # BLD AUTO: 0.33 K/UL — SIGNIFICANT CHANGE UP (ref 0.1–0.6)
MONOCYTES NFR BLD AUTO: 5.1 % — SIGNIFICANT CHANGE UP (ref 1.7–9.3)
NEUTROPHILS # BLD AUTO: 4.93 K/UL — SIGNIFICANT CHANGE UP (ref 1.4–6.5)
NEUTROPHILS NFR BLD AUTO: 76.6 % — HIGH (ref 42.2–75.2)
NRBC # BLD: 0 /100 WBCS — SIGNIFICANT CHANGE UP (ref 0–0)
PLATELET # BLD AUTO: 468 K/UL — HIGH (ref 130–400)
PMV BLD: 10 FL — SIGNIFICANT CHANGE UP (ref 7.4–10.4)
POTASSIUM SERPL-MCNC: 4 MMOL/L — SIGNIFICANT CHANGE UP (ref 3.5–5)
POTASSIUM SERPL-SCNC: 4 MMOL/L — SIGNIFICANT CHANGE UP (ref 3.5–5)
PROT SERPL-MCNC: 6.1 G/DL — SIGNIFICANT CHANGE UP (ref 6–8)
RBC # BLD: 3.6 M/UL — LOW (ref 4.2–5.4)
RBC # FLD: 20.2 % — HIGH (ref 11.5–14.5)
SODIUM SERPL-SCNC: 140 MMOL/L — SIGNIFICANT CHANGE UP (ref 135–146)
VANCOMYCIN FLD-MCNC: 19.9 UG/ML — HIGH (ref 5–10)
WBC # BLD: 6.43 K/UL — SIGNIFICANT CHANGE UP (ref 4.8–10.8)
WBC # FLD AUTO: 6.43 K/UL — SIGNIFICANT CHANGE UP (ref 4.8–10.8)

## 2024-03-21 PROCEDURE — 99232 SBSQ HOSP IP/OBS MODERATE 35: CPT

## 2024-03-21 PROCEDURE — 71045 X-RAY EXAM CHEST 1 VIEW: CPT | Mod: 26

## 2024-03-21 RX ADMIN — Medication 4 MILLILITER(S): at 14:42

## 2024-03-21 RX ADMIN — MEROPENEM 100 MILLIGRAM(S): 1 INJECTION INTRAVENOUS at 05:47

## 2024-03-21 RX ADMIN — Medication 2 SPRAY(S): at 13:33

## 2024-03-21 RX ADMIN — LEVETIRACETAM 400 MILLIGRAM(S): 250 TABLET, FILM COATED ORAL at 05:45

## 2024-03-21 RX ADMIN — Medication 3 MILLIGRAM(S): at 21:53

## 2024-03-21 RX ADMIN — Medication 1 APPLICATION(S): at 05:48

## 2024-03-21 RX ADMIN — MIDODRINE HYDROCHLORIDE 20 MILLIGRAM(S): 2.5 TABLET ORAL at 17:20

## 2024-03-21 RX ADMIN — SODIUM CHLORIDE 60 MILLILITER(S): 9 INJECTION, SOLUTION INTRAVENOUS at 17:19

## 2024-03-21 RX ADMIN — PANTOPRAZOLE SODIUM 40 MILLIGRAM(S): 20 TABLET, DELAYED RELEASE ORAL at 05:45

## 2024-03-21 RX ADMIN — Medication 2 SPRAY(S): at 05:46

## 2024-03-21 RX ADMIN — SENNA PLUS 2 TABLET(S): 8.6 TABLET ORAL at 21:53

## 2024-03-21 RX ADMIN — Medication 1 DROP(S): at 17:20

## 2024-03-21 RX ADMIN — GABAPENTIN 300 MILLIGRAM(S): 400 CAPSULE ORAL at 17:20

## 2024-03-21 RX ADMIN — Medication 250 MILLIGRAM(S): at 17:20

## 2024-03-21 RX ADMIN — Medication 1 DROP(S): at 05:48

## 2024-03-21 RX ADMIN — Medication 3 MILLILITER(S): at 14:42

## 2024-03-21 RX ADMIN — Medication 40 MILLIGRAM(S): at 05:46

## 2024-03-21 RX ADMIN — POLYETHYLENE GLYCOL 3350 17 GRAM(S): 17 POWDER, FOR SOLUTION ORAL at 21:53

## 2024-03-21 RX ADMIN — ENOXAPARIN SODIUM 50 MILLIGRAM(S): 100 INJECTION SUBCUTANEOUS at 05:45

## 2024-03-21 RX ADMIN — LEVETIRACETAM 400 MILLIGRAM(S): 250 TABLET, FILM COATED ORAL at 17:21

## 2024-03-21 RX ADMIN — Medication 1 APPLICATION(S): at 11:41

## 2024-03-21 RX ADMIN — Medication 2 SPRAY(S): at 21:52

## 2024-03-21 RX ADMIN — Medication 4 MILLILITER(S): at 09:02

## 2024-03-21 RX ADMIN — Medication 3 MILLILITER(S): at 21:20

## 2024-03-21 RX ADMIN — Medication 3 MILLILITER(S): at 09:01

## 2024-03-21 RX ADMIN — Medication 4 MILLILITER(S): at 21:19

## 2024-03-21 RX ADMIN — CHLORHEXIDINE GLUCONATE 1 APPLICATION(S): 213 SOLUTION TOPICAL at 11:41

## 2024-03-21 NOTE — PROGRESS NOTE ADULT - SUBJECTIVE AND OBJECTIVE BOX
HPI  Patient is a 58y old Female who presents with a chief complaint of Respiratory Distress (21 Mar 2024 11:35)    Currently admitted to medicine with the primary diagnosis of Sepsis with acute hypoxic respiratory failure       Today is hospital day 61d.     INTERVAL HPI / OVERNIGHT EVENTS:  Patient was seen and examined at bedside  no new changes  opens eyes  does not follow commands  non verbal          PAST MEDICAL & SURGICAL HISTORY  Down syndrome    Osteoporosis    Mild anemia    Neuropathy    S/P debridement  of R hip on 3/2/21      ALLERGIES  No Known Allergies    MEDICATIONS  STANDING MEDICATIONS  acetylcysteine 20%  Inhalation 4 milliLiter(s) Inhalation every 6 hours  albuterol/ipratropium for Nebulization 3 milliLiter(s) Nebulizer every 6 hours  AQUAPHOR (petrolatum Ointment) 1 Application(s) Topical two times a day  artificial  tears Solution 1 Drop(s) Both EYES two times a day  chlorhexidine 2% Cloths 1 Application(s) Topical daily  erythromycin   Ointment 1 Application(s) Both EYES daily  gabapentin 300 milliGRAM(s) Oral every 12 hours  lactated ringers. 1000 milliLiter(s) IV Continuous <Continuous>  levETIRAcetam  IVPB 500 milliGRAM(s) IV Intermittent two times a day  melatonin 3 milliGRAM(s) Oral at bedtime  midodrine. 20 milliGRAM(s) Oral three times a day  pantoprazole  Injectable 40 milliGRAM(s) IV Push every 24 hours  polyethylene glycol 3350 17 Gram(s) Oral at bedtime  predniSONE   Tablet 40 milliGRAM(s) Oral daily  raloxifene 60 milliGRAM(s) Oral daily  saccharomyces boulardii 250 milliGRAM(s) Oral two times a day  senna 2 Tablet(s) Oral at bedtime  sodium chloride 0.65% Nasal 2 Spray(s) Both Nostrils three times a day  sodium chloride 0.9% Bolus 500 milliLiter(s) IV Bolus once    PRN MEDICATIONS  acetaminophen     Tablet .. 650 milliGRAM(s) Oral every 8 hours PRN  aluminum hydroxide/magnesium hydroxide/simethicone Suspension 30 milliLiter(s) Oral every 4 hours PRN  ondansetron Injectable 4 milliGRAM(s) IV Push every 8 hours PRN    VITALS:  T(F): 97.9  HR: 68  BP: 134/67  RR: 18  SpO2: 96%    PHYSICAL EXAM  NG tube present  oxygen NC present  GEN: no distress, comfortable  PULM: BS heard b/l equal, No wheezing  CVS: S1S2 present, systolic murmur present  ABD: Soft, non-distended, no guarding; non-tender  EXT: No lower extremity edema; b/l feet padded  NEURO: awake; opens eyes; non verbal     LABS                        8.4    6.43  )-----------( 468      ( 21 Mar 2024 07:45 )             28.0     03-21    140  |  100  |  8<L>  ----------------------------<  100<H>  4.0   |  27  |  0.5<L>    Ca    8.8      21 Mar 2024 07:45  Mg     1.9     03-21    TPro  6.1  /  Alb  2.9<L>  /  TBili  0.2  /  DBili  x   /  AST  16  /  ALT  9   /  AlkPhos  74  03-21      Urinalysis Basic - ( 21 Mar 2024 07:45 )    Color: x / Appearance: x / SG: x / pH: x  Gluc: 100 mg/dL / Ketone: x  / Bili: x / Urobili: x   Blood: x / Protein: x / Nitrite: x   Leuk Esterase: x / RBC: x / WBC x   Sq Epi: x / Non Sq Epi: x / Bacteria: x                RADIOLOGY

## 2024-03-21 NOTE — PROGRESS NOTE ADULT - ASSESSMENT
58F w/ PMHx Down Syndrome, nonverbal at baseline, Hypothyroidism, Cerebral palsy and Seizure Disorders presents to the ED from nursing facility/group home ([facility]) presented to ED for respiratory distress and high grade fever. Patient found to be septic on admission.Admitted to SDU for management of acute respiratory distress 2/2 CAP/Aspiration PNA (20 Jan 2024 18:22)  While pt was on the floor she developed dyspnea after swallow evaluation, was spiking high grade fever, upgraded to SDU. Patient was being managed for acute hypoxic respiratory failure required requiring oxygen supplement Patient failed speech and swallow evaluation.  SLP recommended n.p.o. with plan oral means of nutrition.  GI consulted to consider  PEG placement.    # Oropharyngeal dysphagia, PEG evaluation   speech and swallow recommendation.  N.p.o. with nonoral means of nutrition.  Sepsis, and respiratory failure NC O2  - recalled for PEG 3/20: patient on 4L NC O2  - no fever, no leucocytosis  - on lovenox therauptic  - patient has hypotensive episodes on midodrine  - consent forms sent by  to CAB    Plan  NG tube feeding for now  Patient needs to be off lovenox 24 hours before procedure  Please recall us once consent is obtained.

## 2024-03-21 NOTE — PROGRESS NOTE ADULT - SUBJECTIVE AND OBJECTIVE BOX
Gastroenterology progress note:     Patient is a 58y old  Female who presents with a chief complaint of Respiratory Distress (20 Mar 2024 20:48)       Admitted on: 01-20-24    We are following the patient for: PEG eval       Interval History: consent forms sent by       PAST MEDICAL & SURGICAL HISTORY:  Down syndrome      Osteoporosis      Mild anemia      Neuropathy      S/P debridement  of R hip on 3/2/21          MEDICATIONS  (STANDING):  acetylcysteine 20%  Inhalation 4 milliLiter(s) Inhalation every 6 hours  albuterol/ipratropium for Nebulization 3 milliLiter(s) Nebulizer every 6 hours  AQUAPHOR (petrolatum Ointment) 1 Application(s) Topical two times a day  artificial  tears Solution 1 Drop(s) Both EYES two times a day  chlorhexidine 2% Cloths 1 Application(s) Topical daily  erythromycin   Ointment 1 Application(s) Both EYES daily  gabapentin 300 milliGRAM(s) Oral every 12 hours  lactated ringers. 1000 milliLiter(s) (60 mL/Hr) IV Continuous <Continuous>  levETIRAcetam  IVPB 500 milliGRAM(s) IV Intermittent two times a day  melatonin 3 milliGRAM(s) Oral at bedtime  methylPREDNISolone sodium succinate Injectable 40 milliGRAM(s) IV Push daily  midodrine. 20 milliGRAM(s) Oral three times a day  pantoprazole  Injectable 40 milliGRAM(s) IV Push every 24 hours  polyethylene glycol 3350 17 Gram(s) Oral at bedtime  raloxifene 60 milliGRAM(s) Oral daily  saccharomyces boulardii 250 milliGRAM(s) Oral two times a day  senna 2 Tablet(s) Oral at bedtime  sodium chloride 0.65% Nasal 2 Spray(s) Both Nostrils three times a day  sodium chloride 0.9% Bolus 500 milliLiter(s) IV Bolus once    MEDICATIONS  (PRN):  acetaminophen     Tablet .. 650 milliGRAM(s) Oral every 8 hours PRN Temp greater or equal to 38.5C (101.3F)  aluminum hydroxide/magnesium hydroxide/simethicone Suspension 30 milliLiter(s) Oral every 4 hours PRN Dyspepsia  ondansetron Injectable 4 milliGRAM(s) IV Push every 8 hours PRN Nausea and/or Vomiting      Allergies  No Known Allergies      Review of Systems:   Cardiovascular:  No Chest Pain, No Palpitations  Respiratory:  No Cough, No Dyspnea  Gastrointestinal:  As described in HPI  Skin:  No Skin Lesions, No Jaundice  Neuro:  No Syncope, No Dizziness    Physical Examination:  T(C): 36.6 (03-21-24 @ 08:24), Max: 36.6 (03-21-24 @ 08:24)  HR: 68 (03-21-24 @ 08:24) (62 - 97)  BP: 134/67 (03-21-24 @ 08:24) (79/50 - 142/88)  RR: 18 (03-21-24 @ 08:24) (18 - 18)  SpO2: 96% (03-21-24 @ 08:24) (94% - 97%)  Weight (kg): 52.3 (03-21-24 @ 06:59)    03-20-24 @ 07:01  -  03-21-24 @ 07:00  --------------------------------------------------------  IN: 720 mL / OUT: 1000 mL / NET: -280 mL    GENERAL: AAOx3, no acute distress.  HEAD:  Atraumatic, Normocephalic  EYES: conjunctiva and sclera clear  NECK: Supple, no JVD or thyromegaly  CHEST/LUNG: Clear to auscultation bilaterally; No wheeze, rhonchi, or rales  HEART: Regular rate and rhythm; normal S1, S2, No murmurs.  ABDOMEN: Soft, nontender, nondistended; Bowel sounds present  NEUROLOGY: No asterixis or tremor.   SKIN: Intact, no jaundice     Data:                        8.4    6.43  )-----------( 468      ( 21 Mar 2024 07:45 )             28.0     Hgb trend:  8.4  03-21-24 @ 07:45  8.3  03-20-24 @ 07:34        03-21    140  |  100  |  8<L>  ----------------------------<  100<H>  4.0   |  27  |  0.5<L>    Ca    8.8      21 Mar 2024 07:45  Mg     1.9     03-21    TPro  6.1  /  Alb  2.9<L>  /  TBili  0.2  /  DBili  x   /  AST  16  /  ALT  9   /  AlkPhos  74  03-21    Liver panel trend:  TBili 0.2   /   AST 16   /   ALT 9   /   AlkP 74   /   Tptn 6.1   /   Alb 2.9    /   DBili --      03-21  TBili 0.3   /   AST 19   /   ALT 9   /   AlkP 73   /   Tptn 5.9   /   Alb 2.8    /   DBili --      03-20  TBili 0.3   /   AST 11   /   ALT 10   /   AlkP 83   /   Tptn 6.9   /   Alb 3.2    /   DBili --      03-18  TBili 0.2   /   AST 20   /   ALT 12   /   AlkP 88   /   Tptn 6.9   /   Alb 3.1    /   DBili --      03-17  TBili <0.2   /   AST 14   /   ALT 13   /   AlkP 88   /   Tptn 7.2   /   Alb 3.0    /   DBili --      03-16  TBili 0.5   /   AST 17   /   ALT 13   /   AlkP 95   /   Tptn 7.0   /   Alb 3.0    /   DBili --      03-14  TBili 0.3   /   AST 17   /   ALT 13   /   AlkP 108   /   Tptn 7.6   /   Alb 3.4    /   DBili --      03-13  TBili 0.2   /   AST 14   /   ALT 10   /   AlkP 106   /   Tptn 7.4   /   Alb 3.3    /   DBili --      03-12

## 2024-03-21 NOTE — PROGRESS NOTE ADULT - ASSESSMENT
58F w/ PMHx Down Syndrome, nonverbal at baseline, Hypothyroidism, Cerebral palsy and Seizure Disorders presents to the ED from nursing facility/group home ([facility]) presented to ED for respiratory distress and high grade fever. Patient found to be septic on admission. Admitted to SDU for management of acute respiratory distress 2/2 CAP/Aspiration PNA (20 Jan 2024 18:22)  While pt was on the floor she developed dyspnea  consulted by pulmonary/critical care and was upgraded back to SDU.      A/P    # Sepsis POA   - Acute hypoxic resp failure  multifactorial RSV bronchiolitis on admission; then concern for   possible gram negative pneumonia and suspected aspiration  - Staph epidermidis bacteremia in 3/14  - staph hominis on on presentation - possible contaminant  repeat cultures 3/15- NGTD  ID following- recommending danna and levaquin for 7 days till 3/20; and vanc 5 days from time of negative cultures- to remove midline after todays antibiotics  - Remove midline ( was put in on 2/26) -- staph epidermidis on 3/13 and repeat bl cx on 3/15 was negative  - Vanc dosing AUC/ZANE per clinical pharmacist x 14 days, or remove midline x 5 days from cleared BCX  fungitell -81; no furether intervenion  - change IV steroids to PO  - currently on nasal cannula 2L; -  wean down on oxygen as tolerated    # dysphagia- plan for PEG  hold lovenox prior to PEG  pending consent from advisory board- [facility]   [employer] phone number is [phone]    # Seizure Disorder  - c/w  Keppra   - seizure precautions  - keep Mg above 2.0     # H/o lower ext DVT   - c/w therapeutic Lovenox, Eliquis on dc    #  Hypothyroidism  - TSH 0.51    # microcytic Anemia, anemia of chronic disease   - monitor H/H,  ferritin normal; transferring saturation <6%; possible iron deficiency    #  Elevated Troponin , type 2 MI/  Sinus tachycardia- c/w  metoprolol  - TTE 2/19 normal EF no DD or valve abnormalities    # Down Syndrome/  Cerebral Palsy/ functional quadriplegia   - supportive care  - prevent falls and aspiration   - failed speech and swallow, might  need  PEG ( see above)  - on Raloxifene     # multiple pressure ulcers- continue wound care per recommendation from wound care; d/w nursing staff    # hypotension- on midodrine; continue    Palliative care team is following ,  pt is  DNR/DNI ( approved by [facility]) , obtain consent  for PEG tube from Consumer Advisory Board [place] [employer]    Pending: PEG placement, weaning off oxygen and steroids; monitoring BP    Time spent 35 minutes on chart review and coordination of care.

## 2024-03-22 ENCOUNTER — APPOINTMENT (OUTPATIENT)
Dept: PODIATRY | Facility: CLINIC | Age: 59
End: 2024-03-22

## 2024-03-22 LAB
ALBUMIN SERPL ELPH-MCNC: 3.1 G/DL — LOW (ref 3.5–5.2)
ALP SERPL-CCNC: 77 U/L — SIGNIFICANT CHANGE UP (ref 30–115)
ALT FLD-CCNC: 9 U/L — SIGNIFICANT CHANGE UP (ref 0–41)
ANION GAP SERPL CALC-SCNC: 10 MMOL/L — SIGNIFICANT CHANGE UP (ref 7–14)
AST SERPL-CCNC: 12 U/L — SIGNIFICANT CHANGE UP (ref 0–41)
BASOPHILS # BLD AUTO: 0.01 K/UL — SIGNIFICANT CHANGE UP (ref 0–0.2)
BASOPHILS NFR BLD AUTO: 0.1 % — SIGNIFICANT CHANGE UP (ref 0–1)
BILIRUB SERPL-MCNC: 0.3 MG/DL — SIGNIFICANT CHANGE UP (ref 0.2–1.2)
BUN SERPL-MCNC: 6 MG/DL — LOW (ref 10–20)
CALCIUM SERPL-MCNC: 9.1 MG/DL — SIGNIFICANT CHANGE UP (ref 8.4–10.5)
CHLORIDE SERPL-SCNC: 102 MMOL/L — SIGNIFICANT CHANGE UP (ref 98–110)
CO2 SERPL-SCNC: 31 MMOL/L — SIGNIFICANT CHANGE UP (ref 17–32)
CREAT SERPL-MCNC: <0.5 MG/DL — LOW (ref 0.7–1.5)
EGFR: 115 ML/MIN/1.73M2 — SIGNIFICANT CHANGE UP
EOSINOPHIL # BLD AUTO: 0.08 K/UL — SIGNIFICANT CHANGE UP (ref 0–0.7)
EOSINOPHIL NFR BLD AUTO: 0.9 % — SIGNIFICANT CHANGE UP (ref 0–8)
GLUCOSE BLDC GLUCOMTR-MCNC: 136 MG/DL — HIGH (ref 70–99)
GLUCOSE BLDC GLUCOMTR-MCNC: 168 MG/DL — HIGH (ref 70–99)
GLUCOSE BLDC GLUCOMTR-MCNC: 177 MG/DL — HIGH (ref 70–99)
GLUCOSE BLDC GLUCOMTR-MCNC: 186 MG/DL — HIGH (ref 70–99)
GLUCOSE SERPL-MCNC: 109 MG/DL — HIGH (ref 70–99)
HCT VFR BLD CALC: 29.4 % — LOW (ref 37–47)
HGB BLD-MCNC: 8.6 G/DL — LOW (ref 12–16)
IMM GRANULOCYTES NFR BLD AUTO: 0.5 % — HIGH (ref 0.1–0.3)
LYMPHOCYTES # BLD AUTO: 1.09 K/UL — LOW (ref 1.2–3.4)
LYMPHOCYTES # BLD AUTO: 12.6 % — LOW (ref 20.5–51.1)
MAGNESIUM SERPL-MCNC: 1.9 MG/DL — SIGNIFICANT CHANGE UP (ref 1.8–2.4)
MCHC RBC-ENTMCNC: 23.1 PG — LOW (ref 27–31)
MCHC RBC-ENTMCNC: 29.3 G/DL — LOW (ref 32–37)
MCV RBC AUTO: 79 FL — LOW (ref 81–99)
MONOCYTES # BLD AUTO: 0.37 K/UL — SIGNIFICANT CHANGE UP (ref 0.1–0.6)
MONOCYTES NFR BLD AUTO: 4.3 % — SIGNIFICANT CHANGE UP (ref 1.7–9.3)
NEUTROPHILS # BLD AUTO: 7.03 K/UL — HIGH (ref 1.4–6.5)
NEUTROPHILS NFR BLD AUTO: 81.6 % — HIGH (ref 42.2–75.2)
NRBC # BLD: 0 /100 WBCS — SIGNIFICANT CHANGE UP (ref 0–0)
PLATELET # BLD AUTO: 484 K/UL — HIGH (ref 130–400)
PMV BLD: 9.9 FL — SIGNIFICANT CHANGE UP (ref 7.4–10.4)
POTASSIUM SERPL-MCNC: 3.8 MMOL/L — SIGNIFICANT CHANGE UP (ref 3.5–5)
POTASSIUM SERPL-SCNC: 3.8 MMOL/L — SIGNIFICANT CHANGE UP (ref 3.5–5)
PROT SERPL-MCNC: 6.2 G/DL — SIGNIFICANT CHANGE UP (ref 6–8)
RBC # BLD: 3.72 M/UL — LOW (ref 4.2–5.4)
RBC # FLD: 20.4 % — HIGH (ref 11.5–14.5)
SODIUM SERPL-SCNC: 143 MMOL/L — SIGNIFICANT CHANGE UP (ref 135–146)
WBC # BLD: 8.62 K/UL — SIGNIFICANT CHANGE UP (ref 4.8–10.8)
WBC # FLD AUTO: 8.62 K/UL — SIGNIFICANT CHANGE UP (ref 4.8–10.8)

## 2024-03-22 PROCEDURE — 99232 SBSQ HOSP IP/OBS MODERATE 35: CPT

## 2024-03-22 RX ORDER — ENOXAPARIN SODIUM 100 MG/ML
50 INJECTION SUBCUTANEOUS EVERY 12 HOURS
Refills: 0 | Status: DISCONTINUED | OUTPATIENT
Start: 2024-03-22 | End: 2024-03-28

## 2024-03-22 RX ADMIN — Medication 2 SPRAY(S): at 13:12

## 2024-03-22 RX ADMIN — LEVETIRACETAM 400 MILLIGRAM(S): 250 TABLET, FILM COATED ORAL at 17:53

## 2024-03-22 RX ADMIN — Medication 4 MILLILITER(S): at 02:23

## 2024-03-22 RX ADMIN — Medication 1 APPLICATION(S): at 11:37

## 2024-03-22 RX ADMIN — Medication 4 MILLILITER(S): at 08:27

## 2024-03-22 RX ADMIN — Medication 3 MILLILITER(S): at 21:09

## 2024-03-22 RX ADMIN — Medication 1 APPLICATION(S): at 05:32

## 2024-03-22 RX ADMIN — MIDODRINE HYDROCHLORIDE 20 MILLIGRAM(S): 2.5 TABLET ORAL at 11:37

## 2024-03-22 RX ADMIN — LEVETIRACETAM 400 MILLIGRAM(S): 250 TABLET, FILM COATED ORAL at 05:30

## 2024-03-22 RX ADMIN — POLYETHYLENE GLYCOL 3350 17 GRAM(S): 17 POWDER, FOR SOLUTION ORAL at 21:55

## 2024-03-22 RX ADMIN — Medication 1 DROP(S): at 17:54

## 2024-03-22 RX ADMIN — Medication 3 MILLILITER(S): at 02:23

## 2024-03-22 RX ADMIN — PANTOPRAZOLE SODIUM 40 MILLIGRAM(S): 20 TABLET, DELAYED RELEASE ORAL at 05:31

## 2024-03-22 RX ADMIN — MIDODRINE HYDROCHLORIDE 20 MILLIGRAM(S): 2.5 TABLET ORAL at 17:53

## 2024-03-22 RX ADMIN — Medication 3 MILLIGRAM(S): at 21:49

## 2024-03-22 RX ADMIN — RALOXIFENE HYDROCHLORIDE 60 MILLIGRAM(S): 60 TABLET, COATED ORAL at 11:37

## 2024-03-22 RX ADMIN — ENOXAPARIN SODIUM 50 MILLIGRAM(S): 100 INJECTION SUBCUTANEOUS at 21:49

## 2024-03-22 RX ADMIN — Medication 1 APPLICATION(S): at 17:53

## 2024-03-22 RX ADMIN — Medication 3 MILLILITER(S): at 08:27

## 2024-03-22 RX ADMIN — Medication 1 DROP(S): at 05:32

## 2024-03-22 RX ADMIN — GABAPENTIN 300 MILLIGRAM(S): 400 CAPSULE ORAL at 17:53

## 2024-03-22 RX ADMIN — Medication 2 SPRAY(S): at 22:00

## 2024-03-22 RX ADMIN — Medication 2 SPRAY(S): at 05:32

## 2024-03-22 RX ADMIN — Medication 40 MILLIGRAM(S): at 05:31

## 2024-03-22 RX ADMIN — ENOXAPARIN SODIUM 50 MILLIGRAM(S): 100 INJECTION SUBCUTANEOUS at 11:37

## 2024-03-22 RX ADMIN — GABAPENTIN 300 MILLIGRAM(S): 400 CAPSULE ORAL at 05:31

## 2024-03-22 RX ADMIN — MIDODRINE HYDROCHLORIDE 20 MILLIGRAM(S): 2.5 TABLET ORAL at 05:30

## 2024-03-22 RX ADMIN — CHLORHEXIDINE GLUCONATE 1 APPLICATION(S): 213 SOLUTION TOPICAL at 11:38

## 2024-03-22 RX ADMIN — SENNA PLUS 2 TABLET(S): 8.6 TABLET ORAL at 21:49

## 2024-03-22 NOTE — PROGRESS NOTE ADULT - SUBJECTIVE AND OBJECTIVE BOX
24H events:    Patient is a 58y old Female who presents with a chief complaint of Respiratory Distress (21 Mar 2024 15:31)    Primary diagnosis of Sepsis with acute hypoxic respiratory failure        Today is 62d of hospitalization. This morning patient was seen and examined at bedside, resting comfortably in bed.    No acute or major events overnight.      PAST MEDICAL & SURGICAL HISTORY  Down syndrome    Osteoporosis    Mild anemia    Neuropathy    S/P debridement  of R hip on 3/2/21      SOCIAL HISTORY:  Social History:      ALLERGIES:  No Known Allergies    MEDICATIONS:  STANDING MEDICATIONS  acetylcysteine 20%  Inhalation 4 milliLiter(s) Inhalation every 6 hours  albuterol/ipratropium for Nebulization 3 milliLiter(s) Nebulizer every 6 hours  AQUAPHOR (petrolatum Ointment) 1 Application(s) Topical two times a day  artificial  tears Solution 1 Drop(s) Both EYES two times a day  chlorhexidine 2% Cloths 1 Application(s) Topical daily  enoxaparin Injectable 50 milliGRAM(s) SubCutaneous every 12 hours  erythromycin   Ointment 1 Application(s) Both EYES daily  gabapentin 300 milliGRAM(s) Oral every 12 hours  levETIRAcetam  IVPB 500 milliGRAM(s) IV Intermittent two times a day  melatonin 3 milliGRAM(s) Oral at bedtime  midodrine. 20 milliGRAM(s) Oral three times a day  pantoprazole  Injectable 40 milliGRAM(s) IV Push every 24 hours  polyethylene glycol 3350 17 Gram(s) Oral at bedtime  raloxifene 60 milliGRAM(s) Oral daily  saccharomyces boulardii 250 milliGRAM(s) Oral two times a day  senna 2 Tablet(s) Oral at bedtime  sodium chloride 0.65% Nasal 2 Spray(s) Both Nostrils three times a day  sodium chloride 0.9% Bolus 500 milliLiter(s) IV Bolus once    PRN MEDICATIONS  acetaminophen     Tablet .. 650 milliGRAM(s) Oral every 8 hours PRN  aluminum hydroxide/magnesium hydroxide/simethicone Suspension 30 milliLiter(s) Oral every 4 hours PRN  ondansetron Injectable 4 milliGRAM(s) IV Push every 8 hours PRN    VITALS:   T(F): 96.6  HR: 70  BP: 90/54  RR: 18  SpO2: 97%    PHYSICAL EXAM:    GENERAL: NAD, lying in bed comfortably, on NC 2L   HEAD:  Atraumatic, normocephalic  EYES: EOMI, PERRLA, conjunctiva and sclera clear  NECK: Supple, trachea midline, no JVD  HEART: Regular rate and rhythm, no murmurs, rubs, or gallops  LUNGS: Unlabored respirations.  Clear to auscultation bilaterally, no crackles, wheezing, or rhonchi  ABDOMEN: Soft, nontender, nondistended, +BS  EXTREMITIES: 2+ peripheral pulses bilaterally. No clubbing, cyanosis, or edema  NERVOUS SYSTEM:  A&Ox0 , moves extremities spontanouesly   SKIN: No rashes or lesions        LABS:                        8.6    8.62  )-----------( 484      ( 22 Mar 2024 08:09 )             29.4     03-22    143  |  102  |  6<L>  ----------------------------<  109<H>  3.8   |  31  |  <0.5<L>    Ca    9.1      22 Mar 2024 08:09  Mg     1.9     03-22    TPro  6.2  /  Alb  3.1<L>  /  TBili  0.3  /  DBili  x   /  AST  12  /  ALT  9   /  AlkPhos  77  03-22      Urinalysis Basic - ( 22 Mar 2024 08:09 )    Color: x / Appearance: x / SG: x / pH: x  Gluc: 109 mg/dL / Ketone: x  / Bili: x / Urobili: x   Blood: x / Protein: x / Nitrite: x   Leuk Esterase: x / RBC: x / WBC x   Sq Epi: x / Non Sq Epi: x / Bacteria: x

## 2024-03-22 NOTE — PRE-OP CHECKLIST - ORDERS/MEDICATION ADMINISTRATION RECORD ON CHART
[Nutrition/ Exercise/ Weight Management] : nutrition, exercise, weight management [Body Image] : body image [Vitamins/Supplements] : vitamins/supplements [Breast Self Exam] : breast self exam [Bladder Hygiene] : bladder hygiene [Contraception/ Emergency Contraception/ Safe Sexual Practices] : contraception, emergency contraception, safe sexual practices done

## 2024-03-22 NOTE — PROGRESS NOTE ADULT - ATTENDING COMMENTS
58F w/ PMHx Down Syndrome, nonverbal at baseline, Hypothyroidism, Cerebral palsy and Seizure Disorders presents to the ED from nursing facility/group home ([facility]) presented to ED for respiratory distress and high grade fever. Patient found to be septic on admission. Admitted to SDU for management of acute respiratory distress 2/2 CAP/Aspiration PNA (20 Jan 2024 18:22)  While pt was on the floor she developed dyspnea  consulted by pulmonary/critical care and was upgraded back to SDU.      A/P    # Sepsis POA   - Acute hypoxic resp failure  multifactorial RSV bronchiolitis on admission; then concern for   possible gram negative pneumonia and suspected aspiration  - Staph epidermidis bacteremia in 3/14  - staph hominis on on presentation - possible contaminant  repeat cultures 3/15- NGTD  ID following- recommending danna and levaquin for 7 days till 3/20; and vanc 5 days from time of negative cultures- to remove midline after todays antibiotics  - Remove midline ( was put in on 2/26) -- staph epidermidis on 3/13 and repeat bl cx on 3/15 was negative  - Vanc dosing AUC/ZANE per clinical pharmacist x 14 days, or remove midline x 5 days from cleared BCX  fungitell -81; no furether intervenion  - change IV steroids to PO  - currently on nasal cannula 2L; -  wean down on oxygen as tolerated    # dysphagia- plan for PEG  hold lovenox prior to PEG  pending consent from advisory board- [facility]   [employer] phone number is [phone]    # Seizure Disorder  - c/w  Keppra   - seizure precautions  - keep Mg above 2.0     # H/o lower ext DVT   - c/w therapeutic Lovenox, Eliquis on dc    #  Hypothyroidism  - TSH 0.51    # microcytic Anemia, anemia of chronic disease   - monitor H/H,  ferritin normal; transferring saturation <6%; possible iron deficiency    #  Elevated Troponin , type 2 MI/  Sinus tachycardia- c/w  metoprolol  - TTE 2/19 normal EF no DD or valve abnormalities    # Down Syndrome/  Cerebral Palsy/ functional quadriplegia   - supportive care  - prevent falls and aspiration   - failed speech and swallow, might  need  PEG ( see above)  - on Raloxifene     # multiple pressure ulcers- continue wound care per recommendation from wound care; d/w nursing staff    # hypotension- on midodrine; continue    Palliative care team is following ,  pt is  DNR/DNI ( approved by [facility]) , obtain consent  for PEG tube from Consumer Advisory Board [place] [employer]    Pending: PEG placement, consent and decision from advisory board regarding PEg placement; monitoring BP    Time spent 35 minutes

## 2024-03-22 NOTE — PROGRESS NOTE ADULT - ASSESSMENT
# Sepsis POA / Acute hypoxic resp failure / RSV bronchiolitis /  H/o Dysphagia/ suspected aspiration    - continue midodrine 20 Q8H  - completed caspo (end 3/10) per ID   - Failed FEES,  NG tube for feedings and medications, pt keeps failing ng tube, patient might  need PEG tube once 02 requirements decrease ( in case if level of care is still the same, consumer advisory board agreed for CMO ( paperwork in the the paper chart)   - gi consulted for PEG , hunter jhaveri ([phone]) for consent   - c/w duoneb,   - on 2L NC, wean as tolerated   - pulmonary is following  - per ID, sp meropenem 1g q8h IV and  levaquin 750mg q24h, until. 3/20         #  Elevated Troponin , type 2 MI/  Sinus tachycardia  - c/w telemetry monitoring   - Repeat EKG: Normal sinus rhythm  - c/w  metoprolol  - TTE 2/19 normal EF no DD or valve abnormalities    # Seizure Disorder  - c/w  Keppra   - seizure precautions  - keep Mg above 2.0     # H/o lower ext DVT   - c/w therapeutic Lovenox, Eliquis on dc    #  Hypothyroidism  - TSH 0.51    # Normocytic Anemia, anemia of chronic disease   - monitor H/H, keep Hb above 7.5     # Down Syndrome/  Cerebral Palsy/ functional quadriplegia   - supportive care  - prevent falls and aspiration   - failed speech and swallow, needs PEG as above  - gi consulted for peg, consent needed from advisory board number in chart   - on Raloxifene     Palliative follow up, patient is declining, if there is no improvement will change level of care to CMO ( change in level of care was approved by Consumer Advisory Board [place] [employer], paperwork in the paper chart)     Pending: PEG,    # Sepsis POA / Acute hypoxic resp failure / RSV bronchiolitis /  H/o Dysphagia/ suspected aspiration    - continue midodrine 20 Q8H  - completed caspo (end 3/10) per ID   - Failed FEES,  currently on NG tube; needs PEG  - gi consulted for PEG , hunter jhaveri ([phone]) for consent   - c/w duoneb,   - on 2L NC, wean as tolerated   - pulmonary is following  - per ID, sp meropenem 1g q8h IV and  levaquin 750mg q24h, until. 3/20     #  Elevated Troponin , type 2 MI/  Sinus tachycardia  - c/w telemetry monitoring   - Repeat EKG: Normal sinus rhythm  - c/w  metoprolol  - TTE 2/19 normal EF no DD or valve abnormalities    # Seizure Disorder  - c/w  Keppra   - seizure precautions  - keep Mg above 2.0     # H/o lower ext DVT   - c/w therapeutic Lovenox, Eliquis on dc    #  Hypothyroidism  - TSH 0.51    # Normocytic Anemia, anemia of chronic disease   - monitor H/H, keep Hb above 7.5     # Down Syndrome/  Cerebral Palsy/ functional quadriplegia   - supportive care  - prevent falls and aspiration   - failed speech and swallow, needs PEG as above  - gi consulted for peg, consent needed from advisory board number in chart   - on Raloxifene     Pending: PEG placement; decision and consent from advisory board for PEG placement

## 2024-03-22 NOTE — CHART NOTE - NSCHARTNOTEFT_GEN_A_CORE
Registered Dietitian Follow-Up     Patient Profile Reviewed                           Yes [x]   No []     Nutrition History Previously Obtained        Yes [x]  No []       Pertinent Subjective Information: Per RN patient NPO overnight for procedure, however procedure was cancelled, TF to resume at 12PM. RN reports currently giving 120 mL feeds; feeds have not been advanced.      Pertinent Medical Interventions: acute respiratory distress 2/2 CAP/Aspiration PNA   Oropharyngeal dysphagia, PEG evaluation - pending PEG placement   speech and swallow recommendation.  N.p.o. with non oral means of nutrition. failed FEES  Palliative 3/19 - would not consider CMO at this stage, respiratory status improving, appears to be clinically improving overall.  - will sign off  - reconsult as needed    Diet order: Diet, NPO with Tube Feed:   Tube Feeding Modality: Nasogastric  Jevity 1.2 [place]  Total Volume for 24 Hours (mL): 480  Bolus  Total Volume of Bolus (mL):  120  Tube Feed Frequency: Every 6 hours   Tube Feed Start Time: 15:00  Bolus Feed Rate (mL per Hour): 240   Bolus Feed Duration (in Hours): 0.5  Free Water Flush   Total Volume per Flush (mL): 100   Frequency: Every 6 Hours  Free Water Flush Instructions:  50 ml of free water flushes pre and post feeds  No Carb Prosource TF     Qty per Day:  1 (24 @ 14:25) [Active]    Anthropometrics:  Height (cm): 136.4 (24 @ 02:34)  Weight (kg): 52.3 (24 @ 02:34)  BMI (kg/m2): 28.1 (24 @ 02:34)  IBW: 29.5 kg     Daily Weight in k (-18), Weight in k (-17)  % Weight Change    MEDICATIONS  (STANDING):  acetylcysteine 20%  Inhalation 4 milliLiter(s) Inhalation every 6 hours  albuterol/ipratropium for Nebulization 3 milliLiter(s) Nebulizer every 6 hours  enoxaparin Injectable 50 milliGRAM(s) SubCutaneous every 12 hours  gabapentin 300 milliGRAM(s) Oral every 12 hours  levETIRAcetam  IVPB 500 milliGRAM(s) IV Intermittent two times a day  melatonin 3 milliGRAM(s) Oral at bedtime  midodrine. 20 milliGRAM(s) Oral three times a day  pantoprazole  Injectable 40 milliGRAM(s) IV Push every 24 hours  polyethylene glycol 3350 17 Gram(s) Oral at bedtime  raloxifene 60 milliGRAM(s) Oral daily  saccharomyces boulardii 250 milliGRAM(s) Oral two times a day  senna 2 Tablet(s) Oral at bedtime  sodium chloride 0.65% Nasal 2 Spray(s) Both Nostrils three times a day  sodium chloride 0.9% Bolus 500 milliLiter(s) IV Bolus once    MEDICATIONS  (PRN):  acetaminophen     Tablet .. 650 milliGRAM(s) Oral every 8 hours PRN Temp greater or equal to 38.5C (101.3F)  aluminum hydroxide/magnesium hydroxide/simethicone Suspension 30 milliLiter(s) Oral every 4 hours PRN Dyspepsia  ondansetron Injectable 4 milliGRAM(s) IV Push every 8 hours PRN Nausea and/or Vomiting    Pertinent Labs:  @ 08:09: Na 143, BUN 6<L>, Cr <0.5<L>, <H>, K+ 3.8, Phos --, Mg 1.9, Alk Phos 77, ALT/SGPT 9, AST/SGOT 12, HbA1c --    Finger Sticks:  POCT Blood Glucose.: 136 mg/dL ( @ 09:01)  POCT Blood Glucose.: 125 mg/dL ( @ 22:35)  POCT Blood Glucose.: 151 mg/dL ( @ 16:37)  POCT Blood Glucose.: 153 mg/dL ( @ 11:10)    Physical Findings:  - Appearance: non verbal; disoriented x4  - GI function: fecal incontinence   - Tubes: +NGT  - Oral/Mouth cavity: NPO per SLP  - Skin: R buttock blister, R heel unstageable, L lateral foot stage 2   - Edema: no edema noted     Nutrition Requirements  Weight Used: 52.3kg -Derived from nutrition note ()      Estimated Energy Needs    Continue [x]  Adjust []  Adjusted Energy Recommendations: 1307-1493kcal/day (MSJ x 1.4-1.6)     Estimated Protein Needs    Continue [x]  Adjust []  Adjusted Protein Recommendations: 68-84gm/day (1.3-1.5 gm/kg)      Estimated Fluid Needs        Continue [x]  Adjust []  Adjusted Fluid Recommendations: 1308-1569mL/day (25-30mL/kg)     Nutrient Intake: Jevity 1.2 120 mL q6H (480 mL daily) + 1 No Carb ProSource daily +50 mL free water pre/post feeds provides 636 kcal, 42 g protein, 387 mL free water from formula (787 free water total); current infusion meeting </= 50% estimated energy/protein needs     [x] Previous Nutrition Diagnosis: Increased Nutrient Needs            [x] Ongoing          [] Resolved     Nutrition Education: not appropriate at this time      Goal/Expected Outcome: Patient will meet >/= 75% nutrition needs via Enteral Nutrition in 3-5 days      Indicator/Monitoring: EN intake/tolerance, GI function, Nutrition Labs, NFPF, Weights     Recommendation: Advance EN regimen with Jevity 1.2 as able to goal of 300 mL q6H + No carb Prosource 1x daily.  This would provide: 1200 mL total volume, 1500 kcal, 81.6 gm Protein, 972 mL free water from formula + recommend 50 mL pres/post feeds + 30 mL pre/post prosource administration = 1432 mL total water     Continue to follow at high nutrition risk; f/u 3-5 days     Ama Dickson, MS, RDN  [employer]ra [phone] or via Teams

## 2024-03-22 NOTE — PROGRESS NOTE ADULT - ASSESSMENT
ASSESSMENT  58F w/ PMHx Down Syndrome, nonverbal at baseline, Hypothyroidism, Cerebral palsy and Seizure Disorders presents to the ED from nursing facility/group home presented to ED for respiratory distress and high grade fever.     IMPRESSION  #Bacteremia, Staphylococcus epidermidis, Methicillin resistant: Detec    3/15 BCX NG    3/13 BCX Staphylococcus epidermidis , has midline  #Hypoxemia    3/13 CT Chest Extensive patchy  left-sided pulmonary opacities, slightly decreased from   prior CT. Patchy right-sided opacities also demonstrated. Findings are   likely infectious in etiology. Recommend follow-up.    CXR 3/13 Bilateral interstitial densities may reflect vascular congestion.  #HAP / aspiration with cavitary PNA  < from: CT Chest w/ IV Cont (02.21.24 @ 00:09) >  Bilateral pneumonia, left worse than right, with a left upper lobe   thick-walled cavity. Diffuse opacification of the   left lung is seen with what appears to be some mucus plugging centrally   within the left mainstem bronchus. There is a pleural effusion.   Reactive mediastinal   lymph nodes are seen.    MRSA PCR Result.: Negative (02-20-24 @ 13:42)    Procalcitonin, Serum: 0.16 (02-19-24 @ 16:00)- unremarkable     2/17 BCX NGTD     Rapid RVP Result: NotDetec (02-17-24 @ 19:06)    2/12 BCX NGTD    Procalcitonin, Serum: 0.10 (02-12-24 @ 11:17)    Rapid RVP Result: NotDetec (02-12-24 @ 10:28)    MRSA PCR Result.: Negative (02-12-24 @ 10:28)    2/9 BCX NGTD x2    2/3 BCX NGTD     2/1 BCX NGTD     2/1 UCX   >=3 organisms. Probable collection contamination.    1/31 BCX NG    Rapid RVP Result: NotDetec (02-04-24 @ 10:28)    MRSA PCR Result.: Negative (02-03-24 @ 21:32)     UA without significant pyuria   Procalcitonin, Serum: 0.22 (02-01-24 @ 16:00)--> Procalcitonin, Serum: 0.15 (02-03-24 @ 11:13), downtrending ; unremarkable   MRSA PCR Result.: Negative (01-22-24 @ 05:30)  < from: Xray Chest 1 View-PORTABLE IMMEDIATE (Xray Chest 1 View-PORTABLE IMMEDIATE .) (02.01.24 @ 03:02) >  Bibasal opacities without significant change.    quantiferon gold negative  #Acute hypoxic respiratory failure- Gram Negative pneumonia   #1/20 BCX 1/4 bottles : Staphylococcus hominis- contaminant   Repeat CX NG   #Severe Sepsis on admission  #RSV + 1/21  #Elevated fungitell   serum aspergillus galactomannan and histo are (-), not at risk of PCP\  Fungitell: 81 (03-13-24 @ 17:24)  Fungitell: 76 (02-19-24 @ 16:00)  Fungitell: 63 (02-06-24 @ 11:27)  Fungitell: 325 (01-20-24 @ 21:23)  Fungitell: 138 (11-07-23 @ 08:08)  Fungitell: 115 (11-02-23 @ 11:40)  Fungitell: >500 pg/mL (10.17.23 @ 17:20) s/p empiric caspo   #Full thickness ulcer right heel- Appreciate burn/podiatry evaluation.  #History of Right planter foot ulcers - two full thickness ulcers - serous drainage with mild erythema with OM  - MR Foot No Cont, Right (10.16.23 @ 21:51): IMPRESSION: 1.  Limited exam. 2.  Osteomyelitis of the first metatarsal stump. 3.  Osteomyelitis of the second toe distal phalanx.  - s/p excidional debridement to and including bone 1st metatarsal with partial 2nd digit amputation - 1st metatarsal head resected - Wound Cx Proteus ESBL   #CKD 2-3 Creatinine: 0.7 mg/dL (02.02.24 @ 04:59)  #History of buttock ulcer  #Down syndrome/Cerebral Palsy     RECOMMENDATIONS  - midline 2/26, recommend removing as CONS and would shorten antibiotic duration  - Vanc dosing AUC/ZANE per clinical pharmacist x 14 days 3/28, or remove midline x 5 days from cleared BCX (past this point in time)  - fungitell borderline high 81, normal up to 80. repeat in 7 days. many reasons for false +, patient not at risk of invasive fungal infections  - Grave prognosis, aggressive care is futile    If any questions, please send a message or call on [employer] Teams  Please continue to update ID with any pertinent new laboratory or radiographic findings.       ASSESSMENT  58F w/ PMHx Down Syndrome, nonverbal at baseline, Hypothyroidism, Cerebral palsy and Seizure Disorders presents to the ED from nursing facility/group home presented to ED for respiratory distress and high grade fever.     IMPRESSION  #Bacteremia, Staphylococcus epidermidis, Methicillin resistant: Detec    3/15 BCX NG    3/13 BCX Staphylococcus epidermidis , has midline  #Hypoxemia    3/13 CT Chest Extensive patchy  left-sided pulmonary opacities, slightly decreased from   prior CT. Patchy right-sided opacities also demonstrated. Findings are   likely infectious in etiology. Recommend follow-up.    CXR 3/13 Bilateral interstitial densities may reflect vascular congestion.  #HAP / aspiration with cavitary PNA  < from: CT Chest w/ IV Cont (02.21.24 @ 00:09) >  Bilateral pneumonia, left worse than right, with a left upper lobe   thick-walled cavity. Diffuse opacification of the   left lung is seen with what appears to be some mucus plugging centrally   within the left mainstem bronchus. There is a pleural effusion.   Reactive mediastinal   lymph nodes are seen.    MRSA PCR Result.: Negative (02-20-24 @ 13:42)    Procalcitonin, Serum: 0.16 (02-19-24 @ 16:00)- unremarkable     2/17 BCX NGTD     Rapid RVP Result: NotDetec (02-17-24 @ 19:06)    2/12 BCX NGTD    Procalcitonin, Serum: 0.10 (02-12-24 @ 11:17)    Rapid RVP Result: NotDetec (02-12-24 @ 10:28)    MRSA PCR Result.: Negative (02-12-24 @ 10:28)    2/9 BCX NGTD x2    2/3 BCX NGTD     2/1 BCX NGTD     2/1 UCX   >=3 organisms. Probable collection contamination.    1/31 BCX NG    Rapid RVP Result: NotDetec (02-04-24 @ 10:28)    MRSA PCR Result.: Negative (02-03-24 @ 21:32)     UA without significant pyuria   Procalcitonin, Serum: 0.22 (02-01-24 @ 16:00)--> Procalcitonin, Serum: 0.15 (02-03-24 @ 11:13), downtrending ; unremarkable   MRSA PCR Result.: Negative (01-22-24 @ 05:30)  < from: Xray Chest 1 View-PORTABLE IMMEDIATE (Xray Chest 1 View-PORTABLE IMMEDIATE .) (02.01.24 @ 03:02) >  Bibasal opacities without significant change.    quantiferon gold negative  #Acute hypoxic respiratory failure- Gram Negative pneumonia   #1/20 BCX 1/4 bottles : Staphylococcus hominis- contaminant   Repeat CX NG   #Severe Sepsis on admission  #RSV + 1/21  #Elevated fungitell   serum aspergillus galactomannan and histo are (-), not at risk of PCP\  Fungitell: 81 (03-13-24 @ 17:24)  Fungitell: 76 (02-19-24 @ 16:00)  Fungitell: 63 (02-06-24 @ 11:27)  Fungitell: 325 (01-20-24 @ 21:23)  Fungitell: 138 (11-07-23 @ 08:08)  Fungitell: 115 (11-02-23 @ 11:40)  Fungitell: >500 pg/mL (10.17.23 @ 17:20) s/p empiric caspo   #Full thickness ulcer right heel- Appreciate burn/podiatry evaluation.  #History of Right planter foot ulcers - two full thickness ulcers - serous drainage with mild erythema with OM  - MR Foot No Cont, Right (10.16.23 @ 21:51): IMPRESSION: 1.  Limited exam. 2.  Osteomyelitis of the first metatarsal stump. 3.  Osteomyelitis of the second toe distal phalanx.  - s/p excidional debridement to and including bone 1st metatarsal with partial 2nd digit amputation - 1st metatarsal head resected - Wound Cx Proteus ESBL   #CKD 2-3 Creatinine: 0.7 mg/dL (02.02.24 @ 04:59)  #History of buttock ulcer  #Down syndrome/Cerebral Palsy     RECOMMENDATIONS  - Team confirmed midline removed  - monitor off antimicrobials   - fungitell borderline high 81, normal up to 80. repeat in 7 days. many reasons for false +, patient not at risk of invasive fungal infections  - Grave prognosis, aggressive care is futile    If any questions, please send a message or call on [facility] Teams  Please continue to update ID with any pertinent new laboratory or radiographic findings.

## 2024-03-22 NOTE — PROGRESS NOTE ADULT - CRITICAL CARE SERVICES PROVIDED
Patient is critically ill, requiring critical care services.

## 2024-03-22 NOTE — PROGRESS NOTE ADULT - SUBJECTIVE AND OBJECTIVE BOX
JERICHO TEA  58y, Female  Allergy: No Known Allergies      LOS  62d    CHIEF COMPLAINT: Respiratory Distress (22 Mar 2024 13:52)      INTERVAL EVENTS/HPI  - T(F): , Max: 96.6 (03-22-24 @ 08:34)  - WBC Count: 8.62 (03-22-24 @ 08:09)  WBC Count: 6.43 (03-21-24 @ 07:45)     - Creatinine: <0.5 (03-22-24 @ 08:09)  Creatinine: 0.5 (03-21-24 @ 07:45)     -   -   -     ROS  cannot obtain secondary to patient's sedation and/or mental status    VITALS:  T(F): 96.6, Max: 96.6 (03-22-24 @ 08:34)  HR: 70  BP: 90/54  RR: 18Vital Signs Last 24 Hrs  T(C): 35.9 (22 Mar 2024 08:34), Max: 35.9 (22 Mar 2024 08:34)  T(F): 96.6 (22 Mar 2024 08:34), Max: 96.6 (22 Mar 2024 08:34)  HR: 70 (22 Mar 2024 11:30) (62 - 103)  BP: 90/54 (22 Mar 2024 11:30) (89/48 - 120/66)  BP(mean): --  RR: 18 (22 Mar 2024 08:34) (18 - 18)  SpO2: --        PHYSICAL EXAM:  Gen: chronically ill appearing  NC  HEENT: Normocephalic, atraumatic  Neck: supple, no lymphadenopathy  CV: Regular rate & regular rhythm  Lungs: decreased BS at bases, no fremitus  Abdomen: Soft, BS present  Ext: Warm, well perfused  Neuro: non focal, not following commands  Skin: no rash, no erythema  Lines: no phlebitis     FH: Non-contributory  Social Hx: Non-contributory    TESTS & MEASUREMENTS:                        8.6    8.62  )-----------( 484      ( 22 Mar 2024 08:09 )             29.4     03-22    143  |  102  |  6<L>  ----------------------------<  109<H>  3.8   |  31  |  <0.5<L>    Ca    9.1      22 Mar 2024 08:09  Mg     1.9     03-22    TPro  6.2  /  Alb  3.1<L>  /  TBili  0.3  /  DBili  x   /  AST  12  /  ALT  9   /  AlkPhos  77  03-22      LIVER FUNCTIONS - ( 22 Mar 2024 08:09 )  Alb: 3.1 g/dL / Pro: 6.2 g/dL / ALK PHOS: 77 U/L / ALT: 9 U/L / AST: 12 U/L / GGT: x           Urinalysis Basic - ( 22 Mar 2024 08:09 )    Color: x / Appearance: x / SG: x / pH: x  Gluc: 109 mg/dL / Ketone: x  / Bili: x / Urobili: x   Blood: x / Protein: x / Nitrite: x   Leuk Esterase: x / RBC: x / WBC x   Sq Epi: x / Non Sq Epi: x / Bacteria: x        Culture - Blood (collected 03-15-24 @ 11:43)  Source: .Blood None  Final Report (03-20-24 @ 23:01):    No growth at 5 days    Culture - Blood (collected 03-13-24 @ 10:00)  Source: .Blood Blood  Gram Stain (03-15-24 @ 02:17):    Growth in anaerobic bottle: Gram Positive Cocci in Clusters  Final Report (03-15-24 @ 19:16):    Growth in anaerobic bottle: Staphylococcus epidermidis    Isolation of Coagulase negative Staphylococcus from single blood culture    sets may represent    contamination. Contact the Microbiology Department at [phone] if    susceptibility testing is    clinically indicated.    Direct identification is available within approximately 3-5    hours either by Blood Panel Multiplexed PCR or Direct    MALDI-TOF. Details: [email or web address]  Organism: Blood Culture PCR (03-15-24 @ 19:16)  Organism: Blood Culture PCR (03-15-24 @ 19:16)      Method Type: PCR      -  Staphylococcus epidermidis, Methicillin resistant: Detec    Culture - Blood (collected 03-08-24 @ 08:30)  Source: .Blood None  Final Report (03-13-24 @ 14:01):    No growth at 5 days    Culture - Blood (collected 03-07-24 @ 22:12)  Source: .Blood Blood-Peripheral  Final Report (03-13-24 @ 07:00):    No growth at 5 days    Culture - Blood (collected 02-25-24 @ 11:47)  Source: .Blood None  Final Report (03-01-24 @ 20:00):    No growth at 5 days            INFECTIOUS DISEASES TESTING  MRSA PCR Result.: Negative (03-16-24 @ 23:45)  Fungitell: 81 (03-13-24 @ 17:24)  Procalcitonin, Serum: 0.19 (03-13-24 @ 07:08)  Fungitell: 73 (02-23-24 @ 18:19)  MRSA PCR Result.: Negative (02-20-24 @ 13:42)  Fungitell: 76 (02-19-24 @ 16:00)  Procalcitonin, Serum: 0.16 (02-19-24 @ 16:00)  Procalcitonin, Serum: 0.20 (02-19-24 @ 11:23)  Rapid RVP Result: NotDetec (02-17-24 @ 19:06)  Procalcitonin, Serum: 0.11 (02-17-24 @ 10:41)  MRSA PCR Result.: Negative (02-15-24 @ 06:20)  Procalcitonin, Serum: 0.10 (02-12-24 @ 11:17)  Rapid RVP Result: NotDetec (02-12-24 @ 10:28)  MRSA PCR Result.: Negative (02-12-24 @ 10:28)  Fungitell: 63 (02-06-24 @ 11:27)  Fungitell: 65 (02-06-24 @ 04:43)  Rapid RVP Result: NotDetec (02-04-24 @ 10:28)  MRSA PCR Result.: Negative (02-03-24 @ 21:32)  Procalcitonin, Serum: 0.15 (02-03-24 @ 11:13)  Procalcitonin, Serum: 0.22 (02-01-24 @ 16:00)  MRSA PCR Result.: Negative (01-22-24 @ 05:30)  Legionella Antigen, Urine: Negative (01-21-24 @ 05:00)  Rapid RVP Result: Detected (01-21-24 @ 00:58)  Procalcitonin, Serum: 0.51 (01-20-24 @ 21:23)  Fungitell: 325 (01-20-24 @ 21:23)  Fungitell: 138 (11-07-23 @ 08:08)  Fungitell: 115 (11-02-23 @ 11:40)  Procalcitonin, Serum: 0.04 (11-02-23 @ 11:40)  Procalcitonin, Serum: 0.26 (10-24-23 @ 11:00)  MRSA PCR Result.: Negative (10-17-23 @ 17:20)  Fungitell: >500 (10-17-23 @ 17:20)  Procalcitonin, Serum: 4.36 (10-17-23 @ 17:20)  Procalcitonin, Serum: 4.18 (10-17-23 @ 12:35)  Procalcitonin, Serum: 0.07 (10-15-23 @ 07:28)  COVID-19 PCR: NotDetec (10-12-23 @ 09:14)  Procalcitonin, Serum: 0.40 (10-07-23 @ 10:54)  Legionella Antigen, Urine: Negative (09-30-23 @ 14:36)  MRSA PCR Result.: Negative (09-29-23 @ 16:50)  Procalcitonin, Serum: 9.78 (08-12-23 @ 11:25)  MRSA PCR Result.: Negative (08-12-23 @ 06:10)  Rapid RVP Result: NotDetec (08-12-23 @ 01:33)  Procalcitonin, Serum: 0.63 (08-10-23 @ 08:46)  Procalcitonin, Serum: 7.82 (08-04-23 @ 16:13)  MRSA PCR Result.: Negative (08-04-23 @ 14:52)  Rapid RVP Result: NotDetec (08-04-23 @ 03:00)      INFLAMMATORY MARKERS  Sedimentation Rate, Erythrocyte: 100 mm/Hr (02-03-24 @ 11:13)  C-Reactive Protein, Serum: 214.2 mg/L (02-03-24 @ 11:13)  C-Reactive Protein, Serum: 132.3 mg/L (02-01-24 @ 16:00)  Sedimentation Rate, Erythrocyte: 67 mm/Hr (10-15-23 @ 07:28)      RADIOLOGY & ADDITIONAL TESTS:  I have personally reviewed the last available Chest xray  CXR  Xray Chest 1 View- PORTABLE-Urgent:   ACC: [id number] EXAM:  XR CHEST PORTABLE URGENT 1V   ORDERED BY: ALBINO FLORIAN     PROCEDURE DATE:  03/21/2024          INTERPRETATION:  Clinical History / Reason for exam: Feeding tube   positioning    Comparison : Chest radiograph 3/19/24.    Technique/Positioning: Frontal portable.    Findings:    Support devices: Enteric catheter extends below the diaphragm    Cardiac/mediastinum/hilum: Unremarkable.    Lung parenchyma/Pleura: Unchanged left-sided opacities without   difference. No pneumothorax.  Skeleton/soft tissues: Unchanged    Impression:    Unchanged left-sided opacities without difference    --- End of Report ---            VARUN GAGE MD; Attending Radiologist  This document has been electronically signed. Mar 21 2024  2:49PM (03-21-24 @ 14:25)      CT      CARDIOLOGY TESTING  12 Lead ECG:   Ventricular Rate 135 BPM    Atrial Rate 135 BPM    P-R Interval 116 ms    QRS Duration 66 ms    Q-T Interval 294 ms    QTC Calculation(Bazett) 441 ms    P Axis 34 degrees    R Axis 49 degrees    T Axis 30 degrees    Diagnosis Line Sinus tachycardia  Possible Left atrial enlargement  Borderline ECG    Confirmed by MARJAN MENDES MD ([id number]) on 3/14/2024 6:37:25 AM (03-13-24 @ 23:14)  12 Lead ECG:   Ventricular Rate 107 BPM    Atrial Rate 107 BPM    P-R Interval 120 ms    QRS Duration 66 ms    Q-T Interval 322 ms    QTC Calculation(Bazett) 429 ms    P Axis 50 degrees    R Axis 90 degrees    T Axis 48 degrees    Diagnosis Line Sinus tachycardia  Rightward axis  Borderline ECG    Confirmed by caleb roberts ([id number]) on 3/13/2024 12:38:34 PM (03-13-24 @ 07:52)      MEDICATIONS  acetylcysteine 20%  Inhalation 4  albuterol/ipratropium for Nebulization 3  AQUAPHOR (petrolatum Ointment) 1  artificial  tears Solution 1  chlorhexidine 2% Cloths 1  enoxaparin Injectable 50  erythromycin   Ointment 1  gabapentin 300  levETIRAcetam  IVPB 500  melatonin 3  midodrine. 20  pantoprazole  Injectable 40  polyethylene glycol 3350 17  raloxifene 60  saccharomyces boulardii 250  senna 2  sodium chloride 0.65% Nasal 2  sodium chloride 0.9% Bolus 500      WEIGHT  Weight (kg): 52.3 (03-22-24 @ 02:34)  Creatinine: <0.5 mg/dL (03-22-24 @ 08:09)      ANTIBIOTICS:      All available historical records have been reviewed

## 2024-03-22 NOTE — PROGRESS NOTE ADULT - CRITICAL CARE ATTENDING COMMENT
I have personally seen and examined this patient.  I have reviewed all pertinent clinical information and reviewed all relevant imaging and diagnostic studies personally.   If possible, I counseled the patient about diagnostic testing and treatment plan.   I discussed my recommendations with the primary team and any family members at bedside.

## 2024-03-22 NOTE — PRE-OP CHECKLIST - SELECT TESTS ORDERED
BMP/CBC/PT/PTT/INR/Type and Cross/Type and Screen
BMP/CBC/CMP/PT/PTT/INR/Type and Cross/Type and Screen

## 2024-03-23 LAB
ALBUMIN SERPL ELPH-MCNC: 2.9 G/DL — LOW (ref 3.5–5.2)
ALP SERPL-CCNC: 85 U/L — SIGNIFICANT CHANGE UP (ref 30–115)
ALT FLD-CCNC: 8 U/L — SIGNIFICANT CHANGE UP (ref 0–41)
ANION GAP SERPL CALC-SCNC: 9 MMOL/L — SIGNIFICANT CHANGE UP (ref 7–14)
AST SERPL-CCNC: 10 U/L — SIGNIFICANT CHANGE UP (ref 0–41)
BASOPHILS # BLD AUTO: 0.01 K/UL — SIGNIFICANT CHANGE UP (ref 0–0.2)
BASOPHILS NFR BLD AUTO: 0.1 % — SIGNIFICANT CHANGE UP (ref 0–1)
BILIRUB SERPL-MCNC: 0.3 MG/DL — SIGNIFICANT CHANGE UP (ref 0.2–1.2)
BUN SERPL-MCNC: 11 MG/DL — SIGNIFICANT CHANGE UP (ref 10–20)
CALCIUM SERPL-MCNC: 8.8 MG/DL — SIGNIFICANT CHANGE UP (ref 8.4–10.5)
CHLORIDE SERPL-SCNC: 99 MMOL/L — SIGNIFICANT CHANGE UP (ref 98–110)
CO2 SERPL-SCNC: 30 MMOL/L — SIGNIFICANT CHANGE UP (ref 17–32)
CREAT SERPL-MCNC: <0.5 MG/DL — LOW (ref 0.7–1.5)
EGFR: 115 ML/MIN/1.73M2 — SIGNIFICANT CHANGE UP
EOSINOPHIL # BLD AUTO: 0.24 K/UL — SIGNIFICANT CHANGE UP (ref 0–0.7)
EOSINOPHIL NFR BLD AUTO: 2.2 % — SIGNIFICANT CHANGE UP (ref 0–8)
GLUCOSE BLDC GLUCOMTR-MCNC: 111 MG/DL — HIGH (ref 70–99)
GLUCOSE BLDC GLUCOMTR-MCNC: 117 MG/DL — HIGH (ref 70–99)
GLUCOSE BLDC GLUCOMTR-MCNC: 123 MG/DL — HIGH (ref 70–99)
GLUCOSE BLDC GLUCOMTR-MCNC: 127 MG/DL — HIGH (ref 70–99)
GLUCOSE BLDC GLUCOMTR-MCNC: 194 MG/DL — HIGH (ref 70–99)
GLUCOSE SERPL-MCNC: 113 MG/DL — HIGH (ref 70–99)
HCT VFR BLD CALC: 28.2 % — LOW (ref 37–47)
HGB BLD-MCNC: 8.3 G/DL — LOW (ref 12–16)
IMM GRANULOCYTES NFR BLD AUTO: 0.6 % — HIGH (ref 0.1–0.3)
LYMPHOCYTES # BLD AUTO: 2.9 K/UL — SIGNIFICANT CHANGE UP (ref 1.2–3.4)
LYMPHOCYTES # BLD AUTO: 27.2 % — SIGNIFICANT CHANGE UP (ref 20.5–51.1)
MAGNESIUM SERPL-MCNC: 2.1 MG/DL — SIGNIFICANT CHANGE UP (ref 1.8–2.4)
MCHC RBC-ENTMCNC: 23.1 PG — LOW (ref 27–31)
MCHC RBC-ENTMCNC: 29.4 G/DL — LOW (ref 32–37)
MCV RBC AUTO: 78.3 FL — LOW (ref 81–99)
MONOCYTES # BLD AUTO: 0.64 K/UL — HIGH (ref 0.1–0.6)
MONOCYTES NFR BLD AUTO: 6 % — SIGNIFICANT CHANGE UP (ref 1.7–9.3)
NEUTROPHILS # BLD AUTO: 6.83 K/UL — HIGH (ref 1.4–6.5)
NEUTROPHILS NFR BLD AUTO: 63.9 % — SIGNIFICANT CHANGE UP (ref 42.2–75.2)
NRBC # BLD: 0 /100 WBCS — SIGNIFICANT CHANGE UP (ref 0–0)
PLATELET # BLD AUTO: 482 K/UL — HIGH (ref 130–400)
PMV BLD: 9.7 FL — SIGNIFICANT CHANGE UP (ref 7.4–10.4)
POTASSIUM SERPL-MCNC: 3.9 MMOL/L — SIGNIFICANT CHANGE UP (ref 3.5–5)
POTASSIUM SERPL-SCNC: 3.9 MMOL/L — SIGNIFICANT CHANGE UP (ref 3.5–5)
PROT SERPL-MCNC: 5.9 G/DL — LOW (ref 6–8)
RBC # BLD: 3.6 M/UL — LOW (ref 4.2–5.4)
RBC # FLD: 20.8 % — HIGH (ref 11.5–14.5)
SODIUM SERPL-SCNC: 138 MMOL/L — SIGNIFICANT CHANGE UP (ref 135–146)
WBC # BLD: 10.68 K/UL — SIGNIFICANT CHANGE UP (ref 4.8–10.8)
WBC # FLD AUTO: 10.68 K/UL — SIGNIFICANT CHANGE UP (ref 4.8–10.8)

## 2024-03-23 PROCEDURE — 99231 SBSQ HOSP IP/OBS SF/LOW 25: CPT

## 2024-03-23 RX ADMIN — LEVETIRACETAM 400 MILLIGRAM(S): 250 TABLET, FILM COATED ORAL at 05:34

## 2024-03-23 RX ADMIN — Medication 1 APPLICATION(S): at 06:12

## 2024-03-23 RX ADMIN — MIDODRINE HYDROCHLORIDE 20 MILLIGRAM(S): 2.5 TABLET ORAL at 18:06

## 2024-03-23 RX ADMIN — Medication 2 SPRAY(S): at 13:21

## 2024-03-23 RX ADMIN — Medication 3 MILLILITER(S): at 13:56

## 2024-03-23 RX ADMIN — MIDODRINE HYDROCHLORIDE 20 MILLIGRAM(S): 2.5 TABLET ORAL at 12:12

## 2024-03-23 RX ADMIN — ENOXAPARIN SODIUM 50 MILLIGRAM(S): 100 INJECTION SUBCUTANEOUS at 09:41

## 2024-03-23 RX ADMIN — PANTOPRAZOLE SODIUM 40 MILLIGRAM(S): 20 TABLET, DELAYED RELEASE ORAL at 06:11

## 2024-03-23 RX ADMIN — GABAPENTIN 300 MILLIGRAM(S): 400 CAPSULE ORAL at 05:34

## 2024-03-23 RX ADMIN — SENNA PLUS 2 TABLET(S): 8.6 TABLET ORAL at 21:57

## 2024-03-23 RX ADMIN — Medication 1 DROP(S): at 18:06

## 2024-03-23 RX ADMIN — Medication 3 MILLILITER(S): at 08:31

## 2024-03-23 RX ADMIN — MIDODRINE HYDROCHLORIDE 20 MILLIGRAM(S): 2.5 TABLET ORAL at 05:33

## 2024-03-23 RX ADMIN — CHLORHEXIDINE GLUCONATE 1 APPLICATION(S): 213 SOLUTION TOPICAL at 12:12

## 2024-03-23 RX ADMIN — Medication 3 MILLIGRAM(S): at 21:57

## 2024-03-23 RX ADMIN — Medication 1 APPLICATION(S): at 12:12

## 2024-03-23 RX ADMIN — ENOXAPARIN SODIUM 50 MILLIGRAM(S): 100 INJECTION SUBCUTANEOUS at 21:56

## 2024-03-23 RX ADMIN — LEVETIRACETAM 400 MILLIGRAM(S): 250 TABLET, FILM COATED ORAL at 18:05

## 2024-03-23 RX ADMIN — Medication 4 MILLILITER(S): at 08:31

## 2024-03-23 RX ADMIN — Medication 3 MILLILITER(S): at 22:06

## 2024-03-23 RX ADMIN — Medication 4 MILLILITER(S): at 13:56

## 2024-03-23 RX ADMIN — GABAPENTIN 300 MILLIGRAM(S): 400 CAPSULE ORAL at 18:06

## 2024-03-23 RX ADMIN — POLYETHYLENE GLYCOL 3350 17 GRAM(S): 17 POWDER, FOR SOLUTION ORAL at 21:56

## 2024-03-23 RX ADMIN — Medication 2 SPRAY(S): at 21:57

## 2024-03-23 RX ADMIN — Medication 2 SPRAY(S): at 06:10

## 2024-03-23 RX ADMIN — RALOXIFENE HYDROCHLORIDE 60 MILLIGRAM(S): 60 TABLET, COATED ORAL at 12:13

## 2024-03-23 RX ADMIN — Medication 1 APPLICATION(S): at 18:17

## 2024-03-23 RX ADMIN — Medication 1 DROP(S): at 06:10

## 2024-03-23 NOTE — PROGRESS NOTE ADULT - ASSESSMENT
58F w/ PMHx Down Syndrome, nonverbal at baseline, Hypothyroidism, Cerebral palsy and Seizure Disorders presents to the ED from nursing facility/group home ([facility]) presented to ED for respiratory distress and high grade fever. Patient found to be septic on admission. Admitted to SDU for management of acute respiratory distress 2/2 CAP/Aspiration PNA (20 Jan 2024 18:22)  While pt was on the floor she developed dyspnea  consulted by pulmonary/critical care and was upgraded back to SDU.      A/P    # Sepsis POA   - Acute hypoxic resp failure - multifactorial RSV bronchiolitis on admission; then concern for possible gram negative pneumonia and suspected aspiration  - Staph epidermidis bacteremia in 3/14  - staph hominis on on presentation - possible contaminant  repeat cultures 3/15- NGTD  ID following- s/p danna and levaquin for 7 days till 3/20; and vanc 5 days from time of negative cultures-  - midline removed  fungitell -81; no further intervention  - changed IV steroids to PO- stop   - currently on nasal cannula 2L; -  wean down on oxygen as tolerated    # dysphagia- plan for PEG  hold lovenox prior to PEG  pending consent from advisory board- under review currently   office phone number is [phone]    # Seizure Disorder  - c/w  Keppra   - seizure precautions  - keep Mg above 2.0     # H/o lower ext DVT   - c/w therapeutic Lovenox, Eliquis on dc    #  Hypothyroidism  - TSH 0.51    # microcytic Anemia, anemia of chronic disease   - monitor H/H,  ferritin normal; transferring saturation <6%; possible iron deficiency    #  Elevated Troponin , type 2 MI/  Sinus tachycardia- c/w  metoprolol  - TTE 2/19 normal EF no DD or valve abnormalities    # Down Syndrome/  Cerebral Palsy/ functional quadriplegia   - supportive care  - prevent falls and aspiration   - failed speech and swallow, might  need  PEG ( see above)  - on Raloxifene     # multiple pressure ulcers- continue wound care per recommendation from wound care; d/w nursing staff    # hypotension- on midodrine; continue    Palliative care team is following ,  pt is  DNR/DNI ( approved by [facility]) , obtain consent  for PEG tube from [employer] Advisory Board [place] [employer]    Pending: PEG placement, consent and decision from advisory board regarding PEg placement; monitoring BP         58F w/ PMHx Down Syndrome, nonverbal at baseline, Hypothyroidism, Cerebral palsy and Seizure Disorders presents to the ED from nursing facility/group home ([facility] presented to ED for respiratory distress and high grade fever. Patient found to be septic on admission. Admitted to SDU for management of acute respiratory distress 2/2 CAP/Aspiration PNA (20 Jan 2024 18:22)  While pt was on the floor she developed dyspnea  consulted by pulmonary/critical care and was upgraded back to SDU.      A/P    # Sepsis POA   - Acute hypoxic resp failure - multifactorial RSV bronchiolitis on admission; then concern for possible gram negative pneumonia and suspected aspiration  - Staph epidermidis bacteremia in 3/14  - staph hominis on on presentation - possible contaminant  repeat cultures 3/15- NGTD  ID following- s/p danna and levaquin for 7 days till 3/20; and vanc 5 days from time of negative cultures-  - midline removed  fungitell -81; no further intervention  - changed IV steroids to PO- stop   - currently on nasal cannula 2L; -  wean down on oxygen as tolerated    # dysphagia- plan for PEG  hold lovenox prior to PEG  pending consent from advisory board- under review currently   office phone number is [phone]    # Seizure Disorder  - c/w  Keppra   - seizure precautions  - keep Mg above 2.0     # H/o lower ext DVT   - c/w therapeutic Lovenox, Eliquis on dc    #  Hypothyroidism  - TSH 0.51    # microcytic Anemia, anemia of chronic disease   - monitor H/H,  ferritin normal; transferring saturation <6%; possible iron deficiency    #  Elevated Troponin , type 2 MI/  Sinus tachycardia- c/w  metoprolol  - TTE 2/19 normal EF no DD or valve abnormalities    # Down Syndrome/  Cerebral Palsy/ functional quadriplegia   - supportive care  - prevent falls and aspiration   - failed speech and swallow, might  need  PEG ( see above)  - on Raloxifene     # multiple pressure ulcers- continue wound care per recommendation from wound care; d/w nursing staff    # hypotension- on midodrine; continue    Palliative care team is following ,  pt is  DNR/DNI ( approved by [facility]) , obtain consent  for PEG tube from [employer] [place] [employer]    Pending: PEG placement, consent and decision from advisory board regarding PEg placement; monitoring BP    Time spent 30 mnts     58F w/ PMHx Down Syndrome, nonverbal at baseline, Hypothyroidism, Cerebral palsy and Seizure Disorders presents to the ED from nursing facility/group home ([facility]) presented to ED for respiratory distress and high grade fever. Patient found to be septic on admission. Admitted to SDU for management of acute respiratory distress 2/2 CAP/Aspiration PNA (20 Jan 2024 18:22)  While pt was on the floor she developed dyspnea  consulted by pulmonary/critical care and was upgraded back to SDU.      A/P    # Sepsis POA   - Acute hypoxic resp failure - multifactorial RSV bronchiolitis on admission; then concern for possible gram negative pneumonia and suspected aspiration  - Staph epidermidis bacteremia in 3/14  - staph hominis on on presentation - possible contaminant  repeat cultures 3/15- NGTD  ID following- s/p danna and levaquin for 7 days till 3/20; and vanc 5 days from time of negative cultures-  - midline removed  fungitell -81; no further intervention  - changed IV steroids to PO- stop   - currently on nasal cannula 2L; -  wean down on oxygen as tolerated    # dysphagia- plan for PEG  hold lovenox prior to PEG  pending consent from advisory board- under review currently   office phone number is [phone]    # Seizure Disorder  - c/w  Keppra   - seizure precautions  - keep Mg above 2.0     # H/o lower ext DVT   - c/w therapeutic Lovenox, Eliquis on dc    #  Hypothyroidism  - TSH 0.51    # microcytic Anemia, anemia of chronic disease   - monitor H/H,  ferritin normal; transferring saturation <6%; possible iron deficiency    #  Elevated Troponin , type 2 MI/  Sinus tachycardia- c/w  metoprolol  - TTE 2/19 normal EF no DD or valve abnormalities    # h/o duodenal ulceration  continue PPI bid; change to daily after EGD/PEG if stable    # Down Syndrome/  Cerebral Palsy/ functional quadriplegia   - supportive care  - prevent falls and aspiration   - failed speech and swallow, might  need  PEG ( see above)  - on Raloxifene     # multiple pressure ulcers- continue wound care per recommendation from wound care; d/w nursing staff    # hypotension- on midodrine; continue    Palliative care team is following ,  pt is  DNR/DNI ( approved by [facility]) , obtain consent  for PEG tube from Consumer Advisory Board [place] [employer]    Pending: PEG placement, consent and decision from advisory board regarding PEg placement; monitoring BP    Time spent 30 mnts

## 2024-03-23 NOTE — PROGRESS NOTE ADULT - SUBJECTIVE AND OBJECTIVE BOX
MEDICATIONS  (STANDING):  acetylcysteine 20%  Inhalation 4 milliLiter(s) Inhalation every 6 hours  albuterol/ipratropium for Nebulization 3 milliLiter(s) Nebulizer every 6 hours  AQUAPHOR (petrolatum Ointment) 1 Application(s) Topical two times a day  artificial  tears Solution 1 Drop(s) Both EYES two times a day  chlorhexidine 2% Cloths 1 Application(s) Topical daily  enoxaparin Injectable 50 milliGRAM(s) SubCutaneous every 12 hours  erythromycin   Ointment 1 Application(s) Both EYES daily  gabapentin 300 milliGRAM(s) Oral every 12 hours  levETIRAcetam  IVPB 500 milliGRAM(s) IV Intermittent two times a day  melatonin 3 milliGRAM(s) Oral at bedtime  midodrine. 20 milliGRAM(s) Oral three times a day  pantoprazole  Injectable 40 milliGRAM(s) IV Push every 24 hours  polyethylene glycol 3350 17 Gram(s) Oral at bedtime  raloxifene 60 milliGRAM(s) Oral daily  saccharomyces boulardii 250 milliGRAM(s) Oral two times a day  senna 2 Tablet(s) Oral at bedtime  sodium chloride 0.65% Nasal 2 Spray(s) Both Nostrils three times a day  sodium chloride 0.9% Bolus 500 milliLiter(s) IV Bolus once    MEDICATIONS  (PRN):  acetaminophen     Tablet .. 650 milliGRAM(s) Oral every 8 hours PRN Temp greater or equal to 38.5C (101.3F)  aluminum hydroxide/magnesium hydroxide/simethicone Suspension 30 milliLiter(s) Oral every 4 hours PRN Dyspepsia  ondansetron Injectable 4 milliGRAM(s) IV Push every 8 hours PRN Nausea and/or Vomiting   HPI  Patient is a 58y old Female who presents with a chief complaint of Respiratory Distress (23 Mar 2024 09:34)    Currently admitted to medicine with the primary diagnosis of Sepsis with acute hypoxic respiratory failure       Today is hospital day 63d.     INTERVAL HPI / OVERNIGHT EVENTS:  Patient was seen and examined at bedside  no new changes reported  tolerating diet            PAST MEDICAL & SURGICAL HISTORY  Down syndrome    Osteoporosis    Mild anemia    Neuropathy    S/P debridement  of R hip on 3/2/21      ALLERGIES  No Known Allergies    MEDICATIONS  STANDING MEDICATIONS  acetylcysteine 20%  Inhalation 4 milliLiter(s) Inhalation every 6 hours  albuterol/ipratropium for Nebulization 3 milliLiter(s) Nebulizer every 6 hours  AQUAPHOR (petrolatum Ointment) 1 Application(s) Topical two times a day  artificial  tears Solution 1 Drop(s) Both EYES two times a day  chlorhexidine 2% Cloths 1 Application(s) Topical daily  enoxaparin Injectable 50 milliGRAM(s) SubCutaneous every 12 hours  erythromycin   Ointment 1 Application(s) Both EYES daily  gabapentin 300 milliGRAM(s) Oral every 12 hours  levETIRAcetam  IVPB 500 milliGRAM(s) IV Intermittent two times a day  melatonin 3 milliGRAM(s) Oral at bedtime  midodrine. 20 milliGRAM(s) Oral three times a day  pantoprazole  Injectable 40 milliGRAM(s) IV Push every 24 hours  polyethylene glycol 3350 17 Gram(s) Oral at bedtime  raloxifene 60 milliGRAM(s) Oral daily  saccharomyces boulardii 250 milliGRAM(s) Oral two times a day  senna 2 Tablet(s) Oral at bedtime  sodium chloride 0.65% Nasal 2 Spray(s) Both Nostrils three times a day  sodium chloride 0.9% Bolus 500 milliLiter(s) IV Bolus once    PRN MEDICATIONS  acetaminophen     Tablet .. 650 milliGRAM(s) Oral every 8 hours PRN  aluminum hydroxide/magnesium hydroxide/simethicone Suspension 30 milliLiter(s) Oral every 4 hours PRN  ondansetron Injectable 4 milliGRAM(s) IV Push every 8 hours PRN    VITALS:  T(F): 96  HR: 74  BP: 107/58  RR: 18  SpO2: --    PHYSICAL EXAM  GEN: awake, no distress  NG tube and O2 present  PULM: BS heard b/l equal, No wheezing  CVS: S1S2 present, no rubs or gallops  ABD: Soft, non-distended, no guarding; non-tender  NEURO: awake and alert    LABS                        8.3    10.68 )-----------( 482      ( 23 Mar 2024 09:33 )             28.2     03-23    138  |  99  |  11  ----------------------------<  113<H>  3.9   |  30  |  <0.5<L>    Ca    8.8      23 Mar 2024 09:33  Mg     2.1     03-23    TPro  5.9<L>  /  Alb  2.9<L>  /  TBili  0.3  /  DBili  x   /  AST  10  /  ALT  8   /  AlkPhos  85  03-23      Urinalysis Basic - ( 23 Mar 2024 09:33 )    Color: x / Appearance: x / SG: x / pH: x  Gluc: 113 mg/dL / Ketone: x  / Bili: x / Urobili: x   Blood: x / Protein: x / Nitrite: x   Leuk Esterase: x / RBC: x / WBC x   Sq Epi: x / Non Sq Epi: x / Bacteria: x                RADIOLOGY

## 2024-03-24 LAB
ALBUMIN SERPL ELPH-MCNC: 3 G/DL — LOW (ref 3.5–5.2)
ALP SERPL-CCNC: 86 U/L — SIGNIFICANT CHANGE UP (ref 30–115)
ALT FLD-CCNC: 8 U/L — SIGNIFICANT CHANGE UP (ref 0–41)
ANION GAP SERPL CALC-SCNC: 6 MMOL/L — LOW (ref 7–14)
AST SERPL-CCNC: 13 U/L — SIGNIFICANT CHANGE UP (ref 0–41)
BASOPHILS # BLD AUTO: 0.03 K/UL — SIGNIFICANT CHANGE UP (ref 0–0.2)
BASOPHILS NFR BLD AUTO: 0.4 % — SIGNIFICANT CHANGE UP (ref 0–1)
BILIRUB SERPL-MCNC: 0.3 MG/DL — SIGNIFICANT CHANGE UP (ref 0.2–1.2)
BUN SERPL-MCNC: 10 MG/DL — SIGNIFICANT CHANGE UP (ref 10–20)
CALCIUM SERPL-MCNC: 8.8 MG/DL — SIGNIFICANT CHANGE UP (ref 8.4–10.5)
CHLORIDE SERPL-SCNC: 97 MMOL/L — LOW (ref 98–110)
CO2 SERPL-SCNC: 31 MMOL/L — SIGNIFICANT CHANGE UP (ref 17–32)
CREAT SERPL-MCNC: 0.5 MG/DL — LOW (ref 0.7–1.5)
EGFR: 109 ML/MIN/1.73M2 — SIGNIFICANT CHANGE UP
EOSINOPHIL # BLD AUTO: 0.46 K/UL — SIGNIFICANT CHANGE UP (ref 0–0.7)
EOSINOPHIL NFR BLD AUTO: 5.9 % — SIGNIFICANT CHANGE UP (ref 0–8)
GLUCOSE BLDC GLUCOMTR-MCNC: 103 MG/DL — HIGH (ref 70–99)
GLUCOSE BLDC GLUCOMTR-MCNC: 113 MG/DL — HIGH (ref 70–99)
GLUCOSE BLDC GLUCOMTR-MCNC: 114 MG/DL — HIGH (ref 70–99)
GLUCOSE BLDC GLUCOMTR-MCNC: 134 MG/DL — HIGH (ref 70–99)
GLUCOSE SERPL-MCNC: 105 MG/DL — HIGH (ref 70–99)
HCT VFR BLD CALC: 28.6 % — LOW (ref 37–47)
HGB BLD-MCNC: 8.8 G/DL — LOW (ref 12–16)
IMM GRANULOCYTES NFR BLD AUTO: 0.5 % — HIGH (ref 0.1–0.3)
LYMPHOCYTES # BLD AUTO: 2.34 K/UL — SIGNIFICANT CHANGE UP (ref 1.2–3.4)
LYMPHOCYTES # BLD AUTO: 30 % — SIGNIFICANT CHANGE UP (ref 20.5–51.1)
MAGNESIUM SERPL-MCNC: 2.5 MG/DL — HIGH (ref 1.8–2.4)
MCHC RBC-ENTMCNC: 23.8 PG — LOW (ref 27–31)
MCHC RBC-ENTMCNC: 30.8 G/DL — LOW (ref 32–37)
MCV RBC AUTO: 77.3 FL — LOW (ref 81–99)
MONOCYTES # BLD AUTO: 0.36 K/UL — SIGNIFICANT CHANGE UP (ref 0.1–0.6)
MONOCYTES NFR BLD AUTO: 4.6 % — SIGNIFICANT CHANGE UP (ref 1.7–9.3)
NEUTROPHILS # BLD AUTO: 4.57 K/UL — SIGNIFICANT CHANGE UP (ref 1.4–6.5)
NEUTROPHILS NFR BLD AUTO: 58.6 % — SIGNIFICANT CHANGE UP (ref 42.2–75.2)
NRBC # BLD: 0 /100 WBCS — SIGNIFICANT CHANGE UP (ref 0–0)
PLATELET # BLD AUTO: 439 K/UL — HIGH (ref 130–400)
PMV BLD: 9.6 FL — SIGNIFICANT CHANGE UP (ref 7.4–10.4)
POTASSIUM SERPL-MCNC: 4.4 MMOL/L — SIGNIFICANT CHANGE UP (ref 3.5–5)
POTASSIUM SERPL-SCNC: 4.4 MMOL/L — SIGNIFICANT CHANGE UP (ref 3.5–5)
PROT SERPL-MCNC: 6.1 G/DL — SIGNIFICANT CHANGE UP (ref 6–8)
RBC # BLD: 3.7 M/UL — LOW (ref 4.2–5.4)
RBC # FLD: 20.4 % — HIGH (ref 11.5–14.5)
SODIUM SERPL-SCNC: 134 MMOL/L — LOW (ref 135–146)
WBC # BLD: 7.8 K/UL — SIGNIFICANT CHANGE UP (ref 4.8–10.8)
WBC # FLD AUTO: 7.8 K/UL — SIGNIFICANT CHANGE UP (ref 4.8–10.8)

## 2024-03-24 PROCEDURE — 99231 SBSQ HOSP IP/OBS SF/LOW 25: CPT

## 2024-03-24 RX ORDER — IRON SUCROSE 20 MG/ML
200 INJECTION, SOLUTION INTRAVENOUS EVERY 24 HOURS
Refills: 0 | Status: COMPLETED | OUTPATIENT
Start: 2024-03-24 | End: 2024-03-28

## 2024-03-24 RX ADMIN — CHLORHEXIDINE GLUCONATE 1 APPLICATION(S): 213 SOLUTION TOPICAL at 12:30

## 2024-03-24 RX ADMIN — POLYETHYLENE GLYCOL 3350 17 GRAM(S): 17 POWDER, FOR SOLUTION ORAL at 21:43

## 2024-03-24 RX ADMIN — Medication 1 APPLICATION(S): at 18:09

## 2024-03-24 RX ADMIN — GABAPENTIN 300 MILLIGRAM(S): 400 CAPSULE ORAL at 06:11

## 2024-03-24 RX ADMIN — ENOXAPARIN SODIUM 50 MILLIGRAM(S): 100 INJECTION SUBCUTANEOUS at 21:43

## 2024-03-24 RX ADMIN — Medication 1 DROP(S): at 18:04

## 2024-03-24 RX ADMIN — Medication 3 MILLILITER(S): at 20:37

## 2024-03-24 RX ADMIN — PANTOPRAZOLE SODIUM 40 MILLIGRAM(S): 20 TABLET, DELAYED RELEASE ORAL at 06:11

## 2024-03-24 RX ADMIN — Medication 1 DROP(S): at 06:12

## 2024-03-24 RX ADMIN — LEVETIRACETAM 400 MILLIGRAM(S): 250 TABLET, FILM COATED ORAL at 18:04

## 2024-03-24 RX ADMIN — SENNA PLUS 2 TABLET(S): 8.6 TABLET ORAL at 21:44

## 2024-03-24 RX ADMIN — MIDODRINE HYDROCHLORIDE 20 MILLIGRAM(S): 2.5 TABLET ORAL at 12:28

## 2024-03-24 RX ADMIN — Medication 3 MILLILITER(S): at 13:05

## 2024-03-24 RX ADMIN — Medication 2 SPRAY(S): at 06:12

## 2024-03-24 RX ADMIN — Medication 2 SPRAY(S): at 12:48

## 2024-03-24 RX ADMIN — IRON SUCROSE 110 MILLIGRAM(S): 20 INJECTION, SOLUTION INTRAVENOUS at 12:29

## 2024-03-24 RX ADMIN — GABAPENTIN 300 MILLIGRAM(S): 400 CAPSULE ORAL at 18:04

## 2024-03-24 RX ADMIN — ENOXAPARIN SODIUM 50 MILLIGRAM(S): 100 INJECTION SUBCUTANEOUS at 09:26

## 2024-03-24 RX ADMIN — LEVETIRACETAM 400 MILLIGRAM(S): 250 TABLET, FILM COATED ORAL at 06:12

## 2024-03-24 RX ADMIN — Medication 3 MILLILITER(S): at 08:50

## 2024-03-24 RX ADMIN — Medication 1 APPLICATION(S): at 06:18

## 2024-03-24 RX ADMIN — Medication 2 SPRAY(S): at 21:51

## 2024-03-24 RX ADMIN — RALOXIFENE HYDROCHLORIDE 60 MILLIGRAM(S): 60 TABLET, COATED ORAL at 12:28

## 2024-03-24 RX ADMIN — Medication 4 MILLILITER(S): at 20:37

## 2024-03-24 RX ADMIN — Medication 4 MILLILITER(S): at 13:05

## 2024-03-24 RX ADMIN — Medication 4 MILLILITER(S): at 08:30

## 2024-03-24 RX ADMIN — Medication 1 APPLICATION(S): at 12:28

## 2024-03-24 RX ADMIN — Medication 3 MILLIGRAM(S): at 21:48

## 2024-03-24 NOTE — PROGRESS NOTE ADULT - SUBJECTIVE AND OBJECTIVE BOX
MEDICATIONS  (STANDING):  acetylcysteine 20%  Inhalation 4 milliLiter(s) Inhalation every 6 hours  albuterol/ipratropium for Nebulization 3 milliLiter(s) Nebulizer every 6 hours  AQUAPHOR (petrolatum Ointment) 1 Application(s) Topical two times a day  artificial  tears Solution 1 Drop(s) Both EYES two times a day  chlorhexidine 2% Cloths 1 Application(s) Topical daily  enoxaparin Injectable 50 milliGRAM(s) SubCutaneous every 12 hours  erythromycin   Ointment 1 Application(s) Both EYES daily  gabapentin 300 milliGRAM(s) Oral every 12 hours  levETIRAcetam  IVPB 500 milliGRAM(s) IV Intermittent two times a day  melatonin 3 milliGRAM(s) Oral at bedtime  midodrine. 20 milliGRAM(s) Oral three times a day  pantoprazole  Injectable 40 milliGRAM(s) IV Push every 24 hours  polyethylene glycol 3350 17 Gram(s) Oral at bedtime  raloxifene 60 milliGRAM(s) Oral daily  saccharomyces boulardii 250 milliGRAM(s) Oral two times a day  senna 2 Tablet(s) Oral at bedtime  sodium chloride 0.65% Nasal 2 Spray(s) Both Nostrils three times a day  sodium chloride 0.9% Bolus 500 milliLiter(s) IV Bolus once    MEDICATIONS  (PRN):  acetaminophen     Tablet .. 650 milliGRAM(s) Oral every 8 hours PRN Temp greater or equal to 38.5C (101.3F)  aluminum hydroxide/magnesium hydroxide/simethicone Suspension 30 milliLiter(s) Oral every 4 hours PRN Dyspepsia  ondansetron Injectable 4 milliGRAM(s) IV Push every 8 hours PRN Nausea and/or Vomiting   HPI  Patient is a 58y old Female who presents with a chief complaint of Respiratory Distress (24 Mar 2024 09:02)    Currently admitted to medicine with the primary diagnosis of Sepsis with acute hypoxic respiratory failure       Today is hospital day 64d.     INTERVAL HPI / OVERNIGHT EVENTS:  Patient was seen and examined at bedside  no new changes  tolerating tube feeds      PAST MEDICAL & SURGICAL HISTORY  Down syndrome    Osteoporosis    Mild anemia    Neuropathy    S/P debridement  of R hip on 3/2/21      ALLERGIES  No Known Allergies    MEDICATIONS  STANDING MEDICATIONS  acetylcysteine 20%  Inhalation 4 milliLiter(s) Inhalation every 6 hours  albuterol/ipratropium for Nebulization 3 milliLiter(s) Nebulizer every 6 hours  AQUAPHOR (petrolatum Ointment) 1 Application(s) Topical two times a day  artificial  tears Solution 1 Drop(s) Both EYES two times a day  chlorhexidine 2% Cloths 1 Application(s) Topical daily  enoxaparin Injectable 50 milliGRAM(s) SubCutaneous every 12 hours  erythromycin   Ointment 1 Application(s) Both EYES daily  gabapentin 300 milliGRAM(s) Oral every 12 hours  iron sucrose IVPB 200 milliGRAM(s) IV Intermittent every 24 hours  levETIRAcetam  IVPB 500 milliGRAM(s) IV Intermittent two times a day  melatonin 3 milliGRAM(s) Oral at bedtime  midodrine. 20 milliGRAM(s) Oral three times a day  pantoprazole  Injectable 40 milliGRAM(s) IV Push every 24 hours  polyethylene glycol 3350 17 Gram(s) Oral at bedtime  raloxifene 60 milliGRAM(s) Oral daily  saccharomyces boulardii 250 milliGRAM(s) Oral two times a day  senna 2 Tablet(s) Oral at bedtime  sodium chloride 0.65% Nasal 2 Spray(s) Both Nostrils three times a day    PRN MEDICATIONS  acetaminophen     Tablet .. 650 milliGRAM(s) Oral every 8 hours PRN  aluminum hydroxide/magnesium hydroxide/simethicone Suspension 30 milliLiter(s) Oral every 4 hours PRN  ondansetron Injectable 4 milliGRAM(s) IV Push every 8 hours PRN    VITALS:  T(F): 96.8  HR: 69  BP: 117/59  RR: 18  SpO2: --    PHYSICAL EXAM  GEN: no distress, comfortable  PULM: normal resp  EXT: No lower extremity edema  NEURO: Awake    LABS                        8.8    7.80  )-----------( 439      ( 24 Mar 2024 08:41 )             28.6     03-24    134<L>  |  97<L>  |  10  ----------------------------<  105<H>  4.4   |  31  |  0.5<L>    Ca    8.8      24 Mar 2024 08:41  Mg     2.5     03-24    TPro  6.1  /  Alb  3.0<L>  /  TBili  0.3  /  DBili  x   /  AST  13  /  ALT  8   /  AlkPhos  86  03-24      Urinalysis Basic - ( 24 Mar 2024 08:41 )    Color: x / Appearance: x / SG: x / pH: x  Gluc: 105 mg/dL / Ketone: x  / Bili: x / Urobili: x   Blood: x / Protein: x / Nitrite: x   Leuk Esterase: x / RBC: x / WBC x   Sq Epi: x / Non Sq Epi: x / Bacteria: x                RADIOLOGY

## 2024-03-24 NOTE — PROGRESS NOTE ADULT - ASSESSMENT
58F w/ PMHx Down Syndrome, nonverbal at baseline, Hypothyroidism, Cerebral palsy and Seizure Disorders presents to the ED from nursing facility/group home ([facility]) presented to ED for respiratory distress and high grade fever. Patient found to be septic on admission. Admitted to SDU for management of acute respiratory distress 2/2 CAP/Aspiration PNA (20 Jan 2024 18:22)  While pt was on the floor she developed dyspnea  consulted by pulmonary/critical care and was upgraded back to SDU.      A/P    # Sepsis POA - improved  - Acute hypoxic resp failure - multifactorial RSV bronchiolitis on admission; then concern for possible gram negative pneumonia and suspected aspiration  - Staph epidermidis bacteremia in 3/14  - staph hominis on on presentation - possible contaminant  repeat cultures 3/15- NGTD  ID following- s/p danna and levaquin for 7 days till 3/20; and vanc 5 days from time of negative cultures- completed  - midline removed  fungitell -81; no further intervention; recheck level iin 1 week  - s/p steroid course  - currently on nasal cannula 2L; -  wean down on oxygen as tolerated    # dysphagia- plan for PEG  hold lovenox prior to PEG  pending consent from advisory board- under review currently   office phone number is [phone]    # Seizure Disorder  - c/w  Keppra   - seizure precautions  - keep Mg above 2.0     # H/o lower ext DVT   - c/w therapeutic Lovenox, Eliquis on dc    #  Hypothyroidism  - TSH 0.51    # microcytic Anemia, anemia of chronic disease   - monitor H/H,  ferritin- 281, transferring saturation <6%; possible iron deficiency- start on venofer    #  Elevated Troponin , type 2 MI/  Sinus tachycardia- c/w  metoprolol  - TTE 2/19 normal EF no DD or valve abnormalities    # h/o duodenal ulceration  continue PPI bid; change to daily after EGD/PEG if stable    # Down Syndrome/  Cerebral Palsy/ functional quadriplegia   - supportive care  - prevent falls and aspiration   - failed speech and swallow, might  need  PEG ( see above)  - on Raloxifene     # multiple pressure ulcers- continue wound care per recommendation from wound care; d/w nursing staff    # hypotension- on midodrine; continue    Palliative care team is following ,  pt is  DNR/DNI ( approved by [facility]) , consent  for PEG tube from [employer] [place] [employer] pending    Pending: PEG placement, consent and decision from advisory board regarding PEg placement         58F w/ PMHx Down Syndrome, nonverbal at baseline, Hypothyroidism, Cerebral palsy and Seizure Disorders presents to the ED from nursing facility/group home ([facility]) presented to ED for respiratory distress and high grade fever. Patient found to be septic on admission. Admitted to SDU for management of acute respiratory distress 2/2 CAP/Aspiration PNA (20 Jan 2024 18:22)  While pt was on the floor she developed dyspnea  consulted by pulmonary/critical care and was upgraded back to SDU.    A/P    # Sepsis POA - improved  - Acute hypoxic resp failure - multifactorial RSV bronchiolitis on admission; then concern for possible gram negative pneumonia and suspected aspiration  - Staph epidermidis bacteremia in 3/14  - staph hominis on on presentation - possible contaminant  repeat cultures 3/15- NGTD  ID following- s/p danna and levaquin for 7 days till 3/20; and vanc 5 days from time of negative cultures- completed  - midline removed  fungitell -81; no further intervention; recheck level iin 1 week  - s/p steroid course  - currently on nasal cannula 2L; -  wean down on oxygen as tolerated    # dysphagia- plan for PEG  hold lovenox prior to PEG  pending consent from advisory board- under review currently   office phone number is [phone]    # Seizure Disorder  - c/w  Keppra   - seizure precautions  - keep Mg above 2.0     # H/o lower ext DVT   - c/w therapeutic Lovenox, Eliquis on dc    #  Hypothyroidism  - TSH 0.51    # microcytic Anemia, anemia of chronic disease   - monitor H/H,  ferritin- 281, transferring saturation <6%; possible iron deficiency- start on venofer    #  Elevated Troponin , type 2 MI/  Sinus tachycardia- c/w  metoprolol  - TTE 2/19 normal EF no DD or valve abnormalities    # h/o duodenal ulceration  continue PPI bid; change to daily after EGD/PEG if stable    # Down Syndrome/  Cerebral Palsy/ functional quadriplegia   - supportive care  - prevent falls and aspiration   - failed speech and swallow, might  need  PEG ( see above)  - on Raloxifene     # multiple pressure ulcers- continue wound care per recommendation from wound care; d/w nursing staff    # hypotension- on midodrine; continue    Palliative care team is following ,  pt is  DNR/DNI ( approved by [facility]) , consent  for PEG tube from [employer] [place] [employer] pending    Pending: PEG placement, consent and decision from advisory board regarding PEg placement

## 2024-03-25 LAB
ALBUMIN SERPL ELPH-MCNC: 3.2 G/DL — LOW (ref 3.5–5.2)
ALP SERPL-CCNC: 97 U/L — SIGNIFICANT CHANGE UP (ref 30–115)
ALT FLD-CCNC: 9 U/L — SIGNIFICANT CHANGE UP (ref 0–41)
ANION GAP SERPL CALC-SCNC: 12 MMOL/L — SIGNIFICANT CHANGE UP (ref 7–14)
AST SERPL-CCNC: 14 U/L — SIGNIFICANT CHANGE UP (ref 0–41)
BASOPHILS # BLD AUTO: 0.04 K/UL — SIGNIFICANT CHANGE UP (ref 0–0.2)
BASOPHILS NFR BLD AUTO: 0.6 % — SIGNIFICANT CHANGE UP (ref 0–1)
BILIRUB SERPL-MCNC: 0.4 MG/DL — SIGNIFICANT CHANGE UP (ref 0.2–1.2)
BUN SERPL-MCNC: 7 MG/DL — LOW (ref 10–20)
CALCIUM SERPL-MCNC: 9 MG/DL — SIGNIFICANT CHANGE UP (ref 8.4–10.5)
CHLORIDE SERPL-SCNC: 100 MMOL/L — SIGNIFICANT CHANGE UP (ref 98–110)
CO2 SERPL-SCNC: 28 MMOL/L — SIGNIFICANT CHANGE UP (ref 17–32)
CREAT SERPL-MCNC: 0.5 MG/DL — LOW (ref 0.7–1.5)
EGFR: 109 ML/MIN/1.73M2 — SIGNIFICANT CHANGE UP
EOSINOPHIL # BLD AUTO: 0.25 K/UL — SIGNIFICANT CHANGE UP (ref 0–0.7)
EOSINOPHIL NFR BLD AUTO: 3.8 % — SIGNIFICANT CHANGE UP (ref 0–8)
GLUCOSE BLDC GLUCOMTR-MCNC: 112 MG/DL — HIGH (ref 70–99)
GLUCOSE BLDC GLUCOMTR-MCNC: 121 MG/DL — HIGH (ref 70–99)
GLUCOSE BLDC GLUCOMTR-MCNC: 145 MG/DL — HIGH (ref 70–99)
GLUCOSE BLDC GLUCOMTR-MCNC: 149 MG/DL — HIGH (ref 70–99)
GLUCOSE SERPL-MCNC: 128 MG/DL — HIGH (ref 70–99)
HCT VFR BLD CALC: 29.8 % — LOW (ref 37–47)
HGB BLD-MCNC: 9.1 G/DL — LOW (ref 12–16)
IMM GRANULOCYTES NFR BLD AUTO: 0.8 % — HIGH (ref 0.1–0.3)
LYMPHOCYTES # BLD AUTO: 1.44 K/UL — SIGNIFICANT CHANGE UP (ref 1.2–3.4)
LYMPHOCYTES # BLD AUTO: 21.9 % — SIGNIFICANT CHANGE UP (ref 20.5–51.1)
MAGNESIUM SERPL-MCNC: 2.4 MG/DL — SIGNIFICANT CHANGE UP (ref 1.8–2.4)
MCHC RBC-ENTMCNC: 23.7 PG — LOW (ref 27–31)
MCHC RBC-ENTMCNC: 30.5 G/DL — LOW (ref 32–37)
MCV RBC AUTO: 77.6 FL — LOW (ref 81–99)
MONOCYTES # BLD AUTO: 0.26 K/UL — SIGNIFICANT CHANGE UP (ref 0.1–0.6)
MONOCYTES NFR BLD AUTO: 3.9 % — SIGNIFICANT CHANGE UP (ref 1.7–9.3)
NEUTROPHILS # BLD AUTO: 4.55 K/UL — SIGNIFICANT CHANGE UP (ref 1.4–6.5)
NEUTROPHILS NFR BLD AUTO: 69 % — SIGNIFICANT CHANGE UP (ref 42.2–75.2)
NRBC # BLD: 0 /100 WBCS — SIGNIFICANT CHANGE UP (ref 0–0)
PLATELET # BLD AUTO: 455 K/UL — HIGH (ref 130–400)
PMV BLD: 10.2 FL — SIGNIFICANT CHANGE UP (ref 7.4–10.4)
POTASSIUM SERPL-MCNC: 4.5 MMOL/L — SIGNIFICANT CHANGE UP (ref 3.5–5)
POTASSIUM SERPL-SCNC: 4.5 MMOL/L — SIGNIFICANT CHANGE UP (ref 3.5–5)
PROT SERPL-MCNC: 6.5 G/DL — SIGNIFICANT CHANGE UP (ref 6–8)
RBC # BLD: 3.84 M/UL — LOW (ref 4.2–5.4)
RBC # FLD: 20.6 % — HIGH (ref 11.5–14.5)
SODIUM SERPL-SCNC: 140 MMOL/L — SIGNIFICANT CHANGE UP (ref 135–146)
WBC # BLD: 6.59 K/UL — SIGNIFICANT CHANGE UP (ref 4.8–10.8)
WBC # FLD AUTO: 6.59 K/UL — SIGNIFICANT CHANGE UP (ref 4.8–10.8)

## 2024-03-25 PROCEDURE — 99232 SBSQ HOSP IP/OBS MODERATE 35: CPT | Mod: GC

## 2024-03-25 RX ORDER — ALBUTEROL 90 UG/1
2.5 AEROSOL, METERED ORAL EVERY 6 HOURS
Refills: 0 | Status: DISCONTINUED | OUTPATIENT
Start: 2024-03-25 | End: 2024-04-08

## 2024-03-25 RX ORDER — ALBUTEROL 90 UG/1
1 AEROSOL, METERED ORAL EVERY 4 HOURS
Refills: 0 | Status: DISCONTINUED | OUTPATIENT
Start: 2024-03-25 | End: 2024-04-08

## 2024-03-25 RX ADMIN — MIDODRINE HYDROCHLORIDE 20 MILLIGRAM(S): 2.5 TABLET ORAL at 12:13

## 2024-03-25 RX ADMIN — Medication 4 MILLILITER(S): at 08:01

## 2024-03-25 RX ADMIN — Medication 250 MILLIGRAM(S): at 17:26

## 2024-03-25 RX ADMIN — ENOXAPARIN SODIUM 50 MILLIGRAM(S): 100 INJECTION SUBCUTANEOUS at 22:21

## 2024-03-25 RX ADMIN — POLYETHYLENE GLYCOL 3350 17 GRAM(S): 17 POWDER, FOR SOLUTION ORAL at 22:22

## 2024-03-25 RX ADMIN — Medication 3 MILLIGRAM(S): at 22:26

## 2024-03-25 RX ADMIN — Medication 4 MILLILITER(S): at 20:14

## 2024-03-25 RX ADMIN — PANTOPRAZOLE SODIUM 40 MILLIGRAM(S): 20 TABLET, DELAYED RELEASE ORAL at 05:29

## 2024-03-25 RX ADMIN — IRON SUCROSE 110 MILLIGRAM(S): 20 INJECTION, SOLUTION INTRAVENOUS at 12:12

## 2024-03-25 RX ADMIN — MIDODRINE HYDROCHLORIDE 20 MILLIGRAM(S): 2.5 TABLET ORAL at 17:26

## 2024-03-25 RX ADMIN — Medication 1 APPLICATION(S): at 05:39

## 2024-03-25 RX ADMIN — Medication 2 SPRAY(S): at 05:28

## 2024-03-25 RX ADMIN — Medication 1 DROP(S): at 17:27

## 2024-03-25 RX ADMIN — Medication 2 SPRAY(S): at 22:21

## 2024-03-25 RX ADMIN — ALBUTEROL 2.5 MILLIGRAM(S): 90 AEROSOL, METERED ORAL at 15:09

## 2024-03-25 RX ADMIN — Medication 3 MILLILITER(S): at 08:01

## 2024-03-25 RX ADMIN — Medication 1 DROP(S): at 05:38

## 2024-03-25 RX ADMIN — GABAPENTIN 300 MILLIGRAM(S): 400 CAPSULE ORAL at 05:29

## 2024-03-25 RX ADMIN — Medication 4 MILLILITER(S): at 15:10

## 2024-03-25 RX ADMIN — ALBUTEROL 2.5 MILLIGRAM(S): 90 AEROSOL, METERED ORAL at 20:14

## 2024-03-25 RX ADMIN — SENNA PLUS 2 TABLET(S): 8.6 TABLET ORAL at 22:22

## 2024-03-25 RX ADMIN — Medication 1 APPLICATION(S): at 17:27

## 2024-03-25 RX ADMIN — LEVETIRACETAM 400 MILLIGRAM(S): 250 TABLET, FILM COATED ORAL at 05:29

## 2024-03-25 RX ADMIN — Medication 1 APPLICATION(S): at 12:13

## 2024-03-25 RX ADMIN — GABAPENTIN 300 MILLIGRAM(S): 400 CAPSULE ORAL at 17:26

## 2024-03-25 RX ADMIN — LEVETIRACETAM 400 MILLIGRAM(S): 250 TABLET, FILM COATED ORAL at 17:26

## 2024-03-25 RX ADMIN — CHLORHEXIDINE GLUCONATE 1 APPLICATION(S): 213 SOLUTION TOPICAL at 12:13

## 2024-03-25 RX ADMIN — RALOXIFENE HYDROCHLORIDE 60 MILLIGRAM(S): 60 TABLET, COATED ORAL at 12:13

## 2024-03-25 RX ADMIN — ENOXAPARIN SODIUM 50 MILLIGRAM(S): 100 INJECTION SUBCUTANEOUS at 12:12

## 2024-03-25 RX ADMIN — Medication 2 SPRAY(S): at 17:25

## 2024-03-25 NOTE — PROGRESS NOTE ADULT - SUBJECTIVE AND OBJECTIVE BOX
24H events:    Patient is a 58y old Female who presents with a chief complaint of Respiratory Distress (24 Mar 2024 09:02)    Primary diagnosis of Sepsis with acute hypoxic respiratory failure    Today is hospital day 65d. This morning patient was seen and examined at bedside, resting comfortably in bed.    No acute or major events overnight.    PAST MEDICAL & SURGICAL HISTORY  Down syndrome    Osteoporosis    Mild anemia    Neuropathy    S/P debridement  of R hip on 3/2/21      SOCIAL HISTORY:  Social History:      ALLERGIES:  No Known Allergies    MEDICATIONS:  STANDING MEDICATIONS  acetylcysteine 20%  Inhalation 4 milliLiter(s) Inhalation every 6 hours  albuterol    0.083% 2.5 milliGRAM(s) Nebulizer every 6 hours  albuterol    90 MICROgram(s) HFA Inhaler 1 Puff(s) Inhalation every 4 hours  AQUAPHOR (petrolatum Ointment) 1 Application(s) Topical two times a day  artificial  tears Solution 1 Drop(s) Both EYES two times a day  chlorhexidine 2% Cloths 1 Application(s) Topical daily  enoxaparin Injectable 50 milliGRAM(s) SubCutaneous every 12 hours  erythromycin   Ointment 1 Application(s) Both EYES daily  gabapentin 300 milliGRAM(s) Oral every 12 hours  iron sucrose IVPB 200 milliGRAM(s) IV Intermittent every 24 hours  levETIRAcetam  IVPB 500 milliGRAM(s) IV Intermittent two times a day  melatonin 3 milliGRAM(s) Oral at bedtime  midodrine. 20 milliGRAM(s) Oral three times a day  pantoprazole  Injectable 40 milliGRAM(s) IV Push every 24 hours  polyethylene glycol 3350 17 Gram(s) Oral at bedtime  raloxifene 60 milliGRAM(s) Oral daily  saccharomyces boulardii 250 milliGRAM(s) Oral two times a day  senna 2 Tablet(s) Oral at bedtime  sodium chloride 0.65% Nasal 2 Spray(s) Both Nostrils three times a day    PRN MEDICATIONS  acetaminophen     Tablet .. 650 milliGRAM(s) Oral every 8 hours PRN  aluminum hydroxide/magnesium hydroxide/simethicone Suspension 30 milliLiter(s) Oral every 4 hours PRN  ondansetron Injectable 4 milliGRAM(s) IV Push every 8 hours PRN    VITALS:   T(F): 97.4  HR: 81  BP: 146/63  RR: 18  SpO2: 95%    PHYSICAL EXAM:  GENERAL:   (  ) NAD, lying in bed comfortably     (  ) obtunded     (  ) lethargic     (  ) somnolent    HEAD:   (  ) Atraumatic     (  ) hematoma     (  ) laceration (specify location:       )     NECK:  (  ) Supple     (  ) neck stiffness     (  ) nuchal rigidity     (  )  no JVD     (  ) JVD present ( -- cm)    HEART:  Rate -->     (  ) normal rate     (  ) bradycardic     (  ) tachycardic  Rhythm -->     (  ) regular     (  ) regularly irregular     (  ) irregularly irregular  Murmurs -->     (  ) normal s1s2     (  ) systolic murmur     (  ) diastolic murmur     (  ) continuous murmur      (  ) S3 present     (  ) S4 present    LUNGS:   (  )Unlabored respirations     (  ) tachypnea  (  ) B/L air entry     (  ) decreased breath sounds in:  (location     )    (  ) no adventitious sound     (  ) crackles     (  ) wheezing      (  ) rhonchi      (specify location:       )  (  ) chest wall tenderness (specify location:       )    ABDOMEN:   (  ) Soft     (  ) tense   |   (  ) nondistended     (  ) distended   |   (  ) +BS     (  ) hypoactive bowel sounds     (  ) hyperactive bowel sounds  (  ) nontender     (  ) RUQ tenderness     (  ) RLQ tenderness     (  ) LLQ tenderness     (  ) epigastric tenderness     (  ) diffuse tenderness  (  ) Splenomegaly      (  ) Hepatomegaly      (  ) Jaundice     (  ) ecchymosis     EXTREMITIES:  (  ) Normal     (  ) Rash     (  ) ecchymosis     (  ) varicose veins      (  ) pitting edema     (  ) non-pitting edema   (  ) ulceration     (  ) gangrene:     (location:     )    NERVOUS SYSTEM:    (  ) A&Ox3     (  ) confused     (  ) lethargic  CN II-XII:     (  ) Intact     (  ) deficits found     (Specify:     )   Upper extremities:     (  ) no sensorimotor deficits     (  ) weakness     (  ) loss of proprioception/vibration     (  ) loss of touch/temperature (specify:    )  Lower extremities:     (  ) no sensorimotor deficits     (  ) weakness     (  ) loss of proprioception/vibration     (  ) loss of touch/temperature (specify:    )    SKIN:   (  ) No rashes or lesions     (  ) maculopapular rash     (  ) pustules     (  ) vesicles     (  ) ulcer     (  ) ecchymosis     (specify location:     )    LABS:                        8.8    7.80  )-----------( 439      ( 24 Mar 2024 08:41 )             28.6     03-24    134<L>  |  97<L>  |  10  ----------------------------<  105<H>  4.4   |  31  |  0.5<L>    Ca    8.8      24 Mar 2024 08:41  Mg     2.5     03-24    TPro  6.1  /  Alb  3.0<L>  /  TBili  0.3  /  DBili  x   /  AST  13  /  ALT  8   /  AlkPhos  86  03-24      Urinalysis Basic - ( 24 Mar 2024 08:41 )    Color: x / Appearance: x / SG: x / pH: x  Gluc: 105 mg/dL / Ketone: x  / Bili: x / Urobili: x   Blood: x / Protein: x / Nitrite: x   Leuk Esterase: x / RBC: x / WBC x   Sq Epi: x / Non Sq Epi: x / Bacteria: x                RADIOLOGY:

## 2024-03-25 NOTE — PROGRESS NOTE ADULT - ATTENDING COMMENTS
pending approval from [facility] for peg placement  continue tube feeds  continue NG tube feeds  continue current treatment

## 2024-03-25 NOTE — PROVIDER CONTACT NOTE (OTHER) - REASON
NG tube flushed with resistance, pt coughing and unable to tolerate
New NG tube unable to flush, resistance met
elevated BP

## 2024-03-25 NOTE — PROVIDER CONTACT NOTE (OTHER) - SITUATION
New NG tube placed, okay to use by MD Haddad, However NG tube met with resistance. Unable to administer feeds/PO meds.
pt BP is 142/93 pulse 121
Pt NG noted to be out of place this AM, advanced back in by RN however still not in stomach as noted in XRAY. Further advanced by RN as per MD instruction however resistance met, unable to flush.
Anesthesia Type: 1% lidocaine with epinephrine

## 2024-03-25 NOTE — PROGRESS NOTE ADULT - ASSESSMENT
# Sepsis POA / Acute hypoxic resp failure / RSV bronchiolitis /  H/o Dysphagia/ suspected aspiration    - continue midodrine 20 Q8H  - completed caspo (end 3/10) per ID   - Failed FEES, currently on NG tube; needs PEG  - gi consulted for PEG , hunter jhaveri ([id number]) for consent   - c/w duoneb,   - on 2L NC, wean as tolerated   - pulmonary is following  - sp meropenem 1g q8h IV and  levaquin 750mg q24h; finished 3/20     #  Elevated Troponin , type 2 MI/  Sinus tachycardia  - c/w telemetry monitoring   - Repeat EKG: Normal sinus rhythm  - c/w  metoprolol  - TTE 2/19 normal EF no DD or valve abnormalities    # Seizure Disorder  - c/w  Keppra   - seizure precautions  - keep Mg above 2.0     # H/o lower ext DVT   - c/w therapeutic Lovenox, Eliquis on dc    #  Hypothyroidism  - TSH 0.51    # Normocytic Anemia, anemia of chronic disease   - monitor H/H, keep Hb above 7.5     # Down Syndrome/  Cerebral Palsy/ functional quadriplegia   - supportive care  - prevent falls and aspiration   - failed speech and swallow, needs PEG as above  - gi consulted for peg, consent needed from advisory board number in chart   - on Raloxifene     Pending: PEG placement; decision and consent from advisory board for PEG placement

## 2024-03-25 NOTE — PROVIDER CONTACT NOTE (OTHER) - ACTION/TREATMENT ORDERED:
recheck blood pressure
IV Keppra to be ordered as per MD.
Pediatric NG tube placed by MD. Awaiting confirmation to use. Afternoon PO meds held. MD aware.

## 2024-03-25 NOTE — PROVIDER CONTACT NOTE (OTHER) - RECOMMENDATIONS
To be replaced by MD.
Convert PO meds to IV if possible. Replace NG tube.
expressed patient was very upset when I was taking blood pressure. pt is non-verbal and started to cry

## 2024-03-26 LAB
ALBUMIN SERPL ELPH-MCNC: 3.2 G/DL — LOW (ref 3.5–5.2)
ALP SERPL-CCNC: 96 U/L — SIGNIFICANT CHANGE UP (ref 30–115)
ALT FLD-CCNC: 10 U/L — SIGNIFICANT CHANGE UP (ref 0–41)
ANION GAP SERPL CALC-SCNC: 10 MMOL/L — SIGNIFICANT CHANGE UP (ref 7–14)
AST SERPL-CCNC: 13 U/L — SIGNIFICANT CHANGE UP (ref 0–41)
BASOPHILS # BLD AUTO: 0.06 K/UL — SIGNIFICANT CHANGE UP (ref 0–0.2)
BASOPHILS NFR BLD AUTO: 0.8 % — SIGNIFICANT CHANGE UP (ref 0–1)
BILIRUB SERPL-MCNC: 0.2 MG/DL — SIGNIFICANT CHANGE UP (ref 0.2–1.2)
BUN SERPL-MCNC: 9 MG/DL — LOW (ref 10–20)
CALCIUM SERPL-MCNC: 9.2 MG/DL — SIGNIFICANT CHANGE UP (ref 8.4–10.5)
CHLORIDE SERPL-SCNC: 104 MMOL/L — SIGNIFICANT CHANGE UP (ref 98–110)
CO2 SERPL-SCNC: 27 MMOL/L — SIGNIFICANT CHANGE UP (ref 17–32)
CREAT SERPL-MCNC: 0.5 MG/DL — LOW (ref 0.7–1.5)
EGFR: 109 ML/MIN/1.73M2 — SIGNIFICANT CHANGE UP
EOSINOPHIL # BLD AUTO: 0.32 K/UL — SIGNIFICANT CHANGE UP (ref 0–0.7)
EOSINOPHIL NFR BLD AUTO: 4.4 % — SIGNIFICANT CHANGE UP (ref 0–8)
GLUCOSE BLDC GLUCOMTR-MCNC: 101 MG/DL — HIGH (ref 70–99)
GLUCOSE BLDC GLUCOMTR-MCNC: 151 MG/DL — HIGH (ref 70–99)
GLUCOSE BLDC GLUCOMTR-MCNC: 183 MG/DL — HIGH (ref 70–99)
GLUCOSE SERPL-MCNC: 140 MG/DL — HIGH (ref 70–99)
HCT VFR BLD CALC: 28.4 % — LOW (ref 37–47)
HGB BLD-MCNC: 8.6 G/DL — LOW (ref 12–16)
IMM GRANULOCYTES NFR BLD AUTO: 0.7 % — HIGH (ref 0.1–0.3)
LYMPHOCYTES # BLD AUTO: 1.77 K/UL — SIGNIFICANT CHANGE UP (ref 1.2–3.4)
LYMPHOCYTES # BLD AUTO: 24.1 % — SIGNIFICANT CHANGE UP (ref 20.5–51.1)
MAGNESIUM SERPL-MCNC: 2.4 MG/DL — SIGNIFICANT CHANGE UP (ref 1.8–2.4)
MCHC RBC-ENTMCNC: 23.7 PG — LOW (ref 27–31)
MCHC RBC-ENTMCNC: 30.3 G/DL — LOW (ref 32–37)
MCV RBC AUTO: 78.2 FL — LOW (ref 81–99)
MONOCYTES # BLD AUTO: 0.44 K/UL — SIGNIFICANT CHANGE UP (ref 0.1–0.6)
MONOCYTES NFR BLD AUTO: 6 % — SIGNIFICANT CHANGE UP (ref 1.7–9.3)
NEUTROPHILS # BLD AUTO: 4.71 K/UL — SIGNIFICANT CHANGE UP (ref 1.4–6.5)
NEUTROPHILS NFR BLD AUTO: 64 % — SIGNIFICANT CHANGE UP (ref 42.2–75.2)
NRBC # BLD: 0 /100 WBCS — SIGNIFICANT CHANGE UP (ref 0–0)
PLATELET # BLD AUTO: 445 K/UL — HIGH (ref 130–400)
PMV BLD: 10.3 FL — SIGNIFICANT CHANGE UP (ref 7.4–10.4)
POTASSIUM SERPL-MCNC: 4.2 MMOL/L — SIGNIFICANT CHANGE UP (ref 3.5–5)
POTASSIUM SERPL-SCNC: 4.2 MMOL/L — SIGNIFICANT CHANGE UP (ref 3.5–5)
PROT SERPL-MCNC: 6.7 G/DL — SIGNIFICANT CHANGE UP (ref 6–8)
RBC # BLD: 3.63 M/UL — LOW (ref 4.2–5.4)
RBC # FLD: 20.6 % — HIGH (ref 11.5–14.5)
SODIUM SERPL-SCNC: 141 MMOL/L — SIGNIFICANT CHANGE UP (ref 135–146)
WBC # BLD: 7.35 K/UL — SIGNIFICANT CHANGE UP (ref 4.8–10.8)
WBC # FLD AUTO: 7.35 K/UL — SIGNIFICANT CHANGE UP (ref 4.8–10.8)

## 2024-03-26 PROCEDURE — 99231 SBSQ HOSP IP/OBS SF/LOW 25: CPT

## 2024-03-26 RX ORDER — SODIUM CHLORIDE 9 MG/ML
1000 INJECTION, SOLUTION INTRAVENOUS ONCE
Refills: 0 | Status: COMPLETED | OUTPATIENT
Start: 2024-03-26 | End: 2024-03-26

## 2024-03-26 RX ORDER — LEVETIRACETAM 250 MG/1
500 TABLET, FILM COATED ORAL
Refills: 0 | Status: DISCONTINUED | OUTPATIENT
Start: 2024-03-26 | End: 2024-04-08

## 2024-03-26 RX ADMIN — ALBUTEROL 2.5 MILLIGRAM(S): 90 AEROSOL, METERED ORAL at 15:15

## 2024-03-26 RX ADMIN — Medication 1 APPLICATION(S): at 18:18

## 2024-03-26 RX ADMIN — Medication 1 APPLICATION(S): at 11:42

## 2024-03-26 RX ADMIN — ENOXAPARIN SODIUM 50 MILLIGRAM(S): 100 INJECTION SUBCUTANEOUS at 11:41

## 2024-03-26 RX ADMIN — Medication 4 MILLILITER(S): at 20:42

## 2024-03-26 RX ADMIN — IRON SUCROSE 110 MILLIGRAM(S): 20 INJECTION, SOLUTION INTRAVENOUS at 11:41

## 2024-03-26 RX ADMIN — MIDODRINE HYDROCHLORIDE 20 MILLIGRAM(S): 2.5 TABLET ORAL at 06:41

## 2024-03-26 RX ADMIN — CHLORHEXIDINE GLUCONATE 1 APPLICATION(S): 213 SOLUTION TOPICAL at 14:36

## 2024-03-26 RX ADMIN — Medication 1 APPLICATION(S): at 06:42

## 2024-03-26 RX ADMIN — ALBUTEROL 2.5 MILLIGRAM(S): 90 AEROSOL, METERED ORAL at 02:01

## 2024-03-26 RX ADMIN — LEVETIRACETAM 500 MILLIGRAM(S): 250 TABLET, FILM COATED ORAL at 18:18

## 2024-03-26 RX ADMIN — MIDODRINE HYDROCHLORIDE 20 MILLIGRAM(S): 2.5 TABLET ORAL at 18:18

## 2024-03-26 RX ADMIN — PANTOPRAZOLE SODIUM 40 MILLIGRAM(S): 20 TABLET, DELAYED RELEASE ORAL at 06:40

## 2024-03-26 RX ADMIN — ALBUTEROL 2.5 MILLIGRAM(S): 90 AEROSOL, METERED ORAL at 20:41

## 2024-03-26 RX ADMIN — GABAPENTIN 300 MILLIGRAM(S): 400 CAPSULE ORAL at 06:41

## 2024-03-26 RX ADMIN — Medication 4 MILLILITER(S): at 15:16

## 2024-03-26 RX ADMIN — Medication 4 MILLILITER(S): at 09:12

## 2024-03-26 RX ADMIN — GABAPENTIN 300 MILLIGRAM(S): 400 CAPSULE ORAL at 18:18

## 2024-03-26 RX ADMIN — Medication 250 MILLIGRAM(S): at 18:18

## 2024-03-26 RX ADMIN — ENOXAPARIN SODIUM 50 MILLIGRAM(S): 100 INJECTION SUBCUTANEOUS at 22:51

## 2024-03-26 RX ADMIN — Medication 250 MILLIGRAM(S): at 06:41

## 2024-03-26 RX ADMIN — Medication 4 MILLILITER(S): at 01:57

## 2024-03-26 RX ADMIN — Medication 2 SPRAY(S): at 06:42

## 2024-03-26 RX ADMIN — Medication 1 DROP(S): at 06:43

## 2024-03-26 RX ADMIN — Medication 1 DROP(S): at 18:24

## 2024-03-26 RX ADMIN — RALOXIFENE HYDROCHLORIDE 60 MILLIGRAM(S): 60 TABLET, COATED ORAL at 11:41

## 2024-03-26 RX ADMIN — ALBUTEROL 2.5 MILLIGRAM(S): 90 AEROSOL, METERED ORAL at 09:12

## 2024-03-26 RX ADMIN — MIDODRINE HYDROCHLORIDE 20 MILLIGRAM(S): 2.5 TABLET ORAL at 11:42

## 2024-03-26 RX ADMIN — LEVETIRACETAM 500 MILLIGRAM(S): 250 TABLET, FILM COATED ORAL at 11:41

## 2024-03-26 NOTE — PROGRESS NOTE ADULT - ASSESSMENT
# Sepsis POA / Acute hypoxic resp failure / RSV bronchiolitis /  H/o Dysphagia/ suspected aspiration    - continue midodrine 20 Q8H  - completed caspo (end 3/10) per ID   - Failed FEES, currently on NG tube; needs PEG  - gi consulted for PEG , hunter jhaveri ([id number]) for consent   - c/w albuterol  - on 2L NC, wean as tolerated   - sp meropenem 1g q8h IV and  levaquin 750mg q24h; finished 3/20     #  Elevated Troponin , type 2 MI/  Sinus tachycardia  - c/w telemetry monitoring   - Repeat EKG: Normal sinus rhythm  - sp metoprolol  - TTE 2/19 normal EF no DD or valve abnormalities    # Seizure Disorder  - c/w  Keppra   - seizure precautions  - keep Mg above 2.0     # H/o lower ext DVT   - c/w therapeutic Lovenox, Eliquis on dc    #  Hypothyroidism  - TSH 0.51    # Normocytic Anemia, anemia of chronic disease   - monitor H/H, keep Hb above 7.5     # Down Syndrome/  Cerebral Palsy/ functional quadriplegia   - supportive care  - prevent falls and aspiration   - failed speech and swallow, needs PEG as above  - gi consulted for peg, consent needed from advisory board number in chart   - on Raloxifene     Pending: PEG placement; decision and consent from advisory board for PEG placement # Sepsis POA / Acute hypoxic resp failure / RSV bronchiolitis /  H/o Dysphagia/ suspected aspiration    - continue midodrine 20 Q8H  - completed caspo (end 3/10) per ID   - Failed FEES, currently on NG tube; needs PEG  - gi consulted for PEG , hunter jhaveri ([id number]) for consent   - c/w albuterol  - on 2L NC, wean as tolerated   - sp meropenem 1g q8h IV and  levaquin 750mg q24h; finished 3/20     #  Elevated Troponin , type 2 MI/  Sinus tachycardia  - c/w telemetry monitoring   - Repeat EKG: Normal sinus rhythm  - sp metoprolol  - TTE 2/19 normal EF no DD or valve abnormalities    # Seizure Disorder  - c/w  Keppra   - seizure precautions  - keep Mg above 2.0     # H/o lower ext DVT   - c/w therapeutic Lovenox, Eliquis on dc    #  Hypothyroidism  - TSH 0.51    # Normocytic Anemia, anemia of chronic disease   - monitor H/H, keep Hb above 7.5     # Down Syndrome/  Cerebral Palsy/ functional quadriplegia   - supportive care  - prevent falls and aspiration   - failed speech and swallow, needs PEG as above  - gi consulted for peg, consent needed from advisory board number in chart   - on Raloxifene     Pending: PEG placement; decision and consent from advisory board for PEG placement

## 2024-03-26 NOTE — PROGRESS NOTE ADULT - SUBJECTIVE AND OBJECTIVE BOX
24H events:    Patient is a 58y old Female who presents with a chief complaint of Respiratory Distress (25 Mar 2024 09:30)    Primary diagnosis of Sepsis with acute hypoxic respiratory failure    Today is hospital day 66d. This morning patient was seen and examined at bedside, resting comfortably in bed.    No acute or major events overnight.      PAST MEDICAL & SURGICAL HISTORY  Down syndrome    Osteoporosis    Mild anemia    Neuropathy    S/P debridement  of R hip on 3/2/21      SOCIAL HISTORY:  Social History:      ALLERGIES:  No Known Allergies    MEDICATIONS:  STANDING MEDICATIONS  acetylcysteine 20%  Inhalation 4 milliLiter(s) Inhalation every 6 hours  albuterol    0.083% 2.5 milliGRAM(s) Nebulizer every 6 hours  albuterol    90 MICROgram(s) HFA Inhaler 1 Puff(s) Inhalation every 4 hours  AQUAPHOR (petrolatum Ointment) 1 Application(s) Topical two times a day  artificial  tears Solution 1 Drop(s) Both EYES two times a day  chlorhexidine 2% Cloths 1 Application(s) Topical daily  enoxaparin Injectable 50 milliGRAM(s) SubCutaneous every 12 hours  erythromycin   Ointment 1 Application(s) Both EYES daily  gabapentin 300 milliGRAM(s) Oral every 12 hours  iron sucrose IVPB 200 milliGRAM(s) IV Intermittent every 24 hours  levETIRAcetam  Solution 500 milliGRAM(s) Oral two times a day  melatonin 3 milliGRAM(s) Oral at bedtime  midodrine. 20 milliGRAM(s) Oral three times a day  pantoprazole  Injectable 40 milliGRAM(s) IV Push every 24 hours  polyethylene glycol 3350 17 Gram(s) Oral at bedtime  raloxifene 60 milliGRAM(s) Oral daily  saccharomyces boulardii 250 milliGRAM(s) Oral two times a day  senna 2 Tablet(s) Oral at bedtime  sodium chloride 0.65% Nasal 2 Spray(s) Both Nostrils three times a day    PRN MEDICATIONS  acetaminophen     Tablet .. 650 milliGRAM(s) Oral every 8 hours PRN  aluminum hydroxide/magnesium hydroxide/simethicone Suspension 30 milliLiter(s) Oral every 4 hours PRN  ondansetron Injectable 4 milliGRAM(s) IV Push every 8 hours PRN    VITALS:   T(F): 97.1  HR: 58  BP: 100/50  RR: 18  SpO2: --    PHYSICAL EXAM:  GENERAL:   ( x ) NAD, lying in bed comfortably     (  ) obtunded     (  ) lethargic     (  ) somnolent    HEAD:   (  ) Atraumatic     (  ) hematoma     (  ) laceration (specify location:       )     NECK:  (  ) Supple     (  ) neck stiffness     (  ) nuchal rigidity     (  )  no JVD     (  ) JVD present ( -- cm)    HEART:  Rate -->     ( x ) normal rate     (  ) bradycardic     (  ) tachycardic  Rhythm -->     (  ) regular     (  ) regularly irregular     (  ) irregularly irregular  Murmurs -->     (  ) normal s1s2     ( x ) systolic murmur     (  ) diastolic murmur     (  ) continuous murmur      (  ) S3 present     (  ) S4 present    LUNGS:   ( x )Unlabored respirations     (  ) tachypnea  ( x ) B/L air entry     (  ) decreased breath sounds in:  (location     )    ( x ) no adventitious sound     (  ) crackles     (  ) wheezing      (  ) rhonchi      (specify location:       )  (  ) chest wall tenderness (specify location:       )    ABDOMEN:   ( x ) Soft     (  ) tense   |   ( x ) nondistended     (  ) distended   |   (  ) +BS     (  ) hypoactive bowel sounds     (  ) hyperactive bowel sounds  ( x ) nontender     (  ) RUQ tenderness     (  ) RLQ tenderness     (  ) LLQ tenderness     (  ) epigastric tenderness     (  ) diffuse tenderness  (  ) Splenomegaly      (  ) Hepatomegaly      (  ) Jaundice     (  ) ecchymosis     EXTREMITIES:  ( x ) Normal     (  ) Rash     (  ) ecchymosis     (  ) varicose veins      (  ) pitting edema     (  ) non-pitting edema   (  ) ulceration     (  ) gangrene:     (location:     )    NERVOUS SYSTEM:    ( x ) A&Ox0     (  ) confused     (  ) lethargic  CN II-XII:     (  ) Intact     (  ) deficits found     (Specify:     )   Upper extremities:     (  ) no sensorimotor deficits     (  ) weakness     (  ) loss of proprioception/vibration     (  ) loss of touch/temperature (specify:    )  Lower extremities:     (  ) no sensorimotor deficits     (  ) weakness     (  ) loss of proprioception/vibration     (  ) loss of touch/temperature (specify:    )    SKIN:   (  ) No rashes or lesions     (  ) maculopapular rash     (  ) pustules     (  ) vesicles     (  ) ulcer     (  ) ecchymosis     (specify location:     )      LABS:                        8.6    7.35  )-----------( 445      ( 26 Mar 2024 06:59 )             28.4     03-26    141  |  104  |  9<L>  ----------------------------<  140<H>  4.2   |  27  |  0.5<L>    Ca    9.2      26 Mar 2024 06:59  Mg     2.4     03-26    TPro  6.7  /  Alb  3.2<L>  /  TBili  0.2  /  DBili  x   /  AST  13  /  ALT  10  /  AlkPhos  96  03-26      Urinalysis Basic - ( 26 Mar 2024 06:59 )    Color: x / Appearance: x / SG: x / pH: x  Gluc: 140 mg/dL / Ketone: x  / Bili: x / Urobili: x   Blood: x / Protein: x / Nitrite: x   Leuk Esterase: x / RBC: x / WBC x   Sq Epi: x / Non Sq Epi: x / Bacteria: x                RADIOLOGY:

## 2024-03-26 NOTE — PROGRESS NOTE ADULT - ATTENDING COMMENTS
58F w/ PMHx Down Syndrome, nonverbal at baseline, Hypothyroidism, Cerebral palsy and Seizure Disorders presents to the ED from nursing facility/group home ([facility]) presented to ED for respiratory distress and high grade fever. Patient found to be septic on admission. Admitted to SDU for management of acute respiratory distress 2/2 CAP/Aspiration PNA (20 Jan 2024 18:22)  While pt was on the floor she developed dyspnea  consulted by pulmonary/critical care and was upgraded back to SDU.    A/P    # dysphagia- plan for PEG  hold lovenox prior to PEG  pending consent from advisory board- under review currently   office phone number is [phone]    # Sepsis POA - improved  - Acute hypoxic resp failure - multifactorial RSV bronchiolitis on admission; then concern for possible gram negative pneumonia and suspected aspiration  - Staph epidermidis bacteremia in 3/14  - staph hominis  on presentation -  contaminant  repeat cultures 3/15- NGTD  ID following- s/p danna and levaquin for 7 days till 3/20; and vanc 5 days from time of negative cultures- completed  - midline removed  fungitell -81; no further intervention; recheck level in 1 week  - s/p steroid course  - currently on nasal cannula 2L; -  wean down on oxygen as tolerated    # Seizure Disorder  - c/w  Keppra - change to solution  - seizure precautions  - keep Mg above 2.0     # H/o lower ext DVT   - c/w therapeutic Lovenox, Eliquis on dc    #  Hypothyroidism  - TSH 0.51    # microcytic Anemia, anemia of chronic disease   - monitor H/H,  ferritin- 281, transferring saturation <6%; possible iron deficiency- started on venofer    #  Elevated Troponin , type 2 MI/  Sinus tachycardia- c/w  metoprolol  - TTE 2/19 normal EF no DD or valve abnormalities    # h/o duodenal ulceration  continue PPI bid; change to daily after EGD/PEG if stable    # Down Syndrome/  Cerebral Palsy/ functional quadriplegia   - supportive care  - prevent falls and aspiration   - failed speech and swallow, might  need  PEG ( see above)  - on Raloxifene     # multiple pressure ulcers- continue wound care per recommendation from wound care; d/w nursing staff    # hypotension- on midodrine; continue    Palliative care team is following ,  pt is  DNR/DNI ( approved by [facility]) , consent  for PEG tube from [employer] [place] [employer] pending    Pending: PEG placement, consent and decision from advisory board regarding PEg placement

## 2024-03-26 NOTE — CHART NOTE - NSCHARTNOTEFT_GEN_A_CORE
Called for BP reading 76/46  Additionally, informed by RN that earlier when the pt was getting a neb treatment her NGT was dislodged and he pushed it back in and secured it  Assessed the pt, BP cuff too large, Repeat BP with appropriate sized cuff 83/53 MAP 63. Pt saturating 88% on RA. Pt warm, well perfused, good radial pulses. Pt withdrawing to pain.  2L NC placed on pt with improvement of SpO2 to 99.   F/u CXR for NGT placement, monitor BP

## 2024-03-27 LAB
ALBUMIN SERPL ELPH-MCNC: 3 G/DL — LOW (ref 3.5–5.2)
ALP SERPL-CCNC: 84 U/L — SIGNIFICANT CHANGE UP (ref 30–115)
ALT FLD-CCNC: 8 U/L — SIGNIFICANT CHANGE UP (ref 0–41)
ANION GAP SERPL CALC-SCNC: 11 MMOL/L — SIGNIFICANT CHANGE UP (ref 7–14)
AST SERPL-CCNC: 11 U/L — SIGNIFICANT CHANGE UP (ref 0–41)
BASOPHILS # BLD AUTO: 0.08 K/UL — SIGNIFICANT CHANGE UP (ref 0–0.2)
BASOPHILS NFR BLD AUTO: 0.8 % — SIGNIFICANT CHANGE UP (ref 0–1)
BILIRUB SERPL-MCNC: 0.3 MG/DL — SIGNIFICANT CHANGE UP (ref 0.2–1.2)
BUN SERPL-MCNC: 8 MG/DL — LOW (ref 10–20)
CALCIUM SERPL-MCNC: 8.8 MG/DL — SIGNIFICANT CHANGE UP (ref 8.4–10.5)
CHLORIDE SERPL-SCNC: 101 MMOL/L — SIGNIFICANT CHANGE UP (ref 98–110)
CO2 SERPL-SCNC: 26 MMOL/L — SIGNIFICANT CHANGE UP (ref 17–32)
CREAT SERPL-MCNC: <0.5 MG/DL — LOW (ref 0.7–1.5)
EGFR: 115 ML/MIN/1.73M2 — SIGNIFICANT CHANGE UP
EOSINOPHIL # BLD AUTO: 0.35 K/UL — SIGNIFICANT CHANGE UP (ref 0–0.7)
EOSINOPHIL NFR BLD AUTO: 3.6 % — SIGNIFICANT CHANGE UP (ref 0–8)
GLUCOSE BLDC GLUCOMTR-MCNC: 116 MG/DL — HIGH (ref 70–99)
GLUCOSE BLDC GLUCOMTR-MCNC: 156 MG/DL — HIGH (ref 70–99)
GLUCOSE BLDC GLUCOMTR-MCNC: 163 MG/DL — HIGH (ref 70–99)
GLUCOSE SERPL-MCNC: 81 MG/DL — SIGNIFICANT CHANGE UP (ref 70–99)
HCT VFR BLD CALC: 25.8 % — LOW (ref 37–47)
HGB BLD-MCNC: 7.9 G/DL — LOW (ref 12–16)
IMM GRANULOCYTES NFR BLD AUTO: 0.7 % — HIGH (ref 0.1–0.3)
LYMPHOCYTES # BLD AUTO: 2.2 K/UL — SIGNIFICANT CHANGE UP (ref 1.2–3.4)
LYMPHOCYTES # BLD AUTO: 22.8 % — SIGNIFICANT CHANGE UP (ref 20.5–51.1)
MAGNESIUM SERPL-MCNC: 2.3 MG/DL — SIGNIFICANT CHANGE UP (ref 1.8–2.4)
MCHC RBC-ENTMCNC: 24.5 PG — LOW (ref 27–31)
MCHC RBC-ENTMCNC: 30.6 G/DL — LOW (ref 32–37)
MCV RBC AUTO: 79.9 FL — LOW (ref 81–99)
MONOCYTES # BLD AUTO: 0.42 K/UL — SIGNIFICANT CHANGE UP (ref 0.1–0.6)
MONOCYTES NFR BLD AUTO: 4.4 % — SIGNIFICANT CHANGE UP (ref 1.7–9.3)
NEUTROPHILS # BLD AUTO: 6.53 K/UL — HIGH (ref 1.4–6.5)
NEUTROPHILS NFR BLD AUTO: 67.7 % — SIGNIFICANT CHANGE UP (ref 42.2–75.2)
NRBC # BLD: 0 /100 WBCS — SIGNIFICANT CHANGE UP (ref 0–0)
PLATELET # BLD AUTO: 444 K/UL — HIGH (ref 130–400)
PMV BLD: 10.3 FL — SIGNIFICANT CHANGE UP (ref 7.4–10.4)
POTASSIUM SERPL-MCNC: 4.4 MMOL/L — SIGNIFICANT CHANGE UP (ref 3.5–5)
POTASSIUM SERPL-SCNC: 4.4 MMOL/L — SIGNIFICANT CHANGE UP (ref 3.5–5)
PROT SERPL-MCNC: 5.8 G/DL — LOW (ref 6–8)
RBC # BLD: 3.23 M/UL — LOW (ref 4.2–5.4)
RBC # FLD: 21.2 % — HIGH (ref 11.5–14.5)
SODIUM SERPL-SCNC: 138 MMOL/L — SIGNIFICANT CHANGE UP (ref 135–146)
WBC # BLD: 9.65 K/UL — SIGNIFICANT CHANGE UP (ref 4.8–10.8)
WBC # FLD AUTO: 9.65 K/UL — SIGNIFICANT CHANGE UP (ref 4.8–10.8)

## 2024-03-27 PROCEDURE — 71045 X-RAY EXAM CHEST 1 VIEW: CPT | Mod: 26,77,76

## 2024-03-27 PROCEDURE — 71045 X-RAY EXAM CHEST 1 VIEW: CPT | Mod: 26

## 2024-03-27 PROCEDURE — 99232 SBSQ HOSP IP/OBS MODERATE 35: CPT

## 2024-03-27 RX ADMIN — ALBUTEROL 2.5 MILLIGRAM(S): 90 AEROSOL, METERED ORAL at 09:32

## 2024-03-27 RX ADMIN — ENOXAPARIN SODIUM 50 MILLIGRAM(S): 100 INJECTION SUBCUTANEOUS at 11:00

## 2024-03-27 RX ADMIN — Medication 1 APPLICATION(S): at 19:01

## 2024-03-27 RX ADMIN — SENNA PLUS 2 TABLET(S): 8.6 TABLET ORAL at 21:44

## 2024-03-27 RX ADMIN — SODIUM CHLORIDE 1000 MILLILITER(S): 9 INJECTION, SOLUTION INTRAVENOUS at 00:06

## 2024-03-27 RX ADMIN — Medication 4 MILLILITER(S): at 19:52

## 2024-03-27 RX ADMIN — Medication 2 SPRAY(S): at 13:49

## 2024-03-27 RX ADMIN — LEVETIRACETAM 500 MILLIGRAM(S): 250 TABLET, FILM COATED ORAL at 17:30

## 2024-03-27 RX ADMIN — Medication 1 DROP(S): at 19:01

## 2024-03-27 RX ADMIN — Medication 2 SPRAY(S): at 06:13

## 2024-03-27 RX ADMIN — MIDODRINE HYDROCHLORIDE 20 MILLIGRAM(S): 2.5 TABLET ORAL at 12:11

## 2024-03-27 RX ADMIN — LEVETIRACETAM 500 MILLIGRAM(S): 250 TABLET, FILM COATED ORAL at 19:02

## 2024-03-27 RX ADMIN — MIDODRINE HYDROCHLORIDE 20 MILLIGRAM(S): 2.5 TABLET ORAL at 17:30

## 2024-03-27 RX ADMIN — ALBUTEROL 2.5 MILLIGRAM(S): 90 AEROSOL, METERED ORAL at 14:42

## 2024-03-27 RX ADMIN — ALBUTEROL 2.5 MILLIGRAM(S): 90 AEROSOL, METERED ORAL at 19:51

## 2024-03-27 RX ADMIN — Medication 3 MILLIGRAM(S): at 21:44

## 2024-03-27 RX ADMIN — Medication 1 APPLICATION(S): at 06:12

## 2024-03-27 RX ADMIN — GABAPENTIN 300 MILLIGRAM(S): 400 CAPSULE ORAL at 17:29

## 2024-03-27 RX ADMIN — Medication 4 MILLILITER(S): at 09:32

## 2024-03-27 RX ADMIN — RALOXIFENE HYDROCHLORIDE 60 MILLIGRAM(S): 60 TABLET, COATED ORAL at 11:13

## 2024-03-27 RX ADMIN — Medication 250 MILLIGRAM(S): at 17:29

## 2024-03-27 RX ADMIN — Medication 1 DROP(S): at 06:12

## 2024-03-27 RX ADMIN — POLYETHYLENE GLYCOL 3350 17 GRAM(S): 17 POWDER, FOR SOLUTION ORAL at 21:45

## 2024-03-27 RX ADMIN — Medication 4 MILLILITER(S): at 14:42

## 2024-03-27 RX ADMIN — CHLORHEXIDINE GLUCONATE 1 APPLICATION(S): 213 SOLUTION TOPICAL at 12:11

## 2024-03-27 RX ADMIN — Medication 1 APPLICATION(S): at 11:01

## 2024-03-27 RX ADMIN — IRON SUCROSE 110 MILLIGRAM(S): 20 INJECTION, SOLUTION INTRAVENOUS at 12:11

## 2024-03-27 RX ADMIN — ENOXAPARIN SODIUM 50 MILLIGRAM(S): 100 INJECTION SUBCUTANEOUS at 21:54

## 2024-03-27 NOTE — PROGRESS NOTE ADULT - ASSESSMENT
58F w/ PMHx Down Syndrome, nonverbal at baseline, Hypothyroidism, Cerebral palsy and Seizure Disorders presents to the ED from nursing facility/group home ([facility]) presented to ED for respiratory distress and high grade fever. Patient found to be septic on admission. Admitted to SDU for management of acute respiratory distress 2/2 CAP/Aspiration PNA (20 Jan 2024 18:22)  While pt was on the floor she developed dyspnea  consulted by pulmonary/critical care and was upgraded back to SDU.    A/P    # dysphagia- plan for PEG  hold lovenox prior to PEG on NG tube feeding  pending consent from advisory board- under review currently   office phone number is [phone]    # Sepsis POA - improved  - Acute hypoxic resp failure - multifactorial RSV bronchiolitis on admission; then concern for possible gram negative pneumonia and suspected aspiration  - Staph epidermidis bacteremia in 3/14  - staph hominis  on presentation -  contaminant  repeat cultures 3/15- NGTD  ID following- s/p danna and levaquin for 7 days till 3/20; and vanc 5 days from time of negative cultures- completed  - midline removed  fungitell -81; no further intervention; recheck level in 1 week  - s/p steroid course  - currently on nasal cannula 2L; -  wean down on oxygen as tolerated    # Seizure Disorder  - c/w  Keppra - change to solution  - seizure precautions  - keep Mg above 2.0     # H/o lower ext DVT   - c/w therapeutic Lovenox, Eliquis on dc    #  Hypothyroidism  - TSH 0.51    # microcytic Anemia, anemia of chronic disease   - monitor H/H,  ferritin- 281, transferring saturation <6%; possible iron deficiency- started on venofer-- day 4    #  Elevated Troponin , type 2 MI/  Sinus tachycardia- c/w  metoprolol  - TTE 2/19 normal EF no DD or valve abnormalities    # h/o duodenal ulceration  continue PPI bid; change to daily after EGD/PEG if stable    # Down Syndrome/  Cerebral Palsy/ functional quadriplegia   - supportive care  - prevent falls and aspiration   - failed speech and swallow, might  need  PEG ( see above)  - on Raloxifene     # multiple pressure ulcers- continue wound care per recommendation from wound care; d/w nursing staff    # hypotension- on midodrine; continue    pending consent for PEG

## 2024-03-27 NOTE — PROGRESS NOTE ADULT - SUBJECTIVE AND OBJECTIVE BOX
24H events:    Patient is a 58y old Female who presents with a chief complaint of Respiratory Distress (26 Mar 2024 09:16)  Primary diagnosis of Sepsis with acute hypoxic respiratory failure    Today is hospital day 67d. This morning patient was seen and examined at bedside, resting comfortably in bed.      Overnight patient's NG tube was re-adjusted and she was placed on NC. This morning she was saturating 94% on room air.       PAST MEDICAL & SURGICAL HISTORY  Down syndrome    Osteoporosis    Mild anemia    Neuropathy    S/P debridement  of R hip on 3/2/21      SOCIAL HISTORY:  Social History:      ALLERGIES:  No Known Allergies    MEDICATIONS:  STANDING MEDICATIONS  acetylcysteine 20%  Inhalation 4 milliLiter(s) Inhalation every 6 hours  albuterol    0.083% 2.5 milliGRAM(s) Nebulizer every 6 hours  albuterol    90 MICROgram(s) HFA Inhaler 1 Puff(s) Inhalation every 4 hours  AQUAPHOR (petrolatum Ointment) 1 Application(s) Topical two times a day  artificial  tears Solution 1 Drop(s) Both EYES two times a day  chlorhexidine 2% Cloths 1 Application(s) Topical daily  enoxaparin Injectable 50 milliGRAM(s) SubCutaneous every 12 hours  erythromycin   Ointment 1 Application(s) Both EYES daily  gabapentin 300 milliGRAM(s) Oral every 12 hours  iron sucrose IVPB 200 milliGRAM(s) IV Intermittent every 24 hours  levETIRAcetam  Solution 500 milliGRAM(s) Oral two times a day  melatonin 3 milliGRAM(s) Oral at bedtime  midodrine. 20 milliGRAM(s) Oral three times a day  pantoprazole  Injectable 40 milliGRAM(s) IV Push every 24 hours  polyethylene glycol 3350 17 Gram(s) Oral at bedtime  raloxifene 60 milliGRAM(s) Oral daily  saccharomyces boulardii 250 milliGRAM(s) Oral two times a day  senna 2 Tablet(s) Oral at bedtime  sodium chloride 0.65% Nasal 2 Spray(s) Both Nostrils three times a day    PRN MEDICATIONS  acetaminophen     Tablet .. 650 milliGRAM(s) Oral every 8 hours PRN  aluminum hydroxide/magnesium hydroxide/simethicone Suspension 30 milliLiter(s) Oral every 4 hours PRN  ondansetron Injectable 4 milliGRAM(s) IV Push every 8 hours PRN    VITALS:   T(F): 96.4  HR: 69  BP: 102/56  RR: 18  SpO2: 100%    PHYSICAL EXAM:  GENERAL:   ( x ) NAD, lying in bed comfortably     (  ) obtunded     (  ) lethargic     (  ) somnolent    HEAD:   (  ) Atraumatic     (  ) hematoma     (  ) laceration (specify location:       )     NECK:  (  ) Supple     (  ) neck stiffness     (  ) nuchal rigidity     (  )  no JVD     (  ) JVD present ( -- cm)    HEART:  Rate -->     ( x ) normal rate     (  ) bradycardic     (  ) tachycardic  Rhythm -->     (  ) regular     (  ) regularly irregular     (  ) irregularly irregular  Murmurs -->     (  ) normal s1s2     ( x ) systolic murmur     (  ) diastolic murmur     (  ) continuous murmur      (  ) S3 present     (  ) S4 present    LUNGS:   ( x )Unlabored respirations     (  ) tachypnea  ( x ) B/L air entry     (  ) decreased breath sounds in:  (location     )    ( x ) no adventitious sound     (  ) crackles     (  ) wheezing      (  ) rhonchi      (specify location:       )  (  ) chest wall tenderness (specify location:       )    ABDOMEN:   ( x ) Soft     (  ) tense   |   ( x ) nondistended     (  ) distended   |   (  ) +BS     (  ) hypoactive bowel sounds     (  ) hyperactive bowel sounds  ( x ) nontender     (  ) RUQ tenderness     (  ) RLQ tenderness     (  ) LLQ tenderness     (  ) epigastric tenderness     (  ) diffuse tenderness  (  ) Splenomegaly      (  ) Hepatomegaly      (  ) Jaundice     (  ) ecchymosis     EXTREMITIES:  ( x ) Normal     (  ) Rash     (  ) ecchymosis     (  ) varicose veins      (  ) pitting edema     (  ) non-pitting edema   (  ) ulceration     (  ) gangrene:     (location:     )    NERVOUS SYSTEM:    ( x ) A&Ox0     (  ) confused     (  ) lethargic  CN II-XII:     (  ) Intact     (  ) deficits found     (Specify:     )   Upper extremities:     (  ) no sensorimotor deficits     (  ) weakness     (  ) loss of proprioception/vibration     (  ) loss of touch/temperature (specify:    )  Lower extremities:     (  ) no sensorimotor deficits     (  ) weakness     (  ) loss of proprioception/vibration     (  ) loss of touch/temperature (specify:    )    SKIN:   (  ) No rashes or lesions     (  ) maculopapular rash     (  ) pustules     (  ) vesicles     (  ) ulcer     (  ) ecchymosis     (specify location:     )        LABS:                        7.9    9.65  )-----------( 444      ( 27 Mar 2024 07:09 )             25.8     03-27    138  |  101  |  8<L>  ----------------------------<  81  4.4   |  26  |  <0.5<L>    Ca    8.8      27 Mar 2024 07:09  Mg     2.3     03-27    TPro  5.8<L>  /  Alb  3.0<L>  /  TBili  0.3  /  DBili  x   /  AST  11  /  ALT  8   /  AlkPhos  84  03-27      Urinalysis Basic - ( 27 Mar 2024 07:09 )    Color: x / Appearance: x / SG: x / pH: x  Gluc: 81 mg/dL / Ketone: x  / Bili: x / Urobili: x   Blood: x / Protein: x / Nitrite: x   Leuk Esterase: x / RBC: x / WBC x   Sq Epi: x / Non Sq Epi: x / Bacteria: x                RADIOLOGY:

## 2024-03-27 NOTE — PROGRESS NOTE ADULT - SUBJECTIVE AND OBJECTIVE BOX
SUBJECTIVE:    Patient is a 58y old Female who presents with a chief complaint of Respiratory Distress (27 Mar 2024 10:07)    Currently admitted to medicine with the primary diagnosis of Sepsis with acute hypoxic respiratory failure       Today is hospital day 67d.     PAST MEDICAL & SURGICAL HISTORY  Down syndrome    Osteoporosis    Mild anemia    Neuropathy    S/P debridement  of R hip on 3/2/21      ALLERGIES:  No Known Allergies    MEDICATIONS:  STANDING MEDICATIONS  acetylcysteine 20%  Inhalation 4 milliLiter(s) Inhalation every 6 hours  albuterol    0.083% 2.5 milliGRAM(s) Nebulizer every 6 hours  albuterol    90 MICROgram(s) HFA Inhaler 1 Puff(s) Inhalation every 4 hours  AQUAPHOR (petrolatum Ointment) 1 Application(s) Topical two times a day  artificial  tears Solution 1 Drop(s) Both EYES two times a day  chlorhexidine 2% Cloths 1 Application(s) Topical daily  enoxaparin Injectable 50 milliGRAM(s) SubCutaneous every 12 hours  erythromycin   Ointment 1 Application(s) Both EYES daily  gabapentin 300 milliGRAM(s) Oral every 12 hours  iron sucrose IVPB 200 milliGRAM(s) IV Intermittent every 24 hours  levETIRAcetam  Solution 500 milliGRAM(s) Oral two times a day  melatonin 3 milliGRAM(s) Oral at bedtime  midodrine. 20 milliGRAM(s) Oral three times a day  pantoprazole  Injectable 40 milliGRAM(s) IV Push every 24 hours  polyethylene glycol 3350 17 Gram(s) Oral at bedtime  raloxifene 60 milliGRAM(s) Oral daily  saccharomyces boulardii 250 milliGRAM(s) Oral two times a day  senna 2 Tablet(s) Oral at bedtime  sodium chloride 0.65% Nasal 2 Spray(s) Both Nostrils three times a day    PRN MEDICATIONS  acetaminophen     Tablet .. 650 milliGRAM(s) Oral every 8 hours PRN  aluminum hydroxide/magnesium hydroxide/simethicone Suspension 30 milliLiter(s) Oral every 4 hours PRN  ondansetron Injectable 4 milliGRAM(s) IV Push every 8 hours PRN    VITALS:   T(F): 96.4  HR: 69  BP: 102/56  RR: 18  SpO2: 100%    LABS:                        7.9    9.65  )-----------( 444      ( 27 Mar 2024 07:09 )             25.8     03-27    138  |  101  |  8<L>  ----------------------------<  81  4.4   |  26  |  <0.5<L>    Ca    8.8      27 Mar 2024 07:09  Mg     2.3     03-27    TPro  5.8<L>  /  Alb  3.0<L>  /  TBili  0.3  /  DBili  x   /  AST  11  /  ALT  8   /  AlkPhos  84  03-27      Urinalysis Basic - ( 27 Mar 2024 07:09 )    Color: x / Appearance: x / SG: x / pH: x  Gluc: 81 mg/dL / Ketone: x  / Bili: x / Urobili: x   Blood: x / Protein: x / Nitrite: x   Leuk Esterase: x / RBC: x / WBC x   Sq Epi: x / Non Sq Epi: x / Bacteria: x                RADIOLOGY:    PHYSICAL EXAM:  GEN: No acute distress  LUNGS: Clear to auscultation bilaterally   HEART: S1/S2 present. RRR.   ABD/ GI: Soft, non-tender, non-distended. Bowel sounds present  EXT: NC/NC/NE/2+PP/COHNE  NEURO: AAOX0, nonverbal

## 2024-03-27 NOTE — PROGRESS NOTE ADULT - ASSESSMENT
58F w/ PMHx Down Syndrome, nonverbal at baseline, Hypothyroidism, Cerebral palsy and Seizure Disorders presents to the ED from nursing facility/group home (\[facility]) presented to ED for respiratory distress and high grade fever. Patient found to be septic on admission. Admitted to SDU for management of acute respiratory distress 2/2 CAP/Aspiration PNA (20 Jan 2024 18:22)  While pt was on the floor she developed dyspnea; consulted by pulmonary/critical care and was upgraded back to SDU 3/14. During CEU, she was tapered off of HFNC and complete course of antibiotics. She was stable for downgrade on 3/17.     # Sepsis POA / Acute hypoxic resp failure / RSV bronchiolitis /  H/o Dysphagia/ suspected aspiration    - continue midodrine 20 Q8H  - completed caspo (end 3/10) per ID   - Failed FEES, currently on NG tube; needs PEG  - gi consulted for PEG , hunter jhaveri ([phone]) for consent   - c/w albuterol  - on RA today  - sp meropenem 1g q8h IV and  levaquin 750mg q24h; finished 3/20     #  Elevated Troponin , type 2 MI/  Sinus tachycardia  - c/w telemetry monitoring   - Repeat EKG: Normal sinus rhythm  - sp metoprolol  - TTE 2/19 normal EF no DD or valve abnormalities    # Seizure Disorder  - c/w  Keppra   - seizure precautions  - keep Mg above 2.0     # H/o lower ext DVT   - c/w therapeutic Lovenox, Eliquis on dc    #  Hypothyroidism  - TSH 0.51    # Normocytic Anemia, anemia of chronic disease   - monitor H/H, keep Hb above 7.5     # Down Syndrome/  Cerebral Palsy/ functional quadriplegia   - supportive care  - prevent falls and aspiration   - failed speech and swallow, needs PEG as above  - gi consulted for peg, consent needed from advisory board number in chart   - on Raloxifene     Pending: PEG placement; decision and consent from advisory board for PEG placement

## 2024-03-28 LAB
ALBUMIN SERPL ELPH-MCNC: 3 G/DL — LOW (ref 3.5–5.2)
ALP SERPL-CCNC: 96 U/L — SIGNIFICANT CHANGE UP (ref 30–115)
ALT FLD-CCNC: 9 U/L — SIGNIFICANT CHANGE UP (ref 0–41)
ANION GAP SERPL CALC-SCNC: 10 MMOL/L — SIGNIFICANT CHANGE UP (ref 7–14)
AST SERPL-CCNC: 11 U/L — SIGNIFICANT CHANGE UP (ref 0–41)
BASOPHILS # BLD AUTO: 0.1 K/UL — SIGNIFICANT CHANGE UP (ref 0–0.2)
BASOPHILS NFR BLD AUTO: 1 % — SIGNIFICANT CHANGE UP (ref 0–1)
BILIRUB SERPL-MCNC: 0.2 MG/DL — SIGNIFICANT CHANGE UP (ref 0.2–1.2)
BUN SERPL-MCNC: 12 MG/DL — SIGNIFICANT CHANGE UP (ref 10–20)
CALCIUM SERPL-MCNC: 8.7 MG/DL — SIGNIFICANT CHANGE UP (ref 8.4–10.5)
CHLORIDE SERPL-SCNC: 101 MMOL/L — SIGNIFICANT CHANGE UP (ref 98–110)
CO2 SERPL-SCNC: 28 MMOL/L — SIGNIFICANT CHANGE UP (ref 17–32)
CREAT SERPL-MCNC: 0.5 MG/DL — LOW (ref 0.7–1.5)
EGFR: 109 ML/MIN/1.73M2 — SIGNIFICANT CHANGE UP
EOSINOPHIL # BLD AUTO: 0.32 K/UL — SIGNIFICANT CHANGE UP (ref 0–0.7)
EOSINOPHIL NFR BLD AUTO: 3.1 % — SIGNIFICANT CHANGE UP (ref 0–8)
GLUCOSE BLDC GLUCOMTR-MCNC: 115 MG/DL — HIGH (ref 70–99)
GLUCOSE BLDC GLUCOMTR-MCNC: 123 MG/DL — HIGH (ref 70–99)
GLUCOSE BLDC GLUCOMTR-MCNC: 135 MG/DL — HIGH (ref 70–99)
GLUCOSE BLDC GLUCOMTR-MCNC: 136 MG/DL — HIGH (ref 70–99)
GLUCOSE SERPL-MCNC: 172 MG/DL — HIGH (ref 70–99)
HCT VFR BLD CALC: 25.8 % — LOW (ref 37–47)
HGB BLD-MCNC: 7.8 G/DL — LOW (ref 12–16)
IMM GRANULOCYTES NFR BLD AUTO: 1.3 % — HIGH (ref 0.1–0.3)
LYMPHOCYTES # BLD AUTO: 1.63 K/UL — SIGNIFICANT CHANGE UP (ref 1.2–3.4)
LYMPHOCYTES # BLD AUTO: 16 % — LOW (ref 20.5–51.1)
MAGNESIUM SERPL-MCNC: 2.1 MG/DL — SIGNIFICANT CHANGE UP (ref 1.8–2.4)
MCHC RBC-ENTMCNC: 24.5 PG — LOW (ref 27–31)
MCHC RBC-ENTMCNC: 30.2 G/DL — LOW (ref 32–37)
MCV RBC AUTO: 80.9 FL — LOW (ref 81–99)
MONOCYTES # BLD AUTO: 0.51 K/UL — SIGNIFICANT CHANGE UP (ref 0.1–0.6)
MONOCYTES NFR BLD AUTO: 5 % — SIGNIFICANT CHANGE UP (ref 1.7–9.3)
NEUTROPHILS # BLD AUTO: 7.52 K/UL — HIGH (ref 1.4–6.5)
NEUTROPHILS NFR BLD AUTO: 73.6 % — SIGNIFICANT CHANGE UP (ref 42.2–75.2)
NRBC # BLD: 0 /100 WBCS — SIGNIFICANT CHANGE UP (ref 0–0)
PLATELET # BLD AUTO: 437 K/UL — HIGH (ref 130–400)
PMV BLD: 10.3 FL — SIGNIFICANT CHANGE UP (ref 7.4–10.4)
POTASSIUM SERPL-MCNC: 4.6 MMOL/L — SIGNIFICANT CHANGE UP (ref 3.5–5)
POTASSIUM SERPL-SCNC: 4.6 MMOL/L — SIGNIFICANT CHANGE UP (ref 3.5–5)
PROT SERPL-MCNC: 5.9 G/DL — LOW (ref 6–8)
RBC # BLD: 3.19 M/UL — LOW (ref 4.2–5.4)
RBC # FLD: 22 % — HIGH (ref 11.5–14.5)
SODIUM SERPL-SCNC: 139 MMOL/L — SIGNIFICANT CHANGE UP (ref 135–146)
WBC # BLD: 10.21 K/UL — SIGNIFICANT CHANGE UP (ref 4.8–10.8)
WBC # FLD AUTO: 10.21 K/UL — SIGNIFICANT CHANGE UP (ref 4.8–10.8)

## 2024-03-28 PROCEDURE — 99232 SBSQ HOSP IP/OBS MODERATE 35: CPT

## 2024-03-28 RX ORDER — ENOXAPARIN SODIUM 100 MG/ML
50 INJECTION SUBCUTANEOUS EVERY 12 HOURS
Refills: 0 | Status: DISCONTINUED | OUTPATIENT
Start: 2024-03-28 | End: 2024-03-28

## 2024-03-28 RX ORDER — ENOXAPARIN SODIUM 100 MG/ML
50 INJECTION SUBCUTANEOUS ONCE
Refills: 0 | Status: DISCONTINUED | OUTPATIENT
Start: 2024-03-28 | End: 2024-03-28

## 2024-03-28 RX ORDER — ENOXAPARIN SODIUM 100 MG/ML
50 INJECTION SUBCUTANEOUS ONCE
Refills: 0 | Status: COMPLETED | OUTPATIENT
Start: 2024-03-28 | End: 2024-03-28

## 2024-03-28 RX ADMIN — Medication 2 SPRAY(S): at 22:12

## 2024-03-28 RX ADMIN — ENOXAPARIN SODIUM 50 MILLIGRAM(S): 100 INJECTION SUBCUTANEOUS at 13:54

## 2024-03-28 RX ADMIN — Medication 1 APPLICATION(S): at 18:32

## 2024-03-28 RX ADMIN — ALBUTEROL 2.5 MILLIGRAM(S): 90 AEROSOL, METERED ORAL at 08:32

## 2024-03-28 RX ADMIN — ALBUTEROL 2.5 MILLIGRAM(S): 90 AEROSOL, METERED ORAL at 21:21

## 2024-03-28 RX ADMIN — MIDODRINE HYDROCHLORIDE 20 MILLIGRAM(S): 2.5 TABLET ORAL at 13:57

## 2024-03-28 RX ADMIN — Medication 1 DROP(S): at 06:02

## 2024-03-28 RX ADMIN — SENNA PLUS 2 TABLET(S): 8.6 TABLET ORAL at 22:10

## 2024-03-28 RX ADMIN — MIDODRINE HYDROCHLORIDE 20 MILLIGRAM(S): 2.5 TABLET ORAL at 18:27

## 2024-03-28 RX ADMIN — Medication 1 APPLICATION(S): at 06:11

## 2024-03-28 RX ADMIN — Medication 1 APPLICATION(S): at 13:56

## 2024-03-28 RX ADMIN — ENOXAPARIN SODIUM 50 MILLIGRAM(S): 100 INJECTION SUBCUTANEOUS at 22:10

## 2024-03-28 RX ADMIN — Medication 2 SPRAY(S): at 14:00

## 2024-03-28 RX ADMIN — CHLORHEXIDINE GLUCONATE 1 APPLICATION(S): 213 SOLUTION TOPICAL at 13:56

## 2024-03-28 RX ADMIN — Medication 2 SPRAY(S): at 06:12

## 2024-03-28 RX ADMIN — Medication 4 MILLILITER(S): at 21:23

## 2024-03-28 RX ADMIN — Medication 4 MILLILITER(S): at 14:38

## 2024-03-28 RX ADMIN — MIDODRINE HYDROCHLORIDE 20 MILLIGRAM(S): 2.5 TABLET ORAL at 06:03

## 2024-03-28 RX ADMIN — GABAPENTIN 300 MILLIGRAM(S): 400 CAPSULE ORAL at 06:03

## 2024-03-28 RX ADMIN — Medication 4 MILLILITER(S): at 08:33

## 2024-03-28 RX ADMIN — LEVETIRACETAM 500 MILLIGRAM(S): 250 TABLET, FILM COATED ORAL at 06:58

## 2024-03-28 RX ADMIN — POLYETHYLENE GLYCOL 3350 17 GRAM(S): 17 POWDER, FOR SOLUTION ORAL at 22:09

## 2024-03-28 RX ADMIN — Medication 3 MILLIGRAM(S): at 22:10

## 2024-03-28 RX ADMIN — Medication 250 MILLIGRAM(S): at 18:32

## 2024-03-28 RX ADMIN — LEVETIRACETAM 500 MILLIGRAM(S): 250 TABLET, FILM COATED ORAL at 18:26

## 2024-03-28 RX ADMIN — ALBUTEROL 2.5 MILLIGRAM(S): 90 AEROSOL, METERED ORAL at 14:37

## 2024-03-28 RX ADMIN — IRON SUCROSE 110 MILLIGRAM(S): 20 INJECTION, SOLUTION INTRAVENOUS at 13:57

## 2024-03-28 RX ADMIN — PANTOPRAZOLE SODIUM 40 MILLIGRAM(S): 20 TABLET, DELAYED RELEASE ORAL at 06:03

## 2024-03-28 RX ADMIN — Medication 1 DROP(S): at 18:33

## 2024-03-28 RX ADMIN — Medication 250 MILLIGRAM(S): at 06:12

## 2024-03-28 RX ADMIN — GABAPENTIN 300 MILLIGRAM(S): 400 CAPSULE ORAL at 18:27

## 2024-03-28 RX ADMIN — RALOXIFENE HYDROCHLORIDE 60 MILLIGRAM(S): 60 TABLET, COATED ORAL at 13:55

## 2024-03-28 NOTE — PROGRESS NOTE ADULT - SUBJECTIVE AND OBJECTIVE BOX
24H events:    Patient is a 58y old Female who presents with a chief complaint of Respiratory Distress (27 Mar 2024 12:35)    Primary diagnosis of Sepsis with acute hypoxic respiratory failure    Today is hospital day 68d. This morning patient was seen and examined at bedside, resting comfortably in bed.    No acute or major events overnight      PAST MEDICAL & SURGICAL HISTORY  Down syndrome    Osteoporosis    Mild anemia    Neuropathy    S/P debridement  of R hip on 3/2/21      SOCIAL HISTORY:  Social History:      ALLERGIES:  No Known Allergies    MEDICATIONS:  STANDING MEDICATIONS  acetylcysteine 20%  Inhalation 4 milliLiter(s) Inhalation every 6 hours  albuterol    0.083% 2.5 milliGRAM(s) Nebulizer every 6 hours  albuterol    90 MICROgram(s) HFA Inhaler 1 Puff(s) Inhalation every 4 hours  AQUAPHOR (petrolatum Ointment) 1 Application(s) Topical two times a day  artificial  tears Solution 1 Drop(s) Both EYES two times a day  chlorhexidine 2% Cloths 1 Application(s) Topical daily  enoxaparin Injectable 50 milliGRAM(s) SubCutaneous every 12 hours  erythromycin   Ointment 1 Application(s) Both EYES daily  gabapentin 300 milliGRAM(s) Oral every 12 hours  iron sucrose IVPB 200 milliGRAM(s) IV Intermittent every 24 hours  levETIRAcetam  Solution 500 milliGRAM(s) Oral two times a day  melatonin 3 milliGRAM(s) Oral at bedtime  midodrine. 20 milliGRAM(s) Oral three times a day  pantoprazole  Injectable 40 milliGRAM(s) IV Push every 24 hours  polyethylene glycol 3350 17 Gram(s) Oral at bedtime  raloxifene 60 milliGRAM(s) Oral daily  saccharomyces boulardii 250 milliGRAM(s) Oral two times a day  senna 2 Tablet(s) Oral at bedtime  sodium chloride 0.65% Nasal 2 Spray(s) Both Nostrils three times a day    PRN MEDICATIONS  acetaminophen     Tablet .. 650 milliGRAM(s) Oral every 8 hours PRN  aluminum hydroxide/magnesium hydroxide/simethicone Suspension 30 milliLiter(s) Oral every 4 hours PRN  ondansetron Injectable 4 milliGRAM(s) IV Push every 8 hours PRN    VITALS:   T(F): 97.6  HR: 104  BP: 122/58  RR: 18  SpO2: 99%    PHYSICAL EXAM:  GENERAL:   ( x ) NAD, lying in bed comfortably     (  ) obtunded     (  ) lethargic     (  ) somnolent    HEAD:   (  ) Atraumatic     (  ) hematoma     (  ) laceration (specify location:       )     NECK:  (  ) Supple     (  ) neck stiffness     (  ) nuchal rigidity     (  )  no JVD     (  ) JVD present ( -- cm)    HEART:  Rate -->     ( x ) normal rate     (  ) bradycardic     (  ) tachycardic  Rhythm -->     (  ) regular     (  ) regularly irregular     (  ) irregularly irregular  Murmurs -->     (  ) normal s1s2     ( x ) systolic murmur     (  ) diastolic murmur     (  ) continuous murmur      (  ) S3 present     (  ) S4 present    LUNGS:   ( x )Unlabored respirations     (  ) tachypnea  ( x ) B/L air entry     (  ) decreased breath sounds in:  (location     )    ( x ) no adventitious sound     (  ) crackles     (  ) wheezing      (  ) rhonchi      (specify location:       )  (  ) chest wall tenderness (specify location:       )    ABDOMEN:   ( x ) Soft     (  ) tense   |   ( x ) nondistended     (  ) distended   |   (  ) +BS     (  ) hypoactive bowel sounds     (  ) hyperactive bowel sounds  ( x ) nontender     (  ) RUQ tenderness     (  ) RLQ tenderness     (  ) LLQ tenderness     (  ) epigastric tenderness     (  ) diffuse tenderness  (  ) Splenomegaly      (  ) Hepatomegaly      (  ) Jaundice     (  ) ecchymosis     EXTREMITIES:  ( x ) Normal     (  ) Rash     (  ) ecchymosis     (  ) varicose veins      (  ) pitting edema     (  ) non-pitting edema   (  ) ulceration     (  ) gangrene:     (location:     )    NERVOUS SYSTEM:    ( x ) A&Ox0     (  ) confused     (  ) lethargic  CN II-XII:     (  ) Intact     (  ) deficits found     (Specify:     )   Upper extremities:     (  ) no sensorimotor deficits     (  ) weakness     (  ) loss of proprioception/vibration     (  ) loss of touch/temperature (specify:    )  Lower extremities:     (  ) no sensorimotor deficits     (  ) weakness     (  ) loss of proprioception/vibration     (  ) loss of touch/temperature (specify:    )    SKIN:   (  ) No rashes or lesions     (  ) maculopapular rash     (  ) pustules     (  ) vesicles     (  ) ulcer     (  ) ecchymosis     (specify location:     )    LABS:                        7.8    10.21 )-----------( 437      ( 28 Mar 2024 06:48 )             25.8     03-28    139  |  101  |  12  ----------------------------<  172<H>  4.6   |  28  |  0.5<L>    Ca    8.7      28 Mar 2024 06:48  Mg     2.1     03-28    TPro  5.9<L>  /  Alb  3.0<L>  /  TBili  0.2  /  DBili  x   /  AST  11  /  ALT  9   /  AlkPhos  96  03-28      Urinalysis Basic - ( 28 Mar 2024 06:48 )    Color: x / Appearance: x / SG: x / pH: x  Gluc: 172 mg/dL / Ketone: x  / Bili: x / Urobili: x   Blood: x / Protein: x / Nitrite: x   Leuk Esterase: x / RBC: x / WBC x   Sq Epi: x / Non Sq Epi: x / Bacteria: x                RADIOLOGY:

## 2024-03-28 NOTE — PROGRESS NOTE ADULT - ASSESSMENT
58F w/ PMHx Down Syndrome, nonverbal at baseline, Hypothyroidism, Cerebral palsy and Seizure Disorders presents to the ED from nursing facility/group home ([facility]) presented to ED for respiratory distress and high grade fever. Patient found to be septic on admission. Admitted to SDU for management of acute respiratory distress 2/2 CAP/Aspiration PNA (20 Jan 2024 18:22)  While pt was on the floor she developed dyspnea  consulted by pulmonary/critical care and was upgraded back to SDU.    A/P    # dysphagia- plan for PEG-- consent obtained  hold lovenox prior to PEG on NG tube feeding  consent from advisory board-obtained   office phone number is [phone]    # Sepsis POA - improved  - Acute hypoxic resp failure - multifactorial RSV bronchiolitis on admission; then concern for possible gram negative pneumonia and suspected aspiration  - Staph epidermidis bacteremia in 3/14  - staph hominis  on presentation -  contaminant  repeat cultures 3/15- NGTD  ID following- s/p danna and levaquin for 7 days till 3/20; and vanc 5 days from time of negative cultures- completed  - midline removed  fungitell -81; no further intervention; recheck level in 1 week  - s/p steroid course  - currently on nasal cannula 2L; -  wean down on oxygen as tolerated    # Seizure Disorder  - c/w  Keppra - change to solution  - seizure precautions  - keep Mg above 2.0     # H/o lower ext DVT   - c/w therapeutic Lovenox, Eliquis on dc    #  Hypothyroidism  - TSH 0.51    # microcytic Anemia, anemia of chronic disease   - monitor H/H,  ferritin- 281, transferring saturation <6%; possible iron deficiency- started on venofer-- day 4    #  Elevated Troponin , type 2 MI/  Sinus tachycardia- c/w  metoprolol  - TTE 2/19 normal EF no DD or valve abnormalities    # h/o duodenal ulceration  continue PPI bid; change to daily after EGD/PEG if stable    # Down Syndrome/  Cerebral Palsy/ functional quadriplegia   - supportive care  - prevent falls and aspiration   - failed speech and swallow, might  need  PEG ( see above)  - on Raloxifene     # multiple pressure ulcers- continue wound care per recommendation from wound care; d/w nursing staff    # hypotension- on midodrine; continue    pending consent for PEG

## 2024-03-28 NOTE — PROGRESS NOTE ADULT - ASSESSMENT
58F w/ PMHx Down Syndrome, nonverbal at baseline, Hypothyroidism, Cerebral palsy and Seizure Disorders [facility] presented to ED for respiratory distress and high grade fever. Patient found to be septic on admission. Admitted to SDU for management of acute respiratory distress 2/2 CAP/Aspiration PNA (20 Jan 2024 18:22)  While pt was on the floor she developed dyspnea; consulted by pulmonary/critical care and was upgraded back to SDU 3/14. During CEU, she was tapered off of HFNC and complete course of antibiotics. She was stable for downgrade on 3/17.     # Sepsis POA / Acute hypoxic resp failure / RSV bronchiolitis /  H/o Dysphagia/ suspected aspiration    - continue midodrine 20 Q8H  - completed caspo (end 3/10) per ID   - Failed FEES, currently on NG tube; needs PEG  - gi consulted for PEG , hunter jhaveri ([phone]) for consent   - c/w albuterol  - on RA today  - sp meropenem 1g q8h IV and  levaquin 750mg q24h; finished 3/20     #  Elevated Troponin , type 2 MI/  Sinus tachycardia  - c/w telemetry monitoring   - Repeat EKG: Normal sinus rhythm  - sp metoprolol  - TTE 2/19 normal EF no DD or valve abnormalities    # Seizure Disorder  - c/w  Keppra   - seizure precautions  - keep Mg above 2.0     # H/o lower ext DVT   - c/w therapeutic Lovenox, Eliquis on dc    #  Hypothyroidism  - TSH 0.51    # Normocytic Anemia, anemia of chronic disease   - monitor H/H, keep Hb above 7.5     # Down Syndrome/  Cerebral Palsy/ functional quadriplegia   - supportive care  - prevent falls and aspiration   - failed speech and swallow, needs PEG as above  - gi consulted for peg, consent needed from advisory board number in chart   - on Raloxifene     Pending: PEG placement; decision and consent from advisory board for PEG placement

## 2024-03-28 NOTE — PROGRESS NOTE ADULT - SUBJECTIVE AND OBJECTIVE BOX
SUBJECTIVE:    Patient is a 58y old Female who presents with a chief complaint of Respiratory Distress (28 Mar 2024 10:31)    Currently admitted to medicine with the primary diagnosis of Sepsis with acute hypoxic respiratory failure       Today is hospital day 68d.     PAST MEDICAL & SURGICAL HISTORY  Down syndrome    Osteoporosis    Mild anemia    Neuropathy    S/P debridement  of R hip on 3/2/21      ALLERGIES:  No Known Allergies    MEDICATIONS:  STANDING MEDICATIONS  acetylcysteine 20%  Inhalation 4 milliLiter(s) Inhalation every 6 hours  albuterol    0.083% 2.5 milliGRAM(s) Nebulizer every 6 hours  albuterol    90 MICROgram(s) HFA Inhaler 1 Puff(s) Inhalation every 4 hours  AQUAPHOR (petrolatum Ointment) 1 Application(s) Topical two times a day  artificial  tears Solution 1 Drop(s) Both EYES two times a day  chlorhexidine 2% Cloths 1 Application(s) Topical daily  enoxaparin Injectable 50 milliGRAM(s) SubCutaneous every 12 hours  erythromycin   Ointment 1 Application(s) Both EYES daily  gabapentin 300 milliGRAM(s) Oral every 12 hours  levETIRAcetam  Solution 500 milliGRAM(s) Oral two times a day  melatonin 3 milliGRAM(s) Oral at bedtime  midodrine. 20 milliGRAM(s) Oral three times a day  pantoprazole  Injectable 40 milliGRAM(s) IV Push every 24 hours  polyethylene glycol 3350 17 Gram(s) Oral at bedtime  raloxifene 60 milliGRAM(s) Oral daily  saccharomyces boulardii 250 milliGRAM(s) Oral two times a day  senna 2 Tablet(s) Oral at bedtime  sodium chloride 0.65% Nasal 2 Spray(s) Both Nostrils three times a day    PRN MEDICATIONS  acetaminophen     Tablet .. 650 milliGRAM(s) Oral every 8 hours PRN  aluminum hydroxide/magnesium hydroxide/simethicone Suspension 30 milliLiter(s) Oral every 4 hours PRN  ondansetron Injectable 4 milliGRAM(s) IV Push every 8 hours PRN    VITALS:   T(F): 97.6  HR: 104  BP: 122/58  RR: 18  SpO2: 99%    LABS:                        7.8    10.21 )-----------( 437      ( 28 Mar 2024 06:48 )             25.8     03-28    139  |  101  |  12  ----------------------------<  172<H>  4.6   |  28  |  0.5<L>    Ca    8.7      28 Mar 2024 06:48  Mg     2.1     03-28    TPro  5.9<L>  /  Alb  3.0<L>  /  TBili  0.2  /  DBili  x   /  AST  11  /  ALT  9   /  AlkPhos  96  03-28      Urinalysis Basic - ( 28 Mar 2024 06:48 )    Color: x / Appearance: x / SG: x / pH: x  Gluc: 172 mg/dL / Ketone: x  / Bili: x / Urobili: x   Blood: x / Protein: x / Nitrite: x   Leuk Esterase: x / RBC: x / WBC x   Sq Epi: x / Non Sq Epi: x / Bacteria: x                RADIOLOGY:    PHYSICAL EXAM:  GEN: No acute distress  LUNGS: Clear to auscultation bilaterally   HEART: S1/S2 present. RRR.   ABD/ GI: Soft, non-tender, non-distended. Bowel sounds present  EXT: NC/NC/NE/2+PP/COHEN  NEURO: AAOX3

## 2024-03-29 LAB
ALBUMIN SERPL ELPH-MCNC: 3.4 G/DL — LOW (ref 3.5–5.2)
ALP SERPL-CCNC: 99 U/L — SIGNIFICANT CHANGE UP (ref 30–115)
ALT FLD-CCNC: 10 U/L — SIGNIFICANT CHANGE UP (ref 0–41)
ANION GAP SERPL CALC-SCNC: 13 MMOL/L — SIGNIFICANT CHANGE UP (ref 7–14)
APTT BLD: 37.3 SEC — SIGNIFICANT CHANGE UP (ref 27–39.2)
AST SERPL-CCNC: 15 U/L — SIGNIFICANT CHANGE UP (ref 0–41)
BASOPHILS # BLD AUTO: 0.15 K/UL — SIGNIFICANT CHANGE UP (ref 0–0.2)
BASOPHILS NFR BLD AUTO: 1.4 % — HIGH (ref 0–1)
BILIRUB SERPL-MCNC: 0.3 MG/DL — SIGNIFICANT CHANGE UP (ref 0.2–1.2)
BUN SERPL-MCNC: 10 MG/DL — SIGNIFICANT CHANGE UP (ref 10–20)
CALCIUM SERPL-MCNC: 9.5 MG/DL — SIGNIFICANT CHANGE UP (ref 8.4–10.4)
CHLORIDE SERPL-SCNC: 99 MMOL/L — SIGNIFICANT CHANGE UP (ref 98–110)
CO2 SERPL-SCNC: 26 MMOL/L — SIGNIFICANT CHANGE UP (ref 17–32)
CREAT SERPL-MCNC: 0.6 MG/DL — LOW (ref 0.7–1.5)
EGFR: 105 ML/MIN/1.73M2 — SIGNIFICANT CHANGE UP
EOSINOPHIL # BLD AUTO: 0.3 K/UL — SIGNIFICANT CHANGE UP (ref 0–0.7)
EOSINOPHIL NFR BLD AUTO: 2.8 % — SIGNIFICANT CHANGE UP (ref 0–8)
GLUCOSE BLDC GLUCOMTR-MCNC: 93 MG/DL — SIGNIFICANT CHANGE UP (ref 70–99)
GLUCOSE BLDC GLUCOMTR-MCNC: 94 MG/DL — SIGNIFICANT CHANGE UP (ref 70–99)
GLUCOSE SERPL-MCNC: 101 MG/DL — HIGH (ref 70–99)
HCT VFR BLD CALC: 30.2 % — LOW (ref 37–47)
HGB BLD-MCNC: 9 G/DL — LOW (ref 12–16)
IMM GRANULOCYTES NFR BLD AUTO: 1.1 % — HIGH (ref 0.1–0.3)
INR BLD: 1.07 RATIO — SIGNIFICANT CHANGE UP (ref 0.65–1.3)
LYMPHOCYTES # BLD AUTO: 19.9 % — LOW (ref 20.5–51.1)
LYMPHOCYTES # BLD AUTO: 2.14 K/UL — SIGNIFICANT CHANGE UP (ref 1.2–3.4)
MAGNESIUM SERPL-MCNC: 2.4 MG/DL — SIGNIFICANT CHANGE UP (ref 1.8–2.4)
MCHC RBC-ENTMCNC: 24.9 PG — LOW (ref 27–31)
MCHC RBC-ENTMCNC: 29.8 G/DL — LOW (ref 32–37)
MCV RBC AUTO: 83.7 FL — SIGNIFICANT CHANGE UP (ref 81–99)
MONOCYTES # BLD AUTO: 0.6 K/UL — SIGNIFICANT CHANGE UP (ref 0.1–0.6)
MONOCYTES NFR BLD AUTO: 5.6 % — SIGNIFICANT CHANGE UP (ref 1.7–9.3)
NEUTROPHILS # BLD AUTO: 7.43 K/UL — HIGH (ref 1.4–6.5)
NEUTROPHILS NFR BLD AUTO: 69.2 % — SIGNIFICANT CHANGE UP (ref 42.2–75.2)
NRBC # BLD: 0 /100 WBCS — SIGNIFICANT CHANGE UP (ref 0–0)
PLATELET # BLD AUTO: 478 K/UL — HIGH (ref 130–400)
PMV BLD: 10.1 FL — SIGNIFICANT CHANGE UP (ref 7.4–10.4)
POTASSIUM SERPL-MCNC: 4.9 MMOL/L — SIGNIFICANT CHANGE UP (ref 3.5–5)
POTASSIUM SERPL-SCNC: 4.9 MMOL/L — SIGNIFICANT CHANGE UP (ref 3.5–5)
PROT SERPL-MCNC: 6.5 G/DL — SIGNIFICANT CHANGE UP (ref 6–8)
PROTHROM AB SERPL-ACNC: 12.2 SEC — SIGNIFICANT CHANGE UP (ref 9.95–12.87)
RBC # BLD: 3.61 M/UL — LOW (ref 4.2–5.4)
RBC # FLD: 23.4 % — HIGH (ref 11.5–14.5)
SODIUM SERPL-SCNC: 138 MMOL/L — SIGNIFICANT CHANGE UP (ref 135–146)
WBC # BLD: 10.74 K/UL — SIGNIFICANT CHANGE UP (ref 4.8–10.8)
WBC # FLD AUTO: 10.74 K/UL — SIGNIFICANT CHANGE UP (ref 4.8–10.8)

## 2024-03-29 PROCEDURE — 99232 SBSQ HOSP IP/OBS MODERATE 35: CPT

## 2024-03-29 PROCEDURE — 71045 X-RAY EXAM CHEST 1 VIEW: CPT | Mod: 26

## 2024-03-29 RX ORDER — ENOXAPARIN SODIUM 100 MG/ML
50 INJECTION SUBCUTANEOUS EVERY 12 HOURS
Refills: 0 | Status: DISCONTINUED | OUTPATIENT
Start: 2024-03-29 | End: 2024-04-01

## 2024-03-29 RX ADMIN — Medication 4 MILLILITER(S): at 08:17

## 2024-03-29 RX ADMIN — Medication 2 SPRAY(S): at 15:35

## 2024-03-29 RX ADMIN — MIDODRINE HYDROCHLORIDE 20 MILLIGRAM(S): 2.5 TABLET ORAL at 17:51

## 2024-03-29 RX ADMIN — POLYETHYLENE GLYCOL 3350 17 GRAM(S): 17 POWDER, FOR SOLUTION ORAL at 21:51

## 2024-03-29 RX ADMIN — ENOXAPARIN SODIUM 50 MILLIGRAM(S): 100 INJECTION SUBCUTANEOUS at 17:50

## 2024-03-29 RX ADMIN — GABAPENTIN 300 MILLIGRAM(S): 400 CAPSULE ORAL at 05:37

## 2024-03-29 RX ADMIN — Medication 1 APPLICATION(S): at 17:54

## 2024-03-29 RX ADMIN — Medication 1 APPLICATION(S): at 07:37

## 2024-03-29 RX ADMIN — Medication 3 MILLIGRAM(S): at 21:45

## 2024-03-29 RX ADMIN — Medication 1 DROP(S): at 17:54

## 2024-03-29 RX ADMIN — Medication 250 MILLIGRAM(S): at 05:48

## 2024-03-29 RX ADMIN — GABAPENTIN 300 MILLIGRAM(S): 400 CAPSULE ORAL at 17:49

## 2024-03-29 RX ADMIN — SENNA PLUS 2 TABLET(S): 8.6 TABLET ORAL at 21:46

## 2024-03-29 RX ADMIN — ALBUTEROL 2.5 MILLIGRAM(S): 90 AEROSOL, METERED ORAL at 21:05

## 2024-03-29 RX ADMIN — Medication 2 SPRAY(S): at 21:50

## 2024-03-29 RX ADMIN — LEVETIRACETAM 500 MILLIGRAM(S): 250 TABLET, FILM COATED ORAL at 05:36

## 2024-03-29 RX ADMIN — Medication 1 DROP(S): at 05:48

## 2024-03-29 RX ADMIN — ALBUTEROL 2.5 MILLIGRAM(S): 90 AEROSOL, METERED ORAL at 08:18

## 2024-03-29 RX ADMIN — Medication 4 MILLILITER(S): at 21:05

## 2024-03-29 RX ADMIN — Medication 250 MILLIGRAM(S): at 17:52

## 2024-03-29 RX ADMIN — PANTOPRAZOLE SODIUM 40 MILLIGRAM(S): 20 TABLET, DELAYED RELEASE ORAL at 05:38

## 2024-03-29 RX ADMIN — LEVETIRACETAM 500 MILLIGRAM(S): 250 TABLET, FILM COATED ORAL at 17:49

## 2024-03-29 RX ADMIN — Medication 2 SPRAY(S): at 05:47

## 2024-03-29 RX ADMIN — MIDODRINE HYDROCHLORIDE 20 MILLIGRAM(S): 2.5 TABLET ORAL at 05:37

## 2024-03-29 NOTE — ADVANCED PRACTICE NURSE CONSULT - RECOMMEDATIONS
Cleanse bilateral gluteal folds with soap and water.   Pat dry apply triad twice a day and prn for soiling.   Maintain pressure injury prevention.   Keep skin clean.   Maintain incontinence care.   Monitor wound for changes and notify provider 
Cleanse sacrococcygeal region with soap and water.   Pat dry, continue to apply Aquaphor as ordered twice a day and prn for soiling.   Maintain pressure injury prevention.   Keep skin clean.   Maintain incontinence care.   Monitor wound for changes and notify provider   Case discussed with primary RN
Continue with current treatment recommendations:    Cleanse bilateral gluteal folds with soap and water.   Pat dry apply triad and Allevyn twice a day and prn for soiling.   Maintain pressure injury prevention.   Keep skin clean.   Maintain incontinence care.   Monitor wound for changes and notify provider   Case discussed with RN

## 2024-03-29 NOTE — PROGRESS NOTE ADULT - SUBJECTIVE AND OBJECTIVE BOX
24H events:    Patient is a 58y old Female who presents with a chief complaint of Respiratory Distress (28 Mar 2024 15:41)    Primary diagnosis of Sepsis with acute hypoxic respiratory failure      Today is hospital day 69d. This morning patient was seen and examined at bedside, resting comfortably in bed.    No acute or major events overnight.    PAST MEDICAL & SURGICAL HISTORY  Down syndrome    Osteoporosis    Mild anemia    Neuropathy    S/P debridement  of R hip on 3/2/21      SOCIAL HISTORY:  Social History:      ALLERGIES:  No Known Allergies    MEDICATIONS:  STANDING MEDICATIONS  acetylcysteine 20%  Inhalation 4 milliLiter(s) Inhalation every 6 hours  albuterol    0.083% 2.5 milliGRAM(s) Nebulizer every 6 hours  albuterol    90 MICROgram(s) HFA Inhaler 1 Puff(s) Inhalation every 4 hours  AQUAPHOR (petrolatum Ointment) 1 Application(s) Topical two times a day  artificial  tears Solution 1 Drop(s) Both EYES two times a day  chlorhexidine 2% Cloths 1 Application(s) Topical daily  erythromycin   Ointment 1 Application(s) Both EYES daily  gabapentin 300 milliGRAM(s) Oral every 12 hours  levETIRAcetam  Solution 500 milliGRAM(s) Oral two times a day  melatonin 3 milliGRAM(s) Oral at bedtime  midodrine. 20 milliGRAM(s) Oral three times a day  pantoprazole  Injectable 40 milliGRAM(s) IV Push every 24 hours  polyethylene glycol 3350 17 Gram(s) Oral at bedtime  raloxifene 60 milliGRAM(s) Oral daily  saccharomyces boulardii 250 milliGRAM(s) Oral two times a day  senna 2 Tablet(s) Oral at bedtime  sodium chloride 0.65% Nasal 2 Spray(s) Both Nostrils three times a day    PRN MEDICATIONS  acetaminophen     Tablet .. 650 milliGRAM(s) Oral every 8 hours PRN  aluminum hydroxide/magnesium hydroxide/simethicone Suspension 30 milliLiter(s) Oral every 4 hours PRN  ondansetron Injectable 4 milliGRAM(s) IV Push every 8 hours PRN    VITALS:   T(F): 96.9  HR: 55  BP: 101/52  RR: 18  SpO2: 95%    PHYSICAL EXAM:  GENERAL:   ( x ) NAD, lying in bed comfortably     (  ) obtunded     (  ) lethargic     (  ) somnolent    HEAD:   (  ) Atraumatic     (  ) hematoma     (  ) laceration (specify location:       )     NECK:  (  ) Supple     (  ) neck stiffness     (  ) nuchal rigidity     (  )  no JVD     (  ) JVD present ( -- cm)    HEART:  Rate -->     ( x ) normal rate     (  ) bradycardic     (  ) tachycardic  Rhythm -->     (  ) regular     (  ) regularly irregular     (  ) irregularly irregular  Murmurs -->     (  ) normal s1s2     ( x ) systolic murmur     (  ) diastolic murmur     (  ) continuous murmur      (  ) S3 present     (  ) S4 present    LUNGS:   ( x )Unlabored respirations     (  ) tachypnea  ( x ) B/L air entry     (  ) decreased breath sounds in:  (location     )    ( x ) no adventitious sound     (  ) crackles     (  ) wheezing      (  ) rhonchi      (specify location:       )  (  ) chest wall tenderness (specify location:       )    ABDOMEN:   ( x ) Soft     (  ) tense   |   ( x ) nondistended     (  ) distended   |   (  ) +BS     (  ) hypoactive bowel sounds     (  ) hyperactive bowel sounds  ( x ) nontender     (  ) RUQ tenderness     (  ) RLQ tenderness     (  ) LLQ tenderness     (  ) epigastric tenderness     (  ) diffuse tenderness  (  ) Splenomegaly      (  ) Hepatomegaly      (  ) Jaundice     (  ) ecchymosis     EXTREMITIES:  ( x ) Normal     (  ) Rash     (  ) ecchymosis     (  ) varicose veins      (  ) pitting edema     (  ) non-pitting edema   (  ) ulceration     (  ) gangrene:     (location:     )    NERVOUS SYSTEM:    ( x ) A&Ox0     (  ) confused     (  ) lethargic  CN II-XII:     (  ) Intact     (  ) deficits found     (Specify:     )   Upper extremities:     (  ) no sensorimotor deficits     (  ) weakness     (  ) loss of proprioception/vibration     (  ) loss of touch/temperature (specify:    )  Lower extremities:     (  ) no sensorimotor deficits     (  ) weakness     (  ) loss of proprioception/vibration     (  ) loss of touch/temperature (specify:    )    SKIN:   (  ) No rashes or lesions     (  ) maculopapular rash     (  ) pustules     (  ) vesicles     (  ) ulcer     (  ) ecchymosis     (specify location:     )    LABS:                        9.0    10.74 )-----------( 478      ( 29 Mar 2024 08:12 )             30.2     03-29    138  |  99  |  10  ----------------------------<  101<H>  4.9   |  26  |  0.6<L>    Ca    9.5      29 Mar 2024 08:12  Mg     2.4     03-29    TPro  6.5  /  Alb  3.4<L>  /  TBili  0.3  /  DBili  x   /  AST  15  /  ALT  10  /  AlkPhos  99  03-29      Urinalysis Basic - ( 29 Mar 2024 08:12 )    Color: x / Appearance: x / SG: x / pH: x  Gluc: 101 mg/dL / Ketone: x  / Bili: x / Urobili: x   Blood: x / Protein: x / Nitrite: x   Leuk Esterase: x / RBC: x / WBC x   Sq Epi: x / Non Sq Epi: x / Bacteria: x                RADIOLOGY:

## 2024-03-29 NOTE — CHART NOTE - NSCHARTNOTEFT_GEN_A_CORE
Patient came to Endo and was noted to be persistently hypotensive despite fluid boluses. BP 70/44.     PEG procedure is cancelled and pt will return to floor. Please recall GI once pt is medically optimzed

## 2024-03-29 NOTE — PROGRESS NOTE ADULT - ASSESSMENT
58F w/ PMHx Down Syndrome, nonverbal at baseline, Hypothyroidism, Cerebral palsy and Seizure Disorders [facility] presented to ED for respiratory distress and high grade fever. Patient found to be septic on admission. Admitted to SDU for management of acute respiratory distress 2/2 CAP/Aspiration PNA (20 Jan 2024 18:22)  While pt was on the floor she developed dyspnea; consulted by pulmonary/critical care and was upgraded back to SDU 3/14. During CEU, she was tapered off of HFNC and complete course of antibiotics. She was stable for downgrade on 3/17.     # Sepsis POA / Acute hypoxic resp failure / RSV bronchiolitis /  H/o Dysphagia/ suspected aspiration    - continue midodrine 20 Q8H  - completed caspo (end 3/10) per ID   - Failed FEES, currently on NG tube; needs PEG  - gi consulted for PEG , hunter jhaveri ([phone]) for consent - consent obtained, GI planning for PEG tube  - c/w albuterol  - on RA today  - sp meropenem 1g q8h IV and  levaquin 750mg q24h; finished 3/20     #  Elevated Troponin , type 2 MI/  Sinus tachycardia  - c/w telemetry monitoring   - Repeat EKG: Normal sinus rhythm  - sp metoprolol  - TTE 2/19 normal EF no DD or valve abnormalities    # Seizure Disorder  - c/w  Keppra   - seizure precautions  - keep Mg above 2.0     # H/o lower ext DVT   - c/w therapeutic Lovenox, Eliquis on dc    #  Hypothyroidism  - TSH 0.51    # Normocytic Anemia, anemia of chronic disease   - monitor H/H, keep Hb above 7.5     # Down Syndrome/  Cerebral Palsy/ functional quadriplegia   - supportive care  - prevent falls and aspiration   - failed speech and swallow, needs PEG as above  - gi consulted for peg, consent obtained  - on Raloxifene     Pending: PEG placement, NPO, hold AC   58F w/ PMHx Down Syndrome, nonverbal at baseline, Hypothyroidism, Cerebral palsy and Seizure Disorders presents to the ED from nursing facility[facility]group home [facility]) presented to ED for respiratory distress and high grade fever. Patient found to be septic on admission. Admitted to SDU for management of acute respiratory distress 2/2 CAP/Aspiration PNA (20 Jan 2024 18:22)  While pt was on the floor she developed dyspnea; consulted by pulmonary/critical care and was upgraded back to SDU 3/14. During CEU, she was tapered off of HFNC and complete course of antibiotics. She was stable for downgrade on 3/17.     # Sepsis POA / Acute hypoxic resp failure / RSV bronchiolitis /  H/o Dysphagia/ suspected aspiration    - continue midodrine 20 Q8H  - completed caspo (end 3/10) per ID   - Failed FEES, currently on NG tube; needs PEG  - gi consulted for PEG , hunter jhaveri ([phone]) for consent - consent obtained, GI planning for PEG tube  - c/w albuterol  - on RA today  - sp meropenem 1g q8h IV and  levaquin 750mg q24h; finished 3/20     #  Elevated Troponin , type 2 MI/  Sinus tachycardia  - c/w telemetry monitoring   - Repeat EKG: Normal sinus rhythm  - sp metoprolol  - TTE 2/19 normal EF no DD or valve abnormalities    # Seizure Disorder  - c/w  Keppra   - seizure precautions  - keep Mg above 2.0     # H/o lower ext DVT   - c/w therapeutic Lovenox, Eliquis on dc    #  Hypothyroidism  - TSH 0.51    # Normocytic Anemia, anemia of chronic disease   - monitor H/H, keep Hb above 7.5     # Down Syndrome/  Cerebral Palsy/ functional quadriplegia   - supportive care  - prevent falls and aspiration   - failed speech and swallow, needs PEG as above  - gi consulted for peg, consent obtained  - on Raloxifene     Pending: PEG placement, NPO, hold AC

## 2024-03-29 NOTE — PROGRESS NOTE ADULT - ASSESSMENT
58F w/ PMHx Down Syndrome, nonverbal at baseline, Hypothyroidism, Cerebral palsy and Seizure Disorders presents to the ED from nursing facility/group home ([facility]) presented to ED for respiratory distress and high grade fever. Patient found to be septic on admission. Admitted to SDU for management of acute respiratory distress 2/2 CAP/Aspiration PNA (20 Jan 2024 18:22)  While pt was on the floor she developed dyspnea  consulted by pulmonary/critical care and was upgraded back to SDU.    A/P    # dysphagia- plan for PEG-- consent obtained   NG tube feeding-- PEG could not be placed due to low BP  consent from advisory board-obtained   office phone number is [phone]    # Sepsis POA - improved  - Acute hypoxic resp failure - multifactorial RSV bronchiolitis on admission; then concern for possible gram negative pneumonia and suspected aspiration  - Staph epidermidis bacteremia in 3/14  - staph hominis  on presentation -  contaminant  repeat cultures 3/15- NGTD  ID following- s/p danna and levaquin for 7 days till 3/20; and vanc 5 days from time of negative cultures- completed  - midline removed  fungitell -81; no further intervention; recheck level in 1 week  - s/p steroid course  - currently on nasal cannula 2L; -  wean down on oxygen as tolerated    # Seizure Disorder  - c/w  Keppra - change to solution  - seizure precautions  - keep Mg above 2.0     # H/o lower ext DVT   - c/w therapeutic Lovenox, Eliquis on dc    #  Hypothyroidism  - TSH 0.51    # microcytic Anemia, anemia of chronic disease   - monitor H/H, hb stable today  ferritin- 281, transferring saturation <6%; possible iron deficiency- started on venofer-- day 4    #  Elevated Troponin , type 2 MI/  Sinus tachycardia- c/w  metoprolol  - TTE 2/19 normal EF no DD or valve abnormalities    # h/o duodenal ulceration  continue PPI bid; change to daily after EGD/PEG if stable    # Down Syndrome/  Cerebral Palsy/ functional quadriplegia   - supportive care  - prevent falls and aspiration   - failed speech and swallow, might  need  PEG ( see above)  - on Raloxifene     # multiple pressure ulcers- continue wound care per recommendation from wound care; d/w nursing staff    # hypotension- on midodrine; BP low-- not septic-- labs are stable -- monitor vitals    pending PEG for next week as BP low today-- does not seem septic-- check CXR

## 2024-03-29 NOTE — ADVANCED PRACTICE NURSE CONSULT - REASON FOR CONSULT
Pressure Injury Assessment and Treatment Recommendation

## 2024-03-29 NOTE — CHART NOTE - NSCHARTNOTEFT_GEN_A_CORE
Registered Dietitian Follow-Up     Patient Profile Reviewed                           Yes [x]   No []     Nutrition History Previously Obtained        Yes [x]  No []       Pertinent Subjective Information: Patient currently receiving EN Jevity 1.2 120 mL q6H (480 mL daily) + 1 No Carb ProSource daily +50 mL free water pre/post feeds provides 636 kcal, 42 g protein, 387 mL free water from formula (787 free water total)    Pertinent Medical Interventions:  Per internal medicine note:   # Sepsis POA / Acute hypoxic resp failure / RSV bronchiolitis /  H/o Dysphagia/ suspected aspiration    - gi consulted for PEG , hunter jhaveri ([phone]) for consent - consent obtained, GI planning for PEG tube    # Down Syndrome/  Cerebral Palsy/ functional quadriplegia   - supportive care  - prevent falls and aspiration   - failed speech and swallow, needs PEG as above  - gi consulted for peg, consent obtained  - on Raloxifene     Anthropometrics:  Height (cm): 136.4 (03-22-24 @ 02:34)  Weight (kg): 52.3 (03-22-24 @ 02:34), weights have been fluctuating throughout admission, recommend obtaining new weight.   BMI (kg/m2): 28.1 (03-22-24 @ 02:34)  IBW: 29.5 kg       MEDICATIONS  (STANDING):  acetylcysteine 20%  Inhalation 4 milliLiter(s) Inhalation every 6 hours  albuterol    0.083% 2.5 milliGRAM(s) Nebulizer every 6 hours  albuterol    90 MICROgram(s) HFA Inhaler 1 Puff(s) Inhalation every 4 hours  AQUAPHOR (petrolatum Ointment) 1 Application(s) Topical two times a day  artificial  tears Solution 1 Drop(s) Both EYES two times a day  chlorhexidine 2% Cloths 1 Application(s) Topical daily  erythromycin   Ointment 1 Application(s) Both EYES daily  gabapentin 300 milliGRAM(s) Oral every 12 hours  levETIRAcetam  Solution 500 milliGRAM(s) Oral two times a day  melatonin 3 milliGRAM(s) Oral at bedtime  midodrine. 20 milliGRAM(s) Oral three times a day  pantoprazole  Injectable 40 milliGRAM(s) IV Push every 24 hours  polyethylene glycol 3350 17 Gram(s) Oral at bedtime  raloxifene 60 milliGRAM(s) Oral daily  saccharomyces boulardii 250 milliGRAM(s) Oral two times a day  senna 2 Tablet(s) Oral at bedtime  sodium chloride 0.65% Nasal 2 Spray(s) Both Nostrils three times a day    MEDICATIONS  (PRN):  acetaminophen     Tablet .. 650 milliGRAM(s) Oral every 8 hours PRN Temp greater or equal to 38.5C (101.3F)  aluminum hydroxide/magnesium hydroxide/simethicone Suspension 30 milliLiter(s) Oral every 4 hours PRN Dyspepsia  ondansetron Injectable 4 milliGRAM(s) IV Push every 8 hours PRN Nausea and/or Vomiting    3/29: Creatinine: 0,6, glucose: 101    Physical Findings:  - Appearance: non verbal; disoriented x4  - GI function: fecal incontinence   - Tubes: +NGT  - Oral/Mouth cavity: NPO per SLP  - Skin: R buttock blister, R heel unstageable, L lateral foot stage 2   - Edema: no edema noted    Nutrition Requirements  Weight Used: 52.3kg -Derived from nutrition note (2/6)      Estimated Energy Needs    Continue [x]  Adjust []  Adjusted Energy Recommendations: 1307-1493kcal/day (MSJ x 1.4-1.6)     Estimated Protein Needs    Continue [x]  Adjust []  Adjusted Protein Recommendations: 68-84gm/day (1.3-1.5 gm/kg)      Estimated Fluid Needs        Continue [x]  Adjust []  Adjusted Fluid Recommendations: 1308-1569mL/day (25-30mL/kg)     Nutrient Intake: Jevity 1.2 120 mL q6H (480 mL daily) + 1 No Carb ProSource daily +50 mL free water pre/post feeds provides 636 kcal, 42 g protein, 387 mL free water from formula (787 free water total); current infusion meeting </= 50% estimated energy/protein needs     [x] Previous Nutrition Diagnosis: Increased Nutrient Needs            [x] Ongoing          [] Resolved     Nutrition Education: not appropriate at this time      Goal/Expected Outcome: Patient will meet >/= 75% nutrition needs via Enteral Nutrition in 3-5 days      Indicator/Monitoring: EN intake/tolerance, GI function, Nutrition Labs, NFPF, Weights     Recommendation: Advance EN regimen with Jevity 1.2 as able to goal of 300 mL q6H + No carb Prosource 1x daily.  This would provide: 1200 mL total volume, 1500 kcal, 81.6 gm Protein, 972 mL free water from formula + recommend 50 mL pres/post feeds + 30 mL pre/post prosource administration = 1432 mL total water     Continue to follow at high nutrition risk; f/u 3-5 days

## 2024-03-29 NOTE — PROGRESS NOTE ADULT - SUBJECTIVE AND OBJECTIVE BOX
SUBJECTIVE:    Patient is a 58y old Female who presents with a chief complaint of Respiratory Distress (29 Mar 2024 10:17)    Currently admitted to medicine with the primary diagnosis of Sepsis with acute hypoxic respiratory failure       Today is hospital day 69d.     PAST MEDICAL & SURGICAL HISTORY  Down syndrome    Osteoporosis    Mild anemia    Neuropathy    S/P debridement  of R hip on 3/2/21      ALLERGIES:  No Known Allergies    MEDICATIONS:  STANDING MEDICATIONS  acetylcysteine 20%  Inhalation 4 milliLiter(s) Inhalation every 6 hours  albuterol    0.083% 2.5 milliGRAM(s) Nebulizer every 6 hours  albuterol    90 MICROgram(s) HFA Inhaler 1 Puff(s) Inhalation every 4 hours  AQUAPHOR (petrolatum Ointment) 1 Application(s) Topical two times a day  artificial  tears Solution 1 Drop(s) Both EYES two times a day  chlorhexidine 2% Cloths 1 Application(s) Topical daily  enoxaparin Injectable 50 milliGRAM(s) SubCutaneous every 12 hours  erythromycin   Ointment 1 Application(s) Both EYES daily  gabapentin 300 milliGRAM(s) Oral every 12 hours  levETIRAcetam  Solution 500 milliGRAM(s) Oral two times a day  melatonin 3 milliGRAM(s) Oral at bedtime  midodrine. 20 milliGRAM(s) Oral three times a day  pantoprazole  Injectable 40 milliGRAM(s) IV Push every 24 hours  polyethylene glycol 3350 17 Gram(s) Oral at bedtime  raloxifene 60 milliGRAM(s) Oral daily  saccharomyces boulardii 250 milliGRAM(s) Oral two times a day  senna 2 Tablet(s) Oral at bedtime  sodium chloride 0.65% Nasal 2 Spray(s) Both Nostrils three times a day    PRN MEDICATIONS  acetaminophen     Tablet .. 650 milliGRAM(s) Oral every 8 hours PRN  aluminum hydroxide/magnesium hydroxide/simethicone Suspension 30 milliLiter(s) Oral every 4 hours PRN  ondansetron Injectable 4 milliGRAM(s) IV Push every 8 hours PRN    VITALS:   T(F): 97.7  HR: 64  BP: 91/55  RR: 18  SpO2: 95%    LABS:                        9.0    10.74 )-----------( 478      ( 29 Mar 2024 08:12 )             30.2     03-29    138  |  99  |  10  ----------------------------<  101<H>  4.9   |  26  |  0.6<L>    Ca    9.5      29 Mar 2024 08:12  Mg     2.4     03-29    TPro  6.5  /  Alb  3.4<L>  /  TBili  0.3  /  DBili  x   /  AST  15  /  ALT  10  /  AlkPhos  99  03-29    PT/INR - ( 29 Mar 2024 12:01 )   PT: 12.20 sec;   INR: 1.07 ratio         PTT - ( 29 Mar 2024 12:01 )  PTT:37.3 sec  Urinalysis Basic - ( 29 Mar 2024 08:12 )    Color: x / Appearance: x / SG: x / pH: x  Gluc: 101 mg/dL / Ketone: x  / Bili: x / Urobili: x   Blood: x / Protein: x / Nitrite: x   Leuk Esterase: x / RBC: x / WBC x   Sq Epi: x / Non Sq Epi: x / Bacteria: x                RADIOLOGY:    PHYSICAL EXAM:  GEN: No acute distress  LUNGS: Clear to auscultation bilaterally   HEART: S1/S2 present. RRR.   ABD/ GI: Soft, non-tender, non-distended. Bowel sounds present  EXT: NC/NC/NE/2+PP/COHEN  NEURO: AAOX0

## 2024-03-29 NOTE — ADVANCED PRACTICE NURSE CONSULT - ASSESSMENT
History of Present Illness:   58F w/ PMHx Down Syndrome, nonverbal at baseline, Hypothyroidism, Cerebral palsy and Seizure Disorders presents to the ED from nursing facility/group home ([facility]) presented to ED for respiratory distress and high grade fever. Patient found to be septic on admission. As per isaias Savage at bedside, patient had been coughing and congested for 3x days. Denies fevers, chills, n/v/d or pain prior to admission. Patient is on a puree diet at baseline.  Admitted to SDU for management of acute respiratory distress 2/2 CAP/Aspiration PNA    Allergies and Intolerances:        Allergies:  	No Known Allergies:     Home Medications:   * Patient Currently Takes Medications as of 20-Nov-2023 12:27 documented in Structured Notes  · 	ocular lubricant ophthalmic solution: 1 drop(s) to each affected eye 2 times a day  · 	midodrine 10 mg oral tablet: 1 tab(s) orally 3 times a day Please discontinue 5mg TID dose, and start 10mg TID dose  · 	levETIRAcetam 500 mg oral tablet: 1 tab(s) orally 2 times a day  · 	apixaban 5 mg oral tablet: 1 tab(s) orally every 12 hours  · 	Protonix 40 mg oral delayed release tablet: Last Dose Taken:  , 1 tab(s) orally once a day (after a meal)  · 	gabapentin 300 mg oral tablet: Last Dose Taken:  , 1 cap(s) orally every 12 hours  · 	Oyster Shell 500 (1250 mg calcium carbonate) oral tablet: Last Dose Taken:  , 1 tab(s) orally 2 times a day  · 	docusate sodium 100 mg oral capsule: Last Dose Taken:  , 1 cap(s) orally 2 times a day  · 	Melatonin 3 mg oral tablet: 1 tab(s) orally once a day (at bedtime)  · 	raloxifene 60 mg oral tablet: Last Dose Taken:  , 1 tab(s) orally once a day    Referred to picture in chart from 1/21/2024. Limited mobility. Incontinent of urine and stool High risk for pressure injury .    Type of Wound: Friction Injuries  Location: Bilateral gluteal folds  Wound bed: Pink partial thickness loss  Wound edges: Irregular  Periwound: Intact. Fissure to gluteal cleft    
History of Present Illness:   58F w/ PMHx Down Syndrome, nonverbal at baseline, Hypothyroidism, Cerebral palsy and Seizure Disorders presents to the ED from nursing facility/group home ([facility]) presented to ED for respiratory distress and high grade fever. Patient found to be septic on admission. As per isaias Savage at bedside, patient had been coughing and congested for 3x days. Denies fevers, chills, n/v/d or pain prior to admission. Patient is on a puree diet at baseline.  Admitted to SDU for management of acute respiratory distress 2/2 CAP/Aspiration PNA    Allergies and Intolerances:        Allergies:  	No Known Allergies:     Home Medications:   * Patient Currently Takes Medications as of 20-Nov-2023 12:27 documented in Structured Notes  · 	ocular lubricant ophthalmic solution: 1 drop(s) to each affected eye 2 times a day  · 	midodrine 10 mg oral tablet: 1 tab(s) orally 3 times a day Please discontinue 5mg TID dose, and start 10mg TID dose  · 	levETIRAcetam 500 mg oral tablet: 1 tab(s) orally 2 times a day  · 	apixaban 5 mg oral tablet: 1 tab(s) orally every 12 hours  · 	Protonix 40 mg oral delayed release tablet: Last Dose Taken:  , 1 tab(s) orally once a day (after a meal)  · 	gabapentin 300 mg oral tablet: Last Dose Taken:  , 1 cap(s) orally every 12 hours  · 	Oyster Shell 500 (1250 mg calcium carbonate) oral tablet: Last Dose Taken:  , 1 tab(s) orally 2 times a day  · 	docusate sodium 100 mg oral capsule: Last Dose Taken:  , 1 cap(s) orally 2 times a day  · 	Melatonin 3 mg oral tablet: 1 tab(s) orally once a day (at bedtime)  · 	raloxifene 60 mg oral tablet: Last Dose Taken:  , 1 tab(s) orally once a day    Patient received lying in bed. Alert and awake. Limited mobility. Incontinent of urine and stool. High risk for pressure injury.    Friction injuries to bilateral buttocks healed at time of assessment.     Dry flaky skin to sacrococcygeal region.     Podiatry following for foot wounds.       
History of Present Illness:   58F w/ PMHx Down Syndrome, nonverbal at baseline, Hypothyroidism, Cerebral palsy and Seizure Disorders presents to the ED from nursing facility/group home ([facility]) presented to ED for respiratory distress and high grade fever. Patient found to be septic on admission. As per isaias Savage at bedside, patient had been coughing and congested for 3x days. Denies fevers, chills, n/v/d or pain prior to admission. Patient is on a puree diet at baseline.  Admitted to SDU for management of acute respiratory distress 2/2 CAP/Aspiration PNA    Allergies and Intolerances:        Allergies:  	No Known Allergies:     Home Medications:   * Patient Currently Takes Medications as of 20-Nov-2023 12:27 documented in Structured Notes  · 	ocular lubricant ophthalmic solution: 1 drop(s) to each affected eye 2 times a day  · 	midodrine 10 mg oral tablet: 1 tab(s) orally 3 times a day Please discontinue 5mg TID dose, and start 10mg TID dose  · 	levETIRAcetam 500 mg oral tablet: 1 tab(s) orally 2 times a day  · 	apixaban 5 mg oral tablet: 1 tab(s) orally every 12 hours  · 	Protonix 40 mg oral delayed release tablet: Last Dose Taken:  , 1 tab(s) orally once a day (after a meal)  · 	gabapentin 300 mg oral tablet: Last Dose Taken:  , 1 cap(s) orally every 12 hours  · 	Oyster Shell 500 (1250 mg calcium carbonate) oral tablet: Last Dose Taken:  , 1 tab(s) orally 2 times a day  · 	docusate sodium 100 mg oral capsule: Last Dose Taken:  , 1 cap(s) orally 2 times a day  · 	Melatonin 3 mg oral tablet: 1 tab(s) orally once a day (at bedtime)  · 	raloxifene 60 mg oral tablet: Last Dose Taken:  , 1 tab(s) orally once a day    Patient received lying in bed. Awake. Limited mobility. Incontinent of urine and stool. High risk for pressure injury.    Type of Wound: Friction Injuries Healing  Location: Bilateral gluteal folds  Tunneling/ Undermining: No  Wound bed: Light pink intact tissue with lichenification  Wound edges: Intact  Periwound: Intact  Wound exudate: None  Wound odor: No  Induration, erythema, warmth: No  Wound pain: No    Podiatry following for foot wounds. 
verbal instruction

## 2024-03-30 LAB
ALBUMIN SERPL ELPH-MCNC: 3.1 G/DL — LOW (ref 3.5–5.2)
ALP SERPL-CCNC: 100 U/L — SIGNIFICANT CHANGE UP (ref 30–115)
ALT FLD-CCNC: 10 U/L — SIGNIFICANT CHANGE UP (ref 0–41)
ANION GAP SERPL CALC-SCNC: 9 MMOL/L — SIGNIFICANT CHANGE UP (ref 7–14)
AST SERPL-CCNC: 14 U/L — SIGNIFICANT CHANGE UP (ref 0–41)
BASOPHILS # BLD AUTO: 0.1 K/UL — SIGNIFICANT CHANGE UP (ref 0–0.2)
BASOPHILS NFR BLD AUTO: 1.3 % — HIGH (ref 0–1)
BILIRUB SERPL-MCNC: 0.2 MG/DL — SIGNIFICANT CHANGE UP (ref 0.2–1.2)
BUN SERPL-MCNC: 14 MG/DL — SIGNIFICANT CHANGE UP (ref 10–20)
CALCIUM SERPL-MCNC: 8.8 MG/DL — SIGNIFICANT CHANGE UP (ref 8.4–10.5)
CHLORIDE SERPL-SCNC: 103 MMOL/L — SIGNIFICANT CHANGE UP (ref 98–110)
CO2 SERPL-SCNC: 28 MMOL/L — SIGNIFICANT CHANGE UP (ref 17–32)
CREAT SERPL-MCNC: 0.6 MG/DL — LOW (ref 0.7–1.5)
EGFR: 105 ML/MIN/1.73M2 — SIGNIFICANT CHANGE UP
EOSINOPHIL # BLD AUTO: 0.25 K/UL — SIGNIFICANT CHANGE UP (ref 0–0.7)
EOSINOPHIL NFR BLD AUTO: 3.2 % — SIGNIFICANT CHANGE UP (ref 0–8)
GLUCOSE SERPL-MCNC: 122 MG/DL — HIGH (ref 70–99)
HCT VFR BLD CALC: 27.7 % — LOW (ref 37–47)
HGB BLD-MCNC: 8.5 G/DL — LOW (ref 12–16)
IMM GRANULOCYTES NFR BLD AUTO: 0.8 % — HIGH (ref 0.1–0.3)
LYMPHOCYTES # BLD AUTO: 1.77 K/UL — SIGNIFICANT CHANGE UP (ref 1.2–3.4)
LYMPHOCYTES # BLD AUTO: 22.5 % — SIGNIFICANT CHANGE UP (ref 20.5–51.1)
MAGNESIUM SERPL-MCNC: 2.7 MG/DL — HIGH (ref 1.8–2.4)
MCHC RBC-ENTMCNC: 25.3 PG — LOW (ref 27–31)
MCHC RBC-ENTMCNC: 30.7 G/DL — LOW (ref 32–37)
MCV RBC AUTO: 82.4 FL — SIGNIFICANT CHANGE UP (ref 81–99)
MONOCYTES # BLD AUTO: 0.49 K/UL — SIGNIFICANT CHANGE UP (ref 0.1–0.6)
MONOCYTES NFR BLD AUTO: 6.2 % — SIGNIFICANT CHANGE UP (ref 1.7–9.3)
NEUTROPHILS # BLD AUTO: 5.18 K/UL — SIGNIFICANT CHANGE UP (ref 1.4–6.5)
NEUTROPHILS NFR BLD AUTO: 66 % — SIGNIFICANT CHANGE UP (ref 42.2–75.2)
NRBC # BLD: 0 /100 WBCS — SIGNIFICANT CHANGE UP (ref 0–0)
PLATELET # BLD AUTO: 428 K/UL — HIGH (ref 130–400)
PMV BLD: 10 FL — SIGNIFICANT CHANGE UP (ref 7.4–10.4)
POTASSIUM SERPL-MCNC: 4.3 MMOL/L — SIGNIFICANT CHANGE UP (ref 3.5–5)
POTASSIUM SERPL-SCNC: 4.3 MMOL/L — SIGNIFICANT CHANGE UP (ref 3.5–5)
PROT SERPL-MCNC: 6 G/DL — SIGNIFICANT CHANGE UP (ref 6–8)
RBC # BLD: 3.36 M/UL — LOW (ref 4.2–5.4)
RBC # FLD: 23.9 % — HIGH (ref 11.5–14.5)
SODIUM SERPL-SCNC: 140 MMOL/L — SIGNIFICANT CHANGE UP (ref 135–146)
WBC # BLD: 7.85 K/UL — SIGNIFICANT CHANGE UP (ref 4.8–10.8)
WBC # FLD AUTO: 7.85 K/UL — SIGNIFICANT CHANGE UP (ref 4.8–10.8)

## 2024-03-30 PROCEDURE — 99232 SBSQ HOSP IP/OBS MODERATE 35: CPT

## 2024-03-30 RX ADMIN — MIDODRINE HYDROCHLORIDE 20 MILLIGRAM(S): 2.5 TABLET ORAL at 17:50

## 2024-03-30 RX ADMIN — Medication 4 MILLILITER(S): at 20:18

## 2024-03-30 RX ADMIN — Medication 2 SPRAY(S): at 05:19

## 2024-03-30 RX ADMIN — RALOXIFENE HYDROCHLORIDE 60 MILLIGRAM(S): 60 TABLET, COATED ORAL at 13:51

## 2024-03-30 RX ADMIN — POLYETHYLENE GLYCOL 3350 17 GRAM(S): 17 POWDER, FOR SOLUTION ORAL at 21:30

## 2024-03-30 RX ADMIN — LEVETIRACETAM 500 MILLIGRAM(S): 250 TABLET, FILM COATED ORAL at 17:49

## 2024-03-30 RX ADMIN — Medication 1 DROP(S): at 17:49

## 2024-03-30 RX ADMIN — ALBUTEROL 2.5 MILLIGRAM(S): 90 AEROSOL, METERED ORAL at 20:18

## 2024-03-30 RX ADMIN — CHLORHEXIDINE GLUCONATE 1 APPLICATION(S): 213 SOLUTION TOPICAL at 13:52

## 2024-03-30 RX ADMIN — Medication 2 SPRAY(S): at 13:51

## 2024-03-30 RX ADMIN — MIDODRINE HYDROCHLORIDE 20 MILLIGRAM(S): 2.5 TABLET ORAL at 05:18

## 2024-03-30 RX ADMIN — Medication 250 MILLIGRAM(S): at 05:18

## 2024-03-30 RX ADMIN — Medication 2 SPRAY(S): at 21:30

## 2024-03-30 RX ADMIN — PANTOPRAZOLE SODIUM 40 MILLIGRAM(S): 20 TABLET, DELAYED RELEASE ORAL at 05:17

## 2024-03-30 RX ADMIN — Medication 1 APPLICATION(S): at 05:39

## 2024-03-30 RX ADMIN — Medication 1 DROP(S): at 05:19

## 2024-03-30 RX ADMIN — Medication 1 APPLICATION(S): at 17:51

## 2024-03-30 RX ADMIN — ALBUTEROL 2.5 MILLIGRAM(S): 90 AEROSOL, METERED ORAL at 07:43

## 2024-03-30 RX ADMIN — LEVETIRACETAM 500 MILLIGRAM(S): 250 TABLET, FILM COATED ORAL at 05:18

## 2024-03-30 RX ADMIN — ALBUTEROL 2.5 MILLIGRAM(S): 90 AEROSOL, METERED ORAL at 13:31

## 2024-03-30 RX ADMIN — Medication 3 MILLIGRAM(S): at 21:30

## 2024-03-30 RX ADMIN — Medication 4 MILLILITER(S): at 13:32

## 2024-03-30 RX ADMIN — Medication 4 MILLILITER(S): at 07:43

## 2024-03-30 RX ADMIN — GABAPENTIN 300 MILLIGRAM(S): 400 CAPSULE ORAL at 17:50

## 2024-03-30 RX ADMIN — MIDODRINE HYDROCHLORIDE 20 MILLIGRAM(S): 2.5 TABLET ORAL at 13:51

## 2024-03-30 RX ADMIN — Medication 1 APPLICATION(S): at 13:51

## 2024-03-30 RX ADMIN — ENOXAPARIN SODIUM 50 MILLIGRAM(S): 100 INJECTION SUBCUTANEOUS at 05:20

## 2024-03-30 RX ADMIN — GABAPENTIN 300 MILLIGRAM(S): 400 CAPSULE ORAL at 05:19

## 2024-03-30 RX ADMIN — Medication 250 MILLIGRAM(S): at 17:50

## 2024-03-30 RX ADMIN — ENOXAPARIN SODIUM 50 MILLIGRAM(S): 100 INJECTION SUBCUTANEOUS at 17:49

## 2024-03-30 NOTE — PROGRESS NOTE ADULT - SUBJECTIVE AND OBJECTIVE BOX
24H events:    Patient is a 58y old Female who presents with a chief complaint of Respiratory Distress (29 Mar 2024 15:59)    Primary diagnosis of Sepsis with acute hypoxic respiratory failure      Today is hospital day 70d. This morning patient was seen and examined at bedside, resting comfortably in bed.    No acute or major events overnight.    PAST MEDICAL & SURGICAL HISTORY  Down syndrome    Osteoporosis    Mild anemia    Neuropathy    S/P debridement  of R hip on 3/2/21      SOCIAL HISTORY:  Social History:      ALLERGIES:  No Known Allergies    MEDICATIONS:  STANDING MEDICATIONS  acetylcysteine 20%  Inhalation 4 milliLiter(s) Inhalation every 6 hours  albuterol    0.083% 2.5 milliGRAM(s) Nebulizer every 6 hours  albuterol    90 MICROgram(s) HFA Inhaler 1 Puff(s) Inhalation every 4 hours  AQUAPHOR (petrolatum Ointment) 1 Application(s) Topical two times a day  artificial  tears Solution 1 Drop(s) Both EYES two times a day  chlorhexidine 2% Cloths 1 Application(s) Topical daily  enoxaparin Injectable 50 milliGRAM(s) SubCutaneous every 12 hours  erythromycin   Ointment 1 Application(s) Both EYES daily  gabapentin 300 milliGRAM(s) Oral every 12 hours  levETIRAcetam  Solution 500 milliGRAM(s) Oral two times a day  melatonin 3 milliGRAM(s) Oral at bedtime  midodrine. 20 milliGRAM(s) Oral three times a day  pantoprazole  Injectable 40 milliGRAM(s) IV Push every 24 hours  polyethylene glycol 3350 17 Gram(s) Oral at bedtime  raloxifene 60 milliGRAM(s) Oral daily  saccharomyces boulardii 250 milliGRAM(s) Oral two times a day  senna 2 Tablet(s) Oral at bedtime  sodium chloride 0.65% Nasal 2 Spray(s) Both Nostrils three times a day    PRN MEDICATIONS  acetaminophen     Tablet .. 650 milliGRAM(s) Oral every 8 hours PRN  aluminum hydroxide/magnesium hydroxide/simethicone Suspension 30 milliLiter(s) Oral every 4 hours PRN  ondansetron Injectable 4 milliGRAM(s) IV Push every 8 hours PRN    VITALS:   T(F): 96.5  HR: 65  BP: 94/51  RR: 18  SpO2: 95%    PHYSICAL EXAM:  GENERAL:   ( x ) NAD, lying in bed comfortably     (  ) obtunded     (  ) lethargic     (  ) somnolent    HEAD:   (  ) Atraumatic     (  ) hematoma     (  ) laceration (specify location:       )     NECK:  (  ) Supple     (  ) neck stiffness     (  ) nuchal rigidity     (  )  no JVD     (  ) JVD present ( -- cm)    HEART:  Rate -->     ( x ) normal rate     (  ) bradycardic     (  ) tachycardic  Rhythm -->     (  ) regular     (  ) regularly irregular     (  ) irregularly irregular  Murmurs -->     (  ) normal s1s2     ( x ) systolic murmur     (  ) diastolic murmur     (  ) continuous murmur      (  ) S3 present     (  ) S4 present    LUNGS:   ( x )Unlabored respirations     (  ) tachypnea  ( x ) B/L air entry     (  ) decreased breath sounds in:  (location     )    ( x ) no adventitious sound     (  ) crackles     (  ) wheezing      (  ) rhonchi      (specify location:       )  (  ) chest wall tenderness (specify location:       )    ABDOMEN:   ( x ) Soft     (  ) tense   |   ( x ) nondistended     (  ) distended   |   (  ) +BS     (  ) hypoactive bowel sounds     (  ) hyperactive bowel sounds  ( x ) nontender     (  ) RUQ tenderness     (  ) RLQ tenderness     (  ) LLQ tenderness     (  ) epigastric tenderness     (  ) diffuse tenderness  (  ) Splenomegaly      (  ) Hepatomegaly      (  ) Jaundice     (  ) ecchymosis     EXTREMITIES:  ( x ) Normal     (  ) Rash     (  ) ecchymosis     (  ) varicose veins      (  ) pitting edema     (  ) non-pitting edema   (  ) ulceration     (  ) gangrene:     (location:     )    NERVOUS SYSTEM:    ( x ) A&Ox0     (  ) confused     (  ) lethargic  CN II-XII:     (  ) Intact     (  ) deficits found     (Specify:     )   Upper extremities:     (  ) no sensorimotor deficits     (  ) weakness     (  ) loss of proprioception/vibration     (  ) loss of touch/temperature (specify:    )  Lower extremities:     (  ) no sensorimotor deficits     (  ) weakness     (  ) loss of proprioception/vibration     (  ) loss of touch/temperature (specify:    )    SKIN:   (  ) No rashes or lesions     (  ) maculopapular rash     (  ) pustules     (  ) vesicles     (  ) ulcer     (  ) ecchymosis     (specify location:     )      LABS:                        9.0    10.74 )-----------( 478      ( 29 Mar 2024 08:12 )             30.2     03-29    138  |  99  |  10  ----------------------------<  101<H>  4.9   |  26  |  0.6<L>    Ca    9.5      29 Mar 2024 08:12  Mg     2.4     03-29    TPro  6.5  /  Alb  3.4<L>  /  TBili  0.3  /  DBili  x   /  AST  15  /  ALT  10  /  AlkPhos  99  03-29    PT/INR - ( 29 Mar 2024 12:01 )   PT: 12.20 sec;   INR: 1.07 ratio         PTT - ( 29 Mar 2024 12:01 )  PTT:37.3 sec  Urinalysis Basic - ( 29 Mar 2024 08:12 )    Color: x / Appearance: x / SG: x / pH: x  Gluc: 101 mg/dL / Ketone: x  / Bili: x / Urobili: x   Blood: x / Protein: x / Nitrite: x   Leuk Esterase: x / RBC: x / WBC x   Sq Epi: x / Non Sq Epi: x / Bacteria: x                RADIOLOGY:

## 2024-03-30 NOTE — PROGRESS NOTE ADULT - SUBJECTIVE AND OBJECTIVE BOX
SUBJECTIVE:    Patient is a 58y old Female who presents with a chief complaint of Respiratory Distress (30 Mar 2024 08:52)    Currently admitted to medicine with the primary diagnosis of Sepsis with acute hypoxic respiratory failure       Today is hospital day 70d.     PAST MEDICAL & SURGICAL HISTORY  Down syndrome    Osteoporosis    Mild anemia    Neuropathy    S/P debridement  of R hip on 3/2/21      ALLERGIES:  No Known Allergies    MEDICATIONS:  STANDING MEDICATIONS  acetylcysteine 20%  Inhalation 4 milliLiter(s) Inhalation every 6 hours  albuterol    0.083% 2.5 milliGRAM(s) Nebulizer every 6 hours  albuterol    90 MICROgram(s) HFA Inhaler 1 Puff(s) Inhalation every 4 hours  AQUAPHOR (petrolatum Ointment) 1 Application(s) Topical two times a day  artificial  tears Solution 1 Drop(s) Both EYES two times a day  chlorhexidine 2% Cloths 1 Application(s) Topical daily  enoxaparin Injectable 50 milliGRAM(s) SubCutaneous every 12 hours  erythromycin   Ointment 1 Application(s) Both EYES daily  gabapentin 300 milliGRAM(s) Oral every 12 hours  levETIRAcetam  Solution 500 milliGRAM(s) Oral two times a day  melatonin 3 milliGRAM(s) Oral at bedtime  midodrine. 20 milliGRAM(s) Oral three times a day  pantoprazole  Injectable 40 milliGRAM(s) IV Push every 24 hours  polyethylene glycol 3350 17 Gram(s) Oral at bedtime  raloxifene 60 milliGRAM(s) Oral daily  saccharomyces boulardii 250 milliGRAM(s) Oral two times a day  senna 2 Tablet(s) Oral at bedtime  sodium chloride 0.65% Nasal 2 Spray(s) Both Nostrils three times a day    PRN MEDICATIONS  acetaminophen     Tablet .. 650 milliGRAM(s) Oral every 8 hours PRN  aluminum hydroxide/magnesium hydroxide/simethicone Suspension 30 milliLiter(s) Oral every 4 hours PRN  ondansetron Injectable 4 milliGRAM(s) IV Push every 8 hours PRN    VITALS:   T(F): 96.5  HR: 65  BP: 94/51  RR: 18  SpO2: 95%    LABS:                        8.5    7.85  )-----------( 428      ( 30 Mar 2024 09:05 )             27.7     03-30    140  |  103  |  14  ----------------------------<  122<H>  4.3   |  28  |  0.6<L>    Ca    8.8      30 Mar 2024 09:05  Mg     2.7     03-30    TPro  6.0  /  Alb  3.1<L>  /  TBili  0.2  /  DBili  x   /  AST  14  /  ALT  10  /  AlkPhos  100  03-30    PT/INR - ( 29 Mar 2024 12:01 )   PT: 12.20 sec;   INR: 1.07 ratio         PTT - ( 29 Mar 2024 12:01 )  PTT:37.3 sec  Urinalysis Basic - ( 30 Mar 2024 09:05 )    Color: x / Appearance: x / SG: x / pH: x  Gluc: 122 mg/dL / Ketone: x  / Bili: x / Urobili: x   Blood: x / Protein: x / Nitrite: x   Leuk Esterase: x / RBC: x / WBC x   Sq Epi: x / Non Sq Epi: x / Bacteria: x                RADIOLOGY:    PHYSICAL EXAM:  GEN: No acute distress  LUNGS: Clear to auscultation bilaterally   HEART: S1/S2 present. RRR.   ABD/ GI: Soft, non-tender, non-distended. Bowel sounds present  EXT: NC/NC/NE/2+PP/COHEN  NEURO: Awake and responsive and he is nonverbal

## 2024-03-30 NOTE — PROGRESS NOTE ADULT - ASSESSMENT
58F w/ PMHx Down Syndrome, nonverbal at baseline, Hypothyroidism, Cerebral palsy and Seizure Disorders presents to the ED from nursing [facility] presented to ED for respiratory distress and high grade fever. Patient found to be septic on admission. Admitted to SDU for management of acute respiratory distress 2/2 CAP/Aspiration PNA (20 Jan 2024 18:22)  While pt was on the floor she developed dyspnea; consulted by pulmonary/critical care and was upgraded back to SDU 3/14. During CEU, she was tapered off of HFNC and complete course of antibiotics. She was stable for downgrade on 3/17.     # Sepsis POA / Acute hypoxic resp failure / RSV bronchiolitis /  H/o Dysphagia/ suspected aspiration    - continue midodrine 20 Q8H  - completed caspo (end 3/10) per ID   - Failed FEES, currently on NG tube; needs PEG  - gi consulted for PEG , hunter jhaveri ([phone]) for consent - consent obtained, GI planning for PEG tube  - c/w albuterol  - on RA today  - sp meropenem 1g q8h IV and  levaquin 750mg q24h; finished 3/20  - PEG tube was scheduled for 3/29 but canceled d/t hypotension     #  Elevated Troponin , type 2 MI/  Sinus tachycardia  - c/w telemetry monitoring   - Repeat EKG: Normal sinus rhythm  - sp metoprolol  - TTE 2/19 normal EF no DD or valve abnormalities    # Seizure Disorder  - c/w  Keppra   - seizure precautions  - keep Mg above 2.0     # H/o lower ext DVT   - c/w therapeutic Lovenox, Eliquis on dc    #  Hypothyroidism  - TSH 0.51    # Normocytic Anemia, anemia of chronic disease   - monitor H/H, keep Hb above 7.5     # Down Syndrome/  Cerebral Palsy/ functional quadriplegia   - supportive care  - prevent falls and aspiration   - failed speech and swallow, needs PEG as above  - gi consulted for peg, consent obtained  - on Raloxifene     Pending: PEG placement by GI (consent was obtained)

## 2024-03-30 NOTE — PROGRESS NOTE ADULT - ASSESSMENT
58F w/ PMHx Down Syndrome, nonverbal at baseline, Hypothyroidism, Cerebral palsy and Seizure Disorders presents to the ED from nursing facility/group home ([facility]) presented to ED for respiratory distress and high grade fever. Patient found to be septic on admission. Admitted to SDU for management of acute respiratory distress 2/2 CAP/Aspiration PNA (20 Jan 2024 18:22)  While pt was on the floor she developed dyspnea  consulted by pulmonary/critical care and was upgraded back to SDU.    A/P    # dysphagia- plan for PEG-- consent obtained   NG tube feeding-- PEG could not be placed due to low BP  consent from advisory board-obtained   office phone number is [phone]    # Sepsis POA - improved  - Acute hypoxic resp failure - multifactorial RSV bronchiolitis on admission; then concern for possible gram negative pneumonia and suspected aspiration  - Staph epidermidis bacteremia in 3/14  - staph hominis  on presentation -  contaminant  repeat cultures 3/15- NGTD  ID following- s/p danna and levaquin for 7 days till 3/20; and vanc 5 days from time of negative cultures- completed  - midline removed  fungitell -81; no further intervention; recheck level in 1 week  - s/p steroid course  - currently on nasal cannula 2L; -  wean down on oxygen as tolerated    # Seizure Disorder  - c/w  Keppra - change to solution  - seizure precautions  - keep Mg above 2.0     # H/o lower ext DVT   - c/w therapeutic Lovenox, Eliquis on dc    #  Hypothyroidism  - TSH 0.51    # microcytic Anemia, anemia of chronic disease   - monitor H/H, hb stable today  ferritin- 281, transferring saturation <6%; possible iron deficiency- started on venofer-- day 4    #  Elevated Troponin , type 2 MI/  Sinus tachycardia- c/w  metoprolol  - TTE 2/19 normal EF no DD or valve abnormalities    # h/o duodenal ulceration  continue PPI bid; change to daily after EGD/PEG if stable    # Down Syndrome/  Cerebral Palsy/ functional quadriplegia   - supportive care  - prevent falls and aspiration   - failed speech and swallow, might  need  PEG ( see above)  - on Raloxifene     # multiple pressure ulcers- continue wound care per recommendation from wound care; d/w nursing staff    # hypotension- on midodrine; BP low-- not septic-- labs are stable --   pending PEG for next week as BP baseline-- does not seem septic--  CXR normal  will request PEG again next week

## 2024-03-31 LAB
GLUCOSE BLDC GLUCOMTR-MCNC: 100 MG/DL — HIGH (ref 70–99)
GLUCOSE BLDC GLUCOMTR-MCNC: 127 MG/DL — HIGH (ref 70–99)
GLUCOSE BLDC GLUCOMTR-MCNC: 139 MG/DL — HIGH (ref 70–99)
GLUCOSE BLDC GLUCOMTR-MCNC: 142 MG/DL — HIGH (ref 70–99)
MAGNESIUM SERPL-MCNC: 2.3 MG/DL — SIGNIFICANT CHANGE UP (ref 1.8–2.4)

## 2024-03-31 PROCEDURE — 99232 SBSQ HOSP IP/OBS MODERATE 35: CPT

## 2024-03-31 RX ADMIN — Medication 2 SPRAY(S): at 05:28

## 2024-03-31 RX ADMIN — Medication 1 APPLICATION(S): at 17:56

## 2024-03-31 RX ADMIN — Medication 4 MILLILITER(S): at 13:25

## 2024-03-31 RX ADMIN — GABAPENTIN 300 MILLIGRAM(S): 400 CAPSULE ORAL at 05:29

## 2024-03-31 RX ADMIN — ALBUTEROL 2.5 MILLIGRAM(S): 90 AEROSOL, METERED ORAL at 08:14

## 2024-03-31 RX ADMIN — Medication 4 MILLILITER(S): at 20:45

## 2024-03-31 RX ADMIN — Medication 2 SPRAY(S): at 21:41

## 2024-03-31 RX ADMIN — MIDODRINE HYDROCHLORIDE 20 MILLIGRAM(S): 2.5 TABLET ORAL at 17:56

## 2024-03-31 RX ADMIN — Medication 1 APPLICATION(S): at 14:10

## 2024-03-31 RX ADMIN — Medication 1 APPLICATION(S): at 05:29

## 2024-03-31 RX ADMIN — ENOXAPARIN SODIUM 50 MILLIGRAM(S): 100 INJECTION SUBCUTANEOUS at 05:28

## 2024-03-31 RX ADMIN — Medication 1 DROP(S): at 05:28

## 2024-03-31 RX ADMIN — SENNA PLUS 2 TABLET(S): 8.6 TABLET ORAL at 21:42

## 2024-03-31 RX ADMIN — LEVETIRACETAM 500 MILLIGRAM(S): 250 TABLET, FILM COATED ORAL at 17:54

## 2024-03-31 RX ADMIN — PANTOPRAZOLE SODIUM 40 MILLIGRAM(S): 20 TABLET, DELAYED RELEASE ORAL at 05:29

## 2024-03-31 RX ADMIN — Medication 250 MILLIGRAM(S): at 05:29

## 2024-03-31 RX ADMIN — CHLORHEXIDINE GLUCONATE 1 APPLICATION(S): 213 SOLUTION TOPICAL at 14:03

## 2024-03-31 RX ADMIN — Medication 1 DROP(S): at 17:59

## 2024-03-31 RX ADMIN — ALBUTEROL 2.5 MILLIGRAM(S): 90 AEROSOL, METERED ORAL at 13:25

## 2024-03-31 RX ADMIN — RALOXIFENE HYDROCHLORIDE 60 MILLIGRAM(S): 60 TABLET, COATED ORAL at 14:04

## 2024-03-31 RX ADMIN — Medication 2 SPRAY(S): at 14:10

## 2024-03-31 RX ADMIN — LEVETIRACETAM 500 MILLIGRAM(S): 250 TABLET, FILM COATED ORAL at 05:28

## 2024-03-31 RX ADMIN — Medication 3 MILLIGRAM(S): at 21:42

## 2024-03-31 RX ADMIN — MIDODRINE HYDROCHLORIDE 20 MILLIGRAM(S): 2.5 TABLET ORAL at 05:29

## 2024-03-31 RX ADMIN — ENOXAPARIN SODIUM 50 MILLIGRAM(S): 100 INJECTION SUBCUTANEOUS at 17:59

## 2024-03-31 RX ADMIN — GABAPENTIN 300 MILLIGRAM(S): 400 CAPSULE ORAL at 17:55

## 2024-03-31 RX ADMIN — POLYETHYLENE GLYCOL 3350 17 GRAM(S): 17 POWDER, FOR SOLUTION ORAL at 21:41

## 2024-03-31 RX ADMIN — Medication 4 MILLILITER(S): at 08:14

## 2024-03-31 RX ADMIN — ALBUTEROL 2.5 MILLIGRAM(S): 90 AEROSOL, METERED ORAL at 20:46

## 2024-03-31 RX ADMIN — Medication 250 MILLIGRAM(S): at 17:55

## 2024-03-31 RX ADMIN — MIDODRINE HYDROCHLORIDE 20 MILLIGRAM(S): 2.5 TABLET ORAL at 14:04

## 2024-03-31 NOTE — PROGRESS NOTE ADULT - ASSESSMENT
58F w/ PMHx Down Syndrome, nonverbal at baseline, Hypothyroidism, Cerebral palsy and Seizure Disorders presents to the ED from nursing facility/group home ([facility]) presented to ED for respiratory distress and high grade fever. Patient found to be septic on admission. Admitted to SDU for management of acute respiratory distress 2/2 CAP/Aspiration PNA (20 Jan 2024 18:22)  While pt was on the floor she developed dyspnea  consulted by pulmonary/critical care and was upgraded back to SDU.    A/P    # dysphagia- plan for PEG-- consent obtained   NG tube feeding-- PEG could not be placed due to low BP-- will ask again for reschedule tuesday  consent from advisory board-obtained   office phone number is [phone]    # Sepsis POA - improved  - Acute hypoxic resp failure - multifactorial RSV bronchiolitis on admission; then concern for possible gram negative pneumonia and suspected aspiration  - Staph epidermidis bacteremia in 3/14  - staph hominis  on presentation -  contaminant  repeat cultures 3/15- NGTD  ID following- s/p danna and levaquin for 7 days till 3/20; and vanc 5 days from time of negative cultures- completed  - midline removed  fungitell -81; no further intervention; recheck level in 1 week  - s/p steroid course  - currently on nasal cannula 2L; -  wean down on oxygen as tolerated    # Seizure Disorder  - c/w  Keppra - change to solution  - seizure precautions  - keep Mg above 2.0     # H/o lower ext DVT   - c/w therapeutic Lovenox, Eliquis on dc    #  Hypothyroidism  - TSH 0.51    # microcytic Anemia, anemia of chronic disease   - monitor H/H, hb stable today  ferritin- 281, transferring saturation <6%; possible iron deficiency- started on venofer-- day 4    #  Elevated Troponin , type 2 MI/  Sinus tachycardia- c/w  metoprolol  - TTE 2/19 normal EF no DD or valve abnormalities    # h/o duodenal ulceration  continue PPI bid; change to daily after EGD/PEG if stable    # Down Syndrome/  Cerebral Palsy/ functional quadriplegia   - supportive care  - prevent falls and aspiration   - failed speech and swallow, might  need  PEG ( see above)  - on Raloxifene     # multiple pressure ulcers- continue wound care per recommendation from wound care; d/w nursing staff    # hypotension- on midodrine; BP low-- not septic-- labs are stable --   pending PEG for next week as BP baseline-- --  CXR normal

## 2024-03-31 NOTE — PROGRESS NOTE ADULT - SUBJECTIVE AND OBJECTIVE BOX
SUBJECTIVE:    Patient is a 58y old Female who presents with a chief complaint of Respiratory Distress (30 Mar 2024 12:24)    Currently admitted to medicine with the primary diagnosis of Sepsis with acute hypoxic respiratory failure       Today is hospital day 71d.     PAST MEDICAL & SURGICAL HISTORY  Down syndrome    Osteoporosis    Mild anemia    Neuropathy    S/P debridement  of R hip on 3/2/21      ALLERGIES:  No Known Allergies    MEDICATIONS:  STANDING MEDICATIONS  acetylcysteine 20%  Inhalation 4 milliLiter(s) Inhalation every 6 hours  albuterol    0.083% 2.5 milliGRAM(s) Nebulizer every 6 hours  albuterol    90 MICROgram(s) HFA Inhaler 1 Puff(s) Inhalation every 4 hours  AQUAPHOR (petrolatum Ointment) 1 Application(s) Topical two times a day  artificial  tears Solution 1 Drop(s) Both EYES two times a day  chlorhexidine 2% Cloths 1 Application(s) Topical daily  enoxaparin Injectable 50 milliGRAM(s) SubCutaneous every 12 hours  erythromycin   Ointment 1 Application(s) Both EYES daily  gabapentin 300 milliGRAM(s) Oral every 12 hours  levETIRAcetam  Solution 500 milliGRAM(s) Oral two times a day  melatonin 3 milliGRAM(s) Oral at bedtime  midodrine. 20 milliGRAM(s) Oral three times a day  pantoprazole  Injectable 40 milliGRAM(s) IV Push every 24 hours  polyethylene glycol 3350 17 Gram(s) Oral at bedtime  raloxifene 60 milliGRAM(s) Oral daily  saccharomyces boulardii 250 milliGRAM(s) Oral two times a day  senna 2 Tablet(s) Oral at bedtime  sodium chloride 0.65% Nasal 2 Spray(s) Both Nostrils three times a day    PRN MEDICATIONS  acetaminophen     Tablet .. 650 milliGRAM(s) Oral every 8 hours PRN  aluminum hydroxide/magnesium hydroxide/simethicone Suspension 30 milliLiter(s) Oral every 4 hours PRN  ondansetron Injectable 4 milliGRAM(s) IV Push every 8 hours PRN    VITALS:   T(F): 97.2  HR: 62  BP: 106/58  RR: 18  SpO2: --    LABS:                        8.5    7.85  )-----------( 428      ( 30 Mar 2024 09:05 )             27.7     03-30    140  |  103  |  14  ----------------------------<  122<H>  4.3   |  28  |  0.6<L>    Ca    8.8      30 Mar 2024 09:05  Mg     2.3     03-31    TPro  6.0  /  Alb  3.1<L>  /  TBili  0.2  /  DBili  x   /  AST  14  /  ALT  10  /  AlkPhos  100  03-30    PT/INR - ( 29 Mar 2024 12:01 )   PT: 12.20 sec;   INR: 1.07 ratio         PTT - ( 29 Mar 2024 12:01 )  PTT:37.3 sec  Urinalysis Basic - ( 30 Mar 2024 09:05 )    Color: x / Appearance: x / SG: x / pH: x  Gluc: 122 mg/dL / Ketone: x  / Bili: x / Urobili: x   Blood: x / Protein: x / Nitrite: x   Leuk Esterase: x / RBC: x / WBC x   Sq Epi: x / Non Sq Epi: x / Bacteria: x                RADIOLOGY:    PHYSICAL EXAM:  GEN: No acute distress  LUNGS: Clear to auscultation bilaterally   HEART: S1/S2 present. RRR.   ABD/ GI: Soft, non-tender, non-distended. Bowel sounds present  EXT: NC/NC/NE/2+PP/COHEN  NEURO: AAOX3

## 2024-03-31 NOTE — PROVIDER CONTACT NOTE (MEDICATION) - ASSESSMENT
RN assessed pt at bedside. Pt is spitting up dark sputum. Vitals signs were stable. MD ledbetter and MD benitez assessed sputum at bedside. RN awaiting interventions. Safety and comfort maintained.

## 2024-03-31 NOTE — PROVIDER CONTACT NOTE (MEDICATION) - BACKGROUND
pt is here for L hip fx and constipation. Pt has had multiple laxatives/enemas and pt still has yet to make a BM.

## 2024-04-01 LAB
ALBUMIN SERPL ELPH-MCNC: 3.4 G/DL — LOW (ref 3.5–5.2)
ALP SERPL-CCNC: 102 U/L — SIGNIFICANT CHANGE UP (ref 30–115)
ALT FLD-CCNC: 13 U/L — SIGNIFICANT CHANGE UP (ref 0–41)
ANION GAP SERPL CALC-SCNC: 11 MMOL/L — SIGNIFICANT CHANGE UP (ref 7–14)
AST SERPL-CCNC: 18 U/L — SIGNIFICANT CHANGE UP (ref 0–41)
BASOPHILS # BLD AUTO: 0.09 K/UL — SIGNIFICANT CHANGE UP (ref 0–0.2)
BASOPHILS NFR BLD AUTO: 1.4 % — HIGH (ref 0–1)
BILIRUB SERPL-MCNC: 0.2 MG/DL — SIGNIFICANT CHANGE UP (ref 0.2–1.2)
BUN SERPL-MCNC: 12 MG/DL — SIGNIFICANT CHANGE UP (ref 10–20)
CALCIUM SERPL-MCNC: 9.3 MG/DL — SIGNIFICANT CHANGE UP (ref 8.4–10.4)
CHLORIDE SERPL-SCNC: 101 MMOL/L — SIGNIFICANT CHANGE UP (ref 98–110)
CO2 SERPL-SCNC: 26 MMOL/L — SIGNIFICANT CHANGE UP (ref 17–32)
CREAT SERPL-MCNC: 0.6 MG/DL — LOW (ref 0.7–1.5)
EGFR: 105 ML/MIN/1.73M2 — SIGNIFICANT CHANGE UP
EOSINOPHIL # BLD AUTO: 0.35 K/UL — SIGNIFICANT CHANGE UP (ref 0–0.7)
EOSINOPHIL NFR BLD AUTO: 5.3 % — SIGNIFICANT CHANGE UP (ref 0–8)
GLUCOSE BLDC GLUCOMTR-MCNC: 107 MG/DL — HIGH (ref 70–99)
GLUCOSE BLDC GLUCOMTR-MCNC: 120 MG/DL — HIGH (ref 70–99)
GLUCOSE SERPL-MCNC: 108 MG/DL — HIGH (ref 70–99)
HCT VFR BLD CALC: 33.6 % — LOW (ref 37–47)
HGB BLD-MCNC: 10.2 G/DL — LOW (ref 12–16)
IMM GRANULOCYTES NFR BLD AUTO: 0.2 % — SIGNIFICANT CHANGE UP (ref 0.1–0.3)
LYMPHOCYTES # BLD AUTO: 1.91 K/UL — SIGNIFICANT CHANGE UP (ref 1.2–3.4)
LYMPHOCYTES # BLD AUTO: 29 % — SIGNIFICANT CHANGE UP (ref 20.5–51.1)
MAGNESIUM SERPL-MCNC: 2.4 MG/DL — SIGNIFICANT CHANGE UP (ref 1.8–2.4)
MCHC RBC-ENTMCNC: 25.7 PG — LOW (ref 27–31)
MCHC RBC-ENTMCNC: 30.4 G/DL — LOW (ref 32–37)
MCV RBC AUTO: 84.6 FL — SIGNIFICANT CHANGE UP (ref 81–99)
MONOCYTES # BLD AUTO: 0.34 K/UL — SIGNIFICANT CHANGE UP (ref 0.1–0.6)
MONOCYTES NFR BLD AUTO: 5.2 % — SIGNIFICANT CHANGE UP (ref 1.7–9.3)
NEUTROPHILS # BLD AUTO: 3.89 K/UL — SIGNIFICANT CHANGE UP (ref 1.4–6.5)
NEUTROPHILS NFR BLD AUTO: 58.9 % — SIGNIFICANT CHANGE UP (ref 42.2–75.2)
NRBC # BLD: 0 /100 WBCS — SIGNIFICANT CHANGE UP (ref 0–0)
PLATELET # BLD AUTO: 412 K/UL — HIGH (ref 130–400)
PMV BLD: 10.4 FL — SIGNIFICANT CHANGE UP (ref 7.4–10.4)
POTASSIUM SERPL-MCNC: 4.4 MMOL/L — SIGNIFICANT CHANGE UP (ref 3.5–5)
POTASSIUM SERPL-SCNC: 4.4 MMOL/L — SIGNIFICANT CHANGE UP (ref 3.5–5)
PROT SERPL-MCNC: 6.8 G/DL — SIGNIFICANT CHANGE UP (ref 6–8)
RBC # BLD: 3.97 M/UL — LOW (ref 4.2–5.4)
RBC # FLD: 26.1 % — HIGH (ref 11.5–14.5)
SODIUM SERPL-SCNC: 138 MMOL/L — SIGNIFICANT CHANGE UP (ref 135–146)
WBC # BLD: 6.59 K/UL — SIGNIFICANT CHANGE UP (ref 4.8–10.8)
WBC # FLD AUTO: 6.59 K/UL — SIGNIFICANT CHANGE UP (ref 4.8–10.8)

## 2024-04-01 PROCEDURE — 99232 SBSQ HOSP IP/OBS MODERATE 35: CPT

## 2024-04-01 RX ORDER — ENOXAPARIN SODIUM 100 MG/ML
50 INJECTION SUBCUTANEOUS ONCE
Refills: 0 | Status: COMPLETED | OUTPATIENT
Start: 2024-04-01 | End: 2024-04-01

## 2024-04-01 RX ADMIN — SENNA PLUS 2 TABLET(S): 8.6 TABLET ORAL at 21:37

## 2024-04-01 RX ADMIN — Medication 1 APPLICATION(S): at 05:59

## 2024-04-01 RX ADMIN — POLYETHYLENE GLYCOL 3350 17 GRAM(S): 17 POWDER, FOR SOLUTION ORAL at 21:37

## 2024-04-01 RX ADMIN — Medication 3 MILLIGRAM(S): at 21:36

## 2024-04-01 RX ADMIN — ENOXAPARIN SODIUM 50 MILLIGRAM(S): 100 INJECTION SUBCUTANEOUS at 06:00

## 2024-04-01 RX ADMIN — Medication 4 MILLILITER(S): at 07:32

## 2024-04-01 RX ADMIN — RALOXIFENE HYDROCHLORIDE 60 MILLIGRAM(S): 60 TABLET, COATED ORAL at 11:43

## 2024-04-01 RX ADMIN — Medication 1 DROP(S): at 17:06

## 2024-04-01 RX ADMIN — Medication 250 MILLIGRAM(S): at 06:03

## 2024-04-01 RX ADMIN — Medication 2 SPRAY(S): at 21:37

## 2024-04-01 RX ADMIN — ALBUTEROL 2.5 MILLIGRAM(S): 90 AEROSOL, METERED ORAL at 20:20

## 2024-04-01 RX ADMIN — Medication 4 MILLILITER(S): at 13:29

## 2024-04-01 RX ADMIN — LEVETIRACETAM 500 MILLIGRAM(S): 250 TABLET, FILM COATED ORAL at 17:06

## 2024-04-01 RX ADMIN — MIDODRINE HYDROCHLORIDE 20 MILLIGRAM(S): 2.5 TABLET ORAL at 06:01

## 2024-04-01 RX ADMIN — Medication 1 APPLICATION(S): at 17:06

## 2024-04-01 RX ADMIN — Medication 4 MILLILITER(S): at 20:21

## 2024-04-01 RX ADMIN — MIDODRINE HYDROCHLORIDE 20 MILLIGRAM(S): 2.5 TABLET ORAL at 17:04

## 2024-04-01 RX ADMIN — MIDODRINE HYDROCHLORIDE 20 MILLIGRAM(S): 2.5 TABLET ORAL at 11:42

## 2024-04-01 RX ADMIN — ALBUTEROL 2.5 MILLIGRAM(S): 90 AEROSOL, METERED ORAL at 07:32

## 2024-04-01 RX ADMIN — PANTOPRAZOLE SODIUM 40 MILLIGRAM(S): 20 TABLET, DELAYED RELEASE ORAL at 05:59

## 2024-04-01 RX ADMIN — GABAPENTIN 300 MILLIGRAM(S): 400 CAPSULE ORAL at 17:05

## 2024-04-01 RX ADMIN — Medication 1 DROP(S): at 05:59

## 2024-04-01 RX ADMIN — Medication 1 APPLICATION(S): at 11:43

## 2024-04-01 RX ADMIN — LEVETIRACETAM 500 MILLIGRAM(S): 250 TABLET, FILM COATED ORAL at 06:00

## 2024-04-01 RX ADMIN — Medication 2 SPRAY(S): at 05:58

## 2024-04-01 RX ADMIN — GABAPENTIN 300 MILLIGRAM(S): 400 CAPSULE ORAL at 06:02

## 2024-04-01 RX ADMIN — ENOXAPARIN SODIUM 50 MILLIGRAM(S): 100 INJECTION SUBCUTANEOUS at 17:06

## 2024-04-01 RX ADMIN — ALBUTEROL 2.5 MILLIGRAM(S): 90 AEROSOL, METERED ORAL at 13:29

## 2024-04-01 RX ADMIN — CHLORHEXIDINE GLUCONATE 1 APPLICATION(S): 213 SOLUTION TOPICAL at 11:42

## 2024-04-01 NOTE — PROGRESS NOTE ADULT - ASSESSMENT
58F w/ PMHx Down Syndrome, nonverbal at baseline, Hypothyroidism, Cerebral palsy and Seizure Disorders presents to the ED from nursing facility/group home [facility]) presented to ED for respiratory distress and high grade fever. Patient found to be septic on admission.Admitted to SDU for management of acute respiratory distress 2/2 CAP/Aspiration PNA (20 Jan 2024 18:22)  While pt was on the floor she developed dyspnea after swallow evaluation, was spiking high grade fever, upgraded to SDU. Patient was being managed for acute hypoxic respiratory failure required requiring oxygen supplement Patient failed speech and swallow evaluation.  SLP recommended n.p.o. with plan oral means of nutrition.  GI consulted to consider  PEG placement.    # Oropharyngeal dysphagia, PEG evaluation  speech and swallow recommendation.  N.p.o. with nonoral means of nutrition.  Consent was obtained    Plan  PEG tube placement tomorrow  NPO after midnight   Please don't hold Midodrine in AM   Patient needs to be off Lovenox 24 hours before procedure  Will follow  58F w/ PMHx Down Syndrome, nonverbal at baseline, Hypothyroidism, Cerebral palsy and Seizure Disorders presents to the ED from nursing facility/group home ([facility]) presented to ED for respiratory distress and high grade fever. Patient found to be septic on admission. Admitted to SDU for management of acute respiratory distress 2/2 CAP/Aspiration PNA (20 Jan 2024 18:22)  While pt was on the floor she developed dyspnea after swallow evaluation, was spiking high grade fever, upgraded to SDU. Patient was being managed for acute hypoxic respiratory failure required requiring oxygen supplement Patient failed speech and swallow evaluation.  SLP recommended n.p.o. with plan oral means of nutrition.  GI consulted to consider  PEG placement.    # Oropharyngeal dysphagia, PEG evaluation  speech and swallow recommendation.  N.p.o. with nonoral means of nutrition.  Consent was obtained    Plan  EGD/PEG tube placement tomorrow  NPO after midnight   Check CBC, CMP, PT/INR in am  Please don't hold Midodrine in AM   Monitor hemodynamics  Patient needs to be off Lovenox 24 hours before procedure  Will follow

## 2024-04-01 NOTE — PROGRESS NOTE ADULT - ASSESSMENT
58F w/ PMHx Down Syndrome, nonverbal at baseline, Hypothyroidism, Cerebral palsy and Seizure Disorders [facility] ED [facility] presented to ED for respiratory distress and high grade fever. Patient found to be septic on admission. Admitted to SDU for management of acute respiratory distress 2/2 CAP/Aspiration PNA (20 Jan 2024 18:22)  While pt was on the floor she developed dyspnea; consulted by pulmonary/critical care and was upgraded back to SDU 3/14. During CEU, she was tapered off of HFNC and complete course of antibiotics. She was stable for downgrade on 3/17.     # Sepsis POA / Acute hypoxic resp failure / RSV bronchiolitis /  H/o Dysphagia/ suspected aspiration    - continue midodrine 20 Q8H  - completed caspo (end 3/10) per ID   - Failed FEES, currently on NG tube; needs PEG  - gi consulted for PEG , hunter jhaveri ([phone]) for consent - consent obtained, GI planning for PEG tube  - c/w albuterol  - on RA today  - sp meropenem 1g q8h IV and  levaquin 750mg q24h; finished 3/20  - PEG tube was scheduled for 3/29 but canceled d/t hypotension   -PEG tube rescheduled for 04/02,   - NPO after midnight except medications( to give morning dose of Midodrine )     #  Elevated Troponin , type 2 MI/  Sinus tachycardia  - c/w telemetry monitoring   - Repeat EKG: Normal sinus rhythm  - sp metoprolol  - TTE 2/19 normal EF no DD or valve abnormalities    # Seizure Disorder  - c/w  Keppra   - seizure precautions  - keep Mg above 2.0     # H/o lower ext DVT   - c/w therapeutic Lovenox, Eliquis on dc    #  Hypothyroidism  - TSH 0.51    # Normocytic Anemia, anemia of chronic disease   - monitor H/H, keep Hb above 7.5     # Down Syndrome/  Cerebral Palsy/ functional quadriplegia   - supportive care  - prevent falls and aspiration   - failed speech and swallow, needs PEG as above  - gi consulted for peg, consent obtained>> PEG placement tomorrow  - on Raloxifene     Pending: PEG placement by GI (consent was obtained)>> Tomorrow

## 2024-04-01 NOTE — PROGRESS NOTE ADULT - SUBJECTIVE AND OBJECTIVE BOX
24H events:    Patient is a 58y old Female who presents with a chief complaint of Respiratory Distress (31 Mar 2024 11:56)    Primary diagnosis of Sepsis with acute hypoxic respiratory failure    Today is hospital day 72d. This morning patient was seen and examined at bedside, resting comfortably in bed.    No acute or major events overnight.    Code Status: DNI/DNR ; [place] of NIV     PAST MEDICAL & SURGICAL HISTORY  Down syndrome    Osteoporosis    Mild anemia    Neuropathy    S/P debridement  of R hip on 3/2/21      SOCIAL HISTORY:  Social History:      ALLERGIES:  No Known Allergies    MEDICATIONS:  STANDING MEDICATIONS  acetylcysteine 20%  Inhalation 4 milliLiter(s) Inhalation every 6 hours  albuterol    0.083% 2.5 milliGRAM(s) Nebulizer every 6 hours  albuterol    90 MICROgram(s) HFA Inhaler 1 Puff(s) Inhalation every 4 hours  AQUAPHOR (petrolatum Ointment) 1 Application(s) Topical two times a day  artificial  tears Solution 1 Drop(s) Both EYES two times a day  chlorhexidine 2% Cloths 1 Application(s) Topical daily  enoxaparin Injectable 50 milliGRAM(s) SubCutaneous every 12 hours  erythromycin   Ointment 1 Application(s) Both EYES daily  gabapentin 300 milliGRAM(s) Oral every 12 hours  levETIRAcetam  Solution 500 milliGRAM(s) Oral two times a day  melatonin 3 milliGRAM(s) Oral at bedtime  midodrine. 20 milliGRAM(s) Oral three times a day  pantoprazole  Injectable 40 milliGRAM(s) IV Push every 24 hours  polyethylene glycol 3350 17 Gram(s) Oral at bedtime  raloxifene 60 milliGRAM(s) Oral daily  saccharomyces boulardii 250 milliGRAM(s) Oral two times a day  senna 2 Tablet(s) Oral at bedtime  sodium chloride 0.65% Nasal 2 Spray(s) Both Nostrils three times a day    PRN MEDICATIONS  acetaminophen     Tablet .. 650 milliGRAM(s) Oral every 8 hours PRN  aluminum hydroxide/magnesium hydroxide/simethicone Suspension 30 milliLiter(s) Oral every 4 hours PRN  ondansetron Injectable 4 milliGRAM(s) IV Push every 8 hours PRN    VITALS:   T(F): 97.2  HR: 63  BP: 128/65  RR: 18  SpO2: --    PHYSICAL EXAM:  GENERAL:   ( x ) NAD, lying in bed comfortably     (  ) obtunded     (  ) lethargic     (  ) somnolent    HEAD:   (  ) Atraumatic     (  ) hematoma     (  ) laceration (specify location:       )     NECK:  (  ) Supple     (  ) neck stiffness     (  ) nuchal rigidity     (  )  no JVD     (  ) JVD present ( -- cm)    HEART:  Rate -->     ( x ) normal rate     (  ) bradycardic     (  ) tachycardic  Rhythm -->     (  ) regular     (  ) regularly irregular     (  ) irregularly irregular  Murmurs -->     (  ) normal s1s2     ( x ) systolic murmur     (  ) diastolic murmur     (  ) continuous murmur      (  ) S3 present     (  ) S4 present    LUNGS:   ( x )Unlabored respirations     (  ) tachypnea  ( x ) B/L air entry     (  ) decreased breath sounds in:  (location     )    ( x ) no adventitious sound     (  ) crackles     (  ) wheezing      (  ) rhonchi      (specify location:       )  (  ) chest wall tenderness (specify location:       )    ABDOMEN:   ( x ) Soft     (  ) tense   |   ( x ) nondistended     (  ) distended   |   (  ) +BS     (  ) hypoactive bowel sounds     (  ) hyperactive bowel sounds  ( x ) nontender     (  ) RUQ tenderness     (  ) RLQ tenderness     (  ) LLQ tenderness     (  ) epigastric tenderness     (  ) diffuse tenderness  (  ) Splenomegaly      (  ) Hepatomegaly      (  ) Jaundice     (  ) ecchymosis     EXTREMITIES:  ( x ) Normal     (  ) Rash     (  ) ecchymosis     (  ) varicose veins      (  ) pitting edema     (  ) non-pitting edema   (  ) ulceration     (  ) gangrene:     (location:     )    NERVOUS SYSTEM:    ( x ) A&Ox0     (  ) confused     (  ) lethargic  CN II-XII:     (  ) Intact     (  ) deficits found     (Specify:     )   Upper extremities:     (  ) no sensorimotor deficits     (  ) weakness     (  ) loss of proprioception/vibration     (  ) loss of touch/temperature (specify:    )  Lower extremities:     (  ) no sensorimotor deficits     (  ) weakness     (  ) loss of proprioception/vibration     (  ) loss of touch/temperature (specify:    )    SKIN:   (  ) No rashes or lesions     (  ) maculopapular rash     (  ) pustules     (  ) vesicles     (  ) ulcer     (  ) ecchymosis     (specify location:     )      LABS:                        10.2   6.59  )-----------( 412      ( 01 Apr 2024 07:06 )             33.6     04-01    138  |  101  |  12  ----------------------------<  108<H>  4.4   |  26  |  0.6<L>    Ca    9.3      01 Apr 2024 07:06  Mg     2.4     04-01    TPro  6.8  /  Alb  3.4<L>  /  TBili  0.2  /  DBili  x   /  AST  18  /  ALT  13  /  AlkPhos  102  04-01      Urinalysis Basic - ( 01 Apr 2024 07:06 )    Color: x / Appearance: x / SG: x / pH: x  Gluc: 108 mg/dL / Ketone: x  / Bili: x / Urobili: x   Blood: x / Protein: x / Nitrite: x   Leuk Esterase: x / RBC: x / WBC x   Sq Epi: x / Non Sq Epi: x / Bacteria: x                RADIOLOGY:      < from: Xray Chest 1 View AP/PA (03.29.24 @ 16:58) >    Findings:    Support devices: An enteric tube is seen with tip in the stomach.    Cardiac/mediastinum/hilum: Unremarkable.    Lung parenchyma/Pleura: Retrocardiac opacification, unchanged.    Skeleton/soft tissues: Stable.    Impression:    Retrocardiac opacity, unchanged.    < end of copied text >

## 2024-04-01 NOTE — PROGRESS NOTE ADULT - SUBJECTIVE AND OBJECTIVE BOX
TEA ECHAVARRIA 58y Female  MRN#: [id number]   CODE STATUS:________      SUBJECTIVE  Patient is a 58y old Female who presents with     OBJECTIVE  PAST MEDICAL & SURGICAL HISTORY  Down syndrome    Osteoporosis    Mild anemia    Neuropathy    S/P debridement  of R hip on 3/2/21      ALLERGIES:  No Known Allergies    MEDICATIONS:  STANDING MEDICATIONS  acetylcysteine 20%  Inhalation 4 milliLiter(s) Inhalation every 6 hours  albuterol    0.083% 2.5 milliGRAM(s) Nebulizer every 6 hours  albuterol    90 MICROgram(s) HFA Inhaler 1 Puff(s) Inhalation every 4 hours  AQUAPHOR (petrolatum Ointment) 1 Application(s) Topical two times a day  artificial  tears Solution 1 Drop(s) Both EYES two times a day  chlorhexidine 2% Cloths 1 Application(s) Topical daily  enoxaparin Injectable 50 milliGRAM(s) SubCutaneous every 12 hours  erythromycin   Ointment 1 Application(s) Both EYES daily  gabapentin 300 milliGRAM(s) Oral every 12 hours  levETIRAcetam  Solution 500 milliGRAM(s) Oral two times a day  melatonin 3 milliGRAM(s) Oral at bedtime  midodrine. 20 milliGRAM(s) Oral three times a day  pantoprazole  Injectable 40 milliGRAM(s) IV Push every 24 hours  polyethylene glycol 3350 17 Gram(s) Oral at bedtime  raloxifene 60 milliGRAM(s) Oral daily  saccharomyces boulardii 250 milliGRAM(s) Oral two times a day  senna 2 Tablet(s) Oral at bedtime  sodium chloride 0.65% Nasal 2 Spray(s) Both Nostrils three times a day    PRN MEDICATIONS  acetaminophen     Tablet .. 650 milliGRAM(s) Oral every 8 hours PRN  aluminum hydroxide/magnesium hydroxide/simethicone Suspension 30 milliLiter(s) Oral every 4 hours PRN  ondansetron Injectable 4 milliGRAM(s) IV Push every 8 hours PRN      VITAL SIGNS: Last 24 Hours  T(C): 36.2 (01 Apr 2024 07:35), Max: 36.2 (31 Mar 2024 15:00)  T(F): 97.2 (01 Apr 2024 07:35), Max: 97.2 (31 Mar 2024 15:00)  HR: 63 (01 Apr 2024 07:35) (62 - 65)  BP: 128/65 (01 Apr 2024 07:35) (91/53 - 128/65)  BP(mean): --  RR: 18 (01 Apr 2024 07:35) (18 - 18)  SpO2: --    LABS:                        10.2   6.59  )-----------( 412      ( 01 Apr 2024 07:06 )             33.6     04-01    138  |  101  |  12  ----------------------------<  108<H>  4.4   |  26  |  0.6<L>    Ca    9.3      01 Apr 2024 07:06  Mg     2.4     04-01    TPro  6.8  /  Alb  3.4<L>  /  TBili  0.2  /  DBili  x   /  AST  18  /  ALT  13  /  AlkPhos  102  04-01      Urinalysis Basic - ( 01 Apr 2024 07:06 )    Color: x / Appearance: x / SG: x / pH: x  Gluc: 108 mg/dL / Ketone: x  / Bili: x / Urobili: x   Blood: x / Protein: x / Nitrite: x   Leuk Esterase: x / RBC: x / WBC x   Sq Epi: x / Non Sq Epi: x / Bacteria: x                RADIOLOGY:      PHYSICAL EXAM:    GENERAL: NAD, well-developed, AAOx3  HEENT:  Atraumatic, Normocephalic. EOMI, PERRLA, conjunctiva and sclera clear, No JVD  PULMONARY: Clear to auscultation bilaterally; No wheeze  CARDIOVASCULAR: Regular rate and rhythm; No murmurs, rubs, or gallops  GASTROINTESTINAL: Soft, Nontender, Nondistended; Bowel sounds present  MUSCULOSKELETAL:  2+ Peripheral Pulses, No clubbing, cyanosis, or edema  NEUROLOGY: non-focal  SKIN: No rashes or lesions     TEA ECHAVARRIA 58y Female  MRN#: [id number]       SUBJECTIVE  Patient is a 58y old Female who presents with a chief complaint of  Sepsis with acute hypoxic respiratory failure  no overnight events   pending G tube placement   OBJECTIVE  PAST MEDICAL & SURGICAL HISTORY  Down syndrome    Osteoporosis    Mild anemia    Neuropathy    S/P debridement  of R hip on 3/2/21      ALLERGIES:  No Known Allergies    MEDICATIONS:  STANDING MEDICATIONS  acetylcysteine 20%  Inhalation 4 milliLiter(s) Inhalation every 6 hours  albuterol    0.083% 2.5 milliGRAM(s) Nebulizer every 6 hours  albuterol    90 MICROgram(s) HFA Inhaler 1 Puff(s) Inhalation every 4 hours  AQUAPHOR (petrolatum Ointment) 1 Application(s) Topical two times a day  artificial  tears Solution 1 Drop(s) Both EYES two times a day  chlorhexidine 2% Cloths 1 Application(s) Topical daily  enoxaparin Injectable 50 milliGRAM(s) SubCutaneous every 12 hours  erythromycin   Ointment 1 Application(s) Both EYES daily  gabapentin 300 milliGRAM(s) Oral every 12 hours  levETIRAcetam  Solution 500 milliGRAM(s) Oral two times a day  melatonin 3 milliGRAM(s) Oral at bedtime  midodrine. 20 milliGRAM(s) Oral three times a day  pantoprazole  Injectable 40 milliGRAM(s) IV Push every 24 hours  polyethylene glycol 3350 17 Gram(s) Oral at bedtime  raloxifene 60 milliGRAM(s) Oral daily  saccharomyces boulardii 250 milliGRAM(s) Oral two times a day  senna 2 Tablet(s) Oral at bedtime  sodium chloride 0.65% Nasal 2 Spray(s) Both Nostrils three times a day    PRN MEDICATIONS  acetaminophen     Tablet .. 650 milliGRAM(s) Oral every 8 hours PRN  aluminum hydroxide/magnesium hydroxide/simethicone Suspension 30 milliLiter(s) Oral every 4 hours PRN  ondansetron Injectable 4 milliGRAM(s) IV Push every 8 hours PRN      VITAL SIGNS: Last 24 Hours  T(C): 36.2 (01 Apr 2024 07:35), Max: 36.2 (31 Mar 2024 15:00)  T(F): 97.2 (01 Apr 2024 07:35), Max: 97.2 (31 Mar 2024 15:00)  HR: 63 (01 Apr 2024 07:35) (62 - 65)  BP: 128/65 (01 Apr 2024 07:35) (91/53 - 128/65)  BP(mean): --  RR: 18 (01 Apr 2024 07:35) (18 - 18)  SpO2: --    LABS:                        10.2   6.59  )-----------( 412      ( 01 Apr 2024 07:06 )             33.6     04-01    138  |  101  |  12  ----------------------------<  108<H>  4.4   |  26  |  0.6<L>    Ca    9.3      01 Apr 2024 07:06  Mg     2.4     04-01    TPro  6.8  /  Alb  3.4<L>  /  TBili  0.2  /  DBili  x   /  AST  18  /  ALT  13  /  AlkPhos  102  04-01      Urinalysis Basic - ( 01 Apr 2024 07:06 )    Color: x / Appearance: x / SG: x / pH: x  Gluc: 108 mg/dL / Ketone: x  / Bili: x / Urobili: x   Blood: x / Protein: x / Nitrite: x   Leuk Esterase: x / RBC: x / WBC x   Sq Epi: x / Non Sq Epi: x / Bacteria: x    RADIOLOGY:      PHYSICAL EXAM:    GENERAL: not in distress   HEENT:  No JVD  PULMONARY: Clear to auscultation bilaterally  CARDIOVASCULAR: Regular rate and rhythm  GASTROINTESTINAL: Soft, Nontender, Nondistended  MUSCULOSKELETAL:  No clubbing, cyanosis, or edema  NEUROLOGY: AAOx0  SKIN: No rashes or lesions

## 2024-04-01 NOTE — PROGRESS NOTE ADULT - ASSESSMENT
58F w/ PMHx Down Syndrome, nonverbal at baseline, Hypothyroidism, Cerebral palsy and Seizure Disorders presents to the ED from nursing facility/group home ([facility]) presented to ED for respiratory distress and high grade fever. Patient found to be septic on admission. Admitted to SDU for management of acute respiratory distress 2/2 CAP/Aspiration PNA (20 Jan 2024 18:22)  While pt was on the floor she developed dyspnea  consulted by pulmonary/critical care and was upgraded back to SDU.    A/P    # Dysphagia- plan for PEG-- consent obtained   NG tube feeding-- PEG could not be placed due to low BP-- reschedule tuesday  consent from advisory board-obtained   office phone number is [phone]    # Sepsis POA - improved  - Acute hypoxic resp failure - multifactorial RSV bronchiolitis on admission; then concern for possible gram negative pneumonia and suspected aspiration  - Staph epidermidis bacteremia in 3/14  - staph hominis  on presentation -  contaminant  repeat cultures 3/15- NGTD  ID following- s/p danna and levaquin for 7 days till 3/20; and vanc 5 days from time of negative cultures- completed  - midline removed  fungitell -81; no further intervention; recheck level in 1 week  - s/p steroid course  - currently on nasal cannula 2L; -  wean down on oxygen as tolerated    # Seizure Disorder  - c/w  Keppra - change to solution  - seizure precautions  - keep Mg above 2.0     # H/o lower ext DVT   - c/w therapeutic Lovenox, Eliquis on dc    #  Hypothyroidism  - TSH 0.51    # microcytic Anemia, anemia of chronic disease   - monitor H/H, hb stable today  ferritin- 281, transferring saturation <6%; possible iron deficiency- started on venofer-- day 4    #  Elevated Troponin , type 2 MI/  Sinus tachycardia- c/w  metoprolol  - TTE 2/19 normal EF no DD or valve abnormalities    # h/o duodenal ulceration  continue PPI bid; change to daily after EGD/PEG if stable    # Down Syndrome/  Cerebral Palsy/ functional quadriplegia   - supportive care  - prevent falls and aspiration   - failed speech and swallow, might  need  PEG ( see above)  - on Raloxifene     # multiple pressure ulcers- continue wound care per recommendation from wound care; d/w nursing staff    # hypotension- on midodrine; BP low-- not septic-- labs are stable --   pending PEG for  AM-- hold lovenox

## 2024-04-01 NOTE — PROGRESS NOTE ADULT - SUBJECTIVE AND OBJECTIVE BOX
SUBJECTIVE:    Patient is a 58y old Female who presents with a chief complaint of Respiratory Distress (01 Apr 2024 13:20)    Currently admitted to medicine with the primary diagnosis of Sepsis with acute hypoxic respiratory failure       Today is hospital day 72d.     PAST MEDICAL & SURGICAL HISTORY  Down syndrome    Osteoporosis    Mild anemia    Neuropathy    S/P debridement  of R hip on 3/2/21      ALLERGIES:  No Known Allergies    MEDICATIONS:  STANDING MEDICATIONS  acetylcysteine 20%  Inhalation 4 milliLiter(s) Inhalation every 6 hours  albuterol    0.083% 2.5 milliGRAM(s) Nebulizer every 6 hours  albuterol    90 MICROgram(s) HFA Inhaler 1 Puff(s) Inhalation every 4 hours  AQUAPHOR (petrolatum Ointment) 1 Application(s) Topical two times a day  artificial  tears Solution 1 Drop(s) Both EYES two times a day  chlorhexidine 2% Cloths 1 Application(s) Topical daily  enoxaparin Injectable 50 milliGRAM(s) SubCutaneous every 12 hours  erythromycin   Ointment 1 Application(s) Both EYES daily  gabapentin 300 milliGRAM(s) Oral every 12 hours  levETIRAcetam  Solution 500 milliGRAM(s) Oral two times a day  melatonin 3 milliGRAM(s) Oral at bedtime  midodrine. 20 milliGRAM(s) Oral three times a day  pantoprazole  Injectable 40 milliGRAM(s) IV Push every 24 hours  polyethylene glycol 3350 17 Gram(s) Oral at bedtime  raloxifene 60 milliGRAM(s) Oral daily  saccharomyces boulardii 250 milliGRAM(s) Oral two times a day  senna 2 Tablet(s) Oral at bedtime  sodium chloride 0.65% Nasal 2 Spray(s) Both Nostrils three times a day    PRN MEDICATIONS  acetaminophen     Tablet .. 650 milliGRAM(s) Oral every 8 hours PRN  aluminum hydroxide/magnesium hydroxide/simethicone Suspension 30 milliLiter(s) Oral every 4 hours PRN  ondansetron Injectable 4 milliGRAM(s) IV Push every 8 hours PRN    VITALS:   T(F): 97.2  HR: 63  BP: 128/65  RR: 18  SpO2: --    LABS:                        10.2   6.59  )-----------( 412      ( 01 Apr 2024 07:06 )             33.6     04-01    138  |  101  |  12  ----------------------------<  108<H>  4.4   |  26  |  0.6<L>    Ca    9.3      01 Apr 2024 07:06  Mg     2.4     04-01    TPro  6.8  /  Alb  3.4<L>  /  TBili  0.2  /  DBili  x   /  AST  18  /  ALT  13  /  AlkPhos  102  04-01      Urinalysis Basic - ( 01 Apr 2024 07:06 )    Color: x / Appearance: x / SG: x / pH: x  Gluc: 108 mg/dL / Ketone: x  / Bili: x / Urobili: x   Blood: x / Protein: x / Nitrite: x   Leuk Esterase: x / RBC: x / WBC x   Sq Epi: x / Non Sq Epi: x / Bacteria: x                RADIOLOGY:    PHYSICAL EXAM:  GEN: No acute distress  LUNGS: Clear to auscultation bilaterally   HEART: S1/S2 present. RRR.   ABD/ GI: Soft, non-tender, non-distended. Bowel sounds present  EXT: contracted  NEURO: Awake and alert

## 2024-04-02 ENCOUNTER — TRANSCRIPTION ENCOUNTER (OUTPATIENT)
Age: 59
End: 2024-04-02

## 2024-04-02 LAB
ALBUMIN SERPL ELPH-MCNC: 3.4 G/DL — LOW (ref 3.5–5.2)
ALP SERPL-CCNC: 104 U/L — SIGNIFICANT CHANGE UP (ref 30–115)
ALT FLD-CCNC: 15 U/L — SIGNIFICANT CHANGE UP (ref 0–41)
ANION GAP SERPL CALC-SCNC: 14 MMOL/L — SIGNIFICANT CHANGE UP (ref 7–14)
APTT BLD: 34 SEC — SIGNIFICANT CHANGE UP (ref 27–39.2)
AST SERPL-CCNC: 17 U/L — SIGNIFICANT CHANGE UP (ref 0–41)
BASOPHILS # BLD AUTO: 0.08 K/UL — SIGNIFICANT CHANGE UP (ref 0–0.2)
BASOPHILS NFR BLD AUTO: 1 % — SIGNIFICANT CHANGE UP (ref 0–1)
BILIRUB SERPL-MCNC: 0.5 MG/DL — SIGNIFICANT CHANGE UP (ref 0.2–1.2)
BUN SERPL-MCNC: 14 MG/DL — SIGNIFICANT CHANGE UP (ref 10–20)
CALCIUM SERPL-MCNC: 9.4 MG/DL — SIGNIFICANT CHANGE UP (ref 8.4–10.4)
CHLORIDE SERPL-SCNC: 103 MMOL/L — SIGNIFICANT CHANGE UP (ref 98–110)
CO2 SERPL-SCNC: 25 MMOL/L — SIGNIFICANT CHANGE UP (ref 17–32)
CREAT SERPL-MCNC: 0.6 MG/DL — LOW (ref 0.7–1.5)
EGFR: 105 ML/MIN/1.73M2 — SIGNIFICANT CHANGE UP
EOSINOPHIL # BLD AUTO: 0.36 K/UL — SIGNIFICANT CHANGE UP (ref 0–0.7)
EOSINOPHIL NFR BLD AUTO: 4.6 % — SIGNIFICANT CHANGE UP (ref 0–8)
GLUCOSE SERPL-MCNC: 128 MG/DL — HIGH (ref 70–99)
HCT VFR BLD CALC: 32.3 % — LOW (ref 37–47)
HGB BLD-MCNC: 9.6 G/DL — LOW (ref 12–16)
IMM GRANULOCYTES NFR BLD AUTO: 0.5 % — HIGH (ref 0.1–0.3)
INR BLD: 1.02 RATIO — SIGNIFICANT CHANGE UP (ref 0.65–1.3)
LYMPHOCYTES # BLD AUTO: 1.93 K/UL — SIGNIFICANT CHANGE UP (ref 1.2–3.4)
LYMPHOCYTES # BLD AUTO: 24.7 % — SIGNIFICANT CHANGE UP (ref 20.5–51.1)
MCHC RBC-ENTMCNC: 25.8 PG — LOW (ref 27–31)
MCHC RBC-ENTMCNC: 29.7 G/DL — LOW (ref 32–37)
MCV RBC AUTO: 86.8 FL — SIGNIFICANT CHANGE UP (ref 81–99)
MONOCYTES # BLD AUTO: 0.34 K/UL — SIGNIFICANT CHANGE UP (ref 0.1–0.6)
MONOCYTES NFR BLD AUTO: 4.4 % — SIGNIFICANT CHANGE UP (ref 1.7–9.3)
NEUTROPHILS # BLD AUTO: 5.05 K/UL — SIGNIFICANT CHANGE UP (ref 1.4–6.5)
NEUTROPHILS NFR BLD AUTO: 64.8 % — SIGNIFICANT CHANGE UP (ref 42.2–75.2)
NRBC # BLD: 0 /100 WBCS — SIGNIFICANT CHANGE UP (ref 0–0)
PLATELET # BLD AUTO: 443 K/UL — HIGH (ref 130–400)
PMV BLD: SIGNIFICANT CHANGE UP (ref 7.4–10.4)
POTASSIUM SERPL-MCNC: 5 MMOL/L — SIGNIFICANT CHANGE UP (ref 3.5–5)
POTASSIUM SERPL-SCNC: 5 MMOL/L — SIGNIFICANT CHANGE UP (ref 3.5–5)
PROT SERPL-MCNC: 6.8 G/DL — SIGNIFICANT CHANGE UP (ref 6–8)
PROTHROM AB SERPL-ACNC: 11.6 SEC — SIGNIFICANT CHANGE UP (ref 9.95–12.87)
RBC # BLD: 3.72 M/UL — LOW (ref 4.2–5.4)
RBC # FLD: 26.3 % — HIGH (ref 11.5–14.5)
SODIUM SERPL-SCNC: 142 MMOL/L — SIGNIFICANT CHANGE UP (ref 135–146)
WBC # BLD: 7.8 K/UL — SIGNIFICANT CHANGE UP (ref 4.8–10.8)
WBC # FLD AUTO: 7.8 K/UL — SIGNIFICANT CHANGE UP (ref 4.8–10.8)

## 2024-04-02 PROCEDURE — 99232 SBSQ HOSP IP/OBS MODERATE 35: CPT

## 2024-04-02 PROCEDURE — 43246 EGD PLACE GASTROSTOMY TUBE: CPT

## 2024-04-02 RX ADMIN — LEVETIRACETAM 500 MILLIGRAM(S): 250 TABLET, FILM COATED ORAL at 18:32

## 2024-04-02 RX ADMIN — POLYETHYLENE GLYCOL 3350 17 GRAM(S): 17 POWDER, FOR SOLUTION ORAL at 21:31

## 2024-04-02 RX ADMIN — Medication 1 DROP(S): at 18:39

## 2024-04-02 RX ADMIN — Medication 1 APPLICATION(S): at 05:44

## 2024-04-02 RX ADMIN — Medication 3 MILLIGRAM(S): at 21:31

## 2024-04-02 RX ADMIN — GABAPENTIN 300 MILLIGRAM(S): 400 CAPSULE ORAL at 18:32

## 2024-04-02 RX ADMIN — Medication 4 MILLILITER(S): at 07:54

## 2024-04-02 RX ADMIN — Medication 2 SPRAY(S): at 21:30

## 2024-04-02 RX ADMIN — PANTOPRAZOLE SODIUM 40 MILLIGRAM(S): 20 TABLET, DELAYED RELEASE ORAL at 05:48

## 2024-04-02 RX ADMIN — Medication 1 APPLICATION(S): at 18:38

## 2024-04-02 RX ADMIN — GABAPENTIN 300 MILLIGRAM(S): 400 CAPSULE ORAL at 05:52

## 2024-04-02 RX ADMIN — ALBUTEROL 2.5 MILLIGRAM(S): 90 AEROSOL, METERED ORAL at 19:45

## 2024-04-02 RX ADMIN — LEVETIRACETAM 500 MILLIGRAM(S): 250 TABLET, FILM COATED ORAL at 05:48

## 2024-04-02 RX ADMIN — Medication 1 DROP(S): at 05:53

## 2024-04-02 RX ADMIN — MIDODRINE HYDROCHLORIDE 20 MILLIGRAM(S): 2.5 TABLET ORAL at 18:39

## 2024-04-02 RX ADMIN — Medication 4 MILLILITER(S): at 19:46

## 2024-04-02 RX ADMIN — Medication 250 MILLIGRAM(S): at 18:32

## 2024-04-02 RX ADMIN — Medication 250 MILLIGRAM(S): at 05:48

## 2024-04-02 RX ADMIN — MIDODRINE HYDROCHLORIDE 20 MILLIGRAM(S): 2.5 TABLET ORAL at 05:47

## 2024-04-02 RX ADMIN — Medication 2 SPRAY(S): at 05:44

## 2024-04-02 RX ADMIN — SENNA PLUS 2 TABLET(S): 8.6 TABLET ORAL at 21:31

## 2024-04-02 RX ADMIN — CHLORHEXIDINE GLUCONATE 1 APPLICATION(S): 213 SOLUTION TOPICAL at 18:38

## 2024-04-02 RX ADMIN — ALBUTEROL 2.5 MILLIGRAM(S): 90 AEROSOL, METERED ORAL at 07:53

## 2024-04-02 RX ADMIN — Medication 1 APPLICATION(S): at 18:39

## 2024-04-02 RX ADMIN — RALOXIFENE HYDROCHLORIDE 60 MILLIGRAM(S): 60 TABLET, COATED ORAL at 18:46

## 2024-04-02 NOTE — CHART NOTE - NSCHARTNOTEFT_GEN_A_CORE
PACU ANESTHESIA ADMISSION NOTE      Procedure: PEG tube placement  Post op diagnosis:  dysphagia    __x__  Patent Airway    __x__  Full return of protective reflexes    ____  Full recovery from anesthesia / back to baseline     Vitals:   see anesthesia record      Mental Status:  ____ Awake   _____ Alert   _x___ Drowsy   _____ Sedated    Nausea/Vomiting:  __x__ NO  ______Yes,   See Post - Op Orders          Pain Scale (0-10):  _____    Treatment: ____ None    ___x_ See Post - Op/PCA Orders    Post - Operative Fluids:   ____ Oral   __x__ See Post - Op Orders    Plan: Discharge:   ____Home       __x___Floor     _____Critical Care    _____  Other:_________________    Comments:  Uneventful intraoperative course. No anesthesia issues or complications noted. Patient stable upon arrival to PACU. Report given to RN.

## 2024-04-02 NOTE — CHART NOTE - NSCHARTNOTEFT_GEN_A_CORE
s/p PEG placement       PLAN   - Can use G tube for medications in 4 hours.   - Can use G tube for feeding tomorrow.   - Flush with 50 cc of water before and after use. - Avoid placing more than one gauze between skin and the external flange.   - Skin care per RN.   - Will follow.

## 2024-04-02 NOTE — PROGRESS NOTE ADULT - SUBJECTIVE AND OBJECTIVE BOX
24H events:    Patient is a 58y old Female who presents with a chief complaint of Respiratory Distress (01 Apr 2024 13:47)    Primary diagnosis of Sepsis with acute hypoxic respiratory failure    Today is hospital day 73d. This morning patient was seen and examined at bedside, resting comfortably in bed.    No acute or major events overnight.      PAST MEDICAL & SURGICAL HISTORY  Down syndrome    Osteoporosis    Mild anemia    Neuropathy    S/P debridement  of R hip on 3/2/21      SOCIAL HISTORY:  Social History:      ALLERGIES:  No Known Allergies    MEDICATIONS:  STANDING MEDICATIONS  acetylcysteine 20%  Inhalation 4 milliLiter(s) Inhalation every 6 hours  albuterol    0.083% 2.5 milliGRAM(s) Nebulizer every 6 hours  albuterol    90 MICROgram(s) HFA Inhaler 1 Puff(s) Inhalation every 4 hours  AQUAPHOR (petrolatum Ointment) 1 Application(s) Topical two times a day  artificial  tears Solution 1 Drop(s) Both EYES two times a day  chlorhexidine 2% Cloths 1 Application(s) Topical daily  erythromycin   Ointment 1 Application(s) Both EYES daily  gabapentin 300 milliGRAM(s) Oral every 12 hours  levETIRAcetam  Solution 500 milliGRAM(s) Oral two times a day  melatonin 3 milliGRAM(s) Oral at bedtime  midodrine. 20 milliGRAM(s) Oral three times a day  pantoprazole  Injectable 40 milliGRAM(s) IV Push every 24 hours  polyethylene glycol 3350 17 Gram(s) Oral at bedtime  raloxifene 60 milliGRAM(s) Oral daily  saccharomyces boulardii 250 milliGRAM(s) Oral two times a day  senna 2 Tablet(s) Oral at bedtime  sodium chloride 0.65% Nasal 2 Spray(s) Both Nostrils three times a day    PRN MEDICATIONS  acetaminophen     Tablet .. 650 milliGRAM(s) Oral every 8 hours PRN  aluminum hydroxide/magnesium hydroxide/simethicone Suspension 30 milliLiter(s) Oral every 4 hours PRN  ondansetron Injectable 4 milliGRAM(s) IV Push every 8 hours PRN    VITALS:   T(F): 97.5  HR: 77  BP: 110/63  RR: 18  SpO2: 98%    PHYSICAL EXAM:  GENERAL:   ( x ) NAD, lying in bed comfortably     (  ) obtunded     (  ) lethargic     (  ) somnolent    HEAD:   (  ) Atraumatic     (  ) hematoma     (  ) laceration (specify location:       )     NECK:  (  ) Supple     (  ) neck stiffness     (  ) nuchal rigidity     (  )  no JVD     (  ) JVD present ( -- cm)    HEART:  Rate -->     ( x ) normal rate     (  ) bradycardic     (  ) tachycardic  Rhythm -->     (  ) regular     (  ) regularly irregular     (  ) irregularly irregular  Murmurs -->     (  ) normal s1s2     ( x ) systolic murmur     (  ) diastolic murmur     (  ) continuous murmur      (  ) S3 present     (  ) S4 present    LUNGS:   ( x )Unlabored respirations     (  ) tachypnea  ( x ) B/L air entry     (  ) decreased breath sounds in:  (location     )    ( x ) no adventitious sound     (  ) crackles     (  ) wheezing      (  ) rhonchi      (specify location:       )  (  ) chest wall tenderness (specify location:       )    ABDOMEN:   ( x ) Soft     (  ) tense   |   ( x ) nondistended     (  ) distended   |   (  ) +BS     (  ) hypoactive bowel sounds     (  ) hyperactive bowel sounds  ( x ) nontender     (  ) RUQ tenderness     (  ) RLQ tenderness     (  ) LLQ tenderness     (  ) epigastric tenderness     (  ) diffuse tenderness  (  ) Splenomegaly      (  ) Hepatomegaly      (  ) Jaundice     (  ) ecchymosis     EXTREMITIES:  ( x ) Normal     (  ) Rash     (  ) ecchymosis     (  ) varicose veins      (  ) pitting edema     (  ) non-pitting edema   (  ) ulceration     (  ) gangrene:     (location:     )    NERVOUS SYSTEM:    ( x ) A&Ox0     (  ) confused     (  ) lethargic  CN II-XII:     (  ) Intact     (  ) deficits found     (Specify:     )   Upper extremities:     (  ) no sensorimotor deficits     (  ) weakness     (  ) loss of proprioception/vibration     (  ) loss of touch/temperature (specify:    )  Lower extremities:     (  ) no sensorimotor deficits     (  ) weakness     (  ) loss of proprioception/vibration     (  ) loss of touch/temperature (specify:    )    SKIN:   (  ) No rashes or lesions     (  ) maculopapular rash     (  ) pustules     (  ) vesicles     (  ) ulcer     (  ) ecchymosis     (specify location:     )      LABS:                        9.6    7.80  )-----------( 443      ( 02 Apr 2024 06:42 )             32.3     04-02    142  |  103  |  14  ----------------------------<  128<H>  5.0   |  25  |  0.6<L>    Ca    9.4      02 Apr 2024 06:42  Mg     2.4     04-01    TPro  6.8  /  Alb  3.4<L>  /  TBili  0.5  /  DBili  x   /  AST  17  /  ALT  15  /  AlkPhos  104  04-02    PT/INR - ( 02 Apr 2024 06:42 )   PT: 11.60 sec;   INR: 1.02 ratio         PTT - ( 02 Apr 2024 06:42 )  PTT:34.0 sec  Urinalysis Basic - ( 02 Apr 2024 06:42 )    Color: x / Appearance: x / SG: x / pH: x  Gluc: 128 mg/dL / Ketone: x  / Bili: x / Urobili: x   Blood: x / Protein: x / Nitrite: x   Leuk Esterase: x / RBC: x / WBC x   Sq Epi: x / Non Sq Epi: x / Bacteria: x

## 2024-04-02 NOTE — PROGRESS NOTE ADULT - ASSESSMENT
58F w/ PMHx Down Syndrome, nonverbal at baseline, Hypothyroidism, Cerebral palsy and Seizure Disorders presents to the ED from nursing facility/group home ([facility]) presented to ED for respiratory distress and high grade fever. Patient found to be septic on admission. Admitted to SDU for management of acute respiratory distress 2/2 CAP/Aspiration PNA (20 Jan 2024 18:22)  While pt was on the floor she developed dyspnea  consulted by pulmonary/critical care and was upgraded back to SDU.    A/P    # Dysphagia- plan for PEG-- consent obtained   NG tube feeding-- PEG could not be placed due to low BP-- reschedule today  consent from advisory board-obtained   office phone number is [phone]    # Sepsis POA - improved  - Acute hypoxic resp failure - multifactorial RSV bronchiolitis on admission; then concern for possible gram negative pneumonia and suspected aspiration  - Staph epidermidis bacteremia in 3/14  - staph hominis  on presentation -  contaminant  repeat cultures 3/15- NGTD  ID following- s/p danna and levaquin for 7 days till 3/20; and vanc 5 days from time of negative cultures- completed  - midline removed  fungitell -81; no further intervention; recheck level in 1 week  - s/p steroid course  - currently on nasal cannula 2L; -  wean down on oxygen as tolerated    # Seizure Disorder  - c/w  Keppra - change to solution  - seizure precautions  - keep Mg above 2.0     # H/o lower ext DVT   - c/w therapeutic Lovenox, Eliquis on dc-- hold lovenox today and restart AM    #  Hypothyroidism  - TSH 0.51    # microcytic Anemia, anemia of chronic disease   - monitor H/H, hb stable today  ferritin- 281, transferring saturation <6%; possible iron deficiency- s/p venofer-- completed 4 days    #  Elevated Troponin , type 2 MI/  Sinus tachycardia- c/w  metoprolol  - TTE 2/19 normal EF no DD or valve abnormalities    # h/o duodenal ulceration  continue PPI daily    # Down Syndrome/  Cerebral Palsy/ functional quadriplegia   - supportive care  - prevent falls and aspiration   - failed speech and swallow, might  need  PEG ( see above)  - on Raloxifene     # multiple pressure ulcers- continue wound care per recommendation from wound care; d/w nursing staff    # hypotension- on midodrine; BP low-- not septic-- labs are stable --   pending PEG today

## 2024-04-02 NOTE — PRE-OP CHECKLIST - HEIGHT IN CM
136.4
Casey Winn  INTERNAL MEDICINE  [address] [place] [address]  Phone: [phone]  Fax: [phone]  Follow Up Time: 1 week  
134
136.4

## 2024-04-02 NOTE — PROGRESS NOTE ADULT - SUBJECTIVE AND OBJECTIVE BOX
SUBJECTIVE:    Patient is a 58y old Female who presents with a chief complaint of Respiratory Distress (02 Apr 2024 13:41)    Currently admitted to medicine with the primary diagnosis of Sepsis with acute hypoxic respiratory failure       Today is hospital day 73d.     PAST MEDICAL & SURGICAL HISTORY  Down syndrome    Osteoporosis    Mild anemia    Neuropathy    S/P debridement  of R hip on 3/2/21      ALLERGIES:  No Known Allergies    MEDICATIONS:  STANDING MEDICATIONS  acetylcysteine 20%  Inhalation 4 milliLiter(s) Inhalation every 6 hours  albuterol    0.083% 2.5 milliGRAM(s) Nebulizer every 6 hours  albuterol    90 MICROgram(s) HFA Inhaler 1 Puff(s) Inhalation every 4 hours  AQUAPHOR (petrolatum Ointment) 1 Application(s) Topical two times a day  artificial  tears Solution 1 Drop(s) Both EYES two times a day  chlorhexidine 2% Cloths 1 Application(s) Topical daily  erythromycin   Ointment 1 Application(s) Both EYES daily  gabapentin 300 milliGRAM(s) Oral every 12 hours  levETIRAcetam  Solution 500 milliGRAM(s) Oral two times a day  melatonin 3 milliGRAM(s) Oral at bedtime  midodrine. 20 milliGRAM(s) Oral three times a day  pantoprazole  Injectable 40 milliGRAM(s) IV Push every 24 hours  polyethylene glycol 3350 17 Gram(s) Oral at bedtime  raloxifene 60 milliGRAM(s) Oral daily  saccharomyces boulardii 250 milliGRAM(s) Oral two times a day  senna 2 Tablet(s) Oral at bedtime  sodium chloride 0.65% Nasal 2 Spray(s) Both Nostrils three times a day    PRN MEDICATIONS  acetaminophen     Tablet .. 650 milliGRAM(s) Oral every 8 hours PRN  aluminum hydroxide/magnesium hydroxide/simethicone Suspension 30 milliLiter(s) Oral every 4 hours PRN  ondansetron Injectable 4 milliGRAM(s) IV Push every 8 hours PRN    VITALS:   T(F): 97.5  HR: 66  BP: 101/55  RR: 15  SpO2: 96%    LABS:                        9.6    7.80  )-----------( 443      ( 02 Apr 2024 06:42 )             32.3     04-02    142  |  103  |  14  ----------------------------<  128<H>  5.0   |  25  |  0.6<L>    Ca    9.4      02 Apr 2024 06:42  Mg     2.4     04-01    TPro  6.8  /  Alb  3.4<L>  /  TBili  0.5  /  DBili  x   /  AST  17  /  ALT  15  /  AlkPhos  104  04-02    PT/INR - ( 02 Apr 2024 06:42 )   PT: 11.60 sec;   INR: 1.02 ratio         PTT - ( 02 Apr 2024 06:42 )  PTT:34.0 sec  Urinalysis Basic - ( 02 Apr 2024 06:42 )    Color: x / Appearance: x / SG: x / pH: x  Gluc: 128 mg/dL / Ketone: x  / Bili: x / Urobili: x   Blood: x / Protein: x / Nitrite: x   Leuk Esterase: x / RBC: x / WBC x   Sq Epi: x / Non Sq Epi: x / Bacteria: x                RADIOLOGY:    PHYSICAL EXAM:  GEN: No acute distress  LUNGS: Clear to auscultation bilaterally   HEART: S1/S2 present. RRR.   ABD/ GI: Soft, non-tender, non-distended. Bowel sounds present  EXT: contracted  NEURO: Awake

## 2024-04-02 NOTE — PROGRESS NOTE ADULT - ASSESSMENT
58F w/ PMHx Down Syndrome, nonverbal at baseline, Hypothyroidism, Cerebral palsy and Seizure Disorders presents to the ED [facility] presented to ED for respiratory distress and high grade fever. Patient found to be septic on admission. Admitted to SDU for management of acute respiratory distress 2/2 CAP/Aspiration PNA (20 Jan 2024 18:22)  While pt was on the floor she developed dyspnea; consulted by pulmonary/critical care and was upgraded back to SDU 3/14. During CEU, she was tapered off of HFNC and complete course of antibiotics. She was stable for downgrade on 3/17.     # Sepsis POA / Acute hypoxic resp failure / RSV bronchiolitis /  Suspected aspiration    - On RA now ,no difficulty breathing    - continue midodrine 20 Q8H  - completed caspo (end 3/10) per ID   - c/w albuterol  - Completed  meropenem 1g q8h IV and  levaquin 750mg q24h; finished  on 3/20.     # H/o Dysphagia:   - Failed FEES, was on NG feeds  - gi consulted for PEG ,  emilio ([id number]) for consent - consent obtained>> Endoscopic PEG placment done ( 04/02)     #  Elevated Troponin , type 2 MI/  Sinus tachycardia  - Repeat EKG: Normal sinus rhythm  - Tachycardia resolved S/P Metoprolol.   - TTE 2/19 normal EF no DD or valve abnormalities    # Seizure Disorder  - c/w  Keppra   - seizure precautions  - keep Mg above 2.0     # H/o lower ext DVT   - Lovenox on hold for PEG placement.   - Will restart Lovenox after GI Clearance.     # Normocytic Anemia, anemia of chronic disease   - monitor H/H, keep Hb above 7.5     # Down Syndrome/  Cerebral Palsy/ functional quadriplegia   - supportive care  - prevent falls and aspiration     #MISC:   DVT : Lovenox now on hold   Diet : NG tube feeding for today , start G tube feeds from tomorrow.   Activity: Ambulate as tolerated  GI : Pantoprazole      Pending : Placement.

## 2024-04-03 PROCEDURE — 99232 SBSQ HOSP IP/OBS MODERATE 35: CPT

## 2024-04-03 RX ORDER — ENOXAPARIN SODIUM 100 MG/ML
50 INJECTION SUBCUTANEOUS EVERY 12 HOURS
Refills: 0 | Status: DISCONTINUED | OUTPATIENT
Start: 2024-04-03 | End: 2024-04-04

## 2024-04-03 RX ADMIN — GABAPENTIN 300 MILLIGRAM(S): 400 CAPSULE ORAL at 05:23

## 2024-04-03 RX ADMIN — Medication 1 DROP(S): at 17:40

## 2024-04-03 RX ADMIN — GABAPENTIN 300 MILLIGRAM(S): 400 CAPSULE ORAL at 17:40

## 2024-04-03 RX ADMIN — ENOXAPARIN SODIUM 50 MILLIGRAM(S): 100 INJECTION SUBCUTANEOUS at 17:37

## 2024-04-03 RX ADMIN — Medication 1 APPLICATION(S): at 05:21

## 2024-04-03 RX ADMIN — Medication 4 MILLILITER(S): at 20:44

## 2024-04-03 RX ADMIN — PANTOPRAZOLE SODIUM 40 MILLIGRAM(S): 20 TABLET, DELAYED RELEASE ORAL at 05:22

## 2024-04-03 RX ADMIN — Medication 2 SPRAY(S): at 21:21

## 2024-04-03 RX ADMIN — Medication 1 DROP(S): at 05:23

## 2024-04-03 RX ADMIN — MIDODRINE HYDROCHLORIDE 20 MILLIGRAM(S): 2.5 TABLET ORAL at 11:34

## 2024-04-03 RX ADMIN — LEVETIRACETAM 500 MILLIGRAM(S): 250 TABLET, FILM COATED ORAL at 05:21

## 2024-04-03 RX ADMIN — Medication 250 MILLIGRAM(S): at 17:40

## 2024-04-03 RX ADMIN — CHLORHEXIDINE GLUCONATE 1 APPLICATION(S): 213 SOLUTION TOPICAL at 11:38

## 2024-04-03 RX ADMIN — RALOXIFENE HYDROCHLORIDE 60 MILLIGRAM(S): 60 TABLET, COATED ORAL at 11:35

## 2024-04-03 RX ADMIN — ALBUTEROL 2.5 MILLIGRAM(S): 90 AEROSOL, METERED ORAL at 09:10

## 2024-04-03 RX ADMIN — Medication 4 MILLILITER(S): at 09:10

## 2024-04-03 RX ADMIN — Medication 1 APPLICATION(S): at 11:35

## 2024-04-03 RX ADMIN — MIDODRINE HYDROCHLORIDE 20 MILLIGRAM(S): 2.5 TABLET ORAL at 05:21

## 2024-04-03 RX ADMIN — ALBUTEROL 2.5 MILLIGRAM(S): 90 AEROSOL, METERED ORAL at 14:38

## 2024-04-03 RX ADMIN — ALBUTEROL 2.5 MILLIGRAM(S): 90 AEROSOL, METERED ORAL at 20:44

## 2024-04-03 RX ADMIN — Medication 250 MILLIGRAM(S): at 05:23

## 2024-04-03 RX ADMIN — Medication 2 SPRAY(S): at 05:21

## 2024-04-03 RX ADMIN — Medication 3 MILLIGRAM(S): at 21:20

## 2024-04-03 RX ADMIN — Medication 4 MILLILITER(S): at 14:38

## 2024-04-03 RX ADMIN — SENNA PLUS 2 TABLET(S): 8.6 TABLET ORAL at 21:21

## 2024-04-03 RX ADMIN — LEVETIRACETAM 500 MILLIGRAM(S): 250 TABLET, FILM COATED ORAL at 17:39

## 2024-04-03 RX ADMIN — POLYETHYLENE GLYCOL 3350 17 GRAM(S): 17 POWDER, FOR SOLUTION ORAL at 21:21

## 2024-04-03 RX ADMIN — Medication 1 APPLICATION(S): at 17:38

## 2024-04-03 NOTE — PROGRESS NOTE ADULT - TIME-BASED
45
50
50
55
55
35
37
45
45
50
50
60
35
45
55
55
50
60
45
50
60
35
45
51
55
35
35
45
50
60
45
50
55

## 2024-04-03 NOTE — PROGRESS NOTE ADULT - SUBJECTIVE AND OBJECTIVE BOX
24H events:    Patient is a 58y old Female who presents with a chief complaint of Respiratory Distress (03 Apr 2024 09:14)    Primary diagnosis of Sepsis with acute hypoxic respiratory failure      Today is hospital day 74d. This morning patient was seen and examined at bedside, resting comfortably in bed.    No acute or major events overnight.        PAST MEDICAL & SURGICAL HISTORY  Down syndrome    Osteoporosis    Mild anemia    Neuropathy    S/P debridement  of R hip on 3/2/21      SOCIAL HISTORY:  Social History:      ALLERGIES:  No Known Allergies    MEDICATIONS:  STANDING MEDICATIONS  acetylcysteine 20%  Inhalation 4 milliLiter(s) Inhalation every 6 hours  albuterol    0.083% 2.5 milliGRAM(s) Nebulizer every 6 hours  albuterol    90 MICROgram(s) HFA Inhaler 1 Puff(s) Inhalation every 4 hours  AQUAPHOR (petrolatum Ointment) 1 Application(s) Topical two times a day  artificial  tears Solution 1 Drop(s) Both EYES two times a day  chlorhexidine 2% Cloths 1 Application(s) Topical daily  enoxaparin Injectable 50 milliGRAM(s) SubCutaneous every 12 hours  erythromycin   Ointment 1 Application(s) Both EYES daily  gabapentin 300 milliGRAM(s) Oral every 12 hours  levETIRAcetam  Solution 500 milliGRAM(s) Oral two times a day  melatonin 3 milliGRAM(s) Oral at bedtime  midodrine. 20 milliGRAM(s) Oral three times a day  pantoprazole  Injectable 40 milliGRAM(s) IV Push every 24 hours  polyethylene glycol 3350 17 Gram(s) Oral at bedtime  raloxifene 60 milliGRAM(s) Oral daily  saccharomyces boulardii 250 milliGRAM(s) Oral two times a day  senna 2 Tablet(s) Oral at bedtime  sodium chloride 0.65% Nasal 2 Spray(s) Both Nostrils three times a day    PRN MEDICATIONS  acetaminophen     Tablet .. 650 milliGRAM(s) Oral every 8 hours PRN  aluminum hydroxide/magnesium hydroxide/simethicone Suspension 30 milliLiter(s) Oral every 4 hours PRN  ondansetron Injectable 4 milliGRAM(s) IV Push every 8 hours PRN    VITALS:   T(F): 96.5  HR: 71  BP: 90/50  RR: 18  SpO2: 95%    PHYSICAL EXAM:  GENERAL:   ( x ) NAD, lying in bed comfortably     (  ) obtunded     (  ) lethargic     (  ) somnolent    HEAD:   (  ) Atraumatic     (  ) hematoma     (  ) laceration (specify location:       )     NECK:  (  ) Supple     (  ) neck stiffness     (  ) nuchal rigidity     (  )  no JVD     (  ) JVD present ( -- cm)    HEART:  Rate -->     ( x ) normal rate     (  ) bradycardic     (  ) tachycardic  Rhythm -->     (  ) regular     (  ) regularly irregular     (  ) irregularly irregular  Murmurs -->     (  ) normal s1s2     ( x ) systolic murmur     (  ) diastolic murmur     (  ) continuous murmur      (  ) S3 present     (  ) S4 present    LUNGS:   ( x )Unlabored respirations     (  ) tachypnea  ( x ) B/L air entry     (  ) decreased breath sounds in:  (location     )    ( x ) no adventitious sound     (  ) crackles     (  ) wheezing      (  ) rhonchi      (specify location:       )  (  ) chest wall tenderness (specify location:       )    ABDOMEN:   ( x ) Soft     (  ) tense   |   ( x ) nondistended     (  ) distended   |   (  ) +BS     (  ) hypoactive bowel sounds     (  ) hyperactive bowel sounds  ( x ) nontender     (  ) RUQ tenderness     (  ) RLQ tenderness     (  ) LLQ tenderness     (  ) epigastric tenderness     (  ) diffuse tenderness  (  ) Splenomegaly      (  ) Hepatomegaly      (  ) Jaundice     (  ) ecchymosis     EXTREMITIES:  ( x ) Normal     (  ) Rash     (  ) ecchymosis     (  ) varicose veins      (  ) pitting edema     (  ) non-pitting edema   (  ) ulceration     (  ) gangrene:     (location:     )    NERVOUS SYSTEM:    ( x ) A&Ox0     (  ) confused     (  ) lethargic  CN II-XII:     (  ) Intact     (  ) deficits found     (Specify:     )   Upper extremities:     (  ) no sensorimotor deficits     (  ) weakness     (  ) loss of proprioception/vibration     (  ) loss of touch/temperature (specify:    )  Lower extremities:     (  ) no sensorimotor deficits     (  ) weakness     (  ) loss of proprioception/vibration     (  ) loss of touch/temperature (specify:    )    SKIN:   (  ) No rashes or lesions     (  ) maculopapular rash     (  ) pustules     (  ) vesicles     (  ) ulcer     (  ) ecchymosis     (specify location:     )      LABS:                        9.6    7.80  )-----------( 443      ( 02 Apr 2024 06:42 )             32.3     04-02    142  |  103  |  14  ----------------------------<  128<H>  5.0   |  25  |  0.6<L>    Ca    9.4      02 Apr 2024 06:42    TPro  6.8  /  Alb  3.4<L>  /  TBili  0.5  /  DBili  x   /  AST  17  /  ALT  15  /  AlkPhos  104  04-02    PT/INR - ( 02 Apr 2024 06:42 )   PT: 11.60 sec;   INR: 1.02 ratio         PTT - ( 02 Apr 2024 06:42 )  PTT:34.0 sec  Urinalysis Basic - ( 02 Apr 2024 06:42 )    Color: x / Appearance: x / SG: x / pH: x  Gluc: 128 mg/dL / Ketone: x  / Bili: x / Urobili: x   Blood: x / Protein: x / Nitrite: x   Leuk Esterase: x / RBC: x / WBC x   Sq Epi: x / Non Sq Epi: x / Bacteria: x

## 2024-04-03 NOTE — PROGRESS NOTE ADULT - ASSESSMENT
58F w/ PMHx Down Syndrome, nonverbal at baseline, Hypothyroidism, Cerebral palsy and Seizure Disorders presents to the ED from nursing facility/group home ([facility]) presented to ED for respiratory distress and high grade fever. Patient found to be septic on admission. Admitted to SDU for management of acute respiratory distress 2/2 CAP/Aspiration PNA (20 Jan 2024 18:22)  While pt was on the floor she developed dyspnea after swallow evaluation, was spiking high grade fever, upgraded to SDU. Patient was being managed for acute hypoxic respiratory failure required requiring oxygen supplement Patient failed speech and swallow evaluation.  SLP recommended n.p.o. with plan oral means of nutrition.  GI consulted to consider  PEG placement.    # Oropharyngeal dysphagia s/p PEG evaluation  speech and swallow recommendation.  N.p.o. with nonoral means of nutrition.  Consent was obtained    PLAN   - Can use G tube for medications and  feeding today    - Flush with 50 cc of water before and after use.  - Avoid placing more than one gauze between skin and the external flange.    - Skin care per RN.   - resume all necessary AC   - call as needed      58F w/ PMHx Down Syndrome, nonverbal at baseline, Hypothyroidism, Cerebral palsy and Seizure Disorders presents to the ED from nursing facility/group home ([facility]) presented to ED for respiratory distress and high grade fever. Patient found to be septic on admission. Admitted to SDU for management of acute respiratory distress 2/2 CAP/Aspiration PNA (20 Jan 2024 18:22)  While pt was on the floor she developed dyspnea after swallow evaluation, was spiking high grade fever, upgraded to SDU. Patient was being managed for acute hypoxic respiratory failure required requiring oxygen supplement Patient failed speech and swallow evaluation.  SLP recommended n.p.o. with plan oral means of nutrition.  GI consulted to consider  PEG placement.    # Oropharyngeal dysphagia s/p PEG evaluation  speech and swallow recommendation.  N.p.o. with nonoral means of nutrition.  Consent was obtained  EGD/PEG performed on 4/2/24    PLAN   - Can use G tube for medications and  feeding today    - Flush with 50 cc of water before and after use.  - Avoid placing more than one gauze between skin and the external flange.    - Skin care per RN.   - resume all necessary AC as deemed indicated per team  - call as needed

## 2024-04-03 NOTE — PROGRESS NOTE ADULT - ASSESSMENT
58F w/ PMHx Down Syndrome, nonverbal at baseline, Hypothyroidism, Cerebral palsy and Seizure Disorders presents [facility] the ED [facility] presented to ED for respiratory distress and high grade fever. Patient found to be septic on admission. Admitted to SDU for management of acute respiratory distress 2/2 CAP/Aspiration PNA (20 Jan 2024 18:22)  While pt was on the floor she developed dyspnea; consulted by pulmonary/critical care and was upgraded back to SDU 3/14. During CEU, she was tapered off of HFNC and complete course of antibiotics. She was stable for downgrade on 3/17.     # Sepsis POA / Acute hypoxic resp failure / RSV bronchiolitis /  Suspected aspiration    - On 2 L NC  now ,no difficulty breathing  , Tried weaning her off the O2 , unsucessfull , 86% on RA   - continue midodrine 20 Q8H  - completed caspo (end 3/10) per ID   - c/w albuterol  - Completed  meropenem 1g q8h IV and  levaquin 750mg q24h; finished  on 3/20.     # H/o Dysphagia:   - Failed FEES, was on NG feeds  - gi consulted for PEG ,  emilio ([id number]) for consent - consent obtained>> Endoscopic PEG placment done ( 04/02)   - Started PEG feeding   - MOnitor for feed tolerance     #  Elevated Troponin , type 2 MI/  Sinus tachycardia  - Repeat EKG: Normal sinus rhythm  - Tachycardia resolved S/P Metoprolol.   - TTE 2/19 normal EF no DD or valve abnormalities    # Seizure Disorder  - c/w  Keppra   - seizure precautions  - keep Mg above 2.0     # H/o lower ext DVT   - Started on Lovenox     # Normocytic Anemia, anemia of chronic disease   - monitor H/H, keep Hb above 7.5     # Down Syndrome/  Cerebral Palsy/ functional quadriplegia   - supportive care  - prevent falls and aspiration     #MISC:   DVT : Lovenox   Diet : G tube Feeding   Activity: Ambulate as tolerated  GI : Pantoprazole      Pending :   Wean off the O2

## 2024-04-03 NOTE — PROGRESS NOTE ADULT - TIME BILLING
Direct clinical care,  coordination with nursing and case management/social work teams, review of official communication documents and scheduled medications,  and resident supervision.
direct pt care
time spent on review of labs, imaging studies, old records, obtaining history, personally examining patient, counselling and communicating with patient, entering orders for medications/tests/etc, discussions with other health care providers, documentation in electronic health records, independent interpretation of labs, imaging/procedure results and care coordination.
I have personally seen and examined this patient.    I have reviewed all pertinent clinical information and reviewed all relevant imaging and diagnostic studies personally.   I discussed recommendations with the primary team.
direct pt care
time spent on review of labs, imaging studies, old records, obtaining history, personally examining patient, counselling and communicating with patient, entering orders for medications/tests/etc, discussions with other health care providers, documentation in electronic health records, independent interpretation of labs, imaging/procedure results and care coordination.
Direct clinical care,  coordination with nursing and case management/social work teams, review of official communication documents and scheduled medications,  and resident supervision.
direct pt care
time spent on review of labs, imaging studies, old records, obtaining history, personally examining patient, counselling and communicating with patient, entering orders for medications/tests/etc, discussions with other health care providers, documentation in electronic health records, independent interpretation of labs, imaging/procedure results and care coordination.
I have personally seen and examined this patient.    I have reviewed all pertinent clinical information and reviewed all relevant imaging and diagnostic studies personally.   I discussed recommendations with the primary team.
I have personally seen and examined this patient.  I have reviewed all pertinent clinical information and reviewed all relevant imaging and diagnostic studies personally.   If possible, I counseled the patient about diagnostic testing and treatment plan.   I discussed my recommendations with the primary team and any family members at bedside.
I have personally seen and examined this patient.  I have reviewed all pertinent clinical information and reviewed all relevant imaging and diagnostic studies personally.   If possible, I counseled the patient about diagnostic testing and treatment plan.   I discussed my recommendations with the primary team and any family members at bedside.
direct pt care
time spent on review of labs, imaging studies, old records, obtaining history, personally examining patient, counselling and communicating with patient, entering orders for medications/tests/etc, discussions with other health care providers, documentation in electronic health records, independent interpretation of labs, imaging/procedure results and care coordination.
Patient seen at bedside, time spent evaluating and treating the patient's acute illness as well as time spent reviewing labs, radiology, discussing with patient and/or patient's family and discussing the case with a multidisciplinary team.
direct pt care
time spent on review of labs, imaging studies, old records, obtaining history, personally examining patient, counselling and communicating with patient, entering orders for medications/tests/etc, discussions with other health care providers, documentation in electronic health records, independent interpretation of labs, imaging/procedure results and care coordination.
direct pt care
direct pt care
Coordination of care
Coordination of care
time spent on review of labs, imaging studies, old records, obtaining history, personally examining patient, counselling and communicating with patient, entering orders for medications/tests/etc, discussions with other health care providers, documentation in electronic health records, independent interpretation of labs, imaging/procedure results and care coordination.

## 2024-04-03 NOTE — PROGRESS NOTE ADULT - SUBJECTIVE AND OBJECTIVE BOX
Gastroenterology progress note:     Patient is a 58y old  Female who presents with a chief complaint of Respiratory Distress (02 Apr 2024 16:00)     Admitted on: 01-20-24    We are following the patient for s/p PEG placement     no overnight  events       PAST MEDICAL & SURGICAL HISTORY:  Down syndrome      Osteoporosis      Mild anemia      Neuropathy      S/P debridement  of R hip on 3/2/21          MEDICATIONS  (STANDING):  acetylcysteine 20%  Inhalation 4 milliLiter(s) Inhalation every 6 hours  albuterol    0.083% 2.5 milliGRAM(s) Nebulizer every 6 hours  albuterol    90 MICROgram(s) HFA Inhaler 1 Puff(s) Inhalation every 4 hours  AQUAPHOR (petrolatum Ointment) 1 Application(s) Topical two times a day  artificial  tears Solution 1 Drop(s) Both EYES two times a day  chlorhexidine 2% Cloths 1 Application(s) Topical daily  erythromycin   Ointment 1 Application(s) Both EYES daily  gabapentin 300 milliGRAM(s) Oral every 12 hours  levETIRAcetam  Solution 500 milliGRAM(s) Oral two times a day  melatonin 3 milliGRAM(s) Oral at bedtime  midodrine. 20 milliGRAM(s) Oral three times a day  pantoprazole  Injectable 40 milliGRAM(s) IV Push every 24 hours  polyethylene glycol 3350 17 Gram(s) Oral at bedtime  raloxifene 60 milliGRAM(s) Oral daily  saccharomyces boulardii 250 milliGRAM(s) Oral two times a day  senna 2 Tablet(s) Oral at bedtime  sodium chloride 0.65% Nasal 2 Spray(s) Both Nostrils three times a day    MEDICATIONS  (PRN):  acetaminophen     Tablet .. 650 milliGRAM(s) Oral every 8 hours PRN Temp greater or equal to 38.5C (101.3F)  aluminum hydroxide/magnesium hydroxide/simethicone Suspension 30 milliLiter(s) Oral every 4 hours PRN Dyspepsia  ondansetron Injectable 4 milliGRAM(s) IV Push every 8 hours PRN Nausea and/or Vomiting      Allergies  No Known Allergies      Review of Systems:   Cardiovascular:  No Chest Pain, No Palpitations  Respiratory:  No Cough, No Dyspnea  Gastrointestinal:  As described in HPI  Skin:  No Skin Lesions, No Jaundice  Neuro:  No Syncope, No Dizziness    Physical Examination:  T(C): 35.8 (04-03-24 @ 07:43), Max: 36.7 (04-02-24 @ 09:38)  HR: 77 (04-03-24 @ 07:43) (64 - 92)  BP: 154/62 (04-03-24 @ 07:43) (96/52 - 154/62)  RR: 18 (04-03-24 @ 07:43) (15 - 19)  SpO2: 99% (04-03-24 @ 07:43) (89% - 99%)  Weight (kg): 52 (04-02-24 @ 12:18)      GENERAL: no acute distress.  HEAD:  Atraumatic, Normocephalic  EYES: conjunctiva and sclera clear  NECK: Supple, no JVD or thyromegaly  CHEST/LUNG: Clear to auscultation bilaterally  HEART: Regular rate and rhythm; normal S1, S2, No murmurs.  ABDOMEN: Soft, nontender, nondistended, normal skin, no bleeding or skin changes at the  g tube site   NEUROLOGY: AAOx0  SKIN: Intact, no jaundice     Data:                        9.6    7.80  )-----------( 443      ( 02 Apr 2024 06:42 )             32.3     Hgb trend:  9.6  04-02-24 @ 06:42  10.2  04-01-24 @ 07:06        04-02    142  |  103  |  14  ----------------------------<  128<H>  5.0   |  25  |  0.6<L>    Ca    9.4      02 Apr 2024 06:42    TPro  6.8  /  Alb  3.4<L>  /  TBili  0.5  /  DBili  x   /  AST  17  /  ALT  15  /  AlkPhos  104  04-02    Liver panel trend:  TBili 0.5   /   AST 17   /   ALT 15   /   AlkP 104   /   Tptn 6.8   /   Alb 3.4    /   DBili --      04-02  TBili 0.2   /   AST 18   /   ALT 13   /   AlkP 102   /   Tptn 6.8   /   Alb 3.4    /   DBili --      04-01  TBili 0.2   /   AST 14   /   ALT 10   /   AlkP 100   /   Tptn 6.0   /   Alb 3.1    /   DBili --      03-30  TBili 0.3   /   AST 15   /   ALT 10   /   AlkP 99   /   Tptn 6.5   /   Alb 3.4    /   DBili --      03-29  TBili 0.2   /   AST 11   /   ALT 9   /   AlkP 96   /   Tptn 5.9   /   Alb 3.0    /   DBili --      03-28  TBili 0.3   /   AST 11   /   ALT 8   /   AlkP 84   /   Tptn 5.8   /   Alb 3.0    /   DBili --      03-27  TBili 0.2   /   AST 13   /   ALT 10   /   AlkP 96   /   Tptn 6.7   /   Alb 3.2    /   DBili --      03-26  TBili 0.4   /   AST 14   /   ALT 9   /   AlkP 97   /   Tptn 6.5   /   Alb 3.2    /   DBili --      03-25      PT/INR - ( 02 Apr 2024 06:42 )   PT: 11.60 sec;   INR: 1.02 ratio         PTT - ( 02 Apr 2024 06:42 )  PTT:34.0 sec       Radiology:       Gastroenterology progress note:     Patient is a 58y old  Female who presents with a chief complaint of Respiratory Distress (02 Apr 2024 16:00)     Admitted on: 01-20-24    We are following the patient for s/p PEG placement     no overnight  events       PAST MEDICAL & SURGICAL HISTORY:  Down syndrome      Osteoporosis      Mild anemia      Neuropathy      S/P debridement  of R hip on 3/2/21          MEDICATIONS  (STANDING):  acetylcysteine 20%  Inhalation 4 milliLiter(s) Inhalation every 6 hours  albuterol    0.083% 2.5 milliGRAM(s) Nebulizer every 6 hours  albuterol    90 MICROgram(s) HFA Inhaler 1 Puff(s) Inhalation every 4 hours  AQUAPHOR (petrolatum Ointment) 1 Application(s) Topical two times a day  artificial  tears Solution 1 Drop(s) Both EYES two times a day  chlorhexidine 2% Cloths 1 Application(s) Topical daily  erythromycin   Ointment 1 Application(s) Both EYES daily  gabapentin 300 milliGRAM(s) Oral every 12 hours  levETIRAcetam  Solution 500 milliGRAM(s) Oral two times a day  melatonin 3 milliGRAM(s) Oral at bedtime  midodrine. 20 milliGRAM(s) Oral three times a day  pantoprazole  Injectable 40 milliGRAM(s) IV Push every 24 hours  polyethylene glycol 3350 17 Gram(s) Oral at bedtime  raloxifene 60 milliGRAM(s) Oral daily  saccharomyces boulardii 250 milliGRAM(s) Oral two times a day  senna 2 Tablet(s) Oral at bedtime  sodium chloride 0.65% Nasal 2 Spray(s) Both Nostrils three times a day    MEDICATIONS  (PRN):  acetaminophen     Tablet .. 650 milliGRAM(s) Oral every 8 hours PRN Temp greater or equal to 38.5C (101.3F)  aluminum hydroxide/magnesium hydroxide/simethicone Suspension 30 milliLiter(s) Oral every 4 hours PRN Dyspepsia  ondansetron Injectable 4 milliGRAM(s) IV Push every 8 hours PRN Nausea and/or Vomiting      Allergies  No Known Allergies      Review of Systems:   Cardiovascular:  No Chest Pain, No Palpitations  Respiratory:  No Cough, No Dyspnea  Gastrointestinal:  As described in HPI  Skin:  No Skin Lesions, No Jaundice  Neuro:  No Syncope, No Dizziness    Physical Examination:  T(C): 35.8 (04-03-24 @ 07:43), Max: 36.7 (04-02-24 @ 09:38)  HR: 77 (04-03-24 @ 07:43) (64 - 92)  BP: 154/62 (04-03-24 @ 07:43) (96/52 - 154/62)  RR: 18 (04-03-24 @ 07:43) (15 - 19)  SpO2: 99% (04-03-24 @ 07:43) (89% - 99%)  Weight (kg): 52 (04-02-24 @ 12:18)      GENERAL: no acute distress.  HEAD:  Atraumatic, Normocephalic  EYES: conjunctiva and sclera clear  NECK: Supple, no JVD or thyromegaly  CHEST/LUNG: Clear to auscultation bilaterally  HEART: Regular rate and rhythm; normal S1, S2, No murmurs.  ABDOMEN: Soft, nontender, nondistended, normal skin, no bleeding or skin changes at the g tube site   NEUROLOGY: AAOx0  SKIN: Intact, no jaundice     Data:                        9.6    7.80  )-----------( 443      ( 02 Apr 2024 06:42 )             32.3     Hgb trend:  9.6  04-02-24 @ 06:42  10.2  04-01-24 @ 07:06        04-02    142  |  103  |  14  ----------------------------<  128<H>  5.0   |  25  |  0.6<L>    Ca    9.4      02 Apr 2024 06:42    TPro  6.8  /  Alb  3.4<L>  /  TBili  0.5  /  DBili  x   /  AST  17  /  ALT  15  /  AlkPhos  104  04-02    Liver panel trend:  TBili 0.5   /   AST 17   /   ALT 15   /   AlkP 104   /   Tptn 6.8   /   Alb 3.4    /   DBili --      04-02  TBili 0.2   /   AST 18   /   ALT 13   /   AlkP 102   /   Tptn 6.8   /   Alb 3.4    /   DBili --      04-01  TBili 0.2   /   AST 14   /   ALT 10   /   AlkP 100   /   Tptn 6.0   /   Alb 3.1    /   DBili --      03-30  TBili 0.3   /   AST 15   /   ALT 10   /   AlkP 99   /   Tptn 6.5   /   Alb 3.4    /   DBili --      03-29  TBili 0.2   /   AST 11   /   ALT 9   /   AlkP 96   /   Tptn 5.9   /   Alb 3.0    /   DBili --      03-28  TBili 0.3   /   AST 11   /   ALT 8   /   AlkP 84   /   Tptn 5.8   /   Alb 3.0    /   DBili --      03-27  TBili 0.2   /   AST 13   /   ALT 10   /   AlkP 96   /   Tptn 6.7   /   Alb 3.2    /   DBili --      03-26  TBili 0.4   /   AST 14   /   ALT 9   /   AlkP 97   /   Tptn 6.5   /   Alb 3.2    /   DBili --      03-25      PT/INR - ( 02 Apr 2024 06:42 )   PT: 11.60 sec;   INR: 1.02 ratio         PTT - ( 02 Apr 2024 06:42 )  PTT:34.0 sec       Radiology:

## 2024-04-03 NOTE — PROGRESS NOTE ADULT - ATTENDING COMMENTS
Gi recommendation reviewed  starting on tube feed  consult dietician on tube feed recommendation  wean off oxygen as tolerated  discharge plan if toelrating tube feeds

## 2024-04-04 LAB
ALBUMIN SERPL ELPH-MCNC: 3.3 G/DL — LOW (ref 3.5–5.2)
ALP SERPL-CCNC: 106 U/L — SIGNIFICANT CHANGE UP (ref 30–115)
ALT FLD-CCNC: 23 U/L — SIGNIFICANT CHANGE UP (ref 0–41)
ANION GAP SERPL CALC-SCNC: 14 MMOL/L — SIGNIFICANT CHANGE UP (ref 7–14)
AST SERPL-CCNC: 23 U/L — SIGNIFICANT CHANGE UP (ref 0–41)
BASOPHILS # BLD AUTO: 0.03 K/UL — SIGNIFICANT CHANGE UP (ref 0–0.2)
BASOPHILS NFR BLD AUTO: 0.2 % — SIGNIFICANT CHANGE UP (ref 0–1)
BILIRUB SERPL-MCNC: 0.4 MG/DL — SIGNIFICANT CHANGE UP (ref 0.2–1.2)
BUN SERPL-MCNC: 13 MG/DL — SIGNIFICANT CHANGE UP (ref 10–20)
CALCIUM SERPL-MCNC: 9.3 MG/DL — SIGNIFICANT CHANGE UP (ref 8.4–10.5)
CHLORIDE SERPL-SCNC: 100 MMOL/L — SIGNIFICANT CHANGE UP (ref 98–110)
CO2 SERPL-SCNC: 24 MMOL/L — SIGNIFICANT CHANGE UP (ref 17–32)
CREAT SERPL-MCNC: 0.6 MG/DL — LOW (ref 0.7–1.5)
EGFR: 105 ML/MIN/1.73M2 — SIGNIFICANT CHANGE UP
EOSINOPHIL # BLD AUTO: 0.2 K/UL — SIGNIFICANT CHANGE UP (ref 0–0.7)
EOSINOPHIL NFR BLD AUTO: 1.4 % — SIGNIFICANT CHANGE UP (ref 0–8)
GLUCOSE BLDC GLUCOMTR-MCNC: 188 MG/DL — HIGH (ref 70–99)
GLUCOSE SERPL-MCNC: 163 MG/DL — HIGH (ref 70–99)
HCT VFR BLD CALC: 32.7 % — LOW (ref 37–47)
HGB BLD-MCNC: 10 G/DL — LOW (ref 12–16)
IMM GRANULOCYTES NFR BLD AUTO: 0.4 % — HIGH (ref 0.1–0.3)
LYMPHOCYTES # BLD AUTO: 1.54 K/UL — SIGNIFICANT CHANGE UP (ref 1.2–3.4)
LYMPHOCYTES # BLD AUTO: 10.9 % — LOW (ref 20.5–51.1)
MAGNESIUM SERPL-MCNC: 2.3 MG/DL — SIGNIFICANT CHANGE UP (ref 1.8–2.4)
MCHC RBC-ENTMCNC: 26.5 PG — LOW (ref 27–31)
MCHC RBC-ENTMCNC: 30.6 G/DL — LOW (ref 32–37)
MCV RBC AUTO: 86.5 FL — SIGNIFICANT CHANGE UP (ref 81–99)
MONOCYTES # BLD AUTO: 0.31 K/UL — SIGNIFICANT CHANGE UP (ref 0.1–0.6)
MONOCYTES NFR BLD AUTO: 2.2 % — SIGNIFICANT CHANGE UP (ref 1.7–9.3)
NEUTROPHILS # BLD AUTO: 11.93 K/UL — HIGH (ref 1.4–6.5)
NEUTROPHILS NFR BLD AUTO: 84.9 % — HIGH (ref 42.2–75.2)
NRBC # BLD: 0 /100 WBCS — SIGNIFICANT CHANGE UP (ref 0–0)
PHOSPHATE SERPL-MCNC: 3.2 MG/DL — SIGNIFICANT CHANGE UP (ref 2.1–4.9)
PLATELET # BLD AUTO: 379 K/UL — SIGNIFICANT CHANGE UP (ref 130–400)
PMV BLD: 10.7 FL — HIGH (ref 7.4–10.4)
POTASSIUM SERPL-MCNC: 4.2 MMOL/L — SIGNIFICANT CHANGE UP (ref 3.5–5)
POTASSIUM SERPL-SCNC: 4.2 MMOL/L — SIGNIFICANT CHANGE UP (ref 3.5–5)
PROT SERPL-MCNC: 6.6 G/DL — SIGNIFICANT CHANGE UP (ref 6–8)
RBC # BLD: 3.78 M/UL — LOW (ref 4.2–5.4)
RBC # FLD: 25.6 % — HIGH (ref 11.5–14.5)
SODIUM SERPL-SCNC: 138 MMOL/L — SIGNIFICANT CHANGE UP (ref 135–146)
WBC # BLD: 14.07 K/UL — HIGH (ref 4.8–10.8)
WBC # FLD AUTO: 14.07 K/UL — HIGH (ref 4.8–10.8)

## 2024-04-04 PROCEDURE — 71045 X-RAY EXAM CHEST 1 VIEW: CPT | Mod: 26

## 2024-04-04 PROCEDURE — 99232 SBSQ HOSP IP/OBS MODERATE 35: CPT

## 2024-04-04 RX ORDER — ENOXAPARIN SODIUM 100 MG/ML
40 INJECTION SUBCUTANEOUS EVERY 24 HOURS
Refills: 0 | Status: DISCONTINUED | OUTPATIENT
Start: 2024-04-04 | End: 2024-04-08

## 2024-04-04 RX ORDER — IPRATROPIUM/ALBUTEROL SULFATE 18-103MCG
3 AEROSOL WITH ADAPTER (GRAM) INHALATION EVERY 6 HOURS
Refills: 0 | Status: DISCONTINUED | OUTPATIENT
Start: 2024-04-04 | End: 2024-04-08

## 2024-04-04 RX ORDER — APIXABAN 2.5 MG/1
10 TABLET, FILM COATED ORAL EVERY 12 HOURS
Refills: 0 | Status: DISCONTINUED | OUTPATIENT
Start: 2024-04-04 | End: 2024-04-04

## 2024-04-04 RX ADMIN — Medication 250 MILLIGRAM(S): at 17:53

## 2024-04-04 RX ADMIN — Medication 1 APPLICATION(S): at 17:53

## 2024-04-04 RX ADMIN — Medication 3 MILLIGRAM(S): at 22:10

## 2024-04-04 RX ADMIN — LEVETIRACETAM 500 MILLIGRAM(S): 250 TABLET, FILM COATED ORAL at 05:37

## 2024-04-04 RX ADMIN — PANTOPRAZOLE SODIUM 40 MILLIGRAM(S): 20 TABLET, DELAYED RELEASE ORAL at 05:36

## 2024-04-04 RX ADMIN — LEVETIRACETAM 500 MILLIGRAM(S): 250 TABLET, FILM COATED ORAL at 17:51

## 2024-04-04 RX ADMIN — Medication 4 MILLILITER(S): at 15:08

## 2024-04-04 RX ADMIN — ALBUTEROL 2.5 MILLIGRAM(S): 90 AEROSOL, METERED ORAL at 09:37

## 2024-04-04 RX ADMIN — Medication 4 MILLILITER(S): at 19:58

## 2024-04-04 RX ADMIN — SENNA PLUS 2 TABLET(S): 8.6 TABLET ORAL at 22:10

## 2024-04-04 RX ADMIN — MIDODRINE HYDROCHLORIDE 20 MILLIGRAM(S): 2.5 TABLET ORAL at 17:52

## 2024-04-04 RX ADMIN — ALBUTEROL 2.5 MILLIGRAM(S): 90 AEROSOL, METERED ORAL at 15:03

## 2024-04-04 RX ADMIN — ENOXAPARIN SODIUM 50 MILLIGRAM(S): 100 INJECTION SUBCUTANEOUS at 05:37

## 2024-04-04 RX ADMIN — Medication 4 MILLILITER(S): at 09:37

## 2024-04-04 RX ADMIN — GABAPENTIN 300 MILLIGRAM(S): 400 CAPSULE ORAL at 17:54

## 2024-04-04 RX ADMIN — Medication 1 APPLICATION(S): at 11:34

## 2024-04-04 RX ADMIN — Medication 2 SPRAY(S): at 05:38

## 2024-04-04 RX ADMIN — GABAPENTIN 300 MILLIGRAM(S): 400 CAPSULE ORAL at 05:38

## 2024-04-04 RX ADMIN — Medication 1 DROP(S): at 05:38

## 2024-04-04 RX ADMIN — CHLORHEXIDINE GLUCONATE 1 APPLICATION(S): 213 SOLUTION TOPICAL at 11:36

## 2024-04-04 RX ADMIN — Medication 250 MILLIGRAM(S): at 05:39

## 2024-04-04 RX ADMIN — RALOXIFENE HYDROCHLORIDE 60 MILLIGRAM(S): 60 TABLET, COATED ORAL at 11:34

## 2024-04-04 RX ADMIN — Medication 2 SPRAY(S): at 14:05

## 2024-04-04 RX ADMIN — ALBUTEROL 2.5 MILLIGRAM(S): 90 AEROSOL, METERED ORAL at 19:58

## 2024-04-04 RX ADMIN — POLYETHYLENE GLYCOL 3350 17 GRAM(S): 17 POWDER, FOR SOLUTION ORAL at 22:09

## 2024-04-04 RX ADMIN — Medication 2 SPRAY(S): at 22:10

## 2024-04-04 RX ADMIN — MIDODRINE HYDROCHLORIDE 20 MILLIGRAM(S): 2.5 TABLET ORAL at 05:38

## 2024-04-04 RX ADMIN — Medication 1 APPLICATION(S): at 05:38

## 2024-04-04 NOTE — PROGRESS NOTE ADULT - SUBJECTIVE AND OBJECTIVE BOX
24H events:    Patient is a 58y old Female who presents with a chief complaint of Respiratory Distress (03 Apr 2024 13:23)    Primary diagnosis of Sepsis with acute hypoxic respiratory failure    Today is hospital day 75d. This morning patient was seen and examined at bedside, resting comfortably in bed.    No acute or major events overnight.      PAST MEDICAL & SURGICAL HISTORY  Down syndrome    Osteoporosis    Mild anemia    Neuropathy    S/P debridement  of R hip on 3/2/21      SOCIAL HISTORY:  Social History:      ALLERGIES:  No Known Allergies    MEDICATIONS:  STANDING MEDICATIONS  acetylcysteine 20%  Inhalation 4 milliLiter(s) Inhalation every 6 hours  albuterol    0.083% 2.5 milliGRAM(s) Nebulizer every 6 hours  albuterol    90 MICROgram(s) HFA Inhaler 1 Puff(s) Inhalation every 4 hours  albuterol/ipratropium for Nebulization 3 milliLiter(s) Nebulizer every 6 hours  apixaban 10 milliGRAM(s) Oral every 12 hours  AQUAPHOR (petrolatum Ointment) 1 Application(s) Topical two times a day  artificial  tears Solution 1 Drop(s) Both EYES two times a day  chlorhexidine 2% Cloths 1 Application(s) Topical daily  erythromycin   Ointment 1 Application(s) Both EYES daily  gabapentin 300 milliGRAM(s) Oral every 12 hours  levETIRAcetam  Solution 500 milliGRAM(s) Oral two times a day  melatonin 3 milliGRAM(s) Oral at bedtime  midodrine. 20 milliGRAM(s) Oral three times a day  pantoprazole  Injectable 40 milliGRAM(s) IV Push every 24 hours  polyethylene glycol 3350 17 Gram(s) Oral at bedtime  raloxifene 60 milliGRAM(s) Oral daily  saccharomyces boulardii 250 milliGRAM(s) Oral two times a day  senna 2 Tablet(s) Oral at bedtime  sodium chloride 0.65% Nasal 2 Spray(s) Both Nostrils three times a day    PRN MEDICATIONS  acetaminophen     Tablet .. 650 milliGRAM(s) Oral every 8 hours PRN  aluminum hydroxide/magnesium hydroxide/simethicone Suspension 30 milliLiter(s) Oral every 4 hours PRN  ondansetron Injectable 4 milliGRAM(s) IV Push every 8 hours PRN    VITALS:   T(F): 98.7  HR: 79  BP: 112/70  RR: 18  SpO2: 97%    PHYSICAL EXAM:  GENERAL:   ( x ) NAD, lying in bed comfortably     (  ) obtunded     (  ) lethargic     (  ) somnolent    HEAD:   (  ) Atraumatic     (  ) hematoma     (  ) laceration (specify location:       )     NECK:  (  ) Supple     (  ) neck stiffness     (  ) nuchal rigidity     (  )  no JVD     (  ) JVD present ( -- cm)    HEART:  Rate -->     ( x ) normal rate     (  ) bradycardic     (  ) tachycardic  Rhythm -->     (  ) regular     (  ) regularly irregular     (  ) irregularly irregular  Murmurs -->     (  ) normal s1s2     ( x ) systolic murmur     (  ) diastolic murmur     (  ) continuous murmur      (  ) S3 present     (  ) S4 present    LUNGS:   ( x )Unlabored respirations     (  ) tachypnea  ( x ) B/L air entry     (  ) decreased breath sounds in:  (location     )    ( x ) no adventitious sound     (  ) crackles     (  ) wheezing      (  ) rhonchi      (specify location:       )  (  ) chest wall tenderness (specify location:       )    ABDOMEN:   ( x ) Soft     (  ) tense   |   ( x ) nondistended     (  ) distended   |   (  ) +BS     (  ) hypoactive bowel sounds     (  ) hyperactive bowel sounds  ( x ) nontender     (  ) RUQ tenderness     (  ) RLQ tenderness     (  ) LLQ tenderness     (  ) epigastric tenderness     (  ) diffuse tenderness  (  ) Splenomegaly      (  ) Hepatomegaly      (  ) Jaundice     (  ) ecchymosis     EXTREMITIES:  ( x ) Normal     (  ) Rash     (  ) ecchymosis     (  ) varicose veins      (  ) pitting edema     (  ) non-pitting edema   (  ) ulceration     (  ) gangrene:     (location:     )    NERVOUS SYSTEM:    ( x ) A&Ox0     (  ) confused     (  ) lethargic  CN II-XII:     (  ) Intact     (  ) deficits found     (Specify:     )   Upper extremities:     (  ) no sensorimotor deficits     (  ) weakness     (  ) loss of proprioception/vibration     (  ) loss of touch/temperature (specify:    )  Lower extremities:     (  ) no sensorimotor deficits     (  ) weakness     (  ) loss of proprioception/vibration     (  ) loss of touch/temperature (specify:    )    SKIN:   (  ) No rashes or lesions     (  ) maculopapular rash     (  ) pustules     (  ) vesicles     (  ) ulcer     (  ) ecchymosis     (specify location:     )      LABS:                        10.0   14.07 )-----------( 379      ( 04 Apr 2024 07:45 )             32.7     04-04    138  |  100  |  13  ----------------------------<  163<H>  4.2   |  24  |  0.6<L>    Ca    9.3      04 Apr 2024 07:45  Phos  3.2     04-04  Mg     2.3     04-04    TPro  6.6  /  Alb  3.3<L>  /  TBili  0.4  /  DBili  x   /  AST  23  /  ALT  23  /  AlkPhos  106  04-04      Urinalysis Basic - ( 04 Apr 2024 07:45 )    Color: x / Appearance: x / SG: x / pH: x  Gluc: 163 mg/dL / Ketone: x  / Bili: x / Urobili: x   Blood: x / Protein: x / Nitrite: x   Leuk Esterase: x / RBC: x / WBC x   Sq Epi: x / Non Sq Epi: x / Bacteria: x

## 2024-04-04 NOTE — CHART NOTE - NSCHARTNOTEFT_GEN_A_CORE
Letter of Medical Necessity:     Patient Royer Elizabeth requires a nebulizer due to Diagnosis of Aspiration pneumonia with superimposed RSV bronchiolitis. Patient will use it with Duonebs every 6 hours and Mucomyst every 12 hours.

## 2024-04-04 NOTE — CHART NOTE - NSCHARTNOTEFT_GEN_A_CORE
Registered Dietitian Follow-Up     Patient Profile Reviewed                           Yes [x]   No []     Nutrition History Previously Obtained        Yes [x]  No []       Pertinent Subjective Information: RD consult placed for PEG feeds following PEG placement 4/2. Per RN patient is tolerating feeds, receiving 300 mL Q6H.      Pertinent Medical Interventions: 58F w/ PMHx Down Syndrome, nonverbal at baseline, Hypothyroidism, Cerebral palsy and Seizure Disorders [facility] presented to ED for respiratory distress and high grade fever. Patient found to be septic on admission. Admitted to SDU for management of acute respiratory distress 2/2 CAP/Aspiration PNA (20 Jan 2024 18:22)  While pt was on the floor she developed dyspnea; consulted by pulmonary/critical care and was upgraded back to SDU 3/14. During CEU, she was tapered off of HFNC and complete course of antibiotics. She was stable for downgrade on 3/17.   - Failed FEES, was on NG feeds  - gi consulted for PEG ,  Cha[place]in ([id number]) for consent - consent obtained>> Endoscopic PEG placment done ( 04/02)   - Started PEG feeding   - Monitor for feed tolerance      Diet order:  NPO with Tube Feed: Tube Feeding Modality: Gastrostomy  Jevity 1.2 Juventino  Total Volume for 24 Hours (mL): 1200  Bolus  Total Volume of Bolus (mL):  300  Tube Feed Frequency: Every 6 hours   Tube Feed Start Time: 15:00  Bolus Feed Rate (mL per Hour): 240   Bolus Feed Duration (in Hours): 0.5  Free Water Flush   Total Volume per Flush (mL): 100   Frequency: Every 6 Hours  Free Water Flush Instructions:  50 ml of free water flushes pre and post feeds  No Carb Prosource TF     Qty per Day:  1 (04-03-24 @ 11:29) [Active]    Anthropometrics:  Height (cm): 134 (04-02-24 @ 12:18)  Weight (kg): 52 (04-02-24 @ 12:18)  BMI (kg/m2): 29 (04-02-24 @ 12:18)  IBW: 29.5 kg     MEDICATIONS  (STANDING):  acetylcysteine 20%  Inhalation 4 milliLiter(s) Inhalation every 6 hours  albuterol    0.083% 2.5 milliGRAM(s) Nebulizer every 6 hours  albuterol    90 MICROgram(s) HFA Inhaler 1 Puff(s) Inhalation every 4 hours  albuterol/ipratropium for Nebulization 3 milliLiter(s) Nebulizer every 6 hours  apixaban 10 milliGRAM(s) Oral every 12 hours  gabapentin 300 milliGRAM(s) Oral every 12 hours  levETIRAcetam  Solution 500 milliGRAM(s) Oral two times a day  melatonin 3 milliGRAM(s) Oral at bedtime  midodrine. 20 milliGRAM(s) Oral three times a day  pantoprazole  Injectable 40 milliGRAM(s) IV Push every 24 hours  polyethylene glycol 3350 17 Gram(s) Oral at bedtime  raloxifene 60 milliGRAM(s) Oral daily  saccharomyces boulardii 250 milliGRAM(s) Oral two times a day  senna 2 Tablet(s) Oral at bedtime    MEDICATIONS  (PRN):  acetaminophen     Tablet .. 650 milliGRAM(s) Oral every 8 hours PRN Temp greater or equal to 38.5C (101.3F)  aluminum hydroxide/magnesium hydroxide/simethicone Suspension 30 milliLiter(s) Oral every 4 hours PRN Dyspepsia  ondansetron Injectable 4 milliGRAM(s) IV Push every 8 hours PRN Nausea and/or Vomiting    Pertinent Labs: 04-04 @ 07:45: Na 138, BUN 13, Cr 0.6<L>, <H>, K+ 4.2, Phos 3.2, Mg 2.3, Alk Phos 106, ALT/SGPT 23, AST/SGOT 23, HbA1c --    Physical Findings:  - Appearance: AAOx0; non verbal  - GI function: fecal incontinence   - Tubes: +PEG  - Oral/Mouth cavity: NPO per SLP  - Skin: R buttock blister, R heel unstageable, L lateral foot stage 2   - Edema: no edema noted     Nutrition Requirements  Weight Used: 52.3kg -Derived from nutrition note (2/6)      Estimated Energy Needs    Continue [x]  Adjust []  Adjusted Energy Recommendations: 1307-1493kcal/day (MSJ x 1.4-1.6)     Estimated Protein Needs    Continue [x]  Adjust []  Adjusted Protein Recommendations: 68-84gm/day (1.3-1.5 gm/kg)      Estimated Fluid Needs        Continue [x]  Adjust []  Adjusted Fluid Recommendations: 1308-1569mL/day (25-30mL/kg)     Nutrient Intake: EN regimen with Jevity 1.2 provides 1200 mL Total Volume, 1500 kcal, 81.6 gm Protein, 1432 mL free water     [x] Previous Nutrition Diagnosis:  Increased Nutrient Needs            [x] Ongoing          [] Resolved     Nutrition Education: deferred; not appropriate at this time      Goal/Expected Outcome: Patient will meet >/= 75% nutrient needs via Enteral Nutrition in 5-7 days      Indicator/Monitoring: RD to monitor EN intake/tolerance, GI function, Nutrition Labs, Electrolytes, NFPF, Weights    Recommendation: Continue EN regimen with Jevity 1.2 as able to goal of 300 mL q6H + No carb Prosource 1x daily.  Provides: 1200 mL total volume, 1500 kcal, 81.6 gm Protein, 972 mL free water from formula + recommend 50 mL pre/post feeds + 30 mL pre/post prosource administration = 1432 mL total water     Will continue to follow at moderate risk; f/u in 5-7 days.    Ama Dickson, MS, RDN  Spectra [phone] or via Teams

## 2024-04-04 NOTE — PROGRESS NOTE ADULT - ATTENDING COMMENTS
patient tolerating diet  no new complaints reported  Discharge planning patient tolerating diet  no new complaints reported  transition lovenox to oral anticoagulant  ct   dietitican consult reviewed:  Continue EN regimen with Jevity 1.2 as able to goal of 300 mL q6H + No carb Prosource 1x daily.  Discharge planning  D/w nursing and case management patient tolerating diet  no new complaints reported  DVT foun in october 23- s/p 5 months of treatment; repeat US negative for DVT; stop full dose anticoagulation; continue prophylaxis lovenox dosing  ct   dietitican consult reviewed:  Continue EN regimen with Jevity 1.2 as able to goal of 300 mL q6H + No carb Prosource 1x daily.  Discharge planning  D/w nursing and case management

## 2024-04-04 NOTE — CHART NOTE - NSCHARTNOTESELECT_GEN_ALL_CORE
Event Note
Letter for O2/Event Note
Letter of Medical Necessity
Nutrition Services
Nutrition/Event Note
Transfer Note
Transfer Note
Dietitian Follow-Up/Event Note
EGD/PEG/Event Note
Event Note
Gastroenterology/Event Note
Nutrition - RD Follow Up/Nutrition Services
Nutrition - RD Follow Up/Nutrition Services
Nutrition Services
Nutrition Services
PACU Admission/Event Note
PEG Deferral
[facility]/Event Note
Transfer Note
Transfer Note

## 2024-04-04 NOTE — CHART NOTE - NSCHARTNOTEFT_GEN_A_CORE
Letter of Medical Necessity:     Despite current treatment with bronchodilators, patient desaturates.      - Room air pulse ox. at rest:  86%       - Room air pulse ox while ambulating: Cannot be accessed due to Functional Quadriplegia    - Pulse ox on 2 liters n/c  O2 93-94%    Patient will require home O2 for discharge.

## 2024-04-04 NOTE — PROGRESS NOTE ADULT - ASSESSMENT
58F w/ PMHx Down Syndrome, nonverbal at baseline, Hypothyroidism, Cerebral palsy and Seizure Disorders presents to the ED from nursing facility/group home [facility]) presented to ED for respiratory distress and high grade fever. Patient found to be septic on admission. Admitted to SDU for management of acute respiratory distress 2/2 CAP/Aspiration PNA (20 Jan 2024 18:22)  While pt was on the floor she developed dyspnea; consulted by pulmonary/critical care and was upgraded back to SDU 3/14. During CEU, she was tapered off of HFNC and complete course of antibiotics. She was stable for downgrade on 3/17.     # Sepsis POA / Acute hypoxic resp failure / RSV bronchiolitis /  Suspected aspiration    - On 2 L NC  now ,no difficulty breathing  , Tried weaning her off the O2 , unsucessfull , 86% on RA   - continue midodrine 20 Q8H  - completed caspo (end 3/10) per ID   - c/w albuterol  - Completed  meropenem 1g q8h IV and  levaquin 750mg q24h; finished  on 3/20.     # H/o Dysphagia:   - Failed FEES, was on NG feeds  - gi consulted for PEG ,  emilio ([id number]) for consent - consent obtained>> Endoscopic PEG placment done ( 04/02)   - Started PEG feeding   - MOnitor for feed tolerance     #  Elevated Troponin , type 2 MI/  Sinus tachycardia  - Repeat EKG: Normal sinus rhythm  - Tachycardia resolved S/P Metoprolol.   - TTE 2/19 normal EF no DD or valve abnormalities    # Seizure Disorder  - c/w  Keppra   - seizure precautions  - keep Mg above 2.0     # H/o lower ext DVT   - Started on Lovenox     # Normocytic Anemia, anemia of chronic disease   - monitor H/H, keep Hb above 7.5     # Down Syndrome/  Cerebral Palsy/ functional quadriplegia   - supportive care  - prevent falls and aspiration     #MISC:   DVT : Lovenox   Diet : G tube Feeding   Activity: Ambulate as tolerated  GI : Pantoprazole      Pending :   Placement        58F w/ PMHx Down Syndrome, nonverbal at baseline, Hypothyroidism, Cerebral palsy and Seizure Disorders [facility] ED [facility] presented to ED for respiratory distress and high grade fever. Patient found to be septic on admission. Admitted to SDU for management of acute respiratory distress 2/2 CAP/Aspiration PNA (20 Jan 2024 18:22)  While pt was on the floor she developed dyspnea; consulted by pulmonary/critical care and was upgraded back to SDU 3/14. During CEU, she was tapered off of HFNC and complete course of antibiotics. She was stable for downgrade on 3/17.     # Sepsis POA / Acute hypoxic resp failure / RSV bronchiolitis /  Suspected aspiration    - On 2 L NC  now ,no difficulty breathing  , Tried weaning her off the O2 , unsuccessfull , 86% on RA   - continue midodrine 20 Q8H  - completed caspo (end 3/10) per ID   - c/w albuterol  - Completed  meropenem 1g q8h IV and  levaquin 750mg q24h; finished  on 3/20.     # H/o Dysphagia:   - Failed FEES, was on NG feeds  - gi consulted for PEG ,  [place] ([id number]) for consent - consent obtained>> Endoscopic PEG placment done ( 04/02)   - Started PEG feeding   - MOnitor for feed tolerance   - No concern for Refeeding Syndrome     #  Elevated Troponin , type 2 MI/  Sinus tachycardia  - Repeat EKG: Normal sinus rhythm  - Tachycardia resolved S/P Metoprolol.   - TTE 2/19 normal EF no DD or valve abnormalities    # Seizure Disorder  - c/w  Keppra   - seizure precautions  - keep Mg above 2.0     # H/o lower ext DVT   -Changed to Eliquis , Loading Dose for 7 days ( Completes on 04/11)     # Normocytic Anemia, anemia of chronic disease   - monitor H/H, keep Hb above 7.5     # Down Syndrome/  Cerebral Palsy/ functional quadriplegia   - supportive care  - prevent falls and aspiration     Dietary Recommendation:   Continue EN regimen with Jevity 1.2 as able to goal of 300 mL q6H + No carb Prosource 1x daily.  Provides: 1200 mL total volume, 1500 kcal, 81.6 gm Protein, 972 mL free water from formula + recommend 50 mL pre/post feeds + 30 mL pre/post prosource administration = 1432 mL total water     Will continue to follow at moderate risk; f/u in 5-7 days.    #MISC:   DVT : Changed to Eliquis   Diet : G tube Feeding   Activity: Ambulate as tolerated  GI : Pantoprazole      Pending :   Placement

## 2024-04-05 LAB
ALBUMIN SERPL ELPH-MCNC: 3.4 G/DL — LOW (ref 3.5–5.2)
ALP SERPL-CCNC: 105 U/L — SIGNIFICANT CHANGE UP (ref 30–115)
ALT FLD-CCNC: 21 U/L — SIGNIFICANT CHANGE UP (ref 0–41)
ANION GAP SERPL CALC-SCNC: 12 MMOL/L — SIGNIFICANT CHANGE UP (ref 7–14)
AST SERPL-CCNC: 18 U/L — SIGNIFICANT CHANGE UP (ref 0–41)
BASOPHILS # BLD AUTO: 0.03 K/UL — SIGNIFICANT CHANGE UP (ref 0–0.2)
BASOPHILS NFR BLD AUTO: 0.3 % — SIGNIFICANT CHANGE UP (ref 0–1)
BILIRUB SERPL-MCNC: 0.4 MG/DL — SIGNIFICANT CHANGE UP (ref 0.2–1.2)
BUN SERPL-MCNC: 12 MG/DL — SIGNIFICANT CHANGE UP (ref 10–20)
CALCIUM SERPL-MCNC: 9 MG/DL — SIGNIFICANT CHANGE UP (ref 8.4–10.4)
CHLORIDE SERPL-SCNC: 102 MMOL/L — SIGNIFICANT CHANGE UP (ref 98–110)
CO2 SERPL-SCNC: 24 MMOL/L — SIGNIFICANT CHANGE UP (ref 17–32)
CREAT SERPL-MCNC: 0.6 MG/DL — LOW (ref 0.7–1.5)
EGFR: 105 ML/MIN/1.73M2 — SIGNIFICANT CHANGE UP
EOSINOPHIL # BLD AUTO: 0.07 K/UL — SIGNIFICANT CHANGE UP (ref 0–0.7)
EOSINOPHIL NFR BLD AUTO: 0.6 % — SIGNIFICANT CHANGE UP (ref 0–8)
GLUCOSE SERPL-MCNC: 141 MG/DL — HIGH (ref 70–99)
HCT VFR BLD CALC: 31.3 % — LOW (ref 37–47)
HGB BLD-MCNC: 9.6 G/DL — LOW (ref 12–16)
IMM GRANULOCYTES NFR BLD AUTO: 0.3 % — SIGNIFICANT CHANGE UP (ref 0.1–0.3)
LYMPHOCYTES # BLD AUTO: 1.39 K/UL — SIGNIFICANT CHANGE UP (ref 1.2–3.4)
LYMPHOCYTES # BLD AUTO: 12.6 % — LOW (ref 20.5–51.1)
MCHC RBC-ENTMCNC: 26.6 PG — LOW (ref 27–31)
MCHC RBC-ENTMCNC: 30.7 G/DL — LOW (ref 32–37)
MCV RBC AUTO: 86.7 FL — SIGNIFICANT CHANGE UP (ref 81–99)
MONOCYTES # BLD AUTO: 0.27 K/UL — SIGNIFICANT CHANGE UP (ref 0.1–0.6)
MONOCYTES NFR BLD AUTO: 2.5 % — SIGNIFICANT CHANGE UP (ref 1.7–9.3)
NEUTROPHILS # BLD AUTO: 9.23 K/UL — HIGH (ref 1.4–6.5)
NEUTROPHILS NFR BLD AUTO: 83.7 % — HIGH (ref 42.2–75.2)
NRBC # BLD: 0 /100 WBCS — SIGNIFICANT CHANGE UP (ref 0–0)
PLATELET # BLD AUTO: 341 K/UL — SIGNIFICANT CHANGE UP (ref 130–400)
PMV BLD: 10.3 FL — SIGNIFICANT CHANGE UP (ref 7.4–10.4)
POTASSIUM SERPL-MCNC: 4.6 MMOL/L — SIGNIFICANT CHANGE UP (ref 3.5–5)
POTASSIUM SERPL-SCNC: 4.6 MMOL/L — SIGNIFICANT CHANGE UP (ref 3.5–5)
PROT SERPL-MCNC: 6.8 G/DL — SIGNIFICANT CHANGE UP (ref 6–8)
RBC # BLD: 3.61 M/UL — LOW (ref 4.2–5.4)
RBC # FLD: 25.1 % — HIGH (ref 11.5–14.5)
SODIUM SERPL-SCNC: 138 MMOL/L — SIGNIFICANT CHANGE UP (ref 135–146)
WBC # BLD: 11.02 K/UL — HIGH (ref 4.8–10.8)
WBC # FLD AUTO: 11.02 K/UL — HIGH (ref 4.8–10.8)

## 2024-04-05 PROCEDURE — 99231 SBSQ HOSP IP/OBS SF/LOW 25: CPT

## 2024-04-05 RX ADMIN — Medication 1 APPLICATION(S): at 06:21

## 2024-04-05 RX ADMIN — ALBUTEROL 2.5 MILLIGRAM(S): 90 AEROSOL, METERED ORAL at 08:13

## 2024-04-05 RX ADMIN — LEVETIRACETAM 500 MILLIGRAM(S): 250 TABLET, FILM COATED ORAL at 18:28

## 2024-04-05 RX ADMIN — ENOXAPARIN SODIUM 40 MILLIGRAM(S): 100 INJECTION SUBCUTANEOUS at 11:55

## 2024-04-05 RX ADMIN — CHLORHEXIDINE GLUCONATE 1 APPLICATION(S): 213 SOLUTION TOPICAL at 11:56

## 2024-04-05 RX ADMIN — MIDODRINE HYDROCHLORIDE 20 MILLIGRAM(S): 2.5 TABLET ORAL at 06:19

## 2024-04-05 RX ADMIN — ALBUTEROL 2.5 MILLIGRAM(S): 90 AEROSOL, METERED ORAL at 19:47

## 2024-04-05 RX ADMIN — Medication 3 MILLIGRAM(S): at 21:48

## 2024-04-05 RX ADMIN — ALBUTEROL 2.5 MILLIGRAM(S): 90 AEROSOL, METERED ORAL at 14:12

## 2024-04-05 RX ADMIN — Medication 1 APPLICATION(S): at 18:28

## 2024-04-05 RX ADMIN — SENNA PLUS 2 TABLET(S): 8.6 TABLET ORAL at 21:48

## 2024-04-05 RX ADMIN — LEVETIRACETAM 500 MILLIGRAM(S): 250 TABLET, FILM COATED ORAL at 06:19

## 2024-04-05 RX ADMIN — Medication 4 MILLILITER(S): at 08:13

## 2024-04-05 RX ADMIN — Medication 2 SPRAY(S): at 21:48

## 2024-04-05 RX ADMIN — Medication 1 DROP(S): at 06:20

## 2024-04-05 RX ADMIN — Medication 4 MILLILITER(S): at 19:47

## 2024-04-05 RX ADMIN — Medication 250 MILLIGRAM(S): at 18:29

## 2024-04-05 RX ADMIN — Medication 2 SPRAY(S): at 11:55

## 2024-04-05 RX ADMIN — POLYETHYLENE GLYCOL 3350 17 GRAM(S): 17 POWDER, FOR SOLUTION ORAL at 21:49

## 2024-04-05 RX ADMIN — GABAPENTIN 300 MILLIGRAM(S): 400 CAPSULE ORAL at 18:27

## 2024-04-05 RX ADMIN — PANTOPRAZOLE SODIUM 40 MILLIGRAM(S): 20 TABLET, DELAYED RELEASE ORAL at 06:19

## 2024-04-05 RX ADMIN — GABAPENTIN 300 MILLIGRAM(S): 400 CAPSULE ORAL at 06:24

## 2024-04-05 RX ADMIN — Medication 1 APPLICATION(S): at 11:54

## 2024-04-05 RX ADMIN — Medication 2 SPRAY(S): at 06:21

## 2024-04-05 RX ADMIN — RALOXIFENE HYDROCHLORIDE 60 MILLIGRAM(S): 60 TABLET, COATED ORAL at 11:54

## 2024-04-05 RX ADMIN — Medication 4 MILLILITER(S): at 14:13

## 2024-04-05 RX ADMIN — Medication 250 MILLIGRAM(S): at 06:21

## 2024-04-05 NOTE — PROGRESS NOTE ADULT - ATTENDING COMMENTS
patient tolerating diet  no new complaints reported  DVT found in october 23- s/p 5 months of treatment; repeat US negative for DVT; stop full dose anticoagulation; continue prophylaxis lovenox dosing  ct   dietitican consult reviewed:  Continue EN regimen with Jevity 1.2 as able to goal of 300 mL q6H + No carb Prosource 1x daily.  Discharge planning  D/w nursing and case management.

## 2024-04-05 NOTE — PROGRESS NOTE ADULT - SUBJECTIVE AND OBJECTIVE BOX
24H events:    Patient is a 58y old Female who presents with a chief complaint of Respiratory Distress (04 Apr 2024 12:37)    Primary diagnosis of Sepsis with acute hypoxic respiratory failure    Today is hospital day 76d. This morning patient was seen and examined at bedside, resting comfortably in bed.    No acute or major events overnight.      PAST MEDICAL & SURGICAL HISTORY  Down syndrome    Osteoporosis    Mild anemia    Neuropathy    S/P debridement  of R hip on 3/2/21      SOCIAL HISTORY:  Social History:      ALLERGIES:  No Known Allergies    MEDICATIONS:  STANDING MEDICATIONS  acetylcysteine 20%  Inhalation 4 milliLiter(s) Inhalation every 6 hours  albuterol    0.083% 2.5 milliGRAM(s) Nebulizer every 6 hours  albuterol    90 MICROgram(s) HFA Inhaler 1 Puff(s) Inhalation every 4 hours  albuterol/ipratropium for Nebulization 3 milliLiter(s) Nebulizer every 6 hours  AQUAPHOR (petrolatum Ointment) 1 Application(s) Topical two times a day  artificial  tears Solution 1 Drop(s) Both EYES two times a day  chlorhexidine 2% Cloths 1 Application(s) Topical daily  enoxaparin Injectable 40 milliGRAM(s) SubCutaneous every 24 hours  erythromycin   Ointment 1 Application(s) Both EYES daily  gabapentin 300 milliGRAM(s) Oral every 12 hours  levETIRAcetam  Solution 500 milliGRAM(s) Oral two times a day  melatonin 3 milliGRAM(s) Oral at bedtime  midodrine. 20 milliGRAM(s) Oral three times a day  pantoprazole  Injectable 40 milliGRAM(s) IV Push every 24 hours  polyethylene glycol 3350 17 Gram(s) Oral at bedtime  raloxifene 60 milliGRAM(s) Oral daily  saccharomyces boulardii 250 milliGRAM(s) Oral two times a day  senna 2 Tablet(s) Oral at bedtime  sodium chloride 0.65% Nasal 2 Spray(s) Both Nostrils three times a day    PRN MEDICATIONS  acetaminophen     Tablet .. 650 milliGRAM(s) Oral every 8 hours PRN  aluminum hydroxide/magnesium hydroxide/simethicone Suspension 30 milliLiter(s) Oral every 4 hours PRN  ondansetron Injectable 4 milliGRAM(s) IV Push every 8 hours PRN    VITALS:   T(F): 98.2  HR: 92  BP: 126/50  RR: 18  SpO2: 96%    PHYSICAL EXAM:  GENERAL:   ( x ) NAD, lying in bed comfortably     (  ) obtunded     (  ) lethargic     (  ) somnolent    HEAD:   (  ) Atraumatic     (  ) hematoma     (  ) laceration (specify location:       )     NECK:  (  ) Supple     (  ) neck stiffness     (  ) nuchal rigidity     (  )  no JVD     (  ) JVD present ( -- cm)    HEART:  Rate -->     ( x ) normal rate     (  ) bradycardic     (  ) tachycardic  Rhythm -->     (  ) regular     (  ) regularly irregular     (  ) irregularly irregular  Murmurs -->     (  ) normal s1s2     ( x ) systolic murmur     (  ) diastolic murmur     (  ) continuous murmur      (  ) S3 present     (  ) S4 present    LUNGS:   ( x )Unlabored respirations     (  ) tachypnea  ( x ) B/L air entry     (  ) decreased breath sounds in:  (location     )    ( x ) no adventitious sound     (  ) crackles     (  ) wheezing      (  ) rhonchi      (specify location:       )  (  ) chest wall tenderness (specify location:       )    ABDOMEN:   ( x ) Soft     (  ) tense   |   ( x ) nondistended     (  ) distended   |   (  ) +BS     (  ) hypoactive bowel sounds     (  ) hyperactive bowel sounds  ( x ) nontender     (  ) RUQ tenderness     (  ) RLQ tenderness     (  ) LLQ tenderness     (  ) epigastric tenderness     (  ) diffuse tenderness  (  ) Splenomegaly      (  ) Hepatomegaly      (  ) Jaundice     (  ) ecchymosis     EXTREMITIES:  ( x ) Normal     (  ) Rash     (  ) ecchymosis     (  ) varicose veins      (  ) pitting edema     (  ) non-pitting edema   (  ) ulceration     (  ) gangrene:     (location:     )    NERVOUS SYSTEM:    ( x ) A&Ox0     (  ) confused     (  ) lethargic  CN II-XII:     (  ) Intact     (  ) deficits found     (Specify:     )   Upper extremities:     (  ) no sensorimotor deficits     (  ) weakness     (  ) loss of proprioception/vibration     (  ) loss of touch/temperature (specify:    )  Lower extremities:     (  ) no sensorimotor deficits     (  ) weakness     (  ) loss of proprioception/vibration     (  ) loss of touch/temperature (specify:    )    SKIN:   (  ) No rashes or lesions     (  ) maculopapular rash     (  ) pustules     (  ) vesicles     (  ) ulcer     (  ) ecchymosis     (specify location:     )    LABS:                        9.6    11.02 )-----------( 341      ( 05 Apr 2024 06:33 )             31.3     04-05    138  |  102  |  12  ----------------------------<  141<H>  4.6   |  24  |  0.6<L>    Ca    9.0      05 Apr 2024 06:33  Phos  3.2     04-04  Mg     2.3     04-04    TPro  6.8  /  Alb  3.4<L>  /  TBili  0.4  /  DBili  x   /  AST  18  /  ALT  21  /  AlkPhos  105  04-05      Urinalysis Basic - ( 05 Apr 2024 06:33 )    Color: x / Appearance: x / SG: x / pH: x  Gluc: 141 mg/dL / Ketone: x  / Bili: x / Urobili: x   Blood: x / Protein: x / Nitrite: x   Leuk Esterase: x / RBC: x / WBC x   Sq Epi: x / Non Sq Epi: x / Bacteria: x

## 2024-04-05 NOTE — PROGRESS NOTE ADULT - ASSESSMENT
58F w/ PMHx Down Syndrome, nonverbal at baseline, Hypothyroidism, Cerebral palsy and Seizure Disorders presents [facility] the ED [facility] presented to ED for respiratory distress and high grade fever. Patient found to be septic on admission. Admitted to SDU for management of acute respiratory distress 2/2 CAP/Aspiration PNA (20 Jan 2024 18:22)  While pt was on the floor she developed dyspnea; consulted by pulmonary/critical care and was upgraded back to SDU 3/14. During CEU, she was tapered off of HFNC and complete course of antibiotics. She was stable for downgrade on 3/17.     # Sepsis POA / Acute hypoxic resp failure / RSV bronchiolitis /  Suspected aspiration    - On 2 L NC  now ,no difficulty breathing  , Tried weaning her off the O2 , unsuccessfull , 86% on RA   - continue midodrine 20 Q8H  - completed caspo (end 3/10) per ID   - c/w albuterol  - Completed  meropenem 1g q8h IV and  levaquin 750mg q24h; finished  on 3/20.     # H/o Dysphagia:   - Failed FEES, was on NG feeds  - gi consulted for PEG ,  [place] ([id number]) for consent - consent obtained>> Endoscopic PEG placment done ( 04/02)   - Started PEG feeding   - MOnitor for feed tolerance   - No concern for Refeeding Syndrome     #  Elevated Troponin , type 2 MI/  Sinus tachycardia  - Repeat EKG: Normal sinus rhythm  - Tachycardia resolved S/P Metoprolol.   - TTE 2/19 normal EF no DD or valve abnormalities    # Seizure Disorder  - c/w  Keppra   - seizure precautions  - keep Mg above 2.0     # H/o lower ext DVT   -Changed to Eliquis , Loading Dose for 7 days ( Completes on 04/11)     # Normocytic Anemia, anemia of chronic disease   - monitor H/H, keep Hb above 7.5     # Down Syndrome/  Cerebral Palsy/ functional quadriplegia   - supportive care  - prevent falls and aspiration     Dietary Recommendation:   Continue EN regimen with Jevity 1.2 as able to goal of 300 mL q6H + No carb Prosource 1x daily.  Provides: 1200 mL total volume, 1500 kcal, 81.6 gm Protein, 972 mL free water from formula + recommend 50 mL pre/post feeds + 30 mL pre/post prosource administration = 1432 mL total water     Will continue to follow at moderate risk; f/u in 5-7 days.    #MISC:   DVT : Changed to Eliquis   Diet : G tube Feeding   Activity: Ambulate as tolerated  GI : Pantoprazole      Pending :   Placement    58F w/ PMHx Down Syndrome, nonverbal at baseline, Hypothyroidism, Cerebral palsy and Seizure Disorders [facility] ED [facility] presented to ED for respiratory distress and high grade fever. Patient found to be septic on admission. Admitted to SDU for management of acute respiratory distress 2/2 CAP/Aspiration PNA (20 Jan 2024 18:22)  While pt was on the floor she developed dyspnea; consulted by pulmonary/critical care and was upgraded back to SDU 3/14. During CEU, she was tapered off of HFNC and complete course of antibiotics. She was stable for downgrade on 3/17.     # Sepsis POA / Acute hypoxic resp failure / RSV bronchiolitis /  Suspected aspiration    - On 2 L NC  now ,no difficulty breathing  , Tried weaning her off the O2 , unsuccessfull , 86% on RA   - continue midodrine 20 Q8H  - completed caspo (end 3/10) per ID   - c/w albuterol  - Completed  meropenem 1g q8h IV and  levaquin 750mg q24h; finished  on 3/20.     # H/o Dysphagia:   - Failed FEES, was on NG feeds  - gi consulted for PEG ,  [place] ([id number]) for consent - consent obtained>> Endoscopic PEG placment done ( 04/02)   - Started PEG feeding   - MOnitor for feed tolerance   - No concern for Refeeding Syndrome     #  Elevated Troponin , type 2 MI/  Sinus tachycardia  - Repeat EKG: Normal sinus rhythm  - Tachycardia resolved S/P Metoprolol.   - TTE 2/19 normal EF no DD or valve abnormalities    # Seizure Disorder  - c/w  Keppra   - seizure precautions  - keep Mg above 2.0     # H/o lower ext DVT   completed treatement    # Normocytic Anemia, anemia of chronic disease   - monitor H/H, keep Hb above 7.5     # Down Syndrome/  Cerebral Palsy/ functional quadriplegia   - supportive care  - prevent falls and aspiration     Dietary Recommendation:   Continue EN regimen with Jevity 1.2 as able to goal of 300 mL q6H + No carb Prosource 1x daily.  Provides: 1200 mL total volume, 1500 kcal, 81.6 gm Protein, 972 mL free water from formula + recommend 50 mL pre/post feeds + 30 mL pre/post prosource administration = 1432 mL total water     Will continue to follow at moderate risk; f/u in 5-7 days.    #MISC:   DVT : Changed to Eliquis   Diet : G tube Feeding   Activity: Ambulate as tolerated  GI : Pantoprazole      Pending :   Placement

## 2024-04-06 LAB
ALBUMIN SERPL ELPH-MCNC: 3.3 G/DL — LOW (ref 3.5–5.2)
ALP SERPL-CCNC: 107 U/L — SIGNIFICANT CHANGE UP (ref 30–115)
ALT FLD-CCNC: 21 U/L — SIGNIFICANT CHANGE UP (ref 0–41)
ANION GAP SERPL CALC-SCNC: 13 MMOL/L — SIGNIFICANT CHANGE UP (ref 7–14)
AST SERPL-CCNC: 20 U/L — SIGNIFICANT CHANGE UP (ref 0–41)
BASOPHILS # BLD AUTO: 0.04 K/UL — SIGNIFICANT CHANGE UP (ref 0–0.2)
BASOPHILS NFR BLD AUTO: 0.4 % — SIGNIFICANT CHANGE UP (ref 0–1)
BILIRUB SERPL-MCNC: 0.3 MG/DL — SIGNIFICANT CHANGE UP (ref 0.2–1.2)
BUN SERPL-MCNC: 16 MG/DL — SIGNIFICANT CHANGE UP (ref 10–20)
CALCIUM SERPL-MCNC: 9.1 MG/DL — SIGNIFICANT CHANGE UP (ref 8.4–10.5)
CHLORIDE SERPL-SCNC: 100 MMOL/L — SIGNIFICANT CHANGE UP (ref 98–110)
CO2 SERPL-SCNC: 26 MMOL/L — SIGNIFICANT CHANGE UP (ref 17–32)
CREAT SERPL-MCNC: 0.5 MG/DL — LOW (ref 0.7–1.5)
EGFR: 109 ML/MIN/1.73M2 — SIGNIFICANT CHANGE UP
EOSINOPHIL # BLD AUTO: 0.2 K/UL — SIGNIFICANT CHANGE UP (ref 0–0.7)
EOSINOPHIL NFR BLD AUTO: 1.8 % — SIGNIFICANT CHANGE UP (ref 0–8)
GLUCOSE SERPL-MCNC: 136 MG/DL — HIGH (ref 70–99)
HCT VFR BLD CALC: 33.4 % — LOW (ref 37–47)
HGB BLD-MCNC: 9.8 G/DL — LOW (ref 12–16)
IMM GRANULOCYTES NFR BLD AUTO: 0.4 % — HIGH (ref 0.1–0.3)
LYMPHOCYTES # BLD AUTO: 1.7 K/UL — SIGNIFICANT CHANGE UP (ref 1.2–3.4)
LYMPHOCYTES # BLD AUTO: 15 % — LOW (ref 20.5–51.1)
MCHC RBC-ENTMCNC: 26.3 PG — LOW (ref 27–31)
MCHC RBC-ENTMCNC: 29.3 G/DL — LOW (ref 32–37)
MCV RBC AUTO: 89.5 FL — SIGNIFICANT CHANGE UP (ref 81–99)
MONOCYTES # BLD AUTO: 0.43 K/UL — SIGNIFICANT CHANGE UP (ref 0.1–0.6)
MONOCYTES NFR BLD AUTO: 3.8 % — SIGNIFICANT CHANGE UP (ref 1.7–9.3)
NEUTROPHILS # BLD AUTO: 8.94 K/UL — HIGH (ref 1.4–6.5)
NEUTROPHILS NFR BLD AUTO: 78.6 % — HIGH (ref 42.2–75.2)
NRBC # BLD: 0 /100 WBCS — SIGNIFICANT CHANGE UP (ref 0–0)
PLATELET # BLD AUTO: 363 K/UL — SIGNIFICANT CHANGE UP (ref 130–400)
PMV BLD: 10.6 FL — HIGH (ref 7.4–10.4)
POTASSIUM SERPL-MCNC: 4.7 MMOL/L — SIGNIFICANT CHANGE UP (ref 3.5–5)
POTASSIUM SERPL-SCNC: 4.7 MMOL/L — SIGNIFICANT CHANGE UP (ref 3.5–5)
PROT SERPL-MCNC: 6.8 G/DL — SIGNIFICANT CHANGE UP (ref 6–8)
RBC # BLD: 3.73 M/UL — LOW (ref 4.2–5.4)
RBC # FLD: 25.2 % — HIGH (ref 11.5–14.5)
SODIUM SERPL-SCNC: 139 MMOL/L — SIGNIFICANT CHANGE UP (ref 135–146)
WBC # BLD: 11.36 K/UL — HIGH (ref 4.8–10.8)
WBC # FLD AUTO: 11.36 K/UL — HIGH (ref 4.8–10.8)

## 2024-04-06 PROCEDURE — 99231 SBSQ HOSP IP/OBS SF/LOW 25: CPT

## 2024-04-06 RX ADMIN — PANTOPRAZOLE SODIUM 40 MILLIGRAM(S): 20 TABLET, DELAYED RELEASE ORAL at 05:20

## 2024-04-06 RX ADMIN — LEVETIRACETAM 500 MILLIGRAM(S): 250 TABLET, FILM COATED ORAL at 05:17

## 2024-04-06 RX ADMIN — ALBUTEROL 2.5 MILLIGRAM(S): 90 AEROSOL, METERED ORAL at 20:15

## 2024-04-06 RX ADMIN — Medication 250 MILLIGRAM(S): at 18:33

## 2024-04-06 RX ADMIN — Medication 2 SPRAY(S): at 13:58

## 2024-04-06 RX ADMIN — Medication 1 APPLICATION(S): at 05:18

## 2024-04-06 RX ADMIN — Medication 3 MILLILITER(S): at 09:09

## 2024-04-06 RX ADMIN — Medication 3 MILLIGRAM(S): at 21:17

## 2024-04-06 RX ADMIN — Medication 1 APPLICATION(S): at 17:46

## 2024-04-06 RX ADMIN — Medication 4 MILLILITER(S): at 09:09

## 2024-04-06 RX ADMIN — MIDODRINE HYDROCHLORIDE 20 MILLIGRAM(S): 2.5 TABLET ORAL at 11:48

## 2024-04-06 RX ADMIN — Medication 250 MILLIGRAM(S): at 05:20

## 2024-04-06 RX ADMIN — SENNA PLUS 2 TABLET(S): 8.6 TABLET ORAL at 21:17

## 2024-04-06 RX ADMIN — ALBUTEROL 2.5 MILLIGRAM(S): 90 AEROSOL, METERED ORAL at 14:35

## 2024-04-06 RX ADMIN — GABAPENTIN 300 MILLIGRAM(S): 400 CAPSULE ORAL at 05:17

## 2024-04-06 RX ADMIN — GABAPENTIN 300 MILLIGRAM(S): 400 CAPSULE ORAL at 17:45

## 2024-04-06 RX ADMIN — ENOXAPARIN SODIUM 40 MILLIGRAM(S): 100 INJECTION SUBCUTANEOUS at 11:48

## 2024-04-06 RX ADMIN — MIDODRINE HYDROCHLORIDE 20 MILLIGRAM(S): 2.5 TABLET ORAL at 05:18

## 2024-04-06 RX ADMIN — Medication 4 MILLILITER(S): at 20:15

## 2024-04-06 RX ADMIN — POLYETHYLENE GLYCOL 3350 17 GRAM(S): 17 POWDER, FOR SOLUTION ORAL at 21:18

## 2024-04-06 RX ADMIN — Medication 4 MILLILITER(S): at 14:35

## 2024-04-06 RX ADMIN — Medication 1 DROP(S): at 18:57

## 2024-04-06 RX ADMIN — LEVETIRACETAM 500 MILLIGRAM(S): 250 TABLET, FILM COATED ORAL at 17:44

## 2024-04-06 RX ADMIN — Medication 2 SPRAY(S): at 05:18

## 2024-04-06 RX ADMIN — Medication 1 DROP(S): at 05:19

## 2024-04-06 RX ADMIN — Medication 2 SPRAY(S): at 21:18

## 2024-04-06 RX ADMIN — CHLORHEXIDINE GLUCONATE 1 APPLICATION(S): 213 SOLUTION TOPICAL at 11:49

## 2024-04-06 RX ADMIN — MIDODRINE HYDROCHLORIDE 20 MILLIGRAM(S): 2.5 TABLET ORAL at 17:45

## 2024-04-06 RX ADMIN — Medication 1 APPLICATION(S): at 11:48

## 2024-04-06 RX ADMIN — RALOXIFENE HYDROCHLORIDE 60 MILLIGRAM(S): 60 TABLET, COATED ORAL at 11:48

## 2024-04-06 NOTE — PROGRESS NOTE ADULT - SUBJECTIVE AND OBJECTIVE BOX
24H events:    Patient is a 58y old Female who presents with a chief complaint of Respiratory Distress (05 Apr 2024 11:44)    Primary diagnosis of Sepsis with acute hypoxic respiratory failure    Today is hospital day 77d. This morning patient was seen and examined at bedside, resting comfortably in bed.    No acute or major events overnight.        PAST MEDICAL & SURGICAL HISTORY  Down syndrome    Osteoporosis    Mild anemia    Neuropathy    S/P debridement  of R hip on 3/2/21      SOCIAL HISTORY:  Social History:      ALLERGIES:  No Known Allergies    MEDICATIONS:  STANDING MEDICATIONS  acetylcysteine 20%  Inhalation 4 milliLiter(s) Inhalation every 6 hours  albuterol    0.083% 2.5 milliGRAM(s) Nebulizer every 6 hours  albuterol    90 MICROgram(s) HFA Inhaler 1 Puff(s) Inhalation every 4 hours  albuterol/ipratropium for Nebulization 3 milliLiter(s) Nebulizer every 6 hours  AQUAPHOR (petrolatum Ointment) 1 Application(s) Topical two times a day  artificial  tears Solution 1 Drop(s) Both EYES two times a day  chlorhexidine 2% Cloths 1 Application(s) Topical daily  enoxaparin Injectable 40 milliGRAM(s) SubCutaneous every 24 hours  erythromycin   Ointment 1 Application(s) Both EYES daily  gabapentin 300 milliGRAM(s) Oral every 12 hours  levETIRAcetam  Solution 500 milliGRAM(s) Oral two times a day  melatonin 3 milliGRAM(s) Oral at bedtime  midodrine. 20 milliGRAM(s) Oral three times a day  pantoprazole  Injectable 40 milliGRAM(s) IV Push every 24 hours  polyethylene glycol 3350 17 Gram(s) Oral at bedtime  raloxifene 60 milliGRAM(s) Oral daily  saccharomyces boulardii 250 milliGRAM(s) Oral two times a day  senna 2 Tablet(s) Oral at bedtime  sodium chloride 0.65% Nasal 2 Spray(s) Both Nostrils three times a day    PRN MEDICATIONS  acetaminophen     Tablet .. 650 milliGRAM(s) Oral every 8 hours PRN  aluminum hydroxide/magnesium hydroxide/simethicone Suspension 30 milliLiter(s) Oral every 4 hours PRN  ondansetron Injectable 4 milliGRAM(s) IV Push every 8 hours PRN    VITALS:   T(F): 96  HR: 72  BP: 101/55  RR: 18  SpO2: 95%    PHYSICAL EXAM:    GENERAL:   ( x ) NAD, lying in bed comfortably     (  ) obtunded     (  ) lethargic     (  ) somnolent    HEAD:   (  ) Atraumatic     (  ) hematoma     (  ) laceration (specify location:       )     NECK:  (  ) Supple     (  ) neck stiffness     (  ) nuchal rigidity     (  )  no JVD     (  ) JVD present ( -- cm)    HEART:  Rate -->     ( x ) normal rate     (  ) bradycardic     (  ) tachycardic  Rhythm -->     (  ) regular     (  ) regularly irregular     (  ) irregularly irregular  Murmurs -->     (  ) normal s1s2     ( x ) systolic murmur     (  ) diastolic murmur     (  ) continuous murmur      (  ) S3 present     (  ) S4 present    LUNGS:   ( x )Unlabored respirations     (  ) tachypnea  ( x ) B/L air entry     (  ) decreased breath sounds in:  (location     )    ( x ) no adventitious sound     (  ) crackles     (  ) wheezing      (  ) rhonchi      (specify location:       )  (  ) chest wall tenderness (specify location:       )    ABDOMEN:   ( x ) Soft     (  ) tense   |   ( x ) nondistended     (  ) distended   |   (  ) +BS     (  ) hypoactive bowel sounds     (  ) hyperactive bowel sounds  ( x ) nontender     (  ) RUQ tenderness     (  ) RLQ tenderness     (  ) LLQ tenderness     (  ) epigastric tenderness     (  ) diffuse tenderness  (  ) Splenomegaly      (  ) Hepatomegaly      (  ) Jaundice     (  ) ecchymosis     EXTREMITIES:  ( x ) Normal     (  ) Rash     (  ) ecchymosis     (  ) varicose veins      (  ) pitting edema     (  ) non-pitting edema   (  ) ulceration     (  ) gangrene:     (location:     )    NERVOUS SYSTEM:    ( x ) A&Ox0     (  ) confused     (  ) lethargic  CN II-XII:     (  ) Intact     (  ) deficits found     (Specify:     )   Upper extremities:     (  ) no sensorimotor deficits     (  ) weakness     (  ) loss of proprioception/vibration     (  ) loss of touch/temperature (specify:    )  Lower extremities:     (  ) no sensorimotor deficits     (  ) weakness     (  ) loss of proprioception/vibration     (  ) loss of touch/temperature (specify:    )    SKIN:   (  ) No rashes or lesions     (  ) maculopapular rash     (  ) pustules     (  ) vesicles     (  ) ulcer     (  ) ecchymosis     (specify location:     )    LABS:                        9.8    11.36 )-----------( 363      ( 06 Apr 2024 06:59 )             33.4     04-06    139  |  100  |  16  ----------------------------<  136<H>  4.7   |  26  |  0.5<L>    Ca    9.1      06 Apr 2024 06:59    TPro  6.8  /  Alb  3.3<L>  /  TBili  0.3  /  DBili  x   /  AST  20  /  ALT  21  /  AlkPhos  107  04-06      Urinalysis Basic - ( 06 Apr 2024 06:59 )    Color: x / Appearance: x / SG: x / pH: x  Gluc: 136 mg/dL / Ketone: x  / Bili: x / Urobili: x   Blood: x / Protein: x / Nitrite: x   Leuk Esterase: x / RBC: x / WBC x   Sq Epi: x / Non Sq Epi: x / Bacteria: x

## 2024-04-06 NOTE — PROGRESS NOTE ADULT - ATTENDING SUPERVISION STATEMENT
Resident
Fellow
Resident
Resident
Fellow
Resident
Fellow
Resident
Fellow
Resident
Resident
Fellow
Resident
Fellow
Fellow

## 2024-04-06 NOTE — PROGRESS NOTE ADULT - ATTENDING COMMENTS
Patient seen and examined. Case discussed with resident physician, nursing staff and case management.     discharge plan on Monday  continue current care

## 2024-04-06 NOTE — PROGRESS NOTE ADULT - ASSESSMENT
58F w/ PMHx Down Syndrome, nonverbal at baseline, Hypothyroidism, Cerebral palsy and Seizure Disorders [facility] presented to ED for respiratory distress and high grade fever. Patient found to be septic on admission. Admitted to SDU for management of acute respiratory distress 2/2 CAP/Aspiration PNA (20 Jan 2024 18:22)  While pt was on the floor she developed dyspnea; consulted by pulmonary/critical care and was upgraded back to SDU 3/14. During CEU, she was tapered off of HFNC and complete course of antibiotics. She was stable for downgrade on 3/17.     # Sepsis POA / Acute hypoxic resp failure / RSV bronchiolitis /  Suspected aspiration    - On 2 L NC  now ,no difficulty breathing  , Tried weaning her off the O2 , unsuccessfull , 86% on RA   - continue midodrine 20 Q8H  - completed caspo (end 3/10) per ID   - c/w albuterol  - Completed  meropenem 1g q8h IV and  levaquin 750mg q24h; finished  on 3/20.     # H/o Dysphagia:   - Failed FEES, was on NG feeds  - gi consulted for PEG ,  [place] ([id number]) for consent - consent obtained>> Endoscopic PEG placment done ( 04/02)   - Started PEG feeding   - MOnitor for feed tolerance   - No concern for Refeeding Syndrome     #  Elevated Troponin , type 2 MI/  Sinus tachycardia  - Repeat EKG: Normal sinus rhythm  - Tachycardia resolved S/P Metoprolol.   - TTE 2/19 normal EF no DD or valve abnormalities    # Seizure Disorder  - c/w  Keppra   - seizure precautions  - keep Mg above 2.0     # H/o lower ext DVT   completed treatement    # Normocytic Anemia, anemia of chronic disease   - monitor H/H, keep Hb above 7.5     # Down Syndrome/  Cerebral Palsy/ functional quadriplegia   - supportive care  - prevent falls and aspiration     Dietary Recommendation:   Continue EN regimen with Jevity 1.2 as able to goal of 300 mL q6H + No carb Prosource 1x daily.  Provides: 1200 mL total volume, 1500 kcal, 81.6 gm Protein, 972 mL free water from formula + recommend 50 mL pre/post feeds + 30 mL pre/post prosource administration = 1432 mL total water     Will continue to follow at moderate risk; f/u in 5-7 days.    #MISC:   DVT : Changed to Eliquis   Diet : G tube Feeding   Activity: Ambulate as tolerated  GI : Pantoprazole      Pending :   Placement    58F w/ PMHx Down Syndrome, nonverbal at baseline, Hypothyroidism, Cerebral palsy and Seizure Disorders [facility] ED [facility] presented to ED for respiratory distress and high grade fever. Patient found to be septic on admission. Admitted to SDU for management of acute respiratory distress 2/2 CAP/Aspiration PNA (20 Jan 2024 18:22)  While pt was on the floor she developed dyspnea; consulted by pulmonary/critical care and was upgraded back to SDU 3/14. During CEU, she was tapered off of HFNC and complete course of antibiotics. She was stable for downgrade on 3/17.     # Sepsis POA / Acute hypoxic resp failure / RSV bronchiolitis /  Suspected aspiration    - On 2 L NC  now ,no difficulty breathing  , Tried weaning her off the O2 , unsuccessfull , 86% on RA   - continue midodrine 20 Q8H  - completed caspo (end 3/10) per ID   - c/w albuterol  - Completed  meropenem 1g q8h IV and  levaquin 750mg q24h; finished  on 3/20.     # H/o Dysphagia:   - Failed FEES, was on NG feeds  - gi consulted for PEG ,  [place] ([id number]) for consent - consent obtained>> Endoscopic PEG placment done ( 04/02)   - Started PEG feeding   - MOnitor for feed tolerance   - No concern for Refeeding Syndrome     #  Elevated Troponin , type 2 MI/  Sinus tachycardia  - Repeat EKG: Normal sinus rhythm  - Tachycardia resolved S/P Metoprolol.   - TTE 2/19 normal EF no DD or valve abnormalities    # Seizure Disorder  - c/w  Keppra   - seizure precautions  - keep Mg above 2.0     # H/o lower ext DVT   completed treatement    # Normocytic Anemia, anemia of chronic disease   - monitor H/H, keep Hb above 7.5     # Down Syndrome/  Cerebral Palsy/ functional quadriplegia   - supportive care  - prevent falls and aspiration     Dietary Recommendation:   Continue EN regimen with Jevity 1.2 as able to goal of 300 mL q6H + No carb Prosource 1x daily.  Provides: 1200 mL total volume, 1500 kcal, 81.6 gm Protein, 972 mL free water from formula + recommend 50 mL pre/post feeds + 30 mL pre/post prosource administration = 1432 mL total water     Will continue to follow at moderate risk; f/u in 5-7 days.    #MISC:   DVT : lovenox  Diet : G tube Feeding   Activity: Ambulate as tolerated  GI : Pantoprazole      Pending :   Placement

## 2024-04-07 LAB
ANION GAP SERPL CALC-SCNC: 12 MMOL/L — SIGNIFICANT CHANGE UP (ref 7–14)
BUN SERPL-MCNC: 17 MG/DL — SIGNIFICANT CHANGE UP (ref 10–20)
CALCIUM SERPL-MCNC: 9 MG/DL — SIGNIFICANT CHANGE UP (ref 8.4–10.4)
CHLORIDE SERPL-SCNC: 100 MMOL/L — SIGNIFICANT CHANGE UP (ref 98–110)
CO2 SERPL-SCNC: 25 MMOL/L — SIGNIFICANT CHANGE UP (ref 17–32)
CREAT SERPL-MCNC: 0.5 MG/DL — LOW (ref 0.7–1.5)
EGFR: 109 ML/MIN/1.73M2 — SIGNIFICANT CHANGE UP
GLUCOSE SERPL-MCNC: 95 MG/DL — SIGNIFICANT CHANGE UP (ref 70–99)
HCT VFR BLD CALC: 32.4 % — LOW (ref 37–47)
HGB BLD-MCNC: 9.6 G/DL — LOW (ref 12–16)
MCHC RBC-ENTMCNC: 26.2 PG — LOW (ref 27–31)
MCHC RBC-ENTMCNC: 29.6 G/DL — LOW (ref 32–37)
MCV RBC AUTO: 88.5 FL — SIGNIFICANT CHANGE UP (ref 81–99)
NRBC # BLD: 0 /100 WBCS — SIGNIFICANT CHANGE UP (ref 0–0)
PLATELET # BLD AUTO: 359 K/UL — SIGNIFICANT CHANGE UP (ref 130–400)
PMV BLD: 10.7 FL — HIGH (ref 7.4–10.4)
POTASSIUM SERPL-MCNC: 4.7 MMOL/L — SIGNIFICANT CHANGE UP (ref 3.5–5)
POTASSIUM SERPL-SCNC: 4.7 MMOL/L — SIGNIFICANT CHANGE UP (ref 3.5–5)
RBC # BLD: 3.66 M/UL — LOW (ref 4.2–5.4)
RBC # FLD: 24.7 % — HIGH (ref 11.5–14.5)
SODIUM SERPL-SCNC: 137 MMOL/L — SIGNIFICANT CHANGE UP (ref 135–146)
WBC # BLD: 8.88 K/UL — SIGNIFICANT CHANGE UP (ref 4.8–10.8)
WBC # FLD AUTO: 8.88 K/UL — SIGNIFICANT CHANGE UP (ref 4.8–10.8)

## 2024-04-07 PROCEDURE — 99232 SBSQ HOSP IP/OBS MODERATE 35: CPT

## 2024-04-07 RX ADMIN — LEVETIRACETAM 500 MILLIGRAM(S): 250 TABLET, FILM COATED ORAL at 05:17

## 2024-04-07 RX ADMIN — Medication 4 MILLILITER(S): at 20:49

## 2024-04-07 RX ADMIN — RALOXIFENE HYDROCHLORIDE 60 MILLIGRAM(S): 60 TABLET, COATED ORAL at 13:13

## 2024-04-07 RX ADMIN — Medication 2 SPRAY(S): at 05:17

## 2024-04-07 RX ADMIN — Medication 1 DROP(S): at 17:08

## 2024-04-07 RX ADMIN — Medication 1 APPLICATION(S): at 17:09

## 2024-04-07 RX ADMIN — Medication 250 MILLIGRAM(S): at 05:18

## 2024-04-07 RX ADMIN — ENOXAPARIN SODIUM 40 MILLIGRAM(S): 100 INJECTION SUBCUTANEOUS at 13:14

## 2024-04-07 RX ADMIN — Medication 1 DROP(S): at 05:19

## 2024-04-07 RX ADMIN — POLYETHYLENE GLYCOL 3350 17 GRAM(S): 17 POWDER, FOR SOLUTION ORAL at 22:52

## 2024-04-07 RX ADMIN — PANTOPRAZOLE SODIUM 40 MILLIGRAM(S): 20 TABLET, DELAYED RELEASE ORAL at 05:18

## 2024-04-07 RX ADMIN — ALBUTEROL 2.5 MILLIGRAM(S): 90 AEROSOL, METERED ORAL at 08:29

## 2024-04-07 RX ADMIN — ALBUTEROL 2.5 MILLIGRAM(S): 90 AEROSOL, METERED ORAL at 20:49

## 2024-04-07 RX ADMIN — Medication 4 MILLILITER(S): at 14:33

## 2024-04-07 RX ADMIN — ALBUTEROL 2.5 MILLIGRAM(S): 90 AEROSOL, METERED ORAL at 14:32

## 2024-04-07 RX ADMIN — MIDODRINE HYDROCHLORIDE 20 MILLIGRAM(S): 2.5 TABLET ORAL at 13:14

## 2024-04-07 RX ADMIN — Medication 4 MILLILITER(S): at 08:29

## 2024-04-07 RX ADMIN — Medication 1 APPLICATION(S): at 13:14

## 2024-04-07 RX ADMIN — Medication 1 APPLICATION(S): at 05:19

## 2024-04-07 RX ADMIN — LEVETIRACETAM 500 MILLIGRAM(S): 250 TABLET, FILM COATED ORAL at 17:07

## 2024-04-07 RX ADMIN — GABAPENTIN 300 MILLIGRAM(S): 400 CAPSULE ORAL at 17:07

## 2024-04-07 RX ADMIN — Medication 2 SPRAY(S): at 13:14

## 2024-04-07 RX ADMIN — SENNA PLUS 2 TABLET(S): 8.6 TABLET ORAL at 22:52

## 2024-04-07 RX ADMIN — MIDODRINE HYDROCHLORIDE 20 MILLIGRAM(S): 2.5 TABLET ORAL at 05:17

## 2024-04-07 RX ADMIN — Medication 2 SPRAY(S): at 22:52

## 2024-04-07 RX ADMIN — GABAPENTIN 300 MILLIGRAM(S): 400 CAPSULE ORAL at 05:16

## 2024-04-07 NOTE — PROGRESS NOTE ADULT - REASON FOR ADMISSION
Respiratory Distress
58F admit for Acute Respiratory Failure 2/2 RSV/ Pneumonia/Lung Abscess
Respiratory Distress
58F admit for Acute Respiratory Failure 2/2 RSV/Aspiration Pneumonia
Respiratory Distress
58F admit for Acute Respiratory Failure 2/2 RSV/ Pneumonia/Lung Abscess
Respiratory Distress
Respiratory Distress

## 2024-04-07 NOTE — PROGRESS NOTE ADULT - SUBJECTIVE AND OBJECTIVE BOX
HPI  Patient is a 58y old Female who presents with a chief complaint of Respiratory Distress (06 Apr 2024 11:28)    Currently admitted to medicine with the primary diagnosis of Sepsis with acute hypoxic respiratory failure       Today is hospital day 78d.     INTERVAL HPI / OVERNIGHT EVENTS:  Patient was seen and examined at bedside  low grade temp 100.7 noticed yesterday; no further episodes  per nursing- patient tube feeds to gravity is not going well; flushes working fine          PAST MEDICAL & SURGICAL HISTORY  Down syndrome    Osteoporosis    Mild anemia    Neuropathy    S/P debridement  of R hip on 3/2/21      ALLERGIES  No Known Allergies    MEDICATIONS  STANDING MEDICATIONS  acetylcysteine 20%  Inhalation 4 milliLiter(s) Inhalation every 6 hours  albuterol    0.083% 2.5 milliGRAM(s) Nebulizer every 6 hours  albuterol    90 MICROgram(s) HFA Inhaler 1 Puff(s) Inhalation every 4 hours  albuterol/ipratropium for Nebulization 3 milliLiter(s) Nebulizer every 6 hours  AQUAPHOR (petrolatum Ointment) 1 Application(s) Topical two times a day  artificial  tears Solution 1 Drop(s) Both EYES two times a day  chlorhexidine 2% Cloths 1 Application(s) Topical daily  enoxaparin Injectable 40 milliGRAM(s) SubCutaneous every 24 hours  erythromycin   Ointment 1 Application(s) Both EYES daily  gabapentin 300 milliGRAM(s) Oral every 12 hours  levETIRAcetam  Solution 500 milliGRAM(s) Oral two times a day  melatonin 3 milliGRAM(s) Oral at bedtime  midodrine. 20 milliGRAM(s) Oral three times a day  pantoprazole  Injectable 40 milliGRAM(s) IV Push every 24 hours  polyethylene glycol 3350 17 Gram(s) Oral at bedtime  raloxifene 60 milliGRAM(s) Oral daily  saccharomyces boulardii 250 milliGRAM(s) Oral two times a day  senna 2 Tablet(s) Oral at bedtime  sodium chloride 0.65% Nasal 2 Spray(s) Both Nostrils three times a day    PRN MEDICATIONS  acetaminophen     Tablet .. 650 milliGRAM(s) Oral every 8 hours PRN  aluminum hydroxide/magnesium hydroxide/simethicone Suspension 30 milliLiter(s) Oral every 4 hours PRN  ondansetron Injectable 4 milliGRAM(s) IV Push every 8 hours PRN    VITALS:  T(F): 98  HR: 61  BP: 118/57  RR: 18  SpO2: --    PHYSICAL EXAM  GEN: no distress, comfortable  PULM: BS heard b/l equal, No wheezing  CVS: S1S2 present, systolic murmur present, no rubs or gallops  ABD: Soft, PEG tube present, non-distended, no guarding; non-tender  EXT: feet padded  NEURO: Awake; non verbal; LE contractures present; does not follow commands    LABS                        9.6    8.88  )-----------( 359      ( 07 Apr 2024 09:15 )             32.4     04-07    137  |  100  |  17  ----------------------------<  95  4.7   |  25  |  0.5<L>    Ca    9.0      07 Apr 2024 09:15    TPro  6.8  /  Alb  3.3<L>  /  TBili  0.3  /  DBili  x   /  AST  20  /  ALT  21  /  AlkPhos  107  04-06      Urinalysis Basic - ( 07 Apr 2024 09:15 )    Color: x / Appearance: x / SG: x / pH: x  Gluc: 95 mg/dL / Ketone: x  / Bili: x / Urobili: x   Blood: x / Protein: x / Nitrite: x   Leuk Esterase: x / RBC: x / WBC x   Sq Epi: x / Non Sq Epi: x / Bacteria: x                RADIOLOGY

## 2024-04-07 NOTE — PROGRESS NOTE ADULT - ASSESSMENT
58F w/ PMHx Down Syndrome, nonverbal at baseline, Hypothyroidism, Cerebral palsy and Seizure Disorders presents to the ED from nursing facility[facility]group home ([facility]) presented to ED for respiratory distress and high grade fever. Patient found to be septic on admission. Admitted to SDU for management of acute respiratory distress 2/2 CAP/Aspiration PNA (20 Jan 2024 18:22)  While pt was on the floor she developed dyspnea  consulted by pulmonary/critical care and was upgraded back to SDU.    A/P    # Dysphagia- s/p PEG   NG tube feeding--change to bolus feeding; monitor for tolerance    # low grade fever- 1 episode  monitor  no leucocytosis    # Sepsis POA - improved  - Acute hypoxic resp failure - multifactorial RSV bronchiolitis on admission; then concern for possible gram negative pneumonia and suspected aspiration  - Staph epidermidis bacteremia in 3/14  - staph hominis  on presentation -  contaminant  repeat cultures - no growht  s/p anitbitoics  - currently on nasal cannula 2L    # Seizure Disorder  - c/w  Keppra   - seizure precautions  - keep Mg above 2.0     # H/o lower ext DVT   s/p treatment    #  Hypothyroidism  - TSH 0.51    # microcytic Anemia, anemia of chronic disease  stable  ferritin- 281, transferring saturation <6%; possible iron deficiency- s/p venofer-- completed 4 days    #  Elevated Troponin , type 2 MI/  Sinus tachycardia  - TTE 2/19 normal EF no DD or valve abnormalities  - on Raloxifene     # h/o duodenal ulceration  continue PPI daily    # Down Syndrome/  Cerebral Palsy/ functional quadriplegia   - supportive care    # multiple pressure ulcers- continue wound care per recommendation from wound care; d/w nursing staff    # hypotension- on midodrine    pending: monitoring for fever,  tolerance of tube feeds

## 2024-04-08 ENCOUNTER — TRANSCRIPTION ENCOUNTER (OUTPATIENT)
Age: 59
End: 2024-04-08

## 2024-04-08 VITALS — SYSTOLIC BLOOD PRESSURE: 107 MMHG | HEART RATE: 69 BPM | TEMPERATURE: 98 F | DIASTOLIC BLOOD PRESSURE: 67 MMHG

## 2024-04-08 LAB
ANION GAP SERPL CALC-SCNC: 12 MMOL/L — SIGNIFICANT CHANGE UP (ref 7–14)
BASOPHILS # BLD AUTO: 0.05 K/UL — SIGNIFICANT CHANGE UP (ref 0–0.2)
BASOPHILS NFR BLD AUTO: 0.8 % — SIGNIFICANT CHANGE UP (ref 0–1)
BUN SERPL-MCNC: 19 MG/DL — SIGNIFICANT CHANGE UP (ref 10–20)
CALCIUM SERPL-MCNC: 9.1 MG/DL — SIGNIFICANT CHANGE UP (ref 8.4–10.4)
CHLORIDE SERPL-SCNC: 102 MMOL/L — SIGNIFICANT CHANGE UP (ref 98–110)
CO2 SERPL-SCNC: 25 MMOL/L — SIGNIFICANT CHANGE UP (ref 17–32)
CREAT SERPL-MCNC: 0.5 MG/DL — LOW (ref 0.7–1.5)
EGFR: 109 ML/MIN/1.73M2 — SIGNIFICANT CHANGE UP
EOSINOPHIL # BLD AUTO: 0.43 K/UL — SIGNIFICANT CHANGE UP (ref 0–0.7)
EOSINOPHIL NFR BLD AUTO: 6.5 % — SIGNIFICANT CHANGE UP (ref 0–8)
GLUCOSE SERPL-MCNC: 160 MG/DL — HIGH (ref 70–99)
HCT VFR BLD CALC: 31.3 % — LOW (ref 37–47)
HGB BLD-MCNC: 9.8 G/DL — LOW (ref 12–16)
IMM GRANULOCYTES NFR BLD AUTO: 0.3 % — SIGNIFICANT CHANGE UP (ref 0.1–0.3)
LYMPHOCYTES # BLD AUTO: 1.95 K/UL — SIGNIFICANT CHANGE UP (ref 1.2–3.4)
LYMPHOCYTES # BLD AUTO: 29.4 % — SIGNIFICANT CHANGE UP (ref 20.5–51.1)
MAGNESIUM SERPL-MCNC: 2.3 MG/DL — SIGNIFICANT CHANGE UP (ref 1.8–2.4)
MCHC RBC-ENTMCNC: 27.3 PG — SIGNIFICANT CHANGE UP (ref 27–31)
MCHC RBC-ENTMCNC: 31.3 G/DL — LOW (ref 32–37)
MCV RBC AUTO: 87.2 FL — SIGNIFICANT CHANGE UP (ref 81–99)
MONOCYTES # BLD AUTO: 0.45 K/UL — SIGNIFICANT CHANGE UP (ref 0.1–0.6)
MONOCYTES NFR BLD AUTO: 6.8 % — SIGNIFICANT CHANGE UP (ref 1.7–9.3)
NEUTROPHILS # BLD AUTO: 3.74 K/UL — SIGNIFICANT CHANGE UP (ref 1.4–6.5)
NEUTROPHILS NFR BLD AUTO: 56.2 % — SIGNIFICANT CHANGE UP (ref 42.2–75.2)
NRBC # BLD: 0 /100 WBCS — SIGNIFICANT CHANGE UP (ref 0–0)
PLATELET # BLD AUTO: 300 K/UL — SIGNIFICANT CHANGE UP (ref 130–400)
PMV BLD: 10.1 FL — SIGNIFICANT CHANGE UP (ref 7.4–10.4)
POTASSIUM SERPL-MCNC: 4.9 MMOL/L — SIGNIFICANT CHANGE UP (ref 3.5–5)
POTASSIUM SERPL-SCNC: 4.9 MMOL/L — SIGNIFICANT CHANGE UP (ref 3.5–5)
RBC # BLD: 3.59 M/UL — LOW (ref 4.2–5.4)
RBC # FLD: 23.9 % — HIGH (ref 11.5–14.5)
SODIUM SERPL-SCNC: 139 MMOL/L — SIGNIFICANT CHANGE UP (ref 135–146)
WBC # BLD: 6.64 K/UL — SIGNIFICANT CHANGE UP (ref 4.8–10.8)
WBC # FLD AUTO: 6.64 K/UL — SIGNIFICANT CHANGE UP (ref 4.8–10.8)

## 2024-04-08 PROCEDURE — 99239 HOSP IP/OBS DSCHRG MGMT >30: CPT | Mod: GC

## 2024-04-08 RX ORDER — IPRATROPIUM/ALBUTEROL SULFATE 18-103MCG
3 AEROSOL WITH ADAPTER (GRAM) INHALATION
Qty: 0 | Refills: 0 | DISCHARGE
Start: 2024-04-08

## 2024-04-08 RX ORDER — DOCUSATE SODIUM 100 MG
1 CAPSULE ORAL
Refills: 0 | DISCHARGE

## 2024-04-08 RX ORDER — PANTOPRAZOLE SODIUM 20 MG/1
1 TABLET, DELAYED RELEASE ORAL
Qty: 30 | Refills: 0
Start: 2024-04-08 | End: 2024-05-07

## 2024-04-08 RX ORDER — DOCUSATE SODIUM 100 MG
1 CAPSULE ORAL
Qty: 30 | Refills: 0
Start: 2024-04-08 | End: 2024-05-07

## 2024-04-08 RX ORDER — CALCIUM CARBONATE 500(1250)
1 TABLET ORAL
Refills: 0 | DISCHARGE

## 2024-04-08 RX ORDER — FAMOTIDINE 10 MG/ML
1 INJECTION INTRAVENOUS
Qty: 60 | Refills: 0
Start: 2024-04-08 | End: 2024-05-07

## 2024-04-08 RX ORDER — PETROLATUM,WHITE
1 JELLY (GRAM) TOPICAL
Qty: 0 | Refills: 0 | DISCHARGE
Start: 2024-04-08

## 2024-04-08 RX ORDER — LEVETIRACETAM 250 MG/1
1 TABLET, FILM COATED ORAL
Qty: 60 | Refills: 0
Start: 2024-04-08 | End: 2024-05-07

## 2024-04-08 RX ORDER — ERYTHROMYCIN BASE 5 MG/GRAM
1 OINTMENT (GRAM) OPHTHALMIC (EYE)
Qty: 0 | Refills: 0 | DISCHARGE
Start: 2024-04-08

## 2024-04-08 RX ORDER — SACCHAROMYCES BOULARDII 250 MG
1 POWDER IN PACKET (EA) ORAL
Qty: 0 | Refills: 0 | DISCHARGE
Start: 2024-04-08

## 2024-04-08 RX ORDER — CALCIUM CARBONATE 500(1250)
1 TABLET ORAL
Qty: 30 | Refills: 0
Start: 2024-04-08 | End: 2024-05-07

## 2024-04-08 RX ORDER — SENNA PLUS 8.6 MG/1
2 TABLET ORAL
Qty: 0 | Refills: 0 | DISCHARGE
Start: 2024-04-08

## 2024-04-08 RX ORDER — ACETYLCYSTEINE 200 MG/ML
4 VIAL (ML) MISCELLANEOUS
Qty: 0 | Refills: 0 | DISCHARGE
Start: 2024-04-08

## 2024-04-08 RX ADMIN — GABAPENTIN 300 MILLIGRAM(S): 400 CAPSULE ORAL at 05:58

## 2024-04-08 RX ADMIN — Medication 250 MILLIGRAM(S): at 05:57

## 2024-04-08 RX ADMIN — ALBUTEROL 2.5 MILLIGRAM(S): 90 AEROSOL, METERED ORAL at 08:36

## 2024-04-08 RX ADMIN — RALOXIFENE HYDROCHLORIDE 60 MILLIGRAM(S): 60 TABLET, COATED ORAL at 11:03

## 2024-04-08 RX ADMIN — Medication 1 APPLICATION(S): at 06:01

## 2024-04-08 RX ADMIN — Medication 4 MILLILITER(S): at 08:37

## 2024-04-08 RX ADMIN — Medication 3 MILLILITER(S): at 13:42

## 2024-04-08 RX ADMIN — CHLORHEXIDINE GLUCONATE 1 APPLICATION(S): 213 SOLUTION TOPICAL at 11:03

## 2024-04-08 RX ADMIN — Medication 2 SPRAY(S): at 13:00

## 2024-04-08 RX ADMIN — PANTOPRAZOLE SODIUM 40 MILLIGRAM(S): 20 TABLET, DELAYED RELEASE ORAL at 05:56

## 2024-04-08 RX ADMIN — MIDODRINE HYDROCHLORIDE 20 MILLIGRAM(S): 2.5 TABLET ORAL at 11:03

## 2024-04-08 RX ADMIN — Medication 1 APPLICATION(S): at 11:03

## 2024-04-08 RX ADMIN — ENOXAPARIN SODIUM 40 MILLIGRAM(S): 100 INJECTION SUBCUTANEOUS at 11:03

## 2024-04-08 RX ADMIN — MIDODRINE HYDROCHLORIDE 20 MILLIGRAM(S): 2.5 TABLET ORAL at 06:03

## 2024-04-08 RX ADMIN — Medication 1 DROP(S): at 05:59

## 2024-04-08 RX ADMIN — Medication 2 SPRAY(S): at 05:58

## 2024-04-08 RX ADMIN — LEVETIRACETAM 500 MILLIGRAM(S): 250 TABLET, FILM COATED ORAL at 05:57

## 2024-04-08 RX ADMIN — Medication 4 MILLILITER(S): at 13:43

## 2024-04-08 NOTE — DISCHARGE NOTE PROVIDER - NSDCCPCAREPLAN_GEN_ALL_CORE_FT
PRINCIPAL DISCHARGE DIAGNOSIS  Diagnosis: Sepsis with acute hypoxic respiratory failure  Assessment and Plan of Treatment: Pneumonia  Pneumonia is an infection of the lungs. Pneumonia may be caused by bacteria, viruses, or funguses. Symptoms include coughing, fever, chest pain when breathing deeply or coughing, shortness of breath, fatigue, or muscle aches. Pneumonia can be diagnosed with a medical history and physical exam, as well as other tests which may include a chest X-ray. If you were prescribed an antibiotic medicine, take it as told by your health care provider and do not stop taking the antibiotic even if you start to feel better. Do not use tobacco products, including cigarettes, chewing tobacco, and e-cigarettes.  SEEK IMMEDIATE MEDICAL CARE IF YOU HAVE ANY OF THE FOLLOWING SYMPTOMS: worsening shortness of breath, worsening chest pain, coughing up blood, change in mental status, lightheadedness/dizziness.        SECONDARY DISCHARGE DIAGNOSES  Diagnosis: Sepsis  Assessment and Plan of Treatment: Sepsis is a serious condition that occurs when the body overreacts to an infection. It is also called systemic inflammatory response syndrome (SIRS) with infection. An infection is usually caused by bacteria that attack the body. The body's defense system normally fights off infection within the affected body part. With sepsis, the body overreacts and causes symptoms to occur throughout the body. This leads to uncontrolled and widespread inflammation and clotting in small blood vessels. Blood flow to different body parts decreases and may lead to organ failure. Sepsis requires immediate treatment.  AFTER YOU LEAVE:  Follow up with your healthcare provider as directed:  Write down your questions so you remember to ask them during your visits.  Prevent infection:  The following are ways that you can help prevent infection, which can lead to sepsis:  Ask about vaccines: Vaccines can decrease your risk of getting certain infections, such as the flu or pneumonia. Ask your healthcare provider if you should get a flu or pneumonia vaccine, and when to get the vaccine.  Avoid the spread of germs:  Wash your hands often with soap and water. Carry germ-killing gel with you. You can use the gel to clean your hands when there is no soap and water available.  Do not touch your eyes, nose, or mouth unless you have washed your hands first.  Always cover your mouth when you cough. Cough into a tissue or your shirtsleeve so you do not spread germs from your hands.  Try to avoid people who have a cold or the flu. If you are sick, stay away from others as much as possible.  Contact your healthcare provider if:  You have a fever.  You have questions or concerns about your condition or care.  Seek care immediately or call 911 if:  You have increased swelling in your legs, feet, or abdomen.  You are short of breath or you cough up blood.  You have a fast heart rate and your chest hurts.  You feel so dizzy that you have trouble standing up.  Your lips or fingernails are blue.

## 2024-04-08 NOTE — DISCHARGE NOTE PROVIDER - CARE PROVIDER_API CALL
Maggie Crow J  Internal Medicine  [address] [place] [address]  Phone: [phone]  Fax: [phone]  Follow Up Time: 2 weeks   stable in room air

## 2024-04-08 NOTE — DISCHARGE NOTE PROVIDER - HOSPITAL COURSE
58F w/ PMHx Down Syndrome, nonverbal at baseline, Hypothyroidism, Cerebral palsy and Seizure Disorders presents to the ED from nursing facility[facility] ([facility]) presented to ED for respiratory distress and high grade fever. Patient found to be septic on admission. Admitted to SDU for management of acute respiratory distress 2/2 CAP/Aspiration PNA (20 Jan 2024 18:22). Patient had a long hospital course complicated by multiple aspirations and upgrades to SDU and MICU requiring oxygen (HFNC).   Currently she is on NC and is s/p PEG tube     # Dysphagia- s/p PEG    # Sepsis POA - improved  - Acute hypoxic resp failure - multifactorial RSV bronchiolitis on admission; then concern for possible gram negative pneumonia and suspected aspiration  - Staph epidermidis bacteremia in 3/14  - staph hominis  on presentation -  contaminant  repeat cultures - no growth   s/p anitbitoics  - currently on nasal cannula 2L    # Seizure Disorder  - c/w  Keppra   - seizure precautions  - keep Mg above 2.0     # H/o lower ext DVT   s/p treatment    # microcytic Anemia, anemia of chronic disease  stable  ferritin- 281, transferring saturation <6%; possible iron deficiency- s/p venofer-- completed 4 days    #  Elevated Troponin , type 2 MI/  Sinus tachycardia  - TTE 2/19 normal EF no DD or valve abnormalities  - on Raloxifene     # h/o duodenal ulceration  continue PPI daily    # Down Syndrome/  Cerebral Palsy/ functional quadriplegia   - supportive care    # multiple pressure ulcers- continue wound care per recommendation from wound care; d/w nursing staff    # hypotension- on midodrine    Patient is stable and can be discharged back to NH.    58F w/ PMHx Down Syndrome, nonverbal at baseline, Hypothyroidism, Cerebral palsy and Seizure Disorders presents to the ED from nursing facility/group home ([facility]) presented to ED for respiratory distress and high grade fever. Patient found to be septic on admission. Admitted to SDU for management of acute respiratory distress 2/2 CAP/Aspiration PNA (20 Jan 2024 18:22). Patient had a long hospital course complicated by multiple aspirations and upgrades to SDU and MICU requiring oxygen (HFNC).   Currently she is on NC and is s/p PEG tube     # Dysphagia- s/p PEG    # Sepsis POA - improved  - Acute hypoxic resp failure - multifactorial RSV bronchiolitis on admission; then concern for possible gram negative pneumonia and suspected aspiration  - Staph epidermidis bacteremia in 3/14  - staph hominis  on presentation -  contaminant  repeat cultures - no growth   s/p anitbitoics  - currently on nasal cannula 2L    # Seizure Disorder  - c/w  Keppra   - seizure precautions  - keep Mg above 2.0     # H/o lower ext DVT - patient completed treatment    # microcytic Anemia, anemia of chronic disease  stable  ferritin- 281, transferring saturation <6%; possible iron deficiency- s/p venofer-- completed 4 days    #  Elevated Troponin , type 2 MI/  Sinus tachycardia  - TTE 2/19 normal EF no DD or valve abnormalities  - on Raloxifene     # h/o duodenal ulceration    # Down Syndrome/  Cerebral Palsy/ functional quadriplegia   - supportive care    # multiple pressure ulcers- continue wound care per recommendation from wound care; d/w nursing staff    # hypotension- on midodrine    Patient is stable and can be discharged back to NH.    58F w/ PMHx Down Syndrome, nonverbal at baseline, Hypothyroidism, Cerebral palsy and Seizure Disorders presents to the ED from nursing facility/group home ([facility]) presented to ED for respiratory distress and high grade fever. Patient found to be septic on admission. Admitted to SDU for management of acute respiratory distress 2/2 CAP/Aspiration PNA (20 Jan 2024 18:22). Patient had a long hospital course complicated by multiple aspirations and upgrades to SDU and MICU requiring oxygen (HFNC).   Currently she is on NC and is s/p PEG tube     # Dysphagia- s/p PEG    # Sepsis POA - improved  - Acute hypoxic resp failure - multifactorial RSV bronchiolitis on admission; then concern for possible gram negative pneumonia and suspected aspiration  - Staph epidermidis bacteremia in 3/14  - staph hominis  on presentation -  contaminant  repeat cultures - no growth   s/p anitbitoics  - currently on nasal cannula 2L    # Seizure Disorder  - c/w  Keppra     # H/o lower ext DVT - patient completed treatment    # microcytic Anemia, anemia of chronic disease  stable  ferritin- 281, transferring saturation <6%; possible iron deficiency- s/p venofer-- completed 4 days    #  Elevated Troponin , type 2 MI/  Sinus tachycardia  - TTE 2/19 normal EF no DD or valve abnormalities  - on Raloxifene     # h/o duodenal ulceration    # Down Syndrome/  Cerebral Palsy/ functional quadriplegia   - supportive care    # multiple pressure ulcers- continue wound care per recommendation from wound care; d/w nursing staff    # hypotension- on midodrine    Patient is stable for discharge

## 2024-04-08 NOTE — DISCHARGE NOTE PROVIDER - NSDCFUSCHEDAPPT_GEN_ALL_CORE_FT
Royer Cooper  [facility] PreAdmits  Scheduled Appointment: 04/09/2024    Royer Cooper  [facility]  OPHTHALM  [address]  Scheduled Appointment: 04/09/2024    Óscar Calero  [facility] PreAdmits  Scheduled Appointment: 05/01/2024    [facility]  GASTRO  [address]  Scheduled Appointment: 05/01/2024    Sarbjit Feng  [facility]  NEUROLOGY [address]  Scheduled Appointment: 06/05/2024

## 2024-04-08 NOTE — DISCHARGE NOTE PROVIDER - NSDCFUADDINST_GEN_ALL_CORE_FT
continue wound care per recommendation    Cleanse sacrococcygeal region with soap and water.   Pat dry, continue to apply Aquaphor  twice a day and prn for soiling.   Maintain pressure injury prevention.   Keep skin clean.   Maintain incontinence care.     Local Wound Care for leg  -Right: Betadine, gauze, abd, kerlix - daily  left: allevyn and pad

## 2024-04-08 NOTE — DISCHARGE NOTE PROVIDER - NSDCMRMEDTOKEN_GEN_ALL_CORE_FT
apixaban 5 mg oral tablet: 1 tab(s) orally every 12 hours  docusate sodium 100 mg oral capsule: 1 cap(s) orally 2 times a day  gabapentin 300 mg oral tablet: 1 cap(s) orally every 12 hours  levETIRAcetam 500 mg oral tablet: 1 tab(s) orally 2 times a day  Melatonin 3 mg oral tablet: 1 tab(s) orally once a day (at bedtime)  midodrine 10 mg oral tablet: 1 tab(s) orally 3 times a day Please discontinue 5mg TID dose, and start 10mg TID dose  ocular lubricant ophthalmic solution: 1 drop(s) to each affected eye 2 times a day  Oyster Shell 500 (1250 mg calcium carbonate) oral tablet: 1 tab(s) orally 2 times a day  Protonix 40 mg oral delayed release tablet: 1 tab(s) orally once a day (after a meal)  raloxifene 60 mg oral tablet: 1 tab(s) orally once a day   acetylcysteine 20% inhalation solution: 4 milliliter(s) inhaled every 6 hours  docusate sodium 100 mg oral capsule: 1 cap(s) orally 2 times a day  erythromycin 0.5% ophthalmic ointment: 1 application to each affected eye once a day  gabapentin 300 mg oral tablet: 1 cap(s) orally every 12 hours  ipratropium-albuterol 0.5 mg-2.5 mg/3 mL inhalation solution: 3 milliliter(s) inhaled every 6 hours  levETIRAcetam 500 mg oral tablet: 1 tab(s) orally 2 times a day  Melatonin 3 mg oral tablet: 1 tab(s) orally once a day (at bedtime)  midodrine 10 mg oral tablet: 1 tab(s) orally 3 times a day Please discontinue 5mg TID dose, and start 10mg TID dose  ocular lubricant ophthalmic solution: 1 drop(s) to each affected eye 2 times a day  Oyster Shell 500 (1250 mg calcium carbonate) oral tablet: 1 tab(s) orally 2 times a day  Pepcid 40 mg oral tablet: 1 tab(s) orally 2 times a day  petrolatum topical ointment: 1 Apply topically to affected area 2 times a day  Protonix 40 mg oral delayed release tablet: 1 tab(s) orally once a day (after a meal)  raloxifene 60 mg oral tablet: 1 tab(s) orally once a day  saccharomyces boulardii lyo 250 mg oral capsule: 1 cap(s) orally 2 times a day  senna leaf extract oral tablet: 2 tab(s) orally once a day (at bedtime)   acetylcysteine 20% inhalation solution: 4 milliliter(s) inhaled every 6 hours as needed for mucous plugging or shortness of breath  docusate sodium 100 mg oral capsule: 1 cap(s) orally 2 times a day  gabapentin 300 mg oral tablet: 1 cap(s) orally every 12 hours  ipratropium-albuterol 0.5 mg-2.5 mg/3 mL inhalation solution: 3 milliliter(s) inhaled every 6 hours as needed for  shortness of breath and/or wheezing  levETIRAcetam 500 mg oral tablet: 1 tab(s) orally 2 times a day  Melatonin 3 mg oral tablet: 1 tab(s) orally once a day (at bedtime)  midodrine 10 mg oral tablet: 1 tab(s) orally 3 times a day Please discontinue 5mg TID dose, and start 10mg TID dose  ocular lubricant ophthalmic solution: 1 drop(s) to each affected eye 2 times a day  Oyster Shell 500 (1250 mg calcium carbonate) oral tablet: 1 tab(s) orally 2 times a day  Pepcid 40 mg oral tablet: 1 tab(s) orally 2 times a day  petrolatum topical ointment: 1 Apply topically to affected area 2 times a day  Protonix 40 mg oral delayed release tablet: 1 tab(s) orally once a day (after a meal)  raloxifene 60 mg oral tablet: 1 tab(s) orally once a day  saccharomyces boulardii lyo 250 mg oral capsule: 1 cap(s) orally 2 times a day  senna leaf extract oral tablet: 2 tab(s) orally once a day (at bedtime)   acetylcysteine 20% inhalation solution: 4 milliliter(s) inhaled every 6 hours as needed for mucous plugging or shortness of breath  docusate sodium 100 mg oral tablet: 1 tab(s) by PEG tube once a day as needed for  constipation  gabapentin 300 mg oral tablet: 1 cap(s) by PEG tube every 12 hours  ipratropium-albuterol 0.5 mg-2.5 mg/3 mL inhalation solution: 3 milliliter(s) inhaled every 6 hours as needed for  shortness of breath and/or wheezing  levETIRAcetam 500 mg oral tablet: 1 tab(s) by PEG tube 2 times a day  Melatonin 3 mg oral tablet: 1 tab(s) by PEG tube once a day (at bedtime)  midodrine 10 mg oral tablet: 1 tab(s) orally 3 times a day Please discontinue 5mg TID dose, and start 10mg TID dose  ocular lubricant ophthalmic solution: 1 drop(s) to each affected eye 2 times a day  Oyster Shell 500 (1250 mg calcium carbonate) oral tablet: 1 tab(s) by PEG tube once a day  Pepcid 40 mg oral tablet: 1 tab(s) by PEG tube 2 times a day  petrolatum topical ointment: 1 Apply topically to affected area 2 times a day  Protonix 40 mg oral delayed release tablet: 1 tab(s) by PEG tube once a day  raloxifene 60 mg oral tablet: 1 tab(s) by PEG tube once a day  senna leaf extract oral tablet: 2 tab(s) orally once a day (at bedtime)

## 2024-04-08 NOTE — DISCHARGE NOTE PROVIDER - ATTENDING DISCHARGE PHYSICAL EXAMINATION:
PHYSICAL EXAM    GEN: no distress, comfortable  PULM: BS heard b/l equal, No wheezing  CVS: S1S2 present, systolic murmur preset, no rubs or gallops  ABD: PEG tube present, Soft, non-distended, no guarding; non-tender  EXT: LE contractures present  NEURO: Arousable

## 2024-04-11 ENCOUNTER — EMERGENCY (EMERGENCY)
Facility: HOSPITAL | Age: 59
LOS: 0 days | Discharge: HOME | End: 2024-04-12
Attending: EMERGENCY MEDICINE
Payer: COMMERCIAL

## 2024-04-11 VITALS
OXYGEN SATURATION: 100 % | HEART RATE: 87 BPM | HEIGHT: 55 IN | DIASTOLIC BLOOD PRESSURE: 54 MMHG | RESPIRATION RATE: 16 BRPM | TEMPERATURE: 98 F | SYSTOLIC BLOOD PRESSURE: 97 MMHG

## 2024-04-11 DIAGNOSIS — E03.9 HYPOTHYROIDISM, UNSPECIFIED: ICD-10-CM

## 2024-04-11 DIAGNOSIS — R56.9 UNSPECIFIED CONVULSIONS: ICD-10-CM

## 2024-04-11 DIAGNOSIS — G40.909 EPILEPSY, UNSPECIFIED, NOT INTRACTABLE, WITHOUT STATUS EPILEPTICUS: ICD-10-CM

## 2024-04-11 DIAGNOSIS — Z98.890 OTHER SPECIFIED POSTPROCEDURAL STATES: Chronic | ICD-10-CM

## 2024-04-11 DIAGNOSIS — G80.9 CEREBRAL PALSY, UNSPECIFIED: ICD-10-CM

## 2024-04-11 DIAGNOSIS — I95.9 HYPOTENSION, UNSPECIFIED: ICD-10-CM

## 2024-04-11 LAB
ALBUMIN SERPL ELPH-MCNC: 3.2 G/DL — LOW (ref 3.5–5.2)
ALP SERPL-CCNC: 97 U/L — SIGNIFICANT CHANGE UP (ref 30–115)
ALT FLD-CCNC: 21 U/L — SIGNIFICANT CHANGE UP (ref 0–41)
ANION GAP SERPL CALC-SCNC: 13 MMOL/L — SIGNIFICANT CHANGE UP (ref 7–14)
AST SERPL-CCNC: 50 U/L — HIGH (ref 0–41)
BASOPHILS # BLD AUTO: 0.05 K/UL — SIGNIFICANT CHANGE UP (ref 0–0.2)
BASOPHILS NFR BLD AUTO: 0.9 % — SIGNIFICANT CHANGE UP (ref 0–1)
BILIRUB SERPL-MCNC: <0.2 MG/DL — SIGNIFICANT CHANGE UP (ref 0.2–1.2)
BUN SERPL-MCNC: 16 MG/DL — SIGNIFICANT CHANGE UP (ref 10–20)
CALCIUM SERPL-MCNC: 9.2 MG/DL — SIGNIFICANT CHANGE UP (ref 8.4–10.5)
CHLORIDE SERPL-SCNC: 101 MMOL/L — SIGNIFICANT CHANGE UP (ref 98–110)
CO2 SERPL-SCNC: 26 MMOL/L — SIGNIFICANT CHANGE UP (ref 17–32)
CREAT SERPL-MCNC: 0.6 MG/DL — LOW (ref 0.7–1.5)
EGFR: 105 ML/MIN/1.73M2 — SIGNIFICANT CHANGE UP
EOSINOPHIL # BLD AUTO: 0.23 K/UL — SIGNIFICANT CHANGE UP (ref 0–0.7)
EOSINOPHIL NFR BLD AUTO: 4 % — SIGNIFICANT CHANGE UP (ref 0–8)
GLUCOSE SERPL-MCNC: 114 MG/DL — HIGH (ref 70–99)
HCT VFR BLD CALC: 35.9 % — LOW (ref 37–47)
HGB BLD-MCNC: 11.3 G/DL — LOW (ref 12–16)
IMM GRANULOCYTES NFR BLD AUTO: 0.7 % — HIGH (ref 0.1–0.3)
LYMPHOCYTES # BLD AUTO: 1.25 K/UL — SIGNIFICANT CHANGE UP (ref 1.2–3.4)
LYMPHOCYTES # BLD AUTO: 21.7 % — SIGNIFICANT CHANGE UP (ref 20.5–51.1)
MAGNESIUM SERPL-MCNC: 2.3 MG/DL — SIGNIFICANT CHANGE UP (ref 1.8–2.4)
MCHC RBC-ENTMCNC: 27.9 PG — SIGNIFICANT CHANGE UP (ref 27–31)
MCHC RBC-ENTMCNC: 31.5 G/DL — LOW (ref 32–37)
MCV RBC AUTO: 88.6 FL — SIGNIFICANT CHANGE UP (ref 81–99)
MONOCYTES # BLD AUTO: 0.44 K/UL — SIGNIFICANT CHANGE UP (ref 0.1–0.6)
MONOCYTES NFR BLD AUTO: 7.7 % — SIGNIFICANT CHANGE UP (ref 1.7–9.3)
NEUTROPHILS # BLD AUTO: 3.74 K/UL — SIGNIFICANT CHANGE UP (ref 1.4–6.5)
NEUTROPHILS NFR BLD AUTO: 65 % — SIGNIFICANT CHANGE UP (ref 42.2–75.2)
NRBC # BLD: 0 /100 WBCS — SIGNIFICANT CHANGE UP (ref 0–0)
PLATELET # BLD AUTO: 316 K/UL — SIGNIFICANT CHANGE UP (ref 130–400)
PMV BLD: 10.5 FL — HIGH (ref 7.4–10.4)
POTASSIUM SERPL-MCNC: 5.3 MMOL/L — HIGH (ref 3.5–5)
POTASSIUM SERPL-SCNC: 5.3 MMOL/L — HIGH (ref 3.5–5)
PROT SERPL-MCNC: 6.7 G/DL — SIGNIFICANT CHANGE UP (ref 6–8)
RBC # BLD: 4.05 M/UL — LOW (ref 4.2–5.4)
RBC # FLD: 24.1 % — HIGH (ref 11.5–14.5)
SODIUM SERPL-SCNC: 140 MMOL/L — SIGNIFICANT CHANGE UP (ref 135–146)
WBC # BLD: 5.75 K/UL — SIGNIFICANT CHANGE UP (ref 4.8–10.8)
WBC # FLD AUTO: 5.75 K/UL — SIGNIFICANT CHANGE UP (ref 4.8–10.8)

## 2024-04-11 PROCEDURE — 36415 COLL VENOUS BLD VENIPUNCTURE: CPT

## 2024-04-11 PROCEDURE — 96374 THER/PROPH/DIAG INJ IV PUSH: CPT

## 2024-04-11 PROCEDURE — 96372 THER/PROPH/DIAG INJ SC/IM: CPT | Mod: XU

## 2024-04-11 PROCEDURE — 80177 DRUG SCRN QUAN LEVETIRACETAM: CPT

## 2024-04-11 PROCEDURE — 83735 ASSAY OF MAGNESIUM: CPT

## 2024-04-11 PROCEDURE — 95711 VEEG 2-12 HR UNMONITORED: CPT

## 2024-04-11 PROCEDURE — 80053 COMPREHEN METABOLIC PANEL: CPT

## 2024-04-11 PROCEDURE — 95700 EEG CONT REC W/VID EEG TECH: CPT

## 2024-04-11 PROCEDURE — 82962 GLUCOSE BLOOD TEST: CPT

## 2024-04-11 PROCEDURE — 99284 EMERGENCY DEPT VISIT MOD MDM: CPT | Mod: 25

## 2024-04-11 PROCEDURE — 96376 TX/PRO/DX INJ SAME DRUG ADON: CPT

## 2024-04-11 PROCEDURE — 95718 EEG PHYS/QHP 2-12 HR W/VEEG: CPT

## 2024-04-11 PROCEDURE — 99284 EMERGENCY DEPT VISIT MOD MDM: CPT

## 2024-04-11 PROCEDURE — 85025 COMPLETE CBC W/AUTO DIFF WBC: CPT

## 2024-04-11 RX ORDER — LEVETIRACETAM 250 MG/1
1000 TABLET, FILM COATED ORAL ONCE
Refills: 0 | Status: COMPLETED | OUTPATIENT
Start: 2024-04-11 | End: 2024-04-11

## 2024-04-11 RX ADMIN — LEVETIRACETAM 400 MILLIGRAM(S): 250 TABLET, FILM COATED ORAL at 18:02

## 2024-04-11 RX ADMIN — Medication 2 MILLIGRAM(S): at 17:30

## 2024-04-11 RX ADMIN — LEVETIRACETAM 400 MILLIGRAM(S): 250 TABLET, FILM COATED ORAL at 20:52

## 2024-04-11 NOTE — ED ADULT TRIAGE NOTE - CHIEF COMPLAINT QUOTE
Pt presents to the ED w/ c/o of seizure at assisted living facility. Pt is bedbound and nonverbal at baseline. Pt hd a witnessed seizure by staff for unknown length of time.

## 2024-04-11 NOTE — ED ADULT NURSE REASSESSMENT NOTE - NS ED NURSE REASSESS COMMENT FT1
Patient had an episode of seizures while waiting for transport . Transport is cancelled . keppra is given as ordered ,lorazepam administered IM

## 2024-04-11 NOTE — CONSULT NOTE ADULT - ASSESSMENT
Assessment and Plan  58 year old female, lives in [facility], pmhx of seizure (on Keppra 500mg BID), follows Dr. Rashid outpatient, Down syndrome, cerebral palsy, hypothyroidism, hypotension on midodrine, nonverbal and does not follow commands at baseline, with PEG tube (placed on 4/2/24), who presented to the ED with seizure 2 hours before arrival. Health aide at bedside provided the history. Per the health care aide, patient had a seizure this morning, tonic clonic in nature and similar to her prior seizure in October 2023, seen by neurology here. At baseline, pt non verbal with contracted extremities due to CP. Left gaze head preference, non fixed, contracted tone, does not BTT, reflexes intact. Aid states she is back to baseline with each seizure, at baseline right now.     RECS:  -Load with another 1g of keppra (2g load in total)  -Increase home dose of keppra from 500 mg BID to 750 BID.  -Stat VEEG, will follow read after 2 hours   -Will reassess after load and eeg.     Discussed with Dr. Damon

## 2024-04-11 NOTE — ED PROVIDER NOTE - ATTENDING CONTRIBUTION TO CARE
see mdm 58F w/ PMHx Down Syndrome, nonverbal at baseline, Hypothyroidism, Cerebral palsy and Seizure Disorders presents to the ED from nursing facility/group home ([facility]) presented to ED for seizure episode lasting <30 seconds, now back to baseline. Patient recently had long admission 1/2024 to 4/8/2024 for sepsis and respiratory distress. Patient was in her usual state of health when she had a witnessed seizure. Aid denies fevers, changes in behavior, changes in bowel movement. Patient opens eyes to verbal cues. Lungs clear to auscultation bilaterally. Heart RRR. Abdomen soft NDNT +PEG tube in place. Labs were ordered and reviewed.  Patient's records (prior hospital, ED visit, and/or nursing home notes if available) were reviewed.  Additional history was obtained from aid from facility.  Patient had a seizure while in ED which stopped on its own after several seconds. She then had another requiring ativan and then stopped. Neurology consulted and recommended vEEG, 2g keppra, and increase in home dose Keppra to 750 mg big. 58F w/ PMHx Down Syndrome, nonverbal at baseline, Hypothyroidism, Cerebral palsy and Seizure Disorders presents to the ED from nursing facility/group home ([facility]) presented to ED for seizure episode lasting <30 seconds, now back to baseline. Patient recently had long admission 1/2024 to 4/8/2024 for sepsis and respiratory distress. Patient was in her usual state of health when she had a witnessed seizure. Aid denies fevers, changes in behavior, changes in bowel movement. Patient opens eyes to verbal cues. Lungs clear to auscultation bilaterally. Heart RRR. Abdomen soft NDNT +PEG tube in place. Contracted extremities.  Labs were ordered and reviewed.  Patient's records (prior hospital, ED visit, and/or nursing home notes if available) were reviewed.  Additional history was obtained from aid from facility.  Patient had a seizure while in ED which stopped on its own after several seconds. She then had another requiring ativan and then stopped. Neurology consulted and recommended vEEG, 2g keppra, and increase in home dose Keppra to 750 mg big. 58F w/ PMHx Down Syndrome, nonverbal at baseline, Hypothyroidism, Cerebral palsy and Seizure Disorders presents to the ED from nursing facility/group home ([facility]) presented to ED for seizure episode lasting <30 seconds, now back to baseline. Patient recently had long admission 1/2024 to 4/8/2024 for sepsis and respiratory distress. Patient was in her usual state of health when she had a witnessed seizure. Aid denies fevers, changes in behavior, changes in bowel movement. Patient opens eyes to verbal cues. Lungs clear to auscultation bilaterally. Heart RRR. Abdomen soft NDNT +PEG tube in place. Contracted extremities at baseline. Minimally interactive, opens eyes.  Labs were ordered and reviewed.  Patient's records (prior hospital, ED visit, and/or nursing home notes if available) were reviewed.  Additional history was obtained from aid from facility.     Patient had a seizure while in ED which stopped on its own after several seconds. Returned to baseline.  She then had another requiring ativan which abated the seizure.  Has been at baseline since.  Neurology consulted and recommended vEEG, 2g keppra, and increase in home dose Keppra to 750 mg bid.     Neurology ordered STAT vEEG for 2 hours and they will follow up on read .  Pending dispo.

## 2024-04-11 NOTE — CONSULT NOTE ADULT - SUBJECTIVE AND OBJECTIVE BOX
Neurology Consult Note    HPI: 58 year old female, lives in [facility], pmhx of seizure (on Keppra 500mg BID), follows Dr. Rashdi outpatient, Down syndrome, cerebral palsy, hypothyroidism, hypotension on midodrine, nonverbal and does not follow commands at baseline, with PEG tube (placed on 4/2/24), who presented to the ED with seizure 2 hours before arrival. Health aide at bedside provided the history. Per the health care aide, patient had a seizure this morning, tonic clonic in nature and similar to her prior seizure in October 2023, seen by neurology here. At baseline, pt non verbal with contracted extremities due to CP. Left gaze head preference, non fixed. Aid states she is back to baseline with each seizure. Aid denies med noncompliance, recent illness.     PAST MEDICAL & SURGICAL HISTORY:  Down syndrome      Osteoporosis      Mild anemia      Neuropathy      S/P debridement  of R hip on 3/2/21          FAMILY HISTORY:      SOCIAL HISTORY:  Denies smoking, drinking, or drug use    ROS: as per HPI     MEDICATIONS  (STANDING):  levETIRAcetam  IVPB 1000 milliGRAM(s) IV Intermittent Once  LORazepam   Injectable 2 milliGRAM(s) IntraMuscular Once    MEDICATIONS  (PRN):      Allergies    No Known Allergies    Intolerances        Vital Signs Last 24 Hrs  T(C): 36.4 (11 Apr 2024 13:16), Max: 36.4 (11 Apr 2024 13:16)  T(F): 97.6 (11 Apr 2024 13:16), Max: 97.6 (11 Apr 2024 13:16)  HR: 87 (11 Apr 2024 13:16) (87 - 87)  BP: 97/54 (11 Apr 2024 13:16) (97/54 - 97/54)  BP(mean): --  RR: 16 (11 Apr 2024 13:16) (16 - 16)  SpO2: 100% (11 Apr 2024 13:16) (100% - 100%)    Parameters below as of 11 Apr 2024 13:16  Patient On (Oxygen Delivery Method): room air        Physical exam:  General: No acute distress, awake and alert, left non fixed head preference noted     Neurologic:  -Mental status: Nonverbal, does not follow commands.   -Cranial nerves:   III, IV, VI: Pupils equally round and reactive to light, does not BTT.   VII: left head preference noted, non fixed.   Motor: Normal bulk contracted and rigid tone.   Reflexes: DTRs intact       LABS:                        11.3   5.75  )-----------( 316      ( 11 Apr 2024 14:23 )             35.9     04-11    140  |  101  |  16  ----------------------------<  114<H>  5.3<H>   |  26  |  0.6<L>    Ca    9.2      11 Apr 2024 14:23  Mg     2.3     04-11    TPro  6.7  /  Alb  3.2<L>  /  TBili  <0.2  /  DBili  x   /  AST  50<H>  /  ALT  21  /  AlkPhos  97  04-11      Urinalysis Basic - ( 11 Apr 2024 14:23 )    Color: x / Appearance: x / SG: x / pH: x  Gluc: 114 mg/dL / Ketone: x  / Bili: x / Urobili: x   Blood: x / Protein: x / Nitrite: x   Leuk Esterase: x / RBC: x / WBC x   Sq Epi: x / Non Sq Epi: x / Bacteria: x        RADIOLOGY & ADDITIONAL TESTS:

## 2024-04-11 NOTE — ED PROVIDER NOTE - NEUROLOGICAL LEVEL OF CONSCIOUSNESS
Does not follow commands. Tries to close eyes for eye exam and was difficult to examine for pupillary response.

## 2024-04-11 NOTE — ED PROVIDER NOTE - CLINICAL SUMMARY MEDICAL DECISION MAKING FREE TEXT BOX
58F w/ PMHx Down Syndrome, nonverbal at baseline, Hypothyroidism, Cerebral palsy and Seizure Disorders presents to the ED from nursing facility/group home ([facility]) presented to ED for seizure episode lasting <30 seconds, now back to baseline. Patient recently had long admission 1/2024 to 4/8/2024 for sepsis and respiratory distress. Patient was in her usual state of health when she had a witnessed seizure. Aid denies fevers, changes in behavior, changes in bowel movement. Patient opens eyes to verbal cues. Lungs clear to auscultation bilaterally. Heart RRR. Abdomen soft NDNT +PEG tube in place. Labs were ordered and reviewed.  Patient's records (prior hospital, ED visit, and/or nursing home notes if available) were reviewed.  Additional history was obtained from aid from facility.  Escalation to admission/observation was considered.  1) However patient feels much better and is comfortable with discharge.  Appropriate follow-up was arranged. 2) Patient requires inpatient hospitalization - monitored setting. 58F w/ PMHx Down Syndrome, nonverbal at baseline, Hypothyroidism, Cerebral palsy and Seizure Disorders presents to the ED from nursing facility/group home ([facility]) presented to ED for seizure episode lasting <30 seconds, now back to baseline. Patient recently had long admission 1/2024 to 4/8/2024 for sepsis and respiratory distress. Patient was in her usual state of health when she had a witnessed seizure. Aid denies fevers, changes in behavior, changes in bowel movement. Patient opens eyes to verbal cues. Lungs clear to auscultation bilaterally. Heart RRR. Abdomen soft NDNT +PEG tube in place. Labs were ordered and reviewed.  Patient's records (prior hospital, ED visit, and/or nursing home notes if available) were reviewed.  Additional history was obtained from aid from facility.  Discussed all results with aid at bedside. Patient continues to act per baseline. Discussed return precautions and follow up outpatient with neurology. 58F w/ PMHx Down Syndrome, nonverbal at baseline, Hypothyroidism, Cerebral palsy and Seizure Disorders presents to the ED from nursing facility/group home ([facility]) presented to ED for seizure episode lasting <30 seconds, now back to baseline. Patient recently had long admission 1/2024 to 4/8/2024 for sepsis and respiratory distress. Patient was in her usual state of health when she had a witnessed seizure. Aid denies fevers, changes in behavior, changes in bowel movement. Patient opens eyes to verbal cues. Lungs clear to auscultation bilaterally. Heart RRR. Abdomen soft NDNT +PEG tube in place. Labs were ordered and reviewed.  Patient's records (prior hospital, ED visit, and/or nursing home notes if available) were reviewed.  Additional history was obtained from aid from facility.  Discussed all results with aid at bedside. Patient continues to act per baseline. Ana Maria: Sign out received from Dr. Rizvi. Pt was brought in after having a seizure, has hx of epilepsy. Pt had 2 more seizures in the ED that terminated with ativan. Neurology consulted and they wanted to obtain 2 hr rapid EEG and either admit or dc based results. I teams chatted Dr. Suraj Mix, the neuro resident on call regarding the eeg results and he states that he got in contact with the covering epileptologist who reviewed the eeg. Based on their findings, they recommended that pt can be dced with increased in dosage of Keppra from 500mg BID to 750 mg BID. I communicated this with HHA at bedside, I spoke with supervisor Mami [phone] at pt's group home and nurse Judah regarding pt's disposition and medication changes. Will dc.

## 2024-04-11 NOTE — ED PROVIDER NOTE - OBJECTIVE STATEMENT
Ms. Linton is a 58 year old female, lives in [facility], health care proxy is [employer] ([phone]), pmhx of seizure (on Keppra 500mg BID) Down syndrome, cerebral palsy, hypothyroidism, hypotension on midodrine, nonverbal and does not follow commands at baseline, with PEG tube (placed on 4/2/24), who presented to the ED with seizure like activity. Health aide at bedside provided the history. Per the health care aide, patient had a seizure like activity this morning, in which she could not breath, face became "tight" and her extremities were contracted for about 30 seconds.     Of note, patient had an extensive admission recently from 1/20/24 to 4/8/24 for sepsis 2/2 aspiration pneumonia and bacteremia.

## 2024-04-11 NOTE — ED PROVIDER NOTE - PATIENT PORTAL LINK FT
You can access the FollowMyHealth Patient Portal offered by [facility] by registering at the following website: [email or web address]. By joining [employer]’s FollowMyHealth portal, you will also be able to view your health information using other applications (apps) compatible with our system. You can access the FollowMyHealth Patient Portal offered by [facility] by registering at the following website: [email or web address]. By joining [employer]’s FollowMyHealth portal, you will also be able to view your health information using other applications (apps) compatible with our system.

## 2024-04-11 NOTE — ED ADULT NURSE REASSESSMENT NOTE - NS ED NURSE REASSESS COMMENT FT1
received pt from previous RN @2300, pt assessed, pt nonverbal, pt switched from nonrebreather to 2 NC  and been maintaining O2 96%-99%  V/S stable at this time   pt on cardiac monitoring

## 2024-04-11 NOTE — ED PROVIDER NOTE - CARE PROVIDER_API CALL
Eugenio Rashid  Neurology  [address] [place] [address], [address]  [place] [address]  Phone: [phone]  Fax: [phone]  Follow Up Time: 1-3 Days

## 2024-04-11 NOTE — ED PROVIDER NOTE - NSTIMEPROVIDERCAREINITIATE_GEN_ER
NP Note discussed with  Primary Attending    Patient is a 64y old  Female who presents with a chief complaint of vomiting (26 Sep 2018 18:41)      INTERVAL HPI/OVERNIGHT EVENTS: Creatinine continues to increase    MEDICATIONS  (STANDING):  ALBUTerol/ipratropium for Nebulization 3 milliLiter(s) Nebulizer every 6 hours  amLODIPine   Tablet 2.5 milliGRAM(s) Oral daily  atorvastatin 20 milliGRAM(s) Oral at bedtime  benztropine 2 milliGRAM(s) Oral two times a day  chlorhexidine 4% Liquid 1 Application(s) Topical every 12 hours  heparin  Injectable 5000 Unit(s) SubCutaneous every 8 hours  insulin glargine Injectable (LANTUS) 25 Unit(s) SubCutaneous at bedtime  insulin lispro (HumaLOG) corrective regimen sliding scale   SubCutaneous every 6 hours  insulin lispro Injectable (HumaLOG) 10 Unit(s) SubCutaneous <User Schedule>  lactobacillus acidophilus 1 Tablet(s) Oral every 8 hours  levETIRAcetam  Solution 1000 milliGRAM(s) Oral two times a day  metoprolol tartrate 100 milliGRAM(s) Oral two times a day  pantoprazole   Suspension 40 milliGRAM(s) Oral daily  QUEtiapine 50 milliGRAM(s) Oral every 12 hours  senna 2 Tablet(s) Oral at bedtime  sodium chloride 0.9%. 1000 milliLiter(s) (50 mL/Hr) IV Continuous <Continuous>    MEDICATIONS  (PRN):  haloperidol    Injectable 5 milliGRAM(s) IntraMuscular once PRN Agitation  LORazepam   Injectable 1 milliGRAM(s) IV Push every 8 hours PRN for breathrough agitation/anxiety/combative behavior      __________________________________________________  REVIEW OF SYSTEMS: Unavailable to obtain pt aphasic    Vital Signs Last 24 Hrs  T(C): 36.6 (27 Sep 2018 05:25), Max: 36.7 (26 Sep 2018 20:45)  T(F): 97.8 (27 Sep 2018 05:25), Max: 98 (26 Sep 2018 20:45)  HR: 91 (27 Sep 2018 05:25) (89 - 91)  BP: 124/61 (27 Sep 2018 05:25) (115/71 - 124/61)  BP(mean): --  RR: 18 (27 Sep 2018 05:25) (18 - 18)  SpO2: 98% (27 Sep 2018 05:25) (98% - 98%)    ________________________________________________  PHYSICAL EXAM:  GENERAL: NAD  HEENT: Normocephalic;  conjunctivae and sclerae clear; moist mucous membranes;   NECK : supple, tracheostomy intact  CHEST/LUNG: Diminished breath sounds R>L, scattered rhonchi  HEART: S1 S2  regular; no murmurs, gallops or rubs  ABDOMEN: Soft, Nontender, Nondistended; Bowel sounds present; Peg intact  EXTREMITIES: no cyanosis; no edema;   SKIN: warm and dry; no rash  NERVOUS SYSTEM:  Awake and alert;  no new deficits    _________________________________________________  LABS:                        9.3    8.5   )-----------( 238      ( 27 Sep 2018 08:21 )             29.6     09-27    130<L>  |  97  |  25<H>  ----------------------------<  269<H>  3.9   |  24  |  2.80<H>    Ca    9.3      27 Sep 2018 08:21          CAPILLARY BLOOD GLUCOSE      POCT Blood Glucose.: 174 mg/dL (27 Sep 2018 05:45)  POCT Blood Glucose.: 194 mg/dL (26 Sep 2018 23:56)  POCT Blood Glucose.: 257 mg/dL (26 Sep 2018 17:39)  POCT Blood Glucose.: 193 mg/dL (26 Sep 2018 11:36)        RADIOLOGY & ADDITIONAL TESTS:    Imaging Personally Reviewed:  YES  < from: Xray Chest 1 View- PORTABLE-Routine (09.24.18 @ 08:49) >  Frontal expiratory view of the chest shows the heart to be similar in   size. Tracheostomy tube is again noted. The lungs show clearing of the   right lung with some residual right base infiltrate or atelectasis and   there is no evidence of pneumothorax nor pleural effusion.      < end of copied text >      Consultant(s) Notes Reviewed:   YES    Care Discussed with Consultants :     Plan of care was discussed with patient and /or primary care giver; all questions and concerns were addressed and care was aligned with patient's wishes. 11-Apr-2024 13:30

## 2024-04-11 NOTE — ED PROVIDER NOTE - NS ED ROS FT
Patient was unable to provide ROS as she is at baseline nonverbal.    The health aide noted that she has been having bowel movement and voiding without difficulty. No fever noted.

## 2024-04-12 VITALS
DIASTOLIC BLOOD PRESSURE: 78 MMHG | RESPIRATION RATE: 18 BRPM | HEART RATE: 69 BPM | SYSTOLIC BLOOD PRESSURE: 126 MMHG | TEMPERATURE: 98 F | OXYGEN SATURATION: 97 %

## 2024-04-12 RX ORDER — SODIUM CHLORIDE 9 MG/ML
500 INJECTION, SOLUTION INTRAVENOUS
Refills: 0 | Status: DISCONTINUED | OUTPATIENT
Start: 2024-04-12 | End: 2024-04-12

## 2024-04-12 RX ORDER — SODIUM CHLORIDE 9 MG/ML
500 INJECTION, SOLUTION INTRAVENOUS ONCE
Refills: 0 | Status: COMPLETED | OUTPATIENT
Start: 2024-04-12 | End: 2024-04-12

## 2024-04-12 RX ORDER — LEVETIRACETAM 250 MG/1
1 TABLET, FILM COATED ORAL
Qty: 42 | Refills: 0
Start: 2024-04-12 | End: 2024-05-02

## 2024-04-12 RX ORDER — LEVETIRACETAM 250 MG/1
7.5 TABLET, FILM COATED ORAL
Qty: 450 | Refills: 0
Start: 2024-04-12 | End: 2024-05-11

## 2024-04-12 RX ORDER — MIDODRINE HYDROCHLORIDE 2.5 MG/1
10 TABLET ORAL ONCE
Refills: 0 | Status: COMPLETED | OUTPATIENT
Start: 2024-04-12 | End: 2024-04-12

## 2024-04-12 RX ADMIN — SODIUM CHLORIDE 500 MILLILITER(S): 9 INJECTION, SOLUTION INTRAVENOUS at 02:38

## 2024-04-12 RX ADMIN — SODIUM CHLORIDE 500 MILLILITER(S): 9 INJECTION, SOLUTION INTRAVENOUS at 03:36

## 2024-04-12 RX ADMIN — MIDODRINE HYDROCHLORIDE 10 MILLIGRAM(S): 2.5 TABLET ORAL at 03:36

## 2024-04-12 NOTE — ED POST DISCHARGE NOTE - RESULT SUMMARY
MD CALLED FROM [facility], PATIENT WITH G-TUBE, NEEDS KEPPRA LIQUID, PLUS DOC SAY PHARM NEVER GOT FIRST ORDER. PER NEUROLOGIST NOTE: KEPPRA 100MG/ML- 7.5 ML BID X 30 DAYS SENT.

## 2024-04-12 NOTE — ED ADULT NURSE REASSESSMENT NOTE - NS ED NURSE REASSESS COMMENT FT1
Pt noted to be hypotensive. MD Jay made aware. pt responded to fluids. verbal order was obtained for 500cc LR  from MD Jay.   pt on cardiac monitoring   Awaiting for new orders

## 2024-04-14 DIAGNOSIS — Z86.19 PERSONAL HISTORY OF OTHER INFECTIOUS AND PARASITIC DISEASES: ICD-10-CM

## 2024-04-14 DIAGNOSIS — R62.7 ADULT FAILURE TO THRIVE: ICD-10-CM

## 2024-04-14 DIAGNOSIS — R65.21 SEVERE SEPSIS WITH SEPTIC SHOCK: ICD-10-CM

## 2024-04-14 DIAGNOSIS — M81.0 AGE-RELATED OSTEOPOROSIS WITHOUT CURRENT PATHOLOGICAL FRACTURE: ICD-10-CM

## 2024-04-14 DIAGNOSIS — L89.610 PRESSURE ULCER OF RIGHT HEEL, UNSTAGEABLE: ICD-10-CM

## 2024-04-14 DIAGNOSIS — Z79.01 LONG TERM (CURRENT) USE OF ANTICOAGULANTS: ICD-10-CM

## 2024-04-14 DIAGNOSIS — Z11.52 ENCOUNTER FOR SCREENING FOR COVID-19: ICD-10-CM

## 2024-04-14 DIAGNOSIS — B95.7 OTHER STAPHYLOCOCCUS AS THE CAUSE OF DISEASES CLASSIFIED ELSEWHERE: ICD-10-CM

## 2024-04-14 DIAGNOSIS — K26.9 DUODENAL ULCER, UNSPECIFIED AS ACUTE OR CHRONIC, WITHOUT HEMORRHAGE OR PERFORATION: ICD-10-CM

## 2024-04-14 DIAGNOSIS — Z66 DO NOT RESUSCITATE: ICD-10-CM

## 2024-04-14 DIAGNOSIS — I95.9 HYPOTENSION, UNSPECIFIED: ICD-10-CM

## 2024-04-14 DIAGNOSIS — J21.0 ACUTE BRONCHIOLITIS DUE TO RESPIRATORY SYNCYTIAL VIRUS: ICD-10-CM

## 2024-04-14 DIAGNOSIS — E03.9 HYPOTHYROIDISM, UNSPECIFIED: ICD-10-CM

## 2024-04-14 DIAGNOSIS — E66.9 OBESITY, UNSPECIFIED: ICD-10-CM

## 2024-04-14 DIAGNOSIS — H10.9 UNSPECIFIED CONJUNCTIVITIS: ICD-10-CM

## 2024-04-14 DIAGNOSIS — G40.909 EPILEPSY, UNSPECIFIED, NOT INTRACTABLE, WITHOUT STATUS EPILEPTICUS: ICD-10-CM

## 2024-04-14 DIAGNOSIS — Z89.411 ACQUIRED ABSENCE OF RIGHT GREAT TOE: ICD-10-CM

## 2024-04-14 DIAGNOSIS — E87.6 HYPOKALEMIA: ICD-10-CM

## 2024-04-14 DIAGNOSIS — Z79.899 OTHER LONG TERM (CURRENT) DRUG THERAPY: ICD-10-CM

## 2024-04-14 DIAGNOSIS — E87.0 HYPEROSMOLALITY AND HYPERNATREMIA: ICD-10-CM

## 2024-04-14 DIAGNOSIS — N18.30 CHRONIC KIDNEY DISEASE, STAGE 3 UNSPECIFIED: ICD-10-CM

## 2024-04-14 DIAGNOSIS — Z86.718 PERSONAL HISTORY OF OTHER VENOUS THROMBOSIS AND EMBOLISM: ICD-10-CM

## 2024-04-14 DIAGNOSIS — Q90.9 DOWN SYNDROME, UNSPECIFIED: ICD-10-CM

## 2024-04-14 DIAGNOSIS — F79 UNSPECIFIED INTELLECTUAL DISABILITIES: ICD-10-CM

## 2024-04-14 DIAGNOSIS — A41.9 SEPSIS, UNSPECIFIED ORGANISM: ICD-10-CM

## 2024-04-14 DIAGNOSIS — R00.0 TACHYCARDIA, UNSPECIFIED: ICD-10-CM

## 2024-04-14 DIAGNOSIS — L89.620 PRESSURE ULCER OF LEFT HEEL, UNSTAGEABLE: ICD-10-CM

## 2024-04-14 DIAGNOSIS — J69.0 PNEUMONITIS DUE TO INHALATION OF FOOD AND VOMIT: ICD-10-CM

## 2024-04-14 DIAGNOSIS — J85.1 ABSCESS OF LUNG WITH PNEUMONIA: ICD-10-CM

## 2024-04-14 DIAGNOSIS — E87.5 HYPERKALEMIA: ICD-10-CM

## 2024-04-14 DIAGNOSIS — E87.20 ACIDOSIS, UNSPECIFIED: ICD-10-CM

## 2024-04-14 DIAGNOSIS — J15.69 PNEUMONIA DUE TO OTHER GRAM-NEGATIVE BACTERIA: ICD-10-CM

## 2024-04-14 DIAGNOSIS — Z89.421 ACQUIRED ABSENCE OF OTHER RIGHT TOE(S): ICD-10-CM

## 2024-04-14 DIAGNOSIS — D72.829 ELEVATED WHITE BLOOD CELL COUNT, UNSPECIFIED: ICD-10-CM

## 2024-04-14 DIAGNOSIS — D63.1 ANEMIA IN CHRONIC KIDNEY DISEASE: ICD-10-CM

## 2024-04-14 DIAGNOSIS — I21.A1 MYOCARDIAL INFARCTION TYPE 2: ICD-10-CM

## 2024-04-14 DIAGNOSIS — L89.322 PRESSURE ULCER OF LEFT BUTTOCK, STAGE 2: ICD-10-CM

## 2024-04-14 DIAGNOSIS — R13.12 DYSPHAGIA, OROPHARYNGEAL PHASE: ICD-10-CM

## 2024-04-14 DIAGNOSIS — J96.01 ACUTE RESPIRATORY FAILURE WITH HYPOXIA: ICD-10-CM

## 2024-04-14 DIAGNOSIS — L89.312 PRESSURE ULCER OF RIGHT BUTTOCK, STAGE 2: ICD-10-CM

## 2024-04-14 DIAGNOSIS — G80.9 CEREBRAL PALSY, UNSPECIFIED: ICD-10-CM

## 2024-04-14 DIAGNOSIS — E87.3 ALKALOSIS: ICD-10-CM

## 2024-04-14 DIAGNOSIS — Z78.1 PHYSICAL RESTRAINT STATUS: ICD-10-CM

## 2024-04-15 ENCOUNTER — INPATIENT (INPATIENT)
Facility: HOSPITAL | Age: 59
LOS: 43 days | Discharge: NURSING, REHAB OR LONG-TERM CARE | DRG: 872 | End: 2024-05-29
Attending: INTERNAL MEDICINE | Admitting: STUDENT IN AN ORGANIZED HEALTH CARE EDUCATION/TRAINING PROGRAM
Payer: COMMERCIAL

## 2024-04-15 VITALS
DIASTOLIC BLOOD PRESSURE: 55 MMHG | TEMPERATURE: 103 F | HEART RATE: 136 BPM | SYSTOLIC BLOOD PRESSURE: 74 MMHG | HEIGHT: 55 IN | RESPIRATION RATE: 22 BRPM | OXYGEN SATURATION: 84 %

## 2024-04-15 DIAGNOSIS — Z98.890 OTHER SPECIFIED POSTPROCEDURAL STATES: Chronic | ICD-10-CM

## 2024-04-15 DIAGNOSIS — A41.9 SEPSIS, UNSPECIFIED ORGANISM: ICD-10-CM

## 2024-04-15 LAB
ALBUMIN SERPL ELPH-MCNC: 3.2 G/DL — LOW (ref 3.5–5.2)
ALP SERPL-CCNC: 106 U/L — SIGNIFICANT CHANGE UP (ref 30–115)
ALT FLD-CCNC: 14 U/L — SIGNIFICANT CHANGE UP (ref 0–41)
ANION GAP SERPL CALC-SCNC: 14 MMOL/L — SIGNIFICANT CHANGE UP (ref 7–14)
ANISOCYTOSIS BLD QL: SLIGHT — SIGNIFICANT CHANGE UP
APTT BLD: 29.9 SEC — SIGNIFICANT CHANGE UP (ref 27–39.2)
AST SERPL-CCNC: 25 U/L — SIGNIFICANT CHANGE UP (ref 0–41)
BASE EXCESS BLDV CALC-SCNC: 3.3 MMOL/L — HIGH (ref -2–3)
BASOPHILS # BLD AUTO: 0 K/UL — SIGNIFICANT CHANGE UP (ref 0–0.2)
BASOPHILS NFR BLD AUTO: 0 % — SIGNIFICANT CHANGE UP (ref 0–1)
BILIRUB SERPL-MCNC: 0.5 MG/DL — SIGNIFICANT CHANGE UP (ref 0.2–1.2)
BUN SERPL-MCNC: 12 MG/DL — SIGNIFICANT CHANGE UP (ref 10–20)
CA-I SERPL-SCNC: 1.23 MMOL/L — SIGNIFICANT CHANGE UP (ref 1.15–1.33)
CALCIUM SERPL-MCNC: 9 MG/DL — SIGNIFICANT CHANGE UP (ref 8.4–10.5)
CHLORIDE SERPL-SCNC: 98 MMOL/L — SIGNIFICANT CHANGE UP (ref 98–110)
CO2 SERPL-SCNC: 26 MMOL/L — SIGNIFICANT CHANGE UP (ref 17–32)
CREAT SERPL-MCNC: 0.7 MG/DL — SIGNIFICANT CHANGE UP (ref 0.7–1.5)
EGFR: 101 ML/MIN/1.73M2 — SIGNIFICANT CHANGE UP
EOSINOPHIL # BLD AUTO: 0 K/UL — SIGNIFICANT CHANGE UP (ref 0–0.7)
EOSINOPHIL NFR BLD AUTO: 0 % — SIGNIFICANT CHANGE UP (ref 0–8)
FLUAV AG NPH QL: SIGNIFICANT CHANGE UP
FLUBV AG NPH QL: SIGNIFICANT CHANGE UP
GAS PNL BLDV: 135 MMOL/L — LOW (ref 136–145)
GAS PNL BLDV: SIGNIFICANT CHANGE UP
GIANT PLATELETS BLD QL SMEAR: PRESENT — SIGNIFICANT CHANGE UP
GLUCOSE SERPL-MCNC: 144 MG/DL — HIGH (ref 70–99)
HCO3 BLDV-SCNC: 28 MMOL/L — SIGNIFICANT CHANGE UP (ref 22–29)
HCT VFR BLD CALC: 35.5 % — LOW (ref 37–47)
HCT VFR BLDA CALC: 34 % — LOW (ref 34.5–46.5)
HGB BLD CALC-MCNC: 11.3 G/DL — LOW (ref 11.7–16.1)
HGB BLD-MCNC: 11 G/DL — LOW (ref 12–16)
INR BLD: 1.21 RATIO — SIGNIFICANT CHANGE UP (ref 0.65–1.3)
LACTATE BLDV-MCNC: 1.7 MMOL/L — SIGNIFICANT CHANGE UP (ref 0.5–2)
LEVETIRACETAM SERPL-MCNC: 19.3 UG/ML — SIGNIFICANT CHANGE UP (ref 10–40)
LYMPHOCYTES # BLD AUTO: 1.41 K/UL — SIGNIFICANT CHANGE UP (ref 1.2–3.4)
LYMPHOCYTES # BLD AUTO: 11.5 % — LOW (ref 20.5–51.1)
MANUAL SMEAR VERIFICATION: SIGNIFICANT CHANGE UP
MCHC RBC-ENTMCNC: 27.8 PG — SIGNIFICANT CHANGE UP (ref 27–31)
MCHC RBC-ENTMCNC: 31 G/DL — LOW (ref 32–37)
MCV RBC AUTO: 89.9 FL — SIGNIFICANT CHANGE UP (ref 81–99)
MICROCYTES BLD QL: SLIGHT — SIGNIFICANT CHANGE UP
MONOCYTES # BLD AUTO: 0.54 K/UL — SIGNIFICANT CHANGE UP (ref 0.1–0.6)
MONOCYTES NFR BLD AUTO: 4.4 % — SIGNIFICANT CHANGE UP (ref 1.7–9.3)
NEUTROPHILS # BLD AUTO: 10.3 K/UL — HIGH (ref 1.4–6.5)
NEUTROPHILS NFR BLD AUTO: 80.5 % — HIGH (ref 42.2–75.2)
NEUTS BAND # BLD: 3.6 % — SIGNIFICANT CHANGE UP (ref 0–6)
PCO2 BLDV: 45 MMHG — HIGH (ref 39–42)
PH BLDV: 7.41 — SIGNIFICANT CHANGE UP (ref 7.32–7.43)
PLAT MORPH BLD: ABNORMAL
PLATELET # BLD AUTO: 341 K/UL — SIGNIFICANT CHANGE UP (ref 130–400)
PLATELET CLUMP BLD QL SMEAR: SLIGHT
PMV BLD: 10.5 FL — HIGH (ref 7.4–10.4)
PO2 BLDV: 42 MMHG — SIGNIFICANT CHANGE UP (ref 25–45)
POIKILOCYTOSIS BLD QL AUTO: SLIGHT — SIGNIFICANT CHANGE UP
POLYCHROMASIA BLD QL SMEAR: SLIGHT — SIGNIFICANT CHANGE UP
POTASSIUM BLDV-SCNC: 4.2 MMOL/L — SIGNIFICANT CHANGE UP (ref 3.5–5.1)
POTASSIUM SERPL-MCNC: 4.6 MMOL/L — SIGNIFICANT CHANGE UP (ref 3.5–5)
POTASSIUM SERPL-SCNC: 4.6 MMOL/L — SIGNIFICANT CHANGE UP (ref 3.5–5)
PROT SERPL-MCNC: 6.6 G/DL — SIGNIFICANT CHANGE UP (ref 6–8)
PROTHROM AB SERPL-ACNC: 13.8 SEC — HIGH (ref 9.95–12.87)
RBC # BLD: 3.95 M/UL — LOW (ref 4.2–5.4)
RBC # FLD: 23 % — HIGH (ref 11.5–14.5)
RBC BLD AUTO: ABNORMAL
RSV RNA NPH QL NAA+NON-PROBE: SIGNIFICANT CHANGE UP
SAO2 % BLDV: 69.5 % — SIGNIFICANT CHANGE UP (ref 67–88)
SARS-COV-2 RNA SPEC QL NAA+PROBE: SIGNIFICANT CHANGE UP
SODIUM SERPL-SCNC: 138 MMOL/L — SIGNIFICANT CHANGE UP (ref 135–146)
TARGETS BLD QL SMEAR: SLIGHT — SIGNIFICANT CHANGE UP
WBC # BLD: 12.25 K/UL — HIGH (ref 4.8–10.8)
WBC # FLD AUTO: 12.25 K/UL — HIGH (ref 4.8–10.8)

## 2024-04-15 PROCEDURE — 85652 RBC SED RATE AUTOMATED: CPT

## 2024-04-15 PROCEDURE — 71275 CT ANGIOGRAPHY CHEST: CPT | Mod: MC

## 2024-04-15 PROCEDURE — 85025 COMPLETE CBC W/AUTO DIFF WBC: CPT

## 2024-04-15 PROCEDURE — 86850 RBC ANTIBODY SCREEN: CPT

## 2024-04-15 PROCEDURE — 80048 BASIC METABOLIC PNL TOTAL CA: CPT

## 2024-04-15 PROCEDURE — 81001 URINALYSIS AUTO W/SCOPE: CPT

## 2024-04-15 PROCEDURE — 87150 DNA/RNA AMPLIFIED PROBE: CPT

## 2024-04-15 PROCEDURE — 93005 ELECTROCARDIOGRAM TRACING: CPT

## 2024-04-15 PROCEDURE — 83735 ASSAY OF MAGNESIUM: CPT

## 2024-04-15 PROCEDURE — 85027 COMPLETE CBC AUTOMATED: CPT

## 2024-04-15 PROCEDURE — 83605 ASSAY OF LACTIC ACID: CPT

## 2024-04-15 PROCEDURE — 87641 MR-STAPH DNA AMP PROBE: CPT

## 2024-04-15 PROCEDURE — A9541: CPT

## 2024-04-15 PROCEDURE — 87086 URINE CULTURE/COLONY COUNT: CPT

## 2024-04-15 PROCEDURE — 93307 TTE W/O DOPPLER COMPLETE: CPT

## 2024-04-15 PROCEDURE — 86900 BLOOD TYPING SEROLOGIC ABO: CPT

## 2024-04-15 PROCEDURE — 87305 ASPERGILLUS AG IA: CPT

## 2024-04-15 PROCEDURE — 87449 NOS EACH ORGANISM AG IA: CPT

## 2024-04-15 PROCEDURE — 87077 CULTURE AEROBIC IDENTIFY: CPT

## 2024-04-15 PROCEDURE — 86140 C-REACTIVE PROTEIN: CPT

## 2024-04-15 PROCEDURE — 78264 GASTRIC EMPTYING IMG STUDY: CPT | Mod: MC

## 2024-04-15 PROCEDURE — 74177 CT ABD & PELVIS W/CONTRAST: CPT | Mod: MC

## 2024-04-15 PROCEDURE — 36415 COLL VENOUS BLD VENIPUNCTURE: CPT

## 2024-04-15 PROCEDURE — 71045 X-RAY EXAM CHEST 1 VIEW: CPT | Mod: 26

## 2024-04-15 PROCEDURE — 71045 X-RAY EXAM CHEST 1 VIEW: CPT

## 2024-04-15 PROCEDURE — 84100 ASSAY OF PHOSPHORUS: CPT

## 2024-04-15 PROCEDURE — 87186 SC STD MICRODIL/AGAR DIL: CPT

## 2024-04-15 PROCEDURE — 85730 THROMBOPLASTIN TIME PARTIAL: CPT

## 2024-04-15 PROCEDURE — 74018 RADEX ABDOMEN 1 VIEW: CPT

## 2024-04-15 PROCEDURE — 71260 CT THORAX DX C+: CPT | Mod: MC

## 2024-04-15 PROCEDURE — 99291 CRITICAL CARE FIRST HOUR: CPT

## 2024-04-15 PROCEDURE — 87040 BLOOD CULTURE FOR BACTERIA: CPT

## 2024-04-15 PROCEDURE — 76705 ECHO EXAM OF ABDOMEN: CPT

## 2024-04-15 PROCEDURE — 94640 AIRWAY INHALATION TREATMENT: CPT

## 2024-04-15 PROCEDURE — 85610 PROTHROMBIN TIME: CPT

## 2024-04-15 PROCEDURE — 0225U NFCT DS DNA&RNA 21 SARSCOV2: CPT

## 2024-04-15 PROCEDURE — 84145 PROCALCITONIN (PCT): CPT

## 2024-04-15 PROCEDURE — 80053 COMPREHEN METABOLIC PANEL: CPT

## 2024-04-15 PROCEDURE — 86901 BLOOD TYPING SEROLOGIC RH(D): CPT

## 2024-04-15 PROCEDURE — 82550 ASSAY OF CK (CPK): CPT

## 2024-04-15 PROCEDURE — 93010 ELECTROCARDIOGRAM REPORT: CPT

## 2024-04-15 PROCEDURE — 93970 EXTREMITY STUDY: CPT

## 2024-04-15 PROCEDURE — 83036 HEMOGLOBIN GLYCOSYLATED A1C: CPT

## 2024-04-15 PROCEDURE — 94760 N-INVAS EAR/PLS OXIMETRY 1: CPT

## 2024-04-15 PROCEDURE — 82962 GLUCOSE BLOOD TEST: CPT

## 2024-04-15 PROCEDURE — 87640 STAPH A DNA AMP PROBE: CPT

## 2024-04-15 PROCEDURE — 84443 ASSAY THYROID STIM HORMONE: CPT

## 2024-04-15 RX ORDER — ACETAMINOPHEN 500 MG
650 TABLET ORAL ONCE
Refills: 0 | Status: COMPLETED | OUTPATIENT
Start: 2024-04-15 | End: 2024-04-15

## 2024-04-15 RX ORDER — VANCOMYCIN HCL 1 G
750 VIAL (EA) INTRAVENOUS EVERY 12 HOURS
Refills: 0 | Status: DISCONTINUED | OUTPATIENT
Start: 2024-04-16 | End: 2024-04-16

## 2024-04-15 RX ORDER — CEFEPIME 1 G/1
2000 INJECTION, POWDER, FOR SOLUTION INTRAMUSCULAR; INTRAVENOUS EVERY 8 HOURS
Refills: 0 | Status: DISCONTINUED | OUTPATIENT
Start: 2024-04-15 | End: 2024-04-16

## 2024-04-15 RX ORDER — VANCOMYCIN HCL 1 G
VIAL (EA) INTRAVENOUS
Refills: 0 | Status: DISCONTINUED | OUTPATIENT
Start: 2024-04-15 | End: 2024-04-16

## 2024-04-15 RX ORDER — ACETAMINOPHEN 500 MG
325 TABLET ORAL ONCE
Refills: 0 | Status: COMPLETED | OUTPATIENT
Start: 2024-04-15 | End: 2024-04-15

## 2024-04-15 RX ORDER — CEFEPIME 1 G/1
INJECTION, POWDER, FOR SOLUTION INTRAMUSCULAR; INTRAVENOUS
Refills: 0 | Status: DISCONTINUED | OUTPATIENT
Start: 2024-04-15 | End: 2024-04-16

## 2024-04-15 RX ORDER — CEFEPIME 1 G/1
2000 INJECTION, POWDER, FOR SOLUTION INTRAMUSCULAR; INTRAVENOUS ONCE
Refills: 0 | Status: COMPLETED | OUTPATIENT
Start: 2024-04-15 | End: 2024-04-15

## 2024-04-15 RX ORDER — SODIUM CHLORIDE 9 MG/ML
500 INJECTION, SOLUTION INTRAVENOUS ONCE
Refills: 0 | Status: COMPLETED | OUTPATIENT
Start: 2024-04-15 | End: 2024-04-15

## 2024-04-15 RX ORDER — SODIUM CHLORIDE 9 MG/ML
1300 INJECTION, SOLUTION INTRAVENOUS ONCE
Refills: 0 | Status: COMPLETED | OUTPATIENT
Start: 2024-04-15 | End: 2024-04-15

## 2024-04-15 RX ORDER — VANCOMYCIN HCL 1 G
750 VIAL (EA) INTRAVENOUS ONCE
Refills: 0 | Status: COMPLETED | OUTPATIENT
Start: 2024-04-15 | End: 2024-04-15

## 2024-04-15 RX ADMIN — CEFEPIME 100 MILLIGRAM(S): 1 INJECTION, POWDER, FOR SOLUTION INTRAMUSCULAR; INTRAVENOUS at 17:41

## 2024-04-15 RX ADMIN — Medication 325 MILLIGRAM(S): at 17:25

## 2024-04-15 RX ADMIN — SODIUM CHLORIDE 1300 MILLILITER(S): 9 INJECTION, SOLUTION INTRAVENOUS at 17:26

## 2024-04-15 RX ADMIN — SODIUM CHLORIDE 500 MILLILITER(S): 9 INJECTION, SOLUTION INTRAVENOUS at 23:56

## 2024-04-15 RX ADMIN — Medication 650 MILLIGRAM(S): at 17:25

## 2024-04-15 RX ADMIN — Medication 250 MILLIGRAM(S): at 18:26

## 2024-04-15 NOTE — ED PROVIDER NOTE - WR INTERPRETATION 1
PFT orders have been placed for Dr. Alberto to review.  Gertrudis NEAL, CMA/CMT....................4:05 PM    
ED CXR prelim, my independent interpretation - Dr. Jad Prasad: No pneumothorax, bilateral opacities left greater than right rotated film.  No free air.

## 2024-04-15 NOTE — ED PROVIDER NOTE - CARE PLAN
Principal Discharge DX:	Acute sepsis  Secondary Diagnosis:	Acute respiratory failure with hypoxia   1 Principal Discharge DX:	Acute sepsis  Secondary Diagnosis:	Acute respiratory failure with hypoxia  Secondary Diagnosis:	Bilateral pneumonia

## 2024-04-15 NOTE — ED PROVIDER NOTE - ATTENDING CONTRIBUTION TO CARE
58-year-old female history of Down syndrome nonverbal at baseline now sent in for evaluation of sepsis.  History obtained from aide and paperwork from group home.    vss, ill-appearing, febrile, tachycardic, borderline hypotensive, pulse ox 80 on room air, pale, but pink conj, anicteric, dry mucous membrane, neck supple, decreased breath sounds bilaterally RRR, equal radial pulses bilat, abd soft nondistended, no cva tend. no calves tend, no edema, no fnd. no rashes.  Old scar to the right hip

## 2024-04-15 NOTE — ED PROVIDER NOTE - OBJECTIVE STATEMENT
58-year-old female history of Down syndrome nonverbal at baseline previous frequent admissions now presents for evaluation of sepsis, with fever and tachycardia.  Information is provided by paperwork from group home and aide.

## 2024-04-15 NOTE — ED PROVIDER NOTE - PHYSICAL EXAMINATION
vss, ill-appearing, febrile, tachycardic, borderline hypotensive, pulse ox 80 on room air, pale, but pink conj, anicteric, dry mucous membrane, neck supple, decreased breath sounds bilaterally RRR, equal radial pulses bilat, abd soft nondistended, no cva tend. no calves tend, no edema, no fnd. no rashes.  Old scar to the right hip

## 2024-04-15 NOTE — ED PROVIDER NOTE - INTERPRETATION
ED EKG: my independent interpretation - Dr. Jad Prasad : Sinus tachycardia at 135, normal axis, no ST elevations,

## 2024-04-15 NOTE — ED PROVIDER NOTE - PROGRESS NOTE DETAILS
Authored by Dr. Prasad: Patient tolerating BiPAP.  Pulse ox 96% on room air.  Blood pressure is 120/55.  Heart rate is 137.  Patient is DNR/DNI except for trial of noninvasive.Sepsis suspected on arrival. Patient tolerating BiPAP.  Currently heart rate is 110.  Pulse ox 94–96 on on BiPAP.  Blood pressures reviewed.  Case discussed with ICU fellow.  Will place to stepdown.

## 2024-04-15 NOTE — ED PROVIDER NOTE - CLINICAL SUMMARY MEDICAL DECISION MAKING FREE TEXT BOX
Sepsis.  Febrile illness.  Hypoxia.  DNR/DNI.  Reviewed old chart.  IV fluids.  IV antibiotics broad-spectrum.  Recent hospitalization.    My independent interpretation - Dr. Jad Prasad - of the following studies labs, imaging, ekg that showed:    Appropriate medications for patient's presenting complaints were ordered and effects were reassessed.   Patient external records were reviewed specifically outpatient group home book.  Additional history obtained from patient's aide.    At this time, patient requires inpatient hospitalization- monitored setting.

## 2024-04-16 LAB
ALBUMIN SERPL ELPH-MCNC: 2.8 G/DL — LOW (ref 3.5–5.2)
ALP SERPL-CCNC: 88 U/L — SIGNIFICANT CHANGE UP (ref 30–115)
ALT FLD-CCNC: 11 U/L — SIGNIFICANT CHANGE UP (ref 0–41)
ANION GAP SERPL CALC-SCNC: 13 MMOL/L — SIGNIFICANT CHANGE UP (ref 7–14)
AST SERPL-CCNC: 13 U/L — SIGNIFICANT CHANGE UP (ref 0–41)
BASOPHILS # BLD AUTO: 0.05 K/UL — SIGNIFICANT CHANGE UP (ref 0–0.2)
BASOPHILS NFR BLD AUTO: 0.4 % — SIGNIFICANT CHANGE UP (ref 0–1)
BILIRUB SERPL-MCNC: 0.4 MG/DL — SIGNIFICANT CHANGE UP (ref 0.2–1.2)
BUN SERPL-MCNC: 7 MG/DL — LOW (ref 10–20)
CALCIUM SERPL-MCNC: 8.6 MG/DL — SIGNIFICANT CHANGE UP (ref 8.4–10.5)
CHLORIDE SERPL-SCNC: 100 MMOL/L — SIGNIFICANT CHANGE UP (ref 98–110)
CO2 SERPL-SCNC: 25 MMOL/L — SIGNIFICANT CHANGE UP (ref 17–32)
CREAT SERPL-MCNC: <0.5 MG/DL — LOW (ref 0.7–1.5)
EGFR: 115 ML/MIN/1.73M2 — SIGNIFICANT CHANGE UP
EOSINOPHIL # BLD AUTO: 0.06 K/UL — SIGNIFICANT CHANGE UP (ref 0–0.7)
EOSINOPHIL NFR BLD AUTO: 0.5 % — SIGNIFICANT CHANGE UP (ref 0–8)
GLUCOSE SERPL-MCNC: 119 MG/DL — HIGH (ref 70–99)
GRAM STN FLD: ABNORMAL
HCT VFR BLD CALC: 29.3 % — LOW (ref 37–47)
HGB BLD-MCNC: 8.8 G/DL — LOW (ref 12–16)
IMM GRANULOCYTES NFR BLD AUTO: 0.8 % — HIGH (ref 0.1–0.3)
LYMPHOCYTES # BLD AUTO: 1.06 K/UL — LOW (ref 1.2–3.4)
LYMPHOCYTES # BLD AUTO: 8.3 % — LOW (ref 20.5–51.1)
MAGNESIUM SERPL-MCNC: 1.6 MG/DL — LOW (ref 1.8–2.4)
MCHC RBC-ENTMCNC: 28.1 PG — SIGNIFICANT CHANGE UP (ref 27–31)
MCHC RBC-ENTMCNC: 30 G/DL — LOW (ref 32–37)
MCV RBC AUTO: 93.6 FL — SIGNIFICANT CHANGE UP (ref 81–99)
MONOCYTES # BLD AUTO: 0.47 K/UL — SIGNIFICANT CHANGE UP (ref 0.1–0.6)
MONOCYTES NFR BLD AUTO: 3.7 % — SIGNIFICANT CHANGE UP (ref 1.7–9.3)
MRSA PCR RESULT.: NEGATIVE — SIGNIFICANT CHANGE UP
NEUTROPHILS # BLD AUTO: 11.08 K/UL — HIGH (ref 1.4–6.5)
NEUTROPHILS NFR BLD AUTO: 86.3 % — HIGH (ref 42.2–75.2)
NRBC # BLD: 0 /100 WBCS — SIGNIFICANT CHANGE UP (ref 0–0)
PHOSPHATE SERPL-MCNC: 2.8 MG/DL — SIGNIFICANT CHANGE UP (ref 2.1–4.9)
PLATELET # BLD AUTO: 245 K/UL — SIGNIFICANT CHANGE UP (ref 130–400)
PMV BLD: 10.5 FL — HIGH (ref 7.4–10.4)
POTASSIUM SERPL-MCNC: 4.2 MMOL/L — SIGNIFICANT CHANGE UP (ref 3.5–5)
POTASSIUM SERPL-SCNC: 4.2 MMOL/L — SIGNIFICANT CHANGE UP (ref 3.5–5)
PROCALCITONIN SERPL-MCNC: 6.93 NG/ML — HIGH (ref 0.02–0.1)
PROT SERPL-MCNC: 5.4 G/DL — LOW (ref 6–8)
RBC # BLD: 3.13 M/UL — LOW (ref 4.2–5.4)
RBC # FLD: 23.1 % — HIGH (ref 11.5–14.5)
SODIUM SERPL-SCNC: 138 MMOL/L — SIGNIFICANT CHANGE UP (ref 135–146)
TSH SERPL-MCNC: 2.99 UIU/ML — SIGNIFICANT CHANGE UP (ref 0.27–4.2)
WBC # BLD: 12.82 K/UL — HIGH (ref 4.8–10.8)
WBC # FLD AUTO: 12.82 K/UL — HIGH (ref 4.8–10.8)

## 2024-04-16 PROCEDURE — 93970 EXTREMITY STUDY: CPT | Mod: 26

## 2024-04-16 PROCEDURE — 99223 1ST HOSP IP/OBS HIGH 75: CPT

## 2024-04-16 PROCEDURE — 99233 SBSQ HOSP IP/OBS HIGH 50: CPT

## 2024-04-16 RX ORDER — LANOLIN ALCOHOL/MO/W.PET/CERES
3 CREAM (GRAM) TOPICAL AT BEDTIME
Refills: 0 | Status: DISCONTINUED | OUTPATIENT
Start: 2024-04-16 | End: 2024-05-29

## 2024-04-16 RX ORDER — MAGNESIUM SULFATE 500 MG/ML
2 VIAL (ML) INJECTION
Refills: 0 | Status: COMPLETED | OUTPATIENT
Start: 2024-04-16 | End: 2024-04-16

## 2024-04-16 RX ORDER — SODIUM CHLORIDE 9 MG/ML
500 INJECTION, SOLUTION INTRAVENOUS ONCE
Refills: 0 | Status: COMPLETED | OUTPATIENT
Start: 2024-04-16 | End: 2024-04-16

## 2024-04-16 RX ORDER — NOREPINEPHRINE BITARTRATE/D5W 8 MG/250ML
0.05 PLASTIC BAG, INJECTION (ML) INTRAVENOUS
Qty: 8 | Refills: 0 | Status: DISCONTINUED | OUTPATIENT
Start: 2024-04-16 | End: 2024-04-20

## 2024-04-16 RX ORDER — TIOTROPIUM BROMIDE 18 UG/1
2 CAPSULE ORAL; RESPIRATORY (INHALATION) DAILY
Refills: 0 | Status: DISCONTINUED | OUTPATIENT
Start: 2024-04-16 | End: 2024-04-16

## 2024-04-16 RX ORDER — INSULIN LISPRO 100/ML
VIAL (ML) SUBCUTANEOUS
Refills: 0 | Status: DISCONTINUED | OUTPATIENT
Start: 2024-04-16 | End: 2024-04-16

## 2024-04-16 RX ORDER — LEVETIRACETAM 250 MG/1
750 TABLET, FILM COATED ORAL
Refills: 0 | Status: DISCONTINUED | OUTPATIENT
Start: 2024-04-16 | End: 2024-05-29

## 2024-04-16 RX ORDER — LEVETIRACETAM 250 MG/1
750 TABLET, FILM COATED ORAL ONCE
Refills: 0 | Status: COMPLETED | OUTPATIENT
Start: 2024-04-16 | End: 2024-04-16

## 2024-04-16 RX ORDER — DEXTROSE 50 % IN WATER 50 %
12.5 SYRINGE (ML) INTRAVENOUS ONCE
Refills: 0 | Status: DISCONTINUED | OUTPATIENT
Start: 2024-04-16 | End: 2024-04-16

## 2024-04-16 RX ORDER — VANCOMYCIN HCL 1 G
750 VIAL (EA) INTRAVENOUS EVERY 12 HOURS
Refills: 0 | Status: DISCONTINUED | OUTPATIENT
Start: 2024-04-16 | End: 2024-04-16

## 2024-04-16 RX ORDER — PETROLATUM,WHITE
1 JELLY (GRAM) TOPICAL
Refills: 0 | Status: DISCONTINUED | OUTPATIENT
Start: 2024-04-16 | End: 2024-05-29

## 2024-04-16 RX ORDER — SENNA PLUS 8.6 MG/1
2 TABLET ORAL AT BEDTIME
Refills: 0 | Status: DISCONTINUED | OUTPATIENT
Start: 2024-04-16 | End: 2024-05-29

## 2024-04-16 RX ORDER — RALOXIFENE HYDROCHLORIDE 60 MG/1
60 TABLET, COATED ORAL DAILY
Refills: 0 | Status: DISCONTINUED | OUTPATIENT
Start: 2024-04-16 | End: 2024-05-29

## 2024-04-16 RX ORDER — GABAPENTIN 400 MG/1
300 CAPSULE ORAL
Refills: 0 | Status: DISCONTINUED | OUTPATIENT
Start: 2024-04-16 | End: 2024-04-18

## 2024-04-16 RX ORDER — DEXTROSE 50 % IN WATER 50 %
25 SYRINGE (ML) INTRAVENOUS ONCE
Refills: 0 | Status: DISCONTINUED | OUTPATIENT
Start: 2024-04-16 | End: 2024-04-16

## 2024-04-16 RX ORDER — LIDOCAINE 4 G/100G
1 CREAM TOPICAL EVERY 24 HOURS
Refills: 0 | Status: DISCONTINUED | OUTPATIENT
Start: 2024-04-16 | End: 2024-04-16

## 2024-04-16 RX ORDER — CALCIUM CARBONATE 500(1250)
1 TABLET ORAL DAILY
Refills: 0 | Status: DISCONTINUED | OUTPATIENT
Start: 2024-04-16 | End: 2024-05-29

## 2024-04-16 RX ORDER — MIDODRINE HYDROCHLORIDE 2.5 MG/1
10 TABLET ORAL EVERY 8 HOURS
Refills: 0 | Status: DISCONTINUED | OUTPATIENT
Start: 2024-04-16 | End: 2024-04-19

## 2024-04-16 RX ORDER — ENOXAPARIN SODIUM 100 MG/ML
30 INJECTION SUBCUTANEOUS EVERY 24 HOURS
Refills: 0 | Status: DISCONTINUED | OUTPATIENT
Start: 2024-04-16 | End: 2024-05-29

## 2024-04-16 RX ORDER — IPRATROPIUM/ALBUTEROL SULFATE 18-103MCG
3 AEROSOL WITH ADAPTER (GRAM) INHALATION EVERY 6 HOURS
Refills: 0 | Status: DISCONTINUED | OUTPATIENT
Start: 2024-04-16 | End: 2024-05-29

## 2024-04-16 RX ORDER — ACETAMINOPHEN 500 MG
650 TABLET ORAL EVERY 6 HOURS
Refills: 0 | Status: DISCONTINUED | OUTPATIENT
Start: 2024-04-16 | End: 2024-05-15

## 2024-04-16 RX ORDER — FAMOTIDINE 10 MG/ML
20 INJECTION INTRAVENOUS
Refills: 0 | Status: DISCONTINUED | OUTPATIENT
Start: 2024-04-16 | End: 2024-05-29

## 2024-04-16 RX ORDER — MEROPENEM 1 G/30ML
1000 INJECTION INTRAVENOUS EVERY 8 HOURS
Refills: 0 | Status: DISCONTINUED | OUTPATIENT
Start: 2024-04-16 | End: 2024-04-23

## 2024-04-16 RX ORDER — ONDANSETRON 8 MG/1
4 TABLET, FILM COATED ORAL EVERY 8 HOURS
Refills: 0 | Status: DISCONTINUED | OUTPATIENT
Start: 2024-04-16 | End: 2024-05-29

## 2024-04-16 RX ADMIN — GABAPENTIN 300 MILLIGRAM(S): 400 CAPSULE ORAL at 06:09

## 2024-04-16 RX ADMIN — SENNA PLUS 2 TABLET(S): 8.6 TABLET ORAL at 22:07

## 2024-04-16 RX ADMIN — Medication 3 MILLILITER(S): at 14:00

## 2024-04-16 RX ADMIN — MEROPENEM 100 MILLIGRAM(S): 1 INJECTION INTRAVENOUS at 01:08

## 2024-04-16 RX ADMIN — MEROPENEM 100 MILLIGRAM(S): 1 INJECTION INTRAVENOUS at 06:10

## 2024-04-16 RX ADMIN — FAMOTIDINE 20 MILLIGRAM(S): 10 INJECTION INTRAVENOUS at 17:28

## 2024-04-16 RX ADMIN — Medication 3 MILLILITER(S): at 22:06

## 2024-04-16 RX ADMIN — MIDODRINE HYDROCHLORIDE 10 MILLIGRAM(S): 2.5 TABLET ORAL at 06:10

## 2024-04-16 RX ADMIN — Medication 650 MILLIGRAM(S): at 23:48

## 2024-04-16 RX ADMIN — Medication 1 DROP(S): at 06:11

## 2024-04-16 RX ADMIN — Medication 250 MILLIGRAM(S): at 06:09

## 2024-04-16 RX ADMIN — LEVETIRACETAM 750 MILLIGRAM(S): 250 TABLET, FILM COATED ORAL at 17:28

## 2024-04-16 RX ADMIN — SODIUM CHLORIDE 500 MILLILITER(S): 9 INJECTION, SOLUTION INTRAVENOUS at 03:35

## 2024-04-16 RX ADMIN — MIDODRINE HYDROCHLORIDE 10 MILLIGRAM(S): 2.5 TABLET ORAL at 22:07

## 2024-04-16 RX ADMIN — MEROPENEM 100 MILLIGRAM(S): 1 INJECTION INTRAVENOUS at 22:06

## 2024-04-16 RX ADMIN — RALOXIFENE HYDROCHLORIDE 60 MILLIGRAM(S): 60 TABLET, COATED ORAL at 12:42

## 2024-04-16 RX ADMIN — MIDODRINE HYDROCHLORIDE 10 MILLIGRAM(S): 2.5 TABLET ORAL at 13:51

## 2024-04-16 RX ADMIN — Medication 1 TABLET(S): at 12:42

## 2024-04-16 RX ADMIN — Medication 3 MILLILITER(S): at 08:56

## 2024-04-16 RX ADMIN — Medication 4.01 MICROGRAM(S)/KG/MIN: at 08:55

## 2024-04-16 RX ADMIN — Medication 25 GRAM(S): at 16:00

## 2024-04-16 RX ADMIN — Medication 25 GRAM(S): at 12:42

## 2024-04-16 RX ADMIN — Medication 650 MILLIGRAM(S): at 06:30

## 2024-04-16 RX ADMIN — Medication 4.01 MICROGRAM(S)/KG/MIN: at 12:43

## 2024-04-16 RX ADMIN — Medication 1 APPLICATION(S): at 06:11

## 2024-04-16 RX ADMIN — FAMOTIDINE 20 MILLIGRAM(S): 10 INJECTION INTRAVENOUS at 06:11

## 2024-04-16 RX ADMIN — ENOXAPARIN SODIUM 30 MILLIGRAM(S): 100 INJECTION SUBCUTANEOUS at 13:51

## 2024-04-16 RX ADMIN — Medication 1 APPLICATION(S): at 17:29

## 2024-04-16 RX ADMIN — LEVETIRACETAM 750 MILLIGRAM(S): 250 TABLET, FILM COATED ORAL at 06:11

## 2024-04-16 RX ADMIN — GABAPENTIN 300 MILLIGRAM(S): 400 CAPSULE ORAL at 17:29

## 2024-04-16 RX ADMIN — Medication 1 DROP(S): at 17:28

## 2024-04-16 RX ADMIN — LEVETIRACETAM 400 MILLIGRAM(S): 250 TABLET, FILM COATED ORAL at 01:19

## 2024-04-16 RX ADMIN — MEROPENEM 100 MILLIGRAM(S): 1 INJECTION INTRAVENOUS at 13:51

## 2024-04-16 NOTE — H&P ADULT - HISTORY OF PRESENT ILLNESS
Pt is a 58F w/ PMHx Down Syndrome, nonverbal at baseline, Hypothyroidism, Cerebral palsy and Seizure Disorders presents to the ED from [facility] for respiratory distress and high grade fever. Of note patient recently DC on 04/08/2024 with prolonged 3 month hospital course for similar presentation    On admission vitals: BP 74/55  RR 22 T103F sat 84% on RA placed on Bipap sat 100% on my examination    Labs WBC 12    Patient found to be septic on admission. Admitted to SDU for management of acute respiratory distress 2/2 CAP/Aspiration PNA.

## 2024-04-16 NOTE — H&P ADULT - ATTENDING COMMENTS
*In the event of discrepancy, my note supersedes the resident note.  Date seen: 4/16/24   Agree with HPI above. ROS negative except per HPI.   I reviewed and edited the physical exam above.   Pertinent labs and radiology reviewed and as above.    Assessment/Plan:   59yo F w/ pmhx of Down's syndrome, cerebral palsy, seizure disorder presents from [facility] for respiratory distress. Admitted to SDU for severe sepsis and acute hypoxemic respiratory failure likely secondary to recurrent CAP.     #acute hypoxemic respiratory failure   #severe sepsis on admission  #possible recurrent CAP   #chronic hypotension   - Tmax 104.4F, 84% RA; hypotensive initially SBP 70s   - CXR: possible basilar opacities, f/u official read (intepreted by me)   - f/u/covid/RSV negative; WBC 12.25  - f/u bcx; get mrsa swab and procal; repeat fungitell (previously positive s/p caspo)   - currently saturating well on bipap; wean O2 as tolerated; admitted to SDU f/u pulm-crit   - c/w midodrine when off bipap   - c/w danna and vanc IV  - ID consult   - poor progonsis, consider palliative care eval as patient has had multiple re-admissions     #seizure disorder  #Down syndrome  - c/w home meds when off bipap  - speech/swallow eval when off bipap     DVT ppx: lovenox  Code status: DNR/DNI, trial of NIV  Dispo: from [facility]; f/u pulm-crit, ID consult, f/u cultures

## 2024-04-16 NOTE — H&P ADULT - ASSESSMENT
Pt is a 58F w/ PMHx Down Syndrome, nonverbal at baseline, Hypothyroidism, Cerebral palsy and Seizure Disorders presents to the ED from [facility] for respiratory distress and high grade fever. Admitted for pneumonia    #Acute hypoxic respiratory failure  #Pneumonia  - Sepsis present on admission (Leukocytosis to 12, febrile to 103, tachy to 136, requiring BiPAP)  - C/w bipap  - Start Alicia and Vanc (hx of Proteus ESBL in cultures [facility]SE)  - f/u UA, BCx, MRSA swab, and Procal    - ID consult    #Seizure d/o  - Recent ED visit, Keppra increased to 750mg BID, continue    #Hypothyroidism  - not on any medications at home  - f/u TSH    #h/o Right Foot OM (1st toe stump and 2nd distal phalanx)  #h/o Multiple Left Foot deep tissue injuries  #h/o Sacral Wound  - Past wound cultures grew Proteus and ESBL E coli  - reposition q2hrs to prevent pressure injury  - local wound care    #Down Syndrome  #Cerebral Palsy  #Non-Verbal    # Misc  - DVT Prophylaxis: enoxaparin Injectable  - GI Prophylaxis: famotidine   - Diet: Diet, NPO w/tube feeds  - Code Status: DNR/DNI  - Dispo: SDU

## 2024-04-16 NOTE — ED PROVIDER NOTE - INTERNATIONAL TRAVEL
Anton Gomez (:  1992) is a 32 y.o. male,Established patient, here for evaluation of the following chief complaint(s):  Neck Pain (Started 3 weeks ago. ) and Allergies (Would like an allergy shot)         ASSESSMENT/PLAN:  1. Seasonal allergies  -     triamcinolone acetonide (KENALOG-40) injection 60 mg; 60 mg, IntraMUSCular, ONCE, 1 dose, On 24 at 0900  2. Neck muscle strain, initial encounter  3. Strain of right trapezius muscle, initial encounter    Plan:     1. Seasonal allergies  Kenalog injection today   - triamcinolone acetonide (KENALOG-40) injection 60 mg    2. Neck muscle strain, initial encounter  Muscular strain  Continue heat/ice and massage  Daily home exercises/stretches provided  Use Ibuprofen/Tylenol as needed for pain  If pain persists will consider muscle relaxer at bedtime    3. Strain of right trapezius muscle, initial encounter        Return if symptoms worsen or fail to improve.         Subjective   SUBJECTIVE/OBJECTIVE:  Anton presents today for complaints of neck pain and seasonal allergies. He states 3 weeks ago he started with neck pain at the base of his neck. He has decreased ROM. He denies injury. He has been to the chiropractor 3 times which has helped. He states he is stiffer in the morning. He has been using OTC topicals and massage. He feels ROM and pain is improving. He notices pain is worse looking up and down and ear to shoulder. He states he does not want to do physical therapy.   He also complains of nasal drainage, itching eyes, watery and swollen. He states he usually gets a kenalog injection during spring time. He has seasonal allergies. He denies fever or body aches.     Neck Pain   This is a new problem. The current episode started 1 to 4 weeks ago. The problem occurs constantly. The pain is associated with nothing. The pain is present in the midline. The quality of the pain is described as shooting. The pain is at a severity of 3/10. The pain is mild. 
No

## 2024-04-16 NOTE — CONSULT NOTE ADULT - SUBJECTIVE AND OBJECTIVE BOX
TEA LINTON  58y, Female  Allergy: No Known Allergies      CHIEF COMPLAINT: Pneumonia (16 Apr 2024 06:39)      HPI:  Ms Linton is a 58 MARÍA w a PMH of Trisomy 21, a recent admission [id number] for RSV PNA, nonverbal at baseline, hypotension, cerebral palsey w seizure disorder, ESBL isolated from ulcer of rt foot 11/23 BIBEMS from [facility] for respiratory distress and high fever. Patient SOA w , RR 22 and febrile to 102.8 rectally. Patient also hypotensive to 82/51. Labs notable for leukocytosis of 12.25 w 3.6% bands, large platelets and a compensated Respiratory acidosis. Imaging notable for a Rt Mid lobe ground glass opacification. Patient MRSA Negative w RVP negative for COVID, RSV and Flu.     Infectious Diseases History:  Old Micro Data/Cultures:   wound: ESBL (carbapenem sensitive)  MRSA nares PCR Negative      FAMILY HISTORY:    PAST MEDICAL & SURGICAL HISTORY:  Down syndrome      Osteoporosis      Mild anemia      Neuropathy      S/P debridement  of R hip on 3/2/21          SOCIAL HISTORY  Social History:  Lives at nursing home (14 Oct 2023 20:33)      Recent Travel:  Other Exposures:     ROS  Patient non verbal at baseline unable to obtain     VITALS:  T(F): 99.9, Max: 104.4 (04-15-24 @ 17:10)  HR: 80  BP: 118/59  RR: 22Vital Signs Last 24 Hrs  T(C): 37.7 (16 Apr 2024 07:26), Max: 40.2 (15 Apr 2024 17:10)  T(F): 99.9 (16 Apr 2024 07:26), Max: 104.4 (15 Apr 2024 17:10)  HR: 80 (16 Apr 2024 08:41) (72 - 138)  BP: 118/59 (16 Apr 2024 09:10) (73/44 - 120/55)  BP(mean): 80 (16 Apr 2024 09:10) (53 - 80)  RR: 22 (16 Apr 2024 07:26) (18 - 22)  SpO2: 99% (16 Apr 2024 08:32) (84% - 100%)    Parameters below as of 16 Apr 2024 07:26  Patient On (Oxygen Delivery Method): BiPAP/CPAP        PHYSICAL EXAM:  Gen: NAD, resting in bed  HEENT: Normocephalic, atraumatic  Neck: supple, no lymphadenopathy  CV: Regular rate & regular rhythm  Lungs: CTAB  Abdomen: Soft, BS present  Ext: Warm, well perfused  Neuro: non focal, awake  Skin: no rash, no lesions  Lines: no phlebitis    TESTS & MEASUREMENTS:                        8.8    12.82 )-----------( 245      ( 16 Apr 2024 07:03 )             29.3     04-15    138  |  98  |  12  ----------------------------<  144<H>  4.6   |  26  |  0.7    Ca    9.0      15 Apr 2024 17:58    TPro  6.6  /  Alb  3.2<L>  /  TBili  0.5  /  DBili  x   /  AST  25  /  ALT  14  /  AlkPhos  106  04-15      LIVER FUNCTIONS - ( 15 Apr 2024 17:58 )  Alb: 3.2 g/dL / Pro: 6.6 g/dL / ALK PHOS: 106 U/L / ALT: 14 U/L / AST: 25 U/L / GGT: x           Urinalysis Basic - ( 15 Apr 2024 17:58 )    Color: x / Appearance: x / SG: x / pH: x  Gluc: 144 mg/dL / Ketone: x  / Bili: x / Urobili: x   Blood: x / Protein: x / Nitrite: x   Leuk Esterase: x / RBC: x / WBC x   Sq Epi: x / Non Sq Epi: x / Bacteria: x          Blood Gas Venous - Lactate: 1.7 mmol/L (04-15-24 @ 17:19)      INFECTIOUS DISEASES TESTING  MRSA PCR Result.: Negative (03-16-24 @ 23:45)  Fungitell: 81 pg/mL (03-13-24 @ 17:24)  Fungitell: 73 pg/mL (02-23-24 @ 18:19)  MRSA PCR Result.: Negative (02-20-24 @ 13:42)  Fungitell: 76 pg/mL (02-19-24 @ 16:00)  MRSA PCR Result.: Negative (02-15-24 @ 06:20)  MRSA PCR Result.: Negative (02-12-24 @ 10:28)  Fungitell: 63 pg/mL (02-06-24 @ 11:27)  Fungitell: 65 pg/mL (02-06-24 @ 04:43)  MRSA PCR Result.: Negative (02-03-24 @ 21:32)  MRSA PCR Result.: Negative (01-22-24 @ 05:30)  Legionella Antigen, Urine: Negative (01-21-24 @ 05:00)  Fungitell: 325 pg/mL (01-20-24 @ 21:23)  Fungitell: 138 pg/mL (11-07-23 @ 08:08)  Fungitell: 115 pg/mL (11-02-23 @ 11:40)  MRSA PCR Result.: Negative (10-17-23 @ 17:20)  Fungitell: >500 pg/mL (10-17-23 @ 17:20)  Legionella Antigen, Urine: Negative (09-30-23 @ 14:36)  MRSA PCR Result.: Negative (09-29-23 @ 16:50)  MRSA PCR Result.: Negative (08-12-23 @ 06:10)  MRSA PCR Result.: Negative (08-04-23 @ 14:52)      RADIOLOGY & ADDITIONAL TESTS:  I have personally reviewed the last available Chest xray  CXR; Rt mid lobe ground glass opacity (wet read)             CARDIOLOGY TESTING  12 Lead ECG:   Ventricular Rate 135 BPM    Atrial Rate 135 BPM    P-R Interval 126 ms    QRS Duration 64 ms    Q-T Interval 288 ms    QTC Calculation(Bazett) 432 ms    P Axis 74 degrees    R Axis 89 degrees    T Axis 68 degrees    Diagnosis Line Sinus tachycardia  Otherwise normal ECG    Confirmed by MARJAN MENDES MD ([id number]) on 4/16/2024 8:43:18 AM (04-15-24 @ 17:31)      All available historical records have been reviewed    MEDICATIONS  albuterol/ipratropium for Nebulization 3  AQUAPHOR (petrolatum Ointment) 1  artificial  tears Solution 1  calcium carbonate   1250 mG (OsCal) 1  enoxaparin Injectable 30  famotidine    Tablet 20  gabapentin 300  levETIRAcetam  Solution 750  meropenem  IVPB 1000  midodrine 10  norepinephrine Infusion 0.05  raloxifene 60  senna 2  vancomycin  IVPB 750      ANTIBIOTICS:  meropenem  IVPB 1000 milliGRAM(s) IV Intermittent every 8 hours  vancomycin  IVPB 750 milliGRAM(s) IV Intermittent every 12 hours      All available historical data has been reviewed     TEA LINTON  58y, Female  Allergy: No Known Allergies      CHIEF COMPLAINT: Pneumonia (16 Apr 2024 06:39)      HPI:  Ms Linton is a 58 MARÍA w a PMH of Trisomy 21, a recent admission [id number] for RSV PNA, nonverbal at baseline, chronic PEG, hypotension, cerebral palsey w seizure disorder, ESBL isolated from ulcer of rt foot 11/23 BIBEMS from [facility] for respiratory distress and high fever. Patient SOA w , RR 22 and febrile to 102.8 rectally. Patient also hypotensive to 82/51. Labs notable for leukocytosis of 12.25 w 3.6% bands, large platelets and a compensated Respiratory acidosis. Imaging notable for a Rt Mid lobe ground glass opacification. Patient MRSA Negative w RVP negative for COVID, RSV and Flu.     Infectious Diseases History:  Old Micro Data/Cultures:   wound: ESBL (carbapenem sensitive)  MRSA nares PCR Negative      FAMILY HISTORY:    PAST MEDICAL & SURGICAL HISTORY:  Down syndrome      Osteoporosis      Mild anemia      Neuropathy    PEG      S/P debridement  of R hip on 3/2/21          SOCIAL HISTORY  Social History:  Lives at nursing home (14 Oct 2023 20:33)      Recent Travel: NO   Other Exposures: prolonged and frequent hospitalizations    ROS  Patient non verbal at baseline unable to obtain     VITALS:  T(F): 99.9, Max: 104.4 (04-15-24 @ 17:10)  HR: 80  BP: 118/59  RR: 22Vital Signs Last 24 Hrs  T(C): 37.7 (16 Apr 2024 07:26), Max: 40.2 (15 Apr 2024 17:10)  T(F): 99.9 (16 Apr 2024 07:26), Max: 104.4 (15 Apr 2024 17:10)  HR: 80 (16 Apr 2024 08:41) (72 - 138)  BP: 118/59 (16 Apr 2024 09:10) (73/44 - 120/55)  BP(mean): 80 (16 Apr 2024 09:10) (53 - 80)  RR: 22 (16 Apr 2024 07:26) (18 - 22)  SpO2: 99% (16 Apr 2024 08:32) (84% - 100%)    Parameters below as of 16 Apr 2024 07:26  Patient On (Oxygen Delivery Method): BiPAP/CPAP        PHYSICAL EXAM:  Gen: NAD, resting in bed, laying on lefts side, contracted, BiPAP on   HEENT: Normocephalic, atraumatic  Neck:  no lymphadenopathy  CV: Regular rate & regular rhythm  Lungs: BL Breath sounds, unable to appreciate crackles over BiPAP   Abdomen: Soft, BS present, PEG site w/o erythema   Ext: Warm, contracted, BL Feet wrapped for protection   Neuro: tracks, non verbal   Skin: blanching macular rash back mostly upper, clean sinus tract at base of coccyx   Lines: no phlebitis    TESTS & MEASUREMENTS:                        8.8    12.82 )-----------( 245      ( 16 Apr 2024 07:03 )             29.3     04-15    138  |  98  |  12  ----------------------------<  144<H>  4.6   |  26  |  0.7    Ca    9.0      15 Apr 2024 17:58    TPro  6.6  /  Alb  3.2<L>  /  TBili  0.5  /  DBili  x   /  AST  25  /  ALT  14  /  AlkPhos  106  04-15      LIVER FUNCTIONS - ( 15 Apr 2024 17:58 )  Alb: 3.2 g/dL / Pro: 6.6 g/dL / ALK PHOS: 106 U/L / ALT: 14 U/L / AST: 25 U/L / GGT: x           Urinalysis Basic - ( 15 Apr 2024 17:58 )    Color: x / Appearance: x / SG: x / pH: x  Gluc: 144 mg/dL / Ketone: x  / Bili: x / Urobili: x   Blood: x / Protein: x / Nitrite: x   Leuk Esterase: x / RBC: x / WBC x   Sq Epi: x / Non Sq Epi: x / Bacteria: x          Blood Gas Venous - Lactate: 1.7 mmol/L (04-15-24 @ 17:19)      INFECTIOUS DISEASES TESTING  MRSA PCR Result.: Negative (03-16-24 @ 23:45)  Fungitell: 81 pg/mL (03-13-24 @ 17:24)  Fungitell: 73 pg/mL (02-23-24 @ 18:19)  MRSA PCR Result.: Negative (02-20-24 @ 13:42)  Fungitell: 76 pg/mL (02-19-24 @ 16:00)  MRSA PCR Result.: Negative (02-15-24 @ 06:20)  MRSA PCR Result.: Negative (02-12-24 @ 10:28)  Fungitell: 63 pg/mL (02-06-24 @ 11:27)  Fungitell: 65 pg/mL (02-06-24 @ 04:43)  MRSA PCR Result.: Negative (02-03-24 @ 21:32)  MRSA PCR Result.: Negative (01-22-24 @ 05:30)  Legionella Antigen, Urine: Negative (01-21-24 @ 05:00)  Fungitell: 325 pg/mL (01-20-24 @ 21:23)  Fungitell: 138 pg/mL (11-07-23 @ 08:08)  Fungitell: 115 pg/mL (11-02-23 @ 11:40)  MRSA PCR Result.: Negative (10-17-23 @ 17:20)  Fungitell: >500 pg/mL (10-17-23 @ 17:20)  Legionella Antigen, Urine: Negative (09-30-23 @ 14:36)  MRSA PCR Result.: Negative (09-29-23 @ 16:50)  MRSA PCR Result.: Negative (08-12-23 @ 06:10)  MRSA PCR Result.: Negative (08-04-23 @ 14:52)      RADIOLOGY & ADDITIONAL TESTS:  I have personally reviewed the last available Chest xray  CXR; Rt mid lobe ground glass opacity (wet read)             CARDIOLOGY TESTING  12 Lead ECG:   Ventricular Rate 135 BPM    Atrial Rate 135 BPM    P-R Interval 126 ms    QRS Duration 64 ms    Q-T Interval 288 ms    QTC Calculation(Bazett) 432 ms    P Axis 74 degrees    R Axis 89 degrees    T Axis 68 degrees    Diagnosis Line Sinus tachycardia  Otherwise normal ECG    Confirmed by MARJAN MENDES MD ([id number]) on 4/16/2024 8:43:18 AM (04-15-24 @ 17:31)      All available historical records have been reviewed    MEDICATIONS  albuterol/ipratropium for Nebulization 3  AQUAPHOR (petrolatum Ointment) 1  artificial  tears Solution 1  calcium carbonate   1250 mG (OsCal) 1  enoxaparin Injectable 30  famotidine    Tablet 20  gabapentin 300  levETIRAcetam  Solution 750  meropenem  IVPB 1000  midodrine 10  norepinephrine Infusion 0.05  raloxifene 60  senna 2  vancomycin  IVPB 750      ANTIBIOTICS:  meropenem  IVPB 1000 milliGRAM(s) IV Intermittent every 8 hours  vancomycin  IVPB 750 milliGRAM(s) IV Intermittent every 12 hours      All available historical data has been reviewed

## 2024-04-16 NOTE — CONSULT NOTE ADULT - ASSESSMENT
Ms Linton is a 58 MARÍA w a PMH of Trisomy 21, a recent admission [id number] for RSV PNA w MICU stay (never intubated), nonverbal at baseline, hypotension, cerebral palsey w seizure disorder, ESBL isolated from ulcer of rt foot 11/23 BIBEMS from [facility] for respiratory distress and high fever. Patient SOA w , RR 22 and febrile to 102.8 rectally. Patient also hypotensive to 82/51. Labs notable for leukocytosis of 12.25 w 3.6% bands, large platelets and a compensated Respiratory acidosis. Imaging notable for a Rt Mid lobe ground glass opacification. Patient MRSA Negative w RVP negative for COVID, RSV and Flu.     IMPRESSION  #SOA (febrile, tachypneic, leukocytosis, source: lung)   #Rt Mid lobe GG opacity  #Acute hypoxic respiratory distress  #compensated Respiratory acidosis  #Ho Trisomy 21  #Ho Cerebral palsey  #Ho PEG  likely aspiration PNA, given recent lengthly stay must cover for Pseudomonas, Hempophilus, Mycoplasma  -obtain urine Legionella (less likely given no hyponatremia or GI symptoms)   -f/u Blood cultures  -obtain Chest CT  -start Unasyn and Azithromycin (1g TID) for Strep, H.Flu, mycoplasma  -start Pip-tazo for pseudomonas (can also c/w Meropenem given sepsis)   -c/w NIV for Spo2 goal of >95%          This is a pended note. All final recommendations to follow pending discussion with ID Attending    Ms Linton is a 58 MARÍA w a PMH of Trisomy 21, a recent admission [id number] for RSV PNA w MICU stay (never intubated), nonverbal at baseline, hypotension, cerebral palsey w seizure disorder, ESBL isolated from ulcer of rt foot 11/23 BIBEMS from [facility] for respiratory distress and high fever. Patient SOA w , RR 22 and febrile to 102.8 rectally. Patient also hypotensive to 82/51. Labs notable for leukocytosis of 12.25 w 3.6% bands, large platelets and a compensated Respiratory acidosis. Imaging notable for a Rt Mid lobe ground glass opacification. Patient MRSA Negative w RVP negative for COVID, RSV and Flu.     IMPRESSION  #SOA (febrile, tachypneic, leukocytosis, source: lung)   #Rt Mid lobe GG opacity  #Acute hypoxic respiratory distress  #compensated Respiratory acidosis  #macular blanching rash  #febrile  #Ho Trisomy 21  #Ho Cerebral palsey  #Ho PEG  likely aspiration PNA given PEG (placed during last admission), given recent lengthily stay must cover for S. Aureus, Pseudomonas, Hempophilus, Mycoplasma  -obtain urine Legionella (less likely given no hyponatremia or GI symptoms)   -f/u Blood cultures  -obtain Chest CT  -start Unasyn and Azithromycin (1g TID) for Strep, H.Flu, mycoplasma, MSSA  -start Pip-tazo for pseudomonas (can also c/w Meropenem given sepsis)   -c/w NIV for Spo2 goal of >95%          This is a pended note. All final recommendations to follow pending discussion with ID Attending    Ms Linton is a 58 MARÍA w a PMH of Trisomy 21, a recent admission [id number] for RSV PNA w MICU stay (never intubated), nonverbal at baseline, hypotension, cerebral palsey w seizure disorder, ESBL isolated from ulcer of rt foot 11/23 BIBEMS from [facility] for respiratory distress and high fever. Patient SOA w , RR 22 and febrile to 102.8 rectally. Patient also hypotensive to 82/51. Labs notable for leukocytosis of 12.25 w 3.6% bands, large platelets and a compensated Respiratory acidosis. Imaging notable for a Rt Mid lobe ground glass opacification. Patient MRSA Negative w RVP negative for COVID, RSV and Flu.     IMPRESSION  #SOA (febrile, tachypneic, leukocytosis, source: lung)   #Rt Mid lobe GG opacity  #Acute hypoxic respiratory distress  #compensated Respiratory acidosis  #macular blanching rash  #febrile  #Ho Trisomy 21  #Ho Cerebral palsey  #Ho PEG  likely aspiration PNA given CP, tris21, bedbound and PEG (placed during last admission),   given recent lengthily stay must cover for S. Aureus, Pseudomonas, Haemophilus, Mycoplasma  -obtain urine Legionella and pneumococcal urine antigen    -f/u Blood cultures  -obtain Chest CT  - given recent hospitalization and group home facility must cover for Pseudomonas, Strep, H.Flu, mycoplasma, MSSA(given hospitalization w abx w/in 90 day)   -MRSA Nares Negative  - DC vancomycin  -C/w Meropenem until sensitivities in then down grade Abx (likely zosyn)   -c/w NIV for Spo2 goal of >95%  - consider full RVP though less likely viral given presentation           This is a pended note. All final recommendations to follow pending discussion with ID Attending    Ms Linton is a 58 MARÍA w a PMH of Trisomy 21, a recent admission [id number] for RSV PNA w MICU stay (never intubated), nonverbal at baseline, hypotension, cerebral palsey w seizure disorder, ESBL isolated from ulcer of rt foot 11/23 BIBEMS from [facility] for respiratory distress and high fever. Patient SOA w , RR 22 and febrile to 102.8 rectally. Patient also hypotensive to 82/51. Labs notable for leukocytosis of 12.25 w 3.6% bands, large platelets and a compensated Respiratory acidosis. Imaging notable for a Rt Mid lobe ground glass opacification. Patient MRSA Negative w RVP negative for COVID, RSV and Flu.     IMPRESSION  #SOA (febrile, tachypneic, leukocytosis, source: lung)   #Rt Mid lobe GG opacity  #Acute hypoxic respiratory distress  #compensated Respiratory acidosis  #macular blanching rash  #febrile  #Ho Trisomy 21  #Ho Cerebral palsey  #Ho PEG  likely aspiration PNA given CP, tris21, bedbound and PEG (placed during last admission),   given recent lengthily stay must cover for S. Aureus, Pseudomonas, Haemophilus, Mycoplasma  -obtain urine Legionella and pneumococcal urine antigen    -f/u Blood cultures  -obtain Chest CT  - given recent hospitalization and group home facility must cover for Pseudomonas, Strep, H.Flu, mycoplasma, MSSA(given hospitalization w abx w/in 90 day)   -MRSA Nares Negative  - DC vancomycin  -C/w Meropenem until sensitivities in then down grade Abx (likely zosyn)   -c/w NIV for Spo2 goal of >95%  - consider full RVP though less likely viral given presentation

## 2024-04-16 NOTE — H&P ADULT - NSHPPHYSICALEXAM_GEN_ALL_CORE
VITALS:   T(C): 40.2 (04-15-24 @ 17:10), Max: 40.2 (04-15-24 @ 17:10)  HR: 97 (04-15-24 @ 23:25) (97 - 138)  BP: 93/54 (04-16-24 @ 00:39) (74/55 - 120/55)  RR: 20 (04-15-24 @ 23:25) (18 - 22)  SpO2: 100% (04-15-24 @ 23:25) (84% - 100%)    GENERAL: NAD, lying in bed comfortably  HEENT:  Atraumatic, Normocephalic  CHEST/LUNG: Course breath sounds b/l at bases  HEART: Regular rate and rhythm  ABDOMEN: BSx4; Soft, nontender, nondistended, PEG and abd binder in place  EXTREMITIES: No LE edema  NERVOUS SYSTEM:  Baseline mental status non-verbal

## 2024-04-16 NOTE — CONSULT NOTE ADULT - SUBJECTIVE AND OBJECTIVE BOX
Patient is a 58y old  Female who presents with a chief complaint of Pneumonia (16 Apr 2024 00:34)    Pt is a 58F w/ PMHx Down Syndrome, nonverbal at baseline, Hypothyroidism, Cerebral palsy and Seizure Disorders presents to the ED from \[facility]\"" for respiratory distress and high grade fever. Of note patient recently DC on 04/08/2024 with prolonged 3 month hospital course for similar presentation    On admission vitals: BP 74/55  RR 22 T103F sat 84% on RA placed on Bipap sat 100% on my examination    Labs WBC 12    Patient found to be septic on admission. Admitted to SDU for management of acute respiratory distress 2/2 CAP/Aspiration PNA.  (16 Apr 2024 00:34) Called to evaluate, this am on NIV      PAST MEDICAL & SURGICAL HISTORY:  Down syndrome      Osteoporosis      Mild anemia      Neuropathy      S/P debridement  of R hip on 3/2/21          SOCIAL HX:   Smoking -    FAMILY HISTORY: UTO      REVIEW OF SYSTEMS SEE HPI    Allergies    No Known Allergies    Intolerances        acetaminophen     Tablet .. 650 milliGRAM(s) Oral every 6 hours PRN  aluminum hydroxide/magnesium hydroxide/simethicone Suspension 30 milliLiter(s) Oral every 4 hours PRN  AQUAPHOR (petrolatum Ointment) 1 Application(s) Topical two times a day  artificial  tears Solution 1 Drop(s) Both EYES two times a day  calcium carbonate   1250 mG (OsCal) 1 Tablet(s) Oral daily  enoxaparin Injectable 30 milliGRAM(s) SubCutaneous every 24 hours  famotidine    Tablet 20 milliGRAM(s) Oral two times a day  gabapentin 300 milliGRAM(s) Oral two times a day  levETIRAcetam  Solution 750 milliGRAM(s) Oral two times a day  melatonin 3 milliGRAM(s) Oral at bedtime PRN  meropenem  IVPB 1000 milliGRAM(s) IV Intermittent every 8 hours  midodrine 10 milliGRAM(s) Oral every 8 hours  ondansetron Injectable 4 milliGRAM(s) IV Push every 8 hours PRN  raloxifene 60 milliGRAM(s) Oral daily  senna 2 Tablet(s) Oral at bedtime  tiotropium 2.5 MICROgram(s) Inhaler 2 Puff(s) Inhalation daily  vancomycin  IVPB 750 milliGRAM(s) IV Intermittent every 12 hours  : Home Meds:      PHYSICAL EXAM    ICU Vital Signs Last 24 Hrs  T(C): 39.3 (16 Apr 2024 06:31), Max: 40.2 (15 Apr 2024 17:10)  T(F): 102.8 (16 Apr 2024 06:31), Max: 104.4 (15 Apr 2024 17:10)  HR: 122 (16 Apr 2024 06:31) (97 - 138)  BP: 111/58 (16 Apr 2024 06:31) (74/55 - 120/55)  BP(mean): 77 (16 Apr 2024 06:31) (67 - 77)  RR: 22 (16 Apr 2024 06:31) (18 - 22)  SpO2: 100% (16 Apr 2024 06:31) (84% - 100%)    O2 Parameters below as of 16 Apr 2024 06:31  Patient On (Oxygen Delivery Method): BiPAP/CPAP    O2 Concentration (%): 60        General: Ill looking  Lungs: Bilateral rhonchi  Cardiovascular: HARPREET 2.6  Abdomen: Soft, Positive BS  Extremities: No clubbing  Neurological: Non focal         LABS:                          11.0   12.25 )-----------( 341      ( 15 Apr 2024 17:58 )             35.5                                               04-15    138  |  98  |  12  ----------------------------<  144<H>  4.6   |  26  |  0.7    Ca    9.0      15 Apr 2024 17:58    TPro  6.6  /  Alb  3.2<L>  /  TBili  0.5  /  DBili  x   /  AST  25  /  ALT  14  /  AlkPhos  106  04-15      PT/INR - ( 15 Apr 2024 17:58 )   PT: 13.80 sec;   INR: 1.21 ratio         PTT - ( 15 Apr 2024 17:58 )  PTT:29.9 sec                                       Urinalysis Basic - ( 15 Apr 2024 17:58 )    Color: x / Appearance: x / SG: x / pH: x  Gluc: 144 mg/dL / Ketone: x  / Bili: x / Urobili: x   Blood: x / Protein: x / Nitrite: x   Leuk Esterase: x / RBC: x / WBC x   Sq Epi: x / Non Sq Epi: x / Bacteria: x                                                  LIVER FUNCTIONS - ( 15 Apr 2024 17:58 )  Alb: 3.2 g/dL / Pro: 6.6 g/dL / ALK PHOS: 106 U/L / ALT: 14 U/L / AST: 25 U/L / GGT: x                                                                                                                                  MEDICATIONS  (STANDING):  AQUAPHOR (petrolatum Ointment) 1 Application(s) Topical two times a day  artificial  tears Solution 1 Drop(s) Both EYES two times a day  calcium carbonate   1250 mG (OsCal) 1 Tablet(s) Oral daily  enoxaparin Injectable 30 milliGRAM(s) SubCutaneous every 24 hours  famotidine    Tablet 20 milliGRAM(s) Oral two times a day  gabapentin 300 milliGRAM(s) Oral two times a day  levETIRAcetam  Solution 750 milliGRAM(s) Oral two times a day  meropenem  IVPB 1000 milliGRAM(s) IV Intermittent every 8 hours  midodrine 10 milliGRAM(s) Oral every 8 hours  raloxifene 60 milliGRAM(s) Oral daily  senna 2 Tablet(s) Oral at bedtime  tiotropium 2.5 MICROgram(s) Inhaler 2 Puff(s) Inhalation daily  vancomycin  IVPB 750 milliGRAM(s) IV Intermittent every 12 hours    MEDICATIONS  (PRN):  acetaminophen     Tablet .. 650 milliGRAM(s) Oral every 6 hours PRN Temp greater or equal to 38C (100.4F), Mild Pain (1 - 3)  aluminum hydroxide/magnesium hydroxide/simethicone Suspension 30 milliLiter(s) Oral every 4 hours PRN Dyspepsia  melatonin 3 milliGRAM(s) Oral at bedtime PRN Insomnia  ondansetron Injectable 4 milliGRAM(s) IV Push every 8 hours PRN Nausea and/or Vomiting      CXR noted

## 2024-04-16 NOTE — CONSULT NOTE ADULT - ATTENDING COMMENTS
Ms Linton is a 58 MARÍA w a PMH of Trisomy 21, a recent admission [id number] for RSV PNA w MICU stay (never intubated), nonverbal at baseline, hypotension, cerebral palsey w seizure disorder, ESBL isolated from ulcer of rt foot 11/23 BIBEMS from [facility] for respiratory distress and high fever. Patient SOA w , RR 22 and febrile to 102.8 rectally. Patient also hypotensive to 82/51. Labs notable for leukocytosis of 12.25 w 3.6% bands, large platelets and a compensated Respiratory acidosis. Imaging notable for a Rt Mid lobe ground glass opacification. Patient MRSA Negative w RVP negative for COVID, RSV and Flu.     IMPRESSION/RECOMMENDATIONS  Severe Sepsis ( SIRS with infection with end organ damage.  : Temp 102.8 , /m, , RR 22/m, , WBC 12.3 with metabolic encephalopathy   Immunosuppression could result in poor clinical outcome.   Acute  illness  which poses a threat to life or bodily function without treatment   Cannot r/o RML PNA secondary to aspiration  Was colonized with ESBL strain in feet wounds  Nares ORSA NG  COVID 19/ Influenza/ RSV NG.   WBC 12.2 with 3.6 % bands  Feet with pressure related ulcers. No abscess/cellulitis    -Urine for strep pneumonia/legionella antigen  -Sputum gm stain, cultures  -BCx   -Off loading to prevent pressure sores and preventive measures to avoid aspiration   - DC vancomycin  -Meropenem 1 gm iv q8h

## 2024-04-16 NOTE — CONSULT NOTE ADULT - ASSESSMENT
IMPRESSION:    Acute hypoxemic respiratory failure  PNA possible aspiration/ recurrent admission)  Sepsis POA  HO DVT  HO GI bleed  HO OM  HO recent duodenal perforation   HO polymicrobial bacteremia   H/o CP, DS  H/o seizures    PLAN:    CNS:  Avoid CNS Depressant, AED.    HEENT: Oral care. Eye drops.    PULMONARY: HOB at 45 degrees.  Aspiration precaution. Wean FiO2 Keep spo2 92 TO 96%. CHEST PT. Nebs q6 .    CARDIOVASCULAR: Keep MAP more than 60. Avoid overload    GI: Protonix. npo    INFECTIOUS DISEASE:  ABX , pancx, fungitell, procal    HEMATOLOGICAL:  DVT px, Monitor CBC. LE doppler    ENDOCRINE:  Follow up FS.  Insulin protocol if needed.    MUSCULOSKELETAL: Bedrest.  Off loading.  Wound care.    Prognosis very poor.  Palliative care following  dnr/i,   sdu

## 2024-04-17 LAB
-  COAGULASE NEGATIVE STAPHYLOCOCCUS: SIGNIFICANT CHANGE UP
A1C WITH ESTIMATED AVERAGE GLUCOSE RESULT: 5 % — SIGNIFICANT CHANGE UP (ref 4–5.6)
ANION GAP SERPL CALC-SCNC: 12 MMOL/L — SIGNIFICANT CHANGE UP (ref 7–14)
BASOPHILS # BLD AUTO: 0.06 K/UL — SIGNIFICANT CHANGE UP (ref 0–0.2)
BASOPHILS NFR BLD AUTO: 0.3 % — SIGNIFICANT CHANGE UP (ref 0–1)
BUN SERPL-MCNC: 8 MG/DL — LOW (ref 10–20)
CALCIUM SERPL-MCNC: 8.3 MG/DL — LOW (ref 8.4–10.5)
CHLORIDE SERPL-SCNC: 100 MMOL/L — SIGNIFICANT CHANGE UP (ref 98–110)
CO2 SERPL-SCNC: 27 MMOL/L — SIGNIFICANT CHANGE UP (ref 17–32)
CREAT SERPL-MCNC: <0.5 MG/DL — LOW (ref 0.7–1.5)
CULTURE RESULTS: ABNORMAL
EGFR: 115 ML/MIN/1.73M2 — SIGNIFICANT CHANGE UP
EOSINOPHIL # BLD AUTO: 0.02 K/UL — SIGNIFICANT CHANGE UP (ref 0–0.7)
EOSINOPHIL NFR BLD AUTO: 0.1 % — SIGNIFICANT CHANGE UP (ref 0–8)
ESTIMATED AVERAGE GLUCOSE: 97 MG/DL — SIGNIFICANT CHANGE UP (ref 68–114)
GLUCOSE SERPL-MCNC: 167 MG/DL — HIGH (ref 70–99)
HCT VFR BLD CALC: 28.2 % — LOW (ref 37–47)
HGB BLD-MCNC: 8.7 G/DL — LOW (ref 12–16)
IMM GRANULOCYTES NFR BLD AUTO: 0.7 % — HIGH (ref 0.1–0.3)
LACTATE SERPL-SCNC: 1.6 MMOL/L — SIGNIFICANT CHANGE UP (ref 0.7–2)
LACTATE SERPL-SCNC: 2.4 MMOL/L — HIGH (ref 0.7–2)
LYMPHOCYTES # BLD AUTO: 0.76 K/UL — LOW (ref 1.2–3.4)
LYMPHOCYTES # BLD AUTO: 4.2 % — LOW (ref 20.5–51.1)
MAGNESIUM SERPL-MCNC: 2 MG/DL — SIGNIFICANT CHANGE UP (ref 1.8–2.4)
MCHC RBC-ENTMCNC: 27.8 PG — SIGNIFICANT CHANGE UP (ref 27–31)
MCHC RBC-ENTMCNC: 30.9 G/DL — LOW (ref 32–37)
MCV RBC AUTO: 90.1 FL — SIGNIFICANT CHANGE UP (ref 81–99)
METHOD TYPE: SIGNIFICANT CHANGE UP
MONOCYTES # BLD AUTO: 0.67 K/UL — HIGH (ref 0.1–0.6)
MONOCYTES NFR BLD AUTO: 3.7 % — SIGNIFICANT CHANGE UP (ref 1.7–9.3)
NEUTROPHILS # BLD AUTO: 16.35 K/UL — HIGH (ref 1.4–6.5)
NEUTROPHILS NFR BLD AUTO: 91 % — HIGH (ref 42.2–75.2)
NRBC # BLD: 0 /100 WBCS — SIGNIFICANT CHANGE UP (ref 0–0)
ORGANISM # SPEC MICROSCOPIC CNT: ABNORMAL
ORGANISM # SPEC MICROSCOPIC CNT: SIGNIFICANT CHANGE UP
PLATELET # BLD AUTO: 302 K/UL — SIGNIFICANT CHANGE UP (ref 130–400)
PMV BLD: 10.2 FL — SIGNIFICANT CHANGE UP (ref 7.4–10.4)
POTASSIUM SERPL-MCNC: 3.8 MMOL/L — SIGNIFICANT CHANGE UP (ref 3.5–5)
POTASSIUM SERPL-SCNC: 3.8 MMOL/L — SIGNIFICANT CHANGE UP (ref 3.5–5)
RAPID RVP RESULT: SIGNIFICANT CHANGE UP
RBC # BLD: 3.13 M/UL — LOW (ref 4.2–5.4)
RBC # FLD: 22.3 % — HIGH (ref 11.5–14.5)
SARS-COV-2 RNA SPEC QL NAA+PROBE: SIGNIFICANT CHANGE UP
SODIUM SERPL-SCNC: 139 MMOL/L — SIGNIFICANT CHANGE UP (ref 135–146)
SPECIMEN SOURCE: SIGNIFICANT CHANGE UP
WBC # BLD: 17.98 K/UL — HIGH (ref 4.8–10.8)
WBC # FLD AUTO: 17.98 K/UL — HIGH (ref 4.8–10.8)

## 2024-04-17 PROCEDURE — 99233 SBSQ HOSP IP/OBS HIGH 50: CPT

## 2024-04-17 PROCEDURE — 99291 CRITICAL CARE FIRST HOUR: CPT

## 2024-04-17 RX ORDER — SCOPALAMINE 1 MG/3D
1 PATCH, EXTENDED RELEASE TRANSDERMAL
Refills: 0 | Status: DISCONTINUED | OUTPATIENT
Start: 2024-04-17 | End: 2024-05-29

## 2024-04-17 RX ORDER — SODIUM CHLORIDE 9 MG/ML
1000 INJECTION, SOLUTION INTRAVENOUS ONCE
Refills: 0 | Status: COMPLETED | OUTPATIENT
Start: 2024-04-17 | End: 2024-04-17

## 2024-04-17 RX ORDER — SODIUM CHLORIDE 9 MG/ML
500 INJECTION, SOLUTION INTRAVENOUS ONCE
Refills: 0 | Status: COMPLETED | OUTPATIENT
Start: 2024-04-17 | End: 2024-04-17

## 2024-04-17 RX ADMIN — MIDODRINE HYDROCHLORIDE 10 MILLIGRAM(S): 2.5 TABLET ORAL at 05:39

## 2024-04-17 RX ADMIN — Medication 3 MILLILITER(S): at 20:00

## 2024-04-17 RX ADMIN — MEROPENEM 100 MILLIGRAM(S): 1 INJECTION INTRAVENOUS at 15:35

## 2024-04-17 RX ADMIN — GABAPENTIN 300 MILLIGRAM(S): 400 CAPSULE ORAL at 05:39

## 2024-04-17 RX ADMIN — Medication 4.01 MICROGRAM(S)/KG/MIN: at 11:42

## 2024-04-17 RX ADMIN — GABAPENTIN 300 MILLIGRAM(S): 400 CAPSULE ORAL at 18:27

## 2024-04-17 RX ADMIN — LEVETIRACETAM 750 MILLIGRAM(S): 250 TABLET, FILM COATED ORAL at 05:40

## 2024-04-17 RX ADMIN — MIDODRINE HYDROCHLORIDE 10 MILLIGRAM(S): 2.5 TABLET ORAL at 22:04

## 2024-04-17 RX ADMIN — Medication 3 MILLILITER(S): at 07:28

## 2024-04-17 RX ADMIN — Medication 3 MILLILITER(S): at 02:15

## 2024-04-17 RX ADMIN — SENNA PLUS 2 TABLET(S): 8.6 TABLET ORAL at 22:04

## 2024-04-17 RX ADMIN — MEROPENEM 100 MILLIGRAM(S): 1 INJECTION INTRAVENOUS at 05:38

## 2024-04-17 RX ADMIN — LEVETIRACETAM 750 MILLIGRAM(S): 250 TABLET, FILM COATED ORAL at 18:28

## 2024-04-17 RX ADMIN — RALOXIFENE HYDROCHLORIDE 60 MILLIGRAM(S): 60 TABLET, COATED ORAL at 11:43

## 2024-04-17 RX ADMIN — Medication 1 TABLET(S): at 11:43

## 2024-04-17 RX ADMIN — SODIUM CHLORIDE 1000 MILLILITER(S): 9 INJECTION, SOLUTION INTRAVENOUS at 19:25

## 2024-04-17 RX ADMIN — Medication 650 MILLIGRAM(S): at 09:27

## 2024-04-17 RX ADMIN — MIDODRINE HYDROCHLORIDE 10 MILLIGRAM(S): 2.5 TABLET ORAL at 15:35

## 2024-04-17 RX ADMIN — SODIUM CHLORIDE 1000 MILLILITER(S): 9 INJECTION, SOLUTION INTRAVENOUS at 09:25

## 2024-04-17 RX ADMIN — FAMOTIDINE 20 MILLIGRAM(S): 10 INJECTION INTRAVENOUS at 18:27

## 2024-04-17 RX ADMIN — Medication 1 APPLICATION(S): at 18:27

## 2024-04-17 RX ADMIN — MEROPENEM 100 MILLIGRAM(S): 1 INJECTION INTRAVENOUS at 22:03

## 2024-04-17 RX ADMIN — FAMOTIDINE 20 MILLIGRAM(S): 10 INJECTION INTRAVENOUS at 06:28

## 2024-04-17 RX ADMIN — Medication 1 APPLICATION(S): at 05:39

## 2024-04-17 RX ADMIN — ENOXAPARIN SODIUM 30 MILLIGRAM(S): 100 INJECTION SUBCUTANEOUS at 15:35

## 2024-04-17 RX ADMIN — Medication 1 DROP(S): at 05:39

## 2024-04-17 RX ADMIN — Medication 3 MILLILITER(S): at 15:35

## 2024-04-17 RX ADMIN — Medication 1 DROP(S): at 18:27

## 2024-04-17 NOTE — PROGRESS NOTE ADULT - SUBJECTIVE AND OBJECTIVE BOX
JERICHOTEA  58y, Female    All available historical data reviewed    OVERNIGHT EVENTS:  fevers  on NC  does not follow commands    ROS:  unable to obtain history secondary to patient's mental status     VITALS:  T(F): 100.6, Max: 101 (04-16-24 @ 23:46)  HR: 100  BP: 114/53  RR: 18Vital Signs Last 24 Hrs  T(C): 38.1 (17 Apr 2024 09:27), Max: 38.3 (16 Apr 2024 23:46)  T(F): 100.6 (17 Apr 2024 09:27), Max: 101 (16 Apr 2024 23:46)  HR: 100 (17 Apr 2024 07:31) (62 - 103)  BP: 114/53 (17 Apr 2024 07:31) (86/47 - 133/60)  BP(mean): 77 (17 Apr 2024 07:31) (68 - 80)  RR: 18 (17 Apr 2024 07:31) (18 - 19)  SpO2: 99% (17 Apr 2024 07:31) (96% - 100%)    Parameters below as of 17 Apr 2024 07:31  Patient On (Oxygen Delivery Method): nasal cannula  O2 Flow (L/min): 6      TESTS & MEASUREMENTS:                        8.7    17.98 )-----------( 302      ( 17 Apr 2024 06:47 )             28.2     04-17    139  |  100  |  8<L>  ----------------------------<  167<H>  3.8   |  27  |  <0.5<L>    Ca    8.3<L>      17 Apr 2024 06:47  Phos  2.8     04-16  Mg     2.0     04-17    TPro  5.4<L>  /  Alb  2.8<L>  /  TBili  0.4  /  DBili  x   /  AST  13  /  ALT  11  /  AlkPhos  88  04-16    LIVER FUNCTIONS - ( 16 Apr 2024 07:03 )  Alb: 2.8 g/dL / Pro: 5.4 g/dL / ALK PHOS: 88 U/L / ALT: 11 U/L / AST: 13 U/L / GGT: x             Culture - Blood (collected 04-15-24 @ 17:58)  Source: .Blood Blood-Peripheral  Gram Stain (04-16-24 @ 23:38):    Growth in aerobic bottle: Gram positive cocci in pairs  Preliminary Report (04-16-24 @ 23:38):    Growth in aerobic bottle: Gram positive cocci in pairs    Direct identification is available within approximately 3-5    hours either by Blood Panel Multiplexed PCR or Direct    MALDI-TOF. Details: [email or web address]  Organism: Blood Culture PCR (04-17-24 @ 01:11)  Organism: Blood Culture PCR (04-17-24 @ 01:11)      Method Type: PCR      -  Coagulase negative Staphylococcus: Detec    Culture - Blood (collected 04-15-24 @ 17:58)  Source: .Blood Blood-Peripheral  Preliminary Report (04-16-24 @ 23:02):    No growth at 24 hours      Urinalysis Basic - ( 17 Apr 2024 06:47 )    Color: x / Appearance: x / SG: x / pH: x  Gluc: 167 mg/dL / Ketone: x  / Bili: x / Urobili: x   Blood: x / Protein: x / Nitrite: x   Leuk Esterase: x / RBC: x / WBC x   Sq Epi: x / Non Sq Epi: x / Bacteria: x          RADIOLOGY & ADDITIONAL TESTS:  Personal review of radiological diagnostics performed  Echo and EKG results noted when applicable.     MEDICATIONS:  acetaminophen     Tablet .. 650 milliGRAM(s) Oral every 6 hours PRN  albuterol/ipratropium for Nebulization 3 milliLiter(s) Nebulizer every 6 hours  aluminum hydroxide/magnesium hydroxide/simethicone Suspension 30 milliLiter(s) Oral every 4 hours PRN  AQUAPHOR (petrolatum Ointment) 1 Application(s) Topical two times a day  artificial  tears Solution 1 Drop(s) Both EYES two times a day  calcium carbonate   1250 mG (OsCal) 1 Tablet(s) Oral daily  enoxaparin Injectable 30 milliGRAM(s) SubCutaneous every 24 hours  famotidine    Tablet 20 milliGRAM(s) Oral two times a day  gabapentin 300 milliGRAM(s) Oral two times a day  levETIRAcetam  Solution 750 milliGRAM(s) Oral two times a day  melatonin 3 milliGRAM(s) Oral at bedtime PRN  meropenem  IVPB 1000 milliGRAM(s) IV Intermittent every 8 hours  midodrine 10 milliGRAM(s) Oral every 8 hours  norepinephrine Infusion 0.05 MICROgram(s)/kG/Min IV Continuous <Continuous>  ondansetron Injectable 4 milliGRAM(s) IV Push every 8 hours PRN  raloxifene 60 milliGRAM(s) Oral daily  senna 2 Tablet(s) Oral at bedtime      ANTIBIOTICS:  meropenem  IVPB 1000 milliGRAM(s) IV Intermittent every 8 hours

## 2024-04-17 NOTE — ED ADULT NURSE REASSESSMENT NOTE - NS ED NURSE REASSESS COMMENT FT1
Received patient from AM RN at 1900, comfort measures maintained, IVs intact and patent. V/S stable. Patients aide is at bedside.

## 2024-04-17 NOTE — PROGRESS NOTE ADULT - ASSESSMENT
IMPRESSION:    Acute hypoxemic respiratory failure  PNA possible aspiration/ recurrent admission  Sepsis POA  HO DVT  HO GI bleed  HO OM  HO recent duodenal perforation   HO polymicrobial bacteremia   H/o CP, DS  H/o seizures    PLAN:    CNS:  Avoid CNS Depressant, AED.    HEENT: Oral care. Eye drops.    PULMONARY: HOB at 45 degrees.  Aspiration precaution. Wean FiO2 Keep spo2 92 TO 96%. CHEST PT. Nebs q6 . NC    CARDIOVASCULAR: Keep MAP more than 60. Avoid overload    GI: Protonix. npo    INFECTIOUS DISEASE:  ABX , pancx,     HEMATOLOGICAL:  DVT px, Monitor CBC. LE doppler    ENDOCRINE:  Follow up FS.  Insulin protocol if needed.    MUSCULOSKELETAL: Bedrest.  Off loading.  Wound care.    Prognosis very poor.  Palliative care following  dnr/i,   sdu

## 2024-04-17 NOTE — PROGRESS NOTE ADULT - SUBJECTIVE AND OBJECTIVE BOX
Over Night Events: events noted, ID noted/ blood cx reviewed fever    PHYSICAL EXAM    ICU Vital Signs Last 24 Hrs  T(C): 37.2 (17 Apr 2024 01:15), Max: 39.3 (16 Apr 2024 06:31)  T(F): 98.9 (17 Apr 2024 01:15), Max: 102.8 (16 Apr 2024 06:31)  HR: 96 (17 Apr 2024 02:15) (62 - 122)  BP: 111/58 (17 Apr 2024 02:15) (73/44 - 133/60)  BP(mean): 73 (17 Apr 2024 01:15) (53 - 80)  RR: 18 (17 Apr 2024 02:15) (18 - 22)  SpO2: 100% (17 Apr 2024 02:15) (96% - 100%)    O2 Parameters below as of 17 Apr 2024 02:15  Patient On (Oxygen Delivery Method): nasal cannula  O2 Flow (L/min): 6          General: ill looking  Lungs: Bilateral rhonchi  Cardiovascular: Regular   Abdomen: Soft, Positive BS  Extremities: No clubbing   Neurological: Non focal       04-16-24 @ 07:01  -  04-17-24 @ 05:53  --------------------------------------------------------  IN:    Enteral Tube Flush: 100 mL    Jevity 1.2: 240 mL  Total IN: 340 mL    OUT:  Total OUT: 0 mL    Total NET: 340 mL          LABS:                          8.8    12.82 )-----------( 245      ( 16 Apr 2024 07:03 )             29.3                                               04-16    138  |  100  |  7<L>  ----------------------------<  119<H>  4.2   |  25  |  <0.5<L>    Ca    8.6      16 Apr 2024 07:03  Phos  2.8     04-16  Mg     1.6     04-16    TPro  5.4<L>  /  Alb  2.8<L>  /  TBili  0.4  /  DBili  x   /  AST  13  /  ALT  11  /  AlkPhos  88  04-16      PT/INR - ( 15 Apr 2024 17:58 )   PT: 13.80 sec;   INR: 1.21 ratio         PTT - ( 15 Apr 2024 17:58 )  PTT:29.9 sec                                       Urinalysis Basic - ( 16 Apr 2024 07:03 )    Color: x / Appearance: x / SG: x / pH: x  Gluc: 119 mg/dL / Ketone: x  / Bili: x / Urobili: x   Blood: x / Protein: x / Nitrite: x   Leuk Esterase: x / RBC: x / WBC x   Sq Epi: x / Non Sq Epi: x / Bacteria: x                                                  LIVER FUNCTIONS - ( 16 Apr 2024 07:03 )  Alb: 2.8 g/dL / Pro: 5.4 g/dL / ALK PHOS: 88 U/L / ALT: 11 U/L / AST: 13 U/L / GGT: x                                                  Culture - Blood (collected 15 Apr 2024 17:58)  Source: .Blood Blood-Peripheral  Gram Stain (16 Apr 2024 23:38):    Growth in aerobic bottle: Gram positive cocci in pairs  Preliminary Report (16 Apr 2024 23:38):    Growth in aerobic bottle: Gram positive cocci in pairs    Direct identification is available within approximately 3-5    hours either by Blood Panel Multiplexed PCR or Direct    MALDI-TOF. Details: [email or web address]  Organism: Blood Culture PCR (17 Apr 2024 01:11)  Organism: Blood Culture PCR (17 Apr 2024 01:11)    Culture - Blood (collected 15 Apr 2024 17:58)  Source: .Blood Blood-Peripheral  Preliminary Report (16 Apr 2024 23:02):    No growth at 24 hours                                                                                           MEDICATIONS  (STANDING):  albuterol/ipratropium for Nebulization 3 milliLiter(s) Nebulizer every 6 hours  AQUAPHOR (petrolatum Ointment) 1 Application(s) Topical two times a day  artificial  tears Solution 1 Drop(s) Both EYES two times a day  calcium carbonate   1250 mG (OsCal) 1 Tablet(s) Oral daily  enoxaparin Injectable 30 milliGRAM(s) SubCutaneous every 24 hours  famotidine    Tablet 20 milliGRAM(s) Oral two times a day  gabapentin 300 milliGRAM(s) Oral two times a day  levETIRAcetam  Solution 750 milliGRAM(s) Oral two times a day  meropenem  IVPB 1000 milliGRAM(s) IV Intermittent every 8 hours  midodrine 10 milliGRAM(s) Oral every 8 hours  norepinephrine Infusion 0.05 MICROgram(s)/kG/Min (4.01 mL/Hr) IV Continuous <Continuous>  raloxifene 60 milliGRAM(s) Oral daily  senna 2 Tablet(s) Oral at bedtime    MEDICATIONS  (PRN):  acetaminophen     Tablet .. 650 milliGRAM(s) Oral every 6 hours PRN Temp greater or equal to 38C (100.4F), Mild Pain (1 - 3)  aluminum hydroxide/magnesium hydroxide/simethicone Suspension 30 milliLiter(s) Oral every 4 hours PRN Dyspepsia  melatonin 3 milliGRAM(s) Oral at bedtime PRN Insomnia  ondansetron Injectable 4 milliGRAM(s) IV Push every 8 hours PRN Nausea and/or Vomiting

## 2024-04-17 NOTE — PROGRESS NOTE ADULT - ASSESSMENT
57yo F w/ pmhx of Down's syndrome, cerebral palsy, seizure disorder presents from [facility] for respiratory distress. Admitted to SDU for severe sepsis and acute hypoxemic respiratory failure likely secondary to recurrent CAP.     #acute hypoxemic respiratory failure   #Severe sepsis (WBC 12.25, Tmax 104.4F, 84% RA; hypotensive initially SBP 70s + source)  #possible recurrent CAP   #chronic hypotension   - CXR: possible basilar opacities  - covid/RSV, MSRA, negative; Bl cx with contaminant   - C/w Midodrine and Levophed. Titrate off as tolerated, keep MAP >65  - c/w Alicia  - Scopolamine for secretions  - F/u bcx; fungitell (previously positive s/p caspo), Urine for strep pneumonia/legionella antigen  - F/u palliative eval     # B/l feet ulcers  - No abscess/cellulitis  - colonized with ESBL strain  -Off loading to prevent pressure sores and preventive measures to avoid aspiration     #seizure disorder  #Down syndrome  #Nonverbal bedbound  # PEG tube   - c/w home meds    DVT ppx: lovenox  Code status: DNR/DNI, trial of NIV  Diet: NPO w/ peg feeds   Dispo: from [facility], keep in SDU  Pending: clinical improvement, palliative eval, labs  57yo F w/ pmhx of Down's syndrome, cerebral palsy, seizure disorder presents from [facility] for respiratory distress. Admitted to SDU for severe sepsis and acute hypoxemic respiratory failure likely secondary to recurrent CAP.     #acute hypoxemic respiratory failure   #Severe sepsis (WBC 12.25, Tmax 104.4F, 84% RA; hypotensive initially SBP 70s + source)  #possible recurrent CAP   #chronic hypotension   - CXR: possible basilar opacities  - covid/RSV, MSRA, negative; Bl cx with contaminant   - C/w Midodrine and Levophed. Titrate off as tolerated, keep MAP >65  - c/w Alicia  - Scopolamine for secretions. aspiration precautions   - F/u bcx; fungitell (previously positive s/p caspo), Urine for strep pneumonia/legionella antigen  - F/u palliative eval     # B/l feet ulcers  - No abscess/cellulitis  - colonized with ESBL strain  -Off loading to prevent pressure sores     #seizure disorder  #Down syndrome  #Nonverbal bedbound  # PEG tube   - c/w home meds    DVT ppx: lovenox  Code status: DNR/DNI, trial of NIV  Diet: NPO w/ peg feeds   Dispo: from [facility], keep in SDU  Pending: clinical improvement, palliative eval, labs  59yo F w/ pmhx of Down's syndrome, cerebral palsy, seizure disorder presents from [facility] for respiratory distress. Admitted to SDU for severe sepsis and acute hypoxemic respiratory failure likely secondary to recurrent CAP.     #acute hypoxemic respiratory failure   #Severe sepsis with septic shock(WBC 12.25, Tmax 104.4F, 84% RA; hypotensive initially SBP 70s + source)  #possible recurrent CAP   #chronic hypotension   # metabolic encephalopathy  - CXR: possible basilar opacities  - covid/RSV, MSRA, negative; Bl cx with contaminant   - C/w Midodrine and Levophed. Titrate off as tolerated, keep MAP >65  - c/w Alicia  - Scopolamine for secretions. aspiration precautions   - F/u bcx; fungitell (previously positive s/p caspo), Urine for strep pneumonia/legionella antigen  - F/u palliative eval     # B/l feet ulcers  - No abscess/cellulitis  - colonized with ESBL strain  -Off loading to prevent pressure sores     #seizure disorder  #Down syndrome  #Nonverbal bedbound  # PEG tube   - c/w home meds    DVT ppx: lovenox  Code status: DNR/DNI, trial of NIV  Diet: NPO w/ peg feeds   Dispo: from [facility], keep in SDU  Pending: clinical improvement, palliative eval, labs  59yo F w/ pmhx of Down's syndrome, cerebral palsy, seizure disorder presents from [facility] for respiratory distress. Admitted to SDU for severe sepsis and acute hypoxemic respiratory failure likely secondary to recurrent CAP.     #acute hypoxemic respiratory failure   #Severe sepsis with septic shock(WBC 12.25, Tmax 104.4F, 84% RA; hypotensive initially SBP 70s + source)  #possible recurrent CAP   #chronic hypotension   # metabolic encephalopathy  - CXR: possible basilar opacities  - covid/RSV, MSRA, negative; Bl cx with contaminant   - C/w Midodrine  - S/p fluid bolus to wean off Levophed as tolerated. keep MAP >65  - c/w Alicia  - Scopolamine for secretions. aspiration precautions   - F/u bcx; fungitell (previously positive s/p caspo), Urine for strep pneumonia/legionella antigen  - F/u palliative eval     # B/l feet ulcers  - No abscess/cellulitis  - colonized with ESBL strain  -Off loading to prevent pressure sores     #seizure disorder  #Down syndrome  #Nonverbal bedbound  # PEG tube   - c/w home meds    DVT ppx: lovenox  Code status: DNR/DNI, trial of NIV  Diet: NPO w/ peg feeds   Dispo: from [facility], keep in SDU  Pending: clinical improvement, palliative eval, labs

## 2024-04-17 NOTE — PROGRESS NOTE ADULT - SUBJECTIVE AND OBJECTIVE BOX
Hospital Day:  2d    Subjective:    Patient is a 58y old  Female who presents with a chief complaint of Pneumonia. AAOx0, aid at bedside, gurgling, does not appear to be in acute distress      Past Medical Hx:   Down syndrome    Osteoporosis    Mild anemia    Neuropathy      Past Sx:  No significant past surgical history    S/P debridement      Allergies:  No Known Allergies    Current Meds:   Standng Meds:  albuterol/ipratropium for Nebulization 3 milliLiter(s) Nebulizer every 6 hours  AQUAPHOR (petrolatum Ointment) 1 Application(s) Topical two times a day  artificial  tears Solution 1 Drop(s) Both EYES two times a day  calcium carbonate   1250 mG (OsCal) 1 Tablet(s) Oral daily  enoxaparin Injectable 30 milliGRAM(s) SubCutaneous every 24 hours  famotidine    Tablet 20 milliGRAM(s) Oral two times a day  gabapentin 300 milliGRAM(s) Oral two times a day  levETIRAcetam  Solution 750 milliGRAM(s) Oral two times a day  meropenem  IVPB 1000 milliGRAM(s) IV Intermittent every 8 hours  midodrine 10 milliGRAM(s) Oral every 8 hours  norepinephrine Infusion 0.05 MICROgram(s)/kG/Min (4.01 mL/Hr) IV Continuous <Continuous>  raloxifene 60 milliGRAM(s) Oral daily  senna 2 Tablet(s) Oral at bedtime    PRN Meds:  acetaminophen     Tablet .. 650 milliGRAM(s) Oral every 6 hours PRN Temp greater or equal to 38C (100.4F), Mild Pain (1 - 3)  aluminum hydroxide/magnesium hydroxide/simethicone Suspension 30 milliLiter(s) Oral every 4 hours PRN Dyspepsia  melatonin 3 milliGRAM(s) Oral at bedtime PRN Insomnia  ondansetron Injectable 4 milliGRAM(s) IV Push every 8 hours PRN Nausea and/or Vomiting    HOME MEDICATIONS:  gabapentin 300 mg oral tablet: 1 cap(s) by PEG tube every 12 hours  ipratropium-albuterol 0.5 mg-2.5 mg/3 mL inhalation solution: 3 milliliter(s) inhaled every 6 hours as needed for  shortness of breath and/or wheezing  Melatonin 3 mg oral tablet: 1 tab(s) by PEG tube once a day (at bedtime)  petrolatum topical ointment: 1 Apply topically to affected area 2 times a day  raloxifene 60 mg oral tablet: 1 tab(s) by PEG tube once a day  senna leaf extract oral tablet: 2 tab(s) orally once a day (at bedtime)      Vital Signs:   T(F): 99.5 (04-17-24 @ 15:35), Max: 101 (04-16-24 @ 23:46)  HR: 75 (04-17-24 @ 15:35) (62 - 103)  BP: 104/55 (04-17-24 @ 15:35) (86/47 - 133/60)  RR: 18 (04-17-24 @ 15:35) (18 - 19)  SpO2: 98% (04-17-24 @ 15:35) (96% - 100%)      04-16-24 @ 07:01  -  04-17-24 @ 07:00  --------------------------------------------------------  IN: 340 mL / OUT: 0 mL / NET: 340 mL        Physical Exam:   GENERAL: obese  HEENT: does not track   CHEST/LUNG: limited due to pt's ms, but gurgling and rhonchi noted   HEART: Regular rate and rhythm; s1 s2 appreciated, No murmurs, rubs, or gallops  ABDOMEN: Soft, Nontender, Nondistended; Bowel sounds present  EXTREMITIES: No LE edema b/l, L toe amputated  SKIN: skin and toe with dermatitis   NERVOUS SYSTEM:  Alert & Oriented X0          Labs:                         8.7    17.98 )-----------( 302      ( 17 Apr 2024 06:47 )             28.2     Neutophil% 91.0, Lymphocyte% 4.2, Monocyte% 3.7, Bands% 0.7 04-17-24 @ 06:47    17 Apr 2024 06:47    139    |  100    |  8      ----------------------------<  167    3.8     |  27     |  <0.5     Ca    8.3        17 Apr 2024 06:47  Phos  2.8       16 Apr 2024 07:03  Mg     2.0       17 Apr 2024 06:47    TPro  5.4    /  Alb  2.8    /  TBili  0.4    /  DBili  x      /  AST  13     /  ALT  11     /  AlkPhos  88     16 Apr 2024 07:03                    Urinalysis Basic - ( 17 Apr 2024 06:47 )    Color: x / Appearance: x / SG: x / pH: x  Gluc: 167 mg/dL / Ketone: x  / Bili: x / Urobili: x   Blood: x / Protein: x / Nitrite: x   Leuk Esterase: x / RBC: x / WBC x   Sq Epi: x / Non Sq Epi: x / Bacteria: x          Culture - Blood (collected 04-15-24 @ 17:58)  Source: .Blood Blood-Peripheral  Gram Stain (04-16-24 @ 23:38):    Growth in aerobic bottle: Gram positive cocci in pairs  Preliminary Report (04-16-24 @ 23:38):    Growth in aerobic bottle: Gram positive cocci in pairs    Direct identification is available within approximately 3-5    hours either by Blood Panel Multiplexed PCR or Direct    MALDI-TOF. Details: [email or web address]  Organism: Blood Culture PCR (04-17-24 @ 01:11)  Organism: Blood Culture PCR (04-17-24 @ 01:11)      -  Coagulase negative Staphylococcus: Detec      Method Type: PCR    Culture - Blood (collected 04-15-24 @ 17:58)  Source: .Blood Blood-Peripheral  Preliminary Report (04-16-24 @ 23:02):    No growth at 24 hours

## 2024-04-17 NOTE — PROGRESS NOTE ADULT - ASSESSMENT
58 MARÍA w a PMH of Trisomy 21, a recent admission [id number] for RSV PNA w MICU stay (never intubated), nonverbal at baseline, hypotension, cerebral palsey w seizure disorder, ESBL isolated from ulcer of rt foot 11[facility]. Patient SOA w , RR 22 and febrile to 102.8 rectally. Patient also hypotensive to 82/51. Labs notable for leukocytosis of 12.25 w 3.6% bands, large platelets and a compensated Respiratory acidosis. Imaging notable for a Rt Mid lobe ground glass opacification. Patient MRSA Negative w RVP negative for COVID, RSV and Flu.     IMPRESSION/RECOMMENDATIONS  Severe Sepsis ( SIRS with infection with end organ damage.  : Temp 102.8 , /m, , RR 22/m, , WBC 12.3 with metabolic encephalopathy   Immunosuppression could result in poor clinical outcome.   Acute  illness  which poses a threat to life or bodily function without treatment   Cannot r/o RML PNA secondary to aspiration  Nares ORSa NG  RVP NG  4/15 Bcx 1/2 CoNS > not a true pathogen  Was colonized with ESBL strain in feet wounds  Nares ORSA NG  COVID 19/ Influenza/ RSV NG.   WBC 17.9  Feet with pressure related ulcers. No abscess/cellulitis    -Urine for strep pneumonia/legionella antigen  -Sputum gm stain, cultures  -Off loading to prevent pressure sores and preventive measures to avoid aspiration   -Meropenem 1 gm iv q8h .

## 2024-04-18 DIAGNOSIS — Z71.89 OTHER SPECIFIED COUNSELING: ICD-10-CM

## 2024-04-18 DIAGNOSIS — A41.9 SEPSIS, UNSPECIFIED ORGANISM: ICD-10-CM

## 2024-04-18 DIAGNOSIS — J96.01 ACUTE RESPIRATORY FAILURE WITH HYPOXIA: ICD-10-CM

## 2024-04-18 DIAGNOSIS — Z51.5 ENCOUNTER FOR PALLIATIVE CARE: ICD-10-CM

## 2024-04-18 LAB
ANION GAP SERPL CALC-SCNC: 10 MMOL/L — SIGNIFICANT CHANGE UP (ref 7–14)
BASOPHILS # BLD AUTO: 0.05 K/UL — SIGNIFICANT CHANGE UP (ref 0–0.2)
BASOPHILS NFR BLD AUTO: 0.5 % — SIGNIFICANT CHANGE UP (ref 0–1)
BUN SERPL-MCNC: 6 MG/DL — LOW (ref 10–20)
CALCIUM SERPL-MCNC: 8.5 MG/DL — SIGNIFICANT CHANGE UP (ref 8.4–10.5)
CHLORIDE SERPL-SCNC: 100 MMOL/L — SIGNIFICANT CHANGE UP (ref 98–110)
CO2 SERPL-SCNC: 29 MMOL/L — SIGNIFICANT CHANGE UP (ref 17–32)
CREAT SERPL-MCNC: <0.5 MG/DL — LOW (ref 0.7–1.5)
EGFR: 124 ML/MIN/1.73M2 — SIGNIFICANT CHANGE UP
EOSINOPHIL # BLD AUTO: 0.13 K/UL — SIGNIFICANT CHANGE UP (ref 0–0.7)
EOSINOPHIL NFR BLD AUTO: 1.3 % — SIGNIFICANT CHANGE UP (ref 0–8)
GLUCOSE BLDC GLUCOMTR-MCNC: 105 MG/DL — HIGH (ref 70–99)
GLUCOSE BLDC GLUCOMTR-MCNC: 139 MG/DL — HIGH (ref 70–99)
GLUCOSE BLDC GLUCOMTR-MCNC: 79 MG/DL — SIGNIFICANT CHANGE UP (ref 70–99)
GLUCOSE BLDC GLUCOMTR-MCNC: 96 MG/DL — SIGNIFICANT CHANGE UP (ref 70–99)
GLUCOSE SERPL-MCNC: 100 MG/DL — HIGH (ref 70–99)
HCT VFR BLD CALC: 28.5 % — LOW (ref 37–47)
HGB BLD-MCNC: 8.9 G/DL — LOW (ref 12–16)
IMM GRANULOCYTES NFR BLD AUTO: 0.7 % — HIGH (ref 0.1–0.3)
LYMPHOCYTES # BLD AUTO: 1.41 K/UL — SIGNIFICANT CHANGE UP (ref 1.2–3.4)
LYMPHOCYTES # BLD AUTO: 13.9 % — LOW (ref 20.5–51.1)
MAGNESIUM SERPL-MCNC: 2 MG/DL — SIGNIFICANT CHANGE UP (ref 1.8–2.4)
MCHC RBC-ENTMCNC: 28 PG — SIGNIFICANT CHANGE UP (ref 27–31)
MCHC RBC-ENTMCNC: 31.2 G/DL — LOW (ref 32–37)
MCV RBC AUTO: 89.6 FL — SIGNIFICANT CHANGE UP (ref 81–99)
MONOCYTES # BLD AUTO: 0.61 K/UL — HIGH (ref 0.1–0.6)
MONOCYTES NFR BLD AUTO: 6 % — SIGNIFICANT CHANGE UP (ref 1.7–9.3)
NEUTROPHILS # BLD AUTO: 7.86 K/UL — HIGH (ref 1.4–6.5)
NEUTROPHILS NFR BLD AUTO: 77.6 % — HIGH (ref 42.2–75.2)
NRBC # BLD: 0 /100 WBCS — SIGNIFICANT CHANGE UP (ref 0–0)
PLATELET # BLD AUTO: 322 K/UL — SIGNIFICANT CHANGE UP (ref 130–400)
PMV BLD: 10.4 FL — SIGNIFICANT CHANGE UP (ref 7.4–10.4)
POTASSIUM SERPL-MCNC: 4.3 MMOL/L — SIGNIFICANT CHANGE UP (ref 3.5–5)
POTASSIUM SERPL-SCNC: 4.3 MMOL/L — SIGNIFICANT CHANGE UP (ref 3.5–5)
RBC # BLD: 3.18 M/UL — LOW (ref 4.2–5.4)
RBC # FLD: 22.1 % — HIGH (ref 11.5–14.5)
SODIUM SERPL-SCNC: 139 MMOL/L — SIGNIFICANT CHANGE UP (ref 135–146)
WBC # BLD: 10.13 K/UL — SIGNIFICANT CHANGE UP (ref 4.8–10.8)
WBC # FLD AUTO: 10.13 K/UL — SIGNIFICANT CHANGE UP (ref 4.8–10.8)

## 2024-04-18 PROCEDURE — 99232 SBSQ HOSP IP/OBS MODERATE 35: CPT

## 2024-04-18 PROCEDURE — 99233 SBSQ HOSP IP/OBS HIGH 50: CPT

## 2024-04-18 PROCEDURE — 99223 1ST HOSP IP/OBS HIGH 75: CPT

## 2024-04-18 PROCEDURE — 74018 RADEX ABDOMEN 1 VIEW: CPT | Mod: 26

## 2024-04-18 RX ORDER — GABAPENTIN 400 MG/1
300 CAPSULE ORAL
Refills: 0 | Status: DISCONTINUED | OUTPATIENT
Start: 2024-04-18 | End: 2024-05-29

## 2024-04-18 RX ORDER — HYDROCORTISONE 1 %
1 OINTMENT (GRAM) TOPICAL DAILY
Refills: 0 | Status: DISCONTINUED | OUTPATIENT
Start: 2024-04-18 | End: 2024-05-17

## 2024-04-18 RX ORDER — SODIUM CHLORIDE 9 MG/ML
500 INJECTION, SOLUTION INTRAVENOUS ONCE
Refills: 0 | Status: COMPLETED | OUTPATIENT
Start: 2024-04-18 | End: 2024-04-18

## 2024-04-18 RX ADMIN — MEROPENEM 100 MILLIGRAM(S): 1 INJECTION INTRAVENOUS at 05:54

## 2024-04-18 RX ADMIN — Medication 1 APPLICATION(S): at 18:04

## 2024-04-18 RX ADMIN — ENOXAPARIN SODIUM 30 MILLIGRAM(S): 100 INJECTION SUBCUTANEOUS at 13:10

## 2024-04-18 RX ADMIN — SCOPALAMINE 1 PATCH: 1 PATCH, EXTENDED RELEASE TRANSDERMAL at 19:00

## 2024-04-18 RX ADMIN — SCOPALAMINE 1 PATCH: 1 PATCH, EXTENDED RELEASE TRANSDERMAL at 08:00

## 2024-04-18 RX ADMIN — Medication 1 APPLICATION(S): at 05:52

## 2024-04-18 RX ADMIN — GABAPENTIN 300 MILLIGRAM(S): 400 CAPSULE ORAL at 05:52

## 2024-04-18 RX ADMIN — SENNA PLUS 2 TABLET(S): 8.6 TABLET ORAL at 23:10

## 2024-04-18 RX ADMIN — MIDODRINE HYDROCHLORIDE 10 MILLIGRAM(S): 2.5 TABLET ORAL at 13:24

## 2024-04-18 RX ADMIN — LEVETIRACETAM 750 MILLIGRAM(S): 250 TABLET, FILM COATED ORAL at 17:33

## 2024-04-18 RX ADMIN — GABAPENTIN 300 MILLIGRAM(S): 400 CAPSULE ORAL at 18:04

## 2024-04-18 RX ADMIN — MEROPENEM 100 MILLIGRAM(S): 1 INJECTION INTRAVENOUS at 23:10

## 2024-04-18 RX ADMIN — Medication 1 APPLICATION(S): at 17:33

## 2024-04-18 RX ADMIN — MEROPENEM 100 MILLIGRAM(S): 1 INJECTION INTRAVENOUS at 13:25

## 2024-04-18 RX ADMIN — Medication 3 MILLILITER(S): at 09:25

## 2024-04-18 RX ADMIN — LEVETIRACETAM 750 MILLIGRAM(S): 250 TABLET, FILM COATED ORAL at 05:52

## 2024-04-18 RX ADMIN — SODIUM CHLORIDE 500 MILLILITER(S): 9 INJECTION, SOLUTION INTRAVENOUS at 10:44

## 2024-04-18 RX ADMIN — Medication 3 MILLILITER(S): at 14:53

## 2024-04-18 RX ADMIN — FAMOTIDINE 20 MILLIGRAM(S): 10 INJECTION INTRAVENOUS at 17:33

## 2024-04-18 RX ADMIN — Medication 3 MILLILITER(S): at 19:27

## 2024-04-18 RX ADMIN — Medication 1 DROP(S): at 05:52

## 2024-04-18 RX ADMIN — SCOPALAMINE 1 PATCH: 1 PATCH, EXTENDED RELEASE TRANSDERMAL at 05:53

## 2024-04-18 RX ADMIN — FAMOTIDINE 20 MILLIGRAM(S): 10 INJECTION INTRAVENOUS at 05:52

## 2024-04-18 RX ADMIN — MIDODRINE HYDROCHLORIDE 10 MILLIGRAM(S): 2.5 TABLET ORAL at 05:52

## 2024-04-18 RX ADMIN — Medication 1 DROP(S): at 17:33

## 2024-04-18 RX ADMIN — MIDODRINE HYDROCHLORIDE 10 MILLIGRAM(S): 2.5 TABLET ORAL at 23:10

## 2024-04-18 NOTE — PATIENT PROFILE ADULT - NSPROMEDSBROUGHTTOHOSP_GEN_A_NUR
[de-identified] : Outpt sono with gallstones vs polyps.  RUQ symptoms consistent with colic.  Denies fevers, chills, nausea or vomiting.
no

## 2024-04-18 NOTE — CONSULT NOTE ADULT - PROBLEM SELECTOR RECOMMENDATION 3
- is currently DNR/DNI  - as patient is OPWDD, any withdrawal of care would require MHLS non-objection and MOLST checklist completion  - sepsis is a treatable condition at this stage, but if it becomes an end-stage infectious process, may warrant further consideration with MHLS for limitations to care

## 2024-04-18 NOTE — PROGRESS NOTE ADULT - ASSESSMENT
58 MARÍA w a PMH of Trisomy 21, a recent admission [id number] for RSV PNA w MICU stay (never intubated), nonverbal at baseline, hypotension, cerebral palsey w seizure disorder, ESBL isolated from ulcer of rt foot 11/23 BIBEMS from [facility] for respiratory distress and high fever. Patient SOA w , RR 22 and febrile to 102.8 rectally. Patient also hypotensive to 82/51. Labs notable for leukocytosis of 12.25 w 3.6% bands, large platelets and a compensated Respiratory acidosis. Imaging notable for a Rt Mid lobe ground glass opacification. Patient MRSA Negative w RVP negative for COVID, RSV and Flu.     IMPRESSION/RECOMMENDATIONS  Resolved severe Sepsis.  Metabolic encephalopathy   Immunosuppression could result in poor clinical outcome.   Acute  illness  which poses a threat to life or bodily function without treatment   On treatment for  RML PNA secondary to aspiration  Nares ORSA NG  RVP NG  4/15 Bcx 1/2 CoNS > not a true pathogen  Was colonized with ESBL strain in feet wounds  Nares ORSA NG  COVID 19/ Influenza/ RSV NG.   WBC 17.9>10.1  Feet with pressure related ulcers. No abscess/cellulitis    -Sputum gm stain, cultures  -Off loading to prevent pressure sores and preventive measures to avoid aspiration   -Meropenem 1 gm iv q8h for  total of 7 days    Please do not hesitate to recall ID if any questions arise either through [employer] or through [employer] teams

## 2024-04-18 NOTE — PROGRESS NOTE ADULT - ASSESSMENT
57yo F w/ pmhx of Down's syndrome, cerebral palsy, seizure disorder presents from [facility] for respiratory distress. Admitted to SDU for severe sepsis and acute hypoxemic respiratory failure likely secondary to recurrent CAP.     #acute hypoxemic respiratory failure   #Severe sepsis with septic shock(WBC 12.25, Tmax 104.4F, 84% RA; hypotensive initially SBP 70s + source)  #possible recurrent CAP   #chronic hypotension   # metabolic encephalopathy  - CXR: possible basilar opacities  - covid/RSV, MSRA, negative; Bl cx with contaminant   - C/w Midodrine  - S/p fluid bolus to wean off Levophed as tolerated. keep MAP >65  - c/w Alicia  - Scopolamine for secretions. aspiration precautions   - F/u bcx; fungitell (previously positive s/p caspo), Urine for strep pneumonia/legionella antigen  - F/u palliative eval     # B/l feet ulcers  - No abscess/cellulitis  - colonized with ESBL strain  -Off loading to prevent pressure sores     #seizure disorder  #Down syndrome  #Nonverbal bedbound  # PEG tube   - c/w home meds    DVT ppx: lovenox  Code status: DNR/DNI, trial of NIV  Diet: NPO w/ peg feeds   Dispo: from [facility], keep in SDU  Pending: clinical improvement, palliative eval, labs  59yo F w/ pmhx of Down's syndrome, cerebral palsy, seizure disorder presents from [facility] for respiratory distress. Admitted to SDU for severe sepsis and acute hypoxemic respiratory failure likely secondary to recurrent CAP.     #acute hypoxemic respiratory failure   #Severe sepsis with septic shock(WBC 12.25, Tmax 104.4F, 84% RA; hypotensive initially SBP 70s + source)  #possible recurrent CAP   #chronic hypotension   # metabolic encephalopathy  - CXR: possible basilar opacities  - covid/RSV, MSRA, negative; Bl cx with contaminant   - C/w Midodrine, now back on levophed. keep MAP >65  - c/w Alicia  - Scopolamine for secretions. aspiration precautions   - F/u bcx; fungitell (previously positive s/p caspo), Urine for strep pneumonia/legionella antigen  - F/u palliative eval   - fungitell improving     # B/l feet ulcers  - No abscess/cellulitis  - colonized with ESBL strain  -Off loading to prevent pressure sores     #seizure disorder  #Down syndrome  #Nonverbal bedbound  # PEG tube   - c/w home meds    DVT ppx: lovenox  Code status: DNR/DNI, trial of NIV  Diet: NPO w/ peg feeds   Dispo: from [facility], keep in SDU, sallie [facility]

## 2024-04-18 NOTE — PROGRESS NOTE ADULT - SUBJECTIVE AND OBJECTIVE BOX
TEA ECHAVARRIA  58y, Female    All available historical data reviewed    OVERNIGHT EVENTS:  no fevers    ROS:  unable to obtain history secondary to patient's mental status     VITALS:  T(F): 98.1, Max: 99.9 (04-17-24 @ 22:45)  HR: 77  BP: 101/51  RR: 16Vital Signs Last 24 Hrs  T(C): 36.7 (18 Apr 2024 07:00), Max: 37.7 (17 Apr 2024 22:45)  T(F): 98.1 (18 Apr 2024 07:00), Max: 99.9 (17 Apr 2024 22:45)  HR: 77 (18 Apr 2024 10:45) (64 - 115)  BP: 101/51 (18 Apr 2024 10:45) (83/52 - 133/68)  BP(mean): 70 (18 Apr 2024 10:45) (61 - 93)  RR: 16 (18 Apr 2024 08:00) (16 - 18)  SpO2: 99% (18 Apr 2024 10:45) (95% - 100%)    Parameters below as of 18 Apr 2024 08:00  Patient On (Oxygen Delivery Method): nasal cannula  O2 Flow (L/min): 3      TESTS & MEASUREMENTS:                        8.9    10.13 )-----------( 322      ( 18 Apr 2024 04:43 )             28.5     04-18    139  |  100  |  6<L>  ----------------------------<  100<H>  4.3   |  29  |  <0.5<L>    Ca    8.5      18 Apr 2024 04:43  Mg     2.0     04-18          Culture - Blood (collected 04-15-24 @ 17:58)  Source: .Blood Blood-Peripheral  Gram Stain (04-16-24 @ 23:38):    Growth in aerobic bottle: Gram positive cocci in pairs  Final Report (04-17-24 @ 19:45):    Growth in aerobic bottle: Staphylococcus capitis    Isolation of Coagulase negative Staphylococcus from single blood culture    sets may represent    contamination. Contact the Microbiology Department at [phone] if    susceptibility testing is    clinically indicated.    Direct identification is available within approximately 3-5    hours either by Blood Panel Multiplexed PCR or Direct    MALDI-TOF. Details: [email or web address]  Organism: Blood Culture PCR (04-17-24 @ 19:45)  Organism: Blood Culture PCR (04-17-24 @ 19:45)      -  Coagulase negative Staphylococcus: Detec      Method Type: PCR    Culture - Blood (collected 04-15-24 @ 17:58)  Source: .Blood Blood-Peripheral  Preliminary Report (04-17-24 @ 23:08):    No growth at 48 Hours      Urinalysis Basic - ( 18 Apr 2024 04:43 )    Color: x / Appearance: x / SG: x / pH: x  Gluc: 100 mg/dL / Ketone: x  / Bili: x / Urobili: x   Blood: x / Protein: x / Nitrite: x   Leuk Esterase: x / RBC: x / WBC x   Sq Epi: x / Non Sq Epi: x / Bacteria: x          RADIOLOGY & ADDITIONAL TESTS:  Personal review of radiological diagnostics performed  Echo and EKG results noted when applicable.     MEDICATIONS:  acetaminophen     Tablet .. 650 milliGRAM(s) Oral every 6 hours PRN  albuterol/ipratropium for Nebulization 3 milliLiter(s) Nebulizer every 6 hours  aluminum hydroxide/magnesium hydroxide/simethicone Suspension 30 milliLiter(s) Oral every 4 hours PRN  AQUAPHOR (petrolatum Ointment) 1 Application(s) Topical two times a day  artificial  tears Solution 1 Drop(s) Both EYES two times a day  calcium carbonate   1250 mG (OsCal) 1 Tablet(s) Oral daily  enoxaparin Injectable 30 milliGRAM(s) SubCutaneous every 24 hours  famotidine    Tablet 20 milliGRAM(s) Oral two times a day  gabapentin 300 milliGRAM(s) Oral two times a day  levETIRAcetam  Solution 750 milliGRAM(s) Oral two times a day  melatonin 3 milliGRAM(s) Oral at bedtime PRN  meropenem  IVPB 1000 milliGRAM(s) IV Intermittent every 8 hours  midodrine 10 milliGRAM(s) Oral every 8 hours  norepinephrine Infusion 0.05 MICROgram(s)/kG/Min IV Continuous <Continuous>  ondansetron Injectable 4 milliGRAM(s) IV Push every 8 hours PRN  raloxifene 60 milliGRAM(s) Oral daily  scopolamine 1 mG/72 Hr(s) Patch 1 Patch Transdermal every 72 hours  senna 2 Tablet(s) Oral at bedtime      ANTIBIOTICS:  meropenem  IVPB 1000 milliGRAM(s) IV Intermittent every 8 hours

## 2024-04-18 NOTE — CONSULT NOTE ADULT - PROBLEM SELECTOR RECOMMENDATION 4
- will follow  ______________  Wu Jenkins MD  Palliative Medicine  [facility]   of Geriatric and Palliative Medicine  [phone]

## 2024-04-18 NOTE — CONSULT NOTE ADULT - SUBJECTIVE AND OBJECTIVE BOX
HPI: 58F with PMH including Down's syndrome, cerebral palsy, and seizure disorder, here from [facility] with respiratory distress and acute respiratory failure, found to have septic shock from CAP on IV abx and pressors. Palliative consulted for GOC. DNR/DNI.       PERTINENT PM/SXH:   Down syndrome    Osteoporosis    Mild anemia    Neuropathy      No significant past surgical history    S/P debridement      FAMILY HISTORY:  no pertinent family history      SOCIAL HISTORY:   Significant other/partner[ ]  Children[ ]  Presybeterian/Spirituality:  Substance hx:  [ ]   Tobacco hx:  [ ]   Alcohol hx: [ ]   Living Situation: [ ]Home  [ ]Long term care  [ ]Rehab [X ]Other [facility]  Home Services: [ ] HHA [ ] Desi RN [ ] Hospice  Occupation:  Home Opioid hx:  [ ] Y [ ] N [ ] I-Stop Reference No:    ADVANCE DIRECTIVES:    [ ] Full Code [X ] DNR  MOLST  [ ]  Living Will  [ ]   DECISION MAKER(s):  [ ] Health Care Proxy(s)  [ ] Surrogate(s)  [ ] Guardian           [facility](s[facility]    BASELINE (I)ADL(s) (prior to admission):  Searcy: [ ]Total  [ ] Moderate [ ]Dependent  Palliative Performance Status Version 2:         %    http://npcrc.org/files/news/palliative_performance_scale_ppsv2.pdf    Allergies    No Known Allergies    Intolerances    MEDICATIONS  (STANDING):  albuterol/ipratropium for Nebulization 3 milliLiter(s) Nebulizer every 6 hours  AQUAPHOR (petrolatum Ointment) 1 Application(s) Topical two times a day  artificial  tears Solution 1 Drop(s) Both EYES two times a day  calcium carbonate   1250 mG (OsCal) 1 Tablet(s) Oral daily  enoxaparin Injectable 30 milliGRAM(s) SubCutaneous every 24 hours  famotidine    Tablet 20 milliGRAM(s) Oral two times a day  gabapentin 300 milliGRAM(s) Oral two times a day  hydrocortisone 1% Cream 1 Application(s) Topical daily  levETIRAcetam  Solution 750 milliGRAM(s) Oral two times a day  meropenem  IVPB 1000 milliGRAM(s) IV Intermittent every 8 hours  midodrine 10 milliGRAM(s) Oral every 8 hours  norepinephrine Infusion 0.05 MICROgram(s)/kG/Min (4.01 mL/Hr) IV Continuous <Continuous>  raloxifene 60 milliGRAM(s) Oral daily  scopolamine 1 mG/72 Hr(s) Patch 1 Patch Transdermal every 72 hours  senna 2 Tablet(s) Oral at bedtime    MEDICATIONS  (PRN):  acetaminophen     Tablet .. 650 milliGRAM(s) Oral every 6 hours PRN Temp greater or equal to 38C (100.4F), Mild Pain (1 - 3)  aluminum hydroxide/magnesium hydroxide/simethicone Suspension 30 milliLiter(s) Oral every 4 hours PRN Dyspepsia  melatonin 3 milliGRAM(s) Oral at bedtime PRN Insomnia  ondansetron Injectable 4 milliGRAM(s) IV Push every 8 hours PRN Nausea and/or Vomiting      PRESENT SYMPTOMS: [X ]Unable to obtain due to poor mentation   Source if other than patient:  [ ]Family   [ ]Team   [ ]All review of systems negative including pain and dyspnea unless noted below    All components of pain assessment below addressed. Blank spaces indicate that the patient did/could not complete the assessment.  Pain: [ ]yes [ ]no  QOL impact -   Location -                    Aggravating factors -  Quality -  Radiation -  Timing-  Severity (0-10 scale):  Minimal acceptable level (0-10 scale):     CPOT:    https://www.[email or web address]/getattachment/[email or web address]/Critical-Care-Pain-Observation-Tool-(CPOT)    PAIN AD Score: 0  http://geriatrictoolkit.[place].[facility]/cog/painad.pdf (press ctrl +  left click to view)    Dyspnea:                           [ ]None[ ]Mild [ ]Moderate [ ]Severe     Respiratory Distress Observation Scale (RDOS): 1  A score of 0 to 2 signifies little or no respiratory distress, 3 signifies mild distress, scores 4 to 6 indicate moderate distress, and scores greater than 7 signify severe distress  [email or web address]-aikljefxisk-ivngzhdk-ansnznoimkz-mkiwd.pdf    Anxiety:                             [ ]None[ ]Mild [ ]Moderate [ ]Severe   Fatigue:                             [ ]None[ ]Mild [ ]Moderate [ ]Severe   Nausea:                             [ ]None[ ]Mild [ ]Moderate [ ]Severe   Loss of appetite:              [ ]None[ ]Mild [ ]Moderate [ ]Severe   Constipation:                    [ ]None[ ]Mild [ ]Moderate [ ]Severe    Other Symptoms:      Palliative Performance Status Version 2:         %    http://npcrc.org/files/news/palliative_performance_scale_ppsv2.pdf  PHYSICAL EXAM:  Vital Signs Last 24 Hrs  T(C): 36.6 (18 Apr 2024 11:00), Max: 37.7 (17 Apr 2024 22:45)  T(F): 97.9 (18 Apr 2024 11:00), Max: 99.9 (17 Apr 2024 22:45)  HR: 118 (18 Apr 2024 11:00) (64 - 118)  BP: 87/65 (18 Apr 2024 11:00) (83/52 - 133/68)  BP(mean): 71 (18 Apr 2024 11:00) (61 - 93)  RR: 16 (18 Apr 2024 08:00) (16 - 18)  SpO2: 100% (18 Apr 2024 11:00) (95% - 100%)    Parameters below as of 18 Apr 2024 08:00  Patient On (Oxygen Delivery Method): nasal cannula  O2 Flow (L/min): 3   I&O's Summary    17 Apr 2024 07:01  -  18 Apr 2024 07:00  --------------------------------------------------------  IN: 340 mL / OUT: 450 mL / NET: -110 mL    18 Apr 2024 07:01  -  18 Apr 2024 15:35  --------------------------------------------------------  IN: 500 mL / OUT: 250 mL / NET: 250 mL        GENERAL:  [X ] No acute distress [ ]Lethargic  [ ]Unarousable  [ ]Verbal  [ ]Non-Verbal [ ]Cachexia    BEHAVIORAL/PSYCH:  [ ]Alert and Oriented x  [ ] Anxiety [ ] Delirium [ ] Agitation [X ] Calm   EYES: [ X] No scleral icterus [ ] Scleral icterus [ ] Closed  ENMT:  [ ]Dry mouth  [ ]No external oral lesions [ X] No external ear or nose lesions  CARDIOVASCULAR:  [ ]Regular [ ]Irregular [ ]Tachy [ ]Not Tachy  [ ]Raheem [ ] Edema [ ] No edema  PULMONARY:  [ ]Tachypnea  [ ]Audible excessive secretions [X ] No labored breathing [ ] labored breathing  GASTROINTESTINAL: [ ]Soft  [ ]Distended  [ X]Not distended [ ]Non tender [ ]Tender  MUSCULOSKELETAL: [ ]No clubbing [ ] clubbing  [ X] No cyanosis [ ] cyanosis  NEUROLOGIC: [ ]No focal deficits  [ ]Follows commands  [ ]Does not follow commands  [X ]Cognitive impairment  [ ]Dysphagia  [ ]Dysarthria  [ ]Paresis   SKIN: [ ] Jaundiced [X ] Non-jaundiced [ ]Rash [ ]No Rash [ ] Warm [ ] Dry  MISC/LINES: [ ] ET tube [ ] Trach [ ]NGT/OGT [ ]PEG [ ]Madsen    CRITICAL CARE:  [ ] Shock Present  [ ]Septic [ ]Cardiogenic [ ]Neurologic [ ]Hypovolemic  [ ]  Vasopressors [ ]  Inotropes   [ ]Respiratory failure present [ ]Mechanical ventilation [ ]Non-invasive ventilatory support [ ]High flow  [ ]Acute  [ ]Chronic [ ]Hypoxic  [ ]Hypercarbic [ ]Other  [ ]Other organ failure     LABS: reviewed by me, WNL                        8.9    10.13 )-----------( 322      ( 18 Apr 2024 04:43 )             28.5   04-18    139  |  100  |  6<L>  ----------------------------<  100<H>  4.3   |  29  |  <0.5<L>    Ca    8.5      18 Apr 2024 04:43  Mg     2.0     04-18        Urinalysis Basic - ( 18 Apr 2024 04:43 )    Color: x / Appearance: x / SG: x / pH: x  Gluc: 100 mg/dL / Ketone: x  / Bili: x / Urobili: x   Blood: x / Protein: x / Nitrite: x   Leuk Esterase: x / RBC: x / WBC x   Sq Epi: x / Non Sq Epi: x / Bacteria: x      RADIOLOGY & ADDITIONAL STUDIES: reviewed by me  CXR appears clear per my read    PROTEIN CALORIE MALNUTRITION PRESENT: [ ]mild [ ]moderate [ ]severe [ ]underweight [ ]morbid obesity  https://www.andeal.org/vault/2440/web/files/ONC/Table_Clinical%20Characteristics%20to%20Document%20Malnutrition-White%20JV%20et%20al%202012.pdf    Height (cm): 134 (04-15-24 @ 16:54), 134 (04-11-24 @ 13:16), 134 (04-02-24 @ 12:18)  Weight (kg): 42.728 (04-15-24 @ 17:00), 52 (04-02-24 @ 12:18), 60 (11-17-23 @ 15:00)  BMI (kg/m2): 23.8 (04-15-24 @ 17:00), 29 (04-15-24 @ 16:54), 29 (04-11-24 @ 13:16)    [ ]PPSV2 < or = to 30% [ ]significant weight loss  [ ]poor nutritional intake  [ ]anasarca      [ ]Artificial Nutrition          Palliative Care Spiritual/Emotional Screening Tool Question  Severity (0-4):                    OR                    [X ] Unable to determine/NA  Score of 2 or greater indicates recommendation of Chaplaincy referral  Chaplaincy Referral: [ ] Yes [ ] Refused [ ] Following     Caregiver [place]:  [ ] Yes [ ] No    OR    [x ] Unable to determine. Will assess at later time if appropriate.  Social Work Referral [ ]  Patient and Family Centered Care Referral [ ]    Anticipatory Grief Present: [ ] Yes [ ] No    OR     [ x] Unable to determine. Will assess at later time if appropriate.  Social Work Referral [ ]  Patient and Family Centered Care Referral [ ]    REFERRALS:   [ ]Chaplaincy  [ ]Hospice  [ ]Child Life  [ ]Social Work  [ ]Case management [ ]Holistic Therapy     Palliative care education provided to patient and/or family    Goals of Care Document:     ______________  Wu Jenkins MD  Palliative Medicine  [facility]   of Geriatric and Palliative Medicine  [phone]

## 2024-04-18 NOTE — PROGRESS NOTE ADULT - SUBJECTIVE AND OBJECTIVE BOX
Over Night Events: events noted, on NC, low grade fever    PHYSICAL EXAM    ICU Vital Signs Last 24 Hrs  T(C): 36.9 (18 Apr 2024 04:00), Max: 38.1 (17 Apr 2024 09:27)  T(F): 98.4 (18 Apr 2024 04:00), Max: 100.6 (17 Apr 2024 09:27)  HR: 75 (18 Apr 2024 04:00) (75 - 115)  BP: 118/59 (18 Apr 2024 04:00) (86/47 - 133/68)  BP(mean): 81 (18 Apr 2024 04:00) (74 - 93)  RR: 16 (18 Apr 2024 02:05) (16 - 18)  SpO2: 98% (18 Apr 2024 04:00) (98% - 99%)    O2 Parameters below as of 18 Apr 2024 02:05  Patient On (Oxygen Delivery Method): nasal cannula  O2 Flow (L/min): 3          General: ill looking  Lungs: Bilateral rhonchi  Cardiovascular: Regular   Abdomen: Soft, Positive BS  not following commands      04-16-24 @ 07:01  -  04-17-24 @ 07:00  --------------------------------------------------------  IN:    Enteral Tube Flush: 100 mL    Jevity 1.2: 240 mL  Total IN: 340 mL    OUT:  Total OUT: 0 mL    Total NET: 340 mL      04-17-24 @ 07:01  -  04-18-24 @ 06:47  --------------------------------------------------------  IN:    Free Water: 100 mL    Jevity 1.2: 240 mL  Total IN: 340 mL    OUT:  Total OUT: 0 mL    Total NET: 340 mL          LABS:                          8.9    10.13 )-----------( 322      ( 18 Apr 2024 04:43 )             28.5                                               04-18    139  |  100  |  6<L>  ----------------------------<  100<H>  4.3   |  29  |  <0.5<L>    Ca    8.5      18 Apr 2024 04:43  Phos  2.8     04-16  Mg     2.0     04-18    TPro  5.4<L>  /  Alb  2.8<L>  /  TBili  0.4  /  DBili  x   /  AST  13  /  ALT  11  /  AlkPhos  88  04-16                                             Urinalysis Basic - ( 18 Apr 2024 04:43 )    Color: x / Appearance: x / SG: x / pH: x  Gluc: 100 mg/dL / Ketone: x  / Bili: x / Urobili: x   Blood: x / Protein: x / Nitrite: x   Leuk Esterase: x / RBC: x / WBC x   Sq Epi: x / Non Sq Epi: x / Bacteria: x                                                  LIVER FUNCTIONS - ( 16 Apr 2024 07:03 )  Alb: 2.8 g/dL / Pro: 5.4 g/dL / ALK PHOS: 88 U/L / ALT: 11 U/L / AST: 13 U/L / GGT: x                                                  Culture - Blood (collected 15 Apr 2024 17:58)  Source: .Blood Blood-Peripheral  Gram Stain (16 Apr 2024 23:38):    Growth in aerobic bottle: Gram positive cocci in pairs  Final Report (17 Apr 2024 19:45):    Growth in aerobic bottle: Staphylococcus capitis    Isolation of Coagulase negative Staphylococcus from single blood culture    sets may represent    contamination. Contact the Microbiology Department at [phone] if    susceptibility testing is    clinically indicated.    Direct identification is available within approximately 3-5    hours either by Blood Panel Multiplexed PCR or Direct    MALDI-TOF. Details: [email or web address]  Organism: Blood Culture PCR (17 Apr 2024 19:45)  Organism: Blood Culture PCR (17 Apr 2024 19:45)    Culture - Blood (collected 15 Apr 2024 17:58)  Source: .Blood Blood-Peripheral  Preliminary Report (17 Apr 2024 23:08):    No growth at 48 Hours                                                                                           MEDICATIONS  (STANDING):  albuterol/ipratropium for Nebulization 3 milliLiter(s) Nebulizer every 6 hours  AQUAPHOR (petrolatum Ointment) 1 Application(s) Topical two times a day  artificial  tears Solution 1 Drop(s) Both EYES two times a day  calcium carbonate   1250 mG (OsCal) 1 Tablet(s) Oral daily  enoxaparin Injectable 30 milliGRAM(s) SubCutaneous every 24 hours  famotidine    Tablet 20 milliGRAM(s) Oral two times a day  gabapentin 300 milliGRAM(s) Oral two times a day  levETIRAcetam  Solution 750 milliGRAM(s) Oral two times a day  meropenem  IVPB 1000 milliGRAM(s) IV Intermittent every 8 hours  midodrine 10 milliGRAM(s) Oral every 8 hours  norepinephrine Infusion 0.05 MICROgram(s)/kG/Min (4.01 mL/Hr) IV Continuous <Continuous>  raloxifene 60 milliGRAM(s) Oral daily  scopolamine 1 mG/72 Hr(s) Patch 1 Patch Transdermal every 72 hours  senna 2 Tablet(s) Oral at bedtime    MEDICATIONS  (PRN):  acetaminophen     Tablet .. 650 milliGRAM(s) Oral every 6 hours PRN Temp greater or equal to 38C (100.4F), Mild Pain (1 - 3)  aluminum hydroxide/magnesium hydroxide/simethicone Suspension 30 milliLiter(s) Oral every 4 hours PRN Dyspepsia  melatonin 3 milliGRAM(s) Oral at bedtime PRN Insomnia  ondansetron Injectable 4 milliGRAM(s) IV Push every 8 hours PRN Nausea and/or Vomiting

## 2024-04-18 NOTE — CONSULT NOTE ADULT - ASSESSMENT
58F with PMH including Down's syndrome, cerebral palsy, and seizure disorder, here from [facility] with respiratory distress and acute respiratory failure, found to have septic shock from CAP on IV abx and pressors. Palliative consulted for GOC. DNR/DNI.

## 2024-04-18 NOTE — PATIENT PROFILE ADULT - FALL HARM RISK - HARM RISK INTERVENTIONS

## 2024-04-18 NOTE — PROGRESS NOTE ADULT - ASSESSMENT
IMPRESSION:    Acute hypoxemic respiratory failure on NC improving  PNA possible aspiration/ recurrent admission  Sepsis POA  HO DVT  HO GI bleed  HO OM  HO recent duodenal perforation   HO polymicrobial bacteremia   H/o CP, DS  H/o seizures    PLAN:    CNS:  Avoid CNS Depressant, AED.    HEENT: Oral care. Eye drops.    PULMONARY: HOB at 45 degrees.  Aspiration precaution. Wean FiO2 Keep spo2 92 TO 96%. CHEST PT. Nebs q6 . NC    CARDIOVASCULAR: Keep MAP more than 60. Avoid overload    GI: Protonix. npo    INFECTIOUS DISEASE:  ABX , pancx,     HEMATOLOGICAL:  DVT px, Monitor CBC.     ENDOCRINE:  Follow up FS.  Insulin protocol if needed.    MUSCULOSKELETAL: Bedrest.  Off loading.  Wound care.    Prognosis very poor.  Palliative care following  dnr/i,   FLOOR

## 2024-04-18 NOTE — PROGRESS NOTE ADULT - SUBJECTIVE AND OBJECTIVE BOX
Patient is a 58y old  Female who presents with a chief complaint of Pneumonia (04-18-24)      Pt seen and examined at bedside. Unable to get ROS       PAST MEDICAL & SURGICAL HISTORY:  Down syndrome    Osteoporosis    Mild anemia    Neuropathy    S/P debridement  of R hip on 3/2/21        VITAL SIGNS (Last 24 hrs):  T(C): 36.6 (04-18-24 @ 11:00), Max: 37.7 (04-17-24 @ 22:45)  HR: 118 (04-18-24 @ 11:00) (64 - 118)  BP: 87/65 (04-18-24 @ 11:00) (83/52 - 133/68)  RR: 16 (04-18-24 @ 08:00) (16 - 18)  SpO2: 100% (04-18-24 @ 11:00) (95% - 100%)  Wt(kg): --  Daily     Daily     I&O's Summary    17 Apr 2024 07:01  -  18 Apr 2024 07:00  --------------------------------------------------------  IN: 340 mL / OUT: 450 mL / NET: -110 mL    18 Apr 2024 07:01  -  18 Apr 2024 12:02  --------------------------------------------------------  IN: 500 mL / OUT: 0 mL / NET: 500 mL        PHYSICAL EXAM:  GENERAL: NAD, well-developed  HEAD:  Atraumatic, Normocephalic  EYES: EOMI, PERRLA, conjunctiva and sclera clear  NECK: Supple, No JVD  CHEST/LUNG: Clear to auscultation bilaterally; No wheeze  HEART: Regular rate and rhythm; No murmurs, rubs, or gallops  ABDOMEN: Soft, Nontender, Nondistended; Bowel sounds present  EXTREMITIES:  2+ Peripheral Pulses, No clubbing, cyanosis, or edema  PSYCH: AAOx3  NEUROLOGY: non-focal  SKIN: No rashes or lesions    Labs Reviewed  Spoke to patient in regards to abnormal labs.    CBC Full  -  ( 18 Apr 2024 04:43 )  WBC Count : 10.13 K/uL  Hemoglobin : 8.9 g/dL  Hematocrit : 28.5 %  Platelet Count - Automated : 322 K/uL  Mean Cell Volume : 89.6 fL  Mean Cell Hemoglobin : 28.0 pg  Mean Cell Hemoglobin Concentration : 31.2 g/dL  Auto Neutrophil # : 7.86 K/uL  Auto Lymphocyte # : 1.41 K/uL  Auto Monocyte # : 0.61 K/uL  Auto Eosinophil # : 0.13 K/uL  Auto Basophil # : 0.05 K/uL  Auto Neutrophil % : 77.6 %  Auto Lymphocyte % : 13.9 %  Auto Monocyte % : 6.0 %  Auto Eosinophil % : 1.3 %  Auto Basophil % : 0.5 %    BMP:    04-18 @ 04:43    Blood Urea Nitrogen - 6  Calcium - 8.5  Carbond Dioxide - 29  Chloride - 100  Creatinine - <0.5  Glucose - 100  Potassium - 4.3  Sodium - 139      Hemoglobin A1c -   PT/INR - ( 15 Apr 2024 17:58 )   PT: 13.80 sec;   INR: 1.21 ratio         PTT - ( 15 Apr 2024 17:58 )  PTT:29.9 sec  Urine Culture:  04-15 @ 17:58 Urine culture: --    Culture Results:   No growth at 48 Hours  Method Type: PCR  Organism: Blood Culture PCR  Organism Identification: Blood Culture PCR  Specimen Source: .Blood Blood-Peripheral        COVID Labs  CRP:    Procalcitonin, Serum: 6.93 ng/mL (04-16-24 @ 07:03)    D-Dimer:      Fungitell: 81 pg/mL (03-13-24 @ 17:24)  Fungitell: 73 pg/mL (02-23-24 @ 18:19)  Fungitell: 76 pg/mL (02-19-24 @ 16:00)  Fungitell: 63 pg/mL (02-06-24 @ 11:27)  Fungitell: 65 pg/mL (02-06-24 @ 04:43)  Fungitell: 325 pg/mL (01-20-24 @ 21:23)  Fungitell: 138 pg/mL (11-07-23 @ 08:08)  Fungitell: 115 pg/mL (11-02-23 @ 11:40)    Fungitell: 81 pg/mL (03-13-24 @ 17:24)  Fungitell: 73 pg/mL (02-23-24 @ 18:19)  Fungitell: 76 pg/mL (02-19-24 @ 16:00)  Fungitell: 63 pg/mL (02-06-24 @ 11:27)  Fungitell: 65 pg/mL (02-06-24 @ 04:43)  Fungitell: 325 pg/mL (01-20-24 @ 21:23)  Fungitell: 138 pg/mL (11-07-23 @ 08:08)  Fungitell: 115 pg/mL (11-02-23 @ 11:40)      Imaging reviewed independently and reviewed read        MEDICATIONS  (STANDING):  albuterol/ipratropium for Nebulization 3 milliLiter(s) Nebulizer every 6 hours  AQUAPHOR (petrolatum Ointment) 1 Application(s) Topical two times a day  artificial  tears Solution 1 Drop(s) Both EYES two times a day  calcium carbonate   1250 mG (OsCal) 1 Tablet(s) Oral daily  enoxaparin Injectable 30 milliGRAM(s) SubCutaneous every 24 hours  famotidine    Tablet 20 milliGRAM(s) Oral two times a day  gabapentin 300 milliGRAM(s) Oral two times a day  levETIRAcetam  Solution 750 milliGRAM(s) Oral two times a day  meropenem  IVPB 1000 milliGRAM(s) IV Intermittent every 8 hours  midodrine 10 milliGRAM(s) Oral every 8 hours  norepinephrine Infusion 0.05 MICROgram(s)/kG/Min (4.01 mL/Hr) IV Continuous <Continuous>  raloxifene 60 milliGRAM(s) Oral daily  scopolamine 1 mG/72 Hr(s) Patch 1 Patch Transdermal every 72 hours  senna 2 Tablet(s) Oral at bedtime    MEDICATIONS  (PRN):  acetaminophen     Tablet .. 650 milliGRAM(s) Oral every 6 hours PRN Temp greater or equal to 38C (100.4F), Mild Pain (1 - 3)  aluminum hydroxide/magnesium hydroxide/simethicone Suspension 30 milliLiter(s) Oral every 4 hours PRN Dyspepsia  melatonin 3 milliGRAM(s) Oral at bedtime PRN Insomnia  ondansetron Injectable 4 milliGRAM(s) IV Push every 8 hours PRN Nausea and/or Vomiting       Patient is a 58y old  Female who presents with a chief complaint of Pneumonia (04-18-24)      Pt seen and examined at bedside. Unable to get ROS       PAST MEDICAL & SURGICAL HISTORY:  Down syndrome    Osteoporosis    Mild anemia    Neuropathy    S/P debridement  of R hip on 3/2/21        VITAL SIGNS (Last 24 hrs):  T(C): 36.6 (04-18-24 @ 11:00), Max: 37.7 (04-17-24 @ 22:45)  HR: 118 (04-18-24 @ 11:00) (64 - 118)  BP: 87/65 (04-18-24 @ 11:00) (83/52 - 133/68)  RR: 16 (04-18-24 @ 08:00) (16 - 18)  SpO2: 100% (04-18-24 @ 11:00) (95% - 100%)  Wt(kg): --  Daily     Daily     I&O's Summary    17 Apr 2024 07:01  -  18 Apr 2024 07:00  --------------------------------------------------------  IN: 340 mL / OUT: 450 mL / NET: -110 mL    18 Apr 2024 07:01  -  18 Apr 2024 12:02  --------------------------------------------------------  IN: 500 mL / OUT: 0 mL / NET: 500 mL        PHYSICAL EXAM:  GENERAL: NAD  HEAD:  Atraumatic, Normocephalic  EYES: EOMI, PERRLA, conjunctiva and sclera clear  NECK: Supple, No JVD  CHEST/LUNG: Clear to auscultation bilaterally; No wheeze  HEART: Regular rate and rhythm; No murmurs, rubs, or gallops  ABDOMEN: Soft, Nontender, Nondistended; Bowel sounds present  EXTREMITIES:  2+ Peripheral Pulses, No clubbing, cyanosis, or edema  PSYCH: Alert  SKIN: multiple ulcers    Labs Reviewed  Spoke to patient in regards to abnormal labs.    CBC Full  -  ( 18 Apr 2024 04:43 )  WBC Count : 10.13 K/uL  Hemoglobin : 8.9 g/dL  Hematocrit : 28.5 %  Platelet Count - Automated : 322 K/uL  Mean Cell Volume : 89.6 fL  Mean Cell Hemoglobin : 28.0 pg  Mean Cell Hemoglobin Concentration : 31.2 g/dL  Auto Neutrophil # : 7.86 K/uL  Auto Lymphocyte # : 1.41 K/uL  Auto Monocyte # : 0.61 K/uL  Auto Eosinophil # : 0.13 K/uL  Auto Basophil # : 0.05 K/uL  Auto Neutrophil % : 77.6 %  Auto Lymphocyte % : 13.9 %  Auto Monocyte % : 6.0 %  Auto Eosinophil % : 1.3 %  Auto Basophil % : 0.5 %    BMP:    04-18 @ 04:43    Blood Urea Nitrogen - 6  Calcium - 8.5  Carbond Dioxide - 29  Chloride - 100  Creatinine - <0.5  Glucose - 100  Potassium - 4.3  Sodium - 139      Hemoglobin A1c -   PT/INR - ( 15 Apr 2024 17:58 )   PT: 13.80 sec;   INR: 1.21 ratio         PTT - ( 15 Apr 2024 17:58 )  PTT:29.9 sec  Urine Culture:  04-15 @ 17:58 Urine culture: --    Culture Results:   No growth at 48 Hours  Method Type: PCR  Organism: Blood Culture PCR  Organism Identification: Blood Culture PCR  Specimen Source: .Blood Blood-Peripheral        COVID Labs  CRP:    Procalcitonin, Serum: 6.93 ng/mL (04-16-24 @ 07:03)    D-Dimer:      Fungitell: 81 pg/mL (03-13-24 @ 17:24)  Fungitell: 73 pg/mL (02-23-24 @ 18:19)  Fungitell: 76 pg/mL (02-19-24 @ 16:00)  Fungitell: 63 pg/mL (02-06-24 @ 11:27)  Fungitell: 65 pg/mL (02-06-24 @ 04:43)  Fungitell: 325 pg/mL (01-20-24 @ 21:23)  Fungitell: 138 pg/mL (11-07-23 @ 08:08)  Fungitell: 115 pg/mL (11-02-23 @ 11:40)    Fungitell: 81 pg/mL (03-13-24 @ 17:24)  Fungitell: 73 pg/mL (02-23-24 @ 18:19)  Fungitell: 76 pg/mL (02-19-24 @ 16:00)  Fungitell: 63 pg/mL (02-06-24 @ 11:27)  Fungitell: 65 pg/mL (02-06-24 @ 04:43)  Fungitell: 325 pg/mL (01-20-24 @ 21:23)  Fungitell: 138 pg/mL (11-07-23 @ 08:08)  Fungitell: 115 pg/mL (11-02-23 @ 11:40)      Imaging reviewed independently and reviewed read        MEDICATIONS  (STANDING):  albuterol/ipratropium for Nebulization 3 milliLiter(s) Nebulizer every 6 hours  AQUAPHOR (petrolatum Ointment) 1 Application(s) Topical two times a day  artificial  tears Solution 1 Drop(s) Both EYES two times a day  calcium carbonate   1250 mG (OsCal) 1 Tablet(s) Oral daily  enoxaparin Injectable 30 milliGRAM(s) SubCutaneous every 24 hours  famotidine    Tablet 20 milliGRAM(s) Oral two times a day  gabapentin 300 milliGRAM(s) Oral two times a day  levETIRAcetam  Solution 750 milliGRAM(s) Oral two times a day  meropenem  IVPB 1000 milliGRAM(s) IV Intermittent every 8 hours  midodrine 10 milliGRAM(s) Oral every 8 hours  norepinephrine Infusion 0.05 MICROgram(s)/kG/Min (4.01 mL/Hr) IV Continuous <Continuous>  raloxifene 60 milliGRAM(s) Oral daily  scopolamine 1 mG/72 Hr(s) Patch 1 Patch Transdermal every 72 hours  senna 2 Tablet(s) Oral at bedtime    MEDICATIONS  (PRN):  acetaminophen     Tablet .. 650 milliGRAM(s) Oral every 6 hours PRN Temp greater or equal to 38C (100.4F), Mild Pain (1 - 3)  aluminum hydroxide/magnesium hydroxide/simethicone Suspension 30 milliLiter(s) Oral every 4 hours PRN Dyspepsia  melatonin 3 milliGRAM(s) Oral at bedtime PRN Insomnia  ondansetron Injectable 4 milliGRAM(s) IV Push every 8 hours PRN Nausea and/or Vomiting

## 2024-04-19 LAB
ANION GAP SERPL CALC-SCNC: 13 MMOL/L — SIGNIFICANT CHANGE UP (ref 7–14)
BASOPHILS # BLD AUTO: 0.05 K/UL — SIGNIFICANT CHANGE UP (ref 0–0.2)
BASOPHILS NFR BLD AUTO: 0.8 % — SIGNIFICANT CHANGE UP (ref 0–1)
BUN SERPL-MCNC: 9 MG/DL — LOW (ref 10–20)
CALCIUM SERPL-MCNC: 8.4 MG/DL — SIGNIFICANT CHANGE UP (ref 8.4–10.4)
CHLORIDE SERPL-SCNC: 97 MMOL/L — LOW (ref 98–110)
CO2 SERPL-SCNC: 27 MMOL/L — SIGNIFICANT CHANGE UP (ref 17–32)
CREAT SERPL-MCNC: <0.5 MG/DL — LOW (ref 0.7–1.5)
EGFR: 115 ML/MIN/1.73M2 — SIGNIFICANT CHANGE UP
EOSINOPHIL # BLD AUTO: 0.15 K/UL — SIGNIFICANT CHANGE UP (ref 0–0.7)
EOSINOPHIL NFR BLD AUTO: 2.5 % — SIGNIFICANT CHANGE UP (ref 0–8)
GLUCOSE BLDC GLUCOMTR-MCNC: 171 MG/DL — HIGH (ref 70–99)
GLUCOSE SERPL-MCNC: 116 MG/DL — HIGH (ref 70–99)
HCT VFR BLD CALC: 28.6 % — LOW (ref 37–47)
HGB BLD-MCNC: 8.8 G/DL — LOW (ref 12–16)
IMM GRANULOCYTES NFR BLD AUTO: 1 % — HIGH (ref 0.1–0.3)
LYMPHOCYTES # BLD AUTO: 1.38 K/UL — SIGNIFICANT CHANGE UP (ref 1.2–3.4)
LYMPHOCYTES # BLD AUTO: 23.3 % — SIGNIFICANT CHANGE UP (ref 20.5–51.1)
MCHC RBC-ENTMCNC: 27.5 PG — SIGNIFICANT CHANGE UP (ref 27–31)
MCHC RBC-ENTMCNC: 30.8 G/DL — LOW (ref 32–37)
MCV RBC AUTO: 89.4 FL — SIGNIFICANT CHANGE UP (ref 81–99)
MONOCYTES # BLD AUTO: 0.43 K/UL — SIGNIFICANT CHANGE UP (ref 0.1–0.6)
MONOCYTES NFR BLD AUTO: 7.3 % — SIGNIFICANT CHANGE UP (ref 1.7–9.3)
NEUTROPHILS # BLD AUTO: 3.86 K/UL — SIGNIFICANT CHANGE UP (ref 1.4–6.5)
NEUTROPHILS NFR BLD AUTO: 65.1 % — SIGNIFICANT CHANGE UP (ref 42.2–75.2)
NRBC # BLD: 0 /100 WBCS — SIGNIFICANT CHANGE UP (ref 0–0)
PLATELET # BLD AUTO: 334 K/UL — SIGNIFICANT CHANGE UP (ref 130–400)
PMV BLD: 10.1 FL — SIGNIFICANT CHANGE UP (ref 7.4–10.4)
POTASSIUM SERPL-MCNC: 4.2 MMOL/L — SIGNIFICANT CHANGE UP (ref 3.5–5)
POTASSIUM SERPL-SCNC: 4.2 MMOL/L — SIGNIFICANT CHANGE UP (ref 3.5–5)
RBC # BLD: 3.2 M/UL — LOW (ref 4.2–5.4)
RBC # FLD: 21.9 % — HIGH (ref 11.5–14.5)
SODIUM SERPL-SCNC: 137 MMOL/L — SIGNIFICANT CHANGE UP (ref 135–146)
WBC # BLD: 5.93 K/UL — SIGNIFICANT CHANGE UP (ref 4.8–10.8)
WBC # FLD AUTO: 5.93 K/UL — SIGNIFICANT CHANGE UP (ref 4.8–10.8)

## 2024-04-19 PROCEDURE — 99233 SBSQ HOSP IP/OBS HIGH 50: CPT

## 2024-04-19 PROCEDURE — 99221 1ST HOSP IP/OBS SF/LOW 40: CPT

## 2024-04-19 PROCEDURE — 99232 SBSQ HOSP IP/OBS MODERATE 35: CPT

## 2024-04-19 RX ORDER — MIDODRINE HYDROCHLORIDE 2.5 MG/1
15 TABLET ORAL EVERY 8 HOURS
Refills: 0 | Status: DISCONTINUED | OUTPATIENT
Start: 2024-04-19 | End: 2024-04-20

## 2024-04-19 RX ORDER — CHLORHEXIDINE GLUCONATE 213 G/1000ML
1 SOLUTION TOPICAL DAILY
Refills: 0 | Status: DISCONTINUED | OUTPATIENT
Start: 2024-04-19 | End: 2024-05-15

## 2024-04-19 RX ADMIN — Medication 1 APPLICATION(S): at 17:51

## 2024-04-19 RX ADMIN — MEROPENEM 100 MILLIGRAM(S): 1 INJECTION INTRAVENOUS at 05:39

## 2024-04-19 RX ADMIN — GABAPENTIN 300 MILLIGRAM(S): 400 CAPSULE ORAL at 05:39

## 2024-04-19 RX ADMIN — Medication 1 APPLICATION(S): at 12:47

## 2024-04-19 RX ADMIN — FAMOTIDINE 20 MILLIGRAM(S): 10 INJECTION INTRAVENOUS at 05:38

## 2024-04-19 RX ADMIN — Medication 3 MILLILITER(S): at 09:04

## 2024-04-19 RX ADMIN — Medication 1 TABLET(S): at 12:46

## 2024-04-19 RX ADMIN — SCOPALAMINE 1 PATCH: 1 PATCH, EXTENDED RELEASE TRANSDERMAL at 08:01

## 2024-04-19 RX ADMIN — FAMOTIDINE 20 MILLIGRAM(S): 10 INJECTION INTRAVENOUS at 17:27

## 2024-04-19 RX ADMIN — CHLORHEXIDINE GLUCONATE 1 APPLICATION(S): 213 SOLUTION TOPICAL at 12:46

## 2024-04-19 RX ADMIN — SENNA PLUS 2 TABLET(S): 8.6 TABLET ORAL at 21:41

## 2024-04-19 RX ADMIN — MIDODRINE HYDROCHLORIDE 15 MILLIGRAM(S): 2.5 TABLET ORAL at 21:41

## 2024-04-19 RX ADMIN — Medication 1 DROP(S): at 17:28

## 2024-04-19 RX ADMIN — Medication 1 DROP(S): at 05:38

## 2024-04-19 RX ADMIN — MIDODRINE HYDROCHLORIDE 15 MILLIGRAM(S): 2.5 TABLET ORAL at 17:26

## 2024-04-19 RX ADMIN — MEROPENEM 100 MILLIGRAM(S): 1 INJECTION INTRAVENOUS at 17:27

## 2024-04-19 RX ADMIN — Medication 1 APPLICATION(S): at 05:39

## 2024-04-19 RX ADMIN — Medication 1 APPLICATION(S): at 17:52

## 2024-04-19 RX ADMIN — LEVETIRACETAM 750 MILLIGRAM(S): 250 TABLET, FILM COATED ORAL at 05:38

## 2024-04-19 RX ADMIN — RALOXIFENE HYDROCHLORIDE 60 MILLIGRAM(S): 60 TABLET, COATED ORAL at 12:46

## 2024-04-19 RX ADMIN — MIDODRINE HYDROCHLORIDE 10 MILLIGRAM(S): 2.5 TABLET ORAL at 05:38

## 2024-04-19 RX ADMIN — Medication 3 MILLILITER(S): at 01:35

## 2024-04-19 RX ADMIN — Medication 3 MILLILITER(S): at 14:30

## 2024-04-19 RX ADMIN — LEVETIRACETAM 750 MILLIGRAM(S): 250 TABLET, FILM COATED ORAL at 17:26

## 2024-04-19 NOTE — CONSULT NOTE ADULT - SUBJECTIVE AND OBJECTIVE BOX
HPI:  Pt is a 58F w/ PMHx Down Syndrome, nonverbal at baseline, Hypothyroidism, Cerebral palsy and Seizure Disorders presents to the ED from \[facility]\"" for respiratory distress and high grade fever. Of note patient recently DC on 04/08/2024 with prolonged 3 month hospital course for similar presentation  On admission vitals: BP 74/55  RR 22 T103F sat 84% on RA placed on Bipap sat 100% on my examination  Labs WBC 12  Patient found to be septic on admission. Admitted to SDU for management of acute respiratory distress 2/2 CAP/Aspiration PNA.  (16 Apr 2024 00:34)      PAST MEDICAL & SURGICAL HISTORY:  Down syndrome  Osteoporosis  Mild anemia  Neuropathy  S/P debridement  of R hip on 3/2/21    REVIEW OF SYSTEMS: Pt unable to offer      MEDICATIONS  (STANDING):  albuterol/ipratropium for Nebulization 3 milliLiter(s) Nebulizer every 6 hours  AQUAPHOR (petrolatum Ointment) 1 Application(s) Topical two times a day  artificial  tears Solution 1 Drop(s) Both EYES two times a day  calcium carbonate   1250 mG (OsCal) 1 Tablet(s) Oral daily  chlorhexidine 2% Cloths 1 Application(s) Topical daily  enoxaparin Injectable 30 milliGRAM(s) SubCutaneous every 24 hours  famotidine    Tablet 20 milliGRAM(s) Oral two times a day  gabapentin Solution 300 milliGRAM(s) Oral two times a day  hydrocortisone 1% Cream 1 Application(s) Topical daily  levETIRAcetam  Solution 750 milliGRAM(s) Oral two times a day  meropenem  IVPB 1000 milliGRAM(s) IV Intermittent every 8 hours  midodrine 15 milliGRAM(s) Oral every 8 hours  norepinephrine Infusion 0.05 MICROgram(s)/kG/Min (4.01 mL/Hr) IV Continuous <Continuous>  raloxifene 60 milliGRAM(s) Oral daily  scopolamine 1 mG/72 Hr(s) Patch 1 Patch Transdermal every 72 hours  senna 2 Tablet(s) Oral at bedtime    MEDICATIONS  (PRN):  acetaminophen     Tablet .. 650 milliGRAM(s) Oral every 6 hours PRN Temp greater or equal to 38C (100.4F), Mild Pain (1 - 3)  aluminum hydroxide/magnesium hydroxide/simethicone Suspension 30 milliLiter(s) Oral every 4 hours PRN Dyspepsia  melatonin 3 milliGRAM(s) Oral at bedtime PRN Insomnia  ondansetron Injectable 4 milliGRAM(s) IV Push every 8 hours PRN Nausea and/or Vomiting      Allergies  No Known Allergies  Intolerances        SOCIAL HISTORY:  From Group Home     FAMILY HISTORY:     No Pertinent family history noted     PHYSICAL EXAM:  Vital Signs Last 24 Hrs  T(C): 36.6 (19 Apr 2024 08:00), Max: 36.6 (19 Apr 2024 08:00)  T(F): 97.8 (19 Apr 2024 08:00), Max: 97.8 (19 Apr 2024 08:00)  HR: 111 (19 Apr 2024 08:00) (60 - 125)  BP: 146/81 (19 Apr 2024 08:00) (77/49 - 148/64)  BP(mean): 97 (19 Apr 2024 08:00) (57 - 97)  RR: 18 (19 Apr 2024 08:00) (18 - 20)  SpO2: 94% (19 Apr 2024 08:00) (83% - 100%)    Parameters below as of 19 Apr 2024 07:00  Patient On (Oxygen Delivery Method): nasal cannula  O2 Flow (L/min): 3       General : NAD   HEENT:  NC/AT, PERRL, EOMI, sclera clear, mucosa moist, throat clear, trachea midline, neck supple  Cardiovascular: RRR   Respiratory: O2 3L NC  Gastrointestinal :  Soft NT/ND (+)BS   Incontinence   : Incontinence   Neurology:  Weakened strength & sensation   Psych: Calm  Musculoskeletal:  limited,  deformities, contractures  Vascular: B? L LE equally diminished pulses   Skin:  Lower extremity wounds       LABS/ CULTURES/ RADIOLOGY:                        8.8    5.93  )-----------( 334      ( 19 Apr 2024 04:27 )             28.6       137  |  97  |  9   ----------------------------<  116      [04-19-24 @ 04:27]  4.2   |  27  |  <0.5        Ca     8.4     [04-19-24 @ 04:27]      Mg     2.0     [04-18-24 @ 04:43]      Phos  2.8     [04-16-24 @ 07:03]                Culture - Blood (collected 04-17-24 @ 11:00)  Source: .Blood None  Preliminary Report (04-18-24 @ 21:01):    No growth at 24 hours    Culture - Blood (collected 04-15-24 @ 17:58)  Source: .Blood Blood-Peripheral  Gram Stain (04-16-24 @ 23:38):    Growth in aerobic bottle: Gram positive cocci in pairs  Final Report (04-17-24 @ 19:45):    Growth in aerobic bottle: Staphylococcus capitis    Isolation of Coagulase negative Staphylococcus from single blood culture    sets may represent    contamination. Contact the Microbiology Department at [phone] if    susceptibility testing is    clinically indicated.    Direct identification is available within approximately 3-5    hours either by Blood Panel Multiplexed PCR or Direct    MALDI-TOF. Details: [email or web address]  Organism: Blood Culture PCR (04-17-24 @ 19:45)  Organism: Blood Culture PCR (04-17-24 @ 19:45)      Method Type: PCR      -  Coagulase negative Staphylococcus: Detec    Culture - Blood (collected 04-15-24 @ 17:58)  Source: .Blood Blood-Peripheral  Preliminary Report (04-18-24 @ 23:00):    No growth at 72 Hours

## 2024-04-19 NOTE — PROGRESS NOTE ADULT - ASSESSMENT
58F with PMH including Down's syndrome, cerebral palsy, and seizure disorder, here from [facility] with respiratory distress and acute respiratory failure, found to have septic shock from CAP on IV abx and pressors. Palliative consulted for GOC. DNR/DNI.

## 2024-04-19 NOTE — CONSULT NOTE ADULT - CONSULT REASON
SOB
Lower extremity wound assessment
acute hypoxic respiratory failure secondary to PNA in patient w ESBL Hx
GOC

## 2024-04-19 NOTE — PROGRESS NOTE ADULT - ASSESSMENT
57yo F w/ pmhx of Down's syndrome, cerebral palsy, seizure disorder presents from [facility] for respiratory distress. Admitted to SDU for severe sepsis and acute hypoxemic respiratory failure likely secondary to recurrent CAP.     #acute hypoxemic respiratory failure   #Severe sepsis with septic shock(WBC 12.25, Tmax 104.4F, 84% RA; hypotensive initially SBP 70s + source)  #possible recurrent CAP   #chronic hypotension   # metabolic encephalopathy  - CXR: possible basilar opacities  - covid/RSV, MSRA, negative; Bl cx with contaminant   - C/w Midodrine, wean off levophed, MAP 60, increase midodrine 15 mg TiD   - c/w Alicia  - Scopolamine for secretions. aspiration precautions   - F/u bcx; fungitell (previously positive s/p caspo), Urine for strep pneumonia/legionella antigen  - F/u palliative eval   - fungitell improving     # B/l feet ulcers  - No abscess/cellulitis  - colonized with ESBL strain  -Off loading to prevent pressure sores     #seizure disorder  #Down syndrome  #Nonverbal bedbound  # PEG tube   - c/w home meds    DVT ppx: lovenox  Code status: DNR/DNI, trial of NIV  Diet: NPO w/ peg feeds   Dispo: from [facility], keep in SDU, [facility]

## 2024-04-19 NOTE — PROGRESS NOTE ADULT - ASSESSMENT
IMPRESSION:    Acute hypoxemic respiratory failure on NC improving  PNA possible aspiration/ recurrent admission  Sepsis POA  HO DVT  HO GI bleed  HO OM  HO recent duodenal perforation   HO polymicrobial bacteremia   H/o CP, DS  H/o seizures    PLAN:    CNS:  Avoid CNS Depressant, AED    HEENT: Oral care. Eye drops.    PULMONARY: HOB at 45 degrees.  Aspiration precaution. Wean FiO2 Keep spo2 92 TO 96%. CHEST PT. Nebs q6 . NC    CARDIOVASCULAR: Keep MAP more than 60. Avoid overload., taper pressors    GI: Protonix. NGT    INFECTIOUS DISEASE:  ABX per ID    HEMATOLOGICAL:  DVT px, Monitor CBC.     ENDOCRINE:  Follow up FS.  Insulin protocol if needed.    MUSCULOSKELETAL: Bedrest.  Off loading.  Wound care.    Prognosis very poor.  Palliative care following  dnr/i,   FLOOR

## 2024-04-19 NOTE — PROGRESS NOTE ADULT - SUBJECTIVE AND OBJECTIVE BOX
Patient is a 58y old  Female who presents with a chief complaint of Pneumonia (04-19-24)      Pt seen and examined at bedside. unable to get ros, pt is on levophe 0.01          PAST MEDICAL & SURGICAL HISTORY:  Down syndrome    Osteoporosis    Mild anemia    Neuropathy    S/P debridement  of R hip on 3/2/21        VITAL SIGNS (Last 24 hrs):  T(C): 36.6 (04-19-24 @ 08:00), Max: 36.6 (04-19-24 @ 08:00)  HR: 115 (04-19-24 @ 12:00) (60 - 125)  BP: 146/81 (04-19-24 @ 08:00) (77/49 - 148/64)  RR: 18 (04-19-24 @ 08:00) (18 - 20)  SpO2: 95% (04-19-24 @ 12:00) (83% - 100%)  Wt(kg): --  Daily     Daily     I&O's Summary    18 Apr 2024 07:01  -  19 Apr 2024 07:00  --------------------------------------------------------  IN: 1807.6 mL / OUT: 1125 mL / NET: 682.6 mL        PHYSICAL EXAM:  GENERAL: NAD, contracted, intellectual disability   HEAD:  Atraumatic, Normocephalic  EYES: EOMI, PERRLA, conjunctiva and sclera clear  NECK: Supple, No JVD  CHEST/LUNG: Clear to auscultation bilaterally; No wheeze  HEART: Regular rate and rhythm; No murmurs, rubs, or gallops  ABDOMEN: Soft, Nontender, Nondistended; Bowel sounds present  EXTREMITIES:  2+ Peripheral Pulses, No clubbing, cyanosis, or edema, contracted  PSYCH: eyes open  Skin: multiple heel ulcers     Labs Reviewed  Spoke to patient in regards to abnormal labs.    CBC Full  -  ( 19 Apr 2024 04:27 )  WBC Count : 5.93 K/uL  Hemoglobin : 8.8 g/dL  Hematocrit : 28.6 %  Platelet Count - Automated : 334 K/uL  Mean Cell Volume : 89.4 fL  Mean Cell Hemoglobin : 27.5 pg  Mean Cell Hemoglobin Concentration : 30.8 g/dL  Auto Neutrophil # : 3.86 K/uL  Auto Lymphocyte # : 1.38 K/uL  Auto Monocyte # : 0.43 K/uL  Auto Eosinophil # : 0.15 K/uL  Auto Basophil # : 0.05 K/uL  Auto Neutrophil % : 65.1 %  Auto Lymphocyte % : 23.3 %  Auto Monocyte % : 7.3 %  Auto Eosinophil % : 2.5 %  Auto Basophil % : 0.8 %    BMP:    04-19 @ 04:27    Blood Urea Nitrogen - 9  Calcium - 8.4  Carbond Dioxide - 27  Chloride - 97  Creatinine - <0.5  Glucose - 116  Potassium - 4.2  Sodium - 137      Hemoglobin A1c -   PT/INR - ( 15 Apr 2024 17:58 )   PT: 13.80 sec;   INR: 1.21 ratio         PTT - ( 15 Apr 2024 17:58 )  PTT:29.9 sec  Urine Culture:  04-17 @ 11:00 Urine culture: --    Culture Results:   No growth at 24 hours  Method Type: --  Organism: --  Organism Identification: --  Specimen Source: .Blood None  04-15 @ 17:58 Urine culture: --    Culture Results:   No growth at 72 Hours  Method Type: PCR  Organism: Blood Culture PCR  Organism Identification: Blood Culture PCR  Specimen Source: .Blood Blood-Peripheral        COVID Labs  CRP:    Procalcitonin, Serum: 6.93 ng/mL (04-16-24 @ 07:03)    D-Dimer:      Fungitell: 81 pg/mL (03-13-24 @ 17:24)  Fungitell: 73 pg/mL (02-23-24 @ 18:19)  Fungitell: 76 pg/mL (02-19-24 @ 16:00)  Fungitell: 63 pg/mL (02-06-24 @ 11:27)  Fungitell: 65 pg/mL (02-06-24 @ 04:43)  Fungitell: 325 pg/mL (01-20-24 @ 21:23)  Fungitell: 138 pg/mL (11-07-23 @ 08:08)  Fungitell: 115 pg/mL (11-02-23 @ 11:40)    Fungitell: 81 pg/mL (03-13-24 @ 17:24)  Fungitell: 73 pg/mL (02-23-24 @ 18:19)  Fungitell: 76 pg/mL (02-19-24 @ 16:00)  Fungitell: 63 pg/mL (02-06-24 @ 11:27)  Fungitell: 65 pg/mL (02-06-24 @ 04:43)  Fungitell: 325 pg/mL (01-20-24 @ 21:23)  Fungitell: 138 pg/mL (11-07-23 @ 08:08)  Fungitell: 115 pg/mL (11-02-23 @ 11:40)      Imaging reviewed independently and reviewed read        MEDICATIONS  (STANDING):  albuterol/ipratropium for Nebulization 3 milliLiter(s) Nebulizer every 6 hours  AQUAPHOR (petrolatum Ointment) 1 Application(s) Topical two times a day  artificial  tears Solution 1 Drop(s) Both EYES two times a day  calcium carbonate   1250 mG (OsCal) 1 Tablet(s) Oral daily  chlorhexidine 2% Cloths 1 Application(s) Topical daily  enoxaparin Injectable 30 milliGRAM(s) SubCutaneous every 24 hours  famotidine    Tablet 20 milliGRAM(s) Oral two times a day  gabapentin Solution 300 milliGRAM(s) Oral two times a day  hydrocortisone 1% Cream 1 Application(s) Topical daily  levETIRAcetam  Solution 750 milliGRAM(s) Oral two times a day  meropenem  IVPB 1000 milliGRAM(s) IV Intermittent every 8 hours  midodrine 15 milliGRAM(s) Oral every 8 hours  norepinephrine Infusion 0.05 MICROgram(s)/kG/Min (4.01 mL/Hr) IV Continuous <Continuous>  raloxifene 60 milliGRAM(s) Oral daily  scopolamine 1 mG/72 Hr(s) Patch 1 Patch Transdermal every 72 hours  senna 2 Tablet(s) Oral at bedtime  silver sulfADIAZINE 1% Cream 1 Application(s) Topical two times a day    MEDICATIONS  (PRN):  acetaminophen     Tablet .. 650 milliGRAM(s) Oral every 6 hours PRN Temp greater or equal to 38C (100.4F), Mild Pain (1 - 3)  aluminum hydroxide/magnesium hydroxide/simethicone Suspension 30 milliLiter(s) Oral every 4 hours PRN Dyspepsia  melatonin 3 milliGRAM(s) Oral at bedtime PRN Insomnia  ondansetron Injectable 4 milliGRAM(s) IV Push every 8 hours PRN Nausea and/or Vomiting

## 2024-04-19 NOTE — CHART NOTE - NSCHARTNOTEFT_GEN_A_CORE
From SICU to Floor     hospital course:     Pt is a 58F w/ PMHx Down Syndrome, nonverbal at baseline, Hypothyroidism, Cerebral palsy and Seizure Disorders presents to the ED from \[facility]\"" for respiratory distress and high grade fever. Of note patient recently DC on 04/08/2024 with prolonged 3 month hospital course for similar presentation  On admission vitals: BP 74/55  RR 22 T103F sat 84% on RA placed on Bipap sat 100% on my examination  Labs WBC 12    Patient found to be septic on admission. Admitted to SDU for management of acute respiratory distress 2/2 recurrent PNA/ aspiration.     Patient was in SICU for septic shock 2/2 to PNA requiring pressors. Now off levophed since today morning, on 2 L NC.     ***Please note patient's baseline BP is soft 85-90 range with MAP > 60.     Assessment and Plan:     59yo F w/ pmhx of Down's syndrome, cerebral palsy, seizure disorder presents from [facility] for respiratory distress. Admitted to SDU for severe sepsis and acute hypoxemic respiratory failure likely secondary to recurrent CAP.     #acute hypoxemic respiratory failure   #Severe sepsis with septic shock(WBC 12.25, Tmax 104.4F, 84% RA; hypotensive initially SBP 70s + source)  #possible recurrent CAP   #chronic hypotension   # metabolic encephalopathy  - CXR: possible basilar opacities  - covid/RSV, MSRA, negative; Bl cx with contaminant   - C/w Midodrine, increase midodrine 15 mg TiD, now off levophed ( since 4/19 9 AM )   - target MAP 60   - c/w Alicia for total of 7 days   - Scopolamine for secretions. aspiration precautions   - F/u bcx; fungitell (previously positive s/p caspo), Urine for strep pneumonia/legionella antigen  - F/u palliative eval   - fungitell improving     # B/l feet ulcers  - No abscess/cellulitis  - colonized with ESBL strain  -Off loading to prevent pressure sores   - wound care following     #seizure disorder  #Down syndrome  #Nonverbal bedbound  # PEG tube   - c/w home meds    DVT ppx: lovenox  Code status: DNR/DNI, trial of NIV  Diet: NPO w/ peg feeds   Dispo: Floor

## 2024-04-19 NOTE — CONSULT NOTE ADULT - ASSESSMENT
Skin assessed- B/L  lateral and medial foot multiple  partial and full  thickness wounds - Wound base varying with pink, yellow and brown  to black devitalised skin tissue                  No odor, erythema, increased warmth, tenderness, induration, fluctuance, nor crepitus.                 R heel unstagable  pressure injury ~3x2 in size                Wound base marbleized with  pink and black devitalised skin tissue                   No odor, erythema, increased warmth, tenderness, induration, fluctuance, nor crepitus.                    B/l buttock moisture associated dermatitis with  erythema and denuded skin         Wound and skin care recs   Clean Lower extremity wounds with  vashe wound cleanser pat dry then apply silvadene with  non adhering and Kerlix dressing .  follow up with podiatry after discharge   Clean B/ L buttock with soap and water, Pat dry then apply triad hydrophilic dressing   Pressure  injury  preventive  measures  Skin  and incontinence care   Assess wound and inform primary provider of any changes   Case discussed with primary Rn  Wound/ ostomy specialist  to f/u as needed     Offloading: [ x] Frequent position changes [x ] Devices/Equipment  Cleansing: [ ] Saline [ ] Soap/Water [ ] Other: ______  Topicals: [ ] Barrier Cream [ ] Antimicrobial [ ] Enzymatic Wound Debridement [x] Moist  wound Healing   Dressings: [ ] Dry, sterile [ ] Allevyn  Foam [ ] Absorbant Pads [ ] Collagenase    Other Recs.   Per Primary team   Total time for bedside assessment  , review of medical records  and  discussion of plan of care with primary team greater than 35 min

## 2024-04-19 NOTE — PROGRESS NOTE ADULT - SUBJECTIVE AND OBJECTIVE BOX
Over Night Events: events noted, on NC, KUB reviewed    PHYSICAL EXAM    ICU Vital Signs Last 24 Hrs  T(C): 36.6 (19 Apr 2024 08:00), Max: 36.6 (19 Apr 2024 08:00)  T(F): 97.8 (19 Apr 2024 08:00), Max: 97.8 (19 Apr 2024 08:00)  HR: 111 (19 Apr 2024 08:00) (60 - 125)  BP: 146/81 (19 Apr 2024 08:00) (77/49 - 148/64)  BP(mean): 97 (19 Apr 2024 08:00) (57 - 97)  RR: 18 (19 Apr 2024 08:00) (18 - 20)  SpO2: 94% (19 Apr 2024 08:00) (83% - 100%)    O2 Parameters below as of 19 Apr 2024 07:00  Patient On (Oxygen Delivery Method): nasal cannula  O2 Flow (L/min): 3          General: ill looking  Lungs: Bilateral rhonchi  Cardiovascular: Regular   Abdomen: Soft, Positive BS, distended  Extremities: No clubbing   Neurological: Non focal       04-18-24 @ 07:01  -  04-19-24 @ 07:00  --------------------------------------------------------  IN:    Enteral Tube Flush: 300 mL    Jevity 1.2: 900 mL    Lactated Ringers Bolus: 500 mL    Norepinephrine: 107.6 mL  Total IN: 1807.6 mL    OUT:    Voided (mL): 1125 mL  Total OUT: 1125 mL    Total NET: 682.6 mL          LABS:                          8.8    5.93  )-----------( 334      ( 19 Apr 2024 04:27 )             28.6                                               04-19    137  |  97<L>  |  9<L>  ----------------------------<  116<H>  4.2   |  27  |  <0.5<L>    Ca    8.4      19 Apr 2024 04:27  Mg     2.0     04-18                                               Urinalysis Basic - ( 19 Apr 2024 04:27 )    Color: x / Appearance: x / SG: x / pH: x  Gluc: 116 mg/dL / Ketone: x  / Bili: x / Urobili: x   Blood: x / Protein: x / Nitrite: x   Leuk Esterase: x / RBC: x / WBC x   Sq Epi: x / Non Sq Epi: x / Bacteria: x                                                                                           Culture - Blood (collected 17 Apr 2024 11:00)  Source: .Blood None  Preliminary Report (18 Apr 2024 21:01):    No growth at 24 hours                                                                                           MEDICATIONS  (STANDING):  albuterol/ipratropium for Nebulization 3 milliLiter(s) Nebulizer every 6 hours  AQUAPHOR (petrolatum Ointment) 1 Application(s) Topical two times a day  artificial  tears Solution 1 Drop(s) Both EYES two times a day  calcium carbonate   1250 mG (OsCal) 1 Tablet(s) Oral daily  chlorhexidine 2% Cloths 1 Application(s) Topical daily  enoxaparin Injectable 30 milliGRAM(s) SubCutaneous every 24 hours  famotidine    Tablet 20 milliGRAM(s) Oral two times a day  gabapentin Solution 300 milliGRAM(s) Oral two times a day  hydrocortisone 1% Cream 1 Application(s) Topical daily  levETIRAcetam  Solution 750 milliGRAM(s) Oral two times a day  meropenem  IVPB 1000 milliGRAM(s) IV Intermittent every 8 hours  midodrine 15 milliGRAM(s) Oral every 8 hours  norepinephrine Infusion 0.05 MICROgram(s)/kG/Min (4.01 mL/Hr) IV Continuous <Continuous>  raloxifene 60 milliGRAM(s) Oral daily  scopolamine 1 mG/72 Hr(s) Patch 1 Patch Transdermal every 72 hours  senna 2 Tablet(s) Oral at bedtime  silver sulfADIAZINE 1% Cream 1 Application(s) Topical two times a day    MEDICATIONS  (PRN):  acetaminophen     Tablet .. 650 milliGRAM(s) Oral every 6 hours PRN Temp greater or equal to 38C (100.4F), Mild Pain (1 - 3)  aluminum hydroxide/magnesium hydroxide/simethicone Suspension 30 milliLiter(s) Oral every 4 hours PRN Dyspepsia  melatonin 3 milliGRAM(s) Oral at bedtime PRN Insomnia  ondansetron Injectable 4 milliGRAM(s) IV Push every 8 hours PRN Nausea and/or Vomiting

## 2024-04-19 NOTE — PROGRESS NOTE ADULT - SUBJECTIVE AND OBJECTIVE BOX
HPI: 58F with PMH including Down's syndrome, cerebral palsy, and seizure disorder, here from [facility] with respiratory distress and acute respiratory failure, found to have septic shock from CAP on IV abx and pressors. Palliative consulted for GOC. DNR/DNI.     INTERVAL EVENTS  4/19: patient appears comfortable    ADVANCE DIRECTIVES:    [ ] Full Code [X ] DNR  MOLST  [ ]  Living Will  [ ]   DECISION MAKER(s):  [ ] Health Care Proxy(s)  [ ] Surrogate(s)  [ ] Guardian           Name(s): Phone Number(s): Corina RIVAS    BASELINE (I)ADL(s) (prior to admission):  [place]: [ ]Total  [ ] Moderate [ ]Dependent  Palliative Performance Status Version 2:         %    http://npcrc.org/files/news/palliative_performance_scale_[email or web address].pdf    Allergies    No Known Allergies    Intolerances    MEDICATIONS  (STANDING):  albuterol/ipratropium for Nebulization 3 milliLiter(s) Nebulizer every 6 hours  AQUAPHOR (petrolatum Ointment) 1 Application(s) Topical two times a day  artificial  tears Solution 1 Drop(s) Both EYES two times a day  calcium carbonate   1250 mG (OsCal) 1 Tablet(s) Oral daily  chlorhexidine 2% Cloths 1 Application(s) Topical daily  enoxaparin Injectable 30 milliGRAM(s) SubCutaneous every 24 hours  famotidine    Tablet 20 milliGRAM(s) Oral two times a day  gabapentin Solution 300 milliGRAM(s) Oral two times a day  hydrocortisone 1% Cream 1 Application(s) Topical daily  levETIRAcetam  Solution 750 milliGRAM(s) Oral two times a day  meropenem  IVPB 1000 milliGRAM(s) IV Intermittent every 8 hours  midodrine 15 milliGRAM(s) Oral every 8 hours  norepinephrine Infusion 0.05 MICROgram(s)/kG/Min (4.01 mL/Hr) IV Continuous <Continuous>  raloxifene 60 milliGRAM(s) Oral daily  scopolamine 1 mG/72 Hr(s) Patch 1 Patch Transdermal every 72 hours  senna 2 Tablet(s) Oral at bedtime  silver sulfADIAZINE 1% Cream 1 Application(s) Topical two times a day    MEDICATIONS  (PRN):  acetaminophen     Tablet .. 650 milliGRAM(s) Oral every 6 hours PRN Temp greater or equal to 38C (100.4F), Mild Pain (1 - 3)  aluminum hydroxide/magnesium hydroxide/simethicone Suspension 30 milliLiter(s) Oral every 4 hours PRN Dyspepsia  melatonin 3 milliGRAM(s) Oral at bedtime PRN Insomnia  ondansetron Injectable 4 milliGRAM(s) IV Push every 8 hours PRN Nausea and/or Vomiting        PRESENT SYMPTOMS: [X ]Unable to obtain due to poor mentation   Source if other than patient:  [ ]Family   [ ]Team   [ ]All review of systems negative including pain and dyspnea unless noted below    All components of pain assessment below addressed. Blank spaces indicate that the patient did/could not complete the assessment.  Pain: [ ]yes [ ]no  QOL impact -   Location -                    Aggravating factors -  Quality -  Radiation -  Timing-  Severity (0-10 scale):  Minimal acceptable level (0-10 scale):     CPOT:    https://www.[email or web address]/getattachment/[email or web address]/Critical-Care-Pain-Observation-Tool-(CPOT)    PAIN AD Score: 0  [email or web address] (press ctrl +  left click to view)    Dyspnea:                           [ ]None[ ]Mild [ ]Moderate [ ]Severe     Respiratory Distress Observation Scale (RDOS): 1  A score of 0 to 2 signifies little or no respiratory distress, 3 signifies mild distress, scores 4 to 6 indicate moderate distress, and scores greater than 7 signify severe distress  [email or web address]-[email or web address]-[email or web address]-[email or web address]    Anxiety:                             [ ]None[ ]Mild [ ]Moderate [ ]Severe   Fatigue:                             [ ]None[ ]Mild [ ]Moderate [ ]Severe   Nausea:                             [ ]None[ ]Mild [ ]Moderate [ ]Severe   Loss of appetite:              [ ]None[ ]Mild [ ]Moderate [ ]Severe   Constipation:                    [ ]None[ ]Mild [ ]Moderate [ ]Severe    Other Symptoms:      Palliative Performance Status Version 2:         %    http://[email or web address]/files/news/palliative_performance_scale_[email or web address].pdf  PHYSICAL EXAM:  Vital Signs Last 24 Hrs  T(C): 36.6 (19 Apr 2024 08:00), Max: 36.6 (19 Apr 2024 08:00)  T(F): 97.8 (19 Apr 2024 08:00), Max: 97.8 (19 Apr 2024 08:00)  HR: 115 (19 Apr 2024 12:00) (60 - 125)  BP: 146/81 (19 Apr 2024 08:00) (77/49 - 148/64)  BP(mean): 97 (19 Apr 2024 08:00) (57 - 97)  RR: 18 (19 Apr 2024 08:00) (18 - 20)  SpO2: 95% (19 Apr 2024 12:00) (83% - 100%)    Parameters below as of 19 Apr 2024 12:00  Patient On (Oxygen Delivery Method): nasal cannula  O2 Flow (L/min): 3    GENERAL:  [X ] No acute distress [ ]Lethargic  [ ]Unarousable  [ ]Verbal  [ ]Non-Verbal [ ]Cachexia    BEHAVIORAL/PSYCH:  [ ]Alert and Oriented x  [ ] Anxiety [ ] Delirium [ ] Agitation [X ] Calm   EYES: [ X] No scleral icterus [ ] Scleral icterus [ ] Closed  ENMT:  [ ]Dry mouth  [ ]No external oral lesions [ X] No external ear or nose lesions  CARDIOVASCULAR:  [ ]Regular [ ]Irregular [ ]Tachy [ ]Not Tachy  [ ]Raheem [ ] Edema [ ] No edema  PULMONARY:  [ ]Tachypnea  [ ]Audible excessive secretions [X ] No labored breathing [ ] labored breathing  GASTROINTESTINAL: [ ]Soft  [ ]Distended  [ X]Not distended [ ]Non tender [ ]Tender  MUSCULOSKELETAL: [ ]No clubbing [ ] clubbing  [ X] No cyanosis [ ] cyanosis  NEUROLOGIC: [ ]No focal deficits  [ ]Follows commands  [ ]Does not follow commands  [X ]Cognitive impairment  [ ]Dysphagia  [ ]Dysarthria  [ ]Paresis   SKIN: [ ] Jaundiced [X ] Non-jaundiced [ ]Rash [ ]No Rash [ ] Warm [ ] Dry  MISC/LINES: [ ] ET tube [ ] Trach [ ]NGT/OGT [ ]PEG [ ]Madsen    CRITICAL CARE:  [ ] Shock Present  [ ]Septic [ ]Cardiogenic [ ]Neurologic [ ]Hypovolemic  [ ]  Vasopressors [ ]  Inotropes   [ ]Respiratory failure present [ ]Mechanical ventilation [ ]Non-invasive ventilatory support [ ]High flow  [ ]Acute  [ ]Chronic [ ]Hypoxic  [ ]Hypercarbic [ ]Other  [ ]Other organ failure     LABS: reviewed by me, WNL                          8.8    5.93  )-----------( 334      ( 19 Apr 2024 04:27 )             28.6     04-19    137  |  97<L>  |  9<L>  ----------------------------<  116<H>  4.2   |  27  |  <0.5<L>    Ca    8.4      19 Apr 2024 04:27  Mg     2.0     04-18        RADIOLOGY & ADDITIONAL STUDIES: reviewed by me  CXR appears clear per my read    PROTEIN CALORIE MALNUTRITION PRESENT: [ ]mild [ ]moderate [ ]severe [ ]underweight [ ]morbid obesity  https://www.andeal.org/vault/2440/web/files/ONC/Table_Clinical%20Characteristics%20to%20Document%20Malnutrition-White%20JV%20et%20al%202012.pdf    Height (cm): 134 (04-15-24 @ 16:54), 134 (04-11-24 @ 13:16), 134 (04-02-24 @ 12:18)  Weight (kg): 42.728 (04-15-24 @ 17:00), 52 (04-02-24 @ 12:18), 60 (11-17-23 @ 15:00)  BMI (kg/m2): 23.8 (04-15-24 @ 17:00), 29 (04-15-24 @ 16:54), 29 (04-11-24 @ 13:16)    [ ]PPSV2 < or = to 30% [ ]significant weight loss  [ ]poor nutritional intake  [ ]anasarca      [ ]Artificial Nutrition          Palliative Care Spiritual/Emotional Screening Tool Question  Severity (0-4):                    OR                    [X ] Unable to determine/NA  Score of 2 or greater indicates recommendation of Chaplaincy referral  Chaplaincy Referral: [ ] Yes [ ] Refused [ ] Following     Caregiver [place]:  [ ] Yes [ ] No    OR    [x ] Unable to determine. Will assess at later time if appropriate.  Social Work Referral [ ]  Patient and Family Centered Care Referral [ ]    Anticipatory Grief Present: [ ] Yes [ ] No    OR     [ x] Unable to determine. Will assess at later time if appropriate.  Social Work Referral [ ]  Patient and Family Centered Care Referral [ ]    REFERRALS:   [ ]Chaplaincy  [ ]Hospice  [ ]Child Life  [ ]Social Work  [ ]Case management [ ]Holistic Therapy     Palliative care education provided to patient and/or family    Goals of Care Document:     ______________  Wu Jenkins MD  Palliative Medicine  [facility]   of Geriatric and Palliative Medicine  [phone]

## 2024-04-20 LAB
ANION GAP SERPL CALC-SCNC: 11 MMOL/L — SIGNIFICANT CHANGE UP (ref 7–14)
BASOPHILS # BLD AUTO: 0.06 K/UL — SIGNIFICANT CHANGE UP (ref 0–0.2)
BASOPHILS NFR BLD AUTO: 0.8 % — SIGNIFICANT CHANGE UP (ref 0–1)
BUN SERPL-MCNC: 10 MG/DL — SIGNIFICANT CHANGE UP (ref 10–20)
CALCIUM SERPL-MCNC: 8.9 MG/DL — SIGNIFICANT CHANGE UP (ref 8.4–10.5)
CHLORIDE SERPL-SCNC: 101 MMOL/L — SIGNIFICANT CHANGE UP (ref 98–110)
CO2 SERPL-SCNC: 29 MMOL/L — SIGNIFICANT CHANGE UP (ref 17–32)
CREAT SERPL-MCNC: <0.5 MG/DL — LOW (ref 0.7–1.5)
CULTURE RESULTS: SIGNIFICANT CHANGE UP
EGFR: 115 ML/MIN/1.73M2 — SIGNIFICANT CHANGE UP
EOSINOPHIL # BLD AUTO: 0.23 K/UL — SIGNIFICANT CHANGE UP (ref 0–0.7)
EOSINOPHIL NFR BLD AUTO: 3.1 % — SIGNIFICANT CHANGE UP (ref 0–8)
GLUCOSE BLDC GLUCOMTR-MCNC: 119 MG/DL — HIGH (ref 70–99)
GLUCOSE SERPL-MCNC: 112 MG/DL — HIGH (ref 70–99)
HCT VFR BLD CALC: 29.8 % — LOW (ref 37–47)
HGB BLD-MCNC: 9 G/DL — LOW (ref 12–16)
IMM GRANULOCYTES NFR BLD AUTO: 1 % — HIGH (ref 0.1–0.3)
LYMPHOCYTES # BLD AUTO: 1.87 K/UL — SIGNIFICANT CHANGE UP (ref 1.2–3.4)
LYMPHOCYTES # BLD AUTO: 25.5 % — SIGNIFICANT CHANGE UP (ref 20.5–51.1)
MCHC RBC-ENTMCNC: 27.4 PG — SIGNIFICANT CHANGE UP (ref 27–31)
MCHC RBC-ENTMCNC: 30.2 G/DL — LOW (ref 32–37)
MCV RBC AUTO: 90.9 FL — SIGNIFICANT CHANGE UP (ref 81–99)
MONOCYTES # BLD AUTO: 0.48 K/UL — SIGNIFICANT CHANGE UP (ref 0.1–0.6)
MONOCYTES NFR BLD AUTO: 6.6 % — SIGNIFICANT CHANGE UP (ref 1.7–9.3)
NEUTROPHILS # BLD AUTO: 4.61 K/UL — SIGNIFICANT CHANGE UP (ref 1.4–6.5)
NEUTROPHILS NFR BLD AUTO: 63 % — SIGNIFICANT CHANGE UP (ref 42.2–75.2)
NRBC # BLD: 0 /100 WBCS — SIGNIFICANT CHANGE UP (ref 0–0)
PLATELET # BLD AUTO: 383 K/UL — SIGNIFICANT CHANGE UP (ref 130–400)
PMV BLD: 10.2 FL — SIGNIFICANT CHANGE UP (ref 7.4–10.4)
POTASSIUM SERPL-MCNC: 4.7 MMOL/L — SIGNIFICANT CHANGE UP (ref 3.5–5)
POTASSIUM SERPL-SCNC: 4.7 MMOL/L — SIGNIFICANT CHANGE UP (ref 3.5–5)
RBC # BLD: 3.28 M/UL — LOW (ref 4.2–5.4)
RBC # FLD: 22.2 % — HIGH (ref 11.5–14.5)
SODIUM SERPL-SCNC: 141 MMOL/L — SIGNIFICANT CHANGE UP (ref 135–146)
SPECIMEN SOURCE: SIGNIFICANT CHANGE UP
WBC # BLD: 7.32 K/UL — SIGNIFICANT CHANGE UP (ref 4.8–10.8)
WBC # FLD AUTO: 7.32 K/UL — SIGNIFICANT CHANGE UP (ref 4.8–10.8)

## 2024-04-20 PROCEDURE — 99232 SBSQ HOSP IP/OBS MODERATE 35: CPT

## 2024-04-20 RX ORDER — MIDODRINE HYDROCHLORIDE 2.5 MG/1
10 TABLET ORAL EVERY 8 HOURS
Refills: 0 | Status: DISCONTINUED | OUTPATIENT
Start: 2024-04-20 | End: 2024-04-22

## 2024-04-20 RX ORDER — SODIUM CHLORIDE 9 MG/ML
500 INJECTION, SOLUTION INTRAVENOUS ONCE
Refills: 0 | Status: DISCONTINUED | OUTPATIENT
Start: 2024-04-20 | End: 2024-04-25

## 2024-04-20 RX ADMIN — MIDODRINE HYDROCHLORIDE 10 MILLIGRAM(S): 2.5 TABLET ORAL at 13:29

## 2024-04-20 RX ADMIN — Medication 1 APPLICATION(S): at 18:19

## 2024-04-20 RX ADMIN — MIDODRINE HYDROCHLORIDE 10 MILLIGRAM(S): 2.5 TABLET ORAL at 21:32

## 2024-04-20 RX ADMIN — Medication 1 APPLICATION(S): at 18:18

## 2024-04-20 RX ADMIN — Medication 3 MILLILITER(S): at 14:09

## 2024-04-20 RX ADMIN — FAMOTIDINE 20 MILLIGRAM(S): 10 INJECTION INTRAVENOUS at 06:12

## 2024-04-20 RX ADMIN — LEVETIRACETAM 750 MILLIGRAM(S): 250 TABLET, FILM COATED ORAL at 18:18

## 2024-04-20 RX ADMIN — LEVETIRACETAM 750 MILLIGRAM(S): 250 TABLET, FILM COATED ORAL at 06:13

## 2024-04-20 RX ADMIN — Medication 1 APPLICATION(S): at 12:33

## 2024-04-20 RX ADMIN — MEROPENEM 100 MILLIGRAM(S): 1 INJECTION INTRAVENOUS at 06:11

## 2024-04-20 RX ADMIN — SENNA PLUS 2 TABLET(S): 8.6 TABLET ORAL at 21:32

## 2024-04-20 RX ADMIN — GABAPENTIN 300 MILLIGRAM(S): 400 CAPSULE ORAL at 18:39

## 2024-04-20 RX ADMIN — MEROPENEM 100 MILLIGRAM(S): 1 INJECTION INTRAVENOUS at 13:28

## 2024-04-20 RX ADMIN — Medication 1 APPLICATION(S): at 06:15

## 2024-04-20 RX ADMIN — SCOPALAMINE 1 PATCH: 1 PATCH, EXTENDED RELEASE TRANSDERMAL at 09:26

## 2024-04-20 RX ADMIN — FAMOTIDINE 20 MILLIGRAM(S): 10 INJECTION INTRAVENOUS at 18:18

## 2024-04-20 RX ADMIN — MIDODRINE HYDROCHLORIDE 15 MILLIGRAM(S): 2.5 TABLET ORAL at 06:13

## 2024-04-20 RX ADMIN — CHLORHEXIDINE GLUCONATE 1 APPLICATION(S): 213 SOLUTION TOPICAL at 12:36

## 2024-04-20 RX ADMIN — Medication 1 DROP(S): at 06:15

## 2024-04-20 RX ADMIN — GABAPENTIN 300 MILLIGRAM(S): 400 CAPSULE ORAL at 07:10

## 2024-04-20 RX ADMIN — Medication 3 MILLILITER(S): at 19:55

## 2024-04-20 RX ADMIN — ENOXAPARIN SODIUM 30 MILLIGRAM(S): 100 INJECTION SUBCUTANEOUS at 13:28

## 2024-04-20 RX ADMIN — Medication 3 MILLILITER(S): at 07:41

## 2024-04-20 RX ADMIN — Medication 1 TABLET(S): at 15:45

## 2024-04-20 RX ADMIN — Medication 1 APPLICATION(S): at 07:10

## 2024-04-20 RX ADMIN — RALOXIFENE HYDROCHLORIDE 60 MILLIGRAM(S): 60 TABLET, COATED ORAL at 12:34

## 2024-04-20 RX ADMIN — Medication 1 DROP(S): at 18:19

## 2024-04-20 RX ADMIN — MEROPENEM 100 MILLIGRAM(S): 1 INJECTION INTRAVENOUS at 21:32

## 2024-04-20 NOTE — DIETITIAN INITIAL EVALUATION ADULT - PHYSCIAL ASSESSMENT
No physical findings noted  Pt's wt has drastically changed in the last 3 months, however, no other evidence at this time other than change in wt to diagnose PCM. Only 1 criteria for PCM met at this time

## 2024-04-20 NOTE — DIETITIAN INITIAL EVALUATION ADULT - ENERGY INTAKE
At admit, per RN, pt has been tolerating feeds without difficulty. Current TF regimen provides: 1440kcal,66g PRO, 972mL free H2O.

## 2024-04-20 NOTE — PROGRESS NOTE ADULT - NSPROGADDITIONALINFOA_GEN_ALL_CORE
Culture - Blood in AM (04.19.24 @ 04:27)    Specimen Source: .Blood Blood    Culture Results:   No growth at 24 hours    Culture - Blood (04.15.24 @ 17:58)    -  Coagulase negative Staphylococcus: Detec    Gram Stain:   Growth in aerobic bottle: Gram positive cocci in pairs    Specimen Source: .Blood Blood-Peripheral    Organism: Blood Culture PCR    Culture Results:   Growth in aerobic bottle: Staphylococcus capitis  Isolation of Coagulase negative Staphylococcus from single blood culture  sets may represent  contamination. Contact the Microbiology Department at [phone] if  susceptibility testing is  clinically indicated.  Direct identification is available within approximately 3-5  hours either by Blood Panel Multiplexed PCR or Direct  MALDI-TOF. Details: [email or web address]    Organism Identification: Blood Culture PCR    Method Type: PCR

## 2024-04-20 NOTE — DIETITIAN INITIAL EVALUATION ADULT - ENTERAL
Nutrition Intervention: EN, coordination of care   Nutrition Monitoring:diet order,energy intake,body composition,NFPF, lytes, GI (BM), BG

## 2024-04-20 NOTE — DIETITIAN INITIAL EVALUATION ADULT - ADD RECOMMEND
1. Pt is current exceeding caloric needs with current regimen -- please change TF formula to bolus feeds Jevity 1.2 @360mL q8h -- provides: 1296kcal, 59g PRO, 875mL free H2O.   2. Maintain all aspiration precautions   3. Monitor tolerance and GI s/s   4. maintain HOB >45 1. Pt is current exceeding caloric needs with current regimen -- please change TF formula to bolus feeds Jevity 1.2 @360mL q8h -- provides: 1296kcal, 59g PRO, 875mL free H2O. Additional Flushes: 70cc PRE/POST feeds  2. Maintain all aspiration precautions   3. Monitor tolerance and GI s/s   4. maintain HOB >45

## 2024-04-20 NOTE — DIETITIAN INITIAL EVALUATION ADULT - OTHER CALCULATIONS
Energy: 1013-1266kcal/day (using 1.2-1.5AF due to PI vs. suspected PCM)   Protein: 54-68g/day (using 1.2-1.5g/kg -- same reason as above)   Fluid: 1mL/kcal/day

## 2024-04-20 NOTE — DIETITIAN INITIAL EVALUATION ADULT - ORAL INTAKE PTA/DIET HISTORY
Pt Unable to provide hx as she is unable to speak. State aid at bedside, but did not know pt personally. Obtained info from pt's binder at bedside. Pt on Jevity 1.2 240mL 5x/day. Additional flushes 100cc pre/post feeds. NKA noted. No ht/wt data. Wt at admit: 44.7kg per RD bedscale vs. EMR wt 42.7kg. Both wts are much lower than previous admission wts.   Nutrition hx obtained at previous visit 10/17/23: Nutrition hx obtained from patient's aide from group home present at bedside. Patient was on a puree diet with thin liquids. She was able to feed herself and ate very well. Patient's weight hx at the group home: From January 2023 to Now weight fluctuated from 115 - 119 lbs. During last hospitalization she was 123 lbs (August 2023). NKFA, no food intolerances.   Previous admission wts:   3/25/23: 47.2kg   9/30/23: 55.3kg  10/17/23: 49.9kg   1/24/24: 52.3kg

## 2024-04-20 NOTE — PROGRESS NOTE ADULT - SUBJECTIVE AND OBJECTIVE BOX
24H events:    Patient is a 58y old Female who presents with a chief complaint of Pneumonia (19 Apr 2024 15:28)  Primary diagnosis of Acute sepsis        Today is 5d of hospitalization. This morning patient was seen and examined at bedside, resting comfortably in bed.    No acute or major events overnight.    Code Status:    Family communication:  Contact date:  Name of person contacted:  Relationship to patient:  Communication details:  What matters most:    PAST MEDICAL & SURGICAL HISTORY  Down syndrome    Osteoporosis    Mild anemia    Neuropathy    S/P debridement  of R hip on 3/2/21      SOCIAL HISTORY:  Social History:      ALLERGIES:  No Known Allergies    MEDICATIONS:  STANDING MEDICATIONS  albuterol/ipratropium for Nebulization 3 milliLiter(s) Nebulizer every 6 hours  AQUAPHOR (petrolatum Ointment) 1 Application(s) Topical two times a day  artificial  tears Solution 1 Drop(s) Both EYES two times a day  calcium carbonate   1250 mG (OsCal) 1 Tablet(s) Oral daily  chlorhexidine 2% Cloths 1 Application(s) Topical daily  enoxaparin Injectable 30 milliGRAM(s) SubCutaneous every 24 hours  famotidine    Tablet 20 milliGRAM(s) Oral two times a day  gabapentin Solution 300 milliGRAM(s) Oral two times a day  hydrocortisone 1% Cream 1 Application(s) Topical daily  levETIRAcetam  Solution 750 milliGRAM(s) Oral two times a day  meropenem  IVPB 1000 milliGRAM(s) IV Intermittent every 8 hours  midodrine 15 milliGRAM(s) Oral every 8 hours  raloxifene 60 milliGRAM(s) Oral daily  scopolamine 1 mG/72 Hr(s) Patch 1 Patch Transdermal every 72 hours  senna 2 Tablet(s) Oral at bedtime  silver sulfADIAZINE 1% Cream 1 Application(s) Topical two times a day    PRN MEDICATIONS  acetaminophen     Tablet .. 650 milliGRAM(s) Oral every 6 hours PRN  aluminum hydroxide/magnesium hydroxide/simethicone Suspension 30 milliLiter(s) Oral every 4 hours PRN  melatonin 3 milliGRAM(s) Oral at bedtime PRN  ondansetron Injectable 4 milliGRAM(s) IV Push every 8 hours PRN    VITALS:   T(F): 97.3  HR: 73  BP: 111/55  RR: 18  SpO2: 97%    PHYSICAL EXAM:  GENERAL:   ( x) NAD, lying in bed comfortably     (  ) obtunded     (  ) lethargic     (  ) somnolent  Nonverbal    HEART:  Rate -->     (x) normal rate     (  ) bradycardic     (  ) tachycardic  Rhythm -->     (x) regular     (  ) regularly irregular     (  ) irregularly irregular  Murmurs -->     (x) normal s1s2     (  ) systolic murmur     (  ) diastolic murmur     (  ) continuous murmur      (  ) S3 present     (  ) S4 present    LUNGS:   ( x)Unlabored respirations     (  ) tachypnea  ( x) B/L air entry     (  ) decreased breath sounds in:  (location     )    ( x) no adventitious sound     (  ) crackles     (  ) wheezing      (  ) rhonchi      (specify location:       )  (  ) chest wall tenderness (specify location:       )    ABDOMEN:   ( x) Soft     (  ) tense   |   (  ) nondistended     (  ) distended   |   (  ) +BS     (  ) hypoactive bowel sounds     (  ) hyperactive bowel sounds  ( x) nontender     (  ) RUQ tenderness     (  ) RLQ tenderness     (  ) LLQ tenderness     (  ) epigastric tenderness     (  ) diffuse tenderness  (  ) Splenomegaly      (  ) Hepatomegaly      (  ) Jaundice     (  ) ecchymosis     EXTREMITIES:  contracted    NERVOUS SYSTEM:    Awake, nonverbal           LABS:                        9.0    7.32  )-----------( 383      ( 20 Apr 2024 07:36 )             29.8     04-20    141  |  101  |  10  ----------------------------<  112<H>  4.7   |  29  |  <0.5<L>    Ca    8.9      20 Apr 2024 07:36        Urinalysis Basic - ( 20 Apr 2024 07:36 )    Color: x / Appearance: x / SG: x / pH: x  Gluc: 112 mg/dL / Ketone: x  / Bili: x / Urobili: x   Blood: x / Protein: x / Nitrite: x   Leuk Esterase: x / RBC: x / WBC x   Sq Epi: x / Non Sq Epi: x / Bacteria: x            Culture - Blood (collected 17 Apr 2024 11:00)  Source: .Blood None  Preliminary Report (19 Apr 2024 21:02):    No growth at 48 Hours          RADIOLOGY:    ASSESSMENT AND PLAN  59yo F w/ pmhx of Down's syndrome, cerebral palsy, seizure disorder presents from [facility] for respiratory distress. Admitted to SDU for severe sepsis and acute hypoxemic respiratory failure likely secondary to recurrent CAP.     #acute hypoxemic respiratory failure   #Severe sepsis with septic shock(WBC 12.25, Tmax 104.4F, 84% RA; hypotensive initially SBP 70s + source)  #possible recurrent CAP   #chronic hypotension   # metabolic encephalopathy  - CXR: possible basilar opacities  - covid/RSV, MSRA, negative; Bl cx with contaminant   - C/w Midodrine, increase midodrine 15 mg TiD, now off levophed ( since 4/19 9 AM )   - target MAP 60   - blood cx 4/15: staph capitis  - blood cx 4/15 and 4/17: NGTD  - c/w Alicia for total of 7 days  (4/16 - )  - Scopolamine for secretions. aspiration precautions   - fungitell improving   - Palliative care following  - F/u bcx; fungitell (previously positive s/p caspo), Urine for strep pneumonia/legionella antigen    # B/l feet ulcers  - No abscess/cellulitis  - colonized with ESBL strain  -Off loading to prevent pressure sores   - wound care following     #seizure disorder  #Down syndrome  #Nonverbal bedbound  # PEG tube   - c/w home meds    DVT ppx: lovenox  Code status: DNR/DNI, trial of NIV  Diet: NPO w/ peg feeds   Dispo: Floor.     24H events:    Patient is a 58y old Female who presents with a chief complaint of Pneumonia (19 Apr 2024 15:28)  Primary diagnosis of Acute sepsis        Today is 5d of hospitalization. This morning patient was seen and examined at bedside, resting comfortably in bed.    No acute or major events overnight.    Code Status:       PAST MEDICAL & SURGICAL HISTORY  Down syndrome    Osteoporosis    Mild anemia    Neuropathy    S/P debridement  of R hip on 3/2/21      SOCIAL HISTORY:  Social History:      ALLERGIES:  No Known Allergies    MEDICATIONS:  STANDING MEDICATIONS  albuterol/ipratropium for Nebulization 3 milliLiter(s) Nebulizer every 6 hours  AQUAPHOR (petrolatum Ointment) 1 Application(s) Topical two times a day  artificial  tears Solution 1 Drop(s) Both EYES two times a day  calcium carbonate   1250 mG (OsCal) 1 Tablet(s) Oral daily  chlorhexidine 2% Cloths 1 Application(s) Topical daily  enoxaparin Injectable 30 milliGRAM(s) SubCutaneous every 24 hours  famotidine    Tablet 20 milliGRAM(s) Oral two times a day  gabapentin Solution 300 milliGRAM(s) Oral two times a day  hydrocortisone 1% Cream 1 Application(s) Topical daily  levETIRAcetam  Solution 750 milliGRAM(s) Oral two times a day  meropenem  IVPB 1000 milliGRAM(s) IV Intermittent every 8 hours  midodrine 15 milliGRAM(s) Oral every 8 hours  raloxifene 60 milliGRAM(s) Oral daily  scopolamine 1 mG/72 Hr(s) Patch 1 Patch Transdermal every 72 hours  senna 2 Tablet(s) Oral at bedtime  silver sulfADIAZINE 1% Cream 1 Application(s) Topical two times a day    PRN MEDICATIONS  acetaminophen     Tablet .. 650 milliGRAM(s) Oral every 6 hours PRN  aluminum hydroxide/magnesium hydroxide/simethicone Suspension 30 milliLiter(s) Oral every 4 hours PRN  melatonin 3 milliGRAM(s) Oral at bedtime PRN  ondansetron Injectable 4 milliGRAM(s) IV Push every 8 hours PRN    VITALS:   T(F): 97.3  HR: 73  BP: 111/55  RR: 18  SpO2: 97%    PHYSICAL EXAM:  GENERAL:   ( x) NAD, lying in bed comfortably     (  ) obtunded     (  ) lethargic     (  ) somnolent  Nonverbal    HEART:  Rate -->     (x) normal rate     (  ) bradycardic     (  ) tachycardic  Rhythm -->     (x) regular     (  ) regularly irregular     (  ) irregularly irregular  Murmurs -->     (x) normal s1s2     (  ) systolic murmur     (  ) diastolic murmur     (  ) continuous murmur      (  ) S3 present     (  ) S4 present    LUNGS:   ( x)Unlabored respirations     (  ) tachypnea  ( x) B/L air entry     (  ) decreased breath sounds in:  (location     )    ( x) no adventitious sound     (  ) crackles     (  ) wheezing      (  ) rhonchi      (specify location:       )  (  ) chest wall tenderness (specify location:       )    ABDOMEN:   ( x) Soft     (  ) tense   |   (  ) nondistended     (  ) distended   |   (  ) +BS     (  ) hypoactive bowel sounds     (  ) hyperactive bowel sounds  ( x) nontender     (  ) RUQ tenderness     (  ) RLQ tenderness     (  ) LLQ tenderness     (  ) epigastric tenderness     (  ) diffuse tenderness  (  ) Splenomegaly      (  ) Hepatomegaly      (  ) Jaundice     (  ) ecchymosis     EXTREMITIES:  contracted    NERVOUS SYSTEM:    Awake, nonverbal           LABS:                        9.0    7.32  )-----------( 383      ( 20 Apr 2024 07:36 )             29.8     04-20    141  |  101  |  10  ----------------------------<  112<H>  4.7   |  29  |  <0.5<L>    Ca    8.9      20 Apr 2024 07:36        Urinalysis Basic - ( 20 Apr 2024 07:36 )    Color: x / Appearance: x / SG: x / pH: x  Gluc: 112 mg/dL / Ketone: x  / Bili: x / Urobili: x   Blood: x / Protein: x / Nitrite: x   Leuk Esterase: x / RBC: x / WBC x   Sq Epi: x / Non Sq Epi: x / Bacteria: x            Culture - Blood (collected 17 Apr 2024 11:00)  Source: .Blood None  Preliminary Report (19 Apr 2024 21:02):    No growth at 48 Hours          RADIOLOGY:    ASSESSMENT AND PLAN  59yo F w/ pmhx of Down's syndrome, cerebral palsy, seizure disorder presents from [facility] for respiratory distress. Admitted to SDU for severe sepsis and acute hypoxemic respiratory failure likely secondary to recurrent CAP.     #acute hypoxemic respiratory failure   #Severe sepsis with septic shock(WBC 12.25, Tmax 104.4F, 84% RA; hypotensive initially SBP 70s + source)  #possible recurrent CAP   #chronic hypotension   # metabolic encephalopathy  - CXR: possible basilar opacities  - covid/RSV, MSRA, negative; Bl cx with contaminant   - C/w Midodrine, increase midodrine 15 mg TiD, now off levophed ( since 4/19 9 AM )   - target MAP 60   - blood cx 4/15: staph capitis  - blood cx 4/15 and 4/17: NGTD  - c/w Alicia for total of 7 days  (4/16 - )  - Scopolamine for secretions. aspiration precautions   - fungitell improving   - Palliative care following  - F/u bcx; fungitell (previously positive s/p caspo), Urine for strep pneumonia/legionella antigen    # B/l feet ulcers  - No abscess/cellulitis  - colonized with ESBL strain  -Off loading to prevent pressure sores   - wound care following     #seizure disorder  #Down syndrome  #Nonverbal bedbound  # PEG tube   - c/w home meds    DVT ppx: lovenox  Code status: DNR/DNI, trial of NIV  Diet: NPO w/ peg feeds   Dispo: Floor.

## 2024-04-20 NOTE — DIETITIAN INITIAL EVALUATION ADULT - PERTINENT MEDS FT
CERTIFICATE OF WORK    June 5, 2017      Re: Logan Ferguson  [address]  [facility] [address]      This is to certify that Logan Ferguson has been seen on 6/5/2017 and can return to regular work on 06/06/2017    RESTRICTIONS: none          SIGNATURE:___________________________________________,   6/5/2017      Cachoror Moore DO  [address] [place] [address]  
MEDICATIONS  (STANDING):  albuterol/ipratropium for Nebulization 3 milliLiter(s) Nebulizer every 6 hours  calcium carbonate   1250 mG (OsCal) 1 Tablet(s) Oral daily  enoxaparin Injectable 30 milliGRAM(s) SubCutaneous every 24 hours  famotidine    Tablet 20 milliGRAM(s) Oral two times a day  levETIRAcetam  Solution 750 milliGRAM(s) Oral two times a day  meropenem  IVPB 1000 milliGRAM(s) IV Intermittent every 8 hours  midodrine 15 milliGRAM(s) Oral every 8 hours  raloxifene 60 milliGRAM(s) Oral daily  senna 2 Tablet(s) Oral at bedtime    MEDICATIONS  (PRN):  aluminum hydroxide/magnesium hydroxide/simethicone Suspension 30 milliLiter(s) Oral every 4 hours PRN Dyspepsia  ondansetron Injectable 4 milliGRAM(s) IV Push every 8 hours PRN Nausea and/or Vomiting  
Negative

## 2024-04-20 NOTE — DIETITIAN INITIAL EVALUATION ADULT - NS FNS DIET ORDER
Diet, NPO:   Tube Feeding Modality: Gastro-Jejunostomy  Jevity 1.2 Juventino (JEVITY1.2RTH)  Total Volume for 24 Hours (mL): 1200  Bolus  Total # of Feeds: 4  Tube Feed Frequency: Every 6 hours   Tube Feed Start Time: 08:00   Bolus Feed Duration (in Hours): 0.5  Free Water Flush   Total Volume of Bolus (mL):  300  Free Water Flush Instructions:  100cc water flush with each feed (04-18-24 @ 08:09) [Active]

## 2024-04-20 NOTE — DIETITIAN INITIAL EVALUATION ADULT - PERTINENT LABORATORY DATA
04-20    141  |  101  |  10  ----------------------------<  112<H>  4.7   |  29  |  <0.5<L>    Ca    8.9      20 Apr 2024 07:36    POCT Blood Glucose.: 171 mg/dL (04-19-24 @ 18:46)  A1C with Estimated Average Glucose Result: 5.0 % (04-17-24 @ 06:47)

## 2024-04-20 NOTE — DIETITIAN INITIAL EVALUATION ADULT - OTHER INFO
--58y old Female who presents with a chief complaint of Pneumonia.   #acute hypoxemic respiratory failure   #Severe sepsis with septic shock(WBC 12.25, Tmax 104.4F, 84% RA; hypotensive initially SBP 70s + source)  # metabolic encephalopathy  # B/l feet ulcers  #Nonverbal bedbound  # PEG tube   #R heel unstagable  pressure injury ~3x2 in size;   B/l buttock moisture associated dermatitis with  erythema and denuded skin wound per wound care NP note 4/19

## 2024-04-21 LAB
ALBUMIN SERPL ELPH-MCNC: 2.9 G/DL — LOW (ref 3.5–5.2)
ALP SERPL-CCNC: 104 U/L — SIGNIFICANT CHANGE UP (ref 30–115)
ALT FLD-CCNC: 11 U/L — SIGNIFICANT CHANGE UP (ref 0–41)
ANION GAP SERPL CALC-SCNC: 10 MMOL/L — SIGNIFICANT CHANGE UP (ref 7–14)
AST SERPL-CCNC: 21 U/L — SIGNIFICANT CHANGE UP (ref 0–41)
BASOPHILS # BLD AUTO: 0.07 K/UL — SIGNIFICANT CHANGE UP (ref 0–0.2)
BASOPHILS NFR BLD AUTO: 1.1 % — HIGH (ref 0–1)
BILIRUB SERPL-MCNC: <0.2 MG/DL — SIGNIFICANT CHANGE UP (ref 0.2–1.2)
BUN SERPL-MCNC: 12 MG/DL — SIGNIFICANT CHANGE UP (ref 10–20)
CALCIUM SERPL-MCNC: 8.8 MG/DL — SIGNIFICANT CHANGE UP (ref 8.4–10.5)
CHLORIDE SERPL-SCNC: 98 MMOL/L — SIGNIFICANT CHANGE UP (ref 98–110)
CO2 SERPL-SCNC: 28 MMOL/L — SIGNIFICANT CHANGE UP (ref 17–32)
CREAT SERPL-MCNC: 0.5 MG/DL — LOW (ref 0.7–1.5)
EGFR: 109 ML/MIN/1.73M2 — SIGNIFICANT CHANGE UP
EOSINOPHIL # BLD AUTO: 0.22 K/UL — SIGNIFICANT CHANGE UP (ref 0–0.7)
EOSINOPHIL NFR BLD AUTO: 3.5 % — SIGNIFICANT CHANGE UP (ref 0–8)
GLUCOSE BLDC GLUCOMTR-MCNC: 118 MG/DL — HIGH (ref 70–99)
GLUCOSE BLDC GLUCOMTR-MCNC: 131 MG/DL — HIGH (ref 70–99)
GLUCOSE BLDC GLUCOMTR-MCNC: 141 MG/DL — HIGH (ref 70–99)
GLUCOSE SERPL-MCNC: 109 MG/DL — HIGH (ref 70–99)
HCT VFR BLD CALC: 31.7 % — LOW (ref 37–47)
HGB BLD-MCNC: 9.6 G/DL — LOW (ref 12–16)
IMM GRANULOCYTES NFR BLD AUTO: 1.3 % — HIGH (ref 0.1–0.3)
LYMPHOCYTES # BLD AUTO: 1.63 K/UL — SIGNIFICANT CHANGE UP (ref 1.2–3.4)
LYMPHOCYTES # BLD AUTO: 25.8 % — SIGNIFICANT CHANGE UP (ref 20.5–51.1)
MAGNESIUM SERPL-MCNC: 2.3 MG/DL — SIGNIFICANT CHANGE UP (ref 1.8–2.4)
MCHC RBC-ENTMCNC: 27.8 PG — SIGNIFICANT CHANGE UP (ref 27–31)
MCHC RBC-ENTMCNC: 30.3 G/DL — LOW (ref 32–37)
MCV RBC AUTO: 91.9 FL — SIGNIFICANT CHANGE UP (ref 81–99)
MONOCYTES # BLD AUTO: 0.45 K/UL — SIGNIFICANT CHANGE UP (ref 0.1–0.6)
MONOCYTES NFR BLD AUTO: 7.1 % — SIGNIFICANT CHANGE UP (ref 1.7–9.3)
NEUTROPHILS # BLD AUTO: 3.86 K/UL — SIGNIFICANT CHANGE UP (ref 1.4–6.5)
NEUTROPHILS NFR BLD AUTO: 61.2 % — SIGNIFICANT CHANGE UP (ref 42.2–75.2)
NRBC # BLD: 0 /100 WBCS — SIGNIFICANT CHANGE UP (ref 0–0)
PLATELET # BLD AUTO: 375 K/UL — SIGNIFICANT CHANGE UP (ref 130–400)
PMV BLD: 10.6 FL — HIGH (ref 7.4–10.4)
POTASSIUM SERPL-MCNC: 5 MMOL/L — SIGNIFICANT CHANGE UP (ref 3.5–5)
POTASSIUM SERPL-SCNC: 5 MMOL/L — SIGNIFICANT CHANGE UP (ref 3.5–5)
PROT SERPL-MCNC: 5.9 G/DL — LOW (ref 6–8)
RBC # BLD: 3.45 M/UL — LOW (ref 4.2–5.4)
RBC # FLD: 22.5 % — HIGH (ref 11.5–14.5)
SODIUM SERPL-SCNC: 136 MMOL/L — SIGNIFICANT CHANGE UP (ref 135–146)
WBC # BLD: 6.31 K/UL — SIGNIFICANT CHANGE UP (ref 4.8–10.8)
WBC # FLD AUTO: 6.31 K/UL — SIGNIFICANT CHANGE UP (ref 4.8–10.8)

## 2024-04-21 PROCEDURE — 99232 SBSQ HOSP IP/OBS MODERATE 35: CPT

## 2024-04-21 RX ORDER — SODIUM CHLORIDE 9 MG/ML
500 INJECTION INTRAMUSCULAR; INTRAVENOUS; SUBCUTANEOUS ONCE
Refills: 0 | Status: COMPLETED | OUTPATIENT
Start: 2024-04-21 | End: 2024-04-21

## 2024-04-21 RX ADMIN — ENOXAPARIN SODIUM 30 MILLIGRAM(S): 100 INJECTION SUBCUTANEOUS at 14:43

## 2024-04-21 RX ADMIN — MEROPENEM 100 MILLIGRAM(S): 1 INJECTION INTRAVENOUS at 21:56

## 2024-04-21 RX ADMIN — LEVETIRACETAM 750 MILLIGRAM(S): 250 TABLET, FILM COATED ORAL at 06:02

## 2024-04-21 RX ADMIN — LEVETIRACETAM 750 MILLIGRAM(S): 250 TABLET, FILM COATED ORAL at 17:14

## 2024-04-21 RX ADMIN — FAMOTIDINE 20 MILLIGRAM(S): 10 INJECTION INTRAVENOUS at 06:02

## 2024-04-21 RX ADMIN — SODIUM CHLORIDE 500 MILLILITER(S): 9 INJECTION INTRAMUSCULAR; INTRAVENOUS; SUBCUTANEOUS at 16:50

## 2024-04-21 RX ADMIN — MIDODRINE HYDROCHLORIDE 10 MILLIGRAM(S): 2.5 TABLET ORAL at 21:56

## 2024-04-21 RX ADMIN — Medication 1 APPLICATION(S): at 06:58

## 2024-04-21 RX ADMIN — Medication 1 DROP(S): at 18:16

## 2024-04-21 RX ADMIN — SENNA PLUS 2 TABLET(S): 8.6 TABLET ORAL at 22:13

## 2024-04-21 RX ADMIN — Medication 1 TABLET(S): at 11:46

## 2024-04-21 RX ADMIN — MIDODRINE HYDROCHLORIDE 10 MILLIGRAM(S): 2.5 TABLET ORAL at 15:19

## 2024-04-21 RX ADMIN — Medication 1 APPLICATION(S): at 18:16

## 2024-04-21 RX ADMIN — MIDODRINE HYDROCHLORIDE 10 MILLIGRAM(S): 2.5 TABLET ORAL at 06:02

## 2024-04-21 RX ADMIN — Medication 1 APPLICATION(S): at 11:47

## 2024-04-21 RX ADMIN — RALOXIFENE HYDROCHLORIDE 60 MILLIGRAM(S): 60 TABLET, COATED ORAL at 11:46

## 2024-04-21 RX ADMIN — Medication 3 MILLILITER(S): at 08:04

## 2024-04-21 RX ADMIN — SCOPALAMINE 1 PATCH: 1 PATCH, EXTENDED RELEASE TRANSDERMAL at 06:03

## 2024-04-21 RX ADMIN — SCOPALAMINE 1 PATCH: 1 PATCH, EXTENDED RELEASE TRANSDERMAL at 08:20

## 2024-04-21 RX ADMIN — MEROPENEM 100 MILLIGRAM(S): 1 INJECTION INTRAVENOUS at 14:45

## 2024-04-21 RX ADMIN — Medication 1 DROP(S): at 06:59

## 2024-04-21 RX ADMIN — GABAPENTIN 300 MILLIGRAM(S): 400 CAPSULE ORAL at 18:16

## 2024-04-21 RX ADMIN — Medication 3 MILLILITER(S): at 19:43

## 2024-04-21 RX ADMIN — Medication 1 APPLICATION(S): at 06:59

## 2024-04-21 RX ADMIN — FAMOTIDINE 20 MILLIGRAM(S): 10 INJECTION INTRAVENOUS at 17:14

## 2024-04-21 RX ADMIN — MEROPENEM 100 MILLIGRAM(S): 1 INJECTION INTRAVENOUS at 06:01

## 2024-04-21 RX ADMIN — GABAPENTIN 300 MILLIGRAM(S): 400 CAPSULE ORAL at 07:35

## 2024-04-21 RX ADMIN — Medication 3 MILLILITER(S): at 15:05

## 2024-04-21 RX ADMIN — Medication 1 APPLICATION(S): at 18:17

## 2024-04-21 RX ADMIN — CHLORHEXIDINE GLUCONATE 1 APPLICATION(S): 213 SOLUTION TOPICAL at 11:48

## 2024-04-21 NOTE — PROGRESS NOTE ADULT - ASSESSMENT
57yo F w/ pmhx of Down's syndrome, cerebral palsy, seizure disorder presents from [facility] for respiratory distress. Admitted to SDU for severe sepsis and acute hypoxemic respiratory failure likely secondary to recurrent CAP.     #acute hypoxemic respiratory failure   #Severe sepsis with septic shock(WBC 12.25, Tmax 104.4F, 84% RA; hypotensive initially SBP 70s + source)  #possible recurrent CAP   #chronic hypotension   # metabolic encephalopathy  - CXR: possible basilar opacities  - covid/RSV, MSRA, negative; Bl cx with contaminant   - C/w Midodrine, increase midodrine 15 mg TiD, now off levophed ( since 4/19 9 AM )   - target MAP 60   - blood cx 4/15: staph capitis  - blood cx 4/15 and 4/17: NGTD  - c/w Alicia for total of 7 days  (4/16 - )  - Scopolamine for secretions. aspiration precautions   - fungitell improving   - Palliative care following  - F/u bcx; fungitell (previously positive s/p caspo), Urine for strep pneumonia/legionella antigen    # B/l feet ulcers  - No abscess/cellulitis  - colonized with ESBL strain  -Off loading to prevent pressure sores   - wound care following     #seizure disorder  #Down syndrome  #Nonverbal bedbound  # PEG tube   - c/w home meds    DVT ppx: lovenox  Code status: DNR/DNI, trial of NIV  Diet: NPO w/ peg feeds   Dispo: Floor.   59yo F with PMH of Down's syndrome, cerebral palsy, seizure disorder presents from [facility] for respiratory distress. Admitted to SDU for severe sepsis and acute hypoxemic respiratory failure likely secondary to recurrent CAP.     # Acute hypoxemic respiratory failure   # Severe sepsis with septic shock (WBC 12.25, Tmax 104.4F, 84% RA; hypotensive initially SBP 70s + source)  # Possible recurrent CAP   # Chronic hypotension   # Metabolic encephalopathy  - CXR: possible basilar opacities  - covid/RSV, MSRA, negative; Bl cx with contaminant   - c/w midodrine 10 mg TID, now off levophed ( since 4/19 9 AM ), target MAP 60   - blood cx 4/15: staph capitis  - blood cx 4/15 and 4/17: NGTD  - c/w Alicia for total of 7 days  (4/16 - ) Day 6  - Scopolamine for secretions, aspiration precautions   - fungi-tell improving   - Palliative care following  - F/u bcx; fungitell (previously positive s/p caspo), Urine for strep pneumonia/legionella antigen    # B/l feet ulcers  - no abscess/cellulitis  - colonized with ESBL strain  - off loading to prevent pressure sores   - wound care following     # Seizure disorder  # Down syndrome  # Nonverbal bedbound  # PEG tube   - c/w home meds, on keppra    DVT ppx: lovenox subcut  Code status: DNR/DNI, trial of NIV  Diet: NPO w/ peg feeds   Dispo: Floor, acute from [facility]

## 2024-04-22 LAB
ALBUMIN SERPL ELPH-MCNC: 2.9 G/DL — LOW (ref 3.5–5.2)
ALP SERPL-CCNC: 90 U/L — SIGNIFICANT CHANGE UP (ref 30–115)
ALT FLD-CCNC: 10 U/L — SIGNIFICANT CHANGE UP (ref 0–41)
ANION GAP SERPL CALC-SCNC: 12 MMOL/L — SIGNIFICANT CHANGE UP (ref 7–14)
AST SERPL-CCNC: 13 U/L — SIGNIFICANT CHANGE UP (ref 0–41)
BASOPHILS # BLD AUTO: 0.06 K/UL — SIGNIFICANT CHANGE UP (ref 0–0.2)
BASOPHILS NFR BLD AUTO: 0.4 % — SIGNIFICANT CHANGE UP (ref 0–1)
BILIRUB SERPL-MCNC: <0.2 MG/DL — SIGNIFICANT CHANGE UP (ref 0.2–1.2)
BUN SERPL-MCNC: 13 MG/DL — SIGNIFICANT CHANGE UP (ref 10–20)
CALCIUM SERPL-MCNC: 8.8 MG/DL — SIGNIFICANT CHANGE UP (ref 8.4–10.4)
CHLORIDE SERPL-SCNC: 102 MMOL/L — SIGNIFICANT CHANGE UP (ref 98–110)
CO2 SERPL-SCNC: 26 MMOL/L — SIGNIFICANT CHANGE UP (ref 17–32)
CREAT SERPL-MCNC: 0.5 MG/DL — LOW (ref 0.7–1.5)
CULTURE RESULTS: SIGNIFICANT CHANGE UP
EGFR: 109 ML/MIN/1.73M2 — SIGNIFICANT CHANGE UP
EOSINOPHIL # BLD AUTO: 0.07 K/UL — SIGNIFICANT CHANGE UP (ref 0–0.7)
EOSINOPHIL NFR BLD AUTO: 0.4 % — SIGNIFICANT CHANGE UP (ref 0–8)
GLUCOSE BLDC GLUCOMTR-MCNC: 126 MG/DL — HIGH (ref 70–99)
GLUCOSE BLDC GLUCOMTR-MCNC: 153 MG/DL — HIGH (ref 70–99)
GLUCOSE SERPL-MCNC: 123 MG/DL — HIGH (ref 70–99)
HCT VFR BLD CALC: 29.8 % — LOW (ref 37–47)
HGB BLD-MCNC: 9.1 G/DL — LOW (ref 12–16)
IMM GRANULOCYTES NFR BLD AUTO: 0.7 % — HIGH (ref 0.1–0.3)
LYMPHOCYTES # BLD AUTO: 1.74 K/UL — SIGNIFICANT CHANGE UP (ref 1.2–3.4)
LYMPHOCYTES # BLD AUTO: 10.5 % — LOW (ref 20.5–51.1)
MAGNESIUM SERPL-MCNC: 2.4 MG/DL — SIGNIFICANT CHANGE UP (ref 1.8–2.4)
MCHC RBC-ENTMCNC: 27.7 PG — SIGNIFICANT CHANGE UP (ref 27–31)
MCHC RBC-ENTMCNC: 30.5 G/DL — LOW (ref 32–37)
MCV RBC AUTO: 90.9 FL — SIGNIFICANT CHANGE UP (ref 81–99)
MONOCYTES # BLD AUTO: 0.43 K/UL — SIGNIFICANT CHANGE UP (ref 0.1–0.6)
MONOCYTES NFR BLD AUTO: 2.6 % — SIGNIFICANT CHANGE UP (ref 1.7–9.3)
NEUTROPHILS # BLD AUTO: 14.23 K/UL — HIGH (ref 1.4–6.5)
NEUTROPHILS NFR BLD AUTO: 85.4 % — HIGH (ref 42.2–75.2)
NRBC # BLD: 0 /100 WBCS — SIGNIFICANT CHANGE UP (ref 0–0)
PLATELET # BLD AUTO: 415 K/UL — HIGH (ref 130–400)
PMV BLD: 10 FL — SIGNIFICANT CHANGE UP (ref 7.4–10.4)
POTASSIUM SERPL-MCNC: 4.5 MMOL/L — SIGNIFICANT CHANGE UP (ref 3.5–5)
POTASSIUM SERPL-SCNC: 4.5 MMOL/L — SIGNIFICANT CHANGE UP (ref 3.5–5)
PROT SERPL-MCNC: 6 G/DL — SIGNIFICANT CHANGE UP (ref 6–8)
RBC # BLD: 3.28 M/UL — LOW (ref 4.2–5.4)
RBC # FLD: 23.1 % — HIGH (ref 11.5–14.5)
SODIUM SERPL-SCNC: 140 MMOL/L — SIGNIFICANT CHANGE UP (ref 135–146)
SPECIMEN SOURCE: SIGNIFICANT CHANGE UP
WBC # BLD: 16.64 K/UL — HIGH (ref 4.8–10.8)
WBC # FLD AUTO: 16.64 K/UL — HIGH (ref 4.8–10.8)

## 2024-04-22 PROCEDURE — 99233 SBSQ HOSP IP/OBS HIGH 50: CPT

## 2024-04-22 PROCEDURE — 99232 SBSQ HOSP IP/OBS MODERATE 35: CPT

## 2024-04-22 PROCEDURE — 71045 X-RAY EXAM CHEST 1 VIEW: CPT | Mod: 26

## 2024-04-22 RX ORDER — MIDODRINE HYDROCHLORIDE 2.5 MG/1
5 TABLET ORAL EVERY 8 HOURS
Refills: 0 | Status: DISCONTINUED | OUTPATIENT
Start: 2024-04-22 | End: 2024-05-22

## 2024-04-22 RX ORDER — ACETAMINOPHEN 500 MG
650 TABLET ORAL ONCE
Refills: 0 | Status: DISCONTINUED | OUTPATIENT
Start: 2024-04-22 | End: 2024-04-22

## 2024-04-22 RX ADMIN — CHLORHEXIDINE GLUCONATE 1 APPLICATION(S): 213 SOLUTION TOPICAL at 11:05

## 2024-04-22 RX ADMIN — LEVETIRACETAM 750 MILLIGRAM(S): 250 TABLET, FILM COATED ORAL at 05:26

## 2024-04-22 RX ADMIN — GABAPENTIN 300 MILLIGRAM(S): 400 CAPSULE ORAL at 17:12

## 2024-04-22 RX ADMIN — Medication 3 MILLILITER(S): at 13:57

## 2024-04-22 RX ADMIN — MIDODRINE HYDROCHLORIDE 5 MILLIGRAM(S): 2.5 TABLET ORAL at 21:28

## 2024-04-22 RX ADMIN — Medication 1 DROP(S): at 05:28

## 2024-04-22 RX ADMIN — Medication 650 MILLIGRAM(S): at 22:06

## 2024-04-22 RX ADMIN — Medication 1 APPLICATION(S): at 17:11

## 2024-04-22 RX ADMIN — Medication 1 DROP(S): at 17:11

## 2024-04-22 RX ADMIN — FAMOTIDINE 20 MILLIGRAM(S): 10 INJECTION INTRAVENOUS at 17:12

## 2024-04-22 RX ADMIN — ENOXAPARIN SODIUM 30 MILLIGRAM(S): 100 INJECTION SUBCUTANEOUS at 13:23

## 2024-04-22 RX ADMIN — Medication 3 MILLILITER(S): at 19:52

## 2024-04-22 RX ADMIN — Medication 1 APPLICATION(S): at 05:28

## 2024-04-22 RX ADMIN — Medication 1 APPLICATION(S): at 17:12

## 2024-04-22 RX ADMIN — MEROPENEM 100 MILLIGRAM(S): 1 INJECTION INTRAVENOUS at 05:26

## 2024-04-22 RX ADMIN — SENNA PLUS 2 TABLET(S): 8.6 TABLET ORAL at 21:29

## 2024-04-22 RX ADMIN — Medication 1 APPLICATION(S): at 05:29

## 2024-04-22 RX ADMIN — MIDODRINE HYDROCHLORIDE 10 MILLIGRAM(S): 2.5 TABLET ORAL at 05:27

## 2024-04-22 RX ADMIN — GABAPENTIN 300 MILLIGRAM(S): 400 CAPSULE ORAL at 05:55

## 2024-04-22 RX ADMIN — RALOXIFENE HYDROCHLORIDE 60 MILLIGRAM(S): 60 TABLET, COATED ORAL at 11:05

## 2024-04-22 RX ADMIN — MEROPENEM 100 MILLIGRAM(S): 1 INJECTION INTRAVENOUS at 21:28

## 2024-04-22 RX ADMIN — Medication 1 APPLICATION(S): at 11:05

## 2024-04-22 RX ADMIN — MIDODRINE HYDROCHLORIDE 5 MILLIGRAM(S): 2.5 TABLET ORAL at 13:22

## 2024-04-22 RX ADMIN — LEVETIRACETAM 750 MILLIGRAM(S): 250 TABLET, FILM COATED ORAL at 17:12

## 2024-04-22 RX ADMIN — Medication 1 TABLET(S): at 11:06

## 2024-04-22 RX ADMIN — MEROPENEM 100 MILLIGRAM(S): 1 INJECTION INTRAVENOUS at 13:22

## 2024-04-22 RX ADMIN — Medication 3 MILLILITER(S): at 07:30

## 2024-04-22 RX ADMIN — FAMOTIDINE 20 MILLIGRAM(S): 10 INJECTION INTRAVENOUS at 05:27

## 2024-04-22 NOTE — PROGRESS NOTE ADULT - PROBLEM SELECTOR PLAN 4
- will follow  ______________  Wu Jenkins MD  Palliative Medicine  [facility]   of Geriatric and Palliative Medicine  [phone]
- will sign off  ______________  Wu Jenkins MD  Palliative Medicine  [facility]   of Geriatric and Palliative Medicine  [phone]

## 2024-04-22 NOTE — PROGRESS NOTE ADULT - SUBJECTIVE AND OBJECTIVE BOX
24H events:    Patient is a 58y old Female who presents with a chief complaint of Pneumonia (21 Apr 2024 08:53)    Primary diagnosis of Acute sepsis  Today is hospital day 7d. This morning patient was seen and examined at bedside, resting comfortably in bed.    No acute or major events overnight.    Code Status: DNI/DNR; Trial of NIV       PAST MEDICAL & SURGICAL HISTORY  Down syndrome    Osteoporosis    Mild anemia    Neuropathy    S/P debridement  of R hip on 3/2/21      SOCIAL HISTORY:  Social History:      ALLERGIES:  No Known Allergies    MEDICATIONS:  STANDING MEDICATIONS  albuterol/ipratropium for Nebulization 3 milliLiter(s) Nebulizer every 6 hours  AQUAPHOR (petrolatum Ointment) 1 Application(s) Topical two times a day  artificial  tears Solution 1 Drop(s) Both EYES two times a day  calcium carbonate   1250 mG (OsCal) 1 Tablet(s) Oral daily  chlorhexidine 2% Cloths 1 Application(s) Topical daily  enoxaparin Injectable 30 milliGRAM(s) SubCutaneous every 24 hours  famotidine    Tablet 20 milliGRAM(s) Oral two times a day  gabapentin Solution 300 milliGRAM(s) Oral two times a day  hydrocortisone 1% Cream 1 Application(s) Topical daily  lactated ringers Bolus 500 milliLiter(s) IV Bolus once  levETIRAcetam  Solution 750 milliGRAM(s) Oral two times a day  meropenem  IVPB 1000 milliGRAM(s) IV Intermittent every 8 hours  midodrine 10 milliGRAM(s) Oral every 8 hours  raloxifene 60 milliGRAM(s) Oral daily  scopolamine 1 mG/72 Hr(s) Patch 1 Patch Transdermal every 72 hours  senna 2 Tablet(s) Oral at bedtime  silver sulfADIAZINE 1% Cream 1 Application(s) Topical two times a day    PRN MEDICATIONS  acetaminophen     Tablet .. 650 milliGRAM(s) Oral every 6 hours PRN  aluminum hydroxide/magnesium hydroxide/simethicone Suspension 30 milliLiter(s) Oral every 4 hours PRN  melatonin 3 milliGRAM(s) Oral at bedtime PRN  ondansetron Injectable 4 milliGRAM(s) IV Push every 8 hours PRN    VITALS:   T(F): 99.1  HR: 82  BP: 100/50  RR: 19  SpO2: 98%    PHYSICAL EXAM:  GENERAL:   (x) NAD, lying in bed comfortably     (  ) obtunded     (  ) lethargic     (  ) somnolent    HEAD:   ( x ) Atraumatic     (  ) hematoma     (  ) laceration (specify location:       )     NECK:  ( x ) Supple     (  ) neck stiffness     (  ) nuchal rigidity     (  )  no JVD     (  ) JVD present ( -- cm)    HEART:  Rate -->     x(  ) normal rate     (  ) bradycardic     (  ) tachycardic  Rhythm -->     ( x ) regular     (  ) regularly irregular     (  ) irregularly irregular  Murmurs -->     ( x ) normal s1s2     (  ) systolic murmur     (  ) diastolic murmur     (  ) continuous murmur      (  ) S3 present     (  ) S4 present    LUNGS:   (  )Unlabored respirations     (  ) tachypnea  (  ) B/L air entry     (  ) decreased breath sounds in:  (location     )    (  ) no adventitious sound     (  ) crackles     (  ) wheezing      (  ) rhonchi      (specify location:       )  (  ) chest wall tenderness (specify location:       )    ABDOMEN: PEG tube   ( x ) Soft     (  ) tense   |   ( x ) nondistended     (  ) distended   |   ( x ) +BS     (  ) hypoactive bowel sounds     (  ) hyperactive bowel sounds  (  ) nontender     (  ) RUQ tenderness     (  ) RLQ tenderness     (  ) LLQ tenderness     (  ) epigastric tenderness     (  ) diffuse tenderness  (  ) Splenomegaly      (  ) Hepatomegaly      (  ) Jaundice     (  ) ecchymosis     EXTREMITIES: Ulcers present; Contracted extremities     NERVOUS SYSTEM:  Difficult assessment ; has Downs Syndrome     LABS:                        9.6    6.31  )-----------( 375      ( 21 Apr 2024 06:07 )             31.7     04-21    136  |  98  |  12  ----------------------------<  109<H>  5.0   |  28  |  0.5<L>    Ca    8.8      21 Apr 2024 06:07  Mg     2.3     04-21    TPro  5.9<L>  /  Alb  2.9<L>  /  TBili  <0.2  /  DBili  x   /  AST  21  /  ALT  11  /  AlkPhos  104  04-21      Urinalysis Basic - ( 21 Apr 2024 06:07 )    Color: x / Appearance: x / SG: x / pH: x  Gluc: 109 mg/dL / Ketone: x  / Bili: x / Urobili: x   Blood: x / Protein: x / Nitrite: x   Leuk Esterase: x / RBC: x / WBC x   Sq Epi: x / Non Sq Epi: x / Bacteria: x                RADIOLOGY:    < from: Xray Kidney Ureter Bladder (04.18.24 @ 08:02) >  Findings/  impression:    Paucity of small bowel gas without evidence for obstruction. Feeding tube   overlies stomach.    Osseous structures are unchanged.    < end of copied text >  < from: [facility] Duplex Lower Ext Vein Scan, Bilat (04.16.24 @ 17:24) >    FINDINGS:    RIGHT:  The right thigh veins were not visualized due to the patient's   positioning. No RIGHT calf vein thrombosis is detected.    LEFT:  Normal compressibility of the LEFT common femoral, femoral and popliteal   veins.  Doppler examination shows normal spontaneous and phasic flow.  No LEFT calf vein thrombosis is detected.    IMPRESSION:  No evidence of deep venous thrombosis in either lower extremity.    < end of copied text >  < from: Xray Chest 1 View- PORTABLE-Urgent (04.15.24 @ 18:15) >  INTERPRETATION:  Clinical History / Reason for exam: Sepsis    Comparison : Chest radiograph 4/4/2024.    Technique/Positioning: Frontal radiograph of the chest.    Findings:    Support devices: None.    Cardiac/mediastinum/hilum: Unremarkable.    Lung parenchyma/Pleura: Stable parenchymal pattern. No pneumothorax or   pleural effusion.    Skeleton/soft tissues: Unremarkable.    Impression:  No interval change, stable parenchymal pattern.    --- End of Report ---    < end of copied text >

## 2024-04-22 NOTE — PROGRESS NOTE ADULT - ASSESSMENT
58F with PMH including Down's syndrome, cerebral palsy, and seizure disorder, here from [facility] with respiratory distress and acute respiratory failure, found to have septic shock from CAP on IV abx and pressors. Palliative consulted for GOC. DNR/DNI.

## 2024-04-22 NOTE — PROGRESS NOTE ADULT - PROBLEM SELECTOR PLAN 3
- see 4/18 note
- see 4/18 note  - currently would not consider further withdrawal of care as patient is improving

## 2024-04-22 NOTE — PROGRESS NOTE ADULT - SUBJECTIVE AND OBJECTIVE BOX
HPI: 58F with PMH including Down's syndrome, cerebral palsy, and seizure disorder, here from [facility] with respiratory distress and acute respiratory failure, found to have septic shock from CAP on IV abx and pressors. Palliative consulted for C. DNR/DNI.     INTERVAL EVENTS  4/19: patient appears comfortable  4/22: patient comfortable, resting    ADVANCE DIRECTIVES:    [ ] Full Code [X ] DNR  MOLST  [ ]  Living Will  [ ]   DECISION MAKER(s):  [ ] Health Care Proxy(s)  [ ] Surrogate(s)  [ ] Guardian           Name(s): Phone Number(s): [email or web address] RIVAS    BASELINE (I)ADL(s) (prior to admission):  [place]: [ ]Total  [ ] Moderate [ ]Dependent  Palliative Performance Status Version 2:         %    http://npcrc.org/files/news/palliative_performance_scale_ppsv2.pdf    Allergies    No Known Allergies    Intolerances    MEDICATIONS  (STANDING):  albuterol/ipratropium for Nebulization 3 milliLiter(s) Nebulizer every 6 hours  AQUAPHOR (petrolatum Ointment) 1 Application(s) Topical two times a day  artificial  tears Solution 1 Drop(s) Both EYES two times a day  calcium carbonate   1250 mG (OsCal) 1 Tablet(s) Oral daily  chlorhexidine 2% Cloths 1 Application(s) Topical daily  enoxaparin Injectable 30 milliGRAM(s) SubCutaneous every 24 hours  famotidine    Tablet 20 milliGRAM(s) Oral two times a day  gabapentin Solution 300 milliGRAM(s) Oral two times a day  hydrocortisone 1% Cream 1 Application(s) Topical daily  lactated ringers Bolus 500 milliLiter(s) IV Bolus once  levETIRAcetam  Solution 750 milliGRAM(s) Oral two times a day  meropenem  IVPB 1000 milliGRAM(s) IV Intermittent every 8 hours  midodrine 5 milliGRAM(s) Oral every 8 hours  raloxifene 60 milliGRAM(s) Oral daily  scopolamine 1 mG/72 Hr(s) Patch 1 Patch Transdermal every 72 hours  senna 2 Tablet(s) Oral at bedtime  silver sulfADIAZINE 1% Cream 1 Application(s) Topical two times a day    MEDICATIONS  (PRN):  acetaminophen     Tablet .. 650 milliGRAM(s) Oral every 6 hours PRN Temp greater or equal to 38C (100.4F), Mild Pain (1 - 3)  aluminum hydroxide/magnesium hydroxide/simethicone Suspension 30 milliLiter(s) Oral every 4 hours PRN Dyspepsia  melatonin 3 milliGRAM(s) Oral at bedtime PRN Insomnia  ondansetron Injectable 4 milliGRAM(s) IV Push every 8 hours PRN Nausea and/or Vomiting    PRESENT SYMPTOMS: [X ]Unable to obtain due to poor mentation   Source if other than patient:  [ ]Family   [ ]Team   [ ]All review of systems negative including pain and dyspnea unless noted below    All components of pain assessment below addressed. Blank spaces indicate that the patient did/could not complete the assessment.  Pain: [ ]yes [ ]no  QOL impact -   Location -                    Aggravating factors -  Quality -  Radiation -  Timing-  Severity (0-10 scale):  Minimal acceptable level (0-10 scale):     CPOT:    https://www.[facility].org/getattachment/[email or web address]-[email or web address]/Critical-Care-Pain-Observation-Tool-(CPOT)    PAIN AD Score: 0  [email or web address]/cog/painad.pdf (press ctrl +  left click to view)    Dyspnea:                           [ ]None[ ]Mild [ ]Moderate [ ]Severe     Respiratory Distress Observation Scale (RDOS): 1  A score of 0 to 2 signifies little or no respiratory distress, 3 signifies mild distress, scores 4 to 6 indicate moderate distress, and scores greater than 7 signify severe distress  https://[email or web address].ca/sites/default/files/[email or web address]-yjyacxfesbu-yswyfdfx-ccclptusdoi-hplzl.pdf    Anxiety:                             [ ]None[ ]Mild [ ]Moderate [ ]Severe   Fatigue:                             [ ]None[ ]Mild [ ]Moderate [ ]Severe   Nausea:                             [ ]None[ ]Mild [ ]Moderate [ ]Severe   Loss of appetite:              [ ]None[ ]Mild [ ]Moderate [ ]Severe   Constipation:                    [ ]None[ ]Mild [ ]Moderate [ ]Severe    Other Symptoms:      Palliative Performance Status Version 2:         %    http://[email or web address].org/files/news/palliative_performance_scale_[email or web address].pdf    PHYSICAL EXAM:  Vital Signs Last 24 Hrs  T(C): 36.7 (22 Apr 2024 14:19), Max: 37.3 (22 Apr 2024 05:04)  T(F): 98 (22 Apr 2024 14:19), Max: 99.1 (22 Apr 2024 05:04)  HR: 81 (22 Apr 2024 14:19) (81 - 109)  BP: 81/55 (22 Apr 2024 14:19) (80/40 - 113/94)  BP(mean): 100 (21 Apr 2024 18:02) (54 - 100)  RR: 19 (22 Apr 2024 05:04) (18 - 19)  SpO2: 96% (22 Apr 2024 14:19) (96% - 98%)    Parameters below as of 22 Apr 2024 14:19  Patient On (Oxygen Delivery Method): mask, Venturi    GENERAL:  [X ] No acute distress [ ]Lethargic  [ ]Unarousable  [ ]Verbal  [ ]Non-Verbal [ ]Cachexia    BEHAVIORAL/PSYCH:  [ ]Alert and Oriented x  [ ] Anxiety [ ] Delirium [ ] Agitation [X ] Calm   EYES: [ X] No scleral icterus [ ] Scleral icterus [ ] Closed  ENMT:  [ ]Dry mouth  [ ]No external oral lesions [ X] No external ear or nose lesions  CARDIOVASCULAR:  [ ]Regular [ ]Irregular [ ]Tachy [ ]Not Tachy  [ ]Raheem [ ] Edema [ ] No edema  PULMONARY:  [ ]Tachypnea  [ ]Audible excessive secretions [X ] No labored breathing [ ] labored breathing  GASTROINTESTINAL: [ ]Soft  [ ]Distended  [ X]Not distended [ ]Non tender [ ]Tender  MUSCULOSKELETAL: [ ]No clubbing [ ] clubbing  [ X] No cyanosis [ ] cyanosis  NEUROLOGIC: [ ]No focal deficits  [ ]Follows commands  [ ]Does not follow commands  [X ]Cognitive impairment  [ ]Dysphagia  [ ]Dysarthria  [ ]Paresis   SKIN: [ ] Jaundiced [X ] Non-jaundiced [ ]Rash [ ]No Rash [ ] Warm [ ] Dry  MISC/LINES: [ ] ET tube [ ] Trach [ ]NGT/OGT [ ]PEG [ ]Madsen    CRITICAL CARE:  [ ] Shock Present  [ ]Septic [ ]Cardiogenic [ ]Neurologic [ ]Hypovolemic  [ ]  Vasopressors [ ]  Inotropes   [ ]Respiratory failure present [ ]Mechanical ventilation [ ]Non-invasive ventilatory support [ ]High flow  [ ]Acute  [ ]Chronic [ ]Hypoxic  [ ]Hypercarbic [ ]Other  [ ]Other organ failure     LABS: reviewed by me, WNL                          9.1    16.64 )-----------( 415      ( 22 Apr 2024 08:30 )             29.8     04-22    140  |  102  |  13  ----------------------------<  123<H>  4.5   |  26  |  0.5<L>    Ca    8.8      22 Apr 2024 08:30  Mg     2.4     04-22        RADIOLOGY & ADDITIONAL STUDIES: reviewed by me  CXR appears clear per my read    PROTEIN CALORIE MALNUTRITION PRESENT: [ ]mild [ ]moderate [ ]severe [ ]underweight [ ]morbid obesity  https://www.andeal.org/vault/2440/web/files/ONC/Table_Clinical%20Characteristics%20to%20Document%20Malnutrition-White%20JV%20et%20al%202012.pdf    Height (cm): 134 (04-15-24 @ 16:54), 134 (04-11-24 @ 13:16), 134 (04-02-24 @ 12:18)  Weight (kg): 42.728 (04-15-24 @ 17:00), 52 (04-02-24 @ 12:18), 60 (11-17-23 @ 15:00)  BMI (kg/m2): 23.8 (04-15-24 @ 17:00), 29 (04-15-24 @ 16:54), 29 (04-11-24 @ 13:16)    [ ]PPSV2 < or = to 30% [ ]significant weight loss  [ ]poor nutritional intake  [ ]anasarca      [ ]Artificial Nutrition          Palliative Care Spiritual/Emotional Screening Tool Question  Severity (0-4):                    OR                    [X ] Unable to determine/NA  Score of 2 or greater indicates recommendation of Chaplaincy referral  Chaplaincy Referral: [ ] Yes [ ] Refused [ ] Following     Caregiver [place]:  [ ] Yes [ ] No    OR    [x ] Unable to determine. Will assess at later time if appropriate.  Social Work Referral [ ]  Patient and Family Centered Care Referral [ ]    Anticipatory Grief Present: [ ] Yes [ ] No    OR     [ x] Unable to determine. Will assess at later time if appropriate.  Social Work Referral [ ]  Patient and Family Centered Care Referral [ ]    REFERRALS:   [ ]Chaplaincy  [ ]Hospice  [ ]Child Life  [ ]Social Work  [ ]Case management [ ]Holistic Therapy     Palliative care education provided to patient and/or family    Goals of Care Document:     ______________  Wu Jenkins MD  Palliative Medicine  [facility]   of Geriatric and Palliative Medicine  [phone]

## 2024-04-22 NOTE — PROGRESS NOTE ADULT - ASSESSMENT
59yo F w/ pmhx of Down's syndrome, cerebral palsy, seizure disorder presents from [facility] for respiratory distress. Admitted to SDU for severe sepsis and acute hypoxemic respiratory failure likely secondary to recurrent CAP.     #acute hypoxemic respiratory failure   #Severe sepsis with septic shock(WBC 12.25, Tmax 104.4F, 84% RA; hypotensive initially SBP 70s + source)  #possible recurrent CAP   #chronic hypotension   # metabolic encephalopathy  - CXR: possible basilar opacities  - covid/RSV, MSRA, negative; Bl cx with contaminant   - C/w Midodrine, increase midodrine 15 mg TiD, now off levophed ( since 4/19 9 AM )   - target MAP 60 ( Patient always has Bp on the softer side )   - blood cx 4/15: staph capitis  - blood cx 4/15 and 4/17: NGTD  - c/w Alicia for total of 7 days  (4/16 - )>> can dc Abx after today's dose.   - Scopolamine for secretions. aspiration precautions   - fungitell improving   - Palliative care following  - F/u bcx; Blood culture Negative since 15th.   - fungitell (previously positive s/p caspo), Urine for strep pneumonia/legionella antigen\    #Episodes of Vomiting:   - On PEG feed   - Aspiration risk   - ????     # B/l feet ulcers  - No abscess/cellulitis  - colonized with ESBL strain  -Off loading to prevent pressure sores   - wound care following     #seizure disorder  #Down syndrome  #Nonverbal bedbound  # PEG tube   - c/w home meds    DVT ppx: lovenox  Code status: DNR/DNI, trial of NIV  Diet: NPO w/ peg feeds    59yo F w/ pmhx of Down's syndrome, cerebral palsy, seizure disorder presents from [facility] for respiratory distress. Admitted to SDU for severe sepsis and acute hypoxemic respiratory failure likely secondary to recurrent CAP.     #acute hypoxemic respiratory failure   #Severe sepsis with septic shock(WBC 12.25, Tmax 104.4F, 84% RA; hypotensive initially SBP 70s + source)  #possible recurrent CAP   #chronic hypotension   # metabolic encephalopathy  - CXR: possible basilar opacities  - covid/RSV, MSRA, negative; Bl cx with contaminant   - C/w Midodrine, increase midodrine 15 mg TiD, now off levophed ( since 4/19 9 AM )   - target MAP 60 ( Patient always has Bp on the softer side )   - blood cx 4/15: staph capitis  - blood cx 4/15 and 4/17: NGTD  - c/w Alicia for total of 7 days  (4/16 - )>> can dc Abx after today's dose.   - Scopolamine for secretions. aspiration precautions   - fungitell improving   - Palliative care following  - F/u bcx; Blood culture Negative since 15th.       #Episodes of Vomiting:   - On PEG feed   - Aspiration risk ; C Xray done   - Can restart the PEG feeding for now     # B/l feet ulcers  - No abscess/cellulitis  - colonized with ESBL strain  -Off loading to prevent pressure sores   - wound care following     #seizure disorder  #Down syndrome  #Nonverbal bedbound  # PEG tube   - c/w home meds    DVT ppx: lovenox  Code status: DNR/DNI, trial of NIV  Diet: NPO w/ peg feeds

## 2024-04-23 LAB
ANION GAP SERPL CALC-SCNC: 7 MMOL/L — SIGNIFICANT CHANGE UP (ref 7–14)
BASOPHILS # BLD AUTO: 0.06 K/UL — SIGNIFICANT CHANGE UP (ref 0–0.2)
BASOPHILS NFR BLD AUTO: 0.3 % — SIGNIFICANT CHANGE UP (ref 0–1)
BUN SERPL-MCNC: 15 MG/DL — SIGNIFICANT CHANGE UP (ref 10–20)
CALCIUM SERPL-MCNC: 8.8 MG/DL — SIGNIFICANT CHANGE UP (ref 8.4–10.5)
CHLORIDE SERPL-SCNC: 99 MMOL/L — SIGNIFICANT CHANGE UP (ref 98–110)
CO2 SERPL-SCNC: 30 MMOL/L — SIGNIFICANT CHANGE UP (ref 17–32)
CREAT SERPL-MCNC: <0.5 MG/DL — LOW (ref 0.7–1.5)
EGFR: 115 ML/MIN/1.73M2 — SIGNIFICANT CHANGE UP
EOSINOPHIL # BLD AUTO: 0.19 K/UL — SIGNIFICANT CHANGE UP (ref 0–0.7)
EOSINOPHIL NFR BLD AUTO: 0.9 % — SIGNIFICANT CHANGE UP (ref 0–8)
GLUCOSE BLDC GLUCOMTR-MCNC: 125 MG/DL — HIGH (ref 70–99)
GLUCOSE SERPL-MCNC: 113 MG/DL — HIGH (ref 70–99)
HCT VFR BLD CALC: 31.1 % — LOW (ref 37–47)
HGB BLD-MCNC: 9.6 G/DL — LOW (ref 12–16)
IMM GRANULOCYTES NFR BLD AUTO: 0.8 % — HIGH (ref 0.1–0.3)
LACTATE SERPL-SCNC: 1.3 MMOL/L — SIGNIFICANT CHANGE UP (ref 0.7–2)
LACTATE SERPL-SCNC: 1.9 MMOL/L — SIGNIFICANT CHANGE UP (ref 0.7–2)
LYMPHOCYTES # BLD AUTO: 1.58 K/UL — SIGNIFICANT CHANGE UP (ref 1.2–3.4)
LYMPHOCYTES # BLD AUTO: 7.4 % — LOW (ref 20.5–51.1)
MAGNESIUM SERPL-MCNC: 2.4 MG/DL — SIGNIFICANT CHANGE UP (ref 1.8–2.4)
MCHC RBC-ENTMCNC: 28.3 PG — SIGNIFICANT CHANGE UP (ref 27–31)
MCHC RBC-ENTMCNC: 30.9 G/DL — LOW (ref 32–37)
MCV RBC AUTO: 91.7 FL — SIGNIFICANT CHANGE UP (ref 81–99)
MONOCYTES # BLD AUTO: 0.42 K/UL — SIGNIFICANT CHANGE UP (ref 0.1–0.6)
MONOCYTES NFR BLD AUTO: 2 % — SIGNIFICANT CHANGE UP (ref 1.7–9.3)
NEUTROPHILS # BLD AUTO: 18.81 K/UL — HIGH (ref 1.4–6.5)
NEUTROPHILS NFR BLD AUTO: 88.6 % — HIGH (ref 42.2–75.2)
NRBC # BLD: 0 /100 WBCS — SIGNIFICANT CHANGE UP (ref 0–0)
PLATELET # BLD AUTO: 480 K/UL — HIGH (ref 130–400)
PMV BLD: 10.3 FL — SIGNIFICANT CHANGE UP (ref 7.4–10.4)
POTASSIUM SERPL-MCNC: 4.5 MMOL/L — SIGNIFICANT CHANGE UP (ref 3.5–5)
POTASSIUM SERPL-SCNC: 4.5 MMOL/L — SIGNIFICANT CHANGE UP (ref 3.5–5)
RBC # BLD: 3.39 M/UL — LOW (ref 4.2–5.4)
RBC # FLD: 23.4 % — HIGH (ref 11.5–14.5)
SODIUM SERPL-SCNC: 136 MMOL/L — SIGNIFICANT CHANGE UP (ref 135–146)
WBC # BLD: 21.23 K/UL — HIGH (ref 4.8–10.8)
WBC # FLD AUTO: 21.23 K/UL — HIGH (ref 4.8–10.8)

## 2024-04-23 PROCEDURE — 99232 SBSQ HOSP IP/OBS MODERATE 35: CPT

## 2024-04-23 RX ORDER — SODIUM CHLORIDE 9 MG/ML
1000 INJECTION, SOLUTION INTRAVENOUS ONCE
Refills: 0 | Status: COMPLETED | OUTPATIENT
Start: 2024-04-23 | End: 2024-04-23

## 2024-04-23 RX ORDER — VANCOMYCIN HCL 1 G
1000 VIAL (EA) INTRAVENOUS ONCE
Refills: 0 | Status: DISCONTINUED | OUTPATIENT
Start: 2024-04-23 | End: 2024-04-23

## 2024-04-23 RX ORDER — MEROPENEM 1 G/30ML
1000 INJECTION INTRAVENOUS EVERY 8 HOURS
Refills: 0 | Status: COMPLETED | OUTPATIENT
Start: 2024-04-23 | End: 2024-04-30

## 2024-04-23 RX ADMIN — LEVETIRACETAM 750 MILLIGRAM(S): 250 TABLET, FILM COATED ORAL at 05:30

## 2024-04-23 RX ADMIN — Medication 1 APPLICATION(S): at 05:29

## 2024-04-23 RX ADMIN — FAMOTIDINE 20 MILLIGRAM(S): 10 INJECTION INTRAVENOUS at 05:31

## 2024-04-23 RX ADMIN — CHLORHEXIDINE GLUCONATE 1 APPLICATION(S): 213 SOLUTION TOPICAL at 15:25

## 2024-04-23 RX ADMIN — Medication 3 MILLILITER(S): at 19:58

## 2024-04-23 RX ADMIN — RALOXIFENE HYDROCHLORIDE 60 MILLIGRAM(S): 60 TABLET, COATED ORAL at 11:55

## 2024-04-23 RX ADMIN — Medication 3 MILLILITER(S): at 13:29

## 2024-04-23 RX ADMIN — MIDODRINE HYDROCHLORIDE 5 MILLIGRAM(S): 2.5 TABLET ORAL at 05:30

## 2024-04-23 RX ADMIN — Medication 1 DROP(S): at 05:29

## 2024-04-23 RX ADMIN — ENOXAPARIN SODIUM 30 MILLIGRAM(S): 100 INJECTION SUBCUTANEOUS at 15:36

## 2024-04-23 RX ADMIN — MIDODRINE HYDROCHLORIDE 5 MILLIGRAM(S): 2.5 TABLET ORAL at 13:30

## 2024-04-23 RX ADMIN — Medication 650 MILLIGRAM(S): at 13:30

## 2024-04-23 RX ADMIN — LEVETIRACETAM 750 MILLIGRAM(S): 250 TABLET, FILM COATED ORAL at 18:30

## 2024-04-23 RX ADMIN — Medication 3 MILLILITER(S): at 07:20

## 2024-04-23 RX ADMIN — GABAPENTIN 300 MILLIGRAM(S): 400 CAPSULE ORAL at 18:34

## 2024-04-23 RX ADMIN — SENNA PLUS 2 TABLET(S): 8.6 TABLET ORAL at 21:59

## 2024-04-23 RX ADMIN — Medication 1 APPLICATION(S): at 18:31

## 2024-04-23 RX ADMIN — Medication 1 APPLICATION(S): at 15:25

## 2024-04-23 RX ADMIN — Medication 1 DROP(S): at 18:31

## 2024-04-23 RX ADMIN — Medication 1 TABLET(S): at 12:02

## 2024-04-23 RX ADMIN — MIDODRINE HYDROCHLORIDE 5 MILLIGRAM(S): 2.5 TABLET ORAL at 21:58

## 2024-04-23 RX ADMIN — SCOPALAMINE 1 PATCH: 1 PATCH, EXTENDED RELEASE TRANSDERMAL at 21:11

## 2024-04-23 RX ADMIN — Medication 650 MILLIGRAM(S): at 20:26

## 2024-04-23 RX ADMIN — MEROPENEM 100 MILLIGRAM(S): 1 INJECTION INTRAVENOUS at 13:31

## 2024-04-23 RX ADMIN — SODIUM CHLORIDE 1000 MILLILITER(S): 9 INJECTION, SOLUTION INTRAVENOUS at 14:47

## 2024-04-23 RX ADMIN — MEROPENEM 100 MILLIGRAM(S): 1 INJECTION INTRAVENOUS at 21:58

## 2024-04-23 RX ADMIN — GABAPENTIN 300 MILLIGRAM(S): 400 CAPSULE ORAL at 05:30

## 2024-04-23 RX ADMIN — FAMOTIDINE 20 MILLIGRAM(S): 10 INJECTION INTRAVENOUS at 18:30

## 2024-04-23 NOTE — PROGRESS NOTE ADULT - ASSESSMENT
· Assessment	  58 MARÍA w a PMH of Trisomy 21, a recent admission [id number] for RSV PNA w MICU stay (never intubated), nonverbal at baseline, hypotension, cerebral palsey w seizure disorder, ESBL isolated from ulcer of rt foot 11[facility]23 [facility]. Patient SOA w , RR 22 and febrile to 102.8 rectally. Patient also hypotensive to 82/51. Labs notable for leukocytosis of 12.25 w 3.6% bands, large platelets and a compensated Respiratory acidosis. Imaging notable for a Rt Mid lobe ground glass opacification. Patient MRSA Negative w RVP negative for COVID, RSV and Flu.     IMPRESSION/RECOMMENDATIONS  Ongoing sepsis ( T 102, /m )   RUL suspected GNR PNA  WBC 16.1  4/17,19,21 BCx NG  Immunosuppression could result in poor clinical outcome.   Nares ORSA NG  RVP NG  4/15 Bcx 1/2 CoNS > not a true pathogen  Was colonized with ESBL strain in feet wounds  Nares ORSA NG  COVID 19/ Influenza/ RSV NG.   WBC 16.6  Feet with pressure related ulcers. No abscess/cellulitis    -consider CT chest/A/P ( as pt septic on Meropenem )  -serum fungitell assay  -repeat Nares ORSA  -Sputum gm stain, cultures  -Off loading to prevent pressure sores and preventive measures to avoid aspiration   -Meropenem 1 gm iv q8h

## 2024-04-23 NOTE — PROGRESS NOTE ADULT - SUBJECTIVE AND OBJECTIVE BOX
JERICHOTEA  58y, Female    All available historical data reviewed    OVERNIGHT EVENTS:  fevers  more alert, non verbal, does not follow commands  NRB  NAD at rest    ROS:  unable to obtain history secondary to patient's mental status     VITALS:  T(F): 101.7, Max: 102 (04-22-24 @ 21:53)  HR: 124  BP: 90/53  RR: 19Vital Signs Last 24 Hrs  T(C): 38.7 (23 Apr 2024 14:16), Max: 38.9 (22 Apr 2024 21:53)  T(F): 101.7 (23 Apr 2024 14:16), Max: 102 (22 Apr 2024 21:53)  HR: 124 (23 Apr 2024 14:16) (108 - 124)  BP: 90/53 (23 Apr 2024 14:16) (90/53 - 98/52)  BP(mean): --  RR: 19 (23 Apr 2024 05:03) (19 - 19)  SpO2: 93% (23 Apr 2024 14:16) (93% - 98%)    Parameters below as of 23 Apr 2024 14:16  Patient On (Oxygen Delivery Method): mask, Venturi        TESTS & MEASUREMENTS:                        9.6    21.23 )-----------( 480      ( 23 Apr 2024 11:26 )             31.1     04-23    136  |  99  |  15  ----------------------------<  113<H>  4.5   |  30  |  <0.5<L>    Ca    8.8      23 Apr 2024 11:26  Mg     2.4     04-23    TPro  6.0  /  Alb  2.9<L>  /  TBili  <0.2  /  DBili  x   /  AST  13  /  ALT  10  /  AlkPhos  90  04-22    LIVER FUNCTIONS - ( 22 Apr 2024 08:30 )  Alb: 2.9 g/dL / Pro: 6.0 g/dL / ALK PHOS: 90 U/L / ALT: 10 U/L / AST: 13 U/L / GGT: x             Culture - Blood (collected 04-21-24 @ 06:07)  Source: .Blood None  Preliminary Report (04-22-24 @ 16:01):    No growth at 24 hours    Culture - Blood (collected 04-19-24 @ 04:27)  Source: .Blood Blood  Preliminary Report (04-23-24 @ 10:01):    No growth at 4 days    Culture - Blood (collected 04-17-24 @ 11:00)  Source: .Blood None  Final Report (04-22-24 @ 21:00):    No growth at 5 days      Urinalysis Basic - ( 23 Apr 2024 11:26 )    Color: x / Appearance: x / SG: x / pH: x  Gluc: 113 mg/dL / Ketone: x  / Bili: x / Urobili: x   Blood: x / Protein: x / Nitrite: x   Leuk Esterase: x / RBC: x / WBC x   Sq Epi: x / Non Sq Epi: x / Bacteria: x          RADIOLOGY & ADDITIONAL TESTS:  Personal review of radiological diagnostics performed  Echo and EKG results noted when applicable.     MEDICATIONS:  acetaminophen     Tablet .. 650 milliGRAM(s) Oral every 6 hours PRN  albuterol/ipratropium for Nebulization 3 milliLiter(s) Nebulizer every 6 hours  aluminum hydroxide/magnesium hydroxide/simethicone Suspension 30 milliLiter(s) Oral every 4 hours PRN  AQUAPHOR (petrolatum Ointment) 1 Application(s) Topical two times a day  artificial  tears Solution 1 Drop(s) Both EYES two times a day  calcium carbonate   1250 mG (OsCal) 1 Tablet(s) Oral daily  chlorhexidine 2% Cloths 1 Application(s) Topical daily  enoxaparin Injectable 30 milliGRAM(s) SubCutaneous every 24 hours  famotidine    Tablet 20 milliGRAM(s) Oral two times a day  gabapentin Solution 300 milliGRAM(s) Oral two times a day  hydrocortisone 1% Cream 1 Application(s) Topical daily  lactated ringers Bolus 500 milliLiter(s) IV Bolus once  levETIRAcetam  Solution 750 milliGRAM(s) Oral two times a day  melatonin 3 milliGRAM(s) Oral at bedtime PRN  meropenem  IVPB 1000 milliGRAM(s) IV Intermittent every 8 hours  midodrine 5 milliGRAM(s) Oral every 8 hours  ondansetron Injectable 4 milliGRAM(s) IV Push every 8 hours PRN  raloxifene 60 milliGRAM(s) Oral daily  scopolamine 1 mG/72 Hr(s) Patch 1 Patch Transdermal every 72 hours  senna 2 Tablet(s) Oral at bedtime  silver sulfADIAZINE 1% Cream 1 Application(s) Topical two times a day  vancomycin  IVPB 1000 milliGRAM(s) IV Intermittent once      ANTIBIOTICS:  meropenem  IVPB 1000 milliGRAM(s) IV Intermittent every 8 hours  vancomycin  IVPB 1000 milliGRAM(s) IV Intermittent once

## 2024-04-23 NOTE — PROGRESS NOTE ADULT - SUBJECTIVE AND OBJECTIVE BOX
pt seen and examined.     My notes supersede resident's notes in case of discrepancy       ROS: no cp, no sob, no n/v, no fever    Vital Signs Last 24 Hrs  T(C): 36.9 (23 Apr 2024 05:03), Max: 38.9 (22 Apr 2024 21:53)  T(F): 98.5 (23 Apr 2024 05:03), Max: 102 (22 Apr 2024 21:53)  HR: 108 (23 Apr 2024 05:03) (81 - 112)  BP: 98/52 (23 Apr 2024 05:03) (81/55 - 98/52)  BP(mean): --  RR: 19 (23 Apr 2024 05:03) (19 - 19)  SpO2: 97% (23 Apr 2024 05:03) (96% - 98%)    Parameters below as of 23 Apr 2024 05:03  Patient On (Oxygen Delivery Method): mask, Venturi        physical exam  constitutional NAD, AAOX3, Respiratory  lungs CTA, CVS heart RRR, GI: abdomen Soft NT, ND, BS+, skin: intact  neuro exam no focal deficit     MEDICATIONS  (STANDING):  albuterol/ipratropium for Nebulization 3 milliLiter(s) Nebulizer every 6 hours  AQUAPHOR (petrolatum Ointment) 1 Application(s) Topical two times a day  artificial  tears Solution 1 Drop(s) Both EYES two times a day  calcium carbonate   1250 mG (OsCal) 1 Tablet(s) Oral daily  chlorhexidine 2% Cloths 1 Application(s) Topical daily  enoxaparin Injectable 30 milliGRAM(s) SubCutaneous every 24 hours  famotidine    Tablet 20 milliGRAM(s) Oral two times a day  gabapentin Solution 300 milliGRAM(s) Oral two times a day  hydrocortisone 1% Cream 1 Application(s) Topical daily  lactated ringers Bolus 500 milliLiter(s) IV Bolus once  levETIRAcetam  Solution 750 milliGRAM(s) Oral two times a day  meropenem  IVPB 1000 milliGRAM(s) IV Intermittent every 8 hours  midodrine 5 milliGRAM(s) Oral every 8 hours  raloxifene 60 milliGRAM(s) Oral daily  scopolamine 1 mG/72 Hr(s) Patch 1 Patch Transdermal every 72 hours  senna 2 Tablet(s) Oral at bedtime  silver sulfADIAZINE 1% Cream 1 Application(s) Topical two times a day    MEDICATIONS  (PRN):  acetaminophen     Tablet .. 650 milliGRAM(s) Oral every 6 hours PRN Temp greater or equal to 38C (100.4F), Mild Pain (1 - 3)  aluminum hydroxide/magnesium hydroxide/simethicone Suspension 30 milliLiter(s) Oral every 4 hours PRN Dyspepsia  melatonin 3 milliGRAM(s) Oral at bedtime PRN Insomnia  ondansetron Injectable 4 milliGRAM(s) IV Push every 8 hours PRN Nausea and/or Vomiting                            9.6    21.23 )-----------( 480      ( 23 Apr 2024 11:26 )             31.1     04-23    136  |  99  |  15  ----------------------------<  113<H>  4.5   |  30  |  <0.5<L>    Ca    8.8      23 Apr 2024 11:26  Mg     2.4     04-23    TPro  6.0  /  Alb  2.9<L>  /  TBili  <0.2  /  DBili  x   /  AST  13  /  ALT  10  /  AlkPhos  90  04-22    Procalcitonin, Serum: 6.93 ng/mL [0.02 - 0.10] (04-16-24 @ 07:03)  D-Dimer Assay, Quantitative: 397 ng/mL DDU (03-13-24 @ 07:08)  Procalcitonin, Serum: 0.19 ng/mL [0.02 - 0.10] (03-13-24 @ 07:08)    Culture - Blood (collected 21 Apr 2024 06:07)  Source: .Blood None  Preliminary Report (22 Apr 2024 16:01):    No growth at 24 hours    a/p  57yo F with PMH of Down's syndrome, cerebral palsy, seizure disorder presents from [facility] for respiratory distress. Admitted to SDU for severe sepsis and acute hypoxemic respiratory failure likely secondary to recurrent CAP.     # Acute hypoxemic respiratory failure , suspected aspiration pna, vomited again over the weekend, now stable resume diet via peg, smaller volume   # Severe sepsis with septic shock (WBC 12.25, Tmax 104.4F, 84% RA; hypotensive initially SBP 70s + source)  # Chronic hypotension   # Metabolic encephalopathy  - CXR: possible basilar opacities  - covid/RSV, MSRA, negative; Bl cx with contaminant   taper midodrine slowly dc florinef if bp stable   - blood cx 4/15: staph capitis  - blood cx 4/15 and 4/17: NGTD  - c/w Alicia for ( renewed in view of new leukocytosis and likely new infiltrate ( new aspiration )   - Scopolamine for secretions, aspiration precautions   - fungi-tell improving   repeat cxr today ( report pending)   recall ID , cont alicia  mrsa nasal swab neg on 4/16    # B/l feet ulcers  - no abscess/cellulitis  - colonized with ESBL strain  - off loading to prevent pressure sores   - wound care following     # Seizure disorder  # Down syndrome  # Nonverbal bedbound  # PEG tube   - c/w home meds, on keppra    #Progress Note Handoff    Pending :  id recall, repeat cultures,   Disposition: group home when stable   code status: dni, dnr .

## 2024-04-23 NOTE — PROGRESS NOTE ADULT - SUBJECTIVE AND OBJECTIVE BOX
24H events:    Patient is a 58y old Female who presents with a chief complaint of Pneumonia (22 Apr 2024 15:16)    Primary diagnosis of Acute sepsis      Day 1:  Day 2:  Day 3:     Today is hospital day 8d. This morning patient was seen and examined at bedside, resting comfortably in bed.    No acute or major events overnight. Hemodynamically stable, tolerating oral diet, voiding appropriately with appropriate bowel movements.     ~UPDATES for the day~    Code Status:    Family communication:  Contact date:  Name of person contacted:  Relationship to patient:  Communication details:  What matters most:    PAST MEDICAL & SURGICAL HISTORY  Down syndrome    Osteoporosis    Mild anemia    Neuropathy    S/P debridement  of R hip on 3/2/21      SOCIAL HISTORY:  Social History:      ALLERGIES:  No Known Allergies    MEDICATIONS:  STANDING MEDICATIONS  albuterol/ipratropium for Nebulization 3 milliLiter(s) Nebulizer every 6 hours  AQUAPHOR (petrolatum Ointment) 1 Application(s) Topical two times a day  artificial  tears Solution 1 Drop(s) Both EYES two times a day  calcium carbonate   1250 mG (OsCal) 1 Tablet(s) Oral daily  chlorhexidine 2% Cloths 1 Application(s) Topical daily  enoxaparin Injectable 30 milliGRAM(s) SubCutaneous every 24 hours  famotidine    Tablet 20 milliGRAM(s) Oral two times a day  gabapentin Solution 300 milliGRAM(s) Oral two times a day  hydrocortisone 1% Cream 1 Application(s) Topical daily  lactated ringers Bolus 500 milliLiter(s) IV Bolus once  levETIRAcetam  Solution 750 milliGRAM(s) Oral two times a day  meropenem  IVPB 1000 milliGRAM(s) IV Intermittent every 8 hours  midodrine 5 milliGRAM(s) Oral every 8 hours  raloxifene 60 milliGRAM(s) Oral daily  scopolamine 1 mG/72 Hr(s) Patch 1 Patch Transdermal every 72 hours  senna 2 Tablet(s) Oral at bedtime  silver sulfADIAZINE 1% Cream 1 Application(s) Topical two times a day    PRN MEDICATIONS  acetaminophen     Tablet .. 650 milliGRAM(s) Oral every 6 hours PRN  aluminum hydroxide/magnesium hydroxide/simethicone Suspension 30 milliLiter(s) Oral every 4 hours PRN  melatonin 3 milliGRAM(s) Oral at bedtime PRN  ondansetron Injectable 4 milliGRAM(s) IV Push every 8 hours PRN        ROS:  General: Denies fever, chills, nightsweats  HEENT: Denies eye pain changes in vision, ear pain, sore throat  CV: Denies chest pain, palpitations  Pulm: Denies cough, wheezing, SOB  GI: Denies N/V/D  : Denes dysuria  MSK: Denies arthralgia, myalgia  Skin: Denies rashes, lesions  Neuro: Denies paresthesias, weakness    VITALS:   T(F): 98.5  HR: 108  BP: 98/52  RR: 19  SpO2: 97%    PHYSICAL EXAM:  GENERAL: NAD, lying in bed comfortably, obtunded, lethargic,  somnolent, cooperative, elderly  HEAD: Normocephalic  HEART: Regular rate and rhythm, normal S1/S2, no murmurs, heaves, thrills  LUNGS: No acute respiratory distress, clear b/l breath sounds, no wheezing, rales, rhonchi  ABDOMEN:  soft, nontender, nondistended, + BS  EXTREMITIES: no rashes, extremities warm/dry, no cyanosis, no edema, ulcerations or ecchymosis  NERVOUS SYSTEM:  A&Ox3, follows commands, answers questions appropriately   SKIN: No rashes or lesions       AMPAC score:    (  ) Indwelling Madsen Catheter:   Date insterted:    Reason (  ) Critical illness     (  ) urinary retention    (  ) Accurate Ins/Outs Monitoring     (  ) CMO patient    (  ) Central Line:   Date inserted:  Location: (  ) Right IJ     (  ) Left IJ     (  ) Right Fem     (  ) Left Fem    (  ) SPC        (  ) pigtail       (  ) PEG tube       (  ) colostomy       (  ) jejunostomy  (  ) U-Dall    LABS:                        9.1    16.64 )-----------( 415      ( 22 Apr 2024 08:30 )             29.8     04-22    140  |  102  |  13  ----------------------------<  123<H>  4.5   |  26  |  0.5<L>    Ca    8.8      22 Apr 2024 08:30  Mg     2.4     04-22    TPro  6.0  /  Alb  2.9<L>  /  TBili  <0.2  /  DBili  x   /  AST  13  /  ALT  10  /  AlkPhos  90  04-22      Urinalysis Basic - ( 22 Apr 2024 08:30 )    Color: x / Appearance: x / SG: x / pH: x  Gluc: 123 mg/dL / Ketone: x  / Bili: x / Urobili: x   Blood: x / Protein: x / Nitrite: x   Leuk Esterase: x / RBC: x / WBC x   Sq Epi: x / Non Sq Epi: x / Bacteria: x            Culture - Blood (collected 21 Apr 2024 06:07)  Source: .Blood None  Preliminary Report (22 Apr 2024 16:01):    No growth at 24 hours          RADIOLOGY:    RADIOLOGY     24H events:    Patient is a 58y old Female who presents with a chief complaint of Pneumonia (22 Apr 2024 15:16)    Primary diagnosis of Acute sepsis    Today is hospital day 8d. This morning patient was seen and examined at bedside, resting in bed.    No acute or major events overnight. Hemodynamically stable, tolerating oral diet, voiding appropriately with appropriate bowel movements.       PAST MEDICAL & SURGICAL HISTORY  Down syndrome    Osteoporosis    Mild anemia    Neuropathy    S/P debridement  of R hip on 3/2/21      SOCIAL HISTORY:  Social History:      ALLERGIES:  No Known Allergies    MEDICATIONS:  STANDING MEDICATIONS  albuterol/ipratropium for Nebulization 3 milliLiter(s) Nebulizer every 6 hours  AQUAPHOR (petrolatum Ointment) 1 Application(s) Topical two times a day  artificial  tears Solution 1 Drop(s) Both EYES two times a day  calcium carbonate   1250 mG (OsCal) 1 Tablet(s) Oral daily  chlorhexidine 2% Cloths 1 Application(s) Topical daily  enoxaparin Injectable 30 milliGRAM(s) SubCutaneous every 24 hours  famotidine    Tablet 20 milliGRAM(s) Oral two times a day  gabapentin Solution 300 milliGRAM(s) Oral two times a day  hydrocortisone 1% Cream 1 Application(s) Topical daily  lactated ringers Bolus 500 milliLiter(s) IV Bolus once  levETIRAcetam  Solution 750 milliGRAM(s) Oral two times a day  meropenem  IVPB 1000 milliGRAM(s) IV Intermittent every 8 hours  midodrine 5 milliGRAM(s) Oral every 8 hours  raloxifene 60 milliGRAM(s) Oral daily  scopolamine 1 mG/72 Hr(s) Patch 1 Patch Transdermal every 72 hours  senna 2 Tablet(s) Oral at bedtime  silver sulfADIAZINE 1% Cream 1 Application(s) Topical two times a day    PRN MEDICATIONS  acetaminophen     Tablet .. 650 milliGRAM(s) Oral every 6 hours PRN  aluminum hydroxide/magnesium hydroxide/simethicone Suspension 30 milliLiter(s) Oral every 4 hours PRN  melatonin 3 milliGRAM(s) Oral at bedtime PRN  ondansetron Injectable 4 milliGRAM(s) IV Push every 8 hours PRN        ROS:  Unable to obtain due to patient status    VITALS:   T(F): 98.5  HR: 108  BP: 98/52  RR: 19  SpO2: 97%    PHYSICAL EXAM:  GENERAL: NAD, lying in bed comfortably, lethargic, elderly  HEAD: Normocephalic  HEART: Regular rate and rhythm, normal S1/S2, no murmurs, heaves, thrills  LUNGS: No acute respiratory distress, clear b/l breath sounds, no wheezing, rales, rhonchi  ABDOMEN:  soft, nontender, nondistended, + BS  EXTREMITIES: ulcers on b/l LE, no rashes, extremities warm/dry, no cyanosis, no edema,  NERVOUS SYSTEM:  A&Ox1    SKIN: No rashes         LABS:                        9.1    16.64 )-----------( 415      ( 22 Apr 2024 08:30 )             29.8     04-22    140  |  102  |  13  ----------------------------<  123<H>  4.5   |  26  |  0.5<L>    Ca    8.8      22 Apr 2024 08:30  Mg     2.4     04-22    TPro  6.0  /  Alb  2.9<L>  /  TBili  <0.2  /  DBili  x   /  AST  13  /  ALT  10  /  AlkPhos  90  04-22      Urinalysis Basic - ( 22 Apr 2024 08:30 )    Color: x / Appearance: x / SG: x / pH: x  Gluc: 123 mg/dL / Ketone: x  / Bili: x / Urobili: x   Blood: x / Protein: x / Nitrite: x   Leuk Esterase: x / RBC: x / WBC x   Sq Epi: x / Non Sq Epi: x / Bacteria: x            Culture - Blood (collected 21 Apr 2024 06:07)  Source: .Blood None  Preliminary Report (22 Apr 2024 16:01):    No growth at 24 hours          RADIOLOGY:    RADIOLOGY    4/22/24 CXR  Lungs/ Pleura: Bilateral patchy opacities, increased within the right upper lobe since prior.     24H events:    Patient is a 58y old Female who presents with a chief complaint of Pneumonia (23 Apr 2024 13:38)    Primary diagnosis of Acute sepsis      Today is hospital day 8d. This morning patient was seen and examined at bedside, resting in bed.    Patient is suspected for aspiration pneumonia and WBC are uptrending. Hemodynamically stable, tolerating oral diet, voiding appropriately with appropriate bowel movements.       PAST MEDICAL & SURGICAL HISTORY  Down syndrome    Osteoporosis    Mild anemia    Neuropathy    S/P debridement  of R hip on 3/2/21      SOCIAL HISTORY:  Social History:      ALLERGIES:  No Known Allergies    MEDICATIONS:  STANDING MEDICATIONS  albuterol/ipratropium for Nebulization 3 milliLiter(s) Nebulizer every 6 hours  AQUAPHOR (petrolatum Ointment) 1 Application(s) Topical two times a day  artificial  tears Solution 1 Drop(s) Both EYES two times a day  calcium carbonate   1250 mG (OsCal) 1 Tablet(s) Oral daily  chlorhexidine 2% Cloths 1 Application(s) Topical daily  enoxaparin Injectable 30 milliGRAM(s) SubCutaneous every 24 hours  famotidine    Tablet 20 milliGRAM(s) Oral two times a day  gabapentin Solution 300 milliGRAM(s) Oral two times a day  hydrocortisone 1% Cream 1 Application(s) Topical daily  lactated ringers Bolus 500 milliLiter(s) IV Bolus once  levETIRAcetam  Solution 750 milliGRAM(s) Oral two times a day  meropenem  IVPB 1000 milliGRAM(s) IV Intermittent every 8 hours  midodrine 5 milliGRAM(s) Oral every 8 hours  raloxifene 60 milliGRAM(s) Oral daily  scopolamine 1 mG/72 Hr(s) Patch 1 Patch Transdermal every 72 hours  senna 2 Tablet(s) Oral at bedtime  silver sulfADIAZINE 1% Cream 1 Application(s) Topical two times a day    PRN MEDICATIONS  acetaminophen     Tablet .. 650 milliGRAM(s) Oral every 6 hours PRN  aluminum hydroxide/magnesium hydroxide/simethicone Suspension 30 milliLiter(s) Oral every 4 hours PRN  melatonin 3 milliGRAM(s) Oral at bedtime PRN  ondansetron Injectable 4 milliGRAM(s) IV Push every 8 hours PRN        ROS:  unable to obtain due to patient status    VITALS:   T(F): 98.5  HR: 108  BP: 98/52  RR: 19  SpO2: 97%    PHYSICAL EXAM:  GENERAL: NAD, lying in bed comfortably, lethargic, elderly  HEAD: Normocephalic  HEART: Regular rate and rhythm, normal S1/S2, no murmurs, heaves, thrills  LUNGS: wheezing b/l  ABDOMEN:  soft, nontender, nondistended, + BS  EXTREMITIES: LE ulcers, no rashes, extremities warm/dry, no cyanosis, no edema  NERVOUS SYSTEM:  A&Ox1   SKIN: No rashes or lesions           LABS:                        9.6    21.23 )-----------( 480      ( 23 Apr 2024 11:26 )             31.1     04-23    136  |  99  |  15  ----------------------------<  113<H>  4.5   |  30  |  <0.5<L>    Ca    8.8      23 Apr 2024 11:26  Mg     2.4     04-23    TPro  6.0  /  Alb  2.9<L>  /  TBili  <0.2  /  DBili  x   /  AST  13  /  ALT  10  /  AlkPhos  90  04-22      Urinalysis Basic - ( 23 Apr 2024 11:26 )    Color: x / Appearance: x / SG: x / pH: x  Gluc: 113 mg/dL / Ketone: x  / Bili: x / Urobili: x   Blood: x / Protein: x / Nitrite: x   Leuk Esterase: x / RBC: x / WBC x   Sq Epi: x / Non Sq Epi: x / Bacteria: x            Culture - Blood (collected 21 Apr 2024 06:07)  Source: .Blood None  Preliminary Report (22 Apr 2024 16:01):    No growth at 24 hours          RADIOLOGY:    RADIOLOGY    CXR 4/22/24  Lungs/ Pleura: Bilateral patchy opacities, increased within the right upper lobe since prior.

## 2024-04-23 NOTE — PROGRESS NOTE ADULT - ASSESSMENT
59yo F w/ pmhx of Down's syndrome, cerebral palsy, seizure disorder presents from [facility] for respiratory distress. Admitted to SDU for severe sepsis and acute hypoxemic respiratory failure likely secondary to recurrent CAP.     #acute hypoxemic respiratory failure   #Severe sepsis with septic shock(WBC 12.25, Tmax 104.4F, 84% RA; hypotensive initially SBP 70s + source)  #possible recurrent CAP   #chronic hypotension   # metabolic encephalopathy  - CXR: possible basilar opacities  - covid/RSV, MSRA, negative; Bl cx with contaminant   - C/w Midodrine 5mg  - target MAP 60 ( Patient always has Bp on the softer side )   - blood cx 4/15: staph capitis  - blood cx 4/15 and 4/17: NGTD  - c/w Alicia for total of 7 days (4/23 -) due to increase WBC 4/22  - Scopolamine for secretions. aspiration precautions   - fungitell improving   - Palliative care following  - F/u bcx; Blood culture Negative since 15th  - f/u AM labs 4/23 for WBC trend      #Episodes of Vomiting:   - On PEG feed   - Aspiration risk ; C Xray done   - Can restart the PEG feeding for now     # B/l feet ulcers  - No abscess/cellulitis  - colonized with ESBL strain  -Off loading to prevent pressure sores   - wound care following     #seizure disorder  #Down syndrome  #Nonverbal bedbound  # PEG tube   - c/w home meds   57yo F w/ pmhx of Down's syndrome, cerebral palsy, seizure disorder presents from [facility] for respiratory distress. Admitted to SDU for severe sepsis and acute hypoxemic respiratory failure likely secondary to recurrent CAP.     #acute hypoxemic respiratory failure   #Severe sepsis with septic shock(WBC 12.25, Tmax 104.4F, 84% RA; hypotensive initially SBP 70s + source)  #possible recurrent CAP   #chronic hypotension   # metabolic encephalopathy  - CXR: possible basilar opacities  - covid/RSV, MSRA, negative; Bl cx with contaminant   - C/w Midodrine 5mg  - target MAP 60 ( Patient always has Bp on the softer side )   - blood cx 4/15: staph capitis  - blood cx 4/15 and 4/17: NGTD  - c/w Alicia for total of 7 days (4/23 -) due to increase WBC (16 at 4/22 to 21 at 4/23)  - Scopolamine for secretions. aspiration precautions   - fungitell improving   - Palliative care following  - F/u bcx; Blood culture Negative since 15th      #Episodes of Vomiting:   - On PEG feed   - Aspiration risk ; C Xray done   - Can restart the PEG feeding for now     # B/l feet ulcers  - No abscess/cellulitis  - colonized with ESBL strain  -Off loading to prevent pressure sores   - wound care following     #seizure disorder  #Down syndrome  #Nonverbal bedbound  # PEG tube   - c/w home meds   57yo F w/ pmhx of Down's syndrome, cerebral palsy, seizure disorder presents from [facility] for respiratory distress. Admitted to SDU for severe sepsis and acute hypoxemic respiratory failure likely secondary to recurrent CAP.     #acute hypoxemic respiratory failure   #Severe sepsis with septic shock  #possible recurrent CAP   #chronic hypotension   #metabolic encephalopathy  - CXR: possible basilar opacities  - covid/RSV, MSRA, negative; Bl cx with contaminant   - C/w Midodrine 5mg  - target MAP 60 ( Patient always has Bp on the softer side )   - blood cx 4/15: staph capitis  - blood cx 4/15 and 4/17: NGTD  - Scopolamine for secretions. aspiration precautions   - Palliative care following  - completed course of Meropenem   - 4/22: spiked fever and WBC  - restarted Alicia per ID  - F/u bcx; Blood culture Negative since 15th    #Episodes of Vomiting:   - On PEG feed   - Aspiration risk ; C Xray done   - Can restart the PEG feeding for now     # B/l feet ulcers  - No abscess/cellulitis  - colonized with ESBL strain  - Off loading to prevent pressure sores   - wound care following     #seizure disorder  #Down syndrome  #Nonverbal bedbound  # PEG tube   - c/w home meds

## 2024-04-24 LAB
ANION GAP SERPL CALC-SCNC: 9 MMOL/L — SIGNIFICANT CHANGE UP (ref 7–14)
BASOPHILS # BLD AUTO: 0.05 K/UL — SIGNIFICANT CHANGE UP (ref 0–0.2)
BASOPHILS NFR BLD AUTO: 0.2 % — SIGNIFICANT CHANGE UP (ref 0–1)
BUN SERPL-MCNC: 19 MG/DL — SIGNIFICANT CHANGE UP (ref 10–20)
CALCIUM SERPL-MCNC: 8.3 MG/DL — LOW (ref 8.4–10.5)
CHLORIDE SERPL-SCNC: 101 MMOL/L — SIGNIFICANT CHANGE UP (ref 98–110)
CO2 SERPL-SCNC: 27 MMOL/L — SIGNIFICANT CHANGE UP (ref 17–32)
CREAT SERPL-MCNC: <0.5 MG/DL — LOW (ref 0.7–1.5)
CULTURE RESULTS: SIGNIFICANT CHANGE UP
EGFR: 124 ML/MIN/1.73M2 — SIGNIFICANT CHANGE UP
EOSINOPHIL # BLD AUTO: 0.09 K/UL — SIGNIFICANT CHANGE UP (ref 0–0.7)
EOSINOPHIL NFR BLD AUTO: 0.4 % — SIGNIFICANT CHANGE UP (ref 0–8)
GLUCOSE BLDC GLUCOMTR-MCNC: 126 MG/DL — HIGH (ref 70–99)
GLUCOSE SERPL-MCNC: 188 MG/DL — HIGH (ref 70–99)
HCT VFR BLD CALC: 30.1 % — LOW (ref 37–47)
HGB BLD-MCNC: 9.1 G/DL — LOW (ref 12–16)
IMM GRANULOCYTES NFR BLD AUTO: 0.9 % — HIGH (ref 0.1–0.3)
LACTATE SERPL-SCNC: 1.2 MMOL/L — SIGNIFICANT CHANGE UP (ref 0.7–2)
LYMPHOCYTES # BLD AUTO: 1.42 K/UL — SIGNIFICANT CHANGE UP (ref 1.2–3.4)
LYMPHOCYTES # BLD AUTO: 6.1 % — LOW (ref 20.5–51.1)
MAGNESIUM SERPL-MCNC: 2.2 MG/DL — SIGNIFICANT CHANGE UP (ref 1.8–2.4)
MCHC RBC-ENTMCNC: 28.1 PG — SIGNIFICANT CHANGE UP (ref 27–31)
MCHC RBC-ENTMCNC: 30.2 G/DL — LOW (ref 32–37)
MCV RBC AUTO: 92.9 FL — SIGNIFICANT CHANGE UP (ref 81–99)
MONOCYTES # BLD AUTO: 0.47 K/UL — SIGNIFICANT CHANGE UP (ref 0.1–0.6)
MONOCYTES NFR BLD AUTO: 2 % — SIGNIFICANT CHANGE UP (ref 1.7–9.3)
NEUTROPHILS # BLD AUTO: 21.14 K/UL — HIGH (ref 1.4–6.5)
NEUTROPHILS NFR BLD AUTO: 90.4 % — HIGH (ref 42.2–75.2)
NRBC # BLD: 0 /100 WBCS — SIGNIFICANT CHANGE UP (ref 0–0)
PLATELET # BLD AUTO: 466 K/UL — HIGH (ref 130–400)
PMV BLD: 10.3 FL — SIGNIFICANT CHANGE UP (ref 7.4–10.4)
POTASSIUM SERPL-MCNC: 4.8 MMOL/L — SIGNIFICANT CHANGE UP (ref 3.5–5)
POTASSIUM SERPL-SCNC: 4.8 MMOL/L — SIGNIFICANT CHANGE UP (ref 3.5–5)
RBC # BLD: 3.24 M/UL — LOW (ref 4.2–5.4)
RBC # FLD: 23.2 % — HIGH (ref 11.5–14.5)
SODIUM SERPL-SCNC: 137 MMOL/L — SIGNIFICANT CHANGE UP (ref 135–146)
SPECIMEN SOURCE: SIGNIFICANT CHANGE UP
WBC # BLD: 23.39 K/UL — HIGH (ref 4.8–10.8)
WBC # FLD AUTO: 23.39 K/UL — HIGH (ref 4.8–10.8)

## 2024-04-24 PROCEDURE — 99232 SBSQ HOSP IP/OBS MODERATE 35: CPT

## 2024-04-24 PROCEDURE — 99233 SBSQ HOSP IP/OBS HIGH 50: CPT

## 2024-04-24 PROCEDURE — 93010 ELECTROCARDIOGRAM REPORT: CPT

## 2024-04-24 PROCEDURE — 71275 CT ANGIOGRAPHY CHEST: CPT | Mod: 26

## 2024-04-24 RX ORDER — SODIUM CHLORIDE 9 MG/ML
500 INJECTION, SOLUTION INTRAVENOUS ONCE
Refills: 0 | Status: COMPLETED | OUTPATIENT
Start: 2024-04-24 | End: 2024-04-24

## 2024-04-24 RX ADMIN — Medication 3 MILLILITER(S): at 07:18

## 2024-04-24 RX ADMIN — FAMOTIDINE 20 MILLIGRAM(S): 10 INJECTION INTRAVENOUS at 05:58

## 2024-04-24 RX ADMIN — MEROPENEM 100 MILLIGRAM(S): 1 INJECTION INTRAVENOUS at 05:57

## 2024-04-24 RX ADMIN — Medication 1 DROP(S): at 05:57

## 2024-04-24 RX ADMIN — Medication 650 MILLIGRAM(S): at 11:20

## 2024-04-24 RX ADMIN — FAMOTIDINE 20 MILLIGRAM(S): 10 INJECTION INTRAVENOUS at 18:32

## 2024-04-24 RX ADMIN — SODIUM CHLORIDE 1500 MILLILITER(S): 9 INJECTION, SOLUTION INTRAVENOUS at 11:19

## 2024-04-24 RX ADMIN — Medication 1 DROP(S): at 18:32

## 2024-04-24 RX ADMIN — Medication 1 APPLICATION(S): at 05:57

## 2024-04-24 RX ADMIN — RALOXIFENE HYDROCHLORIDE 60 MILLIGRAM(S): 60 TABLET, COATED ORAL at 11:20

## 2024-04-24 RX ADMIN — MIDODRINE HYDROCHLORIDE 5 MILLIGRAM(S): 2.5 TABLET ORAL at 05:58

## 2024-04-24 RX ADMIN — CHLORHEXIDINE GLUCONATE 1 APPLICATION(S): 213 SOLUTION TOPICAL at 11:11

## 2024-04-24 RX ADMIN — LEVETIRACETAM 750 MILLIGRAM(S): 250 TABLET, FILM COATED ORAL at 05:58

## 2024-04-24 RX ADMIN — Medication 1 APPLICATION(S): at 18:32

## 2024-04-24 RX ADMIN — Medication 1 TABLET(S): at 14:27

## 2024-04-24 RX ADMIN — SENNA PLUS 2 TABLET(S): 8.6 TABLET ORAL at 21:37

## 2024-04-24 RX ADMIN — MIDODRINE HYDROCHLORIDE 5 MILLIGRAM(S): 2.5 TABLET ORAL at 14:28

## 2024-04-24 RX ADMIN — MIDODRINE HYDROCHLORIDE 5 MILLIGRAM(S): 2.5 TABLET ORAL at 21:38

## 2024-04-24 RX ADMIN — Medication 1 APPLICATION(S): at 18:00

## 2024-04-24 RX ADMIN — LEVETIRACETAM 750 MILLIGRAM(S): 250 TABLET, FILM COATED ORAL at 18:32

## 2024-04-24 RX ADMIN — Medication 1 APPLICATION(S): at 12:59

## 2024-04-24 RX ADMIN — GABAPENTIN 300 MILLIGRAM(S): 400 CAPSULE ORAL at 18:32

## 2024-04-24 RX ADMIN — GABAPENTIN 300 MILLIGRAM(S): 400 CAPSULE ORAL at 05:58

## 2024-04-24 RX ADMIN — ENOXAPARIN SODIUM 30 MILLIGRAM(S): 100 INJECTION SUBCUTANEOUS at 14:29

## 2024-04-24 RX ADMIN — Medication 3 MILLILITER(S): at 20:23

## 2024-04-24 RX ADMIN — SCOPALAMINE 1 PATCH: 1 PATCH, EXTENDED RELEASE TRANSDERMAL at 05:58

## 2024-04-24 RX ADMIN — MEROPENEM 100 MILLIGRAM(S): 1 INJECTION INTRAVENOUS at 14:25

## 2024-04-24 RX ADMIN — MEROPENEM 100 MILLIGRAM(S): 1 INJECTION INTRAVENOUS at 21:37

## 2024-04-24 NOTE — PROGRESS NOTE ADULT - ASSESSMENT
IMPRESSION:    Acute hypoxemic respiratory failure on NC   PNA possible aspiration/ recurrent admission  Sepsis POA  HO DVT  HO GI bleed  HO OM  HO recent duodenal perforation   HO polymicrobial bacteremia   H/o CP, DS  H/o seizures    PLAN:    CNS:  Avoid CNS Depressant, AED    HEENT: Oral care. Eye drops.    PULMONARY: HOB at 45 degrees.  Aspiration precaution. Wean FiO2 Keep spo2 92 TO 96%. CHEST PT. Nebs q6 . NC. Can trial NIV    CARDIOVASCULAR: Keep MAP more than 60. Avoid overload. Can increase midodrine to 10q8h     GI: Protonix. NGT. Aspiration precautions.     INFECTIOUS DISEASE:  ABX per ID    HEMATOLOGICAL:  DVT px, Monitor CBC.     ENDOCRINE:  Follow up FS.  Insulin protocol if needed.    MUSCULOSKELETAL: Bedrest.  Off loading.  Wound care.    Prognosis very poor.  Palliative care following  DNR DNI   Patient would not be likely to benefit from SDU or ICU level of care.  IMPRESSION:    Acute hypoxemic respiratory failure on NC   PNA possible aspiration/ recurrent admission  Sepsis POA  HO DVT  HO GI bleed  HO OM  HO recent duodenal perforation   HO polymicrobial bacteremia   H/o CP, DS  H/o seizures    PLAN:    CNS:  Avoid CNS Depressant, AED    HEENT: Oral care. Eye drops.    PULMONARY: HOB at 45 degrees.  Aspiration precaution. Wean FiO2 Keep spo2 92 TO 96%. CHEST PT. Nebs q6 . NC. NIV as needed.    CARDIOVASCULAR: Keep MAP more than 60. Avoid overload. Can increase midodrine to 10q8h     GI: Protonix. NGT. Aspiration precautions.     INFECTIOUS DISEASE:  ABX per ID; leukocytosis worsening; ID follow up.     HEMATOLOGICAL:  DVT px, Monitor CBC.     ENDOCRINE:  Follow up FS.  Insulin protocol if needed.    MUSCULOSKELETAL: Bedrest.  Off loading.  Wound care.    Prognosis very poor.  Palliative care following  DNR DNI   Patient would not be likely to benefit from SDU or ICU level of care.   Recall as needed.

## 2024-04-24 NOTE — PROGRESS NOTE ADULT - SUBJECTIVE AND OBJECTIVE BOX
SUBJECTIVE:    Patient is a 58y old Female who presents with a chief complaint of Pneumonia (24 Apr 2024 11:59)    Currently admitted to medicine with the primary diagnosis of:    Today is hospital day 9d.     Overnight Events:     ill appearing, febirle, tachpneic    PAST MEDICAL & SURGICAL HISTORY  Down syndrome    Osteoporosis    Mild anemia    Neuropathy    S/P debridement  of R hip on 3/2/21        SOCIAL HISTORY:  Smoking history:  Alcohol Use;  Illicit Drug Use:    ALLERGIES:  No Known Allergies    MEDICATIONS:  STANDING MEDICATIONS  albuterol/ipratropium for Nebulization 3 milliLiter(s) Nebulizer every 6 hours  AQUAPHOR (petrolatum Ointment) 1 Application(s) Topical two times a day  artificial  tears Solution 1 Drop(s) Both EYES two times a day  calcium carbonate   1250 mG (OsCal) 1 Tablet(s) Oral daily  chlorhexidine 2% Cloths 1 Application(s) Topical daily  enoxaparin Injectable 30 milliGRAM(s) SubCutaneous every 24 hours  famotidine    Tablet 20 milliGRAM(s) Oral two times a day  gabapentin Solution 300 milliGRAM(s) Oral two times a day  hydrocortisone 1% Cream 1 Application(s) Topical daily  lactated ringers Bolus 500 milliLiter(s) IV Bolus once  levETIRAcetam  Solution 750 milliGRAM(s) Oral two times a day  meropenem  IVPB 1000 milliGRAM(s) IV Intermittent every 8 hours  midodrine 5 milliGRAM(s) Oral every 8 hours  raloxifene 60 milliGRAM(s) Oral daily  scopolamine 1 mG/72 Hr(s) Patch 1 Patch Transdermal every 72 hours  senna 2 Tablet(s) Oral at bedtime  silver sulfADIAZINE 1% Cream 1 Application(s) Topical two times a day    PRN MEDICATIONS  acetaminophen     Tablet .. 650 milliGRAM(s) Oral every 6 hours PRN  aluminum hydroxide/magnesium hydroxide/simethicone Suspension 30 milliLiter(s) Oral every 4 hours PRN  melatonin 3 milliGRAM(s) Oral at bedtime PRN  ondansetron Injectable 4 milliGRAM(s) IV Push every 8 hours PRN    VITALS:   ICU Vital Signs Last 24 Hrs  T(C): 37.7 (24 Apr 2024 20:00), Max: 38.7 (24 Apr 2024 11:00)  T(F): 99.8 (24 Apr 2024 20:00), Max: 101.7 (24 Apr 2024 11:00)  HR: 60 (24 Apr 2024 20:00) (60 - 132)  BP: 127/56 (24 Apr 2024 20:00) (91/54 - 127/56)  BP(mean): --  ABP: --  ABP(mean): --  RR: 19 (24 Apr 2024 20:00) (19 - 20)  SpO2: 96% (24 Apr 2024 20:00) (95% - 96%)    O2 Parameters below as of 24 Apr 2024 15:00  Patient On (Oxygen Delivery Method): nasal cannula  O2 Flow (L/min): 4          LABS:                        9.1    23.39 )-----------( 466      ( 24 Apr 2024 05:57 )             30.1     04-24    137  |  101  |  19  ----------------------------<  188<H>  4.8   |  27  |  <0.5<L>    Ca    8.3<L>      24 Apr 2024 05:57  Mg     2.2     04-24        Urinalysis Basic - ( 24 Apr 2024 05:57 )    Color: x / Appearance: x / SG: x / pH: x  Gluc: 188 mg/dL / Ketone: x  / Bili: x / Urobili: x   Blood: x / Protein: x / Nitrite: x   Leuk Esterase: x / RBC: x / WBC x   Sq Epi: x / Non Sq Epi: x / Bacteria: x        Lactate, Blood: 1.2 mmol/L (04-24-24 @ 05:57)      Culture - Blood (collected 23 Apr 2024 15:26)  Source: .Blood Blood  Preliminary Report (24 Apr 2024 20:01):    No growth at 24 hours            RADIOLOGY:    PHYSICAL EXAM:    GENERAL: ill apparing  CHEST/LUNG: dec bs. rhonci  HEART: Regular rate and rhythm; No murmurs, rubs, or gallops  ABDOMEN: Bowel sounds present; Soft, Nontender, Nondistended. No hepatomegally  EXTREMITIES:  contracted  NERVOUS SYSTEM:  Alert & Oriented X0

## 2024-04-24 NOTE — PROGRESS NOTE ADULT - SUBJECTIVE AND OBJECTIVE BOX
Patient is a 58y old  Female who presents with a chief complaint of Pneumonia (24 Apr 2024 11:18)        Over Night Events:  Events noted. Patient on 6L nasal canula. Not on pressors.       ROS:     All ROS are negative except HPI         PHYSICAL EXAM    ICU Vital Signs Last 24 Hrs  T(C): 37.4 (24 Apr 2024 05:10), Max: 38.7 (23 Apr 2024 14:16)  T(F): 99.3 (24 Apr 2024 05:10), Max: 101.7 (23 Apr 2024 14:16)  HR: 132 (24 Apr 2024 05:10) (121 - 132)  BP: 107/59 (24 Apr 2024 05:10) (90/53 - 107/59)  BP(mean): --  ABP: --  ABP(mean): --  RR: 20 (24 Apr 2024 05:10) (20 - 20)  SpO2: 96% (24 Apr 2024 05:10) (88% - 96%)    O2 Parameters below as of 24 Apr 2024 05:10  Patient On (Oxygen Delivery Method): mask, Venturi            CONSTITUTIONAL:  ill appearing in NAD    ENT:   Airway patent,   Mouth with normal mucosa.     EYES:   Pupils equal,   Round and reactive to light.    CARDIAC:   tachycardia   No edema    Vascular:  Normal systolic impulse  No Carotid bruits    RESPIRATORY:   Decreased bilateral BS  Normal chest expansion  Not tachypneic,  No use of accessory muscles    GASTROINTESTINAL:  Abdomen soft,   Non-tender,   No guarding,   + BS    MUSCULOSKELETAL:   Range of motion is limited,  No clubbing, cyanosis    NEUROLOGICAL:   Lethargic   arrousable   Does not follow commands       04-23-24 @ 07:01  -  04-24-24 @ 07:00  --------------------------------------------------------  IN:    Enteral Tube Flush: 720 mL  Total IN: 720 mL    OUT:    Voided (mL): 1100 mL  Total OUT: 1100 mL    Total NET: -380 mL          LABS:                            9.1    23.39 )-----------( 466      ( 24 Apr 2024 05:57 )             30.1                                               04-24    137  |  101  |  19  ----------------------------<  188<H>  4.8   |  27  |  <0.5<L>    Ca    8.3<L>      24 Apr 2024 05:57  Mg     2.2     04-24                                               Urinalysis Basic - ( 24 Apr 2024 05:57 )    Color: x / Appearance: x / SG: x / pH: x  Gluc: 188 mg/dL / Ketone: x  / Bili: x / Urobili: x   Blood: x / Protein: x / Nitrite: x   Leuk Esterase: x / RBC: x / WBC x   Sq Epi: x / Non Sq Epi: x / Bacteria: x                                                                                                                                                                                MEDICATIONS  (STANDING):  albuterol/ipratropium for Nebulization 3 milliLiter(s) Nebulizer every 6 hours  AQUAPHOR (petrolatum Ointment) 1 Application(s) Topical two times a day  artificial  tears Solution 1 Drop(s) Both EYES two times a day  calcium carbonate   1250 mG (OsCal) 1 Tablet(s) Oral daily  chlorhexidine 2% Cloths 1 Application(s) Topical daily  enoxaparin Injectable 30 milliGRAM(s) SubCutaneous every 24 hours  famotidine    Tablet 20 milliGRAM(s) Oral two times a day  gabapentin Solution 300 milliGRAM(s) Oral two times a day  hydrocortisone 1% Cream 1 Application(s) Topical daily  lactated ringers Bolus 500 milliLiter(s) IV Bolus once  levETIRAcetam  Solution 750 milliGRAM(s) Oral two times a day  meropenem  IVPB 1000 milliGRAM(s) IV Intermittent every 8 hours  midodrine 5 milliGRAM(s) Oral every 8 hours  raloxifene 60 milliGRAM(s) Oral daily  scopolamine 1 mG/72 Hr(s) Patch 1 Patch Transdermal every 72 hours  senna 2 Tablet(s) Oral at bedtime  silver sulfADIAZINE 1% Cream 1 Application(s) Topical two times a day    MEDICATIONS  (PRN):  acetaminophen     Tablet .. 650 milliGRAM(s) Oral every 6 hours PRN Temp greater or equal to 38C (100.4F), Mild Pain (1 - 3)  aluminum hydroxide/magnesium hydroxide/simethicone Suspension 30 milliLiter(s) Oral every 4 hours PRN Dyspepsia  melatonin 3 milliGRAM(s) Oral at bedtime PRN Insomnia  ondansetron Injectable 4 milliGRAM(s) IV Push every 8 hours PRN Nausea and/or Vomiting      New X-rays reviewed:                                                                                  ECHO    CXR interpreted by me:

## 2024-04-24 NOTE — PROGRESS NOTE ADULT - ASSESSMENT
59yo F w/ pmhx of Down's syndrome, cerebral palsy, seizure disorder presents from [facility] for respiratory distress. Admitted to SDU for severe sepsis and acute hypoxemic respiratory failure likely secondary to recurrent CAP.     #acute hypoxemic respiratory failure   #Severe sepsis with septic shock  #possible recurrent CAP   #chronic hypotension   #metabolic encephalopathy  - CXR: possible basilar opacities  - covid/RSV, MSRA, negative; Bl cx with contaminant   - C/w Midodrine 5mg  - target MAP 60 ( Patient always has Bp on the softer side )   - blood cx 4/15: staph capitis  - blood cx 4/15 and 4/17: NGTD  - Scopolamine for secretions. aspiration precautions   - Palliative care following  - completed course of Meropenem   - 4/22 and 4/23: spiked fever and WBC  - fluid bolus given  - restarted Alicia per ID  - EKG 4/24 sinus tachy QTc 420, unremarkable otherwise  - F/u bcx 4/23  - f/u CT chest angio w/ contrast to rule out PE  - switch venturi mask to 6L nc    #Episodes of Vomiting:   - On PEG feed   - Aspiration risk ; C Xray done   - Can restart the PEG feeding for now     # B/l feet ulcers  - No abscess/cellulitis  - colonized with ESBL strain  - Off loading to prevent pressure sores   - wound care following     #seizure disorder  #Down syndrome  #Nonverbal bedbound  # PEG tube   - c/w home meds

## 2024-04-24 NOTE — PROGRESS NOTE ADULT - ASSESSMENT
59yo F with PMH of Down's syndrome, cerebral palsy, seizure disorder presents from [facility] for respiratory distress. Admitted to SDU for severe sepsis and acute hypoxemic respiratory failure likely secondary to recurrent CAP.     # Acute hypoxemic respiratory failure , suspected aspiration pna,  # Severe sepsis with septic shock (WBC 12.25, Tmax 104.4F, 84% RA; hypotensive initially SBP 70s + source)  # Chronic hypotension   # Metabolic encephalopathy  - CXR: possible basilar opacities  - CT chest showing b/l opacities  - covid/RSV, MSRA, negative; Bl cx with contaminant   taper midodrine slowly dc florinef if bp stable   - blood cx 4/15: staph capitis  - blood cx 4/15 and 4/17: NGTD  - danna and vanc  - Scopolamine for secretions, aspiration precautions   - fungi-tell improving   recall ID  mrsa nasal swab neg on 4/16    # B/l feet ulcers  - no abscess/cellulitis  - colonized with ESBL strain  - off loading to prevent pressure sores   - wound care following     # Seizure disorder  # Down syndrome  # Nonverbal bedbound  # PEG tube   - c/w home meds, on keppra    #Progress Note Handoff    Pending : worsening leukocytosis and still febrile   Disposition: group home when stable   code status: dni, dnr .

## 2024-04-24 NOTE — PROGRESS NOTE ADULT - SUBJECTIVE AND OBJECTIVE BOX
24H events:    Patient is a 58y old Female who presents with a chief complaint of Pneumonia (23 Apr 2024 14:53)    Primary diagnosis of Acute sepsis    Today is hospital day 9d. This morning patient was seen and examined at bedside, resting in bed.    Patient was febrile and tachycardic overnight. Hemodynamically stable, tolerating diet, voiding appropriately with appropriate bowel movements.       Code Status: DNR/DNI      PAST MEDICAL & SURGICAL HISTORY  Down syndrome    Osteoporosis    Mild anemia    Neuropathy    S/P debridement  of R hip on 3/2/21      SOCIAL HISTORY:  Social History:      ALLERGIES:  No Known Allergies    MEDICATIONS:  STANDING MEDICATIONS  albuterol/ipratropium for Nebulization 3 milliLiter(s) Nebulizer every 6 hours  AQUAPHOR (petrolatum Ointment) 1 Application(s) Topical two times a day  artificial  tears Solution 1 Drop(s) Both EYES two times a day  calcium carbonate   1250 mG (OsCal) 1 Tablet(s) Oral daily  chlorhexidine 2% Cloths 1 Application(s) Topical daily  enoxaparin Injectable 30 milliGRAM(s) SubCutaneous every 24 hours  famotidine    Tablet 20 milliGRAM(s) Oral two times a day  gabapentin Solution 300 milliGRAM(s) Oral two times a day  hydrocortisone 1% Cream 1 Application(s) Topical daily  lactated ringers Bolus 500 milliLiter(s) IV Bolus once  lactated ringers Bolus 500 milliLiter(s) IV Bolus once  levETIRAcetam  Solution 750 milliGRAM(s) Oral two times a day  meropenem  IVPB 1000 milliGRAM(s) IV Intermittent every 8 hours  midodrine 5 milliGRAM(s) Oral every 8 hours  raloxifene 60 milliGRAM(s) Oral daily  scopolamine 1 mG/72 Hr(s) Patch 1 Patch Transdermal every 72 hours  senna 2 Tablet(s) Oral at bedtime  silver sulfADIAZINE 1% Cream 1 Application(s) Topical two times a day    PRN MEDICATIONS  acetaminophen     Tablet .. 650 milliGRAM(s) Oral every 6 hours PRN  aluminum hydroxide/magnesium hydroxide/simethicone Suspension 30 milliLiter(s) Oral every 4 hours PRN  melatonin 3 milliGRAM(s) Oral at bedtime PRN  ondansetron Injectable 4 milliGRAM(s) IV Push every 8 hours PRN        ROS:  unable to obtain due to patient status    VITALS:   T(F): 99.3  HR: 132  BP: 107/59  RR: 20  SpO2: 96%    PHYSICAL EXAM:  GENERAL: NAD, lying in bed comfortably obtunded  HEAD: Normocephalic  HEART: Regular rate and rhythm, normal S1/S2, no murmurs, heaves, thrills  LUNGS: Rhonchi and wheezing b/l, no acute respiratory distress  ABDOMEN:  soft, nontender, nondistended, + BS  EXTREMITIES: pedal ulcers, no rashes, extremities warm/dry, no cyanosis, no edema,   NERVOUS SYSTEM:  A&Ox1  SKIN: No rashes or lesions       LABS:                        9.1    23.39 )-----------( 466      ( 24 Apr 2024 05:57 )             30.1     04-24    137  |  101  |  19  ----------------------------<  188<H>  4.8   |  27  |  <0.5<L>    Ca    8.3<L>      24 Apr 2024 05:57  Mg     2.2     04-24        Urinalysis Basic - ( 24 Apr 2024 05:57 )    Color: x / Appearance: x / SG: x / pH: x  Gluc: 188 mg/dL / Ketone: x  / Bili: x / Urobili: x   Blood: x / Protein: x / Nitrite: x   Leuk Esterase: x / RBC: x / WBC x   Sq Epi: x / Non Sq Epi: x / Bacteria: x        Lactate, Blood: 1.2 mmol/L (04-24-24 @ 05:57)  Lactate, Blood: 1.3 mmol/L (04-23-24 @ 21:38)  Lactate, Blood: 1.9 mmol/L (04-23-24 @ 15:26)          RADIOLOGY:    RADIOLOGY

## 2024-04-25 ENCOUNTER — APPOINTMENT (OUTPATIENT)
Dept: BURN CARE | Facility: CLINIC | Age: 59
End: 2024-04-25

## 2024-04-25 LAB
ANION GAP SERPL CALC-SCNC: 16 MMOL/L — HIGH (ref 7–14)
BASOPHILS # BLD AUTO: 0.04 K/UL — SIGNIFICANT CHANGE UP (ref 0–0.2)
BASOPHILS NFR BLD AUTO: 0.3 % — SIGNIFICANT CHANGE UP (ref 0–1)
BUN SERPL-MCNC: 10 MG/DL — SIGNIFICANT CHANGE UP (ref 10–20)
CALCIUM SERPL-MCNC: 8.4 MG/DL — SIGNIFICANT CHANGE UP (ref 8.4–10.4)
CHLORIDE SERPL-SCNC: 99 MMOL/L — SIGNIFICANT CHANGE UP (ref 98–110)
CO2 SERPL-SCNC: 21 MMOL/L — SIGNIFICANT CHANGE UP (ref 17–32)
CREAT SERPL-MCNC: <0.5 MG/DL — LOW (ref 0.7–1.5)
EGFR: 115 ML/MIN/1.73M2 — SIGNIFICANT CHANGE UP
EOSINOPHIL # BLD AUTO: 0.16 K/UL — SIGNIFICANT CHANGE UP (ref 0–0.7)
EOSINOPHIL NFR BLD AUTO: 1.4 % — SIGNIFICANT CHANGE UP (ref 0–8)
GLUCOSE BLDC GLUCOMTR-MCNC: 107 MG/DL — HIGH (ref 70–99)
GLUCOSE BLDC GLUCOMTR-MCNC: 117 MG/DL — HIGH (ref 70–99)
GLUCOSE BLDC GLUCOMTR-MCNC: 96 MG/DL — SIGNIFICANT CHANGE UP (ref 70–99)
GLUCOSE SERPL-MCNC: 84 MG/DL — SIGNIFICANT CHANGE UP (ref 70–99)
HCT VFR BLD CALC: 25.2 % — LOW (ref 37–47)
HGB BLD-MCNC: 7.7 G/DL — LOW (ref 12–16)
IMM GRANULOCYTES NFR BLD AUTO: 0.6 % — HIGH (ref 0.1–0.3)
LYMPHOCYTES # BLD AUTO: 1.43 K/UL — SIGNIFICANT CHANGE UP (ref 1.2–3.4)
LYMPHOCYTES # BLD AUTO: 12.2 % — LOW (ref 20.5–51.1)
MAGNESIUM SERPL-MCNC: 2.3 MG/DL — SIGNIFICANT CHANGE UP (ref 1.8–2.4)
MCHC RBC-ENTMCNC: 28.5 PG — SIGNIFICANT CHANGE UP (ref 27–31)
MCHC RBC-ENTMCNC: 30.6 G/DL — LOW (ref 32–37)
MCV RBC AUTO: 93.3 FL — SIGNIFICANT CHANGE UP (ref 81–99)
MONOCYTES # BLD AUTO: 0.6 K/UL — SIGNIFICANT CHANGE UP (ref 0.1–0.6)
MONOCYTES NFR BLD AUTO: 5.1 % — SIGNIFICANT CHANGE UP (ref 1.7–9.3)
NEUTROPHILS # BLD AUTO: 9.41 K/UL — HIGH (ref 1.4–6.5)
NEUTROPHILS NFR BLD AUTO: 80.4 % — HIGH (ref 42.2–75.2)
NRBC # BLD: 0 /100 WBCS — SIGNIFICANT CHANGE UP (ref 0–0)
PLATELET # BLD AUTO: 415 K/UL — HIGH (ref 130–400)
PMV BLD: 10.4 FL — SIGNIFICANT CHANGE UP (ref 7.4–10.4)
POTASSIUM SERPL-MCNC: 4.7 MMOL/L — SIGNIFICANT CHANGE UP (ref 3.5–5)
POTASSIUM SERPL-SCNC: 4.7 MMOL/L — SIGNIFICANT CHANGE UP (ref 3.5–5)
RBC # BLD: 2.7 M/UL — LOW (ref 4.2–5.4)
RBC # FLD: 23.3 % — HIGH (ref 11.5–14.5)
SODIUM SERPL-SCNC: 136 MMOL/L — SIGNIFICANT CHANGE UP (ref 135–146)
WBC # BLD: 11.71 K/UL — HIGH (ref 4.8–10.8)
WBC # FLD AUTO: 11.71 K/UL — HIGH (ref 4.8–10.8)

## 2024-04-25 PROCEDURE — 99233 SBSQ HOSP IP/OBS HIGH 50: CPT

## 2024-04-25 RX ADMIN — Medication 1 APPLICATION(S): at 05:24

## 2024-04-25 RX ADMIN — CHLORHEXIDINE GLUCONATE 1 APPLICATION(S): 213 SOLUTION TOPICAL at 12:41

## 2024-04-25 RX ADMIN — MEROPENEM 100 MILLIGRAM(S): 1 INJECTION INTRAVENOUS at 22:21

## 2024-04-25 RX ADMIN — GABAPENTIN 300 MILLIGRAM(S): 400 CAPSULE ORAL at 18:56

## 2024-04-25 RX ADMIN — Medication 3 MILLILITER(S): at 20:07

## 2024-04-25 RX ADMIN — MEROPENEM 100 MILLIGRAM(S): 1 INJECTION INTRAVENOUS at 05:22

## 2024-04-25 RX ADMIN — MIDODRINE HYDROCHLORIDE 5 MILLIGRAM(S): 2.5 TABLET ORAL at 05:24

## 2024-04-25 RX ADMIN — Medication 1 DROP(S): at 18:57

## 2024-04-25 RX ADMIN — MEROPENEM 100 MILLIGRAM(S): 1 INJECTION INTRAVENOUS at 13:40

## 2024-04-25 RX ADMIN — LEVETIRACETAM 750 MILLIGRAM(S): 250 TABLET, FILM COATED ORAL at 05:23

## 2024-04-25 RX ADMIN — RALOXIFENE HYDROCHLORIDE 60 MILLIGRAM(S): 60 TABLET, COATED ORAL at 12:20

## 2024-04-25 RX ADMIN — Medication 1 APPLICATION(S): at 05:25

## 2024-04-25 RX ADMIN — FAMOTIDINE 20 MILLIGRAM(S): 10 INJECTION INTRAVENOUS at 05:23

## 2024-04-25 RX ADMIN — Medication 1 APPLICATION(S): at 18:57

## 2024-04-25 RX ADMIN — Medication 3 MILLILITER(S): at 13:30

## 2024-04-25 RX ADMIN — FAMOTIDINE 20 MILLIGRAM(S): 10 INJECTION INTRAVENOUS at 18:57

## 2024-04-25 RX ADMIN — Medication 1 TABLET(S): at 12:20

## 2024-04-25 RX ADMIN — Medication 1 APPLICATION(S): at 20:32

## 2024-04-25 RX ADMIN — MIDODRINE HYDROCHLORIDE 5 MILLIGRAM(S): 2.5 TABLET ORAL at 13:40

## 2024-04-25 RX ADMIN — MIDODRINE HYDROCHLORIDE 5 MILLIGRAM(S): 2.5 TABLET ORAL at 22:21

## 2024-04-25 RX ADMIN — Medication 1 DROP(S): at 05:22

## 2024-04-25 RX ADMIN — Medication 1 APPLICATION(S): at 12:41

## 2024-04-25 RX ADMIN — GABAPENTIN 300 MILLIGRAM(S): 400 CAPSULE ORAL at 05:23

## 2024-04-25 RX ADMIN — Medication 3 MILLILITER(S): at 08:33

## 2024-04-25 RX ADMIN — LEVETIRACETAM 750 MILLIGRAM(S): 250 TABLET, FILM COATED ORAL at 18:56

## 2024-04-25 RX ADMIN — ENOXAPARIN SODIUM 30 MILLIGRAM(S): 100 INJECTION SUBCUTANEOUS at 13:40

## 2024-04-25 RX ADMIN — Medication 650 MILLIGRAM(S): at 05:23

## 2024-04-25 NOTE — PROGRESS NOTE ADULT - SUBJECTIVE AND OBJECTIVE BOX
SUBJECTIVE:    Patient is a 58y old Female who presents with a chief complaint of Pneumonia (25 Apr 2024 16:11)    Currently admitted to medicine with the primary diagnosis of:    Today is hospital day 10d.     Overnight Events:     Patient remains febrile and tachycardic. requiring less o2     PAST MEDICAL & SURGICAL HISTORY  Down syndrome    Osteoporosis    Mild anemia    Neuropathy    S/P debridement  of R hip on 3/2/21        SOCIAL HISTORY:  Smoking history:  Alcohol Use;  Illicit Drug Use:    ALLERGIES:  No Known Allergies    MEDICATIONS:  STANDING MEDICATIONS  albuterol/ipratropium for Nebulization 3 milliLiter(s) Nebulizer every 6 hours  AQUAPHOR (petrolatum Ointment) 1 Application(s) Topical two times a day  artificial  tears Solution 1 Drop(s) Both EYES two times a day  calcium carbonate   1250 mG (OsCal) 1 Tablet(s) Oral daily  chlorhexidine 2% Cloths 1 Application(s) Topical daily  enoxaparin Injectable 30 milliGRAM(s) SubCutaneous every 24 hours  famotidine    Tablet 20 milliGRAM(s) Oral two times a day  gabapentin Solution 300 milliGRAM(s) Oral two times a day  hydrocortisone 1% Cream 1 Application(s) Topical daily  levETIRAcetam  Solution 750 milliGRAM(s) Oral two times a day  meropenem  IVPB 1000 milliGRAM(s) IV Intermittent every 8 hours  midodrine 5 milliGRAM(s) Oral every 8 hours  raloxifene 60 milliGRAM(s) Oral daily  scopolamine 1 mG/72 Hr(s) Patch 1 Patch Transdermal every 72 hours  senna 2 Tablet(s) Oral at bedtime  silver sulfADIAZINE 1% Cream 1 Application(s) Topical two times a day    PRN MEDICATIONS  acetaminophen     Tablet .. 650 milliGRAM(s) Oral every 6 hours PRN  aluminum hydroxide/magnesium hydroxide/simethicone Suspension 30 milliLiter(s) Oral every 4 hours PRN  melatonin 3 milliGRAM(s) Oral at bedtime PRN  ondansetron Injectable 4 milliGRAM(s) IV Push every 8 hours PRN    VITALS:   ICU Vital Signs Last 24 Hrs  T(C): 37 (25 Apr 2024 14:26), Max: 38.1 (25 Apr 2024 04:43)  T(F): 98.6 (25 Apr 2024 14:26), Max: 100.6 (25 Apr 2024 04:43)  HR: 113 (25 Apr 2024 14:26) (60 - 115)  BP: 107/59 (25 Apr 2024 14:26) (104/53 - 127/56)  RR: 19 (25 Apr 2024 14:26) (19 - 20)  SpO2: 93% (25 Apr 2024 04:43) (93% - 96%)    O2 Parameters below as of 25 Apr 2024 04:43  Patient On (Oxygen Delivery Method): nasal cannula    LABS:                        7.7    11.71 )-----------( 415      ( 25 Apr 2024 07:21 )             25.2     04-25    136  |  99  |  10  ----------------------------<  84  4.7   |  21  |  <0.5<L>    Ca    8.4      25 Apr 2024 07:21  Mg     2.3     04-25        Urinalysis Basic - ( 25 Apr 2024 07:21 )    Color: x / Appearance: x / SG: x / pH: x  Gluc: 84 mg/dL / Ketone: x  / Bili: x / Urobili: x   Blood: x / Protein: x / Nitrite: x   Leuk Esterase: x / RBC: x / WBC x   Sq Epi: x / Non Sq Epi: x / Bacteria: x            Culture - Blood (collected 23 Apr 2024 15:26)  Source: .Blood Blood  Preliminary Report (24 Apr 2024 20:01):    No growth at 24 hours    RADIOLOGY:    < from: CT Angio Chest PE Protocol w/ IV Cont (04.24.24 @ 13:15) >  IMPRESSION:    No pulmonary embolus seen within the limitations of this exam.    Bilateral lower lobar consolidations and additional multifocal bilateral   multilobar patchy opacities. These findings are most likely infectious.   Correlate for pneumonia. Follow-up in 3 months recommended.    Bilateral small pleural effusions.    Large hiatal hernia.    --- End of Report ---    < end of copied text >      PHYSICAL EXAM:    GENERAL: ill appearing   CHEST/LUNG: b/l rhonci  HEART: tachy; No murmurs, rubs, or gallops  ABDOMEN: Bowel sounds present; Soft, Nontender, Nondistended.    EXTREMITIES:  2+ Peripheral Pulses, brisk capillary refill. No clubbing, cyanosis, or edema  NERVOUS SYSTEM:  Alert & Oriented X0  Skin: b/l foot ulcer dry

## 2024-04-25 NOTE — PROGRESS NOTE ADULT - SUBJECTIVE AND OBJECTIVE BOX
JERICHOTEA  58y, Female    All available historical data reviewed    OVERNIGHT EVENTS:  low grade fevers  HF    ROS:  unable to obtain history secondary to patient's mental status     VITALS:  T(F): 98.6, Max: 100.6 (04-25-24 @ 04:43)  HR: 113  BP: 107/59  RR: 19Vital Signs Last 24 Hrs  T(C): 37 (25 Apr 2024 14:26), Max: 38.1 (25 Apr 2024 04:43)  T(F): 98.6 (25 Apr 2024 14:26), Max: 100.6 (25 Apr 2024 04:43)  HR: 113 (25 Apr 2024 14:26) (60 - 115)  BP: 107/59 (25 Apr 2024 14:26) (104/53 - 127/56)  BP(mean): --  RR: 19 (25 Apr 2024 14:26) (19 - 20)  SpO2: 93% (25 Apr 2024 04:43) (93% - 96%)    Parameters below as of 25 Apr 2024 04:43  Patient On (Oxygen Delivery Method): nasal cannula        TESTS & MEASUREMENTS:                        7.7    11.71 )-----------( 415      ( 25 Apr 2024 07:21 )             25.2     04-25    136  |  99  |  10  ----------------------------<  84  4.7   |  21  |  <0.5<L>    Ca    8.4      25 Apr 2024 07:21  Mg     2.3     04-25          Culture - Blood (collected 04-23-24 @ 15:26)  Source: .Blood Blood  Preliminary Report (04-24-24 @ 20:01):    No growth at 24 hours    Culture - Blood (collected 04-21-24 @ 06:07)  Source: .Blood None  Preliminary Report (04-25-24 @ 16:01):    No growth at 4 days    Culture - Blood (collected 04-19-24 @ 04:27)  Source: .Blood Blood  Final Report (04-24-24 @ 10:00):    No growth at 5 days      Urinalysis Basic - ( 25 Apr 2024 07:21 )    Color: x / Appearance: x / SG: x / pH: x  Gluc: 84 mg/dL / Ketone: x  / Bili: x / Urobili: x   Blood: x / Protein: x / Nitrite: x   Leuk Esterase: x / RBC: x / WBC x   Sq Epi: x / Non Sq Epi: x / Bacteria: x          RADIOLOGY & ADDITIONAL TESTS:  Personal review of radiological diagnostics performed  Echo and EKG results noted when applicable.     MEDICATIONS:  acetaminophen     Tablet .. 650 milliGRAM(s) Oral every 6 hours PRN  albuterol/ipratropium for Nebulization 3 milliLiter(s) Nebulizer every 6 hours  aluminum hydroxide/magnesium hydroxide/simethicone Suspension 30 milliLiter(s) Oral every 4 hours PRN  AQUAPHOR (petrolatum Ointment) 1 Application(s) Topical two times a day  artificial  tears Solution 1 Drop(s) Both EYES two times a day  calcium carbonate   1250 mG (OsCal) 1 Tablet(s) Oral daily  chlorhexidine 2% Cloths 1 Application(s) Topical daily  enoxaparin Injectable 30 milliGRAM(s) SubCutaneous every 24 hours  famotidine    Tablet 20 milliGRAM(s) Oral two times a day  gabapentin Solution 300 milliGRAM(s) Oral two times a day  hydrocortisone 1% Cream 1 Application(s) Topical daily  lactated ringers Bolus 500 milliLiter(s) IV Bolus once  levETIRAcetam  Solution 750 milliGRAM(s) Oral two times a day  melatonin 3 milliGRAM(s) Oral at bedtime PRN  meropenem  IVPB 1000 milliGRAM(s) IV Intermittent every 8 hours  midodrine 5 milliGRAM(s) Oral every 8 hours  ondansetron Injectable 4 milliGRAM(s) IV Push every 8 hours PRN  raloxifene 60 milliGRAM(s) Oral daily  scopolamine 1 mG/72 Hr(s) Patch 1 Patch Transdermal every 72 hours  senna 2 Tablet(s) Oral at bedtime  silver sulfADIAZINE 1% Cream 1 Application(s) Topical two times a day      ANTIBIOTICS:  meropenem  IVPB 1000 milliGRAM(s) IV Intermittent every 8 hours

## 2024-04-25 NOTE — PROGRESS NOTE ADULT - SUBJECTIVE AND OBJECTIVE BOX
24H events:    Patient is a 58y old Female who presents with a chief complaint of Pneumonia (24 Apr 2024 23:48)    Primary diagnosis of Acute sepsis    Today is hospital day 10d. This morning patient was seen and examined at bedside, resting comfortably in bed.    Patient was febrile overnight and received tylenol 650mg. Hemodynamically stable, NPO, voiding appropriately with appropriate bowel movements.         PAST MEDICAL & SURGICAL HISTORY  Down syndrome    Osteoporosis    Mild anemia    Neuropathy    S/P debridement  of R hip on 3/2/21      SOCIAL HISTORY:  Social History:      ALLERGIES:  No Known Allergies    MEDICATIONS:  STANDING MEDICATIONS  albuterol/ipratropium for Nebulization 3 milliLiter(s) Nebulizer every 6 hours  AQUAPHOR (petrolatum Ointment) 1 Application(s) Topical two times a day  artificial  tears Solution 1 Drop(s) Both EYES two times a day  calcium carbonate   1250 mG (OsCal) 1 Tablet(s) Oral daily  chlorhexidine 2% Cloths 1 Application(s) Topical daily  enoxaparin Injectable 30 milliGRAM(s) SubCutaneous every 24 hours  famotidine    Tablet 20 milliGRAM(s) Oral two times a day  gabapentin Solution 300 milliGRAM(s) Oral two times a day  hydrocortisone 1% Cream 1 Application(s) Topical daily  lactated ringers Bolus 500 milliLiter(s) IV Bolus once  levETIRAcetam  Solution 750 milliGRAM(s) Oral two times a day  meropenem  IVPB 1000 milliGRAM(s) IV Intermittent every 8 hours  midodrine 5 milliGRAM(s) Oral every 8 hours  raloxifene 60 milliGRAM(s) Oral daily  scopolamine 1 mG/72 Hr(s) Patch 1 Patch Transdermal every 72 hours  senna 2 Tablet(s) Oral at bedtime  silver sulfADIAZINE 1% Cream 1 Application(s) Topical two times a day    PRN MEDICATIONS  acetaminophen     Tablet .. 650 milliGRAM(s) Oral every 6 hours PRN  aluminum hydroxide/magnesium hydroxide/simethicone Suspension 30 milliLiter(s) Oral every 4 hours PRN  melatonin 3 milliGRAM(s) Oral at bedtime PRN  ondansetron Injectable 4 milliGRAM(s) IV Push every 8 hours PRN      ROS:  unable to obtain due to patient status    VITALS:   T(F): 100.6  HR: 115  BP: 104/53  RR: 20  SpO2: 93%    PHYSICAL EXAM:  GENERAL: NAD, lying in bed comfortably, lethargic, cooperative  HEAD: Normocephalic  HEART: Regular rate and rhythm, normal S1/S2, no murmurs, heaves, thrills  LUNGS: No acute respiratory distress, rhonchi heard  ABDOMEN:  soft, nontender, nondistended, + BS  EXTREMITIES: ulcerations on LE, no rashes, extremities warm/dry, no cyanosis, no edema or ecchymosis  NERVOUS SYSTEM:  A&Ox0  SKIN: pedal ulcers wrapped           LABS:                        7.7    11.71 )-----------( 415      ( 25 Apr 2024 07:21 )             25.2     04-25    136  |  99  |  10  ----------------------------<  84  4.7   |  21  |  <0.5<L>    Ca    8.4      25 Apr 2024 07:21  Mg     2.3     04-25        Urinalysis Basic - ( 25 Apr 2024 07:21 )    Color: x / Appearance: x / SG: x / pH: x  Gluc: 84 mg/dL / Ketone: x  / Bili: x / Urobili: x   Blood: x / Protein: x / Nitrite: x   Leuk Esterase: x / RBC: x / WBC x   Sq Epi: x / Non Sq Epi: x / Bacteria: x            Culture - Blood (collected 23 Apr 2024 15:26)  Source: .Blood Blood  Preliminary Report (24 Apr 2024 20:01):    No growth at 24 hours          RADIOLOGY:    RADIOLOGY

## 2024-04-25 NOTE — PROGRESS NOTE ADULT - ASSESSMENT
57yo F w/ pmhx of Down's syndrome, cerebral palsy, seizure disorder presents from [facility] for respiratory distress. Admitted to SDU for severe sepsis and acute hypoxemic respiratory failure likely secondary to recurrent CAP.     #acute hypoxemic respiratory failure   #Severe sepsis with septic shock  #possible recurrent CAP   #chronic hypotension   #metabolic encephalopathy  - CXR: possible basilar opacities  - covid/RSV, MSRA, negative; Bl cx with contaminant   - C/w Midodrine 5mg  - target MAP 60 ( Patient always has Bp on the softer side )   - blood cx 4/15: staph capitis  - blood cx 4/15 and 4/17: NGTD  - Scopolamine for secretions. aspiration precautions   - Palliative care following  - completed course of Meropenem   - 4/22 and 4/23: spiked fever and WBC ; 4/24 and 4/25 febrile but WBC downtrending (12 on 4/25)  - fluid bolus given  - restarted Alicia per ID -> f/u ID rec for abx coverage  - EKG 4/24 sinus tachy QTc 420, unremarkable otherwise  - F/u bcx 4/23 -> no growth as of 4/25  - CT chest angio w/ contrast to rule out PE -> no PE on CTA but bilateral opacities seen   - currently on 4L nc  -f/u sputum culture    #Episodes of Vomiting:   - On PEG feed   - Aspiration risk ; C Xray done   - Can restart the PEG feeding for now     # B/l feet ulcers  - No abscess/cellulitis  - colonized with ESBL strain  - Off loading to prevent pressure sores   - wound care following     #seizure disorder  #Down syndrome  #Nonverbal bedbound  # PEG tube   - c/w home meds

## 2024-04-25 NOTE — PROGRESS NOTE ADULT - ASSESSMENT
· Assessment	  58 MARÍA w a PMH of Trisomy 21, a recent admission [id number] for RSV PNA w MICU stay (never intubated), nonverbal at baseline, hypotension, cerebral palsey w seizure disorder, ESBL isolated from ulcer of rt foot 11/23 BIBEMS from [facility] for respiratory distress and high fever. Patient SOA w , RR 22 and febrile to 102.8 rectally. Patient also hypotensive to 82/51. Labs notable for leukocytosis of 12.25 w 3.6% bands, large platelets and a compensated Respiratory acidosis. Imaging notable for a Rt Mid lobe ground glass opacification. Patient MRSA Negative w RVP negative for COVID, RSV and Flu.     IMPRESSION/RECOMMENDATIONS  4/24 CT no PE, multifocal opacities  Multifocal suspected GNR PNA  WBC 11.7  4/17,19,21, 23 BCx NG  Immunosuppression could result in poor clinical outcome.   Nares ORSA NG  RVP NG  4/15 Bcx 1/2 CoNS > not a true pathogen  Was colonized with ESBL strain in feet wounds  Nares ORSA NG  COVID 19/ Influenza/ RSV NG.   WBC 16.6  Feet with pressure related ulcers. No abscess/cellulitis    -serum fungitell assay  -Sputum gm stain, cultures  -Off loading to prevent pressure sores and preventive measures to avoid aspiration   -Meropenem 1 gm iv q8h

## 2024-04-25 NOTE — PROGRESS NOTE ADULT - ASSESSMENT
57yo F with PMH of Down's syndrome, cerebral palsy, seizure disorder presents from [facility] for respiratory distress. Admitted to SDU for severe sepsis and acute hypoxemic respiratory failure likely secondary to recurrent CAP.     # Acute hypoxemic respiratory failure due to multifocal pneumonia   # Severe sepsis with septic shock   # Chronic hypotension   # B/l feet ulcers  # Seizure disorder  # Down syndrome  # Nonverbal bedbound  # PEG tube       Plan   CTA - no pe and multifocal pneumonia   ekg sinus tach   Mrsa neg  Continue meropenam  continue midodrine   off loading to prevent pressure sores   send fungitell   monitor hgb , wbc   Discussed with ID   #Progress Note Handoff    Pending : clinical status improvement still on iv abx   Disposition: group home when stable   code status: dni, dnr .

## 2024-04-26 LAB
ANION GAP SERPL CALC-SCNC: 10 MMOL/L — SIGNIFICANT CHANGE UP (ref 7–14)
BASOPHILS # BLD AUTO: 0.07 K/UL — SIGNIFICANT CHANGE UP (ref 0–0.2)
BASOPHILS NFR BLD AUTO: 0.7 % — SIGNIFICANT CHANGE UP (ref 0–1)
BUN SERPL-MCNC: 13 MG/DL — SIGNIFICANT CHANGE UP (ref 10–20)
CALCIUM SERPL-MCNC: 8.7 MG/DL — SIGNIFICANT CHANGE UP (ref 8.4–10.5)
CHLORIDE SERPL-SCNC: 99 MMOL/L — SIGNIFICANT CHANGE UP (ref 98–110)
CO2 SERPL-SCNC: 31 MMOL/L — SIGNIFICANT CHANGE UP (ref 17–32)
CREAT SERPL-MCNC: <0.5 MG/DL — LOW (ref 0.7–1.5)
CULTURE RESULTS: SIGNIFICANT CHANGE UP
EGFR: 115 ML/MIN/1.73M2 — SIGNIFICANT CHANGE UP
EOSINOPHIL # BLD AUTO: 0.14 K/UL — SIGNIFICANT CHANGE UP (ref 0–0.7)
EOSINOPHIL NFR BLD AUTO: 1.4 % — SIGNIFICANT CHANGE UP (ref 0–8)
FUNGITELL: 53 PG/ML — SIGNIFICANT CHANGE UP
GLUCOSE SERPL-MCNC: 101 MG/DL — HIGH (ref 70–99)
HCT VFR BLD CALC: 28.8 % — LOW (ref 37–47)
HGB BLD-MCNC: 8.5 G/DL — LOW (ref 12–16)
IMM GRANULOCYTES NFR BLD AUTO: 0.7 % — HIGH (ref 0.1–0.3)
LYMPHOCYTES # BLD AUTO: 1.42 K/UL — SIGNIFICANT CHANGE UP (ref 1.2–3.4)
LYMPHOCYTES # BLD AUTO: 13.9 % — LOW (ref 20.5–51.1)
MAGNESIUM SERPL-MCNC: 2.4 MG/DL — SIGNIFICANT CHANGE UP (ref 1.8–2.4)
MCHC RBC-ENTMCNC: 27.7 PG — SIGNIFICANT CHANGE UP (ref 27–31)
MCHC RBC-ENTMCNC: 29.5 G/DL — LOW (ref 32–37)
MCV RBC AUTO: 93.8 FL — SIGNIFICANT CHANGE UP (ref 81–99)
MONOCYTES # BLD AUTO: 0.5 K/UL — SIGNIFICANT CHANGE UP (ref 0.1–0.6)
MONOCYTES NFR BLD AUTO: 4.9 % — SIGNIFICANT CHANGE UP (ref 1.7–9.3)
NEUTROPHILS # BLD AUTO: 7.99 K/UL — HIGH (ref 1.4–6.5)
NEUTROPHILS NFR BLD AUTO: 78.4 % — HIGH (ref 42.2–75.2)
NRBC # BLD: 0 /100 WBCS — SIGNIFICANT CHANGE UP (ref 0–0)
PLATELET # BLD AUTO: 588 K/UL — HIGH (ref 130–400)
PMV BLD: 10.3 FL — SIGNIFICANT CHANGE UP (ref 7.4–10.4)
POTASSIUM SERPL-MCNC: 4.6 MMOL/L — SIGNIFICANT CHANGE UP (ref 3.5–5)
POTASSIUM SERPL-SCNC: 4.6 MMOL/L — SIGNIFICANT CHANGE UP (ref 3.5–5)
RBC # BLD: 3.07 M/UL — LOW (ref 4.2–5.4)
RBC # FLD: 22.6 % — HIGH (ref 11.5–14.5)
SODIUM SERPL-SCNC: 140 MMOL/L — SIGNIFICANT CHANGE UP (ref 135–146)
SPECIMEN SOURCE: SIGNIFICANT CHANGE UP
WBC # BLD: 10.19 K/UL — SIGNIFICANT CHANGE UP (ref 4.8–10.8)
WBC # FLD AUTO: 10.19 K/UL — SIGNIFICANT CHANGE UP (ref 4.8–10.8)

## 2024-04-26 PROCEDURE — 99233 SBSQ HOSP IP/OBS HIGH 50: CPT

## 2024-04-26 PROCEDURE — 71045 X-RAY EXAM CHEST 1 VIEW: CPT | Mod: 26

## 2024-04-26 RX ADMIN — MEROPENEM 100 MILLIGRAM(S): 1 INJECTION INTRAVENOUS at 21:41

## 2024-04-26 RX ADMIN — Medication 1 APPLICATION(S): at 18:11

## 2024-04-26 RX ADMIN — MIDODRINE HYDROCHLORIDE 5 MILLIGRAM(S): 2.5 TABLET ORAL at 21:41

## 2024-04-26 RX ADMIN — Medication 650 MILLIGRAM(S): at 07:00

## 2024-04-26 RX ADMIN — FAMOTIDINE 20 MILLIGRAM(S): 10 INJECTION INTRAVENOUS at 06:13

## 2024-04-26 RX ADMIN — LEVETIRACETAM 750 MILLIGRAM(S): 250 TABLET, FILM COATED ORAL at 17:56

## 2024-04-26 RX ADMIN — FAMOTIDINE 20 MILLIGRAM(S): 10 INJECTION INTRAVENOUS at 17:56

## 2024-04-26 RX ADMIN — Medication 1 TABLET(S): at 15:43

## 2024-04-26 RX ADMIN — MEROPENEM 100 MILLIGRAM(S): 1 INJECTION INTRAVENOUS at 06:13

## 2024-04-26 RX ADMIN — SENNA PLUS 2 TABLET(S): 8.6 TABLET ORAL at 21:54

## 2024-04-26 RX ADMIN — Medication 3 MILLILITER(S): at 07:36

## 2024-04-26 RX ADMIN — ENOXAPARIN SODIUM 30 MILLIGRAM(S): 100 INJECTION SUBCUTANEOUS at 15:45

## 2024-04-26 RX ADMIN — MIDODRINE HYDROCHLORIDE 5 MILLIGRAM(S): 2.5 TABLET ORAL at 06:13

## 2024-04-26 RX ADMIN — Medication 650 MILLIGRAM(S): at 06:25

## 2024-04-26 RX ADMIN — GABAPENTIN 300 MILLIGRAM(S): 400 CAPSULE ORAL at 21:41

## 2024-04-26 RX ADMIN — Medication 1 DROP(S): at 18:12

## 2024-04-26 RX ADMIN — MIDODRINE HYDROCHLORIDE 5 MILLIGRAM(S): 2.5 TABLET ORAL at 15:45

## 2024-04-26 RX ADMIN — Medication 1 APPLICATION(S): at 06:14

## 2024-04-26 RX ADMIN — RALOXIFENE HYDROCHLORIDE 60 MILLIGRAM(S): 60 TABLET, COATED ORAL at 15:45

## 2024-04-26 RX ADMIN — Medication 1 DROP(S): at 06:13

## 2024-04-26 RX ADMIN — CHLORHEXIDINE GLUCONATE 1 APPLICATION(S): 213 SOLUTION TOPICAL at 12:43

## 2024-04-26 RX ADMIN — GABAPENTIN 300 MILLIGRAM(S): 400 CAPSULE ORAL at 06:12

## 2024-04-26 RX ADMIN — LEVETIRACETAM 750 MILLIGRAM(S): 250 TABLET, FILM COATED ORAL at 06:13

## 2024-04-26 RX ADMIN — Medication 3 MILLILITER(S): at 13:18

## 2024-04-26 RX ADMIN — MEROPENEM 100 MILLIGRAM(S): 1 INJECTION INTRAVENOUS at 14:45

## 2024-04-26 RX ADMIN — Medication 1 APPLICATION(S): at 12:44

## 2024-04-26 NOTE — PROGRESS NOTE ADULT - SUBJECTIVE AND OBJECTIVE BOX
24H events:    Patient is a 58y old Female who presents with a chief complaint of Pneumonia (25 Apr 2024 17:11)    Primary diagnosis of Acute sepsis     Today is hospital day 11d. This morning patient was seen and examined at bedside, resting comfortably in bed.    Patient was mildly febrile overnight. Hemodynamically stable, tolerating diet, voiding appropriately with appropriate bowel movements.       PAST MEDICAL & SURGICAL HISTORY  Down syndrome    Osteoporosis    Mild anemia    Neuropathy    S/P debridement  of R hip on 3/2/21      SOCIAL HISTORY:  Social History:      ALLERGIES:  No Known Allergies    MEDICATIONS:  STANDING MEDICATIONS  albuterol/ipratropium for Nebulization 3 milliLiter(s) Nebulizer every 6 hours  AQUAPHOR (petrolatum Ointment) 1 Application(s) Topical two times a day  artificial  tears Solution 1 Drop(s) Both EYES two times a day  calcium carbonate   1250 mG (OsCal) 1 Tablet(s) Oral daily  chlorhexidine 2% Cloths 1 Application(s) Topical daily  enoxaparin Injectable 30 milliGRAM(s) SubCutaneous every 24 hours  famotidine    Tablet 20 milliGRAM(s) Oral two times a day  gabapentin Solution 300 milliGRAM(s) Oral two times a day  hydrocortisone 1% Cream 1 Application(s) Topical daily  levETIRAcetam  Solution 750 milliGRAM(s) Oral two times a day  meropenem  IVPB 1000 milliGRAM(s) IV Intermittent every 8 hours  midodrine 5 milliGRAM(s) Oral every 8 hours  raloxifene 60 milliGRAM(s) Oral daily  scopolamine 1 mG/72 Hr(s) Patch 1 Patch Transdermal every 72 hours  senna 2 Tablet(s) Oral at bedtime  silver sulfADIAZINE 1% Cream 1 Application(s) Topical two times a day    PRN MEDICATIONS  acetaminophen     Tablet .. 650 milliGRAM(s) Oral every 6 hours PRN  aluminum hydroxide/magnesium hydroxide/simethicone Suspension 30 milliLiter(s) Oral every 4 hours PRN  melatonin 3 milliGRAM(s) Oral at bedtime PRN  ondansetron Injectable 4 milliGRAM(s) IV Push every 8 hours PRN        ROS:  unable to obtain due to patient status    VITALS:   T(F): 100.6  HR: 107  BP: 102/60  RR: 19  SpO2: 95%    PHYSICAL EXAM:  GENERAL: NAD, lying in bed comfortably, obtunded, lethargic,  somnolent, cooperative, elderly  HEAD: Normocephalic  HEART: Regular rate and rhythm, normal S1/S2, no murmurs, heaves, thrills  LUNGS: No acute respiratory distress, rhonchi on exam  ABDOMEN:  soft, nontender, nondistended, + BS  EXTREMITIES: no rashes, extremities warm/dry, no cyanosis, no edema, ulcerations or ecchymosis  NERVOUS SYSTEM:  A&Ox0, follows commands  SKIN: No rashes or lesions                 LABS:                        8.5    10.19 )-----------( 588      ( 26 Apr 2024 06:35 )             28.8     04-26    140  |  99  |  13  ----------------------------<  101<H>  4.6   |  31  |  <0.5<L>    Ca    8.7      26 Apr 2024 06:35  Mg     2.4     04-26        Urinalysis Basic - ( 26 Apr 2024 06:35 )    Color: x / Appearance: x / SG: x / pH: x  Gluc: 101 mg/dL / Ketone: x  / Bili: x / Urobili: x   Blood: x / Protein: x / Nitrite: x   Leuk Esterase: x / RBC: x / WBC x   Sq Epi: x / Non Sq Epi: x / Bacteria: x            Culture - Blood (collected 23 Apr 2024 15:26)  Source: .Blood Blood  Preliminary Report (25 Apr 2024 20:01):    No growth at 48 Hours

## 2024-04-26 NOTE — PROGRESS NOTE ADULT - ASSESSMENT
59yo F with PMH of Down's syndrome, cerebral palsy, seizure disorder presents from [facility] for respiratory distress. Admitted to SDU for severe sepsis and acute hypoxemic respiratory failure likely secondary to recurrent CAP.     # Acute hypoxemic respiratory failure due to multifocal pneumonia   # Severe sepsis with septic shock   # Chronic hypotension   # B/l feet ulcers  # Seizure disorder  # Down syndrome  # Nonverbal bedbound  # PEG tube       Plan   CTA - no pe and multifocal pneumonia   cxr today improving   ekg sinus tach   Mrsa neg  Continue meropenam  continue midodrine   off loading to prevent pressure sores   send fungitell   monitor hgb , wbc   ID following     Progress Note Handoff    Pending : clinical status improvement still on iv abx , on O2 supplementation, Still febrile   Disposition: group home when stable   code status: dni, dnr .

## 2024-04-26 NOTE — PROGRESS NOTE ADULT - ASSESSMENT
59yo F w/ pmhx of Down's syndrome, cerebral palsy, seizure disorder presents from [facility] for respiratory distress. Admitted to SDU for severe sepsis and acute hypoxemic respiratory failure likely secondary to recurrent CAP.     #acute hypoxemic respiratory failure   #Severe sepsis with septic shock  #possible recurrent CAP   #chronic hypotension   #metabolic encephalopathy  - CXR: possible basilar opacities  - covid/RSV, MSRA, negative; Bl cx with contaminant   - C/w Midodrine 5mg  - target MAP 60 ( Patient always has BP on the softer side )   - blood cx 4/15: staph capitis  - blood cx 4/15 and 4/17: NGTD  - Scopolamine for secretions. aspiration precautions   - Palliative care following   - 4/22 and 4/23: spiked fever and WBC ;WBC downtrending (10 on 4/26)  - restarted Alicia per ID 4/24  - F/u bcx 4/23 -> no growth as of 4/25  - CT chest angio w/ contrast -> bilateral opacities seen   - currently on 4L w/mask    #Episodes of Vomiting:   - On PEG feed   - Aspiration risk ; C Xray done   - Can restart the PEG feeding for now     # B/l feet ulcers  - No abscess/cellulitis  - colonized with ESBL strain  - Off loading to prevent pressure sores   - wound care following     #seizure disorder  #Down syndrome  #Nonverbal bedbound  # PEG tube   - c/w home meds

## 2024-04-27 LAB
ANION GAP SERPL CALC-SCNC: 15 MMOL/L — HIGH (ref 7–14)
BASOPHILS # BLD AUTO: 0.07 K/UL — SIGNIFICANT CHANGE UP (ref 0–0.2)
BASOPHILS NFR BLD AUTO: 0.5 % — SIGNIFICANT CHANGE UP (ref 0–1)
BUN SERPL-MCNC: 17 MG/DL — SIGNIFICANT CHANGE UP (ref 10–20)
CALCIUM SERPL-MCNC: 8.8 MG/DL — SIGNIFICANT CHANGE UP (ref 8.4–10.4)
CHLORIDE SERPL-SCNC: 98 MMOL/L — SIGNIFICANT CHANGE UP (ref 98–110)
CO2 SERPL-SCNC: 26 MMOL/L — SIGNIFICANT CHANGE UP (ref 17–32)
CREAT SERPL-MCNC: 0.6 MG/DL — LOW (ref 0.7–1.5)
EGFR: 105 ML/MIN/1.73M2 — SIGNIFICANT CHANGE UP
EOSINOPHIL # BLD AUTO: 0.01 K/UL — SIGNIFICANT CHANGE UP (ref 0–0.7)
EOSINOPHIL NFR BLD AUTO: 0.1 % — SIGNIFICANT CHANGE UP (ref 0–8)
GLUCOSE BLDC GLUCOMTR-MCNC: 153 MG/DL — HIGH (ref 70–99)
GLUCOSE BLDC GLUCOMTR-MCNC: 157 MG/DL — HIGH (ref 70–99)
GLUCOSE BLDC GLUCOMTR-MCNC: 183 MG/DL — HIGH (ref 70–99)
GLUCOSE BLDC GLUCOMTR-MCNC: 209 MG/DL — HIGH (ref 70–99)
GLUCOSE SERPL-MCNC: 196 MG/DL — HIGH (ref 70–99)
HCT VFR BLD CALC: 32.7 % — LOW (ref 37–47)
HGB BLD-MCNC: 10.2 G/DL — LOW (ref 12–16)
IMM GRANULOCYTES NFR BLD AUTO: 0.5 % — HIGH (ref 0.1–0.3)
LYMPHOCYTES # BLD AUTO: 1.28 K/UL — SIGNIFICANT CHANGE UP (ref 1.2–3.4)
LYMPHOCYTES # BLD AUTO: 8.6 % — LOW (ref 20.5–51.1)
MAGNESIUM SERPL-MCNC: 2.4 MG/DL — SIGNIFICANT CHANGE UP (ref 1.8–2.4)
MCHC RBC-ENTMCNC: 28.3 PG — SIGNIFICANT CHANGE UP (ref 27–31)
MCHC RBC-ENTMCNC: 31.2 G/DL — LOW (ref 32–37)
MCV RBC AUTO: 90.8 FL — SIGNIFICANT CHANGE UP (ref 81–99)
MONOCYTES # BLD AUTO: 0.55 K/UL — SIGNIFICANT CHANGE UP (ref 0.1–0.6)
MONOCYTES NFR BLD AUTO: 3.7 % — SIGNIFICANT CHANGE UP (ref 1.7–9.3)
NEUTROPHILS # BLD AUTO: 12.96 K/UL — HIGH (ref 1.4–6.5)
NEUTROPHILS NFR BLD AUTO: 86.6 % — HIGH (ref 42.2–75.2)
NRBC # BLD: 0 /100 WBCS — SIGNIFICANT CHANGE UP (ref 0–0)
PLATELET # BLD AUTO: 692 K/UL — HIGH (ref 130–400)
PMV BLD: 10.1 FL — SIGNIFICANT CHANGE UP (ref 7.4–10.4)
POTASSIUM SERPL-MCNC: 4.7 MMOL/L — SIGNIFICANT CHANGE UP (ref 3.5–5)
POTASSIUM SERPL-SCNC: 4.7 MMOL/L — SIGNIFICANT CHANGE UP (ref 3.5–5)
RBC # BLD: 3.6 M/UL — LOW (ref 4.2–5.4)
RBC # FLD: 22.6 % — HIGH (ref 11.5–14.5)
SODIUM SERPL-SCNC: 139 MMOL/L — SIGNIFICANT CHANGE UP (ref 135–146)
WBC # BLD: 14.94 K/UL — HIGH (ref 4.8–10.8)
WBC # FLD AUTO: 14.94 K/UL — HIGH (ref 4.8–10.8)

## 2024-04-27 PROCEDURE — 99232 SBSQ HOSP IP/OBS MODERATE 35: CPT

## 2024-04-27 RX ADMIN — Medication 1 APPLICATION(S): at 18:18

## 2024-04-27 RX ADMIN — FAMOTIDINE 20 MILLIGRAM(S): 10 INJECTION INTRAVENOUS at 06:06

## 2024-04-27 RX ADMIN — MIDODRINE HYDROCHLORIDE 5 MILLIGRAM(S): 2.5 TABLET ORAL at 21:21

## 2024-04-27 RX ADMIN — Medication 650 MILLIGRAM(S): at 13:22

## 2024-04-27 RX ADMIN — LEVETIRACETAM 750 MILLIGRAM(S): 250 TABLET, FILM COATED ORAL at 18:17

## 2024-04-27 RX ADMIN — CHLORHEXIDINE GLUCONATE 1 APPLICATION(S): 213 SOLUTION TOPICAL at 11:53

## 2024-04-27 RX ADMIN — MEROPENEM 100 MILLIGRAM(S): 1 INJECTION INTRAVENOUS at 13:22

## 2024-04-27 RX ADMIN — MEROPENEM 100 MILLIGRAM(S): 1 INJECTION INTRAVENOUS at 06:23

## 2024-04-27 RX ADMIN — MIDODRINE HYDROCHLORIDE 5 MILLIGRAM(S): 2.5 TABLET ORAL at 06:06

## 2024-04-27 RX ADMIN — SCOPALAMINE 1 PATCH: 1 PATCH, EXTENDED RELEASE TRANSDERMAL at 08:21

## 2024-04-27 RX ADMIN — GABAPENTIN 300 MILLIGRAM(S): 400 CAPSULE ORAL at 18:17

## 2024-04-27 RX ADMIN — Medication 1 TABLET(S): at 11:47

## 2024-04-27 RX ADMIN — MEROPENEM 100 MILLIGRAM(S): 1 INJECTION INTRAVENOUS at 21:20

## 2024-04-27 RX ADMIN — Medication 3 MILLILITER(S): at 13:39

## 2024-04-27 RX ADMIN — RALOXIFENE HYDROCHLORIDE 60 MILLIGRAM(S): 60 TABLET, COATED ORAL at 11:47

## 2024-04-27 RX ADMIN — Medication 1 DROP(S): at 06:22

## 2024-04-27 RX ADMIN — SENNA PLUS 2 TABLET(S): 8.6 TABLET ORAL at 21:21

## 2024-04-27 RX ADMIN — LEVETIRACETAM 750 MILLIGRAM(S): 250 TABLET, FILM COATED ORAL at 06:05

## 2024-04-27 RX ADMIN — Medication 1 APPLICATION(S): at 06:21

## 2024-04-27 RX ADMIN — Medication 1 APPLICATION(S): at 06:22

## 2024-04-27 RX ADMIN — Medication 3 MILLILITER(S): at 08:02

## 2024-04-27 RX ADMIN — Medication 650 MILLIGRAM(S): at 21:27

## 2024-04-27 RX ADMIN — Medication 650 MILLIGRAM(S): at 13:50

## 2024-04-27 RX ADMIN — Medication 3 MILLILITER(S): at 02:10

## 2024-04-27 RX ADMIN — GABAPENTIN 300 MILLIGRAM(S): 400 CAPSULE ORAL at 06:05

## 2024-04-27 RX ADMIN — SCOPALAMINE 1 PATCH: 1 PATCH, EXTENDED RELEASE TRANSDERMAL at 07:00

## 2024-04-27 RX ADMIN — MIDODRINE HYDROCHLORIDE 5 MILLIGRAM(S): 2.5 TABLET ORAL at 13:22

## 2024-04-27 RX ADMIN — FAMOTIDINE 20 MILLIGRAM(S): 10 INJECTION INTRAVENOUS at 18:17

## 2024-04-27 RX ADMIN — Medication 650 MILLIGRAM(S): at 06:41

## 2024-04-27 RX ADMIN — SCOPALAMINE 1 PATCH: 1 PATCH, EXTENDED RELEASE TRANSDERMAL at 06:05

## 2024-04-27 RX ADMIN — Medication 1 DROP(S): at 18:18

## 2024-04-27 RX ADMIN — Medication 650 MILLIGRAM(S): at 07:01

## 2024-04-27 RX ADMIN — ENOXAPARIN SODIUM 30 MILLIGRAM(S): 100 INJECTION SUBCUTANEOUS at 13:22

## 2024-04-27 RX ADMIN — Medication 1 APPLICATION(S): at 11:47

## 2024-04-27 NOTE — PROGRESS NOTE ADULT - SUBJECTIVE AND OBJECTIVE BOX
TEA ECHAVARRIA  58y  Female      Patient is a 58y old  Female who presents with a chief complaint of Pneumonia.      INTERVAL HPI/OVERNIGHT EVENTS:      ******************************* REVIEW OF SYSTEMS:**********************************************    All other review of systems negative    *********************** VITALS ******************************************    T(F): 102.2 (04-27-24 @ 13:01)  HR: 120 (04-27-24 @ 13:01) (120 - 131)  BP: 91/55 (04-27-24 @ 13:01) (91/55 - 103/56)  RR: 20 (04-27-24 @ 07:40) (19 - 20)  SpO2: 96% (04-27-24 @ 13:01) (89% - 96%)    04-27-24 @ 07:01  -  04-27-24 @ 14:43  --------------------------------------------------------  IN: 290 mL / OUT: 0 mL / NET: 290 mL            04-27-24 @ 07:01  -  04-27-24 @ 14:43  --------------------------------------------------------  IN: 290 mL / OUT: 0 mL / NET: 290 mL        ******************************** PHYSICAL EXAM:**************************************************  GENERAL: NAD    PSYCH: no agitation,  HEENT:     NERVOUS SYSTEM:  Alert & awake x 2      PULMONARY: MARIE, CTA    CARDIOVASCULAR: S1S2 RRR    GI: Soft, NT, ND; BS present. PEG tube     EXTREMITIES:  B/L Feet ulcers     LYMPH: No lymphadenopathy noted    SKIN: No rashes or lesions      **************************** LABS *******************************************************                          10.2   14.94 )-----------( 692      ( 27 Apr 2024 08:09 )             32.7     04-27    139  |  98  |  17  ----------------------------<  196<H>  4.7   |  26  |  0.6<L>    Ca    8.8      27 Apr 2024 08:09  Mg     2.4     04-27        Urinalysis Basic - ( 27 Apr 2024 08:09 )    Color: x / Appearance: x / SG: x / pH: x  Gluc: 196 mg/dL / Ketone: x  / Bili: x / Urobili: x   Blood: x / Protein: x / Nitrite: x   Leuk Esterase: x / RBC: x / WBC x   Sq Epi: x / Non Sq Epi: x / Bacteria: x        Lactate Trend  04-24 @ 05:57 Lactate:1.2   04-23 @ 21:38 Lactate:1.3   04-23 @ 15:26 Lactate:1.9         CAPILLARY BLOOD GLUCOSE      POCT Blood Glucose.: 157 mg/dL (27 Apr 2024 11:19)          **************************Active Medications *******************************************  No Known Allergies      acetaminophen     Tablet .. 650 milliGRAM(s) Oral every 6 hours PRN  albuterol/ipratropium for Nebulization 3 milliLiter(s) Nebulizer every 6 hours  aluminum hydroxide/magnesium hydroxide/simethicone Suspension 30 milliLiter(s) Oral every 4 hours PRN  AQUAPHOR (petrolatum Ointment) 1 Application(s) Topical two times a day  artificial  tears Solution 1 Drop(s) Both EYES two times a day  calcium carbonate   1250 mG (OsCal) 1 Tablet(s) Oral daily  chlorhexidine 2% Cloths 1 Application(s) Topical daily  enoxaparin Injectable 30 milliGRAM(s) SubCutaneous every 24 hours  famotidine    Tablet 20 milliGRAM(s) Oral two times a day  gabapentin Solution 300 milliGRAM(s) Oral two times a day  hydrocortisone 1% Cream 1 Application(s) Topical daily  levETIRAcetam  Solution 750 milliGRAM(s) Oral two times a day  melatonin 3 milliGRAM(s) Oral at bedtime PRN  meropenem  IVPB 1000 milliGRAM(s) IV Intermittent every 8 hours  midodrine 5 milliGRAM(s) Oral every 8 hours  ondansetron Injectable 4 milliGRAM(s) IV Push every 8 hours PRN  raloxifene 60 milliGRAM(s) Oral daily  scopolamine 1 mG/72 Hr(s) Patch 1 Patch Transdermal every 72 hours  senna 2 Tablet(s) Oral at bedtime  silver sulfADIAZINE 1% Cream 1 Application(s) Topical two times a day      ***************************************************  RADIOLOGY & ADDITIONAL TESTS:    Imaging Personally Reviewed:  [ ] YES  [ ] NO    HEALTH ISSUES - PROBLEM Dx:  Septic shock    Acute respiratory failure with hypoxia    Advanced care planning/counseling discussion    Encounter for palliative care

## 2024-04-27 NOTE — PROGRESS NOTE ADULT - ASSESSMENT
57yo F w/ pmhx of Down's syndrome, cerebral palsy, seizure disorder presents from [facility] for respiratory distress. Admitted to SDU for severe sepsis and acute hypoxemic respiratory failure likely secondary to recurrent CAP.     # Acute hypoxemic respiratory failure   #Severe sepsis with septic shock  #possible recurrent CAP   #chronic hypotension   - CXR: possible basilar opacities  - covid/RSV, MSRA, negative; Bl cx with contaminant   - C/w Midodrine 5mg  - target MAP 60 ( Patient always has BP on the softer side )   - blood cx 4/15: staph capitis  - blood cx 4/15 and 4/17: NGTD  - Scopolamine for secretions. aspiration precautions   - Palliative care following   - 4/22 and 4/23: spiked fever and WBC ;WBC downtrending (10 on 4/26)  - Continue Meropenem (4/15 - )  - F/u bcx 4/23 -> no growth to date  - CT chest angio w/ contrast 4/24: bilateral opacities   - currently on 4L facemask  - 4/27 AM temp 101  - Follow up fungitell and sputum cultures  - Follow up ID    #Episodes of Vomiting:   - On PEG feed   - Aspiration risk ; C Xray done   - Can restart the PEG feeding for now     # B/l feet ulcers  - No abscess/cellulitis  - colonized with ESBL strain  - Off loading to prevent pressure sores   - wound care following     #seizure disorder  #Down syndrome  #Nonverbal bedbound  # PEG tube   - c/w home meds    Pending: Fungitell /IV Abx  Dispo:

## 2024-04-27 NOTE — PROGRESS NOTE ADULT - SUBJECTIVE AND OBJECTIVE BOX
24H events:    Patient is a 58y old Female who presents with a chief complaint of Pneumonia (26 Apr 2024 17:39)    Primary diagnosis of Acute sepsis      Day 1:      Today is 12d of hospitalization. This morning patient was seen and examined at bedside, resting comfortably in bed.    No acute or major events overnight.    Code Status:    Family communication:  Contact date:  Name of person contacted:  Relationship to patient:  Communication details:  What matters most:    PAST MEDICAL & SURGICAL HISTORY  Down syndrome    Osteoporosis    Mild anemia    Neuropathy    S/P debridement  of R hip on 3/2/21      SOCIAL HISTORY:  Social History:      ALLERGIES:  No Known Allergies    MEDICATIONS:  STANDING MEDICATIONS  albuterol/ipratropium for Nebulization 3 milliLiter(s) Nebulizer every 6 hours  AQUAPHOR (petrolatum Ointment) 1 Application(s) Topical two times a day  artificial  tears Solution 1 Drop(s) Both EYES two times a day  calcium carbonate   1250 mG (OsCal) 1 Tablet(s) Oral daily  chlorhexidine 2% Cloths 1 Application(s) Topical daily  enoxaparin Injectable 30 milliGRAM(s) SubCutaneous every 24 hours  famotidine    Tablet 20 milliGRAM(s) Oral two times a day  gabapentin Solution 300 milliGRAM(s) Oral two times a day  hydrocortisone 1% Cream 1 Application(s) Topical daily  levETIRAcetam  Solution 750 milliGRAM(s) Oral two times a day  meropenem  IVPB 1000 milliGRAM(s) IV Intermittent every 8 hours  midodrine 5 milliGRAM(s) Oral every 8 hours  raloxifene 60 milliGRAM(s) Oral daily  scopolamine 1 mG/72 Hr(s) Patch 1 Patch Transdermal every 72 hours  senna 2 Tablet(s) Oral at bedtime  silver sulfADIAZINE 1% Cream 1 Application(s) Topical two times a day    PRN MEDICATIONS  acetaminophen     Tablet .. 650 milliGRAM(s) Oral every 6 hours PRN  aluminum hydroxide/magnesium hydroxide/simethicone Suspension 30 milliLiter(s) Oral every 4 hours PRN  melatonin 3 milliGRAM(s) Oral at bedtime PRN  ondansetron Injectable 4 milliGRAM(s) IV Push every 8 hours PRN    VITALS:   T(F): 101.2  HR: 131  BP: 103/56  RR: 20  SpO2: 90%    PHYSICAL EXAM:  GENERAL:   ( x) NAD, lying in bed comfortably     (  ) obtunded     (  ) lethargic     (  ) somnolent    HEART:  Rate -->     (x) normal rate     (  ) bradycardic     (  ) tachycardic  Rhythm -->     (x) regular     (  ) regularly irregular     (  ) irregularly irregular  Murmurs -->     (x) normal s1s2     (  ) systolic murmur     (  ) diastolic murmur     (  ) continuous murmur      (  ) S3 present     (  ) S4 present    LUNGS:   ( x)Unlabored respirations     (  ) tachypnea  ( x) B/L air entry     (  ) decreased breath sounds in:  (location     )    ( x) no adventitious sound     (  ) crackles     (  ) wheezing      (  ) rhonchi      (specify location:       )  (  ) chest wall tenderness (specify location:       )    ABDOMEN:   ( x) Soft     (  ) tense   |   (  ) nondistended     (  ) distended   |   (  ) +BS     (  ) hypoactive bowel sounds     (  ) hyperactive bowel sounds  ( x) nontender     (  ) RUQ tenderness     (  ) RLQ tenderness     (  ) LLQ tenderness     (  ) epigastric tenderness     (  ) diffuse tenderness  (  ) Splenomegaly      (  ) Hepatomegaly      (  ) Jaundice     (  ) ecchymosis     EXTREMITIES:  ( x) Normal     (  ) Rash     (  ) ecchymosis     (  ) varicose veins      (  ) pitting edema     (  ) non-pitting edema   (  ) ulceration     (  ) gangrene:     (location:     )    NERVOUS SYSTEM:    ( x) A&Ox3     (  ) confused     (  ) lethargic        LABS:                        10.2   14.94 )-----------( 692      ( 27 Apr 2024 08:09 )             32.7     04-27    139  |  98  |  17  ----------------------------<  196<H>  4.7   |  26  |  0.6<L>    Ca    8.8      27 Apr 2024 08:09  Mg     2.4     04-27        Urinalysis Basic - ( 27 Apr 2024 08:09 )    Color: x / Appearance: x / SG: x / pH: x  Gluc: 196 mg/dL / Ketone: x  / Bili: x / Urobili: x   Blood: x / Protein: x / Nitrite: x   Leuk Esterase: x / RBC: x / WBC x   Sq Epi: x / Non Sq Epi: x / Bacteria: x                RADIOLOGY:    ASSESSMENT AND PLAN  57yo F w/ pmhx of Down's syndrome, cerebral palsy, seizure disorder presents from [facility] for respiratory distress. Admitted to SDU for severe sepsis and acute hypoxemic respiratory failure likely secondary to recurrent CAP.     #acute hypoxemic respiratory failure   #Severe sepsis with septic shock  #possible recurrent CAP   #chronic hypotension   #metabolic encephalopathy  - CXR: possible basilar opacities  - covid/RSV, MSRA, negative; Bl cx with contaminant   - C/w Midodrine 5mg  - target MAP 60 ( Patient always has BP on the softer side )   - blood cx 4/15: staph capitis  - blood cx 4/15 and 4/17: NGTD  - Scopolamine for secretions. aspiration precautions   - Palliative care following   - 4/22 and 4/23: spiked fever and WBC ;WBC downtrending (10 on 4/26)  - Continue Meropenem (4/15 - )  - F/u bcx 4/23 -> no growth to date  - CT chest angio w/ contrast 4/24: bilateral opacities   - currently on 4L facemask  - 4/27 AM temp 101  - Follow up fungitell and sputum cultures  - Follow up ID    #Episodes of Vomiting:   - On PEG feed   - Aspiration risk ; C Xray done   - Can restart the PEG feeding for now     # B/l feet ulcers  - No abscess/cellulitis  - colonized with ESBL strain  - Off loading to prevent pressure sores   - wound care following     #seizure disorder  #Down syndrome  #Nonverbal bedbound  # PEG tube   - c/w home meds

## 2024-04-28 LAB
ANION GAP SERPL CALC-SCNC: 10 MMOL/L — SIGNIFICANT CHANGE UP (ref 7–14)
BASOPHILS # BLD AUTO: 0.06 K/UL — SIGNIFICANT CHANGE UP (ref 0–0.2)
BASOPHILS NFR BLD AUTO: 0.6 % — SIGNIFICANT CHANGE UP (ref 0–1)
BUN SERPL-MCNC: 15 MG/DL — SIGNIFICANT CHANGE UP (ref 10–20)
CALCIUM SERPL-MCNC: 8.4 MG/DL — SIGNIFICANT CHANGE UP (ref 8.4–10.5)
CHLORIDE SERPL-SCNC: 100 MMOL/L — SIGNIFICANT CHANGE UP (ref 98–110)
CO2 SERPL-SCNC: 28 MMOL/L — SIGNIFICANT CHANGE UP (ref 17–32)
CREAT SERPL-MCNC: 0.5 MG/DL — LOW (ref 0.7–1.5)
CULTURE RESULTS: SIGNIFICANT CHANGE UP
EGFR: 109 ML/MIN/1.73M2 — SIGNIFICANT CHANGE UP
EOSINOPHIL # BLD AUTO: 0.01 K/UL — SIGNIFICANT CHANGE UP (ref 0–0.7)
EOSINOPHIL NFR BLD AUTO: 0.1 % — SIGNIFICANT CHANGE UP (ref 0–8)
GLUCOSE BLDC GLUCOMTR-MCNC: 134 MG/DL — HIGH (ref 70–99)
GLUCOSE BLDC GLUCOMTR-MCNC: 139 MG/DL — HIGH (ref 70–99)
GLUCOSE BLDC GLUCOMTR-MCNC: 158 MG/DL — HIGH (ref 70–99)
GLUCOSE SERPL-MCNC: 188 MG/DL — HIGH (ref 70–99)
HCT VFR BLD CALC: 30.1 % — LOW (ref 37–47)
HGB BLD-MCNC: 9.1 G/DL — LOW (ref 12–16)
IMM GRANULOCYTES NFR BLD AUTO: 0.6 % — HIGH (ref 0.1–0.3)
LYMPHOCYTES # BLD AUTO: 1.41 K/UL — SIGNIFICANT CHANGE UP (ref 1.2–3.4)
LYMPHOCYTES # BLD AUTO: 13 % — LOW (ref 20.5–51.1)
MAGNESIUM SERPL-MCNC: 2.3 MG/DL — SIGNIFICANT CHANGE UP (ref 1.8–2.4)
MCHC RBC-ENTMCNC: 28.2 PG — SIGNIFICANT CHANGE UP (ref 27–31)
MCHC RBC-ENTMCNC: 30.2 G/DL — LOW (ref 32–37)
MCV RBC AUTO: 93.2 FL — SIGNIFICANT CHANGE UP (ref 81–99)
MONOCYTES # BLD AUTO: 0.49 K/UL — SIGNIFICANT CHANGE UP (ref 0.1–0.6)
MONOCYTES NFR BLD AUTO: 4.5 % — SIGNIFICANT CHANGE UP (ref 1.7–9.3)
NEUTROPHILS # BLD AUTO: 8.81 K/UL — HIGH (ref 1.4–6.5)
NEUTROPHILS NFR BLD AUTO: 81.2 % — HIGH (ref 42.2–75.2)
NRBC # BLD: 0 /100 WBCS — SIGNIFICANT CHANGE UP (ref 0–0)
PLATELET # BLD AUTO: 559 K/UL — HIGH (ref 130–400)
PMV BLD: 10.2 FL — SIGNIFICANT CHANGE UP (ref 7.4–10.4)
POTASSIUM SERPL-MCNC: 4.5 MMOL/L — SIGNIFICANT CHANGE UP (ref 3.5–5)
POTASSIUM SERPL-SCNC: 4.5 MMOL/L — SIGNIFICANT CHANGE UP (ref 3.5–5)
RBC # BLD: 3.23 M/UL — LOW (ref 4.2–5.4)
RBC # FLD: 23 % — HIGH (ref 11.5–14.5)
SODIUM SERPL-SCNC: 138 MMOL/L — SIGNIFICANT CHANGE UP (ref 135–146)
SPECIMEN SOURCE: SIGNIFICANT CHANGE UP
WBC # BLD: 10.84 K/UL — HIGH (ref 4.8–10.8)
WBC # FLD AUTO: 10.84 K/UL — HIGH (ref 4.8–10.8)

## 2024-04-28 PROCEDURE — 99232 SBSQ HOSP IP/OBS MODERATE 35: CPT

## 2024-04-28 RX ORDER — SODIUM CHLORIDE 9 MG/ML
500 INJECTION, SOLUTION INTRAVENOUS ONCE
Refills: 0 | Status: COMPLETED | OUTPATIENT
Start: 2024-04-28 | End: 2024-04-28

## 2024-04-28 RX ORDER — ACETAMINOPHEN 500 MG
650 TABLET ORAL ONCE
Refills: 0 | Status: COMPLETED | OUTPATIENT
Start: 2024-04-28 | End: 2024-04-28

## 2024-04-28 RX ADMIN — Medication 1 APPLICATION(S): at 17:27

## 2024-04-28 RX ADMIN — MIDODRINE HYDROCHLORIDE 5 MILLIGRAM(S): 2.5 TABLET ORAL at 22:14

## 2024-04-28 RX ADMIN — FAMOTIDINE 20 MILLIGRAM(S): 10 INJECTION INTRAVENOUS at 17:26

## 2024-04-28 RX ADMIN — Medication 650 MILLIGRAM(S): at 15:54

## 2024-04-28 RX ADMIN — Medication 650 MILLIGRAM(S): at 09:15

## 2024-04-28 RX ADMIN — Medication 650 MILLIGRAM(S): at 16:51

## 2024-04-28 RX ADMIN — Medication 3 MILLILITER(S): at 21:21

## 2024-04-28 RX ADMIN — Medication 1 APPLICATION(S): at 17:24

## 2024-04-28 RX ADMIN — Medication 260 MILLIGRAM(S): at 04:13

## 2024-04-28 RX ADMIN — MEROPENEM 100 MILLIGRAM(S): 1 INJECTION INTRAVENOUS at 13:16

## 2024-04-28 RX ADMIN — Medication 650 MILLIGRAM(S): at 08:45

## 2024-04-28 RX ADMIN — Medication 1 DROP(S): at 05:51

## 2024-04-28 RX ADMIN — SENNA PLUS 2 TABLET(S): 8.6 TABLET ORAL at 22:23

## 2024-04-28 RX ADMIN — MEROPENEM 100 MILLIGRAM(S): 1 INJECTION INTRAVENOUS at 05:48

## 2024-04-28 RX ADMIN — Medication 3 MILLILITER(S): at 03:31

## 2024-04-28 RX ADMIN — CHLORHEXIDINE GLUCONATE 1 APPLICATION(S): 213 SOLUTION TOPICAL at 11:44

## 2024-04-28 RX ADMIN — ENOXAPARIN SODIUM 30 MILLIGRAM(S): 100 INJECTION SUBCUTANEOUS at 13:14

## 2024-04-28 RX ADMIN — MIDODRINE HYDROCHLORIDE 5 MILLIGRAM(S): 2.5 TABLET ORAL at 05:51

## 2024-04-28 RX ADMIN — Medication 3 MILLILITER(S): at 08:24

## 2024-04-28 RX ADMIN — LEVETIRACETAM 750 MILLIGRAM(S): 250 TABLET, FILM COATED ORAL at 05:50

## 2024-04-28 RX ADMIN — Medication 3 MILLILITER(S): at 15:49

## 2024-04-28 RX ADMIN — Medication 1 APPLICATION(S): at 11:44

## 2024-04-28 RX ADMIN — Medication 1 APPLICATION(S): at 05:51

## 2024-04-28 RX ADMIN — GABAPENTIN 300 MILLIGRAM(S): 400 CAPSULE ORAL at 17:25

## 2024-04-28 RX ADMIN — FAMOTIDINE 20 MILLIGRAM(S): 10 INJECTION INTRAVENOUS at 05:50

## 2024-04-28 RX ADMIN — Medication 1 DROP(S): at 17:27

## 2024-04-28 RX ADMIN — Medication 1 TABLET(S): at 11:43

## 2024-04-28 RX ADMIN — MIDODRINE HYDROCHLORIDE 5 MILLIGRAM(S): 2.5 TABLET ORAL at 13:19

## 2024-04-28 RX ADMIN — GABAPENTIN 300 MILLIGRAM(S): 400 CAPSULE ORAL at 05:50

## 2024-04-28 RX ADMIN — LEVETIRACETAM 750 MILLIGRAM(S): 250 TABLET, FILM COATED ORAL at 17:25

## 2024-04-28 RX ADMIN — SODIUM CHLORIDE 1000 MILLILITER(S): 9 INJECTION, SOLUTION INTRAVENOUS at 02:00

## 2024-04-28 RX ADMIN — MEROPENEM 100 MILLIGRAM(S): 1 INJECTION INTRAVENOUS at 22:15

## 2024-04-28 RX ADMIN — Medication 1 APPLICATION(S): at 05:52

## 2024-04-28 RX ADMIN — RALOXIFENE HYDROCHLORIDE 60 MILLIGRAM(S): 60 TABLET, COATED ORAL at 11:44

## 2024-04-28 NOTE — PROGRESS NOTE ADULT - SUBJECTIVE AND OBJECTIVE BOX
TEA ECHAVARRIA  58y  Female      Patient is a 58y old  Female who presents with a chief complaint of Pneumonia.      INTERVAL HPI/OVERNIGHT EVENTS:      ******************************* REVIEW OF SYSTEMS:**********************************************    keeps spiking fever , Tmax 102.2    *********************** VITALS ******************************************    T(F): 102.2 (04-28-24 @ 14:19)  HR: 108 (04-28-24 @ 14:19) (106 - 122)  BP: 109/61 (04-28-24 @ 12:10) (92/53 - 118/55)  RR: 18 (04-28-24 @ 12:10) (18 - 18)  SpO2: 94% (04-28-24 @ 14:19) (91% - 98%)    04-27-24 @ 07:01  -  04-28-24 @ 07:00  --------------------------------------------------------  IN: 1090 mL / OUT: 0 mL / NET: 1090 mL    04-28-24 @ 07:01  -  04-28-24 @ 15:13  --------------------------------------------------------  IN: 290 mL / OUT: 0 mL / NET: 290 mL            04-27-24 @ 07:01  -  04-28-24 @ 07:00  --------------------------------------------------------  IN: 1090 mL / OUT: 0 mL / NET: 1090 mL    04-28-24 @ 07:01  -  04-28-24 @ 15:13  --------------------------------------------------------  IN: 290 mL / OUT: 0 mL / NET: 290 mL        ******************************** PHYSICAL EXAM:**************************************************  GENERAL: NAD    PSYCH: no agitation, HEENT:     NERVOUS SYSTEM:  Alert & awake x2 , mentally challenged     PULMONARY: MARIE, CTA    CARDIOVASCULAR: S1S2 RRR    GI: Soft, NT, ND; BS present.    EXTREMITIES:  2+ Peripheral Pulses, No clubbing, cyanosis, or edema    LYMPH: No lymphadenopathy noted    SKIN: No rashes or lesions      **************************** LABS *******************************************************                          9.1    10.84 )-----------( 559      ( 28 Apr 2024 07:23 )             30.1     04-28    138  |  100  |  15  ----------------------------<  188<H>  4.5   |  28  |  0.5<L>    Ca    8.4      28 Apr 2024 07:23  Mg     2.3     04-28        Urinalysis Basic - ( 28 Apr 2024 07:23 )    Color: x / Appearance: x / SG: x / pH: x  Gluc: 188 mg/dL / Ketone: x  / Bili: x / Urobili: x   Blood: x / Protein: x / Nitrite: x   Leuk Esterase: x / RBC: x / WBC x   Sq Epi: x / Non Sq Epi: x / Bacteria: x        Lactate Trend  04-24 @ 05:57 Lactate:1.2   04-23 @ 21:38 Lactate:1.3   04-23 @ 15:26 Lactate:1.9         CAPILLARY BLOOD GLUCOSE      POCT Blood Glucose.: 134 mg/dL (28 Apr 2024 11:30)          **************************Active Medications *******************************************  No Known Allergies      acetaminophen     Tablet .. 650 milliGRAM(s) Oral every 6 hours PRN  albuterol/ipratropium for Nebulization 3 milliLiter(s) Nebulizer every 6 hours  aluminum hydroxide/magnesium hydroxide/simethicone Suspension 30 milliLiter(s) Oral every 4 hours PRN  AQUAPHOR (petrolatum Ointment) 1 Application(s) Topical two times a day  artificial  tears Solution 1 Drop(s) Both EYES two times a day  calcium carbonate   1250 mG (OsCal) 1 Tablet(s) Oral daily  chlorhexidine 2% Cloths 1 Application(s) Topical daily  enoxaparin Injectable 30 milliGRAM(s) SubCutaneous every 24 hours  famotidine    Tablet 20 milliGRAM(s) Oral two times a day  gabapentin Solution 300 milliGRAM(s) Oral two times a day  hydrocortisone 1% Cream 1 Application(s) Topical daily  levETIRAcetam  Solution 750 milliGRAM(s) Oral two times a day  melatonin 3 milliGRAM(s) Oral at bedtime PRN  meropenem  IVPB 1000 milliGRAM(s) IV Intermittent every 8 hours  midodrine 5 milliGRAM(s) Oral every 8 hours  ondansetron Injectable 4 milliGRAM(s) IV Push every 8 hours PRN  raloxifene 60 milliGRAM(s) Oral daily  scopolamine 1 mG/72 Hr(s) Patch 1 Patch Transdermal every 72 hours  senna 2 Tablet(s) Oral at bedtime  silver sulfADIAZINE 1% Cream 1 Application(s) Topical two times a day      ***************************************************  RADIOLOGY & ADDITIONAL TESTS:    Imaging Personally Reviewed:  [ ] YES  [ ] NO    HEALTH ISSUES - PROBLEM Dx:  Septic shock    Acute respiratory failure with hypoxia    Advanced care planning/counseling discussion    Encounter for palliative care

## 2024-04-28 NOTE — PROGRESS NOTE ADULT - ASSESSMENT
59yo F w/ pmhx of Down's syndrome, cerebral palsy, seizure disorder presents from [facility] for respiratory distress. Admitted to SDU for severe sepsis and acute hypoxemic respiratory failure likely secondary to recurrent CAP.     # Acute hypoxemic respiratory failure   #Severe sepsis with septic shock  #possible recurrent CAP   #chronic hypotension   - CXR: possible basilar opacities  - covid/RSV, MSRA, negative; Bl cx with contaminant   - C/w Midodrine 5mg  - target MAP 60 ( Patient always has BP on the softer side )   - blood cx 4/15: staph capitis  - blood cx 4/15 and 4/17: NGTD  - Scopolamine for secretions. aspiration precautions   - Palliative care following   - 4/22 and 4/23: spiked fever and WBC ;WBC downtrending (10 on 4/26)  - Continue Meropenem (4/15 - )  - F/u bcx 4/23 -> no growth to date  - CT chest angio w/ contrast 4/24: bilateral opacities   - currently on 4L facemask  - 4/27 AM temp 101  - 4/28 >> Tmax 1.2.2   - Follow up fungitell and sputum cultures  - Follow up ID    #Episodes of Vomiting:   - On PEG feed   - Aspiration risk ; C Xray done   - Can restart the PEG feeding for now     # B/l feet ulcers  - No abscess/cellulitis  - colonized with ESBL strain  - Off loading to prevent pressure sores   - wound care following     #seizure disorder  #Down syndrome  #Nonverbal bedbound  # PEG tube   - c/w home meds    Pending: Fungitell /IV Abx  Dispo:

## 2024-04-29 LAB
ANION GAP SERPL CALC-SCNC: 11 MMOL/L — SIGNIFICANT CHANGE UP (ref 7–14)
BASOPHILS # BLD AUTO: 0.05 K/UL — SIGNIFICANT CHANGE UP (ref 0–0.2)
BASOPHILS NFR BLD AUTO: 0.6 % — SIGNIFICANT CHANGE UP (ref 0–1)
BUN SERPL-MCNC: 16 MG/DL — SIGNIFICANT CHANGE UP (ref 10–20)
CALCIUM SERPL-MCNC: 8.7 MG/DL — SIGNIFICANT CHANGE UP (ref 8.4–10.5)
CHLORIDE SERPL-SCNC: 99 MMOL/L — SIGNIFICANT CHANGE UP (ref 98–110)
CO2 SERPL-SCNC: 28 MMOL/L — SIGNIFICANT CHANGE UP (ref 17–32)
CREAT SERPL-MCNC: 0.5 MG/DL — LOW (ref 0.7–1.5)
EGFR: 109 ML/MIN/1.73M2 — SIGNIFICANT CHANGE UP
EOSINOPHIL # BLD AUTO: 0 K/UL — SIGNIFICANT CHANGE UP (ref 0–0.7)
EOSINOPHIL NFR BLD AUTO: 0 % — SIGNIFICANT CHANGE UP (ref 0–8)
FUNGITELL: 53 PG/ML — SIGNIFICANT CHANGE UP
GLUCOSE BLDC GLUCOMTR-MCNC: 120 MG/DL — HIGH (ref 70–99)
GLUCOSE BLDC GLUCOMTR-MCNC: 122 MG/DL — HIGH (ref 70–99)
GLUCOSE BLDC GLUCOMTR-MCNC: 130 MG/DL — HIGH (ref 70–99)
GLUCOSE BLDC GLUCOMTR-MCNC: 184 MG/DL — HIGH (ref 70–99)
GLUCOSE SERPL-MCNC: 206 MG/DL — HIGH (ref 70–99)
HCT VFR BLD CALC: 31.1 % — LOW (ref 37–47)
HGB BLD-MCNC: 9.6 G/DL — LOW (ref 12–16)
IMM GRANULOCYTES NFR BLD AUTO: 0.8 % — HIGH (ref 0.1–0.3)
LYMPHOCYTES # BLD AUTO: 0.86 K/UL — LOW (ref 1.2–3.4)
LYMPHOCYTES # BLD AUTO: 10.4 % — LOW (ref 20.5–51.1)
MAGNESIUM SERPL-MCNC: 2.8 MG/DL — HIGH (ref 1.8–2.4)
MCHC RBC-ENTMCNC: 28.5 PG — SIGNIFICANT CHANGE UP (ref 27–31)
MCHC RBC-ENTMCNC: 30.9 G/DL — LOW (ref 32–37)
MCV RBC AUTO: 92.3 FL — SIGNIFICANT CHANGE UP (ref 81–99)
MONOCYTES # BLD AUTO: 0.37 K/UL — SIGNIFICANT CHANGE UP (ref 0.1–0.6)
MONOCYTES NFR BLD AUTO: 4.5 % — SIGNIFICANT CHANGE UP (ref 1.7–9.3)
NEUTROPHILS # BLD AUTO: 6.89 K/UL — HIGH (ref 1.4–6.5)
NEUTROPHILS NFR BLD AUTO: 83.7 % — HIGH (ref 42.2–75.2)
NRBC # BLD: 0 /100 WBCS — SIGNIFICANT CHANGE UP (ref 0–0)
PLATELET # BLD AUTO: 561 K/UL — HIGH (ref 130–400)
PMV BLD: 10.1 FL — SIGNIFICANT CHANGE UP (ref 7.4–10.4)
POTASSIUM SERPL-MCNC: 4.8 MMOL/L — SIGNIFICANT CHANGE UP (ref 3.5–5)
POTASSIUM SERPL-SCNC: 4.8 MMOL/L — SIGNIFICANT CHANGE UP (ref 3.5–5)
RBC # BLD: 3.37 M/UL — LOW (ref 4.2–5.4)
RBC # FLD: 22.8 % — HIGH (ref 11.5–14.5)
SODIUM SERPL-SCNC: 138 MMOL/L — SIGNIFICANT CHANGE UP (ref 135–146)
WBC # BLD: 8.24 K/UL — SIGNIFICANT CHANGE UP (ref 4.8–10.8)
WBC # FLD AUTO: 8.24 K/UL — SIGNIFICANT CHANGE UP (ref 4.8–10.8)

## 2024-04-29 PROCEDURE — 99232 SBSQ HOSP IP/OBS MODERATE 35: CPT

## 2024-04-29 RX ADMIN — MEROPENEM 100 MILLIGRAM(S): 1 INJECTION INTRAVENOUS at 13:05

## 2024-04-29 RX ADMIN — MEROPENEM 100 MILLIGRAM(S): 1 INJECTION INTRAVENOUS at 05:17

## 2024-04-29 RX ADMIN — SCOPALAMINE 1 PATCH: 1 PATCH, EXTENDED RELEASE TRANSDERMAL at 06:36

## 2024-04-29 RX ADMIN — FAMOTIDINE 20 MILLIGRAM(S): 10 INJECTION INTRAVENOUS at 17:42

## 2024-04-29 RX ADMIN — MIDODRINE HYDROCHLORIDE 5 MILLIGRAM(S): 2.5 TABLET ORAL at 22:42

## 2024-04-29 RX ADMIN — MIDODRINE HYDROCHLORIDE 5 MILLIGRAM(S): 2.5 TABLET ORAL at 13:05

## 2024-04-29 RX ADMIN — LEVETIRACETAM 750 MILLIGRAM(S): 250 TABLET, FILM COATED ORAL at 17:42

## 2024-04-29 RX ADMIN — Medication 3 MILLILITER(S): at 02:59

## 2024-04-29 RX ADMIN — Medication 3 MILLILITER(S): at 08:36

## 2024-04-29 RX ADMIN — Medication 1 TABLET(S): at 11:50

## 2024-04-29 RX ADMIN — SENNA PLUS 2 TABLET(S): 8.6 TABLET ORAL at 22:42

## 2024-04-29 RX ADMIN — Medication 650 MILLIGRAM(S): at 17:44

## 2024-04-29 RX ADMIN — Medication 1 DROP(S): at 18:06

## 2024-04-29 RX ADMIN — GABAPENTIN 300 MILLIGRAM(S): 400 CAPSULE ORAL at 18:06

## 2024-04-29 RX ADMIN — CHLORHEXIDINE GLUCONATE 1 APPLICATION(S): 213 SOLUTION TOPICAL at 11:50

## 2024-04-29 RX ADMIN — LEVETIRACETAM 750 MILLIGRAM(S): 250 TABLET, FILM COATED ORAL at 05:14

## 2024-04-29 RX ADMIN — Medication 650 MILLIGRAM(S): at 06:20

## 2024-04-29 RX ADMIN — Medication 1 APPLICATION(S): at 17:45

## 2024-04-29 RX ADMIN — MIDODRINE HYDROCHLORIDE 5 MILLIGRAM(S): 2.5 TABLET ORAL at 05:15

## 2024-04-29 RX ADMIN — Medication 3 MILLILITER(S): at 14:41

## 2024-04-29 RX ADMIN — SCOPALAMINE 1 PATCH: 1 PATCH, EXTENDED RELEASE TRANSDERMAL at 19:49

## 2024-04-29 RX ADMIN — GABAPENTIN 300 MILLIGRAM(S): 400 CAPSULE ORAL at 05:44

## 2024-04-29 RX ADMIN — ENOXAPARIN SODIUM 30 MILLIGRAM(S): 100 INJECTION SUBCUTANEOUS at 13:04

## 2024-04-29 RX ADMIN — Medication 1 APPLICATION(S): at 05:16

## 2024-04-29 RX ADMIN — FAMOTIDINE 20 MILLIGRAM(S): 10 INJECTION INTRAVENOUS at 05:15

## 2024-04-29 RX ADMIN — MEROPENEM 100 MILLIGRAM(S): 1 INJECTION INTRAVENOUS at 22:44

## 2024-04-29 RX ADMIN — RALOXIFENE HYDROCHLORIDE 60 MILLIGRAM(S): 60 TABLET, COATED ORAL at 11:49

## 2024-04-29 RX ADMIN — Medication 1 DROP(S): at 05:17

## 2024-04-29 NOTE — PROGRESS NOTE ADULT - ASSESSMENT
59yo F w/ pmhx of Down's syndrome, cerebral palsy, seizure disorder presents from [facility] for respiratory distress. Admitted to SDU for severe sepsis and acute hypoxemic respiratory failure likely secondary to recurrent CAP.     # Acute hypoxemic respiratory failure   #Severe sepsis with septic shock  #possible recurrent CAP   #chronic hypotension   - CXR: possible basilar opacities  - covid/RSV, MSRA, negative; Bl cx with contaminant   - C/w Midodrine 5mg  - target MAP 60 ( Patient always has BP on the softer side )   - blood cx 4/15: staph capitis  - blood cx 4/15 and 4/17: NGTD  - Scopolamine for secretions. aspiration precautions   - Palliative care following   - 4/22 and 4/23: spiked fever and WBC ;WBC downtrending (10 on 4/26)  - Continue Meropenem (4/15 - )  - F/u bcx 4/23 -> no growth to date  - CT chest angio w/ contrast 4/24: bilateral opacities   - currently on 4L facemask  - 4/27 AM temp 101  - 4/28 >> Tmax 102.2   - 4/29 >> Tmax 99.4  - Follow up fungitell and sputum cultures  - Follow up ID    #Episodes of Vomiting:   - On PEG feed   - Aspiration risk ; C Xray done   - Can restart the PEG feeding for now     # B/l feet ulcers  - No abscess/cellulitis  - colonized with ESBL strain  - Off loading to prevent pressure sores   - wound care following     #seizure disorder  #Down syndrome  #Nonverbal bedbound  # PEG tube   - c/w home meds    Pending: Fungitell /IV Abx  Dispo:

## 2024-04-29 NOTE — PROGRESS NOTE ADULT - SUBJECTIVE AND OBJECTIVE BOX
TEA ECHAVARRIA  58y  Female      Patient is a 58y old  Female who presents with a chief complaint of Pneumonia.   INTERVAL HPI/OVERNIGHT EVENTS:      ******************************* REVIEW OF SYSTEMS:**********************************************  unable to obtain for poor mental status.   *********************** VITALS ******************************************    T(F): 99.4 (04-29-24 @ 12:43)  HR: 108 (04-29-24 @ 12:43) (107 - 126)  BP: 110/65 (04-29-24 @ 12:43) (90/59 - 123/63)  RR: 18 (04-29-24 @ 05:38) (18 - 18)  SpO2: 97% (04-29-24 @ 12:43) (94% - 97%)    04-28-24 @ 07:01  -  04-29-24 @ 07:00  --------------------------------------------------------  IN: 290 mL / OUT: 0 mL / NET: 290 mL    04-29-24 @ 07:01  -  04-29-24 @ 14:21  --------------------------------------------------------  IN: 290 mL / OUT: 0 mL / NET: 290 mL            04-28-24 @ 07:01  -  04-29-24 @ 07:00  --------------------------------------------------------  IN: 290 mL / OUT: 0 mL / NET: 290 mL    04-29-24 @ 07:01  -  04-29-24 @ 14:21  --------------------------------------------------------  IN: 290 mL / OUT: 0 mL / NET: 290 mL        ******************************** PHYSICAL EXAM:**************************************************  GENERAL: NAD    PSYCH: no agitation,  HEENT:     NERVOUS SYSTEM:  Alert & awake x 1-2, mentally challenged.     PULMONARY: MARIE, CTA    CARDIOVASCULAR: S1S2 RRR    GI: Soft, NT, ND; BS present.    EXTREMITIES:  2+ Peripheral Pulses, No clubbing, cyanosis, or edema    LYMPH: No lymphadenopathy noted    SKIN: No rashes or lesions      **************************** LABS *******************************************************                          9.6    8.24  )-----------( 561      ( 29 Apr 2024 06:44 )             31.1     04-29    138  |  99  |  16  ----------------------------<  206<H>  4.8   |  28  |  0.5<L>    Ca    8.7      29 Apr 2024 06:44  Mg     2.8     04-29        Urinalysis Basic - ( 29 Apr 2024 06:44 )    Color: x / Appearance: x / SG: x / pH: x  Gluc: 206 mg/dL / Ketone: x  / Bili: x / Urobili: x   Blood: x / Protein: x / Nitrite: x   Leuk Esterase: x / RBC: x / WBC x   Sq Epi: x / Non Sq Epi: x / Bacteria: x        Lactate Trend        CAPILLARY BLOOD GLUCOSE      POCT Blood Glucose.: 122 mg/dL (29 Apr 2024 11:59)          **************************Active Medications *******************************************  No Known Allergies      acetaminophen     Tablet .. 650 milliGRAM(s) Oral every 6 hours PRN  albuterol/ipratropium for Nebulization 3 milliLiter(s) Nebulizer every 6 hours  aluminum hydroxide/magnesium hydroxide/simethicone Suspension 30 milliLiter(s) Oral every 4 hours PRN  AQUAPHOR (petrolatum Ointment) 1 Application(s) Topical two times a day  artificial  tears Solution 1 Drop(s) Both EYES two times a day  calcium carbonate   1250 mG (OsCal) 1 Tablet(s) Oral daily  chlorhexidine 2% Cloths 1 Application(s) Topical daily  enoxaparin Injectable 30 milliGRAM(s) SubCutaneous every 24 hours  famotidine    Tablet 20 milliGRAM(s) Oral two times a day  gabapentin Solution 300 milliGRAM(s) Oral two times a day  hydrocortisone 1% Cream 1 Application(s) Topical daily  levETIRAcetam  Solution 750 milliGRAM(s) Oral two times a day  melatonin 3 milliGRAM(s) Oral at bedtime PRN  meropenem  IVPB 1000 milliGRAM(s) IV Intermittent every 8 hours  midodrine 5 milliGRAM(s) Oral every 8 hours  ondansetron Injectable 4 milliGRAM(s) IV Push every 8 hours PRN  raloxifene 60 milliGRAM(s) Oral daily  scopolamine 1 mG/72 Hr(s) Patch 1 Patch Transdermal every 72 hours  senna 2 Tablet(s) Oral at bedtime  silver sulfADIAZINE 1% Cream 1 Application(s) Topical two times a day      ***************************************************  RADIOLOGY & ADDITIONAL TESTS:    Imaging Personally Reviewed:  [ ] YES  [ ] NO    HEALTH ISSUES - PROBLEM Dx:  Septic shock    Acute respiratory failure with hypoxia    Advanced care planning/counseling discussion    Encounter for palliative care

## 2024-04-29 NOTE — PROGRESS NOTE ADULT - ASSESSMENT
57yo F w/ pmhx of Down's syndrome, cerebral palsy, seizure disorder presents from [facility] for respiratory distress. Admitted to SDU for severe sepsis and acute hypoxemic respiratory failure likely secondary to recurrent CAP.     #acute hypoxemic respiratory failure   #Severe sepsis with septic shock  #possible recurrent CAP   #chronic hypotension   #metabolic encephalopathy  - CXR: possible basilar opacities  - covid/RSV, MSRA, negative; Bl cx with contaminant   - C/w Midodrine 5mg  - target MAP 60 ( Patient always has Bp on the softer side )   - blood cx 4/15: staph capitis  - blood cx 4/15 and 4/17: NGTD  - Scopolamine for secretions. aspiration precautions   - completed course of Meropenem   - 4/22 and 4/23: spiked fever and WBC ; 4/24 and 4/25 febrile but WBC downtrending (12 on 4/25)  - restarted Alicia per ID -> f/u ID rec for abx coverage  - F/u bcx 4/23 -> no growth as of 4/25  - CT chest angio w/ contrast to rule out PE -> no PE on CTA but bilateral opacities seen   - currently on 4L nc  - f/u sputum culture    #Episodes of Vomiting:   - On PEG feed   - Aspiration risk ; C Xray done   - restart the PEG feeding for now     # B/l feet ulcers  - No abscess/cellulitis  - colonized with ESBL strain  - Off loading to prevent pressure sores   - wound care following     #seizure disorder  #Down syndrome  #Nonverbal bedbound  # PEG tube   - c/w home meds   57yo F w/ pmhx of Down's syndrome, cerebral palsy, seizure disorder presents from [facility] for respiratory distress. Admitted to SDU for severe sepsis and acute hypoxemic respiratory failure likely secondary to recurrent CAP.     #Acute hypoxemic respiratory failure   #Severe sepsis with septic shock  #Possible recurrent CAP    #Metabolic encephalopathy  - CXR: possible basilar opacities  - COVID/RSV, MSRA, negative; Bl cx with contaminant  - Bcx 4/15: staph capitis  - completed course of Meropenem   - 4/22: spiked fever and WBC  - CTA chest 4/24: no PE  - repeat Bcx NGTD  - restarted Alicia per ID  - currently on 4L NC  - f/u sputum culture    #Chronic hypotension  - target MAP 60 (Patient always has Bp on the softer side)  - c/w Midodrine 5mg    #Episodes of Vomiting:   - On PEG feed   - Aspiration risk ; C Xray done   - restart the PEG feeding for now     # B/l feet ulcers  - No abscess/cellulitis  - Colonized with ESBL strain  - Off loading to prevent pressure sores   - Wound care following     #Seizure disorder  #Down syndrome  #Nonverbal bedbound  #PEG tube   - c/w home meds

## 2024-04-29 NOTE — PROGRESS NOTE ADULT - SUBJECTIVE AND OBJECTIVE BOX
SUBJECTIVE:    Patient is a 58y old Female who presents with a chief complaint of Pneumonia (28 Apr 2024 15:12)    Currently admitted to medicine with the primary diagnosis of Acute sepsis       Today is hospital day 14d. This morning she is resting comfortably in bed and reports no new issues or overnight events.     PAST MEDICAL & SURGICAL HISTORY  Down syndrome    Osteoporosis    Mild anemia    Neuropathy    S/P debridement  of R hip on 3/2/21      SOCIAL HISTORY:  Negative for smoking/alcohol/drug use.     ALLERGIES:  No Known Allergies    MEDICATIONS:  STANDING MEDICATIONS  albuterol/ipratropium for Nebulization 3 milliLiter(s) Nebulizer every 6 hours  AQUAPHOR (petrolatum Ointment) 1 Application(s) Topical two times a day  artificial  tears Solution 1 Drop(s) Both EYES two times a day  calcium carbonate   1250 mG (OsCal) 1 Tablet(s) Oral daily  chlorhexidine 2% Cloths 1 Application(s) Topical daily  enoxaparin Injectable 30 milliGRAM(s) SubCutaneous every 24 hours  famotidine    Tablet 20 milliGRAM(s) Oral two times a day  gabapentin Solution 300 milliGRAM(s) Oral two times a day  hydrocortisone 1% Cream 1 Application(s) Topical daily  levETIRAcetam  Solution 750 milliGRAM(s) Oral two times a day  meropenem  IVPB 1000 milliGRAM(s) IV Intermittent every 8 hours  midodrine 5 milliGRAM(s) Oral every 8 hours  raloxifene 60 milliGRAM(s) Oral daily  scopolamine 1 mG/72 Hr(s) Patch 1 Patch Transdermal every 72 hours  senna 2 Tablet(s) Oral at bedtime  silver sulfADIAZINE 1% Cream 1 Application(s) Topical two times a day    PRN MEDICATIONS  acetaminophen     Tablet .. 650 milliGRAM(s) Oral every 6 hours PRN  aluminum hydroxide/magnesium hydroxide/simethicone Suspension 30 milliLiter(s) Oral every 4 hours PRN  melatonin 3 milliGRAM(s) Oral at bedtime PRN  ondansetron Injectable 4 milliGRAM(s) IV Push every 8 hours PRN    VITALS:   T(F): 100.9  HR: 126  BP: 123/63  RR: 18  SpO2: 94%    LABS:                        9.6    8.24  )-----------( 561      ( 29 Apr 2024 06:44 )             31.1     04-28    138  |  100  |  15  ----------------------------<  188<H>  4.5   |  28  |  0.5<L>    Ca    8.4      28 Apr 2024 07:23  Mg     2.3     04-28        Urinalysis Basic - ( 28 Apr 2024 07:23 )    Color: x / Appearance: x / SG: x / pH: x  Gluc: 188 mg/dL / Ketone: x  / Bili: x / Urobili: x   Blood: x / Protein: x / Nitrite: x   Leuk Esterase: x / RBC: x / WBC x   Sq Epi: x / Non Sq Epi: x / Bacteria: x                RADIOLOGY:    PHYSICAL EXAM:  GEN: No acute distress  LUNGS: Clear to auscultation bilaterally   HEART: S1/S2 present. RRR.   ABD: Soft, non-tender, non-distended. Bowel sounds present  EXT: NC/NC/NE/2+PP/COHEN/Skin Intact.   NEURO: AAOX0

## 2024-04-30 LAB
ANION GAP SERPL CALC-SCNC: 13 MMOL/L — SIGNIFICANT CHANGE UP (ref 7–14)
BASOPHILS # BLD AUTO: 0.08 K/UL — SIGNIFICANT CHANGE UP (ref 0–0.2)
BASOPHILS NFR BLD AUTO: 1.2 % — HIGH (ref 0–1)
BUN SERPL-MCNC: 17 MG/DL — SIGNIFICANT CHANGE UP (ref 10–20)
CALCIUM SERPL-MCNC: 8.6 MG/DL — SIGNIFICANT CHANGE UP (ref 8.4–10.5)
CHLORIDE SERPL-SCNC: 101 MMOL/L — SIGNIFICANT CHANGE UP (ref 98–110)
CO2 SERPL-SCNC: 26 MMOL/L — SIGNIFICANT CHANGE UP (ref 17–32)
CREAT SERPL-MCNC: 0.5 MG/DL — LOW (ref 0.7–1.5)
EGFR: 109 ML/MIN/1.73M2 — SIGNIFICANT CHANGE UP
EOSINOPHIL # BLD AUTO: 0.15 K/UL — SIGNIFICANT CHANGE UP (ref 0–0.7)
EOSINOPHIL NFR BLD AUTO: 2.2 % — SIGNIFICANT CHANGE UP (ref 0–8)
GLUCOSE BLDC GLUCOMTR-MCNC: 109 MG/DL — HIGH (ref 70–99)
GLUCOSE BLDC GLUCOMTR-MCNC: 111 MG/DL — HIGH (ref 70–99)
GLUCOSE BLDC GLUCOMTR-MCNC: 98 MG/DL — SIGNIFICANT CHANGE UP (ref 70–99)
GLUCOSE SERPL-MCNC: 105 MG/DL — HIGH (ref 70–99)
HCT VFR BLD CALC: 30.2 % — LOW (ref 37–47)
HGB BLD-MCNC: 9 G/DL — LOW (ref 12–16)
IMM GRANULOCYTES NFR BLD AUTO: 1.6 % — HIGH (ref 0.1–0.3)
LYMPHOCYTES # BLD AUTO: 1.37 K/UL — SIGNIFICANT CHANGE UP (ref 1.2–3.4)
LYMPHOCYTES # BLD AUTO: 20.4 % — LOW (ref 20.5–51.1)
MAGNESIUM SERPL-MCNC: 2.6 MG/DL — HIGH (ref 1.8–2.4)
MCHC RBC-ENTMCNC: 28.4 PG — SIGNIFICANT CHANGE UP (ref 27–31)
MCHC RBC-ENTMCNC: 29.8 G/DL — LOW (ref 32–37)
MCV RBC AUTO: 95.3 FL — SIGNIFICANT CHANGE UP (ref 81–99)
MONOCYTES # BLD AUTO: 0.43 K/UL — SIGNIFICANT CHANGE UP (ref 0.1–0.6)
MONOCYTES NFR BLD AUTO: 6.4 % — SIGNIFICANT CHANGE UP (ref 1.7–9.3)
NEUTROPHILS # BLD AUTO: 4.56 K/UL — SIGNIFICANT CHANGE UP (ref 1.4–6.5)
NEUTROPHILS NFR BLD AUTO: 68.2 % — SIGNIFICANT CHANGE UP (ref 42.2–75.2)
NRBC # BLD: 0 /100 WBCS — SIGNIFICANT CHANGE UP (ref 0–0)
PLATELET # BLD AUTO: 514 K/UL — HIGH (ref 130–400)
PMV BLD: 10.6 FL — HIGH (ref 7.4–10.4)
POTASSIUM SERPL-MCNC: 4.9 MMOL/L — SIGNIFICANT CHANGE UP (ref 3.5–5)
POTASSIUM SERPL-SCNC: 4.9 MMOL/L — SIGNIFICANT CHANGE UP (ref 3.5–5)
RBC # BLD: 3.17 M/UL — LOW (ref 4.2–5.4)
RBC # FLD: 22.5 % — HIGH (ref 11.5–14.5)
SODIUM SERPL-SCNC: 140 MMOL/L — SIGNIFICANT CHANGE UP (ref 135–146)
WBC # BLD: 6.7 K/UL — SIGNIFICANT CHANGE UP (ref 4.8–10.8)
WBC # FLD AUTO: 6.7 K/UL — SIGNIFICANT CHANGE UP (ref 4.8–10.8)

## 2024-04-30 PROCEDURE — 99232 SBSQ HOSP IP/OBS MODERATE 35: CPT

## 2024-04-30 RX ADMIN — Medication 650 MILLIGRAM(S): at 06:56

## 2024-04-30 RX ADMIN — ENOXAPARIN SODIUM 30 MILLIGRAM(S): 100 INJECTION SUBCUTANEOUS at 13:16

## 2024-04-30 RX ADMIN — Medication 1 APPLICATION(S): at 13:55

## 2024-04-30 RX ADMIN — Medication 650 MILLIGRAM(S): at 01:14

## 2024-04-30 RX ADMIN — SCOPALAMINE 1 PATCH: 1 PATCH, EXTENDED RELEASE TRANSDERMAL at 06:55

## 2024-04-30 RX ADMIN — GABAPENTIN 300 MILLIGRAM(S): 400 CAPSULE ORAL at 07:10

## 2024-04-30 RX ADMIN — Medication 1 APPLICATION(S): at 06:59

## 2024-04-30 RX ADMIN — LEVETIRACETAM 750 MILLIGRAM(S): 250 TABLET, FILM COATED ORAL at 06:57

## 2024-04-30 RX ADMIN — GABAPENTIN 300 MILLIGRAM(S): 400 CAPSULE ORAL at 17:55

## 2024-04-30 RX ADMIN — RALOXIFENE HYDROCHLORIDE 60 MILLIGRAM(S): 60 TABLET, COATED ORAL at 13:15

## 2024-04-30 RX ADMIN — Medication 3 MILLILITER(S): at 07:38

## 2024-04-30 RX ADMIN — LEVETIRACETAM 750 MILLIGRAM(S): 250 TABLET, FILM COATED ORAL at 17:53

## 2024-04-30 RX ADMIN — FAMOTIDINE 20 MILLIGRAM(S): 10 INJECTION INTRAVENOUS at 17:53

## 2024-04-30 RX ADMIN — MIDODRINE HYDROCHLORIDE 5 MILLIGRAM(S): 2.5 TABLET ORAL at 22:03

## 2024-04-30 RX ADMIN — Medication 1 TABLET(S): at 13:16

## 2024-04-30 RX ADMIN — MEROPENEM 100 MILLIGRAM(S): 1 INJECTION INTRAVENOUS at 06:56

## 2024-04-30 RX ADMIN — Medication 1 APPLICATION(S): at 17:56

## 2024-04-30 RX ADMIN — SENNA PLUS 2 TABLET(S): 8.6 TABLET ORAL at 22:03

## 2024-04-30 RX ADMIN — MIDODRINE HYDROCHLORIDE 5 MILLIGRAM(S): 2.5 TABLET ORAL at 13:19

## 2024-04-30 RX ADMIN — MIDODRINE HYDROCHLORIDE 5 MILLIGRAM(S): 2.5 TABLET ORAL at 06:56

## 2024-04-30 RX ADMIN — FAMOTIDINE 20 MILLIGRAM(S): 10 INJECTION INTRAVENOUS at 06:57

## 2024-04-30 RX ADMIN — Medication 3 MILLILITER(S): at 14:18

## 2024-04-30 RX ADMIN — CHLORHEXIDINE GLUCONATE 1 APPLICATION(S): 213 SOLUTION TOPICAL at 12:14

## 2024-04-30 RX ADMIN — Medication 1 APPLICATION(S): at 17:54

## 2024-04-30 RX ADMIN — Medication 1 APPLICATION(S): at 06:58

## 2024-04-30 RX ADMIN — Medication 1 DROP(S): at 06:57

## 2024-04-30 RX ADMIN — Medication 3 MILLILITER(S): at 19:45

## 2024-04-30 RX ADMIN — Medication 1 DROP(S): at 17:54

## 2024-04-30 NOTE — PROGRESS NOTE ADULT - SUBJECTIVE AND OBJECTIVE BOX
SUBJECTIVE:    Patient is a 58y old Female who presents with a chief complaint of Pneumonia (29 Apr 2024 14:20)    Currently admitted to medicine with the primary diagnosis of Acute sepsis       Today is hospital day 15d. This morning she is resting comfortably in bed and reports no new issues or overnight events.     PAST MEDICAL & SURGICAL HISTORY  Down syndrome    Osteoporosis    Mild anemia    Neuropathy    S/P debridement  of R hip on 3/2/21      SOCIAL HISTORY:  Negative for smoking/alcohol/drug use.     ALLERGIES:  No Known Allergies    MEDICATIONS:  STANDING MEDICATIONS  albuterol/ipratropium for Nebulization 3 milliLiter(s) Nebulizer every 6 hours  AQUAPHOR (petrolatum Ointment) 1 Application(s) Topical two times a day  artificial  tears Solution 1 Drop(s) Both EYES two times a day  calcium carbonate   1250 mG (OsCal) 1 Tablet(s) Oral daily  chlorhexidine 2% Cloths 1 Application(s) Topical daily  enoxaparin Injectable 30 milliGRAM(s) SubCutaneous every 24 hours  famotidine    Tablet 20 milliGRAM(s) Oral two times a day  gabapentin Solution 300 milliGRAM(s) Oral two times a day  hydrocortisone 1% Cream 1 Application(s) Topical daily  levETIRAcetam  Solution 750 milliGRAM(s) Oral two times a day  midodrine 5 milliGRAM(s) Oral every 8 hours  raloxifene 60 milliGRAM(s) Oral daily  scopolamine 1 mG/72 Hr(s) Patch 1 Patch Transdermal every 72 hours  senna 2 Tablet(s) Oral at bedtime  silver sulfADIAZINE 1% Cream 1 Application(s) Topical two times a day    PRN MEDICATIONS  acetaminophen     Tablet .. 650 milliGRAM(s) Oral every 6 hours PRN  aluminum hydroxide/magnesium hydroxide/simethicone Suspension 30 milliLiter(s) Oral every 4 hours PRN  melatonin 3 milliGRAM(s) Oral at bedtime PRN  ondansetron Injectable 4 milliGRAM(s) IV Push every 8 hours PRN    VITALS:   T(F): 96.8  HR: 103  BP: 89/53  RR: 18  SpO2: 100%    LABS:                        9.0    6.70  )-----------( 514      ( 30 Apr 2024 07:50 )             30.2     04-30    140  |  101  |  17  ----------------------------<  105<H>  4.9   |  26  |  0.5<L>    Ca    8.6      30 Apr 2024 07:50  Mg     2.6     04-30        Urinalysis Basic - ( 30 Apr 2024 07:50 )    Color: x / Appearance: x / SG: x / pH: x  Gluc: 105 mg/dL / Ketone: x  / Bili: x / Urobili: x   Blood: x / Protein: x / Nitrite: x   Leuk Esterase: x / RBC: x / WBC x   Sq Epi: x / Non Sq Epi: x / Bacteria: x            Culture - Blood (collected 28 Apr 2024 07:23)  Source: .Blood None  Preliminary Report (29 Apr 2024 17:02):    No growth at 24 hours          RADIOLOGY:    PHYSICAL EXAM:  GEN: No acute distress  LUNGS: Clear to auscultation bilaterally   HEART: S1/S2 present. RRR.   ABD: Soft, non-tender, non-distended. Bowel sounds present  EXT: NC/NC/NE/2+PP/COHEN/Skin Intact.   NEURO: AAOX0

## 2024-04-30 NOTE — PROGRESS NOTE ADULT - SUBJECTIVE AND OBJECTIVE BOX
TEA ECHAVARRIA  58y  Female      Patient is a 58y old  Female who presents with a chief complaint of Pneumonia.     INTERVAL HPI/OVERNIGHT EVENTS:      ******************************* REVIEW OF SYSTEMS:**********************************************  All other review of systems negative    *********************** VITALS ******************************************    T(F): 97.8 (04-30-24 @ 12:31)  HR: 85 (04-30-24 @ 12:31) (85 - 129)  BP: 92/50 (04-30-24 @ 12:31) (89/53 - 93/52)  RR: 18 (04-30-24 @ 12:31) (18 - 20)  SpO2: 100% (04-30-24 @ 12:31) (92% - 100%)    04-29-24 @ 07:01  -  04-30-24 @ 07:00  --------------------------------------------------------  IN: 290 mL / OUT: 0 mL / NET: 290 mL            04-29-24 @ 07:01  -  04-30-24 @ 07:00  --------------------------------------------------------  IN: 290 mL / OUT: 0 mL / NET: 290 mL        ******************************** PHYSICAL EXAM:**************************************************  GENERAL: NAD    PSYCH: no agitation,  HEENT:     NERVOUS SYSTEM:  Alert & awake x 1-2    PULMONARY: MARIE,     CARDIOVASCULAR: S1S2 RRR    GI: Soft, NT, ND; BS present. PEG     EXTREMITIES:  2+ Peripheral Pulses, No clubbing, cyanosis, or edema    LYMPH: No lymphadenopathy noted    SKIN: No rashes or lesions      **************************** LABS *******************************************************                          9.0    6.70  )-----------( 514      ( 30 Apr 2024 07:50 )             30.2     04-30    140  |  101  |  17  ----------------------------<  105<H>  4.9   |  26  |  0.5<L>    Ca    8.6      30 Apr 2024 07:50  Mg     2.6     04-30        Urinalysis Basic - ( 30 Apr 2024 07:50 )    Color: x / Appearance: x / SG: x / pH: x  Gluc: 105 mg/dL / Ketone: x  / Bili: x / Urobili: x   Blood: x / Protein: x / Nitrite: x   Leuk Esterase: x / RBC: x / WBC x   Sq Epi: x / Non Sq Epi: x / Bacteria: x        Lactate Trend        CAPILLARY BLOOD GLUCOSE      POCT Blood Glucose.: 98 mg/dL (30 Apr 2024 11:27)          **************************Active Medications *******************************************  No Known Allergies      acetaminophen     Tablet .. 650 milliGRAM(s) Oral every 6 hours PRN  albuterol/ipratropium for Nebulization 3 milliLiter(s) Nebulizer every 6 hours  aluminum hydroxide/magnesium hydroxide/simethicone Suspension 30 milliLiter(s) Oral every 4 hours PRN  AQUAPHOR (petrolatum Ointment) 1 Application(s) Topical two times a day  artificial  tears Solution 1 Drop(s) Both EYES two times a day  calcium carbonate   1250 mG (OsCal) 1 Tablet(s) Oral daily  chlorhexidine 2% Cloths 1 Application(s) Topical daily  enoxaparin Injectable 30 milliGRAM(s) SubCutaneous every 24 hours  famotidine    Tablet 20 milliGRAM(s) Oral two times a day  gabapentin Solution 300 milliGRAM(s) Oral two times a day  hydrocortisone 1% Cream 1 Application(s) Topical daily  levETIRAcetam  Solution 750 milliGRAM(s) Oral two times a day  melatonin 3 milliGRAM(s) Oral at bedtime PRN  midodrine 5 milliGRAM(s) Oral every 8 hours  ondansetron Injectable 4 milliGRAM(s) IV Push every 8 hours PRN  raloxifene 60 milliGRAM(s) Oral daily  scopolamine 1 mG/72 Hr(s) Patch 1 Patch Transdermal every 72 hours  senna 2 Tablet(s) Oral at bedtime  silver sulfADIAZINE 1% Cream 1 Application(s) Topical two times a day      ***************************************************  RADIOLOGY & ADDITIONAL TESTS:    Imaging Personally Reviewed:  [ ] YES  [ ] NO    HEALTH ISSUES - PROBLEM Dx:  Septic shock    Acute respiratory failure with hypoxia    Advanced care planning/counseling discussion    Encounter for palliative care

## 2024-04-30 NOTE — PROGRESS NOTE ADULT - ASSESSMENT
59yo F w/ pmhx of Down's syndrome, cerebral palsy, seizure disorder presents from [facility] for respiratory distress. Admitted to SDU for severe sepsis and acute hypoxemic respiratory failure likely secondary to recurrent CAP.     # Acute hypoxemic respiratory failure   #Severe sepsis with septic shock  #possible recurrent CAP   #chronic hypotension   - CXR: possible basilar opacities  - covid/RSV, MSRA, negative; Bl cx with contaminant   - C/w Midodrine 5mg  - target MAP 60 ( Patient always has BP on the softer side )   - blood cx 4/15: staph capitis  - blood cx 4/15 and 4/17: NGTD  - Scopolamine for secretions. aspiration precautions   - Palliative care following   - 4/22 and 4/23: spiked fever and WBC ;WBC downtrending (10 on 4/26)  - Continue Meropenem (4/15 - )  - F/u bcx 4/23 -> no growth to date  - CT chest angio w/ contrast 4/24: bilateral opacities   - currently on 4L facemask  - 4/27 AM temp 101  - 4/28 >> Tmax 102.2   - 4/29 >> Tmax 99.4  -  fungitell  >> nEG   -Blood cx >> NGTD 04/28  - Follow up ID    #Episodes of Vomiting:   - On PEG feed   - Aspiration risk ; C Xray done   - Can restart the PEG feeding for now     # B/l feet ulcers  - No abscess/cellulitis  - colonized with ESBL strain  - Off loading to prevent pressure sores   - wound care following     #seizure disorder  #Down syndrome  #Nonverbal bedbound  # PEG tube   - c/w home meds    Pending: Fever curve/  ID   Dispo:

## 2024-04-30 NOTE — PROGRESS NOTE ADULT - ASSESSMENT
59yo F w/ pmhx of Down's syndrome, cerebral palsy, seizure disorder presents from [facility] for respiratory distress. Admitted to SDU for severe sepsis and acute hypoxemic respiratory failure likely secondary to recurrent CAP.     #Acute hypoxemic respiratory failure   #Severe sepsis with septic shock  #Possible recurrent CAP    #Metabolic encephalopathy  - CXR: possible basilar opacities  - COVID/RSV, MSRA, negative; Bl cx with contaminant  - Bcx 4/15: staph capitis  - completed course of Meropenem   - 4/22: spiked fever and WBC  - CTA chest 4/24: no PE  - repeat Bcx NGTD. Fungitell negative  - restarted Alicia per ID  - currently on 4L NC  - sputum culture unable to collect  - f/u ID    #Chronic hypotension  - target MAP 60 (Patient always has Bp on the softer side)  - c/w Midodrine 5mg    #Episodes of Vomiting:   - On PEG feed   - Aspiration risk ; C Xray done   - restart the PEG feeding for now     # B/l feet ulcers  - No abscess/cellulitis  - Colonized with ESBL strain  - Off loading to prevent pressure sores   - Wound care following     #Seizure disorder  #Down syndrome  #Nonverbal bedbound  #PEG tube   - c/w home meds

## 2024-05-01 ENCOUNTER — APPOINTMENT (OUTPATIENT)
Dept: GASTROENTEROLOGY | Facility: CLINIC | Age: 59
End: 2024-05-01

## 2024-05-01 LAB
ANION GAP SERPL CALC-SCNC: 14 MMOL/L — SIGNIFICANT CHANGE UP (ref 7–14)
BASOPHILS # BLD AUTO: 0.09 K/UL — SIGNIFICANT CHANGE UP (ref 0–0.2)
BASOPHILS NFR BLD AUTO: 1 % — SIGNIFICANT CHANGE UP (ref 0–1)
BUN SERPL-MCNC: 19 MG/DL — SIGNIFICANT CHANGE UP (ref 10–20)
CALCIUM SERPL-MCNC: 8.8 MG/DL — SIGNIFICANT CHANGE UP (ref 8.4–10.4)
CHLORIDE SERPL-SCNC: 99 MMOL/L — SIGNIFICANT CHANGE UP (ref 98–110)
CO2 SERPL-SCNC: 27 MMOL/L — SIGNIFICANT CHANGE UP (ref 17–32)
CREAT SERPL-MCNC: 0.5 MG/DL — LOW (ref 0.7–1.5)
EGFR: 109 ML/MIN/1.73M2 — SIGNIFICANT CHANGE UP
EOSINOPHIL # BLD AUTO: 0.3 K/UL — SIGNIFICANT CHANGE UP (ref 0–0.7)
EOSINOPHIL NFR BLD AUTO: 3.2 % — SIGNIFICANT CHANGE UP (ref 0–8)
GLUCOSE BLDC GLUCOMTR-MCNC: 114 MG/DL — HIGH (ref 70–99)
GLUCOSE BLDC GLUCOMTR-MCNC: 123 MG/DL — HIGH (ref 70–99)
GLUCOSE BLDC GLUCOMTR-MCNC: 125 MG/DL — HIGH (ref 70–99)
GLUCOSE BLDC GLUCOMTR-MCNC: 127 MG/DL — HIGH (ref 70–99)
GLUCOSE BLDC GLUCOMTR-MCNC: 133 MG/DL — HIGH (ref 70–99)
GLUCOSE BLDC GLUCOMTR-MCNC: 143 MG/DL — HIGH (ref 70–99)
GLUCOSE BLDC GLUCOMTR-MCNC: 151 MG/DL — HIGH (ref 70–99)
GLUCOSE SERPL-MCNC: 125 MG/DL — HIGH (ref 70–99)
HCT VFR BLD CALC: 31.7 % — LOW (ref 37–47)
HGB BLD-MCNC: 9.7 G/DL — LOW (ref 12–16)
IMM GRANULOCYTES NFR BLD AUTO: 1.6 % — HIGH (ref 0.1–0.3)
LYMPHOCYTES # BLD AUTO: 1.36 K/UL — SIGNIFICANT CHANGE UP (ref 1.2–3.4)
LYMPHOCYTES # BLD AUTO: 14.5 % — LOW (ref 20.5–51.1)
MAGNESIUM SERPL-MCNC: 2.5 MG/DL — HIGH (ref 1.8–2.4)
MCHC RBC-ENTMCNC: 28.6 PG — SIGNIFICANT CHANGE UP (ref 27–31)
MCHC RBC-ENTMCNC: 30.6 G/DL — LOW (ref 32–37)
MCV RBC AUTO: 93.5 FL — SIGNIFICANT CHANGE UP (ref 81–99)
MONOCYTES # BLD AUTO: 0.45 K/UL — SIGNIFICANT CHANGE UP (ref 0.1–0.6)
MONOCYTES NFR BLD AUTO: 4.8 % — SIGNIFICANT CHANGE UP (ref 1.7–9.3)
NEUTROPHILS # BLD AUTO: 7.06 K/UL — HIGH (ref 1.4–6.5)
NEUTROPHILS NFR BLD AUTO: 74.9 % — SIGNIFICANT CHANGE UP (ref 42.2–75.2)
NRBC # BLD: 0 /100 WBCS — SIGNIFICANT CHANGE UP (ref 0–0)
PLATELET # BLD AUTO: 622 K/UL — HIGH (ref 130–400)
PMV BLD: 10.4 FL — SIGNIFICANT CHANGE UP (ref 7.4–10.4)
POTASSIUM SERPL-MCNC: 4.8 MMOL/L — SIGNIFICANT CHANGE UP (ref 3.5–5)
POTASSIUM SERPL-SCNC: 4.8 MMOL/L — SIGNIFICANT CHANGE UP (ref 3.5–5)
RBC # BLD: 3.39 M/UL — LOW (ref 4.2–5.4)
RBC # FLD: 21.6 % — HIGH (ref 11.5–14.5)
SODIUM SERPL-SCNC: 140 MMOL/L — SIGNIFICANT CHANGE UP (ref 135–146)
WBC # BLD: 9.41 K/UL — SIGNIFICANT CHANGE UP (ref 4.8–10.8)
WBC # FLD AUTO: 9.41 K/UL — SIGNIFICANT CHANGE UP (ref 4.8–10.8)

## 2024-05-01 PROCEDURE — 99232 SBSQ HOSP IP/OBS MODERATE 35: CPT

## 2024-05-01 PROCEDURE — 71045 X-RAY EXAM CHEST 1 VIEW: CPT | Mod: 26

## 2024-05-01 RX ADMIN — Medication 1 APPLICATION(S): at 18:35

## 2024-05-01 RX ADMIN — GABAPENTIN 300 MILLIGRAM(S): 400 CAPSULE ORAL at 18:34

## 2024-05-01 RX ADMIN — ENOXAPARIN SODIUM 30 MILLIGRAM(S): 100 INJECTION SUBCUTANEOUS at 13:44

## 2024-05-01 RX ADMIN — Medication 1 APPLICATION(S): at 05:32

## 2024-05-01 RX ADMIN — FAMOTIDINE 20 MILLIGRAM(S): 10 INJECTION INTRAVENOUS at 05:33

## 2024-05-01 RX ADMIN — MIDODRINE HYDROCHLORIDE 5 MILLIGRAM(S): 2.5 TABLET ORAL at 13:44

## 2024-05-01 RX ADMIN — SENNA PLUS 2 TABLET(S): 8.6 TABLET ORAL at 22:08

## 2024-05-01 RX ADMIN — GABAPENTIN 300 MILLIGRAM(S): 400 CAPSULE ORAL at 05:33

## 2024-05-01 RX ADMIN — Medication 3 MILLILITER(S): at 14:23

## 2024-05-01 RX ADMIN — Medication 1 TABLET(S): at 12:04

## 2024-05-01 RX ADMIN — Medication 1 DROP(S): at 18:34

## 2024-05-01 RX ADMIN — Medication 1 APPLICATION(S): at 13:44

## 2024-05-01 RX ADMIN — Medication 1 DROP(S): at 05:33

## 2024-05-01 RX ADMIN — LEVETIRACETAM 750 MILLIGRAM(S): 250 TABLET, FILM COATED ORAL at 18:34

## 2024-05-01 RX ADMIN — RALOXIFENE HYDROCHLORIDE 60 MILLIGRAM(S): 60 TABLET, COATED ORAL at 12:04

## 2024-05-01 RX ADMIN — LEVETIRACETAM 750 MILLIGRAM(S): 250 TABLET, FILM COATED ORAL at 05:33

## 2024-05-01 RX ADMIN — Medication 3 MILLILITER(S): at 08:00

## 2024-05-01 RX ADMIN — MIDODRINE HYDROCHLORIDE 5 MILLIGRAM(S): 2.5 TABLET ORAL at 05:33

## 2024-05-01 RX ADMIN — MIDODRINE HYDROCHLORIDE 5 MILLIGRAM(S): 2.5 TABLET ORAL at 22:08

## 2024-05-01 RX ADMIN — FAMOTIDINE 20 MILLIGRAM(S): 10 INJECTION INTRAVENOUS at 18:36

## 2024-05-01 RX ADMIN — CHLORHEXIDINE GLUCONATE 1 APPLICATION(S): 213 SOLUTION TOPICAL at 14:06

## 2024-05-01 NOTE — PROGRESS NOTE ADULT - ASSESSMENT
· Assessment	  58 MARÍA w a PMH of Trisomy 21, a recent admission [id number] for RSV PNA w MICU stay (never intubated), nonverbal at baseline, hypotension, cerebral palsey w seizure disorder, ESBL isolated from ulcer of rt foot 11/23 BIBEMS from [facility] for respiratory distress and high fever. Patient SOA w , RR 22 and febrile to 102.8 rectally. Patient also hypotensive to 82/51. Labs notable for leukocytosis of 12.25 w 3.6% bands, large platelets and a compensated Respiratory acidosis. Imaging notable for a Rt Mid lobe ground glass opacification. Patient MRSA Negative w RVP negative for COVID, RSV and Flu.     IMPRESSION/RECOMMENDATIONS  Aspiration with chemical pneumonitis  4/28 BCx NG  CXR hazy RUL  WBC 9.4  4/24 CT no PE, multifocal opacities  4/17,19,21, 23 BCx NG  Immunosuppression could result in poor clinical outcome.   Nares ORSA NG  RVP NG  4/15 Bcx 1/2 CoNS > not a true pathogen  Was colonized with ESBL strain in feet wounds  Nares ORSA NG  COVID 19/ Influenza/ RSV NG.   WBC 16.6  Feet with pressure related ulcers. No abscess/cellulitis    -hold off on Abx. Has a long enough course  -Off loading to prevent pressure sores and preventive measures to avoid aspiration     Please do not hesitate to recall ID if any questions arise either through [facility] or through [facility] teams

## 2024-05-01 NOTE — PROGRESS NOTE ADULT - ASSESSMENT
59yo F w/ pmhx of Down's syndrome, cerebral palsy, seizure disorder presents from [facility] for respiratory distress. Admitted to SDU for severe sepsis and acute hypoxemic respiratory failure likely secondary to recurrent CAP.     #Acute hypoxemic respiratory failure   #Severe sepsis with septic shock  #Possible recurrent CAP    #Metabolic encephalopathy  - CXR: possible basilar opacities  - COVID/RSV, MSRA, negative; Bl cx with contaminant  - Bcx 4/15: staph capitis  - completed course of Meropenem   - 4/22: spiked fever and WBC  - CTA chest 4/24: no PE  - repeat Bcx NGTD. Fungitell negative  - completed second course of Alicia  - currently on 4L NC  - sputum culture unable to collect  - f/u ID    #Chronic hypotension  - target MAP 60 (Patient always has Bp on the softer side)  - c/w Midodrine 5mg    #Episodes of Vomiting:   - On PEG feed   - Aspiration risk ; C Xray done   - c/w PEG feeding    # B/l feet ulcers  - Colonized with ESBL strain  - Off loading to prevent pressure sores   - Wound care following     #Seizure disorder  #Down syndrome  #Nonverbal bedbound  #PEG tube   - c/w home meds

## 2024-05-01 NOTE — PROGRESS NOTE ADULT - SUBJECTIVE AND OBJECTIVE BOX
SUBJECTIVE:    Patient is a 58y old Female who presents with a chief complaint of Pneumonia (30 Apr 2024 14:11)    Currently admitted to medicine with the primary diagnosis of Acute sepsis       Today is hospital day 16d. This morning she is resting comfortably in bed and reports no new issues or overnight events.     PAST MEDICAL & SURGICAL HISTORY  Down syndrome    Osteoporosis    Mild anemia    Neuropathy    S/P debridement  of R hip on 3/2/21      SOCIAL HISTORY:  Negative for smoking/alcohol/drug use.     ALLERGIES:  No Known Allergies    MEDICATIONS:  STANDING MEDICATIONS  albuterol/ipratropium for Nebulization 3 milliLiter(s) Nebulizer every 6 hours  AQUAPHOR (petrolatum Ointment) 1 Application(s) Topical two times a day  artificial  tears Solution 1 Drop(s) Both EYES two times a day  calcium carbonate   1250 mG (OsCal) 1 Tablet(s) Oral daily  chlorhexidine 2% Cloths 1 Application(s) Topical daily  enoxaparin Injectable 30 milliGRAM(s) SubCutaneous every 24 hours  famotidine    Tablet 20 milliGRAM(s) Oral two times a day  gabapentin Solution 300 milliGRAM(s) Oral two times a day  hydrocortisone 1% Cream 1 Application(s) Topical daily  levETIRAcetam  Solution 750 milliGRAM(s) Oral two times a day  midodrine 5 milliGRAM(s) Oral every 8 hours  raloxifene 60 milliGRAM(s) Oral daily  scopolamine 1 mG/72 Hr(s) Patch 1 Patch Transdermal every 72 hours  senna 2 Tablet(s) Oral at bedtime  silver sulfADIAZINE 1% Cream 1 Application(s) Topical two times a day    PRN MEDICATIONS  acetaminophen     Tablet .. 650 milliGRAM(s) Oral every 6 hours PRN  aluminum hydroxide/magnesium hydroxide/simethicone Suspension 30 milliLiter(s) Oral every 4 hours PRN  melatonin 3 milliGRAM(s) Oral at bedtime PRN  ondansetron Injectable 4 milliGRAM(s) IV Push every 8 hours PRN    VITALS:   T(F): 98.3  HR: 103  BP: 97/59  RR: 19  SpO2: 99%    LABS:                        9.7    9.41  )-----------( 622      ( 01 May 2024 07:14 )             31.7     05-01    140  |  99  |  19  ----------------------------<  125<H>  4.8   |  27  |  0.5<L>    Ca    8.8      01 May 2024 07:14  Mg     2.5     05-01        Urinalysis Basic - ( 01 May 2024 07:14 )    Color: x / Appearance: x / SG: x / pH: x  Gluc: 125 mg/dL / Ketone: x  / Bili: x / Urobili: x   Blood: x / Protein: x / Nitrite: x   Leuk Esterase: x / RBC: x / WBC x   Sq Epi: x / Non Sq Epi: x / Bacteria: x                RADIOLOGY:    PHYSICAL EXAM:  GEN: ill appearing  LUNGS: Clear to auscultation bilaterally   HEART: S1/S2 present. RRR.   ABD: Soft, non-tender, non-distended. Bowel sounds present  EXT: NC/NC/NE/2+PP/COHEN/Skin Intact.   NEURO: AAOX0   SUBJECTIVE:    Patient is a 58y old Female who presents with a chief complaint of Pneumonia (30 Apr 2024 14:11)    Currently admitted to medicine with the primary diagnosis of Acute sepsis       Today is hospital day 16d. This morning she is resting comfortably in bed and reports no new issues or overnight events.     PAST MEDICAL & SURGICAL HISTORY  Down syndrome  Osteoporosis  Mild anemia  Neuropathy  S/P debridement  of R hip on 3/2/21    SOCIAL HISTORY:  Negative for smoking/alcohol/drug use.     ALLERGIES:  No Known Allergies    MEDICATIONS:  STANDING MEDICATIONS  albuterol/ipratropium for Nebulization 3 milliLiter(s) Nebulizer every 6 hours  AQUAPHOR (petrolatum Ointment) 1 Application(s) Topical two times a day  artificial  tears Solution 1 Drop(s) Both EYES two times a day  calcium carbonate   1250 mG (OsCal) 1 Tablet(s) Oral daily  chlorhexidine 2% Cloths 1 Application(s) Topical daily  enoxaparin Injectable 30 milliGRAM(s) SubCutaneous every 24 hours  famotidine    Tablet 20 milliGRAM(s) Oral two times a day  gabapentin Solution 300 milliGRAM(s) Oral two times a day  hydrocortisone 1% Cream 1 Application(s) Topical daily  levETIRAcetam  Solution 750 milliGRAM(s) Oral two times a day  midodrine 5 milliGRAM(s) Oral every 8 hours  raloxifene 60 milliGRAM(s) Oral daily  scopolamine 1 mG/72 Hr(s) Patch 1 Patch Transdermal every 72 hours  senna 2 Tablet(s) Oral at bedtime  silver sulfADIAZINE 1% Cream 1 Application(s) Topical two times a day    PRN MEDICATIONS  acetaminophen     Tablet .. 650 milliGRAM(s) Oral every 6 hours PRN  aluminum hydroxide/magnesium hydroxide/simethicone Suspension 30 milliLiter(s) Oral every 4 hours PRN  melatonin 3 milliGRAM(s) Oral at bedtime PRN  ondansetron Injectable 4 milliGRAM(s) IV Push every 8 hours PRN    VITALS:   T(F): 98.3  HR: 103  BP: 97/59  RR: 19  SpO2: 99%    LABS:                        9.7    9.41  )-----------( 622      ( 01 May 2024 07:14 )             31.7     05-01    140  |  99  |  19  ----------------------------<  125<H>  4.8   |  27  |  0.5<L>    Ca    8.8      01 May 2024 07:14  Mg     2.5     05-01        Urinalysis Basic - ( 01 May 2024 07:14 )    Color: x / Appearance: x / SG: x / pH: x  Gluc: 125 mg/dL / Ketone: x  / Bili: x / Urobili: x   Blood: x / Protein: x / Nitrite: x   Leuk Esterase: x / RBC: x / WBC x   Sq Epi: x / Non Sq Epi: x / Bacteria: x    PHYSICAL EXAM:  GEN: ill appearing  LUNGS: Clear to auscultation bilaterally   HEART: S1/S2 present. RRR.   ABD: Soft, non-tender, non-distended. Bowel sounds present  EXT: NC/NC/NE/2+PP/COHEN/Skin Intact.   NEURO: AAOX0

## 2024-05-01 NOTE — CHART NOTE - NSCHARTNOTEFT_GEN_A_CORE
Registered Dietitian Follow-Up     Patient Profile Reviewed                           Yes [x]   No []     Pertinent Subjective Information:  RD Spoke with RN - patient is tolerating tube feeds well. 1 BM overnight.     Pertinent Medical Interventions:  Acute hypoxemic respiratory failure; Severe sepsis with septic shock; possible recurrent CAP; metabolic encephalopathy; chronic hypotension; episodes of vomiting  - On PEG feed, Aspiration risk - c/w EG feeding; b/l feet ulcers; Seizure disorder; down syndrome; nonverbal bedbound; PEG tube     Diet order:   Diet, NPO:   Tube Feeding Modality: Gastrostomy  Jevity 1.2 Juventino (JEVITY1.2RTH)  Total Volume for 24 Hours (mL): 450  Bolus  Total # of Feeds: 3  Tube Feed Frequency: Every 8 hours   Tube Feed Start Time: 00:00  Bolus Feed Rate (mL per Hour): 150   Bolus Feed Duration (in Hours): 1  Bolus Feed Instructions:  ADDITIONAL FLUSHES: 70CC PRE/POST FEEDS   Total Volume of Bolus (mL):  150 (04-26-24 @ 09:01) [Active]    Anthropometrics:  Height: 134 cm   Weight: 42.7 kg   BMI: 23.8     MEDICATIONS  (STANDING):  albuterol/ipratropium for Nebulization 3 milliLiter(s) Nebulizer every 6 hours  AQUAPHOR (petrolatum Ointment) 1 Application(s) Topical two times a day  artificial  tears Solution 1 Drop(s) Both EYES two times a day  calcium carbonate   1250 mG (OsCal) 1 Tablet(s) Oral daily  chlorhexidine 2% Cloths 1 Application(s) Topical daily  enoxaparin Injectable 30 milliGRAM(s) SubCutaneous every 24 hours  famotidine    Tablet 20 milliGRAM(s) Oral two times a day  gabapentin Solution 300 milliGRAM(s) Oral two times a day  hydrocortisone 1% Cream 1 Application(s) Topical daily  levETIRAcetam  Solution 750 milliGRAM(s) Oral two times a day  midodrine 5 milliGRAM(s) Oral every 8 hours  raloxifene 60 milliGRAM(s) Oral daily  scopolamine 1 mG/72 Hr(s) Patch 1 Patch Transdermal every 72 hours  senna 2 Tablet(s) Oral at bedtime  silver sulfADIAZINE 1% Cream 1 Application(s) Topical two times a day    MEDICATIONS  (PRN):  acetaminophen     Tablet .. 650 milliGRAM(s) Oral every 6 hours PRN Temp greater or equal to 38C (100.4F), Mild Pain (1 - 3)  aluminum hydroxide/magnesium hydroxide/simethicone Suspension 30 milliLiter(s) Oral every 4 hours PRN Dyspepsia  melatonin 3 milliGRAM(s) Oral at bedtime PRN Insomnia  ondansetron Injectable 4 milliGRAM(s) IV Push every 8 hours PRN Nausea and/or Vomiting    Pertinent Labs: 05-01 @ 07:14: Na 140, BUN 19, Cr 0.5<L>, <H>, K+ 4.8, Phos --, Mg 2.5<H>, Alk Phos --, ALT/SGPT --, AST/SGOT --, HbA1c --    Finger Sticks:  POCT Blood Glucose.: 133 mg/dL (05-01 @ 11:11)  POCT Blood Glucose.: 125 mg/dL (05-01 @ 07:59)  POCT Blood Glucose.: 151 mg/dL (05-01 @ 05:28)  POCT Blood Glucose.: 111 mg/dL (04-30 @ 21:20)  POCT Blood Glucose.: 109 mg/dL (04-30 @ 17:01)    Physical Findings:  - Appearance: disoriented x 4  - GI function: last BM 4/28   - Tubes: +PEG tube   - Oral/Mouth cavity: NPO with EN via PEG   - Skin: R heel - unstageable Pressure Injury per WOCN note 4/15  -Edema: 2+ edema - left and right hand      Nutrition Requirements  Weight Used: 44.8 kg - per RD assessment (4/20)      Estimated Energy Needs    Continue []  Adjust [x]  1013-1266kcal/day (using 1.2-1.5AF due to PI vs. suspected PCM)     Estimated Protein Needs    Continue [x]  Adjust []   54-68g/day (using 1.2-1.5g/kg -- same reason as above)      Estimated Fluid Needs        Continue [x]  Adjust []  1344 - 1568 mL/day (30 - 35 mL/kg)      Nutrient Intake: EN regimen with Jevity 1.2 provides 1350 mL total volume, 1620 kcal, 74.9 gm Protein, 1093.5 mL free water + recommend 70 mL flushes pre/post feeds = 1653.5 mL total water     [x] Previous Nutrition Diagnosis:    Unintended weight loss             [x] Ongoing          [] Resolved     Nutrition Education: deferred; not appropriate at this time      Goal/Expected Outcome: Patient will meet >85% and <105% of estimated nutrient needs within 3-5 days      Indicator/Monitoring: EN tolerance, BM, GI s/s, weight, labs, skin status, NFPE     Recommendation:   1) Would Recommend to change diet order to Jevity 1.2 360 mL q8hrs - as current TF regimen far exceeds estimated nutrient needs (meets 128% of estimated kcal needs and 110% protein needs)  - should not exceed 105% of estimated needs     -Jevity 1.2 at 360 mL q8hrs would provide: 1080 mL total volume, 1296 kcal, 60 gm Protein, 875 mL free water from formula + recommend 70 mL water flushes pre/post feeds    2) Monitor BM, GI s/s   3) Monitor electrolytes, BG, renal profile    Patient is at moderate nutrition risk, RD to f/u in 5-7 days or PRN   RD to remain available: Tania Mclean RD [phone] or via Teams

## 2024-05-01 NOTE — PROGRESS NOTE ADULT - SUBJECTIVE AND OBJECTIVE BOX
TEA ECHAVARRIA  58y, Female    All available historical data reviewed    OVERNIGHT EVENTS:  no fevers      ROS:  unable to obtain history secondary to patient's mental status     VITALS:  T(F): 98.3, Max: 98.3 (04-30-24 @ 20:00)  HR: 103  BP: 97/59  RR: 19Vital Signs Last 24 Hrs  T(C): 36.8 (30 Apr 2024 20:00), Max: 36.8 (30 Apr 2024 20:00)  T(F): 98.3 (30 Apr 2024 20:00), Max: 98.3 (30 Apr 2024 20:00)  HR: 103 (01 May 2024 05:08) (85 - 103)  BP: 97/59 (01 May 2024 05:08) (92/50 - 97/59)  BP(mean): --  RR: 19 (01 May 2024 05:08) (18 - 19)  SpO2: 99% (01 May 2024 05:08) (94% - 100%)    Parameters below as of 01 May 2024 05:08  Patient On (Oxygen Delivery Method): nasal cannula        TESTS & MEASUREMENTS:                        9.7    9.41  )-----------( 622      ( 01 May 2024 07:14 )             31.7     05-01    140  |  99  |  19  ----------------------------<  125<H>  4.8   |  27  |  0.5<L>    Ca    8.8      01 May 2024 07:14  Mg     2.5     05-01          Culture - Blood (collected 04-28-24 @ 07:23)  Source: .Blood None  Preliminary Report (04-30-24 @ 17:02):    No growth at 48 Hours      Urinalysis Basic - ( 01 May 2024 07:14 )    Color: x / Appearance: x / SG: x / pH: x  Gluc: 125 mg/dL / Ketone: x  / Bili: x / Urobili: x   Blood: x / Protein: x / Nitrite: x   Leuk Esterase: x / RBC: x / WBC x   Sq Epi: x / Non Sq Epi: x / Bacteria: x          RADIOLOGY & ADDITIONAL TESTS:  Personal review of radiological diagnostics performed  Echo and EKG results noted when applicable.     MEDICATIONS:  acetaminophen     Tablet .. 650 milliGRAM(s) Oral every 6 hours PRN  albuterol/ipratropium for Nebulization 3 milliLiter(s) Nebulizer every 6 hours  aluminum hydroxide/magnesium hydroxide/simethicone Suspension 30 milliLiter(s) Oral every 4 hours PRN  AQUAPHOR (petrolatum Ointment) 1 Application(s) Topical two times a day  artificial  tears Solution 1 Drop(s) Both EYES two times a day  calcium carbonate   1250 mG (OsCal) 1 Tablet(s) Oral daily  chlorhexidine 2% Cloths 1 Application(s) Topical daily  enoxaparin Injectable 30 milliGRAM(s) SubCutaneous every 24 hours  famotidine    Tablet 20 milliGRAM(s) Oral two times a day  gabapentin Solution 300 milliGRAM(s) Oral two times a day  hydrocortisone 1% Cream 1 Application(s) Topical daily  levETIRAcetam  Solution 750 milliGRAM(s) Oral two times a day  melatonin 3 milliGRAM(s) Oral at bedtime PRN  midodrine 5 milliGRAM(s) Oral every 8 hours  ondansetron Injectable 4 milliGRAM(s) IV Push every 8 hours PRN  raloxifene 60 milliGRAM(s) Oral daily  scopolamine 1 mG/72 Hr(s) Patch 1 Patch Transdermal every 72 hours  senna 2 Tablet(s) Oral at bedtime  silver sulfADIAZINE 1% Cream 1 Application(s) Topical two times a day      ANTIBIOTICS:

## 2024-05-02 LAB
ANION GAP SERPL CALC-SCNC: 13 MMOL/L — SIGNIFICANT CHANGE UP (ref 7–14)
BASOPHILS # BLD AUTO: 0.1 K/UL — SIGNIFICANT CHANGE UP (ref 0–0.2)
BASOPHILS NFR BLD AUTO: 0.7 % — SIGNIFICANT CHANGE UP (ref 0–1)
BUN SERPL-MCNC: 18 MG/DL — SIGNIFICANT CHANGE UP (ref 10–20)
CALCIUM SERPL-MCNC: 8.5 MG/DL — SIGNIFICANT CHANGE UP (ref 8.4–10.5)
CHLORIDE SERPL-SCNC: 99 MMOL/L — SIGNIFICANT CHANGE UP (ref 98–110)
CO2 SERPL-SCNC: 28 MMOL/L — SIGNIFICANT CHANGE UP (ref 17–32)
CREAT SERPL-MCNC: 0.5 MG/DL — LOW (ref 0.7–1.5)
EGFR: 109 ML/MIN/1.73M2 — SIGNIFICANT CHANGE UP
EOSINOPHIL # BLD AUTO: 0.23 K/UL — SIGNIFICANT CHANGE UP (ref 0–0.7)
EOSINOPHIL NFR BLD AUTO: 1.5 % — SIGNIFICANT CHANGE UP (ref 0–8)
GLUCOSE BLDC GLUCOMTR-MCNC: 125 MG/DL — HIGH (ref 70–99)
GLUCOSE BLDC GLUCOMTR-MCNC: 132 MG/DL — HIGH (ref 70–99)
GLUCOSE BLDC GLUCOMTR-MCNC: 147 MG/DL — HIGH (ref 70–99)
GLUCOSE BLDC GLUCOMTR-MCNC: 162 MG/DL — HIGH (ref 70–99)
GLUCOSE BLDC GLUCOMTR-MCNC: 184 MG/DL — HIGH (ref 70–99)
GLUCOSE BLDC GLUCOMTR-MCNC: 199 MG/DL — HIGH (ref 70–99)
GLUCOSE SERPL-MCNC: 126 MG/DL — HIGH (ref 70–99)
HCT VFR BLD CALC: 32.6 % — LOW (ref 37–47)
HGB BLD-MCNC: 10 G/DL — LOW (ref 12–16)
IMM GRANULOCYTES NFR BLD AUTO: 1.4 % — HIGH (ref 0.1–0.3)
LYMPHOCYTES # BLD AUTO: 13.1 % — LOW (ref 20.5–51.1)
LYMPHOCYTES # BLD AUTO: 2.01 K/UL — SIGNIFICANT CHANGE UP (ref 1.2–3.4)
MAGNESIUM SERPL-MCNC: 2.4 MG/DL — SIGNIFICANT CHANGE UP (ref 1.8–2.4)
MCHC RBC-ENTMCNC: 28.6 PG — SIGNIFICANT CHANGE UP (ref 27–31)
MCHC RBC-ENTMCNC: 30.7 G/DL — LOW (ref 32–37)
MCV RBC AUTO: 93.1 FL — SIGNIFICANT CHANGE UP (ref 81–99)
MONOCYTES # BLD AUTO: 0.5 K/UL — SIGNIFICANT CHANGE UP (ref 0.1–0.6)
MONOCYTES NFR BLD AUTO: 3.3 % — SIGNIFICANT CHANGE UP (ref 1.7–9.3)
NEUTROPHILS # BLD AUTO: 12.23 K/UL — HIGH (ref 1.4–6.5)
NEUTROPHILS NFR BLD AUTO: 80 % — HIGH (ref 42.2–75.2)
NRBC # BLD: 0 /100 WBCS — SIGNIFICANT CHANGE UP (ref 0–0)
PLATELET # BLD AUTO: 631 K/UL — HIGH (ref 130–400)
PMV BLD: 10.1 FL — SIGNIFICANT CHANGE UP (ref 7.4–10.4)
POTASSIUM SERPL-MCNC: 4.9 MMOL/L — SIGNIFICANT CHANGE UP (ref 3.5–5)
POTASSIUM SERPL-SCNC: 4.9 MMOL/L — SIGNIFICANT CHANGE UP (ref 3.5–5)
RBC # BLD: 3.5 M/UL — LOW (ref 4.2–5.4)
RBC # FLD: 22.4 % — HIGH (ref 11.5–14.5)
SODIUM SERPL-SCNC: 140 MMOL/L — SIGNIFICANT CHANGE UP (ref 135–146)
WBC # BLD: 15.29 K/UL — HIGH (ref 4.8–10.8)
WBC # FLD AUTO: 15.29 K/UL — HIGH (ref 4.8–10.8)

## 2024-05-02 PROCEDURE — 99232 SBSQ HOSP IP/OBS MODERATE 35: CPT

## 2024-05-02 PROCEDURE — 71045 X-RAY EXAM CHEST 1 VIEW: CPT | Mod: 26

## 2024-05-02 RX ORDER — ACETAMINOPHEN 500 MG
650 TABLET ORAL ONCE
Refills: 0 | Status: COMPLETED | OUTPATIENT
Start: 2024-05-02 | End: 2024-05-02

## 2024-05-02 RX ORDER — MEROPENEM 1 G/30ML
1000 INJECTION INTRAVENOUS EVERY 8 HOURS
Refills: 0 | Status: DISCONTINUED | OUTPATIENT
Start: 2024-05-02 | End: 2024-05-03

## 2024-05-02 RX ADMIN — Medication 650 MILLIGRAM(S): at 16:45

## 2024-05-02 RX ADMIN — Medication 1 DROP(S): at 05:36

## 2024-05-02 RX ADMIN — MIDODRINE HYDROCHLORIDE 5 MILLIGRAM(S): 2.5 TABLET ORAL at 21:38

## 2024-05-02 RX ADMIN — Medication 3 MILLILITER(S): at 03:09

## 2024-05-02 RX ADMIN — Medication 1 APPLICATION(S): at 18:06

## 2024-05-02 RX ADMIN — Medication 260 MILLIGRAM(S): at 15:33

## 2024-05-02 RX ADMIN — Medication 650 MILLIGRAM(S): at 11:20

## 2024-05-02 RX ADMIN — LEVETIRACETAM 750 MILLIGRAM(S): 250 TABLET, FILM COATED ORAL at 05:35

## 2024-05-02 RX ADMIN — ENOXAPARIN SODIUM 30 MILLIGRAM(S): 100 INJECTION SUBCUTANEOUS at 14:01

## 2024-05-02 RX ADMIN — GABAPENTIN 300 MILLIGRAM(S): 400 CAPSULE ORAL at 05:36

## 2024-05-02 RX ADMIN — Medication 3 MILLILITER(S): at 09:05

## 2024-05-02 RX ADMIN — SCOPALAMINE 1 PATCH: 1 PATCH, EXTENDED RELEASE TRANSDERMAL at 07:18

## 2024-05-02 RX ADMIN — Medication 1 TABLET(S): at 11:15

## 2024-05-02 RX ADMIN — Medication 650 MILLIGRAM(S): at 12:20

## 2024-05-02 RX ADMIN — MEROPENEM 100 MILLIGRAM(S): 1 INJECTION INTRAVENOUS at 21:38

## 2024-05-02 RX ADMIN — FAMOTIDINE 20 MILLIGRAM(S): 10 INJECTION INTRAVENOUS at 05:35

## 2024-05-02 RX ADMIN — LEVETIRACETAM 750 MILLIGRAM(S): 250 TABLET, FILM COATED ORAL at 17:07

## 2024-05-02 RX ADMIN — Medication 3 MILLILITER(S): at 19:52

## 2024-05-02 RX ADMIN — FAMOTIDINE 20 MILLIGRAM(S): 10 INJECTION INTRAVENOUS at 17:07

## 2024-05-02 RX ADMIN — Medication 1 APPLICATION(S): at 17:04

## 2024-05-02 RX ADMIN — MIDODRINE HYDROCHLORIDE 5 MILLIGRAM(S): 2.5 TABLET ORAL at 05:35

## 2024-05-02 RX ADMIN — Medication 1 APPLICATION(S): at 05:37

## 2024-05-02 RX ADMIN — RALOXIFENE HYDROCHLORIDE 60 MILLIGRAM(S): 60 TABLET, COATED ORAL at 11:13

## 2024-05-02 RX ADMIN — CHLORHEXIDINE GLUCONATE 1 APPLICATION(S): 213 SOLUTION TOPICAL at 11:15

## 2024-05-02 RX ADMIN — GABAPENTIN 300 MILLIGRAM(S): 400 CAPSULE ORAL at 18:06

## 2024-05-02 RX ADMIN — SCOPALAMINE 1 PATCH: 1 PATCH, EXTENDED RELEASE TRANSDERMAL at 19:15

## 2024-05-02 RX ADMIN — Medication 1 APPLICATION(S): at 05:36

## 2024-05-02 RX ADMIN — MIDODRINE HYDROCHLORIDE 5 MILLIGRAM(S): 2.5 TABLET ORAL at 14:02

## 2024-05-02 RX ADMIN — Medication 1 DROP(S): at 17:13

## 2024-05-02 RX ADMIN — SENNA PLUS 2 TABLET(S): 8.6 TABLET ORAL at 21:38

## 2024-05-02 NOTE — PROGRESS NOTE ADULT - SUBJECTIVE AND OBJECTIVE BOX
SUBJECTIVE:    Patient is a 58y old Female who presents with a chief complaint of Pneumonia (01 May 2024 12:25)    Currently admitted to medicine with the primary diagnosis of Acute sepsis       Today is hospital day 17d. This morning she is resting comfortably in bed and reports no new issues or overnight events.     PAST MEDICAL & SURGICAL HISTORY  Down syndrome    Osteoporosis    Mild anemia    Neuropathy    S/P debridement  of R hip on 3/2/21      SOCIAL HISTORY:  Negative for smoking/alcohol/drug use.     ALLERGIES:  No Known Allergies    MEDICATIONS:  STANDING MEDICATIONS  albuterol/ipratropium for Nebulization 3 milliLiter(s) Nebulizer every 6 hours  AQUAPHOR (petrolatum Ointment) 1 Application(s) Topical two times a day  artificial  tears Solution 1 Drop(s) Both EYES two times a day  calcium carbonate   1250 mG (OsCal) 1 Tablet(s) Oral daily  chlorhexidine 2% Cloths 1 Application(s) Topical daily  enoxaparin Injectable 30 milliGRAM(s) SubCutaneous every 24 hours  famotidine    Tablet 20 milliGRAM(s) Oral two times a day  gabapentin Solution 300 milliGRAM(s) Oral two times a day  hydrocortisone 1% Cream 1 Application(s) Topical daily  levETIRAcetam  Solution 750 milliGRAM(s) Oral two times a day  midodrine 5 milliGRAM(s) Oral every 8 hours  raloxifene 60 milliGRAM(s) Oral daily  scopolamine 1 mG/72 Hr(s) Patch 1 Patch Transdermal every 72 hours  senna 2 Tablet(s) Oral at bedtime  silver sulfADIAZINE 1% Cream 1 Application(s) Topical two times a day    PRN MEDICATIONS  acetaminophen     Tablet .. 650 milliGRAM(s) Oral every 6 hours PRN  aluminum hydroxide/magnesium hydroxide/simethicone Suspension 30 milliLiter(s) Oral every 4 hours PRN  melatonin 3 milliGRAM(s) Oral at bedtime PRN  ondansetron Injectable 4 milliGRAM(s) IV Push every 8 hours PRN    VITALS:   T(F): 99.5  HR: 121  BP: 113/67  RR: 18  SpO2: 91%    LABS:                        10.0   15.29 )-----------( 631      ( 02 May 2024 05:21 )             32.6     05-02    140  |  99  |  18  ----------------------------<  126<H>  4.9   |  28  |  0.5<L>    Ca    8.5      02 May 2024 05:21  Mg     2.4     05-02        Urinalysis Basic - ( 02 May 2024 05:21 )    Color: x / Appearance: x / SG: x / pH: x  Gluc: 126 mg/dL / Ketone: x  / Bili: x / Urobili: x   Blood: x / Protein: x / Nitrite: x   Leuk Esterase: x / RBC: x / WBC x   Sq Epi: x / Non Sq Epi: x / Bacteria: x                RADIOLOGY:    PHYSICAL EXAM:  GEN: ill appearing  LUNGS: Clear to auscultation bilaterally   HEART: S1/S2 present. RRR.   ABD: Soft, non-tender, non-distended. Bowel sounds present  EXT: NC/NC/NE/2+PP/COHEN/Skin Intact.   NEURO: AAOX0 SUBJECTIVE:    Patient is a 58y old Female who presents with a chief complaint of Pneumonia (01 May 2024 12:25)    Currently admitted to medicine with the primary diagnosis of Acute sepsis       Today is hospital day 17d. This morning she is resting comfortably in bed and reports no new issues or overnight events.     PAST MEDICAL & SURGICAL HISTORY  Down syndrome    Osteoporosis    Mild anemia    Neuropathy    S/P debridement  of R hip on 3/2/21      SOCIAL HISTORY:  Negative for smoking/alcohol/drug use.     ALLERGIES:  No Known Allergies    MEDICATIONS:  STANDING MEDICATIONS  albuterol/ipratropium for Nebulization 3 milliLiter(s) Nebulizer every 6 hours  AQUAPHOR (petrolatum Ointment) 1 Application(s) Topical two times a day  artificial  tears Solution 1 Drop(s) Both EYES two times a day  calcium carbonate   1250 mG (OsCal) 1 Tablet(s) Oral daily  chlorhexidine 2% Cloths 1 Application(s) Topical daily  enoxaparin Injectable 30 milliGRAM(s) SubCutaneous every 24 hours  famotidine    Tablet 20 milliGRAM(s) Oral two times a day  gabapentin Solution 300 milliGRAM(s) Oral two times a day  hydrocortisone 1% Cream 1 Application(s) Topical daily  levETIRAcetam  Solution 750 milliGRAM(s) Oral two times a day  midodrine 5 milliGRAM(s) Oral every 8 hours  raloxifene 60 milliGRAM(s) Oral daily  scopolamine 1 mG/72 Hr(s) Patch 1 Patch Transdermal every 72 hours  senna 2 Tablet(s) Oral at bedtime  silver sulfADIAZINE 1% Cream 1 Application(s) Topical two times a day    PRN MEDICATIONS  acetaminophen     Tablet .. 650 milliGRAM(s) Oral every 6 hours PRN  aluminum hydroxide/magnesium hydroxide/simethicone Suspension 30 milliLiter(s) Oral every 4 hours PRN  melatonin 3 milliGRAM(s) Oral at bedtime PRN  ondansetron Injectable 4 milliGRAM(s) IV Push every 8 hours PRN    VITALS:   T(F): 101.4 (05-02-24 @ 11:12), Max: 101.4 (05-02-24 @ 11:12)  HR: 121 (05-02-24 @ 05:08) (114 - 121)  BP: 113/67 (05-02-24 @ 05:08) (98/55 - 113/67)  RR: 18 (05-02-24 @ 05:08) (18 - 18)  SpO2: 91% (05-02-24 @ 05:08) (91% - 95%)    LABS:                        10.0   15.29 )-----------( 631      ( 02 May 2024 05:21 )             32.6     05-02    140  |  99  |  18  ----------------------------<  126<H>  4.9   |  28  |  0.5<L>    Ca    8.5      02 May 2024 05:21  Mg     2.4     05-02        Urinalysis Basic - ( 02 May 2024 05:21 )    Color: x / Appearance: x / SG: x / pH: x  Gluc: 126 mg/dL / Ketone: x  / Bili: x / Urobili: x   Blood: x / Protein: x / Nitrite: x   Leuk Esterase: x / RBC: x / WBC x   Sq Epi: x / Non Sq Epi: x / Bacteria: x    Culture - Blood in AM (04.28.24 @ 07:23)    Specimen Source: .Blood None   Culture Results:   No growth at 72 Hours      PHYSICAL EXAM:  GEN: ill appearing  LUNGS: Clear to auscultation bilaterally   HEART: S1/S2 present. RRR.   ABD: Soft, non-tender, non-distended. Bowel sounds present  EXT: NC/NC/NE/2+PP/COHEN/   NEURO: AAOX0

## 2024-05-02 NOTE — PROGRESS NOTE ADULT - ASSESSMENT
59yo F w/ pmhx of Down's syndrome, cerebral palsy, seizure disorder presents from [facility] for respiratory distress. Admitted to SDU for severe sepsis and acute hypoxemic respiratory failure likely secondary to recurrent CAP.     #Acute hypoxemic respiratory failure   #Severe sepsis with septic shock  #Possible recurrent CAP    #Metabolic encephalopathy  - CXR: possible basilar opacities  - COVID/RSV, MSRA, negative; Bcx with contaminant  - Bcx 4/15: staph capitis  - completed two course of Meropenem   - 4/22: fever and WBC  - CTA chest 4/24: no PE  - repeat Bcx NGTD. Fungitell negative  - currently on 4L NC  - sputum culture unable to collect  - 5/2: Fever, WBC, tachy again. f/u ID    #Chronic hypotension  - target MAP 60 (Patient always has Bp on the softer side)  - c/w Midodrine 5mg    #Episodes of Vomiting:   - On PEG feed   - Aspiration risk ; C Xray done   - c/w PEG feeding    # B/l feet ulcers  - Colonized with ESBL strain  - Off loading to prevent pressure sores   - Wound care following     #Seizure disorder  #Down syndrome  #Nonverbal bedbound  #PEG tube   - c/w home meds

## 2024-05-03 LAB
ANION GAP SERPL CALC-SCNC: 11 MMOL/L — SIGNIFICANT CHANGE UP (ref 7–14)
BASOPHILS # BLD AUTO: 0.08 K/UL — SIGNIFICANT CHANGE UP (ref 0–0.2)
BASOPHILS NFR BLD AUTO: 0.6 % — SIGNIFICANT CHANGE UP (ref 0–1)
BUN SERPL-MCNC: 18 MG/DL — SIGNIFICANT CHANGE UP (ref 10–20)
CALCIUM SERPL-MCNC: 8.4 MG/DL — SIGNIFICANT CHANGE UP (ref 8.4–10.5)
CHLORIDE SERPL-SCNC: 101 MMOL/L — SIGNIFICANT CHANGE UP (ref 98–110)
CO2 SERPL-SCNC: 30 MMOL/L — SIGNIFICANT CHANGE UP (ref 17–32)
CREAT SERPL-MCNC: 0.5 MG/DL — LOW (ref 0.7–1.5)
CULTURE RESULTS: SIGNIFICANT CHANGE UP
EGFR: 109 ML/MIN/1.73M2 — SIGNIFICANT CHANGE UP
EOSINOPHIL # BLD AUTO: 0.22 K/UL — SIGNIFICANT CHANGE UP (ref 0–0.7)
EOSINOPHIL NFR BLD AUTO: 1.7 % — SIGNIFICANT CHANGE UP (ref 0–8)
GLUCOSE BLDC GLUCOMTR-MCNC: 115 MG/DL — HIGH (ref 70–99)
GLUCOSE BLDC GLUCOMTR-MCNC: 140 MG/DL — HIGH (ref 70–99)
GLUCOSE BLDC GLUCOMTR-MCNC: 161 MG/DL — HIGH (ref 70–99)
GLUCOSE SERPL-MCNC: 127 MG/DL — HIGH (ref 70–99)
HCT VFR BLD CALC: 29.4 % — LOW (ref 37–47)
HGB BLD-MCNC: 8.9 G/DL — LOW (ref 12–16)
IMM GRANULOCYTES NFR BLD AUTO: 1.3 % — HIGH (ref 0.1–0.3)
LYMPHOCYTES # BLD AUTO: 1.58 K/UL — SIGNIFICANT CHANGE UP (ref 1.2–3.4)
LYMPHOCYTES # BLD AUTO: 12.1 % — LOW (ref 20.5–51.1)
MAGNESIUM SERPL-MCNC: 2.5 MG/DL — HIGH (ref 1.8–2.4)
MCHC RBC-ENTMCNC: 28.6 PG — SIGNIFICANT CHANGE UP (ref 27–31)
MCHC RBC-ENTMCNC: 30.3 G/DL — LOW (ref 32–37)
MCV RBC AUTO: 94.5 FL — SIGNIFICANT CHANGE UP (ref 81–99)
MONOCYTES # BLD AUTO: 0.51 K/UL — SIGNIFICANT CHANGE UP (ref 0.1–0.6)
MONOCYTES NFR BLD AUTO: 3.9 % — SIGNIFICANT CHANGE UP (ref 1.7–9.3)
NEUTROPHILS # BLD AUTO: 10.49 K/UL — HIGH (ref 1.4–6.5)
NEUTROPHILS NFR BLD AUTO: 80.4 % — HIGH (ref 42.2–75.2)
NRBC # BLD: 0 /100 WBCS — SIGNIFICANT CHANGE UP (ref 0–0)
PLATELET # BLD AUTO: 565 K/UL — HIGH (ref 130–400)
PMV BLD: 10.6 FL — HIGH (ref 7.4–10.4)
POTASSIUM SERPL-MCNC: 4.4 MMOL/L — SIGNIFICANT CHANGE UP (ref 3.5–5)
POTASSIUM SERPL-SCNC: 4.4 MMOL/L — SIGNIFICANT CHANGE UP (ref 3.5–5)
RBC # BLD: 3.11 M/UL — LOW (ref 4.2–5.4)
RBC # FLD: 22.8 % — HIGH (ref 11.5–14.5)
SODIUM SERPL-SCNC: 142 MMOL/L — SIGNIFICANT CHANGE UP (ref 135–146)
SPECIMEN SOURCE: SIGNIFICANT CHANGE UP
WBC # BLD: 13.05 K/UL — HIGH (ref 4.8–10.8)
WBC # FLD AUTO: 13.05 K/UL — HIGH (ref 4.8–10.8)

## 2024-05-03 PROCEDURE — 99232 SBSQ HOSP IP/OBS MODERATE 35: CPT

## 2024-05-03 RX ADMIN — FAMOTIDINE 20 MILLIGRAM(S): 10 INJECTION INTRAVENOUS at 05:35

## 2024-05-03 RX ADMIN — GABAPENTIN 300 MILLIGRAM(S): 400 CAPSULE ORAL at 05:34

## 2024-05-03 RX ADMIN — MIDODRINE HYDROCHLORIDE 5 MILLIGRAM(S): 2.5 TABLET ORAL at 05:35

## 2024-05-03 RX ADMIN — FAMOTIDINE 20 MILLIGRAM(S): 10 INJECTION INTRAVENOUS at 18:01

## 2024-05-03 RX ADMIN — MEROPENEM 100 MILLIGRAM(S): 1 INJECTION INTRAVENOUS at 05:33

## 2024-05-03 RX ADMIN — ENOXAPARIN SODIUM 30 MILLIGRAM(S): 100 INJECTION SUBCUTANEOUS at 12:17

## 2024-05-03 RX ADMIN — CHLORHEXIDINE GLUCONATE 1 APPLICATION(S): 213 SOLUTION TOPICAL at 12:17

## 2024-05-03 RX ADMIN — RALOXIFENE HYDROCHLORIDE 60 MILLIGRAM(S): 60 TABLET, COATED ORAL at 12:18

## 2024-05-03 RX ADMIN — SCOPALAMINE 1 PATCH: 1 PATCH, EXTENDED RELEASE TRANSDERMAL at 06:00

## 2024-05-03 RX ADMIN — Medication 1 APPLICATION(S): at 18:02

## 2024-05-03 RX ADMIN — Medication 1 APPLICATION(S): at 05:33

## 2024-05-03 RX ADMIN — Medication 1 APPLICATION(S): at 12:17

## 2024-05-03 RX ADMIN — Medication 3 MILLILITER(S): at 19:45

## 2024-05-03 RX ADMIN — Medication 3 MILLILITER(S): at 03:29

## 2024-05-03 RX ADMIN — Medication 1 TABLET(S): at 12:17

## 2024-05-03 RX ADMIN — Medication 1 APPLICATION(S): at 05:36

## 2024-05-03 RX ADMIN — SCOPALAMINE 1 PATCH: 1 PATCH, EXTENDED RELEASE TRANSDERMAL at 19:30

## 2024-05-03 RX ADMIN — Medication 1 DROP(S): at 18:04

## 2024-05-03 RX ADMIN — MEROPENEM 100 MILLIGRAM(S): 1 INJECTION INTRAVENOUS at 12:27

## 2024-05-03 RX ADMIN — MIDODRINE HYDROCHLORIDE 5 MILLIGRAM(S): 2.5 TABLET ORAL at 22:01

## 2024-05-03 RX ADMIN — SCOPALAMINE 1 PATCH: 1 PATCH, EXTENDED RELEASE TRANSDERMAL at 07:40

## 2024-05-03 RX ADMIN — LEVETIRACETAM 750 MILLIGRAM(S): 250 TABLET, FILM COATED ORAL at 05:34

## 2024-05-03 RX ADMIN — Medication 1 DROP(S): at 05:35

## 2024-05-03 RX ADMIN — SCOPALAMINE 1 PATCH: 1 PATCH, EXTENDED RELEASE TRANSDERMAL at 05:33

## 2024-05-03 RX ADMIN — GABAPENTIN 300 MILLIGRAM(S): 400 CAPSULE ORAL at 18:02

## 2024-05-03 RX ADMIN — MIDODRINE HYDROCHLORIDE 5 MILLIGRAM(S): 2.5 TABLET ORAL at 12:27

## 2024-05-03 RX ADMIN — SENNA PLUS 2 TABLET(S): 8.6 TABLET ORAL at 22:01

## 2024-05-03 RX ADMIN — Medication 3 MILLILITER(S): at 07:32

## 2024-05-03 RX ADMIN — Medication 3 MILLILITER(S): at 13:13

## 2024-05-03 RX ADMIN — LEVETIRACETAM 750 MILLIGRAM(S): 250 TABLET, FILM COATED ORAL at 18:02

## 2024-05-03 RX ADMIN — Medication 1 APPLICATION(S): at 18:04

## 2024-05-03 NOTE — PROGRESS NOTE ADULT - ASSESSMENT
58 MARÍA w a PMH of Trisomy 21, a recent admission [id number] for RSV PNA w MICU stay (never intubated), nonverbal at baseline, hypotension, cerebral palsey w seizure disorder, ESBL isolated from ulcer of rt foot 11/23 BIBEMS from [facility] for respiratory distress and high fever. Patient SOA w , RR 22 and febrile to 102.8 rectally. Patient also hypotensive to 82/51. Labs notable for leukocytosis of 12.25 w 3.6% bands, large platelets and a compensated Respiratory acidosis. Imaging notable for a Rt Mid lobe ground glass opacification. Patient MRSA Negative w RVP negative for COVID, RSV and Flu.     IMPRESSION/RECOMMENDATIONS  Aspiration with chemical pneumonitis  4/28 BCx NG  CXR no new opacities 5/2   WBC 15.2  Fungitell assay 53 on 4/24 4/24 CT no PE, multifocal opacities  4/17,19,21, 23 BCx NG  Immunosuppression could result in poor clinical outcome.   Nares ORSA NG  RVP NG  4/15 Bcx 1/2 CoNS > not a true pathogen  Was colonized with ESBL strain in feet wounds  Nares ORSA NG  COVID 19/ Influenza/ RSV NG.   Feet with pressure related ulcers. No abscess/cellulitis    -D/c meropenem   - has been on Meropenem since 4/14 ( off and on )  -repeat BCx off Abx

## 2024-05-03 NOTE — PROGRESS NOTE ADULT - ASSESSMENT
59yo F w/ pmhx of Down's syndrome, cerebral palsy, seizure disorder presents from [facility] for respiratory distress. Admitted to SDU for severe sepsis and acute hypoxemic respiratory failure likely secondary to recurrent CAP.     #Acute hypoxemic respiratory failure   #Severe sepsis with septic shock  #Possible recurrent CAP    #Metabolic encephalopathy  - CXR: possible basilar opacities  - COVID/RSV, MSRA, negative; Bcx with contaminant  - Bcx 4/15: staph capitis  - completed two course of Meropenem   - 4/22: fever and WBC  - CTA chest 4/24: no PE  - repeat Bcx NGTD. Fungitell negative  - currently on 4L NC  - sputum culture unable to collect  - 5/2: Fever, WBC, tachy again. f/u ID  - restarted danna    #Chronic hypotension  - target MAP 60 (Patient always has Bp on the softer side)  - c/w Midodrine 5mg    #Episodes of Vomiting:   - On PEG feed   - Aspiration risk ; C Xray done   - c/w PEG feeding    # B/l feet ulcers  - Colonized with ESBL strain  - Off loading to prevent pressure sores   - Wound care following     #Seizure disorder  #Down syndrome  #Nonverbal bedbound  #PEG tube   - c/w home meds

## 2024-05-03 NOTE — PROGRESS NOTE ADULT - SUBJECTIVE AND OBJECTIVE BOX
JERICHOTEA  58y, Female    All available historical data reviewed    OVERNIGHT EVENTS:  isolated fevers  non verbal  does not follow commands    ROS:  unable to obtain history secondary to patient's mental status     VITALS:  T(F): 99.9, Max: 101.4 (05-02-24 @ 14:23)  HR: 115  BP: 96/64  RR: 17Vital Signs Last 24 Hrs  T(C): 37.7 (03 May 2024 05:13), Max: 38.6 (02 May 2024 14:23)  T(F): 99.9 (03 May 2024 05:13), Max: 101.4 (02 May 2024 14:23)  HR: 115 (03 May 2024 05:13) (104 - 126)  BP: 96/64 (03 May 2024 05:13) (92/50 - 115/65)  BP(mean): --  RR: 17 (03 May 2024 05:13) (17 - 18)  SpO2: 92% (03 May 2024 05:13) (92% - 98%)    Parameters below as of 02 May 2024 16:31  Patient On (Oxygen Delivery Method): nasal cannula  O2 Flow (L/min): 4      TESTS & MEASUREMENTS:                        10.0   15.29 )-----------( 631      ( 02 May 2024 05:21 )             32.6     05-03    142  |  101  |  18  ----------------------------<  127<H>  4.4   |  30  |  0.5<L>    Ca    8.4      03 May 2024 05:59  Mg     2.5     05-03          Culture - Blood (collected 04-28-24 @ 07:23)  Source: .Blood None  Preliminary Report (05-02-24 @ 17:01):    No growth at 4 days      Urinalysis Basic - ( 03 May 2024 05:59 )    Color: x / Appearance: x / SG: x / pH: x  Gluc: 127 mg/dL / Ketone: x  / Bili: x / Urobili: x   Blood: x / Protein: x / Nitrite: x   Leuk Esterase: x / RBC: x / WBC x   Sq Epi: x / Non Sq Epi: x / Bacteria: x          RADIOLOGY & ADDITIONAL TESTS:  Personal review of radiological diagnostics performed  Echo and EKG results noted when applicable.     MEDICATIONS:  acetaminophen     Tablet .. 650 milliGRAM(s) Oral every 6 hours PRN  albuterol/ipratropium for Nebulization 3 milliLiter(s) Nebulizer every 6 hours  aluminum hydroxide/magnesium hydroxide/simethicone Suspension 30 milliLiter(s) Oral every 4 hours PRN  AQUAPHOR (petrolatum Ointment) 1 Application(s) Topical two times a day  artificial  tears Solution 1 Drop(s) Both EYES two times a day  calcium carbonate   1250 mG (OsCal) 1 Tablet(s) Oral daily  chlorhexidine 2% Cloths 1 Application(s) Topical daily  enoxaparin Injectable 30 milliGRAM(s) SubCutaneous every 24 hours  famotidine    Tablet 20 milliGRAM(s) Oral two times a day  gabapentin Solution 300 milliGRAM(s) Oral two times a day  hydrocortisone 1% Cream 1 Application(s) Topical daily  levETIRAcetam  Solution 750 milliGRAM(s) Oral two times a day  melatonin 3 milliGRAM(s) Oral at bedtime PRN  meropenem  IVPB 1000 milliGRAM(s) IV Intermittent every 8 hours  midodrine 5 milliGRAM(s) Oral every 8 hours  ondansetron Injectable 4 milliGRAM(s) IV Push every 8 hours PRN  raloxifene 60 milliGRAM(s) Oral daily  scopolamine 1 mG/72 Hr(s) Patch 1 Patch Transdermal every 72 hours  senna 2 Tablet(s) Oral at bedtime  silver sulfADIAZINE 1% Cream 1 Application(s) Topical two times a day      ANTIBIOTICS:  meropenem  IVPB 1000 milliGRAM(s) IV Intermittent every 8 hours

## 2024-05-03 NOTE — PROGRESS NOTE ADULT - SUBJECTIVE AND OBJECTIVE BOX
SUBJECTIVE:    Patient is a 58y old Female who presents with a chief complaint of Pneumonia (02 May 2024 10:43)    Currently admitted to medicine with the primary diagnosis of Acute sepsis       Today is hospital day 18d. This morning she is resting comfortably in bed and reports no new issues or overnight events.     PAST MEDICAL & SURGICAL HISTORY  Down syndrome    Osteoporosis    Mild anemia    Neuropathy    S/P debridement  of R hip on 3/2/21      SOCIAL HISTORY:  Negative for smoking/alcohol/drug use.     ALLERGIES:  No Known Allergies    MEDICATIONS:  STANDING MEDICATIONS  albuterol/ipratropium for Nebulization 3 milliLiter(s) Nebulizer every 6 hours  AQUAPHOR (petrolatum Ointment) 1 Application(s) Topical two times a day  artificial  tears Solution 1 Drop(s) Both EYES two times a day  calcium carbonate   1250 mG (OsCal) 1 Tablet(s) Oral daily  chlorhexidine 2% Cloths 1 Application(s) Topical daily  enoxaparin Injectable 30 milliGRAM(s) SubCutaneous every 24 hours  famotidine    Tablet 20 milliGRAM(s) Oral two times a day  gabapentin Solution 300 milliGRAM(s) Oral two times a day  hydrocortisone 1% Cream 1 Application(s) Topical daily  levETIRAcetam  Solution 750 milliGRAM(s) Oral two times a day  meropenem  IVPB 1000 milliGRAM(s) IV Intermittent every 8 hours  midodrine 5 milliGRAM(s) Oral every 8 hours  raloxifene 60 milliGRAM(s) Oral daily  scopolamine 1 mG/72 Hr(s) Patch 1 Patch Transdermal every 72 hours  senna 2 Tablet(s) Oral at bedtime  silver sulfADIAZINE 1% Cream 1 Application(s) Topical two times a day    PRN MEDICATIONS  acetaminophen     Tablet .. 650 milliGRAM(s) Oral every 6 hours PRN  aluminum hydroxide/magnesium hydroxide/simethicone Suspension 30 milliLiter(s) Oral every 4 hours PRN  melatonin 3 milliGRAM(s) Oral at bedtime PRN  ondansetron Injectable 4 milliGRAM(s) IV Push every 8 hours PRN    VITALS:   T(F): 99.9  HR: 115  BP: 96/64  RR: 17  SpO2: 92%    LABS:                        10.0   15.29 )-----------( 631      ( 02 May 2024 05:21 )             32.6     05-02    140  |  99  |  18  ----------------------------<  126<H>  4.9   |  28  |  0.5<L>    Ca    8.5      02 May 2024 05:21  Mg     2.4     05-02        Urinalysis Basic - ( 02 May 2024 05:21 )    Color: x / Appearance: x / SG: x / pH: x  Gluc: 126 mg/dL / Ketone: x  / Bili: x / Urobili: x   Blood: x / Protein: x / Nitrite: x   Leuk Esterase: x / RBC: x / WBC x   Sq Epi: x / Non Sq Epi: x / Bacteria: x                RADIOLOGY:    PHYSICAL EXAM:  GEN: ill appearing  LUNGS: congested    HEART: S1/S2 present. RRR.   ABD: Soft, non-tender, non-distended. Bowel sounds present  EXT: NC/NC/NE/2+PP/COHEN/Skin Intact.   NEURO: AAOX0   SUBJECTIVE:    Patient is a 58y old Female who presents with a chief complaint of Pneumonia (02 May 2024 10:43)    Currently admitted to medicine with the primary diagnosis of Acute sepsis       Today is hospital day 18d. This morning she is resting comfortably in bed and reports no new issues or overnight events.     PAST MEDICAL & SURGICAL HISTORY  Down syndrome    Osteoporosis    Mild anemia    Neuropathy    S/P debridement  of R hip on 3/2/21      SOCIAL HISTORY:  Negative for smoking/alcohol/drug use.     ALLERGIES:  No Known Allergies    MEDICATIONS:  STANDING MEDICATIONS  albuterol/ipratropium for Nebulization 3 milliLiter(s) Nebulizer every 6 hours  AQUAPHOR (petrolatum Ointment) 1 Application(s) Topical two times a day  artificial  tears Solution 1 Drop(s) Both EYES two times a day  calcium carbonate   1250 mG (OsCal) 1 Tablet(s) Oral daily  chlorhexidine 2% Cloths 1 Application(s) Topical daily  enoxaparin Injectable 30 milliGRAM(s) SubCutaneous every 24 hours  famotidine    Tablet 20 milliGRAM(s) Oral two times a day  gabapentin Solution 300 milliGRAM(s) Oral two times a day  hydrocortisone 1% Cream 1 Application(s) Topical daily  levETIRAcetam  Solution 750 milliGRAM(s) Oral two times a day  meropenem  IVPB 1000 milliGRAM(s) IV Intermittent every 8 hours  midodrine 5 milliGRAM(s) Oral every 8 hours  raloxifene 60 milliGRAM(s) Oral daily  scopolamine 1 mG/72 Hr(s) Patch 1 Patch Transdermal every 72 hours  senna 2 Tablet(s) Oral at bedtime  silver sulfADIAZINE 1% Cream 1 Application(s) Topical two times a day    PRN MEDICATIONS  acetaminophen     Tablet .. 650 milliGRAM(s) Oral every 6 hours PRN  aluminum hydroxide/magnesium hydroxide/simethicone Suspension 30 milliLiter(s) Oral every 4 hours PRN  melatonin 3 milliGRAM(s) Oral at bedtime PRN  ondansetron Injectable 4 milliGRAM(s) IV Push every 8 hours PRN    VITALS:   T(F): 99.9  HR: 115  BP: 96/64  RR: 17  SpO2: 92%    LABS:                        10.0   15.29 )-----------( 631      ( 02 May 2024 05:21 )             32.6     05-02    140  |  99  |  18  ----------------------------<  126<H>  4.9   |  28  |  0.5<L>    Ca    8.5      02 May 2024 05:21  Mg     2.4     05-02        Urinalysis Basic - ( 02 May 2024 05:21 )    Color: x / Appearance: x / SG: x / pH: x  Gluc: 126 mg/dL / Ketone: x  / Bili: x / Urobili: x   Blood: x / Protein: x / Nitrite: x   Leuk Esterase: x / RBC: x / WBC x   Sq Epi: x / Non Sq Epi: x / Bacteria: x                RADIOLOGY:    PHYSICAL EXAM:  GEN: ill appearing  LUNGS: congested    HEART: S1/S2 present. RRR.   ABD: Soft, non-tender, non-distended. Bowel sounds present, peg in place  EXT: contracted,  Feet with pressure related ulcers. No abscess/cellulitis  NEURO: AAOX0

## 2024-05-04 LAB
ANION GAP SERPL CALC-SCNC: 13 MMOL/L — SIGNIFICANT CHANGE UP (ref 7–14)
BASOPHILS # BLD AUTO: 0.09 K/UL — SIGNIFICANT CHANGE UP (ref 0–0.2)
BASOPHILS NFR BLD AUTO: 0.7 % — SIGNIFICANT CHANGE UP (ref 0–1)
BUN SERPL-MCNC: 19 MG/DL — SIGNIFICANT CHANGE UP (ref 10–20)
CALCIUM SERPL-MCNC: 8.6 MG/DL — SIGNIFICANT CHANGE UP (ref 8.4–10.4)
CHLORIDE SERPL-SCNC: 100 MMOL/L — SIGNIFICANT CHANGE UP (ref 98–110)
CO2 SERPL-SCNC: 26 MMOL/L — SIGNIFICANT CHANGE UP (ref 17–32)
CREAT SERPL-MCNC: 0.5 MG/DL — LOW (ref 0.7–1.5)
EGFR: 109 ML/MIN/1.73M2 — SIGNIFICANT CHANGE UP
EOSINOPHIL # BLD AUTO: 0.39 K/UL — SIGNIFICANT CHANGE UP (ref 0–0.7)
EOSINOPHIL NFR BLD AUTO: 3.1 % — SIGNIFICANT CHANGE UP (ref 0–8)
GLUCOSE BLDC GLUCOMTR-MCNC: 121 MG/DL — HIGH (ref 70–99)
GLUCOSE BLDC GLUCOMTR-MCNC: 122 MG/DL — HIGH (ref 70–99)
GLUCOSE BLDC GLUCOMTR-MCNC: 132 MG/DL — HIGH (ref 70–99)
GLUCOSE BLDC GLUCOMTR-MCNC: 158 MG/DL — HIGH (ref 70–99)
GLUCOSE SERPL-MCNC: 161 MG/DL — HIGH (ref 70–99)
HCT VFR BLD CALC: 31.1 % — LOW (ref 37–47)
HGB BLD-MCNC: 9.5 G/DL — LOW (ref 12–16)
IMM GRANULOCYTES NFR BLD AUTO: 0.9 % — HIGH (ref 0.1–0.3)
LYMPHOCYTES # BLD AUTO: 1.55 K/UL — SIGNIFICANT CHANGE UP (ref 1.2–3.4)
LYMPHOCYTES # BLD AUTO: 12.5 % — LOW (ref 20.5–51.1)
MAGNESIUM SERPL-MCNC: 2.4 MG/DL — SIGNIFICANT CHANGE UP (ref 1.8–2.4)
MCHC RBC-ENTMCNC: 28.6 PG — SIGNIFICANT CHANGE UP (ref 27–31)
MCHC RBC-ENTMCNC: 30.5 G/DL — LOW (ref 32–37)
MCV RBC AUTO: 93.7 FL — SIGNIFICANT CHANGE UP (ref 81–99)
MONOCYTES # BLD AUTO: 0.5 K/UL — SIGNIFICANT CHANGE UP (ref 0.1–0.6)
MONOCYTES NFR BLD AUTO: 4 % — SIGNIFICANT CHANGE UP (ref 1.7–9.3)
NEUTROPHILS # BLD AUTO: 9.79 K/UL — HIGH (ref 1.4–6.5)
NEUTROPHILS NFR BLD AUTO: 78.8 % — HIGH (ref 42.2–75.2)
NRBC # BLD: 0 /100 WBCS — SIGNIFICANT CHANGE UP (ref 0–0)
PLATELET # BLD AUTO: 615 K/UL — HIGH (ref 130–400)
PMV BLD: 10.5 FL — HIGH (ref 7.4–10.4)
POTASSIUM SERPL-MCNC: 4.5 MMOL/L — SIGNIFICANT CHANGE UP (ref 3.5–5)
POTASSIUM SERPL-SCNC: 4.5 MMOL/L — SIGNIFICANT CHANGE UP (ref 3.5–5)
PROCALCITONIN SERPL-MCNC: 0.16 NG/ML — HIGH (ref 0.02–0.1)
RBC # BLD: 3.32 M/UL — LOW (ref 4.2–5.4)
RBC # FLD: 22.7 % — HIGH (ref 11.5–14.5)
SODIUM SERPL-SCNC: 139 MMOL/L — SIGNIFICANT CHANGE UP (ref 135–146)
WBC # BLD: 12.43 K/UL — HIGH (ref 4.8–10.8)
WBC # FLD AUTO: 12.43 K/UL — HIGH (ref 4.8–10.8)

## 2024-05-04 PROCEDURE — 93010 ELECTROCARDIOGRAM REPORT: CPT

## 2024-05-04 PROCEDURE — 99232 SBSQ HOSP IP/OBS MODERATE 35: CPT

## 2024-05-04 RX ADMIN — GABAPENTIN 300 MILLIGRAM(S): 400 CAPSULE ORAL at 06:08

## 2024-05-04 RX ADMIN — GABAPENTIN 300 MILLIGRAM(S): 400 CAPSULE ORAL at 17:28

## 2024-05-04 RX ADMIN — Medication 1 APPLICATION(S): at 17:32

## 2024-05-04 RX ADMIN — SCOPALAMINE 1 PATCH: 1 PATCH, EXTENDED RELEASE TRANSDERMAL at 12:40

## 2024-05-04 RX ADMIN — MIDODRINE HYDROCHLORIDE 5 MILLIGRAM(S): 2.5 TABLET ORAL at 06:09

## 2024-05-04 RX ADMIN — Medication 1 DROP(S): at 06:09

## 2024-05-04 RX ADMIN — LEVETIRACETAM 750 MILLIGRAM(S): 250 TABLET, FILM COATED ORAL at 06:08

## 2024-05-04 RX ADMIN — Medication 1 APPLICATION(S): at 13:04

## 2024-05-04 RX ADMIN — Medication 1 APPLICATION(S): at 17:34

## 2024-05-04 RX ADMIN — SCOPALAMINE 1 PATCH: 1 PATCH, EXTENDED RELEASE TRANSDERMAL at 17:38

## 2024-05-04 RX ADMIN — Medication 3 MILLILITER(S): at 20:22

## 2024-05-04 RX ADMIN — MIDODRINE HYDROCHLORIDE 5 MILLIGRAM(S): 2.5 TABLET ORAL at 13:03

## 2024-05-04 RX ADMIN — CHLORHEXIDINE GLUCONATE 1 APPLICATION(S): 213 SOLUTION TOPICAL at 13:02

## 2024-05-04 RX ADMIN — Medication 3 MILLILITER(S): at 09:13

## 2024-05-04 RX ADMIN — SENNA PLUS 2 TABLET(S): 8.6 TABLET ORAL at 21:37

## 2024-05-04 RX ADMIN — MIDODRINE HYDROCHLORIDE 5 MILLIGRAM(S): 2.5 TABLET ORAL at 21:37

## 2024-05-04 RX ADMIN — Medication 650 MILLIGRAM(S): at 07:06

## 2024-05-04 RX ADMIN — Medication 650 MILLIGRAM(S): at 06:08

## 2024-05-04 RX ADMIN — FAMOTIDINE 20 MILLIGRAM(S): 10 INJECTION INTRAVENOUS at 06:08

## 2024-05-04 RX ADMIN — ENOXAPARIN SODIUM 30 MILLIGRAM(S): 100 INJECTION SUBCUTANEOUS at 13:02

## 2024-05-04 RX ADMIN — Medication 3 MILLILITER(S): at 17:29

## 2024-05-04 RX ADMIN — FAMOTIDINE 20 MILLIGRAM(S): 10 INJECTION INTRAVENOUS at 17:32

## 2024-05-04 RX ADMIN — Medication 1 APPLICATION(S): at 06:09

## 2024-05-04 RX ADMIN — RALOXIFENE HYDROCHLORIDE 60 MILLIGRAM(S): 60 TABLET, COATED ORAL at 13:02

## 2024-05-04 RX ADMIN — Medication 1 TABLET(S): at 13:02

## 2024-05-04 RX ADMIN — Medication 1 DROP(S): at 17:34

## 2024-05-04 RX ADMIN — LEVETIRACETAM 750 MILLIGRAM(S): 250 TABLET, FILM COATED ORAL at 17:29

## 2024-05-04 NOTE — PROGRESS NOTE ADULT - SUBJECTIVE AND OBJECTIVE BOX
pt seen and examined.     My notes supersede resident's notes in case of discrepancy       ROS: no cp, no sob, no n/v, no fever    Vital Signs Last 24 Hrs  T(C): 37.5 (04 May 2024 07:10), Max: 38.1 (04 May 2024 06:03)  T(F): 99.5 (04 May 2024 07:10), Max: 100.5 (04 May 2024 06:03)  HR: 93 (04 May 2024 06:03) (67 - 108)  BP: 101/59 (04 May 2024 06:03) (91/59 - 101/59)  BP(mean): --  RR: 18 (04 May 2024 06:03) (18 - 18)  SpO2: 96% (04 May 2024 06:03) (95% - 96%)    physical exam  constitutional NAD, non verbal, not able to communicate , Respiratory  lungs CTA, CVS heart RRR, GI: abdomen Soft NT, ND, BS+, skin: intact  neuro exam no focal deficit     MEDICATIONS  (STANDING):  albuterol/ipratropium for Nebulization 3 milliLiter(s) Nebulizer every 6 hours  AQUAPHOR (petrolatum Ointment) 1 Application(s) Topical two times a day  artificial  tears Solution 1 Drop(s) Both EYES two times a day  calcium carbonate   1250 mG (OsCal) 1 Tablet(s) Oral daily  chlorhexidine 2% Cloths 1 Application(s) Topical daily  enoxaparin Injectable 30 milliGRAM(s) SubCutaneous every 24 hours  famotidine    Tablet 20 milliGRAM(s) Oral two times a day  gabapentin Solution 300 milliGRAM(s) Oral two times a day  hydrocortisone 1% Cream 1 Application(s) Topical daily  levETIRAcetam  Solution 750 milliGRAM(s) Oral two times a day  midodrine 5 milliGRAM(s) Oral every 8 hours  raloxifene 60 milliGRAM(s) Oral daily  scopolamine 1 mG/72 Hr(s) Patch 1 Patch Transdermal every 72 hours  senna 2 Tablet(s) Oral at bedtime  silver sulfADIAZINE 1% Cream 1 Application(s) Topical two times a day    MEDICATIONS  (PRN):  acetaminophen     Tablet .. 650 milliGRAM(s) Oral every 6 hours PRN Temp greater or equal to 38C (100.4F), Mild Pain (1 - 3)  aluminum hydroxide/magnesium hydroxide/simethicone Suspension 30 milliLiter(s) Oral every 4 hours PRN Dyspepsia  melatonin 3 milliGRAM(s) Oral at bedtime PRN Insomnia  ondansetron Injectable 4 milliGRAM(s) IV Push every 8 hours PRN Nausea and/or Vomiting                        9.5    12.43 )-----------( 615      ( 04 May 2024 07:07 )             31.1     05-04    139  |  100  |  19  ----------------------------<  161<H>  4.5   |  26  |  0.5<L>    Ca    8.6      04 May 2024 07:07  Mg     2.4     05-04      Procalcitonin: 0.16 ng/mL [0.02 - 0.10] (05-03-24 @ 18:01)  Procalcitonin, Serum: 6.93 ng/mL [0.02 - 0.10] (04-16-24 @ 07:03)  D-Dimer Assay, Quantitative: 397 ng/mL DDU (03-13-24 @ 07:08)  Procalcitonin, Serum: 0.19 ng/mL [0.02 - 0.10] (03-13-24 @ 07:08)  Culture - Blood in AM (04.28.24 @ 07:23)    Specimen Source: .Blood None   Culture Results:   No growth at 5 days    a/p  57yo F w/ pmhx of Down's syndrome, cerebral palsy, seizure disorder presents from [facility] for respiratory distress. Admitted to SDU for severe sepsis and acute hypoxemic respiratory failure likely secondary to recurrent CAP.     # Acute hypoxemic respiratory failure   #Severe sepsis with septic shock poa  persistent fever, fu ID   dw ID, Dr mcmillan recommends dc abx and repeat bc off abx,   wbc is improved today compared to yesterday ( off abx) , fever still present but max 100.5     #possible recurrent CAP   #chronic hypotension   - CXR: possible basilar opacities  - covid/RSV, MSRA, negative; Bl cx with contaminant   - C/w Midodrine 5mg  - target MAP 60 ( Patient always has BP on the softer side )   - blood cx 4/15: staph capitis  - blood cx 4/15 and 4/17: NGTD  - Scopolamine for secretions. aspiration precautions   - Palliative care following   - 4/22 and 4/23: spiked fever and WBC ;WBC downtrending (10 on 4/26)  - Continue Meropenem (4/15 - )  - F/u bcx 4/23 -> no growth to date  - CT chest angio w/ contrast 4/24: bilateral opacities   - currently on 4L facemask  - 4/27 AM temp 101  - 4/28 >> Tmax 102.2   - 4/29 >> Tmax 99.4  -  fungitell  >> nEG   -Blood cx >> NGTD 04/28  - Follow up ID    #Episodes of Vomiting:   - On PEG feed   - Aspiration risk ; C Xray done   - Can restart the PEG feeding for now     # B/l feet ulcers  - No abscess/cellulitis  - colonized with ESBL strain  - Off loading to prevent pressure sores   - wound care following     #seizure disorder  #Down syndrome  #Nonverbal bedbound  # PEG tube   - c/w home meds    #Progress Note Handoff    Pending :  ID followup, followup bc   Family discussion: will need meeting with group home admin prior to return   Disposition: group home   code status: dnr, dni . poor prognosis

## 2024-05-05 LAB
ANION GAP SERPL CALC-SCNC: 14 MMOL/L — SIGNIFICANT CHANGE UP (ref 7–14)
BASOPHILS # BLD AUTO: 0.08 K/UL — SIGNIFICANT CHANGE UP (ref 0–0.2)
BASOPHILS NFR BLD AUTO: 0.5 % — SIGNIFICANT CHANGE UP (ref 0–1)
BUN SERPL-MCNC: 20 MG/DL — SIGNIFICANT CHANGE UP (ref 10–20)
CALCIUM SERPL-MCNC: 8.5 MG/DL — SIGNIFICANT CHANGE UP (ref 8.4–10.5)
CHLORIDE SERPL-SCNC: 100 MMOL/L — SIGNIFICANT CHANGE UP (ref 98–110)
CO2 SERPL-SCNC: 26 MMOL/L — SIGNIFICANT CHANGE UP (ref 17–32)
CREAT SERPL-MCNC: 0.5 MG/DL — LOW (ref 0.7–1.5)
EGFR: 109 ML/MIN/1.73M2 — SIGNIFICANT CHANGE UP
EOSINOPHIL # BLD AUTO: 0.12 K/UL — SIGNIFICANT CHANGE UP (ref 0–0.7)
EOSINOPHIL NFR BLD AUTO: 0.7 % — SIGNIFICANT CHANGE UP (ref 0–8)
GLUCOSE BLDC GLUCOMTR-MCNC: 138 MG/DL — HIGH (ref 70–99)
GLUCOSE BLDC GLUCOMTR-MCNC: 160 MG/DL — HIGH (ref 70–99)
GLUCOSE SERPL-MCNC: 199 MG/DL — HIGH (ref 70–99)
HCT VFR BLD CALC: 32.2 % — LOW (ref 37–47)
HGB BLD-MCNC: 9.8 G/DL — LOW (ref 12–16)
IMM GRANULOCYTES NFR BLD AUTO: 0.7 % — HIGH (ref 0.1–0.3)
LYMPHOCYTES # BLD AUTO: 1.72 K/UL — SIGNIFICANT CHANGE UP (ref 1.2–3.4)
LYMPHOCYTES # BLD AUTO: 10.6 % — LOW (ref 20.5–51.1)
MAGNESIUM SERPL-MCNC: 2.5 MG/DL — HIGH (ref 1.8–2.4)
MCHC RBC-ENTMCNC: 28.7 PG — SIGNIFICANT CHANGE UP (ref 27–31)
MCHC RBC-ENTMCNC: 30.4 G/DL — LOW (ref 32–37)
MCV RBC AUTO: 94.4 FL — SIGNIFICANT CHANGE UP (ref 81–99)
MONOCYTES # BLD AUTO: 0.63 K/UL — HIGH (ref 0.1–0.6)
MONOCYTES NFR BLD AUTO: 3.9 % — SIGNIFICANT CHANGE UP (ref 1.7–9.3)
NEUTROPHILS # BLD AUTO: 13.54 K/UL — HIGH (ref 1.4–6.5)
NEUTROPHILS NFR BLD AUTO: 83.6 % — HIGH (ref 42.2–75.2)
NRBC # BLD: 0 /100 WBCS — SIGNIFICANT CHANGE UP (ref 0–0)
PLATELET # BLD AUTO: 701 K/UL — HIGH (ref 130–400)
PMV BLD: 10.2 FL — SIGNIFICANT CHANGE UP (ref 7.4–10.4)
POTASSIUM SERPL-MCNC: 4.7 MMOL/L — SIGNIFICANT CHANGE UP (ref 3.5–5)
POTASSIUM SERPL-SCNC: 4.7 MMOL/L — SIGNIFICANT CHANGE UP (ref 3.5–5)
RBC # BLD: 3.41 M/UL — LOW (ref 4.2–5.4)
RBC # FLD: 23 % — HIGH (ref 11.5–14.5)
SODIUM SERPL-SCNC: 140 MMOL/L — SIGNIFICANT CHANGE UP (ref 135–146)
WBC # BLD: 16.2 K/UL — HIGH (ref 4.8–10.8)
WBC # FLD AUTO: 16.2 K/UL — HIGH (ref 4.8–10.8)

## 2024-05-05 PROCEDURE — 99232 SBSQ HOSP IP/OBS MODERATE 35: CPT

## 2024-05-05 PROCEDURE — 93010 ELECTROCARDIOGRAM REPORT: CPT

## 2024-05-05 RX ORDER — IBUPROFEN 200 MG
400 TABLET ORAL ONCE
Refills: 0 | Status: COMPLETED | OUTPATIENT
Start: 2024-05-05 | End: 2024-05-05

## 2024-05-05 RX ADMIN — SCOPALAMINE 1 PATCH: 1 PATCH, EXTENDED RELEASE TRANSDERMAL at 17:55

## 2024-05-05 RX ADMIN — ENOXAPARIN SODIUM 30 MILLIGRAM(S): 100 INJECTION SUBCUTANEOUS at 13:37

## 2024-05-05 RX ADMIN — Medication 1 DROP(S): at 05:57

## 2024-05-05 RX ADMIN — SCOPALAMINE 1 PATCH: 1 PATCH, EXTENDED RELEASE TRANSDERMAL at 07:50

## 2024-05-05 RX ADMIN — Medication 1 TABLET(S): at 13:37

## 2024-05-05 RX ADMIN — FAMOTIDINE 20 MILLIGRAM(S): 10 INJECTION INTRAVENOUS at 17:42

## 2024-05-05 RX ADMIN — Medication 1 APPLICATION(S): at 17:49

## 2024-05-05 RX ADMIN — Medication 1 DROP(S): at 17:43

## 2024-05-05 RX ADMIN — CHLORHEXIDINE GLUCONATE 1 APPLICATION(S): 213 SOLUTION TOPICAL at 13:36

## 2024-05-05 RX ADMIN — RALOXIFENE HYDROCHLORIDE 60 MILLIGRAM(S): 60 TABLET, COATED ORAL at 13:36

## 2024-05-05 RX ADMIN — Medication 650 MILLIGRAM(S): at 18:44

## 2024-05-05 RX ADMIN — GABAPENTIN 300 MILLIGRAM(S): 400 CAPSULE ORAL at 05:57

## 2024-05-05 RX ADMIN — FAMOTIDINE 20 MILLIGRAM(S): 10 INJECTION INTRAVENOUS at 05:56

## 2024-05-05 RX ADMIN — Medication 400 MILLIGRAM(S): at 20:34

## 2024-05-05 RX ADMIN — Medication 3 MILLILITER(S): at 19:46

## 2024-05-05 RX ADMIN — SENNA PLUS 2 TABLET(S): 8.6 TABLET ORAL at 22:08

## 2024-05-05 RX ADMIN — LEVETIRACETAM 750 MILLIGRAM(S): 250 TABLET, FILM COATED ORAL at 17:42

## 2024-05-05 RX ADMIN — GABAPENTIN 300 MILLIGRAM(S): 400 CAPSULE ORAL at 17:42

## 2024-05-05 RX ADMIN — LEVETIRACETAM 750 MILLIGRAM(S): 250 TABLET, FILM COATED ORAL at 05:56

## 2024-05-05 RX ADMIN — Medication 400 MILLIGRAM(S): at 21:34

## 2024-05-05 RX ADMIN — Medication 1 APPLICATION(S): at 13:36

## 2024-05-05 RX ADMIN — Medication 3 MILLILITER(S): at 13:31

## 2024-05-05 RX ADMIN — Medication 1 APPLICATION(S): at 05:57

## 2024-05-05 RX ADMIN — MIDODRINE HYDROCHLORIDE 5 MILLIGRAM(S): 2.5 TABLET ORAL at 22:08

## 2024-05-05 RX ADMIN — MIDODRINE HYDROCHLORIDE 5 MILLIGRAM(S): 2.5 TABLET ORAL at 13:37

## 2024-05-05 RX ADMIN — Medication 1 APPLICATION(S): at 17:43

## 2024-05-05 RX ADMIN — Medication 3 MILLILITER(S): at 07:38

## 2024-05-05 RX ADMIN — MIDODRINE HYDROCHLORIDE 5 MILLIGRAM(S): 2.5 TABLET ORAL at 05:57

## 2024-05-05 NOTE — PROGRESS NOTE ADULT - SUBJECTIVE AND OBJECTIVE BOX
pt seen and examined.     My notes supersede resident's notes in case of discrepancy       ROS: no cp, no sob, no n/v, no fever    Vital Signs Last 24 Hrs  T(C): 36.8 (05 May 2024 05:28), Max: 37.3 (04 May 2024 17:36)  T(F): 98.3 (05 May 2024 05:28), Max: 99.2 (04 May 2024 17:36)  HR: 132 (05 May 2024 06:15) (64 - 132)  BP: 99/59 (05 May 2024 06:15) (93/57 - 118/37)  RR: 18 (05 May 2024 06:15) (18 - 18)  SpO2: 91% (05 May 2024 06:15) (91% - 100%)    physical exam  constitutional NAD, awake, non verbal, contracted ext , Respiratory  lungs CTA, CVS heart RRR, GI: abdomen Soft NT, ND, BS+, peg in place, site is cleaned   neuro exam cannot participate     MEDICATIONS  (STANDING):  albuterol/ipratropium for Nebulization 3 milliLiter(s) Nebulizer every 6 hours  AQUAPHOR (petrolatum Ointment) 1 Application(s) Topical two times a day  artificial  tears Solution 1 Drop(s) Both EYES two times a day  calcium carbonate   1250 mG (OsCal) 1 Tablet(s) Oral daily  chlorhexidine 2% Cloths 1 Application(s) Topical daily  enoxaparin Injectable 30 milliGRAM(s) SubCutaneous every 24 hours  famotidine    Tablet 20 milliGRAM(s) Oral two times a day  gabapentin Solution 300 milliGRAM(s) Oral two times a day  hydrocortisone 1% Cream 1 Application(s) Topical daily  levETIRAcetam  Solution 750 milliGRAM(s) Oral two times a day  midodrine 5 milliGRAM(s) Oral every 8 hours  raloxifene 60 milliGRAM(s) Oral daily  scopolamine 1 mG/72 Hr(s) Patch 1 Patch Transdermal every 72 hours  senna 2 Tablet(s) Oral at bedtime  silver sulfADIAZINE 1% Cream 1 Application(s) Topical two times a day    MEDICATIONS  (PRN):  acetaminophen     Tablet .. 650 milliGRAM(s) Oral every 6 hours PRN Temp greater or equal to 38C (100.4F), Mild Pain (1 - 3)  aluminum hydroxide/magnesium hydroxide/simethicone Suspension 30 milliLiter(s) Oral every 4 hours PRN Dyspepsia  melatonin 3 milliGRAM(s) Oral at bedtime PRN Insomnia  ondansetron Injectable 4 milliGRAM(s) IV Push every 8 hours PRN Nausea and/or Vomiting                            9.8    16.20 )-----------( 701      ( 05 May 2024 08:28 )             32.2     05-05    140  |  100  |  20  ----------------------------<  199<H>  4.7   |  26  |  0.5<L>    Ca    8.5      05 May 2024 08:28  Mg     2.5     05-05      Procalcitonin: 0.16 ng/mL [0.02 - 0.10] (05-03-24 @ 18:01)  Procalcitonin, Serum: 6.93 ng/mL [0.02 - 0.10] (04-16-24 @ 07:03)  D-Dimer Assay, Quantitative: 397 ng/mL DDU (03-13-24 @ 07:08)  Procalcitonin, Serum: 0.19 ng/mL [0.02 - 0.10] (03-13-24 @ 07:08)    Culture - Blood (collected 03 May 2024 18:00)  Source: .Blood Blood  Preliminary Report (04 May 2024 23:10):    No growth at 24 hours    < from: Xray Chest 1 View- PORTABLE-Urgent (Xray Chest 1 View- PORTABLE-Urgent .) (05.02.24 @ 15:49) >  Bilateral opacifications,stable.    < end of copied text >    a/p  59yo F w/ pmhx of Down's syndrome, cerebral palsy, seizure disorder presents from [facility] for respiratory distress. Admitted to SDU for severe sepsis and acute hypoxemic respiratory failure likely secondary to recurrent CAP.     # Acute hypoxemic respiratory failure   #Severe sepsis with septic shock poa  now bp is stable ( on midodrine) off abx   persistent fever, fu ID   dw ID, Dr mcmillan recommends dc abx and repeat bc off abx,   wbc is improved today compared to yesterday ( off abx) , fever still present but max 100.5     #possible recurrent pneumonia /suspected aspiration pna ( despite having peg )   #chronic hypotension   - CXR: possible basilar opacities  - covid/RSV, MSRA, negative; Bl cx with contaminant   - C/w Midodrine 5mg  - target MAP 60 ( Patient always has BP on the softer side )   - blood cx 4/15: staph capitis  - blood cx 4/15 and 4/17: NGTD  - Scopolamine for secretions. aspiration precautions   - Palliative care following   - 4/22 and 4/23: spiked fever and WBC ;WBC downtrending (10 on 4/26)  - Continue Meropenem (4/15 - )  - F/u bcx 4/23 -> no growth to date  - CT chest angio w/ contrast 4/24: bilateral opacities   - currently on 4L facemask  -  fungitell  >> nEG   -Blood cx >> NGTD 04/28  - Follow up ID    #Episodes of Vomiting:   - On PEG feed   - Aspiration risk ; C Xray done   - Can restart the PEG feeding for now     # B/l feet ulcers  - No abscess/cellulitis  - colonized with ESBL strain  - Off loading to prevent pressure sores   - wound care following     #seizure disorder, cont meds   #Down syndrome  #Nonverbal bedbound  # PEG tube   - c/w home meds    #Progress Note Handoff    Pending :  ID followup, followup bc and labs   Family discussion: will need meeting with group home admin prior to return   Disposition: group home   code status: dnr, dni . poor prognosis

## 2024-05-06 LAB
ALBUMIN SERPL ELPH-MCNC: 2.7 G/DL — LOW (ref 3.5–5.2)
ALP SERPL-CCNC: 83 U/L — SIGNIFICANT CHANGE UP (ref 30–115)
ALT FLD-CCNC: 25 U/L — SIGNIFICANT CHANGE UP (ref 0–41)
ANION GAP SERPL CALC-SCNC: 12 MMOL/L — SIGNIFICANT CHANGE UP (ref 7–14)
AST SERPL-CCNC: 14 U/L — SIGNIFICANT CHANGE UP (ref 0–41)
BASOPHILS # BLD AUTO: 0.05 K/UL — SIGNIFICANT CHANGE UP (ref 0–0.2)
BASOPHILS NFR BLD AUTO: 0.3 % — SIGNIFICANT CHANGE UP (ref 0–1)
BILIRUB SERPL-MCNC: <0.2 MG/DL — SIGNIFICANT CHANGE UP (ref 0.2–1.2)
BUN SERPL-MCNC: 27 MG/DL — HIGH (ref 10–20)
CALCIUM SERPL-MCNC: 8.4 MG/DL — SIGNIFICANT CHANGE UP (ref 8.4–10.5)
CHLORIDE SERPL-SCNC: 99 MMOL/L — SIGNIFICANT CHANGE UP (ref 98–110)
CO2 SERPL-SCNC: 25 MMOL/L — SIGNIFICANT CHANGE UP (ref 17–32)
CREAT SERPL-MCNC: 0.8 MG/DL — SIGNIFICANT CHANGE UP (ref 0.7–1.5)
EGFR: 86 ML/MIN/1.73M2 — SIGNIFICANT CHANGE UP
EOSINOPHIL # BLD AUTO: 0 K/UL — SIGNIFICANT CHANGE UP (ref 0–0.7)
EOSINOPHIL NFR BLD AUTO: 0 % — SIGNIFICANT CHANGE UP (ref 0–8)
FUNGITELL: 86 PG/ML — HIGH
GLUCOSE BLDC GLUCOMTR-MCNC: 143 MG/DL — HIGH (ref 70–99)
GLUCOSE BLDC GLUCOMTR-MCNC: 164 MG/DL — HIGH (ref 70–99)
GLUCOSE BLDC GLUCOMTR-MCNC: 166 MG/DL — HIGH (ref 70–99)
GLUCOSE SERPL-MCNC: 192 MG/DL — HIGH (ref 70–99)
HCT VFR BLD CALC: 29.1 % — LOW (ref 37–47)
HGB BLD-MCNC: 9 G/DL — LOW (ref 12–16)
IMM GRANULOCYTES NFR BLD AUTO: 0.5 % — HIGH (ref 0.1–0.3)
LACTATE SERPL-SCNC: 1.5 MMOL/L — SIGNIFICANT CHANGE UP (ref 0.7–2)
LACTATE SERPL-SCNC: 2.7 MMOL/L — HIGH (ref 0.7–2)
LACTATE SERPL-SCNC: 3 MMOL/L — HIGH (ref 0.7–2)
LYMPHOCYTES # BLD AUTO: 1.26 K/UL — SIGNIFICANT CHANGE UP (ref 1.2–3.4)
LYMPHOCYTES # BLD AUTO: 7 % — LOW (ref 20.5–51.1)
MAGNESIUM SERPL-MCNC: 2.4 MG/DL — SIGNIFICANT CHANGE UP (ref 1.8–2.4)
MCHC RBC-ENTMCNC: 28.8 PG — SIGNIFICANT CHANGE UP (ref 27–31)
MCHC RBC-ENTMCNC: 30.9 G/DL — LOW (ref 32–37)
MCV RBC AUTO: 93.3 FL — SIGNIFICANT CHANGE UP (ref 81–99)
MONOCYTES # BLD AUTO: 0.66 K/UL — HIGH (ref 0.1–0.6)
MONOCYTES NFR BLD AUTO: 3.6 % — SIGNIFICANT CHANGE UP (ref 1.7–9.3)
NEUTROPHILS # BLD AUTO: 16.03 K/UL — HIGH (ref 1.4–6.5)
NEUTROPHILS NFR BLD AUTO: 88.6 % — HIGH (ref 42.2–75.2)
NRBC # BLD: 0 /100 WBCS — SIGNIFICANT CHANGE UP (ref 0–0)
PLATELET # BLD AUTO: 590 K/UL — HIGH (ref 130–400)
PMV BLD: 9.9 FL — SIGNIFICANT CHANGE UP (ref 7.4–10.4)
POTASSIUM SERPL-MCNC: 4.7 MMOL/L — SIGNIFICANT CHANGE UP (ref 3.5–5)
POTASSIUM SERPL-SCNC: 4.7 MMOL/L — SIGNIFICANT CHANGE UP (ref 3.5–5)
PROT SERPL-MCNC: 5.8 G/DL — LOW (ref 6–8)
RBC # BLD: 3.12 M/UL — LOW (ref 4.2–5.4)
RBC # FLD: 22.9 % — HIGH (ref 11.5–14.5)
SODIUM SERPL-SCNC: 136 MMOL/L — SIGNIFICANT CHANGE UP (ref 135–146)
WBC # BLD: 18.09 K/UL — HIGH (ref 4.8–10.8)
WBC # FLD AUTO: 18.09 K/UL — HIGH (ref 4.8–10.8)

## 2024-05-06 PROCEDURE — 71045 X-RAY EXAM CHEST 1 VIEW: CPT | Mod: 26,76

## 2024-05-06 PROCEDURE — 74177 CT ABD & PELVIS W/CONTRAST: CPT | Mod: 26

## 2024-05-06 PROCEDURE — 99232 SBSQ HOSP IP/OBS MODERATE 35: CPT

## 2024-05-06 PROCEDURE — 71260 CT THORAX DX C+: CPT | Mod: 26

## 2024-05-06 RX ORDER — MEROPENEM 1 G/30ML
1000 INJECTION INTRAVENOUS EVERY 8 HOURS
Refills: 0 | Status: COMPLETED | OUTPATIENT
Start: 2024-05-06 | End: 2024-05-13

## 2024-05-06 RX ORDER — MEROPENEM 1 G/30ML
1000 INJECTION INTRAVENOUS ONCE
Refills: 0 | Status: COMPLETED | OUTPATIENT
Start: 2024-05-06 | End: 2024-05-06

## 2024-05-06 RX ORDER — IBUPROFEN 200 MG
400 TABLET ORAL EVERY 6 HOURS
Refills: 0 | Status: DISCONTINUED | OUTPATIENT
Start: 2024-05-06 | End: 2024-05-15

## 2024-05-06 RX ORDER — SODIUM CHLORIDE 9 MG/ML
500 INJECTION, SOLUTION INTRAVENOUS ONCE
Refills: 0 | Status: COMPLETED | OUTPATIENT
Start: 2024-05-06 | End: 2024-05-06

## 2024-05-06 RX ORDER — SODIUM CHLORIDE 9 MG/ML
1000 INJECTION, SOLUTION INTRAVENOUS ONCE
Refills: 0 | Status: COMPLETED | OUTPATIENT
Start: 2024-05-06 | End: 2024-05-06

## 2024-05-06 RX ORDER — VANCOMYCIN HCL 1 G
1000 VIAL (EA) INTRAVENOUS ONCE
Refills: 0 | Status: COMPLETED | OUTPATIENT
Start: 2024-05-06 | End: 2024-05-06

## 2024-05-06 RX ORDER — ACETAMINOPHEN 500 MG
650 TABLET ORAL ONCE
Refills: 0 | Status: COMPLETED | OUTPATIENT
Start: 2024-05-06 | End: 2024-05-06

## 2024-05-06 RX ADMIN — CHLORHEXIDINE GLUCONATE 1 APPLICATION(S): 213 SOLUTION TOPICAL at 11:54

## 2024-05-06 RX ADMIN — Medication 3 MILLILITER(S): at 13:40

## 2024-05-06 RX ADMIN — MIDODRINE HYDROCHLORIDE 5 MILLIGRAM(S): 2.5 TABLET ORAL at 22:02

## 2024-05-06 RX ADMIN — SCOPALAMINE 1 PATCH: 1 PATCH, EXTENDED RELEASE TRANSDERMAL at 06:46

## 2024-05-06 RX ADMIN — Medication 250 MILLIGRAM(S): at 07:51

## 2024-05-06 RX ADMIN — SCOPALAMINE 1 PATCH: 1 PATCH, EXTENDED RELEASE TRANSDERMAL at 06:17

## 2024-05-06 RX ADMIN — MIDODRINE HYDROCHLORIDE 5 MILLIGRAM(S): 2.5 TABLET ORAL at 13:40

## 2024-05-06 RX ADMIN — FAMOTIDINE 20 MILLIGRAM(S): 10 INJECTION INTRAVENOUS at 06:47

## 2024-05-06 RX ADMIN — MIDODRINE HYDROCHLORIDE 5 MILLIGRAM(S): 2.5 TABLET ORAL at 06:47

## 2024-05-06 RX ADMIN — SCOPALAMINE 1 PATCH: 1 PATCH, EXTENDED RELEASE TRANSDERMAL at 07:37

## 2024-05-06 RX ADMIN — RALOXIFENE HYDROCHLORIDE 60 MILLIGRAM(S): 60 TABLET, COATED ORAL at 11:53

## 2024-05-06 RX ADMIN — MEROPENEM 100 MILLIGRAM(S): 1 INJECTION INTRAVENOUS at 22:02

## 2024-05-06 RX ADMIN — MEROPENEM 100 MILLIGRAM(S): 1 INJECTION INTRAVENOUS at 07:51

## 2024-05-06 RX ADMIN — Medication 650 MILLIGRAM(S): at 12:14

## 2024-05-06 RX ADMIN — Medication 1 DROP(S): at 06:49

## 2024-05-06 RX ADMIN — Medication 650 MILLIGRAM(S): at 12:45

## 2024-05-06 RX ADMIN — Medication 1 APPLICATION(S): at 06:48

## 2024-05-06 RX ADMIN — GABAPENTIN 300 MILLIGRAM(S): 400 CAPSULE ORAL at 06:47

## 2024-05-06 RX ADMIN — Medication 1 TABLET(S): at 11:53

## 2024-05-06 RX ADMIN — Medication 650 MILLIGRAM(S): at 01:00

## 2024-05-06 RX ADMIN — Medication 1 APPLICATION(S): at 17:26

## 2024-05-06 RX ADMIN — LEVETIRACETAM 750 MILLIGRAM(S): 250 TABLET, FILM COATED ORAL at 17:22

## 2024-05-06 RX ADMIN — FAMOTIDINE 20 MILLIGRAM(S): 10 INJECTION INTRAVENOUS at 17:23

## 2024-05-06 RX ADMIN — Medication 1 DROP(S): at 17:52

## 2024-05-06 RX ADMIN — Medication 1 APPLICATION(S): at 06:47

## 2024-05-06 RX ADMIN — Medication 260 MILLIGRAM(S): at 00:25

## 2024-05-06 RX ADMIN — LEVETIRACETAM 750 MILLIGRAM(S): 250 TABLET, FILM COATED ORAL at 06:49

## 2024-05-06 RX ADMIN — Medication 1 APPLICATION(S): at 12:00

## 2024-05-06 RX ADMIN — ENOXAPARIN SODIUM 30 MILLIGRAM(S): 100 INJECTION SUBCUTANEOUS at 13:40

## 2024-05-06 RX ADMIN — MEROPENEM 100 MILLIGRAM(S): 1 INJECTION INTRAVENOUS at 17:20

## 2024-05-06 RX ADMIN — GABAPENTIN 300 MILLIGRAM(S): 400 CAPSULE ORAL at 17:52

## 2024-05-06 RX ADMIN — SCOPALAMINE 1 PATCH: 1 PATCH, EXTENDED RELEASE TRANSDERMAL at 19:36

## 2024-05-06 RX ADMIN — Medication 1 APPLICATION(S): at 17:28

## 2024-05-06 RX ADMIN — Medication 3 MILLILITER(S): at 20:19

## 2024-05-06 RX ADMIN — SODIUM CHLORIDE 1000 MILLILITER(S): 9 INJECTION, SOLUTION INTRAVENOUS at 11:58

## 2024-05-06 RX ADMIN — Medication 3 MILLILITER(S): at 07:53

## 2024-05-06 RX ADMIN — SODIUM CHLORIDE 1000 MILLILITER(S): 9 INJECTION, SOLUTION INTRAVENOUS at 03:37

## 2024-05-06 NOTE — PROGRESS NOTE ADULT - SUBJECTIVE AND OBJECTIVE BOX
JERICHOTEA  58y, Female    All available historical data reviewed    OVERNIGHT EVENTS:  fevers  NC 4 lit    ROS:  unable to obtain history secondary to patient's mental status     VITALS:  T(F): 100.4, Max: 101.3 (05-05-24 @ 19:24)  HR: 121  BP: 93/53  RR: 18Vital Signs Last 24 Hrs  T(C): 38 (06 May 2024 06:45), Max: 38.5 (05 May 2024 19:24)  T(F): 100.4 (06 May 2024 06:45), Max: 101.3 (05 May 2024 19:24)  HR: 121 (06 May 2024 06:45) (121 - 143)  BP: 93/53 (06 May 2024 06:45) (86/55 - 104/70)  BP(mean): --  RR: 18 (06 May 2024 03:15) (18 - 19)  SpO2: 92% (06 May 2024 03:15) (92% - 97%)    Parameters below as of 06 May 2024 03:15  Patient On (Oxygen Delivery Method): nasal cannula  O2 Flow (L/min): 4      TESTS & MEASUREMENTS:                        9.0    18.09 )-----------( 590      ( 06 May 2024 04:20 )             29.1     05-06    136  |  99  |  27<H>  ----------------------------<  192<H>  4.7   |  25  |  0.8    Ca    8.4      06 May 2024 04:20  Mg     2.4     05-06    TPro  5.8<L>  /  Alb  2.7<L>  /  TBili  <0.2  /  DBili  x   /  AST  14  /  ALT  25  /  AlkPhos  83  05-06    LIVER FUNCTIONS - ( 06 May 2024 04:20 )  Alb: 2.7 g/dL / Pro: 5.8 g/dL / ALK PHOS: 83 U/L / ALT: 25 U/L / AST: 14 U/L / GGT: x             Culture - Blood (collected 05-04-24 @ 07:07)  Source: .Blood None  Preliminary Report (05-05-24 @ 18:01):    No growth at 24 hours    Culture - Blood (collected 05-03-24 @ 18:00)  Source: .Blood Blood  Preliminary Report (05-05-24 @ 23:01):    No growth at 48 Hours      Urinalysis Basic - ( 06 May 2024 04:20 )    Color: x / Appearance: x / SG: x / pH: x  Gluc: 192 mg/dL / Ketone: x  / Bili: x / Urobili: x   Blood: x / Protein: x / Nitrite: x   Leuk Esterase: x / RBC: x / WBC x   Sq Epi: x / Non Sq Epi: x / Bacteria: x          RADIOLOGY & ADDITIONAL TESTS:  Personal review of radiological diagnostics performed  Echo and EKG results noted when applicable.     MEDICATIONS:  acetaminophen     Tablet .. 650 milliGRAM(s) Oral every 6 hours PRN  albuterol/ipratropium for Nebulization 3 milliLiter(s) Nebulizer every 6 hours  aluminum hydroxide/magnesium hydroxide/simethicone Suspension 30 milliLiter(s) Oral every 4 hours PRN  AQUAPHOR (petrolatum Ointment) 1 Application(s) Topical two times a day  artificial  tears Solution 1 Drop(s) Both EYES two times a day  calcium carbonate   1250 mG (OsCal) 1 Tablet(s) Oral daily  chlorhexidine 2% Cloths 1 Application(s) Topical daily  enoxaparin Injectable 30 milliGRAM(s) SubCutaneous every 24 hours  famotidine    Tablet 20 milliGRAM(s) Oral two times a day  gabapentin Solution 300 milliGRAM(s) Oral two times a day  hydrocortisone 1% Cream 1 Application(s) Topical daily  ibuprofen  Tablet. 400 milliGRAM(s) Oral every 6 hours PRN  lactated ringers Bolus 500 milliLiter(s) IV Bolus once  levETIRAcetam  Solution 750 milliGRAM(s) Oral two times a day  melatonin 3 milliGRAM(s) Oral at bedtime PRN  midodrine 5 milliGRAM(s) Oral every 8 hours  ondansetron Injectable 4 milliGRAM(s) IV Push every 8 hours PRN  raloxifene 60 milliGRAM(s) Oral daily  scopolamine 1 mG/72 Hr(s) Patch 1 Patch Transdermal every 72 hours  senna 2 Tablet(s) Oral at bedtime  silver sulfADIAZINE 1% Cream 1 Application(s) Topical two times a day      ANTIBIOTICS:

## 2024-05-06 NOTE — PROGRESS NOTE ADULT - ASSESSMENT
· Assessment	  58 MARÍA w a PMH of Trisomy 21, a recent admission [id number] for RSV PNA w MICU stay (never intubated), nonverbal at baseline, hypotension, cerebral palsey w seizure disorder, ESBL isolated from ulcer of rt foot 11/23 BIBEMS from \[facility]\"" for respiratory distress and high fever. Patient SOA w , RR 22 and febrile to 102.8 rectally. Patient also hypotensive to 82/51. Labs notable for leukocytosis of 12.25 w 3.6% bands, large platelets and a compensated Respiratory acidosis. Imaging notable for a Rt Mid lobe ground glass opacification. Patient MRSA Negative w RVP negative for COVID, RSV and Flu.     IMPRESSION/RECOMMENDATIONS  Aspiration with suspected GNR  pneumonia  CXR increased opacities right  4/28,5/3,4  BCx NG  WBC 18  Fungitell assay 53 on 4/24 4/24 CT no PE, multifocal opacities  4/17,19,21, 23 BCx NG  Immunosuppression could result in poor clinical outcome.   Nares ORSA NG  RVP NG  4/15 Bcx 1/2 CoNS > not a true pathogen  Was colonized with ESBL strain in feet wounds  Nares ORSA NG  COVID 19/ Influenza/ RSV NG.   Feet with pressure related ulcers. No abscess/cellulitis    -consider CT chest / A/P  -meropenem 1 gm iv q8h

## 2024-05-06 NOTE — PROGRESS NOTE ADULT - SUBJECTIVE AND OBJECTIVE BOX
24H events:    Patient is a 58y old Female who presents with a chief complaint of Pneumonia (05 May 2024 11:57)    Primary diagnosis of Acute sepsis  Today is hospital day 21d. This morning patient was seen and examined at bedside    overnight patient was tachy to 140s, hypotensive (around baseline), febrile to 38.5, given 1L LR bolus   given 1x danna and vanco dose   bcx pending from this am pre-abx dose   and bcx pending off abx from yesterday 5/5/24  Cxr suspicious for aspiration pna   ID f/u regarding abx plan       Code Status:    Family communication:  Contact date:  Name of person contacted:  Relationship to patient:  Communication details:  What matters most:    PAST MEDICAL & SURGICAL HISTORY  Down syndrome    Osteoporosis    Mild anemia    Neuropathy    S/P debridement  of R hip on 3/2/21      SOCIAL HISTORY:  Social History:      ALLERGIES:  No Known Allergies    MEDICATIONS:  STANDING MEDICATIONS  albuterol/ipratropium for Nebulization 3 milliLiter(s) Nebulizer every 6 hours  AQUAPHOR (petrolatum Ointment) 1 Application(s) Topical two times a day  artificial  tears Solution 1 Drop(s) Both EYES two times a day  calcium carbonate   1250 mG (OsCal) 1 Tablet(s) Oral daily  chlorhexidine 2% Cloths 1 Application(s) Topical daily  enoxaparin Injectable 30 milliGRAM(s) SubCutaneous every 24 hours  famotidine    Tablet 20 milliGRAM(s) Oral two times a day  gabapentin Solution 300 milliGRAM(s) Oral two times a day  hydrocortisone 1% Cream 1 Application(s) Topical daily  lactated ringers Bolus 500 milliLiter(s) IV Bolus once  levETIRAcetam  Solution 750 milliGRAM(s) Oral two times a day  midodrine 5 milliGRAM(s) Oral every 8 hours  raloxifene 60 milliGRAM(s) Oral daily  scopolamine 1 mG/72 Hr(s) Patch 1 Patch Transdermal every 72 hours  senna 2 Tablet(s) Oral at bedtime  silver sulfADIAZINE 1% Cream 1 Application(s) Topical two times a day    PRN MEDICATIONS  acetaminophen     Tablet .. 650 milliGRAM(s) Oral every 6 hours PRN  aluminum hydroxide/magnesium hydroxide/simethicone Suspension 30 milliLiter(s) Oral every 4 hours PRN  ibuprofen  Tablet. 400 milliGRAM(s) Oral every 6 hours PRN  melatonin 3 milliGRAM(s) Oral at bedtime PRN  ondansetron Injectable 4 milliGRAM(s) IV Push every 8 hours PRN    VITALS:   T(F): 100.4  HR: 121  BP: 93/53  RR: 18  SpO2: 92%    PHYSICAL EXAM:  GENERAL: ill apprearing, non verbal  HEAD: NCAD, no hematoma or laceration   NECK: Supple, no jvd  HEART: tachy, regular rhythm , no murmur   LUNGS: decrease bs at right base, coarse rhonchi upper airway  ABDOMEN:  soft, non-tender, peg-tube present  EXTREMITIES: b/l UE and LE contracted   NERVOUS SYSTEM: unable to follow commands         (  ) Indwelling Madsen Catheter:   Date insterted:    Reason (  ) Critical illness     (  ) urinary retention    (  ) Accurate Ins/Outs Monitoring     (  ) CMO patient    (  ) Central Line:   Date inserted:  Location: (  ) Right IJ     (  ) Left IJ     (  ) Right Fem     (  ) Left Fem    (  ) SPC        (  ) pigtail       (  ) PEG tube       (  ) colostomy       (  ) jejunostomy  (  ) U-Dall    LABS:                        9.0    18.09 )-----------( 590      ( 06 May 2024 04:20 )             29.1     05-06    136  |  99  |  27<H>  ----------------------------<  192<H>  4.7   |  25  |  0.8    Ca    8.4      06 May 2024 04:20  Mg     2.4     05-06    TPro  5.8<L>  /  Alb  2.7<L>  /  TBili  <0.2  /  DBili  x   /  AST  14  /  ALT  25  /  AlkPhos  83  05-06      Urinalysis Basic - ( 06 May 2024 04:20 )    Color: x / Appearance: x / SG: x / pH: x  Gluc: 192 mg/dL / Ketone: x  / Bili: x / Urobili: x   Blood: x / Protein: x / Nitrite: x   Leuk Esterase: x / RBC: x / WBC x   Sq Epi: x / Non Sq Epi: x / Bacteria: x        Lactate, Blood: 3.0 mmol/L *H* (05-06-24 @ 04:20)      Culture - Blood (collected 04 May 2024 07:07)  Source: .Blood None  Preliminary Report (05 May 2024 18:01):    No growth at 24 hours    Culture - Blood (collected 03 May 2024 18:00)  Source: .Blood Blood  Preliminary Report (05 May 2024 23:01):    No growth at 48 Hours          RADIOLOGY:    RADIOLOGY

## 2024-05-06 NOTE — PROGRESS NOTE ADULT - ASSESSMENT
57yo F w/ pmhx of Down's syndrome, cerebral palsy, seizure disorder presents from [facility] for respiratory distress. Admitted to SDU for severe sepsis and acute hypoxemic respiratory failure likely secondary to recurrent CAP.     # Acute hypoxemic respiratory failure   #Severe sepsis with septic shock poa  #possible recurrent pneumonia /suspected aspiration pna ( despite having peg )   #chronic hypotension   - CXR: possible basilar opacities  - covid/RSV, MSRA, negative; Bl cx with contaminant   - C/w Midodrine 5mg  - target MAP 60 ( Patient always has BP on the softer side )   - blood cx 4/15: staph capitis  - blood cx 4/15 and 4/17: NGTD  - Scopolamine for secretions. aspiration precautions   - Palliative care following   - 4/22 and 4/23: spiked fever and WBC ;WBC downtrending (10 on 4/26)  - F/u bcx 4/23 -> no growth to date  - CT chest angio w/ contrast 4/24: bilateral opacities   - currently on 4L facemask  -  fungitell  >> nEG   -Blood cx >> NGTD 04/28  - 5/5-5/6:  overnight patient was tachy to 140s, hypotensive (around baseline), febrile to 38.5, given 1L LR bolus   - given 1x danna and vanco dose   - bcx pending from this am pre-abx dose   - and bcx pending off abx from yesterday 5/5/24  - Cxr suspicious for aspiration pna   - ID f/u regarding abx plan   - lactate 3.0   - f/u repeat at 11am, 500cc aditional bolus  - f/u bcx off abx from 5/5 and 5/6   - f/u procal   - ID follow up regarding abx plan      #Episodes of Vomiting: resolved as of 5/6/24  - On PEG feed   - Aspiration risk ; C Xray done   - Can restart the PEG feeding for now     # B/l feet ulcers  - No abscess/cellulitis  - colonized with ESBL strain  - Off loading to prevent pressure sores   - wound care following     #seizure disorder, cont meds   #Down syndrome  #Nonverbal bedbound  # PEG tube   - c/w home meds    #Progress Note Handoff    Pending :  ID followup, followup bc and labs   Family discussion: will need meeting with group home admin prior to return   Disposition: group home   code status: dnr, dni . poor prognosis

## 2024-05-07 LAB
ALBUMIN SERPL ELPH-MCNC: 2.8 G/DL — LOW (ref 3.5–5.2)
ALP SERPL-CCNC: 87 U/L — SIGNIFICANT CHANGE UP (ref 30–115)
ALT FLD-CCNC: 23 U/L — SIGNIFICANT CHANGE UP (ref 0–41)
ANION GAP SERPL CALC-SCNC: 11 MMOL/L — SIGNIFICANT CHANGE UP (ref 7–14)
APPEARANCE UR: ABNORMAL
APTT BLD: 29.8 SEC — SIGNIFICANT CHANGE UP (ref 27–39.2)
AST SERPL-CCNC: 18 U/L — SIGNIFICANT CHANGE UP (ref 0–41)
BACTERIA # UR AUTO: NEGATIVE /HPF — SIGNIFICANT CHANGE UP
BASOPHILS # BLD AUTO: 0.05 K/UL — SIGNIFICANT CHANGE UP (ref 0–0.2)
BASOPHILS NFR BLD AUTO: 0.2 % — SIGNIFICANT CHANGE UP (ref 0–1)
BILIRUB SERPL-MCNC: <0.2 MG/DL — SIGNIFICANT CHANGE UP (ref 0.2–1.2)
BILIRUB UR-MCNC: NEGATIVE — SIGNIFICANT CHANGE UP
BUN SERPL-MCNC: 13 MG/DL — SIGNIFICANT CHANGE UP (ref 10–20)
CALCIUM SERPL-MCNC: 8.7 MG/DL — SIGNIFICANT CHANGE UP (ref 8.4–10.5)
CAST: 5 /LPF — HIGH (ref 0–4)
CHLORIDE SERPL-SCNC: 99 MMOL/L — SIGNIFICANT CHANGE UP (ref 98–110)
CO2 SERPL-SCNC: 26 MMOL/L — SIGNIFICANT CHANGE UP (ref 17–32)
COLOR SPEC: YELLOW — SIGNIFICANT CHANGE UP
CREAT SERPL-MCNC: <0.5 MG/DL — LOW (ref 0.7–1.5)
DIFF PNL FLD: NEGATIVE — SIGNIFICANT CHANGE UP
EGFR: 115 ML/MIN/1.73M2 — SIGNIFICANT CHANGE UP
EOSINOPHIL # BLD AUTO: 0.09 K/UL — SIGNIFICANT CHANGE UP (ref 0–0.7)
EOSINOPHIL NFR BLD AUTO: 0.4 % — SIGNIFICANT CHANGE UP (ref 0–8)
GLUCOSE BLDC GLUCOMTR-MCNC: 124 MG/DL — HIGH (ref 70–99)
GLUCOSE BLDC GLUCOMTR-MCNC: 141 MG/DL — HIGH (ref 70–99)
GLUCOSE BLDC GLUCOMTR-MCNC: 150 MG/DL — HIGH (ref 70–99)
GLUCOSE SERPL-MCNC: 122 MG/DL — HIGH (ref 70–99)
GLUCOSE UR QL: NEGATIVE MG/DL — SIGNIFICANT CHANGE UP
HCT VFR BLD CALC: 28.9 % — LOW (ref 37–47)
HGB BLD-MCNC: 8.7 G/DL — LOW (ref 12–16)
IMM GRANULOCYTES NFR BLD AUTO: 0.7 % — HIGH (ref 0.1–0.3)
INR BLD: 1.23 RATIO — SIGNIFICANT CHANGE UP (ref 0.65–1.3)
KETONES UR-MCNC: NEGATIVE MG/DL — SIGNIFICANT CHANGE UP
LACTATE SERPL-SCNC: 1.5 MMOL/L — SIGNIFICANT CHANGE UP (ref 0.7–2)
LEUKOCYTE ESTERASE UR-ACNC: NEGATIVE — SIGNIFICANT CHANGE UP
LYMPHOCYTES # BLD AUTO: 1.43 K/UL — SIGNIFICANT CHANGE UP (ref 1.2–3.4)
LYMPHOCYTES # BLD AUTO: 6.3 % — LOW (ref 20.5–51.1)
MAGNESIUM SERPL-MCNC: 2.7 MG/DL — HIGH (ref 1.8–2.4)
MCHC RBC-ENTMCNC: 28.9 PG — SIGNIFICANT CHANGE UP (ref 27–31)
MCHC RBC-ENTMCNC: 30.1 G/DL — LOW (ref 32–37)
MCV RBC AUTO: 96 FL — SIGNIFICANT CHANGE UP (ref 81–99)
MONOCYTES # BLD AUTO: 0.56 K/UL — SIGNIFICANT CHANGE UP (ref 0.1–0.6)
MONOCYTES NFR BLD AUTO: 2.5 % — SIGNIFICANT CHANGE UP (ref 1.7–9.3)
NEUTROPHILS # BLD AUTO: 20.28 K/UL — HIGH (ref 1.4–6.5)
NEUTROPHILS NFR BLD AUTO: 89.9 % — HIGH (ref 42.2–75.2)
NITRITE UR-MCNC: NEGATIVE — SIGNIFICANT CHANGE UP
NRBC # BLD: 0 /100 WBCS — SIGNIFICANT CHANGE UP (ref 0–0)
PH UR: 7.5 — SIGNIFICANT CHANGE UP (ref 5–8)
PLATELET # BLD AUTO: 571 K/UL — HIGH (ref 130–400)
PMV BLD: 10.2 FL — SIGNIFICANT CHANGE UP (ref 7.4–10.4)
POTASSIUM SERPL-MCNC: 4.3 MMOL/L — SIGNIFICANT CHANGE UP (ref 3.5–5)
POTASSIUM SERPL-SCNC: 4.3 MMOL/L — SIGNIFICANT CHANGE UP (ref 3.5–5)
PROCALCITONIN SERPL-MCNC: 0.59 NG/ML — HIGH (ref 0.02–0.1)
PROT SERPL-MCNC: 6 G/DL — SIGNIFICANT CHANGE UP (ref 6–8)
PROT UR-MCNC: 100 MG/DL
PROTHROM AB SERPL-ACNC: 14 SEC — HIGH (ref 9.95–12.87)
RBC # BLD: 3.01 M/UL — LOW (ref 4.2–5.4)
RBC # FLD: 22.8 % — HIGH (ref 11.5–14.5)
RBC CASTS # UR COMP ASSIST: 0 /HPF — SIGNIFICANT CHANGE UP (ref 0–4)
SODIUM SERPL-SCNC: 136 MMOL/L — SIGNIFICANT CHANGE UP (ref 135–146)
SP GR SPEC: >1.03 — HIGH (ref 1–1.03)
SQUAMOUS # UR AUTO: 1 /HPF — SIGNIFICANT CHANGE UP (ref 0–5)
UROBILINOGEN FLD QL: 0.2 MG/DL — SIGNIFICANT CHANGE UP (ref 0.2–1)
WBC # BLD: 22.57 K/UL — HIGH (ref 4.8–10.8)
WBC # FLD AUTO: 22.57 K/UL — HIGH (ref 4.8–10.8)
WBC UR QL: 1 /HPF — SIGNIFICANT CHANGE UP (ref 0–5)

## 2024-05-07 PROCEDURE — 99232 SBSQ HOSP IP/OBS MODERATE 35: CPT

## 2024-05-07 RX ORDER — SODIUM CHLORIDE 9 MG/ML
1000 INJECTION, SOLUTION INTRAVENOUS ONCE
Refills: 0 | Status: DISCONTINUED | OUTPATIENT
Start: 2024-05-07 | End: 2024-05-07

## 2024-05-07 RX ORDER — ACETAMINOPHEN 500 MG
325 TABLET ORAL ONCE
Refills: 0 | Status: COMPLETED | OUTPATIENT
Start: 2024-05-07 | End: 2024-05-07

## 2024-05-07 RX ORDER — SODIUM CHLORIDE 9 MG/ML
500 INJECTION, SOLUTION INTRAVENOUS ONCE
Refills: 0 | Status: COMPLETED | OUTPATIENT
Start: 2024-05-07 | End: 2024-05-07

## 2024-05-07 RX ADMIN — Medication 400 MILLIGRAM(S): at 21:47

## 2024-05-07 RX ADMIN — ENOXAPARIN SODIUM 30 MILLIGRAM(S): 100 INJECTION SUBCUTANEOUS at 13:15

## 2024-05-07 RX ADMIN — MEROPENEM 100 MILLIGRAM(S): 1 INJECTION INTRAVENOUS at 15:50

## 2024-05-07 RX ADMIN — SCOPALAMINE 1 PATCH: 1 PATCH, EXTENDED RELEASE TRANSDERMAL at 19:07

## 2024-05-07 RX ADMIN — SODIUM CHLORIDE 1000 MILLILITER(S): 9 INJECTION, SOLUTION INTRAVENOUS at 06:44

## 2024-05-07 RX ADMIN — LEVETIRACETAM 750 MILLIGRAM(S): 250 TABLET, FILM COATED ORAL at 17:49

## 2024-05-07 RX ADMIN — Medication 650 MILLIGRAM(S): at 06:29

## 2024-05-07 RX ADMIN — Medication 1 DROP(S): at 17:43

## 2024-05-07 RX ADMIN — GABAPENTIN 300 MILLIGRAM(S): 400 CAPSULE ORAL at 17:47

## 2024-05-07 RX ADMIN — FAMOTIDINE 20 MILLIGRAM(S): 10 INJECTION INTRAVENOUS at 05:56

## 2024-05-07 RX ADMIN — MIDODRINE HYDROCHLORIDE 5 MILLIGRAM(S): 2.5 TABLET ORAL at 13:16

## 2024-05-07 RX ADMIN — GABAPENTIN 300 MILLIGRAM(S): 400 CAPSULE ORAL at 05:57

## 2024-05-07 RX ADMIN — Medication 3 MILLILITER(S): at 05:49

## 2024-05-07 RX ADMIN — Medication 1 APPLICATION(S): at 17:48

## 2024-05-07 RX ADMIN — Medication 3 MILLILITER(S): at 09:45

## 2024-05-07 RX ADMIN — Medication 650 MILLIGRAM(S): at 17:51

## 2024-05-07 RX ADMIN — Medication 1 TABLET(S): at 11:13

## 2024-05-07 RX ADMIN — RALOXIFENE HYDROCHLORIDE 60 MILLIGRAM(S): 60 TABLET, COATED ORAL at 11:11

## 2024-05-07 RX ADMIN — Medication 1 APPLICATION(S): at 17:43

## 2024-05-07 RX ADMIN — Medication 400 MILLIGRAM(S): at 23:00

## 2024-05-07 RX ADMIN — Medication 1 APPLICATION(S): at 05:57

## 2024-05-07 RX ADMIN — SENNA PLUS 2 TABLET(S): 8.6 TABLET ORAL at 21:33

## 2024-05-07 RX ADMIN — Medication 650 MILLIGRAM(S): at 19:00

## 2024-05-07 RX ADMIN — SCOPALAMINE 1 PATCH: 1 PATCH, EXTENDED RELEASE TRANSDERMAL at 07:00

## 2024-05-07 RX ADMIN — Medication 1 APPLICATION(S): at 05:56

## 2024-05-07 RX ADMIN — CHLORHEXIDINE GLUCONATE 1 APPLICATION(S): 213 SOLUTION TOPICAL at 11:14

## 2024-05-07 RX ADMIN — Medication 3 MILLILITER(S): at 19:21

## 2024-05-07 RX ADMIN — MEROPENEM 100 MILLIGRAM(S): 1 INJECTION INTRAVENOUS at 05:55

## 2024-05-07 RX ADMIN — FAMOTIDINE 20 MILLIGRAM(S): 10 INJECTION INTRAVENOUS at 17:49

## 2024-05-07 RX ADMIN — Medication 1 APPLICATION(S): at 11:14

## 2024-05-07 RX ADMIN — MIDODRINE HYDROCHLORIDE 5 MILLIGRAM(S): 2.5 TABLET ORAL at 05:56

## 2024-05-07 RX ADMIN — Medication 325 MILLIGRAM(S): at 06:43

## 2024-05-07 RX ADMIN — MIDODRINE HYDROCHLORIDE 5 MILLIGRAM(S): 2.5 TABLET ORAL at 21:32

## 2024-05-07 RX ADMIN — Medication 1 DROP(S): at 05:56

## 2024-05-07 RX ADMIN — Medication 650 MILLIGRAM(S): at 05:59

## 2024-05-07 RX ADMIN — LEVETIRACETAM 750 MILLIGRAM(S): 250 TABLET, FILM COATED ORAL at 05:55

## 2024-05-07 RX ADMIN — MEROPENEM 100 MILLIGRAM(S): 1 INJECTION INTRAVENOUS at 21:32

## 2024-05-07 RX ADMIN — Medication 3 MILLILITER(S): at 13:47

## 2024-05-07 NOTE — PROGRESS NOTE ADULT - SUBJECTIVE AND OBJECTIVE BOX
pt seen and examined.     My notes supersede resident's notes in case of discrepancy       ROS: no cp, no sob, no n/v, no fever    Vital Signs Last 24 Hrs  T(C): 37.8 (07 May 2024 11:25), Max: 39 (07 May 2024 05:01)  T(F): 100.1 (07 May 2024 11:25), Max: 102.2 (07 May 2024 05:01)  HR: 122 (07 May 2024 12:22) (116 - 137)  BP: 97/62 (07 May 2024 12:22) (81/52 - 97/62)  BP(mean): --  RR: 18 (07 May 2024 12:22) (17 - 18)  SpO2: 89% (07 May 2024 12:22) (89% - 95%)    Parameters below as of 07 May 2024 08:17  Patient On (Oxygen Delivery Method): nasal cannula  O2 Flow (L/min): 4      physical exam  constitutional NAD, awake, non verbal, contracted ext , Respiratory  lungs CTA, CVS heart RRR, GI: abdomen Soft NT, ND, BS+, peg in place, site is cleaned   neuro exam cannot participate     MEDICATIONS  (STANDING):  albuterol/ipratropium for Nebulization 3 milliLiter(s) Nebulizer every 6 hours  AQUAPHOR (petrolatum Ointment) 1 Application(s) Topical two times a day  artificial  tears Solution 1 Drop(s) Both EYES two times a day  calcium carbonate   1250 mG (OsCal) 1 Tablet(s) Oral daily  chlorhexidine 2% Cloths 1 Application(s) Topical daily  enoxaparin Injectable 30 milliGRAM(s) SubCutaneous every 24 hours  famotidine    Tablet 20 milliGRAM(s) Oral two times a day  gabapentin Solution 300 milliGRAM(s) Oral two times a day  hydrocortisone 1% Cream 1 Application(s) Topical daily  levETIRAcetam  Solution 750 milliGRAM(s) Oral two times a day  meropenem  IVPB 1000 milliGRAM(s) IV Intermittent every 8 hours  midodrine 5 milliGRAM(s) Oral every 8 hours  raloxifene 60 milliGRAM(s) Oral daily  scopolamine 1 mG/72 Hr(s) Patch 1 Patch Transdermal every 72 hours  senna 2 Tablet(s) Oral at bedtime  silver sulfADIAZINE 1% Cream 1 Application(s) Topical two times a day    MEDICATIONS  (PRN):  acetaminophen     Tablet .. 650 milliGRAM(s) Oral every 6 hours PRN Temp greater or equal to 38C (100.4F), Mild Pain (1 - 3)  aluminum hydroxide/magnesium hydroxide/simethicone Suspension 30 milliLiter(s) Oral every 4 hours PRN Dyspepsia  ibuprofen  Tablet. 400 milliGRAM(s) Oral every 6 hours PRN Temp greater or equal to 38C (100.4F)  melatonin 3 milliGRAM(s) Oral at bedtime PRN Insomnia  ondansetron Injectable 4 milliGRAM(s) IV Push every 8 hours PRN Nausea and/or Vomiting                          8.7    22.57 )-----------( 571      ( 07 May 2024 05:39 )             28.9     05-07    136  |  99  |  13  ----------------------------<  122<H>  4.3   |  26  |  <0.5<L>    Ca    8.7      07 May 2024 05:39  Mg     2.7     05-07    TPro  6.0  /  Alb  2.8<L>  /  TBili  <0.2  /  DBili  x   /  AST  18  /  ALT  23  /  AlkPhos  87  05-07    Procalcitonin: 0.59 ng/mL [0.02 - 0.10] (05-06-24 @ 10:50)  Procalcitonin: 0.16 ng/mL [0.02 - 0.10] (05-03-24 @ 18:01)  Procalcitonin, Serum: 6.93 ng/mL [0.02 - 0.10] (04-16-24 @ 07:03)  D-Dimer Assay, Quantitative: 397 ng/mL DDU (03-13-24 @ 07:08)  Procalcitonin, Serum: 0.19 ng/mL [0.02 - 0.10] (03-13-24 @ 07:08)    PT/INR - ( 07 May 2024 05:39 )   PT: 14.00 sec;   INR: 1.23 ratio     PTT - ( 07 May 2024 05:39 )  PTT:29.8 sec    Culture - Blood (collected 05 May 2024 17:54)  Source: .Blood Blood  Preliminary Report (07 May 2024 01:02):    No growth at 24 hours    < from: CT Chest w/ IV Cont (05.06.24 @ 23:32) >   Patulous distended fluid-filled esophagus associated with 7.4 cm hiatal   hernia (at risk for aspiration)..    Bilateral areas of consolidation both lower lobes.  Patchy opacity both lungs, right greater than left, similar to earlier   study. Interval resolution of previous small effusions.    Markedly distended urinary bladder with dependent debris, possible   hemorrhage. No bladder calculi. (4/128). Correlate with urinalysis.    < end of copied text >    a/p    57yo F w/ pmhx of Down's syndrome, cerebral palsy, seizure disorder presents from [facility] for respiratory distress. Admitted to SDU for severe sepsis and acute hypoxemic respiratory failure likely secondary to recurrent CAP.     # sepsis , recurrent, most likely due to recurrent aspiration pneumonia   cont danna  fu cultures   decrease feeding volume in view of large hiatal hernia     # distended bladder, straight cath , ua, uc   bladder scan q6    # B/l feet ulcers  - No abscess/cellulitis  - colonized with ESBL strain  - Off loading to prevent pressure sores   - wound care following     #seizure disorder, cont meds   #Down syndrome  #Nonverbal bedbound  # PEG tube   - c/w home meds    #Progress Note Handoff    Pending : id followup   Family discussion: will need meeting with group home admin prior to return   Disposition: acute at this time , when stable plan is to return to group home   code status: dnr, dni . poor prognosis

## 2024-05-07 NOTE — PROGRESS NOTE ADULT - SUBJECTIVE AND OBJECTIVE BOX
24H events:    Patient is a 58y old Female who presents with a chief complaint of Pneumonia (06 May 2024 11:35)    Primary diagnosis of Acute sepsis    Today is hospital day 22d.     Lactate improved to 1.5 overnight   still tachy febrile   CT showing signs of asp pna in setting of hiatal hernia   on meropenem   500cc bolus ordered this am   tylenol administered for fever     Code Status:    Family communication:  Contact date:  Name of person contacted:  Relationship to patient:  Communication details:  What matters most:    PAST MEDICAL & SURGICAL HISTORY  Down syndrome    Osteoporosis    Mild anemia    Neuropathy    S/P debridement  of R hip on 3/2/21      SOCIAL HISTORY:  Social History:      ALLERGIES:  No Known Allergies    MEDICATIONS:  STANDING MEDICATIONS  albuterol/ipratropium for Nebulization 3 milliLiter(s) Nebulizer every 6 hours  AQUAPHOR (petrolatum Ointment) 1 Application(s) Topical two times a day  artificial  tears Solution 1 Drop(s) Both EYES two times a day  calcium carbonate   1250 mG (OsCal) 1 Tablet(s) Oral daily  chlorhexidine 2% Cloths 1 Application(s) Topical daily  enoxaparin Injectable 30 milliGRAM(s) SubCutaneous every 24 hours  famotidine    Tablet 20 milliGRAM(s) Oral two times a day  gabapentin Solution 300 milliGRAM(s) Oral two times a day  hydrocortisone 1% Cream 1 Application(s) Topical daily  levETIRAcetam  Solution 750 milliGRAM(s) Oral two times a day  meropenem  IVPB 1000 milliGRAM(s) IV Intermittent every 8 hours  midodrine 5 milliGRAM(s) Oral every 8 hours  raloxifene 60 milliGRAM(s) Oral daily  scopolamine 1 mG/72 Hr(s) Patch 1 Patch Transdermal every 72 hours  senna 2 Tablet(s) Oral at bedtime  silver sulfADIAZINE 1% Cream 1 Application(s) Topical two times a day    PRN MEDICATIONS  acetaminophen     Tablet .. 650 milliGRAM(s) Oral every 6 hours PRN  aluminum hydroxide/magnesium hydroxide/simethicone Suspension 30 milliLiter(s) Oral every 4 hours PRN  ibuprofen  Tablet. 400 milliGRAM(s) Oral every 6 hours PRN  melatonin 3 milliGRAM(s) Oral at bedtime PRN  ondansetron Injectable 4 milliGRAM(s) IV Push every 8 hours PRN    VITALS:   T(F): 102.2  HR: 137  BP: 93/61  RR: 17  SpO2: 91%    PHYSICAL EXAM:    GENERAL: ill apprearing, non verbal  HEAD: NCAD, no hematoma or laceration   NECK: Supple, no jvd  HEART: tachy, regular rhythm , no murmur   LUNGS: decrease bs at right base, coarse rhonchi upper airway  ABDOMEN:  soft, non-tender, peg-tube present  EXTREMITIES: b/l UE and LE contracted   NERVOUS SYSTEM: unable to follow commands       (  ) Indwelling Madsen Catheter:   Date insterted:    Reason (  ) Critical illness     (  ) urinary retention    (  ) Accurate Ins/Outs Monitoring     (  ) CMO patient    (  ) Central Line:   Date inserted:  Location: (  ) Right IJ     (  ) Left IJ     (  ) Right Fem     (  ) Left Fem    (  ) SPC        (  ) pigtail       (  ) PEG tube       (  ) colostomy       (  ) jejunostomy  (  ) U-Dall    LABS:                        8.7    22.57 )-----------( 571      ( 07 May 2024 05:39 )             28.9     05-06    136  |  99  |  27<H>  ----------------------------<  192<H>  4.7   |  25  |  0.8    Ca    8.4      06 May 2024 04:20  Mg     2.4     05-06    TPro  5.8<L>  /  Alb  2.7<L>  /  TBili  <0.2  /  DBili  x   /  AST  14  /  ALT  25  /  AlkPhos  83  05-06    PT/INR - ( 07 May 2024 05:39 )   PT: 14.00 sec;   INR: 1.23 ratio         PTT - ( 07 May 2024 05:39 )  PTT:29.8 sec  Urinalysis Basic - ( 06 May 2024 04:20 )    Color: x / Appearance: x / SG: x / pH: x  Gluc: 192 mg/dL / Ketone: x  / Bili: x / Urobili: x   Blood: x / Protein: x / Nitrite: x   Leuk Esterase: x / RBC: x / WBC x   Sq Epi: x / Non Sq Epi: x / Bacteria: x        Lactate, Blood: 1.5 mmol/L (05-06-24 @ 21:54)  Lactate, Blood: 2.7 mmol/L *H* (05-06-24 @ 12:39)      Culture - Blood (collected 05 May 2024 17:54)  Source: .Blood Blood  Preliminary Report (07 May 2024 01:02):    No growth at 24 hours          RADIOLOGY:    RADIOLOGY

## 2024-05-07 NOTE — PROGRESS NOTE ADULT - ASSESSMENT
57yo F w/ pmhx of Down's syndrome, cerebral palsy, seizure disorder presents from [facility] for respiratory distress. Admitted to SDU for severe sepsis and acute hypoxemic respiratory failure likely secondary to recurrent CAP.     # Acute hypoxemic respiratory failure   #Severe sepsis with septic shock poa  #possible recurrent pneumonia /suspected aspiration pna ( despite having peg )   #chronic hypotension   - CXR: possible basilar opacities  - covid/RSV, MSRA, negative; Bl cx with contaminant   - C/w Midodrine 5mg  - target MAP 60 ( Patient always has BP on the softer side )   - blood cx 4/15: staph capitis  - blood cx 4/15 and 4/17: NGTD  - Scopolamine for secretions. aspiration precautions   - Palliative care following   - 4/22 and 4/23: spiked fever and WBC ;WBC downtrending (10 on 4/26)  - F/u bcx 4/23 -> no growth to date  - CT chest angio w/ contrast 4/24: bilateral opacities   - currently on 4L facemask  -  fungitell  >> nEG   -Blood cx >> NGTD 04/28  - 5/5-5/6:  overnight patient was tachy to 140s, hypotensive (around baseline), febrile to 38.5, given 1L LR bolus and then additional 500cc for elevated lactate of 3  - lactate improved overnight, tachy and febrile this am on 5/7/24, given 500cc bolus, CT showing asp pna,  - given 1x danna and vanco dose, will continue danna 1g q8  - bcx pending from this am pre-abx dose   - and bcx pending off abx from yesterday 5/5/24  - lactate improved to 1.5 overnight       #Episodes of Vomiting: resolved as of 5/6/24  - On PEG feed   - Aspiration risk ; C Xray done   - Can restart the PEG feeding for now     # B/l feet ulcers  - No abscess/cellulitis  - colonized with ESBL strain  - Off loading to prevent pressure sores   - wound care following     #seizure disorder, cont meds   #Down syndrome  #Nonverbal bedbound  # PEG tube   - c/w home meds    #Progress Note Handoff    Pending :  ID followup, followup bc and labs   Family discussion: will need meeting with group home admin prior to return   Disposition: group home   code status: dnr, dni . poor prognosis   59yo F w/ pmhx of Down's syndrome, cerebral palsy, seizure disorder presents from [facility] for respiratory distress. Admitted to SDU for severe sepsis and acute hypoxemic respiratory failure likely secondary to recurrent CAP.     # Acute hypoxemic respiratory failure   #Severe sepsis with septic shock poa  #possible recurrent pneumonia /suspected aspiration pna ( despite having peg )   #chronic hypotension   - CXR: possible basilar opacities  - covid/RSV, MSRA, negative; Bl cx with contaminant   - C/w Midodrine 5mg  - target MAP 60 ( Patient always has BP on the softer side )   - blood cx 4/15: staph capitis  - blood cx 4/15 and 4/17: NGTD  - Scopolamine for secretions. aspiration precautions   - Palliative care following   - 4/22 and 4/23: spiked fever and WBC ;WBC downtrending (10 on 4/26)  - F/u bcx 4/23 -> no growth to date  - CT chest angio w/ contrast 4/24: bilateral opacities   - currently on 4L facemask  -  fungitell  >> nEG   -Blood cx >> NGTD 04/28  - 5/5-5/6:  overnight patient was tachy to 140s, hypotensive (around baseline), febrile to 38.5, given 1L LR bolus and then additional 500cc for elevated lactate of 3  - lactate improved overnight, tachy and febrile this am on 5/7/24, given 500cc bolus, CT showing asp pna,  - given 1x danna and vanco dose, will continue danna 1g q8  - bcx pending from this am pre-abx dose   - and bcx pending off abx from yesterday 5/5/24  - lactate improved to 1.5 overnight   - will reduce volume of diet bolus to 250 cc due to suspected gastric distention contributing to aspiration       #Episodes of Vomiting: resolved as of 5/6/24  - On PEG feed   - Aspiration risk ; C Xray done   - Can restart the PEG feeding for now     # B/l feet ulcers  - No abscess/cellulitis  - colonized with ESBL strain  - Off loading to prevent pressure sores   - wound care following     #seizure disorder, cont meds   #Down syndrome  #Nonverbal bedbound  # PEG tube   - c/w home meds    #Progress Note Handoff    Pending :  ID followup, followup bc and labs   Family discussion: will need meeting with group home admin prior to return   Disposition: group home   code status: dnr, dni . poor prognosis

## 2024-05-08 LAB
ANION GAP SERPL CALC-SCNC: 13 MMOL/L — SIGNIFICANT CHANGE UP (ref 7–14)
BASOPHILS # BLD AUTO: 0.06 K/UL — SIGNIFICANT CHANGE UP (ref 0–0.2)
BASOPHILS NFR BLD AUTO: 0.4 % — SIGNIFICANT CHANGE UP (ref 0–1)
BUN SERPL-MCNC: 17 MG/DL — SIGNIFICANT CHANGE UP (ref 10–20)
CALCIUM SERPL-MCNC: 8.9 MG/DL — SIGNIFICANT CHANGE UP (ref 8.4–10.5)
CHLORIDE SERPL-SCNC: 100 MMOL/L — SIGNIFICANT CHANGE UP (ref 98–110)
CO2 SERPL-SCNC: 26 MMOL/L — SIGNIFICANT CHANGE UP (ref 17–32)
CREAT SERPL-MCNC: 0.5 MG/DL — LOW (ref 0.7–1.5)
CULTURE RESULTS: SIGNIFICANT CHANGE UP
EGFR: 109 ML/MIN/1.73M2 — SIGNIFICANT CHANGE UP
EOSINOPHIL # BLD AUTO: 0.26 K/UL — SIGNIFICANT CHANGE UP (ref 0–0.7)
EOSINOPHIL NFR BLD AUTO: 1.5 % — SIGNIFICANT CHANGE UP (ref 0–8)
GLUCOSE BLDC GLUCOMTR-MCNC: 104 MG/DL — HIGH (ref 70–99)
GLUCOSE BLDC GLUCOMTR-MCNC: 117 MG/DL — HIGH (ref 70–99)
GLUCOSE BLDC GLUCOMTR-MCNC: 119 MG/DL — HIGH (ref 70–99)
GLUCOSE BLDC GLUCOMTR-MCNC: 135 MG/DL — HIGH (ref 70–99)
GLUCOSE SERPL-MCNC: 125 MG/DL — HIGH (ref 70–99)
HCT VFR BLD CALC: 27.2 % — LOW (ref 37–47)
HGB BLD-MCNC: 8.3 G/DL — LOW (ref 12–16)
IMM GRANULOCYTES NFR BLD AUTO: 0.6 % — HIGH (ref 0.1–0.3)
LACTATE SERPL-SCNC: 2.1 MMOL/L — HIGH (ref 0.7–2)
LYMPHOCYTES # BLD AUTO: 0.85 K/UL — LOW (ref 1.2–3.4)
LYMPHOCYTES # BLD AUTO: 5.1 % — LOW (ref 20.5–51.1)
MAGNESIUM SERPL-MCNC: 2.1 MG/DL — SIGNIFICANT CHANGE UP (ref 1.8–2.4)
MCHC RBC-ENTMCNC: 29.1 PG — SIGNIFICANT CHANGE UP (ref 27–31)
MCHC RBC-ENTMCNC: 30.5 G/DL — LOW (ref 32–37)
MCV RBC AUTO: 95.4 FL — SIGNIFICANT CHANGE UP (ref 81–99)
MONOCYTES # BLD AUTO: 0.57 K/UL — SIGNIFICANT CHANGE UP (ref 0.1–0.6)
MONOCYTES NFR BLD AUTO: 3.4 % — SIGNIFICANT CHANGE UP (ref 1.7–9.3)
NEUTROPHILS # BLD AUTO: 14.99 K/UL — HIGH (ref 1.4–6.5)
NEUTROPHILS NFR BLD AUTO: 89 % — HIGH (ref 42.2–75.2)
NRBC # BLD: 0 /100 WBCS — SIGNIFICANT CHANGE UP (ref 0–0)
PLATELET # BLD AUTO: 532 K/UL — HIGH (ref 130–400)
PMV BLD: 10.1 FL — SIGNIFICANT CHANGE UP (ref 7.4–10.4)
POTASSIUM SERPL-MCNC: 4.5 MMOL/L — SIGNIFICANT CHANGE UP (ref 3.5–5)
POTASSIUM SERPL-SCNC: 4.5 MMOL/L — SIGNIFICANT CHANGE UP (ref 3.5–5)
RBC # BLD: 2.85 M/UL — LOW (ref 4.2–5.4)
RBC # FLD: 22.2 % — HIGH (ref 11.5–14.5)
SODIUM SERPL-SCNC: 139 MMOL/L — SIGNIFICANT CHANGE UP (ref 135–146)
SPECIMEN SOURCE: SIGNIFICANT CHANGE UP
WBC # BLD: 16.83 K/UL — HIGH (ref 4.8–10.8)
WBC # FLD AUTO: 16.83 K/UL — HIGH (ref 4.8–10.8)

## 2024-05-08 PROCEDURE — 99232 SBSQ HOSP IP/OBS MODERATE 35: CPT

## 2024-05-08 RX ADMIN — MEROPENEM 100 MILLIGRAM(S): 1 INJECTION INTRAVENOUS at 14:39

## 2024-05-08 RX ADMIN — LEVETIRACETAM 750 MILLIGRAM(S): 250 TABLET, FILM COATED ORAL at 17:45

## 2024-05-08 RX ADMIN — Medication 1 APPLICATION(S): at 05:37

## 2024-05-08 RX ADMIN — Medication 3 MILLILITER(S): at 20:08

## 2024-05-08 RX ADMIN — Medication 400 MILLIGRAM(S): at 17:49

## 2024-05-08 RX ADMIN — Medication 1 DROP(S): at 05:33

## 2024-05-08 RX ADMIN — GABAPENTIN 300 MILLIGRAM(S): 400 CAPSULE ORAL at 06:09

## 2024-05-08 RX ADMIN — CHLORHEXIDINE GLUCONATE 1 APPLICATION(S): 213 SOLUTION TOPICAL at 11:22

## 2024-05-08 RX ADMIN — Medication 3 MILLILITER(S): at 13:26

## 2024-05-08 RX ADMIN — Medication 1 APPLICATION(S): at 17:47

## 2024-05-08 RX ADMIN — MIDODRINE HYDROCHLORIDE 5 MILLIGRAM(S): 2.5 TABLET ORAL at 05:37

## 2024-05-08 RX ADMIN — FAMOTIDINE 20 MILLIGRAM(S): 10 INJECTION INTRAVENOUS at 17:45

## 2024-05-08 RX ADMIN — FAMOTIDINE 20 MILLIGRAM(S): 10 INJECTION INTRAVENOUS at 05:36

## 2024-05-08 RX ADMIN — MEROPENEM 100 MILLIGRAM(S): 1 INJECTION INTRAVENOUS at 21:33

## 2024-05-08 RX ADMIN — Medication 650 MILLIGRAM(S): at 13:49

## 2024-05-08 RX ADMIN — Medication 1 TABLET(S): at 11:21

## 2024-05-08 RX ADMIN — Medication 650 MILLIGRAM(S): at 15:00

## 2024-05-08 RX ADMIN — GABAPENTIN 300 MILLIGRAM(S): 400 CAPSULE ORAL at 17:48

## 2024-05-08 RX ADMIN — SENNA PLUS 2 TABLET(S): 8.6 TABLET ORAL at 21:34

## 2024-05-08 RX ADMIN — Medication 1 APPLICATION(S): at 17:46

## 2024-05-08 RX ADMIN — SCOPALAMINE 1 PATCH: 1 PATCH, EXTENDED RELEASE TRANSDERMAL at 21:32

## 2024-05-08 RX ADMIN — Medication 1 APPLICATION(S): at 05:34

## 2024-05-08 RX ADMIN — ENOXAPARIN SODIUM 30 MILLIGRAM(S): 100 INJECTION SUBCUTANEOUS at 13:46

## 2024-05-08 RX ADMIN — MIDODRINE HYDROCHLORIDE 5 MILLIGRAM(S): 2.5 TABLET ORAL at 13:47

## 2024-05-08 RX ADMIN — MEROPENEM 100 MILLIGRAM(S): 1 INJECTION INTRAVENOUS at 05:34

## 2024-05-08 RX ADMIN — LEVETIRACETAM 750 MILLIGRAM(S): 250 TABLET, FILM COATED ORAL at 05:36

## 2024-05-08 RX ADMIN — Medication 1 DROP(S): at 17:46

## 2024-05-08 RX ADMIN — RALOXIFENE HYDROCHLORIDE 60 MILLIGRAM(S): 60 TABLET, COATED ORAL at 11:22

## 2024-05-08 RX ADMIN — SCOPALAMINE 1 PATCH: 1 PATCH, EXTENDED RELEASE TRANSDERMAL at 04:50

## 2024-05-08 RX ADMIN — Medication 3 MILLILITER(S): at 08:43

## 2024-05-08 RX ADMIN — MIDODRINE HYDROCHLORIDE 5 MILLIGRAM(S): 2.5 TABLET ORAL at 21:33

## 2024-05-08 RX ADMIN — Medication 1 APPLICATION(S): at 11:22

## 2024-05-08 NOTE — PROGRESS NOTE ADULT - SUBJECTIVE AND OBJECTIVE BOX
TEA ECHAVARRIA  58y  Female      Patient is a 58y old  Female who presents with a chief complaint of Pneumonia.       INTERVAL HPI/OVERNIGHT EVENTS:      ******************************* REVIEW OF SYSTEMS:**********************************************    All other review of systems negative    *********************** VITALS ******************************************    T(F): 101.1 (05-08-24 @ 13:29)  HR: 106 (05-08-24 @ 05:03) (106 - 106)  BP: 95/59 (05-08-24 @ 05:03) (95/59 - 95/59)  RR: 20 (05-08-24 @ 05:03) (20 - 20)  SpO2: 96% (05-08-24 @ 05:03) (96% - 96%)    05-07-24 @ 07:01  -  05-08-24 @ 07:00  --------------------------------------------------------  IN: 350 mL / OUT: 800 mL / NET: -450 mL    05-08-24 @ 07:01  -  05-08-24 @ 14:07  --------------------------------------------------------  IN: 350 mL / OUT: 0 mL / NET: 350 mL            05-07-24 @ 07:01  -  05-08-24 @ 07:00  --------------------------------------------------------  IN: 350 mL / OUT: 800 mL / NET: -450 mL    05-08-24 @ 07:01  -  05-08-24 @ 14:07  --------------------------------------------------------  IN: 350 mL / OUT: 0 mL / NET: 350 mL        ******************************** PHYSICAL EXAM:**************************************************  GENERAL: NAD    PSYCH: no agitation, baseline mentation  HEENT:     NERVOUS SYSTEM:  Alert & awake x2,     PULMONARY: MARIE, CTA    CARDIOVASCULAR: S1S2 RRR    GI: Soft, NT, ND; BS present.    EXTREMITIES:  2+ Peripheral Pulses, No clubbing, cyanosis, or edema    LYMPH: No lymphadenopathy noted    SKIN: No rashes or lesions      **************************** LABS *******************************************************                          8.3    16.83 )-----------( 532      ( 08 May 2024 07:24 )             27.2     05-08    139  |  100  |  17  ----------------------------<  125<H>  4.5   |  26  |  0.5<L>    Ca    8.9      08 May 2024 07:24  Mg     2.1     05-08    TPro  6.0  /  Alb  2.8<L>  /  TBili  <0.2  /  DBili  x   /  AST  18  /  ALT  23  /  AlkPhos  87  05-07      Urinalysis Basic - ( 08 May 2024 07:24 )    Color: x / Appearance: x / SG: x / pH: x  Gluc: 125 mg/dL / Ketone: x  / Bili: x / Urobili: x   Blood: x / Protein: x / Nitrite: x   Leuk Esterase: x / RBC: x / WBC x   Sq Epi: x / Non Sq Epi: x / Bacteria: x      PT/INR - ( 07 May 2024 05:39 )   PT: 14.00 sec;   INR: 1.23 ratio         PTT - ( 07 May 2024 05:39 )  PTT:29.8 sec  Lactate Trend  05-08 @ 07:24 Lactate:2.1   05-07 @ 05:39 Lactate:1.5   05-06 @ 21:54 Lactate:1.5   05-06 @ 12:39 Lactate:2.7   05-06 @ 04:20 Lactate:3.0         CAPILLARY BLOOD GLUCOSE      POCT Blood Glucose.: 104 mg/dL (08 May 2024 11:23)          **************************Active Medications *******************************************  No Known Allergies      acetaminophen     Tablet .. 650 milliGRAM(s) Oral every 6 hours PRN  albuterol/ipratropium for Nebulization 3 milliLiter(s) Nebulizer every 6 hours  aluminum hydroxide/magnesium hydroxide/simethicone Suspension 30 milliLiter(s) Oral every 4 hours PRN  AQUAPHOR (petrolatum Ointment) 1 Application(s) Topical two times a day  artificial  tears Solution 1 Drop(s) Both EYES two times a day  calcium carbonate   1250 mG (OsCal) 1 Tablet(s) Oral daily  chlorhexidine 2% Cloths 1 Application(s) Topical daily  enoxaparin Injectable 30 milliGRAM(s) SubCutaneous every 24 hours  famotidine    Tablet 20 milliGRAM(s) Oral two times a day  gabapentin Solution 300 milliGRAM(s) Oral two times a day  hydrocortisone 1% Cream 1 Application(s) Topical daily  ibuprofen  Tablet. 400 milliGRAM(s) Oral every 6 hours PRN  levETIRAcetam  Solution 750 milliGRAM(s) Oral two times a day  melatonin 3 milliGRAM(s) Oral at bedtime PRN  meropenem  IVPB 1000 milliGRAM(s) IV Intermittent every 8 hours  midodrine 5 milliGRAM(s) Oral every 8 hours  ondansetron Injectable 4 milliGRAM(s) IV Push every 8 hours PRN  raloxifene 60 milliGRAM(s) Oral daily  scopolamine 1 mG/72 Hr(s) Patch 1 Patch Transdermal every 72 hours  senna 2 Tablet(s) Oral at bedtime  silver sulfADIAZINE 1% Cream 1 Application(s) Topical two times a day      ***************************************************  RADIOLOGY & ADDITIONAL TESTS:    Imaging Personally Reviewed:  [ ] YES  [ ] NO    HEALTH ISSUES - PROBLEM Dx:  Septic shock    Acute respiratory failure with hypoxia    Advanced care planning/counseling discussion    Encounter for palliative care

## 2024-05-08 NOTE — PROGRESS NOTE ADULT - ASSESSMENT
57yo F w/ pmhx of Down's syndrome, cerebral palsy, seizure disorder presents from [facility] for respiratory distress. Admitted to SDU for severe sepsis and acute hypoxemic respiratory failure likely secondary to recurrent CAP.     # Acute hypoxemic respiratory failure   #Severe sepsis with septic shock poa  #possible recurrent pneumonia /suspected aspiration pna ( despite having peg )   #chronic hypotension   - CXR: possible basilar opacities  - covid/RSV, MSRA, negative; Bl cx with contaminant   - C/w Midodrine 5mg  - target MAP 60 ( Patient always has BP on the softer side )   - blood cx 4/15: staph capitis  - blood cx 4/15 and 4/17: NGTD  - Scopolamine for secretions. aspiration precautions   - Palliative care following   - 4/22 and 4/23: spiked fever and WBC ;WBC downtrending (10 on 4/26)  - F/u bcx 4/23 -> no growth to date  - CT chest angio w/ contrast 4/24: bilateral opacities   - currently on 4L facemask  -  fungitell  >> nEG   -Blood cx >> NGTD 04/28  - 5/5-5/6:  overnight patient was tachy to 140s, hypotensive (around baseline), febrile to 38.5, given 1L LR bolus and then additional 500cc for elevated lactate of 3  - lactate improved overnight, tachy and febrile this am on 5/7/24, given 500cc bolus, CT showing asp pna,  - given 1x danna and vanco dose, will continue danna 1g q8  - bcx pending from this am pre-abx dose   - and bcx pending off abx from yesterday 5/5/24  - lactate improved to 1.5 overnight   - will reduce volume of diet bolus to 250 cc due to suspected gastric distention contributing to aspiration   - 5/8: last febrile to 101 at 5pm yesterday   bp stable, some improvement in tachycardia   reduced the volume of bolus feeds given high suspicion for aspiration from hiatal hernia       #Episodes of Vomiting: resolved as of 5/6/24  - On PEG feed   - Aspiration risk ; C Xray done   - Can restart the PEG feeding for now     # B/l feet ulcers  - No abscess/cellulitis  - colonized with ESBL strain  - Off loading to prevent pressure sores   - wound care following     #seizure disorder, cont meds   #Down syndrome  #Nonverbal bedbound  # PEG tube   - c/w home meds    #Progress Note Handoff    Pending :  ID followup, followup bc and labs   Family discussion: will need meeting with group home admin prior to return   Disposition: group home   code status: dnr, dni . poor prognosis

## 2024-05-08 NOTE — PROGRESS NOTE ADULT - ASSESSMENT
57yo F w/ pmhx of Down's syndrome, cerebral palsy, seizure disorder presents from [facility] for respiratory distress. Admitted to SDU for severe sepsis and acute hypoxemic respiratory failure likely secondary to recurrent CAP.     # Acute hypoxemic respiratory failure   #Severe sepsis with septic shock poa  #possible recurrent pneumonia /suspected aspiration pna ( despite having peg )   #chronic hypotension   - CXR: possible basilar opacities  - covid/RSV, MSRA, negative; Bl cx with contaminant   - C/w Midodrine 5mg  - target MAP 60 ( Patient always has BP on the softer side )   - blood cx 4/15: staph capitis  - blood cx 4/15 and 4/17: NGTD  - Scopolamine for secretions. aspiration precautions   - Palliative care following   - 4/22 and 4/23: spiked fever and WBC ;WBC downtrending (10 on 4/26)  - F/u bcx 4/23 -> no growth to date  - CT chest angio w/ contrast 4/24: bilateral opacities   - currently on 4L facemask  -  fungitell  >> nEG   -Blood cx >> NGTD 04/28  - 5/5-5/6:  overnight patient was tachy to 140s, hypotensive (around baseline), febrile to 38.5, given 1L LR bolus and then additional 500cc for elevated lactate of 3  - lactate improved overnight, tachy and febrile this am on 5/7/24, given 500cc bolus, CT showing asp pna,  - given 1x danna and vanco dose, will continue danna 1g q8  - bcx pending from this am pre-abx dose   - and bcx pending off abx from yesterday 5/5/24  - lactate improved to 1.5 overnight   - will reduce volume of diet bolus to 250 cc due to suspected gastric distention contributing to aspiration   - 5/8: last febrile to 101 at 5pm yesterday   bp stable, some improvement in tachycardia   reduced the volume of bolus feeds given high suspicion for aspiration from hiatal hernia     #Episodes of Vomiting: resolved as of 5/6/24  - On PEG feed   - Aspiration risk ; C Xray done   - Can restart the PEG feeding for now     # B/l feet ulcers  - No abscess/cellulitis  - colonized with ESBL strain  - Off loading to prevent pressure sores   - wound care following     #seizure disorder, cont meds   #Down syndrome  #Nonverbal bedbound  # PEG tube   - c/w home meds    #Progress Note Handoff    Pending :  ID followup, followup bc and labs   Family discussion: will need meeting with group home admin prior to return   Disposition: group home

## 2024-05-08 NOTE — PROGRESS NOTE ADULT - SUBJECTIVE AND OBJECTIVE BOX
24H events:    Patient is a 58y old Female who presents with a chief complaint of Pneumonia (07 May 2024 13:17)    Primary diagnosis of Acute sepsis    Today is hospital day 23d. This morning patient was seen and examined at bedside, resting comfortably in bed.    No acute or major events overnight. Hemodynamically stable, tolerating oral diet, voiding appropriately with appropriate bowel movements.     last febrile to 101 at 5pm yesterday   bp stable, some improvement in tachycardia   reduced the volume of bolus feeds given high suspicion for aspiration from hiatal hernia       Code Status:    Family communication:  Contact date:  Name of person contacted:  Relationship to patient:  Communication details:  What matters most:    PAST MEDICAL & SURGICAL HISTORY  Down syndrome    Osteoporosis    Mild anemia    Neuropathy    S/P debridement  of R hip on 3/2/21      SOCIAL HISTORY:  Social History:      ALLERGIES:  No Known Allergies    MEDICATIONS:  STANDING MEDICATIONS  albuterol/ipratropium for Nebulization 3 milliLiter(s) Nebulizer every 6 hours  AQUAPHOR (petrolatum Ointment) 1 Application(s) Topical two times a day  artificial  tears Solution 1 Drop(s) Both EYES two times a day  calcium carbonate   1250 mG (OsCal) 1 Tablet(s) Oral daily  chlorhexidine 2% Cloths 1 Application(s) Topical daily  enoxaparin Injectable 30 milliGRAM(s) SubCutaneous every 24 hours  famotidine    Tablet 20 milliGRAM(s) Oral two times a day  gabapentin Solution 300 milliGRAM(s) Oral two times a day  hydrocortisone 1% Cream 1 Application(s) Topical daily  levETIRAcetam  Solution 750 milliGRAM(s) Oral two times a day  meropenem  IVPB 1000 milliGRAM(s) IV Intermittent every 8 hours  midodrine 5 milliGRAM(s) Oral every 8 hours  raloxifene 60 milliGRAM(s) Oral daily  scopolamine 1 mG/72 Hr(s) Patch 1 Patch Transdermal every 72 hours  senna 2 Tablet(s) Oral at bedtime  silver sulfADIAZINE 1% Cream 1 Application(s) Topical two times a day    PRN MEDICATIONS  acetaminophen     Tablet .. 650 milliGRAM(s) Oral every 6 hours PRN  aluminum hydroxide/magnesium hydroxide/simethicone Suspension 30 milliLiter(s) Oral every 4 hours PRN  ibuprofen  Tablet. 400 milliGRAM(s) Oral every 6 hours PRN  melatonin 3 milliGRAM(s) Oral at bedtime PRN  ondansetron Injectable 4 milliGRAM(s) IV Push every 8 hours PRN    VITALS:   T(F): 98.1  HR: 106  BP: 95/59  RR: 20  SpO2: 96%    PHYSICAL EXAM:    GENERAL: ill apprearing, non verbal  HEAD: NCAD, no hematoma or laceration   NECK: Supple, no jvd  HEART: tachy, regular rhythm , no murmur   LUNGS: decrease bs at right base, coarse rhonchi upper airway  ABDOMEN:  soft, non-tender, peg-tube present  EXTREMITIES: b/l UE and LE contracted   NERVOUS SYSTEM: unable to follow commands       (  ) Indwelling Madsen Catheter:   Date insterted:    Reason (  ) Critical illness     (  ) urinary retention    (  ) Accurate Ins/Outs Monitoring     (  ) CMO patient    (  ) Central Line:   Date inserted:  Location: (  ) Right IJ     (  ) Left IJ     (  ) Right Fem     (  ) Left Fem    (  ) SPC        (  ) pigtail       (  ) PEG tube       (  ) colostomy       (  ) jejunostomy  (  ) U-Dall    LABS:                        8.3    16.83 )-----------( 532      ( 08 May 2024 07:24 )             27.2     05-08    139  |  100  |  17  ----------------------------<  125<H>  4.5   |  26  |  0.5<L>    Ca    8.9      08 May 2024 07:24  Mg     2.1     05-08    TPro  6.0  /  Alb  2.8<L>  /  TBili  <0.2  /  DBili  x   /  AST  18  /  ALT  23  /  AlkPhos  87  05-07    PT/INR - ( 07 May 2024 05:39 )   PT: 14.00 sec;   INR: 1.23 ratio         PTT - ( 07 May 2024 05:39 )  PTT:29.8 sec  Urinalysis Basic - ( 08 May 2024 07:24 )    Color: x / Appearance: x / SG: x / pH: x  Gluc: 125 mg/dL / Ketone: x  / Bili: x / Urobili: x   Blood: x / Protein: x / Nitrite: x   Leuk Esterase: x / RBC: x / WBC x   Sq Epi: x / Non Sq Epi: x / Bacteria: x        Lactate, Blood: 2.1 mmol/L *H* (05-08-24 @ 07:24)      Culture - Blood (collected 06 May 2024 04:20)  Source: .Blood Blood-Peripheral  Preliminary Report (07 May 2024 14:02):    No growth at 24 hours    Culture - Blood (collected 05 May 2024 17:54)  Source: .Blood Blood  Preliminary Report (08 May 2024 01:02):    No growth at 48 Hours          RADIOLOGY:    RADIOLOGY

## 2024-05-09 LAB
ALBUMIN SERPL ELPH-MCNC: 2.6 G/DL — LOW (ref 3.5–5.2)
ALP SERPL-CCNC: 89 U/L — SIGNIFICANT CHANGE UP (ref 30–115)
ALT FLD-CCNC: 24 U/L — SIGNIFICANT CHANGE UP (ref 0–41)
ANION GAP SERPL CALC-SCNC: 12 MMOL/L — SIGNIFICANT CHANGE UP (ref 7–14)
AST SERPL-CCNC: 19 U/L — SIGNIFICANT CHANGE UP (ref 0–41)
BASOPHILS # BLD AUTO: 0.04 K/UL — SIGNIFICANT CHANGE UP (ref 0–0.2)
BASOPHILS NFR BLD AUTO: 0.3 % — SIGNIFICANT CHANGE UP (ref 0–1)
BILIRUB SERPL-MCNC: <0.2 MG/DL — SIGNIFICANT CHANGE UP (ref 0.2–1.2)
BUN SERPL-MCNC: 15 MG/DL — SIGNIFICANT CHANGE UP (ref 10–20)
CALCIUM SERPL-MCNC: 9 MG/DL — SIGNIFICANT CHANGE UP (ref 8.4–10.5)
CHLORIDE SERPL-SCNC: 99 MMOL/L — SIGNIFICANT CHANGE UP (ref 98–110)
CO2 SERPL-SCNC: 27 MMOL/L — SIGNIFICANT CHANGE UP (ref 17–32)
CREAT SERPL-MCNC: 0.5 MG/DL — LOW (ref 0.7–1.5)
CULTURE RESULTS: SIGNIFICANT CHANGE UP
EGFR: 109 ML/MIN/1.73M2 — SIGNIFICANT CHANGE UP
EOSINOPHIL # BLD AUTO: 0.22 K/UL — SIGNIFICANT CHANGE UP (ref 0–0.7)
EOSINOPHIL NFR BLD AUTO: 1.5 % — SIGNIFICANT CHANGE UP (ref 0–8)
GALACTOMANNAN AG SERPL-ACNC: 0.03 INDEX — SIGNIFICANT CHANGE UP (ref 0–0.49)
GLUCOSE BLDC GLUCOMTR-MCNC: 106 MG/DL — HIGH (ref 70–99)
GLUCOSE BLDC GLUCOMTR-MCNC: 121 MG/DL — HIGH (ref 70–99)
GLUCOSE BLDC GLUCOMTR-MCNC: 130 MG/DL — HIGH (ref 70–99)
GLUCOSE BLDC GLUCOMTR-MCNC: 152 MG/DL — HIGH (ref 70–99)
GLUCOSE SERPL-MCNC: 122 MG/DL — HIGH (ref 70–99)
HCT VFR BLD CALC: 25.9 % — LOW (ref 37–47)
HGB BLD-MCNC: 8 G/DL — LOW (ref 12–16)
IMM GRANULOCYTES NFR BLD AUTO: 0.6 % — HIGH (ref 0.1–0.3)
LACTATE SERPL-SCNC: 2.2 MMOL/L — HIGH (ref 0.7–2)
LYMPHOCYTES # BLD AUTO: 1.07 K/UL — LOW (ref 1.2–3.4)
LYMPHOCYTES # BLD AUTO: 7.4 % — LOW (ref 20.5–51.1)
MAGNESIUM SERPL-MCNC: 2.2 MG/DL — SIGNIFICANT CHANGE UP (ref 1.8–2.4)
MCHC RBC-ENTMCNC: 28.9 PG — SIGNIFICANT CHANGE UP (ref 27–31)
MCHC RBC-ENTMCNC: 30.9 G/DL — LOW (ref 32–37)
MCV RBC AUTO: 93.5 FL — SIGNIFICANT CHANGE UP (ref 81–99)
MONOCYTES # BLD AUTO: 0.4 K/UL — SIGNIFICANT CHANGE UP (ref 0.1–0.6)
MONOCYTES NFR BLD AUTO: 2.8 % — SIGNIFICANT CHANGE UP (ref 1.7–9.3)
NEUTROPHILS # BLD AUTO: 12.69 K/UL — HIGH (ref 1.4–6.5)
NEUTROPHILS NFR BLD AUTO: 87.4 % — HIGH (ref 42.2–75.2)
NRBC # BLD: 0 /100 WBCS — SIGNIFICANT CHANGE UP (ref 0–0)
PLATELET # BLD AUTO: 597 K/UL — HIGH (ref 130–400)
PMV BLD: 10.2 FL — SIGNIFICANT CHANGE UP (ref 7.4–10.4)
POTASSIUM SERPL-MCNC: 4.5 MMOL/L — SIGNIFICANT CHANGE UP (ref 3.5–5)
POTASSIUM SERPL-SCNC: 4.5 MMOL/L — SIGNIFICANT CHANGE UP (ref 3.5–5)
PROT SERPL-MCNC: 5.8 G/DL — LOW (ref 6–8)
RBC # BLD: 2.77 M/UL — LOW (ref 4.2–5.4)
RBC # FLD: 21.9 % — HIGH (ref 11.5–14.5)
SODIUM SERPL-SCNC: 138 MMOL/L — SIGNIFICANT CHANGE UP (ref 135–146)
SPECIMEN SOURCE: SIGNIFICANT CHANGE UP
WBC # BLD: 14.5 K/UL — HIGH (ref 4.8–10.8)
WBC # FLD AUTO: 14.5 K/UL — HIGH (ref 4.8–10.8)

## 2024-05-09 PROCEDURE — 71045 X-RAY EXAM CHEST 1 VIEW: CPT | Mod: 26

## 2024-05-09 PROCEDURE — 99232 SBSQ HOSP IP/OBS MODERATE 35: CPT

## 2024-05-09 RX ORDER — SODIUM CHLORIDE 9 MG/ML
250 INJECTION, SOLUTION INTRAVENOUS ONCE
Refills: 0 | Status: COMPLETED | OUTPATIENT
Start: 2024-05-09 | End: 2024-05-09

## 2024-05-09 RX ADMIN — RALOXIFENE HYDROCHLORIDE 60 MILLIGRAM(S): 60 TABLET, COATED ORAL at 13:50

## 2024-05-09 RX ADMIN — LEVETIRACETAM 750 MILLIGRAM(S): 250 TABLET, FILM COATED ORAL at 17:55

## 2024-05-09 RX ADMIN — Medication 1 APPLICATION(S): at 18:01

## 2024-05-09 RX ADMIN — Medication 1 DROP(S): at 05:18

## 2024-05-09 RX ADMIN — MEROPENEM 100 MILLIGRAM(S): 1 INJECTION INTRAVENOUS at 13:55

## 2024-05-09 RX ADMIN — Medication 1 APPLICATION(S): at 05:18

## 2024-05-09 RX ADMIN — Medication 1 DROP(S): at 17:56

## 2024-05-09 RX ADMIN — SCOPALAMINE 1 PATCH: 1 PATCH, EXTENDED RELEASE TRANSDERMAL at 05:19

## 2024-05-09 RX ADMIN — MEROPENEM 100 MILLIGRAM(S): 1 INJECTION INTRAVENOUS at 05:18

## 2024-05-09 RX ADMIN — MEROPENEM 100 MILLIGRAM(S): 1 INJECTION INTRAVENOUS at 21:09

## 2024-05-09 RX ADMIN — SENNA PLUS 2 TABLET(S): 8.6 TABLET ORAL at 21:10

## 2024-05-09 RX ADMIN — Medication 3 MILLILITER(S): at 08:08

## 2024-05-09 RX ADMIN — SCOPALAMINE 1 PATCH: 1 PATCH, EXTENDED RELEASE TRANSDERMAL at 18:43

## 2024-05-09 RX ADMIN — Medication 1 APPLICATION(S): at 13:49

## 2024-05-09 RX ADMIN — Medication 1 TABLET(S): at 13:50

## 2024-05-09 RX ADMIN — GABAPENTIN 300 MILLIGRAM(S): 400 CAPSULE ORAL at 05:22

## 2024-05-09 RX ADMIN — SODIUM CHLORIDE 750 MILLILITER(S): 9 INJECTION, SOLUTION INTRAVENOUS at 11:52

## 2024-05-09 RX ADMIN — LEVETIRACETAM 750 MILLIGRAM(S): 250 TABLET, FILM COATED ORAL at 05:20

## 2024-05-09 RX ADMIN — Medication 1 APPLICATION(S): at 05:19

## 2024-05-09 RX ADMIN — MIDODRINE HYDROCHLORIDE 5 MILLIGRAM(S): 2.5 TABLET ORAL at 13:50

## 2024-05-09 RX ADMIN — GABAPENTIN 300 MILLIGRAM(S): 400 CAPSULE ORAL at 18:02

## 2024-05-09 RX ADMIN — CHLORHEXIDINE GLUCONATE 1 APPLICATION(S): 213 SOLUTION TOPICAL at 13:48

## 2024-05-09 RX ADMIN — Medication 650 MILLIGRAM(S): at 15:15

## 2024-05-09 RX ADMIN — FAMOTIDINE 20 MILLIGRAM(S): 10 INJECTION INTRAVENOUS at 17:55

## 2024-05-09 RX ADMIN — ENOXAPARIN SODIUM 30 MILLIGRAM(S): 100 INJECTION SUBCUTANEOUS at 13:50

## 2024-05-09 RX ADMIN — Medication 3 MILLILITER(S): at 20:40

## 2024-05-09 RX ADMIN — Medication 650 MILLIGRAM(S): at 14:41

## 2024-05-09 RX ADMIN — FAMOTIDINE 20 MILLIGRAM(S): 10 INJECTION INTRAVENOUS at 05:20

## 2024-05-09 RX ADMIN — SCOPALAMINE 1 PATCH: 1 PATCH, EXTENDED RELEASE TRANSDERMAL at 06:54

## 2024-05-09 RX ADMIN — Medication 1 APPLICATION(S): at 17:56

## 2024-05-09 RX ADMIN — Medication 3 MILLILITER(S): at 13:08

## 2024-05-09 RX ADMIN — MIDODRINE HYDROCHLORIDE 5 MILLIGRAM(S): 2.5 TABLET ORAL at 05:20

## 2024-05-09 RX ADMIN — MIDODRINE HYDROCHLORIDE 5 MILLIGRAM(S): 2.5 TABLET ORAL at 21:09

## 2024-05-09 NOTE — PROGRESS NOTE ADULT - ASSESSMENT
59yo F w/ pmhx of Down's syndrome, cerebral palsy, seizure disorder presents from [facility] for respiratory distress. Admitted to SDU for severe sepsis and acute hypoxemic respiratory failure likely secondary to recurrent CAP.     # Acute hypoxemic respiratory failure   #Severe sepsis with septic shock poa  #possible recurrent pneumonia /suspected aspiration pna ( despite having peg )   #chronic hypotension   - CXR: possible basilar opacities  - covid/RSV, MSRA, negative; Bl cx with contaminant   - C/w Midodrine 5mg  - target MAP 60 ( Patient always has BP on the softer side )   - blood cx 4/15: staph capitis  - blood cx 4/15 and 4/17: NGTD  - Scopolamine for secretions. aspiration precautions   - Palliative care following   - 4/22 and 4/23: spiked fever and WBC ;WBC downtrending (10 on 4/26)  - F/u bcx 4/23 -> no growth to date  - CT chest angio w/ contrast 4/24: bilateral opacities   - currently on 4L facemask  -  fungitell  >> nEG   -Blood cx >> NGTD 04/28  - 5/5-5/6:  overnight patient was tachy to 140s, hypotensive (around baseline), febrile to 38.5, given 1L LR bolus and then additional 500cc for elevated lactate of 3  - lactate improved overnight, tachy and febrile this am on 5/7/24, given 500cc bolus, CT showing asp pna,  - given 1x danna and vanco dose, will continue danna 1g q8  - bcx pending from this am pre-abx dose   - and bcx pending off abx from yesterday 5/5/24  - lactate improved to 1.5 overnight   - will reduce volume of diet bolus to 250 cc due to suspected gastric distention contributing to aspiration   - 5/8: last febrile to 101 at 5pm yesterday   bp stable, some improvement in tachycardia   reduced the volume of bolus feeds given high suspicion for aspiration from hiatal hernia       #Episodes of Vomiting: resolved as of 5/6/24  - On PEG feed   - Aspiration risk ; C Xray done   - Can restart the PEG feeding for now     # B/l feet ulcers  - No abscess/cellulitis  - colonized with ESBL strain  - Off loading to prevent pressure sores   - wound care following     #seizure disorder, cont meds   #Down syndrome  #Nonverbal bedbound  # PEG tube   - c/w home meds    #Progress Note Handoff    Pending :  ID followup, followup bc and labs   Family discussion: will need meeting with group home admin prior to return   Disposition: group home   code status: dnr, dni . poor prognosis

## 2024-05-09 NOTE — PROGRESS NOTE ADULT - SUBJECTIVE AND OBJECTIVE BOX
24H events:    Patient is a 58y old Female who presents with a chief complaint of Pneumonia (08 May 2024 14:06)    Primary diagnosis of Acute sepsis    Today is hospital day 24d. This morning patient was seen and examined at bedside, resting comfortably in bed.    No acute or major events overnight. Hemodynamically stable, tolerating oral diet, voiding appropriately with appropriate bowel movements.     Code Status:    Family communication:  Contact date:  Name of person contacted:  Relationship to patient:  Communication details:  What matters most:    PAST MEDICAL & SURGICAL HISTORY  Down syndrome    Osteoporosis    Mild anemia    Neuropathy    S/P debridement  of R hip on 3/2/21      SOCIAL HISTORY:  Social History:      ALLERGIES:  No Known Allergies    MEDICATIONS:  STANDING MEDICATIONS  albuterol/ipratropium for Nebulization 3 milliLiter(s) Nebulizer every 6 hours  AQUAPHOR (petrolatum Ointment) 1 Application(s) Topical two times a day  artificial  tears Solution 1 Drop(s) Both EYES two times a day  calcium carbonate   1250 mG (OsCal) 1 Tablet(s) Oral daily  chlorhexidine 2% Cloths 1 Application(s) Topical daily  enoxaparin Injectable 30 milliGRAM(s) SubCutaneous every 24 hours  famotidine    Tablet 20 milliGRAM(s) Oral two times a day  gabapentin Solution 300 milliGRAM(s) Oral two times a day  hydrocortisone 1% Cream 1 Application(s) Topical daily  levETIRAcetam  Solution 750 milliGRAM(s) Oral two times a day  meropenem  IVPB 1000 milliGRAM(s) IV Intermittent every 8 hours  midodrine 5 milliGRAM(s) Oral every 8 hours  raloxifene 60 milliGRAM(s) Oral daily  scopolamine 1 mG/72 Hr(s) Patch 1 Patch Transdermal every 72 hours  senna 2 Tablet(s) Oral at bedtime  silver sulfADIAZINE 1% Cream 1 Application(s) Topical two times a day    PRN MEDICATIONS  acetaminophen     Tablet .. 650 milliGRAM(s) Oral every 6 hours PRN  aluminum hydroxide/magnesium hydroxide/simethicone Suspension 30 milliLiter(s) Oral every 4 hours PRN  ibuprofen  Tablet. 400 milliGRAM(s) Oral every 6 hours PRN  melatonin 3 milliGRAM(s) Oral at bedtime PRN  ondansetron Injectable 4 milliGRAM(s) IV Push every 8 hours PRN    VITALS:   T(F): 99.3  HR: 106  BP: 83/53  RR: 19  SpO2: 88%    PHYSICAL EXAM:    GENERAL: ill apprearing, non verbal  HEAD: NCAD, no hematoma or laceration   NECK: Supple, no jvd  HEART: tachy, regular rhythm , no murmur   LUNGS: decrease bs at right base, coarse rhonchi upper airway  ABDOMEN:  soft, non-tender, peg-tube present  EXTREMITIES: b/l UE and LE contracted   NERVOUS SYSTEM: unable to follow commands       (  ) Indwelling Madsen Catheter:   Date insterted:    Reason (  ) Critical illness     (  ) urinary retention    (  ) Accurate Ins/Outs Monitoring     (  ) CMO patient    (  ) Central Line:   Date inserted:  Location: (  ) Right IJ     (  ) Left IJ     (  ) Right Fem     (  ) Left Fem    (  ) SPC        (  ) pigtail       (  ) PEG tube       (  ) colostomy       (  ) jejunostomy  (  ) U-Dall    LABS:                        8.0    14.50 )-----------( 597      ( 09 May 2024 06:58 )             25.9     05-09    138  |  99  |  15  ----------------------------<  122<H>  4.5   |  27  |  0.5<L>    Ca    9.0      09 May 2024 06:58  Mg     2.2     05-09    TPro  5.8<L>  /  Alb  2.6<L>  /  TBili  <0.2  /  DBili  x   /  AST  19  /  ALT  24  /  AlkPhos  89  05-09      Urinalysis Basic - ( 09 May 2024 06:58 )    Color: x / Appearance: x / SG: x / pH: x  Gluc: 122 mg/dL / Ketone: x  / Bili: x / Urobili: x   Blood: x / Protein: x / Nitrite: x   Leuk Esterase: x / RBC: x / WBC x   Sq Epi: x / Non Sq Epi: x / Bacteria: x        Lactate, Blood: 2.2 mmol/L *H* (05-09-24 @ 06:58)          RADIOLOGY:    RADIOLOGY

## 2024-05-09 NOTE — PROGRESS NOTE ADULT - ASSESSMENT
59yo F w/ pmhx of Down's syndrome, cerebral palsy, seizure disorder presents from [facility] for respiratory distress. Admitted to SDU for severe sepsis and acute hypoxemic respiratory failure likely secondary to recurrent CAP.     # Acute hypoxemic respiratory failure   #Severe sepsis with septic shock poa  #possible recurrent pneumonia /suspected aspiration pna ( despite having peg )   #chronic hypotension   - CXR: possible basilar opacities  - covid/RSV, MSRA, negative; Bl cx with contaminant   - C/w Midodrine 5mg  - target MAP 60 ( Patient always has BP on the softer side )   - blood cx 4/15: staph capitis  - blood cx 4/15 and 4/17: NGTD  - Scopolamine for secretions. aspiration precautions   - Palliative care following   - 4/22 and 4/23: spiked fever and WBC ;WBC downtrending (10 on 4/26)  - F/u bcx 4/23 -> no growth to date  - CT chest angio w/ contrast 4/24: bilateral opacities   - currently on 4L facemask  -  fungitell  >> nEG   -Blood cx >> NGTD 04/28  - 5/5-5/6:  overnight patient was tachy to 140s, hypotensive (around baseline), febrile to 38.5, given 1L LR bolus and then additional 500cc for elevated lactate of 3  - lactate improved overnight, tachy and febrile this am on 5/7/24, given 500cc bolus, CT showing asp pna,  - given 1x danna and vanco dose, will continue danna 1g q8  - bcx pending from this am pre-abx dose   - and bcx pending off abx from yesterday 5/5/24  - lactate improved to 1.5 overnight   - will reduce volume of diet bolus to 250 cc due to suspected gastric distention contributing to aspiration   - 5/9: Tmax 100.5   bp stable, some improvement in tachycardia   reduced the volume of bolus feeds given high suspicion for aspiration from hiatal hernia     #Episodes of Vomiting: resolved as of 5/6/24  - On PEG feed   - Aspiration risk ; C Xray done   - Can restart the PEG feeding for now     # B/l feet ulcers  - No abscess/cellulitis  - colonized with ESBL strain  - Off loading to prevent pressure sores   - wound care following     #seizure disorder, cont meds   #Down syndrome  #Nonverbal bedbound  # PEG tube   - c/w home meds    #Progress Note Handoff    Pending : Clinical improvement / Fever curve   Family discussion: will need meeting with group home admin prior to return   Disposition: group home

## 2024-05-09 NOTE — PROGRESS NOTE ADULT - SUBJECTIVE AND OBJECTIVE BOX
TEA ECHAVARRIA  58y  Female      Patient is a 58y old  Female who presents with a chief complaint of Pneumonia.       INTERVAL HPI/OVERNIGHT EVENTS:      ******************************* REVIEW OF SYSTEMS:**********************************************    All other review of systems negative    *********************** VITALS ******************************************    T(F): 100.5 (05-09-24 @ 14:41)  HR: 113 (05-09-24 @ 14:41) (106 - 122)  BP: 84/50 (05-09-24 @ 14:41) (83/53 - 86/59)  RR: 20 (05-09-24 @ 14:41) (19 - 20)  SpO2: 92% (05-09-24 @ 14:41) (88% - 95%)    05-08-24 @ 07:01  -  05-09-24 @ 07:00  --------------------------------------------------------  IN: 350 mL / OUT: 1 mL / NET: 349 mL            05-08-24 @ 07:01  -  05-09-24 @ 07:00  --------------------------------------------------------  IN: 350 mL / OUT: 1 mL / NET: 349 mL        ******************************** PHYSICAL EXAM:**************************************************  GENERAL: NAD    PSYCH: no agitation, baseline mentation  HEENT:     NERVOUS SYSTEM:  Alert & awake x2   PULMONARY: MARIE, CTA    CARDIOVASCULAR: S1S2 RRR    GI: Soft, NT, ND; BS present. PEG in place.     EXTREMITIES:  2+ Peripheral Pulses, No clubbing, cyanosis, or edema    LYMPH: No lymphadenopathy noted    SKIN: No rashes or lesions      **************************** LABS *******************************************************                          8.0    14.50 )-----------( 597      ( 09 May 2024 06:58 )             25.9     05-09    138  |  99  |  15  ----------------------------<  122<H>  4.5   |  27  |  0.5<L>    Ca    9.0      09 May 2024 06:58  Mg     2.2     05-09    TPro  5.8<L>  /  Alb  2.6<L>  /  TBili  <0.2  /  DBili  x   /  AST  19  /  ALT  24  /  AlkPhos  89  05-09      Urinalysis Basic - ( 09 May 2024 06:58 )    Color: x / Appearance: x / SG: x / pH: x  Gluc: 122 mg/dL / Ketone: x  / Bili: x / Urobili: x   Blood: x / Protein: x / Nitrite: x   Leuk Esterase: x / RBC: x / WBC x   Sq Epi: x / Non Sq Epi: x / Bacteria: x        Lactate Trend  05-09 @ 06:58 Lactate:2.2   05-08 @ 07:24 Lactate:2.1   05-07 @ 05:39 Lactate:1.5   05-06 @ 21:54 Lactate:1.5   05-06 @ 12:39 Lactate:2.7   05-06 @ 04:20 Lactate:3.0         CAPILLARY BLOOD GLUCOSE      POCT Blood Glucose.: 106 mg/dL (09 May 2024 13:56)          **************************Active Medications *******************************************  No Known Allergies      acetaminophen     Tablet .. 650 milliGRAM(s) Oral every 6 hours PRN  albuterol/ipratropium for Nebulization 3 milliLiter(s) Nebulizer every 6 hours  aluminum hydroxide/magnesium hydroxide/simethicone Suspension 30 milliLiter(s) Oral every 4 hours PRN  AQUAPHOR (petrolatum Ointment) 1 Application(s) Topical two times a day  artificial  tears Solution 1 Drop(s) Both EYES two times a day  calcium carbonate   1250 mG (OsCal) 1 Tablet(s) Oral daily  chlorhexidine 2% Cloths 1 Application(s) Topical daily  enoxaparin Injectable 30 milliGRAM(s) SubCutaneous every 24 hours  famotidine    Tablet 20 milliGRAM(s) Oral two times a day  gabapentin Solution 300 milliGRAM(s) Oral two times a day  hydrocortisone 1% Cream 1 Application(s) Topical daily  ibuprofen  Tablet. 400 milliGRAM(s) Oral every 6 hours PRN  levETIRAcetam  Solution 750 milliGRAM(s) Oral two times a day  melatonin 3 milliGRAM(s) Oral at bedtime PRN  meropenem  IVPB 1000 milliGRAM(s) IV Intermittent every 8 hours  midodrine 5 milliGRAM(s) Oral every 8 hours  ondansetron Injectable 4 milliGRAM(s) IV Push every 8 hours PRN  raloxifene 60 milliGRAM(s) Oral daily  scopolamine 1 mG/72 Hr(s) Patch 1 Patch Transdermal every 72 hours  senna 2 Tablet(s) Oral at bedtime  silver sulfADIAZINE 1% Cream 1 Application(s) Topical two times a day      ***************************************************  RADIOLOGY & ADDITIONAL TESTS:    Imaging Personally Reviewed:  [ ] YES  [ ] NO    HEALTH ISSUES - PROBLEM Dx:  Septic shock    Acute respiratory failure with hypoxia    Advanced care planning/counseling discussion    Encounter for palliative care

## 2024-05-10 LAB
ALBUMIN SERPL ELPH-MCNC: 2.5 G/DL — LOW (ref 3.5–5.2)
ALP SERPL-CCNC: 92 U/L — SIGNIFICANT CHANGE UP (ref 30–115)
ALT FLD-CCNC: 20 U/L — SIGNIFICANT CHANGE UP (ref 0–41)
ANION GAP SERPL CALC-SCNC: 12 MMOL/L — SIGNIFICANT CHANGE UP (ref 7–14)
AST SERPL-CCNC: 13 U/L — SIGNIFICANT CHANGE UP (ref 0–41)
BASOPHILS # BLD AUTO: 0.05 K/UL — SIGNIFICANT CHANGE UP (ref 0–0.2)
BASOPHILS NFR BLD AUTO: 0.5 % — SIGNIFICANT CHANGE UP (ref 0–1)
BILIRUB SERPL-MCNC: <0.2 MG/DL — SIGNIFICANT CHANGE UP (ref 0.2–1.2)
BUN SERPL-MCNC: 14 MG/DL — SIGNIFICANT CHANGE UP (ref 10–20)
CALCIUM SERPL-MCNC: 8.8 MG/DL — SIGNIFICANT CHANGE UP (ref 8.4–10.4)
CHLORIDE SERPL-SCNC: 99 MMOL/L — SIGNIFICANT CHANGE UP (ref 98–110)
CO2 SERPL-SCNC: 27 MMOL/L — SIGNIFICANT CHANGE UP (ref 17–32)
CREAT SERPL-MCNC: 0.5 MG/DL — LOW (ref 0.7–1.5)
EGFR: 109 ML/MIN/1.73M2 — SIGNIFICANT CHANGE UP
EOSINOPHIL # BLD AUTO: 0.27 K/UL — SIGNIFICANT CHANGE UP (ref 0–0.7)
EOSINOPHIL NFR BLD AUTO: 2.4 % — SIGNIFICANT CHANGE UP (ref 0–8)
GLUCOSE BLDC GLUCOMTR-MCNC: 112 MG/DL — HIGH (ref 70–99)
GLUCOSE SERPL-MCNC: 137 MG/DL — HIGH (ref 70–99)
HCT VFR BLD CALC: 27.9 % — LOW (ref 37–47)
HGB BLD-MCNC: 8.5 G/DL — LOW (ref 12–16)
IMM GRANULOCYTES NFR BLD AUTO: 0.6 % — HIGH (ref 0.1–0.3)
LYMPHOCYTES # BLD AUTO: 1.21 K/UL — SIGNIFICANT CHANGE UP (ref 1.2–3.4)
LYMPHOCYTES # BLD AUTO: 11 % — LOW (ref 20.5–51.1)
MAGNESIUM SERPL-MCNC: 2.2 MG/DL — SIGNIFICANT CHANGE UP (ref 1.8–2.4)
MCHC RBC-ENTMCNC: 28.9 PG — SIGNIFICANT CHANGE UP (ref 27–31)
MCHC RBC-ENTMCNC: 30.5 G/DL — LOW (ref 32–37)
MCV RBC AUTO: 94.9 FL — SIGNIFICANT CHANGE UP (ref 81–99)
MONOCYTES # BLD AUTO: 0.42 K/UL — SIGNIFICANT CHANGE UP (ref 0.1–0.6)
MONOCYTES NFR BLD AUTO: 3.8 % — SIGNIFICANT CHANGE UP (ref 1.7–9.3)
NEUTROPHILS # BLD AUTO: 9.01 K/UL — HIGH (ref 1.4–6.5)
NEUTROPHILS NFR BLD AUTO: 81.7 % — HIGH (ref 42.2–75.2)
NRBC # BLD: 0 /100 WBCS — SIGNIFICANT CHANGE UP (ref 0–0)
PLATELET # BLD AUTO: 620 K/UL — HIGH (ref 130–400)
PMV BLD: 9.9 FL — SIGNIFICANT CHANGE UP (ref 7.4–10.4)
POTASSIUM SERPL-MCNC: 4.5 MMOL/L — SIGNIFICANT CHANGE UP (ref 3.5–5)
POTASSIUM SERPL-SCNC: 4.5 MMOL/L — SIGNIFICANT CHANGE UP (ref 3.5–5)
PROT SERPL-MCNC: 5.8 G/DL — LOW (ref 6–8)
RBC # BLD: 2.94 M/UL — LOW (ref 4.2–5.4)
RBC # FLD: 21.6 % — HIGH (ref 11.5–14.5)
SODIUM SERPL-SCNC: 138 MMOL/L — SIGNIFICANT CHANGE UP (ref 135–146)
WBC # BLD: 11.03 K/UL — HIGH (ref 4.8–10.8)
WBC # FLD AUTO: 11.03 K/UL — HIGH (ref 4.8–10.8)

## 2024-05-10 PROCEDURE — 99232 SBSQ HOSP IP/OBS MODERATE 35: CPT

## 2024-05-10 RX ORDER — SODIUM CHLORIDE 9 MG/ML
500 INJECTION, SOLUTION INTRAVENOUS ONCE
Refills: 0 | Status: COMPLETED | OUTPATIENT
Start: 2024-05-10 | End: 2024-05-10

## 2024-05-10 RX ORDER — SODIUM CHLORIDE 9 MG/ML
250 INJECTION, SOLUTION INTRAVENOUS ONCE
Refills: 0 | Status: COMPLETED | OUTPATIENT
Start: 2024-05-10 | End: 2024-05-10

## 2024-05-10 RX ADMIN — SODIUM CHLORIDE 750 MILLILITER(S): 9 INJECTION, SOLUTION INTRAVENOUS at 13:19

## 2024-05-10 RX ADMIN — Medication 1 APPLICATION(S): at 18:24

## 2024-05-10 RX ADMIN — Medication 3 MILLILITER(S): at 08:45

## 2024-05-10 RX ADMIN — Medication 650 MILLIGRAM(S): at 06:42

## 2024-05-10 RX ADMIN — MIDODRINE HYDROCHLORIDE 5 MILLIGRAM(S): 2.5 TABLET ORAL at 05:24

## 2024-05-10 RX ADMIN — FAMOTIDINE 20 MILLIGRAM(S): 10 INJECTION INTRAVENOUS at 05:24

## 2024-05-10 RX ADMIN — MIDODRINE HYDROCHLORIDE 5 MILLIGRAM(S): 2.5 TABLET ORAL at 12:45

## 2024-05-10 RX ADMIN — MEROPENEM 100 MILLIGRAM(S): 1 INJECTION INTRAVENOUS at 22:03

## 2024-05-10 RX ADMIN — Medication 1 APPLICATION(S): at 05:25

## 2024-05-10 RX ADMIN — MIDODRINE HYDROCHLORIDE 5 MILLIGRAM(S): 2.5 TABLET ORAL at 22:03

## 2024-05-10 RX ADMIN — GABAPENTIN 300 MILLIGRAM(S): 400 CAPSULE ORAL at 06:09

## 2024-05-10 RX ADMIN — MEROPENEM 100 MILLIGRAM(S): 1 INJECTION INTRAVENOUS at 05:23

## 2024-05-10 RX ADMIN — Medication 1 APPLICATION(S): at 12:42

## 2024-05-10 RX ADMIN — GABAPENTIN 300 MILLIGRAM(S): 400 CAPSULE ORAL at 18:23

## 2024-05-10 RX ADMIN — Medication 1 DROP(S): at 05:22

## 2024-05-10 RX ADMIN — Medication 3 MILLILITER(S): at 13:29

## 2024-05-10 RX ADMIN — LEVETIRACETAM 750 MILLIGRAM(S): 250 TABLET, FILM COATED ORAL at 18:28

## 2024-05-10 RX ADMIN — SCOPALAMINE 1 PATCH: 1 PATCH, EXTENDED RELEASE TRANSDERMAL at 18:44

## 2024-05-10 RX ADMIN — SENNA PLUS 2 TABLET(S): 8.6 TABLET ORAL at 22:03

## 2024-05-10 RX ADMIN — RALOXIFENE HYDROCHLORIDE 60 MILLIGRAM(S): 60 TABLET, COATED ORAL at 12:42

## 2024-05-10 RX ADMIN — MEROPENEM 100 MILLIGRAM(S): 1 INJECTION INTRAVENOUS at 13:48

## 2024-05-10 RX ADMIN — LEVETIRACETAM 750 MILLIGRAM(S): 250 TABLET, FILM COATED ORAL at 05:24

## 2024-05-10 RX ADMIN — Medication 1 DROP(S): at 18:25

## 2024-05-10 RX ADMIN — CHLORHEXIDINE GLUCONATE 1 APPLICATION(S): 213 SOLUTION TOPICAL at 12:42

## 2024-05-10 RX ADMIN — SCOPALAMINE 1 PATCH: 1 PATCH, EXTENDED RELEASE TRANSDERMAL at 06:10

## 2024-05-10 RX ADMIN — FAMOTIDINE 20 MILLIGRAM(S): 10 INJECTION INTRAVENOUS at 18:23

## 2024-05-10 RX ADMIN — Medication 1 TABLET(S): at 12:41

## 2024-05-10 RX ADMIN — Medication 1 APPLICATION(S): at 05:23

## 2024-05-10 RX ADMIN — Medication 1 APPLICATION(S): at 18:25

## 2024-05-10 RX ADMIN — SODIUM CHLORIDE 1000 MILLILITER(S): 9 INJECTION, SOLUTION INTRAVENOUS at 05:22

## 2024-05-10 RX ADMIN — Medication 3 MILLILITER(S): at 20:52

## 2024-05-10 RX ADMIN — ENOXAPARIN SODIUM 30 MILLIGRAM(S): 100 INJECTION SUBCUTANEOUS at 12:41

## 2024-05-10 NOTE — PROGRESS NOTE ADULT - ASSESSMENT
· Assessment	  58 MARÍA w a PMH of Trisomy 21, a recent admission [id number] for RSV PNA w MICU stay (never intubated), nonverbal at baseline, hypotension, cerebral palsey w seizure disorder, ESBL isolated from ulcer of rt foot 11/23 [facility] and high fever. Patient SOA w , RR 22 and febrile to 102.8 rectally. Patient also hypotensive to 82/51. Labs notable for leukocytosis of 12.25 w 3.6% bands, large platelets and a compensated Respiratory acidosis. Imaging notable for a Rt Mid lobe ground glass opacification. Patient MRSA Negative w RVP negative for COVID, RSV and Flu.     < from: CT Chest w/ IV Cont (05.06.24 @ 23:32) >  Bilateral areas of consolidation both lower lobes.  Patchy opacity both lungs, right greater than left, similar to earlier   study. Interval resolution of previous small effusions.    Markedly distended urinary bladder with dependent debris, possible   hemorrhage. No bladder calculi. (4/128). Correlate with urinalysis.    < end of copied text >      IMPRESSION/RECOMMENDATIONS  Aspiration with suspected GNR  pneumonia  5/6 CT with b/l opacities  CXR increased opacities right  4/28,5/3,4,6  BCx NG  WBC 18>11  Fungitell assay 53 on 4/24  Immunosuppression could result in poor clinical outcome.   Nares ORSA NG  RVP NG  4/15 Bcx 1/2 CoNS > not a true pathogen  Was colonized with ESBL strain in feet wounds  Nares ORSA NG  COVID 19/ Influenza/ RSV NG.   Feet with pressure related ulcers. No abscess/cellulitis  -no change in Meropenem 1 gm iv q8h

## 2024-05-10 NOTE — PROGRESS NOTE ADULT - SUBJECTIVE AND OBJECTIVE BOX
24H events:    Patient is a 58y old Female who presents with a chief complaint of Pneumonia (09 May 2024 15:23)    Primary diagnosis of Acute sepsis    Today is hospital day 25d. This morning patient was seen and examined at bedside, resting comfortably in bed.    No acute or major events overnight. Hemodynamically stable, tolerating oral diet, voiding appropriately with appropriate bowel movements.     tmax 100.5 in last 24 hours   hypotensive to 86/44, 500cc LR bolus w/improvement to 101/60s  f/u GI regarding feeding tube plan, g-tube asp risk vs j-tube    Code Status:    Family communication:  Contact date:  Name of person contacted:  Relationship to patient:  Communication details:  What matters most:    PAST MEDICAL & SURGICAL HISTORY  Down syndrome    Osteoporosis    Mild anemia    Neuropathy    S/P debridement  of R hip on 3/2/21      SOCIAL HISTORY:  Social History:      ALLERGIES:  No Known Allergies    MEDICATIONS:  STANDING MEDICATIONS  albuterol/ipratropium for Nebulization 3 milliLiter(s) Nebulizer every 6 hours  AQUAPHOR (petrolatum Ointment) 1 Application(s) Topical two times a day  artificial  tears Solution 1 Drop(s) Both EYES two times a day  calcium carbonate   1250 mG (OsCal) 1 Tablet(s) Oral daily  chlorhexidine 2% Cloths 1 Application(s) Topical daily  enoxaparin Injectable 30 milliGRAM(s) SubCutaneous every 24 hours  famotidine    Tablet 20 milliGRAM(s) Oral two times a day  gabapentin Solution 300 milliGRAM(s) Oral two times a day  hydrocortisone 1% Cream 1 Application(s) Topical daily  levETIRAcetam  Solution 750 milliGRAM(s) Oral two times a day  meropenem  IVPB 1000 milliGRAM(s) IV Intermittent every 8 hours  midodrine 5 milliGRAM(s) Oral every 8 hours  raloxifene 60 milliGRAM(s) Oral daily  scopolamine 1 mG/72 Hr(s) Patch 1 Patch Transdermal every 72 hours  senna 2 Tablet(s) Oral at bedtime  silver sulfADIAZINE 1% Cream 1 Application(s) Topical two times a day    PRN MEDICATIONS  acetaminophen     Tablet .. 650 milliGRAM(s) Oral every 6 hours PRN  aluminum hydroxide/magnesium hydroxide/simethicone Suspension 30 milliLiter(s) Oral every 4 hours PRN  ibuprofen  Tablet. 400 milliGRAM(s) Oral every 6 hours PRN  melatonin 3 milliGRAM(s) Oral at bedtime PRN  ondansetron Injectable 4 milliGRAM(s) IV Push every 8 hours PRN    VITALS:   T(F): 100.1  HR: 66  BP: 101/63  RR: 18  SpO2: 95%    PHYSICAL EXAM:    GENERAL: ill apprearing, non verbal  HEAD: NCAD, no hematoma or laceration   NECK: Supple, no jvd  HEART: tachy, regular rhythm , no murmur   LUNGS: decrease bs at right base, coarse rhonchi upper airway  ABDOMEN:  soft, non-tender, peg-tube present  EXTREMITIES: b/l UE and LE contracted   NERVOUS SYSTEM: unable to follow commands         (  ) Indwelling Madsen Catheter:   Date insterted:    Reason (  ) Critical illness     (  ) urinary retention    (  ) Accurate Ins/Outs Monitoring     (  ) CMO patient    (  ) Central Line:   Date inserted:  Location: (  ) Right IJ     (  ) Left IJ     (  ) Right Fem     (  ) Left Fem    (  ) SPC        (  ) pigtail       (  ) PEG tube       (  ) colostomy       (  ) jejunostomy  (  ) U-Dall    LABS:                        8.5    11.03 )-----------( 620      ( 10 May 2024 06:07 )             27.9     05-10    138  |  99  |  14  ----------------------------<  137<H>  4.5   |  27  |  0.5<L>    Ca    8.8      10 May 2024 06:07  Mg     2.2     05-10    TPro  5.8<L>  /  Alb  2.5<L>  /  TBili  <0.2  /  DBili  x   /  AST  13  /  ALT  20  /  AlkPhos  92  05-10      Urinalysis Basic - ( 10 May 2024 06:07 )    Color: x / Appearance: x / SG: x / pH: x  Gluc: 137 mg/dL / Ketone: x  / Bili: x / Urobili: x   Blood: x / Protein: x / Nitrite: x   Leuk Esterase: x / RBC: x / WBC x   Sq Epi: x / Non Sq Epi: x / Bacteria: x                RADIOLOGY:    RADIOLOGY

## 2024-05-10 NOTE — PROGRESS NOTE ADULT - SUBJECTIVE AND OBJECTIVE BOX
JERICHOTEA  58y, Female    All available historical data reviewed    OVERNIGHT EVENTS:  low grade fevers  NC 2 LIT    ROS:  unable to obtain history secondary to patient's mental status     VITALS:  T(F): 100.1, Max: 100.5 (05-09-24 @ 14:41)  HR: 66  BP: 101/63  RR: 18Vital Signs Last 24 Hrs  T(C): 37.8 (10 May 2024 06:33), Max: 38.1 (09 May 2024 14:41)  T(F): 100.1 (10 May 2024 06:33), Max: 100.5 (09 May 2024 14:41)  HR: 66 (10 May 2024 06:33) (66 - 113)  BP: 101/63 (10 May 2024 06:33) (82/51 - 101/63)  BP(mean): 61 (09 May 2024 14:41) (61 - 61)  RR: 18 (10 May 2024 04:56) (18 - 20)  SpO2: 95% (10 May 2024 04:56) (92% - 95%)    Parameters below as of 10 May 2024 04:56  Patient On (Oxygen Delivery Method): nasal cannula        TESTS & MEASUREMENTS:                        8.5    11.03 )-----------( 620      ( 10 May 2024 06:07 )             27.9     05-10    138  |  99  |  14  ----------------------------<  137<H>  4.5   |  27  |  0.5<L>    Ca    8.8      10 May 2024 06:07  Mg     2.2     05-10    TPro  5.8<L>  /  Alb  2.5<L>  /  TBili  <0.2  /  DBili  x   /  AST  13  /  ALT  20  /  AlkPhos  92  05-10    LIVER FUNCTIONS - ( 10 May 2024 06:07 )  Alb: 2.5 g/dL / Pro: 5.8 g/dL / ALK PHOS: 92 U/L / ALT: 20 U/L / AST: 13 U/L / GGT: x             Culture - Blood (collected 05-06-24 @ 04:20)  Source: .Blood Blood-Peripheral  Preliminary Report (05-09-24 @ 14:01):    No growth at 72 Hours    Culture - Blood (collected 05-05-24 @ 17:54)  Source: .Blood Blood  Preliminary Report (05-10-24 @ 01:01):    No growth at 4 days    Culture - Blood (collected 05-04-24 @ 07:07)  Source: .Blood None  Final Report (05-09-24 @ 18:00):    No growth at 5 days    Culture - Blood (collected 05-03-24 @ 18:00)  Source: .Blood Blood  Final Report (05-08-24 @ 23:10):    No growth at 5 days      Urinalysis Basic - ( 10 May 2024 06:07 )    Color: x / Appearance: x / SG: x / pH: x  Gluc: 137 mg/dL / Ketone: x  / Bili: x / Urobili: x   Blood: x / Protein: x / Nitrite: x   Leuk Esterase: x / RBC: x / WBC x   Sq Epi: x / Non Sq Epi: x / Bacteria: x          RADIOLOGY & ADDITIONAL TESTS:  Personal review of radiological diagnostics performed  Echo and EKG results noted when applicable.     MEDICATIONS:  acetaminophen     Tablet .. 650 milliGRAM(s) Oral every 6 hours PRN  albuterol/ipratropium for Nebulization 3 milliLiter(s) Nebulizer every 6 hours  aluminum hydroxide/magnesium hydroxide/simethicone Suspension 30 milliLiter(s) Oral every 4 hours PRN  AQUAPHOR (petrolatum Ointment) 1 Application(s) Topical two times a day  artificial  tears Solution 1 Drop(s) Both EYES two times a day  calcium carbonate   1250 mG (OsCal) 1 Tablet(s) Oral daily  chlorhexidine 2% Cloths 1 Application(s) Topical daily  enoxaparin Injectable 30 milliGRAM(s) SubCutaneous every 24 hours  famotidine    Tablet 20 milliGRAM(s) Oral two times a day  gabapentin Solution 300 milliGRAM(s) Oral two times a day  hydrocortisone 1% Cream 1 Application(s) Topical daily  ibuprofen  Tablet. 400 milliGRAM(s) Oral every 6 hours PRN  levETIRAcetam  Solution 750 milliGRAM(s) Oral two times a day  melatonin 3 milliGRAM(s) Oral at bedtime PRN  meropenem  IVPB 1000 milliGRAM(s) IV Intermittent every 8 hours  midodrine 5 milliGRAM(s) Oral every 8 hours  ondansetron Injectable 4 milliGRAM(s) IV Push every 8 hours PRN  raloxifene 60 milliGRAM(s) Oral daily  scopolamine 1 mG/72 Hr(s) Patch 1 Patch Transdermal every 72 hours  senna 2 Tablet(s) Oral at bedtime  silver sulfADIAZINE 1% Cream 1 Application(s) Topical two times a day      ANTIBIOTICS:  meropenem  IVPB 1000 milliGRAM(s) IV Intermittent every 8 hours

## 2024-05-10 NOTE — PROGRESS NOTE ADULT - GASTROINTESTINAL
soft/nontender/no guarding/no rigidity
soft/nontender/no guarding/no rigidity
normal/soft/nontender/nondistended/normal active bowel sounds
soft/nontender/no guarding/no rigidity/distended
soft/nontender/no guarding/no rigidity
normal/soft/nontender/nondistended/normal active bowel sounds
normal/soft/nontender/nondistended/normal active bowel sounds
soft/nontender/no guarding/distended/bowel sounds hypoactive

## 2024-05-10 NOTE — PROGRESS NOTE ADULT - SUBJECTIVE AND OBJECTIVE BOX
TEA ECHAVARRIA  58y  Female      Patient is a 58y old  Female who presents with a chief complaint of Pneumonia,.       INTERVAL HPI/OVERNIGHT EVENTS:      ******************************* REVIEW OF SYSTEMS:**********************************************    All other review of systems negative    *********************** VITALS ******************************************    T(F): 98.4 (05-10-24 @ 12:10)  HR: 58 (05-10-24 @ 12:10) (58 - 113)  BP: 79/47 (05-10-24 @ 12:10) (79/47 - 101/63)  RR: 18 (05-10-24 @ 12:10) (18 - 20)  SpO2: 95% (05-10-24 @ 12:10) (92% - 95%)            ******************************** PHYSICAL EXAM:**************************************************  GENERAL: NAD    PSYCH: no agitation, baseline mentation  HEENT:     NERVOUS SYSTEM:  Alert & awake x 2     PULMONARY: MARIE, CTA    CARDIOVASCULAR: S1S2 RRR    GI: Soft, NT, ND; BS present. PEG     EXTREMITIES:  2+ Peripheral Pulses, No clubbing, cyanosis, or edema    LYMPH: No lymphadenopathy noted    SKIN: B/L Heel pressure ulcers       **************************** LABS *******************************************************                          8.5    11.03 )-----------( 620      ( 10 May 2024 06:07 )             27.9     05-10    138  |  99  |  14  ----------------------------<  137<H>  4.5   |  27  |  0.5<L>    Ca    8.8      10 May 2024 06:07  Mg     2.2     05-10    TPro  5.8<L>  /  Alb  2.5<L>  /  TBili  <0.2  /  DBili  x   /  AST  13  /  ALT  20  /  AlkPhos  92  05-10      Urinalysis Basic - ( 10 May 2024 06:07 )    Color: x / Appearance: x / SG: x / pH: x  Gluc: 137 mg/dL / Ketone: x  / Bili: x / Urobili: x   Blood: x / Protein: x / Nitrite: x   Leuk Esterase: x / RBC: x / WBC x   Sq Epi: x / Non Sq Epi: x / Bacteria: x        Lactate Trend  05-09 @ 06:58 Lactate:2.2   05-08 @ 07:24 Lactate:2.1   05-07 @ 05:39 Lactate:1.5   05-06 @ 21:54 Lactate:1.5   05-06 @ 12:39 Lactate:2.7   05-06 @ 04:20 Lactate:3.0         CAPILLARY BLOOD GLUCOSE      POCT Blood Glucose.: 121 mg/dL (09 May 2024 21:25)          **************************Active Medications *******************************************  No Known Allergies      acetaminophen     Tablet .. 650 milliGRAM(s) Oral every 6 hours PRN  albuterol/ipratropium for Nebulization 3 milliLiter(s) Nebulizer every 6 hours  aluminum hydroxide/magnesium hydroxide/simethicone Suspension 30 milliLiter(s) Oral every 4 hours PRN  AQUAPHOR (petrolatum Ointment) 1 Application(s) Topical two times a day  artificial  tears Solution 1 Drop(s) Both EYES two times a day  calcium carbonate   1250 mG (OsCal) 1 Tablet(s) Oral daily  chlorhexidine 2% Cloths 1 Application(s) Topical daily  enoxaparin Injectable 30 milliGRAM(s) SubCutaneous every 24 hours  famotidine    Tablet 20 milliGRAM(s) Oral two times a day  gabapentin Solution 300 milliGRAM(s) Oral two times a day  hydrocortisone 1% Cream 1 Application(s) Topical daily  ibuprofen  Tablet. 400 milliGRAM(s) Oral every 6 hours PRN  levETIRAcetam  Solution 750 milliGRAM(s) Oral two times a day  melatonin 3 milliGRAM(s) Oral at bedtime PRN  meropenem  IVPB 1000 milliGRAM(s) IV Intermittent every 8 hours  midodrine 5 milliGRAM(s) Oral every 8 hours  ondansetron Injectable 4 milliGRAM(s) IV Push every 8 hours PRN  raloxifene 60 milliGRAM(s) Oral daily  scopolamine 1 mG/72 Hr(s) Patch 1 Patch Transdermal every 72 hours  senna 2 Tablet(s) Oral at bedtime  silver sulfADIAZINE 1% Cream 1 Application(s) Topical two times a day      ***************************************************  RADIOLOGY & ADDITIONAL TESTS:    Imaging Personally Reviewed:  [ ] YES  [ ] NO    HEALTH ISSUES - PROBLEM Dx:  Septic shock    Acute respiratory failure with hypoxia    Advanced care planning/counseling discussion    Encounter for palliative care

## 2024-05-10 NOTE — PROGRESS NOTE ADULT - MENTAL STATUS
no change
no change, opens eyes, non verbal, does not follow commands
no change
non verbal  non responsive
no change

## 2024-05-10 NOTE — PROGRESS NOTE ADULT - ASSESSMENT
59yo F w/ pmhx of Down's syndrome, cerebral palsy, seizure disorder presents from [facility] [place] for respiratory distress. Admitted to SDU for severe sepsis and acute hypoxemic respiratory failure likely secondary to recurrent CAP.     # Acute hypoxemic respiratory failure   #Severe sepsis with septic shock poa  #possible recurrent pneumonia /suspected aspiration pna ( despite having peg )   #chronic hypotension   - CXR: possible basilar opacities  - covid/RSV, MSRA, negative; Bl cx with contaminant   - C/w Midodrine 5mg  - target MAP 60 ( Patient always has BP on the softer side )   - blood cx 4/15: staph capitis  - blood cx 4/15 and 4/17: NGTD  - Scopolamine for secretions. aspiration precautions   - Palliative care following   - 4/22 and 4/23: spiked fever and WBC ;WBC downtrending (10 on 4/26)  - F/u bcx 4/23 -> no growth to date  - CT chest angio w/ contrast 4/24: bilateral opacities   - currently on 4L facemask  -  fungitell  >> nEG   -Blood cx >> NGTD 04/28  - 5/5-5/6:  overnight patient was tachy to 140s, hypotensive (around baseline), febrile to 38.5, given 1L LR bolus and then additional 500cc for elevated lactate of 3  - lactate improved overnight, tachy and febrile this am on 5/7/24, given 500cc bolus, CT showing asp pna,  - given 1x danna and vanco dose, will continue danna 1g q8  - bcx pending from this am pre-abx dose   - and bcx pending off abx from yesterday 5/5/24  - lactate improved to 1.5 overnight   - will reduce volume of diet bolus to 250 cc due to suspected gastric distention contributing to aspiration   - 5/8: last febrile to 101 at 5pm yesterday   bp stable, some improvement in tachycardia   reduced the volume of bolus feeds given high suspicion for aspiration from hiatal hernia   - 5/10: f/u GI regarding aspiration risk of patient's current G-tube vs possible J-tube placement?      #Episodes of Vomiting: resolved as of 5/6/24  - On PEG feed   - Aspiration risk ; C Xray done   - Can restart the PEG feeding for now     # B/l feet ulcers  - No abscess/cellulitis  - colonized with ESBL strain  - Off loading to prevent pressure sores   - wound care following     #seizure disorder, cont meds   #Down syndrome  #Nonverbal bedbound  # PEG tube   - c/w home meds    #Progress Note Handoff    Pending :  ID followup, followup bc and labs   Family discussion: will need meeting with group home admin prior to return   Disposition: group home   code status: dnr, dni . poor prognosis

## 2024-05-10 NOTE — PROGRESS NOTE ADULT - ASSESSMENT
57yo F w/ pmhx of Down's syndrome, cerebral palsy, seizure disorder presents from [facility] for respiratory distress. Admitted to SDU for severe sepsis and acute hypoxemic respiratory failure likely secondary to recurrent CAP.     # Acute hypoxemic respiratory failure   #Severe sepsis with septic shock poa  #possible recurrent pneumonia /suspected aspiration pna ( despite having peg )   #chronic hypotension   - covid/RSV, MSRA, negative; Bl cx with contaminant   - C/w Midodrine 5mg  - target MAP 60 ( Patient always has BP on the softer side )   - blood cx 4/15: staph capitis  - blood cx 4/15 and 4/17: NGTD  - Scopolamine for secretions. aspiration precautions   - Palliative care following   - 4/22 and 4/23: spiked fever and WBC ;WBC downtrending (10 on 4/26)  - F/u bcx 4/23 -> no growth to date  - CT chest angio w/ contrast 4/24: bilateral opacities   - currently on 3L NC   -  fungitell  >> nEG   -Blood cx >> NGTD 04/28  - 5/5-5/6:  overnight patient was tachy to 140s, hypotensive (around baseline), febrile to 38.5, given 1L LR bolus and then additional 500cc for elevated lactate of 3  - lactate improved overnight, tachy and febrile this am on 5/7/24, given 500cc bolus, CT showing asp pna,  - given 1x danna and vanco dose, will continue danna 1g q8   - will reduce volume of diet bolus to 250 cc due to suspected gastric distention contributing to aspiration   - 5/9: Tmax 100.5     5/10  Tmax 100.1  reduced the volume of bolus feeds given high suspicion for aspiration from hiatal hernia     #Episodes of Vomiting >> possibly complicated by Aspiration   - recurrent aspiration risk is high for coexisting hiatal hernia   - On PEG feed  >> ? consider J- tube    - will d/w GI team.     # B/l feet ulcers  - No abscess/cellulitis  - colonized with ESBL strain  - Off loading to prevent pressure sores   - wound care following     #seizure disorder, cont meds   #Down syndrome  #Nonverbal bedbound  # PEG tube   - c/w home meds    #Progress Note Handoff    Pending : Clinical improvement / Fever curve   Family discussion: will need meeting with group home admin prior to return   Disposition: group home

## 2024-05-11 LAB
ALBUMIN SERPL ELPH-MCNC: 2.6 G/DL — LOW (ref 3.5–5.2)
ALP SERPL-CCNC: 87 U/L — SIGNIFICANT CHANGE UP (ref 30–115)
ALT FLD-CCNC: 15 U/L — SIGNIFICANT CHANGE UP (ref 0–41)
ANION GAP SERPL CALC-SCNC: 11 MMOL/L — SIGNIFICANT CHANGE UP (ref 7–14)
AST SERPL-CCNC: 19 U/L — SIGNIFICANT CHANGE UP (ref 0–41)
BASOPHILS # BLD AUTO: 0.07 K/UL — SIGNIFICANT CHANGE UP (ref 0–0.2)
BASOPHILS NFR BLD AUTO: 0.8 % — SIGNIFICANT CHANGE UP (ref 0–1)
BILIRUB SERPL-MCNC: <0.2 MG/DL — SIGNIFICANT CHANGE UP (ref 0.2–1.2)
BUN SERPL-MCNC: 13 MG/DL — SIGNIFICANT CHANGE UP (ref 10–20)
CALCIUM SERPL-MCNC: 8.8 MG/DL — SIGNIFICANT CHANGE UP (ref 8.4–10.5)
CHLORIDE SERPL-SCNC: 98 MMOL/L — SIGNIFICANT CHANGE UP (ref 98–110)
CO2 SERPL-SCNC: 28 MMOL/L — SIGNIFICANT CHANGE UP (ref 17–32)
CREAT SERPL-MCNC: <0.5 MG/DL — LOW (ref 0.7–1.5)
CULTURE RESULTS: SIGNIFICANT CHANGE UP
CULTURE RESULTS: SIGNIFICANT CHANGE UP
EGFR: 115 ML/MIN/1.73M2 — SIGNIFICANT CHANGE UP
EOSINOPHIL # BLD AUTO: 0.29 K/UL — SIGNIFICANT CHANGE UP (ref 0–0.7)
EOSINOPHIL NFR BLD AUTO: 3.3 % — SIGNIFICANT CHANGE UP (ref 0–8)
GLUCOSE BLDC GLUCOMTR-MCNC: 105 MG/DL — HIGH (ref 70–99)
GLUCOSE BLDC GLUCOMTR-MCNC: 115 MG/DL — HIGH (ref 70–99)
GLUCOSE BLDC GLUCOMTR-MCNC: 123 MG/DL — HIGH (ref 70–99)
GLUCOSE BLDC GLUCOMTR-MCNC: 131 MG/DL — HIGH (ref 70–99)
GLUCOSE BLDC GLUCOMTR-MCNC: 145 MG/DL — HIGH (ref 70–99)
GLUCOSE BLDC GLUCOMTR-MCNC: 161 MG/DL — HIGH (ref 70–99)
GLUCOSE SERPL-MCNC: 124 MG/DL — HIGH (ref 70–99)
HCT VFR BLD CALC: 28 % — LOW (ref 37–47)
HGB BLD-MCNC: 8.5 G/DL — LOW (ref 12–16)
IMM GRANULOCYTES NFR BLD AUTO: 0.8 % — HIGH (ref 0.1–0.3)
LACTATE SERPL-SCNC: 1.7 MMOL/L — SIGNIFICANT CHANGE UP (ref 0.7–2)
LYMPHOCYTES # BLD AUTO: 1.52 K/UL — SIGNIFICANT CHANGE UP (ref 1.2–3.4)
LYMPHOCYTES # BLD AUTO: 17.2 % — LOW (ref 20.5–51.1)
MAGNESIUM SERPL-MCNC: 2.2 MG/DL — SIGNIFICANT CHANGE UP (ref 1.8–2.4)
MCHC RBC-ENTMCNC: 28.6 PG — SIGNIFICANT CHANGE UP (ref 27–31)
MCHC RBC-ENTMCNC: 30.4 G/DL — LOW (ref 32–37)
MCV RBC AUTO: 94.3 FL — SIGNIFICANT CHANGE UP (ref 81–99)
MONOCYTES # BLD AUTO: 0.41 K/UL — SIGNIFICANT CHANGE UP (ref 0.1–0.6)
MONOCYTES NFR BLD AUTO: 4.6 % — SIGNIFICANT CHANGE UP (ref 1.7–9.3)
NEUTROPHILS # BLD AUTO: 6.46 K/UL — SIGNIFICANT CHANGE UP (ref 1.4–6.5)
NEUTROPHILS NFR BLD AUTO: 73.3 % — SIGNIFICANT CHANGE UP (ref 42.2–75.2)
NRBC # BLD: 0 /100 WBCS — SIGNIFICANT CHANGE UP (ref 0–0)
PLATELET # BLD AUTO: 585 K/UL — HIGH (ref 130–400)
PMV BLD: 9.6 FL — SIGNIFICANT CHANGE UP (ref 7.4–10.4)
POTASSIUM SERPL-MCNC: 4.7 MMOL/L — SIGNIFICANT CHANGE UP (ref 3.5–5)
POTASSIUM SERPL-SCNC: 4.7 MMOL/L — SIGNIFICANT CHANGE UP (ref 3.5–5)
PROT SERPL-MCNC: 5.5 G/DL — LOW (ref 6–8)
RBC # BLD: 2.97 M/UL — LOW (ref 4.2–5.4)
RBC # FLD: 21.4 % — HIGH (ref 11.5–14.5)
SODIUM SERPL-SCNC: 137 MMOL/L — SIGNIFICANT CHANGE UP (ref 135–146)
SPECIMEN SOURCE: SIGNIFICANT CHANGE UP
SPECIMEN SOURCE: SIGNIFICANT CHANGE UP
WBC # BLD: 8.82 K/UL — SIGNIFICANT CHANGE UP (ref 4.8–10.8)
WBC # FLD AUTO: 8.82 K/UL — SIGNIFICANT CHANGE UP (ref 4.8–10.8)

## 2024-05-11 PROCEDURE — 99232 SBSQ HOSP IP/OBS MODERATE 35: CPT

## 2024-05-11 PROCEDURE — 93010 ELECTROCARDIOGRAM REPORT: CPT

## 2024-05-11 RX ADMIN — Medication 1 APPLICATION(S): at 06:33

## 2024-05-11 RX ADMIN — Medication 3 MILLILITER(S): at 08:36

## 2024-05-11 RX ADMIN — Medication 1 APPLICATION(S): at 18:07

## 2024-05-11 RX ADMIN — RALOXIFENE HYDROCHLORIDE 60 MILLIGRAM(S): 60 TABLET, COATED ORAL at 11:48

## 2024-05-11 RX ADMIN — Medication 3 MILLILITER(S): at 19:54

## 2024-05-11 RX ADMIN — GABAPENTIN 300 MILLIGRAM(S): 400 CAPSULE ORAL at 07:10

## 2024-05-11 RX ADMIN — Medication 1 DROP(S): at 06:33

## 2024-05-11 RX ADMIN — MEROPENEM 100 MILLIGRAM(S): 1 INJECTION INTRAVENOUS at 06:27

## 2024-05-11 RX ADMIN — Medication 1 TABLET(S): at 11:48

## 2024-05-11 RX ADMIN — LEVETIRACETAM 750 MILLIGRAM(S): 250 TABLET, FILM COATED ORAL at 06:28

## 2024-05-11 RX ADMIN — ENOXAPARIN SODIUM 30 MILLIGRAM(S): 100 INJECTION SUBCUTANEOUS at 13:52

## 2024-05-11 RX ADMIN — CHLORHEXIDINE GLUCONATE 1 APPLICATION(S): 213 SOLUTION TOPICAL at 11:48

## 2024-05-11 RX ADMIN — MIDODRINE HYDROCHLORIDE 5 MILLIGRAM(S): 2.5 TABLET ORAL at 07:47

## 2024-05-11 RX ADMIN — MEROPENEM 100 MILLIGRAM(S): 1 INJECTION INTRAVENOUS at 22:34

## 2024-05-11 RX ADMIN — MIDODRINE HYDROCHLORIDE 5 MILLIGRAM(S): 2.5 TABLET ORAL at 13:52

## 2024-05-11 RX ADMIN — SENNA PLUS 2 TABLET(S): 8.6 TABLET ORAL at 21:31

## 2024-05-11 RX ADMIN — MEROPENEM 100 MILLIGRAM(S): 1 INJECTION INTRAVENOUS at 16:47

## 2024-05-11 RX ADMIN — Medication 1 APPLICATION(S): at 18:08

## 2024-05-11 RX ADMIN — Medication 1 APPLICATION(S): at 11:48

## 2024-05-11 RX ADMIN — Medication 1 APPLICATION(S): at 06:30

## 2024-05-11 RX ADMIN — FAMOTIDINE 20 MILLIGRAM(S): 10 INJECTION INTRAVENOUS at 06:29

## 2024-05-11 RX ADMIN — SCOPALAMINE 1 PATCH: 1 PATCH, EXTENDED RELEASE TRANSDERMAL at 07:30

## 2024-05-11 RX ADMIN — Medication 3 MILLILITER(S): at 13:25

## 2024-05-11 RX ADMIN — LEVETIRACETAM 750 MILLIGRAM(S): 250 TABLET, FILM COATED ORAL at 18:07

## 2024-05-11 RX ADMIN — Medication 3 MILLILITER(S): at 02:43

## 2024-05-11 RX ADMIN — FAMOTIDINE 20 MILLIGRAM(S): 10 INJECTION INTRAVENOUS at 18:07

## 2024-05-11 RX ADMIN — GABAPENTIN 300 MILLIGRAM(S): 400 CAPSULE ORAL at 18:09

## 2024-05-11 RX ADMIN — MIDODRINE HYDROCHLORIDE 5 MILLIGRAM(S): 2.5 TABLET ORAL at 21:31

## 2024-05-11 RX ADMIN — Medication 1 DROP(S): at 18:08

## 2024-05-11 NOTE — PROGRESS NOTE ADULT - ASSESSMENT
59yo F w/ pmhx of Down's syndrome, cerebral palsy, seizure disorder presents from [facility] for respiratory distress. Admitted to SDU for severe sepsis and acute hypoxemic respiratory failure likely secondary to recurrent CAP.     # Acute hypoxemic respiratory failure   #Severe sepsis with septic shock poa  #possible recurrent pneumonia /suspected aspiration pna ( despite having peg )   #chronic hypotension   - covid/RSV, MSRA, negative; Bl cx with contaminant   - C/w Midodrine 5mg  - target MAP 60 ( Patient always has BP on the softer side )   - blood cx 4/15: staph capitis  - blood cx 4/15 and 4/17: NGTD  - Scopolamine for secretions. aspiration precautions   - Palliative care following   - 4/22 and 4/23: spiked fever and WBC ;WBC downtrending (10 on 4/26)  - F/u bcx 4/23 -> no growth to date  - CT chest angio w/ contrast 4/24: bilateral opacities   - currently on 3L NC   -  fungitell  >> nEG   -Blood cx >> NGTD 04/28  - 5/5-5/6:  overnight patient was tachy to 140s, hypotensive (around baseline), febrile to 38.5, given 1L LR bolus and then additional 500cc for elevated lactate of 3  - lactate improved overnight, tachy and febrile this am on 5/7/24, given 500cc bolus, CT showing asp pna,  - given 1x danna and vanco dose, will continue danna 1g q8   - will reduce volume of diet bolus to 250 cc due to suspected gastric distention contributing to aspiration   - 5/9: Tmax 100.5     5/10  Tmax 100.1 5/11 5/11  Tmax 99  - Trending down WBC   22 >> 16 >> 14 >> 8K today.   reduced the volume of bolus feeds given high suspicion for aspiration from hiatal hernia     #Episodes of Vomiting >> possibly complicated by Aspiration   - recurrent aspiration risk is high for coexisting hiatal hernia   - On PEG feed  >> ? consider J- tube, will f/u gastric emptying study   - will d/w GI team.     # B/l feet ulcers  - No abscess/cellulitis  - colonized with ESBL strain  - Off loading to prevent pressure sores   - wound care following     #seizure disorder, cont meds   #Down syndrome  #Nonverbal bedbound  # PEG tube   - c/w home meds    #Progress Note Handoff    Pending : IV Abx/ Fever curve /? Aspiration   Family discussion: will need meeting with group home admin prior to return   Disposition: group home

## 2024-05-11 NOTE — PROGRESS NOTE ADULT - SUBJECTIVE AND OBJECTIVE BOX
TEA ECHAVARRIA  58y  Female      Patient is a 58y old  Female who presents with a chief complaint of Pneumonia.      INTERVAL HPI/OVERNIGHT EVENTS:      ******************************* REVIEW OF SYSTEMS:**********************************************    All other review of systems negative    *********************** VITALS ******************************************    T(F): 97.9 (05-10-24 @ 22:26)  HR: 107 (05-10-24 @ 22:26) (86 - 110)  BP: 91/63 (05-10-24 @ 22:26) (91/63 - 101/59)  RR: 18 (05-10-24 @ 22:26) (18 - 18)  SpO2: 97% (05-11-24 @ 07:40) (97% - 98%)    05-10-24 @ 07:01  -  05-11-24 @ 07:00  --------------------------------------------------------  IN: 900 mL / OUT: 0 mL / NET: 900 mL            05-10-24 @ 07:01  -  05-11-24 @ 07:00  --------------------------------------------------------  IN: 900 mL / OUT: 0 mL / NET: 900 mL        ******************************** PHYSICAL EXAM:**************************************************  GENERAL: NAD    PSYCH: no agitation,   HEENT:     NERVOUS SYSTEM:  Alert & awake x 1-2     PULMONARY: MARIE, CTA    CARDIOVASCULAR: S1S2 RRR    GI: Soft, NT, ND; BS present. PEG     EXTREMITIES:  2+ Peripheral Pulses, No clubbing, cyanosis, or edema    LYMPH: No lymphadenopathy noted    SKIN: No rashes or lesions      **************************** LABS *******************************************************                          8.5    8.82  )-----------( 585      ( 11 May 2024 07:13 )             28.0     05-11    137  |  98  |  13  ----------------------------<  124<H>  4.7   |  28  |  <0.5<L>    Ca    8.8      11 May 2024 07:13  Mg     2.2     05-11    TPro  5.5<L>  /  Alb  2.6<L>  /  TBili  <0.2  /  DBili  x   /  AST  19  /  ALT  15  /  AlkPhos  87  05-11      Urinalysis Basic - ( 11 May 2024 07:13 )    Color: x / Appearance: x / SG: x / pH: x  Gluc: 124 mg/dL / Ketone: x  / Bili: x / Urobili: x   Blood: x / Protein: x / Nitrite: x   Leuk Esterase: x / RBC: x / WBC x   Sq Epi: x / Non Sq Epi: x / Bacteria: x        Lactate Trend  05-11 @ 07:13 Lactate:1.7   05-09 @ 06:58 Lactate:2.2   05-08 @ 07:24 Lactate:2.1   05-07 @ 05:39 Lactate:1.5   05-06 @ 21:54 Lactate:1.5         CAPILLARY BLOOD GLUCOSE      POCT Blood Glucose.: 161 mg/dL (11 May 2024 08:02)          **************************Active Medications *******************************************  No Known Allergies      acetaminophen     Tablet .. 650 milliGRAM(s) Oral every 6 hours PRN  albuterol/ipratropium for Nebulization 3 milliLiter(s) Nebulizer every 6 hours  aluminum hydroxide/magnesium hydroxide/simethicone Suspension 30 milliLiter(s) Oral every 4 hours PRN  AQUAPHOR (petrolatum Ointment) 1 Application(s) Topical two times a day  artificial  tears Solution 1 Drop(s) Both EYES two times a day  calcium carbonate   1250 mG (OsCal) 1 Tablet(s) Oral daily  chlorhexidine 2% Cloths 1 Application(s) Topical daily  enoxaparin Injectable 30 milliGRAM(s) SubCutaneous every 24 hours  famotidine    Tablet 20 milliGRAM(s) Oral two times a day  gabapentin Solution 300 milliGRAM(s) Oral two times a day  hydrocortisone 1% Cream 1 Application(s) Topical daily  ibuprofen  Tablet. 400 milliGRAM(s) Oral every 6 hours PRN  levETIRAcetam  Solution 750 milliGRAM(s) Oral two times a day  melatonin 3 milliGRAM(s) Oral at bedtime PRN  meropenem  IVPB 1000 milliGRAM(s) IV Intermittent every 8 hours  midodrine 5 milliGRAM(s) Oral every 8 hours  ondansetron Injectable 4 milliGRAM(s) IV Push every 8 hours PRN  raloxifene 60 milliGRAM(s) Oral daily  scopolamine 1 mG/72 Hr(s) Patch 1 Patch Transdermal every 72 hours  senna 2 Tablet(s) Oral at bedtime  silver sulfADIAZINE 1% Cream 1 Application(s) Topical two times a day      ***************************************************  RADIOLOGY & ADDITIONAL TESTS:    Imaging Personally Reviewed:  [ ] YES  [ ] NO    HEALTH ISSUES - PROBLEM Dx:  Septic shock    Acute respiratory failure with hypoxia    Advanced care planning/counseling discussion    Encounter for palliative care

## 2024-05-11 NOTE — PROGRESS NOTE ADULT - ASSESSMENT
59yo F w/ pmhx of Down's syndrome, cerebral palsy, seizure disorder presents from [facility] for respiratory distress. Admitted to SDU for severe sepsis and acute hypoxemic respiratory failure likely secondary to recurrent CAP.     # Acute hypoxemic respiratory failure   #Severe sepsis with septic shock poa  #possible recurrent pneumonia /suspected aspiration pna ( despite having peg )   #chronic hypotension   - CXR: possible basilar opacities  - covid/RSV, MSRA, negative; Bl cx with contaminant   - C/w Midodrine 5mg  - target MAP 60 ( Patient always has BP on the softer side )   - blood cx 4/15: staph capitis  - blood cx 4/15 and 4/17: NGTD  - Scopolamine for secretions. aspiration precautions   - Palliative care following   - 4/22 and 4/23: spiked fever and WBC ;WBC downtrending (10 on 4/26)  - F/u bcx 4/23 -> no growth to date  - CT chest angio w/ contrast 4/24: bilateral opacities   - currently on 4L facemask  -  fungitell  >> nEG   -Blood cx >> NGTD 04/28  - 5/5-5/6:  overnight patient was tachy to 140s, hypotensive (around baseline), febrile to 38.5, given 1L LR bolus and then additional 500cc for elevated lactate of 3  - lactate improved overnight, tachy and febrile this am on 5/7/24, given 500cc bolus, CT showing asp pna,  - given 1x danna and vanco dose, will continue danna 1g q8  - bcx pending from this am pre-abx dose   - and bcx pending off abx from yesterday 5/5/24  - lactate improved to 1.5 overnight   - will reduce volume of diet bolus to 250 cc due to suspected gastric distention contributing to aspiration   - 5/8: last febrile to 101 at 5pm yesterday   bp stable, some improvement in tachycardia   reduced the volume of bolus feeds given high suspicion for aspiration from hiatal hernia   - 5/10: f/u GI regarding aspiration risk of patient's current G-tube vs possible J-tube placement?  - 5/11: improved tachy. wbc, and bp, monitor on abx for now       #Episodes of Vomiting: resolved as of 5/6/24  - On PEG feed   - Aspiration risk ; C Xray done   - Can restart the PEG feeding for now     # B/l feet ulcers  - No abscess/cellulitis  - colonized with ESBL strain  - Off loading to prevent pressure sores   - wound care following     #seizure disorder, cont meds   #Down syndrome  #Nonverbal bedbound  # PEG tube   - c/w home meds    #Progress Note Handoff    Pending :  ID followup, followup bc and labs   Family discussion: will need meeting with group home admin prior to return   Disposition: group home   code status: dnr, dni . poor prognosis

## 2024-05-11 NOTE — PROGRESS NOTE ADULT - SUBJECTIVE AND OBJECTIVE BOX
24H events:    Patient is a 58y old Female who presents with a chief complaint of Pneumonia (10 May 2024 14:16)    Primary diagnosis of Acute sepsis    Today is hospital day 26d. This morning patient was seen and examined at bedside, resting comfortably in bed.    No acute or major events overnight. Hemodynamically stable, tolerating oral diet, voiding appropriately with appropriate bowel movements.     Code Status:    Family communication:  Contact date:  Name of person contacted:  Relationship to patient:  Communication details:  What matters most:    PAST MEDICAL & SURGICAL HISTORY  Down syndrome    Osteoporosis    Mild anemia    Neuropathy    S/P debridement  of R hip on 3/2/21      SOCIAL HISTORY:  Social History:      ALLERGIES:  No Known Allergies    MEDICATIONS:  STANDING MEDICATIONS  albuterol/ipratropium for Nebulization 3 milliLiter(s) Nebulizer every 6 hours  AQUAPHOR (petrolatum Ointment) 1 Application(s) Topical two times a day  artificial  tears Solution 1 Drop(s) Both EYES two times a day  calcium carbonate   1250 mG (OsCal) 1 Tablet(s) Oral daily  chlorhexidine 2% Cloths 1 Application(s) Topical daily  enoxaparin Injectable 30 milliGRAM(s) SubCutaneous every 24 hours  famotidine    Tablet 20 milliGRAM(s) Oral two times a day  gabapentin Solution 300 milliGRAM(s) Oral two times a day  hydrocortisone 1% Cream 1 Application(s) Topical daily  levETIRAcetam  Solution 750 milliGRAM(s) Oral two times a day  meropenem  IVPB 1000 milliGRAM(s) IV Intermittent every 8 hours  midodrine 5 milliGRAM(s) Oral every 8 hours  raloxifene 60 milliGRAM(s) Oral daily  scopolamine 1 mG/72 Hr(s) Patch 1 Patch Transdermal every 72 hours  senna 2 Tablet(s) Oral at bedtime  silver sulfADIAZINE 1% Cream 1 Application(s) Topical two times a day    PRN MEDICATIONS  acetaminophen     Tablet .. 650 milliGRAM(s) Oral every 6 hours PRN  aluminum hydroxide/magnesium hydroxide/simethicone Suspension 30 milliLiter(s) Oral every 4 hours PRN  ibuprofen  Tablet. 400 milliGRAM(s) Oral every 6 hours PRN  melatonin 3 milliGRAM(s) Oral at bedtime PRN  ondansetron Injectable 4 milliGRAM(s) IV Push every 8 hours PRN    VITALS:   T(F): 97.9  HR: 107  BP: 91/63  RR: 18  SpO2: 97%    PHYSICAL EXAM:    GENERAL: ill apprearing, non verbal  HEAD: NCAD, no hematoma or laceration   NECK: Supple, no jvd  HEART: tachy, regular rhythm , no murmur   LUNGS: decrease bs at right base, coarse rhonchi upper airway  ABDOMEN:  soft, non-tender, peg-tube present  EXTREMITIES: b/l UE and LE contracted   NERVOUS SYSTEM: unable to follow commands         (  ) Indwelling Madsen Catheter:   Date insterted:    Reason (  ) Critical illness     (  ) urinary retention    (  ) Accurate Ins/Outs Monitoring     (  ) CMO patient    (  ) Central Line:   Date inserted:  Location: (  ) Right IJ     (  ) Left IJ     (  ) Right Fem     (  ) Left Fem    (  ) SPC        (  ) pigtail       (  ) PEG tube       (  ) colostomy       (  ) jejunostomy  (  ) U-Dall    LABS:                        8.5    8.82  )-----------( 585      ( 11 May 2024 07:13 )             28.0     05-11    137  |  98  |  13  ----------------------------<  124<H>  4.7   |  28  |  <0.5<L>    Ca    8.8      11 May 2024 07:13  Mg     2.2     05-11    TPro  5.5<L>  /  Alb  2.6<L>  /  TBili  <0.2  /  DBili  x   /  AST  19  /  ALT  15  /  AlkPhos  87  05-11      Urinalysis Basic - ( 11 May 2024 07:13 )    Color: x / Appearance: x / SG: x / pH: x  Gluc: 124 mg/dL / Ketone: x  / Bili: x / Urobili: x   Blood: x / Protein: x / Nitrite: x   Leuk Esterase: x / RBC: x / WBC x   Sq Epi: x / Non Sq Epi: x / Bacteria: x        Lactate, Blood: 1.7 mmol/L (05-11-24 @ 07:13)          RADIOLOGY:    RADIOLOGY

## 2024-05-12 LAB
GLUCOSE BLDC GLUCOMTR-MCNC: 112 MG/DL — HIGH (ref 70–99)
GLUCOSE BLDC GLUCOMTR-MCNC: 118 MG/DL — HIGH (ref 70–99)
GLUCOSE BLDC GLUCOMTR-MCNC: 96 MG/DL — SIGNIFICANT CHANGE UP (ref 70–99)

## 2024-05-12 PROCEDURE — 99232 SBSQ HOSP IP/OBS MODERATE 35: CPT

## 2024-05-12 RX ADMIN — Medication 1 APPLICATION(S): at 06:10

## 2024-05-12 RX ADMIN — Medication 1 DROP(S): at 06:09

## 2024-05-12 RX ADMIN — Medication 3 MILLILITER(S): at 13:53

## 2024-05-12 RX ADMIN — LEVETIRACETAM 750 MILLIGRAM(S): 250 TABLET, FILM COATED ORAL at 17:15

## 2024-05-12 RX ADMIN — Medication 1 DROP(S): at 17:16

## 2024-05-12 RX ADMIN — FAMOTIDINE 20 MILLIGRAM(S): 10 INJECTION INTRAVENOUS at 06:09

## 2024-05-12 RX ADMIN — MEROPENEM 100 MILLIGRAM(S): 1 INJECTION INTRAVENOUS at 06:10

## 2024-05-12 RX ADMIN — FAMOTIDINE 20 MILLIGRAM(S): 10 INJECTION INTRAVENOUS at 17:15

## 2024-05-12 RX ADMIN — SCOPALAMINE 1 PATCH: 1 PATCH, EXTENDED RELEASE TRANSDERMAL at 06:14

## 2024-05-12 RX ADMIN — Medication 1 APPLICATION(S): at 17:16

## 2024-05-12 RX ADMIN — MIDODRINE HYDROCHLORIDE 5 MILLIGRAM(S): 2.5 TABLET ORAL at 06:11

## 2024-05-12 RX ADMIN — MIDODRINE HYDROCHLORIDE 5 MILLIGRAM(S): 2.5 TABLET ORAL at 21:48

## 2024-05-12 RX ADMIN — Medication 1 APPLICATION(S): at 06:09

## 2024-05-12 RX ADMIN — ENOXAPARIN SODIUM 30 MILLIGRAM(S): 100 INJECTION SUBCUTANEOUS at 13:16

## 2024-05-12 RX ADMIN — SCOPALAMINE 1 PATCH: 1 PATCH, EXTENDED RELEASE TRANSDERMAL at 21:05

## 2024-05-12 RX ADMIN — MEROPENEM 100 MILLIGRAM(S): 1 INJECTION INTRAVENOUS at 23:01

## 2024-05-12 RX ADMIN — Medication 1 APPLICATION(S): at 17:17

## 2024-05-12 RX ADMIN — GABAPENTIN 300 MILLIGRAM(S): 400 CAPSULE ORAL at 18:10

## 2024-05-12 RX ADMIN — SENNA PLUS 2 TABLET(S): 8.6 TABLET ORAL at 21:48

## 2024-05-12 RX ADMIN — RALOXIFENE HYDROCHLORIDE 60 MILLIGRAM(S): 60 TABLET, COATED ORAL at 11:59

## 2024-05-12 RX ADMIN — MIDODRINE HYDROCHLORIDE 5 MILLIGRAM(S): 2.5 TABLET ORAL at 13:16

## 2024-05-12 RX ADMIN — LEVETIRACETAM 750 MILLIGRAM(S): 250 TABLET, FILM COATED ORAL at 06:08

## 2024-05-12 RX ADMIN — MEROPENEM 100 MILLIGRAM(S): 1 INJECTION INTRAVENOUS at 15:15

## 2024-05-12 RX ADMIN — SCOPALAMINE 1 PATCH: 1 PATCH, EXTENDED RELEASE TRANSDERMAL at 07:06

## 2024-05-12 RX ADMIN — Medication 3 MILLILITER(S): at 07:50

## 2024-05-12 RX ADMIN — Medication 3 MILLILITER(S): at 19:44

## 2024-05-12 RX ADMIN — Medication 1 APPLICATION(S): at 11:59

## 2024-05-12 RX ADMIN — SCOPALAMINE 1 PATCH: 1 PATCH, EXTENDED RELEASE TRANSDERMAL at 07:40

## 2024-05-12 RX ADMIN — CHLORHEXIDINE GLUCONATE 1 APPLICATION(S): 213 SOLUTION TOPICAL at 11:59

## 2024-05-12 RX ADMIN — GABAPENTIN 300 MILLIGRAM(S): 400 CAPSULE ORAL at 06:08

## 2024-05-12 NOTE — PROGRESS NOTE ADULT - ASSESSMENT
57yo F w/ pmhx of Down's syndrome, cerebral palsy, seizure disorder presents from [facility] for respiratory distress. Admitted to SDU for severe sepsis and acute hypoxemic respiratory failure likely secondary to recurrent CAP.     # Acute hypoxemic respiratory failure   #Severe sepsis with septic shock poa  #possible recurrent pneumonia /suspected aspiration pna ( despite having peg )   #chronic hypotension   - covid/RSV, MSRA, negative; Bl cx with contaminant   - C/w Midodrine 5mg  - target MAP 60 ( Patient always has BP on the softer side )   - blood cx 4/15: staph capitis  - blood cx 4/15 and 4/17: NGTD  - Scopolamine for secretions. aspiration precautions   - Palliative care following   - 4/22 and 4/23: spiked fever and WBC ;WBC downtrending (10 on 4/26)  - F/u bcx 4/23 -> no growth to date  - CT chest angio w/ contrast 4/24: bilateral opacities   - currently on 3L NC   -  fungitell  >> nEG   -Blood cx >> NGTD 04/28  - 5/5-5/6:  overnight patient was tachy to 140s, hypotensive (around baseline), febrile to 38.5, given 1L LR bolus and then additional 500cc for elevated lactate of 3  - lactate improved overnight, tachy and febrile this am on 5/7/24, given 500cc bolus, CT showing asp pna,  - given 1x danna and vanco dose, will continue danna 1g q8   - will reduce volume of diet bolus to 250 cc due to suspected gastric distention contributing to aspiration   - 5/9: Tmax 100.5     5/10  Tmax 100.1 5/11 5/11  Tmax 99    5/12  Tmax 98  - Trending down WBC   22 >> 16 >> 14 >> 8K today.   reduced the volume of bolus feeds given high suspicion for aspiration from hiatal hernia     #Episodes of Vomiting >> possibly complicated by Aspiration   - recurrent aspiration risk is high for coexisting hiatal hernia   - On PEG feed  >> ? consider J- tube, will f/u gastric emptying study   - will d/w GI team.     # B/l feet ulcers  - No abscess/cellulitis  - colonized with ESBL strain  - Off loading to prevent pressure sores   - wound care following     #seizure disorder, cont meds   #Down syndrome  #Nonverbal bedbound  # PEG tube   - c/w home meds    #Progress Note Handoff    Pending : IV Abx/  / NM Gastric emptying    Family discussion: will need meeting with group home admin prior to return   Disposition: group home

## 2024-05-12 NOTE — PROGRESS NOTE ADULT - SUBJECTIVE AND OBJECTIVE BOX
TEA ECHAVARRIA  58y  Female      Patient is a 58y old  Female who presents with a chief complaint of Pneumonia.       INTERVAL HPI/OVERNIGHT EVENTS:      ******************************* REVIEW OF SYSTEMS:**********************************************    All other review of systems negative    *********************** VITALS ******************************************    T(F): 97.5 (05-12-24 @ 04:40)  HR: 95 (05-12-24 @ 04:40) (60 - 95)  BP: 101/68 (05-12-24 @ 04:40) (64/51 - 101/68)  RR: 18 (05-12-24 @ 04:40) (18 - 18)  SpO2: 100% (05-12-24 @ 07:40) (92% - 100%)    05-11-24 @ 07:01  -  05-12-24 @ 07:00  --------------------------------------------------------  IN: 900 mL / OUT: 0 mL / NET: 900 mL    05-12-24 @ 07:01  -  05-12-24 @ 11:58  --------------------------------------------------------  IN: 0 mL / OUT: 500 mL / NET: -500 mL            05-11-24 @ 07:01  -  05-12-24 @ 07:00  --------------------------------------------------------  IN: 900 mL / OUT: 0 mL / NET: 900 mL    05-12-24 @ 07:01  -  05-12-24 @ 11:58  --------------------------------------------------------  IN: 0 mL / OUT: 500 mL / NET: -500 mL        ******************************** PHYSICAL EXAM:**************************************************  GENERAL: NAD    PSYCH: no agitation, baseline mentation  HEENT:     NERVOUS SYSTEM:  Alert & Oriented X3,  PULMONARY: MARIE, CTA    CARDIOVASCULAR: S1S2 RRR    GI: Soft, NT, ND; BS present.    EXTREMITIES:  2+ Peripheral Pulses, No clubbing, cyanosis, or edema    LYMPH: No lymphadenopathy noted    SKIN: No rashes or lesions      **************************** LABS *******************************************************                          8.5    8.82  )-----------( 585      ( 11 May 2024 07:13 )             28.0     05-11    137  |  98  |  13  ----------------------------<  124<H>  4.7   |  28  |  <0.5<L>    Ca    8.8      11 May 2024 07:13  Mg     2.2     05-11    TPro  5.5<L>  /  Alb  2.6<L>  /  TBili  <0.2  /  DBili  x   /  AST  19  /  ALT  15  /  AlkPhos  87  05-11      Urinalysis Basic - ( 11 May 2024 07:13 )    Color: x / Appearance: x / SG: x / pH: x  Gluc: 124 mg/dL / Ketone: x  / Bili: x / Urobili: x   Blood: x / Protein: x / Nitrite: x   Leuk Esterase: x / RBC: x / WBC x   Sq Epi: x / Non Sq Epi: x / Bacteria: x        Lactate Trend  05-11 @ 07:13 Lactate:1.7   05-09 @ 06:58 Lactate:2.2   05-08 @ 07:24 Lactate:2.1         CAPILLARY BLOOD GLUCOSE      POCT Blood Glucose.: 115 mg/dL (11 May 2024 21:17)          **************************Active Medications *******************************************  No Known Allergies      acetaminophen     Tablet .. 650 milliGRAM(s) Oral every 6 hours PRN  albuterol/ipratropium for Nebulization 3 milliLiter(s) Nebulizer every 6 hours  aluminum hydroxide/magnesium hydroxide/simethicone Suspension 30 milliLiter(s) Oral every 4 hours PRN  AQUAPHOR (petrolatum Ointment) 1 Application(s) Topical two times a day  artificial  tears Solution 1 Drop(s) Both EYES two times a day  calcium carbonate   1250 mG (OsCal) 1 Tablet(s) Oral daily  chlorhexidine 2% Cloths 1 Application(s) Topical daily  enoxaparin Injectable 30 milliGRAM(s) SubCutaneous every 24 hours  famotidine    Tablet 20 milliGRAM(s) Oral two times a day  gabapentin Solution 300 milliGRAM(s) Oral two times a day  hydrocortisone 1% Cream 1 Application(s) Topical daily  ibuprofen  Tablet. 400 milliGRAM(s) Oral every 6 hours PRN  levETIRAcetam  Solution 750 milliGRAM(s) Oral two times a day  melatonin 3 milliGRAM(s) Oral at bedtime PRN  meropenem  IVPB 1000 milliGRAM(s) IV Intermittent every 8 hours  midodrine 5 milliGRAM(s) Oral every 8 hours  ondansetron Injectable 4 milliGRAM(s) IV Push every 8 hours PRN  raloxifene 60 milliGRAM(s) Oral daily  scopolamine 1 mG/72 Hr(s) Patch 1 Patch Transdermal every 72 hours  senna 2 Tablet(s) Oral at bedtime  silver sulfADIAZINE 1% Cream 1 Application(s) Topical two times a day      ***************************************************  RADIOLOGY & ADDITIONAL TESTS:    Imaging Personally Reviewed:  [ ] YES  [ ] NO    HEALTH ISSUES - PROBLEM Dx:  Septic shock    Acute respiratory failure with hypoxia    Advanced care planning/counseling discussion    Encounter for palliative care

## 2024-05-13 LAB
BASOPHILS # BLD AUTO: 0.07 K/UL — SIGNIFICANT CHANGE UP (ref 0–0.2)
BASOPHILS NFR BLD AUTO: 0.7 % — SIGNIFICANT CHANGE UP (ref 0–1)
EOSINOPHIL # BLD AUTO: 0.27 K/UL — SIGNIFICANT CHANGE UP (ref 0–0.7)
EOSINOPHIL NFR BLD AUTO: 2.7 % — SIGNIFICANT CHANGE UP (ref 0–8)
GLUCOSE BLDC GLUCOMTR-MCNC: 112 MG/DL — HIGH (ref 70–99)
GLUCOSE BLDC GLUCOMTR-MCNC: 115 MG/DL — HIGH (ref 70–99)
GLUCOSE BLDC GLUCOMTR-MCNC: 116 MG/DL — HIGH (ref 70–99)
GLUCOSE BLDC GLUCOMTR-MCNC: 124 MG/DL — HIGH (ref 70–99)
GLUCOSE BLDC GLUCOMTR-MCNC: 146 MG/DL — HIGH (ref 70–99)
HCT VFR BLD CALC: 32 % — LOW (ref 37–47)
HGB BLD-MCNC: 9.7 G/DL — LOW (ref 12–16)
IMM GRANULOCYTES NFR BLD AUTO: 0.6 % — HIGH (ref 0.1–0.3)
LYMPHOCYTES # BLD AUTO: 1.73 K/UL — SIGNIFICANT CHANGE UP (ref 1.2–3.4)
LYMPHOCYTES # BLD AUTO: 17.4 % — LOW (ref 20.5–51.1)
MCHC RBC-ENTMCNC: 28.8 PG — SIGNIFICANT CHANGE UP (ref 27–31)
MCHC RBC-ENTMCNC: 30.3 G/DL — LOW (ref 32–37)
MCV RBC AUTO: 95 FL — SIGNIFICANT CHANGE UP (ref 81–99)
MONOCYTES # BLD AUTO: 0.59 K/UL — SIGNIFICANT CHANGE UP (ref 0.1–0.6)
MONOCYTES NFR BLD AUTO: 5.9 % — SIGNIFICANT CHANGE UP (ref 1.7–9.3)
NEUTROPHILS # BLD AUTO: 7.23 K/UL — HIGH (ref 1.4–6.5)
NEUTROPHILS NFR BLD AUTO: 72.7 % — SIGNIFICANT CHANGE UP (ref 42.2–75.2)
NRBC # BLD: 0 /100 WBCS — SIGNIFICANT CHANGE UP (ref 0–0)
PLATELET # BLD AUTO: 646 K/UL — HIGH (ref 130–400)
PMV BLD: 9.6 FL — SIGNIFICANT CHANGE UP (ref 7.4–10.4)
RBC # BLD: 3.37 M/UL — LOW (ref 4.2–5.4)
RBC # FLD: 21.7 % — HIGH (ref 11.5–14.5)
WBC # BLD: 9.95 K/UL — SIGNIFICANT CHANGE UP (ref 4.8–10.8)
WBC # FLD AUTO: 9.95 K/UL — SIGNIFICANT CHANGE UP (ref 4.8–10.8)

## 2024-05-13 PROCEDURE — 99232 SBSQ HOSP IP/OBS MODERATE 35: CPT

## 2024-05-13 RX ORDER — SODIUM CHLORIDE 9 MG/ML
250 INJECTION, SOLUTION INTRAVENOUS ONCE
Refills: 0 | Status: COMPLETED | OUTPATIENT
Start: 2024-05-13 | End: 2024-05-13

## 2024-05-13 RX ADMIN — GABAPENTIN 300 MILLIGRAM(S): 400 CAPSULE ORAL at 06:30

## 2024-05-13 RX ADMIN — MEROPENEM 100 MILLIGRAM(S): 1 INJECTION INTRAVENOUS at 06:22

## 2024-05-13 RX ADMIN — Medication 3 MILLILITER(S): at 08:15

## 2024-05-13 RX ADMIN — GABAPENTIN 300 MILLIGRAM(S): 400 CAPSULE ORAL at 18:02

## 2024-05-13 RX ADMIN — FAMOTIDINE 20 MILLIGRAM(S): 10 INJECTION INTRAVENOUS at 18:02

## 2024-05-13 RX ADMIN — Medication 3 MILLILITER(S): at 19:52

## 2024-05-13 RX ADMIN — LEVETIRACETAM 750 MILLIGRAM(S): 250 TABLET, FILM COATED ORAL at 18:02

## 2024-05-13 RX ADMIN — LEVETIRACETAM 750 MILLIGRAM(S): 250 TABLET, FILM COATED ORAL at 06:46

## 2024-05-13 RX ADMIN — Medication 1 APPLICATION(S): at 17:27

## 2024-05-13 RX ADMIN — Medication 1 APPLICATION(S): at 17:26

## 2024-05-13 RX ADMIN — Medication 3 MILLILITER(S): at 14:20

## 2024-05-13 RX ADMIN — SCOPALAMINE 1 PATCH: 1 PATCH, EXTENDED RELEASE TRANSDERMAL at 06:49

## 2024-05-13 RX ADMIN — MIDODRINE HYDROCHLORIDE 5 MILLIGRAM(S): 2.5 TABLET ORAL at 21:19

## 2024-05-13 RX ADMIN — CHLORHEXIDINE GLUCONATE 1 APPLICATION(S): 213 SOLUTION TOPICAL at 11:21

## 2024-05-13 RX ADMIN — SENNA PLUS 2 TABLET(S): 8.6 TABLET ORAL at 21:20

## 2024-05-13 RX ADMIN — Medication 1 DROP(S): at 06:23

## 2024-05-13 RX ADMIN — Medication 1 APPLICATION(S): at 06:22

## 2024-05-13 RX ADMIN — Medication 1 TABLET(S): at 11:20

## 2024-05-13 RX ADMIN — MIDODRINE HYDROCHLORIDE 5 MILLIGRAM(S): 2.5 TABLET ORAL at 13:15

## 2024-05-13 RX ADMIN — MIDODRINE HYDROCHLORIDE 5 MILLIGRAM(S): 2.5 TABLET ORAL at 06:22

## 2024-05-13 RX ADMIN — Medication 1 APPLICATION(S): at 11:20

## 2024-05-13 RX ADMIN — ENOXAPARIN SODIUM 30 MILLIGRAM(S): 100 INJECTION SUBCUTANEOUS at 13:15

## 2024-05-13 RX ADMIN — Medication 1 DROP(S): at 17:26

## 2024-05-13 RX ADMIN — FAMOTIDINE 20 MILLIGRAM(S): 10 INJECTION INTRAVENOUS at 06:22

## 2024-05-13 RX ADMIN — Medication 1 APPLICATION(S): at 06:23

## 2024-05-13 RX ADMIN — SODIUM CHLORIDE 500 MILLILITER(S): 9 INJECTION, SOLUTION INTRAVENOUS at 06:57

## 2024-05-13 RX ADMIN — RALOXIFENE HYDROCHLORIDE 60 MILLIGRAM(S): 60 TABLET, COATED ORAL at 11:20

## 2024-05-13 NOTE — PROGRESS NOTE ADULT - ASSESSMENT
57yo F w/ pmhx of Down's syndrome, cerebral palsy, seizure disorder presents from [facility] for respiratory distress. Admitted to SDU for severe sepsis and acute hypoxemic respiratory failure likely secondary to recurrent CAP.     # Acute hypoxemic respiratory failure   #Severe sepsis with septic shock poa  #possible recurrent pneumonia /suspected aspiration pna ( despite having peg )   #chronic hypotension   - CXR: possible basilar opacities  - covid/RSV, MSRA, negative; Bl cx with contaminant   - C/w Midodrine 5mg  - target MAP 60 ( Patient always has BP on the softer side )   - blood cx 4/15: staph capitis  - blood cx 4/15 and 4/17: NGTD  - Scopolamine for secretions. aspiration precautions   - 4/22 and 4/23: spiked fever and WBC ;WBC downtrending (10 on 4/26)  - F/u bcx 4/23 -> no growth to date  - CT chest angio w/ contrast 4/24: bilateral opacities   - currently on 4L facemask  -  fungitell  >> nEG   - Blood cx >> NGTD 04/28  - Pt is afebrile and wbc count stabilised now.   - given 1x danna and vanco dose, will continue danna 1g q8 --> will monitor off abx for now  - will reduce volume of diet bolus to 250 cc due to suspected gastric distention contributing to aspiration   bp stable, some improvement in tachycardia   reduced the volume of bolus feeds given high suspicion for aspiration from hiatal hernia   - 5/10: f/u GI regarding aspiration risk of patient's current G-tube vs possible J-tube placement?  - 5/11: improved tachy. wbc, and bp, monitor on abx for now   - 5/12: afebrile   - 5/13: afebrile, off abx for now, pending NM gastric emptying study to assess for gastroparesis.     #Episodes of Vomiting: resolved as of 5/6/24  - On PEG feed   - Aspiration risk ; C Xray done   - Can restart the PEG feeding for now   - attending spoke to GI for possible J tube but pending gastric emptying study for now.     # B/l feet ulcers  - No abscess/cellulitis  - colonized with ESBL strain  - Off loading to prevent pressure sores   - wound care following     #seizure disorder, cont meds   #Down syndrome  #Nonverbal bedbound  # PEG tube   - c/w home meds    #Progress Note Handoff    Pending :  gastric emptying study  code status: dnr, dni . poor prognosis

## 2024-05-13 NOTE — PROGRESS NOTE ADULT - SUBJECTIVE AND OBJECTIVE BOX
24H events:    Patient is a 58y old Female who presents with a chief complaint of Pneumonia (12 May 2024 11:54)    Primary diagnosis of Acute sepsis    Family communication:  Contact date:  Name of person contacted:  Relationship to patient:  Communication details:  What matters most:    PAST MEDICAL & SURGICAL HISTORY  Down syndrome    Osteoporosis    Mild anemia    Neuropathy    S/P debridement  of R hip on 3/2/21    SOCIAL HISTORY:  Social History:    ALLERGIES:  No Known Allergies    MEDICATIONS:  STANDING MEDICATIONS  albuterol/ipratropium for Nebulization 3 milliLiter(s) Nebulizer every 6 hours  AQUAPHOR (petrolatum Ointment) 1 Application(s) Topical two times a day  artificial  tears Solution 1 Drop(s) Both EYES two times a day  calcium carbonate   1250 mG (OsCal) 1 Tablet(s) Oral daily  chlorhexidine 2% Cloths 1 Application(s) Topical daily  enoxaparin Injectable 30 milliGRAM(s) SubCutaneous every 24 hours  famotidine    Tablet 20 milliGRAM(s) Oral two times a day  gabapentin Solution 300 milliGRAM(s) Oral two times a day  hydrocortisone 1% Cream 1 Application(s) Topical daily  levETIRAcetam  Solution 750 milliGRAM(s) Oral two times a day  midodrine 5 milliGRAM(s) Oral every 8 hours  raloxifene 60 milliGRAM(s) Oral daily  scopolamine 1 mG/72 Hr(s) Patch 1 Patch Transdermal every 72 hours  senna 2 Tablet(s) Oral at bedtime  silver sulfADIAZINE 1% Cream 1 Application(s) Topical two times a day    PRN MEDICATIONS  acetaminophen     Tablet .. 650 milliGRAM(s) Oral every 6 hours PRN  aluminum hydroxide/magnesium hydroxide/simethicone Suspension 30 milliLiter(s) Oral every 4 hours PRN  ibuprofen  Tablet. 400 milliGRAM(s) Oral every 6 hours PRN  melatonin 3 milliGRAM(s) Oral at bedtime PRN  ondansetron Injectable 4 milliGRAM(s) IV Push every 8 hours PRN    VITALS:   T(F): 98.1  HR: 106  BP: 90/57  RR: 18  SpO2: 97%    PHYSICAL EXAM:  GENERAL:   ( x ) NAD, lying in bed comfortably     (  ) obtunded     (  ) lethargic     (  ) somnolent    HEAD:   ( x ) Atraumatic     (  ) hematoma     (  ) laceration (specify location:       )     NECK:  (x  ) Supple     (  ) neck stiffness     (  ) nuchal rigidity     (  )  no JVD     (  ) JVD present ( -- cm)    HEART:  Rate -->     (  x) normal rate     (  ) bradycardic     (  ) tachycardic  Rhythm -->     ( x ) regular     (  ) regularly irregular     (  ) irregularly irregular  Murmurs -->     (  ) normal s1s2     (  ) systolic murmur     (  ) diastolic murmur     (  ) continuous murmur      (  ) S3 present     (  ) S4 present    LUNGS:   (x  )Unlabored respirations     (  ) tachypnea  (  x) B/L air entry     (  ) decreased breath sounds in:  (location     )    (  ) no adventitious sound     (  ) crackles     (  ) wheezing      (  ) rhonchi      (specify location:       )  (  ) chest wall tenderness (specify location:       )    ABDOMEN:   ( x ) Soft     (  ) tense   |   (x  ) nondistended     (  ) distended   |   (  ) +BS     (  ) hypoactive bowel sounds     (  ) hyperactive bowel sounds  (  ) nontender     (  ) RUQ tenderness     (  ) RLQ tenderness     (  ) LLQ tenderness     (  ) epigastric tenderness     (  ) diffuse tenderness  (  ) Splenomegaly      (  ) Hepatomegaly      (  ) Jaundice     (  ) ecchymosis     EXTREMITIES:  ( x ) Normal     (  ) Rash     (  ) ecchymosis     (  ) varicose veins      (  ) pitting edema     (  ) non-pitting edema   (  ) ulceration     (  ) gangrene:     (location:     )    NERVOUS SYSTEM:    ( x ) A&Ox0     (  ) confused     (  ) lethargic  CN II-XII:     (  ) Intact     (  ) deficits found     (Specify:     )   Upper extremities:     (  ) no sensorimotor deficits     (  ) weakness     (  ) loss of proprioception/vibration     (  ) loss of touch/temperature (specify:    )  Lower extremities:     (  ) no sensorimotor deficits     (  ) weakness     (  ) loss of proprioception/vibration     (  ) loss of touch/temperature (specify:    )

## 2024-05-13 NOTE — PROGRESS NOTE ADULT - SUBJECTIVE AND OBJECTIVE BOX
TEA ECHAVARRIA  58y  Female      Patient is a 58y old  Female who presents with a chief complaint of Pneumonia.       INTERVAL HPI/OVERNIGHT EVENTS:      ******************************* REVIEW OF SYSTEMS:**********************************************    All other review of systems negative    *********************** VITALS ******************************************    T(F): 98.1 (05-13-24 @ 05:00)  HR: 106 (05-13-24 @ 08:47) (44 - 114)  BP: 90/57 (05-13-24 @ 08:47) (83/46 - 99/66)  RR: 18 (05-13-24 @ 08:47) (18 - 19)  SpO2: 97% (05-13-24 @ 08:47) (95% - 97%)    05-12-24 @ 07:01  -  05-13-24 @ 07:00  --------------------------------------------------------  IN: 1350 mL / OUT: 500 mL / NET: 850 mL            05-12-24 @ 07:01  -  05-13-24 @ 07:00  --------------------------------------------------------  IN: 1350 mL / OUT: 500 mL / NET: 850 mL        ******************************** PHYSICAL EXAM:**************************************************  GENERAL: NAD    PSYCH: no agitation, baseline mentation  HEENT:     NERVOUS SYSTEM:  Alert & awake x 1-2,     PULMONARY: MARIE, CTA    CARDIOVASCULAR: S1S2 RRR    GI: Soft, NT, ND; BS present. PEG     EXTREMITIES:  2+ Peripheral Pulses, No clubbing, cyanosis, or edema    LYMPH: No lymphadenopathy noted    SKIN: No rashes or lesions      **************************** LABS *******************************************************                          9.7    9.95  )-----------( 646      ( 13 May 2024 11:10 )             32.0                 Lactate Trend  05-11 @ 07:13 Lactate:1.7   05-09 @ 06:58 Lactate:2.2         CAPILLARY BLOOD GLUCOSE      POCT Blood Glucose.: 115 mg/dL (13 May 2024 11:29)          **************************Active Medications *******************************************  No Known Allergies      acetaminophen     Tablet .. 650 milliGRAM(s) Oral every 6 hours PRN  albuterol/ipratropium for Nebulization 3 milliLiter(s) Nebulizer every 6 hours  aluminum hydroxide/magnesium hydroxide/simethicone Suspension 30 milliLiter(s) Oral every 4 hours PRN  AQUAPHOR (petrolatum Ointment) 1 Application(s) Topical two times a day  artificial  tears Solution 1 Drop(s) Both EYES two times a day  calcium carbonate   1250 mG (OsCal) 1 Tablet(s) Oral daily  chlorhexidine 2% Cloths 1 Application(s) Topical daily  enoxaparin Injectable 30 milliGRAM(s) SubCutaneous every 24 hours  famotidine    Tablet 20 milliGRAM(s) Oral two times a day  gabapentin Solution 300 milliGRAM(s) Oral two times a day  hydrocortisone 1% Cream 1 Application(s) Topical daily  ibuprofen  Tablet. 400 milliGRAM(s) Oral every 6 hours PRN  levETIRAcetam  Solution 750 milliGRAM(s) Oral two times a day  melatonin 3 milliGRAM(s) Oral at bedtime PRN  midodrine 5 milliGRAM(s) Oral every 8 hours  ondansetron Injectable 4 milliGRAM(s) IV Push every 8 hours PRN  raloxifene 60 milliGRAM(s) Oral daily  scopolamine 1 mG/72 Hr(s) Patch 1 Patch Transdermal every 72 hours  senna 2 Tablet(s) Oral at bedtime  silver sulfADIAZINE 1% Cream 1 Application(s) Topical two times a day      ***************************************************  RADIOLOGY & ADDITIONAL TESTS:    Imaging Personally Reviewed:  [ ] YES  [ ] NO    HEALTH ISSUES - PROBLEM Dx:  Septic shock    Acute respiratory failure with hypoxia    Advanced care planning/counseling discussion    Encounter for palliative care

## 2024-05-13 NOTE — PROGRESS NOTE ADULT - ASSESSMENT
57yo F w/ pmhx of Down's syndrome, cerebral palsy, seizure disorder presents from [facility] for respiratory distress. Admitted to SDU for severe sepsis and acute hypoxemic respiratory failure likely secondary to recurrent CAP.     # Acute hypoxemic respiratory failure   #Severe sepsis with septic shock poa  #possible recurrent pneumonia /suspected aspiration pna ( despite having peg )   #chronic hypotension   - covid/RSV, MSRA, negative; Bl cx with contaminant   - C/w Midodrine 5mg  - target MAP 60 ( Patient always has BP on the softer side )   - blood cx 4/15: staph capitis  - blood cx 4/15 and 4/17: NGTD  - Scopolamine for secretions. aspiration precautions   - Palliative care following   - 4/22 and 4/23: spiked fever and WBC ;WBC downtrending (10 on 4/26)  - F/u bcx 4/23 -> no growth to date  - CT chest angio w/ contrast 4/24: bilateral opacities   - currently on 3L NC   -  fungitell  >> nEG   -Blood cx >> NGTD 04/28  - 5/5-5/6:  overnight patient was tachy to 140s, hypotensive (around baseline), febrile to 38.5, given 1L LR bolus and then additional 500cc for elevated lactate of 3  - lactate improved overnight, tachy and febrile this am on 5/7/24, given 500cc bolus, CT showing asp pna,  - given 1x danna and vanco dose, will continue Meropenem  1g q8   - will reduce volume of diet bolus to 250 cc due to suspected gastric distention contributing to aspiration   - 5/9: Tmax 100.5     5/10  Tmax 100.1 5/11 5/11  Tmax 99    5/12  Tmax 98  - Trending down WBC   22 >> 16 >> 14 >> 8K today.   reduced the volume of bolus feeds given high suspicion for aspiration from hiatal hernia     #Episodes of Vomiting >> possibly complicated by Aspiration   - recurrent aspiration risk is high for coexisting hiatal hernia   - On PEG feed  >> ? consider J- tube, will f/u gastric emptying study   - will d/w GI team.     # B/l feet ulcers  - No abscess/cellulitis  - colonized with ESBL strain  - Off loading to prevent pressure sores   - wound care following     #seizure disorder, cont meds   #Down syndrome  #Nonverbal bedbound  # PEG tube   - c/w home meds    #Progress Note Handoff    Pending : IV Abx/  / NM Gastric emptying    Family discussion: will need meeting with group home admin prior to return   Disposition: group home

## 2024-05-14 LAB
ANION GAP SERPL CALC-SCNC: 12 MMOL/L — SIGNIFICANT CHANGE UP (ref 7–14)
BUN SERPL-MCNC: 12 MG/DL — SIGNIFICANT CHANGE UP (ref 10–20)
CALCIUM SERPL-MCNC: 9 MG/DL — SIGNIFICANT CHANGE UP (ref 8.4–10.5)
CHLORIDE SERPL-SCNC: 99 MMOL/L — SIGNIFICANT CHANGE UP (ref 98–110)
CO2 SERPL-SCNC: 29 MMOL/L — SIGNIFICANT CHANGE UP (ref 17–32)
CREAT SERPL-MCNC: <0.5 MG/DL — LOW (ref 0.7–1.5)
EGFR: 115 ML/MIN/1.73M2 — SIGNIFICANT CHANGE UP
GLUCOSE BLDC GLUCOMTR-MCNC: 111 MG/DL — HIGH (ref 70–99)
GLUCOSE BLDC GLUCOMTR-MCNC: 119 MG/DL — HIGH (ref 70–99)
GLUCOSE BLDC GLUCOMTR-MCNC: 130 MG/DL — HIGH (ref 70–99)
GLUCOSE SERPL-MCNC: 118 MG/DL — HIGH (ref 70–99)
HCT VFR BLD CALC: 29.4 % — LOW (ref 37–47)
HGB BLD-MCNC: 9 G/DL — LOW (ref 12–16)
MCHC RBC-ENTMCNC: 29 PG — SIGNIFICANT CHANGE UP (ref 27–31)
MCHC RBC-ENTMCNC: 30.6 G/DL — LOW (ref 32–37)
MCV RBC AUTO: 94.8 FL — SIGNIFICANT CHANGE UP (ref 81–99)
NRBC # BLD: 0 /100 WBCS — SIGNIFICANT CHANGE UP (ref 0–0)
PLATELET # BLD AUTO: 622 K/UL — HIGH (ref 130–400)
PMV BLD: 9.8 FL — SIGNIFICANT CHANGE UP (ref 7.4–10.4)
POTASSIUM SERPL-MCNC: 4.4 MMOL/L — SIGNIFICANT CHANGE UP (ref 3.5–5)
POTASSIUM SERPL-SCNC: 4.4 MMOL/L — SIGNIFICANT CHANGE UP (ref 3.5–5)
RBC # BLD: 3.1 M/UL — LOW (ref 4.2–5.4)
RBC # FLD: 21.6 % — HIGH (ref 11.5–14.5)
SODIUM SERPL-SCNC: 140 MMOL/L — SIGNIFICANT CHANGE UP (ref 135–146)
WBC # BLD: 8.56 K/UL — SIGNIFICANT CHANGE UP (ref 4.8–10.8)
WBC # FLD AUTO: 8.56 K/UL — SIGNIFICANT CHANGE UP (ref 4.8–10.8)

## 2024-05-14 PROCEDURE — 99232 SBSQ HOSP IP/OBS MODERATE 35: CPT

## 2024-05-14 PROCEDURE — 78264 GASTRIC EMPTYING IMG STUDY: CPT | Mod: 26

## 2024-05-14 RX ADMIN — CHLORHEXIDINE GLUCONATE 1 APPLICATION(S): 213 SOLUTION TOPICAL at 11:01

## 2024-05-14 RX ADMIN — FAMOTIDINE 20 MILLIGRAM(S): 10 INJECTION INTRAVENOUS at 18:01

## 2024-05-14 RX ADMIN — MIDODRINE HYDROCHLORIDE 5 MILLIGRAM(S): 2.5 TABLET ORAL at 05:13

## 2024-05-14 RX ADMIN — SENNA PLUS 2 TABLET(S): 8.6 TABLET ORAL at 21:24

## 2024-05-14 RX ADMIN — GABAPENTIN 300 MILLIGRAM(S): 400 CAPSULE ORAL at 18:01

## 2024-05-14 RX ADMIN — Medication 1 TABLET(S): at 11:00

## 2024-05-14 RX ADMIN — LEVETIRACETAM 750 MILLIGRAM(S): 250 TABLET, FILM COATED ORAL at 18:00

## 2024-05-14 RX ADMIN — Medication 1 APPLICATION(S): at 18:01

## 2024-05-14 RX ADMIN — Medication 1 DROP(S): at 05:13

## 2024-05-14 RX ADMIN — ENOXAPARIN SODIUM 30 MILLIGRAM(S): 100 INJECTION SUBCUTANEOUS at 14:03

## 2024-05-14 RX ADMIN — Medication 3 MILLILITER(S): at 19:39

## 2024-05-14 RX ADMIN — GABAPENTIN 300 MILLIGRAM(S): 400 CAPSULE ORAL at 05:13

## 2024-05-14 RX ADMIN — LEVETIRACETAM 750 MILLIGRAM(S): 250 TABLET, FILM COATED ORAL at 05:13

## 2024-05-14 RX ADMIN — Medication 1 APPLICATION(S): at 05:14

## 2024-05-14 RX ADMIN — Medication 1 APPLICATION(S): at 18:00

## 2024-05-14 RX ADMIN — RALOXIFENE HYDROCHLORIDE 60 MILLIGRAM(S): 60 TABLET, COATED ORAL at 11:00

## 2024-05-14 RX ADMIN — Medication 3 MILLILITER(S): at 09:25

## 2024-05-14 RX ADMIN — FAMOTIDINE 20 MILLIGRAM(S): 10 INJECTION INTRAVENOUS at 05:13

## 2024-05-14 RX ADMIN — Medication 1 APPLICATION(S): at 11:01

## 2024-05-14 RX ADMIN — Medication 1 DROP(S): at 18:00

## 2024-05-14 RX ADMIN — MIDODRINE HYDROCHLORIDE 5 MILLIGRAM(S): 2.5 TABLET ORAL at 14:03

## 2024-05-14 RX ADMIN — SCOPALAMINE 1 PATCH: 1 PATCH, EXTENDED RELEASE TRANSDERMAL at 19:15

## 2024-05-14 RX ADMIN — SCOPALAMINE 1 PATCH: 1 PATCH, EXTENDED RELEASE TRANSDERMAL at 05:43

## 2024-05-14 NOTE — PRE-OP CHECKLIST - BMI (KG/M2)
Continue with   Keppra 500 mg twice a day   Lamictal 450 mg twice a day    Call if problems    Please get medication through levels.    F/u one year   23.8

## 2024-05-14 NOTE — PROGRESS NOTE ADULT - ASSESSMENT
57yo F w/ pmhx of Down's syndrome, cerebral palsy, seizure disorder presents from [facility] for respiratory distress. Admitted to SDU for severe sepsis and acute hypoxemic respiratory failure likely secondary to recurrent CAP.     # Acute hypoxemic respiratory failure   #Severe sepsis with septic shock poa  #possible recurrent pneumonia /suspected aspiration pna ( despite having peg )   #chronic hypotension   - covid/RSV, MSRA, negative; Bl cx with contaminant   - C/w Midodrine 5mg  - target MAP 60 ( Patient always has BP on the softer side )   - blood cx 4/15: staph capitis  - blood cx 4/15 and 4/17: NGTD  - Scopolamine for secretions. aspiration precautions   - Palliative care following   - 4/22 and 4/23: spiked fever and WBC ;WBC downtrending (10 on 4/26)  - F/u bcx 4/23 -> no growth to date  - CT chest angio w/ contrast 4/24: bilateral opacities   - currently on 3L NC   -  fungitell  >> nEG   -Blood cx >> NGTD 04/28  - 5/5-5/6:  overnight patient was tachy to 140s, hypotensive (around baseline), febrile to 38.5, given 1L LR bolus and then additional 500cc for elevated lactate of 3  - lactate improved overnight, tachy and febrile this am on 5/7/24, given 500cc bolus, CT showing asp pna,  - given 1x danna and vanco dose,    s/p  Meropenem  1g q8  x mutiple courses.   - will reduce volume of diet bolus to 250 cc due to suspected gastric distention contributing to aspiration   - remains afebrile > 72 hrs   - Trending down WBC   22 >> 16 >> 14 >> 8K .   reduced the volume of bolus feeds given high suspicion for aspiration from hiatal hernia     #Episodes of Vomiting >> possibly complicated by Aspiration   - recurrent aspiration risk is high for coexisting hiatal hernia   - On PEG feed  >> ? consider J- tube,     awaiting  gastric emptying study, possibly today.   - will d/w GI team.     # B/l feet ulcers  - No abscess/cellulitis  - colonized with ESBL strain  - Off loading to prevent pressure sores   - wound care following     #seizure disorder, cont meds   #Down syndrome  #Nonverbal bedbound  # PEG tube   - c/w home meds    #Progress Note Handoff    Pending :  / NM Gastric emptying    Family discussion: will need meeting with group home admin prior to return   Disposition: group home

## 2024-05-14 NOTE — PROGRESS NOTE ADULT - SUBJECTIVE AND OBJECTIVE BOX
TEA ECHAVARRIA  58y  Female      Patient is a 58y old  Female who presents with a chief complaint of Pneumonia.       INTERVAL HPI/OVERNIGHT EVENTS:      ******************************* REVIEW OF SYSTEMS:**********************************************    All other review of systems negative    *********************** VITALS ******************************************    T(F): 98 (05-14-24 @ 05:13)  HR: 76 (05-14-24 @ 09:18) (76 - 110)  BP: 98/65 (05-14-24 @ 05:13) (80/46 - 98/65)  RR: 19 (05-14-24 @ 05:13) (18 - 19)  SpO2: 100% (05-14-24 @ 09:18) (93% - 100%)    05-13-24 @ 07:01  -  05-14-24 @ 07:00  --------------------------------------------------------  IN: 450 mL / OUT: 900 mL / NET: -450 mL            05-13-24 @ 07:01  -  05-14-24 @ 07:00  --------------------------------------------------------  IN: 450 mL / OUT: 900 mL / NET: -450 mL        ******************************** PHYSICAL EXAM:**************************************************  GENERAL: NAD    PSYCH: no agitation, baseline mentation  HEENT:     NERVOUS SYSTEM:  Alert & awake x1-2    PULMONARY: MARIE, CTA    CARDIOVASCULAR: S1S2 RRR    GI: Soft, NT, ND; BS present.    EXTREMITIES:  2+ Peripheral Pulses, No clubbing, cyanosis, or edema    LYMPH: No lymphadenopathy noted    SKIN: No rashes or lesions      **************************** LABS *******************************************************                          9.0    8.56  )-----------( 622      ( 14 May 2024 06:09 )             29.4     05-14    140  |  99  |  12  ----------------------------<  118<H>  4.4   |  29  |  <0.5<L>    Ca    9.0      14 May 2024 06:09        Urinalysis Basic - ( 14 May 2024 06:09 )    Color: x / Appearance: x / SG: x / pH: x  Gluc: 118 mg/dL / Ketone: x  / Bili: x / Urobili: x   Blood: x / Protein: x / Nitrite: x   Leuk Esterase: x / RBC: x / WBC x   Sq Epi: x / Non Sq Epi: x / Bacteria: x        Lactate Trend  05-11 @ 07:13 Lactate:1.7         CAPILLARY BLOOD GLUCOSE      POCT Blood Glucose.: 111 mg/dL (14 May 2024 08:03)          **************************Active Medications *******************************************  No Known Allergies      acetaminophen     Tablet .. 650 milliGRAM(s) Oral every 6 hours PRN  albuterol/ipratropium for Nebulization 3 milliLiter(s) Nebulizer every 6 hours  aluminum hydroxide/magnesium hydroxide/simethicone Suspension 30 milliLiter(s) Oral every 4 hours PRN  AQUAPHOR (petrolatum Ointment) 1 Application(s) Topical two times a day  artificial  tears Solution 1 Drop(s) Both EYES two times a day  calcium carbonate   1250 mG (OsCal) 1 Tablet(s) Oral daily  chlorhexidine 2% Cloths 1 Application(s) Topical daily  enoxaparin Injectable 30 milliGRAM(s) SubCutaneous every 24 hours  famotidine    Tablet 20 milliGRAM(s) Oral two times a day  gabapentin Solution 300 milliGRAM(s) Oral two times a day  hydrocortisone 1% Cream 1 Application(s) Topical daily  ibuprofen  Tablet. 400 milliGRAM(s) Oral every 6 hours PRN  levETIRAcetam  Solution 750 milliGRAM(s) Oral two times a day  melatonin 3 milliGRAM(s) Oral at bedtime PRN  midodrine 5 milliGRAM(s) Oral every 8 hours  ondansetron Injectable 4 milliGRAM(s) IV Push every 8 hours PRN  raloxifene 60 milliGRAM(s) Oral daily  scopolamine 1 mG/72 Hr(s) Patch 1 Patch Transdermal every 72 hours  senna 2 Tablet(s) Oral at bedtime  silver sulfADIAZINE 1% Cream 1 Application(s) Topical two times a day      ***************************************************  RADIOLOGY & ADDITIONAL TESTS:    Imaging Personally Reviewed:  [ ] YES  [ ] NO    HEALTH ISSUES - PROBLEM Dx:  Septic shock    Acute respiratory failure with hypoxia    Advanced care planning/counseling discussion    Encounter for palliative care

## 2024-05-14 NOTE — PROGRESS NOTE ADULT - SUBJECTIVE AND OBJECTIVE BOX
24H events:    Patient is a 58y old Female who presents with a chief complaint of Pneumonia (13 May 2024 12:39)    Primary diagnosis of Acute sepsis    Family communication:  Contact date:  Name of person contacted:  Relationship to patient:  Communication details:  What matters most:    PAST MEDICAL & SURGICAL HISTORY  Down syndrome    Osteoporosis    Mild anemia    Neuropathy    S/P debridement  of R hip on 3/2/21    SOCIAL HISTORY:  Social History:    ALLERGIES:  No Known Allergies    MEDICATIONS:  STANDING MEDICATIONS  albuterol/ipratropium for Nebulization 3 milliLiter(s) Nebulizer every 6 hours  AQUAPHOR (petrolatum Ointment) 1 Application(s) Topical two times a day  artificial  tears Solution 1 Drop(s) Both EYES two times a day  calcium carbonate   1250 mG (OsCal) 1 Tablet(s) Oral daily  chlorhexidine 2% Cloths 1 Application(s) Topical daily  enoxaparin Injectable 30 milliGRAM(s) SubCutaneous every 24 hours  famotidine    Tablet 20 milliGRAM(s) Oral two times a day  gabapentin Solution 300 milliGRAM(s) Oral two times a day  hydrocortisone 1% Cream 1 Application(s) Topical daily  levETIRAcetam  Solution 750 milliGRAM(s) Oral two times a day  midodrine 5 milliGRAM(s) Oral every 8 hours  raloxifene 60 milliGRAM(s) Oral daily  scopolamine 1 mG/72 Hr(s) Patch 1 Patch Transdermal every 72 hours  senna 2 Tablet(s) Oral at bedtime  silver sulfADIAZINE 1% Cream 1 Application(s) Topical two times a day    PRN MEDICATIONS  acetaminophen     Tablet .. 650 milliGRAM(s) Oral every 6 hours PRN  aluminum hydroxide/magnesium hydroxide/simethicone Suspension 30 milliLiter(s) Oral every 4 hours PRN  ibuprofen  Tablet. 400 milliGRAM(s) Oral every 6 hours PRN  melatonin 3 milliGRAM(s) Oral at bedtime PRN  ondansetron Injectable 4 milliGRAM(s) IV Push every 8 hours PRN    VITALS:   T(F): 98  HR: 76  BP: 98/65  RR: 19  SpO2: 100%    PHYSICAL EXAM:  GENERAL:   ( x ) NAD, lying in bed comfortably     (  ) obtunded     (  ) lethargic     (  ) somnolent    HEAD:   (x  ) Atraumatic     (  ) hematoma     (  ) laceration (specify location:       )     NECK:  (x  ) Supple     (  ) neck stiffness     (  ) nuchal rigidity     (  )  no JVD     (  ) JVD present ( -- cm)    HEART:  Rate -->     (x  ) normal rate     (  ) bradycardic     (  ) tachycardic  Rhythm -->     x(  ) regular     (  ) regularly irregular     (  ) irregularly irregular  Murmurs -->     (  ) normal s1s2     (  ) systolic murmur     (  ) diastolic murmur     (  ) continuous murmur      (  ) S3 present     (  ) S4 present    LUNGS:   ( x )Unlabored respirations     (  ) tachypnea  ( x ) B/L air entry     (  ) decreased breath sounds in:  (location     )    (  ) no adventitious sound     (  ) crackles     (  ) wheezing      (  ) rhonchi      (specify location:       )  (  ) chest wall tenderness (specify location:       )    ABDOMEN:   (  x) Soft     (  ) tense   |   (  x) nondistended     (  ) distended   |   (  ) +BS     (  ) hypoactive bowel sounds     (  ) hyperactive bowel sounds  (  ) nontender     (  ) RUQ tenderness     (  ) RLQ tenderness     (  ) LLQ tenderness     (  ) epigastric tenderness     (  ) diffuse tenderness  (  ) Splenomegaly      (  ) Hepatomegaly      (  ) Jaundice     (  ) ecchymosis     EXTREMITIES:  ( x ) Normal     (  ) Rash     (  ) ecchymosis     (  ) varicose veins      (  ) pitting edema     (  ) non-pitting edema   (  ) ulceration     (  ) gangrene:     (location:     )    NERVOUS SYSTEM:    (  ) A&Ox3     (  ) confused     (  ) lethargic  CN II-XII:     (  ) Intact     (  ) deficits found     (Specify:     )   Upper extremities:     (  ) no sensorimotor deficits     (  ) weakness     (  ) loss of proprioception/vibration     (  ) loss of touch/temperature (specify:    )  Lower extremities:     (  ) no sensorimotor deficits     (  ) weakness     (  ) loss of proprioception/vibration     (  ) loss of touch/temperature (specify:    )    LABS:                        9.0    8.56  )-----------( 622      ( 14 May 2024 06:09 )             29.4     05-14    140  |  99  |  12  ----------------------------<  118<H>  4.4   |  29  |  <0.5<L>    Ca    9.0      14 May 2024 06:09    Urinalysis Basic - ( 14 May 2024 06:09 )    Color: x / Appearance: x / SG: x / pH: x  Gluc: 118 mg/dL / Ketone: x  / Bili: x / Urobili: x   Blood: x / Protein: x / Nitrite: x   Leuk Esterase: x / RBC: x / WBC x   Sq Epi: x / Non Sq Epi: x / Bacteria: x

## 2024-05-14 NOTE — CHART NOTE - NSCHARTNOTEFT_GEN_A_CORE
Registered Dietitian Follow-Up     Patient Profile Reviewed                           Yes [x]   No []     Nutrition History Previously Obtained        Yes [x]  No []       Pertinent Subjective Information: Aid at bedside reports that pt is tolerating her feeds and not having GI s/s. last feeding was yesterday per I&O's. No issues noted in POC RN notes. NPO after MN and this morning for test.      Pertinent Medical Interventions:  # Acute hypoxemic respiratory failure   #Severe sepsis with septic shock poa  #possible recurrent pneumonia /suspected aspiration pna ( despite having peg )   - 5/14: Pt is NPO for gastric emptying study, monitoring off abx, afebrile and no wbc count  #Episodes of Vomiting: resolved as of 5/6/24  - On PEG feed     Diet order: Diet, NPO after Midnight:      NPO Start Date: 13-May-2024,   NPO Start Time: 23:59  Except Medications (05-13-24 @ 15:11) [Active]  Diet, NPO:   Tube Feeding Modality: Gastrostomy  Jevity 1.2 Juventino (JEVITY1.2RTH)  Total Volume for 24 Hours (mL): 750  Bolus  Total # of Feeds: 3  Tube Feed Frequency: Every 8 hours   Tube Feed Start Time: 14:00  Bolus Feed Rate (mL per Hour): 250   Bolus Feed Duration (in Hours): 1  Free Water Flush   Total Volume of Bolus (mL):  250  Free Water Flush Instructions:  100 mL water flushes pre/post feeds (05-09-24 @ 10:15) [Active]    Anthropometrics:  Height (cm): 134 (05-14-24 @ 02:54)  Weight (kg): 42.7 (05-14-24 @ 02:54)  BMI (kg/m2): 23.8 (05-14-24 @ 02:54)  IBW: 37.5kg     MEDICATIONS  (STANDING):  albuterol/ipratropium for Nebulization 3 milliLiter(s) Nebulizer every 6 hours  calcium carbonate   1250 mG (OsCal) 1 Tablet(s) Oral daily  enoxaparin Injectable 30 milliGRAM(s) SubCutaneous every 24 hours  famotidine    Tablet 20 milliGRAM(s) Oral two times a day  gabapentin Solution 300 milliGRAM(s) Oral two times a day  levETIRAcetam  Solution 750 milliGRAM(s) Oral two times a day  midodrine 5 milliGRAM(s) Oral every 8 hours  raloxifene 60 milliGRAM(s) Oral dailyTransdermal every 72 hours  senna 2 Tablet(s) Oral at bedtime    MEDICATIONS  (PRN):  aluminum hydroxide/magnesium hydroxide/simethicone Suspension 30 milliLiter(s) Oral every 4 hours PRN Dyspepsia  ibuprofen  Tablet. 400 milliGRAM(s) Oral every 6 hours PRN Temp greater or equal to 38C (100.4F)  ondansetron Injectable 4 milliGRAM(s) IV Push every 8 hours PRN Nausea and/or Vomiting    Pertinent Labs: 05-14 @ 06:09: Na 140, BUN 12, Cr <0.5<L>, <H>, K+ 4.4, Phos --, Mg --, Alk Phos --, ALT/SGPT --, AST/SGOT --, HbA1c --    Finger Sticks:  POCT Blood Glucose.: 111 mg/dL (05-14 @ 08:03)  POCT Blood Glucose.: 112 mg/dL (05-13 @ 21:19)  POCT Blood Glucose.: 124 mg/dL (05-13 @ 16:53)  POCT Blood Glucose.: 115 mg/dL (05-13 @ 11:29)    Physical Findings:  - Appearance: unable to speak/KAYLAH  - GI function: fecal incontinence, last BM 5/12  - Tubes: PEG   - Oral/Mouth cavity: NPO w/ TF   - Skin: R heel - unstageable Pressure Injury per WOCN note 4/15  - Edema: 2+ L/R hand edema      Nutrition Requirements  Weight Used: 44.8 kg - per RD assessment (4/20)      Estimated Energy Needs    Continue [x]  Adjust []  1013-1266kcal/day (using 1.2-1.5AF due to PI vs. suspected PCM)     Estimated Protein Needs    Continue [x]  Adjust []  54-68g/day (using 1.2-1.5g/kg -- same reason as above)      Estimated Fluid Needs        Continue [x]  Adjust []  1344 - 1568 mL/day (30 - 35 mL/kg)      EN regimen with Jevity 1.2 provides 1350 mL total volume, 1620 kcal, 74.9 gm Protein, 1093.5 mL free water    [] Previous Nutrition Diagnosis: Unintended weight loss             [x] Ongoing          [] Resolved     Nutrition Education: EN, coordination of care     Patient will meet >85% and <105% of estimated nutrient needs within 3-5 days      Nutrition Monitoring:diet order,energy intake,body composition,NFPF, lytes, GI (BM), BG     Recommendation:  1. Once pt is able to reciev Tf's again, please change TF regimen to   2) Monitor BM, GI s/s   3) Monitor electrolytes, BG, renal profile  4) Maintain HOB >45    high risk f/u 3-5 days to monitor pt after gastric emptying study   RD remains available: Samra Linder, RDN [phone] Registered Dietitian Follow-Up     Patient Profile Reviewed                           Yes [x]   No []     Nutrition History Previously Obtained        Yes [x]  No []       Pertinent Subjective Information: Aid at bedside reports that pt is tolerating her feeds and not having GI s/s. last feeding was yesterday per I&O's. No issues noted in POC RN notes. NPO after MN and this morning for test.      Pertinent Medical Interventions:  # Acute hypoxemic respiratory failure   #Severe sepsis with septic shock poa  #possible recurrent pneumonia /suspected aspiration pna ( despite having peg )   - 5/14: Pt is NPO for gastric emptying study, monitoring off abx, afebrile and no wbc count  #Episodes of Vomiting: resolved as of 5/6/24  - On PEG feed     Diet order: Diet, NPO after Midnight:      NPO Start Date: 13-May-2024,   NPO Start Time: 23:59  Except Medications (05-13-24 @ 15:11) [Active]  Diet, NPO:   Tube Feeding Modality: Gastrostomy  Jevity 1.2 Juventino (JEVITY1.2RTH)  Total Volume for 24 Hours (mL): 750  Bolus  Total # of Feeds: 3  Tube Feed Frequency: Every 8 hours   Tube Feed Start Time: 14:00  Bolus Feed Rate (mL per Hour): 250   Bolus Feed Duration (in Hours): 1  Free Water Flush   Total Volume of Bolus (mL):  250  Free Water Flush Instructions:  100 mL water flushes pre/post feeds (05-09-24 @ 10:15) [Active]    Anthropometrics:  Height (cm): 134 (05-14-24 @ 02:54)  Weight (kg): 42.7 (05-14-24 @ 02:54)  BMI (kg/m2): 23.8 (05-14-24 @ 02:54)  IBW: 37.5kg     MEDICATIONS  (STANDING):  albuterol/ipratropium for Nebulization 3 milliLiter(s) Nebulizer every 6 hours  calcium carbonate   1250 mG (OsCal) 1 Tablet(s) Oral daily  enoxaparin Injectable 30 milliGRAM(s) SubCutaneous every 24 hours  famotidine    Tablet 20 milliGRAM(s) Oral two times a day  gabapentin Solution 300 milliGRAM(s) Oral two times a day  levETIRAcetam  Solution 750 milliGRAM(s) Oral two times a day  midodrine 5 milliGRAM(s) Oral every 8 hours  raloxifene 60 milliGRAM(s) Oral dailyTransdermal every 72 hours  senna 2 Tablet(s) Oral at bedtime    MEDICATIONS  (PRN):  aluminum hydroxide/magnesium hydroxide/simethicone Suspension 30 milliLiter(s) Oral every 4 hours PRN Dyspepsia  ibuprofen  Tablet. 400 milliGRAM(s) Oral every 6 hours PRN Temp greater or equal to 38C (100.4F)  ondansetron Injectable 4 milliGRAM(s) IV Push every 8 hours PRN Nausea and/or Vomiting    Pertinent Labs: 05-14 @ 06:09: Na 140, BUN 12, Cr <0.5<L>, <H>, K+ 4.4, Phos --, Mg --, Alk Phos --, ALT/SGPT --, AST/SGOT --, HbA1c --    Finger Sticks:  POCT Blood Glucose.: 111 mg/dL (05-14 @ 08:03)  POCT Blood Glucose.: 112 mg/dL (05-13 @ 21:19)  POCT Blood Glucose.: 124 mg/dL (05-13 @ 16:53)  POCT Blood Glucose.: 115 mg/dL (05-13 @ 11:29)    Physical Findings:  - Appearance: unable to speak/KAYLAH  - GI function: fecal incontinence, last BM 5/12  - Tubes: PEG   - Oral/Mouth cavity: NPO w/ TF   - Skin: R heel - unstageable Pressure Injury per WOCN note 4/15  - Edema: 2+ L/R hand edema      Nutrition Requirements  Weight Used: 44.8 kg - per RD assessment (4/20)      Estimated Energy Needs    Continue [x]  Adjust []  1013-1266kcal/day (using 1.2-1.5AF due to PI vs. suspected PCM)     Estimated Protein Needs    Continue [x]  Adjust []  54-68g/day (using 1.2-1.5g/kg -- same reason as above)      Estimated Fluid Needs        Continue [x]  Adjust []  1344 - 1568 mL/day (30 - 35 mL/kg)      EN regimen with Jevity 1.2 provides 1350 mL total volume, 1620 kcal, 74.9 gm Protein, 1093.5 mL free water    [] Previous Nutrition Diagnosis: Unintended weight loss             [x] Ongoing          [] Resolved     Nutrition Education: EN, coordination of care     Patient will meet >85% and <105% of estimated nutrient needs within 3-5 days      Nutrition Monitoring:diet order,energy intake,body composition,NFPF, lytes, GI (BM), BG     Recommendation:  1. Once pt is able to receive Tf's again, please change TF regimen to Jevity 1.2 at 360 mL q8hrs would provide: 1080 mL total volume, 1296 kcal, 60 gm Protein, 875 mL free water from formula + recommend 70 mL water flushes pre/post feeds  2) Monitor BM, GI s/s   3) Monitor electrolytes, BG, renal profile  4) Maintain HOB >45    high risk f/u 3-5 days to monitor pt after gastric emptying study   RD remains available: Samra Linder RDN [phone]

## 2024-05-14 NOTE — PROGRESS NOTE ADULT - ASSESSMENT
59yo F w/ pmhx of Down's syndrome, cerebral palsy, seizure disorder presents from [facility] for respiratory distress. Admitted to SDU for severe sepsis and acute hypoxemic respiratory failure likely secondary to recurrent CAP.     # Acute hypoxemic respiratory failure   #Severe sepsis with septic shock poa  #possible recurrent pneumonia /suspected aspiration pna ( despite having peg )   #chronic hypotension   - CXR: possible basilar opacities  - covid/RSV, MSRA, negative; Bl cx with contaminant   - C/w Midodrine 5mg  - target MAP 60 ( Patient always has BP on the softer side )   - blood cx 4/15: staph capitis  - blood cx 4/15 and 4/17: NGTD  - Scopolamine for secretions. aspiration precautions   - 4/22 and 4/23: spiked fever and WBC ;WBC downtrending (10 on 4/26)  - F/u bcx 4/23 -> no growth to date  - CT chest angio w/ contrast 4/24: bilateral opacities   - currently on 4L facemask  -  fungitell  >> nEG   - Blood cx >> NGTD 04/28  - Pt is afebrile and wbc count stabilised now.   - given 1x danna and vanco dose, will continue danna 1g q8 --> will monitor off abx for now  - will reduce volume of diet bolus to 250 cc due to suspected gastric distention contributing to aspiration   reduced the volume of bolus feeds given high suspicion for aspiration from hiatal hernia   - 5/10: f/u GI regarding aspiration risk of patient's current G-tube vs possible J-tube placement?  - 5/11: improved tachy. wbc, and bp, monitor on abx for now   - 5/12: afebrile   - 5/13: afebrile, off abx for now, pending NM gastric emptying study to assess for gastroparesis.   - 5/14: Pt is NPO for gastric emptying study, monitoring off abx, afebrile and no wbc count    #Episodes of Vomiting: resolved as of 5/6/24  - On PEG feed   - Aspiration risk ; C Xray done   - attending spoke to GI for possible J tube but pending gastric emptying study for now.     # B/l feet ulcers  - No abscess/cellulitis  - colonized with ESBL strain  - Off loading to prevent pressure sores   - wound care following     #seizure disorder, cont meds   #Down syndrome  #Nonverbal bedbound  # PEG tube   - c/w home meds    #Progress Note Handoff    Pending :  gastric emptying study, discussion about J tube  code status: dnr, dni . poor prognosis

## 2024-05-15 LAB
ANION GAP SERPL CALC-SCNC: 11 MMOL/L — SIGNIFICANT CHANGE UP (ref 7–14)
BUN SERPL-MCNC: 12 MG/DL — SIGNIFICANT CHANGE UP (ref 10–20)
CALCIUM SERPL-MCNC: 9.2 MG/DL — SIGNIFICANT CHANGE UP (ref 8.4–10.5)
CHLORIDE SERPL-SCNC: 99 MMOL/L — SIGNIFICANT CHANGE UP (ref 98–110)
CO2 SERPL-SCNC: 30 MMOL/L — SIGNIFICANT CHANGE UP (ref 17–32)
CREAT SERPL-MCNC: <0.5 MG/DL — LOW (ref 0.7–1.5)
EGFR: 115 ML/MIN/1.73M2 — SIGNIFICANT CHANGE UP
GLUCOSE BLDC GLUCOMTR-MCNC: 103 MG/DL — HIGH (ref 70–99)
GLUCOSE BLDC GLUCOMTR-MCNC: 108 MG/DL — HIGH (ref 70–99)
GLUCOSE BLDC GLUCOMTR-MCNC: 114 MG/DL — HIGH (ref 70–99)
GLUCOSE BLDC GLUCOMTR-MCNC: 156 MG/DL — HIGH (ref 70–99)
GLUCOSE SERPL-MCNC: 151 MG/DL — HIGH (ref 70–99)
HCT VFR BLD CALC: 30.8 % — LOW (ref 37–47)
HGB BLD-MCNC: 9.5 G/DL — LOW (ref 12–16)
MCHC RBC-ENTMCNC: 28.8 PG — SIGNIFICANT CHANGE UP (ref 27–31)
MCHC RBC-ENTMCNC: 30.8 G/DL — LOW (ref 32–37)
MCV RBC AUTO: 93.3 FL — SIGNIFICANT CHANGE UP (ref 81–99)
NRBC # BLD: 0 /100 WBCS — SIGNIFICANT CHANGE UP (ref 0–0)
PLATELET # BLD AUTO: 579 K/UL — HIGH (ref 130–400)
PMV BLD: 9.5 FL — SIGNIFICANT CHANGE UP (ref 7.4–10.4)
POTASSIUM SERPL-MCNC: 4.7 MMOL/L — SIGNIFICANT CHANGE UP (ref 3.5–5)
POTASSIUM SERPL-SCNC: 4.7 MMOL/L — SIGNIFICANT CHANGE UP (ref 3.5–5)
RBC # BLD: 3.3 M/UL — LOW (ref 4.2–5.4)
RBC # FLD: 21.4 % — HIGH (ref 11.5–14.5)
SODIUM SERPL-SCNC: 140 MMOL/L — SIGNIFICANT CHANGE UP (ref 135–146)
WBC # BLD: 10.75 K/UL — SIGNIFICANT CHANGE UP (ref 4.8–10.8)
WBC # FLD AUTO: 10.75 K/UL — SIGNIFICANT CHANGE UP (ref 4.8–10.8)

## 2024-05-15 PROCEDURE — 99232 SBSQ HOSP IP/OBS MODERATE 35: CPT

## 2024-05-15 RX ADMIN — MIDODRINE HYDROCHLORIDE 5 MILLIGRAM(S): 2.5 TABLET ORAL at 13:14

## 2024-05-15 RX ADMIN — Medication 1 APPLICATION(S): at 17:28

## 2024-05-15 RX ADMIN — MIDODRINE HYDROCHLORIDE 5 MILLIGRAM(S): 2.5 TABLET ORAL at 21:54

## 2024-05-15 RX ADMIN — GABAPENTIN 300 MILLIGRAM(S): 400 CAPSULE ORAL at 05:54

## 2024-05-15 RX ADMIN — SENNA PLUS 2 TABLET(S): 8.6 TABLET ORAL at 21:56

## 2024-05-15 RX ADMIN — MIDODRINE HYDROCHLORIDE 5 MILLIGRAM(S): 2.5 TABLET ORAL at 05:58

## 2024-05-15 RX ADMIN — CHLORHEXIDINE GLUCONATE 1 APPLICATION(S): 213 SOLUTION TOPICAL at 11:29

## 2024-05-15 RX ADMIN — SCOPALAMINE 1 PATCH: 1 PATCH, EXTENDED RELEASE TRANSDERMAL at 08:02

## 2024-05-15 RX ADMIN — Medication 3 MILLILITER(S): at 08:59

## 2024-05-15 RX ADMIN — SCOPALAMINE 1 PATCH: 1 PATCH, EXTENDED RELEASE TRANSDERMAL at 05:54

## 2024-05-15 RX ADMIN — Medication 1 APPLICATION(S): at 11:29

## 2024-05-15 RX ADMIN — FAMOTIDINE 20 MILLIGRAM(S): 10 INJECTION INTRAVENOUS at 05:54

## 2024-05-15 RX ADMIN — LEVETIRACETAM 750 MILLIGRAM(S): 250 TABLET, FILM COATED ORAL at 17:29

## 2024-05-15 RX ADMIN — Medication 1 DROP(S): at 05:55

## 2024-05-15 RX ADMIN — GABAPENTIN 300 MILLIGRAM(S): 400 CAPSULE ORAL at 17:32

## 2024-05-15 RX ADMIN — SCOPALAMINE 1 PATCH: 1 PATCH, EXTENDED RELEASE TRANSDERMAL at 06:14

## 2024-05-15 RX ADMIN — Medication 1 DROP(S): at 17:29

## 2024-05-15 RX ADMIN — LEVETIRACETAM 750 MILLIGRAM(S): 250 TABLET, FILM COATED ORAL at 05:54

## 2024-05-15 RX ADMIN — Medication 3 MILLILITER(S): at 13:20

## 2024-05-15 RX ADMIN — Medication 1 APPLICATION(S): at 05:55

## 2024-05-15 RX ADMIN — Medication 3 MILLILITER(S): at 19:34

## 2024-05-15 RX ADMIN — Medication 1 APPLICATION(S): at 05:54

## 2024-05-15 RX ADMIN — SCOPALAMINE 1 PATCH: 1 PATCH, EXTENDED RELEASE TRANSDERMAL at 19:36

## 2024-05-15 RX ADMIN — Medication 1 TABLET(S): at 11:29

## 2024-05-15 RX ADMIN — RALOXIFENE HYDROCHLORIDE 60 MILLIGRAM(S): 60 TABLET, COATED ORAL at 11:29

## 2024-05-15 RX ADMIN — ENOXAPARIN SODIUM 30 MILLIGRAM(S): 100 INJECTION SUBCUTANEOUS at 12:51

## 2024-05-15 RX ADMIN — FAMOTIDINE 20 MILLIGRAM(S): 10 INJECTION INTRAVENOUS at 17:30

## 2024-05-15 NOTE — PROGRESS NOTE ADULT - ASSESSMENT
57yo F w/ pmhx of Down's syndrome, cerebral palsy, seizure disorder presents from [facility] for respiratory distress. Admitted to SDU for severe sepsis and acute hypoxemic respiratory failure likely secondary to recurrent CAP.     # Acute hypoxemic respiratory failure   #Severe sepsis with septic shock poa  #possible recurrent pneumonia /suspected aspiration pna ( despite having peg )   #chronic hypotension   - CXR: possible basilar opacities  - covid/RSV, MSRA, negative; Bl cx with contaminant   - C/w Midodrine 5mg  - target MAP 60 ( Patient always has BP on the softer side )   - blood cx 4/15: staph capitis  - blood cx 4/15 and 4/17: NGTD  - Scopolamine for secretions. aspiration precautions   - 4/22 and 4/23: spiked fever and WBC ;WBC downtrending (10 on 4/26)  - F/u bcx 4/23 -> no growth to date  - CT chest angio w/ contrast 4/24: bilateral opacities   - currently on 4L facemask  - fungitell  >> nEG   - Blood cx >> NGTD 04/28  - Pt is afebrile and wbc count stabilised now.   - given 1x danna and vanco dose, will continue danna 1g q8 --> will monitor off abx for now  - will reduce volume of diet bolus to 250 cc due to suspected gastric distention contributing to aspiration   reduced the volume of bolus feeds given high suspicion for aspiration from hiatal hernia   - 5/10: f/u GI regarding aspiration risk of patient's current G-tube vs possible J-tube placement?  - 5/11: improved tachy. wbc, and bp, monitor on abx for now   - 5/12: afebrile   - 5/13: afebrile, off abx for now, pending NM gastric emptying study to assess for gastroparesis.   - 5/14: Pt is NPO for gastric emptying study, monitoring off abx, afebrile and no wbc count  - 5/15: gastric emptying study is normal. Monitoring off abx (last day abx 5/13 meropenem). Patient noted to have rash over back and left torso - possibly HS reaction to CHG bath? Per ID cannot discontinue CHG baths, treat rash with topical creams (lotion, hydrocotrisone).    #Episodes of Vomiting: resolved as of 5/6/24  - On PEG feed   - Aspiration risk ; C Xray done   - attending spoke to GI for possible J tube but pending gastric emptying study for now.   - gastric emptying study normal     # B/l feet ulcers  - No abscess/cellulitis  - colonized with ESBL strain  - Off loading to prevent pressure sores   - wound care following     #seizure disorder, cont meds   #Down syndrome  #Nonverbal bedbound  # PEG tube   - c/w home meds    #Progress Note Handoff    Pending : dc planning  code status: dnr, dni . poor prognosis

## 2024-05-15 NOTE — PROGRESS NOTE ADULT - SUBJECTIVE AND OBJECTIVE BOX
24H events:    Patient is a 58y old Female who presents with a chief complaint of Pneumonia (14 May 2024 13:51)    Primary diagnosis of Acute sepsis      Day 1:  Day 2:  Day 3:     Today is hospital day 30d. This morning patient was seen and examined at bedside, resting comfortably in bed.    No acute or major events overnight.    Code Status:    Family communication:  Contact date:  Name of person contacted:  Relationship to patient:  Communication details:  What matters most:    PAST MEDICAL & SURGICAL HISTORY  Down syndrome    Osteoporosis    Mild anemia    Neuropathy    S/P debridement  of R hip on 3/2/21      SOCIAL HISTORY:  Social History:      ALLERGIES:  No Known Allergies    MEDICATIONS:  STANDING MEDICATIONS  albuterol/ipratropium for Nebulization 3 milliLiter(s) Nebulizer every 6 hours  AQUAPHOR (petrolatum Ointment) 1 Application(s) Topical two times a day  artificial  tears Solution 1 Drop(s) Both EYES two times a day  calcium carbonate   1250 mG (OsCal) 1 Tablet(s) Oral daily  chlorhexidine 2% Cloths 1 Application(s) Topical daily  enoxaparin Injectable 30 milliGRAM(s) SubCutaneous every 24 hours  famotidine    Tablet 20 milliGRAM(s) Oral two times a day  gabapentin Solution 300 milliGRAM(s) Oral two times a day  hydrocortisone 1% Cream 1 Application(s) Topical daily  levETIRAcetam  Solution 750 milliGRAM(s) Oral two times a day  midodrine 5 milliGRAM(s) Oral every 8 hours  raloxifene 60 milliGRAM(s) Oral daily  scopolamine 1 mG/72 Hr(s) Patch 1 Patch Transdermal every 72 hours  senna 2 Tablet(s) Oral at bedtime  silver sulfADIAZINE 1% Cream 1 Application(s) Topical two times a day    PRN MEDICATIONS  acetaminophen     Tablet .. 650 milliGRAM(s) Oral every 6 hours PRN  aluminum hydroxide/magnesium hydroxide/simethicone Suspension 30 milliLiter(s) Oral every 4 hours PRN  ibuprofen  Tablet. 400 milliGRAM(s) Oral every 6 hours PRN  melatonin 3 milliGRAM(s) Oral at bedtime PRN  ondansetron Injectable 4 milliGRAM(s) IV Push every 8 hours PRN    VITALS:   T(F): 98.4  HR: 99  BP: 86/54  RR: 18  SpO2: 96%    PHYSICAL EXAM:  GENERAL:   (  ) NAD, lying in bed comfortably     (  ) obtunded     (  ) lethargic     (  ) somnolent    HEAD:   (x  ) Atraumatic     (  ) hematoma     (  ) laceration (specify location:       )     NECK:  ( x ) Supple     (  ) neck stiffness     (  ) nuchal rigidity     (  )  no JVD     (  ) JVD present ( -- cm)    HEART:  Rate -->     (x  ) normal rate     (  ) bradycardic     (  ) tachycardic  Rhythm -->     (x  ) regular     (  ) regularly irregular     (  ) irregularly irregular  Murmurs -->     (  ) normal s1s2     (  ) systolic murmur     (  ) diastolic murmur     (  ) continuous murmur      (  ) S3 present     (  ) S4 present    LUNGS:   (x  )Unlabored respirations     (  ) tachypnea  ( x ) B/L air entry     (  ) decreased breath sounds in:  (location     )    (  ) no adventitious sound     (  ) crackles     (  ) wheezing      (  ) rhonchi      (specify location:       )  (  ) chest wall tenderness (specify location:       )    ABDOMEN:   (x  ) Soft     (  ) tense   |   (x  ) nondistended     (  ) distended   |   (  ) +BS     (  ) hypoactive bowel sounds     (  ) hyperactive bowel sounds  ( x ) nontender     (  ) RUQ tenderness     (  ) RLQ tenderness     (  ) LLQ tenderness     (  ) epigastric tenderness     (  ) diffuse tenderness  (  ) Splenomegaly      (  ) Hepatomegaly      (  ) Jaundice     (  ) ecchymosis     EXTREMITIES: extremities contracted   (  ) Normal     (  ) Rash     (  ) ecchymosis     (  ) varicose veins      (  ) pitting edema     (  ) non-pitting edema   (  ) ulceration     (  ) gangrene:     (location:     )    NERVOUS SYSTEM:  awake, alert, nonverbal does not answer questions, does not follow commands  (  ) A&Ox3     (  ) confused     (  ) lethargic  CN II-XII:     (  ) Intact     (  ) deficits found     (Specify:     )   Upper extremities:     (  ) no sensorimotor deficits     (  ) weakness     (  ) loss of proprioception/vibration     (  ) loss of touch/temperature (specify:    )  Lower extremities:     (  ) no sensorimotor deficits     (  ) weakness     (  ) loss of proprioception/vibration     (  ) loss of touch/temperature (specify:    )    SKIN: Erythematous scaly rash over back and left torso   (  ) No rashes or lesions     (  ) maculopapular rash     (  ) pustules     (  ) vesicles     (  ) ulcer     (  ) ecchymosis     (specify location:     )    AMPAC score:    (  ) Indwelling Madsen Catheter:   Date insterted:    Reason (  ) Critical illness     (  ) urinary retention    (  ) Accurate Ins/Outs Monitoring     (  ) CMO patient    (  ) Central Line:   Date inserted:  Location: (  ) Right IJ     (  ) Left IJ     (  ) Right Fem     (  ) Left Fem    (  ) SPC        (  ) pigtail       (  ) PEG tube       (  ) colostomy       (  ) jejunostomy  (  ) U-Dall    LABS:                        9.5    10.75 )-----------( 579      ( 15 May 2024 07:02 )             30.8     05-15    140  |  99  |  12  ----------------------------<  151<H>  4.7   |  30  |  <0.5<L>    Ca    9.2      15 May 2024 07:02        Urinalysis Basic - ( 15 May 2024 07:02 )    Color: x / Appearance: x / SG: x / pH: x  Gluc: 151 mg/dL / Ketone: x  / Bili: x / Urobili: x   Blood: x / Protein: x / Nitrite: x   Leuk Esterase: x / RBC: x / WBC x   Sq Epi: x / Non Sq Epi: x / Bacteria: x                RADIOLOGY:

## 2024-05-16 LAB
ANION GAP SERPL CALC-SCNC: 11 MMOL/L — SIGNIFICANT CHANGE UP (ref 7–14)
BASOPHILS # BLD AUTO: 0.09 K/UL — SIGNIFICANT CHANGE UP (ref 0–0.2)
BASOPHILS NFR BLD AUTO: 0.8 % — SIGNIFICANT CHANGE UP (ref 0–1)
BUN SERPL-MCNC: 14 MG/DL — SIGNIFICANT CHANGE UP (ref 10–20)
CALCIUM SERPL-MCNC: 9.3 MG/DL — SIGNIFICANT CHANGE UP (ref 8.4–10.5)
CHLORIDE SERPL-SCNC: 96 MMOL/L — LOW (ref 98–110)
CO2 SERPL-SCNC: 31 MMOL/L — SIGNIFICANT CHANGE UP (ref 17–32)
CREAT SERPL-MCNC: 0.5 MG/DL — LOW (ref 0.7–1.5)
EGFR: 109 ML/MIN/1.73M2 — SIGNIFICANT CHANGE UP
EOSINOPHIL # BLD AUTO: 0.3 K/UL — SIGNIFICANT CHANGE UP (ref 0–0.7)
EOSINOPHIL NFR BLD AUTO: 2.6 % — SIGNIFICANT CHANGE UP (ref 0–8)
GLUCOSE BLDC GLUCOMTR-MCNC: 117 MG/DL — HIGH (ref 70–99)
GLUCOSE BLDC GLUCOMTR-MCNC: 118 MG/DL — HIGH (ref 70–99)
GLUCOSE BLDC GLUCOMTR-MCNC: 135 MG/DL — HIGH (ref 70–99)
GLUCOSE BLDC GLUCOMTR-MCNC: 148 MG/DL — HIGH (ref 70–99)
GLUCOSE SERPL-MCNC: 137 MG/DL — HIGH (ref 70–99)
HCT VFR BLD CALC: 30.2 % — LOW (ref 37–47)
HGB BLD-MCNC: 9.3 G/DL — LOW (ref 12–16)
IMM GRANULOCYTES NFR BLD AUTO: 0.5 % — HIGH (ref 0.1–0.3)
LYMPHOCYTES # BLD AUTO: 18.3 % — LOW (ref 20.5–51.1)
LYMPHOCYTES # BLD AUTO: 2.07 K/UL — SIGNIFICANT CHANGE UP (ref 1.2–3.4)
MAGNESIUM SERPL-MCNC: 2.4 MG/DL — SIGNIFICANT CHANGE UP (ref 1.8–2.4)
MCHC RBC-ENTMCNC: 29.1 PG — SIGNIFICANT CHANGE UP (ref 27–31)
MCHC RBC-ENTMCNC: 30.8 G/DL — LOW (ref 32–37)
MCV RBC AUTO: 94.4 FL — SIGNIFICANT CHANGE UP (ref 81–99)
MONOCYTES # BLD AUTO: 0.55 K/UL — SIGNIFICANT CHANGE UP (ref 0.1–0.6)
MONOCYTES NFR BLD AUTO: 4.9 % — SIGNIFICANT CHANGE UP (ref 1.7–9.3)
NEUTROPHILS # BLD AUTO: 8.27 K/UL — HIGH (ref 1.4–6.5)
NEUTROPHILS NFR BLD AUTO: 72.9 % — SIGNIFICANT CHANGE UP (ref 42.2–75.2)
NRBC # BLD: 0 /100 WBCS — SIGNIFICANT CHANGE UP (ref 0–0)
PLATELET # BLD AUTO: 562 K/UL — HIGH (ref 130–400)
PMV BLD: 10.2 FL — SIGNIFICANT CHANGE UP (ref 7.4–10.4)
POTASSIUM SERPL-MCNC: 5 MMOL/L — SIGNIFICANT CHANGE UP (ref 3.5–5)
POTASSIUM SERPL-SCNC: 5 MMOL/L — SIGNIFICANT CHANGE UP (ref 3.5–5)
RBC # BLD: 3.2 M/UL — LOW (ref 4.2–5.4)
RBC # FLD: 21.2 % — HIGH (ref 11.5–14.5)
SODIUM SERPL-SCNC: 138 MMOL/L — SIGNIFICANT CHANGE UP (ref 135–146)
WBC # BLD: 11.34 K/UL — HIGH (ref 4.8–10.8)
WBC # FLD AUTO: 11.34 K/UL — HIGH (ref 4.8–10.8)

## 2024-05-16 PROCEDURE — 99232 SBSQ HOSP IP/OBS MODERATE 35: CPT

## 2024-05-16 RX ADMIN — Medication 1 APPLICATION(S): at 13:04

## 2024-05-16 RX ADMIN — Medication 1 TABLET(S): at 13:04

## 2024-05-16 RX ADMIN — GABAPENTIN 300 MILLIGRAM(S): 400 CAPSULE ORAL at 17:23

## 2024-05-16 RX ADMIN — Medication 1 APPLICATION(S): at 17:24

## 2024-05-16 RX ADMIN — Medication 1 DROP(S): at 05:43

## 2024-05-16 RX ADMIN — FAMOTIDINE 20 MILLIGRAM(S): 10 INJECTION INTRAVENOUS at 05:42

## 2024-05-16 RX ADMIN — MIDODRINE HYDROCHLORIDE 5 MILLIGRAM(S): 2.5 TABLET ORAL at 05:42

## 2024-05-16 RX ADMIN — ENOXAPARIN SODIUM 30 MILLIGRAM(S): 100 INJECTION SUBCUTANEOUS at 13:04

## 2024-05-16 RX ADMIN — Medication 3 MILLILITER(S): at 19:25

## 2024-05-16 RX ADMIN — RALOXIFENE HYDROCHLORIDE 60 MILLIGRAM(S): 60 TABLET, COATED ORAL at 13:04

## 2024-05-16 RX ADMIN — MIDODRINE HYDROCHLORIDE 5 MILLIGRAM(S): 2.5 TABLET ORAL at 22:08

## 2024-05-16 RX ADMIN — MIDODRINE HYDROCHLORIDE 5 MILLIGRAM(S): 2.5 TABLET ORAL at 13:05

## 2024-05-16 RX ADMIN — LEVETIRACETAM 750 MILLIGRAM(S): 250 TABLET, FILM COATED ORAL at 17:23

## 2024-05-16 RX ADMIN — SCOPALAMINE 1 PATCH: 1 PATCH, EXTENDED RELEASE TRANSDERMAL at 18:01

## 2024-05-16 RX ADMIN — LEVETIRACETAM 750 MILLIGRAM(S): 250 TABLET, FILM COATED ORAL at 05:43

## 2024-05-16 RX ADMIN — FAMOTIDINE 20 MILLIGRAM(S): 10 INJECTION INTRAVENOUS at 17:23

## 2024-05-16 RX ADMIN — SENNA PLUS 2 TABLET(S): 8.6 TABLET ORAL at 22:08

## 2024-05-16 RX ADMIN — Medication 1 APPLICATION(S): at 05:40

## 2024-05-16 RX ADMIN — GABAPENTIN 300 MILLIGRAM(S): 400 CAPSULE ORAL at 05:41

## 2024-05-16 RX ADMIN — Medication 1 DROP(S): at 17:24

## 2024-05-16 NOTE — PROGRESS NOTE ADULT - SUBJECTIVE AND OBJECTIVE BOX
24H events:    Patient is a 58y old Female who presents with a chief complaint of Pneumonia (15 May 2024 11:48)    Primary diagnosis of Acute sepsis    Family communication:  Contact date:  Name of person contacted:  Relationship to patient:  Communication details:  What matters most:    PAST MEDICAL & SURGICAL HISTORY  Down syndrome    Osteoporosis    Mild anemia    Neuropathy    S/P debridement  of R hip on 3/2/21    SOCIAL HISTORY:  Social History:    ALLERGIES:  No Known Allergies    MEDICATIONS:  STANDING MEDICATIONS  albuterol/ipratropium for Nebulization 3 milliLiter(s) Nebulizer every 6 hours  AQUAPHOR (petrolatum Ointment) 1 Application(s) Topical two times a day  artificial  tears Solution 1 Drop(s) Both EYES two times a day  calcium carbonate   1250 mG (OsCal) 1 Tablet(s) Oral daily  enoxaparin Injectable 30 milliGRAM(s) SubCutaneous every 24 hours  famotidine    Tablet 20 milliGRAM(s) Oral two times a day  gabapentin Solution 300 milliGRAM(s) Oral two times a day  hydrocortisone 1% Cream 1 Application(s) Topical daily  levETIRAcetam  Solution 750 milliGRAM(s) Oral two times a day  midodrine 5 milliGRAM(s) Oral every 8 hours  raloxifene 60 milliGRAM(s) Oral daily  scopolamine 1 mG/72 Hr(s) Patch 1 Patch Transdermal every 72 hours  senna 2 Tablet(s) Oral at bedtime    PRN MEDICATIONS  melatonin 3 milliGRAM(s) Oral at bedtime PRN  ondansetron Injectable 4 milliGRAM(s) IV Push every 8 hours PRN    VITALS:   T(F): 97.5  HR: 67  BP: 99/58  RR: 16  SpO2: 95%    PHYSICAL EXAM:  GENERAL:   ( x ) NAD, lying in bed comfortably     (  ) obtunded     (  ) lethargic     (  ) somnolent    HEAD:   (x  ) Atraumatic     (  ) hematoma     (  ) laceration (specify location:       )     NECK:  (x  ) Supple     (  ) neck stiffness     (  ) nuchal rigidity     (  )  no JVD     (  ) JVD present ( -- cm)    HEART:  Rate -->     ( x ) normal rate     (  ) bradycardic     (  ) tachycardic  Rhythm -->     ( x ) regular     (  ) regularly irregular     (  ) irregularly irregular  Murmurs -->     (  ) normal s1s2     (  ) systolic murmur     (  ) diastolic murmur     (  ) continuous murmur      (  ) S3 present     (  ) S4 present    LUNGS:   ( x )Unlabored respirations     (  ) tachypnea  ( x ) B/L air entry     (  ) decreased breath sounds in:  (location     )    (  ) no adventitious sound     (  ) crackles     (  ) wheezing      (  ) rhonchi      (specify location:       )  (  ) chest wall tenderness (specify location:       )    ABDOMEN:   ( x ) Soft     (  ) tense   |   ( x ) nondistended     (  ) distended   |   (  ) +BS     (  ) hypoactive bowel sounds     (  ) hyperactive bowel sounds  (  ) nontender     (  ) RUQ tenderness     (  ) RLQ tenderness     (  ) LLQ tenderness     (  ) epigastric tenderness     (  ) diffuse tenderness  (  ) Splenomegaly      (  ) Hepatomegaly      (  ) Jaundice     (  ) ecchymosis     EXTREMITIES:  ( x ) Normal     (  ) Rash     (  ) ecchymosis     (  ) varicose veins      (  ) pitting edema     (  ) non-pitting edema   (  ) ulceration     (  ) gangrene:     (location:     )    LABS:                        9.3    11.34 )-----------( 562      ( 16 May 2024 06:18 )             30.2     05-16    138  |  96<L>  |  14  ----------------------------<  137<H>  5.0   |  31  |  0.5<L>    Ca    9.3      16 May 2024 06:18  Mg     2.4     05-16    Urinalysis Basic - ( 16 May 2024 06:18 )    Color: x / Appearance: x / SG: x / pH: x  Gluc: 137 mg/dL / Ketone: x  / Bili: x / Urobili: x   Blood: x / Protein: x / Nitrite: x   Leuk Esterase: x / RBC: x / WBC x   Sq Epi: x / Non Sq Epi: x / Bacteria: x

## 2024-05-16 NOTE — PROGRESS NOTE ADULT - ASSESSMENT
57yo F w/ pmhx of Down's syndrome, cerebral palsy, seizure disorder presents from [facility] for respiratory distress. Admitted to SDU for severe sepsis and acute hypoxemic respiratory failure likely secondary to recurrent CAP.     # Acute hypoxemic respiratory failure   #Severe sepsis with septic shock poa  #possible recurrent pneumonia /suspected aspiration pna ( despite having peg )   #chronic hypotension   - CXR: possible basilar opacities  - covid/RSV, MSRA, negative; Bl cx with contaminant   - C/w Midodrine 5mg  - target MAP 60 ( Patient always has BP on the softer side )   - blood cx 4/15: staph capitis  - blood cx 4/15 and 4/17: NGTD  - Scopolamine for secretions. aspiration precautions   - 4/22 and 4/23: spiked fever and WBC ;WBC downtrending (10 on 4/26)  - F/u bcx 4/23 -> no growth to date  - CT chest angio w/ contrast 4/24: bilateral opacities   - currently on 4L facemask  - fungitell  >> nEG   - Blood cx >> NGTD 04/28  - Pt is afebrile and wbc count stabilised now.   - given 1x danna and vanco dose, will continue danna 1g q8 --> will monitor off abx for now  - will reduce volume of diet bolus to 250 cc due to suspected gastric distention contributing to aspiration   reduced the volume of bolus feeds given high suspicion for aspiration from hiatal hernia   - 5/10: f/u GI regarding aspiration risk of patient's current G-tube vs possible J-tube placement?  - 5/11: improved tachy. wbc, and bp, monitor on abx for now   - 5/12: afebrile   - 5/13: afebrile, off abx for now, pending NM gastric emptying study to assess for gastroparesis.   - 5/14: Pt is NPO for gastric emptying study, monitoring off abx, afebrile and no wbc count  - 5/15: gastric emptying study is normal. Monitoring off abx (last day abx 5/13 meropenem). Patient noted to have rash over back and left torso - possibly HS reaction to CHG bath? Per ID cannot discontinue CHG baths, treat rash with topical creams (lotion, hydrocotrisone).  - 5/16: Stopped CHG, pts rash is improving.    #Episodes of Vomiting: resolved as of 5/6/24  - On PEG feed   - Aspiration risk ; C Xray done   - attending spoke to GI for possible J tube but pending gastric emptying study for now.   - gastric emptying study normal     # B/l feet ulcers  - No abscess/cellulitis  - colonized with ESBL strain  - Off loading to prevent pressure sores   - wound care following     #seizure disorder, cont meds   #Down syndrome  #Nonverbal bedbound  # PEG tube   - c/w home meds    #Progress Note Handoff    Pending : dc planning,  meeting on monday   code status: dnr, dni . poor prognosis

## 2024-05-17 LAB
ANION GAP SERPL CALC-SCNC: 12 MMOL/L — SIGNIFICANT CHANGE UP (ref 7–14)
BUN SERPL-MCNC: 12 MG/DL — SIGNIFICANT CHANGE UP (ref 10–20)
CALCIUM SERPL-MCNC: 9.5 MG/DL — SIGNIFICANT CHANGE UP (ref 8.4–10.5)
CHLORIDE SERPL-SCNC: 98 MMOL/L — SIGNIFICANT CHANGE UP (ref 98–110)
CO2 SERPL-SCNC: 29 MMOL/L — SIGNIFICANT CHANGE UP (ref 17–32)
CREAT SERPL-MCNC: 0.6 MG/DL — LOW (ref 0.7–1.5)
EGFR: 105 ML/MIN/1.73M2 — SIGNIFICANT CHANGE UP
GLUCOSE BLDC GLUCOMTR-MCNC: 117 MG/DL — HIGH (ref 70–99)
GLUCOSE BLDC GLUCOMTR-MCNC: 124 MG/DL — HIGH (ref 70–99)
GLUCOSE BLDC GLUCOMTR-MCNC: 137 MG/DL — HIGH (ref 70–99)
GLUCOSE BLDC GLUCOMTR-MCNC: 192 MG/DL — HIGH (ref 70–99)
GLUCOSE SERPL-MCNC: 132 MG/DL — HIGH (ref 70–99)
HCT VFR BLD CALC: 29.2 % — LOW (ref 37–47)
HGB BLD-MCNC: 9.2 G/DL — LOW (ref 12–16)
MCHC RBC-ENTMCNC: 29.3 PG — SIGNIFICANT CHANGE UP (ref 27–31)
MCHC RBC-ENTMCNC: 31.5 G/DL — LOW (ref 32–37)
MCV RBC AUTO: 93 FL — SIGNIFICANT CHANGE UP (ref 81–99)
NRBC # BLD: 0 /100 WBCS — SIGNIFICANT CHANGE UP (ref 0–0)
PLATELET # BLD AUTO: 549 K/UL — HIGH (ref 130–400)
PMV BLD: 9.8 FL — SIGNIFICANT CHANGE UP (ref 7.4–10.4)
POTASSIUM SERPL-MCNC: 4.6 MMOL/L — SIGNIFICANT CHANGE UP (ref 3.5–5)
POTASSIUM SERPL-SCNC: 4.6 MMOL/L — SIGNIFICANT CHANGE UP (ref 3.5–5)
RBC # BLD: 3.14 M/UL — LOW (ref 4.2–5.4)
RBC # FLD: 21.2 % — HIGH (ref 11.5–14.5)
SODIUM SERPL-SCNC: 139 MMOL/L — SIGNIFICANT CHANGE UP (ref 135–146)
WBC # BLD: 11.09 K/UL — HIGH (ref 4.8–10.8)
WBC # FLD AUTO: 11.09 K/UL — HIGH (ref 4.8–10.8)

## 2024-05-17 PROCEDURE — 99232 SBSQ HOSP IP/OBS MODERATE 35: CPT

## 2024-05-17 RX ADMIN — Medication 1 APPLICATION(S): at 05:37

## 2024-05-17 RX ADMIN — Medication 3 MILLILITER(S): at 20:23

## 2024-05-17 RX ADMIN — ENOXAPARIN SODIUM 30 MILLIGRAM(S): 100 INJECTION SUBCUTANEOUS at 13:31

## 2024-05-17 RX ADMIN — FAMOTIDINE 20 MILLIGRAM(S): 10 INJECTION INTRAVENOUS at 05:36

## 2024-05-17 RX ADMIN — SCOPALAMINE 1 PATCH: 1 PATCH, EXTENDED RELEASE TRANSDERMAL at 19:29

## 2024-05-17 RX ADMIN — SENNA PLUS 2 TABLET(S): 8.6 TABLET ORAL at 21:25

## 2024-05-17 RX ADMIN — Medication 3 MILLIGRAM(S): at 21:25

## 2024-05-17 RX ADMIN — MIDODRINE HYDROCHLORIDE 5 MILLIGRAM(S): 2.5 TABLET ORAL at 21:25

## 2024-05-17 RX ADMIN — GABAPENTIN 300 MILLIGRAM(S): 400 CAPSULE ORAL at 05:35

## 2024-05-17 RX ADMIN — Medication 1 DROP(S): at 18:08

## 2024-05-17 RX ADMIN — Medication 1 DROP(S): at 05:36

## 2024-05-17 RX ADMIN — MIDODRINE HYDROCHLORIDE 5 MILLIGRAM(S): 2.5 TABLET ORAL at 05:36

## 2024-05-17 RX ADMIN — Medication 1 APPLICATION(S): at 18:08

## 2024-05-17 RX ADMIN — GABAPENTIN 300 MILLIGRAM(S): 400 CAPSULE ORAL at 18:07

## 2024-05-17 RX ADMIN — Medication 3 MILLILITER(S): at 03:56

## 2024-05-17 RX ADMIN — Medication 3 MILLILITER(S): at 07:38

## 2024-05-17 RX ADMIN — LEVETIRACETAM 750 MILLIGRAM(S): 250 TABLET, FILM COATED ORAL at 05:36

## 2024-05-17 RX ADMIN — Medication 1 TABLET(S): at 11:53

## 2024-05-17 RX ADMIN — FAMOTIDINE 20 MILLIGRAM(S): 10 INJECTION INTRAVENOUS at 18:07

## 2024-05-17 RX ADMIN — LEVETIRACETAM 750 MILLIGRAM(S): 250 TABLET, FILM COATED ORAL at 18:07

## 2024-05-17 RX ADMIN — Medication 3 MILLILITER(S): at 15:04

## 2024-05-17 RX ADMIN — RALOXIFENE HYDROCHLORIDE 60 MILLIGRAM(S): 60 TABLET, COATED ORAL at 11:53

## 2024-05-17 RX ADMIN — MIDODRINE HYDROCHLORIDE 5 MILLIGRAM(S): 2.5 TABLET ORAL at 13:32

## 2024-05-17 NOTE — PROGRESS NOTE ADULT - ASSESSMENT
59yo F w/ pmhx of Down's syndrome, cerebral palsy, seizure disorder presents from [facility] for respiratory distress. Admitted to SDU for severe sepsis and acute hypoxemic respiratory failure likely secondary to recurrent CAP.     # Acute hypoxemic respiratory failure   #Severe sepsis with septic shock poa  #possible recurrent pneumonia /suspected aspiration pna ( despite having peg )   #chronic hypotension   - CXR: possible basilar opacities  - covid/RSV, MSRA, negative; Bl cx with contaminant   - C/w Midodrine 5mg  - target MAP 60 ( Patient always has BP on the softer side )   - blood cx 4/15: staph capitis  - blood cx 4/15 and 4/17: NGTD  - Scopolamine for secretions. aspiration precautions   - 4/22 and 4/23: spiked fever and WBC ;WBC downtrending (10 on 4/26)  - F/u bcx 4/23 -> no growth to date  - CT chest angio w/ contrast 4/24: bilateral opacities   - currently on 4L facemask  - fungitell  >> nEG   - Blood cx >> NGTD 04/28  - Pt is afebrile and wbc count stabilised now.   - given 1x danna and vanco dose, will continue danna 1g q8 --> will monitor off abx for now  - will reduce volume of diet bolus to 250 cc due to suspected gastric distention contributing to aspiration   reduced the volume of bolus feeds given high suspicion for aspiration from hiatal hernia   - 5/10: f/u GI regarding aspiration risk of patient's current G-tube vs possible J-tube placement?  - 5/11: improved tachy. wbc, and bp, monitor on abx for now   - 5/12: afebrile   - 5/13: afebrile, off abx for now, pending NM gastric emptying study to assess for gastroparesis.   - 5/14: Pt is NPO for gastric emptying study, monitoring off abx, afebrile and no wbc count  - 5/15: gastric emptying study is normal. Monitoring off abx (last day abx 5/13 meropenem). Patient noted to have rash over back and left torso - possibly HS reaction to CHG bath? Per ID cannot discontinue CHG baths, treat rash with topical creams (lotion, hydrocotrisone).  - 5/16: Stopped CHG, pts rash is improving.  - 5/17: rash improving, DC planning.     #Episodes of Vomiting: resolved as of 5/6/24  - On PEG feed   - Aspiration risk ; C Xray done   - attending spoke to GI for possible J tube but pending gastric emptying study for now.   - gastric emptying study normal     # B/l feet ulcers  - No abscess/cellulitis  - colonized with ESBL strain  - Off loading to prevent pressure sores   - wound care following     #seizure disorder, cont meds   #Down syndrome  #Nonverbal bedbound  # PEG tube   - c/w home meds    #Progress Note Handoff    Pending : dc planning,  meeting on monday   code status: dnr, dni . poor prognosis

## 2024-05-17 NOTE — PROGRESS NOTE ADULT - SUBJECTIVE AND OBJECTIVE BOX
24H events:    Patient is a 58y old Female who presents with a chief complaint of Pneumonia (16 May 2024 11:23)    Primary diagnosis of Acute sepsis    Family communication:  Contact date:  Name of person contacted:  Relationship to patient:  Communication details:  What matters most:    PAST MEDICAL & SURGICAL HISTORY  Down syndrome    Osteoporosis    Mild anemia    Neuropathy    S/P debridement  of R hip on 3/2/21    SOCIAL HISTORY:  Social History:    ALLERGIES:  chlorhexidine containing compounds (Rash (Mild to Mod))    MEDICATIONS:  STANDING MEDICATIONS  albuterol/ipratropium for Nebulization 3 milliLiter(s) Nebulizer every 6 hours  AQUAPHOR (petrolatum Ointment) 1 Application(s) Topical two times a day  artificial  tears Solution 1 Drop(s) Both EYES two times a day  calcium carbonate   1250 mG (OsCal) 1 Tablet(s) Oral daily  enoxaparin Injectable 30 milliGRAM(s) SubCutaneous every 24 hours  famotidine    Tablet 20 milliGRAM(s) Oral two times a day  gabapentin Solution 300 milliGRAM(s) Oral two times a day  levETIRAcetam  Solution 750 milliGRAM(s) Oral two times a day  midodrine 5 milliGRAM(s) Oral every 8 hours  raloxifene 60 milliGRAM(s) Oral daily  scopolamine 1 mG/72 Hr(s) Patch 1 Patch Transdermal every 72 hours  senna 2 Tablet(s) Oral at bedtime    PRN MEDICATIONS  melatonin 3 milliGRAM(s) Oral at bedtime PRN  ondansetron Injectable 4 milliGRAM(s) IV Push every 8 hours PRN    VITALS:   T(F): 98.2  HR: 109  BP: 101/61  RR: 18  SpO2: 96%    PHYSICAL EXAM:  GENERAL:   ( x ) NAD, lying in bed comfortably     (  ) obtunded     (  ) lethargic     (  ) somnolent    HEAD:   (x  ) Atraumatic     (  ) hematoma     (  ) laceration (specify location:       )     NECK:  ( x ) Supple     (  ) neck stiffness     (  ) nuchal rigidity     (  )  no JVD     (  ) JVD present ( -- cm)    HEART:  Rate -->     (x  ) normal rate     (  ) bradycardic     (  ) tachycardic  Rhythm -->     ( x ) regular     (  ) regularly irregular     (  ) irregularly irregular  Murmurs -->     (  ) normal s1s2     (  ) systolic murmur     (  ) diastolic murmur     (  ) continuous murmur      (  ) S3 present     (  ) S4 present    LUNGS:   (x  )Unlabored respirations     (  ) tachypnea  ( x ) B/L air entry     (  ) decreased breath sounds in:  (location     )    (  ) no adventitious sound     (  ) crackles     (  ) wheezing      (  ) rhonchi      (specify location:       )  (  ) chest wall tenderness (specify location:       )    ABDOMEN:   (x  ) Soft     (  ) tense   |   (x  ) nondistended     (  ) distended   |   (  ) +BS     (  ) hypoactive bowel sounds     (  ) hyperactive bowel sounds  (  ) nontender     (  ) RUQ tenderness     (  ) RLQ tenderness     (  ) LLQ tenderness     (  ) epigastric tenderness     (  ) diffuse tenderness  (  ) Splenomegaly      (  ) Hepatomegaly      (  ) Jaundice     (  ) ecchymosis     EXTREMITIES:  (x  ) Normal     (  ) Rash     (  ) ecchymosis     (  ) varicose veins      (  ) pitting edema     (  ) non-pitting edema   (  ) ulceration     (  ) gangrene:     (location:     )    NERVOUS SYSTEM:    (  ) A&Ox3     (  ) confused     (  ) lethargic  CN II-XII:     (  ) Intact     (  ) deficits found     (Specify:     )   Upper extremities:     (  ) no sensorimotor deficits     (  ) weakness     (  ) loss of proprioception/vibration     (  ) loss of touch/temperature (specify:    )  Lower extremities:     (  ) no sensorimotor deficits     (  ) weakness     (  ) loss of proprioception/vibration     (  ) loss of touch/temperature (specify:    )    LABS:                        9.2    11.09 )-----------( 549      ( 17 May 2024 06:33 )             29.2     05-17    139  |  98  |  12  ----------------------------<  132<H>  4.6   |  29  |  0.6<L>    Ca    9.5      17 May 2024 06:33  Mg     2.4     05-16    Urinalysis Basic - ( 17 May 2024 06:33 )    Color: x / Appearance: x / SG: x / pH: x  Gluc: 132 mg/dL / Ketone: x  / Bili: x / Urobili: x   Blood: x / Protein: x / Nitrite: x   Leuk Esterase: x / RBC: x / WBC x   Sq Epi: x / Non Sq Epi: x / Bacteria: x

## 2024-05-17 NOTE — PROGRESS NOTE ADULT - SUBJECTIVE AND OBJECTIVE BOX
pt seen and examined.     My notes supersede resident's notes in case of discrepancy       ROS: no cp, no sob, no n/v, no fever    Vital Signs Last 24 Hrs  T(C): 36.5 (17 May 2024 13:47), Max: 37.4 (16 May 2024 21:16)  T(F): 97.7 (17 May 2024 13:47), Max: 99.4 (16 May 2024 21:16)  HR: 101 (17 May 2024 13:47) (65 - 109)  BP: 88/57 (17 May 2024 13:47) (85/55 - 111/69)  BP(mean): --  RR: 18 (17 May 2024 05:03) (18 - 18)  SpO2: 93% (17 May 2024 13:47) (93% - 96%)    Parameters below as of 17 May 2024 13:47  Patient On (Oxygen Delivery Method): nasal cannula        physical exam  constitutional NAD, Awake, non verbal , Respiratory  lungs CTA, CVS heart RRR, GI: abdomen Soft NT, ND, BS+, skin: extensive rash on the back and abd area , improved compared to prior,   neuro exam pt is unable to participate     MEDICATIONS  (STANDING):  albuterol/ipratropium for Nebulization 3 milliLiter(s) Nebulizer every 6 hours  AQUAPHOR (petrolatum Ointment) 1 Application(s) Topical two times a day  artificial  tears Solution 1 Drop(s) Both EYES two times a day  calcium carbonate   1250 mG (OsCal) 1 Tablet(s) Oral daily  enoxaparin Injectable 30 milliGRAM(s) SubCutaneous every 24 hours  famotidine    Tablet 20 milliGRAM(s) Oral two times a day  gabapentin Solution 300 milliGRAM(s) Oral two times a day  levETIRAcetam  Solution 750 milliGRAM(s) Oral two times a day  midodrine 5 milliGRAM(s) Oral every 8 hours  raloxifene 60 milliGRAM(s) Oral daily  scopolamine 1 mG/72 Hr(s) Patch 1 Patch Transdermal every 72 hours  senna 2 Tablet(s) Oral at bedtime    MEDICATIONS  (PRN):  melatonin 3 milliGRAM(s) Oral at bedtime PRN Insomnia  ondansetron Injectable 4 milliGRAM(s) IV Push every 8 hours PRN Nausea and/or Vomiting                        9.2    11.09 )-----------( 549      ( 17 May 2024 06:33 )             29.2     05-17    139  |  98  |  12  ----------------------------<  132<H>  4.6   |  29  |  0.6<L>    Ca    9.5      17 May 2024 06:33  Mg     2.4     05-16      Procalcitonin: 0.59 ng/mL [0.02 - 0.10] (05-06-24 @ 10:50)  Procalcitonin: 0.16 ng/mL [0.02 - 0.10] (05-03-24 @ 18:01)  Procalcitonin, Serum: 6.93 ng/mL [0.02 - 0.10] (04-16-24 @ 07:03)    a/p    59yo F w/ pmhx of Down's syndrome, cerebral palsy, seizure disorder presents from [facility] for respiratory distress. Admitted to SDU for severe sepsis and acute hypoxemic respiratory failure likely secondary to recurrent CAP.     # Acute hypoxemic respiratory failure   #Severe sepsis with septic shock poa  #possible recurrent pneumonia /suspected aspiration pna ( despite having peg )   #chronic hypotension   - covid/RSV, MSRA, negative; Bl cx with contaminant   - C/w Midodrine 5mg  - target MAP 60 ( Patient always has BP on the softer side )   - blood cx 4/15: staph capitis  - blood cx 4/15 and 4/17: NGTD  - Scopolamine for secretions. aspiration precautions   - Palliative care following   - 4/22 and 4/23: spiked fever and WBC ;WBC downtrending (10 on 4/26)  - F/u bcx 4/23 -> no growth to date  - CT chest angio w/ contrast 4/24: bilateral opacities   - currently on 3L NC   -  fungitell  >> nEG   -Blood cx >> NGTD 04/28  - 5/5-5/6:  overnight patient was tachy to 140s, hypotensive (around baseline), febrile to 38.5, given 1L LR bolus and then additional 500cc for elevated lactate of 3  - lactate improved overnight, tachy and febrile this am on 5/7/24, given 500cc bolus, CT showing asp pna,  - given 1x danna and vanco dose,    s/p  Meropenem  1g q8  x mutiple courses.   - will reduce volume of diet bolus to 250 cc due to suspected gastric distention contributing to aspiration   - remains afebrile > 72 hrs and leukocytosis has resolved   reduced the volume of bolus feeds given high suspicion for aspiration from hiatal hernia     # rash, probably contact dermatitis, improving ,   hold chlorhexidine wash, added topical care , monitor     #Episodes of Vomiting, complicated by Aspiration   - recurrent aspiration risk is high for coexisting hiatal hernia   - On PEG feed  , now on lower volume, tolerating   - gastric emptying study, neg     # B/l feet ulcers  - No abscess/cellulitis  - colonized with ESBL strain  - Off loading to prevent pressure sores   - wound care following     #seizure disorder, cont meds   #Down syndrome  #Nonverbal bedbound  # PEG tube   - c/w home meds    #Progress Note Handoff    Pending :  rash to improve, meeting with group home on monday   Family discussion: meeting with group home is being arranged by the    Disposition: group home   code status: dnr, dni .

## 2024-05-18 LAB
ANION GAP SERPL CALC-SCNC: 10 MMOL/L — SIGNIFICANT CHANGE UP (ref 7–14)
BUN SERPL-MCNC: 12 MG/DL — SIGNIFICANT CHANGE UP (ref 10–20)
CALCIUM SERPL-MCNC: 9.3 MG/DL — SIGNIFICANT CHANGE UP (ref 8.4–10.5)
CHLORIDE SERPL-SCNC: 98 MMOL/L — SIGNIFICANT CHANGE UP (ref 98–110)
CO2 SERPL-SCNC: 30 MMOL/L — SIGNIFICANT CHANGE UP (ref 17–32)
CREAT SERPL-MCNC: 0.6 MG/DL — LOW (ref 0.7–1.5)
EGFR: 105 ML/MIN/1.73M2 — SIGNIFICANT CHANGE UP
GLUCOSE BLDC GLUCOMTR-MCNC: 127 MG/DL — HIGH (ref 70–99)
GLUCOSE BLDC GLUCOMTR-MCNC: 128 MG/DL — HIGH (ref 70–99)
GLUCOSE BLDC GLUCOMTR-MCNC: 139 MG/DL — HIGH (ref 70–99)
GLUCOSE BLDC GLUCOMTR-MCNC: 142 MG/DL — HIGH (ref 70–99)
GLUCOSE SERPL-MCNC: 139 MG/DL — HIGH (ref 70–99)
HCT VFR BLD CALC: 31.5 % — LOW (ref 37–47)
HGB BLD-MCNC: 9.5 G/DL — LOW (ref 12–16)
MCHC RBC-ENTMCNC: 29.1 PG — SIGNIFICANT CHANGE UP (ref 27–31)
MCHC RBC-ENTMCNC: 30.2 G/DL — LOW (ref 32–37)
MCV RBC AUTO: 96.3 FL — SIGNIFICANT CHANGE UP (ref 81–99)
NRBC # BLD: 0 /100 WBCS — SIGNIFICANT CHANGE UP (ref 0–0)
PLATELET # BLD AUTO: 560 K/UL — HIGH (ref 130–400)
PMV BLD: 9.9 FL — SIGNIFICANT CHANGE UP (ref 7.4–10.4)
POTASSIUM SERPL-MCNC: 4.7 MMOL/L — SIGNIFICANT CHANGE UP (ref 3.5–5)
POTASSIUM SERPL-SCNC: 4.7 MMOL/L — SIGNIFICANT CHANGE UP (ref 3.5–5)
RBC # BLD: 3.27 M/UL — LOW (ref 4.2–5.4)
RBC # FLD: 21.1 % — HIGH (ref 11.5–14.5)
SODIUM SERPL-SCNC: 138 MMOL/L — SIGNIFICANT CHANGE UP (ref 135–146)
WBC # BLD: 8.98 K/UL — SIGNIFICANT CHANGE UP (ref 4.8–10.8)
WBC # FLD AUTO: 8.98 K/UL — SIGNIFICANT CHANGE UP (ref 4.8–10.8)

## 2024-05-18 PROCEDURE — 99232 SBSQ HOSP IP/OBS MODERATE 35: CPT

## 2024-05-18 RX ORDER — MEROPENEM 1 G/30ML
1000 INJECTION INTRAVENOUS EVERY 8 HOURS
Refills: 0 | Status: COMPLETED | OUTPATIENT
Start: 2024-05-18 | End: 2024-05-25

## 2024-05-18 RX ORDER — ACETAMINOPHEN 500 MG
650 TABLET ORAL EVERY 6 HOURS
Refills: 0 | Status: DISCONTINUED | OUTPATIENT
Start: 2024-05-18 | End: 2024-05-29

## 2024-05-18 RX ADMIN — ENOXAPARIN SODIUM 30 MILLIGRAM(S): 100 INJECTION SUBCUTANEOUS at 13:53

## 2024-05-18 RX ADMIN — RALOXIFENE HYDROCHLORIDE 60 MILLIGRAM(S): 60 TABLET, COATED ORAL at 12:20

## 2024-05-18 RX ADMIN — GABAPENTIN 300 MILLIGRAM(S): 400 CAPSULE ORAL at 17:58

## 2024-05-18 RX ADMIN — FAMOTIDINE 20 MILLIGRAM(S): 10 INJECTION INTRAVENOUS at 17:58

## 2024-05-18 RX ADMIN — SENNA PLUS 2 TABLET(S): 8.6 TABLET ORAL at 21:31

## 2024-05-18 RX ADMIN — SCOPALAMINE 1 PATCH: 1 PATCH, EXTENDED RELEASE TRANSDERMAL at 05:16

## 2024-05-18 RX ADMIN — Medication 650 MILLIGRAM(S): at 12:20

## 2024-05-18 RX ADMIN — Medication 1 APPLICATION(S): at 05:23

## 2024-05-18 RX ADMIN — Medication 3 MILLILITER(S): at 13:00

## 2024-05-18 RX ADMIN — Medication 1 DROP(S): at 17:59

## 2024-05-18 RX ADMIN — Medication 1 TABLET(S): at 12:20

## 2024-05-18 RX ADMIN — GABAPENTIN 300 MILLIGRAM(S): 400 CAPSULE ORAL at 05:18

## 2024-05-18 RX ADMIN — LEVETIRACETAM 750 MILLIGRAM(S): 250 TABLET, FILM COATED ORAL at 17:58

## 2024-05-18 RX ADMIN — Medication 1 APPLICATION(S): at 17:58

## 2024-05-18 RX ADMIN — FAMOTIDINE 20 MILLIGRAM(S): 10 INJECTION INTRAVENOUS at 05:17

## 2024-05-18 RX ADMIN — MEROPENEM 100 MILLIGRAM(S): 1 INJECTION INTRAVENOUS at 23:44

## 2024-05-18 RX ADMIN — MIDODRINE HYDROCHLORIDE 5 MILLIGRAM(S): 2.5 TABLET ORAL at 13:53

## 2024-05-18 RX ADMIN — MIDODRINE HYDROCHLORIDE 5 MILLIGRAM(S): 2.5 TABLET ORAL at 21:31

## 2024-05-18 RX ADMIN — Medication 3 MILLILITER(S): at 21:52

## 2024-05-18 RX ADMIN — Medication 3 MILLILITER(S): at 08:36

## 2024-05-18 RX ADMIN — Medication 1 DROP(S): at 05:23

## 2024-05-18 RX ADMIN — LEVETIRACETAM 750 MILLIGRAM(S): 250 TABLET, FILM COATED ORAL at 05:16

## 2024-05-18 RX ADMIN — MIDODRINE HYDROCHLORIDE 5 MILLIGRAM(S): 2.5 TABLET ORAL at 05:17

## 2024-05-18 NOTE — PROGRESS NOTE ADULT - SUBJECTIVE AND OBJECTIVE BOX
24H events:    Patient is a 58y old Female who presents with a chief complaint of Pneumonia (17 May 2024 14:04)  Primary diagnosis of Acute sepsis  Today is hospital day 33d. This morning patient was seen and examined at bedside, resting comfortably in bed.    No acute or major events overnight.    Code Status:    Family communication:  Contact date:  Name of person contacted:  Relationship to patient:  Communication details:  What matters most:    PAST MEDICAL & SURGICAL HISTORY  Down syndrome    Osteoporosis    Mild anemia    Neuropathy    S/P debridement  of R hip on 3/2/21      SOCIAL HISTORY:  Social History:      ALLERGIES:  chlorhexidine containing compounds (Rash (Mild to Mod))    MEDICATIONS:  STANDING MEDICATIONS  albuterol/ipratropium for Nebulization 3 milliLiter(s) Nebulizer every 6 hours  AQUAPHOR (petrolatum Ointment) 1 Application(s) Topical two times a day  artificial  tears Solution 1 Drop(s) Both EYES two times a day  calcium carbonate   1250 mG (OsCal) 1 Tablet(s) Oral daily  enoxaparin Injectable 30 milliGRAM(s) SubCutaneous every 24 hours  famotidine    Tablet 20 milliGRAM(s) Oral two times a day  gabapentin Solution 300 milliGRAM(s) Oral two times a day  levETIRAcetam  Solution 750 milliGRAM(s) Oral two times a day  midodrine 5 milliGRAM(s) Oral every 8 hours  raloxifene 60 milliGRAM(s) Oral daily  scopolamine 1 mG/72 Hr(s) Patch 1 Patch Transdermal every 72 hours  senna 2 Tablet(s) Oral at bedtime    PRN MEDICATIONS  melatonin 3 milliGRAM(s) Oral at bedtime PRN  ondansetron Injectable 4 milliGRAM(s) IV Push every 8 hours PRN    VITALS:   T(F): 99.9  HR: 103  BP: 103/67  RR: 17  SpO2: 96%    PHYSICAL EXAM:      constitutional NAD, Awake, non verbal ,   Respiratory  lungs CTA,   CVS heart RRR,   GI: abdomen Soft NT, ND, BS+,  skin: extensive rash on the back and abd area, improved compared to prior  neuro exam pt is unable to participate           (  ) Indwelling Madsen Catheter:   Date insterted:    Reason (  ) Critical illness     (  ) urinary retention    (  ) Accurate Ins/Outs Monitoring     (  ) CMO patient    (  ) Central Line:   Date inserted:  Location: (  ) Right IJ     (  ) Left IJ     (  ) Right Fem     (  ) Left Fem    (  ) SPC        (  ) pigtail       (  ) PEG tube       (  ) colostomy       (  ) jejunostomy  (  ) U-Dall    LABS:                        9.5    8.98  )-----------( 560      ( 18 May 2024 08:08 )             31.5     05-18    138  |  98  |  12  ----------------------------<  139<H>  4.7   |  30  |  0.6<L>    Ca    9.3      18 May 2024 08:08        Urinalysis Basic - ( 18 May 2024 08:08 )    Color: x / Appearance: x / SG: x / pH: x  Gluc: 139 mg/dL / Ketone: x  / Bili: x / Urobili: x   Blood: x / Protein: x / Nitrite: x   Leuk Esterase: x / RBC: x / WBC x   Sq Epi: x / Non Sq Epi: x / Bacteria: x                RADIOLOGY:

## 2024-05-18 NOTE — PROGRESS NOTE ADULT - ASSESSMENT
57yo F w/ pmhx of Down's syndrome, cerebral palsy, seizure disorder presents from [facility] for respiratory distress. Admitted to SDU for severe sepsis and acute hypoxemic respiratory failure likely secondary to recurrent CAP.     #Acute hypoxemic respiratory failure   #Severe sepsis with septic shock poa  #possible recurrent pneumonia /suspected aspiration pna ( despite having peg )   #chronic hypotension   - CXR: possible basilar opacities  - covid/RSV, MSRA, negative; Bl cx with contaminant   - C/w Midodrine 5mg  - target MAP 60 ( Patient always has BP on the softer side )   - blood cx 4/15: staph capitis  - blood cx 4/15 and 4/17: NGTD  - Scopolamine for secretions. aspiration precautions   - 4/22 and 4/23: spiked fever and WBC ;WBC downtrending (10 on 4/26)  - F/u bcx 4/23 -> no growth to date  - CT chest angio w/ contrast 4/24: bilateral opacities   - currently on 4L facemask  - fungitell  >> nEG   - Blood cx >> NGTD 04/28  - Pt is afebrile and wbc count stabilised now.   - given 1x danna and vanco dose, will continue danna 1g q8 --> will monitor off abx for now  - will reduce volume of diet bolus to 250 cc due to suspected gastric distention contributing to aspiration   reduced the volume of bolus feeds given high suspicion for aspiration from hiatal hernia   - 5/10: f/u GI regarding aspiration risk of patient's current G-tube vs possible J-tube placement?  - 5/11: improved tachy. wbc, and bp, monitor on abx for now   - 5/12: afebrile   - 5/13: afebrile, off abx for now, pending NM gastric emptying study to assess for gastroparesis.   - 5/14: Pt is NPO for gastric emptying study, monitoring off abx, afebrile and no wbc count  - 5/15: gastric emptying study is normal. Monitoring off abx (last day abx 5/13 meropenem). Patient noted to have rash over back and left torso - possibly HS reaction to CHG bath? Per ID cannot discontinue CHG baths, treat rash with topical creams (lotion, hydrocotrisone).  - 5/16: Stopped CHG, pts rash is improving.  - 5/17: rash improving, DC planning.     #Episodes of Vomiting: resolved as of 5/6/24  - On PEG feed   - Aspiration risk ; C Xray done   - attending spoke to GI for possible J tube but pending gastric emptying study for now.   - gastric emptying study normal     # B/l feet ulcers  - No abscess/cellulitis  - colonized with ESBL strain  - Off loading to prevent pressure sores   - wound care following     #seizure disorder, cont meds   #Down syndrome  #Nonverbal bedbound  # PEG tube   - c/w home meds    #Progress Note Handoff    Pending : dc planning,  meeting on monday   code status: dnr, dni . poor prognosis

## 2024-05-19 LAB
-  CTX-M RESISTANCE MARKER: SIGNIFICANT CHANGE UP
-  ESBL: SIGNIFICANT CHANGE UP
-  STAPHYLOCOCCUS EPIDERMIDIS, METHICILLIN RESISTANT: SIGNIFICANT CHANGE UP
ALBUMIN SERPL ELPH-MCNC: 3 G/DL — LOW (ref 3.5–5.2)
ALP SERPL-CCNC: 101 U/L — SIGNIFICANT CHANGE UP (ref 30–115)
ALT FLD-CCNC: 15 U/L — SIGNIFICANT CHANGE UP (ref 0–41)
ANION GAP SERPL CALC-SCNC: 14 MMOL/L — SIGNIFICANT CHANGE UP (ref 7–14)
AST SERPL-CCNC: 18 U/L — SIGNIFICANT CHANGE UP (ref 0–41)
BASOPHILS # BLD AUTO: 0.03 K/UL — SIGNIFICANT CHANGE UP (ref 0–0.2)
BASOPHILS NFR BLD AUTO: 0.3 % — SIGNIFICANT CHANGE UP (ref 0–1)
BILIRUB SERPL-MCNC: <0.2 MG/DL — SIGNIFICANT CHANGE UP (ref 0.2–1.2)
BUN SERPL-MCNC: 13 MG/DL — SIGNIFICANT CHANGE UP (ref 10–20)
CALCIUM SERPL-MCNC: 9 MG/DL — SIGNIFICANT CHANGE UP (ref 8.4–10.4)
CHLORIDE SERPL-SCNC: 102 MMOL/L — SIGNIFICANT CHANGE UP (ref 98–110)
CO2 SERPL-SCNC: 25 MMOL/L — SIGNIFICANT CHANGE UP (ref 17–32)
CREAT SERPL-MCNC: 0.6 MG/DL — LOW (ref 0.7–1.5)
E FAECALIS DNA BLD POS QL NAA+NON-PROBE: SIGNIFICANT CHANGE UP
E FAECIUM DNA BLD POS QL NAA+NON-PROBE: SIGNIFICANT CHANGE UP
EGFR: 105 ML/MIN/1.73M2 — SIGNIFICANT CHANGE UP
EOSINOPHIL # BLD AUTO: 0.17 K/UL — SIGNIFICANT CHANGE UP (ref 0–0.7)
EOSINOPHIL NFR BLD AUTO: 1.5 % — SIGNIFICANT CHANGE UP (ref 0–8)
GLUCOSE BLDC GLUCOMTR-MCNC: 117 MG/DL — HIGH (ref 70–99)
GLUCOSE SERPL-MCNC: 126 MG/DL — HIGH (ref 70–99)
GRAM STN FLD: ABNORMAL
GRAM STN FLD: ABNORMAL
HCT VFR BLD CALC: 33.6 % — LOW (ref 37–47)
HGB BLD-MCNC: 10.1 G/DL — LOW (ref 12–16)
IMM GRANULOCYTES NFR BLD AUTO: 0.4 % — HIGH (ref 0.1–0.3)
LYMPHOCYTES # BLD AUTO: 1.11 K/UL — LOW (ref 1.2–3.4)
LYMPHOCYTES # BLD AUTO: 9.8 % — LOW (ref 20.5–51.1)
MCHC RBC-ENTMCNC: 29 PG — SIGNIFICANT CHANGE UP (ref 27–31)
MCHC RBC-ENTMCNC: 30.1 G/DL — LOW (ref 32–37)
MCV RBC AUTO: 96.6 FL — SIGNIFICANT CHANGE UP (ref 81–99)
METHOD TYPE: SIGNIFICANT CHANGE UP
MONOCYTES # BLD AUTO: 0.35 K/UL — SIGNIFICANT CHANGE UP (ref 0.1–0.6)
MONOCYTES NFR BLD AUTO: 3.1 % — SIGNIFICANT CHANGE UP (ref 1.7–9.3)
NEUTROPHILS # BLD AUTO: 9.68 K/UL — HIGH (ref 1.4–6.5)
NEUTROPHILS NFR BLD AUTO: 84.9 % — HIGH (ref 42.2–75.2)
NRBC # BLD: 0 /100 WBCS — SIGNIFICANT CHANGE UP (ref 0–0)
PLATELET # BLD AUTO: 564 K/UL — HIGH (ref 130–400)
PMV BLD: 9.8 FL — SIGNIFICANT CHANGE UP (ref 7.4–10.4)
POTASSIUM SERPL-MCNC: 5 MMOL/L — SIGNIFICANT CHANGE UP (ref 3.5–5)
POTASSIUM SERPL-SCNC: 5 MMOL/L — SIGNIFICANT CHANGE UP (ref 3.5–5)
PROT SERPL-MCNC: 6.6 G/DL — SIGNIFICANT CHANGE UP (ref 6–8)
PROTEUS SP DNA BLD POS QL NAA+NON-PROBE: SIGNIFICANT CHANGE UP
RBC # BLD: 3.48 M/UL — LOW (ref 4.2–5.4)
RBC # FLD: 21.3 % — HIGH (ref 11.5–14.5)
SODIUM SERPL-SCNC: 141 MMOL/L — SIGNIFICANT CHANGE UP (ref 135–146)
SPECIMEN SOURCE: SIGNIFICANT CHANGE UP
WBC # BLD: 11.38 K/UL — HIGH (ref 4.8–10.8)
WBC # FLD AUTO: 11.38 K/UL — HIGH (ref 4.8–10.8)

## 2024-05-19 PROCEDURE — 99232 SBSQ HOSP IP/OBS MODERATE 35: CPT

## 2024-05-19 RX ADMIN — SCOPALAMINE 1 PATCH: 1 PATCH, EXTENDED RELEASE TRANSDERMAL at 22:21

## 2024-05-19 RX ADMIN — ENOXAPARIN SODIUM 30 MILLIGRAM(S): 100 INJECTION SUBCUTANEOUS at 13:29

## 2024-05-19 RX ADMIN — MIDODRINE HYDROCHLORIDE 5 MILLIGRAM(S): 2.5 TABLET ORAL at 13:29

## 2024-05-19 RX ADMIN — Medication 1 DROP(S): at 18:03

## 2024-05-19 RX ADMIN — MIDODRINE HYDROCHLORIDE 5 MILLIGRAM(S): 2.5 TABLET ORAL at 22:14

## 2024-05-19 RX ADMIN — GABAPENTIN 300 MILLIGRAM(S): 400 CAPSULE ORAL at 05:55

## 2024-05-19 RX ADMIN — MEROPENEM 100 MILLIGRAM(S): 1 INJECTION INTRAVENOUS at 05:54

## 2024-05-19 RX ADMIN — FAMOTIDINE 20 MILLIGRAM(S): 10 INJECTION INTRAVENOUS at 18:05

## 2024-05-19 RX ADMIN — Medication 1 APPLICATION(S): at 18:03

## 2024-05-19 RX ADMIN — Medication 3 MILLILITER(S): at 19:45

## 2024-05-19 RX ADMIN — LEVETIRACETAM 750 MILLIGRAM(S): 250 TABLET, FILM COATED ORAL at 05:54

## 2024-05-19 RX ADMIN — Medication 3 MILLILITER(S): at 03:01

## 2024-05-19 RX ADMIN — MIDODRINE HYDROCHLORIDE 5 MILLIGRAM(S): 2.5 TABLET ORAL at 05:54

## 2024-05-19 RX ADMIN — RALOXIFENE HYDROCHLORIDE 60 MILLIGRAM(S): 60 TABLET, COATED ORAL at 12:09

## 2024-05-19 RX ADMIN — FAMOTIDINE 20 MILLIGRAM(S): 10 INJECTION INTRAVENOUS at 05:54

## 2024-05-19 RX ADMIN — MEROPENEM 100 MILLIGRAM(S): 1 INJECTION INTRAVENOUS at 22:12

## 2024-05-19 RX ADMIN — Medication 1 DROP(S): at 05:54

## 2024-05-19 RX ADMIN — LEVETIRACETAM 750 MILLIGRAM(S): 250 TABLET, FILM COATED ORAL at 18:04

## 2024-05-19 RX ADMIN — GABAPENTIN 300 MILLIGRAM(S): 400 CAPSULE ORAL at 18:04

## 2024-05-19 RX ADMIN — SENNA PLUS 2 TABLET(S): 8.6 TABLET ORAL at 22:14

## 2024-05-19 RX ADMIN — MEROPENEM 100 MILLIGRAM(S): 1 INJECTION INTRAVENOUS at 13:29

## 2024-05-19 RX ADMIN — Medication 1 TABLET(S): at 12:09

## 2024-05-19 RX ADMIN — Medication 1 APPLICATION(S): at 05:53

## 2024-05-19 NOTE — PROGRESS NOTE ADULT - SUBJECTIVE AND OBJECTIVE BOX
pt seen and examined.     My notes supersede resident's notes in case of discrepancy       ROS: no cp, no sob, no n/v, no fever    Vital Signs Last 24 Hrs  T(C): 36.6 (19 May 2024 05:00), Max: 37.2 (18 May 2024 19:20)  T(F): 97.9 (19 May 2024 05:00), Max: 99 (18 May 2024 19:20)  HR: 115 (19 May 2024 05:00) (81 - 115)  BP: 114/67 (19 May 2024 05:00) (87/56 - 114/67)  BP(mean): --  RR: 18 (19 May 2024 05:00) (18 - 18)  SpO2: 97% (19 May 2024 05:00) (97% - 99%)    Parameters below as of 19 May 2024 01:43  Patient On (Oxygen Delivery Method): mask, aerosol        physical exam  constitutional NAD, AAOX3, Respiratory  lungs CTA, CVS heart RRR, GI: abdomen Soft NT, ND, BS+, skin: intact  neuro exam no focal deficit     MEDICATIONS  (STANDING):  albuterol/ipratropium for Nebulization 3 milliLiter(s) Nebulizer every 6 hours  AQUAPHOR (petrolatum Ointment) 1 Application(s) Topical two times a day  artificial  tears Solution 1 Drop(s) Both EYES two times a day  calcium carbonate   1250 mG (OsCal) 1 Tablet(s) Oral daily  enoxaparin Injectable 30 milliGRAM(s) SubCutaneous every 24 hours  famotidine    Tablet 20 milliGRAM(s) Oral two times a day  gabapentin Solution 300 milliGRAM(s) Oral two times a day  levETIRAcetam  Solution 750 milliGRAM(s) Oral two times a day  meropenem  IVPB 1000 milliGRAM(s) IV Intermittent every 8 hours  midodrine 5 milliGRAM(s) Oral every 8 hours  raloxifene 60 milliGRAM(s) Oral daily  scopolamine 1 mG/72 Hr(s) Patch 1 Patch Transdermal every 72 hours  senna 2 Tablet(s) Oral at bedtime    MEDICATIONS  (PRN):  acetaminophen     Tablet .. 650 milliGRAM(s) Oral every 6 hours PRN Temp greater or equal to 38C (100.4F), Mild Pain (1 - 3)  melatonin 3 milliGRAM(s) Oral at bedtime PRN Insomnia  ondansetron Injectable 4 milliGRAM(s) IV Push every 8 hours PRN Nausea and/or Vomiting                        10.1   11.38 )-----------( 564      ( 19 May 2024 04:30 )             33.6     05-19    141  |  102  |  13  ----------------------------<  126<H>  5.0   |  25  |  0.6<L>    Ca    9.0      19 May 2024 04:30    TPro  6.6  /  Alb  3.0<L>  /  TBili  <0.2  /  DBili  x   /  AST  18  /  ALT  15  /  AlkPhos  101  05-19    Procalcitonin: 0.59 ng/mL [0.02 - 0.10] (05-06-24 @ 10:50)  Procalcitonin: 0.16 ng/mL [0.02 - 0.10] (05-03-24 @ 18:01)  Procalcitonin, Serum: 6.93 ng/mL [0.02 - 0.10] (04-16-24 @ 07:03)    a/p    57yo F w/ pmhx of Down's syndrome, cerebral palsy, seizure disorder presents from [facility] for respiratory distress. Admitted to SDU for severe sepsis and acute hypoxemic respiratory failure likely secondary to recurrent CAP.     # Acute hypoxemic respiratory failure   #Severe sepsis with septic shock poa  #possible recurrent pneumonia /suspected aspiration pna ( despite having peg )   #chronic hypotension   - covid/RSV, MSRA, negative; Bl cx with contaminant   - C/w Midodrine 5mg  - target MAP 60 ( Patient always has BP on the softer side )   - blood cx 4/15: staph capitis  - blood cx 4/15 and 4/17: NGTD  - Scopolamine for secretions. aspiration precautions   - Palliative care following   - 4/22 and 4/23: spiked fever and WBC ;WBC downtrending (10 on 4/26)  - F/u bcx 4/23 -> no growth to date  - CT chest angio w/ contrast 4/24: bilateral opacities   - currently on 3L NC   -  fungitell  >> nEG   -Blood cx >> NGTD 04/28  - 5/5-5/6:  overnight patient was tachy to 140s, hypotensive (around baseline), febrile to 38.5, given 1L LR bolus and then additional 500cc for elevated lactate of 3  - lactate improved overnight, tachy and febrile this am on 5/7/24, given 500cc bolus, CT showing asp pna,  - given 1x danna and vanco dose,    s/p  Meropenem  1g q8  x mutiple courses.   - will reduce volume of diet bolus to 250 cc due to suspected gastric distention contributing to aspiration     recurrent fever last night, resumed danna, recall ID     reduced the volume of bolus feeds given high suspicion for aspiration from hiatal hernia     # rash, probably contact dermatitis, improving   hold chlorhexidine wash, added topical care , monitor     #Episodes of Vomiting, complicated by Aspiration   - recurrent aspiration risk is high for coexisting hiatal hernia   - On PEG feed  , now on lower volume, tolerating   - gastric emptying study, neg     # B/l feet ulcers  - No abscess/cellulitis  - colonized with ESBL strain  - Off loading to prevent pressure sores   - wound care following     #seizure disorder, cont meds   #Down syndrome  #Nonverbal bedbound  # PEG tube   - c/w home meds    #Progress Note Handoff    Pending :  meeting with group home on monday , recall ID   Family discussion: meeting with group [place] is being arranged by the    Disposition: group home   code status: dnr, dni .

## 2024-05-20 LAB
ALBUMIN SERPL ELPH-MCNC: 2.8 G/DL — LOW (ref 3.5–5.2)
ALP SERPL-CCNC: 104 U/L — SIGNIFICANT CHANGE UP (ref 30–115)
ALT FLD-CCNC: 15 U/L — SIGNIFICANT CHANGE UP (ref 0–41)
ANION GAP SERPL CALC-SCNC: 9 MMOL/L — SIGNIFICANT CHANGE UP (ref 7–14)
AST SERPL-CCNC: 18 U/L — SIGNIFICANT CHANGE UP (ref 0–41)
BILIRUB SERPL-MCNC: <0.2 MG/DL — SIGNIFICANT CHANGE UP (ref 0.2–1.2)
BUN SERPL-MCNC: 15 MG/DL — SIGNIFICANT CHANGE UP (ref 10–20)
CALCIUM SERPL-MCNC: 8.9 MG/DL — SIGNIFICANT CHANGE UP (ref 8.4–10.5)
CHLORIDE SERPL-SCNC: 100 MMOL/L — SIGNIFICANT CHANGE UP (ref 98–110)
CK SERPL-CCNC: 84 U/L — SIGNIFICANT CHANGE UP (ref 0–225)
CO2 SERPL-SCNC: 31 MMOL/L — SIGNIFICANT CHANGE UP (ref 17–32)
CREAT SERPL-MCNC: 0.5 MG/DL — LOW (ref 0.7–1.5)
EGFR: 109 ML/MIN/1.73M2 — SIGNIFICANT CHANGE UP
GLUCOSE BLDC GLUCOMTR-MCNC: 105 MG/DL — HIGH (ref 70–99)
GLUCOSE BLDC GLUCOMTR-MCNC: 117 MG/DL — HIGH (ref 70–99)
GLUCOSE BLDC GLUCOMTR-MCNC: 137 MG/DL — HIGH (ref 70–99)
GLUCOSE BLDC GLUCOMTR-MCNC: 144 MG/DL — HIGH (ref 70–99)
GLUCOSE SERPL-MCNC: 112 MG/DL — HIGH (ref 70–99)
GRAM STN FLD: ABNORMAL
GRAM STN FLD: ABNORMAL
HCT VFR BLD CALC: 30.6 % — LOW (ref 37–47)
HGB BLD-MCNC: 9.2 G/DL — LOW (ref 12–16)
MCHC RBC-ENTMCNC: 28.9 PG — SIGNIFICANT CHANGE UP (ref 27–31)
MCHC RBC-ENTMCNC: 30.1 G/DL — LOW (ref 32–37)
MCV RBC AUTO: 96.2 FL — SIGNIFICANT CHANGE UP (ref 81–99)
NRBC # BLD: 0 /100 WBCS — SIGNIFICANT CHANGE UP (ref 0–0)
PLATELET # BLD AUTO: 483 K/UL — HIGH (ref 130–400)
PMV BLD: 10.2 FL — SIGNIFICANT CHANGE UP (ref 7.4–10.4)
POTASSIUM SERPL-MCNC: 4.7 MMOL/L — SIGNIFICANT CHANGE UP (ref 3.5–5)
POTASSIUM SERPL-SCNC: 4.7 MMOL/L — SIGNIFICANT CHANGE UP (ref 3.5–5)
PROT SERPL-MCNC: 6.3 G/DL — SIGNIFICANT CHANGE UP (ref 6–8)
RBC # BLD: 3.18 M/UL — LOW (ref 4.2–5.4)
RBC # FLD: 20.7 % — HIGH (ref 11.5–14.5)
SODIUM SERPL-SCNC: 140 MMOL/L — SIGNIFICANT CHANGE UP (ref 135–146)
SPECIMEN SOURCE: SIGNIFICANT CHANGE UP
WBC # BLD: 7.64 K/UL — SIGNIFICANT CHANGE UP (ref 4.8–10.8)
WBC # FLD AUTO: 7.64 K/UL — SIGNIFICANT CHANGE UP (ref 4.8–10.8)

## 2024-05-20 RX ORDER — DAPTOMYCIN 500 MG/10ML
450 INJECTION, POWDER, LYOPHILIZED, FOR SOLUTION INTRAVENOUS EVERY 24 HOURS
Refills: 0 | Status: COMPLETED | OUTPATIENT
Start: 2024-05-20 | End: 2024-05-26

## 2024-05-20 RX ADMIN — Medication 3 MILLILITER(S): at 13:42

## 2024-05-20 RX ADMIN — DAPTOMYCIN 118 MILLIGRAM(S): 500 INJECTION, POWDER, LYOPHILIZED, FOR SOLUTION INTRAVENOUS at 11:32

## 2024-05-20 RX ADMIN — MEROPENEM 100 MILLIGRAM(S): 1 INJECTION INTRAVENOUS at 22:22

## 2024-05-20 RX ADMIN — Medication 3 MILLILITER(S): at 20:24

## 2024-05-20 RX ADMIN — MIDODRINE HYDROCHLORIDE 5 MILLIGRAM(S): 2.5 TABLET ORAL at 05:26

## 2024-05-20 RX ADMIN — Medication 1 APPLICATION(S): at 05:27

## 2024-05-20 RX ADMIN — Medication 1 APPLICATION(S): at 18:20

## 2024-05-20 RX ADMIN — FAMOTIDINE 20 MILLIGRAM(S): 10 INJECTION INTRAVENOUS at 05:26

## 2024-05-20 RX ADMIN — Medication 1 DROP(S): at 18:20

## 2024-05-20 RX ADMIN — Medication 650 MILLIGRAM(S): at 22:21

## 2024-05-20 RX ADMIN — SCOPALAMINE 1 PATCH: 1 PATCH, EXTENDED RELEASE TRANSDERMAL at 06:19

## 2024-05-20 RX ADMIN — FAMOTIDINE 20 MILLIGRAM(S): 10 INJECTION INTRAVENOUS at 18:19

## 2024-05-20 RX ADMIN — ENOXAPARIN SODIUM 30 MILLIGRAM(S): 100 INJECTION SUBCUTANEOUS at 13:00

## 2024-05-20 RX ADMIN — GABAPENTIN 300 MILLIGRAM(S): 400 CAPSULE ORAL at 18:20

## 2024-05-20 RX ADMIN — SENNA PLUS 2 TABLET(S): 8.6 TABLET ORAL at 22:22

## 2024-05-20 RX ADMIN — MIDODRINE HYDROCHLORIDE 5 MILLIGRAM(S): 2.5 TABLET ORAL at 22:21

## 2024-05-20 RX ADMIN — Medication 1 DROP(S): at 05:28

## 2024-05-20 RX ADMIN — RALOXIFENE HYDROCHLORIDE 60 MILLIGRAM(S): 60 TABLET, COATED ORAL at 11:32

## 2024-05-20 RX ADMIN — LEVETIRACETAM 750 MILLIGRAM(S): 250 TABLET, FILM COATED ORAL at 18:19

## 2024-05-20 RX ADMIN — GABAPENTIN 300 MILLIGRAM(S): 400 CAPSULE ORAL at 05:28

## 2024-05-20 RX ADMIN — MEROPENEM 100 MILLIGRAM(S): 1 INJECTION INTRAVENOUS at 13:00

## 2024-05-20 RX ADMIN — MIDODRINE HYDROCHLORIDE 5 MILLIGRAM(S): 2.5 TABLET ORAL at 13:00

## 2024-05-20 RX ADMIN — LEVETIRACETAM 750 MILLIGRAM(S): 250 TABLET, FILM COATED ORAL at 05:26

## 2024-05-20 RX ADMIN — Medication 1 TABLET(S): at 11:32

## 2024-05-20 RX ADMIN — Medication 3 MILLILITER(S): at 08:22

## 2024-05-20 RX ADMIN — MEROPENEM 100 MILLIGRAM(S): 1 INJECTION INTRAVENOUS at 05:27

## 2024-05-20 NOTE — PROGRESS NOTE ADULT - SUBJECTIVE AND OBJECTIVE BOX
TEA ECHAVARRIA  58y, Female  Allergy: chlorhexidine containing compounds (Rash (Mild to Mod))      LOS  35d    CHIEF COMPLAINT: Pneumonia (20 May 2024 10:30)      INTERVAL EVENTS/HPI  - T(F): , Max: 99 (05-19-24 @ 20:09)  - Reconsulted for positive blood cx -- had fever on 5/18   - Blood Cx 5/18 + GPC in clusters in 1 set, in 2nd set, Proteus, Staph epi, E faecalis, E faecium - ESBL gene detected   - recently has rash from chlorhexidine wash which have been improving     - WBC Count: 7.64 (05-20-24 @ 06:55)  WBC Count: 11.38 (05-19-24 @ 04:30)     - Creatinine: 0.5 (05-20-24 @ 06:55)  Creatinine: 0.6 (05-19-24 @ 04:30)       ROS  unable to obtain history secondary to patient's mental status and/or sedation    VITALS:  T(F): 98.9, Max: 99 (05-19-24 @ 20:09)  HR: 80  BP: 110/60  RR: 18Vital Signs Last 24 Hrs  T(C): 37.2 (20 May 2024 05:00), Max: 37.2 (19 May 2024 20:09)  T(F): 98.9 (20 May 2024 05:00), Max: 99 (19 May 2024 20:09)  HR: 80 (20 May 2024 05:00) (80 - 105)  BP: 110/60 (20 May 2024 05:00) (93/66 - 110/60)  BP(mean): --  RR: 18 (19 May 2024 20:09) (18 - 18)  SpO2: 96% (19 May 2024 13:36) (96% - 96%)        PHYSICAL EXAM:  Gen: NAD, resting in bed  HEENT: Normocephalic, atraumatic  Neck: supple, no lymphadenopathy  CV: Regular rate & regular rhythm  Lungs: decreased BS at bases, no fremitus  Abdomen: Soft, BS present  Ext: Warm, well perfused  Neuro: non focal, awake  Skin: no rash, no erythema  Lines: no phlebitis    FH: Non-contributory  Social Hx: Non-contributory    TESTS & MEASUREMENTS:                        9.2    7.64  )-----------( 483      ( 20 May 2024 06:55 )             30.6     05-20    140  |  100  |  15  ----------------------------<  112<H>  4.7   |  31  |  0.5<L>    Ca    8.9      20 May 2024 06:55    TPro  6.3  /  Alb  2.8<L>  /  TBili  <0.2  /  DBili  x   /  AST  18  /  ALT  15  /  AlkPhos  104  05-20      LIVER FUNCTIONS - ( 20 May 2024 06:55 )  Alb: 2.8 g/dL / Pro: 6.3 g/dL / ALK PHOS: 104 U/L / ALT: 15 U/L / AST: 18 U/L / GGT: x           Urinalysis Basic - ( 20 May 2024 06:55 )    Color: x / Appearance: x / SG: x / pH: x  Gluc: 112 mg/dL / Ketone: x  / Bili: x / Urobili: x   Blood: x / Protein: x / Nitrite: x   Leuk Esterase: x / RBC: x / WBC x   Sq Epi: x / Non Sq Epi: x / Bacteria: x        Culture - Blood (collected 05-18-24 @ 22:29)  Source: .Blood Blood  Gram Stain (05-20-24 @ 12:21):    Growth in aerobic bottle: Gram Positive Cocci in Clusters    Growth in anaerobic bottle: Gram Positive Cocci in Clusters  Preliminary Report (05-20-24 @ 12:21):    Growth in aerobic bottle: Gram Positive Cocci in Clusters    Growth in anaerobic bottle: Gram Positive Cocci in Clusters    Culture - Blood (collected 05-18-24 @ 22:29)  Source: .Blood Blood  Gram Stain (05-19-24 @ 17:12):    Growth in aerobic bottle: Gram Negative Rods and Gram positive cocci in    pairs  Preliminary Report (05-20-24 @ 10:25):    Growth in aerobic bottle: Proteus mirabilis    Growth in aerobic bottle: Gram positive cocci in pairs    Direct identification is available within approximately 3-5    hours either by Blood Panel Multiplexed PCR or Direct    MALDI-TOF. Details: [email or web address]    Vancomycin resistance gene detected    When multiple species' of Enterococcus are present, association of a    resistance gene to a specific organism cannot be determined.  Organism: Blood Culture PCR (05-19-24 @ 20:30)  Organism: Blood Culture PCR (05-19-24 @ 20:30)      Method Type: PCR      -  Proteus species: Detec      -  Enterococcus faecalis: Detec      -  Enterococcus faecium: Detec      -  Staphylococcus epidermidis, Methicillin resistant: Detec      -  ESBL: Detec      -  CTX-M Resistance Marker: Detec    Culture - Blood (collected 05-06-24 @ 04:20)  Source: .Blood Blood-Peripheral  Final Report (05-11-24 @ 14:01):    No growth at 5 days    Culture - Blood (collected 05-05-24 @ 17:54)  Source: .Blood Blood  Final Report (05-11-24 @ 01:00):    No growth at 5 days    Culture - Blood (collected 05-04-24 @ 07:07)  Source: .Blood None  Final Report (05-09-24 @ 18:00):    No growth at 5 days    Culture - Blood (collected 05-03-24 @ 18:00)  Source: .Blood Blood  Final Report (05-08-24 @ 23:10):    No growth at 5 days    Culture - Blood (collected 04-28-24 @ 07:23)  Source: .Blood None  Final Report (05-03-24 @ 17:00):    No growth at 5 days    Culture - Blood (collected 04-23-24 @ 15:26)  Source: .Blood Blood  Final Report (04-28-24 @ 20:00):    No growth at 5 days    Culture - Blood (collected 04-21-24 @ 06:07)  Source: .Blood None  Final Report (04-26-24 @ 16:00):    No growth at 5 days            INFECTIOUS DISEASES TESTING  Procalcitonin: 0.59 (05-06-24 @ 10:50)  Procalcitonin: 0.16 (05-03-24 @ 18:01)  Aspergillus Galactomannan Antigen: 0.03 (05-03-24 @ 18:01)  Fungitell: 86 (05-03-24 @ 18:00)  Fungitell: 53 (04-25-24 @ 21:33)  Fungitell: 53 (04-24-24 @ 05:57)  MRSA PCR Result.: Negative (04-16-24 @ 14:39)  Rapid RVP Result: NotDetec (04-16-24 @ 14:39)  Procalcitonin, Serum: 6.93 (04-16-24 @ 07:03)  MRSA PCR Result.: Negative (03-16-24 @ 23:45)  Fungitell: 81 (03-13-24 @ 17:24)  Procalcitonin, Serum: 0.19 (03-13-24 @ 07:08)  Fungitell: 73 (02-23-24 @ 18:19)  MRSA PCR Result.: Negative (02-20-24 @ 13:42)  Fungitell: 76 (02-19-24 @ 16:00)  Procalcitonin, Serum: 0.16 (02-19-24 @ 16:00)  Procalcitonin, Serum: 0.20 (02-19-24 @ 11:23)  Rapid RVP Result: NotDetec (02-17-24 @ 19:06)  Procalcitonin, Serum: 0.11 (02-17-24 @ 10:41)  MRSA PCR Result.: Negative (02-15-24 @ 06:20)  Procalcitonin, Serum: 0.10 (02-12-24 @ 11:17)  Rapid RVP Result: NotDetec (02-12-24 @ 10:28)  MRSA PCR Result.: Negative (02-12-24 @ 10:28)  Fungitell: 63 (02-06-24 @ 11:27)  Fungitell: 65 (02-06-24 @ 04:43)  Rapid RVP Result: NotDetec (02-04-24 @ 10:28)  MRSA PCR Result.: Negative (02-03-24 @ 21:32)  Procalcitonin, Serum: 0.15 (02-03-24 @ 11:13)  Procalcitonin, Serum: 0.22 (02-01-24 @ 16:00)  MRSA PCR Result.: Negative (01-22-24 @ 05:30)  Legionella Antigen, Urine: Negative (01-21-24 @ 05:00)  Rapid RVP Result: Detected (01-21-24 @ 00:58)  Procalcitonin, Serum: 0.51 (01-20-24 @ 21:23)  Fungitell: 325 (01-20-24 @ 21:23)  Fungitell: 138 (11-07-23 @ 08:08)  Fungitell: 115 (11-02-23 @ 11:40)  Procalcitonin, Serum: 0.04 (11-02-23 @ 11:40)  Procalcitonin, Serum: 0.26 (10-24-23 @ 11:00)  MRSA PCR Result.: Negative (10-17-23 @ 17:20)  Fungitell: >500 (10-17-23 @ 17:20)  Procalcitonin, Serum: 4.36 (10-17-23 @ 17:20)  Procalcitonin, Serum: 4.18 (10-17-23 @ 12:35)  Procalcitonin, Serum: 0.07 (10-15-23 @ 07:28)  COVID-19 PCR: NotDetec (10-12-23 @ 09:14)  Procalcitonin, Serum: 0.40 (10-07-23 @ 10:54)  Legionella Antigen, Urine: Negative (09-30-23 @ 14:36)  MRSA PCR Result.: Negative (09-29-23 @ 16:50)  Procalcitonin, Serum: 9.78 (08-12-23 @ 11:25)  MRSA PCR Result.: Negative (08-12-23 @ 06:10)  Rapid RVP Result: NotDetec (08-12-23 @ 01:33)  Procalcitonin, Serum: 0.63 (08-10-23 @ 08:46)  Procalcitonin, Serum: 7.82 (08-04-23 @ 16:13)  MRSA PCR Result.: Negative (08-04-23 @ 14:52)  Rapid RVP Result: NotDetec (08-04-23 @ 03:00)      INFLAMMATORY MARKERS  Sedimentation Rate, Erythrocyte: 100 mm/Hr (02-03-24 @ 11:13)  C-Reactive Protein, Serum: 214.2 mg/L (02-03-24 @ 11:13)  C-Reactive Protein, Serum: 132.3 mg/L (02-01-24 @ 16:00)      RADIOLOGY & ADDITIONAL TESTS:  I have personally reviewed the last available Chest xray  CXR      CT      CARDIOLOGY TESTING  12 Lead ECG:   Ventricular Rate 101 BPM    Atrial Rate 101 BPM    P-R Interval 126 ms    QRS Duration 66 ms    Q-T Interval 346 ms    QTC Calculation(Bazett) 448 ms    P Axis 31 degrees    R Axis 54 degrees    T Axis 39 degrees    Diagnosis Line Sinus tachycardia  Otherwise normal ECG    Confirmed by Behuria, Supreeti ([id number]) on 5/12/2024 2:52:46 PM (05-11-24 @ 23:29)      MEDICATIONS  albuterol/ipratropium for Nebulization 3 Nebulizer every 6 hours  AQUAPHOR (petrolatum Ointment) 1 Topical two times a day  artificial  tears Solution 1 Both EYES two times a day  calcium carbonate   1250 mG (OsCal) 1 Oral daily  DAPTOmycin IVPB 450 IV Intermittent every 24 hours  enoxaparin Injectable 30 SubCutaneous every 24 hours  famotidine    Tablet 20 Oral two times a day  gabapentin Solution 300 Oral two times a day  levETIRAcetam  Solution 750 Oral two times a day  meropenem  IVPB 1000 IV Intermittent every 8 hours  midodrine 5 Oral every 8 hours  raloxifene 60 Oral daily  scopolamine 1 mG/72 Hr(s) Patch 1 Transdermal every 72 hours  senna 2 Oral at bedtime      WEIGHT  Weight (kg): 42.7 (05-14-24 @ 02:54)  Creatinine: 0.5 mg/dL (05-20-24 @ 06:55)      ANTIBIOTICS:  DAPTOmycin IVPB 450 milliGRAM(s) IV Intermittent every 24 hours  meropenem  IVPB 1000 milliGRAM(s) IV Intermittent every 8 hours      All available historical records have been reviewed

## 2024-05-20 NOTE — PROGRESS NOTE ADULT - SUBJECTIVE AND OBJECTIVE BOX
24H events:    Patient is a 58y old Female who presents with a chief complaint of Pneumonia (19 May 2024 13:24)    Primary diagnosis of Acute sepsis        Today is hospital day 35d. This morning patient was seen and examined at bedside, resting comfortably in bed.    No acute or major events overnight.    Code Status:    Family communication:  Contact date:  Name of person contacted:  Relationship to patient:  Communication details:  What matters most:    PAST MEDICAL & SURGICAL HISTORY  Down syndrome    Osteoporosis    Mild anemia    Neuropathy    S/P debridement  of R hip on 3/2/21      SOCIAL HISTORY:  Social History:      ALLERGIES:  chlorhexidine containing compounds (Rash (Mild to Mod))    MEDICATIONS:  STANDING MEDICATIONS  albuterol/ipratropium for Nebulization 3 milliLiter(s) Nebulizer every 6 hours  AQUAPHOR (petrolatum Ointment) 1 Application(s) Topical two times a day  artificial  tears Solution 1 Drop(s) Both EYES two times a day  calcium carbonate   1250 mG (OsCal) 1 Tablet(s) Oral daily  DAPTOmycin IVPB 450 milliGRAM(s) IV Intermittent every 24 hours  enoxaparin Injectable 30 milliGRAM(s) SubCutaneous every 24 hours  famotidine    Tablet 20 milliGRAM(s) Oral two times a day  gabapentin Solution 300 milliGRAM(s) Oral two times a day  levETIRAcetam  Solution 750 milliGRAM(s) Oral two times a day  meropenem  IVPB 1000 milliGRAM(s) IV Intermittent every 8 hours  midodrine 5 milliGRAM(s) Oral every 8 hours  raloxifene 60 milliGRAM(s) Oral daily  scopolamine 1 mG/72 Hr(s) Patch 1 Patch Transdermal every 72 hours  senna 2 Tablet(s) Oral at bedtime    PRN MEDICATIONS  acetaminophen     Tablet .. 650 milliGRAM(s) Oral every 6 hours PRN  melatonin 3 milliGRAM(s) Oral at bedtime PRN  ondansetron Injectable 4 milliGRAM(s) IV Push every 8 hours PRN    VITALS:   T(F): 98.9  HR: 80  BP: 110/60  RR: 18  SpO2: 96%    PHYSICAL EXAM:  GENERAL:   ( x ) NAD, lying in bed comfortably     (  ) obtunded     (  ) lethargic     (  ) somnolent    HEAD:   ( x ) Atraumatic     (  ) hematoma     (  ) laceration (specify location:       )     NECK:  ( x ) Supple     (  ) neck stiffness     (  ) nuchal rigidity     (  )  no JVD     (  ) JVD present ( -- cm)    HEART:  Rate -->     (  ) normal rate     (  ) bradycardic     (  ) tachycardic  Rhythm -->     ( x ) regular     (  ) regularly irregular     (  ) irregularly irregular  Murmurs -->     (  ) normal s1s2     (  ) systolic murmur     (  ) diastolic murmur     (  ) continuous murmur      (  ) S3 present     (  ) S4 present    LUNGS:   (x  )Unlabored respirations     (  ) tachypnea  ( x ) B/L air entry     (  ) decreased breath sounds in:  (location     )    (  ) no adventitious sound     (  ) crackles     (  ) wheezing      (  ) rhonchi      (specify location:       )  (  ) chest wall tenderness (specify location:       )    ABDOMEN:   ( x ) Soft     (  ) tense   |   (  x) nondistended     (  ) distended   |   (  ) +BS     (  ) hypoactive bowel sounds     (  ) hyperactive bowel sounds  ( x ) nontender     (  ) RUQ tenderness     (  ) RLQ tenderness     (  ) LLQ tenderness     (  ) epigastric tenderness     (  ) diffuse tenderness  (  ) Splenomegaly      (  ) Hepatomegaly      (  ) Jaundice     (  ) ecchymosis     EXTREMITIES:  (  ) Normal     (  ) Rash     (  ) ecchymosis     (  ) varicose veins      (  ) pitting edema     (  ) non-pitting edema   (  ) ulceration     (  ) gangrene:     (location:     )    NERVOUS SYSTEM:    (  ) A&Ox3     (  ) confused     (  ) lethargic  CN II-XII:     (  ) Intact     (  ) deficits found     (Specify:     )   Upper extremities:     (  ) no sensorimotor deficits     (  ) weakness     (  ) loss of proprioception/vibration     (  ) loss of touch/temperature (specify:    )  Lower extremities:     (  ) no sensorimotor deficits     (  ) weakness     (  ) loss of proprioception/vibration     (  ) loss of touch/temperature (specify:    )    SKIN:   (  ) No rashes or lesions     (  ) maculopapular rash     (  ) pustules     (  ) vesicles     (  ) ulcer     (  ) ecchymosis     (specify location:     )    AMPAC score:    (  ) Indwelling Madsen Catheter:   Date insterted:    Reason (  ) Critical illness     (  ) urinary retention    (  ) Accurate Ins/Outs Monitoring     (  ) CMO patient    (  ) Central Line:   Date inserted:  Location: (  ) Right IJ     (  ) Left IJ     (  ) Right Fem     (  ) Left Fem    (  ) SPC        (  ) pigtail       (  ) PEG tube       (  ) colostomy       (  ) jejunostomy  (  ) U-Dall    LABS:                        9.2    7.64  )-----------( 483      ( 20 May 2024 06:55 )             30.6     05-20    140  |  100  |  15  ----------------------------<  112<H>  4.7   |  31  |  0.5<L>    Ca    8.9      20 May 2024 06:55    TPro  6.3  /  Alb  2.8<L>  /  TBili  <0.2  /  DBili  x   /  AST  18  /  ALT  15  /  AlkPhos  104  05-20      Urinalysis Basic - ( 20 May 2024 06:55 )    Color: x / Appearance: x / SG: x / pH: x  Gluc: 112 mg/dL / Ketone: x  / Bili: x / Urobili: x   Blood: x / Protein: x / Nitrite: x   Leuk Esterase: x / RBC: x / WBC x   Sq Epi: x / Non Sq Epi: x / Bacteria: x            Culture - Blood (collected 18 May 2024 22:29)  Source: .Blood Blood  Gram Stain (20 May 2024 01:06):    Growth in aerobic bottle: Gram Positive Cocci in Clusters  Preliminary Report (20 May 2024 01:06):    Growth in aerobic bottle: Gram Positive Cocci in Clusters    Culture - Blood (collected 18 May 2024 22:29)  Source: .Blood Blood  Gram Stain (19 May 2024 17:12):    Growth in aerobic bottle: Gram Negative Rods and Gram positive cocci in    pairs  Preliminary Report (20 May 2024 10:25):    Growth in aerobic bottle: Proteus mirabilis    Growth in aerobic bottle: Gram positive cocci in pairs    Direct identification is available within approximately 3-5    hours either by Blood Panel Multiplexed PCR or Direct    MALDI-TOF. Details: [email or web address]    Vancomycin resistance gene detected    When multiple species' of Enterococcus are present, association of a    resistance gene to a specific organism cannot be determined.  Organism: Blood Culture PCR (19 May 2024 20:30)  Organism: Blood Culture PCR (19 May 2024 20:30)          RADIOLOGY:          < from: NM Gastric Emptying Liquid (24 @ 13:35) >    Impression:  Normal emptying of radiolabeled meal from the stomach over time with 42%   retention at 1 hour (normal is < 51%).    --- End of Report ---      < end of copied text >  < from: Xray Chest 1 View- PORTABLE-Routine (24 @ 04:21) >  IMPRESSION:    Stable bilateral opacities, right greater than left.    --- End of Report ---    < end of copied text >  < from: CT Chest w/ IV Cont (24 @ 23:32) >  IMPRESSION:     Patulous distended fluid-filled esophagus associated with 7.4 cm hiatal   hernia (at risk for aspiration)..    Bilateral areas of consolidation both lower lobes.  Patchy opacity both lungs, right greater than left, similar to earlier   study. Interval resolution of previous small effusions.    Markedly distended urinary bladder with dependent debris, possible   hemorrhage. No bladder calculi. (). Correlate with urinalysis.      --- End of Report ---    < end of copied text >  < from: CT Abdomen and Pelvis w/ IV Cont (24 @ 23:32) >  IMPRESSION:     Patulous distended fluid-filled esophagus associated with 7.4 cm hiatal   hernia (at risk for aspiration)..    Bilateral areas of consolidation both lower lobes.  Patchy opacity both lungs, right greater than left, similar to earlier   study. Interval resolution of previous small effusions.    Markedly distended urinary bladder with dependent debris, possible   hemorrhage. No bladder calculi. (/). Correlate with urinalysis.      --- End of Report ---    < end of copied text >      ASSESSMENT/PLAN:     58 year old female with PMH of Down Syndrome, cerebral palsy, seizure disorder presents from [facility] for respiratory distress. Admitted to SDU for severe sepsis & acute hypoxemic respiraotry failure [facility] / recurrent CAP.     #Acute Hypoxemic Respiratory Failure  #Severe sepsis with Septic Shock  #Possible Recurrent Aspiration (Despite having PEG)  #Chronic Hypotension  - COVID/RVP/MRSA negative  - c/w midodrine 5 mg TID  - target MAP > 60  - BCx 4/15: S. capitis  BCx 4/15 & : NGTD  - scopolamine for secretions  - aspiration precautions  - CT chest: bilateral opacities  -  & : spiked fever & WBCs  - BCx : NGTD  - BCX : NGTD  - -: overnight tachycardic 140s, hypotensive, febrile, given 1L LR bolus & then 500 cc for elevated lactate  - Spiked fever on   - s/p meropenem 1g q8h x multiple courses  - reduced volume of diet bolus to 250 & meropenem resumed, ID recalled on      #Rash 2/2 Contact Dermatitis likely 2/2 CHG - improving  - hold CHG wash  - c/w topical care & monitoring    #Episodes of Vomiting c/b Aspiration  - recurrent aspiration risk due to hiatal hernia  - on PEG feed, lower volume  - gastric emptying study negative    #B/l Foot Ulcers  - no abscess/cellulitis  - off loading  - wound care    #Seizure Disorder  #Down Syndrome  #Nonverbal   #Bedbound  #PEG tube  - c/w keppra                                           --------------------------------------------------------------    # DVT prophylaxis: Lovenox  # GI prophylaxis: PPI  # Diet:   # Activity:   # Code status: Full Code  # Disposition:    Pendin) ID f/u 24H events:    Patient is a 58y old Female who presents with a chief complaint of Pneumonia (19 May 2024 13:24)    Primary diagnosis of Acute sepsis        Today is hospital day 35d. This morning patient was seen and examined at bedside, resting comfortably in bed.    No acute or major events overnight.    Code Status:    Family communication:  Contact date:  Name of person contacted:  Relationship to patient:  Communication details:  What matters most:    PAST MEDICAL & SURGICAL HISTORY  Down syndrome    Osteoporosis    Mild anemia    Neuropathy    S/P debridement  of R hip on 3/2/21      SOCIAL HISTORY:  Social History:      ALLERGIES:  chlorhexidine containing compounds (Rash (Mild to Mod))    MEDICATIONS:  STANDING MEDICATIONS  albuterol/ipratropium for Nebulization 3 milliLiter(s) Nebulizer every 6 hours  AQUAPHOR (petrolatum Ointment) 1 Application(s) Topical two times a day  artificial  tears Solution 1 Drop(s) Both EYES two times a day  calcium carbonate   1250 mG (OsCal) 1 Tablet(s) Oral daily  DAPTOmycin IVPB 450 milliGRAM(s) IV Intermittent every 24 hours  enoxaparin Injectable 30 milliGRAM(s) SubCutaneous every 24 hours  famotidine    Tablet 20 milliGRAM(s) Oral two times a day  gabapentin Solution 300 milliGRAM(s) Oral two times a day  levETIRAcetam  Solution 750 milliGRAM(s) Oral two times a day  meropenem  IVPB 1000 milliGRAM(s) IV Intermittent every 8 hours  midodrine 5 milliGRAM(s) Oral every 8 hours  raloxifene 60 milliGRAM(s) Oral daily  scopolamine 1 mG/72 Hr(s) Patch 1 Patch Transdermal every 72 hours  senna 2 Tablet(s) Oral at bedtime    PRN MEDICATIONS  acetaminophen     Tablet .. 650 milliGRAM(s) Oral every 6 hours PRN  melatonin 3 milliGRAM(s) Oral at bedtime PRN  ondansetron Injectable 4 milliGRAM(s) IV Push every 8 hours PRN    VITALS:   T(F): 98.9  HR: 80  BP: 110/60  RR: 18  SpO2: 96%    PHYSICAL EXAM:  GENERAL:   ( x ) NAD, lying in bed comfortably     (  ) obtunded     (  ) lethargic     (  ) somnolent    HEAD:   ( x ) Atraumatic     (  ) hematoma     (  ) laceration (specify location:       )     NECK:  ( x ) Supple     (  ) neck stiffness     (  ) nuchal rigidity     (  )  no JVD     (  ) JVD present ( -- cm)    HEART:  Rate -->     (  ) normal rate     (  ) bradycardic     (  ) tachycardic  Rhythm -->     ( x ) regular     (  ) regularly irregular     (  ) irregularly irregular  Murmurs -->     (  ) normal s1s2     (  ) systolic murmur     (  ) diastolic murmur     (  ) continuous murmur      (  ) S3 present     (  ) S4 present    LUNGS:   (x  )Unlabored respirations     (  ) tachypnea  ( x ) B/L air entry     (  ) decreased breath sounds in:  (location     )    (  ) no adventitious sound     (  ) crackles     (  ) wheezing      (  ) rhonchi      (specify location:       )  (  ) chest wall tenderness (specify location:       )    ABDOMEN:   ( x ) Soft     (  ) tense   |   (  x) nondistended     (  ) distended   |   (  ) +BS     (  ) hypoactive bowel sounds     (  ) hyperactive bowel sounds  ( x ) nontender     (  ) RUQ tenderness     (  ) RLQ tenderness     (  ) LLQ tenderness     (  ) epigastric tenderness     (  ) diffuse tenderness  (  ) Splenomegaly      (  ) Hepatomegaly      (  ) Jaundice     (  ) ecchymosis     EXTREMITIES:  (  ) Normal     (  ) Rash     (  ) ecchymosis     (  ) varicose veins      (  ) pitting edema     (  ) non-pitting edema   (  ) ulceration     (  ) gangrene:     (location:     )    NERVOUS SYSTEM:    (  ) A&Ox3     (  ) confused     (  ) lethargic  CN II-XII:     (  ) Intact     (  ) deficits found     (Specify:     )   Upper extremities:     (  ) no sensorimotor deficits     (  ) weakness     (  ) loss of proprioception/vibration     (  ) loss of touch/temperature (specify:    )  Lower extremities:     (  ) no sensorimotor deficits     (  ) weakness     (  ) loss of proprioception/vibration     (  ) loss of touch/temperature (specify:    )    SKIN:   (  ) No rashes or lesions     (  ) maculopapular rash     (  ) pustules     (  ) vesicles     (  ) ulcer     (  ) ecchymosis     (specify location:     )    AMPAC score:    (  ) Indwelling Madsen Catheter:   Date insterted:    Reason (  ) Critical illness     (  ) urinary retention    (  ) Accurate Ins/Outs Monitoring     (  ) CMO patient    (  ) Central Line:   Date inserted:  Location: (  ) Right IJ     (  ) Left IJ     (  ) Right Fem     (  ) Left Fem    (  ) SPC        (  ) pigtail       (  ) PEG tube       (  ) colostomy       (  ) jejunostomy  (  ) U-Dall    LABS:                        9.2    7.64  )-----------( 483      ( 20 May 2024 06:55 )             30.6     05-20    140  |  100  |  15  ----------------------------<  112<H>  4.7   |  31  |  0.5<L>    Ca    8.9      20 May 2024 06:55    TPro  6.3  /  Alb  2.8<L>  /  TBili  <0.2  /  DBili  x   /  AST  18  /  ALT  15  /  AlkPhos  104  05-20      Urinalysis Basic - ( 20 May 2024 06:55 )    Color: x / Appearance: x / SG: x / pH: x  Gluc: 112 mg/dL / Ketone: x  / Bili: x / Urobili: x   Blood: x / Protein: x / Nitrite: x   Leuk Esterase: x / RBC: x / WBC x   Sq Epi: x / Non Sq Epi: x / Bacteria: x            Culture - Blood (collected 18 May 2024 22:29)  Source: .Blood Blood  Gram Stain (20 May 2024 01:06):    Growth in aerobic bottle: Gram Positive Cocci in Clusters  Preliminary Report (20 May 2024 01:06):    Growth in aerobic bottle: Gram Positive Cocci in Clusters    Culture - Blood (collected 18 May 2024 22:29)  Source: .Blood Blood  Gram Stain (19 May 2024 17:12):    Growth in aerobic bottle: Gram Negative Rods and Gram positive cocci in    pairs  Preliminary Report (20 May 2024 10:25):    Growth in aerobic bottle: Proteus mirabilis    Growth in aerobic bottle: Gram positive cocci in pairs    Direct identification is available within approximately 3-5    hours either by Blood Panel Multiplexed PCR or Direct    MALDI-TOF. Details: [email or web address]    Vancomycin resistance gene detected    When multiple species' of Enterococcus are present, association of a    resistance gene to a specific organism cannot be determined.  Organism: Blood Culture PCR (19 May 2024 20:30)  Organism: Blood Culture PCR (19 May 2024 20:30)          RADIOLOGY:          < from: NM Gastric Emptying Liquid (24 @ 13:35) >    Impression:  Normal emptying of radiolabeled meal from the stomach over time with 42%   retention at 1 hour (normal is < 51%).    --- End of Report ---      < end of copied text >  < from: Xray Chest 1 View- PORTABLE-Routine (24 @ 04:21) >  IMPRESSION:    Stable bilateral opacities, right greater than left.    --- End of Report ---    < end of copied text >  < from: CT Chest w/ IV Cont (24 @ 23:32) >  IMPRESSION:     Patulous distended fluid-filled esophagus associated with 7.4 cm hiatal   hernia (at risk for aspiration)..    Bilateral areas of consolidation both lower lobes.  Patchy opacity both lungs, right greater than left, similar to earlier   study. Interval resolution of previous small effusions.    Markedly distended urinary bladder with dependent debris, possible   hemorrhage. No bladder calculi. (). Correlate with urinalysis.      --- End of Report ---    < end of copied text >  < from: CT Abdomen and Pelvis w/ IV Cont (24 @ 23:32) >  IMPRESSION:     Patulous distended fluid-filled esophagus associated with 7.4 cm hiatal   hernia (at risk for aspiration)..    Bilateral areas of consolidation both lower lobes.  Patchy opacity both lungs, right greater than left, similar to earlier   study. Interval resolution of previous small effusions.    Markedly distended urinary bladder with dependent debris, possible   hemorrhage. No bladder calculi. (/). Correlate with urinalysis.      --- End of Report ---    < end of copied text >      ASSESSMENT/PLAN:     58 year old female with PMH of Down Syndrome, cerebral palsy, seizure disorder presents from [facility] for respiratory distress. Admitted to SDU for severe sepsis & acute hypoxemic respiraotry failure [facility] / recurrent CAP.     #Acute Hypoxemic Respiratory Failure  #Severe sepsis with Septic Shock  #Possible Recurrent Aspiration (Despite having PEG)  #Chronic Hypotension  - COVID/RVP/MRSA negative  - c/w midodrine 5 mg TID  - target MAP > 60  - BCx 4/15: S. capitis  BCx 4/15 & : NGTD  - scopolamine for secretions  - aspiration precautions  - CT chest: bilateral opacities  -  & : spiked fever & WBCs  - BCx : NGTD  - BCX : NGTD  - -: overnight tachycardic 140s, hypotensive, febrile, given 1L LR bolus & then 500 cc for elevated lactate  - Spiked fever on   - s/p meropenem 1g q8h x multiple courses  - reduced volume of diet bolus to 250 & meropenem resumed, ID recalled on    - started on IV Daptomycin 450 mg q24h for E. faecium, E. faecalis, MRSE bacteremia     #Rash 2 Contact Dermatitis likely  CHG - improving  - hold CHG wash  - c/w topical care & monitoring    #Episodes of Vomiting c/b Aspiration  - recurrent aspiration risk due to hiatal hernia  - on PEG feed, lower volume  - gastric emptying study negative    #B/l Foot Ulcers  - no abscess/cellulitis  - off loading  - wound care    #Seizure Disorder  #Down Syndrome  #Nonverbal   #Bedbound  #PEG tube  - c/w keppra                                           --------------------------------------------------------------    # DVT prophylaxis: Lovenox  # GI prophylaxis: PPI  # Diet:   # Activity:   # Code status: Full Code  # Disposition:    Pendin) ID f/u 24H events:    Patient is a 58y old Female who presents with a chief complaint of Pneumonia (19 May 2024 13:24)    Primary diagnosis of Acute sepsis        Today is hospital day 35d. This morning patient was seen and examined at bedside, resting comfortably in bed.    No acute or major events overnight.    Code Status:    Family communication:  Contact date:  Name of person contacted:  Relationship to patient:  Communication details:  What matters most:    PAST MEDICAL & SURGICAL HISTORY  Down syndrome    Osteoporosis    Mild anemia    Neuropathy    S/P debridement  of R hip on 3/2/21      SOCIAL HISTORY:  Social History:      ALLERGIES:  chlorhexidine containing compounds (Rash (Mild to Mod))    MEDICATIONS:  STANDING MEDICATIONS  albuterol/ipratropium for Nebulization 3 milliLiter(s) Nebulizer every 6 hours  AQUAPHOR (petrolatum Ointment) 1 Application(s) Topical two times a day  artificial  tears Solution 1 Drop(s) Both EYES two times a day  calcium carbonate   1250 mG (OsCal) 1 Tablet(s) Oral daily  DAPTOmycin IVPB 450 milliGRAM(s) IV Intermittent every 24 hours  enoxaparin Injectable 30 milliGRAM(s) SubCutaneous every 24 hours  famotidine    Tablet 20 milliGRAM(s) Oral two times a day  gabapentin Solution 300 milliGRAM(s) Oral two times a day  levETIRAcetam  Solution 750 milliGRAM(s) Oral two times a day  meropenem  IVPB 1000 milliGRAM(s) IV Intermittent every 8 hours  midodrine 5 milliGRAM(s) Oral every 8 hours  raloxifene 60 milliGRAM(s) Oral daily  scopolamine 1 mG/72 Hr(s) Patch 1 Patch Transdermal every 72 hours  senna 2 Tablet(s) Oral at bedtime    PRN MEDICATIONS  acetaminophen     Tablet .. 650 milliGRAM(s) Oral every 6 hours PRN  melatonin 3 milliGRAM(s) Oral at bedtime PRN  ondansetron Injectable 4 milliGRAM(s) IV Push every 8 hours PRN    VITALS:   T(F): 98.9  HR: 80  BP: 110/60  RR: 18  SpO2: 96%    PHYSICAL EXAM:  GENERAL:   ( x ) NAD, lying in bed comfortably     (  ) obtunded     (  ) lethargic     (  ) somnolent    HEAD:   ( x ) Atraumatic     (  ) hematoma     (  ) laceration (specify location:       )     NECK:  ( x ) Supple     (  ) neck stiffness     (  ) nuchal rigidity     (  )  no JVD     (  ) JVD present ( -- cm)    HEART:  Rate -->     (  ) normal rate     (  ) bradycardic     (  ) tachycardic  Rhythm -->     ( x ) regular     (  ) regularly irregular     (  ) irregularly irregular  Murmurs -->     (  ) normal s1s2     (  ) systolic murmur     (  ) diastolic murmur     (  ) continuous murmur      (  ) S3 present     (  ) S4 present    LUNGS:   (x  )Unlabored respirations     (  ) tachypnea  ( x ) B/L air entry     (  ) decreased breath sounds in:  (location     )    (  ) no adventitious sound     (  ) crackles     (  ) wheezing      (  ) rhonchi      (specify location:       )  (  ) chest wall tenderness (specify location:       )    ABDOMEN:   ( x ) Soft     (  ) tense   |   (  x) nondistended     (  ) distended   |   (  ) +BS     (  ) hypoactive bowel sounds     (  ) hyperactive bowel sounds  ( x ) nontender     (  ) RUQ tenderness     (  ) RLQ tenderness     (  ) LLQ tenderness     (  ) epigastric tenderness     (  ) diffuse tenderness  (  ) Splenomegaly      (  ) Hepatomegaly      (  ) Jaundice     (  ) ecchymosis     EXTREMITIES:  (  ) Normal     (  ) Rash     (  ) ecchymosis     (  ) varicose veins      (  ) pitting edema     (  ) non-pitting edema   (  ) ulceration     (  ) gangrene:     (location:     )    NERVOUS SYSTEM:    (  ) A&Ox3     (  ) confused     (  ) lethargic  CN II-XII:     (  ) Intact     (  ) deficits found     (Specify:     )   Upper extremities:     (  ) no sensorimotor deficits     (  ) weakness     (  ) loss of proprioception/vibration     (  ) loss of touch/temperature (specify:    )  Lower extremities:     (  ) no sensorimotor deficits     (  ) weakness     (  ) loss of proprioception/vibration     (  ) loss of touch/temperature (specify:    )    SKIN:   (  ) No rashes or lesions     (  ) maculopapular rash     (  ) pustules     (  ) vesicles     (  ) ulcer     (  ) ecchymosis     (specify location:     )    AMPAC score:    (  ) Indwelling Madsen Catheter:   Date insterted:    Reason (  ) Critical illness     (  ) urinary retention    (  ) Accurate Ins/Outs Monitoring     (  ) CMO patient    (  ) Central Line:   Date inserted:  Location: (  ) Right IJ     (  ) Left IJ     (  ) Right Fem     (  ) Left Fem    (  ) SPC        (  ) pigtail       (  ) PEG tube       (  ) colostomy       (  ) jejunostomy  (  ) U-Dall    LABS:                        9.2    7.64  )-----------( 483      ( 20 May 2024 06:55 )             30.6     05-20    140  |  100  |  15  ----------------------------<  112<H>  4.7   |  31  |  0.5<L>    Ca    8.9      20 May 2024 06:55    TPro  6.3  /  Alb  2.8<L>  /  TBili  <0.2  /  DBili  x   /  AST  18  /  ALT  15  /  AlkPhos  104  05-20      Urinalysis Basic - ( 20 May 2024 06:55 )    Color: x / Appearance: x / SG: x / pH: x  Gluc: 112 mg/dL / Ketone: x  / Bili: x / Urobili: x   Blood: x / Protein: x / Nitrite: x   Leuk Esterase: x / RBC: x / WBC x   Sq Epi: x / Non Sq Epi: x / Bacteria: x            Culture - Blood (collected 18 May 2024 22:29)  Source: .Blood Blood  Gram Stain (20 May 2024 01:06):    Growth in aerobic bottle: Gram Positive Cocci in Clusters  Preliminary Report (20 May 2024 01:06):    Growth in aerobic bottle: Gram Positive Cocci in Clusters    Culture - Blood (collected 18 May 2024 22:29)  Source: .Blood Blood  Gram Stain (19 May 2024 17:12):    Growth in aerobic bottle: Gram Negative Rods and Gram positive cocci in    pairs  Preliminary Report (20 May 2024 10:25):    Growth in aerobic bottle: Proteus mirabilis    Growth in aerobic bottle: Gram positive cocci in pairs    Direct identification is available within approximately 3-5    hours either by Blood Panel Multiplexed PCR or Direct    MALDI-TOF. Details: [email or web address]    Vancomycin resistance gene detected    When multiple species' of Enterococcus are present, association of a    resistance gene to a specific organism cannot be determined.  Organism: Blood Culture PCR (19 May 2024 20:30)  Organism: Blood Culture PCR (19 May 2024 20:30)          RADIOLOGY:          < from: NM Gastric Emptying Liquid (24 @ 13:35) >    Impression:  Normal emptying of radiolabeled meal from the stomach over time with 42%   retention at 1 hour (normal is < 51%).    --- End of Report ---      < end of copied text >  < from: Xray Chest 1 View- PORTABLE-Routine (24 @ 04:21) >  IMPRESSION:    Stable bilateral opacities, right greater than left.    --- End of Report ---    < end of copied text >  < from: CT Chest w/ IV Cont (24 @ 23:32) >  IMPRESSION:     Patulous distended fluid-filled esophagus associated with 7.4 cm hiatal   hernia (at risk for aspiration)..    Bilateral areas of consolidation both lower lobes.  Patchy opacity both lungs, right greater than left, similar to earlier   study. Interval resolution of previous small effusions.    Markedly distended urinary bladder with dependent debris, possible   hemorrhage. No bladder calculi. (). Correlate with urinalysis.      --- End of Report ---    < end of copied text >  < from: CT Abdomen and Pelvis w/ IV Cont (24 @ 23:32) >  IMPRESSION:     Patulous distended fluid-filled esophagus associated with 7.4 cm hiatal   hernia (at risk for aspiration)..    Bilateral areas of consolidation both lower lobes.  Patchy opacity both lungs, right greater than left, similar to earlier   study. Interval resolution of previous small effusions.    Markedly distended urinary bladder with dependent debris, possible   hemorrhage. No bladder calculi. (/). Correlate with urinalysis.      --- End of Report ---    < end of copied text >      ASSESSMENT/PLAN:     58 year old female with PMH of Down Syndrome, cerebral palsy, seizure disorder presents from [facility] for respiratory distress. Admitted to SDU for severe sepsis & acute hypoxemic respiraotry failure [facility] / recurrent CAP.     #Acute Hypoxemic Respiratory Failure  #Severe sepsis with Septic Shock  #Possible Recurrent Aspiration (Despite having PEG)  #Chronic Hypotension  - COVID/RVP/MRSA negative  - c/w midodrine 5 mg TID  - target MAP > 60  - BCx 4/15: S. capitis  BCx 4/15 & : NGTD  - scopolamine for secretions  - aspiration precautions  - CT chest: bilateral opacities  -  & : spiked fever & WBCs  - BCx : NGTD  - BCX : NGTD  - -: overnight tachycardic 140s, hypotensive, febrile, given 1L LR bolus & then 500 cc for elevated lactate  - Spiked fever on   - s/p meropenem 1g q8h x multiple courses  - reduced volume of diet bolus to 250 & meropenem resumed, ID recalled on    - started on IV Daptomycin 450 mg q24h for E. faecium, E. faecalis, MRSE bacteremia   - Baseline & Weekly CK  - TTE pending  - f/u ESR/CRP     #Rash  Contact Dermatitis likely  CHG - improving  - hold CHG wash  - c/w topical care & monitoring    #Episodes of Vomiting c/b Aspiration  - recurrent aspiration risk due to hiatal hernia  - on PEG feed, lower volume  - gastric emptying study negative    #B/l Foot Ulcers  - no abscess/cellulitis  - off loading  - wound care    #Seizure Disorder  #Down Syndrome  #Nonverbal   #Bedbound  #PEG tube  - c/w keppra  - CTSx recalled, G-tube unclogged                                           --------------------------------------------------------------    # DVT prophylaxis: Lovenox  # GI prophylaxis: PPI  # Diet:   # Activity:   # Code status: Full Code  # Disposition:    Pendin) TTE  2) ESR/CRP 24H events:    Patient is a 58y old Female who presents with a chief complaint of Pneumonia (19 May 2024 13:24)    Primary diagnosis of Acute sepsis        Today is hospital day 35d. This morning patient was seen and examined at bedside, resting comfortably in bed.    No acute or major events overnight.    Code Status:    Family communication:  Contact date:  Name of person contacted:  Relationship to patient:  Communication details:  What matters most:    PAST MEDICAL & SURGICAL HISTORY  Down syndrome    Osteoporosis    Mild anemia    Neuropathy    S/P debridement  of R hip on 3/2/21      SOCIAL HISTORY:  Social History:      ALLERGIES:  chlorhexidine containing compounds (Rash (Mild to Mod))    MEDICATIONS:  STANDING MEDICATIONS  albuterol/ipratropium for Nebulization 3 milliLiter(s) Nebulizer every 6 hours  AQUAPHOR (petrolatum Ointment) 1 Application(s) Topical two times a day  artificial  tears Solution 1 Drop(s) Both EYES two times a day  calcium carbonate   1250 mG (OsCal) 1 Tablet(s) Oral daily  DAPTOmycin IVPB 450 milliGRAM(s) IV Intermittent every 24 hours  enoxaparin Injectable 30 milliGRAM(s) SubCutaneous every 24 hours  famotidine    Tablet 20 milliGRAM(s) Oral two times a day  gabapentin Solution 300 milliGRAM(s) Oral two times a day  levETIRAcetam  Solution 750 milliGRAM(s) Oral two times a day  meropenem  IVPB 1000 milliGRAM(s) IV Intermittent every 8 hours  midodrine 5 milliGRAM(s) Oral every 8 hours  raloxifene 60 milliGRAM(s) Oral daily  scopolamine 1 mG/72 Hr(s) Patch 1 Patch Transdermal every 72 hours  senna 2 Tablet(s) Oral at bedtime    PRN MEDICATIONS  acetaminophen     Tablet .. 650 milliGRAM(s) Oral every 6 hours PRN  melatonin 3 milliGRAM(s) Oral at bedtime PRN  ondansetron Injectable 4 milliGRAM(s) IV Push every 8 hours PRN    VITALS:   T(F): 98.9  HR: 80  BP: 110/60  RR: 18  SpO2: 96%    PHYSICAL EXAM:  GENERAL:   ( x ) NAD, lying in bed comfortably     (  ) obtunded     (  ) lethargic     (  ) somnolent    HEAD:   ( x ) Atraumatic     (  ) hematoma     (  ) laceration (specify location:       )     NECK:  ( x ) Supple     (  ) neck stiffness     (  ) nuchal rigidity     (  )  no JVD     (  ) JVD present ( -- cm)    HEART:  Rate -->     (  ) normal rate     (  ) bradycardic     (  ) tachycardic  Rhythm -->     ( x ) regular     (  ) regularly irregular     (  ) irregularly irregular  Murmurs -->     (  ) normal s1s2     (  ) systolic murmur     (  ) diastolic murmur     (  ) continuous murmur      (  ) S3 present     (  ) S4 present    LUNGS:   (x  )Unlabored respirations     (  ) tachypnea  ( x ) B/L air entry     (  ) decreased breath sounds in:  (location     )    (  ) no adventitious sound     (  ) crackles     (  ) wheezing      (  ) rhonchi      (specify location:       )  (  ) chest wall tenderness (specify location:       )    ABDOMEN:   ( x ) Soft     (  ) tense   |   (  x) nondistended     (  ) distended   |   (  ) +BS     (  ) hypoactive bowel sounds     (  ) hyperactive bowel sounds  ( x ) nontender     (  ) RUQ tenderness     (  ) RLQ tenderness     (  ) LLQ tenderness     (  ) epigastric tenderness     (  ) diffuse tenderness  (  ) Splenomegaly      (  ) Hepatomegaly      (  ) Jaundice     (  ) ecchymosis     EXTREMITIES:  (  ) Normal     (  ) Rash     (  ) ecchymosis     (  ) varicose veins      (  ) pitting edema     (  ) non-pitting edema   (  ) ulceration     (  ) gangrene:     (location:     )    NERVOUS SYSTEM:    (  ) A&Ox3     (  ) confused     (  ) lethargic  CN II-XII:     (  ) Intact     (  ) deficits found     (Specify:     )   Upper extremities:     (  ) no sensorimotor deficits     (  ) weakness     (  ) loss of proprioception/vibration     (  ) loss of touch/temperature (specify:    )  Lower extremities:     (  ) no sensorimotor deficits     (  ) weakness     (  ) loss of proprioception/vibration     (  ) loss of touch/temperature (specify:    )    SKIN:   (  ) No rashes or lesions     (  ) maculopapular rash     (  ) pustules     (  ) vesicles     (  ) ulcer     (  ) ecchymosis     (specify location:     )    AMPAC score:    (  ) Indwelling Madsen Catheter:   Date insterted:    Reason (  ) Critical illness     (  ) urinary retention    (  ) Accurate Ins/Outs Monitoring     (  ) CMO patient    (  ) Central Line:   Date inserted:  Location: (  ) Right IJ     (  ) Left IJ     (  ) Right Fem     (  ) Left Fem    (  ) SPC        (  ) pigtail       (  ) PEG tube       (  ) colostomy       (  ) jejunostomy  (  ) U-Dall    LABS:                        9.2    7.64  )-----------( 483      ( 20 May 2024 06:55 )             30.6     05-20    140  |  100  |  15  ----------------------------<  112<H>  4.7   |  31  |  0.5<L>    Ca    8.9      20 May 2024 06:55    TPro  6.3  /  Alb  2.8<L>  /  TBili  <0.2  /  DBili  x   /  AST  18  /  ALT  15  /  AlkPhos  104  05-20      Urinalysis Basic - ( 20 May 2024 06:55 )    Color: x / Appearance: x / SG: x / pH: x  Gluc: 112 mg/dL / Ketone: x  / Bili: x / Urobili: x   Blood: x / Protein: x / Nitrite: x   Leuk Esterase: x / RBC: x / WBC x   Sq Epi: x / Non Sq Epi: x / Bacteria: x            Culture - Blood (collected 18 May 2024 22:29)  Source: .Blood Blood  Gram Stain (20 May 2024 01:06):    Growth in aerobic bottle: Gram Positive Cocci in Clusters  Preliminary Report (20 May 2024 01:06):    Growth in aerobic bottle: Gram Positive Cocci in Clusters    Culture - Blood (collected 18 May 2024 22:29)  Source: .Blood Blood  Gram Stain (19 May 2024 17:12):    Growth in aerobic bottle: Gram Negative Rods and Gram positive cocci in    pairs  Preliminary Report (20 May 2024 10:25):    Growth in aerobic bottle: Proteus mirabilis    Growth in aerobic bottle: Gram positive cocci in pairs    Direct identification is available within approximately 3-5    hours either by Blood Panel Multiplexed PCR or Direct    MALDI-TOF. Details: [email or web address]    Vancomycin resistance gene detected    When multiple species' of Enterococcus are present, association of a    resistance gene to a specific organism cannot be determined.  Organism: Blood Culture PCR (19 May 2024 20:30)  Organism: Blood Culture PCR (19 May 2024 20:30)          RADIOLOGY:          < from: NM Gastric Emptying Liquid (24 @ 13:35) >    Impression:  Normal emptying of radiolabeled meal from the stomach over time with 42%   retention at 1 hour (normal is < 51%).    --- End of Report ---      < end of copied text >  < from: Xray Chest 1 View- PORTABLE-Routine (24 @ 04:21) >  IMPRESSION:    Stable bilateral opacities, right greater than left.    --- End of Report ---    < end of copied text >  < from: CT Chest w/ IV Cont (24 @ 23:32) >  IMPRESSION:     Patulous distended fluid-filled esophagus associated with 7.4 cm hiatal   hernia (at risk for aspiration)..    Bilateral areas of consolidation both lower lobes.  Patchy opacity both lungs, right greater than left, similar to earlier   study. Interval resolution of previous small effusions.    Markedly distended urinary bladder with dependent debris, possible   hemorrhage. No bladder calculi. (). Correlate with urinalysis.      --- End of Report ---    < end of copied text >  < from: CT Abdomen and Pelvis w/ IV Cont (24 @ 23:32) >  IMPRESSION:     Patulous distended fluid-filled esophagus associated with 7.4 cm hiatal   hernia (at risk for aspiration)..    Bilateral areas of consolidation both lower lobes.  Patchy opacity both lungs, right greater than left, similar to earlier   study. Interval resolution of previous small effusions.    Markedly distended urinary bladder with dependent debris, possible   hemorrhage. No bladder calculi. (/). Correlate with urinalysis.      --- End of Report ---    < end of copied text >      ASSESSMENT/PLAN:     58 year old female with PMH of Down Syndrome, cerebral palsy, seizure disorder presents from [facility] for respiratory distress. Admitted to SDU for severe sepsis & acute hypoxemic respiraotry failure [facility] / recurrent CAP.     #Acute Hypoxemic Respiratory Failure  #Severe sepsis with Septic Shock  #Possible Recurrent Aspiration (Despite having PEG)  #Chronic Hypotension  - COVID/RVP/MRSA negative  - c/w midodrine 5 mg TID  - target MAP > 60  - BCx 4/15: S. capitis  BCx 4/15 & : NGTD  - scopolamine for secretions  - aspiration precautions  - CT chest: bilateral opacities  -  & : spiked fever & WBCs  - BCx : NGTD  - BCX : NGTD  - -: overnight tachycardic 140s, hypotensive, febrile, given 1L LR bolus & then 500 cc for elevated lactate  - Spiked fever on   - s/p meropenem 1g q8h x multiple courses  - reduced volume of diet bolus to 250 & meropenem resumed, ID recalled on    - started on IV Daptomycin 450 mg q24h for E. faecium, E. faecalis, MRSE bacteremia   - Baseline & Weekly CK  - TTE pending  - f/u ESR/CRP   - f/u UA, RUQ US, BCx    #Rash 2/2 Contact Dermatitis likely / CHG - improving  - hold CHG wash  - c/w topical care & monitoring    #Episodes of Vomiting c/b Aspiration  - recurrent aspiration risk due to hiatal hernia  - on PEG feed, lower volume  - gastric emptying study negative    #B/l Foot Ulcers  - no abscess/cellulitis  - off loading  - wound care    #Seizure Disorder  #Down Syndrome  #Nonverbal   #Bedbound  #PEG tube  - c/w keppra  - c/w tube feeds                                           --------------------------------------------------------------    # DVT prophylaxis: Lovenox  # GI prophylaxis: PPI  # Diet:   # Activity:   # Code status: Full Code  # Disposition:    Pendin) TTE  2) ESR/CRP  3) UA, RUQ US, BCx

## 2024-05-20 NOTE — PROGRESS NOTE ADULT - ASSESSMENT
· Assessment	  58 MARÍA w a PMH of Trisomy 21, a recent admission [id number] 8 for RSV PNA w MICU stay (never intubated), nonverbal at baseline, hypotension, cerebral palsey w seizure disorder, ESBL isolated from ulcer of rt foot [facility]. Patient SOA w , RR 22 and febrile to 102.8 rectally. Patient also hypotensive to 82/51. Labs notable for leukocytosis of 12.25 w 3.6% bands, large platelets and a compensated Respiratory acidosis. Imaging notable for a Rt Mid lobe ground glass opacification. Patient MRSA Negative w RVP negative for COVID, RSV and Flu.       IMPRESSION/RECOMMENDATIONS  #Aspiration with suspected GNR  pneumoniaa - treated with course of pneuomnia   #Fever 5/18  with polymicrobial bacteremia  - Blood Cx 5/18 - GPC in clusters, E faecalis, E faecium, Proteus mirabilis, ESBL Gene dtected     #Feet with pressure related ulcers. No abscess/cellulitis    Recommendations  - Polymicrobial bacteremia in setting of recent rash -- expect possible skin source given polymicrobial growth but no clear signs of phlebitis -- Abdomen is soft; Foot Ulcers bilaterally, but do not appear grossly infected (right foot wound with full thickness ulcer but no surrounding erythema)   - start daptomycin 10 mg/kg q 24 hours   -- check CK now and weekly   - check ESR/CRP   - continue meropenempenem 1g q 8 hours   - check TTE   - trend fever curve     Please call or message on Microsoft Teams if with any questions.  Spectra [phone]

## 2024-05-21 LAB
-  AMPICILLIN/SULBACTAM: SIGNIFICANT CHANGE UP
-  AMPICILLIN: SIGNIFICANT CHANGE UP
-  AZTREONAM: SIGNIFICANT CHANGE UP
-  CEFAZOLIN: SIGNIFICANT CHANGE UP
-  CEFEPIME: SIGNIFICANT CHANGE UP
-  CEFTRIAXONE: SIGNIFICANT CHANGE UP
-  CIPROFLOXACIN: SIGNIFICANT CHANGE UP
-  ERTAPENEM: SIGNIFICANT CHANGE UP
-  GENTAMICIN: SIGNIFICANT CHANGE UP
-  LEVOFLOXACIN: SIGNIFICANT CHANGE UP
-  MEROPENEM: SIGNIFICANT CHANGE UP
-  PIPERACILLIN/TAZOBACTAM: SIGNIFICANT CHANGE UP
-  TOBRAMYCIN: SIGNIFICANT CHANGE UP
-  TRIMETHOPRIM/SULFAMETHOXAZOLE: SIGNIFICANT CHANGE UP
ANION GAP SERPL CALC-SCNC: 11 MMOL/L — SIGNIFICANT CHANGE UP (ref 7–14)
APPEARANCE UR: ABNORMAL
BASOPHILS # BLD AUTO: 0.06 K/UL — SIGNIFICANT CHANGE UP (ref 0–0.2)
BASOPHILS NFR BLD AUTO: 0.6 % — SIGNIFICANT CHANGE UP (ref 0–1)
BILIRUB UR-MCNC: NEGATIVE — SIGNIFICANT CHANGE UP
BUN SERPL-MCNC: 16 MG/DL — SIGNIFICANT CHANGE UP (ref 10–20)
CALCIUM SERPL-MCNC: 8.6 MG/DL — SIGNIFICANT CHANGE UP (ref 8.4–10.4)
CHLORIDE SERPL-SCNC: 102 MMOL/L — SIGNIFICANT CHANGE UP (ref 98–110)
CO2 SERPL-SCNC: 30 MMOL/L — SIGNIFICANT CHANGE UP (ref 17–32)
COLOR SPEC: YELLOW — SIGNIFICANT CHANGE UP
CREAT SERPL-MCNC: 0.5 MG/DL — LOW (ref 0.7–1.5)
CRP SERPL-MCNC: 31.9 MG/L — HIGH
CULTURE RESULTS: ABNORMAL
DIFF PNL FLD: NEGATIVE — SIGNIFICANT CHANGE UP
EGFR: 109 ML/MIN/1.73M2 — SIGNIFICANT CHANGE UP
EOSINOPHIL # BLD AUTO: 0.26 K/UL — SIGNIFICANT CHANGE UP (ref 0–0.7)
EOSINOPHIL NFR BLD AUTO: 2.7 % — SIGNIFICANT CHANGE UP (ref 0–8)
ERYTHROCYTE [SEDIMENTATION RATE] IN BLOOD: 13 MM/HR — SIGNIFICANT CHANGE UP (ref 0–20)
GLUCOSE BLDC GLUCOMTR-MCNC: 105 MG/DL — HIGH (ref 70–99)
GLUCOSE BLDC GLUCOMTR-MCNC: 125 MG/DL — HIGH (ref 70–99)
GLUCOSE BLDC GLUCOMTR-MCNC: 144 MG/DL — HIGH (ref 70–99)
GLUCOSE SERPL-MCNC: 104 MG/DL — HIGH (ref 70–99)
GLUCOSE UR QL: NEGATIVE MG/DL — SIGNIFICANT CHANGE UP
HCT VFR BLD CALC: 30.8 % — LOW (ref 37–47)
HGB BLD-MCNC: 9.4 G/DL — LOW (ref 12–16)
IMM GRANULOCYTES NFR BLD AUTO: 0.4 % — HIGH (ref 0.1–0.3)
KETONES UR-MCNC: NEGATIVE MG/DL — SIGNIFICANT CHANGE UP
LEUKOCYTE ESTERASE UR-ACNC: ABNORMAL
LYMPHOCYTES # BLD AUTO: 1.44 K/UL — SIGNIFICANT CHANGE UP (ref 1.2–3.4)
LYMPHOCYTES # BLD AUTO: 15 % — LOW (ref 20.5–51.1)
MAGNESIUM SERPL-MCNC: 2.2 MG/DL — SIGNIFICANT CHANGE UP (ref 1.8–2.4)
MCHC RBC-ENTMCNC: 29.4 PG — SIGNIFICANT CHANGE UP (ref 27–31)
MCHC RBC-ENTMCNC: 30.5 G/DL — LOW (ref 32–37)
MCV RBC AUTO: 96.3 FL — SIGNIFICANT CHANGE UP (ref 81–99)
METHOD TYPE: SIGNIFICANT CHANGE UP
MONOCYTES # BLD AUTO: 0.44 K/UL — SIGNIFICANT CHANGE UP (ref 0.1–0.6)
MONOCYTES NFR BLD AUTO: 4.6 % — SIGNIFICANT CHANGE UP (ref 1.7–9.3)
NEUTROPHILS # BLD AUTO: 7.39 K/UL — HIGH (ref 1.4–6.5)
NEUTROPHILS NFR BLD AUTO: 76.7 % — HIGH (ref 42.2–75.2)
NITRITE UR-MCNC: NEGATIVE — SIGNIFICANT CHANGE UP
NRBC # BLD: 0 /100 WBCS — SIGNIFICANT CHANGE UP (ref 0–0)
PH UR: 7.5 — SIGNIFICANT CHANGE UP (ref 5–8)
PLATELET # BLD AUTO: 496 K/UL — HIGH (ref 130–400)
PMV BLD: 9.9 FL — SIGNIFICANT CHANGE UP (ref 7.4–10.4)
POTASSIUM SERPL-MCNC: 4.8 MMOL/L — SIGNIFICANT CHANGE UP (ref 3.5–5)
POTASSIUM SERPL-SCNC: 4.8 MMOL/L — SIGNIFICANT CHANGE UP (ref 3.5–5)
PROT UR-MCNC: 30 MG/DL
RBC # BLD: 3.2 M/UL — LOW (ref 4.2–5.4)
RBC # FLD: 20.7 % — HIGH (ref 11.5–14.5)
SODIUM SERPL-SCNC: 143 MMOL/L — SIGNIFICANT CHANGE UP (ref 135–146)
SP GR SPEC: 1.02 — SIGNIFICANT CHANGE UP (ref 1–1.03)
SPECIMEN SOURCE: SIGNIFICANT CHANGE UP
UROBILINOGEN FLD QL: 0.2 MG/DL — SIGNIFICANT CHANGE UP (ref 0.2–1)
WBC # BLD: 9.63 K/UL — SIGNIFICANT CHANGE UP (ref 4.8–10.8)
WBC # FLD AUTO: 9.63 K/UL — SIGNIFICANT CHANGE UP (ref 4.8–10.8)

## 2024-05-21 PROCEDURE — 76705 ECHO EXAM OF ABDOMEN: CPT | Mod: 26

## 2024-05-21 PROCEDURE — 99232 SBSQ HOSP IP/OBS MODERATE 35: CPT

## 2024-05-21 RX ADMIN — MEROPENEM 100 MILLIGRAM(S): 1 INJECTION INTRAVENOUS at 13:27

## 2024-05-21 RX ADMIN — RALOXIFENE HYDROCHLORIDE 60 MILLIGRAM(S): 60 TABLET, COATED ORAL at 11:12

## 2024-05-21 RX ADMIN — SCOPALAMINE 1 PATCH: 1 PATCH, EXTENDED RELEASE TRANSDERMAL at 06:45

## 2024-05-21 RX ADMIN — MIDODRINE HYDROCHLORIDE 5 MILLIGRAM(S): 2.5 TABLET ORAL at 06:23

## 2024-05-21 RX ADMIN — Medication 1 DROP(S): at 18:39

## 2024-05-21 RX ADMIN — MIDODRINE HYDROCHLORIDE 5 MILLIGRAM(S): 2.5 TABLET ORAL at 21:53

## 2024-05-21 RX ADMIN — FAMOTIDINE 20 MILLIGRAM(S): 10 INJECTION INTRAVENOUS at 18:38

## 2024-05-21 RX ADMIN — DAPTOMYCIN 118 MILLIGRAM(S): 500 INJECTION, POWDER, LYOPHILIZED, FOR SOLUTION INTRAVENOUS at 09:41

## 2024-05-21 RX ADMIN — LEVETIRACETAM 750 MILLIGRAM(S): 250 TABLET, FILM COATED ORAL at 06:23

## 2024-05-21 RX ADMIN — LEVETIRACETAM 750 MILLIGRAM(S): 250 TABLET, FILM COATED ORAL at 18:38

## 2024-05-21 RX ADMIN — MIDODRINE HYDROCHLORIDE 5 MILLIGRAM(S): 2.5 TABLET ORAL at 13:28

## 2024-05-21 RX ADMIN — Medication 3 MILLILITER(S): at 13:19

## 2024-05-21 RX ADMIN — Medication 1 APPLICATION(S): at 06:24

## 2024-05-21 RX ADMIN — ENOXAPARIN SODIUM 30 MILLIGRAM(S): 100 INJECTION SUBCUTANEOUS at 13:27

## 2024-05-21 RX ADMIN — Medication 3 MILLILITER(S): at 19:35

## 2024-05-21 RX ADMIN — SENNA PLUS 2 TABLET(S): 8.6 TABLET ORAL at 21:52

## 2024-05-21 RX ADMIN — Medication 1 TABLET(S): at 11:11

## 2024-05-21 RX ADMIN — GABAPENTIN 300 MILLIGRAM(S): 400 CAPSULE ORAL at 06:46

## 2024-05-21 RX ADMIN — Medication 1 DROP(S): at 06:25

## 2024-05-21 RX ADMIN — FAMOTIDINE 20 MILLIGRAM(S): 10 INJECTION INTRAVENOUS at 06:23

## 2024-05-21 RX ADMIN — SCOPALAMINE 1 PATCH: 1 PATCH, EXTENDED RELEASE TRANSDERMAL at 06:22

## 2024-05-21 RX ADMIN — MEROPENEM 100 MILLIGRAM(S): 1 INJECTION INTRAVENOUS at 21:52

## 2024-05-21 RX ADMIN — Medication 1 APPLICATION(S): at 18:39

## 2024-05-21 RX ADMIN — MEROPENEM 100 MILLIGRAM(S): 1 INJECTION INTRAVENOUS at 06:25

## 2024-05-21 RX ADMIN — Medication 3 MILLILITER(S): at 08:16

## 2024-05-21 RX ADMIN — GABAPENTIN 300 MILLIGRAM(S): 400 CAPSULE ORAL at 18:38

## 2024-05-21 NOTE — CHART NOTE - NSCHARTNOTESELECT_GEN_ALL_CORE
Nutrition/Event Note
Nutrition - RD Follow Up/Nutrition Services
Nutrition - RD Follow Up/Nutrition Services
Transfer Note

## 2024-05-21 NOTE — CHART NOTE - NSCHARTNOTEFT_GEN_A_CORE
Registered Dietitian Follow-Up     Patient Profile Reviewed                           Yes [x]   No []     Nutrition History Previously Obtained        Yes []  No [x]       Pertinent Subjective Information:  RD spoke with RN - patient is tolerating tube feeds. Last BM this morning.      Pertinent Medical Interventions:  Acute hypoxemic respiratory failure; severe sepsis with septic shock; Possible recurrent aspiration (despite having PEG); Chronic hypotension; Episodes of vomiting c/b aspiration - recurrent aspiration risk due to hiatal hernia - on PEG feed, lower volume; gastric emptying study - negative; b/l foot ulcers; Seizure disorder; Down syndrome; nonverbal; bedbound;  Diet order:     Anthropometrics:  Height: 134 cm   Weight: 42.7 kg   BMI: 23.8   IBW: 27.3 kg     Diet, NPO:   Tube Feeding Modality: Gastrostomy  Jevity 1.2 Juventino (JEVITY1.2RTH)  Total Volume for 24 Hours (mL): 1000  Bolus  Total # of Feeds: 4  Tube Feed Frequency: Every 6 hours   Tube Feed Start Time: 18:00  Bolus Feed Rate (mL per Hour): 250   Bolus Feed Duration (in Hours): 1  Free Water Flush   Total Volume of Bolus (mL):  250  Free Water Flush Instructions:  100 mL water flushes pre/post feeds (05-15-24 @ 16:06) [Active]    MEDICATIONS  (STANDING):  albuterol/ipratropium for Nebulization 3 milliLiter(s) Nebulizer every 6 hours  AQUAPHOR (petrolatum Ointment) 1 Application(s) Topical two times a day  artificial  tears Solution 1 Drop(s) Both EYES two times a day  calcium carbonate   1250 mG (OsCal) 1 Tablet(s) Oral daily  DAPTOmycin IVPB 450 milliGRAM(s) IV Intermittent every 24 hours  enoxaparin Injectable 30 milliGRAM(s) SubCutaneous every 24 hours  famotidine    Tablet 20 milliGRAM(s) Oral two times a day  gabapentin Solution 300 milliGRAM(s) Oral two times a day  levETIRAcetam  Solution 750 milliGRAM(s) Oral two times a day  meropenem  IVPB 1000 milliGRAM(s) IV Intermittent every 8 hours  midodrine 5 milliGRAM(s) Oral every 8 hours  raloxifene 60 milliGRAM(s) Oral daily  scopolamine 1 mG/72 Hr(s) Patch 1 Patch Transdermal every 72 hours  senna 2 Tablet(s) Oral at bedtime    MEDICATIONS  (PRN):  acetaminophen     Tablet .. 650 milliGRAM(s) Oral every 6 hours PRN Temp greater or equal to 38C (100.4F), Mild Pain (1 - 3)  melatonin 3 milliGRAM(s) Oral at bedtime PRN Insomnia  ondansetron Injectable 4 milliGRAM(s) IV Push every 8 hours PRN Nausea and/or Vomiting    Pertinent Labs: 05-21 @ 07:06: Na 143, BUN 16, Cr 0.5<L>, <H>, K+ 4.8, Phos --, Mg 2.2, Alk Phos --, ALT/SGPT --, AST/SGOT --, HbA1c --    Finger Sticks:  POCT Blood Glucose.: 105 mg/dL (05-21 @ 11:17)  POCT Blood Glucose.: 144 mg/dL (05-21 @ 07:25)  POCT Blood Glucose.: 137 mg/dL (05-20 @ 21:49)  POCT Blood Glucose.: 117 mg/dL (05-20 @ 17:05)    Physical Findings:  - Appearance: obtunded, unable to speak   - GI function: last BM 5/21   - Tubes: +PEG   - Oral/Mouth cavity: NPO w/ EN via PEG   - Skin: Pressure injuries: R heel - unstageable; R medial foot - unstageable; Left proximal foot - unstageable; Left medial foot unstageable; left later foot - unstageable  -Edema: 2+ edema - left and right hand      Nutrition Requirements  Weight Used: 44.8 kg - per RD assessment (4/20)      Estimated Energy Needs    Continue [x]  Adjust []  1013-1266kcal/day (using 1.2-1.5AF due to PI vs. suspected PCM)     Estimated Protein Needs    Continue [x]  Adjust []  54-68g/day (using 1.2-1.5g/kg -- same reason as above)      Estimated Fluid Needs        Continue [x]  Adjust []  1344 - 1568 mL/day (30 - 35 mL/kg)      Nutrient Intake:  EN regimen provides: 1000 mL total volume, 1200 kcal, 55.5 gm Protein, 810 mL free water from formula + 100 pre/post feeds = 1610 mL total water      [x] Previous Nutrition Diagnosis:  Unintended weight loss            [x] Ongoing          [] Resolved     Nutrition Intervention:  EN, coordination of care     Goal/Expected Outcome:   EN to meet >85% and <105% of estimated needs within 3-5 days     Indicator/Monitoring:   EN tolerance, BM, GI s/s, weight, labs, skin status, NFPE      Recommendation:  1)Would recommend to advance feeds 120 mL q6hrs each day (as tolerated) to goal rate of 360 mL q8hrs (3x)   so patient can meet at least >85% of estimated kcal and protein needs    This would provide: 1080 mL total volume, 1296 kcal, 60 gm Protein, 875 mL free water from formula + recommend 70 mL water flushes pre/post feeds = 1435 mL total water     2) monitor BM, GI s/s   3) monitor electrolytes, BG, renal profile  4) Maintain all aspiration precautions - Maintain HOB >45 degrees    Patient is at high nutrition risk, RD to f/u in 3-5 days or PRN  RD to remain available: Tania Mclean RD [phone] or via Teams Registered Dietitian Follow-Up     Patient Profile Reviewed                           Yes [x]   No []     Nutrition History Previously Obtained        Yes []  No [x]       Pertinent Subjective Information:  RD spoke with RN - patient is tolerating tube feeds. Last BM this morning.      Pertinent Medical Interventions:  Acute hypoxemic respiratory failure; severe sepsis with septic shock; Possible recurrent aspiration (despite having PEG); Chronic hypotension; Episodes of vomiting c/b aspiration - recurrent aspiration risk due to hiatal hernia - on PEG feed, lower volume; gastric emptying study - negative; b/l foot ulcers; Seizure disorder; Down syndrome; nonverbal; bedbound;  Diet order:     Anthropometrics:  Height: 134 cm   Weight: 42.7 kg   BMI: 23.8   IBW: 27.3 kg     Diet, NPO:   Tube Feeding Modality: Gastrostomy  Jevity 1.2 Juventino (JEVITY1.2RTH)  Total Volume for 24 Hours (mL): 1000  Bolus  Total # of Feeds: 4  Tube Feed Frequency: Every 6 hours   Tube Feed Start Time: 18:00  Bolus Feed Rate (mL per Hour): 250   Bolus Feed Duration (in Hours): 1  Free Water Flush   Total Volume of Bolus (mL):  250  Free Water Flush Instructions:  100 mL water flushes pre/post feeds (05-15-24 @ 16:06) [Active]    MEDICATIONS  (STANDING):  albuterol/ipratropium for Nebulization 3 milliLiter(s) Nebulizer every 6 hours  AQUAPHOR (petrolatum Ointment) 1 Application(s) Topical two times a day  artificial  tears Solution 1 Drop(s) Both EYES two times a day  calcium carbonate   1250 mG (OsCal) 1 Tablet(s) Oral daily  DAPTOmycin IVPB 450 milliGRAM(s) IV Intermittent every 24 hours  enoxaparin Injectable 30 milliGRAM(s) SubCutaneous every 24 hours  famotidine    Tablet 20 milliGRAM(s) Oral two times a day  gabapentin Solution 300 milliGRAM(s) Oral two times a day  levETIRAcetam  Solution 750 milliGRAM(s) Oral two times a day  meropenem  IVPB 1000 milliGRAM(s) IV Intermittent every 8 hours  midodrine 5 milliGRAM(s) Oral every 8 hours  raloxifene 60 milliGRAM(s) Oral daily  scopolamine 1 mG/72 Hr(s) Patch 1 Patch Transdermal every 72 hours  senna 2 Tablet(s) Oral at bedtime    MEDICATIONS  (PRN):  acetaminophen     Tablet .. 650 milliGRAM(s) Oral every 6 hours PRN Temp greater or equal to 38C (100.4F), Mild Pain (1 - 3)  melatonin 3 milliGRAM(s) Oral at bedtime PRN Insomnia  ondansetron Injectable 4 milliGRAM(s) IV Push every 8 hours PRN Nausea and/or Vomiting    Pertinent Labs: 05-21 @ 07:06: Na 143, BUN 16, Cr 0.5<L>, <H>, K+ 4.8, Phos --, Mg 2.2, Alk Phos --, ALT/SGPT --, AST/SGOT --, HbA1c --    Finger Sticks:  POCT Blood Glucose.: 105 mg/dL (05-21 @ 11:17)  POCT Blood Glucose.: 144 mg/dL (05-21 @ 07:25)  POCT Blood Glucose.: 137 mg/dL (05-20 @ 21:49)  POCT Blood Glucose.: 117 mg/dL (05-20 @ 17:05)    Physical Findings:  - Appearance: obtunded, unable to speak   - GI function: last BM 5/21   - Tubes: +PEG   - Oral/Mouth cavity: NPO w/ EN via PEG   - Skin: Pressure injuries: R heel - unstageable; R medial foot - unstageable; Left proximal foot - unstageable; Left medial foot unstageable; left later foot - unstageable  -Edema: 2+ edema - left and right hand      Nutrition Requirements  Weight Used: 44.8 kg - per RD assessment (4/20)      Estimated Energy Needs    Continue [x]  Adjust []  1013-1266kcal/day (using 1.2-1.5AF due to PI vs. suspected PCM)     Estimated Protein Needs    Continue [x]  Adjust []  54-68g/day (using 1.2-1.5g/kg -- same reason as above)      Estimated Fluid Needs        Continue [x]  Adjust []  1344 - 1568 mL/day (30 - 35 mL/kg)      Nutrient Intake:  EN regimen provides: 1000 mL total volume, 1200 kcal, 55.5 gm Protein, 810 mL free water from formula + 100 pre/post feeds = 1610 mL total water      [x] Previous Nutrition Diagnosis:  Unintended weight loss            [x] Ongoing          [] Resolved     Nutrition Intervention:  EN, coordination of care     Goal/Expected Outcome:   EN to meet >85% and <105% of estimated needs within 3-5 days     Indicator/Monitoring:   EN tolerance, BM, GI s/s, weight, labs, skin status, NFPE      Recommendation:  1)Would recommend to advance feeds (as tolerated) to goal rate of 360 mL q8hrs (3x)   so patient can meet at least >85% of estimated kcal and protein needs    This would provide: 1080 mL total volume, 1296 kcal, 60 gm Protein, 875 mL free water from formula + recommend 70 mL water flushes pre/post feeds = 1435 mL total water     2) monitor BM, GI s/s   3) monitor electrolytes, BG, renal profile  4) Maintain all aspiration precautions - Maintain HOB >45 degrees    Patient is at high nutrition risk, RD to f/u in 3-5 days or PRN  RD to remain available: Tania Mclean, RD [phone] or via Teams

## 2024-05-21 NOTE — PROGRESS NOTE ADULT - SUBJECTIVE AND OBJECTIVE BOX
24H events:    Patient is a 58y old Female who presents with a chief complaint of Pneumonia (20 May 2024 12:21)    Primary diagnosis of Acute sepsis        Today is hospital day 36d. This morning patient was seen and examined at bedside, resting comfortably in bed.    No acute or major events overnight.    Code Status:    Family communication:  Contact date:  Name of person contacted:  Relationship to patient:  Communication details:  What matters most:    PAST MEDICAL & SURGICAL HISTORY  Down syndrome    Osteoporosis    Mild anemia    Neuropathy    S/P debridement  of R hip on 3/2/21      SOCIAL HISTORY:  Social History:      ALLERGIES:  chlorhexidine containing compounds (Rash (Mild to Mod))    MEDICATIONS:  STANDING MEDICATIONS  albuterol/ipratropium for Nebulization 3 milliLiter(s) Nebulizer every 6 hours  AQUAPHOR (petrolatum Ointment) 1 Application(s) Topical two times a day  artificial  tears Solution 1 Drop(s) Both EYES two times a day  calcium carbonate   1250 mG (OsCal) 1 Tablet(s) Oral daily  DAPTOmycin IVPB 450 milliGRAM(s) IV Intermittent every 24 hours  enoxaparin Injectable 30 milliGRAM(s) SubCutaneous every 24 hours  famotidine    Tablet 20 milliGRAM(s) Oral two times a day  gabapentin Solution 300 milliGRAM(s) Oral two times a day  levETIRAcetam  Solution 750 milliGRAM(s) Oral two times a day  meropenem  IVPB 1000 milliGRAM(s) IV Intermittent every 8 hours  midodrine 5 milliGRAM(s) Oral every 8 hours  raloxifene 60 milliGRAM(s) Oral daily  scopolamine 1 mG/72 Hr(s) Patch 1 Patch Transdermal every 72 hours  senna 2 Tablet(s) Oral at bedtime    PRN MEDICATIONS  acetaminophen     Tablet .. 650 milliGRAM(s) Oral every 6 hours PRN  melatonin 3 milliGRAM(s) Oral at bedtime PRN  ondansetron Injectable 4 milliGRAM(s) IV Push every 8 hours PRN    VITALS:   T(F): 98.4  HR: 81  BP: 115/76  RR: 18  SpO2: 96%    PHYSICAL EXAM:  GENERAL:   ( x ) NAD, lying in bed comfortably     (  ) obtunded     (  ) lethargic     (  ) somnolent    HEAD:   ( x ) Atraumatic     (  ) hematoma     (  ) laceration (specify location:       )     NECK:  ( x ) Supple     (  ) neck stiffness     (  ) nuchal rigidity     (  )  no JVD     (  ) JVD present ( -- cm)    HEART:  Rate -->     (  ) normal rate     (  ) bradycardic     (  ) tachycardic  Rhythm -->     ( x ) regular     (  ) regularly irregular     (  ) irregularly irregular  Murmurs -->     (  ) normal s1s2     (  ) systolic murmur     (  ) diastolic murmur     (  ) continuous murmur      (  ) S3 present     (  ) S4 present    LUNGS:   (x  )Unlabored respirations     (  ) tachypnea  ( x ) B/L air entry     (  ) decreased breath sounds in:  (location     )    (  ) no adventitious sound     (  ) crackles     (  ) wheezing      (  ) rhonchi      (specify location:       )  (  ) chest wall tenderness (specify location:       )    ABDOMEN:   ( x ) Soft     (  ) tense   |   (  x) nondistended     (  ) distended   |   (  ) +BS     (  ) hypoactive bowel sounds     (  ) hyperactive bowel sounds  ( x ) nontender     (  ) RUQ tenderness     (  ) RLQ tenderness     (  ) LLQ tenderness     (  ) epigastric tenderness     (  ) diffuse tenderness  (  ) Splenomegaly      (  ) Hepatomegaly      (  ) Jaundice     (  ) ecchymosis     EXTREMITIES:  (  ) Normal     (  ) Rash     (  ) ecchymosis     (  ) varicose veins      (  ) pitting edema     (  ) non-pitting edema   (  ) ulceration     (  ) gangrene:     (location:     )    NERVOUS SYSTEM:    (  ) A&Ox3     (  ) confused     (  ) lethargic  CN II-XII:     (  ) Intact     (  ) deficits found     (Specify:     )   Upper extremities:     (  ) no sensorimotor deficits     (  ) weakness     (  ) loss of proprioception/vibration     (  ) loss of touch/temperature (specify:    )  Lower extremities:     (  ) no sensorimotor deficits     (  ) weakness     (  ) loss of proprioception/vibration     (  ) loss of touch/temperature (specify:    )    SKIN:   (  ) No rashes or lesions     (  ) maculopapular rash     (  ) pustules     (  ) vesicles     (  ) ulcer     (  ) ecchymosis     (specify location:     )    AMPAC score:    (  ) Indwelling Madsen Catheter:   Date insterted:    Reason (  ) Critical illness     (  ) urinary retention    (  ) Accurate Ins/Outs Monitoring     (  ) CMO patient    (  ) Central Line:   Date inserted:  Location: (  ) Right IJ     (  ) Left IJ     (  ) Right Fem     (  ) Left Fem    (  ) SPC        (  ) pigtail       (  ) PEG tube       (  ) colostomy       (  ) jejunostomy  (  ) U-Dall    LABS:                        9.2    7.64  )-----------( 483      ( 20 May 2024 06:55 )             30.6     0520    140  |  100  |  15  ----------------------------<  112<H>  4.7   |  31  |  0.5<L>    Ca    8.9      20 May 2024 06:55    TPro  6.3  /  Alb  2.8<L>  /  TBili  <0.2  /  DBili  x   /  AST  18  /  ALT  15  /  AlkPhos  104  05-20      Urinalysis Basic - ( 20 May 2024 06:55 )    Color: x / Appearance: x / SG: x / pH: x  Gluc: 112 mg/dL / Ketone: x  / Bili: x / Urobili: x   Blood: x / Protein: x / Nitrite: x   Leuk Esterase: x / RBC: x / WBC x   Sq Epi: x / Non Sq Epi: x / Bacteria: x        Creatine Kinase, Serum: 84 U/L (24 @ 10:41)      Culture - Blood (collected 18 May 2024 22:29)  Source: .Blood Blood  Gram Stain (20 May 2024 12:21):    Growth in aerobic bottle: Gram Positive Cocci in Clusters    Growth in anaerobic bottle: Gram Positive Cocci in Clusters  Preliminary Report (20 May 2024 20:27):    Growth in aerobic bottle: Staphylococcus epidermidis    Isolation of Coagulase negative Staphylococcus from single blood culture    sets may represent    contamination. Contact the Microbiology Department at [phone] if    susceptibility testing is    clinically indicated.    Growth in anaerobic bottle: Gram Positive Cocci in Clusters    Culture - Blood (collected 18 May 2024 22:29)  Source: .Blood Blood  Gram Stain (19 May 2024 17:12):    Growth in aerobic bottle: Gram Negative Rods and Gram positive cocci in    pairs  Preliminary Report (20 May 2024 10:25):    Growth in aerobic bottle: Proteus mirabilis    Growth in aerobic bottle: Gram positive cocci in pairs    Direct identification is available within approximately 3-5    hours either by Blood Panel Multiplexed PCR or Direct    MALDI-TOF. Details: [email or web address]    Vancomycin resistance gene detected    When multiple species' of Enterococcus are present, association of a    resistance gene to a specific organism cannot be determined.  Organism: Blood Culture PCR (19 May 2024 20:30)  Organism: Blood Culture PCR (19 May 2024 20:30)      CARDIAC MARKERS ( 20 May 2024 10:41 )  x     / x     / 84 U/L / x     / x          RADIOLOGY:            < from: NM Gastric Emptying Liquid (24 @ 13:35) >    Impression:  Normal emptying of radiolabeled meal from the stomach over time with 42%   retention at 1 hour (normal is < 51%).    --- End of Report ---      < end of copied text >  < from: Xray Chest 1 View- PORTABLE-Routine (24 @ 04:21) >  IMPRESSION:    Stable bilateral opacities, right greater than left.    --- End of Report ---    < end of copied text >  < from: CT Chest w/ IV Cont (24 @ 23:32) >  IMPRESSION:     Patulous distended fluid-filled esophagus associated with 7.4 cm hiatal   hernia (at risk for aspiration)..    Bilateral areas of consolidation both lower lobes.  Patchy opacity both lungs, right greater than left, similar to earlier   study. Interval resolution of previous small effusions.    Markedly distended urinary bladder with dependent debris, possible   hemorrhage. No bladder calculi. (/128). Correlate with urinalysis.      --- End of Report ---    < end of copied text >  < from: CT Abdomen and Pelvis w/ IV Cont (24 @ 23:32) >  IMPRESSION:     Patulous distended fluid-filled esophagus associated with 7.4 cm hiatal   hernia (at risk for aspiration)..    Bilateral areas of consolidation both lower lobes.  Patchy opacity both lungs, right greater than left, similar to earlier   study. Interval resolution of previous small effusions.    Markedly distended urinary bladder with dependent debris, possible   hemorrhage. No bladder calculi. (/128). Correlate with urinalysis.      --- End of Report ---    < end of copied text >      ASSESSMENT/PLAN:     58 year old female with PMH of Down Syndrome, cerebral palsy, seizure disorder presents from [facility] for respiratory distress. Admitted to SDU for severe sepsis & acute hypoxemic respiraotry failure leiy 2/2 recurrent CAP.     #Acute Hypoxemic Respiratory Failure  #Severe sepsis with Septic Shock  #Possible Recurrent Aspiration (Despite having PEG)  #Chronic Hypotension  - COVID/RVP/MRSA negative  - c/w midodrine 5 mg TID  - target MAP > 60  - BCx 4/15: S. capitis  BCx 4/15 & : NGTD  - scopolamine for secretions  - aspiration precautions  - CT chest: bilateral opacities  -  & : spiked fever & WBCs  - BCx : NGTD  - BCX : NGTD  - -: overnight tachycardic 140s, hypotensive, febrile, given 1L LR bolus & then 500 cc for elevated lactate  - Spiked fever on   - s/p meropenem 1g q8h x multiple courses  - reduced volume of diet bolus to 250 & meropenem resumed, ID recalled on    - started on IV Daptomycin 450 mg q24h for E. faecium, E. faecalis, MRSE bacteremia   - Baseline & Weekly CK  - TTE pending  - f/u ESR/CRP     #Rash 2/2 Contact Dermatitis likely 2/2 CHG - improving  - hold CHG wash  - c/w topical care & monitoring    #Episodes of Vomiting c/b Aspiration  - recurrent aspiration risk due to hiatal hernia  - on PEG feed, lower volume  - gastric emptying study negative    #B/l Foot Ulcers  - no abscess/cellulitis  - off loading  - wound care    #Seizure Disorder  #Down Syndrome  #Nonverbal   #Bedbound  #PEG tube  - c/w keppra  - CTSx recalled, G-tube unclogged                                           --------------------------------------------------------------    # DVT prophylaxis: Lovenox  # GI prophylaxis: PPI  # Diet:   # Activity:   # Code status: Full Code  # Disposition:    Pendin) TTE  2) ESR/CRP     24H events:    Patient is a 58y old Female who presents with a chief complaint of Pneumonia (20 May 2024 12:21)    Primary diagnosis of Acute sepsis        Today is hospital day 36d. This morning patient was seen and examined at bedside, resting comfortably in bed.    No acute or major events overnight.    Code Status:    Family communication:  Contact date:  Name of person contacted:  Relationship to patient:  Communication details:  What matters most:    PAST MEDICAL & SURGICAL HISTORY  Down syndrome    Osteoporosis    Mild anemia    Neuropathy    S/P debridement  of R hip on 3/2/21      SOCIAL HISTORY:  Social History:      ALLERGIES:  chlorhexidine containing compounds (Rash (Mild to Mod))    MEDICATIONS:  STANDING MEDICATIONS  albuterol/ipratropium for Nebulization 3 milliLiter(s) Nebulizer every 6 hours  AQUAPHOR (petrolatum Ointment) 1 Application(s) Topical two times a day  artificial  tears Solution 1 Drop(s) Both EYES two times a day  calcium carbonate   1250 mG (OsCal) 1 Tablet(s) Oral daily  DAPTOmycin IVPB 450 milliGRAM(s) IV Intermittent every 24 hours  enoxaparin Injectable 30 milliGRAM(s) SubCutaneous every 24 hours  famotidine    Tablet 20 milliGRAM(s) Oral two times a day  gabapentin Solution 300 milliGRAM(s) Oral two times a day  levETIRAcetam  Solution 750 milliGRAM(s) Oral two times a day  meropenem  IVPB 1000 milliGRAM(s) IV Intermittent every 8 hours  midodrine 5 milliGRAM(s) Oral every 8 hours  raloxifene 60 milliGRAM(s) Oral daily  scopolamine 1 mG/72 Hr(s) Patch 1 Patch Transdermal every 72 hours  senna 2 Tablet(s) Oral at bedtime    PRN MEDICATIONS  acetaminophen     Tablet .. 650 milliGRAM(s) Oral every 6 hours PRN  melatonin 3 milliGRAM(s) Oral at bedtime PRN  ondansetron Injectable 4 milliGRAM(s) IV Push every 8 hours PRN    VITALS:   T(F): 98.4  HR: 81  BP: 115/76  RR: 18  SpO2: 96%    PHYSICAL EXAM:  GENERAL:   ( x ) NAD, lying in bed comfortably     (  ) obtunded     (  ) lethargic     (  ) somnolent    HEAD:   ( x ) Atraumatic     (  ) hematoma     (  ) laceration (specify location:       )     NECK:  ( x ) Supple     (  ) neck stiffness     (  ) nuchal rigidity     (  )  no JVD     (  ) JVD present ( -- cm)    HEART:  Rate -->     (  ) normal rate     (  ) bradycardic     (  ) tachycardic  Rhythm -->     ( x ) regular     (  ) regularly irregular     (  ) irregularly irregular  Murmurs -->     (  ) normal s1s2     (  ) systolic murmur     (  ) diastolic murmur     (  ) continuous murmur      (  ) S3 present     (  ) S4 present    LUNGS:   (x  )Unlabored respirations     (  ) tachypnea  ( x ) B/L air entry     (  ) decreased breath sounds in:  (location     )    (  ) no adventitious sound     (  ) crackles     (  ) wheezing      (  ) rhonchi      (specify location:       )  (  ) chest wall tenderness (specify location:       )    ABDOMEN:   ( x ) Soft     (  ) tense   |   (  x) nondistended     (  ) distended   |   (  ) +BS     (  ) hypoactive bowel sounds     (  ) hyperactive bowel sounds  ( x ) nontender     (  ) RUQ tenderness     (  ) RLQ tenderness     (  ) LLQ tenderness     (  ) epigastric tenderness     (  ) diffuse tenderness  (  ) Splenomegaly      (  ) Hepatomegaly      (  ) Jaundice     (  ) ecchymosis     EXTREMITIES:  (  ) Normal     (  ) Rash     (  ) ecchymosis     (  ) varicose veins      (  ) pitting edema     (  ) non-pitting edema   (  ) ulceration     (  ) gangrene:     (location:     )    NERVOUS SYSTEM:    (  ) A&Ox3     (  ) confused     (  ) lethargic  CN II-XII:     (  ) Intact     (  ) deficits found     (Specify:     )   Upper extremities:     (  ) no sensorimotor deficits     (  ) weakness     (  ) loss of proprioception/vibration     (  ) loss of touch/temperature (specify:    )  Lower extremities:     (  ) no sensorimotor deficits     (  ) weakness     (  ) loss of proprioception/vibration     (  ) loss of touch/temperature (specify:    )    SKIN:   (  ) No rashes or lesions     (  ) maculopapular rash     (  ) pustules     (  ) vesicles     (  ) ulcer     (  ) ecchymosis     (specify location:     )    AMPAC score:    (  ) Indwelling Madsen Catheter:   Date insterted:    Reason (  ) Critical illness     (  ) urinary retention    (  ) Accurate Ins/Outs Monitoring     (  ) CMO patient    (  ) Central Line:   Date inserted:  Location: (  ) Right IJ     (  ) Left IJ     (  ) Right Fem     (  ) Left Fem    (  ) SPC        (  ) pigtail       (  ) PEG tube       (  ) colostomy       (  ) jejunostomy  (  ) U-Dall    LABS:                        9.2    7.64  )-----------( 483      ( 20 May 2024 06:55 )             30.6     0520    140  |  100  |  15  ----------------------------<  112<H>  4.7   |  31  |  0.5<L>    Ca    8.9      20 May 2024 06:55    TPro  6.3  /  Alb  2.8<L>  /  TBili  <0.2  /  DBili  x   /  AST  18  /  ALT  15  /  AlkPhos  104  05-20      Urinalysis Basic - ( 20 May 2024 06:55 )    Color: x / Appearance: x / SG: x / pH: x  Gluc: 112 mg/dL / Ketone: x  / Bili: x / Urobili: x   Blood: x / Protein: x / Nitrite: x   Leuk Esterase: x / RBC: x / WBC x   Sq Epi: x / Non Sq Epi: x / Bacteria: x        Creatine Kinase, Serum: 84 U/L (24 @ 10:41)      Culture - Blood (collected 18 May 2024 22:29)  Source: .Blood Blood  Gram Stain (20 May 2024 12:21):    Growth in aerobic bottle: Gram Positive Cocci in Clusters    Growth in anaerobic bottle: Gram Positive Cocci in Clusters  Preliminary Report (20 May 2024 20:27):    Growth in aerobic bottle: Staphylococcus epidermidis    Isolation of Coagulase negative Staphylococcus from single blood culture    sets may represent    contamination. Contact the Microbiology Department at [phone] if    susceptibility testing is    clinically indicated.    Growth in anaerobic bottle: Gram Positive Cocci in Clusters    Culture - Blood (collected 18 May 2024 22:29)  Source: .Blood Blood  Gram Stain (19 May 2024 17:12):    Growth in aerobic bottle: Gram Negative Rods and Gram positive cocci in    pairs  Preliminary Report (20 May 2024 10:25):    Growth in aerobic bottle: Proteus mirabilis    Growth in aerobic bottle: Gram positive cocci in pairs    Direct identification is available within approximately 3-5    hours either by Blood Panel Multiplexed PCR or Direct    MALDI-TOF. Details: [email or web address]    Vancomycin resistance gene detected    When multiple species' of Enterococcus are present, association of a    resistance gene to a specific organism cannot be determined.  Organism: Blood Culture PCR (19 May 2024 20:30)  Organism: Blood Culture PCR (19 May 2024 20:30)      CARDIAC MARKERS ( 20 May 2024 10:41 )  x     / x     / 84 U/L / x     / x          RADIOLOGY:            < from: NM Gastric Emptying Liquid (24 @ 13:35) >    Impression:  Normal emptying of radiolabeled meal from the stomach over time with 42%   retention at 1 hour (normal is < 51%).    --- End of Report ---      < end of copied text >  < from: Xray Chest 1 View- PORTABLE-Routine (24 @ 04:21) >  IMPRESSION:    Stable bilateral opacities, right greater than left.    --- End of Report ---    < end of copied text >  < from: CT Chest w/ IV Cont (24 @ 23:32) >  IMPRESSION:     Patulous distended fluid-filled esophagus associated with 7.4 cm hiatal   hernia (at risk for aspiration)..    Bilateral areas of consolidation both lower lobes.  Patchy opacity both lungs, right greater than left, similar to earlier   study. Interval resolution of previous small effusions.    Markedly distended urinary bladder with dependent debris, possible   hemorrhage. No bladder calculi. (/128). Correlate with urinalysis.      --- End of Report ---    < end of copied text >  < from: CT Abdomen and Pelvis w/ IV Cont (24 @ 23:32) >  IMPRESSION:     Patulous distended fluid-filled esophagus associated with 7.4 cm hiatal   hernia (at risk for aspiration)..    Bilateral areas of consolidation both lower lobes.  Patchy opacity both lungs, right greater than left, similar to earlier   study. Interval resolution of previous small effusions.    Markedly distended urinary bladder with dependent debris, possible   hemorrhage. No bladder calculi. (/128). Correlate with urinalysis.      --- End of Report ---    < end of copied text >      ASSESSMENT/PLAN:     58 year old female with PMH of Down Syndrome, cerebral palsy, seizure disorder presents from [facility] for respiratory distress. Admitted to SDU for severe sepsis & acute hypoxemic respiraotry failure leiy 2/2 recurrent CAP.     #Acute Hypoxemic Respiratory Failure  #Severe sepsis with Septic Shock  #Possible Recurrent Aspiration (Despite having PEG)  #Chronic Hypotension  - COVID/RVP/MRSA negative  - c/w midodrine 5 mg TID  - target MAP > 60  - BCx 4/15: S. capitis  BCx 4/15 & : NGTD  - scopolamine for secretions  - aspiration precautions  - CT chest: bilateral opacities  -  & : spiked fever & WBCs  - BCx : NGTD  - BCX : NGTD  - -: overnight tachycardic 140s, hypotensive, febrile, given 1L LR bolus & then 500 cc for elevated lactate  - Spiked fever on   - s/p meropenem 1g q8h x multiple courses  - reduced volume of diet bolus to 250 & meropenem resumed, ID recalled on    - started on IV Daptomycin 450 mg q24h for E. faecium, E. faecalis, MRSE bacteremia   - Baseline & Weekly CK  - TTE pending  - CRP: 31.9   - ESR: 13    #Rash 2/2 Contact Dermatitis likely 2/2 CHG - improving  - hold CHG wash  - c/w topical care & monitoring    #Episodes of Vomiting c/b Aspiration  - recurrent aspiration risk due to hiatal hernia  - on PEG feed, lower volume  - gastric emptying study negative    #B/l Foot Ulcers  - no abscess/cellulitis  - off loading  - wound care    #Seizure Disorder  #Down Syndrome  #Nonverbal   #Bedbound  #PEG tube  - c/w keppra  - CTSx recalled, G-tube unclogged                                           --------------------------------------------------------------    # DVT prophylaxis: Lovenox  # GI prophylaxis: PPI  # Diet:   # Activity:   # Code status: Full Code  # Disposition:    Pendin) TTE  2) RUQ US  3) BCx  4) UA

## 2024-05-21 NOTE — PROGRESS NOTE ADULT - SUBJECTIVE AND OBJECTIVE BOX
pt seen and examined.     My notes supersede resident's notes in case of discrepancy       ROS: no cp, no sob, no n/v, no fever    Vital Signs Last 24 Hrs  T(C): 37.7 (21 May 2024 12:55), Max: 37.7 (21 May 2024 12:55)  T(F): 99.9 (21 May 2024 12:55), Max: 99.9 (21 May 2024 12:55)  HR: 96 (21 May 2024 12:55) (81 - 107)  BP: 114/81 (21 May 2024 12:55) (97/63 - 115/76)  BP(mean): --  RR: 18 (21 May 2024 12:55) (18 - 18)  SpO2: 95% (21 May 2024 12:55) (92% - 99%)    Parameters below as of 20 May 2024 21:00  Patient On (Oxygen Delivery Method): nasal cannula        physical exam  constitutional NAD, AAOX3, Respiratory  lungs CTA, CVS heart RRR, GI: abdomen Soft NT, ND, BS+, skin: intact  neuro exam no focal deficit     MEDICATIONS  (STANDING):  albuterol/ipratropium for Nebulization 3 milliLiter(s) Nebulizer every 6 hours  AQUAPHOR (petrolatum Ointment) 1 Application(s) Topical two times a day  artificial  tears Solution 1 Drop(s) Both EYES two times a day  calcium carbonate   1250 mG (OsCal) 1 Tablet(s) Oral daily  DAPTOmycin IVPB 450 milliGRAM(s) IV Intermittent every 24 hours  enoxaparin Injectable 30 milliGRAM(s) SubCutaneous every 24 hours  famotidine    Tablet 20 milliGRAM(s) Oral two times a day  gabapentin Solution 300 milliGRAM(s) Oral two times a day  levETIRAcetam  Solution 750 milliGRAM(s) Oral two times a day  meropenem  IVPB 1000 milliGRAM(s) IV Intermittent every 8 hours  midodrine 5 milliGRAM(s) Oral every 8 hours  raloxifene 60 milliGRAM(s) Oral daily  scopolamine 1 mG/72 Hr(s) Patch 1 Patch Transdermal every 72 hours  senna 2 Tablet(s) Oral at bedtime    MEDICATIONS  (PRN):  acetaminophen     Tablet .. 650 milliGRAM(s) Oral every 6 hours PRN Temp greater or equal to 38C (100.4F), Mild Pain (1 - 3)  melatonin 3 milliGRAM(s) Oral at bedtime PRN Insomnia  ondansetron Injectable 4 milliGRAM(s) IV Push every 8 hours PRN Nausea and/or Vomiting                            9.4    9.63  )-----------( 496      ( 21 May 2024 07:06 )             30.8     05-21    143  |  102  |  16  ----------------------------<  104<H>  4.8   |  30  |  0.5<L>    Ca    8.6      21 May 2024 07:06  Mg     2.2     05-21    TPro  6.3  /  Alb  2.8<L>  /  TBili  <0.2  /  DBili  x   /  AST  18  /  ALT  15  /  AlkPhos  104  05-20    Procalcitonin: 0.59 ng/mL [0.02 - 0.10] (05-06-24 @ 10:50)  Procalcitonin: 0.16 ng/mL [0.02 - 0.10] (05-03-24 @ 18:01)  Procalcitonin, Serum: 6.93 ng/mL [0.02 - 0.10] (04-16-24 @ 07:03)        CARDIAC MARKERS ( 20 May 2024 10:41 )  x     / x     / 84 U/L / x     / x        Culture - Blood (05.18.24 @ 22:29)    -  ESBL: Detec   -  Staphylococcus epidermidis, Methicillin resistant: Detec   -  Enterococcus faecium: Detec   -  Enterococcus faecalis: Detec   -  Proteus species: Detec   -  CTX-M Resistance Marker: Detec   -  Levofloxacin: R >4   -  Meropenem: S <=1   -  Piperacillin/Tazobactam: R <=8   -  Tobramycin: S <=2   -  Trimethoprim/Sulfamethoxazole: R >2/38   Gram Stain:   Growth in aerobic bottle: Gram Negative Rods and Gram positive cocci in  pairs   -  Ampicillin: R >16 These ampicillin results predict results for amoxicillin   -  Ampicillin/Sulbactam: R <=4/2   -  Aztreonam: R <=4   -  Cefazolin: R >16   -  Cefepime: R 8   -  Ceftriaxone: R 32   -  Ciprofloxacin: R >2   -  Ertapenem: S <=0.5   -  Gentamicin: S <=2   Specimen Source: .Blood Blood   Organism: Blood Culture PCR   Organism: Proteus mirabilis ESBL   Culture Results:   Growth in aerobic bottle: Proteus mirabilis ESBL  Growth in aerobic bottle: Enterococcus faecalis Susceptibility to follow.  Growth in aerobic bottle: Enterococcus faecium Susceptibility to follow.  Direct identification is available within approximately 3-5  hours either by Blood Panel Multiplexed PCR or Direct  MALDI-TOF. Details: [email or web address]  Vancomycin resistance gene detected  When multiple species' of Enterococcus are present, association of a  resistance gene to a specific organism cannot be determined.   Organism Identification: Blood Culture PCR  Proteus mirabilis ESBL   Method Type: ZANE   Method Type: PCR    A/P    59yo F w/ pmhx of Down's syndrome, cerebral palsy, seizure disorder presents from [facility] for respiratory distress. Admitted to SDU for severe sepsis and acute hypoxemic respiratory failure likely secondary to recurrent CAP.     # Acute hypoxemic respiratory failure   #Severe sepsis with septic shock poa, recurrent fever, new bacteremia, dw ID, added dapto, fu cultures , repeat bc , check lactic acid   check echo, rule out infective endocarditis   #possible recurrent pneumonia /suspected aspiration pna ( despite having peg )   #chronic hypotension   - covid/RSV, MSRA, negative; Bl cx with contaminant   - C/w Midodrine 5mg  - target MAP 60 ( Patient always has BP on the softer side )   - blood cx 4/15: staph capitis  - blood cx 4/15 and 4/17: NGTD  - Scopolamine for secretions. aspiration precautions   - Palliative care following   - 4/22 and 4/23: spiked fever and WBC ;WBC downtrending (10 on 4/26)  - F/u bcx 4/23 -> no growth to date  - CT chest angio w/ contrast 4/24: bilateral opacities   - currently on 3L NC   -  fungitell  >> nEG   -Blood cx >> NGTD 04/28  - 5/5-5/6:  overnight patient was tachy to 140s, hypotensive (around baseline), febrile to 38.5, given 1L LR bolus and then additional 500cc for elevated lactate of 3  - lactate improved overnight, tachy and febrile this am on 5/7/24, given 500cc bolus, CT showing asp pna,  - given 1x danna and vanco dose,    s/p  Meropenem  1g q8  x mutiple courses.   - we reduced volume of diet bolus to 250 cc due to suspected gastric distention /hernia, contributing to aspiration     # rash, probably contact dermatitis, improving   hold chlorhexidine wash, added topical care , monitor     #Episodes of Vomiting, complicated by Aspiration   - recurrent aspiration risk is high for coexisting hiatal hernia   - On PEG feed  , now on lower volume, tolerating   - gastric emptying study, neg     # B/l feet ulcers  - No abscess/cellulitis  - colonized with ESBL strain  - Off loading to prevent pressure sores   - wound care following     #seizure disorder, cont meds   #Down syndrome  #Nonverbal bedbound  # PEG tube   - c/w home meds    #Progress Note Handoff    Pending : followup cultures, repeat bc, echo ( rule out infective endocarditis)   Family discussion: meeting with group home is being arranged by the    Disposition: group home   code status: dnr, dni                         pt seen and examined.     My notes supersede resident's notes in case of discrepancy       ROS: no cp, no sob, no n/v, no fever    Vital Signs Last 24 Hrs  T(C): 37.7 (21 May 2024 12:55), Max: 37.7 (21 May 2024 12:55)  T(F): 99.9 (21 May 2024 12:55), Max: 99.9 (21 May 2024 12:55)  HR: 96 (21 May 2024 12:55) (81 - 107)  BP: 114/81 (21 May 2024 12:55) (97/63 - 115/76)  BP(mean): --  RR: 18 (21 May 2024 12:55) (18 - 18)  SpO2: 95% (21 May 2024 12:55) (92% - 99%)    Parameters below as of 20 May 2024 21:00  Patient On (Oxygen Delivery Method): nasal cannula    physical exam  constitutional NAD, awake and alert , non verbal , Respiratory  lungs bilat crackles , CVS heart RRR, GI: abdomen Soft NT, ND, BS+, skin: rash on the back and abd, and ext, improving   neuro exam contracted, not able to participate in exam    MEDICATIONS  (STANDING):  albuterol/ipratropium for Nebulization 3 milliLiter(s) Nebulizer every 6 hours  AQUAPHOR (petrolatum Ointment) 1 Application(s) Topical two times a day  artificial  tears Solution 1 Drop(s) Both EYES two times a day  calcium carbonate   1250 mG (OsCal) 1 Tablet(s) Oral daily  DAPTOmycin IVPB 450 milliGRAM(s) IV Intermittent every 24 hours  enoxaparin Injectable 30 milliGRAM(s) SubCutaneous every 24 hours  famotidine    Tablet 20 milliGRAM(s) Oral two times a day  gabapentin Solution 300 milliGRAM(s) Oral two times a day  levETIRAcetam  Solution 750 milliGRAM(s) Oral two times a day  meropenem  IVPB 1000 milliGRAM(s) IV Intermittent every 8 hours  midodrine 5 milliGRAM(s) Oral every 8 hours  raloxifene 60 milliGRAM(s) Oral daily  scopolamine 1 mG/72 Hr(s) Patch 1 Patch Transdermal every 72 hours  senna 2 Tablet(s) Oral at bedtime    MEDICATIONS  (PRN):  acetaminophen     Tablet .. 650 milliGRAM(s) Oral every 6 hours PRN Temp greater or equal to 38C (100.4F), Mild Pain (1 - 3)  melatonin 3 milliGRAM(s) Oral at bedtime PRN Insomnia  ondansetron Injectable 4 milliGRAM(s) IV Push every 8 hours PRN Nausea and/or Vomiting                            9.4    9.63  )-----------( 496      ( 21 May 2024 07:06 )             30.8     05-21    143  |  102  |  16  ----------------------------<  104<H>  4.8   |  30  |  0.5<L>    Ca    8.6      21 May 2024 07:06  Mg     2.2     05-21    TPro  6.3  /  Alb  2.8<L>  /  TBili  <0.2  /  DBili  x   /  AST  18  /  ALT  15  /  AlkPhos  104  05-20    Procalcitonin: 0.59 ng/mL [0.02 - 0.10] (05-06-24 @ 10:50)  Procalcitonin: 0.16 ng/mL [0.02 - 0.10] (05-03-24 @ 18:01)  Procalcitonin, Serum: 6.93 ng/mL [0.02 - 0.10] (04-16-24 @ 07:03)        CARDIAC MARKERS ( 20 May 2024 10:41 )  x     / x     / 84 U/L / x     / x        Culture - Blood (05.18.24 @ 22:29)    -  ESBL: Detec   -  Staphylococcus epidermidis, Methicillin resistant: Detec   -  Enterococcus faecium: Detec   -  Enterococcus faecalis: Detec   -  Proteus species: Detec   -  CTX-M Resistance Marker: Detec   -  Levofloxacin: R >4   -  Meropenem: S <=1   -  Piperacillin/Tazobactam: R <=8   -  Tobramycin: S <=2   -  Trimethoprim/Sulfamethoxazole: R >2/38   Gram Stain:   Growth in aerobic bottle: Gram Negative Rods and Gram positive cocci in  pairs   -  Ampicillin: R >16 These ampicillin results predict results for amoxicillin   -  Ampicillin/Sulbactam: R <=4/2   -  Aztreonam: R <=4   -  Cefazolin: R >16   -  Cefepime: R 8   -  Ceftriaxone: R 32   -  Ciprofloxacin: R >2   -  Ertapenem: S <=0.5   -  Gentamicin: S <=2   Specimen Source: .Blood Blood   Organism: Blood Culture PCR   Organism: Proteus mirabilis ESBL   Culture Results:   Growth in aerobic bottle: Proteus mirabilis ESBL  Growth in aerobic bottle: Enterococcus faecalis Susceptibility to follow.  Growth in aerobic bottle: Enterococcus faecium Susceptibility to follow.  Direct identification is available within approximately 3-5  hours either by Blood Panel Multiplexed PCR or Direct  MALDI-TOF. Details: [email or web address]  Vancomycin resistance gene detected  When multiple species' of Enterococcus are present, association of a  resistance gene to a specific organism cannot be determined.   Organism Identification: Blood Culture PCR  Proteus mirabilis ESBL   Method Type: ZANE   Method Type: PCR    A/P    59yo F w/ pmhx of Down's syndrome, cerebral palsy, seizure disorder presents from [facility] for respiratory distress. Admitted to SDU for severe sepsis and acute hypoxemic respiratory failure likely secondary to recurrent CAP.     # Acute hypoxemic respiratory failure   #Severe sepsis with septic shock poa, recurrent fever, new bacteremia, dw ID, added dapto, fu cultures , repeat bc , check lactic acid   check echo, rule out infective endocarditis   #possible recurrent pneumonia /suspected aspiration pna ( despite having peg )   #chronic hypotension   - covid/RSV, MSRA, negative; Bl cx with contaminant   - C/w Midodrine 5mg  - target MAP 60 ( Patient always has BP on the softer side )   - blood cx 4/15: staph capitis  - blood cx 4/15 and 4/17: NGTD  - Scopolamine for secretions. aspiration precautions   - Palliative care following   - 4/22 and 4/23: spiked fever and WBC ;WBC downtrending (10 on 4/26)  - F/u bcx 4/23 -> no growth to date  - CT chest angio w/ contrast 4/24: bilateral opacities   - currently on 3L NC   -  fungitell  >> nEG   -Blood cx >> NGTD 04/28  - 5/5-5/6:  overnight patient was tachy to 140s, hypotensive (around baseline), febrile to 38.5, given 1L LR bolus and then additional 500cc for elevated lactate of 3  - lactate improved overnight, tachy and febrile this am on 5/7/24, given 500cc bolus, CT showing asp pna,  - given 1x danna and vanco dose,    s/p  Meropenem  1g q8  x mutiple courses.   - we reduced volume of diet bolus to 250 cc due to suspected gastric distention /hernia, contributing to aspiration     # rash, probably contact dermatitis, improving   hold chlorhexidine wash, added topical care , monitor     #Episodes of Vomiting, complicated by Aspiration   - recurrent aspiration risk is high for coexisting hiatal hernia   - On PEG feed  , now on lower volume, tolerating   - gastric emptying study, neg     # B/l feet ulcers  - No abscess/cellulitis  - colonized with ESBL strain  - Off loading to prevent pressure sores   - wound care following     #seizure disorder, cont meds   #Down syndrome  #Nonverbal bedbound  # PEG tube   - c/w home meds    #Progress Note Handoff    Pending : followup cultures, repeat bc, echo ( rule out infective endocarditis)   Family discussion: meeting with group home is being arranged by the    Disposition: group home   code status: dnr, dni

## 2024-05-22 LAB
-  AMPICILLIN: SIGNIFICANT CHANGE UP
-  DAPTOMYCIN: SIGNIFICANT CHANGE UP
-  GENTAMICIN SYNERGY: SIGNIFICANT CHANGE UP
-  LINEZOLID: SIGNIFICANT CHANGE UP
-  STREPTOMYCIN SYNERGY: SIGNIFICANT CHANGE UP
-  VANCOMYCIN: SIGNIFICANT CHANGE UP
ANION GAP SERPL CALC-SCNC: 11 MMOL/L — SIGNIFICANT CHANGE UP (ref 7–14)
BASOPHILS # BLD AUTO: 0.06 K/UL — SIGNIFICANT CHANGE UP (ref 0–0.2)
BASOPHILS NFR BLD AUTO: 0.4 % — SIGNIFICANT CHANGE UP (ref 0–1)
BUN SERPL-MCNC: 14 MG/DL — SIGNIFICANT CHANGE UP (ref 10–20)
CALCIUM SERPL-MCNC: 8.7 MG/DL — SIGNIFICANT CHANGE UP (ref 8.4–10.4)
CHLORIDE SERPL-SCNC: 99 MMOL/L — SIGNIFICANT CHANGE UP (ref 98–110)
CO2 SERPL-SCNC: 27 MMOL/L — SIGNIFICANT CHANGE UP (ref 17–32)
CREAT SERPL-MCNC: 0.5 MG/DL — LOW (ref 0.7–1.5)
CULTURE RESULTS: SIGNIFICANT CHANGE UP
EGFR: 109 ML/MIN/1.73M2 — SIGNIFICANT CHANGE UP
EOSINOPHIL # BLD AUTO: 0.34 K/UL — SIGNIFICANT CHANGE UP (ref 0–0.7)
EOSINOPHIL NFR BLD AUTO: 2.5 % — SIGNIFICANT CHANGE UP (ref 0–8)
GLUCOSE BLDC GLUCOMTR-MCNC: 105 MG/DL — HIGH (ref 70–99)
GLUCOSE BLDC GLUCOMTR-MCNC: 112 MG/DL — HIGH (ref 70–99)
GLUCOSE BLDC GLUCOMTR-MCNC: 152 MG/DL — HIGH (ref 70–99)
GLUCOSE SERPL-MCNC: 99 MG/DL — SIGNIFICANT CHANGE UP (ref 70–99)
HCT VFR BLD CALC: 31.4 % — LOW (ref 37–47)
HGB BLD-MCNC: 9.7 G/DL — LOW (ref 12–16)
IMM GRANULOCYTES NFR BLD AUTO: 0.4 % — HIGH (ref 0.1–0.3)
LYMPHOCYTES # BLD AUTO: 1.41 K/UL — SIGNIFICANT CHANGE UP (ref 1.2–3.4)
LYMPHOCYTES # BLD AUTO: 10.5 % — LOW (ref 20.5–51.1)
MCHC RBC-ENTMCNC: 29.6 PG — SIGNIFICANT CHANGE UP (ref 27–31)
MCHC RBC-ENTMCNC: 30.9 G/DL — LOW (ref 32–37)
MCV RBC AUTO: 95.7 FL — SIGNIFICANT CHANGE UP (ref 81–99)
METHOD TYPE: SIGNIFICANT CHANGE UP
MONOCYTES # BLD AUTO: 0.43 K/UL — SIGNIFICANT CHANGE UP (ref 0.1–0.6)
MONOCYTES NFR BLD AUTO: 3.2 % — SIGNIFICANT CHANGE UP (ref 1.7–9.3)
NEUTROPHILS # BLD AUTO: 11.12 K/UL — HIGH (ref 1.4–6.5)
NEUTROPHILS NFR BLD AUTO: 83 % — HIGH (ref 42.2–75.2)
NRBC # BLD: 0 /100 WBCS — SIGNIFICANT CHANGE UP (ref 0–0)
PLATELET # BLD AUTO: 484 K/UL — HIGH (ref 130–400)
PMV BLD: 9.8 FL — SIGNIFICANT CHANGE UP (ref 7.4–10.4)
POTASSIUM SERPL-MCNC: 4.5 MMOL/L — SIGNIFICANT CHANGE UP (ref 3.5–5)
POTASSIUM SERPL-SCNC: 4.5 MMOL/L — SIGNIFICANT CHANGE UP (ref 3.5–5)
RBC # BLD: 3.28 M/UL — LOW (ref 4.2–5.4)
RBC # FLD: 20.8 % — HIGH (ref 11.5–14.5)
SODIUM SERPL-SCNC: 137 MMOL/L — SIGNIFICANT CHANGE UP (ref 135–146)
SPECIMEN SOURCE: SIGNIFICANT CHANGE UP
WBC # BLD: 13.41 K/UL — HIGH (ref 4.8–10.8)
WBC # FLD AUTO: 13.41 K/UL — HIGH (ref 4.8–10.8)

## 2024-05-22 PROCEDURE — 99232 SBSQ HOSP IP/OBS MODERATE 35: CPT

## 2024-05-22 RX ORDER — SODIUM CHLORIDE 9 MG/ML
500 INJECTION, SOLUTION INTRAVENOUS
Refills: 0 | Status: DISCONTINUED | OUTPATIENT
Start: 2024-05-22 | End: 2024-05-26

## 2024-05-22 RX ORDER — MIDODRINE HYDROCHLORIDE 2.5 MG/1
10 TABLET ORAL EVERY 8 HOURS
Refills: 0 | Status: DISCONTINUED | OUTPATIENT
Start: 2024-05-22 | End: 2024-05-29

## 2024-05-22 RX ADMIN — MIDODRINE HYDROCHLORIDE 5 MILLIGRAM(S): 2.5 TABLET ORAL at 05:46

## 2024-05-22 RX ADMIN — FAMOTIDINE 20 MILLIGRAM(S): 10 INJECTION INTRAVENOUS at 18:04

## 2024-05-22 RX ADMIN — Medication 3 MILLILITER(S): at 13:23

## 2024-05-22 RX ADMIN — DAPTOMYCIN 118 MILLIGRAM(S): 500 INJECTION, POWDER, LYOPHILIZED, FOR SOLUTION INTRAVENOUS at 10:32

## 2024-05-22 RX ADMIN — LEVETIRACETAM 750 MILLIGRAM(S): 250 TABLET, FILM COATED ORAL at 05:45

## 2024-05-22 RX ADMIN — SODIUM CHLORIDE 60 MILLILITER(S): 9 INJECTION, SOLUTION INTRAVENOUS at 15:38

## 2024-05-22 RX ADMIN — MIDODRINE HYDROCHLORIDE 5 MILLIGRAM(S): 2.5 TABLET ORAL at 13:09

## 2024-05-22 RX ADMIN — Medication 1 DROP(S): at 18:05

## 2024-05-22 RX ADMIN — Medication 3 MILLILITER(S): at 07:41

## 2024-05-22 RX ADMIN — ENOXAPARIN SODIUM 30 MILLIGRAM(S): 100 INJECTION SUBCUTANEOUS at 13:08

## 2024-05-22 RX ADMIN — GABAPENTIN 300 MILLIGRAM(S): 400 CAPSULE ORAL at 18:04

## 2024-05-22 RX ADMIN — Medication 1 APPLICATION(S): at 06:03

## 2024-05-22 RX ADMIN — LEVETIRACETAM 750 MILLIGRAM(S): 250 TABLET, FILM COATED ORAL at 18:04

## 2024-05-22 RX ADMIN — GABAPENTIN 300 MILLIGRAM(S): 400 CAPSULE ORAL at 05:45

## 2024-05-22 RX ADMIN — Medication 650 MILLIGRAM(S): at 21:56

## 2024-05-22 RX ADMIN — MEROPENEM 100 MILLIGRAM(S): 1 INJECTION INTRAVENOUS at 21:26

## 2024-05-22 RX ADMIN — MEROPENEM 100 MILLIGRAM(S): 1 INJECTION INTRAVENOUS at 05:46

## 2024-05-22 RX ADMIN — MEROPENEM 100 MILLIGRAM(S): 1 INJECTION INTRAVENOUS at 13:13

## 2024-05-22 RX ADMIN — MIDODRINE HYDROCHLORIDE 10 MILLIGRAM(S): 2.5 TABLET ORAL at 21:26

## 2024-05-22 RX ADMIN — FAMOTIDINE 20 MILLIGRAM(S): 10 INJECTION INTRAVENOUS at 05:46

## 2024-05-22 RX ADMIN — Medication 650 MILLIGRAM(S): at 21:26

## 2024-05-22 RX ADMIN — Medication 1 DROP(S): at 06:03

## 2024-05-22 RX ADMIN — Medication 1 APPLICATION(S): at 18:05

## 2024-05-22 RX ADMIN — Medication 1 TABLET(S): at 12:12

## 2024-05-22 RX ADMIN — RALOXIFENE HYDROCHLORIDE 60 MILLIGRAM(S): 60 TABLET, COATED ORAL at 12:12

## 2024-05-22 RX ADMIN — Medication 3 MILLILITER(S): at 20:40

## 2024-05-22 RX ADMIN — SCOPALAMINE 1 PATCH: 1 PATCH, EXTENDED RELEASE TRANSDERMAL at 19:52

## 2024-05-22 NOTE — PROGRESS NOTE ADULT - SUBJECTIVE AND OBJECTIVE BOX
24H events:    Patient is a 58y old Female who presents with a chief complaint of Pneumonia (21 May 2024 13:40)    Primary diagnosis of Acute sepsis        Today is hospital day 37d. This morning patient was seen and examined at bedside, resting comfortably in bed.    No acute or major events overnight.    Code Status:    Family communication:  Contact date:  Name of person contacted:  Relationship to patient:  Communication details:  What matters most:    PAST MEDICAL & SURGICAL HISTORY  Down syndrome    Osteoporosis    Mild anemia    Neuropathy    S/P debridement  of R hip on 3/2/21      SOCIAL HISTORY:  Social History:      ALLERGIES:  chlorhexidine containing compounds (Rash (Mild to Mod))    MEDICATIONS:  STANDING MEDICATIONS  albuterol/ipratropium for Nebulization 3 milliLiter(s) Nebulizer every 6 hours  AQUAPHOR (petrolatum Ointment) 1 Application(s) Topical two times a day  artificial  tears Solution 1 Drop(s) Both EYES two times a day  calcium carbonate   1250 mG (OsCal) 1 Tablet(s) Oral daily  DAPTOmycin IVPB 450 milliGRAM(s) IV Intermittent every 24 hours  enoxaparin Injectable 30 milliGRAM(s) SubCutaneous every 24 hours  famotidine    Tablet 20 milliGRAM(s) Oral two times a day  gabapentin Solution 300 milliGRAM(s) Oral two times a day  levETIRAcetam  Solution 750 milliGRAM(s) Oral two times a day  meropenem  IVPB 1000 milliGRAM(s) IV Intermittent every 8 hours  midodrine 5 milliGRAM(s) Oral every 8 hours  raloxifene 60 milliGRAM(s) Oral daily  scopolamine 1 mG/72 Hr(s) Patch 1 Patch Transdermal every 72 hours  senna 2 Tablet(s) Oral at bedtime    PRN MEDICATIONS  acetaminophen     Tablet .. 650 milliGRAM(s) Oral every 6 hours PRN  melatonin 3 milliGRAM(s) Oral at bedtime PRN  ondansetron Injectable 4 milliGRAM(s) IV Push every 8 hours PRN    VITALS:   T(F): 99.5  HR: 113  BP: 104/68  RR: 18  SpO2: 96%    PHYSICAL EXAM:  GENERAL:   ( x ) NAD, lying in bed comfortably     (  ) obtunded     (  ) lethargic     (  ) somnolent    HEAD:   ( x ) Atraumatic     (  ) hematoma     (  ) laceration (specify location:       )     NECK:  ( x ) Supple     (  ) neck stiffness     (  ) nuchal rigidity     (  )  no JVD     (  ) JVD present ( -- cm)    HEART:  Rate -->     (  ) normal rate     (  ) bradycardic     (  ) tachycardic  Rhythm -->     ( x ) regular     (  ) regularly irregular     (  ) irregularly irregular  Murmurs -->     (  ) normal s1s2     (  ) systolic murmur     (  ) diastolic murmur     (  ) continuous murmur      (  ) S3 present     (  ) S4 present    LUNGS:   (x  )Unlabored respirations     (  ) tachypnea  ( x ) B/L air entry     (  ) decreased breath sounds in:  (location     )    (  ) no adventitious sound     (  ) crackles     (  ) wheezing      (  ) rhonchi      (specify location:       )  (  ) chest wall tenderness (specify location:       )    ABDOMEN:   ( x ) Soft     (  ) tense   |   (  x) nondistended     (  ) distended   |   (  ) +BS     (  ) hypoactive bowel sounds     (  ) hyperactive bowel sounds  ( x ) nontender     (  ) RUQ tenderness     (  ) RLQ tenderness     (  ) LLQ tenderness     (  ) epigastric tenderness     (  ) diffuse tenderness  (  ) Splenomegaly      (  ) Hepatomegaly      (  ) Jaundice     (  ) ecchymosis     EXTREMITIES:  (  ) Normal     (  ) Rash     (  ) ecchymosis     (  ) varicose veins      (  ) pitting edema     (  ) non-pitting edema   (  ) ulceration     (  ) gangrene:     (location:     )    NERVOUS SYSTEM:    (  ) A&Ox3     (  ) confused     (  ) lethargic  CN II-XII:     (  ) Intact     (  ) deficits found     (Specify:     )   Upper extremities:     (  ) no sensorimotor deficits     (  ) weakness     (  ) loss of proprioception/vibration     (  ) loss of touch/temperature (specify:    )  Lower extremities:     (  ) no sensorimotor deficits     (  ) weakness     (  ) loss of proprioception/vibration     (  ) loss of touch/temperature (specify:    )    SKIN:   (  ) No rashes or lesions     (  ) maculopapular rash     (  ) pustules     (  ) vesicles     (  ) ulcer     (  ) ecchymosis     (specify location:     )    AMPAC score:    (  ) Indwelling Madsen Catheter:   Date insterted:    Reason (  ) Critical illness     (  ) urinary retention    (  ) Accurate Ins/Outs Monitoring     (  ) CMO patient    (  ) Central Line:   Date inserted:  Location: (  ) Right IJ     (  ) Left IJ     (  ) Right Fem     (  ) Left Fem    (  ) SPC        (  ) pigtail       (  ) PEG tube       (  ) colostomy       (  ) jejunostomy  (  ) U-Dall    LABS:                        9.7    13.41 )-----------( 484      ( 22 May 2024 06:36 )             31.4         137  |  99  |  14  ----------------------------<  99  4.5   |  27  |  0.5<L>    Ca    8.7      22 May 2024 06:36  Mg     2.2             Urinalysis Basic - ( 22 May 2024 06:36 )    Color: x / Appearance: x / SG: x / pH: x  Gluc: 99 mg/dL / Ketone: x  / Bili: x / Urobili: x   Blood: x / Protein: x / Nitrite: x   Leuk Esterase: x / RBC: x / WBC x   Sq Epi: x / Non Sq Epi: x / Bacteria: x            CARDIAC MARKERS ( 20 May 2024 10:41 )  x     / x     / 84 U/L / x     / x          RADIOLOGY:    < from: US Abdomen Upper Quadrant Right (24 @ 22:49) >  IMPRESSION:    Extremely limited exam due to patient cooperation. Poorly visualized   liver. Cholelithiasis is seen.    --- End of Report ---    < end of copied text >          < from: NM Gastric Emptying Liquid (24 @ 13:35) >    Impression:  Normal emptying of radiolabeled meal from the stomach over time with 42%   retention at 1 hour (normal is < 51%).    --- End of Report ---      < end of copied text >  < from: Xray Chest 1 View- PORTABLE-Routine (24 @ 04:21) >  IMPRESSION:    Stable bilateral opacities, right greater than left.    --- End of Report ---    < end of copied text >  < from: CT Chest w/ IV Cont (24 @ 23:32) >  IMPRESSION:     Patulous distended fluid-filled esophagus associated with 7.4 cm hiatal   hernia (at risk for aspiration)..    Bilateral areas of consolidation both lower lobes.  Patchy opacity both lungs, right greater than left, similar to earlier   study. Interval resolution of previous small effusions.    Markedly distended urinary bladder with dependent debris, possible   hemorrhage. No bladder calculi. (4/128). Correlate with urinalysis.      --- End of Report ---    < end of copied text >  < from: CT Abdomen and Pelvis w/ IV Cont (24 @ 23:32) >  IMPRESSION:     Patulous distended fluid-filled esophagus associated with 7.4 cm hiatal   hernia (at risk for aspiration)..    Bilateral areas of consolidation both lower lobes.  Patchy opacity both lungs, right greater than left, similar to earlier   study. Interval resolution of previous small effusions.    Markedly distended urinary bladder with dependent debris, possible   hemorrhage. No bladder calculi. (). Correlate with urinalysis.      --- End of Report ---    < end of copied text >      ASSESSMENT/PLAN:     58 year old female with PMH of Down Syndrome, cerebral palsy, seizure disorder presents from [facility] for respiratory distress. Admitted to SDU for severe sepsis & acute hypoxemic respiraotry failure [facility] / recurrent CAP.     #Acute Hypoxemic Respiratory Failure  #Severe sepsis with Septic Shock  #Possible Recurrent Aspiration (Despite having PEG)  #Chronic Hypotension  - COVID/RVP/MRSA negative  - c/w midodrine 5 mg TID  - target MAP > 60  - BCx 4/15: S. capitis  BCx 4/15 & : NGTD  - scopolamine for secretions  - aspiration precautions  - CT chest: bilateral opacities  -  & : spiked fever & WBCs  - BCx : NGTD  - BCX : NGTD  - -: overnight tachycardic 140s, hypotensive, febrile, given 1L LR bolus & then 500 cc for elevated lactate  - Spiked fever on   - s/p meropenem 1g q8h x multiple courses  - reduced volume of diet bolus to 250 & meropenem resumed, ID recalled on    - started on IV Daptomycin 450 mg q24h for E. faecium, E. faecalis, MRSE bacteremia   - Baseline & Weekly CK  - TTE pending  - CRP: 31.9   - ESR: 13    #Rash 2/2 Contact Dermatitis likely 2/2 CHG - improving  - hold CHG wash  - c/w topical care & monitoring    #Episodes of Vomiting c/b Aspiration  - recurrent aspiration risk due to hiatal hernia  - on PEG feed, lower volume  - gastric emptying study negative    #B/l Foot Ulcers  - no abscess/cellulitis  - off loading  - wound care    #Seizure Disorder  #Down Syndrome  #Nonverbal   #Bedbound  #PEG tube  - c/w keppra  - CTSx recalled, G-tube unclogged                                           --------------------------------------------------------------    # DVT prophylaxis: Lovenox  # GI prophylaxis: PPI  # Diet:   # Activity:   # Code status: Full Code  # Disposition:    Pendin) TTE  2) BCx  3) UA       24H events:    Patient is a 58y old Female who presents with a chief complaint of Pneumonia (21 May 2024 13:40)    Primary diagnosis of Acute sepsis        Today is hospital day 37d. This morning patient was seen and examined at bedside, resting comfortably in bed.    No acute or major events overnight.    Code Status:    Family communication:  Contact date:  Name of person contacted:  Relationship to patient:  Communication details:  What matters most:    PAST MEDICAL & SURGICAL HISTORY  Down syndrome    Osteoporosis    Mild anemia    Neuropathy    S/P debridement  of R hip on 3/2/21      SOCIAL HISTORY:  Social History:      ALLERGIES:  chlorhexidine containing compounds (Rash (Mild to Mod))    MEDICATIONS:  STANDING MEDICATIONS  albuterol/ipratropium for Nebulization 3 milliLiter(s) Nebulizer every 6 hours  AQUAPHOR (petrolatum Ointment) 1 Application(s) Topical two times a day  artificial  tears Solution 1 Drop(s) Both EYES two times a day  calcium carbonate   1250 mG (OsCal) 1 Tablet(s) Oral daily  DAPTOmycin IVPB 450 milliGRAM(s) IV Intermittent every 24 hours  enoxaparin Injectable 30 milliGRAM(s) SubCutaneous every 24 hours  famotidine    Tablet 20 milliGRAM(s) Oral two times a day  gabapentin Solution 300 milliGRAM(s) Oral two times a day  levETIRAcetam  Solution 750 milliGRAM(s) Oral two times a day  meropenem  IVPB 1000 milliGRAM(s) IV Intermittent every 8 hours  midodrine 5 milliGRAM(s) Oral every 8 hours  raloxifene 60 milliGRAM(s) Oral daily  scopolamine 1 mG/72 Hr(s) Patch 1 Patch Transdermal every 72 hours  senna 2 Tablet(s) Oral at bedtime    PRN MEDICATIONS  acetaminophen     Tablet .. 650 milliGRAM(s) Oral every 6 hours PRN  melatonin 3 milliGRAM(s) Oral at bedtime PRN  ondansetron Injectable 4 milliGRAM(s) IV Push every 8 hours PRN    VITALS:   T(F): 99.5  HR: 113  BP: 104/68  RR: 18  SpO2: 96%    PHYSICAL EXAM:  GENERAL:   ( x ) NAD, lying in bed comfortably     (  ) obtunded     (  ) lethargic     (  ) somnolent    HEAD:   ( x ) Atraumatic     (  ) hematoma     (  ) laceration (specify location:       )     NECK:  ( x ) Supple     (  ) neck stiffness     (  ) nuchal rigidity     (  )  no JVD     (  ) JVD present ( -- cm)    HEART:  Rate -->     (  ) normal rate     (  ) bradycardic     (  ) tachycardic  Rhythm -->     ( x ) regular     (  ) regularly irregular     (  ) irregularly irregular  Murmurs -->     (  ) normal s1s2     (  ) systolic murmur     (  ) diastolic murmur     (  ) continuous murmur      (  ) S3 present     (  ) S4 present    LUNGS:   (x  )Unlabored respirations     (  ) tachypnea  ( x ) B/L air entry     (  ) decreased breath sounds in:  (location     )    (  ) no adventitious sound     (  ) crackles     (  ) wheezing      (  ) rhonchi      (specify location:       )  (  ) chest wall tenderness (specify location:       )    ABDOMEN:   ( x ) Soft     (  ) tense   |   (  x) nondistended     (  ) distended   |   (  ) +BS     (  ) hypoactive bowel sounds     (  ) hyperactive bowel sounds  ( x ) nontender     (  ) RUQ tenderness     (  ) RLQ tenderness     (  ) LLQ tenderness     (  ) epigastric tenderness     (  ) diffuse tenderness  (  ) Splenomegaly      (  ) Hepatomegaly      (  ) Jaundice     (  ) ecchymosis     EXTREMITIES:  (  ) Normal     (  ) Rash     (  ) ecchymosis     (  ) varicose veins      (  ) pitting edema     (  ) non-pitting edema   (  ) ulceration     (  ) gangrene:     (location:     )    NERVOUS SYSTEM:    (  ) A&Ox3     (  ) confused     (  ) lethargic  CN II-XII:     (  ) Intact     (  ) deficits found     (Specify:     )   Upper extremities:     (  ) no sensorimotor deficits     (  ) weakness     (  ) loss of proprioception/vibration     (  ) loss of touch/temperature (specify:    )  Lower extremities:     (  ) no sensorimotor deficits     (  ) weakness     (  ) loss of proprioception/vibration     (  ) loss of touch/temperature (specify:    )    SKIN:   (  ) No rashes or lesions     (  ) maculopapular rash     (  ) pustules     (  ) vesicles     (  ) ulcer     (  ) ecchymosis     (specify location:     )    AMPAC score:    (  ) Indwelling Madsen Catheter:   Date insterted:    Reason (  ) Critical illness     (  ) urinary retention    (  ) Accurate Ins/Outs Monitoring     (  ) CMO patient    (  ) Central Line:   Date inserted:  Location: (  ) Right IJ     (  ) Left IJ     (  ) Right Fem     (  ) Left Fem    (  ) SPC        (  ) pigtail       (  ) PEG tube       (  ) colostomy       (  ) jejunostomy  (  ) U-Dall    LABS:                        9.7    13.41 )-----------( 484      ( 22 May 2024 06:36 )             31.4         137  |  99  |  14  ----------------------------<  99  4.5   |  27  |  0.5<L>    Ca    8.7      22 May 2024 06:36  Mg     2.2             Urinalysis Basic - ( 22 May 2024 06:36 )    Color: x / Appearance: x / SG: x / pH: x  Gluc: 99 mg/dL / Ketone: x  / Bili: x / Urobili: x   Blood: x / Protein: x / Nitrite: x   Leuk Esterase: x / RBC: x / WBC x   Sq Epi: x / Non Sq Epi: x / Bacteria: x            CARDIAC MARKERS ( 20 May 2024 10:41 )  x     / x     / 84 U/L / x     / x          RADIOLOGY:    < from: US Abdomen Upper Quadrant Right (24 @ 22:49) >  IMPRESSION:    Extremely limited exam due to patient cooperation. Poorly visualized   liver. Cholelithiasis is seen.    --- End of Report ---    < end of copied text >          < from: NM Gastric Emptying Liquid (24 @ 13:35) >    Impression:  Normal emptying of radiolabeled meal from the stomach over time with 42%   retention at 1 hour (normal is < 51%).    --- End of Report ---      < end of copied text >  < from: Xray Chest 1 View- PORTABLE-Routine (24 @ 04:21) >  IMPRESSION:    Stable bilateral opacities, right greater than left.    --- End of Report ---    < end of copied text >  < from: CT Chest w/ IV Cont (24 @ 23:32) >  IMPRESSION:     Patulous distended fluid-filled esophagus associated with 7.4 cm hiatal   hernia (at risk for aspiration)..    Bilateral areas of consolidation both lower lobes.  Patchy opacity both lungs, right greater than left, similar to earlier   study. Interval resolution of previous small effusions.    Markedly distended urinary bladder with dependent debris, possible   hemorrhage. No bladder calculi. (4/128). Correlate with urinalysis.      --- End of Report ---    < end of copied text >  < from: CT Abdomen and Pelvis w/ IV Cont (24 @ 23:32) >  IMPRESSION:     Patulous distended fluid-filled esophagus associated with 7.4 cm hiatal   hernia (at risk for aspiration)..    Bilateral areas of consolidation both lower lobes.  Patchy opacity both lungs, right greater than left, similar to earlier   study. Interval resolution of previous small effusions.    Markedly distended urinary bladder with dependent debris, possible   hemorrhage. No bladder calculi. (). Correlate with urinalysis.      --- End of Report ---    < end of copied text >      ASSESSMENT/PLAN:     58 year old female with PMH of Down Syndrome, cerebral palsy, seizure disorder presents from [facility] for respiratory distress. Admitted to SDU for severe sepsis & acute hypoxemic respiraotry failure [facility] 2/ recurrent CAP.     #Acute Hypoxemic Respiratory Failure  #Severe sepsis with Septic Shock  #Possible Recurrent Aspiration (Despite having PEG)  - COVID/RVP/MRSA negative  - BCx 4/15: S. capitis  BCx 4/15 & : NGTD  - scopolamine for secretions  - aspiration precautions  - CT chest: bilateral opacities  -  & : spiked fever & WBCs  - BCx : NGTD  - BCX : NGTD  - -: overnight tachycardic 140s, hypotensive, febrile, given 1L LR bolus & then 500 cc for elevated lactate  - Spiked fever on   - s/p meropenem 1g q8h x multiple courses  - reduced volume of diet bolus to 250 & meropenem resumed, ID recalled on    - started on IV Daptomycin 450 mg q24h for E. faecium, E. faecalis, MRSE bacteremia   - Baseline & Weekly CK  - TTE pending  - CRP: 31.9   - ESR: 13  - RUQ US unremarkable    #Chronic Hypotension  - increased midodrine to 10 mg TID   - target MAP > 60    #Rash 2/2 Contact Dermatitis likely 2/2 CHG - improving  - hold CHG wash  - c/w topical care & monitoring    #Episodes of Vomiting c/b Aspiration  - recurrent aspiration risk due to hiatal hernia  - on PEG feed, lower volume  - gastric emptying study negative    #B/l Foot Ulcers  - no abscess/cellulitis  - off loading  - wound care    #Seizure Disorder  #Down Syndrome  #Nonverbal   #Bedbound  #PEG tube  - c/w keppra  - CTSx recalled, G-tube unclogged                                           --------------------------------------------------------------    # DVT prophylaxis: Lovenox  # GI prophylaxis: PPI  # Diet:   # Activity:   # Code status: Full Code  # Disposition:    Pendin) TTE  2) BCx  3) UA       24H events:    Patient is a 58y old Female who presents with a chief complaint of Pneumonia (21 May 2024 13:40)    Primary diagnosis of Acute sepsis        Today is hospital day 37d. This morning patient was seen and examined at bedside, resting comfortably in bed.    No acute or major events overnight.    Code Status:    Family communication:  Contact date:  Name of person contacted:  Relationship to patient:  Communication details:  What matters most:    PAST MEDICAL & SURGICAL HISTORY  Down syndrome    Osteoporosis    Mild anemia    Neuropathy    S/P debridement  of R hip on 3/2/21      SOCIAL HISTORY:  Social History:      ALLERGIES:  chlorhexidine containing compounds (Rash (Mild to Mod))    MEDICATIONS:  STANDING MEDICATIONS  albuterol/ipratropium for Nebulization 3 milliLiter(s) Nebulizer every 6 hours  AQUAPHOR (petrolatum Ointment) 1 Application(s) Topical two times a day  artificial  tears Solution 1 Drop(s) Both EYES two times a day  calcium carbonate   1250 mG (OsCal) 1 Tablet(s) Oral daily  DAPTOmycin IVPB 450 milliGRAM(s) IV Intermittent every 24 hours  enoxaparin Injectable 30 milliGRAM(s) SubCutaneous every 24 hours  famotidine    Tablet 20 milliGRAM(s) Oral two times a day  gabapentin Solution 300 milliGRAM(s) Oral two times a day  levETIRAcetam  Solution 750 milliGRAM(s) Oral two times a day  meropenem  IVPB 1000 milliGRAM(s) IV Intermittent every 8 hours  midodrine 5 milliGRAM(s) Oral every 8 hours  raloxifene 60 milliGRAM(s) Oral daily  scopolamine 1 mG/72 Hr(s) Patch 1 Patch Transdermal every 72 hours  senna 2 Tablet(s) Oral at bedtime    PRN MEDICATIONS  acetaminophen     Tablet .. 650 milliGRAM(s) Oral every 6 hours PRN  melatonin 3 milliGRAM(s) Oral at bedtime PRN  ondansetron Injectable 4 milliGRAM(s) IV Push every 8 hours PRN    VITALS:   T(F): 99.5  HR: 113  BP: 104/68  RR: 18  SpO2: 96%    PHYSICAL EXAM:  GENERAL:   ( x ) NAD, lying in bed comfortably     (  ) obtunded     (  ) lethargic     (  ) somnolent    HEAD:   ( x ) Atraumatic     (  ) hematoma     (  ) laceration (specify location:       )     NECK:  ( x ) Supple     (  ) neck stiffness     (  ) nuchal rigidity     (  )  no JVD     (  ) JVD present ( -- cm)    HEART:  Rate -->     (  ) normal rate     (  ) bradycardic     (  ) tachycardic  Rhythm -->     ( x ) regular     (  ) regularly irregular     (  ) irregularly irregular  Murmurs -->     (  ) normal s1s2     (  ) systolic murmur     (  ) diastolic murmur     (  ) continuous murmur      (  ) S3 present     (  ) S4 present    LUNGS:   (x  )Unlabored respirations     (  ) tachypnea  ( x ) B/L air entry     (  ) decreased breath sounds in:  (location     )    (  ) no adventitious sound     (  ) crackles     (  ) wheezing      (  ) rhonchi      (specify location:       )  (  ) chest wall tenderness (specify location:       )    ABDOMEN:   ( x ) Soft     (  ) tense   |   (  x) nondistended     (  ) distended   |   (  ) +BS     (  ) hypoactive bowel sounds     (  ) hyperactive bowel sounds  ( x ) nontender     (  ) RUQ tenderness     (  ) RLQ tenderness     (  ) LLQ tenderness     (  ) epigastric tenderness     (  ) diffuse tenderness  (  ) Splenomegaly      (  ) Hepatomegaly      (  ) Jaundice     (  ) ecchymosis     EXTREMITIES:  (  ) Normal     (  ) Rash     (  ) ecchymosis     (  ) varicose veins      (  ) pitting edema     (  ) non-pitting edema   (  ) ulceration     (  ) gangrene:     (location:     )    NERVOUS SYSTEM:    (  ) A&Ox3     (  ) confused     (  ) lethargic  CN II-XII:     (  ) Intact     (  ) deficits found     (Specify:     )   Upper extremities:     (  ) no sensorimotor deficits     (  ) weakness     (  ) loss of proprioception/vibration     (  ) loss of touch/temperature (specify:    )  Lower extremities:     (  ) no sensorimotor deficits     (  ) weakness     (  ) loss of proprioception/vibration     (  ) loss of touch/temperature (specify:    )    SKIN:   (  ) No rashes or lesions     (  ) maculopapular rash     (  ) pustules     (  ) vesicles     (  ) ulcer     (  ) ecchymosis     (specify location:     )    AMPAC score:    (  ) Indwelling Madsen Catheter:   Date insterted:    Reason (  ) Critical illness     (  ) urinary retention    (  ) Accurate Ins/Outs Monitoring     (  ) CMO patient    (  ) Central Line:   Date inserted:  Location: (  ) Right IJ     (  ) Left IJ     (  ) Right Fem     (  ) Left Fem    (  ) SPC        (  ) pigtail       (  ) PEG tube       (  ) colostomy       (  ) jejunostomy  (  ) U-Dall    LABS:                        9.7    13.41 )-----------( 484      ( 22 May 2024 06:36 )             31.4         137  |  99  |  14  ----------------------------<  99  4.5   |  27  |  0.5<L>    Ca    8.7      22 May 2024 06:36  Mg     2.2             Urinalysis Basic - ( 22 May 2024 06:36 )    Color: x / Appearance: x / SG: x / pH: x  Gluc: 99 mg/dL / Ketone: x  / Bili: x / Urobili: x   Blood: x / Protein: x / Nitrite: x   Leuk Esterase: x / RBC: x / WBC x   Sq Epi: x / Non Sq Epi: x / Bacteria: x            CARDIAC MARKERS ( 20 May 2024 10:41 )  x     / x     / 84 U/L / x     / x          RADIOLOGY:    < from: US Abdomen Upper Quadrant Right (24 @ 22:49) >  IMPRESSION:    Extremely limited exam due to patient cooperation. Poorly visualized   liver. Cholelithiasis is seen.    --- End of Report ---    < end of copied text >          < from: NM Gastric Emptying Liquid (24 @ 13:35) >    Impression:  Normal emptying of radiolabeled meal from the stomach over time with 42%   retention at 1 hour (normal is < 51%).    --- End of Report ---      < end of copied text >  < from: Xray Chest 1 View- PORTABLE-Routine (24 @ 04:21) >  IMPRESSION:    Stable bilateral opacities, right greater than left.    --- End of Report ---    < end of copied text >  < from: CT Chest w/ IV Cont (24 @ 23:32) >  IMPRESSION:     Patulous distended fluid-filled esophagus associated with 7.4 cm hiatal   hernia (at risk for aspiration)..    Bilateral areas of consolidation both lower lobes.  Patchy opacity both lungs, right greater than left, similar to earlier   study. Interval resolution of previous small effusions.    Markedly distended urinary bladder with dependent debris, possible   hemorrhage. No bladder calculi. (4/128). Correlate with urinalysis.      --- End of Report ---    < end of copied text >  < from: CT Abdomen and Pelvis w/ IV Cont (24 @ 23:32) >  IMPRESSION:     Patulous distended fluid-filled esophagus associated with 7.4 cm hiatal   hernia (at risk for aspiration)..    Bilateral areas of consolidation both lower lobes.  Patchy opacity both lungs, right greater than left, similar to earlier   study. Interval resolution of previous small effusions.    Markedly distended urinary bladder with dependent debris, possible   hemorrhage. No bladder calculi. (). Correlate with urinalysis.      --- End of Report ---    < end of copied text >      ASSESSMENT/PLAN:     58 year old female with PMH of Down Syndrome, cerebral palsy, seizure disorder presents from [facility] for respiratory distress. Admitted to SDU for severe sepsis & acute hypoxemic respiraotry failure [facility] 2/ recurrent CAP.     #Acute Hypoxemic Respiratory Failure  #Severe sepsis with Septic Shock  #Possible Recurrent Aspiration (Despite having PEG)  - COVID/RVP/MRSA negative  - BCx 4/15: S. capitis  BCx 4/15 & : NGTD  - scopolamine for secretions  - aspiration precautions  - CT chest: bilateral opacities  -  & : spiked fever & WBCs  - BCx : NGTD  - BCX : NGTD  - -: overnight tachycardic 140s, hypotensive, febrile, given 1L LR bolus & then 500 cc for elevated lactate  - Spiked fever on   - s/p meropenem 1g q8h x multiple courses  - reduced volume of diet bolus to 250 & meropenem resumed, ID recalled on    - started on IV Daptomycin 450 mg q24h for E. faecium, E. faecalis, MRSE bacteremia   - Baseline & Weekly CK  - TTE pending  - CRP: 31.9   - ESR: 13  - RUQ US unremarkable    #Chronic Hypotension  - increased midodrine to 10 mg TID   - target MAP > 60  - started LR @ 60 cc/hr    #Rash 2/2 Contact Dermatitis likely 2/2 CHG - improving  - hold CHG wash  - c/w topical care & monitoring    #Episodes of Vomiting c/b Aspiration  - recurrent aspiration risk due to hiatal hernia  - on PEG feed, lower volume  - gastric emptying study negative    #B/l Foot Ulcers  - no abscess/cellulitis  - off loading  - wound care    #Seizure Disorder  #Down Syndrome  #Nonverbal   #Bedbound  #PEG tube  - c/w keppra  - CTSx recalled, G-tube unclogged                                           --------------------------------------------------------------    # DVT prophylaxis: Lovenox  # GI prophylaxis: PPI  # Diet:   # Activity:   # Code status: Full Code  # Disposition:    Pendin) TTE  2) BCx  3) UA

## 2024-05-22 NOTE — PROGRESS NOTE ADULT - SUBJECTIVE AND OBJECTIVE BOX
TEA ECHAVARRIA  58y  Female      Patient is a 58y old  Female who presents with a chief complaint of Pneumonia .      INTERVAL HPI/OVERNIGHT EVENTS:      ******************************* REVIEW OF SYSTEMS:**********************************************    All other review of systems negative    *********************** VITALS ******************************************    T(F): 97.5 (05-22-24 @ 15:07)  HR: 81 (05-22-24 @ 15:07) (81 - 113)  BP: 94/54 (05-22-24 @ 15:07) (74/46 - 104/68)  RR: 18 (05-22-24 @ 05:00) (17 - 18)  SpO2: 91% (05-22-24 @ 12:47) (91% - 96%)    05-22-24 @ 07:01  -  05-22-24 @ 17:44  --------------------------------------------------------  IN: 0 mL / OUT: 1 mL / NET: -1 mL            05-22-24 @ 07:01  -  05-22-24 @ 17:44  --------------------------------------------------------  IN: 0 mL / OUT: 1 mL / NET: -1 mL        ******************************** PHYSICAL EXAM:**************************************************  GENERAL: NAD    PSYCH: no agitation,   HEENT:     NERVOUS SYSTEM:  Alert & awake x 1-2    PULMONARY: MARIE, CTA    CARDIOVASCULAR: S1S2 RRR    GI: Soft, NT, ND; BS present.    EXTREMITIES:  2+ Peripheral Pulses, No clubbing, cyanosis,    LYMPH: No lymphadenopathy noted    SKIN: No rashes or lesions      **************************** LABS *******************************************************                          9.7    13.41 )-----------( 484      ( 22 May 2024 06:36 )             31.4     05-22    137  |  99  |  14  ----------------------------<  99  4.5   |  27  |  0.5<L>    Ca    8.7      22 May 2024 06:36  Mg     2.2     05-21        Urinalysis Basic - ( 22 May 2024 06:36 )    Color: x / Appearance: x / SG: x / pH: x  Gluc: 99 mg/dL / Ketone: x  / Bili: x / Urobili: x   Blood: x / Protein: x / Nitrite: x   Leuk Esterase: x / RBC: x / WBC x   Sq Epi: x / Non Sq Epi: x / Bacteria: x        Lactate Trend        CAPILLARY BLOOD GLUCOSE      POCT Blood Glucose.: 105 mg/dL (22 May 2024 11:32)          **************************Active Medications *******************************************  chlorhexidine containing compounds (Rash (Mild to Mod))      acetaminophen     Tablet .. 650 milliGRAM(s) Oral every 6 hours PRN  albuterol/ipratropium for Nebulization 3 milliLiter(s) Nebulizer every 6 hours  AQUAPHOR (petrolatum Ointment) 1 Application(s) Topical two times a day  artificial  tears Solution 1 Drop(s) Both EYES two times a day  calcium carbonate   1250 mG (OsCal) 1 Tablet(s) Oral daily  DAPTOmycin IVPB 450 milliGRAM(s) IV Intermittent every 24 hours  enoxaparin Injectable 30 milliGRAM(s) SubCutaneous every 24 hours  famotidine    Tablet 20 milliGRAM(s) Oral two times a day  gabapentin Solution 300 milliGRAM(s) Oral two times a day  lactated ringers. 500 milliLiter(s) IV Continuous <Continuous>  levETIRAcetam  Solution 750 milliGRAM(s) Oral two times a day  melatonin 3 milliGRAM(s) Oral at bedtime PRN  meropenem  IVPB 1000 milliGRAM(s) IV Intermittent every 8 hours  midodrine 10 milliGRAM(s) Oral every 8 hours  ondansetron Injectable 4 milliGRAM(s) IV Push every 8 hours PRN  raloxifene 60 milliGRAM(s) Oral daily  scopolamine 1 mG/72 Hr(s) Patch 1 Patch Transdermal every 72 hours  senna 2 Tablet(s) Oral at bedtime      ***************************************************  RADIOLOGY & ADDITIONAL TESTS:    Imaging Personally Reviewed:  [ ] YES  [ ] NO    HEALTH ISSUES - PROBLEM Dx:  Septic shock    Acute respiratory failure with hypoxia    Advanced care planning/counseling discussion    Encounter for palliative care

## 2024-05-22 NOTE — PROGRESS NOTE ADULT - ASSESSMENT
57yo F w/ pmhx of Down's syndrome, cerebral palsy, seizure disorder presents from [facility] for respiratory distress. Admitted to SDU for severe sepsis and acute hypoxemic respiratory failure likely secondary to recurrent CAP.     # Acute hypoxemic respiratory failure   #Severe sepsis with septic shock poa,    recurrent fever   new bacteremia, dw ID, added dapto, fu cultures , repeat bc , check lactic acid   check echo, rule out infective endocarditis   #possible recurrent pneumonia /suspected aspiration pna ( despite having peg )   #chronic hypotension   - covid/RSV, MSRA, negative; Bl cx with contaminant   - C/w Midodrine   - target MAP 60 ( Patient always has BP on the softer side )   - blood cx 4/15: staph capitis  - blood cx 4/15 and 4/17: NGTD  - Scopolamine for secretions. aspiration precautions   - Palliative care following   - F/u bcx 4/23 -> no growth to date  - CT chest angio w/ contrast 4/24: bilateral opacities   - currently on 3L NC   -  fungitell  >> nEG   -Blood cx >> NGTD 04/28  - 5/5-5/6:  overnight patient was tachy to 140s, hypotensive (around baseline), febrile to 38.5, given 1L LR bolus and then additional 500cc for elevated lactate of 3  - lactate improved overnight, tachy and febrile this am on 5/7/24, given 500cc bolus, CT showing asp pna,  - given 1x danna and vanco dose,    s/p  Meropenem  1g q8  x mutiple courses.   - we reduced volume of diet bolus to 250 cc due to suspected gastric distention /hernia, contributing to aspiration     # rash, probably contact dermatitis, improving   hold chlorhexidine wash, added topical care , monitor     #Episodes of Vomiting, complicated by Aspiration   - recurrent aspiration risk is high for coexisting hiatal hernia   - On PEG feed  , now on lower volume, tolerating   - gastric emptying study, neg     # B/l feet ulcers  - No abscess/cellulitis  - colonized with ESBL strain  - Off loading to prevent pressure sores   - wound care following   #seizure disorder, cont meds   #Down syndrome  #Nonverbal bedbound  # PEG tube   - c/w home meds    #Progress Note Handoff    Pending : followup cultures, repeat bc, echo ( rule out infective endocarditis)   Family discussion: meeting with group home is being arranged by the    Disposition: group home   code status: DNR, DNI

## 2024-05-23 LAB
-  AMPICILLIN: SIGNIFICANT CHANGE UP
-  DAPTOMYCIN: SIGNIFICANT CHANGE UP
-  GENTAMICIN SYNERGY: SIGNIFICANT CHANGE UP
-  LINEZOLID: SIGNIFICANT CHANGE UP
-  STREPTOMYCIN SYNERGY: SIGNIFICANT CHANGE UP
-  VANCOMYCIN: SIGNIFICANT CHANGE UP
ANION GAP SERPL CALC-SCNC: 9 MMOL/L — SIGNIFICANT CHANGE UP (ref 7–14)
BASOPHILS # BLD AUTO: 0.07 K/UL — SIGNIFICANT CHANGE UP (ref 0–0.2)
BASOPHILS NFR BLD AUTO: 0.9 % — SIGNIFICANT CHANGE UP (ref 0–1)
BUN SERPL-MCNC: 14 MG/DL — SIGNIFICANT CHANGE UP (ref 10–20)
CALCIUM SERPL-MCNC: 8.7 MG/DL — SIGNIFICANT CHANGE UP (ref 8.4–10.4)
CHLORIDE SERPL-SCNC: 99 MMOL/L — SIGNIFICANT CHANGE UP (ref 98–110)
CO2 SERPL-SCNC: 28 MMOL/L — SIGNIFICANT CHANGE UP (ref 17–32)
CREAT SERPL-MCNC: <0.5 MG/DL — LOW (ref 0.7–1.5)
CULTURE RESULTS: ABNORMAL
EGFR: 115 ML/MIN/1.73M2 — SIGNIFICANT CHANGE UP
EOSINOPHIL # BLD AUTO: 0.24 K/UL — SIGNIFICANT CHANGE UP (ref 0–0.7)
EOSINOPHIL NFR BLD AUTO: 3 % — SIGNIFICANT CHANGE UP (ref 0–8)
GLUCOSE BLDC GLUCOMTR-MCNC: 117 MG/DL — HIGH (ref 70–99)
GLUCOSE SERPL-MCNC: 109 MG/DL — HIGH (ref 70–99)
HCT VFR BLD CALC: 28.9 % — LOW (ref 37–47)
HGB BLD-MCNC: 9.1 G/DL — LOW (ref 12–16)
IMM GRANULOCYTES NFR BLD AUTO: 0.4 % — HIGH (ref 0.1–0.3)
LYMPHOCYTES # BLD AUTO: 1.39 K/UL — SIGNIFICANT CHANGE UP (ref 1.2–3.4)
LYMPHOCYTES # BLD AUTO: 17.4 % — LOW (ref 20.5–51.1)
MAGNESIUM SERPL-MCNC: 2.2 MG/DL — SIGNIFICANT CHANGE UP (ref 1.8–2.4)
MCHC RBC-ENTMCNC: 29.8 PG — SIGNIFICANT CHANGE UP (ref 27–31)
MCHC RBC-ENTMCNC: 31.5 G/DL — LOW (ref 32–37)
MCV RBC AUTO: 94.8 FL — SIGNIFICANT CHANGE UP (ref 81–99)
METHOD TYPE: SIGNIFICANT CHANGE UP
MONOCYTES # BLD AUTO: 0.32 K/UL — SIGNIFICANT CHANGE UP (ref 0.1–0.6)
MONOCYTES NFR BLD AUTO: 4 % — SIGNIFICANT CHANGE UP (ref 1.7–9.3)
NEUTROPHILS # BLD AUTO: 5.93 K/UL — SIGNIFICANT CHANGE UP (ref 1.4–6.5)
NEUTROPHILS NFR BLD AUTO: 74.3 % — SIGNIFICANT CHANGE UP (ref 42.2–75.2)
NRBC # BLD: 0 /100 WBCS — SIGNIFICANT CHANGE UP (ref 0–0)
ORGANISM # SPEC MICROSCOPIC CNT: ABNORMAL
ORGANISM # SPEC MICROSCOPIC CNT: SIGNIFICANT CHANGE UP
PLATELET # BLD AUTO: 455 K/UL — HIGH (ref 130–400)
PMV BLD: 9.8 FL — SIGNIFICANT CHANGE UP (ref 7.4–10.4)
POTASSIUM SERPL-MCNC: 4.4 MMOL/L — SIGNIFICANT CHANGE UP (ref 3.5–5)
POTASSIUM SERPL-SCNC: 4.4 MMOL/L — SIGNIFICANT CHANGE UP (ref 3.5–5)
RBC # BLD: 3.05 M/UL — LOW (ref 4.2–5.4)
RBC # FLD: 20.9 % — HIGH (ref 11.5–14.5)
SODIUM SERPL-SCNC: 136 MMOL/L — SIGNIFICANT CHANGE UP (ref 135–146)
SPECIMEN SOURCE: SIGNIFICANT CHANGE UP
WBC # BLD: 7.98 K/UL — SIGNIFICANT CHANGE UP (ref 4.8–10.8)
WBC # FLD AUTO: 7.98 K/UL — SIGNIFICANT CHANGE UP (ref 4.8–10.8)

## 2024-05-23 PROCEDURE — 71045 X-RAY EXAM CHEST 1 VIEW: CPT | Mod: 26

## 2024-05-23 PROCEDURE — 99232 SBSQ HOSP IP/OBS MODERATE 35: CPT

## 2024-05-23 RX ADMIN — DAPTOMYCIN 118 MILLIGRAM(S): 500 INJECTION, POWDER, LYOPHILIZED, FOR SOLUTION INTRAVENOUS at 11:43

## 2024-05-23 RX ADMIN — SCOPALAMINE 1 PATCH: 1 PATCH, EXTENDED RELEASE TRANSDERMAL at 19:04

## 2024-05-23 RX ADMIN — Medication 1 APPLICATION(S): at 06:23

## 2024-05-23 RX ADMIN — Medication 3 MILLILITER(S): at 08:30

## 2024-05-23 RX ADMIN — LEVETIRACETAM 750 MILLIGRAM(S): 250 TABLET, FILM COATED ORAL at 06:23

## 2024-05-23 RX ADMIN — Medication 3 MILLILITER(S): at 19:28

## 2024-05-23 RX ADMIN — Medication 1 APPLICATION(S): at 18:24

## 2024-05-23 RX ADMIN — Medication 1 DROP(S): at 06:23

## 2024-05-23 RX ADMIN — Medication 1 TABLET(S): at 15:07

## 2024-05-23 RX ADMIN — Medication 3 MILLILITER(S): at 13:23

## 2024-05-23 RX ADMIN — GABAPENTIN 300 MILLIGRAM(S): 400 CAPSULE ORAL at 06:23

## 2024-05-23 RX ADMIN — SCOPALAMINE 1 PATCH: 1 PATCH, EXTENDED RELEASE TRANSDERMAL at 06:45

## 2024-05-23 RX ADMIN — MEROPENEM 100 MILLIGRAM(S): 1 INJECTION INTRAVENOUS at 22:25

## 2024-05-23 RX ADMIN — RALOXIFENE HYDROCHLORIDE 60 MILLIGRAM(S): 60 TABLET, COATED ORAL at 12:42

## 2024-05-23 RX ADMIN — MEROPENEM 100 MILLIGRAM(S): 1 INJECTION INTRAVENOUS at 13:29

## 2024-05-23 RX ADMIN — GABAPENTIN 300 MILLIGRAM(S): 400 CAPSULE ORAL at 18:25

## 2024-05-23 RX ADMIN — MIDODRINE HYDROCHLORIDE 10 MILLIGRAM(S): 2.5 TABLET ORAL at 06:24

## 2024-05-23 RX ADMIN — MIDODRINE HYDROCHLORIDE 10 MILLIGRAM(S): 2.5 TABLET ORAL at 13:28

## 2024-05-23 RX ADMIN — ENOXAPARIN SODIUM 30 MILLIGRAM(S): 100 INJECTION SUBCUTANEOUS at 13:28

## 2024-05-23 RX ADMIN — FAMOTIDINE 20 MILLIGRAM(S): 10 INJECTION INTRAVENOUS at 18:25

## 2024-05-23 RX ADMIN — Medication 1 DROP(S): at 18:24

## 2024-05-23 RX ADMIN — MEROPENEM 100 MILLIGRAM(S): 1 INJECTION INTRAVENOUS at 06:23

## 2024-05-23 RX ADMIN — LEVETIRACETAM 750 MILLIGRAM(S): 250 TABLET, FILM COATED ORAL at 18:24

## 2024-05-23 RX ADMIN — MIDODRINE HYDROCHLORIDE 10 MILLIGRAM(S): 2.5 TABLET ORAL at 22:25

## 2024-05-23 RX ADMIN — FAMOTIDINE 20 MILLIGRAM(S): 10 INJECTION INTRAVENOUS at 06:23

## 2024-05-23 NOTE — PROGRESS NOTE ADULT - SUBJECTIVE AND OBJECTIVE BOX
24H events:    Patient is a 58y old Female who presents with a chief complaint of Pneumonia (22 May 2024 17:44)    Primary diagnosis of Acute sepsis        Today is hospital day 38d. This morning patient was seen and examined at bedside, resting comfortably in bed.    No acute or major events overnight.    Code Status:    Family communication:  Contact date:  Name of person contacted:  Relationship to patient:  Communication details:  What matters most:    PAST MEDICAL & SURGICAL HISTORY  Down syndrome    Osteoporosis    Mild anemia    Neuropathy    S/P debridement  of R hip on 3/2/21      SOCIAL HISTORY:  Social History:      ALLERGIES:  chlorhexidine containing compounds (Rash (Mild to Mod))    MEDICATIONS:  STANDING MEDICATIONS  albuterol/ipratropium for Nebulization 3 milliLiter(s) Nebulizer every 6 hours  AQUAPHOR (petrolatum Ointment) 1 Application(s) Topical two times a day  artificial  tears Solution 1 Drop(s) Both EYES two times a day  calcium carbonate   1250 mG (OsCal) 1 Tablet(s) Oral daily  DAPTOmycin IVPB 450 milliGRAM(s) IV Intermittent every 24 hours  enoxaparin Injectable 30 milliGRAM(s) SubCutaneous every 24 hours  famotidine    Tablet 20 milliGRAM(s) Oral two times a day  gabapentin Solution 300 milliGRAM(s) Oral two times a day  lactated ringers. 500 milliLiter(s) IV Continuous <Continuous>  levETIRAcetam  Solution 750 milliGRAM(s) Oral two times a day  meropenem  IVPB 1000 milliGRAM(s) IV Intermittent every 8 hours  midodrine 10 milliGRAM(s) Oral every 8 hours  raloxifene 60 milliGRAM(s) Oral daily  scopolamine 1 mG/72 Hr(s) Patch 1 Patch Transdermal every 72 hours  senna 2 Tablet(s) Oral at bedtime    PRN MEDICATIONS  acetaminophen     Tablet .. 650 milliGRAM(s) Oral every 6 hours PRN  melatonin 3 milliGRAM(s) Oral at bedtime PRN  ondansetron Injectable 4 milliGRAM(s) IV Push every 8 hours PRN    VITALS:   T(F): 98.6  HR: 106  BP: 108/58  RR: 17  SpO2: 92%    PHYSICAL EXAM:  GENERAL:   ( x ) NAD, lying in bed comfortably     (  ) obtunded     (  ) lethargic     (  ) somnolent    HEAD:   ( x ) Atraumatic     (  ) hematoma     (  ) laceration (specify location:       )     NECK:  ( x ) Supple     (  ) neck stiffness     (  ) nuchal rigidity     (  )  no JVD     (  ) JVD present ( -- cm)    HEART:  Rate -->     (  ) normal rate     (  ) bradycardic     (  ) tachycardic  Rhythm -->     ( x ) regular     (  ) regularly irregular     (  ) irregularly irregular  Murmurs -->     (  ) normal s1s2     (  ) systolic murmur     (  ) diastolic murmur     (  ) continuous murmur      (  ) S3 present     (  ) S4 present    LUNGS:   (x  )Unlabored respirations     (  ) tachypnea  ( x ) B/L air entry     (  ) decreased breath sounds in:  (location     )    (  ) no adventitious sound     (  ) crackles     (  ) wheezing      (  ) rhonchi      (specify location:       )  (  ) chest wall tenderness (specify location:       )    ABDOMEN:   ( x ) Soft     (  ) tense   |   (  x) nondistended     (  ) distended   |   (  ) +BS     (  ) hypoactive bowel sounds     (  ) hyperactive bowel sounds  ( x ) nontender     (  ) RUQ tenderness     (  ) RLQ tenderness     (  ) LLQ tenderness     (  ) epigastric tenderness     (  ) diffuse tenderness  (  ) Splenomegaly      (  ) Hepatomegaly      (  ) Jaundice     (  ) ecchymosis     EXTREMITIES:  (  ) Normal     (  ) Rash     (  ) ecchymosis     (  ) varicose veins      (  ) pitting edema     (  ) non-pitting edema   (  ) ulceration     (  ) gangrene:     (location:     )    NERVOUS SYSTEM:    (  ) A&Ox3     (  ) confused     (  ) lethargic  CN II-XII:     (  ) Intact     (  ) deficits found     (Specify:     )   Upper extremities:     (  ) no sensorimotor deficits     (  ) weakness     (  ) loss of proprioception/vibration     (  ) loss of touch/temperature (specify:    )  Lower extremities:     (  ) no sensorimotor deficits     (  ) weakness     (  ) loss of proprioception/vibration     (  ) loss of touch/temperature (specify:    )    SKIN:   (  ) No rashes or lesions     (  ) maculopapular rash     (  ) pustules     (  ) vesicles     (  ) ulcer     (  ) ecchymosis     (specify location:     )    AMPAC score:    (  ) Indwelling Madsen Catheter:   Date insterted:    Reason (  ) Critical illness     (  ) urinary retention    (  ) Accurate Ins/Outs Monitoring     (  ) CMO patient    (  ) Central Line:   Date inserted:  Location: (  ) Right IJ     (  ) Left IJ     (  ) Right Fem     (  ) Left Fem    (  ) SPC        (  ) pigtail       (  ) PEG tube       (  ) colostomy       (  ) jejunostomy  (  ) U-Dall    LABS:                        9.1    7.98  )-----------( 455      ( 23 May 2024 06:50 )             28.9         137  |  99  |  14  ----------------------------<  99  4.5   |  27  |  0.5<L>    Ca    8.7      22 May 2024 06:36        Urinalysis Basic - ( 22 May 2024 06:36 )    Color: x / Appearance: x / SG: x / pH: x  Gluc: 99 mg/dL / Ketone: x  / Bili: x / Urobili: x   Blood: x / Protein: x / Nitrite: x   Leuk Esterase: x / RBC: x / WBC x   Sq Epi: x / Non Sq Epi: x / Bacteria: x            Culture - Urine (collected 21 May 2024 14:20)  Source: Clean Catch Clean Catch (Midstream)  Final Report (22 May 2024 22:35):    <10,000 CFU/mL Normal Urogenital Mili          RADIOLOGY:      < from: US Abdomen Upper Quadrant Right (24 @ 22:49) >  IMPRESSION:    Extremely limited exam due to patient cooperation. Poorly visualized   liver. Cholelithiasis is seen.    --- End of Report ---    < end of copied text >          < from: NM Gastric Emptying Liquid (24 @ 13:35) >    Impression:  Normal emptying of radiolabeled meal from the stomach over time with 42%   retention at 1 hour (normal is < 51%).    --- End of Report ---      < end of copied text >  < from: Xray Chest 1 View- PORTABLE-Routine (24 @ 04:21) >  IMPRESSION:    Stable bilateral opacities, right greater than left.    --- End of Report ---    < end of copied text >  < from: CT Chest w/ IV Cont (24 @ 23:32) >  IMPRESSION:     Patulous distended fluid-filled esophagus associated with 7.4 cm hiatal   hernia (at risk for aspiration)..    Bilateral areas of consolidation both lower lobes.  Patchy opacity both lungs, right greater than left, similar to earlier   study. Interval resolution of previous small effusions.    Markedly distended urinary bladder with dependent debris, possible   hemorrhage. No bladder calculi. (). Correlate with urinalysis.      --- End of Report ---    < end of copied text >  < from: CT Abdomen and Pelvis w/ IV Cont (24 @ 23:32) >  IMPRESSION:     Patulous distended fluid-filled esophagus associated with 7.4 cm hiatal   hernia (at risk for aspiration)..    Bilateral areas of consolidation both lower lobes.  Patchy opacity both lungs, right greater than left, similar to earlier   study. Interval resolution of previous small effusions.    Markedly distended urinary bladder with dependent debris, possible   hemorrhage. No bladder calculi. (). Correlate with urinalysis.      --- End of Report ---    < end of copied text >      ASSESSMENT/PLAN:     58 year old female with PMH of Down Syndrome, cerebral palsy, seizure disorder presents from [facility] for respiratory distress. Admitted to SDU for severe sepsis & acute hypoxemic respiraotry failure [facility] 2/2 recurrent CAP.     #Acute Hypoxemic Respiratory Failure  #Severe sepsis with Septic Shock  #Possible Recurrent Aspiration (Despite having PEG)  - COVID/RVP/MRSA negative  - BCx 4/15: S. capitis  BCx 4/15 & : NGTD  - scopolamine for secretions  - aspiration precautions  - CT chest: bilateral opacities  -  & : spiked fever & WBCs  - BCx : NGTD  - BCX : NGTD  - -: overnight tachycardic 140s, hypotensive, febrile, given 1L LR bolus & then 500 cc for elevated lactate  - Spiked fever on   - s/p meropenem 1g q8h x multiple courses  - reduced volume of diet bolus to 250 & meropenem resumed, ID recalled on    - started on IV Daptomycin 450 mg q24h for E. faecium, E. faecalis, MRSE bacteremia   - Baseline & Weekly CK  - TTE pending  - CRP: 31.9   - ESR: 13  - RUQ US unremarkable    #Chronic Hypotension  - increased midodrine to 10 mg TID   - target MAP > 60  - started LR @ 60 cc/hr    #Rash 2/2 Contact Dermatitis likely 2/2 CHG - improving  - hold CHG wash  - c/w topical care & monitoring    #Episodes of Vomiting c/b Aspiration  - recurrent aspiration risk due to hiatal hernia  - on PEG feed, lower volume  - gastric emptying study negative    #B/l Foot Ulcers  - no abscess/cellulitis  - off loading  - wound care    #Seizure Disorder  #Down Syndrome  #Nonverbal   #Bedbound  #PEG tube  - c/w keppra  - CTSx recalled, G-tube unclogged                                           --------------------------------------------------------------    # DVT prophylaxis: Lovenox  # GI prophylaxis: PPI  # Diet:   # Activity:   # Code status: Full Code  # Disposition:    Pendin) TTE  2) BCx  3) UA

## 2024-05-23 NOTE — PROGRESS NOTE ADULT - ASSESSMENT
57yo F w/ pmhx of Down's syndrome, cerebral palsy, seizure disorder presents from [facility] for respiratory distress. Admitted to SDU for severe sepsis and acute hypoxemic respiratory failure likely secondary to recurrent CAP.     # Acute hypoxemic respiratory failure   #Severe sepsis with septic shock poa,    recurrent fever   new bacteremia, dw ID, added dapto,     Blood culture > growing VRE    AWAITING echo, rule out infective endocarditis     #possible recurrent pneumonia /suspected aspiration pna ( despite having peg )   #chronic hypotension   - covid/RSV, MSRA, negative; Bl cx with contaminant   - C/w Midodrine   - target MAP 60 ( Patient always has BP on the softer side )   - blood cx 4/15: staph capitis  - blood cx 4/15 and 4/17: NGTD  - Scopolamine for secretions. aspiration precautions   - Palliative care following   - F/u bcx 4/23 -> no growth to date  - CT chest angio w/ contrast 4/24: bilateral opacities   - currently on 3L NC   -  fungitell  >> nEG   -Blood cx >> NGTD 04/28  - 5/5-5/6:  overnight patient was tachy to 140s, hypotensive (around baseline), febrile to 38.5, given 1L LR bolus and then additional 500cc for elevated lactate of 3  - lactate improved overnight, tachy and febrile this am on 5/7/24, given 500cc bolus, CT showing asp pna,  - given 1x danna and vanco dose,    s/p  Meropenem  1g q8  x mutiple courses.   - we reduced volume of diet bolus to 250 cc due to suspected gastric distention /hernia, contributing to aspiration     # rash, probably contact dermatitis, improving   hold chlorhexidine wash, added topical care , monitor     #Episodes of Vomiting, complicated by Aspiration   - recurrent aspiration risk is high for coexisting hiatal hernia   - On PEG feed  , now on lower volume, tolerating   - gastric emptying study, neg     # B/l feet ulcers  - No abscess/cellulitis  - colonized with ESBL strain  - Off loading to prevent pressure sores   - wound care following   #seizure disorder, cont meds   #Down syndrome  #Nonverbal bedbound  # PEG tube   - c/w home meds    Overall prognosis poor,     #Progress Note Handoff    Pending : Bacteremia /TTE   Disposition: group home   code status: DNR, DNI

## 2024-05-23 NOTE — PROGRESS NOTE ADULT - SUBJECTIVE AND OBJECTIVE BOX
TEA ECHAVARRIA  58y  Female      Patient is a 58y old  Female who presents with a chief complaint of Pneumonia.    INTERVAL HPI/OVERNIGHT EVENTS:      ******************************* REVIEW OF SYSTEMS:**********************************************  All other review of systems negative    *********************** VITALS ******************************************    T(F): 98.6 (05-23-24 @ 14:12)  HR: 105 (05-23-24 @ 14:12) (105 - 115)  BP: 97/60 (05-23-24 @ 14:12) (84/49 - 122/78)  RR: 17 (05-23-24 @ 05:24) (17 - 18)  SpO2: 88% (05-23-24 @ 14:12) (84% - 92%)    05-22-24 @ 07:01  -  05-23-24 @ 07:00  --------------------------------------------------------  IN: 1350 mL / OUT: 1 mL / NET: 1349 mL            05-22-24 @ 07:01  -  05-23-24 @ 07:00  --------------------------------------------------------  IN: 1350 mL / OUT: 1 mL / NET: 1349 mL        ******************************** PHYSICAL EXAM:**************************************************  GENERAL: NAD    PSYCH: no agitation,   HEENT:     NERVOUS SYSTEM:  Alert & awake x 1-2  PULMONARY: MARIE, CTA    CARDIOVASCULAR: S1S2 RRR    GI: Soft, NT, ND; BS present. PEG     EXTREMITIES:  2+ Peripheral Pulses, No clubbing, cyanosis, or edema    LYMPH: No lymphadenopathy noted    SKIN: No rashes or lesions      **************************** LABS *******************************************************                          9.1    7.98  )-----------( 455      ( 23 May 2024 06:50 )             28.9     05-23    136  |  99  |  14  ----------------------------<  109<H>  4.4   |  28  |  <0.5<L>    Ca    8.7      23 May 2024 06:50  Mg     2.2     05-23        Urinalysis Basic - ( 23 May 2024 06:50 )    Color: x / Appearance: x / SG: x / pH: x  Gluc: 109 mg/dL / Ketone: x  / Bili: x / Urobili: x   Blood: x / Protein: x / Nitrite: x   Leuk Esterase: x / RBC: x / WBC x   Sq Epi: x / Non Sq Epi: x / Bacteria: x        Lactate Trend        CAPILLARY BLOOD GLUCOSE      POCT Blood Glucose.: 152 mg/dL (22 May 2024 21:25)          **************************Active Medications *******************************************  chlorhexidine containing compounds (Rash (Mild to Mod))      acetaminophen     Tablet .. 650 milliGRAM(s) Oral every 6 hours PRN  albuterol/ipratropium for Nebulization 3 milliLiter(s) Nebulizer every 6 hours  AQUAPHOR (petrolatum Ointment) 1 Application(s) Topical two times a day  artificial  tears Solution 1 Drop(s) Both EYES two times a day  calcium carbonate   1250 mG (OsCal) 1 Tablet(s) Oral daily  DAPTOmycin IVPB 450 milliGRAM(s) IV Intermittent every 24 hours  enoxaparin Injectable 30 milliGRAM(s) SubCutaneous every 24 hours  famotidine    Tablet 20 milliGRAM(s) Oral two times a day  gabapentin Solution 300 milliGRAM(s) Oral two times a day  lactated ringers. 500 milliLiter(s) IV Continuous <Continuous>  levETIRAcetam  Solution 750 milliGRAM(s) Oral two times a day  melatonin 3 milliGRAM(s) Oral at bedtime PRN  meropenem  IVPB 1000 milliGRAM(s) IV Intermittent every 8 hours  midodrine 10 milliGRAM(s) Oral every 8 hours  ondansetron Injectable 4 milliGRAM(s) IV Push every 8 hours PRN  raloxifene 60 milliGRAM(s) Oral daily  scopolamine 1 mG/72 Hr(s) Patch 1 Patch Transdermal every 72 hours  senna 2 Tablet(s) Oral at bedtime      ***************************************************  RADIOLOGY & ADDITIONAL TESTS:    Imaging Personally Reviewed:  [ ] YES  [ ] NO    HEALTH ISSUES - PROBLEM Dx:  Septic shock    Acute respiratory failure with hypoxia    Advanced care planning/counseling discussion    Encounter for palliative care

## 2024-05-24 ENCOUNTER — RESULT REVIEW (OUTPATIENT)
Age: 59
End: 2024-05-24

## 2024-05-24 LAB
ANION GAP SERPL CALC-SCNC: 12 MMOL/L — SIGNIFICANT CHANGE UP (ref 7–14)
BASOPHILS # BLD AUTO: 0.08 K/UL — SIGNIFICANT CHANGE UP (ref 0–0.2)
BASOPHILS NFR BLD AUTO: 0.6 % — SIGNIFICANT CHANGE UP (ref 0–1)
BUN SERPL-MCNC: 13 MG/DL — SIGNIFICANT CHANGE UP (ref 10–20)
CALCIUM SERPL-MCNC: 8.6 MG/DL — SIGNIFICANT CHANGE UP (ref 8.4–10.4)
CHLORIDE SERPL-SCNC: 98 MMOL/L — SIGNIFICANT CHANGE UP (ref 98–110)
CO2 SERPL-SCNC: 27 MMOL/L — SIGNIFICANT CHANGE UP (ref 17–32)
CREAT SERPL-MCNC: 0.5 MG/DL — LOW (ref 0.7–1.5)
EGFR: 109 ML/MIN/1.73M2 — SIGNIFICANT CHANGE UP
EOSINOPHIL # BLD AUTO: 0.19 K/UL — SIGNIFICANT CHANGE UP (ref 0–0.7)
EOSINOPHIL NFR BLD AUTO: 1.4 % — SIGNIFICANT CHANGE UP (ref 0–8)
GLUCOSE BLDC GLUCOMTR-MCNC: 106 MG/DL — HIGH (ref 70–99)
GLUCOSE BLDC GLUCOMTR-MCNC: 142 MG/DL — HIGH (ref 70–99)
GLUCOSE SERPL-MCNC: 89 MG/DL — SIGNIFICANT CHANGE UP (ref 70–99)
HCT VFR BLD CALC: 31.4 % — LOW (ref 37–47)
HGB BLD-MCNC: 9.6 G/DL — LOW (ref 12–16)
IMM GRANULOCYTES NFR BLD AUTO: 0.4 % — HIGH (ref 0.1–0.3)
LYMPHOCYTES # BLD AUTO: 1.73 K/UL — SIGNIFICANT CHANGE UP (ref 1.2–3.4)
LYMPHOCYTES # BLD AUTO: 12.9 % — LOW (ref 20.5–51.1)
MAGNESIUM SERPL-MCNC: 2.3 MG/DL — SIGNIFICANT CHANGE UP (ref 1.8–2.4)
MCHC RBC-ENTMCNC: 29.4 PG — SIGNIFICANT CHANGE UP (ref 27–31)
MCHC RBC-ENTMCNC: 30.6 G/DL — LOW (ref 32–37)
MCV RBC AUTO: 96.3 FL — SIGNIFICANT CHANGE UP (ref 81–99)
MONOCYTES # BLD AUTO: 0.56 K/UL — SIGNIFICANT CHANGE UP (ref 0.1–0.6)
MONOCYTES NFR BLD AUTO: 4.2 % — SIGNIFICANT CHANGE UP (ref 1.7–9.3)
NEUTROPHILS # BLD AUTO: 10.78 K/UL — HIGH (ref 1.4–6.5)
NEUTROPHILS NFR BLD AUTO: 80.5 % — HIGH (ref 42.2–75.2)
NRBC # BLD: 0 /100 WBCS — SIGNIFICANT CHANGE UP (ref 0–0)
PLATELET # BLD AUTO: 452 K/UL — HIGH (ref 130–400)
PMV BLD: 10.2 FL — SIGNIFICANT CHANGE UP (ref 7.4–10.4)
POTASSIUM SERPL-MCNC: 5.1 MMOL/L — HIGH (ref 3.5–5)
POTASSIUM SERPL-SCNC: 5.1 MMOL/L — HIGH (ref 3.5–5)
RBC # BLD: 3.26 M/UL — LOW (ref 4.2–5.4)
RBC # FLD: 20.7 % — HIGH (ref 11.5–14.5)
SODIUM SERPL-SCNC: 137 MMOL/L — SIGNIFICANT CHANGE UP (ref 135–146)
WBC # BLD: 13.39 K/UL — HIGH (ref 4.8–10.8)
WBC # FLD AUTO: 13.39 K/UL — HIGH (ref 4.8–10.8)

## 2024-05-24 PROCEDURE — 93320 DOPPLER ECHO COMPLETE: CPT | Mod: 26

## 2024-05-24 PROCEDURE — 93325 DOPPLER ECHO COLOR FLOW MAPG: CPT | Mod: 26

## 2024-05-24 PROCEDURE — 93312 ECHO TRANSESOPHAGEAL: CPT | Mod: 26

## 2024-05-24 PROCEDURE — 99232 SBSQ HOSP IP/OBS MODERATE 35: CPT

## 2024-05-24 RX ADMIN — MIDODRINE HYDROCHLORIDE 10 MILLIGRAM(S): 2.5 TABLET ORAL at 13:17

## 2024-05-24 RX ADMIN — MIDODRINE HYDROCHLORIDE 10 MILLIGRAM(S): 2.5 TABLET ORAL at 05:45

## 2024-05-24 RX ADMIN — ENOXAPARIN SODIUM 30 MILLIGRAM(S): 100 INJECTION SUBCUTANEOUS at 13:16

## 2024-05-24 RX ADMIN — MEROPENEM 100 MILLIGRAM(S): 1 INJECTION INTRAVENOUS at 21:20

## 2024-05-24 RX ADMIN — Medication 3 MILLILITER(S): at 20:10

## 2024-05-24 RX ADMIN — MEROPENEM 100 MILLIGRAM(S): 1 INJECTION INTRAVENOUS at 05:45

## 2024-05-24 RX ADMIN — GABAPENTIN 300 MILLIGRAM(S): 400 CAPSULE ORAL at 05:45

## 2024-05-24 RX ADMIN — SENNA PLUS 2 TABLET(S): 8.6 TABLET ORAL at 21:20

## 2024-05-24 RX ADMIN — MEROPENEM 100 MILLIGRAM(S): 1 INJECTION INTRAVENOUS at 13:16

## 2024-05-24 RX ADMIN — SCOPALAMINE 1 PATCH: 1 PATCH, EXTENDED RELEASE TRANSDERMAL at 07:07

## 2024-05-24 RX ADMIN — Medication 1 DROP(S): at 18:01

## 2024-05-24 RX ADMIN — SCOPALAMINE 1 PATCH: 1 PATCH, EXTENDED RELEASE TRANSDERMAL at 19:00

## 2024-05-24 RX ADMIN — FAMOTIDINE 20 MILLIGRAM(S): 10 INJECTION INTRAVENOUS at 05:45

## 2024-05-24 RX ADMIN — LEVETIRACETAM 750 MILLIGRAM(S): 250 TABLET, FILM COATED ORAL at 05:45

## 2024-05-24 RX ADMIN — Medication 1 TABLET(S): at 13:17

## 2024-05-24 RX ADMIN — RALOXIFENE HYDROCHLORIDE 60 MILLIGRAM(S): 60 TABLET, COATED ORAL at 13:17

## 2024-05-24 RX ADMIN — Medication 1 DROP(S): at 05:48

## 2024-05-24 RX ADMIN — Medication 3 MILLILITER(S): at 14:15

## 2024-05-24 RX ADMIN — Medication 1 APPLICATION(S): at 18:00

## 2024-05-24 RX ADMIN — MIDODRINE HYDROCHLORIDE 10 MILLIGRAM(S): 2.5 TABLET ORAL at 21:20

## 2024-05-24 RX ADMIN — SCOPALAMINE 1 PATCH: 1 PATCH, EXTENDED RELEASE TRANSDERMAL at 05:46

## 2024-05-24 RX ADMIN — FAMOTIDINE 20 MILLIGRAM(S): 10 INJECTION INTRAVENOUS at 18:00

## 2024-05-24 RX ADMIN — LEVETIRACETAM 750 MILLIGRAM(S): 250 TABLET, FILM COATED ORAL at 18:00

## 2024-05-24 RX ADMIN — Medication 1 APPLICATION(S): at 05:48

## 2024-05-24 RX ADMIN — Medication 3 MILLILITER(S): at 08:09

## 2024-05-24 RX ADMIN — DAPTOMYCIN 118 MILLIGRAM(S): 500 INJECTION, POWDER, LYOPHILIZED, FOR SOLUTION INTRAVENOUS at 09:29

## 2024-05-24 RX ADMIN — GABAPENTIN 300 MILLIGRAM(S): 400 CAPSULE ORAL at 18:00

## 2024-05-24 NOTE — PROGRESS NOTE ADULT - SUBJECTIVE AND OBJECTIVE BOX
24H events:    Patient is a 58y old Female who presents with a chief complaint of Pneumonia (23 May 2024 15:23)    Primary diagnosis of Acute sepsis    Today is hospital day 39d. This morning patient was seen and examined at bedside, resting comfortably in bed.    No acute or major events overnight.    Family communication:  Contact date:  Name of person contacted:  Relationship to patient:  Communication details:  What matters most:    PAST MEDICAL & SURGICAL HISTORY  Down syndrome    Osteoporosis    Mild anemia    Neuropathy    S/P debridement  of R hip on 3/2/21      SOCIAL HISTORY:  Social History:      ALLERGIES:  chlorhexidine containing compounds (Rash (Mild to Mod))    MEDICATIONS:  STANDING MEDICATIONS  albuterol/ipratropium for Nebulization 3 milliLiter(s) Nebulizer every 6 hours  AQUAPHOR (petrolatum Ointment) 1 Application(s) Topical two times a day  artificial  tears Solution 1 Drop(s) Both EYES two times a day  calcium carbonate   1250 mG (OsCal) 1 Tablet(s) Oral daily  DAPTOmycin IVPB 450 milliGRAM(s) IV Intermittent every 24 hours  enoxaparin Injectable 30 milliGRAM(s) SubCutaneous every 24 hours  famotidine    Tablet 20 milliGRAM(s) Oral two times a day  gabapentin Solution 300 milliGRAM(s) Oral two times a day  lactated ringers. 500 milliLiter(s) IV Continuous <Continuous>  levETIRAcetam  Solution 750 milliGRAM(s) Oral two times a day  meropenem  IVPB 1000 milliGRAM(s) IV Intermittent every 8 hours  midodrine 10 milliGRAM(s) Oral every 8 hours  raloxifene 60 milliGRAM(s) Oral daily  scopolamine 1 mG/72 Hr(s) Patch 1 Patch Transdermal every 72 hours  senna 2 Tablet(s) Oral at bedtime    PRN MEDICATIONS  acetaminophen     Tablet .. 650 milliGRAM(s) Oral every 6 hours PRN  melatonin 3 milliGRAM(s) Oral at bedtime PRN  ondansetron Injectable 4 milliGRAM(s) IV Push every 8 hours PRN    VITALS:   T(F): 99.8  HR: 73  BP: 97/64  RR: 18  SpO2: 94%    PHYSICAL EXAM:  GENERAL:   ( x ) NAD, lying in bed comfortably     (  ) obtunded     (  ) lethargic     (  ) somnolent    HEAD:   ( x ) Atraumatic     (  ) hematoma     (  ) laceration (specify location:       )     NECK:  ( x ) Supple     (  ) neck stiffness     (  ) nuchal rigidity     (  )  no JVD     (  ) JVD present ( -- cm)    HEART:  Rate -->     (  ) normal rate     (  ) bradycardic     (  ) tachycardic  Rhythm -->     ( x ) regular     (  ) regularly irregular     (  ) irregularly irregular  Murmurs -->     (  ) normal s1s2     (  ) systolic murmur     (  ) diastolic murmur     (  ) continuous murmur      (  ) S3 present     (  ) S4 present    LUNGS:   ( x )Unlabored respirations     (  ) tachypnea  ( x ) B/L air entry     (  ) decreased breath sounds in:  (location     )    (  ) no adventitious sound     (  ) crackles     (  ) wheezing      (  ) rhonchi      (specify location:       )  (  ) chest wall tenderness (specify location:       )    ABDOMEN:   ( x ) Soft     (  ) tense   |   ( x ) nondistended     (  ) distended   |   (  ) +BS     (  ) hypoactive bowel sounds     (  ) hyperactive bowel sounds  ( x ) nontender     (  ) RUQ  tenderness     (  ) RLQ tenderness     (  ) LLQ tenderness     (  ) epigastric tenderness     (  ) diffuse tenderness  (  ) Splenomegaly      (  ) Hepatomegaly      (  ) Jaundice     (  ) ecchymosis     EXTREMITIES:  (  ) Normal     (  ) Rash     (  ) ecchymosis     (  ) varicose veins      (  ) pitting edema     (  ) non-pitting edema   (  ) ulceration     (  ) gangrene:     (location:     )    NERVOUS SYSTEM:    (  ) A&Ox3     (  ) confused     (  ) lethargic  CN II-XII:     (  ) Intact     (  ) deficits found     (Specify:     )   Upper extremities:     (  ) no sensorimotor deficits     (  ) weakness     (  ) loss of proprioception/vibration     (  ) loss of touch/temperature (specify:    )  Lower extremities:     (  ) no sensorimotor deficits     (  ) weakness     (  ) loss of proprioception/vibration     (  ) loss of touch/temperature (specify:    )    SKIN:   (  ) No rashes or lesions     (  ) maculopapular rash     (  ) pustules     (  ) vesicles     (  ) ulcer     (  ) ecchymosis     (specify location:     )    AMPAC score:    (  ) Indwelling Madsen Catheter:   Date insterted:    Reason (  ) Critical illness     (  ) urinary retention    (  ) Accurate Ins/Outs Monitoring     (  ) CMO patient    (  ) Central Line:   Date inserted:  Location: (  ) Right IJ     (  ) Left IJ     (  ) Right Fem     (  ) Left Fem    (  ) SPC        (  ) pigtail       (  ) PEG tube       (  ) colostomy       (  ) jejunostomy  (  ) U-Dall    LABS:                        9.6    13.39 )-----------( 452      ( 24 May 2024 06:55 )             31.4     05-24    137  |  98  |  13  ----------------------------<  89  5.1<H>   |  27  |  0.5<L>    Ca    8.6      24 May 2024 06:55  Mg     2.3     05-24        Urinalysis Basic - ( 24 May 2024 06:55 )    Color: x / Appearance: x / SG: x / pH: x  Gluc: 89 mg/dL / Ketone: x  / Bili: x / Urobili: x   Blood: x / Protein: x / Nitrite: x   Leuk Esterase: x / RBC: x / WBC x   Sq Epi: x / Non Sq Epi: x / Bacteria: x      Culture - Blood (collected 22 May 2024 06:36)  Source: .Blood None  Preliminary Report (23 May 2024 17:02):    No growth at 24 hours    Culture - Urine (collected 21 May 2024 14:20)  Source: Clean Catch Clean Catch (Midstream)  Final Report (22 May 2024 22:35):    <10,000 CFU/mL Normal Urogenital Mili

## 2024-05-24 NOTE — PHARMACOTHERAPY INTERVENTION NOTE - NSPHARMCOMMASP
ASP - Lab/ test recommended
ASP - Therapy recommended/ Alternative therapy

## 2024-05-24 NOTE — PROGRESS NOTE ADULT - ASSESSMENT
58 year old female with PMH of Down Syndrome, cerebral palsy, seizure disorder presents from [facility] for respiratory distress. Admitted to SDU for severe sepsis & acute hypoxemic respiraotry failure leikly 2/2 recurrent CAP.     #Acute Hypoxemic Respiratory Failure  #Severe sepsis with Septic Shock  #Possible Recurrent Aspiration (Despite having PEG)  - COVID/RVP/MRSA negative  - BCx 4/15: S. capitis  - scopolamine for secretions  - aspiration precautions  - CT chest: bilateral opacities  -  & : spiked fever & WBCs  - s/p meropenem 1g q8h x multiple courses  - reduced volume of diet bolus to 250 & meropenem resumed, ID recalled on    - started on IV Daptomycin 450 mg q24h for E. faecium, E. faecalis, MRSE bacteremia   - Weekly CPK  - TTE pending r/o IE  - CRP: 31.9   - ESR: 13  - RUQ US unremarkable    #Chronic Hypotension  - increased midodrine to 10 mg TID   - target MAP > 60  - started LR @ 60 cc/hr    #Rash 2/2 Contact Dermatitis likely 2/2 CHG - improving  - hold CHG wash  - c/w topical care & monitoring    #Episodes of Vomiting c/b Aspiration  - recurrent aspiration risk due to hiatal hernia  - on PEG feed, lower volume  - gastric emptying study negative    #B/l Foot Ulcers  - no abscess/cellulitis  - off loading  - wound care    #Seizure Disorder  #Down Syndrome  #Nonverbal   #Bedbound  #PEG tube  - c/w keppra  - CTSx recalled, G-tube unclogged    # DVT prophylaxis: Lovenox  # GI prophylaxis: PPI  # Diet:   # Activity:   # Code status: Full Code  # Disposition:    Pendin) TTE  2) BCx  3) UA   58 year old female with PMH of Down Syndrome, cerebral palsy, seizure disorder presents from [facility] for respiratory distress. Admitted to SDU for severe sepsis & acute hypoxemic respiratory failure likely 2/2 recurrent CAP.     #Acute Hypoxemic Respiratory Failure  #Severe sepsis with Septic Shock  #Possible Recurrent Aspiration (Despite having PEG)  - COVID/RVP/MRSA negative  - BCx 4/15: S. capitis  - scopolamine for secretions  - aspiration precautions  - CT chest: bilateral opacities  -  & : spiked fever & WBCs  - s/p meropenem 1g q8h x multiple courses  - reduced volume of diet bolus to 250 & meropenem resumed, ID recalled on    - started on IV Daptomycin 450 mg q24h for E. faecium, E. faecalis, MRSE bacteremia   - Weekly CPK  - TTE pending r/o IE  - CRP: 31.9   - ESR: 13  - RUQ US unremarkable    #Chronic Hypotension  - increased midodrine to 10 mg TID   - target MAP > 60  - started LR @ 60 cc/hr    #Rash 2/2 Contact Dermatitis likely 2/2 CHG - improving  - hold CHG wash  - c/w topical care & monitoring    #Episodes of Vomiting c/b Aspiration  - recurrent aspiration risk due to hiatal hernia  - on PEG feed, lower volume  - gastric emptying study negative    #B/l Foot Ulcers  - no abscess/cellulitis  - off loading  - wound care    #Seizure Disorder  #Down Syndrome  #Nonverbal   #Bedbound  #PEG tube  - c/w keppra  - CTSx recalled, G-tube unclogged    # DVT prophylaxis: Lovenox  # GI prophylaxis: PPI  # Code status: Full Code    Pendin) TTE  2) BCx  3) UA

## 2024-05-24 NOTE — PROGRESS NOTE ADULT - SUBJECTIVE AND OBJECTIVE BOX
TEA ECHAVARRIA  58y, Female  Allergy: chlorhexidine containing compounds (Rash (Mild to Mod))      LOS  39d    CHIEF COMPLAINT: Pneumonia (24 May 2024 11:34)      INTERVAL EVENTS/HPI  - T(F): , Max: 99.8 (05-24-24 @ 05:00)  - WBC elevated today - no acute events  - WBC Count: 13.39 (05-24-24 @ 06:55)  WBC Count: 7.98 (05-23-24 @ 06:50)     - Creatinine: 0.5 (05-24-24 @ 06:55)  Creatinine: <0.5 (05-23-24 @ 06:50)       ROS  unable to obtain history secondary to patient's mental status and/or sedation    VITALS:  T(F): 99.8, Max: 99.8 (05-24-24 @ 05:00)  HR: 73  BP: 97/64  RR: 18Vital Signs Last 24 Hrs  T(C): 37.7 (24 May 2024 05:00), Max: 37.7 (24 May 2024 05:00)  T(F): 99.8 (24 May 2024 05:00), Max: 99.8 (24 May 2024 05:00)  HR: 73 (24 May 2024 09:35) (73 - 108)  BP: 97/64 (24 May 2024 05:00) (91/60 - 118/72)  BP(mean): --  RR: 18 (24 May 2024 09:35) (16 - 18)  SpO2: 94% (24 May 2024 09:35) (88% - 94%)    Parameters below as of 24 May 2024 09:35  Patient On (Oxygen Delivery Method): nasal cannula        PHYSICAL EXAM:  Gen: NAD, resting in bed  HEENT: Normocephalic, atraumatic  Neck: supple, no lymphadenopathy  CV: Regular rate & regular rhythm  Lungs: decreased BS at bases, no fremitus  Abdomen: Soft, BS present  Ext: Warm, well perfused  Neuro: non focal, awake  Skin: no rash, no erythema  Lines: no phlebitis    FH: Non-contributory  Social Hx: Non-contributory    TESTS & MEASUREMENTS:                        9.6    13.39 )-----------( 452      ( 24 May 2024 06:55 )             31.4     05-24    137  |  98  |  13  ----------------------------<  89  5.1<H>   |  27  |  0.5<L>    Ca    8.6      24 May 2024 06:55  Mg     2.3     05-24          Urinalysis Basic - ( 24 May 2024 06:55 )    Color: x / Appearance: x / SG: x / pH: x  Gluc: 89 mg/dL / Ketone: x  / Bili: x / Urobili: x   Blood: x / Protein: x / Nitrite: x   Leuk Esterase: x / RBC: x / WBC x   Sq Epi: x / Non Sq Epi: x / Bacteria: x        Culture - Blood (collected 05-22-24 @ 06:36)  Source: .Blood None  Preliminary Report (05-23-24 @ 17:02):    No growth at 24 hours    Culture - Urine (collected 05-21-24 @ 14:20)  Source: Clean Catch Clean Catch (Midstream)  Final Report (05-22-24 @ 22:35):    <10,000 CFU/mL Normal Urogenital Mili    Culture - Blood (collected 05-18-24 @ 22:29)  Source: .Blood Blood  Gram Stain (05-19-24 @ 17:12):    Growth in aerobic bottle: Gram Negative Rods and Gram positive cocci in    pairs  Final Report (05-23-24 @ 08:22):    Growth in aerobic bottle: Proteus mirabilis ESBL    Growth in aerobic bottle: Enterococcus faecalis (vancomycin resistant)    Growth in aerobic bottle: Enterococcus faecium (vancomycin resistant)    Direct identification is available within approximately3-5    hours either by Blood Panel Multiplexed PCR or Direct    MALDI-TOF. Details: [email or web address]    Vancomycin resistance gene detected    When multiple species' of Enterococcus are present, association of a    resistance gene to a specific organism cannot be determined.  Organism: Blood Culture PCR  Proteus mirabilis ESBL  Enterococcus faecalis (vancomycin resistant)  Enterococcus faecium (vancomycin resistant) (05-23-24 @ 08:22)  Organism: Enterococcus faecium (vancomycin resistant) (05-23-24 @ 08:22)      -  Streptomycin synergy: S <=1000      -  Daptomycin: SDD 2 The breakpoint for SDD (susceptible dose dependent)is based on a dosage regimen of 8-12 mg/kg administered every 24 h in adults and is intended for serious infections due to E. faecium. Consultation with an infectious diseases specialist is recommended.      -  Linezolid: S 2      -  Vancomycin: R >16      -  Ampicillin: R >8 Predicts results to ampicillin/sulbactam, amoxacillin-clavulanate and  piperacillin-tazobactam.      Method Type: ZANE      -  Gentamicin synergy: S <=500  Organism: Enterococcus faecalis (vancomycin resistant) (05-23-24 @ 08:22)      -  Streptomycin synergy: R >1000      -  Daptomycin: S 1      -  Linezolid: S 1      -  Vancomycin: R >16      -  Ampicillin: S <=2 Predicts results to ampicillin/sulbactam, amoxacillin-clavulanate and  piperacillin-tazobactam.      Method Type: ZANE      -  Gentamicin synergy: S <=500  Organism: Proteus mirabilis ESBL (05-23-24 @ 08:22)      -  Levofloxacin: R >4      -  Tobramycin: S <=2      -  Aztreonam: R <=4      -  Gentamicin: S <=2      -  Cefazolin: R >16      -  Cefepime: R 8      -  Piperacillin/Tazobactam: R <=8      -  Ciprofloxacin: R >2      -  Ceftriaxone: R 32      -  Ampicillin: R >16 These ampicillin results predict results for amoxicillin      Method Type: ZANE      -  Meropenem: S <=1      -  Ampicillin/Sulbactam: R <=4/2      -  Trimethoprim/Sulfamethoxazole: R >2/38      -  Ertapenem: S <=0.5  Organism: Blood Culture PCR (05-23-24 @ 08:22)      -  CTX-M Resistance Marker: Detec      -  Staphylococcus epidermidis, Methicillin resistant: Detec      -  Proteus species: Detec      Method Type: PCR      -  Enterococcus faecalis: Detec      -  Enterococcus faecium: Detec      -  ESBL: Detec    Culture - Blood (collected 05-18-24 @ 22:29)  Source: .Blood Blood  Gram Stain (05-20-24 @ 12:21):    Growth in aerobic bottle: Gram Positive Cocci in Clusters    Growth in anaerobic bottle: Gram Positive Cocci in Clusters  Final Report (05-21-24 @ 13:20):    Growth in aerobic and anaerobic bottles: Staphylococcus epidermidis    Isolation of Coagulase negative Staphylococcus from single blood culture    sets may represent    contamination. Contact the Microbiology Department at [phone] if    susceptibilitytesting is    clinically indicated.    Culture - Blood (collected 05-06-24 @ 04:20)  Source: .Blood Blood-Peripheral  Final Report (05-11-24 @ 14:01):    No growth at 5 days    Culture - Blood (collected 05-05-24 @ 17:54)  Source: .Blood Blood  Final Report (05-11-24 @ 01:00):    No growth at 5 days    Culture - Blood (collected 05-04-24 @ 07:07)  Source: .Blood None  Final Report (05-09-24 @ 18:00):    No growth at 5 days    Culture - Blood (collected 05-03-24 @ 18:00)  Source: .Blood Blood  Final Report (05-08-24 @ 23:10):    No growth at 5 days    Culture - Blood (collected 04-28-24 @ 07:23)  Source: .Blood None  Final Report (05-03-24 @ 17:00):    No growth at 5 days            INFECTIOUS DISEASES TESTING  Procalcitonin: 0.59 (05-06-24 @ 10:50)  Procalcitonin: 0.16 (05-03-24 @ 18:01)  Aspergillus Galactomannan Antigen: 0.03 (05-03-24 @ 18:01)  Fungitell: 86 (05-03-24 @ 18:00)  Fungitell: 53 (04-25-24 @ 21:33)  Fungitell: 53 (04-24-24 @ 05:57)  MRSA PCR Result.: Negative (04-16-24 @ 14:39)  Rapid RVP Result: NotDetec (04-16-24 @ 14:39)  Procalcitonin, Serum: 6.93 (04-16-24 @ 07:03)  MRSA PCR Result.: Negative (03-16-24 @ 23:45)  Fungitell: 81 (03-13-24 @ 17:24)  Procalcitonin, Serum: 0.19 (03-13-24 @ 07:08)  Fungitell: 73 (02-23-24 @ 18:19)  MRSA PCR Result.: Negative (02-20-24 @ 13:42)  Fungitell: 76 (02-19-24 @ 16:00)  Procalcitonin, Serum: 0.16 (02-19-24 @ 16:00)  Procalcitonin, Serum: 0.20 (02-19-24 @ 11:23)  Rapid RVP Result: NotDetec (02-17-24 @ 19:06)  Procalcitonin, Serum: 0.11 (02-17-24 @ 10:41)  MRSA PCR Result.: Negative (02-15-24 @ 06:20)  Procalcitonin, Serum: 0.10 (02-12-24 @ 11:17)  Rapid RVP Result: NotDetec (02-12-24 @ 10:28)  MRSA PCR Result.: Negative (02-12-24 @ 10:28)  Fungitell: 63 (02-06-24 @ 11:27)  Fungitell: 65 (02-06-24 @ 04:43)  Rapid RVP Result: NotDetec (02-04-24 @ 10:28)  MRSA PCR Result.: Negative (02-03-24 @ 21:32)  Procalcitonin, Serum: 0.15 (02-03-24 @ 11:13)  Procalcitonin, Serum: 0.22 (02-01-24 @ 16:00)  MRSA PCR Result.: Negative (01-22-24 @ 05:30)  Legionella Antigen, Urine: Negative (01-21-24 @ 05:00)  Rapid RVP Result: Detected (01-21-24 @ 00:58)  Procalcitonin, Serum: 0.51 (01-20-24 @ 21:23)  Fungitell: 325 (01-20-24 @ 21:23)  Fungitell: 138 (11-07-23 @ 08:08)  Fungitell: 115 (11-02-23 @ 11:40)  Procalcitonin, Serum: 0.04 (11-02-23 @ 11:40)  Procalcitonin, Serum: 0.26 (10-24-23 @ 11:00)  MRSA PCR Result.: Negative (10-17-23 @ 17:20)  Fungitell: >500 (10-17-23 @ 17:20)  Procalcitonin, Serum: 4.36 (10-17-23 @ 17:20)  Procalcitonin, Serum: 4.18 (10-17-23 @ 12:35)  Procalcitonin, Serum: 0.07 (10-15-23 @ 07:28)  COVID-19 PCR: NotDetec (10-12-23 @ 09:14)  Procalcitonin, Serum: 0.40 (10-07-23 @ 10:54)  Legionella Antigen, Urine: Negative (09-30-23 @ 14:36)  MRSA PCR Result.: Negative (09-29-23 @ 16:50)  Procalcitonin, Serum: 9.78 (08-12-23 @ 11:25)  MRSA PCR Result.: Negative (08-12-23 @ 06:10)  Rapid RVP Result: NotDetec (08-12-23 @ 01:33)  Procalcitonin, Serum: 0.63 (08-10-23 @ 08:46)  Procalcitonin, Serum: 7.82 (08-04-23 @ 16:13)  MRSA PCR Result.: Negative (08-04-23 @ 14:52)  Rapid RVP Result: NotDetec (08-04-23 @ 03:00)      INFLAMMATORY MARKERS  Sedimentation Rate, Erythrocyte: 13 mm/Hr (05-21-24 @ 07:06)  C-Reactive Protein: 31.9 mg/L (05-21-24 @ 07:06)  Sedimentation Rate, Erythrocyte: 100 mm/Hr (02-03-24 @ 11:13)  C-Reactive Protein, Serum: 214.2 mg/L (02-03-24 @ 11:13)      RADIOLOGY & ADDITIONAL TESTS:  I have personally reviewed the last available Chest xray  CXR  Xray Chest 1 View- PORTABLE-Urgent:   ACC: [id number] EXAM:  XR CHEST PORTABLE URGENT 1V   ORDERED BY: CATHLEEN DELGADO     PROCEDURE DATE:  05/23/2024          INTERPRETATION:  Clinical History / Reason for exam: Febrile, wheezing    Comparison : Chest radiograph 5/9/2024.    Technique/Positioning: Single AP chest radiograph.    Findings:    Support devices: None.    Cardiac/mediastinum/hilum: Stable    Lung parenchyma/Pleura: Bilateral airspace opacities, right greater than   left, stable. No pneumothorax.    Skeleton/soft tissues: Stable    Impression:    Stable right greater than left airspace opacities.        --- End of Report ---            EMI COFFEY MD; Attending Radiologist  This document has been electronically signed. May 23 2024  8:20AM (05-23-24 @ 08:02)      CT      CARDIOLOGY TESTING  12 Lead ECG:   Ventricular Rate 101 BPM    Atrial Rate 101 BPM    P-R Interval 126 ms    QRS Duration 66 ms    Q-T Interval 346 ms    QTC Calculation(Bazett) 448 ms    P Axis 31 degrees    R Axis 54 degrees    T Axis 39 degrees    Diagnosis Line Sinus tachycardia  Otherwise normal ECG    Confirmed by Behuria, Supreeti ([id number]) on 5/12/2024 2:52:46 PM (05-11-24 @ 23:29)      MEDICATIONS  albuterol/ipratropium for Nebulization 3 Nebulizer every 6 hours  AQUAPHOR (petrolatum Ointment) 1 Topical two times a day  artificial  tears Solution 1 Both EYES two times a day  calcium carbonate   1250 mG (OsCal) 1 Oral daily  DAPTOmycin IVPB 450 IV Intermittent every 24 hours  enoxaparin Injectable 30 SubCutaneous every 24 hours  famotidine    Tablet 20 Oral two times a day  gabapentin Solution 300 Oral two times a day  lactated ringers. 500 IV Continuous <Continuous>  levETIRAcetam  Solution 750 Oral two times a day  meropenem  IVPB 1000 IV Intermittent every 8 hours  midodrine 10 Oral every 8 hours  raloxifene 60 Oral daily  scopolamine 1 mG/72 Hr(s) Patch 1 Transdermal every 72 hours  senna 2 Oral at bedtime      WEIGHT  Weight (kg): 42.7 (05-14-24 @ 02:54)  Creatinine: 0.5 mg/dL (05-24-24 @ 06:55)      ANTIBIOTICS:  DAPTOmycin IVPB 450 milliGRAM(s) IV Intermittent every 24 hours  meropenem  IVPB 1000 milliGRAM(s) IV Intermittent every 8 hours      All available historical records have been reviewed

## 2024-05-24 NOTE — PHARMACOTHERAPY INTERVENTION NOTE - COMMENTS
As per policy, ordered a creatine kinase level in the setting of daptomycin therapy.  
Recommended obtaining baseline CK today at 1100 since patient is being started on daptomycin. 
As per policy, discontinued duplicate creatine kinase level.    Dio Duong, PharmD, [facility]  Clinical Pharmacy Specialist, Infectious Diseases  Tele-Antimicrobial Stewardship Program (Tele-ASP)  Tele-ASP Phone: [phone] 
As per policy, discontinued duplicate creatine kinase level.    Dio Duong, PharmD, [facility]  Clinical Pharmacy Specialist, Infectious Diseases  Tele-Antimicrobial Stewardship Program (Tele-ASP)  Tele-ASP Phone: [phone] 
Blood culture from 5/18 growing methicillin resistant Staph epidermidis, enterococcus faecium, enterococcus faecalis, and ESBL+ Proteus species. Patient is on meropenem, but the gram-positive organisms are not currently covered. Recommended initiating daptomycin 450mg (~10.5mg/kg) IV q24h STAT.

## 2024-05-24 NOTE — PROGRESS NOTE ADULT - ASSESSMENT
· Assessment	  58 MARÍA w a PMH of Trisomy 21, a recent admission [id number] 8 for RSV PNA w MICU stay (never intubated), nonverbal at baseline, hypotension, cerebral palsey w seizure disorder, ESBL isolated from ulcer of rt foot [facility]. Patient SOA w , RR 22 and febrile to 102.8 rectally. Patient also hypotensive to 82/51. Labs notable for leukocytosis of 12.25 w 3.6% bands, large platelets and a compensated Respiratory acidosis. Imaging notable for a Rt Mid lobe ground glass opacification. Patient MRSA Negative w RVP negative for COVID, RSV and Flu.       IMPRESSION/RECOMMENDATIONS  #Aspiration with suspected GNR  pneumoniaa - treated with course of pneuomnia   #Fever 5/18  with polymicrobial bacteremia  - Blood Cx 5/18 - GPC in clusters, E faecalis, E faecium, Proteus mirabilis, ESBL Gene dtected   - Blood Cx 5/22 NG     #Feet with pressure related ulcers. No abscess/cellulitis    Recommendations  -continue  daptomycin 10 mg/kg q 24 hours   -- check CK weekly  - continue meropenempenem 1g q 8 hours   - check TTE for completness   - plan for 7 day course 5/20-5/26 and then monitor off   - trend WBC -- elevated today      Please call or message on [employer] Teams if with any questions.  Spectra [phone]

## 2024-05-24 NOTE — PROGRESS NOTE ADULT - TIME-BASED BILLING (NON-CRITICAL CARE)
Time-based billing (NON-critical care)
Drain evaluation
Time-based billing (NON-critical care)

## 2024-05-24 NOTE — PROGRESS NOTE ADULT - TIME BILLING
Counseled staff about diagnostic testing and treatment plan. All questions answered. Abnormal lab/radiographical/microbiological data reviewed.
Patient seen at bedside, time spent evaluating and treating the patient's acute illness as well as time spent reviewing labs, radiology, discussing with patient and/or patient's family and discussing the case with a multidisciplinary team.
Counseled staff about diagnostic testing and treatment plan. All questions answered. Abnormal lab/radiographical/microbiological data reviewed.
I have personally seen and examined this patient.    I have reviewed all pertinent clinical information and reviewed all relevant imaging and diagnostic studies personally.   I counseled the patient about diagnostic testing and treatment plan. All questions were answered.   I discussed recommendations with the primary team.
Patient seen at bedside, time spent evaluating and treating the patient's acute illness as well as time spent reviewing labs, radiology, discussing with patient and/or patient's family and discussing the case with a multidisciplinary team.
Patient seen at bedside, time spent evaluating and treating the patient's acute illness as well as time spent reviewing labs, radiology, discussing with patient and/or patient's family and discussing the case with a multidisciplinary team.
I have personally seen and examined this patient.    I have reviewed all pertinent clinical information and reviewed all relevant imaging and diagnostic studies personally.   I counseled the patient about diagnostic testing and treatment plan. All questions were answered.   I discussed recommendations with the primary team.
Counseled team about diagnostic testing and treatment plan. All questions answered. Abnormal lab/radiographical/microbiological data reviewed.
Counseled staff about diagnostic testing and treatment plan. All questions answered. Abnormal lab/radiographical/microbiological data reviewed.

## 2024-05-25 LAB
ALBUMIN SERPL ELPH-MCNC: 2.7 G/DL — LOW (ref 3.5–5.2)
ALP SERPL-CCNC: 87 U/L — SIGNIFICANT CHANGE UP (ref 30–115)
ALT FLD-CCNC: 8 U/L — SIGNIFICANT CHANGE UP (ref 0–41)
ANION GAP SERPL CALC-SCNC: 11 MMOL/L — SIGNIFICANT CHANGE UP (ref 7–14)
AST SERPL-CCNC: 14 U/L — SIGNIFICANT CHANGE UP (ref 0–41)
BASOPHILS # BLD AUTO: 0.06 K/UL — SIGNIFICANT CHANGE UP (ref 0–0.2)
BASOPHILS NFR BLD AUTO: 0.4 % — SIGNIFICANT CHANGE UP (ref 0–1)
BILIRUB SERPL-MCNC: <0.2 MG/DL — SIGNIFICANT CHANGE UP (ref 0.2–1.2)
BUN SERPL-MCNC: 14 MG/DL — SIGNIFICANT CHANGE UP (ref 10–20)
CALCIUM SERPL-MCNC: 8.5 MG/DL — SIGNIFICANT CHANGE UP (ref 8.4–10.5)
CHLORIDE SERPL-SCNC: 99 MMOL/L — SIGNIFICANT CHANGE UP (ref 98–110)
CO2 SERPL-SCNC: 27 MMOL/L — SIGNIFICANT CHANGE UP (ref 17–32)
CREAT SERPL-MCNC: 0.5 MG/DL — LOW (ref 0.7–1.5)
EGFR: 109 ML/MIN/1.73M2 — SIGNIFICANT CHANGE UP
EOSINOPHIL # BLD AUTO: 0.33 K/UL — SIGNIFICANT CHANGE UP (ref 0–0.7)
EOSINOPHIL NFR BLD AUTO: 2.2 % — SIGNIFICANT CHANGE UP (ref 0–8)
GLUCOSE BLDC GLUCOMTR-MCNC: 106 MG/DL — HIGH (ref 70–99)
GLUCOSE BLDC GLUCOMTR-MCNC: 164 MG/DL — HIGH (ref 70–99)
GLUCOSE SERPL-MCNC: 136 MG/DL — HIGH (ref 70–99)
HCT VFR BLD CALC: 29.5 % — LOW (ref 37–47)
HGB BLD-MCNC: 9 G/DL — LOW (ref 12–16)
IMM GRANULOCYTES NFR BLD AUTO: 0.3 % — SIGNIFICANT CHANGE UP (ref 0.1–0.3)
LYMPHOCYTES # BLD AUTO: 1.3 K/UL — SIGNIFICANT CHANGE UP (ref 1.2–3.4)
LYMPHOCYTES # BLD AUTO: 8.8 % — LOW (ref 20.5–51.1)
MAGNESIUM SERPL-MCNC: 2.2 MG/DL — SIGNIFICANT CHANGE UP (ref 1.8–2.4)
MCHC RBC-ENTMCNC: 29.4 PG — SIGNIFICANT CHANGE UP (ref 27–31)
MCHC RBC-ENTMCNC: 30.5 G/DL — LOW (ref 32–37)
MCV RBC AUTO: 96.4 FL — SIGNIFICANT CHANGE UP (ref 81–99)
MONOCYTES # BLD AUTO: 0.46 K/UL — SIGNIFICANT CHANGE UP (ref 0.1–0.6)
MONOCYTES NFR BLD AUTO: 3.1 % — SIGNIFICANT CHANGE UP (ref 1.7–9.3)
NEUTROPHILS # BLD AUTO: 12.55 K/UL — HIGH (ref 1.4–6.5)
NEUTROPHILS NFR BLD AUTO: 85.2 % — HIGH (ref 42.2–75.2)
NRBC # BLD: 0 /100 WBCS — SIGNIFICANT CHANGE UP (ref 0–0)
PLATELET # BLD AUTO: 354 K/UL — SIGNIFICANT CHANGE UP (ref 130–400)
PMV BLD: 10.1 FL — SIGNIFICANT CHANGE UP (ref 7.4–10.4)
POTASSIUM SERPL-MCNC: 4.7 MMOL/L — SIGNIFICANT CHANGE UP (ref 3.5–5)
POTASSIUM SERPL-SCNC: 4.7 MMOL/L — SIGNIFICANT CHANGE UP (ref 3.5–5)
PROT SERPL-MCNC: 5.8 G/DL — LOW (ref 6–8)
RBC # BLD: 3.06 M/UL — LOW (ref 4.2–5.4)
RBC # FLD: 20.5 % — HIGH (ref 11.5–14.5)
SODIUM SERPL-SCNC: 137 MMOL/L — SIGNIFICANT CHANGE UP (ref 135–146)
WBC # BLD: 14.75 K/UL — HIGH (ref 4.8–10.8)
WBC # FLD AUTO: 14.75 K/UL — HIGH (ref 4.8–10.8)

## 2024-05-25 PROCEDURE — 99232 SBSQ HOSP IP/OBS MODERATE 35: CPT

## 2024-05-25 RX ADMIN — Medication 1 DROP(S): at 06:04

## 2024-05-25 RX ADMIN — SCOPALAMINE 1 PATCH: 1 PATCH, EXTENDED RELEASE TRANSDERMAL at 07:48

## 2024-05-25 RX ADMIN — LEVETIRACETAM 750 MILLIGRAM(S): 250 TABLET, FILM COATED ORAL at 17:25

## 2024-05-25 RX ADMIN — MIDODRINE HYDROCHLORIDE 10 MILLIGRAM(S): 2.5 TABLET ORAL at 22:27

## 2024-05-25 RX ADMIN — Medication 1 TABLET(S): at 11:47

## 2024-05-25 RX ADMIN — DAPTOMYCIN 118 MILLIGRAM(S): 500 INJECTION, POWDER, LYOPHILIZED, FOR SOLUTION INTRAVENOUS at 09:52

## 2024-05-25 RX ADMIN — MEROPENEM 100 MILLIGRAM(S): 1 INJECTION INTRAVENOUS at 13:53

## 2024-05-25 RX ADMIN — MIDODRINE HYDROCHLORIDE 10 MILLIGRAM(S): 2.5 TABLET ORAL at 06:03

## 2024-05-25 RX ADMIN — Medication 3 MILLILITER(S): at 11:08

## 2024-05-25 RX ADMIN — ENOXAPARIN SODIUM 30 MILLIGRAM(S): 100 INJECTION SUBCUTANEOUS at 13:53

## 2024-05-25 RX ADMIN — GABAPENTIN 300 MILLIGRAM(S): 400 CAPSULE ORAL at 06:02

## 2024-05-25 RX ADMIN — LEVETIRACETAM 750 MILLIGRAM(S): 250 TABLET, FILM COATED ORAL at 06:02

## 2024-05-25 RX ADMIN — Medication 1 DROP(S): at 17:26

## 2024-05-25 RX ADMIN — Medication 3 MILLILITER(S): at 19:32

## 2024-05-25 RX ADMIN — Medication 1 APPLICATION(S): at 17:25

## 2024-05-25 RX ADMIN — MIDODRINE HYDROCHLORIDE 10 MILLIGRAM(S): 2.5 TABLET ORAL at 13:53

## 2024-05-25 RX ADMIN — FAMOTIDINE 20 MILLIGRAM(S): 10 INJECTION INTRAVENOUS at 06:03

## 2024-05-25 RX ADMIN — GABAPENTIN 300 MILLIGRAM(S): 400 CAPSULE ORAL at 17:25

## 2024-05-25 RX ADMIN — FAMOTIDINE 20 MILLIGRAM(S): 10 INJECTION INTRAVENOUS at 17:25

## 2024-05-25 RX ADMIN — Medication 3 MILLILITER(S): at 16:07

## 2024-05-25 RX ADMIN — RALOXIFENE HYDROCHLORIDE 60 MILLIGRAM(S): 60 TABLET, COATED ORAL at 11:47

## 2024-05-25 RX ADMIN — Medication 1 APPLICATION(S): at 06:02

## 2024-05-25 RX ADMIN — MEROPENEM 100 MILLIGRAM(S): 1 INJECTION INTRAVENOUS at 06:19

## 2024-05-25 NOTE — PROGRESS NOTE ADULT - SUBJECTIVE AND OBJECTIVE BOX
TEA ECHAVARRIA  [facility] 025 A ([facility])        Patient was evaluated and examined  by bedside, remains afebrile, tolerating peg feedings      REVIEW OF SYSTEMS: unable to obtain, patient is non-verbal       T(C): , Max: 37 (05-24-24 @ 12:45)  HR: 101 (05-25-24 @ 04:55)  BP: 103/69 (05-25-24 @ 04:55)  RR: 18 (05-25-24 @ 04:55)  SpO2: 94% (05-25-24 @ 04:55)  CAPILLARY BLOOD GLUCOSE      POCT Blood Glucose.: 164 mg/dL (25 May 2024 07:38)      PHYSICAL EXAM:  General: NAD, Awake, patient is laying comfortably in bed  HEENT: AT, NC, Supple, NO JVD, NO CB  Lungs: mild decreased breath sounds  B/L, no wheezing, mild b/l  rhonchi present  CVS: normal S1, S2, RRR, NO M/G/R  Abdomen: soft, bowel sounds present, non-tender, non-distended, peg present  Extremities: contracted extremities, no edema, no clubbing, no cyanosis, positive peripheral pulses b/l  Neuro: chronic , non-verbal, bed-confinement status  Skin: no rash, no ecchymosis      LAB  CBC  Date: 05-25-24 @ 07:35  Mean cell Etemuwrpqx23.4  Mean cell Hemoglobin Conc30.5  Mean cell Volum 96.4  Platelet count-Automate 354  RBC Count 3.06  Red Cell Distrib Width20.5  WBC Count14.75  % Albumin, Urine--  Hematocrit 29.5  Hemoglobin 9.0  CBC  Date: 05-24-24 @ 06:55  Mean cell Pmfkopbsax95.4  Mean cell Hemoglobin Conc30.6  Mean cell Volum 96.3  Platelet count-Automate 452  RBC Count 3.26  Red Cell Distrib Width20.7  WBC Count13.39  % Albumin, Urine--  Hematocrit 31.4  Hemoglobin 9.6  CBC  Date: 05-23-24 @ 06:50  Mean cell Opiofwaert64.8  Mean cell Hemoglobin Conc31.5  Mean cell Volum 94.8  Platelet count-Automate 455  RBC Count 3.05  Red Cell Distrib Width20.9  WBC Count7.98  % Albumin, Urine--  Hematocrit 28.9  Hemoglobin 9.1        BMP  05-25-24 @ 07:35  Blood Gas Arterial-Calcium,Ionized--  Blood Urea Nitrogen, Serum 14 mg/dL [10 - 20]  Carbon Dioxide, Serum27 mmol/L [17 - 32]  Chloride, Serum99 mmol/L [98 - 110]  Creatinie, Serum0.5 mg/dL<L> [0.7 - 1.5]  Glucose, Uvhck366 mg/dL<H> [70 - 99]  Potassium, Serum4.7 mmol/L [3.5 - 5.0]  Sodium, Serum 137 mmol/L [135 - 146]  BMP  05-24-24 @ 06:55  Blood Gas Arterial-Calcium,Ionized--  Blood Urea Nitrogen, Serum 13 mg/dL [10 - 20]  Carbon Dioxide, Serum27 mmol/L [17 - 32]  Chloride, Serum98 mmol/L [98 - 110]  Creatinie, Serum0.5 mg/dL<L> [0.7 - 1.5]  Glucose, Serum89 mg/dL [70 - 99]  Potassium, Serum5.1 mmol/L<H> [3.5 - 5.0] [Slighty Hemolyzed use with Caution]  Sodium, Serum 137 mmol/L [135 - 146]          Microbiology:    Culture - Blood (collected 05-23-24 @ 11:56)  Source: .Blood None  Preliminary Report (05-24-24 @ 22:02):    No growth at 24 hours    Culture - Blood (collected 05-22-24 @ 06:36)  Source: .Blood None  Preliminary Report (05-24-24 @ 17:01):    No growth at 48 Hours    Culture - Urine (collected 05-21-24 @ 14:20)  Source: Clean Catch Clean Catch (Midstream)  Final Report (05-22-24 @ 22:35):    <10,000 CFU/mL Normal Urogenital Mili    Culture - Blood (collected 05-18-24 @ 22:29)  Source: .Blood Blood  Gram Stain (05-20-24 @ 12:21):    Growth in aerobic bottle: Gram Positive Cocci in Clusters    Growth in anaerobic bottle: Gram Positive Cocci in Clusters  Final Report (05-21-24 @ 13:20):    Growth in aerobic and anaerobic bottles: Staphylococcus epidermidis    Isolation of Coagulase negative Staphylococcus from single blood culture    sets may represent    contamination. Contact the Microbiology Department at [phone] if    susceptibilitytesting is    clinically indicated.    Culture - Blood (collected 05-18-24 @ 22:29)  Source: .Blood Blood  Gram Stain (05-19-24 @ 17:12):    Growth in aerobic bottle: Gram Negative Rods and Gram positive cocci in    pairs  Final Report (05-23-24 @ 08:22):    Growth in aerobic bottle: Proteus mirabilis ESBL    Growth in aerobic bottle: Enterococcus faecalis (vancomycin resistant)    Growth in aerobic bottle: Enterococcus faecium (vancomycin resistant)    Direct identification is available within approximately3-5    hours either by Blood Panel Multiplexed PCR or Direct    MALDI-TOF. Details: [email or web address]    Vancomycin resistance gene detected    When multiple species' of Enterococcus are present, association of a    resistance gene to a specific organism cannot be determined.  Organism: Blood Culture PCR  Proteus mirabilis ESBL  Enterococcus faecalis (vancomycin resistant)  Enterococcus faecium (vancomycin resistant) (05-23-24 @ 08:22)  Organism: Enterococcus faecium (vancomycin resistant) (05-23-24 @ 08:22)      Method Type: ZANE      -  Ampicillin: R >8 Predicts results to ampicillin/sulbactam, amoxacillin-clavulanate and  piperacillin-tazobactam.      -  Daptomycin: SDD 2 The breakpoint for SDD (susceptible dose dependent)is based on a dosage regimen of 8-12 mg/kg administered every 24 h in adults and is intended for serious infections due to E. faecium. Consultation with an infectious diseases specialist is recommended.      -  Linezolid: S 2      -  Vancomycin: R >16      -  Gentamicin synergy: S <=500      -  Streptomycin synergy: S <=1000  Organism: Enterococcus faecalis (vancomycin resistant) (05-23-24 @ 08:22)      Method Type: ZANE      -  Ampicillin: S <=2 Predicts results to ampicillin/sulbactam, amoxacillin-clavulanate and  piperacillin-tazobactam.      -  Daptomycin: S 1      -  Linezolid: S 1      -  Vancomycin: R >16      -  Gentamicin synergy: S <=500      -  Streptomycin synergy: R >1000  Organism: Proteus mirabilis ESBL (05-23-24 @ 08:22)      Method Type: ZANE      -  Ampicillin: R >16 These ampicillin results predict results for amoxicillin      -  Ampicillin/Sulbactam: R <=4/2      -  Aztreonam: R <=4      -  Cefazolin: R >16      -  Cefepime: R 8      -  Ceftriaxone: R 32      -  Ciprofloxacin: R >2      -  Ertapenem: S <=0.5      -  Gentamicin: S <=2      -  Levofloxacin: R >4      -  Meropenem: S <=1      -  Piperacillin/Tazobactam: R <=8      -  Tobramycin: S <=2      -  Trimethoprim/Sulfamethoxazole: R >2/38  Organism: Blood Culture PCR (05-23-24 @ 08:22)      Method Type: PCR      -  Proteus species: Detec      -  Enterococcus faecalis: Detec      -  Enterococcus faecium: Detec      -  Staphylococcus epidermidis, Methicillin resistant: Detec      -  ESBL: Detec      -  CTX-M Resistance Marker: Detec    Culture - Blood (collected 05-06-24 @ 04:20)  Source: .Blood Blood-Peripheral  Final Report (05-11-24 @ 14:01):    No growth at 5 days    Culture - Blood (collected 05-05-24 @ 17:54)  Source: .Blood Blood  Final Report (05-11-24 @ 01:00):    No growth at 5 days    Culture - Blood (collected 05-04-24 @ 07:07)  Source: .Blood None  Final Report (05-09-24 @ 18:00):    No growth at 5 days    Culture - Blood (collected 05-03-24 @ 18:00)  Source: .Blood Blood  Final Report (05-08-24 @ 23:10):    No growth at 5 days    Culture - Blood (collected 04-28-24 @ 07:23)  Source: .Blood None  Final Report (05-03-24 @ 17:00):    No growth at 5 days        Medications:  acetaminophen     Tablet .. 650 milliGRAM(s) Oral every 6 hours PRN  albuterol/ipratropium for Nebulization 3 milliLiter(s) Nebulizer every 6 hours  AQUAPHOR (petrolatum Ointment) 1 Application(s) Topical two times a day  artificial  tears Solution 1 Drop(s) Both EYES two times a day  calcium carbonate   1250 mG (OsCal) 1 Tablet(s) Oral daily  DAPTOmycin IVPB 450 milliGRAM(s) IV Intermittent every 24 hours  enoxaparin Injectable 30 milliGRAM(s) SubCutaneous every 24 hours  famotidine    Tablet 20 milliGRAM(s) Oral two times a day  gabapentin Solution 300 milliGRAM(s) Oral two times a day  lactated ringers. 500 milliLiter(s) IV Continuous <Continuous>  levETIRAcetam  Solution 750 milliGRAM(s) Oral two times a day  melatonin 3 milliGRAM(s) Oral at bedtime PRN  meropenem  IVPB 1000 milliGRAM(s) IV Intermittent every 8 hours  midodrine 10 milliGRAM(s) Oral every 8 hours  ondansetron Injectable 4 milliGRAM(s) IV Push every 8 hours PRN  raloxifene 60 milliGRAM(s) Oral daily  scopolamine 1 mG/72 Hr(s) Patch 1 Patch Transdermal every 72 hours  senna 2 Tablet(s) Oral at bedtime        Assessment and Plan:  Patient is a 59y/o Female  w/ pmhx of Down's syndrome, cerebral palsy, seizure disorder presents from [facility] for respiratory distress. Admitted to SDU for severe sepsis and acute hypoxemic respiratory failure likely secondary to recurrent CAP.     # Acute hypoxemic respiratory failure   #Severe sepsis with septic shock poa,    VRE bacteremia,    Blood culture grew  VRE ,repeated blood cxs neg  - s/p TTE- no endocarditis , IV Dapto , IV Alicia x 1 week as per ID rec. , to complete on 5/26    #possible recurrent pneumonia /suspected aspiration pna ( despite having peg )   #chronic hypotension   - covid/RSV, MSRA, negative; Bl cx with contaminant   - Hypotension C/w Midodrine   - target MAP 60 ( Patient always has BP on the softer side )   - blood cx 4/15: staph capitis,  blood cxs 4/15 and 4/17: NGTD  - CT chest angio w/ contrast 4/24: bilateral opacities   -  s/p  Meropenem  1g q8  x mutiple courses.        # rash, probably contact dermatitis, improving   hold chlorhexidine wash, added topical care , monitor     #Episodes of Vomiting, complicated by Aspiration   - recurrent aspiration risk is high for coexisting hiatal hernia   - On PEG feed  , now on lower volume, tolerating   - gastric emptying study, neg     # B/l feet ulcers  - colonized with ESBL strain  - Off loading to prevent pressure sores   - wound care following     #seizure disorder, cont meds   #Down syndrome  #Nonverbal bedbound  # PEG tube   - c/w home meds    Overall prognosis poor,     #Progress Note Handoff    Pending : completion of IV abx. tx. tomorrow , than observe w/o abx. tx, if remains stable  than d/c to group home  Disposition: group home   code status: DNR, DNI     Total time spent to complete patient's bedside assessment, review medical chart, discuss medical plan of care with covering medical team was more than 35 minutes with >50% of time spent face to face with patient, discussion with patient/family and/or coordination of care .

## 2024-05-26 LAB
ALBUMIN SERPL ELPH-MCNC: 2.7 G/DL — LOW (ref 3.5–5.2)
ALP SERPL-CCNC: 90 U/L — SIGNIFICANT CHANGE UP (ref 30–115)
ALT FLD-CCNC: 8 U/L — SIGNIFICANT CHANGE UP (ref 0–41)
ANION GAP SERPL CALC-SCNC: 10 MMOL/L — SIGNIFICANT CHANGE UP (ref 7–14)
AST SERPL-CCNC: 10 U/L — SIGNIFICANT CHANGE UP (ref 0–41)
BASOPHILS # BLD AUTO: 0.04 K/UL — SIGNIFICANT CHANGE UP (ref 0–0.2)
BASOPHILS NFR BLD AUTO: 0.4 % — SIGNIFICANT CHANGE UP (ref 0–1)
BILIRUB SERPL-MCNC: <0.2 MG/DL — SIGNIFICANT CHANGE UP (ref 0.2–1.2)
BUN SERPL-MCNC: 12 MG/DL — SIGNIFICANT CHANGE UP (ref 10–20)
CALCIUM SERPL-MCNC: 8.4 MG/DL — SIGNIFICANT CHANGE UP (ref 8.4–10.4)
CHLORIDE SERPL-SCNC: 96 MMOL/L — LOW (ref 98–110)
CO2 SERPL-SCNC: 29 MMOL/L — SIGNIFICANT CHANGE UP (ref 17–32)
CREAT SERPL-MCNC: <0.5 MG/DL — LOW (ref 0.7–1.5)
EGFR: 115 ML/MIN/1.73M2 — SIGNIFICANT CHANGE UP
EOSINOPHIL # BLD AUTO: 0.37 K/UL — SIGNIFICANT CHANGE UP (ref 0–0.7)
EOSINOPHIL NFR BLD AUTO: 3.5 % — SIGNIFICANT CHANGE UP (ref 0–8)
GLUCOSE BLDC GLUCOMTR-MCNC: 109 MG/DL — HIGH (ref 70–99)
GLUCOSE BLDC GLUCOMTR-MCNC: 116 MG/DL — HIGH (ref 70–99)
GLUCOSE BLDC GLUCOMTR-MCNC: 144 MG/DL — HIGH (ref 70–99)
GLUCOSE SERPL-MCNC: 124 MG/DL — HIGH (ref 70–99)
HCT VFR BLD CALC: 28.4 % — LOW (ref 37–47)
HGB BLD-MCNC: 8.9 G/DL — LOW (ref 12–16)
IMM GRANULOCYTES NFR BLD AUTO: 0.5 % — HIGH (ref 0.1–0.3)
LYMPHOCYTES # BLD AUTO: 1.57 K/UL — SIGNIFICANT CHANGE UP (ref 1.2–3.4)
LYMPHOCYTES # BLD AUTO: 15 % — LOW (ref 20.5–51.1)
MAGNESIUM SERPL-MCNC: 2.3 MG/DL — SIGNIFICANT CHANGE UP (ref 1.8–2.4)
MCHC RBC-ENTMCNC: 29.6 PG — SIGNIFICANT CHANGE UP (ref 27–31)
MCHC RBC-ENTMCNC: 31.3 G/DL — LOW (ref 32–37)
MCV RBC AUTO: 94.4 FL — SIGNIFICANT CHANGE UP (ref 81–99)
MONOCYTES # BLD AUTO: 0.39 K/UL — SIGNIFICANT CHANGE UP (ref 0.1–0.6)
MONOCYTES NFR BLD AUTO: 3.7 % — SIGNIFICANT CHANGE UP (ref 1.7–9.3)
NEUTROPHILS # BLD AUTO: 8.04 K/UL — HIGH (ref 1.4–6.5)
NEUTROPHILS NFR BLD AUTO: 76.9 % — HIGH (ref 42.2–75.2)
NRBC # BLD: 0 /100 WBCS — SIGNIFICANT CHANGE UP (ref 0–0)
PLATELET # BLD AUTO: 394 K/UL — SIGNIFICANT CHANGE UP (ref 130–400)
PMV BLD: 10.1 FL — SIGNIFICANT CHANGE UP (ref 7.4–10.4)
POTASSIUM SERPL-MCNC: 4.2 MMOL/L — SIGNIFICANT CHANGE UP (ref 3.5–5)
POTASSIUM SERPL-SCNC: 4.2 MMOL/L — SIGNIFICANT CHANGE UP (ref 3.5–5)
PROT SERPL-MCNC: 6 G/DL — SIGNIFICANT CHANGE UP (ref 6–8)
RBC # BLD: 3.01 M/UL — LOW (ref 4.2–5.4)
RBC # FLD: 20 % — HIGH (ref 11.5–14.5)
SODIUM SERPL-SCNC: 135 MMOL/L — SIGNIFICANT CHANGE UP (ref 135–146)
WBC # BLD: 10.46 K/UL — SIGNIFICANT CHANGE UP (ref 4.8–10.8)
WBC # FLD AUTO: 10.46 K/UL — SIGNIFICANT CHANGE UP (ref 4.8–10.8)

## 2024-05-26 PROCEDURE — 99232 SBSQ HOSP IP/OBS MODERATE 35: CPT

## 2024-05-26 RX ADMIN — LEVETIRACETAM 750 MILLIGRAM(S): 250 TABLET, FILM COATED ORAL at 06:21

## 2024-05-26 RX ADMIN — Medication 3 MILLILITER(S): at 20:53

## 2024-05-26 RX ADMIN — Medication 650 MILLIGRAM(S): at 18:09

## 2024-05-26 RX ADMIN — GABAPENTIN 300 MILLIGRAM(S): 400 CAPSULE ORAL at 17:53

## 2024-05-26 RX ADMIN — Medication 1 DROP(S): at 17:53

## 2024-05-26 RX ADMIN — GABAPENTIN 300 MILLIGRAM(S): 400 CAPSULE ORAL at 06:21

## 2024-05-26 RX ADMIN — Medication 3 MILLILITER(S): at 08:33

## 2024-05-26 RX ADMIN — FAMOTIDINE 20 MILLIGRAM(S): 10 INJECTION INTRAVENOUS at 17:53

## 2024-05-26 RX ADMIN — Medication 1 DROP(S): at 06:22

## 2024-05-26 RX ADMIN — SCOPALAMINE 1 PATCH: 1 PATCH, EXTENDED RELEASE TRANSDERMAL at 18:09

## 2024-05-26 RX ADMIN — SCOPALAMINE 1 PATCH: 1 PATCH, EXTENDED RELEASE TRANSDERMAL at 07:08

## 2024-05-26 RX ADMIN — RALOXIFENE HYDROCHLORIDE 60 MILLIGRAM(S): 60 TABLET, COATED ORAL at 12:28

## 2024-05-26 RX ADMIN — Medication 1 APPLICATION(S): at 17:54

## 2024-05-26 RX ADMIN — Medication 1 TABLET(S): at 12:29

## 2024-05-26 RX ADMIN — Medication 1 APPLICATION(S): at 06:22

## 2024-05-26 RX ADMIN — MIDODRINE HYDROCHLORIDE 10 MILLIGRAM(S): 2.5 TABLET ORAL at 12:28

## 2024-05-26 RX ADMIN — ENOXAPARIN SODIUM 30 MILLIGRAM(S): 100 INJECTION SUBCUTANEOUS at 12:28

## 2024-05-26 RX ADMIN — FAMOTIDINE 20 MILLIGRAM(S): 10 INJECTION INTRAVENOUS at 06:21

## 2024-05-26 RX ADMIN — LEVETIRACETAM 750 MILLIGRAM(S): 250 TABLET, FILM COATED ORAL at 17:53

## 2024-05-26 RX ADMIN — DAPTOMYCIN 118 MILLIGRAM(S): 500 INJECTION, POWDER, LYOPHILIZED, FOR SOLUTION INTRAVENOUS at 09:17

## 2024-05-26 RX ADMIN — MIDODRINE HYDROCHLORIDE 10 MILLIGRAM(S): 2.5 TABLET ORAL at 21:51

## 2024-05-26 RX ADMIN — Medication 3 MILLILITER(S): at 15:17

## 2024-05-26 RX ADMIN — Medication 650 MILLIGRAM(S): at 17:52

## 2024-05-26 NOTE — PROGRESS NOTE ADULT - SUBJECTIVE AND OBJECTIVE BOX
24H events:    Patient is a 58y old Female who presents with a chief complaint of Pneumonia (25 May 2024 12:34)    Primary diagnosis of Acute sepsis      Today is 41d of hospitalization. This morning patient was seen and examined at bedside, resting comfortably in bed.  No acute or major events overnight.     Code Status: DNR/DNI      PAST MEDICAL & SURGICAL HISTORY  Down syndrome    Osteoporosis    Mild anemia    Neuropathy    S/P debridement  of R hip on 3/2/21      SOCIAL HISTORY:  Social History:      ALLERGIES:  chlorhexidine containing compounds (Rash (Mild to Mod))    MEDICATIONS:  STANDING MEDICATIONS  albuterol/ipratropium for Nebulization 3 milliLiter(s) Nebulizer every 6 hours  AQUAPHOR (petrolatum Ointment) 1 Application(s) Topical two times a day  artificial  tears Solution 1 Drop(s) Both EYES two times a day  calcium carbonate   1250 mG (OsCal) 1 Tablet(s) Oral daily  DAPTOmycin IVPB 450 milliGRAM(s) IV Intermittent every 24 hours  enoxaparin Injectable 30 milliGRAM(s) SubCutaneous every 24 hours  famotidine    Tablet 20 milliGRAM(s) Oral two times a day  gabapentin Solution 300 milliGRAM(s) Oral two times a day  lactated ringers. 500 milliLiter(s) IV Continuous <Continuous>  levETIRAcetam  Solution 750 milliGRAM(s) Oral two times a day  midodrine 10 milliGRAM(s) Oral every 8 hours  raloxifene 60 milliGRAM(s) Oral daily  scopolamine 1 mG/72 Hr(s) Patch 1 Patch Transdermal every 72 hours  senna 2 Tablet(s) Oral at bedtime    PRN MEDICATIONS  acetaminophen     Tablet .. 650 milliGRAM(s) Oral every 6 hours PRN  melatonin 3 milliGRAM(s) Oral at bedtime PRN  ondansetron Injectable 4 milliGRAM(s) IV Push every 8 hours PRN    VITALS:   T(F): 98.6  HR: 111  BP: 108/69  RR: 18  SpO2: 95%    PHYSICAL EXAM:  GENERAL: non verbal  ( x) NAD, lying in bed comfortably     (  ) obtunded     (  ) lethargic     (  ) somnolent    HEAD:   ( x) Atraumatic     (  ) hematoma     (  ) laceration (specify location:       )     NECK:  (x) Supple     (  ) neck stiffness     (  ) nuchal rigidity     (  )  no JVD     (  ) JVD present ( -- cm)    HEART:  Rate -->     (x) normal rate     (  ) bradycardic     (  ) tachycardic  Rhythm -->     (x) regular     (  ) regularly irregular     (  ) irregularly irregular  Murmurs -->     (x) normal s1s2     (  ) systolic murmur     (  ) diastolic murmur     (  ) continuous murmur      (  ) S3 present     (  ) S4 present    LUNGS:   ( x)Unlabored respirations     (  ) tachypnea  ( x) B/L air entry     (  ) decreased breath sounds in:  (location     )    (  ) no adventitious sound     (  ) crackles     (  ) wheezing      (  ) rhonchi      (specify location:       )  (  ) chest wall tenderness (specify location:       )    ABDOMEN: +PEG  ( x) Soft     (  ) tense   |   (X  ) nondistended     (  ) distended   |   ( X ) +BS     (  ) hypoactive bowel sounds     (  ) hyperactive bowel sounds  ( x) nontender     (  ) RUQ tenderness     (  ) RLQ tenderness     (  ) LLQ tenderness     (  ) epigastric tenderness     (  ) diffuse tenderness  (  ) Splenomegaly      (  ) Hepatomegaly      (  ) Jaundice     (  ) ecchymosis     EXTREMITIES:  ( x) Normal     (  ) Rash     (  ) ecchymosis     (  ) varicose veins      (  ) pitting edema     (  ) non-pitting edema   (  ) ulceration     (  ) gangrene:     (location:     )    NERVOUS SYSTEM:  nonverbal, contracted  (  ) A&Ox3     (  ) confused     (  ) lethargic  CN II-XII:     (  ) Intact     (  ) deficits found     (Specify:     )   Upper extremities:     (  ) no sensorimotor deficits     (  ) weakness     (  ) loss of proprioception/vibration     (  ) loss of touch/temperature (specify:    )  Lower extremities:     (  ) no sensorimotor deficits     (  ) weakness     (  ) loss of proprioception/vibration     (  ) loss of touch/temperature (specify:    )    SKIN:   ( X ) No rashes or lesions     (  ) maculopapular rash     (  ) pustules     (  ) vesicles     (  ) ulcer     (  ) ecchymosis     (specify location:     )      LABS:                        9.0    14.75 )-----------( 354      ( 25 May 2024 07:35 )             29.5     05-25    137  |  99  |  14  ----------------------------<  136<H>  4.7   |  27  |  0.5<L>    Ca    8.5      25 May 2024 07:35  Mg     2.2     05-25    TPro  5.8<L>  /  Alb  2.7<L>  /  TBili  <0.2  /  DBili  x   /  AST  14  /  ALT  8   /  AlkPhos  87  05-25      Urinalysis Basic - ( 25 May 2024 07:35 )    Color: x / Appearance: x / SG: x / pH: x  Gluc: 136 mg/dL / Ketone: x  / Bili: x / Urobili: x   Blood: x / Protein: x / Nitrite: x   Leuk Esterase: x / RBC: x / WBC x   Sq Epi: x / Non Sq Epi: x / Bacteria: x            Culture - Blood (collected 23 May 2024 11:56)  Source: .Blood None  Preliminary Report (25 May 2024 22:02):    No growth at 48 Hours          RADIOLOGY:                 24H events:    Patient is a 58y old Female who presents with a chief complaint of Pneumonia (25 May 2024 12:34)    Primary diagnosis of Acute sepsis      Today is 41d of hospitalization. No acute or major events overnight.     Code Status: DNR/DNI      PAST MEDICAL & SURGICAL HISTORY  Down syndrome    Osteoporosis    Mild anemia    Neuropathy    S/P debridement  of R hip on 3/2/21      SOCIAL HISTORY:  Social History:      ALLERGIES:  chlorhexidine containing compounds (Rash (Mild to Mod))    MEDICATIONS:  STANDING MEDICATIONS  albuterol/ipratropium for Nebulization 3 milliLiter(s) Nebulizer every 6 hours  AQUAPHOR (petrolatum Ointment) 1 Application(s) Topical two times a day  artificial  tears Solution 1 Drop(s) Both EYES two times a day  calcium carbonate   1250 mG (OsCal) 1 Tablet(s) Oral daily  DAPTOmycin IVPB 450 milliGRAM(s) IV Intermittent every 24 hours  enoxaparin Injectable 30 milliGRAM(s) SubCutaneous every 24 hours  famotidine    Tablet 20 milliGRAM(s) Oral two times a day  gabapentin Solution 300 milliGRAM(s) Oral two times a day  lactated ringers. 500 milliLiter(s) IV Continuous <Continuous>  levETIRAcetam  Solution 750 milliGRAM(s) Oral two times a day  midodrine 10 milliGRAM(s) Oral every 8 hours  raloxifene 60 milliGRAM(s) Oral daily  scopolamine 1 mG/72 Hr(s) Patch 1 Patch Transdermal every 72 hours  senna 2 Tablet(s) Oral at bedtime    PRN MEDICATIONS  acetaminophen     Tablet .. 650 milliGRAM(s) Oral every 6 hours PRN  melatonin 3 milliGRAM(s) Oral at bedtime PRN  ondansetron Injectable 4 milliGRAM(s) IV Push every 8 hours PRN    VITALS:   T(F): 98.6  HR: 111  BP: 108/69  RR: 18  SpO2: 95%    PHYSICAL EXAM:  GENERAL: non verbal  ( x) NAD, lying in bed comfortably     (  ) obtunded     (  ) lethargic     (  ) somnolent    HEAD:   ( x) Atraumatic     (  ) hematoma     (  ) laceration (specify location:       )     NECK:  ( ) Supple     (  ) neck stiffness     (  ) nuchal rigidity     (  )  no JVD     (  ) JVD present ( -- cm)    HEART:  Rate -->     (x) normal rate     (  ) bradycardic     (  ) tachycardic  Rhythm -->     (x) regular     (  ) regularly irregular     (  ) irregularly irregular  Murmurs -->     ( ) normal s1s2     (  ) systolic murmur     (  ) diastolic murmur     (  ) continuous murmur      (  ) S3 present     (  ) S4 present    LUNGS:   ( x)Unlabored respirations     (  ) tachypnea  (  ) B/L air entry     (  ) decreased breath sounds in:  (location     )    (  ) no adventitious sound     (  ) crackles     (  ) wheezing      (  ) rhonchi      (specify location:       )  (  ) chest wall tenderness (specify location:       )    ABDOMEN: +PEG  (  ) Soft     (  ) tense   |   (   ) nondistended     (  ) distended   |   (   ) +BS     (  ) hypoactive bowel sounds     (  ) hyperactive bowel sounds  (  ) nontender     (  ) RUQ tenderness     (  ) RLQ tenderness     (  ) LLQ tenderness     (  ) epigastric tenderness     (  ) diffuse tenderness  (  ) Splenomegaly      (  ) Hepatomegaly      (  ) Jaundice     (  ) ecchymosis     EXTREMITIES:  (  ) Normal     (  ) Rash     (  ) ecchymosis     (  ) varicose veins      (  ) pitting edema     (  ) non-pitting edema   (  ) ulceration     (  ) gangrene:     (location:     )    NERVOUS SYSTEM:  nonverbal  (  ) A&Ox3     (  ) confused     (  ) lethargic  CN II-XII:     (  ) Intact     (  ) deficits found     (Specify:     )   Upper extremities:     (  ) no sensorimotor deficits     (  ) weakness     (  ) loss of proprioception/vibration     (  ) loss of touch/temperature (specify:    )  Lower extremities:     (  ) no sensorimotor deficits     (  ) weakness     (  ) loss of proprioception/vibration     (  ) loss of touch/temperature (specify:    )    SKIN:   (   ) No rashes or lesions     (  ) maculopapular rash     (  ) pustules     (  ) vesicles     (  ) ulcer     (  ) ecchymosis     (specify location:     )      LABS:                        9.0    14.75 )-----------( 354      ( 25 May 2024 07:35 )             29.5     05-25    137  |  99  |  14  ----------------------------<  136<H>  4.7   |  27  |  0.5<L>    Ca    8.5      25 May 2024 07:35  Mg     2.2     05-25    TPro  5.8<L>  /  Alb  2.7<L>  /  TBili  <0.2  /  DBili  x   /  AST  14  /  ALT  8   /  AlkPhos  87  05-25      Urinalysis Basic - ( 25 May 2024 07:35 )    Color: x / Appearance: x / SG: x / pH: x  Gluc: 136 mg/dL / Ketone: x  / Bili: x / Urobili: x   Blood: x / Protein: x / Nitrite: x   Leuk Esterase: x / RBC: x / WBC x   Sq Epi: x / Non Sq Epi: x / Bacteria: x            Culture - Blood (collected 23 May 2024 11:56)  Source: .Blood None  Preliminary Report (25 May 2024 22:02):    No growth at 48 Hours          RADIOLOGY:

## 2024-05-26 NOTE — PROGRESS NOTE ADULT - SUBJECTIVE AND OBJECTIVE BOX
TEA ECHAVARRIA  [facility] 3A 025 A ([facility])        Patient was evaluated and examined  by bedside, remains afebrile, tolerating peg feedings, no active events over night time as per covering nurse report         REVIEW OF SYSTEMS: unable to obtain, patient is non-verbal         T(C): , Max: 37 (05-25-24 @ 22:35)  HR: 108 (05-26-24 @ 05:00)  BP: 120/69 (05-26-24 @ 05:00)  RR: 18 (05-26-24 @ 05:00)  SpO2: 100% (05-26-24 @ 05:00)  CAPILLARY BLOOD GLUCOSE      POCT Blood Glucose.: 144 mg/dL (26 May 2024 06:56)  POCT Blood Glucose.: 109 mg/dL (26 May 2024 00:18)  POCT Blood Glucose.: 106 mg/dL (25 May 2024 16:53)      PHYSICAL EXAM:  General: NAD, Awake, patient is laying comfortably in bed  HEENT: AT, NC, Supple, NO JVD, NO CB  Lungs: mild decreased breath sounds  B/L, no wheezing, mild b/l  rhonchi present  CVS: normal S1, S2, RRR, NO M/G/R  Abdomen: soft, bowel sounds present, non-tender, non-distended, peg present  Extremities: contracted extremities, no edema, no clubbing, no cyanosis, positive peripheral pulses b/l  Neuro: chronic , non-verbal, bed-confinement status  Skin: no rash, no ecchymosis      LAB  CBC  Date: 05-26-24 @ 07:13  Mean cell Ixywqrbkbm42.6  Mean cell Hemoglobin Conc31.3  Mean cell Volum 94.4  Platelet count-Automate 394  RBC Count 3.01  Red Cell Distrib Width20.0  WBC Count10.46  % Albumin, Urine--  Hematocrit 28.4  Hemoglobin 8.9  CBC  Date: 05-25-24 @ 07:35  Mean cell Cwljniyzkv56.4  Mean cell Hemoglobin Conc30.5  Mean cell Volum 96.4  Platelet count-Automate 354  RBC Count 3.06  Red Cell Distrib Width20.5  WBC Count14.75  % Albumin, Urine--  Hematocrit 29.5  Hemoglobin 9.0  CBC  Date: 05-24-24 @ 06:55  Mean cell Ndgigzzzsx48.4  Mean cell Hemoglobin Conc30.6  Mean cell Volum 96.3  Platelet count-Automate 452  RBC Count 3.26  Red Cell Distrib Width20.7  WBC Count13.39  % Albumin, Urine--  Hematocrit 31.4  Hemoglobin 9.6  CBC  Date: 05-23-24 @ 06:50  Mean cell Edifhfrcft93.8  Mean cell Hemoglobin Conc31.5  Mean cell Volum 94.8  Platelet count-Automate 455  RBC Count 3.05  Red Cell Distrib Width20.9  WBC Count7.98  % Albumin, Urine--  Hematocrit 28.9  Hemoglobin 9.1  CBC  Date: 05-22-24 @ 06:36  Mean cell Cieqkjziby95.6  Mean cell Hemoglobin Conc30.9  Mean cell Volum 95.7  Platelet count-Automate 484  RBC Count 3.28  Red Cell Distrib Width20.8  WBC Count13.41  % Albumin, Urine--  Hematocrit 31.4  Hemoglobin 9.7  CBC  Date: 05-21-24 @ 07:06  Mean cell Vbgubtdzhv58.4  Mean cell Hemoglobin Conc30.5  Mean cell Volum 96.3  Platelet count-Automate 496  RBC Count 3.20  Red Cell Distrib Width20.7  WBC Count9.63  % Albumin, Urine--  Hematocrit 30.8  Hemoglobin 9.4  CBC  Date: 05-20-24 @ 06:55  Mean cell Zpamcmivmz52.9  Mean cell Hemoglobin Conc30.1  Mean cell Volum 96.2  Platelet count-Automate 483  RBC Count 3.18  Red Cell Distrib Width20.7  WBC Count7.64  % Albumin, Urine--  Hematocrit 30.6  Hemoglobin 9.2    BMP  05-26-24 @ 07:13  Blood Gas Arterial-Calcium,Ionized--  Blood Urea Nitrogen, Serum 12 mg/dL [10 - 20]  Carbon Dioxide, Serum29 mmol/L [17 - 32]  Chloride, Serum96 mmol/L<L> [98 - 110]  Creatinie, Serum<0.5 mg/dL<L> [0.7 - 1.5]  Glucose, Wlocp547 mg/dL<H> [70 - 99]  Potassium, Serum4.2 mmol/L [3.5 - 5.0]  Sodium, Serum 135 mmol/L [135 - 146]  BMP  05-25-24 @ 07:35  Blood Gas Arterial-Calcium,Ionized--  Blood Urea Nitrogen, Serum 14 mg/dL [10 - 20]  Carbon Dioxide, Serum27 mmol/L [17 - 32]  Chloride, Serum99 mmol/L [98 - 110]  Creatinie, Serum0.5 mg/dL<L> [0.7 - 1.5]  Glucose, Nickj023 mg/dL<H> [70 - 99]  Potassium, Serum4.7 mmol/L [3.5 - 5.0]  Sodium, Serum 137 mmol/L [135 - 146]  [facility]  05-24-24 @ 06:55  Blood Gas Arterial-Calcium,Ionized--  Blood Urea Nitrogen, Serum 13 mg/dL [10 - 20]  Carbon Dioxide, Serum27 mmol/L [17 - 32]  Chloride, Serum98 mmol/L [98 - 110]  Creatinie, Serum0.5 mg/dL<L> [0.7 - 1.5]  Glucose, Serum89 mg/dL [70 - 99]  Potassium, Serum5.1 mmol/L<H> [3.5 - 5.0] [Slighty Hemolyzed use with Caution]  Sodium, Serum 137 mmol/L [135 - 146]  [facility]  05-23-24 @ 06:50  Blood Gas Arterial-Calcium,Ionized--  Blood Urea Nitrogen, Serum 14 mg/dL [10 - 20]  Carbon Dioxide, Serum28 mmol/L [17 - 32]  Chloride, Serum99 mmol/L [98 - 110]  Creatinie, Serum<0.5 mg/dL<L> [0.7 - 1.5]  Glucose, Pvpec039 mg/dL<H> [70 - 99]  Potassium, Serum4.4 mmol/L [3.5 - 5.0]  Sodium, Serum 136 mmol/L [135 - 146]  [facility]  05-22-24 @ 06:36  Blood Gas Arterial-Calcium,Ionized--  Blood Urea Nitrogen, Serum 14 mg/dL [10 - 20]  Carbon Dioxide, Serum27 mmol/L [17 - 32]  Chloride, Serum99 mmol/L [98 - 110]  Creatinie, Serum0.5 mg/dL<L> [0.7 - 1.5]  Glucose, Serum99 mg/dL [70 - 99]  Potassium, Serum4.5 mmol/L [3.5 - 5.0]  Sodium, Serum 137 mmol/L [135 - 146]              Microbiology:    Culture - Blood (collected 05-23-24 @ 11:56)  Source: .Blood None  Preliminary Report (05-25-24 @ 22:02):    No growth at 48 Hours    Culture - Blood (collected 05-22-24 @ 06:36)  Source: .Blood None  Preliminary Report (05-25-24 @ 17:01):    No growth at 72 Hours    Culture - Urine (collected 05-21-24 @ 14:20)  Source: Clean Catch Clean Catch (Midstream)  Final Report (05-22-24 @ 22:35):    <10,000 CFU/mL Normal Urogenital Mili    Culture - Blood (collected 05-18-24 @ 22:29)  Source: .Blood Blood  Gram Stain (05-20-24 @ 12:21):    Growth in aerobic bottle: Gram Positive Cocci in Clusters    Growth in anaerobic bottle: Gram Positive Cocci in Clusters  Final Report (05-21-24 @ 13:20):    Growth in aerobic and anaerobic bottles: Staphylococcus epidermidis    Isolation of Coagulase negative Staphylococcus from single blood culture    sets may represent    contamination. Contact the Microbiology Department at [phone] if    susceptibilitytesting is    clinically indicated.    Culture - Blood (collected 05-18-24 @ 22:29)  Source: .Blood Blood  Gram Stain (05-19-24 @ 17:12):    Growth in aerobic bottle: Gram Negative Rods and Gram positive cocci in    pairs  Final Report (05-23-24 @ 08:22):    Growth in aerobic bottle: Proteus mirabilis ESBL    Growth in aerobic bottle: Enterococcus faecalis (vancomycin resistant)    Growth in aerobic bottle: Enterococcus faecium (vancomycin resistant)    Direct identification is available within approximately3-5    hours either by Blood Panel Multiplexed PCR or Direct    MALDI-TOF. Details: [email or web address]    Vancomycin resistance gene detected    When multiple species' of Enterococcus are present, association of a    resistance gene to a specific organism cannot be determined.  Organism: Blood Culture PCR  Proteus mirabilis ESBL  Enterococcus faecalis (vancomycin resistant)  Enterococcus faecium (vancomycin resistant) (05-23-24 @ 08:22)  Organism: Enterococcus faecium (vancomycin resistant) (05-23-24 @ 08:22)      Method Type: ZANE      -  Ampicillin: R >8 Predicts results to ampicillin/sulbactam, amoxacillin-clavulanate and  piperacillin-tazobactam.      -  Daptomycin: SDD 2 The breakpoint for SDD (susceptible dose dependent)is based on a dosage regimen of 8-12 mg/kg administered every 24 h in adults and is intended for serious infections due to E. faecium. Consultation with an infectious diseases specialist is recommended.      -  Linezolid: S 2      -  Vancomycin: R >16      -  Gentamicin synergy: S <=500      -  Streptomycin synergy: S <=1000  Organism: Enterococcus faecalis (vancomycin resistant) (05-23-24 @ 08:22)      Method Type: ZANE      -  Ampicillin: S <=2 Predicts results to ampicillin/sulbactam, amoxacillin-clavulanate and  piperacillin-tazobactam.      -  Daptomycin: S 1      -  Linezolid: S 1      -  Vancomycin: R >16      -  Gentamicin synergy: S <=500      -  Streptomycin synergy: R >1000  Organism: Proteus mirabilis ESBL (05-23-24 @ 08:22)      Method Type: ZANE      -  Ampicillin: R >16 These ampicillin results predict results for amoxicillin      -  Ampicillin/Sulbactam: R <=4/2      -  Aztreonam: R <=4      -  Cefazolin: R >16      -  Cefepime: R 8      -  Ceftriaxone: R 32      -  Ciprofloxacin: R >2      -  Ertapenem: S <=0.5      -  Gentamicin: S <=2      -  Levofloxacin: R >4      -  Meropenem: S <=1      -  Piperacillin/Tazobactam: R <=8      -  Tobramycin: S <=2      -  Trimethoprim/Sulfamethoxazole: R >2/38  Organism: Blood Culture PCR (05-23-24 @ 08:22)      Method Type: PCR      -  Proteus species: Detec      -  Enterococcus faecalis: Detec      -  Enterococcus faecium: Detec      -  Staphylococcus epidermidis, Methicillin resistant: Detec      -  ESBL: Detec      -  CTX-M Resistance Marker: Detec    Culture - Blood (collected 05-06-24 @ 04:20)  Source: .Blood Blood-Peripheral  Final Report (05-11-24 @ 14:01):    No growth at 5 days    Culture - Blood (collected 05-05-24 @ 17:54)  Source: .Blood Blood  Final Report (05-11-24 @ 01:00):    No growth at 5 days    Culture - Blood (collected 05-04-24 @ 07:07)  Source: .Blood None  Final Report (05-09-24 @ 18:00):    No growth at 5 days    Culture - Blood (collected 05-03-24 @ 18:00)  Source: .Blood Blood  Final Report (05-08-24 @ 23:10):    No growth at 5 days    Culture - Blood (collected 04-28-24 @ 07:23)  Source: .Blood None  Final Report (05-03-24 @ 17:00):    No growth at 5 days        Medications:  acetaminophen     Tablet .. 650 milliGRAM(s) Oral every 6 hours PRN  albuterol/ipratropium for Nebulization 3 milliLiter(s) Nebulizer every 6 hours  AQUAPHOR (petrolatum Ointment) 1 Application(s) Topical two times a day  artificial  tears Solution 1 Drop(s) Both EYES two times a day  calcium carbonate   1250 mG (OsCal) 1 Tablet(s) Oral daily  enoxaparin Injectable 30 milliGRAM(s) SubCutaneous every 24 hours  famotidine    Tablet 20 milliGRAM(s) Oral two times a day  gabapentin Solution 300 milliGRAM(s) Oral two times a day  lactated ringers. 500 milliLiter(s) IV Continuous <Continuous>  levETIRAcetam  Solution 750 milliGRAM(s) Oral two times a day  melatonin 3 milliGRAM(s) Oral at bedtime PRN  midodrine 10 milliGRAM(s) Oral every 8 hours  ondansetron Injectable 4 milliGRAM(s) IV Push every 8 hours PRN  raloxifene 60 milliGRAM(s) Oral daily  scopolamine 1 mG/72 Hr(s) Patch 1 Patch Transdermal every 72 hours  senna 2 Tablet(s) Oral at bedtime        Assessment and Plan:  Patient is a 59y/o Female  w/ pmhx of Down's syndrome, cerebral palsy, seizure disorder presents from [facility] for respiratory distress. Admitted to SDU for severe sepsis and acute hypoxemic respiratory failure likely secondary to recurrent CAP.     # Acute hypoxemic respiratory failure   #Severe sepsis with septic shock poa,    VRE bacteremia,    Blood culture grew  VRE ,repeated blood cxs neg  - s/p TTE- no endocarditis , IV Dapto , IV Alicia x 1 week as per ID rec. , to complete on 5/26    #possible recurrent pneumonia /suspected aspiration pna ( despite having peg )   #chronic hypotension   - covid/RSV, MSRA, negative; Bl cx with contaminant   - Hypotension C/w Midodrine   - target MAP 60 ( Patient always has BP on the softer side )   - blood cx 4/15: staph capitis,  blood cxs 4/15 and 4/17: NGTD  - CT chest angio w/ contrast 4/24: bilateral opacities   -  s/p  Meropenem  1g q8  x mutiple courses.        # rash, probably contact dermatitis, improving   hold chlorhexidine wash, added topical care , monitor     #Episodes of Vomiting, complicated by Aspiration   - recurrent aspiration risk is high for coexisting hiatal hernia   - On PEG feed  , now on lower volume, tolerating   - gastric emptying study, neg     # B/l feet ulcers  - colonized with ESBL strain  - Off loading to prevent pressure sores   - wound care following     #seizure disorder, cont meds   #Down syndrome  #Nonverbal bedbound  # PEG tube   - c/w home meds    Overall prognosis poor,     #Progress Note Handoff    Pending : completion of IV abx. tx. today  , than observe w/o abx. tx, if remains stable  than d/c to group home in 24 hours   Disposition: group home   code status: DNR, DNI     Total time spent to complete patient's bedside assessment, review medical chart, discuss medical plan of care with covering medical team was more than 35 minutes with >50% of time spent face to face with patient, discussion with patient/family and/or coordination of care .

## 2024-05-26 NOTE — PROGRESS NOTE ADULT - ASSESSMENT
Patient is a 59y/o Female  w/ pmhx of Down's syndrome, cerebral palsy, seizure disorder presents from [facility] for respiratory distress. Admitted to SDU for severe sepsis and acute hypoxemic respiratory failure likely secondary to recurrent CAP requiring pressors, now off.     # Acute hypoxemic respiratory failure   #Severe sepsis with septic shock poa  - Blood Cx 5/18 - GPC in clusters, E faecalis, E faecium, Proteus mirabilis, ESBL Gene dtected   - repeated blood cxs neg  - ID consult appreciated  - s/p TTE- no endocarditis , IV Dapto , IV Alicia x 1 week last day 5/26  - Monitor off Abx when course above complete  - Trend WBC    #possible recurrent pneumonia /suspected aspiration pna ( despite having peg )   #chronic hypotension   - Covid/RSV/MSRA negative  - C/w Midodrine 10mg TID  - target MAP 60 ( Patient always has BP on the softer side )      # rash, probably contact dermatitis, improving   - hold chlorhexidine wash,   - c/w added topical care , monitor     #Episodes of Vomiting, complicated by Aspiration   - recurrent aspiration risk is high for coexisting hiatal hernia   - On PEG feed on lower volume  - gastric emptying study neg     # B/l feet ulcers  - colonized with ESBL strain  - Off loading to prevent pressure sores   - wound care following     #seizure disorder, cont meds   #Down syndrome  #Nonverbal bedbound  # PEG tube   - c/w home meds    Diet: Tube feeds  Activity: AAT  DVT Prophylaxis: lovenox  GI Prophylaxis: Famotidine  Code Status: DNR/DNI  Disposition: Group home pending ABx completion

## 2024-05-27 LAB
BASOPHILS # BLD AUTO: 0.06 K/UL — SIGNIFICANT CHANGE UP (ref 0–0.2)
BASOPHILS NFR BLD AUTO: 0.8 % — SIGNIFICANT CHANGE UP (ref 0–1)
CULTURE RESULTS: SIGNIFICANT CHANGE UP
EOSINOPHIL # BLD AUTO: 0.38 K/UL — SIGNIFICANT CHANGE UP (ref 0–0.7)
EOSINOPHIL NFR BLD AUTO: 4.8 % — SIGNIFICANT CHANGE UP (ref 0–8)
GLUCOSE BLDC GLUCOMTR-MCNC: 108 MG/DL — HIGH (ref 70–99)
GLUCOSE BLDC GLUCOMTR-MCNC: 127 MG/DL — HIGH (ref 70–99)
GLUCOSE BLDC GLUCOMTR-MCNC: 92 MG/DL — SIGNIFICANT CHANGE UP (ref 70–99)
GLUCOSE BLDC GLUCOMTR-MCNC: 99 MG/DL — SIGNIFICANT CHANGE UP (ref 70–99)
HCT VFR BLD CALC: 29.3 % — LOW (ref 37–47)
HGB BLD-MCNC: 9.3 G/DL — LOW (ref 12–16)
IMM GRANULOCYTES NFR BLD AUTO: 0.4 % — HIGH (ref 0.1–0.3)
LYMPHOCYTES # BLD AUTO: 1.16 K/UL — LOW (ref 1.2–3.4)
LYMPHOCYTES # BLD AUTO: 14.6 % — LOW (ref 20.5–51.1)
MCHC RBC-ENTMCNC: 29.8 PG — SIGNIFICANT CHANGE UP (ref 27–31)
MCHC RBC-ENTMCNC: 31.7 G/DL — LOW (ref 32–37)
MCV RBC AUTO: 93.9 FL — SIGNIFICANT CHANGE UP (ref 81–99)
MONOCYTES # BLD AUTO: 0.39 K/UL — SIGNIFICANT CHANGE UP (ref 0.1–0.6)
MONOCYTES NFR BLD AUTO: 4.9 % — SIGNIFICANT CHANGE UP (ref 1.7–9.3)
NEUTROPHILS # BLD AUTO: 5.91 K/UL — SIGNIFICANT CHANGE UP (ref 1.4–6.5)
NEUTROPHILS NFR BLD AUTO: 74.5 % — SIGNIFICANT CHANGE UP (ref 42.2–75.2)
NRBC # BLD: 0 /100 WBCS — SIGNIFICANT CHANGE UP (ref 0–0)
PLATELET # BLD AUTO: 395 K/UL — SIGNIFICANT CHANGE UP (ref 130–400)
PMV BLD: 10.5 FL — HIGH (ref 7.4–10.4)
RBC # BLD: 3.12 M/UL — LOW (ref 4.2–5.4)
RBC # FLD: 19.6 % — HIGH (ref 11.5–14.5)
SPECIMEN SOURCE: SIGNIFICANT CHANGE UP
WBC # BLD: 7.93 K/UL — SIGNIFICANT CHANGE UP (ref 4.8–10.8)
WBC # FLD AUTO: 7.93 K/UL — SIGNIFICANT CHANGE UP (ref 4.8–10.8)

## 2024-05-27 PROCEDURE — 99232 SBSQ HOSP IP/OBS MODERATE 35: CPT

## 2024-05-27 RX ORDER — LEVETIRACETAM 250 MG/1
7.5 TABLET, FILM COATED ORAL
Qty: 450 | Refills: 0
Start: 2024-05-27 | End: 2024-06-25

## 2024-05-27 RX ADMIN — SCOPALAMINE 1 PATCH: 1 PATCH, EXTENDED RELEASE TRANSDERMAL at 07:32

## 2024-05-27 RX ADMIN — GABAPENTIN 300 MILLIGRAM(S): 400 CAPSULE ORAL at 17:39

## 2024-05-27 RX ADMIN — MIDODRINE HYDROCHLORIDE 10 MILLIGRAM(S): 2.5 TABLET ORAL at 12:09

## 2024-05-27 RX ADMIN — Medication 1 DROP(S): at 17:38

## 2024-05-27 RX ADMIN — LEVETIRACETAM 750 MILLIGRAM(S): 250 TABLET, FILM COATED ORAL at 17:39

## 2024-05-27 RX ADMIN — Medication 1 APPLICATION(S): at 17:38

## 2024-05-27 RX ADMIN — SCOPALAMINE 1 PATCH: 1 PATCH, EXTENDED RELEASE TRANSDERMAL at 05:53

## 2024-05-27 RX ADMIN — LEVETIRACETAM 750 MILLIGRAM(S): 250 TABLET, FILM COATED ORAL at 05:52

## 2024-05-27 RX ADMIN — ENOXAPARIN SODIUM 30 MILLIGRAM(S): 100 INJECTION SUBCUTANEOUS at 12:09

## 2024-05-27 RX ADMIN — FAMOTIDINE 20 MILLIGRAM(S): 10 INJECTION INTRAVENOUS at 17:39

## 2024-05-27 RX ADMIN — MIDODRINE HYDROCHLORIDE 10 MILLIGRAM(S): 2.5 TABLET ORAL at 22:27

## 2024-05-27 RX ADMIN — Medication 1 APPLICATION(S): at 05:54

## 2024-05-27 RX ADMIN — Medication 1 TABLET(S): at 12:09

## 2024-05-27 RX ADMIN — Medication 1 DROP(S): at 05:54

## 2024-05-27 RX ADMIN — SCOPALAMINE 1 PATCH: 1 PATCH, EXTENDED RELEASE TRANSDERMAL at 17:00

## 2024-05-27 RX ADMIN — Medication 3 MILLILITER(S): at 08:19

## 2024-05-27 RX ADMIN — MIDODRINE HYDROCHLORIDE 10 MILLIGRAM(S): 2.5 TABLET ORAL at 05:53

## 2024-05-27 RX ADMIN — RALOXIFENE HYDROCHLORIDE 60 MILLIGRAM(S): 60 TABLET, COATED ORAL at 12:09

## 2024-05-27 RX ADMIN — FAMOTIDINE 20 MILLIGRAM(S): 10 INJECTION INTRAVENOUS at 05:52

## 2024-05-27 RX ADMIN — GABAPENTIN 300 MILLIGRAM(S): 400 CAPSULE ORAL at 05:53

## 2024-05-27 NOTE — DISCHARGE NOTE PROVIDER - HOSPITAL COURSE
Patient is a 57y/o Female  w/ pmhx of Down's syndrome, cerebral palsy, seizure disorder presents from [facility] for respiratory distress. Admitted to SDU for severe sepsis and acute hypoxemic respiratory failure likely secondary to recurrent CAP.     # Acute hypoxemic respiratory failure   #Severe sepsis with septic shock poa- resolved   VRE bacteremia- resolved     Blood culture grew  VRE ,repeated blood cxs neg  - s/p TTE- no endocarditis , IV Dapto , IV Alicia x 1 week as per ID rec. ,  completed on 5/26    #possible recurrent pneumonia /suspected aspiration pna ( despite having peg )   #chronic hypotension   - covid/RSV, MSRA, negative; Bl cx with contaminant   - Hypotension C/w Midodrine   - target MAP 60 ( Patient always has BP on the softer side )   - blood cx 4/15: staph capitis,  blood cxs 4/15 and 4/17: NGTD  - CT chest angio w/ contrast 4/24: bilateral opacities   -  s/p  Meropenem  1g q8  x mutiple courses.        # rash, probably contact dermatitis, improving   hold chlorhexidine wash, added topical care , monitor     #Episodes of Vomiting, complicated by Aspiration   - recurrent aspiration risk is high for coexisting hiatal hernia   - On PEG feed  , now on lower volume, tolerating   - gastric emptying study, neg     # B/l feet ulcers  - colonized with ESBL strain  - Off loading to prevent pressure sores   - wound care following     #seizure disorder, cont meds   #Down syndrome  #Nonverbal bedbound  # PEG tube   - c/w home meds   Patient is a 59y/o Female  w/ pmhx of Down's syndrome, cerebral palsy, seizure disorder presents from [facility] for respiratory distress. Admitted to SDU for severe sepsis and acute hypoxemic respiratory failure likely secondary to recurrent CAP.     # Acute hypoxemic respiratory failure   #Severe sepsis with septic shock poa- resolved   VRE bacteremia- resolved     Blood culture grew  VRE ,repeated blood cxs neg  - s/p TTE- no endocarditis , IV Dapto , IV Alicia x 1 week as per ID rec. ,  completed on 5/26    #possible recurrent pneumonia /suspected aspiration pna ( despite having peg )   #chronic hypotension   - covid/RSV, MSRA, negative; Bl cx with contaminant   - Hypotension C/w Midodrine   - target MAP 60 ( Patient always has BP on the softer side )   - blood cx 4/15: staph capitis,  blood cxs 4/15 and 4/17: NGTD  - CT chest angio w/ contrast 4/24: bilateral opacities   -  s/p  Meropenem  1g q8  x mutiple courses.        # rash, probably contact dermatitis, improving   hold chlorhexidine wash, added topical care , monitor     #Episodes of Vomiting, complicated by Aspiration   - recurrent aspiration risk is high for coexisting hiatal hernia   - On PEG feed  , now on lower volume, tolerating   - gastric emptying study, neg     # B/l feet ulcers  - colonized with ESBL strain  - Off loading to prevent pressure sores   - wound care following     #seizure disorder, cont meds   #Down syndrome  #Nonverbal bedbound  # PEG tube   - c/w home meds    Attending Note:  Patient seen and examined independently. I personally had a face-to-face encounter with the patient, examined the patient myself, personally reviewed labs & Radiology images,  and reviewed the plan of care with the housestaff. Agree with resident's note but my note supersedes that of the resident in the matters hereby listed.   D/c to SNf.

## 2024-05-27 NOTE — DISCHARGE NOTE PROVIDER - INSTRUCTIONS
Diet, NPO:   Tube Feeding Modality: Gastrostomy  Jevity 1.2 Juventino (JEVITY1.2RTH)  Total Volume for 24 Hours (mL): 1080  Bolus  Total # of Feeds: 3  Tube Feed Frequency: Every 8 hours   Tube Feed Start Time: 18:00  Bolus Feed Rate (mL per Hour): 250   Bolus Feed Duration (in Hours): 1  Bolus Feed Instructions:  as tolerated, advance feeds to goal rate of 360 mL q8hrs  Free Water Flush   Total Volume of Bolus (mL):  360  Free Water Flush Instructions:  100 mL water flushes pre/post feeds (05-21-24 @ 13:35) [Pending Verification By Attending]  Diet, NPO: Tube Feeding Modality: Gastrostomy Jevity 1.2 Juventino (JEVITY1.2RTH)  Total Volume for 24 Hours (mL): 1000  Bolus Total # of Feeds: 4  Tube Feed Frequency: Every 6 hours   Tube Feed Start Time: 18:00  Bolus Feed Rate (mL per Hour): 250   Bolus Feed Duration (in Hours): 1  Free Water Flush   Total Volume of Bolus (mL):  250  Free Water Flush Instructions:  100 mL water flushes pre/post feeds (05-15-24 @ 16:06) [Active] Diet, NPO:   Tube Feeding Modality: Gastrostomy  Jevity 1.2 Juventino (JEVITY1.2RTH)  Total Volume for 24 Hours (mL): 1000 ml Bolus Total # of Feeds: 4  Tube Feed Frequency: Every 6 hours   Tube Feed Start Time: 18:00  Bolus Feed Rate (mL per Hour): 100   Bolus Feed Duration (in Hours): 2.5  Free Water Flush Instructions:  100 mL water flushes pre/post feeds

## 2024-05-27 NOTE — DISCHARGE NOTE PROVIDER - CARE PROVIDERS DIRECT ADDRESSES
[email or web address]

## 2024-05-27 NOTE — DISCHARGE NOTE PROVIDER - PROVIDER TOKENS
PROVIDER:[TOKEN:[[id number]],FOLLOWUP:[1 week],ESTABLISHEDPATIENT:[T]] PROVIDER:[TOKEN:[[id number]],FOLLOWUP:[1 week],ESTABLISHEDPATIENT:[T]],PROVIDER:[TOKEN:[[id number]],FOLLOWUP:[2 months]]

## 2024-05-27 NOTE — DISCHARGE NOTE PROVIDER - NSDCFUSCHEDAPPT_GEN_ALL_CORE_FT
Sarbjit Feng  [facility] Physician [facility]  NEUROLOGY [address]  Scheduled Appointment: 06/05/2024    Royer Cooper  [facility] PreAdmits  Scheduled Appointment: 07/30/2024    Royer Cooper  [facility] Physician [facility]  OPHTHALM [address]  Scheduled Appointment: 07/30/2024

## 2024-05-27 NOTE — DISCHARGE NOTE PROVIDER - NSDCMRMEDTOKEN_GEN_ALL_CORE_FT
docusate sodium 100 mg oral tablet: 1 tab(s) by PEG tube once a day as needed for  constipation  gabapentin 300 mg oral tablet: 1 cap(s) by PEG tube every 12 hours  ipratropium-albuterol 0.5 mg-2.5 mg/3 mL inhalation solution: 3 milliliter(s) inhaled every 6 hours as needed for  shortness of breath and/or wheezing  Keppra 100 mg/mL oral solution: 7.5 milliliter(s) orally 2 times a day by peg  Melatonin 3 mg oral tablet: 1 tab(s) by PEG tube once a day (at bedtime)  midodrine 10 mg oral tablet: 1 tab(s) orally 3 times a day Please discontinue 5mg TID dose, and start 10mg TID dose  ocular lubricant ophthalmic solution: 1 drop(s) to each affected eye 2 times a day  Oyster Shell 500 (1250 mg calcium carbonate) oral tablet: 1 tab(s) by PEG tube once a day  Pepcid 40 mg oral tablet: 1 tab(s) by PEG tube 2 times a day  petrolatum topical ointment: 1 Apply topically to affected area 2 times a day  Protonix 40 mg oral delayed release tablet: 1 tab(s) by PEG tube once a day  raloxifene 60 mg oral tablet: 1 tab(s) by PEG tube once a day  senna leaf extract oral tablet: 2 tab(s) orally once a day (at bedtime)

## 2024-05-27 NOTE — PROGRESS NOTE ADULT - SUBJECTIVE AND OBJECTIVE BOX
TEA ECHAVARRIA  [facility] 025 A ([facility])      Patient was evaluated and examined  by bedside, remains afebrile, on peg feedings      REVIEW OF SYSTEMS: unable to obtain, patient remains non-verbal         T(C): , Max: 37.8 (05-26-24 @ 14:26)  HR: 94 (05-27-24 @ 05:00)  BP: 107/66 (05-27-24 @ 05:00)  RR: 18 (05-27-24 @ 05:00)  SpO2: 95% (05-27-24 @ 05:00)  CAPILLARY BLOOD GLUCOSE      POCT Blood Glucose.: 127 mg/dL (27 May 2024 06:33)  POCT Blood Glucose.: 108 mg/dL (27 May 2024 00:26)  POCT Blood Glucose.: 116 mg/dL (26 May 2024 17:03)      PHYSICAL EXAM:  General: NAD, Awake, patient is laying comfortably in bed  HEENT: AT, NC, Supple, NO JVD, NO CB  Lungs: mild decreased breath sounds  B/L, no wheezing, mild b/l  rhonchi present  CVS: normal S1, S2, RRR, NO M/G/R  Abdomen: soft, bowel sounds present, non-tender, non-distended, peg present  Extremities: contracted extremities, no edema, no clubbing, no cyanosis, positive peripheral pulses b/l  Neuro: chronic , non-verbal, bed-confinement status  Skin: no rash, no ecchymosis        LAB  CBC  Date: 05-27-24 @ 06:15  Mean cell Btasljixhz38.8  Mean cell Hemoglobin Conc31.7  Mean cell Volum 93.9  Platelet count-Automate 395  RBC Count 3.12  Red Cell Distrib Width19.6  WBC Count7.93  % Albumin, Urine--  Hematocrit 29.3  Hemoglobin 9.3  CBC  Date: 05-26-24 @ 07:13  Mean cell Mbgofoiczc20.6  Mean cell Hemoglobin Conc31.3  Mean cell Volum 94.4  Platelet count-Automate 394  RBC Count 3.01  Red Cell Distrib Width20.0  WBC Count10.46  % Albumin, Urine--  Hematocrit 28.4  Hemoglobin 8.9  CBC  Date: 05-25-24 @ 07:35  Mean cell Yzzkzunjsz87.4  Mean cell Hemoglobin Conc30.5  Mean cell Volum 96.4  Platelet count-Automate 354  RBC Count 3.06  Red Cell Distrib Width20.5  WBC Count14.75  % Albumin, Urine--  Hematocrit 29.5  Hemoglobin 9.0  CBC  Date: 05-24-24 @ 06:55  Mean cell Lmpseeokyv34.4  Mean cell Hemoglobin Conc30.6  Mean cell Volum 96.3  Platelet count-Automate 452  RBC Count 3.26  Red Cell Distrib Width20.7  WBC Count13.39  % Albumin, Urine--  Hematocrit 31.4  Hemoglobin 9.6  CBC  Date: 05-23-24 @ 06:50  Mean cell Zvnwgdxwfp15.8  Mean cell Hemoglobin Conc31.5  Mean cell Volum 94.8  Platelet count-Automate 455  RBC Count 3.05  Red Cell Distrib Width20.9  WBC Count7.98  % Albumin, Urine--  Hematocrit 28.9  Hemoglobin 9.1  CBC  Date: 05-22-24 @ 06:36  Mean cell Fjmzecdxat21.6  Mean cell Hemoglobin Conc30.9  Mean cell Volum 95.7  Platelet count-Automate 484  RBC Count 3.28  Red Cell Distrib Width20.8  WBC Count13.41  % Albumin, Urine--  Hematocrit 31.4  Hemoglobin 9.7  CBC  Date: 05-21-24 @ 07:06  Mean cell Seyrvkphqp38.4  Mean cell Hemoglobin Conc30.5  Mean cell Volum 96.3  Platelet count-Automate 496  RBC Count 3.20  Red Cell Distrib Width20.7  WBC Count9.63  % Albumin, Urine--  Hematocrit 30.8  Hemoglobin 9.4    BMP  05-26-24 @ 07:13  Blood Gas Arterial-Calcium,Ionized--  Blood Urea Nitrogen, Serum 12 mg/dL [10 - 20]  Carbon Dioxide, Serum29 mmol/L [17 - 32]  Chloride, Serum96 mmol/L<L> [98 - 110]  Creatinie, Serum<0.5 mg/dL<L> [0.7 - 1.5]  Glucose, Kimcw771 mg/dL<H> [70 - 99]  Potassium, Serum4.2 mmol/L [3.5 - 5.0]  Sodium, Serum 135 mmol/L [135 - 146]  [facility]  05-25-24 @ 07:35  Blood Gas Arterial-Calcium,Ionized--  Blood Urea Nitrogen, Serum 14 mg/dL [10 - 20]  Carbon Dioxide, Serum27 mmol/L [17 - 32]  Chloride, Serum99 mmol/L [98 - 110]  Creatinie, Serum0.5 mg/dL<L> [0.7 - 1.5]  Glucose, Uznjh996 mg/dL<H> [70 - 99]  Potassium, Serum4.7 mmol/L [3.5 - 5.0]  Sodium, Serum 137 mmol/L [135 - 146]  [facility]  05-24-24 @ 06:55  Blood Gas Arterial-Calcium,Ionized--  Blood Urea Nitrogen, Serum 13 mg/dL [10 - 20]  Carbon Dioxide, Serum27 mmol/L [17 - 32]  Chloride, Serum98 mmol/L [98 - 110]  Creatinie, Serum0.5 mg/dL<L> [0.7 - 1.5]  Glucose, Serum89 mg/dL [70 - 99]  Potassium, Serum5.1 mmol/L<H> [3.5 - 5.0] [Slighty Hemolyzed use with Caution]  Sodium, Serum 137 mmol/L [135 - 146]  [facility]  05-23-24 @ 06:50  Blood Gas Arterial-Calcium,Ionized--  Blood Urea Nitrogen, Serum 14 mg/dL [10 - 20]  Carbon Dioxide, Serum28 mmol/L [17 - 32]  Chloride, Serum99 mmol/L [98 - 110]  Creatinie, Serum<0.5 mg/dL<L> [0.7 - 1.5]  Glucose, Fvcge381 mg/dL<H> [70 - 99]  Potassium, Serum4.4 mmol/L [3.5 - 5.0]  Sodium, Serum 136 mmol/L [135 - 146]              Microbiology:    Culture - Blood (collected 05-23-24 @ 11:56)  Source: .Blood None  Preliminary Report (05-26-24 @ 22:01):    No growth at 72 Hours    Culture - Blood (collected 05-22-24 @ 06:36)  Source: .Blood None  Preliminary Report (05-26-24 @ 17:00):    No growth at 4 days    Culture - Urine (collected 05-21-24 @ 14:20)  Source: Clean Catch Clean Catch (Midstream)  Final Report (05-22-24 @ 22:35):    <10,000 CFU/mL Normal Urogenital Mili    Culture - Blood (collected 05-18-24 @ 22:29)  Source: .Blood Blood  Gram Stain (05-20-24 @ 12:21):    Growth in aerobic bottle: Gram Positive Cocci in Clusters    Growth in anaerobic bottle: Gram Positive Cocci in Clusters  Final Report (05-21-24 @ 13:20):    Growth in aerobic and anaerobic bottles: Staphylococcus epidermidis    Isolation of Coagulase negative Staphylococcus from single blood culture    sets may represent    contamination. Contact the Microbiology Department at [phone] if    susceptibilitytesting is    clinically indicated.    Culture - Blood (collected 05-18-24 @ 22:29)  Source: .Blood Blood  Gram Stain (05-19-24 @ 17:12):    Growth in aerobic bottle: Gram Negative Rods and Gram positive cocci in    pairs  Final Report (05-23-24 @ 08:22):    Growth in aerobic bottle: Proteus mirabilis ESBL    Growth in aerobic bottle: Enterococcus faecalis (vancomycin resistant)    Growth in aerobic bottle: Enterococcus faecium (vancomycin resistant)    Direct identification is available within approximately3-5    hours either by Blood Panel Multiplexed PCR or Direct    MALDI-TOF. Details: [email or web address]    Vancomycin resistance gene detected    When multiple species' of Enterococcus are present, association of a    resistance gene to a specific organism cannot be determined.  Organism: Blood Culture PCR  Proteus mirabilis ESBL  Enterococcus faecalis (vancomycin resistant)  Enterococcus faecium (vancomycin resistant) (05-23-24 @ 08:22)  Organism: Enterococcus faecium (vancomycin resistant) (05-23-24 @ 08:22)      Method Type: ZANE      -  Ampicillin: R >8 Predicts results to ampicillin/sulbactam, amoxacillin-clavulanate and  piperacillin-tazobactam.      -  Daptomycin: SDD 2 The breakpoint for SDD (susceptible dose dependent)is based on a dosage regimen of 8-12 mg/kg administered every 24 h in adults and is intended for serious infections due to E. faecium. Consultation with an infectious diseases specialist is recommended.      -  Linezolid: S 2      -  Vancomycin: R >16      -  Gentamicin synergy: S <=500      -  Streptomycin synergy: S <=1000  Organism: Enterococcus faecalis (vancomycin resistant) (05-23-24 @ 08:22)      Method Type: ZANE      -  Ampicillin: S <=2 Predicts results to ampicillin/sulbactam, amoxacillin-clavulanate and  piperacillin-tazobactam.      -  Daptomycin: S 1      -  Linezolid: S 1      -  Vancomycin: R >16      -  Gentamicin synergy: S <=500      -  Streptomycin synergy: R >1000  Organism: Proteus mirabilis ESBL (05-23-24 @ 08:22)      Method Type: ZANE      -  Ampicillin: R >16 These ampicillin results predict results for amoxicillin      -  Ampicillin/Sulbactam: R <=4/2      -  Aztreonam: R <=4      -  Cefazolin: R >16      -  Cefepime: R 8      -  Ceftriaxone: R 32      -  Ciprofloxacin: R >2      -  Ertapenem: S <=0.5      -  Gentamicin: S <=2      -  Levofloxacin: R >4      -  Meropenem: S <=1      -  Piperacillin/Tazobactam: R <=8      -  Tobramycin: S <=2      -  Trimethoprim/Sulfamethoxazole: R >2/38  Organism: Blood Culture PCR (05-23-24 @ 08:22)      Method Type: PCR      -  Proteus species: Detec      -  Enterococcus faecalis: Detec      -  Enterococcus faecium: Detec      -  Staphylococcus epidermidis, Methicillin resistant: Detec      -  ESBL: Detec      -  CTX-M Resistance Marker: Detec    Culture - Blood (collected 05-06-24 @ 04:20)  Source: .Blood Blood-Peripheral  Final Report (05-11-24 @ 14:01):    No growth at 5 days    Culture - Blood (collected 05-05-24 @ 17:54)  Source: .Blood Blood  Final Report (05-11-24 @ 01:00):    No growth at 5 days    Culture - Blood (collected 05-04-24 @ 07:07)  Source: .Blood None  Final Report (05-09-24 @ 18:00):    No growth at 5 days    Culture - Blood (collected 05-03-24 @ 18:00)  Source: .Blood Blood  Final Report (05-08-24 @ 23:10):    No growth at 5 days    Culture - Blood (collected 04-28-24 @ 07:23)  Source: .Blood None  Final Report (05-03-24 @ 17:00):    No growth at 5 days      Medications:  acetaminophen     Tablet .. 650 milliGRAM(s) Oral every 6 hours PRN  albuterol/ipratropium for Nebulization 3 milliLiter(s) Nebulizer every 6 hours  AQUAPHOR (petrolatum Ointment) 1 Application(s) Topical two times a day  artificial  tears Solution 1 Drop(s) Both EYES two times a day  calcium carbonate   1250 mG (OsCal) 1 Tablet(s) Oral daily  enoxaparin Injectable 30 milliGRAM(s) SubCutaneous every 24 hours  famotidine    Tablet 20 milliGRAM(s) Oral two times a day  gabapentin Solution 300 milliGRAM(s) Oral two times a day  levETIRAcetam  Solution 750 milliGRAM(s) Oral two times a day  melatonin 3 milliGRAM(s) Oral at bedtime PRN  midodrine 10 milliGRAM(s) Oral every 8 hours  ondansetron Injectable 4 milliGRAM(s) IV Push every 8 hours PRN  raloxifene 60 milliGRAM(s) Oral daily  scopolamine 1 mG/72 Hr(s) Patch 1 Patch Transdermal every 72 hours  senna 2 Tablet(s) Oral at bedtime        Assessment and Plan:  Patient is a 59y/o Female  w/ pmhx of Down's syndrome, cerebral palsy, seizure disorder presents from [facility] for respiratory distress. Admitted to SDU for severe sepsis and acute hypoxemic respiratory failure likely secondary to recurrent CAP.     # Acute hypoxemic respiratory failure   #Severe sepsis with septic shock poa- resolved   VRE bacteremia- resolved     Blood culture grew  VRE ,repeated blood cxs neg  - s/p TTE- no endocarditis , IV Dapto , IV Alicia x 1 week as per ID rec. ,  completed on 5/26    #possible recurrent pneumonia /suspected aspiration pna ( despite having peg )   #chronic hypotension   - covid/RSV, MSRA, negative; Bl cx with contaminant   - Hypotension C/w Midodrine   - target MAP 60 ( Patient always has BP on the softer side )   - blood cx 4/15: staph capitis,  blood cxs 4/15 and 4/17: NGTD  - CT chest angio w/ contrast 4/24: bilateral opacities   -  s/p  Meropenem  1g q8  x mutiple courses.        # rash, probably contact dermatitis, improving   hold chlorhexidine wash, added topical care , monitor     #Episodes of Vomiting, complicated by Aspiration   - recurrent aspiration risk is high for coexisting hiatal hernia   - On PEG feed  , now on lower volume, tolerating   - gastric emptying study, neg     # B/l feet ulcers  - colonized with ESBL strain  - Off loading to prevent pressure sores   - wound care following     #seizure disorder, cont meds   #Down syndrome  #Nonverbal bedbound  # PEG tube   - c/w home meds    Overall prognosis poor,     #Progress Note Handoff    Pending : Patient was medically optimized,  completed IV abx. tx, stable  for d/c to group home when bed is available   Disposition: group home   code status: DNR, DNI     Total time spent to complete patient's bedside assessment, review medical chart, discuss medical plan of care with covering medical team was more than 35 minutes with >50% of time spent face to face with patient, discussion with patient/family and/or coordination of care .

## 2024-05-27 NOTE — DISCHARGE NOTE PROVIDER - NSDCCPCAREPLAN_GEN_ALL_CORE_FT
PRINCIPAL DISCHARGE DIAGNOSIS  Diagnosis: Acute sepsis  Assessment and Plan of Treatment: you were admitted for sepsis   you got antibiotics to treat it  you have recurrent sepsis likely secondary to aspiration pneumonia   PLEASE STRICT ASPIRATION PRECAUTIONS   HEAD OF BED AT 45 WHEN EATING, PLEASE MONITOR ALL FEEDS throuhg peg   keep head of bed up after peg tube feeds         SECONDARY DISCHARGE DIAGNOSES  Diagnosis: Acute respiratory failure with hypoxia  Assessment and Plan of Treatment:     Diagnosis: Bilateral pneumonia  Assessment and Plan of Treatment:      PRINCIPAL DISCHARGE DIAGNOSIS  Diagnosis: Acute sepsis  Assessment and Plan of Treatment: you were admitted for sepsis   you got antibiotics to treat it (iv abx finished 5/26)   repeat blood cultures are now negative   you have recurrent sepsis likely secondary to aspiration pneumonia   PLEASE STRICT ASPIRATION PRECAUTIONS   HEAD OF BED AT 45degrees WHEN EATING, PLEASE MONITOR ALL FEEDS through peg   keep head of bed up to 45  after peg tube feeds fpr 2 hours  wound care orders                              Skin assessed- B/L  lateral and medial foot multiple  partial and full  thickness wounds - Wound base varying with pink, yellow and brown  to black devitalised skin tissue                  No odor, erythema, increased warmth, tenderness, induration, fluctuance, nor crepitus.                 R heel unstagable  pressure injury ~3x2 in size                Wound base marbleized with  pink and black devitalised skin tissue                   No odor, erythema, increased warmth, tenderness, induration, fluctuance, nor crepitus.                    B/l buttock moisture associated dermatitis with  erythema and denuded skin   Wound and skin care recs   Clean Lower extremity wounds with  vashe wound cleanser pat dry then apply silvadene with  non adhering and Kerlix dressing .  follow up with podiatry after discharge   Clean B/ L buttock with soap and water, Pat dry then apply triad hydrophilic dressing   Pressure  injury  preventive  measures  Skin  and incontinence care         SECONDARY DISCHARGE DIAGNOSES  Diagnosis: Acute respiratory failure with hypoxia  Assessment and Plan of Treatment:     Diagnosis: Bilateral pneumonia  Assessment and Plan of Treatment:

## 2024-05-27 NOTE — DISCHARGE NOTE PROVIDER - CARE PROVIDER_API CALL
Maggie Crow J  Internal Medicine  [address] [place] [address]  Phone: [phone]  Fax: [phone]  Established Patient  Follow Up Time: 1 week   Maggie Crow J  Internal Medicine  [address] [place] [address]  Phone: [phone]  Fax: [phone]  Established Patient  Follow Up Time: 1 week    Kingsley Everett  Podiatric Medicine and Surgery  [address]  [place] [address]  Phone: [phone]  Fax: [phone]  Follow Up Time: 2 months

## 2024-05-27 NOTE — DISCHARGE NOTE PROVIDER - NSCORESITESY/N_GEN_A_CORE_RD
Left L5 nerve root irritation (\"pinched nerve\")  Will send in a new Rx of Baclofen 20 mg tablets No

## 2024-05-28 LAB
ALBUMIN SERPL ELPH-MCNC: 2.7 G/DL — LOW (ref 3.5–5.2)
ALP SERPL-CCNC: 99 U/L — SIGNIFICANT CHANGE UP (ref 30–115)
ALT FLD-CCNC: 10 U/L — SIGNIFICANT CHANGE UP (ref 0–41)
ANION GAP SERPL CALC-SCNC: 11 MMOL/L — SIGNIFICANT CHANGE UP (ref 7–14)
AST SERPL-CCNC: 13 U/L — SIGNIFICANT CHANGE UP (ref 0–41)
BASOPHILS # BLD AUTO: 0.06 K/UL — SIGNIFICANT CHANGE UP (ref 0–0.2)
BASOPHILS NFR BLD AUTO: 0.8 % — SIGNIFICANT CHANGE UP (ref 0–1)
BILIRUB SERPL-MCNC: <0.2 MG/DL — SIGNIFICANT CHANGE UP (ref 0.2–1.2)
BUN SERPL-MCNC: 13 MG/DL — SIGNIFICANT CHANGE UP (ref 10–20)
CALCIUM SERPL-MCNC: 8.7 MG/DL — SIGNIFICANT CHANGE UP (ref 8.4–10.5)
CHLORIDE SERPL-SCNC: 96 MMOL/L — LOW (ref 98–110)
CO2 SERPL-SCNC: 29 MMOL/L — SIGNIFICANT CHANGE UP (ref 17–32)
CREAT SERPL-MCNC: 0.5 MG/DL — LOW (ref 0.7–1.5)
CULTURE RESULTS: SIGNIFICANT CHANGE UP
EGFR: 109 ML/MIN/1.73M2 — SIGNIFICANT CHANGE UP
EOSINOPHIL # BLD AUTO: 0.43 K/UL — SIGNIFICANT CHANGE UP (ref 0–0.7)
EOSINOPHIL NFR BLD AUTO: 5.6 % — SIGNIFICANT CHANGE UP (ref 0–8)
GLUCOSE BLDC GLUCOMTR-MCNC: 107 MG/DL — HIGH (ref 70–99)
GLUCOSE SERPL-MCNC: 121 MG/DL — HIGH (ref 70–99)
HCT VFR BLD CALC: 30.8 % — LOW (ref 37–47)
HGB BLD-MCNC: 9.5 G/DL — LOW (ref 12–16)
IMM GRANULOCYTES NFR BLD AUTO: 0.3 % — SIGNIFICANT CHANGE UP (ref 0.1–0.3)
LYMPHOCYTES # BLD AUTO: 1.68 K/UL — SIGNIFICANT CHANGE UP (ref 1.2–3.4)
LYMPHOCYTES # BLD AUTO: 21.8 % — SIGNIFICANT CHANGE UP (ref 20.5–51.1)
MAGNESIUM SERPL-MCNC: 2.3 MG/DL — SIGNIFICANT CHANGE UP (ref 1.8–2.4)
MCHC RBC-ENTMCNC: 29.7 PG — SIGNIFICANT CHANGE UP (ref 27–31)
MCHC RBC-ENTMCNC: 30.8 G/DL — LOW (ref 32–37)
MCV RBC AUTO: 96.3 FL — SIGNIFICANT CHANGE UP (ref 81–99)
MONOCYTES # BLD AUTO: 0.43 K/UL — SIGNIFICANT CHANGE UP (ref 0.1–0.6)
MONOCYTES NFR BLD AUTO: 5.6 % — SIGNIFICANT CHANGE UP (ref 1.7–9.3)
NEUTROPHILS # BLD AUTO: 5.07 K/UL — SIGNIFICANT CHANGE UP (ref 1.4–6.5)
NEUTROPHILS NFR BLD AUTO: 65.9 % — SIGNIFICANT CHANGE UP (ref 42.2–75.2)
NRBC # BLD: 0 /100 WBCS — SIGNIFICANT CHANGE UP (ref 0–0)
PLATELET # BLD AUTO: 414 K/UL — HIGH (ref 130–400)
PMV BLD: 9.7 FL — SIGNIFICANT CHANGE UP (ref 7.4–10.4)
POTASSIUM SERPL-MCNC: 4.5 MMOL/L — SIGNIFICANT CHANGE UP (ref 3.5–5)
POTASSIUM SERPL-SCNC: 4.5 MMOL/L — SIGNIFICANT CHANGE UP (ref 3.5–5)
PROT SERPL-MCNC: 6 G/DL — SIGNIFICANT CHANGE UP (ref 6–8)
RBC # BLD: 3.2 M/UL — LOW (ref 4.2–5.4)
RBC # FLD: 19.1 % — HIGH (ref 11.5–14.5)
SODIUM SERPL-SCNC: 136 MMOL/L — SIGNIFICANT CHANGE UP (ref 135–146)
SPECIMEN SOURCE: SIGNIFICANT CHANGE UP
WBC # BLD: 7.69 K/UL — SIGNIFICANT CHANGE UP (ref 4.8–10.8)
WBC # FLD AUTO: 7.69 K/UL — SIGNIFICANT CHANGE UP (ref 4.8–10.8)

## 2024-05-28 PROCEDURE — 99232 SBSQ HOSP IP/OBS MODERATE 35: CPT

## 2024-05-28 RX ADMIN — FAMOTIDINE 20 MILLIGRAM(S): 10 INJECTION INTRAVENOUS at 05:54

## 2024-05-28 RX ADMIN — GABAPENTIN 300 MILLIGRAM(S): 400 CAPSULE ORAL at 05:53

## 2024-05-28 RX ADMIN — LEVETIRACETAM 750 MILLIGRAM(S): 250 TABLET, FILM COATED ORAL at 05:53

## 2024-05-28 RX ADMIN — FAMOTIDINE 20 MILLIGRAM(S): 10 INJECTION INTRAVENOUS at 17:41

## 2024-05-28 RX ADMIN — Medication 3 MILLILITER(S): at 19:38

## 2024-05-28 RX ADMIN — SCOPALAMINE 1 PATCH: 1 PATCH, EXTENDED RELEASE TRANSDERMAL at 18:45

## 2024-05-28 RX ADMIN — GABAPENTIN 300 MILLIGRAM(S): 400 CAPSULE ORAL at 17:41

## 2024-05-28 RX ADMIN — MIDODRINE HYDROCHLORIDE 10 MILLIGRAM(S): 2.5 TABLET ORAL at 21:45

## 2024-05-28 RX ADMIN — Medication 1 APPLICATION(S): at 05:59

## 2024-05-28 RX ADMIN — Medication 1 DROP(S): at 17:12

## 2024-05-28 RX ADMIN — Medication 3 MILLILITER(S): at 02:45

## 2024-05-28 RX ADMIN — MIDODRINE HYDROCHLORIDE 10 MILLIGRAM(S): 2.5 TABLET ORAL at 13:18

## 2024-05-28 RX ADMIN — MIDODRINE HYDROCHLORIDE 10 MILLIGRAM(S): 2.5 TABLET ORAL at 05:54

## 2024-05-28 RX ADMIN — SCOPALAMINE 1 PATCH: 1 PATCH, EXTENDED RELEASE TRANSDERMAL at 06:46

## 2024-05-28 RX ADMIN — Medication 1 DROP(S): at 05:54

## 2024-05-28 RX ADMIN — SENNA PLUS 2 TABLET(S): 8.6 TABLET ORAL at 21:46

## 2024-05-28 RX ADMIN — LEVETIRACETAM 750 MILLIGRAM(S): 250 TABLET, FILM COATED ORAL at 17:41

## 2024-05-28 RX ADMIN — RALOXIFENE HYDROCHLORIDE 60 MILLIGRAM(S): 60 TABLET, COATED ORAL at 11:05

## 2024-05-28 RX ADMIN — Medication 3 MILLILITER(S): at 14:44

## 2024-05-28 RX ADMIN — Medication 1 APPLICATION(S): at 17:12

## 2024-05-28 RX ADMIN — Medication 1 TABLET(S): at 11:05

## 2024-05-28 RX ADMIN — ENOXAPARIN SODIUM 30 MILLIGRAM(S): 100 INJECTION SUBCUTANEOUS at 13:18

## 2024-05-28 NOTE — PROGRESS NOTE ADULT - SUBJECTIVE AND OBJECTIVE BOX
24H events:    Patient is a 58y old Female who presents with a chief complaint of Pneumonia (27 May 2024 11:10)    Primary diagnosis of Acute sepsis        Today is 43d of hospitalization. This morning patient was seen and examined at bedside, resting comfortably in bed.    No acute or major events overnight.      PAST MEDICAL & SURGICAL HISTORY  Down syndrome    Osteoporosis    Mild anemia    Neuropathy    S/P debridement  of R hip on 3/2/21      SOCIAL HISTORY:  Social History:      ALLERGIES:  chlorhexidine containing compounds (Rash (Mild to Mod))    MEDICATIONS:  STANDING MEDICATIONS  albuterol/ipratropium for Nebulization 3 milliLiter(s) Nebulizer every 6 hours  AQUAPHOR (petrolatum Ointment) 1 Application(s) Topical two times a day  artificial  tears Solution 1 Drop(s) Both EYES two times a day  calcium carbonate   1250 mG (OsCal) 1 Tablet(s) Oral daily  enoxaparin Injectable 30 milliGRAM(s) SubCutaneous every 24 hours  famotidine    Tablet 20 milliGRAM(s) Oral two times a day  gabapentin Solution 300 milliGRAM(s) Oral two times a day  levETIRAcetam  Solution 750 milliGRAM(s) Oral two times a day  midodrine 10 milliGRAM(s) Oral every 8 hours  raloxifene 60 milliGRAM(s) Oral daily  scopolamine 1 mG/72 Hr(s) Patch 1 Patch Transdermal every 72 hours  senna 2 Tablet(s) Oral at bedtime    PRN MEDICATIONS  acetaminophen     Tablet .. 650 milliGRAM(s) Oral every 6 hours PRN  melatonin 3 milliGRAM(s) Oral at bedtime PRN  ondansetron Injectable 4 milliGRAM(s) IV Push every 8 hours PRN    VITALS:   T(F): 98.1  HR: 76  BP: 98/62  RR: 18  SpO2: 97%    PHYSICAL EXAM:    GENERAL: non verbal  ( x) NAD, lying in bed comfortably     (  ) obtunded     (  ) lethargic     (  ) somnolent    HEAD:   ( x) Atraumatic     (  ) hematoma     (  ) laceration (specify location:       )     NECK:  ( ) Supple     (  ) neck stiffness     (  ) nuchal rigidity     (  )  no JVD     (  ) JVD present ( -- cm)    HEART:  Rate -->     (x) normal rate     (  ) bradycardic     (  ) tachycardic  Rhythm -->     (x) regular     (  ) regularly irregular     (  ) irregularly irregular  Murmurs -->     ( ) normal s1s2     (  ) systolic murmur     (  ) diastolic murmur     (  ) continuous murmur      (  ) S3 present     (  ) S4 present    LUNGS:   ( x)Unlabored respirations     (  ) tachypnea  (  ) B/L air entry     (  ) decreased breath sounds in:  (location     )    (  ) no adventitious sound     (  ) crackles     (  ) wheezing      (  ) rhonchi      (specify location:       )  (  ) chest wall tenderness (specify location:       )    ABDOMEN: +PEG  (  ) Soft     (  ) tense   |   (   ) nondistended     (  ) distended   |   (   ) +BS     (  ) hypoactive bowel sounds     (  ) hyperactive bowel sounds  (  ) nontender     (  ) RUQ tenderness     (  ) RLQ tenderness     (  ) LLQ tenderness     (  ) epigastric tenderness     (  ) diffuse tenderness  (  ) Splenomegaly      (  ) Hepatomegaly      (  ) Jaundice     (  ) ecchymosis     EXTREMITIES:  (  ) Normal     (  ) Rash     (  ) ecchymosis     (  ) varicose veins      (  ) pitting edema     (  ) non-pitting edema   (  ) ulceration     (  ) gangrene:     (location:     )    NERVOUS SYSTEM:  nonverbal  (  ) A&Ox3     (  ) confused     (  ) lethargic  CN II-XII:     (  ) Intact     (  ) deficits found     (Specify:     )   Upper extremities:     (  ) no sensorimotor deficits     (  ) weakness     (  ) loss of proprioception/vibration     (  ) loss of touch/temperature (specify:    )  Lower extremities:     (  ) no sensorimotor deficits     (  ) weakness     (  ) loss of proprioception/vibration     (  ) loss of touch/temperature (specify:    )    SKIN:   (   ) No rashes or lesions     (  ) maculopapular rash     (  ) pustules     (  ) vesicles     (  ) ulcer     (  ) ecchymosis     (specify location:     )          LABS:                        9.5    7.69  )-----------( 414      ( 28 May 2024 06:53 )             30.8     05-28    136  |  96<L>  |  13  ----------------------------<  121<H>  4.5   |  29  |  0.5<L>    Ca    8.7      28 May 2024 06:53  Mg     2.3     05-28    TPro  6.0  /  Alb  2.7<L>  /  TBili  <0.2  /  DBili  x   /  AST  13  /  ALT  10  /  AlkPhos  99  05-28      Urinalysis Basic - ( 28 May 2024 06:53 )    Color: x / Appearance: x / SG: x / pH: x  Gluc: 121 mg/dL / Ketone: x  / Bili: x / Urobili: x   Blood: x / Protein: x / Nitrite: x   Leuk Esterase: x / RBC: x / WBC x   Sq Epi: x / Non Sq Epi: x / Bacteria: x

## 2024-05-28 NOTE — PROGRESS NOTE ADULT - ATTENDING SUPERVISION STATEMENT
Resident
Fellow
Resident

## 2024-05-28 NOTE — PROGRESS NOTE ADULT - ASSESSMENT
Patient is a 57y/o Female  w/ pmhx of Down's syndrome, cerebral palsy, seizure disorder presents from [facility] for respiratory distress. Admitted to SDU for severe sepsis and acute hypoxemic respiratory failure likely secondary to recurrent CAP requiring pressors, now off.     # Acute hypoxemic respiratory failure   #Severe sepsis with septic shock poa  - Blood Cx 5/18 - GPC in clusters, E faecalis, E faecium, Proteus mirabilis, ESBL Gene dtected   - repeated blood cxs neg  - ID consult appreciated  - s/p TTE- no endocarditis , IV Dapto , IV Alicia x 1 week last day 5/26  - Monitor off Abx when course above complete  - Trend WBC    #possible recurrent pneumonia /suspected aspiration pna ( despite having peg )   #chronic hypotension   - Covid/RSV/MSRA negative  - C/w Midodrine 10mg TID  - target MAP 60 ( Patient always has BP on the softer side )      # rash, probably contact dermatitis, improving   - hold chlorhexidine wash,   - c/w added topical care , monitor     #Episodes of Vomiting, complicated by Aspiration   - recurrent aspiration risk is high for coexisting hiatal hernia   - On PEG feed on lower volume  - gastric emptying study neg     # B/l feet ulcers  - colonized with ESBL strain  - Off loading to prevent pressure sores   - wound care following     #seizure disorder, cont meds   #Down syndrome  #Nonverbal bedbound  # PEG tube   - c/w home meds    Diet: Tube feeds  Activity: AAT  DVT Prophylaxis: lovenox  GI Prophylaxis: Famotidine  Code Status: DNR/DNI  Disposition: Group home pending ABx completion

## 2024-05-28 NOTE — PROGRESS NOTE ADULT - REASON FOR ADMISSION
Pneumonia
no

## 2024-05-28 NOTE — PROGRESS NOTE ADULT - ATTENDING COMMENTS
57yo F w/ pmhx of Down's syndrome, cerebral palsy, seizure disorder presents from [facility] for respiratory distress. Admitted to SDU for severe sepsis and acute hypoxemic respiratory failure likely secondary to recurrent CAP.     # Acute hypoxemic respiratory failure   #Severe sepsis with septic shock poa, recurrent fever, new bacteremia, dw ID, added dapto, fu cultures , repeat bc , check lactic acid   #possible recurrent pneumonia /suspected aspiration pna ( despite having peg )   #chronic hypotension   - covid/RSV, MSRA, negative; Bl cx with contaminant   - C/w Midodrine 5mg  - target MAP 60 ( Patient always has BP on the softer side )   - blood cx 4/15: staph capitis  - blood cx 4/15 and 4/17: NGTD  - Scopolamine for secretions. aspiration precautions   - Palliative care following   - 4/22 and 4/23: spiked fever and WBC ;WBC downtrending (10 on 4/26)  - F/u bcx 4/23 -> no growth to date  - CT chest angio w/ contrast 4/24: bilateral opacities   - currently on 3L NC   -  fungitell  >> nEG   -Blood cx >> NGTD 04/28  - 5/5-5/6:  overnight patient was tachy to 140s, hypotensive (around baseline), febrile to 38.5, given 1L LR bolus and then additional 500cc for elevated lactate of 3  - lactate improved overnight, tachy and febrile this am on 5/7/24, given 500cc bolus, CT showing asp pna,  - given 1x danna and vanco dose,    s/p  Meropenem  1g q8  x mutiple courses.   - we reduced volume of diet bolus to 250 cc due to suspected gastric distention /hernia, contributing to aspiration     # rash, probably contact dermatitis, improving   hold chlorhexidine wash, added topical care , monitor     #Episodes of Vomiting, complicated by Aspiration   - recurrent aspiration risk is high for coexisting hiatal hernia   - On PEG feed  , now on lower volume, tolerating   - gastric emptying study, neg     # B/l feet ulcers  - No abscess/cellulitis  - colonized with ESBL strain  - Off loading to prevent pressure sores   - wound care following     #seizure disorder, cont meds   #Down syndrome  #Nonverbal bedbound  # PEG tube   - c/w home meds    #Progress Note Handoff    Pending :  meeting with group home on Monday , recall ID   Family discussion: meeting with group home is being arranged by the    Disposition: group home   code status: dnr, dni .
57yo F w/ pmhx of Down's syndrome, cerebral palsy, seizure disorder presents from [facility] for respiratory distress. Admitted to SDU for severe sepsis and acute hypoxemic respiratory failure likely secondary to recurrent CAP.     # Acute hypoxemic respiratory failure   #Severe sepsis with septic shock poa  #possible recurrent pneumonia /suspected aspiration pna ( despite having peg )   #chronic hypotension   - covid/RSV, MSRA, negative; Bl cx with contaminant   - C/w Midodrine 5mg  - target MAP 60 ( Patient always has BP on the softer side )   - blood cx 4/15: staph capitis  - blood cx 4/15 and 4/17: NGTD  - Scopolamine for secretions. aspiration precautions   - Palliative care following   - 4/22 and 4/23: spiked fever and WBC ;WBC downtrending (10 on 4/26)  - F/u bcx 4/23 -> no growth to date  - CT chest angio w/ contrast 4/24: bilateral opacities   - currently on 3L NC   -  fungitell  >> nEG   -Blood cx >> NGTD 04/28  - 5/5-5/6:  overnight patient was tachy to 140s, hypotensive (around baseline), febrile to 38.5, given 1L LR bolus and then additional 500cc for elevated lactate of 3  - lactate improved overnight, tachy and febrile this am on 5/7/24, given 500cc bolus, CT showing asp pna,  - given 1x danna and vanco dose,    s/p  Meropenem  1g q8  x mutiple courses.   - will reduce volume of diet bolus to 250 cc due to suspected gastric distention contributing to aspiration   - remains afebrile > 72 hrs and leukocytosis has resolved   reduced the volume of bolus feeds given high suspicion for aspiration from hiatal hernia     # rash, probably contact dermatitis, improving ,   hold chlorhexidine wash, added steroid topical, monitor     #Episodes of Vomiting, complicated by Aspiration   - recurrent aspiration risk is high for coexisting hiatal hernia   - On PEG feed  , now on lower volume, tolerating   - gastric emptying study, neg     # B/l feet ulcers  - No abscess/cellulitis  - colonized with ESBL strain  - Off loading to prevent pressure sores   - wound care following     #seizure disorder, cont meds   #Down syndrome  #Nonverbal bedbound  # PEG tube   - c/w home meds    #Progress Note Handoff    Pending :  rash to improve, meeting with group home on monday   Family discussion: meeting with group home is being arranged by the    Disposition: group home   code status: dnr, dni .
57yo F w/ pmhx of Down's syndrome, cerebral palsy, seizure disorder presents from [facility] for respiratory distress. Admitted to SDU for severe sepsis and acute hypoxemic respiratory failure likely secondary to recurrent CAP.     # Acute hypoxemic respiratory failure   #Severe sepsis with septic shock poa  #possible recurrent pneumonia /suspected aspiration pna ( despite having peg )   #chronic hypotension   - covid/RSV, MSRA, negative; Bl cx with contaminant   - C/w Midodrine 5mg  - target MAP 60 ( Patient always has BP on the softer side )   - blood cx 4/15: staph capitis  - blood cx 4/15 and 4/17: NGTD  - Scopolamine for secretions. aspiration precautions   - Palliative care following   - 4/22 and 4/23: spiked fever and WBC ;WBC downtrending (10 on 4/26)  - F/u bcx 4/23 -> no growth to date  - CT chest angio w/ contrast 4/24: bilateral opacities   - currently on 3L NC   -  fungitell  >> nEG   -Blood cx >> NGTD 04/28  - 5/5-5/6:  overnight patient was tachy to 140s, hypotensive (around baseline), febrile to 38.5, given 1L LR bolus and then additional 500cc for elevated lactate of 3  - lactate improved overnight, tachy and febrile this am on 5/7/24, given 500cc bolus, CT showing asp pna,  - given 1x danna and vanco dose,    s/p  Meropenem  1g q8  x mutiple courses.   - will reduce volume of diet bolus to 250 cc due to suspected gastric distention contributing to aspiration   - remains afebrile > 72 hrs and leukocytosis has resolved   reduced the volume of bolus feeds given high suspicion for aspiration from hiatal hernia     # rash, probably contact dermatitis, improving   hold chlorhexidine wash, added topical care , monitor     #Episodes of Vomiting, complicated by Aspiration   - recurrent aspiration risk is high for coexisting hiatal hernia   - On PEG feed  , now on lower volume, tolerating   - gastric emptying study, neg     # B/l feet ulcers  - No abscess/cellulitis  - colonized with ESBL strain  - Off loading to prevent pressure sores   - wound care following     #seizure disorder, cont meds   #Down syndrome  #Nonverbal bedbound  # PEG tube   - c/w home meds    #Progress Note Handoff    Pending :  meeting with group home on monday   Family discussion: meeting with group home is being arranged by the    Disposition: group home   code status: dnr, dni .
57yo F w/ pmhx of Down's syndrome, cerebral palsy, seizure disorder presents from [facility] for respiratory distress. Admitted to SDU for severe sepsis and acute hypoxemic respiratory failure likely secondary to recurrent CAP.     # Acute hypoxemic respiratory failure   #Severe sepsis with septic shock poa  #possible recurrent CAP   #chronic hypotension   - CXR: possible basilar opacities  - covid/RSV, MSRA, negative; Bl cx with contaminant   - C/w Midodrine 5mg  - target MAP 60 ( Patient always has BP on the softer side )   - blood cx 4/15: staph capitis  - blood cx 4/15 and 4/17: NGTD  - Scopolamine for secretions. aspiration precautions   - Palliative care following   - 4/22 and 4/23: spiked fever and WBC ;WBC downtrending (10 on 4/26)  - Continue Meropenem (4/15 - )  - F/u bcx 4/23 -> no growth to date  - CT chest angio w/ contrast 4/24: bilateral opacities   - currently on 4L facemask  - 4/27 AM temp 101  - 4/28 >> Tmax 102.2   - 4/29 >> Tmax 99.4  -  fungitell  >> nEG   -Blood cx >> NGTD 04/28  - Follow up ID    #Episodes of Vomiting:   - On PEG feed   - Aspiration risk ; C Xray done   - Can restart the PEG feeding for now     # B/l feet ulcers  - No abscess/cellulitis  - colonized with ESBL strain  - Off loading to prevent pressure sores   - wound care following     #seizure disorder  #Down syndrome  #Nonverbal bedbound  # PEG tube   - c/w home meds    #Progress Note Handoff    Pending :  ID followup  Family discussion: will need meeting with group home admin prior to return   Disposition: group home   code status: dnr, dni
59yo F w/ pmhx of Down's syndrome, cerebral palsy, seizure disorder presents from [facility] for respiratory distress. Admitted to SDU for severe sepsis and acute hypoxemic respiratory failure likely secondary to recurrent CAP.     # Acute hypoxemic respiratory failure   #Severe sepsis with septic shock poa  #possible recurrent pneumonia /suspected aspiration pna ( despite having peg )   #chronic hypotension   - covid/RSV, MSRA, negative; Bl cx with contaminant   - C/w Midodrine 5mg  - target MAP 60 ( Patient always has BP on the softer side )   - blood cx 4/15: staph capitis  - blood cx 4/15 and 4/17: NGTD  - Scopolamine for secretions. aspiration precautions   - Palliative care following   - 4/22 and 4/23: spiked fever and WBC ;WBC downtrending (10 on 4/26)  - F/u bcx 4/23 -> no growth to date  - CT chest angio w/ contrast 4/24: bilateral opacities   - currently on 3L NC   -  fungitell  >> nEG   -Blood cx >> NGTD 04/28  - 5/5-5/6:  overnight patient was tachy to 140s, hypotensive (around baseline), febrile to 38.5, given 1L LR bolus and then additional 500cc for elevated lactate of 3  - lactate improved overnight, tachy and febrile this am on 5/7/24, given 500cc bolus, CT showing asp pna,  - given 1x danna and vanco dose,    s/p  Meropenem  1g q8  x mutiple courses.   - will reduce volume of diet bolus to 250 cc due to suspected gastric distention contributing to aspiration   - remains afebrile > 72 hrs and leukocytosis has resolved   reduced the volume of bolus feeds given high suspicion for aspiration from hiatal hernia     # rash, probably contact dermatitis,   hold chlorhexidine wash, added steroid topical, monitor     #Episodes of Vomiting, complicated by Aspiration   - recurrent aspiration risk is high for coexisting hiatal hernia   - On PEG feed  , now on lower volume, tolerating   - gastric emptying study, neg     # B/l feet ulcers  - No abscess/cellulitis  - colonized with ESBL strain  - Off loading to prevent pressure sores   - wound care following     #seizure disorder, cont meds   #Down syndrome  #Nonverbal bedbound  # PEG tube   - c/w home meds    #Progress Note Handoff    Pending :  rash to improve   Family discussion: meeting with group home is being arranged by the    Disposition: group home   code status: dnr, dni
59yo F w/ pmhx of Down's syndrome, cerebral palsy, seizure disorder presents from [facility] for respiratory distress. Admitted to SDU for severe sepsis and acute hypoxemic respiratory failure likely secondary to recurrent CAP.     #acute hypoxemic respiratory failure   #Severe sepsis with septic shock POA  (WBC 12.25, Tmax 104.4F, 84% RA; hypotensive initially SBP 70s + source)  #possible recurrent pneumonia / suspected aspiration   # Skin assessed- B/L  lateral and medial foot multiple  partial and full  thickness wounds - Wound base varying with pink, yellow and brown  to black devitalised skin tissue , no active infection ,       B/l buttock moisture associated dermatitis with  erythema and denuded skin     -Off loading to prevent pressure sores and preventive measures to avoid aspiration   -Meropenem 1 gm iv q8h for  total of 7 days  4/16-4/22    #chronic hypotension   taper midodrine as tolerated     - CXR: possible basilar opacities  - covid/RSV, MSRA, negative; Bl cx with contaminant   - C/w Midodrine, wean off levophed, MAP 60, increase midodrine 15 mg TiD   - Scopolamine for secretions. aspiration precautions   - F/u bcx; fungitell (previously positive s/p caspo), Urine for strep pneumonia/legionella antigen  - F/u palliative eval   - fungitell improving     # B/l feet ulcers  - No abscess/cellulitis  - colonized with ESBL strain  -Off loading to prevent pressure sores     #seizure disorder cont keppra     #Down syndrome, Nonverbal bedbound, total care     # sp PEG tube 4/2/24  peg care       #Progress Note Handoff    Pending :    Disposition: snf   code status: DNR, DNI per palliative care note from previous admission
Impression and plan above have been altered and are my own.
57yo F w/ pmhx of Down's syndrome, cerebral palsy, seizure disorder presents from [facility] for respiratory distress. Admitted to SDU for severe sepsis and acute hypoxemic respiratory failure likely secondary to recurrent CAP.     # Acute hypoxemic respiratory failure   #Severe sepsis with septic shock poa  persistent fever, fu ID     #possible recurrent CAP   #chronic hypotension   - CXR: possible basilar opacities  - covid/RSV, MSRA, negative; Bl cx with contaminant   - C/w Midodrine 5mg  - target MAP 60 ( Patient always has BP on the softer side )   - blood cx 4/15: staph capitis  - blood cx 4/15 and 4/17: NGTD  - Scopolamine for secretions. aspiration precautions   - Palliative care following   - 4/22 and 4/23: spiked fever and WBC ;WBC downtrending (10 on 4/26)  - Continue Meropenem (4/15 - )  - F/u bcx 4/23 -> no growth to date  - CT chest angio w/ contrast 4/24: bilateral opacities   - currently on 4L facemask  - 4/27 AM temp 101  - 4/28 >> Tmax 102.2   - 4/29 >> Tmax 99.4  -  fungitell  >> nEG   -Blood cx >> NGTD 04/28  - Follow up ID    #Episodes of Vomiting:   - On PEG feed   - Aspiration risk ; C Xray done   - Can restart the PEG feeding for now     # B/l feet ulcers  - No abscess/cellulitis  - colonized with ESBL strain  - Off loading to prevent pressure sores   - wound care following     #seizure disorder  #Down syndrome  #Nonverbal bedbound  # PEG tube   - c/w home meds    #Progress Note Handoff    Pending :  ID followup  Family discussion: will need meeting with group home admin prior to return   Disposition: group home   code status: dnr, dni .
Patient is a 59y/o Female  w/ pmhx of Down's syndrome, cerebral palsy, seizure disorder presents from [facility] for respiratory distress. Admitted to SDU for severe sepsis and acute hypoxemic respiratory failure likely secondary to recurrent CAP.     # Acute hypoxemic respiratory failure   #Severe sepsis with septic shock poa- resolved   VRE bacteremia- resolved     Blood culture grew  VRE ,repeated blood cxs neg  - s/p TTE- no endocarditis , IV Dapto , IV Alicia x 1 week as per ID rec. ,  completed on 5/26    #possible recurrent pneumonia /suspected aspiration pna ( despite having peg )   #chronic hypotension   - covid/RSV, MSRA, negative; Bl cx with contaminant   - Hypotension C/w Midodrine   - target MAP 60 ( Patient always has BP on the softer side )   - blood cx 4/15: staph capitis,  blood cxs 4/15 and 4/17: NGTD  - CT chest angio w/ contrast 4/24: bilateral opacities   -  s/p  Meropenem  1g q8  x mutiple courses.        # rash, probably contact dermatitis, improving   hold chlorhexidine wash, added topical care , monitor     #Episodes of Vomiting, complicated by Aspiration   - recurrent aspiration risk is high for coexisting hiatal hernia   - On PEG feed  , now on lower volume, tolerating   - gastric emptying study, neg     # B/l feet ulcers  - colonized with ESBL strain  - Off loading to prevent pressure sores   - wound care following     #seizure disorder, cont meds   #Down syndrome  #Nonverbal bedbound  # PEG tube   - c/w home meds    Overall prognosis poor,     #Progress Note Handoff    Pending : Patient was medically optimized,  completed IV abx. tx, stable  for d/c to group home when bed is available   Disposition: group home   code status: DNR, DNI     Total time spent to complete patient's bedside assessment, review medical chart, discuss medical plan of care with covering medical team was more than 35 minutes with >50% of time spent face to face with patient, discussion with patient/family and/or coordination of care .
57yo F w/ pmhx of Down's syndrome, cerebral palsy, seizure disorder presents from [facility] for respiratory distress. Admitted to SDU for severe sepsis and acute hypoxemic respiratory failure likely secondary to recurrent CAP.     # Acute hypoxemic respiratory failure   #Severe sepsis with septic shock poa  persistent fever, fu ID   dw ID, Dr mcmillan recommends dc abx and repeat bc off abx,     #possible recurrent CAP   #chronic hypotension   - CXR: possible basilar opacities  - covid/RSV, MSRA, negative; Bl cx with contaminant   - C/w Midodrine 5mg  - target MAP 60 ( Patient always has BP on the softer side )   - blood cx 4/15: staph capitis  - blood cx 4/15 and 4/17: NGTD  - Scopolamine for secretions. aspiration precautions   - Palliative care following   - 4/22 and 4/23: spiked fever and WBC ;WBC downtrending (10 on 4/26)  - Continue Meropenem (4/15 - )  - F/u bcx 4/23 -> no growth to date  - CT chest angio w/ contrast 4/24: bilateral opacities   - currently on 4L facemask  - 4/27 AM temp 101  - 4/28 >> Tmax 102.2   - 4/29 >> Tmax 99.4  -  fungitell  >> nEG   -Blood cx >> NGTD 04/28  - Follow up ID    #Episodes of Vomiting:   - On PEG feed   - Aspiration risk ; C Xray done   - Can restart the PEG feeding for now     # B/l feet ulcers  - No abscess/cellulitis  - colonized with ESBL strain  - Off loading to prevent pressure sores   - wound care following     #seizure disorder  #Down syndrome  #Nonverbal bedbound  # PEG tube   - c/w home meds    #Progress Note Handoff    Pending :  ID followup, followup bc   Family discussion: will need meeting with group home admin prior to return   Disposition: group home   code status: dnr, dni . poor prognosis
57yo F with PMH of Down's syndrome, cerebral palsy, seizure disorder presents from [facility] for respiratory distress. Admitted to SDU for severe sepsis and acute hypoxemic respiratory failure likely secondary to recurrent CAP.     # Acute hypoxemic respiratory failure , suspected aspiration pna, vomited again over the weekend, now stable resume diet via peg, smaller volume   # Severe sepsis with septic shock (WBC 12.25, Tmax 104.4F, 84% RA; hypotensive initially SBP 70s + source)  # Chronic hypotension   # Metabolic encephalopathy  - CXR: possible basilar opacities  - covid/RSV, MSRA, negative; Bl cx with contaminant   taper midodrine slowly dc florinef if bp stable   - blood cx 4/15: staph capitis  - blood cx 4/15 and 4/17: NGTD  - c/w Alicia for total of 7 days  (4/16 - ) Day 6  - Scopolamine for secretions, aspiration precautions   - fungi-tell improving   repeat cxr today ( report pending)   recall ID , cont alicia  mrsa nasal swab neg on 4/16    # B/l feet ulcers  - no abscess/cellulitis  - colonized with ESBL strain  - off loading to prevent pressure sores   - wound care following     # Seizure disorder  # Down syndrome  # Nonverbal bedbound  # PEG tube   - c/w home meds, on keppra    #Progress Note Handoff    Pending :  id recall, repeat cultures,   Disposition: group home when stable   code status: dni, dnr
Patient is a 57y/o Female  w/ pmhx of Down's syndrome, cerebral palsy, seizure disorder presents from [facility] for respiratory distress. Admitted to SDU for severe sepsis and acute hypoxemic respiratory failure likely secondary to recurrent CAP.     # Acute hypoxemic respiratory failure   #Severe sepsis with septic shock poa,    VRE bacteremia,    Blood culture grew  VRE ,repeated blood cxs neg  - f/up TTE, IV Dapto x 1 week as per ID rec. monitor CK level      #possible recurrent pneumonia /suspected aspiration pna ( despite having peg )   #chronic hypotension   - covid/RSV, MSRA, negative; Bl cx with contaminant   - Hypotension C/w Midodrine   - target MAP 60 ( Patient always has BP on the softer side )   - blood cx 4/15: staph capitis,  blood cxs 4/15 and 4/17: NGTD  - CT chest angio w/ contrast 4/24: bilateral opacities   -  s/p  Meropenem  1g q8  x mutiple courses.        # rash, probably contact dermatitis, improving   hold chlorhexidine wash, added topical care , monitor     #Episodes of Vomiting, complicated by Aspiration   - recurrent aspiration risk is high for coexisting hiatal hernia   - On PEG feed  , now on lower volume, tolerating   - gastric emptying study, neg     # B/l feet ulcers  - colonized with ESBL strain  - Off loading to prevent pressure sores   - wound care following     #seizure disorder, cont meds   #Down syndrome  #Nonverbal bedbound  # PEG tube   - c/w home meds    Overall prognosis poor,     #Progress Note Handoff    Pending : Bacteremia /TTE , than d/c to group home  Disposition: group home   code status: DNR, DNI     Total time spent to complete patient's bedside assessment, review medical chart, discuss medical plan of care with covering medical team was more than 35 minutes with >50% of time spent face to face with patient, discussion with patient/family and/or coordination of care

## 2024-05-28 NOTE — PROGRESS NOTE ADULT - PROVIDER SPECIALTY LIST ADULT
Critical Care
Hospitalist
Internal Medicine
Pulmonology
Pulmonology
Critical Care
Hospitalist
Infectious Disease
Infectious Disease
Internal Medicine
Palliative Care
Hospitalist
Infectious Disease
Infectious Disease
Internal Medicine
Hospitalist
Infectious Disease
Internal Medicine
Hospitalist
Infectious Disease
Internal Medicine
Infectious Disease
Infectious Disease
Palliative Care
Infectious Disease
Infectious Disease

## 2024-05-29 ENCOUNTER — TRANSCRIPTION ENCOUNTER (OUTPATIENT)
Age: 59
End: 2024-05-29

## 2024-05-29 VITALS
TEMPERATURE: 99 F | HEART RATE: 95 BPM | SYSTOLIC BLOOD PRESSURE: 99 MMHG | RESPIRATION RATE: 17 BRPM | OXYGEN SATURATION: 96 % | DIASTOLIC BLOOD PRESSURE: 57 MMHG

## 2024-05-29 LAB
BASOPHILS # BLD AUTO: 0.06 K/UL — SIGNIFICANT CHANGE UP (ref 0–0.2)
BASOPHILS NFR BLD AUTO: 0.6 % — SIGNIFICANT CHANGE UP (ref 0–1)
EOSINOPHIL # BLD AUTO: 0.26 K/UL — SIGNIFICANT CHANGE UP (ref 0–0.7)
EOSINOPHIL NFR BLD AUTO: 2.7 % — SIGNIFICANT CHANGE UP (ref 0–8)
GLUCOSE BLDC GLUCOMTR-MCNC: 118 MG/DL — HIGH (ref 70–99)
GLUCOSE BLDC GLUCOMTR-MCNC: 141 MG/DL — HIGH (ref 70–99)
GLUCOSE BLDC GLUCOMTR-MCNC: 169 MG/DL — HIGH (ref 70–99)
HCT VFR BLD CALC: 29.4 % — LOW (ref 37–47)
HGB BLD-MCNC: 9.1 G/DL — LOW (ref 12–16)
IMM GRANULOCYTES NFR BLD AUTO: 0.6 % — HIGH (ref 0.1–0.3)
LYMPHOCYTES # BLD AUTO: 1.37 K/UL — SIGNIFICANT CHANGE UP (ref 1.2–3.4)
LYMPHOCYTES # BLD AUTO: 14.5 % — LOW (ref 20.5–51.1)
MAGNESIUM SERPL-MCNC: 2.3 MG/DL — SIGNIFICANT CHANGE UP (ref 1.8–2.4)
MCHC RBC-ENTMCNC: 29.5 PG — SIGNIFICANT CHANGE UP (ref 27–31)
MCHC RBC-ENTMCNC: 31 G/DL — LOW (ref 32–37)
MCV RBC AUTO: 95.5 FL — SIGNIFICANT CHANGE UP (ref 81–99)
MONOCYTES # BLD AUTO: 0.49 K/UL — SIGNIFICANT CHANGE UP (ref 0.1–0.6)
MONOCYTES NFR BLD AUTO: 5.2 % — SIGNIFICANT CHANGE UP (ref 1.7–9.3)
NEUTROPHILS # BLD AUTO: 7.23 K/UL — HIGH (ref 1.4–6.5)
NEUTROPHILS NFR BLD AUTO: 76.4 % — HIGH (ref 42.2–75.2)
NRBC # BLD: 0 /100 WBCS — SIGNIFICANT CHANGE UP (ref 0–0)
PLATELET # BLD AUTO: 434 K/UL — HIGH (ref 130–400)
PMV BLD: 9.6 FL — SIGNIFICANT CHANGE UP (ref 7.4–10.4)
RBC # BLD: 3.08 M/UL — LOW (ref 4.2–5.4)
RBC # FLD: 19.3 % — HIGH (ref 11.5–14.5)
WBC # BLD: 9.47 K/UL — SIGNIFICANT CHANGE UP (ref 4.8–10.8)
WBC # FLD AUTO: 9.47 K/UL — SIGNIFICANT CHANGE UP (ref 4.8–10.8)

## 2024-05-29 PROCEDURE — 99239 HOSP IP/OBS DSCHRG MGMT >30: CPT

## 2024-05-29 RX ADMIN — Medication 3 MILLILITER(S): at 14:33

## 2024-05-29 RX ADMIN — FAMOTIDINE 20 MILLIGRAM(S): 10 INJECTION INTRAVENOUS at 05:15

## 2024-05-29 RX ADMIN — MIDODRINE HYDROCHLORIDE 10 MILLIGRAM(S): 2.5 TABLET ORAL at 05:15

## 2024-05-29 RX ADMIN — FAMOTIDINE 20 MILLIGRAM(S): 10 INJECTION INTRAVENOUS at 17:41

## 2024-05-29 RX ADMIN — ENOXAPARIN SODIUM 30 MILLIGRAM(S): 100 INJECTION SUBCUTANEOUS at 13:16

## 2024-05-29 RX ADMIN — SCOPALAMINE 1 PATCH: 1 PATCH, EXTENDED RELEASE TRANSDERMAL at 05:44

## 2024-05-29 RX ADMIN — Medication 1 TABLET(S): at 13:16

## 2024-05-29 RX ADMIN — RALOXIFENE HYDROCHLORIDE 60 MILLIGRAM(S): 60 TABLET, COATED ORAL at 13:16

## 2024-05-29 RX ADMIN — Medication 1 APPLICATION(S): at 05:15

## 2024-05-29 RX ADMIN — MIDODRINE HYDROCHLORIDE 10 MILLIGRAM(S): 2.5 TABLET ORAL at 13:15

## 2024-05-29 RX ADMIN — Medication 3 MILLILITER(S): at 09:01

## 2024-05-29 RX ADMIN — Medication 1 APPLICATION(S): at 18:24

## 2024-05-29 RX ADMIN — GABAPENTIN 300 MILLIGRAM(S): 400 CAPSULE ORAL at 18:23

## 2024-05-29 RX ADMIN — GABAPENTIN 300 MILLIGRAM(S): 400 CAPSULE ORAL at 05:14

## 2024-05-29 RX ADMIN — LEVETIRACETAM 750 MILLIGRAM(S): 250 TABLET, FILM COATED ORAL at 05:15

## 2024-05-29 RX ADMIN — LEVETIRACETAM 750 MILLIGRAM(S): 250 TABLET, FILM COATED ORAL at 17:42

## 2024-05-29 RX ADMIN — Medication 1 DROP(S): at 18:23

## 2024-05-29 RX ADMIN — Medication 1 DROP(S): at 05:15

## 2024-05-29 NOTE — DISCHARGE NOTE NURSING/CASE MANAGEMENT/SOCIAL WORK - PATIENT PORTAL LINK FT
You can access the FollowMyHealth Patient Portal offered by [facility] by registering at the following website: [email or web address]. By joining [facility] FollowMyHealth portal, you will also be able to view your health information using other applications (apps) compatible with our system.

## 2024-05-31 ENCOUNTER — INPATIENT (INPATIENT)
Facility: HOSPITAL | Age: 59
LOS: 12 days | Discharge: HOME | DRG: 193 | End: 2024-06-13
Attending: STUDENT IN AN ORGANIZED HEALTH CARE EDUCATION/TRAINING PROGRAM | Admitting: STUDENT IN AN ORGANIZED HEALTH CARE EDUCATION/TRAINING PROGRAM
Payer: COMMERCIAL

## 2024-05-31 VITALS
DIASTOLIC BLOOD PRESSURE: 46 MMHG | HEIGHT: 55 IN | RESPIRATION RATE: 30 BRPM | HEART RATE: 141 BPM | OXYGEN SATURATION: 94 % | SYSTOLIC BLOOD PRESSURE: 85 MMHG

## 2024-05-31 DIAGNOSIS — J96.02 ACUTE RESPIRATORY FAILURE WITH HYPERCAPNIA: ICD-10-CM

## 2024-05-31 DIAGNOSIS — J18.9 PNEUMONIA, UNSPECIFIED ORGANISM: ICD-10-CM

## 2024-05-31 DIAGNOSIS — Z93.1 GASTROSTOMY STATUS: Chronic | ICD-10-CM

## 2024-05-31 DIAGNOSIS — Z98.890 OTHER SPECIFIED POSTPROCEDURAL STATES: Chronic | ICD-10-CM

## 2024-05-31 LAB
ALBUMIN SERPL ELPH-MCNC: 2.8 G/DL — LOW (ref 3.5–5.2)
ALP SERPL-CCNC: 89 U/L — SIGNIFICANT CHANGE UP (ref 30–115)
ALT FLD-CCNC: 18 U/L — SIGNIFICANT CHANGE UP (ref 0–41)
ANION GAP SERPL CALC-SCNC: 14 MMOL/L — SIGNIFICANT CHANGE UP (ref 7–14)
ANISOCYTOSIS BLD QL: SLIGHT — SIGNIFICANT CHANGE UP
APTT BLD: 32.7 SEC — SIGNIFICANT CHANGE UP (ref 27–39.2)
AST SERPL-CCNC: 61 U/L — HIGH (ref 0–41)
BASE EXCESS BLDV CALC-SCNC: 3.2 MMOL/L — HIGH (ref -2–3)
BASOPHILS # BLD AUTO: 0 K/UL — SIGNIFICANT CHANGE UP (ref 0–0.2)
BASOPHILS NFR BLD AUTO: 0 % — SIGNIFICANT CHANGE UP (ref 0–1)
BILIRUB SERPL-MCNC: 0.3 MG/DL — SIGNIFICANT CHANGE UP (ref 0.2–1.2)
BUN SERPL-MCNC: 11 MG/DL — SIGNIFICANT CHANGE UP (ref 10–20)
CA-I SERPL-SCNC: 1.19 MMOL/L — SIGNIFICANT CHANGE UP (ref 1.15–1.33)
CALCIUM SERPL-MCNC: 8.5 MG/DL — SIGNIFICANT CHANGE UP (ref 8.4–10.4)
CHLORIDE SERPL-SCNC: 97 MMOL/L — LOW (ref 98–110)
CO2 SERPL-SCNC: 25 MMOL/L — SIGNIFICANT CHANGE UP (ref 17–32)
CREAT SERPL-MCNC: 0.7 MG/DL — SIGNIFICANT CHANGE UP (ref 0.7–1.5)
DACRYOCYTES BLD QL SMEAR: SLIGHT — SIGNIFICANT CHANGE UP
EGFR: 101 ML/MIN/1.73M2 — SIGNIFICANT CHANGE UP
EOSINOPHIL # BLD AUTO: 0.3 K/UL — SIGNIFICANT CHANGE UP (ref 0–0.7)
EOSINOPHIL NFR BLD AUTO: 1.7 % — SIGNIFICANT CHANGE UP (ref 0–8)
FLUAV AG NPH QL: SIGNIFICANT CHANGE UP
FLUBV AG NPH QL: SIGNIFICANT CHANGE UP
GAS PNL BLDV: 134 MMOL/L — LOW (ref 136–145)
GAS PNL BLDV: SIGNIFICANT CHANGE UP
GAS PNL BLDV: SIGNIFICANT CHANGE UP
GLUCOSE SERPL-MCNC: 148 MG/DL — HIGH (ref 70–99)
HCO3 BLDV-SCNC: 30 MMOL/L — HIGH (ref 22–29)
HCT VFR BLD CALC: 30.7 % — LOW (ref 37–47)
HCT VFR BLDA CALC: 31 % — LOW (ref 34.5–46.5)
HGB BLD CALC-MCNC: 10.2 G/DL — LOW (ref 11.7–16.1)
HGB BLD-MCNC: 9.6 G/DL — LOW (ref 12–16)
INR BLD: 1.39 RATIO — HIGH (ref 0.65–1.3)
LACTATE BLDV-MCNC: 3.7 MMOL/L — HIGH (ref 0.5–2)
LACTATE SERPL-SCNC: 1.4 MMOL/L — SIGNIFICANT CHANGE UP (ref 0.7–2)
LIDOCAIN IGE QN: 12 U/L — SIGNIFICANT CHANGE UP (ref 7–60)
LYMPHOCYTES # BLD AUTO: 1.25 K/UL — SIGNIFICANT CHANGE UP (ref 1.2–3.4)
LYMPHOCYTES # BLD AUTO: 7 % — LOW (ref 20.5–51.1)
MANUAL SMEAR VERIFICATION: SIGNIFICANT CHANGE UP
MCHC RBC-ENTMCNC: 29.8 PG — SIGNIFICANT CHANGE UP (ref 27–31)
MCHC RBC-ENTMCNC: 31.3 G/DL — LOW (ref 32–37)
MCV RBC AUTO: 95.3 FL — SIGNIFICANT CHANGE UP (ref 81–99)
MICROCYTES BLD QL: SLIGHT — SIGNIFICANT CHANGE UP
MONOCYTES # BLD AUTO: 0.16 K/UL — SIGNIFICANT CHANGE UP (ref 0.1–0.6)
MONOCYTES NFR BLD AUTO: 0.9 % — LOW (ref 1.7–9.3)
NEUTROPHILS # BLD AUTO: 15.34 K/UL — HIGH (ref 1.4–6.5)
NEUTROPHILS NFR BLD AUTO: 78.3 % — HIGH (ref 42.2–75.2)
NEUTS BAND # BLD: 7.8 % — HIGH (ref 0–6)
NT-PROBNP SERPL-SCNC: 600 PG/ML — HIGH (ref 0–300)
OVALOCYTES BLD QL SMEAR: SLIGHT — SIGNIFICANT CHANGE UP
PCO2 BLDV: 57 MMHG — HIGH (ref 39–42)
PH BLDV: 7.33 — SIGNIFICANT CHANGE UP (ref 7.32–7.43)
PLAT MORPH BLD: NORMAL — SIGNIFICANT CHANGE UP
PLATELET # BLD AUTO: 392 K/UL — SIGNIFICANT CHANGE UP (ref 130–400)
PMV BLD: 10.4 FL — SIGNIFICANT CHANGE UP (ref 7.4–10.4)
PO2 BLDV: 26 MMHG — SIGNIFICANT CHANGE UP (ref 25–45)
POIKILOCYTOSIS BLD QL AUTO: SLIGHT — SIGNIFICANT CHANGE UP
POLYCHROMASIA BLD QL SMEAR: SIGNIFICANT CHANGE UP
POTASSIUM BLDV-SCNC: 4.5 MMOL/L — SIGNIFICANT CHANGE UP (ref 3.5–5.1)
POTASSIUM SERPL-MCNC: 5.3 MMOL/L — HIGH (ref 3.5–5)
POTASSIUM SERPL-SCNC: 5.3 MMOL/L — HIGH (ref 3.5–5)
PROT SERPL-MCNC: 6.8 G/DL — SIGNIFICANT CHANGE UP (ref 6–8)
PROTHROM AB SERPL-ACNC: 15.9 SEC — HIGH (ref 9.95–12.87)
RBC # BLD: 3.22 M/UL — LOW (ref 4.2–5.4)
RBC # FLD: 19.1 % — HIGH (ref 11.5–14.5)
RBC BLD AUTO: ABNORMAL
RSV RNA NPH QL NAA+NON-PROBE: SIGNIFICANT CHANGE UP
SAO2 % BLDV: 26.4 % — LOW (ref 67–88)
SARS-COV-2 RNA SPEC QL NAA+PROBE: SIGNIFICANT CHANGE UP
SODIUM SERPL-SCNC: 136 MMOL/L — SIGNIFICANT CHANGE UP (ref 135–146)
TROPONIN T, HIGH SENSITIVITY RESULT: 26 NG/L — HIGH (ref 6–13)
VARIANT LYMPHS # BLD: 4.3 % — SIGNIFICANT CHANGE UP (ref 0–5)
WBC # BLD: 17.82 K/UL — HIGH (ref 4.8–10.8)
WBC # FLD AUTO: 17.82 K/UL — HIGH (ref 4.8–10.8)

## 2024-05-31 PROCEDURE — 94660 CPAP INITIATION&MGMT: CPT

## 2024-05-31 PROCEDURE — 71045 X-RAY EXAM CHEST 1 VIEW: CPT

## 2024-05-31 PROCEDURE — 86900 BLOOD TYPING SEROLOGIC ABO: CPT

## 2024-05-31 PROCEDURE — 94760 N-INVAS EAR/PLS OXIMETRY 1: CPT

## 2024-05-31 PROCEDURE — 84295 ASSAY OF SERUM SODIUM: CPT

## 2024-05-31 PROCEDURE — 36415 COLL VENOUS BLD VENIPUNCTURE: CPT

## 2024-05-31 PROCEDURE — 86901 BLOOD TYPING SEROLOGIC RH(D): CPT

## 2024-05-31 PROCEDURE — 80053 COMPREHEN METABOLIC PANEL: CPT

## 2024-05-31 PROCEDURE — 0225U NFCT DS DNA&RNA 21 SARSCOV2: CPT

## 2024-05-31 PROCEDURE — 85014 HEMATOCRIT: CPT

## 2024-05-31 PROCEDURE — 71045 X-RAY EXAM CHEST 1 VIEW: CPT | Mod: 26

## 2024-05-31 PROCEDURE — 82803 BLOOD GASES ANY COMBINATION: CPT

## 2024-05-31 PROCEDURE — 83036 HEMOGLOBIN GLYCOSYLATED A1C: CPT

## 2024-05-31 PROCEDURE — 81001 URINALYSIS AUTO W/SCOPE: CPT

## 2024-05-31 PROCEDURE — 74177 CT ABD & PELVIS W/CONTRAST: CPT | Mod: 26,MC

## 2024-05-31 PROCEDURE — 84484 ASSAY OF TROPONIN QUANT: CPT

## 2024-05-31 PROCEDURE — 86850 RBC ANTIBODY SCREEN: CPT

## 2024-05-31 PROCEDURE — 80177 DRUG SCRN QUAN LEVETIRACETAM: CPT

## 2024-05-31 PROCEDURE — 84100 ASSAY OF PHOSPHORUS: CPT

## 2024-05-31 PROCEDURE — 84132 ASSAY OF SERUM POTASSIUM: CPT

## 2024-05-31 PROCEDURE — 80048 BASIC METABOLIC PNL TOTAL CA: CPT

## 2024-05-31 PROCEDURE — 82330 ASSAY OF CALCIUM: CPT

## 2024-05-31 PROCEDURE — 85025 COMPLETE CBC W/AUTO DIFF WBC: CPT

## 2024-05-31 PROCEDURE — 80061 LIPID PANEL: CPT

## 2024-05-31 PROCEDURE — 87641 MR-STAPH DNA AMP PROBE: CPT

## 2024-05-31 PROCEDURE — 93005 ELECTROCARDIOGRAM TRACING: CPT

## 2024-05-31 PROCEDURE — 82962 GLUCOSE BLOOD TEST: CPT

## 2024-05-31 PROCEDURE — 99291 CRITICAL CARE FIRST HOUR: CPT | Mod: 25

## 2024-05-31 PROCEDURE — 82550 ASSAY OF CK (CPK): CPT

## 2024-05-31 PROCEDURE — 87086 URINE CULTURE/COLONY COUNT: CPT

## 2024-05-31 PROCEDURE — 85018 HEMOGLOBIN: CPT

## 2024-05-31 PROCEDURE — 36556 INSERT NON-TUNNEL CV CATH: CPT | Mod: RT

## 2024-05-31 PROCEDURE — 84145 PROCALCITONIN (PCT): CPT

## 2024-05-31 PROCEDURE — 99292 CRITICAL CARE ADDL 30 MIN: CPT | Mod: 25

## 2024-05-31 PROCEDURE — 93010 ELECTROCARDIOGRAM REPORT: CPT

## 2024-05-31 PROCEDURE — 83605 ASSAY OF LACTIC ACID: CPT

## 2024-05-31 PROCEDURE — 85027 COMPLETE CBC AUTOMATED: CPT

## 2024-05-31 PROCEDURE — 94640 AIRWAY INHALATION TREATMENT: CPT

## 2024-05-31 PROCEDURE — 83735 ASSAY OF MAGNESIUM: CPT

## 2024-05-31 PROCEDURE — 87640 STAPH A DNA AMP PROBE: CPT

## 2024-05-31 RX ORDER — RALOXIFENE HYDROCHLORIDE 60 MG/1
60 TABLET, COATED ORAL DAILY
Refills: 0 | Status: DISCONTINUED | OUTPATIENT
Start: 2024-05-31 | End: 2024-06-13

## 2024-05-31 RX ORDER — MIDODRINE HYDROCHLORIDE 2.5 MG/1
10 TABLET ORAL ONCE
Refills: 0 | Status: DISCONTINUED | OUTPATIENT
Start: 2024-05-31 | End: 2024-05-31

## 2024-05-31 RX ORDER — CEFEPIME 1 G/1
INJECTION, POWDER, FOR SOLUTION INTRAMUSCULAR; INTRAVENOUS
Refills: 0 | Status: DISCONTINUED | OUTPATIENT
Start: 2024-05-31 | End: 2024-05-31

## 2024-05-31 RX ORDER — LEVETIRACETAM 250 MG/1
750 TABLET, FILM COATED ORAL
Refills: 0 | Status: DISCONTINUED | OUTPATIENT
Start: 2024-05-31 | End: 2024-06-13

## 2024-05-31 RX ORDER — TIOTROPIUM BROMIDE 18 UG/1
2 CAPSULE ORAL; RESPIRATORY (INHALATION) DAILY
Refills: 0 | Status: DISCONTINUED | OUTPATIENT
Start: 2024-05-31 | End: 2024-06-01

## 2024-05-31 RX ORDER — SODIUM CHLORIDE 9 MG/ML
1000 INJECTION, SOLUTION INTRAVENOUS ONCE
Refills: 0 | Status: COMPLETED | OUTPATIENT
Start: 2024-05-31 | End: 2024-05-31

## 2024-05-31 RX ORDER — GABAPENTIN 400 MG/1
300 CAPSULE ORAL EVERY 12 HOURS
Refills: 0 | Status: DISCONTINUED | OUTPATIENT
Start: 2024-05-31 | End: 2024-06-11

## 2024-05-31 RX ORDER — ACETAMINOPHEN 500 MG
650 TABLET ORAL EVERY 6 HOURS
Refills: 0 | Status: DISCONTINUED | OUTPATIENT
Start: 2024-05-31 | End: 2024-06-13

## 2024-05-31 RX ORDER — DAPTOMYCIN 500 MG/10ML
400 INJECTION, POWDER, LYOPHILIZED, FOR SOLUTION INTRAVENOUS ONCE
Refills: 0 | Status: COMPLETED | OUTPATIENT
Start: 2024-05-31 | End: 2024-05-31

## 2024-05-31 RX ORDER — CEFEPIME 1 G/1
2000 INJECTION, POWDER, FOR SOLUTION INTRAMUSCULAR; INTRAVENOUS ONCE
Refills: 0 | Status: DISCONTINUED | OUTPATIENT
Start: 2024-05-31 | End: 2024-05-31

## 2024-05-31 RX ORDER — FAMOTIDINE 10 MG/ML
40 INJECTION INTRAVENOUS
Refills: 0 | Status: DISCONTINUED | OUTPATIENT
Start: 2024-05-31 | End: 2024-06-01

## 2024-05-31 RX ORDER — RALOXIFENE HYDROCHLORIDE 60 MG/1
1 TABLET, COATED ORAL
Qty: 0 | Refills: 0 | DISCHARGE

## 2024-05-31 RX ORDER — LANOLIN ALCOHOL/MO/W.PET/CERES
3 CREAM (GRAM) TOPICAL AT BEDTIME
Refills: 0 | Status: DISCONTINUED | OUTPATIENT
Start: 2024-05-31 | End: 2024-06-01

## 2024-05-31 RX ORDER — MEROPENEM 1 G/30ML
1000 INJECTION INTRAVENOUS ONCE
Refills: 0 | Status: COMPLETED | OUTPATIENT
Start: 2024-05-31 | End: 2024-05-31

## 2024-05-31 RX ORDER — MIDODRINE HYDROCHLORIDE 2.5 MG/1
10 TABLET ORAL ONCE
Refills: 0 | Status: COMPLETED | OUTPATIENT
Start: 2024-05-31 | End: 2024-05-31

## 2024-05-31 RX ORDER — LANOLIN ALCOHOL/MO/W.PET/CERES
1 CREAM (GRAM) TOPICAL
Qty: 0 | Refills: 0 | DISCHARGE

## 2024-05-31 RX ORDER — KETOROLAC TROMETHAMINE 30 MG/ML
15 SYRINGE (ML) INJECTION ONCE
Refills: 0 | Status: DISCONTINUED | OUTPATIENT
Start: 2024-05-31 | End: 2024-05-31

## 2024-05-31 RX ORDER — PANTOPRAZOLE SODIUM 20 MG/1
40 TABLET, DELAYED RELEASE ORAL
Refills: 0 | Status: DISCONTINUED | OUTPATIENT
Start: 2024-05-31 | End: 2024-06-01

## 2024-05-31 RX ORDER — VANCOMYCIN HCL 1 G
1000 VIAL (EA) INTRAVENOUS ONCE
Refills: 0 | Status: DISCONTINUED | OUTPATIENT
Start: 2024-05-31 | End: 2024-05-31

## 2024-05-31 RX ORDER — ALBUTEROL 90 UG/1
2 AEROSOL, METERED ORAL EVERY 6 HOURS
Refills: 0 | Status: DISCONTINUED | OUTPATIENT
Start: 2024-05-31 | End: 2024-06-01

## 2024-05-31 RX ORDER — ACETAMINOPHEN 500 MG
650 TABLET ORAL ONCE
Refills: 0 | Status: COMPLETED | OUTPATIENT
Start: 2024-05-31 | End: 2024-05-31

## 2024-05-31 RX ORDER — NOREPINEPHRINE BITARTRATE/D5W 8 MG/250ML
0.05 PLASTIC BAG, INJECTION (ML) INTRAVENOUS
Qty: 8 | Refills: 0 | Status: DISCONTINUED | OUTPATIENT
Start: 2024-05-31 | End: 2024-06-08

## 2024-05-31 RX ORDER — SODIUM CHLORIDE 9 MG/ML
2000 INJECTION, SOLUTION INTRAVENOUS ONCE
Refills: 0 | Status: COMPLETED | OUTPATIENT
Start: 2024-05-31 | End: 2024-05-31

## 2024-05-31 RX ADMIN — SODIUM CHLORIDE 1000 MILLILITER(S): 9 INJECTION, SOLUTION INTRAVENOUS at 21:05

## 2024-05-31 RX ADMIN — SODIUM CHLORIDE 2000 MILLILITER(S): 9 INJECTION, SOLUTION INTRAVENOUS at 18:51

## 2024-05-31 RX ADMIN — Medication 650 MILLIGRAM(S): at 18:51

## 2024-05-31 RX ADMIN — MEROPENEM 100 MILLIGRAM(S): 1 INJECTION INTRAVENOUS at 19:27

## 2024-05-31 RX ADMIN — Medication 15 MILLIGRAM(S): at 21:05

## 2024-05-31 RX ADMIN — Medication 5.16 MICROGRAM(S)/KG/MIN: at 21:05

## 2024-05-31 RX ADMIN — SODIUM CHLORIDE 2000 MILLILITER(S): 9 INJECTION, SOLUTION INTRAVENOUS at 20:58

## 2024-05-31 RX ADMIN — DAPTOMYCIN 116 MILLIGRAM(S): 500 INJECTION, POWDER, LYOPHILIZED, FOR SOLUTION INTRAVENOUS at 21:04

## 2024-05-31 NOTE — H&P ADULT - ASSESSMENT
59 yo F with hx of down syndrome, anemia, cerebral palsy, seizure, osteoporosis, hypotension on midodrine, nonverbal at baseline who was BIBEMS from [facility] for respiratory distress. Per EMS, pt was satting 74% on 4L NC. Pt was admitted 4/16-5/29 for severe sepsis and respiratory failure 2/2 recurrent CAP. During admission, blood cx grew staph capitis and VRE which cleared after daptomycin and meropenem completed on 5/26. BRIANA was negative for endocarditis. Pt also had recurrent vomiting and aspiration despite PEG tube. Further hx limited as pt is nonverbal.     IMPRESSION:  Acute Hypoxemic Hypercapnic Respiratory Failure  Septic Shock   Pneumonia- possibly aspiration  Lactic Acidosis- Resolved  Normocytic Anemia  Hx of Down Syndrome  Hx of Cerebral Palsy/ Nonverbal at baseline  Hx of Seizures  Hx of OP      PLAN:    CNS: keep off sedatives    HEENT: Oral Care    PULMONARY: HOB @ 45, aspiration precautions BIPAP as needed, try to wean off, Keep spo2 92 TO 96%. chest PT. Duonebs q6hrs and PRN.    CARDIOVASCULAR: Recent echo with normal EF, goal directed fluid resuscitation, keep LR at 75 cc/hr. Trend trop    GI: GI prophylaxis.  PEG feeding tomorrow    RENAL: Strict In/out, keep euvolemic     INFECTIOUS DISEASE: Follow up blood culture. Sputum culture. IV abx: Meropenem, vancomycin. Urine strep pneumo & legionella. Full RVP panel. MRSA nares. ID evaluation    HEMATOLOGICAL:  DVT prophylaxis.    ENDOCRINE:  Follow up FS.  Insulin protocol if needed.    MUSCULOSKELETAL: Bedrest    TLC on 5/31    Dispo: MICU       57 yo F with hx of down syndrome, anemia, cerebral palsy, seizure, osteoporosis, hypotension on midodrine, nonverbal at baseline who was BIBEMS from [facility] for respiratory distress. Per EMS, pt was satting 74% on 4L NC. Pt was admitted 4/16-5/29 for severe sepsis and respiratory failure 2/2 recurrent CAP. During admission, blood cx grew staph capitis and VRE which cleared after daptomycin and meropenem completed on 5/26. BRIANA was negative for endocarditis. Pt also had recurrent vomiting and aspiration despite PEG tube. Further hx limited as pt is nonverbal.     IMPRESSION:  Acute Hypoxemic Hypercapnic Respiratory Failure  Septic Shock   Pneumonia- possibly aspiration  Lactic Acidosis- Resolved  Normocytic Anemia  Hx of Down Syndrome  Hx of Cerebral Palsy/ Nonverbal at baseline  Hx of Seizures  Hx of OP      PLAN: see attestation note     CNS: keep off sedatives    HEENT: Oral Care    PULMONARY: HOB @ 45, aspiration precautions BIPAP as needed, try to wean off, Keep spo2 92 TO 96%. chest PT. Duonebs q6hrs and PRN.    CARDIOVASCULAR: Recent echo with normal EF, goal directed fluid resuscitation, keep LR at 75 cc/hr. Trend trop    GI: GI prophylaxis.  PEG feeding tomorrow    RENAL: Strict In/out, keep euvolemic     INFECTIOUS DISEASE: Follow up blood culture. Sputum culture. IV abx: Meropenem, vancomycin. Urine strep pneumo & legionella. Full RVP panel. MRSA nares. ID evaluation    HEMATOLOGICAL:  DVT prophylaxis.    ENDOCRINE:  Follow up FS.  Insulin protocol if needed.    MUSCULOSKELETAL: Bedrest    TLC on 5/31    Dispo: MICU

## 2024-05-31 NOTE — ED PROVIDER NOTE - OBJECTIVE STATEMENT
59 yo F with hx of down syndrome, anemia, cerebral palsy, seizure, osteoporosis, hypotension on midodrine, nonverbal at baseline who was BIBEMS from [facility] for respiratory distress. Per EMS, pt was satting 74% on 4L NC. Per chart review, pt was admitted 4/16-5/29 for severe sepsis and respiratory failure 2/2 recurrent CAP. During admission, blood cx grew staph capitis and VRE which cleared after daptomycin and meropenem completed on 5/26. BRIANA was negative for endocarditis. Pt also had recurrent vomiting and aspiration despite PEG tube. Further hx limited as pt is nonverbal. 57 yo F with hx of down syndrome, anemia, cerebral palsy, seizure, osteoporosis, hypotension on midodrine, nonverbal at baseline who was BIBEMS from [facility] for respiratory distress. Per EMS, pt was satting 74% on 4L NC. Per chart review, pt was admitted 4/16-5/29 for severe sepsis and respiratory failure 2/2 recurrent CAP. During admission, blood cx grew staph capitis and VRE which cleared after daptomycin and meropenem completed on 5/26. BRIANA was negative for endocarditis. Pt also had recurrent vomiting and aspiration despite PEG tube. Further hx limited as pt is nonverbal. DNR/DNI.    PMD Dr. Serrano

## 2024-05-31 NOTE — H&P ADULT - HISTORY OF PRESENT ILLNESS
57 yo F with hx of down syndrome, anemia, cerebral palsy, seizure, osteoporosis, hypotension on midodrine, nonverbal at baseline who was BIBEMS from [facility] for respiratory distress. Per EMS, pt was satting 74% on 4L NC. Pt was admitted 4/16-5/29 for severe sepsis and respiratory failure 2/2 recurrent CAP. During admission, blood cx grew staph capitis and VRE which cleared after daptomycin and meropenem completed on 5/26. BRIANA was negative for endocarditis. Pt also had recurrent vomiting and aspiration despite PEG tube. Further hx limited as pt is nonverbal.     GOC as follows:  [facility] decided on DNR/DNI per their documentation letter. (GOC done on 2/29/2024)    In the ED, patient was given dapto and danna as per previous cultures. Patient was given 3 L of IV fluids and then started on levophed. Labs showed WBC of 17 with left shift. Lactate 3.7->1.4. ABG shows mild respiratory acidosis. BIPAP applied and patient improved. CT abdomen was done < from: CT Abdomen and Pelvis w/ IV Cont (05.31.24 @ 20:26) >  IMPRESSION:    Slight interval increase bibasilar consolidative opacities, consistent   with pneumonia in the appropriate clinical setting.    No evidence of acute abdominal pathology.    < end of copied text >

## 2024-05-31 NOTE — ED PROVIDER NOTE - NSICDXPASTMEDICALHX_GEN_ALL_CORE_FT
PAST MEDICAL HISTORY:  Cerebral palsy     Down syndrome     Mild anemia     Neuropathy     Nonverbal     Osteoporosis     Seizure      PAST MEDICAL HISTORY:  Cerebral palsy     Down syndrome     Hypotension on midodrine    Mild anemia     Neuropathy     Nonverbal     Osteoporosis     Seizure

## 2024-05-31 NOTE — H&P ADULT - NSHPPHYSICALEXAM_GEN_ALL_CORE
GA: alert oriented x0   Heart: normal s1 s2  Lungs: crackles in both bases, more on right  Abdomen: mildly tender, soft  LE: no edema

## 2024-05-31 NOTE — H&P ADULT - ATTENDING COMMENTS
patient seen and examined agree above note   aspiration precaution   ID consult   continue abx   nasal mrsa   procal     NIV 4 on and 4 off at night and as needed   keep pox >92%   peg feeding   follow cx   on levo 0.12   follow h/h   follow lytes   continue IV fluid

## 2024-05-31 NOTE — ED PROVIDER NOTE - NSICDXPASTSURGICALHX_GEN_ALL_CORE_FT
PAST SURGICAL HISTORY:  S/P debridement of R hip on 3/2/21    S/P percutaneous endoscopic gastrostomy (PEG) tube placement

## 2024-05-31 NOTE — ED PROVIDER NOTE - DIFFERENTIAL DIAGNOSIS
Differential Diagnosis septic shock, intra abd infection, ACS, CHF exac, ptx, pneumomediastinum, pneumonia, viral URI, pericarditis, myocarditis, pleural effusion

## 2024-05-31 NOTE — ED PROVIDER NOTE - PROGRESS NOTE DETAILS
TC: Pt here for respiratory distress in setting of recent admission for septic shock due to recurrent aspiration pneumonia and bacteremia. Here in ED, rectal temp 104.1, HR 150s, BP 80s/40s, satting 95% on 15L NRB. Pt unable to provide any hx but appears dry on exam and abd diffusely tender. Sepsis suspected at 16:31. Given 30cc/kg IVF of ideal body weight. Ordered daptomycin and meropenem based on prior ID recs. Plan for labs, ekg, imaging r/o septic shock, intra abd infection, ACS, CHF exac, ptx, pneumomediastinum, pneumonia, viral URI, pericarditis, myocarditis, pleural effusion. TC: BP improved to 120s/70s with IVF. Labs notable for leukocytosis 17, stable anemia, mildly elevated trop, lactate 3.7 with repeat ordered. Cxr with stable RLL pneumonia. Pt taken to CT. TC: Pt back from CT and desatting to low 80s on 15L NRB. Placed 6L NC in addition to 15L NRB but sats not going above 86%. HR improved to 130s. BP dropped back to 80s/60s with BP cuff on calf as cuff is not reading on any other extremity. Pt not wheezing or vomiting, has no rash or oropharyngeal swelling concerning for anaphylaxis. CT shows aspiration pneumonia vs small pleural effusions on my read, does not appear overtly fluid overloaded. Still febrile. Ordered 3rd liter of IVF. Will start peripheral pressors and place on bipap. TC: Sats improved to 94% on bipap, BP 90s/60s with push dose phenylephrine 1cc while awaiting levophed. Will do POCUS and place CVC.

## 2024-05-31 NOTE — H&P ADULT - NSICDXPASTMEDICALHX_GEN_ALL_CORE_FT
PAST MEDICAL HISTORY:  Cerebral palsy     Down syndrome     Hypotension on midodrine    Mild anemia     Neuropathy     Nonverbal     Osteoporosis     Seizure

## 2024-05-31 NOTE — CHART NOTE - NSCHARTNOTEFT_GEN_A_CORE
patient is a 59 y/o female with a hx of recurrent aspiration pneumonia presents with SOB. found to be in septic shock with pneuomnia as a probable source. pt is on 0.11 of levophed after 30c per kilo fluid resuscitation at the time of admission. admitted to MICU for further management    IMPRESSION:  Septic shock  Acute hypoxic respiratory failure  Aspiration pneumonia, recurrent  Sepsis POA  Feet pressure ulcers w/ HO ESBL strain in feet wounds  HO Oropharyngeal dysphagia  HO Trisomy 21  HO cerebral palsy w seizure disorder    PLAN:    CNS: Avoid CNS depressants. continue with home keppra     HEENT: Oral care    PULMONARY:  HOB @ 45 degrees.  Aspiration precaution. Wean off BiPAP as tolerated Keep spo2 92 TO 96%. chest PT. Duonebs q6hrs and PRN.    CARDIOVASCULAR: Wean off levophed as tolerated. Keep MAP >60. Keep even fluid balance avoid fluid overload. trend troponin until peak/fall    GI: GI prophylaxis.  NPO speech and swallow, likely needs ng tube. Bowel regimen. GI eval for peg tube once sepsis resolves    RENAL:  Follow up lytes.  Correct as needed    INFECTIOUS DISEASE: Follow up blood culture. Sputum culture. IV abx: Meropenem. Urine strep pneumo & legionella. Full RVP panel. MRSA nares. ID evaluation    HEMATOLOGICAL:  DVT prophylaxis.    ENDOCRINE:  Follow up FS.  Insulin protocol if needed.    MUSCULOSKELETAL: Wound care eval    LINES/DRAINS:  PIV. CVC 5/31    ADVANCED DIRECTIVES:  DNR DNI    DISPO: MICU patient is a 59 y/o female with a hx of recurrent aspiration pneumonia presents with SOB. found to be in septic shock with pneumonia as a probable source. pt is on 0.11 of levophed after 30c per kilo fluid resuscitation at the time of admission. admitted to MICU for further management    IMPRESSION:  Septic shock  Acute hypoxic respiratory failure  Aspiration pneumonia, recurrent  Feet pressure ulcers w/ HO ESBL strain in feet wounds  HO Oropharyngeal dysphagia  HO Trisomy 21  HO cerebral palsy w seizure disorder    PLAN:    CNS: Avoid CNS depressants. continue with home keppra     HEENT: Oral care    PULMONARY:  HOB @ 45 degrees.  Aspiration precaution. Wean off BiPAP as tolerated Keep spo2 92 TO 96%. chest PT. Duonebs q6hrs and PRN.    CARDIOVASCULAR: Wean off levophed as tolerated. Keep MAP >60. Keep even fluid balance avoid fluid overload. trend troponin until peak/fall    GI: GI prophylaxis.  NPO speech and swallow, likely needs ng tube. Bowel regimen. GI eval for peg tube once sepsis resolves    RENAL:  Follow up lytes.  Correct as needed    INFECTIOUS DISEASE: Follow up blood culture. Sputum culture. IV abx: Meropenem. Urine strep pneumo & legionella. Full RVP panel. MRSA nares. ID evaluation    HEMATOLOGICAL:  DVT prophylaxis.    ENDOCRINE:  Follow up FS.  Insulin protocol if needed.    MUSCULOSKELETAL: Wound care eval    LINES/DRAINS:  PIV. CVC 5/31    ADVANCED DIRECTIVES:  DNR DNI    DISPO: MICU patient is a 57 y/o female with a hx of recurrent aspiration pneumonia presents with SOB. found to be in septic shock with pneumonia as a probable source. pt is on 0.11 of levophed after 30c per kilo fluid resuscitation at the time of admission. admitted to MICU for further management    IMPRESSION:  Septic shock  Acute hypoxic respiratory failure  Aspiration pneumonia, recurrent  Feet pressure ulcers w/ HO ESBL strain in feet wounds  HO Oropharyngeal dysphagia  HO Trisomy 21  HO cerebral palsy w seizure disorder    PLAN:    CNS: Avoid CNS depressants. continue with home keppra     HEENT: Oral care    PULMONARY:  HOB @ 45 degrees.  Aspiration precaution. Wean off BiPAP as tolerated Keep spo2 92 TO 96%. chest PT. Duonebs q6hrs and PRN.    CARDIOVASCULAR: Wean off levophed as tolerated. Keep MAP >60. Keep even fluid balance avoid fluid overload. trend troponin until peak/fall    GI: GI prophylaxis.  NPO speech and swallow, likely needs ng tube. Bowel regimen. GI eval for peg tube once sepsis resolves    RENAL:  Follow up lytes.  Correct as needed    INFECTIOUS DISEASE: Follow up blood culture. Sputum culture. IV abx: Meropenem, vancomycin. Urine strep pneumo & legionella. Full RVP panel. MRSA nares. ID evaluation    HEMATOLOGICAL:  DVT prophylaxis.    ENDOCRINE:  Follow up FS.  Insulin protocol if needed.    MUSCULOSKELETAL: Wound care eval    LINES/DRAINS:  PIV. CVC 5/31    ADVANCED DIRECTIVES:  DNR DNI    DISPO: MICU

## 2024-05-31 NOTE — ED PROVIDER NOTE - CLINICAL SUMMARY MEDICAL DECISION MAKING FREE TEXT BOX
Pt here for respiratory distress in setting of recent admission for septic shock due to recurrent aspiration pneumonia and bacteremia. Here in ED, rectal temp 104.1, HR 150s, BP 80s/40s, satting 95% on 15L NRB. Pt unable to provide any hx but appears dry on exam and abd diffusely tender. Sepsis suspected at 16:31. Given 30cc/kg IVF of ideal body weight. Ordered daptomycin and meropenem based on prior ID recs. Plan for labs, ekg, imaging r/o septic shock, intra abd infection, ACS, CHF exac, ptx, pneumomediastinum, pneumonia, viral URI, pericarditis, myocarditis, pleural effusion. BP improved to 120s/70s with IVF. Labs notable for leukocytosis 17, stable anemia, mildly elevated trop, lactate 3.7 >1.4. Cxr with stable RLL pneumonia. After pt came back from CT, she desatting to low 80s on 15L NRB. Placed 6L NC in addition to 15L NRB but sats not going above 86%. HR improved to 130s. BP dropped back to 80s/60s with BP cuff on calf as cuff is not reading on any other extremity. Pt not wheezing or vomiting, has no rash or oropharyngeal swelling concerning for anaphylaxis. CT showed increase in bilateral pneumonia, does not appear overtly fluid overloaded. Still febrile. Given 3rd liter of IVF but still hypotensive so started peripheral pressors and placed on bipap. Sats improved to 94% on bipap, BP 90s/60s on levophed. POCUS with scant B lines, not fluid overloaded. Will place CVC. Spoke with ICU who accepts to ICU. Pt requires admission for IV abx given risk for worsening sepsis, bacteremia, septic shock, etc if not closely monitored and tx'ed. Also requires admission for supplemental O2 given risk for hypoxemia, respiratory failure, etc if not closely monitored and tx'ed.

## 2024-05-31 NOTE — ED PROVIDER NOTE - PHYSICAL EXAMINATION
CONSTITUTIONAL: cachectic, toxic appearing  SKIN: warm, dry, no rash, cap refill < 2 seconds  HEENT: normocephalic, atraumatic, no conjunctival erythema, dry mucous membranes, patent airway  NECK: supple  CV:  tachycardic rate, regular rhythm, 2+ radial pulses bilaterally  RESP: no wheezes, no rales, no rhonchi, normal work of breathing  ABD: soft, diffuse tenderness, nondistended, no rebound, no guarding, PEG in place clean/dry/intact  MSK: no cyanosis, no edema  NEURO: awake, nonverbal, does not track or follow commands  PSYCH: unable to assess

## 2024-05-31 NOTE — ED PROVIDER NOTE - CARE PLAN
Principal Discharge DX:	Septic shock  Secondary Diagnosis:	Acute hypoxemic respiratory failure   1 Principal Discharge DX:	Septic shock  Secondary Diagnosis:	Acute hypoxemic respiratory failure  Secondary Diagnosis:	Chronic anemia  Secondary Diagnosis:	Elevated troponin level  Secondary Diagnosis:	Pneumonia

## 2024-06-01 DIAGNOSIS — R09.89 OTHER SPECIFIED SYMPTOMS AND SIGNS INVOLVING THE CIRCULATORY AND RESPIRATORY SYSTEMS: ICD-10-CM

## 2024-06-01 LAB
A1C WITH ESTIMATED AVERAGE GLUCOSE RESULT: 5.6 % — SIGNIFICANT CHANGE UP (ref 4–5.6)
ALBUMIN SERPL ELPH-MCNC: 2.5 G/DL — LOW (ref 3.5–5.2)
ALP SERPL-CCNC: 79 U/L — SIGNIFICANT CHANGE UP (ref 30–115)
ALT FLD-CCNC: 11 U/L — SIGNIFICANT CHANGE UP (ref 0–41)
ANION GAP SERPL CALC-SCNC: 9 MMOL/L — SIGNIFICANT CHANGE UP (ref 7–14)
AST SERPL-CCNC: 12 U/L — SIGNIFICANT CHANGE UP (ref 0–41)
BASOPHILS # BLD AUTO: 0.05 K/UL — SIGNIFICANT CHANGE UP (ref 0–0.2)
BASOPHILS NFR BLD AUTO: 0.3 % — SIGNIFICANT CHANGE UP (ref 0–1)
BILIRUB SERPL-MCNC: 0.2 MG/DL — SIGNIFICANT CHANGE UP (ref 0.2–1.2)
BUN SERPL-MCNC: 8 MG/DL — LOW (ref 10–20)
CALCIUM SERPL-MCNC: 8.5 MG/DL — SIGNIFICANT CHANGE UP (ref 8.4–10.5)
CHLORIDE SERPL-SCNC: 105 MMOL/L — SIGNIFICANT CHANGE UP (ref 98–110)
CHOLEST SERPL-MCNC: 103 MG/DL — SIGNIFICANT CHANGE UP
CK SERPL-CCNC: 32 U/L — SIGNIFICANT CHANGE UP (ref 0–225)
CO2 SERPL-SCNC: 27 MMOL/L — SIGNIFICANT CHANGE UP (ref 17–32)
CREAT SERPL-MCNC: <0.5 MG/DL — LOW (ref 0.7–1.5)
EGFR: 115 ML/MIN/1.73M2 — SIGNIFICANT CHANGE UP
EOSINOPHIL # BLD AUTO: 0 K/UL — SIGNIFICANT CHANGE UP (ref 0–0.7)
EOSINOPHIL NFR BLD AUTO: 0 % — SIGNIFICANT CHANGE UP (ref 0–8)
ESTIMATED AVERAGE GLUCOSE: 114 MG/DL — SIGNIFICANT CHANGE UP (ref 68–114)
GAS PNL BLDA: SIGNIFICANT CHANGE UP
GLUCOSE BLDC GLUCOMTR-MCNC: 136 MG/DL — HIGH (ref 70–99)
GLUCOSE BLDC GLUCOMTR-MCNC: 158 MG/DL — HIGH (ref 70–99)
GLUCOSE BLDC GLUCOMTR-MCNC: 201 MG/DL — HIGH (ref 70–99)
GLUCOSE SERPL-MCNC: 173 MG/DL — HIGH (ref 70–99)
HCT VFR BLD CALC: 27.1 % — LOW (ref 37–47)
HDLC SERPL-MCNC: 31 MG/DL — LOW
HGB BLD-MCNC: 8.3 G/DL — LOW (ref 12–16)
IMM GRANULOCYTES NFR BLD AUTO: 0.6 % — HIGH (ref 0.1–0.3)
LIPID PNL WITH DIRECT LDL SERPL: 54 MG/DL — SIGNIFICANT CHANGE UP
LYMPHOCYTES # BLD AUTO: 1.01 K/UL — LOW (ref 1.2–3.4)
LYMPHOCYTES # BLD AUTO: 6.5 % — LOW (ref 20.5–51.1)
MCHC RBC-ENTMCNC: 29.1 PG — SIGNIFICANT CHANGE UP (ref 27–31)
MCHC RBC-ENTMCNC: 30.6 G/DL — LOW (ref 32–37)
MCV RBC AUTO: 95.1 FL — SIGNIFICANT CHANGE UP (ref 81–99)
MONOCYTES # BLD AUTO: 0.55 K/UL — SIGNIFICANT CHANGE UP (ref 0.1–0.6)
MONOCYTES NFR BLD AUTO: 3.5 % — SIGNIFICANT CHANGE UP (ref 1.7–9.3)
MRSA PCR RESULT.: POSITIVE
NEUTROPHILS # BLD AUTO: 13.89 K/UL — HIGH (ref 1.4–6.5)
NEUTROPHILS NFR BLD AUTO: 89.1 % — HIGH (ref 42.2–75.2)
NON HDL CHOLESTEROL: 72 MG/DL — SIGNIFICANT CHANGE UP
NRBC # BLD: 0 /100 WBCS — SIGNIFICANT CHANGE UP (ref 0–0)
PLATELET # BLD AUTO: 326 K/UL — SIGNIFICANT CHANGE UP (ref 130–400)
PMV BLD: 10.1 FL — SIGNIFICANT CHANGE UP (ref 7.4–10.4)
POTASSIUM SERPL-MCNC: 3.9 MMOL/L — SIGNIFICANT CHANGE UP (ref 3.5–5)
POTASSIUM SERPL-SCNC: 3.9 MMOL/L — SIGNIFICANT CHANGE UP (ref 3.5–5)
PROCALCITONIN SERPL-MCNC: 0.62 NG/ML — HIGH (ref 0.02–0.1)
PROT SERPL-MCNC: 5.6 G/DL — LOW (ref 6–8)
RBC # BLD: 2.85 M/UL — LOW (ref 4.2–5.4)
RBC # FLD: 18.6 % — HIGH (ref 11.5–14.5)
SODIUM SERPL-SCNC: 141 MMOL/L — SIGNIFICANT CHANGE UP (ref 135–146)
TRIGL SERPL-MCNC: 90 MG/DL — SIGNIFICANT CHANGE UP
TROPONIN T, HIGH SENSITIVITY RESULT: 26 NG/L — HIGH (ref 6–13)
WBC # BLD: 15.59 K/UL — HIGH (ref 4.8–10.8)
WBC # FLD AUTO: 15.59 K/UL — HIGH (ref 4.8–10.8)

## 2024-06-01 PROCEDURE — 71045 X-RAY EXAM CHEST 1 VIEW: CPT | Mod: 26

## 2024-06-01 PROCEDURE — 99223 1ST HOSP IP/OBS HIGH 75: CPT

## 2024-06-01 RX ORDER — PANTOPRAZOLE SODIUM 20 MG/1
40 TABLET, DELAYED RELEASE ORAL DAILY
Refills: 0 | Status: DISCONTINUED | OUTPATIENT
Start: 2024-06-01 | End: 2024-06-13

## 2024-06-01 RX ORDER — SODIUM ZIRCONIUM CYCLOSILICATE 10 G/10G
10 POWDER, FOR SUSPENSION ORAL ONCE
Refills: 0 | Status: COMPLETED | OUTPATIENT
Start: 2024-06-01 | End: 2024-06-01

## 2024-06-01 RX ORDER — MUPIROCIN 20 MG/G
1 OINTMENT TOPICAL EVERY 12 HOURS
Refills: 0 | Status: COMPLETED | OUTPATIENT
Start: 2024-06-01 | End: 2024-06-06

## 2024-06-01 RX ORDER — LINEZOLID 600 MG/300ML
INJECTION, SOLUTION INTRAVENOUS
Refills: 0 | Status: DISCONTINUED | OUTPATIENT
Start: 2024-06-01 | End: 2024-06-12

## 2024-06-01 RX ORDER — VANCOMYCIN HCL 1 G
500 VIAL (EA) INTRAVENOUS EVERY 12 HOURS
Refills: 0 | Status: DISCONTINUED | OUTPATIENT
Start: 2024-06-01 | End: 2024-06-01

## 2024-06-01 RX ORDER — LINEZOLID 600 MG/300ML
600 INJECTION, SOLUTION INTRAVENOUS ONCE
Refills: 0 | Status: COMPLETED | OUTPATIENT
Start: 2024-06-01 | End: 2024-06-01

## 2024-06-01 RX ORDER — SODIUM CHLORIDE 9 MG/ML
1000 INJECTION, SOLUTION INTRAVENOUS
Refills: 0 | Status: DISCONTINUED | OUTPATIENT
Start: 2024-06-01 | End: 2024-06-02

## 2024-06-01 RX ORDER — LINEZOLID 600 MG/300ML
600 INJECTION, SOLUTION INTRAVENOUS EVERY 12 HOURS
Refills: 0 | Status: DISCONTINUED | OUTPATIENT
Start: 2024-06-01 | End: 2024-06-12

## 2024-06-01 RX ORDER — HEPARIN SODIUM 5000 [USP'U]/ML
5000 INJECTION INTRAVENOUS; SUBCUTANEOUS EVERY 12 HOURS
Refills: 0 | Status: DISCONTINUED | OUTPATIENT
Start: 2024-06-01 | End: 2024-06-13

## 2024-06-01 RX ORDER — MEROPENEM 1 G/30ML
2000 INJECTION INTRAVENOUS EVERY 8 HOURS
Refills: 0 | Status: DISCONTINUED | OUTPATIENT
Start: 2024-06-01 | End: 2024-06-04

## 2024-06-01 RX ORDER — FAMOTIDINE 10 MG/ML
20 INJECTION INTRAVENOUS
Refills: 0 | Status: DISCONTINUED | OUTPATIENT
Start: 2024-06-01 | End: 2024-06-01

## 2024-06-01 RX ORDER — IPRATROPIUM/ALBUTEROL SULFATE 18-103MCG
3 AEROSOL WITH ADAPTER (GRAM) INHALATION EVERY 6 HOURS
Refills: 0 | Status: DISCONTINUED | OUTPATIENT
Start: 2024-06-01 | End: 2024-06-13

## 2024-06-01 RX ORDER — FAMOTIDINE 10 MG/ML
20 INJECTION INTRAVENOUS DAILY
Refills: 0 | Status: DISCONTINUED | OUTPATIENT
Start: 2024-06-01 | End: 2024-06-01

## 2024-06-01 RX ADMIN — LINEZOLID 300 MILLIGRAM(S): 600 INJECTION, SOLUTION INTRAVENOUS at 21:41

## 2024-06-01 RX ADMIN — MIDODRINE HYDROCHLORIDE 10 MILLIGRAM(S): 2.5 TABLET ORAL at 01:56

## 2024-06-01 RX ADMIN — FAMOTIDINE 20 MILLIGRAM(S): 10 INJECTION INTRAVENOUS at 11:28

## 2024-06-01 RX ADMIN — RALOXIFENE HYDROCHLORIDE 60 MILLIGRAM(S): 60 TABLET, COATED ORAL at 11:28

## 2024-06-01 RX ADMIN — HEPARIN SODIUM 5000 UNIT(S): 5000 INJECTION INTRAVENOUS; SUBCUTANEOUS at 05:19

## 2024-06-01 RX ADMIN — Medication 3 MILLILITER(S): at 13:40

## 2024-06-01 RX ADMIN — GABAPENTIN 300 MILLIGRAM(S): 400 CAPSULE ORAL at 05:18

## 2024-06-01 RX ADMIN — Medication 3 MILLILITER(S): at 19:36

## 2024-06-01 RX ADMIN — LEVETIRACETAM 750 MILLIGRAM(S): 250 TABLET, FILM COATED ORAL at 05:17

## 2024-06-01 RX ADMIN — SODIUM ZIRCONIUM CYCLOSILICATE 10 GRAM(S): 10 POWDER, FOR SUSPENSION ORAL at 05:17

## 2024-06-01 RX ADMIN — Medication 3 MILLILITER(S): at 08:19

## 2024-06-01 RX ADMIN — Medication 3 MILLILITER(S): at 01:44

## 2024-06-01 RX ADMIN — LEVETIRACETAM 750 MILLIGRAM(S): 250 TABLET, FILM COATED ORAL at 17:31

## 2024-06-01 RX ADMIN — MEROPENEM 280 MILLIGRAM(S): 1 INJECTION INTRAVENOUS at 05:18

## 2024-06-01 RX ADMIN — PANTOPRAZOLE SODIUM 40 MILLIGRAM(S): 20 TABLET, DELAYED RELEASE ORAL at 11:28

## 2024-06-01 RX ADMIN — SODIUM CHLORIDE 75 MILLILITER(S): 9 INJECTION, SOLUTION INTRAVENOUS at 02:00

## 2024-06-01 RX ADMIN — HEPARIN SODIUM 5000 UNIT(S): 5000 INJECTION INTRAVENOUS; SUBCUTANEOUS at 17:32

## 2024-06-01 RX ADMIN — SODIUM CHLORIDE 75 MILLILITER(S): 9 INJECTION, SOLUTION INTRAVENOUS at 01:55

## 2024-06-01 RX ADMIN — MEROPENEM 280 MILLIGRAM(S): 1 INJECTION INTRAVENOUS at 21:41

## 2024-06-01 RX ADMIN — LINEZOLID 300 MILLIGRAM(S): 600 INJECTION, SOLUTION INTRAVENOUS at 11:28

## 2024-06-01 RX ADMIN — Medication 100 MILLIGRAM(S): at 05:18

## 2024-06-01 RX ADMIN — GABAPENTIN 300 MILLIGRAM(S): 400 CAPSULE ORAL at 17:32

## 2024-06-01 RX ADMIN — MEROPENEM 280 MILLIGRAM(S): 1 INJECTION INTRAVENOUS at 15:10

## 2024-06-01 NOTE — PROCEDURAL SAFETY CHECKLIST WITH OR WITHOUT SEDATION - NSPRESURGSED_GEN_ALL_CORE
n/a **** call for covid swab appt for sept 9th     [phone]   your surgery is scheduled for sept 10th     Telehealth visit this friday with Dr Lisker

## 2024-06-01 NOTE — CONSULT NOTE ADULT - SUBJECTIVE AND OBJECTIVE BOX
INFECTIOUS DISEASE CONSULT NOTE    Patient is a 58y old  Female who presents with a chief complaint of Hypoxia and Tachypnea (31 May 2024 23:37)    HPI:   57 yo F with hx of down syndrome, anemia, cerebral palsy, seizure, osteoporosis, hypotension on midodrine, nonverbal at baseline who was BIBEMS from [facility] for respiratory distress. Per EMS, pt was satting 74% on 4L NC. Pt was admitted 4/16-5/29 for severe sepsis and respiratory failure 2/2 recurrent CAP. During admission, blood cx grew staph capitis and VRE which cleared after daptomycin and meropenem completed on 5/26. BRIANA was negative for endocarditis. Pt also had recurrent vomiting and aspiration despite PEG tube. Further hx limited as pt is nonverbal.     GOC as follows:  Emelyn RIVAS decided on DNR/DNI per their documentation letter. (GOC done on 2/29/2024)    In the ED, patient was given dapto and danna as per previous cultures. Patient was given 3 L of IV fluids and then started on levophed. Labs showed WBC of 17 with left shift. Lactate 3.7->1.4. ABG shows mild respiratory acidosis. BIPAP applied and patient improved. CT abdomen was done < from: CT Abdomen and Pelvis w/ IV Cont (05.31.24 @ 20:26) >  IMPRESSION:    Slight interval increase bibasilar consolidative opacities, consistent   with pneumonia in the appropriate clinical setting.    No evidence of acute abdominal pathology.    < end of copied text >       (31 May 2024 23:37)         Prior hospital charts reviewed [Yes]  Primary team notes reviewed [Yes]  Other consultant notes reviewed [Yes]    REVIEW OF SYSTEMS:      PAST MEDICAL & SURGICAL HISTORY:  Down syndrome      Osteoporosis      Mild anemia      Neuropathy      Cerebral palsy      Seizure      Nonverbal      Hypotension  on midodrine      S/P debridement  of R hip on 3/2/21      S/P percutaneous endoscopic gastrostomy (PEG) tube placement          SOCIAL HISTORY:  - Born in [place], [place] in 19XX  - Currently working as / Retired  - Lives with _____; no pets  - No recent travel  - Denies tobacco use  - Denies alcohol use  - Denies illicit drug use  - Currently sexually active, has one male/female sexual partner    FAMILY HISTORY:      Allergies:  chlorhexidine containing compounds (Rash (Mild to Mod))      ANTIMICROBIALS:  meropenem  IVPB 2000 every 8 hours  vancomycin  IVPB 500 every 12 hours      ANTIMICROBIALS (past 90 days):  MEDICATIONS  (STANDING):    DAPTOmycin IVPB   116 mL/Hr IV Intermittent (05-31-24 @ 21:04)    meropenem  IVPB   280 mL/Hr IV Intermittent (06-01-24 @ 05:18)    meropenem  IVPB   100 mL/Hr IV Intermittent (05-31-24 @ 19:27)    vancomycin  IVPB   100 mL/Hr IV Intermittent (06-01-24 @ 05:18)        OTHER MEDS:   MEDICATIONS  (STANDING):  acetaminophen     Tablet .. 650 every 6 hours PRN  albuterol    90 MICROgram(s) HFA Inhaler 2 every 6 hours  albuterol/ipratropium for Nebulization 3 every 6 hours  famotidine    Tablet 20 daily  gabapentin 300 every 12 hours  heparin   Injectable 5000 every 12 hours  levETIRAcetam  Solution 750 two times a day  melatonin 3 at bedtime PRN  norepinephrine Infusion 0.05 <Continuous>  pantoprazole   Suspension 40 daily  raloxifene 60 daily  tiotropium 2.5 MICROgram(s) Inhaler 2 daily      VITALS:  Vital Signs Last 24 Hrs  T(F): 96.8 (06-01-24 @ 08:00), Max: 104.2 (05-31-24 @ 18:41)    Vital Signs Last 24 Hrs  HR: 53 (06-01-24 @ 08:00) (53 - 151)  BP: 114/60 (06-01-24 @ 08:00) (75/47 - 147/75)  RR: 16 (06-01-24 @ 08:00)  SpO2: 100% (06-01-24 @ 08:00) (75% - 100%)  Wt(kg): --    EXAM:    Labs:                        8.3    15.59 )-----------( 326      ( 01 Jun 2024 04:17 )             27.1     06-01    141  |  105  |  8<L>  ----------------------------<  173<H>  3.9   |  27  |  <0.5<L>    Ca    8.5      01 Jun 2024 04:17    TPro  5.6<L>  /  Alb  2.5<L>  /  TBili  0.2  /  DBili  x   /  AST  12  /  ALT  11  /  AlkPhos  79  06-01      WBC Trend:  WBC Count: 15.59 (06-01-24 @ 04:17)  WBC Count: 17.82 (05-31-24 @ 19:16)  WBC Count: 9.47 (05-29-24 @ 06:51)  WBC Count: 7.69 (05-28-24 @ 06:53)      Auto Neutrophil #: 13.89 K/uL (06-01-24 @ 04:17)  Auto Neutrophil #: 15.34 K/uL (05-31-24 @ 19:16)  Band Neutrophils %: 7.8 % (05-31-24 @ 19:16)  Auto Neutrophil #: 7.23 K/uL (05-29-24 @ 06:51)  Auto Neutrophil #: 5.07 K/uL (05-28-24 @ 06:53)      Creatine Trend:  Creatinine: <0.5 (06-01)  Creatinine: 0.7 (05-31)  Creatinine: 0.5 (05-28)  Creatinine: <0.5 (05-26)      Liver Biochemical Testing Trend:  Alanine Aminotransferase (ALT/SGPT): 11 (06-01)  Alanine Aminotransferase (ALT/SGPT): 18 (05-31)  Alanine Aminotransferase (ALT/SGPT): 10 (05-28)  Alanine Aminotransferase (ALT/SGPT): 8 (05-26)  Alanine Aminotransferase (ALT/SGPT): 8 (05-25)  Aspartate Aminotransferase (AST/SGOT): 12 (06-01-24 @ 04:17)  Aspartate Aminotransferase (AST/SGOT): 61 (05-31-24 @ 19:16)  Aspartate Aminotransferase (AST/SGOT): 13 (05-28-24 @ 06:53)  Aspartate Aminotransferase (AST/SGOT): 10 (05-26-24 @ 07:13)  Aspartate Aminotransferase (AST/SGOT): 14 (05-25-24 @ 07:35)  Bilirubin Total: 0.2 (06-01)  Bilirubin Total: 0.3 (05-31)  Bilirubin Total: <0.2 (05-28)  Bilirubin Total: <0.2 (05-26)  Bilirubin Total: <0.2 (05-25)      Trend LDH      Auto Eosinophil %: 0.0 % (06-01-24 @ 04:17)  Auto Eosinophil %: 1.7 % (05-31-24 @ 19:16)      Urinalysis Basic - ( 01 Jun 2024 04:17 )    Color: x / Appearance: x / SG: x / pH: x  Gluc: 173 mg/dL / Ketone: x  / Bili: x / Urobili: x   Blood: x / Protein: x / Nitrite: x   Leuk Esterase: x / RBC: x / WBC x   Sq Epi: x / Non Sq Epi: x / Bacteria: x        MICROBIOLOGY:    MRSA PCR Result.: Negative (04-16-24 @ 14:39)  MRSA PCR Result.: Negative (03-16-24 @ 23:45)  MRSA PCR Result.: Negative (02-20-24 @ 13:42)  MRSA PCR Result.: Negative (02-15-24 @ 06:20)  MRSA PCR Result.: Negative (02-12-24 @ 10:28)  MRSA PCR Result.: Negative (02-03-24 @ 21:32)  MRSA PCR Result.: Negative (01-22-24 @ 05:30)  MRSA PCR Result.: Negative (10-17-23 @ 17:20)  MRSA PCR Result.: Negative (09-29-23 @ 16:50)  MRSA PCR Result.: Negative (08-12-23 @ 06:10)  MRSA PCR Result.: Negative (08-04-23 @ 14:52)    Troponin T, High Sensitivity Result: 26 (06-01)  Troponin T, High Sensitivity Result: 26 (05-31)    Blood Gas Arterial, Lactate: 3.2 (06-01 @ 05:08)  Lactate, Blood: 1.4 (05-31 @ 21:21)  Blood Gas Venous - Lactate: 3.7 (05-31 @ 19:21)    A1C with Estimated Average Glucose Result: 5.0 % (04-17-24 @ 06:47)      RADIOLOGY:  imaging below personally reviewed    < from: Xray Chest 1 View-PORTABLE IMMEDIATE (Xray Chest 1 View-PORTABLE IMMEDIATE .) (06.01.24 @ 01:20) >  IMPRESSION:    Unchanged bilateral interstitial opacities, right slightly greater than   left.    < end of copied text >    < from: CT Abdomen and Pelvis w/ IV Cont (05.31.24 @ 20:26) >    IMPRESSION:    Slight interval increase bibasilar consolidative opacities, consistent   with pneumonia in the appropriate clinical setting.    No evidence of acute abdominal pathology.      < end of copied text >      < from: Xray Chest 1 View AP/PA (05.31.24 @ 19:42) >  Impression:    Low lung volume.    Bilateralopacities, unchanged.      < end of copied text >     INFECTIOUS DISEASE CONSULT NOTE    Patient is a 58y old  Female who presents with a chief complaint of Hypoxia and Tachypnea (31 May 2024 23:37)    HPI:   59 yo F with hx of down syndrome, anemia, cerebral palsy, seizure, osteoporosis, hypotension on midodrine, nonverbal at baseline who was BIBEMS from [facility] for respiratory distress. Per EMS, pt was satting 74% on 4L NC. Pt was admitted 4/16-5/29 for severe sepsis and respiratory failure 2/2 recurrent CAP. During admission, blood cx grew staph capitis and VRE which cleared after daptomycin and meropenem completed on 5/26. BRIANA was negative for endocarditis. Pt also had recurrent vomiting and aspiration despite PEG tube. Further hx limited as pt is nonverbal.     GOC as follows:  Emelyn RIVAS decided on DNR/DNI per their documentation letter. (GOC done on 2/29/2024)    In the ED, patient was given dapto and danna as per previous cultures. Patient was given 3 L of IV fluids and then started on levophed. Labs showed WBC of 17 with left shift. Lactate 3.7->1.4. ABG shows mild respiratory acidosis. BIPAP applied and patient improved. CT abdomen was done < from: CT Abdomen and Pelvis w/ IV Cont (05.31.24 @ 20:26) >  IMPRESSION:    Slight interval increase bibasilar consolidative opacities, consistent   with pneumonia in the appropriate clinical setting.    No evidence of acute abdominal pathology.    < end of copied text >       (31 May 2024 23:37)     Prior hospital charts reviewed [Yes]  Primary team notes reviewed [Yes]  Other consultant notes reviewed [Yes]    REVIEW OF SYSTEMS:  Unable to provide due to cognitive impairment      PAST MEDICAL & SURGICAL HISTORY:  Down syndrome      Osteoporosis      Mild anemia      Neuropathy      Cerebral palsy      Seizure      Nonverbal      Hypotension  on midodrine      S/P debridement  of R hip on 3/2/21      S/P percutaneous endoscopic gastrostomy (PEG) tube placement          SOCIAL HISTORY:  Unable to provide due to cognitive impairment      FAMILY HISTORY:  Unable to provide due to cognitive impairment      Allergies:  chlorhexidine containing compounds (Rash (Mild to Mod))      ANTIMICROBIALS:  meropenem  IVPB 2000 every 8 hours  vancomycin  IVPB 500 every 12 hours      ANTIMICROBIALS (past 90 days):  MEDICATIONS  (STANDING):    DAPTOmycin IVPB   116 mL/Hr IV Intermittent (05-31-24 @ 21:04)    meropenem  IVPB   280 mL/Hr IV Intermittent (06-01-24 @ 05:18)    meropenem  IVPB   100 mL/Hr IV Intermittent (05-31-24 @ 19:27)    vancomycin  IVPB   100 mL/Hr IV Intermittent (06-01-24 @ 05:18)        OTHER MEDS:   MEDICATIONS  (STANDING):  acetaminophen     Tablet .. 650 every 6 hours PRN  albuterol    90 MICROgram(s) HFA Inhaler 2 every 6 hours  albuterol/ipratropium for Nebulization 3 every 6 hours  famotidine    Tablet 20 daily  gabapentin 300 every 12 hours  heparin   Injectable 5000 every 12 hours  levETIRAcetam  Solution 750 two times a day  melatonin 3 at bedtime PRN  norepinephrine Infusion 0.05 <Continuous>  pantoprazole   Suspension 40 daily  raloxifene 60 daily  tiotropium 2.5 MICROgram(s) Inhaler 2 daily      VITALS:  Vital Signs Last 24 Hrs  T(F): 96.8 (06-01-24 @ 08:00), Max: 104.2 (05-31-24 @ 18:41)    Vital Signs Last 24 Hrs  HR: 53 (06-01-24 @ 08:00) (53 - 151)  BP: 114/60 (06-01-24 @ 08:00) (75/47 - 147/75)  RR: 16 (06-01-24 @ 08:00)  SpO2: 100% (06-01-24 @ 08:00) (75% - 100%)  Wt(kg): --    EXAM:  GENERAL: NAD, lying in bed  HEAD: No head lesions  EYES: Conjunctiva pink and cornea white  EAR, NOSE, MOUTH, THROAT: Normal external ears and nose, no discharges; dry mucous membranes; weaing Venturi mask  NECK: Supple, nontender to palpation; no JVD  RESPIRATORY: Rhonchi bilaterally  CARDIOVASCULAR: S1 S2  GASTROINTESTINAL: Soft, nontender, nondistended; normoactive bowel sounds  EXTREMITIES: No clubbing, cyanosis, or petal edema  NERVOUS SYSTEM: Very lethargic, difficult to arouse  MUSCULOSKELETAL: No joint erythema, swelling or pain  SKIN: No rashes or lesions, no superficial thrombophlebitis  PSYCH: Lethargic      Labs:                        8.3    15.59 )-----------( 326      ( 01 Jun 2024 04:17 )             27.1     06-01    141  |  105  |  8<L>  ----------------------------<  173<H>  3.9   |  27  |  <0.5<L>    Ca    8.5      01 Jun 2024 04:17    TPro  5.6<L>  /  Alb  2.5<L>  /  TBili  0.2  /  DBili  x   /  AST  12  /  ALT  11  /  AlkPhos  79  06-01      WBC Trend:  WBC Count: 15.59 (06-01-24 @ 04:17)  WBC Count: 17.82 (05-31-24 @ 19:16)  WBC Count: 9.47 (05-29-24 @ 06:51)  WBC Count: 7.69 (05-28-24 @ 06:53)      Auto Neutrophil #: 13.89 K/uL (06-01-24 @ 04:17)  Auto Neutrophil #: 15.34 K/uL (05-31-24 @ 19:16)  Band Neutrophils %: 7.8 % (05-31-24 @ 19:16)  Auto Neutrophil #: 7.23 K/uL (05-29-24 @ 06:51)  Auto Neutrophil #: 5.07 K/uL (05-28-24 @ 06:53)      Creatine Trend:  Creatinine: <0.5 (06-01)  Creatinine: 0.7 (05-31)  Creatinine: 0.5 (05-28)  Creatinine: <0.5 (05-26)      Liver Biochemical Testing Trend:  Alanine Aminotransferase (ALT/SGPT): 11 (06-01)  Alanine Aminotransferase (ALT/SGPT): 18 (05-31)  Alanine Aminotransferase (ALT/SGPT): 10 (05-28)  Alanine Aminotransferase (ALT/SGPT): 8 (05-26)  Alanine Aminotransferase (ALT/SGPT): 8 (05-25)  Aspartate Aminotransferase (AST/SGOT): 12 (06-01-24 @ 04:17)  Aspartate Aminotransferase (AST/SGOT): 61 (05-31-24 @ 19:16)  Aspartate Aminotransferase (AST/SGOT): 13 (05-28-24 @ 06:53)  Aspartate Aminotransferase (AST/SGOT): 10 (05-26-24 @ 07:13)  Aspartate Aminotransferase (AST/SGOT): 14 (05-25-24 @ 07:35)  Bilirubin Total: 0.2 (06-01)  Bilirubin Total: 0.3 (05-31)  Bilirubin Total: <0.2 (05-28)  Bilirubin Total: <0.2 (05-26)  Bilirubin Total: <0.2 (05-25)      Trend LDH      Auto Eosinophil %: 0.0 % (06-01-24 @ 04:17)  Auto Eosinophil %: 1.7 % (05-31-24 @ 19:16)      Urinalysis Basic - ( 01 Jun 2024 04:17 )    Color: x / Appearance: x / SG: x / pH: x  Gluc: 173 mg/dL / Ketone: x  / Bili: x / Urobili: x   Blood: x / Protein: x / Nitrite: x   Leuk Esterase: x / RBC: x / WBC x   Sq Epi: x / Non Sq Epi: x / Bacteria: x        MICROBIOLOGY:    MRSA PCR Result.: Negative (04-16-24 @ 14:39)  MRSA PCR Result.: Negative (03-16-24 @ 23:45)  MRSA PCR Result.: Negative (02-20-24 @ 13:42)  MRSA PCR Result.: Negative (02-15-24 @ 06:20)  MRSA PCR Result.: Negative (02-12-24 @ 10:28)  MRSA PCR Result.: Negative (02-03-24 @ 21:32)  MRSA PCR Result.: Negative (01-22-24 @ 05:30)  MRSA PCR Result.: Negative (10-17-23 @ 17:20)  MRSA PCR Result.: Negative (09-29-23 @ 16:50)  MRSA PCR Result.: Negative (08-12-23 @ 06:10)  MRSA PCR Result.: Negative (08-04-23 @ 14:52)    Troponin T, High Sensitivity Result: 26 (06-01)  Troponin T, High Sensitivity Result: 26 (05-31)    Blood Gas Arterial, Lactate: 3.2 (06-01 @ 05:08)  Lactate, Blood: 1.4 (05-31 @ 21:21)  Blood Gas Venous - Lactate: 3.7 (05-31 @ 19:21)    A1C with Estimated Average Glucose Result: 5.0 % (04-17-24 @ 06:47)      RADIOLOGY:  imaging below personally reviewed    < from: Xray Chest 1 View-PORTABLE IMMEDIATE (Xray Chest 1 View-PORTABLE IMMEDIATE .) (06.01.24 @ 01:20) >  IMPRESSION:    Unchanged bilateral interstitial opacities, right slightly greater than   left.    < end of copied text >    < from: CT Abdomen and Pelvis w/ IV Cont (05.31.24 @ 20:26) >    IMPRESSION:    Slight interval increase bibasilar consolidative opacities, consistent   with pneumonia in the appropriate clinical setting.    No evidence of acute abdominal pathology.      < end of copied text >      < from: Xray Chest 1 View AP/PA (05.31.24 @ 19:42) >  Impression:    Low lung volume.    Bilateralopacities, unchanged.      < end of copied text >

## 2024-06-01 NOTE — CONSULT NOTE ADULT - ASSESSMENT
ID is consulted for pneumonia  Hx polymicrobial bacteremia with ESBL Proteus, VRE, and CoNS, treated with daptomycin and danna  Febrile to 104.2, WBC 15.59  Lactic acid 3.7 > 1.4  COVID/flu/RSV negative  BCx pending  Procal pending    Antibiotics:  Meropenem 5/31 ->  Daptomycin 5/31  Vancomycin 6/1      IMPRESSION:  Multifocal pneumonia  Lactic acidosis  Septic shock  Hx Seizure    RECOMMENDATIONS:  - Continue IV meropenem 2g q8hrs for now given septic shock  - D/C vancomycin, start IV linezolid 600mg q12hrs  - Obtain UA and UCx  - Obtain urine Legionella Ag, urine Strep Ag, MRSA nare PCR  - Follow up BCx  - Offloading and frequent position changes, aspiration precaution  - Trend WBC, fever curve, transaminases, creatinine daily      * THIS IS AN INCOMPLETE NOTE. FINAL RECOMMENDATION IS PENDING *   59 yo F with hx of down syndrome, anemia, cerebral palsy, seizure, osteoporosis, hypotension on midodrine, nonverbal at baseline who was BIBEMS from [facility] for respiratory distress. Per EMS, pt was satting 74% on 4L NC.     ID is consulted for pneumonia  Hx polymicrobial bacteremia with ESBL Proteus, VRE, and CoNS, treated with daptomycin and danna  Febrile to 104.2, WBC 15.59  Lactic acid 3.7 > 1.4  COVID/flu/RSV negative  BCx pending  Procal pending    Antibiotics:  Meropenem 5/31 ->  Daptomycin 5/31  Vancomycin 6/1      IMPRESSION:  Multifocal pneumonia  Lactic acidosis  Septic shock  Hx Seizure  Immunosuppression secondary to multiple comorbidities which could result in poor clinical outcome      RECOMMENDATIONS:  - Continue IV meropenem 2g q8hrs for now given septic shock. Monitor for signs of breakthrough seizure  - D/C vancomycin, start IV linezolid 600mg q12hrs  - Obtain UA and UCx  - Obtain urine Legionella Ag, urine Strep Ag, MRSA nare PCR  - Follow up BCx  - Offloading and frequent position changes, aspiration precaution  - Trend WBC, fever curve, transaminases, creatinine daily      Renee Ritchie D.O.  Attending Physician  Division of Infectious Diseases  [facility]  Please contact me via [employer] Teams

## 2024-06-01 NOTE — PATIENT PROFILE ADULT - FUNCTIONAL ASSESSMENT - DAILY ACTIVITY 6.
EGD and Colonoscopy Procedure  Note            Patient: Domenic Justin  : 1968  CSN:     Procedure: Esophagogastroduodenoscopy with colonoscopy    Date:  2024     Surgeon:  Guerita Martinez MD     Referring Provider:  Deya Garcia    Preoperative Diagnosis:  Soler's esophagitis, history of colon polyps removed    Postoperative Diagnosis:  esophagitis, gastritis, normal colonoscopy    Anesthesia:  Propofol    EBL: <50 mL    Indications: This is a 55 y.o. year old male who presents today with Follow up Barretts esophagus.  History of colon polyp removed      Procedure Details:    With the patient in left lateral position the endoscope was passed through the hypopharynx into the esophagus. The scope was advanced all the way to the duodenum.  The mucosa in the duodenal bulb, post bulbar region in the descending duodenum appeared normal.  Biopsies were taken to rule out celiac disease.  The mucosa in the antrum appeared erythematous, biopsies were taken to rule out Hpylori bacteria.   The mucosa in the remaining part of the stomach and retroflexion appeared normal.  The GE junction was slightly irregular, biopsies were taken for esophagitis.  The mucosa in the remainder of the esophagus appeared normal.  The scope was withdrawn and the procedure was terminated.    Gastric or Duodenal ulcer present: No    Procedure Details:    With the patient in left lateral decubitus position the endoscope was inserted through the anorectal area into the rectum. The scope was then advanced through the length of the colon to the ileum. The ileocecal valve was visualized and cannulated. The quality of preparation was good. Water was insufflated through the biopsy channel to clean out the colon and look at the underlying mucosa. The scope was carefully withdrawn and found to be normal.    Digital rectal examination was performed, no abnormalities noted.  The scope was advanced all the way to the cecum, the mucosa appeared  1 = Total assistance

## 2024-06-01 NOTE — PROGRESS NOTE ADULT - ASSESSMENT
57 yo F with hx of down syndrome, anemia, cerebral palsy, seizure, osteoporosis, hypotension on midodrine, nonverbal at baseline who was BIBEMS from [facility] for respiratory distress. Per EMS, pt was satting 74% on 4L NC. Pt was admitted 4/16-5/29 for severe sepsis and respiratory failure 2/2 recurrent CAP. During admission, blood cx grew staph capitis and VRE which cleared after daptomycin and meropenem completed on 5/26. BRIANA was negative for endocarditis. Pt also had recurrent vomiting and aspiration despite PEG tube. Further hx limited as pt is nonverbal.     IMPRESSION:  Acute Hypoxemic Hypercapnic Respiratory Failure  Septic Shock   Pneumonia- possibly aspiration  Lactic Acidosis- Resolved  Normocytic Anemia  Hx of Down Syndrome  Hx of Cerebral Palsy/ Nonverbal at baseline  Hx of Seizures  Hx of OP      PLAN: see attestation note     CNS: keep off sedatives    HEENT: Oral Care    PULMONARY: HOB @ 45, aspiration precautions BIPAP as needed, try to wean off, Keep spo2 92 TO 96%. chest PT. Duonebs q6hrs and PRN.    CARDIOVASCULAR: Recent echo with normal EF, goal directed fluid resuscitation, keep LR at 75 cc/hr. Trend trop    GI: GI prophylaxis.  PEG feeding tomorrow    RENAL: Strict In/out, keep euvolemic     INFECTIOUS DISEASE: Follow up blood culture. Sputum culture. IV abx: Meropenem, vancomycin. Urine strep pneumo & legionella. Full RVP panel. MRSA nares. ID evaluation    HEMATOLOGICAL:  DVT prophylaxis.    ENDOCRINE:  Follow up FS.  Insulin protocol if needed.    MUSCULOSKELETAL: Bedrest    TLC on 5/31    Dispo: MICU               Attestation Statements:  I have personally seen and examined the patient.  I fully participated in the care of this patient.  I have made amendments to the documentation where necessary, and agree with the history, physical exam, and plan as documented by the Resident.     patient seen and examined agree above note   aspiration precaution   ID consult   continue abx   nasal mrsa   procal     NIV 4 on and 4 off at night and as needed   keep pox >92%   peg feeding   follow cx   on levo 0.12   follow h/h   follow lytes   continue IV fluid .

## 2024-06-01 NOTE — PROGRESS NOTE ADULT - SUBJECTIVE AND OBJECTIVE BOX
HPI:   59 yo F with hx of down syndrome, anemia, cerebral palsy, seizure, osteoporosis, hypotension on midodrine, nonverbal at baseline who was BIBEMS from [facility] for respiratory distress. Per EMS, pt was satting 74% on 4L NC. Pt was admitted 4/16-5/29 for severe sepsis and respiratory failure 2/2 recurrent CAP. During admission, blood cx grew staph capitis and VRE which cleared after daptomycin and meropenem completed on 5/26. BRIANA was negative for endocarditis. Pt also had recurrent vomiting and aspiration despite PEG tube. Further hx limited as pt is nonverbal.     GOC as follows:  [facility] decided on DNR/DNI per their documentation letter. (GOC done on 2/29/2024)    In the ED, patient was given dapto and danna as per previous cultures. Patient was given 3 L of IV fluids and then started on levophed. Labs showed WBC of 17 with left shift. Lactate 3.7->1.4. ABG shows mild respiratory acidosis. BIPAP applied and patient improved. CT abdomen was done < from: CT Abdomen and Pelvis w/ IV Cont (05.31.24 @ 20:26) >  IMPRESSION:    Slight interval increase bibasilar consolidative opacities, consistent   with pneumonia in the appropriate clinical setting.    No evidence of acute abdominal pathology.    < end of copied text >       (31 May 2024 23:37)        Primary diagnosis of Septic shock        24H events:    Today is hospital day 1d. This morning patient was seen and examined at bedside, resting comfortably in bed.    No acute or major events overnight.      PAST MEDICAL & SURGICAL HISTORY  Down syndrome    Osteoporosis    Mild anemia    Neuropathy    Cerebral palsy    Seizure    Nonverbal    Hypotension  on midodrine    S/P debridement  of R hip on 3/2/21    S/P percutaneous endoscopic gastrostomy (PEG) tube placement      SOCIAL HISTORY:  Social History:      ALLERGIES:  chlorhexidine containing compounds (Rash (Mild to Mod))    MEDICATIONS:  STANDING MEDICATIONS  albuterol    90 MICROgram(s) HFA Inhaler 2 Puff(s) Inhalation every 6 hours  albuterol/ipratropium for Nebulization 3 milliLiter(s) Nebulizer every 6 hours  famotidine    Tablet 20 milliGRAM(s) Oral daily  gabapentin 300 milliGRAM(s) Oral every 12 hours  heparin   Injectable 5000 Unit(s) SubCutaneous every 12 hours  lactated ringers. 1000 milliLiter(s) IV Continuous <Continuous>  levETIRAcetam  Solution 750 milliGRAM(s) Oral two times a day  meropenem  IVPB 2000 milliGRAM(s) IV Intermittent every 8 hours  norepinephrine Infusion 0.05 MICROgram(s)/kG/Min IV Continuous <Continuous>  pantoprazole   Suspension 40 milliGRAM(s) Oral daily  raloxifene 60 milliGRAM(s) Oral daily  tiotropium 2.5 MICROgram(s) Inhaler 2 Puff(s) Inhalation daily  vancomycin  IVPB 500 milliGRAM(s) IV Intermittent every 12 hours    PRN MEDICATIONS  acetaminophen     Tablet .. 650 milliGRAM(s) Oral every 6 hours PRN  melatonin 3 milliGRAM(s) Oral at bedtime PRN    VITALS:   T(F): 96.8  HR: 53  BP: 114/60  RR: 16  SpO2: 100%    PHYSICAL EXAM:  GENERAL:   (  ) NAD, lying in bed comfortably     (  ) obtunded     (x  ) lethargic     (  ) somnolent    HEAD:   (x  ) Atraumatic     (  ) hematoma     (  ) laceration (specify location:       )     NECK:  ( x ) Supple     (  ) neck stiffness     (  ) nuchal rigidity     (  )  no JVD     (  ) JVD present ( -- cm)    HEART:  Rate -->     (x  ) normal rate     (  ) bradycardic     (  ) tachycardic  Rhythm -->     (  ) regular     (  ) regularly irregular     (  ) irregularly irregular  Murmurs -->     (  ) normal s1s2     (  ) systolic murmur     (  ) diastolic murmur     (  ) continuous murmur      (  ) S3 present     (  ) S4 present    LUNGS: on bipap  (  )Unlabored respirations     (  ) tachypnea  (  ) B/L air entry     (  ) decreased breath sounds in:  (location     )    (  ) no adventitious sound     (  ) crackles     (  ) wheezing      (  ) rhonchi      (specify location:       )  (  ) chest wall tenderness (specify location:       )    ABDOMEN:   ( x ) Soft     (  ) tense   |   (  ) nondistended     (  ) distended   |   (  ) +BS     (  ) hypoactive bowel sounds     (  ) hyperactive bowel sounds  (  ) nontender     (  ) RUQ tenderness     (  ) RLQ tenderness     (  ) LLQ tenderness     (  ) epigastric tenderness     (  ) diffuse tenderness  (  ) Splenomegaly      (  ) Hepatomegaly      (  ) Jaundice     (  ) ecchymosis     EXTREMITIES:  (x  ) Normal     (  ) Rash     (  ) ecchymosis     (  ) varicose veins      (  ) pitting edema     (  ) non-pitting edema   (  ) ulceration     (  ) gangrene:     (location:     )    NERVOUS SYSTEM:    (  ) A&Ox3     (  ) confused     (  ) lethargic  CN II-XII:     (  ) Intact     (  ) deficits found     (Specify:     )   Upper extremities:     (  ) no sensorimotor deficits     (  ) weakness     (  ) loss of proprioception/vibration     (  ) loss of touch/temperature (specify:    )  Lower extremities:     (  ) no sensorimotor deficits     (  ) weakness     (  ) loss of proprioception/vibration     (  ) loss of touch/temperature (specify:    )    SKIN:   (  ) No rashes or lesions     (  ) maculopapular rash     (  ) pustules     (  ) vesicles     (  ) ulcer     (  ) ecchymosis     (specify location:     )    AMPAC score:    (  ) Indwelling Madsen Catheter:   Date insterted:    Reason (  ) Critical illness     (  ) urinary retention    (  ) Accurate Ins/Outs Monitoring     (  ) CMO patient    LABS:                        8.3    15.59 )-----------( 326      ( 01 Jun 2024 04:17 )             27.1     06-01    141  |  105  |  8<L>  ----------------------------<  173<H>  3.9   |  27  |  <0.5<L>    Ca    8.5      01 Jun 2024 04:17    TPro  5.6<L>  /  Alb  2.5<L>  /  TBili  0.2  /  DBili  x   /  AST  12  /  ALT  11  /  AlkPhos  79  06-01    PT/INR - ( 31 May 2024 19:16 )   PT: 15.90 sec;   INR: 1.39 ratio         PTT - ( 31 May 2024 19:16 )  PTT:32.7 sec  Urinalysis Basic - ( 01 Jun 2024 04:17 )    Color: x / Appearance: x / SG: x / pH: x  Gluc: 173 mg/dL / Ketone: x  / Bili: x / Urobili: x   Blood: x / Protein: x / Nitrite: x   Leuk Esterase: x / RBC: x / WBC x   Sq Epi: x / Non Sq Epi: x / Bacteria: x      ABG - ( 01 Jun 2024 05:08 )  pH, Arterial: 7.46  pH, Blood: x     /  pCO2: 38    /  pO2: 334   / HCO3: 27    / Base Excess: 3.0   /  SaO2: 99.7              Creatine Kinase, Serum: 32 U/L (06-01-24 @ 04:17)  Lactate, Blood: 1.4 mmol/L (05-31-24 @ 21:21)      CARDIAC MARKERS ( 01 Jun 2024 04:17 )  x     / x     / 32 U/L / x     / x          RADIOLOGY:

## 2024-06-01 NOTE — PATIENT PROFILE ADULT - MONEY FOR FOOD
Tranexamic Acid Counseling:  Patient advised of the small risk of bleeding problems with tranexamic acid. They were also instructed to call if they developed any nausea, vomiting or diarrhea. All of the patient's questions and concerns were addressed. Hydroquinone Counseling:  Patient advised that medication may result in skin irritation, lightening (hypopigmentation), dryness, and burning.  In the event of skin irritation, the patient was advised to reduce the amount of the drug applied or use it less frequently.  Rarely, spots that are treated with hydroquinone can become darker (pseudoochronosis).  Should this occur, patient instructed to stop medication and call the office. The patient verbalized understanding of the proper use and possible adverse effects of hydroquinone.  All of the patient's questions and concerns were addressed. Doxycycline Pregnancy And Lactation Text: This medication is Pregnancy Category D and not consider safe during pregnancy. It is also excreted in breast milk but is considered safe for shorter treatment courses. Bexarotene Pregnancy And Lactation Text: This medication is Pregnancy Category X and should not be given to women who are pregnant or may become pregnant. This medication should not be used if you are breast feeding. Rhofade Pregnancy And Lactation Text: This medication has not been assigned a Pregnancy Risk Category. It is unknown if the medication is excreted in breast milk. Bactrim Counseling:  I discussed with the patient the risks of sulfa antibiotics including but not limited to GI upset, allergic reaction, drug rash, diarrhea, dizziness, photosensitivity, and yeast infections.  Rarely, more serious reactions can occur including but not limited to aplastic anemia, agranulocytosis, methemoglobinemia, blood dyscrasias, liver or kidney failure, lung infiltrates or desquamative/blistering drug rashes. Clofazimine Pregnancy And Lactation Text: This medication is Pregnancy Category C and isn't considered safe during pregnancy. It is excreted in breast milk. Azathioprine Pregnancy And Lactation Text: This medication is Pregnancy Category D and isn't considered safe during pregnancy. It is unknown if this medication is excreted in breast milk. Carac Pregnancy And Lactation Text: This medication is Pregnancy Category X and contraindicated in pregnancy and in women who may become pregnant. It is unknown if this medication is excreted in breast milk. Azelaic Acid Pregnancy And Lactation Text: This medication is considered safe during pregnancy and breast feeding. Low Dose Naltrexone Counseling- I discussed with the patient the potential risks and side effects of low dose naltrexone including but not limited to: more vivid dreams, headaches, nausea, vomiting, abdominal pain, fatigue, dizziness, and anxiety. Rituxan Pregnancy And Lactation Text: This medication is Pregnancy Category C and it isn't know if it is safe during pregnancy. It is unknown if this medication is excreted in breast milk but similar antibodies are known to be excreted. Topical Steroids Applications Pregnancy And Lactation Text: Most topical steroids are considered safe to use during pregnancy and lactation.  Any topical steroid applied to the breast or nipple should be washed off before breastfeeding. Cyclosporine Counseling:  I discussed with the patient the risks of cyclosporine including but not limited to hypertension, gingival hyperplasia,myelosuppression, immunosuppression, liver damage, kidney damage, neurotoxicity, lymphoma, and serious infections. The patient understands that monitoring is required including baseline blood pressure, CBC, CMP, lipid panel and uric acid, and then 1-2 times monthly CMP and blood pressure. Picato Counseling:  I discussed with the patient the risks of Picato including but not limited to erythema, scaling, itching, weeping, crusting, and pain. Infliximab Pregnancy And Lactation Text: This medication is Pregnancy Category B and is considered safe during pregnancy. It is unknown if this medication is excreted in breast milk. Klisyri Counseling:  I discussed with the patient the risks of Klisyri including but not limited to erythema, scaling, itching, weeping, crusting, and pain. Minocycline Pregnancy And Lactation Text: This medication is Pregnancy Category D and not consider safe during pregnancy. It is also excreted in breast milk. Drysol Counseling:  I discussed with the patient the risks of drysol/aluminum chloride including but not limited to skin rash, itching, irritation, burning. Libtayo Counseling- I discussed with the patient the risks of Libtayo including but not limited to nausea, vomiting, diarrhea, and bone or muscle pain.  The patient verbalized understanding of the proper use and possible adverse effects of Libtayo.  All of the patient's questions and concerns were addressed. Clindamycin Counseling: I counseled the patient regarding use of clindamycin as an antibiotic for prophylactic and/or therapeutic purposes. Clindamycin is active against numerous classes of bacteria, including skin bacteria. Side effects may include nausea, diarrhea, gastrointestinal upset, rash, hives, yeast infections, and in rare cases, colitis. no Eucrisa Counseling: Patient may experience a mild burning sensation during topical application. Eucrisa is not approved in children less than 2 years of age. Otezla Pregnancy And Lactation Text: This medication is Pregnancy Category C and it isn't known if it is safe during pregnancy. It is unknown if it is excreted in breast milk. Cimetidine Pregnancy And Lactation Text: This medication is Pregnancy Category B and is considered safe during pregnancy. It is also excreted in breast milk and breast feeding isn't recommended. Nsaids Counseling: NSAID Counseling: I discussed with the patient that NSAIDs should be taken with food. Prolonged use of NSAIDs can result in the development of stomach ulcers.  Patient advised to stop taking NSAIDs if abdominal pain occurs.  The patient verbalized understanding of the proper use and possible adverse effects of NSAIDs.  All of the patient's questions and concerns were addressed. Glycopyrrolate Counseling:  I discussed with the patient the risks of glycopyrrolate including but not limited to skin rash, drowsiness, dry mouth, difficulty urinating, and blurred vision. Winlevi Pregnancy And Lactation Text: This medication is considered safe during pregnancy and breastfeeding. Bactrim Pregnancy And Lactation Text: This medication is Pregnancy Category D and is known to cause fetal risk.  It is also excreted in breast milk. Calcipotriene Pregnancy And Lactation Text: This medication has not been proven safe during pregnancy. It is unknown if this medication is excreted in breast milk. Topical Sulfur Applications Pregnancy And Lactation Text: This medication is Pregnancy Category C and has an unknown safety profile during pregnancy. It is unknown if this topical medication is excreted in breast milk. Libtayo Pregnancy And Lactation Text: This medication is contraindicated in pregnancy and when breast feeding. Cyclophosphamide Pregnancy And Lactation Text: This medication is Pregnancy Category D and it isn't considered safe during pregnancy. This medication is excreted in breast milk. Itraconazole Counseling:  I discussed with the patient the risks of itraconazole including but not limited to liver damage, nausea/vomiting, neuropathy, and severe allergy.  The patient understands that this medication is best absorbed when taken with acidic beverages such as non-diet cola or ginger ale.  The patient understands that monitoring is required including baseline LFTs and repeat LFTs at intervals.  The patient understands that they are to contact us or the primary physician if concerning signs are noted. SSKI Counseling:  I discussed with the patient the risks of SSKI including but not limited to thyroid abnormalities, metallic taste, GI upset, fever, headache, acne, arthralgias, paraesthesias, lymphadenopathy, easy bleeding, arrhythmias, and allergic reaction. Benzoyl Peroxide Counseling: Patient counseled that medicine may cause skin irritation and bleach clothing.  In the event of skin irritation, the patient was advised to reduce the amount of the drug applied or use it less frequently.   The patient verbalized understanding of the proper use and possible adverse effects of benzoyl peroxide.  All of the patient's questions and concerns were addressed. Erivedge Counseling- I discussed with the patient the risks of Erivedge including but not limited to nausea, vomiting, diarrhea, constipation, weight loss, changes in the sense of taste, decreased appetite, muscle spasms, and hair loss.  The patient verbalized understanding of the proper use and possible adverse effects of Erivedge.  All of the patient's questions and concerns were addressed. Zoryve Counseling:  I discussed with the patient that Zoryve is not for use in the eyes, mouth or vagina. The most commonly reported side effects include diarrhea, headache, insomnia, application site pain, upper respiratory tract infections, and urinary tract infections.  All of the patient's questions and concerns were addressed. Opioid Pregnancy And Lactation Text: These medications can lead to premature delivery and should be avoided during pregnancy. These medications are also present in breast milk in small amounts. Otezla Counseling: The side effects of Otezla were discussed with the patient, including but not limited to worsening or new depression, weight loss, diarrhea, nausea, upper respiratory tract infection, and headache. Patient instructed to call the office should any adverse effect occur.  The patient verbalized understanding of the proper use and possible adverse effects of Otezla.  All the patient's questions and concerns were addressed. Finasteride Pregnancy And Lactation Text: This medication is absolutely contraindicated during pregnancy. It is unknown if it is excreted in breast milk. Olumiant Pregnancy And Lactation Text: Based on animal studies, Olumiant may cause embryo-fetal harm when administered to pregnant women.  The medication should not be used in pregnancy.  Breastfeeding is not recommended during treatment. Prednisone Pregnancy And Lactation Text: This medication is Pregnancy Category C and it isn't know if it is safe during pregnancy. This medication is excreted in breast milk. Qbrexza Counseling:  I discussed with the patient the risks of Qbrexza including but not limited to headache, mydriasis, blurred vision, dry eyes, nasal dryness, dry mouth, dry throat, dry skin, urinary hesitation, and constipation.  Local skin reactions including erythema, burning, stinging, and itching can also occur. Hydroxychloroquine Pregnancy And Lactation Text: This medication has been shown to cause fetal harm but it isn't assigned a Pregnancy Risk Category. There are small amounts excreted in breast milk. Topical Retinoid counseling:  Patient advised to apply a pea-sized amount only at bedtime and wait 30 minutes after washing their face before applying.  If too drying, patient may add a non-comedogenic moisturizer. The patient verbalized understanding of the proper use and possible adverse effects of retinoids.  All of the patient's questions and concerns were addressed. Oral Minoxidil Counseling- I discussed with the patient the risks of oral minoxidil including but not limited to shortness of breath, swelling of the feet or ankles, dizziness, lightheadedness, unwanted hair growth and allergic reaction.  The patient verbalized understanding of the proper use and possible adverse effects of oral minoxidil.  All of the patient's questions and concerns were addressed. Minoxidil Counseling: Minoxidil is a topical medication which can increase blood flow where it is applied. It is uncertain how this medication increases hair growth. Side effects are uncommon and include stinging and allergic reactions. Infliximab Counseling:  I discussed with the patient the risks of infliximab including but not limited to myelosuppression, immunosuppression, autoimmune hepatitis, demyelinating diseases, lymphoma, and serious infections.  The patient understands that monitoring is required including a PPD at baseline and must alert us or the primary physician if symptoms of infection or other concerning signs are noted. Topical Metronidazole Counseling: Metronidazole is a topical antibiotic medication. You may experience burning, stinging, redness, or allergic reactions.  Please call our office if you develop any problems from using this medication. 5-Fu Counseling: 5-Fluorouracil Counseling:  I discussed with the patient the risks of 5-fluorouracil including but not limited to erythema, scaling, itching, weeping, crusting, and pain. Terbinafine Counseling: Patient counseling regarding adverse effects of terbinafine including but not limited to headache, diarrhea, rash, upset stomach, liver function test abnormalities, itching, taste/smell disturbance, nausea, abdominal pain, and flatulence.  There is a rare possibility of liver failure that can occur when taking terbinafine.  The patient understands that a baseline LFT and kidney function test may be required. The patient verbalized understanding of the proper use and possible adverse effects of terbinafine.  All of the patient's questions and concerns were addressed. Imiquimod Counseling:  I discussed with the patient the risks of imiquimod including but not limited to erythema, scaling, itching, weeping, crusting, and pain.  Patient understands that the inflammatory response to imiquimod is variable from person to person and was educated regarded proper titration schedule.  If flu-like symptoms develop, patient knows to discontinue the medication and contact us. Valtrex Pregnancy And Lactation Text: this medication is Pregnancy Category B and is considered safe during pregnancy. This medication is not directly found in breast milk but it's metabolite acyclovir is present. Cephalexin Pregnancy And Lactation Text: This medication is Pregnancy Category B and considered safe during pregnancy.  It is also excreted in breast milk but can be used safely for shorter doses. Xeljanz Counseling: I discussed with the patient the risks of Xeljanz therapy including increased risk of infection, liver issues, headache, diarrhea, or cold symptoms. Live vaccines should be avoided. They were instructed to call if they have any problems. Erivedge Pregnancy And Lactation Text: This medication is Pregnancy Category X and is absolutely contraindicated during pregnancy. It is unknown if it is excreted in breast milk. Enbrel Counseling:  I discussed with the patient the risks of etanercept including but not limited to myelosuppression, immunosuppression, autoimmune hepatitis, demyelinating diseases, lymphoma, and infections.  The patient understands that monitoring is required including a PPD at baseline and must alert us or the primary physician if symptoms of infection or other concerning signs are noted. Oxybutynin Counseling:  I discussed with the patient the risks of oxybutynin including but not limited to skin rash, drowsiness, dry mouth, difficulty urinating, and blurred vision. Isotretinoin Pregnancy And Lactation Text: This medication is Pregnancy Category X and is considered extremely dangerous during pregnancy. It is unknown if it is excreted in breast milk. Winlevi Counseling:  I discussed with the patient the risks of topical clascoterone including but not limited to erythema, scaling, itching, and stinging. Patient voiced their understanding. Zoryve Pregnancy And Lactation Text: It is unknown if this medication can cause problems during pregnancy and breastfeeding. Topical Sulfur Applications Counseling: Topical Sulfur Counseling: Patient counseled that this medication may cause skin irritation or allergic reactions.  In the event of skin irritation, the patient was advised to reduce the amount of the drug applied or use it less frequently.   The patient verbalized understanding of the proper use and possible adverse effects of topical sulfur application.  All of the patient's questions and concerns were addressed. Minoxidil Pregnancy And Lactation Text: This medication has not been assigned a Pregnancy Risk Category but animal studies failed to show danger with the topical medication. It is unknown if the medication is excreted in breast milk. Xolair Counseling:  Patient informed of potential adverse effects including but not limited to fever, muscle aches, rash and allergic reactions.  The patient verbalized understanding of the proper use and possible adverse effects of Xolair.  All of the patient's questions and concerns were addressed. Simponi Pregnancy And Lactation Text: The risk during pregnancy and breastfeeding is uncertain with this medication. Metronidazole Counseling:  I discussed with the patient the risks of metronidazole including but not limited to seizures, nausea/vomiting, a metallic taste in the mouth, nausea/vomiting and severe allergy. Quinolones Counseling:  I discussed with the patient the risks of fluoroquinolones including but not limited to GI upset, allergic reaction, drug rash, diarrhea, dizziness, photosensitivity, yeast infections, liver function test abnormalities, tendonitis/tendon rupture. VTAMA Counseling: I discussed with the patient that VTAMA is not for use in the eyes, mouth or mouth. They should call the office if they develop any signs of allergic reactions to VTAMA. The patient verbalized understanding of the proper use and possible adverse effects of VTAMA.  All of the patient's questions and concerns were addressed. Qbrexza Pregnancy And Lactation Text: There is no available data on Qbrexza use in pregnant women.  There is no available data on Qbrexza use in lactation. Albendazole Counseling:  I discussed with the patient the risks of albendazole including but not limited to cytopenia, kidney damage, nausea/vomiting and severe allergy.  The patient understands that this medication is being used in an off-label manner. Niacinamide Pregnancy And Lactation Text: These medications are considered safe during pregnancy. Hydroxyzine Pregnancy And Lactation Text: This medication is not safe during pregnancy and should not be taken. It is also excreted in breast milk and breast feeding isn't recommended. Spironolactone Pregnancy And Lactation Text: This medication can cause feminization of the male fetus and should be avoided during pregnancy. The active metabolite is also found in breast milk. Humira Counseling:  I discussed with the patient the risks of adalimumab including but not limited to myelosuppression, immunosuppression, autoimmune hepatitis, demyelinating diseases, lymphoma, and serious infections.  The patient understands that monitoring is required including a PPD at baseline and must alert us or the primary physician if symptoms of infection or other concerning signs are noted. Cellcept Counseling:  I discussed with the patient the risks of mycophenolate mofetil including but not limited to infection/immunosuppression, GI upset, hypokalemia, hypercholesterolemia, bone marrow suppression, lymphoproliferative disorders, malignancy, GI ulceration/bleed/perforation, colitis, interstitial lung disease, kidney failure, progressive multifocal leukoencephalopathy, and birth defects.  The patient understands that monitoring is required including a baseline creatinine and regular CBC testing. In addition, patient must alert us immediately if symptoms of infection or other concerning signs are noted. Doxycycline Counseling:  Patient counseled regarding possible photosensitivity and increased risk for sunburn.  Patient instructed to avoid sunlight, if possible.  When exposed to sunlight, patients should wear protective clothing, sunglasses, and sunscreen.  The patient was instructed to call the office immediately if the following severe adverse effects occur:  hearing changes, easy bruising/bleeding, severe headache, or vision changes.  The patient verbalized understanding of the proper use and possible adverse effects of doxycycline.  All of the patient's questions and concerns were addressed. Cephalexin Counseling: I counseled the patient regarding use of cephalexin as an antibiotic for prophylactic and/or therapeutic purposes. Cephalexin (commonly prescribed under brand name Keflex) is a cephalosporin antibiotic which is active against numerous classes of bacteria, including most skin bacteria. Side effects may include nausea, diarrhea, gastrointestinal upset, rash, hives, yeast infections, and in rare cases, hepatitis, kidney disease, seizures, fever, confusion, neurologic symptoms, and others. Patients with severe allergies to penicillin medications are cautioned that there is about a 10% incidence of cross-reactivity with cephalosporins. When possible, patients with penicillin allergies should use alternatives to cephalosporins for antibiotic therapy. Elidel Counseling: Patient may experience a mild burning sensation during topical application. Elidel is not approved in children less than 2 years of age. There have been case reports of hematologic and skin malignancies in patients using topical calcineurin inhibitors although causality is questionable. Clofazimine Counseling:  I discussed with the patient the risks of clofazimine including but not limited to skin and eye pigmentation, liver damage, nausea/vomiting, gastrointestinal bleeding and allergy. Prednisone Counseling:  I discussed with the patient the risks of prolonged use of prednisone including but not limited to weight gain, insomnia, osteoporosis, mood changes, diabetes, susceptibility to infection, glaucoma and high blood pressure.  In cases where prednisone use is prolonged, patients should be monitored with blood pressure checks, serum glucose levels and an eye exam.  Additionally, the patient may need to be placed on GI prophylaxis, PCP prophylaxis, and calcium and vitamin D supplementation and/or a bisphosphonate.  The patient verbalized understanding of the proper use and the possible adverse effects of prednisone.  All of the patient's questions and concerns were addressed. Ketoconazole Counseling:   Patient counseled regarding improving absorption with orange juice.  Adverse effects include but are not limited to breast enlargement, headache, diarrhea, nausea, upset stomach, liver function test abnormalities, taste disturbance, and stomach pain.  There is a rare possibility of liver failure that can occur when taking ketoconazole. The patient understands that monitoring of LFTs may be required, especially at baseline. The patient verbalized understanding of the proper use and possible adverse effects of ketoconazole.  All of the patient's questions and concerns were addressed. Wartpeel Counseling:  I discussed with the patient the risks of Wartpeel including but not limited to erythema, scaling, itching, weeping, crusting, and pain. Minocycline Counseling: Patient advised regarding possible photosensitivity and discoloration of the teeth, skin, lips, tongue and gums.  Patient instructed to avoid sunlight, if possible.  When exposed to sunlight, patients should wear protective clothing, sunglasses, and sunscreen.  The patient was instructed to call the office immediately if the following severe adverse effects occur:  hearing changes, easy bruising/bleeding, severe headache, or vision changes.  The patient verbalized understanding of the proper use and possible adverse effects of minocycline.  All of the patient's questions and concerns were addressed. Topical Ketoconazole Counseling: Patient counseled that this medication may cause skin irritation or allergic reactions.  In the event of skin irritation, the patient was advised to reduce the amount of the drug applied or use it less frequently.   The patient verbalized understanding of the proper use and possible adverse effects of ketoconazole.  All of the patient's questions and concerns were addressed. Sarecycline Counseling: Patient advised regarding possible photosensitivity and discoloration of the teeth, skin, lips, tongue and gums.  Patient instructed to avoid sunlight, if possible.  When exposed to sunlight, patients should wear protective clothing, sunglasses, and sunscreen.  The patient was instructed to call the office immediately if the following severe adverse effects occur:  hearing changes, easy bruising/bleeding, severe headache, or vision changes.  The patient verbalized understanding of the proper use and possible adverse effects of sarecycline.  All of the patient's questions and concerns were addressed. Griseofulvin Pregnancy And Lactation Text: This medication is Pregnancy Category X and is known to cause serious birth defects. It is unknown if this medication is excreted in breast milk but breast feeding should be avoided. Siliq Counseling:  I discussed with the patient the risks of Siliq including but not limited to new or worsening depression, suicidal thoughts and behavior, immunosuppression, malignancy, posterior leukoencephalopathy syndrome, and serious infections.  The patient understands that monitoring is required including a PPD at baseline and must alert us or the primary physician if symptoms of infection or other concerning signs are noted. There is also a special program designed to monitor depression which is required with Siliq. Erythromycin Counseling:  I discussed with the patient the risks of erythromycin including but not limited to GI upset, allergic reaction, drug rash, diarrhea, increase in liver enzymes, and yeast infections. Topical Ketoconazole Pregnancy And Lactation Text: This medication is Pregnancy Category B and is considered safe during pregnancy. It is unknown if it is excreted in breast milk. Include Pregnancy/Lactation Warning?: No Mirvaso Counseling: Mirvaso is a topical medication which can decrease superficial blood flow where applied. Side effects are uncommon and include stinging, redness and allergic reactions. Gabapentin Counseling: I discussed with the patient the risks of gabapentin including but not limited to dizziness, somnolence, fatigue and ataxia. Thalidomide Counseling: I discussed with the patient the risks of thalidomide including but not limited to birth defects, anxiety, weakness, chest pain, dizziness, cough and severe allergy. Nsaids Pregnancy And Lactation Text: These medications are considered safe up to 30 weeks gestation. It is excreted in breast milk. Xeljhonz Pregnancy And Lactation Text: This medication is Pregnancy Category D and is not considered safe during pregnancy.  The risk during breast feeding is also uncertain. Acitretin Counseling:  I discussed with the patient the risks of acitretin including but not limited to hair loss, dry lips/skin/eyes, liver damage, hyperlipidemia, depression/suicidal ideation, photosensitivity.  Serious rare side effects can include but are not limited to pancreatitis, pseudotumor cerebri, bony changes, clot formation/stroke/heart attack.  Patient understands that alcohol is contraindicated since it can result in liver toxicity and significantly prolong the elimination of the drug by many years. High Dose Vitamin A Pregnancy And Lactation Text: High dose vitamin A therapy is contraindicated during pregnancy and breast feeding. Tazorac Pregnancy And Lactation Text: This medication is not safe during pregnancy. It is unknown if this medication is excreted in breast milk. Cibinqo Counseling: I discussed with the patient the risks of Cibinqo therapy including but not limited to common cold, nausea, headache, cold sores, increased blood CPK levels, dizziness, UTIs, fatigue, acne, and vomitting. Live vaccines should be avoided.  This medication has been linked to serious infections; higher rate of mortality; malignancy and lymphoproliferative disorders; major adverse cardiovascular events; thrombosis; thrombocytopenia and lymphopenia; lipid elevations; and retinal detachment. Dutasteride Male Counseling: Dustasteride Counseling:  I discussed with the patient the risks of use of dutasteride including but not limited to decreased libido, decreased ejaculate volume, and gynecomastia. Women who can become pregnant should not handle medication.  All of the patient's questions and concerns were addressed. Odomzo Counseling- I discussed with the patient the risks of Odomzo including but not limited to nausea, vomiting, diarrhea, constipation, weight loss, changes in the sense of taste, decreased appetite, muscle spasms, and hair loss.  The patient verbalized understanding of the proper use and possible adverse effects of Odomzo.  All of the patient's questions and concerns were addressed. Dutasteride Pregnancy And Lactation Text: This medication is absolutely contraindicated in women, especially during pregnancy and breast feeding. Feminization of male fetuses is possible if taking while pregnant. Elidel Pregnancy And Lactation Text: This medication is Pregnancy Category C. It is unknown if this medication is excreted in breast milk. Ivermectin Counseling:  Patient instructed to take medication on an empty stomach with a full glass of water.  Patient informed of potential adverse effects including but not limited to nausea, diarrhea, dizziness, itching, and swelling of the extremities or lymph nodes.  The patient verbalized understanding of the proper use and possible adverse effects of ivermectin.  All of the patient's questions and concerns were addressed. Topical Clindamycin Counseling: Patient counseled that this medication may cause skin irritation or allergic reactions.  In the event of skin irritation, the patient was advised to reduce the amount of the drug applied or use it less frequently.   The patient verbalized understanding of the proper use and possible adverse effects of clindamycin.  All of the patient's questions and concerns were addressed. Klisyri Pregnancy And Lactation Text: It is unknown if this medication can harm a developing fetus or if it is excreted in breast milk. Cimzia Pregnancy And Lactation Text: This medication crosses the placenta but can be considered safe in certain situations. Cimzia may be excreted in breast milk. Propranolol Counseling:  I discussed with the patient the risks of propranolol including but not limited to low heart rate, low blood pressure, low blood sugar, restlessness and increased cold sensitivity. They should call the office if they experience any of these side effects. Rifampin Counseling: I discussed with the patient the risks of rifampin including but not limited to liver damage, kidney damage, red-orange body fluids, nausea/vomiting and severe allergy. Tranexamic Acid Pregnancy And Lactation Text: It is unknown if this medication is safe during pregnancy or breast feeding. Rinvoq Pregnancy And Lactation Text: Based on animal studies, Rinvoq may cause embryo-fetal harm when administered to pregnant women.  The medication should not be used in pregnancy.  Breastfeeding is not recommended during treatment and for 6 days after the last dose. Opioid Counseling: I discussed with the patient the potential side effects of opioids including but not limited to addiction, altered mental status, and depression. I stressed avoiding alcohol, benzodiazepines, muscle relaxants and sleep aids unless specifically okayed by a physician. The patient verbalized understanding of the proper use and possible adverse effects of opioids. All of the patient's questions and concerns were addressed. They were instructed to flush the remaining pills down the toilet if they did not need them for pain. Adbry Counseling: I discussed with the patient the risks of tralokinumab including but not limited to eye infection and irritation, cold sores, injection site reactions, worsening of asthma, allergic reactions and increased risk of parasitic infection.  Live vaccines should be avoided while taking tralokinumab. The patient understands that monitoring is required and they must alert us or the primary physician if symptoms of infection or other concerning signs are noted. Aklief counseling:  Patient advised to apply a pea-sized amount only at bedtime and wait 30 minutes after washing their face before applying.  If too drying, patient may add a non-comedogenic moisturizer.  The most commonly reported side effects including irritation, redness, scaling, dryness, stinging, burning, itching, and increased risk of sunburn.  The patient verbalized understanding of the proper use and possible adverse effects of retinoids.  All of the patient's questions and concerns were addressed. Colchicine Counseling:  Patient counseled regarding adverse effects including but not limited to stomach upset (nausea, vomiting, stomach pain, or diarrhea).  Patient instructed to limit alcohol consumption while taking this medication.  Colchicine may reduce blood counts especially with prolonged use.  The patient understands that monitoring of kidney function and blood counts may be required, especially at baseline. The patient verbalized understanding of the proper use and possible adverse effects of colchicine.  All of the patient's questions and concerns were addressed. Topical Metronidazole Pregnancy And Lactation Text: This medication is Pregnancy Category B and considered safe during pregnancy.  It is also considered safe to use while breastfeeding. Ketoconazole Pregnancy And Lactation Text: This medication is Pregnancy Category C and it isn't know if it is safe during pregnancy. It is also excreted in breast milk and breast feeding isn't recommended. Sotyktu Pregnancy And Lactation Text: There is insufficient data to evaluate whether or not Sotyktu is safe to use during pregnancy.? ?It is not known if Sotyktu passes into breast milk and whether or not it is safe to use when breastfeeding.?? Topical Steroids Counseling: I discussed with the patient that prolonged use of topical steroids can result in the increased appearance of superficial blood vessels (telangiectasias), lightening (hypopigmentation) and thinning of the skin (atrophy).  Patient understands to avoid using high potency steroids in skin folds, the groin or the face.  The patient verbalized understanding of the proper use and possible adverse effects of topical steroids.  All of the patient's questions and concerns were addressed. Bexarotene Counseling:  I discussed with the patient the risks of bexarotene including but not limited to hair loss, dry lips/skin/eyes, liver abnormalities, hyperlipidemia, pancreatitis, depression/suicidal ideation, photosensitivity, drug rash/allergic reactions, hypothyroidism, anemia, leukopenia, infection, cataracts, and teratogenicity.  Patient understands that they will need regular blood tests to check lipid profile, liver function tests, white blood cell count, thyroid function tests and pregnancy test if applicable. Metronidazole Pregnancy And Lactation Text: This medication is Pregnancy Category B and considered safe during pregnancy.  It is also excreted in breast milk. Xolair Pregnancy And Lactation Text: This medication is Pregnancy Category B and is considered safe during pregnancy. This medication is excreted in breast milk. Calcipotriene Counseling:  I discussed with the patient the risks of calcipotriene including but not limited to erythema, scaling, itching, and irritation. Benzoyl Peroxide Pregnancy And Lactation Text: This medication is Pregnancy Category C. It is unknown if benzoyl peroxide is excreted in breast milk. Solaraze Pregnancy And Lactation Text: This medication is Pregnancy Category B and is considered safe. There is some data to suggest avoiding during the third trimester. It is unknown if this medication is excreted in breast milk. Azithromycin Pregnancy And Lactation Text: This medication is considered safe during pregnancy and is also secreted in breast milk. Rhofade Counseling: Rhofade is a topical medication which can decrease superficial blood flow where applied. Side effects are uncommon and include stinging, redness and allergic reactions. Soolantra Pregnancy And Lactation Text: This medication is Pregnancy Category C. This medication is considered safe during breast feeding. Birth Control Pills Counseling: Birth Control Pill Counseling: I discussed with the patient the potential side effects of OCPs including but not limited to increased risk of stroke, heart attack, thrombophlebitis, deep venous thrombosis, hepatic adenomas, breast changes, GI upset, headaches, and depression.  The patient verbalized understanding of the proper use and possible adverse effects of OCPs. All of the patient's questions and concerns were addressed. Dapsone Counseling: I discussed with the patient the risks of dapsone including but not limited to hemolytic anemia, agranulocytosis, rashes, methemoglobinemia, kidney failure, peripheral neuropathy, headaches, GI upset, and liver toxicity.  Patients who start dapsone require monitoring including baseline LFTs and weekly CBCs for the first month, then every month thereafter.  The patient verbalized understanding of the proper use and possible adverse effects of dapsone.  All of the patient's questions and concerns were addressed. High Dose Vitamin A Counseling: Side effects reviewed, pt to contact office should one occur. Protopic Counseling: Patient may experience a mild burning sensation during topical application. Protopic is not approved in children less than 2 years of age. There have been case reports of hematologic and skin malignancies in patients using topical calcineurin inhibitors although causality is questionable. Erythromycin Pregnancy And Lactation Text: This medication is Pregnancy Category B and is considered safe during pregnancy. It is also excreted in breast milk. Glycopyrrolate Pregnancy And Lactation Text: This medication is Pregnancy Category B and is considered safe during pregnancy. It is unknown if it is excreted breast milk. Clindamycin Pregnancy And Lactation Text: This medication can be used in pregnancy if certain situations. Clindamycin is also present in breast milk. Hydroxyzine Counseling: Patient advised that the medication is sedating and not to drive a car after taking this medication.  Patient informed of potential adverse effects including but not limited to dry mouth, urinary retention, and blurry vision.  The patient verbalized understanding of the proper use and possible adverse effects of hydroxyzine.  All of the patient's questions and concerns were addressed. Detail Level: Simple Rituxan Counseling:  I discussed with the patient the risks of Rituxan infusions. Side effects can include infusion reactions, severe drug rashes including mucocutaneous reactions, reactivation of latent hepatitis and other infections and rarely progressive multifocal leukoencephalopathy.  All of the patient's questions and concerns were addressed. Solaraze Counseling:  I discussed with the patient the risks of Solaraze including but not limited to erythema, scaling, itching, weeping, crusting, and pain. Propranolol Pregnancy And Lactation Text: This medication is Pregnancy Category C and it isn't known if it is safe during pregnancy. It is excreted in breast milk. Azithromycin Counseling:  I discussed with the patient the risks of azithromycin including but not limited to GI upset, allergic reaction, drug rash, diarrhea, and yeast infections. Adbry Pregnancy And Lactation Text: It is unknown if this medication will adversely affect pregnancy or breast feeding. Rinvoq Counseling: I discussed with the patient the risks of Rinvoq therapy including but not limited to upper respiratory tract infections, shingles, cold sores, bronchitis, nausea, cough, fever, acne, and headache. Live vaccines should be avoided.  This medication has been linked to serious infections; higher rate of mortality; malignancy and lymphoproliferative disorders; major adverse cardiovascular events; thrombosis; thrombocytopenia, anemia, and neutropenia; lipid elevations; liver enzyme elevations; and gastrointestinal perforations. Taltz Counseling: I discussed with the patient the risks of ixekizumab including but not limited to immunosuppression, serious infections, worsening of inflammatory bowel disease and drug reactions.  The patient understands that monitoring is required including a PPD at baseline and must alert us or the primary physician if symptoms of infection or other concerning signs are noted. Spironolactone Counseling: Patient advised regarding risks of diarrhea, abdominal pain, hyperkalemia, birth defects (for female patients), liver toxicity and renal toxicity. The patient may need blood work to monitor liver and kidney function and potassium levels while on therapy. The patient verbalized understanding of the proper use and possible adverse effects of spironolactone.  All of the patient's questions and concerns were addressed. Cibinqo Pregnancy And Lactation Text: It is unknown if this medication will adversely affect pregnancy or breast feeding.  You should not take this medication if you are currently pregnant or planning a pregnancy or while breastfeeding. Oral Minoxidil Pregnancy And Lactation Text: This medication should only be used when clearly needed if you are pregnant, attempting to become pregnant or breast feeding. Tetracycline Counseling: Patient counseled regarding possible photosensitivity and increased risk for sunburn.  Patient instructed to avoid sunlight, if possible.  When exposed to sunlight, patients should wear protective clothing, sunglasses, and sunscreen.  The patient was instructed to call the office immediately if the following severe adverse effects occur:  hearing changes, easy bruising/bleeding, severe headache, or vision changes.  The patient verbalized understanding of the proper use and possible adverse effects of tetracycline.  All of the patient's questions and concerns were addressed. Patient understands to avoid pregnancy while on therapy due to potential birth defects. Isotretinoin Counseling: Patient should get monthly blood tests, not donate blood, not drive at night if vision affected, not share medication, and not undergo elective surgery for 6 months after tx completed. Side effects reviewed, pt to contact office should one occur. Cantharidin Pregnancy And Lactation Text: The use of this medication during pregnancy or lactation is not recommended as there is insufficient data. Itraconazole Pregnancy And Lactation Text: This medication is Pregnancy Category C and it isn't know if it is safe during pregnancy. It is also excreted in breast milk. Doxepin Counseling:  Patient advised that the medication is sedating and not to drive a car after taking this medication. Patient informed of potential adverse effects including but not limited to dry mouth, urinary retention, and blurry vision.  The patient verbalized understanding of the proper use and possible adverse effects of doxepin.  All of the patient's questions and concerns were addressed. Carac Counseling:  I discussed with the patient the risks of Carac including but not limited to erythema, scaling, itching, weeping, crusting, and pain. Sski Pregnancy And Lactation Text: This medication is Pregnancy Category D and isn't considered safe during pregnancy. It is excreted in breast milk. Zyclara Counseling:  I discussed with the patient the risks of imiquimod including but not limited to erythema, scaling, itching, weeping, crusting, and pain.  Patient understands that the inflammatory response to imiquimod is variable from person to person and was educated regarded proper titration schedule.  If flu-like symptoms develop, patient knows to discontinue the medication and contact us. Cimzia Counseling:  I discussed with the patient the risks of Cimzia including but not limited to immunosuppression, allergic reactions and infections.  The patient understands that monitoring is required including a PPD at baseline and must alert us or the primary physician if symptoms of infection or other concerning signs are noted. Fluconazole Counseling:  Patient counseled regarding adverse effects of fluconazole including but not limited to headache, diarrhea, nausea, upset stomach, liver function test abnormalities, taste disturbance, and stomach pain.  There is a rare possibility of liver failure that can occur when taking fluconazole.  The patient understands that monitoring of LFTs and kidney function test may be required, especially at baseline. The patient verbalized understanding of the proper use and possible adverse effects of fluconazole.  All of the patient's questions and concerns were addressed. Rifampin Pregnancy And Lactation Text: This medication is Pregnancy Category C and it isn't know if it is safe during pregnancy. It is also excreted in breast milk and should not be used if you are breast feeding. Olanzapine Pregnancy And Lactation Text: This medication is pregnancy category C.   There are no adequate and well controlled trials with olanzapine in pregnant females.  Olanzapine should be used during pregnancy only if the potential benefit justifies the potential risk to the fetus.   In a study in lactating healthy women, olanzapine was excreted in breast milk.  It is recommended that women taking olanzapine should not breast feed. Dupixent Counseling: I discussed with the patient the risks of dupilumab including but not limited to eye infection and irritation, cold sores, injection site reactions, worsening of asthma, allergic reactions and increased risk of parasitic infection.  Live vaccines should be avoided while taking dupilumab. Dupilumab will also interact with certain medications such as warfarin and cyclosporine. The patient understands that monitoring is required and they must alert us or the primary physician if symptoms of infection or other concerning signs are noted. Arava Counseling:  Patient counseled regarding adverse effects of Arava including but not limited to nausea, vomiting, abnormalities in liver function tests. Patients may develop mouth sores, rash, diarrhea, and abnormalities in blood counts. The patient understands that monitoring is required including LFTs and blood counts.  There is a rare possibility of scarring of the liver and lung problems that can occur when taking methotrexate. Persistent nausea, loss of appetite, pale stools, dark urine, cough, and shortness of breath should be reported immediately. Patient advised to discontinue Arava treatment and consult with a physician prior to attempting conception. The patient will have to undergo a treatment to eliminate Arava from the body prior to conception. Cyclophosphamide Counseling:  I discussed with the patient the risks of cyclophosphamide including but not limited to hair loss, hormonal abnormalities, decreased fertility, abdominal pain, diarrhea, nausea and vomiting, bone marrow suppression and infection. The patient understands that monitoring is required while taking this medication. Olanzapine Counseling- I discussed with the patient the common side effects of olanzapine including but are not limited to: lack of energy, dry mouth, increased appetite, sleepiness, tremor, constipation, dizziness, changes in behavior, or restlessness.  Explained that teenagers are more likely to experience headaches, abdominal pain, pain in the arms or legs, tiredness, and sleepiness.  Serious side effects include but are not limited: increased risk of death in elderly patients who are confused, have memory loss, or dementia-related psychosis; hyperglycemia; increased cholesterol and triglycerides; and weight gain. Aklief Pregnancy And Lactation Text: It is unknown if this medication is safe to use during pregnancy.  It is unknown if this medication is excreted in breast milk.  Breastfeeding women should use the topical cream on the smallest area of the skin for the shortest time needed while breastfeeding.  Do not apply to nipple and areola. Griseofulvin Counseling:  I discussed with the patient the risks of griseofulvin including but not limited to photosensitivity, cytopenia, liver damage, nausea/vomiting and severe allergy.  The patient understands that this medication is best absorbed when taken with a fatty meal (e.g., ice cream or french fries). Simponi Counseling:  I discussed with the patient the risks of golimumab including but not limited to myelosuppression, immunosuppression, autoimmune hepatitis, demyelinating diseases, lymphoma, and serious infections.  The patient understands that monitoring is required including a PPD at baseline and must alert us or the primary physician if symptoms of infection or other concerning signs are noted. Low Dose Naltrexone Pregnancy And Lactation Text: Naltrexone is pregnancy category C.  There have been no adequate and well-controlled studies in pregnant women.  It should be used in pregnancy only if the potential benefit justifies the potential risk to the fetus.   Limited data indicates that naltrexone is minimally excreted into breastmilk. Niacinamide Counseling: I recommended taking niacin or niacinamide, also know as vitamin B3, twice daily. Recent evidence suggests that taking vitamin B3 (500 mg twice daily) can reduce the risk of actinic keratoses and non-melanoma skin cancers. Side effects of vitamin B3 include flushing and headache. Albendazole Pregnancy And Lactation Text: This medication is Pregnancy Category C and it isn't known if it is safe during pregnancy. It is also excreted in breast milk. Valtrex Counseling: I discussed with the patient the risks of valacyclovir including but not limited to kidney damage, nausea, vomiting and severe allergy.  The patient understands that if the infection seems to be worsening or is not improving, they are to call. Azathioprine Counseling:  I discussed with the patient the risks of azathioprine including but not limited to myelosuppression, immunosuppression, hepatotoxicity, lymphoma, and infections.  The patient understands that monitoring is required including baseline LFTs, Creatinine, possible TPMP genotyping and weekly CBCs for the first month and then every 2 weeks thereafter.  The patient verbalized understanding of the proper use and possible adverse effects of azathioprine.  All of the patient's questions and concerns were addressed. Azelaic Acid Counseling: Patient counseled that medicine may cause skin irritation and to avoid applying near the eyes.  In the event of skin irritation, the patient was advised to reduce the amount of the drug applied or use it less frequently.   The patient verbalized understanding of the proper use and possible adverse effects of azelaic acid.  All of the patient's questions and concerns were addressed. Dapsone Pregnancy And Lactation Text: This medication is Pregnancy Category C and is not considered safe during pregnancy or breast feeding. Methotrexate Counseling:  Patient counseled regarding adverse effects of methotrexate including but not limited to nausea, vomiting, abnormalities in liver function tests. Patients may develop mouth sores, rash, diarrhea, and abnormalities in blood counts. The patient understands that monitoring is required including LFT's and blood counts.  There is a rare possibility of scarring of the liver and lung problems that can occur when taking methotrexate. Persistent nausea, loss of appetite, pale stools, dark urine, cough, and shortness of breath should be reported immediately. Patient advised to discontinue methotrexate treatment at least three months before attempting to become pregnant.  I discussed the need for folate supplements while taking methotrexate.  These supplements can decrease side effects during methotrexate treatment. The patient verbalized understanding of the proper use and possible adverse effects of methotrexate.  All of the patient's questions and concerns were addressed. Soolantra Counseling: I discussed with the patients the risks of topial Soolantra. This is a medicine which decreases the number of mites and inflammation in the skin. You experience burning, stinging, eye irritation or allergic reactions.  Please call our office if you develop any problems from using this medication. Olumiant Counseling: I discussed with the patient the risks of Olumiant therapy including but not limited to upper respiratory tract infections, shingles, cold sores, and nausea. Live vaccines should be avoided.  This medication has been linked to serious infections; higher rate of mortality; malignancy and lymphoproliferative disorders; major adverse cardiovascular events; thrombosis; gastrointestinal perforations; neutropenia; lymphopenia; anemia; liver enzyme elevations; and lipid elevations. Ilumya Counseling: I discussed with the patient the risks of tildrakizumab including but not limited to immunosuppression, malignancy, posterior leukoencephalopathy syndrome, and serious infections.  The patient understands that monitoring is required including a PPD at baseline and must alert us or the primary physician if symptoms of infection or other concerning signs are noted. Methotrexate Pregnancy And Lactation Text: This medication is Pregnancy Category X and is known to cause fetal harm. This medication is excreted in breast milk. Skyrizi Counseling: I discussed with the patient the risks of risankizumab-rzaa including but not limited to immunosuppression, and serious infections.  The patient understands that monitoring is required including a PPD at baseline and must alert us or the primary physician if symptoms of infection or other concerning signs are noted. Cimetidine Counseling:  I discussed with the patient the risks of Cimetidine including but not limited to gynecomastia, headache, diarrhea, nausea, drowsiness, arrhythmias, pancreatitis, skin rashes, psychosis, bone marrow suppression and kidney toxicity. Cosentyx Counseling:  I discussed with the patient the risks of Cosentyx including but not limited to worsening of Crohn's disease, immunosuppression, allergic reactions and infections.  The patient understands that monitoring is required including a PPD at baseline and must alert us or the primary physician if symptoms of infection or other concerning signs are noted. Birth Control Pills Pregnancy And Lactation Text: This medication should be avoided if pregnant and for the first 30 days post-partum. Opzelura Counseling:  I discussed with the patient the risks of Opzelura including but not limited to nasopharngitis, bronchitis, ear infection, eosinophila, hives, diarrhea, folliculitis, tonsillitis, and rhinorrhea.  Taken orally, this medication has been linked to serious infections; higher rate of mortality; malignancy and lymphoproliferative disorders; major adverse cardiovascular events; thrombosis; thrombocytopenia, anemia, and neutropenia; and lipid elevations. Doxepin Pregnancy And Lactation Text: This medication is Pregnancy Category C and it isn't known if it is safe during pregnancy. It is also excreted in breast milk and breast feeding isn't recommended. Finasteride Male Counseling: Finasteride Counseling:  I discussed with the patient the risks of use of finasteride including but not limited to decreased libido, decreased ejaculate volume, gynecomastia, and depression. Women should not handle medication.  All of the patient's questions and concerns were addressed. Hydroxychloroquine Counseling:  I discussed with the patient that a baseline ophthalmologic exam is needed at the start of therapy and every year thereafter while on therapy. A CBC may also be warranted for monitoring.  The side effects of this medication were discussed with the patient, including but not limited to agranulocytosis, aplastic anemia, seizures, rashes, retinopathy, and liver toxicity. Patient instructed to call the office should any adverse effect occur.  The patient verbalized understanding of the proper use and possible adverse effects of Plaquenil.  All the patient's questions and concerns were addressed. Tremfya Counseling: I discussed with the patient the risks of guselkumab including but not limited to immunosuppression, serious infections, and drug reactions.  The patient understands that monitoring is required including a PPD at baseline and must alert us or the primary physician if symptoms of infection or other concerning signs are noted. Tazorac Counseling:  Patient advised that medication is irritating and drying.  Patient may need to apply sparingly and wash off after an hour before eventually leaving it on overnight.  The patient verbalized understanding of the proper use and possible adverse effects of tazorac.  All of the patient's questions and concerns were addressed. Protopic Pregnancy And Lactation Text: This medication is Pregnancy Category C. It is unknown if this medication is excreted in breast milk when applied topically. Acitretin Pregnancy And Lactation Text: This medication is Pregnancy Category X and should not be given to women who are pregnant or may become pregnant in the future. This medication is excreted in breast milk. Stelara Counseling:  I discussed with the patient the risks of ustekinumab including but not limited to immunosuppression, malignancy, posterior leukoencephalopathy syndrome, and serious infections.  The patient understands that monitoring is required including a PPD at baseline and must alert us or the primary physician if symptoms of infection or other concerning signs are noted. Opzelura Pregnancy And Lactation Text: There is insufficient data to evaluate drug-associated risk for major birth defects, miscarriage, or other adverse maternal or fetal outcomes.  There is a pregnancy registry that monitors pregnancy outcomes in pregnant persons exposed to the medication during pregnancy.  It is unknown if this medication is excreted in breast milk.  Do not breastfeed during treatment and for about 4 weeks after the last dose. Sotyktu Counseling:  I discussed the most common side effects of Sotyktu including: common cold, sore throat, sinus infections, cold sores, canker sores, folliculitis, and acne.? I also discussed more serious side effects of Sotyktu including but not limited to: serious allergic reactions; increased risk for infections such as TB; cancers such as lymphomas; rhabdomyolysis and elevated CPK; and elevated triglycerides and liver enzymes.? Dupixent Pregnancy And Lactation Text: This medication likely crosses the placenta but the risk for the fetus is uncertain. This medication is excreted in breast milk.

## 2024-06-01 NOTE — PATIENT PROFILE ADULT - FALL HARM RISK - HARM RISK INTERVENTIONS

## 2024-06-01 NOTE — PATIENT PROFILE ADULT - NSPROPOAPRESSUREINJURY_GEN_A_NUR
Palliative following for pain management  On discharge, to follow up with oncology  Page for uncontrolled symptoms no

## 2024-06-01 NOTE — ED ADULT NURSE NOTE - NS ED PATIENT SAFETY CONCERN
[facility]    Progress Note    Brenda Ray Patient Status:  Inpatient    1937 MRN [id number]   Location [facility] 2W/SW Attending Kelsie Mcbride DO   Hosp Day # 16 PCP No primary care provider on file.          Jalen effort  CV: Heart with regular rate and rhythm, Nl S1,S2 ,no S3 or murmur  Abd: Abdomen soft, nontender, nondistended, bowel sounds present  Ext:  no clubbing, no cyanosis,no edema  Neuro: no focal deficits  Skin: no rashes or lesions    Scheduled Meds: left base consolidation/atelectasis with suspect posterior layering left pleural effusion. Right lung field is clear. Can't exclude left basal pneumonia. Correlate clinically and follow-up studies are advised.     Dictated by (CST): Leighann Fairbanks MD on No

## 2024-06-02 LAB
ALBUMIN SERPL ELPH-MCNC: 2.4 G/DL — LOW (ref 3.5–5.2)
ALP SERPL-CCNC: 73 U/L — SIGNIFICANT CHANGE UP (ref 30–115)
ALT FLD-CCNC: 10 U/L — SIGNIFICANT CHANGE UP (ref 0–41)
ANION GAP SERPL CALC-SCNC: 7 MMOL/L — SIGNIFICANT CHANGE UP (ref 7–14)
ANION GAP SERPL CALC-SCNC: 9 MMOL/L — SIGNIFICANT CHANGE UP (ref 7–14)
APPEARANCE UR: CLEAR — SIGNIFICANT CHANGE UP
AST SERPL-CCNC: 11 U/L — SIGNIFICANT CHANGE UP (ref 0–41)
BILIRUB SERPL-MCNC: <0.2 MG/DL — SIGNIFICANT CHANGE UP (ref 0.2–1.2)
BILIRUB UR-MCNC: NEGATIVE — SIGNIFICANT CHANGE UP
BUN SERPL-MCNC: <3 MG/DL — LOW (ref 10–20)
BUN SERPL-MCNC: <3 MG/DL — LOW (ref 10–20)
CALCIUM SERPL-MCNC: 7.9 MG/DL — LOW (ref 8.4–10.5)
CALCIUM SERPL-MCNC: 8.2 MG/DL — LOW (ref 8.4–10.5)
CHLORIDE SERPL-SCNC: 101 MMOL/L — SIGNIFICANT CHANGE UP (ref 98–110)
CHLORIDE SERPL-SCNC: 104 MMOL/L — SIGNIFICANT CHANGE UP (ref 98–110)
CO2 SERPL-SCNC: 28 MMOL/L — SIGNIFICANT CHANGE UP (ref 17–32)
CO2 SERPL-SCNC: 29 MMOL/L — SIGNIFICANT CHANGE UP (ref 17–32)
COLOR SPEC: YELLOW — SIGNIFICANT CHANGE UP
CREAT SERPL-MCNC: <0.5 MG/DL — LOW (ref 0.7–1.5)
CREAT SERPL-MCNC: <0.5 MG/DL — LOW (ref 0.7–1.5)
DIFF PNL FLD: NEGATIVE — SIGNIFICANT CHANGE UP
EGFR: 124 ML/MIN/1.73M2 — SIGNIFICANT CHANGE UP
EGFR: 124 ML/MIN/1.73M2 — SIGNIFICANT CHANGE UP
GLUCOSE SERPL-MCNC: 114 MG/DL — HIGH (ref 70–99)
GLUCOSE SERPL-MCNC: 97 MG/DL — SIGNIFICANT CHANGE UP (ref 70–99)
GLUCOSE UR QL: NEGATIVE MG/DL — SIGNIFICANT CHANGE UP
HCT VFR BLD CALC: 25.5 % — LOW (ref 37–47)
HGB BLD-MCNC: 7.9 G/DL — LOW (ref 12–16)
KETONES UR-MCNC: NEGATIVE MG/DL — SIGNIFICANT CHANGE UP
LACTATE SERPL-SCNC: 1.9 MMOL/L — SIGNIFICANT CHANGE UP (ref 0.7–2)
LEUKOCYTE ESTERASE UR-ACNC: ABNORMAL
MAGNESIUM SERPL-MCNC: 1.6 MG/DL — LOW (ref 1.8–2.4)
MAGNESIUM SERPL-MCNC: 2.2 MG/DL — SIGNIFICANT CHANGE UP (ref 1.8–2.4)
MCHC RBC-ENTMCNC: 28.9 PG — SIGNIFICANT CHANGE UP (ref 27–31)
MCHC RBC-ENTMCNC: 31 G/DL — LOW (ref 32–37)
MCV RBC AUTO: 93.4 FL — SIGNIFICANT CHANGE UP (ref 81–99)
NITRITE UR-MCNC: NEGATIVE — SIGNIFICANT CHANGE UP
NRBC # BLD: 0 /100 WBCS — SIGNIFICANT CHANGE UP (ref 0–0)
PH UR: 7.5 — SIGNIFICANT CHANGE UP (ref 5–8)
PHOSPHATE SERPL-MCNC: 2.3 MG/DL — SIGNIFICANT CHANGE UP (ref 2.1–4.9)
PLATELET # BLD AUTO: 322 K/UL — SIGNIFICANT CHANGE UP (ref 130–400)
PMV BLD: 9.9 FL — SIGNIFICANT CHANGE UP (ref 7.4–10.4)
POTASSIUM SERPL-MCNC: 2.9 MMOL/L — LOW (ref 3.5–5)
POTASSIUM SERPL-MCNC: 3.7 MMOL/L — SIGNIFICANT CHANGE UP (ref 3.5–5)
POTASSIUM SERPL-SCNC: 2.9 MMOL/L — LOW (ref 3.5–5)
POTASSIUM SERPL-SCNC: 3.7 MMOL/L — SIGNIFICANT CHANGE UP (ref 3.5–5)
PROCALCITONIN SERPL-MCNC: 0.28 NG/ML — HIGH (ref 0.02–0.1)
PROT SERPL-MCNC: 5.3 G/DL — LOW (ref 6–8)
PROT UR-MCNC: SIGNIFICANT CHANGE UP MG/DL
RAPID RVP RESULT: SIGNIFICANT CHANGE UP
RBC # BLD: 2.73 M/UL — LOW (ref 4.2–5.4)
RBC # FLD: 18.3 % — HIGH (ref 11.5–14.5)
SARS-COV-2 RNA SPEC QL NAA+PROBE: SIGNIFICANT CHANGE UP
SODIUM SERPL-SCNC: 139 MMOL/L — SIGNIFICANT CHANGE UP (ref 135–146)
SODIUM SERPL-SCNC: 139 MMOL/L — SIGNIFICANT CHANGE UP (ref 135–146)
SP GR SPEC: 1.01 — SIGNIFICANT CHANGE UP (ref 1–1.03)
UROBILINOGEN FLD QL: 0.2 MG/DL — SIGNIFICANT CHANGE UP (ref 0.2–1)
WBC # BLD: 9.81 K/UL — SIGNIFICANT CHANGE UP (ref 4.8–10.8)
WBC # FLD AUTO: 9.81 K/UL — SIGNIFICANT CHANGE UP (ref 4.8–10.8)

## 2024-06-02 PROCEDURE — 99233 SBSQ HOSP IP/OBS HIGH 50: CPT

## 2024-06-02 PROCEDURE — 71045 X-RAY EXAM CHEST 1 VIEW: CPT | Mod: 26

## 2024-06-02 RX ORDER — POTASSIUM CHLORIDE 20 MEQ
20 PACKET (EA) ORAL ONCE
Refills: 0 | Status: DISCONTINUED | OUTPATIENT
Start: 2024-06-02 | End: 2024-06-02

## 2024-06-02 RX ORDER — MAGNESIUM SULFATE 500 MG/ML
2 VIAL (ML) INJECTION ONCE
Refills: 0 | Status: COMPLETED | OUTPATIENT
Start: 2024-06-02 | End: 2024-06-02

## 2024-06-02 RX ORDER — MIDODRINE HYDROCHLORIDE 2.5 MG/1
10 TABLET ORAL EVERY 8 HOURS
Refills: 0 | Status: DISCONTINUED | OUTPATIENT
Start: 2024-06-02 | End: 2024-06-13

## 2024-06-02 RX ORDER — SODIUM CHLORIDE 9 MG/ML
500 INJECTION, SOLUTION INTRAVENOUS ONCE
Refills: 0 | Status: COMPLETED | OUTPATIENT
Start: 2024-06-02 | End: 2024-06-02

## 2024-06-02 RX ORDER — POTASSIUM CHLORIDE 20 MEQ
40 PACKET (EA) ORAL ONCE
Refills: 0 | Status: COMPLETED | OUTPATIENT
Start: 2024-06-02 | End: 2024-06-02

## 2024-06-02 RX ADMIN — GABAPENTIN 300 MILLIGRAM(S): 400 CAPSULE ORAL at 06:27

## 2024-06-02 RX ADMIN — Medication 40 MILLIEQUIVALENT(S): at 06:31

## 2024-06-02 RX ADMIN — MIDODRINE HYDROCHLORIDE 10 MILLIGRAM(S): 2.5 TABLET ORAL at 08:50

## 2024-06-02 RX ADMIN — MEROPENEM 280 MILLIGRAM(S): 1 INJECTION INTRAVENOUS at 06:27

## 2024-06-02 RX ADMIN — LINEZOLID 300 MILLIGRAM(S): 600 INJECTION, SOLUTION INTRAVENOUS at 17:27

## 2024-06-02 RX ADMIN — Medication 3 MILLILITER(S): at 19:35

## 2024-06-02 RX ADMIN — HEPARIN SODIUM 5000 UNIT(S): 5000 INJECTION INTRAVENOUS; SUBCUTANEOUS at 17:26

## 2024-06-02 RX ADMIN — LEVETIRACETAM 750 MILLIGRAM(S): 250 TABLET, FILM COATED ORAL at 06:27

## 2024-06-02 RX ADMIN — MEROPENEM 280 MILLIGRAM(S): 1 INJECTION INTRAVENOUS at 21:54

## 2024-06-02 RX ADMIN — PANTOPRAZOLE SODIUM 40 MILLIGRAM(S): 20 TABLET, DELAYED RELEASE ORAL at 11:06

## 2024-06-02 RX ADMIN — HEPARIN SODIUM 5000 UNIT(S): 5000 INJECTION INTRAVENOUS; SUBCUTANEOUS at 06:27

## 2024-06-02 RX ADMIN — Medication 3 MILLILITER(S): at 07:50

## 2024-06-02 RX ADMIN — RALOXIFENE HYDROCHLORIDE 60 MILLIGRAM(S): 60 TABLET, COATED ORAL at 11:06

## 2024-06-02 RX ADMIN — Medication 3 MILLILITER(S): at 13:14

## 2024-06-02 RX ADMIN — MIDODRINE HYDROCHLORIDE 10 MILLIGRAM(S): 2.5 TABLET ORAL at 13:35

## 2024-06-02 RX ADMIN — Medication 25 GRAM(S): at 07:09

## 2024-06-02 RX ADMIN — MUPIROCIN 1 APPLICATION(S): 20 OINTMENT TOPICAL at 06:28

## 2024-06-02 RX ADMIN — MEROPENEM 280 MILLIGRAM(S): 1 INJECTION INTRAVENOUS at 13:27

## 2024-06-02 RX ADMIN — SODIUM CHLORIDE 500 MILLILITER(S): 9 INJECTION, SOLUTION INTRAVENOUS at 14:32

## 2024-06-02 RX ADMIN — GABAPENTIN 300 MILLIGRAM(S): 400 CAPSULE ORAL at 17:27

## 2024-06-02 RX ADMIN — MIDODRINE HYDROCHLORIDE 10 MILLIGRAM(S): 2.5 TABLET ORAL at 21:54

## 2024-06-02 RX ADMIN — LEVETIRACETAM 750 MILLIGRAM(S): 250 TABLET, FILM COATED ORAL at 17:27

## 2024-06-02 RX ADMIN — MUPIROCIN 1 APPLICATION(S): 20 OINTMENT TOPICAL at 17:27

## 2024-06-02 RX ADMIN — LINEZOLID 300 MILLIGRAM(S): 600 INJECTION, SOLUTION INTRAVENOUS at 06:27

## 2024-06-02 NOTE — PROGRESS NOTE ADULT - SUBJECTIVE AND OBJECTIVE BOX
Patient is a 58y old  Female who presents with a chief complaint of Hypoxia and Tachypnea (01 Jun 2024 09:10)      Over Night Events:  Patient seen and examined.   on NIV on and off   on levo 0.06    ROS:  See HPI    PHYSICAL EXAM    ICU Vital Signs Last 24 Hrs  T(C): 36.3 (02 Jun 2024 08:00), Max: 36.8 (01 Jun 2024 20:00)  T(F): 97.3 (02 Jun 2024 08:00), Max: 98.3 (01 Jun 2024 20:00)  HR: 110 (02 Jun 2024 07:00) (60 - 128)  BP: 117/69 (02 Jun 2024 07:00) (76/38 - 133/74)  BP(mean): 86 (02 Jun 2024 07:00) (53 - 105)  ABP: --  ABP(mean): --  RR: 25 (02 Jun 2024 07:00) (14 - 42)  SpO2: 94% (02 Jun 2024 07:00) (82% - 100%)    O2 Parameters below as of 02 Jun 2024 07:00  Patient On (Oxygen Delivery Method): BiPAP/CPAP            General: at baseline   HEENT:         face mask       Lymph Nodes: NO cervical LN   Lungs: Bilateral BS  Cardiovascular: Regular   Abdomen: Soft, Positive BS  Extremities: No clubbing   Skin: warm   Neurological:   Musculoskeletal: move all ext     I&O's Detail    01 Jun 2024 07:01  -  02 Jun 2024 07:00  --------------------------------------------------------  IN:    Enteral Tube Flush: 200 mL    IV PiggyBack: 1230 mL    Lactated Ringers: 1800 mL    Norepinephrine: 123.7 mL  Total IN: 3353.7 mL    OUT:    Intermittent Catheterization - Urethral (mL): 450 mL    Voided (mL): 2575 mL  Total OUT: 3025 mL    Total NET: 328.7 mL          LABS:                          7.9    9.81  )-----------( 322      ( 02 Jun 2024 05:12 )             25.5         02 Jun 2024 05:12    139    |  101    |  <3     ----------------------------<  114    2.9     |  29     |  <0.5     Ca    8.2        02 Jun 2024 05:12  Phos  2.3       02 Jun 2024 05:12  Mg     1.6       02 Jun 2024 05:12    TPro  5.3    /  Alb  2.4    /  TBili  <0.2   /  DBili  x      /  AST  11     /  ALT  10     /  AlkPhos  73     02 Jun 2024 05:12  Amylase x     lipase x                                                 PT/INR - ( 31 May 2024 19:16 )   PT: 15.90 sec;   INR: 1.39 ratio         PTT - ( 31 May 2024 19:16 )  PTT:32.7 sec                                       Urinalysis Basic - ( 02 Jun 2024 05:12 )    Color: x / Appearance: x / SG: x / pH: x  Gluc: 114 mg/dL / Ketone: x  / Bili: x / Urobili: x   Blood: x / Protein: x / Nitrite: x   Leuk Esterase: x / RBC: x / WBC x   Sq Epi: x / Non Sq Epi: x / Bacteria: x        Lactate, Blood: 1.9 mmol/L (06-02-24 @ 00:05)  Lactate, Blood: 1.4 mmol/L (05-31-24 @ 21:21)    CARDIAC MARKERS ( 01 Jun 2024 04:17 )  x     / x     / 32 U/L / x     / x                                                            Urinalysis with Rflx Culture (collected 02 Jun 2024 00:05)    Culture - Blood (collected 31 May 2024 19:16)  Source: .Blood Blood-Peripheral  Preliminary Report (02 Jun 2024 01:03):    No growth at 24 hours    Culture - Blood (collected 31 May 2024 19:16)  Source: .Blood Blood-Peripheral  Preliminary Report (02 Jun 2024 01:03):    No growth at 24 hours                                                                                       ABG - ( 01 Jun 2024 05:08 )  pH, Arterial: 7.46  pH, Blood: x     /  pCO2: 38    /  pO2: 334   / HCO3: 27    / Base Excess: 3.0   /  SaO2: 99.7                MEDICATIONS  (STANDING):  albuterol/ipratropium for Nebulization 3 milliLiter(s) Nebulizer every 6 hours  gabapentin 300 milliGRAM(s) Oral every 12 hours  heparin   Injectable 5000 Unit(s) SubCutaneous every 12 hours  lactated ringers. 1000 milliLiter(s) (75 mL/Hr) IV Continuous <Continuous>  levETIRAcetam  Solution 750 milliGRAM(s) Oral two times a day  linezolid  IVPB      linezolid  IVPB 600 milliGRAM(s) IV Intermittent every 12 hours  meropenem  IVPB 2000 milliGRAM(s) IV Intermittent every 8 hours  mupirocin 2% Nasal 1 Application(s) Both Nostrils every 12 hours  norepinephrine Infusion 0.05 MICROgram(s)/kG/Min (5.16 mL/Hr) IV Continuous <Continuous>  pantoprazole   Suspension 40 milliGRAM(s) Oral daily  raloxifene 60 milliGRAM(s) Oral daily    MEDICATIONS  (PRN):  acetaminophen     Tablet .. 650 milliGRAM(s) Oral every 6 hours PRN Temp greater or equal to 38C (100.4F), Mild Pain (1 - 3)          Xrays:  TLC:  OG:  ET tube:                                                                                    RLL opacity    ECHO:  [facility] ICU:

## 2024-06-02 NOTE — PROGRESS NOTE ADULT - ASSESSMENT
57 yo F with hx of down syndrome, anemia, cerebral palsy, seizure, osteoporosis, hypotension on midodrine, nonverbal at baseline who was BIBEMS from [facility] for respiratory distress. Per EMS, pt was satting 74% on 4L NC. Pt was admitted 4/16-5/29 for severe sepsis and respiratory failure 2/2 recurrent CAP. During admission, blood cx grew staph capitis and VRE which cleared after daptomycin and meropenem completed on 5/26. BRIANA was negative for endocarditis. Pt also had recurrent vomiting and aspiration despite PEG tube. Further hx limited as pt is nonverbal.     IMPRESSION:  Acute Hypoxemic Hypercapnic Respiratory Failure  Septic Shock   Pneumonia- possibly aspiration  Lactic Acidosis- Resolved  Normocytic Anemia  Hx of Down Syndrome  Hx of Cerebral Palsy/ Nonverbal at baseline  Hx of Seizures  Hx of OP      PLAN: see attestation note     CNS: keep off sedatives    HEENT: Oral Care    PULMONARY: HOB @ 45, aspiration precautions BIPAP as needed, try to wean off, Keep spo2 92 TO 96%. chest PT. Duonebs q6hrs and PRN.  NIV 4 on and 4 off at night and as needed   high flow if needed     CARDIOVASCULAR: Recent echo with normal EF, goal directed fluid resuscitation, d/c  LR at 75 cc/hr if tolerate feed   taper pressors for map 65   resume midodirne 10 mg Q8 hrs   GI: GI prophylaxis.  PEG feeding     RENAL: Strict In/out, keep euvolemic     INFECTIOUS DISEASE: Follow up blood culture. Sputum culture. IV abx: Meropenem, zyvox . Urine strep pneumo & legionella. Full RVP panel. MRSA nares. ID evaluation    HEMATOLOGICAL:  DVT prophylaxis.    ENDOCRINE:  Follow up FS.  Insulin protocol if needed.    MUSCULOSKELETAL: Bedrest    TLC on 5/31    Dispo: MICU

## 2024-06-03 LAB
ALBUMIN SERPL ELPH-MCNC: 2.4 G/DL — LOW (ref 3.5–5.2)
ALP SERPL-CCNC: 82 U/L — SIGNIFICANT CHANGE UP (ref 30–115)
ALT FLD-CCNC: 9 U/L — SIGNIFICANT CHANGE UP (ref 0–41)
ANION GAP SERPL CALC-SCNC: 6 MMOL/L — LOW (ref 7–14)
AST SERPL-CCNC: 10 U/L — SIGNIFICANT CHANGE UP (ref 0–41)
BASOPHILS # BLD AUTO: 0.06 K/UL — SIGNIFICANT CHANGE UP (ref 0–0.2)
BASOPHILS NFR BLD AUTO: 0.6 % — SIGNIFICANT CHANGE UP (ref 0–1)
BILIRUB SERPL-MCNC: <0.2 MG/DL — SIGNIFICANT CHANGE UP (ref 0.2–1.2)
BUN SERPL-MCNC: 5 MG/DL — LOW (ref 10–20)
CALCIUM SERPL-MCNC: 7.8 MG/DL — LOW (ref 8.4–10.5)
CHLORIDE SERPL-SCNC: 104 MMOL/L — SIGNIFICANT CHANGE UP (ref 98–110)
CO2 SERPL-SCNC: 30 MMOL/L — SIGNIFICANT CHANGE UP (ref 17–32)
CREAT SERPL-MCNC: <0.5 MG/DL — LOW (ref 0.7–1.5)
EGFR: 115 ML/MIN/1.73M2 — SIGNIFICANT CHANGE UP
EOSINOPHIL # BLD AUTO: 0.51 K/UL — SIGNIFICANT CHANGE UP (ref 0–0.7)
EOSINOPHIL NFR BLD AUTO: 5 % — SIGNIFICANT CHANGE UP (ref 0–8)
GLUCOSE SERPL-MCNC: 161 MG/DL — HIGH (ref 70–99)
HCT VFR BLD CALC: 26 % — LOW (ref 37–47)
HGB BLD-MCNC: 8.2 G/DL — LOW (ref 12–16)
IMM GRANULOCYTES NFR BLD AUTO: 0.4 % — HIGH (ref 0.1–0.3)
LYMPHOCYTES # BLD AUTO: 1.05 K/UL — LOW (ref 1.2–3.4)
LYMPHOCYTES # BLD AUTO: 10.2 % — LOW (ref 20.5–51.1)
MAGNESIUM SERPL-MCNC: 2.2 MG/DL — SIGNIFICANT CHANGE UP (ref 1.8–2.4)
MCHC RBC-ENTMCNC: 29.3 PG — SIGNIFICANT CHANGE UP (ref 27–31)
MCHC RBC-ENTMCNC: 31.5 G/DL — LOW (ref 32–37)
MCV RBC AUTO: 92.9 FL — SIGNIFICANT CHANGE UP (ref 81–99)
MONOCYTES # BLD AUTO: 0.4 K/UL — SIGNIFICANT CHANGE UP (ref 0.1–0.6)
MONOCYTES NFR BLD AUTO: 3.9 % — SIGNIFICANT CHANGE UP (ref 1.7–9.3)
NEUTROPHILS # BLD AUTO: 8.23 K/UL — HIGH (ref 1.4–6.5)
NEUTROPHILS NFR BLD AUTO: 79.9 % — HIGH (ref 42.2–75.2)
NRBC # BLD: 0 /100 WBCS — SIGNIFICANT CHANGE UP (ref 0–0)
PHOSPHATE SERPL-MCNC: 2.7 MG/DL — SIGNIFICANT CHANGE UP (ref 2.1–4.9)
PLATELET # BLD AUTO: 407 K/UL — HIGH (ref 130–400)
PMV BLD: 9.6 FL — SIGNIFICANT CHANGE UP (ref 7.4–10.4)
POTASSIUM SERPL-MCNC: 4 MMOL/L — SIGNIFICANT CHANGE UP (ref 3.5–5)
POTASSIUM SERPL-SCNC: 4 MMOL/L — SIGNIFICANT CHANGE UP (ref 3.5–5)
PROT SERPL-MCNC: 5.5 G/DL — LOW (ref 6–8)
RBC # BLD: 2.8 M/UL — LOW (ref 4.2–5.4)
RBC # FLD: 18.5 % — HIGH (ref 11.5–14.5)
SODIUM SERPL-SCNC: 140 MMOL/L — SIGNIFICANT CHANGE UP (ref 135–146)
WBC # BLD: 10.29 K/UL — SIGNIFICANT CHANGE UP (ref 4.8–10.8)
WBC # FLD AUTO: 10.29 K/UL — SIGNIFICANT CHANGE UP (ref 4.8–10.8)

## 2024-06-03 PROCEDURE — 99233 SBSQ HOSP IP/OBS HIGH 50: CPT

## 2024-06-03 PROCEDURE — 71045 X-RAY EXAM CHEST 1 VIEW: CPT | Mod: 26

## 2024-06-03 RX ORDER — SODIUM CHLORIDE 9 MG/ML
250 INJECTION INTRAMUSCULAR; INTRAVENOUS; SUBCUTANEOUS ONCE
Refills: 0 | Status: COMPLETED | OUTPATIENT
Start: 2024-06-03 | End: 2024-06-03

## 2024-06-03 RX ORDER — NYSTATIN CREAM 100000 [USP'U]/G
1 CREAM TOPICAL
Refills: 0 | Status: DISCONTINUED | OUTPATIENT
Start: 2024-06-03 | End: 2024-06-13

## 2024-06-03 RX ADMIN — MEROPENEM 280 MILLIGRAM(S): 1 INJECTION INTRAVENOUS at 06:24

## 2024-06-03 RX ADMIN — NYSTATIN CREAM 1 APPLICATION(S): 100000 CREAM TOPICAL at 17:07

## 2024-06-03 RX ADMIN — LINEZOLID 300 MILLIGRAM(S): 600 INJECTION, SOLUTION INTRAVENOUS at 06:24

## 2024-06-03 RX ADMIN — MIDODRINE HYDROCHLORIDE 10 MILLIGRAM(S): 2.5 TABLET ORAL at 21:30

## 2024-06-03 RX ADMIN — Medication 3 MILLILITER(S): at 02:33

## 2024-06-03 RX ADMIN — LEVETIRACETAM 750 MILLIGRAM(S): 250 TABLET, FILM COATED ORAL at 17:06

## 2024-06-03 RX ADMIN — GABAPENTIN 300 MILLIGRAM(S): 400 CAPSULE ORAL at 06:29

## 2024-06-03 RX ADMIN — MEROPENEM 280 MILLIGRAM(S): 1 INJECTION INTRAVENOUS at 15:03

## 2024-06-03 RX ADMIN — MIDODRINE HYDROCHLORIDE 10 MILLIGRAM(S): 2.5 TABLET ORAL at 06:29

## 2024-06-03 RX ADMIN — MIDODRINE HYDROCHLORIDE 10 MILLIGRAM(S): 2.5 TABLET ORAL at 15:03

## 2024-06-03 RX ADMIN — MUPIROCIN 1 APPLICATION(S): 20 OINTMENT TOPICAL at 06:30

## 2024-06-03 RX ADMIN — HEPARIN SODIUM 5000 UNIT(S): 5000 INJECTION INTRAVENOUS; SUBCUTANEOUS at 17:05

## 2024-06-03 RX ADMIN — Medication 3 MILLILITER(S): at 13:00

## 2024-06-03 RX ADMIN — SODIUM CHLORIDE 500 MILLILITER(S): 9 INJECTION INTRAMUSCULAR; INTRAVENOUS; SUBCUTANEOUS at 12:39

## 2024-06-03 RX ADMIN — MUPIROCIN 1 APPLICATION(S): 20 OINTMENT TOPICAL at 17:06

## 2024-06-03 RX ADMIN — RALOXIFENE HYDROCHLORIDE 60 MILLIGRAM(S): 60 TABLET, COATED ORAL at 12:22

## 2024-06-03 RX ADMIN — LEVETIRACETAM 750 MILLIGRAM(S): 250 TABLET, FILM COATED ORAL at 06:29

## 2024-06-03 RX ADMIN — Medication 3 MILLILITER(S): at 19:11

## 2024-06-03 RX ADMIN — GABAPENTIN 300 MILLIGRAM(S): 400 CAPSULE ORAL at 17:06

## 2024-06-03 RX ADMIN — SODIUM CHLORIDE 250 MILLILITER(S): 9 INJECTION INTRAMUSCULAR; INTRAVENOUS; SUBCUTANEOUS at 15:48

## 2024-06-03 RX ADMIN — HEPARIN SODIUM 5000 UNIT(S): 5000 INJECTION INTRAVENOUS; SUBCUTANEOUS at 06:30

## 2024-06-03 RX ADMIN — LINEZOLID 300 MILLIGRAM(S): 600 INJECTION, SOLUTION INTRAVENOUS at 17:06

## 2024-06-03 RX ADMIN — PANTOPRAZOLE SODIUM 40 MILLIGRAM(S): 20 TABLET, DELAYED RELEASE ORAL at 12:23

## 2024-06-03 RX ADMIN — Medication 3 MILLILITER(S): at 07:48

## 2024-06-03 RX ADMIN — MEROPENEM 280 MILLIGRAM(S): 1 INJECTION INTRAVENOUS at 22:01

## 2024-06-03 NOTE — PROGRESS NOTE ADULT - SUBJECTIVE AND OBJECTIVE BOX
Patient is a 58y old  Female who presents with a chief complaint of Hypoxia and Tachypnea (02 Jun 2024 08:07)      Over Night Events:  Patient seen and examined. still on levo at 0.06.  NIV at night and as needed     ROS:  See HPI    PHYSICAL EXAM    ICU Vital Signs Last 24 Hrs  T(C): 36.6 (03 Jun 2024 08:00), Max: 37.2 (02 Jun 2024 20:00)  T(F): 97.9 (03 Jun 2024 08:00), Max: 98.9 (02 Jun 2024 20:00)  HR: 91 (03 Jun 2024 07:00) (63 - 115)  BP: 106/56 (03 Jun 2024 07:00) (82/49 - 119/60)  BP(mean): 75 (03 Jun 2024 07:00) (61 - 84)  ABP: --  ABP(mean): --  RR: 22 (03 Jun 2024 07:00) (15 - 26)  SpO2: 100% (03 Jun 2024 07:00) (95% - 100%)    O2 Parameters below as of 03 Jun 2024 08:00  Patient On (Oxygen Delivery Method): mask, Venturi    O2 Concentration (%): 50        General: awake   HEENT: NCAT. conjunctiva normal      Lungs: Bilateral BS, mild crackles  Cardiovascular: S1 S2  Abdomen: Soft  Skin: warm   Neurological: at baseline    I&O's Detail    02 Jun 2024 07:01  -  03 Jun 2024 07:00  --------------------------------------------------------  IN:    Enteral Tube Flush: 300 mL    Free Water: 450 mL    IV PiggyBack: 720 mL    Jevity 1.2: 615 mL    Lactated Ringers: 75 mL    Lactated Ringers Bolus: 500 mL    Norepinephrine: 129.1 mL  Total IN: 2789.1 mL    OUT:    Voided (mL): 2450 mL  Total OUT: 2450 mL    Total NET: 339.1 mL          LABS:                          8.2    10.29 )-----------( 407      ( 03 Jun 2024 05:05 )             26.0         03 Jun 2024 05:05    140    |  104    |  5      ----------------------------<  161    4.0     |  30     |  <0.5     Ca    7.8        03 Jun 2024 05:05  Phos  2.7       03 Jun 2024 05:05  Mg     2.2       03 Jun 2024 05:05    TPro  5.5    /  Alb  2.4    /  TBili  <0.2   /  DBili  x      /  AST  10     /  ALT  9      /  AlkPhos  82     03 Jun 2024 05:05  Amylase x     lipase x                                                                                        Urinalysis Basic - ( 03 Jun 2024 05:05 )    Color: x / Appearance: x / SG: x / pH: x  Gluc: 161 mg/dL / Ketone: x  / Bili: x / Urobili: x   Blood: x / Protein: x / Nitrite: x   Leuk Esterase: x / RBC: x / WBC x   Sq Epi: x / Non Sq Epi: x / Bacteria: x        Lactate, Blood: 1.9 mmol/L (06-02-24 @ 00:05)  Lactate, Blood: 1.4 mmol/L (05-31-24 @ 21:21)                                                          Urinalysis with Rflx Culture (collected 02 Jun 2024 00:05)    Culture - Blood (collected 31 May 2024 19:16)  Source: .Blood Blood-Peripheral  Preliminary Report (03 Jun 2024 01:02):    No growth at 48 Hours    Culture - Blood (collected 31 May 2024 19:16)  Source: .Blood Blood-Peripheral  Preliminary Report (03 Jun 2024 01:02):    No growth at 48 Hours                                                                                           MEDICATIONS  (STANDING):  albuterol/ipratropium for Nebulization 3 milliLiter(s) Nebulizer every 6 hours  gabapentin 300 milliGRAM(s) Oral every 12 hours  heparin   Injectable 5000 Unit(s) SubCutaneous every 12 hours  levETIRAcetam  Solution 750 milliGRAM(s) Oral two times a day  linezolid  IVPB      linezolid  IVPB 600 milliGRAM(s) IV Intermittent every 12 hours  meropenem  IVPB 2000 milliGRAM(s) IV Intermittent every 8 hours  midodrine 10 milliGRAM(s) Oral every 8 hours  mupirocin 2% Nasal 1 Application(s) Both Nostrils every 12 hours  norepinephrine Infusion 0.05 MICROgram(s)/kG/Min (5.16 mL/Hr) IV Continuous <Continuous>  pantoprazole   Suspension 40 milliGRAM(s) Oral daily  raloxifene 60 milliGRAM(s) Oral daily    MEDICATIONS  (PRN):  acetaminophen     Tablet .. 650 milliGRAM(s) Oral every 6 hours PRN Temp greater or equal to 38C (100.4F), Mild Pain (1 - 3)       Over Night Events:  Patient seen and examined. still on levo at 0.06.  NIV at night and as needed     ANTICIPATING TRANSFER:  57 yo F with hx of down syndrome, anemia, cerebral palsy, seizure, osteoporosis, hypotension on midodrine, nonverbal at baseline who was BIBEMS from [facility] on 5/31 for respiratory distress. Per EMS, pt was satting 74% on 4L NC. Pt was admitted 4/16-5/29 for severe sepsis and respiratory failure 2/2 recurrent CAP. During admission, blood cx grew staph capitis and VRE which cleared after daptomycin and meropenem completed on 5/26. BRIANA was negative for endocarditis. Pt also had recurrent vomiting and aspiration despite PEG tube. Further hx limited as pt is nonverbal.     GOC as follows:  Emelyn RIVAS decided on DNR/DNI per their documentation letter. (GOC done on 2/29/2024)    In the ED, patient was given dapto and danna as per previous cultures. Patient was given 3 L of IV fluids and then started on levophed. Labs showed WBC of 17 with left shift. Lactate 3.7->1.4. ABG shows mild respiratory acidosis. BIPAP applied and patient improved. CT abdomen was done. Central line was placed in the right IJ for fluid resuscitation and hemodynamic stability.    In CCU (6/1-6/3) patient was weaned off of bipap and kept off sedatives. Peg tube fed. RVP negative but was found to be MRSA positive. Blood culture remains negative. Per ID, continue IV meropenem 2g q8hrs for septic shock. Vancomycin that patient was initially one was discontinued, and switched over to starting IV linezolid 600mg q12hrs. As of 6/3, patient hemodynamically stable on levo at 0.06 and  NIV at night and as needed     TO DO LIST:  -Follow up blood culture, negative as of 6/3  -Sputum culture pending collection  - Urine strep pneumo & legionella pending collection  -C/w IV abx: Meropenem, zyvox. F/u with ID      ROS:  See HPI    PHYSICAL EXAM    ICU Vital Signs Last 24 Hrs  T(C): 36.6 (03 Jun 2024 08:00), Max: 37.2 (02 Jun 2024 20:00)  T(F): 97.9 (03 Jun 2024 08:00), Max: 98.9 (02 Jun 2024 20:00)  HR: 91 (03 Jun 2024 07:00) (63 - 115)  BP: 106/56 (03 Jun 2024 07:00) (82/49 - 119/60)  BP(mean): 75 (03 Jun 2024 07:00) (61 - 84)  ABP: --  ABP(mean): --  RR: 22 (03 Jun 2024 07:00) (15 - 26)  SpO2: 100% (03 Jun 2024 07:00) (95% - 100%)    O2 Parameters below as of 03 Jun 2024 08:00  Patient On (Oxygen Delivery Method): mask, Venturi    O2 Concentration (%): 50        General: awake   HEENT: NCAT. conjunctiva normal      Lungs: Bilateral BS, mild crackles  Cardiovascular: S1 S2  Abdomen: Soft  Skin: warm   Neurological: at baseline    I&O's Detail    02 Jun 2024 07:01  -  03 Jun 2024 07:00  --------------------------------------------------------  IN:    Enteral Tube Flush: 300 mL    Free Water: 450 mL    IV PiggyBack: 720 mL    Jevity 1.2: 615 mL    Lactated Ringers: 75 mL    Lactated Ringers Bolus: 500 mL    Norepinephrine: 129.1 mL  Total IN: 2789.1 mL    OUT:    Voided (mL): 2450 mL  Total OUT: 2450 mL    Total NET: 339.1 mL          LABS:                          8.2    10.29 )-----------( 407      ( 03 Jun 2024 05:05 )             26.0         03 Jun 2024 05:05    140    |  104    |  5      ----------------------------<  161    4.0     |  30     |  <0.5     Ca    7.8        03 Jun 2024 05:05  Phos  2.7       03 Jun 2024 05:05  Mg     2.2       03 Jun 2024 05:05    TPro  5.5    /  Alb  2.4    /  TBili  <0.2   /  DBili  x      /  AST  10     /  ALT  9      /  AlkPhos  82     03 Jun 2024 05:05  Amylase x     lipase x                                                                                        Urinalysis Basic - ( 03 Jun 2024 05:05 )    Color: x / Appearance: x / SG: x / pH: x  Gluc: 161 mg/dL / Ketone: x  / Bili: x / Urobili: x   Blood: x / Protein: x / Nitrite: x   Leuk Esterase: x / RBC: x / WBC x   Sq Epi: x / Non Sq Epi: x / Bacteria: x        Lactate, Blood: 1.9 mmol/L (06-02-24 @ 00:05)  Lactate, Blood: 1.4 mmol/L (05-31-24 @ 21:21)                                                          Urinalysis with Rflx Culture (collected 02 Jun 2024 00:05)    Culture - Blood (collected 31 May 2024 19:16)  Source: .Blood Blood-Peripheral  Preliminary Report (03 Jun 2024 01:02):    No growth at 48 Hours    Culture - Blood (collected 31 May 2024 19:16)  Source: .Blood Blood-Peripheral  Preliminary Report (03 Jun 2024 01:02):    No growth at 48 Hours                                                                                           MEDICATIONS  (STANDING):  albuterol/ipratropium for Nebulization 3 milliLiter(s) Nebulizer every 6 hours  gabapentin 300 milliGRAM(s) Oral every 12 hours  heparin   Injectable 5000 Unit(s) SubCutaneous every 12 hours  levETIRAcetam  Solution 750 milliGRAM(s) Oral two times a day  linezolid  IVPB      linezolid  IVPB 600 milliGRAM(s) IV Intermittent every 12 hours  meropenem  IVPB 2000 milliGRAM(s) IV Intermittent every 8 hours  midodrine 10 milliGRAM(s) Oral every 8 hours  mupirocin 2% Nasal 1 Application(s) Both Nostrils every 12 hours  norepinephrine Infusion 0.05 MICROgram(s)/kG/Min (5.16 mL/Hr) IV Continuous <Continuous>  pantoprazole   Suspension 40 milliGRAM(s) Oral daily  raloxifene 60 milliGRAM(s) Oral daily    MEDICATIONS  (PRN):  acetaminophen     Tablet .. 650 milliGRAM(s) Oral every 6 hours PRN Temp greater or equal to 38C (100.4F), Mild Pain (1 - 3)

## 2024-06-03 NOTE — CONSULT NOTE ADULT - SUBJECTIVE AND OBJECTIVE BOX
Podiatry Consult Note    Subjective:  JERICHO TEA  Seen Bedside 58y Female  .   Patient is a 58y old  Female who presents with a chief complaint of Hypoxia and Tachypnea (03 Jun 2024 10:37)    HPI:   57 yo F with hx of down syndrome, anemia, cerebral palsy, seizure, osteoporosis, hypotension on midodrine, nonverbal at baseline who was BIBEMS from [facility] [place] for respiratory distress. Per EMS, pt was satting 74% on 4L NC. Pt was admitted 4/16-5/29 for severe sepsis and respiratory failure 2/2 recurrent CAP. During admission, blood cx grew staph capitis and VRE which cleared after daptomycin and meropenem completed on 5/26. BRIANA was negative for endocarditis. Pt also had recurrent vomiting and aspiration despite PEG tube. Further hx limited as pt is nonverbal.     GOC as follows:  Emelyn RIVAS decided on DNR/DNI per their documentation letter. (GOC done on 2/29/2024)    In the ED, patient was given dapto and danna as per previous cultures. Patient was given 3 L of IV fluids and then started on levophed. Labs showed WBC of 17 with left shift. Lactate 3.7->1.4. ABG shows mild respiratory acidosis. BIPAP applied and patient improved. CT abdomen was done < from: CT Abdomen and Pelvis w/ IV Cont (05.31.24 @ 20:26) >  IMPRESSION:    Slight interval increase bibasilar consolidative opacities, consistent   with pneumonia in the appropriate clinical setting.    No evidence of acute abdominal pathology.    < end of copied text >       (31 May 2024 23:37)      Past Medical History and Surgical History  PAST MEDICAL & SURGICAL HISTORY:  Down syndrome      Osteoporosis      Mild anemia      Neuropathy      Cerebral palsy      Seizure      Nonverbal      Hypotension  on midodrine      S/P debridement  of R hip on 3/2/21      S/P percutaneous endoscopic gastrostomy (PEG) tube placement           Review of Systems:  [X] Ten point review of systems is otherwise negative except as noted     Objective:  Vital Signs Last 24 Hrs  T(C): 36.9 (03 Jun 2024 16:00), Max: 37.2 (02 Jun 2024 20:00)  T(F): 98.5 (03 Jun 2024 16:00), Max: 98.9 (02 Jun 2024 20:00)  HR: 121 (03 Jun 2024 17:15) (63 - 122)  BP: 99/59 (03 Jun 2024 17:15) (73/38 - 119/60)  BP(mean): 70 (03 Jun 2024 17:15) (50 - 86)  RR: 26 (03 Jun 2024 17:15) (16 - 28)  SpO2: 97% (03 Jun 2024 17:15) (92% - 100%)    Parameters below as of 03 Jun 2024 17:15  Patient On (Oxygen Delivery Method): nasal cannula  O2 Flow (L/min): 3                          8.2    10.29 )-----------( 407      ( 03 Jun 2024 05:05 )             26.0                 06-03    140  |  104  |  5<L>  ----------------------------<  161<H>  4.0   |  30  |  <0.5<L>    Ca    7.8<L>      03 Jun 2024 05:05  Phos  2.7     06-03  Mg     2.2     06-03    TPro  5.5<L>  /  Alb  2.4<L>  /  TBili  <0.2  /  DBili  x   /  AST  10  /  ALT  9   /  AlkPhos  82  06-03        Physical Exam - Lower Extremity Focused:   Derm:  B/l partial thickness ulcerations, granular base, no drainage or SOI noted   Vascular: DP and PT Pulses palpable; Foot is Warm to Warm to the touch; Capillary Refill Time < 3 Seconds;    Neuro: Deferred   MSK: Contracted BLE    Assessment:   - B/l Pressure injury, not infected    Plan:  Chart reviewed and Patient evaluated. All Questions and Concerns Addressed and Answered  Local Wound Care; Xeroform, 4x4s, Abd, Kerlix to BL foot  No podiatric surgical intervention necessary at this time. Can follow up outpatient with Dr. Everett post discharge    Discussed Plan w/ Dr. Everett    Podiatry

## 2024-06-03 NOTE — PROGRESS NOTE ADULT - SUBJECTIVE AND OBJECTIVE BOX
Patient is a 58y old  Female who presents with a chief complaint of Hypoxia and Tachypnea (02 Jun 2024 08:07)      Over Night Events:  Patient seen and examined.   still on levo   NIV at night and as needed     ROS:  See HPI    PHYSICAL EXAM    ICU Vital Signs Last 24 Hrs  T(C): 36.6 (03 Jun 2024 08:00), Max: 37.2 (02 Jun 2024 20:00)  T(F): 97.9 (03 Jun 2024 08:00), Max: 98.9 (02 Jun 2024 20:00)  HR: 91 (03 Jun 2024 07:00) (63 - 115)  BP: 106/56 (03 Jun 2024 07:00) (82/49 - 119/60)  BP(mean): 75 (03 Jun 2024 07:00) (61 - 84)  ABP: --  ABP(mean): --  RR: 22 (03 Jun 2024 07:00) (15 - 26)  SpO2: 100% (03 Jun 2024 07:00) (95% - 100%)    O2 Parameters below as of 03 Jun 2024 08:00  Patient On (Oxygen Delivery Method): mask, Venturi    O2 Concentration (%): 50        General: awake   HEENT:     luis a           Lymph Nodes: NO cervical LN   Lungs: Bilateral BS  Cardiovascular: Regular   Abdomen: Soft, Positive BS  Extremities: No clubbing   Skin: warm   Neurological: at baseline   Musculoskeletal: move all ext     I&O's Detail    02 Jun 2024 07:01  -  03 Jun 2024 07:00  --------------------------------------------------------  IN:    Enteral Tube Flush: 300 mL    Free Water: 450 mL    IV PiggyBack: 720 mL    Jevity 1.2: 615 mL    Lactated Ringers: 75 mL    Lactated Ringers Bolus: 500 mL    Norepinephrine: 129.1 mL  Total IN: 2789.1 mL    OUT:    Voided (mL): 2450 mL  Total OUT: 2450 mL    Total NET: 339.1 mL          LABS:                          8.2    10.29 )-----------( 407      ( 03 Jun 2024 05:05 )             26.0         03 Jun 2024 05:05    140    |  104    |  5      ----------------------------<  161    4.0     |  30     |  <0.5     Ca    7.8        03 Jun 2024 05:05  Phos  2.7       03 Jun 2024 05:05  Mg     2.2       03 Jun 2024 05:05    TPro  5.5    /  Alb  2.4    /  TBili  <0.2   /  DBili  x      /  AST  10     /  ALT  9      /  AlkPhos  82     03 Jun 2024 05:05  Amylase x     lipase x                                                                                        Urinalysis Basic - ( 03 Jun 2024 05:05 )    Color: x / Appearance: x / SG: x / pH: x  Gluc: 161 mg/dL / Ketone: x  / Bili: x / Urobili: x   Blood: x / Protein: x / Nitrite: x   Leuk Esterase: x / RBC: x / WBC x   Sq Epi: x / Non Sq Epi: x / Bacteria: x        Lactate, Blood: 1.9 mmol/L (06-02-24 @ 00:05)  Lactate, Blood: 1.4 mmol/L (05-31-24 @ 21:21)                                                          Urinalysis with Rflx Culture (collected 02 Jun 2024 00:05)    Culture - Blood (collected 31 May 2024 19:16)  Source: .Blood Blood-Peripheral  Preliminary Report (03 Jun 2024 01:02):    No growth at 48 Hours    Culture - Blood (collected 31 May 2024 19:16)  Source: .Blood Blood-Peripheral  Preliminary Report (03 Jun 2024 01:02):    No growth at 48 Hours                                                                                           MEDICATIONS  (STANDING):  albuterol/ipratropium for Nebulization 3 milliLiter(s) Nebulizer every 6 hours  gabapentin 300 milliGRAM(s) Oral every 12 hours  heparin   Injectable 5000 Unit(s) SubCutaneous every 12 hours  levETIRAcetam  Solution 750 milliGRAM(s) Oral two times a day  linezolid  IVPB      linezolid  IVPB 600 milliGRAM(s) IV Intermittent every 12 hours  meropenem  IVPB 2000 milliGRAM(s) IV Intermittent every 8 hours  midodrine 10 milliGRAM(s) Oral every 8 hours  mupirocin 2% Nasal 1 Application(s) Both Nostrils every 12 hours  norepinephrine Infusion 0.05 MICROgram(s)/kG/Min (5.16 mL/Hr) IV Continuous <Continuous>  pantoprazole   Suspension 40 milliGRAM(s) Oral daily  raloxifene 60 milliGRAM(s) Oral daily    MEDICATIONS  (PRN):  acetaminophen     Tablet .. 650 milliGRAM(s) Oral every 6 hours PRN Temp greater or equal to 38C (100.4F), Mild Pain (1 - 3)          Xrays:  TLC:  OG:  ET tube:                                                                                       ECHO:  [facility] ICU:

## 2024-06-03 NOTE — PROGRESS NOTE ADULT - ASSESSMENT
57 yo F with hx of down syndrome, anemia, cerebral palsy, seizure, osteoporosis, hypotension on midodrine, nonverbal at baseline who was BIBEMS from [facility] for respiratory distress. Per EMS, pt was satting 74% on 4L NC. Pt was admitted 4/16-5/29 for severe sepsis and respiratory failure 2/2 recurrent CAP. During admission, blood cx grew staph capitis and VRE which cleared after daptomycin and meropenem completed on 5/26. BRIANA was negative for endocarditis. Pt also had recurrent vomiting and aspiration despite PEG tube. Further hx limited as pt is nonverbal.     IMPRESSION:  Acute Hypoxemic Hypercapnic Respiratory Failure  Septic Shock   Pneumonia- possibly aspiration  Lactic Acidosis- Resolved  Normocytic Anemia  Hx of Down Syndrome  Hx of Cerebral Palsy/ Nonverbal at baseline  Hx of Seizures  Hx of OP      PLAN: see attestation note     CNS: keep off sedatives    HEENT: Oral Care    PULMONARY: HOB @ 45, aspiration precautions BIPAP as needed, try to wean off, Keep spo2 92 TO 96%. chest PT. Duonebs q6hrs and PRN.  NIV 4 on and 4 off at night and as needed   high flow if needed     CARDIOVASCULAR: Recent echo with normal EF, goal directed fluid resuscitation,    taper pressors for map 65   resume midodirne 10 mg Q8 hrs   GI: GI prophylaxis.  PEG feeding     RENAL: Strict In/out, keep euvolemic     INFECTIOUS DISEASE: Follow up blood culture, negative as of 6/3. Sputum culture pending collection. IV abx: Meropenem, zyvox . Urine strep pneumo & legionella. Full RVP panel negative. MRSA nares +.     HEMATOLOGICAL:  DVT prophylaxis.    ENDOCRINE:  Follow up FS.  Insulin protocol if needed.    MUSCULOSKELETAL: Bedrest    TLC on 5/31    Dispo: MICU, possibly [id number]    wound specialist     Obtain PIV and dc central line

## 2024-06-03 NOTE — PROGRESS NOTE ADULT - ASSESSMENT
57 yo F with hx of down syndrome, anemia, cerebral palsy, seizure, osteoporosis, hypotension on midodrine, nonverbal at baseline who was BIBEMS from [facility] for respiratory distress. Per EMS, pt was satting 74% on 4L NC. Pt was admitted 4/16-5/29 for severe sepsis and respiratory failure 2/2 recurrent CAP. During admission, blood cx grew staph capitis and VRE which cleared after daptomycin and meropenem completed on 5/26. BRIANA was negative for endocarditis. Pt also had recurrent vomiting and aspiration despite PEG tube. Further hx limited as pt is nonverbal.     IMPRESSION:  Acute Hypoxemic Hypercapnic Respiratory Failure  Septic Shock   Pneumonia- possibly aspiration  Lactic Acidosis- Resolved  Normocytic Anemia  Hx of Down Syndrome  Hx of Cerebral Palsy/ Nonverbal at baseline  Hx of Seizures  Hx of OP      PLAN: see attestation note     CNS: keep off sedatives    HEENT: Oral Care    PULMONARY: HOB @ 45, aspiration precautions BIPAP as needed, try to wean off, Keep spo2 92 TO 96%. chest PT. Duonebs q6hrs and PRN.  NIV 4 on and 4 off at night and as needed   high flow if needed     CARDIOVASCULAR: Recent echo with normal EF, goal directed fluid resuscitation,    taper pressors for map 65   resume midodirne 10 mg Q8 hrs   GI: GI prophylaxis.  PEG feeding     RENAL: Strict In/out, keep euvolemic     INFECTIOUS DISEASE: Follow up blood culture. Sputum culture. IV abx: Meropenem, zyvox . Urine strep pneumo & legionella. Full RVP panel. MRSA nares. ID evaluation    HEMATOLOGICAL:  DVT prophylaxis.    ENDOCRINE:  Follow up FS.  Insulin protocol if needed.    MUSCULOSKELETAL: Bedrest    TLC on 5/31    Dispo: MICU        59 yo F with hx of down syndrome, anemia, cerebral palsy, seizure, osteoporosis, hypotension on midodrine, nonverbal at baseline who was BIBEMS from [facility] for respiratory distress. Per EMS, pt was satting 74% on 4L NC. Pt was admitted 4/16-5/29 for severe sepsis and respiratory failure 2/2 recurrent CAP. During admission, blood cx grew staph capitis and VRE which cleared after daptomycin and meropenem completed on 5/26. BRIANA was negative for endocarditis. Pt also had recurrent vomiting and aspiration despite PEG tube. Further hx limited as pt is nonverbal.     IMPRESSION:  Acute Hypoxemic Hypercapnic Respiratory Failure  Septic Shock   Pneumonia- possibly aspiration  Lactic Acidosis- Resolved  Normocytic Anemia  Hx of Down Syndrome  Hx of Cerebral Palsy/ Nonverbal at baseline  Hx of Seizures  Hx of OP      PLAN: see attestation note     CNS: keep off sedatives    HEENT: Oral Care    PULMONARY: HOB @ 45, aspiration precautions BIPAP as needed, try to wean off, Keep spo2 92 TO 96%. chest PT. Duonebs q6hrs and PRN.  NIV 4 on and 4 off at night and as needed   high flow if needed     CARDIOVASCULAR: Recent echo with normal EF, goal directed fluid resuscitation,    taper pressors for map 65   resume midodirne 10 mg Q8 hrs   GI: GI prophylaxis.  PEG feeding     RENAL: Strict In/out, keep euvolemic     INFECTIOUS DISEASE: Follow up blood culture. Sputum culture. IV abx: Meropenem, zyvox . Urine strep pneumo & legionella. Full RVP panel. MRSA nares. ID evaluation    HEMATOLOGICAL:  DVT prophylaxis.    ENDOCRINE:  Follow up FS.  Insulin protocol if needed.    MUSCULOSKELETAL: Bedrest    TLC on 5/31    Dispo: MICU     wound specialist

## 2024-06-04 DIAGNOSIS — D64.9 ANEMIA, UNSPECIFIED: ICD-10-CM

## 2024-06-04 DIAGNOSIS — Z79.899 OTHER LONG TERM (CURRENT) DRUG THERAPY: ICD-10-CM

## 2024-06-04 DIAGNOSIS — Z71.3 DIETARY COUNSELING AND SURVEILLANCE: ICD-10-CM

## 2024-06-04 DIAGNOSIS — J96.01 ACUTE RESPIRATORY FAILURE WITH HYPOXIA: ICD-10-CM

## 2024-06-04 DIAGNOSIS — E03.9 HYPOTHYROIDISM, UNSPECIFIED: ICD-10-CM

## 2024-06-04 DIAGNOSIS — Z71.89 OTHER SPECIFIED COUNSELING: ICD-10-CM

## 2024-06-04 DIAGNOSIS — E83.42 HYPOMAGNESEMIA: ICD-10-CM

## 2024-06-04 DIAGNOSIS — G40.909 EPILEPSY, UNSPECIFIED, NOT INTRACTABLE, WITHOUT STATUS EPILEPTICUS: ICD-10-CM

## 2024-06-04 DIAGNOSIS — Z89.411 ACQUIRED ABSENCE OF RIGHT GREAT TOE: ICD-10-CM

## 2024-06-04 DIAGNOSIS — J69.0 PNEUMONITIS DUE TO INHALATION OF FOOD AND VOMIT: ICD-10-CM

## 2024-06-04 DIAGNOSIS — R65.21 SEVERE SEPSIS WITH SEPTIC SHOCK: ICD-10-CM

## 2024-06-04 DIAGNOSIS — Z51.5 ENCOUNTER FOR PALLIATIVE CARE: ICD-10-CM

## 2024-06-04 DIAGNOSIS — G62.9 POLYNEUROPATHY, UNSPECIFIED: ICD-10-CM

## 2024-06-04 DIAGNOSIS — M81.0 AGE-RELATED OSTEOPOROSIS WITHOUT CURRENT PATHOLOGICAL FRACTURE: ICD-10-CM

## 2024-06-04 DIAGNOSIS — Z86.718 PERSONAL HISTORY OF OTHER VENOUS THROMBOSIS AND EMBOLISM: ICD-10-CM

## 2024-06-04 DIAGNOSIS — Z89.421 ACQUIRED ABSENCE OF OTHER RIGHT TOE(S): ICD-10-CM

## 2024-06-04 DIAGNOSIS — L25.9 UNSPECIFIED CONTACT DERMATITIS, UNSPECIFIED CAUSE: ICD-10-CM

## 2024-06-04 DIAGNOSIS — L89.610 PRESSURE ULCER OF RIGHT HEEL, UNSTAGEABLE: ICD-10-CM

## 2024-06-04 DIAGNOSIS — Z79.01 LONG TERM (CURRENT) USE OF ANTICOAGULANTS: ICD-10-CM

## 2024-06-04 DIAGNOSIS — Z16.21 RESISTANCE TO VANCOMYCIN: ICD-10-CM

## 2024-06-04 DIAGNOSIS — Z11.52 ENCOUNTER FOR SCREENING FOR COVID-19: ICD-10-CM

## 2024-06-04 DIAGNOSIS — Z74.01 BED CONFINEMENT STATUS: ICD-10-CM

## 2024-06-04 DIAGNOSIS — A41.9 SEPSIS, UNSPECIFIED ORGANISM: ICD-10-CM

## 2024-06-04 DIAGNOSIS — Z93.1 GASTROSTOMY STATUS: ICD-10-CM

## 2024-06-04 DIAGNOSIS — D84.9 IMMUNODEFICIENCY, UNSPECIFIED: ICD-10-CM

## 2024-06-04 DIAGNOSIS — G93.41 METABOLIC ENCEPHALOPATHY: ICD-10-CM

## 2024-06-04 DIAGNOSIS — Z86.19 PERSONAL HISTORY OF OTHER INFECTIOUS AND PARASITIC DISEASES: ICD-10-CM

## 2024-06-04 DIAGNOSIS — E87.29 OTHER ACIDOSIS: ICD-10-CM

## 2024-06-04 DIAGNOSIS — G80.9 CEREBRAL PALSY, UNSPECIFIED: ICD-10-CM

## 2024-06-04 DIAGNOSIS — R11.10 VOMITING, UNSPECIFIED: ICD-10-CM

## 2024-06-04 DIAGNOSIS — Q90.9 DOWN SYNDROME, UNSPECIFIED: ICD-10-CM

## 2024-06-04 DIAGNOSIS — Z66 DO NOT RESUSCITATE: ICD-10-CM

## 2024-06-04 LAB
ALBUMIN SERPL ELPH-MCNC: 2.7 G/DL — LOW (ref 3.5–5.2)
ALP SERPL-CCNC: 74 U/L — SIGNIFICANT CHANGE UP (ref 30–115)
ALT FLD-CCNC: 8 U/L — SIGNIFICANT CHANGE UP (ref 0–41)
ANION GAP SERPL CALC-SCNC: 8 MMOL/L — SIGNIFICANT CHANGE UP (ref 7–14)
AST SERPL-CCNC: 10 U/L — SIGNIFICANT CHANGE UP (ref 0–41)
BASOPHILS # BLD AUTO: 0.07 K/UL — SIGNIFICANT CHANGE UP (ref 0–0.2)
BASOPHILS NFR BLD AUTO: 0.7 % — SIGNIFICANT CHANGE UP (ref 0–1)
BILIRUB SERPL-MCNC: <0.2 MG/DL — SIGNIFICANT CHANGE UP (ref 0.2–1.2)
BUN SERPL-MCNC: 5 MG/DL — LOW (ref 10–20)
CALCIUM SERPL-MCNC: 8.2 MG/DL — LOW (ref 8.4–10.5)
CHLORIDE SERPL-SCNC: 99 MMOL/L — SIGNIFICANT CHANGE UP (ref 98–110)
CO2 SERPL-SCNC: 30 MMOL/L — SIGNIFICANT CHANGE UP (ref 17–32)
CREAT SERPL-MCNC: <0.5 MG/DL — LOW (ref 0.7–1.5)
EGFR: 115 ML/MIN/1.73M2 — SIGNIFICANT CHANGE UP
EOSINOPHIL # BLD AUTO: 0.6 K/UL — SIGNIFICANT CHANGE UP (ref 0–0.7)
EOSINOPHIL NFR BLD AUTO: 5.8 % — SIGNIFICANT CHANGE UP (ref 0–8)
GLUCOSE BLDC GLUCOMTR-MCNC: 138 MG/DL — HIGH (ref 70–99)
GLUCOSE BLDC GLUCOMTR-MCNC: 99 MG/DL — SIGNIFICANT CHANGE UP (ref 70–99)
GLUCOSE SERPL-MCNC: 122 MG/DL — HIGH (ref 70–99)
HCT VFR BLD CALC: 27.7 % — LOW (ref 37–47)
HGB BLD-MCNC: 8.6 G/DL — LOW (ref 12–16)
IMM GRANULOCYTES NFR BLD AUTO: 0.4 % — HIGH (ref 0.1–0.3)
LYMPHOCYTES # BLD AUTO: 1.75 K/UL — SIGNIFICANT CHANGE UP (ref 1.2–3.4)
LYMPHOCYTES # BLD AUTO: 16.8 % — LOW (ref 20.5–51.1)
MAGNESIUM SERPL-MCNC: 2.9 MG/DL — HIGH (ref 1.8–2.4)
MCHC RBC-ENTMCNC: 28.9 PG — SIGNIFICANT CHANGE UP (ref 27–31)
MCHC RBC-ENTMCNC: 31 G/DL — LOW (ref 32–37)
MCV RBC AUTO: 93 FL — SIGNIFICANT CHANGE UP (ref 81–99)
MONOCYTES # BLD AUTO: 0.45 K/UL — SIGNIFICANT CHANGE UP (ref 0.1–0.6)
MONOCYTES NFR BLD AUTO: 4.3 % — SIGNIFICANT CHANGE UP (ref 1.7–9.3)
NEUTROPHILS # BLD AUTO: 7.48 K/UL — HIGH (ref 1.4–6.5)
NEUTROPHILS NFR BLD AUTO: 72 % — SIGNIFICANT CHANGE UP (ref 42.2–75.2)
NRBC # BLD: 0 /100 WBCS — SIGNIFICANT CHANGE UP (ref 0–0)
PHOSPHATE SERPL-MCNC: 2.9 MG/DL — SIGNIFICANT CHANGE UP (ref 2.1–4.9)
PLATELET # BLD AUTO: 429 K/UL — HIGH (ref 130–400)
PMV BLD: 9.6 FL — SIGNIFICANT CHANGE UP (ref 7.4–10.4)
POTASSIUM SERPL-MCNC: 4.1 MMOL/L — SIGNIFICANT CHANGE UP (ref 3.5–5)
POTASSIUM SERPL-SCNC: 4.1 MMOL/L — SIGNIFICANT CHANGE UP (ref 3.5–5)
PROT SERPL-MCNC: 5.8 G/DL — LOW (ref 6–8)
RBC # BLD: 2.98 M/UL — LOW (ref 4.2–5.4)
RBC # FLD: 18.6 % — HIGH (ref 11.5–14.5)
SODIUM SERPL-SCNC: 137 MMOL/L — SIGNIFICANT CHANGE UP (ref 135–146)
WBC # BLD: 10.39 K/UL — SIGNIFICANT CHANGE UP (ref 4.8–10.8)
WBC # FLD AUTO: 10.39 K/UL — SIGNIFICANT CHANGE UP (ref 4.8–10.8)

## 2024-06-04 PROCEDURE — 99223 1ST HOSP IP/OBS HIGH 75: CPT

## 2024-06-04 PROCEDURE — 99233 SBSQ HOSP IP/OBS HIGH 50: CPT

## 2024-06-04 PROCEDURE — 71045 X-RAY EXAM CHEST 1 VIEW: CPT | Mod: 26

## 2024-06-04 PROCEDURE — 93010 ELECTROCARDIOGRAM REPORT: CPT

## 2024-06-04 RX ORDER — SODIUM CHLORIDE 9 MG/ML
250 INJECTION, SOLUTION INTRAVENOUS ONCE
Refills: 0 | Status: COMPLETED | OUTPATIENT
Start: 2024-06-04 | End: 2024-06-04

## 2024-06-04 RX ORDER — MAGNESIUM SULFATE 500 MG/ML
2 VIAL (ML) INJECTION ONCE
Refills: 0 | Status: COMPLETED | OUTPATIENT
Start: 2024-06-04 | End: 2024-06-04

## 2024-06-04 RX ORDER — MEROPENEM 1 G/30ML
1000 INJECTION INTRAVENOUS EVERY 8 HOURS
Refills: 0 | Status: COMPLETED | OUTPATIENT
Start: 2024-06-04 | End: 2024-06-11

## 2024-06-04 RX ADMIN — MEROPENEM 280 MILLIGRAM(S): 1 INJECTION INTRAVENOUS at 05:33

## 2024-06-04 RX ADMIN — Medication 25 GRAM(S): at 06:25

## 2024-06-04 RX ADMIN — MUPIROCIN 1 APPLICATION(S): 20 OINTMENT TOPICAL at 17:38

## 2024-06-04 RX ADMIN — SODIUM CHLORIDE 500 MILLILITER(S): 9 INJECTION, SOLUTION INTRAVENOUS at 11:20

## 2024-06-04 RX ADMIN — MEROPENEM 100 MILLIGRAM(S): 1 INJECTION INTRAVENOUS at 21:29

## 2024-06-04 RX ADMIN — HEPARIN SODIUM 5000 UNIT(S): 5000 INJECTION INTRAVENOUS; SUBCUTANEOUS at 17:35

## 2024-06-04 RX ADMIN — GABAPENTIN 300 MILLIGRAM(S): 400 CAPSULE ORAL at 17:36

## 2024-06-04 RX ADMIN — Medication 3 MILLILITER(S): at 07:53

## 2024-06-04 RX ADMIN — MIDODRINE HYDROCHLORIDE 10 MILLIGRAM(S): 2.5 TABLET ORAL at 05:33

## 2024-06-04 RX ADMIN — MUPIROCIN 1 APPLICATION(S): 20 OINTMENT TOPICAL at 05:34

## 2024-06-04 RX ADMIN — MIDODRINE HYDROCHLORIDE 10 MILLIGRAM(S): 2.5 TABLET ORAL at 21:29

## 2024-06-04 RX ADMIN — LINEZOLID 300 MILLIGRAM(S): 600 INJECTION, SOLUTION INTRAVENOUS at 17:35

## 2024-06-04 RX ADMIN — MIDODRINE HYDROCHLORIDE 10 MILLIGRAM(S): 2.5 TABLET ORAL at 13:54

## 2024-06-04 RX ADMIN — RALOXIFENE HYDROCHLORIDE 60 MILLIGRAM(S): 60 TABLET, COATED ORAL at 11:08

## 2024-06-04 RX ADMIN — NYSTATIN CREAM 1 APPLICATION(S): 100000 CREAM TOPICAL at 05:33

## 2024-06-04 RX ADMIN — GABAPENTIN 300 MILLIGRAM(S): 400 CAPSULE ORAL at 05:33

## 2024-06-04 RX ADMIN — Medication 3 MILLILITER(S): at 13:15

## 2024-06-04 RX ADMIN — HEPARIN SODIUM 5000 UNIT(S): 5000 INJECTION INTRAVENOUS; SUBCUTANEOUS at 05:33

## 2024-06-04 RX ADMIN — NYSTATIN CREAM 1 APPLICATION(S): 100000 CREAM TOPICAL at 17:38

## 2024-06-04 RX ADMIN — LEVETIRACETAM 750 MILLIGRAM(S): 250 TABLET, FILM COATED ORAL at 17:37

## 2024-06-04 RX ADMIN — LINEZOLID 300 MILLIGRAM(S): 600 INJECTION, SOLUTION INTRAVENOUS at 05:32

## 2024-06-04 RX ADMIN — PANTOPRAZOLE SODIUM 40 MILLIGRAM(S): 20 TABLET, DELAYED RELEASE ORAL at 11:08

## 2024-06-04 RX ADMIN — Medication 3 MILLILITER(S): at 19:26

## 2024-06-04 RX ADMIN — LEVETIRACETAM 750 MILLIGRAM(S): 250 TABLET, FILM COATED ORAL at 05:32

## 2024-06-04 RX ADMIN — MEROPENEM 100 MILLIGRAM(S): 1 INJECTION INTRAVENOUS at 13:54

## 2024-06-04 NOTE — PROGRESS NOTE ADULT - SUBJECTIVE AND OBJECTIVE BOX
Patient is a 58y old  Female who presents with a chief complaint of Hypoxia and Tachypnea (03 Jun 2024 17:47)      Over Night Events:  Patient seen and examined.   on levo 0.02     ROS:  See HPI    PHYSICAL EXAM    ICU Vital Signs Last 24 Hrs  T(C): 36.9 (04 Jun 2024 04:00), Max: 36.9 (03 Jun 2024 16:00)  T(F): 98.4 (04 Jun 2024 04:00), Max: 98.5 (03 Jun 2024 16:00)  HR: 86 (04 Jun 2024 06:00) (63 - 126)  BP: 110/62 (04 Jun 2024 06:00) (73/38 - 118/58)  BP(mean): 81 (04 Jun 2024 06:00) (50 - 89)  ABP: --  ABP(mean): --  RR: 16 (04 Jun 2024 06:00) (16 - 28)  SpO2: 100% (04 Jun 2024 06:00) (92% - 100%)    O2 Parameters below as of 04 Jun 2024 06:00  Patient On (Oxygen Delivery Method): nasal cannula  O2 Flow (L/min): 3          General: more awake   HEENT:  luis a              Lymph Nodes: NO cervical LN   Lungs: Bilateral BS  Cardiovascular: Regular   Abdomen: Soft, Positive BS  Extremities: No clubbing   Skin: warm   Neurological: no focal   Musculoskeletal: move all ext     I&O's Detail    03 Jun 2024 07:01  -  04 Jun 2024 07:00  --------------------------------------------------------  IN:    Enteral Tube Flush: 130 mL    Free Water: 600 mL    IV PiggyBack: 140 mL    IV PiggyBack: 930 mL    Jevity 1.2: 490 mL    Norepinephrine: 85.6 mL    Sodium Chloride 0.9% Bolus: 500 mL  Total IN: 2875.6 mL    OUT:    Voided (mL): 2800 mL  Total OUT: 2800 mL    Total NET: 75.6 mL          LABS:                          8.2    10.29 )-----------( 407      ( 03 Jun 2024 05:05 )             26.0         03 Jun 2024 05:05    140    |  104    |  5      ----------------------------<  161    4.0     |  30     |  <0.5     Ca    7.8        03 Jun 2024 05:05  Phos  2.7       03 Jun 2024 05:05  Mg     2.2       03 Jun 2024 05:05                                                                                      Urinalysis Basic - ( 03 Jun 2024 05:05 )    Color: x / Appearance: x / SG: x / pH: x  Gluc: 161 mg/dL / Ketone: x  / Bili: x / Urobili: x   Blood: x / Protein: x / Nitrite: x   Leuk Esterase: x / RBC: x / WBC x   Sq Epi: x / Non Sq Epi: x / Bacteria: x        Lactate, Blood: 1.9 mmol/L (06-02-24 @ 00:05)                                                          Culture - Urine (collected 02 Jun 2024 00:05)  Source: Catheterized None  Final Report (03 Jun 2024 09:27):    <10,000 CFU/mL Normal Urogenital Mili    Urinalysis with Rflx Culture (collected 02 Jun 2024 00:05)                                                                                           MEDICATIONS  (STANDING):  albuterol/ipratropium for Nebulization 3 milliLiter(s) Nebulizer every 6 hours  gabapentin 300 milliGRAM(s) Oral every 12 hours  heparin   Injectable 5000 Unit(s) SubCutaneous every 12 hours  levETIRAcetam  Solution 750 milliGRAM(s) Oral two times a day  linezolid  IVPB      linezolid  IVPB 600 milliGRAM(s) IV Intermittent every 12 hours  meropenem  IVPB 2000 milliGRAM(s) IV Intermittent every 8 hours  midodrine 10 milliGRAM(s) Oral every 8 hours  mupirocin 2% Nasal 1 Application(s) Both Nostrils every 12 hours  norepinephrine Infusion 0.05 MICROgram(s)/kG/Min (5.16 mL/Hr) IV Continuous <Continuous>  nystatin Cream 1 Application(s) Topical two times a day  pantoprazole   Suspension 40 milliGRAM(s) Oral daily  raloxifene 60 milliGRAM(s) Oral daily    MEDICATIONS  (PRN):  acetaminophen     Tablet .. 650 milliGRAM(s) Oral every 6 hours PRN Temp greater or equal to 38C (100.4F), Mild Pain (1 - 3)          Xrays:  TLC:  OG:  ET tube:                                                                                       ECHO:  CAM ICU:

## 2024-06-04 NOTE — PROGRESS NOTE ADULT - ASSESSMENT
· Assessment	  59 yo F with hx of down syndrome, anemia, cerebral palsy, seizure, osteoporosis, hypotension on midodrine, nonverbal at baseline who was BIBEMS from [facility] for respiratory distress. Per EMS, pt was satting 74% on 4L NC.     ID is consulted for pneumonia  Hx polymicrobial bacteremia with ESBL Proteus, VRE, and CoNS, treated with daptomycin and danna  Febrile to 104.2, WBC 15.59  Lactic acid 3.7 > 1.4  COVID/flu/RSV negative      IMPRESSION:  Suspected ORSA/GNR  pneumonia  Nares ORSA positive  COVID 19/ Influenza/ RSV NG.   5/31 BCx NG  6/2 UCx NG  No pyuria  WBC 10.2    Hx Seizure  Immunosuppression secondary to multiple comorbidities which could result in poor clinical outcome      RECOMMENDATIONS:  - Change iV meropenem 1g q8h  - IV linezolid 600mg q12hrs  - Offloading and frequent position changes, aspiration precaution  - Trend WBC, fever curve, transaminases, creatinine daily

## 2024-06-04 NOTE — DIETITIAN INITIAL EVALUATION ADULT - PERTINENT LABORATORY DATA
06-04    137  |  99  |  5<L>  ----------------------------<  122<H>  4.1   |  30  |  <0.5<L>    Ca    8.2<L>      04 Jun 2024 07:50  Phos  2.9     06-04  Mg     2.9     06-04    TPro  5.8<L>  /  Alb  2.7<L>  /  TBili  <0.2  /  DBili  x   /  AST  10  /  ALT  8   /  AlkPhos  74  06-04  POCT Blood Glucose.: 99 mg/dL (06-04-24 @ 17:45)  A1C with Estimated Average Glucose Result: 5.6 % (06-01-24 @ 04:17)  A1C with Estimated Average Glucose Result: 5.0 % (04-17-24 @ 06:47)

## 2024-06-04 NOTE — CHART NOTE - NSCHARTNOTEFT_GEN_A_CORE
MICU DOWN GRADE NOTE      CC: Patient is a 58y old  Female who presents with a chief complaint of Hypoxia and Tachypnea (04 Jun 2024 10:48)    Over Night Events:  6/4: Patient was brought down to 0.02 levo overnight, became hypotensive. Levo brought back up to 0.04 and patient became hemodynamically stable. In the AM during rounds, RN was able to bring levo to 0.02 again.    HPI:   57 yo F with hx of down syndrome, anemia, cerebral palsy, seizure, osteoporosis, hypotension on midodrine, nonverbal at baseline who was BIBEMS from [facility] on 5/31 for respiratory distress. Per EMS, pt was satting 74% on 4L NC. Pt was admitted 4/16-5/29 for severe sepsis and respiratory failure 2/2 recurrent CAP. During admission, blood cx grew staph capitis and VRE which cleared after daptomycin and meropenem completed on 5/26. BRIANA was negative for endocarditis. Pt also had recurrent vomiting and aspiration despite PEG tube. Further hx limited as pt is nonverbal.     GOC as follows:  Emelyn RIVAS decided on DNR/DNI per their documentation letter. (GOC done on 2/29/2024)    In the ED, patient was given dapto and danna as per previous cultures. Patient was given 3 L of IV fluids and then started on levophed. Labs showed WBC of 17 with left shift. Lactate 3.7->1.4. ABG shows mild respiratory acidosis. BIPAP applied and patient improved. CT abdomen was done. Central line was placed in the right IJ for fluid resuscitation and hemodynamic stability.    In CCU (6/1-6/4) patient was weaned off of bipap and kept off sedatives. Peg tube fed. RVP negative but was found to be MRSA positive. Blood culture remains negative. Per ID, continue IV meropenem 2g q8hrs for septic shock. Vancomycin that patient was initially one was discontinued, and switched over to starting IV linezolid 600mg q12hrs. As of 6/3, patient hemodynamically stable on levo at 0.06 and NIV at night and as needed. Patient was responsive to cheetah, got a total of 750ml fluids, was taken off pressors. Central line removed, PIV below ac in place for small doses of pressors if needed.    TO DO LIST:  -Follow up blood culture, negative as of 6/3  -Sputum culture pending collection  - Urine strep pneumo & legionella pending collection  -C/w IV abx: Meropenem, zyvox. F/u with ID    MEDICATIONS:  acetaminophen     Tablet .. 650 milliGRAM(s) Oral every 6 hours PRN  albuterol/ipratropium for Nebulization 3 milliLiter(s) Nebulizer every 6 hours  gabapentin 300 milliGRAM(s) Oral every 12 hours  heparin   Injectable 5000 Unit(s) SubCutaneous every 12 hours  lactated ringers Bolus 250 milliLiter(s) IV Bolus once  levETIRAcetam  Solution 750 milliGRAM(s) Oral two times a day  linezolid  IVPB      linezolid  IVPB 600 milliGRAM(s) IV Intermittent every 12 hours  meropenem  IVPB 2000 milliGRAM(s) IV Intermittent every 8 hours  midodrine 10 milliGRAM(s) Oral every 8 hours  mupirocin 2% Nasal 1 Application(s) Both Nostrils every 12 hours  norepinephrine Infusion 0.05 MICROgram(s)/kG/Min IV Continuous <Continuous>  nystatin Cream 1 Application(s) Topical two times a day  pantoprazole   Suspension 40 milliGRAM(s) Oral daily  raloxifene 60 milliGRAM(s) Oral daily      T(C): 36.7 (06-04-24 @ 08:00), Max: 36.9 (06-03-24 @ 16:00)  HR: 95 (06-04-24 @ 10:00) (65 - 126)  BP: 98/51 (06-04-24 @ 10:00) (77/50 - 117/70)  RR: 20 (06-04-24 @ 10:00) (16 - 28)  SpO2: 100% (06-04-24 @ 10:00) (95% - 100%)  Wt(kg): --Vital Signs Last 24 Hrs  T(C): 36.7 (04 Jun 2024 08:00), Max: 36.9 (03 Jun 2024 16:00)  T(F): 98.1 (04 Jun 2024 08:00), Max: 98.5 (03 Jun 2024 16:00)  HR: 95 (04 Jun 2024 10:00) (65 - 126)  BP: 98/51 (04 Jun 2024 10:00) (77/50 - 117/70)  BP(mean): 65 (04 Jun 2024 10:00) (59 - 89)  RR: 20 (04 Jun 2024 10:00) (16 - 28)  SpO2: 100% (04 Jun 2024 10:00) (95% - 100%)    Parameters below as of 04 Jun 2024 10:00  Patient On (Oxygen Delivery Method): nasal cannula  O2 Flow (L/min): 2      PHYSICAL EXAM:  General: more awake   HEENT: NCAT             Lungs: Bilateral BS  Cardiovascular: Regular  Abdomen: Soft  Extremities: No clubbing   Skin: warm   Neurological: no focal     Consultant(s) Notes Reviewed:  [x ] YES  [ ] NO  Care Discussed with Consultants/Other Providers [ x] YES  [ ] NO    LABS:                        8.6    10.39 )-----------( 429      ( 04 Jun 2024 07:50 )             27.7     06-04    137  |  99  |  5<L>  ----------------------------<  122<H>  4.1   |  30  |  <0.5<L>      IMPRESSION:  Acute Hypoxemic Hypercapnic Respiratory Failure  Septic Shock   Pneumonia- possibly aspiration  Lactic Acidosis- Resolved  Normocytic Anemia  Hx of Down Syndrome  Hx of Cerebral Palsy/ Nonverbal at baseline  Hx of Seizures  Hx of OP      PLAN: see attestation note     CNS: keep off sedatives    HEENT: Oral Care    PULMONARY: HOB @ 45, aspiration precautions BIPAP as needed, try to wean off, Keep spo2 92 TO 96%. chest PT. Duonebs q6hrs and PRN.  NIV 4 on and 4 off at night and as needed   high flow if needed     CARDIOVASCULAR: Recent echo with normal EF, goal directed fluid resuscitation,    taper pressors for map 65   midodirne 10 mg Q8 hrs     GI: GI prophylaxis.  PEG feeding     RENAL: Strict In/out, keep euvolemic     INFECTIOUS DISEASE: Follow up blood culture. Sputum culture. IV abx: Meropenem, zyvox . Urine strep pneumo & legionella pending collection. Full RVP panel negative. MRSA nares. ID evaluation    HEMATOLOGICAL:  DVT prophylaxis.    ENDOCRINE:  Follow up FS.  Insulin protocol if needed.    MUSCULOSKELETAL: Bedrest    TLC on 5/31      wound specialist   d/c tlc   downgrade to SDU    Ca    8.2<L>      04 Jun 2024 07:50  Phos  2.9     06-04  Mg     2.9     06-04    TPro  5.8<L>  /  Alb  2.7<L>  /  TBili  <0.2  /  DBili  x   /  AST  10  /  ALT  8   /  AlkPhos  74  06-04      Urinalysis Basic - ( 04 Jun 2024 07:50 )    Color: x / Appearance: x / SG: x / pH: x  Gluc: 122 mg/dL / Ketone: x  / Bili: x / Urobili: x   Blood: x / Protein: x / Nitrite: x   Leuk Esterase: x / RBC: x / WBC x   Sq Epi: x / Non Sq Epi: x / Bacteria: x      CAPILLARY BLOOD GLUCOSE      POCT Blood Glucose.: 138 mg/dL (04 Jun 2024 07:47)        Urinalysis Basic - ( 04 Jun 2024 07:50 )    Color: x / Appearance: x / SG: x / pH: x  Gluc: 122 mg/dL / Ketone: x  / Bili: x / Urobili: x   Blood: x / Protein: x / Nitrite: x   Leuk Esterase: x / RBC: x / WBC x   Sq Epi: x / Non Sq Epi: x / Bacteria: x        RADIOLOGY & ADDITIONAL TESTS:    Imaging Personally Reviewed:  [x ] YES  [ ] NO MICU DOWN GRADE NOTE      CC: Patient is a 58y old  Female who presents with a chief complaint of Hypoxia and Tachypnea (04 Jun 2024 10:48)    Over Night Events:  6/4: Patient was brought down to 0.02 levo overnight, became hypotensive. Levo brought back up to 0.04 and patient became hemodynamically stable. In the AM during rounds, RN was able to bring levo to 0.02 again.    HPI:   57 yo F with hx of down syndrome, anemia, cerebral palsy, seizure, osteoporosis, hypotension on midodrine, nonverbal at baseline who was BIBEMS from [facility] on 5/31 for respiratory distress. Per EMS, pt was satting 74% on 4L NC. Pt was admitted 4/16-5/29 for severe sepsis and respiratory failure 2/2 recurrent CAP. During admission, blood cx grew staph capitis and VRE which cleared after daptomycin and meropenem completed on 5/26. BRIANA was negative for endocarditis. Pt also had recurrent vomiting and aspiration despite PEG tube. Further hx limited as pt is nonverbal.     GOC as follows:  Emelyn RIVAS decided on DNR/DNI per their documentation letter. (GOC done on 2/29/2024)    In the ED, patient was given dapto and danna as per previous cultures. Patient was given 3 L of IV fluids and then started on levophed. Labs showed WBC of 17 with left shift. Lactate 3.7->1.4. ABG shows mild respiratory acidosis. BIPAP applied and patient improved. CT abdomen was done. Central line was placed in the right IJ for fluid resuscitation and hemodynamic stability.    In CCU (6/1-6/4) patient was weaned off of bipap and kept off sedatives. Peg tube fed. RVP negative but was found to be MRSA positive. Blood culture remains negative. Per ID, continue IV meropenem 2g q8hrs for septic shock. Vancomycin that patient was initially one was discontinued, and switched over to starting IV linezolid 600mg q12hrs. As of 6/3, patient hemodynamically stable on levo at 0.06 and NIV at night and as needed. Patient was responsive to cheetah, got a total of 750ml fluids, was taken off pressors. BP of 129/58 with MAP of 75 at 1pm. Central line removed, PIV below ac in place for small doses of pressors if needed.    TO DO LIST:  -Follow up blood culture, negative as of 6/3  -Sputum culture pending collection  - Urine strep pneumo & legionella pending collection  -C/w IV abx: Meropenem, zyvox. F/u with ID    MEDICATIONS:  acetaminophen     Tablet .. 650 milliGRAM(s) Oral every 6 hours PRN  albuterol/ipratropium for Nebulization 3 milliLiter(s) Nebulizer every 6 hours  gabapentin 300 milliGRAM(s) Oral every 12 hours  heparin   Injectable 5000 Unit(s) SubCutaneous every 12 hours  lactated ringers Bolus 250 milliLiter(s) IV Bolus once  levETIRAcetam  Solution 750 milliGRAM(s) Oral two times a day  linezolid  IVPB      linezolid  IVPB 600 milliGRAM(s) IV Intermittent every 12 hours  meropenem  IVPB 2000 milliGRAM(s) IV Intermittent every 8 hours  midodrine 10 milliGRAM(s) Oral every 8 hours  mupirocin 2% Nasal 1 Application(s) Both Nostrils every 12 hours  norepinephrine Infusion 0.05 MICROgram(s)/kG/Min IV Continuous <Continuous>  nystatin Cream 1 Application(s) Topical two times a day  pantoprazole   Suspension 40 milliGRAM(s) Oral daily  raloxifene 60 milliGRAM(s) Oral daily      T(C): 36.7 (06-04-24 @ 08:00), Max: 36.9 (06-03-24 @ 16:00)  HR: 95 (06-04-24 @ 10:00) (65 - 126)  BP: 98/51 (06-04-24 @ 10:00) (77/50 - 117/70)  RR: 20 (06-04-24 @ 10:00) (16 - 28)  SpO2: 100% (06-04-24 @ 10:00) (95% - 100%)  Wt(kg): --Vital Signs Last 24 Hrs  T(C): 36.7 (04 Jun 2024 08:00), Max: 36.9 (03 Jun 2024 16:00)  T(F): 98.1 (04 Jun 2024 08:00), Max: 98.5 (03 Jun 2024 16:00)  HR: 95 (04 Jun 2024 10:00) (65 - 126)  BP: 98/51 (04 Jun 2024 10:00) (77/50 - 117/70)  BP(mean): 65 (04 Jun 2024 10:00) (59 - 89)  RR: 20 (04 Jun 2024 10:00) (16 - 28)  SpO2: 100% (04 Jun 2024 10:00) (95% - 100%)    Parameters below as of 04 Jun 2024 10:00  Patient On (Oxygen Delivery Method): nasal cannula  O2 Flow (L/min): 2      PHYSICAL EXAM:  General: more awake   HEENT: NCAT             Lungs: Bilateral BS  Cardiovascular: Regular  Abdomen: Soft  Extremities: No clubbing   Skin: warm   Neurological: no focal     Consultant(s) Notes Reviewed:  [x ] YES  [ ] NO  Care Discussed with Consultants/Other Providers [ x] YES  [ ] NO    LABS:                        8.6    10.39 )-----------( 429      ( 04 Jun 2024 07:50 )             27.7     06-04    137  |  99  |  5<L>  ----------------------------<  122<H>  4.1   |  30  |  <0.5<L>      IMPRESSION:  Acute Hypoxemic Hypercapnic Respiratory Failure  Septic Shock   Pneumonia- possibly aspiration  Lactic Acidosis- Resolved  Normocytic Anemia  Hx of Down Syndrome  Hx of Cerebral Palsy/ Nonverbal at baseline  Hx of Seizures  Hx of OP      PLAN: see attestation note     CNS: keep off sedatives    HEENT: Oral Care    PULMONARY: HOB @ 45, aspiration precautions BIPAP as needed, try to wean off, Keep spo2 92 TO 96%. chest PT. Duonebs q6hrs and PRN.  NIV 4 on and 4 off at night and as needed   high flow if needed     CARDIOVASCULAR: Recent echo with normal EF, goal directed fluid resuscitation,    taper pressors for map 65   midodirne 10 mg Q8 hrs     GI: GI prophylaxis.  PEG feeding     RENAL: Strict In/out, keep euvolemic     INFECTIOUS DISEASE: Follow up blood culture. Sputum culture. IV abx: Meropenem, zyvox . Urine strep pneumo & legionella pending collection. Full RVP panel negative. MRSA nares. ID evaluation    HEMATOLOGICAL:  DVT prophylaxis.    ENDOCRINE:  Follow up FS.  Insulin protocol if needed.    MUSCULOSKELETAL: Bedrest    TLC on 5/31      wound specialist   d/c tlc   downgrade to SDU    Ca    8.2<L>      04 Jun 2024 07:50  Phos  2.9     06-04  Mg     2.9     06-04    TPro  5.8<L>  /  Alb  2.7<L>  /  TBili  <0.2  /  DBili  x   /  AST  10  /  ALT  8   /  AlkPhos  74  06-04      Urinalysis Basic - ( 04 Jun 2024 07:50 )    Color: x / Appearance: x / SG: x / pH: x  Gluc: 122 mg/dL / Ketone: x  / Bili: x / Urobili: x   Blood: x / Protein: x / Nitrite: x   Leuk Esterase: x / RBC: x / WBC x   Sq Epi: x / Non Sq Epi: x / Bacteria: x      CAPILLARY BLOOD GLUCOSE      POCT Blood Glucose.: 138 mg/dL (04 Jun 2024 07:47)        Urinalysis Basic - ( 04 Jun 2024 07:50 )    Color: x / Appearance: x / SG: x / pH: x  Gluc: 122 mg/dL / Ketone: x  / Bili: x / Urobili: x   Blood: x / Protein: x / Nitrite: x   Leuk Esterase: x / RBC: x / WBC x   Sq Epi: x / Non Sq Epi: x / Bacteria: x        RADIOLOGY & ADDITIONAL TESTS:    Imaging Personally Reviewed:  [x ] YES  [ ] NO MICU DOWN GRADE NOTE      CC: Patient is a 58y old  Female who presents with a chief complaint of Hypoxia and Tachypnea (04 Jun 2024 10:48)    Over Night Events:  6/4: Patient was brought down to 0.02 levo overnight, became hypotensive. Levo brought back up to 0.04 and patient became hemodynamically stable. In the AM during rounds, RN was able to bring levo to 0.02 again.    HPI:   59 yo F with hx of down syndrome, anemia, cerebral palsy, seizure, osteoporosis, hypotension on midodrine, nonverbal at baseline who was BIBEMS from [facility] on 5/31 for respiratory distress. Per EMS, pt was satting 74% on 4L NC. Pt was admitted 4/16-5/29 for severe sepsis and respiratory failure 2/2 recurrent CAP. During admission, blood cx grew staph capitis and VRE which cleared after daptomycin and meropenem completed on 5/26. BRIANA was negative for endocarditis. Pt also had recurrent vomiting and aspiration despite PEG tube. Further hx limited as pt is nonverbal.     GOC as follows:  Emelyn RIVAS decided on DNR/DNI per their documentation letter. (GOC done on 2/29/2024)    In the ED, patient was given dapto and danna as per previous cultures. Patient was given 3 L of IV fluids and then started on levophed. Labs showed WBC of 17 with left shift. Lactate 3.7->1.4. ABG shows mild respiratory acidosis. BIPAP applied and patient improved. CT abdomen was done. Central line was placed in the right IJ for fluid resuscitation and hemodynamic stability.    In CCU (6/1-6/4) patient was weaned off of bipap and kept off sedatives. Peg tube fed. RVP negative but was found to be MRSA positive. Blood culture remains negative. Per ID, continue IV meropenem 2g q8hrs for septic shock. Vancomycin that patient was initially one was discontinued, and switched over to starting IV linezolid 600mg q12hrs. As of 6/3, patient hemodynamically stable on levo at 0.06 and NIV at night and as needed. Patient was responsive to cheetah, got a total of 750ml fluids, was taken off pressors. BP of 129/58 with MAP of 75 at 1pm. Central line removed, PIV below ac in place for small doses of pressors if needed.    TO DO LIST:  -Follow up blood culture, negative as of 6/3  -Sputum culture pending collection  - Urine strep pneumo & legionella pending collection  -C/w IV abx: Meropenem, zyvox. F/u with ID  - f/u Palliative for GOC    MEDICATIONS:  acetaminophen     Tablet .. 650 milliGRAM(s) Oral every 6 hours PRN  albuterol/ipratropium for Nebulization 3 milliLiter(s) Nebulizer every 6 hours  gabapentin 300 milliGRAM(s) Oral every 12 hours  heparin   Injectable 5000 Unit(s) SubCutaneous every 12 hours  lactated ringers Bolus 250 milliLiter(s) IV Bolus once  levETIRAcetam  Solution 750 milliGRAM(s) Oral two times a day  linezolid  IVPB      linezolid  IVPB 600 milliGRAM(s) IV Intermittent every 12 hours  meropenem  IVPB 2000 milliGRAM(s) IV Intermittent every 8 hours  midodrine 10 milliGRAM(s) Oral every 8 hours  mupirocin 2% Nasal 1 Application(s) Both Nostrils every 12 hours  norepinephrine Infusion 0.05 MICROgram(s)/kG/Min IV Continuous <Continuous>  nystatin Cream 1 Application(s) Topical two times a day  pantoprazole   Suspension 40 milliGRAM(s) Oral daily  raloxifene 60 milliGRAM(s) Oral daily      T(C): 36.7 (06-04-24 @ 08:00), Max: 36.9 (06-03-24 @ 16:00)  HR: 95 (06-04-24 @ 10:00) (65 - 126)  BP: 98/51 (06-04-24 @ 10:00) (77/50 - 117/70)  RR: 20 (06-04-24 @ 10:00) (16 - 28)  SpO2: 100% (06-04-24 @ 10:00) (95% - 100%)  Wt(kg): --Vital Signs Last 24 Hrs  T(C): 36.7 (04 Jun 2024 08:00), Max: 36.9 (03 Jun 2024 16:00)  T(F): 98.1 (04 Jun 2024 08:00), Max: 98.5 (03 Jun 2024 16:00)  HR: 95 (04 Jun 2024 10:00) (65 - 126)  BP: 98/51 (04 Jun 2024 10:00) (77/50 - 117/70)  BP(mean): 65 (04 Jun 2024 10:00) (59 - 89)  RR: 20 (04 Jun 2024 10:00) (16 - 28)  SpO2: 100% (04 Jun 2024 10:00) (95% - 100%)    Parameters below as of 04 Jun 2024 10:00  Patient On (Oxygen Delivery Method): nasal cannula  O2 Flow (L/min): 2      PHYSICAL EXAM:  General: more awake   HEENT: NCAT             Lungs: Bilateral BS  Cardiovascular: Regular  Abdomen: Soft  Extremities: No clubbing   Skin: warm   Neurological: no focal     Consultant(s) Notes Reviewed:  [x ] YES  [ ] NO  Care Discussed with Consultants/Other Providers [ x] YES  [ ] NO    LABS:                        8.6    10.39 )-----------( 429      ( 04 Jun 2024 07:50 )             27.7     06-04    137  |  99  |  5<L>  ----------------------------<  122<H>  4.1   |  30  |  <0.5<L>      IMPRESSION:  Acute Hypoxemic Hypercapnic Respiratory Failure  Septic Shock   Pneumonia- possibly aspiration  Lactic Acidosis- Resolved  Normocytic Anemia  Hx of Down Syndrome  Hx of Cerebral Palsy/ Nonverbal at baseline  Hx of Seizures  Hx of OP      PLAN: see attestation note     CNS: keep off sedatives    HEENT: Oral Care    PULMONARY: HOB @ 45, aspiration precautions BIPAP as needed, try to wean off, Keep spo2 92 TO 96%. chest PT. Duonebs q6hrs and PRN.  NIV 4 on and 4 off at night and as needed   high flow if needed     CARDIOVASCULAR: Recent echo with normal EF, goal directed fluid resuscitation,    taper pressors for map 65   midodirne 10 mg Q8 hrs     GI: GI prophylaxis.  PEG feeding     RENAL: Strict In/out, keep euvolemic     INFECTIOUS DISEASE: Follow up blood culture. Sputum culture. IV abx: Meropenem, zyvox . Urine strep pneumo & legionella pending collection. Full RVP panel negative. MRSA nares. ID evaluation    HEMATOLOGICAL:  DVT prophylaxis.    ENDOCRINE:  Follow up FS.  Insulin protocol if needed.    MUSCULOSKELETAL: Bedrest    TLC on 5/31      wound specialist   d/c tlc   downgrade to SDU    Ca    8.2<L>      04 Jun 2024 07:50  Phos  2.9     06-04  Mg     2.9     06-04    TPro  5.8<L>  /  Alb  2.7<L>  /  TBili  <0.2  /  DBili  x   /  AST  10  /  ALT  8   /  AlkPhos  74  06-04      Urinalysis Basic - ( 04 Jun 2024 07:50 )    Color: x / Appearance: x / SG: x / pH: x  Gluc: 122 mg/dL / Ketone: x  / Bili: x / Urobili: x   Blood: x / Protein: x / Nitrite: x   Leuk Esterase: x / RBC: x / WBC x   Sq Epi: x / Non Sq Epi: x / Bacteria: x      CAPILLARY BLOOD GLUCOSE      POCT Blood Glucose.: 138 mg/dL (04 Jun 2024 07:47)        Urinalysis Basic - ( 04 Jun 2024 07:50 )    Color: x / Appearance: x / SG: x / pH: x  Gluc: 122 mg/dL / Ketone: x  / Bili: x / Urobili: x   Blood: x / Protein: x / Nitrite: x   Leuk Esterase: x / RBC: x / WBC x   Sq Epi: x / Non Sq Epi: x / Bacteria: x        RADIOLOGY & ADDITIONAL TESTS:    Imaging Personally Reviewed:  [x ] YES  [ ] NO MICU DOWN GRADE NOTE      CC: Patient is a 58y old  Female who presents with a chief complaint of Hypoxia and Tachypnea (04 Jun 2024 10:48)    Over Night Events:  6/4: Patient was brought down to 0.02 levo overnight, became hypotensive. Levo brought back up to 0.04 and patient became hemodynamically stable. In the AM during rounds, RN was able to bring levo to 0.02 again.    HPI:   57 yo F with hx of down syndrome, anemia, cerebral palsy, seizure, osteoporosis, hypotension on midodrine, nonverbal at baseline who was BIBEMS from [facility] on 5/31 for respiratory distress. Per EMS, pt was satting 74% on 4L NC. Pt was admitted 4/16-5/29 for severe sepsis and respiratory failure 2/2 recurrent CAP. During admission, blood cx grew staph capitis and VRE which cleared after daptomycin and meropenem completed on 5/26. BRIANA was negative for endocarditis. Pt also had recurrent vomiting and aspiration despite PEG tube. Further hx limited as pt is nonverbal.     GOC as follows:  Emelyn RIVAS decided on DNR/DNI per their documentation letter. (GOC done on 2/29/2024)    In the ED, patient was given dapto and danna as per previous cultures. Patient was given 3 L of IV fluids and then started on levophed. Labs showed WBC of 17 with left shift. Lactate 3.7->1.4. ABG shows mild respiratory acidosis. BIPAP applied and patient improved. CT abdomen was done. Central line was placed in the right IJ for fluid resuscitation and hemodynamic stability.    In CCU (6/1-6/4) patient was weaned off of bipap and kept off sedatives. Peg tube fed. RVP negative but was found to be MRSA positive. Blood culture remains negative. Per ID, continue IV meropenem 2g q8hrs for septic shock. Vancomycin that patient was initially one was discontinued, and switched over to starting IV linezolid 600mg q12hrs. La Nena Hatch, qssistant director of consumer advisory board, was asking for palliative re-eval to determine GOC for patient. Last [facility] April 2024 [facility] can be reached at [phone]. As of 6/3, patient hemodynamically stable on levo at 0.06 and NIV at night and as needed. Patient was responsive to cheetah, got a total of 750ml fluids, was taken off pressors. BP of 129/58 with MAP of 75 at 1pm. Central line removed, PIV below ac in place for small doses of pressors if needed.    TO DO LIST:  -Follow up blood culture, negative as of 6/3  -Sputum culture pending collection  - Urine strep pneumo & legionella pending collection  -C/w IV abx: Meropenem, zyvox. F/u with ID  - f/u Palliative for GOC    MEDICATIONS:  acetaminophen     Tablet .. 650 milliGRAM(s) Oral every 6 hours PRN  albuterol/ipratropium for Nebulization 3 milliLiter(s) Nebulizer every 6 hours  gabapentin 300 milliGRAM(s) Oral every 12 hours  heparin   Injectable 5000 Unit(s) SubCutaneous every 12 hours  lactated ringers Bolus 250 milliLiter(s) IV Bolus once  levETIRAcetam  Solution 750 milliGRAM(s) Oral two times a day  linezolid  IVPB      linezolid  IVPB 600 milliGRAM(s) IV Intermittent every 12 hours  meropenem  IVPB 2000 milliGRAM(s) IV Intermittent every 8 hours  midodrine 10 milliGRAM(s) Oral every 8 hours  mupirocin 2% Nasal 1 Application(s) Both Nostrils every 12 hours  norepinephrine Infusion 0.05 MICROgram(s)/kG/Min IV Continuous <Continuous>  nystatin Cream 1 Application(s) Topical two times a day  pantoprazole   Suspension 40 milliGRAM(s) Oral daily  raloxifene 60 milliGRAM(s) Oral daily      T(C): 36.7 (06-04-24 @ 08:00), Max: 36.9 (06-03-24 @ 16:00)  HR: 95 (06-04-24 @ 10:00) (65 - 126)  BP: 98/51 (06-04-24 @ 10:00) (77/50 - 117/70)  RR: 20 (06-04-24 @ 10:00) (16 - 28)  SpO2: 100% (06-04-24 @ 10:00) (95% - 100%)  Wt(kg): --Vital Signs Last 24 Hrs  T(C): 36.7 (04 Jun 2024 08:00), Max: 36.9 (03 Jun 2024 16:00)  T(F): 98.1 (04 Jun 2024 08:00), Max: 98.5 (03 Jun 2024 16:00)  HR: 95 (04 Jun 2024 10:00) (65 - 126)  BP: 98/51 (04 Jun 2024 10:00) (77/50 - 117/70)  BP(mean): 65 (04 Jun 2024 10:00) (59 - 89)  RR: 20 (04 Jun 2024 10:00) (16 - 28)  SpO2: 100% (04 Jun 2024 10:00) (95% - 100%)    Parameters below as of 04 Jun 2024 10:00  Patient On (Oxygen Delivery Method): nasal cannula  O2 Flow (L/min): 2      PHYSICAL EXAM:  General: more awake   HEENT: NCAT             Lungs: Bilateral BS  Cardiovascular: Regular  Abdomen: Soft  Extremities: No clubbing   Skin: warm   Neurological: no focal     Consultant(s) Notes Reviewed:  [x ] YES  [ ] NO  Care Discussed with Consultants/Other Providers [ x] YES  [ ] NO    LABS:                        8.6    10.39 )-----------( 429      ( 04 Jun 2024 07:50 )             27.7     06-04    137  |  99  |  5<L>  ----------------------------<  122<H>  4.1   |  30  |  <0.5<L>      IMPRESSION:  Acute Hypoxemic Hypercapnic Respiratory Failure  Septic Shock   Pneumonia- possibly aspiration  Lactic Acidosis- Resolved  Normocytic Anemia  Hx of Down Syndrome  Hx of Cerebral Palsy/ Nonverbal at baseline  Hx of Seizures  Hx of OP      PLAN: see attestation note     CNS: keep off sedatives    HEENT: Oral Care    PULMONARY: HOB @ 45, aspiration precautions BIPAP as needed, try to wean off, Keep spo2 92 TO 96%. chest PT. Duonebs q6hrs and PRN.  NIV 4 on and 4 off at night and as needed   high flow if needed     CARDIOVASCULAR: Recent echo with normal EF, goal directed fluid resuscitation,    taper pressors for map 65   midodirne 10 mg Q8 hrs     GI: GI prophylaxis.  PEG feeding     RENAL: Strict In/out, keep euvolemic     INFECTIOUS DISEASE: Follow up blood culture. Sputum culture. IV abx: Meropenem, zyvox . Urine strep pneumo & legionella pending collection. Full RVP panel negative. MRSA nares. ID evaluation    HEMATOLOGICAL:  DVT prophylaxis.    ENDOCRINE:  Follow up FS.  Insulin protocol if needed.    MUSCULOSKELETAL: Bedrest    TLC on 5/31      wound specialist   d/c tlc   downgrade to SDU    Ca    8.2<L>      04 Jun 2024 07:50  Phos  2.9     06-04  Mg     2.9     06-04    TPro  5.8<L>  /  Alb  2.7<L>  /  TBili  <0.2  /  DBili  x   /  AST  10  /  ALT  8   /  AlkPhos  74  06-04      Urinalysis Basic - ( 04 Jun 2024 07:50 )    Color: x / Appearance: x / SG: x / pH: x  Gluc: 122 mg/dL / Ketone: x  / Bili: x / Urobili: x   Blood: x / Protein: x / Nitrite: x   Leuk Esterase: x / RBC: x / WBC x   Sq Epi: x / Non Sq Epi: x / Bacteria: x      CAPILLARY BLOOD GLUCOSE      POCT Blood Glucose.: 138 mg/dL (04 Jun 2024 07:47)        Urinalysis Basic - ( 04 Jun 2024 07:50 )    Color: x / Appearance: x / SG: x / pH: x  Gluc: 122 mg/dL / Ketone: x  / Bili: x / Urobili: x   Blood: x / Protein: x / Nitrite: x   Leuk Esterase: x / RBC: x / WBC x   Sq Epi: x / Non Sq Epi: x / Bacteria: x        RADIOLOGY & ADDITIONAL TESTS:    Imaging Personally Reviewed:  [x ] YES  [ ] NO MICU DOWN GRADE NOTE      CC: Patient is a 58y old  Female who presents with a chief complaint of Hypoxia and Tachypnea (04 Jun 2024 10:48)    Over Night Events:  6/4: Patient was brought down to 0.02 levo overnight, became hypotensive. Levo brought back up to 0.04 and patient became hemodynamically stable. In the AM during rounds, RN was able to bring levo to 0.02 again.    HPI:   57 yo F with hx of down syndrome, anemia, cerebral palsy, seizure, osteoporosis, hypotension on midodrine, nonverbal at baseline who was BIBEMS from [facility] on 5/31 for respiratory distress. Per EMS, pt was satting 74% on 4L NC. Pt was admitted 4/16-5/29 for severe sepsis and respiratory failure 2/2 recurrent CAP. During admission, blood cx grew staph capitis and VRE which cleared after daptomycin and meropenem completed on 5/26. BRIANA was negative for endocarditis. Pt also had recurrent vomiting and aspiration despite PEG tube. Further hx limited as pt is nonverbal.     GOC as follows:  [place] [employer] decided on DNR/DNI per their documentation letter. (GOC done on 2/29/2024)    In the ED, patient was given dapto and danna as per previous cultures. Patient was given 3 L of IV fluids and then started on levophed. Labs showed WBC of 17 with left shift. Lactate 3.7->1.4. ABG shows mild respiratory acidosis. BIPAP applied and patient improved. CT abdomen was done. Central line was placed in the right IJ for fluid resuscitation and hemodynamic stability.    In CCU (6/1-6/4) patient was weaned off of bipap and kept off sedatives. Peg tube fed. RVP negative but was found to be MRSA positive. Blood culture remains negative. Per ID, continue IV meropenem 2g q8hrs for septic shock. Vancomycin that patient was initially one was discontinued, and switched over to starting IV linezolid 600mg q12hrs. La Nena Hatch,  of consumer advisory board, was asking for palliative re-eval to determine GOC for patient. Last eval was in April 2024 and circumstances might've changed. They can be reached at [phone]. As of 6/3, patient hemodynamically stable on levo at 0.06 and NIV at night and as needed. Patient was responsive to cheetah, got a total of 750ml fluids, was taken off pressors. BP of 129/58 with MAP of 75 at 1pm. Central line removed, PIV below ac in place for small doses of pressors if needed.    TO DO LIST:  -Follow up blood culture, negative as of 6/3  -Sputum culture pending collection  - Urine strep pneumo & legionella pending collection  -C/w IV abx: Meropenem, zyvox. F/u with ID  - f/u Palliative for GOC    MEDICATIONS:  acetaminophen     Tablet .. 650 milliGRAM(s) Oral every 6 hours PRN  albuterol/ipratropium for Nebulization 3 milliLiter(s) Nebulizer every 6 hours  gabapentin 300 milliGRAM(s) Oral every 12 hours  heparin   Injectable 5000 Unit(s) SubCutaneous every 12 hours  lactated ringers Bolus 250 milliLiter(s) IV Bolus once  levETIRAcetam  Solution 750 milliGRAM(s) Oral two times a day  linezolid  IVPB      linezolid  IVPB 600 milliGRAM(s) IV Intermittent every 12 hours  meropenem  IVPB 2000 milliGRAM(s) IV Intermittent every 8 hours  midodrine 10 milliGRAM(s) Oral every 8 hours  mupirocin 2% Nasal 1 Application(s) Both Nostrils every 12 hours  norepinephrine Infusion 0.05 MICROgram(s)/kG/Min IV Continuous <Continuous>  nystatin Cream 1 Application(s) Topical two times a day  pantoprazole   Suspension 40 milliGRAM(s) Oral daily  raloxifene 60 milliGRAM(s) Oral daily      T(C): 36.7 (06-04-24 @ 08:00), Max: 36.9 (06-03-24 @ 16:00)  HR: 95 (06-04-24 @ 10:00) (65 - 126)  BP: 98/51 (06-04-24 @ 10:00) (77/50 - 117/70)  RR: 20 (06-04-24 @ 10:00) (16 - 28)  SpO2: 100% (06-04-24 @ 10:00) (95% - 100%)  Wt(kg): --Vital Signs Last 24 Hrs  T(C): 36.7 (04 Jun 2024 08:00), Max: 36.9 (03 Jun 2024 16:00)  T(F): 98.1 (04 Jun 2024 08:00), Max: 98.5 (03 Jun 2024 16:00)  HR: 95 (04 Jun 2024 10:00) (65 - 126)  BP: 98/51 (04 Jun 2024 10:00) (77/50 - 117/70)  BP(mean): 65 (04 Jun 2024 10:00) (59 - 89)  RR: 20 (04 Jun 2024 10:00) (16 - 28)  SpO2: 100% (04 Jun 2024 10:00) (95% - 100%)    Parameters below as of 04 Jun 2024 10:00  Patient On (Oxygen Delivery Method): nasal cannula  O2 Flow (L/min): 2      PHYSICAL EXAM:  General: more awake   HEENT: NCAT             Lungs: Bilateral BS  Cardiovascular: Regular  Abdomen: Soft  Extremities: No clubbing   Skin: warm   Neurological: no focal     Consultant(s) Notes Reviewed:  [x ] YES  [ ] NO  Care Discussed with Consultants/Other Providers [ x] YES  [ ] NO    LABS:                        8.6    10.39 )-----------( 429      ( 04 Jun 2024 07:50 )             27.7     06-04    137  |  99  |  5<L>  ----------------------------<  122<H>  4.1   |  30  |  <0.5<L>      IMPRESSION:  Acute Hypoxemic Hypercapnic Respiratory Failure  Septic Shock   Pneumonia- possibly aspiration  Lactic Acidosis- Resolved  Normocytic Anemia  Hx of Down Syndrome  Hx of Cerebral Palsy/ Nonverbal at baseline  Hx of Seizures  Hx of OP      PLAN: see attestation note     CNS: keep off sedatives    HEENT: Oral Care    PULMONARY: HOB @ 45, aspiration precautions BIPAP as needed, try to wean off, Keep spo2 92 TO 96%. chest PT. Duonebs q6hrs and PRN.  NIV 4 on and 4 off at night and as needed   high flow if needed     CARDIOVASCULAR: Recent echo with normal EF, goal directed fluid resuscitation,    taper pressors for map 65   midodirne 10 mg Q8 hrs     GI: GI prophylaxis.  PEG feeding     RENAL: Strict In/out, keep euvolemic     INFECTIOUS DISEASE: Follow up blood culture. Sputum culture. IV abx: Meropenem, zyvox . Urine strep pneumo & legionella pending collection. Full RVP panel negative. MRSA nares. ID evaluation    HEMATOLOGICAL:  DVT prophylaxis.    ENDOCRINE:  Follow up FS.  Insulin protocol if needed.    MUSCULOSKELETAL: Bedrest    TLC on 5/31      wound specialist   d/c tlc   downgrade to SDU    Ca    8.2<L>      04 Jun 2024 07:50  Phos  2.9     06-04  Mg     2.9     06-04    TPro  5.8<L>  /  Alb  2.7<L>  /  TBili  <0.2  /  DBili  x   /  AST  10  /  ALT  8   /  AlkPhos  74  06-04      Urinalysis Basic - ( 04 Jun 2024 07:50 )    Color: x / Appearance: x / SG: x / pH: x  Gluc: 122 mg/dL / Ketone: x  / Bili: x / Urobili: x   Blood: x / Protein: x / Nitrite: x   Leuk Esterase: x / RBC: x / WBC x   Sq Epi: x / Non Sq Epi: x / Bacteria: x      CAPILLARY BLOOD GLUCOSE      POCT Blood Glucose.: 138 mg/dL (04 Jun 2024 07:47)        Urinalysis Basic - ( 04 Jun 2024 07:50 )    Color: x / Appearance: x / SG: x / pH: x  Gluc: 122 mg/dL / Ketone: x  / Bili: x / Urobili: x   Blood: x / Protein: x / Nitrite: x   Leuk Esterase: x / RBC: x / WBC x   Sq Epi: x / Non Sq Epi: x / Bacteria: x        RADIOLOGY & ADDITIONAL TESTS:    Imaging Personally Reviewed:  [x ] YES  [ ] NO

## 2024-06-04 NOTE — DIETITIAN INITIAL EVALUATION ADULT - ADD RECOMMEND
Recommendation: Increase Jevity 1.2 goal rate by 10 mL q4-6hrs as tolerated to goal rate 65 mL/hr provided over 18 hr duration (total daily Jevity 1.2 volume = 1170 mL/day). Provide 150 mL flushes q6hrs. Tube feed regimen at goal provides 1404 kcal, 65 g protein, 1548 mL free H2O.

## 2024-06-04 NOTE — CONSULT NOTE ADULT - PROBLEM SELECTOR RECOMMENDATION 9
- septic, in intensive care unit, c/w IV meropenem, IV linezolid  - c/w IV levophed, high risk, monitor in critical care  - monitor WBC curve, VS

## 2024-06-04 NOTE — DIETITIAN INITIAL EVALUATION ADULT - NSFNSGIIOFT_GEN_A_CORE
Daily wts this admit:  44.6 kg (6/1), 46.8 kg (6/2), 48.2 kg (4/3), 44.6 kg (4/4)    BMI calculated using latest wt 44.6 kg.    IBW: ~37.5 kg.    06-03-24 @ 07:01  -  06-04-24 @ 07:00  --------------------------------------------------------  OUT:  Total OUT: 0 mL    Total NET: 490 mL      06-04-24 @ 07:01  -  06-04-24 @ 22:38  --------------------------------------------------------  OUT:  Total OUT: 0 mL    Total NET: 210 mL

## 2024-06-04 NOTE — PROGRESS NOTE ADULT - SUBJECTIVE AND OBJECTIVE BOX
JERICHOTEA  58y, Female    All available historical data reviewed    OVERNIGHT EVENTS:    no fevers  her usual state  on NC  ROS:  unable to obtain history secondary to patient's mental status     VITALS:  T(F): 98.1, Max: 98.5 (06-03-24 @ 16:00)  HR: 95  BP: 98/51  RR: 20Vital Signs Last 24 Hrs  T(C): 36.7 (04 Jun 2024 08:00), Max: 36.9 (03 Jun 2024 16:00)  T(F): 98.1 (04 Jun 2024 08:00), Max: 98.5 (03 Jun 2024 16:00)  HR: 95 (04 Jun 2024 10:00) (65 - 126)  BP: 98/51 (04 Jun 2024 10:00) (77/50 - 117/70)  BP(mean): 65 (04 Jun 2024 10:00) (59 - 89)  RR: 20 (04 Jun 2024 10:00) (16 - 28)  SpO2: 100% (04 Jun 2024 10:00) (95% - 100%)    Parameters below as of 04 Jun 2024 10:00  Patient On (Oxygen Delivery Method): nasal cannula  O2 Flow (L/min): 2      TESTS & MEASUREMENTS:                        8.6    10.39 )-----------( 429      ( 04 Jun 2024 07:50 )             27.7     06-04    137  |  99  |  5<L>  ----------------------------<  122<H>  4.1   |  30  |  <0.5<L>    Ca    8.2<L>      04 Jun 2024 07:50  Phos  2.9     06-04  Mg     2.9     06-04    TPro  5.8<L>  /  Alb  2.7<L>  /  TBili  <0.2  /  DBili  x   /  AST  10  /  ALT  8   /  AlkPhos  74  06-04    LIVER FUNCTIONS - ( 04 Jun 2024 07:50 )  Alb: 2.7 g/dL / Pro: 5.8 g/dL / ALK PHOS: 74 U/L / ALT: 8 U/L / AST: 10 U/L / GGT: x             Culture - Urine (collected 06-02-24 @ 00:05)  Source: Catheterized None  Final Report (06-03-24 @ 09:27):    <10,000 CFU/mL Normal Urogenital Mili    Urinalysis with Rflx Culture (collected 06-02-24 @ 00:05)    Culture - Blood (collected 05-31-24 @ 19:16)  Source: .Blood Blood-Peripheral  Preliminary Report (06-04-24 @ 01:02):    No growth at 72 Hours    Culture - Blood (collected 05-31-24 @ 19:16)  Source: .Blood Blood-Peripheral  Preliminary Report (06-04-24 @ 01:02):    No growth at 72 Hours      Urinalysis Basic - ( 04 Jun 2024 07:50 )    Color: x / Appearance: x / SG: x / pH: x  Gluc: 122 mg/dL / Ketone: x  / Bili: x / Urobili: x   Blood: x / Protein: x / Nitrite: x   Leuk Esterase: x / RBC: x / WBC x   Sq Epi: x / Non Sq Epi: x / Bacteria: x          RADIOLOGY & ADDITIONAL TESTS:  Personal review of radiological diagnostics performed  Echo and EKG results noted when applicable.     MEDICATIONS:  acetaminophen     Tablet .. 650 milliGRAM(s) Oral every 6 hours PRN  albuterol/ipratropium for Nebulization 3 milliLiter(s) Nebulizer every 6 hours  gabapentin 300 milliGRAM(s) Oral every 12 hours  heparin   Injectable 5000 Unit(s) SubCutaneous every 12 hours  lactated ringers Bolus 250 milliLiter(s) IV Bolus once  levETIRAcetam  Solution 750 milliGRAM(s) Oral two times a day  linezolid  IVPB      linezolid  IVPB 600 milliGRAM(s) IV Intermittent every 12 hours  meropenem  IVPB 2000 milliGRAM(s) IV Intermittent every 8 hours  midodrine 10 milliGRAM(s) Oral every 8 hours  mupirocin 2% Nasal 1 Application(s) Both Nostrils every 12 hours  norepinephrine Infusion 0.05 MICROgram(s)/kG/Min IV Continuous <Continuous>  nystatin Cream 1 Application(s) Topical two times a day  pantoprazole   Suspension 40 milliGRAM(s) Oral daily  raloxifene 60 milliGRAM(s) Oral daily      ANTIBIOTICS:  linezolid  IVPB      linezolid  IVPB 600 milliGRAM(s) IV Intermittent every 12 hours  meropenem  IVPB 2000 milliGRAM(s) IV Intermittent every 8 hours

## 2024-06-04 NOTE — DIETITIAN INITIAL EVALUATION ADULT - SIGNS/SYMPTOMS
current tube feed regimen at goal to provide 57% kcal & 53% protein needs at goal pt with multiple unstageable pressure injuries

## 2024-06-04 NOTE — CONSULT NOTE ADULT - PROBLEM SELECTOR RECOMMENDATION 4
- will follow  ______________  Wu Jenkins MD  Palliative Medicine  [facility]   of Geriatric and Palliative Medicine  [phone]

## 2024-06-04 NOTE — DIETITIAN INITIAL EVALUATION ADULT - PERTINENT MEDS FT
MEDICATIONS  (STANDING):  albuterol/ipratropium for Nebulization 3 milliLiter(s) Nebulizer every 6 hours  gabapentin 300 milliGRAM(s) Oral every 12 hours  heparin   Injectable 5000 Unit(s) SubCutaneous every 12 hours  levETIRAcetam  Solution 750 milliGRAM(s) Oral two times a day  linezolid  IVPB      linezolid  IVPB 600 milliGRAM(s) IV Intermittent every 12 hours  meropenem  IVPB 1000 milliGRAM(s) IV Intermittent every 8 hours  midodrine 10 milliGRAM(s) Oral every 8 hours  mupirocin 2% Nasal 1 Application(s) Both Nostrils every 12 hours  norepinephrine Infusion 0.05 MICROgram(s)/kG/Min (5.16 mL/Hr) IV Continuous <Continuous>  nystatin Cream 1 Application(s) Topical two times a day  pantoprazole   Suspension 40 milliGRAM(s) Oral daily  raloxifene 60 milliGRAM(s) Oral daily    MEDICATIONS  (PRN):  acetaminophen     Tablet .. 650 milliGRAM(s) Oral every 6 hours PRN Temp greater or equal to 38C (100.4F), Mild Pain (1 - 3)

## 2024-06-04 NOTE — DIETITIAN INITIAL EVALUATION ADULT - NUTRITIONGOAL OUTCOME2
Pt to demonstrate tolerance to nutrition support regimen, meeting >85% & <105% of estimated nutrient needs over next 3-5 days.    Pt at high nutrition risk; RD to follow-up in 3-5 days.    Monitor: Skin, labs, BM, wt, nutrition focused physical findings, body composition, GI, tube feed tolerance.

## 2024-06-04 NOTE — CONSULT NOTE ADULT - ASSESSMENT
58F with PMH including Down syndrome, anemia, CP, seizures, OP, hypotension on midodrine, nonverbal at baseline here from [facility] with sepsis and acute respiratory failure, started on pressors, IVF, and NIV. Patient improved on IV abx, and was weaned of NIV. Seen by palliative care in past, and CAB and MHLS advocated for DNR/DNI in past. Palliative care reconsulted for [facility].

## 2024-06-04 NOTE — CONSULT NOTE ADULT - PROBLEM SELECTOR RECOMMENDATION 3
- given patient is under CAB and any withdrawal of care would need MHLS non-objection, will need to determine patient's clinical course prior to further considerations of withdrawal of care (namely CMO, as patient is currently DNR/DNI); patient would need evidence of an end-stage condition for consideration for further limitation of care  - will continue to follow clinical course

## 2024-06-04 NOTE — PROGRESS NOTE ADULT - SUBJECTIVE AND OBJECTIVE BOX
Patient is a 58y old  Female who presents with a chief complaint of Hypoxia and Tachypnea (03 Jun 2024 17:47)      Over Night Events:  Patient was brought down to 0.02 levo overnight, became hypotensive. Levo brought back up to 0.04 and patient became hemodynamically stable. In the AM during rounds, RN was able to bring levo to 0.02 again.    ROS:  See HPI    PHYSICAL EXAM    ICU Vital Signs Last 24 Hrs  T(C): 36.9 (04 Jun 2024 04:00), Max: 36.9 (03 Jun 2024 16:00)  T(F): 98.4 (04 Jun 2024 04:00), Max: 98.5 (03 Jun 2024 16:00)  HR: 86 (04 Jun 2024 06:00) (63 - 126)  BP: 110/62 (04 Jun 2024 06:00) (73/38 - 118/58)  BP(mean): 81 (04 Jun 2024 06:00) (50 - 89)  ABP: --  ABP(mean): --  RR: 16 (04 Jun 2024 06:00) (16 - 28)  SpO2: 100% (04 Jun 2024 06:00) (92% - 100%)    O2 Parameters below as of 04 Jun 2024 06:00  Patient On (Oxygen Delivery Method): nasal cannula  O2 Flow (L/min): 3          General: more awake   HEENT: NCAT             Lungs: Bilateral BS  Cardiovascular: Regular  Abdomen: Soft  Extremities: No clubbing   Skin: warm   Neurological: no focal     I&O's Detail    03 Jun 2024 07:01  -  04 Jun 2024 07:00  --------------------------------------------------------  IN:    Enteral Tube Flush: 130 mL    Free Water: 600 mL    IV PiggyBack: 140 mL    IV PiggyBack: 930 mL    Jevity 1.2: 490 mL    Norepinephrine: 85.6 mL    Sodium Chloride 0.9% Bolus: 500 mL  Total IN: 2875.6 mL    OUT:    Voided (mL): 2800 mL  Total OUT: 2800 mL    Total NET: 75.6 mL          LABS:                          8.2    10.29 )-----------( 407      ( 03 Jun 2024 05:05 )             26.0         03 Jun 2024 05:05    140    |  104    |  5      ----------------------------<  161    4.0     |  30     |  <0.5     Ca    7.8        03 Jun 2024 05:05  Phos  2.7       03 Jun 2024 05:05  Mg     2.2       03 Jun 2024 05:05                                                                                      Urinalysis Basic - ( 03 Jun 2024 05:05 )    Color: x / Appearance: x / SG: x / pH: x  Gluc: 161 mg/dL / Ketone: x  / Bili: x / Urobili: x   Blood: x / Protein: x / Nitrite: x   Leuk Esterase: x / RBC: x / WBC x   Sq Epi: x / Non Sq Epi: x / Bacteria: x        Lactate, Blood: 1.9 mmol/L (06-02-24 @ 00:05)                                                          Culture - Urine (collected 02 Jun 2024 00:05)  Source: Catheterized None  Final Report (03 Jun 2024 09:27):    <10,000 CFU/mL Normal Urogenital Mili    Urinalysis with Rflx Culture (collected 02 Jun 2024 00:05)                                                                                           MEDICATIONS  (STANDING):  albuterol/ipratropium for Nebulization 3 milliLiter(s) Nebulizer every 6 hours  gabapentin 300 milliGRAM(s) Oral every 12 hours  heparin   Injectable 5000 Unit(s) SubCutaneous every 12 hours  levETIRAcetam  Solution 750 milliGRAM(s) Oral two times a day  linezolid  IVPB      linezolid  IVPB 600 milliGRAM(s) IV Intermittent every 12 hours  meropenem  IVPB 2000 milliGRAM(s) IV Intermittent every 8 hours  midodrine 10 milliGRAM(s) Oral every 8 hours  mupirocin 2% Nasal 1 Application(s) Both Nostrils every 12 hours  norepinephrine Infusion 0.05 MICROgram(s)/kG/Min (5.16 mL/Hr) IV Continuous <Continuous>  nystatin Cream 1 Application(s) Topical two times a day  pantoprazole   Suspension 40 milliGRAM(s) Oral daily  raloxifene 60 milliGRAM(s) Oral daily    MEDICATIONS  (PRN):  acetaminophen     Tablet .. 650 milliGRAM(s) Oral every 6 hours PRN Temp greater or equal to 38C (100.4F), Mild Pain (1 - 3)          Xrays:  TLC:  OG:  ET tube:                                                                                       ECHO:  CAM ICU:

## 2024-06-04 NOTE — DIETITIAN INITIAL EVALUATION ADULT - ORAL INTAKE PTA/DIET HISTORY
Pt unable to participate in nutrition interview at this time; somnolent & unable to speak. No family at bedside & unable to reach emergency contact at this time.

## 2024-06-04 NOTE — DIETITIAN INITIAL EVALUATION ADULT - NSFNSPHYEXAMSKINFT_GEN_A_CORE
R heel unstageable pressure injury, R medial foot unstageable pressure injury, L medial forefoot unstageable pressure injury, L heel unstageable pressure injury, L lateral malleolus unstageable pressure injury

## 2024-06-04 NOTE — PROGRESS NOTE ADULT - ASSESSMENT
59 yo F with hx of down syndrome, anemia, cerebral palsy, seizure, osteoporosis, hypotension on midodrine, nonverbal at baseline who was BIBEMS from [facility] for respiratory distress. Per EMS, pt was satting 74% on 4L NC. Pt was admitted 4/16-5/29 for severe sepsis and respiratory failure 2/2 recurrent CAP. During admission, blood cx grew staph capitis and VRE which cleared after daptomycin and meropenem completed on 5/26. BRIANA was negative for endocarditis. Pt also had recurrent vomiting and aspiration despite PEG tube. Further hx limited as pt is nonverbal.     IMPRESSION:  Acute Hypoxemic Hypercapnic Respiratory Failure  Septic Shock   Pneumonia- possibly aspiration  Lactic Acidosis- Resolved  Normocytic Anemia  Hx of Down Syndrome  Hx of Cerebral Palsy/ Nonverbal at baseline  Hx of Seizures  Hx of OP      PLAN: see attestation note     CNS: keep off sedatives    HEENT: Oral Care    PULMONARY: HOB @ 45, aspiration precautions BIPAP as needed, try to wean off, Keep spo2 92 TO 96%. chest PT. Duonebs q6hrs and PRN.  NIV 4 on and 4 off at night and as needed   high flow if needed     CARDIOVASCULAR: Recent echo with normal EF, goal directed fluid resuscitation,    taper pressors for map 65   midodirne 10 mg Q8 hrs     GI: GI prophylaxis.  PEG feeding     RENAL: Strict In/out, keep euvolemic     INFECTIOUS DISEASE: Follow up blood culture. Sputum culture. IV abx: Meropenem, zyvox . Urine strep pneumo & legionella pending collection. Full RVP panel negative. MRSA nares. ID evaluation    HEMATOLOGICAL:  DVT prophylaxis.    ENDOCRINE:  Follow up FS.  Insulin protocol if needed.    MUSCULOSKELETAL: Bedrest    TLC on 5/31      wound specialist   d/c tlc   downgrade to SDU

## 2024-06-04 NOTE — PROGRESS NOTE ADULT - ASSESSMENT
57 yo F with hx of down syndrome, anemia, cerebral palsy, seizure, osteoporosis, hypotension on midodrine, nonverbal at baseline who was BIBEMS from [facility] for respiratory distress. Per EMS, pt was satting 74% on 4L NC. Pt was admitted 4/16-5/29 for severe sepsis and respiratory failure 2/2 recurrent CAP. During admission, blood cx grew staph capitis and VRE which cleared after daptomycin and meropenem completed on 5/26. BRIANA was negative for endocarditis. Pt also had recurrent vomiting and aspiration despite PEG tube. Further hx limited as pt is nonverbal.     IMPRESSION:  Acute Hypoxemic Hypercapnic Respiratory Failure  Septic Shock   Pneumonia- possibly aspiration  Lactic Acidosis- Resolved  Normocytic Anemia  Hx of Down Syndrome  Hx of Cerebral Palsy/ Nonverbal at baseline  Hx of Seizures  Hx of OP      PLAN: see attestation note     CNS: keep off sedatives    HEENT: Oral Care    PULMONARY: HOB @ 45, aspiration precautions BIPAP as needed, try to wean off, Keep spo2 92 TO 96%. chest PT. Duonebs q6hrs and PRN.  NIV 4 on and 4 off at night and as needed   high flow if needed     CARDIOVASCULAR: Recent echo with normal EF, goal directed fluid resuscitation,    taper pressors for map 65     midodirne 10 mg Q8 hrs   GI: GI prophylaxis.  PEG feeding     RENAL: Strict In/out, keep euvolemic     INFECTIOUS DISEASE: Follow up blood culture. Sputum culture. IV abx: Meropenem, zyvox . Urine strep pneumo & legionella. Full RVP panel. MRSA nares. ID evaluation    HEMATOLOGICAL:  DVT prophylaxis.    ENDOCRINE:  Follow up FS.  Insulin protocol if needed.    MUSCULOSKELETAL: Bedrest    TLC on 5/31      wound specialist   d/c tlc   downgrade to SDU

## 2024-06-04 NOTE — CONSULT NOTE ADULT - SUBJECTIVE AND OBJECTIVE BOX
HPI: 58F with PMH including Down syndrome, anemia, CP, seizures, OP, hypotension on midodrine, nonverbal at baseline here from [facility] with sepsis and acute respiratory failure, started on pressors, IVF, and NIV. Patient improved on IV abx, and was weaned of NIV. Seen by palliative care in past, and CAB and [facility] advocated for DNR/DNI in past. Palliative care reconsulted for GOC.    PERTINENT PM/SXH:   Down syndrome    Osteoporosis    Mild anemia    Neuropathy    Cerebral palsy    Seizure    Nonverbal    Hypotension      No significant past surgical history    S/P debridement    S/P percutaneous endoscopic gastrostomy (PEG) tube placement      FAMILY HISTORY:  no pertinent family history      SOCIAL HISTORY:   Significant other/partner[ ]  Children[ ]  Moravian/Spirituality:  Substance hx:  [ ]   Tobacco hx:  [ ]   Alcohol hx: [ ]   Living Situation: [ ]Home  [ ]Long term care  [ ]Rehab [X ]Other: [facility]  Home Services: [ ] HHA [ ] Visting RN [ ] Hospice  Occupation:  Home Opioid hx:  [ ] Y [ ] N [ ] I-Stop Reference No:    ADVANCE DIRECTIVES:    [ ] Full Code [ X] DNR  MOLST  [X ]  Living Will  [ ]   DECISION MAKER(s):  [ ] Health Care Proxy(s)  [ ] Surrogate(s)  [ ] Guardian           Name(s): Phone Number(s): [place] ROB    BASELINE (I)ADL(s) (prior to admission):  [place]: [ ]Total  [ ] Moderate [ ]Dependent  Palliative Performance Status Version 2:         %    [email or web address]/files/news/palliative_performance_scale_[email or web address].pdf    Allergies    chlorhexidine containing compounds (Rash (Mild to Mod))    Intolerances    MEDICATIONS  (STANDING):  albuterol/ipratropium for Nebulization 3 milliLiter(s) Nebulizer every 6 hours  gabapentin 300 milliGRAM(s) Oral every 12 hours  heparin   Injectable 5000 Unit(s) SubCutaneous every 12 hours  lactated ringers Bolus 250 milliLiter(s) IV Bolus once  levETIRAcetam  Solution 750 milliGRAM(s) Oral two times a day  linezolid  IVPB      linezolid  IVPB 600 milliGRAM(s) IV Intermittent every 12 hours  meropenem  IVPB 1000 milliGRAM(s) IV Intermittent every 8 hours  midodrine 10 milliGRAM(s) Oral every 8 hours  mupirocin 2% Nasal 1 Application(s) Both Nostrils every 12 hours  norepinephrine Infusion 0.05 MICROgram(s)/kG/Min (5.16 mL/Hr) IV Continuous <Continuous>  nystatin Cream 1 Application(s) Topical two times a day  pantoprazole   Suspension 40 milliGRAM(s) Oral daily  raloxifene 60 milliGRAM(s) Oral daily    MEDICATIONS  (PRN):  acetaminophen     Tablet .. 650 milliGRAM(s) Oral every 6 hours PRN Temp greater or equal to 38C (100.4F), Mild Pain (1 - 3)      PRESENT SYMPTOMS: [ ]Unable to obtain due to poor mentation   Source if other than patient:  [ ]Family   [ ]Team   [ X]All review of systems negative including pain and dyspnea unless noted below    All components of pain assessment below addressed. Blank spaces indicate that the patient did/could not complete the assessment.  Pain: [ ]yes [ ]no  QOL impact -   Location -                    Aggravating factors -  Quality -  Radiation -  Timing-  Severity (0-10 scale):  Minimal acceptable level (0-10 scale):     CPOT:    https://www.s[facility].org/getattachment/[email or web address]/Critical-Care-Pain-Observation-Tool-(CPOT)    PAIN AD Score:   http://geriatrictoolkit.[place].[facility]/cog/painad.pdf (press ctrl +  left click to view)    Dyspnea:                           [ ]None[ ]Mild [ ]Moderate [ ]Severe     Respiratory Distress Observation Scale (RDOS):   A score of 0 to 2 signifies little or no respiratory distress, 3 signifies mild distress, scores 4 to 6 indicate moderate distress, and scores greater than 7 signify severe distress  [email or web address]://[email or web address]/sites/default/files/PDFS/[email or web address]-cfunjqygblu-sjnazjcw-tkadjzdjzdj-lfqnh.pdf    Anxiety:                             [ ]None[ ]Mild [ ]Moderate [ ]Severe   Fatigue:                             [ ]None[ ]Mild [ ]Moderate [ ]Severe   Nausea:                             [ ]None[ ]Mild [ ]Moderate [ ]Severe   Loss of appetite:              [ ]None[ ]Mild [ ]Moderate [ ]Severe   Constipation:                    [ ]None[ ]Mild [ ]Moderate [ ]Severe    Other Symptoms:      Palliative Performance Status Version 2:         %    http://npcrc.org/files/news/palliative_performance_scale_[email or web address].pdf  PHYSICAL EXAM:  Vital Signs Last 24 Hrs  T(C): 36.6 (04 Jun 2024 12:00), Max: 36.9 (03 Jun 2024 16:00)  T(F): 97.9 (04 Jun 2024 12:00), Max: 98.5 (03 Jun 2024 16:00)  HR: 106 (04 Jun 2024 14:30) (65 - 126)  BP: 100/58 (04 Jun 2024 14:30) (77/50 - 118/68)  BP(mean): 75 (04 Jun 2024 14:30) (59 - 89)  RR: 27 (04 Jun 2024 14:30) (16 - 27)  SpO2: 95% (04 Jun 2024 14:30) (92% - 100%)    Parameters below as of 04 Jun 2024 14:30  Patient On (Oxygen Delivery Method): nasal cannula  O2 Flow (L/min): 2   I&O's Summary    03 Jun 2024 07:01  -  04 Jun 2024 07:00  --------------------------------------------------------  IN: 2875.6 mL / OUT: 2800 mL / NET: 75.6 mL    04 Jun 2024 07:01  -  04 Jun 2024 14:58  --------------------------------------------------------  IN: 1008.3 mL / OUT: 600 mL / NET: 408.3 mL        GENERAL:  [X ] No acute distress [ ]Lethargic  [ ]Unarousable  [ ]Verbal  [ ]Non-Verbal [ ]Cachexia    BEHAVIORAL/PSYCH:  [ ]Alert and Oriented x  [ ] Anxiety [ ] Delirium [ ] Agitation [X ] Calm   EYES: [ ] No scleral icterus [ ] Scleral icterus [ ] Closed  ENMT:  [ ]Dry mouth  [ ]No external oral lesions [ X] No external ear or nose lesions  CARDIOVASCULAR:  [ ]Regular [ ]Irregular [ ]Tachy [ ]Not Tachy  [ ]Raheem [ ] Edema [ ] No edema  PULMONARY:  [ ]Tachypnea  [ ]Audible excessive secretions [X ] No labored breathing [ ] labored breathing  GASTROINTESTINAL: [ ]Soft  [ ]Distended  [ X]Not distended [ ]Non tender [ ]Tender  MUSCULOSKELETAL: [ ]No clubbing [ ] clubbing  [ X] No cyanosis [ ] cyanosis  NEUROLOGIC: [ ]No focal deficits  [ ]Follows commands  [ ]Does not follow commands  [ ]Cognitive impairment  [ ]Dysphagia  [ ]Dysarthria  [ ]Paresis   SKIN: [ ] Jaundiced [X ] Non-jaundiced [ ]Rash [ ]No Rash [ ] Warm [ ] Dry  MISC/LINES: [ ] ET tube [ ] Trach [ ]NGT/OGT [ ]PEG [ ]Madsen    CRITICAL CARE:  [ ] Shock Present  [ ]Septic [ ]Cardiogenic [ ]Neurologic [ ]Hypovolemic  [ ]  Vasopressors [ ]  Inotropes   [ ]Respiratory failure present [ ]Mechanical ventilation [ ]Non-invasive ventilatory support [ ]High flow  [ ]Acute  [ ]Chronic [ ]Hypoxic  [ ]Hypercarbic [ ]Other  [ ]Other organ failure     LABS: reviewed by me, notable for:                         8.6    10.39 )-----------( 429      ( 04 Jun 2024 07:50 )             27.7   06-04    137  |  99  |  5<L>  ----------------------------<  122<H>  4.1   |  30  |  <0.5<L>    Ca    8.2<L>      04 Jun 2024 07:50  Phos  2.9     06-04  Mg     2.9     06-04    TPro  5.8<L>  /  Alb  2.7<L>  /  TBili  <0.2  /  DBili  x   /  AST  10  /  ALT  8   /  AlkPhos  74  06-04      Urinalysis Basic - ( 04 Jun 2024 07:50 )    Color: x / Appearance: x / SG: x / pH: x  Gluc: 122 mg/dL / Ketone: x  / Bili: x / Urobili: x   Blood: x / Protein: x / Nitrite: x   Leuk Esterase: x / RBC: x / WBC x   Sq Epi: x / Non Sq Epi: x / Bacteria: x      RADIOLOGY & ADDITIONAL STUDIES: CXR personally reviewed by me:    PROTEIN CALORIE MALNUTRITION PRESENT: [ ]mild [ ]moderate [ ]severe [ ]underweight [ ]morbid obesity  https://www.andeal.org/vault/2440/web/files/ONC/Table_Clinical%20Characteristics%20to%20Document%20Malnutrition-White%20JV%20et%20al%202012.pdf    Height (cm): 134 (05-31-24 @ 18:19), 134 (05-14-24 @ 02:54), 134 (04-11-24 @ 13:16)  Weight (kg): 40 (05-31-24 @ 19:53), 42.7 (05-14-24 @ 02:54), 52 (04-02-24 @ 12:18)  BMI (kg/m2): 22.3 (05-31-24 @ 19:53), 23.8 (05-31-24 @ 18:19), 23.8 (05-14-24 @ 02:54)    [ ]PPSV2 < or = to 30% [ ]significant weight loss  [ ]poor nutritional intake  [ ]anasarca      [ ]Artificial Nutrition          Palliative Care Spiritual/Emotional Screening Tool Question  Severity (0-4):                    OR                    [X ] Unable to determine/NA  Score of 2 or greater indicates recommendation of Chaplaincy referral  Chaplaincy Referral: [ ] Yes [ ] Refused [ ] Following     Caregiver [place]:  [ ] Yes [ ] No    OR    [x ] Unable to determine. Will assess at later time if appropriate.  Social Work Referral [ ]  Patient and Family Centered Care Referral [ ]    Anticipatory Grief Present: [ ] Yes [ ] No    OR     [ x] Unable to determine. Will assess at later time if appropriate.  Social Work Referral [ ]  Patient and Family Centered Care Referral [ ]    REFERRALS:   [ ]Chaplaincy  [ ]Hospice  [ ]Child Life  [ ]Social Work  [ ]Case management [ ]Holistic Therapy     Palliative care education provided to patient and/or family    Goals of Care Document:     ______________  Wu Jenkins MD  Palliative Medicine  [facility]   of Geriatric and Palliative Medicine  [phone]

## 2024-06-04 NOTE — DIETITIAN INITIAL EVALUATION ADULT - OTHER INFO
Pertinent medical Information: Pt BIBEMS from [facility] [place] for respiratory distress. Pt was admitted 4/16-5/29 for severe sepsis and respiratory failure 2/2 recurrent CAP. During admission, blood cx grew staph capitis and VRE which cleared after daptomycin and meropenem completed on 5/26. Pt also had recurrent vomiting and aspiration despite PEG tube. Septic shock noted. Acute Hypoxemic Hypercapnic Respiratory Failure noted.    PMH includes down syndrome, anemia, cerebral palsy, seizure, osteoporosis, hypotension on midodrine, nonverbal at baseline.

## 2024-06-04 NOTE — DIETITIAN INITIAL EVALUATION ADULT - NSFNSGIASSESSMENTFT_GEN_A_CORE
last BM 6/4; multiple loose BMs - latest BMs reportedly soft as opposed to loose. Abd noted non-distended at this time.

## 2024-06-04 NOTE — DIETITIAN INITIAL EVALUATION ADULT - OTHER CALCULATIONS
Significantly increased protein needs in setting of multiple unstageable pressure injuries.  Current tube feed regimen at goal to provide 57% kcal & 53% protein needs at goal.

## 2024-06-05 ENCOUNTER — APPOINTMENT (OUTPATIENT)
Dept: NEUROLOGY | Facility: CLINIC | Age: 59
End: 2024-06-05

## 2024-06-05 LAB
ALBUMIN SERPL ELPH-MCNC: 2.7 G/DL — LOW (ref 3.5–5.2)
ALP SERPL-CCNC: 82 U/L — SIGNIFICANT CHANGE UP (ref 30–115)
ALT FLD-CCNC: 9 U/L — SIGNIFICANT CHANGE UP (ref 0–41)
ANION GAP SERPL CALC-SCNC: 9 MMOL/L — SIGNIFICANT CHANGE UP (ref 7–14)
AST SERPL-CCNC: 12 U/L — SIGNIFICANT CHANGE UP (ref 0–41)
BASOPHILS # BLD AUTO: 0.07 K/UL — SIGNIFICANT CHANGE UP (ref 0–0.2)
BASOPHILS NFR BLD AUTO: 1 % — SIGNIFICANT CHANGE UP (ref 0–1)
BILIRUB SERPL-MCNC: <0.2 MG/DL — SIGNIFICANT CHANGE UP (ref 0.2–1.2)
BUN SERPL-MCNC: 7 MG/DL — LOW (ref 10–20)
CALCIUM SERPL-MCNC: 8.4 MG/DL — SIGNIFICANT CHANGE UP (ref 8.4–10.5)
CHLORIDE SERPL-SCNC: 97 MMOL/L — LOW (ref 98–110)
CO2 SERPL-SCNC: 28 MMOL/L — SIGNIFICANT CHANGE UP (ref 17–32)
CREAT SERPL-MCNC: 0.5 MG/DL — LOW (ref 0.7–1.5)
EGFR: 109 ML/MIN/1.73M2 — SIGNIFICANT CHANGE UP
EOSINOPHIL # BLD AUTO: 0.52 K/UL — SIGNIFICANT CHANGE UP (ref 0–0.7)
EOSINOPHIL NFR BLD AUTO: 7.2 % — SIGNIFICANT CHANGE UP (ref 0–8)
GLUCOSE SERPL-MCNC: 92 MG/DL — SIGNIFICANT CHANGE UP (ref 70–99)
HCT VFR BLD CALC: 27.7 % — LOW (ref 37–47)
HGB BLD-MCNC: 8.6 G/DL — LOW (ref 12–16)
IMM GRANULOCYTES NFR BLD AUTO: 0.4 % — HIGH (ref 0.1–0.3)
LYMPHOCYTES # BLD AUTO: 1.79 K/UL — SIGNIFICANT CHANGE UP (ref 1.2–3.4)
LYMPHOCYTES # BLD AUTO: 24.7 % — SIGNIFICANT CHANGE UP (ref 20.5–51.1)
MAGNESIUM SERPL-MCNC: 2.4 MG/DL — SIGNIFICANT CHANGE UP (ref 1.8–2.4)
MCHC RBC-ENTMCNC: 28.8 PG — SIGNIFICANT CHANGE UP (ref 27–31)
MCHC RBC-ENTMCNC: 31 G/DL — LOW (ref 32–37)
MCV RBC AUTO: 92.6 FL — SIGNIFICANT CHANGE UP (ref 81–99)
MONOCYTES # BLD AUTO: 0.33 K/UL — SIGNIFICANT CHANGE UP (ref 0.1–0.6)
MONOCYTES NFR BLD AUTO: 4.6 % — SIGNIFICANT CHANGE UP (ref 1.7–9.3)
NEUTROPHILS # BLD AUTO: 4.5 K/UL — SIGNIFICANT CHANGE UP (ref 1.4–6.5)
NEUTROPHILS NFR BLD AUTO: 62.1 % — SIGNIFICANT CHANGE UP (ref 42.2–75.2)
NRBC # BLD: 0 /100 WBCS — SIGNIFICANT CHANGE UP (ref 0–0)
PHOSPHATE SERPL-MCNC: 3.7 MG/DL — SIGNIFICANT CHANGE UP (ref 2.1–4.9)
PLATELET # BLD AUTO: 430 K/UL — HIGH (ref 130–400)
PMV BLD: 9.6 FL — SIGNIFICANT CHANGE UP (ref 7.4–10.4)
POTASSIUM SERPL-MCNC: 4.3 MMOL/L — SIGNIFICANT CHANGE UP (ref 3.5–5)
POTASSIUM SERPL-SCNC: 4.3 MMOL/L — SIGNIFICANT CHANGE UP (ref 3.5–5)
PROT SERPL-MCNC: 6.1 G/DL — SIGNIFICANT CHANGE UP (ref 6–8)
RBC # BLD: 2.99 M/UL — LOW (ref 4.2–5.4)
RBC # FLD: 18.8 % — HIGH (ref 11.5–14.5)
SODIUM SERPL-SCNC: 134 MMOL/L — LOW (ref 135–146)
WBC # BLD: 7.24 K/UL — SIGNIFICANT CHANGE UP (ref 4.8–10.8)
WBC # FLD AUTO: 7.24 K/UL — SIGNIFICANT CHANGE UP (ref 4.8–10.8)

## 2024-06-05 PROCEDURE — 71045 X-RAY EXAM CHEST 1 VIEW: CPT | Mod: 26

## 2024-06-05 PROCEDURE — 99233 SBSQ HOSP IP/OBS HIGH 50: CPT

## 2024-06-05 RX ORDER — SODIUM CHLORIDE 9 MG/ML
500 INJECTION, SOLUTION INTRAVENOUS ONCE
Refills: 0 | Status: COMPLETED | OUTPATIENT
Start: 2024-06-05 | End: 2024-06-05

## 2024-06-05 RX ADMIN — GABAPENTIN 300 MILLIGRAM(S): 400 CAPSULE ORAL at 17:12

## 2024-06-05 RX ADMIN — Medication 3 MILLILITER(S): at 13:44

## 2024-06-05 RX ADMIN — NYSTATIN CREAM 1 APPLICATION(S): 100000 CREAM TOPICAL at 06:30

## 2024-06-05 RX ADMIN — Medication 3 MILLILITER(S): at 19:37

## 2024-06-05 RX ADMIN — MEROPENEM 100 MILLIGRAM(S): 1 INJECTION INTRAVENOUS at 21:12

## 2024-06-05 RX ADMIN — Medication 3 MILLILITER(S): at 01:32

## 2024-06-05 RX ADMIN — HEPARIN SODIUM 5000 UNIT(S): 5000 INJECTION INTRAVENOUS; SUBCUTANEOUS at 17:12

## 2024-06-05 RX ADMIN — LINEZOLID 300 MILLIGRAM(S): 600 INJECTION, SOLUTION INTRAVENOUS at 17:13

## 2024-06-05 RX ADMIN — MIDODRINE HYDROCHLORIDE 10 MILLIGRAM(S): 2.5 TABLET ORAL at 21:12

## 2024-06-05 RX ADMIN — MUPIROCIN 1 APPLICATION(S): 20 OINTMENT TOPICAL at 17:14

## 2024-06-05 RX ADMIN — Medication 3 MILLILITER(S): at 08:07

## 2024-06-05 RX ADMIN — LINEZOLID 300 MILLIGRAM(S): 600 INJECTION, SOLUTION INTRAVENOUS at 05:18

## 2024-06-05 RX ADMIN — HEPARIN SODIUM 5000 UNIT(S): 5000 INJECTION INTRAVENOUS; SUBCUTANEOUS at 05:17

## 2024-06-05 RX ADMIN — MUPIROCIN 1 APPLICATION(S): 20 OINTMENT TOPICAL at 05:18

## 2024-06-05 RX ADMIN — NYSTATIN CREAM 1 APPLICATION(S): 100000 CREAM TOPICAL at 18:49

## 2024-06-05 RX ADMIN — LEVETIRACETAM 750 MILLIGRAM(S): 250 TABLET, FILM COATED ORAL at 05:15

## 2024-06-05 RX ADMIN — PANTOPRAZOLE SODIUM 40 MILLIGRAM(S): 20 TABLET, DELAYED RELEASE ORAL at 11:54

## 2024-06-05 RX ADMIN — MEROPENEM 100 MILLIGRAM(S): 1 INJECTION INTRAVENOUS at 14:25

## 2024-06-05 RX ADMIN — GABAPENTIN 300 MILLIGRAM(S): 400 CAPSULE ORAL at 05:15

## 2024-06-05 RX ADMIN — MIDODRINE HYDROCHLORIDE 10 MILLIGRAM(S): 2.5 TABLET ORAL at 05:15

## 2024-06-05 RX ADMIN — LEVETIRACETAM 750 MILLIGRAM(S): 250 TABLET, FILM COATED ORAL at 17:13

## 2024-06-05 RX ADMIN — MIDODRINE HYDROCHLORIDE 10 MILLIGRAM(S): 2.5 TABLET ORAL at 14:26

## 2024-06-05 RX ADMIN — MEROPENEM 100 MILLIGRAM(S): 1 INJECTION INTRAVENOUS at 05:19

## 2024-06-05 RX ADMIN — RALOXIFENE HYDROCHLORIDE 60 MILLIGRAM(S): 60 TABLET, COATED ORAL at 11:56

## 2024-06-05 RX ADMIN — SODIUM CHLORIDE 500 MILLILITER(S): 9 INJECTION, SOLUTION INTRAVENOUS at 15:11

## 2024-06-05 NOTE — PROGRESS NOTE ADULT - ASSESSMENT
IMPRESSION:    Acute Hypoxemic / Hypercapnic Respiratory Failure recurrent admision  Pneumonia- possibly aspiration  Lactic Acidosis- Resolved  Normocytic Anemia  Hx of Down Syndrome  Hx of Cerebral Palsy/ Nonverbal at baseline  Hx of Seizures  Hx of OP      PLAN:    CNS: keep off sedatives, AED as OP    HEENT: Oral Care    PULMONARY: HOB @ 45, aspiration precautions BIPAP as needed, Keep spo2 92 TO 96%. chest PT. Duonebs q6hrs and PRN.    CARDIOVASCULAR: Recent echo with normal EF, goal directed fluid resuscitation,    taper pressors for map 65     midodirne 10 mg Q8 hrs     GI: GI prophylaxis.  PEG feeding     RENAL: Strict In/out, Trend CMP    INFECTIOUS DISEASE: ABX PER ID    HEMATOLOGICAL:  DVT prophylaxis.    ENDOCRINE:  Follow up FS.  Insulin protocol if needed.    MUSCULOSKELETAL: Bedrest    SDU  POOR PROGNOSIS  DNR/I

## 2024-06-05 NOTE — PROGRESS NOTE ADULT - SUBJECTIVE AND OBJECTIVE BOX
Pt seen, calm, sitting up in bed    T(F): , Max: 98.2 (06-05-24 @ 20:00)  HR: 107 (06-05-24 @ 20:00) (95 - 107)  BP: 87/55 (06-05-24 @ 20:00)  RR: 18 (06-05-24 @ 08:00)  SpO2: 96% (06-05-24 @ 20:00)  IN: 1378.3 mL / OUT: 800 mL / NET: 578.3 mL    IN: 650 mL / OUT: 1000 mL / NET: -350 mL      General: No apparent distress  Cardiovascular: S1, S2  Gastrointestinal: Soft, Non-tender, Non-distended  Respiratory: Good air entry bilaterally  Musculoskeletal: Moves all extremities  Lymphatic: No edema  Neurologic: MR at baseline  Dermatologic: Skin dry                          8.6    7.24  )-----------( 430      ( 05 Jun 2024 05:45 )             27.7     06-05    134<L>  |  97<L>  |  7<L>  ----------------------------<  92  4.3   |  28  |  0.5<L>    Ca    8.4      05 Jun 2024 05:45  Phos  3.7     06-05  Mg     2.4     06-05    TPro  6.1  /  Alb  2.7<L>  /  TBili  <0.2  /  DBili  x   /  AST  12  /  ALT  9   /  AlkPhos  82  06-05

## 2024-06-05 NOTE — PROGRESS NOTE ADULT - SUBJECTIVE AND OBJECTIVE BOX
Over Night Events: events noted, afebrile, transferred from CCU, NIV overnight    PHYSICAL EXAM    ICU Vital Signs Last 24 Hrs  T(C): 36.7 (05 Jun 2024 00:00), Max: 36.9 (04 Jun 2024 16:00)  T(F): 98.1 (05 Jun 2024 00:00), Max: 98.4 (04 Jun 2024 16:00)  HR: 102 (05 Jun 2024 04:00) (85 - 120)  BP: 89/62 (05 Jun 2024 04:00) (79/42 - 143/72)  BP(mean): 71 (05 Jun 2024 04:00) (59 - 94)-  RR: 25 (04 Jun 2024 17:00) (16 - 28)  SpO2: 95% (05 Jun 2024 04:00) (92% - 100%)    O2 Parameters below as of 04 Jun 2024 17:00  Patient On (Oxygen Delivery Method): nasal cannula  O2 Flow (L/min): 2          General: ill looking  Lungs: Bilateral rhonchi  Cardiovascular: Regular   Abdomen: Soft, Positive BS  Extremities: No clubbing   not following commands    06-03-24 @ 07:01  -  06-04-24 @ 07:00  --------------------------------------------------------  IN:    Enteral Tube Flush: 130 mL    Free Water: 600 mL    IV PiggyBack: 140 mL    IV PiggyBack: 930 mL    Jevity 1.2: 490 mL    Norepinephrine: 85.6 mL    Sodium Chloride 0.9% Bolus: 500 mL  Total IN: 2875.6 mL    OUT:    Voided (mL): 2800 mL  Total OUT: 2800 mL    Total NET: 75.6 mL      06-04-24 @ 07:01  -  06-05-24 @ 05:48  --------------------------------------------------------  IN:    Enteral Tube Flush: 60 mL    IV PiggyBack: 50 mL    IV PiggyBack: 500 mL    IV PiggyBack: 300 mL    Jevity 1.2: 210 mL    Lactated Ringers Bolus: 250 mL    Norepinephrine: 8.3 mL  Total IN: 1378.3 mL    OUT:    Voided (mL): 800 mL  Total OUT: 800 mL    Total NET: 578.3 mL          LABS:                          8.6    10.39 )-----------( 429      ( 04 Jun 2024 07:50 )             27.7                                               06-04    137  |  99  |  5<L>  ----------------------------<  122<H>  4.1   |  30  |  <0.5<L>    Ca    8.2<L>      04 Jun 2024 07:50  Phos  2.9     06-04  Mg     2.9     06-04    TPro  5.8<L>  /  Alb  2.7<L>  /  TBili  <0.2  /  DBili  x   /  AST  10  /  ALT  8   /  AlkPhos  74  06-04                                             Urinalysis Basic - ( 04 Jun 2024 07:50 )    Color: x / Appearance: x / SG: x / pH: x  Gluc: 122 mg/dL / Ketone: x  / Bili: x / Urobili: x   Blood: x / Protein: x / Nitrite: x   Leuk Esterase: x / RBC: x / WBC x   Sq Epi: x / Non Sq Epi: x / Bacteria: x                                                  LIVER FUNCTIONS - ( 04 Jun 2024 07:50 )  Alb: 2.7 g/dL / Pro: 5.8 g/dL / ALK PHOS: 74 U/L / ALT: 8 U/L / AST: 10 U/L / GGT: x                                                                                                                                       MEDICATIONS  (STANDING):  albuterol/ipratropium for Nebulization 3 milliLiter(s) Nebulizer every 6 hours  gabapentin 300 milliGRAM(s) Oral every 12 hours  heparin   Injectable 5000 Unit(s) SubCutaneous every 12 hours  levETIRAcetam  Solution 750 milliGRAM(s) Oral two times a day  linezolid  IVPB      linezolid  IVPB 600 milliGRAM(s) IV Intermittent every 12 hours  meropenem  IVPB 1000 milliGRAM(s) IV Intermittent every 8 hours  midodrine 10 milliGRAM(s) Oral every 8 hours  mupirocin 2% Nasal 1 Application(s) Both Nostrils every 12 hours  norepinephrine Infusion 0.05 MICROgram(s)/kG/Min (5.16 mL/Hr) IV Continuous <Continuous>  nystatin Cream 1 Application(s) Topical two times a day  pantoprazole   Suspension 40 milliGRAM(s) Oral daily  raloxifene 60 milliGRAM(s) Oral daily    MEDICATIONS  (PRN):  acetaminophen     Tablet .. 650 milliGRAM(s) Oral every 6 hours PRN Temp greater or equal to 38C (100.4F), Mild Pain (1 - 3)    cxr reviewed

## 2024-06-05 NOTE — PROGRESS NOTE ADULT - ASSESSMENT
57 yo F with hx of down syndrome, anemia, cerebral palsy, seizure, osteoporosis, chronic hypotension on midodrine p/w respiratory failure 2/2 presumed aspiration pna    Acute Hypoxemic Hypercapnic Respiratory Failure 2/2 possible pna  - zyvox and merrem    Septic Shock   - on pressors and midodrine    Hx of Down Syndrome  Hx of Cerebral Palsy  -  at baseline    Hx of Seizures  - no evidence of seziure currently    PEG for feeds

## 2024-06-06 LAB
ALBUMIN SERPL ELPH-MCNC: 2.6 G/DL — LOW (ref 3.5–5.2)
ALP SERPL-CCNC: 94 U/L — SIGNIFICANT CHANGE UP (ref 30–115)
ALT FLD-CCNC: 9 U/L — SIGNIFICANT CHANGE UP (ref 0–41)
ANION GAP SERPL CALC-SCNC: 11 MMOL/L — SIGNIFICANT CHANGE UP (ref 7–14)
AST SERPL-CCNC: 13 U/L — SIGNIFICANT CHANGE UP (ref 0–41)
BASOPHILS # BLD AUTO: 0.07 K/UL — SIGNIFICANT CHANGE UP (ref 0–0.2)
BASOPHILS NFR BLD AUTO: 0.9 % — SIGNIFICANT CHANGE UP (ref 0–1)
BILIRUB SERPL-MCNC: <0.2 MG/DL — SIGNIFICANT CHANGE UP (ref 0.2–1.2)
BLD GP AB SCN SERPL QL: SIGNIFICANT CHANGE UP
BUN SERPL-MCNC: 9 MG/DL — LOW (ref 10–20)
CALCIUM SERPL-MCNC: 8.3 MG/DL — LOW (ref 8.4–10.4)
CHLORIDE SERPL-SCNC: 99 MMOL/L — SIGNIFICANT CHANGE UP (ref 98–110)
CO2 SERPL-SCNC: 27 MMOL/L — SIGNIFICANT CHANGE UP (ref 17–32)
CREAT SERPL-MCNC: 0.5 MG/DL — LOW (ref 0.7–1.5)
CULTURE RESULTS: SIGNIFICANT CHANGE UP
CULTURE RESULTS: SIGNIFICANT CHANGE UP
EGFR: 109 ML/MIN/1.73M2 — SIGNIFICANT CHANGE UP
EOSINOPHIL # BLD AUTO: 0.53 K/UL — SIGNIFICANT CHANGE UP (ref 0–0.7)
EOSINOPHIL NFR BLD AUTO: 6.7 % — SIGNIFICANT CHANGE UP (ref 0–8)
GLUCOSE SERPL-MCNC: 93 MG/DL — SIGNIFICANT CHANGE UP (ref 70–99)
HCT VFR BLD CALC: 27.1 % — LOW (ref 37–47)
HGB BLD-MCNC: 8.8 G/DL — LOW (ref 12–16)
IMM GRANULOCYTES NFR BLD AUTO: 0.4 % — HIGH (ref 0.1–0.3)
LEVETIRACETAM SERPL-MCNC: 25.6 UG/ML — SIGNIFICANT CHANGE UP (ref 10–40)
LYMPHOCYTES # BLD AUTO: 1.73 K/UL — SIGNIFICANT CHANGE UP (ref 1.2–3.4)
LYMPHOCYTES # BLD AUTO: 21.9 % — SIGNIFICANT CHANGE UP (ref 20.5–51.1)
MAGNESIUM SERPL-MCNC: 2.5 MG/DL — HIGH (ref 1.8–2.4)
MCHC RBC-ENTMCNC: 29.5 PG — SIGNIFICANT CHANGE UP (ref 27–31)
MCHC RBC-ENTMCNC: 32.5 G/DL — SIGNIFICANT CHANGE UP (ref 32–37)
MCV RBC AUTO: 90.9 FL — SIGNIFICANT CHANGE UP (ref 81–99)
MONOCYTES # BLD AUTO: 0.43 K/UL — SIGNIFICANT CHANGE UP (ref 0.1–0.6)
MONOCYTES NFR BLD AUTO: 5.4 % — SIGNIFICANT CHANGE UP (ref 1.7–9.3)
NEUTROPHILS # BLD AUTO: 5.12 K/UL — SIGNIFICANT CHANGE UP (ref 1.4–6.5)
NEUTROPHILS NFR BLD AUTO: 64.7 % — SIGNIFICANT CHANGE UP (ref 42.2–75.2)
NRBC # BLD: 0 /100 WBCS — SIGNIFICANT CHANGE UP (ref 0–0)
PHOSPHATE SERPL-MCNC: 3.7 MG/DL — SIGNIFICANT CHANGE UP (ref 2.1–4.9)
PLATELET # BLD AUTO: 425 K/UL — HIGH (ref 130–400)
PMV BLD: 9.5 FL — SIGNIFICANT CHANGE UP (ref 7.4–10.4)
POTASSIUM SERPL-MCNC: 4.8 MMOL/L — SIGNIFICANT CHANGE UP (ref 3.5–5)
POTASSIUM SERPL-SCNC: 4.8 MMOL/L — SIGNIFICANT CHANGE UP (ref 3.5–5)
PROT SERPL-MCNC: 6.2 G/DL — SIGNIFICANT CHANGE UP (ref 6–8)
RBC # BLD: 2.98 M/UL — LOW (ref 4.2–5.4)
RBC # FLD: 18.9 % — HIGH (ref 11.5–14.5)
SODIUM SERPL-SCNC: 137 MMOL/L — SIGNIFICANT CHANGE UP (ref 135–146)
SPECIMEN SOURCE: SIGNIFICANT CHANGE UP
SPECIMEN SOURCE: SIGNIFICANT CHANGE UP
WBC # BLD: 7.91 K/UL — SIGNIFICANT CHANGE UP (ref 4.8–10.8)
WBC # FLD AUTO: 7.91 K/UL — SIGNIFICANT CHANGE UP (ref 4.8–10.8)

## 2024-06-06 PROCEDURE — 99232 SBSQ HOSP IP/OBS MODERATE 35: CPT

## 2024-06-06 PROCEDURE — 99233 SBSQ HOSP IP/OBS HIGH 50: CPT

## 2024-06-06 PROCEDURE — 71045 X-RAY EXAM CHEST 1 VIEW: CPT | Mod: 26

## 2024-06-06 RX ADMIN — LEVETIRACETAM 750 MILLIGRAM(S): 250 TABLET, FILM COATED ORAL at 17:46

## 2024-06-06 RX ADMIN — LINEZOLID 300 MILLIGRAM(S): 600 INJECTION, SOLUTION INTRAVENOUS at 17:54

## 2024-06-06 RX ADMIN — MIDODRINE HYDROCHLORIDE 10 MILLIGRAM(S): 2.5 TABLET ORAL at 22:06

## 2024-06-06 RX ADMIN — NYSTATIN CREAM 1 APPLICATION(S): 100000 CREAM TOPICAL at 17:44

## 2024-06-06 RX ADMIN — MUPIROCIN 1 APPLICATION(S): 20 OINTMENT TOPICAL at 05:43

## 2024-06-06 RX ADMIN — MEROPENEM 100 MILLIGRAM(S): 1 INJECTION INTRAVENOUS at 22:06

## 2024-06-06 RX ADMIN — GABAPENTIN 300 MILLIGRAM(S): 400 CAPSULE ORAL at 17:43

## 2024-06-06 RX ADMIN — HEPARIN SODIUM 5000 UNIT(S): 5000 INJECTION INTRAVENOUS; SUBCUTANEOUS at 17:43

## 2024-06-06 RX ADMIN — GABAPENTIN 300 MILLIGRAM(S): 400 CAPSULE ORAL at 05:41

## 2024-06-06 RX ADMIN — RALOXIFENE HYDROCHLORIDE 60 MILLIGRAM(S): 60 TABLET, COATED ORAL at 11:45

## 2024-06-06 RX ADMIN — Medication 3 MILLILITER(S): at 20:03

## 2024-06-06 RX ADMIN — HEPARIN SODIUM 5000 UNIT(S): 5000 INJECTION INTRAVENOUS; SUBCUTANEOUS at 05:40

## 2024-06-06 RX ADMIN — LINEZOLID 300 MILLIGRAM(S): 600 INJECTION, SOLUTION INTRAVENOUS at 05:42

## 2024-06-06 RX ADMIN — LEVETIRACETAM 750 MILLIGRAM(S): 250 TABLET, FILM COATED ORAL at 05:41

## 2024-06-06 RX ADMIN — NYSTATIN CREAM 1 APPLICATION(S): 100000 CREAM TOPICAL at 05:54

## 2024-06-06 RX ADMIN — Medication 3 MILLILITER(S): at 13:51

## 2024-06-06 RX ADMIN — PANTOPRAZOLE SODIUM 40 MILLIGRAM(S): 20 TABLET, DELAYED RELEASE ORAL at 11:45

## 2024-06-06 RX ADMIN — MIDODRINE HYDROCHLORIDE 10 MILLIGRAM(S): 2.5 TABLET ORAL at 05:41

## 2024-06-06 RX ADMIN — MEROPENEM 100 MILLIGRAM(S): 1 INJECTION INTRAVENOUS at 05:40

## 2024-06-06 RX ADMIN — MUPIROCIN 1 APPLICATION(S): 20 OINTMENT TOPICAL at 17:46

## 2024-06-06 RX ADMIN — MIDODRINE HYDROCHLORIDE 10 MILLIGRAM(S): 2.5 TABLET ORAL at 13:25

## 2024-06-06 RX ADMIN — MEROPENEM 100 MILLIGRAM(S): 1 INJECTION INTRAVENOUS at 13:26

## 2024-06-06 NOTE — ADVANCED PRACTICE NURSE CONSULT - REASON FOR CONSULT
Wound Assessment and Treatment Recommendation
Pressure Injury Assessment and Treatment Recommendation

## 2024-06-06 NOTE — ADVANCED PRACTICE NURSE CONSULT - ASSESSMENT
History of Present Illness:    57 yo F with hx of down syndrome, anemia, cerebral palsy, seizure, osteoporosis, hypotension on midodrine, nonverbal at baseline who was BIBEMS from [facility] for respiratory distress. Per EMS, pt was satting 74% on 4L NC. Pt was admitted 4/16-5/29 for severe sepsis and respiratory failure 2/2 recurrent CAP. During admission, blood cx grew staph capitis and VRE which cleared after daptomycin and meropenem completed on 5/26. BRIANA was negative for endocarditis. Pt also had recurrent vomiting and aspiration despite PEG tube. Further hx limited as pt is nonverbal.   In the ED, patient was given dapto and danna as per previous cultures. Patient was given 3 L of IV fluids and then started on levophed. Labs showed WBC of 17 with left shift. Lactate 3.7->1.4. ABG shows mild respiratory acidosis. BIPAP applied and patient improved.     Allergies and Intolerances:        Allergies:  	chlorhexidine containing compounds: Drug Category, Rash (Mild to Mod)    Home Medications:   * Patient Currently Takes Medications as of 31-May-2024 23:35 documented in Structured Notes  · 	Keppra 100 mg/mL oral solution: Last Dose Taken:  , 7.5 milliliter(s) orally 2 times a day by peg  · 	docusate sodium 100 mg oral tablet: Last Dose Taken:  , 1 tab(s) by PEG tube once a day as needed for  constipation  · 	Protonix 40 mg oral delayed release tablet: Last Dose Taken:  , 1 tab(s) by PEG tube once a day  · 	Oyster Shell 500 (1250 mg calcium carbonate) oral tablet: Last Dose Taken:  , 1 tab(s) by PEG tube once a day  · 	Pepcid 40 mg oral tablet: Last Dose Taken:  , 1 tab(s) by PEG tube 2 times a day  · 	senna leaf extract oral tablet: Last Dose Taken:  , 2 tab(s) orally once a day (at bedtime)  · 	petrolatum topical ointment: Last Dose Taken:  , 1 Apply topically to affected area 2 times a day  · 	ipratropium-albuterol 0.5 mg-2.5 mg/3 mL inhalation solution: Last Dose Taken:  , 3 milliliter(s) inhaled every 6 hours as needed for  shortness of breath and/or wheezing  · 	midodrine 10 mg oral tablet: Last Dose Taken:  , 1 tab(s) orally 3 times a day Please discontinue 5mg TID dose, and start 10mg TID dose  · 	ocular lubricant ophthalmic solution: Last Dose Taken:  , 1 drop(s) to each affected eye 2 times a day  · 	gabapentin 300 mg oral tablet: Last Dose Taken:  , 1 cap(s) by PEG tube every 12 hours  · 	Melatonin 3 mg oral tablet: Last Dose Taken:  , 1 tab(s) by PEG tube once a day (at bedtime)  · 	raloxifene 60 mg oral tablet: Last Dose Taken:  , 1 tab(s) by PEG tube once a day    Patient History:    Past Medical, Past Surgical, and Family History:  PAST MEDICAL HISTORY:  Cerebral palsy   Down syndrome   Hypotension on midodrine  Mild anemia   Neuropathy   Nonverbal   Osteoporosis   Seizure.     PAST SURGICAL HISTORY:  S/P debridement of R hip on 3/2/21  S/P percutaneous endoscopic gastrostomy (PEG) tube placement.      Patient received lying in bed. Awake. Limited mobility. Incontinent of urine and stool. On pressors. High risk for pressure injury development and progression.    Wound #1  Type of Wound: Stage 3 Pressure Injury  Location: Left heel  Wound bed: Pink  Wound edges: Hyperpigmented  Periwound: Intact  Wound exudate: None  Wound odor: No  Induration, erythema, warmth: No  Wound pain: No    Wound #2  Ulceration to left lateral ankle. Pink. No exudate, no odor.     Wound#3  Stage 3 Pressure Injury to left medial foot. Pink. No exudate, no odor.     Wound #4  Stage 3 Pressure Injury to right heel. Pink, no exudate, no odor.     Wound #5  Full thickness ulceration to right plantar foot medial aspect. Pink. No exudate, no odor. 
History of Present Illness:    59 yo F with hx of down syndrome, anemia, cerebral palsy, seizure, osteoporosis, hypotension on midodrine, nonverbal at baseline who was BIBEMS from [facility] for respiratory distress. Per EMS, pt was satting 74% on 4L NC. Pt was admitted 4/16-5/29 for severe sepsis and respiratory failure 2/2 recurrent CAP. During admission, blood cx grew staph capitis and VRE which cleared after daptomycin and meropenem completed on 5/26. BRIANA was negative for endocarditis. Pt also had recurrent vomiting and aspiration despite PEG tube. Further hx limited as pt is nonverbal.   In the ED, patient was given dapto and danna as per previous cultures. Patient was given 3 L of IV fluids and then started on levophed. Labs showed WBC of 17 with left shift. Lactate 3.7->1.4. ABG shows mild respiratory acidosis. BIPAP applied and patient improved.     Allergies and Intolerances:        Allergies:  	chlorhexidine containing compounds: Drug Category, Rash (Mild to Mod)    Home Medications:   * Patient Currently Takes Medications as of 31-May-2024 23:35 documented in Structured Notes  · 	Keppra 100 mg/mL oral solution: Last Dose Taken:  , 7.5 milliliter(s) orally 2 times a day by peg  · 	docusate sodium 100 mg oral tablet: Last Dose Taken:  , 1 tab(s) by PEG tube once a day as needed for  constipation  · 	Protonix 40 mg oral delayed release tablet: Last Dose Taken:  , 1 tab(s) by PEG tube once a day  · 	Oyster Shell 500 (1250 mg calcium carbonate) oral tablet: Last Dose Taken:  , 1 tab(s) by PEG tube once a day  · 	Pepcid 40 mg oral tablet: Last Dose Taken:  , 1 tab(s) by PEG tube 2 times a day  · 	senna leaf extract oral tablet: Last Dose Taken:  , 2 tab(s) orally once a day (at bedtime)  · 	petrolatum topical ointment: Last Dose Taken:  , 1 Apply topically to affected area 2 times a day  · 	ipratropium-albuterol 0.5 mg-2.5 mg/3 mL inhalation solution: Last Dose Taken:  , 3 milliliter(s) inhaled every 6 hours as needed for  shortness of breath and/or wheezing  · 	midodrine 10 mg oral tablet: Last Dose Taken:  , 1 tab(s) orally 3 times a day Please discontinue 5mg TID dose, and start 10mg TID dose  · 	ocular lubricant ophthalmic solution: Last Dose Taken:  , 1 drop(s) to each affected eye 2 times a day  · 	gabapentin 300 mg oral tablet: Last Dose Taken:  , 1 cap(s) by PEG tube every 12 hours  · 	Melatonin 3 mg oral tablet: Last Dose Taken:  , 1 tab(s) by PEG tube once a day (at bedtime)  · 	raloxifene 60 mg oral tablet: Last Dose Taken:  , 1 tab(s) by PEG tube once a day    Patient History:    Past Medical, Past Surgical, and Family History:  PAST MEDICAL HISTORY:  Cerebral palsy   Down syndrome   Hypotension on midodrine  Mild anemia   Neuropathy   Nonverbal   Osteoporosis   Seizure.     PAST SURGICAL HISTORY:  S/P debridement of R hip on 3/2/21  S/P percutaneous endoscopic gastrostomy (PEG) tube placement.      Patient received lying in bed. Awake. Limited mobility. Incontinent of urine and stool. Consulted for rash to back.    Diffuse mild erythema with areas of xerosis to back. Rash of unknown etiology.     Moisture Associated Skin Damage to bilateral buttocks

## 2024-06-06 NOTE — PROGRESS NOTE ADULT - PROBLEM SELECTOR PLAN 4
- will sign off  - reconsult PRN  ______________  Wu Jenkins MD  Palliative Medicine  [facility]   of Geriatric and Palliative Medicine  [phone]

## 2024-06-06 NOTE — PROGRESS NOTE ADULT - ASSESSMENT
59 yo F with hx of down syndrome, anemia, cerebral palsy, seizure, osteoporosis, chronic hypotension on midodrine p/w respiratory failure 2/2 presumed aspiration pneumonia      #Acute Hypoxemic Hypercapnic Respiratory Failure 2/2 possible aspiration PNA- Improved  #Septic shock- resolved  - Off pressors, on midodrine 10mg q8hrs, keep MAP>65  - Off Bipap, currently on 2L NC  - Wean off O2 as tolerated, keep SpO2 92-96%, NIV prn  - c/w duonebs q6/prn  - c/w Meropenem and Zyvox, F/u ID for final Abx duration  - c/w Aspiration precautions, frequent suctions, oral care    #Chronic Normocytic anemia  - Hb stable  - No evidence of bleeding    #Hx of Down Syndrome  #Hx of Cerebral Palsy  -  at baseline    #Hx of Seizures  - no evidence of seizures currently  - c/w Keppra      GOC as follows:  [place] [employer] decided on DNR/DNI per their documentation letter(GOC done on 2/29/2024). Palliative team reconsult noted.    #Progress Note Handoff  Pending (specify):  follow up cultures,  ID duration of Abx  Family discussion: house staff updated pt family  Disposition: dw CC. DGTF   Decision to admit the pt is based on acuity as above

## 2024-06-06 NOTE — PROGRESS NOTE ADULT - ASSESSMENT
IMPRESSION:    Acute Hypoxemic / Hypercapnic Respiratory Failure improving  Pneumonia- possibly aspiration  Normocytic Anemia  Hx of Down Syndrome  Hx of Cerebral Palsy/ Nonverbal at baseline  Hx of Seizures  Hx of OP      PLAN:    CNS: keep off sedatives, AED as OP    HEENT: Oral Care    PULMONARY: HOB @ 45, aspiration precautions BIPAP as needed, Keep spo2 92 TO 96%. chest PT. Duonebs q6hrs and PRN.    CARDIOVASCULAR: Recent echo with normal EF, goal directed fluid resuscitation,    keep map 65   midodrine 10 mg Q8 hrs     GI: GI prophylaxis.  PEG feeding     RENAL: Strict In/out, Trend CMP    INFECTIOUS DISEASE: ABX PER ID    HEMATOLOGICAL:  DVT prophylaxis.    ENDOCRINE:  Follow up FS.  Insulin protocol if needed.    MUSCULOSKELETAL: Bedrest    DGTF  POOR PROGNOSIS  DNR/I

## 2024-06-06 NOTE — ADVANCED PRACTICE NURSE CONSULT - RECOMMEDATIONS
Cleanse rash to back and MASD to bilateral buttocks with soap and water.   Pat dry, apply Dermaphor twice a day and prn for soiling.     Maintain pressure injury prevention.   Keep skin clean.   Maintain incontinence care.   Monitor skin for changes and notify provider   All other recommendations per primary MD  Case discussed with primary RN
Cleanse wounds to bilateral feet with Vashe wound cleanser (stocked in the store room).   Pat dry apply Medihoney, cover with Xeroform, wrap with Kerlix twice a day and prn for soiling.   Recommend follow up with Podiatry for further recommendations    Maintain pressure injury prevention.   Keep skin clean.   Maintain incontinence care.   Monitor skin for changes and notify provider

## 2024-06-06 NOTE — PROGRESS NOTE ADULT - PROBLEM SELECTOR PLAN 3
- patient clinically improving  - is DNR/DNI, but unlikely given clinical improvement that further withdrawal of care can occur at this time

## 2024-06-06 NOTE — PROGRESS NOTE ADULT - ASSESSMENT
58F with PMH including Down syndrome, anemia, CP, seizures, OP, hypotension on midodrine, nonverbal at baseline here from [facility] with sepsis and acute respiratory failure, started on pressors, IVF, and NIV. Patient improved on IV abx, and was weaned of NIV. Seen by palliative care in past, and CAB and MHLS advocated for DNR/DNI in past. Palliative care reconsulted for [facility].

## 2024-06-06 NOTE — CHART NOTE - NSCHARTNOTEFT_GEN_A_CORE
DOWNGRADE NOTE    Transfer from: SDU  Transfer to: Med/Surg  Approved by: Dr. Cisse    HPI:  59 yo F with hx of down syndrome, anemia, cerebral palsy, seizure, osteoporosis, hypotension on midodrine, nonverbal at baseline who was BIBEMS from [facility] on 5/31 for respiratory distress. Per EMS, pt was satting 74% on 4L NC. Pt was admitted 4/16-5/29 for severe sepsis and respiratory failure 2/2 recurrent CAP. During admission, blood cx grew staph capitis and VRE which cleared after daptomycin and meropenem completed on 5/26. BRIANA was negative for endocarditis. Pt also had recurrent vomiting and aspiration despite PEG tube. Further hx limited as pt is nonverbal.     In the ED, patient was given dapto and danna as per previous cultures. Patient was given 3 L of IV fluids and then started on levophed. Labs showed WBC of 17 with left shift. Lactate 3.7->1.4. ABG shows mild respiratory acidosis. BIPAP applied and patient improved. CT abdomen was done. Central line was placed in the right IJ for fluid resuscitation and hemodynamic stability. Admitted to ICU for AHRF and septic shock 2/2 pneumonia-likely aspiration.    ICU COURSE:  In CCU (6/1-6/4) patient was weaned off of bipap and kept off sedatives. Peg tube fed. RVP negative but was found to be MRSA nares positive. Blood culture remains negative. Per ID, continue IV meropenem 1gm q8 and IV linezolid 600mg q12hrs. La Nena Hatch,  of consumer advisory board, was asking for palliative re-eval to determine GOC for patient. Last eval was in April 2024 and circumstances might've changed. They can be reached at [phone]. As of 6/3, patient hemodynamically stable on levo at 0.06 and NIV at night and as needed. Patient was responsive to cheetah, got got some fluids and was taken off pressors. On Midodrine 10mg q8. Central line removed. pt. was downgraded to SDU.    SDU COURSE:  Off Bipap currently on  2L NC. Continued with Abx- Meropenem and Zyvox. PEG feeds. Received 500cc bolus for MAP<65. BP remained stable on Midodrine 10mgq8. Off pressors. Labs and vitals remained stable. Pt. is stable to be downgraded to floors.      FOLLOW UP:  - Wean off O2 as tolerated, keep SpO2 92-96%, NIV prn  - c/w Meropenem and Zyvox, F/u ID for final Abx duration  - c/w Aspiration precautions, frequent suctions, oral care      GENERAL: NAD, lying in bed comfortably  CHEST/LUNG: LLL lung mild crackles, no wheezing  HEART: Regular rate and rhythm  ABDOMEN: Soft, Nontender, Nondistended  EXTREMITIES: Contracted, No clubbing, cyanosis, or edema  NERVOUS SYSTEM:  Alert, non-verbal at baseline, unable to follow commands    ASSESSMENT AND PLAN:    #Acute Hypoxemic Hypercapnic Respiratory Failure 2/2 possible aspiration PNA- Improved  #Septic shock- resolved  - Off pressors, on midodrine 10mg q8hrs, keep MAP>65  - Off Bipap, currently on 2L NC  - Wean off O2 as tolerated, keep SpO2 92-96%, NIV prn  - c/w duonebs q6/prn  - c/w Meropenem and Zyvox, F/u ID for final Abx duration  - c/w Aspiration precautions, frequent suctions, oral care    #Chronic Normocytic anemia  - Hb stable  - No evidence of bleeding    #Hx of Down Syndrome  #Hx of Cerebral Palsy  -  at baseline    #Hx of Seizures  - no evidence of seizures currently  - c/w Keppra    #Misc  -DVT prophylaxis: Hep subq  -GI prophylaxis: PPI  -Diet: PEG feeds  -Code status: DNR/DNI  -Activity: bedrest  -Dispo: DGTF    GOC as follows:  [place] [employer] decided on DNR/DNI per their documentation letter(GOC done on 2/29/2024). Palliative team reconsult noted. DOWNGRADE NOTE    Transfer from: SDU  Transfer to: Med/Surg  Approved by: Dr. Cisse    HPI:  59 yo F with hx of down syndrome, anemia, cerebral palsy, seizure, osteoporosis, hypotension on midodrine, nonverbal at baseline who was BIBEMS from [facility] on 5/31 for respiratory distress. Per EMS, pt was satting 74% on 4L NC. Pt was admitted 4/16-5/29 for severe sepsis and respiratory failure 2/2 recurrent CAP. During admission, blood cx grew staph capitis and VRE which cleared after daptomycin and meropenem completed on 5/26. BRIANA was negative for endocarditis. Pt also had recurrent vomiting and aspiration despite PEG tube. Further hx limited as pt is nonverbal.     In the ED, patient was given dapto and danna as per previous cultures. Patient was given 3 L of IV fluids and then started on levophed. Labs showed WBC of 17 with left shift. Lactate 3.7->1.4. ABG shows mild respiratory acidosis. BIPAP applied and patient improved. CT abdomen was done. Central line was placed in the right IJ for fluid resuscitation and hemodynamic stability. Admitted to ICU for AHRF and septic shock 2/2 pneumonia-likely aspiration.    ICU COURSE:  In CCU (6/1-6/4) patient was weaned off of bipap and kept off sedatives. Peg tube fed. RVP negative but was found to be MRSA nares positive. Blood culture remains negative. Per ID, continue IV meropenem 1gm q8 and IV linezolid 600mg q12hrs. La Nena Hatch,  of consumer advisory board, was asking for palliative re-eval to determine GOC for patient. Last eval was in April 2024 and circumstances might've changed. They can be reached at [phone]. As of 6/3, patient hemodynamically stable on levo at 0.06 and NIV at night and as needed. Patient was responsive to cheetah, got got some fluids and was taken off pressors. On Midodrine 10mg q8. Central line removed. pt. was downgraded to SDU.    SDU COURSE:  Off Bipap currently on  2L NC. Continued with Abx- Meropenem and Zyvox. PEG feeds. Received 500cc bolus for MAP<65. BP remained stable on Midodrine 10mgq8. Off pressors. Labs and vitals remained stable. Pt. is stable to be downgraded to floors.      FOLLOW UP:  - Wean off O2 as tolerated, keep SpO2 92-96%, NIV prn  - c/w Meropenem and Zyvox, F/u ID for final Abx duration  - c/w Aspiration precautions, frequent suctions, oral care  - Avoid chlorhexidine- pt is allergic to it; F/u wound care consult for irritation dermatitis on the back from chlorhexidine wipes      GENERAL: NAD, lying in bed comfortably  CHEST/LUNG: LLL lung mild crackles, no wheezing  HEART: Regular rate and rhythm  ABDOMEN: Soft, Nontender, Nondistended  EXTREMITIES: Contracted, No clubbing, cyanosis, or edema  NERVOUS SYSTEM:  Alert, non-verbal at baseline, unable to follow commands    ASSESSMENT AND PLAN:    #Acute Hypoxemic Hypercapnic Respiratory Failure 2/2 possible aspiration PNA- Improved  #Septic shock- resolved  - Off pressors, on midodrine 10mg q8hrs, keep MAP>65  - Off Bipap, currently on 2L NC  - Wean off O2 as tolerated, keep SpO2 92-96%, NIV prn  - c/w duonebs q6/prn  - c/w Meropenem and Zyvox, F/u ID for final Abx duration  - c/w Aspiration precautions, frequent suctions, oral care    #Chronic Normocytic anemia  - Hb stable  - No evidence of bleeding    #Hx of Down Syndrome  #Hx of Cerebral Palsy  -  at baseline    #Hx of Seizures  - no evidence of seizures currently  - c/w Keppra    #Misc  -DVT prophylaxis: Hep subq  -GI prophylaxis: PPI  -Diet: PEG feeds  -Code status: DNR/DNI  -Activity: bedrest  -Dispo: DGTF    GOC as follows:  [place] [employer] decided on DNR/DNI per their documentation letter(GOC done on 2/29/2024). Palliative team reconsult noted.

## 2024-06-06 NOTE — PROGRESS NOTE ADULT - SUBJECTIVE AND OBJECTIVE BOX
HPI: 58F with PMH including Down syndrome, anemia, CP, seizures, OP, hypotension on midodrine, nonverbal at baseline here from [facility] with sepsis and acute respiratory failure, started on pressors, IVF, and NIV. Patient improved on IV abx, and was weaned of NIV. Seen by palliative care in past, and CAB and MHLS advocated for DNR/DNI in past. Palliative care reconsulted for GOC.    INTERVAL EVENTS  6/6: patient comfortable in stepdown    ADVANCE DIRECTIVES:    [ ] Full Code [ X] DNR  MOLST  [X ]  Living Will  [ ]   DECISION MAKER(s):  [ ] Health Care Proxy(s)  [ ] Surrogate(s)  [ ] Guardian           Name(s): Phone Number(s): [place] ROB    BASELINE (I)ADL(s) (prior to admission):  [place]: [ ]Total  [ ] Moderate [ ]Dependent  Palliative Performance Status Version 2:         %    [email or web address]/[email or web address]_performance_scale_[email or web address].pdf    Allergies    chlorhexidine containing compounds (Rash (Mild to Mod))    Intolerances    MEDICATIONS  (STANDING):  albuterol/ipratropium for Nebulization 3 milliLiter(s) Nebulizer every 6 hours  gabapentin 300 milliGRAM(s) Oral every 12 hours  heparin   Injectable 5000 Unit(s) SubCutaneous every 12 hours  levETIRAcetam  Solution 750 milliGRAM(s) Oral two times a day  linezolid  IVPB      linezolid  IVPB 600 milliGRAM(s) IV Intermittent every 12 hours  meropenem  IVPB 1000 milliGRAM(s) IV Intermittent every 8 hours  midodrine 10 milliGRAM(s) Oral every 8 hours  mupirocin 2% Nasal 1 Application(s) Both Nostrils every 12 hours  norepinephrine Infusion 0.05 MICROgram(s)/kG/Min (5.16 mL/Hr) IV Continuous <Continuous>  nystatin Cream 1 Application(s) Topical two times a day  pantoprazole   Suspension 40 milliGRAM(s) Oral daily  raloxifene 60 milliGRAM(s) Oral daily    MEDICATIONS  (PRN):  acetaminophen     Tablet .. 650 milliGRAM(s) Oral every 6 hours PRN Temp greater or equal to 38C (100.4F), Mild Pain (1 - 3)        PRESENT SYMPTOMS: [ X]Unable to obtain due to poor mentation   Source if other than patient:  [ ]Family   [ ]Team   [ ]All review of systems negative including pain and dyspnea unless noted below    All components of pain assessment below addressed. Blank spaces indicate that the patient did/could not complete the assessment.  Pain: [ ]yes [ ]no  QOL impact -   Location -                    Aggravating factors -  Quality -  Radiation -  Timing-  Severity (0-10 scale):  Minimal acceptable level (0-10 scale):     CPOT:    https://www.[email or web address]/getattachment/yoq34p11-9y2t-6b0x-[email or web address]/Critical-Care-Pain-Observation-Tool-(CPOT)    PAIN AD Score: 0  http://[email or web address]/painad.pdf (press ctrl +  left click to view)    Dyspnea:                           [ ]None[ ]Mild [ ]Moderate [ ]Severe     Respiratory Distress Observation Scale (RDOS): 2  A score of 0 to 2 signifies little or no respiratory distress, 3 signifies mild distress, scores 4 to 6 indicate moderate distress, and scores greater than 7 signify severe distress  [email or web address]/sites/default/files/PDFS/959291-bhejiirtgwl-qdjbjvcz-edrrzqmzrrt-qcpby.pdf    Anxiety:                             [ ]None[ ]Mild [ ]Moderate [ ]Severe   Fatigue:                             [ ]None[ ]Mild [ ]Moderate [ ]Severe   Nausea:                             [ ]None[ ]Mild [ ]Moderate [ ]Severe   Loss of appetite:              [ ]None[ ]Mild [ ]Moderate [ ]Severe   Constipation:                    [ ]None[ ]Mild [ ]Moderate [ ]Severe    Other Symptoms:      Palliative Performance Status Version 2:         %    http://[facility].org/files/news/palliative_performance_scale_ppsv2.pdf  PHYSICAL EXAM:  Vital Signs Last 24 Hrs  T(C): 36.6 (04 Jun 2024 12:00), Max: 36.9 (03 Jun 2024 16:00)  T(F): 97.9 (04 Jun 2024 12:00), Max: 98.5 (03 Jun 2024 16:00)  HR: 106 (04 Jun 2024 14:30) (65 - 126)  BP: 100/58 (04 Jun 2024 14:30) (77/50 - 118/68)  BP(mean): 75 (04 Jun 2024 14:30) (59 - 89)  RR: 27 (04 Jun 2024 14:30) (16 - 27)  SpO2: 95% (04 Jun 2024 14:30) (92% - 100%)    Parameters below as of 04 Jun 2024 14:30  Patient On (Oxygen Delivery Method): nasal cannula  O2 Flow (L/min): 2   I&O's Summary    03 Jun 2024 07:01  -  04 Jun 2024 07:00  --------------------------------------------------------  IN: 2875.6 mL / OUT: 2800 mL / NET: 75.6 mL    04 Jun 2024 07:01  -  04 Jun 2024 14:58  --------------------------------------------------------  IN: 1008.3 mL / OUT: 600 mL / NET: 408.3 mL        GENERAL:  [X ] No acute distress [ ]Lethargic  [ ]Unarousable  [ ]Verbal  [ ]Non-Verbal [ ]Cachexia    BEHAVIORAL/PSYCH:  [ ]Alert and Oriented x  [ ] Anxiety [ ] Delirium [ ] Agitation [X ] Calm   EYES: [ X] No scleral icterus [ ] Scleral icterus [ ] Closed  ENMT:  [ ]Dry mouth  [ ]No external oral lesions [ X] No external ear or nose lesions  CARDIOVASCULAR:  [ ]Regular [ ]Irregular [ ]Tachy [ ]Not Tachy  [ ]Raheem [ ] Edema [ ] No edema  PULMONARY:  [ ]Tachypnea  [ ]Audible excessive secretions [X ] No labored breathing [ ] labored breathing  GASTROINTESTINAL: [ ]Soft  [ ]Distended  [ X]Not distended [ ]Non tender [ ]Tender  MUSCULOSKELETAL: [ ]No clubbing [ ] clubbing  [ X] No cyanosis [ ] cyanosis  NEUROLOGIC: [ ]No focal deficits  [ ]Follows commands  [ ]Does not follow commands  [ X]Cognitive impairment  [ ]Dysphagia  [ ]Dysarthria  [ ]Paresis   SKIN: [ ] Jaundiced [X ] Non-jaundiced [ ]Rash [ ]No Rash [ ] Warm [ ] Dry  MISC/LINES: [ ] ET tube [ ] Trach [ ]NGT/OGT [ ]PEG [ ]Madsen    CRITICAL CARE:  [ ] Shock Present  [ ]Septic [ ]Cardiogenic [ ]Neurologic [ ]Hypovolemic  [ ]  Vasopressors [ ]  Inotropes   [ ]Respiratory failure present [ ]Mechanical ventilation [ ]Non-invasive ventilatory support [ ]High flow  [ ]Acute  [ ]Chronic [ ]Hypoxic  [ ]Hypercarbic [ ]Other  [ ]Other organ failure     LABS: reviewed by me, notable for: WNL                                   8.8    7.91  )-----------( 425      ( 06 Jun 2024 04:27 )             27.1     06-06    137  |  99  |  9<L>  ----------------------------<  93  4.8   |  27  |  0.5<L>    Ca    8.3<L>      06 Jun 2024 04:27  Phos  3.7     06-06  Mg     2.5     06-06          RADIOLOGY & ADDITIONAL STUDIES:     PROTEIN CALORIE MALNUTRITION PRESENT: [ ]mild [ ]moderate [ ]severe [ ]underweight [ ]morbid obesity  https://www.andeal.org/vault/5510/web/files/ONC/Table_Clinical%20Characteristics%20to%20Document%20Malnutrition-White%20JV%20et%20al%202012.pdf    Height (cm): 134 (05-31-24 @ 18:19), 134 (05-14-24 @ 02:54), 134 (04-11-24 @ 13:16)  Weight (kg): 40 (05-31-24 @ 19:53), 42.7 (05-14-24 @ 02:54), 52 (04-02-24 @ 12:18)  BMI (kg/m2): 22.3 (05-31-24 @ 19:53), 23.8 (05-31-24 @ 18:19), 23.8 (05-14-24 @ 02:54)    [ ]PPSV2 < or = to 30% [ ]significant weight loss  [ ]poor nutritional intake  [ ]anasarca      [ ]Artificial Nutrition          Palliative Care Spiritual/Emotional Screening Tool Question  Severity (0-4):                    OR                    [X ] Unable to determine/NA  Score of 2 or greater indicates recommendation of Chaplaincy referral  Chaplaincy Referral: [ ] Yes [ ] Refused [ ] Following     Caregiver [place]:  [ ] Yes [ ] No    OR    [x ] Unable to determine. Will assess at later time if appropriate.  Social Work Referral [ ]  Patient and Family Centered Care Referral [ ]    Anticipatory Grief Present: [ ] Yes [ ] No    OR     [ x] Unable to determine. Will assess at later time if appropriate.  Social Work Referral [ ]  Patient and Family Centered Care Referral [ ]    REFERRALS:   [ ]Chaplaincy  [ ]Hospice  [ ]Child Life  [ ]Social Work  [ ]Case management [ ]Holistic Therapy     Palliative care education provided to patient and/or family    Goals of Care Document:     ______________  Wu Jenkins MD  Palliative Medicine  [facility]   of Geriatric and Palliative Medicine  [phone]

## 2024-06-06 NOTE — PROGRESS NOTE ADULT - SUBJECTIVE AND OBJECTIVE BOX
Over Night Events: events noted, on NC, afebrile    PHYSICAL EXAM    ICU Vital Signs Last 24 Hrs  T(C): 37 (06 Jun 2024 00:00), Max: 37 (06 Jun 2024 00:00)  T(F): 98.6 (06 Jun 2024 00:00), Max: 98.6 (06 Jun 2024 00:00)  HR: 110 (06 Jun 2024 00:00) (97 - 110)  BP: 109/74 (06 Jun 2024 00:00) (87/55 - 109/74)  BP(mean): 88 (06 Jun 2024 00:00) (64 - 88)  RR: 18 (05 Jun 2024 08:00) (18 - 18)  SpO2: 98% (06 Jun 2024 00:00) (96% - 98%)    O2 Parameters below as of 05 Jun 2024 08:00  Patient On (Oxygen Delivery Method): nasal cannula  O2 Flow (L/min): 2          General: ill looking  Lungs: Bilateral rhonchi  Cardiovascular: Regular   Abdomen: Soft, Positive BS  Extremities: No clubbing   Neurological: Non focal       06-04-24 @ 07:01  -  06-05-24 @ 07:00  --------------------------------------------------------  IN:    Enteral Tube Flush: 60 mL    IV PiggyBack: 50 mL    IV PiggyBack: 500 mL    IV PiggyBack: 300 mL    Jevity 1.2: 210 mL    Lactated Ringers Bolus: 250 mL    Norepinephrine: 8.3 mL  Total IN: 1378.3 mL    OUT:    Voided (mL): 800 mL  Total OUT: 800 mL    Total NET: 578.3 mL      06-05-24 @ 07:01  -  06-06-24 @ 06:12  --------------------------------------------------------  IN:    Free Water: 450 mL    Jevity 1.2: 525 mL  Total IN: 975 mL    OUT:    Voided (mL): 1000 mL  Total OUT: 1000 mL    Total NET: -25 mL          LABS:                          8.8    7.91  )-----------( 425      ( 06 Jun 2024 04:27 )             27.1                                               06-06    137  |  99  |  9<L>  ----------------------------<  93  4.8   |  27  |  0.5<L>    Ca    8.3<L>      06 Jun 2024 04:27  Phos  3.7     06-06  Mg     2.5     06-06    TPro  6.2  /  Alb  2.6<L>  /  TBili  <0.2  /  DBili  x   /  AST  13  /  ALT  9   /  AlkPhos  94  06-06                                             Urinalysis Basic - ( 06 Jun 2024 04:27 )    Color: x / Appearance: x / SG: x / pH: x  Gluc: 93 mg/dL / Ketone: x  / Bili: x / Urobili: x   Blood: x / Protein: x / Nitrite: x   Leuk Esterase: x / RBC: x / WBC x   Sq Epi: x / Non Sq Epi: x / Bacteria: x                                                  LIVER FUNCTIONS - ( 06 Jun 2024 04:27 )  Alb: 2.6 g/dL / Pro: 6.2 g/dL / ALK PHOS: 94 U/L / ALT: 9 U/L / AST: 13 U/L / GGT: x                                                                                                                                       MEDICATIONS  (STANDING):  albuterol/ipratropium for Nebulization 3 milliLiter(s) Nebulizer every 6 hours  gabapentin 300 milliGRAM(s) Oral every 12 hours  heparin   Injectable 5000 Unit(s) SubCutaneous every 12 hours  levETIRAcetam  Solution 750 milliGRAM(s) Oral two times a day  linezolid  IVPB      linezolid  IVPB 600 milliGRAM(s) IV Intermittent every 12 hours  meropenem  IVPB 1000 milliGRAM(s) IV Intermittent every 8 hours  midodrine 10 milliGRAM(s) Oral every 8 hours  mupirocin 2% Nasal 1 Application(s) Both Nostrils every 12 hours  norepinephrine Infusion 0.05 MICROgram(s)/kG/Min (5.16 mL/Hr) IV Continuous <Continuous>  nystatin Cream 1 Application(s) Topical two times a day  pantoprazole   Suspension 40 milliGRAM(s) Oral daily  raloxifene 60 milliGRAM(s) Oral daily    MEDICATIONS  (PRN):  acetaminophen     Tablet .. 650 milliGRAM(s) Oral every 6 hours PRN Temp greater or equal to 38C (100.4F), Mild Pain (1 - 3)

## 2024-06-06 NOTE — PROGRESS NOTE ADULT - SUBJECTIVE AND OBJECTIVE BOX
Patient is a 58y old  Female who presents with a chief complaint of Hypoxia and Tachypnea (06-06-24)      Pt seen and examined at bedside. Non verbal. No ON events.           PAST MEDICAL & SURGICAL HISTORY:  Down syndrome    Osteoporosis    Mild anemia    Neuropathy    Cerebral palsy    Seizure    Nonverbal    Hypotension  on midodrine    S/P debridement  of R hip on 3/2/21    S/P percutaneous endoscopic gastrostomy (PEG) tube placement        VITAL SIGNS (Last 24 hrs):  T(C): 36.4 (06-06-24 @ 08:00), Max: 37 (06-06-24 @ 00:00)  HR: 116 (06-06-24 @ 08:00) (97 - 116)  BP: 112/82 (06-06-24 @ 08:00) (87/55 - 112/82)  RR: --  SpO2: 91% (06-06-24 @ 08:00) (91% - 98%)  Wt(kg): --  Daily     Daily     I&O's Summary    05 Jun 2024 07:01  -  06 Jun 2024 07:00  --------------------------------------------------------  IN: 1370 mL / OUT: 1850 mL / NET: -480 mL        PHYSICAL EXAM:  GENERAL: NAD  HEAD:  Atraumatic, Normocephalic  EYES: EOMI, PERRLA, conjunctiva and sclera clear  NECK: Supple, No JVD  CHEST/LUNG: Clear to auscultation bilaterally; No wheeze  HEART: Regular rate and rhythm; No murmurs, rubs, or gallops  ABDOMEN: Soft, Nontender, Nondistended; Bowel sounds present  EXTREMITIES:  2+ Peripheral Pulses, No clubbing, cyanosis, or edema  PSYCH: intellectual disability   NEUROLOGY: unable to assess      Labs Reviewed  Spoke to patient in regards to abnormal labs.    CBC Full  -  ( 06 Jun 2024 04:27 )  WBC Count : 7.91 K/uL  Hemoglobin : 8.8 g/dL  Hematocrit : 27.1 %  Platelet Count - Automated : 425 K/uL  Mean Cell Volume : 90.9 fL  Mean Cell Hemoglobin : 29.5 pg  Mean Cell Hemoglobin Concentration : 32.5 g/dL  Auto Neutrophil # : 5.12 K/uL  Auto Lymphocyte # : 1.73 K/uL  Auto Monocyte # : 0.43 K/uL  Auto Eosinophil # : 0.53 K/uL  Auto Basophil # : 0.07 K/uL  Auto Neutrophil % : 64.7 %  Auto Lymphocyte % : 21.9 %  Auto Monocyte % : 5.4 %  Auto Eosinophil % : 6.7 %  Auto Basophil % : 0.9 %    BMP:    06-06 @ 04:27    Blood Urea Nitrogen - 9  Calcium - 8.3  Carbond Dioxide - 27  Chloride - 99  Creatinine - 0.5  Glucose - 93  Potassium - 4.8  Sodium - 137      Hemoglobin A1c -     Urine Culture:  06-02 @ 00:05 Urine culture: --    Culture Results:   <10,000 CFU/mL Normal Urogenital Mili  Method Type: --  Organism: --  Organism Identification: --  Specimen Source: Catheterized None        COVID Labs  CRP:  31.9 (05-21-24)    Procalcitonin: 0.28 ng/mL (06-01-24 @ 11:14)  Procalcitonin: 0.62 ng/mL (06-01-24 @ 04:17)    D-Dimer:      Fungitell: 86 pg/mL (05-03-24 @ 18:00)  Fungitell: 53 pg/mL (04-25-24 @ 21:33)  Fungitell: 53 pg/mL (04-24-24 @ 05:57)  Fungitell: 81 pg/mL (03-13-24 @ 17:24)  Fungitell: 73 pg/mL (02-23-24 @ 18:19)  Fungitell: 76 pg/mL (02-19-24 @ 16:00)  Fungitell: 63 pg/mL (02-06-24 @ 11:27)  Fungitell: 65 pg/mL (02-06-24 @ 04:43)  Fungitell: 325 pg/mL (01-20-24 @ 21:23)    Fungitell: 86 pg/mL (05-03-24 @ 18:00)  Fungitell: 53 pg/mL (04-25-24 @ 21:33)  Fungitell: 53 pg/mL (04-24-24 @ 05:57)  Fungitell: 81 pg/mL (03-13-24 @ 17:24)  Fungitell: 73 pg/mL (02-23-24 @ 18:19)  Fungitell: 76 pg/mL (02-19-24 @ 16:00)  Fungitell: 63 pg/mL (02-06-24 @ 11:27)  Fungitell: 65 pg/mL (02-06-24 @ 04:43)  Fungitell: 325 pg/mL (01-20-24 @ 21:23)      Imaging reviewed independently and reviewed read    < from: Xray Chest 1 View- PORTABLE-Routine (06.06.24 @ 06:47) >    Impression:    Unchanged appearance of the thorax.    < end of copied text >      MEDICATIONS  (STANDING):  albuterol/ipratropium for Nebulization 3 milliLiter(s) Nebulizer every 6 hours  gabapentin 300 milliGRAM(s) Oral every 12 hours  heparin   Injectable 5000 Unit(s) SubCutaneous every 12 hours  levETIRAcetam  Solution 750 milliGRAM(s) Oral two times a day  linezolid  IVPB      linezolid  IVPB 600 milliGRAM(s) IV Intermittent every 12 hours  meropenem  IVPB 1000 milliGRAM(s) IV Intermittent every 8 hours  midodrine 10 milliGRAM(s) Oral every 8 hours  mupirocin 2% Nasal 1 Application(s) Both Nostrils every 12 hours  norepinephrine Infusion 0.05 MICROgram(s)/kG/Min (5.16 mL/Hr) IV Continuous <Continuous>  nystatin Cream 1 Application(s) Topical two times a day  pantoprazole   Suspension 40 milliGRAM(s) Oral daily  raloxifene 60 milliGRAM(s) Oral daily    MEDICATIONS  (PRN):  acetaminophen     Tablet .. 650 milliGRAM(s) Oral every 6 hours PRN Temp greater or equal to 38C (100.4F), Mild Pain (1 - 3)

## 2024-06-07 LAB
ALBUMIN SERPL ELPH-MCNC: 3.1 G/DL — LOW (ref 3.5–5.2)
ALP SERPL-CCNC: 94 U/L — SIGNIFICANT CHANGE UP (ref 30–115)
ALT FLD-CCNC: 9 U/L — SIGNIFICANT CHANGE UP (ref 0–41)
ANION GAP SERPL CALC-SCNC: 12 MMOL/L — SIGNIFICANT CHANGE UP (ref 7–14)
AST SERPL-CCNC: 16 U/L — SIGNIFICANT CHANGE UP (ref 0–41)
BASOPHILS # BLD AUTO: 0.08 K/UL — SIGNIFICANT CHANGE UP (ref 0–0.2)
BASOPHILS NFR BLD AUTO: 1 % — SIGNIFICANT CHANGE UP (ref 0–1)
BILIRUB SERPL-MCNC: <0.2 MG/DL — SIGNIFICANT CHANGE UP (ref 0.2–1.2)
BUN SERPL-MCNC: 9 MG/DL — LOW (ref 10–20)
CALCIUM SERPL-MCNC: 9 MG/DL — SIGNIFICANT CHANGE UP (ref 8.4–10.5)
CHLORIDE SERPL-SCNC: 98 MMOL/L — SIGNIFICANT CHANGE UP (ref 98–110)
CO2 SERPL-SCNC: 29 MMOL/L — SIGNIFICANT CHANGE UP (ref 17–32)
CREAT SERPL-MCNC: 0.5 MG/DL — LOW (ref 0.7–1.5)
EGFR: 109 ML/MIN/1.73M2 — SIGNIFICANT CHANGE UP
EOSINOPHIL # BLD AUTO: 0.45 K/UL — SIGNIFICANT CHANGE UP (ref 0–0.7)
EOSINOPHIL NFR BLD AUTO: 5.8 % — SIGNIFICANT CHANGE UP (ref 0–8)
GLUCOSE SERPL-MCNC: 88 MG/DL — SIGNIFICANT CHANGE UP (ref 70–99)
HCT VFR BLD CALC: 32.1 % — LOW (ref 37–47)
HGB BLD-MCNC: 10.2 G/DL — LOW (ref 12–16)
IMM GRANULOCYTES NFR BLD AUTO: 0.4 % — HIGH (ref 0.1–0.3)
LYMPHOCYTES # BLD AUTO: 1.98 K/UL — SIGNIFICANT CHANGE UP (ref 1.2–3.4)
LYMPHOCYTES # BLD AUTO: 25.4 % — SIGNIFICANT CHANGE UP (ref 20.5–51.1)
MAGNESIUM SERPL-MCNC: 2.5 MG/DL — HIGH (ref 1.8–2.4)
MCHC RBC-ENTMCNC: 29.4 PG — SIGNIFICANT CHANGE UP (ref 27–31)
MCHC RBC-ENTMCNC: 31.8 G/DL — LOW (ref 32–37)
MCV RBC AUTO: 92.5 FL — SIGNIFICANT CHANGE UP (ref 81–99)
MONOCYTES # BLD AUTO: 0.6 K/UL — SIGNIFICANT CHANGE UP (ref 0.1–0.6)
MONOCYTES NFR BLD AUTO: 7.7 % — SIGNIFICANT CHANGE UP (ref 1.7–9.3)
NEUTROPHILS # BLD AUTO: 4.67 K/UL — SIGNIFICANT CHANGE UP (ref 1.4–6.5)
NEUTROPHILS NFR BLD AUTO: 59.7 % — SIGNIFICANT CHANGE UP (ref 42.2–75.2)
NRBC # BLD: 0 /100 WBCS — SIGNIFICANT CHANGE UP (ref 0–0)
PHOSPHATE SERPL-MCNC: 3.9 MG/DL — SIGNIFICANT CHANGE UP (ref 2.1–4.9)
PLATELET # BLD AUTO: 513 K/UL — HIGH (ref 130–400)
PMV BLD: 9.6 FL — SIGNIFICANT CHANGE UP (ref 7.4–10.4)
POTASSIUM SERPL-MCNC: 5.2 MMOL/L — HIGH (ref 3.5–5)
POTASSIUM SERPL-SCNC: 5.2 MMOL/L — HIGH (ref 3.5–5)
PROT SERPL-MCNC: 6.9 G/DL — SIGNIFICANT CHANGE UP (ref 6–8)
RBC # BLD: 3.47 M/UL — LOW (ref 4.2–5.4)
RBC # FLD: 19.3 % — HIGH (ref 11.5–14.5)
SODIUM SERPL-SCNC: 139 MMOL/L — SIGNIFICANT CHANGE UP (ref 135–146)
WBC # BLD: 7.81 K/UL — SIGNIFICANT CHANGE UP (ref 4.8–10.8)
WBC # FLD AUTO: 7.81 K/UL — SIGNIFICANT CHANGE UP (ref 4.8–10.8)

## 2024-06-07 PROCEDURE — 71045 X-RAY EXAM CHEST 1 VIEW: CPT | Mod: 26

## 2024-06-07 PROCEDURE — 99232 SBSQ HOSP IP/OBS MODERATE 35: CPT

## 2024-06-07 RX ADMIN — RALOXIFENE HYDROCHLORIDE 60 MILLIGRAM(S): 60 TABLET, COATED ORAL at 11:28

## 2024-06-07 RX ADMIN — Medication 3 MILLILITER(S): at 08:49

## 2024-06-07 RX ADMIN — MEROPENEM 100 MILLIGRAM(S): 1 INJECTION INTRAVENOUS at 21:02

## 2024-06-07 RX ADMIN — MIDODRINE HYDROCHLORIDE 10 MILLIGRAM(S): 2.5 TABLET ORAL at 21:07

## 2024-06-07 RX ADMIN — GABAPENTIN 300 MILLIGRAM(S): 400 CAPSULE ORAL at 05:56

## 2024-06-07 RX ADMIN — NYSTATIN CREAM 1 APPLICATION(S): 100000 CREAM TOPICAL at 05:57

## 2024-06-07 RX ADMIN — LINEZOLID 300 MILLIGRAM(S): 600 INJECTION, SOLUTION INTRAVENOUS at 07:36

## 2024-06-07 RX ADMIN — MIDODRINE HYDROCHLORIDE 10 MILLIGRAM(S): 2.5 TABLET ORAL at 05:57

## 2024-06-07 RX ADMIN — PANTOPRAZOLE SODIUM 40 MILLIGRAM(S): 20 TABLET, DELAYED RELEASE ORAL at 11:26

## 2024-06-07 RX ADMIN — HEPARIN SODIUM 5000 UNIT(S): 5000 INJECTION INTRAVENOUS; SUBCUTANEOUS at 17:28

## 2024-06-07 RX ADMIN — GABAPENTIN 300 MILLIGRAM(S): 400 CAPSULE ORAL at 17:28

## 2024-06-07 RX ADMIN — MEROPENEM 100 MILLIGRAM(S): 1 INJECTION INTRAVENOUS at 14:01

## 2024-06-07 RX ADMIN — MEROPENEM 100 MILLIGRAM(S): 1 INJECTION INTRAVENOUS at 05:56

## 2024-06-07 RX ADMIN — MIDODRINE HYDROCHLORIDE 10 MILLIGRAM(S): 2.5 TABLET ORAL at 14:02

## 2024-06-07 RX ADMIN — Medication 3 MILLILITER(S): at 19:49

## 2024-06-07 RX ADMIN — LEVETIRACETAM 750 MILLIGRAM(S): 250 TABLET, FILM COATED ORAL at 17:28

## 2024-06-07 RX ADMIN — NYSTATIN CREAM 1 APPLICATION(S): 100000 CREAM TOPICAL at 17:53

## 2024-06-07 RX ADMIN — LEVETIRACETAM 750 MILLIGRAM(S): 250 TABLET, FILM COATED ORAL at 05:56

## 2024-06-07 RX ADMIN — LINEZOLID 300 MILLIGRAM(S): 600 INJECTION, SOLUTION INTRAVENOUS at 17:32

## 2024-06-07 RX ADMIN — HEPARIN SODIUM 5000 UNIT(S): 5000 INJECTION INTRAVENOUS; SUBCUTANEOUS at 05:56

## 2024-06-07 NOTE — PROGRESS NOTE ADULT - SUBJECTIVE AND OBJECTIVE BOX
Patient is a 58y old  Female who presents with a chief complaint of Hypoxia and Tachypnea (06-06-24)      Pt seen this am   -with complicated hospital stay  -ID follow up   -on IV abx     Vital Signs Last 24 Hrs  T(C): 36.8 (07 Jun 2024 11:55), Max: 36.9 (06 Jun 2024 19:29)  T(F): 98.2 (07 Jun 2024 11:55), Max: 98.4 (06 Jun 2024 19:29)  HR: 76 (07 Jun 2024 11:55) (76 - 91)  BP: 103/70 (07 Jun 2024 11:55) (90/55 - 136/78)  BP(mean): 72 (07 Jun 2024 05:28) (69 - 97)  RR: 18 (07 Jun 2024 11:55) (16 - 20)  SpO2: 95% (07 Jun 2024 11:55) (95% - 99%)    Parameters below as of 06 Jun 2024 19:29  Patient On (Oxygen Delivery Method): nasal cannula  O2 Flow (L/min): 4      PHYSICAL EXAM:  GENERAL: Not in distress - comfortable in bed   CHEST/LUNG: decreased BS at bases   HEART: Regular rate and rhythm  ABDOMEN: Soft abdomen   PSYCH: intellectual disability   NEUROLOGY: unable to assess                            10.2   7.81  )-----------( 513      ( 07 Jun 2024 07:13 )             32.1   06-07    139  |  98  |  9<L>  ----------------------------<  88  5.2<H>   |  29  |  0.5<L>    Ca    9.0      07 Jun 2024 07:13  Phos  3.9     06-07  Mg     2.5     06-07    TPro  6.9  /  Alb  3.1<L>  /  TBili  <0.2  /  DBili  x   /  AST  16  /  ALT  9   /  AlkPhos  94  06-07    MEDICATIONS  (STANDING):  albuterol/ipratropium for Nebulization 3 milliLiter(s) Nebulizer every 6 hours  gabapentin 300 milliGRAM(s) Oral every 12 hours  heparin   Injectable 5000 Unit(s) SubCutaneous every 12 hours  levETIRAcetam  Solution 750 milliGRAM(s) Oral two times a day  linezolid  IVPB      linezolid  IVPB 600 milliGRAM(s) IV Intermittent every 12 hours  meropenem  IVPB 1000 milliGRAM(s) IV Intermittent every 8 hours  midodrine 10 milliGRAM(s) Oral every 8 hours  nystatin Cream 1 Application(s) Topical two times a day  pantoprazole   Suspension 40 milliGRAM(s) Oral daily  raloxifene 60 milliGRAM(s) Oral daily    MEDICATIONS  (PRN):  acetaminophen     Tablet .. 650 milliGRAM(s) Oral every 6 hours PRN Temp greater or equal to 38C (100.4F), Mild Pain (1 - 3)

## 2024-06-07 NOTE — PROGRESS NOTE ADULT - ASSESSMENT
59 yo F with hx of down syndrome, anemia, cerebral palsy, seizure, osteoporosis, chronic hypotension on midodrine p/w respiratory failure 2/2 presumed aspiration pneumonia      #Acute Hypoxemic Hypercapnic Respiratory Failure 2/2 possible aspiration PNA- Improved  #Septic shock- resolved  - Off pressors, on midodrine 10mg q8hrs, keep MAP>65  - Off Bipap, currently on 2L NC  - Wean off O2 as tolerated, keep SpO2 92-96%, NIV prn  - c/w duonebs q6/prn  - c/w IV Meropenem and Zyvox  - ID follow up for abx choice and duration for d/c planning   - c/w Aspiration precautions, frequent suctions, oral care    #Chronic Normocytic anemia  - Hb stable  - No evidence of bleeding    #Hx of Down Syndrome  #Hx of Cerebral Palsy  -  at baseline    #Hx of Seizures  - c/w Keppra      DISPO: d/c planning to group home early next week - not discharge ready today - on IV abx - discussed with CM in rounds   -updated Group HHA at bedside       Attending Physician Dr. Brianna James # [id number]

## 2024-06-07 NOTE — PROGRESS NOTE ADULT - ASSESSMENT
FOLLOW UP:      ASSESSMENT AND PLAN:    #Acute Hypoxemic Hypercapnic Respiratory Failure 2/2 possible aspiration PNA- Improved  #Septic shock- resolved  - Off pressors, on midodrine 10mg q8hrs, keep MAP>65  - Off Bipap, currently on 2L NC  - Wean off O2 as tolerated, keep SpO2 92-96%, NIV prn  - c/w duonebs q6/prn  - c/w Meropenem and Zyvox, on day 7/7 of abx per ID  - c/w Aspiration precautions, frequent suctions, oral care    #Contact dermatitis back   -Avoid chlorhexidine- pt is allergic to it; F/u wound care consult for irritation dermatitis on the back from chlorhexidine wipes  -wound care recs appreciated; Cleanse rash to back and MASD to bilateral buttocks with soap and water.   Pat dry, apply Dermaphor twice a day and prn for soiling.     #Chronic Normocytic anemia  - Hb stable  - No evidence of bleeding    #Hx of Down Syndrome  #Hx of Cerebral Palsy  -  at baseline    #Hx of Seizures  - no evidence of seizures currently  - c/w Keppra    #Misc  -DVT prophylaxis: Hep subq  -GI prophylaxis: PPI  -Diet: PEG feeds  -Code status: DNR/DNI  -Activity: bedrest  -Dispo: DGTF    GOC as follows:  [employer] [place] [employer] decided on DNR/DNI per their documentation letter(GOC done on 2/29/2024). Palliative team reconsult noted.

## 2024-06-07 NOTE — PROGRESS NOTE ADULT - SUBJECTIVE AND OBJECTIVE BOX
TEA ECHAVARRIA 58y Female  MRN#: [id number]   Hospital Day: 7d    SUBJECTIVE  Patient is a 58y old Female who presents with a chief complaint of Hypoxia and Tachypnea (06 Jun 2024 13:54)  Currently admitted to medicine with the primary diagnosis of Septic shock      INTERVAL HPI AND OVERNIGHT EVENTS:  Patient was examined and seen at bedside. This morning she is resting comfortably in bed. No issues or overnight events. Breathing comfortably and saturating well on 4 L/min NC.    OBJECTIVE  PAST MEDICAL & SURGICAL HISTORY  Down syndrome    Osteoporosis    Mild anemia    Neuropathy    Cerebral palsy    Seizure    Nonverbal    Hypotension  on midodrine    S/P debridement  of R hip on 3/2/21    S/P percutaneous endoscopic gastrostomy (PEG) tube placement      ALLERGIES:  chlorhexidine containing compounds (Rash (Mild to Mod))    MEDICATIONS:  STANDING MEDICATIONS  albuterol/ipratropium for Nebulization 3 milliLiter(s) Nebulizer every 6 hours  gabapentin 300 milliGRAM(s) Oral every 12 hours  heparin   Injectable 5000 Unit(s) SubCutaneous every 12 hours  levETIRAcetam  Solution 750 milliGRAM(s) Oral two times a day  linezolid  IVPB 600 milliGRAM(s) IV Intermittent every 12 hours  linezolid  IVPB      meropenem  IVPB 1000 milliGRAM(s) IV Intermittent every 8 hours  midodrine 10 milliGRAM(s) Oral every 8 hours  norepinephrine Infusion 0.05 MICROgram(s)/kG/Min IV Continuous <Continuous>  nystatin Cream 1 Application(s) Topical two times a day  pantoprazole   Suspension 40 milliGRAM(s) Oral daily  raloxifene 60 milliGRAM(s) Oral daily    PRN MEDICATIONS  acetaminophen     Tablet .. 650 milliGRAM(s) Oral every 6 hours PRN      VITAL SIGNS: Last 24 Hours  T(C): 36.7 (07 Jun 2024 05:28), Max: 36.9 (06 Jun 2024 19:29)  T(F): 98 (07 Jun 2024 05:28), Max: 98.4 (06 Jun 2024 19:29)  HR: 80 (07 Jun 2024 05:28) (80 - 100)  BP: 98/59 (07 Jun 2024 05:28) (90/55 - 136/78)  BP(mean): 72 (07 Jun 2024 05:28) (69 - 97)  RR: 20 (07 Jun 2024 05:28) (16 - 20)  SpO2: 96% (06 Jun 2024 19:29) (96% - 99%)    LABS:                        10.2   7.81  )-----------( 513      ( 07 Jun 2024 07:13 )             32.1     06-07    139  |  98  |  9<L>  ----------------------------<  88  5.2<H>   |  29  |  0.5<L>    Ca    9.0      07 Jun 2024 07:13  Phos  3.9     06-07  Mg     2.5     06-07    TPro  6.9  /  Alb  3.1<L>  /  TBili  <0.2  /  DBili  x   /  AST  16  /  ALT  9   /  AlkPhos  94  06-07      Urinalysis Basic - ( 07 Jun 2024 07:13 )    Color: x / Appearance: x / SG: x / pH: x  Gluc: 88 mg/dL / Ketone: x  / Bili: x / Urobili: x   Blood: x / Protein: x / Nitrite: x   Leuk Esterase: x / RBC: x / WBC x   Sq Epi: x / Non Sq Epi: x / Bacteria: x                RADIOLOGY:      PHYSICAL EXAM:  CONSTITUTIONAL: No acute distress, contorted female w/ Down syndrome/Cerebral palsy; non-verbal AOx0  HEAD: Atraumatic, normocephalic  EYES:  conjunctiva and sclera clear  ENT: no JVD; moist mucous membranes  PULMONARY: Clear to auscultation bilaterally; no wheezes, rales, or rhonchi  CARDIOVASCULAR: Regular rate and rhythm; no murmurs, rubs, or gallops  GASTROINTESTINAL: Soft, non-tender, non-distended; bowel sounds present  MUSCULOSKELETAL: 2+ peripheral pulses; no clubbing, no cyanosis, no edema  NEUROLOGY: non-focal  SKIN: macular rash of back s/p chlorhexidine exposure; stage 3 bandaged ulcers of b/l feet

## 2024-06-08 LAB
ALBUMIN SERPL ELPH-MCNC: 2.9 G/DL — LOW (ref 3.5–5.2)
ALP SERPL-CCNC: 103 U/L — SIGNIFICANT CHANGE UP (ref 30–115)
ALT FLD-CCNC: 8 U/L — SIGNIFICANT CHANGE UP (ref 0–41)
ANION GAP SERPL CALC-SCNC: 9 MMOL/L — SIGNIFICANT CHANGE UP (ref 7–14)
AST SERPL-CCNC: 13 U/L — SIGNIFICANT CHANGE UP (ref 0–41)
BASOPHILS # BLD AUTO: 0.09 K/UL — SIGNIFICANT CHANGE UP (ref 0–0.2)
BASOPHILS NFR BLD AUTO: 1.2 % — HIGH (ref 0–1)
BILIRUB SERPL-MCNC: <0.2 MG/DL — SIGNIFICANT CHANGE UP (ref 0.2–1.2)
BUN SERPL-MCNC: 11 MG/DL — SIGNIFICANT CHANGE UP (ref 10–20)
CALCIUM SERPL-MCNC: 8.7 MG/DL — SIGNIFICANT CHANGE UP (ref 8.4–10.5)
CHLORIDE SERPL-SCNC: 98 MMOL/L — SIGNIFICANT CHANGE UP (ref 98–110)
CO2 SERPL-SCNC: 30 MMOL/L — SIGNIFICANT CHANGE UP (ref 17–32)
CREAT SERPL-MCNC: 0.5 MG/DL — LOW (ref 0.7–1.5)
EGFR: 109 ML/MIN/1.73M2 — SIGNIFICANT CHANGE UP
EOSINOPHIL # BLD AUTO: 0.33 K/UL — SIGNIFICANT CHANGE UP (ref 0–0.7)
EOSINOPHIL NFR BLD AUTO: 4.3 % — SIGNIFICANT CHANGE UP (ref 0–8)
GLUCOSE SERPL-MCNC: 120 MG/DL — HIGH (ref 70–99)
HCT VFR BLD CALC: 32.3 % — LOW (ref 37–47)
HGB BLD-MCNC: 9.9 G/DL — LOW (ref 12–16)
IMM GRANULOCYTES NFR BLD AUTO: 0.4 % — HIGH (ref 0.1–0.3)
LYMPHOCYTES # BLD AUTO: 1.94 K/UL — SIGNIFICANT CHANGE UP (ref 1.2–3.4)
LYMPHOCYTES # BLD AUTO: 25.4 % — SIGNIFICANT CHANGE UP (ref 20.5–51.1)
MAGNESIUM SERPL-MCNC: 2.4 MG/DL — SIGNIFICANT CHANGE UP (ref 1.8–2.4)
MCHC RBC-ENTMCNC: 29.2 PG — SIGNIFICANT CHANGE UP (ref 27–31)
MCHC RBC-ENTMCNC: 30.7 G/DL — LOW (ref 32–37)
MCV RBC AUTO: 95.3 FL — SIGNIFICANT CHANGE UP (ref 81–99)
MONOCYTES # BLD AUTO: 0.51 K/UL — SIGNIFICANT CHANGE UP (ref 0.1–0.6)
MONOCYTES NFR BLD AUTO: 6.7 % — SIGNIFICANT CHANGE UP (ref 1.7–9.3)
NEUTROPHILS # BLD AUTO: 4.73 K/UL — SIGNIFICANT CHANGE UP (ref 1.4–6.5)
NEUTROPHILS NFR BLD AUTO: 62 % — SIGNIFICANT CHANGE UP (ref 42.2–75.2)
NRBC # BLD: 0 /100 WBCS — SIGNIFICANT CHANGE UP (ref 0–0)
PHOSPHATE SERPL-MCNC: 3.6 MG/DL — SIGNIFICANT CHANGE UP (ref 2.1–4.9)
PLATELET # BLD AUTO: 464 K/UL — HIGH (ref 130–400)
PMV BLD: 9.4 FL — SIGNIFICANT CHANGE UP (ref 7.4–10.4)
POTASSIUM SERPL-MCNC: 5.1 MMOL/L — HIGH (ref 3.5–5)
POTASSIUM SERPL-SCNC: 5.1 MMOL/L — HIGH (ref 3.5–5)
PROT SERPL-MCNC: 6.4 G/DL — SIGNIFICANT CHANGE UP (ref 6–8)
RBC # BLD: 3.39 M/UL — LOW (ref 4.2–5.4)
RBC # FLD: 19.1 % — HIGH (ref 11.5–14.5)
SODIUM SERPL-SCNC: 137 MMOL/L — SIGNIFICANT CHANGE UP (ref 135–146)
WBC # BLD: 7.63 K/UL — SIGNIFICANT CHANGE UP (ref 4.8–10.8)
WBC # FLD AUTO: 7.63 K/UL — SIGNIFICANT CHANGE UP (ref 4.8–10.8)

## 2024-06-08 PROCEDURE — 71045 X-RAY EXAM CHEST 1 VIEW: CPT | Mod: 26

## 2024-06-08 PROCEDURE — 99232 SBSQ HOSP IP/OBS MODERATE 35: CPT

## 2024-06-08 RX ORDER — SODIUM ZIRCONIUM CYCLOSILICATE 10 G/10G
5 POWDER, FOR SUSPENSION ORAL ONCE
Refills: 0 | Status: COMPLETED | OUTPATIENT
Start: 2024-06-08 | End: 2024-06-08

## 2024-06-08 RX ORDER — SODIUM ZIRCONIUM CYCLOSILICATE 10 G/10G
5 POWDER, FOR SUSPENSION ORAL ONCE
Refills: 0 | Status: DISCONTINUED | OUTPATIENT
Start: 2024-06-08 | End: 2024-06-08

## 2024-06-08 RX ADMIN — LEVETIRACETAM 750 MILLIGRAM(S): 250 TABLET, FILM COATED ORAL at 05:54

## 2024-06-08 RX ADMIN — LEVETIRACETAM 750 MILLIGRAM(S): 250 TABLET, FILM COATED ORAL at 17:36

## 2024-06-08 RX ADMIN — MIDODRINE HYDROCHLORIDE 10 MILLIGRAM(S): 2.5 TABLET ORAL at 13:27

## 2024-06-08 RX ADMIN — MIDODRINE HYDROCHLORIDE 10 MILLIGRAM(S): 2.5 TABLET ORAL at 21:01

## 2024-06-08 RX ADMIN — MEROPENEM 100 MILLIGRAM(S): 1 INJECTION INTRAVENOUS at 21:02

## 2024-06-08 RX ADMIN — SODIUM ZIRCONIUM CYCLOSILICATE 5 GRAM(S): 10 POWDER, FOR SUSPENSION ORAL at 12:20

## 2024-06-08 RX ADMIN — Medication 3 MILLILITER(S): at 09:21

## 2024-06-08 RX ADMIN — HEPARIN SODIUM 5000 UNIT(S): 5000 INJECTION INTRAVENOUS; SUBCUTANEOUS at 05:09

## 2024-06-08 RX ADMIN — MIDODRINE HYDROCHLORIDE 10 MILLIGRAM(S): 2.5 TABLET ORAL at 05:09

## 2024-06-08 RX ADMIN — HEPARIN SODIUM 5000 UNIT(S): 5000 INJECTION INTRAVENOUS; SUBCUTANEOUS at 17:36

## 2024-06-08 RX ADMIN — GABAPENTIN 300 MILLIGRAM(S): 400 CAPSULE ORAL at 17:36

## 2024-06-08 RX ADMIN — PANTOPRAZOLE SODIUM 40 MILLIGRAM(S): 20 TABLET, DELAYED RELEASE ORAL at 12:20

## 2024-06-08 RX ADMIN — Medication 3 MILLILITER(S): at 19:38

## 2024-06-08 RX ADMIN — NYSTATIN CREAM 1 APPLICATION(S): 100000 CREAM TOPICAL at 05:08

## 2024-06-08 RX ADMIN — MEROPENEM 100 MILLIGRAM(S): 1 INJECTION INTRAVENOUS at 05:09

## 2024-06-08 RX ADMIN — NYSTATIN CREAM 1 APPLICATION(S): 100000 CREAM TOPICAL at 17:37

## 2024-06-08 RX ADMIN — LINEZOLID 300 MILLIGRAM(S): 600 INJECTION, SOLUTION INTRAVENOUS at 17:36

## 2024-06-08 RX ADMIN — GABAPENTIN 300 MILLIGRAM(S): 400 CAPSULE ORAL at 05:09

## 2024-06-08 RX ADMIN — RALOXIFENE HYDROCHLORIDE 60 MILLIGRAM(S): 60 TABLET, COATED ORAL at 12:20

## 2024-06-08 RX ADMIN — MEROPENEM 100 MILLIGRAM(S): 1 INJECTION INTRAVENOUS at 13:21

## 2024-06-08 RX ADMIN — LINEZOLID 300 MILLIGRAM(S): 600 INJECTION, SOLUTION INTRAVENOUS at 05:54

## 2024-06-08 RX ADMIN — Medication 3 MILLILITER(S): at 13:55

## 2024-06-08 NOTE — PROGRESS NOTE ADULT - ASSESSMENT
57 yo F with hx of down syndrome, anemia, cerebral palsy, seizure, osteoporosis, chronic hypotension on midodrine p/w respiratory failure 2/2 presumed aspiration pneumonia      #Acute Hypoxemic Hypercapnic Respiratory Failure 2/2 possible aspiration PNA- Improved  #Septic shock- resolved  - Off pressors, on midodrine 10mg q8hrs, keep MAP>65  - Off Bipap, currently on 2L NC  - Wean off O2 as tolerated, keep SpO2 92-96%, NIV prn  - c/w duonebs q6/prn  - c/w IV Meropenem and Zyvox  - ID follow up for abx choice and duration for d/c planning   - c/w Aspiration precautions, frequent suctions, oral care    #Chronic Normocytic anemia  - Hb stable  - No evidence of bleeding    #Hx of Down Syndrome  #Hx of Cerebral Palsy  -  at baseline    #Hx of Seizures  - c/w Keppra    # Hyperkalemia  -Lokelma dose     DISPO: d/c planning to group home early next week - not discharge ready over the weekend - on IV abx  -updated Group HHA at bedside       Attending Physician Dr. Brianna James # [id number]

## 2024-06-08 NOTE — PROGRESS NOTE ADULT - SUBJECTIVE AND OBJECTIVE BOX
TEA ECHAVARRIA 58y Female  MRN#: [id number]   Hospital Day: 8d    SUBJECTIVE  Patient is a 58y old Female who presents with a chief complaint of Hypoxia and Tachypnea (06 Jun 2024 13:54)  Currently admitted to medicine with the primary diagnosis of Septic shock      INTERVAL HPI AND OVERNIGHT EVENTS:  Patient was examined and seen at bedside. This morning she is resting comfortably in bed. No issues or overnight events. Breathing comfortably and saturating well on 4 L/min NC.    OBJECTIVE  PAST MEDICAL & SURGICAL HISTORY  Down syndrome    Osteoporosis    Mild anemia    Neuropathy    Cerebral palsy    Seizure    Nonverbal    Hypotension  on midodrine    S/P debridement  of R hip on 3/2/21    S/P percutaneous endoscopic gastrostomy (PEG) tube placement      ALLERGIES:  chlorhexidine containing compounds (Rash (Mild to Mod))    MEDICATIONS:  STANDING MEDICATIONS  albuterol/ipratropium for Nebulization 3 milliLiter(s) Nebulizer every 6 hours  gabapentin 300 milliGRAM(s) Oral every 12 hours  heparin   Injectable 5000 Unit(s) SubCutaneous every 12 hours  levETIRAcetam  Solution 750 milliGRAM(s) Oral two times a day  linezolid  IVPB 600 milliGRAM(s) IV Intermittent every 12 hours  linezolid  IVPB      meropenem  IVPB 1000 milliGRAM(s) IV Intermittent every 8 hours  midodrine 10 milliGRAM(s) Oral every 8 hours  norepinephrine Infusion 0.05 MICROgram(s)/kG/Min IV Continuous <Continuous>  nystatin Cream 1 Application(s) Topical two times a day  pantoprazole   Suspension 40 milliGRAM(s) Oral daily  raloxifene 60 milliGRAM(s) Oral daily    PRN MEDICATIONS  acetaminophen     Tablet .. 650 milliGRAM(s) Oral every 6 hours PRN      VITAL SIGNS: Last 24 Hours  T(C): 36.7 (07 Jun 2024 05:28), Max: 36.9 (06 Jun 2024 19:29)  T(F): 98 (07 Jun 2024 05:28), Max: 98.4 (06 Jun 2024 19:29)  HR: 80 (07 Jun 2024 05:28) (80 - 100)  BP: 98/59 (07 Jun 2024 05:28) (90/55 - 136/78)  BP(mean): 72 (07 Jun 2024 05:28) (69 - 97)  RR: 20 (07 Jun 2024 05:28) (16 - 20)  SpO2: 96% (06 Jun 2024 19:29) (96% - 99%)    LABS:                        10.2   7.81  )-----------( 513      ( 07 Jun 2024 07:13 )             32.1     06-07    139  |  98  |  9<L>  ----------------------------<  88  5.2<H>   |  29  |  0.5<L>    Ca    9.0      07 Jun 2024 07:13  Phos  3.9     06-07  Mg     2.5     06-07    TPro  6.9  /  Alb  3.1<L>  /  TBili  <0.2  /  DBili  x   /  AST  16  /  ALT  9   /  AlkPhos  94  06-07      Urinalysis Basic - ( 07 Jun 2024 07:13 )    Color: x / Appearance: x / SG: x / pH: x  Gluc: 88 mg/dL / Ketone: x  / Bili: x / Urobili: x   Blood: x / Protein: x / Nitrite: x   Leuk Esterase: x / RBC: x / WBC x   Sq Epi: x / Non Sq Epi: x / Bacteria: x                RADIOLOGY:      PHYSICAL EXAM:  CONSTITUTIONAL: No acute distress, contorted female w/ Down syndrome/Cerebral palsy; non-verbal AOx0  HEAD: Atraumatic, normocephalic  EYES:  conjunctiva and sclera clear  ENT: no JVD; moist mucous membranes  PULMONARY: Clear to auscultation bilaterally; no wheezes, rales, or rhonchi  CARDIOVASCULAR: Regular rate and rhythm; no murmurs, rubs, or gallops  GASTROINTESTINAL: Soft, non-tender, non-distended; bowel sounds present  MUSCULOSKELETAL: 2+ peripheral pulses; no clubbing, no cyanosis, no edema  NEUROLOGY: non-focal  SKIN: macular rash of back s/p chlorhexidine exposure; stage 3 bandaged ulcers of b/l feet

## 2024-06-08 NOTE — PROGRESS NOTE ADULT - ASSESSMENT
59 yo F with hx of down syndrome, anemia, cerebral palsy, seizure, osteoporosis, chronic hypotension on midodrine p/w respiratory failure 2/2 presumed aspiration pneumonia    #Acute Hypoxemic Hypercapnic Respiratory Failure 2/2 aspiration PNA- Improved  #Septic shock- resolved  - Off pressors, on midodrine 10mg q8hrs, keep MAP>65  - Off Bipap, currently on 2L NC  - Wean off O2 as tolerated, keep SpO2 92-96%, NIV prn  - C/w 7d course of Meropenem and Zyvox per ID (6/4-6/11)  - C/w Duoneb q6/prn   - C/w Aspiration precautions, frequent suctions, oral care    #Contact dermatitis back   - Avoid chlorhexidine- pt is allergic to it  - Wound care recs appreciated; Cleanse rash to back and MASD to bilateral buttocks with soap and water. Pat dry, apply Dermaphor twice a day and prn for soiling.     #Chronic Normocytic anemia  - Hb stable  - No evidence of bleeding    #Hx of Down Syndrome  #Hx of Cerebral Palsy  - At baseline    #Hx of Seizures  - No evidence of seizures currently  - C/w Keppra    #MISC  - DVT prophylaxis: Hep subq  - GI prophylaxis: PPI  - Diet: PEG feeds  - Dispo: [facility] early next week

## 2024-06-08 NOTE — PROGRESS NOTE ADULT - SUBJECTIVE AND OBJECTIVE BOX
Patient is a 58y old  Female who presents with a chief complaint of Hypoxia and Tachypnea (06-06-24)      Pt seen this am   -with complicated hospital stay  -ID follow up   -on IV abx     Vital Signs Last 24 Hrs  T(C): 36.8 (08 Jun 2024 12:18), Max: 36.8 (08 Jun 2024 04:45)  T(F): 98.2 (08 Jun 2024 12:18), Max: 98.2 (08 Jun 2024 04:45)  HR: 85 (08 Jun 2024 12:18) (85 - 118)  BP: 108/80 (08 Jun 2024 12:18) (103/66 - 113/71)  BP(mean): --  RR: 18 (08 Jun 2024 12:18) (17 - 18)  SpO2: 97% (08 Jun 2024 12:18) (97% - 98%)      PHYSICAL EXAM:  GENERAL: Not in distress - comfortable in bed   CHEST/LUNG: decreased BS at bases   HEART: Regular rate and rhythm  ABDOMEN: Soft abdomen   PSYCH: intellectual disability   NEUROLOGY: unable to assess                                     9.9    7.63  )-----------( 464      ( 08 Jun 2024 07:01 )             32.3            06-08    137  |  98  |  11  ----------------------------<  120<H>  5.1<H>   |  30  |  0.5<L>    Ca    8.7      08 Jun 2024 07:01  Phos  3.6     06-08  Mg     2.4     06-08    TPro  6.4  /  Alb  2.9<L>  /  TBili  <0.2  /  DBili  x   /  AST  13  /  ALT  8   /  AlkPhos  103  06-08    MEDICATIONS  (STANDING):  albuterol/ipratropium for Nebulization 3 milliLiter(s) Nebulizer every 6 hours  gabapentin 300 milliGRAM(s) Oral every 12 hours  heparin   Injectable 5000 Unit(s) SubCutaneous every 12 hours  levETIRAcetam  Solution 750 milliGRAM(s) Oral two times a day  linezolid  IVPB      linezolid  IVPB 600 milliGRAM(s) IV Intermittent every 12 hours  meropenem  IVPB 1000 milliGRAM(s) IV Intermittent every 8 hours  midodrine 10 milliGRAM(s) Oral every 8 hours  nystatin Cream 1 Application(s) Topical two times a day  pantoprazole   Suspension 40 milliGRAM(s) Oral daily  raloxifene 60 milliGRAM(s) Oral daily    MEDICATIONS  (PRN):  acetaminophen     Tablet .. 650 milliGRAM(s) Oral every 6 hours PRN Temp greater or equal to 38C (100.4F), Mild Pain (1 - 3)

## 2024-06-09 LAB
ALBUMIN SERPL ELPH-MCNC: 3 G/DL — LOW (ref 3.5–5.2)
ALP SERPL-CCNC: 114 U/L — SIGNIFICANT CHANGE UP (ref 30–115)
ALT FLD-CCNC: 8 U/L — SIGNIFICANT CHANGE UP (ref 0–41)
ANION GAP SERPL CALC-SCNC: 10 MMOL/L — SIGNIFICANT CHANGE UP (ref 7–14)
AST SERPL-CCNC: 12 U/L — SIGNIFICANT CHANGE UP (ref 0–41)
BASOPHILS # BLD AUTO: 0.09 K/UL — SIGNIFICANT CHANGE UP (ref 0–0.2)
BASOPHILS NFR BLD AUTO: 1.4 % — HIGH (ref 0–1)
BILIRUB SERPL-MCNC: <0.2 MG/DL — SIGNIFICANT CHANGE UP (ref 0.2–1.2)
BUN SERPL-MCNC: 11 MG/DL — SIGNIFICANT CHANGE UP (ref 10–20)
CALCIUM SERPL-MCNC: 8.7 MG/DL — SIGNIFICANT CHANGE UP (ref 8.4–10.5)
CHLORIDE SERPL-SCNC: 96 MMOL/L — LOW (ref 98–110)
CO2 SERPL-SCNC: 31 MMOL/L — SIGNIFICANT CHANGE UP (ref 17–32)
CREAT SERPL-MCNC: 0.5 MG/DL — LOW (ref 0.7–1.5)
EGFR: 109 ML/MIN/1.73M2 — SIGNIFICANT CHANGE UP
EOSINOPHIL # BLD AUTO: 0.22 K/UL — SIGNIFICANT CHANGE UP (ref 0–0.7)
EOSINOPHIL NFR BLD AUTO: 3.5 % — SIGNIFICANT CHANGE UP (ref 0–8)
GLUCOSE SERPL-MCNC: 103 MG/DL — HIGH (ref 70–99)
HCT VFR BLD CALC: 32.7 % — LOW (ref 37–47)
HGB BLD-MCNC: 10.1 G/DL — LOW (ref 12–16)
IMM GRANULOCYTES NFR BLD AUTO: 0.5 % — HIGH (ref 0.1–0.3)
LYMPHOCYTES # BLD AUTO: 1.63 K/UL — SIGNIFICANT CHANGE UP (ref 1.2–3.4)
LYMPHOCYTES # BLD AUTO: 26 % — SIGNIFICANT CHANGE UP (ref 20.5–51.1)
MAGNESIUM SERPL-MCNC: 2.3 MG/DL — SIGNIFICANT CHANGE UP (ref 1.8–2.4)
MCHC RBC-ENTMCNC: 29.1 PG — SIGNIFICANT CHANGE UP (ref 27–31)
MCHC RBC-ENTMCNC: 30.9 G/DL — LOW (ref 32–37)
MCV RBC AUTO: 94.2 FL — SIGNIFICANT CHANGE UP (ref 81–99)
MONOCYTES # BLD AUTO: 0.48 K/UL — SIGNIFICANT CHANGE UP (ref 0.1–0.6)
MONOCYTES NFR BLD AUTO: 7.6 % — SIGNIFICANT CHANGE UP (ref 1.7–9.3)
NEUTROPHILS # BLD AUTO: 3.83 K/UL — SIGNIFICANT CHANGE UP (ref 1.4–6.5)
NEUTROPHILS NFR BLD AUTO: 61 % — SIGNIFICANT CHANGE UP (ref 42.2–75.2)
NRBC # BLD: 0 /100 WBCS — SIGNIFICANT CHANGE UP (ref 0–0)
PHOSPHATE SERPL-MCNC: 3.5 MG/DL — SIGNIFICANT CHANGE UP (ref 2.1–4.9)
PLATELET # BLD AUTO: 461 K/UL — HIGH (ref 130–400)
PMV BLD: 9.6 FL — SIGNIFICANT CHANGE UP (ref 7.4–10.4)
POTASSIUM SERPL-MCNC: 4.7 MMOL/L — SIGNIFICANT CHANGE UP (ref 3.5–5)
POTASSIUM SERPL-SCNC: 4.7 MMOL/L — SIGNIFICANT CHANGE UP (ref 3.5–5)
PROT SERPL-MCNC: 6.7 G/DL — SIGNIFICANT CHANGE UP (ref 6–8)
RBC # BLD: 3.47 M/UL — LOW (ref 4.2–5.4)
RBC # FLD: 19.4 % — HIGH (ref 11.5–14.5)
SODIUM SERPL-SCNC: 137 MMOL/L — SIGNIFICANT CHANGE UP (ref 135–146)
WBC # BLD: 6.28 K/UL — SIGNIFICANT CHANGE UP (ref 4.8–10.8)
WBC # FLD AUTO: 6.28 K/UL — SIGNIFICANT CHANGE UP (ref 4.8–10.8)

## 2024-06-09 PROCEDURE — 99232 SBSQ HOSP IP/OBS MODERATE 35: CPT

## 2024-06-09 PROCEDURE — 71045 X-RAY EXAM CHEST 1 VIEW: CPT | Mod: 26

## 2024-06-09 RX ADMIN — LINEZOLID 300 MILLIGRAM(S): 600 INJECTION, SOLUTION INTRAVENOUS at 05:19

## 2024-06-09 RX ADMIN — LEVETIRACETAM 750 MILLIGRAM(S): 250 TABLET, FILM COATED ORAL at 05:19

## 2024-06-09 RX ADMIN — NYSTATIN CREAM 1 APPLICATION(S): 100000 CREAM TOPICAL at 05:20

## 2024-06-09 RX ADMIN — Medication 3 MILLILITER(S): at 20:31

## 2024-06-09 RX ADMIN — GABAPENTIN 300 MILLIGRAM(S): 400 CAPSULE ORAL at 05:19

## 2024-06-09 RX ADMIN — LEVETIRACETAM 750 MILLIGRAM(S): 250 TABLET, FILM COATED ORAL at 17:55

## 2024-06-09 RX ADMIN — MEROPENEM 100 MILLIGRAM(S): 1 INJECTION INTRAVENOUS at 21:11

## 2024-06-09 RX ADMIN — LINEZOLID 300 MILLIGRAM(S): 600 INJECTION, SOLUTION INTRAVENOUS at 17:46

## 2024-06-09 RX ADMIN — MIDODRINE HYDROCHLORIDE 10 MILLIGRAM(S): 2.5 TABLET ORAL at 21:11

## 2024-06-09 RX ADMIN — Medication 3 MILLILITER(S): at 08:55

## 2024-06-09 RX ADMIN — PANTOPRAZOLE SODIUM 40 MILLIGRAM(S): 20 TABLET, DELAYED RELEASE ORAL at 14:22

## 2024-06-09 RX ADMIN — HEPARIN SODIUM 5000 UNIT(S): 5000 INJECTION INTRAVENOUS; SUBCUTANEOUS at 17:45

## 2024-06-09 RX ADMIN — NYSTATIN CREAM 1 APPLICATION(S): 100000 CREAM TOPICAL at 17:47

## 2024-06-09 RX ADMIN — MIDODRINE HYDROCHLORIDE 10 MILLIGRAM(S): 2.5 TABLET ORAL at 05:19

## 2024-06-09 RX ADMIN — HEPARIN SODIUM 5000 UNIT(S): 5000 INJECTION INTRAVENOUS; SUBCUTANEOUS at 05:19

## 2024-06-09 RX ADMIN — MEROPENEM 100 MILLIGRAM(S): 1 INJECTION INTRAVENOUS at 05:11

## 2024-06-09 RX ADMIN — MIDODRINE HYDROCHLORIDE 10 MILLIGRAM(S): 2.5 TABLET ORAL at 14:25

## 2024-06-09 RX ADMIN — MEROPENEM 100 MILLIGRAM(S): 1 INJECTION INTRAVENOUS at 14:22

## 2024-06-09 RX ADMIN — RALOXIFENE HYDROCHLORIDE 60 MILLIGRAM(S): 60 TABLET, COATED ORAL at 14:22

## 2024-06-09 RX ADMIN — Medication 3 MILLILITER(S): at 15:05

## 2024-06-09 RX ADMIN — GABAPENTIN 300 MILLIGRAM(S): 400 CAPSULE ORAL at 17:45

## 2024-06-09 NOTE — PROGRESS NOTE ADULT - ASSESSMENT
59 yo F with hx of down syndrome, anemia, cerebral palsy, seizure, osteoporosis, chronic hypotension on midodrine p/w respiratory failure 2/2 presumed aspiration pneumonia      #Acute Hypoxemic Hypercapnic Respiratory Failure 2/2 possible aspiration PNA- Improved  #Septic shock- resolved  - Off pressors, on midodrine 10mg q8hrs, keep MAP>65  - Off Bipap, currently on 2L NC  - Wean off O2 as tolerated, keep SpO2 92-96%, NIV prn  - c/w duonebs q6/prn  - c/w IV Meropenem and Zyvox  - ID follow up for abx choice and duration for d/c planning   - c/w Aspiration precautions, frequent suctions, oral care    #Chronic Normocytic anemia  - Hb stable  - No evidence of bleeding    #Hx of Down Syndrome  #Hx of Cerebral Palsy  -  at baseline    #Hx of Seizures  - c/w Keppra    # Hyperkalemia  -Lokelma dose given   -resolved     DISPO: d/c planning to group home early next week - not discharge ready over the weekend - on IV abx  -updated Group HHA at bedside       Attending Physician Dr. Brianna James # [id number]

## 2024-06-09 NOTE — PROGRESS NOTE ADULT - SUBJECTIVE AND OBJECTIVE BOX
Patient is a 58y old  Female who presents with a chief complaint of Hypoxia and Tachypnea (06-06-24)      Pt seen this am   -no overnight events notified   -d/c planning early next week back to group home    Vital Signs Last 24 Hrs  T(C): 36.7 (09 Jun 2024 12:15), Max: 36.7 (09 Jun 2024 04:29)  T(F): 98.1 (09 Jun 2024 12:15), Max: 98.1 (09 Jun 2024 04:29)  HR: 91 (09 Jun 2024 12:15) (88 - 99)  BP: 97/55 (09 Jun 2024 12:15) (97/55 - 118/52)  BP(mean): --  RR: 18 (09 Jun 2024 12:15) (18 - 18)  SpO2: 99% (09 Jun 2024 12:15) (99% - 99%)        PHYSICAL EXAM:  GENERAL: Not in distress - comfortable in bed -  group HHA at bedside   CHEST/LUNG: decreased BS at bases   HEART: Regular rate and rhythm  ABDOMEN: Soft abdomen   PSYCH: intellectual disability   NEUROLOGY: unable to assess                          10.1   6.28  )-----------( 461      ( 09 Jun 2024 06:17 )             32.7     06-09    137  |  96<L>  |  11  ----------------------------<  103<H>  4.7   |  31  |  0.5<L>    Ca    8.7      09 Jun 2024 06:17  Phos  3.5     06-09  Mg     2.3     06-09    TPro  6.7  /  Alb  3.0<L>  /  TBili  <0.2  /  DBili  x   /  AST  12  /  ALT  8   /  AlkPhos  114  06-09    MEDICATIONS  (STANDING):  albuterol/ipratropium for Nebulization 3 milliLiter(s) Nebulizer every 6 hours  gabapentin 300 milliGRAM(s) Oral every 12 hours  heparin   Injectable 5000 Unit(s) SubCutaneous every 12 hours  levETIRAcetam  Solution 750 milliGRAM(s) Oral two times a day  linezolid  IVPB      linezolid  IVPB 600 milliGRAM(s) IV Intermittent every 12 hours  meropenem  IVPB 1000 milliGRAM(s) IV Intermittent every 8 hours  midodrine 10 milliGRAM(s) Oral every 8 hours  nystatin Cream 1 Application(s) Topical two times a day  pantoprazole   Suspension 40 milliGRAM(s) Oral daily  raloxifene 60 milliGRAM(s) Oral daily    MEDICATIONS  (PRN):  acetaminophen     Tablet .. 650 milliGRAM(s) Oral every 6 hours PRN Temp greater or equal to 38C (100.4F), Mild Pain (1 - 3)

## 2024-06-10 ENCOUNTER — TRANSCRIPTION ENCOUNTER (OUTPATIENT)
Age: 59
End: 2024-06-10

## 2024-06-10 LAB
ALBUMIN SERPL ELPH-MCNC: 2.9 G/DL — LOW (ref 3.5–5.2)
ALP SERPL-CCNC: 105 U/L — SIGNIFICANT CHANGE UP (ref 30–115)
ALT FLD-CCNC: 8 U/L — SIGNIFICANT CHANGE UP (ref 0–41)
ANION GAP SERPL CALC-SCNC: 11 MMOL/L — SIGNIFICANT CHANGE UP (ref 7–14)
AST SERPL-CCNC: 11 U/L — SIGNIFICANT CHANGE UP (ref 0–41)
BASOPHILS # BLD AUTO: 0.1 K/UL — SIGNIFICANT CHANGE UP (ref 0–0.2)
BASOPHILS NFR BLD AUTO: 1.5 % — HIGH (ref 0–1)
BILIRUB SERPL-MCNC: <0.2 MG/DL — SIGNIFICANT CHANGE UP (ref 0.2–1.2)
BUN SERPL-MCNC: 11 MG/DL — SIGNIFICANT CHANGE UP (ref 10–20)
CALCIUM SERPL-MCNC: 8.6 MG/DL — SIGNIFICANT CHANGE UP (ref 8.4–10.4)
CHLORIDE SERPL-SCNC: 95 MMOL/L — LOW (ref 98–110)
CO2 SERPL-SCNC: 28 MMOL/L — SIGNIFICANT CHANGE UP (ref 17–32)
CREAT SERPL-MCNC: 0.5 MG/DL — LOW (ref 0.7–1.5)
EGFR: 109 ML/MIN/1.73M2 — SIGNIFICANT CHANGE UP
EOSINOPHIL # BLD AUTO: 0.2 K/UL — SIGNIFICANT CHANGE UP (ref 0–0.7)
EOSINOPHIL NFR BLD AUTO: 3 % — SIGNIFICANT CHANGE UP (ref 0–8)
GLUCOSE SERPL-MCNC: 170 MG/DL — HIGH (ref 70–99)
HCT VFR BLD CALC: 29.5 % — LOW (ref 37–47)
HGB BLD-MCNC: 9.3 G/DL — LOW (ref 12–16)
IMM GRANULOCYTES NFR BLD AUTO: 0.3 % — SIGNIFICANT CHANGE UP (ref 0.1–0.3)
LYMPHOCYTES # BLD AUTO: 1.84 K/UL — SIGNIFICANT CHANGE UP (ref 1.2–3.4)
LYMPHOCYTES # BLD AUTO: 27.7 % — SIGNIFICANT CHANGE UP (ref 20.5–51.1)
MAGNESIUM SERPL-MCNC: 2.3 MG/DL — SIGNIFICANT CHANGE UP (ref 1.8–2.4)
MCHC RBC-ENTMCNC: 29.2 PG — SIGNIFICANT CHANGE UP (ref 27–31)
MCHC RBC-ENTMCNC: 31.5 G/DL — LOW (ref 32–37)
MCV RBC AUTO: 92.8 FL — SIGNIFICANT CHANGE UP (ref 81–99)
MONOCYTES # BLD AUTO: 0.45 K/UL — SIGNIFICANT CHANGE UP (ref 0.1–0.6)
MONOCYTES NFR BLD AUTO: 6.8 % — SIGNIFICANT CHANGE UP (ref 1.7–9.3)
NEUTROPHILS # BLD AUTO: 4.04 K/UL — SIGNIFICANT CHANGE UP (ref 1.4–6.5)
NEUTROPHILS NFR BLD AUTO: 60.7 % — SIGNIFICANT CHANGE UP (ref 42.2–75.2)
NRBC # BLD: 0 /100 WBCS — SIGNIFICANT CHANGE UP (ref 0–0)
PHOSPHATE SERPL-MCNC: 3.5 MG/DL — SIGNIFICANT CHANGE UP (ref 2.1–4.9)
PLATELET # BLD AUTO: 423 K/UL — HIGH (ref 130–400)
PMV BLD: 9.3 FL — SIGNIFICANT CHANGE UP (ref 7.4–10.4)
POTASSIUM SERPL-MCNC: 4.5 MMOL/L — SIGNIFICANT CHANGE UP (ref 3.5–5)
POTASSIUM SERPL-SCNC: 4.5 MMOL/L — SIGNIFICANT CHANGE UP (ref 3.5–5)
PROT SERPL-MCNC: 6.5 G/DL — SIGNIFICANT CHANGE UP (ref 6–8)
RBC # BLD: 3.18 M/UL — LOW (ref 4.2–5.4)
RBC # FLD: 19.6 % — HIGH (ref 11.5–14.5)
SODIUM SERPL-SCNC: 134 MMOL/L — LOW (ref 135–146)
WBC # BLD: 6.65 K/UL — SIGNIFICANT CHANGE UP (ref 4.8–10.8)
WBC # FLD AUTO: 6.65 K/UL — SIGNIFICANT CHANGE UP (ref 4.8–10.8)

## 2024-06-10 PROCEDURE — 99232 SBSQ HOSP IP/OBS MODERATE 35: CPT

## 2024-06-10 PROCEDURE — 71045 X-RAY EXAM CHEST 1 VIEW: CPT | Mod: 26

## 2024-06-10 RX ORDER — NYSTATIN CREAM 100000 [USP'U]/G
1 CREAM TOPICAL
Qty: 0 | Refills: 0 | DISCHARGE
Start: 2024-06-10

## 2024-06-10 RX ORDER — HEPARIN SODIUM 5000 [USP'U]/ML
5000 INJECTION INTRAVENOUS; SUBCUTANEOUS
Qty: 0 | Refills: 0 | DISCHARGE
Start: 2024-06-10

## 2024-06-10 RX ORDER — NYSTATIN CREAM 100000 [USP'U]/G
1 CREAM TOPICAL
Qty: 1 | Refills: 0
Start: 2024-06-10 | End: 2024-07-09

## 2024-06-10 RX ADMIN — RALOXIFENE HYDROCHLORIDE 60 MILLIGRAM(S): 60 TABLET, COATED ORAL at 12:29

## 2024-06-10 RX ADMIN — NYSTATIN CREAM 1 APPLICATION(S): 100000 CREAM TOPICAL at 06:00

## 2024-06-10 RX ADMIN — Medication 3 MILLILITER(S): at 13:20

## 2024-06-10 RX ADMIN — LINEZOLID 300 MILLIGRAM(S): 600 INJECTION, SOLUTION INTRAVENOUS at 18:25

## 2024-06-10 RX ADMIN — HEPARIN SODIUM 5000 UNIT(S): 5000 INJECTION INTRAVENOUS; SUBCUTANEOUS at 18:25

## 2024-06-10 RX ADMIN — MIDODRINE HYDROCHLORIDE 10 MILLIGRAM(S): 2.5 TABLET ORAL at 23:23

## 2024-06-10 RX ADMIN — Medication 3 MILLILITER(S): at 08:38

## 2024-06-10 RX ADMIN — LEVETIRACETAM 750 MILLIGRAM(S): 250 TABLET, FILM COATED ORAL at 05:59

## 2024-06-10 RX ADMIN — MEROPENEM 100 MILLIGRAM(S): 1 INJECTION INTRAVENOUS at 13:23

## 2024-06-10 RX ADMIN — GABAPENTIN 300 MILLIGRAM(S): 400 CAPSULE ORAL at 18:25

## 2024-06-10 RX ADMIN — LINEZOLID 300 MILLIGRAM(S): 600 INJECTION, SOLUTION INTRAVENOUS at 06:07

## 2024-06-10 RX ADMIN — HEPARIN SODIUM 5000 UNIT(S): 5000 INJECTION INTRAVENOUS; SUBCUTANEOUS at 06:00

## 2024-06-10 RX ADMIN — MEROPENEM 100 MILLIGRAM(S): 1 INJECTION INTRAVENOUS at 05:59

## 2024-06-10 RX ADMIN — NYSTATIN CREAM 1 APPLICATION(S): 100000 CREAM TOPICAL at 18:28

## 2024-06-10 RX ADMIN — MIDODRINE HYDROCHLORIDE 10 MILLIGRAM(S): 2.5 TABLET ORAL at 13:23

## 2024-06-10 RX ADMIN — LEVETIRACETAM 750 MILLIGRAM(S): 250 TABLET, FILM COATED ORAL at 18:25

## 2024-06-10 RX ADMIN — MIDODRINE HYDROCHLORIDE 10 MILLIGRAM(S): 2.5 TABLET ORAL at 05:59

## 2024-06-10 RX ADMIN — GABAPENTIN 300 MILLIGRAM(S): 400 CAPSULE ORAL at 05:59

## 2024-06-10 RX ADMIN — MEROPENEM 100 MILLIGRAM(S): 1 INJECTION INTRAVENOUS at 23:22

## 2024-06-10 RX ADMIN — PANTOPRAZOLE SODIUM 40 MILLIGRAM(S): 20 TABLET, DELAYED RELEASE ORAL at 12:28

## 2024-06-10 RX ADMIN — Medication 3 MILLILITER(S): at 19:59

## 2024-06-10 NOTE — DISCHARGE NOTE PROVIDER - PROVIDER TOKENS
PROVIDER:[TOKEN:[[id number]],FOLLOWUP:[2 weeks],ESTABLISHEDPATIENT:[T]],PROVIDER:[TOKEN:[[id number]],FOLLOWUP:[2 weeks]] PROVIDER:[TOKEN:[[id number]],FOLLOWUP:[2 weeks],ESTABLISHEDPATIENT:[T]],PROVIDER:[TOKEN:[[id number]],FOLLOWUP:[2 weeks]],PROVIDER:[TOKEN:[[id number]],FOLLOWUP:[1 month]]

## 2024-06-10 NOTE — DISCHARGE NOTE PROVIDER - ATTENDING DISCHARGE PHYSICAL EXAMINATION:
Vital Signs Last 24 Hrs  T(F): 97.6 (13 Jun 2024 05:21), Max: 98.9 (12 Jun 2024 22:06)  HR: 92 (13 Jun 2024 05:21) (92 - 121)  BP: 109/65 (13 Jun 2024 05:21) (96/53 - 120/77)  RR: 20 (13 Jun 2024 05:21) (16 - 20)  SpO2: 100% (13 Jun 2024 05:21) (96% - 100%)    PHYSICAL EXAM:  GENERAL: NAD, well-groomed, poorly-developed  HEAD:  Atraumatic, microcephalic  EYES: EOMI, conjunctiva and sclera clear  ENMT: dry mucous membranes, Good dentition, no thrush  NECK: Supple, No JVD  CHEST/LUNG: scattered crackles, no wheezing   HEART: RRR; S1/S2, 2/6 systolic murmur   ABDOMEN: Soft, Nontender, Nondistended; Bowel sounds present  VASCULAR: Normal pulses, Normal capillary refill  EXTREMITIES: No calf tenderness, No cyanosis, No edema, muscle atrophy  LYMPH: Normal; No lymphadenopathy noted  SKIN: Warm, Intact  PSYCH: Normal mood, Normal affect  NERVOUS SYSTEM:  Alert, does not follow commands

## 2024-06-10 NOTE — PROGRESS NOTE ADULT - SUBJECTIVE AND OBJECTIVE BOX
Patient is a 58y old  Female who presents with a chief complaint of Hypoxia and Tachypnea (06-06-24)      Pt seen this am   -no overnight events notified   -d/c planning tomorrow back to group home     Vital Signs Last 24 Hrs  T(C): 36.4 (10 Osei 2024 12:53), Max: 36.8 (09 Jun 2024 21:49)  T(F): 97.5 (10 Osei 2024 12:53), Max: 98.2 (09 Jun 2024 21:49)  HR: 112 (10 Osei 2024 12:53) (90 - 112)  BP: 119/79 (10 Osei 2024 12:53) (98/58 - 119/79)  BP(mean): --  RR: 18 (10 Osei 2024 12:53) (17 - 18)  SpO2: 96% (10 Osei 2024 05:36) (96% - 98%)    Parameters below as of 10 Osei 2024 05:36  Patient On (Oxygen Delivery Method): nasal cannula  O2 Flow (L/min): 4      PHYSICAL EXAM:  GENERAL: Not in distress - comfortable in bed -  group HHA at bedside   CHEST/LUNG: decreased BS at bases   HEART: Regular rate and rhythm  ABDOMEN: Soft abdomen   PSYCH: intellectual disability   NEUROLOGY: unable to assess                          9.3    6.65  )-----------( 423      ( 10 Osei 2024 07:42 )             29.5                            06-10    134<L>  |  95<L>  |  11  ----------------------------<  170<H>  4.5   |  28  |  0.5<L>    Ca    8.6      10 Osei 2024 07:42  Phos  3.5     06-10  Mg     2.3     06-10    TPro  6.5  /  Alb  2.9<L>  /  TBili  <0.2  /  DBili  x   /  AST  11  /  ALT  8   /  AlkPhos  105  06-10    MEDICATIONS  (STANDING):  albuterol/ipratropium for Nebulization 3 milliLiter(s) Nebulizer every 6 hours  gabapentin 300 milliGRAM(s) Oral every 12 hours  heparin   Injectable 5000 Unit(s) SubCutaneous every 12 hours  levETIRAcetam  Solution 750 milliGRAM(s) Oral two times a day  linezolid  IVPB      linezolid  IVPB 600 milliGRAM(s) IV Intermittent every 12 hours  meropenem  IVPB 1000 milliGRAM(s) IV Intermittent every 8 hours  midodrine 10 milliGRAM(s) Oral every 8 hours  nystatin Cream 1 Application(s) Topical two times a day  pantoprazole   Suspension 40 milliGRAM(s) Oral daily  raloxifene 60 milliGRAM(s) Oral daily    MEDICATIONS  (PRN):  acetaminophen     Tablet .. 650 milliGRAM(s) Oral every 6 hours PRN Temp greater or equal to 38C (100.4F), Mild Pain (1 - 3)

## 2024-06-10 NOTE — DISCHARGE NOTE PROVIDER - CARE PROVIDERS DIRECT ADDRESSES
,[email or web address] ,[email or web address],DirectAddress_Unknown,[email or web address]

## 2024-06-10 NOTE — DISCHARGE NOTE PROVIDER - NSDCFUSCHEDAPPT_GEN_ALL_CORE_FT
Royer Cooper  [facility]  Scheduled Appointment: 07/30/2024    Royer Cooper Physician [address]  Scheduled Appointment: 07/30/2024

## 2024-06-10 NOTE — DISCHARGE NOTE PROVIDER - CARE PROVIDER_API CALL
Maggie Crow J  Internal Medicine  [address] [place] [address]  Phone: [phone]  Fax: [phone]  Established Patient  Follow Up Time: 2 weeks    Byron Dela Cruz  Infectious Disease  [address] [place] [address]  Phone: [phone]  Fax: [phone]  Follow Up Time: 2 weeks   Maggie Crow J  Internal Medicine  [address] [place] [address]  Phone: [phone]  Fax: [phone]  Established Patient  Follow Up Time: 2 weeks    Byron Dela Cruz  Infectious Disease  [address] [place] [address]  Phone: [phone]  Fax: [phone]  Follow Up Time: 2 weeks    Bean Burger  Dermatology  [address] [place] [address]  Phone: [phone]  Fax: [phone]  Follow Up Time: 1 month

## 2024-06-10 NOTE — DISCHARGE NOTE PROVIDER - NSDCCPCAREPLAN_GEN_ALL_CORE_FT
PRINCIPAL DISCHARGE DIAGNOSIS  Diagnosis: Septic shock  Assessment and Plan of Treatment: You presented to the hospital after being recently discharged for sepsis in the setting of community acquired pneumonia. Following discharge you developed respiratory failure with hypoxia. You were started on antibiotics as per your previous positive blood cultures. You required bilevel positive airway pressure (BiPAP) and vasopressors while in the intensive care unit. You were seen by Infectious Disease (ID) and have completed a course of antibiotics. Blood cultures have been negative. You were weaned off of BiPAP and onto nasal cannula and are now stable on nasal cannula. You are no longer dependent on vasopressors and have been downgraded to a general medicine floor (4A). You are now medically stable for discharge. Please take your medications as instructed, follow-up outpatient with primary care and ID, and return to the hospital if your symptoms return.      SECONDARY DISCHARGE DIAGNOSES  Diagnosis: Acute hypoxemic respiratory failure  Assessment and Plan of Treatment:     Diagnosis: Chronic anemia  Assessment and Plan of Treatment:     Diagnosis: Elevated troponin level  Assessment and Plan of Treatment:     Diagnosis: Pneumonia  Assessment and Plan of Treatment:

## 2024-06-10 NOTE — PROGRESS NOTE ADULT - ASSESSMENT
59 yo F with hx of down syndrome, anemia, cerebral palsy, seizure, osteoporosis, chronic hypotension on midodrine p/w respiratory failure 2/2 presumed aspiration pneumonia      #Acute Hypoxemic Hypercapnic Respiratory Failure 2/2 possible aspiration PNA- Improved  #Septic shock- resolved  - Off pressors, on midodrine 10mg q8hrs, keep MAP>65  - Off Bipap, currently on 2L NC  - Wean off O2 as tolerated, keep SpO2 92-96%, NIV prn  - c/w duonebs q6/prn  - c/w IV Meropenem and Zyvox  - ID follow up for abx choice and duration for d/c planning   - c/w Aspiration precautions, frequent suctions, oral care    #Chronic Normocytic anemia  - Hb stable  - No evidence of bleeding    #Hx of Down Syndrome  #Hx of Cerebral Palsy  -  at baseline    #Hx of Seizures  - c/w Keppra    # Hyperkalemia  -Lokelma dose given   -resolved     DISPO: d/c planning to group home tomorrow - finish abx 6/11  -updated Group HHA at bedside   -discussed with CM in rounds     Attending Physician Dr. Brianna James # [id number]

## 2024-06-10 NOTE — DISCHARGE NOTE PROVIDER - INSTRUCTIONS
Tube Feeding Modality: Gastrostomy  Jevity 1.2 Juventino (JEVITY1.2RTH)  Total Volume for 24 Hours (mL): 1170  Continuous  Starting Tube Feed Rate {mL per Hour}: 35  Until Goal Tube Feed Rate (mL per Hour): 65  Tube Feed Duration (in Hours): 18  Tube Feed Start Time: 10:00  Free Water Flush  Bolus   Total Volume per Flush (mL): 150   Frequency: Every 6 Hours Tube Feeding Modality: Gastrostomy  Jevity 1.2 Juventino (JEVITY1.2RTH)  Total Volume for 24 Hours (mL): 1200  Bolus  Total Volume of Bolus (mL):  300  Total # of Feeds: 4  Tube Feed Frequency: Every 6 hours   Tube Feed Start Time: 00:00  Bolus Feed Rate (mL per Hour): 300   Bolus Feed Duration (in Hours): 1  Free Water Flush  Free Water Flush Instructions:  50 mL pre/post feeds (06-12-24 @ 14:16) [Active]

## 2024-06-10 NOTE — DISCHARGE NOTE PROVIDER - NSDCMRMEDTOKEN_GEN_ALL_CORE_FT
docusate sodium 100 mg oral tablet: 1 tab(s) by PEG tube once a day as needed for  constipation  gabapentin 300 mg oral tablet: 1 cap(s) by PEG tube every 12 hours  heparin: 5,000 unit(s) subcutaneous 2 times a day  ipratropium-albuterol 0.5 mg-2.5 mg/3 mL inhalation solution: 3 milliliter(s) inhaled every 6 hours as needed for  shortness of breath and/or wheezing  Keppra 100 mg/mL oral solution: 7.5 milliliter(s) orally 2 times a day by peg  Melatonin 3 mg oral tablet: 1 tab(s) by PEG tube once a day (at bedtime)  midodrine 10 mg oral tablet: 1 tab(s) orally 3 times a day Please discontinue 5mg TID dose, and start 10mg TID dose  nystatin 100,000 units/g topical cream: 1 Apply topically to affected area 2 times a day  ocular lubricant ophthalmic solution: 1 drop(s) to each affected eye 2 times a day  Oyster Shell 500 (1250 mg calcium carbonate) oral tablet: 1 tab(s) by PEG tube once a day  Pepcid 40 mg oral tablet: 1 tab(s) by PEG tube 2 times a day  petrolatum topical ointment: 1 Apply topically to affected area 2 times a day  Protonix 40 mg oral delayed release tablet: 1 tab(s) by PEG tube once a day  raloxifene 60 mg oral tablet: 1 tab(s) by PEG tube once a day  senna leaf extract oral tablet: 2 tab(s) orally once a day (at bedtime)   docusate sodium 100 mg oral tablet: 1 tab(s) by PEG tube once a day as needed for  constipation  gabapentin 300 mg oral tablet: 1 cap(s) by PEG tube every 12 hours  ipratropium-albuterol 0.5 mg-2.5 mg/3 mL inhalation solution: 3 milliliter(s) inhaled every 6 hours as needed for  shortness of breath and/or wheezing  Keppra 100 mg/mL oral solution: 7.5 milliliter(s) orally 2 times a day by peg  Melatonin 3 mg oral tablet: 1 tab(s) by PEG tube once a day (at bedtime)  midodrine 10 mg oral tablet: 1 tab(s) orally 3 times a day Please discontinue 5mg TID dose, and start 10mg TID dose  nystatin 100,000 units/g topical cream: Apply topically to affected area 2 times a day 1 Apply topically to affected area 2 times a day  ocular lubricant ophthalmic solution: 1 drop(s) to each affected eye 2 times a day  Oyster Shell 500 (1250 mg calcium carbonate) oral tablet: 1 tab(s) by PEG tube once a day  Pepcid 40 mg oral tablet: 1 tab(s) by PEG tube 2 times a day  petrolatum topical ointment: 1 Apply topically to affected area 2 times a day  Protonix 40 mg oral delayed release tablet: 1 tab(s) by PEG tube once a day  raloxifene 60 mg oral tablet: 1 tab(s) by PEG tube once a day  senna leaf extract oral tablet: 2 tab(s) orally once a day (at bedtime)   docusate sodium 100 mg oral tablet: 1 tab(s) by PEG tube once a day as needed for  constipation  gabapentin 250 mg/5 mL oral solution: 6 milliliter(s) orally every 12 hours  ipratropium-albuterol 0.5 mg-2.5 mg/3 mL inhalation solution: 3 milliliter(s) inhaled every 6 hours as needed for  shortness of breath and/or wheezing  Keppra 100 mg/mL oral solution: 7.5 milliliter(s) orally 2 times a day by peg  Melatonin 3 mg oral tablet: 1 tab(s) by PEG tube once a day (at bedtime)  midodrine 10 mg oral tablet: 1 tab(s) orally 3 times a day Please discontinue 5mg TID dose, and start 10mg TID dose  nystatin 100,000 units/g topical powder: Apply topically to affected area 3 times a day 1 Apply topically to affected area 3 times a day  ocular lubricant ophthalmic solution: 1 drop(s) to each affected eye 2 times a day  Oyster Shell 500 (1250 mg calcium carbonate) oral tablet: 1 tab(s) by PEG tube once a day  Pepcid 40 mg oral tablet: 1 tab(s) by PEG tube 2 times a day  petrolatum topical ointment: 1 Apply topically to affected area 2 times a day  Protonix 40 mg oral delayed release tablet: 1 tab(s) by PEG tube once a day  raloxifene 60 mg oral tablet: 1 tab(s) by PEG tube once a day  senna leaf extract oral tablet: 2 tab(s) orally once a day (at bedtime)  triamcinolone 0.1% topical cream: Apply topically to affected area 2 times a day 1 Apply topically to affected area 2 times a day

## 2024-06-10 NOTE — DISCHARGE NOTE PROVIDER - HOSPITAL COURSE
HPI: 59 yo F with hx of down syndrome, anemia, cerebral palsy, seizure, osteoporosis, hypotension on midodrine, nonverbal at baseline who was BIBEMS from [facility] on 5/31 for respiratory distress. Per EMS, pt was satting 74% on 4L NC. Pt was admitted 4/16-5/29 for severe sepsis and respiratory failure 2/2 recurrent CAP. During admission, blood cx grew staph capitis and VRE which cleared after daptomycin and meropenem completed on 5/26. BRIANA was negative for endocarditis. Pt also had recurrent vomiting and aspiration despite PEG tube. Further hx limited as pt is nonverbal.     In the ED, patient was given dapto and danna as per previous cultures. Patient was given 3 L of IV fluids and then started on levophed. Labs showed WBC of 17 with left shift. Lactate 3.7->1.4. ABG shows mild respiratory acidosis. BIPAP applied and patient improved. CT abdomen was done. Central line was placed in the right IJ for fluid resuscitation and hemodynamic stability. Admitted to ICU for AHRF and septic shock 2/2 pneumonia-likely aspiration.    ICU COURSE:  In CCU (6/1-6/4) patient was weaned off of bipap and kept off sedatives. Peg tube fed. RVP negative but was found to be MRSA nares positive. Blood culture remains negative. Per ID, continue IV meropenem 1gm q8 and IV linezolid 600mg q12hrs. La Nena Hatch,  of consumer advisory board, was asking for palliative re-eval to determine GOC for patient. Last eval was in April 2024 and circumstances might've changed. They can be reached at [phone]. As of 6/3, patient hemodynamically stable on levo at 0.06 and NIV at night and as needed. Patient was responsive to cheetah, got got some fluids and was taken off pressors. On Midodrine 10mg q8. Central line removed. pt. was downgraded to SDU.    SDU COURSE:  Off Bipap currently on  2L NC. Continued with Abx- Meropenem and Zyvox. PEG feeds. Received 500cc bolus for MAP<65. BP remained stable on Midodrine 10mgq8. Off pressors. Labs and vitals remained stable. Pt. is stable to be downgraded to floors.      FOLLOW UP:  - Wean off O2 as tolerated, keep SpO2 92-96%, NIV prn  - c/w Meropenem and Zyvox, F/u ID for final Abx duration  - c/w Aspiration precautions, frequent suctions, oral care  - Avoid chlorhexidine- pt is allergic to it; F/u wound care consult for irritation dermatitis on the back from chlorhexidine wipes      Hospital Course (4A): The pt is breathing comfortably on NC 4L/min, saturating in the mid to high 90s. The pt has completed a 7-day course of meropenem and linezolid for pna per ID. Pt remains hemodynamically stable on midodrine 10 mg q8h and is off vasopressors (i.e. levophed). Pt medically stable for discharge.     #Acute Hypoxemic Hypercapnic Respiratory Failure 2/2 possible aspiration PNA- Improved  #Septic shock- resolved  - Off pressors, on midodrine 10mg q8hrs, keep MAP>65  - Off Bipap, currently on 4L NC  - Wean off O2 as tolerated, keep SpO2 92-96%, NIV prn  - c/w duonebs q6/prn  -s/p IV Meropenem and Zyvox  - c/w Aspiration precautions, frequent suctions, oral care    #Chronic Normocytic anemia  - Hb stable  - No evidence of bleeding    #Hx of Down Syndrome  #Hx of Cerebral Palsy  -  at baseline    #Hx of Seizures  - c/w Keppra HPI: 57 yo F with hx of down syndrome, anemia, cerebral palsy, seizure, osteoporosis, hypotension on midodrine, nonverbal at baseline who was BIBEMS from [facility] on 5/31 for respiratory distress. Per EMS, pt was satting 74% on 4L NC. Pt was admitted 4/16-5/29 for severe sepsis and respiratory failure 2/2 recurrent CAP. During admission, blood cx grew staph capitis and VRE which cleared after daptomycin and meropenem completed on 5/26. BRIANA was negative for endocarditis. Pt also had recurrent vomiting and aspiration despite PEG tube. Further hx limited as pt is nonverbal.     In the ED, patient was given dapto and danna as per previous cultures. Patient was given 3 L of IV fluids and then started on levophed. Labs showed WBC of 17 with left shift. Lactate 3.7->1.4. ABG shows mild respiratory acidosis. BIPAP applied and patient improved. CT abdomen was done. Central line was placed in the right IJ for fluid resuscitation and hemodynamic stability. Admitted to ICU for AHRF and septic shock 2/2 pneumonia-likely aspiration.    ICU COURSE:  In CCU (6/1-6/4) patient was weaned off of bipap and kept off sedatives. Peg tube fed. RVP negative but was found to be MRSA nares positive. Blood culture remains negative. Per ID, continue IV meropenem 1gm q8 and IV linezolid 600mg q12hrs. La Nena Hatch,  of consumer advisory board, was asking for palliative re-eval to determine GOC for patient. Last eval was in April 2024 and circumstances might've changed. They can be reached at [phone]. As of 6/3, patient hemodynamically stable on levo at 0.06 and NIV at night and as needed. Patient was responsive to cheetah, got got some fluids and was taken off pressors. On Midodrine 10mg q8. Central line removed. pt. was downgraded to SDU.    SDU COURSE:  Off Bipap currently on  2L NC. Continued with Abx- Meropenem and Zyvox. PEG feeds. Received 500cc bolus for MAP<65. BP remained stable on Midodrine 10mgq8. Off pressors. Labs and vitals remained stable. Pt. is stable to be downgraded to floors.      FOLLOW UP:  - Wean off O2 as tolerated, keep SpO2 92-96%, NIV prn  - c/w Meropenem and Zyvox, F/u ID for final Abx duration  - c/w Aspiration precautions, frequent suctions, oral care  - Avoid chlorhexidine- pt is allergic to it; F/u wound care consult for irritation dermatitis on the back from chlorhexidine wipes      Hospital Course (4A): The pt is breathing comfortably on NC 4L/min, saturating in the mid to high 90s. The pt has completed a 7-day course of meropenem and linezolid for pna per ID. Pt remains hemodynamically stable on midodrine 10 mg q8h and is off vasopressors (i.e. levophed). Pt medically stable for discharge.     #Acute Hypoxemic Hypercapnic Respiratory Failure 2/2 possible aspiration PNA- Improved  #Septic shock- resolved  - Off pressors, on midodrine 10mg q8hrs, keep MAP>65  - Off Bipap, currently on 4L NC  - Wean off O2 as tolerated, keep SpO2 92-96%, NIV prn  - c/w duonebs q6/prn  -s/p IV Meropenem and Zyvox  - c/w Aspiration precautions, frequent suctions, oral care    #Chronic Normocytic anemia  - Hb stable  - No evidence of bleeding    #Contact dermatitis back   - Avoid chlorhexidine- pt is allergic to it  - Wound care recs appreciated; Cleanse rash to back and MASD to bilateral buttocks with soap and water. Pat dry, apply Dermaphor twice a day and prn for soiling.   -Derm f/u OP     #Hx of Down Syndrome  #Hx of Cerebral Palsy  -  at baseline    #Hx of Seizures  - c/w Keppra HPI: 59 yo F with hx of down syndrome, anemia, cerebral palsy, seizure, osteoporosis, hypotension on midodrine, nonverbal at baseline who was BIBEMS from [facility] on 5/31 for respiratory distress. Per EMS, pt was satting 74% on 4L NC. Pt was admitted 4/16-5/29 for severe sepsis and respiratory failure 2/2 recurrent CAP. During admission, blood cx grew staph capitis and VRE which cleared after daptomycin and meropenem completed on 5/26. BRIANA was negative for endocarditis. Pt also had recurrent vomiting and aspiration despite PEG tube. Further hx limited as pt is nonverbal.     In the ED, patient was given dapto and danna as per previous cultures. Patient was given 3 L of IV fluids and then started on levophed. Labs showed WBC of 17 with left shift. Lactate 3.7->1.4. ABG shows mild respiratory acidosis. BIPAP applied and patient improved. CT abdomen was done. Central line was placed in the right IJ for fluid resuscitation and hemodynamic stability. Admitted to ICU for AHRF and septic shock 2/2 pneumonia-likely aspiration.    ICU COURSE:  In CCU (6/1-6/4) patient was weaned off of bipap and kept off sedatives. Peg tube fed. RVP negative but was found to be MRSA nares positive. Blood culture remains negative. Per ID, continue IV meropenem 1gm q8 and IV linezolid 600mg q12hrs. La Nena Hatch,  of consumer advisory board, was asking for palliative re-eval to determine GOC for patient. Last eval was in April 2024 and circumstances might've changed. They can be reached at [phone]. As of 6/3, patient hemodynamically stable on levo at 0.06 and NIV at night and as needed. Patient was responsive to cheetah, got got some fluids and was taken off pressors. On Midodrine 10mg q8. Central line removed. pt. was downgraded to SDU.    SDU COURSE:  Off Bipap currently on  2L NC. Continued with Abx- Meropenem and Zyvox. PEG feeds. Received 500cc bolus for MAP<65. BP remained stable on Midodrine 10mgq8. Off pressors. Labs and vitals remained stable. Pt. is stable to be downgraded to floors.      FOLLOW UP:  - Wean off O2 as tolerated, keep SpO2 92-96%, NIV prn  - c/w Meropenem and Zyvox, F/u ID for final Abx duration  - c/w Aspiration precautions, frequent suctions, oral care  - Avoid chlorhexidine- pt is allergic to it; F/u wound care consult for irritation dermatitis on the back from chlorhexidine wipes      Hospital Course (4A): The pt is breathing comfortably on NC 4L/min, saturating in the mid to high 90s. The pt has completed a 7-day course of meropenem and linezolid for pna per ID. Pt remains hemodynamically stable on midodrine 10 mg q8h and is off vasopressors (i.e. levophed). Pt noted to be tachycardic on 6/12 to 120. ECG showed sinus tachycardia. Labs showed wbc wnl. IVF were ordered for hydration. CXR unremarkable and largely unchanged from prior CXR. Pt medically stable for discharge.     #Acute Hypoxemic Hypercapnic Respiratory Failure 2/2 possible aspiration PNA- Improved  #Septic shock- resolved  - Off pressors, on midodrine 10mg q8hrs, keep MAP>65  - Off Bipap, currently on 4L NC  - Wean off O2 as tolerated, keep SpO2 92-96%, NIV prn  - c/w duonebs q6/prn  -s/p IV Meropenem and Zyvox  - c/w Aspiration precautions, frequent suctions, oral care    #Chronic Normocytic anemia  - Hb stable  - No evidence of bleeding    #Contact dermatitis back   - Avoid chlorhexidine- pt is allergic to it  - Wound care recs appreciated; Cleanse rash to back and MASD to bilateral buttocks with soap and water. Pat dry, apply Dermaphor twice a day and prn for soiling.   -Derm f/u OP     #Hx of Down Syndrome  #Hx of Cerebral Palsy  -  at baseline    #Hx of Seizures  - c/w Keppra HPI: 57 yo F with hx of down syndrome, anemia, cerebral palsy, seizure, osteoporosis, hypotension on midodrine, nonverbal at baseline who was BIBEMS from [facility] on 5/31 for respiratory distress. Per EMS, pt was satting 74% on 4L NC. Pt was admitted 4/16-5/29 for severe sepsis and respiratory failure 2/2 recurrent CAP. During admission, blood cx grew staph capitis and VRE which cleared after daptomycin and meropenem completed on 5/26. BRIANA was negative for endocarditis. Pt also had recurrent vomiting and aspiration despite PEG tube. Further hx limited as pt is nonverbal.     In the ED, patient was given dapto and danna as per previous cultures. Patient was given 3 L of IV fluids and then started on levophed. Labs showed WBC of 17 with left shift. Lactate 3.7->1.4. ABG shows mild respiratory acidosis. BIPAP applied and patient improved. CT abdomen was done. Central line was placed in the right IJ for fluid resuscitation and hemodynamic stability. Admitted to ICU for AHRF and septic shock 2/2 pneumonia-likely aspiration.    ICU COURSE:  In CCU (6/1-6/4) patient was weaned off of bipap and kept off sedatives. Peg tube fed. RVP negative but was found to be MRSA nares positive. Blood culture remains negative. Per ID, continue IV meropenem 1gm q8 and IV linezolid 600mg q12hrs. La Nena Hatch,  of consumer advisory board, was asking for palliative re-eval to determine GOC for patient. Last eval was in April 2024 and circumstances might've changed. They can be reached at [phone]. As of 6/3, patient hemodynamically stable on levo at 0.06 and NIV at night and as needed. Patient was responsive to cheetah, got got some fluids and was taken off pressors. On Midodrine 10mg q8. Central line removed. pt. was downgraded to SDU.    SDU COURSE:  Off Bipap currently on  2L NC. Continued with Abx- Meropenem and Zyvox. PEG feeds. Received 500cc bolus for MAP<65. BP remained stable on Midodrine 10mgq8. Off pressors. Labs and vitals remained stable. Pt. is stable to be downgraded to floors.      FOLLOW UP:  - Wean off O2 as tolerated, keep SpO2 92-96%, NIV prn  - c/w Meropenem and Zyvox, F/u ID for final Abx duration  - c/w Aspiration precautions, frequent suctions, oral care  - Avoid chlorhexidine- pt is allergic to it; F/u wound care consult for irritation dermatitis on the back from chlorhexidine wipes      Hospital Course (4A): The pt is breathing comfortably on NC 4L/min, saturating in the mid to high 90s. The pt has completed a 7-day course of meropenem and linezolid for pna per ID. Pt remains hemodynamically stable on midodrine 10 mg q8h and is off vasopressors (i.e. levophed). Pt noted to be tachycardic on 6/12 to 120. ECG showed sinus tachycardia. Labs showed wbc wnl. IVF were ordered for hydration. CXR unremarkable and largely unchanged from prior CXR. Pt medically stable for discharge.     #Acute Hypoxemic Hypercapnic Respiratory Failure 2/2 possible aspiration PNA- Improved  #Septic shock- resolved  - Off pressors, on midodrine 10mg q8hrs, keep MAP>65  - Off Bipap, currently on 4L NC  - Wean off O2 as tolerated, keep SpO2 92-96%, NIV prn  - c/w duonebs q6/prn  -s/p IV Meropenem and Zyvox  - c/w Aspiration precautions, frequent suctions, oral care    #Chronic Normocytic anemia  - Hb stable  - No evidence of bleeding    #Contact dermatitis back   - Avoid chlorhexidine- pt is allergic to it  - Wound care recs appreciated; Cleanse rash to back and MASD to bilateral buttocks with soap and water. Pat dry, apply Dermaphor twice a day and prn for soiling.   -Derm f/u OP     #Hx of Down Syndrome  #Hx of Cerebral Palsy  -  at baseline    #Hx of Seizures  - c/w Nanette    Patient was seen by Palliative care during the hospitalization.  She is dnr/dni.  According to Palliative care not appropriate for comfort care because her repeated aspiration PNAs are treatable.    Discharged home to assisted HPI: 57 yo F with hx of down syndrome, anemia, cerebral palsy, seizure, osteoporosis, hypotension on midodrine, nonverbal at baseline who was BIBEMS from [facility] on 5/31 for respiratory distress. Per EMS, pt was satting 74% on 4L NC. Pt was admitted 4/16-5/29 for severe sepsis and respiratory failure 2/2 recurrent CAP. During admission, blood cx grew staph capitis and VRE which cleared after daptomycin and meropenem completed on 5/26. BRIANA was negative for endocarditis. Pt also had recurrent vomiting and aspiration despite PEG tube. Further hx limited as pt is nonverbal.     In the ED, patient was given dapto and danna as per previous cultures. Patient was given 3 L of IV fluids and then started on levophed. Labs showed WBC of 17 with left shift. Lactate 3.7->1.4. ABG shows mild respiratory acidosis. BIPAP applied and patient improved. CT abdomen was done. Central line was placed in the right IJ for fluid resuscitation and hemodynamic stability. Admitted to ICU for AHRF and septic shock 2/2 pneumonia-likely aspiration.    ICU COURSE:  In CCU (6/1-6/4) patient was weaned off of bipap and kept off sedatives. Peg tube fed. RVP negative but was found to be MRSA nares positive. Blood culture remains negative. Per ID, continue IV meropenem 1gm q8 and IV linezolid 600mg q12hrs. La Nena Hatch,  of consumer advisory board, was asking for palliative re-eval to determine GOC for patient. Last eval was in April 2024 and circumstances might've changed. They can be reached at [phone]. As of 6/3, patient hemodynamically stable on levo at 0.06 and NIV at night and as needed. Patient was responsive to cheetah, got got some fluids and was taken off pressors. On Midodrine 10mg q8. Central line removed. pt. was downgraded to SDU.    SDU COURSE:  Off Bipap currently on  2L NC. Continued with Abx- Meropenem and Zyvox. PEG feeds. Received 500cc bolus for MAP<65. BP remained stable on Midodrine 10mgq8. Off pressors. Labs and vitals remained stable. Pt. is stable to be downgraded to floors.      FOLLOW UP:  - Wean off O2 as tolerated, keep SpO2 92-96%, NIV prn  - c/w Meropenem and Zyvox, F/u ID for final Abx duration  - c/w Aspiration precautions, frequent suctions, oral care  - Avoid chlorhexidine- pt is allergic to it; F/u wound care consult for irritation dermatitis on the back from chlorhexidine wipes      Hospital Course (4A): The pt is breathing comfortably on NC 4L/min, saturating in the mid to high 90s. The pt has completed a 7-day course of meropenem and linezolid for pna per ID. Pt remains hemodynamically stable on midodrine 10 mg q8h and is off vasopressors (i.e. levophed). Pt noted to be tachycardic on 6/12 to 120. ECG showed sinus tachycardia. Labs showed wbc wnl. IVF were ordered for hydration. CXR unremarkable and largely unchanged from prior CXR. Pt medically stable for discharge.   Chronic rt shoulder dislocation noted on CXR. F/u OP.     #Acute Hypoxemic Hypercapnic Respiratory Failure 2/2 possible aspiration PNA- Improved  #Septic shock- resolved  - Off pressors, on midodrine 10mg q8hrs, keep MAP>65  - Off Bipap, currently on 4L NC  - Wean off O2 as tolerated, keep SpO2 92-96%, NIV prn  - c/w duonebs q6/prn  -s/p IV Meropenem and Zyvox  - c/w Aspiration precautions, frequent suctions, oral care    #Chronic Normocytic anemia  - Hb stable  - No evidence of bleeding    #Contact dermatitis back   - Avoid chlorhexidine- pt is allergic to it  - Wound care recs appreciated; Cleanse rash to back and MASD to bilateral buttocks with soap and water. Pat dry, apply Dermaphor twice a day and prn for soiling.   -Derm f/u OP     #Hx of Down Syndrome  #Hx of Cerebral Palsy  -  at baseline    #Hx of Seizures  - c/w Nanette    Patient was seen by Palliative care during the hospitalization.  She is dnr/dni.  According to Palliative care not appropriate for comfort care because her repeated aspiration PNAs are treatable.    Discharged home to [facility]

## 2024-06-11 PROCEDURE — 99232 SBSQ HOSP IP/OBS MODERATE 35: CPT

## 2024-06-11 RX ORDER — NYSTATIN CREAM 100000 [USP'U]/G
1 CREAM TOPICAL THREE TIMES A DAY
Refills: 0 | Status: DISCONTINUED | OUTPATIENT
Start: 2024-06-11 | End: 2024-06-13

## 2024-06-11 RX ORDER — GABAPENTIN 400 MG/1
300 CAPSULE ORAL EVERY 12 HOURS
Refills: 0 | Status: DISCONTINUED | OUTPATIENT
Start: 2024-06-11 | End: 2024-06-13

## 2024-06-11 RX ADMIN — Medication 3 MILLILITER(S): at 20:02

## 2024-06-11 RX ADMIN — MEROPENEM 100 MILLIGRAM(S): 1 INJECTION INTRAVENOUS at 05:11

## 2024-06-11 RX ADMIN — PANTOPRAZOLE SODIUM 40 MILLIGRAM(S): 20 TABLET, DELAYED RELEASE ORAL at 12:54

## 2024-06-11 RX ADMIN — LEVETIRACETAM 750 MILLIGRAM(S): 250 TABLET, FILM COATED ORAL at 05:12

## 2024-06-11 RX ADMIN — LINEZOLID 300 MILLIGRAM(S): 600 INJECTION, SOLUTION INTRAVENOUS at 05:11

## 2024-06-11 RX ADMIN — NYSTATIN CREAM 1 APPLICATION(S): 100000 CREAM TOPICAL at 12:59

## 2024-06-11 RX ADMIN — LINEZOLID 300 MILLIGRAM(S): 600 INJECTION, SOLUTION INTRAVENOUS at 18:24

## 2024-06-11 RX ADMIN — MIDODRINE HYDROCHLORIDE 10 MILLIGRAM(S): 2.5 TABLET ORAL at 22:13

## 2024-06-11 RX ADMIN — NYSTATIN CREAM 1 APPLICATION(S): 100000 CREAM TOPICAL at 05:11

## 2024-06-11 RX ADMIN — RALOXIFENE HYDROCHLORIDE 60 MILLIGRAM(S): 60 TABLET, COATED ORAL at 12:54

## 2024-06-11 RX ADMIN — HEPARIN SODIUM 5000 UNIT(S): 5000 INJECTION INTRAVENOUS; SUBCUTANEOUS at 18:25

## 2024-06-11 RX ADMIN — Medication 3 MILLILITER(S): at 10:15

## 2024-06-11 RX ADMIN — GABAPENTIN 300 MILLIGRAM(S): 400 CAPSULE ORAL at 05:12

## 2024-06-11 RX ADMIN — LEVETIRACETAM 750 MILLIGRAM(S): 250 TABLET, FILM COATED ORAL at 18:25

## 2024-06-11 RX ADMIN — Medication 1 APPLICATION(S): at 18:41

## 2024-06-11 RX ADMIN — GABAPENTIN 300 MILLIGRAM(S): 400 CAPSULE ORAL at 18:43

## 2024-06-11 RX ADMIN — NYSTATIN CREAM 1 APPLICATION(S): 100000 CREAM TOPICAL at 18:39

## 2024-06-11 RX ADMIN — HEPARIN SODIUM 5000 UNIT(S): 5000 INJECTION INTRAVENOUS; SUBCUTANEOUS at 05:12

## 2024-06-11 RX ADMIN — Medication 3 MILLILITER(S): at 15:48

## 2024-06-11 NOTE — PROGRESS NOTE ADULT - ASSESSMENT
59 yo F with hx of down syndrome, anemia, cerebral palsy, seizure, osteoporosis, chronic hypotension on midodrine p/w respiratory failure 2/2 presumed aspiration pneumonia    #Acute Hypoxemic Hypercapnic Respiratory Failure 2/2 aspiration PNA- Improved  #Septic shock- resolved  - Off pressors, on midodrine 10mg q8hrs, keep MAP>65  - Off Bipap, currently on 4L NC  - Wean off O2 as tolerated, keep SpO2 92-96%, NIV prn  - C/w 7d course of Meropenem and Zyvox per ID (6/4-6/11)  - C/w Duoneb q6/prn   - C/w Aspiration precautions, frequent suctions, oral care    #Contact dermatitis back   - Avoid chlorhexidine- pt is allergic to it  - Wound care recs appreciated; Cleanse rash to back and MASD to bilateral buttocks with soap and water. Pat dry, apply Dermaphor twice a day and prn for soiling.     #Maculopapular rash rt antecubital fossa  -trial of triamcinolone cream    #Chronic Normocytic anemia  - Hb stable  - No evidence of bleeding    #Hx of Down Syndrome  #Hx of Cerebral Palsy  - At baseline    #Hx of Seizures  - No evidence of seizures currently  - C/w Keppra    #MISC  - DVT prophylaxis: Hep subq  - GI prophylaxis: PPI  - Diet: PEG feeds  - Dispo: [facility] early next week

## 2024-06-11 NOTE — PROGRESS NOTE ADULT - SUBJECTIVE AND OBJECTIVE BOX
Patient is a 58y old  Female who presents with a chief complaint of Hypoxia and Tachypnea (06-06-24)      Pt seen this am   -no overnight events notified   -d/c planning tomorrow back to group home - discussed with CM in rounds   -patient uses 2L NC o2 at group home (not new this admission)    Vital Signs Last 24 Hrs  T(C): 36.6 (11 Jun 2024 12:35), Max: 36.6 (10 Osei 2024 20:48)  T(F): 97.9 (11 Jun 2024 12:35), Max: 97.9 (10 Osei 2024 20:48)  HR: 102 (11 Jun 2024 12:35) (88 - 114)  BP: 138/64 (11 Jun 2024 12:35) (99/62 - 138/64)  BP(mean): --  RR: 18 (11 Jun 2024 12:35) (18 - 18)  SpO2: 98% (11 Jun 2024 12:35) (85% - 98%)    Parameters below as of 11 Jun 2024 09:30  Patient On (Oxygen Delivery Method): nasal cannula  O2 Flow (L/min): 2      PHYSICAL EXAM:  GENERAL: Not in distress - comfortable in bed -  group HHA at bedside   CHEST/LUNG: decreased BS at bases   HEART: Regular rate and rhythm  ABDOMEN: Soft abdomen   PSYCH: intellectual disability   NEUROLOGY: unable to assess                          9.3    6.65  )-----------( 423      ( 10 Osei 2024 07:42 )             29.5                            06-10    134<L>  |  95<L>  |  11  ----------------------------<  170<H>  4.5   |  28  |  0.5<L>    Ca    8.6      10 Osei 2024 07:42  Phos  3.5     06-10  Mg     2.3     06-10    TPro  6.5  /  Alb  2.9<L>  /  TBili  <0.2  /  DBili  x   /  AST  11  /  ALT  8   /  AlkPhos  105  06-10    MEDICATIONS  (STANDING):  albuterol/ipratropium for Nebulization 3 milliLiter(s) Nebulizer every 6 hours  gabapentin Solution 300 milliGRAM(s) Oral every 12 hours  heparin   Injectable 5000 Unit(s) SubCutaneous every 12 hours  levETIRAcetam  Solution 750 milliGRAM(s) Oral two times a day  linezolid  IVPB      linezolid  IVPB 600 milliGRAM(s) IV Intermittent every 12 hours  midodrine 10 milliGRAM(s) Oral every 8 hours  nystatin Cream 1 Application(s) Topical two times a day  nystatin Powder 1 Application(s) Topical three times a day  pantoprazole   Suspension 40 milliGRAM(s) Oral daily  raloxifene 60 milliGRAM(s) Oral daily  triamcinolone 0.1% Cream 1 Application(s) Topical two times a day    MEDICATIONS  (PRN):  acetaminophen     Tablet .. 650 milliGRAM(s) Oral every 6 hours PRN Temp greater or equal to 38C (100.4F), Mild Pain (1 - 3)

## 2024-06-11 NOTE — PROGRESS NOTE ADULT - ASSESSMENT
57 yo F with hx of down syndrome, anemia, cerebral palsy, seizure, osteoporosis, chronic hypotension on midodrine p/w respiratory failure 2/2 presumed aspiration pneumonia      #Acute on chronic Hypoxemic Hypercapnic Respiratory Failure 2/2 possible aspiration PNA- Improved  #Home O2 dependent 2 L   #Septic shock- resolved  - Off pressors, on midodrine 10mg q8hrs, keep MAP>65  - Off Bipap, currently on 2L NC  - Wean off O2 as tolerated, keep SpO2 92-96%, NIV prn  - c/w duonebs q6/prn  - last day of abx today   - c/w Aspiration precautions, frequent suctions, oral care    #Chronic Normocytic anemia  - Hb stable  - No evidence of bleeding    #Hx of Down Syndrome  #Hx of Cerebral Palsy  -  at baseline    #Hx of Seizures  - c/w Keppra    # Hyperkalemia  -Lokelma dose given   -resolved     DISPO: d/c planning to group home tomorrow - anticipated and discussed with CM in rounds     Attending Physician Dr. Brianna James # [id number]

## 2024-06-11 NOTE — PROGRESS NOTE ADULT - SUBJECTIVE AND OBJECTIVE BOX
TEA ECHAVARRIA 58y Female  MRN#: [id number]   Hospital Day: 11d    SUBJECTIVE  Patient is a 58y old Female who presents with a chief complaint of Hypoxia and Tachypnea (10 Osei 2024 16:01)  Currently admitted to medicine with the primary diagnosis of Septic shock      INTERVAL HPI AND OVERNIGHT EVENTS:  Patient was examined and seen at bedside. This morning she is resting comfortably in bed. No issues or overnight events.    OBJECTIVE  PAST MEDICAL & SURGICAL HISTORY  Down syndrome    Osteoporosis    Mild anemia    Neuropathy    Cerebral palsy    Seizure    Nonverbal    Hypotension  on midodrine    S/P debridement  of R hip on 3/2/21    S/P percutaneous endoscopic gastrostomy (PEG) tube placement      ALLERGIES:  chlorhexidine containing compounds (Rash (Mild to Mod))    MEDICATIONS:  STANDING MEDICATIONS  albuterol/ipratropium for Nebulization 3 milliLiter(s) Nebulizer every 6 hours  gabapentin 300 milliGRAM(s) Oral every 12 hours  heparin   Injectable 5000 Unit(s) SubCutaneous every 12 hours  levETIRAcetam  Solution 750 milliGRAM(s) Oral two times a day  linezolid  IVPB      linezolid  IVPB 600 milliGRAM(s) IV Intermittent every 12 hours  midodrine 10 milliGRAM(s) Oral every 8 hours  nystatin Cream 1 Application(s) Topical two times a day  nystatin Powder 1 Application(s) Topical three times a day  pantoprazole   Suspension 40 milliGRAM(s) Oral daily  raloxifene 60 milliGRAM(s) Oral daily  triamcinolone 0.1% Cream 1 Application(s) Topical two times a day    PRN MEDICATIONS  acetaminophen     Tablet .. 650 milliGRAM(s) Oral every 6 hours PRN      VITAL SIGNS: Last 24 Hours  T(C): 36.6 (11 Jun 2024 05:00), Max: 36.6 (10 Osei 2024 20:48)  T(F): 97.8 (11 Jun 2024 05:00), Max: 97.9 (10 Osei 2024 20:48)  HR: 114 (11 Jun 2024 05:00) (88 - 114)  BP: 135/81 (11 Jun 2024 05:00) (99/62 - 135/81)  BP(mean): --  RR: 18 (11 Jun 2024 09:30) (18 - 18)  SpO2: 95% (11 Jun 2024 09:30) (85% - 97%)    LABS:                        9.3    6.65  )-----------( 423      ( 10 Osei 2024 07:42 )             29.5     06-10    134<L>  |  95<L>  |  11  ----------------------------<  170<H>  4.5   |  28  |  0.5<L>    Ca    8.6      10 Osei 2024 07:42  Phos  3.5     06-10  Mg     2.3     06-10    TPro  6.5  /  Alb  2.9<L>  /  TBili  <0.2  /  DBili  x   /  AST  11  /  ALT  8   /  AlkPhos  105  06-10      Urinalysis Basic - ( 10 Osei 2024 07:42 )    Color: x / Appearance: x / SG: x / pH: x  Gluc: 170 mg/dL / Ketone: x  / Bili: x / Urobili: x   Blood: x / Protein: x / Nitrite: x   Leuk Esterase: x / RBC: x / WBC x   Sq Epi: x / Non Sq Epi: x / Bacteria: x                RADIOLOGY:      PHYSICAL EXAM:  CONSTITUTIONAL: No acute distress, contorted female w/ Down syndrome/Cerebral palsy; non-verbal AOx0  HEAD: Atraumatic, normocephalic  EYES:  conjunctiva and sclera clear  ENT: no JVD; moist mucous membranes  PULMONARY: Clear to auscultation bilaterally; no wheezes, rales, or rhonchi  CARDIOVASCULAR: Regular rate and rhythm; no murmurs, rubs, or gallops  GASTROINTESTINAL: Soft, non-tender, non-distended; bowel sounds present  MUSCULOSKELETAL: 2+ peripheral pulses; no clubbing, no cyanosis, no edema  NEUROLOGY: non-focal  SKIN: macular rash of back s/p chlorhexidine exposure; stage 3 bandaged ulcers of b/l feet; maculopapular rash rt antecubital fossa

## 2024-06-12 LAB
ALBUMIN SERPL ELPH-MCNC: 3.1 G/DL — LOW (ref 3.5–5.2)
ALP SERPL-CCNC: 111 U/L — SIGNIFICANT CHANGE UP (ref 30–115)
ALT FLD-CCNC: 9 U/L — SIGNIFICANT CHANGE UP (ref 0–41)
ANION GAP SERPL CALC-SCNC: 12 MMOL/L — SIGNIFICANT CHANGE UP (ref 7–14)
AST SERPL-CCNC: 12 U/L — SIGNIFICANT CHANGE UP (ref 0–41)
BASOPHILS # BLD AUTO: 0.07 K/UL — SIGNIFICANT CHANGE UP (ref 0–0.2)
BASOPHILS NFR BLD AUTO: 1.3 % — HIGH (ref 0–1)
BILIRUB SERPL-MCNC: <0.2 MG/DL — SIGNIFICANT CHANGE UP (ref 0.2–1.2)
BUN SERPL-MCNC: 12 MG/DL — SIGNIFICANT CHANGE UP (ref 10–20)
CALCIUM SERPL-MCNC: 8.8 MG/DL — SIGNIFICANT CHANGE UP (ref 8.4–10.4)
CHLORIDE SERPL-SCNC: 97 MMOL/L — LOW (ref 98–110)
CO2 SERPL-SCNC: 28 MMOL/L — SIGNIFICANT CHANGE UP (ref 17–32)
CREAT SERPL-MCNC: 0.5 MG/DL — LOW (ref 0.7–1.5)
EGFR: 109 ML/MIN/1.73M2 — SIGNIFICANT CHANGE UP
EOSINOPHIL # BLD AUTO: 0.1 K/UL — SIGNIFICANT CHANGE UP (ref 0–0.7)
EOSINOPHIL NFR BLD AUTO: 1.8 % — SIGNIFICANT CHANGE UP (ref 0–8)
GLUCOSE SERPL-MCNC: 95 MG/DL — SIGNIFICANT CHANGE UP (ref 70–99)
HCT VFR BLD CALC: 30 % — LOW (ref 37–47)
HGB BLD-MCNC: 9.5 G/DL — LOW (ref 12–16)
IMM GRANULOCYTES NFR BLD AUTO: 0.4 % — HIGH (ref 0.1–0.3)
LYMPHOCYTES # BLD AUTO: 1.71 K/UL — SIGNIFICANT CHANGE UP (ref 1.2–3.4)
LYMPHOCYTES # BLD AUTO: 31 % — SIGNIFICANT CHANGE UP (ref 20.5–51.1)
MCHC RBC-ENTMCNC: 29.4 PG — SIGNIFICANT CHANGE UP (ref 27–31)
MCHC RBC-ENTMCNC: 31.7 G/DL — LOW (ref 32–37)
MCV RBC AUTO: 92.9 FL — SIGNIFICANT CHANGE UP (ref 81–99)
MONOCYTES # BLD AUTO: 0.34 K/UL — SIGNIFICANT CHANGE UP (ref 0.1–0.6)
MONOCYTES NFR BLD AUTO: 6.2 % — SIGNIFICANT CHANGE UP (ref 1.7–9.3)
NEUTROPHILS # BLD AUTO: 3.28 K/UL — SIGNIFICANT CHANGE UP (ref 1.4–6.5)
NEUTROPHILS NFR BLD AUTO: 59.3 % — SIGNIFICANT CHANGE UP (ref 42.2–75.2)
NRBC # BLD: 0 /100 WBCS — SIGNIFICANT CHANGE UP (ref 0–0)
PLATELET # BLD AUTO: 373 K/UL — SIGNIFICANT CHANGE UP (ref 130–400)
PMV BLD: 9.3 FL — SIGNIFICANT CHANGE UP (ref 7.4–10.4)
POTASSIUM SERPL-MCNC: 4.5 MMOL/L — SIGNIFICANT CHANGE UP (ref 3.5–5)
POTASSIUM SERPL-SCNC: 4.5 MMOL/L — SIGNIFICANT CHANGE UP (ref 3.5–5)
PROCALCITONIN SERPL-MCNC: 0.11 NG/ML — HIGH (ref 0.02–0.1)
PROT SERPL-MCNC: 6.9 G/DL — SIGNIFICANT CHANGE UP (ref 6–8)
RBC # BLD: 3.23 M/UL — LOW (ref 4.2–5.4)
RBC # FLD: 19.9 % — HIGH (ref 11.5–14.5)
SODIUM SERPL-SCNC: 137 MMOL/L — SIGNIFICANT CHANGE UP (ref 135–146)
WBC # BLD: 5.52 K/UL — SIGNIFICANT CHANGE UP (ref 4.8–10.8)
WBC # FLD AUTO: 5.52 K/UL — SIGNIFICANT CHANGE UP (ref 4.8–10.8)

## 2024-06-12 PROCEDURE — 71045 X-RAY EXAM CHEST 1 VIEW: CPT | Mod: 26

## 2024-06-12 PROCEDURE — 93010 ELECTROCARDIOGRAM REPORT: CPT

## 2024-06-12 PROCEDURE — 99232 SBSQ HOSP IP/OBS MODERATE 35: CPT

## 2024-06-12 RX ORDER — GABAPENTIN 400 MG/1
6 CAPSULE ORAL
Qty: 360 | Refills: 0
Start: 2024-06-12 | End: 2024-07-11

## 2024-06-12 RX ORDER — SODIUM CHLORIDE 9 MG/ML
1000 INJECTION INTRAMUSCULAR; INTRAVENOUS; SUBCUTANEOUS
Refills: 0 | Status: DISCONTINUED | OUTPATIENT
Start: 2024-06-12 | End: 2024-06-13

## 2024-06-12 RX ORDER — NYSTATIN CREAM 100000 [USP'U]/G
1 CREAM TOPICAL
Qty: 1 | Refills: 2
Start: 2024-06-12 | End: 2024-09-09

## 2024-06-12 RX ORDER — SCOPALAMINE 1 MG/3D
1 PATCH, EXTENDED RELEASE TRANSDERMAL ONCE
Refills: 0 | Status: COMPLETED | OUTPATIENT
Start: 2024-06-12 | End: 2024-06-12

## 2024-06-12 RX ORDER — GABAPENTIN 400 MG/1
1 CAPSULE ORAL
Qty: 0 | Refills: 0 | DISCHARGE

## 2024-06-12 RX ADMIN — LEVETIRACETAM 750 MILLIGRAM(S): 250 TABLET, FILM COATED ORAL at 05:57

## 2024-06-12 RX ADMIN — NYSTATIN CREAM 1 APPLICATION(S): 100000 CREAM TOPICAL at 13:05

## 2024-06-12 RX ADMIN — MIDODRINE HYDROCHLORIDE 10 MILLIGRAM(S): 2.5 TABLET ORAL at 13:06

## 2024-06-12 RX ADMIN — GABAPENTIN 300 MILLIGRAM(S): 400 CAPSULE ORAL at 17:51

## 2024-06-12 RX ADMIN — Medication 1 APPLICATION(S): at 05:56

## 2024-06-12 RX ADMIN — RALOXIFENE HYDROCHLORIDE 60 MILLIGRAM(S): 60 TABLET, COATED ORAL at 11:46

## 2024-06-12 RX ADMIN — MIDODRINE HYDROCHLORIDE 10 MILLIGRAM(S): 2.5 TABLET ORAL at 06:06

## 2024-06-12 RX ADMIN — Medication 1 APPLICATION(S): at 17:50

## 2024-06-12 RX ADMIN — NYSTATIN CREAM 1 APPLICATION(S): 100000 CREAM TOPICAL at 04:32

## 2024-06-12 RX ADMIN — GABAPENTIN 300 MILLIGRAM(S): 400 CAPSULE ORAL at 05:57

## 2024-06-12 RX ADMIN — NYSTATIN CREAM 1 APPLICATION(S): 100000 CREAM TOPICAL at 22:25

## 2024-06-12 RX ADMIN — SODIUM CHLORIDE 50 MILLILITER(S): 9 INJECTION INTRAMUSCULAR; INTRAVENOUS; SUBCUTANEOUS at 20:30

## 2024-06-12 RX ADMIN — Medication 3 MILLILITER(S): at 07:58

## 2024-06-12 RX ADMIN — HEPARIN SODIUM 5000 UNIT(S): 5000 INJECTION INTRAVENOUS; SUBCUTANEOUS at 17:52

## 2024-06-12 RX ADMIN — SCOPALAMINE 1 PATCH: 1 PATCH, EXTENDED RELEASE TRANSDERMAL at 10:26

## 2024-06-12 RX ADMIN — NYSTATIN CREAM 1 APPLICATION(S): 100000 CREAM TOPICAL at 05:56

## 2024-06-12 RX ADMIN — Medication 3 MILLILITER(S): at 13:50

## 2024-06-12 RX ADMIN — Medication 3 MILLILITER(S): at 19:55

## 2024-06-12 RX ADMIN — Medication 3 MILLILITER(S): at 05:38

## 2024-06-12 RX ADMIN — SODIUM CHLORIDE 50 MILLILITER(S): 9 INJECTION INTRAMUSCULAR; INTRAVENOUS; SUBCUTANEOUS at 10:26

## 2024-06-12 RX ADMIN — SCOPALAMINE 1 PATCH: 1 PATCH, EXTENDED RELEASE TRANSDERMAL at 20:23

## 2024-06-12 RX ADMIN — LEVETIRACETAM 750 MILLIGRAM(S): 250 TABLET, FILM COATED ORAL at 17:51

## 2024-06-12 RX ADMIN — NYSTATIN CREAM 1 APPLICATION(S): 100000 CREAM TOPICAL at 17:50

## 2024-06-12 RX ADMIN — HEPARIN SODIUM 5000 UNIT(S): 5000 INJECTION INTRAVENOUS; SUBCUTANEOUS at 05:58

## 2024-06-12 RX ADMIN — PANTOPRAZOLE SODIUM 40 MILLIGRAM(S): 20 TABLET, DELAYED RELEASE ORAL at 11:46

## 2024-06-12 RX ADMIN — LINEZOLID 300 MILLIGRAM(S): 600 INJECTION, SOLUTION INTRAVENOUS at 05:56

## 2024-06-12 RX ADMIN — MIDODRINE HYDROCHLORIDE 10 MILLIGRAM(S): 2.5 TABLET ORAL at 22:25

## 2024-06-12 RX ADMIN — NYSTATIN CREAM 1 APPLICATION(S): 100000 CREAM TOPICAL at 07:16

## 2024-06-12 NOTE — PROGRESS NOTE ADULT - ASSESSMENT
57 yo F with hx of down syndrome, anemia, cerebral palsy, seizure, osteoporosis, chronic hypotension on midodrine p/w respiratory failure 2/2 presumed aspiration pneumonia    #Acute Hypoxemic Hypercapnic Respiratory Failure 2/2 aspiration PNA- Improved  #Septic shock- resolved  - Off pressors, on midodrine 10mg q8hrs, keep MAP>65  - Off Bipap, currently on 4L NC  - Wean off O2 as tolerated, keep SpO2 92-96%, NIV prn  - C/w 7d course of Meropenem and Zyvox per ID (6/4-6/11)  - C/w Duoneb q6/prn   - C/w Aspiration precautions, frequent suctions, oral care    #Contact dermatitis back   - Avoid chlorhexidine- pt is allergic to it  - Wound care recs appreciated; Cleanse rash to back and MASD to bilateral buttocks with soap and water. Pat dry, apply Dermaphor twice a day and prn for soiling.     #Maculopapular rash rt antecubital fossa  -trial of triamcinolone cream    #Chronic Normocytic anemia  - Hb stable  - No evidence of bleeding    #Hx of Down Syndrome  #Hx of Cerebral Palsy  - At baseline    #Hx of Seizures  - No evidence of seizures currently  - C/w Keppra    #MISC  - DVT prophylaxis: Hep subq  - GI prophylaxis: PPI  - Diet: PEG feeds  - Dispo: GH early next week 0 -3.37 -3.4

## 2024-06-12 NOTE — PROGRESS NOTE ADULT - ASSESSMENT
59 yo F with hx of down syndrome, anemia, cerebral palsy, seizure, osteoporosis, chronic hypotension on midodrine p/w respiratory failure 2/2 presumed aspiration pneumonia    #Acute Hypoxemic Hypercapnic Respiratory Failure 2/2 aspiration PNA- Improved  #Septic shock- resolved  - Off pressors, on midodrine 10mg q8hrs, keep MAP>65  - Off Bipap, currently on 4L NC  - Wean off O2 as tolerated, keep SpO2 92-96%, NIV prn  -s/p 7d course of Meropenem and Zyvox per ID (6/4-6/11)  - C/w Duoneb q6/prn   - C/w Aspiration precautions, frequent suctions, oral care  -scopolamine for congestion/secretions    #Tachycardia  -ECG: sinus tachycardia  -CXR performed (no significant acute change compared to previous scan); f/u official read  -11 am labs and procal ordered     #Contact dermatitis back   - Avoid chlorhexidine- pt is allergic to it  - Wound care recs appreciated; Cleanse rash to back and MASD to bilateral buttocks with soap and water. Pat dry, apply Dermaphor twice a day and prn for soiling.     #Maculopapular rash rt antecubital fossa  -trial of triamcinolone cream    #Chronic Normocytic anemia  - Hb stable  - No evidence of bleeding    #Hx of Down Syndrome  #Hx of Cerebral Palsy  - At baseline    #Hx of Seizures  - No evidence of seizures currently  - C/w Keppra    #MISC  - DVT prophylaxis: Hep subq  - GI prophylaxis: PPI  - Diet: PEG feeds  - Dispo: GH early next week

## 2024-06-12 NOTE — PROGRESS NOTE ADULT - SUBJECTIVE AND OBJECTIVE BOX
TEA ECHAVARRIA 58y Female  MRN#: [id number]   Hospital Day: 12d    SUBJECTIVE  Patient is a 58y old Female who presents with a chief complaint of Hypoxia and Tachypnea (12 Jun 2024 09:20)  Currently admitted to medicine with the primary diagnosis of Septic shock      INTERVAL HPI AND OVERNIGHT EVENTS:  Patient was examined and seen at bedside. This morning she is tachypneic to 24 and tachycardic to 120.     OBJECTIVE  PAST MEDICAL & SURGICAL HISTORY  Down syndrome    Osteoporosis    Mild anemia    Neuropathy    Cerebral palsy    Seizure    Nonverbal    Hypotension  on midodrine    S/P debridement  of R hip on 3/2/21    S/P percutaneous endoscopic gastrostomy (PEG) tube placement      ALLERGIES:  chlorhexidine containing compounds (Rash (Mild to Mod))    MEDICATIONS:  STANDING MEDICATIONS  albuterol/ipratropium for Nebulization 3 milliLiter(s) Nebulizer every 6 hours  gabapentin Solution 300 milliGRAM(s) Oral every 12 hours  heparin   Injectable 5000 Unit(s) SubCutaneous every 12 hours  levETIRAcetam  Solution 750 milliGRAM(s) Oral two times a day  midodrine 10 milliGRAM(s) Oral every 8 hours  nystatin Cream 1 Application(s) Topical two times a day  nystatin Powder 1 Application(s) Topical three times a day  pantoprazole   Suspension 40 milliGRAM(s) Oral daily  raloxifene 60 milliGRAM(s) Oral daily  scopolamine 1 mG/72 Hr(s) Patch 1 Patch Transdermal once  triamcinolone 0.1% Cream 1 Application(s) Topical two times a day    PRN MEDICATIONS  acetaminophen     Tablet .. 650 milliGRAM(s) Oral every 6 hours PRN      VITAL SIGNS: Last 24 Hours  T(C): 36.7 (12 Jun 2024 04:54), Max: 36.7 (11 Jun 2024 20:13)  T(F): 98.1 (12 Jun 2024 04:54), Max: 98.1 (12 Jun 2024 04:54)  HR: 116 (12 Jun 2024 08:56) (102 - 123)  BP: 109/68 (12 Jun 2024 04:54) (104/67 - 138/64)  BP(mean): --  RR: 18 (12 Jun 2024 08:56) (17 - 18)  SpO2: 93% (12 Jun 2024 08:56) (93% - 98%)    LABS:                        RADIOLOGY:      PHYSICAL EXAM:  CONSTITUTIONAL: No acute distress, contorted female w/ Down syndrome/Cerebral palsy; non-verbal AOx0  HEAD: Atraumatic, normocephalic  EYES:  conjunctiva and sclera clear  ENT: no JVD; moist mucous membranes  PULMONARY: Upper airway congestion noted. Sating well on 3 L/min NC  CARDIOVASCULAR: Regular rate and rhythm; no murmurs, rubs, or gallops  GASTROINTESTINAL: Soft, non-tender, non-distended; bowel sounds present  MUSCULOSKELETAL: 2+ peripheral pulses; no clubbing, no cyanosis, no edema  NEUROLOGY: non-focal  SKIN: macular rash of back s/p chlorhexidine exposure; stage 3 bandaged ulcers of b/l feet; maculopapular rash rt antecubital fossa

## 2024-06-12 NOTE — PROGRESS NOTE ADULT - SUBJECTIVE AND OBJECTIVE BOX
Patient is a 58y old  Female who presents with a chief complaint of Hypoxia and Tachypnea (12 Jun 2024 09:33)      Patient seen and examined at bedside.  Patient is nonverbal   ALLERGIES:  chlorhexidine containing compounds (Rash (Mild to Mod))    MEDICATIONS:  acetaminophen     Tablet .. 650 milliGRAM(s) Oral every 6 hours PRN  albuterol/ipratropium for Nebulization 3 milliLiter(s) Nebulizer every 6 hours  gabapentin Solution 300 milliGRAM(s) Oral every 12 hours  heparin   Injectable 5000 Unit(s) SubCutaneous every 12 hours  levETIRAcetam  Solution 750 milliGRAM(s) Oral two times a day  midodrine 10 milliGRAM(s) Oral every 8 hours  nystatin Cream 1 Application(s) Topical two times a day  nystatin Powder 1 Application(s) Topical three times a day  pantoprazole   Suspension 40 milliGRAM(s) Oral daily  raloxifene 60 milliGRAM(s) Oral daily  sodium chloride 0.9%. 1000 milliLiter(s) IV Continuous <Continuous>  triamcinolone 0.1% Cream 1 Application(s) Topical two times a day    Vital Signs Last 24 Hrs  T(F): 97.7 (12 Jun 2024 11:20), Max: 98.1 (12 Jun 2024 04:54)  HR: 116 (12 Jun 2024 11:20) (116 - 123)  BP: 120/77 (12 Jun 2024 11:20) (104/67 - 120/77)  RR: 18 (12 Jun 2024 11:20) (17 - 18)  SpO2: 96% (12 Jun 2024 11:20) (93% - 96%)  I&O's Summary    11 Jun 2024 07:01  -  12 Jun 2024 07:00  --------------------------------------------------------  IN: 0 mL / OUT: 400 mL / NET: -400 mL        PHYSICAL EXAM:  General: NAD, Alert  ENT: MMM  Neck: Supple, No JVD  Lungs: diminished, right lower crackle   Cardio: RRR, S1/S2, 2/6 systolic murmur   Abdomen: Soft, Nontender, Nondistended; Bowel sounds present  Extremities: No cyanosis, No edema    LABS:                        9.5    5.52  )-----------( 373      ( 12 Jun 2024 11:58 )             30.0     06-12    137  |  97  |  12  ----------------------------<  95  4.5   |  28  |  0.5    Ca    8.8      12 Jun 2024 11:58  Phos  3.5     06-10  Mg     2.3     06-10    TPro  6.9  /  Alb  3.1  /  TBili  <0.2  /  DBili  x   /  AST  12  /  ALT  9   /  AlkPhos  111  06-12              06-01 Chol 103 mg/dL LDL -- HDL 31 mg/dL Trig 90 mg/dL                  Urinalysis Basic - ( 12 Jun 2024 11:58 )    Color: x / Appearance: x / SG: x / pH: x  Gluc: 95 mg/dL / Ketone: x  / Bili: x / Urobili: x   Blood: x / Protein: x / Nitrite: x   Leuk Esterase: x / RBC: x / WBC x   Sq Epi: x / Non Sq Epi: x / Bacteria: x            RADIOLOGY & ADDITIONAL TESTS:  < from: Xray Chest 1 View-PORTABLE IMMEDIATE (Xray Chest 1 View-PORTABLE IMMEDIATE .) (06.12.24 @ 08:44) >    Impression:    Bilateral basilar opacities. Stable osseous structures.    < end of copied text >    Care Discussed with Consultants/Other Providers:

## 2024-06-12 NOTE — PROGRESS NOTE ADULT - ASSESSMENT
57 yo F with hx of down syndrome, anemia, cerebral palsy, chronic right shoulder dislocation, seizure, osteoporosis, chronic hypotension on midodrine p/w respiratory failure 2/2 presumed aspiration pneumonia    #Acute Hypoxemic Hypercapnic Respiratory Failure 2/2 aspiration PNA- Improved  #Septic shock- resolved  - Off pressors, on midodrine 10mg q8hrs, keep MAP>65  - Off Bipap, currently on 4L NC  - Wean off O2 as tolerated, keep SpO2 92-96%, NIV prn  -s/p 7d course of Meropenem and Zyvox per ID (6/4-6/11)  - C/w Duoneb q6/prn   - C/w Aspiration precautions, frequent suctions, oral care  -scopolamine for congestion/secretions    #Tachycardia  -ECG: sinus tachycardia  -CXR performed stable opacities   -no significant lab abnormaties  -pt given IVF for 6hrs      #Contact dermatitis back   - Avoid chlorhexidine- pt is allergic to it  - Wound care recs appreciated; Cleanse rash to back and MASD to bilateral buttocks with soap and water. Pat dry, apply Dermaphor twice a day and prn for soiling.     #Maculopapular rash rt antecubital fossa  -trial of triamcinolone cream    #Chronic Normocytic anemia  - Hb stable  - No evidence of bleeding    #Hx of Down Syndrome  #Hx of Cerebral Palsy  - At baseline    #Hx of Seizures  - No evidence of seizures currently  - C/w Keppra    #MISC  - DVT prophylaxis: Hep subq  - GI prophylaxis: PPI  - Diet: PEG feeds  - Dispo:  6/13

## 2024-06-12 NOTE — PROGRESS NOTE ADULT - REASON FOR ADMISSION
Hypoxia and Tachypnea

## 2024-06-13 ENCOUNTER — TRANSCRIPTION ENCOUNTER (OUTPATIENT)
Age: 59
End: 2024-06-13

## 2024-06-13 VITALS
DIASTOLIC BLOOD PRESSURE: 68 MMHG | OXYGEN SATURATION: 95 % | HEART RATE: 90 BPM | SYSTOLIC BLOOD PRESSURE: 110 MMHG | RESPIRATION RATE: 19 BRPM

## 2024-06-13 LAB
ALBUMIN SERPL ELPH-MCNC: 3 G/DL — LOW (ref 3.5–5.2)
ALP SERPL-CCNC: 103 U/L — SIGNIFICANT CHANGE UP (ref 30–115)
ALT FLD-CCNC: 10 U/L — SIGNIFICANT CHANGE UP (ref 0–41)
ANION GAP SERPL CALC-SCNC: 8 MMOL/L — SIGNIFICANT CHANGE UP (ref 7–14)
AST SERPL-CCNC: 13 U/L — SIGNIFICANT CHANGE UP (ref 0–41)
BASOPHILS # BLD AUTO: 0.1 K/UL — SIGNIFICANT CHANGE UP (ref 0–0.2)
BASOPHILS NFR BLD AUTO: 1.7 % — HIGH (ref 0–1)
BILIRUB SERPL-MCNC: <0.2 MG/DL — SIGNIFICANT CHANGE UP (ref 0.2–1.2)
BUN SERPL-MCNC: 14 MG/DL — SIGNIFICANT CHANGE UP (ref 10–20)
CALCIUM SERPL-MCNC: 8.7 MG/DL — SIGNIFICANT CHANGE UP (ref 8.4–10.5)
CHLORIDE SERPL-SCNC: 103 MMOL/L — SIGNIFICANT CHANGE UP (ref 98–110)
CO2 SERPL-SCNC: 29 MMOL/L — SIGNIFICANT CHANGE UP (ref 17–32)
CREAT SERPL-MCNC: 0.5 MG/DL — LOW (ref 0.7–1.5)
EGFR: 109 ML/MIN/1.73M2 — SIGNIFICANT CHANGE UP
EOSINOPHIL # BLD AUTO: 0.17 K/UL — SIGNIFICANT CHANGE UP (ref 0–0.7)
EOSINOPHIL NFR BLD AUTO: 2.9 % — SIGNIFICANT CHANGE UP (ref 0–8)
GLUCOSE SERPL-MCNC: 111 MG/DL — HIGH (ref 70–99)
HCT VFR BLD CALC: 28.2 % — LOW (ref 37–47)
HGB BLD-MCNC: 9 G/DL — LOW (ref 12–16)
IMM GRANULOCYTES NFR BLD AUTO: 0.3 % — SIGNIFICANT CHANGE UP (ref 0.1–0.3)
LYMPHOCYTES # BLD AUTO: 1.39 K/UL — SIGNIFICANT CHANGE UP (ref 1.2–3.4)
LYMPHOCYTES # BLD AUTO: 23.4 % — SIGNIFICANT CHANGE UP (ref 20.5–51.1)
MAGNESIUM SERPL-MCNC: 2.5 MG/DL — HIGH (ref 1.8–2.4)
MCHC RBC-ENTMCNC: 30.3 PG — SIGNIFICANT CHANGE UP (ref 27–31)
MCHC RBC-ENTMCNC: 31.9 G/DL — LOW (ref 32–37)
MCV RBC AUTO: 94.9 FL — SIGNIFICANT CHANGE UP (ref 81–99)
MONOCYTES # BLD AUTO: 0.43 K/UL — SIGNIFICANT CHANGE UP (ref 0.1–0.6)
MONOCYTES NFR BLD AUTO: 7.3 % — SIGNIFICANT CHANGE UP (ref 1.7–9.3)
NEUTROPHILS # BLD AUTO: 3.82 K/UL — SIGNIFICANT CHANGE UP (ref 1.4–6.5)
NEUTROPHILS NFR BLD AUTO: 64.4 % — SIGNIFICANT CHANGE UP (ref 42.2–75.2)
NRBC # BLD: 0 /100 WBCS — SIGNIFICANT CHANGE UP (ref 0–0)
PLATELET # BLD AUTO: 347 K/UL — SIGNIFICANT CHANGE UP (ref 130–400)
PMV BLD: 9.7 FL — SIGNIFICANT CHANGE UP (ref 7.4–10.4)
POTASSIUM SERPL-MCNC: 4.6 MMOL/L — SIGNIFICANT CHANGE UP (ref 3.5–5)
POTASSIUM SERPL-SCNC: 4.6 MMOL/L — SIGNIFICANT CHANGE UP (ref 3.5–5)
PROT SERPL-MCNC: 6.4 G/DL — SIGNIFICANT CHANGE UP (ref 6–8)
RBC # BLD: 2.97 M/UL — LOW (ref 4.2–5.4)
RBC # FLD: 20.1 % — HIGH (ref 11.5–14.5)
SODIUM SERPL-SCNC: 140 MMOL/L — SIGNIFICANT CHANGE UP (ref 135–146)
WBC # BLD: 5.93 K/UL — SIGNIFICANT CHANGE UP (ref 4.8–10.8)
WBC # FLD AUTO: 5.93 K/UL — SIGNIFICANT CHANGE UP (ref 4.8–10.8)

## 2024-06-13 PROCEDURE — 99239 HOSP IP/OBS DSCHRG MGMT >30: CPT

## 2024-06-13 RX ADMIN — LEVETIRACETAM 750 MILLIGRAM(S): 250 TABLET, FILM COATED ORAL at 06:43

## 2024-06-13 RX ADMIN — PANTOPRAZOLE SODIUM 40 MILLIGRAM(S): 20 TABLET, DELAYED RELEASE ORAL at 12:24

## 2024-06-13 RX ADMIN — GABAPENTIN 300 MILLIGRAM(S): 400 CAPSULE ORAL at 06:43

## 2024-06-13 RX ADMIN — NYSTATIN CREAM 1 APPLICATION(S): 100000 CREAM TOPICAL at 06:44

## 2024-06-13 RX ADMIN — Medication 3 MILLILITER(S): at 08:20

## 2024-06-13 RX ADMIN — RALOXIFENE HYDROCHLORIDE 60 MILLIGRAM(S): 60 TABLET, COATED ORAL at 12:24

## 2024-06-13 RX ADMIN — HEPARIN SODIUM 5000 UNIT(S): 5000 INJECTION INTRAVENOUS; SUBCUTANEOUS at 06:42

## 2024-06-13 RX ADMIN — NYSTATIN CREAM 1 APPLICATION(S): 100000 CREAM TOPICAL at 15:24

## 2024-06-13 RX ADMIN — Medication 1 APPLICATION(S): at 06:44

## 2024-06-13 RX ADMIN — MIDODRINE HYDROCHLORIDE 10 MILLIGRAM(S): 2.5 TABLET ORAL at 06:43

## 2024-06-13 RX ADMIN — MIDODRINE HYDROCHLORIDE 10 MILLIGRAM(S): 2.5 TABLET ORAL at 15:23

## 2024-06-13 RX ADMIN — SCOPALAMINE 1 PATCH: 1 PATCH, EXTENDED RELEASE TRANSDERMAL at 07:46

## 2024-06-13 NOTE — DISCHARGE NOTE NURSING/CASE MANAGEMENT/SOCIAL WORK - NSDCPEFALRISK_GEN_ALL_CORE
For information on Fall & Injury Prevention, visit: [email or web address] OR  [email or web address] OR  https://www.cdc.gov/steadi/patient.html

## 2024-06-13 NOTE — DISCHARGE NOTE NURSING/CASE MANAGEMENT/SOCIAL WORK - PATIENT PORTAL LINK FT
You can access the FollowMyHealth Patient Portal offered by [facility] by registering at the following website: [email or web address]. By joining [employer]’s FollowMyHealth portal, you will also be able to view your health information using other applications (apps) compatible with our system.

## 2024-06-21 DIAGNOSIS — D64.9 ANEMIA, UNSPECIFIED: ICD-10-CM

## 2024-06-21 DIAGNOSIS — J18.9 PNEUMONIA, UNSPECIFIED ORGANISM: ICD-10-CM

## 2024-06-21 DIAGNOSIS — L89.610 PRESSURE ULCER OF RIGHT HEEL, UNSTAGEABLE: ICD-10-CM

## 2024-06-21 DIAGNOSIS — L25.9 UNSPECIFIED CONTACT DERMATITIS, UNSPECIFIED CAUSE: ICD-10-CM

## 2024-06-21 DIAGNOSIS — L89.620 PRESSURE ULCER OF LEFT HEEL, UNSTAGEABLE: ICD-10-CM

## 2024-06-21 DIAGNOSIS — J96.01 ACUTE RESPIRATORY FAILURE WITH HYPOXIA: ICD-10-CM

## 2024-06-21 DIAGNOSIS — Q90.9 DOWN SYNDROME, UNSPECIFIED: ICD-10-CM

## 2024-06-21 DIAGNOSIS — Z11.52 ENCOUNTER FOR SCREENING FOR COVID-19: ICD-10-CM

## 2024-06-21 DIAGNOSIS — E87.20 ACIDOSIS, UNSPECIFIED: ICD-10-CM

## 2024-06-21 DIAGNOSIS — Z93.1 GASTROSTOMY STATUS: ICD-10-CM

## 2024-06-21 DIAGNOSIS — J96.02 ACUTE RESPIRATORY FAILURE WITH HYPERCAPNIA: ICD-10-CM

## 2024-06-21 DIAGNOSIS — J69.0 PNEUMONITIS DUE TO INHALATION OF FOOD AND VOMIT: ICD-10-CM

## 2024-06-21 DIAGNOSIS — G40.909 EPILEPSY, UNSPECIFIED, NOT INTRACTABLE, WITHOUT STATUS EPILEPTICUS: ICD-10-CM

## 2024-06-21 DIAGNOSIS — R65.21 SEVERE SEPSIS WITH SEPTIC SHOCK: ICD-10-CM

## 2024-06-21 DIAGNOSIS — B95.62 METHICILLIN RESISTANT STAPHYLOCOCCUS AUREUS INFECTION AS THE CAUSE OF DISEASES CLASSIFIED ELSEWHERE: ICD-10-CM

## 2024-06-21 DIAGNOSIS — Z79.899 OTHER LONG TERM (CURRENT) DRUG THERAPY: ICD-10-CM

## 2024-06-21 DIAGNOSIS — G80.9 CEREBRAL PALSY, UNSPECIFIED: ICD-10-CM

## 2024-06-21 DIAGNOSIS — Z66 DO NOT RESUSCITATE: ICD-10-CM

## 2024-06-21 DIAGNOSIS — M81.0 AGE-RELATED OSTEOPOROSIS WITHOUT CURRENT PATHOLOGICAL FRACTURE: ICD-10-CM

## 2024-06-21 DIAGNOSIS — R13.12 DYSPHAGIA, OROPHARYNGEAL PHASE: ICD-10-CM

## 2024-06-21 DIAGNOSIS — L89.520 PRESSURE ULCER OF LEFT ANKLE, UNSTAGEABLE: ICD-10-CM

## 2024-06-21 DIAGNOSIS — A41.9 SEPSIS, UNSPECIFIED ORGANISM: ICD-10-CM

## 2024-06-26 ENCOUNTER — INPATIENT (INPATIENT)
Facility: HOSPITAL | Age: 59
LOS: 13 days | Discharge: HOME | DRG: 872 | End: 2024-07-10
Attending: STUDENT IN AN ORGANIZED HEALTH CARE EDUCATION/TRAINING PROGRAM | Admitting: INTERNAL MEDICINE
Payer: COMMERCIAL

## 2024-06-26 VITALS
SYSTOLIC BLOOD PRESSURE: 107 MMHG | DIASTOLIC BLOOD PRESSURE: 76 MMHG | RESPIRATION RATE: 30 BRPM | HEART RATE: 137 BPM | TEMPERATURE: 102 F | WEIGHT: 90.39 LBS | HEIGHT: 55 IN | OXYGEN SATURATION: 93 %

## 2024-06-26 DIAGNOSIS — Z98.890 OTHER SPECIFIED POSTPROCEDURAL STATES: Chronic | ICD-10-CM

## 2024-06-26 DIAGNOSIS — A41.9 SEPSIS, UNSPECIFIED ORGANISM: ICD-10-CM

## 2024-06-26 DIAGNOSIS — Z93.1 GASTROSTOMY STATUS: Chronic | ICD-10-CM

## 2024-06-26 LAB
ALBUMIN SERPL ELPH-MCNC: 3.6 G/DL — SIGNIFICANT CHANGE UP (ref 3.5–5.2)
ALP SERPL-CCNC: 130 U/L — HIGH (ref 30–115)
ALT FLD-CCNC: 34 U/L — SIGNIFICANT CHANGE UP (ref 0–41)
ANION GAP SERPL CALC-SCNC: 13 MMOL/L — SIGNIFICANT CHANGE UP (ref 7–14)
APPEARANCE UR: ABNORMAL
APTT BLD: 26.7 SEC — LOW (ref 27–39.2)
AST SERPL-CCNC: 36 U/L — SIGNIFICANT CHANGE UP (ref 0–41)
BASOPHILS # BLD AUTO: 0 K/UL — SIGNIFICANT CHANGE UP (ref 0–0.2)
BASOPHILS NFR BLD AUTO: 0 % — SIGNIFICANT CHANGE UP (ref 0–1)
BILIRUB SERPL-MCNC: 0.4 MG/DL — SIGNIFICANT CHANGE UP (ref 0.2–1.2)
BILIRUB UR-MCNC: NEGATIVE — SIGNIFICANT CHANGE UP
BUN SERPL-MCNC: 19 MG/DL — SIGNIFICANT CHANGE UP (ref 10–20)
CALCIUM SERPL-MCNC: 8.9 MG/DL — SIGNIFICANT CHANGE UP (ref 8.4–10.4)
CHLORIDE SERPL-SCNC: 101 MMOL/L — SIGNIFICANT CHANGE UP (ref 98–110)
CO2 SERPL-SCNC: 26 MMOL/L — SIGNIFICANT CHANGE UP (ref 17–32)
COLOR SPEC: YELLOW — SIGNIFICANT CHANGE UP
CREAT SERPL-MCNC: 0.7 MG/DL — SIGNIFICANT CHANGE UP (ref 0.7–1.5)
DIFF PNL FLD: NEGATIVE — SIGNIFICANT CHANGE UP
EGFR: 101 ML/MIN/1.73M2 — SIGNIFICANT CHANGE UP
EOSINOPHIL # BLD AUTO: 0.19 K/UL — SIGNIFICANT CHANGE UP (ref 0–0.7)
EOSINOPHIL NFR BLD AUTO: 0.9 % — SIGNIFICANT CHANGE UP (ref 0–8)
GAS PNL BLDA: SIGNIFICANT CHANGE UP
GLUCOSE SERPL-MCNC: 183 MG/DL — HIGH (ref 70–99)
GLUCOSE UR QL: NEGATIVE MG/DL — SIGNIFICANT CHANGE UP
HCT VFR BLD CALC: 37.7 % — SIGNIFICANT CHANGE UP (ref 37–47)
HGB BLD-MCNC: 12.1 G/DL — SIGNIFICANT CHANGE UP (ref 12–16)
INR BLD: 1.16 RATIO — SIGNIFICANT CHANGE UP (ref 0.65–1.3)
KETONES UR-MCNC: ABNORMAL MG/DL
LACTATE SERPL-SCNC: 2.4 MMOL/L — HIGH (ref 0.7–2)
LEUKOCYTE ESTERASE UR-ACNC: ABNORMAL
LYMPHOCYTES # BLD AUTO: 1.32 K/UL — SIGNIFICANT CHANGE UP (ref 1.2–3.4)
LYMPHOCYTES # BLD AUTO: 6.2 % — LOW (ref 20.5–51.1)
MCHC RBC-ENTMCNC: 29.9 PG — SIGNIFICANT CHANGE UP (ref 27–31)
MCHC RBC-ENTMCNC: 32.1 G/DL — SIGNIFICANT CHANGE UP (ref 32–37)
MCV RBC AUTO: 93.1 FL — SIGNIFICANT CHANGE UP (ref 81–99)
MONOCYTES # BLD AUTO: 0 K/UL — LOW (ref 0.1–0.6)
MONOCYTES NFR BLD AUTO: 0 % — LOW (ref 1.7–9.3)
NEUTROPHILS # BLD AUTO: 19.27 K/UL — HIGH (ref 1.4–6.5)
NEUTROPHILS NFR BLD AUTO: 90.3 % — HIGH (ref 42.2–75.2)
NITRITE UR-MCNC: NEGATIVE — SIGNIFICANT CHANGE UP
PH UR: 7 — SIGNIFICANT CHANGE UP (ref 5–8)
PLATELET # BLD AUTO: 437 K/UL — HIGH (ref 130–400)
PMV BLD: 10.2 FL — SIGNIFICANT CHANGE UP (ref 7.4–10.4)
POTASSIUM SERPL-MCNC: 4.4 MMOL/L — SIGNIFICANT CHANGE UP (ref 3.5–5)
POTASSIUM SERPL-SCNC: 4.4 MMOL/L — SIGNIFICANT CHANGE UP (ref 3.5–5)
PROT SERPL-MCNC: 7.9 G/DL — SIGNIFICANT CHANGE UP (ref 6–8)
PROT UR-MCNC: 100 MG/DL
PROTHROM AB SERPL-ACNC: 13.2 SEC — HIGH (ref 9.95–12.87)
RBC # BLD: 4.05 M/UL — LOW (ref 4.2–5.4)
RBC # FLD: 20.1 % — HIGH (ref 11.5–14.5)
SODIUM SERPL-SCNC: 140 MMOL/L — SIGNIFICANT CHANGE UP (ref 135–146)
SP GR SPEC: >1.03 — HIGH (ref 1–1.03)
UROBILINOGEN FLD QL: 0.2 MG/DL — SIGNIFICANT CHANGE UP (ref 0.2–1)
WBC # BLD: 21.34 K/UL — HIGH (ref 4.8–10.8)
WBC # FLD AUTO: 21.34 K/UL — HIGH (ref 4.8–10.8)

## 2024-06-26 PROCEDURE — 87086 URINE CULTURE/COLONY COUNT: CPT

## 2024-06-26 PROCEDURE — 80048 BASIC METABOLIC PNL TOTAL CA: CPT

## 2024-06-26 PROCEDURE — 81001 URINALYSIS AUTO W/SCOPE: CPT

## 2024-06-26 PROCEDURE — 86850 RBC ANTIBODY SCREEN: CPT

## 2024-06-26 PROCEDURE — 0241U: CPT

## 2024-06-26 PROCEDURE — 94660 CPAP INITIATION&MGMT: CPT

## 2024-06-26 PROCEDURE — 85014 HEMATOCRIT: CPT

## 2024-06-26 PROCEDURE — 85018 HEMOGLOBIN: CPT

## 2024-06-26 PROCEDURE — 84295 ASSAY OF SERUM SODIUM: CPT

## 2024-06-26 PROCEDURE — 99291 CRITICAL CARE FIRST HOUR: CPT

## 2024-06-26 PROCEDURE — 86901 BLOOD TYPING SEROLOGIC RH(D): CPT

## 2024-06-26 PROCEDURE — 82803 BLOOD GASES ANY COMBINATION: CPT

## 2024-06-26 PROCEDURE — 82962 GLUCOSE BLOOD TEST: CPT

## 2024-06-26 PROCEDURE — 36415 COLL VENOUS BLD VENIPUNCTURE: CPT

## 2024-06-26 PROCEDURE — 94760 N-INVAS EAR/PLS OXIMETRY 1: CPT

## 2024-06-26 PROCEDURE — 87040 BLOOD CULTURE FOR BACTERIA: CPT

## 2024-06-26 PROCEDURE — 71045 X-RAY EXAM CHEST 1 VIEW: CPT | Mod: 26

## 2024-06-26 PROCEDURE — 70450 CT HEAD/BRAIN W/O DYE: CPT | Mod: MC

## 2024-06-26 PROCEDURE — 93005 ELECTROCARDIOGRAM TRACING: CPT

## 2024-06-26 PROCEDURE — 83605 ASSAY OF LACTIC ACID: CPT

## 2024-06-26 PROCEDURE — 80053 COMPREHEN METABOLIC PANEL: CPT

## 2024-06-26 PROCEDURE — 74018 RADEX ABDOMEN 1 VIEW: CPT

## 2024-06-26 PROCEDURE — 86900 BLOOD TYPING SEROLOGIC ABO: CPT

## 2024-06-26 PROCEDURE — 83735 ASSAY OF MAGNESIUM: CPT

## 2024-06-26 PROCEDURE — 85379 FIBRIN DEGRADATION QUANT: CPT

## 2024-06-26 PROCEDURE — 82330 ASSAY OF CALCIUM: CPT

## 2024-06-26 PROCEDURE — 93010 ELECTROCARDIOGRAM REPORT: CPT

## 2024-06-26 PROCEDURE — 93970 EXTREMITY STUDY: CPT

## 2024-06-26 PROCEDURE — 84132 ASSAY OF SERUM POTASSIUM: CPT

## 2024-06-26 PROCEDURE — 85025 COMPLETE CBC W/AUTO DIFF WBC: CPT

## 2024-06-26 PROCEDURE — 85027 COMPLETE CBC AUTOMATED: CPT

## 2024-06-26 PROCEDURE — 71275 CT ANGIOGRAPHY CHEST: CPT | Mod: MC

## 2024-06-26 PROCEDURE — 94640 AIRWAY INHALATION TREATMENT: CPT

## 2024-06-26 PROCEDURE — 71045 X-RAY EXAM CHEST 1 VIEW: CPT

## 2024-06-26 PROCEDURE — 97167 OT EVAL HIGH COMPLEX 60 MIN: CPT | Mod: GO

## 2024-06-26 PROCEDURE — 80177 DRUG SCRN QUAN LEVETIRACETAM: CPT

## 2024-06-26 PROCEDURE — 84145 PROCALCITONIN (PCT): CPT

## 2024-06-26 RX ORDER — LORAZEPAM 0.5 MG
4 TABLET ORAL ONCE
Refills: 0 | Status: DISCONTINUED | OUTPATIENT
Start: 2024-06-26 | End: 2024-06-26

## 2024-06-26 RX ORDER — PIPERACILLIN SODIUM AND TAZOBACTAM SODIUM 3; .375 G/15ML; G/15ML
3.38 INJECTION, POWDER, LYOPHILIZED, FOR SOLUTION INTRAVENOUS ONCE
Refills: 0 | Status: COMPLETED | OUTPATIENT
Start: 2024-06-27 | End: 2024-06-27

## 2024-06-26 RX ORDER — PIPERACILLIN SODIUM AND TAZOBACTAM SODIUM 3; .375 G/15ML; G/15ML
3.38 INJECTION, POWDER, LYOPHILIZED, FOR SOLUTION INTRAVENOUS ONCE
Refills: 0 | Status: COMPLETED | OUTPATIENT
Start: 2024-06-26 | End: 2024-06-26

## 2024-06-26 RX ORDER — ENOXAPARIN SODIUM 100 MG/ML
30 INJECTION SUBCUTANEOUS EVERY 24 HOURS
Refills: 0 | Status: DISCONTINUED | OUTPATIENT
Start: 2024-06-26 | End: 2024-07-10

## 2024-06-26 RX ORDER — ONDANSETRON HYDROCHLORIDE 2 MG/ML
4 INJECTION INTRAMUSCULAR; INTRAVENOUS EVERY 8 HOURS
Refills: 0 | Status: DISCONTINUED | OUTPATIENT
Start: 2024-06-26 | End: 2024-07-10

## 2024-06-26 RX ORDER — MAGNESIUM, ALUMINUM HYDROXIDE 400-400
30 TABLET,CHEWABLE ORAL EVERY 4 HOURS
Refills: 0 | Status: DISCONTINUED | OUTPATIENT
Start: 2024-06-26 | End: 2024-07-10

## 2024-06-26 RX ORDER — DOXYCYCLINE 100 MG/1
100 CAPSULE ORAL EVERY 12 HOURS
Refills: 0 | Status: DISCONTINUED | OUTPATIENT
Start: 2024-06-26 | End: 2024-06-29

## 2024-06-26 RX ORDER — ACETAMINOPHEN 325 MG
650 TABLET ORAL EVERY 6 HOURS
Refills: 0 | Status: DISCONTINUED | OUTPATIENT
Start: 2024-06-26 | End: 2024-07-10

## 2024-06-26 RX ORDER — PIPERACILLIN SODIUM AND TAZOBACTAM SODIUM 3; .375 G/15ML; G/15ML
3.38 INJECTION, POWDER, LYOPHILIZED, FOR SOLUTION INTRAVENOUS EVERY 8 HOURS
Refills: 0 | Status: DISCONTINUED | OUTPATIENT
Start: 2024-06-27 | End: 2024-06-27

## 2024-06-26 RX ORDER — NOREPINEPHRINE BITARTRATE 1 MG/ML
0.05 INJECTION INTRAVENOUS
Qty: 8 | Refills: 0 | Status: DISCONTINUED | OUTPATIENT
Start: 2024-06-26 | End: 2024-06-26

## 2024-06-26 RX ORDER — MIDODRINE HYDROCHLORIDE 10 MG/1
10 TABLET ORAL EVERY 8 HOURS
Refills: 0 | Status: DISCONTINUED | OUTPATIENT
Start: 2024-06-26 | End: 2024-07-10

## 2024-06-26 RX ORDER — NOREPINEPHRINE BITARTRATE 1 MG/ML
0.05 INJECTION INTRAVENOUS
Qty: 8 | Refills: 0 | Status: DISCONTINUED | OUTPATIENT
Start: 2024-06-26 | End: 2024-06-28

## 2024-06-26 RX ORDER — DEXTROSE MONOHYDRATE AND SODIUM CHLORIDE 5; .3 G/100ML; G/100ML
1250 INJECTION, SOLUTION INTRAVENOUS ONCE
Refills: 0 | Status: COMPLETED | OUTPATIENT
Start: 2024-06-26 | End: 2024-06-26

## 2024-06-26 RX ORDER — ACETAMINOPHEN 325 MG
650 TABLET ORAL ONCE
Refills: 0 | Status: COMPLETED | OUTPATIENT
Start: 2024-06-26 | End: 2024-06-26

## 2024-06-26 RX ORDER — VANCOMYCIN HYDROCHLORIDE 50 MG/ML
1000 KIT ORAL ONCE
Refills: 0 | Status: COMPLETED | OUTPATIENT
Start: 2024-06-26 | End: 2024-06-26

## 2024-06-26 RX ORDER — CEFEPIME 1 G/1
2000 INJECTION, POWDER, FOR SOLUTION INTRAMUSCULAR; INTRAVENOUS ONCE
Refills: 0 | Status: COMPLETED | OUTPATIENT
Start: 2024-06-26 | End: 2024-06-26

## 2024-06-26 RX ORDER — PANTOPRAZOLE SODIUM 40 MG/10ML
40 INJECTION, POWDER, FOR SOLUTION INTRAVENOUS DAILY
Refills: 0 | Status: DISCONTINUED | OUTPATIENT
Start: 2024-06-26 | End: 2024-07-10

## 2024-06-26 RX ORDER — LEVETIRACETAM 100 MG/ML
1000 INJECTION INTRAVENOUS ONCE
Refills: 0 | Status: COMPLETED | OUTPATIENT
Start: 2024-06-26 | End: 2024-06-26

## 2024-06-26 RX ADMIN — MIDODRINE HYDROCHLORIDE 10 MILLIGRAM(S): 10 TABLET ORAL at 22:21

## 2024-06-26 RX ADMIN — LEVETIRACETAM 400 MILLIGRAM(S): 100 INJECTION INTRAVENOUS at 22:10

## 2024-06-26 RX ADMIN — DEXTROSE MONOHYDRATE AND SODIUM CHLORIDE 1250 MILLILITER(S): 5; .3 INJECTION, SOLUTION INTRAVENOUS at 19:14

## 2024-06-26 RX ADMIN — DEXTROSE MONOHYDRATE AND SODIUM CHLORIDE 1250 MILLILITER(S): 5; .3 INJECTION, SOLUTION INTRAVENOUS at 22:00

## 2024-06-26 RX ADMIN — VANCOMYCIN HYDROCHLORIDE 250 MILLIGRAM(S): KIT at 19:14

## 2024-06-26 RX ADMIN — Medication 4 MILLIGRAM(S): at 22:35

## 2024-06-26 RX ADMIN — CEFEPIME 100 MILLIGRAM(S): 1 INJECTION, POWDER, FOR SOLUTION INTRAMUSCULAR; INTRAVENOUS at 19:15

## 2024-06-26 RX ADMIN — Medication 650 MILLIGRAM(S): at 19:15

## 2024-06-26 RX ADMIN — PIPERACILLIN SODIUM AND TAZOBACTAM SODIUM 200 GRAM(S): 3; .375 INJECTION, POWDER, LYOPHILIZED, FOR SOLUTION INTRAVENOUS at 22:21

## 2024-06-26 NOTE — ED PROVIDER NOTE - CLINICAL SUMMARY MEDICAL DECISION MAKING FREE TEXT BOX
Independently interpretted by Michael Poe DO:  XR interpretation: No cardiopulmonary disease,   EKG interpretation: no IRENE, NSR    will admit for respiratory distress, requiring BIPAP, sepsis abx, cultures.     Appropriate medications for patient's presenting complaints were ordered and effects were reassessed. Patient's external records were reviewed    Escalation to admission and/or observation was considered.  Patient feels much better and is comfortable with discharge.  Appropriate follow-up was arranged.

## 2024-06-26 NOTE — H&P ADULT - NSHPPHYSICALEXAM_GEN_ALL_CORE
Physical Examination   CONSTITUTIONAL: Ill looking  NEUROLOGICAL: Non-verbal at baseline  EYES: Pupils equal, Round and reactive to light.  CARDIAC: Normal rate, Regular rhythm.    RESPIRATORY: No wheezing, Bilateral crackles +nt, Tachypneic on AVAPS  GASTROINTESTINAL: Abdomen soft, Non-tender, No guarding, + BS  EXTREMITIES:  2+ Peripheral Pulses, No clubbing, cyanosis, or edema

## 2024-06-26 NOTE — ED PROVIDER NOTE - PHYSICAL EXAMINATION
CONSTITUTIONAL: Well-developed; well-nourished  SKIN: cool, diapheretic  HEAD: Normocephalic; atraumatic.  EYES: PERRL, EOMI, no conjunctival erythema  ENT: No nasal discharge; airway clear.  NECK: Supple; non tender.  CARD Tachy rate and rhythm.   RESP: Crackles bilaterally   ABD: soft ntnd  PSYCH: non verbal

## 2024-06-26 NOTE — H&P ADULT - NSHPPOADEEPVENOUSTHROMB_GEN_A_CORE
no

## 2024-06-26 NOTE — ED PROVIDER NOTE - ATTENDING CONTRIBUTION TO CARE
pt with PMH as above DNR DNI nonverbal pw shortness of breath, febrile to 102    sepsis abx, fluids, cultures, lactate.   BIPAP AVAPS

## 2024-06-26 NOTE — H&P ADULT - HISTORY OF PRESENT ILLNESS
57 yo F with hx of down syndrome, anemia, cerebral palsy, seizure disorder, osteoporosis, hypotension on midodrine, nonverbal at baseline who was BIBEMS from group home for respiratory distress with complaints of fever and hypoxia. Per EMS, pt was hypoxic to 80s and was put on non-rebreather mask. Pt was admitted recently from 5/31 - 6/13 for septic shock and acute hypoxic and hypercapnic respiratory failure 2/2 recurrent CAP. On arrival patient was tachycardic to 137. was febrile to 102.4 F and was tachypneic to 30s. The patient was put on AVAPS with patient saturating 92 %, was given STAT dose of cefepime and vancomycin, got 1250 cc of bolus (30 cc/kg per sepsis protocol).  ABG was done which showed pH of 7.4 with pO2 of 81 and Pco2 of 42 with lactate of 2.8.     ED Course  Vitals: 102.4 F, RR 30 bpm, , 107/67, 93 % on AVAPS  Labs: WBC 21K, , Lactate 2.4, UA Turbid with Large LE, 64 WBC and Yeast like cells +nt  Imaging: CXR showing LLL infiltrate  Medications: Cefepime, Vancomycin and 1.2 L bolus    Patient being admitted to medicine for further evaluation and managment of severe sepsis.

## 2024-06-26 NOTE — H&P ADULT - ATTENDING COMMENTS
My note supersedes all residents notes that I sign, My correction for their notes are in my notes   I evaluated the Pt once she was assigned to me now   Staff from  at bedside   she is in CC 03 in the ED, discussed and evaluated with the morning team   On exam  General: non verbal, non responsive, not following commands  on NIV MASK  Lungs: Scattered rhonchi, decrease breath sound on LF  ,Tachypneic   Heart: regular rhythm, on Levophed    Abdomen: soft, non tender non distended  Ext: no edema, contracted       Acute Hypoxemic Respiratory Failure /PNA possible aspiration/ recurrent admission  Sepsis POA- now with septic shock on Levophed   Possible UTI   Hx Bacteremia with ESBL Proteus and VR E fecalis and fecium  Hx of Down Syndrome  Hx of Cerebral Palsy/ Nonverbal at baseline  Hx of Seizures  Hx of  DVT  Hx of  GI bleed  Hx of  OM  Hx of  duodenal perforation     Pt is admitted to SDU   Pulm cc input appreciated   Get STAT LABS , ABG/Lactate , trend lactate   Give another bolus LR and c/w IVF LR   0.08 levophed , wean as tolerated   Recent ECHO with normal EF, goal directed fluid resuscitation  also on midodrine    UA: Large LE 64 WBC, Yeast like cells +nt.    F/u Urine culture, f/u Blood culture  GET RVP,  Procalcitonin/ MRSA, Fungitell, urine strep and legionella and sputum cultures   ID consult for eval and approval of  meropenem and lenizolide and ? antifungal   for now    Strict In/out, bladder scans and straight cath if retaining then  place Madsen if recurrent and send new UA / cultures after Madsen   monitor CBC ,CMP   Keep off sedatives, c/w Keppra home dose. Was given STAT 1g Keppra and 4 mg Ativan for witnessed seizure in ED, f/u Keppra levels,  hold  PEG feeding for now   frequent turning, off loading and positioning and skin care as per protocol, Maintain pressure injury prevention, Keep skin clean, Offload heels, Monitor skin for wound or changes and notify provider if any     CTA reported Diffuse, near complete left lower lobe and upper lobe mixed consolidation and atelectasis, with some remaining aeration to left upper   lobe. Occlusion of right mainstem bronchus; correlation for aspiration suggested. 2. Near complete resolution of right lung airspace opacification, with   few residual likely inflammatory/infectious ground-glass airspace opacities. 3. No evidence of acute pulmonary embolism.    c/w  DVT prophylaxis Lovenox for now and check LE  duplex    ENDOCRINE:  Follow up FS.  Insulin protocol if needed.    MUSCULOSKELETAL: Bedrest    SDU low threshold for ICU UPGRADE - PULIVETTE CC ON BOARD   OVER ALL POOR PROGNOSIS  DNR/I asper MOLST COPY IN 2/2024 - Palliative team consult My note supersedes all residents notes that I sign, My correction for their notes are in my notes   I evaluated the Pt once she was assigned to me now   Staff from  at bedside   she is in CC 03 in the ED, discussed and evaluated with the morning team   On exam  General: non verbal, non responsive, not following commands  on NIV MASK  Lungs: Scattered rhonchi, decrease breath sound on LF  ,Tachypneic   Heart: regular rhythm, on Levophed    Abdomen: soft, non tender non distended  Ext: no edema, contracted       Acute Hypoxemic Respiratory Failure /PNA possible aspiration/ recurrent admission  Sepsis POA- now with septic shock on Levophed   Possible UTI   Hx Bacteremia with ESBL Proteus and VR E fecalis and fecium  Hx of Down Syndrome  Hx of Cerebral Palsy/ Nonverbal at baseline  Hx of Seizures  Hx of  DVT  Hx of  GI bleed  Hx of  OM  Hx of  duodenal perforation     Pt is admitted to SDU   Pulm cc input appreciated   Get STAT LABS , ABG/Lactate  and troponin , trend lactate   Give another bolus LR and c/w IVF LR   0.08 levophed , wean as tolerated   Recent ECHO with normal EF, goal directed fluid resuscitation  also on midodrine    UA: Large LE 64 WBC, Yeast like cells +nt.    F/u Urine culture, f/u Blood culture  GET RVP,  Procalcitonin/ MRSA, Fungitell, urine strep and legionella and sputum cultures   ID consult for eval and approval of  meropenem and lenizolide and ? antifungal   for now    Strict In/out, bladder scans and straight cath if retaining then  place Madsen if recurrent and send new UA / cultures after Madsen   monitor CBC ,CMP   Keep off sedatives, c/w Keppra home dose. Was given STAT 1g Keppra and 4 mg Ativan for witnessed seizure in ED, f/u Keppra levels,  hold  PEG feeding for now   frequent turning, off loading and positioning and skin care as per protocol, Maintain pressure injury prevention, Keep skin clean, Offload heels, Monitor skin for wound or changes and notify provider if any     CTA reported Diffuse, near complete left lower lobe and upper lobe mixed consolidation and atelectasis, with some remaining aeration to left upper   lobe. Occlusion of right mainstem bronchus; correlation for aspiration suggested. 2. Near complete resolution of right lung airspace opacification, with   few residual likely inflammatory/infectious ground-glass airspace opacities. 3. No evidence of acute pulmonary embolism.    c/w  DVT prophylaxis Lovenox for now and check LE  duplex    ENDOCRINE:  Follow up FS.  Insulin protocol if needed.    MUSCULOSKELETAL: Bedrest    SDU low threshold for ICU UPGRADE - PULMM CC ON BOARD   OVER ALL POOR PROGNOSIS  DNR/I asper MOLST COPY IN 2/2024 - Palliative team consult

## 2024-06-26 NOTE — ED ADULT NURSE NOTE - NSFALLHARMRISKINTERV_ED_ALL_ED
Communicate risk of Fall with Harm to all staff, patient, and family/Provide patient with walking aids/Provide visual cue: red socks, yellow wristband, yellow gown, etc/Reinforce activity limits and safety measures with patient and family/Bed in lowest position, wheels locked, appropriate side rails in place/Call bell, personal items and telephone in reach/Instruct patient to call for assistance before getting out of bed/chair/stretcher/Non-slip footwear applied when patient is off stretcher/[place] to call system/Physically safe environment - no spills, clutter or unnecessary equipment/Purposeful Proactive Rounding/Room/bathroom lighting operational, light cord in reach

## 2024-06-26 NOTE — H&P ADULT - ASSESSMENT
IMPRESSION:    Acute Hypoxemic Respiratory Failure   Sepsis POA  Bilateral Basilar Pneumonia  Hx ESBL Proteus and VR E fecalis and fecium  Hx of Down Syndrome  Hx of Cerebral Palsy/ Nonverbal at baseline  Hx of Seizures  Hx of OP      PLAN:    CNS: Keep off sedatives, c/w Keppra home dose. Was given STAT 1g Keppra and 4 mg Ativan for witnessed seizure in ED, f/u Keppra levels, f/u Neurology    HEENT: Oral Care    PULMONARY: HOB @ 45, aspiration precautions. c/w AVAPS , Keep spo2 92 TO 96%. Chest PT. Duonebs q6hrs and PRN.    CARDIOVASCULAR: Recent ECHO with normal EF, goal directed fluid resuscitation, Keep MAP > 65  Midodrine 10 mg Q8 hrs     GI: GI prophylaxis. PEG feeding with Jevity 1.2    RENAL: Strict In/out, Trend CMP, UA: Large LE 64 WBC, Yeast like cells +nt.    INFECTIOUS DISEASE: c/w Zosyn and Doxycycline. f/u Lactate 2.4 ->  ,f/u Urine culture, f/u Blood culture, f/u RVP, f/u Procalcitonin, f/u ID, f/u MRSA, f/u Fungitell    HEMATOLOGICAL:  DVT prophylaxis Lovenox, f/u d-dimer, f/u duplex    ENDOCRINE:  Follow up FS.  Insulin protocol if needed.    MUSCULOSKELETAL: Bedrest    SDU  POOR PROGNOSIS  DNR/I   IMPRESSION:    Acute Hypoxemic Respiratory Failure   Sepsis POA  Bilateral Basilar Pneumonia  Hx ESBL Proteus and VR E fecalis and fecium  Hx of Down Syndrome  Hx of Cerebral Palsy/ Nonverbal at baseline  Hx of Seizures  Hx of OP      PLAN:    CNS: Keep off sedatives, c/w Keppra home dose. Was given STAT 1g Keppra and 4 mg Ativan for witnessed seizure in ED, f/u Keppra levels,    HEENT: Oral Care    PULMONARY: HOB @ 45, aspiration precautions. c/w AVAPS , Keep spo2 92 TO 96%. Chest PT. Duonebs q6hrs and PRN.    CARDIOVASCULAR: Recent ECHO with normal EF, goal directed fluid resuscitation, Keep MAP > 65  Midodrine 10 mg Q8 hrs     GI: GI prophylaxis. PEG feeding with Jevity 1.2    RENAL: Strict In/out, Trend CMP, UA: Large LE 64 WBC, Yeast like cells +nt.    INFECTIOUS DISEASE: c/w Zosyn and Doxycycline. f/u Lactate 2.4 ->  ,f/u Urine culture, f/u Blood culture, f/u RVP, f/u Procalcitonin, f/u ID, f/u MRSA, f/u Fungitell    HEMATOLOGICAL:  DVT prophylaxis Lovenox, f/u d-dimer, f/u duplex    ENDOCRINE:  Follow up FS.  Insulin protocol if needed.    MUSCULOSKELETAL: Bedrest    SDU  POOR PROGNOSIS  DNR/I

## 2024-06-26 NOTE — ED PROVIDER NOTE - OBJECTIVE STATEMENT
59 yo F with hx of down syndrome, anemia, cerebral palsy, seizure, osteoporosis, hypotension on midodrine, nonverbal at baseline who was BIBEMS from [facility] \For rule out sepsis with hypoxia tachycardia and fevers. Patient nonverbal at baseline, hard to gather more information from patient. EMS said that she was tachycardic to 130s and hypoxic to the 80s when they placed her on nonrebreather. Blood pressure in the low 100s.

## 2024-06-26 NOTE — ED ADULT TRIAGE NOTE - CHIEF COMPLAINT QUOTE
pt BIBEMS from group home for r/o sepsis, pt presents with sob, hypoxia, fevers and tachycardia. Pt non-verbal bedbound at baseline.

## 2024-06-27 DIAGNOSIS — J96.01 ACUTE RESPIRATORY FAILURE WITH HYPOXIA: ICD-10-CM

## 2024-06-27 DIAGNOSIS — A41.9 SEPSIS, UNSPECIFIED ORGANISM: ICD-10-CM

## 2024-06-27 DIAGNOSIS — Z71.89 OTHER SPECIFIED COUNSELING: ICD-10-CM

## 2024-06-27 DIAGNOSIS — Z51.5 ENCOUNTER FOR PALLIATIVE CARE: ICD-10-CM

## 2024-06-27 PROBLEM — G80.9 CEREBRAL PALSY, UNSPECIFIED: Chronic | Status: ACTIVE | Noted: 2024-05-31

## 2024-06-27 PROBLEM — R47.01 APHASIA: Chronic | Status: ACTIVE | Noted: 2024-05-31

## 2024-06-27 PROBLEM — I95.9 HYPOTENSION, UNSPECIFIED: Chronic | Status: ACTIVE | Noted: 2024-05-31

## 2024-06-27 PROBLEM — R56.9 UNSPECIFIED CONVULSIONS: Chronic | Status: ACTIVE | Noted: 2024-05-31

## 2024-06-27 LAB
FLUAV AG NPH QL: SIGNIFICANT CHANGE UP
FLUBV AG NPH QL: SIGNIFICANT CHANGE UP
GAS PNL BLDA: SIGNIFICANT CHANGE UP
LACTATE SERPL-SCNC: 3.9 MMOL/L — HIGH (ref 0.7–2)
LACTATE SERPL-SCNC: 5.4 MMOL/L — CRITICAL HIGH (ref 0.7–2)
RSV RNA NPH QL NAA+NON-PROBE: SIGNIFICANT CHANGE UP
SARS-COV-2 RNA SPEC QL NAA+PROBE: SIGNIFICANT CHANGE UP

## 2024-06-27 PROCEDURE — 93010 ELECTROCARDIOGRAM REPORT: CPT | Mod: 77

## 2024-06-27 PROCEDURE — 99291 CRITICAL CARE FIRST HOUR: CPT

## 2024-06-27 PROCEDURE — 93010 ELECTROCARDIOGRAM REPORT: CPT

## 2024-06-27 PROCEDURE — 70450 CT HEAD/BRAIN W/O DYE: CPT | Mod: 26

## 2024-06-27 PROCEDURE — 99223 1ST HOSP IP/OBS HIGH 75: CPT

## 2024-06-27 PROCEDURE — 71275 CT ANGIOGRAPHY CHEST: CPT | Mod: 26

## 2024-06-27 RX ORDER — IPRATROPIUM BROMIDE AND ALBUTEROL SULFATE .5; 3 MG/3ML; MG/3ML
3 SOLUTION RESPIRATORY (INHALATION) EVERY 6 HOURS
Refills: 0 | Status: DISCONTINUED | OUTPATIENT
Start: 2024-06-27 | End: 2024-07-10

## 2024-06-27 RX ORDER — LEVETIRACETAM 100 MG/ML
750 INJECTION INTRAVENOUS
Refills: 0 | Status: DISCONTINUED | OUTPATIENT
Start: 2024-06-27 | End: 2024-07-01

## 2024-06-27 RX ORDER — SENNOSIDES 8.6 MG
2 TABLET ORAL AT BEDTIME
Refills: 0 | Status: DISCONTINUED | OUTPATIENT
Start: 2024-06-27 | End: 2024-06-29

## 2024-06-27 RX ORDER — LORAZEPAM 0.5 MG
2 TABLET ORAL ONCE
Refills: 0 | Status: DISCONTINUED | OUTPATIENT
Start: 2024-06-27 | End: 2024-06-27

## 2024-06-27 RX ORDER — LINEZOLID 600 MG/1
600 TABLET, FILM COATED ORAL EVERY 12 HOURS
Refills: 0 | Status: DISCONTINUED | OUTPATIENT
Start: 2024-06-28 | End: 2024-07-02

## 2024-06-27 RX ORDER — MEROPENEM 500 MG/1
1000 INJECTION, POWDER, FOR SOLUTION INTRAVENOUS EVERY 8 HOURS
Refills: 0 | Status: COMPLETED | OUTPATIENT
Start: 2024-06-27 | End: 2024-07-05

## 2024-06-27 RX ORDER — LINEZOLID 600 MG/1
600 TABLET, FILM COATED ORAL ONCE
Refills: 0 | Status: COMPLETED | OUTPATIENT
Start: 2024-06-27 | End: 2024-06-27

## 2024-06-27 RX ORDER — GABAPENTIN
300 POWDER (GRAM) MISCELLANEOUS
Refills: 0 | Status: DISCONTINUED | OUTPATIENT
Start: 2024-06-27 | End: 2024-07-10

## 2024-06-27 RX ORDER — RALOXIFENE HYDROCHLORIDE 60 MG/1
60 TABLET, FILM COATED ORAL DAILY
Refills: 0 | Status: DISCONTINUED | OUTPATIENT
Start: 2024-06-27 | End: 2024-07-10

## 2024-06-27 RX ORDER — MEROPENEM 500 MG/1
1000 INJECTION, POWDER, FOR SOLUTION INTRAVENOUS ONCE
Refills: 0 | Status: COMPLETED | OUTPATIENT
Start: 2024-06-27 | End: 2024-06-27

## 2024-06-27 RX ORDER — DEXTROSE MONOHYDRATE AND SODIUM CHLORIDE 5; .3 G/100ML; G/100ML
500 INJECTION, SOLUTION INTRAVENOUS ONCE
Refills: 0 | Status: COMPLETED | OUTPATIENT
Start: 2024-06-27 | End: 2024-06-27

## 2024-06-27 RX ORDER — MEROPENEM 500 MG/1
INJECTION, POWDER, FOR SOLUTION INTRAVENOUS
Refills: 0 | Status: COMPLETED | OUTPATIENT
Start: 2024-06-27 | End: 2024-07-05

## 2024-06-27 RX ORDER — LINEZOLID 600 MG/1
TABLET, FILM COATED ORAL
Refills: 0 | Status: DISCONTINUED | OUTPATIENT
Start: 2024-06-27 | End: 2024-07-02

## 2024-06-27 RX ORDER — CALCIUM CARBONATE 500(1250)
1 TABLET,CHEWABLE ORAL DAILY
Refills: 0 | Status: DISCONTINUED | OUTPATIENT
Start: 2024-06-27 | End: 2024-07-10

## 2024-06-27 RX ADMIN — PIPERACILLIN SODIUM AND TAZOBACTAM SODIUM 3.38 GRAM(S): 3; .375 INJECTION, POWDER, LYOPHILIZED, FOR SOLUTION INTRAVENOUS at 12:11

## 2024-06-27 RX ADMIN — NOREPINEPHRINE BITARTRATE 3.84 MICROGRAM(S)/KG/MIN: 1 INJECTION INTRAVENOUS at 02:50

## 2024-06-27 RX ADMIN — Medication 650 MILLIGRAM(S): at 18:28

## 2024-06-27 RX ADMIN — MIDODRINE HYDROCHLORIDE 10 MILLIGRAM(S): 10 TABLET ORAL at 13:22

## 2024-06-27 RX ADMIN — LEVETIRACETAM 750 MILLIGRAM(S): 100 INJECTION INTRAVENOUS at 06:24

## 2024-06-27 RX ADMIN — DEXTROSE MONOHYDRATE AND SODIUM CHLORIDE 1000 MILLILITER(S): 5; .3 INJECTION, SOLUTION INTRAVENOUS at 12:15

## 2024-06-27 RX ADMIN — IPRATROPIUM BROMIDE AND ALBUTEROL SULFATE 3 MILLILITER(S): .5; 3 SOLUTION RESPIRATORY (INHALATION) at 08:06

## 2024-06-27 RX ADMIN — IPRATROPIUM BROMIDE AND ALBUTEROL SULFATE 3 MILLILITER(S): .5; 3 SOLUTION RESPIRATORY (INHALATION) at 01:50

## 2024-06-27 RX ADMIN — PANTOPRAZOLE SODIUM 40 MILLIGRAM(S): 40 INJECTION, POWDER, FOR SOLUTION INTRAVENOUS at 12:12

## 2024-06-27 RX ADMIN — RALOXIFENE HYDROCHLORIDE 60 MILLIGRAM(S): 60 TABLET, FILM COATED ORAL at 12:12

## 2024-06-27 RX ADMIN — Medication 3 MILLIGRAM(S): at 21:37

## 2024-06-27 RX ADMIN — PIPERACILLIN SODIUM AND TAZOBACTAM SODIUM 25 GRAM(S): 3; .375 INJECTION, POWDER, LYOPHILIZED, FOR SOLUTION INTRAVENOUS at 08:06

## 2024-06-27 RX ADMIN — LINEZOLID 300 MILLIGRAM(S): 600 TABLET, FILM COATED ORAL at 16:24

## 2024-06-27 RX ADMIN — Medication 300 MILLIGRAM(S): at 17:00

## 2024-06-27 RX ADMIN — IPRATROPIUM BROMIDE AND ALBUTEROL SULFATE 3 MILLILITER(S): .5; 3 SOLUTION RESPIRATORY (INHALATION) at 16:27

## 2024-06-27 RX ADMIN — Medication 650 MILLIGRAM(S): at 06:24

## 2024-06-27 RX ADMIN — IPRATROPIUM BROMIDE AND ALBUTEROL SULFATE 3 MILLILITER(S): .5; 3 SOLUTION RESPIRATORY (INHALATION) at 20:28

## 2024-06-27 RX ADMIN — MEROPENEM 100 MILLIGRAM(S): 500 INJECTION, POWDER, FOR SOLUTION INTRAVENOUS at 15:44

## 2024-06-27 RX ADMIN — DOXYCYCLINE 100 MILLIGRAM(S): 100 CAPSULE ORAL at 06:43

## 2024-06-27 RX ADMIN — MIDODRINE HYDROCHLORIDE 10 MILLIGRAM(S): 10 TABLET ORAL at 21:51

## 2024-06-27 RX ADMIN — DEXTROSE MONOHYDRATE AND SODIUM CHLORIDE 1000 MILLILITER(S): 5; .3 INJECTION, SOLUTION INTRAVENOUS at 01:21

## 2024-06-27 RX ADMIN — MEROPENEM 100 MILLIGRAM(S): 500 INJECTION, POWDER, FOR SOLUTION INTRAVENOUS at 21:50

## 2024-06-27 RX ADMIN — PIPERACILLIN SODIUM AND TAZOBACTAM SODIUM 25 GRAM(S): 3; .375 INJECTION, POWDER, LYOPHILIZED, FOR SOLUTION INTRAVENOUS at 13:22

## 2024-06-27 RX ADMIN — LEVETIRACETAM 750 MILLIGRAM(S): 100 INJECTION INTRAVENOUS at 17:01

## 2024-06-27 RX ADMIN — Medication 300 MILLIGRAM(S): at 06:12

## 2024-06-27 RX ADMIN — Medication 1 TABLET(S): at 12:12

## 2024-06-27 RX ADMIN — Medication 2 MILLIGRAM(S): at 15:53

## 2024-06-27 RX ADMIN — PIPERACILLIN SODIUM AND TAZOBACTAM SODIUM 25 GRAM(S): 3; .375 INJECTION, POWDER, LYOPHILIZED, FOR SOLUTION INTRAVENOUS at 01:50

## 2024-06-27 RX ADMIN — DOXYCYCLINE 100 MILLIGRAM(S): 100 CAPSULE ORAL at 17:00

## 2024-06-27 RX ADMIN — MIDODRINE HYDROCHLORIDE 10 MILLIGRAM(S): 10 TABLET ORAL at 06:12

## 2024-06-27 NOTE — CONSULT NOTE ADULT - ASSESSMENT
58F with PMH including CP, anemia, Down's syndrome, seizures, OP, hypotension, nonverbal at abseline, here with respiratory distress, and found to have acute hypoxic respiratory failure from PNA, possibly from aspiration, admitted to medicine stepdown on pressors and IV abx. Patient has had frequent admissions for similar issues (this is her 9th admission in 12 months). She resides at a group home, and is DNR/DNI. Palliative consulted for [facility].

## 2024-06-27 NOTE — PATIENT PROFILE ADULT - FALL HARM RISK - HARM RISK INTERVENTIONS

## 2024-06-27 NOTE — PATIENT PROFILE ADULT - FUNCTIONAL ASSESSMENT - BASIC MOBILITY 6.
1-calculated by average/Not able to assess (calculate score using [facility] averaging method)

## 2024-06-27 NOTE — PROGRESS NOTE ADULT - SUBJECTIVE AND OBJECTIVE BOX
Patient is a 58y old Female who presents with a chief complaint of Sepsis and Acute respiratory failure with hypoxia. Today is hospital day 1d. This morning patient was seen and examined at bedside with AVAPS for airway mngmnt.   No acute or major events overnight.    Code Status: DNR/I per MOLST COPY IN 2/2024     PAST MEDICAL & SURGICAL HISTORY  Down syndrome    Osteoporosis    Mild anemia    Neuropathy    Cerebral palsy    Seizure    Nonverbal    Hypotension  on midodrine    S/P debridement  of R hip on 3/2/21    S/P percutaneous endoscopic gastrostomy (PEG) tube placement      SOCIAL HISTORY:  Social History: lives at [facility]      ALLERGIES:  chlorhexidine containing compounds (Rash (Mild to Mod))    MEDICATIONS:  STANDING MEDICATIONS  albuterol/ipratropium for Nebulization 3 milliLiter(s) Nebulizer every 6 hours  calcium carbonate   1250 mG (OsCal) 1 Tablet(s) Oral daily  doxycycline IVPB 100 milliGRAM(s) IV Intermittent every 12 hours  enoxaparin Injectable 30 milliGRAM(s) SubCutaneous every 24 hours  gabapentin 300 milliGRAM(s) Oral two times a day  levETIRAcetam 750 milliGRAM(s) Oral two times a day  melatonin 3 milliGRAM(s) Oral at bedtime  midodrine 10 milliGRAM(s) Oral every 8 hours  norepinephrine Infusion 0.05 MICROgram(s)/kG/Min IV Continuous <Continuous>  pantoprazole   Suspension 40 milliGRAM(s) Enteral Tube daily  piperacillin/tazobactam IVPB.. 3.375 Gram(s) IV Intermittent every 8 hours  raloxifene 60 milliGRAM(s) Oral daily  senna 2 Tablet(s) Oral at bedtime    PRN MEDICATIONS  acetaminophen     Tablet .. 650 milliGRAM(s) Oral every 6 hours PRN  aluminum hydroxide/magnesium hydroxide/simethicone Suspension 30 milliLiter(s) Oral every 4 hours PRN  ondansetron Injectable 4 milliGRAM(s) IV Push every 8 hours PRN    VITALS:   T(F): 100  HR: 70  BP: 127/58  RR: 19  SpO2: 100%    PHYSICAL EXAM:  GENERAL: NAD, lying in bed comfortably  HEAD:  Atraumatic, Normocephalic  EYES: EOMI, PERRLA, conjunctiva and sclera clear  ENT: Moist mucous membranes  NECK: Supple, No JVD  CHEST/LUNG: Clear to auscultation bilaterally; No rales, rhonchi, wheezing, or rubs. Unlabored respirations  HEART: Regular rate and rhythm; No murmurs, rubs, or gallops  ABDOMEN: BSx4; Soft, nontender, nondistended  EXTREMITIES:  2+ Peripheral Pulses, brisk capillary refill. No clubbing, cyanosis, or edema  NERVOUS SYSTEM:  A&Ox3, no focal deficits   SKIN: No rashes or lesions    LABS:                        12.1   21.34 )-----------( 437      ( 26 Jun 2024 19:10 )             37.7     06-26    140  |  101  |  19  ----------------------------<  183<H>  4.4   |  26  |  0.7    Ca    8.9      26 Jun 2024 19:10    TPro  7.9  /  Alb  3.6  /  TBili  0.4  /  DBili  x   /  AST  36  /  ALT  34  /  AlkPhos  130<H>  06-26    PT/INR - ( 26 Jun 2024 19:10 )   PT: 13.20 sec;   INR: 1.16 ratio         PTT - ( 26 Jun 2024 19:10 )  PTT:26.7 sec  Urinalysis Basic - ( 26 Jun 2024 22:10 )    Color: Yellow / Appearance: Turbid / SG: >1.030 / pH: x  Gluc: x / Ketone: Trace mg/dL  / Bili: Negative / Urobili: 0.2 mg/dL   Blood: x / Protein: 100 mg/dL / Nitrite: Negative   Leuk Esterase: Large / RBC: 38 /HPF / WBC 64 /HPF   Sq Epi: x / Non Sq Epi: 0 /HPF / Bacteria: Occasional /HPF      ABG - ( 27 Jun 2024 11:29 )  pH, Arterial: 7.42  pH, Blood: x     /  pCO2: 43    /  pO2: 177   / HCO3: 28    / Base Excess: 3.0   /  SaO2: 99.9        Lactate, Blood: 3.9 mmol/L *H* (06-27-24 @ 04:40)  Lactate, Blood: 5.4 mmol/L *HH* (06-26-24 @ 23:00)  Lactate, Blood: 2.4 mmol/L *H* (06-26-24 @ 19:10)      Urinalysis with Rflx Culture (collected 26 Jun 2024 22:10)      RADIOLOGY:  CXR  Xray Chest 1 View- PORTABLE-Urgent:   ACC: [id number] EXAM:  XR CHEST PORTABLE URGENT 1V   ORDERED BY: CELESTE ESTRADA     PROCEDURE DATE:  06/26/2024          INTERPRETATION:  Clinical History / Reason for exam: Shortness of breath    Comparison : Chest radiograph 6/12/2024.    Technique/Positioning: Single frontal chest x-ray obtained.    Findings:    Support devices: None.    Cardiac/mediastinum/hilum: Unchanged.    Lung parenchyma/Pleura: Bilateral parenchymal opacities better   appreciated on subsequent CT    Skeleton/soft tissues: Unchanged. Hiatal hernia.    Impression:  Bilateral parenchymal opacities better appreciated on subsequent CT.  Hiatal hernia.    --- End of Report ---      CT Angio Chest PE Protocol w/ IV Cont:   ACC: [id number] EXAM:  CT ANGIO CHEST PULM [facility]   ORDERED BY: DAMARIS HUMPHREY     PROCEDURE DATE:  06/27/2024          INTERPRETATION:  CLINICAL HISTORY/REASON FOR EXAM: Shortness of breath.    TECHNIQUE: Multislice helical sections were obtainedfrom the thoracic   inlet to the lung bases during rapid administration of 65 cc Omnipaque   350 intravenous contrast. Thin sections were reconstructed through the   pulmonary vasculature. 3D (MIP) reformats obtained. 35 cc contrast   discarded    COMPARISON: CT chest 5/6/2024, 4/24/2024.    FINDINGS:    PULMONARY EMBOLUS: No evidence of acute pulmonary embolism.    LUNGS, PLEURA, AIRWAYS: Diffuse, near complete left lower lobe and upper   lobe mixed consolidation and atelectasis, with some remaining aeration to   left upper lobe. Occlusion of right mainstem bronchus; correlation for   aspiration suggested. Left thoracic volume loss due to atelectasis.    Near complete resolution of right lung airspace opacification, with few   residual likely inflammatory/infectious groundglass airspace opacities.   Right basilar subsegmental atelectasis.    Trace left pleural effusion. No pneumothorax.    THORACIC NODES: Prominent likely reactive mediastinal lymph nodes    MEDIASTINUM/GREAT VESSELS: No pericardial effusion. Heart size is within   normal limits. The aorta and main pulmonary artery are of normal caliber.   Moderate hiatal hernia.    BONES/SOFT TISSUES: Unremarkable.    VISUALIZED UPPER ABDOMEN: Unremarkable.      IMPRESSION:      1. Diffuse, near complete left lower lobe and upper lobe mixed   consolidation and atelectasis, with some remaining aeration to left upper   lobe. Occlusion of right mainstem bronchus; correlation for aspiration   suggested.    2. Near complete resolutionof right lung airspace opacification, with   few residual likely inflammatory/infectious groundglass airspace   opacities.    3. No evidence of acute pulmonary embolism.    --- End of Report ---               Patient is a 58y old Female who presents with a chief complaint of Sepsis and Acute respiratory failure with hypoxia. Today is hospital day 1d. This morning patient was seen and examined at bedside with AVAPS for airway mngmnt. she had a seizure episode where we gave 2mg of ativan.   No acute or major events overnight.    Code Status: DNR/I per MOLST COPY IN 2/2024     PAST MEDICAL & SURGICAL HISTORY  Down syndrome    Osteoporosis    Mild anemia    Neuropathy    Cerebral palsy    Seizure    Nonverbal    Hypotension  on midodrine    S/P debridement  of R hip on 3/2/21    S/P percutaneous endoscopic gastrostomy (PEG) tube placement      SOCIAL HISTORY:  Social History: lives at [facility]      ALLERGIES:  chlorhexidine containing compounds (Rash (Mild to Mod))    MEDICATIONS:  STANDING MEDICATIONS  albuterol/ipratropium for Nebulization 3 milliLiter(s) Nebulizer every 6 hours  calcium carbonate   1250 mG (OsCal) 1 Tablet(s) Oral daily  doxycycline IVPB 100 milliGRAM(s) IV Intermittent every 12 hours  enoxaparin Injectable 30 milliGRAM(s) SubCutaneous every 24 hours  gabapentin 300 milliGRAM(s) Oral two times a day  levETIRAcetam 750 milliGRAM(s) Oral two times a day  melatonin 3 milliGRAM(s) Oral at bedtime  midodrine 10 milliGRAM(s) Oral every 8 hours  norepinephrine Infusion 0.05 MICROgram(s)/kG/Min IV Continuous <Continuous>  pantoprazole   Suspension 40 milliGRAM(s) Enteral Tube daily  piperacillin/tazobactam IVPB.. 3.375 Gram(s) IV Intermittent every 8 hours  raloxifene 60 milliGRAM(s) Oral daily  senna 2 Tablet(s) Oral at bedtime    PRN MEDICATIONS  acetaminophen     Tablet .. 650 milliGRAM(s) Oral every 6 hours PRN  aluminum hydroxide/magnesium hydroxide/simethicone Suspension 30 milliLiter(s) Oral every 4 hours PRN  ondansetron Injectable 4 milliGRAM(s) IV Push every 8 hours PRN    VITALS:   T(F): 100  HR: 70  BP: 127/58  RR: 19  SpO2: 100%    PHYSICAL EXAM:  GENERAL: NAD, lying in bed comfortably  HEAD:  Atraumatic, Normocephalic  EYES: EOMI, PERRLA, conjunctiva and sclera clear  ENT: Moist mucous membranes  NECK: Supple, No JVD  CHEST/LUNG: Clear to auscultation bilaterally; No rales, rhonchi, wheezing, or rubs. Unlabored respirations  HEART: Regular rate and rhythm; No murmurs, rubs, or gallops  ABDOMEN: BSx4; Soft, nontender, nondistended  EXTREMITIES:  2+ Peripheral Pulses, brisk capillary refill. No clubbing, cyanosis, or edema  NERVOUS SYSTEM:  A&Ox3, no focal deficits   SKIN: No rashes or lesions    LABS:                        12.1   21.34 )-----------( 437      ( 26 Jun 2024 19:10 )             37.7     06-26    140  |  101  |  19  ----------------------------<  183<H>  4.4   |  26  |  0.7    Ca    8.9      26 Jun 2024 19:10    TPro  7.9  /  Alb  3.6  /  TBili  0.4  /  DBili  x   /  AST  36  /  ALT  34  /  AlkPhos  130<H>  06-26    PT/INR - ( 26 Jun 2024 19:10 )   PT: 13.20 sec;   INR: 1.16 ratio         PTT - ( 26 Jun 2024 19:10 )  PTT:26.7 sec  Urinalysis Basic - ( 26 Jun 2024 22:10 )    Color: Yellow / Appearance: Turbid / SG: >1.030 / pH: x  Gluc: x / Ketone: Trace mg/dL  / Bili: Negative / Urobili: 0.2 mg/dL   Blood: x / Protein: 100 mg/dL / Nitrite: Negative   Leuk Esterase: Large / RBC: 38 /HPF / WBC 64 /HPF   Sq Epi: x / Non Sq Epi: 0 /HPF / Bacteria: Occasional /HPF      ABG - ( 27 Jun 2024 11:29 )  pH, Arterial: 7.42  pH, Blood: x     /  pCO2: 43    /  pO2: 177   / HCO3: 28    / Base Excess: 3.0   /  SaO2: 99.9        Lactate, Blood: 3.9 mmol/L *H* (06-27-24 @ 04:40)  Lactate, Blood: 5.4 mmol/L *HH* (06-26-24 @ 23:00)  Lactate, Blood: 2.4 mmol/L *H* (06-26-24 @ 19:10)      Urinalysis with Rflx Culture (collected 26 Jun 2024 22:10)      RADIOLOGY:  CXR  Xray Chest 1 View- PORTABLE-Urgent:   ACC: [id number] EXAM:  XR CHEST PORTABLE URGENT 1V   ORDERED BY: CELESTE ESTRADA     PROCEDURE DATE:  06/26/2024          INTERPRETATION:  Clinical History / Reason for exam: Shortness of breath    Comparison : Chest radiograph 6/12/2024.    Technique/Positioning: Single frontal chest x-ray obtained.    Findings:    Support devices: None.    Cardiac/mediastinum/hilum: Unchanged.    Lung parenchyma/Pleura: Bilateral parenchymal opacities better   appreciated on subsequent CT    Skeleton/soft tissues: Unchanged. Hiatal hernia.    Impression:  Bilateral parenchymal opacities better appreciated on subsequent CT.  Hiatal hernia.    --- End of Report ---      CT Angio Chest PE Protocol w/ IV Cont:   ACC: [id number] EXAM:  CT ANGIO CHEST PULM [facility]   ORDERED BY: DAMARIS HUMPHREY     PROCEDURE DATE:  06/27/2024          INTERPRETATION:  CLINICAL HISTORY/REASON FOR EXAM: Shortness of breath.    TECHNIQUE: Multislice helical sections were obtainedfrom the thoracic   inlet to the lung bases during rapid administration of 65 cc Omnipaque   350 intravenous contrast. Thin sections were reconstructed through the   pulmonary vasculature. 3D (MIP) reformats obtained. 35 cc contrast   discarded    COMPARISON: CT chest 5/6/2024, 4/24/2024.    FINDINGS:    PULMONARY EMBOLUS: No evidence of acute pulmonary embolism.    LUNGS, PLEURA, AIRWAYS: Diffuse, near complete left lower lobe and upper   lobe mixed consolidation and atelectasis, with some remaining aeration to   left upper lobe. Occlusion of right mainstem bronchus; correlation for   aspiration suggested. Left thoracic volume loss due to atelectasis.    Near complete resolution of right lung airspace opacification, with few   residual likely inflammatory/infectious groundglass airspace opacities.   Right basilar subsegmental atelectasis.    Trace left pleural effusion. No pneumothorax.    THORACIC NODES: Prominent likely reactive mediastinal lymph nodes    MEDIASTINUM/GREAT VESSELS: No pericardial effusion. Heart size is within   normal limits. The aorta and main pulmonary artery are of normal caliber.   Moderate hiatal hernia.    BONES/SOFT TISSUES: Unremarkable.    VISUALIZED UPPER ABDOMEN: Unremarkable.      IMPRESSION:      1. Diffuse, near complete left lower lobe and upper lobe mixed   consolidation and atelectasis, with some remaining aeration to left upper   lobe. Occlusion of right mainstem bronchus; correlation for aspiration   suggested.    2. Near complete resolutionof right lung airspace opacification, with   few residual likely inflammatory/infectious groundglass airspace   opacities.    3. No evidence of acute pulmonary embolism.    --- End of Report ---

## 2024-06-27 NOTE — CONSULT NOTE ADULT - ASSESSMENT
IMPRESSION:    Acute hypoxemic respiratory failure  PNA possible aspiration/ recurrent admission  Sepsis POA  HO DVT  HO GI bleed  HO OM  HO recent duodenal perforation   HO polymicrobial bacteremia   H/o CP, DS  H/o seizures    PLAN:    CNS:  Avoid CNS Depressant, AED.    HEENT: Oral care. Eye drops.    PULMONARY: HOB at 45 degrees.  Aspiration precaution. Wean FiO2 Keep spo2 92 TO 96%. CHEST PT. Nebs q6 . Keep AVAPS    CARDIOVASCULAR: Keep MAP more than 60. Avoid overload    GI: Protonix.     INFECTIOUS DISEASE:  ABX ( vanco/ danna)    HEMATOLOGICAL:  DVT px, Monitor CBC. LE doppler    ENDOCRINE:  Follow up FS.  Insulin protocol if needed.    MUSCULOSKELETAL: Bedrest.  Off loading.  Wound care.    Prognosis very poor.  Palliative care  dnr/i,   sdu

## 2024-06-27 NOTE — CONSULT NOTE ADULT - PROBLEM SELECTOR RECOMMENDATION 4
- will follow  ______________  Wu Jenkins MD  Palliative Medicine  [facility]   [facility]  [phone]

## 2024-06-27 NOTE — CONSULT NOTE ADULT - PROBLEM SELECTOR RECOMMENDATION 3
- palliative has consulted numerous times in past; patient has I/DD and lives in a group home, and falls under the purview of OPWDD; any decisions regarding care withdrawal, such as institution of CMO orders, would require approval from [facility] and non-objection from [facility]; in the past, patient's respiratory concerns have not been life-limiting and end-stage, and have not met requirements for further care withdrawal side from already established DNR/DNI  - will observe clinical course and reconsider GOC based on course

## 2024-06-27 NOTE — PROGRESS NOTE ADULT - ASSESSMENT
59 yo F with hx of down syndrome, anemia, cerebral palsy, seizure disorder, osteoporosis, hypotension on midodrine, nonverbal at baseline who was BIBEMS from group home for respiratory distress with complaints of fever and hypoxia likely 2/2 to bilateral basilar pneumonia . On arrival patient was tachycardic to 137. Was febrile to 102.4 F and was tachypneic to 30s. The patient was put on AVAPS with patient saturating 92 %, was given STAT dose of cefepime and vancomycin, got 1250 cc of bolus (30 cc/kg per sepsis protocol).     #Acute Hypoxemic Respiratory Failure 2/2 to PNA vs aspiration PNA  # Sepsis POA   # Hx Bacteremia with ESBL Proteus and VR E fecalis and fecium  -  admitted to SDU, low threshold for ICU UPGRADE   - f/u ABG/Lactate  and troponin , trend lactate  - another bolus of LR and c/w IVF LR  - 0.08 levophed , wean as tolerated   - Recent ECHO with normal EF, goal directed fluid resuscitation  - UA: Large LE 64 WBC, Yeast like cells +nt,  F/u Urine culture, f/u Blood culture  - GET RVP,  Procalcitonin/ MRSA, Fungitell, urine strep and legionella and sputum cultures   - ID consult for eval and approval of  meropenem and lenizolide and ? antifungal    - monitor CBC ,CMP     #Hx of Cerebral Palsy/ Nonverbal at baseline  #Hx of Seizures  -  Keep off sedatives, c/w Keppra home dose. Was given STAT 1g Keppra and 4 mg Ativan for witnessed seizure in ED  - f/u Keppra levels,    hold  PEG feeding for now   requent turning, off loading and positioning and skin care as per protocol, Maintain pressure injury prevention, Keep skin clean, Offload heels, Monitor skin for wound or changes and notify provider if any      DVT prophylaxis: Lovenox for now and check LE duplex  diet: hold PEG feeding for now    57 yo F with hx of down syndrome, anemia, cerebral palsy, seizure disorder, osteoporosis, hypotension on midodrine, nonverbal at baseline who was BIBEMS from group home for respiratory distress with complaints of fever and hypoxia likely 2/2 to bilateral basilar pneumonia . On arrival patient was tachycardic to 137. Was febrile to 102.4 F and was tachypneic to 30s. The patient was put on AVAPS with patient saturating 92 %, was given STAT dose of cefepime and vancomycin, got 1250 cc of bolus (30 cc/kg per sepsis protocol).     #Acute Hypoxemic Respiratory Failure 2/2 to PNA vs aspiration PNA  # Sepsis POA   # Hx Bacteremia with ESBL Proteus and VR E fecalis and fecium  -  admitted to SDU, low threshold for ICU UPGRADE   - f/u ABG/Lactate  and troponin , trend lactate  - another bolus of LR and c/w IVF LR  - 0.08 levophed , wean as tolerated   - Recent ECHO with normal EF, goal directed fluid resuscitation  - UA: Large LE 64 WBC, Yeast like cells +nt,  F/u Urine culture, f/u Blood culture  - GET RVP,  Procalcitonin/ MRSA, Fungitell, urine strep and legionella and sputum cultures   - ID consult for eval and approval of  meropenem and lenizolide and ? antifungal    - monitor CBC ,CMP     #Hx of Cerebral Palsy/ Nonverbal at baseline  #Hx of Seizures  -  Keep off sedatives, c/w Keppra home dose. Was given STAT 1g Keppra and 4 mg Ativan for witnessed seizure in ED  -  f/u Keppra levels    hold PEG feeding for now   frequent turning, off loading and positioning and skin care as per protocol, Maintain pressure injury prevention, Keep skin clean, Offload heels, Monitor skin for wound or changes and notify provider if any      DVT prophylaxis: Lovenox for now and check LE duplex  diet: hold PEG feeding for now    59 yo F with hx of down syndrome, anemia, cerebral palsy, seizure disorder, osteoporosis, hypotension on midodrine, nonverbal at baseline who was BIBEMS from group home for respiratory distress with complaints of fever and hypoxia likely 2/2 to bilateral basilar pneumonia . On arrival patient was tachycardic to 137. Was febrile to 102.4 F and was tachypneic to 30s. The patient was put on AVAPS with patient saturating 92 %, was given STAT dose of cefepime and vancomycin, got 1250 cc of bolus (30 cc/kg per sepsis protocol).     #Acute Hypoxemic Respiratory Failure 2/2 to PNA vs aspiration PNA  # Sepsis POA   # Hx Bacteremia with ESBL Proteus and VR E fecalis and fecium  -  admitted to SDU, low threshold for ICU UPGRADE   - f/u ABG/Lactate  and troponin , trend lactate  - another bolus of LR and c/w IVF LR  - 0.08 levophed , wean as tolerated   - Recent ECHO with normal EF, goal directed fluid resuscitation  - UA: Large LE 64 WBC, Yeast like cells +nt,  F/u Urine culture, f/u Blood culture  - GET RVP,  Procalcitonin/ MRSA, urine strep and legionella and sputum cultures   - ID consult: meropenem and linezolid started   - monitor CBC ,CMP     #Hx of Cerebral Palsy/ Nonverbal at baseline  #Hx of Seizures  -  Keep off sedatives, c/w Keppra home dose. Was given STAT 1g Keppra and 4 mg Ativan for witnessed seizure in ED  -  f/u Keppra levels  - seizure episode 6/27, gave 2mg ativan     hold PEG feeding for now   frequent turning, off loading and positioning and skin care as per protocol, Maintain pressure injury prevention, Keep skin clean, Offload heels, Monitor skin for wound or changes and notify provider if any      DVT prophylaxis: Lovenox for now and check LE duplex  diet: hold PEG feeding for now    57 yo F with hx of down syndrome, anemia, cerebral palsy, seizure disorder, osteoporosis, hypotension on midodrine, nonverbal at baseline who was BIBEMS from group home for respiratory distress with complaints of fever and hypoxia likely 2/2 to bilateral basilar pneumonia . On arrival patient was tachycardic to 137. Was febrile to 102.4 F and was tachypneic to 30s. The patient was put on AVAPS with patient saturating 92 %, was given STAT dose of cefepime and vancomycin, got 1250 cc of bolus (30 cc/kg per sepsis protocol).     #Acute Hypoxemic Respiratory Failure 2/2 to PNA vs aspiration PNA  # Sepsis POA   # Hx Bacteremia with ESBL Proteus and VR E fecalis and fecium  -  admitted to SDU, low threshold for ICU UPGRADE   - f/u ABG/Lactate  and troponin , trend lactate  - another bolus of LR and c/w IVF LR  - 0.08 levophed , wean as tolerated   - Recent ECHO with normal EF, goal directed fluid resuscitation  - UA: Large LE 64 WBC, Yeast like cells +nt,  F/u Urine culture, f/u Blood culture  - GET RVP,  Procalcitonin/ MRSA, urine strep and legionella and sputum cultures   - ID consult: meropenem and linezolid started   - monitor CBC ,CMP     #Hx of Cerebral Palsy/ Nonverbal at baseline  #Hx of Seizures  -  Keep off sedatives, c/w Keppra home dose. Was given STAT 1g Keppra and 4 mg Ativan for witnessed seizure in ED  -  f/u Keppra levels  - seizure episode 6/27, gave 2mg ativan   - F/U head CT noncont    hold PEG feeding for now   frequent turning, off loading and positioning and skin care as per protocol, Maintain pressure injury prevention, Keep skin clean, Offload heels, Monitor skin for wound or changes and notify provider if any      DVT prophylaxis: Lovenox for now and check LE duplex  diet: hold PEG feeding for now

## 2024-06-28 LAB
ALBUMIN SERPL ELPH-MCNC: 2.9 G/DL — LOW (ref 3.5–5.2)
ALP SERPL-CCNC: 96 U/L — SIGNIFICANT CHANGE UP (ref 30–115)
ALT FLD-CCNC: 15 U/L — SIGNIFICANT CHANGE UP (ref 0–41)
ANION GAP SERPL CALC-SCNC: 10 MMOL/L — SIGNIFICANT CHANGE UP (ref 7–14)
AST SERPL-CCNC: 15 U/L — SIGNIFICANT CHANGE UP (ref 0–41)
BILIRUB SERPL-MCNC: 0.4 MG/DL — SIGNIFICANT CHANGE UP (ref 0.2–1.2)
BUN SERPL-MCNC: 5 MG/DL — LOW (ref 10–20)
CALCIUM SERPL-MCNC: 8.8 MG/DL — SIGNIFICANT CHANGE UP (ref 8.4–10.5)
CHLORIDE SERPL-SCNC: 99 MMOL/L — SIGNIFICANT CHANGE UP (ref 98–110)
CO2 SERPL-SCNC: 28 MMOL/L — SIGNIFICANT CHANGE UP (ref 17–32)
CREAT SERPL-MCNC: 0.5 MG/DL — LOW (ref 0.7–1.5)
D DIMER BLD IA.RAPID-MCNC: 1093 NG/ML DDU — HIGH
EGFR: 109 ML/MIN/1.73M2 — SIGNIFICANT CHANGE UP
GLUCOSE SERPL-MCNC: 141 MG/DL — HIGH (ref 70–99)
HCT VFR BLD CALC: 28.5 % — LOW (ref 37–47)
HGB BLD-MCNC: 9.4 G/DL — LOW (ref 12–16)
LACTATE SERPL-SCNC: 2.7 MMOL/L — HIGH (ref 0.7–2)
MAGNESIUM SERPL-MCNC: 1.7 MG/DL — LOW (ref 1.8–2.4)
MCHC RBC-ENTMCNC: 30.6 PG — SIGNIFICANT CHANGE UP (ref 27–31)
MCHC RBC-ENTMCNC: 33 G/DL — SIGNIFICANT CHANGE UP (ref 32–37)
MCV RBC AUTO: 92.8 FL — SIGNIFICANT CHANGE UP (ref 81–99)
NRBC # BLD: 0 /100 WBCS — SIGNIFICANT CHANGE UP (ref 0–0)
PLATELET # BLD AUTO: 331 K/UL — SIGNIFICANT CHANGE UP (ref 130–400)
PMV BLD: 10.5 FL — HIGH (ref 7.4–10.4)
POTASSIUM SERPL-MCNC: 3.1 MMOL/L — LOW (ref 3.5–5)
POTASSIUM SERPL-SCNC: 3.1 MMOL/L — LOW (ref 3.5–5)
PROT SERPL-MCNC: 6.1 G/DL — SIGNIFICANT CHANGE UP (ref 6–8)
RBC # BLD: 3.07 M/UL — LOW (ref 4.2–5.4)
RBC # FLD: 19.8 % — HIGH (ref 11.5–14.5)
SODIUM SERPL-SCNC: 137 MMOL/L — SIGNIFICANT CHANGE UP (ref 135–146)
WBC # BLD: 26.57 K/UL — HIGH (ref 4.8–10.8)
WBC # FLD AUTO: 26.57 K/UL — HIGH (ref 4.8–10.8)

## 2024-06-28 PROCEDURE — 71045 X-RAY EXAM CHEST 1 VIEW: CPT | Mod: 26

## 2024-06-28 PROCEDURE — 93970 EXTREMITY STUDY: CPT | Mod: 26

## 2024-06-28 PROCEDURE — 99233 SBSQ HOSP IP/OBS HIGH 50: CPT

## 2024-06-28 RX ORDER — ACETAMINOPHEN 325 MG
625 TABLET ORAL ONCE
Refills: 0 | Status: COMPLETED | OUTPATIENT
Start: 2024-06-28 | End: 2024-06-28

## 2024-06-28 RX ORDER — MAGNESIUM SULFATE 100 %
2 POWDER (GRAM) MISCELLANEOUS ONCE
Refills: 0 | Status: DISCONTINUED | OUTPATIENT
Start: 2024-06-28 | End: 2024-06-28

## 2024-06-28 RX ORDER — NOREPINEPHRINE BITARTRATE 1 MG/ML
0.05 INJECTION INTRAVENOUS
Qty: 8 | Refills: 0 | Status: DISCONTINUED | OUTPATIENT
Start: 2024-06-28 | End: 2024-06-29

## 2024-06-28 RX ORDER — ACETAMINOPHEN 325 MG
625 TABLET ORAL ONCE
Refills: 0 | Status: DISCONTINUED | OUTPATIENT
Start: 2024-06-28 | End: 2024-06-28

## 2024-06-28 RX ORDER — MAGNESIUM SULFATE 100 %
2 POWDER (GRAM) MISCELLANEOUS ONCE
Refills: 0 | Status: COMPLETED | OUTPATIENT
Start: 2024-06-28 | End: 2024-06-28

## 2024-06-28 RX ORDER — DEXTROSE MONOHYDRATE AND SODIUM CHLORIDE 5; .3 G/100ML; G/100ML
500 INJECTION, SOLUTION INTRAVENOUS ONCE
Refills: 0 | Status: COMPLETED | OUTPATIENT
Start: 2024-06-28 | End: 2024-06-28

## 2024-06-28 RX ORDER — HYDROCORTISONE 100 MG/60ML
50 SUSPENSION RECTAL EVERY 6 HOURS
Refills: 0 | Status: DISCONTINUED | OUTPATIENT
Start: 2024-06-28 | End: 2024-06-29

## 2024-06-28 RX ADMIN — LEVETIRACETAM 750 MILLIGRAM(S): 100 INJECTION INTRAVENOUS at 06:24

## 2024-06-28 RX ADMIN — Medication 25 GRAM(S): at 16:06

## 2024-06-28 RX ADMIN — Medication 2 TABLET(S): at 00:07

## 2024-06-28 RX ADMIN — MEROPENEM 100 MILLIGRAM(S): 500 INJECTION, POWDER, FOR SOLUTION INTRAVENOUS at 22:19

## 2024-06-28 RX ADMIN — ENOXAPARIN SODIUM 30 MILLIGRAM(S): 100 INJECTION SUBCUTANEOUS at 11:38

## 2024-06-28 RX ADMIN — Medication 300 MILLIGRAM(S): at 06:24

## 2024-06-28 RX ADMIN — DOXYCYCLINE 100 MILLIGRAM(S): 100 CAPSULE ORAL at 18:21

## 2024-06-28 RX ADMIN — DOXYCYCLINE 100 MILLIGRAM(S): 100 CAPSULE ORAL at 08:57

## 2024-06-28 RX ADMIN — MEROPENEM 100 MILLIGRAM(S): 500 INJECTION, POWDER, FOR SOLUTION INTRAVENOUS at 06:24

## 2024-06-28 RX ADMIN — MEROPENEM 100 MILLIGRAM(S): 500 INJECTION, POWDER, FOR SOLUTION INTRAVENOUS at 12:31

## 2024-06-28 RX ADMIN — LINEZOLID 300 MILLIGRAM(S): 600 TABLET, FILM COATED ORAL at 06:41

## 2024-06-28 RX ADMIN — PANTOPRAZOLE SODIUM 40 MILLIGRAM(S): 40 INJECTION, POWDER, FOR SOLUTION INTRAVENOUS at 11:38

## 2024-06-28 RX ADMIN — IPRATROPIUM BROMIDE AND ALBUTEROL SULFATE 3 MILLILITER(S): .5; 3 SOLUTION RESPIRATORY (INHALATION) at 02:49

## 2024-06-28 RX ADMIN — Medication 650 MILLIGRAM(S): at 09:43

## 2024-06-28 RX ADMIN — MIDODRINE HYDROCHLORIDE 10 MILLIGRAM(S): 10 TABLET ORAL at 12:31

## 2024-06-28 RX ADMIN — NOREPINEPHRINE BITARTRATE 3.84 MICROGRAM(S)/KG/MIN: 1 INJECTION INTRAVENOUS at 13:38

## 2024-06-28 RX ADMIN — HYDROCORTISONE 50 MILLIGRAM(S): 100 SUSPENSION RECTAL at 11:38

## 2024-06-28 RX ADMIN — LEVETIRACETAM 750 MILLIGRAM(S): 100 INJECTION INTRAVENOUS at 18:22

## 2024-06-28 RX ADMIN — RALOXIFENE HYDROCHLORIDE 60 MILLIGRAM(S): 60 TABLET, FILM COATED ORAL at 11:38

## 2024-06-28 RX ADMIN — DEXTROSE MONOHYDRATE AND SODIUM CHLORIDE 1000 MILLILITER(S): 5; .3 INJECTION, SOLUTION INTRAVENOUS at 15:48

## 2024-06-28 RX ADMIN — MIDODRINE HYDROCHLORIDE 10 MILLIGRAM(S): 10 TABLET ORAL at 22:19

## 2024-06-28 RX ADMIN — Medication 1 TABLET(S): at 11:39

## 2024-06-28 RX ADMIN — LINEZOLID 300 MILLIGRAM(S): 600 TABLET, FILM COATED ORAL at 18:22

## 2024-06-28 RX ADMIN — MIDODRINE HYDROCHLORIDE 10 MILLIGRAM(S): 10 TABLET ORAL at 06:24

## 2024-06-28 RX ADMIN — Medication 300 MILLIGRAM(S): at 18:21

## 2024-06-28 RX ADMIN — DEXTROSE MONOHYDRATE AND SODIUM CHLORIDE 1000 MILLILITER(S): 5; .3 INJECTION, SOLUTION INTRAVENOUS at 08:48

## 2024-06-28 RX ADMIN — Medication 250 MILLIGRAM(S): at 16:03

## 2024-06-28 RX ADMIN — Medication 3 MILLIGRAM(S): at 22:19

## 2024-06-28 RX ADMIN — HYDROCORTISONE 50 MILLIGRAM(S): 100 SUSPENSION RECTAL at 18:22

## 2024-06-28 RX ADMIN — Medication 2 TABLET(S): at 22:19

## 2024-06-28 RX ADMIN — NOREPINEPHRINE BITARTRATE 3.84 MICROGRAM(S)/KG/MIN: 1 INJECTION INTRAVENOUS at 06:23

## 2024-06-28 NOTE — PROGRESS NOTE ADULT - SUBJECTIVE AND OBJECTIVE BOX
TEA ECHAVARRIA  58y  [facility] Hold 018 A      Patient is a 58y old  Female who presents with a chief complaint of Sepsis (28 Jun 2024 05:55)      INTERVAL HPI/OVERNIGHT EVENTS:        REVIEW OF SYSTEMS:        FAMILY HISTORY:    T(C): 36.8 (06-28-24 @ 08:00), Max: 38.2 (06-27-24 @ 18:25)  HR: 125 (06-28-24 @ 08:00) (61 - 130)  BP: 101/59 (06-28-24 @ 08:00) (79/51 - 147/98)  RR: 18 (06-28-24 @ 08:00) (18 - 20)  SpO2: 97% (06-28-24 @ 08:00) (97% - 100%)  Wt(kg): --Vital Signs Last 24 Hrs  T(C): 36.8 (28 Jun 2024 08:00), Max: 38.2 (27 Jun 2024 18:25)  T(F): 98.2 (28 Jun 2024 08:00), Max: 100.8 (27 Jun 2024 18:25)  HR: 125 (28 Jun 2024 08:00) (61 - 130)  BP: 101/59 (28 Jun 2024 08:00) (79/51 - 147/98)  BP(mean): 76 (27 Jun 2024 18:25) (61 - 112)  RR: 18 (28 Jun 2024 08:00) (18 - 20)  SpO2: 97% (28 Jun 2024 08:00) (97% - 100%)    Parameters below as of 28 Jun 2024 08:00  Patient On (Oxygen Delivery Method): BiPAP/CPAP        PHYSICAL EXAM:  GENERAL: NAD, well-groomed, well-developed  HEAD:  Atraumatic, Normocephalic  EYES: EOMI, PERRLA, conjunctiva and sclera clear  ENMT: No tonsillar erythema, exudates, or enlargement; Moist mucous membranes, Good dentition, No lesions  NECK: Supple, No JVD, Normal thyroid  NERVOUS SYSTEM:  Alert & Oriented X3, Good concentration; Motor Strength 5/5 B/L upper and lower extremities; DTRs 2+ intact and symmetric  PULM: Clear to auscultation bilaterally  CARDIAC: Regular rate and rhythm; No murmurs, rubs, or gallops  GI: Soft, Nontender, Nondistended; Bowel sounds present  EXTREMITIES:  2+ Peripheral Pulses, No clubbing, cyanosis, or edema  LYMPH: No lymphadenopathy noted  SKIN: No rashes or lesions    Consultant(s) Notes Reviewed:  [x ] YES  [ ] NO  Care Discussed with Consultants/Other Providers [ x] YES  [ ] NO    LABS:                            12.1   21.34 )-----------( 437      ( 26 Jun 2024 19:10 )             37.7   06-26    140  |  101  |  19  ----------------------------<  183<H>  4.4   |  26  |  0.7    Ca    8.9      26 Jun 2024 19:10    TPro  7.9  /  Alb  3.6  /  TBili  0.4  /  DBili  x   /  AST  36  /  ALT  34  /  AlkPhos  130<H>  06-26            Urinalysis with Rflx Culture (collected 26 Jun 2024 22:10)    Culture - Blood (collected 26 Jun 2024 19:14)  Source: .Blood Blood-Peripheral  Preliminary Report (27 Jun 2024 23:03):    No growth at 24 hours    Culture - Blood (collected 26 Jun 2024 19:14)  Source: .Blood Blood-Peripheral  Preliminary Report (27 Jun 2024 23:03):    No growth at 24 hours      acetaminophen     Tablet .. 650 milliGRAM(s) Oral every 6 hours PRN  albuterol/ipratropium for Nebulization 3 milliLiter(s) Nebulizer every 6 hours  aluminum hydroxide/magnesium hydroxide/simethicone Suspension 30 milliLiter(s) Oral every 4 hours PRN  calcium carbonate   1250 mG (OsCal) 1 Tablet(s) Oral daily  doxycycline IVPB 100 milliGRAM(s) IV Intermittent every 12 hours  enoxaparin Injectable 30 milliGRAM(s) SubCutaneous every 24 hours  gabapentin 300 milliGRAM(s) Oral two times a day  levETIRAcetam 750 milliGRAM(s) Oral two times a day  linezolid  IVPB      linezolid  IVPB 600 milliGRAM(s) IV Intermittent every 12 hours  melatonin 3 milliGRAM(s) Oral at bedtime  meropenem  IVPB      meropenem  IVPB 1000 milliGRAM(s) IV Intermittent every 8 hours  midodrine 10 milliGRAM(s) Oral every 8 hours  norepinephrine Infusion 0.05 MICROgram(s)/kG/Min IV Continuous <Continuous>  ondansetron Injectable 4 milliGRAM(s) IV Push every 8 hours PRN  pantoprazole   Suspension 40 milliGRAM(s) Enteral Tube daily  raloxifene 60 milliGRAM(s) Oral daily  senna 2 Tablet(s) Oral at bedtime      1. Acute Hypoxemic Respiratory Failure  and Septic shock (POA) secondary to pneumonia  possible aspiration/ recurrent admission. Hx Bacteremia with ESBL Proteus and VR E fecalis and fecium. hx of down syndrome . Hx of Cerebral Palsy/ Nonverbal at baseline   - Admit to SDU                         - ECG:                  - CTH:No change                - LA remain elevated . Repeat LA  - Given LR bolus now on IVF LR . Give another bolus of LR   - Cont levophed . also on midodrine   * Recent ECHO with normal EF, goal directed fluid resuscitation  - Cont Zyvox and meropenem   - Blood cx:pending            - Sputum cx:pending   - Start Hydrocortisone 50mg IV q6hr   - Chest PT via vest   - CTA chest:   a) reported Diffuse, near complete left lower lobe and upper lobe mixed consolidation and atelectasis, with some remaining aeration to left upper   lobe.   b) Occlusion of right mainstem bronchus; correlation for aspiration suggested.   c) Near complete resolution of right lung airspace opacification, with  few residual likely inflammatory/infectious ground-glass airspace opacities.   d) No evidence of acute pulmonary embolism.  - Critical care consult appreciated   - ID consult :pending   - Palliative care consult:pending       2.Seizure  - Cont home meds      3. Hx of duodena perforation  - Cont PPI     4. DVT px Lovenox     SDU low threshold for ICU UPGRADE - PULMM CC ON BOARD   OVER ALL POOR PROGNOSIS  DNR/I asper MOLST COPY IN 2/2024 - Palliative team consult.      TEA ECHAVARRIA  58y  [facility] Hold 018 A      Patient is a 58y old  Female who presents with a chief complaint of Sepsis (28 Jun 2024 05:55)      INTERVAL HPI/OVERNIGHT EVENTS:    Patient is ill looking  still with increase work of breathing  still on pressors  she is non verbal  no other events noted         FAMILY HISTORY:    T(C): 36.8 (06-28-24 @ 08:00), Max: 38.2 (06-27-24 @ 18:25)  HR: 125 (06-28-24 @ 08:00) (61 - 130)  BP: 101/59 (06-28-24 @ 08:00) (79/51 - 147/98)  RR: 18 (06-28-24 @ 08:00) (18 - 20)  SpO2: 97% (06-28-24 @ 08:00) (97% - 100%)  Wt(kg): --Vital Signs Last 24 Hrs  T(C): 36.8 (28 Jun 2024 08:00), Max: 38.2 (27 Jun 2024 18:25)  T(F): 98.2 (28 Jun 2024 08:00), Max: 100.8 (27 Jun 2024 18:25)  HR: 125 (28 Jun 2024 08:00) (61 - 130)  BP: 101/59 (28 Jun 2024 08:00) (79/51 - 147/98)  BP(mean): 76 (27 Jun 2024 18:25) (61 - 112)  RR: 18 (28 Jun 2024 08:00) (18 - 20)  SpO2: 97% (28 Jun 2024 08:00) (97% - 100%)    Parameters below as of 28 Jun 2024 08:00  Patient On (Oxygen Delivery Method): BiPAP/CPAP        PHYSICAL EXAM:  GENERAL: Ill looking   PULM: Diffuse rhonchi   CARDIAC: Regular rate and rhythm;   GI: Soft, Nontender, Nondistended; Bowel sounds present  EXTREMITIES:  2+ Peripheral Pulse    Consultant(s) Notes Reviewed:  [x ] YES  [ ] NO  Care Discussed with Consultants/Other Providers [ x] YES  [ ] NO    LABS:                            12.1   21.34 )-----------( 437      ( 26 Jun 2024 19:10 )             37.7   06-26    140  |  101  |  19  ----------------------------<  183<H>  4.4   |  26  |  0.7    Ca    8.9      26 Jun 2024 19:10    TPro  7.9  /  Alb  3.6  /  TBili  0.4  /  DBili  x   /  AST  36  /  ALT  34  /  AlkPhos  130<H>  06-26            Urinalysis with Rflx Culture (collected 26 Jun 2024 22:10)    Culture - Blood (collected 26 Jun 2024 19:14)  Source: .Blood Blood-Peripheral  Preliminary Report (27 Jun 2024 23:03):    No growth at 24 hours    Culture - Blood (collected 26 Jun 2024 19:14)  Source: .Blood Blood-Peripheral  Preliminary Report (27 Jun 2024 23:03):    No growth at 24 hours      acetaminophen     Tablet .. 650 milliGRAM(s) Oral every 6 hours PRN  albuterol/ipratropium for Nebulization 3 milliLiter(s) Nebulizer every 6 hours  aluminum hydroxide/magnesium hydroxide/simethicone Suspension 30 milliLiter(s) Oral every 4 hours PRN  calcium carbonate   1250 mG (OsCal) 1 Tablet(s) Oral daily  doxycycline IVPB 100 milliGRAM(s) IV Intermittent every 12 hours  enoxaparin Injectable 30 milliGRAM(s) SubCutaneous every 24 hours  gabapentin 300 milliGRAM(s) Oral two times a day  levETIRAcetam 750 milliGRAM(s) Oral two times a day  linezolid  IVPB      linezolid  IVPB 600 milliGRAM(s) IV Intermittent every 12 hours  melatonin 3 milliGRAM(s) Oral at bedtime  meropenem  IVPB      meropenem  IVPB 1000 milliGRAM(s) IV Intermittent every 8 hours  midodrine 10 milliGRAM(s) Oral every 8 hours  norepinephrine Infusion 0.05 MICROgram(s)/kG/Min IV Continuous <Continuous>  ondansetron Injectable 4 milliGRAM(s) IV Push every 8 hours PRN  pantoprazole   Suspension 40 milliGRAM(s) Enteral Tube daily  raloxifene 60 milliGRAM(s) Oral daily  senna 2 Tablet(s) Oral at bedtime      1. Acute Hypoxemic Respiratory Failure  and Septic shock (POA) secondary to pneumonia  possible aspiration/ recurrent admission. Hx Bacteremia with ESBL Proteus and VR E fecalis and fecium. hx of down syndrome . Hx of Cerebral Palsy/ Nonverbal at baseline   - Admit to SDU                       - CTH:No change                - LA remain elevated . Repeat LA  - Given LR bolus now on IVF LR . Give another bolus of LR   - Cont levophed . also on midodrine   * Recent ECHO with normal EF, goal directed fluid resuscitation  - Cont Zyvox and meropenem   - Blood cx:pending            - Sputum cx:pending   - Start Hydrocortisone 50mg IV q6hr   - Chest PT via vest   - AVAP trial   - CTA chest:   a) reported Diffuse, near complete left lower lobe and upper lobe mixed consolidation and atelectasis, with some remaining aeration to left upper   lobe.   b) Occlusion of right mainstem bronchus; correlation for aspiration suggested.   c) Near complete resolution of right lung airspace opacification, with  few residual likely inflammatory/infectious ground-glass airspace opacities.   d) No evidence of acute pulmonary embolism.  - Critical care consult appreciated   - ID consult :pending   - Palliative care consult:pending       2.Seizure  - Cont home meds      3. Hx of duodena perforation  - Cont PPI     4. DVT px Lovenox     Poor prognosis   DNR/I asper MOLST COPY IN 2/2024 - Palliative team consult.   * last admission there was discussion abt CMO if pt has irreversible disease

## 2024-06-28 NOTE — OCCUPATIONAL THERAPY INITIAL EVALUATION ADULT - RANGE OF MOTION EXAMINATION, UPPER EXTREMITY
BUE shoulder contractures, able to achieve ~1/3 PROM, elbows ~3/4 extension, wrist/digits ~3/4 - staff member reports this to be recent baseline - noted with twitching of L hand/wrist also baseline per staff. minimal AROM noted. b/l LE significant contractures (flexion) - staff member aware of how to perform PROM to all joints/planes and will perform with pt while in hospital

## 2024-06-28 NOTE — OCCUPATIONAL THERAPY INITIAL EVALUATION ADULT - ADDITIONAL COMMENTS
as per group home staff, ~4 months ago pt was able to assist with feeding herself. pt was full body lift OOB to w/c, dependent with w/c mobility, dependent with UB and LB dressing and bathing. pt full body lift to supportive shower chair for bathing. after that, pt was admitted to hospital for extended time and recently returned to home ~2 weeks ago, since that time she has been unable to assist with any ADLs and has been a full body lift to w/c (limited times).  no longer feeds herself- PEG tube, does track to voices/people in home.

## 2024-06-28 NOTE — PROGRESS NOTE ADULT - ASSESSMENT
IMPRESSION:    Acute hypoxemic respiratory failure  PNA possible aspiration/ recurrent admission  Sepsis POA  Septic shock  HO DVT  HO GI bleed  HO OM  HO recent duodenal perforation   HO polymicrobial bacteremia   H/o CP, DS  H/o seizures    PLAN:    CNS:  Avoid CNS Depressant, AED.    HEENT: Oral care. Eye drops.    PULMONARY: HOB at 45 degrees.  Aspiration precaution. Wean FiO2 Keep spo2 92 TO 96%. CHEST PT. Nebs q6 . Keep AVAPS/ HHFNC trial     CARDIOVASCULAR: Keep MAP more than 60. Avoid overload, taper pressors, midodrine    GI: Protonix. feeding    INFECTIOUS DISEASE:  ABX ( vanco/ danna)    HEMATOLOGICAL:  DVT px, Monitor CBC. LE doppler p    ENDOCRINE:  Follow up FS.  Insulin protocol if needed.    MUSCULOSKELETAL: Bedrest.  Off loading.  Wound care.    Prognosis very poor.  Palliative care  dnr/i,   sdu

## 2024-06-28 NOTE — OCCUPATIONAL THERAPY INITIAL EVALUATION ADULT - MUSCLE TONE ASSESSMENT, REHAB EVAL
bilateral upper extremities/bilateral lower extremities/moderately increased tone/severely increased tone

## 2024-06-28 NOTE — PROGRESS NOTE ADULT - ASSESSMENT
57 yo F with hx of down syndrome, anemia, cerebral palsy, seizure disorder, osteoporosis, hypotension on midodrine, nonverbal at baseline who was BIBEMS from group home for respiratory distress with complaints of fever and hypoxia likely 2/2 to bilateral basilar pneumonia possibly from aspiration. On arrival patient was tachycardic to 137. Was febrile to 102.4 F and was tachypneic to 30s. The patient is on AVAPS with trial of HFNC. Admitted to medicine stepdown on pressors and IV abx. Patient has had frequent admissions for similar issues (this is her 9th admission in 12 months). She resides at a group home, and is DNR/DNI.    1. Acute Hypoxemic Respiratory Failure  and Septic shock (POA) secondary to pneumonia  possible aspiration/ recurrent admission.   Hx Bacteremia with ESBL Proteus and VR E fecalis and fecium  hx of down syndrome  Hx of Cerebral Palsy/ Nonverbal at baseline   - Admit to SDU                       - CTH:No change                - LA remain elevated . Repeat LA  - Given LR bolus now on IVF LR . Give another bolus of LR   - Cont levophed . also on midodrine   * Recent ECHO with normal EF, goal directed fluid resuscitation  - Cont Zyvox and meropenem   - Blood cx:pending            - Sputum cx:pending   - Start Hydrocortisone 50mg IV q6hr   - Chest PT via vest   - AVAP trial   - CTA chest:   a) reported Diffuse, near complete left lower lobe and upper lobe mixed consolidation and atelectasis, with some remaining aeration to left upper   lobe.   b) Occlusion of right mainstem bronchus; correlation for aspiration suggested.   c) Near complete resolution of right lung airspace opacification, with  few residual likely inflammatory/infectious ground-glass airspace opacities.   d) No evidence of acute pulmonary embolism.  - Critical care consult appreciated   - ID consult :pending   - Palliative care consult: patient is already DNR/DNI, MOLST checklist completed on prior admission with CAB and MHLS    2.Seizure  - Cont home meds    3. Hx of duodena perforation  - Cont PPI     4. DVT px Lovenox     Poor prognosis   DNR/I asper MOLST COPY IN 2/2024 - Palliative team consult.   * last admission there was discussion abt CMO if pt has irreversible disease

## 2024-06-28 NOTE — PROGRESS NOTE ADULT - SUBJECTIVE AND OBJECTIVE BOX
Over Night Events: events noted, on NIV, CXR reviewed, low grade fever, on levophed 0.11    PHYSICAL EXAM    ICU Vital Signs Last 24 Hrs  T(C): 37.2 (28 Jun 2024 00:14), Max: 38.2 (27 Jun 2024 18:25)  T(F): 98.9 (28 Jun 2024 00:14), Max: 100.8 (27 Jun 2024 18:25)  HR: 88 (28 Jun 2024 00:14) (61 - 130)  BP: 115/59 (28 Jun 2024 00:14) (79/51 - 147/98)  BP(mean): 76 (27 Jun 2024 18:25) (61 - 112)  RR: 18 (28 Jun 2024 00:14) (18 - 20)  SpO2: 100% (28 Jun 2024 00:14) (95% - 100%)    O2 Parameters below as of 28 Jun 2024 00:14  Patient On (Oxygen Delivery Method): BiPAP/CPAP            General: ill looking  Lungs: dec bs l side  Cardiovascular: Regular   Abdomen: Soft, Positive BS  Extremities: No clubbing   not following commands      06-26-24 @ 07:01  -  06-27-24 @ 07:00  --------------------------------------------------------  IN:    Norepinephrine: 0.2 mL  Total IN: 0.2 mL    OUT:  Total OUT: 0 mL    Total NET: 0.2 mL      06-27-24 @ 07:01  -  06-28-24 @ 05:55  --------------------------------------------------------  IN:    Norepinephrine: 0.2 mL  Total IN: 0.2 mL    OUT:    Voided (mL): 1000 mL  Total OUT: 1000 mL    Total NET: -999.8 mL          LABS:                          12.1   21.34 )-----------( 437      ( 26 Jun 2024 19:10 )             37.7                                               06-26    140  |  101  |  19  ----------------------------<  183<H>  4.4   |  26  |  0.7    Ca    8.9      26 Jun 2024 19:10    TPro  7.9  /  Alb  3.6  /  TBili  0.4  /  DBili  x   /  AST  36  /  ALT  34  /  AlkPhos  130<H>  06-26      PT/INR - ( 26 Jun 2024 19:10 )   PT: 13.20 sec;   INR: 1.16 ratio         PTT - ( 26 Jun 2024 19:10 )  PTT:26.7 sec                                       Urinalysis Basic - ( 26 Jun 2024 22:10 )    Color: Yellow / Appearance: Turbid / SG: >1.030 / pH: x  Gluc: x / Ketone: Trace mg/dL  / Bili: Negative / Urobili: 0.2 mg/dL   Blood: x / Protein: 100 mg/dL / Nitrite: Negative   Leuk Esterase: Large / RBC: 38 /HPF / WBC 64 /HPF   Sq Epi: x / Non Sq Epi: 0 /HPF / Bacteria: Occasional /HPF                                                  LIVER FUNCTIONS - ( 26 Jun 2024 19:10 )  Alb: 3.6 g/dL / Pro: 7.9 g/dL / ALK PHOS: 130 U/L / ALT: 34 U/L / AST: 36 U/L / GGT: x                                                  Urinalysis with Rflx Culture (collected 26 Jun 2024 22:10)    Culture - Blood (collected 26 Jun 2024 19:14)  Source: .Blood Blood-Peripheral  Preliminary Report (27 Jun 2024 23:03):    No growth at 24 hours    Culture - Blood (collected 26 Jun 2024 19:14)  Source: .Blood Blood-Peripheral  Preliminary Report (27 Jun 2024 23:03):    No growth at 24 hours                                                                                       ABG - ( 27 Jun 2024 11:29 )  pH, Arterial: 7.42  pH, Blood: x     /  pCO2: 43    /  pO2: 177   / HCO3: 28    / Base Excess: 3.0   /  SaO2: 99.9                MEDICATIONS  (STANDING):  albuterol/ipratropium for Nebulization 3 milliLiter(s) Nebulizer every 6 hours  calcium carbonate   1250 mG (OsCal) 1 Tablet(s) Oral daily  doxycycline IVPB 100 milliGRAM(s) IV Intermittent every 12 hours  enoxaparin Injectable 30 milliGRAM(s) SubCutaneous every 24 hours  gabapentin 300 milliGRAM(s) Oral two times a day  levETIRAcetam 750 milliGRAM(s) Oral two times a day  linezolid  IVPB      linezolid  IVPB 600 milliGRAM(s) IV Intermittent every 12 hours  melatonin 3 milliGRAM(s) Oral at bedtime  meropenem  IVPB      meropenem  IVPB 1000 milliGRAM(s) IV Intermittent every 8 hours  midodrine 10 milliGRAM(s) Oral every 8 hours  norepinephrine Infusion 0.05 MICROgram(s)/kG/Min (3.84 mL/Hr) IV Continuous <Continuous>  pantoprazole   Suspension 40 milliGRAM(s) Enteral Tube daily  raloxifene 60 milliGRAM(s) Oral daily  senna 2 Tablet(s) Oral at bedtime    MEDICATIONS  (PRN):  acetaminophen     Tablet .. 650 milliGRAM(s) Oral every 6 hours PRN Temp greater or equal to 38C (100.4F), Mild Pain (1 - 3)  aluminum hydroxide/magnesium hydroxide/simethicone Suspension 30 milliLiter(s) Oral every 4 hours PRN Dyspepsia  ondansetron Injectable 4 milliGRAM(s) IV Push every 8 hours PRN Nausea and/or Vomiting      cxr NOTED

## 2024-06-28 NOTE — OCCUPATIONAL THERAPY INITIAL EVALUATION ADULT - GENERAL OBSERVATIONS, REHAB EVAL
Pt received semi cage in crit care area of ED, +tele, +BP cuff, +pulse oxi, + PEG tube, +Venti maxk 60%, respiratory therapist at bedside, RN at bedside, group home staff at bedside, pt awake, tracks to auditory stim, staff member agreeable to OT consult and provided history, left as found, soiled, RN present to perform hygiene.

## 2024-06-28 NOTE — OCCUPATIONAL THERAPY INITIAL EVALUATION ADULT - REHAB POTENTIAL, OT EVAL
pt with no skilled inpatient OT needs- re-educated group hoem staff reL importance of PROM and positioning with good understanding. d/c OT at this time

## 2024-06-28 NOTE — OCCUPATIONAL THERAPY INITIAL EVALUATION ADULT - NSOTDISCHREC_GEN_A_CORE
Pt dependent - vipin return to group home, however since pt was gold to participate in some BADLs prior to initial hospitalization - may benefit from home OT on d/c

## 2024-06-28 NOTE — OCCUPATIONAL THERAPY INITIAL EVALUATION ADULT - LEVEL OF INDEPENDENCE: BED TO CHAIR, REHAB EVAL
not appropriate to assess at this time - pt also unable to tolerate bed in chair 2* to contractures.

## 2024-06-28 NOTE — PROGRESS NOTE ADULT - SUBJECTIVE AND OBJECTIVE BOX
HPI: 58F with PMH including CP, anemia, Down's syndrome, seizures, OP, hypotension, nonverbal at abseline, here with respiratory distress, and found to have acute hypoxic respiratory failure from PNA, possibly from aspiration, admitted to medicine stepdown on pressors and IV abx. Patient has had frequent admissions for similar issues (this is her 9th admission in 12 months). She resides at a group home, and is DNR/DNI. Palliative consulted for [facility].    INTERVAL EVENTS  6/28: patient appears comfortable, transitioned off NIV to venti mask 40%    ADVANCE DIRECTIVES:    [ ] Full Code [X ] DNR  MOLST with checklist  [ X]  Living Will  [ ]   DECISION MAKER(s):  [ ] Health Care Proxy(s)  [ ] Surrogate(s)  [ ] Guardian           Name(s): Phone Number(s):    BASELINE (I)ADL(s) (prior to admission):  [place]: [ ]Total  [ ] Moderate [ ]Dependent  Palliative Performance Status Version 2:         %    [email or web address]    Allergies    chlorhexidine containing compounds (Rash (Mild to Mod))    Intolerances    MEDICATIONS  (STANDING):  acetaminophen   IVPB .. 625 milliGRAM(s) IV Intermittent once  albuterol/ipratropium for Nebulization 3 milliLiter(s) Nebulizer every 6 hours  calcium carbonate   1250 mG (OsCal) 1 Tablet(s) Oral daily  doxycycline IVPB 100 milliGRAM(s) IV Intermittent every 12 hours  enoxaparin Injectable 30 milliGRAM(s) SubCutaneous every 24 hours  gabapentin 300 milliGRAM(s) Oral two times a day  hydrocortisone sodium succinate Injectable 50 milliGRAM(s) IV Push every 6 hours  lactated ringers Bolus 500 milliLiter(s) IV Bolus once  levETIRAcetam 750 milliGRAM(s) Oral two times a day  linezolid  IVPB 600 milliGRAM(s) IV Intermittent every 12 hours  linezolid  IVPB      magnesium sulfate  IVPB 2 Gram(s) IV Intermittent once  melatonin 3 milliGRAM(s) Oral at bedtime  meropenem  IVPB      meropenem  IVPB 1000 milliGRAM(s) IV Intermittent every 8 hours  midodrine 10 milliGRAM(s) Oral every 8 hours  norepinephrine Infusion 0.05 MICROgram(s)/kG/Min (3.84 mL/Hr) IV Continuous <Continuous>  pantoprazole   Suspension 40 milliGRAM(s) Enteral Tube daily  raloxifene 60 milliGRAM(s) Oral daily  senna 2 Tablet(s) Oral at bedtime    MEDICATIONS  (PRN):  acetaminophen     Tablet .. 650 milliGRAM(s) Oral every 6 hours PRN Temp greater or equal to 38C (100.4F), Mild Pain (1 - 3)  aluminum hydroxide/magnesium hydroxide/simethicone Suspension 30 milliLiter(s) Oral every 4 hours PRN Dyspepsia  ondansetron Injectable 4 milliGRAM(s) IV Push every 8 hours PRN Nausea and/or Vomiting        PRESENT SYMPTOMS: [X ]Unable to obtain due to poor mentation   Source if other than patient:  [ ]Family   [ ]Team   [ ]All review of systems negative including pain and dyspnea unless noted below    All components of pain assessment below addressed. Blank spaces indicate that the patient did/could not complete the assessment.  Pain: [ ]yes [ ]no  QOL impact -   Location -                    Aggravating factors -  Quality -  Radiation -  Timing-  Severity (0-10 scale):  Minimal acceptable level (0-10 scale):     CPOT:    https://www.[facility].org/getattachment/xei98i75-1k5m-7t6s-5x7j-1183b0168c3k/Critical-Care-Pain-Observation-Tool-(CPOT)    PAIN AD Score: 0  [email or web address]/painad.pdf (press ctrl +  left click to view)    Dyspnea:                           [ ]None[ ]Mild [ ]Moderate [ ]Severe     Respiratory Distress Observation Scale (RDOS): 1  A score of 0 to 2 signifies little or no respiratory distress, 3 signifies mild distress, scores 4 to 6 indicate moderate distress, and scores greater than 7 signify severe distress  https://[email or web address]/sites/default/files/PDFS/890096-qkrgztbclcz-xnekhhta-enoirjnxals-ersui.pdf    Anxiety:                             [ ]None[ ]Mild [ ]Moderate [ ]Severe   Fatigue:                             [ ]None[ ]Mild [ ]Moderate [ ]Severe   Nausea:                             [ ]None[ ]Mild [ ]Moderate [ ]Severe   Loss of appetite:              [ ]None[ ]Mild [ ]Moderate [ ]Severe   Constipation:                    [ ]None[ ]Mild [ ]Moderate [ ]Severe    Other Symptoms:      Palliative Performance Status Version 2:         %    [email or web address]/[email or web address]_performance_scale_[email or web address].pdf  PHYSICAL EXAM:  Vital Signs Last 24 Hrs  T(C): 38.1 (28 Jun 2024 13:23), Max: 38.8 (28 Jun 2024 09:56)  T(F): 100.5 (28 Jun 2024 13:23), Max: 101.8 (28 Jun 2024 09:56)  HR: 118 (28 Jun 2024 13:23) (61 - 131)  BP: 122/60 (28 Jun 2024 13:23) (79/51 - 147/98)  BP(mean): 76 (27 Jun 2024 18:25) (61 - 112)  RR: 18 (28 Jun 2024 13:23) (18 - 19)  SpO2: 95% (28 Jun 2024 13:23) (95% - 100%)    Parameters below as of 28 Jun 2024 13:23  Patient On (Oxygen Delivery Method): mask, Venturi          GENERAL:  [X ] No acute distress [ ]Lethargic  [ ]Unarousable  [ ]Verbal  [ ]Non-Verbal [ ]Cachexia    BEHAVIORAL/PSYCH:  [ ]Alert and Oriented x  [ ] Anxiety [ ] Delirium [ ] Agitation [X ] Calm   EYES: [X No scleral icterus [ ] Scleral icterus [ ] Closed  ENMT:  [ ]Dry mouth  [ ]No external oral lesions [ X] No external ear or nose lesions  CARDIOVASCULAR:  [ ]Regular [ ]Irregular [ ]Tachy [ ]Not Tachy  [ ]Raheem [ ] Edema [ ] No edema  PULMONARY:  [ ]Tachypnea  [ ]Audible excessive secretions [X ] No labored breathing [ ] labored breathing  GASTROINTESTINAL: [ ]Soft  [ ]Distended  [ X]Not distended [ ]Non tender [ ]Tender  MUSCULOSKELETAL: [ ]No clubbing [ ] clubbing  [ X] No cyanosis [ ] cyanosis  NEUROLOGIC: [ ]No focal deficits  [ ]Follows commands  [ ]Does not follow commands  [ X]Cognitive impairment  [ ]Dysphagia  [ ]Dysarthria  [ ]Paresis   SKIN: [ ] Jaundiced [X ] Non-jaundiced [ ]Rash [ ]No Rash [ ] Warm [ ] Dry  MISC/LINES: [ ] ET tube [ ] Trach [ ]NGT/OGT [ ]PEG [ ]Madsen    CRITICAL CARE:  [ ] Shock Present  [ ]Septic [ ]Cardiogenic [ ]Neurologic [ ]Hypovolemic  [ ]  Vasopressors [ ]  Inotropes   [ ]Respiratory failure present [ ]Mechanical ventilation [ ]Non-invasive ventilatory support [ ]High flow  [ ]Acute  [ ]Chronic [ ]Hypoxic  [ ]Hypercarbic [ ]Other  [ ]Other organ failure     LABS: reviewed by me, notable for: leukocytosis                          9.4    26.57 )-----------( 331      ( 28 Jun 2024 11:00 )             28.5     06-28    137  |  99  |  5<L>  ----------------------------<  141<H>  3.1<L>   |  28  |  0.5<L>    Ca    8.8      28 Jun 2024 11:00  Mg     1.7     06-28        RADIOLOGY & ADDITIONAL STUDIES: CXR personally reviewed by me: possible opacities    PROTEIN CALORIE MALNUTRITION PRESENT: [ ]mild [ ]moderate [ ]severe [ ]underweight [ ]morbid obesity  https://www.andeal.org/vault/2440/web/files/ONC/Table_Clinical%20Characteristics%20to%20Document%20Malnutrition-White%20JV%20et%20al%188145.pdf    Height (cm): 134 (06-26-24 @ 19:00), 134 (05-31-24 @ 18:19), 134 (05-14-24 @ 02:54)  Weight (kg): 41 (06-26-24 @ 19:01), 40 (05-31-24 @ 19:53), 42.7 (05-14-24 @ 02:54)  BMI (kg/m2): 22.8 (06-26-24 @ 19:01), 22.3 (06-26-24 @ 19:00), 22.3 (05-31-24 @ 19:53)    [ ]PPSV2 < or = to 30% [ ]significant weight loss  [ ]poor nutritional intake  [ ]anasarca      [ ]Artificial Nutrition          Palliative Care Spiritual/Emotional Screening Tool Question  Severity (0-4):                    OR                    [X ] Unable to determine/NA  Score of 2 or greater indicates recommendation of Chaplaincy referral  Chaplaincy Referral: [ ] Yes [ ] Refused [ ] Following     Caregiver Reeves:  [ ] Yes [ ] No    OR    [x ] Unable to determine. Will assess at later time if appropriate.  Social Work Referral [ ]  Patient and Family Centered Care Referral [ ]    Anticipatory Grief Present: [ ] Yes [ ] No    OR     [ x] Unable to determine. Will assess at later time if appropriate.  Social Work Referral [ ]  Patient and Family Centered Care Referral [ ]    REFERRALS:   [ ]Chaplaincy  [ ]Hospice  [ ]Child Life  [ ]Social Work  [ ]Case management [ ]Holistic Therapy     Palliative care education provided to patient and/or family    Goals of Care Document:     ______________  Wu Jenkins MD  Palliative Medicine  [facility]   of Geriatric and Palliative Medicine  [phone]

## 2024-06-28 NOTE — OCCUPATIONAL THERAPY INITIAL EVALUATION ADULT - PERTINENT HX OF CURRENT PROBLEM, REHAB EVAL
Acute Hypoxemic Respiratory Failure  and Septic shock (POA) secondary to pneumonia  possible aspiration/ recurrent admission. Hx Bacteremia with ESBL Proteus and VR E fecalis and fecium. hx of down syndrome . Hx of Cerebral Palsy/ Nonverbal at baseline

## 2024-06-28 NOTE — CONSULT NOTE ADULT - ASSESSMENT
ASSESSMENT  59 yo F with hx of down syndrome, anemia, cerebral palsy, seizure disorder, osteoporosis, hypotension on midodrine, nonverbal at baseline who was BIBEMS from group home for respiratory distress with complaints of fever and hypoxia.     IMPRESSION  #Septic Shock  #Acute Hypoxemic Respiratory failure   #Severe bacterial pneumonia, possible GN pneumonia   #Cerebral Palsy  #seizure Disorder    #DM   #Abx allergy:     RECOMMENDATIONS  - continue Linezolid 600 mg q 12 hours   - continue meropenem 1g q 8hours   - can stop doxycycline as low suspicion for atypical infection   - check MRSA nares   - at risk for MDR, please call/message if with worsening hypotension/increasing lactate  - follow-up blood cx     Please call or message on [employer] Teams if with any questions.  Spectra [phone]

## 2024-06-28 NOTE — OCCUPATIONAL THERAPY INITIAL EVALUATION ADULT - NS ASR FOLLOW COMMAND OT EVAL
unable to follow- staff member states pt not able to follow commands baseline but used to assist with tactile cuing and initiation

## 2024-06-28 NOTE — OCCUPATIONAL THERAPY INITIAL EVALUATION ADULT - ASR WT BEARING STATUS EVAL
FAMILY HISTORY:  No pertinent family history in first degree relatives    
no weight-bearing restrictions

## 2024-06-28 NOTE — PROGRESS NOTE ADULT - ASSESSMENT
58F with PMH including CP, anemia, Down's syndrome, seizures, OP, hypotension, nonverbal at abseline, here with respiratory distress, and found to have acute hypoxic respiratory failure from PNA, possibly from aspiration, admitted to medicine stepdown on pressors and IV abx. Patient has had frequent admissions for similar issues (this is her 9th admission in 12 months). She resides at a group home, and is DNR/DNI. Palliative consulted for GOC.    - concern for ongoing sepsis given leukocytosis, tachycardia, c/w IV abx  - monitor respiratory status, appears comfortable with downtitration to venti mask  - GOC have been discussed in past with [facility], [facility]; patient would need an irreversible condition to warrant transition to CMO  - patient is already DNR/DNI, MOLST checklist completed on prior admission with CAB and [facility]  - will follow for symptoms, clinical assessment to determine GOC as above  - d/w RT    ______________  Wu Jenkins MD  Palliative Medicine  [facility]   of Geriatric and Palliative Medicine  [phone]

## 2024-06-28 NOTE — PROGRESS NOTE ADULT - SUBJECTIVE AND OBJECTIVE BOX
Patient is a 58y old Female who presents with a chief complaint of Sepsis. Primary diagnosis of Acute respiratory failure with hypoxia. Today is hospital day 2d. This morning patient was seen and examined at bedside.  No acute or major events overnight.    Code Status: DNR/DNI      PAST MEDICAL & SURGICAL HISTORY  Down syndrome    Osteoporosis    Mild anemia    Neuropathy    Cerebral palsy    Seizure    Nonverbal    Hypotension  on midodrine    S/P debridement  of R hip on 3/2/21    S/P percutaneous endoscopic gastrostomy (PEG) tube placement      SOCIAL HISTORY:  Social History:      ALLERGIES:  chlorhexidine containing compounds (Rash (Mild to Mod))    MEDICATIONS:  STANDING MEDICATIONS  acetaminophen   IVPB .. 625 milliGRAM(s) IV Intermittent once  albuterol/ipratropium for Nebulization 3 milliLiter(s) Nebulizer every 6 hours  calcium carbonate   1250 mG (OsCal) 1 Tablet(s) Oral daily  doxycycline IVPB 100 milliGRAM(s) IV Intermittent every 12 hours  enoxaparin Injectable 30 milliGRAM(s) SubCutaneous every 24 hours  gabapentin 300 milliGRAM(s) Oral two times a day  hydrocortisone sodium succinate Injectable 50 milliGRAM(s) IV Push every 6 hours  lactated ringers Bolus 500 milliLiter(s) IV Bolus once  levETIRAcetam 750 milliGRAM(s) Oral two times a day  linezolid  IVPB      linezolid  IVPB 600 milliGRAM(s) IV Intermittent every 12 hours  magnesium sulfate  IVPB 2 Gram(s) IV Intermittent once  melatonin 3 milliGRAM(s) Oral at bedtime  meropenem  IVPB      meropenem  IVPB 1000 milliGRAM(s) IV Intermittent every 8 hours  midodrine 10 milliGRAM(s) Oral every 8 hours  norepinephrine Infusion 0.05 MICROgram(s)/kG/Min IV Continuous <Continuous>  pantoprazole   Suspension 40 milliGRAM(s) Enteral Tube daily  raloxifene 60 milliGRAM(s) Oral daily  senna 2 Tablet(s) Oral at bedtime    PRN MEDICATIONS  acetaminophen     Tablet .. 650 milliGRAM(s) Oral every 6 hours PRN  aluminum hydroxide/magnesium hydroxide/simethicone Suspension 30 milliLiter(s) Oral every 4 hours PRN  ondansetron Injectable 4 milliGRAM(s) IV Push every 8 hours PRN    VITALS:   T(F): 100.5  HR: 118  BP: 122/60  RR: 18  SpO2: 95%    PHYSICAL EXAM:  GENERAL: NAD, lying in bed comfortably  HEAD:  Atraumatic, Normocephalic  EYES: EOMI, PERRLA, conjunctiva and sclera clear  ENT: Moist mucous membranes  NECK: Supple, No JVD  CHEST/LUNG: Clear to auscultation bilaterally; No rales, rhonchi, wheezing, or rubs. Unlabored respirations  HEART: Regular rate and rhythm; No murmurs, rubs, or gallops  ABDOMEN: BSx4; Soft, nontender, nondistended  EXTREMITIES:  2+ Peripheral Pulses, brisk capillary refill. No clubbing, cyanosis, or edema  NERVOUS SYSTEM:  A&Ox3, no focal deficits   SKIN: No rashes or lesions    (  ) Indwelling Madsen Catheter:   Date inserted:    Reason (  ) Critical illness     (  ) urinary retention    (  ) Accurate Ins/Outs Monitoring     (  ) CMO patient    (  ) Central Line:   Date inserted:  Location: (  ) Right IJ     (  ) Left IJ     (  ) Right Fem     (  ) Left Fem    (  ) SPC        (  ) pigtail       (  ) PEG tube       (  ) colostomy       (  ) jejunostomy  (  ) U-Dall    LABS:                        9.4    26.57 )-----------( 331      ( 28 Jun 2024 11:00 )             28.5     06-28    137  |  99  |  5<L>  ----------------------------<  141<H>  3.1<L>   |  28  |  0.5<L>    Ca    8.8      28 Jun 2024 11:00  Mg     1.7     06-28    TPro  6.1  /  Alb  2.9<L>  /  TBili  0.4  /  DBili  x   /  AST  15  /  ALT  15  /  AlkPhos  96  06-28    PT/INR - ( 26 Jun 2024 19:10 )   PT: 13.20 sec;   INR: 1.16 ratio         PTT - ( 26 Jun 2024 19:10 )  PTT:26.7 sec  Urinalysis Basic - ( 28 Jun 2024 11:00 )    Color: x / Appearance: x / SG: x / pH: x  Gluc: 141 mg/dL / Ketone: x  / Bili: x / Urobili: x   Blood: x / Protein: x / Nitrite: x   Leuk Esterase: x / RBC: x / WBC x   Sq Epi: x / Non Sq Epi: x / Bacteria: x      ABG - ( 27 Jun 2024 11:29 )  pH, Arterial: 7.42  pH, Blood: x     /  pCO2: 43    /  pO2: 177   / HCO3: 28    / Base Excess: 3.0   /  SaO2: 99.9              Lactate, Blood: 2.7 mmol/L *H* (06-28-24 @ 11:00)      Urinalysis with Rflx Culture (collected 26 Jun 2024 22:10)    Culture - Blood (collected 26 Jun 2024 19:14)  Source: .Blood Blood-Peripheral  Preliminary Report (27 Jun 2024 23:03):    No growth at 24 hours    Culture - Blood (collected 26 Jun 2024 19:14)  Source: .Blood Blood-Peripheral  Preliminary Report (27 Jun 2024 23:03):    No growth at 24 hours          RADIOLOGY:  CXR  Xray Chest 1 View- PORTABLE-Urgent:   ACC: [id number] EXAM:  XR CHEST PORTABLE URGENT 1V   ORDERED BY: CELESTE ESTRADA     PROCEDURE DATE:  06/26/2024          INTERPRETATION:  Clinical History / Reason for exam: Shortness of breath    Comparison : Chest radiograph 6/12/2024.    Technique/Positioning: Single frontal chest x-ray obtained.    Findings:    Support devices: None.    Cardiac/mediastinum/hilum: Unchanged.    Lung parenchyma/Pleura: Bilateral parenchymal opacities better   appreciated on subsequent CT    Skeleton/soft tissues: Unchanged. Hiatal hernia.    Impression:  Bilateral parenchymal opacities better appreciated on subsequent CT.  Hiatal hernia.    --- End of Report ---            SHYANN GREENE MD; Attending Radiologist  This document has been electronically signed. Jun 27 2024  7:07AM (06-26-24 @ 19:47)      CT  CT Angio Chest PE Protocol w/ IV Cont:   ACC: [id number] EXAM:  CT ANGIO CHEST PULM ART [facility]   ORDERED BY: DAMARIS HUMPHREY     PROCEDURE DATE:  06/27/2024          INTERPRETATION:  CLINICAL HISTORY/REASON FOR EXAM: Shortness of breath.    TECHNIQUE: Multislice helical sections were obtainedfrom the thoracic   inlet to the lung bases during rapid administration of 65 cc Omnipaque   350 intravenous contrast. Thin sections were reconstructed through the   pulmonary vasculature. 3D (MIP) reformats obtained. 35 cc contrast   discarded    COMPARISON: CT chest 5/6/2024, 4/24/2024.    FINDINGS:    PULMONARY EMBOLUS: No evidence of acute pulmonary embolism.    LUNGS, PLEURA, AIRWAYS: Diffuse, near complete left lower lobe and upper   lobe mixed consolidation and atelectasis, with some remaining aeration to   left upper lobe. Occlusion of right mainstem bronchus; correlation for   aspiration suggested. Left thoracic volume loss due to atelectasis.    Near complete resolution of right lung airspace opacification, with few   residual likely inflammatory/infectious groundglass airspace opacities.   Right basilar subsegmental atelectasis.    Trace left pleural effusion. No pneumothorax.    THORACIC NODES: Prominent likely reactive mediastinal lymph nodes    MEDIASTINUM/GREAT VESSELS: No pericardial effusion. Heart size is within   normal limits. The aorta and main pulmonary artery are of normal caliber.   Moderate hiatal hernia.    BONES/SOFT TISSUES: Unremarkable.    VISUALIZED UPPER ABDOMEN: Unremarkable.      IMPRESSION:      1. Diffuse, near complete left lower lobe and upper lobe mixed   consolidation and atelectasis, with some remaining aeration to left upper   lobe. Occlusion of right mainstem bronchus; correlation for aspiration   suggested.    2. Near complete resolutionof right lung airspace opacification, with   few residual likely inflammatory/infectious groundglass airspace   opacities.    3. No evidence of acute pulmonary embolism.    --- End of Report ---            SHYANN GREENE MD; Attending Radiologist  This document has been electronically signed. Jun 27 2024  3:29AM (06-27-24 @ 03:11)             Patient is a 58y old Female who presents with a chief complaint of Sepsis. Primary diagnosis of Acute respiratory failure with hypoxia. Today is hospital day 2d. This morning patient was seen and examined at bedside.  No acute or major events overnight.    Code Status: DNR/DNI      PAST MEDICAL & SURGICAL HISTORY  Down syndrome    Osteoporosis    Mild anemia    Neuropathy    Cerebral palsy    Seizure    Nonverbal    Hypotension  on midodrine    S/P debridement  of R hip on 3/2/21    S/P percutaneous endoscopic gastrostomy (PEG) tube placement      SOCIAL HISTORY:  Social History:      ALLERGIES:  chlorhexidine containing compounds (Rash (Mild to Mod))    MEDICATIONS:  STANDING MEDICATIONS  acetaminophen   IVPB .. 625 milliGRAM(s) IV Intermittent once  albuterol/ipratropium for Nebulization 3 milliLiter(s) Nebulizer every 6 hours  calcium carbonate   1250 mG (OsCal) 1 Tablet(s) Oral daily  doxycycline IVPB 100 milliGRAM(s) IV Intermittent every 12 hours  enoxaparin Injectable 30 milliGRAM(s) SubCutaneous every 24 hours  gabapentin 300 milliGRAM(s) Oral two times a day  hydrocortisone sodium succinate Injectable 50 milliGRAM(s) IV Push every 6 hours  lactated ringers Bolus 500 milliLiter(s) IV Bolus once  levETIRAcetam 750 milliGRAM(s) Oral two times a day  linezolid  IVPB      linezolid  IVPB 600 milliGRAM(s) IV Intermittent every 12 hours  magnesium sulfate  IVPB 2 Gram(s) IV Intermittent once  melatonin 3 milliGRAM(s) Oral at bedtime  meropenem  IVPB      meropenem  IVPB 1000 milliGRAM(s) IV Intermittent every 8 hours  midodrine 10 milliGRAM(s) Oral every 8 hours  norepinephrine Infusion 0.05 MICROgram(s)/kG/Min IV Continuous <Continuous>  pantoprazole   Suspension 40 milliGRAM(s) Enteral Tube daily  raloxifene 60 milliGRAM(s) Oral daily  senna 2 Tablet(s) Oral at bedtime    PRN MEDICATIONS  acetaminophen     Tablet .. 650 milliGRAM(s) Oral every 6 hours PRN  aluminum hydroxide/magnesium hydroxide/simethicone Suspension 30 milliLiter(s) Oral every 4 hours PRN  ondansetron Injectable 4 milliGRAM(s) IV Push every 8 hours PRN    VITALS:   T(F): 100.5  HR: 118  BP: 122/60  RR: 18  SpO2: 95%    PHYSICAL EXAM:  GENERAL: NAD, lying in bed comfortably  HEAD:  Atraumatic, Normocephalic  EYES: EOMI, PERRLA, conjunctiva and sclera clear  ENT: Moist mucous membranes  NECK: Supple, No JVD  CHEST/LUNG: Clear to auscultation bilaterally; No rales, rhonchi, wheezing, or rubs. Unlabored respirations  HEART: Regular rate and rhythm; No murmurs, rubs, or gallops  ABDOMEN: BSx4; Soft, nontender, nondistended  EXTREMITIES:  2+ Peripheral Pulses, brisk capillary refill. No clubbing, cyanosis, or edema  NERVOUS SYSTEM:  A&Ox3, no focal deficits   SKIN: No rashes or lesions      LABS:                        9.4    26.57 )-----------( 331      ( 28 Jun 2024 11:00 )             28.5     06-28    137  |  99  |  5<L>  ----------------------------<  141<H>  3.1<L>   |  28  |  0.5<L>    Ca    8.8      28 Jun 2024 11:00  Mg     1.7     06-28    TPro  6.1  /  Alb  2.9<L>  /  TBili  0.4  /  DBili  x   /  AST  15  /  ALT  15  /  AlkPhos  96  06-28    PT/INR - ( 26 Jun 2024 19:10 )   PT: 13.20 sec;   INR: 1.16 ratio         PTT - ( 26 Jun 2024 19:10 )  PTT:26.7 sec  Urinalysis Basic - ( 28 Jun 2024 11:00 )    Color: x / Appearance: x / SG: x / pH: x  Gluc: 141 mg/dL / Ketone: x  / Bili: x / Urobili: x   Blood: x / Protein: x / Nitrite: x   Leuk Esterase: x / RBC: x / WBC x   Sq Epi: x / Non Sq Epi: x / Bacteria: x      ABG - ( 27 Jun 2024 11:29 )  pH, Arterial: 7.42  pH, Blood: x     /  pCO2: 43    /  pO2: 177   / HCO3: 28    / Base Excess: 3.0   /  SaO2: 99.9              Lactate, Blood: 2.7 mmol/L *H* (06-28-24 @ 11:00)      Urinalysis with Rflx Culture (collected 26 Jun 2024 22:10)    Culture - Blood (collected 26 Jun 2024 19:14)  Source: .Blood Blood-Peripheral  Preliminary Report (27 Jun 2024 23:03):    No growth at 24 hours    Culture - Blood (collected 26 Jun 2024 19:14)  Source: .Blood Blood-Peripheral  Preliminary Report (27 Jun 2024 23:03):    No growth at 24 hours          RADIOLOGY:  CXR  Xray Chest 1 View- PORTABLE-Urgent:   ACC: [id number] EXAM:  XR CHEST PORTABLE URGENT 1V   ORDERED BY: CELESTE ESTRADA     PROCEDURE DATE:  06/26/2024          INTERPRETATION:  Clinical History / Reason for exam: Shortness of breath    Comparison : Chest radiograph 6/12/2024.    Technique/Positioning: Single frontal chest x-ray obtained.    Findings:    Support devices: None.    Cardiac/mediastinum/hilum: Unchanged.    Lung parenchyma/Pleura: Bilateral parenchymal opacities better   appreciated on subsequent CT    Skeleton/soft tissues: Unchanged. Hiatal hernia.    Impression:  Bilateral parenchymal opacities better appreciated on subsequent CT.  Hiatal hernia.    --- End of Report ---            SHYANN GREENE MD; Attending Radiologist  This document has been electronically signed. Jun 27 2024  7:07AM (06-26-24 @ 19:47)      CT  CT Angio Chest PE Protocol w/ IV Cont:   ACC: [id number] EXAM:  CT ANGIO CHEST PULM ART [facility]   ORDERED BY: DAMARIS HUMPHREY     PROCEDURE DATE:  06/27/2024          INTERPRETATION:  CLINICAL HISTORY/REASON FOR EXAM: Shortness of breath.    TECHNIQUE: Multislice helical sections were obtainedfrom the thoracic   inlet to the lung bases during rapid administration of 65 cc Omnipaque   350 intravenous contrast. Thin sections were reconstructed through the   pulmonary vasculature. 3D (MIP) reformats obtained. 35 cc contrast   discarded    COMPARISON: CT chest 5/6/2024, 4/24/2024.    FINDINGS:    PULMONARY EMBOLUS: No evidence of acute pulmonary embolism.    LUNGS, PLEURA, AIRWAYS: Diffuse, near complete left lower lobe and upper   lobe mixed consolidation and atelectasis, with some remaining aeration to   left upper lobe. Occlusion of right mainstem bronchus; correlation for   aspiration suggested. Left thoracic volume loss due to atelectasis.    Near complete resolution of right lung airspace opacification, with few   residual likely inflammatory/infectious groundglass airspace opacities.   Right basilar subsegmental atelectasis.    Trace left pleural effusion. No pneumothorax.    THORACIC NODES: Prominent likely reactive mediastinal lymph nodes    MEDIASTINUM/GREAT VESSELS: No pericardial effusion. Heart size is within   normal limits. The aorta and main pulmonary artery are of normal caliber.   Moderate hiatal hernia.    BONES/SOFT TISSUES: Unremarkable.    VISUALIZED UPPER ABDOMEN: Unremarkable.      IMPRESSION:      1. Diffuse, near complete left lower lobe and upper lobe mixed   consolidation and atelectasis, with some remaining aeration to left upper   lobe. Occlusion of right mainstem bronchus; correlation for aspiration   suggested.    2. Near complete resolutionof right lung airspace opacification, with   few residual likely inflammatory/infectious groundglass airspace   opacities.    3. No evidence of acute pulmonary embolism.    --- End of Report ---            SHYANN GREENE MD; Attending Radiologist  This document has been electronically signed. Jun 27 2024  3:29AM (06-27-24 @ 03:11)

## 2024-06-29 LAB
ANION GAP SERPL CALC-SCNC: 14 MMOL/L — SIGNIFICANT CHANGE UP (ref 7–14)
BUN SERPL-MCNC: 7 MG/DL — LOW (ref 10–20)
CALCIUM SERPL-MCNC: 8.7 MG/DL — SIGNIFICANT CHANGE UP (ref 8.4–10.5)
CHLORIDE SERPL-SCNC: 102 MMOL/L — SIGNIFICANT CHANGE UP (ref 98–110)
CO2 SERPL-SCNC: 27 MMOL/L — SIGNIFICANT CHANGE UP (ref 17–32)
CREAT SERPL-MCNC: <0.5 MG/DL — LOW (ref 0.7–1.5)
EGFR: 115 ML/MIN/1.73M2 — SIGNIFICANT CHANGE UP
GLUCOSE BLDC GLUCOMTR-MCNC: 134 MG/DL — HIGH (ref 70–99)
GLUCOSE SERPL-MCNC: 134 MG/DL — HIGH (ref 70–99)
HCT VFR BLD CALC: 29.7 % — LOW (ref 37–47)
HGB BLD-MCNC: 9.6 G/DL — LOW (ref 12–16)
LACTATE SERPL-SCNC: 1.5 MMOL/L — SIGNIFICANT CHANGE UP (ref 0.7–2)
LEVETIRACETAM SERPL-MCNC: 161.6 UG/ML — HIGH (ref 10–40)
MAGNESIUM SERPL-MCNC: 1.9 MG/DL — SIGNIFICANT CHANGE UP (ref 1.8–2.4)
MCHC RBC-ENTMCNC: 29.8 PG — SIGNIFICANT CHANGE UP (ref 27–31)
MCHC RBC-ENTMCNC: 32.3 G/DL — SIGNIFICANT CHANGE UP (ref 32–37)
MCV RBC AUTO: 92.2 FL — SIGNIFICANT CHANGE UP (ref 81–99)
NRBC # BLD: 0 /100 WBCS — SIGNIFICANT CHANGE UP (ref 0–0)
PLATELET # BLD AUTO: 330 K/UL — SIGNIFICANT CHANGE UP (ref 130–400)
PMV BLD: 10.7 FL — HIGH (ref 7.4–10.4)
POTASSIUM SERPL-MCNC: 3.2 MMOL/L — LOW (ref 3.5–5)
POTASSIUM SERPL-SCNC: 3.2 MMOL/L — LOW (ref 3.5–5)
PROCALCITONIN SERPL-MCNC: 0.6 NG/ML — HIGH (ref 0.02–0.1)
RBC # BLD: 3.22 M/UL — LOW (ref 4.2–5.4)
RBC # FLD: 19.5 % — HIGH (ref 11.5–14.5)
SODIUM SERPL-SCNC: 143 MMOL/L — SIGNIFICANT CHANGE UP (ref 135–146)
WBC # BLD: 16.97 K/UL — HIGH (ref 4.8–10.8)
WBC # FLD AUTO: 16.97 K/UL — HIGH (ref 4.8–10.8)

## 2024-06-29 PROCEDURE — 99233 SBSQ HOSP IP/OBS HIGH 50: CPT

## 2024-06-29 RX ORDER — DEXTROSE MONOHYDRATE AND SODIUM CHLORIDE 5; .3 G/100ML; G/100ML
500 INJECTION, SOLUTION INTRAVENOUS ONCE
Refills: 0 | Status: DISCONTINUED | OUTPATIENT
Start: 2024-06-29 | End: 2024-07-01

## 2024-06-29 RX ADMIN — MIDODRINE HYDROCHLORIDE 10 MILLIGRAM(S): 10 TABLET ORAL at 23:08

## 2024-06-29 RX ADMIN — MEROPENEM 100 MILLIGRAM(S): 500 INJECTION, POWDER, FOR SOLUTION INTRAVENOUS at 14:51

## 2024-06-29 RX ADMIN — LINEZOLID 300 MILLIGRAM(S): 600 TABLET, FILM COATED ORAL at 17:49

## 2024-06-29 RX ADMIN — LEVETIRACETAM 750 MILLIGRAM(S): 100 INJECTION INTRAVENOUS at 17:34

## 2024-06-29 RX ADMIN — RALOXIFENE HYDROCHLORIDE 60 MILLIGRAM(S): 60 TABLET, FILM COATED ORAL at 11:37

## 2024-06-29 RX ADMIN — MIDODRINE HYDROCHLORIDE 10 MILLIGRAM(S): 10 TABLET ORAL at 14:52

## 2024-06-29 RX ADMIN — MEROPENEM 100 MILLIGRAM(S): 500 INJECTION, POWDER, FOR SOLUTION INTRAVENOUS at 06:15

## 2024-06-29 RX ADMIN — Medication 300 MILLIGRAM(S): at 17:35

## 2024-06-29 RX ADMIN — LINEZOLID 300 MILLIGRAM(S): 600 TABLET, FILM COATED ORAL at 06:16

## 2024-06-29 RX ADMIN — ENOXAPARIN SODIUM 30 MILLIGRAM(S): 100 INJECTION SUBCUTANEOUS at 11:38

## 2024-06-29 RX ADMIN — IPRATROPIUM BROMIDE AND ALBUTEROL SULFATE 3 MILLILITER(S): .5; 3 SOLUTION RESPIRATORY (INHALATION) at 20:21

## 2024-06-29 RX ADMIN — Medication 3 MILLIGRAM(S): at 23:08

## 2024-06-29 RX ADMIN — PANTOPRAZOLE SODIUM 40 MILLIGRAM(S): 40 INJECTION, POWDER, FOR SOLUTION INTRAVENOUS at 11:38

## 2024-06-29 RX ADMIN — HYDROCORTISONE 50 MILLIGRAM(S): 100 SUSPENSION RECTAL at 06:14

## 2024-06-29 RX ADMIN — MIDODRINE HYDROCHLORIDE 10 MILLIGRAM(S): 10 TABLET ORAL at 06:14

## 2024-06-29 RX ADMIN — DOXYCYCLINE 100 MILLIGRAM(S): 100 CAPSULE ORAL at 06:15

## 2024-06-29 RX ADMIN — MEROPENEM 100 MILLIGRAM(S): 500 INJECTION, POWDER, FOR SOLUTION INTRAVENOUS at 23:07

## 2024-06-29 RX ADMIN — HYDROCORTISONE 50 MILLIGRAM(S): 100 SUSPENSION RECTAL at 00:32

## 2024-06-29 RX ADMIN — Medication 300 MILLIGRAM(S): at 06:15

## 2024-06-29 RX ADMIN — LEVETIRACETAM 750 MILLIGRAM(S): 100 INJECTION INTRAVENOUS at 06:15

## 2024-06-29 RX ADMIN — Medication 1 TABLET(S): at 11:37

## 2024-06-29 RX ADMIN — IPRATROPIUM BROMIDE AND ALBUTEROL SULFATE 3 MILLILITER(S): .5; 3 SOLUTION RESPIRATORY (INHALATION) at 14:16

## 2024-06-29 RX ADMIN — IPRATROPIUM BROMIDE AND ALBUTEROL SULFATE 3 MILLILITER(S): .5; 3 SOLUTION RESPIRATORY (INHALATION) at 08:26

## 2024-06-29 NOTE — PROGRESS NOTE ADULT - ASSESSMENT
IMPRESSION:    Acute hypoxemic respiratory failure  PNA possible aspiration/ recurrent admission  Sepsis POA  Septic shock  HO DVT  HO GI bleed  HO OM  HO recent duodenal perforation   HO polymicrobial bacteremia   H/o CP, DS  H/o seizures    PLAN:    CNS:  Avoid CNS Depressant, AED.    HEENT: Oral care. Eye drops.    PULMONARY: HOB at 45 degrees.  Aspiration precaution. Wean FiO2 Keep spo2 92 TO 96%. CHEST PT. Nebs q6 . Keep AVAPS/ HHFNC trial     CARDIOVASCULAR: Keep MAP more than 60. Avoid overload, taper pressors, midodrine    GI: Protonix. feeding    INFECTIOUS DISEASE:  ABX ( vanco/ danna)    HEMATOLOGICAL:  DVT px, Monitor CBC. LE doppler p    ENDOCRINE:  Follow up FS.  Insulin protocol if needed.    MUSCULOSKELETAL: Bedrest.  Off loading.  Wound care.    Prognosis very poor.  Palliative care  dnr/i,   sdu IMPRESSION:    Acute hypoxemic respiratory failure  PNA possible aspiration/ recurrent admission  Sepsis POA  Septic shock  HO DVT  HO GI bleed  HO OM  HO recent duodenal perforation   HO polymicrobial bacteremia   H/o CP, DS  H/o seizures    PLAN:    CNS: CW AEDs.    HEENT: Oral care.  Eye drops.    PULMONARY:  HOB at 45 degrees.  Aspiration precaution.  Wean FiO2 Keep spo2 92 TO 96%.  CHEST PT.  Nebs q6.  Off NIV and HFNC.    CARDIOVASCULAR: Keep MAP more than 60.  Avoid overload.  Off pressors.  Goal directed fluid resuscitation.    GI: Protonix.  PEG feeding.    INFECTIOUS DISEASE:  Abx per ID.  Urine strep/legionella.  MRSA Nares.    HEMATOLOGICAL:  DVT px, Monitor CBC.  LE doppler    ENDOCRINE:  Follow up FS.  Insulin protocol if needed.  DC [place]    MUSCULOSKELETAL: Bedrest.  Off loading.  Wound care.    Prognosis very poor.  Palliative care  DNR/DNI   DGTF

## 2024-06-29 NOTE — CHART NOTE - NSCHARTNOTEFT_GEN_A_CORE
CEU Transfer Note    Transfer from: CEU  Transfer to:  MED/SURG    HOSPITAL COURSE:    MEDICATIONS:  STANDING MEDICATIONS  albuterol/ipratropium for Nebulization 3 milliLiter(s) Nebulizer every 6 hours  calcium carbonate   1250 mG (OsCal) 1 Tablet(s) Oral daily  enoxaparin Injectable 30 milliGRAM(s) SubCutaneous every 24 hours  gabapentin 300 milliGRAM(s) Oral two times a day  lactated ringers Bolus 500 milliLiter(s) IV Bolus once  levETIRAcetam 750 milliGRAM(s) Oral two times a day  linezolid  IVPB      linezolid  IVPB 600 milliGRAM(s) IV Intermittent every 12 hours  melatonin 3 milliGRAM(s) Oral at bedtime  meropenem  IVPB      meropenem  IVPB 1000 milliGRAM(s) IV Intermittent every 8 hours  midodrine 10 milliGRAM(s) Oral every 8 hours  pantoprazole   Suspension 40 milliGRAM(s) Enteral Tube daily  raloxifene 60 milliGRAM(s) Oral daily    PRN MEDICATIONS  acetaminophen     Tablet .. 650 milliGRAM(s) Oral every 6 hours PRN  aluminum hydroxide/magnesium hydroxide/simethicone Suspension 30 milliLiter(s) Oral every 4 hours PRN  ondansetron Injectable 4 milliGRAM(s) IV Push every 8 hours PRN      VITAL SIGNS: Last 24 Hours  T(C): 36.3 (29 Jun 2024 07:49), Max: 38.1 (28 Jun 2024 13:23)  T(F): 97.3 (29 Jun 2024 07:49), Max: 100.5 (28 Jun 2024 13:23)  HR: 61 (29 Jun 2024 08:29) (61 - 188)  BP: 118/56 (29 Jun 2024 07:49) (108/62 - 157/88)  BP(mean): 72 (29 Jun 2024 07:49) (72 - 112)  RR: 20 (29 Jun 2024 07:49) (18 - 20)  SpO2: 99% (29 Jun 2024 08:29) (93% - 100%)        LABS:                        9.6    16.97 )-----------( 330      ( 29 Jun 2024 05:42 )             29.7     06-29    143  |  102  |  7<L>  ----------------------------<  134<H>  3.2<L>   |  27  |  <0.5<L>    Ca    8.7      29 Jun 2024 05:42  Mg     1.9     06-29    TPro  6.1  /  Alb  2.9<L>  /  TBili  0.4  /  DBili  x   /  AST  15  /  ALT  15  /  AlkPhos  96  06-28      ABG - ( 29 Jun 2024 05:42 )  pH: x     /  pCO2: x     /  pO2: x     / HCO3: x     / Base Excess: x     /  SaO2: x       Lactate: 1.5                  Urinalysis with Rflx Culture (collected 26 Jun 2024 22:10)    Culture - Urine (collected 26 Jun 2024 22:10)  Source: Catheterized None  Final Report (28 Jun 2024 21:21):    <10,000 CFU/mL Normal Urogenital Mili    Culture - Blood (collected 26 Jun 2024 19:14)  Source: .Blood Blood-Peripheral  Preliminary Report (28 Jun 2024 23:02):    No growth at 48 Hours    Culture - Blood (collected 26 Jun 2024 19:14)  Source: .Blood Blood-Peripheral  Preliminary Report (28 Jun 2024 23:02):    No growth at 48 Hours        RADIOLOGY:      ASSESSMENT & PLAN:     For Follow-Up: CEU Transfer Note    Transfer from: CEU  Transfer to:  MED/SURG    HOSPITAL COURSE:    HPI  59 yo F with hx of down syndrome, anemia, cerebral palsy, seizure disorder, osteoporosis, hypotension on midodrine, nonverbal at baseline who was BIBEMS from group home for respiratory distress with complaints of fever and hypoxia. Per EMS, pt was hypoxic to 80s and was put on non-rebreather mask. Pt was admitted recently from 5/31 - 6/13 for septic shock and acute hypoxic and hypercapnic respiratory failure 2/2 recurrent CAP. On arrival patient was tachycardic to 137. was febrile to 102.4 F and was tachypneic to 30s. The patient was put on AVAPS with patient saturating 92 %, was given STAT dose of cefepime and vancomycin, got 1250 cc of bolus (30 cc/kg per sepsis protocol).  ABG was done which showed pH of 7.4 with pO2 of 81 and Pco2 of 42 with lactate of 2.8.     ED Course  Vitals: 102.4 F, RR 30 bpm, , 107/67, 93 % on AVAPS  Labs: WBC 21K, , Lactate 2.4, UA Turbid with Large LE, 64 WBC and Yeast like cells +nt  Imaging: CXR showing LLL infiltrate  Medications: Cefepime, Vancomycin and 1.2 L bolus  Patient being admitted to ICU for further evaluation and managment of severe sepsis.     CEU	  59 yo F with hx of down syndrome, anemia, cerebral palsy, seizure disorder, osteoporosis, hypotension on midodrine, nonverbal at baseline who was BIBEMS from group home for respiratory distress with complaints of fever and hypoxia likely 2/2 to bilateral basilar pneumonia possibly from aspiration. On arrival patient was tachycardic to 137. Was febrile to 102.4 F and was tachypneic to 30s. The patient is on AVAPS with trial of HFNC. Admitted to medicine stepdown on pressors and IV abx. Patient has had frequent admissions for similar issues (this is her 9th admission in 12 months). She resides at a group home, and is DNR/DNI. Patient was seen today 6/29 at bedside. She was hemodynamically stable and no acute events overnight. Currently onn 5L NC, will attempt to wean down today and monitor vitals. Patients tube feeds will also be resumed today. Will d/c pressors as well.       MEDICATIONS:  STANDING MEDICATIONS  albuterol/ipratropium for Nebulization 3 milliLiter(s) Nebulizer every 6 hours  calcium carbonate   1250 mG (OsCal) 1 Tablet(s) Oral daily  enoxaparin Injectable 30 milliGRAM(s) SubCutaneous every 24 hours  gabapentin 300 milliGRAM(s) Oral two times a day  lactated ringers Bolus 500 milliLiter(s) IV Bolus once  levETIRAcetam 750 milliGRAM(s) Oral two times a day  linezolid  IVPB      linezolid  IVPB 600 milliGRAM(s) IV Intermittent every 12 hours  melatonin 3 milliGRAM(s) Oral at bedtime  meropenem  IVPB      meropenem  IVPB 1000 milliGRAM(s) IV Intermittent every 8 hours  midodrine 10 milliGRAM(s) Oral every 8 hours  pantoprazole   Suspension 40 milliGRAM(s) Enteral Tube daily  raloxifene 60 milliGRAM(s) Oral daily    PRN MEDICATIONS  acetaminophen     Tablet .. 650 milliGRAM(s) Oral every 6 hours PRN  aluminum hydroxide/magnesium hydroxide/simethicone Suspension 30 milliLiter(s) Oral every 4 hours PRN  ondansetron Injectable 4 milliGRAM(s) IV Push every 8 hours PRN      VITAL SIGNS: Last 24 Hours  T(C): 36.3 (29 Jun 2024 07:49), Max: 38.1 (28 Jun 2024 13:23)  T(F): 97.3 (29 Jun 2024 07:49), Max: 100.5 (28 Jun 2024 13:23)  HR: 61 (29 Jun 2024 08:29) (61 - 188)  BP: 118/56 (29 Jun 2024 07:49) (108/62 - 157/88)  BP(mean): 72 (29 Jun 2024 07:49) (72 - 112)  RR: 20 (29 Jun 2024 07:49) (18 - 20)  SpO2: 99% (29 Jun 2024 08:29) (93% - 100%)        LABS:                        9.6    16.97 )-----------( 330      ( 29 Jun 2024 05:42 )             29.7     06-29    143  |  102  |  7<L>  ----------------------------<  134<H>  3.2<L>   |  27  |  <0.5<L>    Ca    8.7      29 Jun 2024 05:42  Mg     1.9     06-29    TPro  6.1  /  Alb  2.9<L>  /  TBili  0.4  /  DBili  x   /  AST  15  /  ALT  15  /  AlkPhos  96  06-28      ABG - ( 29 Jun 2024 05:42 )  pH: x     /  pCO2: x     /  pO2: x     / HCO3: x     / Base Excess: x     /  SaO2: x       Lactate: 1.5              Urinalysis with Rflx Culture (collected 26 Jun 2024 22:10)    Culture - Urine (collected 26 Jun 2024 22:10)  Source: Catheterized None  Final Report (28 Jun 2024 21:21):    <10,000 CFU/mL Normal Urogenital Mili    Culture - Blood (collected 26 Jun 2024 19:14)  Source: .Blood Blood-Peripheral  Preliminary Report (28 Jun 2024 23:02):    No growth at 48 Hours    Culture - Blood (collected 26 Jun 2024 19:14)  Source: .Blood Blood-Peripheral  Preliminary Report (28 Jun 2024 23:02):    No growth at 48 Hours        RADIOLOGY:      ASSESSMENT & PLAN:   59 yo F with hx of down syndrome, anemia, cerebral palsy, seizure disorder, osteoporosis, hypotension on midodrine, nonverbal at baseline who was BIBEMS from group home for respiratory distress with complaints of fever and hypoxia likely 2/2 to bilateral basilar pneumonia possibly from aspiration. On arrival patient was tachycardic to 137. Was febrile to 102.4 F and was tachypneic to 30s. The patient is on AVAPS with trial of HFNC. Admitted to medicine stepdown on pressors and IV abx. Patient has had frequent admissions for similar issues (this is her 9th admission in 12 months). She resides at a group home, and is DNR/DNI.    1. Acute Hypoxemic Respiratory Failure  and Septic shock (POA) secondary to pneumonia  possible aspiration/ recurrent admission.   Hx Bacteremia with ESBL Proteus and VR E fecalis and fecium  hx of down syndrome  Hx of Cerebral Palsy/ Nonverbal at baseline   - Admit to SDU                       - CTH:No change                - LA remain elevated . Repeat LA  - Given LR bolus now on IVF LR . Give another bolus of LR   - Cont levophed . also on midodrine   * Recent ECHO with normal EF, goal directed fluid resuscitation  - Cont Zyvox and meropenem   - Blood cx:pending            - Sputum cx:pending   - Start Hydrocortisone 50mg IV q6hr   - Chest PT via vest   - AVAP trial   - CTA chest:   a) reported Diffuse, near complete left lower lobe and upper lobe mixed consolidation and atelectasis, with some remaining aeration to left upper   lobe.   b) Occlusion of right mainstem bronchus; correlation for aspiration suggested.   c) Near complete resolution of right lung airspace opacification, with  few residual likely inflammatory/infectious ground-glass airspace opacities.   d) No evidence of acute pulmonary embolism.  - Critical care consult appreciated   - Palliative care consult: patient is already DNR/DNI, [facility] checklist completed on prior admission with CAB and \[facility] [place] [facility]\""  - ID RECOMMENDATIONS  - continue Linezolid 600 mg q 12 hours   - continue meropenem 1g q 8hours   - can stop doxycycline as low suspicion for atypical infection   - check MRSA nares   - at risk for MDR, please call/message if with worsening hypotension/increasing lactate  - follow-up blood cx       2.Seizure  - Cont home meds    3. Hx of duodena perforation  - Cont PPI     4. DVT px Lovenox     Poor prognosis   DNR/I asper [facility] COPY IN 2/2024 - Palliative team consult.   * last admission there was discussion abt CMO if pt has irreversible disease     For Follow-Up:  f/u o2 saturation after weaning of down of oxygen supplementation  f/u on blood culture, ordered for 4pm No

## 2024-06-29 NOTE — PROGRESS NOTE ADULT - SUBJECTIVE AND OBJECTIVE BOX
Patient is a 58y old  Female who presents with a chief complaint of Sepsis (28 Jun 2024 16:43)        Over Night Events:        ROS:     All ROS are negative except HPI         PHYSICAL EXAM    ICU Vital Signs Last 24 Hrs  T(C): 37.1 (29 Jun 2024 04:00), Max: 38.8 (28 Jun 2024 09:56)  T(F): 98.7 (29 Jun 2024 04:00), Max: 101.8 (28 Jun 2024 09:56)  HR: 188 (29 Jun 2024 04:00) (90 - 188)  BP: 157/78 (29 Jun 2024 04:00) (101/59 - 157/88)  BP(mean): 112 (28 Jun 2024 20:00) (82 - 112)  ABP: --  ABP(mean): --  RR: 18 (28 Jun 2024 23:49) (18 - 18)  SpO2: 93% (29 Jun 2024 04:00) (93% - 98%)    O2 Parameters below as of 28 Jun 2024 20:25  Patient On (Oxygen Delivery Method): mask, Venturi    O2 Concentration (%): 40        CONSTITUTIONAL:  NAD    ENT:   Airway patent,   Mouth with normal mucosa.   No thrush    EYES:   Pupils equal,   Round and reactive to light.    CARDIAC:   Normal rate,   Regular rhythm.    No edema    RESPIRATORY:   No wheezing  Bilateral BS  Normal chest expansion  Not tachypneic,  No use of accessory muscles    GASTROINTESTINAL:  Abdomen soft,   Non-tender,   No guarding,   + BS    MUSCULOSKELETAL:   Range of motion is not limited,  No clubbing, cyanosis    NEUROLOGICAL:   Alert and oriented   No motor  deficits.    SKIN:   Skin normal color for race,   Warm and dry and intact.   No evidence of rash.        06-28-24 @ 07:01  -  06-29-24 @ 07:00  --------------------------------------------------------  IN:  Total IN: 0 mL    OUT:    Voided (mL): 2100 mL  Total OUT: 2100 mL    Total NET: -2100 mL          LABS:                            9.4    26.57 )-----------( 331      ( 28 Jun 2024 11:00 )             28.5                                               06-28    137  |  99  |  5<L>  ----------------------------<  141<H>  3.1<L>   |  28  |  0.5<L>    Ca    8.8      28 Jun 2024 11:00  Mg     1.7     06-28    TPro  6.1  /  Alb  2.9<L>  /  TBili  0.4  /  DBili  x   /  AST  15  /  ALT  15  /  AlkPhos  96  06-28                                             Urinalysis Basic - ( 28 Jun 2024 11:00 )    Color: x / Appearance: x / SG: x / pH: x  Gluc: 141 mg/dL / Ketone: x  / Bili: x / Urobili: x   Blood: x / Protein: x / Nitrite: x   Leuk Esterase: x / RBC: x / WBC x   Sq Epi: x / Non Sq Epi: x / Bacteria: x                                                  LIVER FUNCTIONS - ( 28 Jun 2024 11:00 )  Alb: 2.9 g/dL / Pro: 6.1 g/dL / ALK PHOS: 96 U/L / ALT: 15 U/L / AST: 15 U/L / GGT: x                                                  Urinalysis with Rflx Culture (collected 26 Jun 2024 22:10)    Culture - Urine (collected 26 Jun 2024 22:10)  Source: Catheterized None  Final Report (28 Jun 2024 21:21):    <10,000 CFU/mL Normal Urogenital Mili    Culture - Blood (collected 26 Jun 2024 19:14)  Source: .Blood Blood-Peripheral  Preliminary Report (28 Jun 2024 23:02):    No growth at 48 Hours    Culture - Blood (collected 26 Jun 2024 19:14)  Source: .Blood Blood-Peripheral  Preliminary Report (28 Jun 2024 23:02):    No growth at 48 Hours                                                                                       ABG - ( 27 Jun 2024 11:29 )  pH, Arterial: 7.42  pH, Blood: x     /  pCO2: 43    /  pO2: 177   / HCO3: 28    / Base Excess: 3.0   /  SaO2: 99.9                MEDICATIONS  (STANDING):  albuterol/ipratropium for Nebulization 3 milliLiter(s) Nebulizer every 6 hours  calcium carbonate   1250 mG (OsCal) 1 Tablet(s) Oral daily  doxycycline IVPB 100 milliGRAM(s) IV Intermittent every 12 hours  enoxaparin Injectable 30 milliGRAM(s) SubCutaneous every 24 hours  gabapentin 300 milliGRAM(s) Oral two times a day  hydrocortisone sodium succinate Injectable 50 milliGRAM(s) IV Push every 6 hours  levETIRAcetam 750 milliGRAM(s) Oral two times a day  linezolid  IVPB 600 milliGRAM(s) IV Intermittent every 12 hours  linezolid  IVPB      melatonin 3 milliGRAM(s) Oral at bedtime  meropenem  IVPB 1000 milliGRAM(s) IV Intermittent every 8 hours  meropenem  IVPB      midodrine 10 milliGRAM(s) Oral every 8 hours  pantoprazole   Suspension 40 milliGRAM(s) Enteral Tube daily  raloxifene 60 milliGRAM(s) Oral daily  senna 2 Tablet(s) Oral at bedtime    MEDICATIONS  (PRN):  acetaminophen     Tablet .. 650 milliGRAM(s) Oral every 6 hours PRN Temp greater or equal to 38C (100.4F), Mild Pain (1 - 3)  aluminum hydroxide/magnesium hydroxide/simethicone Suspension 30 milliLiter(s) Oral every 4 hours PRN Dyspepsia  ondansetron Injectable 4 milliGRAM(s) IV Push every 8 hours PRN Nausea and/or Vomiting      New X-rays reviewed:                                                                                  ECHO    CXR interpreted by me:       Patient is a 58y old  Female who presents with a chief complaint of Sepsis (28 Jun 2024 16:43)        Over Night Events:  Downgraded from ICU.  Off pressors        ROS:     All ROS are negative except HPI         PHYSICAL EXAM    ICU Vital Signs Last 24 Hrs  T(C): 37.1 (29 Jun 2024 04:00), Max: 38.8 (28 Jun 2024 09:56)  T(F): 98.7 (29 Jun 2024 04:00), Max: 101.8 (28 Jun 2024 09:56)  HR: 188 (29 Jun 2024 04:00) (90 - 188)  BP: 157/78 (29 Jun 2024 04:00) (101/59 - 157/88)  BP(mean): 112 (28 Jun 2024 20:00) (82 - 112)  ABP: --  ABP(mean): --  RR: 18 (28 Jun 2024 23:49) (18 - 18)  SpO2: 93% (29 Jun 2024 04:00) (93% - 98%)    O2 Parameters below as of 28 Jun 2024 20:25  Patient On (Oxygen Delivery Method): mask, Venturi    O2 Concentration (%): 40        CONSTITUTIONAL:  NAD  Down facies    ENT:   Airway patent,   Mouth with normal mucosa.   No thrush    EYES:   Pupils equal,   Round and reactive to light.    CARDIAC:   Tachycardic  Regular rhythm.    No edema    RESPIRATORY:   No wheezing  Bilateral BS  Normal chest expansion  Not tachypneic,  No use of accessory muscles    GASTROINTESTINAL:  Abdomen soft,   Non-tender    MUSCULOSKELETAL:   Range of motion is not limited,  No clubbing, cyanosis    NEUROLOGICAL:   Awake    SKIN:   Skin normal color for race        06-28-24 @ 07:01  -  06-29-24 @ 07:00  --------------------------------------------------------  IN:  Total IN: 0 mL    OUT:    Voided (mL): 2100 mL  Total OUT: 2100 mL    Total NET: -2100 mL          LABS:                            9.4    26.57 )-----------( 331      ( 28 Jun 2024 11:00 )             28.5                                               06-28    137  |  99  |  5<L>  ----------------------------<  141<H>  3.1<L>   |  28  |  0.5<L>    Ca    8.8      28 Jun 2024 11:00  Mg     1.7     06-28    TPro  6.1  /  Alb  2.9<L>  /  TBili  0.4  /  DBili  x   /  AST  15  /  ALT  15  /  AlkPhos  96  06-28                                             Urinalysis Basic - ( 28 Jun 2024 11:00 )    Color: x / Appearance: x / SG: x / pH: x  Gluc: 141 mg/dL / Ketone: x  / Bili: x / Urobili: x   Blood: x / Protein: x / Nitrite: x   Leuk Esterase: x / RBC: x / WBC x   Sq Epi: x / Non Sq Epi: x / Bacteria: x                                                  LIVER FUNCTIONS - ( 28 Jun 2024 11:00 )  Alb: 2.9 g/dL / Pro: 6.1 g/dL / ALK PHOS: 96 U/L / ALT: 15 U/L / AST: 15 U/L / GGT: x                                                  Urinalysis with Rflx Culture (collected 26 Jun 2024 22:10)    Culture - Urine (collected 26 Jun 2024 22:10)  Source: Catheterized None  Final Report (28 Jun 2024 21:21):    <10,000 CFU/mL Normal Urogenital Mili    Culture - Blood (collected 26 Jun 2024 19:14)  Source: .Blood Blood-Peripheral  Preliminary Report (28 Jun 2024 23:02):    No growth at 48 Hours    Culture - Blood (collected 26 Jun 2024 19:14)  Source: .Blood Blood-Peripheral  Preliminary Report (28 Jun 2024 23:02):    No growth at 48 Hours                                                                                       ABG - ( 27 Jun 2024 11:29 )  pH, Arterial: 7.42  pH, Blood: x     /  pCO2: 43    /  pO2: 177   / HCO3: 28    / Base Excess: 3.0   /  SaO2: 99.9                MEDICATIONS  (STANDING):  albuterol/ipratropium for Nebulization 3 milliLiter(s) Nebulizer every 6 hours  calcium carbonate   1250 mG (OsCal) 1 Tablet(s) Oral daily  doxycycline IVPB 100 milliGRAM(s) IV Intermittent every 12 hours  enoxaparin Injectable 30 milliGRAM(s) SubCutaneous every 24 hours  gabapentin 300 milliGRAM(s) Oral two times a day  hydrocortisone sodium succinate Injectable 50 milliGRAM(s) IV Push every 6 hours  levETIRAcetam 750 milliGRAM(s) Oral two times a day  linezolid  IVPB 600 milliGRAM(s) IV Intermittent every 12 hours  linezolid  IVPB      melatonin 3 milliGRAM(s) Oral at bedtime  meropenem  IVPB 1000 milliGRAM(s) IV Intermittent every 8 hours  meropenem  IVPB      midodrine 10 milliGRAM(s) Oral every 8 hours  pantoprazole   Suspension 40 milliGRAM(s) Enteral Tube daily  raloxifene 60 milliGRAM(s) Oral daily  senna 2 Tablet(s) Oral at bedtime    MEDICATIONS  (PRN):  acetaminophen     Tablet .. 650 milliGRAM(s) Oral every 6 hours PRN Temp greater or equal to 38C (100.4F), Mild Pain (1 - 3)  aluminum hydroxide/magnesium hydroxide/simethicone Suspension 30 milliLiter(s) Oral every 4 hours PRN Dyspepsia  ondansetron Injectable 4 milliGRAM(s) IV Push every 8 hours PRN Nausea and/or Vomiting      New X-rays reviewed:                                                                                  ECHO    CXR interpreted by me:

## 2024-06-29 NOTE — PROGRESS NOTE ADULT - SUBJECTIVE AND OBJECTIVE BOX
TEA ECHAVARRIA  58y Female    CHIEF COMPLAINT:    Patient is a 58y old  Female who presents with a chief complaint of Sepsis (2024 07:05)    INTERVAL HPI/OVERNIGHT EVENTS:    Patient seen and examined. No acute events overnight. on Venturi mask    ROS: All other systems are negative.    Vital Signs:    T(F): 98.7 (24 @ 04:00), Max: 101.8 (24 @ 09:56)  HR: 188 (24 @ 04:00) (90 - 188)  BP: 157/78 (24 @ 04:00) (101/59 - 157/88)  RR: 18 (24 @ 23:49) (18 - 18)  SpO2: 93% (24 @ 04:00) (93% - 98%)  I&O's Summary    2024 07:01  -  2024 07:00  --------------------------------------------------------  IN: 0 mL / OUT: 2100 mL / NET: -2100 mL      Daily     Daily Weight in k (2024 04:00)    PHYSICAL EXAM:    GENERAL:  NAD chronically ill appearing   SKIN: No rashes or lesions  HEENT: Atraumatic. Normocephalic.   NECK: Supple, No JVD.    PULMONARY: decreased breath sounds B/L. No wheezing.    CVS: Normal S1, S2. Rate and Rhythm are regular.   ABDOMEN/GI: Soft, Nontender, Nondistended   MSK:  No clubbing or cyanosis   NEUROLOGIC:  does not follow commands   PSYCH: lethargic, nonverbal     Consultant(s) Notes Reviewed:  [x ] YES  [ ] NO  Care Discussed with Consultants/Other Providers [ x] YES  [ ] NO    LABS:                        9.4    26.57 )-----------( 331      ( 2024 11:00 )             28.5     137  |  99  |  5<L>  ----------------------------<  141<H>  3.1<L>   |  28  |  0.5<L>    Ca    8.8      2024 11:00  Mg     1.7         TPro  6.1  /  Alb  2.9<L>  /  TBili  0.4  /  DBili  x   /  AST  15  /  ALT  15  /  AlkPhos  96      Urinalysis with Rflx Culture (collected 2024 22:10)    Culture - Urine (collected 2024 22:10)  Source: Catheterized None  Final Report (2024 21:21):    <10,000 CFU/mL Normal Urogenital Mili    Culture - Blood (collected 2024 19:14)  Source: .Blood Blood-Peripheral  Preliminary Report (2024 23:02):    No growth at 48 Hours    Culture - Blood (collected 2024 19:14)  Source: .Blood Blood-Peripheral  Preliminary Report (2024 23:02):    No growth at 48 Hours    RADIOLOGY & ADDITIONAL TESTS:  Imaging or report Personally Reviewed:  [x] YES  [ ] NO  EKG reviewed: [x] YES  [ ] NO    Medications:  Standing  albuterol/ipratropium for Nebulization 3 milliLiter(s) Nebulizer every 6 hours  calcium carbonate   1250 mG (OsCal) 1 Tablet(s) Oral daily  doxycycline IVPB 100 milliGRAM(s) IV Intermittent every 12 hours  enoxaparin Injectable 30 milliGRAM(s) SubCutaneous every 24 hours  gabapentin 300 milliGRAM(s) Oral two times a day  hydrocortisone sodium succinate Injectable 50 milliGRAM(s) IV Push every 6 hours  levETIRAcetam 750 milliGRAM(s) Oral two times a day  linezolid  IVPB 600 milliGRAM(s) IV Intermittent every 12 hours  linezolid  IVPB      melatonin 3 milliGRAM(s) Oral at bedtime  meropenem  IVPB 1000 milliGRAM(s) IV Intermittent every 8 hours  meropenem  IVPB      midodrine 10 milliGRAM(s) Oral every 8 hours  pantoprazole   Suspension 40 milliGRAM(s) Enteral Tube daily  raloxifene 60 milliGRAM(s) Oral daily  senna 2 Tablet(s) Oral at bedtime    PRN Meds  acetaminophen     Tablet .. 650 milliGRAM(s) Oral every 6 hours PRN  aluminum hydroxide/magnesium hydroxide/simethicone Suspension 30 milliLiter(s) Oral every 4 hours PRN  ondansetron Injectable 4 milliGRAM(s) IV Push every 8 hours PRN             TEA ECHAVARRIA  58y Female    CHIEF COMPLAINT:    Patient is a 58y old  Female who presents with a chief complaint of Sepsis (2024 07:05)    INTERVAL HPI/OVERNIGHT EVENTS:    Patient seen and examined. No acute events overnight. on Venturi mask, off pressors, tachycardic     ROS: All other systems are negative.    Vital Signs:    T(F): 98.7 (24 @ 04:00), Max: 101.8 (24 @ 09:56)  HR: 188 (24 @ 04:00) (90 - 188)  BP: 157/78 (24 @ 04:00) (101/59 - 157/88)  RR: 18 (24 @ 23:49) (18 - 18)  SpO2: 93% (24 @ 04:00) (93% - 98%)    2024 07:01  -  2024 07:00  --------------------------------------------------------  IN: 0 mL / OUT: 2100 mL / NET: -2100 mL      Daily Weight in k (2024 04:00)    PHYSICAL EXAM:    GENERAL:  NAD chronically ill appearing   SKIN: No rashes or lesions  HEENT: Atraumatic. Normocephalic.   NECK: Supple, No JVD.    PULMONARY: decreased breath sounds B/L. No wheezing.    CVS: Normal S1, S2. Rate and Rhythm are regular.   ABDOMEN/GI: Soft, Nontender, Nondistended   MSK:  No clubbing or cyanosis   NEUROLOGIC:  does not follow commands   PSYCH: lethargic, nonverbal     Consultant(s) Notes Reviewed:  [x ] YES  [ ] NO  Care Discussed with Consultants/Other Providers [ x] YES  [ ] NO    LABS:                        9.4    26.57 )-----------( 331      ( 2024 11:00 )             28.5     137  |  99  |  5<L>  ----------------------------<  141<H>  3.1<L>   |  28  |  0.5<L>    Ca    8.8      2024 11:00  Mg     1.7         TPro  6.1  /  Alb  2.9<L>  /  TBili  0.4  /  DBili  x   /  AST  15  /  ALT  15  /  AlkPhos  96      Urinalysis with Rflx Culture (collected 2024 22:10)    Culture - Urine (collected 2024 22:10)  Source: Catheterized None  Final Report (2024 21:21):    <10,000 CFU/mL Normal Urogenital Mili    Culture - Blood (collected 2024 19:14)  Source: .Blood Blood-Peripheral  Preliminary Report (2024 23:02):    No growth at 48 Hours    Culture - Blood (collected 2024 19:14)  Source: .Blood Blood-Peripheral  Preliminary Report (2024 23:02):    No growth at 48 Hours    RADIOLOGY & ADDITIONAL TESTS:  Imaging or report Personally Reviewed:  [x] YES  [ ] NO  EKG reviewed: [x] YES  [ ] NO    Medications:  Standing  albuterol/ipratropium for Nebulization 3 milliLiter(s) Nebulizer every 6 hours  calcium carbonate   1250 mG (OsCal) 1 Tablet(s) Oral daily  doxycycline IVPB 100 milliGRAM(s) IV Intermittent every 12 hours  enoxaparin Injectable 30 milliGRAM(s) SubCutaneous every 24 hours  gabapentin 300 milliGRAM(s) Oral two times a day  hydrocortisone sodium succinate Injectable 50 milliGRAM(s) IV Push every 6 hours  levETIRAcetam 750 milliGRAM(s) Oral two times a day  linezolid  IVPB 600 milliGRAM(s) IV Intermittent every 12 hours  linezolid  IVPB      melatonin 3 milliGRAM(s) Oral at bedtime  meropenem  IVPB 1000 milliGRAM(s) IV Intermittent every 8 hours  meropenem  IVPB      midodrine 10 milliGRAM(s) Oral every 8 hours  pantoprazole   Suspension 40 milliGRAM(s) Enteral Tube daily  raloxifene 60 milliGRAM(s) Oral daily  senna 2 Tablet(s) Oral at bedtime    PRN Meds  acetaminophen     Tablet .. 650 milliGRAM(s) Oral every 6 hours PRN  aluminum hydroxide/magnesium hydroxide/simethicone Suspension 30 milliLiter(s) Oral every 4 hours PRN  ondansetron Injectable 4 milliGRAM(s) IV Push every 8 hours PRN

## 2024-06-29 NOTE — PROGRESS NOTE ADULT - ASSESSMENT
58F with PMH including CP, anemia, Down's syndrome, seizures, OP, hypotension, nonverbal at abseline, here with respiratory distress, and found to have acute hypoxic respiratory failure from PNA, possibly from aspiration, admitted to medicine stepdown on pressors and IV abx.    Acute Hypoxemic Respiratory Failure due to suspected Aspiration PNA, recurrent  Septic shock (POA)  Hx Bacteremia with ESBL Proteus and VR E fecalis and fecium.  Hx of Cerebral Palsy/ Down syndrome/ Nonverbal at baseline   Elevated lactate  -  CTA chest:  a) reported Diffuse, near complete left lower lobe and upper lobe mixed consolidation and atelectasis, with some remaining aeration to left upper lobe.                        b) Occlusion of right mainstem bronchus; correlation for aspiration suggested.                        c) Near complete resolution of right lung airspace opacification, with  few residual likely inflammatory/infectious ground-glass airspace opacities.                        d) No evidence of acute pulmonary embolism.  - currently critically ill on venturi mask, on levophed  - added Midodrine and HC 50 q6H to wean off pressors  - so far, Ucx and blood cx NGTD, procal 0.6  - appreciate ID eval, c.w meropenem and Zyvox, dc doxycycline  - Chest PT via vest, nebs, daily chest xrays   - strict aspiration precautions  - palliative team following, patient is currently DNR/DNI    Seizure - c/w Keppra     Hx of duodenal perforation - Cont PPI     OVerall prognosis is poor, high risk of decompensation, continue monitoring in SDU   58F with PMH including CP, anemia, Down's syndrome, seizures, OP, hypotension, nonverbal at at baseline, here with respiratory distress, and found to have acute hypoxic respiratory failure from PNA, possibly from aspiration, admitted to medicine stepdown on pressors and IV abx.    Acute Hypoxemic Respiratory Failure due to suspected Aspiration PNA, recurrent  Septic shock (POA)  Hx Bacteremia with ESBL Proteus and VR E fecalis and fecium.  Hx of Cerebral Palsy/ Down syndrome/ Nonverbal at baseline   Elevated lactate  -  CTA chest:  a) reported Diffuse, near complete left lower lobe and upper lobe mixed consolidation and atelectasis, with some remaining aeration to left upper lobe.                        b) Occlusion of right mainstem bronchus; correlation for aspiration suggested.                        c) Near complete resolution of right lung airspace opacification, with  few residual likely inflammatory/infectious ground-glass airspace opacities.                        d) No evidence of acute pulmonary embolism.  - currently critically ill on venturi mask, off levophed  - added Midodrine and HC 50 q6H to wean off pressors. Now off levophed. GIve 500 cc bolus and DC HC  - so far, Ucx and blood cx NGTD, procal 0.6  - appreciate ID eval, c.w meropenem and Zyvox, dc doxycycline  - Chest PT via vest, nebs, daily chest xrays   - strict aspiration precautions, resume PEG feeds   - palliative team following, patient is currently DNR/DNI    Seizure - c/w Keppra     Hx of duodenal perforation - Cont PPI     OVerall prognosis is poor, high risk of decompensation  Pending: monitor BP, c/w IV abx, resume tube feeds, palliative fun     Patient seen at bedside, total time spent to evaluate and treat the patient's acute illness and chronic medical conditions as well as time spent reviewing prior records, labs, radiology, documenting in electronic medical records,  discussing medical plan with   medical team was more than 55 minutes with >50% of time spent face to face with patient, discussing with patient/family as well as coordination of care

## 2024-06-30 LAB
ALBUMIN SERPL ELPH-MCNC: 2.8 G/DL — LOW (ref 3.5–5.2)
ALP SERPL-CCNC: 84 U/L — SIGNIFICANT CHANGE UP (ref 30–115)
ALT FLD-CCNC: 9 U/L — SIGNIFICANT CHANGE UP (ref 0–41)
ANION GAP SERPL CALC-SCNC: 14 MMOL/L — SIGNIFICANT CHANGE UP (ref 7–14)
AST SERPL-CCNC: 11 U/L — SIGNIFICANT CHANGE UP (ref 0–41)
BASOPHILS # BLD AUTO: 0.05 K/UL — SIGNIFICANT CHANGE UP (ref 0–0.2)
BASOPHILS NFR BLD AUTO: 0.4 % — SIGNIFICANT CHANGE UP (ref 0–1)
BILIRUB SERPL-MCNC: 0.3 MG/DL — SIGNIFICANT CHANGE UP (ref 0.2–1.2)
BUN SERPL-MCNC: 11 MG/DL — SIGNIFICANT CHANGE UP (ref 10–20)
CALCIUM SERPL-MCNC: 8.8 MG/DL — SIGNIFICANT CHANGE UP (ref 8.4–10.5)
CHLORIDE SERPL-SCNC: 99 MMOL/L — SIGNIFICANT CHANGE UP (ref 98–110)
CO2 SERPL-SCNC: 29 MMOL/L — SIGNIFICANT CHANGE UP (ref 17–32)
CREAT SERPL-MCNC: 0.5 MG/DL — LOW (ref 0.7–1.5)
EGFR: 109 ML/MIN/1.73M2 — SIGNIFICANT CHANGE UP
EOSINOPHIL # BLD AUTO: 0.11 K/UL — SIGNIFICANT CHANGE UP (ref 0–0.7)
EOSINOPHIL NFR BLD AUTO: 0.9 % — SIGNIFICANT CHANGE UP (ref 0–8)
GLUCOSE BLDC GLUCOMTR-MCNC: 107 MG/DL — HIGH (ref 70–99)
GLUCOSE BLDC GLUCOMTR-MCNC: 154 MG/DL — HIGH (ref 70–99)
GLUCOSE SERPL-MCNC: 122 MG/DL — HIGH (ref 70–99)
HCT VFR BLD CALC: 29.6 % — LOW (ref 37–47)
HGB BLD-MCNC: 9.5 G/DL — LOW (ref 12–16)
IMM GRANULOCYTES NFR BLD AUTO: 0.5 % — HIGH (ref 0.1–0.3)
LYMPHOCYTES # BLD AUTO: 16.3 % — LOW (ref 20.5–51.1)
LYMPHOCYTES # BLD AUTO: 2 K/UL — SIGNIFICANT CHANGE UP (ref 1.2–3.4)
MAGNESIUM SERPL-MCNC: 1.9 MG/DL — SIGNIFICANT CHANGE UP (ref 1.8–2.4)
MCHC RBC-ENTMCNC: 29.8 PG — SIGNIFICANT CHANGE UP (ref 27–31)
MCHC RBC-ENTMCNC: 32.1 G/DL — SIGNIFICANT CHANGE UP (ref 32–37)
MCV RBC AUTO: 92.8 FL — SIGNIFICANT CHANGE UP (ref 81–99)
MONOCYTES # BLD AUTO: 0.63 K/UL — HIGH (ref 0.1–0.6)
MONOCYTES NFR BLD AUTO: 5.1 % — SIGNIFICANT CHANGE UP (ref 1.7–9.3)
NEUTROPHILS # BLD AUTO: 9.41 K/UL — HIGH (ref 1.4–6.5)
NEUTROPHILS NFR BLD AUTO: 76.8 % — HIGH (ref 42.2–75.2)
NRBC # BLD: 0 /100 WBCS — SIGNIFICANT CHANGE UP (ref 0–0)
PLATELET # BLD AUTO: 377 K/UL — SIGNIFICANT CHANGE UP (ref 130–400)
PMV BLD: 10.6 FL — HIGH (ref 7.4–10.4)
POTASSIUM SERPL-MCNC: 3.1 MMOL/L — LOW (ref 3.5–5)
POTASSIUM SERPL-SCNC: 3.1 MMOL/L — LOW (ref 3.5–5)
PROT SERPL-MCNC: 6 G/DL — SIGNIFICANT CHANGE UP (ref 6–8)
RBC # BLD: 3.19 M/UL — LOW (ref 4.2–5.4)
RBC # FLD: 19.5 % — HIGH (ref 11.5–14.5)
SODIUM SERPL-SCNC: 142 MMOL/L — SIGNIFICANT CHANGE UP (ref 135–146)
WBC # BLD: 12.26 K/UL — HIGH (ref 4.8–10.8)
WBC # FLD AUTO: 12.26 K/UL — HIGH (ref 4.8–10.8)

## 2024-06-30 PROCEDURE — 99232 SBSQ HOSP IP/OBS MODERATE 35: CPT

## 2024-06-30 RX ORDER — POTASSIUM CHLORIDE 600 MG/1
40 TABLET, FILM COATED, EXTENDED RELEASE ORAL
Refills: 0 | Status: COMPLETED | OUTPATIENT
Start: 2024-06-30 | End: 2024-07-01

## 2024-06-30 RX ADMIN — ENOXAPARIN SODIUM 30 MILLIGRAM(S): 100 INJECTION SUBCUTANEOUS at 13:10

## 2024-06-30 RX ADMIN — IPRATROPIUM BROMIDE AND ALBUTEROL SULFATE 3 MILLILITER(S): .5; 3 SOLUTION RESPIRATORY (INHALATION) at 07:20

## 2024-06-30 RX ADMIN — RALOXIFENE HYDROCHLORIDE 60 MILLIGRAM(S): 60 TABLET, FILM COATED ORAL at 13:14

## 2024-06-30 RX ADMIN — MIDODRINE HYDROCHLORIDE 10 MILLIGRAM(S): 10 TABLET ORAL at 13:11

## 2024-06-30 RX ADMIN — LEVETIRACETAM 750 MILLIGRAM(S): 100 INJECTION INTRAVENOUS at 18:17

## 2024-06-30 RX ADMIN — MEROPENEM 100 MILLIGRAM(S): 500 INJECTION, POWDER, FOR SOLUTION INTRAVENOUS at 14:00

## 2024-06-30 RX ADMIN — Medication 300 MILLIGRAM(S): at 07:05

## 2024-06-30 RX ADMIN — LEVETIRACETAM 750 MILLIGRAM(S): 100 INJECTION INTRAVENOUS at 07:02

## 2024-06-30 RX ADMIN — MIDODRINE HYDROCHLORIDE 10 MILLIGRAM(S): 10 TABLET ORAL at 07:02

## 2024-06-30 RX ADMIN — Medication 3 MILLIGRAM(S): at 21:38

## 2024-06-30 RX ADMIN — IPRATROPIUM BROMIDE AND ALBUTEROL SULFATE 3 MILLILITER(S): .5; 3 SOLUTION RESPIRATORY (INHALATION) at 19:52

## 2024-06-30 RX ADMIN — Medication 300 MILLIGRAM(S): at 18:17

## 2024-06-30 RX ADMIN — MEROPENEM 100 MILLIGRAM(S): 500 INJECTION, POWDER, FOR SOLUTION INTRAVENOUS at 21:38

## 2024-06-30 RX ADMIN — LINEZOLID 300 MILLIGRAM(S): 600 TABLET, FILM COATED ORAL at 07:01

## 2024-06-30 RX ADMIN — MEROPENEM 100 MILLIGRAM(S): 500 INJECTION, POWDER, FOR SOLUTION INTRAVENOUS at 07:01

## 2024-06-30 RX ADMIN — IPRATROPIUM BROMIDE AND ALBUTEROL SULFATE 3 MILLILITER(S): .5; 3 SOLUTION RESPIRATORY (INHALATION) at 15:06

## 2024-06-30 RX ADMIN — PANTOPRAZOLE SODIUM 40 MILLIGRAM(S): 40 INJECTION, POWDER, FOR SOLUTION INTRAVENOUS at 13:11

## 2024-06-30 RX ADMIN — LINEZOLID 300 MILLIGRAM(S): 600 TABLET, FILM COATED ORAL at 18:17

## 2024-06-30 RX ADMIN — Medication 1 TABLET(S): at 13:15

## 2024-06-30 RX ADMIN — POTASSIUM CHLORIDE 40 MILLIEQUIVALENT(S): 600 TABLET, FILM COATED, EXTENDED RELEASE ORAL at 18:17

## 2024-06-30 RX ADMIN — MIDODRINE HYDROCHLORIDE 10 MILLIGRAM(S): 10 TABLET ORAL at 21:38

## 2024-06-30 NOTE — PROGRESS NOTE ADULT - ASSESSMENT
58F with PMH including CP, anemia, Down's syndrome, seizures, OP, hypotension, nonverbal at at baseline, here with respiratory distress, and found to have acute hypoxic respiratory failure from PNA, possibly from aspiration, admitted to medicine stepdown on pressors and IV abx.    Acute Hypoxemic Respiratory Failure due to suspected Aspiration PNA, recurrent  Septic shock (POA)  Hx Bacteremia with ESBL Proteus and VR E fecalis and fecium.  Hx of Cerebral Palsy/ Down syndrome/ Nonverbal at baseline   Elevated lactate  -  CTA chest:  a) reported Diffuse, near complete left lower lobe and upper lobe mixed consolidation and atelectasis, with some remaining aeration to left upper lobe.                        b) Occlusion of right mainstem bronchus; correlation for aspiration suggested.                        c) Near complete resolution of right lung airspace opacification, with  few residual likely inflammatory/infectious ground-glass airspace opacities.                        d) No evidence of acute pulmonary embolism.  - currently on venturi mask-attempting to wean down   - midodrine 10mg q8hrs  - so far, Ucx and blood cx NGTD, procal 0.6  - appreciate ID eval, c.w meropenem and Zyvox, dc doxycycline  - Chest PT via vest, nebs, daily chest xrays   - strict aspiration precautions, resume PEG feeds   - palliative team following, patient is currently DNR/DNI  - GI consulted to consider G_Jtube - if any benefit     Seizure - c/w Keppra     Hx of duodenal perforation - Cont PPI     OVerall prognosis is poor, high risk of decompensation  Pending: monitor BP, c/w IV abx, resume tube feeds, GI consult, oxygen weaning

## 2024-06-30 NOTE — PROGRESS NOTE ADULT - ASSESSMENT
59 yo F with hx of down syndrome, anemia, cerebral palsy, seizure disorder, osteoporosis, hypotension on midodrine, nonverbal at baseline who was BIBEMS from group home for respiratory distress with complaints of fever and hypoxia likely 2/2 to bilateral basilar pneumonia possibly from aspiration. On arrival patient was tachycardic to 137. Was febrile to 102.4 F and was tachypneic to 30s. The patient is on AVAPS with trial of HFNC. Admitted to medicine stepdown on pressors and IV abx. Patient has had frequent admissions for similar issues (this is her 9th admission in 12 months). She resides at a group home, and is DNR/DNI.    1. Acute Hypoxemic Respiratory Failure  and Septic shock (POA) secondary to pneumonia  possible aspiration/ recurrent admission.   Hx Bacteremia with ESBL Proteus and VR E fecalis and fecium  hx of down syndrome  Hx of Cerebral Palsy/ Nonverbal at baseline   - Admit to SDU                       - CTH:No change                - LA remain elevated . Repeat LA  - Given LR bolus now on IVF LR . Give another bolus of LR   - Cont levophed . also on midodrine   * Recent ECHO with normal EF, goal directed fluid resuscitation  - Cont Zyvox and meropenem   - Blood cx:pending            - Sputum cx:pending   - Start Hydrocortisone 50mg IV q6hr   - Chest PT via vest   - AVAP trial   - CTA chest:   a) reported Diffuse, near complete left lower lobe and upper lobe mixed consolidation and atelectasis, with some remaining aeration to left upper   lobe.   b) Occlusion of right mainstem bronchus; correlation for aspiration suggested.   c) Near complete resolution of right lung airspace opacification, with  few residual likely inflammatory/infectious ground-glass airspace opacities.   d) No evidence of acute pulmonary embolism.  - Critical care consult appreciated   - ID consult :pending   - Palliative care consult: patient is already DNR/DNI, MOLST checklist completed on prior admission with [facility] and [facility]    2.Seizure  - Cont home meds    3. Hx of duodena perforation  - Cont PPI     4. DVT px Lovenox     Poor prognosis   DNR/I asper MOLST COPY IN 2/2024 - Palliative team consult.   * last admission there was discussion abt CMO if pt has irreversible disease

## 2024-06-30 NOTE — DIETITIAN INITIAL EVALUATION ADULT - OTHER INFO
57 yo F with hx of down syndrome, anemia, cerebral palsy, seizure disorder, osteoporosis, hypotension on midodrine, nonverbal at baseline who was BIBEMS from group home for respiratory distress with complaints of fever and hypoxia likely 2/2 to bilateral basilar pneumonia possibly from aspiration.   Patient has had frequent admissions for similar issues (this is her 9th admission in 12 months). She resides at a group home, and is DNR/DNI.  Palliative GOC 6/28:  - GOC have been discussed in past with [facility], [facility]; patient would need an irreversible condition to warrant transition to CMO  - patient is already DNR/DNI, MOLST checklist completed on prior admission with [facility] and [facility]

## 2024-06-30 NOTE — DIETITIAN INITIAL EVALUATION ADULT - ENTERAL
Adjust EN regimen: Bolus feeds of Jevity 1.2 325 mL Q6H (4 feeds daily) + 50 mL free water flushes pre/post feeds   Provides 1560 kcal, 72 g protein, 1449 mL total H2O (1049 mL free water from formula)

## 2024-06-30 NOTE — PROGRESS NOTE ADULT - SUBJECTIVE AND OBJECTIVE BOX
SUBJECTIVE/OVERNIGHT EVENTS  Today is hospital day 4d. This morning patient was seen and examined at bedside, resting comfortably in bed. No acute or major events overnight.    HOSPITAL COURSE  Day 1:   Day 2:   Day 3:     CODE STATUS:    FAMILY COMMUNICATION  Contact date:  Name of person contacted:  Relationship to patient:  Communication details:    MEDICATIONS  STANDING MEDICATIONS  albuterol/ipratropium for Nebulization 3 milliLiter(s) Nebulizer every 6 hours  calcium carbonate   1250 mG (OsCal) 1 Tablet(s) Oral daily  enoxaparin Injectable 30 milliGRAM(s) SubCutaneous every 24 hours  gabapentin 300 milliGRAM(s) Oral two times a day  lactated ringers Bolus 500 milliLiter(s) IV Bolus once  levETIRAcetam 750 milliGRAM(s) Oral two times a day  linezolid  IVPB 600 milliGRAM(s) IV Intermittent every 12 hours  linezolid  IVPB      melatonin 3 milliGRAM(s) Oral at bedtime  meropenem  IVPB      meropenem  IVPB 1000 milliGRAM(s) IV Intermittent every 8 hours  midodrine 10 milliGRAM(s) Oral every 8 hours  pantoprazole   Suspension 40 milliGRAM(s) Enteral Tube daily  raloxifene 60 milliGRAM(s) Oral daily    PRN MEDICATIONS  acetaminophen     Tablet .. 650 milliGRAM(s) Oral every 6 hours PRN  aluminum hydroxide/magnesium hydroxide/simethicone Suspension 30 milliLiter(s) Oral every 4 hours PRN  ondansetron Injectable 4 milliGRAM(s) IV Push every 8 hours PRN    VITALS  T(F): 98.7 (06-29-24 @ 22:11), Max: 98.7 (06-29-24 @ 22:11)  HR: 76 (06-29-24 @ 22:11) (61 - 103)  BP: 130/76 (06-29-24 @ 22:11) (118/56 - 132/76)  RR: 18 (06-29-24 @ 22:11) (18 - 20)  SpO2: 98% (06-29-24 @ 13:18) (97% - 100%)  POCT Blood Glucose.: 134 mg/dL (06-29-24 @ 13:06)    (  ) Indwelling Madsen Catheter   Date insterted:    Reason (  ) Critical illness     (  ) urinary retention    (  ) Accurate Ins/Outs Monitoring     (  ) CMO patient    (  ) Central Line  Date inserted:  Location: (  ) Right IJ   (  ) Left IJ   (  ) Right Fem   (  ) Left Fem    (  ) SPC  (  ) pigtail  (  ) PEG tube  (  ) colostomy  (  ) jejunostomy  (  ) U-Dall    LABS             9.6    16.97 )-----------( 330      ( 06-29-24 @ 05:42 )             29.7     143  |  102  |  7   -------------------------<  134   06-29-24 @ 05:42  3.2  |  27  |  <0.5    Ca      8.7     06-29-24 @ 05:42  Mg     1.9     06-29-24 @ 05:42    TPro  6.1  /  Alb  2.9  /  TBili  0.4  /  DBili  x   /  AST  15  /  ALT  15  /  AlkPhos  96  /  GGT  x     06-28-24 @ 11:00        Urinalysis Basic - ( 29 Jun 2024 05:42 )    Color: x / Appearance: x / SG: x / pH: x  Gluc: 134 mg/dL / Ketone: x  / Bili: x / Urobili: x   Blood: x / Protein: x / Nitrite: x   Leuk Esterase: x / RBC: x / WBC x   Sq Epi: x / Non Sq Epi: x / Bacteria: x          IMAGING

## 2024-06-30 NOTE — DIETITIAN INITIAL EVALUATION ADULT - PERTINENT MEDS FT
MEDICATIONS  (STANDING):  albuterol/ipratropium for Nebulization 3 milliLiter(s) Nebulizer every 6 hours  calcium carbonate   1250 mG (OsCal) 1 Tablet(s) Oral daily  enoxaparin Injectable 30 milliGRAM(s) SubCutaneous every 24 hours  gabapentin 300 milliGRAM(s) Oral two times a day  lactated ringers Bolus 500 milliLiter(s) IV Bolus once  levETIRAcetam 750 milliGRAM(s) Oral two times a day  linezolid  IVPB 600 milliGRAM(s) IV Intermittent every 12 hours  linezolid  IVPB      melatonin 3 milliGRAM(s) Oral at bedtime  meropenem  IVPB      meropenem  IVPB 1000 milliGRAM(s) IV Intermittent every 8 hours  midodrine 10 milliGRAM(s) Oral every 8 hours  pantoprazole   Suspension 40 milliGRAM(s) Enteral Tube daily  raloxifene 60 milliGRAM(s) Oral daily    MEDICATIONS  (PRN):  acetaminophen     Tablet .. 650 milliGRAM(s) Oral every 6 hours PRN Temp greater or equal to 38C (100.4F), Mild Pain (1 - 3)  aluminum hydroxide/magnesium hydroxide/simethicone Suspension 30 milliLiter(s) Oral every 4 hours PRN Dyspepsia  ondansetron Injectable 4 milliGRAM(s) IV Push every 8 hours PRN Nausea and/or Vomiting   MEDICATIONS  (STANDING):  albuterol/ipratropium for Nebulization 3 milliLiter(s) Nebulizer every 6 hours  calcium carbonate   1250 mG (OsCal) 1 Tablet(s) Oral daily  enoxaparin Injectable 30 milliGRAM(s) SubCutaneous every 24 hours  gabapentin 300 milliGRAM(s) Oral two times a day  lactated ringers Bolus 500 milliLiter(s) IV Bolus once  levETIRAcetam 750 milliGRAM(s) Oral two times a day  linezolid  IVPB 600 milliGRAM(s) IV Intermittent every 12 hours  linezolid  IVPB      melatonin 3 milliGRAM(s) Oral at bedtime  meropenem  IVPB      meropenem  IVPB 1000 milliGRAM(s) IV Intermittent every 8 hours  midodrine 10 milliGRAM(s) Oral every 8 hours  pantoprazole   Suspension 40 milliGRAM(s) Enteral Tube daily  raloxifene 60 milliGRAM(s) Oral daily    MEDICATIONS  (PRN):  acetaminophen     Tablet .. 650 milliGRAM(s) Oral every 6 hours PRN Temp greater or equal to 38C (100.4F), Mild Pain (1 - 3)  aluminum hydroxide/magnesium hydroxide/simethicone Suspension 30 milliLiter(s) Oral every 4 hours PRN Dyspepsia  ondansetron Injectable 4 milliGRAM(s) IV Push every 8 hours PRN Nausea and/or Vomiting

## 2024-06-30 NOTE — DIETITIAN NUTRITION RISK NOTIFICATION - TREATMENT: THE FOLLOWING DIET HAS BEEN RECOMMENDED
Diet, NPO with Tube Feed:   Tube Feeding Modality: Gastrostomy  Jevity 1.2 Juventino (JEVITY1.2RTH)  Total Volume for 24 Hours (mL): 1300  Bolus  Total Volume of Bolus (mL):  325  Total # of Feeds: 4  Tube Feed Frequency: Every 6 hours   Tube Feed Start Time: 00:00  Bolus Feed Rate (mL per Hour): 325   Bolus Feed Duration (in Hours): 1  Free Water Flush   Total Volume per Flush (mL): 150   Frequency: Every 6 Hours  Free Water Flush Instructions:  50 mL free water flushes pre/post feeds (06-30-24 @ 13:45) [Pending Verification By Attending]  Diet, NPO with Tube Feed:   Tube Feeding Modality: Gastrostomy  Jevity 1.2 Juventino (JEVITY1.2RTH)  Total Volume for 24 Hours (mL): 1170  Continuous  Starting Tube Feed Rate {mL per Hour}: 35  Increase Tube Feed Rate by (mL): 10     Every hour  Until Goal Tube Feed Rate (mL per Hour): 65  Tube Feed Duration (in Hours): 18  Tube Feed Start Time: 10:00  Free Water Flush  Bolus   Total Volume per Flush (mL): 150   Frequency: Every 6 Hours (06-29-24 @ 08:26) [Active]

## 2024-06-30 NOTE — DIETITIAN INITIAL EVALUATION ADULT - PERTINENT LABORATORY DATA
06-30    142  |  99  |  11  ----------------------------<  122<H>  3.1<L>   |  29  |  0.5<L>    Ca    8.8      30 Jun 2024 07:16  Mg     1.9     06-30    TPro  6.0  /  Alb  2.8<L>  /  TBili  0.3  /  DBili  x   /  AST  11  /  ALT  9   /  AlkPhos  84  06-30  POCT Blood Glucose.: 107 mg/dL (06-30-24 @ 11:21)  A1C with Estimated Average Glucose Result: 5.6 % (06-01-24 @ 04:17)  A1C with Estimated Average Glucose Result: 5.0 % (04-17-24 @ 06:47)

## 2024-06-30 NOTE — PROGRESS NOTE ADULT - SUBJECTIVE AND OBJECTIVE BOX
Patient is a 58y old  Female who presents with a chief complaint of Sepsis     (30 Jun 2024 12:53)      Patient seen and examined at bedside.    ALLERGIES:  chlorhexidine containing compounds (Rash (Mild to Mod))    MEDICATIONS:  acetaminophen     Tablet .. 650 milliGRAM(s) Oral every 6 hours PRN  albuterol/ipratropium for Nebulization 3 milliLiter(s) Nebulizer every 6 hours  aluminum hydroxide/magnesium hydroxide/simethicone Suspension 30 milliLiter(s) Oral every 4 hours PRN  calcium carbonate   1250 mG (OsCal) 1 Tablet(s) Oral daily  enoxaparin Injectable 30 milliGRAM(s) SubCutaneous every 24 hours  gabapentin 300 milliGRAM(s) Oral two times a day  lactated ringers Bolus 500 milliLiter(s) IV Bolus once  levETIRAcetam 750 milliGRAM(s) Oral two times a day  linezolid  IVPB      linezolid  IVPB 600 milliGRAM(s) IV Intermittent every 12 hours  melatonin 3 milliGRAM(s) Oral at bedtime  meropenem  IVPB      meropenem  IVPB 1000 milliGRAM(s) IV Intermittent every 8 hours  midodrine 10 milliGRAM(s) Oral every 8 hours  ondansetron Injectable 4 milliGRAM(s) IV Push every 8 hours PRN  pantoprazole   Suspension 40 milliGRAM(s) Enteral Tube daily  raloxifene 60 milliGRAM(s) Oral daily    Vital Signs Last 24 Hrs  T(F): 97 (30 Jun 2024 11:45), Max: 98.7 (29 Jun 2024 22:11)  HR: 97 (30 Jun 2024 11:45) (75 - 108)  BP: 101/61 (30 Jun 2024 11:45) (101/61 - 130/76)  RR: 18 (30 Jun 2024 11:45) (18 - 20)  SpO2: 98% (30 Jun 2024 05:25) (98% - 98%)  I&O's Summary    29 Jun 2024 07:01  -  30 Jun 2024 07:00  --------------------------------------------------------  IN: 0 mL / OUT: 450 mL / NET: -450 mL        PHYSICAL EXAM:  General: NAD, Alert  ENT: MMM  Neck: Supple, No JVD  Lungs: very limited breath sounds on left lung  Cardio: RRR, S1/S2, No murmurs  Abdomen: Soft, Nontender, Nondistended; Bowel sounds present  Extremities: No cyanosis, No edema, muscle atrophy   LABS:                        9.5    12.26 )-----------( 377      ( 30 Jun 2024 07:16 )             29.6     06-30    142  |  99  |  11  ----------------------------<  122  3.1   |  29  |  0.5    Ca    8.8      30 Jun 2024 07:16  Mg     1.9     06-30    TPro  6.0  /  Alb  2.8  /  TBili  0.3  /  DBili  x   /  AST  11  /  ALT  9   /  AlkPhos  84  06-30        Lactate, Blood: 1.5 mmol/L (06-29 @ 05:42)  Lactate, Blood: 2.7 mmol/L (06-28 @ 11:00)        06-01 Chol 103 mg/dL LDL -- HDL 31 mg/dL Trig 90 mg/dL              POCT Blood Glucose.: 107 mg/dL (30 Jun 2024 11:21)  POCT Blood Glucose.: 154 mg/dL (30 Jun 2024 07:25)      Urinalysis Basic - ( 30 Jun 2024 07:16 )    Color: x / Appearance: x / SG: x / pH: x  Gluc: 122 mg/dL / Ketone: x  / Bili: x / Urobili: x   Blood: x / Protein: x / Nitrite: x   Leuk Esterase: x / RBC: x / WBC x   Sq Epi: x / Non Sq Epi: x / Bacteria: x        Culture - Urine (collected 26 Jun 2024 22:10)  Source: Catheterized None  Final Report (28 Jun 2024 21:21):    <10,000 CFU/mL Normal Urogenital Mili    Culture - Blood (collected 26 Jun 2024 19:14)  Source: .Blood Blood-Peripheral  Preliminary Report (29 Jun 2024 23:09):    No growth at 72 Hours    Culture - Blood (collected 26 Jun 2024 19:14)  Source: .Blood Blood-Peripheral  Preliminary Report (29 Jun 2024 23:09):    No growth at 72 Hours          RADIOLOGY & ADDITIONAL TESTS:    Care Discussed with Consultants/Other Providers:

## 2024-06-30 NOTE — DIETITIAN INITIAL EVALUATION ADULT - NS FNS DIET ORDER
Diet, NPO with Tube Feed:   Tube Feeding Modality: Gastrostomy  Jevity 1.2 Juventino (JEVITY1.2RTH)  Total Volume for 24 Hours (mL): 1170  Continuous  Starting Tube Feed Rate {mL per Hour}: 35  Increase Tube Feed Rate by (mL): 10     Every hour  Until Goal Tube Feed Rate (mL per Hour): 65  Tube Feed Duration (in Hours): 18  Tube Feed Start Time: 10:00  Free Water Flush  Bolus   Total Volume per Flush (mL): 150   Frequency: Every 6 Hours (06-29-24 @ 08:26) [Active]

## 2024-06-30 NOTE — DIETITIAN INITIAL EVALUATION ADULT - ORAL INTAKE PTA/DIET HISTORY
Unable to obtain nutrition hx from patient; non-verbal.   Aide from group home at bedside. On Jevity 1.2 formula at home but does not know the exactly regimen. She is aware that the regimen changed from 3x daily to 2x daily recently.   Will follow up as able to determine home regimen.     Weight Hx: Per [facility], weight 3 mos ago 3/29/24 52 kg vs CBW 41 kg- noted 21% weight loss in 3 months; clinically significant - patient meets weight criteria for severe malnutrition at this time per chart review.

## 2024-06-30 NOTE — DIETITIAN INITIAL EVALUATION ADULT - ADD RECOMMEND
Nutrition Intervention: Enteral Nutrition, Coordination of Care  RD to monitor EN regimen/tolerance, GOC, GI function, Nutrition Labs, Electrolytes, NFPF, Weights    RD to follow at high nutrition risk; f/u in 3-5 days or PRN.

## 2024-06-30 NOTE — DIETITIAN INITIAL EVALUATION ADULT - OTHER CALCULATIONS
WEIGHT USED: 41 kg   ENERGY: 1435-1640kcal/day (35-40 kcal/kg)  PROTEIN: 62-82 g/day (1.5-2 g/kg)  FLUID: 1 mL/kcal  -- Estimated needs with consideration for Age, Weight, BMI, Malnutrition, multiple pressure injuries

## 2024-07-01 LAB
ANION GAP SERPL CALC-SCNC: 9 MMOL/L — SIGNIFICANT CHANGE UP (ref 7–14)
BUN SERPL-MCNC: 13 MG/DL — SIGNIFICANT CHANGE UP (ref 10–20)
CALCIUM SERPL-MCNC: 8.5 MG/DL — SIGNIFICANT CHANGE UP (ref 8.4–10.5)
CHLORIDE SERPL-SCNC: 102 MMOL/L — SIGNIFICANT CHANGE UP (ref 98–110)
CO2 SERPL-SCNC: 32 MMOL/L — SIGNIFICANT CHANGE UP (ref 17–32)
CREAT SERPL-MCNC: 0.5 MG/DL — LOW (ref 0.7–1.5)
CULTURE RESULTS: SIGNIFICANT CHANGE UP
CULTURE RESULTS: SIGNIFICANT CHANGE UP
EGFR: 109 ML/MIN/1.73M2 — SIGNIFICANT CHANGE UP
GLUCOSE SERPL-MCNC: 175 MG/DL — HIGH (ref 70–99)
HCT VFR BLD CALC: 28 % — LOW (ref 37–47)
HGB BLD-MCNC: 8.9 G/DL — LOW (ref 12–16)
MAGNESIUM SERPL-MCNC: 2.3 MG/DL — SIGNIFICANT CHANGE UP (ref 1.8–2.4)
MCHC RBC-ENTMCNC: 29.9 PG — SIGNIFICANT CHANGE UP (ref 27–31)
MCHC RBC-ENTMCNC: 31.8 G/DL — LOW (ref 32–37)
MCV RBC AUTO: 94 FL — SIGNIFICANT CHANGE UP (ref 81–99)
NRBC # BLD: 0 /100 WBCS — SIGNIFICANT CHANGE UP (ref 0–0)
PLATELET # BLD AUTO: 384 K/UL — SIGNIFICANT CHANGE UP (ref 130–400)
PMV BLD: 10.8 FL — HIGH (ref 7.4–10.4)
POTASSIUM SERPL-MCNC: 4.4 MMOL/L — SIGNIFICANT CHANGE UP (ref 3.5–5)
POTASSIUM SERPL-SCNC: 4.4 MMOL/L — SIGNIFICANT CHANGE UP (ref 3.5–5)
RBC # BLD: 2.98 M/UL — LOW (ref 4.2–5.4)
RBC # FLD: 19.4 % — HIGH (ref 11.5–14.5)
SODIUM SERPL-SCNC: 143 MMOL/L — SIGNIFICANT CHANGE UP (ref 135–146)
SPECIMEN SOURCE: SIGNIFICANT CHANGE UP
SPECIMEN SOURCE: SIGNIFICANT CHANGE UP
WBC # BLD: 10.21 K/UL — SIGNIFICANT CHANGE UP (ref 4.8–10.8)
WBC # FLD AUTO: 10.21 K/UL — SIGNIFICANT CHANGE UP (ref 4.8–10.8)

## 2024-07-01 PROCEDURE — 99223 1ST HOSP IP/OBS HIGH 75: CPT

## 2024-07-01 PROCEDURE — 99233 SBSQ HOSP IP/OBS HIGH 50: CPT

## 2024-07-01 PROCEDURE — 99232 SBSQ HOSP IP/OBS MODERATE 35: CPT

## 2024-07-01 RX ORDER — SODIUM CHLORIDE 0.9 % (FLUSH) 0.9 %
4 SYRINGE (ML) INJECTION
Refills: 0 | Status: DISCONTINUED | OUTPATIENT
Start: 2024-07-01 | End: 2024-07-10

## 2024-07-01 RX ORDER — LEVETIRACETAM 100 MG/ML
750 INJECTION INTRAVENOUS
Refills: 0 | Status: DISCONTINUED | OUTPATIENT
Start: 2024-07-01 | End: 2024-07-10

## 2024-07-01 RX ADMIN — MEROPENEM 100 MILLIGRAM(S): 500 INJECTION, POWDER, FOR SOLUTION INTRAVENOUS at 21:35

## 2024-07-01 RX ADMIN — PANTOPRAZOLE SODIUM 40 MILLIGRAM(S): 40 INJECTION, POWDER, FOR SOLUTION INTRAVENOUS at 12:44

## 2024-07-01 RX ADMIN — IPRATROPIUM BROMIDE AND ALBUTEROL SULFATE 3 MILLILITER(S): .5; 3 SOLUTION RESPIRATORY (INHALATION) at 07:56

## 2024-07-01 RX ADMIN — MEROPENEM 100 MILLIGRAM(S): 500 INJECTION, POWDER, FOR SOLUTION INTRAVENOUS at 05:41

## 2024-07-01 RX ADMIN — RALOXIFENE HYDROCHLORIDE 60 MILLIGRAM(S): 60 TABLET, FILM COATED ORAL at 14:39

## 2024-07-01 RX ADMIN — Medication 4 MILLILITER(S): at 16:33

## 2024-07-01 RX ADMIN — Medication 3 MILLIGRAM(S): at 21:35

## 2024-07-01 RX ADMIN — Medication 300 MILLIGRAM(S): at 05:40

## 2024-07-01 RX ADMIN — LEVETIRACETAM 750 MILLIGRAM(S): 100 INJECTION INTRAVENOUS at 05:40

## 2024-07-01 RX ADMIN — MIDODRINE HYDROCHLORIDE 10 MILLIGRAM(S): 10 TABLET ORAL at 06:24

## 2024-07-01 RX ADMIN — ENOXAPARIN SODIUM 30 MILLIGRAM(S): 100 INJECTION SUBCUTANEOUS at 12:44

## 2024-07-01 RX ADMIN — LINEZOLID 300 MILLIGRAM(S): 600 TABLET, FILM COATED ORAL at 18:09

## 2024-07-01 RX ADMIN — Medication 300 MILLIGRAM(S): at 18:08

## 2024-07-01 RX ADMIN — MEROPENEM 100 MILLIGRAM(S): 500 INJECTION, POWDER, FOR SOLUTION INTRAVENOUS at 14:43

## 2024-07-01 RX ADMIN — IPRATROPIUM BROMIDE AND ALBUTEROL SULFATE 3 MILLILITER(S): .5; 3 SOLUTION RESPIRATORY (INHALATION) at 19:59

## 2024-07-01 RX ADMIN — Medication 1 TABLET(S): at 12:45

## 2024-07-01 RX ADMIN — POTASSIUM CHLORIDE 40 MILLIEQUIVALENT(S): 600 TABLET, FILM COATED, EXTENDED RELEASE ORAL at 05:40

## 2024-07-01 RX ADMIN — LEVETIRACETAM 750 MILLIGRAM(S): 100 INJECTION INTRAVENOUS at 18:46

## 2024-07-01 RX ADMIN — LINEZOLID 300 MILLIGRAM(S): 600 TABLET, FILM COATED ORAL at 05:40

## 2024-07-01 RX ADMIN — IPRATROPIUM BROMIDE AND ALBUTEROL SULFATE 3 MILLILITER(S): .5; 3 SOLUTION RESPIRATORY (INHALATION) at 13:51

## 2024-07-01 RX ADMIN — MIDODRINE HYDROCHLORIDE 10 MILLIGRAM(S): 10 TABLET ORAL at 14:43

## 2024-07-01 NOTE — PROGRESS NOTE ADULT - ASSESSMENT
58F with PMH including CP, anemia, Down's syndrome, seizures, OP, hypotension, nonverbal at at baseline, here with respiratory distress, and found to have acute hypoxic respiratory failure from PNA, possibly from aspiration, admitted to medicine stepdown on pressors and IV abx.    Acute Hypoxemic Respiratory Failure due to suspected Aspiration PNA, recurrent  Septic shock (POA)  Hx Bacteremia with ESBL Proteus and VR E fecalis and fecium.  Hx of Cerebral Palsy/ Down syndrome/ Nonverbal at baseline   Elevated lactate  -  CTA chest:  a) reported Diffuse, near complete left lower lobe and upper lobe mixed consolidation and atelectasis, with some remaining aeration to left upper lobe.                        b) Occlusion of right mainstem bronchus; correlation for aspiration suggested.                        c) Near complete resolution of right lung airspace opacification, with  few residual likely inflammatory/infectious ground-glass airspace opacities.                        d) No evidence of acute pulmonary embolism.  - currently on venturi mask-attempting to wean down   - midodrine 10mg q8hrs  - so far, Ucx and blood cx NGTD, procal 0.6  - appreciate ID eval, c.w meropenem and Zyvox, dc doxycycline  - Chest PT via vest, nebs, daily chest xrays   - strict aspiration precautions, resume PEG feeds   - palliative team following, patient is currently DNR/DNI  - GI consulted to consider G_Jtube - if any benefit     Seizure - c/w Keppra     Hx of duodenal perforation - Cont PPI     OVerall prognosis is poor, high risk of decompensation  Pending: monitor BP, c/w IV abx, resume tube feeds, GI consult, oxygen weaning     58F with PMH including CP, anemia, Down's syndrome, seizures, OP, hypotension, nonverbal at at baseline, here with respiratory distress, and found to have acute hypoxic respiratory failure from PNA, possibly from aspiration, admitted to medicine stepdown on pressors and IV abx.    Acute Hypoxemic Respiratory Failure due to suspected Aspiration PNA, recurrent  Septic shock (POA)  Hx Bacteremia with ESBL Proteus and VR E fecalis and fecium.  Hx of Cerebral Palsy/ Down syndrome/ Nonverbal at baseline   Elevated lactate  -  CTA chest:  a) reported Diffuse, near complete left lower lobe and upper lobe mixed consolidation and atelectasis, with some remaining aeration to left upper lobe.                        b) Occlusion of right mainstem bronchus; correlation for aspiration suggested.                        c) Near complete resolution of right lung airspace opacification, with  few residual likely inflammatory/infectious ground-glass airspace opacities.                        d) No evidence of acute pulmonary embolism.  - currently on venturi mask-attempting to wean down   - Midodrine 10mg q8hrs  - so far, Ucx and blood cx NGTD, procal 0.6  - appreciate ID eval, c.w meropenem and Zyvox (switched to PO), dc doxycycline  - Chest PT via vest, nebs, daily chest xrays   - strict aspiration precautions, resume PEG feeds   - palliative team following, patient is currently DNR/DNI  - GI consulted to consider G_Jtube - if any benefit    Seizure - c/w Keppra     Hx of duodenal perforation - Continue PPI     Overall prognosis is poor, high risk of decompensation  Pending: monitor BP, c/w IV abx, resume tube feeds, GI consult, oxygen weaning     58F with PMH including CP, anemia, Down's syndrome, seizures, OP, hypotension, nonverbal at at baseline, here with respiratory distress, and found to have acute hypoxic respiratory failure from PNA, possibly from aspiration, admitted to medicine stepdown on pressors and IV abx.    Acute Hypoxemic Respiratory Failure due to suspected Aspiration PNA, recurrent  Septic shock (POA)  Hx Bacteremia with ESBL Proteus and VR E fecalis and fecium.  Hx of Cerebral Palsy/ Down syndrome/ Nonverbal at baseline   - Last Lactate 1.5 (29/6) down from 2.7, 3.9, 5.4.   -  CTA chest (6/27/24):  a) reported Diffuse, near complete left lower lobe and upper lobe mixed consolidation and atelectasis, with some remaining aeration to left upper lobe.                        b) Occlusion of right mainstem bronchus; correlation for aspiration suggested.                        c) Near complete resolution of right lung airspace opacification, with  few residual likely inflammatory/infectious ground-glass airspace opacities.                        d) No evidence of acute pulmonary embolism.  - currently on venturi mask-attempting to wean down   - Midodrine 10mg q8hrs  - so far, Ucx and blood cx NGTD, procal 0.6  - appreciate ID eval, cw meropenem and Zyvox (switched to PO, per ID recommendations 7/1/24), d/c doxycycline  - Chest PT via vest, nebs, daily chest xrays   - strict aspiration precautions, resume PEG feeds   - palliative team following: patient is already DNR/DNI, MOLST checklist completed on prior admission with CAB and [facility]  - GI consulted to consider G_Jtube, they do not recommend it at this time as no benefit with aspiration.     Seizure - c/w Keppra     Hx of duodenal perforation - Continue PPI     Overall prognosis is poor, high risk of decompensation  Pending: monitor BP, c/w IV abx, resume tube feeds, GI consult, oxygen weaning     58F with PMH including CP, anemia, Down's syndrome, seizures, OP, hypotension, nonverbal at at baseline, here with respiratory distress, and found to have acute hypoxic respiratory failure from PNA, possibly from aspiration, admitted to medicine stepdown on pressors and IV abx.    #Acute Hypoxemic Respiratory Failure due to suspected Aspiration PNA, recurrent  #Septic shock (POA)  #Hx Bacteremia with ESBL Proteus and VR E fecalis and fecium.  #Hx of Cerebral Palsy/ Down syndrome/ Nonverbal at baseline   - Last Lactate 1.5 (29/6) down from 2.7, 3.9, 5.4.   -  CTA chest (6/27/24):  a) reported Diffuse, near complete left lower lobe and upper lobe mixed consolidation and atelectasis, with some remaining aeration to left upper lobe.                        b) Occlusion of right mainstem bronchus; correlation for aspiration suggested.                        c) Near complete resolution of right lung airspace opacification, with  few residual likely inflammatory/infectious ground-glass airspace opacities.                        d) No evidence of acute pulmonary embolism.  - Weaned down from venturi mask (7/1/24), currently on high flow nasal cannula  - Midodrine 10mg q8hrs  - so far, Ucx and blood cx NGTD, procal 0.6  - appreciate ID eval, cw meropenem and Zyvox (switched to PO, per ID recommendations 7/1/24), d/c doxycycline  - Chest PT via vest, nebs, daily chest xrays   - strict aspiration precautions, resume PEG feeds   - palliative team following: patient is already DNR/DNI, MOLST checklist completed on prior admission with [facility] and [facility]  - GI consulted to consider G_Jtube, they do not recommend it at this time as no benefit with aspiration.     #Seizure   - c/w Keppra     #Hx of duodenal perforation  - Continue PPI     Overall prognosis is poor, high risk of decompensation  Pending: monitor BP, c/w IV abx, resume tube feeds, GI consult, oxygen weaning

## 2024-07-01 NOTE — PROGRESS NOTE ADULT - SUBJECTIVE AND OBJECTIVE BOX
HPI: 58F with PMH including CP, anemia, Down's syndrome, seizures, OP, hypotension, nonverbal at abseline, here with respiratory distress, and found to have acute hypoxic respiratory failure from PNA, possibly from aspiration, admitted to medicine stepdown on pressors and IV abx. Patient has had frequent admissions for similar issues (this is her 9th admission in 12 months). She resides at a group home, and is DNR/DNI. Palliative consulted for [facility].    INTERVAL EVENTS  6/28: patient appears comfortable, transitioned off NIV to venti mask 40%  7/1: patient appears comfortable on NC    ADVANCE DIRECTIVES:    [ ] Full Code [X ] DNR  MOLST with checklist  [ X]  Living Will  [ ]   DECISION MAKER(s):  [ ] Health Care Proxy(s)  [ ] Surrogate(s)  [ ] Guardian           Name(s): Phone Number(s):    BASELINE (I)ADL(s) (prior to admission):  Nance: [ ]Total  [ ] Moderate [ ]Dependent  Palliative Performance Status Version 2:         %    http://npcrc.org/files/news/palliative_performance_scale_ppsv2.pdf    Allergies    chlorhexidine containing compounds (Rash (Mild to Mod))    Intolerances      MEDICATIONS  (STANDING):  albuterol/ipratropium for Nebulization 3 milliLiter(s) Nebulizer every 6 hours  calcium carbonate   1250 mG (OsCal) 1 Tablet(s) Oral daily  enoxaparin Injectable 30 milliGRAM(s) SubCutaneous every 24 hours  gabapentin 300 milliGRAM(s) Oral two times a day  levETIRAcetam 750 milliGRAM(s) Oral two times a day  linezolid  IVPB      linezolid  IVPB 600 milliGRAM(s) IV Intermittent every 12 hours  melatonin 3 milliGRAM(s) Oral at bedtime  meropenem  IVPB      meropenem  IVPB 1000 milliGRAM(s) IV Intermittent every 8 hours  midodrine 10 milliGRAM(s) Oral every 8 hours  pantoprazole   Suspension 40 milliGRAM(s) Enteral Tube daily  raloxifene 60 milliGRAM(s) Oral daily    MEDICATIONS  (PRN):  acetaminophen     Tablet .. 650 milliGRAM(s) Oral every 6 hours PRN Temp greater or equal to 38C (100.4F), Mild Pain (1 - 3)  aluminum hydroxide/magnesium hydroxide/simethicone Suspension 30 milliLiter(s) Oral every 4 hours PRN Dyspepsia  ondansetron Injectable 4 milliGRAM(s) IV Push every 8 hours PRN Nausea and/or Vomiting        PRESENT SYMPTOMS: [X ]Unable to obtain due to poor mentation   Source if other than patient:  [ ]Family   [ ]Team   [ ]All review of systems negative including pain and dyspnea unless noted below    All components of pain assessment below addressed. Blank spaces indicate that the patient did/could not complete the assessment.  Pain: [ ]yes [ ]no  QOL impact -   Location -                    Aggravating factors -  Quality -  Radiation -  Timing-  Severity (0-10 scale):  Minimal acceptable level (0-10 scale):     CPOT:    https://www.[facility].org/getattachment/[email or web address]/Critical-Care-Pain-Observation-Tool-(CPOT)    PAIN AD Score: 0  http://geriatrictoolkit.[facility]/cog/painad.pdf (press ctrl +  left click to view)    Dyspnea:                           [ ]None[ ]Mild [ ]Moderate [ ]Severe     Respiratory Distress Observation Scale (RDOS): 1  A score of 0 to 2 signifies little or no respiratory distress, 3 signifies mild distress, scores 4 to 6 indicate moderate distress, and scores greater than 7 signify severe distress  https://[email or web address].ca/sites/default/files/PDFS/761883-hhpfybxbhcl-qqxfevfc-maxxnmudato-qpght.pdf    Anxiety:                             [ ]None[ ]Mild [ ]Moderate [ ]Severe   Fatigue:                             [ ]None[ ]Mild [ ]Moderate [ ]Severe   Nausea:                             [ ]None[ ]Mild [ ]Moderate [ ]Severe   Loss of appetite:              [ ]None[ ]Mild [ ]Moderate [ ]Severe   Constipation:                    [ ]None[ ]Mild [ ]Moderate [ ]Severe    Other Symptoms:      Palliative Performance Status Version 2:         %    http://npcrc.org/files/news/palliative_performance_scale_ppsv2.pdf  PHYSICAL EXAM:  Vital Signs Last 24 Hrs  T(C): 36.6 (01 Jul 2024 12:09), Max: 36.8 (30 Jun 2024 20:55)  T(F): 97.8 (01 Jul 2024 12:09), Max: 98.2 (30 Jun 2024 20:55)  HR: 101 (01 Jul 2024 12:09) (89 - 110)  BP: 102/76 (01 Jul 2024 12:09) (101/66 - 109/60)  BP(mean): --  RR: 18 (01 Jul 2024 05:53) (18 - 18)  SpO2: 97% (01 Jul 2024 12:09) (97% - 99%)    Parameters below as of 01 Jul 2024 12:09  Patient On (Oxygen Delivery Method): nasal cannula, high flow          GENERAL:  [X ] No acute distress [ ]Lethargic  [ ]Unarousable  [ ]Verbal  [ ]Non-Verbal [ ]Cachexia    BEHAVIORAL/PSYCH:  [ ]Alert and Oriented x  [ ] Anxiety [ ] Delirium [ ] Agitation [X ] Calm   EYES: [X No scleral icterus [ ] Scleral icterus [ ] Closed  ENMT:  [ ]Dry mouth  [ ]No external oral lesions [ X] No external ear or nose lesions  CARDIOVASCULAR:  [ ]Regular [ ]Irregular [ ]Tachy [ ]Not Tachy  [ ]Raheem [ ] Edema [ ] No edema  PULMONARY:  [ ]Tachypnea  [ ]Audible excessive secretions [X ] No labored breathing [ ] labored breathing  GASTROINTESTINAL: [ ]Soft  [ ]Distended  [ X]Not distended [ ]Non tender [ ]Tender  MUSCULOSKELETAL: [ ]No clubbing [ ] clubbing  [ X] No cyanosis [ ] cyanosis  NEUROLOGIC: [ ]No focal deficits  [ ]Follows commands  [ ]Does not follow commands  [ X]Cognitive impairment  [ ]Dysphagia  [ ]Dysarthria  [ ]Paresis   SKIN: [ ] Jaundiced [X ] Non-jaundiced [ ]Rash [ ]No Rash [ ] Warm [ ] Dry  MISC/LINES: [ ] ET tube [ ] Trach [ ]NGT/OGT [ ]PEG [ ]Madsen    CRITICAL CARE:  [ ] Shock Present  [ ]Septic [ ]Cardiogenic [ ]Neurologic [ ]Hypovolemic  [ ]  Vasopressors [ ]  Inotropes   [ ]Respiratory failure present [ ]Mechanical ventilation [ ]Non-invasive ventilatory support [ ]High flow  [ ]Acute  [ ]Chronic [ ]Hypoxic  [ ]Hypercarbic [ ]Other  [ ]Other organ failure     LABS: reviewed by me, notable for: largely WNL                          8.9    10.21 )-----------( 384      ( 01 Jul 2024 06:29 )             28.0     07-01    143  |  102  |  13  ----------------------------<  175<H>  4.4   |  32  |  0.5<L>    Ca    8.5      01 Jul 2024 06:29  Mg     2.3     07-01            RADIOLOGY & ADDITIONAL STUDIES: CXR personally reviewed by me: possible opacities    PROTEIN CALORIE MALNUTRITION PRESENT: [ ]mild [ ]moderate [ ]severe [ ]underweight [ ]morbid obesity  https://www.andeal.org/vault/2440/web/files/ONC/Table_Clinical%20Characteristics%20to%20Document%20Malnutrition-White%20JV%20et%20al%202012.pdf    Height (cm): 134 (06-26-24 @ 19:00), 134 (05-31-24 @ 18:19), 134 (05-14-24 @ 02:54)  Weight (kg): 41 (06-26-24 @ 19:01), 40 (05-31-24 @ 19:53), 42.7 (05-14-24 @ 02:54)  BMI (kg/m2): 22.8 (06-26-24 @ 19:01), 22.3 (06-26-24 @ 19:00), 22.3 (05-31-24 @ 19:53)    [ ]PPSV2 < or = to 30% [ ]significant weight loss  [ ]poor nutritional intake  [ ]anasarca      [ ]Artificial Nutrition          Palliative Care Spiritual/Emotional Screening Tool Question  Severity (0-4):                    OR                    [X ] Unable to determine/NA  Score of 2 or greater indicates recommendation of Chaplaincy referral  Chaplaincy Referral: [ ] Yes [ ] Refused [ ] Following     Caregiver [place]:  [ ] Yes [ ] No    OR    [x ] Unable to determine. Will assess at later time if appropriate.  Social Work Referral [ ]  Patient and Family Centered Care Referral [ ]    Anticipatory Grief Present: [ ] Yes [ ] No    OR     [ x] Unable to determine. Will assess at later time if appropriate.  Social Work Referral [ ]  Patient and Family Centered Care Referral [ ]    REFERRALS:   [ ]Chaplaincy  [ ]Hospice  [ ]Child Life  [ ]Social Work  [ ]Case management [ ]Holistic Therapy     Palliative care education provided to patient and/or family    Goals of Care Document:     ______________  Wu Jenkins MD  Palliative Medicine  [facility]   of Geriatric and Palliative Medicine  [phone]

## 2024-07-01 NOTE — PROGRESS NOTE ADULT - ASSESSMENT
58F with PMH including CP, anemia, Down's syndrome, seizures, OP, hypotension, nonverbal at abseline, here with respiratory distress, and found to have acute hypoxic respiratory failure from PNA, possibly from aspiration, admitted to medicine stepdown on pressors and IV abx. Patient has had frequent admissions for similar issues (this is her 9th admission in 12 months). She resides at a group home, and is DNR/DNI. Palliative consulted for GOC.    - concern for ongoing sepsis given tachycardia, c/w IV abx  - monitor respiratory status, appears comfortable with downtitration to NC  - GOC have been discussed in past with [facility], [facility]; patient would need an irreversible condition to warrant transition to CMO, and currently patient is improving  - defer to GI/medicine discussion re: PEJ, palliative will be available if needed  - patient is already DNR/DNI, MOLST checklist completed on prior admission with [facility] and [facility]  - reconsult PRN    ______________  Wu Jenkins MD  Palliative Medicine  [facility]   of Geriatric and Palliative Medicine  [phone]

## 2024-07-01 NOTE — CONSULT NOTE ADULT - ASSESSMENT
INCOMPLETE NOTE       59 yo F with hx of down syndrome, anemia, cerebral palsy, seizure disorder, osteoporosis, hypotension on midodrine, nonverbal at baseline who was BIBEMS from group home for respiratory distress with complaints of fever and hypoxia likely 2/2 to bilateral basilar pneumonia possibly from aspiration. On arrival patient was tachycardic to 137. Was febrile to 102.4 F and was tachypneic to 30s. The patient is on AVAPS with trial of HFNC. Admitted to medicine stepdown on pressors and IV abx. Patient has had frequent admissions for similar issues (this is her 9th admission in 12 months). She resides at a group home, and is DNR/DNI. Patient was in the CEU and downgraded to floors.  GI consulted for frequent recurrent aspiration PNA wuth PEG tube, for recs if pt would benefit from G-J tube placement?    #Recurrent aspiration pneumonia  #Hx of oropharyngeal dysphagia   - Speech and swallow recs: NPO with non-oral means of nutrition  - EGD/PEG performed on 4/2/24 (Normal mucosa in the whole esophagus. Normal mucosa in the whole stomach. Gastrostomy placed. Normal mucosa in the whole examined duodenum)  - NM Gastric Emptying Liquid (05.14.24 @ 13:35): Impression: Normal emptying of radiolabeled meal from the stomach over time with 42% retention at 1 hour (normal is < 51%).  - Patient has been admitted 4/16, 5/31, 6/26 for pneumonia likely due to aspiration   - CT Angio Chest PE Protocol w/ IV Cont (06.27.24 @ 03:11) IMPRESSION: 1. Diffuse, near complete left lower lobe and upper lobe mixed consolidation and atelectasis, with some remaining aeration to left upper lobe. Occlusion of right mainstem bronchus; correlation for aspiration suggested. 2. Near complete resolution of right lung airspace opacification, with few residual likely inflammatory/infectious ground-glass airspace opacities. 3. No evidence of acute pulmonary embolism.  - Called home where patient resides ([phone]), they note no reported vomiting. Patient receives Jevity 1.2 marisol at 65ml/h for 16 hours a day at home          #Hx of duodenal perforation   - In 9/2023 patient had dark emesis with hypotension on pressors  - CT Abdomen and Pelvis w/ IV Cont (09.29.23 @ 12:44): IMPRESSION: Since August 12, 2023; 1. New foci of extraluminal air along posterior wall of third portion duodenum, most consistent with perforated ulcer. No evidence for intraluminal contrast or active gastrointestinal bleeding. 2.Diffuse circumferential wall thickening of the colon and rectum, nonspecific. Correlate for proctocolitis. 3. Bilateral lower lobe and right middle lobe multifocal pneumonia.  - Contained duodenal perforation, no acute surgical intervention done, NG placed to suction   - Currently on Pantoprazole 40mg suspension daily          59 yo F with hx of down syndrome, anemia, cerebral palsy, seizure disorder, osteoporosis, hypotension on midodrine, nonverbal at baseline who was BIBEMS from group home for respiratory distress with complaints of fever and hypoxia likely 2/2 to bilateral basilar pneumonia possibly from aspiration. On arrival patient was tachycardic to 137. Was febrile to 102.4 F and was tachypneic to 30s. The patient is on AVAPS with trial of HFNC. Admitted to medicine stepdown on pressors and IV abx. Patient has had frequent admissions for similar issues (this is her 9th admission in 12 months). She resides at a group home, and is DNR/DNI. Patient was in the CEU and downgraded to floors.  GI consulted for frequent recurrent aspiration PNA wuth PEG tube, for recs if pt would benefit from G-J tube placement?    #Recurrent aspiration pneumonia  #Hx of oropharyngeal dysphagia   - Speech and swallow recs: NPO with non-oral means of nutrition  - EGD/PEG performed on 4/2/24 (Normal mucosa in the whole esophagus. Normal mucosa in the whole stomach. Gastrostomy placed. Normal mucosa in the whole examined duodenum)  - NM Gastric Emptying Liquid (05.14.24 @ 13:35): Impression: Normal emptying of radiolabeled meal from the stomach over time with 42% retention at 1 hour (normal is < 51%).  - Patient has been admitted 4/16, 5/31, 6/26 for pneumonia likely due to aspiration and had several previous admissions for the same reasons (admitted 9 times in the past year)  - Called home where patient resides ([phone]), no reported vomiting, patient receives Jevity 1.2 marisol at 65ml/h for 16 hours a day at home  - Aspiration episodes are likely due to the patient's own secretions   - Can offer PEJ if within GOC, however PEJ might not change outcomes   - PEJ would be used for feeds, and PEG for venting as needed  - Consent to be gotten from CAB board ([phone])  - GI to follow     #Hx of duodenal perforation   - In 9/2023 patient had dark emesis with hypotension on pressors  - CT Abdomen and Pelvis w/ IV Cont (09.29.23 @ 12:44): IMPRESSION: Since August 12, 2023; 1. New foci of extraluminal air along posterior wall of third portion duodenum, most consistent with perforated ulcer. No evidence for intraluminal contrast or active gastrointestinal bleeding. 2.Diffuse circumferential wall thickening of the colon and rectum, nonspecific. Correlate for proctocolitis. 3. Bilateral lower lobe and right middle lobe multifocal pneumonia.  - Contained duodenal perforation, no acute surgical intervention done   - Currently on Pantoprazole 40mg suspension daily

## 2024-07-01 NOTE — PROGRESS NOTE ADULT - SUBJECTIVE AND OBJECTIVE BOX
JERICHO TEA  58y  Female    Reason for Admission:   OVERNIGHT/INTERIM: Today is hospital day 5. Pt seen and examined at bedside during AM rounds. No acute or major events overnight.         Vital Signs Last 24 Hrs  T(C): 36.2 (01 Jul 2024 05:53), Max: 36.8 (30 Jun 2024 20:55)  T(F): 97.1 (01 Jul 2024 05:53), Max: 98.2 (30 Jun 2024 20:55)  HR: 89 (01 Jul 2024 05:53) (89 - 110)  BP: 109/60 (01 Jul 2024 05:53) (101/61 - 109/60)  BP(mean): --  RR: 18 (01 Jul 2024 05:53) (18 - 18)  SpO2: 97% (01 Jul 2024 05:53) (97% - 97%)    Parameters below as of 01 Jul 2024 05:53  Patient On (Oxygen Delivery Method): mask, Venturi  O2 Flow (L/min): 50      PHYSICAL EXAM:  GENERAL: NAD, well-groomed, well-developed  HEENT - NC/AT, pupils equal and reactive to light,   NECK: Supple  CHEST/LUNG: Clear to auscultation bilaterally; No rales, rhonchi, wheezing  HEART: Regular rate and rhythm; No murmurs, rubs, or gallops  ABDOMEN: Soft, Nontender, Nondistended.  EXTREMITIES:   No clubbing, cyanosis, or edema  SKIN: No rashes or lesions  NEUROLOGIC:  does not follow commands   PSYCH: lethargic, nonverbal.        LABS:        MedsMEDICATIONS  (STANDING):  albuterol/ipratropium for Nebulization 3 milliLiter(s) Nebulizer every 6 hours  calcium carbonate   1250 mG (OsCal) 1 Tablet(s) Oral daily  enoxaparin Injectable 30 milliGRAM(s) SubCutaneous every 24 hours  gabapentin 300 milliGRAM(s) Oral two times a day  levETIRAcetam 750 milliGRAM(s) Oral two times a day  linezolid  IVPB      linezolid  IVPB 600 milliGRAM(s) IV Intermittent every 12 hours  melatonin 3 milliGRAM(s) Oral at bedtime  meropenem  IVPB      meropenem  IVPB 1000 milliGRAM(s) IV Intermittent every 8 hours  midodrine 10 milliGRAM(s) Oral every 8 hours  pantoprazole   Suspension 40 milliGRAM(s) Enteral Tube daily  raloxifene 60 milliGRAM(s) Oral daily    MEDICATIONS  (PRN):  acetaminophen     Tablet .. 650 milliGRAM(s) Oral every 6 hours PRN Temp greater or equal to 38C (100.4F), Mild Pain (1 - 3)  aluminum hydroxide/magnesium hydroxide/simethicone Suspension 30 milliLiter(s) Oral every 4 hours PRN Dyspepsia  ondansetron Injectable 4 milliGRAM(s) IV Push every 8 hours PRN Nausea and/or Vomiting           JERICHOTEA  58y  Female    Reason for Admission:   OVERNIGHT/INTERIM: Today is hospital day 5. Pt seen and examined at bedside during AM rounds. No acute or major events overnight.         Vital Signs Last 24 Hrs  T(C): 36.2 (01 Jul 2024 05:53), Max: 36.8 (30 Jun 2024 20:55)  T(F): 97.1 (01 Jul 2024 05:53), Max: 98.2 (30 Jun 2024 20:55)  HR: 89 (01 Jul 2024 05:53) (89 - 110)  BP: 109/60 (01 Jul 2024 05:53) (101/61 - 109/60)  BP(mean): --  RR: 18 (01 Jul 2024 05:53) (18 - 18)  SpO2: 97% (01 Jul 2024 05:53) (97% - 97%)    Parameters below as of 01 Jul 2024 05:53  Patient On (Oxygen Delivery Method): mask, Venturi  O2 Flow (L/min): 50      PHYSICAL EXAM:  GENERAL: Ill-looking  HEENT - Pupils equal and reactive to light,   NECK: Supple  HEART: Regular rate and rhythm  ABDOMEN: Soft, Nontender, Nondistended.   NEUROLOGIC:  does not follow commands   PSYCH: lethargic, nonverbal.        LABS:      LABS:                          8.9    10.21 )-----------( 384      ( 01 Jul 2024 06:29 )             28.0     07-01    143  |  102  |  13  ----------------------------<  175<H>  4.4   |  32  |  0.5<L>    Ca    8.5      01 Jul 2024 06:29  Mg     2.3     07-01    TPro  6.0  /  Alb  2.8<L>  /  TBili  0.3  /  DBili  x   /  AST  11  /  ALT  9   /  AlkPhos  84  06-30      MedsMEDICATIONS  (STANDING):  albuterol/ipratropium for Nebulization 3 milliLiter(s) Nebulizer every 6 hours  calcium carbonate   1250 mG (OsCal) 1 Tablet(s) Oral daily  enoxaparin Injectable 30 milliGRAM(s) SubCutaneous every 24 hours  gabapentin 300 milliGRAM(s) Oral two times a day  levETIRAcetam 750 milliGRAM(s) Oral two times a day  linezolid  IVPB      linezolid  IVPB 600 milliGRAM(s) IV Intermittent every 12 hours  melatonin 3 milliGRAM(s) Oral at bedtime  meropenem  IVPB      meropenem  IVPB 1000 milliGRAM(s) IV Intermittent every 8 hours  midodrine 10 milliGRAM(s) Oral every 8 hours  pantoprazole   Suspension 40 milliGRAM(s) Enteral Tube daily  raloxifene 60 milliGRAM(s) Oral daily    MEDICATIONS  (PRN):  acetaminophen     Tablet .. 650 milliGRAM(s) Oral every 6 hours PRN Temp greater or equal to 38C (100.4F), Mild Pain (1 - 3)  aluminum hydroxide/magnesium hydroxide/simethicone Suspension 30 milliLiter(s) Oral every 4 hours PRN Dyspepsia  ondansetron Injectable 4 milliGRAM(s) IV Push every 8 hours PRN Nausea and/or Vomiting JERICHOTEA  58y  Female    Reason for Admission:   OVERNIGHT/INTERIM: Today is hospital day 5. Pt seen and examined at bedside during AM rounds. No acute or major events overnight.         Vital Signs Last 24 Hrs  T(C): 36.2 (01 Jul 2024 05:53), Max: 36.8 (30 Jun 2024 20:55)  T(F): 97.1 (01 Jul 2024 05:53), Max: 98.2 (30 Jun 2024 20:55)  HR: 89 (01 Jul 2024 05:53) (89 - 110)  BP: 109/60 (01 Jul 2024 05:53) (101/61 - 109/60)  BP(mean): --  RR: 18 (01 Jul 2024 05:53) (18 - 18)  SpO2: 97% (01 Jul 2024 05:53) (97% - 97%)    Parameters below as of 01 Jul 2024 05:53  Patient On (Oxygen Delivery Method): mask, Venturi  O2 Flow (L/min): 50    (Patient weaned off venturi mask at noon 7/1/24, high flow nasal cannula currently)      PHYSICAL EXAM:  GENERAL: Ill-looking  HEENT - Pupils equal and reactive to light,   NECK: Supple  HEART: Regular rate and rhythm  ABDOMEN: Soft, Nontender, Nondistended.   NEUROLOGIC:  does not follow commands   PSYCH: lethargic, nonverbal.        LABS:      LABS:                          8.9    10.21 )-----------( 384      ( 01 Jul 2024 06:29 )             28.0     07-01    143  |  102  |  13  ----------------------------<  175<H>  4.4   |  32  |  0.5<L>    Ca    8.5      01 Jul 2024 06:29  Mg     2.3     07-01    TPro  6.0  /  Alb  2.8<L>  /  TBili  0.3  /  DBili  x   /  AST  11  /  ALT  9   /  AlkPhos  84  06-30      MedsMEDICATIONS  (STANDING):  albuterol/ipratropium for Nebulization 3 milliLiter(s) Nebulizer every 6 hours  calcium carbonate   1250 mG (OsCal) 1 Tablet(s) Oral daily  enoxaparin Injectable 30 milliGRAM(s) SubCutaneous every 24 hours  gabapentin 300 milliGRAM(s) Oral two times a day  levETIRAcetam 750 milliGRAM(s) Oral two times a day  linezolid  IVPB      linezolid  IVPB 600 milliGRAM(s) IV Intermittent every 12 hours  melatonin 3 milliGRAM(s) Oral at bedtime  meropenem  IVPB      meropenem  IVPB 1000 milliGRAM(s) IV Intermittent every 8 hours  midodrine 10 milliGRAM(s) Oral every 8 hours  pantoprazole   Suspension 40 milliGRAM(s) Enteral Tube daily  raloxifene 60 milliGRAM(s) Oral daily    MEDICATIONS  (PRN):  acetaminophen     Tablet .. 650 milliGRAM(s) Oral every 6 hours PRN Temp greater or equal to 38C (100.4F), Mild Pain (1 - 3)  aluminum hydroxide/magnesium hydroxide/simethicone Suspension 30 milliLiter(s) Oral every 4 hours PRN Dyspepsia  ondansetron Injectable 4 milliGRAM(s) IV Push every 8 hours PRN Nausea and/or Vomiting

## 2024-07-01 NOTE — PROGRESS NOTE ADULT - ASSESSMENT
58F with PMH including CP, anemia, Down's syndrome, seizures, OP, hypotension, nonverbal at at baseline, here with respiratory distress, and found to have acute hypoxic respiratory failure from PNA, possibly from aspiration, admitted to medicine stepdown on pressors and IV abx.    Acute Hypoxemic Respiratory Failure due to suspected Aspiration PNA, recurrent  Septic shock (POA)  Hx Bacteremia with ESBL Proteus and VR E fecalis and fecium.  Hx of Cerebral Palsy/ Down syndrome/ Nonverbal at baseline   Elevated lactate  -  CTA chest:  a) reported Diffuse, near complete left lower lobe and upper lobe mixed consolidation and atelectasis, with some remaining aeration to left upper lobe.                        b) Occlusion of right mainstem bronchus; correlation for aspiration suggested.                        c) Near complete resolution of right lung airspace opacification, with  few residual likely inflammatory/infectious ground-glass airspace opacities.                        d) No evidence of acute pulmonary embolism.  - currently on venturi mask-attempting to wean down   - midodrine 10mg q8hrs  - so far, Ucx and blood cx NGTD, procal 0.6  - appreciate ID eval, c.w meropenem and Zyvox, dc doxycycline  - Chest PT via vest, nebs, daily chest xrays   - strict aspiration precautions, resume PEG feeds   - palliative team following, patient is currently DNR/DNI  - GI consulted to consider G_Jtube - if any benefit final recs pending     Seizure - c/w Keppra     Hx of duodenal perforation - Cont PPI     OVerall prognosis is poor, high risk of decompensation  Pending: monitor BP, c/w IV abx, resume tube feeds, GI consult, oxygen weaning

## 2024-07-01 NOTE — PROGRESS NOTE ADULT - SUBJECTIVE AND OBJECTIVE BOX
TEA ECHAVARRIA  58y, Female  Allergy: chlorhexidine containing compounds (Rash (Mild to Mod))      LOS  5d    CHIEF COMPLAINT: Sepsis (01 Jul 2024 14:18)      INTERVAL EVENTS/HPI  - No acute events overnight  - T(F): , Max: 98.2 (06-30-24 @ 20:55)  - WBC improving, tachycardic - on nasal canula  - WBC Count: 10.21 (07-01-24 @ 06:29)  WBC Count: 12.26 (06-30-24 @ 07:16)     - Creatinine: 0.5 (07-01-24 @ 06:29)  Creatinine: 0.5 (06-30-24 @ 07:16)       ROS  unable to obtain history secondary to patient's mental status and/or sedation      VITALS:  T(F): 97.8, Max: 98.2 (06-30-24 @ 20:55)  HR: 101  BP: 102/76  RR: 18Vital Signs Last 24 Hrs  T(C): 36.6 (01 Jul 2024 12:09), Max: 36.8 (30 Jun 2024 20:55)  T(F): 97.8 (01 Jul 2024 12:09), Max: 98.2 (30 Jun 2024 20:55)  HR: 101 (01 Jul 2024 12:09) (89 - 110)  BP: 102/76 (01 Jul 2024 12:09) (101/66 - 109/60)  BP(mean): --  RR: 18 (01 Jul 2024 05:53) (18 - 18)  SpO2: 97% (01 Jul 2024 12:09) (97% - 99%)    Parameters below as of 01 Jul 2024 12:09  Patient On (Oxygen Delivery Method): nasal cannula, high flow        PHYSICAL EXAM:  Gen: NAD, resting in bed  HEENT: Normocephalic, atraumatic  Neck: supple, no lymphadenopathy  CV: Regular rate & regular rhythm  Lungs: decreased BS at bases, no fremitus  Abdomen: Soft, BS present  Ext: Warm, well perfused  Neuro: non focal, awake  Skin: no rash, no erythema  Lines: no phlebitis    FH: Non-contributory  Social Hx: Non-contributory    TESTS & MEASUREMENTS:                        8.9    10.21 )-----------( 384      ( 01 Jul 2024 06:29 )             28.0     07-01    143  |  102  |  13  ----------------------------<  175<H>  4.4   |  32  |  0.5<L>    Ca    8.5      01 Jul 2024 06:29  Mg     2.3     07-01    TPro  6.0  /  Alb  2.8<L>  /  TBili  0.3  /  DBili  x   /  AST  11  /  ALT  9   /  AlkPhos  84  06-30      LIVER FUNCTIONS - ( 30 Jun 2024 07:16 )  Alb: 2.8 g/dL / Pro: 6.0 g/dL / ALK PHOS: 84 U/L / ALT: 9 U/L / AST: 11 U/L / GGT: x           Urinalysis Basic - ( 01 Jul 2024 06:29 )    Color: x / Appearance: x / SG: x / pH: x  Gluc: 175 mg/dL / Ketone: x  / Bili: x / Urobili: x   Blood: x / Protein: x / Nitrite: x   Leuk Esterase: x / RBC: x / WBC x   Sq Epi: x / Non Sq Epi: x / Bacteria: x        Culture - Blood (collected 06-29-24 @ 17:33)  Source: .Blood None  Preliminary Report (07-01-24 @ 02:02):    No growth at 24 hours    Urinalysis with Rflx Culture (collected 06-26-24 @ 22:10)    Culture - Urine (collected 06-26-24 @ 22:10)  Source: Catheterized None  Final Report (06-28-24 @ 21:21):    <10,000 CFU/mL Normal Urogenital Mili    Culture - Blood (collected 06-26-24 @ 19:14)  Source: .Blood Blood-Peripheral  Preliminary Report (06-30-24 @ 23:01):    No growth at 4 days    Culture - Blood (collected 06-26-24 @ 19:14)  Source: .Blood Blood-Peripheral  Preliminary Report (06-30-24 @ 23:01):    No growth at 4 days    Culture - Urine (collected 06-02-24 @ 00:05)  Source: Catheterized None  Final Report (06-03-24 @ 09:27):    <10,000 CFU/mL Normal Urogenital Mili    Urinalysis with Rflx Culture (collected 06-02-24 @ 00:05)        Lactate, Blood: 1.5 mmol/L (06-29-24 @ 05:42)  Lactate, Blood: 2.7 mmol/L (06-28-24 @ 11:00)  Lactate, Blood: 3.9 mmol/L (06-27-24 @ 04:40)  Lactate, Blood: 5.4 mmol/L (06-26-24 @ 23:00)  Lactate, Blood: 2.4 mmol/L (06-26-24 @ 19:10)      INFECTIOUS DISEASES TESTING  Procalcitonin: 0.60 (06-28-24 @ 11:00)  Procalcitonin: 0.11 (06-12-24 @ 11:58)  MRSA PCR Result.: Positive (06-01-24 @ 13:02)  Procalcitonin: 0.28 (06-01-24 @ 11:14)  Procalcitonin: 0.62 (06-01-24 @ 04:17)  Rapid RVP Result: NotDetec (06-01-24 @ 00:15)  Procalcitonin: 0.59 (05-06-24 @ 10:50)  Procalcitonin: 0.16 (05-03-24 @ 18:01)  Aspergillus Galactomannan Antigen: 0.03 (05-03-24 @ 18:01)  Fungitell: 86 (05-03-24 @ 18:00)  Fungitell: 53 (04-25-24 @ 21:33)  Fungitell: 53 (04-24-24 @ 05:57)  MRSA PCR Result.: Negative (04-16-24 @ 14:39)  Rapid RVP Result: NotDetec (04-16-24 @ 14:39)  Procalcitonin, Serum: 6.93 (04-16-24 @ 07:03)  MRSA PCR Result.: Negative (03-16-24 @ 23:45)  Fungitell: 81 (03-13-24 @ 17:24)  Procalcitonin, Serum: 0.19 (03-13-24 @ 07:08)  Fungitell: 73 (02-23-24 @ 18:19)  MRSA PCR Result.: Negative (02-20-24 @ 13:42)  Fungitell: 76 (02-19-24 @ 16:00)  Procalcitonin, Serum: 0.16 (02-19-24 @ 16:00)  Procalcitonin, Serum: 0.20 (02-19-24 @ 11:23)  Rapid RVP Result: NotDetec (02-17-24 @ 19:06)  Procalcitonin, Serum: 0.11 (02-17-24 @ 10:41)  MRSA PCR Result.: Negative (02-15-24 @ 06:20)  Procalcitonin, Serum: 0.10 (02-12-24 @ 11:17)  Rapid RVP Result: NotDetec (02-12-24 @ 10:28)  MRSA PCR Result.: Negative (02-12-24 @ 10:28)  Fungitell: 63 (02-06-24 @ 11:27)  Fungitell: 65 (02-06-24 @ 04:43)  Rapid RVP Result: NotDetec (02-04-24 @ 10:28)  MRSA PCR Result.: Negative (02-03-24 @ 21:32)  Procalcitonin, Serum: 0.15 (02-03-24 @ 11:13)  Procalcitonin, Serum: 0.22 (02-01-24 @ 16:00)  MRSA PCR Result.: Negative (01-22-24 @ 05:30)  Legionella Antigen, Urine: Negative (01-21-24 @ 05:00)  Rapid RVP Result: Detected (01-21-24 @ 00:58)  Procalcitonin, Serum: 0.51 (01-20-24 @ 21:23)  Fungitell: 325 (01-20-24 @ 21:23)  Fungitell: 138 (11-07-23 @ 08:08)  Fungitell: 115 (11-02-23 @ 11:40)  Procalcitonin, Serum: 0.04 (11-02-23 @ 11:40)  Procalcitonin, Serum: 0.26 (10-24-23 @ 11:00)  MRSA PCR Result.: Negative (10-17-23 @ 17:20)  Fungitell: >500 (10-17-23 @ 17:20)  Procalcitonin, Serum: 4.36 (10-17-23 @ 17:20)  Procalcitonin, Serum: 4.18 (10-17-23 @ 12:35)  Procalcitonin, Serum: 0.07 (10-15-23 @ 07:28)  COVID-19 PCR: NotDetec (10-12-23 @ 09:14)  Procalcitonin, Serum: 0.40 (10-07-23 @ 10:54)  Legionella Antigen, Urine: Negative (09-30-23 @ 14:36)  MRSA PCR Result.: Negative (09-29-23 @ 16:50)  Procalcitonin, Serum: 9.78 (08-12-23 @ 11:25)  MRSA PCR Result.: Negative (08-12-23 @ 06:10)  Rapid RVP Result: NotDetec (08-12-23 @ 01:33)  Procalcitonin, Serum: 0.63 (08-10-23 @ 08:46)  Procalcitonin, Serum: 7.82 (08-04-23 @ 16:13)  MRSA PCR Result.: Negative (08-04-23 @ 14:52)  Rapid RVP Result: NotDetec (08-04-23 @ 03:00)      INFLAMMATORY MARKERS  Sedimentation Rate, Erythrocyte: 13 mm/Hr (05-21-24 @ 07:06)  C-Reactive Protein: 31.9 mg/L (05-21-24 @ 07:06)  Sedimentation Rate, Erythrocyte: 100 mm/Hr (02-03-24 @ 11:13)  C-Reactive Protein, Serum: 214.2 mg/L (02-03-24 @ 11:13)      RADIOLOGY & ADDITIONAL TESTS:  I have personally reviewed the last available Chest xray  CXR      CT      CARDIOLOGY TESTING  12 Lead ECG:   Ventricular Rate 140 BPM    Atrial Rate 67 BPM    P-R Interval 154 ms    QRS Duration 68 ms    Q-T Interval 286 ms    QTC Calculation(Bazett) 436 ms    P Axis 104 degrees    R Axis 97 degrees    T Axis 67 degrees    Diagnosis Line *** Poor data quality, interpretation may be adversely affected  Baseline artifact  Undetermined rhythm  Rightward axis  Low voltage QRS  Abnormal ECG    Confirmed by Justice Stockton ([id number]) on 6/27/2024 8:53:18 PM (06-27-24 @ 18:02)  12 Lead ECG:   Ventricular Rate 106 BPM    Atrial Rate 106 BPM    P-R Interval 118 ms    QRS Duration 72 ms    Q-T Interval 346 ms    QTC Calculation(Bazett) 459 ms    P Axis 55 degrees    R Axis 60 degrees    T Axis 49 degrees    Diagnosis Line Sinus tachycardia  Otherwise normal ECG    Confirmed by MARJAN MENDES MD ([id number]) on 6/27/2024 6:41:47 AM (06-27-24 @ 04:52)      MEDICATIONS  albuterol/ipratropium for Nebulization 3 Nebulizer every 6 hours  calcium carbonate   1250 mG (OsCal) 1 Oral daily  enoxaparin Injectable 30 SubCutaneous every 24 hours  gabapentin 300 Oral two times a day  levETIRAcetam  Solution 750 Oral two times a day  linezolid  IVPB 600 IV Intermittent every 12 hours  linezolid  IVPB     melatonin 3 Oral at bedtime  meropenem  IVPB 1000 IV Intermittent every 8 hours  meropenem  IVPB     midodrine 10 Oral every 8 hours  pantoprazole   Suspension 40 Enteral Tube daily  raloxifene 60 Oral daily  sodium chloride 3%  Inhalation 4 Inhalation two times a day      WEIGHT  Weight (kg): 41 (06-26-24 @ 19:01)  Creatinine: 0.5 mg/dL (07-01-24 @ 06:29)      ANTIBIOTICS:  linezolid  IVPB      linezolid  IVPB 600 milliGRAM(s) IV Intermittent every 12 hours  meropenem  IVPB      meropenem  IVPB 1000 milliGRAM(s) IV Intermittent every 8 hours      All available historical records have been reviewed

## 2024-07-01 NOTE — CONSULT NOTE ADULT - ATTENDING COMMENTS
Agree with the above.  Patient with recurrent aspirations being evaluated for enteric feeding and consideration of PEJ.  From GI standpoint, may offer PEJ if guardian is agreeable and may then use PEG for venting.  It is to note however, that this may not decrease the chance of aspiration which may be due to oropharyngeal secretions.  GI DVT prophylaxis.  Rest of recommendations per the note above.

## 2024-07-01 NOTE — PROGRESS NOTE ADULT - SUBJECTIVE AND OBJECTIVE BOX
Patient is a 58y old  Female who presents with a chief complaint of Sepsis (01 Jul 2024 09:33)      Patient seen and examined at bedside.    ALLERGIES:  chlorhexidine containing compounds (Rash (Mild to Mod))    MEDICATIONS:  acetaminophen     Tablet .. 650 milliGRAM(s) Oral every 6 hours PRN  albuterol/ipratropium for Nebulization 3 milliLiter(s) Nebulizer every 6 hours  aluminum hydroxide/magnesium hydroxide/simethicone Suspension 30 milliLiter(s) Oral every 4 hours PRN  calcium carbonate   1250 mG (OsCal) 1 Tablet(s) Oral daily  enoxaparin Injectable 30 milliGRAM(s) SubCutaneous every 24 hours  gabapentin 300 milliGRAM(s) Oral two times a day  levETIRAcetam 750 milliGRAM(s) Oral two times a day  linezolid  IVPB      linezolid  IVPB 600 milliGRAM(s) IV Intermittent every 12 hours  melatonin 3 milliGRAM(s) Oral at bedtime  meropenem  IVPB      meropenem  IVPB 1000 milliGRAM(s) IV Intermittent every 8 hours  midodrine 10 milliGRAM(s) Oral every 8 hours  ondansetron Injectable 4 milliGRAM(s) IV Push every 8 hours PRN  pantoprazole   Suspension 40 milliGRAM(s) Enteral Tube daily  raloxifene 60 milliGRAM(s) Oral daily    Vital Signs Last 24 Hrs  T(F): 97.8 (01 Jul 2024 12:09), Max: 98.2 (30 Jun 2024 20:55)  HR: 101 (01 Jul 2024 12:09) (89 - 110)  BP: 102/76 (01 Jul 2024 12:09) (101/66 - 109/60)  RR: 18 (01 Jul 2024 05:53) (18 - 18)  SpO2: 97% (01 Jul 2024 12:09) (97% - 99%)  I&O's Summary    30 Jun 2024 07:01  -  01 Jul 2024 07:00  --------------------------------------------------------  IN: 0 mL / OUT: 900 mL / NET: -900 mL        PHYSICAL EXAM:  General: NAD, alert   ENT: MMM  Neck: Supple, No JVD  Lungs: diminished bilaterally, no crackles   Cardio: RRR, S1/S2, No murmurs  Abdomen: Soft, Nontender, Nondistended; Bowel sounds present  Extremities: No cyanosis, No edema, muscle atrophy    LABS:                        8.9    10.21 )-----------( 384      ( 01 Jul 2024 06:29 )             28.0     07-01    143  |  102  |  13  ----------------------------<  175  4.4   |  32  |  0.5    Ca    8.5      01 Jul 2024 06:29  Mg     2.3     07-01    TPro  6.0  /  Alb  2.8  /  TBili  0.3  /  DBili  x   /  AST  11  /  ALT  9   /  AlkPhos  84  06-30        Lactate, Blood: 1.5 mmol/L (06-29 @ 05:42)        06-01 Chol 103 mg/dL LDL -- HDL 31 mg/dL Trig 90 mg/dL                  Urinalysis Basic - ( 01 Jul 2024 06:29 )    Color: x / Appearance: x / SG: x / pH: x  Gluc: 175 mg/dL / Ketone: x  / Bili: x / Urobili: x   Blood: x / Protein: x / Nitrite: x   Leuk Esterase: x / RBC: x / WBC x   Sq Epi: x / Non Sq Epi: x / Bacteria: x        Culture - Blood (collected 29 Jun 2024 17:33)  Source: .Blood None  Preliminary Report (01 Jul 2024 02:02):    No growth at 24 hours    Culture - Urine (collected 26 Jun 2024 22:10)  Source: Catheterized None  Final Report (28 Jun 2024 21:21):    <10,000 CFU/mL Normal Urogenital Mili    Culture - Blood (collected 26 Jun 2024 19:14)  Source: .Blood Blood-Peripheral  Preliminary Report (30 Jun 2024 23:01):    No growth at 4 days    Culture - Blood (collected 26 Jun 2024 19:14)  Source: .Blood Blood-Peripheral  Preliminary Report (30 Jun 2024 23:01):    No growth at 4 days          RADIOLOGY & ADDITIONAL TESTS:    Care Discussed with Consultants/Other Providers:

## 2024-07-01 NOTE — PROGRESS NOTE ADULT - ASSESSMENT
ASSESSMENT  59 yo F with hx of down syndrome, anemia, cerebral palsy, seizure disorder, osteoporosis, hypotension on midodrine, nonverbal at baseline who was BIBEMS from group home for respiratory distress with complaints of fever and hypoxia.     IMPRESSION  #Septic Shock  #Acute Hypoxemic Respiratory failure   #Severe bacterial pneumonia, possible GN pneumonia   #Cerebral Palsy  #seizure Disorder    #DM   #Abx allergy:     RECOMMENDATIONS  - continue Linezolid 600 mg q 12 hours   - continue meropenem 1g q 8hours   - given colonization history, will continue current antibiotics   - plan for 10 days total (end date 7/5) --can change Linezolid to PO, and eventually switch meropenem to ertapenem 1g daily via midline if discharged    Please call or message on [employer] Teams if with any questions.  Spectra [phone]

## 2024-07-02 LAB
ANION GAP SERPL CALC-SCNC: 11 MMOL/L — SIGNIFICANT CHANGE UP (ref 7–14)
BASOPHILS # BLD AUTO: 0.03 K/UL — SIGNIFICANT CHANGE UP (ref 0–0.2)
BASOPHILS NFR BLD AUTO: 0.4 % — SIGNIFICANT CHANGE UP (ref 0–1)
BUN SERPL-MCNC: 8 MG/DL — LOW (ref 10–20)
CALCIUM SERPL-MCNC: 8.4 MG/DL — SIGNIFICANT CHANGE UP (ref 8.4–10.5)
CHLORIDE SERPL-SCNC: 100 MMOL/L — SIGNIFICANT CHANGE UP (ref 98–110)
CO2 SERPL-SCNC: 27 MMOL/L — SIGNIFICANT CHANGE UP (ref 17–32)
CREAT SERPL-MCNC: <0.5 MG/DL — LOW (ref 0.7–1.5)
EGFR: 115 ML/MIN/1.73M2 — SIGNIFICANT CHANGE UP
EOSINOPHIL # BLD AUTO: 0.48 K/UL — SIGNIFICANT CHANGE UP (ref 0–0.7)
EOSINOPHIL NFR BLD AUTO: 7 % — SIGNIFICANT CHANGE UP (ref 0–8)
GLUCOSE SERPL-MCNC: 157 MG/DL — HIGH (ref 70–99)
HCT VFR BLD CALC: 31.1 % — LOW (ref 37–47)
HGB BLD-MCNC: 9.4 G/DL — LOW (ref 12–16)
IMM GRANULOCYTES NFR BLD AUTO: 0.9 % — HIGH (ref 0.1–0.3)
LYMPHOCYTES # BLD AUTO: 1.79 K/UL — SIGNIFICANT CHANGE UP (ref 1.2–3.4)
LYMPHOCYTES # BLD AUTO: 26.2 % — SIGNIFICANT CHANGE UP (ref 20.5–51.1)
MCHC RBC-ENTMCNC: 29 PG — SIGNIFICANT CHANGE UP (ref 27–31)
MCHC RBC-ENTMCNC: 30.2 G/DL — LOW (ref 32–37)
MCV RBC AUTO: 96 FL — SIGNIFICANT CHANGE UP (ref 81–99)
MONOCYTES # BLD AUTO: 0.46 K/UL — SIGNIFICANT CHANGE UP (ref 0.1–0.6)
MONOCYTES NFR BLD AUTO: 6.7 % — SIGNIFICANT CHANGE UP (ref 1.7–9.3)
NEUTROPHILS # BLD AUTO: 4.02 K/UL — SIGNIFICANT CHANGE UP (ref 1.4–6.5)
NEUTROPHILS NFR BLD AUTO: 58.8 % — SIGNIFICANT CHANGE UP (ref 42.2–75.2)
NRBC # BLD: 0 /100 WBCS — SIGNIFICANT CHANGE UP (ref 0–0)
PLATELET # BLD AUTO: 339 K/UL — SIGNIFICANT CHANGE UP (ref 130–400)
PMV BLD: 10.3 FL — SIGNIFICANT CHANGE UP (ref 7.4–10.4)
POTASSIUM SERPL-MCNC: 4.7 MMOL/L — SIGNIFICANT CHANGE UP (ref 3.5–5)
POTASSIUM SERPL-SCNC: 4.7 MMOL/L — SIGNIFICANT CHANGE UP (ref 3.5–5)
RBC # BLD: 3.24 M/UL — LOW (ref 4.2–5.4)
RBC # FLD: 19.7 % — HIGH (ref 11.5–14.5)
SODIUM SERPL-SCNC: 138 MMOL/L — SIGNIFICANT CHANGE UP (ref 135–146)
WBC # BLD: 6.84 K/UL — SIGNIFICANT CHANGE UP (ref 4.8–10.8)
WBC # FLD AUTO: 6.84 K/UL — SIGNIFICANT CHANGE UP (ref 4.8–10.8)

## 2024-07-02 PROCEDURE — 71045 X-RAY EXAM CHEST 1 VIEW: CPT | Mod: 26

## 2024-07-02 PROCEDURE — 99232 SBSQ HOSP IP/OBS MODERATE 35: CPT

## 2024-07-02 RX ORDER — LINEZOLID 600 MG/1
600 TABLET, FILM COATED ORAL EVERY 12 HOURS
Refills: 0 | Status: COMPLETED | OUTPATIENT
Start: 2024-07-02 | End: 2024-07-05

## 2024-07-02 RX ORDER — COLLAGENASE CLOSTRIDIUM HIST. 250 UNIT/G
1 OINTMENT (GRAM) TOPICAL
Refills: 0 | Status: DISCONTINUED | OUTPATIENT
Start: 2024-07-02 | End: 2024-07-10

## 2024-07-02 RX ADMIN — PANTOPRAZOLE SODIUM 40 MILLIGRAM(S): 40 INJECTION, POWDER, FOR SOLUTION INTRAVENOUS at 12:18

## 2024-07-02 RX ADMIN — Medication 3 MILLIGRAM(S): at 22:37

## 2024-07-02 RX ADMIN — ENOXAPARIN SODIUM 30 MILLIGRAM(S): 100 INJECTION SUBCUTANEOUS at 12:18

## 2024-07-02 RX ADMIN — LEVETIRACETAM 750 MILLIGRAM(S): 100 INJECTION INTRAVENOUS at 06:17

## 2024-07-02 RX ADMIN — LEVETIRACETAM 750 MILLIGRAM(S): 100 INJECTION INTRAVENOUS at 17:59

## 2024-07-02 RX ADMIN — IPRATROPIUM BROMIDE AND ALBUTEROL SULFATE 3 MILLILITER(S): .5; 3 SOLUTION RESPIRATORY (INHALATION) at 07:39

## 2024-07-02 RX ADMIN — MEROPENEM 100 MILLIGRAM(S): 500 INJECTION, POWDER, FOR SOLUTION INTRAVENOUS at 06:16

## 2024-07-02 RX ADMIN — MEROPENEM 100 MILLIGRAM(S): 500 INJECTION, POWDER, FOR SOLUTION INTRAVENOUS at 13:47

## 2024-07-02 RX ADMIN — IPRATROPIUM BROMIDE AND ALBUTEROL SULFATE 3 MILLILITER(S): .5; 3 SOLUTION RESPIRATORY (INHALATION) at 20:41

## 2024-07-02 RX ADMIN — LINEZOLID 300 MILLIGRAM(S): 600 TABLET, FILM COATED ORAL at 06:17

## 2024-07-02 RX ADMIN — MIDODRINE HYDROCHLORIDE 10 MILLIGRAM(S): 10 TABLET ORAL at 22:38

## 2024-07-02 RX ADMIN — Medication 1 TABLET(S): at 12:18

## 2024-07-02 RX ADMIN — LINEZOLID 300 MILLIGRAM(S): 600 TABLET, FILM COATED ORAL at 17:58

## 2024-07-02 RX ADMIN — RALOXIFENE HYDROCHLORIDE 60 MILLIGRAM(S): 60 TABLET, FILM COATED ORAL at 12:19

## 2024-07-02 RX ADMIN — Medication 300 MILLIGRAM(S): at 17:59

## 2024-07-02 RX ADMIN — Medication 1 APPLICATION(S): at 17:58

## 2024-07-02 RX ADMIN — MIDODRINE HYDROCHLORIDE 10 MILLIGRAM(S): 10 TABLET ORAL at 06:38

## 2024-07-02 RX ADMIN — MEROPENEM 100 MILLIGRAM(S): 500 INJECTION, POWDER, FOR SOLUTION INTRAVENOUS at 22:38

## 2024-07-02 RX ADMIN — MIDODRINE HYDROCHLORIDE 10 MILLIGRAM(S): 10 TABLET ORAL at 13:53

## 2024-07-02 RX ADMIN — Medication 300 MILLIGRAM(S): at 06:17

## 2024-07-02 RX ADMIN — IPRATROPIUM BROMIDE AND ALBUTEROL SULFATE 3 MILLILITER(S): .5; 3 SOLUTION RESPIRATORY (INHALATION) at 11:48

## 2024-07-02 NOTE — PROGRESS NOTE ADULT - ASSESSMENT
58F with PMH including CP, anemia, Down's syndrome, seizures, OP, hypotension, nonverbal at at baseline, here with respiratory distress, and found to have acute hypoxic respiratory failure from PNA, possibly from aspiration, admitted to medicine stepdown on pressors and IV abx.    #Acute Hypoxemic Respiratory Failure due to suspected Aspiration PNA, recurrent  #Septic shock (POA)  #Hx Bacteremia with ESBL Proteus and VR E fecalis and fecium.  #Hx of Cerebral Palsy/ Down syndrome/ Nonverbal at baseline   - Last Lactate 1.5 (29/6) down from 2.7, 3.9, 5.4.   - CTA chest (6/27/24):  a) reported Diffuse, near complete left lower lobe and upper lobe mixed consolidation and atelectasis, with some remaining aeration to left upper lobe.                        b) Occlusion of right mainstem bronchus; correlation for aspiration suggested.                        c) Near complete resolution of right lung airspace opacification, with  few residual likely inflammatory/infectious ground-glass airspace opacities.                        d) No evidence of acute pulmonary embolism.  - Weaned down from venturi mask (7/1/24), currently on high flow nasal cannula 6L. Target is to decrease it to 4L in prep for discharge  - Midodrine 10mg q8hrs  - so far, Ucx and blood cx NGTD, procal 0.6  - appreciate ID eval, cw meropenem and Zyvox (switched to PO, per ID recommendations 7/1/24), d/c doxycycline  - Chest PT via vest, nebs, daily chest xrays   - strict aspiration precautions, resume PEG feeds   - palliative team following: patient is already DNR/DNI, MOLST checklist completed on prior admission with [facility] and [facility]  - GI consulted to consider G_Jtube, they do not recommend it at this time as no benefit with aspiration.     #Seizure   - c/w Keppra     #Hx of duodenal perforation  - Continue PPI     #Pressure ulcers, bilaterally  - Wound care consulted, appreciate recs  - Burn consult pending    Overall prognosis is poor, high risk of decompensation  Pending: monitor BP, c/w IV abx, resume tube feeds, GI consult, oxygen weaning

## 2024-07-02 NOTE — ADVANCED PRACTICE NURSE CONSULT - ASSESSMENT
History of Present Illness:   57 yo F with hx of down syndrome, anemia, cerebral palsy, seizure disorder, osteoporosis, hypotension on midodrine, nonverbal at baseline who was BIBEMS from group home for respiratory distress with complaints of fever and hypoxia. Per EMS, pt was hypoxic to 80s and was put on non-rebreather mask. Pt was admitted recently from 5/31 - 6/13 for septic shock and acute hypoxic and hypercapnic respiratory failure 2/2 recurrent CAP. On arrival patient was tachycardic to 137. was febrile to 102.4 F and was tachypneic to 30s. The patient was put on AVAPS with patient saturating 92 %, was given STAT dose of cefepime and vancomycin, got 1250 cc of bolus (30 cc/kg per sepsis protocol).  ABG was done which showed pH of 7.4 with pO2 of 81 and Pco2 of 42 with lactate of 2.8. Patient being admitted to medicine for further evaluation and managment of severe sepsis.     Allergies and Intolerances:        Allergies:  	chlorhexidine containing compounds: Drug Category, Rash (Mild to Mod)    Home Medications:   * Patient Currently Takes Medications as of 12-Jun-2024 14:05 documented in Structured Notes  · 	nystatin 100,000 units/g topical powder: Apply topically to affected area 3 times a day 1 Apply topically to affected area 3 times a day  · 	gabapentin 250 mg/5 mL oral solution: 6 milliliter(s) orally every 12 hours  · 	triamcinolone 0.1% topical cream: Apply topically to affected area 2 times a day 1 Apply topically to affected area 2 times a day  · 	Keppra 100 mg/mL oral solution: 7.5 milliliter(s) orally 2 times a day by peg  · 	docusate sodium 100 mg oral tablet: 1 tab(s) by PEG tube once a day as needed for  constipation  · 	Protonix 40 mg oral delayed release tablet: 1 tab(s) by PEG tube once a day  · 	Oyster Shell 500 (1250 mg calcium carbonate) oral tablet: 1 tab(s) by PEG tube once a day  · 	Pepcid 40 mg oral tablet: 1 tab(s) by PEG tube 2 times a day  · 	senna leaf extract oral tablet: 2 tab(s) orally once a day (at bedtime)  · 	petrolatum topical ointment: 1 Apply topically to affected area 2 times a day  · 	ipratropium-albuterol 0.5 mg-2.5 mg/3 mL inhalation solution: 3 milliliter(s) inhaled every 6 hours as needed for  shortness of breath and/or wheezing  · 	ocular lubricant ophthalmic solution: 1 drop(s) to each affected eye 2 times a day  · 	midodrine 10 mg oral tablet: 1 tab(s) orally 3 times a day Please discontinue 5mg TID dose, and start 10mg TID dose  · 	Melatonin 3 mg oral tablet: 1 tab(s) by PEG tube once a day (at bedtime)  · 	raloxifene 60 mg oral tablet: 1 tab(s) by PEG tube once a day    Patient received lying in bed. Awake. Limited mobility. Incontinent of stool. Madsen in place. High risk for pressure injury development and progression.    Pressure Injury to right heel and right medial foot. Dark pink,. No exudate, no odor.     Pressure Injuries to Left medial foot-pink, left medial heel-pink, and left lateral malleolus- pink/yellow. No exudate, no odor.  Patient seen previous admission.

## 2024-07-02 NOTE — PROGRESS NOTE ADULT - ASSESSMENT
58F with PMH including CP, anemia, Down's syndrome, seizures, OP, hypotension, nonverbal at at baseline, here with respiratory distress, and found to have acute hypoxic respiratory failure from PNA, possibly from aspiration, admitted to medicine stepdown on pressors and IV abx.    Acute Hypoxemic Respiratory Failure due to suspected Aspiration PNA, recurrent  Septic shock (POA)  Hx Bacteremia with ESBL Proteus and VR E fecalis and fecium.  Hx of Cerebral Palsy/ Down syndrome/ Nonverbal at baseline   Elevated lactate  -  CTA chest:  a) reported Diffuse, near complete left lower lobe and upper lobe mixed consolidation and atelectasis, with some remaining aeration to left upper lobe.                        b) Occlusion of right mainstem bronchus; correlation for aspiration suggested.                        c) Near complete resolution of right lung airspace opacification, with  few residual likely inflammatory/infectious ground-glass airspace opacities.                        d) No evidence of acute pulmonary embolism.  - currently on venturi mask-attempting to wean down   - midodrine 10mg q8hrs  - so far, Ucx and blood cx NGTD, procal 0.6  - appreciate ID eval, c.w meropenem and Zyvox, dc doxycycline--plan for 10 days today of antibiotics - last day of antibiotics 7/5  - Chest PT via vest, nebs, daily chest xrays   - strict aspiration precautions, resume PEG feeds   - palliative team following, patient is currently DNR/DNI  - GI consult appreciated - GJ tube was not recommended for a way to prevent aspiration     Seizure - c/w Keppra     Hx of duodenal perforation - Cont PPI    Foot pressure ulcers - wound care appreciated, burn consult pending      OVerall prognosis is poor, high risk of decompensation  Pending: monitor BP, c/w IV abx, burn consult , oxygen weaning    Dispo: Group Home

## 2024-07-02 NOTE — ADVANCED PRACTICE NURSE CONSULT - RECOMMEDATIONS
Cleanse wounds to bilateral feet with Vashe wound cleanser (stocked in the store room).   Pat dry apply, Medihoney, cover with Xeroform, wrap with Kerlix twice a day and prn for soiling.     Maintain pressure injury prevention.   Keep skin clean.   Maintain incontinence care.   Monitor skin for changes and notify provider   Case discussed with primary RN

## 2024-07-02 NOTE — PROGRESS NOTE ADULT - SUBJECTIVE AND OBJECTIVE BOX
JERICHOTEA  58y  Female    Reason for Admission:   OVERNIGHT/INTERIM: Pt seen and examined at bedside during AM rounds. Examined pressure ulcers bilaterally and changed dressing. Of note, patient had an episode of diarrhea last night. No other acute or significant events otherwise.       Vital Signs Last 24 Hrs  T(C): 36.9 (02 Jul 2024 13:35), Max: 37 (01 Jul 2024 19:45)  T(F): 98.4 (02 Jul 2024 13:35), Max: 98.6 (01 Jul 2024 19:45)  HR: 120 (02 Jul 2024 13:35) (81 - 120)  BP: 90/51 (02 Jul 2024 13:35) (78/50 - 100/66)  BP(mean): 77 (02 Jul 2024 06:27) (77 - 77)  RR: 20 (02 Jul 2024 13:35) (18 - 20)  SpO2: 95% (02 Jul 2024 13:35) (95% - 97%)    Parameters below as of 02 Jul 2024 06:27  Patient On (Oxygen Delivery Method): nasal cannula        PHYSICAL EXAM:  General: NAD, alert   ENT: MMM  Neck: Supple, No JVD  Lungs: diminished bilaterally, no crackles   Cardio: RRR, S1/S2, No murmurs  Abdomen: Soft, Nontender, Nondistended; Bowel sounds present  Extremities: No cyanosis, No edema, muscle atrophy  NEUROLOGIC:  does not follow commands   PSYCH: lethargic, nonverbal.      LABS:               9.4    6.84  )-----------( 339      ( 02 Jul 2024 07:53 )             31.1     07-02    138  |  100  |  8<L>  ----------------------------<  157<H>  4.7   |  27  |  <0.5<L>    Ca    8.4      02 Jul 2024 07:53  Mg     2.3     07-01      MedsMEDICATIONS  (STANDING):  albuterol/ipratropium for Nebulization 3 milliLiter(s) Nebulizer every 6 hours  calcium carbonate   1250 mG (OsCal) 1 Tablet(s) Oral daily  collagenase Ointment 1 Application(s) Topical two times a day  enoxaparin Injectable 30 milliGRAM(s) SubCutaneous every 24 hours  gabapentin 300 milliGRAM(s) Oral two times a day  levETIRAcetam  Solution 750 milliGRAM(s) Oral two times a day  linezolid  IVPB 600 milliGRAM(s) IV Intermittent every 12 hours  melatonin 3 milliGRAM(s) Oral at bedtime  meropenem  IVPB      meropenem  IVPB 1000 milliGRAM(s) IV Intermittent every 8 hours  midodrine 10 milliGRAM(s) Oral every 8 hours  pantoprazole   Suspension 40 milliGRAM(s) Enteral Tube daily  raloxifene 60 milliGRAM(s) Oral daily  sodium chloride 3%  Inhalation 4 milliLiter(s) Inhalation two times a day    MEDICATIONS  (PRN):  acetaminophen     Tablet .. 650 milliGRAM(s) Oral every 6 hours PRN Temp greater or equal to 38C (100.4F), Mild Pain (1 - 3)  aluminum hydroxide/magnesium hydroxide/simethicone Suspension 30 milliLiter(s) Oral every 4 hours PRN Dyspepsia  ondansetron Injectable 4 milliGRAM(s) IV Push every 8 hours PRN Nausea and/or Vomiting

## 2024-07-02 NOTE — PROGRESS NOTE ADULT - SUBJECTIVE AND OBJECTIVE BOX
Patient is a 58y old  Female who presents with a chief complaint of Sepsis (02 Jul 2024 15:02)      Patient seen and examined at bedside.    ALLERGIES:  chlorhexidine containing compounds (Rash (Mild to Mod))    MEDICATIONS:  acetaminophen     Tablet .. 650 milliGRAM(s) Oral every 6 hours PRN  albuterol/ipratropium for Nebulization 3 milliLiter(s) Nebulizer every 6 hours  aluminum hydroxide/magnesium hydroxide/simethicone Suspension 30 milliLiter(s) Oral every 4 hours PRN  calcium carbonate   1250 mG (OsCal) 1 Tablet(s) Oral daily  collagenase Ointment 1 Application(s) Topical two times a day  enoxaparin Injectable 30 milliGRAM(s) SubCutaneous every 24 hours  gabapentin 300 milliGRAM(s) Oral two times a day  levETIRAcetam  Solution 750 milliGRAM(s) Oral two times a day  linezolid  IVPB 600 milliGRAM(s) IV Intermittent every 12 hours  melatonin 3 milliGRAM(s) Oral at bedtime  meropenem  IVPB 1000 milliGRAM(s) IV Intermittent every 8 hours  meropenem  IVPB      midodrine 10 milliGRAM(s) Oral every 8 hours  ondansetron Injectable 4 milliGRAM(s) IV Push every 8 hours PRN  pantoprazole   Suspension 40 milliGRAM(s) Enteral Tube daily  raloxifene 60 milliGRAM(s) Oral daily  sodium chloride 3%  Inhalation 4 milliLiter(s) Inhalation two times a day    Vital Signs Last 24 Hrs  T(F): 98.4 (02 Jul 2024 13:35), Max: 98.6 (01 Jul 2024 19:45)  HR: 120 (02 Jul 2024 13:35) (81 - 120)  BP: 90/51 (02 Jul 2024 13:35) (78/50 - 100/66)  RR: 20 (02 Jul 2024 13:35) (18 - 20)  SpO2: 95% (02 Jul 2024 13:35) (95% - 97%)  I&O's Summary    01 Jul 2024 07:01  -  02 Jul 2024 07:00  --------------------------------------------------------  IN: 0 mL / OUT: 1900 mL / NET: -1900 mL    02 Jul 2024 07:01  -  02 Jul 2024 16:41  --------------------------------------------------------  IN: 0 mL / OUT: 1200 mL / NET: -1200 mL        PHYSICAL EXAM:  General: NAD, alert  ENT: MMM  Neck: Supple, No JVD  Lungs: left side coarse crackles   Cardio: RRR, S1/S2, 2/6 systolic murmur   Abdomen: Soft, Nontender, Nondistended; Bowel sounds present  Extremities: No cyanosis, No edema    LABS:                        9.4    6.84  )-----------( 339      ( 02 Jul 2024 07:53 )             31.1     07-02    138  |  100  |  8   ----------------------------<  157  4.7   |  27  |  <0.5    Ca    8.4      02 Jul 2024 07:53  Mg     2.3     07-01    TPro  6.0  /  Alb  2.8  /  TBili  0.3  /  DBili  x   /  AST  11  /  ALT  9   /  AlkPhos  84  06-30              06-01 Chol 103 mg/dL LDL -- HDL 31 mg/dL Trig 90 mg/dL                  Urinalysis Basic - ( 02 Jul 2024 07:53 )    Color: x / Appearance: x / SG: x / pH: x  Gluc: 157 mg/dL / Ketone: x  / Bili: x / Urobili: x   Blood: x / Protein: x / Nitrite: x   Leuk Esterase: x / RBC: x / WBC x   Sq Epi: x / Non Sq Epi: x / Bacteria: x        Culture - Blood (collected 29 Jun 2024 17:33)  Source: .Blood None  Preliminary Report (02 Jul 2024 02:02):    No growth at 48 Hours    Culture - Urine (collected 26 Jun 2024 22:10)  Source: Catheterized None  Final Report (28 Jun 2024 21:21):    <10,000 CFU/mL Normal Urogenital Mili    Culture - Blood (collected 26 Jun 2024 19:14)  Source: .Blood Blood-Peripheral  Final Report (01 Jul 2024 23:00):    No growth at 5 days    Culture - Blood (collected 26 Jun 2024 19:14)  Source: .Blood Blood-Peripheral  Final Report (01 Jul 2024 23:00):    No growth at 5 days          RADIOLOGY & ADDITIONAL TESTS:    Care Discussed with Consultants/Other Providers:

## 2024-07-03 LAB
ANION GAP SERPL CALC-SCNC: 11 MMOL/L — SIGNIFICANT CHANGE UP (ref 7–14)
BASOPHILS # BLD AUTO: 0.05 K/UL — SIGNIFICANT CHANGE UP (ref 0–0.2)
BASOPHILS NFR BLD AUTO: 0.7 % — SIGNIFICANT CHANGE UP (ref 0–1)
BUN SERPL-MCNC: 12 MG/DL — SIGNIFICANT CHANGE UP (ref 10–20)
CALCIUM SERPL-MCNC: 8.7 MG/DL — SIGNIFICANT CHANGE UP (ref 8.4–10.5)
CHLORIDE SERPL-SCNC: 100 MMOL/L — SIGNIFICANT CHANGE UP (ref 98–110)
CO2 SERPL-SCNC: 29 MMOL/L — SIGNIFICANT CHANGE UP (ref 17–32)
CREAT SERPL-MCNC: <0.5 MG/DL — LOW (ref 0.7–1.5)
EGFR: 115 ML/MIN/1.73M2 — SIGNIFICANT CHANGE UP
EOSINOPHIL # BLD AUTO: 0.35 K/UL — SIGNIFICANT CHANGE UP (ref 0–0.7)
EOSINOPHIL NFR BLD AUTO: 5.1 % — SIGNIFICANT CHANGE UP (ref 0–8)
GLUCOSE SERPL-MCNC: 130 MG/DL — HIGH (ref 70–99)
HCT VFR BLD CALC: 29.5 % — LOW (ref 37–47)
HGB BLD-MCNC: 9.2 G/DL — LOW (ref 12–16)
IMM GRANULOCYTES NFR BLD AUTO: 0.9 % — HIGH (ref 0.1–0.3)
LYMPHOCYTES # BLD AUTO: 1.75 K/UL — SIGNIFICANT CHANGE UP (ref 1.2–3.4)
LYMPHOCYTES # BLD AUTO: 25.7 % — SIGNIFICANT CHANGE UP (ref 20.5–51.1)
MCHC RBC-ENTMCNC: 29.5 PG — SIGNIFICANT CHANGE UP (ref 27–31)
MCHC RBC-ENTMCNC: 31.2 G/DL — LOW (ref 32–37)
MCV RBC AUTO: 94.6 FL — SIGNIFICANT CHANGE UP (ref 81–99)
MONOCYTES # BLD AUTO: 0.57 K/UL — SIGNIFICANT CHANGE UP (ref 0.1–0.6)
MONOCYTES NFR BLD AUTO: 8.4 % — SIGNIFICANT CHANGE UP (ref 1.7–9.3)
NEUTROPHILS # BLD AUTO: 4.04 K/UL — SIGNIFICANT CHANGE UP (ref 1.4–6.5)
NEUTROPHILS NFR BLD AUTO: 59.2 % — SIGNIFICANT CHANGE UP (ref 42.2–75.2)
NRBC # BLD: 0 /100 WBCS — SIGNIFICANT CHANGE UP (ref 0–0)
PLATELET # BLD AUTO: 363 K/UL — SIGNIFICANT CHANGE UP (ref 130–400)
PMV BLD: 10.5 FL — HIGH (ref 7.4–10.4)
POTASSIUM SERPL-MCNC: 4.7 MMOL/L — SIGNIFICANT CHANGE UP (ref 3.5–5)
POTASSIUM SERPL-SCNC: 4.7 MMOL/L — SIGNIFICANT CHANGE UP (ref 3.5–5)
RBC # BLD: 3.12 M/UL — LOW (ref 4.2–5.4)
RBC # FLD: 20 % — HIGH (ref 11.5–14.5)
SODIUM SERPL-SCNC: 140 MMOL/L — SIGNIFICANT CHANGE UP (ref 135–146)
WBC # BLD: 6.82 K/UL — SIGNIFICANT CHANGE UP (ref 4.8–10.8)
WBC # FLD AUTO: 6.82 K/UL — SIGNIFICANT CHANGE UP (ref 4.8–10.8)

## 2024-07-03 PROCEDURE — 99232 SBSQ HOSP IP/OBS MODERATE 35: CPT

## 2024-07-03 RX ORDER — SODIUM CHLORIDE 0.9 % (FLUSH) 0.9 %
1000 SYRINGE (ML) INJECTION
Refills: 0 | Status: DISCONTINUED | OUTPATIENT
Start: 2024-07-03 | End: 2024-07-04

## 2024-07-03 RX ADMIN — LEVETIRACETAM 750 MILLIGRAM(S): 100 INJECTION INTRAVENOUS at 17:49

## 2024-07-03 RX ADMIN — IPRATROPIUM BROMIDE AND ALBUTEROL SULFATE 3 MILLILITER(S): .5; 3 SOLUTION RESPIRATORY (INHALATION) at 19:46

## 2024-07-03 RX ADMIN — MIDODRINE HYDROCHLORIDE 10 MILLIGRAM(S): 10 TABLET ORAL at 06:25

## 2024-07-03 RX ADMIN — LINEZOLID 300 MILLIGRAM(S): 600 TABLET, FILM COATED ORAL at 06:53

## 2024-07-03 RX ADMIN — RALOXIFENE HYDROCHLORIDE 60 MILLIGRAM(S): 60 TABLET, FILM COATED ORAL at 11:49

## 2024-07-03 RX ADMIN — LEVETIRACETAM 750 MILLIGRAM(S): 100 INJECTION INTRAVENOUS at 06:25

## 2024-07-03 RX ADMIN — MEROPENEM 100 MILLIGRAM(S): 500 INJECTION, POWDER, FOR SOLUTION INTRAVENOUS at 13:51

## 2024-07-03 RX ADMIN — ENOXAPARIN SODIUM 30 MILLIGRAM(S): 100 INJECTION SUBCUTANEOUS at 11:50

## 2024-07-03 RX ADMIN — Medication 1 APPLICATION(S): at 06:16

## 2024-07-03 RX ADMIN — Medication 1 TABLET(S): at 11:49

## 2024-07-03 RX ADMIN — Medication 4 MILLILITER(S): at 19:46

## 2024-07-03 RX ADMIN — MEROPENEM 100 MILLIGRAM(S): 500 INJECTION, POWDER, FOR SOLUTION INTRAVENOUS at 06:25

## 2024-07-03 RX ADMIN — Medication 3 MILLIGRAM(S): at 21:52

## 2024-07-03 RX ADMIN — MIDODRINE HYDROCHLORIDE 10 MILLIGRAM(S): 10 TABLET ORAL at 21:52

## 2024-07-03 RX ADMIN — Medication 1 APPLICATION(S): at 17:50

## 2024-07-03 RX ADMIN — Medication 50 MILLILITER(S): at 17:49

## 2024-07-03 RX ADMIN — Medication 300 MILLIGRAM(S): at 06:25

## 2024-07-03 RX ADMIN — MEROPENEM 100 MILLIGRAM(S): 500 INJECTION, POWDER, FOR SOLUTION INTRAVENOUS at 21:52

## 2024-07-03 RX ADMIN — Medication 300 MILLIGRAM(S): at 17:49

## 2024-07-03 RX ADMIN — PANTOPRAZOLE SODIUM 40 MILLIGRAM(S): 40 INJECTION, POWDER, FOR SOLUTION INTRAVENOUS at 11:49

## 2024-07-03 RX ADMIN — MIDODRINE HYDROCHLORIDE 10 MILLIGRAM(S): 10 TABLET ORAL at 13:51

## 2024-07-03 RX ADMIN — Medication 4 MILLILITER(S): at 08:07

## 2024-07-03 RX ADMIN — IPRATROPIUM BROMIDE AND ALBUTEROL SULFATE 3 MILLILITER(S): .5; 3 SOLUTION RESPIRATORY (INHALATION) at 14:04

## 2024-07-03 RX ADMIN — LINEZOLID 300 MILLIGRAM(S): 600 TABLET, FILM COATED ORAL at 17:50

## 2024-07-03 RX ADMIN — IPRATROPIUM BROMIDE AND ALBUTEROL SULFATE 3 MILLILITER(S): .5; 3 SOLUTION RESPIRATORY (INHALATION) at 08:07

## 2024-07-03 NOTE — PROGRESS NOTE ADULT - ASSESSMENT
58F with PMH including CP, anemia, Down's syndrome, seizures, OP, hypotension, nonverbal at at baseline, here with respiratory distress, and found to have acute hypoxic respiratory failure from PNA, possibly from aspiration, admitted to medicine stepdown on pressors and IV abx.    Acute Hypoxemic Respiratory Failure due to suspected Aspiration PNA, recurrent  Septic shock (POA)  Hx Bacteremia with ESBL Proteus and VR E fecalis and fecium.  Hx of Cerebral Palsy/ Down syndrome/ Nonverbal at baseline   Elevated lactate  -  CTA chest:  a) reported Diffuse, near complete left lower lobe and upper lobe mixed consolidation and atelectasis, with some remaining aeration to left upper lobe.                        b) Occlusion of right mainstem bronchus; correlation for aspiration suggested.                        c) Near complete resolution of right lung airspace opacification, with  few residual likely inflammatory/infectious ground-glass airspace opacities.                        d) No evidence of acute pulmonary embolism.  - on nc   - midodrine 10mg q8hrs  - so far, Ucx and blood cx NGTD, procal 0.6  - appreciate ID eval, c.w meropenem and Zyvox, dc doxycycline--plan for 10 days today of antibiotics - last day of antibiotics 7/5  - Chest PT via vest, nebs, daily chest xrays   - strict aspiration precautions, resume PEG feeds   - palliative team following, patient is currently DNR/DNI  - GI consult appreciated - GJ tube was not recommended for a way to prevent aspiration     Seizure - c/w Keppra     Hx of duodenal perforation - Cont PPI    Foot pressure ulcers - wound care appreciated, burn consult pending      OVerall prognosis is poor, high risk of decompensation  Pending: monitor BP, c/w IV abx, burn consult , oxygen weaning    Dispo: Group Home         58F with PMH including CP, anemia, Down's syndrome, seizures, OP, hypotension, nonverbal at at baseline, here with respiratory distress, and found to have acute hypoxic respiratory failure from PNA, possibly from aspiration, admitted to medicine stepdown on pressors and IV abx.    Acute Hypoxemic Respiratory Failure due to suspected Aspiration PNA, recurrent  Septic shock (POA)  Hx Bacteremia with ESBL Proteus and VR E fecalis and fecium.  Hx of Cerebral Palsy/ Down syndrome/ Nonverbal at baseline   Elevated lactate  -  CTA chest:  a) reported Diffuse, near complete left lower lobe and upper lobe mixed consolidation and atelectasis, with some remaining aeration to left upper lobe.                        b) Occlusion of right mainstem bronchus; correlation for aspiration suggested.                        c) Near complete resolution of right lung airspace opacification, with  few residual likely inflammatory/infectious ground-glass airspace opacities.                        d) No evidence of acute pulmonary embolism.  - on nc   - midodrine 10mg q8hrs  - so far, Ucx and blood cx NGTD, procal 0.6  - appreciate ID eval, c.w meropenem and Zyvox, dc doxycycline--plan for 10 days today of antibiotics - last day of antibiotics 7/5  - Chest PT via vest, nebs, daily chest xrays   - strict aspiration precautions, resume PEG feeds   - palliative team following, patient is currently DNR/DNI  - GI consult appreciated - GJ tube was not recommended for a way to prevent aspiration     Seizure - c/w Keppra     Hx of duodenal perforation - Cont PPI    Foot pressure ulcers - wound care appreciated, burn consult pending      #intermittent tachycardia  prior EKG showed sinus tachycardia  dehydration  will start on gentle hydration for 24 hours and will reassess     OVerall prognosis is poor, high risk of decompensation  Pending: monitor BP, c/w IV abx, burn consult , oxygen weaning  . started on gebntle hydration for 24 hours. reassess tomorrow. monitor HR   Dispo: Group Home

## 2024-07-03 NOTE — CONSULT NOTE ADULT - CONSULT REASON
AHRF
Pt with frequent recurrent aspiration PNA wuth PEG tube.  Would pt have benefit from G-J tube placement?
Right Heel Ulcer
Sepsis
GOC

## 2024-07-03 NOTE — PROGRESS NOTE ADULT - ASSESSMENT
58F with PMH including CP, anemia, Down's syndrome, seizures, OP, hypotension, nonverbal at at baseline, here with respiratory distress, and found to have acute hypoxic respiratory failure from PNA, possibly from aspiration, admitted to medicine stepdown on pressors and IV abx.    #Acute Hypoxemic Respiratory Failure due to suspected Aspiration PNA, recurrent  #Septic shock (POA)  #Hx Bacteremia with ESBL Proteus and VR E fecalis and fecium.  #Hx of Cerebral Palsy/ Down syndrome/ Nonverbal at baseline   - Last Lactate 1.5 (29/6) down from 2.7, 3.9, 5.4.   - CTA chest (6/27/24):  a) reported Diffuse, near complete left lower lobe and upper lobe mixed consolidation and atelectasis, with some remaining aeration to left upper lobe.                        b) Occlusion of right mainstem bronchus; correlation for aspiration suggested.                        c) Near complete resolution of right lung airspace opacification, with  few residual likely inflammatory/infectious ground-glass airspace opacities.                        d) No evidence of acute pulmonary embolism.  - Weaned down from venturi mask (7/1/24), currently on high flow nasal cannula 4L (7/3/24).  - Midodrine 10mg q8hrs  - so far, Ucx and blood cx NGTD, procal 0.6  - appreciate ID eval, cw meropenem and Zyvox (switched to PO, per ID recommendations 7/1/24), d/c doxycycline. Last dose on 7/5/24  - Chest PT via vest, nebs, daily chest xrays.  - strict aspiration precautions, resume PEG feeds   - palliative team following: patient is already DNR/DNI, MOLST checklist completed on prior admission with [facility] and [facility]  - GI consulted to consider G_Jtube, they do not recommend it at this time as no benefit with aspiration.   - Pt need consent from CAB for PEJ. Following up with , once consent is received reach out to GI so they can plan the procedure.     #Seizure   - c/w Keppra     #Hx of duodenal perforation  - Continue PPI     #Pressure ulcers, bilaterally  - Dressing changed this AM 7/3/24  - Wound care consulted, appreciate recs  - Burn consult pending    Overall prognosis is poor, high risk of decompensation  Pending: monitor BP, c/w IV abx, resume tube feeds, GI consult, oxygen weaning

## 2024-07-03 NOTE — CONSULT NOTE ADULT - SUBJECTIVE AND OBJECTIVE BOX
Podiatry Consult Note    Subjective:  JERICHO TEA  Seen Bedside 58y Female  .   Patient is a 58y old  Female who presents with a chief complaint of Sepsis (03 Jul 2024 13:19)    HPI:  59 yo F with hx of down syndrome, anemia, cerebral palsy, seizure disorder, osteoporosis, hypotension on midodrine, nonverbal at baseline who was BIBEMS from group home for respiratory distress with complaints of fever and hypoxia. Per EMS, pt was hypoxic to 80s and was put on non-rebreather mask. Pt was admitted recently from 5/31 - 6/13 for septic shock and acute hypoxic and hypercapnic respiratory failure 2/2 recurrent CAP. On arrival patient was tachycardic to 137. was febrile to 102.4 F and was tachypneic to 30s. The patient was put on AVAPS with patient saturating 92 %, was given STAT dose of cefepime and vancomycin, got 1250 cc of bolus (30 cc/kg per sepsis protocol).  ABG was done which showed pH of 7.4 with pO2 of 81 and Pco2 of 42 with lactate of 2.8.     ED Course  Vitals: 102.4 F, RR 30 bpm, , 107/67, 93 % on AVAPS  Labs: WBC 21K, , Lactate 2.4, UA Turbid with Large LE, 64 WBC and Yeast like cells +nt  Imaging: CXR showing LLL infiltrate  Medications: Cefepime, Vancomycin and 1.2 L bolus    Patient being admitted to medicine for further evaluation and managment of severe sepsis.  (26 Jun 2024 23:54)      Past Medical History and Surgical History  PAST MEDICAL & SURGICAL HISTORY:  Down syndrome      Osteoporosis      Mild anemia      Neuropathy      Cerebral palsy      Seizure      Nonverbal      Hypotension  on midodrine      S/P debridement  of R hip on 3/2/21      S/P percutaneous endoscopic gastrostomy (PEG) tube placement           Review of Systems:  [X] Ten point review of systems is otherwise negative except as noted     Objective:  Vital Signs Last 24 Hrs  T(C): 36.8 (03 Jul 2024 11:46), Max: 37 (03 Jul 2024 05:43)  T(F): 98.3 (03 Jul 2024 11:46), Max: 98.6 (03 Jul 2024 05:43)  HR: 134 (03 Jul 2024 11:46) (100 - 134)  BP: 115/67 (03 Jul 2024 11:46) (110/60 - 115/67)  BP(mean): --  RR: 18 (03 Jul 2024 11:46) (18 - 18)  SpO2: 98% (03 Jul 2024 05:43) (97% - 98%)    Parameters below as of 02 Jul 2024 20:15  Patient On (Oxygen Delivery Method): nasal cannula                            9.2    6.82  )-----------( 363      ( 03 Jul 2024 07:08 )             29.5                 07-03    140  |  100  |  12  ----------------------------<  130<H>  4.7   |  29  |  <0.5<L>    Ca    8.7      03 Jul 2024 07:08        Physical Exam - Lower Extremity Focused:   Derm: Ulcer detected at Right Heel, Plantar midfoot, and Left 1st MPTJ, and Heel  Vascular: DP and PT Pulses Diminished; Foot is Warm to Warm to the touch; Capillary Refill Time < 3 Seconds;    Neuro: Protective Sensation Diminished / Moderately Neuropathic   MSK: Pain On Palpation at Wound Site     Assessment:  B/L Ulcers    Plan:  Chart reviewed and Patient evaluated. All Questions and Concerns Addressed and Answered  Local Wound Care; Wound Packed w/ Xeroform/ Gauze/ Abdominal Pad/ DSD / Kerlix   Weight Bearing Status; NWB  Discussed Plan w/ Dr. Everett    Podiatry 
TEA ECHAVARRIA  58y, Female  Allergy: chlorhexidine containing compounds (Rash (Mild to Mod))      CHIEF COMPLAINT: Sepsis (28 Jun 2024 15:22)      HPI:  59 yo F with hx of down syndrome, anemia, cerebral palsy, seizure disorder, osteoporosis, hypotension on midodrine, nonverbal at baseline who was BIBEMS from group home for respiratory distress with complaints of fever and hypoxia. Per EMS, pt was hypoxic to 80s and was put on non-rebreather mask. Pt was admitted recently from 5/31 - 6/13 for septic shock and acute hypoxic and hypercapnic respiratory failure 2/2 recurrent CAP. On arrival patient was tachycardic to 137. was febrile to 102.4 F and was tachypneic to 30s. The patient was put on AVAPS with patient saturating 92 %, was given STAT dose of cefepime and vancomycin, got 1250 cc of bolus (30 cc/kg per sepsis protocol).  ABG was done which showed pH of 7.4 with pO2 of 81 and Pco2 of 42 with lactate of 2.8.     ED Course  Vitals: 102.4 F, RR 30 bpm, , 107/67, 93 % on AVAPS  Labs: WBC 21K, , Lactate 2.4, UA Turbid with Large LE, 64 WBC and Yeast like cells +nt  Imaging: CXR showing LLL infiltrate  Medications: Cefepime, Vancomycin and 1.2 L bolus    Patient being admitted to medicine for further evaluation and managment of severe sepsis.  (26 Jun 2024 23:54)      INFECTIOUS DISEASE HISTORY:  History as above.      FAMILY HISTORY  No pertinent family history in first degree relatives    No pertinent family history in first degree relatives        SOCIAL HISTORY  Social History:  Lives at nursing home (14 Oct 2023 20:33)        ROS  unable to obtain history secondary to patient's mental status and/or sedation      VITALS:  T(F): 100.1, Max: 101.8 (06-28-24 @ 09:56)  HR: 120  BP: 122/53  RR: 18Vital Signs Last 24 Hrs  T(C): 37.8 (28 Jun 2024 16:28), Max: 38.8 (28 Jun 2024 09:56)  T(F): 100.1 (28 Jun 2024 16:28), Max: 101.8 (28 Jun 2024 09:56)  HR: 120 (28 Jun 2024 15:49) (61 - 131)  BP: 122/53 (28 Jun 2024 15:49) (79/51 - 136/78)  BP(mean): 76 (27 Jun 2024 18:25) (66 - 76)  RR: 18 (28 Jun 2024 15:49) (18 - 19)  SpO2: 95% (28 Jun 2024 15:49) (95% - 100%)    Parameters below as of 28 Jun 2024 15:49  Patient On (Oxygen Delivery Method): mask, Venturi        PHYSICAL EXAM:  Gen: NAD, resting in bed, thin, frail   HEENT: Normocephalic, atraumatic  Neck: supple, no lymphadenopathy  CV: Regular rate & regular rhythm  Lungs: decreased BS at bases, no fremitus  Abdomen: Soft, BS present  Ext: Warm, well perfused  Neuro: non focal, awake  Skin: no rash, no erythema  Lines: no phlebitis    TESTS & MEASUREMENTS:      RADIOLOGY & ADDITIONAL TESTS:  I have personally reviewed the last Chest xray  CXR  Xray Chest 1 View- PORTABLE-Urgent:   ACC: [id number] EXAM:  XR CHEST PORTABLE URGENT 1V   ORDERED BY: CELESTE ESTRADA     PROCEDURE DATE:  06/26/2024          INTERPRETATION:  Clinical History / Reason for exam: Shortness of breath    Comparison : Chest radiograph 6/12/2024.    Technique/Positioning: Single frontal chest x-ray obtained.    Findings:    Support devices: None.    Cardiac/mediastinum/hilum: Unchanged.    Lung parenchyma/Pleura: Bilateral parenchymal opacities better   appreciated on subsequent CT    Skeleton/soft tissues: Unchanged. Hiatal hernia.    Impression:  Bilateral parenchymal opacities better appreciated on subsequent CT.  Hiatal hernia.    --- End of Report ---            SHYANN GREENE MD; Attending Radiologist  This document has been electronically signed. Jun 27 2024  7:07AM (06-26-24 @ 19:47)      CT  CT Angio Chest PE Protocol w/ IV Cont:   ACC: [id number] EXAM:  CT ANGIO CHEST PULM ART WAWIC   ORDERED BY: DAMARIS HUMPHREY     PROCEDURE DATE:  06/27/2024          INTERPRETATION:  CLINICAL HISTORY/REASON FOR EXAM: Shortness of breath.    TECHNIQUE: Multislice helical sections were obtainedfrom the thoracic   inlet to the lung bases during rapid administration of 65 cc Omnipaque   350 intravenous contrast. Thin sections were reconstructed through the   pulmonary vasculature. 3D (MIP) reformats obtained. 35 cc contrast   discarded    COMPARISON: CT chest 5/6/2024, 4/24/2024.    FINDINGS:    PULMONARY EMBOLUS: No evidence of acute pulmonary embolism.    LUNGS, PLEURA, AIRWAYS: Diffuse, near complete left lower lobe and upper   lobe mixed consolidation and atelectasis, with some remaining aeration to   left upper lobe. Occlusion of right mainstem bronchus; correlation for   aspiration suggested. Left thoracic volume loss due to atelectasis.    Near complete resolution of right lung airspace opacification, with few   residual likely inflammatory/infectious groundglass airspace opacities.   Right basilar subsegmental atelectasis.    Trace left pleural effusion. No pneumothorax.    THORACIC NODES: Prominent likely reactive mediastinal lymph nodes    MEDIASTINUM/GREAT VESSELS: No pericardial effusion. Heart size is within   normal limits. The aorta and main pulmonary artery are of normal caliber.   Moderate hiatal hernia.    BONES/SOFT TISSUES: Unremarkable.    VISUALIZED UPPER ABDOMEN: Unremarkable.      IMPRESSION:      1. Diffuse, near complete left lower lobe and upper lobe mixed   consolidation and atelectasis, with some remaining aeration to left upper   lobe. Occlusion of right mainstem bronchus; correlation for aspiration   suggested.    2. Near complete resolutionof right lung airspace opacification, with   few residual likely inflammatory/infectious groundglass airspace   opacities.    3. No evidence of acute pulmonary embolism.    --- End of Report ---            SHYANN GREENE MD; Attending Radiologist  This document has been electronically signed. Jun 27 2024  3:29AM (06-27-24 @ 03:11)        MEDICATIONS  albuterol/ipratropium for Nebulization 3 Nebulizer every 6 hours  calcium carbonate   1250 mG (OsCal) 1 Oral daily  doxycycline IVPB 100 IV Intermittent every 12 hours  enoxaparin Injectable 30 SubCutaneous every 24 hours  gabapentin 300 Oral two times a day  hydrocortisone sodium succinate Injectable 50 IV Push every 6 hours  levETIRAcetam 750 Oral two times a day  linezolid  IVPB     linezolid  IVPB 600 IV Intermittent every 12 hours  melatonin 3 Oral at bedtime  meropenem  IVPB     meropenem  IVPB 1000 IV Intermittent every 8 hours  midodrine 10 Oral every 8 hours  norepinephrine Infusion 0.05 IV Continuous <Continuous>  pantoprazole   Suspension 40 Enteral Tube daily  raloxifene 60 Oral daily  senna 2 Oral at bedtime      Weight  Weight (kg): 41 (06-26-24 @ 19:01)    ANTIBIOTICS:  doxycycline IVPB 100 milliGRAM(s) IV Intermittent every 12 hours  linezolid  IVPB 600 milliGRAM(s) IV Intermittent every 12 hours  linezolid  IVPB      meropenem  IVPB 1000 milliGRAM(s) IV Intermittent every 8 hours  meropenem  IVPB          ALLERGIES:  chlorhexidine containing compounds (Rash (Mild to Mod))        
Gastroenterology Consultation  Patient is a 58y old  Female who presents with a chief complaint of Sepsis (30 Jun 2024 13:13)  Admitted on: 06-26-24    HPI:  59 yo F with hx of down syndrome, anemia, cerebral palsy, seizure disorder, osteoporosis, hypotension on midodrine, nonverbal at baseline who was BIBEMS from group home for respiratory distress with complaints of fever and hypoxia likely 2/2 to bilateral basilar pneumonia possibly from aspiration. On arrival patient was tachycardic to 137. Was febrile to 102.4 F and was tachypneic to 30s. The patient is on AVAPS with trial of HFNC. Admitted to medicine stepdown on pressors and IV abx. Patient has had frequent admissions for similar issues (this is her 9th admission in 12 months). She resides at a group home, and is DNR/DNI. Patient was in the CEU and downgraded to floors.  GI consulted for frequent recurrent aspiration PNA wuth PEG tube, for recs if pt would benefit from G-J tube placement?      ED Course  Vitals: 102.4 F, RR 30 bpm, , 107/67, 93 % on AVAPS  Labs: WBC 21K, , Lactate 2.4, UA Turbid with Large LE, 64 WBC and Yeast like cells +nt  Imaging: CXR showing LLL infiltrate  Medications: Cefepime, Vancomycin and 1.2 L bolus    Patient being admitted to medicine for further evaluation and management of severe sepsis.  (26 Jun 2024 23:54)        Prior EGD:  - EGD w/ PEG Placement (04.02.24 @ 08:30)   Impressions:  Normal mucosa in the whole esophagus.  Normal mucosa in the whole stomach. Gastrostomy placed.   Normal mucosa in the whole examined duodenum.    Prior Colonoscopy:      PAST MEDICAL & SURGICAL HISTORY:  Down syndrome  Osteoporosis  Mild anemia  Neuropathy  Cerebral palsy  Seizure  Nonverbal  Hypotension on midodrine  S/P debridement of R hip on 3/2/21  S/P percutaneous endoscopic gastrostomy (PEG) tube placement            FAMILY HISTORY:      Social History:  Tobacco:  Alcohol:  Drugs:    Home Medications:  ipratropium-albuterol 0.5 mg-2.5 mg/3 mL inhalation solution: 3 milliliter(s) inhaled every 6 hours as needed for  shortness of breath and/or wheezing (31 May 2024 23:35)  Melatonin 3 mg oral tablet: 1 tab(s) by PEG tube once a day (at bedtime) (31 May 2024 23:35)  petrolatum topical ointment: 1 Apply topically to affected area 2 times a day (31 May 2024 23:35)  raloxifene 60 mg oral tablet: 1 tab(s) by PEG tube once a day (31 May 2024 23:35)  senna leaf extract oral tablet: 2 tab(s) orally once a day (at bedtime) (31 May 2024 23:35)        MEDICATIONS  (STANDING):  albuterol/ipratropium for Nebulization 3 milliLiter(s) Nebulizer every 6 hours  calcium carbonate   1250 mG (OsCal) 1 Tablet(s) Oral daily  enoxaparin Injectable 30 milliGRAM(s) SubCutaneous every 24 hours  gabapentin 300 milliGRAM(s) Oral two times a day  levETIRAcetam 750 milliGRAM(s) Oral two times a day  linezolid  IVPB 600 milliGRAM(s) IV Intermittent every 12 hours  linezolid  IVPB      melatonin 3 milliGRAM(s) Oral at bedtime  meropenem  IVPB      meropenem  IVPB 1000 milliGRAM(s) IV Intermittent every 8 hours  midodrine 10 milliGRAM(s) Oral every 8 hours  pantoprazole   Suspension 40 milliGRAM(s) Enteral Tube daily  raloxifene 60 milliGRAM(s) Oral daily    MEDICATIONS  (PRN):  acetaminophen     Tablet .. 650 milliGRAM(s) Oral every 6 hours PRN Temp greater or equal to 38C (100.4F), Mild Pain (1 - 3)  aluminum hydroxide/magnesium hydroxide/simethicone Suspension 30 milliLiter(s) Oral every 4 hours PRN Dyspepsia  ondansetron Injectable 4 milliGRAM(s) IV Push every 8 hours PRN Nausea and/or Vomiting      Allergies  chlorhexidine containing compounds (Rash (Mild to Mod))      Review of Systems:   Constitutional:  No Fever, No Chills  ENT/Mouth:  No Hearing Changes,  No Difficulty Swallowing  Eyes:  No Eye Pain, No Vision Changes  Cardiovascular:  No Chest Pain, No Palpitations  Respiratory:  No Cough, No Dyspnea  Gastrointestinal:  PEG tube          Physical Examination:  T(C): 36.2 (07-01-24 @ 05:53), Max: 36.8 (06-30-24 @ 20:55)  HR: 89 (07-01-24 @ 05:53) (89 - 110)  BP: 109/60 (07-01-24 @ 05:53) (101/61 - 109/60)  RR: 18 (07-01-24 @ 05:53) (18 - 18)  SpO2: 97% (07-01-24 @ 05:53) (97% - 97%)      06-29-24 @ 07:01  -  06-30-24 @ 07:00  --------------------------------------------------------  IN: 0 mL / OUT: 450 mL / NET: -450 mL    06-30-24 @ 07:01  -  07-01-24 @ 07:00  --------------------------------------------------------  IN: 0 mL / OUT: 900 mL / NET: -900 mL          GENERAL: non verbal at baseline  HEAD:  Atraumatic, Normocephalic  EYES: conjunctiva and sclera clear  CHEST/LUNG: decreased bilateral breath sounds  HEART: Regular rate and rhythm; normal S1, S2, No murmurs.  ABDOMEN: Soft, nondistended, G-tube in place   SKIN: Intact, no jaundice        Data:                        8.9    10.21 )-----------( 384      ( 01 Jul 2024 06:29 )             28.0     Hgb Trend:  8.9  07-01-24 @ 06:29  9.5  06-30-24 @ 07:16  9.6  06-29-24 @ 05:42  9.4  06-28-24 @ 11:00        07-01    143  |  102  |  13  ----------------------------<  175<H>  4.4   |  32  |  0.5<L>    Ca    8.5      01 Jul 2024 06:29  Mg     2.3     07-01    TPro  6.0  /  Alb  2.8<L>  /  TBili  0.3  /  DBili  x   /  AST  11  /  ALT  9   /  AlkPhos  84  06-30    Liver panel trend:  TBili 0.3   /   AST 11   /   ALT 9   /   AlkP 84   /   Tptn 6.0   /   Alb 2.8    /   DBili --      06-30  TBili 0.4   /   AST 15   /   ALT 15   /   AlkP 96   /   Tptn 6.1   /   Alb 2.9    /   DBili --      06-28  TBili 0.4   /   AST 36   /   ALT 34   /   AlkP 130   /   Tptn 7.9   /   Alb 3.6    /   DBili --      06-26          Culture - Blood (collected 29 Jun 2024 17:33)  Source: .Blood None  Preliminary Report (01 Jul 2024 02:02):    No growth at 24 hours          Radiology:      
Patient is a 58y old  Female who presents with a chief complaint of SOB (26 Jun 2024 23:54)    57 yo F with hx of down syndrome, anemia, cerebral palsy, seizure disorder, osteoporosis, hypotension on midodrine, nonverbal at baseline who was BIBEMS from group home for respiratory distress with complaints of fever and hypoxia. Per EMS, pt was hypoxic to 80s and was put on non-rebreather mask. Pt was admitted recently from 5/31 - 6/13 for septic shock and acute hypoxic and hypercapnic respiratory failure 2/2 recurrent CAP. On arrival patient was tachycardic to 137. was febrile to 102.4 F and was tachypneic to 30s. The patient was put on AVAPS with patient saturating 92 %, was given STAT dose of cefepime and vancomycin, got 1250 cc of bolus (30 cc/kg per sepsis protocol).  ABG was done which showed pH of 7.4 with pO2 of 81 and Pco2 of 42 with lactate of 2.8.     ED Course  Vitals: 102.4 F, RR 30 bpm, , 107/67, 93 % on AVAPS  Labs: WBC 21K, , Lactate 2.4, UA Turbid with Large LE, 64 WBC and Yeast like cells +nt  Imaging: CXR showing LLL infiltrate  Medications: Cefepime, Vancomycin and 1.2 L bolus    Patient being admitted to medicine for further evaluation and managment of severe sepsis.  (26 Jun 2024 23:54) called to evaluate, this am remains on AVAPS      PAST MEDICAL & SURGICAL HISTORY:  Down syndrome      Osteoporosis      Mild anemia      Neuropathy      Cerebral palsy      Seizure      Nonverbal      Hypotension  on midodrine      S/P debridement  of R hip on 3/2/21      S/P percutaneous endoscopic gastrostomy (PEG) tube placement          SOCIAL HX:   Smoking -    FAMILY HISTORY:      REVIEW OF SYSTEMS SEE HPI    Allergies    chlorhexidine containing compounds (Rash (Mild to Mod))    Intolerances        acetaminophen     Tablet .. 650 milliGRAM(s) Oral every 6 hours PRN  albuterol/ipratropium for Nebulization 3 milliLiter(s) Nebulizer every 6 hours  aluminum hydroxide/magnesium hydroxide/simethicone Suspension 30 milliLiter(s) Oral every 4 hours PRN  calcium carbonate   1250 mG (OsCal) 1 Tablet(s) Oral daily  doxycycline IVPB 100 milliGRAM(s) IV Intermittent every 12 hours  enoxaparin Injectable 30 milliGRAM(s) SubCutaneous every 24 hours  gabapentin 300 milliGRAM(s) Oral two times a day  levETIRAcetam 750 milliGRAM(s) Oral two times a day  melatonin 3 milliGRAM(s) Oral at bedtime  midodrine 10 milliGRAM(s) Oral every 8 hours  norepinephrine Infusion 0.05 MICROgram(s)/kG/Min IV Continuous <Continuous>  ondansetron Injectable 4 milliGRAM(s) IV Push every 8 hours PRN  pantoprazole   Suspension 40 milliGRAM(s) Enteral Tube daily  piperacillin/tazobactam IVPB.- 3.375 Gram(s) IV Intermittent once  piperacillin/tazobactam IVPB.. 3.375 Gram(s) IV Intermittent every 8 hours  raloxifene 60 milliGRAM(s) Oral daily  senna 2 Tablet(s) Oral at bedtime  : Home Meds:      PHYSICAL EXAM    ICU Vital Signs Last 24 Hrs  T(C): 37.8 (27 Jun 2024 05:00), Max: 39.1 (26 Jun 2024 19:00)  T(F): 100 (27 Jun 2024 05:00), Max: 102.4 (26 Jun 2024 19:00)  HR: 106 (27 Jun 2024 05:00) (104 - 137)  BP: 94/53 (27 Jun 2024 05:00) (72/65 - 133/87)  BP(mean): 69 (27 Jun 2024 05:00) (52 - 84)  RR: 20 (27 Jun 2024 05:00) (20 - 30)  SpO2: 99% (27 Jun 2024 05:00) (91% - 100%)    O2 Parameters below as of 27 Jun 2024 05:00  Patient On (Oxygen Delivery Method): BiPAP/CPAP            General: ILL looking  tachypneic  Lungs: dec bs l side  Cardiovascular: tachycardic  Abdomen: Soft, Positive BS  Extremities: No clubbing  Skin: Warm  Neurological: Non focal         LABS:                          12.1   21.34 )-----------( 437      ( 26 Jun 2024 19:10 )             37.7                                               06-26    140  |  101  |  19  ----------------------------<  183<H>  4.4   |  26  |  0.7    Ca    8.9      26 Jun 2024 19:10    TPro  7.9  /  Alb  3.6  /  TBili  0.4  /  DBili  x   /  AST  36  /  ALT  34  /  AlkPhos  130<H>  06-26      PT/INR - ( 26 Jun 2024 19:10 )   PT: 13.20 sec;   INR: 1.16 ratio         PTT - ( 26 Jun 2024 19:10 )  PTT:26.7 sec                                       Urinalysis Basic - ( 26 Jun 2024 22:10 )    Color: Yellow / Appearance: Turbid / SG: >1.030 / pH: x  Gluc: x / Ketone: Trace mg/dL  / Bili: Negative / Urobili: 0.2 mg/dL   Blood: x / Protein: 100 mg/dL / Nitrite: Negative   Leuk Esterase: Large / RBC: 38 /HPF / WBC 64 /HPF   Sq Epi: x / Non Sq Epi: 0 /HPF / Bacteria: Occasional /HPF                                                  LIVER FUNCTIONS - ( 26 Jun 2024 19:10 )  Alb: 3.6 g/dL / Pro: 7.9 g/dL / ALK PHOS: 130 U/L / ALT: 34 U/L / AST: 36 U/L / GGT: x                                                  Urinalysis with Rflx Culture (collected 26 Jun 2024 22:10)                                                                                       ABG - ( 26 Jun 2024 20:18 )  pH, Arterial: 7.40  pH, Blood: x     /  pCO2: 42    /  pO2: 81    / HCO3: 26    / Base Excess: 1.0   /  SaO2: 97.3                  MEDICATIONS  (STANDING):  albuterol/ipratropium for Nebulization 3 milliLiter(s) Nebulizer every 6 hours  calcium carbonate   1250 mG (OsCal) 1 Tablet(s) Oral daily  doxycycline IVPB 100 milliGRAM(s) IV Intermittent every 12 hours  enoxaparin Injectable 30 milliGRAM(s) SubCutaneous every 24 hours  gabapentin 300 milliGRAM(s) Oral two times a day  levETIRAcetam 750 milliGRAM(s) Oral two times a day  melatonin 3 milliGRAM(s) Oral at bedtime  midodrine 10 milliGRAM(s) Oral every 8 hours  norepinephrine Infusion 0.05 MICROgram(s)/kG/Min (3.84 mL/Hr) IV Continuous <Continuous>  pantoprazole   Suspension 40 milliGRAM(s) Enteral Tube daily  piperacillin/tazobactam IVPB.- 3.375 Gram(s) IV Intermittent once  piperacillin/tazobactam IVPB.. 3.375 Gram(s) IV Intermittent every 8 hours  raloxifene 60 milliGRAM(s) Oral daily  senna 2 Tablet(s) Oral at bedtime    MEDICATIONS  (PRN):  acetaminophen     Tablet .. 650 milliGRAM(s) Oral every 6 hours PRN Temp greater or equal to 38C (100.4F), Mild Pain (1 - 3)  aluminum hydroxide/magnesium hydroxide/simethicone Suspension 30 milliLiter(s) Oral every 4 hours PRN Dyspepsia  ondansetron Injectable 4 milliGRAM(s) IV Push every 8 hours PRN Nausea and/or Vomiting      cxr/ ct chest noted  
HPI: 58F with PMH including CP, anemia, Down's syndrome, seizures, OP, hypotension, nonverbal at abseline, here with respiratory distress, and found to have acute hypoxic respiratory failure from PNA, possibly from aspiration, admitted to medicine stepdown on pressors and IV abx. Patient has had frequent admissions for similar issues (this is her 9th admission in 12 months). She resides at a group home, and is DNR/DNI. Palliative consulted for [facility].    PERTINENT PM/SXH:   Down syndrome    Osteoporosis    Mild anemia    Neuropathy    Cerebral palsy    Seizure    Nonverbal    Hypotension      No significant past surgical history    S/P debridement    S/P percutaneous endoscopic gastrostomy (PEG) tube placement      FAMILY HISTORY:  no pertinent family history      SOCIAL HISTORY:   Significant other/partner[ ]  Children[ ]  Mu-ism/Spirituality:  Substance hx:  [ ]   Tobacco hx:  [ ]   Alcohol hx: [ ]   Living Situation: [ ]Home  [ ]Long term care  [ ]Rehab [X ]Other group home  Home Services: [ ] HHA [ ] Visting RN [ ] Hospice  Occupation:  Home Opioid hx:  [ ] Y [ ] N [ ] I-Stop Reference No:    ADVANCE DIRECTIVES:    [ ] Full Code [ ] DNR  MOLST with checklist  [ X]  Living Will  [ ]   DECISION MAKER(s):  [ ] Health Care Proxy(s)  [ ] Surrogate(s)  [ ] Guardian           Name(s): Phone Number(s):    BASELINE (I)ADL(s) (prior to admission):  [place]: [ ]Total  [ ] Moderate [ ]Dependent  Palliative Performance Status Version 2:         %    [email or web address]    Allergies    chlorhexidine containing compounds (Rash (Mild to Mod))    Intolerances    MEDICATIONS  (STANDING):  albuterol/ipratropium for Nebulization 3 milliLiter(s) Nebulizer every 6 hours  calcium carbonate   1250 mG (OsCal) 1 Tablet(s) Oral daily  doxycycline IVPB 100 milliGRAM(s) IV Intermittent every 12 hours  enoxaparin Injectable 30 milliGRAM(s) SubCutaneous every 24 hours  gabapentin 300 milliGRAM(s) Oral two times a day  levETIRAcetam 750 milliGRAM(s) Oral two times a day  linezolid  IVPB      melatonin 3 milliGRAM(s) Oral at bedtime  meropenem  IVPB 1000 milliGRAM(s) IV Intermittent every 8 hours  meropenem  IVPB      midodrine 10 milliGRAM(s) Oral every 8 hours  norepinephrine Infusion 0.05 MICROgram(s)/kG/Min (3.84 mL/Hr) IV Continuous <Continuous>  pantoprazole   Suspension 40 milliGRAM(s) Enteral Tube daily  raloxifene 60 milliGRAM(s) Oral daily  senna 2 Tablet(s) Oral at bedtime    MEDICATIONS  (PRN):  acetaminophen     Tablet .. 650 milliGRAM(s) Oral every 6 hours PRN Temp greater or equal to 38C (100.4F), Mild Pain (1 - 3)  aluminum hydroxide/magnesium hydroxide/simethicone Suspension 30 milliLiter(s) Oral every 4 hours PRN Dyspepsia  ondansetron Injectable 4 milliGRAM(s) IV Push every 8 hours PRN Nausea and/or Vomiting      PRESENT SYMPTOMS: [X ]Unable to obtain due to poor mentation   Source if other than patient:  [ ]Family   [ ]Team   [ ]All review of systems negative including pain and dyspnea unless noted below    All components of pain assessment below addressed. Blank spaces indicate that the patient did/could not complete the assessment.  Pain: [ ]yes [ ]no  QOL impact -   Location -                    Aggravating factors -  Quality -  Radiation -  Timing-  Severity (0-10 scale):  Minimal acceptable level (0-10 scale):     CPOT:    https://www.[facility].org/getattachment/ehl31r86-8s2l-5h4u-6e1x-7142w1825b5z/Critical-Care-Pain-Observation-Tool-(CPOT)    PAIN AD Score: 0  [email or web address].[email or web address]/cog/painad.pdf (press ctrl +  left click to view)    Dyspnea:                           [ ]None[ ]Mild [ ]Moderate [ ]Severe     Respiratory Distress Observation Scale (RDOS): 2  A score of 0 to 2 signifies little or no respiratory distress, 3 signifies mild distress, scores 4 to 6 indicate moderate distress, and scores greater than 7 signify severe distress  https://www.[facility].ca/sites/default/files/PDFS/790518-pbxobjrygbw-sdznjiia-wzdwjcprkgy-rgucc.pdf    Anxiety:                             [ ]None[ ]Mild [ ]Moderate [ ]Severe   Fatigue:                             [ ]None[ ]Mild [ ]Moderate [ ]Severe   Nausea:                             [ ]None[ ]Mild [ ]Moderate [ ]Severe   Loss of appetite:              [ ]None[ ]Mild [ ]Moderate [ ]Severe   Constipation:                    [ ]None[ ]Mild [ ]Moderate [ ]Severe    Other Symptoms:      Palliative Performance Status Version 2:         %    http://[email or web address].org/files/news/palliative_performance_scale_ppsv2.pdf  PHYSICAL EXAM:  Vital Signs Last 24 Hrs  T(C): 37.2 (27 Jun 2024 14:20), Max: 39.1 (26 Jun 2024 19:00)  T(F): 98.9 (27 Jun 2024 14:20), Max: 102.4 (26 Jun 2024 19:00)  HR: 130 (27 Jun 2024 16:22) (64 - 137)  BP: 94/50 (27 Jun 2024 16:22) (72/65 - 147/98)  BP(mean): 68 (27 Jun 2024 16:22) (52 - 112)  RR: 19 (27 Jun 2024 15:02) (18 - 30)  SpO2: 100% (27 Jun 2024 16:19) (91% - 100%)    Parameters below as of 27 Jun 2024 16:19  Patient On (Oxygen Delivery Method): BiPAP/CPAP     I&O's Summary    26 Jun 2024 07:01  -  27 Jun 2024 07:00  --------------------------------------------------------  IN: 0.2 mL / OUT: 0 mL / NET: 0.2 mL    27 Jun 2024 07:01  -  27 Jun 2024 16:35  --------------------------------------------------------  IN: 0.2 mL / OUT: 1000 mL / NET: -999.8 mL        GENERAL:  [X ] No acute distress [ ]Lethargic  [ ]Unarousable  [ ]Verbal  [ ]Non-Verbal [ ]Cachexia    BEHAVIORAL/PSYCH:  [ ]Alert and Oriented x  [ ] Anxiety [ ] Delirium [ ] Agitation [X ] Calm   EYES: [X No scleral icterus [ ] Scleral icterus [ ] Closed  ENMT:  [ ]Dry mouth  [ ]No external oral lesions [ X] No external ear or nose lesions  CARDIOVASCULAR:  [ ]Regular [ ]Irregular [ ]Tachy [ ]Not Tachy  [ ]Raheem [ ] Edema [ ] No edema  PULMONARY:  [ ]Tachypnea  [ ]Audible excessive secretions [X ] No labored breathing [ ] labored breathing  GASTROINTESTINAL: [ ]Soft  [ ]Distended  [ X]Not distended [ ]Non tender [ ]Tender  MUSCULOSKELETAL: [ ]No clubbing [ ] clubbing  [ X] No cyanosis [ ] cyanosis  NEUROLOGIC: [ ]No focal deficits  [ ]Follows commands  [ ]Does not follow commands  [ X]Cognitive impairment  [ ]Dysphagia  [ ]Dysarthria  [ ]Paresis   SKIN: [ ] Jaundiced [X ] Non-jaundiced [ ]Rash [ ]No Rash [ ] Warm [ ] Dry  MISC/LINES: [ ] ET tube [ ] Trach [ ]NGT/OGT [ ]PEG [ ]Madsen    CRITICAL CARE:  [ ] Shock Present  [ ]Septic [ ]Cardiogenic [ ]Neurologic [ ]Hypovolemic  [ ]  Vasopressors [ ]  Inotropes   [ ]Respiratory failure present [ ]Mechanical ventilation [ ]Non-invasive ventilatory support [ ]High flow  [ ]Acute  [ ]Chronic [ ]Hypoxic  [ ]Hypercarbic [ ]Other  [ ]Other organ failure     LABS: reviewed by me, notable for: leukocytosis                        12.1   21.34 )-----------( 437      ( 26 Jun 2024 19:10 )             37.7   06-26    140  |  101  |  19  ----------------------------<  183<H>  4.4   |  26  |  0.7    Ca    8.9      26 Jun 2024 19:10    TPro  7.9  /  Alb  3.6  /  TBili  0.4  /  DBili  x   /  AST  36  /  ALT  34  /  AlkPhos  130<H>  06-26  PT/INR - ( 26 Jun 2024 19:10 )   PT: 13.20 sec;   INR: 1.16 ratio         PTT - ( 26 Jun 2024 19:10 )  PTT:26.7 sec    Urinalysis Basic - ( 26 Jun 2024 22:10 )    Color: Yellow / Appearance: Turbid / SG: >1.030 / pH: x  Gluc: x / Ketone: Trace mg/dL  / Bili: Negative / Urobili: 0.2 mg/dL   Blood: x / Protein: 100 mg/dL / Nitrite: Negative   Leuk Esterase: Large / RBC: 38 /HPF / WBC 64 /HPF   Sq Epi: x / Non Sq Epi: 0 /HPF / Bacteria: Occasional /HPF      RADIOLOGY & ADDITIONAL STUDIES: CXR personally reviewed by me: possible opacities    PROTEIN CALORIE MALNUTRITION PRESENT: [ ]mild [ ]moderate [ ]severe [ ]underweight [ ]morbid obesity  https://www.andeal.org/vault/2440/web/files/ONC/Table_Clinical%20Characteristics%20to%20Document%20Malnutrition-White%20JV%20et%20al%202012.pdf    Height (cm): 134 (06-26-24 @ 19:00), 134 (05-31-24 @ 18:19), 134 (05-14-24 @ 02:54)  Weight (kg): 41 (06-26-24 @ 19:01), 40 (05-31-24 @ 19:53), 42.7 (05-14-24 @ 02:54)  BMI (kg/m2): 22.8 (06-26-24 @ 19:01), 22.3 (06-26-24 @ 19:00), 22.3 (05-31-24 @ 19:53)    [ ]PPSV2 < or = to 30% [ ]significant weight loss  [ ]poor nutritional intake  [ ]anasarca      [ ]Artificial Nutrition          Palliative Care Spiritual/Emotional Screening Tool Question  Severity (0-4):                    OR                    [X ] Unable to determine/NA  Score of 2 or greater indicates recommendation of Chaplaincy referral  Chaplaincy Referral: [ ] Yes [ ] Refused [ ] Following     Caregiver [place]:  [ ] Yes [ ] No    OR    [x ] Unable to determine. Will assess at later time if appropriate.  Social Work Referral [ ]  Patient and Family Centered Care Referral [ ]    Anticipatory Grief Present: [ ] Yes [ ] No    OR     [ x] Unable to determine. Will assess at later time if appropriate.  Social Work Referral [ ]  Patient and Family Centered Care Referral [ ]    REFERRALS:   [ ]Chaplaincy  [ ]Hospice  [ ]Child Life  [ ]Social Work  [ ]Case management [ ]Holistic Therapy     Palliative care education provided to patient and/or family    Goals of Care Document:     ______________  Wu Jenkins MD  Palliative Medicine  [facility]   of Geriatric and Palliative Medicine  [phone]

## 2024-07-03 NOTE — CHART NOTE - NSCHARTNOTEFT_GEN_A_CORE
Registered Dietitian Follow-Up     Patient Profile Reviewed                           Yes [X]   No []     Nutrition History Previously Obtained        Yes [X]  No []       Pertinent Subjective Information: Per RN, pt tolerating TF regimen well. No abd distension, n/v. Aide at [facility] reports two episodes of diarrhea 7/3 and .      Pertinent Medical Interventions: 58F with PMH including CP, anemia, Down's syndrome, seizures, OP, hypotension, nonverbal at at baseline, here with respiratory distress, and found to have acute hypoxic respiratory failure from PNA, possibly from aspiration, admitted to medicine stepdown on pressors and IV abx.    #Septic shock (POA)  #Hx Bacteremia with ESBL Proteus and VR E fecalis and fecium.  #Hx of Cerebral Palsy/ Down syndrome/ Nonverbal at baseline -  GI consulted to consider G_Jtube, they do not recommend it at this time as no benefit with aspiration.   - Pt need consent from CAB for PEJ. Following up with , once consent is received reach out to GI so they can plan the procedure.   #Pressure ulcers, bilaterally  #Hx of duodenal perforation - Cont PPI     Diet order:   Diet, NPO with Tube Feed:   Tube Feeding Modality: Gastrostomy  Jevity 1.2 Juventino (JEVITY1.2RTH)  Total Volume for 24 Hours (mL): 1300  Bolus  Total Volume of Bolus (mL):  325  Total # of Feeds: 4  Tube Feed Frequency: Every 6 hours   Tube Feed Start Time: 00:00  Bolus Feed Rate (mL per Hour): 325   Bolus Feed Duration (in Hours): 1  Free Water Flush   Total Volume per Flush (mL): 150   Frequency: Every 6 Hours  Free Water Flush Instructions:  50 mL free water flushes pre/post feeds (24 @ 13:45) [Active]      Anthropometrics:  Height (cm): 134 (24 @ 19:00)  Weight (kg): 41 (24 @ 19:01)  BMI (kg/m2): 22.8 (24 @ 19:01)  Daily Weight in k (-29)      MEDICATIONS  (STANDING):  albuterol/ipratropium for Nebulization 3 milliLiter(s) Nebulizer every 6 hours  calcium carbonate   1250 mG (OsCal) 1 Tablet(s) Oral daily  collagenase Ointment 1 Application(s) Topical two times a day  enoxaparin Injectable 30 milliGRAM(s) SubCutaneous every 24 hours  gabapentin 300 milliGRAM(s) Oral two times a day  levETIRAcetam  Solution 750 milliGRAM(s) Oral two times a day  linezolid  IVPB 600 milliGRAM(s) IV Intermittent every 12 hours  melatonin 3 milliGRAM(s) Oral at bedtime  meropenem  IVPB      meropenem  IVPB 1000 milliGRAM(s) IV Intermittent every 8 hours  midodrine 10 milliGRAM(s) Oral every 8 hours  pantoprazole   Suspension 40 milliGRAM(s) Enteral Tube daily  raloxifene 60 milliGRAM(s) Oral daily  sodium chloride 3%  Inhalation 4 milliLiter(s) Inhalation two times a day    MEDICATIONS  (PRN):  acetaminophen     Tablet .. 650 milliGRAM(s) Oral every 6 hours PRN Temp greater or equal to 38C (100.4F), Mild Pain (1 - 3)  aluminum hydroxide/magnesium hydroxide/simethicone Suspension 30 milliLiter(s) Oral every 4 hours PRN Dyspepsia  ondansetron Injectable 4 milliGRAM(s) IV Push every 8 hours PRN Nausea and/or Vomiting    Pertinent Labs:  @ 07:08: Na 140, BUN 12, Cr <0.5<L>, <H>, K+ 4.7    Physical Findings:  - Appearance: Nonverbal at baseline   - GI function: RN/aide denies n/v. Report 2 diarrhea BM , 7/3   - Tubes: PEG  - Oral/Mouth cavity: NPO w/ TF  - Skin: Per wound : Pressure Injury to right heel and right medial foot, Pressure Injuries to Left medial foot-pink  - Edema: 1+ generalized edema, 1+ edema to L hand/arm, R hand/arm       Nutrition Requirements: Per initial assessment : Estimated needs with consideration for Age, Weight, BMI, Malnutrition, multiple pressure injuries   Weight Used: 41 kg (CBW)     Estimated Energy Needs    Continue [X]  Adjust []  Adjusted Energy Recommendations:  1435-1640kcal/day (35-40 kcal/kg) kcal/day        Estimated Protein Needs    Continue [X]  Adjust []  Adjusted Protein Recommendations: 62-82 g/day (1.5-2 g/kg)  gm/day        Estimated Fluid Needs        Continue [X]  Adjust []  Adjusted Fluid Recommendations:  1 mL/kcal/day     Nutrient Intake: TF at goal rate. Provides: 1560 kcal, 72 g protein, 1449 mL total H2O (1049 mL free water from formula)    [X] Previous Nutrition Diagnosis: Severe PCM            [X] Ongoing          [] Resolved     Nutrition Education: Deferred     Goal/Expected Outcome: Pt to meet >85% and <105% of estimated needs via TF within 3-5 days.      Indicator/Monitoring: EN order/tolerance, weights, labs, GI s/s, BG, skin status, NFPE, GOC    Recommendation:  1. Continue with current TF order/regimen  Diarrhea sx may be due to medications. Will continue to monitor   2. Maintain all aspiration precautions    Pt at high risk f/u in 3-5 days or prn    RD Team to remain available: Faustino Hutchinson x [phone] or via TEAMS

## 2024-07-03 NOTE — PROGRESS NOTE ADULT - SUBJECTIVE AND OBJECTIVE BOX
JERICHOTEA  58y  Female    Reason for Admission:   OVERNIGHT/INTERIM: Pt seen and examined at bedside during AM rounds. No major or acute events overnight. Pressure ulcers b/l dressing was changed in the AM. Patient switched to 4L oxygen, weaned down from 5L.     Vital Signs Last 24 Hrs  T(C): 37 (03 Jul 2024 05:43), Max: 37 (03 Jul 2024 05:43)  T(F): 98.6 (03 Jul 2024 05:43), Max: 98.6 (03 Jul 2024 05:43)  HR: 105 (03 Jul 2024 05:43) (100 - 120)  BP: 110/60 (03 Jul 2024 05:43) (90/51 - 111/68)  BP(mean): --  RR: 18 (03 Jul 2024 05:43) (18 - 20)  SpO2: 98% (03 Jul 2024 05:43) (95% - 98%)    Parameters below as of 02 Jul 2024 20:15  Patient On (Oxygen Delivery Method): nasal cannula 4L    PHYSICAL EXAM:  General: NAD, alert   ENT: MMM  Neck: Supple, No JVD  Lungs: diminished bilaterally, no crackles   Cardio: RRR, S1/S2, No murmurs  Abdomen: Soft, Nontender, Nondistended; Bowel sounds present  Extremities: No cyanosis, No edema, muscle atrophy  NEUROLOGIC:  does not follow commands   PSYCH: lethargic, nonverbal.      LABS:                        9.2    6.82  )-----------( 363      ( 03 Jul 2024 07:08 )             29.5     07-03    140  |  100  |  12  ----------------------------<  130<H>  4.7   |  29  |  <0.5<L>    Ca    8.7      03 Jul 2024 07:08          Urinalysis Basic - ( 03 Jul 2024 07:08 )    Color: x / Appearance: x / SG: x / pH: x  Gluc: 130 mg/dL / Ketone: x  / Bili: x / Urobili: x   Blood: x / Protein: x / Nitrite: x   Leuk Esterase: x / RBC: x / WBC x   Sq Epi: x / Non Sq Epi: x / Bacteria: x          MedsMEDICATIONS  (STANDING):  albuterol/ipratropium for Nebulization 3 milliLiter(s) Nebulizer every 6 hours  calcium carbonate   1250 mG (OsCal) 1 Tablet(s) Oral daily  collagenase Ointment 1 Application(s) Topical two times a day  enoxaparin Injectable 30 milliGRAM(s) SubCutaneous every 24 hours  gabapentin 300 milliGRAM(s) Oral two times a day  levETIRAcetam  Solution 750 milliGRAM(s) Oral two times a day  linezolid  IVPB 600 milliGRAM(s) IV Intermittent every 12 hours  melatonin 3 milliGRAM(s) Oral at bedtime  meropenem  IVPB 1000 milliGRAM(s) IV Intermittent every 8 hours  meropenem  IVPB      midodrine 10 milliGRAM(s) Oral every 8 hours  pantoprazole   Suspension 40 milliGRAM(s) Enteral Tube daily  raloxifene 60 milliGRAM(s) Oral daily  sodium chloride 3%  Inhalation 4 milliLiter(s) Inhalation two times a day    MEDICATIONS  (PRN):  acetaminophen     Tablet .. 650 milliGRAM(s) Oral every 6 hours PRN Temp greater or equal to 38C (100.4F), Mild Pain (1 - 3)  aluminum hydroxide/magnesium hydroxide/simethicone Suspension 30 milliLiter(s) Oral every 4 hours PRN Dyspepsia  ondansetron Injectable 4 milliGRAM(s) IV Push every 8 hours PRN Nausea and/or Vomiting

## 2024-07-03 NOTE — PROGRESS NOTE ADULT - SUBJECTIVE AND OBJECTIVE BOX
Patient is a 58y old  Female who presents with a chief complaint of Sepsis (02 Jul 2024 15:02)      Patient seen and examined at bedside. no acute issues overnight     ALLERGIES:  chlorhexidine containing compounds (Rash (Mild to Mod))    MEDICATIONS:  acetaminophen     Tablet .. 650 milliGRAM(s) Oral every 6 hours PRN  albuterol/ipratropium for Nebulization 3 milliLiter(s) Nebulizer every 6 hours  aluminum hydroxide/magnesium hydroxide/simethicone Suspension 30 milliLiter(s) Oral every 4 hours PRN  calcium carbonate   1250 mG (OsCal) 1 Tablet(s) Oral daily  collagenase Ointment 1 Application(s) Topical two times a day  enoxaparin Injectable 30 milliGRAM(s) SubCutaneous every 24 hours  gabapentin 300 milliGRAM(s) Oral two times a day  levETIRAcetam  Solution 750 milliGRAM(s) Oral two times a day  linezolid  IVPB 600 milliGRAM(s) IV Intermittent every 12 hours  melatonin 3 milliGRAM(s) Oral at bedtime  meropenem  IVPB 1000 milliGRAM(s) IV Intermittent every 8 hours  meropenem  IVPB      midodrine 10 milliGRAM(s) Oral every 8 hours  ondansetron Injectable 4 milliGRAM(s) IV Push every 8 hours PRN  pantoprazole   Suspension 40 milliGRAM(s) Enteral Tube daily  raloxifene 60 milliGRAM(s) Oral daily  sodium chloride 3%  Inhalation 4 milliLiter(s) Inhalation two times a day    Vital Signs Last 24 Hrs  Vital Signs Last 24 Hrs  T(C): 36.8 (03 Jul 2024 11:46), Max: 37 (03 Jul 2024 05:43)  T(F): 98.3 (03 Jul 2024 11:46), Max: 98.6 (03 Jul 2024 05:43)  HR: 134 (03 Jul 2024 11:46) (100 - 134)  BP: 115/67 (03 Jul 2024 11:46) (90/51 - 115/67)  BP(mean): --  RR: 18 (03 Jul 2024 11:46) (18 - 20)  SpO2: 98% (03 Jul 2024 05:43) (95% - 98%)    Parameters below as of 02 Jul 2024 20:15  Patient On (Oxygen Delivery Method): nasal cannula            PHYSICAL EXAM:  General: NAD, alert  ENT: MMM  Neck: Supple, No JVD  Lungs: left side coarse crackles   Cardio: RRR, S1/S2,   Abdomen: Soft, Nontender, Nondistended; Bowel sounds present  Extremities: No cyanosis, No edema    LABS:               LABS:                        9.2    6.82  )-----------( 363      ( 03 Jul 2024 07:08 )             29.5     07-03    140  |  100  |  12  ----------------------------<  130<H>  4.7   |  29  |  <0.5<L>    Ca    8.7      03 Jul 2024 07:08                        06-01 Chol 103 mg/dL LDL -- HDL 31 mg/dL Trig 90 mg/dL                  Urinalysis Basic - ( 02 Jul 2024 07:53 )    Color: x / Appearance: x / SG: x / pH: x  Gluc: 157 mg/dL / Ketone: x  / Bili: x / Urobili: x   Blood: x / Protein: x / Nitrite: x   Leuk Esterase: x / RBC: x / WBC x   Sq Epi: x / Non Sq Epi: x / Bacteria: x        Culture - Blood (collected 29 Jun 2024 17:33)  Source: .Blood None  Preliminary Report (02 Jul 2024 02:02):    No growth at 48 Hours    Culture - Urine (collected 26 Jun 2024 22:10)  Source: Catheterized None  Final Report (28 Jun 2024 21:21):    <10,000 CFU/mL Normal Urogenital Mili    Culture - Blood (collected 26 Jun 2024 19:14)  Source: .Blood Blood-Peripheral  Final Report (01 Jul 2024 23:00):    No growth at 5 days    Culture - Blood (collected 26 Jun 2024 19:14)  Source: .Blood Blood-Peripheral  Final Report (01 Jul 2024 23:00):    No growth at 5 days          RADIOLOGY & ADDITIONAL TESTS:    Care Discussed with Consultants/Other Providers:

## 2024-07-04 LAB
ANION GAP SERPL CALC-SCNC: 10 MMOL/L — SIGNIFICANT CHANGE UP (ref 7–14)
BUN SERPL-MCNC: 11 MG/DL — SIGNIFICANT CHANGE UP (ref 10–20)
CALCIUM SERPL-MCNC: 8.9 MG/DL — SIGNIFICANT CHANGE UP (ref 8.4–10.4)
CHLORIDE SERPL-SCNC: 99 MMOL/L — SIGNIFICANT CHANGE UP (ref 98–110)
CO2 SERPL-SCNC: 30 MMOL/L — SIGNIFICANT CHANGE UP (ref 17–32)
CREAT SERPL-MCNC: 0.5 MG/DL — LOW (ref 0.7–1.5)
EGFR: 109 ML/MIN/1.73M2 — SIGNIFICANT CHANGE UP
GLUCOSE SERPL-MCNC: 100 MG/DL — HIGH (ref 70–99)
POTASSIUM SERPL-MCNC: 4.8 MMOL/L — SIGNIFICANT CHANGE UP (ref 3.5–5)
POTASSIUM SERPL-SCNC: 4.8 MMOL/L — SIGNIFICANT CHANGE UP (ref 3.5–5)
SODIUM SERPL-SCNC: 139 MMOL/L — SIGNIFICANT CHANGE UP (ref 135–146)

## 2024-07-04 PROCEDURE — 99232 SBSQ HOSP IP/OBS MODERATE 35: CPT

## 2024-07-04 RX ADMIN — LINEZOLID 300 MILLIGRAM(S): 600 TABLET, FILM COATED ORAL at 18:15

## 2024-07-04 RX ADMIN — Medication 4 MILLILITER(S): at 20:14

## 2024-07-04 RX ADMIN — MEROPENEM 100 MILLIGRAM(S): 500 INJECTION, POWDER, FOR SOLUTION INTRAVENOUS at 06:03

## 2024-07-04 RX ADMIN — Medication 300 MILLIGRAM(S): at 18:16

## 2024-07-04 RX ADMIN — Medication 300 MILLIGRAM(S): at 06:03

## 2024-07-04 RX ADMIN — MIDODRINE HYDROCHLORIDE 10 MILLIGRAM(S): 10 TABLET ORAL at 21:42

## 2024-07-04 RX ADMIN — MEROPENEM 100 MILLIGRAM(S): 500 INJECTION, POWDER, FOR SOLUTION INTRAVENOUS at 12:33

## 2024-07-04 RX ADMIN — Medication 1 APPLICATION(S): at 06:00

## 2024-07-04 RX ADMIN — Medication 1 TABLET(S): at 12:33

## 2024-07-04 RX ADMIN — Medication 4 MILLILITER(S): at 07:43

## 2024-07-04 RX ADMIN — LEVETIRACETAM 750 MILLIGRAM(S): 100 INJECTION INTRAVENOUS at 06:03

## 2024-07-04 RX ADMIN — ENOXAPARIN SODIUM 30 MILLIGRAM(S): 100 INJECTION SUBCUTANEOUS at 12:33

## 2024-07-04 RX ADMIN — IPRATROPIUM BROMIDE AND ALBUTEROL SULFATE 3 MILLILITER(S): .5; 3 SOLUTION RESPIRATORY (INHALATION) at 07:42

## 2024-07-04 RX ADMIN — IPRATROPIUM BROMIDE AND ALBUTEROL SULFATE 3 MILLILITER(S): .5; 3 SOLUTION RESPIRATORY (INHALATION) at 20:14

## 2024-07-04 RX ADMIN — Medication 1 APPLICATION(S): at 18:03

## 2024-07-04 RX ADMIN — MIDODRINE HYDROCHLORIDE 10 MILLIGRAM(S): 10 TABLET ORAL at 06:02

## 2024-07-04 RX ADMIN — LINEZOLID 300 MILLIGRAM(S): 600 TABLET, FILM COATED ORAL at 07:32

## 2024-07-04 RX ADMIN — IPRATROPIUM BROMIDE AND ALBUTEROL SULFATE 3 MILLILITER(S): .5; 3 SOLUTION RESPIRATORY (INHALATION) at 13:49

## 2024-07-04 RX ADMIN — Medication 3 MILLIGRAM(S): at 21:42

## 2024-07-04 RX ADMIN — MEROPENEM 100 MILLIGRAM(S): 500 INJECTION, POWDER, FOR SOLUTION INTRAVENOUS at 21:43

## 2024-07-04 RX ADMIN — RALOXIFENE HYDROCHLORIDE 60 MILLIGRAM(S): 60 TABLET, FILM COATED ORAL at 12:33

## 2024-07-04 RX ADMIN — PANTOPRAZOLE SODIUM 40 MILLIGRAM(S): 40 INJECTION, POWDER, FOR SOLUTION INTRAVENOUS at 12:33

## 2024-07-04 RX ADMIN — LEVETIRACETAM 750 MILLIGRAM(S): 100 INJECTION INTRAVENOUS at 18:16

## 2024-07-04 RX ADMIN — MIDODRINE HYDROCHLORIDE 10 MILLIGRAM(S): 10 TABLET ORAL at 12:33

## 2024-07-04 NOTE — PROGRESS NOTE ADULT - SUBJECTIVE AND OBJECTIVE BOX
JERICHOTEA  58y  Female    Reason for Admission:   OVERNIGHT/INTERIM: Pt seen and examined at bedside during AM rounds. 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 No acute or major events overnight. Patient passed trial of voiding, initial bladder scan around 160, will repeat and continue to monitor. Patient currently on 4L oxygen, plan to wean down to 3L today. Podiatry saw the patient for b/l pressure ulcer wounds, dressing changes and wound care recommendations given. Pt continues to be on abx until 7/5/24.       Vital Signs Last 24 Hrs  T(C): 36.6 (04 Jul 2024 05:16), Max: 36.8 (03 Jul 2024 11:46)  T(F): 97.9 (04 Jul 2024 05:16), Max: 98.3 (03 Jul 2024 11:46)  HR: 104 (04 Jul 2024 05:16) (104 - 134)  BP: 100/62 (04 Jul 2024 05:16) (90/48 - 115/67)  BP(mean): --  RR: 19 (04 Jul 2024 05:16) (16 - 19)  SpO2: 96% (03 Jul 2024 12:30) (96% - 96%)    Parameters below as of 03 Jul 2024 12:30  Patient On (Oxygen Delivery Method): nasal cannula  O2 Flow (L/min): 4      PHYSICAL EXAM:  General: NAD, alert   ENT: MMM  Neck: Supple, No JVD  Lungs: diminished bilaterally, no crackles   Cardio: RRR, S1/S2, No murmurs  Abdomen: Soft, Nontender, Nondistended; Bowel sounds present  Extremities: No cyanosis, No edema, muscle atrophy  NEUROLOGIC:  does not follow commands   PSYCH: lethargic, nonverbal.        LABS:                       9.2    6.82  )-----------( 363      ( 03 Jul 2024 07:08 )             29.5     07-04    139  |  99  |  11  ----------------------------<  100<H>  4.8   |  30  |  0.5<L>    Ca    8.9      04 Jul 2024 07:27          Urinalysis Basic - ( 04 Jul 2024 07:27 )    Color: x / Appearance: x / SG: x / pH: x  Gluc: 100 mg/dL / Ketone: x  / Bili: x / Urobili: x   Blood: x / Protein: x / Nitrite: x   Leuk Esterase: x / RBC: x / WBC x   Sq Epi: x / Non Sq Epi: x / Bacteria: x    MedsMEDICATIONS  (STANDING):  albuterol/ipratropium for Nebulization 3 milliLiter(s) Nebulizer every 6 hours  calcium carbonate   1250 mG (OsCal) 1 Tablet(s) Oral daily  collagenase Ointment 1 Application(s) Topical two times a day  enoxaparin Injectable 30 milliGRAM(s) SubCutaneous every 24 hours  gabapentin 300 milliGRAM(s) Oral two times a day  levETIRAcetam  Solution 750 milliGRAM(s) Oral two times a day  linezolid  IVPB 600 milliGRAM(s) IV Intermittent every 12 hours  melatonin 3 milliGRAM(s) Oral at bedtime  meropenem  IVPB      meropenem  IVPB 1000 milliGRAM(s) IV Intermittent every 8 hours  midodrine 10 milliGRAM(s) Oral every 8 hours  pantoprazole   Suspension 40 milliGRAM(s) Enteral Tube daily  raloxifene 60 milliGRAM(s) Oral daily  sodium chloride 0.9%. 1000 milliLiter(s) (50 mL/Hr) IV Continuous <Continuous>  sodium chloride 3%  Inhalation 4 milliLiter(s) Inhalation two times a day    MEDICATIONS  (PRN):  acetaminophen     Tablet .. 650 milliGRAM(s) Oral every 6 hours PRN Temp greater or equal to 38C (100.4F), Mild Pain (1 - 3)  aluminum hydroxide/magnesium hydroxide/simethicone Suspension 30 milliLiter(s) Oral every 4 hours PRN Dyspepsia  ondansetron Injectable 4 milliGRAM(s) IV Push every 8 hours PRN Nausea and/or Vomiting

## 2024-07-04 NOTE — PROGRESS NOTE ADULT - ASSESSMENT
58F with PMH including CP, anemia, Down's syndrome, seizures, OP, hypotension, nonverbal at at baseline, here with respiratory distress, and found to have acute hypoxic respiratory failure from PNA, possibly from aspiration, admitted to medicine stepdown on pressors and IV abx.    #Acute Hypoxemic Respiratory Failure due to suspected Aspiration PNA, recurrent  #Septic shock (POA)  #Hx Bacteremia with ESBL Proteus and VR E fecalis and fecium  #Hx of Cerebral Palsy/ Down syndrome/ Nonverbal at baseline   - Last Lactate 1.5 (29/6) down from 2.7, 3.9, 5.4.   - CTA chest (6/27/24):  a) reported Diffuse, near complete left lower lobe and upper lobe mixed consolidation and atelectasis, with some remaining aeration to left upper lobe.                        b) Occlusion of right mainstem bronchus; correlation for aspiration suggested.                        c) Near complete resolution of right lung airspace opacification, with  few residual likely inflammatory/infectious ground-glass airspace opacities.                        d) No evidence of acute pulmonary embolism.  - Weaned down from venturi mask (7/1/24), currently on high flow nasal cannula 4L (7/3/24). Plan to wean down to 3L today (7/4/24)  - Midodrine 10mg q8hrs  - so far, Ucx and blood cx NGTD, procal 0.6  - appreciate ID eval, cw meropenem and Zyvox (switched to PO, per ID recommendations 7/1/24), d/c doxycycline. Last dose scheduled for 7/5/24  - Chest PT via vest, nebs, daily chest xrays.  - strict aspiration precautions, resume PEG feeds   - palliative team following: patient is already DNR/DNI, MOLST checklist completed on prior admission with [facility] and [facility]  - GI consulted to consider G_Jtube, they do not recommend it at this time as no benefit with aspiration.   - Pt need consent from CAB for PEJ. Following up with , once consent is received reach out to GI so they can plan the procedure.   - Pt passed trial of voiding 7/3/24, f/u bladder scans.     #Seizure   - c/w Keppra     #Hx of duodenal perforation  - Continue PPI     #Pressure ulcers, bilaterally  - Dressing changed this AM 7/3/24  - Wound care consulted, appreciate recs  - Podiatry consulted, appreciate recs    Overall prognosis is poor, high risk of decompensation  Pending: monitor BP, c/w IV abx, resume tube feeds, GI consult, oxygen weaning

## 2024-07-05 LAB
CULTURE RESULTS: SIGNIFICANT CHANGE UP
SPECIMEN SOURCE: SIGNIFICANT CHANGE UP

## 2024-07-05 PROCEDURE — 99232 SBSQ HOSP IP/OBS MODERATE 35: CPT

## 2024-07-05 PROCEDURE — 93010 ELECTROCARDIOGRAM REPORT: CPT

## 2024-07-05 PROCEDURE — 71045 X-RAY EXAM CHEST 1 VIEW: CPT | Mod: 26

## 2024-07-05 RX ORDER — BACITRACIN 500 UNIT/G
1 OINTMENT (GRAM) TOPICAL EVERY 8 HOURS
Refills: 0 | Status: DISCONTINUED | OUTPATIENT
Start: 2024-07-05 | End: 2024-07-10

## 2024-07-05 RX ADMIN — IPRATROPIUM BROMIDE AND ALBUTEROL SULFATE 3 MILLILITER(S): .5; 3 SOLUTION RESPIRATORY (INHALATION) at 20:24

## 2024-07-05 RX ADMIN — MIDODRINE HYDROCHLORIDE 10 MILLIGRAM(S): 10 TABLET ORAL at 22:46

## 2024-07-05 RX ADMIN — MEROPENEM 100 MILLIGRAM(S): 500 INJECTION, POWDER, FOR SOLUTION INTRAVENOUS at 14:11

## 2024-07-05 RX ADMIN — LEVETIRACETAM 750 MILLIGRAM(S): 100 INJECTION INTRAVENOUS at 06:46

## 2024-07-05 RX ADMIN — MEROPENEM 100 MILLIGRAM(S): 500 INJECTION, POWDER, FOR SOLUTION INTRAVENOUS at 07:04

## 2024-07-05 RX ADMIN — IPRATROPIUM BROMIDE AND ALBUTEROL SULFATE 3 MILLILITER(S): .5; 3 SOLUTION RESPIRATORY (INHALATION) at 15:31

## 2024-07-05 RX ADMIN — LINEZOLID 300 MILLIGRAM(S): 600 TABLET, FILM COATED ORAL at 18:08

## 2024-07-05 RX ADMIN — PANTOPRAZOLE SODIUM 40 MILLIGRAM(S): 40 INJECTION, POWDER, FOR SOLUTION INTRAVENOUS at 12:23

## 2024-07-05 RX ADMIN — LEVETIRACETAM 750 MILLIGRAM(S): 100 INJECTION INTRAVENOUS at 18:08

## 2024-07-05 RX ADMIN — Medication 300 MILLIGRAM(S): at 18:08

## 2024-07-05 RX ADMIN — Medication 300 MILLIGRAM(S): at 06:47

## 2024-07-05 RX ADMIN — ENOXAPARIN SODIUM 30 MILLIGRAM(S): 100 INJECTION SUBCUTANEOUS at 12:22

## 2024-07-05 RX ADMIN — Medication 1 APPLICATION(S): at 22:46

## 2024-07-05 RX ADMIN — IPRATROPIUM BROMIDE AND ALBUTEROL SULFATE 3 MILLILITER(S): .5; 3 SOLUTION RESPIRATORY (INHALATION) at 08:18

## 2024-07-05 RX ADMIN — LINEZOLID 300 MILLIGRAM(S): 600 TABLET, FILM COATED ORAL at 06:48

## 2024-07-05 RX ADMIN — MIDODRINE HYDROCHLORIDE 10 MILLIGRAM(S): 10 TABLET ORAL at 14:11

## 2024-07-05 RX ADMIN — Medication 1 TABLET(S): at 12:23

## 2024-07-05 RX ADMIN — Medication 1 APPLICATION(S): at 18:09

## 2024-07-05 RX ADMIN — MIDODRINE HYDROCHLORIDE 10 MILLIGRAM(S): 10 TABLET ORAL at 06:48

## 2024-07-05 RX ADMIN — Medication 1 APPLICATION(S): at 07:04

## 2024-07-05 RX ADMIN — RALOXIFENE HYDROCHLORIDE 60 MILLIGRAM(S): 60 TABLET, FILM COATED ORAL at 12:23

## 2024-07-05 RX ADMIN — Medication 3 MILLIGRAM(S): at 22:46

## 2024-07-05 RX ADMIN — Medication 1 APPLICATION(S): at 14:11

## 2024-07-05 NOTE — PROGRESS NOTE ADULT - ASSESSMENT
58F with PMH including CP, anemia, Down's syndrome, seizures, OP, hypotension, nonverbal at at baseline, here with respiratory distress, and found to have acute hypoxic respiratory failure from PNA, possibly from aspiration, admitted to medicine stepdown on pressors and IV abx.    #Acute Hypoxemic Respiratory Failure due to suspected Aspiration PNA, recurrent  #Septic shock (POA)  #Hx Bacteremia with ESBL Proteus and VR E fecalis and fecium  #Hx of Cerebral Palsy/ Down syndrome/ Nonverbal at baseline   - Last Lactate 1.5 (29/6) down from 2.7, 3.9, 5.4.   - CTA chest (6/27/24):  a) reported Diffuse, near complete left lower lobe and upper lobe mixed consolidation and atelectasis, with some remaining aeration to left upper lobe.                        b) Occlusion of right mainstem bronchus; correlation for aspiration suggested.                        c) Near complete resolution of right lung airspace opacification, with  few residual likely inflammatory/infectious ground-glass airspace opacities.                        d) No evidence of acute pulmonary embolism.  - Weaned down from venturi mask (7/1/24), currently on high flow nasal cannula 4L (7/3/24). Plan to wean down to 3L today (7/4/24)  - Midodrine 10mg q8hrs  - so far, Ucx and blood cx NGTD, procal 0.6  - appreciate ID eval, cw meropenem and Zyvox (switched to PO, per ID recommendations 7/1/24), d/c doxycycline. Last dose scheduled for 7/5/24  - Chest PT via vest, nebs, daily chest xrays.  - strict aspiration precautions, resume PEG feeds   - palliative team following: patient is already DNR/DNI, MOLST checklist completed on prior admission with [facility] and [facility]  - GI consulted to consider G_Jtube, they do not recommend it at this time as no benefit with aspiration.   - Pt need consent from CAB for PEJ. Following up with , once consent is received reach out to GI so they can plan the procedure.   - Pt passed trial of voiding 7/3/24,bladder scans normal 7/4/24.  - f/u ID recommendations for discharge  - f/u Repeat CXR for sweating  - f/u ECG     #Seizure   - c/w Keppra     #Hx of duodenal perforation  - Continue PPI     #Pressure ulcers, bilaterally  - Dressing changed this AM 7/3/24  - Wound care consulted, appreciate recs  - Podiatry consulted, appreciate recs    Overall prognosis is poor, high risk of decompensation  Pending: monitor BP, c/w IV abx, resume tube feeds, GI consult, oxygen weaning     58F with PMH including CP, anemia, Down's syndrome, seizures, OP, hypotension, nonverbal at at baseline, here with respiratory distress, and found to have acute hypoxic respiratory failure from PNA, possibly from aspiration, admitted to medicine stepdown on pressors and IV abx.    #Acute Hypoxemic Respiratory Failure due to suspected Aspiration PNA, recurrent  #Septic shock (POA)  #Hx Bacteremia with ESBL Proteus and VR E fecalis and fecium  #Hx of Cerebral Palsy/ Down syndrome/ Nonverbal at baseline   - Last Lactate 1.5 (29/6) down from 2.7, 3.9, 5.4.   - CTA chest (6/27/24):  a) reported Diffuse, near complete left lower lobe and upper lobe mixed consolidation and atelectasis, with some remaining aeration to left upper lobe.                        b) Occlusion of right mainstem bronchus; correlation for aspiration suggested.                        c) Near complete resolution of right lung airspace opacification, with  few residual likely inflammatory/infectious ground-glass airspace opacities.                        d) No evidence of acute pulmonary embolism.  - Weaned down from venturi mask (7/1/24), currently on high flow nasal cannula 4L (7/3/24). Plan to wean down to 3L today (7/4/24)  - Midodrine 10mg q8hrs  - so far, Ucx and blood cx NGTD, procal 0.6  - appreciate ID eval, cw meropenem and Zyvox (switched to PO, per ID recommendations 7/1/24), d/c doxycycline. Last dose scheduled for 7/5/24  - Chest PT via vest, nebs, daily chest xrays.  - strict aspiration precautions, resume PEG feeds   - palliative team following: patient is already DNR/DNI, MOLST checklist completed on prior admission with CAB and [facility]  - GI consulted to consider G_Jtube, they do not recommend it at this time as no benefit with aspiration.   - Pt need consent from CAB for PEJ. Following up with , once consent is received reach out to GI so they can plan the procedure.   - Pt passed trial of voiding 7/3/24,bladder scans normal 7/4/24.  - f/u ID recommendations for discharge  - CXR ordered for sweating, per ID recommendation for suspicion of worsening pneumonia: Impression: Pneumonia. Slight improvement.  - f/u ECG     #Seizure   - c/w Keppra     #Hx of duodenal perforation  - Continue PPI     #Pressure ulcers, bilaterally  - Dressing changed this AM 7/3/24  - Wound care consulted, appreciate recs  - Podiatry consulted, appreciate recs    Overall prognosis is poor, high risk of decompensation  Pending: monitor BP, c/w IV abx, resume tube feeds, GI consult, oxygen weaning

## 2024-07-05 NOTE — PROGRESS NOTE ADULT - SUBJECTIVE AND OBJECTIVE BOX
JERICHO TEA  58y  Female    Reason for Admission:   OVERNIGHT/INTERIM: Pt seen and examined at bedside during AM rounds. No acute or significant events overnight. Patient noted to have a small wound on right wrist, ordered bacitracin and will f/u with nursing. Dressing changed in AM for b/l pressure ulcer. Pt weaned off from 4L to 3L oxygen, saturating at 93-94. Pt previously on venturi mask and has been weaned off oxygen requirements steadily since earlier in the week. Patient afebrile, but has been noted to be sweating. Spoke with ID who recommended CXR repeat. ECG requested to f/u on tachycardia, pt has been tachycardic to 120s in the AM.       Vital Signs Last 24 Hrs  T(C): 36.4 (05 Jul 2024 05:12), Max: 37.2 (04 Jul 2024 12:26)  T(F): 97.6 (05 Jul 2024 05:12), Max: 98.9 (04 Jul 2024 12:26)  HR: 104 (05 Jul 2024 05:12) (104 - 110)  BP: 102/59 (05 Jul 2024 05:12) (101/61 - 111/73)  BP(mean): --  RR: 19 (05 Jul 2024 05:12) (18 - 19)  SpO2: 97% (04 Jul 2024 21:05) (97% - 97%)    Parameters below as of 04 Jul 2024 21:05  Patient On (Oxygen Delivery Method): nasal cannula  O2 Flow (L/min): 4      PHYSICAL EXAM:  GENERAL: NAD, well-groomed, well-developed  HEENT - NC/AT, pupils equal and reactive to light,   NECK: Supple  CHEST/LUNG: Clear to auscultation bilaterally; No rales, rhonchi, wheezing  HEART: Regular rate and rhythm; No murmurs, rubs, or gallops  ABDOMEN: Soft, Nontender, Nondistended; Bowel sounds present  EXTREMITIES:   No clubbing, cyanosis, or edema  SKIN: No rashes or lesions      LABS:      LABS:      07-04    139  |  99  |  11  ----------------------------<  100<H>  4.8   |  30  |  0.5<L>    Ca    8.9      04 Jul 2024 07:27          Urinalysis Basic - ( 04 Jul 2024 07:27 )    Color: x / Appearance: x / SG: x / pH: x  Gluc: 100 mg/dL / Ketone: x  / Bili: x / Urobili: x   Blood: x / Protein: x / Nitrite: x   Leuk Esterase: x / RBC: x / WBC x   Sq Epi: x / Non Sq Epi: x / Bacteria: x          MedsMEDICATIONS  (STANDING):  albuterol/ipratropium for Nebulization 3 milliLiter(s) Nebulizer every 6 hours  bacitracin   Ointment 1 Application(s) Topical every 8 hours  calcium carbonate   1250 mG (OsCal) 1 Tablet(s) Oral daily  collagenase Ointment 1 Application(s) Topical two times a day  enoxaparin Injectable 30 milliGRAM(s) SubCutaneous every 24 hours  gabapentin 300 milliGRAM(s) Oral two times a day  levETIRAcetam  Solution 750 milliGRAM(s) Oral two times a day  linezolid  IVPB 600 milliGRAM(s) IV Intermittent every 12 hours  melatonin 3 milliGRAM(s) Oral at bedtime  meropenem  IVPB      meropenem  IVPB 1000 milliGRAM(s) IV Intermittent every 8 hours  midodrine 10 milliGRAM(s) Oral every 8 hours  pantoprazole   Suspension 40 milliGRAM(s) Enteral Tube daily  raloxifene 60 milliGRAM(s) Oral daily  sodium chloride 3%  Inhalation 4 milliLiter(s) Inhalation two times a day    MEDICATIONS  (PRN):  acetaminophen     Tablet .. 650 milliGRAM(s) Oral every 6 hours PRN Temp greater or equal to 38C (100.4F), Mild Pain (1 - 3)  aluminum hydroxide/magnesium hydroxide/simethicone Suspension 30 milliLiter(s) Oral every 4 hours PRN Dyspepsia  ondansetron Injectable 4 milliGRAM(s) IV Push every 8 hours PRN Nausea and/or Vomiting             JERICHO TEA  58y  Female    Reason for Admission:   OVERNIGHT/INTERIM: Pt seen and examined at bedside during AM rounds. No acute or significant events overnight. Patient noted to have a small wound on right wrist, ordered bacitracin and will f/u with nursing. Dressing changed in AM for b/l pressure ulcer. Pt weaned off from 4L to 3L oxygen, saturating at 93-94. Pt previously on venturi mask and has been weaned off oxygen requirements steadily since earlier in the week. Patient afebrile, but has been noted to be sweating. Spoke with ID who recommended CXR repeat, showed slight improvement for pneumonia. ECG requested to f/u on tachycardia, pt has been tachycardic to 120s in the AM.       Vital Signs Last 24 Hrs  T(C): 36.4 (05 Jul 2024 05:12), Max: 37.2 (04 Jul 2024 12:26)  T(F): 97.6 (05 Jul 2024 05:12), Max: 98.9 (04 Jul 2024 12:26)  HR: 104 (05 Jul 2024 05:12) (104 - 110)  BP: 102/59 (05 Jul 2024 05:12) (101/61 - 111/73)  BP(mean): --  RR: 19 (05 Jul 2024 05:12) (18 - 19)  SpO2: 97% (04 Jul 2024 21:05) (97% - 97%)    Parameters below as of 04 Jul 2024 21:05  Patient On (Oxygen Delivery Method): nasal cannula  O2 Flow (L/min): 4      PHYSICAL EXAM:  GENERAL: NAD, well-groomed, well-developed  HEENT - NC/AT, pupils equal and reactive to light,   NECK: Supple  CHEST/LUNG: Clear to auscultation bilaterally; No rales, rhonchi, wheezing  HEART: Regular rate and rhythm; No murmurs, rubs, or gallops  ABDOMEN: Soft, Nontender, Nondistended; Bowel sounds present  EXTREMITIES:   No clubbing, cyanosis, or edema  SKIN: No rashes or lesions      LABS:      LABS:      07-04    139  |  99  |  11  ----------------------------<  100<H>  4.8   |  30  |  0.5<L>    Ca    8.9      04 Jul 2024 07:27          Urinalysis Basic - ( 04 Jul 2024 07:27 )    Color: x / Appearance: x / SG: x / pH: x  Gluc: 100 mg/dL / Ketone: x  / Bili: x / Urobili: x   Blood: x / Protein: x / Nitrite: x   Leuk Esterase: x / RBC: x / WBC x   Sq Epi: x / Non Sq Epi: x / Bacteria: x          MedsMEDICATIONS  (STANDING):  albuterol/ipratropium for Nebulization 3 milliLiter(s) Nebulizer every 6 hours  bacitracin   Ointment 1 Application(s) Topical every 8 hours  calcium carbonate   1250 mG (OsCal) 1 Tablet(s) Oral daily  collagenase Ointment 1 Application(s) Topical two times a day  enoxaparin Injectable 30 milliGRAM(s) SubCutaneous every 24 hours  gabapentin 300 milliGRAM(s) Oral two times a day  levETIRAcetam  Solution 750 milliGRAM(s) Oral two times a day  linezolid  IVPB 600 milliGRAM(s) IV Intermittent every 12 hours  melatonin 3 milliGRAM(s) Oral at bedtime  meropenem  IVPB      meropenem  IVPB 1000 milliGRAM(s) IV Intermittent every 8 hours  midodrine 10 milliGRAM(s) Oral every 8 hours  pantoprazole   Suspension 40 milliGRAM(s) Enteral Tube daily  raloxifene 60 milliGRAM(s) Oral daily  sodium chloride 3%  Inhalation 4 milliLiter(s) Inhalation two times a day    MEDICATIONS  (PRN):  acetaminophen     Tablet .. 650 milliGRAM(s) Oral every 6 hours PRN Temp greater or equal to 38C (100.4F), Mild Pain (1 - 3)  aluminum hydroxide/magnesium hydroxide/simethicone Suspension 30 milliLiter(s) Oral every 4 hours PRN Dyspepsia  ondansetron Injectable 4 milliGRAM(s) IV Push every 8 hours PRN Nausea and/or Vomiting

## 2024-07-05 NOTE — PROGRESS NOTE ADULT - NS ATTEST RISK PROBLEM GEN_ALL_CORE FT
Antibiotic management, monitoring of drug toxicity.   Reviewed labs and imaging     I have personally seen and examined this patient.    I have reviewed all pertinent clinical information and reviewed all relevant imaging and diagnostic studies personally.   I counseled the patient about diagnostic testing and treatment plan. All questions were answered.   I discussed recommendations with the primary team.
High risk medications that require intensive monitoring: Linezolid   Reviewed repeat CXR  Reviewed labs     I have personally seen and examined this patient.    I have reviewed all pertinent clinical information and reviewed all relevant imaging and diagnostic studies personally.   I counseled the patient about diagnostic testing and treatment plan. All questions were answered.   I discussed recommendations with the primary team.

## 2024-07-05 NOTE — PROGRESS NOTE ADULT - SUBJECTIVE AND OBJECTIVE BOX
JERICHO TEA  58y  [facility] 013 [address]      Patient is a 58y old  Female who presents with a chief complaint of Sepsis (05 Jul 2024 10:57)      My note supersedes the resident's note      INTERVAL HPI/OVERNIGHT EVENTS:    no acute events overnight     REVIEW OF SYSTEMS:  Unable to ROS due to MS     T(C): 36.4 (07-05-24 @ 05:12), Max: 37.2 (07-04-24 @ 12:26)  HR: 104 (07-05-24 @ 05:12) (104 - 110)  BP: 102/59 (07-05-24 @ 05:12) (101/61 - 111/73)  RR: 19 (07-05-24 @ 05:12) (18 - 19)  SpO2: 97% (07-04-24 @ 21:05) (97% - 97%)  Wt(kg): --Vital Signs Last 24 Hrs  T(C): 36.4 (05 Jul 2024 05:12), Max: 37.2 (04 Jul 2024 12:26)  T(F): 97.6 (05 Jul 2024 05:12), Max: 98.9 (04 Jul 2024 12:26)  HR: 104 (05 Jul 2024 05:12) (104 - 110)  BP: 102/59 (05 Jul 2024 05:12) (101/61 - 111/73)  BP(mean): --  RR: 19 (05 Jul 2024 05:12) (18 - 19)  SpO2: 97% (04 Jul 2024 21:05) (97% - 97%)    Parameters below as of 04 Jul 2024 21:05  Patient On (Oxygen Delivery Method): nasal cannula  O2 Flow (L/min): 4      PHYSICAL EXAM:  GENERAL: NAD,   HEAD:  Atraumatic, Normocephalic  EYES: EOMI, PERRLA, conjunctiva and sclera clear  ENMT: No tonsillar erythema, exudates, or enlargement;   NECK: Supple, No JVD, Normal thyroid  NERVOUS SYSTEM:  Alert   PULM: Clear to auscultation bilaterally  CARDIAC: Regular rate and rhythm; No murmurs, rubs, or gallops  GI: Soft, Nontender, Nondistended; Bowel sounds present + G tube   EXTREMITIES: contracted   LYMPH: No lymphadenopathy noted  SKIN: No rashes or lesions    Consultant(s) Notes Reviewed:  [x ] YES  [ ] NO  Care Discussed with Consultants/Other Providers [ x] YES  [ ] NO    LABS:      07-04    139  |  99  |  11  ----------------------------<  100<H>  4.8   |  30  |  0.5<L>    Ca    8.9      04 Jul 2024 07:27              acetaminophen     Tablet .. 650 milliGRAM(s) Oral every 6 hours PRN  albuterol/ipratropium for Nebulization 3 milliLiter(s) Nebulizer every 6 hours  aluminum hydroxide/magnesium hydroxide/simethicone Suspension 30 milliLiter(s) Oral every 4 hours PRN  bacitracin   Ointment 1 Application(s) Topical every 8 hours  calcium carbonate   1250 mG (OsCal) 1 Tablet(s) Oral daily  collagenase Ointment 1 Application(s) Topical two times a day  enoxaparin Injectable 30 milliGRAM(s) SubCutaneous every 24 hours  gabapentin 300 milliGRAM(s) Oral two times a day  levETIRAcetam  Solution 750 milliGRAM(s) Oral two times a day  linezolid  IVPB 600 milliGRAM(s) IV Intermittent every 12 hours  melatonin 3 milliGRAM(s) Oral at bedtime  meropenem  IVPB 1000 milliGRAM(s) IV Intermittent every 8 hours  meropenem  IVPB      midodrine 10 milliGRAM(s) Oral every 8 hours  ondansetron Injectable 4 milliGRAM(s) IV Push every 8 hours PRN  pantoprazole   Suspension 40 milliGRAM(s) Enteral Tube daily  raloxifene 60 milliGRAM(s) Oral daily  sodium chloride 3%  Inhalation 4 milliLiter(s) Inhalation two times a day      HEALTH ISSUES - PROBLEM Dx:  Sepsis    Acute respiratory failure with hypoxia    Advanced care planning/counseling discussion    Encounter for palliative care            Case Discussed with House Staff   Spectra [phone]

## 2024-07-05 NOTE — PROGRESS NOTE ADULT - ASSESSMENT
ASSESSMENT  59 yo F with hx of down syndrome, anemia, cerebral palsy, seizure disorder, osteoporosis, hypotension on midodrine, nonverbal at baseline who was BIBEMS from group home for respiratory distress with complaints of fever and hypoxia.     IMPRESSION  #Septic Shock  #Acute Hypoxemic Respiratory failure   #Severe bacterial pneumonia, possible GN pneumonia   #Cerebral Palsy  #seizure Disorder    #DM   #Abx allergy:     RECOMMENDATIONS  - reviewed CXR -- noted improvement -- can stop antibiotics after today's dose to complete 10 days   - noted to be diaphoretic/shaking -- tachycardic, although afebrile -- continue to monitor for fevers    Please call or message on [employer] Teams if with any questions.  Spectra [phone]

## 2024-07-05 NOTE — PROGRESS NOTE ADULT - ASSESSMENT
58F with PMH including CP, anemia, Down's syndrome, seizures, OP, hypotension, nonverbal at at baseline, here with respiratory distress, and found to have acute hypoxic respiratory failure from PNA, possibly from aspiration, admitted to medicine stepdown on pressors and IV abx.    #Acute Hypoxemic Respiratory Failure due to suspected Aspiration PNA, recurrent  completing antibiotics today    plan for PEJ , dependant on GI discussion with guardian   titrate oxygen as tolerated     #Tachycardia - suspect secondary to nebulizer, check before nebulizer treatment and 12 lead ekg     #Hx Bacteremia with ESBL Proteus and VR E fecalis and fecium.    # Cerebral Palsy/ Down syndrome/ Nonverbal at baseline     #Elevated lactate resolved     #Seizure  Keppra     #Hx of duodenal perforation     #Foot pressure ulcers -  podiatry eval appreciated , local care     #intermittent tachycardia    #Overall prognosis is poor, high risk of decompensation    PROGRESS NOTE HANDOFF    Pending:     Family discussion: staff at bedside aware of plan of care     Disposition: Home

## 2024-07-05 NOTE — PROGRESS NOTE ADULT - SUBJECTIVE AND OBJECTIVE BOX
TEA ECHAVARRIA  58y, Female  Allergy: chlorhexidine containing compounds (Rash (Mild to Mod))      LOS  9d    CHIEF COMPLAINT: Sepsis (05 Jul 2024 10:50)      INTERVAL EVENTS/HPI  - No acute events overnight  - T(F): , Max: 98.9 (07-04-24 @ 12:26)  - no fevers - but UE shaking today and diaphoretic   - WBC Count: 6.82 (07-03-24 @ 07:08)     - Creatinine: 0.5 (07-04-24 @ 07:27)       ROS  unable to obtain history secondary to patient's mental status and/or sedation      VITALS:  T(F): 97.6, Max: 98.9 (07-04-24 @ 12:26)  HR: 104  BP: 102/59  RR: 19Vital Signs Last 24 Hrs  T(C): 36.4 (05 Jul 2024 05:12), Max: 37.2 (04 Jul 2024 12:26)  T(F): 97.6 (05 Jul 2024 05:12), Max: 98.9 (04 Jul 2024 12:26)  HR: 104 (05 Jul 2024 05:12) (104 - 110)  BP: 102/59 (05 Jul 2024 05:12) (101/61 - 111/73)  BP(mean): --  RR: 19 (05 Jul 2024 05:12) (18 - 19)  SpO2: 97% (04 Jul 2024 21:05) (97% - 97%)    Parameters below as of 04 Jul 2024 21:05  Patient On (Oxygen Delivery Method): nasal cannula  O2 Flow (L/min): 4      PHYSICAL EXAM:  Gen: NAD  HEENT: Normocephalic, atraumatic  Neck: supple, no lymphadenopathy  CV: Regular rate & regular rhythm  Lungs: decreased BS at bases, no fremitus  Abdomen: Soft, BS present  Ext: Warm, well perfused  Neuro: non focal, awake  Skin: no rash, no erythema  Lines: no phlebitis    FH: Non-contributory  Social Hx: Non-contributory    TESTS & MEASUREMENTS:    07-04    139  |  99  |  11  ----------------------------<  100<H>  4.8   |  30  |  0.5<L>    Ca    8.9      04 Jul 2024 07:27          Urinalysis Basic - ( 04 Jul 2024 07:27 )    Color: x / Appearance: x / SG: x / pH: x  Gluc: 100 mg/dL / Ketone: x  / Bili: x / Urobili: x   Blood: x / Protein: x / Nitrite: x   Leuk Esterase: x / RBC: x / WBC x   Sq Epi: x / Non Sq Epi: x / Bacteria: x        Culture - Blood (collected 06-29-24 @ 17:33)  Source: .Blood None  Final Report (07-05-24 @ 02:00):    No growth at 5 days    Urinalysis with Rflx Culture (collected 06-26-24 @ 22:10)    Culture - Urine (collected 06-26-24 @ 22:10)  Source: Catheterized None  Final Report (06-28-24 @ 21:21):    <10,000 CFU/mL Normal Urogenital Mili    Culture - Blood (collected 06-26-24 @ 19:14)  Source: .Blood Blood-Peripheral  Final Report (07-01-24 @ 23:00):    No growth at 5 days    Culture - Blood (collected 06-26-24 @ 19:14)  Source: .Blood Blood-Peripheral  Final Report (07-01-24 @ 23:00):    No growth at 5 days            INFECTIOUS DISEASES TESTING  Procalcitonin: 0.60 (06-28-24 @ 11:00)  Procalcitonin: 0.11 (06-12-24 @ 11:58)  MRSA PCR Result.: Positive (06-01-24 @ 13:02)  Procalcitonin: 0.28 (06-01-24 @ 11:14)  Procalcitonin: 0.62 (06-01-24 @ 04:17)  Rapid RVP Result: NotDetec (06-01-24 @ 00:15)  Procalcitonin: 0.59 (05-06-24 @ 10:50)  Procalcitonin: 0.16 (05-03-24 @ 18:01)  Aspergillus Galactomannan Antigen: 0.03 (05-03-24 @ 18:01)  Fungitell: 86 (05-03-24 @ 18:00)  Fungitell: 53 (04-25-24 @ 21:33)  Fungitell: 53 (04-24-24 @ 05:57)  MRSA PCR Result.: Negative (04-16-24 @ 14:39)  Rapid RVP Result: NotDetec (04-16-24 @ 14:39)  Procalcitonin, Serum: 6.93 (04-16-24 @ 07:03)  MRSA PCR Result.: Negative (03-16-24 @ 23:45)  Fungitell: 81 (03-13-24 @ 17:24)  Procalcitonin, Serum: 0.19 (03-13-24 @ 07:08)  Fungitell: 73 (02-23-24 @ 18:19)  MRSA PCR Result.: Negative (02-20-24 @ 13:42)  Fungitell: 76 (02-19-24 @ 16:00)  Procalcitonin, Serum: 0.16 (02-19-24 @ 16:00)  Procalcitonin, Serum: 0.20 (02-19-24 @ 11:23)  Rapid RVP Result: NotDetec (02-17-24 @ 19:06)  Procalcitonin, Serum: 0.11 (02-17-24 @ 10:41)  MRSA PCR Result.: Negative (02-15-24 @ 06:20)  Procalcitonin, Serum: 0.10 (02-12-24 @ 11:17)  Rapid RVP Result: NotDetec (02-12-24 @ 10:28)  MRSA PCR Result.: Negative (02-12-24 @ 10:28)  Fungitell: 63 (02-06-24 @ 11:27)  Fungitell: 65 (02-06-24 @ 04:43)  Rapid RVP Result: NotDetec (02-04-24 @ 10:28)  MRSA PCR Result.: Negative (02-03-24 @ 21:32)  Procalcitonin, Serum: 0.15 (02-03-24 @ 11:13)  Procalcitonin, Serum: 0.22 (02-01-24 @ 16:00)  MRSA PCR Result.: Negative (01-22-24 @ 05:30)  Legionella Antigen, Urine: Negative (01-21-24 @ 05:00)  Rapid RVP Result: Detected (01-21-24 @ 00:58)  Procalcitonin, Serum: 0.51 (01-20-24 @ 21:23)  Fungitell: 325 (01-20-24 @ 21:23)  Fungitell: 138 (11-07-23 @ 08:08)  Fungitell: 115 (11-02-23 @ 11:40)  Procalcitonin, Serum: 0.04 (11-02-23 @ 11:40)  Procalcitonin, Serum: 0.26 (10-24-23 @ 11:00)  MRSA PCR Result.: Negative (10-17-23 @ 17:20)  Fungitell: >500 (10-17-23 @ 17:20)  Procalcitonin, Serum: 4.36 (10-17-23 @ 17:20)  Procalcitonin, Serum: 4.18 (10-17-23 @ 12:35)  Procalcitonin, Serum: 0.07 (10-15-23 @ 07:28)  COVID-19 PCR: NotDetec (10-12-23 @ 09:14)  Procalcitonin, Serum: 0.40 (10-07-23 @ 10:54)  Legionella Antigen, Urine: Negative (09-30-23 @ 14:36)  MRSA PCR Result.: Negative (09-29-23 @ 16:50)  Procalcitonin, Serum: 9.78 (08-12-23 @ 11:25)  MRSA PCR Result.: Negative (08-12-23 @ 06:10)  Rapid RVP Result: NotDetec (08-12-23 @ 01:33)  Procalcitonin, Serum: 0.63 (08-10-23 @ 08:46)  Procalcitonin, Serum: 7.82 (08-04-23 @ 16:13)  MRSA PCR Result.: Negative (08-04-23 @ 14:52)  Rapid RVP Result: NotDetec (08-04-23 @ 03:00)      INFLAMMATORY MARKERS  Sedimentation Rate, Erythrocyte: 13 mm/Hr (05-21-24 @ 07:06)  C-Reactive Protein: 31.9 mg/L (05-21-24 @ 07:06)  Sedimentation Rate, Erythrocyte: 100 mm/Hr (02-03-24 @ 11:13)  C-Reactive Protein, Serum: 214.2 mg/L (02-03-24 @ 11:13)      RADIOLOGY & ADDITIONAL TESTS:  I have personally reviewed the last available Chest xray  CXR  Xray Chest 1 View- PORTABLE-Urgent:   ACC: [id number] EXAM:  XR CHEST PORTABLE URGENT 1V   ORDERED BY: LYNN STAUFFER     PROCEDURE DATE:  07/05/2024          INTERPRETATION:  Clinical History / Reason for exam: Pneumonia.    Comparison : Chest radiograph July 2, 2024.    Technique/Positioning: Frontal portable, low lung volumes.    Findings:    Support devices: None.    Cardiac/mediastinum/hilum: Unremarkable.    Lung parenchyma/Pleura: Bilateral opacities    Skeleton/soft tissues: Unchanged    Impression:    Pneumonia. Slight improvement.        --- End of Report ---            FAYE JOHNSON MD; Attending Interventional Radiologist  This document has been electronically signed. Jul 5 2024 10:35AM (07-05-24 @ 10:28)      CT      CARDIOLOGY TESTING  12 Lead ECG:   Ventricular Rate 140 BPM    Atrial Rate 67 BPM    P-R Interval 154 ms    QRS Duration 68 ms    Q-T Interval 286 ms    QTC Calculation(Bazett) 436 ms    P Axis 104 degrees    R Axis 97 degrees    T Axis 67 degrees    Diagnosis Line *** Poor data quality, interpretation may be adversely affected  Baseline artifact  Undetermined rhythm  Rightward axis  Low voltage QRS  Abnormal ECG    Confirmed by Justice Stockton ([id number]) on 6/27/2024 8:53:18 PM (06-27-24 @ 18:02)  12 Lead ECG:   Ventricular Rate 106 BPM    Atrial Rate 106 BPM    P-R Interval 118 ms    QRS Duration 72 ms    Q-T Interval 346 ms    QTC Calculation(Bazett) 459 ms    P Axis 55 degrees    R Axis 60 degrees    T Axis 49 degrees    Diagnosis Line Sinus tachycardia  Otherwise normal ECG    Confirmed by MARJAN MENDES MD ([id number]) on 6/27/2024 6:41:47 AM (06-27-24 @ 04:52)      MEDICATIONS  albuterol/ipratropium for Nebulization 3 Nebulizer every 6 hours  bacitracin   Ointment 1 Topical every 8 hours  calcium carbonate   1250 mG (OsCal) 1 Oral daily  collagenase Ointment 1 Topical two times a day  enoxaparin Injectable 30 SubCutaneous every 24 hours  gabapentin 300 Oral two times a day  levETIRAcetam  Solution 750 Oral two times a day  linezolid  IVPB 600 IV Intermittent every 12 hours  melatonin 3 Oral at bedtime  meropenem  IVPB     meropenem  IVPB 1000 IV Intermittent every 8 hours  midodrine 10 Oral every 8 hours  pantoprazole   Suspension 40 Enteral Tube daily  raloxifene 60 Oral daily  sodium chloride 3%  Inhalation 4 Inhalation two times a day      WEIGHT  Weight (kg): 41 (06-26-24 @ 19:01)      ANTIBIOTICS:  linezolid  IVPB 600 milliGRAM(s) IV Intermittent every 12 hours  meropenem  IVPB      meropenem  IVPB 1000 milliGRAM(s) IV Intermittent every 8 hours      All available historical records have been reviewed

## 2024-07-06 LAB
ANION GAP SERPL CALC-SCNC: 12 MMOL/L — SIGNIFICANT CHANGE UP (ref 7–14)
BASOPHILS # BLD AUTO: 0.07 K/UL — SIGNIFICANT CHANGE UP (ref 0–0.2)
BASOPHILS NFR BLD AUTO: 1.3 % — HIGH (ref 0–1)
BUN SERPL-MCNC: 16 MG/DL — SIGNIFICANT CHANGE UP (ref 10–20)
CALCIUM SERPL-MCNC: 8.8 MG/DL — SIGNIFICANT CHANGE UP (ref 8.4–10.4)
CHLORIDE SERPL-SCNC: 100 MMOL/L — SIGNIFICANT CHANGE UP (ref 98–110)
CO2 SERPL-SCNC: 27 MMOL/L — SIGNIFICANT CHANGE UP (ref 17–32)
CREAT SERPL-MCNC: 0.5 MG/DL — LOW (ref 0.7–1.5)
EGFR: 109 ML/MIN/1.73M2 — SIGNIFICANT CHANGE UP
EOSINOPHIL # BLD AUTO: 0.17 K/UL — SIGNIFICANT CHANGE UP (ref 0–0.7)
EOSINOPHIL NFR BLD AUTO: 3.1 % — SIGNIFICANT CHANGE UP (ref 0–8)
GLUCOSE SERPL-MCNC: 106 MG/DL — HIGH (ref 70–99)
HCT VFR BLD CALC: 31 % — LOW (ref 37–47)
HGB BLD-MCNC: 9.5 G/DL — LOW (ref 12–16)
IMM GRANULOCYTES NFR BLD AUTO: 0.4 % — HIGH (ref 0.1–0.3)
LYMPHOCYTES # BLD AUTO: 1.35 K/UL — SIGNIFICANT CHANGE UP (ref 1.2–3.4)
LYMPHOCYTES # BLD AUTO: 24.7 % — SIGNIFICANT CHANGE UP (ref 20.5–51.1)
MAGNESIUM SERPL-MCNC: 2.5 MG/DL — HIGH (ref 1.8–2.4)
MCHC RBC-ENTMCNC: 29.7 PG — SIGNIFICANT CHANGE UP (ref 27–31)
MCHC RBC-ENTMCNC: 30.6 G/DL — LOW (ref 32–37)
MCV RBC AUTO: 96.9 FL — SIGNIFICANT CHANGE UP (ref 81–99)
MONOCYTES # BLD AUTO: 0.41 K/UL — SIGNIFICANT CHANGE UP (ref 0.1–0.6)
MONOCYTES NFR BLD AUTO: 7.5 % — SIGNIFICANT CHANGE UP (ref 1.7–9.3)
NEUTROPHILS # BLD AUTO: 3.45 K/UL — SIGNIFICANT CHANGE UP (ref 1.4–6.5)
NEUTROPHILS NFR BLD AUTO: 63 % — SIGNIFICANT CHANGE UP (ref 42.2–75.2)
NRBC # BLD: 0 /100 WBCS — SIGNIFICANT CHANGE UP (ref 0–0)
PLATELET # BLD AUTO: 420 K/UL — HIGH (ref 130–400)
PMV BLD: 10.1 FL — SIGNIFICANT CHANGE UP (ref 7.4–10.4)
POTASSIUM SERPL-MCNC: 5.1 MMOL/L — HIGH (ref 3.5–5)
POTASSIUM SERPL-SCNC: 5.1 MMOL/L — HIGH (ref 3.5–5)
RBC # BLD: 3.2 M/UL — LOW (ref 4.2–5.4)
RBC # FLD: 20.4 % — HIGH (ref 11.5–14.5)
SODIUM SERPL-SCNC: 139 MMOL/L — SIGNIFICANT CHANGE UP (ref 135–146)
WBC # BLD: 5.47 K/UL — SIGNIFICANT CHANGE UP (ref 4.8–10.8)
WBC # FLD AUTO: 5.47 K/UL — SIGNIFICANT CHANGE UP (ref 4.8–10.8)

## 2024-07-06 PROCEDURE — 99232 SBSQ HOSP IP/OBS MODERATE 35: CPT

## 2024-07-06 RX ADMIN — Medication 1 APPLICATION(S): at 05:15

## 2024-07-06 RX ADMIN — IPRATROPIUM BROMIDE AND ALBUTEROL SULFATE 3 MILLILITER(S): .5; 3 SOLUTION RESPIRATORY (INHALATION) at 20:08

## 2024-07-06 RX ADMIN — Medication 4 MILLILITER(S): at 07:35

## 2024-07-06 RX ADMIN — LEVETIRACETAM 750 MILLIGRAM(S): 100 INJECTION INTRAVENOUS at 18:18

## 2024-07-06 RX ADMIN — IPRATROPIUM BROMIDE AND ALBUTEROL SULFATE 3 MILLILITER(S): .5; 3 SOLUTION RESPIRATORY (INHALATION) at 07:35

## 2024-07-06 RX ADMIN — MIDODRINE HYDROCHLORIDE 10 MILLIGRAM(S): 10 TABLET ORAL at 13:39

## 2024-07-06 RX ADMIN — RALOXIFENE HYDROCHLORIDE 60 MILLIGRAM(S): 60 TABLET, FILM COATED ORAL at 12:26

## 2024-07-06 RX ADMIN — Medication 1 APPLICATION(S): at 21:59

## 2024-07-06 RX ADMIN — PANTOPRAZOLE SODIUM 40 MILLIGRAM(S): 40 INJECTION, POWDER, FOR SOLUTION INTRAVENOUS at 12:27

## 2024-07-06 RX ADMIN — MIDODRINE HYDROCHLORIDE 10 MILLIGRAM(S): 10 TABLET ORAL at 21:59

## 2024-07-06 RX ADMIN — Medication 3 MILLIGRAM(S): at 21:59

## 2024-07-06 RX ADMIN — Medication 4 MILLILITER(S): at 20:10

## 2024-07-06 RX ADMIN — MIDODRINE HYDROCHLORIDE 10 MILLIGRAM(S): 10 TABLET ORAL at 05:14

## 2024-07-06 RX ADMIN — IPRATROPIUM BROMIDE AND ALBUTEROL SULFATE 3 MILLILITER(S): .5; 3 SOLUTION RESPIRATORY (INHALATION) at 13:27

## 2024-07-06 RX ADMIN — Medication 1 TABLET(S): at 12:27

## 2024-07-06 RX ADMIN — Medication 300 MILLIGRAM(S): at 18:18

## 2024-07-06 RX ADMIN — Medication 300 MILLIGRAM(S): at 05:14

## 2024-07-06 RX ADMIN — Medication 1 APPLICATION(S): at 13:39

## 2024-07-06 RX ADMIN — Medication 1 APPLICATION(S): at 18:19

## 2024-07-06 RX ADMIN — ENOXAPARIN SODIUM 30 MILLIGRAM(S): 100 INJECTION SUBCUTANEOUS at 12:27

## 2024-07-06 RX ADMIN — LEVETIRACETAM 750 MILLIGRAM(S): 100 INJECTION INTRAVENOUS at 05:14

## 2024-07-06 RX ADMIN — Medication 1 APPLICATION(S): at 05:14

## 2024-07-06 NOTE — PROGRESS NOTE ADULT - SUBJECTIVE AND OBJECTIVE BOX
TEA ECHAVARRIA  58y  [facility] 4A 013 B      Patient is a 58y old  Female who presents with a chief complaint of Sepsis (06 Jul 2024 09:36)      My note supersedes the resident's note      INTERVAL HPI/OVERNIGHT EVENTS:        REVIEW OF SYSTEMS:  CONSTITUTIONAL: No fever, weight loss, or fatigue  EYES: No eye pain, visual disturbances, or discharge  ENMT:  No difficulty hearing, tinnitus, vertigo; No sinus or throat pain  NECK: No pain or stiffness  BREASTS: No pain, masses, or nipple discharge  RESPIRATORY: No cough, wheezing, chills or hemoptysis; No shortness of breath  CARDIOVASCULAR: No chest pain, palpitations, dizziness, or leg swelling  GASTROINTESTINAL: No abdominal or epigastric pain. No nausea, vomiting, or hematemesis; No diarrhea or constipation. No melena or hematochezia.  GENITOURINARY: No dysuria, frequency, hematuria, or incontinence  NEUROLOGICAL: No headaches, memory loss, loss of strength, numbness, or tremors  SKIN: No itching, burning, rashes, or lesions   LYMPH NODES: No enlarged glands  ENDOCRINE: No heat or cold intolerance; No hair loss  MUSCULOSKELETAL: No joint pain or swelling; No muscle, back, or extremity pain  PSYCHIATRIC: No depression, anxiety, mood swings, or difficulty sleeping  HEME/LYMPH: No easy bruising, or bleeding gums  ALLERY AND IMMUNOLOGIC: No hives or eczema  FAMILY HISTORY:    T(C): 36.2 (07-06-24 @ 07:06), Max: 37 (07-05-24 @ 14:14)  HR: 68 (07-06-24 @ 07:06) (68 - 69)  BP: 98/62 (07-06-24 @ 07:06) (94/51 - 98/62)  RR: 18 (07-06-24 @ 07:06) (18 - 18)  SpO2: --  Wt(kg): --Vital Signs Last 24 Hrs  T(C): 36.2 (06 Jul 2024 07:06), Max: 37 (05 Jul 2024 14:14)  T(F): 97.2 (06 Jul 2024 07:06), Max: 98.6 (05 Jul 2024 14:14)  HR: 68 (06 Jul 2024 07:06) (68 - 69)  BP: 98/62 (06 Jul 2024 07:06) (94/51 - 98/62)  BP(mean): --  RR: 18 (06 Jul 2024 07:06) (18 - 18)  SpO2: --        PHYSICAL EXAM:  GENERAL: NAD,   HEAD:  Atraumatic, Normocephalic  EYES: EOMI, PERRLA, conjunctiva and sclera clear  ENMT: No tonsillar erythema, exudates, or enlargement; Moist mucous membranes, Good dentition, No lesions  NECK: Supple, No JVD, Normal thyroid  NERVOUS SYSTEM:  Alert & Oriented X3, Good concentration; Motor Strength 5/5 B/L upper and lower extremities; DTRs 2+ intact and symmetric  PULM: Clear to auscultation bilaterally  CARDIAC: Regular rate and rhythm; No murmurs, rubs, or gallops  GI: Soft, Nontender, Nondistended; Bowel sounds present  EXTREMITIES:  2+ Peripheral Pulses, No clubbing, cyanosis, or edema  LYMPH: No lymphadenopathy noted  SKIN: No rashes or lesions    Consultant(s) Notes Reviewed:  [x ] YES  [ ] NO  Care Discussed with Consultants/Other Providers [ x] YES  [ ] NO    LABS:                            9.5    5.47  )-----------( 420      ( 06 Jul 2024 06:02 )             31.0   07-06    139  |  100  |  16  ----------------------------<  106<H>  5.1<H>   |  27  |  0.5<L>    Ca    8.8      06 Jul 2024 06:02  Mg     2.5     07-06              acetaminophen     Tablet .. 650 milliGRAM(s) Oral every 6 hours PRN  albuterol/ipratropium for Nebulization 3 milliLiter(s) Nebulizer every 6 hours  aluminum hydroxide/magnesium hydroxide/simethicone Suspension 30 milliLiter(s) Oral every 4 hours PRN  bacitracin   Ointment 1 Application(s) Topical every 8 hours  calcium carbonate   1250 mG (OsCal) 1 Tablet(s) Oral daily  collagenase Ointment 1 Application(s) Topical two times a day  enoxaparin Injectable 30 milliGRAM(s) SubCutaneous every 24 hours  gabapentin 300 milliGRAM(s) Oral two times a day  levETIRAcetam  Solution 750 milliGRAM(s) Oral two times a day  melatonin 3 milliGRAM(s) Oral at bedtime  midodrine 10 milliGRAM(s) Oral every 8 hours  ondansetron Injectable 4 milliGRAM(s) IV Push every 8 hours PRN  pantoprazole   Suspension 40 milliGRAM(s) Enteral Tube daily  raloxifene 60 milliGRAM(s) Oral daily  sodium chloride 3%  Inhalation 4 milliLiter(s) Inhalation two times a day      HEALTH ISSUES - PROBLEM Dx:  Sepsis    Acute respiratory failure with hypoxia    Advanced care planning/counseling discussion    Encounter for palliative care            Case Discussed with House Staff   45 minutes spent on total encounter; more than 50% of the visit was spent counseling and/or coordinating care by the attending physician.    Spectra [phone]     JERICHO TEA  58y  [facility]      Patient is a 58y old  Female who presents with a chief complaint of Sepsis (06 Jul 2024 09:36)      My note supersedes the resident's note      INTERVAL HPI/OVERNIGHT EVENTS:  no acute events overnight       REVIEW OF SYSTEMS:  Unable to ROS due to MS   FAMILY HISTORY:    T(C): 36.2 (07-06-24 @ 07:06), Max: 37 (07-05-24 @ 14:14)  HR: 68 (07-06-24 @ 07:06) (68 - 69)  BP: 98/62 (07-06-24 @ 07:06) (94/51 - 98/62)  RR: 18 (07-06-24 @ 07:06) (18 - 18)  SpO2: --  Wt(kg): --Vital Signs Last 24 Hrs  T(C): 36.2 (06 Jul 2024 07:06), Max: 37 (05 Jul 2024 14:14)  T(F): 97.2 (06 Jul 2024 07:06), Max: 98.6 (05 Jul 2024 14:14)  HR: 68 (06 Jul 2024 07:06) (68 - 69)  BP: 98/62 (06 Jul 2024 07:06) (94/51 - 98/62)  BP(mean): --  RR: 18 (06 Jul 2024 07:06) (18 - 18)  SpO2: --        PHYSICAL EXAM:  GENERAL: NAD,   HEAD:  Atraumatic, Normocephalic  EYES: EOMI, PERRLA, conjunctiva and sclera clear  ENMT: No tonsillar erythema, exudates, or enlargement; Moist mucous membranes, Good dentition, No lesions  NECK: Supple, No JVD, Normal thyroid  PULM: Clear to auscultation bilaterally  CARDIAC: Regular rate and rhythm; No murmurs, rubs, or gallops  GI: Soft, Nontender, Nondistended; Bowel sounds present + G tube   EXTREMITIES:  contracted   LYMPH: No lymphadenopathy noted  SKIN: No rashes or lesions    Consultant(s) Notes Reviewed:  [x ] YES  [ ] NO  Care Discussed with Consultants/Other Providers [ x] YES  [ ] NO    LABS:                            9.5    5.47  )-----------( 420      ( 06 Jul 2024 06:02 )             31.0   07-06    139  |  100  |  16  ----------------------------<  106<H>  5.1<H>   |  27  |  0.5<L>    Ca    8.8      06 Jul 2024 06:02  Mg     2.5     07-06              acetaminophen     Tablet .. 650 milliGRAM(s) Oral every 6 hours PRN  albuterol/ipratropium for Nebulization 3 milliLiter(s) Nebulizer every 6 hours  aluminum hydroxide/magnesium hydroxide/simethicone Suspension 30 milliLiter(s) Oral every 4 hours PRN  bacitracin   Ointment 1 Application(s) Topical every 8 hours  calcium carbonate   1250 mG (OsCal) 1 Tablet(s) Oral daily  collagenase Ointment 1 Application(s) Topical two times a day  enoxaparin Injectable 30 milliGRAM(s) SubCutaneous every 24 hours  gabapentin 300 milliGRAM(s) Oral two times a day  levETIRAcetam  Solution 750 milliGRAM(s) Oral two times a day  melatonin 3 milliGRAM(s) Oral at bedtime  midodrine 10 milliGRAM(s) Oral every 8 hours  ondansetron Injectable 4 milliGRAM(s) IV Push every 8 hours PRN  pantoprazole   Suspension 40 milliGRAM(s) Enteral Tube daily  raloxifene 60 milliGRAM(s) Oral daily  sodium chloride 3%  Inhalation 4 milliLiter(s) Inhalation two times a day      HEALTH ISSUES - PROBLEM Dx:  Sepsis    Acute respiratory failure with hypoxia    Advanced care planning/counseling discussion    Encounter for palliative care            Case Discussed with House Staff      Spectra [phone]

## 2024-07-06 NOTE — PROGRESS NOTE ADULT - ASSESSMENT
58F with PMH including CP, anemia, Down's syndrome, seizures, OP, hypotension, nonverbal at at baseline, here with respiratory distress, and found to have acute hypoxic respiratory failure from PNA, possibly from aspiration, admitted to medicine stepdown on pressors and IV abx.    #Acute Hypoxemic Respiratory Failure due to suspected Aspiration PNA, recurrent  #Septic shock (POA)  #Hx Bacteremia with ESBL Proteus and VR E fecalis and fecium  #Hx of Cerebral Palsy/ Down syndrome/ Nonverbal at baseline  - Last Lactate 1.5 (29/6) down from 2.7, 3.9, 5.4.   - CTA chest (6/27/24):  a) reported Diffuse, near complete left lower lobe and upper lobe mixed consolidation and atelectasis, with some remaining aeration to left upper lobe.                        b) Occlusion of right mainstem bronchus; correlation for aspiration suggested.                        c) Near complete resolution of right lung airspace opacification, with  few residual likely inflammatory/infectious ground-glass airspace opacities.                        d) No evidence of acute pulmonary embolism.  - Weaned down from venturi mask (7/1/24), currently on high flow nasal cannula 4L (7/3/24). Plan to wean down to 3L today (7/4/24)  - Midodrine 10mg q8hrs  - so far, Ucx and blood cx NGTD, procal 0.6  - appreciate ID eval, cw meropenem and Zyvox (switched to PO, per ID recommendations 7/1/24), d/c doxycycline. Last dose scheduled for 7/5/24  - Chest PT via vest, nebs, daily chest xrays.  - strict aspiration precautions, resume PEG feeds   - palliative team following: patient is already DNR/DNI, MOLST checklist completed on prior admission with CAB and [facility]  - GI consulted to consider G_Jtube, they do not recommend it at this time as no benefit with aspiration.   - Pt need consent from CAB for PEJ. Following up with , once consent is received reach out to GI so they can plan the procedure.   - 7/6/24 bladder scans ~600, straight cath overnight. f/u bladder scans    #Seizure   - c/w Keppra     #Hx of duodenal perforation  - Continue PPI     #Pressure ulcers, bilaterally  - Dressing changed this AM 7/3/24  - Wound care consulted, appreciate recs  - Podiatry consulted, appreciate recs    Overall prognosis is poor, high risk of decompensation  Pending: monitor BP, c/w IV abx, resume tube feeds, GI consult, oxygen weaning

## 2024-07-06 NOTE — PROGRESS NOTE ADULT - SUBJECTIVE AND OBJECTIVE BOX
JERICHOTEA  58y  Female    Reason for Admission:   OVERNIGHT/INTERIM: Pt seen and examined at bedside during AM rounds.      Vital Signs Last 24 Hrs  T(C): 36.2 (06 Jul 2024 07:06), Max: 37 (05 Jul 2024 14:14)  T(F): 97.2 (06 Jul 2024 07:06), Max: 98.6 (05 Jul 2024 14:14)  HR: 68 (06 Jul 2024 07:06) (68 - 69)  BP: 98/62 (06 Jul 2024 07:06) (94/51 - 98/62)  BP(mean): --  RR: 18 (06 Jul 2024 07:06) (18 - 18)  SpO2: --        PHYSICAL EXAM:  General: NAD, alert   ENT: MMM  Neck: Supple, No JVD  Lungs: diminished bilaterally, no crackles   Cardio: RRR, S1/S2, No murmurs  Abdomen: Soft, Nontender, Nondistended; Bowel sounds present  Extremities: No cyanosis, No edema, muscle atrophy  NEUROLOGIC:  does not follow commands   PSYCH: lethargic, nonverbal.    LABS:      LABS:                          9.5    5.47  )-----------( 420      ( 06 Jul 2024 06:02 )             31.0     07-06    139  |  100  |  16  ----------------------------<  106<H>  5.1<H>   |  27  |  0.5<L>    Ca    8.8      06 Jul 2024 06:02  Mg     2.5     07-06      Urinalysis Basic - ( 06 Jul 2024 06:02 )    Color: x / Appearance: x / SG: x / pH: x  Gluc: 106 mg/dL / Ketone: x  / Bili: x / Urobili: x   Blood: x / Protein: x / Nitrite: x   Leuk Esterase: x / RBC: x / WBC x   Sq Epi: x / Non Sq Epi: x / Bacteria: x    MedsMEDICATIONS  (STANDING):  albuterol/ipratropium for Nebulization 3 milliLiter(s) Nebulizer every 6 hours  bacitracin   Ointment 1 Application(s) Topical every 8 hours  calcium carbonate   1250 mG (OsCal) 1 Tablet(s) Oral daily  collagenase Ointment 1 Application(s) Topical two times a day  enoxaparin Injectable 30 milliGRAM(s) SubCutaneous every 24 hours  gabapentin 300 milliGRAM(s) Oral two times a day  levETIRAcetam  Solution 750 milliGRAM(s) Oral two times a day  melatonin 3 milliGRAM(s) Oral at bedtime  midodrine 10 milliGRAM(s) Oral every 8 hours  pantoprazole   Suspension 40 milliGRAM(s) Enteral Tube daily  raloxifene 60 milliGRAM(s) Oral daily  sodium chloride 3%  Inhalation 4 milliLiter(s) Inhalation two times a day    MEDICATIONS  (PRN):  acetaminophen     Tablet .. 650 milliGRAM(s) Oral every 6 hours PRN Temp greater or equal to 38C (100.4F), Mild Pain (1 - 3)  aluminum hydroxide/magnesium hydroxide/simethicone Suspension 30 milliLiter(s) Oral every 4 hours PRN Dyspepsia  ondansetron Injectable 4 milliGRAM(s) IV Push every 8 hours PRN Nausea and/or Vomiting             JERICHO TEA  58y  Female    Reason for Admission:   OVERNIGHT/INTERIM: Pt seen and examined at bedside during AM rounds. Today is hospital day 10. no acute or major events overnight. Last bladder scan ~600, straight cath performed.       Vital Signs Last 24 Hrs  T(C): 36.2 (06 Jul 2024 07:06), Max: 37 (05 Jul 2024 14:14)  T(F): 97.2 (06 Jul 2024 07:06), Max: 98.6 (05 Jul 2024 14:14)  HR: 68 (06 Jul 2024 07:06) (68 - 69)  BP: 98/62 (06 Jul 2024 07:06) (94/51 - 98/62)  BP(mean): --  RR: 18 (06 Jul 2024 07:06) (18 - 18)  SpO2: --        PHYSICAL EXAM:  General: NAD, alert   ENT: MMM  Neck: Supple, No JVD  Lungs: diminished bilaterally, no crackles   Cardio: RRR, S1/S2, No murmurs  Abdomen: Soft, Nontender, Nondistended; Bowel sounds present  Extremities: No cyanosis, No edema, muscle atrophy  NEUROLOGIC:  does not follow commands   PSYCH: lethargic, nonverbal.    LABS:      LABS:                          9.5    5.47  )-----------( 420      ( 06 Jul 2024 06:02 )             31.0     07-06    139  |  100  |  16  ----------------------------<  106<H>  5.1<H>   |  27  |  0.5<L>    Ca    8.8      06 Jul 2024 06:02  Mg     2.5     07-06      Urinalysis Basic - ( 06 Jul 2024 06:02 )    Color: x / Appearance: x / SG: x / pH: x  Gluc: 106 mg/dL / Ketone: x  / Bili: x / Urobili: x   Blood: x / Protein: x / Nitrite: x   Leuk Esterase: x / RBC: x / WBC x   Sq Epi: x / Non Sq Epi: x / Bacteria: x    MedsMEDICATIONS  (STANDING):  albuterol/ipratropium for Nebulization 3 milliLiter(s) Nebulizer every 6 hours  bacitracin   Ointment 1 Application(s) Topical every 8 hours  calcium carbonate   1250 mG (OsCal) 1 Tablet(s) Oral daily  collagenase Ointment 1 Application(s) Topical two times a day  enoxaparin Injectable 30 milliGRAM(s) SubCutaneous every 24 hours  gabapentin 300 milliGRAM(s) Oral two times a day  levETIRAcetam  Solution 750 milliGRAM(s) Oral two times a day  melatonin 3 milliGRAM(s) Oral at bedtime  midodrine 10 milliGRAM(s) Oral every 8 hours  pantoprazole   Suspension 40 milliGRAM(s) Enteral Tube daily  raloxifene 60 milliGRAM(s) Oral daily  sodium chloride 3%  Inhalation 4 milliLiter(s) Inhalation two times a day    MEDICATIONS  (PRN):  acetaminophen     Tablet .. 650 milliGRAM(s) Oral every 6 hours PRN Temp greater or equal to 38C (100.4F), Mild Pain (1 - 3)  aluminum hydroxide/magnesium hydroxide/simethicone Suspension 30 milliLiter(s) Oral every 4 hours PRN Dyspepsia  ondansetron Injectable 4 milliGRAM(s) IV Push every 8 hours PRN Nausea and/or Vomiting

## 2024-07-06 NOTE — PROGRESS NOTE ADULT - ASSESSMENT
58F with PMH including CP, anemia, Down's syndrome, seizures, OP, hypotension, nonverbal at at baseline, here with respiratory distress, and found to have acute hypoxic respiratory failure from PNA, possibly from aspiration, admitted to medicine stepdown on pressors and IV abx.    #Acute Hypoxemic Respiratory Failure due to suspected Aspiration PNA, recurrent  completied antibiotics   plan for PEJ OP , dependant on GI discussion with guardian   titrate oxygen as tolerated     #Hx Bacteremia with ESBL Proteus and VR E fecalis and fecium.    # Cerebral Palsy/ Down syndrome/ Nonverbal at baseline     #Elevated lactate resolved     #Seizure  Keppra     #Hx of duodenal perforation     #Foot pressure ulcers -  podiatry eval appreciated , local care     #intermittent tachycardia    #Overall prognosis is poor, high risk of decompensation    PROGRESS NOTE HANDOFF    Pending: CAB meeting     Family discussion: staff at bedside aware of plan of care     Disposition: Group  Home

## 2024-07-07 PROCEDURE — 99232 SBSQ HOSP IP/OBS MODERATE 35: CPT

## 2024-07-07 RX ADMIN — MIDODRINE HYDROCHLORIDE 10 MILLIGRAM(S): 10 TABLET ORAL at 21:54

## 2024-07-07 RX ADMIN — IPRATROPIUM BROMIDE AND ALBUTEROL SULFATE 3 MILLILITER(S): .5; 3 SOLUTION RESPIRATORY (INHALATION) at 07:45

## 2024-07-07 RX ADMIN — MIDODRINE HYDROCHLORIDE 10 MILLIGRAM(S): 10 TABLET ORAL at 13:03

## 2024-07-07 RX ADMIN — Medication 1 APPLICATION(S): at 06:53

## 2024-07-07 RX ADMIN — RALOXIFENE HYDROCHLORIDE 60 MILLIGRAM(S): 60 TABLET, FILM COATED ORAL at 13:02

## 2024-07-07 RX ADMIN — IPRATROPIUM BROMIDE AND ALBUTEROL SULFATE 3 MILLILITER(S): .5; 3 SOLUTION RESPIRATORY (INHALATION) at 20:01

## 2024-07-07 RX ADMIN — Medication 300 MILLIGRAM(S): at 19:09

## 2024-07-07 RX ADMIN — Medication 1 APPLICATION(S): at 21:54

## 2024-07-07 RX ADMIN — Medication 1 APPLICATION(S): at 19:14

## 2024-07-07 RX ADMIN — MIDODRINE HYDROCHLORIDE 10 MILLIGRAM(S): 10 TABLET ORAL at 06:53

## 2024-07-07 RX ADMIN — Medication 3 MILLIGRAM(S): at 21:54

## 2024-07-07 RX ADMIN — Medication 4 MILLILITER(S): at 08:03

## 2024-07-07 RX ADMIN — LEVETIRACETAM 750 MILLIGRAM(S): 100 INJECTION INTRAVENOUS at 19:08

## 2024-07-07 RX ADMIN — ENOXAPARIN SODIUM 30 MILLIGRAM(S): 100 INJECTION SUBCUTANEOUS at 13:04

## 2024-07-07 RX ADMIN — Medication 300 MILLIGRAM(S): at 06:50

## 2024-07-07 RX ADMIN — LEVETIRACETAM 750 MILLIGRAM(S): 100 INJECTION INTRAVENOUS at 06:50

## 2024-07-07 RX ADMIN — Medication 1 TABLET(S): at 13:02

## 2024-07-07 RX ADMIN — Medication 1 APPLICATION(S): at 14:15

## 2024-07-07 RX ADMIN — IPRATROPIUM BROMIDE AND ALBUTEROL SULFATE 3 MILLILITER(S): .5; 3 SOLUTION RESPIRATORY (INHALATION) at 13:53

## 2024-07-07 RX ADMIN — Medication 4 MILLILITER(S): at 20:01

## 2024-07-07 RX ADMIN — PANTOPRAZOLE SODIUM 40 MILLIGRAM(S): 40 INJECTION, POWDER, FOR SOLUTION INTRAVENOUS at 13:02

## 2024-07-07 NOTE — PROGRESS NOTE ADULT - SUBJECTIVE AND OBJECTIVE BOX
JERICHO TEA  58y  [facility] 4A 013 B      Patient is a 58y old  Female who presents with a chief complaint of Sepsis (06 Jul 2024 10:31)      My note supersedes the resident's note      INTERVAL HPI/OVERNIGHT EVENTS:      no events overnight , awaiting CAB meeting   REVIEW OF SYSTEMS:  ROS neg   FAMILY HISTORY:    T(C): 36.6 (07-07-24 @ 05:27), Max: 37 (07-06-24 @ 21:46)  HR: 99 (07-07-24 @ 05:27) (76 - 125)  BP: 98/58 (07-07-24 @ 05:27) (93/56 - 98/58)  RR: 18 (07-07-24 @ 05:27) (16 - 18)  SpO2: 98% (07-07-24 @ 05:27) (94% - 98%)  Wt(kg): --Vital Signs Last 24 Hrs  T(C): 36.6 (07 Jul 2024 05:27), Max: 37 (06 Jul 2024 21:46)  T(F): 97.8 (07 Jul 2024 05:27), Max: 98.6 (06 Jul 2024 21:46)  HR: 99 (07 Jul 2024 05:27) (76 - 125)  BP: 98/58 (07 Jul 2024 05:27) (93/56 - 98/58)  BP(mean): 73 (06 Jul 2024 14:00) (73 - 73)  RR: 18 (07 Jul 2024 05:27) (16 - 18)  SpO2: 98% (07 Jul 2024 05:27) (94% - 98%)    Parameters below as of 07 Jul 2024 05:27  Patient On (Oxygen Delivery Method): nasal cannula  O2 Flow (L/min): 3      PHYSICAL EXAM:  GENERAL: NAD,   HEAD:  Atraumatic, Normocephalic  EYES: EOMI, PERRLA, conjunctiva and sclera clear  ENMT: No tonsillar erythema, exudates, or enlargement;   NECK: Supple, No JVD, Normal thyroid  NERVOUS SYSTEM:  Awake   PULM: Clear to auscultation bilaterally  CARDIAC: Regular rate and rhythm; No murmurs, rubs, or gallops  GI: + G tube   EXTREMITIES: contracted   LYMPH: No lymphadenopathy noted  SKIN: No rashes or lesions    Consultant(s) Notes Reviewed:  [x ] YES  [ ] NO  Care Discussed with Consultants/Other Providers [ x] YES  [ ] NO    LABS:                            9.5    5.47  )-----------( 420      ( 06 Jul 2024 06:02 )             31.0   07-06    139  |  100  |  16  ----------------------------<  106<H>  5.1<H>   |  27  |  0.5<L>    Ca    8.8      06 Jul 2024 06:02  Mg     2.5     07-06              acetaminophen     Tablet .. 650 milliGRAM(s) Oral every 6 hours PRN  albuterol/ipratropium for Nebulization 3 milliLiter(s) Nebulizer every 6 hours  aluminum hydroxide/magnesium hydroxide/simethicone Suspension 30 milliLiter(s) Oral every 4 hours PRN  bacitracin   Ointment 1 Application(s) Topical every 8 hours  calcium carbonate   1250 mG (OsCal) 1 Tablet(s) Oral daily  collagenase Ointment 1 Application(s) Topical two times a day  enoxaparin Injectable 30 milliGRAM(s) SubCutaneous every 24 hours  gabapentin 300 milliGRAM(s) Oral two times a day  levETIRAcetam  Solution 750 milliGRAM(s) Oral two times a day  melatonin 3 milliGRAM(s) Oral at bedtime  midodrine 10 milliGRAM(s) Oral every 8 hours  ondansetron Injectable 4 milliGRAM(s) IV Push every 8 hours PRN  pantoprazole   Suspension 40 milliGRAM(s) Enteral Tube daily  raloxifene 60 milliGRAM(s) Oral daily  sodium chloride 3%  Inhalation 4 milliLiter(s) Inhalation two times a day      HEALTH ISSUES - PROBLEM Dx:  Sepsis    Acute respiratory failure with hypoxia    Advanced care planning/counseling discussion    Encounter for palliative care            Case Discussed with House Staff     Spectra [phone]

## 2024-07-07 NOTE — PROGRESS NOTE ADULT - ASSESSMENT
58F with PMH including CP, anemia, Down's syndrome, seizures, OP, hypotension, nonverbal at at baseline, here with respiratory distress, and found to have acute hypoxic respiratory failure from PNA, possibly from aspiration, admitted to medicine stepdown on pressors and IV abx.    #Acute Hypoxemic Respiratory Failure due to suspected Aspiration PNA, recurrent  completed antibiotics   plan for PEJ OP , dependant on GI discussion with guardian   titrate oxygen as tolerated     #Hx Bacteremia with ESBL Proteus and VR E fecalis and fecium.    # Cerebral Palsy/ Down syndrome/ Nonverbal at baseline     #Elevated lactate resolved     #Seizure  Keppra     #Hx of duodenal perforation     #Foot pressure ulcers -  podiatry eval appreciated , local care     #intermittent tachycardia    #Overall prognosis is poor, high risk of decompensation    PROGRESS NOTE HANDOFF    Pending: CAB meeting pending     Family discussion: staff at bedside aware of plan of care     Disposition: Group  Home

## 2024-07-08 LAB
ANION GAP SERPL CALC-SCNC: 11 MMOL/L — SIGNIFICANT CHANGE UP (ref 7–14)
BASOPHILS # BLD AUTO: 0.09 K/UL — SIGNIFICANT CHANGE UP (ref 0–0.2)
BASOPHILS NFR BLD AUTO: 1.3 % — HIGH (ref 0–1)
BUN SERPL-MCNC: 20 MG/DL — SIGNIFICANT CHANGE UP (ref 10–20)
CALCIUM SERPL-MCNC: 8.8 MG/DL — SIGNIFICANT CHANGE UP (ref 8.4–10.4)
CHLORIDE SERPL-SCNC: 100 MMOL/L — SIGNIFICANT CHANGE UP (ref 98–110)
CO2 SERPL-SCNC: 26 MMOL/L — SIGNIFICANT CHANGE UP (ref 17–32)
CREAT SERPL-MCNC: 0.5 MG/DL — LOW (ref 0.7–1.5)
EGFR: 109 ML/MIN/1.73M2 — SIGNIFICANT CHANGE UP
EOSINOPHIL # BLD AUTO: 0.13 K/UL — SIGNIFICANT CHANGE UP (ref 0–0.7)
EOSINOPHIL NFR BLD AUTO: 1.9 % — SIGNIFICANT CHANGE UP (ref 0–8)
GLUCOSE SERPL-MCNC: 137 MG/DL — HIGH (ref 70–99)
HCT VFR BLD CALC: 29.7 % — LOW (ref 37–47)
HGB BLD-MCNC: 9.3 G/DL — LOW (ref 12–16)
IMM GRANULOCYTES NFR BLD AUTO: 0.3 % — SIGNIFICANT CHANGE UP (ref 0.1–0.3)
LYMPHOCYTES # BLD AUTO: 1.24 K/UL — SIGNIFICANT CHANGE UP (ref 1.2–3.4)
LYMPHOCYTES # BLD AUTO: 18.2 % — LOW (ref 20.5–51.1)
MAGNESIUM SERPL-MCNC: 2.5 MG/DL — HIGH (ref 1.8–2.4)
MCHC RBC-ENTMCNC: 29.8 PG — SIGNIFICANT CHANGE UP (ref 27–31)
MCHC RBC-ENTMCNC: 31.3 G/DL — LOW (ref 32–37)
MCV RBC AUTO: 95.2 FL — SIGNIFICANT CHANGE UP (ref 81–99)
MONOCYTES # BLD AUTO: 0.5 K/UL — SIGNIFICANT CHANGE UP (ref 0.1–0.6)
MONOCYTES NFR BLD AUTO: 7.3 % — SIGNIFICANT CHANGE UP (ref 1.7–9.3)
NEUTROPHILS # BLD AUTO: 4.84 K/UL — SIGNIFICANT CHANGE UP (ref 1.4–6.5)
NEUTROPHILS NFR BLD AUTO: 71 % — SIGNIFICANT CHANGE UP (ref 42.2–75.2)
NRBC # BLD: 0 /100 WBCS — SIGNIFICANT CHANGE UP (ref 0–0)
PLATELET # BLD AUTO: 404 K/UL — HIGH (ref 130–400)
PMV BLD: 10 FL — SIGNIFICANT CHANGE UP (ref 7.4–10.4)
POTASSIUM SERPL-MCNC: 4.7 MMOL/L — SIGNIFICANT CHANGE UP (ref 3.5–5)
POTASSIUM SERPL-SCNC: 4.7 MMOL/L — SIGNIFICANT CHANGE UP (ref 3.5–5)
RBC # BLD: 3.12 M/UL — LOW (ref 4.2–5.4)
RBC # FLD: 20.5 % — HIGH (ref 11.5–14.5)
SODIUM SERPL-SCNC: 137 MMOL/L — SIGNIFICANT CHANGE UP (ref 135–146)
WBC # BLD: 6.82 K/UL — SIGNIFICANT CHANGE UP (ref 4.8–10.8)
WBC # FLD AUTO: 6.82 K/UL — SIGNIFICANT CHANGE UP (ref 4.8–10.8)

## 2024-07-08 PROCEDURE — 99231 SBSQ HOSP IP/OBS SF/LOW 25: CPT

## 2024-07-08 PROCEDURE — 71045 X-RAY EXAM CHEST 1 VIEW: CPT | Mod: 26

## 2024-07-08 RX ADMIN — LEVETIRACETAM 750 MILLIGRAM(S): 100 INJECTION INTRAVENOUS at 18:34

## 2024-07-08 RX ADMIN — RALOXIFENE HYDROCHLORIDE 60 MILLIGRAM(S): 60 TABLET, FILM COATED ORAL at 13:22

## 2024-07-08 RX ADMIN — LEVETIRACETAM 750 MILLIGRAM(S): 100 INJECTION INTRAVENOUS at 05:47

## 2024-07-08 RX ADMIN — Medication 300 MILLIGRAM(S): at 18:35

## 2024-07-08 RX ADMIN — Medication 300 MILLIGRAM(S): at 05:47

## 2024-07-08 RX ADMIN — Medication 1 APPLICATION(S): at 05:48

## 2024-07-08 RX ADMIN — IPRATROPIUM BROMIDE AND ALBUTEROL SULFATE 3 MILLILITER(S): .5; 3 SOLUTION RESPIRATORY (INHALATION) at 20:12

## 2024-07-08 RX ADMIN — MIDODRINE HYDROCHLORIDE 10 MILLIGRAM(S): 10 TABLET ORAL at 21:29

## 2024-07-08 RX ADMIN — MIDODRINE HYDROCHLORIDE 10 MILLIGRAM(S): 10 TABLET ORAL at 05:47

## 2024-07-08 RX ADMIN — Medication 1 APPLICATION(S): at 13:22

## 2024-07-08 RX ADMIN — MIDODRINE HYDROCHLORIDE 10 MILLIGRAM(S): 10 TABLET ORAL at 13:22

## 2024-07-08 RX ADMIN — IPRATROPIUM BROMIDE AND ALBUTEROL SULFATE 3 MILLILITER(S): .5; 3 SOLUTION RESPIRATORY (INHALATION) at 14:15

## 2024-07-08 RX ADMIN — IPRATROPIUM BROMIDE AND ALBUTEROL SULFATE 3 MILLILITER(S): .5; 3 SOLUTION RESPIRATORY (INHALATION) at 08:20

## 2024-07-08 RX ADMIN — Medication 1 APPLICATION(S): at 18:36

## 2024-07-08 RX ADMIN — Medication 3 MILLIGRAM(S): at 21:24

## 2024-07-08 RX ADMIN — Medication 1 TABLET(S): at 13:22

## 2024-07-08 RX ADMIN — ENOXAPARIN SODIUM 30 MILLIGRAM(S): 100 INJECTION SUBCUTANEOUS at 13:23

## 2024-07-08 RX ADMIN — Medication 1 APPLICATION(S): at 21:24

## 2024-07-08 RX ADMIN — PANTOPRAZOLE SODIUM 40 MILLIGRAM(S): 40 INJECTION, POWDER, FOR SOLUTION INTRAVENOUS at 13:22

## 2024-07-08 NOTE — PROGRESS NOTE ADULT - ASSESSMENT
58F with PMH including CP, anemia, Down's syndrome, seizures, OP, hypotension, nonverbal at at baseline, here with respiratory distress, and found to have acute hypoxic respiratory failure from PNA, possibly from aspiration, admitted to medicine stepdown on pressors and IV abx.    #Acute Hypoxemic Respiratory Failure due to suspected Aspiration PNA, recurrent  completed antibiotics  GI feels a G_J tube would be of no benefit to pt     #Hx Bacteremia with ESBL Proteus and VR E fecalis and fecium.    # Cerebral Palsy/ Down syndrome/ Nonverbal at baseline     #Elevated lactate resolved     #Seizure  Keppra     #Hx of duodenal perforation     #Foot pressure ulcers -  podiatry eval appreciated , local care     #intermittent tachycardia    #Overall prognosis is poor, high risk of decompensation    PROGRESS NOTE HANDOFF    Pending: CAB meeting pending medically stable to discharge     Family discussion: staff at bedside aware of plan of care     Disposition: Group  Home

## 2024-07-08 NOTE — PROGRESS NOTE ADULT - ASSESSMENT
ASSESSMENT  57 yo F with hx of down syndrome, anemia, cerebral palsy, seizure disorder, osteoporosis, hypotension on midodrine, nonverbal at baseline who was BIBEMS from group home for respiratory distress with complaints of fever and hypoxia.     IMPRESSION  #Septic Shock  #Acute Hypoxemic Respiratory failure   #Severe bacterial pneumonia, possible GN pneumonia   #Cerebral Palsy  #seizure Disorder    #DM   #Abx allergy:     RECOMMENDATIONS  - completed 10 day course of antibiotics -- diaphoretic but no worsening leukocytosis off antibiotics  - recall as needed    Please call or message on [employer] Teams if with any questions.  Spectra [phone]

## 2024-07-08 NOTE — PROGRESS NOTE ADULT - ASSESSMENT
58F with PMH including CP, anemia, Down's syndrome, seizures, OP, hypotension, nonverbal at at baseline, here with respiratory distress, and found to have acute hypoxic respiratory failure from PNA, possibly from aspiration, admitted to medicine stepdown on pressors and IV abx.    #Acute Hypoxemic Respiratory Failure due to suspected Aspiration PNA, recurrent  #Septic shock (POA)  #Hx Bacteremia with ESBL Proteus and VR E fecalis and fecium  #Hx of Cerebral Palsy/ Down syndrome/ Nonverbal at baseline  - Last Lactate 1.5 (29/6) down from 2.7, 3.9, 5.4.   - CTA chest (6/27/24):  a) reported Diffuse, near complete left lower lobe and upper lobe mixed consolidation and atelectasis, with some remaining aeration to left upper lobe.                        b) Occlusion of right mainstem bronchus; correlation for aspiration suggested.                        c) Near complete resolution of right lung airspace opacification, with  few residual likely inflammatory/infectious ground-glass airspace opacities.                        d) No evidence of acute pulmonary embolism.  - Weaned down from venturi mask (7/1/24), currently on high flow nasal cannula 4L (7/3/24). Plan to wean down to 3L today (7/4/24)  - Midodrine 10mg q8hrs  - so far, Ucx and blood cx NGTD, procal 0.6  - appreciate ID eval, cw meropenem and Zyvox (switched to PO, per ID recommendations 7/1/24), d/c doxycycline. Last dose scheduled for 7/5/24  - Chest PT via vest, nebs, daily chest xrays.  - strict aspiration precautions, resume PEG feeds   - palliative team following: patient is already DNR/DNI, MOLST checklist completed on prior admission with CAB and [facility]  - GI consulted to consider G_Jtube, they do not recommend it at this time as no benefit with aspiration.   - Pt need consent from CAB for PEJ. Following up with , once consent is received reach out to GI so they can plan the procedure.   - 7/6/24 bladder scans ~600, straight cath overnight. f/u bladder scans    #Seizure   - c/w Keppra     #Hx of duodenal perforation  - Continue PPI     #Pressure ulcers, bilaterally  - Dressing changed this AM 7/3/24  - Wound care consulted, appreciate recs  - Podiatry consulted, appreciate recs    Overall prognosis is poor, high risk of decompensation  Pending: monitor BP, c/w IV abx, resume tube feeds, GI consult, oxygen weaning

## 2024-07-08 NOTE — PROGRESS NOTE ADULT - SUBJECTIVE AND OBJECTIVE BOX
TEA ECHAVARRIA  58y, Female  Allergy: chlorhexidine containing compounds (Rash (Mild to Mod))      LOS  12d    CHIEF COMPLAINT: Sepsis (08 Jul 2024 12:31)      INTERVAL EVENTS/HPI  - No acute events overnight  - T(F): , Max: 98 (07-08-24 @ 06:32)  - completed antibiotics 7/5 - has remained afebrile, although still with some diaphoresis   - WBC Count: 6.82 (07-08-24 @ 07:33)  WBC Count: 5.47 (07-06-24 @ 06:02)     - Creatinine: 0.5 (07-08-24 @ 07:33)       ROS  unable to obtain history secondary to patient's mental status and/or sedation      VITALS:  T(F): 98, Max: 98 (07-08-24 @ 06:32)  HR: 95  BP: 96/64  RR: 20Vital Signs Last 24 Hrs  T(C): 36.7 (08 Jul 2024 12:18), Max: 36.7 (08 Jul 2024 06:32)  T(F): 98 (08 Jul 2024 12:18), Max: 98 (08 Jul 2024 06:32)  HR: 95 (08 Jul 2024 12:18) (95 - 118)  BP: 96/64 (08 Jul 2024 12:18) (85/46 - 105/61)  BP(mean): 76 (08 Jul 2024 06:32) (59 - 76)  RR: 20 (08 Jul 2024 12:18) (18 - 20)  SpO2: 96% (08 Jul 2024 12:18) (94% - 96%)    Parameters below as of 07 Jul 2024 19:50  Patient On (Oxygen Delivery Method): nasal cannula  O2 Flow (L/min): 4      PHYSICAL EXAM:  Gen: NAD, resting in bed  HEENT: Normocephalic, atraumatic  Neck: supple, no lymphadenopathy  CV: Regular rate & regular rhythm  Lungs: decreased BS at bases, no fremitus  Abdomen: Soft, BS present  Ext: Warm, well perfused  Neuro: non focal, awake  Skin: no rash, no erythema  Lines: no phlebitis    FH: Non-contributory  Social Hx: Non-contributory    TESTS & MEASUREMENTS:                        9.3    6.82  )-----------( 404      ( 08 Jul 2024 07:33 )             29.7     07-08    137  |  100  |  20  ----------------------------<  137<H>  4.7   |  26  |  0.5<L>    Ca    8.8      08 Jul 2024 07:33  Mg     2.5     07-08          Urinalysis Basic - ( 08 Jul 2024 07:33 )    Color: x / Appearance: x / SG: x / pH: x  Gluc: 137 mg/dL / Ketone: x  / Bili: x / Urobili: x   Blood: x / Protein: x / Nitrite: x   Leuk Esterase: x / RBC: x / WBC x   Sq Epi: x / Non Sq Epi: x / Bacteria: x        Culture - Blood (collected 06-29-24 @ 17:33)  Source: .Blood None  Final Report (07-05-24 @ 02:00):    No growth at 5 days    Urinalysis with Rflx Culture (collected 06-26-24 @ 22:10)    Culture - Urine (collected 06-26-24 @ 22:10)  Source: Catheterized None  Final Report (06-28-24 @ 21:21):    <10,000 CFU/mL Normal Urogenital Mili    Culture - Blood (collected 06-26-24 @ 19:14)  Source: .Blood Blood-Peripheral  Final Report (07-01-24 @ 23:00):    No growth at 5 days    Culture - Blood (collected 06-26-24 @ 19:14)  Source: .Blood Blood-Peripheral  Final Report (07-01-24 @ 23:00):    No growth at 5 days            INFECTIOUS DISEASES TESTING  Procalcitonin: 0.60 (06-28-24 @ 11:00)  Procalcitonin: 0.11 (06-12-24 @ 11:58)  MRSA PCR Result.: Positive (06-01-24 @ 13:02)  Procalcitonin: 0.28 (06-01-24 @ 11:14)  Procalcitonin: 0.62 (06-01-24 @ 04:17)  Rapid RVP Result: NotDetec (06-01-24 @ 00:15)  Procalcitonin: 0.59 (05-06-24 @ 10:50)  Procalcitonin: 0.16 (05-03-24 @ 18:01)  Aspergillus Galactomannan Antigen: 0.03 (05-03-24 @ 18:01)  Fungitell: 86 (05-03-24 @ 18:00)  Fungitell: 53 (04-25-24 @ 21:33)  Fungitell: 53 (04-24-24 @ 05:57)  MRSA PCR Result.: Negative (04-16-24 @ 14:39)  Rapid RVP Result: NotDetec (04-16-24 @ 14:39)  Procalcitonin, Serum: 6.93 (04-16-24 @ 07:03)  MRSA PCR Result.: Negative (03-16-24 @ 23:45)  Fungitell: 81 (03-13-24 @ 17:24)  Procalcitonin, Serum: 0.19 (03-13-24 @ 07:08)  Fungitell: 73 (02-23-24 @ 18:19)  MRSA PCR Result.: Negative (02-20-24 @ 13:42)  Fungitell: 76 (02-19-24 @ 16:00)  Procalcitonin, Serum: 0.16 (02-19-24 @ 16:00)  Procalcitonin, Serum: 0.20 (02-19-24 @ 11:23)  Rapid RVP Result: NotDetec (02-17-24 @ 19:06)  Procalcitonin, Serum: 0.11 (02-17-24 @ 10:41)  MRSA PCR Result.: Negative (02-15-24 @ 06:20)  Procalcitonin, Serum: 0.10 (02-12-24 @ 11:17)  Rapid RVP Result: NotDetec (02-12-24 @ 10:28)  MRSA PCR Result.: Negative (02-12-24 @ 10:28)  Fungitell: 63 (02-06-24 @ 11:27)  Fungitell: 65 (02-06-24 @ 04:43)  Rapid RVP Result: NotDetec (02-04-24 @ 10:28)  MRSA PCR Result.: Negative (02-03-24 @ 21:32)  Procalcitonin, Serum: 0.15 (02-03-24 @ 11:13)  Procalcitonin, Serum: 0.22 (02-01-24 @ 16:00)  MRSA PCR Result.: Negative (01-22-24 @ 05:30)  Legionella Antigen, Urine: Negative (01-21-24 @ 05:00)  Rapid RVP Result: Detected (01-21-24 @ 00:58)  Procalcitonin, Serum: 0.51 (01-20-24 @ 21:23)  Fungitell: 325 (01-20-24 @ 21:23)  Fungitell: 138 (11-07-23 @ 08:08)  Fungitell: 115 (11-02-23 @ 11:40)  Procalcitonin, Serum: 0.04 (11-02-23 @ 11:40)  Procalcitonin, Serum: 0.26 (10-24-23 @ 11:00)  MRSA PCR Result.: Negative (10-17-23 @ 17:20)  Fungitell: >500 (10-17-23 @ 17:20)  Procalcitonin, Serum: 4.36 (10-17-23 @ 17:20)  Procalcitonin, Serum: 4.18 (10-17-23 @ 12:35)  Procalcitonin, Serum: 0.07 (10-15-23 @ 07:28)  COVID-19 PCR: NotDetec (10-12-23 @ 09:14)  Procalcitonin, Serum: 0.40 (10-07-23 @ 10:54)  Legionella Antigen, Urine: Negative (09-30-23 @ 14:36)  MRSA PCR Result.: Negative (09-29-23 @ 16:50)  Procalcitonin, Serum: 9.78 (08-12-23 @ 11:25)  MRSA PCR Result.: Negative (08-12-23 @ 06:10)  Rapid RVP Result: NotDetec (08-12-23 @ 01:33)  Procalcitonin, Serum: 0.63 (08-10-23 @ 08:46)  Procalcitonin, Serum: 7.82 (08-04-23 @ 16:13)  MRSA PCR Result.: Negative (08-04-23 @ 14:52)  Rapid RVP Result: NotDetec (08-04-23 @ 03:00)      INFLAMMATORY MARKERS  Sedimentation Rate, Erythrocyte: 13 mm/Hr (05-21-24 @ 07:06)  C-Reactive Protein: 31.9 mg/L (05-21-24 @ 07:06)  Sedimentation Rate, Erythrocyte: 100 mm/Hr (02-03-24 @ 11:13)  C-Reactive Protein, Serum: 214.2 mg/L (02-03-24 @ 11:13)      RADIOLOGY & ADDITIONAL TESTS:  I have personally reviewed the last available Chest xray  CXR  Xray Chest 1 View- PORTABLE-Urgent:   ACC: [id number] EXAM:  XR CHEST PORTABLE URGENT 1V   ORDERED BY: BLANKA MELENDREZ     PROCEDURE DATE:  07/08/2024          INTERPRETATION:  Clinical History / Reason for exam: Shortness of breath    Comparison : Chest radiograph July 5, 2024.    Technique/Positioning: Frontal portable, low lung volumes.    Findings:    Support devices: None.    Cardiac/mediastinum/hilum: Stable    Lung parenchyma/Pleura: Stable opacities    Skeleton/soft tissues: Without difference    Impression:    Stable opacities.        --- End of Report ---            FAYE JOHNSON MD; Attending Interventional Radiologist  This document has been electronically signed. Jul 8 2024 12:14PM (07-08-24 @ 12:03)      CT      CARDIOLOGY TESTING  12 Lead ECG:   Ventricular Rate 134 BPM    Atrial Rate 134 BPM    P-R Interval 116 ms    QRS Duration 66 ms    Q-T Interval 318 ms    QTC Calculation(Bazett) 474 ms    P Axis 31 degrees    R Axis 81 degrees    T Axis 44 degrees    Diagnosis Line *** Poor data quality, interpretation may be adversely affected  Possible Sinus tachycardia  Nondiagnostic ECG  Confirmed by Tiffanie Pollock MD ([id number]) on 7/5/2024 4:56:17 PM (07-05-24 @ 13:35)  12 Lead ECG:   Ventricular Rate 140 BPM    Atrial Rate 67 BPM    P-R Interval 154 ms    QRS Duration 68 ms    Q-T Interval 286 ms    QTC Calculation(Bazett) 436 ms    P Axis 104 degrees    R Axis 97 degrees    T Axis 67 degrees    Diagnosis Line *** Poor data quality, interpretation may be adversely affected  Baseline artifact  Undetermined rhythm  Rightward axis  Low voltage QRS  Abnormal ECG    Confirmed by Justice Stockton ([id number]) on 6/27/2024 8:53:18 PM (06-27-24 @ 18:02)      MEDICATIONS  albuterol/ipratropium for Nebulization 3 Nebulizer every 6 hours  bacitracin   Ointment 1 Topical every 8 hours  calcium carbonate   1250 mG (OsCal) 1 Oral daily  collagenase Ointment 1 Topical two times a day  enoxaparin Injectable 30 SubCutaneous every 24 hours  gabapentin 300 Oral two times a day  levETIRAcetam  Solution 750 Oral two times a day  melatonin 3 Oral at bedtime  midodrine 10 Oral every 8 hours  pantoprazole   Suspension 40 Enteral Tube daily  raloxifene 60 Oral daily  sodium chloride 3%  Inhalation 4 Inhalation two times a day      WEIGHT  Weight (kg): 41 (06-26-24 @ 19:01)  Creatinine: 0.5 mg/dL (07-08-24 @ 07:33)      ANTIBIOTICS:      All available historical records have been reviewed

## 2024-07-08 NOTE — CHART NOTE - NSCHARTNOTEFT_GEN_A_CORE
Registered Dietitian Follow-Up     Patient Profile Reviewed                           Yes [X]   No []     Nutrition History Previously Obtained        Yes [X]  No []       Pertinent Subjective Information: Per aide at bedside, pt tolerating TF regimen well. No complaints.      Pertinent Medical Interventions: 58F with PMH including CP, anemia, Down's syndrome, seizures, OP, hypotension, nonverbal at at baseline, here with respiratory distress, and found to have acute hypoxic respiratory failure from PNA, possibly from aspiration, admitted to medicine stepdown on pressors and IV abx.    #Acute Hypoxemic Respiratory Failure due to suspected Aspiration PNA, recurrent  #Septic shock (POA)  #Pressure ulcers, bilaterally       Diet order:   Diet, NPO with Tube Feed:   Tube Feeding Modality: Gastrostomy  Jevity 1.2 Juventino (JEVITY1.2RTH)  Total Volume for 24 Hours (mL): 1300  Bolus  Total Volume of Bolus (mL):  325  Total # of Feeds: 4  Tube Feed Frequency: Every 6 hours   Tube Feed Start Time: 00:00  Bolus Feed Rate (mL per Hour): 325   Bolus Feed Duration (in Hours): 1  Free Water Flush   Total Volume per Flush (mL): 150   Frequency: Every 6 Hours  Free Water Flush Instructions:  50 mL free water flushes pre/post feeds (06-30-24 @ 13:45) [Active]      Anthropometrics:  Height (cm): 134 cm   Weight (kg): 41 kg   BMI: 22.8     Daily   % Weight Change      MEDICATIONS  (STANDING):  albuterol/ipratropium for Nebulization 3 milliLiter(s) Nebulizer every 6 hours  bacitracin   Ointment 1 Application(s) Topical every 8 hours  calcium carbonate   1250 mG (OsCal) 1 Tablet(s) Oral daily  collagenase Ointment 1 Application(s) Topical two times a day  enoxaparin Injectable 30 milliGRAM(s) SubCutaneous every 24 hours  gabapentin 300 milliGRAM(s) Oral two times a day  levETIRAcetam  Solution 750 milliGRAM(s) Oral two times a day  melatonin 3 milliGRAM(s) Oral at bedtime  midodrine 10 milliGRAM(s) Oral every 8 hours  pantoprazole   Suspension 40 milliGRAM(s) Enteral Tube daily  raloxifene 60 milliGRAM(s) Oral daily  sodium chloride 3%  Inhalation 4 milliLiter(s) Inhalation two times a day    MEDICATIONS  (PRN):  acetaminophen     Tablet .. 650 milliGRAM(s) Oral every 6 hours PRN Temp greater or equal to 38C (100.4F), Mild Pain (1 - 3)  aluminum hydroxide/magnesium hydroxide/simethicone Suspension 30 milliLiter(s) Oral every 4 hours PRN Dyspepsia  ondansetron Injectable 4 milliGRAM(s) IV Push every 8 hours PRN Nausea and/or Vomiting    Pertinent Labs: 07-08 @ 07:33: Na 137, BUN 20, Cr 0.5<L>, <H>, K+ 4.7, Phos --, Mg 2.5<H>, Alk Phos --, ALT/SGPT --, AST/SGOT --, HbA1c --    Finger Sticks:    Physical Findings:  - Appearance: Nonverbal   - GI function:  - Tubes: PEG  - Oral/Mouth cavity: NPO w/ TF  - Skin: per podiatry 7/3: Ulcer detected at Right Heel, Plantar midfoot, and Left 1st MPTJ, and Heel    - Edema: 1+ generalized edema, R/L hand, R/L arm per flowsheet     Nutrition Requirements: Per initial assessment 6/30: Estimated needs with consideration for Age, Weight, BMI, Malnutrition, multiple pressure injuries   Weight Used: 41 kg (CBW)     Estimated Energy Needs    Continue [X]  Adjust []  Adjusted Energy Recommendations:  1435-1640kcal/day (35-40 kcal/kg) kcal/day        Estimated Protein Needs    Continue [X]  Adjust []  Adjusted Protein Recommendations: 62-82 g/day (1.5-2 g/kg)  gm/day        Estimated Fluid Needs        Continue [X]  Adjust []  Adjusted Fluid Recommendations:  1 mL/kcal/day       Nutrient Intake: TF at goal rate. Provides: 1560 kcal, 72 g protein, 1449 mL total H2O (1049 mL free water from formula)    [X] Previous Nutrition Diagnosis: Severe PCM            [X] Ongoing          [] Resolved       Nutrition Education: Deferred     Goal/Expected Outcome: Pt to meet >85% and <105% of estimated needs via TF within 5-7 days.      Indicator/Monitoring: EN order/tolerance, weights, labs, GI s/s, BG, skin status, NFPE    Recommendation:   1. Continue with current TF order  2. Maintain all aspiration precautions    Pt at moderate risk, f/u in 5-7 days or prn     RD Team to remain available: Faustino Hutchinson [phone] or via TEAMS Registered Dietitian Follow-Up     Patient Profile Reviewed                           Yes [X]   No []     Nutrition History Previously Obtained        Yes [X]  No []       Pertinent Subjective Information: Per aide at bedside, pt tolerating TF regimen well. No GI complaints.      Pertinent Medical Interventions: 58F with PMH including CP, anemia, Down's syndrome, seizures, OP, hypotension, nonverbal at at baseline, here with respiratory distress, and found to have acute hypoxic respiratory failure from PNA, possibly from aspiration, admitted to medicine stepdown on pressors and IV abx.    #Acute Hypoxemic Respiratory Failure due to suspected Aspiration PNA, recurrent  #Septic shock (POA)  #Pressure ulcers, bilaterally  - plan for PEJ OP , dependant on GI discussion with guardian       Diet order:   Diet, NPO with Tube Feed:   Tube Feeding Modality: Gastrostomy  Jevity 1.2 Juventino (JEVITY1.2RTH)  Total Volume for 24 Hours (mL): 1300  Bolus  Total Volume of Bolus (mL):  325  Total # of Feeds: 4  Tube Feed Frequency: Every 6 hours   Tube Feed Start Time: 00:00  Bolus Feed Rate (mL per Hour): 325   Bolus Feed Duration (in Hours): 1  Free Water Flush   Total Volume per Flush (mL): 150   Frequency: Every 6 Hours  Free Water Flush Instructions:  50 mL free water flushes pre/post feeds (06-30-24 @ 13:45) [Active]      Anthropometrics:  Height (cm): 134 cm   Weight (kg): 41 kg   BMI: 22.8     MEDICATIONS  (STANDING):  albuterol/ipratropium for Nebulization 3 milliLiter(s) Nebulizer every 6 hours  bacitracin   Ointment 1 Application(s) Topical every 8 hours  calcium carbonate   1250 mG (OsCal) 1 Tablet(s) Oral daily  collagenase Ointment 1 Application(s) Topical two times a day  enoxaparin Injectable 30 milliGRAM(s) SubCutaneous every 24 hours  gabapentin 300 milliGRAM(s) Oral two times a day  levETIRAcetam  Solution 750 milliGRAM(s) Oral two times a day  melatonin 3 milliGRAM(s) Oral at bedtime  midodrine 10 milliGRAM(s) Oral every 8 hours  pantoprazole   Suspension 40 milliGRAM(s) Enteral Tube daily  raloxifene 60 milliGRAM(s) Oral daily  sodium chloride 3%  Inhalation 4 milliLiter(s) Inhalation two times a day    MEDICATIONS  (PRN):  acetaminophen     Tablet .. 650 milliGRAM(s) Oral every 6 hours PRN Temp greater or equal to 38C (100.4F), Mild Pain (1 - 3)  aluminum hydroxide/magnesium hydroxide/simethicone Suspension 30 milliLiter(s) Oral every 4 hours PRN Dyspepsia  ondansetron Injectable 4 milliGRAM(s) IV Push every 8 hours PRN Nausea and/or Vomiting    Pertinent Labs: 07-08 @ 07:33: Na 137, BUN 20, Cr 0.5<L>, <H>, K+ 4.7, Phos --, Mg 2.5<H>, Alk Phos --, ALT/SGPT --, AST/SGOT --, HbA1c --    Physical Findings:  - Appearance: Nonverbal   - GI function: Last BM per flowsheet 7/6   - Tubes: PEG  - Oral/Mouth cavity: NPO w/ TF  - Skin: per podiatry 7/3: Ulcer detected at Right Heel, Plantar midfoot, and Left 1st MPTJ, and Heel  Per wound 7/2: Pressure Injury to right heel and right medial foot, Pressure Injuries to Left medial foot-pink  - Edema: 1+ generalized edema, R/L hand, R/L arm per flowsheet     Nutrition Requirements: Per initial assessment 6/30: Estimated needs with consideration for Age, Weight, BMI, Malnutrition, multiple pressure injuries   Weight Used: 41 kg (CBW)     Estimated Energy Needs    Continue [X]  Adjust []  Adjusted Energy Recommendations:  1435-1640kcal/day (35-40 kcal/kg) kcal/day        Estimated Protein Needs    Continue [X]  Adjust []  Adjusted Protein Recommendations: 62-82 g/day (1.5-2 g/kg)  gm/day        Estimated Fluid Needs        Continue [X]  Adjust []  Adjusted Fluid Recommendations:  1 mL/kcal/day       Nutrient Intake: TF at goal rate. Provides: 1560 kcal, 72 g protein, 1449 mL total H2O (1049 mL free water from formula)    [X] Previous Nutrition Diagnosis: Severe PCM            [X] Ongoing          [] Resolved       Nutrition Education: Deferred     Goal/Expected Outcome: Pt to meet >85% and <105% of estimated needs via TF within 5-7 days.      Indicator/Monitoring: EN order/tolerance, weights, labs, GI s/s, BG, skin status, NFPE    Recommendation:   1. Continue with current TF order  2. Maintain all aspiration precautions    Pt at moderate risk, f/u in 5-7 days or prn     RD Team to remain available: Faustino Hutchinson x [phone] or via TEAMS

## 2024-07-08 NOTE — PROGRESS NOTE ADULT - SUBJECTIVE AND OBJECTIVE BOX
Patient is a 58y old  Female who presents with a chief complaint of Sepsis (08 Jul 2024 17:22)      Patient seen and examined at bedside.    ALLERGIES:  chlorhexidine containing compounds (Rash (Mild to Mod))    MEDICATIONS:  acetaminophen     Tablet .. 650 milliGRAM(s) Oral every 6 hours PRN  albuterol/ipratropium for Nebulization 3 milliLiter(s) Nebulizer every 6 hours  aluminum hydroxide/magnesium hydroxide/simethicone Suspension 30 milliLiter(s) Oral every 4 hours PRN  bacitracin   Ointment 1 Application(s) Topical every 8 hours  calcium carbonate   1250 mG (OsCal) 1 Tablet(s) Oral daily  collagenase Ointment 1 Application(s) Topical two times a day  enoxaparin Injectable 30 milliGRAM(s) SubCutaneous every 24 hours  gabapentin 300 milliGRAM(s) Oral two times a day  levETIRAcetam  Solution 750 milliGRAM(s) Oral two times a day  melatonin 3 milliGRAM(s) Oral at bedtime  midodrine 10 milliGRAM(s) Oral every 8 hours  ondansetron Injectable 4 milliGRAM(s) IV Push every 8 hours PRN  pantoprazole   Suspension 40 milliGRAM(s) Enteral Tube daily  raloxifene 60 milliGRAM(s) Oral daily  sodium chloride 3%  Inhalation 4 milliLiter(s) Inhalation two times a day    Vital Signs Last 24 Hrs  T(F): 98 (08 Jul 2024 12:18), Max: 98 (08 Jul 2024 06:32)  HR: 95 (08 Jul 2024 12:18) (95 - 118)  BP: 96/64 (08 Jul 2024 12:18) (85/46 - 105/61)  RR: 20 (08 Jul 2024 12:18) (18 - 20)  SpO2: 96% (08 Jul 2024 12:18) (94% - 96%)  I&O's Summary    07 Jul 2024 07:01  -  08 Jul 2024 07:00  --------------------------------------------------------  IN: 575 mL / OUT: 0 mL / NET: 575 mL        PHYSICAL EXAM:  General: NAD, Alert, nonverbal   ENT: MMM  Neck: Supple, No JVD  Lungs: bilateral crackles r>L  Cardio: RRR, S1/S2, No murmurs  Abdomen: Soft, Nontender, Nondistended; Bowel sounds present  Extremities: No cyanosis, No edema    LABS:                        9.3    6.82  )-----------( 404      ( 08 Jul 2024 07:33 )             29.7     07-08    137  |  100  |  20  ----------------------------<  137  4.7   |  26  |  0.5    Ca    8.8      08 Jul 2024 07:33  Mg     2.5     07-08                06-01 Chol 103 mg/dL LDL -- HDL 31 mg/dL Trig 90 mg/dL                  Urinalysis Basic - ( 08 Jul 2024 07:33 )    Color: x / Appearance: x / SG: x / pH: x  Gluc: 137 mg/dL / Ketone: x  / Bili: x / Urobili: x   Blood: x / Protein: x / Nitrite: x   Leuk Esterase: x / RBC: x / WBC x   Sq Epi: x / Non Sq Epi: x / Bacteria: x            RADIOLOGY & ADDITIONAL TESTS:    Care Discussed with Consultants/Other Providers:

## 2024-07-08 NOTE — PROGRESS NOTE ADULT - SUBJECTIVE AND OBJECTIVE BOX
JERICHO TEA  58y  Female    Reason for Admission:   OVERNIGHT/INTERIM: Pt seen and examined at bedside during AM rounds. Today is hospital day 12. No acute or major events overnight. Pt pending CAB meeting in preparation for discharge.     Vital Signs Last 24 Hrs  T(C): 36.7 (08 Jul 2024 12:18), Max: 36.7 (08 Jul 2024 06:32)  T(F): 98 (08 Jul 2024 12:18), Max: 98 (08 Jul 2024 06:32)  HR: 95 (08 Jul 2024 12:18) (95 - 118)  BP: 96/64 (08 Jul 2024 12:18) (85/46 - 105/61)  BP(mean): 76 (08 Jul 2024 06:32) (59 - 76)  RR: 20 (08 Jul 2024 12:18) (18 - 20)  SpO2: 96% (08 Jul 2024 12:18) (94% - 96%)    Parameters below as of 07 Jul 2024 19:50  Patient On (Oxygen Delivery Method): nasal cannula  O2 Flow (L/min): 4      PHYSICAL EXAM:  General: NAD, alert   ENT: MMM  Neck: Supple, No JVD  Lungs: diminished bilaterally, no crackles   Cardio: RRR, S1/S2, No murmurs  Abdomen: Soft, Nontender, Nondistended; Bowel sounds present  Extremities: No cyanosis, No edema, muscle atrophy  NEUROLOGIC:  does not follow commands   PSYCH: lethargic, nonverbal.      LABS:      LABS:                          9.3    6.82  )-----------( 404      ( 08 Jul 2024 07:33 )             29.7     07-08    137  |  100  |  20  ----------------------------<  137<H>  4.7   |  26  |  0.5<L>    Ca    8.8      08 Jul 2024 07:33  Mg     2.5     07-08          Urinalysis Basic - ( 08 Jul 2024 07:33 )    Color: x / Appearance: x / SG: x / pH: x  Gluc: 137 mg/dL / Ketone: x  / Bili: x / Urobili: x   Blood: x / Protein: x / Nitrite: x   Leuk Esterase: x / RBC: x / WBC x   Sq Epi: x / Non Sq Epi: x / Bacteria: x          MedsMEDICATIONS  (STANDING):  albuterol/ipratropium for Nebulization 3 milliLiter(s) Nebulizer every 6 hours  bacitracin   Ointment 1 Application(s) Topical every 8 hours  calcium carbonate   1250 mG (OsCal) 1 Tablet(s) Oral daily  collagenase Ointment 1 Application(s) Topical two times a day  enoxaparin Injectable 30 milliGRAM(s) SubCutaneous every 24 hours  gabapentin 300 milliGRAM(s) Oral two times a day  levETIRAcetam  Solution 750 milliGRAM(s) Oral two times a day  melatonin 3 milliGRAM(s) Oral at bedtime  midodrine 10 milliGRAM(s) Oral every 8 hours  pantoprazole   Suspension 40 milliGRAM(s) Enteral Tube daily  raloxifene 60 milliGRAM(s) Oral daily  sodium chloride 3%  Inhalation 4 milliLiter(s) Inhalation two times a day    MEDICATIONS  (PRN):  acetaminophen     Tablet .. 650 milliGRAM(s) Oral every 6 hours PRN Temp greater or equal to 38C (100.4F), Mild Pain (1 - 3)  aluminum hydroxide/magnesium hydroxide/simethicone Suspension 30 milliLiter(s) Oral every 4 hours PRN Dyspepsia  ondansetron Injectable 4 milliGRAM(s) IV Push every 8 hours PRN Nausea and/or Vomiting

## 2024-07-09 PROCEDURE — 99232 SBSQ HOSP IP/OBS MODERATE 35: CPT

## 2024-07-09 RX ADMIN — IPRATROPIUM BROMIDE AND ALBUTEROL SULFATE 3 MILLILITER(S): .5; 3 SOLUTION RESPIRATORY (INHALATION) at 20:01

## 2024-07-09 RX ADMIN — Medication 1 APPLICATION(S): at 05:03

## 2024-07-09 RX ADMIN — Medication 1 APPLICATION(S): at 17:50

## 2024-07-09 RX ADMIN — PANTOPRAZOLE SODIUM 40 MILLIGRAM(S): 40 INJECTION, POWDER, FOR SOLUTION INTRAVENOUS at 13:44

## 2024-07-09 RX ADMIN — Medication 3 MILLIGRAM(S): at 21:19

## 2024-07-09 RX ADMIN — LEVETIRACETAM 750 MILLIGRAM(S): 100 INJECTION INTRAVENOUS at 05:02

## 2024-07-09 RX ADMIN — ENOXAPARIN SODIUM 30 MILLIGRAM(S): 100 INJECTION SUBCUTANEOUS at 01:05

## 2024-07-09 RX ADMIN — Medication 1 APPLICATION(S): at 05:02

## 2024-07-09 RX ADMIN — Medication 4 MILLILITER(S): at 08:02

## 2024-07-09 RX ADMIN — IPRATROPIUM BROMIDE AND ALBUTEROL SULFATE 3 MILLILITER(S): .5; 3 SOLUTION RESPIRATORY (INHALATION) at 13:40

## 2024-07-09 RX ADMIN — IPRATROPIUM BROMIDE AND ALBUTEROL SULFATE 3 MILLILITER(S): .5; 3 SOLUTION RESPIRATORY (INHALATION) at 08:00

## 2024-07-09 RX ADMIN — Medication 4 MILLILITER(S): at 20:01

## 2024-07-09 RX ADMIN — Medication 300 MILLIGRAM(S): at 05:01

## 2024-07-09 RX ADMIN — LEVETIRACETAM 750 MILLIGRAM(S): 100 INJECTION INTRAVENOUS at 17:49

## 2024-07-09 RX ADMIN — Medication 1 APPLICATION(S): at 21:21

## 2024-07-09 RX ADMIN — Medication 1 TABLET(S): at 13:44

## 2024-07-09 RX ADMIN — Medication 300 MILLIGRAM(S): at 17:50

## 2024-07-09 RX ADMIN — MIDODRINE HYDROCHLORIDE 10 MILLIGRAM(S): 10 TABLET ORAL at 13:44

## 2024-07-09 RX ADMIN — MIDODRINE HYDROCHLORIDE 10 MILLIGRAM(S): 10 TABLET ORAL at 21:20

## 2024-07-09 RX ADMIN — MIDODRINE HYDROCHLORIDE 10 MILLIGRAM(S): 10 TABLET ORAL at 05:02

## 2024-07-09 RX ADMIN — RALOXIFENE HYDROCHLORIDE 60 MILLIGRAM(S): 60 TABLET, FILM COATED ORAL at 13:44

## 2024-07-09 RX ADMIN — Medication 1 APPLICATION(S): at 13:44

## 2024-07-09 NOTE — PROGRESS NOTE ADULT - ASSESSMENT
58F with PMH including CP, anemia, Down's syndrome, seizures, OP, hypotension, nonverbal at at baseline, here with respiratory distress, and found to have acute hypoxic respiratory failure from PNA, possibly from aspiration, admitted to medicine stepdown on pressors and IV abx.    #Acute Hypoxemic Respiratory Failure due to suspected Aspiration PNA, recurrent  #Septic shock (POA)  #Hx Bacteremia with ESBL Proteus and VR E fecalis and fecium  #Hx of Cerebral Palsy/ Down syndrome/ Nonverbal at baseline  - Last Lactate 1.5 (29/6) down from 2.7, 3.9, 5.4.  - CTA chest (6/27/24):  a) reported Diffuse, near complete left lower lobe and upper lobe mixed consolidation and atelectasis, with some remaining aeration to left upper lobe.                        b) Occlusion of right mainstem bronchus; correlation for aspiration suggested.                        c) Near complete resolution of right lung airspace opacification, with  few residual likely inflammatory/infectious ground-glass airspace opacities.                        d) No evidence of acute pulmonary embolism.  - Weaned down from venturi mask (7/1/24), currently on high flow nasal cannula 4L (7/3/24). Plan to wean down to 3L today (7/4/24)  - Midodrine 10mg q8hrs  - so far, Ucx and blood cx NGTD, procal 0.6  - appreciate ID eval, cw meropenem and Zyvox (switched to PO, per ID recommendations 7/1/24), d/c doxycycline. Last dose scheduled for 7/5/24  - Chest PT via vest, nebs, daily chest xrays.  - strict aspiration precautions, resume PEG feeds   - palliative team following: patient is already DNR/DNI, MOLST checklist completed on prior admission with [facility] and [facility] [place]  - GI consulted to consider G_Jtube, they do not recommend it at this time as no benefit with aspiration.   - Pt need consent from CAB for PEJ. Following up with , once consent is received reach out to GI so they can plan the procedure.  - 7/6/24 bladder scans ~600, straight cath overnight. f/u bladder scans    #Seizure   - c/w Keppra     #Hx of duodenal perforation  - Continue PPI     #Pressure ulcers, bilaterally  - Dressing changed this AM 7/3/24  - Wound care consulted, appreciate recs  - Podiatry consulted, appreciate recs    Overall prognosis is poor, high risk of decompensation

## 2024-07-09 NOTE — PROGRESS NOTE ADULT - REASON FOR ADMISSION
Sepsis

## 2024-07-09 NOTE — PROGRESS NOTE ADULT - PROVIDER SPECIALTY LIST ADULT
Hospitalist
Hospitalist
Infectious Disease
Internal Medicine
Critical Care
Hospitalist
Infectious Disease
Internal Medicine
Internal Medicine
Infectious Disease
Internal Medicine
Hospitalist
Internal Medicine
Palliative Care
Pulmonology
Hospitalist
Hospitalist
Infectious Disease
Palliative Care

## 2024-07-09 NOTE — PROGRESS NOTE ADULT - SUBJECTIVE AND OBJECTIVE BOX
JERICHOTEA  58y  Female    Reason for Admission:   OVERNIGHT/INTERIM: Pt seen and examined at bedside during AM rounds. Today is hospital day 13. Patient saturating at 3L, home oxygen dose. No acute or major events overnight. CAB meeting scheduled for today in preparation for discharge and for obtaining consent for PEJ.      Vital Signs Last 24 Hrs  T(C): 37.7 (09 Jul 2024 12:35), Max: 37.7 (09 Jul 2024 12:35)  T(F): 99.8 (09 Jul 2024 12:35), Max: 99.8 (09 Jul 2024 12:35)  HR: 101 (09 Jul 2024 12:35) (94 - 112)  BP: 114/84 (09 Jul 2024 12:35) (98/57 - 116/63)  BP(mean): --  RR: 19 (09 Jul 2024 12:35) (18 - 20)  SpO2: 96% (09 Jul 2024 12:35) (96% - 97%)    Parameters below as of 09 Jul 2024 04:53  Patient On (Oxygen Delivery Method): BiPAP/CPAP        PHYSICAL EXAM:  General: NAD, alert   ENT: MMM  Neck: Supple, No JVD  Lungs: diminished bilaterally, no crackles   Cardio: RRR, S1/S2, No murmurs  Abdomen: Soft, Nontender, Nondistended; Bowel sounds present  Extremities: No cyanosis, No edema, muscle atrophy  NEUROLOGIC:  does not follow commands   PSYCH: lethargic, nonverbal.    LABS:  cret                        9.3    6.82  )-----------( 404      ( 08 Jul 2024 07:33 )             29.7     07-08    137  |  100  |  20  ----------------------------<  137<H>  4.7   |  26  |  0.5<L>    Ca    8.8      08 Jul 2024 07:33  Mg     2.5     07-08    MedsMEDICATIONS  (STANDING):  albuterol/ipratropium for Nebulization 3 milliLiter(s) Nebulizer every 6 hours  bacitracin   Ointment 1 Application(s) Topical every 8 hours  calcium carbonate   1250 mG (OsCal) 1 Tablet(s) Oral daily  collagenase Ointment 1 Application(s) Topical two times a day  enoxaparin Injectable 30 milliGRAM(s) SubCutaneous every 24 hours  gabapentin 300 milliGRAM(s) Oral two times a day  levETIRAcetam  Solution 750 milliGRAM(s) Oral two times a day  melatonin 3 milliGRAM(s) Oral at bedtime  midodrine 10 milliGRAM(s) Oral every 8 hours  pantoprazole   Suspension 40 milliGRAM(s) Enteral Tube daily  raloxifene 60 milliGRAM(s) Oral daily  sodium chloride 3%  Inhalation 4 milliLiter(s) Inhalation two times a day    MEDICATIONS  (PRN):  acetaminophen     Tablet .. 650 milliGRAM(s) Oral every 6 hours PRN Temp greater or equal to 38C (100.4F), Mild Pain (1 - 3)  aluminum hydroxide/magnesium hydroxide/simethicone Suspension 30 milliLiter(s) Oral every 4 hours PRN Dyspepsia  ondansetron Injectable 4 milliGRAM(s) IV Push every 8 hours PRN Nausea and/or Vomiting

## 2024-07-09 NOTE — PROGRESS NOTE ADULT - NUTRITIONAL ASSESSMENT
This patient has been assessed with a concern for Malnutrition and has been determined to have a diagnosis/diagnoses of Severe protein-calorie malnutrition.    This patient is being managed with:   Diet NPO with Tube Feed-  Tube Feeding Modality: Gastrostomy  Jevity 1.2 Juventino (JEVITY1.2RTH)  Total Volume for 24 Hours (mL): 1300  Bolus  Total Volume of Bolus (mL):  325  Total # of Feeds: 4  Tube Feed Frequency: Every 6 hours   Tube Feed Start Time: 00:00  Bolus Feed Rate (mL per Hour): 325   Bolus Feed Duration (in Hours): 1  Free Water Flush   Total Volume per Flush (mL): 150   Frequency: Every 6 Hours  Free Water Flush Instructions:  50 mL free water flushes pre/post feeds  Entered: Jun 30 2024  1:44PM  

## 2024-07-09 NOTE — PROGRESS NOTE ADULT - ATTENDING COMMENTS
Patient seen and examined independently.    GENERAL: NAD, lying comfortably in bed  LUNG: CTAB  HEART: Regular rate and rhythm, No murmurs  ABDOMEN: Soft, nontender, nondistended  EXTREMITIES:  No edema  NERVOUS SYSTEM:  lethargic  SKIN: No rashes or lesions       58F with PMH including CP, anemia, Down's syndrome, seizures, OP, hypotension, nonverbal at at baseline, here with respiratory distress, and found to have acute hypoxic respiratory failure from PNA, possibly from aspiration, admitted to medicine stepdown on pressors and IV abx.    Acute Hypoxemic Respiratory Failure due to suspected Aspiration PNA, recurrent  Septic shock (POA)  Hx Bacteremia with ESBL Proteus and VR E fecalis and fecium.  Hx of Cerebral Palsy/ Down syndrome/ Nonverbal at baseline   Elevated lactate  -  CTA chest:  a) reported Diffuse, near complete left lower lobe and upper lobe mixed consolidation and atelectasis, with some remaining aeration to left upper lobe.                        b) Occlusion of right mainstem bronchus; correlation for aspiration suggested.                        c) Near complete resolution of right lung airspace opacification, with  few residual likely inflammatory/infectious ground-glass airspace opacities.                        d) No evidence of acute pulmonary embolism.  - on nc   - midodrine 10mg q8hrs  - so far, Ucx and blood cx NGTD, procal 0.6  - appreciate ID eval, c.w meropenem and Zyvox, dc doxycycline--plan for 10 days today of antibiotics - last day of antibiotics 7/5  - Chest PT via vest, nebs, daily chest xrays   - strict aspiration precautions, resume PEG feeds   - palliative team following, patient is currently DNR/DNI  - GI consult appreciated - GJ tube was not recommended for a way to prevent aspiration     Seizure - c/w Keppra     Hx of duodenal perforation - Cont PPI    Foot pressure ulcers - wound care appreciated, burn consult pending      #intermittent tachycardia  prior EKG showed sinus tachycardia  dehydration was on IV fluids now doing better  - Will monitor      OVerall prognosis is poor, high risk of decompensation  Pending: monitor BP, c/w IV abx, burn consult , oxygen weaning  .   Dispo: Group Home
***My note supersedes any discrepancies that may be above in the resident's note***    58F with PMH including CP, anemia, Down's syndrome, seizures, OP, hypotension, nonverbal at at baseline, here with respiratory distress, and found to have acute hypoxic respiratory failure from PNA, possibly from aspiration, admitted to medicine stepdown on pressors and IV abx, now off pressors and on general medical floor.    #Acute Hypoxemic Respiratory Failure due to suspected Aspiration PNA, recurrent  #Septic shock (POA)  #Hx Bacteremia with ESBL Proteus and VR E fecalis and fecium  #Hx of Cerebral Palsy/ Down syndrome/ Nonverbal at baseline  - Last Lactate 1.5 (29/6) down from 2.7, 3.9, 5.4.  - CTA chest (6/27/24):       a) reported Diffuse, near complete left lower lobe and upper lobe mixed consolidation and atelectasis, with some remaining aeration to left upper lobe.     b) Occlusion of right mainstem bronchus; correlation for aspiration suggested.     c) Near complete resolution of right lung airspace opacification, with  few residual likely inflammatory/infectious ground-glass airspace opacities.        d) No evidence of acute pulmonary embolism.  - Weaned down from venturi mask (7/1/24) wean down to 3L today and satble  - Midodrine 10mg q8hrs  - so far, Ucx and blood cx NGTD, procal 0.6  -s/p meropenem and Zyvox (switched to PO, per ID recommendations 7/1/24); last dose 7/5  - strict aspiration precautions, resume PEG feeds   - palliative team following: patient is already DNR/DNI, MOLST checklist completed on prior admission with CAB and LS  - GI consulted to consider G_Jtube  - Pt need consent from CAB for PEJ. Following up with , once consent is received reach out to GI so they can plan the procedure.  - 7/6/24 bladder scans ~600, straight cath overnight. f/u bladder scans    #Seizure   - c/w Keppra     #Hx of duodenal perforation  - Continue PPI     #Pressure ulcers, bilaterally  - Dressing changed this AM 7/3/24  - Wound care consulted, appreciate recs  - Podiatry consulted, appreciate recs      #Progress Note Handoff  Pending (specify):  meeting today at 3:30 RE consent for PEJ  Disposition: Unknown at this time________
patient seen and examined agree above note   HR 61 during exam   off pressors since yesterday   taper oxygen to keep pox >92%   Downgrade to floor

## 2024-07-10 ENCOUNTER — TRANSCRIPTION ENCOUNTER (OUTPATIENT)
Age: 59
End: 2024-07-10

## 2024-07-10 VITALS
RESPIRATION RATE: 18 BRPM | TEMPERATURE: 98 F | DIASTOLIC BLOOD PRESSURE: 70 MMHG | HEART RATE: 63 BPM | SYSTOLIC BLOOD PRESSURE: 122 MMHG

## 2024-07-10 PROCEDURE — 99239 HOSP IP/OBS DSCHRG MGMT >30: CPT

## 2024-07-10 PROCEDURE — 74018 RADEX ABDOMEN 1 VIEW: CPT | Mod: 26

## 2024-07-10 RX ORDER — SODIUM CHLORIDE 0.9 % (FLUSH) 0.9 %
4 SYRINGE (ML) INJECTION
Qty: 240 | Refills: 0
Start: 2024-07-10 | End: 2024-08-08

## 2024-07-10 RX ORDER — NYSTATIN 100000/G
1 POWDER (GRAM) TOPICAL
Qty: 1 | Refills: 2
Start: 2024-07-10 | End: 2024-10-07

## 2024-07-10 RX ORDER — ACETAMINOPHEN 325 MG
2 TABLET ORAL
Qty: 0 | Refills: 0 | DISCHARGE
Start: 2024-07-10

## 2024-07-10 RX ADMIN — Medication 1 TABLET(S): at 15:29

## 2024-07-10 RX ADMIN — ENOXAPARIN SODIUM 30 MILLIGRAM(S): 100 INJECTION SUBCUTANEOUS at 13:15

## 2024-07-10 RX ADMIN — Medication 300 MILLIGRAM(S): at 05:51

## 2024-07-10 RX ADMIN — IPRATROPIUM BROMIDE AND ALBUTEROL SULFATE 3 MILLILITER(S): .5; 3 SOLUTION RESPIRATORY (INHALATION) at 08:25

## 2024-07-10 RX ADMIN — Medication 1 APPLICATION(S): at 05:53

## 2024-07-10 RX ADMIN — MIDODRINE HYDROCHLORIDE 10 MILLIGRAM(S): 10 TABLET ORAL at 13:16

## 2024-07-10 RX ADMIN — RALOXIFENE HYDROCHLORIDE 60 MILLIGRAM(S): 60 TABLET, FILM COATED ORAL at 13:16

## 2024-07-10 RX ADMIN — PANTOPRAZOLE SODIUM 40 MILLIGRAM(S): 40 INJECTION, POWDER, FOR SOLUTION INTRAVENOUS at 13:16

## 2024-07-10 RX ADMIN — Medication 1 APPLICATION(S): at 05:50

## 2024-07-10 RX ADMIN — LEVETIRACETAM 750 MILLIGRAM(S): 100 INJECTION INTRAVENOUS at 05:50

## 2024-07-10 RX ADMIN — IPRATROPIUM BROMIDE AND ALBUTEROL SULFATE 3 MILLILITER(S): .5; 3 SOLUTION RESPIRATORY (INHALATION) at 14:59

## 2024-07-10 RX ADMIN — MIDODRINE HYDROCHLORIDE 10 MILLIGRAM(S): 10 TABLET ORAL at 05:50

## 2024-07-10 RX ADMIN — Medication 1 APPLICATION(S): at 13:16

## 2024-07-10 NOTE — DISCHARGE NOTE PROVIDER - HOSPITAL COURSE
57 yo F with hx of down syndrome, anemia, cerebral palsy, seizure disorder, osteoporosis, hypotension on midodrine, nonverbal at baseline who was BIBEMS from group home for respiratory distress with complaints of fever and hypoxia. Per EMS, pt was hypoxic to 80s and was put on non-rebreather mask. Pt was admitted recently from 5/31 - 6/13 for septic shock and acute hypoxic and hypercapnic respiratory failure 2/2 recurrent CAP. On arrival patient was tachycardic to 137. was febrile to 102.4 F and was tachypneic to 30s. The patient was put on AVAPS with patient saturating 92 %, was given STAT dose of cefepime and vancomycin, got 1250 cc of bolus (30 cc/kg per sepsis protocol).  ABG was done which showed pH of 7.4 with pO2 of 81 and Pco2 of 42 with lactate of 2.8.     #Acute Hypoxemic Respiratory Failure due to suspected Aspiration PNA, recurrent  Patient had severe sepsis on admission due to recurrent aspiration PNA  She was treated by infectious disease with zyvox and meropenum   completed antibiotics  GI feels a G_J tube would be of no benefit to pt and there not offered     #Foot pressure ulcers -  podiatry eval appreciated , local care     #intermittent tachycardia  -thyroid level within normal limits     #Overall prognosis is poor, high risk of decompensation    #GOC  -pt is dnr/dni  -palliative care was consulted - pt is not a candidate for comfort measure as the recurrent aspiration PNAs are curable     Discharge to group home

## 2024-07-10 NOTE — DISCHARGE NOTE PROVIDER - NSDCFUSCHEDAPPT_GEN_ALL_CORE_FT
Royer Cooper  [facility] PreAdmits  Scheduled Appointment: 07/30/2024    Royer Cooper Physician [address]  Scheduled Appointment: 07/30/2024

## 2024-07-10 NOTE — DISCHARGE NOTE PROVIDER - NSDCMRMEDTOKEN_GEN_ALL_CORE_FT
acetaminophen 325 mg oral tablet: 2 tab(s) orally every 6 hours As needed Temp greater or equal to 38C (100.4F), Mild Pain (1 - 3)  gabapentin 250 mg/5 mL oral solution: 6 milliliter(s) orally every 12 hours  ipratropium-albuterol 0.5 mg-2.5 mg/3 mL inhalation solution: 3 milliliter(s) inhaled every 6 hours as needed for  shortness of breath and/or wheezing  Keppra 100 mg/mL oral solution: 7.5 milliliter(s) orally 2 times a day by peg  Melatonin 3 mg oral tablet: 1 tab(s) by PEG tube once a day (at bedtime)  midodrine 10 mg oral tablet: 1 tab(s) orally 3 times a day Please discontinue 5mg TID dose, and start 10mg TID dose  nystatin 100,000 units/g topical powder: Apply topically to affected area 3 times a day 1 Apply topically to affected area 3 times a day  ocular lubricant ophthalmic solution: 1 drop(s) to each affected eye 2 times a day  Oyster Shell 500 (1250 mg calcium carbonate) oral tablet: 1 tab(s) by PEG tube once a day  Pepcid 40 mg oral tablet: 1 tab(s) by PEG tube 2 times a day  petrolatum topical ointment: 1 Apply topically to affected area 2 times a day  Protonix 40 mg oral delayed release tablet: 1 tab(s) by PEG tube once a day  raloxifene 60 mg oral tablet: 1 tab(s) by PEG tube once a day  senna leaf extract oral tablet: 2 tab(s) orally once a day (at bedtime)  sodium chloride 3% inhalation solution: 4 milliliter(s) inhaled 2 times a day

## 2024-07-10 NOTE — DISCHARGE NOTE PROVIDER - INSTRUCTIONS
Jevity 1.2 marisol - total volume 1300cc in 24 hrs.  Bolus 325ml 4 times a day - tube feed every 6 hours

## 2024-07-10 NOTE — DISCHARGE NOTE NURSING/CASE MANAGEMENT/SOCIAL WORK - PATIENT PORTAL LINK FT
You can access the FollowMyHealth Patient Portal offered by [facility] by registering at the following website: [email or web address]. By joining [email or web address]’s FollowMyHealth portal, you will also be able to view your health information using other applications (apps) compatible with our system.

## 2024-07-10 NOTE — DISCHARGE NOTE PROVIDER - ATTENDING DISCHARGE PHYSICAL EXAMINATION:
Vital Signs Last 24 Hrs  T(F): 96.8 (10 Jul 2024 05:41), Max: 99.8 (09 Jul 2024 12:35)  HR: 78 (10 Jul 2024 05:41) (78 - 101)  BP: 99/58 (10 Jul 2024 05:41) (94/60 - 114/84)  RR: 19 (10 Jul 2024 05:41) (18 - 19)  SpO2: 96% (09 Jul 2024 12:35) (96% - 96%)    PHYSICAL EXAM:  GENERAL: NAD, poorly-groomed, poorly-developed  HEAD:  Atraumatic, Normocephalic  EYES: EOMI, conjunctiva and sclera clear  ENMT: Moist mucous membranes, Good dentition, no thrush  NECK: Supple, No JVD  CHEST/LUNG: diminished, left sided crackle  HEART: RRR; S1/S2, 2/6 systolic murmur   ABDOMEN: Soft, Nontender, Nondistended; Bowel sounds present  VASCULAR: Normal pulses, Normal capillary refill  EXTREMITIES: No calf tenderness, No cyanosis, No edema, muscle atrophy   LYMPH: Normal; No lymphadenopathy noted  SKIN: Warm, Intact  PSYCH: Normal mood, Normal affect  NERVOUS SYSTEM:  Alert, does not follow directions at baseline

## 2024-07-10 NOTE — PHARMACOTHERAPY INTERVENTION NOTE - COMMENTS
Recommended modifying Linezolid order so last day of therapy is 7/5 (10 days of total treatment), as per IDs recommendations.     Analilia Prieto, PharmD  Clinical Pharmacy Specialist, Infectious Diseases  Tele-Antimicrobial Stewardship Program (Tele-ASP)  Tele-ASP Phone: [phone] 
S/w resident that Santyl currently restricted to burn/ wound care consult. WC note on 7/2 does not mention santyl. Informed resident Dr. Emir Andrade and as pt is getting d/c'ed today, he will d/c santyl order.    Karen Washburn, PharmD  Clinical Pharmacy Specialist
Recommended modifying meropenem order so last day of therapy is 7/5 (10 days of total treatment), as per IDs recommendations.     Analilia Prieto, PharmD  Clinical Pharmacy Specialist, Infectious Diseases  Tele-Antimicrobial Stewardship Program (Tele-ASP)  Tele-ASP Phone: [phone]

## 2024-07-10 NOTE — DISCHARGE NOTE PROVIDER - NSDCCPCAREPLAN_GEN_ALL_CORE_FT
PRINCIPAL DISCHARGE DIAGNOSIS  Diagnosis: Acute respiratory failure with hypoxia  Assessment and Plan of Treatment: You were admitted to the hospital with respiratory failure secondary to recurrent aspiration pna.  You were treated with antibiotics and have completed the course of antibiotics.  You were evaluated by gastroenterology and they did not find that changing your feeding tube would reduce the risk of these aspiration PNA.      SECONDARY DISCHARGE DIAGNOSES  Diagnosis: Sepsis  Assessment and Plan of Treatment:

## 2024-07-10 NOTE — DISCHARGE NOTE PROVIDER - DETAILS OF MALNUTRITION DIAGNOSIS/DIAGNOSES
This patient has been assessed with a concern for Malnutrition and was treated during this hospitalization for the following Nutrition diagnosis/diagnoses:     -  06/30/2024: Severe protein-calorie malnutrition

## 2024-07-16 DIAGNOSIS — A41.59 OTHER GRAM-NEGATIVE SEPSIS: ICD-10-CM

## 2024-07-16 DIAGNOSIS — Q90.9 DOWN SYNDROME, UNSPECIFIED: ICD-10-CM

## 2024-07-16 DIAGNOSIS — Z86.718 PERSONAL HISTORY OF OTHER VENOUS THROMBOSIS AND EMBOLISM: ICD-10-CM

## 2024-07-16 DIAGNOSIS — Z51.5 ENCOUNTER FOR PALLIATIVE CARE: ICD-10-CM

## 2024-07-16 DIAGNOSIS — R65.21 SEVERE SEPSIS WITH SEPTIC SHOCK: ICD-10-CM

## 2024-07-16 DIAGNOSIS — J96.01 ACUTE RESPIRATORY FAILURE WITH HYPOXIA: ICD-10-CM

## 2024-07-16 DIAGNOSIS — J69.8 PNEUMONITIS DUE TO INHALATION OF OTHER SOLIDS AND LIQUIDS: ICD-10-CM

## 2024-07-16 DIAGNOSIS — J98.11 ATELECTASIS: ICD-10-CM

## 2024-07-16 DIAGNOSIS — Z11.52 ENCOUNTER FOR SCREENING FOR COVID-19: ICD-10-CM

## 2024-07-16 DIAGNOSIS — K44.9 DIAPHRAGMATIC HERNIA WITHOUT OBSTRUCTION OR GANGRENE: ICD-10-CM

## 2024-07-16 DIAGNOSIS — D64.9 ANEMIA, UNSPECIFIED: ICD-10-CM

## 2024-07-16 DIAGNOSIS — Z88.8 ALLERGY STATUS TO OTHER DRUGS, MEDICAMENTS AND BIOLOGICAL SUBSTANCES: ICD-10-CM

## 2024-07-16 DIAGNOSIS — M81.0 AGE-RELATED OSTEOPOROSIS WITHOUT CURRENT PATHOLOGICAL FRACTURE: ICD-10-CM

## 2024-07-16 DIAGNOSIS — Z66 DO NOT RESUSCITATE: ICD-10-CM

## 2024-07-16 DIAGNOSIS — E11.9 TYPE 2 DIABETES MELLITUS WITHOUT COMPLICATIONS: ICD-10-CM

## 2024-07-16 DIAGNOSIS — E43 UNSPECIFIED SEVERE PROTEIN-CALORIE MALNUTRITION: ICD-10-CM

## 2024-07-16 DIAGNOSIS — Z79.01 LONG TERM (CURRENT) USE OF ANTICOAGULANTS: ICD-10-CM

## 2024-07-16 DIAGNOSIS — G80.9 CEREBRAL PALSY, UNSPECIFIED: ICD-10-CM

## 2024-07-16 DIAGNOSIS — G40.909 EPILEPSY, UNSPECIFIED, NOT INTRACTABLE, WITHOUT STATUS EPILEPTICUS: ICD-10-CM

## 2024-07-16 NOTE — ED ADULT NURSE NOTE - CAS EDN DISCHARGE ASSESSMENT
Dr. Grove given update. FFN negative, UA shows 2+ ketones and 4+ leukocytes. Ctx have spaced q 3-5 min. Orders received for dose of terb.    Patient baseline mental status

## 2024-07-30 ENCOUNTER — APPOINTMENT (OUTPATIENT)
Dept: OPHTHALMOLOGY | Facility: CLINIC | Age: 59
End: 2024-07-30

## 2024-08-02 NOTE — ED ADULT NURSE NOTE - OBJECTIVE STATEMENT
197.9
58 yo pt bibems from living facility pt hypoxic, and respiratory distress placed on high flow. respiratory at beside

## 2024-08-02 NOTE — ED ADULT NURSE NOTE - NSSEPSISCRITERIAMET_ED_A_ED
Abnormal VS & WBC/Abnormal Lactate Abnormal VS & WBC/Abnormal Lactate/Abnormal Bands I have personally seen and examined this patient with the resident/fellow.  I have fully participated in the care of this patient. I have reviewed all pertinent clinical information, including history, physical exam, plan and the Resident/Fellow’s note and agree except as noted. See MDM

## 2024-08-03 NOTE — ED ADULT NURSE REASSESSMENT NOTE - NS ED NURSE REASSESS COMMENT FT1
pts name was corrected when aide came in. orginal orders not merged w/ corrected name. pt given meds under prior name, name changed before RN able to document.

## 2024-08-03 NOTE — ED PROVIDER NOTE - CLINICAL SUMMARY MEDICAL DECISION MAKING FREE TEXT BOX
59-year-old presented today for evaluation of acute hypoxia and difficulty breathing.  Patient is in respiratory distress however DNR/DNI.  Started on high flow nasal cannula given vomiting.  PEG tube set to suction to aid in decompression of feeds and bowel.  Patient switched to BiPAP to assist with inspiratory support.  Patient was not tolerating BiPAP and was switched back to high flow nasal cannula.  There are limited modalities of noninvasive ventilation at this time given patient's current clinical condition with vomiting.  Patient was treated as sepsis with judicious fluid administration given respiratory distress, aspiration PNA and concern for fluid overload. Abdominal exam benign with no guarding, rigidity or ttp. PEG tube present and connected to suction. Patient was admitted to ICU for further evaluation and care.    Attending Statement: I have personally provided the amount of critical care time documented below excluding time spent on separate procedures.     Critical Care Time Spent (min) Must be 30 or more minutes to qualify: 75

## 2024-08-03 NOTE — CONSULT NOTE ADULT - SUBJECTIVE AND OBJECTIVE BOX
Gastroenterology Consultation:    Patient is a 59y old  Female who presents with a chief complaint of       Admitted on: 08-03-24      HPI:  HPI : 59 yo F with pmhx of down syndrome, anemia, cerebral palsy, seizure disorder, osteoporosis, hypotension on midodrine, nonverbal at baseline who was BIBEMS from group home for  respiratory distress with complaints of fever and hypoxia that began earlier in the evening. Pt was recently admitted (June 26-July 10) for AHFH due to suspected recurrent aspiration pna, had severe sepsis on admission, was treated by infectious disease with zyvox and meropenum. En route patient was placed on NRB at 15lpm with saturations in the 70's.     Vital Signs Last 24 Hrs  T(F): 104.2 (03 Aug 2024 01:30), Max: 104.2 (03 Aug 2024 01:30)  HR: 160 (03 Aug 2024 02:20) (160 - 171)  BP: 131/68 (03 Aug 2024 02:20) (131/68 - 131/68)    Upon arrival to ED, pt was placed on HFNC and desatted to 50s, switched to BIPAP. Given high risk of recurrent aspiration, pt was suctioned and  switched over back to HFNC now satting 98%. Pt became hypotensive MAP 57, started on Phenylephrine in the ED.    Labs: WBC 29K, Lactate 3> 3.9> 5.1   CXR: RLL consolidation (fu official read)    s/p 2L IVF, Cefepime and Vanc in ED    Admitted for Sepsis POA and AHRF 2/2 Aspiration PNA    (03 Aug 2024 02:55)        Prior EGD:    Prior Colonoscopy:      PAST MEDICAL & SURGICAL HISTORY:  Down syndrome      No significant past surgical history            FAMILY HISTORY:  No pertinent family history in first degree relatives        Social History:  Tobacco:  Alcohol:  Drugs:    Home Medications:  calcium-vitamin D: 2 times a day 500-200 tablet (03 Aug 2024 04:42)  famotidine 40 mg oral tablet: 1 tab(s) orally once a day (03 Aug 2024 04:31)  gabapentin 250 mg/5 mL oral solution: 6 milliliter(s) orally 2 times a day (03 Aug 2024 04:31)  ipratropium-albuterol 0.5 mg-2.5 mg/3 mL inhalation solution: 3 milliliter(s) by nebulizer (03 Aug 2024 04:38)  Keppra 100 mg/mL intravenous solution: 1,000 milligram(s) intravenously 2 times a day 7.5ml every 12 hours (03 Aug 2024 04:48)  melatonin 3 mg oral tablet: 1 tab(s) orally once a day (03 Aug 2024 04:37)  midodrine 10 mg oral tablet: 1 tab(s) orally 3 times a day (03 Aug 2024 04:33)  Protonix 40 mg oral delayed release tablet: 1 tab(s) orally once a day (03 Aug 2024 04:35)  raloxifene 60 mg oral tablet: 1 tab(s) orally once a day (03 Aug 2024 04:35)  Senexon-S 50 mg-8.6 mg oral tablet: 2 tab(s) orally once a day (at bedtime) (03 Aug 2024 04:39)        MEDICATIONS  (STANDING):  gabapentin 300 milliGRAM(s) Oral two times a day  hydrocortisone sodium succinate Injectable 100 milliGRAM(s) IV Push every 8 hours  levETIRAcetam  IVPB 750 milliGRAM(s) IV Intermittent two times a day  linezolid  IVPB 600 milliGRAM(s) IV Intermittent every 12 hours  midodrine. 10 milliGRAM(s) Oral three times a day  norepinephrine Infusion 0.05 MICROgram(s)/kG/Min (2.2 mL/Hr) IV Continuous <Continuous>  pantoprazole  Injectable 40 milliGRAM(s) IV Push every 12 hours  phenylephrine    Infusion 5 MICROgram(s)/kG/Min (44 mL/Hr) IV Continuous <Continuous>  sodium bicarbonate  Infusion 0.48 mEq/kG/Hr (150 mL/Hr) IV Continuous <Continuous>  vasopressin Infusion 0.04 Unit(s)/Min (6 mL/Hr) IV Continuous <Continuous>    MEDICATIONS  (PRN):      Allergies  No Known Allergies      Review of Systems:   Constitutional:  No Fever, No Chills  ENT/Mouth:  No Hearing Changes,  No Difficulty Swallowing  Eyes:  No Eye Pain, No Vision Changes  Cardiovascular:  No Chest Pain, No Palpitations  Respiratory:  No Cough, No Dyspnea  Gastrointestinal:  As described in HPI          Physical Examination:  T(C): 38.5 (08-03-24 @ 14:00), Max: 40.1 (08-03-24 @ 01:30)  HR: 138 (08-03-24 @ 14:30) (129 - 171)  BP: 99/58 (08-03-24 @ 14:30) (46/52 - 153/79)  RR: 28 (08-03-24 @ 14:39) (26 - 49)  SpO2: 98% (08-03-24 @ 14:39) (79% - 100%)  Height (cm): 142.2 (08-03-24 @ 12:30)  Weight (kg): 47.1 (08-03-24 @ 12:30)    08-03-24 @ 07:01  -  08-03-24 @ 14:57  --------------------------------------------------------  IN: 1836.9 mL / OUT: 500 mL / NET: 1336.9 mL          GENERAL: AAOx3, no acute distress.  HEAD:  Atraumatic, Normocephalic  EYES: conjunctiva and sclera clear  CHEST/LUNG: Clear to auscultation bilaterally; No wheeze, rhonchi, or rales  HEART: Regular rate and rhythm; normal S1, S2, No murmurs.  ABDOMEN: Soft, nontender, nondistended; Bowel sounds present  SKIN: Intact, no jaundice        Data:                        12.5   16.40 )-----------( 227      ( 03 Aug 2024 11:35 )             40.6     Hgb Trend:  12.5  08-03-24 @ 11:35  9.7  08-03-24 @ 09:03  12.9  08-03-24 @ 00:32      08-03-24 @ 07:01  -  08-03-24 @ 14:57  --------------------------------------------------------  IN: 313 mL      08-03    142  |  105  |  32<H>  ----------------------------<  150<H>  5.6<H>   |  19  |  1.2    Ca    7.8<L>      03 Aug 2024 11:35  Mg     1.6     08-03    TPro  5.4<L>  /  Alb  2.6<L>  /  TBili  1.0  /  DBili  x   /  AST  82<H>  /  ALT  29  /  AlkPhos  80  08-03    Liver panel trend:  TBili 1.0   /   AST 82   /   ALT 29   /   AlkP 80   /   Tptn 5.4   /   Alb 2.6    /   DBili --      08-03  TBili 0.8   /   AST 37   /   ALT 18   /   AlkP 70   /   Tptn 4.9   /   Alb 2.2    /   DBili --      08-03  TBili 0.3   /   AST 24   /   ALT 23   /   AlkP 136   /   Tptn 7.4   /   Alb 3.4    /   DBili --      08-03      PT/INR - ( 03 Aug 2024 00:32 )   PT: 12.50 sec;   INR: 1.10 ratio         PTT - ( 03 Aug 2024 00:32 )  PTT:26.8 sec    Urinalysis with Rflx Culture (collected 03 Aug 2024 08:42)          Radiology:       Gastroenterology Consultation:    Patient is a 59y old  Female who presents with a chief complaint of       Admitted on: 08-03-24      HPI:   59 yo F with pmhx of down syndrome, anemia, cerebral palsy, seizure disorder, osteoporosis, dysphagia s/p PEG hypotension on midodrine, nonverbal at baseline who was BIBEMS from group home for  respiratory distress with complaints of fever and hypoxia that began earlier in the evening. Pt was recently admitted (June 26-July 10) for AHFH due to suspected recurrent aspiration pna, had severe sepsis on admission.Admitted for Sepsis shock and AHRF s/p intubation on multiple pressors. GI consulted for acute drop in hb .         Prior EGD: none documented    Prior Colonoscopy:none documented      PAST MEDICAL & SURGICAL HISTORY:  Down syndrome      No significant past surgical history            FAMILY HISTORY:  No pertinent family history in first degree relatives        Social History: unknonwn  Tobacco:  Alcohol:  Drugs:    Home Medications:  calcium-vitamin D: 2 times a day 500-200 tablet (03 Aug 2024 04:42)  famotidine 40 mg oral tablet: 1 tab(s) orally once a day (03 Aug 2024 04:31)  gabapentin 250 mg/5 mL oral solution: 6 milliliter(s) orally 2 times a day (03 Aug 2024 04:31)  ipratropium-albuterol 0.5 mg-2.5 mg/3 mL inhalation solution: 3 milliliter(s) by nebulizer (03 Aug 2024 04:38)  Keppra 100 mg/mL intravenous solution: 1,000 milligram(s) intravenously 2 times a day 7.5ml every 12 hours (03 Aug 2024 04:48)  melatonin 3 mg oral tablet: 1 tab(s) orally once a day (03 Aug 2024 04:37)  midodrine 10 mg oral tablet: 1 tab(s) orally 3 times a day (03 Aug 2024 04:33)  Protonix 40 mg oral delayed release tablet: 1 tab(s) orally once a day (03 Aug 2024 04:35)  raloxifene 60 mg oral tablet: 1 tab(s) orally once a day (03 Aug 2024 04:35)  Senexon-S 50 mg-8.6 mg oral tablet: 2 tab(s) orally once a day (at bedtime) (03 Aug 2024 04:39)        MEDICATIONS  (STANDING):  gabapentin 300 milliGRAM(s) Oral two times a day  hydrocortisone sodium succinate Injectable 100 milliGRAM(s) IV Push every 8 hours  levETIRAcetam  IVPB 750 milliGRAM(s) IV Intermittent two times a day  linezolid  IVPB 600 milliGRAM(s) IV Intermittent every 12 hours  midodrine. 10 milliGRAM(s) Oral three times a day  norepinephrine Infusion 0.05 MICROgram(s)/kG/Min (2.2 mL/Hr) IV Continuous <Continuous>  pantoprazole  Injectable 40 milliGRAM(s) IV Push every 12 hours  phenylephrine    Infusion 5 MICROgram(s)/kG/Min (44 mL/Hr) IV Continuous <Continuous>  sodium bicarbonate  Infusion 0.48 mEq/kG/Hr (150 mL/Hr) IV Continuous <Continuous>  vasopressin Infusion 0.04 Unit(s)/Min (6 mL/Hr) IV Continuous <Continuous>    MEDICATIONS  (PRN):      Allergies  No Known Allergies      Review of Systems:   Constitutional:  No Fever, No Chills  ENT/Mouth:  No Hearing Changes,  No Difficulty Swallowing  Eyes:  No Eye Pain, No Vision Changes  Cardiovascular:  No Chest Pain, No Palpitations  Respiratory:  No Cough, No Dyspnea  Gastrointestinal:  As described in HPI          Physical Examination:  T(C): 38.5 (08-03-24 @ 14:00), Max: 40.1 (08-03-24 @ 01:30)  HR: 138 (08-03-24 @ 14:30) (129 - 171)  BP: 99/58 (08-03-24 @ 14:30) (46/52 - 153/79)  RR: 28 (08-03-24 @ 14:39) (26 - 49)  SpO2: 98% (08-03-24 @ 14:39) (79% - 100%)  Height (cm): 142.2 (08-03-24 @ 12:30)  Weight (kg): 47.1 (08-03-24 @ 12:30)    08-03-24 @ 07:01  -  08-03-24 @ 14:57  --------------------------------------------------------  IN: 1836.9 mL / OUT: 500 mL / NET: 1336.9 mL          GENERAL: AAOx0 ;intubated and sedated  HEAD:  Atraumatic, Normocephalic  EYES: conjunctiva and sclera clear  CHEST/LUNG: Clear to auscultation bilaterally; No wheeze, rhonchi, or rales  HEART: Regular rate and rhythm; normal S1, S2  ABDOMEN: Soft, nontender, nondistended; Bowel sounds present; PEG +  SKIN: Intact, no jaundice        Data:                        12.5   16.40 )-----------( 227      ( 03 Aug 2024 11:35 )             40.6     Hgb Trend:  12.5  08-03-24 @ 11:35  9.7  08-03-24 @ 09:03  12.9  08-03-24 @ 00:32      08-03-24 @ 07:01  -  08-03-24 @ 14:57  --------------------------------------------------------  IN: 313 mL      08-03    142  |  105  |  32<H>  ----------------------------<  150<H>  5.6<H>   |  19  |  1.2    Ca    7.8<L>      03 Aug 2024 11:35  Mg     1.6     08-03    TPro  5.4<L>  /  Alb  2.6<L>  /  TBili  1.0  /  DBili  x   /  AST  82<H>  /  ALT  29  /  AlkPhos  80  08-03    Liver panel trend:  TBili 1.0   /   AST 82   /   ALT 29   /   AlkP 80   /   Tptn 5.4   /   Alb 2.6    /   DBili --      08-03  TBili 0.8   /   AST 37   /   ALT 18   /   AlkP 70   /   Tptn 4.9   /   Alb 2.2    /   DBili --      08-03  TBili 0.3   /   AST 24   /   ALT 23   /   AlkP 136   /   Tptn 7.4   /   Alb 3.4    /   DBili --      08-03      PT/INR - ( 03 Aug 2024 00:32 )   PT: 12.50 sec;   INR: 1.10 ratio         PTT - ( 03 Aug 2024 00:32 )  PTT:26.8 sec    Urinalysis with Rflx Culture (collected 03 Aug 2024 08:42)          Radiology:

## 2024-08-03 NOTE — H&P ADULT - ASSESSMENT
59 yo F with pmhx of down syndrome, anemia, cerebral palsy, seizure disorder, osteoporosis, hypotension on midodrine, nonverbal at baseline who was BIBEMS from group home for  respiratory distress with complaints of fever and hypoxia that began earlier in the evening. Admitted for Sepsis POA and AHRF 2/2 Aspiration PNA    59 yo F with pmhx of down syndrome, anemia, cerebral palsy, seizure disorder, osteoporosis, hypotension on midodrine, nonverbal at baseline who was BIBEMS from group home for  respiratory distress with complaints of fever and hypoxia that began earlier in the evening. Admitted for Sepsis POA and AHRF 2/2 Aspiration PNA     #AHRF 2/2 recurrent Aspiration PNA   #Sepsis POA   -s/p Cefepime, Vanc in ED  -Started on Phenylephrine  -HFNC 60/100   -recent admission for AHRF treated with Meropenam and Zyvox  -C/w Meropenam and Vanc for now   -ID consult   -Fu Bcx, Sputum Cx, procal , mrsa nares   -admit to SDU     #Downsyndrome  #Nonverbal  #Cerebral palsy    #Seziure    #Osteoporosis      #DVT ppx  #Diet-NPO   #Dispo-SDU 59 yo F with pmhx of down syndrome, anemia, cerebral palsy, seizure disorder, osteoporosis, hypotension on midodrine, nonverbal at baseline who was BIBEMS from group home for  respiratory distress with complaints of fever and hypoxia that began earlier in the evening. Admitted for Sepsis POA and AHRF 2/2 Aspiration PNA     #AHRF 2/2 recurrent Aspiration PNA   #Sepsis POA   -s/p Cefepime, Vanc in ED  -Started on Phenylephrine  -HFNC 60/100   -recent admission for AHRF treated with Meropenam and Zyvox  -C/w Meropenam and Vanc for now   -ID consult   -Fu Bcx, Sputum Cx, procal , mrsa nares   -admit to SDU     #Downsyndrome  #Nonverbal  #Cerebral palsy    #Seziure    #Osteoporosis      #DVT ppx  #Diet-NPO   #Dispo-SDU    *On admission, registration made an error. To access previous records, will need to search up patient by name and .     Pending: ID, clinical improvement, wean pressors, infectious workup , HFNC 57 yo F with pmhx of down syndrome, anemia, cerebral palsy, seizure disorder, osteoporosis, hypotension on midodrine, nonverbal at baseline who was BIBEMS from group home for  respiratory distress with complaints of fever and hypoxia that began earlier in the evening. Admitted for Sepsis POA and AHRF 2/2 Aspiration PNA     #AHRF 2/2 recurrent Aspiration PNA   #Sepsis POA   -s/p Cefepime, Vanc in ED  -Started on Phenylephrine  -c/w home midodrine 10mg TID  -HFNC 60/100   -recent admission for AHRF treated with Meropenam and Zyvox  -C/w Meropenam and Vanc for now   -ID consult   -Fu Bcx, Sputum Cx, procal , mrsa nares   -admit to SDU     #Downsyndrome  #Nonverbal  #Cerebral palsy    #Seziure    #Osteoporosis      #DVT ppx  #Diet-NPO   #Dispo-SDU    *On admission, registration made an error. To access previous records, will need to search up patient by name and .     Pending: ID, clinical improvement, wean pressors, infectious workup , HFNC 59 yo F with pmhx of down syndrome, anemia, cerebral palsy, seizure disorder, osteoporosis, hypotension on midodrine, nonverbal at baseline who was BIBEMS from group home for  respiratory distress with complaints of fever and hypoxia that began earlier in the evening. Admitted for Sepsis POA and AHRF 2/2 Aspiration PNA     #AHRF 2/2 recurrent Aspiration PNA   #Sepsis POA   -s/p Cefepime, Vanc in ED  -Started on Phenylephrine  -c/w home midodrine 10mg TID  -HFNC 60/100   -recent admission for AHRF treated with Meropenam and Zyvox  -C/w Meropenam and Vanc for now   -ID consult   -Fu Bcx, Sputum Cx, procal , mrsa nares   -admit to SDU     #Downsyndrome  #Nonverbal  #Cerebral palsy  -c/w gabapentin  -c/w home meds    #Seziure  -C/w Keppra 1000 q12    #Osteoporosis  -c/w Oyster shell     #DVT ppx  #Diet-NPO   #Dispo-SDU    *On admission, registration made an error. To access previous records, will need to search up patient by name and .     Pending: ID, clinical improvement, wean pressors, infectious workup , HFNC 57 yo F with pmhx of down syndrome, anemia, cerebral palsy, seizure disorder, osteoporosis, hypotension on midodrine, nonverbal at baseline who was BIBEMS from group home for  respiratory distress with complaints of fever and hypoxia that began earlier in the evening. Admitted for Sepsis POA and AHRF 2/2 Aspiration PNA     #AHRF 2/2 recurrent Aspiration PNA   #Sepsis POA   -s/p Cefepime, Vanc in ED  -Started on Phenylephrine  -c/w home midodrine 10mg TID  -HFNC 60/100   -recent admission for AHRF treated with Meropenam and Zyvox  -C/w Meropenam and Vanc for now   -ID consult   -Fu Bcx, Sputum Cx, procal , mrsa nares   -admit to SDU     #Downsyndrome  #Nonverbal  #Cerebral palsy  -c/w gabapentin  -c/w home meds    #Seziure  -C/w Keppra 1000 q12    #Osteoporosis  -c/w home meds    #DVT ppx  #Diet-NPO   #Dispo-SDU    *On admission, registration made an error. To access previous records, will need to search up patient by name and .     Pending: ID, clinical improvement, wean pressors, infectious workup , HFNC

## 2024-08-03 NOTE — H&P ADULT - NSHPPHYSICALEXAM_GEN_ALL_CORE
CONSTITUTIONAL: Ill-appearing in moderate respiratory distress  CARD: tachycardic   RESP: diffuse crackles/rales and tachypneic   ABD: Soft, non-distended, non-tender  EXT: UE/LE contracted  NEURO: no focal deficits noted. non-verbal at baseline
2019

## 2024-08-03 NOTE — H&P ADULT - HISTORY OF PRESENT ILLNESS
HPI : Patient is a 58 year old female with PMH of down syndrome, anemia, cerebral palsy, seizure disorder, osteoporosis, hypotension on midodrine, nonverbal at baseline presenting for difficulty breathing. Per EMS, patient is coming from group home for respiratory distress with complaints of fever and hypoxia that began earlier in the evening. En route patient was placed on NRB at 15lpm with saturations in the 70's. No further history obtained.   HPI : 57 yo F with pmhx of down syndrome, anemia, cerebral palsy, seizure disorder, osteoporosis, hypotension on midodrine, nonverbal at baseline who was BIBEMS from group home for  respiratory distress with complaints of fever and hypoxia that began earlier in the evening. En route patient was placed on NRB at 15lpm with saturations in the 70's. Pt was recently admitted for [facility] due to suspected Aspiration PNA, was treated with Linezolid and Meropenam.     HPI : 57 yo F with pmhx of down syndrome, anemia, cerebral palsy, seizure disorder, osteoporosis, hypotension on midodrine, nonverbal at baseline who was BIBEMS from group home for  respiratory distress with complaints of fever and hypoxia that began earlier in the evening. En route patient was placed on NRB at 15lpm with saturations in the 70's. Pt was recently admitted (June 26-July 10) for AHFH due to suspected recurrent aspiration pna, had severe sepsis on admission, was treated by infectious disease with zyvox and meropenum.         HPI : 57 yo F with pmhx of down syndrome, anemia, cerebral palsy, seizure disorder, osteoporosis, hypotension on midodrine, nonverbal at baseline who was BIBEMS from group home for  respiratory distress with complaints of fever and hypoxia that began earlier in the evening. Pt was recently admitted (June 26-July 10) for AHFH due to suspected recurrent aspiration pna, had severe sepsis on admission, was treated by infectious disease with zyvox and meropenum. En route patient was placed on NRB at 15lpm with saturations in the 70's.     Vital Signs Last 24 Hrs  T(F): 104.2 (03 Aug 2024 01:30), Max: 104.2 (03 Aug 2024 01:30)  HR: 160 (03 Aug 2024 02:20) (160 - 171)  BP: 131/68 (03 Aug 2024 02:20) (131/68 - 131/68)    Upon arrival to ED, pt was placed on HFNC and desatted to 50s, switched to BIPAP. Given high risk of recurrent aspiration, pt was suctioned and  switched over back to HFNC now satting 98%. Pt became hypotensive MAP 57, started on Phenylephrine in the ED.    Labs: WBC 29K, Lactate 3> 3.9> 5.1   CXR: RLL consolidation (fu official read)    Admitted for Sepsis POA and AHRF 2/2 Aspiration PNA    HPI : 57 yo F with pmhx of down syndrome, anemia, cerebral palsy, seizure disorder, osteoporosis, hypotension on midodrine, nonverbal at baseline who was BIBEMS from group home for  respiratory distress with complaints of fever and hypoxia that began earlier in the evening. Pt was recently admitted (June 26-July 10) for AHFH due to suspected recurrent aspiration pna, had severe sepsis on admission, was treated by infectious disease with zyvox and meropenum. En route patient was placed on NRB at 15lpm with saturations in the 70's.     Vital Signs Last 24 Hrs  T(F): 104.2 (03 Aug 2024 01:30), Max: 104.2 (03 Aug 2024 01:30)  HR: 160 (03 Aug 2024 02:20) (160 - 171)  BP: 131/68 (03 Aug 2024 02:20) (131/68 - 131/68)    Upon arrival to ED, pt was placed on HFNC and desatted to 50s, switched to BIPAP. Given high risk of recurrent aspiration, pt was suctioned and  switched over back to HFNC now satting 98%. Pt became hypotensive MAP 57, started on Phenylephrine in the ED.    Labs: WBC 29K, Lactate 3> 3.9> 5.1   CXR: RLL consolidation (fu official read)    s/p 2L IVF, Cefepime and Vanc in ED    Admitted for Sepsis POA and AHRF 2/2 Aspiration PNA

## 2024-08-03 NOTE — CONSULT NOTE ADULT - CRITICAL CARE ATTENDING COMMENT
This patient is critically ill due to the following:  * Hemodynamic instability requiring titration of vasopressors or other vasoactive agents  * Respiratory instability requiring management of invasive ventilation  * Multiple organ failure requiring complex decision-making, and there is a high probability of imminent or life-threatening deterioration in the patient’s condition  * The patient required frequent reassessments and monitoring to ensure response to interventions and therapies.    Critical care time includes time spent evaluating and treating the patient's acute illness as well as time spent reviewing labs, radiology,  and discussing the case with a multidisciplinary team in an effort to prevent further life threatening deterioration or end organ damage. This time is independent of any procedures performed.
-The patient's injury acutely impairs one or more vital organ systems, and there is a high probability of imminent or life threatening deterioration in the patient's condition. The care of this patient requires complex medical decision making in the field of Infectious Diseases and extensive interpretation of laboratory, microbiological and radiographic data.

## 2024-08-03 NOTE — PROCEDURE NOTE - ADDITIONAL PROCEDURE DETAILS
Called for Emergent right Pigtail chest tube placement in ED s/p placement of RIJ central line. Patient began desaturating, chest X-ray ordered demonstrating 2.5cm right sided pneumothorax. Right lateral chest wall prepped and draped in sterile fashion. Injection of 1% lidocaine local sedation. Draw back while advancing needle into thoracic cavity demonstrated return of air. Wire advanced through needle. Small incision made. Tract dilated. Pigtail advanced into thoracic cavity. Air return. Attached to suction. Sutured in place. Sterile dressing applied. Repeat CXR showed tube in good position with resolution of pneumothorax.
Complication : pneumothorax . surgery team was called. Chest tube placed.   BP stable .  Patient has aspiration pneumonia and is saturating 82-85 % on high flow max setting.

## 2024-08-03 NOTE — PROCEDURE NOTE - NSPROCDETAILS_GEN_ALL_CORE
guidewire recovered/lumen(s) aspirated and flushed/sterile dressing applied/sterile technique, catheter placed/ultrasound guidance with use of sterile gel and probe cove
Seldinger technique/percutaneous/thoracostomy tube placed percutaneously

## 2024-08-03 NOTE — CONSULT NOTE ADULT - SUBJECTIVE AND OBJECTIVE BOX
Patient is a 59y old  Female who presents with a chief complaint of     HPI:  HPI : 57 yo F with pmhx of down syndrome, anemia, cerebral palsy, seizure disorder, osteoporosis, hypotension on midodrine, nonverbal at baseline who was BIBEMS from group home for  respiratory distress with complaints of fever and hypoxia that began earlier in the evening. Pt was recently admitted (June 26-July 10) for AHFH due to suspected recurrent aspiration pna, had severe sepsis on admission, was treated by infectious disease with zyvox and meropenum. En route patient was placed on NRB at 15lpm with saturations in the 70's.     Vital Signs Last 24 Hrs  T(F): 104.2 (03 Aug 2024 01:30), Max: 104.2 (03 Aug 2024 01:30)  HR: 160 (03 Aug 2024 02:20) (160 - 171)  BP: 131/68 (03 Aug 2024 02:20) (131/68 - 131/68)    Upon arrival to ED, pt was placed on HFNC and desatted to 50s, switched to BIPAP. Given high risk of recurrent aspiration, pt was suctioned and  switched over back to HFNC now satting 98%. Pt became hypotensive MAP 57, started on Phenylephrine in the ED.    Labs: WBC 29K, Lactate 3> 3.9> 5.1   CXR: RLL consolidation (fu official read)    s/p 2L IVF, Cefepime and Vanc in ED    Admitted for Sepsis POA and AHRF 2/2 Aspiration PNA    (03 Aug 2024 02:55)      PAST MEDICAL & SURGICAL HISTORY:  Down syndrome      No significant past surgical history          SOCIAL HX:   Smoking                         ETOH                            Other    FAMILY HISTORY:  No pertinent family history in first degree relatives    :  No known cardiovacular family hisotry     Review Of Systems:     All ROS are negative except per HPI       Allergies    No Known Allergies    Intolerances          PHYSICAL EXAM    ICU Vital Signs Last 24 Hrs  T(C): 39.5 (03 Aug 2024 10:15), Max: 40.1 (03 Aug 2024 01:30)  T(F): 103.1 (03 Aug 2024 10:15), Max: 104.2 (03 Aug 2024 01:30)  HR: 156 (03 Aug 2024 11:30) (140 - 171)  BP: 101/75 (03 Aug 2024 11:30) (46/52 - 135/68)  BP(mean): 87 (03 Aug 2024 11:30) (32 - 91)  ABP: --  ABP(mean): --  RR: 35 (03 Aug 2024 11:30) (27 - 49)  SpO2: 89% (03 Aug 2024 11:30) (79% - 100%)    O2 Parameters below as of 03 Aug 2024 11:30  Patient On (Oxygen Delivery Method): nasal cannula, high flow, NB        Constitutional: no acute distress, well nourished well developed  Neuro: unresponsive  HEENT: NCAT, anicteric  Neck: no visible lymphadenopathy or goiter  Pulm: (+) respiratory distress. coarse bilateral breath sounds  Cardiac: extremities appear pink and well-perfused.  regular rhythm and rate, no murmur detected  Abdomen: non-distended  Extremities: no peripheral edema  Skin: no visible rashes            LABS:                          12.5   16.40 )-----------( 227      ( 03 Aug 2024 11:35 )             40.6                                               08-03    142  |  105  |  32<H>  ----------------------------<  150<H>  5.6<H>   |  19  |  1.2    Ca    7.8<L>      03 Aug 2024 11:35  Mg     1.6     08-03    TPro  5.4<L>  /  Alb  2.6<L>  /  TBili  1.0  /  DBili  x   /  AST  82<H>  /  ALT  29  /  AlkPhos  80  08-03      PT/INR - ( 03 Aug 2024 00:32 )   PT: 12.50 sec;   INR: 1.10 ratio         PTT - ( 03 Aug 2024 00:32 )  PTT:26.8 sec                                       Urinalysis Basic - ( 03 Aug 2024 11:35 )    Color: x / Appearance: x / SG: x / pH: x  Gluc: 150 mg/dL / Ketone: x  / Bili: x / Urobili: x   Blood: x / Protein: x / Nitrite: x   Leuk Esterase: x / RBC: x / WBC x   Sq Epi: x / Non Sq Epi: x / Bacteria: x                                                  LIVER FUNCTIONS - ( 03 Aug 2024 11:35 )  Alb: 2.6 g/dL / Pro: 5.4 g/dL / ALK PHOS: 80 U/L / ALT: 29 U/L / AST: 82 U/L / GGT: x                                                  Urinalysis with Rflx Culture (collected 03 Aug 2024 08:42)                                                                                       ABG - ( 03 Aug 2024 12:09 )  pH, Arterial: 7.17  pH, Blood: x     /  pCO2: 54    /  pO2: 55    / HCO3: 20    / Base Excess: -9.2  /  SaO2: 84.3                X-Rays reviewed                                                                                     ECHO    CXR interpreted by me     MEDICATIONS  (STANDING):  gabapentin 300 milliGRAM(s) Oral two times a day  hydrocortisone sodium succinate Injectable 100 milliGRAM(s) IV Push every 8 hours  levETIRAcetam  IVPB 750 milliGRAM(s) IV Intermittent two times a day  linezolid  IVPB 600 milliGRAM(s) IV Intermittent every 12 hours  midodrine. 10 milliGRAM(s) Oral three times a day  norepinephrine Infusion 0.05 MICROgram(s)/kG/Min (2.2 mL/Hr) IV Continuous <Continuous>  pantoprazole  Injectable 40 milliGRAM(s) IV Push every 12 hours  phenylephrine    Infusion 5 MICROgram(s)/kG/Min (44 mL/Hr) IV Continuous <Continuous>  sodium bicarbonate  Infusion 0.48 mEq/kG/Hr (150 mL/Hr) IV Continuous <Continuous>  sodium bicarbonate  Injectable 50 milliEquivalent(s) IV Push once  vasopressin Infusion 0.04 Unit(s)/Min (6 mL/Hr) IV Continuous <Continuous>    MEDICATIONS  (PRN):

## 2024-08-03 NOTE — ED PROVIDER NOTE - CARE PLAN
1 Principal Discharge DX:	Sepsis due to pneumonia  Secondary Diagnosis:	Acute respiratory failure with hypoxia

## 2024-08-03 NOTE — CONSULT NOTE ADULT - SUBJECTIVE AND OBJECTIVE BOX
GENERAL SURGERY CONSULT NOTE    Patient: MARINO SOSA , 59y ([birth year])Female   MRN: [id number]  Location: [facility] ED Hold 003 A  Visit: 08-03-24 Inpatient  Date: 08-03-24 @ 07:50    HPI:  Patient is a 57 yo female with PMHx of down syndrome, anemia, cerebral palsy, seizure disorder, osteoporosis, hypotension on midodrine, nonverbal at baseline who was BIBEMS from group home for  respiratory distress with complaints of fever and hypoxia that began earlier in the evening. Admitted for Sepsis POA and AHRF 2/2 Aspiration PNA. Medicine team placed Right sided IJ central line for pressor support, however, likely caused iatrogenic PTX, surgery consulted for pigtail placement.     Vital Signs Last 24 Hrs  T(F): 104.2 (03 Aug 2024 01:30), Max: 104.2 (03 Aug 2024 01:30)  HR: 160 (03 Aug 2024 02:20) (160 - 171)  BP: 131/68 (03 Aug 2024 02:20) (131/68 - 131/68)    Upon arrival to ED, pt was placed on HFNC and desatted to 50s, switched to BIPAP. Given high risk of recurrent aspiration, pt was suctioned and  switched over back to HFNC now satting 98%. Pt became hypotensive MAP 57, started on Phenylephrine in the ED via right IJ central line. Px noted and surgery caled for pigtail.    Labs: WBC 29K, Lactate 3> 3.9> 5.1   CXR: RLL consolidation (fu official read)    s/p 2L IVF, Cefepime and Vanc in ED    Admitted for Sepsis POA and AHRF 2/2 Aspiration PNA    (03 Aug 2024 02:55)      PAST MEDICAL & SURGICAL HISTORY:  Down syndrome      No significant past surgical history          Home Medications:  calcium-vitamin D: 2 times a day 500-200 tablet (03 Aug 2024 04:42)  famotidine 40 mg oral tablet: 1 tab(s) orally once a day (03 Aug 2024 04:31)  gabapentin 250 mg/5 mL oral solution: 6 milliliter(s) orally 2 times a day (03 Aug 2024 04:31)  ipratropium-albuterol 0.5 mg-2.5 mg/3 mL inhalation solution: 3 milliliter(s) by nebulizer (03 Aug 2024 04:38)  Keppra 100 mg/mL intravenous solution: 1,000 milligram(s) intravenously 2 times a day 7.5ml every 12 hours (03 Aug 2024 04:48)  melatonin 3 mg oral tablet: 1 tab(s) orally once a day (03 Aug 2024 04:37)  midodrine 10 mg oral tablet: 1 tab(s) orally 3 times a day (03 Aug 2024 04:33)  Protonix 40 mg oral delayed release tablet: 1 tab(s) orally once a day (03 Aug 2024 04:35)  raloxifene 60 mg oral tablet: 1 tab(s) orally once a day (03 Aug 2024 04:35)  Senexon-S 50 mg-8.6 mg oral tablet: 2 tab(s) orally once a day (at bedtime) (03 Aug 2024 04:39)        VITALS:  T(F): 102.5 (08-03-24 @ 05:09), Max: 104.2 (08-03-24 @ 01:30)  HR: 144 (08-03-24 @ 07:15) (144 - 171)  BP: 110/70 (08-03-24 @ 07:15) (46/52 - 135/68)  RR: 39 (08-03-24 @ 05:09) (31 - 49)  SpO2: 89% (08-03-24 @ 05:09) (79% - 100%)    PHYSICAL EXAM:  General: Nonverbal, BiPAP on saturating low 80s  Cardiac: RRR  Respiratory: Respiratory distress using accessory muscles, decreased breath sounds on right side vs left  Skin: Warm/dry, normal color, no jaundice    MEDICATIONS  (STANDING):  enoxaparin Injectable 40 milliGRAM(s) SubCutaneous every 24 hours  gabapentin 300 milliGRAM(s) Oral two times a day  lactated ringers. 1000 milliLiter(s) (100 mL/Hr) IV Continuous <Continuous>  levETIRAcetam  IVPB 750 milliGRAM(s) IV Intermittent two times a day  midodrine. 10 milliGRAM(s) Oral three times a day  pantoprazole    Tablet 40 milliGRAM(s) Oral before breakfast  phenylephrine    Infusion 0.1 MICROgram(s)/kG/Min (1.76 mL/Hr) IV Continuous <Continuous>  vancomycin  IVPB 1000 milliGRAM(s) IV Intermittent every 12 hours    MEDICATIONS  (PRN):      LAB/STUDIES:                        12.9   29.81 )-----------( 417      ( 03 Aug 2024 00:32 )             42.6     08-03    142  |  102  |  29<H>  ----------------------------<  243<H>  4.9   |  25  |  0.7    Ca    8.9      03 Aug 2024 00:32    TPro  7.4  /  Alb  3.4<L>  /  TBili  0.3  /  DBili  x   /  AST  24  /  ALT  23  /  AlkPhos  136<H>  08-03    PT/INR - ( 03 Aug 2024 00:32 )   PT: 12.50 sec;   INR: 1.10 ratio         PTT - ( 03 Aug 2024 00:32 )  PTT:26.8 sec  LIVER FUNCTIONS - ( 03 Aug 2024 00:32 )  Alb: 3.4 g/dL / Pro: 7.4 g/dL / ALK PHOS: 136 U/L / ALT: 23 U/L / AST: 24 U/L / GGT: x           Urinalysis Basic - ( 03 Aug 2024 00:32 )    Color: x / Appearance: x / SG: x / pH: x  Gluc: 243 mg/dL / Ketone: x  / Bili: x / Urobili: x   Blood: x / Protein: x / Nitrite: x   Leuk Esterase: x / RBC: x / WBC x   Sq Epi: x / Non Sq Epi: x / Bacteria: x      Assessment:  Patient is a 57 yo female with PMHx of down syndrome, anemia, cerebral palsy, seizure disorder, osteoporosis, hypotension on midodrine, nonverbal at baseline who was BIBEMS from [facility] home for  respiratory distress with complaints of fever and hypoxia that began earlier in the evening. Admitted for Sepsis POA and AHRF 2/2 Aspiration PNA. Medicine team placed Right sided IJ central line for pressor support, however, likely caused iatrogenic PTX, surgery consulted for pigtail placement.     Plan:  - Case and plan discussed with surgical attending Dr. Sewell  - 14Fr right sided pigtail placed in ED due to 2.5cm right sided apical ptx  - Lung re-expanded after placement of pigtail with a 1 column Airleak  - Patient currently on medicine service for sepsis workup/treatment of aspiration PNA and will continue care.  - Thoracic surgery team to manage pigtail  - Daily AM CXR  - Monitor saturations (currently saturating low 80s in ED post pigtail, however, presented to the ED in the 70s likely 2/2 to aspiration PNA)    [phone] Green Team

## 2024-08-03 NOTE — CONSULT NOTE ADULT - SUBJECTIVE AND OBJECTIVE BOX
MARINO SOSA  59y, Female  Allergy: No Known Allergies      All historical available data reviewed.    HPI:  HPI : 57 yo F with pmhx of down syndrome, anemia, cerebral palsy, seizure disorder, osteoporosis, hypotension on midodrine, nonverbal at baseline who was BIBEMS from [facility] home for  respiratory distress with complaints of fever and hypoxia that began earlier in the evening. Pt was recently admitted (June 26-July 10) for AHFH due to suspected recurrent aspiration pna, had severe sepsis on admission, was treated by infectious disease with zyvox and meropenum. En route patient was placed on NRB at 15lpm with saturations in the 70's.     Vital Signs Last 24 Hrs  T(F): 104.2 (03 Aug 2024 01:30), Max: 104.2 (03 Aug 2024 01:30)  HR: 160 (03 Aug 2024 02:20) (160 - 171)  BP: 131/68 (03 Aug 2024 02:20) (131/68 - 131/68)    Upon arrival to ED, pt was placed on HFNC and desatted to 50s, switched to BIPAP. Given high risk of recurrent aspiration, pt was suctioned and  switched over back to HFNC now satting 98%. Pt became hypotensive MAP 57, started on Phenylephrine in the ED.    Labs: WBC 29K, Lactate 3> 3.9> 5.1   CXR: RLL consolidation (fu official read)    s/p 2L IVF, Cefepime and Vanc in ED    Admitted for Sepsis POA and AHRF 2/2 Aspiration PNA    (03 Aug 2024 02:55)    FAMILY HISTORY:  No pertinent family history in first degree relatives      PAST MEDICAL & SURGICAL HISTORY:  Down syndrome      No significant past surgical history            VITALS:  T(F): 102.5, Max: 104.2 (08-03-24 @ 01:30)  HR: 144  BP: 110/70  RR: 39Vital Signs Last 24 Hrs  T(C): 39.2 (03 Aug 2024 05:09), Max: 40.1 (03 Aug 2024 01:30)  T(F): 102.5 (03 Aug 2024 05:09), Max: 104.2 (03 Aug 2024 01:30)  HR: 144 (03 Aug 2024 07:15) (144 - 171)  BP: 110/70 (03 Aug 2024 07:15) (46/52 - 135/68)  BP(mean): 81 (03 Aug 2024 07:15) (32 - 81)  RR: 39 (03 Aug 2024 05:09) (31 - 49)  SpO2: 89% (03 Aug 2024 05:09) (79% - 100%)    Parameters below as of 03 Aug 2024 05:09  Patient On (Oxygen Delivery Method): nasal cannula, high flow, as well as NRB  O2 Flow (L/min): 60  O2 Concentration (%): 100    TESTS & MEASUREMENTS:                        9.7    10.81 )-----------( 284      ( 03 Aug 2024 09:03 )             34.0     08-03    140  |  105  |  33<H>  ----------------------------<  134<H>  5.0   |  19  |  1.2    Ca    8.2<L>      03 Aug 2024 09:03  Mg     1.6     08-03    TPro  4.9<L>  /  Alb  2.2<L>  /  TBili  0.8  /  DBili  x   /  AST  37  /  ALT  18  /  AlkPhos  70  08-03    LIVER FUNCTIONS - ( 03 Aug 2024 09:03 )  Alb: 2.2 g/dL / Pro: 4.9 g/dL / ALK PHOS: 70 U/L / ALT: 18 U/L / AST: 37 U/L / GGT: x             Urinalysis with Rflx Culture (collected 08-03-24 @ 08:42)      Urinalysis Basic - ( 03 Aug 2024 09:03 )    Color: x / Appearance: x / SG: x / pH: x  Gluc: 134 mg/dL / Ketone: x  / Bili: x / Urobili: x   Blood: x / Protein: x / Nitrite: x   Leuk Esterase: x / RBC: x / WBC x   Sq Epi: x / Non Sq Epi: x / Bacteria: x          RADIOLOGY & ADDITIONAL TESTS:  Personal review of radiological diagnostics performed  Echo and EKG results noted when applicable.     MEDICATIONS:  acetaminophen  Suppository .. 650 milliGRAM(s) Rectal once  gabapentin 300 milliGRAM(s) Oral two times a day  hydrocortisone sodium succinate Injectable 100 milliGRAM(s) IV Push every 8 hours  levETIRAcetam  IVPB 750 milliGRAM(s) IV Intermittent two times a day  midodrine. 10 milliGRAM(s) Oral three times a day  norepinephrine Infusion 0.05 MICROgram(s)/kG/Min IV Continuous <Continuous>  pantoprazole  Injectable 40 milliGRAM(s) IV Push every 12 hours  phenylephrine    Infusion 0.1 MICROgram(s)/kG/Min IV Continuous <Continuous>  sodium bicarbonate  Infusion 0.32 mEq/kG/Hr IV Continuous <Continuous>  sodium bicarbonate  Injectable 100 milliEquivalent(s) IV Push once  vancomycin  IVPB 1000 milliGRAM(s) IV Intermittent every 12 hours  vasopressin Infusion 0.04 Unit(s)/Min IV Continuous <Continuous>      ANTIBIOTICS:  vancomycin  IVPB 1000 milliGRAM(s) IV Intermittent every 12 hours

## 2024-08-03 NOTE — PATIENT PROFILE ADULT - FALL HARM RISK - HARM RISK INTERVENTIONS

## 2024-08-03 NOTE — ED PROVIDER NOTE - OBJECTIVE STATEMENT
Patient is a 58 year old female with PMH of down syndrome, anemia, cerebral palsy, seizure disorder, osteoporosis, hypotension on midodrine, nonverbal at baseline presenting for difficulty breathing. Per EMS, patient is coming from group home for respiratory distress with complaints of fever and hypoxia that began earlier in the evening. En route patient was placed on NRB at 15lpm with saturations in the 70's. No further history obtained.

## 2024-08-03 NOTE — ED PROVIDER NOTE - PHYSICAL EXAMINATION
VITAL SIGNS: I have reviewed nursing notes and confirm.  CONSTITUTIONAL: Ill-appearing in moderate respiratory distress  SKIN: warm, pale, diaphoretic   HEAD: NCAT  EYES: No conjunctival injection, scleral anicteric  ENT: Moist mucous membranes, normal pharynx with no erythema or exudates  NECK: Supple; full ROM. Nontender. No cervical LAD  CARD: tachycardic   RESP: diffuse crackles/rales and tachypneic   ABD: Soft, non-distended, non-tender  EXT: UE/LE contracted  NEURO: no focal deficits noted. non-verbal at baseline

## 2024-08-03 NOTE — H&P ADULT - CONVERSATION DETAILS
Calls made to Dr. Villalobos, Ms. Peña from CAB and Dr. Capone from palliative. Comfort care has been discussed in the past; confirmed pt is dnr/ dni. At time of conversation pt was maxed on two pressors without bp read; pending third pressor with tanner aspiration noted. After discussion, will need to cont medical management until approval by regulatory agency.

## 2024-08-03 NOTE — H&P ADULT - NSHPLABSRESULTS_GEN_ALL_CORE
.  LABS:                         12.9   29.81 )-----------( 417      ( 03 Aug 2024 00:32 )             42.6     08-03    142  |  102  |  29<H>  ----------------------------<  243<H>  4.9   |  25  |  0.7    Ca    8.9      03 Aug 2024 00:32    TPro  7.4  /  Alb  3.4<L>  /  TBili  0.3  /  DBili  x   /  AST  24  /  ALT  23  /  AlkPhos  136<H>  08-03    PT/INR - ( 03 Aug 2024 00:32 )   PT: 12.50 sec;   INR: 1.10 ratio         PTT - ( 03 Aug 2024 00:32 )  PTT:26.8 sec  Urinalysis Basic - ( 03 Aug 2024 00:32 )    Color: x / Appearance: x / SG: x / pH: x  Gluc: 243 mg/dL / Ketone: x  / Bili: x / Urobili: x   Blood: x / Protein: x / Nitrite: x   Leuk Esterase: x / RBC: x / WBC x   Sq Epi: x / Non Sq Epi: x / Bacteria: x        Lactate, Blood: 3.0 mmol/L (08-03 @ 00:32)      RADIOLOGY, EKG & ADDITIONAL TESTS: Reviewed.

## 2024-08-03 NOTE — H&P ADULT - ATTENDING COMMENTS
#Hypotension/ shock  presumed septic, present on admission  with acute hypoxic resp failure  presumed aspiration pna, +/- uti  cxr bl opacities  course c/b PTX during central line  chest tube in place  zyvox, jessica  bp improved after adding levophed to vaso, phenyl  maintain map >65  on hfnc to keep spo2 >92  +black emesis with tanner aspiration  s/p protonix 80 iv x1; cont protonix 40 iv bid  ongoing goc as above; awaiting approval for cmo from regulatory agency  dnr/ dni  f/u palliative  critical care eval    #Metabolic and resp acidosis  due to lactate; hypercapnia  improved, s/p bicarb pushes  trend lactate  s/p ivf  bicarb gtt per icu      #Progress Note Handoff  Pending (specify):  Family discussion:  Disposition:

## 2024-08-04 NOTE — PROGRESS NOTE ADULT - SUBJECTIVE AND OBJECTIVE BOX
Patient is a 59y old  Female who presents with a chief complaint of       Over Night Events:    Febrile 103.5   On 3 pressors   On BIPAP with 100% Fio2         ROS:  See HPI    PHYSICAL EXAM    ICU Vital Signs Last 24 Hrs  T(C): 38.7 (04 Aug 2024 09:00), Max: 39.7 (03 Aug 2024 12:30)  T(F): 101.6 (04 Aug 2024 09:00), Max: 103.5 (03 Aug 2024 12:30)  HR: 114 (04 Aug 2024 09:00) (101 - 156)  BP: 128/68 (04 Aug 2024 09:00) (65/38 - 156/67)  BP(mean): 92 (04 Aug 2024 09:00) (47 - 120)  ABP: 130/72 (04 Aug 2024 09:00) (42/28 - 135/74)  ABP(mean): 98 (04 Aug 2024 09:00) (33 - 118)  RR: 30 (04 Aug 2024 09:00) (24 - 47)  SpO2: 95% (04 Aug 2024 09:00) (73% - 100%)    O2 Parameters below as of 04 Aug 2024 09:00  Patient On (Oxygen Delivery Method): BiPAP/CPAP, 16/8    O2 Concentration (%): 100        General: No MS   HEENT: DANNY             Lymphatic system: No cervical LN   Lungs: Bilateral BS, coarse   Cardiovascular: Regular   Gastrointestinal: Soft, Positive BS  Extremities: No clubbing. No movement   Skin: Warm, intact  Neurological: MS at baseline      08-03-24 @ 07:01  -  08-04-24 @ 07:00  --------------------------------------------------------  IN:    Enteral Tube Flush: 105 mL    Esmolol: 303.9 mL    IV PiggyBack: 1300 mL    Lactated Ringers: 550 mL    Norepinephrine: 796.5 mL    Phenylephrine: 531 mL    Phenylephrine: 1501.1 mL    PRBCs (Packed Red Blood Cells): 313 mL    Sodium Bicarbonate: 2700 mL    Vasopressin: 120 mL  Total IN: 8220.5 mL    OUT:    Chest Tube (mL): 124 mL    Emesis (mL): 100 mL    Incontinent per Collection Bag (mL): 4850 mL  Total OUT: 5074 mL    Total NET: 3146.5 mL      08-04-24 @ 07:01  -  08-04-24 @ 09:17  --------------------------------------------------------  IN:    IV PiggyBack: 50 mL    Lactated Ringers: 100 mL    Norepinephrine: 8 mL    Phenylephrine: 176.6 mL    Sodium Bicarbonate: 300 mL    Vasopressin: 12 mL  Total IN: 646.6 mL    OUT:    Incontinent per Collection Bag (mL): 600 mL  Total OUT: 600 mL    Total NET: 46.6 mL          LABS:                            10.1   22.14 )-----------( 166      ( 04 Aug 2024 04:35 )             31.6                                               08-04    143  |  98  |  19  ----------------------------<  198<H>  3.7   |  24  |  1.0    Ca    7.7<L>      04 Aug 2024 04:35  Mg     1.6     08-04    TPro  4.9<L>  /  Alb  2.4<L>  /  TBili  0.6  /  DBili  x   /  AST  138<H>  /  ALT  76<H>  /  AlkPhos  49  08-04      PT/INR - ( 03 Aug 2024 00:32 )   PT: 12.50 sec;   INR: 1.10 ratio         PTT - ( 03 Aug 2024 00:32 )  PTT:26.8 sec                                       Urinalysis Basic - ( 04 Aug 2024 04:35 )    Color: x / Appearance: x / SG: x / pH: x  Gluc: 198 mg/dL / Ketone: x  / Bili: x / Urobili: x   Blood: x / Protein: x / Nitrite: x   Leuk Esterase: x / RBC: x / WBC x   Sq Epi: x / Non Sq Epi: x / Bacteria: x                                                  LIVER FUNCTIONS - ( 04 Aug 2024 04:35 )  Alb: 2.4 g/dL / Pro: 4.9 g/dL / ALK PHOS: 49 U/L / ALT: 76 U/L / AST: 138 U/L / GGT: x                                                  Urinalysis with Rflx Culture (collected 03 Aug 2024 08:42)    Culture - Blood (collected 03 Aug 2024 00:32)  Source: .Blood Blood-Peripheral  Preliminary Report (04 Aug 2024 09:02):    No growth at 24 hours    Culture - Blood (collected 03 Aug 2024 00:32)  Source: .Blood Blood-Peripheral  Preliminary Report (04 Aug 2024 09:02):    No growth at 24 hours                                                                                       ABG - ( 03 Aug 2024 12:09 )  pH, Arterial: 7.17  pH, Blood: x     /  pCO2: 54    /  pO2: 55    / HCO3: 20    / Base Excess: -9.2  /  SaO2: 84.3                MEDICATIONS  (STANDING):  chlorhexidine 2% Cloths 1 Application(s) Topical <User Schedule>  gabapentin 300 milliGRAM(s) Oral two times a day  hydrocortisone sodium succinate Injectable 100 milliGRAM(s) IV Push every 8 hours  lactated ringers. 1000 milliLiter(s) (50 mL/Hr) IV Continuous <Continuous>  levETIRAcetam  IVPB 750 milliGRAM(s) IV Intermittent two times a day  linezolid  IVPB 600 milliGRAM(s) IV Intermittent every 12 hours  meropenem  IVPB 1000 milliGRAM(s) IV Intermittent every 8 hours  midodrine. 10 milliGRAM(s) Oral three times a day  norepinephrine Infusion 0.05 MICROgram(s)/kG/Min (2.2 mL/Hr) IV Continuous <Continuous>  pantoprazole  Injectable 40 milliGRAM(s) IV Push every 12 hours  phenylephrine    Infusion 5 MICROgram(s)/kG/Min (44 mL/Hr) IV Continuous <Continuous>  sodium bicarbonate  Infusion 0.48 mEq/kG/Hr (150 mL/Hr) IV Continuous <Continuous>  vasopressin Infusion 0.04 Unit(s)/Min (6 mL/Hr) IV Continuous <Continuous>    MEDICATIONS  (PRN):      Xrays: increased right infiltrates                                                                                    ECHO

## 2024-08-04 NOTE — PROGRESS NOTE ADULT - SUBJECTIVE AND OBJECTIVE BOX
GENERAL SURGERY PROGRESS NOTE     Patient: MARINO SOSA , 59y ([birth year])Female   MRN: [id number]  Location: [address]  Visit: 08-03-24 Inpatient  Date: 08-04-24 @ 00:35        Admitted :08-03-24 (1d)  LOS: 1d    Procedure/Dx/Injuries: Sepsis due to pneumonia        Events of past 24 hours:   Patient seen and examined at bedside.   SpO2 decreased to ~70%. HFNC was switched to BiPAP. Pt's SBP drop to 60s after the start of BiPAP (map 61). On 3 pressors, 10 Ming, 0.04 Vaso, 0.9 Levo.   CT to SXN, 100 serous output. Unable to access AL. Febrile.     >>> <<<>>> <<<>>> <<<>>> <<<>>> <<<>>> <<<>>> <<<>>> <<<>>> <<<>>> <<<    Vitals:   T(F): 103 (08-04-24 @ 00:00), Max: 104.2 (08-03-24 @ 01:30)  HR: 120 (08-04-24 @ 00:05)  BP: 130/56 (08-04-24 @ 00:05)  RR: 39 (08-04-24 @ 00:05)  SpO2: 89% (08-04-24 @ 00:05)      PHYSICAL EXAM:  General Appearance: in acute distress   Heart: s1, s2  Lungs: Increased work of breathing. CT to SXN  Abdomen: Soft, nontender, nondistended. PEG tube in placed   Ext: BL LE contraction   >>> <<<>>> <<<>>> <<<>>> <<<>>> <<<>>> <<<>>> <<<>>> <<<>>> <<<>>> <<<   Is & Os:   Diet, NPO:   Except Medications    Fluids: lactated ringers.: Solution, 1000 milliLiter(s) infuse at 50 mL/Hr          Bowel Movement: :   Flatus: :   >>> <<<>>> <<<>>> <<<>>> <<<>>> <<<>>> <<<>>> <<<>>> <<<>>> <<<>>> <<<    MEDICATIONS  (STANDING):  esmolol  Infusion 50 MICROgram(s)/kG/Min (14.1 mL/Hr) IV Continuous <Continuous>  gabapentin 300 milliGRAM(s) Oral two times a day  hydrocortisone sodium succinate Injectable 100 milliGRAM(s) IV Push every 8 hours  lactated ringers. 1000 milliLiter(s) (50 mL/Hr) IV Continuous <Continuous>  levETIRAcetam  IVPB 750 milliGRAM(s) IV Intermittent two times a day  linezolid  IVPB 600 milliGRAM(s) IV Intermittent every 12 hours  meropenem  IVPB 1000 milliGRAM(s) IV Intermittent every 8 hours  midodrine. 10 milliGRAM(s) Oral three times a day  norepinephrine Infusion 0.05 MICROgram(s)/kG/Min (2.2 mL/Hr) IV Continuous <Continuous>  pantoprazole  Injectable 40 milliGRAM(s) IV Push every 12 hours  phenylephrine    Infusion 5 MICROgram(s)/kG/Min (44 mL/Hr) IV Continuous <Continuous>  sodium bicarbonate  Infusion 0.48 mEq/kG/Hr (150 mL/Hr) IV Continuous <Continuous>  vasopressin Infusion 0.04 Unit(s)/Min (6 mL/Hr) IV Continuous <Continuous>    MEDICATIONS  (PRN):      DVT PROPHYLAXIS:   GI PROPHYLAXIS: pantoprazole  Injectable 40 milliGRAM(s) IV Push every 12 hours    ANTICOAGULATION:   ANTIBIOTICS:  linezolid  IVPB 600 milliGRAM(s)  meropenem  IVPB 1000 milliGRAM(s)      >>> <<<>>> <<<>>> <<<>>> <<<>>> <<<>>> <<<>>> <<<>>> <<<>>> <<<>>> <<<        LAB/STUDIES:  Labs:  CAPILLARY BLOOD GLUCOSE      POCT Blood Glucose.: 269 mg/dL (04 Aug 2024 00:17)  POCT Blood Glucose.: 199 mg/dL (03 Aug 2024 17:21)                          12.5   16.40 )-----------( 227      ( 03 Aug 2024 11:35 )             40.6       Auto Neutrophil %: 53.4 % (08-03-24 @ 09:03)  Band Neutrophils %: 10.3 % (08-03-24 @ 09:03)    08-03    142  |  105  |  32<H>  ----------------------------<  150<H>  5.6<H>   |  19  |  1.2      Calcium: 7.8 mg/dL (08-03-24 @ 11:35)      LFTs:             5.4  | 1.0  | 82       ------------------[80      ( 03 Aug 2024 11:35 )  2.6  | x    | 29          Lipase:x      Amylase:x         Blood Gas Arterial, Lactate: 8.8 mmol/L (08-03-24 @ 12:09)  Lactate, Blood: 8.4 mmol/L (08-03-24 @ 11:35)  Blood Gas Arterial, Lactate: 8.4 mmol/L (08-03-24 @ 11:23)  Lactate, Blood: 7.9 mmol/L (08-03-24 @ 09:03)  Blood Gas Venous - Lactate: 7.4 mmol/L (08-03-24 @ 09:03)  Blood Gas Arterial, Lactate: 7.2 mmol/L (08-03-24 @ 08:53)  Blood Gas Venous - Lactate: 5.1 mmol/L (08-03-24 @ 02:41)  Blood Gas Venous - Lactate: 3.9 mmol/L (08-03-24 @ 00:48)  Lactate, Blood: 3.0 mmol/L (08-03-24 @ 00:32)    ABG - ( 03 Aug 2024 12:09 )  pH: 7.17  /  pCO2: 54    /  pO2: 55    / HCO3: 20    / Base Excess: -9.2  /  SaO2: 84.3            ABG - ( 03 Aug 2024 11:23 )  pH: 7.20  /  pCO2: 52    /  pO2: 66    / HCO3: 20    / Base Excess: -8.0  /  SaO2: 91.6            ABG - ( 03 Aug 2024 08:53 )  pH: 7.10  /  pCO2: 63    /  pO2: 67    / HCO3: 20    / Base Excess: -10.2 /  SaO2: 88.4              Coags:     12.50  ----< 1.10    ( 03 Aug 2024 00:32 )     26.8                Urinalysis Basic - ( 03 Aug 2024 11:35 )    Color: x / Appearance: x / SG: x / pH: x  Gluc: 150 mg/dL / Ketone: x  / Bili: x / Urobili: x   Blood: x / Protein: x / Nitrite: x   Leuk Esterase: x / RBC: x / WBC x   Sq Epi: x / Non Sq Epi: x / Bacteria: x        Urinalysis with Rflx Culture (collected 03 Aug 2024 08:42)            >>> <<<>>> <<<>>> <<<>>> <<<>>> <<<>>> <<<>>> <<<>>> <<<>>> <<<>>> <<<  ASSESSMENT:  Patient is a 59 yo female with PMHx of down syndrome, anemia, cerebral palsy, seizure disorder, osteoporosis, hypotension on midodrine, nonverbal at baseline who was BIBEMS from group home for  respiratory distress with complaints of fever and hypoxia that began earlier in the evening. Admitted for Sepsis POA and AHRF 2/2 Aspiration PNA. Medicine team placed Right sided IJ central line for pressor support, however, likely caused iatrogenic PTX s/p pigtail placement    Plan:  - CT to SXN  - Daily AM CXR   - Monitor saturations  - Rest of care per primary team     [facility]EN TEAM SPECTRA [id number] THORACIC SURGERY PROGRESS NOTE     Patient: MARINO SOSA , 59y ([birth year])Female   MRN: [id number]  Location: [address]  Visit: 08-03-24 Inpatient  Date: 08-04-24 @ 00:35        Admitted :08-03-24 (1d)  LOS: 1d    Procedure/Dx/Injuries: Sepsis due to pneumonia        Events of past 24 hours:   Patient seen and examined at bedside.   SpO2 decreased to ~70%. HFNC was switched to BiPAP. Pt's SBP drop to 60s after the start of BiPAP (map 61). On 3 pressors, 10 Ming, 0.04 Vaso, 0.9 Levo.   CT to SXN, 100 serous output. Unable to access AL. Febrile.     >>> <<<>>> <<<>>> <<<>>> <<<>>> <<<>>> <<<>>> <<<>>> <<<>>> <<<>>> <<<    Vitals:   T(F): 103 (08-04-24 @ 00:00), Max: 104.2 (08-03-24 @ 01:30)  HR: 120 (08-04-24 @ 00:05)  BP: 130/56 (08-04-24 @ 00:05)  RR: 39 (08-04-24 @ 00:05)  SpO2: 89% (08-04-24 @ 00:05)      PHYSICAL EXAM:  General Appearance: in acute distress   Heart: s1, s2  Lungs: Increased work of breathing. CT to SXN  Abdomen: Soft, nontender, nondistended. PEG tube in placed   Ext: BL LE contraction   >>> <<<>>> <<<>>> <<<>>> <<<>>> <<<>>> <<<>>> <<<>>> <<<>>> <<<>>> <<<   Is & Os:   Diet, NPO:   Except Medications    Fluids: lactated ringers.: Solution, 1000 milliLiter(s) infuse at 50 mL/Hr          Bowel Movement: :   Flatus: :   >>> <<<>>> <<<>>> <<<>>> <<<>>> <<<>>> <<<>>> <<<>>> <<<>>> <<<>>> <<<    MEDICATIONS  (STANDING):  esmolol  Infusion 50 MICROgram(s)/kG/Min (14.1 mL/Hr) IV Continuous <Continuous>  gabapentin 300 milliGRAM(s) Oral two times a day  hydrocortisone sodium succinate Injectable 100 milliGRAM(s) IV Push every 8 hours  lactated ringers. 1000 milliLiter(s) (50 mL/Hr) IV Continuous <Continuous>  levETIRAcetam  IVPB 750 milliGRAM(s) IV Intermittent two times a day  linezolid  IVPB 600 milliGRAM(s) IV Intermittent every 12 hours  meropenem  IVPB 1000 milliGRAM(s) IV Intermittent every 8 hours  midodrine. 10 milliGRAM(s) Oral three times a day  norepinephrine Infusion 0.05 MICROgram(s)/kG/Min (2.2 mL/Hr) IV Continuous <Continuous>  pantoprazole  Injectable 40 milliGRAM(s) IV Push every 12 hours  phenylephrine    Infusion 5 MICROgram(s)/kG/Min (44 mL/Hr) IV Continuous <Continuous>  sodium bicarbonate  Infusion 0.48 mEq/kG/Hr (150 mL/Hr) IV Continuous <Continuous>  vasopressin Infusion 0.04 Unit(s)/Min (6 mL/Hr) IV Continuous <Continuous>    MEDICATIONS  (PRN):      DVT PROPHYLAXIS:   GI PROPHYLAXIS: pantoprazole  Injectable 40 milliGRAM(s) IV Push every 12 hours    ANTICOAGULATION:   ANTIBIOTICS:  linezolid  IVPB 600 milliGRAM(s)  meropenem  IVPB 1000 milliGRAM(s)      >>> <<<>>> <<<>>> <<<>>> <<<>>> <<<>>> <<<>>> <<<>>> <<<>>> <<<>>> <<<        LAB/STUDIES:  Labs:  CAPILLARY BLOOD GLUCOSE      POCT Blood Glucose.: 269 mg/dL (04 Aug 2024 00:17)  POCT Blood Glucose.: 199 mg/dL (03 Aug 2024 17:21)                          12.5   16.40 )-----------( 227      ( 03 Aug 2024 11:35 )             40.6       Auto Neutrophil %: 53.4 % (08-03-24 @ 09:03)  Band Neutrophils %: 10.3 % (08-03-24 @ 09:03)    08-03    142  |  105  |  32<H>  ----------------------------<  150<H>  5.6<H>   |  19  |  1.2      Calcium: 7.8 mg/dL (08-03-24 @ 11:35)      LFTs:             5.4  | 1.0  | 82       ------------------[80      ( 03 Aug 2024 11:35 )  2.6  | x    | 29          Lipase:x      Amylase:x         Blood Gas Arterial, Lactate: 8.8 mmol/L (08-03-24 @ 12:09)  Lactate, Blood: 8.4 mmol/L (08-03-24 @ 11:35)  Blood Gas Arterial, Lactate: 8.4 mmol/L (08-03-24 @ 11:23)  Lactate, Blood: 7.9 mmol/L (08-03-24 @ 09:03)  Blood Gas Venous - Lactate: 7.4 mmol/L (08-03-24 @ 09:03)  Blood Gas Arterial, Lactate: 7.2 mmol/L (08-03-24 @ 08:53)  Blood Gas Venous - Lactate: 5.1 mmol/L (08-03-24 @ 02:41)  Blood Gas Venous - Lactate: 3.9 mmol/L (08-03-24 @ 00:48)  Lactate, Blood: 3.0 mmol/L (08-03-24 @ 00:32)    ABG - ( 03 Aug 2024 12:09 )  pH: 7.17  /  pCO2: 54    /  pO2: 55    / HCO3: 20    / Base Excess: -9.2  /  SaO2: 84.3            ABG - ( 03 Aug 2024 11:23 )  pH: 7.20  /  pCO2: 52    /  pO2: 66    / HCO3: 20    / Base Excess: -8.0  /  SaO2: 91.6            ABG - ( 03 Aug 2024 08:53 )  pH: 7.10  /  pCO2: 63    /  pO2: 67    / HCO3: 20    / Base Excess: -10.2 /  SaO2: 88.4              Coags:     12.50  ----< 1.10    ( 03 Aug 2024 00:32 )     26.8                Urinalysis Basic - ( 03 Aug 2024 11:35 )    Color: x / Appearance: x / SG: x / pH: x  Gluc: 150 mg/dL / Ketone: x  / Bili: x / Urobili: x   Blood: x / Protein: x / Nitrite: x   Leuk Esterase: x / RBC: x / WBC x   Sq Epi: x / Non Sq Epi: x / Bacteria: x        Urinalysis with Rflx Culture (collected 03 Aug 2024 08:42)            >>> <<<>>> <<<>>> <<<>>> <<<>>> <<<>>> <<<>>> <<<>>> <<<>>> <<<>>> <<<  ASSESSMENT:  Patient is a 59 yo female with PMHx of down syndrome, anemia, cerebral palsy, seizure disorder, osteoporosis, hypotension on midodrine, nonverbal at baseline who was BIBEMS from group home for  respiratory distress with complaints of fever and hypoxia that began earlier in the evening. Admitted for Sepsis POA and AHRF 2/2 Aspiration PNA. Medicine team placed Right sided IJ central line for pressor support, however, likely caused iatrogenic PTX s/p pigtail placement    Plan:  - CT to SXN  - Daily AM CXR   - Monitor saturations  - Rest of care per primary team     GREEN TEAM SPECTRA [id number]

## 2024-08-04 NOTE — PROGRESS NOTE ADULT - ASSESSMENT
59 yo F with pmhx of down syndrome, anemia, cerebral palsy, seizure disorder, osteoporosis, dysphagia s/p PEG hypotension on midodrine, nonverbal at baseline who was BIBEMS from group home for  respiratory distress with complaints of fever and hypoxia that began earlier in the evening. Pt was recently admitted (June 26-July 10) for AHFH due to suspected recurrent aspiration pna, had severe sepsis on admission.Admitted for Sepsis shock and AHRF s/p intubation on multiple pressors. GI consulted for acute drop in hb .     #Acute drop in hb -  r/o bleed- no overt GI bleed  #AHRF with septic shock on 3 pressors  - Baseline hemoglobin - 12>9.7>12 s/p 1u prbc so far  - Hemoglobin on admission - 12>10  - PEG lavage with dark bile, no blood; CHARITO: empty vault    #Rec  - IV PPI BID 40mg  - will recommend CTAP to r/o any other source of bleed as low suspicion for GI bleed  - GOC discussion, patient is critically ill   - Maintain active Type and screen  - Trend H&H BID  - Please start IV fluids (SBP >90)   - Please correct electrolytes (Target Na 135-145, Mg 1.7-2.2, K 3.5-5)  - Please correct INR to <1.5  - Please target Hb  >7   - Please avoid any NSAIDs  - If any unstable bleed, please call GI stat       Primary team unable to contact legal guardian/decision maker over weekend

## 2024-08-04 NOTE — PROGRESS NOTE ADULT - ASSESSMENT
Impression    # Septic Shock  # Sinus tachycardia  # Acute hypoxemic respiratory failure  # Acute aspiration pneumonia  # Iatrogenic R PTX    Plan:      CNS: No sedation; AEDs; On BIPAP. MS is poor to begin with.     HEENT: Oral care.     CARDIOVASCULAR: Wean off Ming; continue Levophed and Vasopressin.     PULMONARY: Chest tube to suction, no air leak. BIPAP with 100%Fio2. ABG stat. DNI.     GASTROINTESTINAL: NPO for now while on BIPAP.     GENITOURINARY/RENAL: Fluid balance equal. Bicarb is normal. DC IV fluids.     INFECTIOUS: Pan cultures: ID eval. Meropenem and Linezolid.     HEMATOLOGIC: DVT ppx: Lovenox SQ    ENDOCRINE: Follow up FS.  Insulin protocol as needed.  BG goal 140-180    MSK/DERM    CODE STATUS: DNR/DNI; pall care eval; discussion for CMO  DISPO: MICU care; poor prognosis   LINES: R-IJ TLC, R chest tube to suction; A line; no cervantes

## 2024-08-04 NOTE — PROGRESS NOTE ADULT - SUBJECTIVE AND OBJECTIVE BOX
IANMARINO  59y, Female    All available historical data reviewed    OVERNIGHT EVENTS:  fevers  Bipap  non responsive  on pressors    ROS:  unable to obtain history secondary to patient's mental status    VITALS:  T(F): 101.6, Max: 103.2 (08-03-24 @ 23:00)  HR: 105  BP: 113/73  RR: 24Vital Signs Last 24 Hrs  T(C): 38.7 (04 Aug 2024 18:00), Max: 39.6 (03 Aug 2024 23:00)  T(F): 101.6 (04 Aug 2024 18:00), Max: 103.2 (03 Aug 2024 23:00)  HR: 105 (04 Aug 2024 18:00) (95 - 153)  BP: 113/73 (04 Aug 2024 18:00) (65/38 - 156/67)  BP(mean): 85 (04 Aug 2024 18:00) (47 - 120)  RR: 24 (04 Aug 2024 18:00) (22 - 47)  SpO2: 98% (04 Aug 2024 18:00) (73% - 98%)    Parameters below as of 04 Aug 2024 19:00  Patient On (Oxygen Delivery Method): BiPAP/CPAP, 16/8    O2 Concentration (%): 100    TESTS & MEASUREMENTS:                        10.1   22.14 )-----------( 166      ( 04 Aug 2024 04:35 )             31.6     08-04    143  |  98  |  19  ----------------------------<  198<H>  3.7   |  24  |  1.0    Ca    7.7<L>      04 Aug 2024 04:35  Mg     1.6     08-04    TPro  4.9<L>  /  Alb  2.4<L>  /  TBili  0.6  /  DBili  x   /  AST  138<H>  /  ALT  76<H>  /  AlkPhos  49  08-04    LIVER FUNCTIONS - ( 04 Aug 2024 04:35 )  Alb: 2.4 g/dL / Pro: 4.9 g/dL / ALK PHOS: 49 U/L / ALT: 76 U/L / AST: 138 U/L / GGT: x             Urinalysis with Rflx Culture (collected 08-03-24 @ 08:42)    Culture - Blood (collected 08-03-24 @ 00:32)  Source: .Blood Blood-Peripheral  Preliminary Report (08-04-24 @ 09:02):    No growth at 24 hours    Culture - Blood (collected 08-03-24 @ 00:32)  Source: .Blood Blood-Peripheral  Preliminary Report (08-04-24 @ 09:02):    No growth at 24 hours      Urinalysis Basic - ( 04 Aug 2024 04:35 )    Color: x / Appearance: x / SG: x / pH: x  Gluc: 198 mg/dL / Ketone: x  / Bili: x / Urobili: x   Blood: x / Protein: x / Nitrite: x   Leuk Esterase: x / RBC: x / WBC x   Sq Epi: x / Non Sq Epi: x / Bacteria: x          Social History:  Tobacco Use: No  Alcohol Use: No  Drug Use: No    RADIOLOGY & ADDITIONAL TESTS:  Personal review of radiological diagnostics performed  Echo and EKG results noted when applicable.     MEDICATIONS:  acetaminophen     Tablet .. 650 milliGRAM(s) Oral every 6 hours PRN  chlorhexidine 2% Cloths 1 Application(s) Topical <User Schedule>  gabapentin 300 milliGRAM(s) Oral two times a day  hydrocortisone sodium succinate Injectable 100 milliGRAM(s) IV Push every 8 hours  levETIRAcetam  IVPB 750 milliGRAM(s) IV Intermittent two times a day  linezolid  IVPB 600 milliGRAM(s) IV Intermittent every 12 hours  meropenem  IVPB 1000 milliGRAM(s) IV Intermittent every 8 hours  midodrine. 10 milliGRAM(s) Oral three times a day  norepinephrine Infusion 0.05 MICROgram(s)/kG/Min IV Continuous <Continuous>  pantoprazole  Injectable 40 milliGRAM(s) IV Push every 12 hours  phenylephrine    Infusion 5 MICROgram(s)/kG/Min IV Continuous <Continuous>  vasopressin Infusion 0.04 Unit(s)/Min IV Continuous <Continuous>      ANTIBIOTICS:  linezolid  IVPB 600 milliGRAM(s) IV Intermittent every 12 hours  meropenem  IVPB 1000 milliGRAM(s) IV Intermittent every 8 hours

## 2024-08-04 NOTE — PROGRESS NOTE ADULT - SUBJECTIVE AND OBJECTIVE BOX
Gastroenterology progress note:     Patient is a 59y old  Female who presents with a chief complaint of      Admitted on: 08-03-24    We are following the patient for: anemia       Interval History: no Bm in 24 hours, on 3 pressors       PAST MEDICAL & SURGICAL HISTORY:  Down syndrome      No significant past surgical history          MEDICATIONS  (STANDING):  chlorhexidine 2% Cloths 1 Application(s) Topical <User Schedule>  gabapentin 300 milliGRAM(s) Oral two times a day  hydrocortisone sodium succinate Injectable 100 milliGRAM(s) IV Push every 8 hours  levETIRAcetam  IVPB 750 milliGRAM(s) IV Intermittent two times a day  linezolid  IVPB 600 milliGRAM(s) IV Intermittent every 12 hours  meropenem  IVPB 1000 milliGRAM(s) IV Intermittent every 8 hours  midodrine. 10 milliGRAM(s) Oral three times a day  norepinephrine Infusion 0.05 MICROgram(s)/kG/Min (2.2 mL/Hr) IV Continuous <Continuous>  pantoprazole  Injectable 40 milliGRAM(s) IV Push every 12 hours  phenylephrine    Infusion 5 MICROgram(s)/kG/Min (44 mL/Hr) IV Continuous <Continuous>  vasopressin Infusion 0.04 Unit(s)/Min (6 mL/Hr) IV Continuous <Continuous>    MEDICATIONS  (PRN):  acetaminophen     Tablet .. 650 milliGRAM(s) Oral every 6 hours PRN Temp greater or equal to 38C (100.4F)      Allergies  No Known Allergies      Review of Systems:   Cardiovascular:  No Chest Pain, No Palpitations  Respiratory:  No Cough, No Dyspnea  Gastrointestinal:  As described in HPI  Skin:  No Skin Lesions, No Jaundice  Neuro:  No Syncope, No Dizziness    Physical Examination:  T(C): 38.6 (08-04-24 @ 11:00), Max: 39.7 (08-03-24 @ 12:30)  HR: 107 (08-04-24 @ 11:30) (101 - 153)  BP: 113/62 (08-04-24 @ 11:00) (65/38 - 156/67)  RR: 24 (08-04-24 @ 11:30) (24 - 47)  SpO2: 94% (08-04-24 @ 11:30) (73% - 100%)  Weight (kg): 47.1 (08-03-24 @ 12:30)    08-03-24 @ 07:01  -  08-04-24 @ 07:00  --------------------------------------------------------  IN: 8220.5 mL / OUT: 5074 mL / NET: 3146.5 mL    08-04-24 @ 07:01  -  08-04-24 @ 11:46  --------------------------------------------------------  IN: 1187.3 mL / OUT: 1300 mL / NET: -112.7 mL        GENERAL: AAOx0.  HEAD:  Atraumatic, Normocephalic  EYES: conjunctiva and sclera clear  NECK: Supple, no JVD or thyromegaly  CHEST/LUNG:dec BS  HEART: Regular rate and rhythm; normal S1, S.  ABDOMEN: PEG+ Soft, nontender, nondistended; Bowel sounds present  NEUROLOGY: No asterixis or tremor.   SKIN: Intact, no jaundice     Data:                        10.1   22.14 )-----------( 166      ( 04 Aug 2024 04:35 )             31.6     Hgb trend:  10.1  08-04-24 @ 04:35  12.5  08-03-24 @ 11:35  9.7  08-03-24 @ 09:03  12.9  08-03-24 @ 00:32      08-03-24 @ 07:01  -  08-04-24 @ 07:00  --------------------------------------------------------  IN: 313 mL      08-04    143  |  98  |  19  ----------------------------<  198<H>  3.7   |  24  |  1.0    Ca    7.7<L>      04 Aug 2024 04:35  Mg     1.6     08-04    TPro  4.9<L>  /  Alb  2.4<L>  /  TBili  0.6  /  DBili  x   /  AST  138<H>  /  ALT  76<H>  /  AlkPhos  49  08-04    Liver panel trend:  TBili 0.6   /      /   ALT 76   /   AlkP 49   /   Tptn 4.9   /   Alb 2.4    /   DBili --      08-04  TBili 1.0   /   AST 82   /   ALT 29   /   AlkP 80   /   Tptn 5.4   /   Alb 2.6    /   DBili --      08-03  TBili 0.8   /   AST 37   /   ALT 18   /   AlkP 70   /   Tptn 4.9   /   Alb 2.2    /   DBili --      08-03  TBili 0.3   /   AST 24   /   ALT 23   /   AlkP 136   /   Tptn 7.4   /   Alb 3.4    /   DBili --      08-03      PT/INR - ( 03 Aug 2024 00:32 )   PT: 12.50 sec;   INR: 1.10 ratio         PTT - ( 03 Aug 2024 00:32 )  PTT:26.8 sec    Urinalysis with Rflx Culture (collected 03 Aug 2024 08:42)    Culture - Blood (collected 03 Aug 2024 00:32)  Source: .Blood Blood-Peripheral  Preliminary Report (04 Aug 2024 09:02):    No growth at 24 hours    Culture - Blood (collected 03 Aug 2024 00:32)  Source: .Blood Blood-Peripheral  Preliminary Report (04 Aug 2024 09:02):    No growth at 24 hours

## 2024-08-04 NOTE — PROGRESS NOTE ADULT - SUBJECTIVE AND OBJECTIVE BOX
CHIEF COMPLAINT:  Patient is a 59y old  Female who presents with a chief complaint of     INTERVAL HISTORY/OVERNIGHT EVENTS:  No events overnight  Pt still on Bipap     ======================  MEDICATIONS:  chlorhexidine 2% Cloths 1 Application(s) Topical <User Schedule>  gabapentin 300 milliGRAM(s) Oral two times a day  hydrocortisone sodium succinate Injectable 100 milliGRAM(s) IV Push every 8 hours  levETIRAcetam  IVPB 750 milliGRAM(s) IV Intermittent two times a day  linezolid  IVPB 600 milliGRAM(s) IV Intermittent every 12 hours  meropenem  IVPB 1000 milliGRAM(s) IV Intermittent every 8 hours  midodrine. 10 milliGRAM(s) Oral three times a day  pantoprazole  Injectable 40 milliGRAM(s) IV Push every 12 hours    DRIPS:  norepinephrine Infusion 0.05 MICROgram(s)/kG/Min (2.2 mL/Hr) IV Continuous <Continuous>  phenylephrine    Infusion 5 MICROgram(s)/kG/Min (44 mL/Hr) IV Continuous <Continuous>  vasopressin Infusion 0.04 Unit(s)/Min (6 mL/Hr) IV Continuous <Continuous>    PRN:       ======================  PHYSICAL EXAMINATION:  GEN:  nad.   HEENT:  eomi. ncat  PULM:  CTA B/L no W/R/R  CARD: s1, s2, irregularly irregular, regular rate  ABD: +bs. ntnd  EXT:  no new rashes.  no C/C/E  NEURO:  no new focal deficits.   ======================  OBJECTIVE:        VS:  T(F): 101.2 (08-04 @ 19:00), Max: 103.2 (08-03 @ 23:00)  HR: 107 (08-04 @ 20:00) (93 - 141)  BP: 142/64 (08-04 @ 20:00) (65/38 - 156/67)  RR: 25 (08-04 @ 20:00) (21 - 47)  SpO2: 97% (08-04 @ 20:00) (73% - 98%)  CVP(mm Hg): --  CO: --  CI: --  PA: --  PCWP: --    I/O:      08-03 @ 07:01  -  08-04 @ 07:00  --------------------------------------------------------  IN: 8220.5 mL / OUT: 5074 mL / NET: 3146.5 mL    08-04 @ 07:01  -  08-04 @ 20:24  --------------------------------------------------------  IN: 2187.6 mL / OUT: 1926 mL / NET: 261.6 mL        Weight trend:  Weight (kg): 47.1 (08-03)    ======================    LABS:  ABG - ( 04 Aug 2024 09:32 )  pH, Arterial: 7.40  pH, Blood: x     /  pCO2: 53    /  pO2: 74    / HCO3: 33    / Base Excess: 6.5   /  SaO2: 96.4                                    10.1   22.14 )-----------( 166      ( 04 Aug 2024 04:35 )             31.6     08-04    143  |  98  |  19  ----------------------------<  198<H>  3.7   |  24  |  1.0    Ca    7.7<L>      04 Aug 2024 04:35  Mg     1.6     08-04    TPro  4.9<L>  /  Alb  2.4<L>  /  TBili  0.6  /  DBili  x   /  AST  138<H>  /  ALT  76<H>  /  AlkPhos  49  08-04    LIVER FUNCTIONS - ( 04 Aug 2024 04:35 )  Alb: 2.4 g/dL / Pro: 4.9 g/dL / ALK PHOS: 49 U/L / ALT: 76 U/L / AST: 138 U/L / GGT: x           PT/INR - ( 03 Aug 2024 00:32 )   PT: 12.50 sec;   INR: 1.10 ratio         PTT - ( 03 Aug 2024 00:32 )  PTT:26.8 sec          Urinalysis Basic - ( 04 Aug 2024 04:35 )    Color: x / Appearance: x / SG: x / pH: x  Gluc: 198 mg/dL / Ketone: x  / Bili: x / Urobili: x   Blood: x / Protein: x / Nitrite: x   Leuk Esterase: x / RBC: x / WBC x   Sq Epi: x / Non Sq Epi: x / Bacteria: x        Cultures:    Culture - Blood (collected 08-03)  Source: .Blood Blood-Peripheral  Preliminary Report:    No growth at 24 hours    Culture - Blood (collected 08-03)  Source: .Blood Blood-Peripheral  Preliminary Report:    No growth at 24 hours

## 2024-08-04 NOTE — PROGRESS NOTE ADULT - ASSESSMENT
# Septic Shock  # Sinus tachycardia  # Acute hypoxemic respiratory failure  # Acute aspiration pneumonia  # Iatrogenic R PTX    Plan:      CNS: No sedation; AEDs;   -currently On BIPAP and not awake    HEENT: Oral care.     CARDIOVASCULAR:  -will try to wean off Ming;  -c/w Levophed and phenyelphrne      PULMONARY:   Chest tube to suction, no air leak.   -c/w BIPAP with 100%Fio2.   -ABG this AM improved  - f/u palliative consult   -possible CMO    GASTROINTESTINAL:   -NPO while on BiPAP  -c/w protonix     GENITOURINARY/RENAL:   -currently has  positive fluid balance  -monitor AM Labs     INFECTIOUS:   -F/u blood cultures  -F/u ID recs   -c/w  Meropenem and Linezolid.       CODE STATUS: DNR/DNI;  DISPO: MICU care; poor prognosis

## 2024-08-04 NOTE — PROGRESS NOTE ADULT - ASSESSMENT
· Assessment	  57 yo F with pmhx of down syndrome, anemia, cerebral palsy, seizure disorder, osteoporosis, hypotension on midodrine, nonverbal at baseline who was BIBEMS from group home for  respiratory distress with complaints of fever and hypoxia that began earlier in the evening. Pt was recently admitted (June 26-July 10) for AHFH due to suspected recurrent aspiration pna, had severe sepsis on admission, was treated by infectious disease with zyvox and meropenum.     ER:  T104.2, HR: 160 , on Levo  Upon arrival to ED, pt was placed on HFNC and desatted to 50s, switched to BIPAP    IMPRESSION/RECOMMENDATIONS  Immunosuppression with Obesity could lead to a poor clinical outcome  Acute  illness  which poses a threat to life or bodily function without treatment   Acute hypoxic respiratory failure with ARDS  CXR with increasing opacities right>left  Pt is a DNR/DNI  Septic shock ( severe sepsis ON LEVO with serum lactate 7.9  )   RML/LLL bacterial PNA secondary to aspiration  Pyelonephritis possible ( UA p )   wbc 22.1 with significant left shift ( metamyelocytes/bands )  8/3 R IJ catheter placed th subsequent small right pneumothorax  8/3 R CT with resolution of pneumothorax  8/3 BCx NG  Nares ORSA NG  Urine for strep pneumonia/legionella antigen NG    -Sputum gm stain, cultures  -Off loading to prevent pressure sores and preventive measures to avoid aspiration  -Meropenem 1 gm iv q8h  -Linezolid 600 mg iv q12h

## 2024-08-05 NOTE — CHART NOTE - NSCHARTNOTEFT_GEN_A_CORE
Consult received and chart reviewed; palliative care will formally evaluate patient Monday AM, please call [phone] in the interim for any acute needs. Thank you.   ______________  Lei Capone MD  Palliative Medicine  [facility]   of Geriatric and Palliative Medicine  [phone]
Water sealed pigtail this morning, please keep on waterseal and obtain daily AM CXR
Patient has iatrogenic pneumothorax during central line placemet , surgery team called. Emergency chest tube placed.   BP stable   Patient had aspiartion event , so lungs are very wet . Saturating 82-85% on high flow .
Transfer Note    Transfer from: SDU  Transfer to: ICU    >>HPI:  Patient is a 59 yo female with PMHx of down syndrome, anemia, cerebral palsy, seizure disorder, osteoporosis, hypotension on midodrine, nonverbal at baseline who was BIBEMS from group home for  respiratory distress with complaints of fever and hypoxia that began earlier in the evening. Admitted for Sepsis POA and AHRF 2/2 Aspiration PNA. Medicine team placed Right sided IJ central line for pressor support, however, likely caused iatrogenic PTX, surgery consulted for pigtail placement.     Vital Signs Last 24 Hrs  T(F): 104.2 (03 Aug 2024 01:30), Max: 104.2 (03 Aug 2024 01:30)  HR: 160 (03 Aug 2024 02:20) (160 - 171)  BP: 131/68 (03 Aug 2024 02:20) (131/68 - 131/68)    Upon arrival to ED, pt was placed on HFNC and desatted to 50s, switched to BIPAP. Given high risk of recurrent aspiration, pt was suctioned and  switched over back to HFNC now satting 98%. Pt became hypotensive MAP 57, started on Phenylephrine in the ED via right IJ central line. Px noted and surgery caled for pigtail.    Labs: WBC 29K, Lactate 3> 3.9> 5.1   CXR: RLL consolidation (fu official read)    s/p 2L IVF, Cefepime and Vanc in ED    Admitted for Sepsis POA and AHRF 2/2 Aspiration PNA     >>COURSE:    Hospital stay complicated by hypotension and desaturation. Pt required central line placement for pressor support. Right sided IJ central line placement was complicated by PTX. Thoracic placed pigtail and lung was re-expanded.  This AM (8/3/2024) pt sustained hypotension with active aspiration requiring triple pressor support, currently on phenyl, vaso, levo. S/p 4L IV fluids. S/p stress dose hydrocort. Secretions were dark grey in color concerning for upper GI bleed. S/p 1pRBCs. Pt remained unstable with fluctuating BP. Transfer to ICU for closer monitor was discussed with pulm fellow on call.      >>ASSESSMENT & PLAN:     59 yo F with pmhx of down syndrome, anemia, cerebral palsy, seizure disorder, osteoporosis, hypotension on midodrine, nonverbal at baseline who was BIBEMS from group home for  respiratory distress with complaints of fever and hypoxia that began earlier in the evening. Admitted for Sepsis POA and AHRF 2/2 Aspiration PNA. Stay complicated by iatrogenic PTX and hypotension requiring triple pressor support.    #AHRF 2/2 recurrent Aspiration PNA   #Sepsis POA  #Hypotension  -s/p Cefepime, Vanc in ED  -Started on Phenylephrine  -c/w home midodrine 10mg TID  -HFNC 60/100   -recent admission for AHRF treated with Meropenam and Zyvox  -c/w Meropenam and linez for now, appreciate ID recs    -Fu final Bcx, Sputum Cx, mrsa nares, urine legionella strep   -s/p 4L IVF  -c/w pressor support, wean as tolerated  norepinephrine Infusion 0.05 MICROgram(s)/kG/Min (2.2 mL/Hr) IV Continuous <Continuous>  phenylephrine    Infusion 0.1 MICROgram(s)/kG/Min (1.76 mL/Hr) IV Continuous <Continuous>  vasopressin Infusion 0.04 Unit(s)/Min (6 mL/Hr) IV Continuous <Continuous>  -A-line placed  -repeat ABG, lactate, CBC, BMP  -pending transfer to ICU    #Downsyndrome  #Nonverbal  #Cerebral palsy  -c/w gabapentin  -c/w home meds    #Seziure  -C/w Keppra 1000 q12    #Osteoporosis  -c/w home meds    #DVT ppx  #Diet-NPO   #Dispo-SDU    >>PENDINGS:  - STAT ABG, lactate, CBC, BMP  - GI recs re upper GIB  - ICU transfer (transfer plan discussed with Dr. Garcia)    Vital Signs Last 24 Hrs  T(C): 39.2 (03 Aug 2024 05:09), Max: 40.1 (03 Aug 2024 01:30)  T(F): 102.5 (03 Aug 2024 05:09), Max: 104.2 (03 Aug 2024 01:30)  HR: 144 (03 Aug 2024 07:15) (144 - 171)  BP: 110/70 (03 Aug 2024 07:15) (46/52 - 135/68)  BP(mean): 81 (03 Aug 2024 07:15) (32 - 81)  RR: 39 (03 Aug 2024 05:09) (31 - 49)  SpO2: 89% (03 Aug 2024 05:09) (79% - 100%)    Parameters below as of 03 Aug 2024 05:09  Patient On (Oxygen Delivery Method): nasal cannula, high flow, as well as NRB  O2 Flow (L/min): 60  O2 Concentration (%): 100  I&O's Summary    MEDICATIONS  (STANDING):  acetaminophen  Suppository .. 650 milliGRAM(s) Rectal once  gabapentin 300 milliGRAM(s) Oral two times a day  hydrocortisone sodium succinate Injectable 100 milliGRAM(s) IV Push every 8 hours  levETIRAcetam  IVPB 750 milliGRAM(s) IV Intermittent two times a day  midodrine. 10 milliGRAM(s) Oral three times a day  norepinephrine Infusion 0.05 MICROgram(s)/kG/Min (2.2 mL/Hr) IV Continuous <Continuous>  pantoprazole  Injectable 40 milliGRAM(s) IV Push every 12 hours  phenylephrine    Infusion 0.1 MICROgram(s)/kG/Min (1.76 mL/Hr) IV Continuous <Continuous>  sodium bicarbonate  Infusion 0.32 mEq/kG/Hr (100 mL/Hr) IV Continuous <Continuous>  sodium bicarbonate  Injectable 100 milliEquivalent(s) IV Push once  vasopressin Infusion 0.04 Unit(s)/Min (6 mL/Hr) IV Continuous <Continuous>    MEDICATIONS  (PRN):      LABS                                            9.7                   Neurophils% (auto):   53.4   (08-03 @ 09:03):    10.81)-----------(284          Lymphocytes% (auto):  5.2                                           34.0                   Eosinphils% (auto):   0.0      Manual%: Neutrophils x    ; Lymphocytes x    ; Eosinophils x    ; Bands%: 10.3 ; Blasts x                                    140    |  105    |  33                  Calcium: 8.2   / iCa: x      (08-03 @ 09:03)    ----------------------------<  134       Magnesium: 1.6                              5.0     |  19     |  1.2              Phosphorous: x        TPro  4.9    /  Alb  2.2    /  TBili  0.8    /  DBili  x      /  AST  37     /  ALT  18     /  AlkPhos  70     03 Aug 2024 09:03    ( 08-03 @ 00:32 )   PT: 12.50 sec;   INR: 1.10 ratio  aPTT: 26.8 sec

## 2024-08-05 NOTE — ADVANCED PRACTICE NURSE CONSULT - RECOMMEDATIONS
Cleanse wounds to bilateral feet with soap and water.   Pat dry, apply Medihoney, cover with Xeroform, wrap with Kerlix twice a day and prn for soiling.     Maintain pressure injury prevention.   Keep skin clean.   Maintain incontinence care.   Monitor skin for changes and notify provider

## 2024-08-05 NOTE — PROGRESS NOTE ADULT - SUBJECTIVE AND OBJECTIVE BOX
IANMARINO  59y, Female    All available historical data reviewed    OVERNIGHT EVENTS:  fevers  on BIPAP  non responsive to all stimuli  on Levo  2 BMs  R IJ catheter with no erythema/purulence/palpable cord.   R CT in place    ROS:  unable to obtain history secondary to patient's mental status     VITALS:  T(F): 99.7, Max: 102.3 (08-04-24 @ 16:00)  HR: 111  BP: 98/65  RR: 34Vital Signs Last 24 Hrs  T(C): 37.6 (05 Aug 2024 08:00), Max: 39.1 (04 Aug 2024 16:00)  T(F): 99.7 (05 Aug 2024 08:00), Max: 102.3 (04 Aug 2024 16:00)  HR: 111 (05 Aug 2024 08:30) (93 - 125)  BP: 98/65 (05 Aug 2024 08:30) (98/65 - 153/109)  BP(mean): 75 (05 Aug 2024 08:30) (74 - 117)  RR: 34 (05 Aug 2024 08:30) (21 - 41)  SpO2: 93% (05 Aug 2024 08:30) (87% - 99%)    Parameters below as of 05 Aug 2024 08:00  Patient On (Oxygen Delivery Method): BiPAP/CPAP        TESTS & MEASUREMENTS:                        10.0   19.26 )-----------( 103      ( 05 Aug 2024 04:10 )             31.2     08-05    136  |  93<L>  |  17  ----------------------------<  142<H>  2.9<L>   |  30  |  0.5<L>    Ca    8.1<L>      05 Aug 2024 04:10  Mg     2.0     08-05    TPro  5.6<L>  /  Alb  2.5<L>  /  TBili  0.7  /  DBili  x   /  AST  202<H>  /  ALT  189<H>  /  AlkPhos  63  08-05    LIVER FUNCTIONS - ( 05 Aug 2024 04:10 )  Alb: 2.5 g/dL / Pro: 5.6 g/dL / ALK PHOS: 63 U/L / ALT: 189 U/L / AST: 202 U/L / GGT: x             Urinalysis with Rflx Culture (collected 08-03-24 @ 08:42)    Culture - Blood (collected 08-03-24 @ 00:32)  Source: .Blood Blood-Peripheral  Preliminary Report (08-04-24 @ 09:02):    No growth at 24 hours    Culture - Blood (collected 08-03-24 @ 00:32)  Source: .Blood Blood-Peripheral  Preliminary Report (08-04-24 @ 09:02):    No growth at 24 hours      Urinalysis Basic - ( 05 Aug 2024 04:10 )    Color: x / Appearance: x / SG: x / pH: x  Gluc: 142 mg/dL / Ketone: x  / Bili: x / Urobili: x   Blood: x / Protein: x / Nitrite: x   Leuk Esterase: x / RBC: x / WBC x   Sq Epi: x / Non Sq Epi: x / Bacteria: x          Social History:  Tobacco Use: No  Alcohol Use: No  Drug Use: No    RADIOLOGY & ADDITIONAL TESTS:  Personal review of radiological diagnostics performed  Echo and EKG results noted when applicable.     MEDICATIONS:  acetaminophen     Tablet .. 650 milliGRAM(s) Oral every 6 hours PRN  chlorhexidine 2% Cloths 1 Application(s) Topical <User Schedule>  gabapentin 300 milliGRAM(s) Oral two times a day  hydrocortisone sodium succinate Injectable 100 milliGRAM(s) IV Push every 8 hours  levETIRAcetam  IVPB 750 milliGRAM(s) IV Intermittent two times a day  linezolid  IVPB 600 milliGRAM(s) IV Intermittent every 12 hours  meropenem  IVPB 1000 milliGRAM(s) IV Intermittent every 8 hours  midodrine. 10 milliGRAM(s) Oral three times a day  norepinephrine Infusion 0.05 MICROgram(s)/kG/Min IV Continuous <Continuous>  pantoprazole  Injectable 40 milliGRAM(s) IV Push every 12 hours  phenylephrine    Infusion 5 MICROgram(s)/kG/Min IV Continuous <Continuous>  vasopressin Infusion 0.04 Unit(s)/Min IV Continuous <Continuous>      ANTIBIOTICS:  linezolid  IVPB 600 milliGRAM(s) IV Intermittent every 12 hours  meropenem  IVPB 1000 milliGRAM(s) IV Intermittent every 8 hours

## 2024-08-05 NOTE — CONSULT NOTE ADULT - ASSESSMENT
57 yo F with pmhx of down syndrome, anemia, cerebral palsy, seizure disorder, osteoporosis, hypotension on midodrine, nonverbal at baseline who was BIBEMS from group home for  respiratory distress with complaints of fever and hypoxia that began earlier in the evening. Pt was recently admitted (June 26-July 10) for AHFH due to suspected recurrent aspiration pna, had severe sepsis on admission, was treated by infectious disease with zyvox and meropenum.     ER:  T104.2, HR: 160 , on Levo  Upon arrival to ED, pt was placed on HFNC and desatted to 50s, switched to BIPAP    IMPRESSION/RECOMMENDATIONS  Immunosuppression with Obesity could lead to a poor clinical outcome  Acute  illness  which poses a threat to life or bodily function without treatment   Acute hypoxic respiratory failure   Pt is a DNR/DNI  Septic shock ( severe sepsis ON LEVO with serum lactate 7.9  )   RML/LLL bacterial PNA secondary to aspiration  Pyelonephritis possible ( UA p )   wbc 29>10.8 with significant left shift ( metamyelocytes/bands )  8/3 R IJ catheter placed th subsequent small right pneumothorax  8/3 R CT with resolution of pneumothorax    -Urine for strep pneumonia/legionella antigen  -Nares ORSA  -Sputum gm stain, cultures  -BCx   -UA  -UCx  -Off loading to prevent pressure sores and preventive measures to avoid aspiration  -bedside PT/Rehab. Out of bed to chair if possible.  -Meropenem 1 gm iv q8h  -Linezolid 600 mg iv q12h    Prognosis is extremely poor
MARINO SOSA is a 59y woman with a medical history significant for Down syndrome, cerebral palsy, and chronic recurrent aspiration events who presented initially with acute respiratory distress and fever, and is now in the critical care unit for septic shock 2/2 aspiration pneumonia    Impression    # Septic Shock  # Sinus tachycardia  # Acute hypoxemic respiratory failure  # Acute aspiration pneumonia  # Iatrogenic R PTX    Plan:      CNS:  Avoid oversedation, continue AEDs  Mental status baseline  Palliative consult, poor prognosis    HEENT:  Oral care. Aspiration precautions    CARDIOVASCULAR  Vasopressors: Levophed, Vasopressin, Phenylephrine  Add-on stress-dose steroid for pressor refractory shock  If HR >140 consistently, add-on esmolol gtt for rate control.  More effective with AF but may be used in uncontrolled sinus tach  A-line and CVC placement for support    PULMONARY  HOB @ 45 degrees, aspiration precautions  NRB + HFNC, severe hypoxemia  Wean FiO2 as tolerated to maintain spO2 92-96%   Aggressive pulmonary toilet    GASTROINTESTINAL  GI prophylaxis: PPI daily  Feeds: NPO for now  BM: Regimen PRN      GENITOURINARY/RENAL  De Jesus: maintain  Monitor I&O, renal function, electrolytes, replete PRN    INFECTIOUS  Broad spectrum gram negative coverage: Meropenem & Linezolid  BCx, UCx, sputum culture  Procalcitonin, MRSA nares, ID consult    HEMATOLOGIC  DVT ppx: heparin SQ    ENDOCRINE  Follow up FS.  Insulin protocol as needed.  BG goal 140-180    MSK/DERM  PT/OT when cooperative      CODE STATUS: DNR/DNI  DISPO: MICU  LINES: R-IJ TLC, R chest tube
 57 yo F with pmhx of down syndrome, anemia, cerebral palsy, seizure disorder, osteoporosis, dysphagia s/p PEG hypotension on midodrine, nonverbal at baseline who was BIBEMS from group home for  respiratory distress with complaints of fever and hypoxia that began earlier in the evening. Pt was recently admitted (June 26-July 10) for AHFH due to suspected recurrent aspiration pna, had severe sepsis on admission.Admitted for Sepsis shock and AHRF s/p intubation on multiple pressors. GI consulted for acute drop in hb .     #Acute drop in hb - dilutional vs r/o bleed  #AHRF with septic shock on 3 pressors  - Baseline hemoglobin - 12>9.7>12 s/p 1u prbc so far  - Hemoglobin on admission - 12  - PEG lavage with dark bile, no blood; CHARITO: empty vault    #Rec  - Keep NPO  - IV PPI BID 40mg  - will recommend CTAP to r/o any other source of bleed as low suspicion for GI bleed  - Maintain active Type and screen  - Trend H&H BID  - Please place x2 18G IVs  - Please start IV fluids (SBP >90)   - Please correct electrolytes (Target Na 135-145, Mg 1.7-2.2, K 3.5-5)  - Please correct INR to <1.5  - Please target Hb  >7   - Please avoid any NSAIDs  - If any unstable bleed, please call GI stat  - GOC discussion    
58F with PMH of Down syndrome, anemia, CP, seizures disorder, hypotension, nonverbal at baseline, many admissions for AHRF from aspiraiton pneumonia and sepsis, here with AHRF, likely aspiration, requiring NIV, in septic shock, on three pressors in ICU. Patient is DNR/DNI from prior admission, with MOLST checklist completed. Palliative called for GOC.    - c/w pressors for now, but capped, no further escalation (can decrease if medically indicated)  - no further escalation of NIV settings  - see GOC above, d/w MHLS, ICU attending and fellow; MOLST checklist completed  - will follow  ______________  Lei Capone MD  Palliative Medicine  [facility]   of Geriatric and Palliative Medicine  [phone]

## 2024-08-05 NOTE — PROGRESS NOTE ADULT - SUBJECTIVE AND OBJECTIVE BOX
Patient is a 59y old  Female who presents with a chief complaint of       Over Night Events:    Febrile 102.3   On BIPAP 100% Fio2   RR in the 30s        ROS:  See HPI    PHYSICAL EXAM    ICU Vital Signs Last 24 Hrs  T(C): 37.6 (05 Aug 2024 08:00), Max: 39.1 (04 Aug 2024 16:00)  T(F): 99.7 (05 Aug 2024 08:00), Max: 102.3 (04 Aug 2024 16:00)  HR: 112 (05 Aug 2024 08:00) (93 - 125)  BP: 106/59 (05 Aug 2024 08:00) (98/65 - 153/109)  BP(mean): 77 (05 Aug 2024 08:00) (74 - 117)  ABP: 92/66 (05 Aug 2024 08:00) (77/52 - 135/74)  ABP(mean): 77 (05 Aug 2024 08:00) (63 - 104)  RR: 38 (05 Aug 2024 08:00) (21 - 41)  SpO2: 90% (05 Aug 2024 08:00) (87% - 99%)    O2 Parameters below as of 05 Aug 2024 08:00  Patient On (Oxygen Delivery Method): BiPAP/CPAP            General: Distress  HEENT: DANNY             Lymphatic system: No cervical LN   Lungs: Bilateral BS  Cardiovascular: Regular   Gastrointestinal: Soft, Positive BS  Extremities: No clubbing.  No movement   Skin: Warm, intact  Neurological: MS absent       08-04-24 @ 07:01  -  08-05-24 @ 07:00  --------------------------------------------------------  IN:    Enteral Tube Flush: 240 mL    IV PiggyBack: 1000 mL    Lactated Ringers: 150 mL    Norepinephrine: 56.4 mL    Phenylephrine: 1179.3 mL    Sodium Bicarbonate: 450 mL    Vasopressin: 144 mL  Total IN: 3219.7 mL    OUT:    Chest Tube (mL): 101 mL    Incontinent per Collection Bag (mL): 2800 mL  Total OUT: 2901 mL    Total NET: 318.7 mL          LABS:                            10.0   19.26 )-----------( 103      ( 05 Aug 2024 04:10 )             31.2                                               08-05    136  |  93<L>  |  17  ----------------------------<  142<H>  2.9<L>   |  30  |  0.5<L>    Ca    8.1<L>      05 Aug 2024 04:10  Mg     2.0     08-05    TPro  5.6<L>  /  Alb  2.5<L>  /  TBili  0.7  /  DBili  x   /  AST  202<H>  /  ALT  189<H>  /  AlkPhos  63  08-05                                             Urinalysis Basic - ( 05 Aug 2024 04:10 )    Color: x / Appearance: x / SG: x / pH: x  Gluc: 142 mg/dL / Ketone: x  / Bili: x / Urobili: x   Blood: x / Protein: x / Nitrite: x   Leuk Esterase: x / RBC: x / WBC x   Sq Epi: x / Non Sq Epi: x / Bacteria: x                                                  LIVER FUNCTIONS - ( 05 Aug 2024 04:10 )  Alb: 2.5 g/dL / Pro: 5.6 g/dL / ALK PHOS: 63 U/L / ALT: 189 U/L / AST: 202 U/L / GGT: x                                                  Urinalysis with Rflx Culture (collected 03 Aug 2024 08:42)    Culture - Blood (collected 03 Aug 2024 00:32)  Source: .Blood Blood-Peripheral  Preliminary Report (04 Aug 2024 09:02):    No growth at 24 hours    Culture - Blood (collected 03 Aug 2024 00:32)  Source: .Blood Blood-Peripheral  Preliminary Report (04 Aug 2024 09:02):    No growth at 24 hours                                                                                       ABG - ( 04 Aug 2024 09:32 )  pH, Arterial: 7.40  pH, Blood: x     /  pCO2: 53    /  pO2: 74    / HCO3: 33    / Base Excess: 6.5   /  SaO2: 96.4                MEDICATIONS  (STANDING):  chlorhexidine 2% Cloths 1 Application(s) Topical <User Schedule>  gabapentin 300 milliGRAM(s) Oral two times a day  hydrocortisone sodium succinate Injectable 100 milliGRAM(s) IV Push every 8 hours  levETIRAcetam  IVPB 750 milliGRAM(s) IV Intermittent two times a day  linezolid  IVPB 600 milliGRAM(s) IV Intermittent every 12 hours  meropenem  IVPB 1000 milliGRAM(s) IV Intermittent every 8 hours  midodrine. 10 milliGRAM(s) Oral three times a day  norepinephrine Infusion 0.05 MICROgram(s)/kG/Min (2.2 mL/Hr) IV Continuous <Continuous>  pantoprazole  Injectable 40 milliGRAM(s) IV Push every 12 hours  phenylephrine    Infusion 5 MICROgram(s)/kG/Min (44 mL/Hr) IV Continuous <Continuous>  potassium chloride   Powder 40 milliEquivalent(s) Oral every 2 hours  vasopressin Infusion 0.04 Unit(s)/Min (6 mL/Hr) IV Continuous <Continuous>    MEDICATIONS  (PRN):  acetaminophen     Tablet .. 650 milliGRAM(s) Oral every 6 hours PRN Temp greater or equal to 38C (100.4F)      Xrays: Right chest tube; infiltrates                                                                                     ECHO

## 2024-08-05 NOTE — PROGRESS NOTE ADULT - ASSESSMENT
Patient is a 59 yo female with PMHx of down syndrome, anemia, cerebral palsy, seizure disorder, osteoporosis, hypotension on midodrine, nonverbal at baseline who was BIBEMS from group home for  respiratory distress with complaints of fever and hypoxia that began earlier in the evening. Admitted for Sepsis POA and AHRF 2/2 Aspiration PNA. Medicine team placed Right sided IJ central line for pressor support, however, likely caused iatrogenic PTX s/p pigtail placement    Plan:  - CT to SXN  - Daily AM CXR   - Appreciate ID recommendations  - Monitor saturations  - Monitor Vital signs and Fever  - Monitor daily labs and replete as necessary  - Rest of care per primary team     *plan to be discussed with attending in AM*  GREEN TEAM SPECTRA [id number]

## 2024-08-05 NOTE — PROGRESS NOTE ADULT - ASSESSMENT
· Assessment	  57 yo F with pmhx of down syndrome, anemia, cerebral palsy, seizure disorder, osteoporosis, hypotension on midodrine, nonverbal at baseline who was BIBEMS from group home for  respiratory distress with complaints of fever and hypoxia that began earlier in the evening. Pt was recently admitted (June 26-July 10) for AHFH due to suspected recurrent aspiration pna, had severe sepsis on admission, was treated by infectious disease with zyvox and meropenum.     ER:  T104.2, HR: 160 , on Levo  Upon arrival to ED, pt was placed on HFNC and desatted to 50s, switched to BIPAP    IMPRESSION/RECOMMENDATIONS  Immunosuppression with Obesity could lead to a poor clinical outcome  Acute  illness  which poses a threat to life or bodily function without treatment   Acute hypoxic respiratory failure with ARDS. On Bipap  CXR with increasing opacities right>left  Pt is a DNR/DNI  Septic shock ( severe sepsis ON LEVO  ) with persistent fevers  RML/LLL bacterial PNA secondary to aspiration  Pyelonephritis possible ( UA p )   wbc 22.1 with significant left shift ( metamyelocytes/bands )  8/3 R IJ catheter placed th subsequent small right pneumothorax  8/3 R CT with resolution of pneumothorax  8/3 BCx NG  Nares ORSA NG  Urine for strep pneumonia/legionella antigen NG    -Sputum gm stain, cultures ( if can be obtained )  -Off loading to prevent pressure sores and preventive measures to avoid aspiration  -Meropenem 1 gm iv q8h  -Linezolid 600 mg iv q12h    Prognosis is very poor

## 2024-08-05 NOTE — PROGRESS NOTE ADULT - SUBJECTIVE AND OBJECTIVE BOX
INTERVAL HPI/OVERNIGHT EVENTS:   Febrile 102.3    Subjective:    ICU Vital Signs Last 24 Hrs  T(C): 37.6 (05 Aug 2024 08:00), Max: 39.1 (04 Aug 2024 16:00)  T(F): 99.7 (05 Aug 2024 08:00), Max: 102.3 (04 Aug 2024 16:00)  HR: 120 (05 Aug 2024 10:00) (93 - 120)  BP: 78/51 (05 Aug 2024 10:00) (78/51 - 153/109)  BP(mean): 60 (05 Aug 2024 10:00) (60 - 117)  ABP: 80/58 (05 Aug 2024 10:00) (77/52 - 131/78)  ABP(mean): 68 (05 Aug 2024 10:00) (63 - 104)  RR: 36 (05 Aug 2024 10:) (21 - 41)  SpO2: 90% (05 Aug 2024 10:00) (87% - 99%)    O2 Parameters below as of 05 Aug 2024 10:00  Patient On (Oxygen Delivery Method): BiPAP/CPAP          I&O's Summary    04 Aug 2024 07:01  -  05 Aug 2024 07:00  --------------------------------------------------------  IN: 3219.7 mL / OUT: 2901 mL / NET: 318.7 mL    05 Aug 2024 07:01  -  05 Aug 2024 11:06  --------------------------------------------------------  IN: 103.3 mL / OUT: 0 mL / NET: 103.3 mL          Daily     Daily Weight in k.5 (05 Aug 2024 05:00)    Adult Advanced Hemodynamics Last 24 Hrs  CVP(mm Hg): --  CVP(cm H2O): --  CO: --  CI: --  PA: --  PA(mean): --  PCWP: --  SVR: --  SVRI: --  PVR: --  PVRI: --    EKG/Telemetry Events:    MEDICATIONS  (STANDING):  chlorhexidine 2% Cloths 1 Application(s) Topical <User Schedule>  gabapentin 300 milliGRAM(s) Oral two times a day  hydrocortisone sodium succinate Injectable 100 milliGRAM(s) IV Push every 8 hours  levETIRAcetam  IVPB 750 milliGRAM(s) IV Intermittent two times a day  linezolid  IVPB 600 milliGRAM(s) IV Intermittent every 12 hours  meropenem  IVPB 1000 milliGRAM(s) IV Intermittent every 8 hours  midodrine. 10 milliGRAM(s) Oral three times a day  norepinephrine Infusion 0.05 MICROgram(s)/kG/Min (2.2 mL/Hr) IV Continuous <Continuous>  pantoprazole  Injectable 40 milliGRAM(s) IV Push every 12 hours  phenylephrine    Infusion 5 MICROgram(s)/kG/Min (44 mL/Hr) IV Continuous <Continuous>  vasopressin Infusion 0.04 Unit(s)/Min (6 mL/Hr) IV Continuous <Continuous>    MEDICATIONS  (PRN):  acetaminophen     Tablet .. 650 milliGRAM(s) Oral every 6 hours PRN Temp greater or equal to 38C (100.4F)      PHYSICAL EXAM:  GENERAL:   HEAD:  Atraumatic, Normocephalic  EYES: EOMI, PERRLA, conjunctiva and sclera clear  NECK: Supple, No JVD, Normal thyroid, no enlarged nodes  NERVOUS SYSTEM:  Alert & Awake.   CHEST/LUNG: B/L good air entry; No rales, rhonchi, or wheezing  HEART: S1S2 normal, no S3, Regular rate and rhythm; No murmurs  ABDOMEN: Soft, Nontender, Nondistended; Bowel sounds present  EXTREMITIES:  2+ Peripheral Pulses, No clubbing, cyanosis, or edema  LYMPH: No lymphadenopathy noted  SKIN: No rashes or lesions    LABS:                        10.0   19.26 )-----------( 103      ( 05 Aug 2024 04:10 )             31.2     08-05    136  |  93<L>  |  17  ----------------------------<  142<H>  2.9<L>   |  30  |  0.5<L>    Ca    8.1<L>      05 Aug 2024 04:10  Mg     2.0     08-05    TPro  5.6<L>  /  Alb  2.5<L>  /  TBili  0.7  /  DBili  x   /  AST  202<H>  /  ALT  189<H>  /  AlkPhos  63  08-05    LIVER FUNCTIONS - ( 05 Aug 2024 04:10 )  Alb: 2.5 g/dL / Pro: 5.6 g/dL / ALK PHOS: 63 U/L / ALT: 189 U/L / AST: 202 U/L / GGT: x             CAPILLARY BLOOD GLUCOSE      POCT Blood Glucose.: 151 mg/dL (05 Aug 2024 05:57)  POCT Blood Glucose.: 156 mg/dL (04 Aug 2024 22:08)  POCT Blood Glucose.: 182 mg/dL (04 Aug 2024 13:06)    ABG - ( 04 Aug 2024 09:32 )  pH, Arterial: 7.40  pH, Blood: x     /  pCO2: 53    /  pO2: 74    / HCO3: 33    / Base Excess: 6.5   /  SaO2: 96.4                    Urinalysis Basic - ( 05 Aug 2024 04:10 )    Color: x / Appearance: x / SG: x / pH: x  Gluc: 142 mg/dL / Ketone: x  / Bili: x / Urobili: x   Blood: x / Protein: x / Nitrite: x   Leuk Esterase: x / RBC: x / WBC x   Sq Epi: x / Non Sq Epi: x / Bacteria: x          RADIOLOGY & ADDITIONAL TESTS:  CXR:        Care Discussed with Consultants/Other Providers [ x] YES  [ ] NO         INTERVAL HPI/OVERNIGHT EVENTS:   No acute events overnight    Subjective:    ICU Vital Signs Last 24 Hrs  T(C): 37.6 (05 Aug 2024 08:00), Max: 39.1 (04 Aug 2024 16:00)  T(F): 99.7 (05 Aug 2024 08:00), Max: 102.3 (04 Aug 2024 16:00)  HR: 120 (05 Aug 2024 10:00) (93 - 120)  BP: 78/51 (05 Aug 2024 10:00) (78/51 - 153/109)  BP(mean): 60 (05 Aug 2024 10:00) (60 - 117)  ABP: 80/58 (05 Aug 2024 10:00) (77/52 - 131/78)  ABP(mean): 68 (05 Aug 2024 10:00) (63 - 104)  RR: 36 (05 Aug 2024 10:00) (21 - 41)  SpO2: 90% (05 Aug 2024 10:00) (87% - 99%)    O2 Parameters below as of 05 Aug 2024 10:00  Patient On (Oxygen Delivery Method): BiPAP/CPAP          I&O's Summary    04 Aug 2024 07:01  -  05 Aug 2024 07:00  --------------------------------------------------------  IN: 3219.7 mL / OUT: 2901 mL / NET: 318.7 mL    05 Aug 2024 07:01  -  05 Aug 2024 11:06  --------------------------------------------------------  IN: 103.3 mL / OUT: 0 mL / NET: 103.3 mL    MEDICATIONS  (STANDING):  chlorhexidine 2% Cloths 1 Application(s) Topical <User Schedule>  gabapentin 300 milliGRAM(s) Oral two times a day  hydrocortisone sodium succinate Injectable 100 milliGRAM(s) IV Push every 8 hours  levETIRAcetam  IVPB 750 milliGRAM(s) IV Intermittent two times a day  linezolid  IVPB 600 milliGRAM(s) IV Intermittent every 12 hours  meropenem  IVPB 1000 milliGRAM(s) IV Intermittent every 8 hours  midodrine. 10 milliGRAM(s) Oral three times a day  norepinephrine Infusion 0.05 MICROgram(s)/kG/Min (2.2 mL/Hr) IV Continuous <Continuous>  pantoprazole  Injectable 40 milliGRAM(s) IV Push every 12 hours  phenylephrine    Infusion 5 MICROgram(s)/kG/Min (44 mL/Hr) IV Continuous <Continuous>  vasopressin Infusion 0.04 Unit(s)/Min (6 mL/Hr) IV Continuous <Continuous>    MEDICATIONS  (PRN):  acetaminophen     Tablet .. 650 milliGRAM(s) Oral every 6 hours PRN Temp greater or equal to 38C (100.4F)      PHYSICAL EXAM:  GENERAL: distressed  HEENT: NCAT  CHEST/LUNG: B/L air entry  HEART: Regular rate and rhythm  ABDOMEN: Soft, Nondistended, + BS  EXTREMITIES:  warm, no movement  NEURO: no MS    LABS:                        10.0   19.26 )-----------( 103      ( 05 Aug 2024 04:10 )             31.2     08-05    136  |  93<L>  |  17  ----------------------------<  142<H>  2.9<L>   |  30  |  0.5<L>    Ca    8.1<L>      05 Aug 2024 04:10  Mg     2.0     08-05    TPro  5.6<L>  /  Alb  2.5<L>  /  TBili  0.7  /  DBili  x   /  AST  202<H>  /  ALT  189<H>  /  AlkPhos  63  08-05    LIVER FUNCTIONS - ( 05 Aug 2024 04:10 )  Alb: 2.5 g/dL / Pro: 5.6 g/dL / ALK PHOS: 63 U/L / ALT: 189 U/L / AST: 202 U/L / GGT: x             CAPILLARY BLOOD GLUCOSE      POCT Blood Glucose.: 151 mg/dL (05 Aug 2024 05:57)  POCT Blood Glucose.: 156 mg/dL (04 Aug 2024 22:08)  POCT Blood Glucose.: 182 mg/dL (04 Aug 2024 13:06)    ABG - ( 04 Aug 2024 09:32 )  pH, Arterial: 7.40  pH, Blood: x     /  pCO2: 53    /  pO2: 74    / HCO3: 33    / Base Excess: 6.5   /  SaO2: 96.4                    Urinalysis Basic - ( 05 Aug 2024 04:10 )    Color: x / Appearance: x / SG: x / pH: x  Gluc: 142 mg/dL / Ketone: x  / Bili: x / Urobili: x   Blood: x / Protein: x / Nitrite: x   Leuk Esterase: x / RBC: x / WBC x   Sq Epi: x / Non Sq Epi: x / Bacteria: x          RADIOLOGY & ADDITIONAL TESTS:  CXR:        Care Discussed with Consultants/Other Providers [ x] YES  [ ] NO

## 2024-08-05 NOTE — ADVANCED PRACTICE NURSE CONSULT - ASSESSMENT
History of Present Illness:   57 yo F with pmhx of down syndrome, anemia, cerebral palsy, seizure disorder, osteoporosis, hypotension on midodrine, nonverbal at baseline who was BIBEMS from group home for  respiratory distress with complaints of fever and hypoxia that began earlier in the evening. Pt was recently admitted (June 26-July 10) for AHFH due to suspected recurrent aspiration pna, had severe sepsis on admission, was treated by infectious disease with zyvox and meropenum. En route patient was placed on NRB at 15lpm with saturations in the 70's. Upon arrival to ED, pt was placed on HFNC and desatted to 50s, switched to BIPAP. Given high risk of recurrent aspiration, pt was suctioned and  switched over back to HFNC now satting 98%. Pt became hypotensive MAP 57, started on Phenylephrine in the ED. s/p 2L IVF, Cefepime and Vanc in ED. Admitted for Sepsis POA and AHRF 2/2 Aspiration PNA     Patient received lying in bed. Lethargic. On BIPAP. Limited mobility. Incontinent of urine and stool. On pressors. Low blood pressure injury. High risk for pressure injury development and progression.    Multiple wounds to bilateral feet. Seen during previous admissions by wound care.     Type of Wound: Chronic Ulcer  Location: Left medial heel  Tunneling/ Undermining: No  Wound bed: Dark pink/white  Wound edges: Intact  Periwound: Intact  Wound exudate: None  Wound odor: No  Induration, erythema, warmth: No  Wound pain: No    Wound #2  Ulceration to left lateral malleolus. Pink. No exudate, no odor.     Wound #3  Dried healing wound to left bunion. Light brown dry, no exudate, no odor.     Wound #4  Chronic ulceration to right heel. Pink/yellow. No exudate, no odor.     Wound #5  Vascular skin changes versus DTI?? Right medial foot and right foot 2nd toe. Dry hyperpigmented tissue.

## 2024-08-05 NOTE — PROGRESS NOTE ADULT - ASSESSMENT
Impression    # Septic Shock  # Sinus tachycardia  # Acute hypoxemic respiratory failure  # Acute aspiration pneumonia  # Iatrogenic R PTX    Plan:      CNS: No sedation; AEDs; On BIPAP. MS is poor to begin with.     HEENT: Oral care.     CARDIOVASCULAR: Wean off Ming; continue Vasopressin. Lactate remains elevated.     PULMONARY: Chest tube to suction, no air leak. BIPAP with 100%Fio2. ABG stat. DNI.     GASTROINTESTINAL: NPO for now while on BIPAP.     GENITOURINARY/RENAL: Fluid balance equal. Bicarb is normal.     INFECTIOUS: Pan cultures: ID eval. Meropenem and Linezolid.     HEMATOLOGIC: DVT ppx: Lovenox SQ    ENDOCRINE: Follow up FS.  Insulin protocol as needed.  BG goal 140-180    CODE STATUS: DNR/DNI; pall care eval; discussion for CMO  DISPO: MICU care; poor prognosis   LINES: R-IJ TLC, R chest tube to suction; A line; no cervantes

## 2024-08-05 NOTE — PROGRESS NOTE ADULT - SUBJECTIVE AND OBJECTIVE BOX
GENERAL SURGERY PROGRESS NOTE    Patient: MARINO SOSA , 59y ([birth year])Female   MRN: [id number]  Location: [address]  Visit: 08-03-24 Inpatient  Date: 08-05-24 @ 00:17    Hospital Day #: 2    Procedure/Dx/Injuries:  Sepsis due to pneumonia s/p right sided 14FR pigtail.    Events of past 24 hours: Chest tube remains to suction, with a serous output, no airleak. Continues to be supported by BiPAP 16/8since very early in the morning yesterday. Patient is a code sepsis on hemodyanmic support with levofed, phenylephrine and vasopressin. Being monitored by ID- continue on meropenem, linezolid. Palliative care on follow-up for DNR/DNI.    PAST MEDICAL & SURGICAL HISTORY:  Down syndrome      No significant past surgical history          Vitals:   T(F): 101.2 (08-04-24 @ 19:00), Max: 102.7 (08-04-24 @ 01:00)  HR: 117 (08-04-24 @ 23:00)  BP: 135/84 (08-04-24 @ 23:00)  RR: 41 (08-04-24 @ 23:00)  SpO2: 95% (08-04-24 @ 23:00)      Diet, NPO:   Except Medications      Fluids:     I & O's:    08-03-24 @ 07:01  -  08-04-24 @ 07:00  --------------------------------------------------------  IN:    Enteral Tube Flush: 105 mL    Esmolol: 303.9 mL    IV PiggyBack: 1300 mL    Lactated Ringers: 550 mL    Norepinephrine: 796.5 mL    Phenylephrine: 531 mL    Phenylephrine: 1501.1 mL    PRBCs (Packed Red Blood Cells): 313 mL    Sodium Bicarbonate: 2700 mL    Vasopressin: 120 mL  Total IN: 8220.5 mL    OUT:    Chest Tube (mL): 124 mL    Emesis (mL): 100 mL    Incontinent per Collection Bag (mL): 4850 mL  Total OUT: 5074 mL    Total NET: 3146.5 mL    PHYSICAL EXAMINATION  General Appearance: On BiPAP, not awake.  Heart: RRR  Lungs: Increased work of breathing. CT to SXN.   Abdomen: Soft, nontender, nondistended. PEG tube in place.      MEDICATIONS  (STANDING):  chlorhexidine 2% Cloths 1 Application(s) Topical <User Schedule>  gabapentin 300 milliGRAM(s) Oral two times a day  hydrocortisone sodium succinate Injectable 100 milliGRAM(s) IV Push every 8 hours  levETIRAcetam  IVPB 750 milliGRAM(s) IV Intermittent two times a day  linezolid  IVPB 600 milliGRAM(s) IV Intermittent every 12 hours  meropenem  IVPB 1000 milliGRAM(s) IV Intermittent every 8 hours  midodrine. 10 milliGRAM(s) Oral three times a day  norepinephrine Infusion 0.05 MICROgram(s)/kG/Min (2.2 mL/Hr) IV Continuous <Continuous>  pantoprazole  Injectable 40 milliGRAM(s) IV Push every 12 hours  phenylephrine    Infusion 5 MICROgram(s)/kG/Min (44 mL/Hr) IV Continuous <Continuous>  vasopressin Infusion 0.04 Unit(s)/Min (6 mL/Hr) IV Continuous <Continuous>    MEDICATIONS  (PRN):  acetaminophen     Tablet .. 650 milliGRAM(s) Oral every 6 hours PRN Temp greater or equal to 38C (100.4F)      DVT PROPHYLAXIS:   GI PROPHYLAXIS: pantoprazole  Injectable 40 milliGRAM(s) IV Push every 12 hours    ANTICOAGULATION:   ANTIBIOTICS:  linezolid  IVPB 600 milliGRAM(s)  meropenem  IVPB 1000 milliGRAM(s)            LAB/STUDIES:  Labs:  CAPILLARY BLOOD GLUCOSE      POCT Blood Glucose.: 156 mg/dL (04 Aug 2024 22:08)  POCT Blood Glucose.: 182 mg/dL (04 Aug 2024 13:06)                          10.1   22.14 )-----------( 166      ( 04 Aug 2024 04:35 )             31.6       Auto Neutrophil %: 86.2 % (08-04-24 @ 04:35)  Auto Immature Granulocyte %: 9.6 % (08-04-24 @ 04:35)    08-04    143  |  98  |  19  ----------------------------<  198<H>  3.7   |  24  |  1.0      Calcium: 7.7 mg/dL (08-04-24 @ 04:35)      LFTs:             4.9  | 0.6  | 138      ------------------[49      ( 04 Aug 2024 04:35 )  2.4  | x    | 76          Lipase:x      Amylase:x         Blood Gas Arterial, Lactate: 7.0 mmol/L (08-04-24 @ 09:32)  Blood Gas Arterial, Lactate: 8.8 mmol/L (08-03-24 @ 12:09)  Lactate, Blood: 8.4 mmol/L (08-03-24 @ 11:35)  Blood Gas Arterial, Lactate: 8.4 mmol/L (08-03-24 @ 11:23)  Lactate, Blood: 7.9 mmol/L (08-03-24 @ 09:03)  Blood Gas Venous - Lactate: 7.4 mmol/L (08-03-24 @ 09:03)  Blood Gas Arterial, Lactate: 7.2 mmol/L (08-03-24 @ 08:53)  Blood Gas Venous - Lactate: 5.1 mmol/L (08-03-24 @ 02:41)  Blood Gas Venous - Lactate: 3.9 mmol/L (08-03-24 @ 00:48)  Lactate, Blood: 3.0 mmol/L (08-03-24 @ 00:32)    ABG - ( 04 Aug 2024 09:32 )  pH: 7.40  /  pCO2: 53    /  pO2: 74    / HCO3: 33    / Base Excess: 6.5   /  SaO2: 96.4            ABG - ( 03 Aug 2024 12:09 )  pH: 7.17  /  pCO2: 54    /  pO2: 55    / HCO3: 20    / Base Excess: -9.2  /  SaO2: 84.3            ABG - ( 03 Aug 2024 11:23 )  pH: 7.20  /  pCO2: 52    /  pO2: 66    / HCO3: 20    / Base Excess: -8.0  /  SaO2: 91.6              Coags:     12.50  ----< 1.10    ( 03 Aug 2024 00:32 )     26.8                Urinalysis Basic - ( 04 Aug 2024 04:35 )    Color: x / Appearance: x / SG: x / pH: x  Gluc: 198 mg/dL / Ketone: x  / Bili: x / Urobili: x   Blood: x / Protein: x / Nitrite: x   Leuk Esterase: x / RBC: x / WBC x   Sq Epi: x / Non Sq Epi: x / Bacteria: x        Urinalysis with Rflx Culture (collected 03 Aug 2024 08:42)    Culture - Blood (collected 03 Aug 2024 00:32)  Source: .Blood Blood-Peripheral  Preliminary Report (04 Aug 2024 09:02):    No growth at 24 hours    Culture - Blood (collected 03 Aug 2024 00:32)  Source: .Blood Blood-Peripheral  Preliminary Report (04 Aug 2024 09:02):    No growth at 24 hour

## 2024-08-05 NOTE — CONSULT NOTE ADULT - CONVERSATION DETAILS
Attempted to reach [facility] twice, did not receive callback from patient's representative. However, on multiple prior hospitalizations, they have advocated for CMO, and similar wishes conveyed to admitting hospitalist per my discussion with him on 8/3/24. Given patient's clinical decline, per my discussion with ICU attending, with futility of further treatments, decision was made by ICU attending and palliative attending to advocate for no escalation of care. MOLST checklist completed, d/w MH, who had no objection. d/w ICU fellow, SHILPIST completed.     > 16 mins spent on above

## 2024-08-05 NOTE — CONSULT NOTE ADULT - SUBJECTIVE AND OBJECTIVE BOX
HPI: 58F with PMH of Down syndrome, anemia, CP, seizures disorder, hypotension, nonverbal at baseline, many admissions for AHRF from aspiraiton pneumonia and sepsis, here with AHRF, likely aspiration, requiring NIV, in septic shock, on three pressors in ICU. Patient is DNR/DNI from prior admission, with MOLST checklist completed. Palliative called for GOC.    PERTINENT PM/SXH:   Down syndrome      No significant past surgical history      FAMILY HISTORY:  No pertinent family history in first degree relatives    no pertinent family history      SOCIAL HISTORY:   Significant other/partner[ ]  Children[ ]  Rastafarian/Spirituality:  Substance hx:  [ ]   Tobacco hx:  [ ]   Alcohol hx: [ ]   Living Situation: [ ]Home  [ ]Long term care  [ ]Rehab [X ]Other GH ([facility])  Home Services: [ ] HHA [ ] Jennifer RN [ ] Hospice  Occupation:  Home Opioid hx:  [ ] Y [ ] N [ ] I-Stop Reference No:    ADVANCE DIRECTIVES:    [ ] Full Code [s ] DNR  MOLST  [S ]  Living Will  [ ]   DECISION MAKER(s):  [ ] Health Care Proxy(s)  [ ] Surrogate(s)  [ ] Guardian           Name(s): Phone Number(s): [place] [facility]    BASELINE (I)ADL(s) (prior to admission):  [place]: [ ]Total  [ ] Moderate [ ]Dependent  Palliative Performance Status Version 2:         %    http://npcrc.org/files/news/palliative_performance_scale_ppsv2.pdf    Allergies    No Known Allergies    Intolerances    MEDICATIONS  (STANDING):  acetaminophen   IVPB .. 1000 milliGRAM(s) IV Intermittent once  chlorhexidine 2% Cloths 1 Application(s) Topical <User Schedule>  dexMEDEtomidine Infusion 0.3 MICROgram(s)/kG/Hr (3.53 mL/Hr) IV Continuous <Continuous>  gabapentin 300 milliGRAM(s) Oral two times a day  hydrocortisone sodium succinate Injectable 100 milliGRAM(s) IV Push every 8 hours  levETIRAcetam  IVPB 750 milliGRAM(s) IV Intermittent two times a day  linezolid  IVPB 600 milliGRAM(s) IV Intermittent every 12 hours  meropenem  IVPB 1000 milliGRAM(s) IV Intermittent every 8 hours  midodrine. 10 milliGRAM(s) Oral three times a day  norepinephrine Infusion 0.05 MICROgram(s)/kG/Min (2.2 mL/Hr) IV Continuous <Continuous>  pantoprazole  Injectable 40 milliGRAM(s) IV Push every 12 hours  phenylephrine    Infusion 5 MICROgram(s)/kG/Min (44 mL/Hr) IV Continuous <Continuous>  vasopressin Infusion 0.04 Unit(s)/Min (6 mL/Hr) IV Continuous <Continuous>    MEDICATIONS  (PRN):      PRESENT SYMPTOMS: [X ]Unable to obtain due to poor mentation   Source if other than patient:  [ ]Family   [ ]Team   [ ]All review of systems negative including pain and dyspnea unless noted below    All components of pain assessment below addressed. Blank spaces indicate that the patient did/could not complete the assessment.  Pain: [ ]yes [ ]no  QOL impact -   Location -                    Aggravating factors -  Quality -  Radiation -  Timing-  Severity (0-10 scale):  Minimal acceptable level (0-10 scale):     CPOT:    https://www.[facility].org/getattachment/[email or web address]/Critical-Care-Pain-Observation-Tool-(CPOT)    PAIN AD Score: 0  http://geriatrictoolkit.[facility]/cog/painad.pdf (press ctrl +  left click to view)    Dyspnea:                           [ ]None[ ]Mild [ ]Moderate [ ]Severe     Respiratory Distress Observation Scale (RDOS): 2  A score of 0 to 2 signifies little or no respiratory distress, 3 signifies mild distress, scores 4 to 6 indicate moderate distress, and scores greater than 7 signify severe distress  https://www.[facility].ca/sites/default/files/PDFS/736832-wvptkmuzysh-xnweywyf-lgaybsjzgdk-nlycd.pdf    Anxiety:                             [ ]None[ ]Mild [ ]Moderate [ ]Severe   Fatigue:                             [ ]None[ ]Mild [ ]Moderate [ ]Severe   Nausea:                             [ ]None[ ]Mild [ ]Moderate [ ]Severe   Loss of appetite:              [ ]None[ ]Mild [ ]Moderate [ ]Severe   Constipation:                    [ ]None[ ]Mild [ ]Moderate [ ]Severe    Other Symptoms:      Palliative Performance Status Version 2:         %    http://[facility].org/files/news/palliative_performance_scale_ppsv2.pdf  PHYSICAL EXAM:  Vital Signs Last 24 Hrs  T(C): 38.4 (05 Aug 2024 18:00), Max: 38.4 (05 Aug 2024 17:20)  T(F): 101.2 (05 Aug 2024 18:00), Max: 101.2 (05 Aug 2024 18:00)  HR: 156 (05 Aug 2024 19:53) (96 - 156)  BP: 106/58 (05 Aug 2024 18:00) (76/49 - 153/109)  BP(mean): 76 (05 Aug 2024 18:00) (57 - 117)  RR: 28 (05 Aug 2024 18:00) (12 - 60)  SpO2: 94% (05 Aug 2024 19:53) (57% - 99%)    Parameters below as of 05 Aug 2024 18:00  Patient On (Oxygen Delivery Method): BiPAP/CPAP     I&O's Summary    04 Aug 2024 07:01  -  05 Aug 2024 07:00  --------------------------------------------------------  IN: 3219.7 mL / OUT: 2901 mL / NET: 318.7 mL    05 Aug 2024 07:01  -  05 Aug 2024 22:52  --------------------------------------------------------  IN: 2002.5 mL / OUT: 435 mL / NET: 1567.5 mL        GENERAL:  [X ] No acute distress [ ]Lethargic  [ ]Unarousable  [ ]Verbal  [ ]Non-Verbal [ ]Cachexia    BEHAVIORAL/PSYCH:  [ ]Alert and Oriented x  [ ] Anxiety [ ] Delirium [ ] Agitation [X ] Calm   EYES: [ ] No scleral icterus [ ] Scleral icterus [X ] Closed  ENMT:  [ ]Dry mouth  [ ]No external oral lesions [ X] No external ear or nose lesions  CARDIOVASCULAR:  [ ]Regular [ ]Irregular [ ]Tachy [ ]Not Tachy  [ ]Kali [ ] Edema [ ] No edema  PULMONARY:  [ ]Tachypnea  [ ]Audible excessive secretions [X ] No labored breathing [ ] labored breathing  GASTROINTESTINAL: [ ]Soft  [ ]Distended  [ X]Not distended [ ]Non tender [ ]Tender  MUSCULOSKELETAL: [ ]No clubbing [ ] clubbing  [ X] No cyanosis [ ] cyanosis  NEUROLOGIC: [ ]No focal deficits  [ ]Follows commands  [ ]Does not follow commands  [ ]Cognitive impairment  [ ]Dysphagia  [ ]Dysarthria  [ ]Paresis   SKIN: [ ] Jaundiced [X ] Non-jaundiced [ ]Rash [ ]No Rash [ ] Warm [ ] Dry  MISC/LINES: [ ] ET tube [ ] Trach [ ]NGT/OGT [ ]PEG [ ]De Jesus    CRITICAL CARE:  [ ] Shock Present  [ ]Septic [ ]Cardiogenic [ ]Neurologic [ ]Hypovolemic  [ ]  Vasopressors [ ]  Inotropes   [ ]Respiratory failure present [ ]Mechanical ventilation [ ]Non-invasive ventilatory support [ ]High flow  [ ]Acute  [ ]Chronic [ ]Hypoxic  [ ]Hypercarbic [ ]Other  [ ]Other organ failure     LABS: reviewed by me, notable for: leukocytosis                        10.0   19.26 )-----------( 103      ( 05 Aug 2024 04:10 )             31.2   08-05    x   |  x   |  x   ----------------------------<  x   4.8   |  x   |  x     Ca    8.1<L>      05 Aug 2024 04:10  Mg     2.0     08-05    TPro  5.6<L>  /  Alb  2.5<L>  /  TBili  0.7  /  DBili  x   /  AST  202<H>  /  ALT  189<H>  /  AlkPhos  63  08-05      Urinalysis Basic - ( 05 Aug 2024 04:10 )    Color: x / Appearance: x / SG: x / pH: x  Gluc: 142 mg/dL / Ketone: x  / Bili: x / Urobili: x   Blood: x / Protein: x / Nitrite: x   Leuk Esterase: x / RBC: x / WBC x   Sq Epi: x / Non Sq Epi: x / Bacteria: x      RADIOLOGY & ADDITIONAL STUDIES: CXR personally reviewed by me: bilateral opacities    PROTEIN CALORIE MALNUTRITION PRESENT: [ ]mild [ ]moderate [ ]severe [ ]underweight [ ]morbid obesity  https://www.andeal.org/vault/8753/web/files/ONC/Table_Clinical%20Characteristics%20to%20Document%20Malnutrition-White%20JV%20et%20al%202012.pdf    Height (cm): 142.2 (08-03-24 @ 12:30)  Weight (kg): 47.1 (08-03-24 @ 12:30)  BMI (kg/m2): 23.3 (08-03-24 @ 12:30)    [ ]PPSV2 < or = to 30% [ ]significant weight loss  [ ]poor nutritional intake  [ ]anasarca      [ ]Artificial Nutrition          Palliative Care Spiritual/Emotional Screening Tool Question  Severity (0-4):                    OR                    [X ] Unable to determine/NA  Score of 2 or greater indicates recommendation of Chaplaincy referral  Chaplaincy Referral: [ ] Yes [ ] Refused [ ] Following     Caregiver [place]:  [ ] Yes [ ] No    OR    [x ] Unable to determine. Will assess at later time if appropriate.  Social Work Referral [ ]  Patient and Family Centered Care Referral [ ]    Anticipatory Grief Present: [ ] Yes [ ] No    OR     [ x] Unable to determine. Will assess at later time if appropriate.  Social Work Referral [ ]  Patient and Family Centered Care Referral [ ]    REFERRALS:   [ ]Chaplaincy  [ ]Hospice  [ ]Child Life  [ ]Social Work  [ ]Case management [ ]Holistic Therapy     Palliative care education provided to patient and/or family    Goals of Care Document:     ______________  Lei Capone MD  Palliative Medicine  [facility]   of Geriatric and Palliative Medicine  [phone]

## 2024-08-05 NOTE — PROGRESS NOTE ADULT - ASSESSMENT
# Septic Shock  # Sinus tachycardia  # Acute hypoxemic respiratory failure  # Acute aspiration pneumonia  # Iatrogenic R PTX    Plan:      CNS: No sedation; AEDs;   -currently On BIPAP and not awake    HEENT: Oral care.     CARDIOVASCULAR:  -will try to wean off Ming;  -c/w Levophed and phenyelphrne      PULMONARY:   Chest tube to suction, no air leak.   -c/w BIPAP with 100%Fio2.   -ABG this AM improved  - f/u palliative consult   -possible CMO    GASTROINTESTINAL:   -NPO while on BiPAP  -c/w protonix     GENITOURINARY/RENAL:   -currently has  positive fluid balance  -monitor AM Labs     INFECTIOUS:   -F/u blood cultures  -F/u ID recs   -c/w  Meropenem and Linezolid.       CODE STATUS: DNR/DNI;  DISPO: MICU care; poor prognosis          # Septic Shock  # Sinus tachycardia  # Acute hypoxemic respiratory failure  # Acute aspiration pneumonia  # Iatrogenic R PTX    Plan:      CNS: No sedation; AEDs;   - currently On BIPAP and not awake    HEENT: Oral care.     CARDIOVASCULAR:  - will try to wean off Ming;  - c/w Levophed and phenyelphrine  - lactate remains elevated      PULMONARY:   - Chest tube to suction, no air leak.   - c/w BIPAP with 100%Fio2.   - stat ABG  - DNI  - f/u pall care    GASTROINTESTINAL:   -NPO while on BiPAP  -c/w protonix     GENITOURINARY/RENAL:   -currently has positive fluid balance  -monitor AM Labs     INFECTIOUS:   -F/u blood cultures  -F/u ID recs   -c/w Meropenem and Linezolid.       CODE STATUS: DNR/DNI;  DISPO: MICU care; poor prognosis

## 2024-08-06 NOTE — PROGRESS NOTE ADULT - GASTROINTESTINAL
distended/bowel sounds hypoactive
distended/bowel sounds hypoactive/rigidity
soft/distended/bowel sounds hypoactive

## 2024-08-06 NOTE — PROGRESS NOTE ADULT - SUBJECTIVE AND OBJECTIVE BOX
IANMARINO  59y, Female    All available historical data reviewed    OVERNIGHT EVENTS:  fevers  on Bipap  non responsive  on levo  BMs. No diarrhea    ROS:  unable to obtain history secondary to patient's mental status and/or sedation     VITALS:  T(F): 101.5, Max: 102 (08-05-24 @ 20:00)  HR: 144  BP: 99/70  RR: 21Vital Signs Last 24 Hrs  T(C): 38.6 (06 Aug 2024 08:00), Max: 38.9 (05 Aug 2024 20:00)  T(F): 101.5 (06 Aug 2024 08:00), Max: 102 (05 Aug 2024 20:00)  HR: 144 (06 Aug 2024 08:00) (109 - 160)  BP: 99/70 (06 Aug 2024 08:00) (75/49 - 132/58)  BP(mean): 80 (06 Aug 2024 08:00) (56 - 89)  RR: 21 (06 Aug 2024 08:00) (12 - 60)  SpO2: 90% (06 Aug 2024 08:00) (57% - 100%)    Parameters below as of 06 Aug 2024 08:00  Patient On (Oxygen Delivery Method): BiPAP/CPAP        TESTS & MEASUREMENTS:                        10.1   25.86 )-----------( 64       ( 06 Aug 2024 06:08 )             34.0     08-06    141  |  99  |  28<H>  ----------------------------<  52<LL>  3.8   |  19  |  0.7    Ca    8.1<L>      06 Aug 2024 06:08  Mg     2.0     08-06    TPro  5.0<L>  /  Alb  2.3<L>  /  TBili  1.2  /  DBili  x   /  AST  227<H>  /  ALT  319<H>  /  AlkPhos  98  08-06    LIVER FUNCTIONS - ( 06 Aug 2024 06:08 )  Alb: 2.3 g/dL / Pro: 5.0 g/dL / ALK PHOS: 98 U/L / ALT: 319 U/L / AST: 227 U/L / GGT: x             Urinalysis with Rflx Culture (collected 08-03-24 @ 08:42)    Culture - Urine (collected 08-03-24 @ 08:42)  Source: Catheterized None  Preliminary Report (08-05-24 @ 11:49):    >100,000 CFU/ml Gram Negative Rods    Culture - Blood (collected 08-03-24 @ 00:32)  Source: .Blood Blood-Peripheral  Preliminary Report (08-06-24 @ 09:02):    No growth at 72 Hours    Culture - Blood (collected 08-03-24 @ 00:32)  Source: .Blood Blood-Peripheral  Preliminary Report (08-06-24 @ 09:02):    No growth at 72 Hours      Urinalysis Basic - ( 06 Aug 2024 06:08 )    Color: x / Appearance: x / SG: x / pH: x  Gluc: 52 mg/dL / Ketone: x  / Bili: x / Urobili: x   Blood: x / Protein: x / Nitrite: x   Leuk Esterase: x / RBC: x / WBC x   Sq Epi: x / Non Sq Epi: x / Bacteria: x          Social History:  Tobacco Use: No  Alcohol Use: No  Drug Use: No    RADIOLOGY & ADDITIONAL TESTS:  Personal review of radiological diagnostics performed  Echo and EKG results noted when applicable.     MEDICATIONS:  chlorhexidine 2% Cloths 1 Application(s) Topical <User Schedule>  dexMEDEtomidine Infusion 0.3 MICROgram(s)/kG/Hr IV Continuous <Continuous>  gabapentin 300 milliGRAM(s) Oral two times a day  hydrocortisone sodium succinate Injectable 100 milliGRAM(s) IV Push every 8 hours  levETIRAcetam  IVPB 750 milliGRAM(s) IV Intermittent two times a day  linezolid  IVPB 600 milliGRAM(s) IV Intermittent every 12 hours  meropenem  IVPB 1000 milliGRAM(s) IV Intermittent every 8 hours  midodrine. 10 milliGRAM(s) Oral three times a day  norepinephrine Infusion 0.05 MICROgram(s)/kG/Min IV Continuous <Continuous>  pantoprazole  Injectable 40 milliGRAM(s) IV Push every 12 hours  phenylephrine    Infusion 5 MICROgram(s)/kG/Min IV Continuous <Continuous>  vasopressin Infusion 0.04 Unit(s)/Min IV Continuous <Continuous>      ANTIBIOTICS:  linezolid  IVPB 600 milliGRAM(s) IV Intermittent every 12 hours  meropenem  IVPB 1000 milliGRAM(s) IV Intermittent every 8 hours

## 2024-08-06 NOTE — PROGRESS NOTE ADULT - ASSESSMENT
# Septic Shock  # Sinus tachycardia  # Acute hypoxemic respiratory failure  # Acute aspiration pneumonia  # Iatrogenic R PTX    Plan:    CNS: No sedation; AEDs;   - currently On AVAPS, no MS    HEENT: Oral care.     CARDIOVASCULAR:  - on 3 pressors  - still hypotensive  - lactate remains elevated      PULMONARY:   - Chest tube to suction, no air leak.   - c/w AVAPS with 100%Fio2.   - DNI    GASTROINTESTINAL:   - NPO while on BiPAP  - c/w protonix     GENITOURINARY/RENAL:   - currently has positive fluid balance  - monitor AM Labs     INFECTIOUS:   - c/w Meropenem and Linezolid per ID    HEMATOLOGIC:  - DVT ppx- lovenox SQ    ENDOCRINE  - monitor FS. Insulin protocol as needed.       CODE STATUS: DNR/DNI;  DISPO: poor prognosis   LINES: R-IJ TLC, R chest tube to suction; A line; no cervantes

## 2024-08-06 NOTE — PROGRESS NOTE ADULT - SUBJECTIVE AND OBJECTIVE BOX
HPI: 58F with PMH of Down syndrome, anemia, CP, seizures disorder, hypotension, nonverbal at baseline, many admissions for AHRF from aspiraiton pneumonia and sepsis, here with AHRF, likely aspiration, requiring NIV, in septic shock, on three pressors in ICU. Patient is DNR/DNI from prior admission, with MOLST checklist completed. Palliative called for GOC.    INTERVAL EVENTS  8/6: patient declining, actively dying    ADVANCE DIRECTIVES:    [ ] Full Code [X] DNR  MOLST  [X]  Living Will  [ ]   DECISION MAKER(s):  [ ] Health Care Proxy(s)  [ ] Surrogate(s)  [ ] Guardian           Name(s): Phone Number(s): [employer] [facility]    BASELINE (I)ADL(s) (prior to admission):  [place]: [ ]Total  [ ] Moderate [ ]Dependent  Palliative Performance Status Version 2:         %    http://[facility].org/files/news/palliative_performance_scale_[email or web address].pdf    Allergies    No Known Allergies    Intolerances    MEDICATIONS  (STANDING):  chlorhexidine 2% Cloths 1 Application(s) Topical <User Schedule>  dexMEDEtomidine Infusion 0.3 MICROgram(s)/kG/Hr (3.53 mL/Hr) IV Continuous <Continuous>  gabapentin 300 milliGRAM(s) Oral two times a day  hydrocortisone sodium succinate Injectable 100 milliGRAM(s) IV Push every 8 hours  levETIRAcetam  IVPB 750 milliGRAM(s) IV Intermittent two times a day  linezolid  IVPB 600 milliGRAM(s) IV Intermittent every 12 hours  meropenem  IVPB 1000 milliGRAM(s) IV Intermittent every 8 hours  midodrine. 10 milliGRAM(s) Oral three times a day  norepinephrine Infusion 0.05 MICROgram(s)/kG/Min (2.2 mL/Hr) IV Continuous <Continuous>  pantoprazole  Injectable 40 milliGRAM(s) IV Push every 12 hours  phenylephrine    Infusion 5 MICROgram(s)/kG/Min (44 mL/Hr) IV Continuous <Continuous>  vasopressin Infusion 0.04 Unit(s)/Min (6 mL/Hr) IV Continuous <Continuous>    MEDICATIONS  (PRN):      PRESENT SYMPTOMS: [X ]Unable to obtain due to poor mentation   Source if other than patient:  [ ]Family   [ ]Team   [ ]All review of systems negative including pain and dyspnea unless noted below    All components of pain assessment below addressed. Blank spaces indicate that the patient did/could not complete the assessment.  Pain: [ ]yes [ ]no  QOL impact -   Location -                    Aggravating factors -  Quality -  Radiation -  Timing-  Severity (0-10 scale):  Minimal acceptable level (0-10 scale):     CPOT:    https://www.[facility].org/getattachment/[email or web address]/Critical-Care-Pain-Observation-Tool-(CPOT)    PAIN AD Score: 0  http://geriatrictoolkit.[facility]/cog/painad.pdf (press ctrl +  left click to view)    Dyspnea:                           [ ]None[ ]Mild [ ]Moderate [ ]Severe     Respiratory Distress Observation Scale (RDOS): 2  A score of 0 to 2 signifies little or no respiratory distress, 3 signifies mild distress, scores 4 to 6 indicate moderate distress, and scores greater than 7 signify severe distress  https://www.[facility].ca/sites/default/files/PDFS/920332-xmgbpetkzpy-jqhazxnr-adjetgffxmt-nhbwk.pdf    Anxiety:                             [ ]None[ ]Mild [ ]Moderate [ ]Severe   Fatigue:                             [ ]None[ ]Mild [ ]Moderate [ ]Severe   Nausea:                             [ ]None[ ]Mild [ ]Moderate [ ]Severe   Loss of appetite:              [ ]None[ ]Mild [ ]Moderate [ ]Severe   Constipation:                    [ ]None[ ]Mild [ ]Moderate [ ]Severe    Other Symptoms:      Palliative Performance Status Version 2:         %    http://[facility].org/files/news/palliative_performance_scale_ppsv2.pdf  PHYSICAL EXAM:  Vital Signs Last 24 Hrs  T(C): 38.3 (06 Aug 2024 11:00), Max: 38.9 (05 Aug 2024 20:00)  T(F): 100.9 (06 Aug 2024 11:00), Max: 102 (05 Aug 2024 20:00)  HR: 23 (06 Aug 2024 11:24) (23 - 160)  BP: 73/44 (06 Aug 2024 11:00) (73/44 - 132/58)  BP(mean): 52 (06 Aug 2024 11:00) (52 - 89)  RR: 27 (06 Aug 2024 11:00) (21 - 55)  SpO2: 82% (06 Aug 2024 11:24) (75% - 100%)    Parameters below as of 06 Aug 2024 11:00  Patient On (Oxygen Delivery Method): BiPAP/CPAP          GENERAL:  [X ] No acute distress [ ]Lethargic  [ ]Unarousable  [ ]Verbal  [ ]Non-Verbal [ ]Cachexia    BEHAVIORAL/PSYCH:  [ ]Alert and Oriented x  [ ] Anxiety [ ] Delirium [ ] Agitation [ ] Calm   EYES: [ ] No scleral icterus [ ] Scleral icterus [X ] Closed  ENMT:  [ ]Dry mouth  [ ]No external oral lesions [ X] No external ear or nose lesions  CARDIOVASCULAR:  [ ]Regular [ ]Irregular [ ]Tachy [ ]Not Tachy  [ ]Kali [ ] Edema [ ] No edema  PULMONARY:  [ ]Tachypnea  [ ]Audible excessive secretions [X ] No labored breathing [ ] labored breathing  GASTROINTESTINAL: [ ]Soft  [ ]Distended  [ X]Not distended [ ]Non tender [ ]Tender  MUSCULOSKELETAL: [ ]No clubbing [ ] clubbing  [ X] No cyanosis [ ] cyanosis  NEUROLOGIC: [ ]No focal deficits  [ ]Follows commands  [ X]Does not follow commands  [ ]Cognitive impairment  [ ]Dysphagia  [ ]Dysarthria  [ ]Paresis   SKIN: [ ] Jaundiced [X ] Non-jaundiced [ ]Rash [ ]No Rash [ ] Warm [ ] Dry  MISC/LINES: [ ] ET tube [ ] Trach [ ]NGT/OGT [ ]PEG [ ]De Jesus    CRITICAL CARE:  [ ] Shock Present  [ ]Septic [ ]Cardiogenic [ ]Neurologic [ ]Hypovolemic  [ ]  Vasopressors [ ]  Inotropes   [ ]Respiratory failure present [ ]Mechanical ventilation [ ]Non-invasive ventilatory support [ ]High flow  [ ]Acute  [ ]Chronic [ ]Hypoxic  [ ]Hypercarbic [ ]Other  [ ]Other organ failure     LABS: reviewed by me, notable for: leukocytosis                                   10.1   25.86 )-----------( 64       ( 06 Aug 2024 06:08 )             34.0     08-06    141  |  99  |  28<H>  ----------------------------<  52<LL>  3.8   |  19  |  0.7    Ca    8.1<L>      06 Aug 2024 06:08  Mg     2.0     08-06          RADIOLOGY & ADDITIONAL STUDIES: CXR personally reviewed by me: bilateral opacities    PROTEIN CALORIE MALNUTRITION PRESENT: [ ]mild [ ]moderate [ ]severe [ ]underweight [ ]morbid obesity  https://www.andeal.org/vault/2440/web/files/ONC/Table_Clinical%20Characteristics%20to%20Document%20Malnutrition-White%20JV%20et%20al%202012.pdf    Height (cm): 142.2 (08-03-24 @ 12:30)  Weight (kg): 47.1 (08-03-24 @ 12:30)  BMI (kg/m2): 23.3 (08-03-24 @ 12:30)    [ ]PPSV2 < or = to 30% [ ]significant weight loss  [ ]poor nutritional intake  [ ]anasarca      [ ]Artificial Nutrition          Palliative Care Spiritual/Emotional Screening Tool Question  Severity (0-4):                    OR                    [X ] Unable to determine/NA  Score of 2 or greater indicates recommendation of Chaplaincy referral  Chaplaincy Referral: [ ] Yes [ ] Refused [ ] Following     Caregiver [place]:  [ ] Yes [ ] No    OR    [x ] Unable to determine. Will assess at later time if appropriate.  Social Work Referral [ ]  Patient and Family Centered Care Referral [ ]    Anticipatory Grief Present: [ ] Yes [ ] No    OR     [ x] Unable to determine. Will assess at later time if appropriate.  Social Work Referral [ ]  Patient and Family Centered Care Referral [ ]    REFERRALS:   [ ]Chaplaincy  [ ]Hospice  [ ]Child Life  [ ]Social Work  [ ]Case management [ ]Holistic Therapy     Palliative care education provided to patient and/or family    Goals of Care Document:     ______________  Lei Capone MD  Palliative Medicine  [facility]   of Geriatric and Palliative Medicine  [phone]

## 2024-08-06 NOTE — PROGRESS NOTE ADULT - SUBJECTIVE AND OBJECTIVE BOX
Patient is a 59y old  Female who presents with a chief complaint of AHRF (05 Aug 2024 22:50)        Over Night Events:    Overall worse   On multiple pressors   DNR DNI no escalation signed   Infiltrate worse on the CXR         ROS:  See HPI    PHYSICAL EXAM    ICU Vital Signs Last 24 Hrs  T(C): 38.9 (06 Aug 2024 03:30), Max: 38.9 (05 Aug 2024 20:00)  T(F): 102 (06 Aug 2024 03:30), Max: 102 (05 Aug 2024 20:00)  HR: 144 (06 Aug 2024 07:54) (107 - 160)  BP: 96/49 (06 Aug 2024 07:00) (75/49 - 132/58)  BP(mean): 71 (06 Aug 2024 07:00) (56 - 89)  ABP: 84/75 (05 Aug 2024 13:55) (67/55 - 95/68)  ABP(mean): 80 (05 Aug 2024 13:55) (60 - 84)  RR: 21 (06 Aug 2024 07:00) (12 - 60)  SpO2: 99% (06 Aug 2024 07:54) (57% - 100%)    O2 Parameters below as of 05 Aug 2024 18:00  Patient On (Oxygen Delivery Method): BiPAP/CPAP            General: distress  HEENT: DANNY             Lymphatic system: No cervical LN   Lungs: Bilateral BS, coarse, BIPAP dependent   Cardiovascular: Regular   Gastrointestinal: Soft, Positive BS  Extremities: No clubbing. Edema.   Skin: Warm, intact  Neurological: MS absent     08-05-24 @ 07:01  -  08-06-24 @ 07:00  --------------------------------------------------------  IN:    Dexmedetomidine: 108.1 mL    Enteral Tube Flush: 220 mL    IV PiggyBack: 950 mL    Norepinephrine: 657.3 mL    Phenylephrine: 1052.2 mL    Vasopressin: 126 mL  Total IN: 3113.6 mL    OUT:    Chest Tube (mL): 75 mL    Incontinent per Collection Bag (mL): 650 mL  Total OUT: 725 mL    Total NET: 2388.6 mL      08-06-24 @ 07:01 - 08-06-24 @ 08:14  --------------------------------------------------------  IN:  Total IN: 0 mL    OUT:    Incontinent per Collection Bag (mL): 200 mL  Total OUT: 200 mL    Total NET: -200 mL          LABS:                            10.1   25.86 )-----------( 64       ( 06 Aug 2024 06:08 )             34.0                                               08-06    141  |  99  |  28<H>  ----------------------------<  52<LL>  3.8   |  19  |  0.7    Ca    8.1<L>      06 Aug 2024 06:08  Mg     2.0     08-06    TPro  5.0<L>  /  Alb  2.3<L>  /  TBili  1.2  /  DBili  x   /  AST  227<H>  /  ALT  319<H>  /  AlkPhos  98  08-06                                             Urinalysis Basic - ( 06 Aug 2024 06:08 )    Color: x / Appearance: x / SG: x / pH: x  Gluc: 52 mg/dL / Ketone: x  / Bili: x / Urobili: x   Blood: x / Protein: x / Nitrite: x   Leuk Esterase: x / RBC: x / WBC x   Sq Epi: x / Non Sq Epi: x / Bacteria: x                                                  LIVER FUNCTIONS - ( 06 Aug 2024 06:08 )  Alb: 2.3 g/dL / Pro: 5.0 g/dL / ALK PHOS: 98 U/L / ALT: 319 U/L / AST: 227 U/L / GGT: x                                                  Urinalysis with Rflx Culture (collected 03 Aug 2024 08:42)    Culture - Urine (collected 03 Aug 2024 08:42)  Source: Catheterized None  Preliminary Report (05 Aug 2024 11:49):    >100,000 CFU/ml Gram Negative Rods                                                                                       ABG - ( 05 Aug 2024 15:32 )  pH, Arterial: 7.21  pH, Blood: x     /  pCO2: 73    /  pO2: 78    / HCO3: 29    / Base Excess: -0.6  /  SaO2: 95.8                MEDICATIONS  (STANDING):  chlorhexidine 2% Cloths 1 Application(s) Topical <User Schedule>  dexMEDEtomidine Infusion 0.3 MICROgram(s)/kG/Hr (3.53 mL/Hr) IV Continuous <Continuous>  dextrose 50% Injectable 12.5 Gram(s) IV Push once  gabapentin 300 milliGRAM(s) Oral two times a day  hydrocortisone sodium succinate Injectable 100 milliGRAM(s) IV Push every 8 hours  levETIRAcetam  IVPB 750 milliGRAM(s) IV Intermittent two times a day  linezolid  IVPB 600 milliGRAM(s) IV Intermittent every 12 hours  meropenem  IVPB 1000 milliGRAM(s) IV Intermittent every 8 hours  midodrine. 10 milliGRAM(s) Oral three times a day  norepinephrine Infusion 0.05 MICROgram(s)/kG/Min (2.2 mL/Hr) IV Continuous <Continuous>  pantoprazole  Injectable 40 milliGRAM(s) IV Push every 12 hours  phenylephrine    Infusion 5 MICROgram(s)/kG/Min (44 mL/Hr) IV Continuous <Continuous>  vasopressin Infusion 0.04 Unit(s)/Min (6 mL/Hr) IV Continuous <Continuous>    MEDICATIONS  (PRN):      Xrays:                                                                                     ECHO

## 2024-08-06 NOTE — PROGRESS NOTE ADULT - SUBJECTIVE AND OBJECTIVE BOX
GENERAL SURGERY PROGRESS NOTE    Patient: MARINO SOSA , 59y ([birth year])Female   MRN: [id number]  Location: [address]  Visit: 08-03-24 Inpatient  Date: 08-06-24 @ 08:37    Hospital Day #:  Post-Op Day #:    Procedure/Dx/Injuries:    Events of past 24 hours:    PAST MEDICAL & SURGICAL HISTORY:  Down syndrome      No significant past surgical history          Vitals:   T(F): 101.5 (08-06-24 @ 08:00), Max: 102 (08-05-24 @ 20:00)  HR: 144 (08-06-24 @ 08:00)  BP: 99/70 (08-06-24 @ 08:00)  RR: 21 (08-06-24 @ 08:00)  SpO2: 90% (08-06-24 @ 08:00)      Diet, NPO:   Except Medications      Fluids:     I & O's:    08-05-24 @ 07:01  -  08-06-24 @ 07:00  --------------------------------------------------------  IN:    Dexmedetomidine: 108.1 mL    Enteral Tube Flush: 220 mL    IV PiggyBack: 950 mL    Norepinephrine: 657.3 mL    Phenylephrine: 1052.2 mL    Vasopressin: 126 mL  Total IN: 3113.6 mL    OUT:    Chest Tube (mL): 75 mL    Incontinent per Collection Bag (mL): 650 mL  Total OUT: 725 mL    Total NET: 2388.6 mL        Bowel Movement: : [] YES [] NO  Flatus: : [] YES [] NO    PHYSICAL EXAM:  General: NAD, AAOx3, calm and cooperative  HEENT: NCAT, DANNY, EOMI, Trachea ML, Neck supple  Cardiac: RRR S1, S2, no Murmurs, rubs or gallops  Respiratory: CTAB, normal respiratory effort, breath sounds equal BL, no wheeze, rhonchi or crackles  Abdomen: Soft, non-distended, non-tender, no rebound, no guarding. +BS.  Rectal: Good tone, +stool, no blood, no shar-anal masses/lesions, no fistulas, fissures, hemorrhoids  Musculoskeletal: Strength 5/5 BL UE/LE, ROM intact, compartments soft  Neuro: Sensation grossly intact and equal throughout, no focal deficits  Vascular: Pulses 2+ throughout, extremities well perfused  Skin: Warm/dry, normal color, no jaundice  Incision/wound: healing well, dressings in place, clean, dry and intact    MEDICATIONS  (STANDING):  chlorhexidine 2% Cloths 1 Application(s) Topical <User Schedule>  dexMEDEtomidine Infusion 0.3 MICROgram(s)/kG/Hr (3.53 mL/Hr) IV Continuous <Continuous>  gabapentin 300 milliGRAM(s) Oral two times a day  hydrocortisone sodium succinate Injectable 100 milliGRAM(s) IV Push every 8 hours  levETIRAcetam  IVPB 750 milliGRAM(s) IV Intermittent two times a day  linezolid  IVPB 600 milliGRAM(s) IV Intermittent every 12 hours  meropenem  IVPB 1000 milliGRAM(s) IV Intermittent every 8 hours  midodrine. 10 milliGRAM(s) Oral three times a day  norepinephrine Infusion 0.05 MICROgram(s)/kG/Min (2.2 mL/Hr) IV Continuous <Continuous>  pantoprazole  Injectable 40 milliGRAM(s) IV Push every 12 hours  phenylephrine    Infusion 5 MICROgram(s)/kG/Min (44 mL/Hr) IV Continuous <Continuous>  vasopressin Infusion 0.04 Unit(s)/Min (6 mL/Hr) IV Continuous <Continuous>    MEDICATIONS  (PRN):      DVT PROPHYLAXIS:   GI PROPHYLAXIS: pantoprazole  Injectable 40 milliGRAM(s) IV Push every 12 hours    ANTICOAGULATION:   ANTIBIOTICS:  linezolid  IVPB 600 milliGRAM(s)  meropenem  IVPB 1000 milliGRAM(s)            LAB/STUDIES:  Labs:  CAPILLARY BLOOD GLUCOSE      POCT Blood Glucose.: 80 mg/dL (06 Aug 2024 08:17)  POCT Blood Glucose.: 121 mg/dL (05 Aug 2024 15:21)                          10.1   25.86 )-----------( 64       ( 06 Aug 2024 06:08 )             34.0       Auto Neutrophil %: 90.2 % (08-06-24 @ 06:08)  Auto Immature Granulocyte %: 4.2 % (08-06-24 @ 06:08)    08-06    141  |  99  |  28<H>  ----------------------------<  52<LL>  3.8   |  19  |  0.7      Calcium: 8.1 mg/dL (08-06-24 @ 06:08)      LFTs:             5.0  | 1.2  | 227      ------------------[98      ( 06 Aug 2024 06:08 )  2.3  | x    | 319         Lipase:x      Amylase:x         Blood Gas Arterial, Lactate: 5.8 mmol/L (08-05-24 @ 15:32)  Blood Gas Arterial, Lactate: 7.0 mmol/L (08-04-24 @ 09:32)  Blood Gas Arterial, Lactate: 8.8 mmol/L (08-03-24 @ 12:09)  Lactate, Blood: 8.4 mmol/L (08-03-24 @ 11:35)  Blood Gas Arterial, Lactate: 8.4 mmol/L (08-03-24 @ 11:23)  Lactate, Blood: 7.9 mmol/L (08-03-24 @ 09:03)  Blood Gas Venous - Lactate: 7.4 mmol/L (08-03-24 @ 09:03)  Blood Gas Arterial, Lactate: 7.2 mmol/L (08-03-24 @ 08:53)    ABG - ( 05 Aug 2024 15:32 )  pH: 7.21  /  pCO2: 73    /  pO2: 78    / HCO3: 29    / Base Excess: -0.6  /  SaO2: 95.8            ABG - ( 04 Aug 2024 09:32 )  pH: 7.40  /  pCO2: 53    /  pO2: 74    / HCO3: 33    / Base Excess: 6.5   /  SaO2: 96.4            ABG - ( 03 Aug 2024 12:09 )  pH: 7.17  /  pCO2: 54    /  pO2: 55    / HCO3: 20    / Base Excess: -9.2  /  SaO2: 84.3              Coags:            Urinalysis Basic - ( 06 Aug 2024 06:08 )    Color: x / Appearance: x / SG: x / pH: x  Gluc: 52 mg/dL / Ketone: x  / Bili: x / Urobili: x   Blood: x / Protein: x / Nitrite: x   Leuk Esterase: x / RBC: x / WBC x   Sq Epi: x / Non Sq Epi: x / Bacteria: x        Urinalysis with Rflx Culture (collected 03 Aug 2024 08:42)    Culture - Urine (collected 03 Aug 2024 08:42)  Source: Catheterized None  Preliminary Report (05 Aug 2024 11:49):    >100,000 CFU/ml Gram Negative Rods              IMAGING:      ACCESS/ DEVICES:  [ ] Peripheral IV  [ ] Central Venous Line	[ ] R	[ ] L	[ ] IJ	[ ] Fem	[ ] SC	Placed:   [ ] Arterial Line		[ ] R	[ ] L	[ ] Fem	[ ] Rad	[ ] Ax	Placed:   [ ] PICC:					[ ] Mediport  [ ] Urinary Catheter,  Date Placed:   [ ] Chest tube: [ ] Right, [ ] Left  [ ] GARRETT/Thomas Drains      GREEN TEAM SPECTRA: [id number]     GENERAL SURGERY PROGRESS NOTE    Patient: MARINO SOSA , 59y ([birth year])Female   MRN: [id number]  Location: [address]  Visit: 08-03-24 Inpatient  Date: 08-06-24 @ 08:37    Hospital Day #: 4      Procedure/Dx/Injuries: right sided apical pneumothorax s/p central line placement. now s/p right 14fr pigtail.     Events of past 24 hours: No acute overnight events. Chest tube placed on waterseal at midnight, negative for air leaks 40cc serosanguinous output overnight from the chest tube. AM CXR stable. Pt remains on BIPAP and remains on 3 pressors.     PAST MEDICAL & SURGICAL HISTORY:  Down syndrome      No significant past surgical history          Vitals:   T(F): 101.5 (08-06-24 @ 08:00), Max: 102 (08-05-24 @ 20:00)  HR: 144 (08-06-24 @ 08:00)  BP: 99/70 (08-06-24 @ 08:00)  RR: 21 (08-06-24 @ 08:00)  SpO2: 90% (08-06-24 @ 08:00)      Diet, NPO:   Except Medications      Fluids:     I & O's:    08-05-24 @ 07:01  -  08-06-24 @ 07:00  --------------------------------------------------------  IN:    Dexmedetomidine: 108.1 mL    Enteral Tube Flush: 220 mL    IV PiggyBack: 950 mL    Norepinephrine: 657.3 mL    Phenylephrine: 1052.2 mL    Vasopressin: 126 mL  Total IN: 3113.6 mL    OUT:    Chest Tube (mL): 75 mL    Incontinent per Collection Bag (mL): 650 mL  Total OUT: 725 mL    Total NET: 2388.6 mL      PHYSICAL EXAM:  General: NAD  Cardiac: RRR   Respiratory: equal chest raise bilaterally, normal respiratory effort, no accessory muscle use, on BIPAP, Chest tube on waterseal   Abdomen: Soft, non-distended  Skin: Warm/dry, normal color      MEDICATIONS  (STANDING):  chlorhexidine 2% Cloths 1 Application(s) Topical <User Schedule>  dexMEDEtomidine Infusion 0.3 MICROgram(s)/kG/Hr (3.53 mL/Hr) IV Continuous <Continuous>  gabapentin 300 milliGRAM(s) Oral two times a day  hydrocortisone sodium succinate Injectable 100 milliGRAM(s) IV Push every 8 hours  levETIRAcetam  IVPB 750 milliGRAM(s) IV Intermittent two times a day  linezolid  IVPB 600 milliGRAM(s) IV Intermittent every 12 hours  meropenem  IVPB 1000 milliGRAM(s) IV Intermittent every 8 hours  midodrine. 10 milliGRAM(s) Oral three times a day  norepinephrine Infusion 0.05 MICROgram(s)/kG/Min (2.2 mL/Hr) IV Continuous <Continuous>  pantoprazole  Injectable 40 milliGRAM(s) IV Push every 12 hours  phenylephrine    Infusion 5 MICROgram(s)/kG/Min (44 mL/Hr) IV Continuous <Continuous>  vasopressin Infusion 0.04 Unit(s)/Min (6 mL/Hr) IV Continuous <Continuous>    MEDICATIONS  (PRN):      DVT PROPHYLAXIS:   GI PROPHYLAXIS: pantoprazole  Injectable 40 milliGRAM(s) IV Push every 12 hours    ANTICOAGULATION:   ANTIBIOTICS:  linezolid  IVPB 600 milliGRAM(s)  meropenem  IVPB 1000 milliGRAM(s)            LAB/STUDIES:  Labs:  CAPILLARY BLOOD GLUCOSE      POCT Blood Glucose.: 80 mg/dL (06 Aug 2024 08:17)  POCT Blood Glucose.: 121 mg/dL (05 Aug 2024 15:21)                          10.1   25.86 )-----------( 64       ( 06 Aug 2024 06:08 )             34.0       Auto Neutrophil %: 90.2 % (08-06-24 @ 06:08)  Auto Immature Granulocyte %: 4.2 % (08-06-24 @ 06:08)    08-06    141  |  99  |  28<H>  ----------------------------<  52<LL>  3.8   |  19  |  0.7      Calcium: 8.1 mg/dL (08-06-24 @ 06:08)      LFTs:             5.0  | 1.2  | 227      ------------------[98      ( 06 Aug 2024 06:08 )  2.3  | x    | 319         Lipase:x      Amylase:x         Blood Gas Arterial, Lactate: 5.8 mmol/L (08-05-24 @ 15:32)  Blood Gas Arterial, Lactate: 7.0 mmol/L (08-04-24 @ 09:32)  Blood Gas Arterial, Lactate: 8.8 mmol/L (08-03-24 @ 12:09)  Lactate, Blood: 8.4 mmol/L (08-03-24 @ 11:35)  Blood Gas Arterial, Lactate: 8.4 mmol/L (08-03-24 @ 11:23)  Lactate, Blood: 7.9 mmol/L (08-03-24 @ 09:03)  Blood Gas Venous - Lactate: 7.4 mmol/L (08-03-24 @ 09:03)  Blood Gas Arterial, Lactate: 7.2 mmol/L (08-03-24 @ 08:53)    ABG - ( 05 Aug 2024 15:32 )  pH: 7.21  /  pCO2: 73    /  pO2: 78    / HCO3: 29    / Base Excess: -0.6  /  SaO2: 95.8            ABG - ( 04 Aug 2024 09:32 )  pH: 7.40  /  pCO2: 53    /  pO2: 74    / HCO3: 33    / Base Excess: 6.5   /  SaO2: 96.4            ABG - ( 03 Aug 2024 12:09 )  pH: 7.17  /  pCO2: 54    /  pO2: 55    / HCO3: 20    / Base Excess: -9.2  /  SaO2: 84.3              Coags:            Urinalysis Basic - ( 06 Aug 2024 06:08 )    Color: x / Appearance: x / SG: x / pH: x  Gluc: 52 mg/dL / Ketone: x  / Bili: x / Urobili: x   Blood: x / Protein: x / Nitrite: x   Leuk Esterase: x / RBC: x / WBC x   Sq Epi: x / Non Sq Epi: x / Bacteria: x        Urinalysis with Rflx Culture (collected 03 Aug 2024 08:42)    Culture - Urine (collected 03 Aug 2024 08:42)  Source: Catheterized None  Preliminary Report (05 Aug 2024 11:49):    >100,000 CFU/ml Gram Negative Rods              IMAGING:  < from: Xray Chest 1 View- PORTABLE-Routine (Xray Chest 1 View- PORTABLE-Routine in AM.) (08.06.24 @ 04:53) >  Findings:    Support devices: Stable positioning    Cardiac/mediastinum/hilum: No significant change    Skeleton/soft tissues: No significant change.    Lung parenchyma/Pleura: Stable bilateral opacities. No definite   pneumothorax.    Impression:  No significant change.    < end of copied text >        GREEN TEAM SPECTRA: [id number]

## 2024-08-06 NOTE — PROGRESS NOTE ADULT - ASSESSMENT
Impression    # Septic Shock  # Sinus tachycardia  # Acute hypoxemic respiratory failure  # Acute aspiration pneumonia  # Iatrogenic R PTX    Plan:      CNS: No sedation; AEDs; On BIPAP. MS is poor to begin with.     HEENT: Oral care.     CARDIOVASCULAR: On 3 pressors. Remains hypotensive. No improvement in status.     PULMONARY: BIPAP 100% Fio2. No improvement in respiratory status. CXR worsened.     GASTROINTESTINAL: NPO for now while on BIPAP.     GENITOURINARY/RENAL: Fluid balance grossly positive.    INFECTIOUS: Per ID; Meropenem and Linezolid.     HEMATOLOGIC: DVT ppx: Lovenox SQ    ENDOCRINE: Follow up FS.  Insulin protocol as needed.  BG goal 140-180    CODE STATUS: DNR/DNI; pall care eval; discussion for CMO for today; no escalation signed.   Very poor prognosis  LINES: R-IJ TLC, R chest tube to suction; A line; no cervantes

## 2024-08-06 NOTE — PROGRESS NOTE ADULT - ASSESSMENT
· Assessment	  59 yo F with pmhx of down syndrome, anemia, cerebral palsy, seizure disorder, osteoporosis, hypotension on midodrine, nonverbal at baseline who was BIBEMS from group home for  respiratory distress with complaints of fever and hypoxia that began earlier in the evening. Pt was recently admitted (June 26-July 10) for AHFH due to suspected recurrent aspiration pna, had severe sepsis on admission, was treated by infectious disease with zyvox and meropenum.     ER:  T104.2, HR: 160 , on Levo  Upon arrival to ED, pt was placed on HFNC and desatted to 50s, switched to BIPAP    IMPRESSION/RECOMMENDATIONS  Immunosuppression with Obesity could lead to a poor clinical outcome  Acute  illness  which poses a threat to life or bodily function without treatment   Acute hypoxic respiratory failure with ARDS. On Bipap  CXR with increasing opacities right>left with increasing leucytosis  Pt is a DNR/DNI  Septic shock ( severe sepsis ON LEVO  ) with persistent fevers  RML/LLL bacterial PNA secondary to aspiration  Pyelonephritis possible ( UA p )   wbc 25.8  8/3 R IJ catheter placed with  subsequent small right pneumothorax  8/3 R CT with resolution of pneumothorax  8/3 BCx NG  Nares ORSA NG  Urine for strep pneumonia/legionella antigen NG    -Sputum gm stain, cultures ( if can be obtained )  -Off loading to prevent pressure sores and preventive measures to avoid aspiration  -Meropenem 1 gm iv q8h  -Linezolid 600 mg iv q12h    Prognosis is very poor

## 2024-08-06 NOTE — DISCHARGE NOTE FOR THE EXPIRED PATIENT - HOSPITAL COURSE
60 yo F with pmhx of down syndrome, anemia, cerebral palsy, seizure disorder, osteoporosis, hypotension on midodrine, nonverbal at baseline who was BIBEMS from group home for respiratory distress with complaints of fever and hypoxia that began earlier in the evening. Pt was recently admitted (June 26-July 10) for AHFH due to suspected recurrent aspiration pna, had severe sepsis on admission, was treated by infectious disease with zyvox and meropenum. En route patient was placed on NRB at 15lpm with saturations in the 70's. Upon arrival to ED, pt was placed on HFNC and desatted to 50s, switched to BIPAP. Given high risk of recurrent aspiration, pt was suctioned and  switched over back to HFNC now satting 98%. Pt became hypotensive MAP 57, started on Phenylephrine in the ED.    Hospital course:  Pt was admitted for Sepsis POA and AHRF 2/2 Aspiration PNA. She was treated with pressors and antibiotics, with no improvement. On 8/5 no escalation of care was signed. Patient passed on 8/6/24 at 11:24 AM.

## 2024-08-06 NOTE — PROGRESS NOTE ADULT - CRITICAL CARE ATTENDING COMMENT
-The patient's injury acutely impairs one or more vital organ systems, and there is a high probability of imminent or life threatening deterioration in the patient's condition. The care of this patient requires complex medical decision making in the field of Infectious Diseases and extensive interpretation of laboratory, microbiological and radiographic data.
-The patient's injury acutely impairs one or more vital organ systems, and there is a high probability of imminent or life threatening deterioration in the patient's condition. The care of this patient requires complex medical decision making in the field of Infectious Diseases and extensive interpretation of laboratory, microbiological and radiographic data.

## 2024-08-06 NOTE — PROGRESS NOTE ADULT - ASSESSMENT
58F with PMH of Down syndrome, anemia, CP, seizures disorder, hypotension, nonverbal at baseline, many admissions for AHRF from aspiraiton pneumonia and sepsis, here with AHRF, likely aspiration, requiring NIV, in septic shock, on three pressors in ICU. Patient is DNR/DNI from prior admission, with MOLST checklist completed. Palliative called for GOC.    - see GOC above, patient on pressors, NIV, but after d/w patient's primary and with MHLS, decided that care can move towards CMO without completion of revised MOLST checklist  - d/w ICU fellow  - patient subsequently passed away  ______________  Lei Capone MD  Palliative Medicine  [facility]   of Geriatric [facility] Palliative [facility]  [phone]

## 2024-08-06 NOTE — PROGRESS NOTE ADULT - ASSESSMENT
ASSESSMENT:  59y F w/ PMHx of  *    PLAN:  - place chest tube to waterseal   - follow up family discussion   -    Lines/Tubes      GREEN TEAM SPECTRA: [id number]     ASSESSMENT:  59y F w/ PMHx of down syndrome, anemia, cerebral palsy, seizure, osteoporosis, hypotension (on midodrine) found to have right sided apical pneumothorax s/p central line placement. Pt has a right sided 14fr pigtail, currently on waterseal without air leaks.     PLAN:  - place chest tube to waterseal   - daily AM CXR  - follow up family discussion   - continue to monitor chest tube output, quality and quantity  - hemodynamic monitoring   - continue IV abx  - rest of care per primary team   [phone]          GREEN TEAM SPECTRA: [id number]

## 2024-08-06 NOTE — PROGRESS NOTE ADULT - SUBJECTIVE AND OBJECTIVE BOX
Patient is a 59y old  Female admitted for Sepsis POA and AHRF 2/2 Aspiration PNA     INTERVAL HPI/OVERNIGHT EVENTS:   Febrile .7, cooling blanket    Subjective:    ICU Vital Signs Last 24 Hrs  T(C): 38.5 (06 Aug 2024 09:00), Max: 38.9 (05 Aug 2024 20:00)  T(F): 101.3 (06 Aug 2024 09:00), Max: 102 (05 Aug 2024 20:00)  HR: 146 (06 Aug 2024 09:00) (109 - 160)  BP: 104/53 (06 Aug 2024 09:00) (75/49 - 132/58)  BP(mean): 74 (06 Aug 2024 09:00) (56 - 89)  ABP: 84/75 (05 Aug 2024 13:55) (67/55 - 87/80)  ABP(mean): 80 (05 Aug 2024 13:55) (60 - 84)  RR: 41 (06 Aug 2024 09:) (12 - 60)  SpO2: 100% (06 Aug 2024 09:00) (57% - 100%)    O2 Parameters below as of 06 Aug 2024 09:00  Patient On (Oxygen Delivery Method): BiPAP/CPAP          I&O's Summary    05 Aug 2024 07:01  -  06 Aug 2024 07:00  --------------------------------------------------------  IN: 3113.6 mL / OUT: 725 mL / NET: 2388.6 mL    06 Aug 2024 07:01  -  06 Aug 2024 10:06  --------------------------------------------------------  IN: 0 mL / OUT: 200 mL / NET: -200 mL          Daily     Daily Weight in k.2 (06 Aug 2024 02:00)    MEDICATIONS  (STANDING):  chlorhexidine 2% Cloths 1 Application(s) Topical <User Schedule>  dexMEDEtomidine Infusion 0.3 MICROgram(s)/kG/Hr (3.53 mL/Hr) IV Continuous <Continuous>  gabapentin 300 milliGRAM(s) Oral two times a day  hydrocortisone sodium succinate Injectable 100 milliGRAM(s) IV Push every 8 hours  levETIRAcetam  IVPB 750 milliGRAM(s) IV Intermittent two times a day  linezolid  IVPB 600 milliGRAM(s) IV Intermittent every 12 hours  meropenem  IVPB 1000 milliGRAM(s) IV Intermittent every 8 hours  midodrine. 10 milliGRAM(s) Oral three times a day  norepinephrine Infusion 0.05 MICROgram(s)/kG/Min (2.2 mL/Hr) IV Continuous <Continuous>  pantoprazole  Injectable 40 milliGRAM(s) IV Push every 12 hours  phenylephrine    Infusion 5 MICROgram(s)/kG/Min (44 mL/Hr) IV Continuous <Continuous>  vasopressin Infusion 0.04 Unit(s)/Min (6 mL/Hr) IV Continuous <Continuous>    MEDICATIONS  (PRN):      PHYSICAL EXAM:  GENERAL: distressed  HEENT: NCAT  CHEST/LUNG: B/L air entry  HEART: Regular rate and rhythm  ABDOMEN: Soft, Nondistended, + BS  EXTREMITIES:  warm, no movement  NEURO: no MS    LABS:                        10.1   25.86 )-----------( 64       ( 06 Aug 2024 06:08 )             34.0     08-06    141  |  99  |  28<H>  ----------------------------<  52<LL>  3.8   |  19  |  0.7    Ca    8.1<L>      06 Aug 2024 06:08  Mg     2.0     08-06    TPro  5.0<L>  /  Alb  2.3<L>  /  TBili  1.2  /  DBili  x   /  AST  227<H>  /  ALT  319<H>  /  AlkPhos  98  08-06    LIVER FUNCTIONS - ( 06 Aug 2024 06:08 )  Alb: 2.3 g/dL / Pro: 5.0 g/dL / ALK PHOS: 98 U/L / ALT: 319 U/L / AST: 227 U/L / GGT: x             CAPILLARY BLOOD GLUCOSE      POCT Blood Glucose.: 80 mg/dL (06 Aug 2024 08:17)  POCT Blood Glucose.: 121 mg/dL (05 Aug 2024 15:21)    ABG - ( 05 Aug 2024 15:32 )  pH, Arterial: 7.21  pH, Blood: x     /  pCO2: 73    /  pO2: 78    / HCO3: 29    / Base Excess: -0.6  /  SaO2: 95.8                    Urinalysis Basic - ( 06 Aug 2024 06:08 )    Color: x / Appearance: x / SG: x / pH: x  Gluc: 52 mg/dL / Ketone: x  / Bili: x / Urobili: x   Blood: x / Protein: x / Nitrite: x   Leuk Esterase: x / RBC: x / WBC x   Sq Epi: x / Non Sq Epi: x / Bacteria: x          RADIOLOGY & ADDITIONAL TESTS:  CXR:        Care Discussed with Consultants/Other Providers [ x] YES  [ ] NO

## 2024-08-06 NOTE — PROGRESS NOTE ADULT - PROVIDER SPECIALTY LIST ADULT
Critical Care
Critical Care
Infectious Disease
Thoracic Surgery
CCU
Thoracic Surgery
Critical Care
Gastroenterology
Thoracic Surgery
Infectious Disease
Infectious Disease
Palliative Care

## 2024-08-13 NOTE — ED ADULT TRIAGE NOTE - BEFAST SCREENING
Negative
I have reviewed and confirmed nurses' notes for patient's medications, allergies, medical history, and surgical history.

## 2024-08-20 DIAGNOSIS — G40.909 EPILEPSY, UNSPECIFIED, NOT INTRACTABLE, WITHOUT STATUS EPILEPTICUS: ICD-10-CM

## 2024-08-20 DIAGNOSIS — D64.9 ANEMIA, UNSPECIFIED: ICD-10-CM
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
## 2024-08-20 DIAGNOSIS — E87.4 MIXED DISORDER OF ACID-BASE BALANCE: ICD-10-CM

## 2024-08-20 DIAGNOSIS — J96.01 ACUTE RESPIRATORY FAILURE WITH HYPOXIA: ICD-10-CM

## 2024-08-20 DIAGNOSIS — Z66 DO NOT RESUSCITATE: ICD-10-CM

## 2024-08-20 DIAGNOSIS — A41.9 SEPSIS, UNSPECIFIED ORGANISM: ICD-10-CM

## 2024-08-20 DIAGNOSIS — J69.0 PNEUMONITIS DUE TO INHALATION OF FOOD AND VOMIT: ICD-10-CM
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
## 2024-08-20 DIAGNOSIS — M81.0 AGE-RELATED OSTEOPOROSIS WITHOUT CURRENT PATHOLOGICAL FRACTURE: ICD-10-CM

## 2024-08-20 DIAGNOSIS — N12 TUBULO-INTERSTITIAL NEPHRITIS, NOT SPECIFIED AS ACUTE OR CHRONIC: ICD-10-CM

## 2024-08-20 DIAGNOSIS — J95.811 POSTPROCEDURAL PNEUMOTHORAX: ICD-10-CM

## 2024-08-20 DIAGNOSIS — J15.9 UNSPECIFIED BACTERIAL PNEUMONIA: ICD-10-CM

## 2024-08-20 DIAGNOSIS — Q90.9 DOWN SYNDROME, UNSPECIFIED: ICD-10-CM

## 2024-08-20 DIAGNOSIS — R65.21 SEVERE SEPSIS WITH SEPTIC SHOCK: ICD-10-CM

## 2024-08-20 DIAGNOSIS — G80.9 CEREBRAL PALSY, UNSPECIFIED: ICD-10-CM

## 2024-08-22 NOTE — PROCEDURE NOTE - NSSITEPREP_SKIN_A_CORE
HISTORY OF PRESENT ILLNESS:    This is a 53 year old female here today for a mass on her right arm over the back of the elbow.  Patient noted a bump on her right arm on 8/6/2024, no known injury or trauma to the arm.  At first it looked like there was a pimple on the side, had minor purulent discharge.  Pain present only when elbow is struck, rates 1/10, relates tenderness on the lateral side of the right arm with some swelling.  Denies fever, chills, any recent discharge    Patient notes difficulty losing weight, was prescribed metformin but was unable to tolerate the GI side effects, also taking Wellbutrin 75 mg twice daily for fatigue that can also help with weight loss.  Has continue to work on a healthy diet, increasing fruits and vegetables and decreasing sugar intake.  Was prescribed Wegovy but insurance will not cover any weight loss medication at this time. Denies suicidal/homicidal ideation and thoughts of self harm.       I have reviewed the patient's medications and allergies, past medical, surgical, social and family history, updating these as appropriate.  See Histories section of the EMR (electronic medical record) for a display of this information.    REVIEW OF SYSTEMS: As modified in the HPI (history of present illness), otherwise: negative         PHYSICAL EXAM:    Vital Signs: Visit Vitals  /86 (BP Location: RUE - Right upper extremity, Patient Position: Sitting)   Pulse 92   Temp 97.7 °F (36.5 °C) (Oral)   Resp 16   Wt 94.7 kg (208 lb 11.2 oz)   LMP 11/27/2018   SpO2 96%   BMI 34.73 kg/m²        Physical Exam  Constitutional:       Appearance: Normal appearance.   HENT:      Head: Normocephalic.   Cardiovascular:      Rate and Rhythm: Normal rate and regular rhythm.      Pulses: Normal pulses.   Pulmonary:      Effort: Pulmonary effort is normal.   Musculoskeletal:      Right elbow: Deformity (cyst present over elbow) present.   Skin:     General: Skin is warm.      Findings: Erythema (cyst  on elbow) present.   Neurological:      Mental Status: She is alert.               ASSESSMENT AND PLAN:    Diagnoses and all orders for this visit:    Olecranon bursitis of right elbow  Mass on right elbow, most likely olecranon bursitis versus a cyst.  Slight temperature variation over the mass, erythema present.  Will treat with antibiotics, if no improvement, consider referral to orthopedic surgery for drainage.  -     sulfamethoxazole-trimethoprim (Bactrim DS) 800-160 MG per tablet; Take 1 tablet by mouth in the morning and 1 tablet in the evening. Do all this for 10 days.    Generalized anxiety disorder  -     buPROPion XL (WELLBUTRIN XL) 300 MG 24 hr tablet; Take 1 tablet by mouth daily.    Obesity (BMI 30.0-34.9)  Frustrated with struggles of losing weight, has tried metformin in the past unable to tolerate due to GI side effects.  Discussed options/side effects of medications, topiramate, naltrexone and increasing bupropion dosage.  Will increase bupropion dosage at this time, may consider addition of naltrexone in the future-     buPROPion XL (WELLBUTRIN XL) 300 MG 24 hr tablet; Take 1 tablet by mouth daily.       Patient's questions answered. Patient states understanding and agrees with plan of care. Denies further questions.     Return if symptoms worsen or fail to improve.    Patient Care Team:  Jenny Esquivel MD as PCP - General (Family Practice)    ADAM Vasquez       chlorhexidine

## 2024-09-06 NOTE — ED PROVIDER NOTE - NS ED MD DISPO SPECIAL CONSIDERATION1
Please notify patient that vesicare was sent to pharmacy for medication trial. He is to take one tablet daily. The medication is similar to oxybutynin but should be within his formulary based on epic.    None

## 2025-01-29 NOTE — DISCHARGE NOTE NURSING/CASE MANAGEMENT/SOCIAL WORK - NSDCVIVACCINE_GEN_ALL_CORE_FT
Addended by: EVETTE SORENSON on: 1/29/2025 11:10 AM     Modules accepted: Orders    
No Vaccines Administered.

## 2025-02-01 NOTE — DISCHARGE NOTE NURSING/CASE MANAGEMENT/SOCIAL WORK - NSDPDISTO_GEN_ALL_CORE
Patient is A+Ox4, respirations are even and unlabored, no use of any accessory muscles. Patient was able to ambulate to the bathroom on his own and had an SpO2 of 100% when returning back to the stretcher. Cardiac rate and rhythm is irregular and 2+. A-Fib noted on the monitor. Patient offering no further medical complaints at this time. Safety and comfort maintained. Awaiting further MD orders. Plan of care ongoing. Skilled Nursing Facility

## 2025-02-27 NOTE — ED PROVIDER NOTE - SECONDARY DIAGNOSIS.
Patient called and writer spoke with her. She said she has a flare up on Left side of her inguinal fold that is red and swollen. She denies drainage. She is uncomfortable to walk at all, and it wakes her up at night because it throbs at night. She had this flare up and is not sure why, saying she has been good had been so good for quite awhile now. She is requesting cephalexin since she was given that before for a flare up. It was last prescribed on 11/26/24. Sent a message to Dr. Muchard to advise. She would like it sent to this [facility]: [facility]     Sent Dr. Muchard a message.   
Spoke with Dr. Muchard and she advised the patient could have a round of Keflex following the previous dosing. Sent in dosing following last script from 11/26/24 a cephalexin 500 mg caps, 20 caps, Take 1 capsule by mouth in the morning and 1 capsule in the evening.     Called patient and let her know. She was appreciative.   
Right foot ulcer

## 2025-04-23 NOTE — PROGRESS NOTE ADULT - ASSESSMENT
ASSESSMENT   58 year old female with a PMHx of Down syndrome, nonverbal at baseline, hypothyroidism, cerebral palsy, congenital pulmonary stenosis, Seizures, presents to the ED from nursing facility for syncope associated with tonic-clonic movement witnessed by aide. Initial vitals at nursing home showed bradycardia and hypoxia for about 10-15 minutes. N    IMPRESSION  #Seizure like activity   #Right planter foot ulcers - two full thickness ulcers - serous drainage with mild erythema with OM  - MR Foot No Cont, Right (10.16.23 @ 21:51): IMPRESSION: 1.  Limited exam. 2.  Osteomyelitis of the first metatarsal stump. 3.  Osteomyelitis of the second toe distal phalanx.    #Rapid Response 10/17   #HAP   - Xray Chest 1 View- PORTABLE-Urgent (Xray Chest 1 View- PORTABLE-Urgent .) (10.17.23 @ 06:10): Worsening pneumonia, left lower lung.  - trach Cx 10/17 NG   - Procalcitonin, Serum: 4.36(10.17.23 @ 17:20)      #Elevated Fungitell   Fungitell: >500 pg/mL (10.17.23 @ 17:20)    #Buttock decubitus ulcer     #Recently Treated Multifocal PNA    #Down syndrome/Crebral Palsy   #Obesity BMI (kg/m2): 23.7, 26.3  #Abx allergy: No Known Allergies    RECOMMENDATIONS  - planned for OR eventually   - if OR date is not possible in near future, deep wound cx would be helpful in guiding antibiotic choice  - for now, would monitor off antibiotics   - wean O2 as tolerated    Please call or message on [employer] Teams if with any questions.  Spectra [phone]     Chest pain

## 2025-05-28 NOTE — INITIAL ORGAN DONATION REFERRAL - NSORGANDONATIONCLINICTRIGGER1_GEN_ALL_CORE
Refill Protocol Criteria Failed Due to:     Hgb A1c less than 8 within last 6 months looking at last value -- IF CRITERIA FAILED REFER TO PROTOCOL DETAILS        Last A1c done 5/15/25  8.1   
Mechanically Vented